# Patient Record
Sex: MALE | Race: WHITE | NOT HISPANIC OR LATINO | Employment: OTHER | URBAN - METROPOLITAN AREA
[De-identification: names, ages, dates, MRNs, and addresses within clinical notes are randomized per-mention and may not be internally consistent; named-entity substitution may affect disease eponyms.]

---

## 2017-01-04 ENCOUNTER — HOSPITAL ENCOUNTER (OUTPATIENT)
Dept: PHYSICAL THERAPY | Facility: CLINIC | Age: 52
Setting detail: THERAPIES SERIES
End: 2017-01-04
Attending: INTERNAL MEDICINE
Payer: COMMERCIAL

## 2017-01-04 DIAGNOSIS — R60.0 EDEMA OF BOTH LEGS: Primary | ICD-10-CM

## 2017-01-04 PROCEDURE — 97535 SELF CARE MNGMENT TRAINING: CPT | Mod: GP,ZF | Performed by: PHYSICAL THERAPIST

## 2017-01-04 PROCEDURE — 97161 PT EVAL LOW COMPLEX 20 MIN: CPT | Mod: GP,ZF | Performed by: PHYSICAL THERAPIST

## 2017-01-04 PROCEDURE — 97140 MANUAL THERAPY 1/> REGIONS: CPT | Mod: GP,ZF | Performed by: PHYSICAL THERAPIST

## 2017-01-04 PROCEDURE — 40000449 ZZHC STATISTIC PT VISIT, LYMPHEDEMA: Mod: ZF | Performed by: PHYSICAL THERAPIST

## 2017-01-04 PROCEDURE — 97110 THERAPEUTIC EXERCISES: CPT | Mod: GP,ZF | Performed by: PHYSICAL THERAPIST

## 2017-01-04 NOTE — PROGRESS NOTES
01/04/17 1500   Quick Adds   Quick Adds Certification   Rehab Discipline   Discipline PT   Type of Visit   Type of visit Initial Edema Evaluation   General Information   Start of care 01/04/17   Referring physician Dr. Juan Simms   Orders Evaluate and treat as indicated   Order date 11/30/16   Medical diagnosis S/p Liver Transplant, edema of both legs   Onset of illness / date of surgery 09/20/17  (Liver Transplant)   Edema onset 04/01/16   Affected body parts Trunk;LLE;RLE   Edema etiology Surgery  (Medical Edema from Transplant)   Edema etiology comments Pt completed course of edema therapy prior to transplant. Since transplant, patient did have some increased fluid that improved with Lasix but pt would like to have evaluation by Edema therapy.  Pt reports that swelling on flights of over than 4 hours.   Surgical / medical history reviewed Yes   Prior level of functional mobility Pt reports feeling that mobility can still be difficult   Prior treatment Complete decongestive therapy   Community support Family / friend caregiver   Patient role / employment history Disabled   Psychosocial concerns Impaired body image   Living environment Apartment / condo   Current assistive devices (None)   Fall Screening   Fall screen completed by PT   Per patient, fall 2 or more times in past year? No   Per patient, fall with injury in past year? No   Is patient a fall risk? No   System Outcome Measures   Lymphedema Life Impact Scale (score range 0-72). A higher score indicates greater impairment. 24   Functional Scales   Lower Extremity Functional Scale (score out of 80). A lower score indicates greater impairment). 33   Subjective Report   Patient report of symptoms My legs are sensitive and heavy. I still feel weak.   Patient / Family Goals   Patient / family goals statement I would just like to know what to do now to manage the swelling.   Pain   Patient currently in pain No   Cognitive Status   Orientation Orientation to  "person, place and time   Level of consciousness Alert   Follows commands and answers questions 100% of the time   Personal safety and judgement Intact   Memory Intact   Edema Exam / Assessment   Skin condition Pitting;Dryness   Skin condition comments Skin intact, no scratching marks, mild petichaie   Pitting 3+   Pitting location B pre-tibial  (No foot pitting)   Stemmer sign Negative   Girth Measurements   Girth Measurements Refer to separate girth measurement flowsheet   Volume LE   Right LE (mL) 7572   Left LE (mL) 7708   LE volume comparison LLE volume greater than RLE volume   % difference 2   Range of Motion   ROM comments Decreased knee and hip flexion   Strength   Strength Other   Strength comments \"I feel weak.\" Not formally tested. Pt is able to complete SLR and walk independently, he reports finishing with home PT about 2 weeks ago.   Posture   Posture Normal   Activities of Daily Living   Activities of Daily Living Independent   Bed Mobility   Bed mobility Independent   Transfers   Transfers Independent   Gait / Locomotion   Gait / Locomotion Independent  (I still get winded)   Sensory   Sensory perception comments Intact   Coordination   Coordination Gross motor coordination appropriate   Muscle Tone   Muscle tone No deficits were identified   Planned Edema Interventions   Planned edema interventions Manual lymph drainage;Fit for compression garment;Gradient compression bandaging;Exercises;Precautions to prevent infection / exacerbation;Education;Home management program development   Clinical Impression   Criteria for skilled therapeutic intervention met Yes   Therapy diagnosis Lymphedema   Influenced by the following impairments / conditions Stage 2   Functional limitations due to impairments / conditions stairs, squating, walking tolerance,fit of clothing, ability to perform leisure activity, sleep   Clinical Presentation Stable/Uncomplicated   Clinical Presentation Rationale Pt is post-transplant, " doing well but needs more time to recover   Clinical Decision Making (Complexity) Low complexity   Treatment frequency Other;1 time / week  (4 weeks)   Treatment duration 60 days   Patient / family and/or staff in agreement with plan of care Yes   Risks and benefits of therapy have been explained Yes   Clinical impression comments Pt reports to clinic post-liver transplant to review edema management strategies and progress home program   Education Assessment   Preferred learning style Listening;Reading;Demonstration;Pictures / video   Barriers to learning Physical   Goals   Edema Eval Goals 1;2   Goal 1   Goal identifier Home Program   Goal description Pt will be independent with his home program to manage LE swelling.   Target date 03/04/17   Goal 2   Goal identifier Volume   Goal description Pt will have decreased volume of 5% in each LE for improved mobility and fit of shoes.   Target date 03/04/17   Goal 3   Goal identifier LLIS   Goal description Pt will score decrease of 7 or more points reflecting decreased impact of LE swelling on quality of life.   Target date 03/04/17   Total Evaluation Time   Total evaluation time 15   Certification   Certification date from 01/04/17   Certification date to 03/04/17   Medical Diagnosis Lymphedema

## 2017-01-05 NOTE — PROGRESS NOTES
[facility]        OUTPATIENT PHYSICAL THERAPY EDEMA EVALUATION  PLAN OF TREATMENT FOR OUTPATIENT REHABILITATION  (COMPLETE FOR INITIAL CLAIMS ONLY)  Patient's Last Name, First Name, DANISLEO  DylonBlanco CENTENO                           Provider s Name:   [facility] Medical Record No.  [id number]     Start of Care Date:  01/04/17   Onset Date:  04/01/16   Type:  PT   Medical Diagnosis:  Lymphedema   Therapy Diagnosis:  Lymphedema Visits from SOC:  1                                     __________________________________________________________________________________   Plan of Treatment/Functional Goals:    Manual lymph drainage, Fit for compression garment, Gradient compression bandaging, Exercises, Precautions to prevent infection / exacerbation, Education, Home management program development        GOALS  1. Goal description: Pt will be independent with his home program to manage LE swelling.       Target date: 03/04/17  2. Goal description: Pt will have decreased volume of 5% in each LE for improved mobility and fit of shoes.       Target date: 03/04/17  3. Goal description: Pt will score decrease of 7 or more points reflecting decreased impact of LE swelling on quality of life.       Target date: 03/04/17  4.            5.            6.               7.             8.              Treatment frequency: Other, 1 time / week (4 weeks)   Treatment duration: 60 days    Aracely Macias, PT                                  I CERTIFY THE NEED FOR THESE SERVICES FURNISHED UNDER THIS PLAN OF TREATMENT AND WHILE UNDER MY CARE     (Physician signature in associated progress note indicates review and certification of the therapy plan)                  Certification date from: 01/04/17       Certification date to: 03/04/17           Referring physician: Dr. Juan Simms    Initial Assessment  See Epic Evaluation- Start of care: 01/04/17

## 2017-01-13 ENCOUNTER — TELEPHONE (OUTPATIENT)
Dept: TRANSPLANT | Facility: CLINIC | Age: 52
End: 2017-01-13

## 2017-01-13 ENCOUNTER — TELEPHONE (OUTPATIENT)
Dept: PHARMACY | Facility: CLINIC | Age: 52
End: 2017-01-13

## 2017-01-13 NOTE — TELEPHONE ENCOUNTER
Jerrica Almaraz, RN Signed Jerrica Almaraz, RN 12/14/2016  9:56 AM       Telephone Encounter      Expand All Collapse All    Spoke to Blanco.  Is feeling well, just returned from [place].  WBC low, due per protocol to wean off next week.  For now asked him to decrease cellcept to 500 mg po bid.

## 2017-01-13 NOTE — TELEPHONE ENCOUNTER
----- Message from Blanco Garza [facility] sent at 1/13/2017 10:34 AM CST -----  Oumou Simeon said is mycophenolate is now just 1 capsule a day.  Can you verify and send us a new RX?  Thanks  Ramakrishna

## 2017-01-17 ENCOUNTER — TELEPHONE (OUTPATIENT)
Dept: TRANSPLANT | Facility: CLINIC | Age: 52
End: 2017-01-17

## 2017-01-17 DIAGNOSIS — D84.9 IMMUNOSUPPRESSED STATUS (H): ICD-10-CM

## 2017-01-17 DIAGNOSIS — Z94.4 LIVER TRANSPLANTED (H): ICD-10-CM

## 2017-01-17 DIAGNOSIS — D70.9 NEUTROPENIA (H): Primary | ICD-10-CM

## 2017-01-17 NOTE — TELEPHONE ENCOUNTER
Received labs today showing wbc 1.8 on 1/10.  Pt was CMV neg, donor CMV pos.  On lower dose cellcept and valcyte.  Told Blanco that I'm hesitant to change meds based on labs that are a week old.  Blanco will come here tomorrow for all post labs and CMV DNA PCR.  Orders in system.

## 2017-01-19 ENCOUNTER — DOCUMENTATION ONLY (OUTPATIENT)
Dept: TRANSPLANT | Facility: CLINIC | Age: 52
End: 2017-01-19

## 2017-01-19 NOTE — PROGRESS NOTES
Per post liver transplant protocol, this patient should be having ETG level every 3 mos.  Please send order to his lab and also place in the [employer] system.

## 2017-01-23 ENCOUNTER — OFFICE VISIT (OUTPATIENT)
Dept: GASTROENTEROLOGY | Facility: CLINIC | Age: 52
End: 2017-01-23
Attending: INTERNAL MEDICINE
Payer: COMMERCIAL

## 2017-01-23 ENCOUNTER — HOSPITAL ENCOUNTER (OUTPATIENT)
Dept: PHYSICAL THERAPY | Facility: CLINIC | Age: 52
Setting detail: THERAPIES SERIES
End: 2017-01-23
Attending: INTERNAL MEDICINE
Payer: COMMERCIAL

## 2017-01-23 VITALS
HEART RATE: 56 BPM | WEIGHT: 258.7 LBS | HEIGHT: 72 IN | SYSTOLIC BLOOD PRESSURE: 152 MMHG | BODY MASS INDEX: 35.04 KG/M2 | DIASTOLIC BLOOD PRESSURE: 96 MMHG | TEMPERATURE: 98.8 F

## 2017-01-23 DIAGNOSIS — I10 BENIGN ESSENTIAL HYPERTENSION: Primary | ICD-10-CM

## 2017-01-23 DIAGNOSIS — Z94.4 LIVER REPLACED BY TRANSPLANT (H): Primary | ICD-10-CM

## 2017-01-23 PROCEDURE — 97140 MANUAL THERAPY 1/> REGIONS: CPT | Mod: GP,ZF | Performed by: PHYSICAL THERAPIST

## 2017-01-23 PROCEDURE — 40000449 ZZHC STATISTIC PT VISIT, LYMPHEDEMA: Mod: ZF | Performed by: PHYSICAL THERAPIST

## 2017-01-23 PROCEDURE — 99212 OFFICE O/P EST SF 10 MIN: CPT | Mod: ZF

## 2017-01-23 PROCEDURE — 97535 SELF CARE MNGMENT TRAINING: CPT | Mod: GP,ZF | Performed by: PHYSICAL THERAPIST

## 2017-01-23 RX ORDER — AMLODIPINE BESYLATE 5 MG/1
5 TABLET ORAL DAILY
Qty: 90 TABLET | Refills: 3 | Status: SHIPPED | OUTPATIENT
Start: 2017-01-23 | End: 2018-01-23

## 2017-01-23 ASSESSMENT — PAIN SCALES - GENERAL: PAINLEVEL: MILD PAIN (3)

## 2017-01-23 NOTE — NURSING NOTE
Chief Complaint   Patient presents with     RECHECK     3 month follow up       Initial /96 mmHg  Pulse 56  Temp(Src) 98.8  F (37.1  C) (Oral)  Ht 1.829 m (6')  Wt 117.346 kg (258 lb 11.2 oz)  BMI 35.08 kg/m2 Estimated body mass index is 35.08 kg/(m^2) as calculated from the following:    Height as of this encounter: 1.829 m (6').    Weight as of this encounter: 117.346 kg (258 lb 11.2 oz).  BP completed using cuff size: regular

## 2017-01-23 NOTE — PROGRESS NOTES
I had the pleasure of seeing Blanco Osborne for followup in the Liver Transplantation Clinic at the [facility] on 01/23/2017.  Mr. Osborne returns for followup now 4 months status post liver transplantation.        He is doing well at this visit.  He does still have some abdominal discomfort that is josé luis-incisional.  He denies any itching or skin rash.  He still has some fatigue.  He denies any increased abdominal girth or lower extremity edema.  He denies any fevers or chills, cough or shortness of breath.  He denies any nausea or vomiting, diarrhea or constipation.  His appetite has been good, and he is trying to keep his weight under control given that he has nonalcoholic fatty liver disease as the likely cause of his cirrhosis.  He is in the process of weaning off CellCept.      He does report that his blood pressure has been running consistently elevated at home.     Current Outpatient Prescriptions   Medication     amLODIPine (NORVASC) 5 MG tablet     PROGRAF 1 MG PO CAPSULE     aspirin 81 MG tablet     mycophenolate (CELLCEPT - GENERIC EQUIVALENT) 250 MG capsule     valGANciclovir (VALCYTE) 450 MG tablet     sulfamethoxazole-trimethoprim (BACTRIM,SEPTRA) 400-80 MG per tablet     order for DME     multivitamin, therapeutic with minerals (THERA-VIT-M) TABS     No current facility-administered medications for this visit.     B/P: 152/96, T: 98.8, P: 56, R: Data Unavailable    HEENT exam shows no scleral icterus and no temporal muscle wasting.  Chest is clear.  Abdominal exam shows no increase in girth.  No masses or tenderness to palpation are present.  His liver is 10 cm in span without left lobe enlargement.  No spleen tip is palpable.  His incision appears intact.  His extremity exam shows no edema.  Skin exam shows some easy bruisability, but otherwise it is within normal limits.     His most recent laboratory tests show his white count is 1.8, hemoglobin is 11.2.  Platelets are  102,000.  Sodium 141, potassium 4.5, BUN is 22, creatinine 1.2, AST is 38, ALT is 31, alkaline phosphatase is 111, albumin is 3.6 with a total protein of 6.1, total bilirubin is 0.7.        My impression is that Mr. Osborne is doing very well status post liver transplantation.  He is a bit tremulous and does complain of some neuropathic type pain, and if he is still having this over the next 3 months, we may consider taking him off tacrolimus and switching him to cyclosporine.  In the meantime, he will discontinue his CellCept in 1 week.  He will get blood work done a week after that.  He is otherwise up-to-date with regard to vaccines.        Because of his persistently elevated blood pressure, I will put him on Norvasc 5 mg per day and treat his hypertension.  Otherwise, the plan will be to see him back in the clinic in 3 months.      Thank you very much for allowing me to participate in the care this patient.  If you have any questions regarding my recommendations, please do not hesitate to contact me.       Juan Simms MD    Professor of Medicine  [employer]    Executive Medical Director, Solid Organ Transplant Program  [facility]

## 2017-01-23 NOTE — NURSING NOTE
Here for post liver transplant follow-up.   Reviewed recent labs and assisted with interpretation.  Is recording labs in post transplant lab book.  WBC low - will send order to local lab asking for CMV DNA PCR.    Current immunosuppression:    cellcept / prograf.  Weaning cellcept off.      Complaints:  Tremor.    Med changes: none    Lab frequency:  q 2 weeks due to cellcept wean    Follow-up:  Hepatology, derm and PCP annually.

## 2017-01-23 NOTE — Clinical Note
1/23/2017       RE: Blanco Osborne  [address] [place] [address] [place], [place] [id number]     Dear Colleague,    Thank you for referring your patient, Blanco Osborne, to the [facility] at [facility]. Please see a copy of my visit note below.    I had the pleasure of seeing Blanco Osborne for followup in the Liver Transplantation Clinic at the [facility] on 01/23/2017.  Mr. Osborne returns for followup now 4 months status post liver transplantation.        He is doing well at this visit.  He does still have some abdominal discomfort that is josé luis-incisional.  He denies any itching or skin rash.  He still has some fatigue.  He denies any increased abdominal girth or lower extremity edema.  He denies any fevers or chills, cough or shortness of breath.  He denies any nausea or vomiting, diarrhea or constipation.  His appetite has been good, and he is trying to keep his weight under control given that he has nonalcoholic fatty liver disease as the likely cause of his cirrhosis.  He is in the process of weaning off CellCept.      He does report that his blood pressure has been running consistently elevated at home.     Current Outpatient Prescriptions   Medication     amLODIPine (NORVASC) 5 MG tablet     PROGRAF 1 MG PO CAPSULE     aspirin 81 MG tablet     mycophenolate (CELLCEPT - GENERIC EQUIVALENT) 250 MG capsule     valGANciclovir (VALCYTE) 450 MG tablet     sulfamethoxazole-trimethoprim (BACTRIM,SEPTRA) 400-80 MG per tablet     order for DME     multivitamin, therapeutic with minerals (THERA-VIT-M) TABS     No current facility-administered medications for this visit.     B/P: 152/96, T: 98.8, P: 56, R: Data Unavailable    HEENT exam shows no scleral icterus and no temporal muscle wasting.  Chest is clear.  Abdominal exam shows no increase in girth.  No masses or tenderness to palpation are present.  His liver is 10 cm in span without left lobe  enlargement.  No spleen tip is palpable.  His incision appears intact.  His extremity exam shows no edema.  Skin exam shows some easy bruisability, but otherwise it is within normal limits.     His most recent laboratory tests show his white count is 1.8, hemoglobin is 11.2.  Platelets are 102,000.  Sodium 141, potassium 4.5, BUN is 22, creatinine 1.2, AST is 38, ALT is 31, alkaline phosphatase is 111, albumin is 3.6 with a total protein of 6.1, total bilirubin is 0.7.        My impression is that Mr. Osborne is doing very well status post liver transplantation.  He is a bit tremulous and does complain of some neuropathic type pain, and if he is still having this over the next 3 months, we may consider taking him off tacrolimus and switching him to cyclosporine.  In the meantime, he will discontinue his CellCept in 1 week.  He will get blood work done a week after that.  He is otherwise up-to-date with regard to vaccines.        Because of his persistently elevated blood pressure, I will put him on Norvasc 5 mg per day and treat his hypertension.  Otherwise, the plan will be to see him back in the clinic in 3 months.      Thank you very much for allowing me to participate in the care this patient.  If you have any questions regarding my recommendations, please do not hesitate to contact me.       Juan Simms MD    Professor of Medicine  [employer]    Executive Medical Director, Solid Organ Transplant Program  [facility]

## 2017-01-24 NOTE — NURSING NOTE
Here for post liver transplant follow-up.     Current immunosuppression:    Weaning cellcept.  Will stay on prograf, but has a tremor.  Will return in 3 mos at which time we will re-evalauate immunosuppression.    Complaints:  Tremor.  Not feeling as strong as he'd like though he is doing more than most patients at this time.  Encouraged him to be patient w/ progress, work on strengthening    Med changes: Dr. Simms started norvasc 5 mg qd..  Updated patient's med card.    Lab frequency:  q 2 wks for 1 mo post cellcept wean.    Follow-up:  Hepatology, derm and PCP annually.

## 2017-01-27 ENCOUNTER — HOSPITAL ENCOUNTER (OUTPATIENT)
Dept: PHYSICAL THERAPY | Facility: CLINIC | Age: 52
Setting detail: THERAPIES SERIES
End: 2017-01-27
Attending: INTERNAL MEDICINE
Payer: COMMERCIAL

## 2017-01-27 DIAGNOSIS — I89.0 LYMPHEDEMA OF BOTH LOWER EXTREMITIES: Primary | ICD-10-CM

## 2017-01-27 PROCEDURE — 40000449 ZZHC STATISTIC PT VISIT, LYMPHEDEMA: Mod: ZF | Performed by: PHYSICAL THERAPIST

## 2017-01-27 PROCEDURE — 97140 MANUAL THERAPY 1/> REGIONS: CPT | Mod: GP,ZF | Performed by: PHYSICAL THERAPIST

## 2017-01-27 PROCEDURE — 97535 SELF CARE MNGMENT TRAINING: CPT | Mod: GP,ZF | Performed by: PHYSICAL THERAPIST

## 2017-02-01 ENCOUNTER — HOSPITAL ENCOUNTER (OUTPATIENT)
Dept: PHYSICAL THERAPY | Facility: CLINIC | Age: 52
Setting detail: THERAPIES SERIES
End: 2017-02-01
Attending: INTERNAL MEDICINE
Payer: COMMERCIAL

## 2017-02-01 PROCEDURE — 40000449 ZZHC STATISTIC PT VISIT, LYMPHEDEMA: Mod: ZF | Performed by: PHYSICAL THERAPIST

## 2017-02-01 PROCEDURE — 97140 MANUAL THERAPY 1/> REGIONS: CPT | Mod: GP,ZF | Performed by: PHYSICAL THERAPIST

## 2017-02-01 PROCEDURE — 97535 SELF CARE MNGMENT TRAINING: CPT | Mod: GP,ZF | Performed by: PHYSICAL THERAPIST

## 2017-02-03 ENCOUNTER — HOSPITAL ENCOUNTER (OUTPATIENT)
Dept: PHYSICAL THERAPY | Facility: CLINIC | Age: 52
Setting detail: THERAPIES SERIES
End: 2017-02-03
Attending: INTERNAL MEDICINE
Payer: COMMERCIAL

## 2017-02-03 DIAGNOSIS — Z13.220 LIPID SCREENING: ICD-10-CM

## 2017-02-03 DIAGNOSIS — D84.9 IMMUNOSUPPRESSED STATUS (H): ICD-10-CM

## 2017-02-03 DIAGNOSIS — D70.9 NEUTROPENIA (H): ICD-10-CM

## 2017-02-03 DIAGNOSIS — Z94.4 LIVER TRANSPLANTED (H): ICD-10-CM

## 2017-02-03 DIAGNOSIS — Z94.4 LIVER REPLACED BY TRANSPLANT (H): ICD-10-CM

## 2017-02-03 LAB
ALBUMIN SERPL-MCNC: 3.8 G/DL (ref 3.4–5)
ALP SERPL-CCNC: 127 U/L (ref 40–150)
ALT SERPL W P-5'-P-CCNC: 40 U/L (ref 0–70)
ANION GAP SERPL CALCULATED.3IONS-SCNC: 7 MMOL/L (ref 3–14)
AST SERPL W P-5'-P-CCNC: 44 U/L (ref 0–45)
BILIRUB DIRECT SERPL-MCNC: 0.2 MG/DL (ref 0–0.2)
BILIRUB SERPL-MCNC: 0.5 MG/DL (ref 0.2–1.3)
BUN SERPL-MCNC: 21 MG/DL (ref 7–30)
CALCIUM SERPL-MCNC: 8.8 MG/DL (ref 8.5–10.1)
CHLORIDE SERPL-SCNC: 105 MMOL/L (ref 94–109)
CO2 SERPL-SCNC: 30 MMOL/L (ref 20–32)
CREAT SERPL-MCNC: 1.04 MG/DL (ref 0.66–1.25)
ERYTHROCYTE [DISTWIDTH] IN BLOOD BY AUTOMATED COUNT: 15 % (ref 10–15)
GFR SERPL CREATININE-BSD FRML MDRD: 75 ML/MIN/1.7M2
GLUCOSE SERPL-MCNC: 113 MG/DL (ref 70–99)
HCT VFR BLD AUTO: 37.5 % (ref 40–53)
HGB BLD-MCNC: 12.9 G/DL (ref 13.3–17.7)
INR PPP: 1.04 (ref 0.86–1.14)
MAGNESIUM SERPL-MCNC: 2.1 MG/DL (ref 1.6–2.3)
MCH RBC QN AUTO: 35.1 PG (ref 26.5–33)
MCHC RBC AUTO-ENTMCNC: 34.4 G/DL (ref 31.5–36.5)
MCV RBC AUTO: 102 FL (ref 78–100)
PHOSPHATE SERPL-MCNC: 4 MG/DL (ref 2.5–4.5)
PLATELET # BLD AUTO: 128 10E9/L (ref 150–450)
POTASSIUM SERPL-SCNC: 4.5 MMOL/L (ref 3.4–5.3)
PROT SERPL-MCNC: 7.1 G/DL (ref 6.8–8.8)
RBC # BLD AUTO: 3.67 10E12/L (ref 4.4–5.9)
SODIUM SERPL-SCNC: 142 MMOL/L (ref 133–144)
TACROLIMUS BLD-MCNC: 4.7 UG/L (ref 5–15)
TME LAST DOSE: ABNORMAL H
WBC # BLD AUTO: 5 10E9/L (ref 4–11)

## 2017-02-03 PROCEDURE — 80197 ASSAY OF TACROLIMUS: CPT | Performed by: INTERNAL MEDICINE

## 2017-02-03 PROCEDURE — 84100 ASSAY OF PHOSPHORUS: CPT | Performed by: INTERNAL MEDICINE

## 2017-02-03 PROCEDURE — 40000449 ZZHC STATISTIC PT VISIT, LYMPHEDEMA: Mod: ZF | Performed by: PHYSICAL THERAPIST

## 2017-02-03 PROCEDURE — 97535 SELF CARE MNGMENT TRAINING: CPT | Mod: GP,ZF | Performed by: PHYSICAL THERAPIST

## 2017-02-03 PROCEDURE — 83735 ASSAY OF MAGNESIUM: CPT | Performed by: INTERNAL MEDICINE

## 2017-02-03 PROCEDURE — 80076 HEPATIC FUNCTION PANEL: CPT | Performed by: INTERNAL MEDICINE

## 2017-02-03 PROCEDURE — 85027 COMPLETE CBC AUTOMATED: CPT | Performed by: INTERNAL MEDICINE

## 2017-02-03 PROCEDURE — 97140 MANUAL THERAPY 1/> REGIONS: CPT | Mod: GP,ZF | Performed by: PHYSICAL THERAPIST

## 2017-02-03 PROCEDURE — 80048 BASIC METABOLIC PNL TOTAL CA: CPT | Performed by: INTERNAL MEDICINE

## 2017-02-03 PROCEDURE — 85610 PROTHROMBIN TIME: CPT | Performed by: INTERNAL MEDICINE

## 2017-02-03 NOTE — NURSING NOTE
Chief Complaint   Patient presents with     Blood Draw     Labs collected peripherally by RN.     Labs collected via venipuncture.   Janette Baron RN

## 2017-02-04 LAB
CMV DNA SPEC NAA+PROBE-ACNC: NORMAL [IU]/ML
CMV DNA SPEC NAA+PROBE-LOG#: NORMAL {LOG_IU}/ML
SPECIMEN SOURCE: NORMAL

## 2017-02-08 DIAGNOSIS — Z94.4 LIVER TRANSPLANTED (H): ICD-10-CM

## 2017-02-08 DIAGNOSIS — D84.9 IMMUNOSUPPRESSION (H): ICD-10-CM

## 2017-02-08 NOTE — TELEPHONE ENCOUNTER
Tacrolimus level 4.7.    Please instruct patient to increase Prograf dose to 4 mg in the morning and 4 mg in the evening.

## 2017-02-16 RX ORDER — TACROLIMUS 1 MG/1
4 CAPSULE, GELATIN COATED ORAL EVERY 12 HOURS
Qty: 720 CAPSULE | Refills: 3 | Status: SHIPPED | OUTPATIENT
Start: 2017-02-16 | End: 2017-05-24

## 2017-02-23 DIAGNOSIS — D84.9 IMMUNOSUPPRESSION (H): ICD-10-CM

## 2017-02-23 DIAGNOSIS — Z94.4 LIVER TRANSPLANTED (H): ICD-10-CM

## 2017-02-23 RX ORDER — VALGANCICLOVIR 450 MG/1
450 TABLET, FILM COATED ORAL DAILY
Qty: 28 TABLET | Refills: 0 | Status: SHIPPED | OUTPATIENT
Start: 2017-02-23 | End: 2017-03-23

## 2017-02-23 NOTE — TELEPHONE ENCOUNTER
Drug Name: valganciclovir 450mg  Last Fill Date: 2/6/17  Quantity: 30    Cinthia Brewer   [facility]  [phone]

## 2017-03-03 ENCOUNTER — HOSPITAL ENCOUNTER (OUTPATIENT)
Dept: PHYSICAL THERAPY | Facility: CLINIC | Age: 52
Setting detail: THERAPIES SERIES
End: 2017-03-03
Attending: INTERNAL MEDICINE
Payer: COMMERCIAL

## 2017-03-03 PROCEDURE — 97535 SELF CARE MNGMENT TRAINING: CPT | Mod: GP,ZF | Performed by: PHYSICAL THERAPIST

## 2017-03-03 PROCEDURE — 97140 MANUAL THERAPY 1/> REGIONS: CPT | Mod: GP,ZF | Performed by: PHYSICAL THERAPIST

## 2017-03-03 PROCEDURE — 40000449 ZZHC STATISTIC PT VISIT, LYMPHEDEMA: Mod: ZF | Performed by: PHYSICAL THERAPIST

## 2017-03-03 NOTE — PROGRESS NOTES
Outpatient Physical Therapy Discharge Note     Patient: Blanco Osborne  : 1965    Beginning/End Dates of Reporting Period:  17 to 3/3/2017    Referring Provider: Dr. Juan Simms    Therapy Diagnosis: Lymphedema B LE     Client Self Report: I don't have compression on right now, Ofe (sig other who is an RN and has been helping with wrapping) doesn't think I need it anymore.    Objective Measurements:  Objective Measure: Volume R LE  Details: 7515.14  Objective Measure: Volume L LE  Details: 7495.37  Objective Measure: Pitting  Details: 1+                        Outcome Measures (most recent score):  Lymphedema Life Impact Scale (score range 0-72). A higher score indicates greater impairment.: 17 (25% impairment)    Goals:  Goal Identifier Home Program   Goal Description Pt will be independent with his home program to manage LE swelling.   Target Date 17   Date Met  17   Progress:     Goal Identifier Volume   Goal Description Pt will have decreased volume of 5% in each LE for improved mobility and fit of shoes.   Target Date 17   Date Met   Not Met   Progress: R LE reduction 2.1%, L LE reduction 4.2%     Goal Identifier LLIS   Goal Description Pt will score decrease of 7 or more points reflecting decreased impact of LE swelling on quality of life.   Target Date 17   Date Met  17   Progress: 17 from 24     Progress Toward Goals:   Progress this reporting period: Pt met 2/3 goals.  Volume goals not met but edema much improved and volume increase attributed to increased muscle due to increased activity. Pt has not been using day garments but does use night garments and feels that his swelling is well controlled.    Plan:  Discharge from therapy.    Discharge:    Reason for Discharge: Patient has met all goals.    Discharge Plan: Patient to continue home program.

## 2017-03-08 ENCOUNTER — TELEPHONE (OUTPATIENT)
Dept: TRANSPLANT | Facility: CLINIC | Age: 52
End: 2017-03-08

## 2017-03-08 NOTE — TELEPHONE ENCOUNTER
Blanco is now >6 mos post transplant.  Left him a reminder that if he hasn't already stopped, he can / should stop bactrim.

## 2017-03-22 ENCOUNTER — TELEPHONE (OUTPATIENT)
Dept: PHARMACY | Facility: CLINIC | Age: 52
End: 2017-03-22

## 2017-03-23 ENCOUNTER — TELEPHONE (OUTPATIENT)
Dept: TRANSPLANT | Facility: CLINIC | Age: 52
End: 2017-03-23

## 2017-03-23 ENCOUNTER — TELEPHONE (OUTPATIENT)
Dept: PHARMACY | Facility: CLINIC | Age: 52
End: 2017-03-23

## 2017-03-23 DIAGNOSIS — D84.9 IMMUNOSUPPRESSION (H): ICD-10-CM

## 2017-03-23 DIAGNOSIS — Z94.4 LIVER TRANSPLANTED (H): Primary | ICD-10-CM

## 2017-03-23 RX ORDER — VALGANCICLOVIR 450 MG/1
450 TABLET, FILM COATED ORAL DAILY
Qty: 30 TABLET | Refills: 0 | OUTPATIENT
Start: 2017-03-23

## 2017-03-23 NOTE — TELEPHONE ENCOUNTER
Call to Blanco to remind him to stop valcyte.  He will do.  Blanco told me he has had labs twice since the end of February.  I have not received them.  Asked him to call his lab and ask that results be sent to us.

## 2017-03-23 NOTE — TELEPHONE ENCOUNTER
Drug: Valganciclovir  Last Fill Date: 3/1/2017  Quantity: 28    [facility]  Phone: [phone]

## 2017-04-13 ENCOUNTER — TELEPHONE (OUTPATIENT)
Dept: GASTROENTEROLOGY | Facility: CLINIC | Age: 52
End: 2017-04-13

## 2017-04-13 NOTE — TELEPHONE ENCOUNTER
Left message for pt reminding them of upcoming appointment.  Instructed pt to bring updated medications list.  Instructed pt to arrive an hour and a half to two hours prior to appt time for labs.  Benedict Weber, CMA

## 2017-04-19 ENCOUNTER — TELEPHONE (OUTPATIENT)
Dept: TRANSPLANT | Facility: CLINIC | Age: 52
End: 2017-04-19

## 2017-04-25 ENCOUNTER — OFFICE VISIT (OUTPATIENT)
Dept: GASTROENTEROLOGY | Facility: CLINIC | Age: 52
End: 2017-04-25
Attending: INTERNAL MEDICINE
Payer: COMMERCIAL

## 2017-04-25 VITALS
SYSTOLIC BLOOD PRESSURE: 169 MMHG | HEIGHT: 72 IN | HEART RATE: 75 BPM | TEMPERATURE: 98.6 F | OXYGEN SATURATION: 97 % | BODY MASS INDEX: 37.19 KG/M2 | DIASTOLIC BLOOD PRESSURE: 108 MMHG | WEIGHT: 274.6 LBS | RESPIRATION RATE: 20 BRPM

## 2017-04-25 DIAGNOSIS — D84.9 IMMUNOSUPPRESSED STATUS (H): ICD-10-CM

## 2017-04-25 DIAGNOSIS — Z94.4 LIVER REPLACED BY TRANSPLANT (H): Primary | ICD-10-CM

## 2017-04-25 DIAGNOSIS — Z94.4 LIVER TRANSPLANTED (H): ICD-10-CM

## 2017-04-25 DIAGNOSIS — D70.9 NEUTROPENIA (H): ICD-10-CM

## 2017-04-25 LAB
ALBUMIN SERPL-MCNC: 3.4 G/DL (ref 3.4–5)
ALP SERPL-CCNC: 114 U/L (ref 40–150)
ALT SERPL W P-5'-P-CCNC: 75 U/L (ref 0–70)
ANION GAP SERPL CALCULATED.3IONS-SCNC: 6 MMOL/L (ref 3–14)
AST SERPL W P-5'-P-CCNC: 68 U/L (ref 0–45)
BASOPHILS # BLD AUTO: 0.1 10E9/L (ref 0–0.2)
BASOPHILS NFR BLD AUTO: 1.6 %
BILIRUB DIRECT SERPL-MCNC: 0.3 MG/DL (ref 0–0.2)
BILIRUB SERPL-MCNC: 1.1 MG/DL (ref 0.2–1.3)
BUN SERPL-MCNC: 21 MG/DL (ref 7–30)
CALCIUM SERPL-MCNC: 9 MG/DL (ref 8.5–10.1)
CHLORIDE SERPL-SCNC: 107 MMOL/L (ref 94–109)
CO2 SERPL-SCNC: 26 MMOL/L (ref 20–32)
CREAT SERPL-MCNC: 1.01 MG/DL (ref 0.66–1.25)
DIFFERENTIAL METHOD BLD: ABNORMAL
EOSINOPHIL # BLD AUTO: 0.2 10E9/L (ref 0–0.7)
EOSINOPHIL NFR BLD AUTO: 4.1 %
ERYTHROCYTE [DISTWIDTH] IN BLOOD BY AUTOMATED COUNT: 15.5 % (ref 10–15)
GFR SERPL CREATININE-BSD FRML MDRD: 77 ML/MIN/1.7M2
GLUCOSE SERPL-MCNC: 105 MG/DL (ref 70–99)
HCT VFR BLD AUTO: 35.8 % (ref 40–53)
HGB BLD-MCNC: 12.1 G/DL (ref 13.3–17.7)
IMM GRANULOCYTES # BLD: 0 10E9/L (ref 0–0.4)
IMM GRANULOCYTES NFR BLD: 0.5 %
LYMPHOCYTES # BLD AUTO: 1.2 10E9/L (ref 0.8–5.3)
LYMPHOCYTES NFR BLD AUTO: 32 %
MCH RBC QN AUTO: 35.4 PG (ref 26.5–33)
MCHC RBC AUTO-ENTMCNC: 33.8 G/DL (ref 31.5–36.5)
MCV RBC AUTO: 105 FL (ref 78–100)
MONOCYTES # BLD AUTO: 0.4 10E9/L (ref 0–1.3)
MONOCYTES NFR BLD AUTO: 10.1 %
NEUTROPHILS # BLD AUTO: 2 10E9/L (ref 1.6–8.3)
NEUTROPHILS NFR BLD AUTO: 51.7 %
NRBC # BLD AUTO: 0 10*3/UL
NRBC BLD AUTO-RTO: 0 /100
PLATELET # BLD AUTO: 99 10E9/L (ref 150–450)
POTASSIUM SERPL-SCNC: 4.3 MMOL/L (ref 3.4–5.3)
PROT SERPL-MCNC: 6.5 G/DL (ref 6.8–8.8)
RBC # BLD AUTO: 3.42 10E12/L (ref 4.4–5.9)
SODIUM SERPL-SCNC: 140 MMOL/L (ref 133–144)
TACROLIMUS BLD-MCNC: 5.9 UG/L (ref 5–15)
TME LAST DOSE: NORMAL H
WBC # BLD AUTO: 3.9 10E9/L (ref 4–11)

## 2017-04-25 PROCEDURE — 80048 BASIC METABOLIC PNL TOTAL CA: CPT | Performed by: TRANSPLANT SURGERY

## 2017-04-25 PROCEDURE — 85025 COMPLETE CBC W/AUTO DIFF WBC: CPT | Performed by: TRANSPLANT SURGERY

## 2017-04-25 PROCEDURE — 36415 COLL VENOUS BLD VENIPUNCTURE: CPT | Performed by: TRANSPLANT SURGERY

## 2017-04-25 PROCEDURE — 80197 ASSAY OF TACROLIMUS: CPT | Performed by: TRANSPLANT SURGERY

## 2017-04-25 PROCEDURE — 80076 HEPATIC FUNCTION PANEL: CPT | Performed by: TRANSPLANT SURGERY

## 2017-04-25 PROCEDURE — 99213 OFFICE O/P EST LOW 20 MIN: CPT | Mod: ZF

## 2017-04-25 RX ORDER — ACETAMINOPHEN 500 MG
1000 TABLET ORAL EVERY 6 HOURS PRN
Status: ON HOLD | COMMUNITY
End: 2022-12-15

## 2017-04-25 RX ORDER — VIT C/E/ZN/COPPR/LUTEIN/ZEAXAN 60 MG-6 MG
1 CAPSULE ORAL DAILY
Status: ON HOLD | COMMUNITY
End: 2022-11-12

## 2017-04-25 ASSESSMENT — PAIN SCALES - GENERAL: PAINLEVEL: MILD PAIN (2)

## 2017-04-25 NOTE — PROGRESS NOTES
I had the pleasure of seeing Blanco Osborne for followup in the Liver Transplantation Clinic at the [facility] on 04/25/2017.  Mr. 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      He is doing well at this time.  He denies any abdominal pain.  He does complain of some numbness around his incision.  He denies any itching, skin rash or fatigue.  He denies any increased abdominal girth or lower extremity edema.  He denies any fevers or chills, cough or shortness of breath.  He denies any nausea or vomiting, diarrhea or constipation.  His appetite has been good, and his weight has been stable.  He does need to lose some weight.  There have been no other new events since he was last seen.       Current Outpatient Prescriptions   Medication     acetaminophen (TYLENOL) 500 MG tablet     multivitamin  with lutein (OCUVITE WITH LTEIN) CAPS per capsule     PROGRAF 1 MG PO CAPSULE     amLODIPine (NORVASC) 5 MG tablet     aspirin 81 MG tablet     order for DME     multivitamin, therapeutic with minerals (THERA-VIT-M) TABS     Cholecalciferol (VITAMIN D3) 5000 UNITS TBDP     order for DME     No current facility-administered medications for this visit.      B/P: 169/108[provider notified, pt took BP meds few min ago[, T: 98.6, P: 75, R: 20    HEENT exam shows no scleral icterus and no temporal muscle wasting.  Chest is clear.  Abdominal exam shows no increase in girth.  No masses or tenderness to palpation are present.  His liver is 10 cm in span without left lobe enlargement.  No spleen tip is palpable, and extremity exam shows no edema.  Skin exam shows no stigmata of chronic liver disease.  Neurologic exam is nonfocal.       Recent Results (from the past 168 hour(s))   Hepatic panel    Collection Time: 04/25/17 10:34 AM   Result Value Ref Range    Bilirubin Direct 0.3 (H) 0.0 - 0.2 mg/dL    Bilirubin Total 1.1  0.2 - 1.3 mg/dL    Albumin 3.4 3.4 - 5.0 g/dL    Protein Total 6.5 (L) 6.8 - 8.8 g/dL    Alkaline Phosphatase 114 40 - 150 U/L    ALT 75 (H) 0 - 70 U/L    AST 68 (H) 0 - 45 U/L   CBC with platelets differential    Collection Time: 04/25/17 10:34 AM   Result Value Ref Range    WBC 3.9 (L) 4.0 - 11.0 10e9/L    RBC Count 3.42 (L) 4.4 - 5.9 10e12/L    Hemoglobin 12.1 (L) 13.3 - 17.7 g/dL    Hematocrit 35.8 (L) 40.0 - 53.0 %     (H) 78 - 100 fl    MCH 35.4 (H) 26.5 - 33.0 pg    MCHC 33.8 31.5 - 36.5 g/dL    RDW 15.5 (H) 10.0 - 15.0 %    Platelet Count 99 (L) 150 - 450 10e9/L    Diff Method Automated Method     % Neutrophils 51.7 %    % Lymphocytes 32.0 %    % Monocytes 10.1 %    % Eosinophils 4.1 %    % Basophils 1.6 %    % Immature Granulocytes 0.5 %    Nucleated RBCs 0 0 /100    Absolute Neutrophil 2.0 1.6 - 8.3 10e9/L    Absolute Lymphocytes 1.2 0.8 - 5.3 10e9/L    Absolute Monocytes 0.4 0.0 - 1.3 10e9/L    Absolute Eosinophils 0.2 0.0 - 0.7 10e9/L    Absolute Basophils 0.1 0.0 - 0.2 10e9/L    Abs Immature Granulocytes 0.0 0 - 0.4 10e9/L    Absolute Nucleated RBC 0.0    Basic metabolic panel    Collection Time: 04/25/17 10:34 AM   Result Value Ref Range    Sodium 140 133 - 144 mmol/L    Potassium 4.3 3.4 - 5.3 mmol/L    Chloride 107 94 - 109 mmol/L    Carbon Dioxide 26 20 - 32 mmol/L    Anion Gap 6 3 - 14 mmol/L    Glucose 105 (H) 70 - 99 mg/dL    Urea Nitrogen 21 7 - 30 mg/dL    Creatinine 1.01 0.66 - 1.25 mg/dL    GFR Estimate 77 >60 mL/min/1.7m2    GFR Estimate If Black >90   GFR Calc   >60 mL/min/1.7m2    Calcium 9.0 8.5 - 10.1 mg/dL      My impression is that Mr. Osborne is doing well now 7 months status post liver transplantation.  I have encouraged him to work on his exercise and diet.  He really does need to lose about 20 pounds in weight.  He did not take his antihypertensive this morning, but he does report that his blood pressure has been under good control at home.  He has been  checking his blood sugars intermittently and has not had any high blood sugars.  I, otherwise, will not be making any change to his medical regimen.  He is on a fairly minimal amount of medication at this point in time.      Thank you very much for allowing me to participate in the care of this patient.  If you have any questions regarding my recommendations, please do not hesitate to contact me.       Juan Simms MD      Professor of Medicine  [employer]      Executive Medical Director, Solid Organ Transplant Program  [facility]

## 2017-04-25 NOTE — NURSING NOTE
"Chief Complaint   Patient presents with     RECHECK     S/P Liver Transplant 9/20/2016       Initial BP (!) 169/108 (BP Location: Right arm, Patient Position: Chair, Cuff Size: Adult Large)  Pulse 75  Temp 98.6  F (37  C) (Oral)  Resp 20  Ht 1.829 m (6' 0.01\")  Wt 124.6 kg (274 lb 9.6 oz)  SpO2 97%  BMI 37.23 kg/m2 Estimated body mass index is 37.23 kg/(m^2) as calculated from the following:    Height as of this encounter: 1.829 m (6' 0.01\").    Weight as of this encounter: 124.6 kg (274 lb 9.6 oz).  Medication Reconciliation: complete    "

## 2017-04-25 NOTE — LETTER
4/25/2017      RE: Blanco Osborne  [address]  [facility] [address]       I had the pleasure of seeing Blanco Osborne for followup in the Liver Transplantation Clinic at the [facility] on 04/25/2017.  Mr. 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      He is doing well at this time.  He denies any abdominal pain.  He does complain of some numbness around his incision.  He denies any itching, skin rash or fatigue.  He denies any increased abdominal girth or lower extremity edema.  He denies any fevers or chills, cough or shortness of breath.  He denies any nausea or vomiting, diarrhea or constipation.  His appetite has been good, and his weight has been stable.  He does need to lose some weight.  There have been no other new events since he was last seen.       Current Outpatient Prescriptions   Medication     acetaminophen (TYLENOL) 500 MG tablet     multivitamin  with lutein (OCUVITE WITH LTEIN) CAPS per capsule     PROGRAF 1 MG PO CAPSULE     amLODIPine (NORVASC) 5 MG tablet     aspirin 81 MG tablet     order for DME     multivitamin, therapeutic with minerals (THERA-VIT-M) TABS     Cholecalciferol (VITAMIN D3) 5000 UNITS TBDP     order for DME     No current facility-administered medications for this visit.      B/P: 169/108[provider notified, pt took BP meds few min ago[, T: 98.6, P: 75, R: 20    HEENT exam shows no scleral icterus and no temporal muscle wasting.  Chest is clear.  Abdominal exam shows no increase in girth.  No masses or tenderness to palpation are present.  His liver is 10 cm in span without left lobe enlargement.  No spleen tip is palpable, and extremity exam shows no edema.  Skin exam shows no stigmata of chronic liver disease.  Neurologic exam is nonfocal.       Recent Results (from the past 168 hour(s))   Hepatic panel    Collection Time: 04/25/17 10:34  AM   Result Value Ref Range    Bilirubin Direct 0.3 (H) 0.0 - 0.2 mg/dL    Bilirubin Total 1.1 0.2 - 1.3 mg/dL    Albumin 3.4 3.4 - 5.0 g/dL    Protein Total 6.5 (L) 6.8 - 8.8 g/dL    Alkaline Phosphatase 114 40 - 150 U/L    ALT 75 (H) 0 - 70 U/L    AST 68 (H) 0 - 45 U/L   CBC with platelets differential    Collection Time: 04/25/17 10:34 AM   Result Value Ref Range    WBC 3.9 (L) 4.0 - 11.0 10e9/L    RBC Count 3.42 (L) 4.4 - 5.9 10e12/L    Hemoglobin 12.1 (L) 13.3 - 17.7 g/dL    Hematocrit 35.8 (L) 40.0 - 53.0 %     (H) 78 - 100 fl    MCH 35.4 (H) 26.5 - 33.0 pg    MCHC 33.8 31.5 - 36.5 g/dL    RDW 15.5 (H) 10.0 - 15.0 %    Platelet Count 99 (L) 150 - 450 10e9/L    Diff Method Automated Method     % Neutrophils 51.7 %    % Lymphocytes 32.0 %    % Monocytes 10.1 %    % Eosinophils 4.1 %    % Basophils 1.6 %    % Immature Granulocytes 0.5 %    Nucleated RBCs 0 0 /100    Absolute Neutrophil 2.0 1.6 - 8.3 10e9/L    Absolute Lymphocytes 1.2 0.8 - 5.3 10e9/L    Absolute Monocytes 0.4 0.0 - 1.3 10e9/L    Absolute Eosinophils 0.2 0.0 - 0.7 10e9/L    Absolute Basophils 0.1 0.0 - 0.2 10e9/L    Abs Immature Granulocytes 0.0 0 - 0.4 10e9/L    Absolute Nucleated RBC 0.0    Basic metabolic panel    Collection Time: 04/25/17 10:34 AM   Result Value Ref Range    Sodium 140 133 - 144 mmol/L    Potassium 4.3 3.4 - 5.3 mmol/L    Chloride 107 94 - 109 mmol/L    Carbon Dioxide 26 20 - 32 mmol/L    Anion Gap 6 3 - 14 mmol/L    Glucose 105 (H) 70 - 99 mg/dL    Urea Nitrogen 21 7 - 30 mg/dL    Creatinine 1.01 0.66 - 1.25 mg/dL    GFR Estimate 77 >60 mL/min/1.7m2    GFR Estimate If Black >90   GFR Calc   >60 mL/min/1.7m2    Calcium 9.0 8.5 - 10.1 mg/dL      My impression is that Mr. Osborne is doing well now 7 months status post liver transplantation.  I have encouraged him to work on his exercise and diet.  He really does need to lose about 20 pounds in weight.  He did not take his antihypertensive this morning,  but he does report that his blood pressure has been under good control at home.  He has been checking his blood sugars intermittently and has not had any high blood sugars.  I, otherwise, will not be making any change to his medical regimen.  He is on a fairly minimal amount of medication at this point in time.      Thank you very much for allowing me to participate in the care of this patient.  If you have any questions regarding my recommendations, please do not hesitate to contact me.       Juan Simms MD      Professor of Medicine  [employer]      Executive Medical Director, Solid Organ Transplant Program  [facility]

## 2017-04-25 NOTE — MR AVS SNAPSHOT
After Visit Summary   4/25/2017    Blanco Osborne    MRN: [id number]           Patient Information     Date Of Birth          1965        Visit Information        Provider Department      4/25/2017 11:15 AM Juan Simms MD [facility] Hepatology        Today's Diagnoses     Liver replaced by transplant (H)    -  1       Follow-ups after your visit        Follow-up notes from your care team     Return in about 3 months (around 7/25/2017).      Your next 10 appointments already scheduled     Jul 18, 2017 10:15 AM CDT   Lab with  LAB   [facility] ([facility])    [address]  [address] [place] [facility] [address] [phone]            Jul 18, 2017 11:15 AM CDT   (Arrive by 11:00 AM)   Return General Liver with Juan Simms MD   [facility] Hepatology ([facility])    [address]  [address] [place] [facility] [address] [phone]              Who to contact     If you have questions or need follow up information about today's clinic visit or your schedule please contact [facility] HEPATOLOGY directly at [phone].  Normal or non-critical lab and imaging results will be communicated to you by MyChart, letter or phone within 4 business days after the clinic has received the results. If you do not hear from us within 7 days, please contact the clinic through [employer] or phone. If you have a critical or abnormal lab result, we will notify you by phone as soon as possible.  Submit refill requests through [employer] or call your pharmacy and they will forward the refill request to us. Please allow 3 business days for your refill to be completed.          Additional Information About Your Visit        MyChart Information     [employer] gives you secure access to your electronic health record. If you see a primary care provider, you can also send messages to your care team and make appointments. If you have questions, please call your primary  "care clinic.  If you do not have a primary care provider, please call [phone] and they will assist you.        Care EveryWhere ID     This is your Care EveryWhere ID. This could be used by other organizations to access your [place] medical records  [id number]        Your Vitals Were     Pulse Temperature Respirations Height Pulse Oximetry BMI (Body Mass Index)    75 98.6  F (37  C) (Oral) 20 1.829 m (6' 0.01\") 97% 37.23 kg/m2       Blood Pressure from Last 3 Encounters:   04/25/17 (!) 169/108   01/23/17 (!) 152/96   11/21/16 (!) 165/95    Weight from Last 3 Encounters:   04/25/17 124.6 kg (274 lb 9.6 oz)   01/23/17 117.3 kg (258 lb 11.2 oz)   11/21/16 111.9 kg (246 lb 11.2 oz)              Today, you had the following     No orders found for display       Primary Care Provider Office Phone # Fax #    Ki Powers [phone]       [facility] [address] [address]        Thank you!     Thank you for choosing [facility] HEPATOLOG[facility]  for your care. Our goal is always to provide you with excellent care. Hearing back from our patients is one way we can continue to improve our services. Please take a few minutes to complete the written survey that you may receive in the mail after your visit with us. Thank you!             Your Updated Medication List - Protect others around you: Learn how to safely use, store and throw away your medicines at www.disposemymeds.org.          This list is accurate as of: 4/25/17 11:59 PM.  Always use your most recent med list.                   Brand Name Dispense Instructions for use    acetaminophen 500 MG tablet    TYLENOL     Take 1,000 mg by mouth every 6 hours as needed for mild pain       amLODIPine 5 MG tablet    NORVASC    90 tablet    Take 1 tablet (5 mg) by mouth daily       aspirin 81 MG tablet     30 tablet    Take 1 tablet (81 mg) by mouth daily       * multivitamin, therapeutic with minerals Tabs tablet      Take " 1 tablet by mouth daily       * multivitamin  with lutein Caps per capsule      Take 1 capsule by mouth daily       * order for DME     1 each    Equipment being ordered: Class 2 (30-40mmHg) compression knee high stockings, night time knee high compression alternative, bandaging supplies       * order for DME     1 each    Equipment being ordered: Compression knee high stockings for B LE lymphedema 20-30mmHg       tacrolimus capsule     720 capsule    Take 4 capsules (4 mg) by mouth every 12 hours       Vitamin D3 5000 UNITS Tbdp      Reported on 4/25/2017       * Notice:  This list has 4 medication(s) that are the same as other medications prescribed for you. Read the directions carefully, and ask your doctor or other care provider to review them with you.

## 2017-05-24 ENCOUNTER — TELEPHONE (OUTPATIENT)
Dept: TRANSPLANT | Facility: CLINIC | Age: 52
End: 2017-05-24

## 2017-05-24 DIAGNOSIS — D84.9 IMMUNOSUPPRESSION (H): ICD-10-CM

## 2017-05-24 DIAGNOSIS — Z94.4 LIVER TRANSPLANTED (H): ICD-10-CM

## 2017-05-24 RX ORDER — TACROLIMUS 5 MG/1
5 CAPSULE, GELATIN COATED ORAL EVERY 12 HOURS
Qty: 60 CAPSULE | Refills: 11 | Status: SHIPPED | OUTPATIENT
Start: 2017-05-24 | End: 2017-07-19 | Stop reason: DRUGHIGH

## 2017-05-24 NOTE — TELEPHONE ENCOUNTER
Called Blanco again.  He answered.  He was aware that his labs were abnormal last week but he didn't call me.  I told him that if he ever notices this please call me.  He was recently weaned off cellcept and his prograf level is low.  He will increase his prograf to 5 mg po bid and repeat hepatic panel tomorrow.

## 2017-05-24 NOTE — LETTER
OUTPATIENT OR TRANSITIONAL CARE  LABORATORY TEST ORDER  [facility] fax: [phone]    Patient Name: Blanco Osborne  Transplant Date: 9/20/2016   YOB: 1965  Issue Date: May 24, 2017   [facility] MR#: [id number]  Expiration Date:   May 24, 2018         Diagnoses: [x]      Liver Transplant (ICD-10 Z94.4)    [x]      Long term use of medications (ICD-10 Z79.899)     [x]      Lab results to be available on the same day drawn  [x]      Patient to be given their results so they can phone them to [facility] before 4:00 pm    Frequency: once on 5/25/2017         [x]      Hepatic panel (Albumin, Alk Phos, ALT, AST, LDH, Direct and Total Bili)      If you have any questions, please call [facility]- [phone] or [phone],                                          Fax- [phone].  .

## 2017-05-25 ENCOUNTER — TELEPHONE (OUTPATIENT)
Dept: TRANSPLANT | Facility: CLINIC | Age: 52
End: 2017-05-25

## 2017-05-25 DIAGNOSIS — R74.01 ELEVATED TRANSAMINASE LEVEL: Primary | ICD-10-CM

## 2017-05-25 DIAGNOSIS — Z94.4 LIVER TRANSPLANTED (H): ICD-10-CM

## 2017-05-25 NOTE — TELEPHONE ENCOUNTER
Haven't seen today's repeat labs.  Dr. Simsm wants Blanco to have a liver biopsy.  LM for Blanco, order placed.  Also told Blanco to hold asa.

## 2017-05-26 ENCOUNTER — TELEPHONE (OUTPATIENT)
Dept: TRANSPLANT | Facility: CLINIC | Age: 52
End: 2017-05-26

## 2017-05-26 NOTE — TELEPHONE ENCOUNTER
Received request from patient's Transplant Coordinator, Jerrica Alamraz, to see if Interventional Radiology could get the patient in sooner then 5/31/17. Interventional Radiology had a opening for a 0830 check in on 5/30. I called the patient and left him 2 messages regarding possibly moving his Liver Biopsy to 5/30/17. The patient did not return my call. I finally was able to get a hold of the patient at 1400. I explained that we were trying to get him scheduled for 5/30/17 instead of 5/31/17. The patient stated he was out for a walk and would look at his schedule and return my call & would give me an answer at that time. I asked for a return call by 1600 as IR schedulers leave at 1430. Pt never called me back. I left the patient a detailed VM letting him know he is still scheduled for his Liver Bx on 5/31/17.

## 2017-05-30 ENCOUNTER — TELEPHONE (OUTPATIENT)
Dept: INTERVENTIONAL RADIOLOGY/VASCULAR | Facility: CLINIC | Age: 52
End: 2017-05-30

## 2017-05-31 ENCOUNTER — HOSPITAL ENCOUNTER (OUTPATIENT)
Facility: CLINIC | Age: 52
Discharge: HOME | End: 2017-05-31
Attending: INTERNAL MEDICINE | Admitting: INTERNAL MEDICINE
Payer: COMMERCIAL

## 2017-05-31 ENCOUNTER — APPOINTMENT (OUTPATIENT)
Dept: INTERVENTIONAL RADIOLOGY/VASCULAR | Facility: CLINIC | Age: 52
End: 2017-05-31
Attending: INTERNAL MEDICINE
Payer: COMMERCIAL

## 2017-05-31 ENCOUNTER — APPOINTMENT (OUTPATIENT)
Dept: MEDSURG UNIT | Facility: CLINIC | Age: 52
End: 2017-05-31
Attending: INTERNAL MEDICINE
Payer: COMMERCIAL

## 2017-05-31 VITALS
WEIGHT: 270 LBS | HEIGHT: 72 IN | BODY MASS INDEX: 36.57 KG/M2 | RESPIRATION RATE: 16 BRPM | OXYGEN SATURATION: 98 % | DIASTOLIC BLOOD PRESSURE: 88 MMHG | HEART RATE: 80 BPM | SYSTOLIC BLOOD PRESSURE: 148 MMHG | TEMPERATURE: 97.9 F

## 2017-05-31 DIAGNOSIS — Z94.4 LIVER TRANSPLANTED (H): ICD-10-CM

## 2017-05-31 DIAGNOSIS — R74.01 ELEVATED TRANSAMINASE LEVEL: ICD-10-CM

## 2017-05-31 LAB
ALT SERPL W P-5'-P-CCNC: 61 U/L (ref 0–70)
AST SERPL W P-5'-P-CCNC: 45 U/L (ref 0–45)
BASOPHILS # BLD AUTO: 0 10E9/L (ref 0–0.2)
BASOPHILS NFR BLD AUTO: 0.9 %
DIFFERENTIAL METHOD BLD: ABNORMAL
EOSINOPHIL # BLD AUTO: 0.1 10E9/L (ref 0–0.7)
EOSINOPHIL NFR BLD AUTO: 1.8 %
ERYTHROCYTE [DISTWIDTH] IN BLOOD BY AUTOMATED COUNT: 14.9 % (ref 10–15)
HCT VFR BLD AUTO: 33.4 % (ref 40–53)
HGB BLD-MCNC: 11.1 G/DL (ref 13.3–17.7)
IMM GRANULOCYTES # BLD: 0 10E9/L (ref 0–0.4)
IMM GRANULOCYTES NFR BLD: 0.3 %
INR PPP: 1.2 (ref 0.86–1.14)
LYMPHOCYTES # BLD AUTO: 1.6 10E9/L (ref 0.8–5.3)
LYMPHOCYTES NFR BLD AUTO: 47.4 %
MCH RBC QN AUTO: 35 PG (ref 26.5–33)
MCHC RBC AUTO-ENTMCNC: 33.2 G/DL (ref 31.5–36.5)
MCV RBC AUTO: 105 FL (ref 78–100)
MONOCYTES # BLD AUTO: 0.4 10E9/L (ref 0–1.3)
MONOCYTES NFR BLD AUTO: 12.3 %
NEUTROPHILS # BLD AUTO: 1.3 10E9/L (ref 1.6–8.3)
NEUTROPHILS NFR BLD AUTO: 37.3 %
NRBC # BLD AUTO: 0 10*3/UL
NRBC BLD AUTO-RTO: 0 /100
PLATELET # BLD AUTO: 97 10E9/L (ref 150–450)
RBC # BLD AUTO: 3.17 10E12/L (ref 4.4–5.9)
WBC # BLD AUTO: 3.4 10E9/L (ref 4–11)

## 2017-05-31 PROCEDURE — 88313 SPECIAL STAINS GROUP 2: CPT | Performed by: PHYSICIAN ASSISTANT

## 2017-05-31 PROCEDURE — 99152 MOD SED SAME PHYS/QHP 5/>YRS: CPT

## 2017-05-31 PROCEDURE — 76942 ECHO GUIDE FOR BIOPSY: CPT

## 2017-05-31 PROCEDURE — 85025 COMPLETE CBC W/AUTO DIFF WBC: CPT | Performed by: RADIOLOGY

## 2017-05-31 PROCEDURE — 88342 IMHCHEM/IMCYTCHM 1ST ANTB: CPT | Performed by: PHYSICIAN ASSISTANT

## 2017-05-31 PROCEDURE — 84450 TRANSFERASE (AST) (SGOT): CPT | Performed by: INTERNAL MEDICINE

## 2017-05-31 PROCEDURE — 84460 ALANINE AMINO (ALT) (SGPT): CPT | Performed by: INTERNAL MEDICINE

## 2017-05-31 PROCEDURE — 88365 INSITU HYBRIDIZATION (FISH): CPT | Performed by: PHYSICIAN ASSISTANT

## 2017-05-31 PROCEDURE — 88341 IMHCHEM/IMCYTCHM EA ADD ANTB: CPT | Performed by: PHYSICIAN ASSISTANT

## 2017-05-31 PROCEDURE — 25000128 H RX IP 250 OP 636: Performed by: PHYSICIAN ASSISTANT

## 2017-05-31 PROCEDURE — 85610 PROTHROMBIN TIME: CPT | Performed by: RADIOLOGY

## 2017-05-31 PROCEDURE — 40000166 ZZH STATISTIC PP CARE STAGE 1

## 2017-05-31 PROCEDURE — 88307 TISSUE EXAM BY PATHOLOGIST: CPT | Performed by: PHYSICIAN ASSISTANT

## 2017-05-31 PROCEDURE — 25000125 ZZHC RX 250: Performed by: PHYSICIAN ASSISTANT

## 2017-05-31 RX ORDER — FLUMAZENIL 0.1 MG/ML
0.2 INJECTION, SOLUTION INTRAVENOUS
Status: DISCONTINUED | OUTPATIENT
Start: 2017-05-31 | End: 2017-05-31 | Stop reason: HOSPADM

## 2017-05-31 RX ORDER — SODIUM CHLORIDE 9 MG/ML
INJECTION, SOLUTION INTRAVENOUS CONTINUOUS
Status: DISCONTINUED | OUTPATIENT
Start: 2017-05-31 | End: 2017-05-31 | Stop reason: HOSPADM

## 2017-05-31 RX ORDER — LIDOCAINE 40 MG/G
CREAM TOPICAL
Status: DISCONTINUED | OUTPATIENT
Start: 2017-05-31 | End: 2017-05-31 | Stop reason: HOSPADM

## 2017-05-31 RX ORDER — NALOXONE HYDROCHLORIDE 0.4 MG/ML
.1-.4 INJECTION, SOLUTION INTRAMUSCULAR; INTRAVENOUS; SUBCUTANEOUS
Status: DISCONTINUED | OUTPATIENT
Start: 2017-05-31 | End: 2017-05-31 | Stop reason: HOSPADM

## 2017-05-31 RX ORDER — FENTANYL CITRATE 50 UG/ML
25-50 INJECTION, SOLUTION INTRAMUSCULAR; INTRAVENOUS EVERY 5 MIN PRN
Status: DISCONTINUED | OUTPATIENT
Start: 2017-05-31 | End: 2017-05-31 | Stop reason: HOSPADM

## 2017-05-31 RX ADMIN — MIDAZOLAM 1 MG: 1 INJECTION INTRAMUSCULAR; INTRAVENOUS at 09:57

## 2017-05-31 RX ADMIN — SODIUM CHLORIDE: 9 INJECTION, SOLUTION INTRAVENOUS at 09:18

## 2017-05-31 RX ADMIN — FENTANYL CITRATE 50 MCG: 50 INJECTION INTRAMUSCULAR; INTRAVENOUS at 09:57

## 2017-05-31 NOTE — PROGRESS NOTES
Interventional Radiology Intra-procedural Nursing Note    Patient Name: Blanco Osborne  Medical Record Number: [id number]  Today's Date: May 31, 2017    Start Time:0948  End of procedure time: 1020  Procedure: US GUIDED RANDOM TX LIVER BIOPSYReport given to: 2 A RN  Time pt departs: 1035                                                                                                      : NO    Other Notes: Escorted from 2A after ID et  verbally verified by pt. Has had recents  Elevation in LFT's in ttransplanted Liver. Here for random Biopsy to r/o rejection. Lab tube sent for routine LFT's per pt request et OK's by JEANCARLOS Morales. To called @ 1000 et all agreed w/ pt et procedure.    Elizabeth M. Agerbeck

## 2017-05-31 NOTE — PROGRESS NOTES
Interventional Radiology Pre-Procedure Sedation Assessment   Time of Assessment: 9:00 AM    Expected Level: Moderate Sedation    Indication: Sedation is required for the following type of Procedure: Biopsy    Sedation and procedural consent: Risks, benefits and alternatives were discussed with Patient    PO Intake: Appropriately NPO for procedure    ASA Class: Class 2 - MILD SYSTEMIC DISEASE, NO ACUTE PROBLEMS, NO FUNCTIONAL LIMITATIONS.    Mallampati: Grade 2:  Soft palate, base of uvula, tonsillar pillars, and portion of posterior pharyngeal wall visible    Lungs: Lungs Clear with good breath sounds bilaterally    Heart: Normal heart sounds and rate    History and physical reviewed and no updates needed. I have reviewed the lab findings, diagnostic data, medications, and the plan for sedation. I have determined this patient to be an appropriate candidate for the planned sedation and procedure and have reassessed the patient IMMEDIATELY PRIOR to sedation and procedure.    Debbie Tapia PA-C

## 2017-05-31 NOTE — IP AVS SNAPSHOT
[address] [address]                                       After Visit Summary   5/31/2017    Blanco Osborne    MRN: [id number]           After Visit Summary Signature Page     I have received my discharge instructions, and my questions have been answered. I have discussed any challenges I see with this plan with the nurse or doctor.    ..........................................................................................................................................  Patient/Patient Representative Signature      ..........................................................................................................................................  Patient Representative Print Name and Relationship to Patient    ..................................................               ................................................  Date                                            Time    ..........................................................................................................................................  Reviewed by Signature/Title    ...................................................              ..............................................  Date                                                            Time

## 2017-05-31 NOTE — PROCEDURES
Interventional Radiology Brief Post Procedure Note    Procedure: IR LIVER BIOPSY PERCUTANEOUS    Proceduralist: Jack Dillon PA-C    Assistant: Jack Dillon PA-C    Time Out: Prior to the start of the procedure and with procedural staff participation, I verbally confirmed the patient s identity using two indicators, relevant allergies, that the procedure was appropriate and matched the consent or emergent situation, and that the correct equipment/implants were available. Immediately prior to starting the procedure I conducted the Time Out with the procedural staff and re-confirmed the patient s name, procedure, and site/side. (The Joint Commission universal protocol was followed.)  Yes    Sedation: IR Nurse Monitored Care   Post Procedure Summary:  Prior to the start of the procedure and with procedural staff participation, I verbally confirmed the patient s identity using two indicators, relevant allergies, that the procedure was appropriate and matched the consent or emergent situation, and that the correct equipment/implants were available. Immediately prior to starting the procedure I conducted the Time Out with the procedural staff and re-confirmed the patient s name, procedure, and site/side. (The Joint Commission universal protocol was followed.)  Yes       Sedatives: Fentanyl and Midazolam (Versed)    Vital signs, airway and pulse oximetry were monitored and remained stable throughout the procedure and sedation was maintained until the procedure was complete.  The patient was monitored by staff until sedation discharge criteria were met.    Patient tolerance: Patient tolerated the procedure well with no immediate complications.    Time of sedation in minutes: 30 Minutes minutes from beginning to end of physician one to one monitoring.        Findings: Completed ultrasound guided random liver biopsy. A total of two passes yielded liver tissue cores collected for pathological evaluation.  No  immediate complications. Dx: Elevated transaminase level.  [place]. 30 1 50    Estimated Blood Loss: Minimal    SPECIMENS: Core needle biopsy specimens sent for pathological analysis    Complications: 1. None     Condition: Stable    Plan:     Comments: See dictated procedure note for full details.    Jack Dillon PA-C

## 2017-05-31 NOTE — PROGRESS NOTES
Patient tolerated recovery stage well. VSS, right mid abd site clean/dry/intact, no hematoma, and denies pain. Patient tolerated PO food and fluids. Teaching was done and discharge instructions were given. Patient ambulated, voided, and PIV was removed. Patient discharged from the hospital via wheel chair to home with his friend.

## 2017-05-31 NOTE — DISCHARGE INSTRUCTIONS
[facility]    Interventional Radiology  Patient Instructions Following Biopsy    AFTER YOU GO HOME  ? If you were given sedation DO NOT drive or operate machinery at home or at work for at least 24 hours  ? DO relax and take it easy for 48 hours, no strenuous activity for 24 hours  ? DO drink plenty of fluids  ? DO resume your regular diet, unless otherwise instructed by your Primary Physician  ? Keep the dressing dry and in place for 24 hours.   ? DO NOT SMOKE FOR AT LEAST 24 HOURS, if you have been given any medications that were to help you relax or sedate you during your procedure  ? DO NOT drink alcoholic beverages the day of your procedure  ? DO NOT do any strenuous exercise or lifting (> 10 lbs) for at least 7 days following your procedure  ? DO NOT take a bath or shower for at least 12 hours following your procedure  ? Remove dressing after shower the next day. Replace with Band aid for 2 days.  Never leave a wet dressing in place.  ? DO NOT make any important or legal decisions for 24 hours following your procedure  ? There should be minimum drainage from the biopsy site    CALL THE PHYSICIAN IF:  ? You start bleeding from the procedure site.  If you do start to bleed from that site, lie down flat and hold pressure on the site for a minimum of 10 minutes.  Your physician will tell you if you need to return to the hospital  ? You develop nausea or vomiting  ? You have excessive swelling, redness, or tenderness at the site  ? You have drainage that looks like it is infected.  ? You experience severe pain  ? You develop hives or a rash or unexplained itching  ? You develop shortness of breath  ? You develop a temperature of 101 degrees F or greater  ? You develop chest pain or cough up blood, lightheadedness or fainting        [facility] INTERVENTIONAL RADIOLOGY DEPARTMENT  Procedure Physician:   JEANCARLOS Mckinnon             Date of procedure: May 31, 2017  Telephone  Numbers: [phone] Monday-Friday 8:00 am to 4:30 pm  [phone] After 4:30 pm Monday-Friday, Weekends & Holidays.   Ask for the Interventional Radiologist on call.  Someone is on call 24 hrs/day  [facility] toll free number: [phone] Monday-Friday 8:00 am to 4:30 pm  [facility] Emergency Dept: [phone]

## 2017-05-31 NOTE — PROGRESS NOTES
Patient returned from IR post-liver biopsy.  Denies pain.  Puncture site right med abdomen dry & intact.  VSS.  Taking po fluids & food without difficulty.

## 2017-05-31 NOTE — PROGRESS NOTES
Patient prepped for liver biopsy.  Has chronic lower abdomen pain, described as aching, usually goes unnoticed with distractions of ADL, most intense after exercise.  Labs pending.  Consent signed.  H & P current.

## 2017-05-31 NOTE — IP AVS SNAPSHOT
MRN:[id number]                      After Visit Summary   5/31/2017    Blanco Osborne    MRN: [id number]           Visit Information        Department      5/31/2017  8:22 AM [address] [facility] [place]          Review of your medicines      UNREVIEWED medicines. Ask your doctor about these medicines        Dose / Directions    acetaminophen 500 MG tablet   Commonly known as:  TYLENOL        Dose:  1000 mg   Take 1,000 mg by mouth every 6 hours as needed for mild pain   Refills:  0       amLODIPine 5 MG tablet   Commonly known as:  NORVASC   Used for:  Benign essential hypertension        Dose:  5 mg   Take 1 tablet (5 mg) by mouth daily   Quantity:  90 tablet   Refills:  3       aspirin 81 MG tablet   Used for:  Immunosuppression (H), Liver transplanted (H)        Dose:  81 mg   Take 1 tablet (81 mg) by mouth daily   Quantity:  30 tablet   Refills:  11       * multivitamin, therapeutic with minerals Tabs tablet        Dose:  1 tablet   Take 1 tablet by mouth daily   Refills:  0       * multivitamin  with lutein Caps per capsule        Dose:  1 capsule   Take 1 capsule by mouth daily   Refills:  0       tacrolimus capsule   Used for:  Liver transplanted (H), Immunosuppression (H)        Dose:  5 mg   Take 1 capsule (5 mg) by mouth every 12 hours   Quantity:  60 capsule   Refills:  11       Vitamin D3 5000 UNITS Tbdp        Reported on 4/25/2017   Refills:  0       * Notice:  This list has 2 medication(s) that are the same as other medications prescribed for you. Read the directions carefully, and ask your doctor or other care provider to review them with you.      CONTINUE these medicines which have NOT CHANGED        Dose / Directions    * order for DME   Used for:  Lymphedema of both lower extremities        Equipment being ordered: Class 2 (30-40mmHg) compression knee high stockings, night time knee high compression alternative, bandaging supplies   Quantity:  1 each   Refills:  0       * order for  DME   Used for:  Lymphedema of both lower extremities        Equipment being ordered: Compression knee high stockings for B LE lymphedema 20-30mmHg   Quantity:  1 each   Refills:  0       * Notice:  This list has 2 medication(s) that are the same as other medications prescribed for you. Read the directions carefully, and ask your doctor or other care provider to review them with you.             Protect others around you: Learn how to safely use, store and throw away your medicines at www.disposemymeds.org.         Follow-ups after your visit        Your next 10 appointments already scheduled     Jul 18, 2017 10:15 AM CDT   Lab with  LAB   [facility] ([facility])    [address]  1st Floor  [facility] [id number] [phone]            Jul 18, 2017 11:15 AM CDT   (Arrive by 11:00 AM)   Return General Liver with Juan Simms MD   [facility] Hepatology ([facility])    [address]  [address] [facility] [id number] [phone]               Care Instructions        Further instructions from your care team       [facility]    Interventional Radiology  Patient Instructions Following Biopsy    AFTER YOU GO HOME  ? If you were given sedation DO NOT drive or operate machinery at home or at work for at least 24 hours  ? DO relax and take it easy for 48 hours, no strenuous activity for 24 hours  ? DO drink plenty of fluids  ? DO resume your regular diet, unless otherwise instructed by your Primary Physician  ? Keep the dressing dry and in place for 24 hours.   ? DO NOT SMOKE FOR AT LEAST 24 HOURS, if you have been given any medications that were to help you relax or sedate you during your procedure  ? DO NOT drink alcoholic beverages the day of your procedure  ? DO NOT do any strenuous exercise or lifting (> 10 lbs) for at least 7 days following your procedure  ? DO NOT take a bath or shower for at least 12 hours following  your procedure  ? Remove dressing after shower the next day. Replace with Band aid for 2 days.  Never leave a wet dressing in place.  ? DO NOT make any important or legal decisions for 24 hours following your procedure  ? There should be minimum drainage from the biopsy site    CALL THE PHYSICIAN IF:  ? You start bleeding from the procedure site.  If you do start to bleed from that site, lie down flat and hold pressure on the site for a minimum of 10 minutes.  Your physician will tell you if you need to return to the hospital  ? You develop nausea or vomiting  ? You have excessive swelling, redness, or tenderness at the site  ? You have drainage that looks like it is infected.  ? You experience severe pain  ? You develop hives or a rash or unexplained itching  ? You develop shortness of breath  ? You develop a temperature of 101 degrees F or greater  ? You develop chest pain or cough up blood, lightheadedness or fainting        [facility] INTERVENTIONAL RADIOLOGY DEPARTMENT  Procedure Physician:   JEANCARLOS Mckinnon             Date of procedure: May 31, 2017  Telephone Numbers: [phone] Monday-Friday 8:00 am to 4:30 pm  [phone] After 4:30 pm Monday-Friday, Weekends & Holidays.   Ask for the Interventional Radiologist on call.  Someone is on call 24 hrs/day  [facility] toll free number: [phone] Monday-Friday 8:00 am to 4:30 pm  [facility] Emergency Dept: [phone]           Additional Information About Your Visit        [employer] Information     [employer] gives you secure access to your electronic health record. If you see a primary care provider, you can also send messages to your care team and make appointments. If you have questions, please call your primary care clinic.  If you do not have a primary care provider, please call [phone] and they will assist you.        Care EveryWhere ID     This is your Care EveryWhere ID. This could be used by other organizations to access your [place] medical records  [id number]         Your Vitals Were     Blood Pressure Pulse Temperature Respirations Height Weight    125/73 (BP Location: Left arm) 80 97.9  F (36.6  C) (Oral) 16 1.829 m (6') 122.5 kg (270 lb)    Pulse Oximetry BMI (Body Mass Index)                99% 36.62 kg/m2           Primary Care Provider Office Phone # Fax #    Ki Powers [phone]      Thank you!     Thank you for choosing [facility] for your care. Our goal is always to provide you with excellent care. Hearing back from our patients is one way we can continue to improve our services. Please take a few minutes to complete the written survey that you may receive in the mail after you visit with us. Thank you!             Medication List: This is a list of all your medications and when to take them. Check marks below indicate your daily home schedule. Keep this list as a reference.      Medications           Morning Afternoon Evening Bedtime As Needed    acetaminophen 500 MG tablet   Commonly known as:  TYLENOL   Take 1,000 mg by mouth every 6 hours as needed for mild pain                                amLODIPine 5 MG tablet   Commonly known as:  NORVASC   Take 1 tablet (5 mg) by mouth daily                                aspirin 81 MG tablet   Take 1 tablet (81 mg) by mouth daily                                * multivitamin, therapeutic with minerals Tabs tablet   Take 1 tablet by mouth daily                                * multivitamin  with lutein Caps per capsule   Take 1 capsule by mouth daily                                * order for DME   Equipment being ordered: Class 2 (30-40mmHg) compression knee high stockings, night time knee high compression alternative, bandaging supplies                                * order for DME   Equipment being ordered: Compression knee high stockings for B LE lymphedema 20-30mmHg                                tacrolimus capsule   Take 1 capsule (5 mg) by mouth every 12 hours                                 Vitamin D3 5000 UNITS Tbdp   Reported on 4/25/2017                                * Notice:  This list has 4 medication(s) that are the same as other medications prescribed for you. Read the directions carefully, and ask your doctor or other care provider to review them with you.

## 2017-06-01 ENCOUNTER — TELEPHONE (OUTPATIENT)
Dept: TRANSPLANT | Facility: CLINIC | Age: 52
End: 2017-06-01

## 2017-06-01 NOTE — TELEPHONE ENCOUNTER
REviewed liver biopsy result w/ Blanco.  Talked about the importance of not gaining weight, eating a balanced diet, not high in carbs, eating vegetables, no alcohol.  WE just increased prograf a bit last week so will recheck labs next week, if OK, decrease lab frequency.    Biopsy report staff messaged to Dr. Simms for review / additional input.

## 2017-06-05 LAB — COPATH REPORT: NORMAL

## 2017-07-12 ENCOUNTER — TELEPHONE (OUTPATIENT)
Dept: GASTROENTEROLOGY | Facility: CLINIC | Age: 52
End: 2017-07-12

## 2017-07-18 ENCOUNTER — OFFICE VISIT (OUTPATIENT)
Dept: GASTROENTEROLOGY | Facility: CLINIC | Age: 52
End: 2017-07-18
Attending: INTERNAL MEDICINE
Payer: COMMERCIAL

## 2017-07-18 VITALS
OXYGEN SATURATION: 97 % | SYSTOLIC BLOOD PRESSURE: 171 MMHG | BODY MASS INDEX: 38.74 KG/M2 | TEMPERATURE: 98.4 F | HEART RATE: 71 BPM | DIASTOLIC BLOOD PRESSURE: 115 MMHG | HEIGHT: 72 IN | WEIGHT: 286 LBS

## 2017-07-18 DIAGNOSIS — Z13.220 LIPID SCREENING: ICD-10-CM

## 2017-07-18 DIAGNOSIS — K75.81 NONALCOHOLIC STEATOHEPATITIS (NASH): Primary | ICD-10-CM

## 2017-07-18 DIAGNOSIS — I10 BENIGN ESSENTIAL HYPERTENSION: ICD-10-CM

## 2017-07-18 DIAGNOSIS — Z94.4 LIVER REPLACED BY TRANSPLANT (H): ICD-10-CM

## 2017-07-18 LAB
ALBUMIN SERPL-MCNC: 3.7 G/DL (ref 3.4–5)
ALP SERPL-CCNC: 93 U/L (ref 40–150)
ALT SERPL W P-5'-P-CCNC: 40 U/L (ref 0–70)
ANION GAP SERPL CALCULATED.3IONS-SCNC: 7 MMOL/L (ref 3–14)
AST SERPL W P-5'-P-CCNC: 35 U/L (ref 0–45)
BILIRUB DIRECT SERPL-MCNC: 0.3 MG/DL (ref 0–0.2)
BILIRUB SERPL-MCNC: 1 MG/DL (ref 0.2–1.3)
BUN SERPL-MCNC: 26 MG/DL (ref 7–30)
CALCIUM SERPL-MCNC: 8.3 MG/DL (ref 8.5–10.1)
CHLORIDE SERPL-SCNC: 104 MMOL/L (ref 94–109)
CO2 SERPL-SCNC: 25 MMOL/L (ref 20–32)
CREAT SERPL-MCNC: 1.34 MG/DL (ref 0.66–1.25)
ERYTHROCYTE [DISTWIDTH] IN BLOOD BY AUTOMATED COUNT: 14 % (ref 10–15)
GFR SERPL CREATININE-BSD FRML MDRD: 56 ML/MIN/1.7M2
GLUCOSE SERPL-MCNC: 94 MG/DL (ref 70–99)
HCT VFR BLD AUTO: 36.7 % (ref 40–53)
HGB BLD-MCNC: 12.9 G/DL (ref 13.3–17.7)
INR PPP: 1.23 (ref 0.86–1.14)
MAGNESIUM SERPL-MCNC: 1.7 MG/DL (ref 1.6–2.3)
MCH RBC QN AUTO: 36 PG (ref 26.5–33)
MCHC RBC AUTO-ENTMCNC: 35.1 G/DL (ref 31.5–36.5)
MCV RBC AUTO: 103 FL (ref 78–100)
PHOSPHATE SERPL-MCNC: 3.3 MG/DL (ref 2.5–4.5)
PLATELET # BLD AUTO: 106 10E9/L (ref 150–450)
POTASSIUM SERPL-SCNC: 4.3 MMOL/L (ref 3.4–5.3)
PROT SERPL-MCNC: 7.1 G/DL (ref 6.8–8.8)
RBC # BLD AUTO: 3.58 10E12/L (ref 4.4–5.9)
SODIUM SERPL-SCNC: 136 MMOL/L (ref 133–144)
TACROLIMUS BLD-MCNC: 10.7 UG/L (ref 5–15)
TME LAST DOSE: NORMAL H
WBC # BLD AUTO: 4 10E9/L (ref 4–11)

## 2017-07-18 PROCEDURE — 80321 ALCOHOLS BIOMARKERS 1OR 2: CPT | Performed by: INTERNAL MEDICINE

## 2017-07-18 PROCEDURE — 85610 PROTHROMBIN TIME: CPT | Performed by: INTERNAL MEDICINE

## 2017-07-18 PROCEDURE — 80048 BASIC METABOLIC PNL TOTAL CA: CPT | Performed by: INTERNAL MEDICINE

## 2017-07-18 PROCEDURE — 80076 HEPATIC FUNCTION PANEL: CPT | Performed by: INTERNAL MEDICINE

## 2017-07-18 PROCEDURE — 36415 COLL VENOUS BLD VENIPUNCTURE: CPT | Performed by: INTERNAL MEDICINE

## 2017-07-18 PROCEDURE — 99212 OFFICE O/P EST SF 10 MIN: CPT | Mod: ZF

## 2017-07-18 PROCEDURE — 84100 ASSAY OF PHOSPHORUS: CPT | Performed by: INTERNAL MEDICINE

## 2017-07-18 PROCEDURE — 83735 ASSAY OF MAGNESIUM: CPT | Performed by: INTERNAL MEDICINE

## 2017-07-18 PROCEDURE — 80197 ASSAY OF TACROLIMUS: CPT | Performed by: INTERNAL MEDICINE

## 2017-07-18 PROCEDURE — 85027 COMPLETE CBC AUTOMATED: CPT | Performed by: INTERNAL MEDICINE

## 2017-07-18 RX ORDER — ATENOLOL 50 MG/1
50 TABLET ORAL DAILY
Qty: 180 TABLET | Refills: 3 | Status: SHIPPED | OUTPATIENT
Start: 2017-07-18 | End: 2018-03-05

## 2017-07-18 ASSESSMENT — PAIN SCALES - GENERAL: PAINLEVEL: MILD PAIN (3)

## 2017-07-18 NOTE — NURSING NOTE
Chief Complaint   Patient presents with     RECHECK     Post Liver TXP   Pt roomed, vitals, meds, and allergies reviewed with pt. Pt ready for provider.  Benedict Weber, CMA

## 2017-07-18 NOTE — MR AVS SNAPSHOT
After Visit Summary   7/18/2017    Blanco Osborne    MRN: [id number]           Patient Information     Date Of Birth          1965        Visit Information        Provider Department      7/18/2017 11:15 AM Juan Simms MD [facility] Hepatology        Today's Diagnoses     Nonalcoholic steatohepatitis (CARRILLO)    -  1    Benign essential hypertension           Follow-ups after your visit        Additional Services     WEIGHT MANAGEMENT/ [facility] LIFESTYLE PROGRAM REFERRAL       To schedule an appointment, please call the [facility] Sports Medicine Clinic at  [phone] or [facility] at [phone].                  Follow-up notes from your care team     Return in about 3 months (around 10/18/2017).      Your next 10 appointments already scheduled     Sep 19, 2017  8:30 AM CDT   Lab with  LAB    Health Lab ([facility])    [address] [place] [id number] [phone]            Sep 19, 2017  9:00 AM CDT   (Arrive by 8:45 AM)   New Weight Management Visit with Milton Hernandez MD   [facility] Sports Medicine ([facility])    [address] [place] [id number] [phone]            Sep 19, 2017  9:00 AM CDT   (Arrive by 8:45 AM)   New Weight Management Visit with Citlali West RD   [facility] Sports Medicine ([facility])    [address] [place] [address] [id number] [phone]            Oct 31, 2017 10:00 AM CDT   Lab with  LAB    [facility] Lab ([facility])    [address] [place] [id number] [phone]            Oct 31, 2017 11:00 AM CDT   (Arrive by 10:45 AM)   Return General Liver with Juan Simms MD   [facility] Hepatology ([facility])    [address] [place] [id number] [phone]              Who to contact     If you have questions or  need follow up information about today's clinic visit or your schedule please contact [facility] HEPATOLOGY directly at [phone].  Normal or non-critical lab and imaging results will be communicated to you by [employer], letter or phone within 4 business days after the clinic has received the results. If you do not hear from us within 7 days, please contact the clinic through [employer] or phone. If you have a critical or abnormal lab result, we will notify you by phone as soon as possible.  Submit refill requests through [employer] or call your pharmacy and they will forward the refill request to us. Please allow 3 business days for your refill to be completed.          Additional Information About Your Visit        [employer] Information     [employer] gives you secure access to your electronic health record. If you see a primary care provider, you can also send messages to your care team and make appointments. If you have questions, please call your primary care clinic.  If you do not have a primary care provider, please call [phone] and they will assist you.        Care EveryWhere ID     This is your Care EveryWhere ID. This could be used by other organizations to access your [place] medical records  [id number]        Your Vitals Were     Pulse Temperature Height Pulse Oximetry BMI (Body Mass Index)       71 98.4  F (36.9  C) (Oral) 1.829 m (6') 97% 38.79 kg/m2        Blood Pressure from Last 3 Encounters:   07/18/17 (!) 171/115   05/31/17 148/88   04/25/17 (!) 169/108    Weight from Last 3 Encounters:   07/18/17 129.7 kg (286 lb)   05/31/17 122.5 kg (270 lb)   04/25/17 124.6 kg (274 lb 9.6 oz)              We Performed the Following     WEIGHT MANAGEMENT/ P LIFESTYLE PROGRAM REFERRAL          Today's Medication Changes          These changes are accurate as of: 7/18/17 11:59 PM.  If you have any questions, ask your nurse or doctor.               Start taking these medicines.        Dose/Directions    atenolol 50  MG tablet   Commonly known as:  TENORMIN   Used for:  Benign essential hypertension   Started by:  Juan Simms MD        Dose:  50 mg   Take 1 tablet (50 mg) by mouth daily   Quantity:  180 tablet   Refills:  3            Where to get your medicines      These medications were sent to [facility] [place], [place] - [address]  [address], [facility] [address]    Hours:  TRANSPLANT PHONE NUMBER [phone] Phone:  [phone]     atenolol 50 MG tablet                Primary Care Provider Office Phone # Fax #    Ki Powers [phone]       [facility] [address] [facility] [address]  [facility] [address]        Equal Access to Services     BETH ADORNO : Hadii elizabeth ku hadasho Soomaali, waaxda luqadaha, qaybta kaalmada adeegyada, waxay idiin purnima cross. So [facility] [phone].    ATENCIÓN: Si habla español, tiene a new disposición servicios gratuitos de asistencia lingüística. [facility] [phone].    We comply with applicable federal civil rights laws and [place] laws. We do not discriminate on the basis of race, color, national origin, age, disability sex, sexual orientation or gender identity.            Thank you!     Thank you for choosing [facility] HEPATOLOGY  for your care. Our goal is always to provide you with excellent care. Hearing back from our patients is one way we can continue to improve our services. Please take a few minutes to complete the written survey that you may receive in the mail after your visit with us. Thank you!             Your Updated Medication List - Protect others around you: Learn how to safely use, store and throw away your medicines at www.disposemymeds.org.          This list is accurate as of: 7/18/17 11:59 PM.  Always use your most recent med list.                   Brand Name Dispense Instructions for use Diagnosis    acetaminophen 500 MG tablet    TYLENOL     Take 1,000 mg  by mouth every 6 hours as needed for mild pain        amLODIPine 5 MG tablet    NORVASC    90 tablet    Take 1 tablet (5 mg) by mouth daily    Benign essential hypertension       aspirin 81 MG tablet     30 tablet    Take 1 tablet (81 mg) by mouth daily    Immunosuppression (H), Liver transplanted (H)       atenolol 50 MG tablet    TENORMIN    180 tablet    Take 1 tablet (50 mg) by mouth daily    Benign essential hypertension       * multivitamin, therapeutic with minerals Tabs tablet      Take 1 tablet by mouth daily        * multivitamin  with lutein Caps per capsule      Take 1 capsule by mouth daily        * order for DME     1 each    Equipment being ordered: Class 2 (30-40mmHg) compression knee high stockings, night time knee high compression alternative, bandaging supplies    Lymphedema of both lower extremities       * order for DME     1 each    Equipment being ordered: Compression knee high stockings for B LE lymphedema 20-30mmHg    Lymphedema of both lower extremities       tacrolimus capsule     60 capsule    Take 1 capsule (5 mg) by mouth every 12 hours    Liver transplanted (H), Immunosuppression (H)       Vitamin D3 5000 UNITS Tbdp      Reported on 4/25/2017        * Notice:  This list has 4 medication(s) that are the same as other medications prescribed for you. Read the directions carefully, and ask your doctor or other care provider to review them with you.

## 2017-07-18 NOTE — LETTER
7/18/2017      RE: Blanco Osborne  [address]  [facility] [facility] [address]       I had the pleasure of seeing Blanco Osborne for followup in the Liver Transplantation Clinic at the [facility] on 07/18/2017.  Mr. 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       He is doing well at this time.  He denies any abdominal pain.  He does complain of some numbness around his incision.  He denies any itching, skin rash or fatigue.  He denies any increased abdominal girth or lower extremity edema.  He denies any fevers or chills, cough or shortness of breath.  He denies any nausea or vomiting, diarrhea or constipation.  His appetite has been good, and his weight has been stable.  He does need to lose some weight and is struggling to do so..      He did have an admission for elevated liver tests 2 months back. Liver biopsy did show some active steatohepatitis without any evidence of rejection.    Current Outpatient Prescriptions   Medication     atenolol (TENORMIN) 50 MG tablet     acetaminophen (TYLENOL) 500 MG tablet     multivitamin  with lutein (OCUVITE WITH LTEIN) CAPS per capsule     Cholecalciferol (VITAMIN D3) 5000 UNITS TBDP     order for DME     amLODIPine (NORVASC) 5 MG tablet     aspirin 81 MG tablet     order for DME     multivitamin, therapeutic with minerals (THERA-VIT-M) TABS     tacrolimus (PROGRAF - GENERIC EQUIVALENT) 1 MG capsule     No current facility-administered medications for this visit.      B/P: 171/115, T: 98.4, P: 71, R: Data Unavailable    HEENT exam shows no scleral icterus and no temporal muscle wasting.  Chest is clear.  Abdominal exam shows no increase in girth.  No masses or tenderness to palpation are present.  His liver is 10 cm in span without left lobe enlargement.   His incision is intact.  No spleen tip is palpable, and extremity exam shows no edema.  Skin  exam shows no stigmata of chronic liver disease.   eurologic exam is nonfocal.     His most recent laboratory studies show white count is 0.9 hemoglobin 12.9. Sodium is 136 potassium 4.3, BU and is 26 and creatinine is 1.4. His AST is 35, ALP 40 and alkaline phosphatase is 93. His albumin is 2.7 with a total protein of 7.1 his total bilirubin is 1.0. His colon this level is 10.7.    My impression is that Mr. Osborne is doing well now 10 months status post liver transplantation. As I mentioned, he does have some active steatohepatitis on his liver biopsy and he does need to lose some weight. Otherwise his liver and kidney function are excellent. My plan will be to see the patient back in the clinic again in 3 months.    Thank you very much for allowing me to participate in the care of this patient. If you have any questions regarding the recommendations, please do not hesitate to contact me.      Juan Simms MD      Professor of Medicine  [employer]      Executive Medical Director, Solid Organ Transplant Program  [facility]

## 2017-07-19 DIAGNOSIS — Z94.4 LIVER TRANSPLANTED (H): Primary | ICD-10-CM

## 2017-07-19 LAB — ETHYL GLUCURONIDE UR QL: NORMAL

## 2017-07-19 RX ORDER — TACROLIMUS 1 MG/1
4 CAPSULE ORAL EVERY 12 HOURS
Qty: 240 CAPSULE | Refills: 11 | Status: SHIPPED | OUTPATIENT
Start: 2017-07-19 | End: 2017-09-21

## 2017-07-19 NOTE — TELEPHONE ENCOUNTER
Spoke with pt to advise of dose change, tacrolimus decrease to 4 mg BID. Pt only has 5 mg capsules and was instructed to keep taking these until he receives the 1 mg capsules and can take the decreased dose. Pt verbalized understanding and had no questions at this time.

## 2017-07-19 NOTE — NURSING NOTE
Here for post liver transplant follow-up.   Reviewed recent labs and assisted with interpretation.  Weight continues to increase.  Blanco knows the importance of losing weight as primary disease was NAFLD.  Dr. Simms referred to weight loss clinic.  BP also elevated.  Dr. Simms added tenormin.  Suggested he stay in touch w/ PCP for good BP management.    Complaints:  Elevated BP    Current immunosuppression:    Prograf monotherapy    Med changes: Added tenormin.      Lab frequency:  q 2-3 mo    Follow-up:  Hepatology, derm and PCP annually. Reviewed need for annual follow-up here with hepatology and dermatology.

## 2017-07-19 NOTE — TELEPHONE ENCOUNTER
Tacrolimus level 10.7.    Please instruct patient to decrease tacrolimus dose to 4 mg in the morning and 4 mg in the evening.

## 2017-07-25 NOTE — PROGRESS NOTES
I had the pleasure of seeing Blanco Osborne for followup in the Liver Transplantation Clinic at the [facility] on 07/18/2017.  Mr. 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       He is doing well at this time.  He denies any abdominal pain.  He does complain of some numbness around his incision.  He denies any itching, skin rash or fatigue.  He denies any increased abdominal girth or lower extremity edema.  He denies any fevers or chills, cough or shortness of breath.  He denies any nausea or vomiting, diarrhea or constipation.  His appetite has been good, and his weight has been stable.  He does need to lose some weight and is struggling to do so..      He did have an admission for elevated liver tests 2 months back. Liver biopsy did show some active steatohepatitis without any evidence of rejection.    Current Outpatient Prescriptions   Medication     atenolol (TENORMIN) 50 MG tablet     acetaminophen (TYLENOL) 500 MG tablet     multivitamin  with lutein (OCUVITE WITH LTEIN) CAPS per capsule     Cholecalciferol (VITAMIN D3) 5000 UNITS TBDP     order for DME     amLODIPine (NORVASC) 5 MG tablet     aspirin 81 MG tablet     order for DME     multivitamin, therapeutic with minerals (THERA-VIT-M) TABS     tacrolimus (PROGRAF - GENERIC EQUIVALENT) 1 MG capsule     No current facility-administered medications for this visit.      B/P: 171/115, T: 98.4, P: 71, R: Data Unavailable    HEENT exam shows no scleral icterus and no temporal muscle wasting.  Chest is clear.  Abdominal exam shows no increase in girth.  No masses or tenderness to palpation are present.  His liver is 10 cm in span without left lobe enlargement.  His incision is intact.  No spleen tip is palpable, and extremity exam shows no edema.  Skin exam shows no stigmata of chronic liver disease.   eurologic exam is nonfocal.     His most  recent laboratory studies show white count is 0.9 hemoglobin 12.9. Sodium is 136 potassium 4.3, BU and is 26 and creatinine is 1.4. His AST is 35, ALP 40 and alkaline phosphatase is 93. His albumin is 2.7 with a total protein of 7.1 his total bilirubin is 1.0. His colon this level is 10.7.    My impression is that Mr. Osborne is doing well now 10 months status post liver transplantation. As I mentioned, he does have some active steatohepatitis on his liver biopsy and he does need to lose some weight. Otherwise his liver and kidney function are excellent. My plan will be to see the patient back in the clinic again in 3 months.    Thank you very much for allowing me to participate in the care of this patient. If you have any questions regarding the recommendations, please do not hesitate to contact me.      Juan Simms MD      Professor of Medicine  [facility] [employer]      Executive Medical Director, Solid Organ Transplant Program  [facility]

## 2017-09-19 ENCOUNTER — OFFICE VISIT (OUTPATIENT)
Dept: ORTHOPEDICS | Facility: CLINIC | Age: 52
End: 2017-09-19

## 2017-09-19 VITALS
WEIGHT: 294 LBS | DIASTOLIC BLOOD PRESSURE: 89 MMHG | BODY MASS INDEX: 39.82 KG/M2 | HEIGHT: 72 IN | SYSTOLIC BLOOD PRESSURE: 151 MMHG

## 2017-09-19 DIAGNOSIS — E66.9 OBESITY, UNSPECIFIED: ICD-10-CM

## 2017-09-19 DIAGNOSIS — Z94.4 LIVER REPLACED BY TRANSPLANT (H): ICD-10-CM

## 2017-09-19 DIAGNOSIS — E66.9 OBESITY, UNSPECIFIED: Primary | ICD-10-CM

## 2017-09-19 DIAGNOSIS — Z13.220 LIPID SCREENING: ICD-10-CM

## 2017-09-19 DIAGNOSIS — Z71.3 DIETARY COUNSELING: Primary | ICD-10-CM

## 2017-09-19 LAB
ALBUMIN SERPL-MCNC: 3.6 G/DL (ref 3.4–5)
ALP SERPL-CCNC: 108 U/L (ref 40–150)
ALT SERPL W P-5'-P-CCNC: 45 U/L (ref 0–70)
ANION GAP SERPL CALCULATED.3IONS-SCNC: 5 MMOL/L (ref 3–14)
AST SERPL W P-5'-P-CCNC: 40 U/L (ref 0–45)
BILIRUB DIRECT SERPL-MCNC: 0.3 MG/DL (ref 0–0.2)
BILIRUB SERPL-MCNC: 1.1 MG/DL (ref 0.2–1.3)
BUN SERPL-MCNC: 21 MG/DL (ref 7–30)
CALCIUM SERPL-MCNC: 9 MG/DL (ref 8.5–10.1)
CHLORIDE SERPL-SCNC: 103 MMOL/L (ref 94–109)
CO2 SERPL-SCNC: 29 MMOL/L (ref 20–32)
CREAT SERPL-MCNC: 1.54 MG/DL (ref 0.66–1.25)
ERYTHROCYTE [DISTWIDTH] IN BLOOD BY AUTOMATED COUNT: 13.9 % (ref 10–15)
GFR SERPL CREATININE-BSD FRML MDRD: 47 ML/MIN/1.7M2
GLUCOSE SERPL-MCNC: 105 MG/DL (ref 70–99)
HCT VFR BLD AUTO: 34.1 % (ref 40–53)
HGB BLD-MCNC: 11.8 G/DL (ref 13.3–17.7)
INR PPP: 1.15 (ref 0.86–1.14)
MAGNESIUM SERPL-MCNC: 1.9 MG/DL (ref 1.6–2.3)
MCH RBC QN AUTO: 35.2 PG (ref 26.5–33)
MCHC RBC AUTO-ENTMCNC: 34.6 G/DL (ref 31.5–36.5)
MCV RBC AUTO: 102 FL (ref 78–100)
PHOSPHATE SERPL-MCNC: 3.5 MG/DL (ref 2.5–4.5)
PLATELET # BLD AUTO: 97 10E9/L (ref 150–450)
POTASSIUM SERPL-SCNC: 5.1 MMOL/L (ref 3.4–5.3)
PROT SERPL-MCNC: 7.2 G/DL (ref 6.8–8.8)
RBC # BLD AUTO: 3.35 10E12/L (ref 4.4–5.9)
SODIUM SERPL-SCNC: 137 MMOL/L (ref 133–144)
TACROLIMUS BLD-MCNC: 10.1 UG/L (ref 5–15)
TME LAST DOSE: NORMAL H
WBC # BLD AUTO: 3.7 10E9/L (ref 4–11)

## 2017-09-19 NOTE — MR AVS SNAPSHOT
After Visit Summary   9/19/2017    Blanco Osborne    MRN: [id number]           Patient Information     Date Of Birth          1965        Visit Information        Provider Department      9/19/2017 9:00 AM Miltno Hernandez MD [facility] Sports Medicine        Care Instructions    DIET ASSESSMENT/PLAN:    1. Track food intake via moment diary (or momento, journey, youate).     PHYSICAL ACTIVITY ASSESSMENT/PLAN:    Start tracking your steps. Obtain baseline and then increase by 500 steps/day every week.     Start low weight amount lifting 3 times per week.     Go to the gym daily for elliptical and weight lifting          Follow-ups after your visit        Your next 10 appointments already scheduled     Oct 31, 2017 10:00 AM CDT   Lab with  LAB   [facility] Lab ([facility])    [address] [place] [address] [phone]            Oct 31, 2017 11:00 AM CDT   (Arrive by 10:45 AM)   Return General Liver with Juan Simms MD   [facility] Hepatology ([facility])    [address] [place] [address] [phone]              Who to contact     Please call your clinic at [phone] to:    Ask questions about your health    Make or cancel appointments    Discuss your medicines    Learn about your test results    Speak to your doctor   If you have compliments or concerns about an experience at your clinic, or if you wish to file a complaint, please contact [facility] Patient Relations at [phone] or email us at [email or web address] [email or web address]         Additional Information About Your Visit        MyChart Information     [facility] gives you secure access to your electronic health record. If you see a primary care provider, you can also send messages to your care team and make appointments. If you have questions, please call your primary care clinic.  If you do not have  "a primary care provider, please call [phone] and they will assist you.      [employer] is an electronic gateway that provides easy, online access to your medical records. With [employer], you can request a clinic appointment, read your test results, renew a prescription or communicate with your care team.     To access your existing account, please contact your [facility] or call [phone] for assistance.        Care EveryWhere ID     This is your Care EveryWhere ID. This could be used by other organizations to access your [facility] medical records  [id number]        Your Vitals Were     Height BMI (Body Mass Index)                1.816 m (5' 11.5\") 40.43 kg/m2           Blood Pressure from Last 3 Encounters:   09/19/17 151/89   07/18/17 (!) 171/115   05/31/17 148/88    Weight from Last 3 Encounters:   09/19/17 133.4 kg (294 lb)   07/18/17 129.7 kg (286 lb)   05/31/17 122.5 kg (270 lb)              Today, you had the following     No orders found for display       Primary Care Provider Office Phone # Fax #    Ki Powers [phone]       [facility] [address]  [place] [address]        Equal Access to Services     BETH ADORNO : Hadii elizabeth thorntono Sowilliali, waaxda luqadaha, qaybta kaalmada adeegyada, enrico villalobos . So [facility] [phone].    ATENCIÓN: Si habla español, tiene a new disposición servicios gratuitos de asistencia lingüística. Llame al [phone].    We comply with applicable federal civil rights laws and [place] laws. We do not discriminate on the basis of race, color, national origin, age, disability sex, sexual orientation or gender identity.            Thank you!     Thank you for choosing [facility]  for your care. Our goal is always to provide you with excellent care. Hearing back from our patients is one way we can continue to improve our services. Please take a few " minutes to complete the written survey that you may receive in the mail after your visit with us. Thank you!             Your Updated Medication List - Protect others around you: Learn how to safely use, store and throw away your medicines at www.disposemymeds.org.          This list is accurate as of: 9/19/17  9:33 AM.  Always use your most recent med list.                   Brand Name Dispense Instructions for use Diagnosis    acetaminophen 500 MG tablet    TYLENOL     Take 1,000 mg by mouth every 6 hours as needed for mild pain        amLODIPine 5 MG tablet    NORVASC    90 tablet    Take 1 tablet (5 mg) by mouth daily    Benign essential hypertension       aspirin 81 MG tablet     30 tablet    Take 1 tablet (81 mg) by mouth daily    Immunosuppression (H), Liver transplanted (H)       atenolol 50 MG tablet    TENORMIN    180 tablet    Take 1 tablet (50 mg) by mouth daily    Benign essential hypertension       * multivitamin, therapeutic with minerals Tabs tablet      Take 1 tablet by mouth daily        * multivitamin  with lutein Caps per capsule      Take 1 capsule by mouth daily        * order for DME     1 each    Equipment being ordered: Class 2 (30-40mmHg) compression knee high stockings, night time knee high compression alternative, bandaging supplies    Lymphedema of both lower extremities       * order for DME     1 each    Equipment being ordered: Compression knee high stockings for B LE lymphedema 20-30mmHg    Lymphedema of both lower extremities       tacrolimus 1 MG capsule    GENERIC EQUIVALENT    240 capsule    Take 4 capsules (4 mg) by mouth every 12 hours    Liver transplanted (H)       Vitamin D3 5000 UNITS Tbdp      Reported on 4/25/2017        * Notice:  This list has 4 medication(s) that are the same as other medications prescribed for you. Read the directions carefully, and ask your doctor or other care provider to review them with you.

## 2017-09-19 NOTE — PATIENT INSTRUCTIONS
DIET ASSESSMENT/PLAN:    1. Track food intake via moment diary (or momento, journey, youate).     PHYSICAL ACTIVITY ASSESSMENT/PLAN:    Start tracking your steps. Obtain baseline and then increase by 500 steps/day every week.     Start low weight amount lifting 3 times per week.     Go to the gym daily for elliptical and weight lifting

## 2017-09-19 NOTE — MR AVS SNAPSHOT
After Visit Summary   9/19/2017    Blanco Osborne    MRN: [id number]           Patient Information     Date Of Birth          1965        Visit Information        Provider Department      9/19/2017 9:00 AM Citlali West RD M [facility] Sports Medicine        Today's Diagnoses     Dietary counseling    -  1    Obesity, unspecified           Follow-ups after your visit        Your next 10 appointments already scheduled     Oct 03, 2017 10:30 AM CDT   (Arrive by 10:15 AM)   Return Weight Management Visit with DEANNA Arias [facility] Sports Medicine ([facility])    [address]  5th Floor  [facility] [address]   [phone]            Oct 17, 2017 11:30 AM CDT   (Arrive by 11:15 AM)   Return Weight Management Visit with DEANNA Arias [facility] Medicine ([facility])    [address] [place] [address]  5th [facility] [address]   [phone]            Oct 31, 2017 10:00 AM CDT   Lab with  LAB   [facility] ([facility])    [address] [place] [address]  1st Floor  [facility] [address]   [phone]            Oct 31, 2017 11:00 AM CDT   (Arrive by 10:45 AM)   Return General Liver with Juan Simms MD   [facility] Hepatology ([facility])    [address] [place] [address]  3rd Floor  [facility] [address]   [phone]            Nov 06, 2017  1:00 PM CST   Return Weight Management Visit with Citlali West RD   [facility] ([facility])    [place] [address] [facility] [address]   [phone]            Nov 20, 2017  1:00 PM CST   Return Weight Management Visit with Citlali West RD   [facility] ([facility])    [facility]  [address]  [facility] [address]   [phone]            Dec 04, 2017  1:00 PM CST   Return Weight Management Visit with Citlali West RD   [facility] ([facility])    [address]  [facility] [address]   [phone]              Who to contact     Please call your clinic at [phone] to:    Ask questions about your health    Make or cancel appointments    Discuss your medicines    Learn about your test results    Speak to your doctor   If you have compliments or concerns about an experience at your clinic, or if you wish to file a complaint, please contact [facility] Patient Relations at [phone] or email us at [email or web address]         Additional Information About Your Visit        [employer] Information     [employer] gives you secure access to your electronic health record. If you see a primary care provider, you can also send messages to your care team and make appointments. If you have questions, please call your primary care clinic.  If you do not have a primary care provider, please call [phone] and they will assist you.      [employer] is an electronic gateway that provides easy, online access to your medical records. With [employer], you can request a clinic appointment, read your test results, renew a prescription or communicate with your care team.     To access your existing account, please contact your [facility] or call [phone] for assistance.        Care EveryWhere ID     This is your Care EveryWhere ID. This could be used by other organizations to access your [facility] medical records  [id number]         Blood Pressure from Last 3 Encounters:   09/19/17 151/89   07/18/17 (!) 171/115   05/31/17 148/88    Weight from Last 3 Encounters:   09/19/17 294 lb (133.4 kg)   07/18/17 286 lb (129.7 kg)   05/31/17 270 lb (122.5 kg)              We Performed the Following     MNT INDIVIDUAL INITIAL EA 15 MIN        Primary Care Provider Office Phone # Fax #    Ki Powers [phone]       [place] [address] [address]  [place] [address] [address]        Equal Access to Services     BETH ADORNO : Hadii aad ku hadnicholasfelipe Wolfali, wacalebda luqadaha, qaybta anujmaenrico zamora. So [facility] [phone].    ATENCIÓN: Si habla español, tiene a new disposición servicios gratuitos de asistencia lingüística. Christianame al [phone].    We comply with applicable federal civil rights laws and [place] laws. We do not discriminate on the basis of race, color, national origin, age, disability sex, sexual orientation or gender identity.            Thank you!     Thank you for choosing [facility]  for your care. Our goal is always to provide you with excellent care. Hearing back from our patients is one way we can continue to improve our services. Please take a few minutes to complete the written survey that you may receive in the mail after your visit with us. Thank you!             Your Updated Medication List - Protect others around you: Learn how to safely use, store and throw away your medicines at www.disposemymeds.org.          This list is accurate as of: 9/19/17 12:35 PM.  Always use your most recent med list.                   Brand Name Dispense Instructions for use Diagnosis    acetaminophen 500 MG tablet    TYLENOL     Take 1,000 mg by mouth every 6 hours as needed for mild pain        amLODIPine 5 MG tablet    NORVASC    90 tablet    Take 1 tablet (5 mg) by mouth daily    Benign essential hypertension       aspirin 81 MG tablet     30 tablet    Take 1 tablet (81 mg) by mouth daily    Immunosuppression (H), Liver transplanted (H)       atenolol 50 MG tablet    TENORMIN    180 tablet    Take 1 tablet (50 mg) by mouth daily    Benign essential hypertension       * multivitamin, therapeutic with minerals Tabs tablet      Take 1 tablet by mouth daily        * multivitamin  with lutein Caps per capsule      Take 1 capsule by mouth daily        * order for DME     1 each    Equipment  being ordered: Class 2 (30-40mmHg) compression knee high stockings, night time knee high compression alternative, bandaging supplies    Lymphedema of both lower extremities       * order for DME     1 each    Equipment being ordered: Compression knee high stockings for B LE lymphedema 20-30mmHg    Lymphedema of both lower extremities       tacrolimus 1 MG capsule    GENERIC EQUIVALENT    240 capsule    Take 4 capsules (4 mg) by mouth every 12 hours    Liver transplanted (H)       Vitamin D3 5000 UNITS Tbdp      Reported on 4/25/2017        * Notice:  This list has 4 medication(s) that are the same as other medications prescribed for you. Read the directions carefully, and ask your doctor or other care provider to review them with you.

## 2017-09-19 NOTE — PROGRESS NOTES
INTRODUCTION: Mr. Blanco Osborne is a 52 year old y.o. male with obesity who is also s/p Liver Transplant due to liver failure from [facility].   He presents for his initial visit to the Lifestyle Medicine Program for Weight Management referred by Dr. Simms, Hepatology.      Mr. Osborne reports a history of obesity since College (went to [employer] and then grad school at [employer]).  .    He was at 320 lbs when his liver was failing. He then lost weight down to 240 lbs. After surgery, he was 217 lbs. Now, has gained about 75 lbs and is at todays' weight of 294 lbs.      After transplant, had trouble exercising. Now, he feels ready to start exercising.   Also, after transplant, he was encouraged to eat large amounts to build back strength. He was then advised to go back to a regular diet but Blanco admits that he's been unable to make the transition. .     Has two kids, 12 and 18 (does not see them much due to divorce). Would like to be able to be more active with them. 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 Has backed off from working as much, but has picked up some recently as he has felt better.    Patient Supplied Answers To Sports Med Lifestyle Weight Management Intake Questionnaire:     Sports Medicine Lifestyle Management Questionnaire Responses  Reasons for coming:  Lifestyle Mgmt Reason for Coming 9/19/2017   Were you referred here? Yes   If you were referred here who referred you? dr enid simms   Please describe your reasons for coming to this weight management program: liver transplant     Dietary history:  Lifestyle Mgmt Dietary History 9/19/2017   Stress makes me eat Mostly false   Feeling sad makes me eat Definitely false   Feeling lonely makes me eat Definitely false   Do you go on eating binges even though you are not hungry? No       Sleep Habits:  No flowsheet data found.    Physical Activity History:  Lifestyle Mgmt Physical Activity History 9/19/2017   MILD EXERCISE (MINIMAL  "EFFECT) Number of times per week 4   Do you have access to a gymnasium? No         Review of systems:   Pain in feet. Occasional low back. No chest pain. Some shortness of breath.      Past Medical History:  has a past medical history of Ascites; Cirrhosis of liver with ascites (H) (2/11/2016); H/O alcohol abuse; HTN (hypertension); NAFLD (nonalcoholic fatty liver disease) (2/11/2016); CHRISTIAN on CPAP; and SBP (spontaneous bacterial peritonitis) (H).    Speciality comments:   x 2. Has a \"girlfriend\", Ofe for past 11 years.   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  Works as a .   Since liver transplant, this is part time work.     Lives in [place].   Enjoys golf.    Lab Results  Results for orders placed or performed in visit on 09/19/17   CBC with platelets   Result Value Ref Range    WBC 3.7 (L) 4.0 - 11.0 10e9/L    RBC Count 3.35 (L) 4.4 - 5.9 10e12/L    Hemoglobin 11.8 (L) 13.3 - 17.7 g/dL    Hematocrit 34.1 (L) 40.0 - 53.0 %     (H) 78 - 100 fl    MCH 35.2 (H) 26.5 - 33.0 pg    MCHC 34.6 31.5 - 36.5 g/dL    RDW 13.9 10.0 - 15.0 %    Platelet Count 97 (L) 150 - 450 10e9/L   Basic metabolic panel   Result Value Ref Range    Sodium 137 133 - 144 mmol/L    Potassium 5.1 3.4 - 5.3 mmol/L    Chloride 103 94 - 109 mmol/L    Carbon Dioxide 29 20 - 32 mmol/L    Anion Gap 5 3 - 14 mmol/L    Glucose 105 (H) 70 - 99 mg/dL    Urea Nitrogen 21 7 - 30 mg/dL    Creatinine 1.54 (H) 0.66 - 1.25 mg/dL    GFR Estimate 47 (L) >60 mL/min/1.7m2    GFR Estimate If Black 57 (L) >60 mL/min/1.7m2    Calcium 9.0 8.5 - 10.1 mg/dL   Phosphorus   Result Value Ref Range    Phosphorus 3.5 2.5 - 4.5 mg/dL   Magnesium   Result Value Ref Range    Magnesium 1.9 1.6 - 2.3 mg/dL   Hepatic panel   Result Value Ref Range    Bilirubin Direct 0.3 (H) 0.0 - 0.2 mg/dL    Bilirubin Total 1.1 0.2 - 1.3 mg/dL    Albumin 3.6 3.4 - 5.0 g/dL    Protein Total 7.2 6.8 - 8.8 g/dL    Alkaline Phosphatase 108 40 - 150 U/L    ALT 45 0 - 70 " "U/L    AST 40 0 - 45 U/L   INR   Result Value Ref Range    INR 1.15 (H) 0.86 - 1.14         DIETARY History:     B: Shrimp, noodles, marinara sauce OR fruit juice sometimes  S:  L: [place] or hamburger when eating out yesterday: [place] sandwich from [employer] with water  S:  D: Chicken 6 oz, baked potato, asparagus, ice cream    Beverages: 1 diet pop a day, no alcohol since transplant    PHYSICAL activity history:   Enjoys walking. Walks \"every other day\" about 30 mins. Does NOT track steps.   Goes to gym \"seldom\". Once per week uses elliptical or stationary bike in [place].   Plays golf once/month.     PHYSICAL EXAM:    Lifestyle Weight Tracking 9/19/2017   Lifestyle /89   Lifestyle Ht 5' 11.5\"   Starting Wt 294 lb   Lifestyle Wt 294 lb   Lifestyle % Wt Change 0   Lifestyle Body Fat % 35   Lifestyle BMI 40.52   Lifestyle BMR 2457        Mr. Osborne is well and in no apparent distress. Able to rise from a seated position without difficulty and ambulate with a normal gait.     HEENT: Noteworthy for small oropharyngeal opening  CV: Regular rate and rhythm without murmer  Lungs: Clear to Ausculation  Abdomen: Large well healed surgical C-shaped scar over upper abdomen. Otherwise, soft and without tenderness. No HSM.   Musculoskeletal: No limitations noted in hips, knees or lower legs and feet    IMPRESSION: 52 year old y.o. with obesity who is motivated to lose weight in order to lose weight and avoid further liver or other internal problems.     Obesity, complicated by:    History of liver transplant     Initial goal is to achieve a 5% weight loss of 15 lbs (i.e. A body weight of 279 lbs) within the next 3-4 months.    We hope that Blanco Osborne can do this through sustainable lifestyle changes.    DIET ASSESSMENT/PLAN:    1. Track food intake via moment diary (or momento, journey, youate).     PHYSICAL ACTIVITY ASSESSMENT/PLAN:    Start tracking your steps. Obtain baseline and then increase by 500 " steps/day every week.     Start low weight amount lifting 3 times per week.     Go to the gym daily for elliptical and weight lifting    Miscellaneous Issues:     May need new CPAP. Please ask Dr. Powers for referral    Follow-up:    In 2 weeks with Citlali West, MS, RD, LD, CSSD    In 6 weeks with Dr. Hernandez    Over 50% of the 45 minute face-to-face clinic visit time was spent in counseling regarding strategies to achieve lifestyle changes in diet and physical activity as described above.   --Milton Hernandez MD

## 2017-09-19 NOTE — PROGRESS NOTES
"Referring provider: Mendez Osborne  is a 52 year old male presents today for new nutrition consultation.      Vitals:  There were no vitals taken for this visit.      DIETARY History:      B: Shrimp, noodles, marinara sauce OR fruit juice sometimes  S:  L: [place] or hamburger when eating out yesterday: [place] sandwich from [employer] with water  S:  D: Chicken 6 oz, baked potato, asparagus, ice cream     Beverages: 1 diet pop a day, no alcohol since transplant     PHYSICAL activity history:   Enjoys walking. Walks \"every other day\" about 30 mins. Does NOT track steps.   Goes to gym \"seldom\". Once per week uses elliptical or stationary bike in [place].   Plays golf once/month.      Time with Patient:  30 Minutes    Nutrition  DX:.   1. Dietary counseling    2. Obesity, unspecified        Impression:     Nutrition Goals:   1. Track food intake via moment diary (or momento, journey, youate).      Citlali West, MS, RD, CSSD, LD  [facility]     "

## 2017-09-19 NOTE — LETTER
9/19/2017      RE: Blanco Osborne  [address]  [facility] [address]       INTRODUCTION: Mr. Blanco Osborne is a 52 year old y.o. male with obesity who is also s/p Liver Transplant due to liver failure from CARRILLO.   He presents for his initial visit to the Lifestyle Medicine Program for Weight Management referred by Dr. Simms, Hepatology.      Mr. Osborne reports a history of obesity since College (went to [employer] and then grad school at [employer]).  .    He was at 320 lbs when his liver was failing. He then lost weight down to 240 lbs. After surgery, he was 217 lbs. Now, has gained about 75 lbs and is at todays' weight of 294 lbs.      After transplant, had trouble exercising. Now, he feels ready to start exercising.   Also, after transplant, he was encouraged to eat large amounts to build back strength. He was then advised to go back to a regular diet but Blanco admits that he's been unable to make the transition. .     Has two kids, 12 and 18 (does not see them much due to divorce). Would like to be able to be more active with them. 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 Has backed off from working as much, but has picked up some recently as he has felt better.    Patient Supplied Answers To Sports Med Lifestyle Weight Management Intake Questionnaire:     Sports Medicine Lifestyle Management Questionnaire Responses  Reasons for coming:  Lifestyle Mgmt Reason for Coming 9/19/2017   Were you referred here? Yes   If you were referred here who referred you? dr enid simms   Please describe your reasons for coming to this weight management program: liver transplant     Dietary history:  Lifestyle Mgmt Dietary History 9/19/2017   Stress makes me eat Mostly false   Feeling sad makes me eat Definitely false   Feeling lonely makes me eat Definitely false   Do you go on eating binges even though you are not hungry? No       Sleep Habits:  No flowsheet data found.    Physical  "Activity History:  Lifestyle Mgmt Physical Activity History 9/19/2017   MILD EXERCISE (MINIMAL EFFECT) Number of times per week 4   Do you have access to a gymnasium? No         Review of systems:   Pain in feet. Occasional low back. No chest pain. Some shortness of breath.      Past Medical History:  has a past medical history of Ascites; Cirrhosis of liver with ascites (H) (2/11/2016); H/O alcohol abuse; HTN (hypertension); NAFLD (nonalcoholic fatty liver disease) (2/11/2016); CHRISTIAN on CPAP; and SBP (spontaneous bacterial peritonitis) (H).    Speciality comments:   x 2. Has a \"girlfriend\", Ofe for past 11 years.   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  Works as a .   Since liver transplant, this is part time work.     Lives in [place].   Enjoys golf.    Lab Results  Results for orders placed or performed in visit on 09/19/17   CBC with platelets   Result Value Ref Range    WBC 3.7 (L) 4.0 - 11.0 10e9/L    RBC Count 3.35 (L) 4.4 - 5.9 10e12/L    Hemoglobin 11.8 (L) 13.3 - 17.7 g/dL    Hematocrit 34.1 (L) 40.0 - 53.0 %     (H) 78 - 100 fl    MCH 35.2 (H) 26.5 - 33.0 pg    MCHC 34.6 31.5 - 36.5 g/dL    RDW 13.9 10.0 - 15.0 %    Platelet Count 97 (L) 150 - 450 10e9/L   Basic metabolic panel   Result Value Ref Range    Sodium 137 133 - 144 mmol/L    Potassium 5.1 3.4 - 5.3 mmol/L    Chloride 103 94 - 109 mmol/L    Carbon Dioxide 29 20 - 32 mmol/L    Anion Gap 5 3 - 14 mmol/L    Glucose 105 (H) 70 - 99 mg/dL    Urea Nitrogen 21 7 - 30 mg/dL    Creatinine 1.54 (H) 0.66 - 1.25 mg/dL    GFR Estimate 47 (L) >60 mL/min/1.7m2    GFR Estimate If Black 57 (L) >60 mL/min/1.7m2    Calcium 9.0 8.5 - 10.1 mg/dL   Phosphorus   Result Value Ref Range    Phosphorus 3.5 2.5 - 4.5 mg/dL   Magnesium   Result Value Ref Range    Magnesium 1.9 1.6 - 2.3 mg/dL   Hepatic panel   Result Value Ref Range    Bilirubin Direct 0.3 (H) 0.0 - 0.2 mg/dL    Bilirubin Total 1.1 0.2 - 1.3 mg/dL    Albumin 3.6 3.4 - 5.0 " "g/dL    Protein Total 7.2 6.8 - 8.8 g/dL    Alkaline Phosphatase 108 40 - 150 U/L    ALT 45 0 - 70 U/L    AST 40 0 - 45 U/L   INR   Result Value Ref Range    INR 1.15 (H) 0.86 - 1.14         DIETARY History:     B: Shrimp, noodles, marinara sauce OR fruit juice sometimes  S:  L: [place] or hamburger when eating out yesterday: [place] sandwich from [employer] with water  S:  D: Chicken 6 oz, baked potato, asparagus, ice cream    Beverages: 1 diet pop a day, no alcohol since transplant    PHYSICAL activity history:   Enjoys walking. Walks \"every other day\" about 30 mins. Does NOT track steps.   Goes to gym \"seldom\". Once per week uses elliptical or stationary bike in condominium.   Plays golf once/month.     PHYSICAL EXAM:    Lifestyle Weight Tracking 9/19/2017   Lifestyle /89   Lifestyle Ht 5' 11.5\"   Starting Wt 294 lb   Lifestyle Wt 294 lb   Lifestyle % Wt Change 0   Lifestyle Body Fat % 35   Lifestyle BMI 40.52   Lifestyle BMR 2457        Mr. Osborne is well and in no apparent distress. Able to rise from a seated position without difficulty and ambulate with a normal gait.     HEENT: Noteworthy for small oropharyngeal opening  CV: Regular rate and rhythm without murmer  Lungs: Clear to Ausculation  Abdomen: Large well healed surgical C-shaped scar over upper abdomen. Otherwise, soft and without tenderness. No HSM.   Musculoskeletal: No limitations noted in hips, knees or lower legs and feet    IMPRESSION: 52 year old y.o. with obesity who is motivated to lose weight in order to lose weight and avoid further liver or other internal problems.     Obesity, complicated by:    History of liver transplant     Initial goal is to achieve a 5% weight loss of 15 lbs (i.e. A body weight of 279 lbs) within the next 3-4 months.    We hope that Blanco Osborne can do this through sustainable lifestyle changes.    DIET ASSESSMENT/PLAN:    1. Track food intake via moment diary (or momento, journey, youate).     PHYSICAL " ACTIVITY ASSESSMENT/PLAN:    Start tracking your steps. Obtain baseline and then increase by 500 steps/day every week.     Start low weight amount lifting 3 times per week.     Go to the gym daily for elliptical and weight lifting    Miscellaneous Issues:     May need new CPAP. Please ask Dr. Powers for referral    Follow-up:    In 2 weeks with Citlali West, MS, RD, LD, CSSD    In 6 weeks with Dr. Hernandez    Over 50% of the 45 minute face-to-face clinic visit time was spent in counseling regarding strategies to achieve lifestyle changes in diet and physical activity as described above.   --Milton Hernandez MD

## 2017-09-19 NOTE — LETTER
"  9/19/2017      RE: Blanoc Osborne  [address]  [facility] [address]       Referring provider: Mendez Osborne  is a 52 year old male presents today for new nutrition consultation.      Vitals:  There were no vitals taken for this visit.      DIETARY History:      B: Shrimp, noodles, marinara sauce OR fruit juice sometimes  S:  L: [place] or hamburger when eating out yesterday: [place] sandwich from [employer] with water  S:  D: Chicken 6 oz, baked potato, asparagus, ice cream     Beverages: 1 diet pop a day, no alcohol since transplant     PHYSICAL activity history:   Enjoys walking. Walks \"every other day\" about 30 mins. Does NOT track steps.   Goes to gym \"seldom\". Once per week uses elliptical or stationary bike in condominium.   Plays golf once/month.      Time with Patient:  30 Minutes    Nutrition  DX:.   No diagnosis found.    Impression:     Nutrition Goals:   1. Track food intake via moment diary (or momento, journey, youate).      Citlali West, MS, RD, CSSD, LD  [facility]         "

## 2017-09-20 DIAGNOSIS — Z94.4 LIVER TRANSPLANTED (H): ICD-10-CM

## 2017-09-20 NOTE — TELEPHONE ENCOUNTER
Tacrolimus level 10.1, creat 1.5.      Please ask patient to decrease tacrolimus dose to 3 mg in the morning and 3 mg in the evening.

## 2017-09-21 RX ORDER — TACROLIMUS 1 MG/1
3 CAPSULE ORAL EVERY 12 HOURS
Qty: 180 CAPSULE | Refills: 11 | Status: SHIPPED | OUTPATIENT
Start: 2017-09-21 | End: 2017-10-18

## 2017-09-21 NOTE — TELEPHONE ENCOUNTER
Spoke with pt and instructed him to decrease tacrolimus to 3 mg bid. Pt repeated dose change correctly, verbalized understanding, and has no questions at this time.

## 2017-10-04 ENCOUNTER — TELEPHONE (OUTPATIENT)
Dept: PHARMACY | Facility: CLINIC | Age: 52
End: 2017-10-04

## 2017-10-17 ENCOUNTER — OFFICE VISIT (OUTPATIENT)
Dept: ORTHOPEDICS | Facility: CLINIC | Age: 52
End: 2017-10-17

## 2017-10-17 VITALS — BODY MASS INDEX: 41.44 KG/M2 | WEIGHT: 301.3 LBS

## 2017-10-17 DIAGNOSIS — Z13.220 LIPID SCREENING: ICD-10-CM

## 2017-10-17 DIAGNOSIS — K76.0 NAFLD (NONALCOHOLIC FATTY LIVER DISEASE): ICD-10-CM

## 2017-10-17 DIAGNOSIS — Z94.4 LIVER REPLACED BY TRANSPLANT (H): ICD-10-CM

## 2017-10-17 DIAGNOSIS — Z71.3 DIETARY COUNSELING: Primary | ICD-10-CM

## 2017-10-17 DIAGNOSIS — E66.9 OBESITY: ICD-10-CM

## 2017-10-17 DIAGNOSIS — Z94.4 LIVER TRANSPLANTED (H): ICD-10-CM

## 2017-10-17 LAB
ALBUMIN SERPL-MCNC: 3.4 G/DL (ref 3.4–5)
ALP SERPL-CCNC: 103 U/L (ref 40–150)
ALT SERPL W P-5'-P-CCNC: 50 U/L (ref 0–70)
ANION GAP SERPL CALCULATED.3IONS-SCNC: 6 MMOL/L (ref 3–14)
AST SERPL W P-5'-P-CCNC: 47 U/L (ref 0–45)
BILIRUB DIRECT SERPL-MCNC: 0.2 MG/DL (ref 0–0.2)
BILIRUB SERPL-MCNC: 0.8 MG/DL (ref 0.2–1.3)
BUN SERPL-MCNC: 19 MG/DL (ref 7–30)
CALCIUM SERPL-MCNC: 8.3 MG/DL (ref 8.5–10.1)
CHLORIDE SERPL-SCNC: 106 MMOL/L (ref 94–109)
CO2 SERPL-SCNC: 25 MMOL/L (ref 20–32)
CREAT SERPL-MCNC: 1.26 MG/DL (ref 0.66–1.25)
ERYTHROCYTE [DISTWIDTH] IN BLOOD BY AUTOMATED COUNT: 13.8 % (ref 10–15)
GFR SERPL CREATININE-BSD FRML MDRD: 60 ML/MIN/1.7M2
GLUCOSE SERPL-MCNC: 108 MG/DL (ref 70–99)
HCT VFR BLD AUTO: 37 % (ref 40–53)
HGB BLD-MCNC: 12.6 G/DL (ref 13.3–17.7)
INR PPP: 1.26 (ref 0.86–1.14)
MAGNESIUM SERPL-MCNC: 1.5 MG/DL (ref 1.6–2.3)
MCH RBC QN AUTO: 35 PG (ref 26.5–33)
MCHC RBC AUTO-ENTMCNC: 34.1 G/DL (ref 31.5–36.5)
MCV RBC AUTO: 103 FL (ref 78–100)
PHOSPHATE SERPL-MCNC: 3.4 MG/DL (ref 2.5–4.5)
PLATELET # BLD AUTO: 116 10E9/L (ref 150–450)
POTASSIUM SERPL-SCNC: 4.4 MMOL/L (ref 3.4–5.3)
PROT SERPL-MCNC: 6.9 G/DL (ref 6.8–8.8)
RBC # BLD AUTO: 3.6 10E12/L (ref 4.4–5.9)
SODIUM SERPL-SCNC: 138 MMOL/L (ref 133–144)
TACROLIMUS BLD-MCNC: 9.2 UG/L (ref 5–15)
TME LAST DOSE: NORMAL H
WBC # BLD AUTO: 4.1 10E9/L (ref 4–11)

## 2017-10-17 NOTE — PROGRESS NOTES
"Referring provider: Mendez Osborne  is a 52 year old male presents today for follow-up nutrition consultation.      Vitals:  Wt (!) 301 lb 4.8 oz (136.7 kg)  BMI 41.44 kg/m2  Wt Readings from Last 4 Encounters:   10/17/17 (!) 301 lb 4.8 oz (136.7 kg)   09/19/17 294 lb (133.4 kg)   07/18/17 286 lb (129.7 kg)   05/31/17 270 lb (122.5 kg)       DIETARY History:      7:00: B: Bowl of cereal, milk  S:  12:00: L: Bowl of stir henry   S:  18:30: D: Shrimp pasta     Beverages: 1 diet pop a day, no alcohol since transplant     PHYSICAL activity history:   Enjoys walking. Walks \"every other day\" about 30 mins. Does NOT track steps.   Goes to gym \"seldom\". Once per week uses elliptical or stationary bike in condominium.   Plays golf once/month.      Time with Patient:  30 Minutes    Nutrition  DX:.   1. Dietary counseling    2. Obesity    3. NAFLD (nonalcoholic fatty liver disease)    4. Liver transplanted (H)        Impression:     Nutrition Goals:   1. Track food intake via food journal  2. Created breakfast ideas to try every day:  - 3 eggs, 1 sl whole wheat toast with butter/PB, 2 pieces of fruit, 1 oz. Cheese  - 1 sl toast or 1/2 eng muffin, breakfast sausage, chicken, 2 cups fruit  - 1 cup greek yogurt, 1/4 cup granola, 2 cups fruit    Citlali West, MS, RD, CSSD, LD  [facility]     "

## 2017-10-17 NOTE — LETTER
"  10/17/2017      RE: Blanco Osborne  [address]  [facility] [address]       Referring provider: Mendez Osborne  is a 52 year old male presents today for follow-up nutrition consultation.      Vitals:  Wt (!) 301 lb 4.8 oz (136.7 kg)  BMI 41.44 kg/m2  Wt Readings from Last 4 Encounters:   10/17/17 (!) 301 lb 4.8 oz (136.7 kg)   09/19/17 294 lb (133.4 kg)   07/18/17 286 lb (129.7 kg)   05/31/17 270 lb (122.5 kg)       DIETARY History:      7:00: B: Bowl of cereal, milk  S:  12:00: L: Bowl of stir henry   S:  18:30: D: Shrimp pasta     Beverages: 1 diet pop a day, no alcohol since transplant     PHYSICAL activity history:   Enjoys walking. Walks \"every other day\" about 30 mins. Does NOT track steps.   Goes to gym \"seldom\". Once per week uses elliptical or stationary bike in condominium.   Plays golf once/month.      Time with Patient:  30 Minutes    Nutrition  DX:.   1. Dietary counseling    2. Obesity    3. NAFLD (nonalcoholic fatty liver disease)    4. Liver transplanted (H)        Impression:     Nutrition Goals:   1. Track food intake via food journal  2. Created breakfast ideas to try every day:  - 3 eggs, 1 sl whole wheat toast with butter/PB, 2 pieces of fruit, 1 oz. Cheese  - 1 sl toast or 1/2 eng muffin, breakfast sausage, chicken, 2 cups fruit  - 1 cup greek yogurt, 1/4 cup granola, 2 cups fruit    Citlali West, MS, RD, CSSD, LD  [facility]       Citlali West, DEANNA    "

## 2017-10-17 NOTE — MR AVS SNAPSHOT
After Visit Summary   10/17/2017    Blanco Osborne    MRN: [id number]           Patient Information     Date Of Birth          1965        Visit Information        Provider Department      10/17/2017 11:30 AM Citlali West RD [facility]        Today's Diagnoses     Dietary counseling    -  1    Obesity        NAFLD (nonalcoholic fatty liver disease)        Liver transplanted (H)           Follow-ups after your visit        Your next 10 appointments already scheduled     Oct 31, 2017 10:00 AM CDT   Lab with  LAB   [facility] ([facility])    [address]  1st Floor  [facility] [address] [phone]            Oct 31, 2017 11:00 AM CDT   (Arrive by 10:45 AM)   Return General Liver with Juan Simms MD   [facility] Hepatology ([facility])    [address]  3rd Floor  [facility] [id number] [phone]            Nov 06, 2017  1:00 PM CST   Return Weight Management Visit with Citlali West RD   [facility] ([facility])    [address] [address]  [facility] [place] [facility] [address]   [phone]            Nov 20, 2017  1:00 PM CST   Return Weight Management Visit with Citlali West RD   [facility] ([facility])    [place] [address]  [facility] [address]   [phone]            Dec 04, 2017  1:00 PM CST   Return Weight Management Visit with Citlali West RD   [facility] ([facility])    [place] [address]  [facility] [address]   [phone]              Who to contact     Please call your clinic at [phone] to:    Ask questions about your health    Make or cancel appointments    Discuss your medicines    Learn about your test results    Speak to your doctor   If you have compliments or concerns about an experience at your clinic, or if you wish to file a complaint,  please contact [facility] Patient Relations at [phone] or email us at [email or web address]         Additional Information About Your Visit        trueEXhart Information     Bestowed gives you secure access to your electronic health record. If you see a primary care provider, you can also send messages to your care team and make appointments. If you have questions, please call your primary care clinic.  If you do not have a primary care provider, please call [phone] and they will assist you.      Bestowed is an electronic gateway that provides easy, online access to your medical records. With Bestowed, you can request a clinic appointment, read your test results, renew a prescription or communicate with your care team.     To access your existing account, please contact your [facility] or call [phone] for assistance.        Care EveryWhere ID     This is your Care EveryWhere ID. This could be used by other organizations to access your [place] medical records  [id number]        Your Vitals Were     BMI (Body Mass Index)                   41.44 kg/m2            Blood Pressure from Last 3 Encounters:   09/19/17 151/89   07/18/17 (!) 171/115   05/31/17 148/88    Weight from Last 3 Encounters:   10/17/17 (!) 301 lb 4.8 oz (136.7 kg)   09/19/17 294 lb (133.4 kg)   07/18/17 286 lb (129.7 kg)              We Performed the Following     MNT INDIVIDUAL F/U REASSESS, EA 15 MIN        Primary Care Provider Office Phone # Fax #    Ki Powers [phone]       [facility] [address] [address]        Equal Access to Services     BETH ADORNO : Hadii elizabeth olmos Soferderick, waaxda luqadaha, qaybta kaalmada enrico akbar. So [facility] [phone].    ATENCIÓN: Si habla español, tiene a new disposición servicios gratuitos de asistencia lingüística. Llame al  [phone].    We comply with applicable federal civil rights laws and [place] laws. We do not discriminate on the basis of race, color, national origin, age, disability, sex, sexual orientation, or gender identity.            Thank you!     Thank you for choosing [facility]  for your care. Our goal is always to provide you with excellent care. Hearing back from our patients is one way we can continue to improve our services. Please take a few minutes to complete the written survey that you may receive in the mail after your visit with us. Thank you!             Your Updated Medication List - Protect others around you: Learn how to safely use, store and throw away your medicines at www.disposemymeds.org.          This list is accurate as of: 10/17/17 12:06 PM.  Always use your most recent med list.                   Brand Name Dispense Instructions for use Diagnosis    acetaminophen 500 MG tablet    TYLENOL     Take 1,000 mg by mouth every 6 hours as needed for mild pain        amLODIPine 5 MG tablet    NORVASC    90 tablet    Take 1 tablet (5 mg) by mouth daily    Benign essential hypertension       aspirin 81 MG tablet     30 tablet    Take 1 tablet (81 mg) by mouth daily    Immunosuppression (H), Liver transplanted (H)       atenolol 50 MG tablet    TENORMIN    180 tablet    Take 1 tablet (50 mg) by mouth daily    Benign essential hypertension       * multivitamin, therapeutic with minerals Tabs tablet      Take 1 tablet by mouth daily        * multivitamin  with lutein Caps per capsule      Take 1 capsule by mouth daily        * order for DME     1 each    Equipment being ordered: Class 2 (30-40mmHg) compression knee high stockings, night time knee high compression alternative, bandaging supplies    Lymphedema of both lower extremities       * order for DME     1 each    Equipment being ordered: Compression knee high stockings for B LE lymphedema 20-30mmHg    Lymphedema of both lower  extremities       tacrolimus 1 MG capsule    GENERIC EQUIVALENT    180 capsule    Take 3 capsules (3 mg) by mouth every 12 hours    Liver transplanted (H)       Vitamin D3 5000 UNITS Tbdp      Reported on 4/25/2017        * Notice:  This list has 4 medication(s) that are the same as other medications prescribed for you. Read the directions carefully, and ask your doctor or other care provider to review them with you.

## 2017-10-18 DIAGNOSIS — Z94.4 LIVER TRANSPLANTED (H): ICD-10-CM

## 2017-10-18 RX ORDER — TACROLIMUS 1 MG/1
2 CAPSULE ORAL EVERY 12 HOURS
Qty: 120 CAPSULE | Refills: 11 | Status: SHIPPED | OUTPATIENT
Start: 2017-10-18 | End: 2018-03-05

## 2017-10-18 NOTE — TELEPHONE ENCOUNTER
Tacrolimus level 9.2.    Please instruct patient to decrease tacrolimus dose to 2 mg in the morning and 2 mg in the evening.

## 2017-10-30 DIAGNOSIS — Z94.4 LIVER REPLACED BY TRANSPLANT (H): Primary | ICD-10-CM

## 2017-10-31 ENCOUNTER — OFFICE VISIT (OUTPATIENT)
Dept: GASTROENTEROLOGY | Facility: CLINIC | Age: 52
End: 2017-10-31
Attending: INTERNAL MEDICINE
Payer: COMMERCIAL

## 2017-10-31 VITALS
SYSTOLIC BLOOD PRESSURE: 176 MMHG | WEIGHT: 295 LBS | HEART RATE: 72 BPM | TEMPERATURE: 98.5 F | DIASTOLIC BLOOD PRESSURE: 119 MMHG | HEIGHT: 72 IN | BODY MASS INDEX: 39.96 KG/M2

## 2017-10-31 DIAGNOSIS — Z94.4 LIVER REPLACED BY TRANSPLANT (H): Primary | ICD-10-CM

## 2017-10-31 DIAGNOSIS — Z94.4 LIVER REPLACED BY TRANSPLANT (H): ICD-10-CM

## 2017-10-31 LAB
ALBUMIN SERPL-MCNC: 3.8 G/DL (ref 3.4–5)
ALBUMIN UR-MCNC: NEGATIVE MG/DL
ALP SERPL-CCNC: 103 U/L (ref 40–150)
ALT SERPL W P-5'-P-CCNC: 50 U/L (ref 0–70)
ANION GAP SERPL CALCULATED.3IONS-SCNC: 7 MMOL/L (ref 3–14)
APPEARANCE UR: ABNORMAL
AST SERPL W P-5'-P-CCNC: 47 U/L (ref 0–45)
BILIRUB DIRECT SERPL-MCNC: 0.3 MG/DL (ref 0–0.2)
BILIRUB SERPL-MCNC: 1.2 MG/DL (ref 0.2–1.3)
BILIRUB UR QL STRIP: NEGATIVE
BUN SERPL-MCNC: 26 MG/DL (ref 7–30)
CALCIUM SERPL-MCNC: 8 MG/DL (ref 8.5–10.1)
CHLORIDE SERPL-SCNC: 102 MMOL/L (ref 94–109)
CHOLEST SERPL-MCNC: 134 MG/DL
CO2 SERPL-SCNC: 25 MMOL/L (ref 20–32)
COLOR UR AUTO: ABNORMAL
CREAT SERPL-MCNC: 1.18 MG/DL (ref 0.66–1.25)
CREAT UR-MCNC: 315 MG/DL
ERYTHROCYTE [DISTWIDTH] IN BLOOD BY AUTOMATED COUNT: 13.8 % (ref 10–15)
GFR SERPL CREATININE-BSD FRML MDRD: 64 ML/MIN/1.7M2
GLUCOSE SERPL-MCNC: 97 MG/DL (ref 70–99)
GLUCOSE UR STRIP-MCNC: NEGATIVE MG/DL
HCT VFR BLD AUTO: 36.1 % (ref 40–53)
HDLC SERPL-MCNC: 57 MG/DL
HGB BLD-MCNC: 12.2 G/DL (ref 13.3–17.7)
HGB UR QL STRIP: NEGATIVE
HYALINE CASTS #/AREA URNS LPF: 3 /LPF (ref 0–2)
KETONES UR STRIP-MCNC: 5 MG/DL
LDLC SERPL CALC-MCNC: 58 MG/DL
LEUKOCYTE ESTERASE UR QL STRIP: NEGATIVE
MCH RBC QN AUTO: 34.8 PG (ref 26.5–33)
MCHC RBC AUTO-ENTMCNC: 33.8 G/DL (ref 31.5–36.5)
MCV RBC AUTO: 103 FL (ref 78–100)
MUCOUS THREADS #/AREA URNS LPF: PRESENT /LPF
NITRATE UR QL: NEGATIVE
NONHDLC SERPL-MCNC: 77 MG/DL
PH UR STRIP: 5 PH (ref 5–7)
PLATELET # BLD AUTO: 94 10E9/L (ref 150–450)
POTASSIUM SERPL-SCNC: 3.9 MMOL/L (ref 3.4–5.3)
PROT SERPL-MCNC: 7.2 G/DL (ref 6.8–8.8)
PROT UR-MCNC: 0.27 G/L
PROT/CREAT 24H UR: 0.09 G/G CR (ref 0–0.2)
RBC # BLD AUTO: 3.51 10E12/L (ref 4.4–5.9)
RBC #/AREA URNS AUTO: <1 /HPF (ref 0–2)
SODIUM SERPL-SCNC: 133 MMOL/L (ref 133–144)
SOURCE: ABNORMAL
SP GR UR STRIP: 1.02 (ref 1–1.03)
SQUAMOUS #/AREA URNS AUTO: <1 /HPF (ref 0–1)
TACROLIMUS BLD-MCNC: 4.5 UG/L (ref 5–15)
TME LAST DOSE: 1900 H
TRIGL SERPL-MCNC: 92 MG/DL
UROBILINOGEN UR STRIP-MCNC: 2 MG/DL (ref 0–2)
WBC # BLD AUTO: 3.7 10E9/L (ref 4–11)
WBC #/AREA URNS AUTO: 1 /HPF (ref 0–2)

## 2017-10-31 PROCEDURE — 85027 COMPLETE CBC AUTOMATED: CPT | Performed by: INTERNAL MEDICINE

## 2017-10-31 PROCEDURE — 36415 COLL VENOUS BLD VENIPUNCTURE: CPT | Performed by: INTERNAL MEDICINE

## 2017-10-31 PROCEDURE — 90686 IIV4 VACC NO PRSV 0.5 ML IM: CPT | Mod: ZF | Performed by: INTERNAL MEDICINE

## 2017-10-31 PROCEDURE — 81001 URINALYSIS AUTO W/SCOPE: CPT | Performed by: INTERNAL MEDICINE

## 2017-10-31 PROCEDURE — 80197 ASSAY OF TACROLIMUS: CPT | Performed by: INTERNAL MEDICINE

## 2017-10-31 PROCEDURE — 80061 LIPID PANEL: CPT | Performed by: INTERNAL MEDICINE

## 2017-10-31 PROCEDURE — 80321 ALCOHOLS BIOMARKERS 1OR 2: CPT | Performed by: INTERNAL MEDICINE

## 2017-10-31 PROCEDURE — 25000128 H RX IP 250 OP 636: Mod: ZF | Performed by: INTERNAL MEDICINE

## 2017-10-31 PROCEDURE — 99213 OFFICE O/P EST LOW 20 MIN: CPT | Mod: 25,ZF

## 2017-10-31 PROCEDURE — 80076 HEPATIC FUNCTION PANEL: CPT | Performed by: INTERNAL MEDICINE

## 2017-10-31 PROCEDURE — G0008 ADMIN INFLUENZA VIRUS VAC: HCPCS | Mod: ZF

## 2017-10-31 PROCEDURE — 80048 BASIC METABOLIC PNL TOTAL CA: CPT | Performed by: INTERNAL MEDICINE

## 2017-10-31 PROCEDURE — 84156 ASSAY OF PROTEIN URINE: CPT | Performed by: INTERNAL MEDICINE

## 2017-10-31 RX ADMIN — INFLUENZA A VIRUS A/MICHIGAN/45/2015 X-275 (H1N1) ANTIGEN (FORMALDEHYDE INACTIVATED), INFLUENZA A VIRUS A/HONG KONG/4801/2014 X-263B (H3N2) ANTIGEN (FORMALDEHYDE INACTIVATED), INFLUENZA B VIRUS B/PHUKET/3073/2013 ANTIGEN (FORMALDEHYDE INACTIVATED), AND INFLUENZA B VIRUS B/BRISBANE/60/2008 ANTIGEN (FORMALDEHYDE INACTIVATED) 0.5 ML: 15; 15; 15; 15 INJECTION, SUSPENSION INTRAMUSCULAR at 11:51

## 2017-10-31 ASSESSMENT — PAIN SCALES - GENERAL: PAINLEVEL: MILD PAIN (3)

## 2017-10-31 NOTE — LETTER
10/31/2017       RE: Blanco Osborne  [address]  [facility] [address]     Dear Colleague,    Thank you for referring your patient, Blanco Osborne, to the [facility] HEPATOLOGY at [facility]. Please see a copy of my visit note below.    SUBJECTIVE:   Blanco Osborne is a 52 year old year old male here for followup. He is now 15 months s/p liver transplant for cirrhosis 2/2 CARRILLO. He still endorses mild incisional pain but states it is steadily improving and is tolerable. He denies any nausea, vomiting, constipation, diarrhea, change in stool color, change in urine. He denies SOB or CP. He is trying to lose weight - he is part of a program with the sports medicine department to work on exercise and diet.     He does endorse moderate bilateral foot pain that occurs when walking on the bottom of his foot. He describes it as a soreness/numbness. He has not tried anything for it and has not seen anyone for it. It has been happening for about 2-3 months.     Review of systems:  10 point ROS negative except as noted above.    Past Medical History:   Diagnosis Date     Ascites      Cirrhosis of liver with ascites (H) 2/11/2016     H/O alcohol abuse      HTN (hypertension)      NAFLD (nonalcoholic fatty liver disease) 2/11/2016     CHRISTIAN on CPAP      SBP (spontaneous bacterial peritonitis) (H)     [facility]       Current Outpatient Prescriptions on File Prior to Visit:  tacrolimus (GENERIC EQUIVALENT) 1 MG capsule Take 2 capsules (2 mg) by mouth every 12 hours   atenolol (TENORMIN) 50 MG tablet Take 1 tablet (50 mg) by mouth daily   multivitamin  with lutein (OCUVITE WITH LTEIN) CAPS per capsule Take 1 capsule by mouth daily   Cholecalciferol (VITAMIN D3) 5000 UNITS TBDP Reported on 4/25/2017   order for DME Equipment being ordered: Compression knee high stockings for B LE lymphedema 20-30mmHg   amLODIPine (NORVASC) 5 MG tablet Take 1 tablet (5 mg) by mouth daily   aspirin 81  MG tablet Take 1 tablet (81 mg) by mouth daily   order for DME Equipment being ordered: Class 2 (30-40mmHg) compression knee high stockings, night time knee high compression alternative, bandaging supplies   multivitamin, therapeutic with minerals (THERA-VIT-M) TABS Take 1 tablet by mouth daily   acetaminophen (TYLENOL) 500 MG tablet Take 1,000 mg by mouth every 6 hours as needed for mild pain     No current facility-administered medications on file prior to visit.      OBJECTIVE:  Physical exam:  BP (!) 176/119  Pulse 72  Temp 98.5  F (36.9  C) (Oral)  Ht 1.829 m (6')  Wt 133.8 kg (295 lb)  BMI 40.01 kg/m2    Gen: NAD, alert, pleasant, cooperative, non-toxic, no jaundice  HEENT: EOMI, no scleral icterus, tracking appropriately, TESSA  Resp: CTAB, no crackles or wheezes, no increased WOB  Cardiac: RRR, no S3/S4, no M/R/G appreciated  GI: soft, non-distended, obese, large well healing semi-circular incision present on upper half of abdomen with mild tenderness to light palpation.  Ext: WWP, spontaneous movement in all 4  Neuro: AOx3, CN 2-12 grossly intact, appropriate mentation    ASSESSMENT and PLAN:   Blanco was seen today for recheck.    Diagnoses and all orders for this visit:    Liver replaced by transplant (H)  -     influenza Vac Split High-Dose (FLUZONE) injection 0.5 mL; Inject 0.5 mLs into the muscle once         -    Hepatic labs every other month    #Hypertension  Pt states BP is 130/80s at home consistently. Likely a component of weight and exertion coming into clinic combined with white coat hypertension. Will continue to monitor at follow up appointments.     #Foot pain  Likely plantar fascitis. Discussed some stretches he can do, as well as instructed him to follow up with PCP if does not improve.     Return to clinic in 6 months    Patient seen and discussed with Dr. Simms who agrees with this plan.    Kacey Dorsey MD  Internal Medicine, PGY 1    The patient was seen and examined.  The above  assessment and plan was developed jointly with the resident.      Juan Simms MD      Professor of Medicine  [employer]      Executive Medical Director, Solid Organ Transplant Program  [facility]

## 2017-10-31 NOTE — NURSING NOTE
Chief Complaint   Patient presents with     RECHECK     follow up with liver transplant, tzimmer cma       Initial BP (!) 176/119  Pulse 72  Temp 98.5  F (36.9  C) (Oral)  Ht 1.829 m (6')  Wt 133.8 kg (295 lb)  BMI 40.01 kg/m2 Estimated body mass index is 40.01 kg/(m^2) as calculated from the following:    Height as of this encounter: 1.829 m (6').    Weight as of this encounter: 133.8 kg (295 lb).  Medication Reconciliation: complete

## 2017-10-31 NOTE — MR AVS SNAPSHOT
After Visit Summary   10/31/2017    Blanco Osborne    MRN: [id number]           Patient Information     Date Of Birth          1965        Visit Information        Provider Department      10/31/2017 11:00 AM Juan Simms MD M [facility] Hepatology        Today's Diagnoses     Liver replaced by transplant (H)    -  1       Follow-ups after your visit        Follow-up notes from your care team     Return in about 6 months (around 4/30/2018).      Your next 10 appointments already scheduled     Nov 06, 2017  1:00 PM CST   Return Weight Management Visit with Citlali West RD   [facility] ([facility])    [facility]  [address]  [facility] [address]   [phone]            Nov 20, 2017  1:00 PM CST   Return Weight Management Visit with Citlali West RD   [facility] ([facility])    [facility]  [address]  [facility] [address]   [phone]            Dec 04, 2017  1:00 PM CST   Return Weight Management Visit with Citlali West RD   [facility] ([facility])    [facility]  [address]  [facility] [address]   [phone]            May 02, 2018 11:00 AM CDT   Lab with  LAB   [facility] ([facility])    [address]  1st Floor  [place] [id number] [phone]            May 02, 2018 12:00 PM CDT   (Arrive by 11:45 AM)   Return General Liver with Juan Simms MD   [facility] Hepatology ([facility])    [address]  3rd Floor  [place] [id number] [phone]              Future tests that were ordered for you today     Open Standing Orders        Priority Remaining Interval Expires Ordered    CBC with platelets Routine 5/6 Every 2 Months 10/31/2018 10/30/2017    Basic metabolic panel Routine 5/6 Every 2 Months 10/31/2018 10/30/2017    Hepatic panel Routine 5/6 Every 2 Months  10/31/2018 10/30/2017    Tacrolimus level Routine 5/6 Every 2 Months 10/31/2018 10/30/2017            Who to contact     If you have questions or need follow up information about today's clinic visit or your schedule please contact [facility] HEPATOLOG[facility] directly at [phone].  Normal or non-critical lab and imaging results will be communicated to you by MyChart, letter or phone within 4 business days after the clinic has received the results. If you do not hear from us within 7 days, please contact the clinic through Ivaldihart or phone. If you have a critical or abnormal lab result, we will notify you by phone as soon as possible.  Submit refill requests through [email or web address] or call your pharmacy and they will forward the refill request to us. Please allow 3 business days for your refill to be completed.          Additional Information About Your Visit        MyChart Information     [email or web address] gives you secure access to your electronic health record. If you see a primary care provider, you can also send messages to your care team and make appointments. If you have questions, please call your primary care clinic.  If you do not have a primary care provider, please call [phone] and they will assist you.        Care EveryWhere ID     This is your Care EveryWhere ID. This could be used by other organizations to access your [place] medical records  [id number]        Your Vitals Were     Pulse Temperature Height BMI (Body Mass Index)          72 98.5  F (36.9  C) (Oral) 1.829 m (6') 40.01 kg/m2         Blood Pressure from Last 3 Encounters:   10/31/17 (!) 176/119   09/19/17 151/89   07/18/17 (!) 171/115    Weight from Last 3 Encounters:   10/31/17 133.8 kg (295 lb)   10/17/17 (!) 136.7 kg (301 lb 4.8 oz)   09/19/17 133.4 kg (294 lb)              Today, you had the following     No orders found for display       Primary Care Provider Office Phone # Fax #    Ki Powers [phone]       [facility]  CLINIC [address] [address]        Equal Access to Services     DIPTIWANDA TILA : Hadii elizabeth elliott hadnicholasfelipe Sowilliali, waaxda luqadaha, qaybta bravozaidamell akbar, enrico rollinsdestinyludmila cross. So [facility] [phone].    ATENCIÓN: Si habla español, tiene a new disposición servicios gratuitos de asistencia lingüística. Llame al [phone].    We comply with applicable federal civil rights laws and [place] laws. We do not discriminate on the basis of race, color, national origin, age, disability, sex, sexual orientation, or gender identity.            Thank you!     Thank you for choosing [facility]  for your care. Our goal is always to provide you with excellent care. Hearing back from our patients is one way we can continue to improve our services. Please take a few minutes to complete the written survey that you may receive in the mail after your visit with us. Thank you!             Your Updated Medication List - Protect others around you: Learn how to safely use, store and throw away your medicines at www.disposemymeds.org.          This list is accurate as of: 10/31/17 12:11 PM.  Always use your most recent med list.                   Brand Name Dispense Instructions for use Diagnosis    acetaminophen 500 MG tablet    TYLENOL     Take 1,000 mg by mouth every 6 hours as needed for mild pain        amLODIPine 5 MG tablet    NORVASC    90 tablet    Take 1 tablet (5 mg) by mouth daily    Benign essential hypertension       aspirin 81 MG tablet     30 tablet    Take 1 tablet (81 mg) by mouth daily    Immunosuppression (H), Liver transplanted (H)       atenolol 50 MG tablet    TENORMIN    180 tablet    Take 1 tablet (50 mg) by mouth daily    Benign essential hypertension       * multivitamin, therapeutic with minerals Tabs tablet      Take 1 tablet by mouth daily        * multivitamin  with lutein Caps per capsule      Take 1 capsule by mouth daily        * order for DME     1  each    Equipment being ordered: Class 2 (30-40mmHg) compression knee high stockings, night time knee high compression alternative, bandaging supplies    Lymphedema of both lower extremities       * order for DME     1 each    Equipment being ordered: Compression knee high stockings for B LE lymphedema 20-30mmHg    Lymphedema of both lower extremities       tacrolimus 1 MG capsule    GENERIC EQUIVALENT    120 capsule    Take 2 capsules (2 mg) by mouth every 12 hours    Liver transplanted (H)       Vitamin D3 5000 UNITS Tbdp      Reported on 4/25/2017        * Notice:  This list has 4 medication(s) that are the same as other medications prescribed for you. Read the directions carefully, and ask your doctor or other care provider to review them with you.

## 2017-10-31 NOTE — NURSING NOTE
Blanco Osborne      1.  Has the patient received the information for the influenza vaccine? YES    2.  Does the patient have any of the following contraindications?     Allergy to eggs? No     Allergic reaction to previous influenza vaccines? No     Any other problems to previous influenza vaccines? No     Paralyzed by Guillain-[place] syndrome? No     Currently pregnant? NO     Current moderate or severe illness? No     Allergy to contact lens solution? No    3.  The vaccine has been administered in the usual fashion and the patient was instructed to wait 20 minutes before leaving the building in the event of an allergic reaction: YES    Vaccination given by radha hutson.  Recorded by Ledy Rodriguez      .

## 2017-10-31 NOTE — PROGRESS NOTES
SUBJECTIVE:   Blanco Osborne is a 52 year old year old male here for followup. He is now 15 months s/p liver transplant for cirrhosis 2/2 CARRILLO. He still endorses mild incisional pain but states it is steadily improving and is tolerable. He denies any nausea, vomiting, constipation, diarrhea, change in stool color, change in urine. He denies SOB or CP. He is trying to lose weight - he is part of a program with the sports medicine department to work on exercise and diet.     He does endorse moderate bilateral foot pain that occurs when walking on the bottom of his foot. He describes it as a soreness/numbness. He has not tried anything for it and has not seen anyone for it. It has been happening for about 2-3 months.     Review of systems:  10 point ROS negative except as noted above.    Past Medical History:   Diagnosis Date     Ascites      Cirrhosis of liver with ascites (H) 2/11/2016     H/O alcohol abuse      HTN (hypertension)      NAFLD (nonalcoholic fatty liver disease) 2/11/2016     CHRISTIAN on CPAP      SBP (spontaneous bacterial peritonitis) (H)     [place]       Current Outpatient Prescriptions on File Prior to Visit:  tacrolimus (GENERIC EQUIVALENT) 1 MG capsule Take 2 capsules (2 mg) by mouth every 12 hours   atenolol (TENORMIN) 50 MG tablet Take 1 tablet (50 mg) by mouth daily   multivitamin  with lutein (OCUVITE WITH LTEIN) CAPS per capsule Take 1 capsule by mouth daily   Cholecalciferol (VITAMIN D3) 5000 UNITS TBDP Reported on 4/25/2017   order for DME Equipment being ordered: Compression knee high stockings for B LE lymphedema 20-30mmHg   amLODIPine (NORVASC) 5 MG tablet Take 1 tablet (5 mg) by mouth daily   aspirin 81 MG tablet Take 1 tablet (81 mg) by mouth daily   order for DME Equipment being ordered: Class 2 (30-40mmHg) compression knee high stockings, night time knee high compression alternative, bandaging supplies   multivitamin, therapeutic with minerals (THERA-VIT-M) TABS Take 1 tablet by mouth  daily   acetaminophen (TYLENOL) 500 MG tablet Take 1,000 mg by mouth every 6 hours as needed for mild pain     No current facility-administered medications on file prior to visit.      OBJECTIVE:  Physical exam:  BP (!) 176/119  Pulse 72  Temp 98.5  F (36.9  C) (Oral)  Ht 1.829 m (6')  Wt 133.8 kg (295 lb)  BMI 40.01 kg/m2    Gen: NAD, alert, pleasant, cooperative, non-toxic, no jaundice  HEENT: EOMI, no scleral icterus, tracking appropriately, TESSA  Resp: CTAB, no crackles or wheezes, no increased WOB  Cardiac: RRR, no S3/S4, no M/R/G appreciated  GI: soft, non-distended, obese, large well healing semi-circular incision present on upper half of abdomen with mild tenderness to light palpation.  Ext: WWP, spontaneous movement in all 4  Neuro: AOx3, CN 2-12 grossly intact, appropriate mentation    ASSESSMENT and PLAN:   Blanco was seen today for recheck.    Diagnoses and all orders for this visit:    Liver replaced by transplant (H)  -     influenza Vac Split High-Dose (FLUZONE) injection 0.5 mL; Inject 0.5 mLs into the muscle once         -    Hepatic labs every other month    #Hypertension  Pt states BP is 130/80s at home consistently. Likely a component of weight and exertion coming into clinic combined with white coat hypertension. Will continue to monitor at follow up appointments.     #Foot pain  Likely plantar fascitis. Discussed some stretches he can do, as well as instructed him to follow up with PCP if does not improve.     Return to clinic in 6 months    Patient seen and discussed with Dr. Simms who agrees with this plan.    Kacey Dorsey MD  Internal Medicine, PGY 1    The patient was seen and examined.  The above assessment and plan was developed jointly with the resident.      Juan Simms MD      Professor of Medicine  [employer]      Executive Medical Director, Solid Organ Transplant Program  [facility]

## 2017-11-10 LAB
ETHYL GLUCURONIDE UR QL: NORMAL
ETHYL GLUCURONIDE UR-MCNC: 684 NG/ML
ETHYL SULFATE UR-MCNC: NEGATIVE NG/ML

## 2017-11-21 DIAGNOSIS — K70.31 ALCOHOLIC CIRRHOSIS OF LIVER WITH ASCITES (H): Primary | ICD-10-CM

## 2017-11-21 RX ORDER — ALBUMIN (HUMAN) 12.5 G/50ML
12.5 SOLUTION INTRAVENOUS 4 TIMES DAILY PRN
Status: CANCELLED
Start: 2017-11-21

## 2017-11-30 ENCOUNTER — TELEPHONE (OUTPATIENT)
Dept: TRANSPLANT | Facility: CLINIC | Age: 52
End: 2017-11-30

## 2017-11-30 NOTE — TELEPHONE ENCOUNTER
[facility] Prior Authorization Team   Phone: [phone]  Fax: [phone]    PA Initiation    Medication: tacrolimus 1 MG capsule  Insurance Company: [employer] - Phone [phone] Fax [phone]  Pharm[facility]illing the Rx[facility] - [place], [place] - [address]  Filling Pharmacy Phone: [phone]  Filling Pharmacy Fax: [phone]  Start Date: 11/30/2017

## 2017-12-01 NOTE — TELEPHONE ENCOUNTER
[facility] Prior Authorization Team   Phone: [phone]  Fax: [phone]  Prior Authorization Approval    Authorization Effective Date: 11/29/2017  Authorization Expiration Date: 12/31/2099  Medication: tacrolimus 1 MG capsule  Approved Dose/Quantity: Take 2 capsules (2 mg) by mouth every 12 hours / #120  Reference #: CMM KEY#: [id number]   Insurance Company: [employer] - Phone [phone] Fax [phone]  Expected CoPay: $1.20     CoPay Card Available:      Foundation Assistance Needed:    Which Pharmacy is filling the prescription (Not needed for infusion/clinic administered): [facility] - [place], [place] - [facility] [address]  Pharmacy Notified: Yes  Patient Notified: Yes

## 2018-01-23 DIAGNOSIS — I10 BENIGN ESSENTIAL HYPERTENSION: ICD-10-CM

## 2018-01-24 RX ORDER — AMLODIPINE BESYLATE 5 MG/1
5 TABLET ORAL DAILY
Qty: 90 TABLET | Refills: 3 | Status: SHIPPED | OUTPATIENT
Start: 2018-01-24 | End: 2018-03-05

## 2018-03-05 ENCOUNTER — TELEPHONE (OUTPATIENT)
Dept: TRANSPLANT | Facility: CLINIC | Age: 53
End: 2018-03-05

## 2018-03-05 DIAGNOSIS — Z94.4 LIVER TRANSPLANTED (H): ICD-10-CM

## 2018-03-05 DIAGNOSIS — I10 BENIGN ESSENTIAL HYPERTENSION: ICD-10-CM

## 2018-03-05 RX ORDER — ATENOLOL 50 MG/1
50 TABLET ORAL DAILY
Qty: 90 TABLET | Refills: 3 | Status: SHIPPED | OUTPATIENT
Start: 2018-03-05 | End: 2018-11-30

## 2018-03-05 RX ORDER — AMLODIPINE BESYLATE 5 MG/1
5 TABLET ORAL DAILY
Qty: 90 TABLET | Refills: 3 | Status: SHIPPED | OUTPATIENT
Start: 2018-03-05 | End: 2018-11-30

## 2018-03-05 RX ORDER — TACROLIMUS 1 MG/1
2 CAPSULE ORAL EVERY 12 HOURS
Qty: 360 CAPSULE | Refills: 3 | Status: SHIPPED | OUTPATIENT
Start: 2018-03-05 | End: 2018-07-11

## 2018-03-05 NOTE — TELEPHONE ENCOUNTER
Pt now has to use [employer] and they require 90 supply of meds but can't transfer the scripts from  pharmacy.

## 2018-03-05 NOTE — TELEPHONE ENCOUNTER
Patient Call: Medication Refill  Instruct the patient to first contact their pharmacy. If they have called their pharmacy and require further assistance, route to LPN.  Pharmacy Name: [facility], Phone# [phone]  Pharmacy Location:  [address], [place]  Name of Medication: Amlodipine, Besylate 5mg  When will the patient be out of this medication? yes

## 2018-03-05 NOTE — TELEPHONE ENCOUNTER
Patient Call: Medication Refill  Instruct the patient to first contact their pharmacy. If they have called their pharmacy and require further assistance, route to LPN.  Pharmacy Name: [facility]- New Pharmacy and new Insurance  Pharmacy Location:   Name of Medication: Pt DM on VM- stated out of meds and has been trying to get these filled for days- Needs authorization- Call [facility] at [phone] -hey have file open-   When will the patient be out of this medication? Per VM is out of some meds- did not say which ones

## 2018-04-06 ENCOUNTER — DOCUMENTATION ONLY (OUTPATIENT)
Dept: TRANSPLANT | Facility: CLINIC | Age: 53
End: 2018-04-06

## 2018-07-10 ENCOUNTER — OFFICE VISIT (OUTPATIENT)
Dept: GASTROENTEROLOGY | Facility: CLINIC | Age: 53
End: 2018-07-10
Attending: INTERNAL MEDICINE
Payer: COMMERCIAL

## 2018-07-10 VITALS
TEMPERATURE: 98.9 F | HEART RATE: 60 BPM | OXYGEN SATURATION: 97 % | BODY MASS INDEX: 41.07 KG/M2 | HEIGHT: 72 IN | DIASTOLIC BLOOD PRESSURE: 93 MMHG | SYSTOLIC BLOOD PRESSURE: 152 MMHG | WEIGHT: 303.2 LBS

## 2018-07-10 DIAGNOSIS — E66.01 MORBID OBESITY (H): ICD-10-CM

## 2018-07-10 DIAGNOSIS — Z94.4 LIVER REPLACED BY TRANSPLANT (H): ICD-10-CM

## 2018-07-10 DIAGNOSIS — Z94.4 LIVER TRANSPLANTED (H): ICD-10-CM

## 2018-07-10 DIAGNOSIS — Z94.4 LIVER REPLACED BY TRANSPLANT (H): Primary | ICD-10-CM

## 2018-07-10 LAB
ALBUMIN SERPL-MCNC: 3.5 G/DL (ref 3.4–5)
ALP SERPL-CCNC: 110 U/L (ref 40–150)
ALT SERPL W P-5'-P-CCNC: 37 U/L (ref 0–70)
ANION GAP SERPL CALCULATED.3IONS-SCNC: 12 MMOL/L (ref 3–14)
AST SERPL W P-5'-P-CCNC: 40 U/L (ref 0–45)
BILIRUB DIRECT SERPL-MCNC: 0.3 MG/DL (ref 0–0.2)
BILIRUB SERPL-MCNC: 1.1 MG/DL (ref 0.2–1.3)
BUN SERPL-MCNC: 25 MG/DL (ref 7–30)
CALCIUM SERPL-MCNC: 8.7 MG/DL (ref 8.5–10.1)
CHLORIDE SERPL-SCNC: 104 MMOL/L (ref 94–109)
CO2 SERPL-SCNC: 23 MMOL/L (ref 20–32)
CREAT SERPL-MCNC: 1.32 MG/DL (ref 0.66–1.25)
ERYTHROCYTE [DISTWIDTH] IN BLOOD BY AUTOMATED COUNT: 13.2 % (ref 10–15)
GFR SERPL CREATININE-BSD FRML MDRD: 57 ML/MIN/1.7M2
GLUCOSE SERPL-MCNC: 89 MG/DL (ref 70–99)
HCT VFR BLD AUTO: 36.5 % (ref 40–53)
HGB BLD-MCNC: 12.7 G/DL (ref 13.3–17.7)
MCH RBC QN AUTO: 37 PG (ref 26.5–33)
MCHC RBC AUTO-ENTMCNC: 34.8 G/DL (ref 31.5–36.5)
MCV RBC AUTO: 106 FL (ref 78–100)
PLATELET # BLD AUTO: 115 10E9/L (ref 150–450)
POTASSIUM SERPL-SCNC: 4.2 MMOL/L (ref 3.4–5.3)
PROT SERPL-MCNC: 7 G/DL (ref 6.8–8.8)
RBC # BLD AUTO: 3.43 10E12/L (ref 4.4–5.9)
SODIUM SERPL-SCNC: 138 MMOL/L (ref 133–144)
TACROLIMUS BLD-MCNC: 8.5 UG/L (ref 5–15)
TME LAST DOSE: NORMAL H
WBC # BLD AUTO: 3.8 10E9/L (ref 4–11)

## 2018-07-10 PROCEDURE — G0009 ADMIN PNEUMOCOCCAL VACCINE: HCPCS | Mod: ZF

## 2018-07-10 PROCEDURE — 80048 BASIC METABOLIC PNL TOTAL CA: CPT | Performed by: INTERNAL MEDICINE

## 2018-07-10 PROCEDURE — 80321 ALCOHOLS BIOMARKERS 1OR 2: CPT | Performed by: INTERNAL MEDICINE

## 2018-07-10 PROCEDURE — 80076 HEPATIC FUNCTION PANEL: CPT | Performed by: INTERNAL MEDICINE

## 2018-07-10 PROCEDURE — 36415 COLL VENOUS BLD VENIPUNCTURE: CPT | Performed by: INTERNAL MEDICINE

## 2018-07-10 PROCEDURE — G0463 HOSPITAL OUTPT CLINIC VISIT: HCPCS | Mod: 25,ZF

## 2018-07-10 PROCEDURE — 85027 COMPLETE CBC AUTOMATED: CPT | Performed by: INTERNAL MEDICINE

## 2018-07-10 PROCEDURE — 80197 ASSAY OF TACROLIMUS: CPT | Performed by: INTERNAL MEDICINE

## 2018-07-10 PROCEDURE — 25000128 H RX IP 250 OP 636: Mod: ZF | Performed by: INTERNAL MEDICINE

## 2018-07-10 PROCEDURE — 90670 PCV13 VACCINE IM: CPT | Mod: ZF | Performed by: INTERNAL MEDICINE

## 2018-07-10 RX ADMIN — PNEUMOCOCCAL 13-VALENT CONJUGATE VACCINE 0.5 ML: 2.2; 2.2; 2.2; 2.2; 2.2; 4.4; 2.2; 2.2; 2.2; 2.2; 2.2; 2.2; 2.2 INJECTION, SUSPENSION INTRAMUSCULAR at 09:13

## 2018-07-10 ASSESSMENT — PAIN SCALES - GENERAL: PAINLEVEL: MILD PAIN (3)

## 2018-07-10 NOTE — MR AVS SNAPSHOT
After Visit Summary   7/10/2018    Blanco Osborne    MRN: [id number]           Patient Information     Date Of Birth          1965        Visit Information        Provider Department      7/10/2018 8:45 AM Juan Simms MD [facility] Hepatology        Today's Diagnoses     Liver replaced by transplant (H)    -  1    Morbid obesity (H)           Follow-ups after your visit        Additional Services     WEIGHT MANAGEMENT/ P LIFESTYLE PROGRAM REFERRAL       To schedule an appointment, please call [facility] at [phone].                  Follow-up notes from your care team     Return in about 6 months (around 1/10/2019).      Your next 10 appointments already scheduled     Jan 14, 2019  1:30 PM CST   Lab with  LAB   [facility] ([facility])    [address]  1st Floor  [facility] [address] [phone]            Jan 14, 2019  2:30 PM CST   (Arrive by 2:15 PM)   Return General Liver with Juan Simms MD   [facility] Hepatology ([facility])    [address]  [address]  [place] [address] [phone]              Who to contact     If you have questions or need follow up information about today's clinic visit or your schedule please contact [facility] HEPATOLOG[facility] directly at [phone].  Normal or non-critical lab and imaging results will be communicated to you by MyChart, letter or phone within 4 business days after the clinic has received the results. If you do not hear from us within 7 days, please contact the clinic through MyChart or phone. If you have a critical or abnormal lab result, we will notify you by phone as soon as possible.  Submit refill requests through GuardiCore or call your pharmacy and they will forward the refill request to us. Please allow 3 business days for your refill to be completed.          Additional Information About Your Visit        WurldtechharLeadPoint Information     Gu[employer]Core gives you secure  access to your electronic health record. If you see a primary care provider, you can also send messages to your care team and make appointments. If you have questions, please call your primary care clinic.  If you do not have a primary care provider, please call [phone] and they will assist you.        Care EveryWhere ID     This is your Care EveryWhere ID. This could be used by other organizations to access your [place] medical records  [id number]        Your Vitals Were     Pulse Temperature Height Pulse Oximetry BMI (Body Mass Index)       60 98.9  F (37.2  C) (Oral) 1.829 m (6') 97% 41.12 kg/m2        Blood Pressure from Last 3 Encounters:   07/10/18 (!) 152/93   10/31/17 (!) 176/119   09/19/17 151/89    Weight from Last 3 Encounters:   07/10/18 137.5 kg (303 lb 3.2 oz)   10/31/17 133.8 kg (295 lb)   10/17/17 (!) 136.7 kg (301 lb 4.8 oz)              We Performed the Following     WEIGHT MANAGEMENT/ [facility] LIFESTYLE PROGRAM REFERRAL          Today's Medication Changes          These changes are accurate as of 7/10/18 11:59 PM.  If you have any questions, ask your nurse or doctor.               These medicines have changed or have updated prescriptions.        Dose/Directions    tacrolimus 1 MG capsule   Commonly known as:  GENERIC EQUIVALENT   This may have changed:    - how much to take  - how to take this  - when to take this  - additional instructions   Used for:  Liver transplanted (H)   Changed by:  Jerrica Almaraz, RN        Take 1 mg in the AM, and 2 mg in the PM. 12 hours apart   Quantity:  270 capsule   Refills:  3            Where to get your medicines      These medications were sent to [facility] - [place], [place] - [address]  [address] [address]     Phone:  [phone]     tacrolimus 1 MG capsule                Primary Care Provider Office Phone # Fax #    Ki Pwoers [phone]       [facility] [address]  [facility] [address]        Equal Access to Services     [facility] Center[facility]YOTHI : Hadii elizabeth elliott amarilis Bloomali, waaxda luqadaha, qaybta kaalmada enrico akbar. So [facility] [phone].    ATENCIÓN: Si habla español, tiene a new disposición servicios gratuitos de asistencia lingüística. Chang al [phone].    We comply with applicable federal civil rights laws and [place] laws. We do not discriminate on the basis of race, color, national origin, age, disability, sex, sexual orientation, or gender identity.            Thank you!     Thank you for choosing [facility]PAT[facility]  for your care. Our goal is always to provide you with excellent care. Hearing back from our patients is one way we can continue to improve our services. Please take a few minutes to complete the written survey that you may receive in the mail after your visit with us. Thank you!             Your Updated Medication List - Protect others around you: Learn how to safely use, store and throw away your medicines at www.disposemymeds.org.          This list is accurate as of 7/10/18 11:59 PM.  Always use your most recent med list.                   Brand Name Dispense Instructions for use Diagnosis    acetaminophen 500 MG tablet    TYLENOL     Take 1,000 mg by mouth every 6 hours as needed for mild pain        amLODIPine 5 MG tablet    NORVASC    90 tablet    Take 1 tablet (5 mg) by mouth daily    Benign essential hypertension       aspirin 81 MG tablet     30 tablet    Take 1 tablet (81 mg) by mouth daily    Immunosuppression (H), Liver transplanted (H)       atenolol 50 MG tablet    TENORMIN    90 tablet    Take 1 tablet (50 mg) by mouth daily    Benign essential hypertension       * multivitamin, therapeutic with minerals Tabs tablet      Take 1 tablet by mouth daily        * multivitamin  with lutein Caps per capsule      Take 1 capsule by mouth daily        * order for DME     1 each    Equipment being ordered:  Class 2 (30-40mmHg) compression knee high stockings, night time knee high compression alternative, bandaging supplies    Lymphedema of both lower extremities       * order for DME     1 each    Equipment being ordered: Compression knee high stockings for B LE lymphedema 20-30mmHg    Lymphedema of both lower extremities       tacrolimus 1 MG capsule    GENERIC EQUIVALENT    270 capsule    Take 1 mg in the AM, and 2 mg in the PM. 12 hours apart    Liver transplanted (H)       Vitamin D3 5000 units Tbdp      Reported on 4/25/2017        * Notice:  This list has 4 medication(s) that are the same as other medications prescribed for you. Read the directions carefully, and ask your doctor or other care provider to review them with you.

## 2018-07-10 NOTE — NURSING NOTE
Chief Complaint   Patient presents with     RECHECK     liver tx      BP (!) 152/93  Pulse 60  Temp 98.9  F (37.2  C) (Oral)  Ht 1.829 m (6')  Wt 137.5 kg (303 lb 3.2 oz)  SpO2 97%  BMI 41.12 kg/m2  Yarelis Lawton, CMA

## 2018-07-10 NOTE — PROGRESS NOTES
HISTORY OF PRESENT ILLNESS:  I had the pleasure of seeing Blanco Osborne for followup in the Liver Transplant Clinic at the [facility] on 07/10/2018.  Mr. Osborne returns almost 2 years status post liver transplantation for alcoholic cirrhosis.      For the most part he is unchanged.  He still is struggling with his weight and has not lost any weight.  When I saw him last, he was going to enter a sports medicine program.  He did that partially in that he did have some pain in his feet which did improve but he never followed through on a diet and exercise program.      At present, he denies any abdominal pain, itching or skin rash and has mild to moderate fatigue.  He denies any increased abdominal girth or lower extremity edema.  He denies any fevers or chills, cough or shortness of breath.  He denies any nausea or vomiting, diarrhea or constipation.  His appetite has been good, and as I said his weight is unchanged and he really does need to lose at least 50 pounds.     Current Outpatient Prescriptions   Medication     amLODIPine (NORVASC) 5 MG tablet     aspirin 81 MG tablet     atenolol (TENORMIN) 50 MG tablet     Cholecalciferol (VITAMIN D3) 5000 UNITS TBDP     multivitamin  with lutein (OCUVITE WITH LTEIN) CAPS per capsule     tacrolimus (GENERIC EQUIVALENT) 1 MG capsule     acetaminophen (TYLENOL) 500 MG tablet     multivitamin, therapeutic with minerals (THERA-VIT-M) TABS     order for DME     order for DME     Current Facility-Administered Medications   Medication     pneumococcal (PREVNAR 13) injection 0.5 mL     B/P: 152/93, T: 98.9, P: 60, R: Data Unavailable    In general he does appear healthy, but overweight.  HEENT exam shows no scleral icterus or temporal muscle wasting.  His chest is clear.  His abdominal exam shows no increase in girth.  No masses or tenderness to palpation are present.  His liver is 10 cm in span without left lobe enlargement.  No spleen tip is  palpable, and extremity exam shows no edema.  Skin exam shows no stigmata of chronic liver disease.  Neurologic exam shows no asterixis.     Recent Results (from the past 168 hour(s))   CBC with platelets    Collection Time: 07/10/18  7:20 AM   Result Value Ref Range    WBC 3.8 (L) 4.0 - 11.0 10e9/L    RBC Count 3.43 (L) 4.4 - 5.9 10e12/L    Hemoglobin 12.7 (L) 13.3 - 17.7 g/dL    Hematocrit 36.5 (L) 40.0 - 53.0 %     (H) 78 - 100 fl    MCH 37.0 (H) 26.5 - 33.0 pg    MCHC 34.8 31.5 - 36.5 g/dL    RDW 13.2 10.0 - 15.0 %    Platelet Count 115 (L) 150 - 450 10e9/L   Basic metabolic panel    Collection Time: 07/10/18  7:20 AM   Result Value Ref Range    Sodium 138 133 - 144 mmol/L    Potassium 4.2 3.4 - 5.3 mmol/L    Chloride 104 94 - 109 mmol/L    Carbon Dioxide 23 20 - 32 mmol/L    Anion Gap 12 3 - 14 mmol/L    Glucose 89 70 - 99 mg/dL    Urea Nitrogen 25 7 - 30 mg/dL    Creatinine 1.32 (H) 0.66 - 1.25 mg/dL    GFR Estimate 57 (L) >60 mL/min/1.7m2    GFR Estimate If Black 68 >60 mL/min/1.7m2    Calcium 8.7 8.5 - 10.1 mg/dL   Hepatic panel    Collection Time: 07/10/18  7:20 AM   Result Value Ref Range    Bilirubin Direct 0.3 (H) 0.0 - 0.2 mg/dL    Bilirubin Total 1.1 0.2 - 1.3 mg/dL    Albumin 3.5 3.4 - 5.0 g/dL    Protein Total 7.0 6.8 - 8.8 g/dL    Alkaline Phosphatase 110 40 - 150 U/L    ALT 37 0 - 70 U/L    AST 40 0 - 45 U/L   Tacrolimus level    Collection Time: 07/10/18  7:20 AM   Result Value Ref Range    Tacrolimus Last Dose 1900 07/09/2018     Tacrolimus Level 8.5 5.0 - 15.0 ug/L   Ethyl Glucuronide Urine    Collection Time: 07/10/18  8:17 AM   Result Value Ref Range    Ethyl Glucuronide Urine NEGATIVE not reported      My impression is that Mr. Osborne is almost 2 years status post liver transplantation for alcoholic cirrhosis.  He did have a very borderline ethyl glucuronide test last visit.  He denies any alcohol use and the one from today is pending.  I am a little bit alarmed in that his MCV is  high.  It also could account for why he has had difficulty losing weight.  We will wait until that result returns.      He will get a [id number] vaccine and that will update his vaccination schedule and is otherwise up-to-date with regard to cancer screening.  I have encouraged him to get blood work done every 2 months.  I made a referral for him to the lifestyle management program at the [facility].      Thank you very much for allowing me to participate in the care of this patient.  If you have any questions regarding my recommendations, please do not hesitate to contact me.       Juan Simms MD      Professor of Medicine  [employer]      Executive Medical Director, Solid Organ Transplant Program  [facility]

## 2018-07-10 NOTE — TELEPHONE ENCOUNTER
Tacrolimus level 8.5.    Please instruct patient to decrease tacrolimus dose to 1 mg in the morning and 2 mg in the evening.

## 2018-07-10 NOTE — LETTER
7/10/2018      RE: Blanco Osborne  [address]  [facility] [address]       HISTORY OF PRESENT ILLNESS:  I had the pleasure of seeing Blanco Osborne for followup in the Liver Transplant Clinic at the [facility] on 07/10/2018.  Mr. Osborne returns almost 2 years status post liver transplantation for alcoholic cirrhosis.      For the most part he is unchanged.  He still is struggling with his weight and has not lost any weight.  When I saw him last, he was going to enter a sports medicine program.  He did that partially in that he did have some pain in his feet which did improve but he never followed through on a diet and exercise program.      At present, he denies any abdominal pain, itching or skin rash and has mild to moderate fatigue.  He denies any increased abdominal girth or lower extremity edema.  He denies any fevers or chills, cough or shortness of breath.  He denies any nausea or vomiting, diarrhea or constipation.  His appetite has been good, and as I said his weight is unchanged and he really does need to lose at least 50 pounds.     Current Outpatient Prescriptions   Medication     amLODIPine (NORVASC) 5 MG tablet     aspirin 81 MG tablet     atenolol (TENORMIN) 50 MG tablet     Cholecalciferol (VITAMIN D3) 5000 UNITS TBDP     multivitamin  with lutein (OCUVITE WITH LTEIN) CAPS per capsule     tacrolimus (GENERIC EQUIVALENT) 1 MG capsule     acetaminophen (TYLENOL) 500 MG tablet     multivitamin, therapeutic with minerals (THERA-VIT-M) TABS     order for DME     order for DME     Current Facility-Administered Medications   Medication     pneumococcal (PREVNAR 13) injection 0.5 mL     B/P: 152/93, T: 98.9, P: 60, R: Data Unavailable    In general he does appear healthy, but overweight.  HEENT exam shows no scleral icterus or temporal muscle wasting.  His chest is clear.  His abdominal exam shows no increase in girth.  No masses or tenderness to palpation are  present.  His liver is 10 cm in span without left lobe enlargement.  No spleen tip is palpable, and extremity exam shows no edema.  Skin exam shows no stigmata of chronic liver disease.  Neurologic exam shows no asterixis.     Recent Results (from the past 168 hour(s))   CBC with platelets    Collection Time: 07/10/18  7:20 AM   Result Value Ref Range    WBC 3.8 (L) 4.0 - 11.0 10e9/L    RBC Count 3.43 (L) 4.4 - 5.9 10e12/L    Hemoglobin 12.7 (L) 13.3 - 17.7 g/dL    Hematocrit 36.5 (L) 40.0 - 53.0 %     (H) 78 - 100 fl    MCH 37.0 (H) 26.5 - 33.0 pg    MCHC 34.8 31.5 - 36.5 g/dL    RDW 13.2 10.0 - 15.0 %    Platelet Count 115 (L) 150 - 450 10e9/L   Basic metabolic panel    Collection Time: 07/10/18  7:20 AM   Result Value Ref Range    Sodium 138 133 - 144 mmol/L    Potassium 4.2 3.4 - 5.3 mmol/L    Chloride 104 94 - 109 mmol/L    Carbon Dioxide 23 20 - 32 mmol/L    Anion Gap 12 3 - 14 mmol/L    Glucose 89 70 - 99 mg/dL    Urea Nitrogen 25 7 - 30 mg/dL    Creatinine 1.32 (H) 0.66 - 1.25 mg/dL    GFR Estimate 57 (L) >60 mL/min/1.7m2    GFR Estimate If Black 68 >60 mL/min/1.7m2    Calcium 8.7 8.5 - 10.1 mg/dL   Hepatic panel    Collection Time: 07/10/18  7:20 AM   Result Value Ref Range    Bilirubin Direct 0.3 (H) 0.0 - 0.2 mg/dL    Bilirubin Total 1.1 0.2 - 1.3 mg/dL    Albumin 3.5 3.4 - 5.0 g/dL    Protein Total 7.0 6.8 - 8.8 g/dL    Alkaline Phosphatase 110 40 - 150 U/L    ALT 37 0 - 70 U/L    AST 40 0 - 45 U/L   Tacrolimus level    Collection Time: 07/10/18  7:20 AM   Result Value Ref Range    Tacrolimus Last Dose 1900 07/09/2018     Tacrolimus Level 8.5 5.0 - 15.0 ug/L   Ethyl Glucuronide Urine    Collection Time: 07/10/18  8:17 AM   Result Value Ref Range    Ethyl Glucuronide Urine NEGATIVE not reported      My impression is that Mr. Osborne is almost 2 years status post liver transplantation for alcoholic cirrhosis.  He did have a very borderline ethyl glucuronide test last visit.  He denies any alcohol  use and the one from today is pending.  I am a little bit alarmed in that his MCV is high.  It also could account for why he has had difficulty losing weight.  We will wait until that result returns.      He will get a [id number] vaccine and that will update his vaccination schedule and is otherwise up-to-date with regard to cancer screening.  I have encouraged him to get blood work done every 2 months.  I made a referral for him to the lifestyle management program at the [facility].      Thank you very much for allowing me to participate in the care of this patient.  If you have any questions regarding my recommendations, please do not hesitate to contact me.       Juan Simms MD      Professor of Medicine  [employer]      Executive Medical Director, Solid Organ Transplant Program  [facility]    Juan Simms MD

## 2018-07-11 LAB — ETHYL GLUCURONIDE UR QL: NEGATIVE

## 2018-07-11 RX ORDER — TACROLIMUS 1 MG/1
CAPSULE ORAL
Qty: 270 CAPSULE | Refills: 3 | Status: SHIPPED | OUTPATIENT
Start: 2018-07-11 | End: 2019-05-09

## 2018-07-11 NOTE — TELEPHONE ENCOUNTER
Left message to change dose to 1 mg in the AM, and 2 mg in the PM. Advised him to call us back an verify this change

## 2018-07-12 ENCOUNTER — TELEPHONE (OUTPATIENT)
Dept: GASTROENTEROLOGY | Facility: CLINIC | Age: 53
End: 2018-07-12

## 2018-07-12 DIAGNOSIS — Z94.4 LIVER REPLACED BY TRANSPLANT (H): Primary | ICD-10-CM

## 2018-07-12 NOTE — TELEPHONE ENCOUNTER
[facility] Call Center    Phone Message    May a detailed message be left on voicemail: yes    Reason for Call: Order(s): Other:   Reason for requested: Per pt, requesting lab orders to be sent to [facility] in [place] because it's closer to his home.  Date needed: Next lab is due in 2 months (9/10/2018)  Provider name: Dr. Simms      Action Taken: Message routed to:  [facility] ([facility] Hep

## 2018-07-12 NOTE — LETTER
OUTPATIENT OR TRANSITIONAL CARE  LABORATORY TEST ORDER    Patient Name: Blanco Osborne  Transplant Date: 9/20/2016   YOB: 1965  Issue Date: 07/13/18   [facility] MR#: [id number]  Expiration Date: 07/13/19       Diagnoses: [x]      Liver Transplant (ICD-10 Z94.4)    [x]      Long term use of medications (ICD-10 Z79.899)     Please fax results to [phone]    Frequency: Every 2 to 3 months, and PRN        [x] CBC with Platelets   [x] Basic Metabolic Panel (Sodium, Potassium, Chloride, CO2, Creatinine, Urea Nitrogen, Glucose, Calcium)  [x] Hepatic panel (Albumin, Alk Phos, ALT, AST, Direct and Total Bili)  [x] Tacrolimus drug level - 12 hour trough, please document time of last dose     Other Frequency: annually ONCE around October 2018  [x]      Fasting lipid panel  [x] Random urine protein  [x] Urinalysis with reflex to micro     Other Frequency: Every 3 months, and PRN   [x] Ethyl Glucuronide Urine     If you have questions, please call [phone] or [phone].    .

## 2018-07-13 NOTE — TELEPHONE ENCOUNTER
Left message with Blanco at 9AM to call back and let us know what [facility] exactly he is going to. There is not a [facility] in [place]

## 2018-07-16 ENCOUNTER — TELEPHONE (OUTPATIENT)
Dept: TRANSPLANT | Facility: CLINIC | Age: 53
End: 2018-07-16

## 2018-07-16 NOTE — LETTER
OUTPATIENT OR TRANSITIONAL CARE  LABORATORY TEST ORDER    [facility]:  Fax# [phone]    Patient Name: Blanco Osborne  Transplant Date: 9/20/2016   YOB: 1965  Issue Date: 07/17/18   [facility] MR#: [id number]  Expiration Date: 07/17/19       Diagnoses: [x]      Liver Transplant (ICD-10 Z94.4)    [x]      Long term use of medications (ICD-10 Z79.899)     Please fax results to [phone]    Frequency: Every 2 to 3 months, and PRN        [x] CBC with Platelets   [x] Basic Metabolic Panel (Sodium, Potassium, Chloride, CO2, Creatinine, Urea Nitrogen, Glucose, Calcium)  [x] Hepatic panel (Albumin, Alk Phos, ALT, AST, Direct and Total Bili)  [x] Tacrolimus drug level - 12 hour trough, please document time of last dose     Other Frequency: annually ONCE in October 2018  [x]      Fasting lipid panel  [x] Random urine protein  [x] Urinalysis with reflex to micro    If you have questions, please call [phone] or [phone].    .

## 2018-07-16 NOTE — TELEPHONE ENCOUNTER
Patient Call: Transplant Lab/Orders          I faxed Blanco's lab order to [facility] [place] [address].  Fax# [phone]  Route to LPN  Post Transplant Days: 664  When patient is less than 60 days post-transplant, route high priority    Reason for Call: Missing labs/orders; which labs/orders? [facility] [place] (If patient is at a clinic without orders, Kvng then page LPN)  Callback needed? No

## 2018-08-20 ENCOUNTER — DOCUMENTATION ONLY (OUTPATIENT)
Dept: TRANSPLANT | Facility: CLINIC | Age: 53
End: 2018-08-20

## 2018-08-20 NOTE — PROGRESS NOTES
Annual chart review completed.    Pt is not doing labs at regular advised interval though he had them recently in July.  PLease send him a letter reminding him that he should be doing lab tests every 2-3 mos  Pt has been seen at Transplant Center within the past year.

## 2018-08-24 ENCOUNTER — TELEPHONE (OUTPATIENT)
Dept: TRANSPLANT | Facility: CLINIC | Age: 53
End: 2018-08-24

## 2018-08-24 NOTE — TELEPHONE ENCOUNTER
Pt calling and sending a dental okay to have a broken tooth filled.  The dental office requesting clearance to do the repair.  Completed the form, faxed it to the dental office.  Will send original to pt in the mail.

## 2018-09-18 ENCOUNTER — TELEPHONE (OUTPATIENT)
Dept: PHARMACY | Facility: CLINIC | Age: 53
End: 2018-09-18

## 2018-11-30 DIAGNOSIS — I10 BENIGN ESSENTIAL HYPERTENSION: ICD-10-CM

## 2018-12-03 RX ORDER — AMLODIPINE BESYLATE 5 MG/1
TABLET ORAL
Qty: 90 TABLET | Refills: 3 | Status: SHIPPED | OUTPATIENT
Start: 2018-12-03 | End: 2019-11-14

## 2018-12-03 RX ORDER — ATENOLOL 50 MG/1
TABLET ORAL
Qty: 90 TABLET | Refills: 3 | Status: SHIPPED | OUTPATIENT
Start: 2018-12-03 | End: 2019-11-14

## 2019-02-20 ENCOUNTER — OFFICE VISIT (OUTPATIENT)
Dept: GASTROENTEROLOGY | Facility: CLINIC | Age: 54
End: 2019-02-20
Attending: INTERNAL MEDICINE
Payer: COMMERCIAL

## 2019-02-20 VITALS
HEIGHT: 72 IN | TEMPERATURE: 97.8 F | DIASTOLIC BLOOD PRESSURE: 95 MMHG | OXYGEN SATURATION: 98 % | WEIGHT: 309.6 LBS | BODY MASS INDEX: 41.93 KG/M2 | SYSTOLIC BLOOD PRESSURE: 150 MMHG | HEART RATE: 60 BPM

## 2019-02-20 DIAGNOSIS — Z94.4 LIVER TRANSPLANTED (H): Primary | ICD-10-CM

## 2019-02-20 DIAGNOSIS — Z94.4 LIVER REPLACED BY TRANSPLANT (H): ICD-10-CM

## 2019-02-20 LAB
ALBUMIN SERPL-MCNC: 3.8 G/DL (ref 3.4–5)
ALP SERPL-CCNC: 118 U/L (ref 40–150)
ALT SERPL W P-5'-P-CCNC: 74 U/L (ref 0–70)
ANION GAP SERPL CALCULATED.3IONS-SCNC: 7 MMOL/L (ref 3–14)
AST SERPL W P-5'-P-CCNC: 89 U/L (ref 0–45)
BILIRUB DIRECT SERPL-MCNC: 0.4 MG/DL (ref 0–0.2)
BILIRUB SERPL-MCNC: 1.3 MG/DL (ref 0.2–1.3)
BUN SERPL-MCNC: 20 MG/DL (ref 7–30)
CALCIUM SERPL-MCNC: 8.4 MG/DL (ref 8.5–10.1)
CHLORIDE SERPL-SCNC: 106 MMOL/L (ref 94–109)
CO2 SERPL-SCNC: 24 MMOL/L (ref 20–32)
CREAT SERPL-MCNC: 1.08 MG/DL (ref 0.66–1.25)
ERYTHROCYTE [DISTWIDTH] IN BLOOD BY AUTOMATED COUNT: 13.6 % (ref 10–15)
GFR SERPL CREATININE-BSD FRML MDRD: 77 ML/MIN/{1.73_M2}
GLUCOSE SERPL-MCNC: 95 MG/DL (ref 70–99)
HCT VFR BLD AUTO: 41.3 % (ref 40–53)
HGB BLD-MCNC: 13.7 G/DL (ref 13.3–17.7)
MCH RBC QN AUTO: 37.1 PG (ref 26.5–33)
MCHC RBC AUTO-ENTMCNC: 33.2 G/DL (ref 31.5–36.5)
MCV RBC AUTO: 112 FL (ref 78–100)
PLATELET # BLD AUTO: 125 10E9/L (ref 150–450)
POTASSIUM SERPL-SCNC: 4.2 MMOL/L (ref 3.4–5.3)
PROT SERPL-MCNC: 7.6 G/DL (ref 6.8–8.8)
RBC # BLD AUTO: 3.69 10E12/L (ref 4.4–5.9)
SODIUM SERPL-SCNC: 137 MMOL/L (ref 133–144)
TACROLIMUS BLD-MCNC: 6.3 UG/L (ref 5–15)
TME LAST DOSE: NORMAL H
WBC # BLD AUTO: 3.9 10E9/L (ref 4–11)

## 2019-02-20 PROCEDURE — 36415 COLL VENOUS BLD VENIPUNCTURE: CPT | Performed by: INTERNAL MEDICINE

## 2019-02-20 PROCEDURE — 80197 ASSAY OF TACROLIMUS: CPT | Performed by: INTERNAL MEDICINE

## 2019-02-20 PROCEDURE — 80076 HEPATIC FUNCTION PANEL: CPT | Performed by: INTERNAL MEDICINE

## 2019-02-20 PROCEDURE — 85027 COMPLETE CBC AUTOMATED: CPT | Performed by: INTERNAL MEDICINE

## 2019-02-20 PROCEDURE — G0463 HOSPITAL OUTPT CLINIC VISIT: HCPCS | Mod: ZF

## 2019-02-20 PROCEDURE — 80048 BASIC METABOLIC PNL TOTAL CA: CPT | Performed by: INTERNAL MEDICINE

## 2019-02-20 ASSESSMENT — MIFFLIN-ST. JEOR: SCORE: 2282.34

## 2019-02-20 ASSESSMENT — PAIN SCALES - GENERAL: PAINLEVEL: NO PAIN (0)

## 2019-02-20 NOTE — NURSING NOTE
Chief Complaint   Patient presents with     RECHECK     liver tx     BP (!) 150/95   Pulse 60   Temp 97.8  F (36.6  C) (Oral)   Ht 1.829 m (6')   Wt 140.4 kg (309 lb 9.6 oz)   SpO2 98%   BMI 41.99 kg/m    Blaire Carlin MA

## 2019-02-20 NOTE — LETTER
2/20/2019     RE: Blanco Osborne  [address] [place] [id number]     Dear Colleague,    Thank you for referring your patient, Blanco Osborne, to the [facility]PAT[facility]Y at [facility]. Please see a copy of my visit note below.    [facility] Hepatology Note      Encounter Date: Feb 20, 2019    CC:  Chief Complaint   Patient presents with     RECHECK     liver tx     History of Present Illness:  Mr. Blanco Osborne is a 53 year old male who presents as a follow-up for liver transplant. At today's visit the patient started training with an exercising couch. He started that two weeks ago. During the holiday time he was not exercising and was not eating well. Patient continues to take his tacrolimus 3 mg a day. Patient was wondering if he should be taking Vitamin B and Vitamin D. He has started taking a multivitamin. Patient was wondering if he should go see a dermatologist. Patient is other wise feeling well and has no further areas of concern.     Past Medical History:   Patient Active Problem List   Diagnosis     Cirrhosis of liver with ascites (H)     NAFLD (nonalcoholic fatty liver disease)     Liver transplant candidate     Liver transplanted (H)     Trauma     Immunosuppression (H)     Ascites of liver     Postoperative ileus (H)     Acute kidney injury (H)     Decreased flow of hepatic artery during surgery     Past Medical History:   Diagnosis Date     Ascites      Cirrhosis of liver with ascites (H) 2/11/2016     H/O alcohol abuse      HTN (hypertension)      NAFLD (nonalcoholic fatty liver disease) 2/11/2016     CHRISTIAN on CPAP      SBP (spontaneous bacterial peritonitis) (H)     [facility]     Past Surgical History:   Procedure Laterality Date     APPENDECTOMY       BENCH LIVER N/A 9/20/2016    Procedure: BENCH LIVER;  Surgeon: Enoc Crews MD;  Location: [facility] OR     COLONOSCOPY  8/6/13    repeat in 2018     HERNIA REPAIR        TRANSPLANT LIVER RECIPIENT  DONOR N/A 2016    Procedure: TRANSPLANT LIVER RECIPIENT  DONOR;  Surgeon: Enco Crews MD;  Location: [facility] OR     Medications:  Current Outpatient Medications   Medication Sig Dispense Refill     acetaminophen (TYLENOL) 500 MG tablet Take 1,000 mg by mouth every 6 hours as needed for mild pain       amLODIPine (NORVASC) 5 MG tablet TAKE 1 TABLET EVERY DAY 90 tablet 3     aspirin 81 MG tablet Take 1 tablet (81 mg) by mouth daily 30 tablet 11     atenolol (TENORMIN) 50 MG tablet TAKE 1 TABLET EVERY DAY 90 tablet 3     Cholecalciferol (VITAMIN D3) 5000 UNITS TBDP Reported on 2017       multivitamin  with lutein (OCUVITE WITH LTEIN) CAPS per capsule Take 1 capsule by mouth daily       multivitamin, therapeutic with minerals (THERA-VIT-M) TABS Take 1 tablet by mouth daily       tacrolimus (GENERIC EQUIVALENT) 1 MG capsule Take 1 mg in the AM, and 2 mg in the PM. 12 hours apart 270 capsule 3     order for DME Equipment being ordered: Compression knee high stockings for B LE lymphedema 20-30mmHg (Patient not taking: Reported on 2019) 1 each 0     order for DME Equipment being ordered: Class 2 (30-40mmHg) compression knee high stockings, night time knee high compression alternative, bandaging supplies (Patient not taking: Reported on 2019) 1 each 0       No Known Allergies    BP (!) 150/95   Pulse 60   Temp 97.8  F (36.6  C) (Oral)   Ht 1.829 m (6')   Wt 140.4 kg (309 lb 9.6 oz)   SpO2 98%   BMI 41.99 kg/m       Physical Exam:    In general he appears healthy.  HEENT exam shows no scleral icterus or temporal muscle wasting.  Chest is clear.  Abdominal exam shows no increase in girth.  No masses or tenderness to palpation are present.  His liver is 10 cm in span without left lobe enlargement.  No spleen tip is palpable.  Extremity exam shows no edema.  Skin exam shows no suspicious lesions..  Neurologic exam is non-focal.     Recent Results (from  the past 168 hour(s))   Tacrolimus level    Collection Time: 02/20/19 11:57 AM   Result Value Ref Range    Tacrolimus Last Dose 800pm2/19/2019     Tacrolimus Level 6.3 5.0 - 15.0 ug/L   Hepatic panel    Collection Time: 02/20/19 11:57 AM   Result Value Ref Range    Bilirubin Direct 0.4 (H) 0.0 - 0.2 mg/dL    Bilirubin Total 1.3 0.2 - 1.3 mg/dL    Albumin 3.8 3.4 - 5.0 g/dL    Protein Total 7.6 6.8 - 8.8 g/dL    Alkaline Phosphatase 118 40 - 150 U/L    ALT 74 (H) 0 - 70 U/L    AST 89 (H) 0 - 45 U/L   Basic metabolic panel    Collection Time: 02/20/19 11:57 AM   Result Value Ref Range    Sodium 137 133 - 144 mmol/L    Potassium 4.2 3.4 - 5.3 mmol/L    Chloride 106 94 - 109 mmol/L    Carbon Dioxide 24 20 - 32 mmol/L    Anion Gap 7 3 - 14 mmol/L    Glucose 95 70 - 99 mg/dL    Urea Nitrogen 20 7 - 30 mg/dL    Creatinine 1.08 0.66 - 1.25 mg/dL    GFR Estimate 77 >60 mL/min/[1.73_m2]    GFR Estimate If Black 89 >60 mL/min/[1.73_m2]    Calcium 8.4 (L) 8.5 - 10.1 mg/dL   CBC with platelets    Collection Time: 02/20/19 11:57 AM   Result Value Ref Range    WBC 3.9 (L) 4.0 - 11.0 10e9/L    RBC Count 3.69 (L) 4.4 - 5.9 10e12/L    Hemoglobin 13.7 13.3 - 17.7 g/dL    Hematocrit 41.3 40.0 - 53.0 %     (H) 78 - 100 fl    MCH 37.1 (H) 26.5 - 33.0 pg    MCHC 33.2 31.5 - 36.5 g/dL    RDW 13.6 10.0 - 15.0 %    Platelet Count 125 (L) 150 - 450 10e9/L      Impression/Plan:  1. Status post liver transplantation for alcoholic Cirrhosis    Liver and kidney functions are good, but creatine slightly elevated since last visit.     2. Alcoholic Cirrhosis     Continue to abstain from alcohol      3. Obesity     Continue with , recommended patient to lose weight     4. Skin Care maintenance     Recommended patient to see dermatologist   Recommended use of a broad spectrum sunscreen of SPF 30 ore more on all sun exposed sites daily.  Apply 20 minutes prior to exposure and repeat application every two hours or after sweating or  swimming.  Avoid any intentional indoor or outdoor tanning.       5. Health Care Maintenance     Up to date on Vaccine     Up to date on cancer screening     No further intervention required. Patient to report changes.     Follow-up in 6 months.         Staff Involved:  Staff/Scribe    Scribe Disclosure:   IAlyson, am serving as a scribe to document services personally performed by Dr. Juan Simms, based on data collection and the provider's statements to me.        Juan Simms MD      Professor of Medicine  [employer]      Executive Medical Director, Solid Organ Transplant Program  [facility]

## 2019-02-20 NOTE — LETTER
2019      RE: Blanco Osborne  [address] [place] [id number]       [facility] Hepatology Note      Encounter Date: 2019    CC:  Chief Complaint   Patient presents with     RECHECK     liver tx     History of Present Illness:  Mr. Blanco Osborne is a 53 year old male who presents as a follow-up for liver transplant. At today's visit the patient started training with an exercising couch. He started that two weeks ago. During the holiday time he was not exercising and was not eating well. Patient continues to take his tacrolimus 3 mg a day. Patient was wondering if he should be taking Vitamin B and Vitamin D. He has started taking a multivitamin. Patient was wondering if he should go see a dermatologist. Patient is other wise feeling well and has no further areas of concern.     Past Medical History:   Patient Active Problem List   Diagnosis     Cirrhosis of liver with ascites (H)     NAFLD (nonalcoholic fatty liver disease)     Liver transplant candidate     Liver transplanted (H)     Trauma     Immunosuppression (H)     Ascites of liver     Postoperative ileus (H)     Acute kidney injury (H)     Decreased flow of hepatic artery during surgery     Past Medical History:   Diagnosis Date     Ascites      Cirrhosis of liver with ascites (H) 2016     H/O alcohol abuse      HTN (hypertension)      NAFLD (nonalcoholic fatty liver disease) 2016     CHRISTIAN on CPAP      SBP (spontaneous bacterial peritonitis) (H)     [facility]     Past Surgical History:   Procedure Laterality Date     APPENDECTOMY       BENCH LIVER N/A 2016    Procedure: BENCH LIVER;  Surgeon: Enoc Crews MD;  Location: [facility] OR     COLONOSCOPY  13    repeat in 2018     HERNIA REPAIR       TRANSPLANT LIVER RECIPIENT  DONOR N/A 2016    Procedure: TRANSPLANT LIVER RECIPIENT  DONOR;  Surgeon: Enoc Crews MD;  Location: [facility] OR     Medications:  Current  Outpatient Medications   Medication Sig Dispense Refill     acetaminophen (TYLENOL) 500 MG tablet Take 1,000 mg by mouth every 6 hours as needed for mild pain       amLODIPine (NORVASC) 5 MG tablet TAKE 1 TABLET EVERY DAY 90 tablet 3     aspirin 81 MG tablet Take 1 tablet (81 mg) by mouth daily 30 tablet 11     atenolol (TENORMIN) 50 MG tablet TAKE 1 TABLET EVERY DAY 90 tablet 3     Cholecalciferol (VITAMIN D3) 5000 UNITS TBDP Reported on 4/25/2017       multivitamin  with lutein (OCUVITE WITH LTEIN) CAPS per capsule Take 1 capsule by mouth daily       multivitamin, therapeutic with minerals (THERA-VIT-M) TABS Take 1 tablet by mouth daily       tacrolimus (GENERIC EQUIVALENT) 1 MG capsule Take 1 mg in the AM, and 2 mg in the PM. 12 hours apart 270 capsule 3     order for DME Equipment being ordered: Compression knee high stockings for B LE lymphedema 20-30mmHg (Patient not taking: Reported on 2/20/2019) 1 each 0     order for DME Equipment being ordered: Class 2 (30-40mmHg) compression knee high stockings, night time knee high compression alternative, bandaging supplies (Patient not taking: Reported on 2/20/2019) 1 each 0       No Known Allergies    BP (!) 150/95   Pulse 60   Temp 97.8  F (36.6  C) (Oral)   Ht 1.829 m (6')   Wt 140.4 kg (309 lb 9.6 oz)   SpO2 98%   BMI 41.99 kg/m       Physical Exam:    In general he appears healthy.  HEENT exam shows no scleral icterus or temporal muscle wasting.  Chest is clear.  Abdominal exam shows no increase in girth.  No masses or tenderness to palpation are present.  His liver is 10 cm in span without left lobe enlargement.  No spleen tip is palpable.  Extremity exam shows no edema.  Skin exam shows no suspicious lesions..  Neurologic exam is non-focal.     Recent Results (from the past 168 hour(s))   Tacrolimus level    Collection Time: 02/20/19 11:57 AM   Result Value Ref Range    Tacrolimus Last Dose 800pm2/19/2019     Tacrolimus Level 6.3 5.0 - 15.0 ug/L   Hepatic  panel    Collection Time: 02/20/19 11:57 AM   Result Value Ref Range    Bilirubin Direct 0.4 (H) 0.0 - 0.2 mg/dL    Bilirubin Total 1.3 0.2 - 1.3 mg/dL    Albumin 3.8 3.4 - 5.0 g/dL    Protein Total 7.6 6.8 - 8.8 g/dL    Alkaline Phosphatase 118 40 - 150 U/L    ALT 74 (H) 0 - 70 U/L    AST 89 (H) 0 - 45 U/L   Basic metabolic panel    Collection Time: 02/20/19 11:57 AM   Result Value Ref Range    Sodium 137 133 - 144 mmol/L    Potassium 4.2 3.4 - 5.3 mmol/L    Chloride 106 94 - 109 mmol/L    Carbon Dioxide 24 20 - 32 mmol/L    Anion Gap 7 3 - 14 mmol/L    Glucose 95 70 - 99 mg/dL    Urea Nitrogen 20 7 - 30 mg/dL    Creatinine 1.08 0.66 - 1.25 mg/dL    GFR Estimate 77 >60 mL/min/[1.73_m2]    GFR Estimate If Black 89 >60 mL/min/[1.73_m2]    Calcium 8.4 (L) 8.5 - 10.1 mg/dL   CBC with platelets    Collection Time: 02/20/19 11:57 AM   Result Value Ref Range    WBC 3.9 (L) 4.0 - 11.0 10e9/L    RBC Count 3.69 (L) 4.4 - 5.9 10e12/L    Hemoglobin 13.7 13.3 - 17.7 g/dL    Hematocrit 41.3 40.0 - 53.0 %     (H) 78 - 100 fl    MCH 37.1 (H) 26.5 - 33.0 pg    MCHC 33.2 31.5 - 36.5 g/dL    RDW 13.6 10.0 - 15.0 %    Platelet Count 125 (L) 150 - 450 10e9/L      Impression/Plan:  1. Status post liver transplantation for alcoholic Cirrhosis    Liver and kidney functions are good, but creatine slightly elevated since last visit.     2. Alcoholic Cirrhosis     Continue to abstain from alcohol      3. Obesity     Continue with , recommended patient to lose weight     4. Skin Care maintenance     Recommended patient to see dermatologist   Recommended use of a broad spectrum sunscreen of SPF 30 ore more on all sun exposed sites daily.  Apply 20 minutes prior to exposure and repeat application every two hours or after sweating or swimming.  Avoid any intentional indoor or outdoor tanning.       5. Health Care Maintenance     Up to date on Vaccine     Up to date on cancer screening     No further intervention required.  Patient to report changes.     Follow-up in 6 months.         Staff Involved:  Staff/Scribe    Scribe Disclosure:   I, Alyson Chapa, am serving as a scribe to document services personally performed by Dr. Juan Simms, based on data collection and the provider's statements to me.        Juan Simms MD      Professor of Medicine  [employer]      Executive Medical Director, Solid Organ Transplant Program  [facility]        Juan Simms MD

## 2019-02-20 NOTE — PROGRESS NOTES
[facility] Hepatology Note      Encounter Date: 2019    CC:  Chief Complaint   Patient presents with     RECHECK     liver tx     History of Present Illness:  Mr. Blanco Osborne is a 53 year old male who presents as a follow-up for liver transplant. At today's visit the patient started training with an exercising couch. He started that two weeks ago. During the holiday time he was not exercising and was not eating well. Patient continues to take his tacrolimus 3 mg a day. Patient was wondering if he should be taking Vitamin B and Vitamin D. He has started taking a multivitamin. Patient was wondering if he should go see a dermatologist. Patient is other wise feeling well and has no further areas of concern.     Past Medical History:   Patient Active Problem List   Diagnosis     Cirrhosis of liver with ascites (H)     NAFLD (nonalcoholic fatty liver disease)     Liver transplant candidate     Liver transplanted (H)     Trauma     Immunosuppression (H)     Ascites of liver     Postoperative ileus (H)     Acute kidney injury (H)     Decreased flow of hepatic artery during surgery     Past Medical History:   Diagnosis Date     Ascites      Cirrhosis of liver with ascites (H) 2016     H/O alcohol abuse      HTN (hypertension)      NAFLD (nonalcoholic fatty liver disease) 2016     CHRISTIAN on CPAP      SBP (spontaneous bacterial peritonitis) (H)     [facility]     Past Surgical History:   Procedure Laterality Date     APPENDECTOMY       BENCH LIVER N/A 2016    Procedure: BENCH LIVER;  Surgeon: Enoc Crews MD;  Location: [facility] OR     COLONOSCOPY  13    repeat in 2018     HERNIA REPAIR       TRANSPLANT LIVER RECIPIENT  DONOR N/A 2016    Procedure: TRANSPLANT LIVER RECIPIENT  DONOR;  Surgeon: Enoc Crews MD;  Location: [facility] OR     Medications:  Current Outpatient Medications   Medication Sig Dispense Refill     acetaminophen (TYLENOL) 500 MG tablet Take  1,000 mg by mouth every 6 hours as needed for mild pain       amLODIPine (NORVASC) 5 MG tablet TAKE 1 TABLET EVERY DAY 90 tablet 3     aspirin 81 MG tablet Take 1 tablet (81 mg) by mouth daily 30 tablet 11     atenolol (TENORMIN) 50 MG tablet TAKE 1 TABLET EVERY DAY 90 tablet 3     Cholecalciferol (VITAMIN D3) 5000 UNITS TBDP Reported on 4/25/2017       multivitamin  with lutein (OCUVITE WITH LTEIN) CAPS per capsule Take 1 capsule by mouth daily       multivitamin, therapeutic with minerals (THERA-VIT-M) TABS Take 1 tablet by mouth daily       tacrolimus (GENERIC EQUIVALENT) 1 MG capsule Take 1 mg in the AM, and 2 mg in the PM. 12 hours apart 270 capsule 3     order for DME Equipment being ordered: Compression knee high stockings for B LE lymphedema 20-30mmHg (Patient not taking: Reported on 2/20/2019) 1 each 0     order for DME Equipment being ordered: Class 2 (30-40mmHg) compression knee high stockings, night time knee high compression alternative, bandaging supplies (Patient not taking: Reported on 2/20/2019) 1 each 0       No Known Allergies    BP (!) 150/95   Pulse 60   Temp 97.8  F (36.6  C) (Oral)   Ht 1.829 m (6')   Wt 140.4 kg (309 lb 9.6 oz)   SpO2 98%   BMI 41.99 kg/m      Physical Exam:    In general he appears healthy.  HEENT exam shows no scleral icterus or temporal muscle wasting.  Chest is clear.  Abdominal exam shows no increase in girth.  No masses or tenderness to palpation are present.  His liver is 10 cm in span without left lobe enlargement.  No spleen tip is palpable.  Extremity exam shows no edema.  Skin exam shows no suspicious lesions..  Neurologic exam is non-focal.     Recent Results (from the past 168 hour(s))   Tacrolimus level    Collection Time: 02/20/19 11:57 AM   Result Value Ref Range    Tacrolimus Last Dose 800pm2/19/2019     Tacrolimus Level 6.3 5.0 - 15.0 ug/L   Hepatic panel    Collection Time: 02/20/19 11:57 AM   Result Value Ref Range    Bilirubin Direct 0.4 (H) 0.0 -  0.2 mg/dL    Bilirubin Total 1.3 0.2 - 1.3 mg/dL    Albumin 3.8 3.4 - 5.0 g/dL    Protein Total 7.6 6.8 - 8.8 g/dL    Alkaline Phosphatase 118 40 - 150 U/L    ALT 74 (H) 0 - 70 U/L    AST 89 (H) 0 - 45 U/L   Basic metabolic panel    Collection Time: 02/20/19 11:57 AM   Result Value Ref Range    Sodium 137 133 - 144 mmol/L    Potassium 4.2 3.4 - 5.3 mmol/L    Chloride 106 94 - 109 mmol/L    Carbon Dioxide 24 20 - 32 mmol/L    Anion Gap 7 3 - 14 mmol/L    Glucose 95 70 - 99 mg/dL    Urea Nitrogen 20 7 - 30 mg/dL    Creatinine 1.08 0.66 - 1.25 mg/dL    GFR Estimate 77 >60 mL/min/[1.73_m2]    GFR Estimate If Black 89 >60 mL/min/[1.73_m2]    Calcium 8.4 (L) 8.5 - 10.1 mg/dL   CBC with platelets    Collection Time: 02/20/19 11:57 AM   Result Value Ref Range    WBC 3.9 (L) 4.0 - 11.0 10e9/L    RBC Count 3.69 (L) 4.4 - 5.9 10e12/L    Hemoglobin 13.7 13.3 - 17.7 g/dL    Hematocrit 41.3 40.0 - 53.0 %     (H) 78 - 100 fl    MCH 37.1 (H) 26.5 - 33.0 pg    MCHC 33.2 31.5 - 36.5 g/dL    RDW 13.6 10.0 - 15.0 %    Platelet Count 125 (L) 150 - 450 10e9/L      Impression/Plan:  1. Status post liver transplantation for alcoholic Cirrhosis    Liver and kidney functions are good, but creatine slightly elevated since last visit.     2. Alcoholic Cirrhosis     Continue to abstain from alcohol      3. Obesity     Continue with , recommended patient to lose weight     4. Skin Care maintenance     Recommended patient to see dermatologist   Recommended use of a broad spectrum sunscreen of SPF 30 ore more on all sun exposed sites daily.  Apply 20 minutes prior to exposure and repeat application every two hours or after sweating or swimming.  Avoid any intentional indoor or outdoor tanning.       5. Health Care Maintenance     Up to date on Vaccine     Up to date on cancer screening     No further intervention required. Patient to report changes.     Follow-up in 6 months.         Staff Involved:  Staff/Scribe    Scribe  Disclosure:   I, Alyson Chapa, am serving as a scribe to document services personally performed by Dr. Juan Simms, based on data collection and the provider's statements to me.        Juan Simms MD      Professor of Medicine  [employer]      Executive Medical Director, Solid Organ Transplant Program  [facility]

## 2019-02-22 ENCOUNTER — TELEPHONE (OUTPATIENT)
Dept: GASTROENTEROLOGY | Facility: CLINIC | Age: 54
End: 2019-02-22

## 2019-02-22 NOTE — TELEPHONE ENCOUNTER
lft's up.  Likely related to increased weight.   would like Blanco to repeat hepatic panel in 1 month.  He is aware. Lab order faxed.

## 2019-02-22 NOTE — LETTER
OUTPATIENT OR TRANSITIONAL CARE  LABORATORY TEST ORDER    Patient Name: Blanco Osborne  Transplant Date: 9/20/2016   YOB: 1965  Issue Date: 02/22/19   [facility] MR#: [id number]  Expiration Date: 04/01/2019      Diagnoses: [x]      Liver Transplant (ICD-10 Z94.4)    [x]      Elevated liver tests (R 94.5)    Please fax results to [phone]    Frequency: once in Month of March 2019         [x] Hepatic panel (Albumin, Alk Phos, ALT, AST, Direct and Total Bili)          If you have questions, please call [phone] or [phone].    .

## 2019-03-11 ENCOUNTER — TELEPHONE (OUTPATIENT)
Dept: GASTROENTEROLOGY | Facility: CLINIC | Age: 54
End: 2019-03-11

## 2019-03-11 RX ORDER — FUROSEMIDE 40 MG
40 TABLET ORAL
COMMUNITY
Start: 2015-12-14 | End: 2019-10-03

## 2019-03-11 RX ORDER — SPIRONOLACTONE 50 MG/1
50 TABLET, FILM COATED ORAL
COMMUNITY
Start: 2016-03-04 | End: 2019-10-03

## 2019-03-11 RX ORDER — SPIRONOLACTONE 25 MG/1
25 TABLET ORAL
COMMUNITY
Start: 2016-05-31 | End: 2019-10-03

## 2019-03-11 RX ORDER — WARFARIN SODIUM 5 MG/1
TABLET ORAL
COMMUNITY
Start: 2016-10-27 | End: 2019-10-03

## 2019-03-11 RX ORDER — URSODIOL 300 MG/1
600 CAPSULE ORAL
COMMUNITY
Start: 2016-05-31 | End: 2022-11-12

## 2019-03-11 RX ORDER — TRAMADOL HYDROCHLORIDE 50 MG/1
50 TABLET ORAL
COMMUNITY
Start: 2016-03-16 | End: 2019-10-04

## 2019-03-11 RX ORDER — OMEPRAZOLE 40 MG/1
40 CAPSULE, DELAYED RELEASE ORAL
COMMUNITY
Start: 2016-03-07 | End: 2019-10-03

## 2019-03-11 RX ORDER — METOPROLOL SUCCINATE 50 MG/1
50 TABLET, EXTENDED RELEASE ORAL
COMMUNITY
Start: 2015-12-14 | End: 2019-10-03

## 2019-03-11 NOTE — TELEPHONE ENCOUNTER
Associated Diagnoses     Liver transplanted (H) [Z94.4]  - Primary         Referring MD: Juan Simms    with request pt contact Endoscopy Pre-assessment RN to review upcoming procedure [facility] Endoscopy  information.      Telephone call-back number provided.  Kailee Mariee, PETER    Additional Information regarding appointment:   _____________________________  Patient scheduled for:  Colonoscopy  Indication for procedure: Screening  Date/Arrival time: Monday March 18th@10:20 am  Procedure Provider:  Dr. Torres  Referring Provider. Juan Simms MD  Prep Type:   []Golytely eRx: (not sent)  []NPO /p [place], No solid food /p 2200 the night before  Facility location:    [][address], 1[address] Floor, [address]  [x][address] [facility], 5th floor   Anticoagulants or blood thinners: Yes, coumadin; will need INR lab draw before exam

## 2019-03-12 ENCOUNTER — TELEPHONE (OUTPATIENT)
Dept: GASTROENTEROLOGY | Facility: CLINIC | Age: 54
End: 2019-03-12

## 2019-03-12 NOTE — TELEPHONE ENCOUNTER
CSAR and my chart message to patient to call back at his convenience to reschedule colonoscopy at the clinic and surgery center. He was originally scheduled on Monday march 18thl with Dr. Torres.

## 2019-05-09 DIAGNOSIS — Z94.4 LIVER TRANSPLANTED (H): ICD-10-CM

## 2019-05-09 RX ORDER — TACROLIMUS 1 MG/1
CAPSULE ORAL
Qty: 270 CAPSULE | Refills: 3 | Status: SHIPPED | OUTPATIENT
Start: 2019-05-09 | End: 2020-06-03

## 2019-08-01 ENCOUNTER — TELEPHONE (OUTPATIENT)
Dept: TRANSPLANT | Facility: CLINIC | Age: 54
End: 2019-08-01

## 2019-08-01 DIAGNOSIS — Z13.220 LIPID SCREENING: ICD-10-CM

## 2019-08-01 DIAGNOSIS — Z94.4 LIVER REPLACED BY TRANSPLANT (H): ICD-10-CM

## 2019-08-01 NOTE — TELEPHONE ENCOUNTER
Called pt and informed him that the updated lab orders were faxed but mistakenly faxed a lab order for a ethyl glucuronide test that isn't necessary as pt did not develop cirrhosis from alcohol. Pt understood and will discuss with lab staff.

## 2019-08-01 NOTE — LETTER
OUTPATIENT OR TRANSITIONAL CARE  LABORATORY TEST ORDER    [facility] Suzie:  Fax# [phone]    Patient Name: Blanco Osborne  Transplant Date: 9/20/2016   YOB: 1965  Issue Date: 08/01/2019  [facility] MR#: [id number]  Expiration Date: 08/01/2020      Diagnoses: [x]      Liver Transplant (ICD-10 Z94.4)    [x]      Long term use of medications (ICD-10 Z79.899)     Please fax results to [phone]    Frequency: Every 2 to 3 months, and PRN        [x] CBC with Platelets   [x] Basic Metabolic Panel (Sodium, Potassium, Chloride, CO2, Creatinine, Urea Nitrogen, Glucose, Calcium)  [x] Hepatic panel (Albumin, Alk Phos, ALT, AST, Direct and Total Bili)  [x] Tacrolimus drug level - 12 hour trough, please document time of last dose     Other Frequency: annually ONCE in October 2019  [x]      Fasting lipid panel  [x] Random urine protein  [x] Urinalysis with reflex to micro    If you have questions, please call [phone] or [phone].    .

## 2019-08-01 NOTE — TELEPHONE ENCOUNTER
Blanco is going in for lab draw at noon 08/01/2019;  [facility] needs updated Lab order faxed # [phone]

## 2019-08-01 NOTE — LETTER
PHYSICIAN ORDERS            Due Now      DATE & TIME ISSUED: 2019 10:44 AM  PATIENT NAME: Blanco Osborne   : 1965     [facility] MR# [if applicable]: [id number]     DIAGNOSIS:  Liver transplant  ICD-10 CODE: Z94.4.     Ethyl glucuronide urine      Any questions please call: [phone]    Please fax results to [phone].    .

## 2019-08-06 ENCOUNTER — TELEPHONE (OUTPATIENT)
Dept: TRANSPLANT | Facility: CLINIC | Age: 54
End: 2019-08-06

## 2019-08-06 NOTE — TELEPHONE ENCOUNTER
Tacrolimus level 6.8.  Lab was drawn at 3p w/ no time noted of last dose.  Please call Blanco, remind him of the importance of 12 hour trough levels and ask him to repeat in a week or 2.

## 2019-08-21 ENCOUNTER — DOCUMENTATION ONLY (OUTPATIENT)
Dept: TRANSPLANT | Facility: CLINIC | Age: 54
End: 2019-08-21

## 2019-09-09 ENCOUNTER — DOCUMENTATION ONLY (OUTPATIENT)
Dept: CARE COORDINATION | Facility: CLINIC | Age: 54
End: 2019-09-09

## 2019-09-27 ENCOUNTER — TELEPHONE (OUTPATIENT)
Dept: GASTROENTEROLOGY | Facility: CLINIC | Age: 54
End: 2019-09-27

## 2019-09-27 DIAGNOSIS — Z79.01 LONG TERM CURRENT USE OF ANTICOAGULANT THERAPY: Primary | ICD-10-CM

## 2019-09-27 DIAGNOSIS — Z12.11 SPECIAL SCREENING FOR MALIGNANT NEOPLASMS, COLON: ICD-10-CM

## 2019-09-27 RX ORDER — BISACODYL 5 MG/1
TABLET, DELAYED RELEASE ORAL
Qty: 4 TABLET | Refills: 0 | Status: SHIPPED | OUTPATIENT
Start: 2019-10-02 | End: 2019-10-03

## 2019-09-27 NOTE — TELEPHONE ENCOUNTER
Patient Name: Blanco Osborne   : 1965  MRN: [id number]       : [x] N/A      Additional Information regarding appointment:      Patient scheduled for:   [x] Colonoscopy      Indication for procedure. [x] Screening   [x] Liver transplanted (H) [Z94.4]  - Primary     Sedation Type: [x] Conscious Sedation      Procedure Provider:  Dr. Go Chong      Referring Provider. Dr. Simms    Arrival time verified:  / Friday / 10/4/19    Facility location verified:       [x][address], 5th floor     Prep Type:   [x]Golytely & dulcolax eRx: [facility], [address] [place] [address]; [phone]      Anticoagulants or blood thinners:  Discontinued coumadin per patient.             Electronic implanted devices: [x] No      H&P / Pre op physical completed: [x] N/A,    _Patient uses CPAP--CHRISTIAN ______________________________________________      Instructions given:   [x] Reviewed       [x] Resent via [employer] - This includes: Golytely  instructions, Conscious Sedation policy /, procedure date/time/location/provider.           Pre procedure teaching completed: [x] Yes - Reviewed,       [x] No questions regarding Sedation as ordered, [x]      Transportation from procedure & responsible adult to be with patient following procedure for a minimum of 6 hrs (Conscious Sedation) 24 hrs (MAC): [x] Yes   - confirmed will have post-procedure companionship as required,        Kailee Mariee RN,  [facility] Endoscopy

## 2019-10-01 ENCOUNTER — TELEPHONE (OUTPATIENT)
Dept: GASTROENTEROLOGY | Facility: CLINIC | Age: 54
End: 2019-10-01

## 2019-10-01 ENCOUNTER — HEALTH MAINTENANCE LETTER (OUTPATIENT)
Age: 54
End: 2019-10-01

## 2019-10-01 NOTE — TELEPHONE ENCOUNTER
Instructions given:   [x] Resent via [employer]  - This includes Split-dose Golytely  instructions, Conscious Sedation policy, procedure date/time/location/provider.      Provided Rx information sent to his pharmacy 9/27/2019 by KEITH Mariee RN: [id number] - [place], [place] - [address] [facility] RASHIDA Meade RN  [facility]

## 2019-10-04 ENCOUNTER — ANESTHESIA EVENT (OUTPATIENT)
Dept: SURGERY | Facility: AMBULATORY SURGERY CENTER | Age: 54
End: 2019-10-04

## 2019-10-04 ENCOUNTER — ANESTHESIA (OUTPATIENT)
Dept: SURGERY | Facility: AMBULATORY SURGERY CENTER | Age: 54
End: 2019-10-04

## 2019-10-04 ENCOUNTER — HOSPITAL ENCOUNTER (OUTPATIENT)
Facility: AMBULATORY SURGERY CENTER | Age: 54
End: 2019-10-04
Attending: INTERNAL MEDICINE
Payer: COMMERCIAL

## 2019-10-04 VITALS
RESPIRATION RATE: 16 BRPM | BODY MASS INDEX: 39.28 KG/M2 | HEIGHT: 72 IN | OXYGEN SATURATION: 95 % | HEART RATE: 74 BPM | WEIGHT: 290 LBS | DIASTOLIC BLOOD PRESSURE: 76 MMHG | SYSTOLIC BLOOD PRESSURE: 123 MMHG

## 2019-10-04 VITALS — HEART RATE: 117 BPM

## 2019-10-04 LAB — COLONOSCOPY: NORMAL

## 2019-10-04 PROCEDURE — 88305 TISSUE EXAM BY PATHOLOGIST: CPT | Performed by: INTERNAL MEDICINE

## 2019-10-04 RX ORDER — NALOXONE HYDROCHLORIDE 0.4 MG/ML
.1-.4 INJECTION, SOLUTION INTRAMUSCULAR; INTRAVENOUS; SUBCUTANEOUS
Status: DISCONTINUED | OUTPATIENT
Start: 2019-10-04 | End: 2019-10-05 | Stop reason: HOSPADM

## 2019-10-04 RX ORDER — LIDOCAINE 40 MG/G
CREAM TOPICAL
Status: DISCONTINUED | OUTPATIENT
Start: 2019-10-04 | End: 2019-10-04 | Stop reason: HOSPADM

## 2019-10-04 RX ORDER — PROPOFOL 10 MG/ML
INJECTION, EMULSION INTRAVENOUS CONTINUOUS PRN
Status: DISCONTINUED | OUTPATIENT
Start: 2019-10-04 | End: 2019-10-04

## 2019-10-04 RX ORDER — PROPOFOL 10 MG/ML
INJECTION, EMULSION INTRAVENOUS PRN
Status: DISCONTINUED | OUTPATIENT
Start: 2019-10-04 | End: 2019-10-04

## 2019-10-04 RX ORDER — SIMETHICONE
LIQUID (ML) MISCELLANEOUS PRN
Status: DISCONTINUED | OUTPATIENT
Start: 2019-10-04 | End: 2019-10-04 | Stop reason: HOSPADM

## 2019-10-04 RX ORDER — ONDANSETRON 4 MG/1
4 TABLET, ORALLY DISINTEGRATING ORAL EVERY 6 HOURS PRN
Status: DISCONTINUED | OUTPATIENT
Start: 2019-10-04 | End: 2019-10-05 | Stop reason: HOSPADM

## 2019-10-04 RX ORDER — FLUMAZENIL 0.1 MG/ML
0.2 INJECTION, SOLUTION INTRAVENOUS
Status: DISCONTINUED | OUTPATIENT
Start: 2019-10-04 | End: 2019-10-05 | Stop reason: HOSPADM

## 2019-10-04 RX ORDER — ONDANSETRON 2 MG/ML
4 INJECTION INTRAMUSCULAR; INTRAVENOUS
Status: DISCONTINUED | OUTPATIENT
Start: 2019-10-04 | End: 2019-10-04 | Stop reason: HOSPADM

## 2019-10-04 RX ORDER — SODIUM CHLORIDE, SODIUM LACTATE, POTASSIUM CHLORIDE, CALCIUM CHLORIDE 600; 310; 30; 20 MG/100ML; MG/100ML; MG/100ML; MG/100ML
500 INJECTION, SOLUTION INTRAVENOUS CONTINUOUS
Status: DISCONTINUED | OUTPATIENT
Start: 2019-10-04 | End: 2019-10-04 | Stop reason: HOSPADM

## 2019-10-04 RX ORDER — LIDOCAINE HYDROCHLORIDE 20 MG/ML
INJECTION, SOLUTION INFILTRATION; PERINEURAL PRN
Status: DISCONTINUED | OUTPATIENT
Start: 2019-10-04 | End: 2019-10-04

## 2019-10-04 RX ORDER — ONDANSETRON 2 MG/ML
4 INJECTION INTRAMUSCULAR; INTRAVENOUS EVERY 6 HOURS PRN
Status: DISCONTINUED | OUTPATIENT
Start: 2019-10-04 | End: 2019-10-05 | Stop reason: HOSPADM

## 2019-10-04 RX ADMIN — LIDOCAINE HYDROCHLORIDE 100 MG: 20 INJECTION, SOLUTION INFILTRATION; PERINEURAL at 13:12

## 2019-10-04 RX ADMIN — PROPOFOL 50 MG: 10 INJECTION, EMULSION INTRAVENOUS at 13:14

## 2019-10-04 RX ADMIN — SODIUM CHLORIDE, SODIUM LACTATE, POTASSIUM CHLORIDE, CALCIUM CHLORIDE: 600; 310; 30; 20 INJECTION, SOLUTION INTRAVENOUS at 12:43

## 2019-10-04 RX ADMIN — PROPOFOL 200 MCG/KG/MIN: 10 INJECTION, EMULSION INTRAVENOUS at 13:12

## 2019-10-04 ASSESSMENT — MIFFLIN-ST. JEOR: SCORE: 2188.43

## 2019-10-04 NOTE — DISCHARGE INSTRUCTIONS
Discharge Instructions after Colonoscopy  or Sigmoidoscopy    Today you had a _x___ Colonoscopy ____ Sigmoidoscopy    Activity and Diet  You were given medicine for pain. You may be dizzy or sleepy.  For 24 hours:    Do not drive or use heavy equipment.    Do not make important decisions.    Do not drink any alcohol.  You may return to your normal diet and medicines.    Discomfort    Air was placed in your colon during the exam in order to see it. Walking helps to pass the air.    You may take Tylenol (acetaminophen) for pain unless your doctor has told you not to.  Do not take aspirin or ibuprofen (Advil, Motrin, or other anti-inflammatory  drugs) for _____ days.    Follow-up  ____ We took small tissue samples or polyps to study. Your doctor will call you with the results  within two weeks.    When to call:    Call right away if you have:    Unusual pain in belly or chest pain not relieved with passing air.    More than 1 to 2 Tablespoons of bleeding from your rectum.    Fever above 100.6  F (37.5  C).    If you have severe pain, bleeding, or shortness of breath, go to an emergency room.    If you have questions, call:  Monday to Friday, 7 a.m. to 4:30 p.m.  Endoscopy: [phone] (We may have to call you back)    After hours  Hospital: [phone] (Ask for the GI fellow on call)

## 2019-10-04 NOTE — ANESTHESIA CARE TRANSFER NOTE
Patient: Blanco Osborne    Procedure(s):  COLONOSCOPY, WITH POLYPECTOMY AND BIOPSY  Colonoscopy, Flexible, With Lesion Removal Using Snare  Esophagogastroduodenoscopy, With Tattooing  Colonoscopy, With Hemorrhage Control    Diagnosis: Liver transplanted (H) [Z94.4]  Diagnosis Additional Information: No value filed.    Anesthesia Type:   MAC     Note:  Airway :Room Air  Patient transferred to:Phase II  Comments: Uneventful transport to Phase II: VSS; IV patent; pt comfortable; Report given to RN; pt. Responds appropriately to commandsHandoff Report: Identifed the Patient, Identified the Reponsible Provider, Reviewed the pertinent medical history, Discussed the surgical course, Reviewed Intra-OP anesthesia mangement and issues during anesthesia, Set expectations for post-procedure period and Allowed opportunity for questions and acknowledgement of understanding      Vitals: (Last set prior to Anesthesia Care Transfer)    CRNA VITALS  10/4/2019 1327 - 10/4/2019 1357      10/4/2019             Pulse:  64    SpO2:  100 %                Electronically Signed By: STEFFANY Mckinney CRNA  October 4, 2019  1:57 PM

## 2019-10-04 NOTE — PRE-PROCEDURE
Blanco S Dylon  [id number]  male  54 year old      Reason for procedure/surgery: surveillance colonoscopy    Patient Active Problem List   Diagnosis     Cirrhosis of liver with ascites (H)     NAFLD (nonalcoholic fatty liver disease)     Liver transplant candidate     Liver transplanted (H)     Trauma     Immunosuppression (H)     Ascites of liver     Postoperative ileus (H)     Acute kidney injury (H)     Decreased flow of hepatic artery during surgery     Coagulopathy (H)     Hyperlipidemia     Hypertension     Leukocytosis     Pleural effusion     SBP (spontaneous bacterial peritonitis) (H)       Past Surgical History:    Past Surgical History:   Procedure Laterality Date     APPENDECTOMY       BENCH LIVER N/A 2016    Procedure: BENCH LIVER;  Surgeon: Enoc Crews MD;  Location: [facility] OR     COLONOSCOPY  13    repeat in 2018     HERNIA REPAIR       TRANSPLANT LIVER RECIPIENT  DONOR N/A 2016    Procedure: TRANSPLANT LIVER RECIPIENT  DONOR;  Surgeon: Enoc Crews MD;  Location: [facility] OR       Past Medical History:   Past Medical History:   Diagnosis Date     Ascites      Cirrhosis of liver with ascites (H) 2016     H/O alcohol abuse      HTN (hypertension)      NAFLD (nonalcoholic fatty liver disease) 2016     CHRISTIAN on CPAP      SBP (spontaneous bacterial peritonitis) (H)     [facility]       Social History:   Social History     Tobacco Use     Smoking status: Never Smoker     Smokeless tobacco: Never Used   Substance Use Topics     Alcohol use: Yes     Alcohol/week: 0.0 standard drinks     Comment: 1-2 per month       Family History: History reviewed. No pertinent family history.    Allergies: No Known Allergies    Active Medications:   Current Outpatient Medications   Medication Sig Dispense Refill     acetaminophen (TYLENOL) 500 MG tablet Take 1,000 mg by mouth every 6 hours as needed for mild pain       amLODIPine (NORVASC) 5 MG tablet TAKE 1 TABLET EVERY DAY 90  tablet 3     aspirin 81 MG tablet Take 1 tablet (81 mg) by mouth daily 30 tablet 11     atenolol (TENORMIN) 50 MG tablet TAKE 1 TABLET EVERY DAY 90 tablet 3     Cholecalciferol (VITAMIN D3) 5000 UNITS TBDP Reported on 4/25/2017       multivitamin  with lutein (OCUVITE WITH LTEIN) CAPS per capsule Take 1 capsule by mouth daily       tacrolimus (GENERIC EQUIVALENT) 1 MG capsule Take 1 mg in the AM, and 2 mg in the PM. 12 hours apart 270 capsule 3     order for DME Equipment being ordered: Compression knee high stockings for B LE lymphedema 20-30mmHg (Patient not taking: Reported on 2/20/2019) 1 each 0     order for DME Equipment being ordered: Class 2 (30-40mmHg) compression knee high stockings, night time knee high compression alternative, bandaging supplies (Patient not taking: Reported on 2/20/2019) 1 each 0     ursodiol (ACTIGALL) 300 MG capsule Take 600 mg by mouth         Systemic Review:   CONSTITUTIONAL: NEGATIVE for fever, chills, change in weight  ENT/MOUTH: NEGATIVE for ear, mouth and throat problems  RESP: NEGATIVE for significant cough or SOB  CV: NEGATIVE for chest pain, palpitations or peripheral edema    Physical Examination:   Vital Signs: /76   Pulse 74   Resp 16   Ht 1.829 m (6')   Wt 131.5 kg (290 lb)   SpO2 95%   BMI 39.33 kg/m    GENERAL: healthy, alert and no distress  NECK: no adenopathy, no asymmetry, masses, or scars  RESP: lungs clear to auscultation - no rales, rhonchi or wheezes  CV: regular rate and rhythm, normal S1 S2, no S3 or S4, no murmur, click or rub, no peripheral edema and peripheral pulses strong  ABDOMEN: soft, nontender, no hepatosplenomegaly, no masses and bowel sounds normal  MS: no gross musculoskeletal defects noted, no edema    Plan: Appropriate to proceed as scheduled.      Go Chong MD  10/4/2019    PCP:  Ki Powers

## 2019-10-04 NOTE — ANESTHESIA POSTPROCEDURE EVALUATION
Anesthesia POST Procedure Evaluation    Patient: Blanco Osborne   MRN:     [id number] Gender:   male   Age:    54 year old :      1965        Preoperative Diagnosis: Liver transplanted (H) [Z94.4]   Procedure(s):  COLONOSCOPY, WITH POLYPECTOMY AND BIOPSY  Colonoscopy, Flexible, With Lesion Removal Using Snare  Esophagogastroduodenoscopy, With Tattooing  Colonoscopy, With Hemorrhage Control   Postop Comments: No value filed.       Anesthesia Type:  Not documented  MAC    Reportable Event: NO     PAIN: Uncomplicated   Sign Out status: Comfortable, Well controlled pain     PONV: No PONV   Sign Out status:  No Nausea or Vomiting     Neuro/Psych: Uneventful perioperative course   Sign Out Status: Preoperative baseline; Age appropriate mentation     Airway/Resp.: Uneventful perioperative course   Sign Out Status: Non labored breathing, age appropriate RR; Resp. Status within EXPECTED Parameters     CV: Uneventful perioperative course   Sign Out status: Appropriate BP and perfusion indices; Appropriate HR/Rhythm     Disposition:   Sign Out in:  GI suite  Disposition:  Home  Recovery Course: Uneventful  Follow-Up: Not required           Last Anesthesia Record Vitals:  CRNA VITALS  10/4/2019 1327 - 10/4/2019 1427      10/4/2019             EKG:  Sinus rhythm          Last PACU Vitals:  Vitals Value Taken Time   BP     Temp     Pulse 117 10/4/2019  1:50 PM   Resp     SpO2     Temp src Available 10/4/2019  1:45 PM   NIBP 153/126 10/4/2019  1:50 PM   Pulse 64 10/4/2019  1:53 PM   SpO2 100 % 10/4/2019  1:53 PM   Resp     Temp     Ht Rate 64 10/4/2019  1:50 PM   Temp 2           Electronically Signed By: Guanakito Wilburn MD, 2019, 2:30 PM

## 2019-10-04 NOTE — ANESTHESIA PREPROCEDURE EVALUATION
Anesthesia Pre-Procedure Evaluation    Patient: Blanco Osborne   MRN:     [id number] Gender:   male   Age:    54 year old :      1965        Preoperative Diagnosis: Liver transplanted (H) [Z94.4]   Procedure(s):  COLONOSCOPY     Past Medical History:   Diagnosis Date     Ascites      Cirrhosis of liver with ascites (H) 2016     H/O alcohol abuse      HTN (hypertension)      NAFLD (nonalcoholic fatty liver disease) 2016     CHRISTIAN on CPAP      SBP (spontaneous bacterial peritonitis) (H)     [facility]      Past Surgical History:   Procedure Laterality Date     APPENDECTOMY       BENCH LIVER N/A 2016    Procedure: BENCH LIVER;  Surgeon: Enoc Crews MD;  Location: [facility]     COLONOSCOPY  13    repeat in 2018     HERNIA REPAIR       TRANSPLANT LIVER RECIPIENT  DONOR N/A 2016    Procedure: TRANSPLANT LIVER RECIPIENT  DONOR;  Surgeon: Enoc Crews MD;  Location: [facility]          Anesthesia Evaluation     . Pt has had prior anesthetic.     No history of anesthetic complications          ROS/MED HX    ENT/Pulmonary:     (+)sleep apnea, doesn't use CPAP , . .    Neurologic:  - neg neurologic ROS     Cardiovascular:     (+) Dyslipidemia, hypertension----. : . . . :. .       METS/Exercise Tolerance:     Hematologic:  - neg hematologic  ROS       Musculoskeletal:  - neg musculoskeletal ROS       GI/Hepatic:     (+) liver disease (NAFLD cirrhosis s/p liver transplant ),       Renal/Genitourinary:     (+) chronic renal disease, type: CRI,       Endo:     (+) Chronic steroid usage for Post Transplant Immunosuppression Obesity, .      Psychiatric:  - neg psychiatric ROS       Infectious Disease:  - neg infectious disease ROS       Malignancy:      - no malignancy   Other:    - neg other ROS                 JZ[facility] AN PHYSICAL EXAM    LABS:  CBC:   Lab Results   Component Value Date    WBC 3.9 (L) 2019    WBC 3.8 (L) 07/10/2018    HGB 13.7 2019    HGB 12.7 (L)  07/10/2018    HCT 41.3 02/20/2019    HCT 36.5 (L) 07/10/2018     (L) 02/20/2019     (L) 07/10/2018     BMP:   Lab Results   Component Value Date     02/20/2019     07/10/2018    POTASSIUM 4.2 02/20/2019    POTASSIUM 4.2 07/10/2018    CHLORIDE 106 02/20/2019    CHLORIDE 104 07/10/2018    CO2 24 02/20/2019    CO2 23 07/10/2018    BUN 20 02/20/2019    BUN 25 07/10/2018    CR 1.08 02/20/2019    CR 1.32 (H) 07/10/2018    GLC 95 02/20/2019    GLC 89 07/10/2018     COAGS:   Lab Results   Component Value Date    PTT 35 09/28/2016    INR 1.26 (H) 10/17/2017    FIBR 231 09/21/2016     POC:   Lab Results   Component Value Date     (H) 09/28/2016     OTHER:   Lab Results   Component Value Date    PH 7.45 09/21/2016    LACT 2.5 (H) 09/21/2016    A1C 4.8 09/22/2016    KELLY 8.4 (L) 02/20/2019    PHOS 3.4 10/17/2017    MAG 1.5 (L) 10/17/2017    ALBUMIN 3.8 02/20/2019    PROTTOTAL 7.6 02/20/2019    ALT 74 (H) 02/20/2019    AST 89 (H) 02/20/2019    ALKPHOS 118 02/20/2019    BILITOTAL 1.3 02/20/2019    LIPASE 63 (L) 09/22/2016    AMYLASE 72 09/22/2016    TSH 1.76 03/01/2016        Preop Vitals    BP Readings from Last 3 Encounters:   10/04/19 123/76   02/20/19 (!) 150/95   07/10/18 (!) 152/93    Pulse Readings from Last 3 Encounters:   10/04/19 74   02/20/19 60   07/10/18 60      Resp Readings from Last 3 Encounters:   10/04/19 16   05/31/17 16   04/25/17 20    SpO2 Readings from Last 3 Encounters:   10/04/19 95%   02/20/19 98%   07/10/18 97%      Temp Readings from Last 1 Encounters:   02/20/19 36.6  C (97.8  F) (Oral)    Ht Readings from Last 1 Encounters:   10/04/19 1.829 m (6')      Wt Readings from Last 1 Encounters:   10/04/19 131.5 kg (290 lb)    Estimated body mass index is 39.33 kg/m  as calculated from the following:    Height as of this encounter: 1.829 m (6').    Weight as of this encounter: 131.5 kg (290 lb).     LDA:  Peripheral IV 10/03/16 Right Lower forearm (Active)   Site Assessment  [facility] 10/3/2016  6:00 PM   Phlebitis Scale 0-->no symptoms 10/3/2016  6:00 PM   Infiltration Scale 0 10/3/2016  6:00 PM   Dressing Intervention New dressing  10/3/2016  2:00 PM   Number of days: 1096       Peripheral IV 10/04/19 Right Hand (Active)   Site Assessment [facility] 10/4/2019 12:54 PM   Line Status Saline locked 10/4/2019 12:54 PM   Phlebitis Scale 0-->no symptoms 10/4/2019 12:54 PM   Infiltration Scale 0 10/4/2019 12:54 PM   Extravasation? No 10/4/2019 12:54 PM   Dressing Intervention New dressing  10/4/2019 12:54 PM   Number of days: 0       Closed/Suction Drain 1 Right Abdomen 19 [place] (Active)   Site Description [facility] 9/28/2016  3:15 PM   Dressing Status Drainage - Copious 9/28/2016  3:15 PM   Dressing Change Due 09/28/16 9/28/2016  3:15 PM   Drainage Appearance Bloody/Bright Red 9/28/2016  3:15 PM   Status To bulb suction 9/28/2016  3:15 PM   Output (ml) 150 ml 9/28/2016  3:15 PM   Number of days: 1109        Assessment:   ASA SCORE: 3    H&P: History and physical reviewed and following examination; no interval change.   Smoking Status:  Non-Smoker/Unknown   NPO Status: NPO Appropriate     Plan:   Anes. Type:  MAC   Pre-Medication: None   Induction:  N/a   Airway: Native Airway   Access/Monitoring: PIV   Maintenance: Propofol Sedation     Postop Plan:   Postop Pain: None  Postop Sedation/Airway: Not planned  Disposition: Outpatient     PONV Management:   Adult Risk Factors:, Non-Smoker   Prevention:, Propofol     CONSENT: Direct conversation   Plan and risks discussed with: Patient   Blood Products: Consent Deferred (Minimal Blood Loss)                   Guanakito Wilburn MD

## 2019-10-07 ENCOUNTER — OFFICE VISIT (OUTPATIENT)
Dept: GASTROENTEROLOGY | Facility: CLINIC | Age: 54
End: 2019-10-07
Attending: INTERNAL MEDICINE
Payer: COMMERCIAL

## 2019-10-07 VITALS
SYSTOLIC BLOOD PRESSURE: 148 MMHG | DIASTOLIC BLOOD PRESSURE: 92 MMHG | TEMPERATURE: 98.7 F | HEART RATE: 70 BPM | BODY MASS INDEX: 41.12 KG/M2 | WEIGHT: 303.2 LBS | OXYGEN SATURATION: 97 %

## 2019-10-07 DIAGNOSIS — Z13.220 LIPID SCREENING: ICD-10-CM

## 2019-10-07 DIAGNOSIS — Z23 NEED FOR INFLUENZA VACCINATION: ICD-10-CM

## 2019-10-07 DIAGNOSIS — Z79.01 LONG TERM CURRENT USE OF ANTICOAGULANT THERAPY: ICD-10-CM

## 2019-10-07 DIAGNOSIS — Z94.4 LIVER REPLACED BY TRANSPLANT (H): Primary | ICD-10-CM

## 2019-10-07 DIAGNOSIS — Z94.4 LIVER REPLACED BY TRANSPLANT (H): ICD-10-CM

## 2019-10-07 LAB
ALBUMIN SERPL-MCNC: 3.8 G/DL (ref 3.4–5)
ALP SERPL-CCNC: 185 U/L (ref 40–150)
ALT SERPL W P-5'-P-CCNC: 52 U/L (ref 0–70)
ANION GAP SERPL CALCULATED.3IONS-SCNC: 6 MMOL/L (ref 3–14)
AST SERPL W P-5'-P-CCNC: 59 U/L (ref 0–45)
BILIRUB DIRECT SERPL-MCNC: 0.6 MG/DL (ref 0–0.2)
BILIRUB SERPL-MCNC: 1.3 MG/DL (ref 0.2–1.3)
BUN SERPL-MCNC: 18 MG/DL (ref 7–30)
CALCIUM SERPL-MCNC: 9.5 MG/DL (ref 8.5–10.1)
CHLORIDE SERPL-SCNC: 99 MMOL/L (ref 94–109)
CO2 SERPL-SCNC: 26 MMOL/L (ref 20–32)
CREAT SERPL-MCNC: 1.01 MG/DL (ref 0.66–1.25)
ERYTHROCYTE [DISTWIDTH] IN BLOOD BY AUTOMATED COUNT: 13.4 % (ref 10–15)
GFR SERPL CREATININE-BSD FRML MDRD: 83 ML/MIN/{1.73_M2}
GLUCOSE SERPL-MCNC: 102 MG/DL (ref 70–99)
HCT VFR BLD AUTO: 40.3 % (ref 40–53)
HGB BLD-MCNC: 13.7 G/DL (ref 13.3–17.7)
INR PPP: 1.2 (ref 0.86–1.14)
MCH RBC QN AUTO: 36.6 PG (ref 26.5–33)
MCHC RBC AUTO-ENTMCNC: 34 G/DL (ref 31.5–36.5)
MCV RBC AUTO: 108 FL (ref 78–100)
PLATELET # BLD AUTO: 194 10E9/L (ref 150–450)
POTASSIUM SERPL-SCNC: 4.3 MMOL/L (ref 3.4–5.3)
PROT SERPL-MCNC: 8 G/DL (ref 6.8–8.8)
RBC # BLD AUTO: 3.74 10E12/L (ref 4.4–5.9)
SODIUM SERPL-SCNC: 131 MMOL/L (ref 133–144)
WBC # BLD AUTO: 7.7 10E9/L (ref 4–11)

## 2019-10-07 PROCEDURE — 85610 PROTHROMBIN TIME: CPT | Performed by: INTERNAL MEDICINE

## 2019-10-07 PROCEDURE — G0008 ADMIN INFLUENZA VIRUS VAC: HCPCS | Mod: ZF

## 2019-10-07 PROCEDURE — 80197 ASSAY OF TACROLIMUS: CPT | Performed by: INTERNAL MEDICINE

## 2019-10-07 PROCEDURE — 90682 RIV4 VACC RECOMBINANT DNA IM: CPT | Mod: ZF | Performed by: INTERNAL MEDICINE

## 2019-10-07 PROCEDURE — 36415 COLL VENOUS BLD VENIPUNCTURE: CPT | Performed by: INTERNAL MEDICINE

## 2019-10-07 PROCEDURE — 25000128 H RX IP 250 OP 636: Mod: ZF | Performed by: INTERNAL MEDICINE

## 2019-10-07 PROCEDURE — 80076 HEPATIC FUNCTION PANEL: CPT | Performed by: INTERNAL MEDICINE

## 2019-10-07 PROCEDURE — 85027 COMPLETE CBC AUTOMATED: CPT | Performed by: INTERNAL MEDICINE

## 2019-10-07 PROCEDURE — G0463 HOSPITAL OUTPT CLINIC VISIT: HCPCS | Mod: 25,ZF

## 2019-10-07 PROCEDURE — 80048 BASIC METABOLIC PNL TOTAL CA: CPT | Performed by: INTERNAL MEDICINE

## 2019-10-07 RX ADMIN — INFLUENZA A VIRUS A/BRISBANE/02/2018 (H1N1) RECOMBINANT HEMAGGLUTININ ANTIGEN, INFLUENZA A VIRUS A/KANSAS/14/2017 (H3N2) RECOMBINANT HEMAGGLUTININ ANTIGEN, INFLUENZA B VIRUS B/PHUKET/3073/2013 RECOMBINANT HEMAGGLUTININ ANTIGEN, AND INFLUENZA B VIRUS B/MARYLAND/15/2016 RECOMBINANT HEMAGGLUTININ ANTIGEN 0.5 ML: 45; 45; 45; 45 INJECTION INTRAMUSCULAR at 15:20

## 2019-10-07 SDOH — HEALTH STABILITY: MENTAL HEALTH: HOW OFTEN DO YOU HAVE A DRINK CONTAINING ALCOHOL?: MONTHLY OR LESS

## 2019-10-07 ASSESSMENT — PAIN SCALES - GENERAL: PAINLEVEL: NO PAIN (0)

## 2019-10-07 NOTE — LETTER
10/7/2019      RE: Blanco Osborne  [address]  [facility] [address]       I had the pleasure of seeing Blanco Osborne for followup in the Liver Transplant Clinic at the [facility] on 10/07/2019.  Mr. 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      He is doing fairly well at this visit.  He denies any abdominal pain, itching or skin rash.  He has a moderate amount of fatigue.  He denies any increased abdominal girth or lower extremity edema.  He has not had any gastrointestinal bleeding.      He denies any fevers or chills.  He does have a cough that has been going on for about a week.  The cough is nonproductive.  There is no shortness of breath.  He denies any nausea or vomiting, diarrhea or constipation.  His appetite has been good, and he is trying to lose some weight.      He did have a colonoscopy on Friday that showed 2 polyps.  One was a 1.3 cm polyp taken from the cecum that was not recovered, and the other was a 3 mm polyp, and the path is still pending on that specimen.     Current Outpatient Medications   Medication     acetaminophen (TYLENOL) 500 MG tablet     amLODIPine (NORVASC) 5 MG tablet     aspirin 81 MG tablet     atenolol (TENORMIN) 50 MG tablet     Cholecalciferol (VITAMIN D3) 5000 UNITS TBDP     multivitamin  with lutein (OCUVITE WITH LTEIN) CAPS per capsule     tacrolimus (GENERIC EQUIVALENT) 1 MG capsule     order for DME     order for DME     ursodiol (ACTIGALL) 300 MG capsule     Current Facility-Administered Medications   Medication     influenza recomb quadrivalent PF (FLUBLOK) injection 0.5 mL     BP (!) 148/92 (BP Location: Left arm, Patient Position: Sitting, Cuff Size: Adult Large)   Pulse 70   Temp 98.7  F (37.1  C)   Wt 137.5 kg (303 lb 3.2 oz)   SpO2 97%   BMI 41.12 kg/m       PHYSICAL EXAMINATION:  In general, he looks well.   HEENT exam shows no scleral icterus or temporal muscle wasting.  His chest is clear.  His abdominal exam shows no increase in girth.  No masses or tenderness to palpation is present.  His liver is 10 cm in span without left lobe enlargement.  No spleen tip is palpable, and extremity exam shows no edema.  Skin exam shows no stigmata of chronic liver disease.  Neurologic exam shows no asterixis.     Recent Results (from the past 168 hour(s))   COLONOSCOPY    Collection Time: 10/04/19 12:40 PM   Result Value Ref Range    COLONOSCOPY       [facility]  [address] [place], [place] [address]., [place] [address] [phone]     Endoscopy Department  _______________________________________________________________________________  Patient Name: Blanco Osborne          Procedure Date: 10/4/2019 12:40 PM  MRN: [id number]                       Account Number: [id number]  YOB: 1965              Admit Type: Outpatient  Age: 54                                Gender: Male  Note Status: Finalized                Attending MD: Richard Sanderson for the Cause: Time out was completed. Total Sedation Time:   _______________________________________________________________________________     Procedure:           Colonoscopy  Indications:         High risk colon cancer surveillance: Personal history of                        colonic polyps  Providers:           Go Chong, Stephania White RN  Patient Profile:     This is a 54 year old male with liver transplant 2016                        who presents for  surveillance colonoscopy. Asymptomatic                        without melena or hematochezia. Last colonoscopy was                        2013 with one small tubular adenoma.  Referring MD:        Juan Simms MD  Medicines:           Monitored Anesthesia Care  Complications:       No immediate complications.  _______________________________________________________________________________  Procedure:            Pre-Anesthesia Assessment:                       - Prior to the procedure, a History and Physical was                        performed, and patient medications and allergies were                        reviewed. The patient is competent. The risks and                        benefits of the procedure and the sedation options and                        risks were discussed with the patient. All questions                        were answered and informed consent was obtained. Patient                        identification and proposed procedure were verified by                        t  physician in the pre-procedure area. Mental Status                        Examination: alert and oriented. Airway Examination:                        normal oropharyngeal airway and neck mobility.                        Respiratory Examination: clear to auscultation. CV                        Examination: normal. ASA Grade Assessment: III - A                        patient with severe systemic disease. After reviewing                        the risks and benefits, the patient was deemed in                        satisfactory condition to undergo the procedure. The                        anesthesia plan was to use moderate sedation / analgesia                        (conscious sedation). Immediately prior to                        administration of medications, the patient was                        re-assessed for adequacy to receive sedatives. The heart                        rate, respiratory rate, oxygen saturations, blood                        pressure, adequacy of pulmonary ve ntilation, and                        response to care were monitored throughout the                        procedure. The physical status of the patient was                        re-assessed after the procedure.                       After obtaining informed consent, the colonoscope was                        passed under direct vision. Throughout  the procedure,                        the patient's blood pressure, pulse, and oxygen                        saturations were monitored continuously. The Colonoscope                        was introduced through the anus and advanced to the                        terminal ileum. The colonoscopy was performed without                        difficulty. The patient tolerated the procedure well.                        The quality of the bowel preparation was good.                                                                                   Findings:       The perianal and digital rectal examinations were normal.       A 13 mm polyp was fou nd in the cecum. The polyp was pedunculated. The        polyp was removed with a saline injection-lift technique using a hot        snare. Resection was complete, but the polyp tissue was not retrieved as        the trap was not on. To prevent bleeding post-intervention, one        hemostatic clip was successfully placed. There was no bleeding during,        or at the end, of the procedure.       A 3 mm polyp was found in the transverse colon. The polyp was sessile.        The polyp was removed with a cold snare. Resection and retrieval were        complete.       Internal hemorrhoids were found during retroflexion. The hemorrhoids        were moderate.       Retroflexion in the right colon was performed.       The terminal ileum appeared normal.       The exam was otherwise without abnormality on direct and retroflexion        views.                                                                                   Impression:          One large (13mm) pedunculated cecal polyp was rese cted                        with hot snare after lifting and clipped at base but                        unfortunately was not retrieved as polyp trap was not                        on. One small transverse polyp was resected with cold                        snare.                       - One 13 mm  polyp in the cecum, removed using                        injection-lift and a hot snare. Complete resection.                        Polyp tissue not retrieved.                       - One 3 mm polyp in the transverse colon, removed with a                        cold snare. Resected and retrieved.                       - The examined portion of the ileum was normal.                       - The examination was otherwise normal on direct and                        retroflexion views.                       - Internal hemorrhoids.  Recommendation:      - Discharge patient to home (with escort).                       - Resume previous diet.                       - Await pathology results.                        - Repeat colonoscopy for surveillance based on pathology                        results.                                                                                     Electronically signed by: Go Chong MD  ____________________  Go Chong,   10/4/2019 2:05:22 PM  I was physically present for the entire viewing portion of the exam.  __________________________  Signature of teaching physician  Go Chong  Number of Addenda: 0    Note Initiated On: 10/4/2019 12:40 PM  Scope In:  Scope Out:     INR    Collection Time: 10/07/19  1:57 PM   Result Value Ref Range    INR 1.20 (H) 0.86 - 1.14   Hepatic panel    Collection Time: 10/07/19  1:57 PM   Result Value Ref Range    Bilirubin Direct 0.6 (H) 0.0 - 0.2 mg/dL    Bilirubin Total 1.3 0.2 - 1.3 mg/dL    Albumin 3.8 3.4 - 5.0 g/dL    Protein Total 8.0 6.8 - 8.8 g/dL    Alkaline Phosphatase 185 (H) 40 - 150 U/L    ALT 52 0 - 70 U/L    AST 59 (H) 0 - 45 U/L   Basic metabolic panel    Collection Time: 10/07/19  1:57 PM   Result Value Ref Range    Sodium 131 (L) 133 - 144 mmol/L    Potassium 4.3 3.4 - 5.3 mmol/L    Chloride 99 94 - 109 mmol/L    Carbon Dioxide 26 20 - 32 mmol/L    Anion Gap 6 3 - 14 mmol/L    Glucose 102 (H) 70 - 99 mg/dL    Urea Nitrogen  18 7 - 30 mg/dL    Creatinine 1.01 0.66 - 1.25 mg/dL    GFR Estimate 83 >60 mL/min/[1.73_m2]    GFR Estimate If Black >90 >60 mL/min/[1.73_m2]    Calcium 9.5 8.5 - 10.1 mg/dL   CBC with platelets    Collection Time: 10/07/19  1:57 PM   Result Value Ref Range    WBC 7.7 4.0 - 11.0 10e9/L    RBC Count 3.74 (L) 4.4 - 5.9 10e12/L    Hemoglobin 13.7 13.3 - 17.7 g/dL    Hematocrit 40.3 40.0 - 53.0 %     (H) 78 - 100 fl    MCH 36.6 (H) 26.5 - 33.0 pg    MCHC 34.0 31.5 - 36.5 g/dL    RDW 13.4 10.0 - 15.0 %    Platelet Count 194 150 - 450 10e9/L      IMPRESSION:  Mr. Osborne is more than 3 years status post liver transplantation.  His liver function is excellent, his kidney function is normal, and his platelet counts are as well.  I will not be making any change to his medical regimen.  I will see him back in the clinic again in 6 months.  He will get a flu shot today and then will be up to date on his vaccinations.  His pretransplant bone density study was completely normal, and he does not need a followup there.  He will get a repeat colonoscopy in 3 years.      Thank you very much for allowing me to participate in the care of this patient.  If you have any questions regarding my recommendations, please do not hesitate to contact me.       Juan Simms MD      Professor of Medicine  [employer]      Executive Medical Director, Solid Organ Transplant Program  [facility]     Juan Simms MD

## 2019-10-07 NOTE — NURSING NOTE
Chief Complaint   Patient presents with     RECHECK     6 mos follow up     BP (!) 148/92 (BP Location: Left arm, Patient Position: Sitting, Cuff Size: Adult Large)   Pulse 70   Temp 98.7  F (37.1  C)   Wt 137.5 kg (303 lb 3.2 oz)   SpO2 97%   BMI 41.12 kg/m        Marlon Weber, EMT

## 2019-10-07 NOTE — LETTER
10/7/2019       RE: Blanco Osborne  [address] [place] [address] [place] [id number]     Dear Colleague,    Thank you for referring your patient, Blanco Osborne, to the [facility] HEPATOLOGY at [facility]. Please see a copy of my visit note below.    I had the pleasure of seeing Blanco Osborne for followup in the Liver Transplant Clinic at the [facility] on 10/07/2019.  Mr. 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      He is doing fairly well at this visit.  He denies any abdominal pain, itching or skin rash.  He has a moderate amount of fatigue.  He denies any increased abdominal girth or lower extremity edema.  He has not had any gastrointestinal bleeding.      He denies any fevers or chills.  He does have a cough that has been going on for about a week.  The cough is nonproductive.  There is no shortness of breath.  He denies any nausea or vomiting, diarrhea or constipation.  His appetite has been good, and he is trying to lose some weight.      He did have a colonoscopy on Friday that showed 2 polyps.  One was a 1.3 cm polyp taken from the cecum that was not recovered, and the other was a 3 mm polyp, and the path is still pending on that specimen.     Current Outpatient Medications   Medication     acetaminophen (TYLENOL) 500 MG tablet     amLODIPine (NORVASC) 5 MG tablet     aspirin 81 MG tablet     atenolol (TENORMIN) 50 MG tablet     Cholecalciferol (VITAMIN D3) 5000 UNITS TBDP     multivitamin  with lutein (OCUVITE WITH LTEIN) CAPS per capsule     tacrolimus (GENERIC EQUIVALENT) 1 MG capsule     order for DME     order for DME     ursodiol (ACTIGALL) 300 MG capsule     Current Facility-Administered Medications   Medication     influenza recomb quadrivalent PF (FLUBLOK) injection 0.5 mL     BP (!) 148/92 (BP Location: Left arm, Patient  Position: Sitting, Cuff Size: Adult Large)   Pulse 70   Temp 98.7  F (37.1  C)   Wt 137.5 kg (303 lb 3.2 oz)   SpO2 97%   BMI 41.12 kg/m       PHYSICAL EXAMINATION:  In general, he looks well.  HEENT exam shows no scleral icterus or temporal muscle wasting.  His chest is clear.  His abdominal exam shows no increase in girth.  No masses or tenderness to palpation is present.  His liver is 10 cm in span without left lobe enlargement.  No spleen tip is palpable, and extremity exam shows no edema.  Skin exam shows no stigmata of chronic liver disease.  Neurologic exam shows no asterixis.     Recent Results (from the past 168 hour(s))   COLONOSCOPY    Collection Time: 10/04/19 12:40 PM   Result Value Ref Range    COLONOSCOPY       [facility]  [address] [place] [id number] [phone]     Endoscopy Department  _______________________________________________________________________________  Patient Name: Blanco Osborne          Procedure Date: 10/4/2019 12:40 PM  MRN: [id number]                       Account Number: [id number]  YOB: 1965              Admit Type: Outpatient  Age: 54                                Gender: Male  Note Status: Finalized                Attending MD: Go Chong ,   Pause for the Cause: Time out was completed. Total Sedation Time:   _______________________________________________________________________________     Procedure:           Colonoscopy  Indications:         High risk colon cancer surveillance: Personal history of                        colonic polyps  Providers:           Go Chong, Stephania White RN  Patient Profile:     This is a 54 year old male with liver transplant 2016                        who presents for  surveillance colonoscopy. Asymptomatic                        without melena or hematochezia. Last colonoscopy was                        2013 with one small tubular adenoma.  Referring MD:        Juan Simms,  MD  Medicines:           Monitored Anesthesia Care  Complications:       No immediate complications.  _______________________________________________________________________________  Procedure:           Pre-Anesthesia Assessment:                       - Prior to the procedure, a History and Physical was                        performed, and patient medications and allergies were                        reviewed. The patient is competent. The risks and                        benefits of the procedure and the sedation options and                        risks were discussed with the patient. All questions                        were answered and informed consent was obtained. Patient                        identification and proposed procedure were verified by                        t  physician in the pre-procedure area. Mental Status                        Examination: alert and oriented. Airway Examination:                        normal oropharyngeal airway and neck mobility.                        Respiratory Examination: clear to auscultation. CV                        Examination: normal. ASA Grade Assessment: III - A                        patient with severe systemic disease. After reviewing                        the risks and benefits, the patient was deemed in                        satisfactory condition to undergo the procedure. The                        anesthesia plan was to use moderate sedation / analgesia                        (conscious sedation). Immediately prior to                        administration of medications, the patient was                        re-assessed for adequacy to receive sedatives. The heart                        rate, respiratory rate, oxygen saturations, blood                        pressure, adequacy of pulmonary ve ntilation, and                        response to care were monitored throughout the                        procedure. The physical status of the patient  was                        re-assessed after the procedure.                       After obtaining informed consent, the colonoscope was                        passed under direct vision. Throughout the procedure,                        the patient's blood pressure, pulse, and oxygen                        saturations were monitored continuously. The Colonoscope                        was introduced through the anus and advanced to the                        terminal ileum. The colonoscopy was performed without                        difficulty. The patient tolerated the procedure well.                        The quality of the bowel preparation was good.                                                                                   Findings:       The perianal and digital rectal examinations were normal.       A 13 mm polyp was fou nd in the cecum. The polyp was pedunculated. The        polyp was removed with a saline injection-lift technique using a hot        snare. Resection was complete, but the polyp tissue was not retrieved as        the trap was not on. To prevent bleeding post-intervention, one        hemostatic clip was successfully placed. There was no bleeding during,        or at the end, of the procedure.       A 3 mm polyp was found in the transverse colon. The polyp was sessile.        The polyp was removed with a cold snare. Resection and retrieval were        complete.       Internal hemorrhoids were found during retroflexion. The hemorrhoids        were moderate.       Retroflexion in the right colon was performed.       The terminal ileum appeared normal.       The exam was otherwise without abnormality on direct and retroflexion        views.                                                                                   Impression:          One large (13mm) pedunculated cecal polyp was rese cted                        with hot snare after lifting and clipped at base but                         unfortunately was not retrieved as polyp trap was not                        on. One small transverse polyp was resected with cold                        snare.                       - One 13 mm polyp in the cecum, removed using                        injection-lift and a hot snare. Complete resection.                        Polyp tissue not retrieved.                       - One 3 mm polyp in the transverse colon, removed with a                        cold snare. Resected and retrieved.                       - The examined portion of the ileum was normal.                       - The examination was otherwise normal on direct and                        retroflexion views.                       - Internal hemorrhoids.  Recommendation:      - Discharge patient to home (with escort).                       - Resume previous diet.                       - Await pathology results.                        - Repeat colonoscopy for surveillance based on pathology                        results.                                                                                     Electronically signed by: Go Chong MD  ____________________  Go Chong,   10/4/2019 2:05:22 PM  I was physically present for the entire viewing portion of the exam.  __________________________  Signature of teaching physician  Go Chong  Number of Addenda: 0    Note Initiated On: 10/4/2019 12:40 PM  Scope In:  Scope Out:     INR    Collection Time: 10/07/19  1:57 PM   Result Value Ref Range    INR 1.20 (H) 0.86 - 1.14   Hepatic panel    Collection Time: 10/07/19  1:57 PM   Result Value Ref Range    Bilirubin Direct 0.6 (H) 0.0 - 0.2 mg/dL    Bilirubin Total 1.3 0.2 - 1.3 mg/dL    Albumin 3.8 3.4 - 5.0 g/dL    Protein Total 8.0 6.8 - 8.8 g/dL    Alkaline Phosphatase 185 (H) 40 - 150 U/L    ALT 52 0 - 70 U/L    AST 59 (H) 0 - 45 U/L   Basic metabolic panel    Collection Time: 10/07/19  1:57 PM   Result Value Ref Range    Sodium 131 (L)  133 - 144 mmol/L    Potassium 4.3 3.4 - 5.3 mmol/L    Chloride 99 94 - 109 mmol/L    Carbon Dioxide 26 20 - 32 mmol/L    Anion Gap 6 3 - 14 mmol/L    Glucose 102 (H) 70 - 99 mg/dL    Urea Nitrogen 18 7 - 30 mg/dL    Creatinine 1.01 0.66 - 1.25 mg/dL    GFR Estimate 83 >60 mL/min/[1.73_m2]    GFR Estimate If Black >90 >60 mL/min/[1.73_m2]    Calcium 9.5 8.5 - 10.1 mg/dL   CBC with platelets    Collection Time: 10/07/19  1:57 PM   Result Value Ref Range    WBC 7.7 4.0 - 11.0 10e9/L    RBC Count 3.74 (L) 4.4 - 5.9 10e12/L    Hemoglobin 13.7 13.3 - 17.7 g/dL    Hematocrit 40.3 40.0 - 53.0 %     (H) 78 - 100 fl    MCH 36.6 (H) 26.5 - 33.0 pg    MCHC 34.0 31.5 - 36.5 g/dL    RDW 13.4 10.0 - 15.0 %    Platelet Count 194 150 - 450 10e9/L      IMPRESSION:  Mr. Osborne is more than 3 years status post liver transplantation.  His liver function is excellent, his kidney function is normal, and his platelet counts are as well.  I will not be making any change to his medical regimen.  I will see him back in the clinic again in 6 months.  He will get a flu shot today and then will be up to date on his vaccinations.  His pretransplant bone density study was completely normal, and he does not need a followup there.  He will get a repeat colonoscopy in 3 years.      Thank you very much for allowing me to participate in the care of this patient.  If you have any questions regarding my recommendations, please do not hesitate to contact me.       Juan Simms MD      Professor of Medicine  [employer]      Executive Medical Director, Solid Organ Transplant Program  [facility]     Again, thank you for allowing me to participate in the care of your patient.      Sincerely,    Juan Simms MD

## 2019-10-08 LAB
COPATH REPORT: NORMAL
TACROLIMUS BLD-MCNC: 4.6 UG/L (ref 5–15)
TME LAST DOSE: ABNORMAL H

## 2019-11-14 DIAGNOSIS — I10 BENIGN ESSENTIAL HYPERTENSION: ICD-10-CM

## 2019-11-14 RX ORDER — ATENOLOL 50 MG/1
TABLET ORAL
Qty: 90 TABLET | Refills: 0 | Status: SHIPPED | OUTPATIENT
Start: 2019-11-14 | End: 2020-06-03

## 2019-11-14 RX ORDER — AMLODIPINE BESYLATE 5 MG/1
TABLET ORAL
Qty: 90 TABLET | Refills: 0 | Status: SHIPPED | OUTPATIENT
Start: 2019-11-14 | End: 2020-06-03

## 2019-12-13 NOTE — PROGRESS NOTES
I had the pleasure of seeing Blanco Osborne for followup in the Liver Transplant Clinic at the [facility] on 10/07/2019.  Mr. 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      He is doing fairly well at this visit.  He denies any abdominal pain, itching or skin rash.  He has a moderate amount of fatigue.  He denies any increased abdominal girth or lower extremity edema.  He has not had any gastrointestinal bleeding.      He denies any fevers or chills.  He does have a cough that has been going on for about a week.  The cough is nonproductive.  There is no shortness of breath.  He denies any nausea or vomiting, diarrhea or constipation.  His appetite has been good, and he is trying to lose some weight.      He did have a colonoscopy on Friday that showed 2 polyps.  One was a 1.3 cm polyp taken from the cecum that was not recovered, and the other was a 3 mm polyp, and the path is still pending on that specimen.     Current Outpatient Medications   Medication     acetaminophen (TYLENOL) 500 MG tablet     amLODIPine (NORVASC) 5 MG tablet     aspirin 81 MG tablet     atenolol (TENORMIN) 50 MG tablet     Cholecalciferol (VITAMIN D3) 5000 UNITS TBDP     multivitamin  with lutein (OCUVITE WITH LTEIN) CAPS per capsule     tacrolimus (GENERIC EQUIVALENT) 1 MG capsule     order for DME     order for DME     ursodiol (ACTIGALL) 300 MG capsule     Current Facility-Administered Medications   Medication     influenza recomb quadrivalent PF (FLUBLOK) injection 0.5 mL     BP (!) 148/92 (BP Location: Left arm, Patient Position: Sitting, Cuff Size: Adult Large)   Pulse 70   Temp 98.7  F (37.1  C)   Wt 137.5 kg (303 lb 3.2 oz)   SpO2 97%   BMI 41.12 kg/m      PHYSICAL EXAMINATION:  In general, he looks well.  HEENT exam shows no scleral icterus or temporal muscle wasting.  His chest is clear.  His  abdominal exam shows no increase in girth.  No masses or tenderness to palpation is present.  His liver is 10 cm in span without left lobe enlargement.  No spleen tip is palpable, and extremity exam shows no edema.  Skin exam shows no stigmata of chronic liver disease.  Neurologic exam shows no asterixis.     Recent Results (from the past 168 hour(s))   COLONOSCOPY    Collection Time: 10/04/19 12:40 PM   Result Value Ref Range    COLONOSCOPY       [facility]  [address] [place], [place] [address]., [place] [address] [phone]     Endoscopy Department  _______________________________________________________________________________  Patient Name: Blanco Osborne          Procedure Date: 10/4/2019 12:40 PM  MRN: [id number]                       Account Number: [id number]  YOB: 1965              Admit Type: Outpatient  Age: 54                                Gender: Male  Note Status: Finalized                Attending MD: Richard Sanderson for the Cause: Time out was completed. Total Sedation Time:   _______________________________________________________________________________     Procedure:           Colonoscopy  Indications:         High risk colon cancer surveillance: Personal history of                        colonic polyps  Providers:           Go Chong, Stephania White RN  Patient Profile:     This is a 54 year old male with liver transplant 2016                        who presents for  surveillance colonoscopy. Asymptomatic                        without melena or hematochezia. Last colonoscopy was                        2013 with one small tubular adenoma.  Referring MD:        Juan Simms MD  Medicines:           Monitored Anesthesia Care  Complications:       No immediate complications.  _______________________________________________________________________________  Procedure:           Pre-Anesthesia Assessment:                       - Prior to the procedure, a History  and Physical was                        performed, and patient medications and allergies were                        reviewed. The patient is competent. The risks and                        benefits of the procedure and the sedation options and                        risks were discussed with the patient. All questions                        were answered and informed consent was obtained. Patient                        identification and proposed procedure were verified by                        t he physician in the pre-procedure area. Mental Status                        Examination: alert and oriented. Airway Examination:                        normal oropharyngeal airway and neck mobility.                        Respiratory Examination: clear to auscultation. CV                        Examination: normal. ASA Grade Assessment: III - A                        patient with severe systemic disease. After reviewing                        the risks and benefits, the patient was deemed in                        satisfactory condition to undergo the procedure. The                        anesthesia plan was to use moderate sedation / analgesia                        (conscious sedation). Immediately prior to                        administration of medications, the patient was                        re-assessed for adequacy to receive sedatives. The heart                        rate, respiratory rate, oxygen saturations, blood                        pressure, adequacy of pulmonary ve ntilation, and                        response to care were monitored throughout the                        procedure. The physical status of the patient was                        re-assessed after the procedure.                       After obtaining informed consent, the colonoscope was                        passed under direct vision. Throughout the procedure,                        the patient's blood pressure, pulse, and oxygen                         saturations were monitored continuously. The Colonoscope                        was introduced through the anus and advanced to the                        terminal ileum. The colonoscopy was performed without                        difficulty. The patient tolerated the procedure well.                        The quality of the bowel preparation was good.                                                                                   Findings:       The perianal and digital rectal examinations were normal.       A 13 mm polyp was fou nd in the cecum. The polyp was pedunculated. The        polyp was removed with a saline injection-lift technique using a hot        snare. Resection was complete, but the polyp tissue was not retrieved as        the trap was not on. To prevent bleeding post-intervention, one        hemostatic clip was successfully placed. There was no bleeding during,        or at the end, of the procedure.       A 3 mm polyp was found in the transverse colon. The polyp was sessile.        The polyp was removed with a cold snare. Resection and retrieval were        complete.       Internal hemorrhoids were found during retroflexion. The hemorrhoids        were moderate.       Retroflexion in the right colon was performed.       The terminal ileum appeared normal.       The exam was otherwise without abnormality on direct and retroflexion        views.                                                                                   Impression:          One large (13mm) pedunculated cecal polyp was rese cted                        with hot snare after lifting and clipped at base but                        unfortunately was not retrieved as polyp trap was not                        on. One small transverse polyp was resected with cold                        snare.                       - One 13 mm polyp in the cecum, removed using                        injection-lift and a hot snare.  Complete resection.                        Polyp tissue not retrieved.                       - One 3 mm polyp in the transverse colon, removed with a                        cold snare. Resected and retrieved.                       - The examined portion of the ileum was normal.                       - The examination was otherwise normal on direct and                        retroflexion views.                       - Internal hemorrhoids.  Recommendation:      - Discharge patient to home (with escort).                       - Resume previous diet.                       - Await pathology results.                        - Repeat colonoscopy for surveillance based on pathology                        results.                                                                                     Electronically signed by: Go Chong MD  ____________________  Go Chong,   10/4/2019 2:05:22 PM  I was physically present for the entire viewing portion of the exam.  __________________________  Signature of teaching physician  Go Chong  Number of Addenda: 0    Note Initiated On: 10/4/2019 12:40 PM  Scope In:  Scope Out:     INR    Collection Time: 10/07/19  1:57 PM   Result Value Ref Range    INR 1.20 (H) 0.86 - 1.14   Hepatic panel    Collection Time: 10/07/19  1:57 PM   Result Value Ref Range    Bilirubin Direct 0.6 (H) 0.0 - 0.2 mg/dL    Bilirubin Total 1.3 0.2 - 1.3 mg/dL    Albumin 3.8 3.4 - 5.0 g/dL    Protein Total 8.0 6.8 - 8.8 g/dL    Alkaline Phosphatase 185 (H) 40 - 150 U/L    ALT 52 0 - 70 U/L    AST 59 (H) 0 - 45 U/L   Basic metabolic panel    Collection Time: 10/07/19  1:57 PM   Result Value Ref Range    Sodium 131 (L) 133 - 144 mmol/L    Potassium 4.3 3.4 - 5.3 mmol/L    Chloride 99 94 - 109 mmol/L    Carbon Dioxide 26 20 - 32 mmol/L    Anion Gap 6 3 - 14 mmol/L    Glucose 102 (H) 70 - 99 mg/dL    Urea Nitrogen 18 7 - 30 mg/dL    Creatinine 1.01 0.66 - 1.25 mg/dL    GFR Estimate 83 >60  mL/min/[1.73_m2]    GFR Estimate If Black >90 >60 mL/min/[1.73_m2]    Calcium 9.5 8.5 - 10.1 mg/dL   CBC with platelets    Collection Time: 10/07/19  1:57 PM   Result Value Ref Range    WBC 7.7 4.0 - 11.0 10e9/L    RBC Count 3.74 (L) 4.4 - 5.9 10e12/L    Hemoglobin 13.7 13.3 - 17.7 g/dL    Hematocrit 40.3 40.0 - 53.0 %     (H) 78 - 100 fl    MCH 36.6 (H) 26.5 - 33.0 pg    MCHC 34.0 31.5 - 36.5 g/dL    RDW 13.4 10.0 - 15.0 %    Platelet Count 194 150 - 450 10e9/L      IMPRESSION:  Mr. Osborne is more than 3 years status post liver transplantation.  His liver function is excellent, his kidney function is normal, and his platelet counts are as well.  I will not be making any change to his medical regimen.  I will see him back in the clinic again in 6 months.  He will get a flu shot today and then will be up to date on his vaccinations.  His pretransplant bone density study was completely normal, and he does not need a followup there.  He will get a repeat colonoscopy in 3 years.      Thank you very much for allowing me to participate in the care of this patient.  If you have any questions regarding my recommendations, please do not hesitate to contact me.       Juan Simms MD      Professor of Medicine  [employer]      Executive Medical Director, Solid Organ Transplant Program  [facility]    No symptoms

## 2019-12-15 ENCOUNTER — HEALTH MAINTENANCE LETTER (OUTPATIENT)
Age: 54
End: 2019-12-15

## 2020-04-20 ENCOUNTER — TELEPHONE (OUTPATIENT)
Dept: TRANSPLANT | Facility: CLINIC | Age: 55
End: 2020-04-20

## 2020-04-20 DIAGNOSIS — Z13.220 LIPID SCREENING: ICD-10-CM

## 2020-04-20 DIAGNOSIS — Z94.4 LIVER REPLACED BY TRANSPLANT (H): ICD-10-CM

## 2020-04-20 LAB
ALBUMIN SERPL-MCNC: 3.9 G/DL (ref 3.4–5)
ALP SERPL-CCNC: 189 U/L (ref 40–150)
ALT SERPL W P-5'-P-CCNC: 72 U/L (ref 0–70)
ANION GAP SERPL CALCULATED.3IONS-SCNC: 6 MMOL/L (ref 3–14)
AST SERPL W P-5'-P-CCNC: 107 U/L (ref 0–45)
BILIRUB DIRECT SERPL-MCNC: 0.4 MG/DL (ref 0–0.2)
BILIRUB SERPL-MCNC: 1 MG/DL (ref 0.2–1.3)
BUN SERPL-MCNC: 23 MG/DL (ref 7–30)
CALCIUM SERPL-MCNC: 9.2 MG/DL (ref 8.5–10.1)
CHLORIDE SERPL-SCNC: 106 MMOL/L (ref 94–109)
CHOLEST SERPL-MCNC: 161 MG/DL
CO2 SERPL-SCNC: 28 MMOL/L (ref 20–32)
CREAT SERPL-MCNC: 1.06 MG/DL (ref 0.66–1.25)
ERYTHROCYTE [DISTWIDTH] IN BLOOD BY AUTOMATED COUNT: 12.7 % (ref 10–15)
GFR SERPL CREATININE-BSD FRML MDRD: 78 ML/MIN/{1.73_M2}
GLUCOSE SERPL-MCNC: 93 MG/DL (ref 70–99)
HCT VFR BLD AUTO: 43.1 % (ref 40–53)
HDLC SERPL-MCNC: 42 MG/DL
HGB BLD-MCNC: 14.3 G/DL (ref 13.3–17.7)
LDLC SERPL CALC-MCNC: 81 MG/DL
MCH RBC QN AUTO: 36.2 PG (ref 26.5–33)
MCHC RBC AUTO-ENTMCNC: 33.2 G/DL (ref 31.5–36.5)
MCV RBC AUTO: 109 FL (ref 78–100)
NONHDLC SERPL-MCNC: 119 MG/DL
PLATELET # BLD AUTO: 108 10E9/L (ref 150–450)
POTASSIUM SERPL-SCNC: 3.9 MMOL/L (ref 3.4–5.3)
PROT SERPL-MCNC: 7.9 G/DL (ref 6.8–8.8)
RBC # BLD AUTO: 3.95 10E12/L (ref 4.4–5.9)
SODIUM SERPL-SCNC: 139 MMOL/L (ref 133–144)
TACROLIMUS BLD-MCNC: 5.5 UG/L (ref 5–15)
TME LAST DOSE: NORMAL H
TRIGL SERPL-MCNC: 192 MG/DL
WBC # BLD AUTO: 4 10E9/L (ref 4–11)

## 2020-04-20 PROCEDURE — 80197 ASSAY OF TACROLIMUS: CPT | Performed by: INTERNAL MEDICINE

## 2020-04-20 NOTE — TELEPHONE ENCOUNTER
"Call to Blanco to talk about his elevated lab tests.  He admits that he \"put on a few pounds\" over the winter, he has lost 10-15 pounds in the last 1-2 mos.  We talked about good food choices and the importance of exercise.  He also mentioned that \"on occasion\" he may miss a dose of prograf.  Reminded him of the importance of bid dosing.  He has a video visit w/ Dr. Simms tomorrow.  I forwarded Blanco's results to Dr. Simms.  "

## 2020-04-21 ENCOUNTER — VIRTUAL VISIT (OUTPATIENT)
Dept: GASTROENTEROLOGY | Facility: CLINIC | Age: 55
End: 2020-04-21
Attending: INTERNAL MEDICINE
Payer: COMMERCIAL

## 2020-04-21 DIAGNOSIS — Z94.4 LIVER REPLACED BY TRANSPLANT (H): Primary | ICD-10-CM

## 2020-04-21 ASSESSMENT — PAIN SCALES - GENERAL: PAINLEVEL: NO PAIN (0)

## 2020-04-21 NOTE — PROGRESS NOTES
"Blanco Osborne is a 54 year old male who is being evaluated via a billable video visit.      The patient has been notified of following:     \"This video visit will be conducted via a call between you and your physician/provider. We have found that certain health care needs can be provided without the need for an in-person physical exam.  This service lets us provide the care you need with a video conversation.  If a prescription is necessary we can send it directly to your pharmacy.  If lab work is needed we can place an order for that and you can then stop by our lab to have the test done at a later time.    Video visits are billed at different rates depending on your insurance coverage.  Please reach out to your insurance provider with any questions.    If during the course of the call the physician/provider feels a video visit is not appropriate, you will not be charged for this service.\"    Patient has given verbal consent for Video visit? Yes    How would you like to obtain your AVS? Vikki    Patient would like the video invitation sent by: Send to e-mail at: [email or web address]      Subjective     Blanco Osborne is a 54 year old male who presents to clinic today for the following health issues: S/P liver transplantation    Video Start Time: 11:45 AM    Mr. 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      He is doing fairly well at this visit.  He denies any abdominal pain, itching or skin rash.  He has a moderate amount of fatigue.  He denies any increased abdominal girth or lower extremity edema.  He has not had any gastrointestinal bleeding.      He denies any fevers or chills.  He does have a cough that has been going on for about a week.  The cough is nonproductive.  There is no shortness of breath.  He denies any nausea or vomiting, diarrhea or constipation.  His appetite has been good, and he " is trying to lose some weight; he is now down 12 #.    There has not been any new events since he was last seen.     Histories reviewed and updated as needed this visit by Provider    Current Outpatient Medications   Medication     acetaminophen (TYLENOL) 500 MG tablet     amLODIPine (NORVASC) 5 MG tablet     aspirin 81 MG tablet     atenolol (TENORMIN) 50 MG tablet     Cholecalciferol (VITAMIN D3) 5000 UNITS TBDP     multivitamin  with lutein (OCUVITE WITH LTEIN) CAPS per capsule     tacrolimus (GENERIC EQUIVALENT) 1 MG capsule     order for DME     order for DME     ursodiol (ACTIGALL) 300 MG capsule     No current facility-administered medications for this visit.      Objective    There were no vitals taken for this visit.  Estimated body mass index is 41.12 kg/m  as calculated from the following:    Height as of 10/4/19: 1.829 m (6').    Weight as of 10/7/19: 137.5 kg (303 lb 3.2 oz).    GENERAL: healthy, alert and no distress, EYES: Eyes grossly normal to inspection, conjunctivae and sclerae normal, RESP: no audible wheeze, cough, or visible cyanosis.  No visible retractions or increased work of breathing.  Able to speak fully in complete sentences., NEURO: Cranial nerves grossly intact, mentation intact and speech normal, PSYCH: mentation appears normal, affect normal/bright, judgement and insight intact, normal speech and appearance well-groomed    Recent Results (from the past 168 hour(s))   Lipid Profile    Collection Time: 04/20/20 11:57 AM   Result Value Ref Range    Cholesterol 161 <200 mg/dL    Triglycerides 192 (H) <150 mg/dL    HDL Cholesterol 42 >39 mg/dL    LDL Cholesterol Calculated 81 <100 mg/dL    Non HDL Cholesterol 119 <130 mg/dL   Tacrolimus level    Collection Time: 04/20/20 11:57 AM   Result Value Ref Range    Tacrolimus Last Dose 4/19/20 2000     Tacrolimus Level 5.5 5.0 - 15.0 ug/L   Hepatic panel    Collection Time: 04/20/20 11:57 AM   Result Value Ref Range    Bilirubin Direct 0.4 (H)  0.0 - 0.2 mg/dL    Bilirubin Total 1.0 0.2 - 1.3 mg/dL    Albumin 3.9 3.4 - 5.0 g/dL    Protein Total 7.9 6.8 - 8.8 g/dL    Alkaline Phosphatase 189 (H) 40 - 150 U/L    ALT 72 (H) 0 - 70 U/L     (H) 0 - 45 U/L   Basic metabolic panel    Collection Time: 04/20/20 11:57 AM   Result Value Ref Range    Sodium 139 133 - 144 mmol/L    Potassium 3.9 3.4 - 5.3 mmol/L    Chloride 106 94 - 109 mmol/L    Carbon Dioxide 28 20 - 32 mmol/L    Anion Gap 6 3 - 14 mmol/L    Glucose 93 70 - 99 mg/dL    Urea Nitrogen 23 7 - 30 mg/dL    Creatinine 1.06 0.66 - 1.25 mg/dL    GFR Estimate 78 >60 mL/min/[1.73_m2]    GFR Estimate If Black >90 >60 mL/min/[1.73_m2]    Calcium 9.2 8.5 - 10.1 mg/dL   CBC with platelets    Collection Time: 04/20/20 11:57 AM   Result Value Ref Range    WBC 4.0 4.0 - 11.0 10e9/L    RBC Count 3.95 (L) 4.4 - 5.9 10e12/L    Hemoglobin 14.3 13.3 - 17.7 g/dL    Hematocrit 43.1 40.0 - 53.0 %     (H) 78 - 100 fl    MCH 36.2 (H) 26.5 - 33.0 pg    MCHC 33.2 31.5 - 36.5 g/dL    RDW 12.7 10.0 - 15.0 %    Platelet Count 108 (L) 150 - 450 10e9/L      IMPRESSION:  Mr. Osborne is almost 4 years status post liver transplantation.  His liver function is good, his kidney function is normal, and his platelet count is as well.  I will not be making any change to his medical regimen.  I will see him back in the clinic again in 6 months.  He is up to date on his vaccinations and cancer screening      Thank you very much for allowing me to participate in the care of this patient.  If you have any questions regarding my recommendations, please do not hesitate to contact me.         Juan Simms MD      Professor of Medicine  [employer]      Executive Medical Director, Solid Organ Transplant Program  [facility]       Video-Visit Details    Type of service:  Video Visit    Video End Time 12:05 PM    Originating Location (pt. Location): Home    Distant Location  (provider location):  [facility] HEPATOLOGY     Mode of Communication:  Video Conference via [employer]

## 2020-06-02 DIAGNOSIS — Z94.4 LIVER TRANSPLANTED (H): ICD-10-CM

## 2020-06-02 DIAGNOSIS — I10 BENIGN ESSENTIAL HYPERTENSION: ICD-10-CM

## 2020-06-03 RX ORDER — AMLODIPINE BESYLATE 5 MG/1
TABLET ORAL
Qty: 90 TABLET | Refills: 0 | Status: SHIPPED | OUTPATIENT
Start: 2020-06-03 | End: 2020-07-31

## 2020-06-03 RX ORDER — TACROLIMUS 1 MG/1
CAPSULE ORAL
Qty: 270 CAPSULE | Refills: 3 | Status: ON HOLD | OUTPATIENT
Start: 2020-06-03 | End: 2022-12-15

## 2020-06-03 RX ORDER — ATENOLOL 50 MG/1
TABLET ORAL
Qty: 90 TABLET | Refills: 0 | Status: SHIPPED | OUTPATIENT
Start: 2020-06-03 | End: 2020-07-31

## 2020-07-30 DIAGNOSIS — I10 BENIGN ESSENTIAL HYPERTENSION: ICD-10-CM

## 2020-07-31 RX ORDER — ATENOLOL 50 MG/1
TABLET ORAL
Qty: 90 TABLET | Refills: 0 | Status: SHIPPED | OUTPATIENT
Start: 2020-07-31 | End: 2022-11-12

## 2020-07-31 RX ORDER — AMLODIPINE BESYLATE 5 MG/1
TABLET ORAL
Qty: 90 TABLET | Refills: 0 | Status: ON HOLD | OUTPATIENT
Start: 2020-07-31 | End: 2022-12-15

## 2020-08-03 ENCOUNTER — TELEPHONE (OUTPATIENT)
Dept: GASTROENTEROLOGY | Facility: CLINIC | Age: 55
End: 2020-08-03

## 2020-08-03 ENCOUNTER — TELEPHONE (OUTPATIENT)
Dept: TRANSPLANT | Facility: CLINIC | Age: 55
End: 2020-08-03

## 2020-08-03 NOTE — TELEPHONE ENCOUNTER
[facility] Call Center    Phone Message    May a detailed message be left on voicemail: yes     Reason for Call: Symptoms or Concerns     If patient has red-flag symptoms, warm transfer to triage line    Current symptom or concern: sore throat, SOB, dizzy, lightheaded, weak    Symptoms have been present for:  1 month(s) - progressively worse over the last month    Has patient previously been seen for this? Yes    By : [facility]    Date: Prior to his liver Tx    Are there any new or worsening symptoms? No - Pt is getting a Covid test 8/4.  He is hoping to be seen in clinic as he also feels it is possibly esophogeal varocies.  He is also wondering if Dr. Simms would want him to have a chest CT.  Please call him back to discuss      Action Taken: Message routed to:  [facility]    Travel Screening: Not Applicable

## 2020-08-03 NOTE — TELEPHONE ENCOUNTER
Patient Call: Voicemail  Date/Time: 8/3/20 / 3:03 pm  Reason for call: request a visit with Dr. Juan Simms and should get labs done. Having some symptoms that are not right. Please call him back.

## 2020-08-03 NOTE — TELEPHONE ENCOUNTER
Call to Blanco.  Blanco tells me that he has been getting short of breath, has had some acid reflux - his throat also feels scratchy.  He is weak and tired.  No fever. No achiness.I suggested he see his PCP and get labs.  He will do.

## 2020-08-05 ENCOUNTER — TELEPHONE (OUTPATIENT)
Dept: TRANSPLANT | Facility: CLINIC | Age: 55
End: 2020-08-05

## 2020-08-05 NOTE — TELEPHONE ENCOUNTER
Provider Call: Transplant Lab/Orders  Route to LPN  Post Transplant Days: 1415  When patient is less than 60 days post-transplant, route high priority  Reason for Call: Annual lab reorder  Liver patients reporting abnormal lab results: Route to RN and Page  Document lab facility information when provider is calling about annual lab orders. Delete facility wildcards when not needed.  Facility Name: [facility]  Facility Location: [facility] Fax Number: [phone]  Callback needed? No

## 2020-08-05 NOTE — LETTER
OUTPATIENT OR TRANSITIONAL CARE  LABORATORY TEST ORDER  [facility] fax [phone]    Patient Name: Blanco Osborne  Transplant Date: 9/20/2016   YOB: 1965  Issue Date: 08/05/20   [facility] MR#: [id number]  Expiration Date: 08/05/21      Diagnoses: [x]      Liver Transplant (ICD-10 Z94.4)    [x]      Long term use of medications (ICD-10 Z79.899)     Please fax results to [phone]    Frequency: q 2-3 mos and prn        [x] CBC with Platelets   [x] Basic Metabolic Panel (Sodium, Potassium, Chloride, CO2, Creatinine, Urea Nitrogen, Glucose, Calcium)  [x] Hepatic panel (Albumin, Alk Phos, ALT, AST, Direct and Total Bili)  [x] Tacrolimus drug level - 12 hour trough, please document time of last dose   [] Cyclosporine drug level - 12 hour trough, please document time of last dose  [] Rapamune drug level - 24 hour trough, please document time of last dose  [] Everolimus drug level - 12 hour trough, please document time of last dose    Other Frequency: annually fall of 2020  [x]      Fasting lipid panel  [x] Random urine protein  [x] Urinalysis with reflex to micro    If you have questions, please call [phone] or [phone].    .

## 2020-08-05 NOTE — TELEPHONE ENCOUNTER
Patient Call: Voicemail  Date/Time: 20 / 12:35 pm  Reason for call: Patient is at [facility] for blood draw, the standing order has  and needs new order.   [phone]

## 2020-09-16 DIAGNOSIS — Z94.4 LIVER REPLACED BY TRANSPLANT (H): ICD-10-CM

## 2020-09-16 DIAGNOSIS — Z13.220 LIPID SCREENING: ICD-10-CM

## 2020-10-07 ENCOUNTER — DOCUMENTATION ONLY (OUTPATIENT)
Dept: ONCOLOGY | Facility: CLINIC | Age: 55
End: 2020-10-07

## 2020-10-07 ENCOUNTER — TRANSCRIBE ORDERS (OUTPATIENT)
Dept: OTHER | Age: 55
End: 2020-10-07

## 2020-10-07 DIAGNOSIS — R91.8 LUNG MASS: Primary | ICD-10-CM

## 2020-10-07 DIAGNOSIS — J90 PLEURAL EFFUSION: ICD-10-CM

## 2020-10-07 NOTE — PROGRESS NOTES
Action 10.7.20 MJ   Action Taken Received message from Tamia to request all chest imaging from 5 years ago to present.    Requested the following  9.17.20 PET lung, [facility]  9.1.20 CT thorax, [place]  9.1.20 US thoracentesis, [place]  8.10.20 US thoracentesis, [place]  8.10.20 CXR, [place]  8.6.20 CXR, [place]  1.12.16 CXR, [facility]  11.9.15 CXR, [facility]     Action 10.9.20 MJ 7:37 AM   Action Taken Images pushed and available in PACS.  Still missing  8.10.20 CXR, [place]  8.6.20 CXR, [place]  1.12.16 CXR, [facility]  11.9.15 CXR, [facility]    Called [facility]- no answer, LVM.  Called [facility] and spoke with radiology- He will push images right now.   UPDATE: [facility] images in PACS now.      Action 10.9.20 MJ   Action Taken Pulled images from [facility]. All requested images in PACS

## 2020-10-08 NOTE — TELEPHONE ENCOUNTER
RECORDS STATUS - ALL OTHER DIAGNOSIS      RECORDS RECEIVED FROM: [facility]   Dx. pleural effusion, lung mass    DATE RECEIVED:    NOTES STATUS DETAILS   OFFICE NOTE from referring provider     OFFICE NOTE from medical oncologist     DISCHARGE SUMMARY from hospital     DISCHARGE REPORT from the ER     OPERATIVE REPORT     MEDICATION LIST EPIC    CLINICAL TRIAL TREATMENTS TO DATE     LABS     PATHOLOGY REPORTS Care Everywhere 2020 Pleural Fluid Right   (Case: [id number] ) @ [facility]     *Trackin     ANYTHING RELATED TO DIAGNOSIS     GENONOMIC TESTING     TYPE:     IMAGING (NEED IMAGES & REPORT)     CT SCANS Care everywhere - req 10/7  Images in PACS 10/8 [facility] imaging  9. CT thorax, [place]   MRI     MAMMO     ULTRASOUND Care everywhere - req 10/7  Images in PACS 10/8 [facility] imaging  8.10.20 US thoracentesis  20 US thoracentesis,   8.10.20 US thoracentesis,   PET Care everywhere - req 10/7  Images in PACS 10/8 [facility] imaging  2020     10/8/2020 2:18PM images from [facility] were requested on 10/7 by Malka; status: pending. - Amay   10/8/2020 2:41PM message received from Nurse about images from [facility]. Called [facility] chidi and left a message with their film library to push images, left my direct # for call back [phone]- amay     Records Requested  10/08/20 Amay   Facility  [facility]   [address] [place] [address]  Phone: [phone]  Fx. [phone]   Outcome Sent a fax for pathology slides to be mailed out:  2020 Pleural Fluid Right   (Case: [id number] )   *Trackin

## 2020-10-08 NOTE — TELEPHONE ENCOUNTER
ONCOLOGY INTAKE: Records Information      APPT INFORMATION: 10/12/20 - Saleem - Video  Referring provider:  FAHEEM Auguste  Referring provider s clinic:  [facility]  Reason for visit/diagnosis:  pleural effusion, lung mass  Has patient been notified of appointment date and time?: Yes    RECORDS INFORMATION:  Were the records received with the referral (via Rightfax)? No    Has patient been seen for any external appt for this diagnosis? Yes - [facility]    ADDITIONAL INFORMATION:  IB from Tamia/Odalis  I transcribed the order & scheduled via IB request from Tamia

## 2020-10-12 ENCOUNTER — PRE VISIT (OUTPATIENT)
Dept: PULMONOLOGY | Facility: CLINIC | Age: 55
End: 2020-10-12

## 2020-10-13 PROCEDURE — 88321 CONSLTJ&REPRT SLD PREP ELSWR: CPT | Performed by: PATHOLOGY

## 2020-10-13 PROCEDURE — 999N001032 HC STATISTIC REVIEW OUTSIDE SLIDES TC 88321: Performed by: INTERNAL MEDICINE

## 2020-10-14 LAB — COPATH REPORT: NORMAL

## 2020-10-15 NOTE — TELEPHONE ENCOUNTER
ONCOLOGY INTAKE: Records Information      APPT INFORMATION:  Referring provider:  Self  Referring provider s clinic:  Records at [facility]  Reason for visit/diagnosis:  Lung mass [R91.8]  Pleural effusion   Has patient been notified of appointment date and time?: Yes    RECORDS INFORMATION:  Were the records received with the referral (via [employer])? Yes    Has patient been seen for any external appt for this diagnosis? Yes    If yes, where? [facility]    Has patient had any imaging or procedures outside of [place] for this condition? Yes      If Yes, where? [facility]    ADDITIONAL INFORMATION:  None

## 2020-10-16 ENCOUNTER — DOCUMENTATION ONLY (OUTPATIENT)
Dept: TRANSPLANT | Facility: CLINIC | Age: 55
End: 2020-10-16

## 2020-10-21 ENCOUNTER — PRE VISIT (OUTPATIENT)
Dept: PULMONOLOGY | Facility: CLINIC | Age: 55
End: 2020-10-21

## 2020-11-11 DIAGNOSIS — I10 BENIGN ESSENTIAL HYPERTENSION: ICD-10-CM

## 2020-11-12 ENCOUNTER — TELEPHONE (OUTPATIENT)
Dept: TRANSPLANT | Facility: CLINIC | Age: 55
End: 2020-11-12

## 2020-11-12 RX ORDER — AMLODIPINE BESYLATE 5 MG/1
TABLET ORAL
Qty: 90 TABLET | Refills: 0 | OUTPATIENT
Start: 2020-11-12

## 2020-11-12 RX ORDER — ATENOLOL 50 MG/1
TABLET ORAL
Qty: 90 TABLET | Refills: 0 | OUTPATIENT
Start: 2020-11-12

## 2020-11-16 NOTE — TELEPHONE ENCOUNTER
Spoke to pt and asked that he get refills for his Bp meds through the PCP. Pt will call back if problems.

## 2021-01-15 ENCOUNTER — HEALTH MAINTENANCE LETTER (OUTPATIENT)
Age: 56
End: 2021-01-15

## 2021-09-04 ENCOUNTER — HEALTH MAINTENANCE LETTER (OUTPATIENT)
Age: 56
End: 2021-09-04

## 2021-10-21 DIAGNOSIS — Z94.4 LIVER REPLACED BY TRANSPLANT (H): ICD-10-CM

## 2021-10-21 DIAGNOSIS — Z13.220 LIPID SCREENING: ICD-10-CM

## 2021-10-22 ENCOUNTER — DOCUMENTATION ONLY (OUTPATIENT)
Dept: TRANSPLANT | Facility: CLINIC | Age: 56
End: 2021-10-22

## 2021-10-22 NOTE — PROGRESS NOTES
Annual chart review completed.      Pt is not up to date with post transplant follow-up.    Please send letter reminding to schedule an appointment to see a liver doctor (hepatologist) here every year and to have lab testing every 3 months.

## 2022-02-19 ENCOUNTER — HEALTH MAINTENANCE LETTER (OUTPATIENT)
Age: 57
End: 2022-02-19

## 2022-08-24 ENCOUNTER — DOCUMENTATION ONLY (OUTPATIENT)
Dept: TRANSPLANT | Facility: CLINIC | Age: 57
End: 2022-08-24

## 2022-08-24 ENCOUNTER — TELEPHONE (OUTPATIENT)
Dept: TRANSPLANT | Facility: CLINIC | Age: 57
End: 2022-08-24

## 2022-08-24 NOTE — TELEPHONE ENCOUNTER
Blanco is overdue for labs and follow-up.  Called him to check in.    No answer.  LM reminding him to have lab testing and make a follow up appt w/

## 2022-08-24 NOTE — PROGRESS NOTES
Annual chart review completed.      Blanco is not up to date with post transplant follow-up.  Overdue to labs and appt w/ Dr. Simms.     Please send letter reminding to schedule an appointment to see a liver doctor (hepatologist) here every year and to have lab testing every 3 months.

## 2022-10-16 ENCOUNTER — HEALTH MAINTENANCE LETTER (OUTPATIENT)
Age: 57
End: 2022-10-16

## 2022-10-26 DIAGNOSIS — Z13.220 LIPID SCREENING: ICD-10-CM

## 2022-10-26 DIAGNOSIS — Z94.4 LIVER REPLACED BY TRANSPLANT (H): Primary | ICD-10-CM

## 2022-11-12 ENCOUNTER — HOSPITAL ENCOUNTER (EMERGENCY)
Facility: CLINIC | Age: 57
Discharge: STILL ADMITTED OR ADMINISTRATIVE | End: 2022-11-12

## 2022-11-12 ENCOUNTER — APPOINTMENT (OUTPATIENT)
Dept: GENERAL RADIOLOGY | Facility: CLINIC | Age: 57
DRG: 004 | End: 2022-11-12
Attending: EMERGENCY MEDICINE
Payer: COMMERCIAL

## 2022-11-12 ENCOUNTER — APPOINTMENT (OUTPATIENT)
Dept: CT IMAGING | Facility: CLINIC | Age: 57
DRG: 004 | End: 2022-11-12
Attending: EMERGENCY MEDICINE
Payer: COMMERCIAL

## 2022-11-12 ENCOUNTER — HOSPITAL ENCOUNTER (INPATIENT)
Facility: CLINIC | Age: 57
LOS: 33 days | Discharge: NURSING, REHAB OR LONG-TERM CARE | DRG: 004 | End: 2022-12-15
Attending: EMERGENCY MEDICINE | Admitting: STUDENT IN AN ORGANIZED HEALTH CARE EDUCATION/TRAINING PROGRAM
Payer: COMMERCIAL

## 2022-11-12 ENCOUNTER — APPOINTMENT (OUTPATIENT)
Dept: GENERAL RADIOLOGY | Facility: CLINIC | Age: 57
DRG: 004 | End: 2022-11-12
Attending: STUDENT IN AN ORGANIZED HEALTH CARE EDUCATION/TRAINING PROGRAM
Payer: COMMERCIAL

## 2022-11-12 DIAGNOSIS — N17.9 ACUTE RENAL FAILURE, UNSPECIFIED ACUTE RENAL FAILURE TYPE (H): ICD-10-CM

## 2022-11-12 DIAGNOSIS — I63.10 CEREBROVASCULAR ACCIDENT (CVA) DUE TO EMBOLISM OF PRECEREBRAL ARTERY (H): Primary | ICD-10-CM

## 2022-11-12 DIAGNOSIS — R09.2 RESPIRATORY ARREST (H): ICD-10-CM

## 2022-11-12 DIAGNOSIS — E87.20 LACTIC ACIDOSIS: ICD-10-CM

## 2022-11-12 DIAGNOSIS — G72.81 CRITICAL ILLNESS MYOPATHY: ICD-10-CM

## 2022-11-12 DIAGNOSIS — E87.29 RESPIRATORY ACIDOSIS: ICD-10-CM

## 2022-11-12 LAB
ABO/RH(D): NORMAL
ALBUMIN SERPL-MCNC: 2.3 G/DL (ref 3.4–5)
ALBUMIN SERPL-MCNC: 2.4 G/DL (ref 3.4–5)
ALP SERPL-CCNC: 187 U/L (ref 40–150)
ALP SERPL-CCNC: 223 U/L (ref 40–150)
ALT SERPL W P-5'-P-CCNC: 27 U/L (ref 0–70)
ALT SERPL W P-5'-P-CCNC: 34 U/L (ref 0–70)
ANION GAP SERPL CALCULATED.3IONS-SCNC: 14 MMOL/L (ref 3–14)
ANION GAP SERPL CALCULATED.3IONS-SCNC: 17 MMOL/L (ref 3–14)
ANTIBODY SCREEN: NEGATIVE
APTT PPP: 41 SECONDS (ref 22–38)
APTT PPP: 53 SECONDS (ref 22–38)
AST SERPL W P-5'-P-CCNC: 61 U/L (ref 0–45)
AST SERPL W P-5'-P-CCNC: 86 U/L (ref 0–45)
BASE EXCESS BLDA CALC-SCNC: -10.1 MMOL/L (ref -9–1.8)
BASE EXCESS BLDA CALC-SCNC: -7.5 MMOL/L (ref -9–1.8)
BASE EXCESS BLDA CALC-SCNC: -9.3 MMOL/L (ref -9–1.8)
BASE EXCESS BLDA CALC-SCNC: -9.3 MMOL/L (ref -9–1.8)
BASE EXCESS BLDV CALC-SCNC: -10.1 MMOL/L (ref -7.7–1.9)
BASOPHILS # BLD MANUAL: 0 10E3/UL (ref 0–0.2)
BASOPHILS NFR BLD MANUAL: 0 %
BILIRUB SERPL-MCNC: 1.5 MG/DL (ref 0.2–1.3)
BILIRUB SERPL-MCNC: 1.9 MG/DL (ref 0.2–1.3)
BUN SERPL-MCNC: 81 MG/DL (ref 7–30)
BUN SERPL-MCNC: 82 MG/DL (ref 7–30)
CA-I BLD-MCNC: 4.4 MG/DL (ref 4.4–5.2)
CALCIUM SERPL-MCNC: 8.4 MG/DL (ref 8.5–10.1)
CALCIUM SERPL-MCNC: 9.2 MG/DL (ref 8.5–10.1)
CHLORIDE BLD-SCNC: 100 MMOL/L (ref 94–109)
CHLORIDE BLD-SCNC: 102 MMOL/L (ref 94–109)
CO2 SERPL-SCNC: 21 MMOL/L (ref 20–32)
CO2 SERPL-SCNC: 21 MMOL/L (ref 20–32)
CREAT SERPL-MCNC: 4.68 MG/DL (ref 0.66–1.25)
CREAT SERPL-MCNC: 5.06 MG/DL (ref 0.66–1.25)
EOSINOPHIL # BLD MANUAL: 0 10E3/UL (ref 0–0.7)
EOSINOPHIL NFR BLD MANUAL: 0 %
ERYTHROCYTE [DISTWIDTH] IN BLOOD BY AUTOMATED COUNT: 14.8 % (ref 10–15)
ERYTHROCYTE [DISTWIDTH] IN BLOOD BY AUTOMATED COUNT: 14.8 % (ref 10–15)
FIBRINOGEN PPP-MCNC: 644 MG/DL (ref 170–490)
FLUAV RNA SPEC QL NAA+PROBE: NEGATIVE
FLUBV RNA RESP QL NAA+PROBE: NEGATIVE
GFR SERPL CREATININE-BSD FRML MDRD: 12 ML/MIN/1.73M2
GFR SERPL CREATININE-BSD FRML MDRD: 14 ML/MIN/1.73M2
GLUCOSE BLD-MCNC: 100 MG/DL (ref 70–99)
GLUCOSE BLD-MCNC: 50 MG/DL (ref 70–99)
GLUCOSE BLDC GLUCOMTR-MCNC: 173 MG/DL (ref 70–99)
GLUCOSE BLDC GLUCOMTR-MCNC: 87 MG/DL (ref 70–99)
GLUCOSE BLDC GLUCOMTR-MCNC: 93 MG/DL (ref 70–99)
HCO3 BLD-SCNC: 20 MMOL/L (ref 21–28)
HCO3 BLD-SCNC: 21 MMOL/L (ref 21–28)
HCO3 BLDV-SCNC: 23 MMOL/L (ref 21–28)
HCT VFR BLD AUTO: 35.6 % (ref 40–53)
HCT VFR BLD AUTO: 40 % (ref 40–53)
HGB BLD-MCNC: 11.3 G/DL (ref 13.3–17.7)
HGB BLD-MCNC: 11.6 G/DL (ref 13.3–17.7)
INR PPP: 1.72 (ref 0.85–1.15)
INR PPP: 1.74 (ref 0.85–1.15)
INR PPP: 1.8 (ref 0.85–1.15)
LACTATE SERPL-SCNC: 10.2 MMOL/L (ref 0.7–2)
LACTATE SERPL-SCNC: 5.8 MMOL/L (ref 0.7–2)
LACTATE SERPL-SCNC: 6.9 MMOL/L (ref 0.7–2)
LACTATE SERPL-SCNC: 7 MMOL/L (ref 0.7–2)
LACTATE SERPL-SCNC: 7.9 MMOL/L (ref 0.7–2)
LYMPHOCYTES # BLD MANUAL: 2.1 10E3/UL (ref 0.8–5.3)
LYMPHOCYTES NFR BLD MANUAL: 12 %
MCH RBC QN AUTO: 32.8 PG (ref 26.5–33)
MCH RBC QN AUTO: 33.2 PG (ref 26.5–33)
MCHC RBC AUTO-ENTMCNC: 29 G/DL (ref 31.5–36.5)
MCHC RBC AUTO-ENTMCNC: 31.7 G/DL (ref 31.5–36.5)
MCV RBC AUTO: 105 FL (ref 78–100)
MCV RBC AUTO: 113 FL (ref 78–100)
METAMYELOCYTES # BLD MANUAL: 0.2 10E3/UL
METAMYELOCYTES NFR BLD MANUAL: 1 %
MONOCYTES # BLD MANUAL: 1.9 10E3/UL (ref 0–1.3)
MONOCYTES NFR BLD MANUAL: 11 %
MYELOCYTES # BLD MANUAL: 0.5 10E3/UL
MYELOCYTES NFR BLD MANUAL: 3 %
NEUTROPHILS # BLD MANUAL: 12.6 10E3/UL (ref 1.6–8.3)
NEUTROPHILS NFR BLD MANUAL: 73 %
NRBC # BLD AUTO: 0.9 10E3/UL
NRBC BLD MANUAL-RTO: 5 %
O2/TOTAL GAS SETTING VFR VENT: 100 %
O2/TOTAL GAS SETTING VFR VENT: 80 %
OXYHGB MFR BLD: 87 % (ref 92–100)
OXYHGB MFR BLD: 92 % (ref 92–100)
OXYHGB MFR BLD: 93 % (ref 92–100)
OXYHGB MFR BLD: 96 % (ref 92–100)
PCO2 BLD: 52 MM HG (ref 35–45)
PCO2 BLD: 58 MM HG (ref 35–45)
PCO2 BLD: 62 MM HG (ref 35–45)
PCO2 BLD: 63 MM HG (ref 35–45)
PCO2 BLDV: 102 MM HG (ref 40–50)
PH BLD: 7.11 [PH] (ref 7.35–7.45)
PH BLD: 7.13 [PH] (ref 7.35–7.45)
PH BLD: 7.14 [PH] (ref 7.35–7.45)
PH BLD: 7.21 [PH] (ref 7.35–7.45)
PH BLDV: 6.96 [PH] (ref 7.32–7.43)
PLAT MORPH BLD: ABNORMAL
PLATELET # BLD AUTO: 215 10E3/UL (ref 150–450)
PLATELET # BLD AUTO: 237 10E3/UL (ref 150–450)
PO2 BLD: 107 MM HG (ref 80–105)
PO2 BLD: 69 MM HG (ref 80–105)
PO2 BLD: 72 MM HG (ref 80–105)
PO2 BLD: 83 MM HG (ref 80–105)
PO2 BLDV: 54 MM HG (ref 25–47)
POTASSIUM BLD-SCNC: 3.6 MMOL/L (ref 3.4–5.3)
POTASSIUM BLD-SCNC: 3.7 MMOL/L (ref 3.4–5.3)
PROT SERPL-MCNC: 7 G/DL (ref 6.8–8.8)
PROT SERPL-MCNC: 7.4 G/DL (ref 6.8–8.8)
RBC # BLD AUTO: 3.4 10E6/UL (ref 4.4–5.9)
RBC # BLD AUTO: 3.54 10E6/UL (ref 4.4–5.9)
RBC MORPH BLD: ABNORMAL
RSV RNA SPEC NAA+PROBE: NEGATIVE
SARS-COV-2 RNA RESP QL NAA+PROBE: NEGATIVE
SMUDGE CELLS BLD QL SMEAR: PRESENT
SODIUM SERPL-SCNC: 137 MMOL/L (ref 133–144)
SODIUM SERPL-SCNC: 138 MMOL/L (ref 133–144)
SPECIMEN EXPIRATION DATE: NORMAL
TROPONIN I SERPL HS-MCNC: 10 NG/L
WBC # BLD AUTO: 17.2 10E3/UL (ref 4–11)
WBC # BLD AUTO: 18.7 10E3/UL (ref 4–11)

## 2022-11-12 PROCEDURE — 258N000001 HC RX 258: Performed by: EMERGENCY MEDICINE

## 2022-11-12 PROCEDURE — 99291 CRITICAL CARE FIRST HOUR: CPT | Performed by: STUDENT IN AN ORGANIZED HEALTH CARE EDUCATION/TRAINING PROGRAM

## 2022-11-12 PROCEDURE — 71250 CT THORAX DX C-: CPT

## 2022-11-12 PROCEDURE — 80197 ASSAY OF TACROLIMUS: CPT | Performed by: STUDENT IN AN ORGANIZED HEALTH CARE EDUCATION/TRAINING PROGRAM

## 2022-11-12 PROCEDURE — 85027 COMPLETE CBC AUTOMATED: CPT | Performed by: EMERGENCY MEDICINE

## 2022-11-12 PROCEDURE — 85384 FIBRINOGEN ACTIVITY: CPT | Performed by: STUDENT IN AN ORGANIZED HEALTH CARE EDUCATION/TRAINING PROGRAM

## 2022-11-12 PROCEDURE — 84155 ASSAY OF PROTEIN SERUM: CPT | Performed by: STUDENT IN AN ORGANIZED HEALTH CARE EDUCATION/TRAINING PROGRAM

## 2022-11-12 PROCEDURE — 272N000007 HC KIT ART LINE INSERTION

## 2022-11-12 PROCEDURE — 258N000003 HC RX IP 258 OP 636: Performed by: STUDENT IN AN ORGANIZED HEALTH CARE EDUCATION/TRAINING PROGRAM

## 2022-11-12 PROCEDURE — 250N000011 HC RX IP 250 OP 636: Performed by: STUDENT IN AN ORGANIZED HEALTH CARE EDUCATION/TRAINING PROGRAM

## 2022-11-12 PROCEDURE — 85027 COMPLETE CBC AUTOMATED: CPT | Performed by: STUDENT IN AN ORGANIZED HEALTH CARE EDUCATION/TRAINING PROGRAM

## 2022-11-12 PROCEDURE — 258N000003 HC RX IP 258 OP 636: Performed by: EMERGENCY MEDICINE

## 2022-11-12 PROCEDURE — 84450 TRANSFERASE (AST) (SGOT): CPT | Performed by: STUDENT IN AN ORGANIZED HEALTH CARE EDUCATION/TRAINING PROGRAM

## 2022-11-12 PROCEDURE — 999N000157 HC STATISTIC RCP TIME EA 10 MIN

## 2022-11-12 PROCEDURE — 250N000011 HC RX IP 250 OP 636: Performed by: EMERGENCY MEDICINE

## 2022-11-12 PROCEDURE — 200N000001 HC R&B ICU

## 2022-11-12 PROCEDURE — 85027 COMPLETE CBC AUTOMATED: CPT | Performed by: SURGERY

## 2022-11-12 PROCEDURE — 250N000009 HC RX 250: Performed by: EMERGENCY MEDICINE

## 2022-11-12 PROCEDURE — 94002 VENT MGMT INPAT INIT DAY: CPT

## 2022-11-12 PROCEDURE — 250N000009 HC RX 250: Performed by: STUDENT IN AN ORGANIZED HEALTH CARE EDUCATION/TRAINING PROGRAM

## 2022-11-12 PROCEDURE — 85730 THROMBOPLASTIN TIME PARTIAL: CPT | Performed by: STUDENT IN AN ORGANIZED HEALTH CARE EDUCATION/TRAINING PROGRAM

## 2022-11-12 PROCEDURE — 87149 DNA/RNA DIRECT PROBE: CPT | Performed by: EMERGENCY MEDICINE

## 2022-11-12 PROCEDURE — 80053 COMPREHEN METABOLIC PANEL: CPT | Performed by: EMERGENCY MEDICINE

## 2022-11-12 PROCEDURE — 85730 THROMBOPLASTIN TIME PARTIAL: CPT | Performed by: EMERGENCY MEDICINE

## 2022-11-12 PROCEDURE — C9803 HOPD COVID-19 SPEC COLLECT: HCPCS

## 2022-11-12 PROCEDURE — 250N000011 HC RX IP 250 OP 636

## 2022-11-12 PROCEDURE — 999N000065 XR CHEST PORT 1 VIEW

## 2022-11-12 PROCEDURE — 84145 PROCALCITONIN (PCT): CPT | Performed by: STUDENT IN AN ORGANIZED HEALTH CARE EDUCATION/TRAINING PROGRAM

## 2022-11-12 PROCEDURE — 82805 BLOOD GASES W/O2 SATURATION: CPT | Performed by: EMERGENCY MEDICINE

## 2022-11-12 PROCEDURE — 87637 SARSCOV2&INF A&B&RSV AMP PRB: CPT | Performed by: EMERGENCY MEDICINE

## 2022-11-12 PROCEDURE — 86850 RBC ANTIBODY SCREEN: CPT | Performed by: EMERGENCY MEDICINE

## 2022-11-12 PROCEDURE — 85007 BL SMEAR W/DIFF WBC COUNT: CPT | Performed by: EMERGENCY MEDICINE

## 2022-11-12 PROCEDURE — 96375 TX/PRO/DX INJ NEW DRUG ADDON: CPT | Mod: 59

## 2022-11-12 PROCEDURE — 99285 EMERGENCY DEPT VISIT HI MDM: CPT | Mod: 25

## 2022-11-12 PROCEDURE — 83605 ASSAY OF LACTIC ACID: CPT | Performed by: STUDENT IN AN ORGANIZED HEALTH CARE EDUCATION/TRAINING PROGRAM

## 2022-11-12 PROCEDURE — 82330 ASSAY OF CALCIUM: CPT | Performed by: STUDENT IN AN ORGANIZED HEALTH CARE EDUCATION/TRAINING PROGRAM

## 2022-11-12 PROCEDURE — 36415 COLL VENOUS BLD VENIPUNCTURE: CPT | Performed by: EMERGENCY MEDICINE

## 2022-11-12 PROCEDURE — 3E043XZ INTRODUCTION OF VASOPRESSOR INTO CENTRAL VEIN, PERCUTANEOUS APPROACH: ICD-10-PCS | Performed by: EMERGENCY MEDICINE

## 2022-11-12 PROCEDURE — 82803 BLOOD GASES ANY COMBINATION: CPT | Performed by: EMERGENCY MEDICINE

## 2022-11-12 PROCEDURE — 85610 PROTHROMBIN TIME: CPT | Performed by: EMERGENCY MEDICINE

## 2022-11-12 PROCEDURE — 36620 INSERTION CATHETER ARTERY: CPT

## 2022-11-12 PROCEDURE — 96367 TX/PROPH/DG ADDL SEQ IV INF: CPT | Mod: 59

## 2022-11-12 PROCEDURE — 250N000013 HC RX MED GY IP 250 OP 250 PS 637: Performed by: STUDENT IN AN ORGANIZED HEALTH CARE EDUCATION/TRAINING PROGRAM

## 2022-11-12 PROCEDURE — 87077 CULTURE AEROBIC IDENTIFY: CPT | Performed by: EMERGENCY MEDICINE

## 2022-11-12 PROCEDURE — 5A1955Z RESPIRATORY VENTILATION, GREATER THAN 96 CONSECUTIVE HOURS: ICD-10-PCS | Performed by: EMERGENCY MEDICINE

## 2022-11-12 PROCEDURE — 70450 CT HEAD/BRAIN W/O DYE: CPT

## 2022-11-12 PROCEDURE — 96365 THER/PROPH/DIAG IV INF INIT: CPT | Mod: 59

## 2022-11-12 PROCEDURE — 85610 PROTHROMBIN TIME: CPT | Performed by: STUDENT IN AN ORGANIZED HEALTH CARE EDUCATION/TRAINING PROGRAM

## 2022-11-12 PROCEDURE — 82805 BLOOD GASES W/O2 SATURATION: CPT | Performed by: STUDENT IN AN ORGANIZED HEALTH CARE EDUCATION/TRAINING PROGRAM

## 2022-11-12 PROCEDURE — 71045 X-RAY EXAM CHEST 1 VIEW: CPT

## 2022-11-12 PROCEDURE — 83605 ASSAY OF LACTIC ACID: CPT | Performed by: EMERGENCY MEDICINE

## 2022-11-12 PROCEDURE — 31500 INSERT EMERGENCY AIRWAY: CPT

## 2022-11-12 PROCEDURE — 86901 BLOOD TYPING SEROLOGIC RH(D): CPT | Performed by: EMERGENCY MEDICINE

## 2022-11-12 PROCEDURE — 36556 INSERT NON-TUNNEL CV CATH: CPT

## 2022-11-12 PROCEDURE — 84484 ASSAY OF TROPONIN QUANT: CPT | Performed by: EMERGENCY MEDICINE

## 2022-11-12 RX ORDER — PROPOFOL 10 MG/ML
5-75 INJECTION, EMULSION INTRAVENOUS CONTINUOUS
Status: DISCONTINUED | OUTPATIENT
Start: 2022-11-12 | End: 2022-11-12

## 2022-11-12 RX ORDER — VECURONIUM BROMIDE 1 MG/ML
0.05 INJECTION, POWDER, LYOPHILIZED, FOR SOLUTION INTRAVENOUS EVERY 30 MIN PRN
Status: DISCONTINUED | OUTPATIENT
Start: 2022-11-12 | End: 2022-11-28

## 2022-11-12 RX ORDER — FENTANYL CITRATE 50 UG/ML
INJECTION, SOLUTION INTRAMUSCULAR; INTRAVENOUS
Status: DISCONTINUED
Start: 2022-11-12 | End: 2022-11-12 | Stop reason: WASHOUT

## 2022-11-12 RX ORDER — ONDANSETRON 4 MG/1
4 TABLET, ORALLY DISINTEGRATING ORAL EVERY 6 HOURS PRN
Status: DISCONTINUED | OUTPATIENT
Start: 2022-11-12 | End: 2022-12-15 | Stop reason: HOSPADM

## 2022-11-12 RX ORDER — AMOXICILLIN 250 MG
1 CAPSULE ORAL 2 TIMES DAILY PRN
Status: DISCONTINUED | OUTPATIENT
Start: 2022-11-12 | End: 2022-11-18

## 2022-11-12 RX ORDER — DEXTROSE MONOHYDRATE 25 G/50ML
25-50 INJECTION, SOLUTION INTRAVENOUS
Status: DISCONTINUED | OUTPATIENT
Start: 2022-11-12 | End: 2022-11-16

## 2022-11-12 RX ORDER — FUROSEMIDE 20 MG
20 TABLET ORAL ONCE
Status: ON HOLD | COMMUNITY
End: 2022-12-15

## 2022-11-12 RX ORDER — CALCIUM CARBONATE 500 MG/1
1-2 TABLET, CHEWABLE ORAL 4 TIMES DAILY PRN
Status: ON HOLD | COMMUNITY
End: 2023-04-28

## 2022-11-12 RX ORDER — NALOXONE HYDROCHLORIDE 0.4 MG/ML
0.2 INJECTION, SOLUTION INTRAMUSCULAR; INTRAVENOUS; SUBCUTANEOUS
Status: DISCONTINUED | OUTPATIENT
Start: 2022-11-12 | End: 2022-12-15 | Stop reason: HOSPADM

## 2022-11-12 RX ORDER — ONDANSETRON 2 MG/ML
4 INJECTION INTRAMUSCULAR; INTRAVENOUS EVERY 6 HOURS PRN
Status: DISCONTINUED | OUTPATIENT
Start: 2022-11-12 | End: 2022-12-15 | Stop reason: HOSPADM

## 2022-11-12 RX ORDER — NALOXONE HYDROCHLORIDE 0.4 MG/ML
0.4 INJECTION, SOLUTION INTRAMUSCULAR; INTRAVENOUS; SUBCUTANEOUS
Status: DISCONTINUED | OUTPATIENT
Start: 2022-11-12 | End: 2022-12-15 | Stop reason: HOSPADM

## 2022-11-12 RX ORDER — PIPERACILLIN SODIUM, TAZOBACTAM SODIUM 4; .5 G/20ML; G/20ML
4.5 INJECTION, POWDER, LYOPHILIZED, FOR SOLUTION INTRAVENOUS ONCE
Status: COMPLETED | OUTPATIENT
Start: 2022-11-12 | End: 2022-11-12

## 2022-11-12 RX ORDER — ALBUTEROL SULFATE 90 UG/1
6 AEROSOL, METERED RESPIRATORY (INHALATION) EVERY 4 HOURS
Status: DISCONTINUED | OUTPATIENT
Start: 2022-11-12 | End: 2022-11-15

## 2022-11-12 RX ORDER — METHYLPREDNISOLONE SODIUM SUCCINATE 125 MG/2ML
60 INJECTION, POWDER, LYOPHILIZED, FOR SOLUTION INTRAMUSCULAR; INTRAVENOUS EVERY 6 HOURS
Status: DISCONTINUED | OUTPATIENT
Start: 2022-11-12 | End: 2022-11-17

## 2022-11-12 RX ORDER — FAMOTIDINE 20 MG/1
20 TABLET, FILM COATED ORAL 2 TIMES DAILY PRN
Status: ON HOLD | COMMUNITY
End: 2023-04-28

## 2022-11-12 RX ORDER — DEXTROSE MONOHYDRATE 25 G/50ML
50 INJECTION, SOLUTION INTRAVENOUS ONCE
Status: COMPLETED | OUTPATIENT
Start: 2022-11-12 | End: 2022-11-12

## 2022-11-12 RX ORDER — PIPERACILLIN SODIUM, TAZOBACTAM SODIUM 2; .25 G/10ML; G/10ML
2.25 INJECTION, POWDER, LYOPHILIZED, FOR SOLUTION INTRAVENOUS EVERY 6 HOURS
Status: DISCONTINUED | OUTPATIENT
Start: 2022-11-12 | End: 2022-11-13

## 2022-11-12 RX ORDER — FENTANYL CITRATE 50 UG/ML
25-50 INJECTION, SOLUTION INTRAMUSCULAR; INTRAVENOUS
Status: DISCONTINUED | OUTPATIENT
Start: 2022-11-12 | End: 2022-11-21

## 2022-11-12 RX ORDER — NICOTINE POLACRILEX 4 MG
15-30 LOZENGE BUCCAL
Status: DISCONTINUED | OUTPATIENT
Start: 2022-11-12 | End: 2022-11-16

## 2022-11-12 RX ORDER — METOPROLOL TARTRATE 1 MG/ML
5 INJECTION, SOLUTION INTRAVENOUS EVERY 4 HOURS PRN
Status: DISCONTINUED | OUTPATIENT
Start: 2022-11-12 | End: 2022-12-05

## 2022-11-12 RX ORDER — ATENOLOL 50 MG/1
50 TABLET ORAL DAILY
Status: ON HOLD | COMMUNITY
End: 2022-12-15

## 2022-11-12 RX ORDER — NOREPINEPHRINE BITARTRATE 0.02 MG/ML
.01-.6 INJECTION, SOLUTION INTRAVENOUS CONTINUOUS
Status: DISCONTINUED | OUTPATIENT
Start: 2022-11-12 | End: 2022-11-14

## 2022-11-12 RX ORDER — SODIUM CHLORIDE 9 MG/ML
INJECTION, SOLUTION INTRAVENOUS CONTINUOUS
Status: DISCONTINUED | OUTPATIENT
Start: 2022-11-12 | End: 2022-12-15 | Stop reason: HOSPADM

## 2022-11-12 RX ORDER — MEPERIDINE HYDROCHLORIDE 25 MG/ML
25-50 INJECTION INTRAMUSCULAR; INTRAVENOUS; SUBCUTANEOUS
Status: DISCONTINUED | OUTPATIENT
Start: 2022-11-12 | End: 2022-11-12

## 2022-11-12 RX ORDER — PROCHLORPERAZINE 25 MG
25 SUPPOSITORY, RECTAL RECTAL EVERY 12 HOURS PRN
Status: DISCONTINUED | OUTPATIENT
Start: 2022-11-12 | End: 2022-12-15 | Stop reason: HOSPADM

## 2022-11-12 RX ORDER — PROPOFOL 10 MG/ML
5-75 INJECTION, EMULSION INTRAVENOUS CONTINUOUS
Status: DISCONTINUED | OUTPATIENT
Start: 2022-11-12 | End: 2022-11-21

## 2022-11-12 RX ORDER — AMOXICILLIN 250 MG
2 CAPSULE ORAL 2 TIMES DAILY PRN
Status: DISCONTINUED | OUTPATIENT
Start: 2022-11-12 | End: 2022-11-18

## 2022-11-12 RX ORDER — NICOTINE POLACRILEX 4 MG/1
20 GUM, CHEWING ORAL DAILY
Status: ON HOLD | COMMUNITY
End: 2022-12-15

## 2022-11-12 RX ORDER — FENTANYL CITRATE 50 UG/ML
50 INJECTION, SOLUTION INTRAMUSCULAR; INTRAVENOUS EVERY 30 MIN PRN
Status: DISCONTINUED | OUTPATIENT
Start: 2022-11-12 | End: 2022-11-21

## 2022-11-12 RX ORDER — CHLORHEXIDINE GLUCONATE ORAL RINSE 1.2 MG/ML
15 SOLUTION DENTAL EVERY 12 HOURS
Status: DISCONTINUED | OUTPATIENT
Start: 2022-11-12 | End: 2022-11-21 | Stop reason: CLARIF

## 2022-11-12 RX ORDER — VECURONIUM BROMIDE 1 MG/ML
0.1 INJECTION, POWDER, LYOPHILIZED, FOR SOLUTION INTRAVENOUS
Status: DISCONTINUED | OUTPATIENT
Start: 2022-11-12 | End: 2022-11-28

## 2022-11-12 RX ORDER — PROCHLORPERAZINE MALEATE 10 MG
10 TABLET ORAL EVERY 6 HOURS PRN
Status: DISCONTINUED | OUTPATIENT
Start: 2022-11-12 | End: 2022-12-15 | Stop reason: HOSPADM

## 2022-11-12 RX ADMIN — NOREPINEPHRINE BITARTRATE 4 MG/250 ML (16 MCG/ML) IN 0.9 % NACL IV 0.45 MCG/KG/MIN: at 16:19

## 2022-11-12 RX ADMIN — MINERAL OIL, PETROLATUM: 425; 573 OINTMENT OPHTHALMIC at 16:02

## 2022-11-12 RX ADMIN — PIPERACILLIN AND TAZOBACTAM 4.5 G: 4; .5 INJECTION, POWDER, FOR SOLUTION INTRAVENOUS at 11:51

## 2022-11-12 RX ADMIN — VASOPRESSIN 2.4 UNITS/HR: 20 INJECTION INTRAVENOUS at 16:45

## 2022-11-12 RX ADMIN — SODIUM BICARBONATE 100 MEQ: 84 INJECTION INTRAVENOUS at 11:27

## 2022-11-12 RX ADMIN — PROPOFOL 50 MCG/KG/MIN: 10 INJECTION, EMULSION INTRAVENOUS at 17:26

## 2022-11-12 RX ADMIN — PIPERACILLIN AND TAZOBACTAM 2.25 G: 2; .25 INJECTION, POWDER, FOR SOLUTION INTRAVENOUS at 23:02

## 2022-11-12 RX ADMIN — PROPOFOL 50 MCG/KG/MIN: 10 INJECTION, EMULSION INTRAVENOUS at 23:03

## 2022-11-12 RX ADMIN — DEXTROSE MONOHYDRATE AND SODIUM CHLORIDE: 5; .45 INJECTION, SOLUTION INTRAVENOUS at 11:36

## 2022-11-12 RX ADMIN — PROPOFOL 15 MCG/KG/MIN: 10 INJECTION, EMULSION INTRAVENOUS at 13:04

## 2022-11-12 RX ADMIN — NOREPINEPHRINE BITARTRATE 4 MG/250 ML (16 MCG/ML) IN 0.9 % NACL IV 0.5 MCG/KG/MIN: at 13:51

## 2022-11-12 RX ADMIN — DEXTROSE MONOHYDRATE 50 ML: 25 INJECTION, SOLUTION INTRAVENOUS at 11:26

## 2022-11-12 RX ADMIN — PIPERACILLIN AND TAZOBACTAM 2.25 G: 2; .25 INJECTION, POWDER, FOR SOLUTION INTRAVENOUS at 16:45

## 2022-11-12 RX ADMIN — METHYLPREDNISOLONE SODIUM SUCCINATE 62.5 MG: 125 INJECTION, POWDER, FOR SOLUTION INTRAMUSCULAR; INTRAVENOUS at 21:32

## 2022-11-12 RX ADMIN — NOREPINEPHRINE BITARTRATE 4 MG/250 ML (16 MCG/ML) IN 0.9 % NACL IV 0.35 MCG/KG/MIN: at 18:00

## 2022-11-12 RX ADMIN — SODIUM CHLORIDE: 9 INJECTION, SOLUTION INTRAVENOUS at 16:10

## 2022-11-12 RX ADMIN — FENTANYL CITRATE 50 MCG: 50 INJECTION INTRAMUSCULAR; INTRAVENOUS at 15:26

## 2022-11-12 RX ADMIN — PROPOFOL 50 MCG/KG/MIN: 10 INJECTION, EMULSION INTRAVENOUS at 19:54

## 2022-11-12 RX ADMIN — NOREPINEPHRINE BITARTRATE 4 MG/250 ML (16 MCG/ML) IN 0.9 % NACL IV 0.05 MCG/KG/MIN: at 11:49

## 2022-11-12 RX ADMIN — MIDAZOLAM HYDROCHLORIDE 2 MG: 1 INJECTION, SOLUTION INTRAMUSCULAR; INTRAVENOUS at 11:44

## 2022-11-12 RX ADMIN — NOREPINEPHRINE BITARTRATE 4 MG/250 ML (16 MCG/ML) IN 0.9 % NACL IV 0.4 MCG/KG/MIN: at 14:54

## 2022-11-12 RX ADMIN — VANCOMYCIN HYDROCHLORIDE 2500 MG: 10 INJECTION, POWDER, LYOPHILIZED, FOR SOLUTION INTRAVENOUS at 12:52

## 2022-11-12 RX ADMIN — CHLORHEXIDINE GLUCONATE 0.12% ORAL RINSE 15 ML: 1.2 LIQUID ORAL at 21:32

## 2022-11-12 RX ADMIN — SODIUM CHLORIDE 1000 ML: 9 INJECTION, SOLUTION INTRAVENOUS at 11:30

## 2022-11-12 RX ADMIN — METHYLPREDNISOLONE SODIUM SUCCINATE 62.5 MG: 125 INJECTION, POWDER, FOR SOLUTION INTRAMUSCULAR; INTRAVENOUS at 16:20

## 2022-11-12 RX ADMIN — NOREPINEPHRINE BITARTRATE 4 MG/250 ML (16 MCG/ML) IN 0.9 % NACL IV 0.3 MCG/KG/MIN: at 20:15

## 2022-11-12 ASSESSMENT — ACTIVITIES OF DAILY LIVING (ADL)
ADLS_ACUITY_SCORE: 47
ADLS_ACUITY_SCORE: 43
ADLS_ACUITY_SCORE: 35
ADLS_ACUITY_SCORE: 35

## 2022-11-12 ASSESSMENT — ENCOUNTER SYMPTOMS
FATIGUE: 1
SPEECH DIFFICULTY: 1
SHORTNESS OF BREATH: 1
WEAKNESS: 1
CONFUSION: 1
COUGH: 1
FEVER: 1

## 2022-11-12 NOTE — PLAN OF CARE
Care provided from [phone]    Neuro: Patient sedated on propofol. RASS goal -5; patient not withdrawing from pain. PERRL.   CV: SR with PVC's. Blood pressures supported with levo and vaso.   Pulm: mechanical ventilator 100% and PEEP 14. Coarse lung sounds with thick pink secretions  GI/: Nixon with no urine output. MD aware. Yoli jose.   Additional: family updated on plan of care. Cooling goal 33 degrees. Reached at 1600

## 2022-11-12 NOTE — PROGRESS NOTES
[facility] ICU RESPIRATORY NOTE        Date of Admission: 11/12/2022    Date of Intubation (most recent): 11/12/2022    Reason for Mechanical Ventilation: Cardiac arrest and Respiratory Failure    Number of Days on Mechanical Ventilation: Initial Day.    Met Criteria for Spontaneous Breathing Trial: No.    Reason for No Spontaneous Breathing Trial: PEEP +14 cm H2O, 100% FiO2.       ABG Results:   Recent Labs   Lab 11/12/22  1656 11/12/22  1502 11/12/22  1233 11/12/22  1141   PH 7.13* 7.14* 7.11*  --    PCO2 62* 58* 63*  --    PO2 107* 83 69*  --    HCO3 20* 20* 20*  --    O2PER 100 100 100 100       Current Vent Settings: Vent Mode: CMV/AC  (Continuous Mandatory Ventilation/ Assist Control)  FiO2 (%): 100 %  Resp Rate (Set): 16 breaths/min  Tidal Volume (Set, mL): 400 mL  PEEP (cm H2O): 14 cmH2O  Resp: 16      Skin Assessment: Intact.    Plan: Continue to monitor patient on full ventilatory support, assess for weaning daily.     Moises Martinez, RRT on 11/12/2022 at 5:09 PM

## 2022-11-12 NOTE — PHARMACY-VANCOMYCIN DOSING SERVICE
"Pharmacy Vancomycin Initial Note  Date of Service 2022  Patient's  1965  57 year old, male    Indication: Sepsis    Current estimated CrCl = Estimated Creatinine Clearance: 19.6 mL/min (A) (based on SCr of 5.06 mg/dL (H)).    Creatinine for last 3 days  2022: 11:47 AM Creatinine 5.06 mg/dL    Recent Vancomycin Level(s) for last 3 days  No results found for requested labs within last 72 hours.      Vancomycin IV Administrations (past 72 hours)                   vancomycin (VANCOCIN) 2,500 mg in 0.9% NaCl 500 mL intermittent infusion (mg) 2,500 mg New Bag 22 1252                Nephrotoxins and other renal medications (From now, onward)    Start     Dose/Rate Route Frequency Ordered Stop    22 1730  piperacillin-tazobactam (ZOSYN) 2.25 g vial to attach to  ml bag        Note to Pharmacy: For [facility], [facility] and [facility]: For Zosyn-naive patients, use the \"Zosyn initial dose + extended infusion\" order panel.    2.25 g  over 30 Minutes Intravenous EVERY 6 HOURS 22 1444      22 1155  norepinephrine (LEVOPHED) 4 mg in  mL infusion PREMIX         0.01-0.6 mcg/kg/min × 101 kg  3.8-227.3 mL/hr  Intravenous CONTINUOUS 22 1153            Contrast Orders - past 72 hours (72h ago, onward)    None          Insight[employer] Prediction of Planned Initial Vancomycin Regimen  Predicted AUC ~ 24h post loading dose = 445.        Plan:  1. Received Vancomycin 2500 mg (~25 mg/kg) IV x 1 dose in ED.  Continued dosing based on intermittent levels.    2. Vancomycin monitoring method: AUC  3. Vancomycin therapeutic monitoring goal: 400-600 mg*h/L  4. Pharmacy will check vancomycin levels as appropriate with AM labs..    5. Serum creatinine levels will be ordered daily for the first week of therapy and at least twice weekly for subsequent weeks.      Shayla Cam, PharmD, BCPS  "

## 2022-11-12 NOTE — ED NOTES
Patient becomes hypoxic in the low 80s and high 70s.  This seems to be positional.  He does better when he is sat up and sitting towards the right side.  He is 90% on 100% Fio2.

## 2022-11-12 NOTE — ED NOTES
Patient transported to CT with 2 RNS, RT, ERT and all monitors.  Patient tolerated the scans well, his oxygen was 90%.  All other vitals were stable as well.  Patient developed some bleeding from his mouth after CT scan, patient was lightly suctioned after arriving to ICU. Patient transferred from ED cart to ICU.

## 2022-11-12 NOTE — ED PROVIDER NOTES
"  History   Chief Complaint:  Cardiac Arrest       HPI   Blanco Osborne is a 57 year old male with history of liver transplant and pleura effusion who presents from an apartment following cardiac arrest. Patient was short of breath and weak prompting a 911 call. Firefighters arrived around 1011 and were escorting him when he collapsed and had cardiac arrest. CPR was immediately started and EMS arrived 20 minutes later. He was found to be in PEA - they gave 2 amps of epinephrine and 1 amp of bicarb after which pulse returned. He was not shocked.  His oxygen levels were in the 50s while in transit and his CO levels were \"high.\"     Patient's wife and brother gave supplemental history. They report that his has had oxygen levels in the 80s on home pulse oximiter, with fatigue, fever, urinary urgency, congestion, dry cough progressing to wet cough for the past week. Today he had difficulty using a remote, difficulty using his phone, was confused, and had slurred speech. Wife called brother and they decide to call 911. Patient choose to walk down a hallway and was taken down an elevator. He had his cardiac arrest just as he was getting out of the elevator.     History is severely limited due to patient's intubation and medical emergency      Review of Systems   Constitutional: Positive for fatigue and fever.   HENT: Positive for congestion.    Respiratory: Positive for cough and shortness of breath.    Genitourinary: Positive for urgency.   Neurological: Positive for speech difficulty and weakness.   Psychiatric/Behavioral: Positive for confusion.   All other systems reviewed and are negative.  ROS limited secondary to intubation and medical emergency.      Allergies:  No known drug allergies     Medications:  Norvasc   Atenolol    Past Medical History:     Cirrhosis of liver with ascites (H)  NAFLD (nonalcoholic fatty liver disease)  Liver transplant candidate  Liver transplanted (H)  Trauma  Immunosuppression " (H)  Ascites of liver  Postoperative ileus (H)  Acute kidney injury (H)  Decreased flow of hepatic artery during surgery  Coagulopathy (H)  Hyperlipidemia  Hypertension  Leukocytosis  Pleural effusion  SBP (spontaneous bacterial peritonitis) (H)    Past Surgical History:    Appendectomy   Hernia repair     Family History:    Brother: diabetes     Social History:  The patient presents to the ED alone  PCP: Ki Powers A     Physical Exam     Patient Vitals for the past 24 hrs:   BP Temp Temp src Pulse Resp SpO2 Height Weight   11/12/22 1329 -- -- -- -- 26 (!) 88 % -- --   11/12/22 1328 -- -- -- -- (!) 33 (!) 88 % -- --   11/12/22 1327 -- -- -- -- 16 90 % -- --   11/12/22 1326 -- -- -- -- 15 (!) 89 % -- --   11/12/22 1325 -- -- -- -- 19 (!) 87 % -- --   11/12/22 1323 -- -- -- -- 21 (!) 88 % -- --   11/12/22 1322 -- -- -- -- 10 90 % -- --   11/12/22 1321 -- -- -- -- 11 (!) 88 % -- --   11/12/22 1317 -- (!) 93.6  F (34.2  C) Bladder -- -- -- -- --   11/12/22 1315 102/61 -- -- 72 27 (!) 89 % -- --   11/12/22 1306 -- -- -- 78 24 91 % -- --   11/12/22 1305 -- -- -- 75 26 90 % -- --   11/12/22 1304 -- -- -- 75 16 92 % -- --   11/12/22 1303 99/77 -- -- 75 16 (!) 80 % -- --   11/12/22 1302 -- -- -- 74 23 (!) 83 % -- --   11/12/22 1300 97/64 -- -- 76 23 (!) 84 % -- --   11/12/22 1259 -- -- -- 75 24 (!) 81 % -- --   11/12/22 1258 -- -- -- 75 18 (!) 82 % -- --   11/12/22 1257 100/68 -- -- 78 13 (!) 82 % -- --   11/12/22 1255 -- -- -- 76 9 (!) 85 % -- --   11/12/22 1254 (!) 82/62 -- -- 79 (!) 33 (!) 84 % -- --   11/12/22 1253 -- -- -- 78 15 (!) 84 % -- --   11/12/22 1252 -- -- -- 77 19 (!) 84 % -- --   11/12/22 1251 97/62 -- -- 78 16 (!) 86 % -- --   11/12/22 1249 -- -- -- 80 (!) 6 (!) 84 % -- --   11/12/22 1248 (!) 96/33 -- -- 78 (!) 6 (!) 85 % -- --   11/12/22 1247 -- -- -- 76 11 (!) 85 % -- --   11/12/22 1202 -- -- -- 83 (!) 0 (!) 87 % -- --   11/12/22 1201 -- (!) 93.9  F (34.4  C) -- 84 21 (!) 87 % -- --   11/12/22 1200  (!) 73/46 (!) 93.7  F (34.3  C) -- 84 -- -- -- --   11/12/22 1159 -- (!) 93.9  F (34.4  C) -- 86 19 (!) 87 % -- --   11/12/22 1158 (!) 73/44 (!) 93.9  F (34.4  C) -- 85 14 (!) 87 % -- --   11/12/22 1157 (!) 73/42 (!) 93.9  F (34.4  C) -- 86 11 (!) 87 % -- --   11/12/22 1156 -- (!) 93.9  F (34.4  C) -- 87 (!) 6 (!) 86 % -- --   11/12/22 1155 (!) 73/60 (!) 93.9  F (34.4  C) -- 87 12 (!) 86 % -- --   11/12/22 1152 -- (!) 93.9  F (34.4  C) -- 87 11 (!) 89 % -- --   11/12/22 1151 -- (!) 93.9  F (34.4  C) -- 88 8 (!) 84 % -- --   11/12/22 1150 -- (!) 93.9  F (34.4  C) -- 87 10 (!) 88 % -- --   11/12/22 1149 -- (!) 93.9  F (34.4  C) -- 88 (!) 0 90 % -- --   11/12/22 1137 -- -- -- 86 (!) 0 95 % 1.829 m (6') 101 kg (222 lb 10.6 oz)   11/12/22 1136 -- (!) 92.8  F (33.8  C) -- -- -- -- -- --   11/12/22 1135 98/58 (!) 92.3  F (33.5  C) -- 87 (!) 0 96 % -- --   11/12/22 1134 -- (!) 92.1  F (33.4  C) -- 87 9 96 % -- --   11/12/22 1133 -- (!) 91.8  F (33.2  C) -- 89 13 -- -- --   11/12/22 1132 -- (!) 91.4  F (33  C) -- 89 13 94 % -- --   11/12/22 1131 -- (!) 91  F (32.8  C) -- -- -- -- -- --   11/12/22 1130 -- (!) 90.9  F (32.7  C) -- 89 9 94 % -- --   11/12/22 1129 100/64 (!) 90.9  F (32.7  C) -- 89 9 93 % -- --   11/12/22 1128 -- (!) 91  F (32.8  C) -- 85 8 93 % -- --   11/12/22 1127 -- -- -- -- 15 94 % -- --   11/12/22 1126 -- -- -- 85 (!) 7 -- -- --   11/12/22 1124 110/70 -- -- 85 17 91 % -- --       Physical Exam   General: Intubated patient.  He does not respond to pain.  He has agonal respirations.    Eye:  Pupils are equal at approximately 3 mm and minimally responsive.    ENT:  No rhinorrhea.  Moist mucus membranes.    Cardiac:  Regular rate and rhythm.  No murmurs, gallops, or rubs.    Pulmonary: Diminished breath sounds throughout.  Agonal respirations, aided by bagging by respiratory therapy    Abdomen: The abdomen is markedly distended with probable fluid wave.  Scars in place secondary to prior liver  transplant.    Musculoskeletal: No evidence of trauma to the extremities or skull    Skin: The patient is cold and mottled    Neurologic:  Non-focal exam without asymmetric weakness or numbness.     Psychiatric:  Normal affect with appropriate interaction with examiner.     Emergency Department Course   Imaging:  XR Chest Port 1 View   Preliminary Result   IMPRESSION:    1.  Endotracheal tube tip appears to be approximately 5.1 cm above the og. It is somewhat difficult to evaluate due to the overlying cardiac leads, enteric sump, and enteric thermistor probe.   2.  Bibasilar opacities likely represent atelectasis, infiltrates and/or effusions.   3.  Cardiomegaly is suggested.   4.  Interval removal of external pacer patch projected over the left lower chest.   5.  No other changes.         CT Chest Abdomen Pelvis w/o Contrast   Pending   Head CT w/o contrast   Pending        XR Chest Port 1 View    (Results Pending)   Echocardiogram Limited    (Results Pending)     Report per radiology    Laboratory:  Labs Ordered and Resulted from Time of ED Arrival to Time of ED Departure   COMPREHENSIVE METABOLIC PANEL - Abnormal       Result Value    Sodium 138      Potassium 3.6      Chloride 100      Carbon Dioxide (CO2) 21      Anion Gap 17 (*)     Urea Nitrogen 81 (*)     Creatinine 5.06 (*)     Calcium 9.2      Glucose 50 (*)     Alkaline Phosphatase 223 (*)     AST 61 (*)     ALT 27      Protein Total 7.4      Albumin 2.4 (*)     Bilirubin Total 1.5 (*)     GFR Estimate 12 (*)    LACTIC ACID WHOLE BLOOD - Abnormal    Lactic Acid 10.2 (*)    PARTIAL THROMBOPLASTIN TIME - Abnormal    aPTT 53 (*)    INR - Abnormal    INR 1.72 (*)    BLOOD GAS VENOUS - Abnormal    pH Venous 6.96 (*)     pCO2 Venous 102 (*)     pO2 Venous 54 (*)     Bicarbonate Venous 23      Base Excess/Deficit (+/-) -10.1 (*)     FIO2 100     CBC WITH PLATELETS AND DIFFERENTIAL - Abnormal    WBC Count 17.2 (*)     RBC Count 3.54 (*)     Hemoglobin 11.6  (*)     Hematocrit 40.0       (*)     MCH 32.8      MCHC 29.0 (*)     RDW 14.8      Platelet Count 237     DIFFERENTIAL - Abnormal    % Neutrophils 73      % Lymphocytes 12      % Monocytes 11      % Eosinophils 0      % Basophils 0      % Metamyelocytes 1      % Myelocytes 3      NRBCs per 100 WBC 5 (*)     Absolute Neutrophils 12.6 (*)     Absolute Lymphocytes 2.1      Absolute Monocytes 1.9 (*)     Absolute Eosinophils 0.0      Absolute Basophils 0.0      Absolute Metamyelocytes 0.2 (*)     Absolute Myelocytes 0.5 (*)     Absolute NRBCs 0.9 (*)     RBC Morphology Confirmed RBC Indices      Platelet Assessment        Value: Automated Count Confirmed. Platelet morphology is normal.    Smudge Cells Present (*)    GLUCOSE BY METER - Abnormal    GLUCOSE BY METER POCT 173 (*)    LACTIC ACID WHOLE BLOOD - Abnormal    Lactic Acid 7.9 (*)    BLOOD GAS ARTERIAL WITH OXYHEMOGLOBIN - Abnormal    pH Arterial 7.11 (*)     pCO2 Arterial 63 (*)     pO2 Arterial 69 (*)     Bicarbonate Arterial 20 (*)     Oxyhemoglobin Arterial 87 (*)     Base Excess/Deficit (+/-) -10.1 (*)     FIO2 100     TROPONIN I - Normal    Troponin I High Sensitivity 10     INFLUENZA A/B & SARS-COV2 PCR MULTIPLEX - Normal    Influenza A PCR Negative      Influenza B PCR Negative      RSV PCR Negative      SARS CoV2 PCR Negative     BLOOD GAS ARTERIAL   LACTIC ACID WHOLE BLOOD   TYPE AND SCREEN, ADULT    ABO/RH(D) O POS      Antibody Screen Negative      SPECIMEN EXPIRATION DATE [id number]     BLOOD CULTURE   BLOOD CULTURE   ABO/RH TYPE AND SCREEN        [facility]    -Intubation    Date/Time: 11/12/2022 12:32 PM  Performed by: Trierweiler, Chad A, MD  Authorized by: Trierweiler, Chad A, MD     Emergent condition/consent implied      UNIVERSAL PROTOCOL   Site Marked: NA  Prior Images Obtained and Reviewed:  NA  Required items: Required blood products, implants, devices and special equipment available    Patient identity  confirmed:  Anonymous protocol, patient vented/unresponsive  NA - No sedation, light sedation, or local anesthesia  Confirmation Checklist:  Correct equipment/implants were available  Time out: Immediately prior to the procedure a time out was called    Universal Protocol: the Joint Commission Universal Protocol was followed    Preparation: Patient was prepped and draped in usual sterile fashion      PRE-PROCEDURE DETAILS     Patient status:  Unresponsive    Mallampati score:  II    Pretreatment medications:  None    Paralytics:  None      PROCEDURE DETAILS     Preoxygenation:  None    CPR in progress: no      Intubation method:  Oral    Oral intubation technique:  Fiber optic    Laryngoscope blade:  Mac 4    Tube size (mm):  8.0    Tube type:  Cuffed    Number of attempts:  1    Ventilation between attempts: no      Cricoid pressure: no      Tube visualized through cords: yes      PLACEMENT ASSESSMENT     ETT to lip:  25    ETT to teeth:  24    Tube secured with:  ETT richards    Breath sounds:  Equal    Placement verification: chest rise, condensation, CXR verification, ETCO2 detector and tube exhalation      CXR findings:  ETT in proper place    PROCEDURE    Patient Tolerance:  Patient tolerated the procedure well with [facility]    -Arterial Line    Date/Time: 11/12/2022 4:48 PM  Performed by: Trierweiler, Chad A, MD  Authorized by: Trierweiler, Chad A, MD     Emergent condition/consent implied      UNIVERSAL PROTOCOL   Site Marked: Yes  Prior Images Obtained and Reviewed:  NA  Required items: Required blood products, implants, devices and special equipment available    Patient identity confirmed:  Anonymous protocol, patient vented/unresponsive  NA - No sedation, light sedation, or local anesthesia  Confirmation Checklist:  Patient's identity using two indicators, relevant allergies, procedure was appropriate and matched the consent or emergent situation and  correct equipment/implants were available  Time out: Immediately prior to the procedure a time out was called    Universal Protocol: the Joint [facility] Universal Protocol was followed    Preparation: Patient was prepped and draped in usual sterile fashion      INDICATIONS:   Indications: hemodynamic monitoring and multiple ABGs      PRE-PROCEDURE DETAILS:   Skin preparation:  2% Chlorhexidine  Preparation: Patient was prepped and draped in sterile fashion    PROCEDURE DETAILS:    Location:  R femoral  Monty's test performed: no    Needle gauge:  20 G  Placement technique:  Seldinger and ultrasound guided  Number of attempts:  1  Transducer: waveform confirmed      POST PROCEDURE DETAILS:    Post-procedure:  Sutured  CMS:  Normal      PROCEDURE    Patient Tolerance:  Patient tolerated the procedure well with no immediate complicationsEss[facility]    -Central Line    Date/Time: 11/12/2022 4:48 PM  Performed by: Trierweiler, Chad A, MD  Authorized by: Trierweiler, Chad A, MD     Emergent condition/consent implied      UNIVERSAL PROTOCOL   Site Marked: NA  Prior Images Obtained and Reviewed:  NA  Required items: Required blood products, implants, devices and special equipment available    Patient identity confirmed:  Anonymous protocol, patient vented/unresponsive  NA - No sedation, light sedation, or local anesthesia  Confirmation Checklist:  Patient's identity using two indicators, relevant allergies, procedure was appropriate and matched the consent or emergent situation and correct equipment/implants were available  Time out: Immediately prior to the procedure a time out was called    Universal Protocol: the Joint Commission Universal Protocol was followed    Preparation: Patient was prepped and draped in usual sterile fashion    ESBL (mL):  60    PRE-PROCEDURE DETAILS:     Hand hygiene: Hand hygiene performed prior to insertion      Sterile barrier technique: All elements of maximal  sterile technique followed      Skin preparation:  ChloraPrep    Skin preparation agent: Skin preparation agent completely dried prior to procedure      PROCEDURE DETAILS:     Location:  R femoral    Patient position:  Trendelenburg    Procedural supplies:  Cordis and triple lumen    Catheter size:  9 Fr    Landmarks identified: yes      Ultrasound guidance: yes      Sterile ultrasound techniques: Sterile gel and sterile probe covers were used      Number of attempts:  2    Successful placement: yes      POST PROCEDURE DETAILS:      Post-procedure:  Dressing applied and line sutured    Assessment:  Blood return through all ports, free fluid flow and placement verified by x-ray    PROCEDURE    Patient Tolerance:  Patient tolerated the procedure well with no immediate complications          Emergency Department Course:  1112 Patient arrives placed on monitor  1116 Blood drawn, intubated   1119 NG tube placed  1125 9.8 lactic acid    Consults:  1137 I spoke with the patient's wife and brother  1300 I again spoke with patient's wife and brother  1315 I spoke with Dr. Hassan of ICU     Interventions:  Medications   vancomycin (VANCOCIN) 2,500 mg in 0.9% NaCl 500 mL intermittent infusion (2,500 mg Intravenous New Bag 11/12/22 1252)   norepinephrine (LEVOPHED) 4 mg in  mL infusion PREMIX (0.5 mcg/kg/min × 101 kg Intravenous New Bag 11/12/22 1351)   propofol (DIPRIVAN) infusion (20 mcg/kg/min × 101 kg Intravenous Rate/Dose Change 11/12/22 1352)   piperacillin-tazobactam (ZOSYN) 4.5 g vial to attach to  mL bag (0 g Intravenous Stopped 11/12/22 1219)   midazolam (VERSED) injection 2 mg (2 mg Intravenous Given 11/12/22 1144)   dextrose 50 % injection 50 mL (50 mLs Intravenous Given 11/12/22 1126)   sodium bicarbonate 8.4 % injection 100 mEq (100 mEq Intravenous Given 11/12/22 1127)   0.9% sodium chloride BOLUS (0 mLs Intravenous Stopped 11/12/22 1230)   dextrose 5% and 0.45% NaCl infusion (0 mLs Intravenous  "Stopped 11/12/22 1315)        Disposition:  The patient was admitted to the hospital under the care of Dr. Hassan.     Impression & Plan   CMS Diagnoses:   The patient has signs of Septic Shock  The patient has signs of septic shock as evidenced by:  1. Presence of Sepsis, AND  2. Lactic Acidosis with value greater than or equal to 4 and Persistent hypotension defined by the last 2 BP readings within the ONE HOUR following completion of the 30mL/kg bolus being low (SBP <90 or MAP <65)    Time septic shock diagnosis confirmed = 1145 AM  11/12/22   as this was the time when Lactate was resulted and the level was greater than or equal to 4    3 Hour Septic Shock Bundle Completion:  1. Initial Lactic Acid Result:   Recent Labs   Lab Test 11/12/22  1502 11/12/22  1438 11/12/22  1232   LACT 7.0* 6.9* 7.9*     2. Blood Cultures before Antibiotics: Yes  3. Broad Spectrum Antibiotics Administered:  yes       Anti-infectives (From admission through now)    Start     Dose/Rate Route Frequency Ordered Stop    11/12/22 1155  vancomycin (VANCOCIN) 2,500 mg in 0.9% NaCl 500 mL intermittent infusion         2,500 mg  over 120 Minutes Intravenous ONCE 11/12/22 1153 11/12/22 1452    11/12/22 1155  piperacillin-tazobactam (ZOSYN) 4.5 g vial to attach to  mL bag        Note to Pharmacy: For [facility], [facility] and [facility]: For Zosyn-naive patients, use the \"Zosyn initial dose + extended infusion\" order panel.    4.5 g  over 30 Minutes Intravenous ONCE 11/12/22 1153 11/12/22 1219          4. IF 30 mL/kg bolus criteria met based on:  -Lactate > 4  OR  -Initial Hypotension:  Definition:  2 low BP readings (SBP <90, MAP <65, or decrease > 40 from baseline due to infection) within 3 hrs of each other during the time period of 6 hrs before and 3 hrs  after time zero  THEN: Fluid volume administered in ED:  Obese patient (BMI >30); 30 mL/kg bolus based on IBW given (see amount below).    BMI Readings from Last 1 Encounters:   11/12/22 30.20 kg/m  "     30 mL/kg fluids based on weight: 3,030 mL  30 mL/kg fluids based on IBW (must be >= 60 inches tall): 2,330 mL    Septic Shock reassessment:  1. Repeat Lactic Acid Level: 7.9  2. Vasopressors started for Persistent Hypotension defined by the last 2 BP readings within the ONE HOUR following completion of the 30mL/kg bolus being low (SBP <90 or MAP <65).    I attest to having performed a repeat sepsis exam and assessment of perfusion at 1200 and the results demonstrate improved perfusion.      Medical Decision Making:  This critically ill 57-year-old man presents to us with a PEA arrest, likely secondary to a respiratory arrest.  The patient is 4 years status post liver transplant.  The wife stated that he developed what she thought was an upper respiratory infection approximately 1 week ago with fever, congestion, and a dry cough.  Approximately 3 days ago this cough became much more wet and the patient appeared more labored in his breathing.  She started to notice that he was sitting in a tripod position at times while sleeping prone because it was the only way that he can get enough air.  Yesterday, she noted that he seemed to be more confused than typical, having a hard time utilizing the remote control or texting on his phone.  She has been urging him to be seen by his doctor and then to come to the emergency department, but he has adamantly refused.  This morning, he was even more confused, staggering and clearly dyspneic.  She called his brother to come to their residence to see if he could convince the patient to come to the hospital.  With the patient being clearly altered and dyspneic, the decision was made to call EMS.  The patient was in agreement to come to the hospital.  However, he thought it was reasonable to walk on the [place] of their apartment to the elevator to meet the fire crew there.  When he got into the elevator, he was ashen and gray, markedly diaphoretic and tachypneic and collapsed with  "the fire crew in the elevator.    From what I can gather, the fire crew immediately initiated CPR.  He placed an Igel airway device and started CPR with the Titus device.  Shock was not advised.  Paramedics were called and arrived on scene approximately 10 minutes later.  Per their report, they found the patient to be in a PEA arrest.  An IO was established.  The patient was given an amp of bicarbonate and 2 A of epinephrine.  With this, he did have return of spontaneous circulation.  The paramedic crew elected to intubate the patient, stating that they use a fiberoptic scope and a confirmed that the tube passed through the cords.  However, per the report, as the patient was moved from the ground onto the [facility], there was some challenges getting him up due to his size with a Titus device becoming dislodged.  They did not comment as to where the airway was rechecked at that time.  Nonetheless, they proceeded with lights and sirens to our hospital.  They noted that his oxygenation was rather poor with a difficult waveform and \"very high CO2 levels.\"  When they arrived to the emergency department, the patient had a pulse, but had agonal respirations and appeared dusky and pale, cool to the touch, with poor perfusion.    I immediately assessed the patient's airway and respiratory therapy was also concerned that bagging did not seem appropriate.  I checked the endotracheal tube and found it to be in the esophagus.  This was immediately removed and the patient was quickly intubated as noted above with an 8 oh ET tube with good chest rise, appropriate CO2 detection, and confirmation made with chest x-ray.  The ET tube was a little shallow and therefore it was advanced 2 cm.  EKG was quickly obtained which showed a normal sinus rhythm without sign of ST or T wave changes.  Blood pressure was appropriate.  2 large-bore IVs were obtained and fluid resuscitation was started.  At this point, I received the results of his i-STAT " "testing, showing a pH of \"less than 7\" which was later found to be at 6.8.  The PCO2 level was also unreadable at \"greater than 150.\"  With this, the patient was given 2 A of bicarbonate.  I reviewed the patient's chest x-ray which showed significant amounts of fluid versus pleural effusions bilaterally.  There is also a protuberant abdomen on exam which leads me to believe that he likely has ascites as well.  Some of this may be from air insufflation from his esophageal intubation.  I directed pharmacy to immediately get a dose of Zosyn and vancomycin to cover for sepsis.  We continued with resuscitation and labs returned, showing normal electrolytes, but evidence of renal failure.  A catheter was placed with patient having no urine output.  Core temperature was checked and was found to be low and I directed my technician to find a cool guard catheter for central line placement along with arterial line placement.    At this point, I sat down with the patient's wife and brother and obtained the above history of the events leading up to this spell.  All of this points to a respiratory arrest, likely from a pulmonary source.  I went back to assess the patient.  We were having a difficult time providing adequate ventilation for the patient.  Despite being at 100% FiO2, a tidal volume level of 500, and PEEP of 10, oxygenation was in the high 80s to low 90s.  This worsened whenever the patient was laid flat, likely due to the fluid in the thoracic cavity along with his protuberant abdomen.  We found he oxygenated best when sitting upright.  We did have to lay him back to place the central line cool guard catheter and arterial line in the right groin as noted above.  However, once these were placed and the patient was sat up, his oxygenation improved.  He required 1 dose of Versed early in his course for sedation, but otherwise he did not show appropriate cognition to the discomfort from intubation or our " procedures.    The patient was now becoming more hypotensive.  I initiated pressor support with Levophed and this was titrated up consistently to a level of 0.5 to provide a mean arterial pressure greater than 60.  Recheck of his ABG and lactate show signs of improvement, though he continues to be critically ill.    Once cooling was initiated, I did speak with the research team that does not feel patient qualifies for our research study due to the fact that sepsis is a likely cause for his respiratory arrest.  I again spoke with family and notified them of the situation.  I expressed that my greatest concern is that he likely suffered some degree of anoxic injury due to his cardiac arrest.  However, he will be cooled and sedated and we will see how his mental status improves.  On my repeat neuro exams, he is showing signs of decorticate posturing.  His pupils are not fixed or dilated, and he does have a gag reflex.  He is breathing spontaneously with assistance from the ventilator.  Outside of this, he does not show other purposeful movements at this time.  We elected to give some propofol for comfort will be continued to cool him.  He did not require paralysis.    At this point, I spoke with the intensivist who agrees to accept care of the patient.  He requested a CT of the head/chest/abdomen/pelvis to better determine if there are other issues at play.  Otherwise, the patient has now been given fluids, antibiotics, pressors, and is appropriately aligned and labs.  I feel critical actions have been met in the emergency department and he is stable for transfer to the intensive care unit.    Critical Care Time: was 120 minutes for this patient excluding procedures    Diagnosis:    ICD-10-CM    1. Respiratory arrest (H)  R09.2       2. Acute renal failure, unspecified acute renal failure type (H)  N17.9       3. Lactic acidosis  E87.20       4. Respiratory acidosis  E87.29           Scribe Disclosure:  Spencer LANDON  Nehemiah, am serving as a scribe at 11:21 AM on 11/12/2022 to document services personally performed by Trierweiler, Chad A, MD based on my observations and the provider's statements to me.            Trierweiler, Chad A, MD  11/12/22 [id number]

## 2022-11-12 NOTE — ED NOTES
[facility] ICU RESPIRATORY NOTE        Date of Admission: 11/12/2022    Date of Intubation (most recent): 11/12/22    Reason for Mechanical Ventilation: Arrest    Number of Days on Mechanical Ventilation: 1    Met Criteria for Spontaneous Breathing Trial: No    Reason for No Spontaneous Breathing Trial: NA    Significant Events Today: pt collapsed in the elevator and didn't have a pulse.  CPR was started and done for about 20 minutes by EMS/FD. Pt arrived at [facility] with ETT in the stomach. Pt re intubated with 8.0 secured 25@teeth.    ABG Results: No lab results found in last 7 days.    Current Vent Settings: Vent Mode: CMV/AC  (Continuous Mandatory Ventilation/ Assist Control)  Resp Rate (Set): 24 breaths/min  Tidal Volume (Set, mL): 500 mL  PEEP (cm H2O): 8 cmH2O  Resp: (!) 0      Skin Assessment: done    Plan: maintain.    RT Phillip on 11/12/2022 at 11:42 AM

## 2022-11-12 NOTE — H&P
[facility]   Critical Care History and Physical  Blanco Osborne   MRN: [id number]  : 1965  Date of Admission:2022  Primary care provider: Ki Powers [phone]  ___________________________________      Chief Complaint   Indication for critical care admission: Cardiac Arrest  Assessment & Plan    Blanco Osborne is a 57 year old White male with a past medical history significant for cirrhosis, NAFLD s/p transplant  (2016) who presents with cardiac arrest and respiratory failure    Neurological/Psychological    **Sedation: propofol  **Pain/Agitation: fentanyl    Post-Arrest Anoxic Brain Injury  TME  Cardiac arrest 2022. Bystander CPR by fire department started immediately, ~ 20min CPR all PEA then ROSC; however, patient was intubated in stomach during transport (unclear how much time).  * CT Head (2022): GW diff satisfactory, no edema, no acute process  * Poor Neuro exam on admit: hypothermia protocol started   - NCC consulted   - TTM 33C   - MRI per NCC   - cooling protocol    Cardiovascular    Out of Hospital Cardiac Arrest  PEA arrest  Cardiogenic Shock  Witnessed with 20min CPR, PEA. 2x epi and ROSC achieved. Not responsive on admission. TTM initiated, goal 33. Patient felt unwell with cold symptoms prior to arrest, likely respiratory arrest.   * concerned patient intravascularly depleted but retaining fluid in lungs/abdomen 2/2 low oncotic pressure. Pending Echo, may give gentle albumin bolus if UOP still poor   - Echo   - 2L bolus in ER   - frequent fluid assessments   - NE + Vaso on admission   - trend UOP closely   - potential need for albumin bolus   - MP 60mg QID for ARDS / Sepsis indication    Pulmonary    Acute Hypoxic Respiratory Failure:   Pneumonia  B/L pleural effusions  ARDS  Patient feeling unwell prior to arrest (cold symptoms), productive cough. Effusions likely 2/2 arrest and time with esophageal intubation.   * COVID/Flu: negative  * CT Chest  (11/12): Large R, mod L pleural effusions   - may need bronch/thora if patient not rapidly improving   - prone if PF still < 150 in the AM   - MP 60mg QID for ARDS / Sepsis indication    Renal/Fluids/Electrolytes    LORETTA:   * Cr on admission 5.  * Still making urine on admission, follow closely, electrolytes appropriate for now   - trend Cr   - gentle fluid bolus as tolerated    Lactic Acidosis: >10 on admission,    - continue to trend   - most recent 6    Plan  - monitor function and electrolytes as needed with replacement per ICU protocols. - generally avoid nephrotoxic agents such as NSAID, IV contrast unless specifically required  - adjust medications as needed for renal clearance  - follow I/O's as appropriate.    **I/O last 24hrs: No intake or output data in the 24 hours ending 11/12/22 1505    Infectious Diseases    Sepsis / Pneumonia:   Cold symptoms with productive cough prior to admission. Flu/Covid negative.    - Vanc/Zosyn   - RVP pending   - procal pending    - Will consider bronchoscopy if not improving    Gastrointestinal/Genitourinary    NAFLD  S/p Liver Transplant 9/2016  On tacrolimus for immunosuppression. Unclear when last dose was.   - held on admission given LORETTA   - tacro level now   - restart when able    **Nutrition: NPO now, TF likely start in AM    Endocrine/Metabolic    Stress induced hyperglycemia.  Plan  - ICU insulin protocol, goal sugar <180    Hypoglycemia on admission:   - Replaced D50   - monitor closely      Hematology/Oncology    Leukocytosis: 2/2 sepsis, unclear source on admission and cardiac arrest   - trend    ------------------------------------------------------------------------------------------------------------------  Prophylaxis/Daily Checklist    -DVT: Mechanical    -VAP: HOB 30 degrees, chlorhexidine rinse    -Stress Ulcer: PPI    -Restraints: Nonviolent soft two point restraints required and necessary for patient safety and continued cares and good effect as patient  continues to pull at necessary lines, tubes despite education and distraction. Will readdress daily.     -IV Access: Central access required and necessary for continued patient cares     -Feeding - NPO    Code status: Full  Disposition: ICU  =========================================================  I have personally reviewed the daily labs, imaging studies, cultures and discussed the case with referring physician and consulting physicians.     This patient is critically ill and I have provided 70 minutes of critical care time (excluding procedures) on 11/12/2022    Kian Hassan MD  Pulmonary & Critical Care Instructor  Department of Medicine  Division of Pulmonary, Allergy, Critical Care and Sleep Medicine   [facility], [facility]  [phone] (Text Page)     Clinically Significant Risk Factors Present on Admission           # Hypercalcemia: corrected calcium is >10.1, will monitor as appropriate    # Hypoalbuminemia: Lowest albumin = 2.3 g/dL (Ref range: 3.5-5.2) at 11/12/2022  3:02 PM, will monitor as appropriate   # Coagulation Defect: INR = 1.74 (Ref range: 0.85 - 1.15) and/or PTT = 53 Seconds (Ref range: 22 - 38 Seconds), will monitor for bleeding   # Acute Respiratory Failure: Documented O2 saturation < 91%.  Continue supplemental oxygen as needed  # Circulatory Shock: currently requiring pressors for blood pressure support   # Obesity: Estimated body mass index is 30.2 kg/m  as calculated from the following:    Height as of this encounter: 1.829 m (6').    Weight as of this encounter: 101 kg (222 lb 10.6 oz).        Primary Care Physician   Ki Powers  History of Present Illness   Unable to obtain a history from the patient due to mental status, intubation and sedation and review of the EMR.    Blanco Osborne is a 57 year old White male with a past medical history significant for cirrhosis, NAFLD s/p transplant  (9/2016) who presents with cardiac arrest and respiratory  failure    liver transplant 4 yr ago  sick 1 week ago... with a cold (congestion / cough) + fever  productive cough 3 days ago.   11/11 more lethargic and confused  11/12 in AM, convinced to call 911  In elevated went down, CPR right away with Fire Department for 10-15  2 doses epi, no shock  Intubated in rig ... found to be in the stomach  Cooled with coolguard  No urine for 24 hour before admission per wife  Glucose 44  Levo 0.35, 100%fio2  Zosyn / Vanc given  Posturing, no other sedation  EKG NSR without ST elevation    Past Medical History    Past Medical History:   Diagnosis Date     Ascites      Cirrhosis of liver with ascites (H) 2/11/2016     H/O alcohol abuse      HTN (hypertension)      NAFLD (nonalcoholic fatty liver disease) 2/11/2016     CHRISTIAN on CPAP      SBP (spontaneous bacterial peritonitis) (H)     [facility]     Tubular adenoma 10/2019    Large cecal adenoma- due for surveillance colonoscopy in 3 years (10/2022)     Patient Active Problem List    Diagnosis Date Noted     Respiratory arrest (H) 11/12/2022     Priority: Medium     Lactic acidosis 11/12/2022     Priority: Medium     Respiratory acidosis 11/12/2022     Priority: Medium     Acute renal failure, unspecified acute renal failure type (H) 11/12/2022     Priority: Medium     Immunosuppression (H) 09/28/2016     Priority: Medium     Ascites of liver 09/28/2016     Priority: Medium     Postoperative ileus (H) 09/28/2016     Priority: Medium     Acute kidney injury (H) 09/28/2016     Priority: Medium     Decreased flow of hepatic artery during surgery 09/28/2016     Priority: Medium     Liver transplanted (H) 09/21/2016     Priority: Medium     Trauma 09/21/2016     Priority: Medium     Liver transplant candidate 09/20/2016     Priority: Medium     Cirrhosis of liver with ascites (H) 02/11/2016     Priority: Medium     NAFLD (nonalcoholic fatty liver disease) 02/11/2016     Priority: Medium     SBP (spontaneous bacterial peritonitis) (H) 11/11/2015      Priority: Medium     Coagulopathy (H) 2015     Priority: Medium     Leukocytosis 2015     Priority: Medium     Pleural effusion 2015     Priority: Medium     Hyperlipidemia 2015     Priority: Medium     Hypertension 2015     Priority: Medium      Past Surgical History   Reviewed below  Past Surgical History:   Procedure Laterality Date     APPENDECTOMY       BENCH LIVER N/A 2016    Procedure: BENCH LIVER;  Surgeon: Enoc Crews MD;  Location: [facility] OR     COLONOSCOPY  13    repeat in      COLONOSCOPY N/A 10/4/2019    Procedure: COLONOSCOPY, WITH POLYPECTOMY AND BIOPSY;  Surgeon: Go Chong MD;  Location: [facility] OR     HERNIA REPAIR       TRANSPLANT LIVER RECIPIENT  DONOR N/A 2016    Procedure: TRANSPLANT LIVER RECIPIENT  DONOR;  Surgeon: Enoc Crews MD;  Location: [facility] OR      Social History   Social History     Socioeconomic History     Marital status:      Spouse name: Not on file     Number of children: Not on file     Years of education: Not on file     Highest education level: Not on file   Occupational History     Not on file   Tobacco Use     Smoking status: Never     Smokeless tobacco: Never   Substance and Sexual Activity     Alcohol use: Not Currently     Alcohol/week: 0.0 standard drinks     Drug use: No     Sexual activity: Not on file   Other Topics Concern     Parent/sibling w/ CABG, MI or angioplasty before 65F 55M? Not Asked   Social History Narrative     Not on file     Social Determinants of Health     Financial Resource Strain: Not on file   Food Insecurity: Not on file   Transportation Needs: Not on file   Physical Activity: Not on file   Stress: Not on file   Social Connections: Not on file   Intimate Partner Violence: Not on file   Housing Stability: Not on file    reviewed  Family History    No family history on file.    Allergies   No Known Allergies   Prior to Admission Medications    Prior to  Admission Medications   Prescriptions Last Dose Informant Patient Reported? Taking?   Cholecalciferol (VITAMIN D3) 5000 UNITS TBDP   Yes No   Sig: Reported on 4/25/2017   acetaminophen (TYLENOL) 500 MG tablet   Yes No   Sig: Take 1,000 mg by mouth every 6 hours as needed for mild pain   amLODIPine (NORVASC) 5 MG tablet   No No   Sig: TAKE 1 TABLET EVERY DAY   multivitamin  with lutein (OCUVITE WITH LTEIN) CAPS per capsule   Yes No   Sig: Take 1 capsule by mouth daily   order for DME   No No   Sig: Equipment being ordered: Class 2 (30-40mmHg) compression knee high stockings, night time knee high compression alternative, bandaging supplies   Patient not taking: Reported on 2/20/2019   order for DME   No No   Sig: Equipment being ordered: Compression knee high stockings for B LE lymphedema 20-30mmHg   Patient not taking: Reported on 2/20/2019   tacrolimus (GENERIC EQUIVALENT) 1 MG capsule   No No   Sig: TAKE 1 CAPSULE EVERY MORNING  AND TAKE 2 CAPSULES EVERY EVENING  DOSED  12  HOURS APART      Facility-Administered Medications: None       Current Facility-Administered Medications   Medication     albuterol (PROVENTIL HFA/VENTOLIN HFA) inhaler     artificial tears ophthalmic ointment     chlorhexidine (PERIDEX) 0.12 % solution 15 mL     glucose gel 15-30 g    Or     dextrose 50 % injection 25-50 mL    Or     glucagon injection 1 mg     fentaNYL (PF) (SUBLIMAZE) injection 25-50 mcg     fentaNYL (PF) (SUBLIMAZE) injection 50 mcg     lactated ringers BOLUS 500 mL     medication instruction     Medication Instructions     methylPREDNISolone sodium succinate (solu-MEDROL) injection 62.5 mg     metoprolol (LOPRESSOR) injection 5 mg     naloxone (NARCAN) injection 0.2 mg    Or     naloxone (NARCAN) injection 0.4 mg    Or     naloxone (NARCAN) injection 0.2 mg    Or     naloxone (NARCAN) injection 0.4 mg     norepinephrine (LEVOPHED) 4 mg in  mL infusion PREMIX     ondansetron (ZOFRAN ODT) ODT tab 4 mg    Or      ondansetron (ZOFRAN) injection 4 mg     [START ON 11/13/2022] pantoprazole (PROTONIX) 2 mg/mL suspension 40 mg    Or     [START ON 11/13/2022] pantoprazole (PROTONIX) IV push injection 40 mg     piperacillin-tazobactam (ZOSYN) 2.25 g vial to attach to  ml bag     prochlorperazine (COMPAZINE) injection 10 mg    Or     prochlorperazine (COMPAZINE) tablet 10 mg    Or     prochlorperazine (COMPAZINE) suppository 25 mg     propofol (DIPRIVAN) infusion     senna-docusate (SENOKOT-S/PERICOLACE) 8.6-50 MG per tablet 1 tablet    Or     senna-docusate (SENOKOT-S/PERICOLACE) 8.6-50 MG per tablet 2 tablet     sodium chloride 0.9% infusion     vancomycin place richards - receiving intermittent dosing     vasopressin 0.2 units/mL in NS (PITRESSIN) standard conc infusion     vecuronium (NORCURON) injection 3.88 mg     vecuronium (NORCURON) injection 7.76 mg        Review of Systems   Review of systems is not obtainable due to patient factors - mental status, intubation and sedation    Please see HPI. All other systems were reviewed and are found to be negative and non-contributory.   Physical Exam   /68   Pulse 61   Temp (!) 91.4  F (33  C) (Bladder)   Resp 16   Ht 1.829 m (6')   Wt 101 kg (222 lb 10.6 oz)   SpO2 96%   BMI 30.20 kg/m    Wt Readings from Last 1 Encounters:   11/12/22 101 kg (222 lb 10.6 oz)    Body mass index is 30.2 kg/m .     Vent Mode: CMV/AC  (Continuous Mandatory Ventilation/ Assist Control)  FiO2 (%): 100 %  Resp Rate (Set): 16 breaths/min  Tidal Volume (Set, mL): 400 mL  PEEP (cm H2O): 14 cmH2O  Resp: 16      General: no acute distress , intubated and sedated  HEENT: NCAT, trachea midline, sclera anicteric  Neck/Thyroid: supple  Lungs: unlabored synchronous with vent, rhonchi anteriorly    Cardiovascular:  RRR S1S2 no gallop,  No rub, no murmur  Abdomen: protuberant nontender, hypoactive bowel sounds, no mass  /Rectal: deferred  MSK: normal muscle bulk and tone  Skin: no obvious  rashes  Extremities: edema, warm  Neurological: withdrawals to pain    No intake or output data in the 24 hours ending 11/12/22 1505    Data   Labs & Studies of Note: I personally reviewed the following studies:    Imaging:   Recent Results (from the past 24 hour(s))   XR Chest Port 1 View    Narrative    EXAM: XR CHEST PORT 1 VIEW  LOCATION: [facility]  DATE/TIME: 11/12/2022 11:22 AM    INDICATION: cardiac arrest, post intubation  COMPARISON: 09/01/2020      Impression    IMPRESSION: Endotracheal tube is about 7.5 cm above the og and could be advanced by 1 to 2 cm. NG tube coursing below the left hemidiaphragm. Pacer pads over the left chest. Moderate bilateral pleural effusions and bibasilar atelectasis. No   pneumothorax. The cardiac and mediastinal silhouettes are within normal limits.   Head CT w/o contrast    Narrative    EXAM: CT HEAD W/O CONTRAST  LOCATION: [facility]  DATE/TIME: 11/12/2022 2:27 PM    INDICATION: Cardiac arrest; altered mental state  COMPARISON: None.  TECHNIQUE: Routine CT Head without IV contrast. Multiplanar reformats. Dose reduction techniques were used.    FINDINGS:  INTRACRANIAL CONTENTS: No intracranial hemorrhage, extraaxial collection, or mass effect.  No CT evidence of acute infarct. Mild presumed chronic small vessel ischemic changes. Mild generalized volume loss. No hydrocephalus. Corpus callosum is normal.   Position of the cerebral tonsils is satisfactory. Sella shows no acute abnormality. Vascular calcification.     VISUALIZED ORBITS/SINUSES/MASTOIDS: No intraorbital abnormality. Mild to moderate mucosal thickening scattered about the paranasal sinuses. Patient is intubated nasally and orally. Trace fluid mastoid air cells bilaterally.    BONES/SOFT TISSUES: Overall mineralization is satisfactory. No fracture of the calvarium or skull base is noted. No swelling of the facial or scalp soft tissues.      Impression     IMPRESSION:  1.  No acute process intracranially.    2.  Patient is intubated nasally and orally.    3.  No intracranial mass, mass effect, or hemorrhage.    4.  Gray-white differentiation is satisfactory with no convincing evidence for cerebral edema.    5.  Partial opacification paranasal sinuses. This may be on an inflammatory basis.   CT Chest Abdomen Pelvis w/o Contrast    Narrative    EXAM: CT CHEST ABDOMEN PELVIS W/O CONTRAST  LOCATION: [facility]  DATE/TIME: 11/12/2022 2:27 PM    INDICATION: Cardiac arrest  COMPARISON: PET CT on 09/17/2020 and chest x-ray earlier today  TECHNIQUE: CT scan of the chest, abdomen, and pelvis was performed without IV contrast. Multiplanar reformats were obtained. Dose reduction techniques were used.   CONTRAST: None.    FINDINGS:   LUNGS AND PLEURA: Large right and moderate left pleural effusion and dense consolidation/atelectasis in the lung bases. The endotracheal tube is in the midtrachea. The central tracheobronchial tree is patent.    MEDIASTINUM/AXILLAE: Several small bilateral supraclavicular lymph nodes, indeterminate. No lymphadenopathy in the axillae or mediastinum. No thoracic aortic aneurysm. Mild generalized cardiomegaly. Small pericardial effusion.    CORONARY ARTERY CALCIFICATION: Moderate.    HEPATOBILIARY: Postoperative changes of disease donor liver transplant. Cholecystectomy. No definite focal masses in the hepatic allograft on noncontrast CT.    PANCREAS: No duct dilatation or peripancreatic changes. No definite masses on noncontrast CT.    SPLEEN: Stable mild splenomegaly.    ADRENAL GLANDS: Mild diffuse thickening of the adrenal glands.    KIDNEYS/BLADDER: Small nonobstructing bilateral renal calculi measuring 3-4 mm. No hydronephrosis or perinephric stranding bilaterally. No renal masses evident on noncontrast CT. Nixon catheter decompresses the urinary bladder.    BOWEL: NG tube is in the stomach. No small bowel or colonic  obstruction or inflammatory changes.    LYMPH NODES: No lymphadenopathy.    VASCULATURE: No abdominal aortic aneurysm.    PELVIC ORGANS: No pelvic masses.    OTHER: Moderate ascites. Mild anasarca. No free air or fluid collections. Right common femoral central venous catheter with tip in the mid IVC, at the level of the renal vein confluence.    MUSCULOSKELETAL: Old healed rib fracture deformities. No suspicious lesions in the bones.      Impression    IMPRESSION:  1.  Large right and moderate left pleural effusion and bibasilar dense atelectasis/consolidation.  2.  Moderate ascites.  3.  Stable appearance of the hepatic allograft on noncontrast CT.  4.  Stable mild splenomegaly.  5.  Mild generalized cardiac enlargement and small pericardial effusion.  6.  Numerous small supraclavicular lymph nodes are nonspecific and likely reactive.   XR Chest Port 1 View    Narrative    EXAM: XR CHEST PORTABLE 1 VIEW  LOCATION: [facility]  DATE/TIME: 11/12/2022, 3:15 PM    INDICATION: Endotracheal tube positioning.  COMPARISON: Chest x-rays from 11:23 AM on 11/12/2022.    FINDINGS: Lungs are hypoaerated. Bibasilar opacities are again noted and likely represent pleural fluid collections and atelectasis and/or effusions. Cardiac silhouette appears enlarged, even given technique. No obvious pneumothorax is identified.    There is limited evaluation of the tubes and lines in the mediastinum due to the multiple cardiac leads projected over the chest and due to the multiplicity of tubes projected over the mediastinum. Enteric sump is projected over the mediastinum extending   off the inferior aspect of the image. Probable thermistor probe in the esophagus is noted. Distal aspect may be at the gastroesophageal junction or in the upper aspect of the stomach. There is an endotracheal tube which appears to have been slightly   advanced since the prior study and now is approximately 5 cm above the og as  opposed to 7.5 cm above the og on the prior study. Again, the tip of this tube is difficult to evaluate due to the other tubes and lines projected over this region.      Impression    IMPRESSION:   1.  Endotracheal tube tip appears to be approximately 5.1 cm above the og. It is somewhat difficult to evaluate due to the overlying cardiac leads, enteric sump, and enteric thermistor probe.  2.  Bibasilar opacities likely represent atelectasis, infiltrates and/or effusions.  3.  Cardiomegaly is suggested.  4.  Interval removal of external pacer patch projected over the left lower chest.  5.  No other changes.       ROUTINE ICU LABS (Last four results)  ROUTINE ICU LABS (Last four results)  CMP  Recent Labs   Lab 11/12/22  1559 11/12/22  1502 11/12/22  1209 11/12/22  1147   NA  --  137  --  138   POTASSIUM  --  3.7  --  3.6   CHLORIDE  --  102  --  100   CO2  --  21  --  21   ANIONGAP  --  14  --  17*   GLC 87 100* 173* 50*   BUN  --  82*  --  81*   CR  --  4.68*  --  5.06*   GFRESTIMATED  --  14*  --  12*   KELLY  --  8.4*  --  9.2   PROTTOTAL  --  7.0  --  7.4   ALBUMIN  --  2.3*  --  2.4*   BILITOTAL  --  1.9*  --  1.5*   ALKPHOS  --  187*  --  223*   AST  --  86*  --  61*   ALT  --  34  --  27     CBC  Recent Labs   Lab 11/12/22  1502 11/12/22  1147   WBC 18.7* 17.2*   RBC 3.40* 3.54*   HGB 11.3* 11.6*   HCT 35.6* 40.0   * 113*   MCH 33.2* 32.8   MCHC 31.7 29.0*   RDW 14.8 14.8    237     INR  Recent Labs   Lab 11/12/22  1502 11/12/22  1144   INR 1.74* 1.72*     Arterial Blood Gas  Recent Labs   Lab 11/12/22  1502 11/12/22  1233 11/12/22  1141   PH 7.14* 7.11*  --    PCO2 58* 63*  --    PO2 83 69*  --    HCO3 20* 20*  --    O2PER 100 100 100       Inflammatory Markers  No lab results found.  Immune Globulin Studies  No lab results found.    Microbiology:  Culture Micro   Date Value Ref Range Status   09/28/2016 No VRE isolated  Final   09/23/2016 No growth  Final   09/20/2016   Final    Culture  negative for acid fast bacilli  Assayed at [facility] [address]     09/20/2016 No anaerobes isolated  Final   09/20/2016 No growth  Final   09/20/2016 Culture negative after 4 weeks  Final     Recent Labs   Lab Test 09/28/16  1600 09/23/16  1458 09/21/16  0034 09/20/16  1901   CULT No VRE isolated No growth  --  Culture negative for acid fast bacilli  Assayed at [facility] [address]    Culture negative after 4 weeks  No anaerobes isolated  No growth   SDES Rectal Catheterized Urine Nares Ascites  Ascites  Ascites  Ascites  Ascites  Ascites       Urine Studies:    Recent Labs   Lab Test 10/31/17  1038 09/24/16  1055 09/23/16  1458   LEUKEST Negative Moderate* Large*   NITRITE Negative Negative Negative   WBCU 1 13* 103*   RBCU <1 38* >182*

## 2022-11-12 NOTE — ED TRIAGE NOTES
Patient comes in via EMS from his apartment.  He has been complaining of feeling unwell and complaining of SOB per family.  911 was called at 10:11 this morning.  When the FD arrived the patient was awake and talking, they began getting him into the elevator to bring him down.  He collapsed in the elevator and didn't have a pulse.  CPR was started and done for about 20 minutes prior to EMS arrival.  When EMS arrived, an IO was placed and he received a total of 2mg of epinephrine and 1 amp of bicarb.  Patient was initially in PEA and got ROSC with no shocks.  Patient was ET intubated with a 7.5cm tube by EMS.  Patient arrived with a pulse, intermittent EKG, no obvious stemi. He was 35% with the ambu bag.

## 2022-11-13 ENCOUNTER — APPOINTMENT (OUTPATIENT)
Dept: GENERAL RADIOLOGY | Facility: CLINIC | Age: 57
DRG: 004 | End: 2022-11-13
Attending: STUDENT IN AN ORGANIZED HEALTH CARE EDUCATION/TRAINING PROGRAM
Payer: COMMERCIAL

## 2022-11-13 ENCOUNTER — APPOINTMENT (OUTPATIENT)
Dept: CARDIOLOGY | Facility: CLINIC | Age: 57
DRG: 004 | End: 2022-11-13
Attending: STUDENT IN AN ORGANIZED HEALTH CARE EDUCATION/TRAINING PROGRAM
Payer: COMMERCIAL

## 2022-11-13 ENCOUNTER — HOSPITAL ENCOUNTER (INPATIENT)
Dept: NEUROLOGY | Facility: CLINIC | Age: 57
Discharge: HOME | DRG: 004 | End: 2022-11-13
Attending: NURSE PRACTITIONER
Payer: COMMERCIAL

## 2022-11-13 LAB
ALBUMIN SERPL-MCNC: 2 G/DL (ref 3.4–5)
ALBUMIN SERPL-MCNC: 2 G/DL (ref 3.4–5)
ALP SERPL-CCNC: 127 U/L (ref 40–150)
ALT SERPL W P-5'-P-CCNC: 36 U/L (ref 0–70)
ANION GAP SERPL CALCULATED.3IONS-SCNC: 14 MMOL/L (ref 3–14)
ANION GAP SERPL CALCULATED.3IONS-SCNC: 17 MMOL/L (ref 3–14)
APTT PPP: 40 SECONDS (ref 22–38)
APTT PPP: 41 SECONDS (ref 22–38)
AST SERPL W P-5'-P-CCNC: 80 U/L (ref 0–45)
BASE EXCESS BLDA CALC-SCNC: -7.4 MMOL/L (ref -9–1.8)
BASOPHILS # BLD MANUAL: 0 10E3/UL (ref 0–0.2)
BASOPHILS NFR BLD MANUAL: 0 %
BILIRUB DIRECT SERPL-MCNC: 2.3 MG/DL (ref 0–0.2)
BILIRUB SERPL-MCNC: 2.8 MG/DL (ref 0.2–1.3)
BUN SERPL-MCNC: 82 MG/DL (ref 7–30)
BUN SERPL-MCNC: 83 MG/DL (ref 7–30)
CA-I BLD-MCNC: 4.4 MG/DL (ref 4.4–5.2)
CA-I BLD-MCNC: 4.6 MG/DL (ref 4.4–5.2)
CALCIUM SERPL-MCNC: 8.3 MG/DL (ref 8.5–10.1)
CALCIUM SERPL-MCNC: 8.6 MG/DL (ref 8.5–10.1)
CHLORIDE BLD-SCNC: 102 MMOL/L (ref 94–109)
CHLORIDE BLD-SCNC: 103 MMOL/L (ref 94–109)
CO2 SERPL-SCNC: 18 MMOL/L (ref 20–32)
CO2 SERPL-SCNC: 21 MMOL/L (ref 20–32)
CREAT SERPL-MCNC: 4.44 MG/DL (ref 0.66–1.25)
CREAT SERPL-MCNC: 4.74 MG/DL (ref 0.66–1.25)
ENTEROCOCCUS FAECALIS: NOT DETECTED
ENTEROCOCCUS FAECIUM: NOT DETECTED
EOSINOPHIL # BLD MANUAL: 0 10E3/UL (ref 0–0.7)
EOSINOPHIL NFR BLD MANUAL: 0 %
ERYTHROCYTE [DISTWIDTH] IN BLOOD BY AUTOMATED COUNT: 14.8 % (ref 10–15)
FIBRINOGEN PPP-MCNC: 673 MG/DL (ref 170–490)
FIBRINOGEN PPP-MCNC: 768 MG/DL (ref 170–490)
FRAGMENTS BLD QL SMEAR: SLIGHT
GFR SERPL CREATININE-BSD FRML MDRD: 13 ML/MIN/1.73M2
GFR SERPL CREATININE-BSD FRML MDRD: 15 ML/MIN/1.73M2
GLUCOSE BLD-MCNC: 138 MG/DL (ref 70–99)
GLUCOSE BLD-MCNC: 152 MG/DL (ref 70–99)
GLUCOSE BLDC GLUCOMTR-MCNC: 105 MG/DL (ref 70–99)
GLUCOSE BLDC GLUCOMTR-MCNC: 113 MG/DL (ref 70–99)
GLUCOSE BLDC GLUCOMTR-MCNC: 113 MG/DL (ref 70–99)
GLUCOSE BLDC GLUCOMTR-MCNC: 127 MG/DL (ref 70–99)
HCO3 BLD-SCNC: 20 MMOL/L (ref 21–28)
HCT VFR BLD AUTO: 36 % (ref 40–53)
HGB BLD-MCNC: 11.3 G/DL (ref 13.3–17.7)
INR PPP: 1.79 (ref 0.85–1.15)
INR PPP: 1.84 (ref 0.85–1.15)
LACTATE SERPL-SCNC: 2 MMOL/L (ref 0.7–2)
LISTERIA SPECIES (DETECTED/NOT DETECTED): NOT DETECTED
LVEF ECHO: NORMAL
LYMPHOCYTES # BLD MANUAL: 0.8 10E3/UL (ref 0.8–5.3)
LYMPHOCYTES NFR BLD MANUAL: 5 %
MAGNESIUM SERPL-MCNC: 1.8 MG/DL (ref 1.6–2.3)
MAGNESIUM SERPL-MCNC: 2 MG/DL (ref 1.6–2.3)
MCH RBC QN AUTO: 32.8 PG (ref 26.5–33)
MCHC RBC AUTO-ENTMCNC: 31.4 G/DL (ref 31.5–36.5)
MCV RBC AUTO: 105 FL (ref 78–100)
MONOCYTES # BLD MANUAL: 0.8 10E3/UL (ref 0–1.3)
MONOCYTES NFR BLD MANUAL: 5 %
NEUTROPHILS # BLD MANUAL: 14.7 10E3/UL (ref 1.6–8.3)
NEUTROPHILS NFR BLD MANUAL: 90 %
NRBC # BLD AUTO: 0.3 10E3/UL
NRBC BLD MANUAL-RTO: 2 %
O2/TOTAL GAS SETTING VFR VENT: 75 %
PCO2 BLD: 46 MM HG (ref 35–45)
PH BLD: 7.24 [PH] (ref 7.35–7.45)
PHOSPHATE SERPL-MCNC: 4.3 MG/DL (ref 2.5–4.5)
PHOSPHATE SERPL-MCNC: 4.3 MG/DL (ref 2.5–4.5)
PLAT MORPH BLD: ABNORMAL
PLAT MORPH BLD: ABNORMAL
PLATELET # BLD AUTO: 115 10E3/UL (ref 150–450)
PO2 BLD: 92 MM HG (ref 80–105)
POLYCHROMASIA BLD QL SMEAR: SLIGHT
POTASSIUM BLD-SCNC: 3.7 MMOL/L (ref 3.4–5.3)
POTASSIUM BLD-SCNC: 3.7 MMOL/L (ref 3.4–5.3)
PROCALCITONIN SERPL-MCNC: 29.06 NG/ML
PROT SERPL-MCNC: 6 G/DL (ref 6.8–8.8)
RBC # BLD AUTO: 3.44 10E6/UL (ref 4.4–5.9)
RBC MORPH BLD: ABNORMAL
RBC MORPH BLD: ABNORMAL
SMUDGE CELLS BLD QL SMEAR: PRESENT
SODIUM SERPL-SCNC: 137 MMOL/L (ref 133–144)
SODIUM SERPL-SCNC: 138 MMOL/L (ref 133–144)
STAPHYLOCOCCUS AUREUS: NOT DETECTED
STAPHYLOCOCCUS EPIDERMIDIS: NOT DETECTED
STAPHYLOCOCCUS LUGDUNENSIS: NOT DETECTED
STAPHYLOCOCCUS SPECIES: NOT DETECTED
STREPTOCOCCUS AGALACTIAE: NOT DETECTED
STREPTOCOCCUS ANGINOSUS GROUP: NOT DETECTED
STREPTOCOCCUS PNEUMONIAE: DETECTED
STREPTOCOCCUS PYOGENES: NOT DETECTED
TACROLIMUS BLD-MCNC: 3.2 UG/L (ref 5–15)
TACROLIMUS BLD-MCNC: 3.6 UG/L (ref 5–15)
TME LAST DOSE: ABNORMAL H
VANCOMYCIN SERPL-MCNC: 23 MG/L
WBC # BLD AUTO: 16.3 10E3/UL (ref 4–11)

## 2022-11-13 PROCEDURE — 82248 BILIRUBIN DIRECT: CPT | Performed by: INTERNAL MEDICINE

## 2022-11-13 PROCEDURE — 250N000011 HC RX IP 250 OP 636

## 2022-11-13 PROCEDURE — 250N000013 HC RX MED GY IP 250 OP 250 PS 637: Performed by: STUDENT IN AN ORGANIZED HEALTH CARE EDUCATION/TRAINING PROGRAM

## 2022-11-13 PROCEDURE — 250N000009 HC RX 250: Performed by: STUDENT IN AN ORGANIZED HEALTH CARE EDUCATION/TRAINING PROGRAM

## 2022-11-13 PROCEDURE — 258N000003 HC RX IP 258 OP 636: Performed by: INTERNAL MEDICINE

## 2022-11-13 PROCEDURE — 94003 VENT MGMT INPAT SUBQ DAY: CPT

## 2022-11-13 PROCEDURE — 99221 1ST HOSP IP/OBS SF/LOW 40: CPT | Performed by: INTERNAL MEDICINE

## 2022-11-13 PROCEDURE — 99291 CRITICAL CARE FIRST HOUR: CPT | Mod: 25 | Performed by: PSYCHIATRY & NEUROLOGY

## 2022-11-13 PROCEDURE — C9113 INJ PANTOPRAZOLE SODIUM, VIA: HCPCS | Performed by: STUDENT IN AN ORGANIZED HEALTH CARE EDUCATION/TRAINING PROGRAM

## 2022-11-13 PROCEDURE — 84100 ASSAY OF PHOSPHORUS: CPT | Performed by: STUDENT IN AN ORGANIZED HEALTH CARE EDUCATION/TRAINING PROGRAM

## 2022-11-13 PROCEDURE — 95720 EEG PHY/QHP EA INCR W/VEEG: CPT | Performed by: PSYCHIATRY & NEUROLOGY

## 2022-11-13 PROCEDURE — 93325 DOPPLER ECHO COLOR FLOW MAPG: CPT

## 2022-11-13 PROCEDURE — 80202 ASSAY OF VANCOMYCIN: CPT | Performed by: STUDENT IN AN ORGANIZED HEALTH CARE EDUCATION/TRAINING PROGRAM

## 2022-11-13 PROCEDURE — 258N000003 HC RX IP 258 OP 636: Performed by: STUDENT IN AN ORGANIZED HEALTH CARE EDUCATION/TRAINING PROGRAM

## 2022-11-13 PROCEDURE — 93321 DOPPLER ECHO F-UP/LMTD STD: CPT | Mod: 26 | Performed by: INTERNAL MEDICINE

## 2022-11-13 PROCEDURE — 87040 BLOOD CULTURE FOR BACTERIA: CPT | Performed by: STUDENT IN AN ORGANIZED HEALTH CARE EDUCATION/TRAINING PROGRAM

## 2022-11-13 PROCEDURE — 93321 DOPPLER ECHO F-UP/LMTD STD: CPT

## 2022-11-13 PROCEDURE — 99291 CRITICAL CARE FIRST HOUR: CPT | Mod: 25 | Performed by: STUDENT IN AN ORGANIZED HEALTH CARE EDUCATION/TRAINING PROGRAM

## 2022-11-13 PROCEDURE — 200N000001 HC R&B ICU

## 2022-11-13 PROCEDURE — 85610 PROTHROMBIN TIME: CPT | Performed by: STUDENT IN AN ORGANIZED HEALTH CARE EDUCATION/TRAINING PROGRAM

## 2022-11-13 PROCEDURE — 93308 TTE F-UP OR LMTD: CPT | Mod: 26 | Performed by: INTERNAL MEDICINE

## 2022-11-13 PROCEDURE — 82330 ASSAY OF CALCIUM: CPT | Performed by: STUDENT IN AN ORGANIZED HEALTH CARE EDUCATION/TRAINING PROGRAM

## 2022-11-13 PROCEDURE — 87077 CULTURE AEROBIC IDENTIFY: CPT | Performed by: STUDENT IN AN ORGANIZED HEALTH CARE EDUCATION/TRAINING PROGRAM

## 2022-11-13 PROCEDURE — 5A1D90Z PERFORMANCE OF URINARY FILTRATION, CONTINUOUS, GREATER THAN 18 HOURS PER DAY: ICD-10-PCS | Performed by: INTERNAL MEDICINE

## 2022-11-13 PROCEDURE — 83735 ASSAY OF MAGNESIUM: CPT | Performed by: STUDENT IN AN ORGANIZED HEALTH CARE EDUCATION/TRAINING PROGRAM

## 2022-11-13 PROCEDURE — 36415 COLL VENOUS BLD VENIPUNCTURE: CPT | Performed by: STUDENT IN AN ORGANIZED HEALTH CARE EDUCATION/TRAINING PROGRAM

## 2022-11-13 PROCEDURE — 87486 CHLMYD PNEUM DNA AMP PROBE: CPT | Performed by: STUDENT IN AN ORGANIZED HEALTH CARE EDUCATION/TRAINING PROGRAM

## 2022-11-13 PROCEDURE — 250N000011 HC RX IP 250 OP 636: Performed by: INTERNAL MEDICINE

## 2022-11-13 PROCEDURE — 80053 COMPREHEN METABOLIC PANEL: CPT | Performed by: STUDENT IN AN ORGANIZED HEALTH CARE EDUCATION/TRAINING PROGRAM

## 2022-11-13 PROCEDURE — 999N000157 HC STATISTIC RCP TIME EA 10 MIN

## 2022-11-13 PROCEDURE — 83605 ASSAY OF LACTIC ACID: CPT | Performed by: STUDENT IN AN ORGANIZED HEALTH CARE EDUCATION/TRAINING PROGRAM

## 2022-11-13 PROCEDURE — 93010 ELECTROCARDIOGRAM REPORT: CPT | Performed by: INTERNAL MEDICINE

## 2022-11-13 PROCEDURE — 250N000009 HC RX 250: Performed by: INTERNAL MEDICINE

## 2022-11-13 PROCEDURE — 250N000011 HC RX IP 250 OP 636: Performed by: STUDENT IN AN ORGANIZED HEALTH CARE EDUCATION/TRAINING PROGRAM

## 2022-11-13 PROCEDURE — 85384 FIBRINOGEN ACTIVITY: CPT | Performed by: STUDENT IN AN ORGANIZED HEALTH CARE EDUCATION/TRAINING PROGRAM

## 2022-11-13 PROCEDURE — 82803 BLOOD GASES ANY COMBINATION: CPT | Performed by: STUDENT IN AN ORGANIZED HEALTH CARE EDUCATION/TRAINING PROGRAM

## 2022-11-13 PROCEDURE — 93005 ELECTROCARDIOGRAM TRACING: CPT

## 2022-11-13 PROCEDURE — 93325 DOPPLER ECHO COLOR FLOW MAPG: CPT | Mod: 26 | Performed by: INTERNAL MEDICINE

## 2022-11-13 PROCEDURE — 87070 CULTURE OTHR SPECIMN AEROBIC: CPT | Performed by: STUDENT IN AN ORGANIZED HEALTH CARE EDUCATION/TRAINING PROGRAM

## 2022-11-13 PROCEDURE — 999N000065 XR CHEST PORT 1 VIEW

## 2022-11-13 PROCEDURE — 85027 COMPLETE CBC AUTOMATED: CPT | Performed by: STUDENT IN AN ORGANIZED HEALTH CARE EDUCATION/TRAINING PROGRAM

## 2022-11-13 PROCEDURE — 85730 THROMBOPLASTIN TIME PARTIAL: CPT | Performed by: STUDENT IN AN ORGANIZED HEALTH CARE EDUCATION/TRAINING PROGRAM

## 2022-11-13 PROCEDURE — 95714 VEEG EA 12-26 HR UNMNTR: CPT

## 2022-11-13 PROCEDURE — 36556 INSERT NON-TUNNEL CV CATH: CPT | Performed by: STUDENT IN AN ORGANIZED HEALTH CARE EDUCATION/TRAINING PROGRAM

## 2022-11-13 PROCEDURE — 85007 BL SMEAR W/DIFF WBC COUNT: CPT | Performed by: STUDENT IN AN ORGANIZED HEALTH CARE EDUCATION/TRAINING PROGRAM

## 2022-11-13 RX ORDER — CALCIUM CHLORIDE, MAGNESIUM CHLORIDE, DEXTROSE MONOHYDRATE, LACTIC ACID, SODIUM CHLORIDE, SODIUM BICARBONATE AND POTASSIUM CHLORIDE 5.15; 2.03; 22; 5.4; 6.46; 3.09; .157 G/L; G/L; G/L; G/L; G/L; G/L; G/L
INJECTION INTRAVENOUS CONTINUOUS
Status: DISCONTINUED | OUTPATIENT
Start: 2022-11-13 | End: 2022-11-23 | Stop reason: CLARIF

## 2022-11-13 RX ORDER — HEPARIN SODIUM 5000 [USP'U]/.5ML
5000 INJECTION, SOLUTION INTRAVENOUS; SUBCUTANEOUS EVERY 8 HOURS
Status: DISCONTINUED | OUTPATIENT
Start: 2022-11-13 | End: 2022-11-14 | Stop reason: ALTCHOICE

## 2022-11-13 RX ORDER — CALCIUM GLUCONATE 20 MG/ML
2 INJECTION, SOLUTION INTRAVENOUS EVERY 8 HOURS PRN
Status: DISCONTINUED | OUTPATIENT
Start: 2022-11-13 | End: 2022-11-23 | Stop reason: CLARIF

## 2022-11-13 RX ORDER — CALCIUM CHLORIDE, MAGNESIUM CHLORIDE, SODIUM CHLORIDE, SODIUM BICARBONATE, POTASSIUM CHLORIDE AND SODIUM PHOSPHATE DIBASIC DIHYDRATE 3.68; 3.05; 6.34; 3.09; .314; .187 G/L; G/L; G/L; G/L; G/L; G/L
INJECTION INTRAVENOUS CONTINUOUS
Status: DISCONTINUED | OUTPATIENT
Start: 2022-11-13 | End: 2022-11-21

## 2022-11-13 RX ORDER — VANCOMYCIN HYDROCHLORIDE 500 MG/10ML
500 INJECTION, POWDER, LYOPHILIZED, FOR SOLUTION INTRAVENOUS ONCE
Status: COMPLETED | OUTPATIENT
Start: 2022-11-13 | End: 2022-11-13

## 2022-11-13 RX ORDER — MAGNESIUM SULFATE HEPTAHYDRATE 40 MG/ML
2 INJECTION, SOLUTION INTRAVENOUS EVERY 8 HOURS PRN
Status: DISCONTINUED | OUTPATIENT
Start: 2022-11-13 | End: 2022-11-23 | Stop reason: CLARIF

## 2022-11-13 RX ORDER — POTASSIUM CHLORIDE 29.8 MG/ML
20 INJECTION INTRAVENOUS EVERY 8 HOURS PRN
Status: DISCONTINUED | OUTPATIENT
Start: 2022-11-13 | End: 2022-11-23 | Stop reason: CLARIF

## 2022-11-13 RX ORDER — PIPERACILLIN SODIUM, TAZOBACTAM SODIUM 4; .5 G/20ML; G/20ML
4.5 INJECTION, POWDER, LYOPHILIZED, FOR SOLUTION INTRAVENOUS EVERY 6 HOURS
Status: DISCONTINUED | OUTPATIENT
Start: 2022-11-13 | End: 2022-11-15

## 2022-11-13 RX ADMIN — SODIUM CHLORIDE 1000 ML: 9 INJECTION, SOLUTION INTRAVENOUS at 16:04

## 2022-11-13 RX ADMIN — CALCIUM CHLORIDE, MAGNESIUM CHLORIDE, DEXTROSE MONOHYDRATE, LACTIC ACID, SODIUM CHLORIDE, SODIUM BICARBONATE AND POTASSIUM CHLORIDE 5000 ML: 5.15; 2.03; 22; 5.4; 6.46; 3.09; .157 INJECTION INTRAVENOUS at 15:50

## 2022-11-13 RX ADMIN — CHLORHEXIDINE GLUCONATE 0.12% ORAL RINSE 15 ML: 1.2 LIQUID ORAL at 19:47

## 2022-11-13 RX ADMIN — PROPOFOL 50 MCG/KG/MIN: 10 INJECTION, EMULSION INTRAVENOUS at 12:10

## 2022-11-13 RX ADMIN — METHYLPREDNISOLONE SODIUM SUCCINATE 62.5 MG: 125 INJECTION, POWDER, FOR SOLUTION INTRAMUSCULAR; INTRAVENOUS at 05:20

## 2022-11-13 RX ADMIN — CALCIUM CHLORIDE, MAGNESIUM CHLORIDE, DEXTROSE MONOHYDRATE, LACTIC ACID, SODIUM CHLORIDE, SODIUM BICARBONATE AND POTASSIUM CHLORIDE: 5.15; 2.03; 22; 5.4; 6.46; 3.09; .157 INJECTION INTRAVENOUS at 15:54

## 2022-11-13 RX ADMIN — PROPOFOL 50 MCG/KG/MIN: 10 INJECTION, EMULSION INTRAVENOUS at 18:33

## 2022-11-13 RX ADMIN — CALCIUM CHLORIDE, MAGNESIUM CHLORIDE, SODIUM CHLORIDE, SODIUM BICARBONATE, POTASSIUM CHLORIDE AND SODIUM PHOSPHATE DIBASIC DIHYDRATE 5000 ML: 3.68; 3.05; 6.34; 3.09; .314; .187 INJECTION INTRAVENOUS at 15:48

## 2022-11-13 RX ADMIN — PROPOFOL 50 MCG/KG/MIN: 10 INJECTION, EMULSION INTRAVENOUS at 15:23

## 2022-11-13 RX ADMIN — HEPARIN SODIUM 5000 UNITS: 5000 INJECTION, SOLUTION INTRAVENOUS; SUBCUTANEOUS at 20:35

## 2022-11-13 RX ADMIN — PROPOFOL 50 MCG/KG/MIN: 10 INJECTION, EMULSION INTRAVENOUS at 02:54

## 2022-11-13 RX ADMIN — PROPOFOL 50 MCG/KG/MIN: 10 INJECTION, EMULSION INTRAVENOUS at 09:00

## 2022-11-13 RX ADMIN — PIPERACILLIN AND TAZOBACTAM 4.5 G: 4; .5 INJECTION, POWDER, FOR SOLUTION INTRAVENOUS at 22:53

## 2022-11-13 RX ADMIN — CHLORHEXIDINE GLUCONATE 0.12% ORAL RINSE 15 ML: 1.2 LIQUID ORAL at 09:52

## 2022-11-13 RX ADMIN — PIPERACILLIN AND TAZOBACTAM 2.25 G: 2; .25 INJECTION, POWDER, FOR SOLUTION INTRAVENOUS at 16:45

## 2022-11-13 RX ADMIN — PIPERACILLIN AND TAZOBACTAM 2.25 G: 2; .25 INJECTION, POWDER, FOR SOLUTION INTRAVENOUS at 10:33

## 2022-11-13 RX ADMIN — NOREPINEPHRINE BITARTRATE 4 MG/250 ML (16 MCG/ML) IN 0.9 % NACL IV 0.08 MCG/KG/MIN: at 06:18

## 2022-11-13 RX ADMIN — MAGNESIUM SULFATE HEPTAHYDRATE 2 G: 40 INJECTION, SOLUTION INTRAVENOUS at 17:55

## 2022-11-13 RX ADMIN — NOREPINEPHRINE BITARTRATE 4 MG/250 ML (16 MCG/ML) IN 0.9 % NACL IV 0.12 MCG/KG/MIN: at 11:49

## 2022-11-13 RX ADMIN — PROPOFOL 50 MCG/KG/MIN: 10 INJECTION, EMULSION INTRAVENOUS at 21:28

## 2022-11-13 RX ADMIN — PIPERACILLIN AND TAZOBACTAM 2.25 G: 2; .25 INJECTION, POWDER, FOR SOLUTION INTRAVENOUS at 05:20

## 2022-11-13 RX ADMIN — FENTANYL CITRATE 50 MCG: 50 INJECTION INTRAMUSCULAR; INTRAVENOUS at 14:57

## 2022-11-13 RX ADMIN — CALCIUM CHLORIDE, MAGNESIUM CHLORIDE, SODIUM CHLORIDE, SODIUM BICARBONATE, POTASSIUM CHLORIDE AND SODIUM PHOSPHATE DIBASIC DIHYDRATE 5000 ML: 3.68; 3.05; 6.34; 3.09; .314; .187 INJECTION INTRAVENOUS at 18:46

## 2022-11-13 RX ADMIN — VASOPRESSIN 2.4 UNITS/HR: 20 INJECTION INTRAVENOUS at 00:28

## 2022-11-13 RX ADMIN — METHYLPREDNISOLONE SODIUM SUCCINATE 62.5 MG: 125 INJECTION, POWDER, FOR SOLUTION INTRAMUSCULAR; INTRAVENOUS at 15:23

## 2022-11-13 RX ADMIN — CALCIUM CHLORIDE, MAGNESIUM CHLORIDE, SODIUM CHLORIDE, SODIUM BICARBONATE, POTASSIUM CHLORIDE AND SODIUM PHOSPHATE DIBASIC DIHYDRATE 5000 ML: 3.68; 3.05; 6.34; 3.09; .314; .187 INJECTION INTRAVENOUS at 20:44

## 2022-11-13 RX ADMIN — MINERAL OIL, PETROLATUM: 425; 573 OINTMENT OPHTHALMIC at 07:48

## 2022-11-13 RX ADMIN — MINERAL OIL, PETROLATUM: 425; 573 OINTMENT OPHTHALMIC at 15:35

## 2022-11-13 RX ADMIN — NOREPINEPHRINE BITARTRATE 4 MG/250 ML (16 MCG/ML) IN 0.9 % NACL IV 0.15 MCG/KG/MIN: at 18:10

## 2022-11-13 RX ADMIN — NOREPINEPHRINE BITARTRATE 4 MG/250 ML (16 MCG/ML) IN 0.9 % NACL IV 0.11 MCG/KG/MIN: at 00:15

## 2022-11-13 RX ADMIN — SODIUM CHLORIDE 1000 ML: 9 INJECTION, SOLUTION INTRAVENOUS at 16:05

## 2022-11-13 RX ADMIN — NOREPINEPHRINE BITARTRATE 4 MG/250 ML (16 MCG/ML) IN 0.9 % NACL IV 0.23 MCG/KG/MIN: at 21:29

## 2022-11-13 RX ADMIN — PANTOPRAZOLE SODIUM 40 MG: 40 INJECTION, POWDER, FOR SOLUTION INTRAVENOUS at 07:44

## 2022-11-13 RX ADMIN — MINERAL OIL, PETROLATUM: 425; 573 OINTMENT OPHTHALMIC at 00:15

## 2022-11-13 RX ADMIN — CALCIUM CHLORIDE, MAGNESIUM CHLORIDE, DEXTROSE MONOHYDRATE, LACTIC ACID, SODIUM CHLORIDE, SODIUM BICARBONATE AND POTASSIUM CHLORIDE 5000 ML: 5.15; 2.03; 22; 5.4; 6.46; 3.09; .157 INJECTION INTRAVENOUS at 21:20

## 2022-11-13 RX ADMIN — METHYLPREDNISOLONE SODIUM SUCCINATE 62.5 MG: 125 INJECTION, POWDER, FOR SOLUTION INTRAMUSCULAR; INTRAVENOUS at 09:52

## 2022-11-13 RX ADMIN — PROPOFOL 50 MCG/KG/MIN: 10 INJECTION, EMULSION INTRAVENOUS at 05:25

## 2022-11-13 RX ADMIN — METHYLPREDNISOLONE SODIUM SUCCINATE 62.5 MG: 125 INJECTION, POWDER, FOR SOLUTION INTRAMUSCULAR; INTRAVENOUS at 21:06

## 2022-11-13 RX ADMIN — VANCOMYCIN HYDROCHLORIDE 500 MG: 500 INJECTION, POWDER, LYOPHILIZED, FOR SOLUTION INTRAVENOUS at 12:34

## 2022-11-13 RX ADMIN — VASOPRESSIN 2.4 UNITS/HR: 20 INJECTION INTRAVENOUS at 17:13

## 2022-11-13 ASSESSMENT — ACTIVITIES OF DAILY LIVING (ADL)
ADLS_ACUITY_SCORE: 47

## 2022-11-13 NOTE — PROGRESS NOTES
Pending sale to [facility] ICU RESPIRATORY NOTE        Date of Admission: 11/12/2022    Date of Intubation (most recent): 11/12/2022.     Reason for Mechanical Ventilation: Cardiac arrest and Respiratory Failure.    Number of Days on Mechanical Ventilation: Day 2.     Met Criteria for Spontaneous Breathing Trial: No.    Reason for No Spontaneous Breathing Trial: PEEP +14 cm H2O and 65% FiO2.     Significant Events Today: Sputum sample sent to lab for analysis.     ABG Results:   Recent Labs   Lab 11/13/22  0827 11/12/22 2011 11/12/22  1656 11/12/22  1502   PH 7.24* 7.21* 7.13* 7.14*   PCO2 46* 52* 62* 58*   PO2 92 72* 107* 83   HCO3 20* 21 20* 20*   O2PER 75 80 100 100       Current Vent Settings: Vent Mode: CMV/AC  (Continuous Mandatory Ventilation/ Assist Control)  FiO2 (%): 65 %  Resp Rate (Set): 20 breaths/min  Tidal Volume (Set, mL): 400 mL  PEEP (cm H2O): 14 cmH2O  Resp: 18      Plan: Continue to monitor patient on full ventilatory support. Assess for weaning daily.    Moises Martinez, RRT on 11/13/2022 at 5:36 PM

## 2022-11-13 NOTE — PROCEDURES
[facility]    Dialysis line    Date/Time: 11/13/2022 3:41 PM  Performed by: Kian Hassan MD  Authorized by: Kian Hassan MD   Indications: vascular access (CRRT)      UNIVERSAL PROTOCOL   Site Marked: Yes  Prior Images Obtained and Reviewed:  Yes  Required items: Required blood products, implants, devices and special equipment available    Patient identity confirmed:  Arm band and hospital-assigned identification number  Patient was reevaluated immediately before administering moderate or deep sedation or anesthesia  Confirmation Checklist:  Patient's identity using two indicators  Time out: Immediately prior to the procedure a time out was called    Universal Protocol: the Joint Commission Universal Protocol was followed    Preparation: Patient was prepped and draped in usual sterile fashion    ESBL (mL):  5     ANESTHESIA    Anesthesia: Local infiltration  Local Anesthetic:  Lidocaine 1% with epinephrine  Anesthetic Total (mL):  5      SEDATION  Patient Sedated: Yes    Sedation:  Fentanyl  Vital signs: Vital signs monitored during sedation      Preparation: skin prepped with 2% chlorhexidine  Skin prep agent dried: skin prep agent completely dried prior to procedure  Sterile barriers: all five maximum sterile barriers used - cap, mask, sterile gown, sterile gloves, and large sterile sheet  Hand hygiene: hand hygiene performed prior to central venous catheter insertion  Patient position: flat  Catheter type: double lumen  Catheter size: 12 Fr  Pre-procedure: landmarks identified  Ultrasound guidance: yes  Sterile ultrasound techniques: sterile gel and sterile probe covers were used  Number of attempts: 1  Successful placement: yes  Post-procedure: line sutured and dressing applied  Assessment: blood return through all ports and free fluid flow      PROCEDURE    Length of time physician/provider present for 1:1 monitoring during sedation: 30

## 2022-11-13 NOTE — CONSULTS
"      [facility]    Stroke Consult Note    Reason for Consult:  neuroprognostication s/p cardiac arrest     Chief Complaint: Cardiac Arrest       HPI  Blanco Osborne is a 57 year old male with PMH of nonalcoholic fatty liver disease s/o transplant (9/2016) and cirrhosis. He presented to the ED 11/12/22 for evaluation of cardiac arrest. He experienced SOB and weakness and called EMS; when firefighters were escorting him he collapsed and was found to be in cardiac arrest-CPR was immediately initiated and when EMS arrived 20 minutes later he was found to be in PEA treated with epi and bicarb and pulse returned. Family also noted that for the past week he had fever, fatigue, cough, confusion. Hypothermia protocol was initiated and he was admitted.      On exam, pupils are 3mm, equals and sluggishly reaction to light. Gaze gonjugate. Mild cough reflex with suctioning.     Evaluation Summarized  CT head 11/12: no evidence of cerebral edema or loss of gray-white differentiation     Impression  1. S/p cardiac arrest, currently under cooling protocol. Plan to rewarm starting this PM    Recommendations  -discussed with vascular neurology attending, Dr. Rodrigues   -Q2 hour neurochecks  -cEEG monitoring  -STAT head CT for change in neurological excam    Patient Follow-up    - final recommendation pending work-up    Thank you for this consult. We will continue to follow.     Martha JETER, CNP  Vascular Neurology  To page me or covering stroke neurology team member, click here: AMCOM   Choose \"On Call\" tab at top, then search dropdown box for \"Neurology Adult\", select location, press Enter, then look for stroke/neuro ICU/telestroke.  _____________________________________________________    Clinically Significant Risk Factors Present on Admission           # Hypercalcemia: corrected calcium is >10.1, will monitor as appropriate    # Hypoalbuminemia: Lowest albumin = 2.3 g/dL (Ref range: 3.5-5.2) at " 2022  3:02 PM, will monitor as appropriate  # Coagulation Defect: INR = 1.84 (Ref range: 0.85 - 1.15) and/or PTT = 41 Seconds (Ref range: 22 - 38 Seconds), will monitor for bleeding   # Coma: based on GCS score of <8       Past Medical History   Past Medical History:   Diagnosis Date     Ascites      Cirrhosis of liver with ascites (H) 2016     H/O alcohol abuse      HTN (hypertension)      NAFLD (nonalcoholic fatty liver disease) 2016     CHRISTIAN on CPAP      SBP (spontaneous bacterial peritonitis) (H)     MNGI     Tubular adenoma 10/2019    Large cecal adenoma- due for surveillance colonoscopy in 3 years (10/2022)     Past Surgical History   Past Surgical History:   Procedure Laterality Date     APPENDECTOMY       BENCH LIVER N/A 2016    Procedure: BENCH LIVER;  Surgeon: Enoc Crews MD;  Location: [facility] OR     COLONOSCOPY  13    repeat in 2018     COLONOSCOPY N/A 10/4/2019    Procedure: COLONOSCOPY, WITH POLYPECTOMY AND BIOPSY;  Surgeon: Go Chong MD;  Location: [facility] OR     HERNIA REPAIR       TRANSPLANT LIVER RECIPIENT  DONOR N/A 2016    Procedure: TRANSPLANT LIVER RECIPIENT  DONOR;  Surgeon: Enoc Crews MD;  Location: [facility] OR     Medications   Home Meds  Prior to Admission medications    Medication Sig Start Date End Date Taking? Authorizing Provider   acetaminophen (TYLENOL) 500 MG tablet Take 1,000 mg by mouth every 6 hours as needed for mild pain   Yes Reported, Patient   calcium carbonate (TUMS) 500 MG chewable tablet Take 1-2 chew tab by mouth 4 times daily as needed for heartburn   Yes Unknown, Entered By History   Cholecalciferol (VITAMIN D3 GUMMIES PO) Take 1 chew tab by mouth daily   Yes Unknown, Entered By History   famotidine (PEPCID) 20 MG tablet Take 20 mg by mouth 2 times daily as needed (heartburn)   Yes Unknown, Entered By History   furosemide (LASIX) 20 MG tablet Take 20 mg by mouth once   Yes Unknown, Entered By History    Homeopathic Products (SLEEP CALM SLEEP RELIEF SL) Take by mouth nightly as needed Magnesium to help with sleep   Yes Unknown, Entered By History   Multiple Vitamins-Minerals (PRESERVISION AREDS PO) Take 1 capsule by mouth daily   Yes Unknown, Entered By History   omeprazole 20 MG tablet Take 20 mg by mouth daily   Yes Unknown, Entered By History   UNABLE TO FIND Take 2 chew tab by mouth daily [employer] Heart Chews (Grape Seed Extract 150mg + Beet Root Powder 500mg)   Yes Unknown, Entered By History   amLODIPine (NORVASC) 5 MG tablet TAKE 1 TABLET EVERY DAY 7/31/20   Juan Simms MD   atenolol (TENORMIN) 50 MG tablet Take 50 mg by mouth daily    Unknown, Entered By History   order for DME Equipment being ordered: Compression knee high stockings for B LE lymphedema 20-30mmHg  Patient not taking: Reported on 2/20/2019 1/27/17   Juan Simms MD   order for DME Equipment being ordered: Class 2 (30-40mmHg) compression knee high stockings, night time knee high compression alternative, bandaging supplies  Patient not taking: Reported on 2/20/2019 6/20/16   Juan Simms MD   tacrolimus (GENERIC EQUIVALENT) 1 MG capsule TAKE 1 CAPSULE EVERY MORNING  AND TAKE 2 CAPSULES EVERY EVENING  DOSED  12  HOURS APART 6/3/20   Juan Simms MD       Scheduled Meds    albuterol  6 puff Inhalation Q4H     artificial tears   Both Eyes Q8H     chlorhexidine  15 mL Mouth/Throat Q12H     methylPREDNISolone  62.5 mg Intravenous Q6H     pantoprazole  40 mg Per Feeding Tube QAM AC    Or     pantoprazole  40 mg Intravenous QAM AC     piperacillin-tazobactam  2.25 g Intravenous Q6H     vancomycin place richards - receiving intermittent dosing  1 each Intravenous See Admin Instructions       Infusion Meds    - MEDICATION INSTRUCTIONS -       - MEDICATION INSTRUCTIONS -       norepinephrine 0.08 mcg/kg/min (11/13/22 0721)     propofol 50 mcg/kg/min (11/13/22 0722)     sodium chloride 20 mL/hr at 11/13/22 0722     vasopressin 2.4 Units/hr  (11/13/22 0722)       PRN Meds  glucose **OR** dextrose **OR** glucagon, fentaNYL, fentaNYL, - MEDICATION INSTRUCTIONS -, - MEDICATION INSTRUCTIONS -, metoprolol, naloxone **OR** naloxone **OR** naloxone **OR** naloxone, ondansetron **OR** ondansetron, prochlorperazine **OR** prochlorperazine **OR** prochlorperazine, senna-docusate **OR** senna-docusate, vecuronium, vecuronium    Allergies   No Known Allergies  Family History   No family history on file.  Social History   Social History     Tobacco Use     Smoking status: Never     Smokeless tobacco: Never   Substance Use Topics     Alcohol use: Not Currently     Alcohol/week: 0.0 standard drinks     Drug use: No       Review of Systems   The 10 point Review of Systems is negative other than noted in the HPI or here.        PHYSICAL EXAMINATION   Temp:  [90.9  F (32.7  C)-93.9  F (34.4  C)] 91.4  F (33  C)  Pulse:  [45-89] 45  Resp:  [0-33] 20  BP: ()/(33-77) 109/68  MAP:  [60 mmHg-108 mmHg] 76 mmHg  Arterial Line BP: ()/(47-80) 108/55  FiO2 (%):  [80 %-100 %] 80 %  SpO2:  [80 %-100 %] 97 %    General Exam  General:  patient lying in bed without any acute distress    HEENT:  normocephalic/atraumatic  Pulmonary:  on mechanical ventilation    Neuro Exam  Mental Status: unresponsive on sedation  Cranial Nerves: gaze conjugate, pupils symmetrically 3mm, equally sluggish to light bilaterally, face appears grossly symmetric, weak cough with sedation  Motor: deferred  Reflexes: toes upgoing on right, neutral on left   Sensory: deferred  Coordination: deferred  Station/Gait: deferred    Imaging  I personally reviewed all imaging; relevant findings per HPI.    Labs Data   CBC  Recent Labs   Lab 11/12/22  2256 11/12/22  1502 11/12/22  1147   WBC 18.7* 18.7* 17.2*   RBC 3.43* 3.40* 3.54*   HGB 11.4* 11.3* 11.6*   HCT 36.1* 35.6* 40.0    215 237     Basic Metabolic Panel   Recent Labs   Lab 11/13/22  0827 11/13/22  0517 11/12/22  2256 11/12/22  1554  11/12/22  1502 11/12/22  1209 11/12/22  1147   NA  --   --   --   --  137  --  138   POTASSIUM  --   --   --   --  3.7  --  3.6   CHLORIDE  --   --   --   --  102  --  100   CO2  --   --   --   --  21  --  21   BUN  --   --   --   --  82*  --  81*   CR  --   --   --   --  4.68*  --  5.06*   * 113* 105*   < > 100*   < > 50*   KELLY  --   --   --   --  8.4*  --  9.2    < > = values in this interval not displayed.     Liver Panel  Recent Labs   Lab 11/12/22  1502 11/12/22  1147   PROTTOTAL 7.0 7.4   ALBUMIN 2.3* 2.4*   BILITOTAL 1.9* 1.5*   ALKPHOS 187* 223*   AST 86* 61*   ALT 34 27     INR    Recent Labs   Lab Test 11/13/22  0516 11/12/22  2256 11/12/22  1502   INR 1.84* 1.80* 1.74*      Lipid Profile    Recent Labs   Lab Test 04/20/20  1157 08/01/19  1500 01/08/19  0840 10/31/17  1037 03/01/16  0648   CHOL 161  --   --  134 199   HDL 42  --   --  57 72   LDL 81  --   --  58 111*   TRIG 192* 177* 135 92 82     A1C    Recent Labs   Lab Test 09/22/16  0741   A1C 4.8     Troponin    Recent Labs   Lab 11/12/22  1147   TROPONINIS 10          Stroke Consult Data Data   This was a non-emergent, non-telestroke consult.  I personally examined and evaluated the patient today. At the time of my evaluation and management the patient was in critical condition today due to cardiac arrest. I personally managed neurological assessment. Key decisions made today included neurological monitoring interval. I spent a total of 40 minutes providing critical care services, evaluating the patient, directing care and reviewing laboratory values and radiologic reports.

## 2022-11-13 NOTE — PROVIDER NOTIFICATION
MD Notification    Notified Person: MD    Notified Person Name: Dr. Shelton    Notification Date/Time: 0230 11.13.22    Notification Interaction: Call    Purpose of Notification: 11.12.22 12am culture: gram + cocci in pairs and chains: identified streptococcus pneumoniae    Orders Received: No new orders at this time.     Comments:

## 2022-11-13 NOTE — PROGRESS NOTES
Critical Care Progress Note  [facility]  Blanco Osborne MRN# [id number]   Age: 57 year old YOB: 1965     Date of Admission: 11/12/2022  Date of Service: 11/13/2022    Main Plans for Today   - Blood cultures +, repeat cultures  - continuing cooling till this afternoon... then rewarming  - CRRT  - HD cath placed    Summary  Assessment & Plan      Blanco Osborne is a 57 year old White male with a past medical history significant for cirrhosis, NAFLD s/p transplant  (9/2016) who presents with cardiac arrest and respiratory failure    Neurological/Psychological    **Sedation: propofol  **Pain/Agitation: fentanyl    Post-Arrest Anoxic Brain Injury  TME  Cardiac arrest 11/12/2022. Bystander CPR by fire department started immediately, ~ 20min CPR all PEA then ROSC; however, patient was intubated in stomach during transport (unclear how much time).  * CT Head (11/12/2022): GW diff satisfactory, no edema, no acute process  * Poor Neuro exam on admit: hypothermia protocol started   - NCC consulted   - TTM 33C   - MRI per NCC   - cooling protocol    Cardiovascular    Out of Hospital Cardiac Arrest  PEA arrest  Cardiogenic Shock  Witnessed with 20min CPR, PEA. 2x epi and ROSC achieved. Not responsive on admission. TTM initiated, goal 33. Patient felt unwell with cold symptoms prior to arrest, likely respiratory arrest.   * concerned patient intravascularly depleted but retaining fluid in lungs/abdomen 2/2 low oncotic pressure. Pending Echo, may give gentle albumin bolus if UOP still poor   - Echo   - 2L bolus in ER   - frequent fluid assessments   - NE + Vaso on admission   - trend UOP closely   - potential need for albumin bolus   - MP 60mg QID for ARDS / Sepsis indication    Pulmonary    Acute Hypoxic Respiratory Failure:   Pneumonia  B/L pleural effusions  ARDS  Patient feeling unwell prior to arrest (cold symptoms), productive cough. Effusions likely 2/2 arrest and time with esophageal intubation.    * COVID/Flu: negative  * CT Chest (11/12): Large R, mod L pleural effusions   - may need bronch/thora if patient not rapidly improving   - prone if PF still < 150 in the AM   - MP 60mg QID for ARDS / Sepsis indication    Renal/Fluids/Electrolytes    LORETTA:   * Cr on admission 5.  * Still making urine on admission, follow closely, electrolytes appropriate for now  * CRRT starting 11/13/2022   - trend Cr   - nephrology consult   - CRRT starting 11/13/2022    Lactic Acidosis: >10 on admission,    - continue to trend   - most recent 6    Plan  - monitor function and electrolytes as needed with replacement per ICU protocols. - generally avoid nephrotoxic agents such as NSAID, IV contrast unless specifically required  - adjust medications as needed for renal clearance  - follow I/O's as appropriate.    **I/O last 24hrs: No intake or output data in the 24 hours ending 11/12/22 1505    Infectious Diseases    Sepsis / Pneumonia  Strep Bacteremia  Cold symptoms with productive cough prior to admission. Flu/Covid negative.   * Blood Cx (11/12): + 2/2 bottles Strep Pneumonia   - Vanc/Zosyn   - RVP pending   - procal 25     Gastrointestinal/Genitourinary    NAFLD  S/p Liver Transplant 9/2016  On tacrolimus for immunosuppression. Unclear when last dose was.   - held on admission given LORETTA   - tacro level now   - restart when able    **Nutrition: NPO now, TF likely start in AM    Endocrine/Metabolic    Stress induced hyperglycemia.  Plan  - ICU insulin protocol, goal sugar <180    Hypoglycemia on admission:   - Replaced D50   - monitor closely      Hematology/Oncology    Leukocytosis: 2/2 sepsis, unclear source on admission and cardiac arrest   - trend    Clinically Significant Risk Factors Present on Admission           # Hypercalcemia: corrected calcium is >10.1, will monitor as appropriate    # Hypoalbuminemia: Lowest albumin = 2.3 g/dL (Ref range: 3.5-5.2) at 11/12/2022  3:02 PM, will monitor as appropriate   # Coagulation  Defect: INR = 1.84 (Ref range: 0.85 - 1.15) and/or PTT = 41 Seconds (Ref range: 22 - 38 Seconds), will monitor for bleeding   # Acute Respiratory Failure: Documented O2 saturation < 91%.  Continue supplemental oxygen as needed  # Circulatory Shock: currently requiring pressors for blood pressure support   # Obesity: Estimated body mass index is 31.78 kg/m  as calculated from the following:    Height as of this encounter: 1.829 m (6').    Weight as of this encounter: 106.3 kg (234 lb 5.6 oz).        ------------------------------------------------------------------------------------------------------------------  Prophylaxis/ICU Checklist    -DVT: Subcutaneous Heparin    -VAP: HOB 30 degrees, chlorhexidine rinse    -Stress Ulcer: PPI    -Restraints: Nonviolent soft two point restraints required and necessary for patient safety and continued cares and good effect as patient continues to pull at necessary lines, tubes despite education and distraction. Will readdress daily.     -IV Access: Central access required and necessary for continued patient cares     -Feeding - Tube feeds    Code status: Full  Disposition: ICU    I have personally reviewed the daily labs, imaging studies, cultures and discussed the case with referring physician and consulting physicians.     This patient is critically ill and I have provided 60 minutes of critical care time (excluding procedures) on 11/13/2022    =========================================================  Kian Hassan MD  Pulmonary & Critical Care Instructor  Department of Medicine  Division of Pulmonary, Allergy, Critical Care and Sleep Medicine   [facility], [facility]  [phone] (Text Page)   Interval History   Cooled overnight. Plan to rewarm in AM  Getting nephrology involved for LORETTA  Planning for CRRT today  EEG planned as well  Medications   Current Facility-Administered Medications   Medication Dose Route Frequency     albuterol  6 puff Inhalation Q4H      artificial tears   Both Eyes Q8H     chlorhexidine  15 mL Mouth/Throat Q12H     methylPREDNISolone  62.5 mg Intravenous Q6H     pantoprazole  40 mg Per Feeding Tube QAM AC    Or     pantoprazole  40 mg Intravenous QAM AC     piperacillin-tazobactam  2.25 g Intravenous Q6H     vancomycin place richards - receiving intermittent dosing  1 each Intravenous See Admin Instructions     Current Facility-Administered Medications   Medication Last Rate     - MEDICATION INSTRUCTIONS -       - MEDICATION INSTRUCTIONS -       norepinephrine 0.08 mcg/kg/min (11/13/22 0721)     propofol 50 mcg/kg/min (11/13/22 0722)     sodium chloride 20 mL/hr at 11/13/22 0722     vasopressin 2.4 Units/hr (11/13/22 0722)     Physical Exam   Vital Signs with Ranges  Temp:  [90.9  F (32.7  C)-93.9  F (34.4  C)] 91.4  F (33  C)  Pulse:  [45-89] 45  Resp:  [0-33] 20  BP: ()/(33-77) 109/68  MAP:  [60 mmHg-108 mmHg] 76 mmHg  Arterial Line BP: ()/(47-80) 108/55  FiO2 (%):  [80 %-100 %] 80 %  SpO2:  [80 %-100 %] 97 %    Wt Readings from Last 1 Encounters:   11/13/22 106.3 kg (234 lb 5.6 oz)    Body mass index is 31.78 kg/m .     Arterial Line BP: ()/(47-80) 108/55  MAP:  [60 mmHg-108 mmHg] 76 mmHg  BP - Mean:  [40-97] 79  Vent Mode: CMV/AC  (Continuous Mandatory Ventilation/ Assist Control)  FiO2 (%): 80 %  Resp Rate (Set): 20 breaths/min  Tidal Volume (Set, mL): 400 mL  PEEP (cm H2O): 14 cmH2O  Resp: 20  General: no acute distress , intubated and sedated  HEENT: NCAT, trachea midline, sclera anicteric  Neck/Thyroid: supple  Lungs: unlabored synchronous with vent, rhonchi anteriorly    Cardiovascular:  RRR S1S2 no gallop,  No rub, no murmur  Abdomen: protuberant nontender, hypoactive bowel sounds, no mass  /Rectal: deferred  MSK: normal muscle bulk and tone  Skin: no obvious rashes  Extremities: edema, warm  Neurological: withdrawals to pain    I/O last 3 completed shifts:  In: 2883.09 [I.V.:2883.09]  Out: 610 [Urine:60; Emesis/NG  output:550]  Data   Labs & Studies of Note: I personally reviewed the following studies:    Imaging: All new imaging reviewed by me  Recent Results (from the past 24 hour(s))   XR Chest Port 1 View    Narrative    EXAM: XR CHEST PORT 1 VIEW  LOCATION: [facility]  DATE/TIME: 11/12/2022 11:22 AM    INDICATION: cardiac arrest, post intubation  COMPARISON: 09/01/2020      Impression    IMPRESSION: Endotracheal tube is about 7.5 cm above the og and could be advanced by 1 to 2 cm. NG tube coursing below the left hemidiaphragm. Pacer pads over the left chest. Moderate bilateral pleural effusions and bibasilar atelectasis. No   pneumothorax. The cardiac and mediastinal silhouettes are within normal limits.   Head CT w/o contrast    Narrative    EXAM: CT HEAD W/O CONTRAST  LOCATION: [facility]  DATE/TIME: 11/12/2022 2:27 PM    INDICATION: Cardiac arrest; altered mental state  COMPARISON: None.  TECHNIQUE: Routine CT Head without IV contrast. Multiplanar reformats. Dose reduction techniques were used.    FINDINGS:  INTRACRANIAL CONTENTS: No intracranial hemorrhage, extraaxial collection, or mass effect.  No CT evidence of acute infarct. Mild presumed chronic small vessel ischemic changes. Mild generalized volume loss. No hydrocephalus. Corpus callosum is normal.   Position of the cerebral tonsils is satisfactory. Sella shows no acute abnormality. Vascular calcification.     VISUALIZED ORBITS/SINUSES/MASTOIDS: No intraorbital abnormality. Mild to moderate mucosal thickening scattered about the paranasal sinuses. Patient is intubated nasally and orally. Trace fluid mastoid air cells bilaterally.    BONES/SOFT TISSUES: Overall mineralization is satisfactory. No fracture of the calvarium or skull base is noted. No swelling of the facial or scalp soft tissues.      Impression    IMPRESSION:  1.  No acute process intracranially.    2.  Patient is intubated nasally and  orally.    3.  No intracranial mass, mass effect, or hemorrhage.    4.  Gray-white differentiation is satisfactory with no convincing evidence for cerebral edema.    5.  Partial opacification paranasal sinuses. This may be on an inflammatory basis.   CT Chest Abdomen Pelvis w/o Contrast    Narrative    EXAM: CT CHEST ABDOMEN PELVIS W/O CONTRAST  LOCATION: [facility]  DATE/TIME: 11/12/2022 2:27 PM    INDICATION: Cardiac arrest  COMPARISON: PET CT on 09/17/2020 and chest x-ray earlier today  TECHNIQUE: CT scan of the chest, abdomen, and pelvis was performed without IV contrast. Multiplanar reformats were obtained. Dose reduction techniques were used.   CONTRAST: None.    FINDINGS:   LUNGS AND PLEURA: Large right and moderate left pleural effusion and dense consolidation/atelectasis in the lung bases. The endotracheal tube is in the midtrachea. The central tracheobronchial tree is patent.    MEDIASTINUM/AXILLAE: Several small bilateral supraclavicular lymph nodes, indeterminate. No lymphadenopathy in the axillae or mediastinum. No thoracic aortic aneurysm. Mild generalized cardiomegaly. Small pericardial effusion.    CORONARY ARTERY CALCIFICATION: Moderate.    HEPATOBILIARY: Postoperative changes of disease donor liver transplant. Cholecystectomy. No definite focal masses in the hepatic allograft on noncontrast CT.    PANCREAS: No duct dilatation or peripancreatic changes. No definite masses on noncontrast CT.    SPLEEN: Stable mild splenomegaly.    ADRENAL GLANDS: Mild diffuse thickening of the adrenal glands.    KIDNEYS/BLADDER: Small nonobstructing bilateral renal calculi measuring 3-4 mm. No hydronephrosis or perinephric stranding bilaterally. No renal masses evident on noncontrast CT. Nixon catheter decompresses the urinary bladder.    BOWEL: NG tube is in the stomach. No small bowel or colonic obstruction or inflammatory changes.    LYMPH NODES: No lymphadenopathy.    VASCULATURE: No  abdominal aortic aneurysm.    PELVIC ORGANS: No pelvic masses.    OTHER: Moderate ascites. Mild anasarca. No free air or fluid collections. Right common femoral central venous catheter with tip in the mid IVC, at the level of the renal vein confluence.    MUSCULOSKELETAL: Old healed rib fracture deformities. No suspicious lesions in the bones.      Impression    IMPRESSION:  1.  Large right and moderate left pleural effusion and bibasilar dense atelectasis/consolidation.  2.  Moderate ascites.  3.  Stable appearance of the hepatic allograft on noncontrast CT.  4.  Stable mild splenomegaly.  5.  Mild generalized cardiac enlargement and small pericardial effusion.  6.  Numerous small supraclavicular lymph nodes are nonspecific and likely reactive.   XR Chest Port 1 View    Narrative    EXAM: XR CHEST PORTABLE 1 VIEW  LOCATION: [facility]  DATE/TIME: 11/12/2022, 3:15 PM    INDICATION: Endotracheal tube positioning.  COMPARISON: Chest x-rays from 11:23 AM on 11/12/2022.    FINDINGS: Lungs are hypoaerated. Bibasilar opacities are again noted and likely represent pleural fluid collections and atelectasis and/or effusions. Cardiac silhouette appears enlarged, even given technique. No obvious pneumothorax is identified.    There is limited evaluation of the tubes and lines in the mediastinum due to the multiple cardiac leads projected over the chest and due to the multiplicity of tubes projected over the mediastinum. Enteric sump is projected over the mediastinum extending   off the inferior aspect of the image. Probable thermistor probe in the esophagus is noted. Distal aspect may be at the gastroesophageal junction or in the upper aspect of the stomach. There is an endotracheal tube which appears to have been slightly   advanced since the prior study and now is approximately 5 cm above the og as opposed to 7.5 cm above the og on the prior study. Again, the tip of this tube is difficult to  evaluate due to the other tubes and lines projected over this region.      Impression    IMPRESSION:   1.  Endotracheal tube tip appears to be approximately 5.1 cm above the og. It is somewhat difficult to evaluate due to the overlying cardiac leads, enteric sump, and enteric thermistor probe.  2.  Bibasilar opacities likely represent atelectasis, infiltrates and/or effusions.  3.  Cardiomegaly is suggested.  4.  Interval removal of external pacer patch projected over the left lower chest.  5.  No other changes.       ROUTINE ICU LABS (Last four results)  ROUTINE ICU LABS (Last four results)  CMP  Recent Labs   Lab 11/12/22 2011 11/12/22  1559 11/12/22  1502 11/12/22  1209 11/12/22  1147   NA  --   --  137  --  138   POTASSIUM  --   --  3.7  --  3.6   CHLORIDE  --   --  102  --  100   CO2  --   --  21  --  21   ANIONGAP  --   --  14  --  17*   GLC 93 87 100* 173* 50*   BUN  --   --  82*  --  81*   CR  --   --  4.68*  --  5.06*   GFRESTIMATED  --   --  14*  --  12*   KELLY  --   --  8.4*  --  9.2   PROTTOTAL  --   --  7.0  --  7.4   ALBUMIN  --   --  2.3*  --  2.4*   BILITOTAL  --   --  1.9*  --  1.5*   ALKPHOS  --   --  187*  --  223*   AST  --   --  86*  --  61*   ALT  --   --  34  --  27     CBC  Recent Labs   Lab 11/12/22 2256 11/12/22  1502 11/12/22  1147   WBC 18.7* 18.7* 17.2*   RBC 3.43* 3.40* 3.54*   HGB 11.4* 11.3* 11.6*   HCT 36.1* 35.6* 40.0   * 105* 113*   MCH 33.2* 33.2* 32.8   MCHC 31.6 31.7 29.0*   RDW 14.8 14.8 14.8    215 237     INR  Recent Labs   Lab 11/13/22  0516 11/12/22 2256 11/12/22  1502 11/12/22  1144   INR 1.84* 1.80* 1.74* 1.72*     Arterial Blood Gas  Recent Labs   Lab 11/12/22 2011 11/12/22  1656 11/12/22  1502 11/12/22  1233   PH 7.21* 7.13* 7.14* 7.11*   PCO2 52* 62* 58* 63*   PO2 72* 107* 83 69*   HCO3 21 20* 20* 20*   O2PER 80 100 100 100       Inflammatory Markers  No lab results found.  Immune Globulin Studies  No lab results found.    Microbiology:  Culture  Micro   Date Value Ref Range Status   09/28/2016 No VRE isolated  Final   09/23/2016 No growth  Final   09/20/2016   Final    Culture negative for acid fast bacilli  Assayed at [facility] [address]     09/20/2016 No anaerobes isolated  Final   09/20/2016 No growth  Final   09/20/2016 Culture negative after 4 weeks  Final     Recent Labs   Lab Test 09/28/16  1600 09/23/16  1458 09/21/16  0034 09/20/16  1901   CULT No VRE isolated No growth  --  Culture negative for acid fast bacilli  Assayed at [facility] [address]    Culture negative after 4 weeks  No anaerobes isolated  No growth   SDES Rectal Catheterized Urine Nares Ascites  Ascites  Ascites  Ascites  Ascites  Ascites       Urine Studies:    Recent Labs   Lab Test 10/31/17  1038 09/24/16  1055 09/23/16  1458   LEUKEST Negative Moderate* Large*   NITRITE Negative Negative Negative   WBCU 1 13* 103*   RBCU <1 38* >182*

## 2022-11-13 NOTE — PROGRESS NOTES
Preliminary EEG report:    First 30 minutes of video EEG was reviewed.  There is a burst suppression pattern with bursts of generalized theta delta activity with occasional superimposed fast beta lasting 3 to 10 seconds with interburst intervals of generalized attenuation lasting 1 to 4 seconds.  No epileptiform discharges or electrographic seizures were recorded.  Findings are consistent with severe diffuse nonspecific encephalopathy.  Sedative drips could in part contribute to this finding.    Reba Laughlin MD

## 2022-11-13 NOTE — PROVIDER NOTIFICATION
MD Notification    Notified Person: MD    Notified Person Name: Dr. Beaulieu     Notification Date/Time: 2015 11.12.22    Notification Interaction: Face to face    Purpose of Notification: BG 93. Goal per order 120-150.    Orders Received: No new orders at this time. Continue to Monitor.     Comments:

## 2022-11-13 NOTE — CONSULTS
[facility]    RENAL CONSULTATION NOTE    REFERRING MD:  Dr. Hassan    REASON FOR CONSULTATION:  Oliguric LORETTA, liver tx pt, PEA arrest    DATE OF CONSULTATION: 11/13/22    SHORTHAND KEY FOR MY NOTES:  c = with, s = without, p = after, a = before, x = except, asx = asymptomatic, tx = transplant or treatment, sx = symptoms or symptomatic, cx = canceled or culture, rxn = reaction, yday = yesterday, nl = normal, abx = antibiotics, fxn = function, dx = diagnosis, dz = disease, m/h = melena/hematochezia, c/d/l/ha = cramping/dizziness/lightheadedness/headache, d/c = discharge or diarrhea/constipation, f/c/n/v = fevers/chills/nausea/vomiting, cp/sob = chest pain/shortness of breath, tbv = total body volume, rxn = reaction, tdc = tunneled dialysis catheter, pta = prior to admission, hd = hemodialysis, pd = peritoneal dialysis, hhd = home hemodialysis, edw = estimated dry wt    HPI: Blanco Osborne is a 57 year old male c ESLD 2 EtOH s/p liver tx (2016) who was admitted on 11/12/2022 c cardiac arrest and now has oliguric LORETTA.  Hx obtained from the chart and from brother.    Pt was feeling unwell and was hypoxic to the 80s on a home sat monitor for the past wk.  He was weak, tired, febrile and has urinary urgency during that time. Yday, he was confused and had slurred speech, so his wife and brother activated EMS.  When EMS got there, he eventually arrested while walking out of the apt.  CPR was started right away and he was found to be in PEA.  He rec'd epi and bicarb and his pulse returned.  He was noted to be hypoxic in the 50s.    In the ER, he was hypotensive, hypothermic and hypoxic.  His WBC was 17, lactate 10, cr 5, pH 6.96 and multiple other labs were abn.  He was intubated, had lines placed and was started on pressors, abx and given fluids.  He was cooled and sent to the ICU.    In the ICU, he remains quite ill.  He is oliguric and labs remain abn.  D/w pt's wife (via phone) and  brother/niece (at bedside) re his critical condition and need for CRRT.  Their questions were all answered and his wife gave consent for line placement and initiation of dialysis.      ROS:  Unable.    PMH:    Past Medical History:   Diagnosis Date     Ascites      Cirrhosis of liver with ascites (H) 2016     H/O alcohol abuse      HTN (hypertension)      NAFLD (nonalcoholic fatty liver disease) 2016     CHRISTIAN on CPAP      SBP (spontaneous bacterial peritonitis) (H)     MNGI     Tubular adenoma 10/2019    Large cecal adenoma- due for surveillance colonoscopy in 3 years (10/2022)     PSH:    Past Surgical History:   Procedure Laterality Date     APPENDECTOMY       BENCH LIVER N/A 2016    Procedure: BENCH LIVER;  Surgeon: Enoc Crews MD;  Location: [facility] OR     COLONOSCOPY  13    repeat in      COLONOSCOPY N/A 10/4/2019    Procedure: COLONOSCOPY, WITH POLYPECTOMY AND BIOPSY;  Surgeon: Go Chong MD;  Location: [facility] OR     HERNIA REPAIR       TRANSPLANT LIVER RECIPIENT  DONOR N/A 2016    Procedure: TRANSPLANT LIVER RECIPIENT  DONOR;  Surgeon: Enoc Crews MD;  Location: [facility] OR     MEDICATIONS:      albuterol  6 puff Inhalation Q4H     artificial tears   Both Eyes Q8H     chlorhexidine  15 mL Mouth/Throat Q12H     sodium chloride (PF) 0.9%  10 mL Intracatheter Once in dialysis/CRRT    Followed by     heparin  1.3-2.6 mL Intracatheter Once in dialysis/CRRT     sodium chloride (PF) 0.9%  10 mL Intracatheter Once in dialysis/CRRT    Followed by     heparin  1.3-2.6 mL Intracatheter Once in dialysis/CRRT     methylPREDNISolone  62.5 mg Intravenous Q6H     pantoprazole  40 mg Per Feeding Tube QAM AC    Or     pantoprazole  40 mg Intravenous QAM AC     piperacillin-tazobactam  2.25 g Intravenous Q6H     vancomycin  500 mg Intravenous Once     vancomycin place richards - receiving intermittent dosing  1 each Intravenous See Admin Instructions     ALLERGIES:     Allergies as of 11/12/2022     (No Known Allergies)     FH:    No family history on file.   R/NC    SH:    Social History     Socioeconomic History     Marital status:      Spouse name: Not on file     Number of children: Not on file     Years of education: Not on file     Highest education level: Not on file   Occupational History     Not on file   Tobacco Use     Smoking status: Never     Smokeless tobacco: Never   Substance and Sexual Activity     Alcohol use: Not Currently     Alcohol/week: 0.0 standard drinks     Drug use: No     Sexual activity: Not on file   Other Topics Concern     Parent/sibling w/ CABG, MI or angioplasty before 65F 55M? Not Asked   Social History Narrative     Not on file     Social Determinants of Health     Financial Resource Strain: Not on file   Food Insecurity: Not on file   Transportation Needs: Not on file   Physical Activity: Not on file   Stress: Not on file   Social Connections: Not on file   Intimate Partner Violence: Not on file   Housing Stability: Not on file     PHYSICAL EXAM:    /68   Pulse (!) 46   Temp (!) 91.6  F (33.1  C)   Resp (!) 40   Ht 1.829 m (6')   Wt 106.3 kg (234 lb 5.6 oz)   SpO2 96%   BMI 31.78 kg/m      GENERAL: intubated, sedated  HEENT:  normocephalic, no gross abnormalities; pupils equal, EOMI; no scleral icterus or conj edema; + NG  CV: reg, keon, nl S1/S2; tr ble edema  RESP: + coarse vent sounds  GI: abdomen o/s/nt/nd; + liver tx  :  + gomez, nl genitalia  MUSCULOSKELETAL: extremities nl - no gross deformities noted  SKIN: no suspicious lesions or rashes, dry to touch  NEURO:  sedated   PSYCH: unable  LYMPH: no palpable ant/post cervical and supraclavicular adenopathy  LINES:  + gomez, + R fem 3-lumen, art lines    LABS:      CBC RESULTS:     Recent Labs   Lab 11/13/22  0828 11/12/22  2256 11/12/22  1502 11/12/22  1147   WBC 16.3* 18.7* 18.7* 17.2*   RBC 3.44* 3.43* 3.40* 3.54*   HGB 11.3* 11.4* 11.3* 11.6*   HCT 36.0* 36.1*  35.6* 40.0   PLT  --  193 215 237     BMP RESULTS:  Recent Labs   Lab 11/13/22  0828 11/13/22  0827 11/13/22  0517 11/12/22  2256 11/12/22 2011 11/12/22  1559 11/12/22  1502 11/12/22  1209 11/12/22  1147     --   --   --   --   --  137  --  138   POTASSIUM 3.7  --   --   --   --   --  3.7  --  3.6   CHLORIDE 103  --   --   --   --   --  102  --  100   CO2 21  --   --   --   --   --  21  --  21   BUN 83*  --   --   --   --   --  82*  --  81*   CR 4.74*  --   --   --   --   --  4.68*  --  5.06*   * 113* 113* 105* 93 87 100*   < > 50*   KELLY 8.6  --   --   --   --   --  8.4*  --  9.2    < > = values in this interval not displayed.     INR  Recent Labs   Lab 11/13/22  0516 11/12/22  2256 11/12/22  1502 11/12/22  1144   INR 1.84* 1.80* 1.74* 1.72*      DIAGNOSTICS:  Personally reviewed CXR - large effusions; chest/abd CT - large effusions, kidneys look ok x stones; head CT - nothing acute    A/P:  Blanco Osborne is a 57 year old male c h/o liver tx who now has oliguric LORETTA 2 ATN s/p PEA arrest, resp failure, and sepsis syndrome.    1.  Oliguric LORETTA 2 ATN.  Pt's baseline cr is < 1 (based on labs in 2020) and he is now in need of HD.  He is on 75% FIO2 and is getting 2+L in s much output, so is at high risk for worsening resp status if we don't manage his fluid.  He is on 2 pressors and will not likely tolerate IHD well.  Chemistries are ok right now.  A.  Start CRRT c net zero UF.  B.  Follow labs, uo daily.  C.  Consents obtained for line placement and dialysis.    2.  Sepsis syndrome.  Pt's lactate is back to nl today p peaking ~10.  He is on broad abx.  Currently, he is on norepi + vaso and BP is fine, but as he warms up it may drop.  A.  Continue abx at proper dose for CRRT.  B.  Continue pressors and adjust prn.    3.  ESLD s/p liver tx.  Pt is on tac only for immunosuppression.  He hasn't f/u c the liver tx team in a long time it seems.  A.  Hold tac.  B.  Follow labs.    4.  Resp failure.  Pt is on  75% FIO2.  With the net positive fluid balance he is at risk for worsening.  A.  Vent mgmt per ICU.    5.  Bradycardia.  The pt is on atenolol as an outpt and he may have been taking this while he was getting worse.  As such, he prob hasn't cleared it well and we will need to give him some time for that to happen.  Cooling him prob is contributing, too.  A.  Monitor HR as he starts CRRT and is rewarmed.  B.  Hold atenolol.    6.  FEN.  Electrolytes are ok right now.  A.  Follow electrolytes.    Thank you for this consultation. We will follow c you.  Please call if any questions.     Attestation:   I have reviewed today's relevant vital signs, notes, medications, labs and imaging.    Thierry Reddy MD  [facility] - Nephrology  [phone]

## 2022-11-13 NOTE — PROGRESS NOTES
[facility] ICU RESPIRATORY NOTE        Date of Admission: 11/12/2022    Date of Intubation (most recent): 11/12/2022    Reason for Mechanical Ventilation: Respiratory failure.     Number of Days on Mechanical Ventilation: 1    Met Criteria for Spontaneous Breathing Trial: No    Reason for No Spontaneous Breathing Trial: Peep of +14.    Significant Events Today: None.    ABG Results:   Recent Labs   Lab 11/12/22 2011 11/12/22  1656 11/12/22  1502 11/12/22  1233   PH 7.21* 7.13* 7.14* 7.11*   PCO2 52* 62* 58* 63*   PO2 72* 107* 83 69*   HCO3 21 20* 20* 20*   O2PER 80 100 100 100       Current Vent Settings: Vent Mode: CMV/AC  (Continuous Mandatory Ventilation/ Assist Control)  FiO2 (%): 80 %  Resp Rate (Set): 20 breaths/min  Tidal Volume (Set, mL): 400 mL  PEEP (cm H2O): 14 cmH2O  Resp: 19      Skin Assessment: Clean and intact.     Plan: Continue full ventilatory support and wean as tolerated.     Arvin High, RT on 11/13/2022 at 4:30 AM

## 2022-11-13 NOTE — PLAN OF CARE
Shift Summary:  Target temp reached at 1600 @ 11/12/22.     Neuro: Withdraws from pain. Some spontaneous movement.  Cardiac: SR  Pulmonary: Course LS.   GI: Large round, distended belly.   : Nixon cath.   Skin: Scattered bruising, edema on BLE. Small abrasion on L. Wrist.   Lines: PIV: L. Hand, L. anterior arm, R. Upper arm. CVC TL R. Femoral. Art. Line R. Radial.   Drips: Norepinephrine, Propofol, Vasopressin.  Activity: Bedrest.  Restraints: BUE soft restraints.     Plan:Continue therapeutic hypothermia protocol.

## 2022-11-13 NOTE — PHARMACY-VANCOMYCIN DOSING SERVICE
"Pharmacy Vancomycin Note  Date of Service 2022  Patient's  1965   57 year old, male    Indication: Sepsis  Day of Therapy: 2  Current vancomycin regimen:  Intermittent dosing based on levels.   Current vancomycin monitoring method: AUC  Current vancomycin therapeutic monitoring goal: 400-600 mg*h/L    InsightRX Prediction of Current Vancomycin Regimen  Predicted AUC ~24h post-loading dose = 559 mg*h/L    Current estimated CrCl = Estimated Creatinine Clearance: 21.4 mL/min (A) (based on SCr of 4.74 mg/dL (H)).    Creatinine for last 3 days  2022: 11:47 AM Creatinine 5.06 mg/dL;  3:02 PM Creatinine 4.68 mg/dL  2022:  8:28 AM Creatinine 4.74 mg/dL    Recent Vancomycin Levels (past 3 days)  2022:  5:16 AM Vancomycin 23.0 mg/L    Vancomycin IV Administrations (past 72 hours)                   vancomycin (VANCOCIN) 2,500 mg in 0.9% NaCl 500 mL intermittent infusion (mg) 2,500 mg New Bag 22 1252                Nephrotoxins and other renal medications (From now, onward)    Start     Dose/Rate Route Frequency Ordered Stop    22 1300  vancomycin (VANCOCIN) 500 mg vial to attach to  mL bag         500 mg  over 1 Hours Intravenous ONCE 22 0949      22 1730  piperacillin-tazobactam (ZOSYN) 2.25 g vial to attach to  ml bag        Note to Pharmacy: For [facility], [facility] and [facility]: For Zosyn-naive patients, use the \"Zosyn initial dose + extended infusion\" order panel.    2.25 g  over 30 Minutes Intravenous EVERY 6 HOURS 22 1444      22 1530  vasopressin 0.2 units/mL in NS (PITRESSIN) standard conc infusion         2.4 Units/hr  12 mL/hr  Intravenous CONTINUOUS 22 1526      22 1457  vancomycin place richards - receiving intermittent dosing         1 each Intravenous SEE ADMIN INSTRUCTIONS 22 1457      22 1155  norepinephrine (LEVOPHED) 4 mg in  mL infusion PREMIX         0.01-0.6 mcg/kg/min × 101 kg  3.8-227.3 mL/hr  Intravenous " CONTINUOUS 11/12/22 1153               Contrast Orders - past 72 hours (72h ago, onward)    None          Interpretation of levels and current regimen:  Vancomycin level is reflective of -600    Has serum creatinine changed greater than 50% in last 72 hours: No    Urine output:  diminished urine output    Renal Function: Worsening    InsightRX Prediction of Planned New Vancomycin Regimen  Projected AUC 24h post-dose ~ 495 mg*h/L    Plan:  1. Give Vancomycin 500 mg IV x 1 dose (24h after last dose).    2. Vancomycin monitoring method: AUC  3. Vancomycin therapeutic monitoring goal: 400-600 mg*h/L  4. Pharmacy will check vancomycin levels as appropriate with AM labs. .  5. Serum creatinine levels will be ordered daily for the first week of therapy and at least twice weekly for subsequent weeks.    Shayla Cam, BitaD, BCPS

## 2022-11-14 LAB
ALBUMIN SERPL-MCNC: 2 G/DL (ref 3.4–5)
ALBUMIN SERPL-MCNC: 2.1 G/DL (ref 3.4–5)
ALBUMIN SERPL-MCNC: 2.2 G/DL (ref 3.4–5)
ALP SERPL-CCNC: 184 U/L (ref 40–150)
ANION GAP SERPL CALCULATED.3IONS-SCNC: 12 MMOL/L (ref 3–14)
ANION GAP SERPL CALCULATED.3IONS-SCNC: 14 MMOL/L (ref 3–14)
ANION GAP SERPL CALCULATED.3IONS-SCNC: 9 MMOL/L (ref 3–14)
APTT PPP: 37 SECONDS (ref 22–38)
APTT PPP: 39 SECONDS (ref 22–38)
BASE EXCESS BLDA CALC-SCNC: -7.5 MMOL/L (ref -9–1.8)
BUN SERPL-MCNC: 44 MG/DL (ref 7–30)
BUN SERPL-MCNC: 52 MG/DL (ref 7–30)
BUN SERPL-MCNC: 66 MG/DL (ref 7–30)
C PNEUM DNA SPEC QL NAA+PROBE: NOT DETECTED
CA-I BLD-MCNC: 4.5 MG/DL (ref 4.4–5.2)
CALCIUM SERPL-MCNC: 8 MG/DL (ref 8.5–10.1)
CALCIUM SERPL-MCNC: 8.3 MG/DL (ref 8.5–10.1)
CALCIUM SERPL-MCNC: 8.4 MG/DL (ref 8.5–10.1)
CHLORIDE BLD-SCNC: 102 MMOL/L (ref 94–109)
CHLORIDE BLD-SCNC: 104 MMOL/L (ref 94–109)
CHLORIDE BLD-SCNC: 104 MMOL/L (ref 94–109)
CO2 SERPL-SCNC: 20 MMOL/L (ref 20–32)
CO2 SERPL-SCNC: 20 MMOL/L (ref 20–32)
CO2 SERPL-SCNC: 22 MMOL/L (ref 20–32)
CREAT SERPL-MCNC: 2.35 MG/DL (ref 0.66–1.25)
CREAT SERPL-MCNC: 2.79 MG/DL (ref 0.66–1.25)
CREAT SERPL-MCNC: 3.5 MG/DL (ref 0.66–1.25)
ERYTHROCYTE [DISTWIDTH] IN BLOOD BY AUTOMATED COUNT: 14.9 % (ref 10–15)
ERYTHROCYTE [DISTWIDTH] IN BLOOD BY AUTOMATED COUNT: 15 % (ref 10–15)
ERYTHROCYTE [DISTWIDTH] IN BLOOD BY AUTOMATED COUNT: 15.1 % (ref 10–15)
ERYTHROCYTE [DISTWIDTH] IN BLOOD BY AUTOMATED COUNT: 15.1 % (ref 10–15)
FIBRINOGEN PPP-MCNC: 766 MG/DL (ref 170–490)
FIBRINOGEN PPP-MCNC: 827 MG/DL (ref 170–490)
FLUAV H1 2009 PAND RNA SPEC QL NAA+PROBE: NOT DETECTED
FLUAV H1 RNA SPEC QL NAA+PROBE: NOT DETECTED
FLUAV H3 RNA SPEC QL NAA+PROBE: NOT DETECTED
FLUAV RNA SPEC QL NAA+PROBE: NOT DETECTED
FLUBV RNA SPEC QL NAA+PROBE: NOT DETECTED
GFR SERPL CREATININE-BSD FRML MDRD: 19 ML/MIN/1.73M2
GFR SERPL CREATININE-BSD FRML MDRD: 25 ML/MIN/1.73M2
GFR SERPL CREATININE-BSD FRML MDRD: 31 ML/MIN/1.73M2
GLUCOSE BLD-MCNC: 113 MG/DL (ref 70–99)
GLUCOSE BLD-MCNC: 117 MG/DL (ref 70–99)
GLUCOSE BLD-MCNC: 136 MG/DL (ref 70–99)
GLUCOSE BLDC GLUCOMTR-MCNC: 105 MG/DL (ref 70–99)
GLUCOSE BLDC GLUCOMTR-MCNC: 94 MG/DL (ref 70–99)
HADV DNA SPEC QL NAA+PROBE: NOT DETECTED
HBV SURFACE AB SERPL IA-ACNC: 0 M[IU]/ML
HBV SURFACE AB SERPL IA-ACNC: NONREACTIVE M[IU]/ML
HBV SURFACE AG SERPL QL IA: NONREACTIVE
HCO3 BLD-SCNC: 21 MMOL/L (ref 21–28)
HCOV PNL SPEC NAA+PROBE: NOT DETECTED
HCT VFR BLD AUTO: 35.5 % (ref 40–53)
HCT VFR BLD AUTO: 35.8 % (ref 40–53)
HCT VFR BLD AUTO: 36.5 % (ref 40–53)
HCT VFR BLD AUTO: 37.3 % (ref 40–53)
HGB BLD-MCNC: 11.8 G/DL (ref 13.3–17.7)
HGB BLD-MCNC: 12 G/DL (ref 13.3–17.7)
HGB BLD-MCNC: 12.2 G/DL (ref 13.3–17.7)
HGB BLD-MCNC: 12.5 G/DL (ref 13.3–17.7)
HMPV RNA SPEC QL NAA+PROBE: NOT DETECTED
HPIV1 RNA SPEC QL NAA+PROBE: DETECTED
HPIV2 RNA SPEC QL NAA+PROBE: NOT DETECTED
HPIV3 RNA SPEC QL NAA+PROBE: NOT DETECTED
HPIV4 RNA SPEC QL NAA+PROBE: NOT DETECTED
INR PPP: 1.47 (ref 0.85–1.15)
INR PPP: 1.5 (ref 0.85–1.15)
M PNEUMO DNA SPEC QL NAA+PROBE: NOT DETECTED
MAGNESIUM SERPL-MCNC: 2.3 MG/DL (ref 1.6–2.3)
MAGNESIUM SERPL-MCNC: 2.4 MG/DL (ref 1.6–2.3)
MAGNESIUM SERPL-MCNC: 2.4 MG/DL (ref 1.6–2.3)
MCH RBC QN AUTO: 34.2 PG (ref 26.5–33)
MCH RBC QN AUTO: 34.5 PG (ref 26.5–33)
MCH RBC QN AUTO: 34.6 PG (ref 26.5–33)
MCH RBC QN AUTO: 35 PG (ref 26.5–33)
MCHC RBC AUTO-ENTMCNC: 32.9 G/DL (ref 31.5–36.5)
MCHC RBC AUTO-ENTMCNC: 33 G/DL (ref 31.5–36.5)
MCHC RBC AUTO-ENTMCNC: 33.5 G/DL (ref 31.5–36.5)
MCHC RBC AUTO-ENTMCNC: 34.4 G/DL (ref 31.5–36.5)
MCV RBC AUTO: 102 FL (ref 78–100)
MCV RBC AUTO: 103 FL (ref 78–100)
MCV RBC AUTO: 104 FL (ref 78–100)
MCV RBC AUTO: 105 FL (ref 78–100)
MRSA DNA SPEC QL NAA+PROBE: NEGATIVE
O2/TOTAL GAS SETTING VFR VENT: 80 %
OXYHGB MFR BLD: 92 % (ref 92–100)
PCO2 BLD: 53 MM HG (ref 35–45)
PH BLD: 7.2 [PH] (ref 7.35–7.45)
PHOSPHATE SERPL-MCNC: 3.6 MG/DL (ref 2.5–4.5)
PHOSPHATE SERPL-MCNC: 4.3 MG/DL (ref 2.5–4.5)
PHOSPHATE SERPL-MCNC: 4.6 MG/DL (ref 2.5–4.5)
PLAT MORPH BLD: ABNORMAL
PLATELET # BLD AUTO: 136 10E3/UL (ref 150–450)
PLATELET # BLD AUTO: 140 10E3/UL (ref 150–450)
PLATELET # BLD AUTO: 141 10E3/UL (ref 150–450)
PLATELET # BLD AUTO: 167 10E3/UL (ref 150–450)
PO2 BLD: 74 MM HG (ref 80–105)
POTASSIUM BLD-SCNC: 4.1 MMOL/L (ref 3.4–5.3)
POTASSIUM BLD-SCNC: 4.4 MMOL/L (ref 3.4–5.3)
POTASSIUM BLD-SCNC: 4.5 MMOL/L (ref 3.4–5.3)
RBC # BLD AUTO: 3.41 10E6/UL (ref 4.4–5.9)
RBC # BLD AUTO: 3.49 10E6/UL (ref 4.4–5.9)
RBC # BLD AUTO: 3.51 10E6/UL (ref 4.4–5.9)
RBC # BLD AUTO: 3.62 10E6/UL (ref 4.4–5.9)
RBC MORPH BLD: ABNORMAL
RSV RNA SPEC QL NAA+PROBE: NOT DETECTED
RSV RNA SPEC QL NAA+PROBE: NOT DETECTED
RV+EV RNA SPEC QL NAA+PROBE: NOT DETECTED
SA TARGET DNA: NEGATIVE
SMUDGE CELLS BLD QL SMEAR: PRESENT
SODIUM SERPL-SCNC: 135 MMOL/L (ref 133–144)
SODIUM SERPL-SCNC: 136 MMOL/L (ref 133–144)
SODIUM SERPL-SCNC: 136 MMOL/L (ref 133–144)
UFH PPP CHRO-ACNC: <0.1 IU/ML
VANCOMYCIN SERPL-MCNC: 18.1 MG/L
VANCOMYCIN SERPL-MCNC: 19.8 MG/L
WBC # BLD AUTO: 56 10E3/UL (ref 4–11)
WBC # BLD AUTO: 56.8 10E3/UL (ref 4–11)
WBC # BLD AUTO: 58.5 10E3/UL (ref 4–11)
WBC # BLD AUTO: 58.9 10E3/UL (ref 4–11)

## 2022-11-14 PROCEDURE — 36415 COLL VENOUS BLD VENIPUNCTURE: CPT | Performed by: STUDENT IN AN ORGANIZED HEALTH CARE EDUCATION/TRAINING PROGRAM

## 2022-11-14 PROCEDURE — 94003 VENT MGMT INPAT SUBQ DAY: CPT

## 2022-11-14 PROCEDURE — 87040 BLOOD CULTURE FOR BACTERIA: CPT | Performed by: STUDENT IN AN ORGANIZED HEALTH CARE EDUCATION/TRAINING PROGRAM

## 2022-11-14 PROCEDURE — 250N000011 HC RX IP 250 OP 636: Performed by: STUDENT IN AN ORGANIZED HEALTH CARE EDUCATION/TRAINING PROGRAM

## 2022-11-14 PROCEDURE — 87340 HEPATITIS B SURFACE AG IA: CPT | Performed by: INTERNAL MEDICINE

## 2022-11-14 PROCEDURE — 80069 RENAL FUNCTION PANEL: CPT | Performed by: STUDENT IN AN ORGANIZED HEALTH CARE EDUCATION/TRAINING PROGRAM

## 2022-11-14 PROCEDURE — 99291 CRITICAL CARE FIRST HOUR: CPT | Mod: 25 | Performed by: PSYCHIATRY & NEUROLOGY

## 2022-11-14 PROCEDURE — 99291 CRITICAL CARE FIRST HOUR: CPT | Performed by: STUDENT IN AN ORGANIZED HEALTH CARE EDUCATION/TRAINING PROGRAM

## 2022-11-14 PROCEDURE — 94644 CONT INHLJ TX 1ST HOUR: CPT

## 2022-11-14 PROCEDURE — 250N000011 HC RX IP 250 OP 636

## 2022-11-14 PROCEDURE — 84075 ASSAY ALKALINE PHOSPHATASE: CPT | Performed by: INTERNAL MEDICINE

## 2022-11-14 PROCEDURE — 99233 SBSQ HOSP IP/OBS HIGH 50: CPT | Performed by: STUDENT IN AN ORGANIZED HEALTH CARE EDUCATION/TRAINING PROGRAM

## 2022-11-14 PROCEDURE — 86706 HEP B SURFACE ANTIBODY: CPT | Performed by: INTERNAL MEDICINE

## 2022-11-14 PROCEDURE — 85027 COMPLETE CBC AUTOMATED: CPT | Performed by: STUDENT IN AN ORGANIZED HEALTH CARE EDUCATION/TRAINING PROGRAM

## 2022-11-14 PROCEDURE — 85384 FIBRINOGEN ACTIVITY: CPT | Performed by: ANESTHESIOLOGY

## 2022-11-14 PROCEDURE — 200N000001 HC R&B ICU

## 2022-11-14 PROCEDURE — 999N000157 HC STATISTIC RCP TIME EA 10 MIN

## 2022-11-14 PROCEDURE — 83735 ASSAY OF MAGNESIUM: CPT | Performed by: STUDENT IN AN ORGANIZED HEALTH CARE EDUCATION/TRAINING PROGRAM

## 2022-11-14 PROCEDURE — 85014 HEMATOCRIT: CPT | Performed by: STUDENT IN AN ORGANIZED HEALTH CARE EDUCATION/TRAINING PROGRAM

## 2022-11-14 PROCEDURE — 250N000009 HC RX 250: Performed by: INTERNAL MEDICINE

## 2022-11-14 PROCEDURE — 85730 THROMBOPLASTIN TIME PARTIAL: CPT | Performed by: ANESTHESIOLOGY

## 2022-11-14 PROCEDURE — 250N000013 HC RX MED GY IP 250 OP 250 PS 637: Performed by: STUDENT IN AN ORGANIZED HEALTH CARE EDUCATION/TRAINING PROGRAM

## 2022-11-14 PROCEDURE — 82330 ASSAY OF CALCIUM: CPT | Performed by: STUDENT IN AN ORGANIZED HEALTH CARE EDUCATION/TRAINING PROGRAM

## 2022-11-14 PROCEDURE — 94645 CONT INHLJ TX EACH ADDL HOUR: CPT

## 2022-11-14 PROCEDURE — 82805 BLOOD GASES W/O2 SATURATION: CPT | Performed by: SURGERY

## 2022-11-14 PROCEDURE — 85610 PROTHROMBIN TIME: CPT | Performed by: ANESTHESIOLOGY

## 2022-11-14 PROCEDURE — C9113 INJ PANTOPRAZOLE SODIUM, VIA: HCPCS | Performed by: STUDENT IN AN ORGANIZED HEALTH CARE EDUCATION/TRAINING PROGRAM

## 2022-11-14 PROCEDURE — 250N000009 HC RX 250: Performed by: STUDENT IN AN ORGANIZED HEALTH CARE EDUCATION/TRAINING PROGRAM

## 2022-11-14 PROCEDURE — 80202 ASSAY OF VANCOMYCIN: CPT | Performed by: STUDENT IN AN ORGANIZED HEALTH CARE EDUCATION/TRAINING PROGRAM

## 2022-11-14 PROCEDURE — 85520 HEPARIN ASSAY: CPT | Performed by: STUDENT IN AN ORGANIZED HEALTH CARE EDUCATION/TRAINING PROGRAM

## 2022-11-14 PROCEDURE — 258N000003 HC RX IP 258 OP 636: Performed by: STUDENT IN AN ORGANIZED HEALTH CARE EDUCATION/TRAINING PROGRAM

## 2022-11-14 PROCEDURE — 87641 MR-STAPH DNA AMP PROBE: CPT | Performed by: STUDENT IN AN ORGANIZED HEALTH CARE EDUCATION/TRAINING PROGRAM

## 2022-11-14 RX ORDER — NOREPINEPHRINE BITARTRATE 0.06 MG/ML
.01-.6 INJECTION, SOLUTION INTRAVENOUS CONTINUOUS
Status: DISCONTINUED | OUTPATIENT
Start: 2022-11-14 | End: 2022-11-20

## 2022-11-14 RX ORDER — HEPARIN SODIUM 10000 [USP'U]/100ML
500 INJECTION, SOLUTION INTRAVENOUS CONTINUOUS
Status: DISCONTINUED | OUTPATIENT
Start: 2022-11-14 | End: 2022-11-18

## 2022-11-14 RX ORDER — GUAIFENESIN 600 MG/1
15 TABLET, EXTENDED RELEASE ORAL DAILY
Status: DISCONTINUED | OUTPATIENT
Start: 2022-11-14 | End: 2022-12-15 | Stop reason: HOSPADM

## 2022-11-14 RX ORDER — DEXTROSE MONOHYDRATE 100 MG/ML
INJECTION, SOLUTION INTRAVENOUS CONTINUOUS PRN
Status: DISCONTINUED | OUTPATIENT
Start: 2022-11-14 | End: 2022-11-19 | Stop reason: ALTCHOICE

## 2022-11-14 RX ORDER — HEPARIN SODIUM 10000 [USP'U]/100ML
0-5000 INJECTION, SOLUTION INTRAVENOUS CONTINUOUS
Status: DISCONTINUED | OUTPATIENT
Start: 2022-11-14 | End: 2022-11-14 | Stop reason: DRUGHIGH

## 2022-11-14 RX ORDER — VANCOMYCIN HYDROCHLORIDE 500 MG/10ML
500 INJECTION, POWDER, LYOPHILIZED, FOR SOLUTION INTRAVENOUS ONCE
Status: COMPLETED | OUTPATIENT
Start: 2022-11-14 | End: 2022-11-14

## 2022-11-14 RX ADMIN — VASOPRESSIN 2.4 UNITS/HR: 20 INJECTION INTRAVENOUS at 20:40

## 2022-11-14 RX ADMIN — PANTOPRAZOLE SODIUM 40 MG: 40 INJECTION, POWDER, FOR SOLUTION INTRAVENOUS at 08:05

## 2022-11-14 RX ADMIN — CALCIUM CHLORIDE, MAGNESIUM CHLORIDE, DEXTROSE MONOHYDRATE, LACTIC ACID, SODIUM CHLORIDE, SODIUM BICARBONATE AND POTASSIUM CHLORIDE 5000 ML: 5.15; 2.03; 22; 5.4; 6.46; 3.09; .157 INJECTION INTRAVENOUS at 22:01

## 2022-11-14 RX ADMIN — CALCIUM CHLORIDE, MAGNESIUM CHLORIDE, SODIUM CHLORIDE, SODIUM BICARBONATE, POTASSIUM CHLORIDE AND SODIUM PHOSPHATE DIBASIC DIHYDRATE 5000 ML: 3.68; 3.05; 6.34; 3.09; .314; .187 INJECTION INTRAVENOUS at 16:04

## 2022-11-14 RX ADMIN — METHYLPREDNISOLONE SODIUM SUCCINATE 62.5 MG: 125 INJECTION, POWDER, FOR SOLUTION INTRAMUSCULAR; INTRAVENOUS at 15:56

## 2022-11-14 RX ADMIN — CALCIUM CHLORIDE, MAGNESIUM CHLORIDE, SODIUM CHLORIDE, SODIUM BICARBONATE, POTASSIUM CHLORIDE AND SODIUM PHOSPHATE DIBASIC DIHYDRATE 5000 ML: 3.68; 3.05; 6.34; 3.09; .314; .187 INJECTION INTRAVENOUS at 03:47

## 2022-11-14 RX ADMIN — CALCIUM CHLORIDE, MAGNESIUM CHLORIDE, DEXTROSE MONOHYDRATE, LACTIC ACID, SODIUM CHLORIDE, SODIUM BICARBONATE AND POTASSIUM CHLORIDE 5000 ML: 5.15; 2.03; 22; 5.4; 6.46; 3.09; .157 INJECTION INTRAVENOUS at 04:53

## 2022-11-14 RX ADMIN — CALCIUM CHLORIDE, MAGNESIUM CHLORIDE, SODIUM CHLORIDE, SODIUM BICARBONATE, POTASSIUM CHLORIDE AND SODIUM PHOSPHATE DIBASIC DIHYDRATE 5000 ML: 3.68; 3.05; 6.34; 3.09; .314; .187 INJECTION INTRAVENOUS at 18:24

## 2022-11-14 RX ADMIN — NOREPINEPHRINE BITARTRATE 4 MG/250 ML (16 MCG/ML) IN 0.9 % NACL IV 0.35 MCG/KG/MIN: at 08:30

## 2022-11-14 RX ADMIN — NOREPINEPHRINE BITARTRATE 4 MG/250 ML (16 MCG/ML) IN 0.9 % NACL IV 0.35 MCG/KG/MIN: at 04:53

## 2022-11-14 RX ADMIN — CALCIUM CHLORIDE, MAGNESIUM CHLORIDE, SODIUM CHLORIDE, SODIUM BICARBONATE, POTASSIUM CHLORIDE AND SODIUM PHOSPHATE DIBASIC DIHYDRATE 5000 ML: 3.68; 3.05; 6.34; 3.09; .314; .187 INJECTION INTRAVENOUS at 05:56

## 2022-11-14 RX ADMIN — PIPERACILLIN AND TAZOBACTAM 4.5 G: 4; .5 INJECTION, POWDER, FOR SOLUTION INTRAVENOUS at 10:04

## 2022-11-14 RX ADMIN — EPOPROSTENOL 20 NG/KG/MIN: 1.5 INJECTION, POWDER, LYOPHILIZED, FOR SOLUTION INTRAVENOUS at 21:19

## 2022-11-14 RX ADMIN — CALCIUM CHLORIDE, MAGNESIUM CHLORIDE, SODIUM CHLORIDE, SODIUM BICARBONATE, POTASSIUM CHLORIDE AND SODIUM PHOSPHATE DIBASIC DIHYDRATE 5000 ML: 3.68; 3.05; 6.34; 3.09; .314; .187 INJECTION INTRAVENOUS at 13:46

## 2022-11-14 RX ADMIN — MINERAL OIL, PETROLATUM: 425; 573 OINTMENT OPHTHALMIC at 00:11

## 2022-11-14 RX ADMIN — METHYLPREDNISOLONE SODIUM SUCCINATE 62.5 MG: 125 INJECTION, POWDER, FOR SOLUTION INTRAMUSCULAR; INTRAVENOUS at 21:37

## 2022-11-14 RX ADMIN — MINERAL OIL, PETROLATUM: 425; 573 OINTMENT OPHTHALMIC at 23:27

## 2022-11-14 RX ADMIN — CALCIUM CHLORIDE, MAGNESIUM CHLORIDE, DEXTROSE MONOHYDRATE, LACTIC ACID, SODIUM CHLORIDE, SODIUM BICARBONATE AND POTASSIUM CHLORIDE: 5.15; 2.03; 22; 5.4; 6.46; 3.09; .157 INJECTION INTRAVENOUS at 11:24

## 2022-11-14 RX ADMIN — CHLORHEXIDINE GLUCONATE 0.12% ORAL RINSE 15 ML: 1.2 LIQUID ORAL at 19:46

## 2022-11-14 RX ADMIN — PROPOFOL 50 MCG/KG/MIN: 10 INJECTION, EMULSION INTRAVENOUS at 16:30

## 2022-11-14 RX ADMIN — Medication 0.03 MCG/KG/MIN: at 09:32

## 2022-11-14 RX ADMIN — VASOPRESSIN 2.4 UNITS/HR: 20 INJECTION INTRAVENOUS at 02:12

## 2022-11-14 RX ADMIN — PROPOFOL 50 MCG/KG/MIN: 10 INJECTION, EMULSION INTRAVENOUS at 04:35

## 2022-11-14 RX ADMIN — HEPARIN SODIUM 5000 UNITS: 5000 INJECTION, SOLUTION INTRAVENOUS; SUBCUTANEOUS at 04:43

## 2022-11-14 RX ADMIN — PROPOFOL 50 MCG/KG/MIN: 10 INJECTION, EMULSION INTRAVENOUS at 13:30

## 2022-11-14 RX ADMIN — NOREPINEPHRINE BITARTRATE 4 MG/250 ML (16 MCG/ML) IN 0.9 % NACL IV 0.27 MCG/KG/MIN: at 02:53

## 2022-11-14 RX ADMIN — VANCOMYCIN HYDROCHLORIDE 500 MG: 500 INJECTION, POWDER, LYOPHILIZED, FOR SOLUTION INTRAVENOUS at 14:17

## 2022-11-14 RX ADMIN — HEPARIN SODIUM 500 UNITS/HR: 10000 INJECTION, SOLUTION INTRAVENOUS at 13:13

## 2022-11-14 RX ADMIN — PIPERACILLIN AND TAZOBACTAM 4.5 G: 4; .5 INJECTION, POWDER, FOR SOLUTION INTRAVENOUS at 15:56

## 2022-11-14 RX ADMIN — CHLORHEXIDINE GLUCONATE 0.12% ORAL RINSE 15 ML: 1.2 LIQUID ORAL at 08:04

## 2022-11-14 RX ADMIN — PROPOFOL 50 MCG/KG/MIN: 10 INJECTION, EMULSION INTRAVENOUS at 07:22

## 2022-11-14 RX ADMIN — CALCIUM CHLORIDE, MAGNESIUM CHLORIDE, SODIUM CHLORIDE, SODIUM BICARBONATE, POTASSIUM CHLORIDE AND SODIUM PHOSPHATE DIBASIC DIHYDRATE 5000 ML: 3.68; 3.05; 6.34; 3.09; .314; .187 INJECTION INTRAVENOUS at 20:40

## 2022-11-14 RX ADMIN — PROPOFOL 50 MCG/KG/MIN: 10 INJECTION, EMULSION INTRAVENOUS at 22:09

## 2022-11-14 RX ADMIN — PROPOFOL 50 MCG/KG/MIN: 10 INJECTION, EMULSION INTRAVENOUS at 01:14

## 2022-11-14 RX ADMIN — FENTANYL CITRATE 50 MCG: 50 INJECTION INTRAMUSCULAR; INTRAVENOUS at 03:43

## 2022-11-14 RX ADMIN — CALCIUM CHLORIDE, MAGNESIUM CHLORIDE, SODIUM CHLORIDE, SODIUM BICARBONATE, POTASSIUM CHLORIDE AND SODIUM PHOSPHATE DIBASIC DIHYDRATE 5000 ML: 3.68; 3.05; 6.34; 3.09; .314; .187 INJECTION INTRAVENOUS at 08:23

## 2022-11-14 RX ADMIN — MINERAL OIL, PETROLATUM: 425; 573 OINTMENT OPHTHALMIC at 08:05

## 2022-11-14 RX ADMIN — CALCIUM CHLORIDE, MAGNESIUM CHLORIDE, SODIUM CHLORIDE, SODIUM BICARBONATE, POTASSIUM CHLORIDE AND SODIUM PHOSPHATE DIBASIC DIHYDRATE 5000 ML: 3.68; 3.05; 6.34; 3.09; .314; .187 INJECTION INTRAVENOUS at 22:55

## 2022-11-14 RX ADMIN — CALCIUM CHLORIDE, MAGNESIUM CHLORIDE, DEXTROSE MONOHYDRATE, LACTIC ACID, SODIUM CHLORIDE, SODIUM BICARBONATE AND POTASSIUM CHLORIDE 5000 ML: 5.15; 2.03; 22; 5.4; 6.46; 3.09; .157 INJECTION INTRAVENOUS at 09:46

## 2022-11-14 RX ADMIN — PIPERACILLIN AND TAZOBACTAM 4.5 G: 4; .5 INJECTION, POWDER, FOR SOLUTION INTRAVENOUS at 21:37

## 2022-11-14 RX ADMIN — MINERAL OIL, PETROLATUM: 425; 573 OINTMENT OPHTHALMIC at 16:32

## 2022-11-14 RX ADMIN — CALCIUM CHLORIDE, MAGNESIUM CHLORIDE, DEXTROSE MONOHYDRATE, LACTIC ACID, SODIUM CHLORIDE, SODIUM BICARBONATE AND POTASSIUM CHLORIDE 5000 ML: 5.15; 2.03; 22; 5.4; 6.46; 3.09; .157 INJECTION INTRAVENOUS at 11:24

## 2022-11-14 RX ADMIN — EPOPROSTENOL 20 NG/KG/MIN: 1.5 INJECTION, POWDER, LYOPHILIZED, FOR SOLUTION INTRAVENOUS at 16:14

## 2022-11-14 RX ADMIN — CALCIUM CHLORIDE, MAGNESIUM CHLORIDE, SODIUM CHLORIDE, SODIUM BICARBONATE, POTASSIUM CHLORIDE AND SODIUM PHOSPHATE DIBASIC DIHYDRATE 5000 ML: 3.68; 3.05; 6.34; 3.09; .314; .187 INJECTION INTRAVENOUS at 01:30

## 2022-11-14 RX ADMIN — Medication 15 ML: at 14:03

## 2022-11-14 RX ADMIN — NOREPINEPHRINE BITARTRATE 4 MG/250 ML (16 MCG/ML) IN 0.9 % NACL IV 0.27 MCG/KG/MIN: at 00:11

## 2022-11-14 RX ADMIN — PROPOFOL 50 MCG/KG/MIN: 10 INJECTION, EMULSION INTRAVENOUS at 10:11

## 2022-11-14 RX ADMIN — METHYLPREDNISOLONE SODIUM SUCCINATE 62.5 MG: 125 INJECTION, POWDER, FOR SOLUTION INTRAMUSCULAR; INTRAVENOUS at 03:51

## 2022-11-14 RX ADMIN — NOREPINEPHRINE BITARTRATE 4 MG/250 ML (16 MCG/ML) IN 0.9 % NACL IV 0.35 MCG/KG/MIN: at 06:45

## 2022-11-14 RX ADMIN — CALCIUM CHLORIDE, MAGNESIUM CHLORIDE, DEXTROSE MONOHYDRATE, LACTIC ACID, SODIUM CHLORIDE, SODIUM BICARBONATE AND POTASSIUM CHLORIDE 5000 ML: 5.15; 2.03; 22; 5.4; 6.46; 3.09; .157 INJECTION INTRAVENOUS at 16:34

## 2022-11-14 RX ADMIN — PROPOFOL 50 MCG/KG/MIN: 10 INJECTION, EMULSION INTRAVENOUS at 18:49

## 2022-11-14 RX ADMIN — PIPERACILLIN AND TAZOBACTAM 4.5 G: 4; .5 INJECTION, POWDER, FOR SOLUTION INTRAVENOUS at 04:16

## 2022-11-14 RX ADMIN — VASOPRESSIN 2.4 UNITS/HR: 20 INJECTION INTRAVENOUS at 11:15

## 2022-11-14 RX ADMIN — METHYLPREDNISOLONE SODIUM SUCCINATE 62.5 MG: 125 INJECTION, POWDER, FOR SOLUTION INTRAMUSCULAR; INTRAVENOUS at 10:04

## 2022-11-14 RX ADMIN — Medication 0.33 MCG/KG/MIN: at 17:30

## 2022-11-14 RX ADMIN — CALCIUM CHLORIDE, MAGNESIUM CHLORIDE, SODIUM CHLORIDE, SODIUM BICARBONATE, POTASSIUM CHLORIDE AND SODIUM PHOSPHATE DIBASIC DIHYDRATE 5000 ML: 3.68; 3.05; 6.34; 3.09; .314; .187 INJECTION INTRAVENOUS at 11:24

## 2022-11-14 ASSESSMENT — ACTIVITIES OF DAILY LIVING (ADL)
ADLS_ACUITY_SCORE: 47
ADLS_ACUITY_SCORE: 45
ADLS_ACUITY_SCORE: 47

## 2022-11-14 NOTE — PROGRESS NOTES
Renal Medicine Progress Note            Assessment/Plan:     Blanco Osborne is a 57-year-old male with PMH CARRILLO s/p liver transplant (9/2016) and cirrhosis admitted 11/12/2022 with out of hospital PEA arrest and acute hypoxemic respiratory failure.  Course complicated by parainfluenza virus, streptococcal bacteremia, and LORETTA requiring CRRT.     Anuric LORETTA  Due to ATN in setting of PEA arrest and persistent shock.  Urine output negligible.  Remains at a net positive fluid balance despite CRRT due to issues with filter clotting and high pressor requirements.  -CRRT  -Volume goal net even  -Adding heparin drip at fixed rate to prevent filter clotting  -Every 8 hours CRRT labs  -Renally dose meds    PEA arrest  Septic shock  Cardiogenic shock  Currently on Levophed and vasopressin.  Rewarming started 11/13.    Lactic acidosis, resolved  Improved with CRRT.    ESLD due to CARRILLO s/p liver transplant   - hold tacrolimus  -Tac trough    Hypoxic respiratory failure  ARDS  Vent management per ICU.  High FiO2 requirements with net positive fluid balance despite CRRT.  Attempting net even on CRRT if able.    FEN  K4.5, Phos elevated 4.6, mag elevated 2.4 all despite CRRT.  Likely due to multiple interruptions in CRRT.  Would continue to trend labs, no changes for now.      Dwayne Laboy MD   [facility]  Office: [phone]          Interval History:     -Filter clotted overnight and then again this a.m., heparin added  -Significant increase in WBC, patient recultured  -No urine output  -High vent settings with an FiO2 of 75%            Medications and Allergies:       albuterol  6 puff Inhalation Q4H     artificial tears   Both Eyes Q8H     chlorhexidine  15 mL Mouth/Throat Q12H     heparin  1.3-2.6 mL Intracatheter Once in dialysis/CRRT     heparin  1.3-2.6 mL Intracatheter Once in dialysis/CRRT     heparin ANTICOAGULANT  5,000 Units Subcutaneous Q8H     heparin ANTICOAGULANT Loading dose  6,000 Units  Intravenous Once     methylPREDNISolone  62.5 mg Intravenous Q6H     pantoprazole  40 mg Per Feeding Tube QAM AC    Or     pantoprazole  40 mg Intravenous QAM AC     piperacillin-tazobactam  4.5 g Intravenous Q6H     vancomycin place richards - receiving intermittent dosing  1 each Intravenous See Admin Instructions      No Known Allergies         Physical Exam:   Vitals were reviewed  /58   Pulse 66   Temp 98.6  F (37  C)   Resp 24   Ht 1.829 m (6')   Wt 107 kg (235 lb 14.3 oz)   SpO2 96%   BMI 31.99 kg/m      Wt Readings from Last 3 Encounters:   11/14/22 107 kg (235 lb 14.3 oz)   10/07/19 137.5 kg (303 lb 3.2 oz)   10/04/19 131.5 kg (290 lb)       Intake/Output Summary (Last 24 hours) at 11/14/2022 1136  Last data filed at 11/14/2022 1100  Gross per 24 hour   Intake 4097.12 ml   Output 2345 ml   Net 1752.12 ml       GENERAL APPEARANCE: intubated, sedated   HEENT:  ETT in place, slight rust color in tube  RESP: coarse bilaterally   CV: RRR  ABDOMEN: soft, nontender, nondistended   EXTREMITIES/SKIN: no LE edema  NEURO:  sedated  LINES:  + gomez, right femoral dialysis catheter           Data:     BMP  Recent Labs   Lab 11/14/22  1020 11/14/22  0735 11/14/22  0201 11/13/22  1706 11/13/22  1701 11/13/22  0828     --  136  --  137 138   POTASSIUM 4.5  --  4.1  --  3.7 3.7   CHLORIDE 104  --  102  --  102 103   KELLY 8.0*  --  8.4*  --  8.3* 8.6   CO2 20  --  20  --  18* 21   BUN 52*  --  66*  --  82* 83*   CR 2.79*  --  3.50*  --  4.44* 4.74*   * 94 113* 127* 152* 138*     CBC  Recent Labs   Lab 11/14/22  1020 11/14/22  0657 11/13/22  0828 11/12/22  2256   WBC 58.9* 56.8* 16.3* 18.7*   HGB 12.2* 12.5* 11.3* 11.4*   HCT 35.5* 37.3* 36.0* 36.1*   * 103* 105* 105*   * 167 115* 193     Lab Results   Component Value Date    AST 80 (H) 11/13/2022    ALT 36 11/13/2022    ALKPHOS 184 (H) 11/14/2022    BILITOTAL 2.8 (H) 11/13/2022     Lab Results   Component Value Date    INR 1.50 (H)  11/14/2022       Attestation:  I have reviewed today's vital signs, notes, medications, labs and imaging.    Dwayne Laboy MD  [facility] Nephrology  Office: [phone]

## 2022-11-14 NOTE — PLAN OF CARE
Pt remains sedated propofol to attain RASS goal. Withdraws from pain in LE. PERRL. Remains on norepinephrine and vasopressin to maintain MAP > 65. Tele SR. Lung sounds coarse. FiO2 changed from 80% to 70% this shift. Order placed to start velitri. Anuric this shift. Tolerating CRRT goal of net 0. Filter changed this am due to clotting - heparin gtt started to help keep circuit patent. Trickle feet started this afternoon. Pt reached normothermia at 1100. Blood cxs redrawn d2 increase in WBCs. Vanco started. Wife and brother at bedside - updated on plan of care.

## 2022-11-14 NOTE — PLAN OF CARE
Physical Therapy/Occupational therapy: Orders received. Chart reviewed and discussed with care team.? Physical Therapy not indicated due to current medical status including soft blood pressures with movement, sedation and intubation. Per RN, not appropriate for several days. PT/OT orders completed. Please reorder once patient is able to make progress towards mobility and ADL goals.?Will complete orders.

## 2022-11-14 NOTE — PLAN OF CARE
Neuro: pt remains sedated. Sluggish pupils, slight contraction with painful stimuli  CV: sinus rhythm. Levo adjusted for map >65.   Resp: abg reported this am. Remains on peep 16. sats 95%  GI/: little to no UOP. NG to lis for bile looking output.  CRRT: set changed at 2300. Pt tolerating well. However when turned to pts left side, arterial bp decreased requiring increase in Levophed.

## 2022-11-14 NOTE — PLAN OF CARE
Care provided from [id number]    Neuro: patient remains sedated with RASS -4/-5. Withdraws from painful stimulus. Continuous EEG monitoring,.   CV: Sinus Bradycardia with occasional PVC's. Blood pressure supported with levo and vaso.   Pulm: coarse lungs sounds with scant brown secretions. Peep 16 FiO2 80  GI/: gomez with little to no urine output. NG placed to LIS with dark red output.  CRRT: pulling net 0. Patient tolerating well.   Additional: started rewarming today at 1330 by 0.5. Patient warming up slower than 0.5 an hour.

## 2022-11-14 NOTE — PROGRESS NOTES
[facility] ICU RESPIRATORY NOTE        Date of Admission: 11/12/2022    Date of Intubation (most recent): 11/12/2022    Reason for Mechanical Ventilation: Resp failure    Number of Days on Mechanical Ventilation: No    Met Criteria for Spontaneous Breathing Trial: No    Reason for No Spontaneous Breathing Trial: PEEP of 16    Significant Events Today: Veletri started; full-strength    ABG Results:   Recent Labs   Lab 11/14/22  0551 11/13/22  0827 11/12/22 2011 11/12/22  1656   PH 7.20* 7.24* 7.21* 7.13*   PCO2 53* 46* 52* 62*   PO2 74* 92 72* 107*   HCO3 21 20* 21 20*   O2PER 80 75 80 100       Current Vent Settings: Vent Mode: CMV/AC  (Continuous Mandatory Ventilation/ Assist Control)  FiO2 (%): 70 %  Resp Rate (Set): 24 breaths/min  Tidal Volume (Set, mL): 400 mL  PEEP (cm H2O): 16 cmH2O  Resp: 23      Skin Assessment: No issues    Plan: RT to follow and wean as tolerated    Guido Mclain, RT on 11/14/2022 at 5:51 PM

## 2022-11-14 NOTE — PROGRESS NOTES
[facility] ICU RESPIRATORY NOTE        Date of Admission: 11/12/2022    Date of Intubation (most recent): 11/12/2022    Reason for Mechanical Ventilation: Respiratory failure.    Number of Days on Mechanical Ventilation: 3    Met Criteria for Spontaneous Breathing Trial: No    Reason for No Spontaneous Breathing Trial: Peep of 16.    Significant Events Today: None.     ABG Results:   Recent Labs   Lab 11/13/22  0827 11/12/22 2011 11/12/22  1656 11/12/22  1502   PH 7.24* 7.21* 7.13* 7.14*   PCO2 46* 52* 62* 58*   PO2 92 72* 107* 83   HCO3 20* 21 20* 20*   O2PER 75 80 100 100       Current Vent Settings: Vent Mode: CMV/AC  (Continuous Mandatory Ventilation/ Assist Control)  FiO2 (%): 80 %  Resp Rate (Set): 20 breaths/min  Tidal Volume (Set, mL): 400 mL  PEEP (cm H2O): 16 cmH2O  Resp: 21      Skin Assessment: Clean and intact.     Plan: Continue full ventilatory support and wean as tolerated.     Arvin High, RT on 11/14/2022 at 3:42 AM

## 2022-11-14 NOTE — PROGRESS NOTES
ICU Progress Note    Date of Service: 11/14/22    A/P:  57M cirrhosis, CARRILLO s/p liver txp (9/2016) admitted to ICU 11/12/22 after out of hospital PEA arrest and acute hypoxemic respiratory failure. Course c/b LORETTA requiring CRRT. Found to have Parainfluenza virus and Streptococcal bacteremia.     I have personally reviewed the daily labs, imaging studies, cultures and discussed the case with referring physician and consulting physicians.     Neuro  # Sedation for mechanical ventilation  # Post-arrest anoxic brain injury  - propofol gtt  - fentanyl IVP prn   - EEG   - neurocritical care c/s     Pulmonary  # Acute hypoxemic respiratory failure  # ARDS  # Bilateral pleural effusions  - vent settings below  - P:F meets criteria for proning, however, pt very labile with any movement, will continue to monitor supine for clinical stability prior to considering proning  - methylprednisolone 62.5mg q6h  - albuterol q4h   - consider thoracentesis pending clinical course     Cardiac  # Out of hospital PEA arrest  # Cardiogenic shock  # Septic shock  # Therapeutic temperature management  - MAP > 65  - norepinephrine gtt  - vasopressin gtt    Renal  # LORETTA - in the setting of PEA arrest and shock state  - monitor UOP, Cr, I/O  - CRRT  - nephrology c/s      GI  # Protein calorie malnutrition   # CARRILLO s/p liver txp - 9/2016  - TF managed by RD   - holding tacrolimus, on admission level 3.6    Heme/Onc  # Leukocytosis - 2/2 sepsis, profound increase this AM  - monitor CBC    ID  # Streptococcal bacteremia  # Parainfluenza virus   - pip-tazo and vanc  - re-Cx with increase in WBC    Endo  # Stress induced hyperglycemia   - monitor BG    PPX  1. DVT: SQH    2. VAP: HOB 30 degrees, chlorhexidine rinse  3. Stress Ulcer: PPI  4. Restraints: Nonviolent soft two point restraints required and necessary for patient safety and continued cares and good effect as patient continues to pull at necessary lines, tubes despite education and  distraction. Will readdress daily.   5. Wound care - per unit routine   6. Feeding - TF  7. Family updated.    Interval Hx:  Pt rewarming ON, currently 37. Profound increase in leukocytosis.      Unable to obtain ROS 2/2 sedation/intubation.     /59 (BP Location: Left arm)   Pulse 69   Temp 97.9  F (36.6  C)   Resp 20   Ht 1.829 m (6')   Wt 107 kg (235 lb 14.3 oz)   SpO2 96%   BMI 31.99 kg/m    Gen: supine, NAD  Neuro: sedated, pupils equal  HEENT: anicteric  Card: RRR  Pulm: clear b/l  Abd: soft, non-distended  MSK: no edema, no acute joint abnormality  Skin: no obvious rash    Vent Mode: CMV/AC  (Continuous Mandatory Ventilation/ Assist Control)  FiO2 (%): 80 %  Resp Rate (Set): 20 breaths/min  Tidal Volume (Set, mL): 400 mL  PEEP (cm H2O): 16 cmH2O  Resp: 20        Intake/Output Summary (Last 24 hours) at 11/14/2022 0732  Last data filed at 11/14/2022 0700  Gross per 24 hour   Intake 3930.91 ml   Output 2260 ml   Net 1670.91 ml       Labs: reviewed    Imaging: reviewed    Billing: Patient is critically ill. Total critical care time today, excluding procedures, was 60 minutes.    Ki Watts MD  Pulmonary Disease and Critical Care Medicine   [facility]

## 2022-11-14 NOTE — PROGRESS NOTES
"      [facility]    Stroke Progress Note    Interval EventsRewarming protocol started at 1530 yesterday. Initial EEG read without evidence of seizures. Remains heavily sedated on propofol, hospital team preferring to not hold sedation for exam at this timed due to comfort/high vent setting. Exam unchanged.     HPI Summary  Blanco Osborne is a 57 year old male with PMH of nonalcoholic fatty liver disease s/p transplant (9/2016) and cirrhosis. He presented to the ED 11/12/22 for evaluation of cardiac arrest. He experienced SOB and weakness and called EMS; when firefighters were escorting him he collapsed and was found to be in cardiac arrest-CPR was immediately initiated and when EMS arrived 20 minutes later he was found to be in PEA treated with epi and bicarb and pulse returned. Family also noted that for the past week he had fever, fatigue, cough, confusion. Hypothermia protocol was initiated and he was admitted.      Evaluation Summarized  CT head 11/12: no evidence of cerebral edema or loss of gray-white differentiation   EEG: No epileptiform discharges or electrographic seizures were recorded; Findings are consistent with severe diffuse nonspecific encephalopathy     Impression  1. S/p cardiac arrest, currently under cooling protocol. Plan to rewarm starting this PM    Recommendations  -Q2 hour neurochecks  -STAT head CT for change in neurological excam    Patient Follow-up    - final recommendation pending work-up    We will continue to follow.     Martha JETER, CNP  Vascular Neurology  To page me or covering stroke neurology team member, click here: AMCOM   Choose \"On Call\" tab at top, then search dropdown box for \"Neurology Adult\", select location, press Enter, then look for stroke/neuro ICU/telestroke.  ______________________________________________________    Clinically Significant Risk Factors Present on Admission                 Medications   Scheduled Meds    albuterol  " 6 puff Inhalation Q4H     artificial tears   Both Eyes Q8H     chlorhexidine  15 mL Mouth/Throat Q12H     heparin  1.3-2.6 mL Intracatheter Once in dialysis/CRRT     heparin  1.3-2.6 mL Intracatheter Once in dialysis/CRRT     heparin ANTICOAGULANT  5,000 Units Subcutaneous Q8H     methylPREDNISolone  62.5 mg Intravenous Q6H     pantoprazole  40 mg Per Feeding Tube QAM AC    Or     pantoprazole  40 mg Intravenous QAM AC     piperacillin-tazobactam  4.5 g Intravenous Q6H     vancomycin place richards - receiving intermittent dosing  1 each Intravenous See Admin Instructions       Infusion Meds    CRRT replacement solution 5,000 mL (11/14/22 0453)     - MEDICATION INSTRUCTIONS -       - MEDICATION INSTRUCTIONS -       norepinephrine       CRRT replacement solution 200 mL/hr at 11/13/22 1554     CRRT replacement solution 5,000 mL (11/14/22 0556)     propofol 50 mcg/kg/min (11/14/22 0726)     sodium chloride 20 mL/hr at 11/14/22 0725     vasopressin 2.4 Units/hr (11/14/22 0212)       PRN Meds  sodium chloride 0.9%, calcium gluconate, calcium gluconate, glucose **OR** dextrose **OR** glucagon, fentaNYL, fentaNYL, magnesium sulfate, - MEDICATION INSTRUCTIONS -, - MEDICATION INSTRUCTIONS -, metoprolol, naloxone **OR** naloxone **OR** naloxone **OR** naloxone, ondansetron **OR** ondansetron, potassium chloride, prochlorperazine **OR** prochlorperazine **OR** prochlorperazine, senna-docusate **OR** senna-docusate, sodium phosphate, vecuronium, vecuronium       PHYSICAL EXAMINATION  Temp:  [91.4  F (33  C)-98.2  F (36.8  C)] 97.7  F (36.5  C)  Pulse:  [45-74] 71  Resp:  [17-41] 18  BP: (108-120)/(59-65) 117/65  Cuff Mean (mmHg):  [78-82] 79  MAP:  [47 mmHg-87 mmHg] 64 mmHg  Arterial Line BP: ()/(45-63) 90/49  FiO2 (%):  [65 %-100 %] 80 %  SpO2:  [87 %-100 %] 95 %      General Exam  General:  patient lying in bed without any acute distress    HEENT:  normocephalic/atraumatic  Pulmonary:  on mechanical ventilation    Neuro  Exam  Mental Status:  Unresponsive on sedation  Cranial Nerves: gaze conjugate, pupils 3mm bilaterally with sluggish reactivity to light, face appears grossly symmetric, weak cough with suction  Motor: no movement in BUE, presumed small reflexive movements in BLE with noxious stimuli  Sensory: see motor    Imaging  I personally reviewed all imaging; relevant findings per HPI.     Lab Results Data   CBC  Recent Labs   Lab 11/13/22  0828 11/12/22  2256 11/12/22  1502   WBC 16.3* 18.7* 18.7*   RBC 3.44* 3.43* 3.40*   HGB 11.3* 11.4* 11.3*   HCT 36.0* 36.1* 35.6*   * 193 215     Basic Metabolic Panel    Recent Labs   Lab 11/14/22  0735 11/14/22  0201 11/13/22  1706 11/13/22  1701 11/13/22  0828   NA  --  136  --  137 138   POTASSIUM  --  4.1  --  3.7 3.7   CHLORIDE  --  102  --  102 103   CO2  --  20  --  18* 21   BUN  --  66*  --  82* 83*   CR  --  3.50*  --  4.44* 4.74*   GLC 94 113* 127* 152* 138*   KELLY  --  8.4*  --  8.3* 8.6     Liver Panel  Recent Labs   Lab 11/14/22  0201 11/13/22  1701 11/13/22  0828 11/12/22  1502 11/12/22  1147   PROTTOTAL  --   --  6.0* 7.0 7.4   ALBUMIN 2.2* 2.0* 2.0* 2.3* 2.4*   BILITOTAL  --   --  2.8* 1.9* 1.5*   ALKPHOS 184*  --  127 187* 223*   AST  --   --  80* 86* 61*   ALT  --   --  36 34 27     INR    Recent Labs   Lab Test 11/14/22  0201 11/13/22  1701 11/13/22  0516   INR 1.47* 1.79* 1.84*      Lipid Profile    Recent Labs   Lab Test 04/20/20  1157 08/01/19  1500 01/08/19  0840 10/31/17  1037 03/01/16  0648   CHOL 161  --   --  134 199   HDL 42  --   --  57 72   LDL 81  --   --  58 111*   TRIG 192* 177* 135 92 82     A1C    Recent Labs   Lab Test 09/22/16  0741   A1C 4.8     Troponin    Recent Labs   Lab 11/12/22  1147   TROPONINIS 10          Data

## 2022-11-14 NOTE — PHARMACY-VANCOMYCIN DOSING SERVICE
"Pharmacy Vancomycin Note  Date of Service 2022  Patient's  1965   57 year old, male    Indication: Sepsis  Day of Therapy: 3  Current vancomycin regimen: Intermittent dosing based on levels   Current vancomycin monitoring method: CRRT trough level monitoring   Current vancomycin therapeutic monitoring goal: 15-20 mg/L    Current estimated CrCl = Estimated Creatinine Clearance: 36.5 mL/min (A) (based on SCr of 2.79 mg/dL (H)).    Creatinine for last 3 days  2022: 11:47 AM Creatinine 5.06 mg/dL;  3:02 PM Creatinine 4.68 mg/dL  2022:  8:28 AM Creatinine 4.74 mg/dL;  5:01 PM Creatinine 4.44 mg/dL  2022:  2:01 AM Creatinine 3.50 mg/dL; 10:20 AM Creatinine 2.79 mg/dL    Recent Vancomycin Levels (past 3 days)  2022:  5:16 AM Vancomycin 23.0 mg/L  2022:  5:51 AM Vancomycin 19.8 mg/L; 10:20 AM Vancomycin 18.1 mg/L    Vancomycin IV Administrations (past 72 hours)                   vancomycin (VANCOCIN) 500 mg vial to attach to  mL bag (mg) 500 mg New Bag 22 1234    vancomycin (VANCOCIN) 2,500 mg in 0.9% NaCl 500 mL intermittent infusion (mg) 2,500 mg New Bag 22 1252                Nephrotoxins and other renal medications (From now, onward)    Start     Dose/Rate Route Frequency Ordered Stop    22 1230  vancomycin (VANCOCIN) 500 mg vial to attach to  mL bag         500 mg  over 1 Hours Intravenous ONCE 22 1144      22 0900  norepinephrine (LEVOPHED) 16 mg in  mL infusion MAX CONC CENTRAL LINE         0.01-0.6 mcg/kg/min × 101 kg  0.9-56.8 mL/hr  Intravenous CONTINUOUS 22 0831      22 2200  piperacillin-tazobactam (ZOSYN) 4.5 g vial to attach to  mL bag        Note to Pharmacy: For [facility], [facility] and [facility]: For Zosyn-naive patients, use the \"Zosyn initial dose + extended infusion\" order panel.    4.5 g  over 30 Minutes Intravenous EVERY 6 HOURS 22 1744      22 1530  vasopressin 0.2 units/mL in NS (PITRESSIN) " standard conc infusion         2.4 Units/hr  12 mL/hr  Intravenous CONTINUOUS 11/12/22 1526      11/12/22 1457  vancomycin place richards - receiving intermittent dosing         1 each Intravenous SEE ADMIN INSTRUCTIONS 11/12/22 1457               Contrast Orders - past 72 hours (72h ago, onward)    None          Interpretation of levels and current regimen:  Vancomycin level is reflective of therapeutic level    Has serum creatinine changed greater than 50% in last 72 hours: No    Urine output:  diminished urine output    Renal Function: Stable      Plan:  1. Give 500 mg IV X once  2. Vancomycin monitoring method: Renal Replacement Therapy trough monitoring   3. Vancomycin therapeutic monitoring goal: 15-20 mg/L  4. Pharmacy will check vancomycin levels as appropriate with AM labs tomorrow. .  5. Serum creatinine levels will be ordered daily for the first week of therapy and at least twice weekly for subsequent weeks.    Columba Cornelius RPH

## 2022-11-14 NOTE — PROGRESS NOTES
Right wrist and Left wrist restraints initiated on patient on 11/14/2022 at 0:00 AM    Clinical Justification: Pulling lines, pulling tubes, and pulling equipment  Less Restrictive Alternative: Repositioning, 1:1 patient care, Reorientation  Attending Physician Notified: Yes, Attending Physician's Name: Dr Mary   Order received: Yes     Family Notification: Other   Criteria explained to Patient  Patient's Response: No evidence of learning  Restraint care Plan initiated: Yes    Hubert Liu RN

## 2022-11-14 NOTE — PROGRESS NOTES
[facility] EEG #  [id number]  DAY 1 completed.    Pt discontinued from monitoring at 8:58am

## 2022-11-14 NOTE — PROVIDER NOTIFICATION
Provider Notification    Notified Person Name:  Dr. Anguiano    Notification Date/Time:  11/13 1820    Notification Interaction:  Verbal    Purpose of Notification: Patient started to desat. fiO2 increased from  and O2 sat continued to stay 84-88.     Orders Received: Increase Peep 16

## 2022-11-14 NOTE — CONSULTS
CLINICAL NUTRITION SERVICES  -  ASSESSMENT NOTE    Recommendations Ordered by Registered Dietitian (RD):   Begin Promote @ 10 mL/hr and hold at this rate   FWF 60 mL q 4 hours for tube patency   Certavite 15 mL/day for micronutrient needs     Promote @ 10 mL/hr (240 mL/day) = 240 kcal, 15 g PRO (0.2 g/kg), 31 g CHO, 201 mL free water and no fiber daily   + Propofol at 30.3 mL/hr = 800 kcal  TOTAL = 1040 kcal (12 kcal/kg)    Future/Additional Recommendations:   Recommend eventual goal (pending propofol & hemodynamic stability) -   Promote @ goal of  80ml/hr  (1920ml/day)  will provide: 1920 kcals (22 kcal/kg), 121 g PRO (1.4 g/kg), 1610 ml free H20, 249 g CHO, and 0 g fiber daily.    If renal formula indicated, recommend -   Novasource Renal @ 40 ml/hr (960 ml) provides 1920 kcal, 87 g pro, 176 g CHO, 688 ml free water, and 0 g fiber daily.   + 2 packets [id number] = 160 kcal and 40 g PRO  TOTAL = 2080 kcal (24 kcal/kg) and 127 g PRO (1.5 g/kg)   Malnutrition:   % Weight Loss:  Weight loss does not meet criteria for malnutrition - intentional weight loss over past >1 year   % Intake:  Decreased intake does not meet criteria for malnutrition - intentional calorie deficit   Subcutaneous Fat Loss:  None observed  Muscle Loss:  None observed  Fluid Retention:  Mild     Malnutrition Diagnosis: Patient does not meet two of the above criteria necessary for diagnosing malnutrition       REASON FOR ASSESSMENT  Blanco Osborne is a 57 year old male seen by Registered Dietitian for Provider Order - Registered Dietitian to Assess and Order TF per Medical Nutrition Therapy Protocol      NUTRITION HISTORY  - Information obtained from chart review and patient's spouse and brother at bedside this morning.   - Noted patient was dealing with an upper respiratory infection for ~1 week prior to admission and not feeling well - suspect some decreased appetite from baseline during this time. Admitted after an out of hospital cardiac  "arrest.   - Family state that patient has been dieting for the past year or so in attempt to lose weight. He mostly eats protein bars and drinks protein shakes (Ensure Max). Will have some cantaloupe in the morning with his medications but not much. Him and his spouse will dine out a few times during the week and patient will eat some fish or prime rib otherwise sounds to have pretty minimal oral intake by choice.    - No known food allergies noted.       CURRENT NUTRITION ORDERS  Diet Order:     NPO day #2  Plan start trophic TF today - 16 Fr NGT in place previously to LIS       NUTRITION FOCUSED PHYSICAL ASSESSMENT FOR DIAGNOSING MALNUTRITION)  Yes           Observed:    No nutrition-related physical findings observed    Obtained from Chart/Interdisciplinary Team:  Hx of cirrohsis and NAFLD s/p liver transplant (2016)  Trace to mild edema     11/12: Intubated by EMS in the field   11/12: Cooled   11/12: CTA = NG tube is in the stomach   11/13: Began re-warming   11/13: CRRT initiated     ANTHROPOMETRICS  Height: 6' 0\"  Weight: 235 lbs 14.28 oz  Body mass index is 31.99 kg/m .  Weight Status:  Obesity Grade I BMI 30-34.9  IBW: 80.9 kg (178 lb) +/- 10%  % IBW: 132%  Weight History: Limited weight hx on file. Overall down 52# in the past >1 year (18% weight loss) - unclear if this was intentional or not. Admit weight on 11/12 was 101 kg (BMI 30.19).   Wt Readings from Last 10 Encounters:   11/14/22 107 kg (235 lb 14.3 oz)   10/07/19 137.5 kg (303 lb 3.2 oz)   10/04/19 131.5 kg (290 lb)   02/20/19 140.4 kg (309 lb 9.6 oz)   07/10/18 137.5 kg (303 lb 3.2 oz)   10/31/17 133.8 kg (295 lb)   10/17/17 (!) 136.7 kg (301 lb 4.8 oz)   09/19/17 133.4 kg (294 lb)   07/18/17 129.7 kg (286 lb)   05/31/17 122.5 kg (270 lb)     Per Care Everywhere --   130.2 kg (287 lb) 05/07/2021     LABS  Labs reviewed  K+ 4.5  Mg 2.4 (H), Phos 4.6 (H)  BUN 52 (H), Cr 2.79 (H) - LORETTA        MEDICATIONS  Medications reviewed  Solumedrol "   Norepi (0.27 mcg/kg/min)  Propofol at 30.3 mL/hr = 800 kcal   Vasopressin       ASSESSED NUTRITION NEEDS PER APPROVED PRACTICE GUIDELINES:    Dosing Weight 86 kg (adjusted - using admit wt of 101 kg and IBW 81 kg)  Estimated Energy Needs: 9177-2684 kcals (20-25 Kcal/Kg)  Justification: overweight and vented   Estimated Protein Needs: 100-130 grams protein (1.2-1.5 g pro/Kg)  Justification: hypercatabolism with critical illness and preservation of lean body mass  Estimated Fluid Needs: 0694-8748  mL (1 mL/Kcal)  Justification: maintenance or per provider pending fluid status       NUTRITION DIAGNOSIS:  Inadequate protein-energy intake related to NPO on mechanical ventilation as evidenced by currently meeting 47% energy needs and 0% protein needs with propofol, plan to begin trophic TF today       NUTRITION INTERVENTIONS  Recommendations / Nutrition Prescription  Begin Promote @ 10 mL/hr and hold at this rate   FWF 60 mL q 4 hours for tube patency   Certavite 15 mL/day for micronutrient needs     Promote @ 10 mL/hr (240 mL/day) = 240 kcal, 15 g PRO (0.2 g/kg), 31 g CHO, 201 mL free water and no fiber daily   + Propofol at 30.3 mL/hr = 800 kcal  TOTAL = 1040 kcal (12 kcal/kg)     Recommend eventual goal (pending propofol & hemodynamic stability) -   Promote @ goal of  80ml/hr  (1920ml/day)  will provide: 1920 kcals (22 kcal/kg), 121 g PRO (1.4 g/kg), 1610 ml free H20, 249 g CHO, and 0 g fiber daily.    If renal formula indicated, recommend -   Novasource Renal @ 40 ml/hr (960 ml) provides 1920 kcal, 87 g pro, 176 g CHO, 688 ml free water, and 0 g fiber daily.   + 2 packets [id number] = 160 kcal and 40 g PRO  TOTAL = 2080 kcal (24 kcal/kg) and 127 g PRO (1.5 g/kg)      Implementation  Nutrition education: Provided education on RD and role of care to patient's family at bedside. Discussed TF initiation and role in critical illness.   EN Composition, EN Schedule, Feeding Tube Flush, Multivitamin/Mineral: TF orders  written as above  Collaboration and Referral of Nutrition care: Patient discussed this morning during interdisciplinary rounds       Nutrition Goals  TF + Propofol will meet % nutrition needs in the next 3-4 days       MONITORING AND EVALUATION:  Progress towards goals will be monitored and evaluated per protocol and Practice Guidelines      Sirena Gomez RD, LD

## 2022-11-15 ENCOUNTER — APPOINTMENT (OUTPATIENT)
Dept: GENERAL RADIOLOGY | Facility: CLINIC | Age: 57
DRG: 004 | End: 2022-11-15
Attending: INTERNAL MEDICINE
Payer: COMMERCIAL

## 2022-11-15 LAB
ALBUMIN SERPL-MCNC: 2 G/DL (ref 3.4–5)
ALP SERPL-CCNC: 172 U/L (ref 40–150)
ALT SERPL W P-5'-P-CCNC: 33 U/L (ref 0–70)
ANION GAP SERPL CALCULATED.3IONS-SCNC: 11 MMOL/L (ref 3–14)
ANION GAP SERPL CALCULATED.3IONS-SCNC: 14 MMOL/L (ref 3–14)
ANION GAP SERPL CALCULATED.3IONS-SCNC: 14 MMOL/L (ref 3–14)
AST SERPL W P-5'-P-CCNC: 62 U/L (ref 0–45)
ATRIAL RATE - MUSE: 48 BPM
BASE EXCESS BLDA CALC-SCNC: -5.5 MMOL/L (ref -9–1.8)
BASE EXCESS BLDA CALC-SCNC: -6.1 MMOL/L (ref -9–1.8)
BASOPHILS # BLD AUTO: 0 10E3/UL (ref 0–0.2)
BASOPHILS # BLD MANUAL: 0 10E3/UL (ref 0–0.2)
BASOPHILS NFR BLD AUTO: 0 %
BASOPHILS NFR BLD MANUAL: 0 %
BILIRUB DIRECT SERPL-MCNC: 2.3 MG/DL (ref 0–0.2)
BILIRUB SERPL-MCNC: 3 MG/DL (ref 0.2–1.3)
BUN SERPL-MCNC: 32 MG/DL (ref 7–30)
BUN SERPL-MCNC: 35 MG/DL (ref 7–30)
BUN SERPL-MCNC: 39 MG/DL (ref 7–30)
CA-I BLD-MCNC: 4.5 MG/DL (ref 4.4–5.2)
CA-I BLD-MCNC: 4.6 MG/DL (ref 4.4–5.2)
CA-I BLD-MCNC: 4.8 MG/DL (ref 4.4–5.2)
CALCIUM SERPL-MCNC: 8.3 MG/DL (ref 8.5–10.1)
CALCIUM SERPL-MCNC: 8.3 MG/DL (ref 8.5–10.1)
CALCIUM SERPL-MCNC: 8.4 MG/DL (ref 8.5–10.1)
CHLORIDE BLD-SCNC: 102 MMOL/L (ref 94–109)
CHLORIDE BLD-SCNC: 102 MMOL/L (ref 94–109)
CHLORIDE BLD-SCNC: 103 MMOL/L (ref 94–109)
CO2 SERPL-SCNC: 19 MMOL/L (ref 20–32)
CO2 SERPL-SCNC: 19 MMOL/L (ref 20–32)
CO2 SERPL-SCNC: 20 MMOL/L (ref 20–32)
CREAT SERPL-MCNC: 1.42 MG/DL (ref 0.66–1.25)
CREAT SERPL-MCNC: 1.6 MG/DL (ref 0.66–1.25)
CREAT SERPL-MCNC: 1.9 MG/DL (ref 0.66–1.25)
DIASTOLIC BLOOD PRESSURE - MUSE: NORMAL MMHG
EOSINOPHIL # BLD AUTO: 0.1 10E3/UL (ref 0–0.7)
EOSINOPHIL # BLD MANUAL: 0 10E3/UL (ref 0–0.7)
EOSINOPHIL NFR BLD AUTO: 0 %
EOSINOPHIL NFR BLD MANUAL: 0 %
ERYTHROCYTE [DISTWIDTH] IN BLOOD BY AUTOMATED COUNT: 14.7 % (ref 10–15)
ERYTHROCYTE [DISTWIDTH] IN BLOOD BY AUTOMATED COUNT: 14.8 % (ref 10–15)
ERYTHROCYTE [DISTWIDTH] IN BLOOD BY AUTOMATED COUNT: 14.9 % (ref 10–15)
ERYTHROCYTE [DISTWIDTH] IN BLOOD BY AUTOMATED COUNT: 14.9 % (ref 10–15)
GFR SERPL CREATININE-BSD FRML MDRD: 40 ML/MIN/1.73M2
GFR SERPL CREATININE-BSD FRML MDRD: 50 ML/MIN/1.73M2
GFR SERPL CREATININE-BSD FRML MDRD: 57 ML/MIN/1.73M2
GLUCOSE BLD-MCNC: 120 MG/DL (ref 70–99)
GLUCOSE BLD-MCNC: 161 MG/DL (ref 70–99)
GLUCOSE BLD-MCNC: 177 MG/DL (ref 70–99)
GLUCOSE BLDC GLUCOMTR-MCNC: 136 MG/DL (ref 70–99)
GLUCOSE BLDC GLUCOMTR-MCNC: 149 MG/DL (ref 70–99)
GLUCOSE BLDC GLUCOMTR-MCNC: 153 MG/DL (ref 70–99)
HCO3 BLD-SCNC: 20 MMOL/L (ref 21–28)
HCO3 BLD-SCNC: 21 MMOL/L (ref 21–28)
HCT VFR BLD AUTO: 34.5 % (ref 40–53)
HCT VFR BLD AUTO: 34.7 % (ref 40–53)
HCT VFR BLD AUTO: 35.2 % (ref 40–53)
HCT VFR BLD AUTO: 35.2 % (ref 40–53)
HCT VFR BLD AUTO: 35.5 % (ref 40–53)
HCT VFR BLD AUTO: 36.1 % (ref 40–53)
HGB BLD-MCNC: 11.4 G/DL (ref 13.3–17.7)
HGB BLD-MCNC: 11.4 G/DL (ref 13.3–17.7)
HGB BLD-MCNC: 11.5 G/DL (ref 13.3–17.7)
HGB BLD-MCNC: 11.7 G/DL (ref 13.3–17.7)
HGB BLD-MCNC: 11.7 G/DL (ref 13.3–17.7)
HGB BLD-MCNC: 11.8 G/DL (ref 13.3–17.7)
IMM GRANULOCYTES # BLD: 0.8 10E3/UL
IMM GRANULOCYTES NFR BLD: 4 %
INTERPRETATION ECG - MUSE: NORMAL
LYMPHOCYTES # BLD AUTO: 1 10E3/UL (ref 0.8–5.3)
LYMPHOCYTES # BLD MANUAL: 1.7 10E3/UL (ref 0.8–5.3)
LYMPHOCYTES # BLD MANUAL: 1.8 10E3/UL (ref 0.8–5.3)
LYMPHOCYTES # BLD MANUAL: 2.3 10E3/UL (ref 0.8–5.3)
LYMPHOCYTES NFR BLD AUTO: 5 %
LYMPHOCYTES NFR BLD MANUAL: 3 %
LYMPHOCYTES NFR BLD MANUAL: 3 %
LYMPHOCYTES NFR BLD MANUAL: 4 %
MAGNESIUM SERPL-MCNC: 2.3 MG/DL (ref 1.6–2.3)
MAGNESIUM SERPL-MCNC: 2.4 MG/DL (ref 1.6–2.3)
MAGNESIUM SERPL-MCNC: 2.5 MG/DL (ref 1.6–2.3)
MCH RBC QN AUTO: 33.2 PG (ref 26.5–33)
MCH RBC QN AUTO: 33.8 PG (ref 26.5–33)
MCH RBC QN AUTO: 33.9 PG (ref 26.5–33)
MCH RBC QN AUTO: 34.4 PG (ref 26.5–33)
MCHC RBC AUTO-ENTMCNC: 31.6 G/DL (ref 31.5–36.5)
MCHC RBC AUTO-ENTMCNC: 32.9 G/DL (ref 31.5–36.5)
MCHC RBC AUTO-ENTMCNC: 33.2 G/DL (ref 31.5–36.5)
MCHC RBC AUTO-ENTMCNC: 33.3 G/DL (ref 31.5–36.5)
MCV RBC AUTO: 102 FL (ref 78–100)
MCV RBC AUTO: 103 FL (ref 78–100)
MCV RBC AUTO: 104 FL (ref 78–100)
MCV RBC AUTO: 105 FL (ref 78–100)
METAMYELOCYTES # BLD MANUAL: 1.1 10E3/UL
METAMYELOCYTES # BLD MANUAL: 1.8 10E3/UL
METAMYELOCYTES # BLD MANUAL: 2.9 10E3/UL
METAMYELOCYTES NFR BLD MANUAL: 2 %
METAMYELOCYTES NFR BLD MANUAL: 3 %
METAMYELOCYTES NFR BLD MANUAL: 5 %
MONOCYTES # BLD AUTO: 1.2 10E3/UL (ref 0–1.3)
MONOCYTES # BLD MANUAL: 0 10E3/UL (ref 0–1.3)
MONOCYTES # BLD MANUAL: 0.6 10E3/UL (ref 0–1.3)
MONOCYTES # BLD MANUAL: 1.2 10E3/UL (ref 0–1.3)
MONOCYTES NFR BLD AUTO: 6 %
MONOCYTES NFR BLD MANUAL: 0 %
MONOCYTES NFR BLD MANUAL: 1 %
MONOCYTES NFR BLD MANUAL: 2 %
MYELOCYTES # BLD MANUAL: 0.6 10E3/UL
MYELOCYTES # BLD MANUAL: 0.6 10E3/UL
MYELOCYTES # BLD MANUAL: 2.3 10E3/UL
MYELOCYTES NFR BLD MANUAL: 1 %
MYELOCYTES NFR BLD MANUAL: 1 %
MYELOCYTES NFR BLD MANUAL: 4 %
NEUTROPHILS # BLD AUTO: 15.7 10E3/UL (ref 1.6–8.3)
NEUTROPHILS # BLD MANUAL: 49.9 10E3/UL (ref 1.6–8.3)
NEUTROPHILS # BLD MANUAL: 52.5 10E3/UL (ref 1.6–8.3)
NEUTROPHILS # BLD MANUAL: 54.6 10E3/UL (ref 1.6–8.3)
NEUTROPHILS NFR BLD AUTO: 84 %
NEUTROPHILS NFR BLD MANUAL: 86 %
NEUTROPHILS NFR BLD MANUAL: 91 %
NEUTROPHILS NFR BLD MANUAL: 94 %
NRBC # BLD AUTO: 0.4 10E3/UL
NRBC # BLD AUTO: 0.6 10E3/UL
NRBC # BLD AUTO: 1.2 10E3/UL
NRBC # BLD AUTO: 1.7 10E3/UL
NRBC BLD AUTO-RTO: 2 /100
NRBC BLD MANUAL-RTO: 1 %
NRBC BLD MANUAL-RTO: 2 %
NRBC BLD MANUAL-RTO: 3 %
O2/TOTAL GAS SETTING VFR VENT: 60 %
O2/TOTAL GAS SETTING VFR VENT: 60 %
P AXIS - MUSE: 65 DEGREES
PCO2 BLD: 40 MM HG (ref 35–45)
PCO2 BLD: 44 MM HG (ref 35–45)
PH BLD: 7.29 [PH] (ref 7.35–7.45)
PH BLD: 7.3 [PH] (ref 7.35–7.45)
PHOSPHATE SERPL-MCNC: 3.8 MG/DL (ref 2.5–4.5)
PHOSPHATE SERPL-MCNC: 3.9 MG/DL (ref 2.5–4.5)
PHOSPHATE SERPL-MCNC: 4 MG/DL (ref 2.5–4.5)
PLAT MORPH BLD: ABNORMAL
PLATELET # BLD AUTO: 113 10E3/UL (ref 150–450)
PLATELET # BLD AUTO: 193 10E3/UL (ref 150–450)
PLATELET # BLD AUTO: 87 10E3/UL (ref 150–450)
PLATELET # BLD AUTO: 88 10E3/UL (ref 150–450)
PLATELET # BLD AUTO: 88 10E3/UL (ref 150–450)
PLATELET # BLD AUTO: 95 10E3/UL (ref 150–450)
PO2 BLD: 83 MM HG (ref 80–105)
PO2 BLD: 99 MM HG (ref 80–105)
POTASSIUM BLD-SCNC: 4.1 MMOL/L (ref 3.4–5.3)
POTASSIUM BLD-SCNC: 4.2 MMOL/L (ref 3.4–5.3)
POTASSIUM BLD-SCNC: 4.4 MMOL/L (ref 3.4–5.3)
PR INTERVAL - MUSE: 168 MS
PROT SERPL-MCNC: 5.8 G/DL (ref 6.8–8.8)
QRS DURATION - MUSE: 102 MS
QT - MUSE: 574 MS
QTC - MUSE: 512 MS
R AXIS - MUSE: 44 DEGREES
RBC # BLD AUTO: 3.36 10E6/UL (ref 4.4–5.9)
RBC # BLD AUTO: 3.4 10E6/UL (ref 4.4–5.9)
RBC # BLD AUTO: 3.43 10E6/UL (ref 4.4–5.9)
RBC # BLD AUTO: 3.43 10E6/UL (ref 4.4–5.9)
RBC MORPH BLD: ABNORMAL
RETICS # AUTO: 0.06 10E6/UL (ref 0.03–0.1)
RETICS/RBC NFR AUTO: 1.7 % (ref 0.5–2)
SODIUM SERPL-SCNC: 133 MMOL/L (ref 133–144)
SODIUM SERPL-SCNC: 135 MMOL/L (ref 133–144)
SODIUM SERPL-SCNC: 136 MMOL/L (ref 133–144)
SYSTOLIC BLOOD PRESSURE - MUSE: NORMAL MMHG
T AXIS - MUSE: 60 DEGREES
UFH PPP CHRO-ACNC: <0.1 IU/ML
VANCOMYCIN SERPL-MCNC: 14.7 MG/L
VENTRICULAR RATE- MUSE: 48 BPM
WBC # BLD AUTO: 18.7 10E3/UL (ref 4–11)
WBC # BLD AUTO: 55.8 10E3/UL (ref 4–11)
WBC # BLD AUTO: 58 10E3/UL (ref 4–11)
WBC # BLD AUTO: 58 10E3/UL (ref 4–11)
WBC # BLD AUTO: 59.5 10E3/UL (ref 4–11)
WBC # BLD AUTO: 60 10E3/UL (ref 4–11)

## 2022-11-15 PROCEDURE — 82248 BILIRUBIN DIRECT: CPT | Performed by: INTERNAL MEDICINE

## 2022-11-15 PROCEDURE — 99291 CRITICAL CARE FIRST HOUR: CPT | Mod: FS | Performed by: PSYCHIATRY & NEUROLOGY

## 2022-11-15 PROCEDURE — 999N000157 HC STATISTIC RCP TIME EA 10 MIN

## 2022-11-15 PROCEDURE — 258N000003 HC RX IP 258 OP 636: Performed by: STUDENT IN AN ORGANIZED HEALTH CARE EDUCATION/TRAINING PROGRAM

## 2022-11-15 PROCEDURE — 200N000001 HC R&B ICU

## 2022-11-15 PROCEDURE — 250N000009 HC RX 250: Performed by: INTERNAL MEDICINE

## 2022-11-15 PROCEDURE — 250N000013 HC RX MED GY IP 250 OP 250 PS 637: Performed by: STUDENT IN AN ORGANIZED HEALTH CARE EDUCATION/TRAINING PROGRAM

## 2022-11-15 PROCEDURE — 250N000011 HC RX IP 250 OP 636: Performed by: STUDENT IN AN ORGANIZED HEALTH CARE EDUCATION/TRAINING PROGRAM

## 2022-11-15 PROCEDURE — 85014 HEMATOCRIT: CPT | Performed by: STUDENT IN AN ORGANIZED HEALTH CARE EDUCATION/TRAINING PROGRAM

## 2022-11-15 PROCEDURE — 83735 ASSAY OF MAGNESIUM: CPT | Performed by: STUDENT IN AN ORGANIZED HEALTH CARE EDUCATION/TRAINING PROGRAM

## 2022-11-15 PROCEDURE — 82330 ASSAY OF CALCIUM: CPT | Performed by: STUDENT IN AN ORGANIZED HEALTH CARE EDUCATION/TRAINING PROGRAM

## 2022-11-15 PROCEDURE — 94003 VENT MGMT INPAT SUBQ DAY: CPT

## 2022-11-15 PROCEDURE — 99291 CRITICAL CARE FIRST HOUR: CPT | Performed by: STUDENT IN AN ORGANIZED HEALTH CARE EDUCATION/TRAINING PROGRAM

## 2022-11-15 PROCEDURE — 85027 COMPLETE CBC AUTOMATED: CPT | Performed by: STUDENT IN AN ORGANIZED HEALTH CARE EDUCATION/TRAINING PROGRAM

## 2022-11-15 PROCEDURE — 85045 AUTOMATED RETICULOCYTE COUNT: CPT | Performed by: STUDENT IN AN ORGANIZED HEALTH CARE EDUCATION/TRAINING PROGRAM

## 2022-11-15 PROCEDURE — 999N000009 HC STATISTIC AIRWAY CARE

## 2022-11-15 PROCEDURE — 87205 SMEAR GRAM STAIN: CPT | Performed by: STUDENT IN AN ORGANIZED HEALTH CARE EDUCATION/TRAINING PROGRAM

## 2022-11-15 PROCEDURE — 94640 AIRWAY INHALATION TREATMENT: CPT | Mod: 76

## 2022-11-15 PROCEDURE — 999N000065 XR ABDOMEN PORT 1 VIEW

## 2022-11-15 PROCEDURE — 80053 COMPREHEN METABOLIC PANEL: CPT | Performed by: STUDENT IN AN ORGANIZED HEALTH CARE EDUCATION/TRAINING PROGRAM

## 2022-11-15 PROCEDURE — 85007 BL SMEAR W/DIFF WBC COUNT: CPT | Performed by: STUDENT IN AN ORGANIZED HEALTH CARE EDUCATION/TRAINING PROGRAM

## 2022-11-15 PROCEDURE — 99223 1ST HOSP IP/OBS HIGH 75: CPT | Performed by: SPECIALIST

## 2022-11-15 PROCEDURE — 85520 HEPARIN ASSAY: CPT | Performed by: STUDENT IN AN ORGANIZED HEALTH CARE EDUCATION/TRAINING PROGRAM

## 2022-11-15 PROCEDURE — 84100 ASSAY OF PHOSPHORUS: CPT | Performed by: STUDENT IN AN ORGANIZED HEALTH CARE EDUCATION/TRAINING PROGRAM

## 2022-11-15 PROCEDURE — 82803 BLOOD GASES ANY COMBINATION: CPT | Performed by: STUDENT IN AN ORGANIZED HEALTH CARE EDUCATION/TRAINING PROGRAM

## 2022-11-15 PROCEDURE — 99292 CRITICAL CARE ADDL 30 MIN: CPT | Mod: FS | Performed by: PSYCHIATRY & NEUROLOGY

## 2022-11-15 PROCEDURE — 74018 RADEX ABDOMEN 1 VIEW: CPT

## 2022-11-15 PROCEDURE — 99233 SBSQ HOSP IP/OBS HIGH 50: CPT | Performed by: STUDENT IN AN ORGANIZED HEALTH CARE EDUCATION/TRAINING PROGRAM

## 2022-11-15 PROCEDURE — 250N000011 HC RX IP 250 OP 636

## 2022-11-15 PROCEDURE — 94645 CONT INHLJ TX EACH ADDL HOUR: CPT

## 2022-11-15 PROCEDURE — 80202 ASSAY OF VANCOMYCIN: CPT | Performed by: STUDENT IN AN ORGANIZED HEALTH CARE EDUCATION/TRAINING PROGRAM

## 2022-11-15 PROCEDURE — 250N000009 HC RX 250: Performed by: STUDENT IN AN ORGANIZED HEALTH CARE EDUCATION/TRAINING PROGRAM

## 2022-11-15 PROCEDURE — 82803 BLOOD GASES ANY COMBINATION: CPT | Performed by: INTERNAL MEDICINE

## 2022-11-15 RX ORDER — ALBUTEROL SULFATE 0.83 MG/ML
2.5 SOLUTION RESPIRATORY (INHALATION)
Status: DISCONTINUED | OUTPATIENT
Start: 2022-11-15 | End: 2022-11-21

## 2022-11-15 RX ORDER — ALBUTEROL SULFATE 0.83 MG/ML
2.5 SOLUTION RESPIRATORY (INHALATION)
Status: DISCONTINUED | OUTPATIENT
Start: 2022-11-15 | End: 2022-12-15 | Stop reason: HOSPADM

## 2022-11-15 RX ORDER — CEFTRIAXONE 2 G/1
2 INJECTION, POWDER, FOR SOLUTION INTRAMUSCULAR; INTRAVENOUS EVERY 12 HOURS
Status: DISCONTINUED | OUTPATIENT
Start: 2022-11-15 | End: 2022-11-18

## 2022-11-15 RX ORDER — VANCOMYCIN HYDROCHLORIDE 1 G/200ML
1000 INJECTION, SOLUTION INTRAVENOUS ONCE
Status: COMPLETED | OUTPATIENT
Start: 2022-11-15 | End: 2022-11-15

## 2022-11-15 RX ADMIN — PROPOFOL 60 MCG/KG/MIN: 10 INJECTION, EMULSION INTRAVENOUS at 22:39

## 2022-11-15 RX ADMIN — PROPOFOL 60 MCG/KG/MIN: 10 INJECTION, EMULSION INTRAVENOUS at 15:35

## 2022-11-15 RX ADMIN — CEFTRIAXONE SODIUM 2 G: 2 INJECTION, POWDER, FOR SOLUTION INTRAMUSCULAR; INTRAVENOUS at 15:41

## 2022-11-15 RX ADMIN — Medication 40 MG: at 06:48

## 2022-11-15 RX ADMIN — CALCIUM CHLORIDE, MAGNESIUM CHLORIDE, SODIUM CHLORIDE, SODIUM BICARBONATE, POTASSIUM CHLORIDE AND SODIUM PHOSPHATE DIBASIC DIHYDRATE 5000 ML: 3.68; 3.05; 6.34; 3.09; .314; .187 INJECTION INTRAVENOUS at 23:49

## 2022-11-15 RX ADMIN — PROPOFOL 50 MCG/KG/MIN: 10 INJECTION, EMULSION INTRAVENOUS at 04:51

## 2022-11-15 RX ADMIN — ALBUTEROL SULFATE 2.5 MG: 2.5 SOLUTION RESPIRATORY (INHALATION) at 12:24

## 2022-11-15 RX ADMIN — CALCIUM CHLORIDE, MAGNESIUM CHLORIDE, DEXTROSE MONOHYDRATE, LACTIC ACID, SODIUM CHLORIDE, SODIUM BICARBONATE AND POTASSIUM CHLORIDE 5000 ML: 5.15; 2.03; 22; 5.4; 6.46; 3.09; .157 INJECTION INTRAVENOUS at 08:23

## 2022-11-15 RX ADMIN — CALCIUM CHLORIDE, MAGNESIUM CHLORIDE, SODIUM CHLORIDE, SODIUM BICARBONATE, POTASSIUM CHLORIDE AND SODIUM PHOSPHATE DIBASIC DIHYDRATE 5000 ML: 3.68; 3.05; 6.34; 3.09; .314; .187 INJECTION INTRAVENOUS at 21:01

## 2022-11-15 RX ADMIN — VASOPRESSIN 2.4 UNITS/HR: 20 INJECTION INTRAVENOUS at 05:47

## 2022-11-15 RX ADMIN — METHYLPREDNISOLONE SODIUM SUCCINATE 62.5 MG: 125 INJECTION, POWDER, FOR SOLUTION INTRAMUSCULAR; INTRAVENOUS at 04:10

## 2022-11-15 RX ADMIN — CALCIUM CHLORIDE, MAGNESIUM CHLORIDE, SODIUM CHLORIDE, SODIUM BICARBONATE, POTASSIUM CHLORIDE AND SODIUM PHOSPHATE DIBASIC DIHYDRATE 5000 ML: 3.68; 3.05; 6.34; 3.09; .314; .187 INJECTION INTRAVENOUS at 05:40

## 2022-11-15 RX ADMIN — MINERAL OIL, PETROLATUM: 425; 573 OINTMENT OPHTHALMIC at 07:45

## 2022-11-15 RX ADMIN — Medication 15 ML: at 09:10

## 2022-11-15 RX ADMIN — EPOPROSTENOL 20 NG/KG/MIN: 1.5 INJECTION, POWDER, LYOPHILIZED, FOR SOLUTION INTRAVENOUS at 02:32

## 2022-11-15 RX ADMIN — EPOPROSTENOL 20 NG/KG/MIN: 1.5 INJECTION, POWDER, LYOPHILIZED, FOR SOLUTION INTRAVENOUS at 13:44

## 2022-11-15 RX ADMIN — CALCIUM CHLORIDE, MAGNESIUM CHLORIDE, SODIUM CHLORIDE, SODIUM BICARBONATE, POTASSIUM CHLORIDE AND SODIUM PHOSPHATE DIBASIC DIHYDRATE 5000 ML: 3.68; 3.05; 6.34; 3.09; .314; .187 INJECTION INTRAVENOUS at 18:48

## 2022-11-15 RX ADMIN — VASOPRESSIN 2.4 UNITS/HR: 20 INJECTION INTRAVENOUS at 14:43

## 2022-11-15 RX ADMIN — ALBUTEROL SULFATE 2.5 MG: 2.5 SOLUTION RESPIRATORY (INHALATION) at 19:59

## 2022-11-15 RX ADMIN — METHYLPREDNISOLONE SODIUM SUCCINATE 62.5 MG: 125 INJECTION, POWDER, FOR SOLUTION INTRAMUSCULAR; INTRAVENOUS at 21:43

## 2022-11-15 RX ADMIN — PROPOFOL 60 MCG/KG/MIN: 10 INJECTION, EMULSION INTRAVENOUS at 20:02

## 2022-11-15 RX ADMIN — CALCIUM CHLORIDE, MAGNESIUM CHLORIDE, SODIUM CHLORIDE, SODIUM BICARBONATE, POTASSIUM CHLORIDE AND SODIUM PHOSPHATE DIBASIC DIHYDRATE 5000 ML: 3.68; 3.05; 6.34; 3.09; .314; .187 INJECTION INTRAVENOUS at 09:55

## 2022-11-15 RX ADMIN — CALCIUM CHLORIDE, MAGNESIUM CHLORIDE, SODIUM CHLORIDE, SODIUM BICARBONATE, POTASSIUM CHLORIDE AND SODIUM PHOSPHATE DIBASIC DIHYDRATE 5000 ML: 3.68; 3.05; 6.34; 3.09; .314; .187 INJECTION INTRAVENOUS at 01:11

## 2022-11-15 RX ADMIN — PIPERACILLIN AND TAZOBACTAM 4.5 G: 4; .5 INJECTION, POWDER, FOR SOLUTION INTRAVENOUS at 09:53

## 2022-11-15 RX ADMIN — CALCIUM CHLORIDE, MAGNESIUM CHLORIDE, SODIUM CHLORIDE, SODIUM BICARBONATE, POTASSIUM CHLORIDE AND SODIUM PHOSPHATE DIBASIC DIHYDRATE 5000 ML: 3.68; 3.05; 6.34; 3.09; .314; .187 INJECTION INTRAVENOUS at 16:30

## 2022-11-15 RX ADMIN — EPOPROSTENOL 20 NG/KG/MIN: 1.5 INJECTION, POWDER, LYOPHILIZED, FOR SOLUTION INTRAVENOUS at 18:02

## 2022-11-15 RX ADMIN — METHYLPREDNISOLONE SODIUM SUCCINATE 62.5 MG: 125 INJECTION, POWDER, FOR SOLUTION INTRAMUSCULAR; INTRAVENOUS at 15:41

## 2022-11-15 RX ADMIN — METHYLPREDNISOLONE SODIUM SUCCINATE 62.5 MG: 125 INJECTION, POWDER, FOR SOLUTION INTRAMUSCULAR; INTRAVENOUS at 09:53

## 2022-11-15 RX ADMIN — CALCIUM CHLORIDE, MAGNESIUM CHLORIDE, DEXTROSE MONOHYDRATE, LACTIC ACID, SODIUM CHLORIDE, SODIUM BICARBONATE AND POTASSIUM CHLORIDE 5000 ML: 5.15; 2.03; 22; 5.4; 6.46; 3.09; .157 INJECTION INTRAVENOUS at 13:25

## 2022-11-15 RX ADMIN — CALCIUM CHLORIDE, MAGNESIUM CHLORIDE, DEXTROSE MONOHYDRATE, LACTIC ACID, SODIUM CHLORIDE, SODIUM BICARBONATE AND POTASSIUM CHLORIDE 5000 ML: 5.15; 2.03; 22; 5.4; 6.46; 3.09; .157 INJECTION INTRAVENOUS at 03:14

## 2022-11-15 RX ADMIN — PROPOFOL 60 MCG/KG/MIN: 10 INJECTION, EMULSION INTRAVENOUS at 17:19

## 2022-11-15 RX ADMIN — PIPERACILLIN AND TAZOBACTAM 4.5 G: 4; .5 INJECTION, POWDER, FOR SOLUTION INTRAVENOUS at 04:09

## 2022-11-15 RX ADMIN — CHLORHEXIDINE GLUCONATE 0.12% ORAL RINSE 15 ML: 1.2 LIQUID ORAL at 19:25

## 2022-11-15 RX ADMIN — PROPOFOL 50 MCG/KG/MIN: 10 INJECTION, EMULSION INTRAVENOUS at 01:22

## 2022-11-15 RX ADMIN — CHLORHEXIDINE GLUCONATE 0.12% ORAL RINSE 15 ML: 1.2 LIQUID ORAL at 07:30

## 2022-11-15 RX ADMIN — MINERAL OIL, PETROLATUM: 425; 573 OINTMENT OPHTHALMIC at 15:42

## 2022-11-15 RX ADMIN — EPOPROSTENOL 20 NG/KG/MIN: 1.5 INJECTION, POWDER, LYOPHILIZED, FOR SOLUTION INTRAVENOUS at 23:26

## 2022-11-15 RX ADMIN — CALCIUM CHLORIDE, MAGNESIUM CHLORIDE, SODIUM CHLORIDE, SODIUM BICARBONATE, POTASSIUM CHLORIDE AND SODIUM PHOSPHATE DIBASIC DIHYDRATE 5000 ML: 3.68; 3.05; 6.34; 3.09; .314; .187 INJECTION INTRAVENOUS at 12:16

## 2022-11-15 RX ADMIN — CALCIUM CHLORIDE, MAGNESIUM CHLORIDE, DEXTROSE MONOHYDRATE, LACTIC ACID, SODIUM CHLORIDE, SODIUM BICARBONATE AND POTASSIUM CHLORIDE 5000 ML: 5.15; 2.03; 22; 5.4; 6.46; 3.09; .157 INJECTION INTRAVENOUS at 18:47

## 2022-11-15 RX ADMIN — EPOPROSTENOL 20 NG/KG/MIN: 1.5 INJECTION, POWDER, LYOPHILIZED, FOR SOLUTION INTRAVENOUS at 07:34

## 2022-11-15 RX ADMIN — VANCOMYCIN HYDROCHLORIDE 1000 MG: 1 INJECTION, SOLUTION INTRAVENOUS at 08:38

## 2022-11-15 RX ADMIN — PROPOFOL 50 MCG/KG/MIN: 10 INJECTION, EMULSION INTRAVENOUS at 07:30

## 2022-11-15 RX ADMIN — CALCIUM CHLORIDE, MAGNESIUM CHLORIDE, SODIUM CHLORIDE, SODIUM BICARBONATE, POTASSIUM CHLORIDE AND SODIUM PHOSPHATE DIBASIC DIHYDRATE 5000 ML: 3.68; 3.05; 6.34; 3.09; .314; .187 INJECTION INTRAVENOUS at 03:27

## 2022-11-15 RX ADMIN — CALCIUM CHLORIDE, MAGNESIUM CHLORIDE, SODIUM CHLORIDE, SODIUM BICARBONATE, POTASSIUM CHLORIDE AND SODIUM PHOSPHATE DIBASIC DIHYDRATE 5000 ML: 3.68; 3.05; 6.34; 3.09; .314; .187 INJECTION INTRAVENOUS at 07:47

## 2022-11-15 RX ADMIN — PROPOFOL 50 MCG/KG/MIN: 10 INJECTION, EMULSION INTRAVENOUS at 10:41

## 2022-11-15 RX ADMIN — Medication 0.29 MCG/KG/MIN: at 02:13

## 2022-11-15 RX ADMIN — MINERAL OIL, PETROLATUM: 425; 573 OINTMENT OPHTHALMIC at 23:49

## 2022-11-15 RX ADMIN — Medication 0.24 MCG/KG/MIN: at 11:08

## 2022-11-15 RX ADMIN — CALCIUM CHLORIDE, MAGNESIUM CHLORIDE, DEXTROSE MONOHYDRATE, LACTIC ACID, SODIUM CHLORIDE, SODIUM BICARBONATE AND POTASSIUM CHLORIDE: 5.15; 2.03; 22; 5.4; 6.46; 3.09; .157 INJECTION INTRAVENOUS at 13:15

## 2022-11-15 ASSESSMENT — ACTIVITIES OF DAILY LIVING (ADL)
ADLS_ACUITY_SCORE: 51
ADLS_ACUITY_SCORE: 47
ADLS_ACUITY_SCORE: 51
ADLS_ACUITY_SCORE: 47

## 2022-11-15 NOTE — PROGRESS NOTES
ICU Progress Note    Date of Service: 11/15/22    A/P:  57M cirrhosis, CARRILLO s/p liver txp (9/2016) admitted to ICU 11/12/22 after out of hospital PEA arrest and acute hypoxemic respiratory failure. Course c/b LORETTA requiring CRRT. Found to have Parainfluenza virus and Streptococcal bacteremia.      I have personally reviewed the daily labs, imaging studies, cultures and discussed the case with referring physician and consulting physicians.      Neuro  # Sedation for mechanical ventilation  # Post-arrest anoxic brain injury  - propofol gtt  - fentanyl IVP prn   - plan to repeat NCHCT tomorrow pending clinical stability   - neurocritical care c/s      Pulmonary  # Acute hypoxemic respiratory failure  # ARDS  # Bilateral pleural effusions  - vent settings below  - prone today  - methylprednisolone 62.5mg q6h  - albuterol q4h   - consider thoracentesis pending clinical course     Cardiac  # Out of hospital PEA arrest  # Cardiogenic shock  # Septic shock  # Therapeutic temperature management - rewarmed 11/14  - MAP > 65  - norepinephrine gtt  - vasopressin gtt     Renal  # LORETTA - in the setting of PEA arrest and shock state  - monitor UOP, Cr, I/O  - CRRT  - nephrology c/s       GI  # Protein calorie malnutrition   # CARRILLO s/p liver txp - 9/2016  - TF managed by RD   - holding tacrolimus, on admission level 3.6     Heme/Onc  # Leukocytosis - 2/2 sepsis, profound increase 11/14, afebrile, however on CRRT  - monitor CBC  - add on differential to AM labs  - peripheral smear      ID  # Streptococcal bacteremia  # Parainfluenza virus   - pip-tazo and vanc  - monitor Cx data  - ID c/s      Endo  # Stress induced hyperglycemia   - monitor BG     PPX  1. DVT: SQH    2. VAP: HOB 30 degrees, chlorhexidine rinse  3. Stress Ulcer: PPI  4. Restraints: Nonviolent soft two point restraints required and necessary for patient safety and continued cares and good effect as patient continues to pull at necessary lines, tubes despite education  and distraction. Will readdress daily.   5. Wound care - per unit routine   6. Feeding - TF  7. Family updated.    Interval Hx:  NAEON. P:F 138. Pplat 28.    Unable to obtain ROS 2/2 sedation/intubation.     /62 (BP Location: Left arm)   Pulse 63   Temp 98.1  F (36.7  C)   Resp 17   Ht 1.829 m (6')   Wt 105.5 kg (232 lb 9.4 oz)   SpO2 97%   BMI 31.54 kg/m    Gen: supine, NAD  Neuro: sedated, pupils equal  HEENT: anicteric  Card: RRR  Pulm: clear b/l  Abd: soft, non-distended  MSK: no edema, no acute joint abnormality  Skin: no obvious rash    Vent Mode: CMV/AC  (Continuous Mandatory Ventilation/ Assist Control)  FiO2 (%): 60 %  Resp Rate (Set): 24 breaths/min  Tidal Volume (Set, mL): 400 mL  PEEP (cm H2O): 16 cmH2O  Resp: 17        Intake/Output Summary (Last 24 hours) at 11/15/2022 1020  Last data filed at 11/15/2022 1000  Gross per 24 hour   Intake 3541.48 ml   Output 2657 ml   Net 884.48 ml       Labs: reviewed    Imaging: reviewed    Billing: Patient is critically ill. Total critical care time today, excluding procedures, was 45 minutes.    Ki Watts MD  Pulmonary Disease and Critical Care Medicine   [facility]

## 2022-11-15 NOTE — PROGRESS NOTES
"[facility]    Neurocritical care progress Note    Interval EventsDifficulties in weaning propofol and maintaining RASS. Attempted to wean propofol down from 50 mcg/kg/min to 5. Neuro exam attempted but not responsive (see exam below).     HPI Summary  Blanco Osbonre is a 56 yo Male with pertinent past medical history of nonalcoholic fatty liver disease/cirrhosis s/p transplant 09/2016.    He presented to the emergency department 11/12/2022 following cardiac arrest after calling EMS himself due to shortness of breath and weakness.  He had been complaining to family of fever, fatigue, cough, confusion x1 week prior (tested positive on admission for parainfluenza).  On EMS arrival he was found in PEA s/p epi and bicarb achieved ROSC.  CTh unremarkable.  EEG with severe diffuse nonspecific encephalopathy. Hypothermia protocol initiated.  Began rewarming 11/13/2022.  Achieved normothermia 11/14/2022.     Evaluation Summarized  CT head 11/12: no evidence of cerebral edema or loss of gray-white differentiation   EEG: No epileptiform discharges or electrographic seizures were recorded; Findings are consistent with severe diffuse nonspecific encephalopathy      Impression  Cardiac arrest, s/p hypothermic protocol, rewarming to normothermia 11/14/2022, concern of possible underlying anoxic brain injury     Plan  -Q2 hour neurochecks  -attempt to wean sedation as able to medically tolerate  -plan to repeat CTH tomorrow morning for stability  -STAT head CT for change in neurological excam  - We will plan for MRI brain with and without contrast 72 hours post rewarming (11/17/2022) to assess for anoxic brain injury  -euthermia, eunatremia, euglycemia    Patient Follow-up    - final recommendation pending work-up    We will continue to follow.     Annabelle Solorzano PA-C  Vascular Neurology    To page me or covering stroke neurology team member, click here: AMCOM   Choose \"On Call\" tab at top, then " "search dropdown box for \"Neurology Adult\", select location, press Enter, then look for stroke/neuro ICU/telestroke.  ______________________________________________________    Clinically Significant Risk Factors Present on Admission     Medications   Scheduled Meds    albuterol  6 puff Inhalation Q4H     artificial tears   Both Eyes Q8H     chlorhexidine  15 mL Mouth/Throat Q12H     heparin  1.3-2.6 mL Intracatheter Once in dialysis/CRRT     heparin  1.3-2.6 mL Intracatheter Once in dialysis/CRRT     methylPREDNISolone  62.5 mg Intravenous Q6H     multivitamins w/minerals  15 mL Per Feeding Tube Daily     pantoprazole  40 mg Per Feeding Tube QAM AC    Or     pantoprazole  40 mg Intravenous QAM AC     piperacillin-tazobactam  4.5 g Intravenous Q6H     vancomycin place richards - receiving intermittent dosing  1 each Intravenous See Admin Instructions       Infusion Meds    dextrose       CRRT replacement solution 0 mL/hr (11/14/22 1030)     epoprostenol 20 ng/kg/min (11/15/22 0734)     heparin 500 Units/hr (11/15/22 0734)     - MEDICATION INSTRUCTIONS -       - MEDICATION INSTRUCTIONS -       norepinephrine 0.24 mcg/kg/min (11/15/22 0734)     CRRT replacement solution 200 mL/hr at 11/14/22 1124     CRRT replacement solution 0 mL/kg/hr (11/14/22 1030)     propofol 50 mcg/kg/min (11/15/22 0734)     sodium chloride 20 mL/hr at 11/14/22 0725     vasopressin 2.4 Units/hr (11/15/22 0734)       PRN Meds  sodium chloride 0.9%, calcium gluconate, calcium gluconate, dextrose, glucose **OR** dextrose **OR** glucagon, fentaNYL, fentaNYL, magnesium sulfate, - MEDICATION INSTRUCTIONS -, - MEDICATION INSTRUCTIONS -, metoprolol, naloxone **OR** naloxone **OR** naloxone **OR** naloxone, ondansetron **OR** ondansetron, potassium chloride, prochlorperazine **OR** prochlorperazine **OR** prochlorperazine, senna-docusate **OR** senna-docusate, sodium phosphate, vecuronium, vecuronium       PHYSICAL EXAMINATION  Temp:  [97  F (36.1  C)-98.8 "  F (37.1  C)] 98.1  F (36.7  C)  Pulse:  [61-76] 63  Resp:  [0-32] 17  BP: (102-131)/(50-71) 116/62  MAP:  [58 mmHg-80 mmHg] 71 mmHg  Arterial Line BP: ()/(42-62) 101/53  FiO2 (%):  [60 %-70 %] 60 %  SpO2:  [91 %-99 %] 97 %      General Exam  General:  Lying in bed, intubated, sedated  HEENT:  normocephalic/atraumatic, tubing in place  Pulmonary:  no respiratory distress on mechanical ventilation    Neuro Exam  Mental Status: not alert, no verbal response, no eye opening even to deep noxious   Cranial Nerves: PER sluggishly RL, no blinking to threat, does not grimace to noxious or follow any commands, face grossly symmetric with ET tube in place  Motor: no movement to b/l UE with noxious, some flexion to RLE to noxious, no movement to LLE with noxious  Reflexes:  toes down-going  Sensory:  See motor  Coordination:  Not able to assess  Station/Gait:  Not able to assess    Imaging  I personally reviewed all imaging; relevant findings per HPI.     Lab Results Data   CBC  Recent Labs   Lab 11/15/22  0137 11/14/22  1813 11/14/22  1141   WBC 59.5* 58.5* 56.0*   RBC 3.43* 3.51* 3.41*   HGB 11.8* 12.0* 11.8*   HCT 35.5* 36.5* 35.8*   * 140* 136*     Basic Metabolic Panel    Recent Labs   Lab 11/15/22  0833 11/15/22  0137 11/14/22  1813 11/14/22  1603 11/14/22  1020   NA  --  136 135  --  136   POTASSIUM  --  4.4 4.4  --  4.5   CHLORIDE  --  103 104  --  104   CO2  --  19* 22  --  20   BUN  --  39* 44*  --  52*   CR  --  1.90* 2.35*  --  2.79*   * 120* 117*   < > 136*   KELLY  --  8.3* 8.3*  --  8.0*    < > = values in this interval not displayed.     Liver Panel  Recent Labs   Lab 11/15/22  0503 11/15/22  0137 11/14/22  1813 11/14/22  1020 11/14/22  0201 11/13/22  1701 11/13/22  0828 11/12/22  1502   PROTTOTAL 5.8*  --   --   --   --   --  6.0* 7.0   ALBUMIN 2.0* 2.0* 2.0*   < > 2.2*   < > 2.0* 2.3*   BILITOTAL 3.0*  --   --   --   --   --  2.8* 1.9*   ALKPHOS 172*  --   --   --  184*  --  127 187*   AST  62*  --   --   --   --   --  80* 86*   ALT 33  --   --   --   --   --  36 34    < > = values in this interval not displayed.     INR    Recent Labs   Lab Test 11/14/22  1020 11/14/22  0201 11/13/22  1701   INR 1.50* 1.47* 1.79*      Lipid Profile    Recent Labs   Lab Test 04/20/20  1157 08/01/19  1500 01/08/19  0840 10/31/17  1037 03/01/16  0648   CHOL 161  --   --  134 199   HDL 42  --   --  57 72   LDL 81  --   --  58 111*   TRIG 192* 177* 135 92 82     A1C    Recent Labs   Lab Test 09/22/16  0741   A1C 4.8     Troponin    Recent Labs   Lab 11/12/22  1147   TROPONINIS 10          Data   I personally examined and evaluated the patient today. At the time of my evaluation and management the patient was in critical condition today due to cardiac arrest s/p hypothermia protocol and rewarming with concern of possible anoxic brain injury. I personally managed review of chart, medical record, meds, imaging, history, exam, and discussion with attending regarding plan and documentation. I spent a total of 70 minutes providing critical care services, evaluating the patient, directing care and reviewing laboratory values and radiologic reports.

## 2022-11-15 NOTE — PHARMACY-VANCOMYCIN DOSING SERVICE
Pharmacy Vancomycin Note  Date of Service November 15, 2022  Patient's  1965   57 year old, male    Indication: Sepsis  Day of Therapy: 4  Current vancomycin regimen:  Intermittent dosing based on levels   Current vancomycin monitoring method: CRRT trough level monitoring   Current vancomycin therapeutic monitoring goal: 15-20 mg/L    Current estimated CrCl = Estimated Creatinine Clearance: 53.2 mL/min (A) (based on SCr of 1.9 mg/dL (H)).    Creatinine for last 3 days  2022: 11:47 AM Creatinine 5.06 mg/dL;  3:02 PM Creatinine 4.68 mg/dL  2022:  8:28 AM Creatinine 4.74 mg/dL;  5:01 PM Creatinine 4.44 mg/dL  2022:  2:01 AM Creatinine 3.50 mg/dL; 10:20 AM Creatinine 2.79 mg/dL;  6:13 PM Creatinine 2.35 mg/dL  11/15/2022:  1:37 AM Creatinine 1.90 mg/dL    Recent Vancomycin Levels (past 3 days)  2022:  5:16 AM Vancomycin 23.0 mg/L  2022:  5:51 AM Vancomycin 19.8 mg/L; 10:20 AM Vancomycin 18.1 mg/L  11/15/2022:  5:03 AM Vancomycin 14.7 mg/L    Vancomycin IV Administrations (past 72 hours)                   vancomycin (VANCOCIN) 500 mg vial to attach to  mL bag (mg) 500 mg New Bag 22 1417    vancomycin (VANCOCIN) 500 mg vial to attach to  mL bag (mg) 500 mg New Bag 22 1234    vancomycin (VANCOCIN) 2,500 mg in 0.9% NaCl 500 mL intermittent infusion (mg) 2,500 mg New Bag 22 1252                Nephrotoxins and other renal medications (From now, onward)    Start     Dose/Rate Route Frequency Ordered Stop    11/15/22 0830  vancomycin (VANCOCIN) 1000 mg in dextrose 5% 200 mL PREMIX         1,000 mg  200 mL/hr over 1 Hours Intravenous ONCE 11/15/22 0825      22 0900  norepinephrine (LEVOPHED) 16 mg in  mL infusion MAX CONC CENTRAL LINE         0.01-0.6 mcg/kg/min × 101 kg  0.9-56.8 mL/hr  Intravenous CONTINUOUS 22 0831      22 2200  piperacillin-tazobactam (ZOSYN) 4.5 g vial to attach to  mL bag        Note to Pharmacy: For [facility]  "and [facility]: For Zosyn-naive patients, use the \"Zosyn initial dose + extended infusion\" order panel.    4.5 g  over 30 Minutes Intravenous EVERY 6 HOURS 11/13/22 1744      11/12/22 1530  vasopressin 0.2 units/mL in NS (PITRESSIN) standard conc infusion         2.4 Units/hr  12 mL/hr  Intravenous CONTINUOUS 11/12/22 1526      11/12/22 1457  vancomycin place richards - receiving intermittent dosing         1 each Intravenous SEE ADMIN INSTRUCTIONS 11/12/22 1457               Contrast Orders - past 72 hours (72h ago, onward)    None          Interpretation of levels and current regimen:  Vancomycin level is reflective of subtherapeutic level (level drawn ~15 hrs post dose)     Has serum creatinine changed greater than 50% in last 72 hours: No    Urine output:  diminished urine output    Renal Function: Improving      Plan:  1. Give 1000 mg IV X once   2. Vancomycin monitoring method: Renal Replacement Therapy trough monitoring   3. Vancomycin therapeutic monitoring goal: 15-20 mg/L  4. Pharmacy will check vancomycin levels as appropriate with AM labs tomorrow .  5. Serum creatinine levels will be ordered daily for the first week of therapy and at least twice weekly for subsequent weeks.    Columba Said, RPH  "

## 2022-11-15 NOTE — PROGRESS NOTES
[facility] ICU RESPIRATORY NOTE        Date of Admission: 11/12/2022    Date of Intubation (most recent):11/12/22    Reason for Mechanical Ventilation:Respiratory failure    Number of Days on Mechanical Ventilation:4    Met Criteria for Spontaneous Breathing Trial:No    Reason for No Spontaneous Breathing Trial:Pt is on PEEP 16, Full strength Veletri and pt is in the prone position.    Significant Events Today:Pt was proned at 1430. Pt tolerated the proning well.    ABG Results:   Recent Labs   Lab 11/15/22  0835 11/14/22  0551 11/13/22  0827 11/12/22 2011   PH 7.29* 7.20* 7.24* 7.21*   PCO2 44 53* 46* 52*   PO2 83 74* 92 72*   HCO3 21 21 20* 21   O2PER 60 80 75 80       Current Vent Settings: Vent Mode: CMV/AC  (Continuous Mandatory Ventilation/ Assist Control)  FiO2 (%): 60 %  Resp Rate (Set): 24 breaths/min  Tidal Volume (Set, mL): 400 mL  PEEP (cm H2O): 16 cmH2O  Resp: 20      Skin Assessment:ETAD was switched out this morning due to ETAD being too tight with no clearance. Cavilon was placed. Skin was intact at this time with ETAD clearance. Around 1420 ETAD was switched to ETAD slim due to pt going to be proned. A small amount of was looked to be like crusted secretions above lip was wiped off by bedside RN. It was then noted that there was a skin tear above the upper lip going up to under his nose. Cavilon and opti foam placed. Bedside RN and MD aware. Wound consult placed by MD.    Plan:Will cont full vent support for now with Q2H head turns.    Kelly Kim, RT on 11/15/2022 at 2:36 PM

## 2022-11-15 NOTE — PROGRESS NOTES
Renal Medicine Progress Note            Assessment/Plan:     Blanco Osborne is a 57-year-old male with PMH CARRILLO s/p liver transplant (9/2016) and cirrhosis admitted 11/12/2022 with out of hospital PEA arrest and acute hypoxemic respiratory failure.  Course complicated by parainfluenza virus, streptococcal bacteremia, and LORETTA requiring CRRT.     Anuric LORETTA  Due to ATN in setting of PEA arrest and persistent shock.  Urine output negligible. CRRT started for anuric ATN.   -CRRT  ----Volume goal net even, remains on pressors will keep net even for now   ---- BRENDA sandy   ---- clotting issues resolved with heparin  - heparin fixed rate  -Every 8 hours CRRT labs  -Renally dose meds    PEA arrest  Septic shock  Cardiogenic shock  Currently on Levophed and vasopressin.  Rewarmed 11/14. Concern for anoxic brain injury. Neuro crit assessing.     Lactic acidosis, resolved  Improved with CRRT.    ESLD due to CARRILLO s/p liver transplant   - hold tacrolimus  -Tac trough 3.2    Hypoxic respiratory failure  ARDS  Vent management per ICU.  High FiO2 requirements with net positive fluid balance despite CRRT.  Attempting net even on CRRT if able.    FEN  Electrolytes improved with CRRT. Appears euvolemic.       Dwayne Laboy MD   [facility]  Office: [phone]          Interval History:     - no filter clotting after start of heparin   - levo requirement slightly improved, remains on vaso   - stable vent settings   - no UOP, gomez removed   - plan for proning today          Medications and Allergies:       albuterol  2.5 mg Nebulization Q6H     artificial tears   Both Eyes Q8H     cefTRIAXone  2 g Intravenous Q12H     chlorhexidine  15 mL Mouth/Throat Q12H     heparin  1.3-2.6 mL Intracatheter Once in dialysis/CRRT     heparin  1.3-2.6 mL Intracatheter Once in dialysis/CRRT     methylPREDNISolone  62.5 mg Intravenous Q6H     multivitamins w/minerals  15 mL Per Feeding Tube Daily     pantoprazole  40 mg Per Feeding  Tube QAM AC    Or     pantoprazole  40 mg Intravenous QAM AC      No Known Allergies         Physical Exam:   Vitals were reviewed  /63 (BP Location: Left arm)   Pulse 61   Temp (!) 95.7  F (35.4  C)   Resp 24   Ht 1.829 m (6')   Wt 105.5 kg (232 lb 9.4 oz)   SpO2 97%   BMI 31.54 kg/m      Wt Readings from Last 3 Encounters:   11/15/22 105.5 kg (232 lb 9.4 oz)   10/07/19 137.5 kg (303 lb 3.2 oz)   10/04/19 131.5 kg (290 lb)       Intake/Output Summary (Last 24 hours) at 11/14/2022 1136  Last data filed at 11/14/2022 1100  Gross per 24 hour   Intake 4097.12 ml   Output 2345 ml   Net 1752.12 ml       GENERAL APPEARANCE: intubated, sedated   HEENT:  ETT in place, slight rust color in tube  RESP: coarse bilaterally   CV: RRR  ABDOMEN: soft, nontender, nondistended   EXTREMITIES/SKIN: no LE edema  NEURO:  sedated  LINES:  + gomez, right internal jugular dialysis catheter           Data:     BMP  Recent Labs   Lab 11/15/22  1130 11/15/22  1007 11/15/22  0833 11/15/22  0137 11/14/22  1813 11/14/22  1603 11/14/22  1020   NA  --  135  --  136 135  --  136   POTASSIUM  --  4.2  --  4.4 4.4  --  4.5   CHLORIDE  --  102  --  103 104  --  104   KELLY  --  8.3*  --  8.3* 8.3*  --  8.0*   CO2  --  19*  --  19* 22  --  20   BUN  --  35*  --  39* 44*  --  52*   CR  --  1.60*  --  1.90* 2.35*  --  2.79*   * 177* 136* 120* 117*   < > 136*    < > = values in this interval not displayed.     CBC  Recent Labs   Lab 11/15/22  1132 11/15/22  1007 11/15/22  0137 11/14/22  1813   WBC 60.0* 58.0*  58.0* 59.5* 58.5*   HGB 11.4* 11.7*  11.7* 11.8* 12.0*   HCT 34.7* 35.2*  35.2* 35.5* 36.5*   * 104*  104* 104* 104*   PLT 95* 88*  88* 113* 140*     Lab Results   Component Value Date    AST 62 (H) 11/15/2022    ALT 33 11/15/2022    ALKPHOS 172 (H) 11/15/2022    BILITOTAL 3.0 (H) 11/15/2022     Lab Results   Component Value Date    INR 1.50 (H) 11/14/2022       Attestation:  I have reviewed today's vital signs, notes,  medications, labs and imaging.    Dwayne Laboy MD  [facility] - Nephrology  Office: [phone]

## 2022-11-15 NOTE — PROGRESS NOTES
[facility] ICU RESPIRATORY NOTE        Date of Admission: 11/12/2022    Date of Intubation (most recent): 11/12/2022    Reason for Mechanical Ventilation: Respiratory failure.    Number of Days on Mechanical Ventilation: 4    Met Criteria for Spontaneous Breathing Trial: no.    Reason for No Spontaneous Breathing Trial: The patient is on high PEEP.    Significant Events Today: None.    ABG Results:   Recent Labs   Lab 11/14/22  0551 11/13/22  0827 11/12/22 2011 11/12/22  1656   PH 7.20* 7.24* 7.21* 7.13*   PCO2 53* 46* 52* 62*   PO2 74* 92 72* 107*   HCO3 21 20* 21 20*   O2PER 80 75 80 100       Current Vent Settings: Vent Mode: CMV/AC  (Continuous Mandatory Ventilation/ Assist Control)  FiO2 (%): 70 %  Resp Rate (Set): 24 breaths/min  Tidal Volume (Set, mL): 400 mL  PEEP (cm H2O): 16 cmH2O  Resp: 24      Skin Assessment: clean and intact.    Plan: RT will continue to monitor and assess for weaning trial.    Raymundo Carr, RT on 11/15/2022 at 5:21 AM

## 2022-11-15 NOTE — CONSULTS
Care Management Initial Consult    General Information  Assessment completed with: Spouse or significant other, Ofe  Type of CM/SW Visit: Initial Assessment    Primary Care Provider verified and updated as needed: No (per spouse-no current PCP)   Readmission within the last 30 days: no previous admission in last 30 days      Reason for Consult: discharge planning, other (see comments) (elevated risk of readmission)  Advance Care Planning:            Communication Assessment  Patient's communication style: spoken language (English or Bilingual)             Cognitive  Cognitive/Neuro/Behavioral: .WDL except  Level of Consciousness: sedated  Arousal Level: unresponsive  Orientation: other (see comments) (jayjay)  Mood/Behavior: other (see comments) (jayjay)  Best Language:  (jayjay)  Speech: JAYJAY (unable to assess)    Living Environment:   People in home: spouse  Ofe  Current living Arrangements: condominium      Able to return to prior arrangements:         Family/Social Support:  Care provided by: self  Provides care for: no one  Marital Status:   Wife, Sibling(s)  Ofe       Description of Support System: Supportive, Involved         Current Resources:   Patient receiving home care services: No     Community Resources: None  Equipment currently used at home: none  Supplies currently used at home: None    Employment/Financial:  Employment Status: employed full-time        Financial Concerns:             Lifestyle & Psychosocial Needs:  Social Determinants of Health     Tobacco Use: Not on file   Alcohol Use: Not on file   Financial Resource Strain: Not on file   Food Insecurity: Not on file   Transportation Needs: Not on file   Physical Activity: Not on file   Stress: Not on file   Social Connections: Not on file   Intimate Partner Violence: Not on file   Depression: Not at risk     PHQ-2 Score: 0   Housing Stability: Not on file       Functional Status:  Prior to admission patient needed assistance:               Mental Health Status:          Chemical Dependency Status:                Values/Beliefs:  Spiritual, Cultural Beliefs, Latter day Practices, Values that affect care:                 Additional Information:  Per consult for elevated risk of readmission and discharge planning-due to pt being vented, called pt's spouse Ofe.  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  Discussed that once pt is stable and appropriate for PT/OT, he will be assess to determine his discharge needs.  Discussed possible need for dialysis and how that would be arranged in the outpt setting prior to pt's discharged if needed.  Inpatient Care Coordinator will continue to follow for discharge needs.  PETER Giraldo RN, BSN, OCN   Inpatient Care Coordination [address]  Office: [phone]

## 2022-11-15 NOTE — PROGRESS NOTES
Remains intubated and sedated overnight, withdraws and at times grimaces to pain, PERRL, RASS -4, O2 sats above 90% overnnight, minimal secreations overnight.  Remains on CRRT, tolerating net zero, able to titrate down levophed slowly overnight to maintain MAP >65. No electrolyte replacements needed overnight, 2cc urine output overnight, no BM, on trickle feeds, tolerating.

## 2022-11-15 NOTE — CONSULTS
[facility]    Infectious Disease Consultation     Date of Admission:  11/12/2022  Date of Consult : 11/15/22    Assessment:  1.Parainfluenza pneumonia in the setting of immunocompromise (liver transplantation on immunosuppressive medications), with bacterial superinfection -pneumonia and sepsis related to streptococcus pneumonia with acute hypoxic respiratory failure requiring mechanical ventilation. It is unclear whether he had meningitis.   2. Out of hospital cardiac arrest with 20 min CPR for PEA, however patient had gastric intubation en route for an unknown amount of time  2. Marked leukocytosis is likely multifactorial.  Infection may be contributing. May have loculated infection given pleural effusions. However, suspect leukocytosis may be reactive related to organ ischemia   4. S/p liver transplantation for NAFLD related cirrhosis. On Tacrolimus  5. LORETTA improving  7. Thrombocytopenia related to sepsis    Recommendations:  1. Needs further assessment of neurological status/ anoxic brain injury  2. Discontinue Vancomycin, zosyn  3. Treat with Ceftriaxone CNS dosing 2 grams Q12H until CNS infection can be definitively excluded  4. May need to consider bilateral thoracentesis to assess for infection  5. If neurologically intact and additional work up is necessary for leukocytosis, will need to consider spinal fluid analysis /MRI brain to assess for meningitis/empyema   6. Close monitoring for organ ischemia /iscemic bowel injury etc. Last CT abdomen done on 11/12 appeared stable without evidence of bowel ischemia.    Follow clinicals status and labs, ID will continue to follow  Discussed with the critical care team.  Tori Hernández MD    Reason for Consult I was asked to evaluate this patient for evaluation of marked leukocytosis    Primary Care Physician   Ki Powers    Chief Complaint   Sepsis     History is obtained from medical records, patient is intubated and unable to provide a  histroy    History of Present Illness   Blanco Osborne is a 57 year old male with underlying cirrhosis related to NAFLD s/p liver transplantation 2016 , on immunosuppressive therapy with tacrolimus who has been hospitalized since  with out of hospital cardiac arrest, with prolonged resuscitation, esophageal intubation for an unknown amount of time and is now on the ventilator. He was found to have parainfluenza as well as bacteremia related to streptococcus pneumoniae. ID has been asked to evaluate patient due to worsening leukocytosis up to 85K today.    Imaging has shown moderate bilateral pleural effusions and ascites. CT head on admission was stable and repeat imaging is planned. Neurological status cannot be assessed at this time    Past Medical History   I have reviewed this patient's medical history and updated it with pertinent information if needed.   Past Medical History:   Diagnosis Date     Ascites      Cirrhosis of liver with ascites (H) 2016     H/O alcohol abuse      HTN (hypertension)      NAFLD (nonalcoholic fatty liver disease) 2016     CHRISTIAN on CPAP      SBP (spontaneous bacterial peritonitis) (H)     [facility]NGI     Tubular adenoma 10/2019    Large cecal adenoma- due for surveillance colonoscopy in 3 years (10/2022)       Past Surgical History   I have reviewed this patient's surgical history and updated it with pertinent information if needed.  Past Surgical History:   Procedure Laterality Date     APPENDECTOMY       BENCH LIVER N/A 2016    Procedure: BENCH LIVER;  Surgeon: Enoc Crwes MD;  Location: [facility] OR     COLONOSCOPY  13    repeat in 2018     COLONOSCOPY N/A 10/4/2019    Procedure: COLONOSCOPY, WITH POLYPECTOMY AND BIOPSY;  Surgeon: Go Chong MD;  Location: [facility] OR     HERNIA REPAIR       TRANSPLANT LIVER RECIPIENT  DONOR N/A 2016    Procedure: TRANSPLANT LIVER RECIPIENT  DONOR;  Surgeon: Enoc Crews MD;  Location: [facility] OR        Prior to Admission Medications   Prior to Admission Medications   Prescriptions Last Dose Informant Patient Reported? Taking?   Cholecalciferol (VITAMIN D3 GUMMIES PO)   Yes Yes   Sig: Take 1 chew tab by mouth daily   Homeopathic Products (SLEEP CALM SLEEP RELIEF SL) Past Week at hs  Yes Yes   Sig: Take by mouth nightly as needed Magnesium to help with sleep   Multiple Vitamins-Minerals (PRESERVISION AREDS PO)   Yes Yes   Sig: Take 1 capsule by mouth daily   UNABLE TO FIND   Yes Yes   Sig: Take 2 chew tab by mouth daily HumanN SuperBeets Heart Chews (Grape Seed Extract 150mg + Beet Root Powder 500mg)   acetaminophen (TYLENOL) 500 MG tablet Past Week  Yes Yes   Sig: Take 1,000 mg by mouth every 6 hours as needed for mild pain   amLODIPine (NORVASC) 5 MG tablet   No No   Sig: TAKE 1 TABLET EVERY DAY   atenolol (TENORMIN) 50 MG tablet   Yes No   Sig: Take 50 mg by mouth daily   calcium carbonate (TUMS) 500 MG chewable tablet Past Week at often  Yes Yes   Sig: Take 1-2 chew tab by mouth 4 times daily as needed for heartburn   famotidine (PEPCID) 20 MG tablet Past Week at often  Yes Yes   Sig: Take 20 mg by mouth 2 times daily as needed (heartburn)   furosemide (LASIX) 20 MG tablet ?Past Week at ? x1  Yes Yes   Sig: Take 20 mg by mouth once   omeprazole 20 MG tablet Past Week  Yes Yes   Sig: Take 20 mg by mouth daily   order for DME   No No   Sig: Equipment being ordered: Class 2 (30-40mmHg) compression knee high stockings, night time knee high compression alternative, bandaging supplies   Patient not taking: Reported on 2/20/2019   order for DME   No No   Sig: Equipment being ordered: Compression knee high stockings for B LE lymphedema 20-30mmHg   Patient not taking: Reported on 2/20/2019   tacrolimus (GENERIC EQUIVALENT) 1 MG capsule Unknown at Only able to verify did not have AM 11/12/22  No No   Sig: TAKE 1 CAPSULE EVERY MORNING  AND TAKE 2 CAPSULES EVERY EVENING  DOSED  12  HOURS APART       Facility-Administered Medications: None     Allergies   No Known Allergies    Immunization History   Immunization History   Administered Date(s) Administered     Influenza Quad, Recombinant, pf(RIV4) (Flublok) 10/07/2019     Influenza Vaccine IM > 6 months Valent IIV4 (Alfuria,Fluzone) 10/31/2017     Pneumo Conj 13-V (2010&after) 07/10/2018     Twinrix A/B 05/31/2016       Social History   I have reviewed this patient's social history and updated it with pertinent information if needed. Blanco Osborne  reports that he has never smoked. He has never used smokeless tobacco. He reports that he does not currently use alcohol. He reports that he does not use drugs.    Family History   I have reviewed this patient's family history and updated it with pertinent information if needed.   No family history on file.    Review of Systems   Cannot be assessed    Physical Exam   Temp: 98.2  F (36.8  C) Temp src: Esophageal BP: 115/63 Pulse: 64   Resp: 20 SpO2: 98 % O2 Device: Mechanical Ventilator    Vital Signs with Ranges  Temp:  [96.3  F (35.7  C)-98.8  F (37.1  C)] 98.2  F (36.8  C)  Pulse:  [61-76] 64  Resp:  [0-31] 20  BP: (108-131)/(60-71) 115/63  MAP:  [58 mmHg-80 mmHg] 66 mmHg  Arterial Line BP: ()/(42-61) 97/49  FiO2 (%):  [60 %-70 %] 60 %  SpO2:  [92 %-99 %] 98 %  232 lbs 9.36 oz  Body mass index is 31.54 kg/m .    GENERAL APPEARANCE: intubated  Patient is being proned by the ICU team, not examined  CV : S1 S2 on monitor  Skin no rash    Data   All laboratory data reviewed    Component      Latest Ref Rng & Units 11/15/2022   WBC      4.0 - 11.0 10e3/uL 58.0 (HH)   RBC Count      4.40 - 5.90 10e6/uL 3.40 (L)   Hemoglobin      13.3 - 17.7 g/dL 11.7 (L)   Hematocrit      40.0 - 53.0 % 35.2 (L)   MCV      78 - 100 fL 104 (H)   MCH      26.5 - 33.0 pg 34.4 (H)   MCHC      31.5 - 36.5 g/dL 33.2   RDW      10.0 - 15.0 % 14.8   Platelet Count      150 - 450 10e3/uL 88 (L)     Component      Latest Ref Rng & Units  11/15/2022   Sodium      133 - 144 mmol/L 135   Potassium      3.4 - 5.3 mmol/L 4.2   Chloride      94 - 109 mmol/L 102   Carbon Dioxide      20 - 32 mmol/L 19 (L)   Anion Gap      3 - 14 mmol/L 14   Urea Nitrogen      7 - 30 mg/dL 35 (H)   Creatinine      0.66 - 1.25 mg/dL 1.60 (H)   Calcium      8.5 - 10.1 mg/dL 8.3 (L)   Glucose      70 - 99 mg/dL 177 (H)   Albumin      3.4 - 5.0 g/dL 2.0 (L)   Phosphorus      2.5 - 4.5 mg/dL 4.0   GFR Estimate      >60 mL/min/1.73m2 50 (L)     Component      Latest Ref Rng & Units 11/15/2022   Bilirubin Total      0.2 - 1.3 mg/dL 3.0 (H)   Bilirubin Direct      0.0 - 0.2 mg/dL 2.3 (H)   Protein Total      6.8 - 8.8 g/dL 5.8 (L)   Albumin      3.4 - 5.0 g/dL 2.0 (L)   Alkaline Phosphatase      40 - 150 U/L 172 (H)   AST      0 - 45 U/L 62 (H)   ALT      0 - 70 U/L 33     Component      Latest Ref Rng & Units 11/12/2022 11/13/2022   Influenza A      Not Detected Negative Not Detected   Influenza B      Not Detected Negative Not Detected   Resp Syncytial Virus      Negative Negative    SARS CoV2 PCR      Negative Negative      Microbiology  11/12 blood cx  Peripheral Blood    0 Result Notes  Culture Positive on the 1st day of incubation Abnormal        Streptococcus pneumoniae Panic     2 of 2 bottles  Sent to [facility] for serotyping, report to follow.        Resulting Agency: [facility]     Susceptibility     Streptococcus pneumoniae     KARAN     Ceftriaxone (meningitis) 0.016 ug/mL Susceptible     Ceftriaxone (non-meningitis) 0.016 ug/mL Susceptible     Levofloxacin 1.5 ug/mL Susceptible     Meropenem 0.012 ug/mL Susceptible     Penicillin (meningitis) 0.016 ug/mL Susceptible     Penicillin (non-meningitis) 0.016 ug/mL Susceptible     Penicillin(oral) 0.016 ug/mL Susceptible     Vancomycin 0.50 ug/mL Susceptible                     Imaging  11/12 CT chest abdomen pelvis  EXAM: CT CHEST ABDOMEN PELVIS W/O CONTRAST  LOCATION: [facility]  HOSPITAL  DATE/TIME: 11/12/2022 2:27 PM     INDICATION: Cardiac arrest  COMPARISON: PET CT on 09/17/2020 and chest x-ray earlier today  TECHNIQUE: CT scan of the chest, abdomen, and pelvis was performed without IV contrast. Multiplanar reformats were obtained. Dose reduction techniques were used.   CONTRAST: None.     FINDINGS:   LUNGS AND PLEURA: Large right and moderate left pleural effusion and dense consolidation/atelectasis in the lung bases. The endotracheal tube is in the midtrachea. The central tracheobronchial tree is patent.     MEDIASTINUM/AXILLAE: Several small bilateral supraclavicular lymph nodes, indeterminate. No lymphadenopathy in the axillae or mediastinum. No thoracic aortic aneurysm. Mild generalized cardiomegaly. Small pericardial effusion.     CORONARY ARTERY CALCIFICATION: Moderate.     HEPATOBILIARY: Postoperative changes of disease donor liver transplant. Cholecystectomy. No definite focal masses in the hepatic allograft on noncontrast CT.     PANCREAS: No duct dilatation or peripancreatic changes. No definite masses on noncontrast CT.     SPLEEN: Stable mild splenomegaly.     ADRENAL GLANDS: Mild diffuse thickening of the adrenal glands.     KIDNEYS/BLADDER: Small nonobstructing bilateral renal calculi measuring 3-4 mm. No hydronephrosis or perinephric stranding bilaterally. No renal masses evident on noncontrast CT. Nixon catheter decompresses the urinary bladder.     BOWEL: NG tube is in the stomach. No small bowel or colonic obstruction or inflammatory changes.     LYMPH NODES: No lymphadenopathy.     VASCULATURE: No abdominal aortic aneurysm.     PELVIC ORGANS: No pelvic masses.     OTHER: Moderate ascites. Mild anasarca. No free air or fluid collections. Right common femoral central venous catheter with tip in the mid IVC, at the level of the renal vein confluence.     MUSCULOSKELETAL: Old healed rib fracture deformities. No suspicious lesions in the bones.                                                                       IMPRESSION:  1.  Large right and moderate left pleural effusion and bibasilar dense atelectasis/consolidation.  2.  Moderate ascites.  3.  Stable appearance of the hepatic allograft on noncontrast CT.  4.  Stable mild splenomegaly.  5.  Mild generalized cardiac enlargement and small pericardial effusion.  6.  Numerous small supraclavicular lymph nodes are nonspecific and likely reactive       11/12 CT head  EXAM: CT HEAD W/O CONTRAST  LOCATION: [facility]  DATE/TIME: 11/12/2022 2:27 PM     INDICATION: Cardiac arrest; altered mental state  COMPARISON: None.  TECHNIQUE: Routine CT Head without IV contrast. Multiplanar reformats. Dose reduction techniques were used.     FINDINGS:  INTRACRANIAL CONTENTS: No intracranial hemorrhage, extraaxial collection, or mass effect.  No CT evidence of acute infarct. Mild presumed chronic small vessel ischemic changes. Mild generalized volume loss. No hydrocephalus. Corpus callosum is normal.   Position of the cerebral tonsils is satisfactory. Sella shows no acute abnormality. Vascular calcification.      VISUALIZED ORBITS/SINUSES/MASTOIDS: No intraorbital abnormality. Mild to moderate mucosal thickening scattered about the paranasal sinuses. Patient is intubated nasally and orally. Trace fluid mastoid air cells bilaterally.     BONES/SOFT TISSUES: Overall mineralization is satisfactory. No fracture of the calvarium or skull base is noted. No swelling of the facial or scalp soft tissues.                                                                      IMPRESSION:  1.  No acute process intracranially.     2.  Patient is intubated nasally and orally.     3.  No intracranial mass, mass effect, or hemorrhage.     4.  Gray-white differentiation is satisfactory with no convincing evidence for cerebral edema.     5.  Partial opacification paranasal sinuses. This may be on an inflammatory basis.

## 2022-11-16 ENCOUNTER — APPOINTMENT (OUTPATIENT)
Dept: GENERAL RADIOLOGY | Facility: CLINIC | Age: 57
DRG: 004 | End: 2022-11-16
Attending: STUDENT IN AN ORGANIZED HEALTH CARE EDUCATION/TRAINING PROGRAM
Payer: COMMERCIAL

## 2022-11-16 ENCOUNTER — APPOINTMENT (OUTPATIENT)
Dept: CT IMAGING | Facility: CLINIC | Age: 57
DRG: 004 | End: 2022-11-16
Attending: PHYSICIAN ASSISTANT
Payer: COMMERCIAL

## 2022-11-16 LAB
ALBUMIN SERPL-MCNC: 1.9 G/DL (ref 3.4–5)
ALBUMIN SERPL-MCNC: 2 G/DL (ref 3.4–5)
ALP SERPL-CCNC: 188 U/L (ref 40–150)
ALT SERPL W P-5'-P-CCNC: 41 U/L (ref 0–70)
ANION GAP SERPL CALCULATED.3IONS-SCNC: 11 MMOL/L (ref 3–14)
ANION GAP SERPL CALCULATED.3IONS-SCNC: 14 MMOL/L (ref 3–14)
ANION GAP SERPL CALCULATED.3IONS-SCNC: 16 MMOL/L (ref 3–14)
ANION GAP SERPL CALCULATED.3IONS-SCNC: 16 MMOL/L (ref 3–14)
AST SERPL W P-5'-P-CCNC: 74 U/L (ref 0–45)
BACTERIA SPT CULT: ABNORMAL
BASE EXCESS BLDA CALC-SCNC: -4.8 MMOL/L (ref -9–1.8)
BASE EXCESS BLDA CALC-SCNC: -6.2 MMOL/L (ref -9–1.8)
BILIRUB DIRECT SERPL-MCNC: 1.9 MG/DL (ref 0–0.2)
BILIRUB SERPL-MCNC: 2.8 MG/DL (ref 0.2–1.3)
BUN SERPL-MCNC: 32 MG/DL (ref 7–30)
BUN SERPL-MCNC: 33 MG/DL (ref 7–30)
BUN SERPL-MCNC: 35 MG/DL (ref 7–30)
BUN SERPL-MCNC: 39 MG/DL (ref 7–30)
CA-I BLD-MCNC: 4.4 MG/DL (ref 4.4–5.2)
CA-I BLD-MCNC: 4.6 MG/DL (ref 4.4–5.2)
CA-I BLD-MCNC: 4.7 MG/DL (ref 4.4–5.2)
CALCIUM SERPL-MCNC: 8 MG/DL (ref 8.5–10.1)
CALCIUM SERPL-MCNC: 8.2 MG/DL (ref 8.5–10.1)
CALCIUM SERPL-MCNC: 8.3 MG/DL (ref 8.5–10.1)
CALCIUM SERPL-MCNC: 8.5 MG/DL (ref 8.5–10.1)
CHLORIDE BLD-SCNC: 101 MMOL/L (ref 94–109)
CHLORIDE BLD-SCNC: 102 MMOL/L (ref 94–109)
CHLORIDE BLD-SCNC: 103 MMOL/L (ref 94–109)
CHLORIDE BLD-SCNC: 104 MMOL/L (ref 94–109)
CO2 SERPL-SCNC: 17 MMOL/L (ref 20–32)
CO2 SERPL-SCNC: 20 MMOL/L (ref 20–32)
CREAT SERPL-MCNC: 1.21 MG/DL (ref 0.66–1.25)
CREAT SERPL-MCNC: 1.27 MG/DL (ref 0.66–1.25)
CREAT SERPL-MCNC: 1.28 MG/DL (ref 0.66–1.25)
CREAT SERPL-MCNC: 1.45 MG/DL (ref 0.66–1.25)
ERYTHROCYTE [DISTWIDTH] IN BLOOD BY AUTOMATED COUNT: 14.5 % (ref 10–15)
ERYTHROCYTE [DISTWIDTH] IN BLOOD BY AUTOMATED COUNT: 14.6 % (ref 10–15)
ERYTHROCYTE [DISTWIDTH] IN BLOOD BY AUTOMATED COUNT: 14.8 % (ref 10–15)
GFR SERPL CREATININE-BSD FRML MDRD: 56 ML/MIN/1.73M2
GFR SERPL CREATININE-BSD FRML MDRD: 65 ML/MIN/1.73M2
GFR SERPL CREATININE-BSD FRML MDRD: 65 ML/MIN/1.73M2
GFR SERPL CREATININE-BSD FRML MDRD: 69 ML/MIN/1.73M2
GLUCOSE BLD-MCNC: 165 MG/DL (ref 70–99)
GLUCOSE BLD-MCNC: 165 MG/DL (ref 70–99)
GLUCOSE BLD-MCNC: 213 MG/DL (ref 70–99)
GLUCOSE BLD-MCNC: 224 MG/DL (ref 70–99)
GLUCOSE BLDC GLUCOMTR-MCNC: 185 MG/DL (ref 70–99)
GLUCOSE BLDC GLUCOMTR-MCNC: 191 MG/DL (ref 70–99)
GLUCOSE BLDC GLUCOMTR-MCNC: 193 MG/DL (ref 70–99)
GLUCOSE BLDC GLUCOMTR-MCNC: 194 MG/DL (ref 70–99)
GRAM STAIN RESULT: ABNORMAL
GRAM STAIN RESULT: ABNORMAL
HCO3 BLD-SCNC: 20 MMOL/L (ref 21–28)
HCO3 BLD-SCNC: 20 MMOL/L (ref 21–28)
HCT VFR BLD AUTO: 29.4 % (ref 40–53)
HCT VFR BLD AUTO: 31.5 % (ref 40–53)
HCT VFR BLD AUTO: 33.6 % (ref 40–53)
HGB BLD-MCNC: 10.8 G/DL (ref 13.3–17.7)
HGB BLD-MCNC: 11.2 G/DL (ref 13.3–17.7)
HGB BLD-MCNC: 9.9 G/DL (ref 13.3–17.7)
MAGNESIUM SERPL-MCNC: 2.3 MG/DL (ref 1.6–2.3)
MAGNESIUM SERPL-MCNC: 2.4 MG/DL (ref 1.6–2.3)
MAGNESIUM SERPL-MCNC: 2.4 MG/DL (ref 1.6–2.3)
MCH RBC QN AUTO: 32.9 PG (ref 26.5–33)
MCH RBC QN AUTO: 33.7 PG (ref 26.5–33)
MCH RBC QN AUTO: 34.3 PG (ref 26.5–33)
MCHC RBC AUTO-ENTMCNC: 33.3 G/DL (ref 31.5–36.5)
MCHC RBC AUTO-ENTMCNC: 33.7 G/DL (ref 31.5–36.5)
MCHC RBC AUTO-ENTMCNC: 34.3 G/DL (ref 31.5–36.5)
MCV RBC AUTO: 100 FL (ref 78–100)
MCV RBC AUTO: 101 FL (ref 78–100)
MCV RBC AUTO: 98 FL (ref 78–100)
O2/TOTAL GAS SETTING VFR VENT: 50 %
O2/TOTAL GAS SETTING VFR VENT: 60 %
PATH REPORT.COMMENTS IMP SPEC: NORMAL
PATH REPORT.FINAL DX SPEC: NORMAL
PATH REPORT.MICROSCOPIC SPEC OTHER STN: NORMAL
PATH REPORT.MICROSCOPIC SPEC OTHER STN: NORMAL
PCO2 BLD: 37 MM HG (ref 35–45)
PCO2 BLD: 39 MM HG (ref 35–45)
PH BLD: 7.31 [PH] (ref 7.35–7.45)
PH BLD: 7.35 [PH] (ref 7.35–7.45)
PHOSPHATE SERPL-MCNC: 3.4 MG/DL (ref 2.5–4.5)
PHOSPHATE SERPL-MCNC: 3.7 MG/DL (ref 2.5–4.5)
PHOSPHATE SERPL-MCNC: 3.8 MG/DL (ref 2.5–4.5)
PLATELET # BLD AUTO: 63 10E3/UL (ref 150–450)
PLATELET # BLD AUTO: 77 10E3/UL (ref 150–450)
PLATELET # BLD AUTO: 89 10E3/UL (ref 150–450)
PO2 BLD: 105 MM HG (ref 80–105)
PO2 BLD: 72 MM HG (ref 80–105)
POTASSIUM BLD-SCNC: 3.8 MMOL/L (ref 3.4–5.3)
POTASSIUM BLD-SCNC: 3.9 MMOL/L (ref 3.4–5.3)
POTASSIUM BLD-SCNC: 4 MMOL/L (ref 3.4–5.3)
POTASSIUM BLD-SCNC: 4.3 MMOL/L (ref 3.4–5.3)
PROT SERPL-MCNC: 5.6 G/DL (ref 6.8–8.8)
RBC # BLD AUTO: 3.01 10E6/UL (ref 4.4–5.9)
RBC # BLD AUTO: 3.15 10E6/UL (ref 4.4–5.9)
RBC # BLD AUTO: 3.32 10E6/UL (ref 4.4–5.9)
SODIUM SERPL-SCNC: 134 MMOL/L (ref 133–144)
SODIUM SERPL-SCNC: 134 MMOL/L (ref 133–144)
SODIUM SERPL-SCNC: 135 MMOL/L (ref 133–144)
SODIUM SERPL-SCNC: 135 MMOL/L (ref 133–144)
UFH PPP CHRO-ACNC: <0.1 IU/ML
VANCOMYCIN SERPL-MCNC: 13.6 MG/L
WBC # BLD AUTO: 39.5 10E3/UL (ref 4–11)
WBC # BLD AUTO: 53.5 10E3/UL (ref 4–11)
WBC # BLD AUTO: 55.4 10E3/UL (ref 4–11)

## 2022-11-16 PROCEDURE — 99291 CRITICAL CARE FIRST HOUR: CPT | Mod: FS | Performed by: PSYCHIATRY & NEUROLOGY

## 2022-11-16 PROCEDURE — 84100 ASSAY OF PHOSPHORUS: CPT | Performed by: STUDENT IN AN ORGANIZED HEALTH CARE EDUCATION/TRAINING PROGRAM

## 2022-11-16 PROCEDURE — 85520 HEPARIN ASSAY: CPT | Performed by: STUDENT IN AN ORGANIZED HEALTH CARE EDUCATION/TRAINING PROGRAM

## 2022-11-16 PROCEDURE — 250N000009 HC RX 250: Performed by: STUDENT IN AN ORGANIZED HEALTH CARE EDUCATION/TRAINING PROGRAM

## 2022-11-16 PROCEDURE — 82803 BLOOD GASES ANY COMBINATION: CPT | Performed by: STUDENT IN AN ORGANIZED HEALTH CARE EDUCATION/TRAINING PROGRAM

## 2022-11-16 PROCEDURE — 99233 SBSQ HOSP IP/OBS HIGH 50: CPT | Performed by: SPECIALIST

## 2022-11-16 PROCEDURE — 82330 ASSAY OF CALCIUM: CPT | Performed by: STUDENT IN AN ORGANIZED HEALTH CARE EDUCATION/TRAINING PROGRAM

## 2022-11-16 PROCEDURE — 258N000003 HC RX IP 258 OP 636: Performed by: STUDENT IN AN ORGANIZED HEALTH CARE EDUCATION/TRAINING PROGRAM

## 2022-11-16 PROCEDURE — 94645 CONT INHLJ TX EACH ADDL HOUR: CPT

## 2022-11-16 PROCEDURE — 70450 CT HEAD/BRAIN W/O DYE: CPT

## 2022-11-16 PROCEDURE — 999N000009 HC STATISTIC AIRWAY CARE

## 2022-11-16 PROCEDURE — 87070 CULTURE OTHR SPECIMN AEROBIC: CPT | Performed by: STUDENT IN AN ORGANIZED HEALTH CARE EDUCATION/TRAINING PROGRAM

## 2022-11-16 PROCEDURE — 999N000157 HC STATISTIC RCP TIME EA 10 MIN

## 2022-11-16 PROCEDURE — 999N000065 XR CHEST PORT 1 VIEW

## 2022-11-16 PROCEDURE — 250N000013 HC RX MED GY IP 250 OP 250 PS 637: Performed by: STUDENT IN AN ORGANIZED HEALTH CARE EDUCATION/TRAINING PROGRAM

## 2022-11-16 PROCEDURE — 80202 ASSAY OF VANCOMYCIN: CPT | Performed by: STUDENT IN AN ORGANIZED HEALTH CARE EDUCATION/TRAINING PROGRAM

## 2022-11-16 PROCEDURE — 83735 ASSAY OF MAGNESIUM: CPT | Performed by: STUDENT IN AN ORGANIZED HEALTH CARE EDUCATION/TRAINING PROGRAM

## 2022-11-16 PROCEDURE — 250N000011 HC RX IP 250 OP 636: Performed by: STUDENT IN AN ORGANIZED HEALTH CARE EDUCATION/TRAINING PROGRAM

## 2022-11-16 PROCEDURE — 99292 CRITICAL CARE ADDL 30 MIN: CPT | Mod: FS | Performed by: PSYCHIATRY & NEUROLOGY

## 2022-11-16 PROCEDURE — 32551 INSERTION OF CHEST TUBE: CPT | Performed by: STUDENT IN AN ORGANIZED HEALTH CARE EDUCATION/TRAINING PROGRAM

## 2022-11-16 PROCEDURE — C9113 INJ PANTOPRAZOLE SODIUM, VIA: HCPCS | Performed by: STUDENT IN AN ORGANIZED HEALTH CARE EDUCATION/TRAINING PROGRAM

## 2022-11-16 PROCEDURE — 85060 BLOOD SMEAR INTERPRETATION: CPT | Performed by: PATHOLOGY

## 2022-11-16 PROCEDURE — 99291 CRITICAL CARE FIRST HOUR: CPT | Mod: 25 | Performed by: STUDENT IN AN ORGANIZED HEALTH CARE EDUCATION/TRAINING PROGRAM

## 2022-11-16 PROCEDURE — 200N000001 HC R&B ICU

## 2022-11-16 PROCEDURE — 82248 BILIRUBIN DIRECT: CPT | Performed by: INTERNAL MEDICINE

## 2022-11-16 PROCEDURE — 85027 COMPLETE CBC AUTOMATED: CPT | Performed by: STUDENT IN AN ORGANIZED HEALTH CARE EDUCATION/TRAINING PROGRAM

## 2022-11-16 PROCEDURE — 94640 AIRWAY INHALATION TREATMENT: CPT | Mod: 76

## 2022-11-16 PROCEDURE — 94003 VENT MGMT INPAT SUBQ DAY: CPT

## 2022-11-16 PROCEDURE — 250N000009 HC RX 250: Performed by: INTERNAL MEDICINE

## 2022-11-16 PROCEDURE — 71045 X-RAY EXAM CHEST 1 VIEW: CPT

## 2022-11-16 PROCEDURE — 99232 SBSQ HOSP IP/OBS MODERATE 35: CPT | Performed by: STUDENT IN AN ORGANIZED HEALTH CARE EDUCATION/TRAINING PROGRAM

## 2022-11-16 PROCEDURE — 94640 AIRWAY INHALATION TREATMENT: CPT

## 2022-11-16 PROCEDURE — G0463 HOSPITAL OUTPT CLINIC VISIT: HCPCS

## 2022-11-16 PROCEDURE — 250N000012 HC RX MED GY IP 250 OP 636 PS 637: Performed by: STUDENT IN AN ORGANIZED HEALTH CARE EDUCATION/TRAINING PROGRAM

## 2022-11-16 RX ORDER — DEXTROSE MONOHYDRATE 25 G/50ML
25-50 INJECTION, SOLUTION INTRAVENOUS
Status: DISCONTINUED | OUTPATIENT
Start: 2022-11-16 | End: 2022-11-19 | Stop reason: ALTCHOICE

## 2022-11-16 RX ORDER — NICOTINE POLACRILEX 4 MG
15-30 LOZENGE BUCCAL
Status: DISCONTINUED | OUTPATIENT
Start: 2022-11-16 | End: 2022-11-19 | Stop reason: ALTCHOICE

## 2022-11-16 RX ADMIN — INSULIN ASPART 2 UNITS: 100 INJECTION, SOLUTION INTRAVENOUS; SUBCUTANEOUS at 13:10

## 2022-11-16 RX ADMIN — ALBUTEROL SULFATE 2.5 MG: 2.5 SOLUTION RESPIRATORY (INHALATION) at 13:03

## 2022-11-16 RX ADMIN — PROPOFOL 60 MCG/KG/MIN: 10 INJECTION, EMULSION INTRAVENOUS at 06:26

## 2022-11-16 RX ADMIN — ALBUTEROL SULFATE 2.5 MG: 2.5 SOLUTION RESPIRATORY (INHALATION) at 00:02

## 2022-11-16 RX ADMIN — METHYLPREDNISOLONE SODIUM SUCCINATE 62.5 MG: 125 INJECTION, POWDER, FOR SOLUTION INTRAMUSCULAR; INTRAVENOUS at 22:32

## 2022-11-16 RX ADMIN — PANTOPRAZOLE SODIUM 40 MG: 40 INJECTION, POWDER, FOR SOLUTION INTRAVENOUS at 09:56

## 2022-11-16 RX ADMIN — MINERAL OIL, PETROLATUM: 425; 573 OINTMENT OPHTHALMIC at 16:36

## 2022-11-16 RX ADMIN — CALCIUM CHLORIDE, MAGNESIUM CHLORIDE, SODIUM CHLORIDE, SODIUM BICARBONATE, POTASSIUM CHLORIDE AND SODIUM PHOSPHATE DIBASIC DIHYDRATE 5000 ML: 3.68; 3.05; 6.34; 3.09; .314; .187 INJECTION INTRAVENOUS at 18:07

## 2022-11-16 RX ADMIN — CALCIUM CHLORIDE, MAGNESIUM CHLORIDE, SODIUM CHLORIDE, SODIUM BICARBONATE, POTASSIUM CHLORIDE AND SODIUM PHOSPHATE DIBASIC DIHYDRATE 5000 ML: 3.68; 3.05; 6.34; 3.09; .314; .187 INJECTION INTRAVENOUS at 20:26

## 2022-11-16 RX ADMIN — Medication 15 ML: at 10:35

## 2022-11-16 RX ADMIN — CALCIUM CHLORIDE, MAGNESIUM CHLORIDE, SODIUM CHLORIDE, SODIUM BICARBONATE, POTASSIUM CHLORIDE AND SODIUM PHOSPHATE DIBASIC DIHYDRATE 5000 ML: 3.68; 3.05; 6.34; 3.09; .314; .187 INJECTION INTRAVENOUS at 04:15

## 2022-11-16 RX ADMIN — VASOPRESSIN 2.4 UNITS/HR: 20 INJECTION INTRAVENOUS at 00:19

## 2022-11-16 RX ADMIN — HEPARIN SODIUM 500 UNITS/HR: 10000 INJECTION, SOLUTION INTRAVENOUS at 10:34

## 2022-11-16 RX ADMIN — EPOPROSTENOL 20 NG/KG/MIN: 1.5 INJECTION, POWDER, LYOPHILIZED, FOR SOLUTION INTRAVENOUS at 20:46

## 2022-11-16 RX ADMIN — CALCIUM CHLORIDE, MAGNESIUM CHLORIDE, SODIUM CHLORIDE, SODIUM BICARBONATE, POTASSIUM CHLORIDE AND SODIUM PHOSPHATE DIBASIC DIHYDRATE 5000 ML: 3.68; 3.05; 6.34; 3.09; .314; .187 INJECTION INTRAVENOUS at 13:33

## 2022-11-16 RX ADMIN — CALCIUM CHLORIDE, MAGNESIUM CHLORIDE, DEXTROSE MONOHYDRATE, LACTIC ACID, SODIUM CHLORIDE, SODIUM BICARBONATE AND POTASSIUM CHLORIDE 5000 ML: 5.15; 2.03; 22; 5.4; 6.46; 3.09; .157 INJECTION INTRAVENOUS at 15:36

## 2022-11-16 RX ADMIN — CHLORHEXIDINE GLUCONATE 0.12% ORAL RINSE 15 ML: 1.2 LIQUID ORAL at 10:34

## 2022-11-16 RX ADMIN — PROPOFOL 60 MCG/KG/MIN: 10 INJECTION, EMULSION INTRAVENOUS at 09:07

## 2022-11-16 RX ADMIN — ALBUTEROL SULFATE 2.5 MG: 2.5 SOLUTION RESPIRATORY (INHALATION) at 18:09

## 2022-11-16 RX ADMIN — METHYLPREDNISOLONE SODIUM SUCCINATE 62.5 MG: 125 INJECTION, POWDER, FOR SOLUTION INTRAMUSCULAR; INTRAVENOUS at 03:38

## 2022-11-16 RX ADMIN — CALCIUM CHLORIDE, MAGNESIUM CHLORIDE, SODIUM CHLORIDE, SODIUM BICARBONATE, POTASSIUM CHLORIDE AND SODIUM PHOSPHATE DIBASIC DIHYDRATE 5000 ML: 3.68; 3.05; 6.34; 3.09; .314; .187 INJECTION INTRAVENOUS at 06:29

## 2022-11-16 RX ADMIN — INSULIN ASPART 2 UNITS: 100 INJECTION, SOLUTION INTRAVENOUS; SUBCUTANEOUS at 21:41

## 2022-11-16 RX ADMIN — CALCIUM CHLORIDE, MAGNESIUM CHLORIDE, SODIUM CHLORIDE, SODIUM BICARBONATE, POTASSIUM CHLORIDE AND SODIUM PHOSPHATE DIBASIC DIHYDRATE 5000 ML: 3.68; 3.05; 6.34; 3.09; .314; .187 INJECTION INTRAVENOUS at 22:32

## 2022-11-16 RX ADMIN — ALBUTEROL SULFATE 2.5 MG: 2.5 SOLUTION RESPIRATORY (INHALATION) at 07:31

## 2022-11-16 RX ADMIN — EPOPROSTENOL 20 NG/KG/MIN: 1.5 INJECTION, POWDER, LYOPHILIZED, FOR SOLUTION INTRAVENOUS at 15:51

## 2022-11-16 RX ADMIN — PROPOFOL 60 MCG/KG/MIN: 10 INJECTION, EMULSION INTRAVENOUS at 03:38

## 2022-11-16 RX ADMIN — CALCIUM CHLORIDE, MAGNESIUM CHLORIDE, SODIUM CHLORIDE, SODIUM BICARBONATE, POTASSIUM CHLORIDE AND SODIUM PHOSPHATE DIBASIC DIHYDRATE 5000 ML: 3.68; 3.05; 6.34; 3.09; .314; .187 INJECTION INTRAVENOUS at 02:21

## 2022-11-16 RX ADMIN — CEFTRIAXONE SODIUM 2 G: 2 INJECTION, POWDER, FOR SOLUTION INTRAMUSCULAR; INTRAVENOUS at 17:52

## 2022-11-16 RX ADMIN — CEFTRIAXONE SODIUM 2 G: 2 INJECTION, POWDER, FOR SOLUTION INTRAMUSCULAR; INTRAVENOUS at 03:35

## 2022-11-16 RX ADMIN — CALCIUM CHLORIDE, MAGNESIUM CHLORIDE, DEXTROSE MONOHYDRATE, LACTIC ACID, SODIUM CHLORIDE, SODIUM BICARBONATE AND POTASSIUM CHLORIDE 5000 ML: 5.15; 2.03; 22; 5.4; 6.46; 3.09; .157 INJECTION INTRAVENOUS at 00:27

## 2022-11-16 RX ADMIN — VASOPRESSIN 2.4 UNITS/HR: 20 INJECTION INTRAVENOUS at 09:08

## 2022-11-16 RX ADMIN — CALCIUM CHLORIDE, MAGNESIUM CHLORIDE, SODIUM CHLORIDE, SODIUM BICARBONATE, POTASSIUM CHLORIDE AND SODIUM PHOSPHATE DIBASIC DIHYDRATE 5000 ML: 3.68; 3.05; 6.34; 3.09; .314; .187 INJECTION INTRAVENOUS at 15:51

## 2022-11-16 RX ADMIN — VASOPRESSIN 2.4 UNITS/HR: 20 INJECTION INTRAVENOUS at 18:39

## 2022-11-16 RX ADMIN — INSULIN ASPART 1 UNITS: 100 INJECTION, SOLUTION INTRAVENOUS; SUBCUTANEOUS at 16:35

## 2022-11-16 RX ADMIN — CALCIUM CHLORIDE, MAGNESIUM CHLORIDE, DEXTROSE MONOHYDRATE, LACTIC ACID, SODIUM CHLORIDE, SODIUM BICARBONATE AND POTASSIUM CHLORIDE 5000 ML: 5.15; 2.03; 22; 5.4; 6.46; 3.09; .157 INJECTION INTRAVENOUS at 20:29

## 2022-11-16 RX ADMIN — METHYLPREDNISOLONE SODIUM SUCCINATE 62.5 MG: 125 INJECTION, POWDER, FOR SOLUTION INTRAMUSCULAR; INTRAVENOUS at 17:52

## 2022-11-16 RX ADMIN — METHYLPREDNISOLONE SODIUM SUCCINATE 62.5 MG: 125 INJECTION, POWDER, FOR SOLUTION INTRAMUSCULAR; INTRAVENOUS at 10:52

## 2022-11-16 RX ADMIN — CHLORHEXIDINE GLUCONATE 0.12% ORAL RINSE 15 ML: 1.2 LIQUID ORAL at 19:46

## 2022-11-16 RX ADMIN — CALCIUM CHLORIDE, MAGNESIUM CHLORIDE, DEXTROSE MONOHYDRATE, LACTIC ACID, SODIUM CHLORIDE, SODIUM BICARBONATE AND POTASSIUM CHLORIDE 5000 ML: 5.15; 2.03; 22; 5.4; 6.46; 3.09; .157 INJECTION INTRAVENOUS at 05:35

## 2022-11-16 RX ADMIN — Medication 0.16 MCG/KG/MIN: at 01:23

## 2022-11-16 RX ADMIN — EPOPROSTENOL 20 NG/KG/MIN: 1.5 INJECTION, POWDER, LYOPHILIZED, FOR SOLUTION INTRAVENOUS at 10:03

## 2022-11-16 RX ADMIN — PROPOFOL 60 MCG/KG/MIN: 10 INJECTION, EMULSION INTRAVENOUS at 00:58

## 2022-11-16 RX ADMIN — EPOPROSTENOL 20 NG/KG/MIN: 1.5 INJECTION, POWDER, LYOPHILIZED, FOR SOLUTION INTRAVENOUS at 05:00

## 2022-11-16 RX ADMIN — Medication 0.1 MCG/KG/MIN: at 21:14

## 2022-11-16 RX ADMIN — Medication 0.17 MCG/KG/MIN: at 14:27

## 2022-11-16 ASSESSMENT — ACTIVITIES OF DAILY LIVING (ADL)
ADLS_ACUITY_SCORE: 47

## 2022-11-16 NOTE — PROGRESS NOTES
[facility] ICU RESPIRATORY NOTE        Date of Admission: 11/12/2022    Date of Intubation (most recent): 11/12/2022    Reason for Mechanical Ventilation: Respiratory failure    Number of Days on Mechanical Ventilation: 5    Met Criteria for Spontaneous Breathing Trial: No    Reason for No Spontaneous Breathing Trial: Proned, high PEEP    Significant Events Today: Patient continues to be proned overnight with Q2H head turns.     ABG Results:   Recent Labs   Lab 11/15/22  1602 11/15/22  0835 11/14/22  0551 11/13/22  0827   PH 7.30* 7.29* 7.20* 7.24*   PCO2 40 44 53* 46*   PO2 99 83 74* 92   HCO3 20* 21 21 20*   O2PER 60 60 80 75       Current Vent Settings: Vent Mode: CMV/AC  (Continuous Mandatory Ventilation/ Assist Control)  FiO2 (%): 60 %  Resp Rate (Set): 24 breaths/min  Tidal Volume (Set, mL): 400 mL  PEEP (cm H2O): 14 cmH2O  Resp: 24        Jayson Marie, RT on 11/16/2022 at 5:29 AM

## 2022-11-16 NOTE — PROGRESS NOTES
[facility] ICU RESPIRATORY NOTE        Date of Admission: 11/12/2022    Date of Intubation (most recent): 11/12/22    Reason for Mechanical Ventilation: Respiratory failure    Number of Days on Mechanical Ventilation: 4    Met Criteria for Spontaneous Breathing Trial: No    Reason for No Spontaneous Breathing Trial: Pt Prone and on High PEEP     Significant Events Today: None    ABG Results:   Recent Labs   Lab 11/15/22  1602 11/15/22  0835 11/14/22  0551 11/13/22  0827   PH 7.30* 7.29* 7.20* 7.24*   PCO2 40 44 53* 46*   PO2 99 83 74* 92   HCO3 20* 21 21 20*   O2PER 60 60 80 75       Current Vent Settings: Vent Mode: CMV/AC  (Continuous Mandatory Ventilation/ Assist Control)  FiO2 (%): 60 %  Resp Rate (Set): 24 breaths/min  Tidal Volume (Set, mL): 400 mL  PEEP (cm H2O): 14 cmH2O  Resp: 24      Skin Assessment:  It was reported that the pt had a skin tear above the upper lip towards  to under his nose. Opti foam in place. Bedside RN and MD aware.     Plan: We will continue with head turn Q2H and monitor the skin.    Lisa Craig, RT on 11/15/2022 at 9:33 PM

## 2022-11-16 NOTE — PROGRESS NOTES
[facility]    Neurocritical care progress Note    Interval EventsCTH this morning without acute pathology. No change to exam today. Continues on pressors and sedation. Requiring intermittent pronation for respiratory failure.    HPI Summary  Blanco Osborne is a 58 yo Male with pertinent past medical history of nonalcoholic fatty liver disease/cirrhosis s/p transplant 09/2016.    He presented to the emergency department 11/12/2022 following cardiac arrest after calling EMS himself due to shortness of breath and weakness.  He had been complaining to family of fever, fatigue, cough, confusion x1 week prior (tested positive on admission for parainfluenza and Streptococcal bacteremia. ).  On EMS arrival he was found in PEA s/p epi and bicarb achieved ROSC.  CTh unremarkable.  EEG with severe diffuse nonspecific encephalopathy. Hypothermia protocol initiated.  Began rewarming 11/13/2022.  Achieved normothermia 11/14/2022.     Difficulties in weaning propofol and maintaining RASS.    Evaluation Summarized  CTH: 11/16/22: Chronic SVID, negative for acute pathology  CT head 11/12: no evidence of cerebral edema or loss of gray-white differentiation   EEG: No epileptiform discharges or electrographic seizures were recorded; Findings are consistent with severe diffuse nonspecific encephalopathy      Impression  Cardiac arrest, s/p hypothermic protocol, rewarmed to normothermia 11/14/2022, concern of possible underlying anoxic brain injury    Acute hypoxemic respiratory failure s/p mechanical ventilation with parainfluenza and Streptococcal bacteremia     Plan  -Q2 hour neurochecks  -attempt to wean sedation as able to medically tolerate, continues on propofol at this time  -currently on zosyn and vancomycin  -Remains on pressors Levophed and vasopressin  -STAT head CT for change in neurological excam  - We will plan for MRI brain with and without contrast 72 hours post rewarming (11/17/2022) to  "assess for anoxic brain injury if medically stable  -euthermia (since 11/14/22), eunatremia (Na 134 this AM, stable), euglycemia (glucose 165 this AM, goal 120-180)    Patient Follow-up    - final recommendation pending work-up    We will continue to follow.     Annabelle Solorzano PA-C  Vascular Neurology    To page me or covering stroke neurology team member, click here: AMCOM   Choose \"On Call\" tab at top, then search dropdown box for \"Neurology Adult\", select location, press Enter, then look for stroke/neuro ICU/telestroke.  ______________________________________________________    Clinically Significant Risk Factors Present on Admission     Medications   Scheduled Meds    albuterol  2.5 mg Nebulization Q6H     artificial tears   Both Eyes Q8H     cefTRIAXone  2 g Intravenous Q12H     chlorhexidine  15 mL Mouth/Throat Q12H     heparin  1.3-2.6 mL Intracatheter Once in dialysis/CRRT     heparin  1.3-2.6 mL Intracatheter Once in dialysis/CRRT     methylPREDNISolone  62.5 mg Intravenous Q6H     multivitamins w/minerals  15 mL Per Feeding Tube Daily     pantoprazole  40 mg Per Feeding Tube QAM AC    Or     pantoprazole  40 mg Intravenous QAM AC       Infusion Meds    dextrose       CRRT replacement solution 0 mL/hr (11/14/22 1030)     epoprostenol 20 ng/kg/min (11/16/22 0746)     heparin 500 Units/hr (11/15/22 0734)     - MEDICATION INSTRUCTIONS -       - MEDICATION INSTRUCTIONS -       norepinephrine 0.14 mcg/kg/min (11/16/22 0635)     CRRT replacement solution 200 mL/hr at 11/15/22 1315     CRRT replacement solution 0 mL/kg/hr (11/14/22 1030)     propofol 60 mcg/kg/min (11/16/22 0626)     sodium chloride 20 mL/hr at 11/14/22 0725     vasopressin 2.4 Units/hr (11/16/22 0019)       PRN Meds  sodium chloride 0.9%, albuterol, calcium gluconate, calcium gluconate, dextrose, glucose **OR** dextrose **OR** glucagon, fentaNYL, fentaNYL, magnesium sulfate, - MEDICATION INSTRUCTIONS -, - MEDICATION INSTRUCTIONS -, metoprolol, " naloxone **OR** naloxone **OR** naloxone **OR** naloxone, ondansetron **OR** ondansetron, potassium chloride, prochlorperazine **OR** prochlorperazine **OR** prochlorperazine, senna-docusate **OR** senna-docusate, sodium phosphate, vecuronium, vecuronium       PHYSICAL EXAMINATION  Temp:  [95.7  F (35.4  C)-98.4  F (36.9  C)] 98.4  F (36.9  C)  Pulse:  [59-70] 69  Resp:  [0-25] 24  BP: (115-116)/(62-63) 115/63  MAP:  [62 mmHg-79 mmHg] 66 mmHg  Arterial Line BP: ()/(46-60) 91/52  FiO2 (%):  [60 %] 60 %  SpO2:  [94 %-100 %] 95 %      General Exam  General:  Lying in bed, intubated, sedated on 10 mcg/kg/min propofol  HEENT:  normocephalic/atraumatic, tubing in place  Pulmonary:  no respiratory distress on mechanical ventilation    Neuro Exam  Mental Status: not alert, no verbal response, no eye opening even to deep noxious   Cranial Nerves: PER sluggishly RL 2mm, no blinking to threat, does not grimace to noxious or follow any commands, face grossly symmetric with ET tube in place  Motor: no movement to b/l UE with noxious, some slight flexion to bilateral lower extremities with noxious  Reflexes:  toes down-going  Sensory:  See motor  Coordination:  Not able to assess  Station/Gait:  Not able to assess    Imaging  I personally reviewed all imaging; relevant findings per HPI.     Lab Results Data   CBC  Recent Labs   Lab 11/16/22  0147 11/15/22  1822 11/15/22  1132   WBC 53.5* 55.8* 60.0*   RBC 3.32* 3.40* 3.36*   HGB 11.2* 11.5* 11.4*   HCT 33.6* 34.5* 34.7*   PLT 89* 87* 95*     Basic Metabolic Panel    Recent Labs   Lab 11/16/22  0546 11/16/22  0147 11/15/22  1934    134 133   POTASSIUM 4.0 4.3 4.1   CHLORIDE 103 101 102   CO2 20 17* 20   BUN 32* 33* 32*   CR 1.21 1.28* 1.42*   * 165* 161*   KELLY 8.0* 8.3* 8.4*     Liver Panel  Recent Labs   Lab 11/16/22  0546 11/16/22  0147 11/15/22  1934 11/15/22  1007 11/15/22  0503 11/14/22  1020 11/14/22  0201 11/13/22  1701 11/13/22  0828   PROTTOTAL 5.6*  --    --   --  5.8*  --   --   --  6.0*   ALBUMIN 2.0* 2.0* 2.0*   < > 2.0*   < > 2.2*   < > 2.0*   BILITOTAL 2.8*  --   --   --  3.0*  --   --   --  2.8*   ALKPHOS 188*  --   --   --  172*  --  184*  --  127   AST 74*  --   --   --  62*  --   --   --  80*   ALT 41  --   --   --  33  --   --   --  36    < > = values in this interval not displayed.     INR    Recent Labs   Lab Test 11/14/22  1020 11/14/22  0201 11/13/22  1701   INR 1.50* 1.47* 1.79*      Lipid Profile    Recent Labs   Lab Test 04/20/20  1157 08/01/19  1500 01/08/19  0840 10/31/17  1037 03/01/16  0648   CHOL 161  --   --  134 199   HDL 42  --   --  57 72   LDL 81  --   --  58 111*   TRIG 192* 177* 135 92 82     A1C    Recent Labs   Lab Test 09/22/16  0741   A1C 4.8     Troponin    Recent Labs   Lab 11/12/22  1147   TROPONINIS 10          Data   I personally examined and evaluated the patient today. At the time of my evaluation and management the patient was in critical condition today due to cardiac arrest s/p hypothermia protocol and rewarming with concern of possible anoxic brain injury. I personally managed review of chart, medical record, meds, imaging, history, exam, and discussion with attending regarding plan and documentation. I spent a total of 45 minutes providing critical care services, evaluating the patient, directing care and reviewing laboratory values and radiologic reports.

## 2022-11-16 NOTE — PROGRESS NOTES
ICU Progress Note    Date of Service: 11/16/22    A/P:  57M cirrhosis, CARRILLO s/p liver txp (9/2016) admitted to ICU 11/12/22 after out of hospital PEA arrest and acute hypoxemic respiratory failure. Course c/b LORETTA requiring CRRT. Found to have Parainfluenza virus and Streptococcal bacteremia.      I have personally reviewed the daily labs, imaging studies, cultures and discussed the case with referring physician and consulting physicians.      Neuro  # Sedation for mechanical ventilation  # Post-arrest anoxic brain injury  - propofol gtt  - fentanyl IVP prn   - repeat NCHCT this AM   - neurocritical care c/s      Pulmonary  # Acute hypoxemic respiratory failure  # ARDS  # Bilateral pleural effusions  - vent settings below  - supine  - methylprednisolone 62.5mg q6h  - albuterol q4h   - consider thoracentesis pending clinical course - will repeat beside US today     Cardiac  # Out of hospital PEA arrest  # Cardiogenic shock  # Septic shock  # Therapeutic temperature management - rewarmed 11/14  - MAP > 65  - norepinephrine gtt  - vasopressin gtt     Renal  # LORETTA - in the setting of PEA arrest and shock state  - monitor UOP, Cr, I/O  - CRRT  - nephrology c/s       GI  # Protein calorie malnutrition   # CARRILLO s/p liver txp - 9/2016  - TF managed by RD   - holding tacrolimus, on admission level 3.6     Heme/Onc  # Leukocytosis - 2/2 sepsis and inflammatory response, profound increase 11/14, afebrile, however on CRRT  # Thrombocytopenia - in the setting of critical illness  - monitor CBC      ID  # Streptococcal bacteremia  # Parainfluenza virus   - CTX 2g q12h - CNS dosing given that we cannot r/o CNS infection  - monitor Cx data  - ID c/s      Endo  # Stress induced hyperglycemia   - monitor BG  - sliding scale insulin      PPX  1. DVT: SQH    2. VAP: HOB 30 degrees, chlorhexidine rinse  3. Stress Ulcer: PPI  4. Restraints: Nonviolent soft two point restraints required and necessary for patient safety and continued cares  and good effect as patient continues to pull at necessary lines, tubes despite education and distraction. Will readdress daily.   5. Wound care - per unit routine   6. Feeding - TF  7. Family updated.    Interval Hx:  NAEON. Pressor requirements improving.     Unable to obtain ROS 2/2 sedation/intubation.     /63 (BP Location: Left arm)   Pulse 69   Temp 98.4  F (36.9  C) (Esophageal)   Resp 24   Ht 1.829 m (6')   Wt 105.5 kg (232 lb 9.4 oz)   SpO2 95%   BMI 31.54 kg/m    Gen: supine, NAD  Neuro: sedated, pupils reactive, does not w/d to pain   HEENT: anicteric  Card: RRR  Pulm: coarse b/l  Abd: soft, non-distended  MSK: no edema, no acute joint abnormality  Skin: no obvious rash    Vent Mode: CMV/AC  (Continuous Mandatory Ventilation/ Assist Control)  FiO2 (%): 60 %  Resp Rate (Set): 24 breaths/min  Tidal Volume (Set, mL): 400 mL  PEEP (cm H2O): 14 cmH2O  Resp: 24        Intake/Output Summary (Last 24 hours) at 11/16/2022 0813  Last data filed at 11/16/2022 0600  Gross per 24 hour   Intake 3153.87 ml   Output 2695 ml   Net 458.87 ml       Labs: reviewed    Imaging: reviewed    Billing: Patient is critically ill. Total critical care time today, excluding procedures, was 45 minutes.    Ki Watts MD  Pulmonary Disease and Critical Care Medicine   [facility]

## 2022-11-16 NOTE — PROGRESS NOTES
[facility]    Infectious Disease Progress Note    Date of Service : 11/16/2022     Assessment:  1.Parainfluenza pneumonia in the setting of immunocompromise (liver transplantation on immunosuppressive medications), complicated by streptococcal pneumonia and sepsis with acute hypoxic respiratory failure requiring mechanical ventilation and LORETTA requiring CRRT. It is unclear whether he had meningitis.   2. Out of hospital cardiac arrest with 20 min CPR for PEA, however patient had gastric intubation en route for an unknown amount of time  3. New right pneumothorax  4. Marked leukocytosis is likely multifactorial.  Infection may be contributing. May have loculated infection given pleural effusions. However, suspect leukocytosis may be reactive related to organ ischemia   5. S/p liver transplantation for NAFLD related cirrhosis. On Tacrolimus  6. LORETTA on CRRT  7. Thrombocytopenia related to sepsis     Recommendations  1. Chest tube placement is planned  2. If sizeable pleural effusions, consider thoracentesis to assess for infection  3. Continue Ceftriaxone 2 grams Q12H  4. Remains on stress doses of steroids  5. Sedation is being weaned for further neurological assessment, CT head appeared stable     Tori Hernández MD    Interval History   Remains intubated, has developed a new right pneumothorax and is getting a chest tube placed shortly     Physical Exam   Temp: 99.7  F (37.6  C) Temp src: Esophageal BP: 115/63 Pulse: 76   Resp: 24 SpO2: 95 % O2 Device: Mechanical Ventilator    Vitals:    11/13/22 0600 11/14/22 0600 11/15/22 0400   Weight: 106.3 kg (234 lb 5.6 oz) 107 kg (235 lb 14.3 oz) 105.5 kg (232 lb 9.4 oz)     Vital Signs with Ranges  Temp:  [95.7  F (35.4  C)-99.7  F (37.6  C)] 99.7  F (37.6  C)  Pulse:  [59-76] 76  Resp:  [0-25] 24  BP: (115)/(63) 115/63  MAP:  [62 mmHg-79 mmHg] 66 mmHg  Arterial Line BP: ()/(47-60) 91/52  FiO2 (%):  [60 %-80 %] 80 %  SpO2:  [94 %-100 %] 95  %    Constitutional:  Sedated, intubated  Lungs: on vent  Cardiovascular: S1S2  Abdomen: soft  Skin: No rash  MS :  Edema and anasarca    Other:    Medications     dextrose       CRRT replacement solution 0 mL/hr (11/14/22 1030)     epoprostenol 20 ng/kg/min (11/16/22 1003)     heparin 500 Units/hr (11/16/22 1034)     - MEDICATION INSTRUCTIONS -       - MEDICATION INSTRUCTIONS -       norepinephrine 0.2 mcg/kg/min (11/16/22 0830)     CRRT replacement solution 200 mL/hr at 11/15/22 1315     CRRT replacement solution 0 mL/kg/hr (11/14/22 1030)     propofol Stopped (11/16/22 0910)     sodium chloride 20 mL/hr at 11/14/22 0725     vasopressin 2.4 Units/hr (11/16/22 0908)       albuterol  2.5 mg Nebulization Q6H     artificial tears   Both Eyes Q8H     cefTRIAXone  2 g Intravenous Q12H     chlorhexidine  15 mL Mouth/Throat Q12H     heparin  1.3-2.6 mL Intracatheter Once in dialysis/CRRT     heparin  1.3-2.6 mL Intracatheter Once in dialysis/CRRT     insulin aspart  1-6 Units Subcutaneous Q4H     methylPREDNISolone  62.5 mg Intravenous Q6H     multivitamins w/minerals  15 mL Per Feeding Tube Daily     pantoprazole  40 mg Per Feeding Tube QAM AC    Or     pantoprazole  40 mg Intravenous QAM AC       Data   All microbiology laboratory data reviewed.  Recent Labs   Lab Test 11/16/22  0952 11/16/22  0147 11/15/22  1822   WBC 55.4* 53.5* 55.8*   HGB 10.8* 11.2* 11.5*   HCT 31.5* 33.6* 34.5*    101* 102*   PLT 77* 89* 87*     Recent Labs   Lab Test 11/16/22  0952 11/16/22  0546 11/16/22 0147   CR 1.45* 1.21 1.28*       Microbiology  11/12 blood cx  Peripheral Blood    0 Result Notes  Culture Positive on the 1st day of incubation Abnormal         Streptococcus pneumoniae Panic     2 of 2 bottles  Sent to [place] [facility] for serotyping, report to follow.         Resulting Agency: [facility]      Susceptibility              Streptococcus pneumoniae       KARAN       Ceftriaxone (meningitis) 0.016 ug/mL Susceptible        Ceftriaxone (non-meningitis) 0.016 ug/mL Susceptible       Levofloxacin 1.5 ug/mL Susceptible       Meropenem 0.012 ug/mL Susceptible       Penicillin (meningitis) 0.016 ug/mL Susceptible       Penicillin (non-meningitis) 0.016 ug/mL Susceptible       Penicillin(oral) 0.016 ug/mL Susceptible       Vancomycin 0.50 ug/mL Susceptible                          Imaging  11/12 CT chest abdomen pelvis  EXAM: CT CHEST ABDOMEN PELVIS W/O CONTRAST  LOCATION: [facility]  DATE/TIME: 11/12/2022 2:27 PM     INDICATION: Cardiac arrest  COMPARISON: PET CT on 09/17/2020 and chest x-ray earlier today  TECHNIQUE: CT scan of the chest, abdomen, and pelvis was performed without IV contrast. Multiplanar reformats were obtained. Dose reduction techniques were used.   CONTRAST: None.     FINDINGS:   LUNGS AND PLEURA: Large right and moderate left pleural effusion and dense consolidation/atelectasis in the lung bases. The endotracheal tube is in the midtrachea. The central tracheobronchial tree is patent.     MEDIASTINUM/AXILLAE: Several small bilateral supraclavicular lymph nodes, indeterminate. No lymphadenopathy in the axillae or mediastinum. No thoracic aortic aneurysm. Mild generalized cardiomegaly. Small pericardial effusion.     CORONARY ARTERY CALCIFICATION: Moderate.     HEPATOBILIARY: Postoperative changes of disease donor liver transplant. Cholecystectomy. No definite focal masses in the hepatic allograft on noncontrast CT.     PANCREAS: No duct dilatation or peripancreatic changes. No definite masses on noncontrast CT.     SPLEEN: Stable mild splenomegaly.     ADRENAL GLANDS: Mild diffuse thickening of the adrenal glands.     KIDNEYS/BLADDER: Small nonobstructing bilateral renal calculi measuring 3-4 mm. No hydronephrosis or perinephric stranding bilaterally. No renal masses evident on noncontrast CT. Nixon catheter decompresses the urinary bladder.     BOWEL: NG tube is in the stomach. No small  bowel or colonic obstruction or inflammatory changes.     LYMPH NODES: No lymphadenopathy.     VASCULATURE: No abdominal aortic aneurysm.     PELVIC ORGANS: No pelvic masses.     OTHER: Moderate ascites. Mild anasarca. No free air or fluid collections. Right common femoral central venous catheter with tip in the mid IVC, at the level of the renal vein confluence.     MUSCULOSKELETAL: Old healed rib fracture deformities. No suspicious lesions in the bones.                                                                      IMPRESSION:  1.  Large right and moderate left pleural effusion and bibasilar dense atelectasis/consolidation.  2.  Moderate ascites.  3.  Stable appearance of the hepatic allograft on noncontrast CT.  4.  Stable mild splenomegaly.  5.  Mild generalized cardiac enlargement and small pericardial effusion.  6.  Numerous small supraclavicular lymph nodes are nonspecific and likely reactive        11/12 CT head  EXAM: CT HEAD W/O CONTRAST  LOCATION: [facility]  DATE/TIME: 11/12/2022 2:27 PM     INDICATION: Cardiac arrest; altered mental state  COMPARISON: None.  TECHNIQUE: Routine CT Head without IV contrast. Multiplanar reformats. Dose reduction techniques were used.     FINDINGS:  INTRACRANIAL CONTENTS: No intracranial hemorrhage, extraaxial collection, or mass effect.  No CT evidence of acute infarct. Mild presumed chronic small vessel ischemic changes. Mild generalized volume loss. No hydrocephalus. Corpus callosum is normal.   Position of the cerebral tonsils is satisfactory. Sella shows no acute abnormality. Vascular calcification.      VISUALIZED ORBITS/SINUSES/MASTOIDS: No intraorbital abnormality. Mild to moderate mucosal thickening scattered about the paranasal sinuses. Patient is intubated nasally and orally. Trace fluid mastoid air cells bilaterally.     BONES/SOFT TISSUES: Overall mineralization is satisfactory. No fracture of the calvarium or skull base is  noted. No swelling of the facial or scalp soft tissues.                                                                      IMPRESSION:  1.  No acute process intracranially.     2.  Patient is intubated nasally and orally.     3.  No intracranial mass, mass effect, or hemorrhage.     4.  Gray-white differentiation is satisfactory with no convincing evidence for cerebral edema.     5.  Partial opacification paranasal sinuses. This may be on an inflammatory basis

## 2022-11-16 NOTE — PROGRESS NOTES
Patient transported to and from CT this morning. RT present for entire transport. No issues during or after transport.    Guido Mclain, RT

## 2022-11-16 NOTE — PROCEDURES
[facility]    Procedure: Chest tube insertion    Date/Time: 11/16/2022 12:03 PM  Performed by: Ki Wtats MD  Authorized by: Ki Watts MD   Indications: pneumothorax      UNIVERSAL PROTOCOL   Site Marked: Yes  Prior Images Obtained and Reviewed:  Yes  Required items: Required blood products, implants, devices and special equipment available    Patient identity confirmed:  Arm band  Patient was reevaluated immediately before administering moderate or deep sedation or anesthesia  Confirmation Checklist:  Patient's identity using two indicators, relevant allergies, procedure was appropriate and matched the consent or emergent situation and correct equipment/implants were available  Time out: Immediately prior to the procedure a time out was called    Universal Protocol: the Joint Commission Universal Protocol was followed    Preparation: Patient was prepped and draped in usual sterile fashion    ESBL (mL):  2     ANESTHESIA    Anesthesia: See MAR for details      SEDATION  Patient Sedated: Yes    Sedation:  See MAR for details  Vital signs: Vital signs monitored during sedation      PROCEDURE DETAILS  Preparation: skin prepped with ChloraPrep  Placement location: right anterior  Tube size (Tristanian): 14.  Ultrasound guidance: no  Tension pneumothorax heard: no  Tube connected to: suction  Drainage characteristics: yellow  Dressing: 4x4 sterile gauze      PROCEDURE    Patient Tolerance:  Patient tolerated the procedure well with no immediate complications  Length of time physician/provider present for 1:1 monitoring during sedation: 10

## 2022-11-16 NOTE — CONSULTS
[facility]  WO Nurse Inpatient Assessment     Consulted for: Philtrum/Columella, bilateral buttock    Patient History (according to provider note(s):      57M cirrhosis, CARRILLO s/p liver txp (9/2016) admitted to ICU 11/12/22 after out of hospital PEA arrest and acute hypoxemic respiratory failure. Course c/b LORETTA requiring CRRT. Found to have Parainfluenza virus and Streptococcal bacteremia.     Areas Assessed:      Areas visualized during today's visit: Face and neck and Sacrum/coccyx    Pressure Injury Location: Philtrum/Columella    Last photo: 11/16/22  Philtrum/Columella    Right lateral nose    Wound type: Pressure Injury     Pressure Injury Stage: Deep Tissue Pressure Injury (DTPI), hospital acquired      This is a Medical Device Related Pressure Injury (MDRPI) due to ETT  Wound history/plan of care:  Wound noted before patient had been proned on 11/15 by respiratory. RT. RT had noted that ETAD was tight on patient and the NG was taped to the ETAD. RT had replaced the ETAD and placed a optifoam underneath as well as asked staff to secure NG tube with bridal. On WOC assessment of new ETAD q-tip width clearance under Etad and a optifoam dressing was under the ETAD on arrival.    Wound base: 100 % non-blanchable, maroon, purple and epidermis     Palpation of the wound bed: normal      Drainage: none     Description of drainage: none- there was bloody drainage but not from wound appeared to be from within nare     Measurements (length x width x depth, in cm) 1.7  x 2.5  x  0 cm. Also noted to have small nonblanhable epidermis on right nose that measures 0.7cm x 0.4cm x0Cm      Tunneling N/A     Undermining N/A  Periwound skin: Ecchymosis      Color: normal and consistent with surrounding tissue      Temperature: normal   Odor: none  Pain: no grimacing or signs of discomfort, none  Pain intervention prior to dressing change: patient tolerated well  Treatment goal: Heal  and  Protection  STATUS: initial assessment  Supplies ordered: supplies stored on unit and discussed with RN    Pressure Injury Location: Bilateral buttock    Last photo: 11/16/22    Wound type: Pressure Injury and Friction     Pressure Injury Stage: 1, hospital acquired      Wound history/plan of care:   Patient has tan nonblanchable tissue to bilateral buttock that appears more likely from friction of the two buttocks rubbing against one another at they mirror one another, this discoloration appears to be old and in the healing process. However there is nonblanchable reddened tissue laterally to the left buttock that appears new, possibly from linens under patient.    Wound description :  Left lateral buttock 7cm x 1cm x 0 cm Nonblanchable red epidermis.  Bilateral buttock mirrored 100 % non-blanchable and dark tan discolored epidermis that feels thin and dry 8.5cm x 2cm x 0 cm to right buttock and 9 cm x 1.5cm x 0 cm to left buttock     Palpation of the wound bed: normal      Drainage: none     Description of drainage: none     Measurements (length x width x depth, in cm): see above     Tunneling N/A     Undermining N/A  Periwound skin: Intact      Color: normal and consistent with surrounding tissue      Temperature: normal   Odor: none  Pain: absent and denies , none  Pain intervention prior to dressing change: patient tolerated well  Treatment goal: Heal  and Protection  STATUS: initial assessment  Supplies ordered: supplies stored on unit and discussed with RN      My PI Risk Assessment     Sensory Perception: 1 - Completely Limited     Moisture: 3 - Occasionally moist      Activity: 1 - Bedfast      Mobility: 1 - Completely immobile      Nutrition: 1 - Very poor     Friction/Shear: 1 - Problem     TOTAL: 8      Treatment Plan:     Columella/Philtrum/Nose wound(s): Every shift   1. Cleanse site with saline. Pat dry with gauze.  2. Ensure Etad has Q tip clearance and place cut strip of Mepilex 4x4 or Optifoam under  "ETAD to ensure offloading.    Bilateral buttock wounds: Every other day or PRN with soiling  1. Clean wound with saline or MicroKlenz Spray, pat dry  2. Wipe / \"clean\" the surrounding periwound tissue with skin prep (Cavilon No Sting Skin Prep #377877) and allow to dry. This will help protect periwound and help dressing adherence  3. Press a Mepilex Sacral the long way over bilateral buttock to not cover anus, making sure to conform nicely to skin curvatures.   4. Time and date dressing change  NOTE  Pressure Injury prevention (please order supplies if not in room)  1. Turn/reposition every 1-2 hrs  2.   Float heels off bed with use of pillows or if needed Heel Lift boots (Prevalon #[id number])  3.   If incontinent Cleanse with incontinent cleanser (Yara spray #[id number]) followed by skin barrier protectant (Critic Aid paste)  BID and after each incontinent episode  4.   Prevent sliding and shear by limiting HOB to 30 degrees or less unless contraindicated, use knee gatch first if not contraindicated  5.   Chair cushion pressure redistribution as needed (#[id number]): please limit sitting to an hour at a time  6.   Optimize nutrition   7.   PULSATE low air loss mattress     Orders: Reviewed    RECOMMEND PRIMARY TEAM ORDER: None, at this time  Education provided: importance of repositioning, plan of care, Moisture management, Hygiene and Off-loading pressure  Discussed plan of care with: Nurse  WOC nurse follow-up plan: 1-2x weekly  Notify WOC if wound(s) deteriorate.  Nursing to notify the Provider(s) and re-consult the WOC Nurse if new skin concern.    DATA:     Current support surface: Standard  Low air loss mattress with pulsation , ICU  Containment of urine/stool: Incontinent pad in bed  BMI: Body mass index is 31.54 kg/m .   Active diet order: Orders Placed This Encounter      NPO for Medical/Clinical Reasons Except for: Meds, Ice Chips     Output: I/O last 3 completed shifts:  In: 3403.53 [I.V.:2738.53; NG/GT:425]  Out: " 2942 [Other:2942]     Labs: Recent Labs   Lab 11/16/22  0546 11/16/22  0147 11/14/22  1141 11/14/22  1020   ALBUMIN 2.0* 2.0*   < > 2.1*   HGB  --  11.2*   < > 12.2*   INR  --   --   --  1.50*   WBC  --  53.5*   < > 58.9*    < > = values in this interval not displayed.     Pressure injury risk assessment:   Sensory Perception: 2-->very limited  Moisture: 3-->occasionally moist  Activity: 1-->bedfast  Mobility: 1-->completely immobile  Nutrition: 2-->probably inadequate  Friction and Shear: 2-->potential problem  Kareem Score: 11    Ashleigh Briggs [facility]   Dept. Pager: [phone]  Dept. Office Number: [phone]

## 2022-11-16 NOTE — PROGRESS NOTES
Renal Medicine Progress Note            Assessment/Plan:     Blanco Osborne is a 57-year-old male with PMH CARRILLO s/p liver transplant (9/2016) and cirrhosis admitted 11/12/2022 with out of hospital PEA arrest and acute hypoxemic respiratory failure.  Course complicated by parainfluenza virus, streptococcal bacteremia, and LORETTA requiring CRRT.     Anuric LORETTA  Due to ATN in setting of PEA arrest and persistent shock.  Urine output negligible. CRRT started for anuric ATN.   -CRRT  ----Volume goal net even, remains on pressors will keep net even for now   ---- BRENDA sandy   ---- clotting issues resolved with heparin  - heparin fixed rate  -Every 8 hours CRRT labs  -Renally dose meds    PEA arrest  Septic shock  Cardiogenic shock  Currently on Levophed and vasopressin.  Rewarmed 11/14. Concern for anoxic brain injury. Neuro crit assessing.     Lactic acidosis, resolved  Improved with CRRT.    ESLD due to CARRILLO s/p liver transplant   - hold tacrolimus  -Tac trough 3.2    Hypoxic respiratory failure  ARDS  Vent management per ICU.  High FiO2 requirements with net positive fluid balance despite CRRT.  Attempting net even on CRRT if able.    FEN  Electrolytes improved with CRRT. Swelling starting in legs but pressor requirements still high, trying for net even.       Dwayne Laboy MD   [facility]  Office: [phone]          Interval History:     - filter change last night   - CT scan this AM, filter required change again due to disconnection   - CT placed today   - continues to require 2 pressors   - stable vent settings   - no UOP          Medications and Allergies:       albuterol  2.5 mg Nebulization Q6H     artificial tears   Both Eyes Q8H     cefTRIAXone  2 g Intravenous Q12H     chlorhexidine  15 mL Mouth/Throat Q12H     heparin  1.3-2.6 mL Intracatheter Once in dialysis/CRRT     heparin  1.3-2.6 mL Intracatheter Once in dialysis/CRRT     insulin aspart  1-6 Units Subcutaneous Q4H      methylPREDNISolone  62.5 mg Intravenous Q6H     multivitamins w/minerals  15 mL Per Feeding Tube Daily     pantoprazole  40 mg Per Feeding Tube QAM AC    Or     pantoprazole  40 mg Intravenous QAM AC      No Known Allergies         Physical Exam:   Vitals were reviewed  /73   Pulse 65   Temp 96.8  F (36  C)   Resp 24   Ht 1.829 m (6')   Wt 105.5 kg (232 lb 9.4 oz)   SpO2 98%   BMI 31.54 kg/m      Wt Readings from Last 3 Encounters:   11/15/22 105.5 kg (232 lb 9.4 oz)   10/07/19 137.5 kg (303 lb 3.2 oz)   10/04/19 131.5 kg (290 lb)       Intake/Output Summary (Last 24 hours) at 11/14/2022 1136  Last data filed at 11/14/2022 1100  Gross per 24 hour   Intake 4097.12 ml   Output 2345 ml   Net 1752.12 ml       GENERAL APPEARANCE: intubated, sedated   HEENT:  ETT in place, slight rust color in tube  RESP: coarse bilaterally   CV: RRR  ABDOMEN: soft, nontender, nondistended   EXTREMITIES/SKIN: 1+ LE edema  NEURO:  sedated  LINES:  + gomez, right internal jugular dialysis catheter           Data:     BMP  Recent Labs   Lab 11/16/22  1256 11/16/22  0952 11/16/22  0949 11/16/22  0546 11/16/22  0147 11/15/22  1934   NA  --  135  --  134 134 133   POTASSIUM  --  3.9  --  4.0 4.3 4.1   CHLORIDE  --  102  --  103 101 102   KELLY  --  8.2*  --  8.0* 8.3* 8.4*   CO2  --  17*  --  20 17* 20   BUN  --  39*  --  32* 33* 32*   CR  --  1.45*  --  1.21 1.28* 1.42*   * 213* 193* 165* 165* 161*     CBC  Recent Labs   Lab 11/16/22  0952 11/16/22  0147 11/15/22  1822 11/15/22  1132   WBC 55.4* 53.5* 55.8* 60.0*   HGB 10.8* 11.2* 11.5* 11.4*   HCT 31.5* 33.6* 34.5* 34.7*    101* 102* 103*   PLT 77* 89* 87* 95*     Lab Results   Component Value Date    AST 74 (H) 11/16/2022    ALT 41 11/16/2022    ALKPHOS 188 (H) 11/16/2022    BILITOTAL 2.8 (H) 11/16/2022     Lab Results   Component Value Date    INR 1.50 (H) 11/14/2022       Attestation:  I have reviewed today's vital signs, notes, medications, labs and  imaging.    Dwayne Laboy MD  [facility] - Nephrology  Office: [phone]

## 2022-11-16 NOTE — PLAN OF CARE
Care provided from [id number]    Neuro: weaned sedation per neurocrit. Patient withdraws from pain slightly. PERRL  CV: SR with blood pressure supported with levo and vaso  Pulm: Remains intubated. FiO2 60% with PEEP 14. Little to no secretions  GI/: Nixon removed due to anuria and CRRT, Bladder scanned prior to prone. Hypoactive bowel sounds  CRRT: pulling net 0. Patient tolerating well.   Additional: wife updated via phone. Patient proned at 1500. Plan to get a CT tomorrow after supine

## 2022-11-16 NOTE — PROGRESS NOTES
Patient remains intubated and sedated, prone overnight, tolerating well, able to slowly titrate levophed down. Remains on CRRT, net zero, plan to take down this AM for CT scan. Filter changed around 10pm for unintended fluid gain, unable to clear, tolerated well. No replacements need for electrolytes. Head turned every two hours and arms and leg alternated up/out lines repositioned with turns.

## 2022-11-17 ENCOUNTER — APPOINTMENT (OUTPATIENT)
Dept: GENERAL RADIOLOGY | Facility: CLINIC | Age: 57
DRG: 004 | End: 2022-11-17
Attending: STUDENT IN AN ORGANIZED HEALTH CARE EDUCATION/TRAINING PROGRAM
Payer: COMMERCIAL

## 2022-11-17 ENCOUNTER — APPOINTMENT (OUTPATIENT)
Dept: MRI IMAGING | Facility: CLINIC | Age: 57
DRG: 004 | End: 2022-11-17
Attending: PHYSICIAN ASSISTANT
Payer: COMMERCIAL

## 2022-11-17 LAB
ALBUMIN SERPL-MCNC: 1.9 G/DL (ref 3.4–5)
ALBUMIN SERPL-MCNC: 1.9 G/DL (ref 3.4–5)
ALBUMIN SERPL-MCNC: 2 G/DL (ref 3.4–5)
ALBUMIN SERPL-MCNC: 2 G/DL (ref 3.4–5)
ALP SERPL-CCNC: 176 U/L (ref 40–150)
ALT SERPL W P-5'-P-CCNC: 46 U/L (ref 0–70)
ANION GAP SERPL CALCULATED.3IONS-SCNC: 12 MMOL/L (ref 3–14)
ANION GAP SERPL CALCULATED.3IONS-SCNC: 13 MMOL/L (ref 3–14)
ANION GAP SERPL CALCULATED.3IONS-SCNC: 14 MMOL/L (ref 3–14)
AST SERPL W P-5'-P-CCNC: 75 U/L (ref 0–45)
BACTERIA BLD CULT: NO GROWTH
BASE EXCESS BLDA CALC-SCNC: -2.3 MMOL/L (ref -9–1.8)
BASE EXCESS BLDA CALC-SCNC: -2.7 MMOL/L (ref -9–1.8)
BILIRUB DIRECT SERPL-MCNC: 1.7 MG/DL (ref 0–0.2)
BILIRUB SERPL-MCNC: 2.1 MG/DL (ref 0.2–1.3)
BUN SERPL-MCNC: 33 MG/DL (ref 7–30)
BUN SERPL-MCNC: 36 MG/DL (ref 7–30)
BUN SERPL-MCNC: 38 MG/DL (ref 7–30)
CA-I BLD-MCNC: 4.4 MG/DL (ref 4.4–5.2)
CA-I BLD-MCNC: 4.6 MG/DL (ref 4.4–5.2)
CA-I BLD-MCNC: 4.6 MG/DL (ref 4.4–5.2)
CALCIUM SERPL-MCNC: 8.4 MG/DL (ref 8.5–10.1)
CALCIUM SERPL-MCNC: 8.5 MG/DL (ref 8.5–10.1)
CALCIUM SERPL-MCNC: 8.5 MG/DL (ref 8.5–10.1)
CHLORIDE BLD-SCNC: 104 MMOL/L (ref 94–109)
CHLORIDE BLD-SCNC: 104 MMOL/L (ref 94–109)
CHLORIDE BLD-SCNC: 105 MMOL/L (ref 94–109)
CO2 SERPL-SCNC: 19 MMOL/L (ref 20–32)
CO2 SERPL-SCNC: 19 MMOL/L (ref 20–32)
CO2 SERPL-SCNC: 21 MMOL/L (ref 20–32)
CREAT SERPL-MCNC: 1.1 MG/DL (ref 0.66–1.25)
CREAT SERPL-MCNC: 1.12 MG/DL (ref 0.66–1.25)
CREAT SERPL-MCNC: 1.15 MG/DL (ref 0.66–1.25)
ERYTHROCYTE [DISTWIDTH] IN BLOOD BY AUTOMATED COUNT: 14.3 % (ref 10–15)
ERYTHROCYTE [DISTWIDTH] IN BLOOD BY AUTOMATED COUNT: 14.5 % (ref 10–15)
ERYTHROCYTE [DISTWIDTH] IN BLOOD BY AUTOMATED COUNT: 14.6 % (ref 10–15)
GFR SERPL CREATININE-BSD FRML MDRD: 74 ML/MIN/1.73M2
GFR SERPL CREATININE-BSD FRML MDRD: 76 ML/MIN/1.73M2
GFR SERPL CREATININE-BSD FRML MDRD: 78 ML/MIN/1.73M2
GLUCOSE BLD-MCNC: 178 MG/DL (ref 70–99)
GLUCOSE BLD-MCNC: 195 MG/DL (ref 70–99)
GLUCOSE BLD-MCNC: 212 MG/DL (ref 70–99)
GLUCOSE BLDC GLUCOMTR-MCNC: 162 MG/DL (ref 70–99)
GLUCOSE BLDC GLUCOMTR-MCNC: 168 MG/DL (ref 70–99)
GLUCOSE BLDC GLUCOMTR-MCNC: 173 MG/DL (ref 70–99)
GLUCOSE BLDC GLUCOMTR-MCNC: 176 MG/DL (ref 70–99)
GLUCOSE BLDC GLUCOMTR-MCNC: 178 MG/DL (ref 70–99)
GLUCOSE BLDC GLUCOMTR-MCNC: 187 MG/DL (ref 70–99)
HCO3 BLD-SCNC: 22 MMOL/L (ref 21–28)
HCO3 BLD-SCNC: 22 MMOL/L (ref 21–28)
HCT VFR BLD AUTO: 28.4 % (ref 40–53)
HCT VFR BLD AUTO: 29.3 % (ref 40–53)
HCT VFR BLD AUTO: 30 % (ref 40–53)
HGB BLD-MCNC: 10 G/DL (ref 13.3–17.7)
HGB BLD-MCNC: 10.1 G/DL (ref 13.3–17.7)
HGB BLD-MCNC: 9.5 G/DL (ref 13.3–17.7)
MAGNESIUM SERPL-MCNC: 2.3 MG/DL (ref 1.6–2.3)
MAGNESIUM SERPL-MCNC: 2.4 MG/DL (ref 1.6–2.3)
MAGNESIUM SERPL-MCNC: 2.5 MG/DL (ref 1.6–2.3)
MCH RBC QN AUTO: 32.9 PG (ref 26.5–33)
MCH RBC QN AUTO: 32.9 PG (ref 26.5–33)
MCH RBC QN AUTO: 33 PG (ref 26.5–33)
MCHC RBC AUTO-ENTMCNC: 33.3 G/DL (ref 31.5–36.5)
MCHC RBC AUTO-ENTMCNC: 33.5 G/DL (ref 31.5–36.5)
MCHC RBC AUTO-ENTMCNC: 34.5 G/DL (ref 31.5–36.5)
MCV RBC AUTO: 95 FL (ref 78–100)
MCV RBC AUTO: 99 FL (ref 78–100)
MCV RBC AUTO: 99 FL (ref 78–100)
O2/TOTAL GAS SETTING VFR VENT: 40 %
O2/TOTAL GAS SETTING VFR VENT: 50 %
PCO2 BLD: 34 MM HG (ref 35–45)
PCO2 BLD: 37 MM HG (ref 35–45)
PH BLD: 7.39 [PH] (ref 7.35–7.45)
PH BLD: 7.41 [PH] (ref 7.35–7.45)
PHOSPHATE SERPL-MCNC: 3.4 MG/DL (ref 2.5–4.5)
PHOSPHATE SERPL-MCNC: 3.4 MG/DL (ref 2.5–4.5)
PHOSPHATE SERPL-MCNC: 3.5 MG/DL (ref 2.5–4.5)
PLATELET # BLD AUTO: 54 10E3/UL (ref 150–450)
PLATELET # BLD AUTO: 58 10E3/UL (ref 150–450)
PLATELET # BLD AUTO: 63 10E3/UL (ref 150–450)
PO2 BLD: 70 MM HG (ref 80–105)
PO2 BLD: 85 MM HG (ref 80–105)
POTASSIUM BLD-SCNC: 3.8 MMOL/L (ref 3.4–5.3)
PROT SERPL-MCNC: 6 G/DL (ref 6.8–8.8)
RBC # BLD AUTO: 2.88 10E6/UL (ref 4.4–5.9)
RBC # BLD AUTO: 3.04 10E6/UL (ref 4.4–5.9)
RBC # BLD AUTO: 3.07 10E6/UL (ref 4.4–5.9)
SODIUM SERPL-SCNC: 136 MMOL/L (ref 133–144)
SODIUM SERPL-SCNC: 137 MMOL/L (ref 133–144)
SODIUM SERPL-SCNC: 138 MMOL/L (ref 133–144)
WBC # BLD AUTO: 32.5 10E3/UL (ref 4–11)
WBC # BLD AUTO: 35.9 10E3/UL (ref 4–11)
WBC # BLD AUTO: 37.6 10E3/UL (ref 4–11)

## 2022-11-17 PROCEDURE — 999N000157 HC STATISTIC RCP TIME EA 10 MIN

## 2022-11-17 PROCEDURE — 94640 AIRWAY INHALATION TREATMENT: CPT | Mod: 76

## 2022-11-17 PROCEDURE — 99291 CRITICAL CARE FIRST HOUR: CPT | Mod: FS | Performed by: PSYCHIATRY & NEUROLOGY

## 2022-11-17 PROCEDURE — 82248 BILIRUBIN DIRECT: CPT | Performed by: INTERNAL MEDICINE

## 2022-11-17 PROCEDURE — 250N000011 HC RX IP 250 OP 636: Performed by: STUDENT IN AN ORGANIZED HEALTH CARE EDUCATION/TRAINING PROGRAM

## 2022-11-17 PROCEDURE — 85027 COMPLETE CBC AUTOMATED: CPT | Performed by: STUDENT IN AN ORGANIZED HEALTH CARE EDUCATION/TRAINING PROGRAM

## 2022-11-17 PROCEDURE — 99291 CRITICAL CARE FIRST HOUR: CPT | Performed by: STUDENT IN AN ORGANIZED HEALTH CARE EDUCATION/TRAINING PROGRAM

## 2022-11-17 PROCEDURE — 82330 ASSAY OF CALCIUM: CPT | Performed by: STUDENT IN AN ORGANIZED HEALTH CARE EDUCATION/TRAINING PROGRAM

## 2022-11-17 PROCEDURE — 250N000009 HC RX 250: Performed by: INTERNAL MEDICINE

## 2022-11-17 PROCEDURE — 999N000009 HC STATISTIC AIRWAY CARE

## 2022-11-17 PROCEDURE — C9113 INJ PANTOPRAZOLE SODIUM, VIA: HCPCS | Performed by: STUDENT IN AN ORGANIZED HEALTH CARE EDUCATION/TRAINING PROGRAM

## 2022-11-17 PROCEDURE — 84100 ASSAY OF PHOSPHORUS: CPT | Performed by: STUDENT IN AN ORGANIZED HEALTH CARE EDUCATION/TRAINING PROGRAM

## 2022-11-17 PROCEDURE — A9585 GADOBUTROL INJECTION: HCPCS | Performed by: STUDENT IN AN ORGANIZED HEALTH CARE EDUCATION/TRAINING PROGRAM

## 2022-11-17 PROCEDURE — 85014 HEMATOCRIT: CPT | Performed by: STUDENT IN AN ORGANIZED HEALTH CARE EDUCATION/TRAINING PROGRAM

## 2022-11-17 PROCEDURE — 250N000009 HC RX 250: Performed by: STUDENT IN AN ORGANIZED HEALTH CARE EDUCATION/TRAINING PROGRAM

## 2022-11-17 PROCEDURE — 83735 ASSAY OF MAGNESIUM: CPT | Performed by: STUDENT IN AN ORGANIZED HEALTH CARE EDUCATION/TRAINING PROGRAM

## 2022-11-17 PROCEDURE — 82803 BLOOD GASES ANY COMBINATION: CPT | Performed by: INTERNAL MEDICINE

## 2022-11-17 PROCEDURE — 94640 AIRWAY INHALATION TREATMENT: CPT

## 2022-11-17 PROCEDURE — 250N000013 HC RX MED GY IP 250 OP 250 PS 637: Performed by: STUDENT IN AN ORGANIZED HEALTH CARE EDUCATION/TRAINING PROGRAM

## 2022-11-17 PROCEDURE — 99233 SBSQ HOSP IP/OBS HIGH 50: CPT | Performed by: SPECIALIST

## 2022-11-17 PROCEDURE — 82803 BLOOD GASES ANY COMBINATION: CPT | Performed by: STUDENT IN AN ORGANIZED HEALTH CARE EDUCATION/TRAINING PROGRAM

## 2022-11-17 PROCEDURE — 94003 VENT MGMT INPAT SUBQ DAY: CPT

## 2022-11-17 PROCEDURE — 255N000002 HC RX 255 OP 636: Performed by: STUDENT IN AN ORGANIZED HEALTH CARE EDUCATION/TRAINING PROGRAM

## 2022-11-17 PROCEDURE — 99232 SBSQ HOSP IP/OBS MODERATE 35: CPT | Performed by: STUDENT IN AN ORGANIZED HEALTH CARE EDUCATION/TRAINING PROGRAM

## 2022-11-17 PROCEDURE — 258N000003 HC RX IP 258 OP 636: Performed by: STUDENT IN AN ORGANIZED HEALTH CARE EDUCATION/TRAINING PROGRAM

## 2022-11-17 PROCEDURE — 200N000001 HC R&B ICU

## 2022-11-17 PROCEDURE — 71045 X-RAY EXAM CHEST 1 VIEW: CPT

## 2022-11-17 PROCEDURE — 94645 CONT INHLJ TX EACH ADDL HOUR: CPT

## 2022-11-17 PROCEDURE — 70553 MRI BRAIN STEM W/O & W/DYE: CPT

## 2022-11-17 RX ORDER — GADOBUTROL 604.72 MG/ML
10 INJECTION INTRAVENOUS ONCE
Status: COMPLETED | OUTPATIENT
Start: 2022-11-17 | End: 2022-11-17

## 2022-11-17 RX ORDER — METHYLPREDNISOLONE SODIUM SUCCINATE 125 MG/2ML
60 INJECTION, POWDER, LYOPHILIZED, FOR SOLUTION INTRAMUSCULAR; INTRAVENOUS EVERY 12 HOURS
Status: DISCONTINUED | OUTPATIENT
Start: 2022-11-17 | End: 2022-11-19

## 2022-11-17 RX ADMIN — CALCIUM CHLORIDE, MAGNESIUM CHLORIDE, SODIUM CHLORIDE, SODIUM BICARBONATE, POTASSIUM CHLORIDE AND SODIUM PHOSPHATE DIBASIC DIHYDRATE 5000 ML: 3.68; 3.05; 6.34; 3.09; .314; .187 INJECTION INTRAVENOUS at 19:39

## 2022-11-17 RX ADMIN — EPOPROSTENOL 20 NG/KG/MIN: 1.5 INJECTION, POWDER, LYOPHILIZED, FOR SOLUTION INTRAVENOUS at 08:04

## 2022-11-17 RX ADMIN — CALCIUM CHLORIDE, MAGNESIUM CHLORIDE, SODIUM CHLORIDE, SODIUM BICARBONATE, POTASSIUM CHLORIDE AND SODIUM PHOSPHATE DIBASIC DIHYDRATE 5000 ML: 3.68; 3.05; 6.34; 3.09; .314; .187 INJECTION INTRAVENOUS at 05:32

## 2022-11-17 RX ADMIN — CALCIUM CHLORIDE, MAGNESIUM CHLORIDE, DEXTROSE MONOHYDRATE, LACTIC ACID, SODIUM CHLORIDE, SODIUM BICARBONATE AND POTASSIUM CHLORIDE 5000 ML: 5.15; 2.03; 22; 5.4; 6.46; 3.09; .157 INJECTION INTRAVENOUS at 07:13

## 2022-11-17 RX ADMIN — GADOBUTROL 10 ML: 604.72 INJECTION INTRAVENOUS at 18:15

## 2022-11-17 RX ADMIN — PANTOPRAZOLE SODIUM 40 MG: 40 INJECTION, POWDER, FOR SOLUTION INTRAVENOUS at 08:08

## 2022-11-17 RX ADMIN — CALCIUM CHLORIDE, MAGNESIUM CHLORIDE, DEXTROSE MONOHYDRATE, LACTIC ACID, SODIUM CHLORIDE, SODIUM BICARBONATE AND POTASSIUM CHLORIDE 5000 ML: 5.15; 2.03; 22; 5.4; 6.46; 3.09; .157 INJECTION INTRAVENOUS at 19:41

## 2022-11-17 RX ADMIN — CEFTRIAXONE SODIUM 2 G: 2 INJECTION, POWDER, FOR SOLUTION INTRAMUSCULAR; INTRAVENOUS at 04:23

## 2022-11-17 RX ADMIN — METHYLPREDNISOLONE SODIUM SUCCINATE 62.5 MG: 125 INJECTION, POWDER, FOR SOLUTION INTRAMUSCULAR; INTRAVENOUS at 09:53

## 2022-11-17 RX ADMIN — INSULIN ASPART 1 UNITS: 100 INJECTION, SOLUTION INTRAVENOUS; SUBCUTANEOUS at 04:28

## 2022-11-17 RX ADMIN — CALCIUM CHLORIDE, MAGNESIUM CHLORIDE, SODIUM CHLORIDE, SODIUM BICARBONATE, POTASSIUM CHLORIDE AND SODIUM PHOSPHATE DIBASIC DIHYDRATE 5000 ML: 3.68; 3.05; 6.34; 3.09; .314; .187 INJECTION INTRAVENOUS at 09:53

## 2022-11-17 RX ADMIN — MINERAL OIL, PETROLATUM: 425; 573 OINTMENT OPHTHALMIC at 08:14

## 2022-11-17 RX ADMIN — METHYLPREDNISOLONE SODIUM SUCCINATE 62.5 MG: 125 INJECTION, POWDER, FOR SOLUTION INTRAMUSCULAR; INTRAVENOUS at 04:23

## 2022-11-17 RX ADMIN — MINERAL OIL, PETROLATUM: 425; 573 OINTMENT OPHTHALMIC at 00:16

## 2022-11-17 RX ADMIN — CALCIUM CHLORIDE, MAGNESIUM CHLORIDE, DEXTROSE MONOHYDRATE, LACTIC ACID, SODIUM CHLORIDE, SODIUM BICARBONATE AND POTASSIUM CHLORIDE: 5.15; 2.03; 22; 5.4; 6.46; 3.09; .157 INJECTION INTRAVENOUS at 11:59

## 2022-11-17 RX ADMIN — INSULIN ASPART 1 UNITS: 100 INJECTION, SOLUTION INTRAVENOUS; SUBCUTANEOUS at 00:16

## 2022-11-17 RX ADMIN — INSULIN ASPART 1 UNITS: 100 INJECTION, SOLUTION INTRAVENOUS; SUBCUTANEOUS at 20:45

## 2022-11-17 RX ADMIN — CEFTRIAXONE SODIUM 2 G: 2 INJECTION, POWDER, FOR SOLUTION INTRAMUSCULAR; INTRAVENOUS at 16:15

## 2022-11-17 RX ADMIN — CALCIUM CHLORIDE, MAGNESIUM CHLORIDE, DEXTROSE MONOHYDRATE, LACTIC ACID, SODIUM CHLORIDE, SODIUM BICARBONATE AND POTASSIUM CHLORIDE 5000 ML: 5.15; 2.03; 22; 5.4; 6.46; 3.09; .157 INJECTION INTRAVENOUS at 13:19

## 2022-11-17 RX ADMIN — CHLORHEXIDINE GLUCONATE 0.12% ORAL RINSE 15 ML: 1.2 LIQUID ORAL at 20:45

## 2022-11-17 RX ADMIN — CHLORHEXIDINE GLUCONATE 0.12% ORAL RINSE 15 ML: 1.2 LIQUID ORAL at 08:08

## 2022-11-17 RX ADMIN — METHYLPREDNISOLONE SODIUM SUCCINATE 62.5 MG: 125 INJECTION, POWDER, FOR SOLUTION INTRAMUSCULAR; INTRAVENOUS at 22:08

## 2022-11-17 RX ADMIN — SODIUM CHLORIDE: 9 INJECTION, SOLUTION INTRAVENOUS at 01:16

## 2022-11-17 RX ADMIN — CALCIUM CHLORIDE, MAGNESIUM CHLORIDE, SODIUM CHLORIDE, SODIUM BICARBONATE, POTASSIUM CHLORIDE AND SODIUM PHOSPHATE DIBASIC DIHYDRATE 5000 ML: 3.68; 3.05; 6.34; 3.09; .314; .187 INJECTION INTRAVENOUS at 01:00

## 2022-11-17 RX ADMIN — INSULIN ASPART 1 UNITS: 100 INJECTION, SOLUTION INTRAVENOUS; SUBCUTANEOUS at 12:14

## 2022-11-17 RX ADMIN — ALBUTEROL SULFATE 2.5 MG: 2.5 SOLUTION RESPIRATORY (INHALATION) at 14:35

## 2022-11-17 RX ADMIN — EPOPROSTENOL 20 NG/KG/MIN: 1.5 INJECTION, POWDER, LYOPHILIZED, FOR SOLUTION INTRAVENOUS at 02:18

## 2022-11-17 RX ADMIN — CALCIUM CHLORIDE, MAGNESIUM CHLORIDE, DEXTROSE MONOHYDRATE, LACTIC ACID, SODIUM CHLORIDE, SODIUM BICARBONATE AND POTASSIUM CHLORIDE: 5.15; 2.03; 22; 5.4; 6.46; 3.09; .157 INJECTION INTRAVENOUS at 19:41

## 2022-11-17 RX ADMIN — CALCIUM CHLORIDE, MAGNESIUM CHLORIDE, DEXTROSE MONOHYDRATE, LACTIC ACID, SODIUM CHLORIDE, SODIUM BICARBONATE AND POTASSIUM CHLORIDE 5000 ML: 5.15; 2.03; 22; 5.4; 6.46; 3.09; .157 INJECTION INTRAVENOUS at 02:01

## 2022-11-17 RX ADMIN — ALBUTEROL SULFATE 2.5 MG: 2.5 SOLUTION RESPIRATORY (INHALATION) at 07:14

## 2022-11-17 RX ADMIN — MINERAL OIL, PETROLATUM: 425; 573 OINTMENT OPHTHALMIC at 15:16

## 2022-11-17 RX ADMIN — CALCIUM CHLORIDE, MAGNESIUM CHLORIDE, SODIUM CHLORIDE, SODIUM BICARBONATE, POTASSIUM CHLORIDE AND SODIUM PHOSPHATE DIBASIC DIHYDRATE 5000 ML: 3.68; 3.05; 6.34; 3.09; .314; .187 INJECTION INTRAVENOUS at 16:40

## 2022-11-17 RX ADMIN — ALBUTEROL SULFATE 2.5 MG: 2.5 SOLUTION RESPIRATORY (INHALATION) at 19:37

## 2022-11-17 RX ADMIN — CALCIUM CHLORIDE, MAGNESIUM CHLORIDE, SODIUM CHLORIDE, SODIUM BICARBONATE, POTASSIUM CHLORIDE AND SODIUM PHOSPHATE DIBASIC DIHYDRATE 5000 ML: 3.68; 3.05; 6.34; 3.09; .314; .187 INJECTION INTRAVENOUS at 03:18

## 2022-11-17 RX ADMIN — CALCIUM CHLORIDE, MAGNESIUM CHLORIDE, SODIUM CHLORIDE, SODIUM BICARBONATE, POTASSIUM CHLORIDE AND SODIUM PHOSPHATE DIBASIC DIHYDRATE 5000 ML: 3.68; 3.05; 6.34; 3.09; .314; .187 INJECTION INTRAVENOUS at 22:21

## 2022-11-17 RX ADMIN — CALCIUM CHLORIDE, MAGNESIUM CHLORIDE, SODIUM CHLORIDE, SODIUM BICARBONATE, POTASSIUM CHLORIDE AND SODIUM PHOSPHATE DIBASIC DIHYDRATE 5000 ML: 3.68; 3.05; 6.34; 3.09; .314; .187 INJECTION INTRAVENOUS at 11:59

## 2022-11-17 RX ADMIN — Medication 15 ML: at 08:08

## 2022-11-17 RX ADMIN — INSULIN ASPART 1 UNITS: 100 INJECTION, SOLUTION INTRAVENOUS; SUBCUTANEOUS at 15:14

## 2022-11-17 RX ADMIN — INSULIN ASPART 1 UNITS: 100 INJECTION, SOLUTION INTRAVENOUS; SUBCUTANEOUS at 08:19

## 2022-11-17 ASSESSMENT — ACTIVITIES OF DAILY LIVING (ADL)
ADLS_ACUITY_SCORE: 51
ADLS_ACUITY_SCORE: 53
ADLS_ACUITY_SCORE: 53
ADLS_ACUITY_SCORE: 51
ADLS_ACUITY_SCORE: 53
ADLS_ACUITY_SCORE: 51
ADLS_ACUITY_SCORE: 51
ADLS_ACUITY_SCORE: 47
ADLS_ACUITY_SCORE: 51
ADLS_ACUITY_SCORE: 51

## 2022-11-17 NOTE — PROGRESS NOTES
[facility]    Neurocritical care progress Note    Interval EventsHad chest tube placed for R pneumothorax 11/16/22. He was able to be weaned off sedation and vasopressin. Still on Levophed. Planning for MRI for neuroprognostication today if medically stable.    HPI Summary  Blanco Osborne is a 56 yo Male with pertinent past medical history of nonalcoholic fatty liver disease/cirrhosis s/p transplant 09/2016.    He presented to the emergency department 11/12/2022 following cardiac arrest after calling EMS himself due to shortness of breath and weakness.  He had been complaining to family of fever, fatigue, cough, confusion x1 week prior (tested positive on admission for parainfluenza and Streptococcal bacteremia. ).  On EMS arrival he was found in PEA s/p epi and bicarb achieved ROSC.  CTh unremarkable.  EEG with severe diffuse nonspecific encephalopathy. Hypothermia protocol initiated.  Began rewarming 11/13/2022.  Achieved normothermia 11/14/2022.     CTH 11/16/22 for stability while on sedation limiting exam without acute pathology. Able to be weaned off propofol 11/16/22. Continued on pressors. He     Evaluation Summarized  CTH: 11/16/22: Chronic SVID, negative for acute pathology  CT head 11/12: no evidence of cerebral edema or loss of gray-white differentiation   EEG: No epileptiform discharges or electrographic seizures were recorded; Findings are consistent with severe diffuse nonspecific encephalopathy      Impression  Cardiac arrest, s/p hypothermic protocol, rewarmed to normothermia 11/14/2022, concern of possible underlying anoxic brain injury    Acute hypoxemic respiratory failure s/p mechanical ventilation with parainfluenza and Streptococcal bacteremia, negative meningeal signs    CARRILLO s/p liver transplant with possible shock liver and LORETTA due to cardiogenic and septic shock, delayed clearance of sedatives     Plan  -Q2 hour neurochecks  -propofol was stopped, expect  "some ongoing sedation still given liver and renal insufficiency  -currently on zosyn and vancomycin  -Remains on levophed, vasopressin stopped  -STAT head CT for change in neurological excam  -MRI brain with and without contrast 72 hours post rewarming (today) to assess for anoxic brain injury if medically stable  -euthermia (since 11/14/22), eunatremia (Na 137 this AM, stable), euglycemia (glucose 176, goal 120-180)    Patient Follow-up    - final recommendation pending work-up    We will continue to follow.     Annabelle Solorzano PA-C  Vascular Neurology    To page me or covering stroke neurology team member, click here: AMCOM   Choose \"On Call\" tab at top, then search dropdown box for \"Neurology Adult\", select location, press Enter, then look for stroke/neuro ICU/telestroke.  ______________________________________________________    Clinically Significant Risk Factors Present on Admission     Medications   Scheduled Meds    albuterol  2.5 mg Nebulization Q6H     artificial tears   Both Eyes Q8H     cefTRIAXone  2 g Intravenous Q12H     chlorhexidine  15 mL Mouth/Throat Q12H     insulin aspart  1-6 Units Subcutaneous Q4H     methylPREDNISolone  62.5 mg Intravenous Q12H     multivitamins w/minerals  15 mL Per Feeding Tube Daily     pantoprazole  40 mg Per Feeding Tube QAM AC    Or     pantoprazole  40 mg Intravenous QAM AC       Infusion Meds    dextrose       CRRT replacement solution 0 mL/hr (11/14/22 1030)     epoprostenol Stopped (11/17/22 1032)     heparin 500 Units/hr (11/17/22 0729)     - MEDICATION INSTRUCTIONS -       - MEDICATION INSTRUCTIONS -       norepinephrine 0.1 mcg/kg/min (11/17/22 1033)     CRRT replacement solution 200 mL/hr at 11/17/22 1159     CRRT replacement solution 0 mL/kg/hr (11/14/22 1030)     propofol Stopped (11/16/22 0910)     sodium chloride 20 mL/hr at 11/17/22 0116     vasopressin Stopped (11/17/22 0222)       PRN Meds  sodium chloride 0.9%, albuterol, calcium gluconate, calcium " gluconate, dextrose, glucose **OR** dextrose **OR** glucagon, fentaNYL, fentaNYL, magnesium sulfate, - MEDICATION INSTRUCTIONS -, - MEDICATION INSTRUCTIONS -, metoprolol, naloxone **OR** naloxone **OR** naloxone **OR** naloxone, ondansetron **OR** ondansetron, potassium chloride, prochlorperazine **OR** prochlorperazine **OR** prochlorperazine, senna-docusate **OR** senna-docusate, sodium phosphate, vecuronium, vecuronium       PHYSICAL EXAMINATION  Temp:  [96.1  F (35.6  C)-99  F (37.2  C)] 96.8  F (36  C)  Pulse:  [58-78] 68  Resp:  [8-35] 27  MAP:  [65 mmHg-100 mmHg] 90 mmHg  Arterial Line BP: ()/(50-78) 133/66  FiO2 (%):  [40 %-50 %] 50 %  SpO2:  [94 %-100 %] 99 %      General Exam  General:  Lying in bed, intubated, no longer on sedation  HEENT:  normocephalic/atraumatic, tubing in place  Pulmonary:  no respiratory distress on mechanical ventilation    Neuro Exam  Mental Status: not alert, no verbal response, eyes open to noxiou  Cranial Nerves: PER sluggishly RL 2mm, no blinking to threat, grimaces to noxious, does not follow any commands, face grossly symmetric with ET tube in place  Motor: withdrawals to noxious b/l upper and lower extremities appears symmetric, negative meningeal signs, no appreciated rigidity   Reflexes:  toes down-going  Sensory:  See motor  Coordination:  Not able to assess  Station/Gait:  Not able to assess    Imaging  I personally reviewed all imaging; relevant findings per HPI.     Lab Results Data   CBC  Recent Labs   Lab 11/17/22  1006 11/17/22  0157 11/16/22  1831   WBC 37.6* 35.9* 39.5*   RBC 3.07* 3.04* 3.01*   HGB 10.1* 10.0* 9.9*   HCT 29.3* 30.0* 29.4*   PLT 63* 58* 63*     Basic Metabolic Panel    Recent Labs   Lab 11/17/22  1208 11/17/22  1006 11/17/22  0819 11/17/22  0418 11/17/22  0157 11/16/22  2136 11/16/22  1831   NA  --  137  --   --  136  --  135   POTASSIUM  --  3.8  --   --  3.8  --  3.8   CHLORIDE  --  104  --   --  104  --  104   CO2  --  19*  --   --  19*   --  17*   BUN  --  33*  --   --  36*  --  35*   CR  --  1.10  --   --  1.12  --  1.27*   * 178* 168*   < > 195*   < > 224*   KELLY  --  8.5  --   --  8.5  --  8.5    < > = values in this interval not displayed.     Liver Panel  Recent Labs   Lab 11/17/22  1006 11/17/22  0416 11/17/22  0157 11/16/22  0952 11/16/22  0546 11/15/22  1007 11/15/22  0503   PROTTOTAL  --  6.0*  --   --  5.6*  --  5.8*   ALBUMIN 2.0* 1.9* 2.0*   < > 2.0*   < > 2.0*   BILITOTAL  --  2.1*  --   --  2.8*  --  3.0*   ALKPHOS  --  176*  --   --  188*  --  172*   AST  --  75*  --   --  74*  --  62*   ALT  --  46  --   --  41  --  33    < > = values in this interval not displayed.     INR    Recent Labs   Lab Test 11/14/22  1020 11/14/22  0201 11/13/22  1701   INR 1.50* 1.47* 1.79*      Lipid Profile    Recent Labs   Lab Test 04/20/20  1157 08/01/19  1500 01/08/19  0840 10/31/17  1037 03/01/16  0648   CHOL 161  --   --  134 199   HDL 42  --   --  57 72   LDL 81  --   --  58 111*   TRIG 192* 177* 135 92 82     A1C    Recent Labs   Lab Test 09/22/16  0741   A1C 4.8     Troponin    Recent Labs   Lab 11/12/22  1147   TROPONINIS 10          Data   I personally examined and evaluated the patient today. At the time of my evaluation and management the patient was in critical condition today due to cardiac arrest s/p hypothermia protocol and rewarming with concern of possible anoxic brain injury. I personally managed review of chart, medical record, meds, imaging, history, exam, and discussion with attending regarding plan and documentation. I spent a total of 35 minutes providing critical care services, evaluating the patient, directing care and reviewing laboratory values and radiologic reports.

## 2022-11-17 NOTE — PROGRESS NOTES
[facility] ICU RESPIRATORY NOTE           Date of Admission: 11/12/2022     Date of Intubation (most recent): 11/12/2022     Reason for Mechanical Ventilation: Respiratory failure     Number of Days on Mechanical Ventilation: 6     Met Criteria for Spontaneous Breathing Trial: No     Reason for No Spontaneous Breathing Trial: Per MD     Significant Events Today: None overnight     ABG Results:   Recent Labs   Lab 11/16/22  1256 11/16/22  0952 11/15/22  1602 11/15/22  0835   PH 7.35 7.31* 7.30* 7.29*   PCO2 37 39 40 44   PO2 105 72* 99 83   HCO3 20* 20* 20* 21   O2PER 50 60 60 60     Vent Mode: CMV/AC  (Continuous Mandatory Ventilation/ Assist Control)  FiO2 (%): 40 %  Resp Rate (Set): 24 breaths/min  Tidal Volume (Set, mL): 400 mL  PEEP (cm H2O): 12 cmH2O  Resp: 24    YSABEL Brantley, RRT

## 2022-11-17 NOTE — PLAN OF CARE
Neuro:Sedated off at 0900--remains off. Patient not opening eyes, 1 episode of spontaneous movement in RLE. Withdraws on all 4 extremities. PERRL.    Last NIHSS score: NA    Cardiovascular: Tele: SR with occasional PVC. Titrating pressor needs down as able while maintaining MAP. Decreased pressor needs from start of shift.      Pulmonary: Lungs clear this a.m., coarse this afternoon. Intermittent periods of inspiratory/expiratory wheeze. Chest tube inserted today. No crepitus noted in chest wall. Peep decreased and FiO2 decreased today. Supined at 0800 and remains supine.    : Anuric. On CRRT    GI: No BM. Hypoactive bowel. TF at trickle rate.      Skin: Wounds on nares/ upper lip area and buttock area. Foam dressings to sites.     Mobility: Bedrest.    Family: Brother Dylan Thakur at bedside and supportive/attentive to patient.

## 2022-11-17 NOTE — PROGRESS NOTES
Renal Medicine Progress Note            Assessment/Plan:     Blanco Osborne is a 57-year-old male with PMH CARRILLO s/p liver transplant (9/2016) and cirrhosis admitted 11/12/2022 with out of hospital PEA arrest and acute hypoxemic respiratory failure.  Course complicated by parainfluenza virus, streptococcal bacteremia, and LORETTA requiring CRRT.     Anuric LORETTA  Due to ATN in setting of PEA arrest and persistent shock.  Urine output negligible. CRRT started for anuric ATN.   -CRRT  ----Volume goal net negative 0-100cc/h   ---- BRENDA sandy   ---- clotting issues resolved with heparin  - heparin fixed rate  -Every 8 hours CRRT labs  -Renally dose meds    PEA arrest  Septic shock, strep pneumo bacteremia  Cardiogenic shock  Currently on Levophed and vasopressin.  Rewarmed 11/14. Concern for anoxic brain injury. Neuro crit assessing.     Lactic acidosis, resolved  Improved with CRRT.    ESLD due to CARRILLO s/p liver transplant   - hold tacrolimus  -Tac trough 3.2    Hypoxic respiratory failure  ARDS  Vent management per ICU.  High FiO2 requirements with net positive fluid balance despite CRRT.  Attempting net even on CRRT if able.    R Pneumothorax   Bilateral pleural effusions  CT placed 11/16 with large output.     FEN  Electrolytes improved with CRRT. Starting UF net negative.       Dwayne Laboy MD   [facility]  Office: [phone]          Interval History:     - bladder scan showing 500cc, but 5ml on straight cath.   - plan for MRI today   - has LE edema, vent setting improved from earlier in week   - wife and brother at bedside   - no issues with CRRT line or filter clotting          Medications and Allergies:       albuterol  2.5 mg Nebulization Q6H     artificial tears   Both Eyes Q8H     cefTRIAXone  2 g Intravenous Q12H     chlorhexidine  15 mL Mouth/Throat Q12H     insulin aspart  1-6 Units Subcutaneous Q4H     methylPREDNISolone  62.5 mg Intravenous Q12H     multivitamins w/minerals  15 mL Per  Feeding Tube Daily     pantoprazole  40 mg Per Feeding Tube QAM AC    Or     pantoprazole  40 mg Intravenous QAM AC      No Known Allergies         Physical Exam:   Vitals were reviewed  /73   Pulse 61   Temp (!) 96.4  F (35.8  C)   Resp 24   Ht 1.829 m (6')   Wt 102.8 kg (226 lb 10.1 oz)   SpO2 96%   BMI 30.74 kg/m      Wt Readings from Last 3 Encounters:   11/17/22 102.8 kg (226 lb 10.1 oz)   10/07/19 137.5 kg (303 lb 3.2 oz)   10/04/19 131.5 kg (290 lb)       Intake/Output Summary (Last 24 hours) at 11/14/2022 1136  Last data filed at 11/14/2022 1100  Gross per 24 hour   Intake 4097.12 ml   Output 2345 ml   Net 1752.12 ml       GENERAL APPEARANCE: intubated, sedated   HEENT:  ETT in place, slight rust color in tube  RESP: coarse bilaterally   CV: RRR  ABDOMEN: soft, nontender, nondistended   EXTREMITIES/SKIN: 1-2+ LE edema  NEURO:  sedated  LINES:  + gomez, right internal jugular dialysis catheter           Data:     BMP  Recent Labs   Lab 11/17/22  1208 11/17/22  1006 11/17/22  0819 11/17/22  0418 11/17/22  0157 11/16/22  2136 11/16/22  1831 11/16/22  1256 11/16/22  0952   NA  --  137  --   --  136  --  135  --  135   POTASSIUM  --  3.8  --   --  3.8  --  3.8  --  3.9   CHLORIDE  --  104  --   --  104  --  104  --  102   KELLY  --  8.5  --   --  8.5  --  8.5  --  8.2*   CO2  --  19*  --   --  19*  --  17*  --  17*   BUN  --  33*  --   --  36*  --  35*  --  39*   CR  --  1.10  --   --  1.12  --  1.27*  --  1.45*   * 178* 168* 162* 195*   < > 224*   < > 213*    < > = values in this interval not displayed.     CBC  Recent Labs   Lab 11/17/22  1006 11/17/22  0157 11/16/22  1831 11/16/22  0952   WBC 37.6* 35.9* 39.5* 55.4*   HGB 10.1* 10.0* 9.9* 10.8*   HCT 29.3* 30.0* 29.4* 31.5*   MCV 95 99 98 100   PLT 63* 58* 63* 77*     Lab Results   Component Value Date    AST 75 (H) 11/17/2022    ALT 46 11/17/2022    ALKPHOS 176 (H) 11/17/2022    BILITOTAL 2.1 (H) 11/17/2022     Lab Results   Component Value  Date    INR 1.50 (H) 11/14/2022       Attestation:  I have reviewed today's vital signs, notes, medications, labs and imaging.    Dwayne Laboy MD  [facility] - Nephrology  Office: [phone]

## 2022-11-17 NOTE — PROGRESS NOTES
ICU Progress Note    Date of Service: 11/17/22    A/P:  57M cirrhosis, CARRILLO s/p liver txp (9/2016) admitted to ICU 11/12/22 after out of hospital PEA arrest and acute hypoxemic respiratory failure. Course c/b LORETTA requiring CRRT. Found to have Parainfluenza virus and Streptococcal bacteremia.      I have personally reviewed the daily labs, imaging studies, cultures and discussed the case with referring physician and consulting physicians.      Neuro  # Sedation for mechanical ventilation  # Post-arrest anoxic brain injury  - monitor propofol gtt  - fentanyl IVP prn   - plan for MRI today  - neurocritical care c/s      Pulmonary  # Acute hypoxemic respiratory failure  # ARDS  # Bilateral pleural effusions  - vent settings below  - supine  - methylprednisolone 62.5mg taper to q12h  - albuterol q4h   - trial off inhaled epoprostenol (cannot be on this during MRI)  - repeat CXR      Cardiac  # Out of hospital PEA arrest  # Cardiogenic shock  # Septic shock  # Therapeutic temperature management - rewarmed 11/14  - MAP > 65  - norepinephrine gtt     Renal  # LORETTA - in the setting of PEA arrest and shock state  - monitor UOP, Cr, I/O  - CRRT  - nephrology c/s       GI  # Protein calorie malnutrition   # CARRILLO s/p liver txp - 9/2016  - TF managed by RD   - holding tacrolimus, on admission level 3.6     Heme/Onc  # Leukocytosis - 2/2 sepsis and inflammatory response, profound increase 11/14, afebrile, however on CRRT  # Thrombocytopenia - in the setting of critical illness  - monitor CBC      ID  # Streptococcal bacteremia  # Parainfluenza virus   - CTX 2g q12h - CNS dosing given that we cannot r/o CNS infection  - monitor Cx data      Endo  # Stress induced hyperglycemia   - monitor BG  - sliding scale insulin      PPX  1. DVT: SQH    2. VAP: HOB 30 degrees, chlorhexidine rinse  3. Stress Ulcer: PPI  4. Restraints: Nonviolent soft two point restraints required and necessary for patient safety and continued cares and good  effect as patient continues to pull at necessary lines, tubes despite education and distraction. Will readdress daily.   5. Wound care - per unit routine   6. Feeding - TF  7. Family updated at bedside.    Interval Hx:  Chest tube placement yesterday for R PTX, CXR improved post placement. Pressors weaning down. Off sedation since yesterday. P:F 175.     Unable to obtain ROS 2/2 sedation/intubation.     /73   Pulse 71   Temp 97  F (36.1  C)   Resp 24   Ht 1.829 m (6')   Wt 102.8 kg (226 lb 10.1 oz)   SpO2 97%   BMI 30.74 kg/m    Gen: supine, NAD  Neuro: sedated, pupils reactive  HEENT: anicteric  Card: RRR  Pulm: clear b/l, R anterior chest tube C/D/I, air-leak present   Abd: soft, non-distended  MSK: no edema, no acute joint abnormality  Skin: no obvious rash    Vent Mode: CMV/AC  (Continuous Mandatory Ventilation/ Assist Control)  FiO2 (%): 50 %  Resp Rate (Set): 24 breaths/min  Tidal Volume (Set, mL): 400 mL  PEEP (cm H2O): 12 cmH2O  Resp: 24        Intake/Output Summary (Last 24 hours) at 11/17/2022 1024  Last data filed at 11/17/2022 1000  Gross per 24 hour   Intake 1915.74 ml   Output 3275 ml   Net -1359.26 ml       Labs: reviewed    Imaging: reviewed    Billing: Patient is critically ill. Total critical care time today, excluding procedures, was 45 minutes.    Ki Watts MD  Pulmonary Disease and Critical Care Medicine   [facility]

## 2022-11-17 NOTE — PROGRESS NOTES
On CRRT for net zero goal. Chest tube output variable. At one point dumped 200 ml in one hour then back to 0-20/hour. Remains on levophed. Vasopressin intermittent but off since 0200. Neuro remains unresponsive. Appeared to have eyes open to voice at 0000 but no tracking and RN able to close eyes after administering eye lubricant, no responsive to visual threat. No w/d or contraction on RUE, w/d to pain on bilat LE and upper left. Flaccid body with no spontaneous movements. Coughing and grinding ETT. Increased FIO2 to 50% Due to low arterial oxygenation results.

## 2022-11-17 NOTE — PROGRESS NOTES
CLINICAL NUTRITION SERVICES - REASSESSMENT NOTE      Recommendations Ordered by Registered Dietitian (RD):   Advance TF to 20 mL/hr and continue increasing by 20 mL q 12 hours to goal rate as tolerated   Continue certavite for micronutrient needs 2/2 wound healing     Promote @ goal of  80ml/hr  (1920ml/day)  will provide: 1920 kcals (22 kcal/kg), 121 g PRO (1.4 g/kg), 1610 ml free H20, 249 g CHO, and 0 g fiber daily.   Malnutrition: (11/14)  % Weight Loss:  Weight loss does not meet criteria for malnutrition - intentional weight loss over past >1 year   % Intake:  Decreased intake does not meet criteria for malnutrition - intentional calorie deficit   Subcutaneous Fat Loss:  None observed  Muscle Loss:  None observed  Fluid Retention:  Mild      Malnutrition Diagnosis: Patient does not meet two of the above criteria necessary for diagnosing malnutrition       EVALUATION OF PROGRESS TOWARD GOALS   Diet:  NPO (vented)    Nutrition Support - Enteral:    Type of Feeding Tube: NGT   Enteral Frequency:  Continuous  Enteral Regimen: Promote @ 10 mL/hr   Total Enteral Provisions: 240 kcal, 15 g PRO (0.2 g/kg), 31 g CHO, 201 mL free water and no fiber daily   Free Water Flush: 60 mL q 4 hours     Intake/Tolerance:    TF has been at trophic rate x 3 days  OK for TF advancement today per MD in rounds     Labs: K+ 3.8, Mg 2.5 (H), Phos 3.4, T bili 2.1 (H), BUN 33 (H)  Recent Labs   Lab 11/17/22  1006 11/17/22  0819 11/17/22  0418 11/17/22  0157 11/17/22  0015 11/16/22  2136   * 168* 162* 195* 173* 191*     Meds: medium sliding scale insulin, solumedrol q 6 hours, certavite 15ml/day for micronutrients, norepi (0.1 mcg/kg/min), vasopressin off overnight, NaCl IVF at 20 mL/hr   No BM yet this admission (x 4 days)   Weight - trending up likely 2/2 fluid status   Date/Time Weight Weight Method   11/17/22 0500 102.8 kg (226 lb 10.1 oz) Bed scale   11/15/22 0400 105.5 kg (232 lb 9.4 oz) Bed scale   11/14/22 0600 107 kg (235 lb  14.3 oz) Bed scale   11/13/22 0600 106.3 kg (234 lb 5.6 oz) --   11/12/22 1137 101 kg (222 lb 10.6 oz) --       ASSESSED NUTRITION NEEDS:  Dosing Weight 86 kg (adjusted - using admit wt of 101 kg and IBW 81 kg)  Estimated Energy Needs: 8179-3411 kcals (20-25 Kcal/Kg)  Justification: overweight and vented   Estimated Protein Needs: 100-130 grams protein (1.2-1.5 g pro/Kg)  Justification: hypercatabolism with critical illness and preservation of lean body mass  Estimated Fluid Needs: 3903-9315  mL (1 mL/Kcal)  Justification: maintenance or per provider pending fluid status       NEW FINDINGS:   Remains on CRRT, anuric   Chest tube placed yesterday - 1570 mL out   Propofol off yesterday     11/16 WOCN =   Philtrum/Columella   Wound type: Pressure Injury   Pressure Injury Stage: Deep Tissue Pressure Injury (DTPI), hospital acquired   This is a Medical Device Related Pressure Injury (MDRPI) due to ETT     Bilateral buttock   Wound type: Pressure Injury and Friction   Pressure Injury Stage: 1, hospital acquired     Previous Goals (11/14):   TF + Propofol will meet % nutrition needs in the next 3-4 days   Evaluation: Not met    Previous Nutrition Diagnosis:   Inadequate protein-energy intake related to NPO on mechanical ventilation as evidenced by currently meeting 47% energy needs and 0% protein needs with propofol, plan to begin trophic TF today   Evaluation: No change - updated below       CURRENT NUTRITION DIAGNOSIS  Inadequate protein-energy intake related to NPO on mechanical ventilation, trophic TF x 3 days with hemodynamic instability as evidenced by currently meeting 14% energy needs and 15% protein needs with TF     INTERVENTIONS  Recommendations / Nutrition Prescription  Advance TF to 20 mL/hr and continue increasing by 20 mL q 12 hours to goal rate as tolerated   Continue certavite for micronutrient needs 2/2 wound healing     Promote @ goal of  80ml/hr  (1920ml/day)  will provide: 1920 kcals (22  kcal/kg), 121 g PRO (1.4 g/kg), 1610 ml free H20, 249 g CHO, and 0 g fiber daily.    Implementation  EN Composition - orders modified as above  Collaboration and Referral of Nutrition care - patient discussed this morning during interdisciplinary rounds     Goals  TF will meet % nutrition needs in the next 48 hours       MONITORING AND EVALUATION:  Progress towards goals will be monitored and evaluated per protocol and Practice Guidelines      Sirena Gomez RD, LD

## 2022-11-17 NOTE — PLAN OF CARE
Goal Outcome Evaluation:      Plan of Care Reviewed With: other (see comments) (chart review, interdisciplinary rounds)    Overall Patient Progress: no changeOverall Patient Progress: no change    Outcome Evaluation: TF running at 10 mL/hr past 3 days. Plan to advance TF towards goal today - orders modified.    Sirena Gomez RD, LD

## 2022-11-17 NOTE — PROGRESS NOTES
[facility]    Infectious Disease Progress Note    Date of Servic : 11/17/2022     Assessment:  1.Parainfluenza pneumonia in the setting of immunocompromise (liver transplantation on immunosuppressive medications), complicated by superinfection with streptococcal pneumoniae with sepsis and acute hypoxic respiratory failure requiring mechanical ventilation and LORETTA requiring CRRT. Difficult to assess for meningitis  2. Out of hospital cardiac arrest with 20 min CPR for PEA, however patient had gastric intubation en route for an unknown amount of time  3. Right pneumothorax s/p chest tube placement  4. Marked leukocytosis is likely multifactorial related to infection and organ ischemia.   5. S/p liver transplantation for NAFLD related cirrhosis. On Tacrolimus  6. LORETTA on CRRT  7. Thrombocytopenia related to sepsis     Recommendations  1. Continue Ceftriaxone 2 grams Q12H  2. Off sedation and neurological assessment is planned  3. MRI brain is planned  4. Supportive care    Tori Hernández MD    Interval History   Off sedation, remains intubated    Physical Exam   Temp: (!) 96.6  F (35.9  C) Temp src: Esophageal   Pulse: 75   Resp: 9 SpO2: 96 % O2 Device: Mechanical Ventilator    Vitals:    11/14/22 0600 11/15/22 0400 11/17/22 0500   Weight: 107 kg (235 lb 14.3 oz) 105.5 kg (232 lb 9.4 oz) 102.8 kg (226 lb 10.1 oz)     Vital Signs with Ranges  Temp:  [96.1  F (35.6  C)-99  F (37.2  C)] 96.6  F (35.9  C)  Pulse:  [58-78] 75  Resp:  [9-35] 9  MAP:  [65 mmHg-100 mmHg] 95 mmHg  Arterial Line BP: ()/(50-74) 135/73  FiO2 (%):  [40 %-50 %] 50 %  SpO2:  [94 %-100 %] 96 %    Constitutional:  Sedated, intubated  Lungs: R chest tube  Cardiovascular: S1S2  Skin: No rash  MS :  Edema and anasarca    Other:    Medications     dextrose       CRRT replacement solution 0 mL/hr (11/14/22 1030)     epoprostenol Stopped (11/17/22 1032)     heparin 500 Units/hr (11/17/22 0729)     - MEDICATION INSTRUCTIONS -       -  MEDICATION INSTRUCTIONS -       norepinephrine 0.08 mcg/kg/min (11/17/22 1245)     CRRT replacement solution 200 mL/hr at 11/17/22 1159     CRRT replacement solution 0 mL/kg/hr (11/14/22 1030)     propofol Stopped (11/16/22 0910)     sodium chloride 20 mL/hr at 11/17/22 0116     vasopressin Stopped (11/17/22 0222)       albuterol  2.5 mg Nebulization Q6H     artificial tears   Both Eyes Q8H     cefTRIAXone  2 g Intravenous Q12H     chlorhexidine  15 mL Mouth/Throat Q12H     insulin aspart  1-6 Units Subcutaneous Q4H     methylPREDNISolone  62.5 mg Intravenous Q12H     multivitamins w/minerals  15 mL Per Feeding Tube Daily     pantoprazole  40 mg Per Feeding Tube QAM AC    Or     pantoprazole  40 mg Intravenous QAM AC       Data   All microbiology laboratory data reviewed.  Recent Labs   Lab Test 11/17/22  1006 11/17/22  0157 11/16/22  1831   WBC 37.6* 35.9* 39.5*   HGB 10.1* 10.0* 9.9*   HCT 29.3* 30.0* 29.4*   MCV 95 99 98   PLT 63* 58* 63*     Recent Labs   Lab Test 11/17/22  1006 11/17/22  0157 11/16/22  1831   CR 1.10 1.12 1.27*       Microbiology  11/12 blood cx  Peripheral Blood    0 Result Notes  Culture Positive on the 1st day of incubation Abnormal         Streptococcus pneumoniae Panic     2 of 2 bottles  Sent to [facility] for serotyping, report to follow.         Resulting Agency: [facility]      Susceptibility                   Streptococcus pneumoniae       KARAN       Ceftriaxone (meningitis) 0.016 ug/mL Susceptible       Ceftriaxone (non-meningitis) 0.016 ug/mL Susceptible       Levofloxacin 1.5 ug/mL Susceptible       Meropenem 0.012 ug/mL Susceptible       Penicillin (meningitis) 0.016 ug/mL Susceptible       Penicillin (non-meningitis) 0.016 ug/mL Susceptible       Penicillin(oral) 0.016 ug/mL Susceptible       Vancomycin 0.50 ug/mL Susceptible                       Imaging  11/17 CXR  CHEST ONE VIEW  11/17/2022 12:25 PM      HISTORY: PTX yesterday, continued air-leak on chest  tube.     COMPARISON: November 16, 2022                                                                      IMPRESSION: Pleural catheter in place at the apex. Small amount of  pleural air/pneumothorax in the costophrenic angle, this may be an ex  vacuo pneumothorax given the decrease in fluid on the right compared  to previous. Increased hazy density on the left may be related to an  increase in pleural fluid and associated compressive atelectasis and  or infiltrate. Endotracheal tube tip 3.6 cm from the og. Remaining  tubes and lines unchanged.    11/12 CT chest abdomen pelvis  EXAM: CT CHEST ABDOMEN PELVIS W/O CONTRAST  LOCATION: [facility]  DATE/TIME: 11/12/2022 2:27 PM     INDICATION: Cardiac arrest  COMPARISON: PET CT on 09/17/2020 and chest x-ray earlier today  TECHNIQUE: CT scan of the chest, abdomen, and pelvis was performed without IV contrast. Multiplanar reformats were obtained. Dose reduction techniques were used.   CONTRAST: None.     FINDINGS:   LUNGS AND PLEURA: Large right and moderate left pleural effusion and dense consolidation/atelectasis in the lung bases. The endotracheal tube is in the midtrachea. The central tracheobronchial tree is patent.     MEDIASTINUM/AXILLAE: Several small bilateral supraclavicular lymph nodes, indeterminate. No lymphadenopathy in the axillae or mediastinum. No thoracic aortic aneurysm. Mild generalized cardiomegaly. Small pericardial effusion.     CORONARY ARTERY CALCIFICATION: Moderate.     HEPATOBILIARY: Postoperative changes of disease donor liver transplant. Cholecystectomy. No definite focal masses in the hepatic allograft on noncontrast CT.     PANCREAS: No duct dilatation or peripancreatic changes. No definite masses on noncontrast CT.     SPLEEN: Stable mild splenomegaly.     ADRENAL GLANDS: Mild diffuse thickening of the adrenal glands.     KIDNEYS/BLADDER: Small nonobstructing bilateral renal calculi measuring 3-4 mm. No  hydronephrosis or perinephric stranding bilaterally. No renal masses evident on noncontrast CT. Nixon catheter decompresses the urinary bladder.     BOWEL: NG tube is in the stomach. No small bowel or colonic obstruction or inflammatory changes.     LYMPH NODES: No lymphadenopathy.     VASCULATURE: No abdominal aortic aneurysm.     PELVIC ORGANS: No pelvic masses.     OTHER: Moderate ascites. Mild anasarca. No free air or fluid collections. Right common femoral central venous catheter with tip in the mid IVC, at the level of the renal vein confluence.     MUSCULOSKELETAL: Old healed rib fracture deformities. No suspicious lesions in the bones.                                                                      IMPRESSION:  1.  Large right and moderate left pleural effusion and bibasilar dense atelectasis/consolidation.  2.  Moderate ascites.  3.  Stable appearance of the hepatic allograft on noncontrast CT.  4.  Stable mild splenomegaly.  5.  Mild generalized cardiac enlargement and small pericardial effusion.  6.  Numerous small supraclavicular lymph nodes are nonspecific and likely reactive        11/12 CT head  EXAM: CT HEAD W/O CONTRAST  LOCATION: [facility]  DATE/TIME: 11/12/2022 2:27 PM     INDICATION: Cardiac arrest; altered mental state  COMPARISON: None.  TECHNIQUE: Routine CT Head without IV contrast. Multiplanar reformats. Dose reduction techniques were used.     FINDINGS:  INTRACRANIAL CONTENTS: No intracranial hemorrhage, extraaxial collection, or mass effect.  No CT evidence of acute infarct. Mild presumed chronic small vessel ischemic changes. Mild generalized volume loss. No hydrocephalus. Corpus callosum is normal.   Position of the cerebral tonsils is satisfactory. Sella shows no acute abnormality. Vascular calcification.      VISUALIZED ORBITS/SINUSES/MASTOIDS: No intraorbital abnormality. Mild to moderate mucosal thickening scattered about the paranasal sinuses. Patient  is intubated nasally and orally. Trace fluid mastoid air cells bilaterally.     BONES/SOFT TISSUES: Overall mineralization is satisfactory. No fracture of the calvarium or skull base is noted. No swelling of the facial or scalp soft tissues.                                                                      IMPRESSION:  1.  No acute process intracranially.     2.  Patient is intubated nasally and orally.     3.  No intracranial mass, mass effect, or hemorrhage.     4.  Gray-white differentiation is satisfactory with no convincing evidence for cerebral edema.     5.  Partial opacification paranasal sinuses. This may be on an inflammatory basis

## 2022-11-17 NOTE — PROGRESS NOTES
RT PROGRESS NOTE    CURRENT SETTINGS:   Vent Mode: CMV/AC  (Continuous Mandatory Ventilation/ Assist Control)  FiO2 (%): 50 %  Resp Rate (Set): 24 breaths/min  Tidal Volume (Set, mL): 400 mL  PEEP (cm H2O): 12 cmH2O  Resp: 25      PATIENT PARAMETERS:  PIP 25  Pplat:  22  Pmean:  17    Secretions:  Small, cloudy/yellow, thick  02 Sats:  97%  BS: Diminished    ETT SIZE 8 Secured at 27 cm at Lips    Respiratory Medications: Albuterol Q6H     AB22 @0416 pH 7.39; pCO2 37; pO2 70; HCO3 22, BE -2.3 on 40%    NOTE / SHIFT SUMMARY:   Pt is taking Albuterol neb treatment Q6H, BS in the morning was diminished on the Right side of the lungs and the Left side was coarse, after neb treatment it was unchanged. In the afternoon pt BS is diminished bilaterally. Pt secretion is small, yellow/cloudy, & thick. Pt has scab on nares from the outer and inner nares and has a Mepilex pad between lip and etad. Veletri was stopped per MD order. RT will continue to monitor pt.    Yaneth Norton, RT

## 2022-11-18 ENCOUNTER — APPOINTMENT (OUTPATIENT)
Dept: GENERAL RADIOLOGY | Facility: CLINIC | Age: 57
DRG: 004 | End: 2022-11-18
Attending: INTERNAL MEDICINE
Payer: COMMERCIAL

## 2022-11-18 LAB
ALBUMIN SERPL-MCNC: 1.9 G/DL (ref 3.4–5)
ALP SERPL-CCNC: 168 U/L (ref 40–150)
ALT SERPL W P-5'-P-CCNC: 35 U/L (ref 0–70)
ANION GAP SERPL CALCULATED.3IONS-SCNC: 6 MMOL/L (ref 3–14)
ANION GAP SERPL CALCULATED.3IONS-SCNC: 8 MMOL/L (ref 3–14)
ANION GAP SERPL CALCULATED.3IONS-SCNC: 8 MMOL/L (ref 3–14)
APTT PPP: 56 SECONDS (ref 22–38)
AST SERPL W P-5'-P-CCNC: 35 U/L (ref 0–45)
BACTERIA SPT CULT: ABNORMAL
BASE EXCESS BLDA CALC-SCNC: -1.6 MMOL/L (ref -9–1.8)
BILIRUB DIRECT SERPL-MCNC: 1.6 MG/DL (ref 0–0.2)
BILIRUB SERPL-MCNC: 2.1 MG/DL (ref 0.2–1.3)
BUN SERPL-MCNC: 30 MG/DL (ref 7–30)
BUN SERPL-MCNC: 31 MG/DL (ref 7–30)
BUN SERPL-MCNC: 34 MG/DL (ref 7–30)
CA-I BLD-MCNC: 4.5 MG/DL (ref 4.4–5.2)
CA-I BLD-MCNC: 4.6 MG/DL (ref 4.4–5.2)
CA-I BLD-MCNC: 4.6 MG/DL (ref 4.4–5.2)
CALCIUM SERPL-MCNC: 8.2 MG/DL (ref 8.5–10.1)
CALCIUM SERPL-MCNC: 8.4 MG/DL (ref 8.5–10.1)
CALCIUM SERPL-MCNC: 8.5 MG/DL (ref 8.5–10.1)
CHLORIDE BLD-SCNC: 104 MMOL/L (ref 94–109)
CHLORIDE BLD-SCNC: 104 MMOL/L (ref 94–109)
CHLORIDE BLD-SCNC: 105 MMOL/L (ref 94–109)
CO2 SERPL-SCNC: 23 MMOL/L (ref 20–32)
CO2 SERPL-SCNC: 23 MMOL/L (ref 20–32)
CO2 SERPL-SCNC: 25 MMOL/L (ref 20–32)
CREAT SERPL-MCNC: 0.92 MG/DL (ref 0.66–1.25)
CREAT SERPL-MCNC: 1 MG/DL (ref 0.66–1.25)
CREAT SERPL-MCNC: 1.09 MG/DL (ref 0.66–1.25)
ERYTHROCYTE [DISTWIDTH] IN BLOOD BY AUTOMATED COUNT: 14.4 % (ref 10–15)
ERYTHROCYTE [DISTWIDTH] IN BLOOD BY AUTOMATED COUNT: 14.4 % (ref 10–15)
ERYTHROCYTE [DISTWIDTH] IN BLOOD BY AUTOMATED COUNT: 14.5 % (ref 10–15)
GFR SERPL CREATININE-BSD FRML MDRD: 79 ML/MIN/1.73M2
GFR SERPL CREATININE-BSD FRML MDRD: 87 ML/MIN/1.73M2
GFR SERPL CREATININE-BSD FRML MDRD: >90 ML/MIN/1.73M2
GLUCOSE BLD-MCNC: 194 MG/DL (ref 70–99)
GLUCOSE BLD-MCNC: 229 MG/DL (ref 70–99)
GLUCOSE BLD-MCNC: 240 MG/DL (ref 70–99)
GLUCOSE BLDC GLUCOMTR-MCNC: 178 MG/DL (ref 70–99)
GLUCOSE BLDC GLUCOMTR-MCNC: 180 MG/DL (ref 70–99)
GLUCOSE BLDC GLUCOMTR-MCNC: 202 MG/DL (ref 70–99)
GLUCOSE BLDC GLUCOMTR-MCNC: 213 MG/DL (ref 70–99)
GLUCOSE BLDC GLUCOMTR-MCNC: 227 MG/DL (ref 70–99)
GLUCOSE BLDC GLUCOMTR-MCNC: 235 MG/DL (ref 70–99)
GRAM STAIN RESULT: ABNORMAL
GRAM STAIN RESULT: ABNORMAL
HCO3 BLD-SCNC: 23 MMOL/L (ref 21–28)
HCT VFR BLD AUTO: 28.4 % (ref 40–53)
HCT VFR BLD AUTO: 28.9 % (ref 40–53)
HCT VFR BLD AUTO: 29.3 % (ref 40–53)
HGB BLD-MCNC: 9.3 G/DL (ref 13.3–17.7)
HGB BLD-MCNC: 9.5 G/DL (ref 13.3–17.7)
HGB BLD-MCNC: 9.7 G/DL (ref 13.3–17.7)
MAGNESIUM SERPL-MCNC: 2.4 MG/DL (ref 1.6–2.3)
MCH RBC QN AUTO: 32.4 PG (ref 26.5–33)
MCH RBC QN AUTO: 32.5 PG (ref 26.5–33)
MCH RBC QN AUTO: 32.7 PG (ref 26.5–33)
MCHC RBC AUTO-ENTMCNC: 32.7 G/DL (ref 31.5–36.5)
MCHC RBC AUTO-ENTMCNC: 32.9 G/DL (ref 31.5–36.5)
MCHC RBC AUTO-ENTMCNC: 33.1 G/DL (ref 31.5–36.5)
MCV RBC AUTO: 99 FL (ref 78–100)
O2/TOTAL GAS SETTING VFR VENT: 50 %
PCO2 BLD: 38 MM HG (ref 35–45)
PH BLD: 7.39 [PH] (ref 7.35–7.45)
PHOSPHATE SERPL-MCNC: 2.6 MG/DL (ref 2.5–4.5)
PHOSPHATE SERPL-MCNC: 3.1 MG/DL (ref 2.5–4.5)
PHOSPHATE SERPL-MCNC: 3.4 MG/DL (ref 2.5–4.5)
PLATELET # BLD AUTO: 40 10E3/UL (ref 150–450)
PLATELET # BLD AUTO: 41 10E3/UL (ref 150–450)
PLATELET # BLD AUTO: 45 10E3/UL (ref 150–450)
PO2 BLD: 74 MM HG (ref 80–105)
POTASSIUM BLD-SCNC: 3.7 MMOL/L (ref 3.4–5.3)
POTASSIUM BLD-SCNC: 3.8 MMOL/L (ref 3.4–5.3)
POTASSIUM BLD-SCNC: 3.9 MMOL/L (ref 3.4–5.3)
PROT SERPL-MCNC: 5.9 G/DL (ref 6.8–8.8)
RBC # BLD AUTO: 2.87 10E6/UL (ref 4.4–5.9)
RBC # BLD AUTO: 2.92 10E6/UL (ref 4.4–5.9)
RBC # BLD AUTO: 2.97 10E6/UL (ref 4.4–5.9)
SODIUM SERPL-SCNC: 135 MMOL/L (ref 133–144)
SODIUM SERPL-SCNC: 135 MMOL/L (ref 133–144)
SODIUM SERPL-SCNC: 136 MMOL/L (ref 133–144)
WBC # BLD AUTO: 26.1 10E3/UL (ref 4–11)
WBC # BLD AUTO: 27.5 10E3/UL (ref 4–11)
WBC # BLD AUTO: 30.2 10E3/UL (ref 4–11)

## 2022-11-18 PROCEDURE — 82330 ASSAY OF CALCIUM: CPT | Performed by: STUDENT IN AN ORGANIZED HEALTH CARE EDUCATION/TRAINING PROGRAM

## 2022-11-18 PROCEDURE — 250N000009 HC RX 250: Performed by: STUDENT IN AN ORGANIZED HEALTH CARE EDUCATION/TRAINING PROGRAM

## 2022-11-18 PROCEDURE — 85730 THROMBOPLASTIN TIME PARTIAL: CPT | Performed by: STUDENT IN AN ORGANIZED HEALTH CARE EDUCATION/TRAINING PROGRAM

## 2022-11-18 PROCEDURE — 250N000013 HC RX MED GY IP 250 OP 250 PS 637: Performed by: STUDENT IN AN ORGANIZED HEALTH CARE EDUCATION/TRAINING PROGRAM

## 2022-11-18 PROCEDURE — 99232 SBSQ HOSP IP/OBS MODERATE 35: CPT | Performed by: STUDENT IN AN ORGANIZED HEALTH CARE EDUCATION/TRAINING PROGRAM

## 2022-11-18 PROCEDURE — 999N000009 HC STATISTIC AIRWAY CARE

## 2022-11-18 PROCEDURE — 99291 CRITICAL CARE FIRST HOUR: CPT | Performed by: STUDENT IN AN ORGANIZED HEALTH CARE EDUCATION/TRAINING PROGRAM

## 2022-11-18 PROCEDURE — 250N000009 HC RX 250: Performed by: INTERNAL MEDICINE

## 2022-11-18 PROCEDURE — 250N000011 HC RX IP 250 OP 636: Performed by: STUDENT IN AN ORGANIZED HEALTH CARE EDUCATION/TRAINING PROGRAM

## 2022-11-18 PROCEDURE — 86022 PLATELET ANTIBODIES: CPT | Performed by: STUDENT IN AN ORGANIZED HEALTH CARE EDUCATION/TRAINING PROGRAM

## 2022-11-18 PROCEDURE — 82803 BLOOD GASES ANY COMBINATION: CPT | Performed by: STUDENT IN AN ORGANIZED HEALTH CARE EDUCATION/TRAINING PROGRAM

## 2022-11-18 PROCEDURE — 71045 X-RAY EXAM CHEST 1 VIEW: CPT | Mod: 76

## 2022-11-18 PROCEDURE — 94640 AIRWAY INHALATION TREATMENT: CPT | Mod: 76

## 2022-11-18 PROCEDURE — 99291 CRITICAL CARE FIRST HOUR: CPT | Mod: FS | Performed by: PSYCHIATRY & NEUROLOGY

## 2022-11-18 PROCEDURE — 80053 COMPREHEN METABOLIC PANEL: CPT | Performed by: INTERNAL MEDICINE

## 2022-11-18 PROCEDURE — 94640 AIRWAY INHALATION TREATMENT: CPT

## 2022-11-18 PROCEDURE — 99233 SBSQ HOSP IP/OBS HIGH 50: CPT | Performed by: SPECIALIST

## 2022-11-18 PROCEDURE — 999N000157 HC STATISTIC RCP TIME EA 10 MIN

## 2022-11-18 PROCEDURE — 83735 ASSAY OF MAGNESIUM: CPT | Performed by: STUDENT IN AN ORGANIZED HEALTH CARE EDUCATION/TRAINING PROGRAM

## 2022-11-18 PROCEDURE — G0463 HOSPITAL OUTPT CLINIC VISIT: HCPCS

## 2022-11-18 PROCEDURE — 85027 COMPLETE CBC AUTOMATED: CPT | Performed by: INTERNAL MEDICINE

## 2022-11-18 PROCEDURE — 84100 ASSAY OF PHOSPHORUS: CPT | Performed by: STUDENT IN AN ORGANIZED HEALTH CARE EDUCATION/TRAINING PROGRAM

## 2022-11-18 PROCEDURE — 85027 COMPLETE CBC AUTOMATED: CPT | Performed by: STUDENT IN AN ORGANIZED HEALTH CARE EDUCATION/TRAINING PROGRAM

## 2022-11-18 PROCEDURE — 200N000001 HC R&B ICU

## 2022-11-18 PROCEDURE — 94003 VENT MGMT INPAT SUBQ DAY: CPT

## 2022-11-18 PROCEDURE — 82248 BILIRUBIN DIRECT: CPT | Performed by: INTERNAL MEDICINE

## 2022-11-18 PROCEDURE — 71045 X-RAY EXAM CHEST 1 VIEW: CPT

## 2022-11-18 RX ORDER — POLYETHYLENE GLYCOL 3350 17 G/17G
17 POWDER, FOR SOLUTION ORAL DAILY
Status: DISCONTINUED | OUTPATIENT
Start: 2022-11-18 | End: 2022-12-05

## 2022-11-18 RX ORDER — AMOXICILLIN 250 MG
1 CAPSULE ORAL 2 TIMES DAILY
Status: DISCONTINUED | OUTPATIENT
Start: 2022-11-18 | End: 2022-12-05

## 2022-11-18 RX ORDER — CARBOXYMETHYLCELLULOSE SODIUM 5 MG/ML
1 SOLUTION/ DROPS OPHTHALMIC 3 TIMES DAILY
Status: DISCONTINUED | OUTPATIENT
Start: 2022-11-18 | End: 2022-12-15 | Stop reason: HOSPADM

## 2022-11-18 RX ORDER — CEFTRIAXONE 2 G/1
2 INJECTION, POWDER, FOR SOLUTION INTRAMUSCULAR; INTRAVENOUS EVERY 24 HOURS
Status: DISCONTINUED | OUTPATIENT
Start: 2022-11-19 | End: 2022-11-25

## 2022-11-18 RX ORDER — AMOXICILLIN 250 MG
2 CAPSULE ORAL 2 TIMES DAILY
Status: DISCONTINUED | OUTPATIENT
Start: 2022-11-18 | End: 2022-12-05

## 2022-11-18 RX ADMIN — CALCIUM CHLORIDE, MAGNESIUM CHLORIDE, SODIUM CHLORIDE, SODIUM BICARBONATE, POTASSIUM CHLORIDE AND SODIUM PHOSPHATE DIBASIC DIHYDRATE 5000 ML: 3.68; 3.05; 6.34; 3.09; .314; .187 INJECTION INTRAVENOUS at 00:32

## 2022-11-18 RX ADMIN — CALCIUM CHLORIDE, MAGNESIUM CHLORIDE, DEXTROSE MONOHYDRATE, LACTIC ACID, SODIUM CHLORIDE, SODIUM BICARBONATE AND POTASSIUM CHLORIDE 5000 ML: 5.15; 2.03; 22; 5.4; 6.46; 3.09; .157 INJECTION INTRAVENOUS at 01:10

## 2022-11-18 RX ADMIN — Medication 1 DROP: at 16:05

## 2022-11-18 RX ADMIN — CALCIUM CHLORIDE, MAGNESIUM CHLORIDE, SODIUM CHLORIDE, SODIUM BICARBONATE, POTASSIUM CHLORIDE AND SODIUM PHOSPHATE DIBASIC DIHYDRATE 5000 ML: 3.68; 3.05; 6.34; 3.09; .314; .187 INJECTION INTRAVENOUS at 07:19

## 2022-11-18 RX ADMIN — CALCIUM CHLORIDE, MAGNESIUM CHLORIDE, DEXTROSE MONOHYDRATE, LACTIC ACID, SODIUM CHLORIDE, SODIUM BICARBONATE AND POTASSIUM CHLORIDE 5000 ML: 5.15; 2.03; 22; 5.4; 6.46; 3.09; .157 INJECTION INTRAVENOUS at 21:16

## 2022-11-18 RX ADMIN — CHLORHEXIDINE GLUCONATE 0.12% ORAL RINSE 15 ML: 1.2 LIQUID ORAL at 09:28

## 2022-11-18 RX ADMIN — INSULIN ASPART 1 UNITS: 100 INJECTION, SOLUTION INTRAVENOUS; SUBCUTANEOUS at 04:47

## 2022-11-18 RX ADMIN — INSULIN ASPART 2 UNITS: 100 INJECTION, SOLUTION INTRAVENOUS; SUBCUTANEOUS at 09:37

## 2022-11-18 RX ADMIN — CALCIUM CHLORIDE, MAGNESIUM CHLORIDE, SODIUM CHLORIDE, SODIUM BICARBONATE, POTASSIUM CHLORIDE AND SODIUM PHOSPHATE DIBASIC DIHYDRATE 5000 ML: 3.68; 3.05; 6.34; 3.09; .314; .187 INJECTION INTRAVENOUS at 09:30

## 2022-11-18 RX ADMIN — CALCIUM CHLORIDE, MAGNESIUM CHLORIDE, DEXTROSE MONOHYDRATE, LACTIC ACID, SODIUM CHLORIDE, SODIUM BICARBONATE AND POTASSIUM CHLORIDE: 5.15; 2.03; 22; 5.4; 6.46; 3.09; .157 INJECTION INTRAVENOUS at 23:07

## 2022-11-18 RX ADMIN — SENNOSIDES AND DOCUSATE SODIUM 2 TABLET: 50; 8.6 TABLET ORAL at 20:40

## 2022-11-18 RX ADMIN — CALCIUM CHLORIDE, MAGNESIUM CHLORIDE, DEXTROSE MONOHYDRATE, LACTIC ACID, SODIUM CHLORIDE, SODIUM BICARBONATE AND POTASSIUM CHLORIDE 5000 ML: 5.15; 2.03; 22; 5.4; 6.46; 3.09; .157 INJECTION INTRAVENOUS at 11:39

## 2022-11-18 RX ADMIN — CALCIUM CHLORIDE, MAGNESIUM CHLORIDE, SODIUM CHLORIDE, SODIUM BICARBONATE, POTASSIUM CHLORIDE AND SODIUM PHOSPHATE DIBASIC DIHYDRATE 5000 ML: 3.68; 3.05; 6.34; 3.09; .314; .187 INJECTION INTRAVENOUS at 02:26

## 2022-11-18 RX ADMIN — ALBUTEROL SULFATE 2.5 MG: 2.5 SOLUTION RESPIRATORY (INHALATION) at 07:13

## 2022-11-18 RX ADMIN — CALCIUM CHLORIDE, MAGNESIUM CHLORIDE, SODIUM CHLORIDE, SODIUM BICARBONATE, POTASSIUM CHLORIDE AND SODIUM PHOSPHATE DIBASIC DIHYDRATE 5000 ML: 3.68; 3.05; 6.34; 3.09; .314; .187 INJECTION INTRAVENOUS at 05:03

## 2022-11-18 RX ADMIN — Medication 1 DROP: at 10:13

## 2022-11-18 RX ADMIN — INSULIN ASPART 1 UNITS: 100 INJECTION, SOLUTION INTRAVENOUS; SUBCUTANEOUS at 00:36

## 2022-11-18 RX ADMIN — CALCIUM CHLORIDE, MAGNESIUM CHLORIDE, SODIUM CHLORIDE, SODIUM BICARBONATE, POTASSIUM CHLORIDE AND SODIUM PHOSPHATE DIBASIC DIHYDRATE 5000 ML: 3.68; 3.05; 6.34; 3.09; .314; .187 INJECTION INTRAVENOUS at 21:15

## 2022-11-18 RX ADMIN — CALCIUM CHLORIDE, MAGNESIUM CHLORIDE, SODIUM CHLORIDE, SODIUM BICARBONATE, POTASSIUM CHLORIDE AND SODIUM PHOSPHATE DIBASIC DIHYDRATE 5000 ML: 3.68; 3.05; 6.34; 3.09; .314; .187 INJECTION INTRAVENOUS at 18:53

## 2022-11-18 RX ADMIN — Medication 40 MG: at 09:49

## 2022-11-18 RX ADMIN — Medication 15 ML: at 09:49

## 2022-11-18 RX ADMIN — ALBUTEROL SULFATE 2.5 MG: 2.5 SOLUTION RESPIRATORY (INHALATION) at 02:14

## 2022-11-18 RX ADMIN — CALCIUM CHLORIDE, MAGNESIUM CHLORIDE, DEXTROSE MONOHYDRATE, LACTIC ACID, SODIUM CHLORIDE, SODIUM BICARBONATE AND POTASSIUM CHLORIDE 5000 ML: 5.15; 2.03; 22; 5.4; 6.46; 3.09; .157 INJECTION INTRAVENOUS at 06:24

## 2022-11-18 RX ADMIN — METHYLPREDNISOLONE SODIUM SUCCINATE 62.5 MG: 125 INJECTION, POWDER, FOR SOLUTION INTRAMUSCULAR; INTRAVENOUS at 22:12

## 2022-11-18 RX ADMIN — METHYLPREDNISOLONE SODIUM SUCCINATE 62.5 MG: 125 INJECTION, POWDER, FOR SOLUTION INTRAMUSCULAR; INTRAVENOUS at 09:50

## 2022-11-18 RX ADMIN — CEFTRIAXONE SODIUM 2 G: 2 INJECTION, POWDER, FOR SOLUTION INTRAMUSCULAR; INTRAVENOUS at 04:25

## 2022-11-18 RX ADMIN — CALCIUM CHLORIDE, MAGNESIUM CHLORIDE, DEXTROSE MONOHYDRATE, LACTIC ACID, SODIUM CHLORIDE, SODIUM BICARBONATE AND POTASSIUM CHLORIDE 5000 ML: 5.15; 2.03; 22; 5.4; 6.46; 3.09; .157 INJECTION INTRAVENOUS at 16:40

## 2022-11-18 RX ADMIN — CHLORHEXIDINE GLUCONATE 0.12% ORAL RINSE 15 ML: 1.2 LIQUID ORAL at 20:40

## 2022-11-18 RX ADMIN — SENNOSIDES AND DOCUSATE SODIUM 1 TABLET: 50; 8.6 TABLET ORAL at 09:50

## 2022-11-18 RX ADMIN — ARGATROBAN 0.5 MCG/KG/MIN: 50 INJECTION, SOLUTION INTRAVENOUS at 13:11

## 2022-11-18 RX ADMIN — Medication 1 DROP: at 22:57

## 2022-11-18 RX ADMIN — POLYETHYLENE GLYCOL 3350 17 G: 17 POWDER, FOR SOLUTION ORAL at 10:13

## 2022-11-18 RX ADMIN — ALBUTEROL SULFATE 2.5 MG: 2.5 SOLUTION RESPIRATORY (INHALATION) at 12:12

## 2022-11-18 RX ADMIN — CALCIUM CHLORIDE, MAGNESIUM CHLORIDE, SODIUM CHLORIDE, SODIUM BICARBONATE, POTASSIUM CHLORIDE AND SODIUM PHOSPHATE DIBASIC DIHYDRATE 5000 ML: 3.68; 3.05; 6.34; 3.09; .314; .187 INJECTION INTRAVENOUS at 16:35

## 2022-11-18 RX ADMIN — ALBUTEROL SULFATE 2.5 MG: 2.5 SOLUTION RESPIRATORY (INHALATION) at 19:34

## 2022-11-18 RX ADMIN — MINERAL OIL, PETROLATUM: 425; 573 OINTMENT OPHTHALMIC at 00:37

## 2022-11-18 RX ADMIN — CALCIUM CHLORIDE, MAGNESIUM CHLORIDE, SODIUM CHLORIDE, SODIUM BICARBONATE, POTASSIUM CHLORIDE AND SODIUM PHOSPHATE DIBASIC DIHYDRATE 5000 ML: 3.68; 3.05; 6.34; 3.09; .314; .187 INJECTION INTRAVENOUS at 13:55

## 2022-11-18 RX ADMIN — CALCIUM CHLORIDE, MAGNESIUM CHLORIDE, SODIUM CHLORIDE, SODIUM BICARBONATE, POTASSIUM CHLORIDE AND SODIUM PHOSPHATE DIBASIC DIHYDRATE 5000 ML: 3.68; 3.05; 6.34; 3.09; .314; .187 INJECTION INTRAVENOUS at 11:43

## 2022-11-18 ASSESSMENT — ACTIVITIES OF DAILY LIVING (ADL)
ADLS_ACUITY_SCORE: 53
ADLS_ACUITY_SCORE: 51
ADLS_ACUITY_SCORE: 53

## 2022-11-18 NOTE — PROGRESS NOTES
[facility] ICU RESPIRATORY NOTE           Date of Admission: 11/12/2022     Date of Intubation (most recent): 11/12/2022     Reason for Mechanical Ventilation: Respiratory failure     Number of Days on Mechanical Ventilation: 7     Met Criteria for Spontaneous Breathing Trial: No     Reason for No Spontaneous Breathing Trial: Per MD     Significant Events Today: None overnight     ABG Results:   Recent Labs   Lab 11/17/22 2040 11/17/22  0416 11/16/22  1256 11/16/22  0952   PH 7.41 7.39 7.35 7.31*   PCO2 34* 37 37 39   PO2 85 70* 105 72*   HCO3 22 22 20* 20*   O2PER 50 40 50 60     Vent Mode: CMV/AC  (Continuous Mandatory Ventilation/ Assist Control)  FiO2 (%): 40 %  Resp Rate (Set): 24 breaths/min  Tidal Volume (Set, mL): 400 mL  PEEP (cm H2O): 10 cmH2O  Resp: 23    YSABEL Brantley, RRT

## 2022-11-18 NOTE — PROGRESS NOTES
Intermittently moving head left to right, withdraws on all 4, appears to need painful stimuli for most response. Pupils equal. Spontaneously trying to lift RUE and LUE hand intermittently. Unclear as to purposeful. Does not follow. Tolerated CRRT fluid removal with small amount of levo. thick inline ett secretions requiring lavage. Spoke with MD regarding low platelets and orders to continue to monitor.

## 2022-11-18 NOTE — PROGRESS NOTES
2215-while RN was suctioning ETT, unable to suction d/t thick secretions, mucous plug.RT, RN Bagged and lavaged. o2 sats down to 88% briefly and returned. Placed back on vent.

## 2022-11-18 NOTE — PROGRESS NOTES
"[facility]    Neurocritical Care Progress Note    Interval EventsMRI done last evening for prognostication.  Maintained on levophed infusion still   Still intubated and on CRRT  Afebrile, glucose low 200s  Per RN a little more responsive    HPI Summary  57M who presented to the ED 11/12 after witnessed PEA arrest. He has a PMH of nonalcoholic fatty liver disease s/o transplant (9/2016) and cirrhosis.    Stroke Evaluation Summarized    MRI/Head CT MRI brain shows multifocal punctate areas of acute to subacute ischemia   Intracranial Vasculature None yet   Cervical Vasculature None yet     Echocardiogram EF 55-60%, mild TR mild AR   EKG/Telemetry Sinus keon, prolonged QT   Other Testing Not Applicable      LDL  No lab value available in past 30 days   A1C  No lab value available in past 90 days   Troponin No lab value available in past 48 hrs       Impression/Plan   1. Encephalopathy likely multifactorial but fortunately MRI brain shows no obvious anoxic injury at this time. Had 16 hrs of EEG from 11/13 to 11/14 showing no seizures but generalized burst suppression was seen. Could have lingering effects of previous heavy sedation with his ongoing renal and hepatic impairment  - Continue supportive care    2. Multifocal small ischemic strokes  - Suspect due to cardiac arrest  - If he recovers well, consider doing head/neck vessel imaging and LDL & A1c for secondary prevention, but will not order these now    3. Acute hypoxic respiratory failure  - Per pulmcrit team    Patient Follow-up    - final recommendation pending work-up    We will continue to follow.     Linda Martinez PA-C  Vascular Neurology    To page me or covering stroke neurology team member, click here: [facility]COM   Choose \"On Call\" tab at top, then search dropdown box for \"Neurology Adult\", select location, press Enter, then look for stroke/neuro " ICU/telestroke.  ______________________________________________________    Clinically Significant Risk Factors Present on Admission                  Medications   Scheduled Meds    albuterol  2.5 mg Nebulization Q6H     artificial tears   Both Eyes Q8H     cefTRIAXone  2 g Intravenous Q12H     chlorhexidine  15 mL Mouth/Throat Q12H     insulin aspart  1-6 Units Subcutaneous Q4H     methylPREDNISolone  62.5 mg Intravenous Q12H     multivitamins w/minerals  15 mL Per Feeding Tube Daily     pantoprazole  40 mg Per Feeding Tube QAM AC    Or     pantoprazole  40 mg Intravenous QAM AC       Infusion Meds    dextrose       CRRT replacement solution 0 mL/hr (11/14/22 1030)     epoprostenol Stopped (11/17/22 1032)     heparin 500 Units/hr (11/18/22 0735)     - MEDICATION INSTRUCTIONS -       - MEDICATION INSTRUCTIONS -       norepinephrine 0.04 mcg/kg/min (11/18/22 0735)     CRRT replacement solution 200 mL/hr at 11/17/22 1941     CRRT replacement solution 0 mL/kg/hr (11/14/22 1030)     propofol Stopped (11/16/22 0910)     sodium chloride 10 mL/hr at 11/18/22 0700     vasopressin Stopped (11/17/22 0222)       PRN Meds  sodium chloride 0.9%, albuterol, calcium gluconate, calcium gluconate, dextrose, glucose **OR** dextrose **OR** glucagon, fentaNYL, fentaNYL, magnesium sulfate, - MEDICATION INSTRUCTIONS -, - MEDICATION INSTRUCTIONS -, metoprolol, naloxone **OR** naloxone **OR** naloxone **OR** naloxone, ondansetron **OR** ondansetron, potassium chloride, prochlorperazine **OR** prochlorperazine **OR** prochlorperazine, senna-docusate **OR** senna-docusate, sodium phosphate, vecuronium, vecuronium       PHYSICAL EXAMINATION  Temp:  [96.1  F (35.6  C)-98.2  F (36.8  C)] 96.9  F (36.1  C)  Pulse:  [61-85] 77  Resp:  [0-35] 24  BP: (107-130)/(54-85) 130/85  MAP:  [66 mmHg-102 mmHg] 85 mmHg  Arterial Line BP: ()/(52-80) 120/66  FiO2 (%):  [40 %-50 %] 50 %  SpO2:  [94 %-100 %] 100 %      General Exam  General:  unrepsonsive     HEENT:  intubated  Cardio:  RRR  Pulmonary:  on mechanical ventilation      Neuro Exam  Mental Status: turns head to shout slightly and opens eyes up to shout and clap. Does not follow any commands reliably except one time seemed to close eyes to command but did not reproduce  Cranial Nerves:  PERRL, no blink to threat, face grossely symmetric + gag  Motor:  with stimulation makes fist with L hand, minimal withdraw of both hands to noxious, triple flexion to noxious with legs  Reflexes:  deferred  Sensory:  as above to noxious  Coordination:  unable to test  Station/Gait:  deferred      Imaging  I personally reviewed all imaging; relevant findings per HPI.     Lab Results Data   CBC  Recent Labs   Lab 11/18/22 0410 11/17/22 2031 11/17/22  1006   WBC 30.2* 32.5* 37.6*   RBC 2.92* 2.88* 3.07*   HGB 9.5* 9.5* 10.1*   HCT 28.9* 28.4* 29.3*   PLT 45* 54* 63*     Basic Metabolic Panel    Recent Labs   Lab 11/18/22 0410 11/18/22 0409 11/17/22 2358 11/17/22 2031 11/17/22  1208 11/17/22  1006     --   --  138  --  137   POTASSIUM 3.7  --   --  3.8  --  3.8   CHLORIDE 104  --   --  105  --  104   CO2 25  --   --  21  --  19*   BUN 34*  --   --  38*  --  33*   CR 1.09  --   --  1.15  --  1.10   * 178* 180* 212*   < > 178*   KELLY 8.4*  --   --  8.4*  --  8.5    < > = values in this interval not displayed.     Liver Panel  Recent Labs   Lab 11/18/22 0410 11/17/22 2031 11/17/22  1006 11/17/22 0416 11/16/22  0952 11/16/22  0546   PROTTOTAL 5.9*  --   --  6.0*  --  5.6*   ALBUMIN 1.9* 1.9* 2.0* 1.9*   < > 2.0*   BILITOTAL 2.1*  --   --  2.1*  --  2.8*   ALKPHOS 168*  --   --  176*  --  188*   AST 35  --   --  75*  --  74*   ALT 35  --   --  46  --  41    < > = values in this interval not displayed.     INR    Recent Labs   Lab Test 11/14/22  1020 11/14/22  0201 11/13/22  1701   INR 1.50* 1.47* 1.79*      Lipid Profile    Recent Labs   Lab Test 04/20/20  1157 08/01/19  1500 01/08/19  0840 10/31/17  1037  03/01/16  0648   CHOL 161  --   --  134 199   HDL 42  --   --  57 72   LDL 81  --   --  58 111*   TRIG 192* 177* 135 92 82     A1C    Recent Labs   Lab Test 09/22/16  0741   A1C 4.8     Troponin    Recent Labs   Lab 11/12/22  1147   TROPONINIS 10          Data   I have personally spent a total of 35 minutes providing care today, time spent in reviewing medical records and reviewing tests, examining the patient and obtaining history, coordination of care, and discussion with the patient and/or family regarding diagnostic results, prognosis, symptom management, risks and benefits of management options, and development of plan of care. Greater than 50% was spent in counseling and coordination of care.

## 2022-11-18 NOTE — PROGRESS NOTES
[facility]    Infectious Disease Progress Note    Date of Service : 11/18/2022       Assessment:  1.Parainfluenza pneumonia in the setting of immunocompromise (liver transplantation on immunosuppressive medications), complicated by superinfection with streptococcal pneumoniae with septic shock and acute hypoxic respiratory failure requiring mechanical ventilation and LORETTA requiring CRRT. Difficult to assess for meningitis  2. Out of hospital cardiac arrest with 20 min CPR for PEA, however patient had gastric intubation en route for an unknown amount of time  3. Right pneumothorax s/p chest tube placement  4. Marked leukocytosis is likely multifactorial related to infection and organ ischemia.   5. S/p liver transplantation for NAFLD related cirrhosis. On Tacrolimus  6. LORETTA on CRRT  7. Thrombocytopenia related to sepsis    Recommendations  1. Decrease dose of Ceftriaxone 2 grams to daily dosing. No meningeal enhancement on MRI to suggest meningitis. Additional history obtained from wife, patient had no complaints of headache, neck stiffness either   2. Off sedation and neurological assessment is planned  3. Supportive care   4. Chest tube management perICU team  5. DIC panel is being checked  Multiple specialties following  Discussed with Neurology      Tori Hernández MD    Interval History   Remains off sedation, responding some to family, leukocytosis is improving, remains afebrile. MRI brain done yesterday notes ischemic changes, no meningeal enhancement to suggest meningitis. Remains on CRRT. Progressive thrombocytopenia    Physical Exam   Temp: 96.9  F (36.1  C) Temp src: Temporal BP: 130/85 Pulse: 77   Resp: 24 SpO2: 100 % O2 Device: Mechanical Ventilator    Vitals:    11/15/22 0400 11/17/22 0500 11/18/22 0600   Weight: 105.5 kg (232 lb 9.4 oz) 102.8 kg (226 lb 10.1 oz) 103.7 kg (228 lb 9.9 oz)     Vital Signs with Ranges  Temp:  [96.4  F (35.8  C)-98.2  F (36.8  C)] 96.9  F (36.1   C)  Pulse:  [61-85] 77  Resp:  [0-33] 24  BP: (107-130)/(54-85) 130/85  MAP:  [66 mmHg-102 mmHg] 85 mmHg  Arterial Line BP: ()/(52-80) 120/66  FiO2 (%):  [40 %-50 %] 50 %  SpO2:  [94 %-100 %] 100 %    Constitutional:  intubated, feeding tube in place  Lungs: R chest tube, anterior auscultation clear  Cardiovascular: S1S2  Abdomen: soft  Skin: No rash  MS :  Edema and anasarca    Other:    Medications     argatroban       dextrose       CRRT replacement solution 0 mL/hr (11/14/22 1030)     epoprostenol Stopped (11/17/22 1032)     - MEDICATION INSTRUCTIONS -       - MEDICATION INSTRUCTIONS -       norepinephrine 0.02 mcg/kg/min (11/18/22 1005)     CRRT replacement solution 200 mL/hr at 11/17/22 1941     CRRT replacement solution 0 mL/kg/hr (11/14/22 1030)     propofol Stopped (11/16/22 0910)     sodium chloride 10 mL/hr at 11/18/22 0800     vasopressin Stopped (11/17/22 0222)       albuterol  2.5 mg Nebulization Q6H     artificial tears  1 drop Both Eyes TID     cefTRIAXone  2 g Intravenous Q12H     chlorhexidine  15 mL Mouth/Throat Q12H     insulin aspart  1-6 Units Subcutaneous Q4H     methylPREDNISolone  62.5 mg Intravenous Q12H     multivitamins w/minerals  15 mL Per Feeding Tube Daily     pantoprazole  40 mg Per Feeding Tube QAM AC    Or     pantoprazole  40 mg Intravenous QAM AC     polyethylene glycol  17 g Oral Daily     senna-docusate  1 tablet Oral BID    Or     senna-docusate  2 tablet Oral BID       Data   All microbiology laboratory data reviewed.  Recent Labs   Lab Test 11/18/22 0410 11/17/22 2031 11/17/22  1006   WBC 30.2* 32.5* 37.6*   HGB 9.5* 9.5* 10.1*   HCT 28.9* 28.4* 29.3*   MCV 99 99 95   PLT 45* 54* 63*     Recent Labs   Lab Test 11/18/22 0410 11/17/22 2031 11/17/22  1006   CR 1.09 1.15 1.10        Microbiology  11/12 blood cx  Peripheral Blood    0 Result Notes  Culture Positive on the 1st day of incubation Abnormal         Streptococcus pneumoniae Panic     2 of 2 bottles  Sent to  [facility] for serotyping, report to follow.         Resulting Agency: [facility]      Susceptibility                   Streptococcus pneumoniae       KARAN       Ceftriaxone (meningitis) 0.016 ug/mL Susceptible       Ceftriaxone (non-meningitis) 0.016 ug/mL Susceptible       Levofloxacin 1.5 ug/mL Susceptible       Meropenem 0.012 ug/mL Susceptible       Penicillin (meningitis) 0.016 ug/mL Susceptible       Penicillin (non-meningitis) 0.016 ug/mL Susceptible       Penicillin(oral) 0.016 ug/mL Susceptible       Vancomycin 0.50 ug/mL Susceptible                   Imaging  11/17 MRI brain  EXAM: MR BRAIN W/O and W CONTRAST  LOCATION: [facility]  DATE/TIME: 11/17/2022 6:58 PM     INDICATION: Evaluate for anoxic brain injury following cardiac arrest.  COMPARISON: Head CT 11/12/2022 and 11/16/2022.  CONTRAST: 8.5 mL Gadavist  TECHNIQUE: Routine multiplanar multisequence head MRI without and with intravenous contrast.     FINDINGS:  INTRACRANIAL CONTENTS: Multifocal areas of restricted diffusion are identified intracranially with cortical/subcortical involvement of the paramedian posterior right parietal level, deep white matter posterior right frontal corona radiata, anterior   subcortical white matter left frontal lobe, and left periventricular temporooccipital junction deep white matter, with restricted diffusion, corresponding low ADC map value and fairly concordant FLAIR hyperintense signal abnormality. No evidence for   hemorrhagic transformation. These areas of ischemic involvement are better defined than previous CT due to modality/diffusion-weighted imaging. Multifocal distribution suggests possible central thromboembolic etiology but is nonspecific. There is no mass   effect, sulcal effacement or ventricular effacement present. No additional restricted diffusion abnormality. Otherwise parenchymal signal is satisfactory for age. Mild to moderate generalized cerebral atrophy.  No hydrocephalus. Normal position of the   cerebellar tonsils. Postcontrast imaging shows no corresponding enhancement abnormality of the areas of restricted diffusion. No pathologic enhancement is present. No abnormal intraparenchymal leptomeningeal or dural enhancement. Dural venous sinus   enhancement is satisfactory. Meckel's cave and cavernous sinus enhancement is normal. No pathologic orbital enhancement.     SELLA: No abnormality accounting for technique.     OSSEOUS STRUCTURES/SOFT TISSUES: No evidence for marrow infiltrative process. No diploic space, skull base/clivus or upper cervical marrow signal abnormality is noted. Patient is nasally and orally intubated. The major intracranial vascular flow voids   are maintained.      ORBITS: No abnormality accounting for technique. No pathologic orbital enhancement.     SINUSES/MASTOIDS: Air-fluid levels in the maxillary sinuses with partial opacification of the ethmoid air cells and sphenoid sinus. Fluid opacification in the nasopharynx related to patient's intubated status presumed. Complete/near complete   opacification of the mastoid air cells bilaterally. No apparent mass in the posterior nasopharynx or skull base.                                                                       IMPRESSION:  1.  Multifocal punctate and confluent areas of nonhemorrhagic acute to subacute ischemic change with concordant FLAIR hyperintensity as above. No corresponding enhancement abnormality. No mass effect, sulcal effacement or midline shift. No ventricular   effacement     2.  Cortical/subcortical involvement is identified right parietal level with punctate involvement noted elsewhere deep white matter posterior right frontal level, left frontal subcortical white matter and left periventricular temporal level.     3.  No pathologic enhancement is noted intracranially     4.  Satisfactory ventricular caliber     5.  Opacification mastoid air cells bilaterally. Air-fluid  level paranasal sinuses. Patient is nasally and orally intubated on today's examination.              Vaccine status unknown

## 2022-11-18 NOTE — PROGRESS NOTES
ICU Progress Note    Date of Service: 11/18/22    A/P:  57M cirrhosis, CARRILLO s/p liver txp (9/2016) admitted to ICU 11/12/22 after out of hospital PEA arrest and acute hypoxemic respiratory failure. Course c/b LORETTA requiring CRRT. Found to have Parainfluenza virus and Streptococcal bacteremia.      I have personally reviewed the daily labs, imaging studies, cultures and discussed the case with referring physician and consulting physicians.      Neuro  # Sedation for mechanical ventilation  # Post-arrest anoxic brain injury  - monitor propofol gtt  - fentanyl IVP prn   - neurocritical care c/s      Pulmonary  # Acute hypoxemic respiratory failure  # ARDS  # Bilateral pleural effusions  # Right pneumothorax - chest tube placement 11/16, R basilar PTX still present, may represent ex vacuo pneumothorax now that pleural fluid has been drained  - vent settings below  - supine  - methylprednisolone 62.5mg q12h  - albuterol q4h   - continue chest tube -20 cmH2O     Cardiac  # Out of hospital PEA arrest  # Cardiogenic shock  # Septic shock  # Therapeutic temperature management - rewarmed 11/14  - MAP > 65  - norepinephrine gtt     Renal  # LORETTA - in the setting of PEA arrest and shock state  - monitor UOP, Cr, I/O  - CRRT  - nephrology c/s       GI  # Protein calorie malnutrition   # CARRILLO s/p liver txp - 9/2016  - TF managed by RD   - holding tacrolimus, on admission level 3.6     Heme/Onc  # Leukocytosis - 2/2 sepsis and inflammatory response, profound increase 11/14, afebrile, however on CRRT  # Thrombocytopenia - in the setting of critical illness and now c/f HIT, 4T score 6  - monitor CBC   - sent HIT labs  - stop heparin gtt  - start argatroban     ID  # Streptococcal bacteremia  # Parainfluenza virus   - CTX 2g q12h - CNS dosing given that we cannot r/o CNS infection  - monitor Cx data      Endo  # Stress induced hyperglycemia   - monitor BG  - sliding scale insulin      PPX  1. DVT: argatroban    2. VAP: HOB 30 degrees,  chlorhexidine rinse  3. Stress Ulcer: PPI  4. Restraints: Nonviolent soft two point restraints required and necessary for patient safety and continued cares and good effect as patient continues to pull at necessary lines, tubes despite education and distraction. Will readdress daily.   5. Wound care - per unit routine   6. Feeding - TF  7. Family updated at bedside.    Interval Hx:  Chest tube kinked ON, repositioned. Norepi continues to downtrend.     Unable to obtain ROS 2/2 sedation/intubation.     /85   Pulse 77   Temp 96.9  F (36.1  C) (Temporal)   Resp 24   Ht 1.829 m (6')   Wt 103.7 kg (228 lb 9.9 oz)   SpO2 100%   BMI 31.01 kg/m    Gen: supine, NAD  Neuro: sedated, pupils equal, w/d to pain in all 4 extremities  HEENT: anicteric  Card: RRR  Pulm: clear b/l, anterior chest tube C/D/I, air-leak present   Abd: soft, non-distended  MSK: no edema, no acute joint abnormality  Skin: no obvious rash    Vent Mode: CMV/AC  (Continuous Mandatory Ventilation/ Assist Control)  FiO2 (%): 50 %  Resp Rate (Set): 24 breaths/min  Tidal Volume (Set, mL): 400 mL  PEEP (cm H2O): 10 cmH2O  Resp: 24        Intake/Output Summary (Last 24 hours) at 11/18/2022 0804  Last data filed at 11/18/2022 0700  Gross per 24 hour   Intake 1734.04 ml   Output 2056 ml   Net -321.96 ml       Labs: reviewed    Imaging: reviewed    Billing: Patient is critically ill. Total critical care time today, excluding procedures, was 45 minutes.    Ki Watts MD  Pulmonary Disease and Critical Care Medicine   [facility]

## 2022-11-18 NOTE — PROVIDER NOTIFICATION
Notified MD of chest tube placement concerns while changing dressing at 0430. Chest tube appeared to be pulling upward and bulging at skin. Lungs clear and no crepitus auscultated/palpated. O2 sats WDL. Chest x ray ordered. Notified MD at 0615 of results. MD adjusted tube, scant serous fluid drained. Chest x ray ordered per dr. nassar to confirm placement.

## 2022-11-18 NOTE — PROGRESS NOTES
[facility] Nurse Inpatient Assessment     Consulted for: Philtrum/Columella, bilateral buttock    Patient History (according to provider note(s):      57M cirrhosis, CARRILLO s/p liver txp (9/2016) admitted to ICU 11/12/22 after out of hospital PEA arrest and acute hypoxemic respiratory failure. Course c/b LORETTA requiring CRRT. Found to have Parainfluenza virus and Streptococcal bacteremia.     Areas Assessed:      Areas visualized during today's visit: Face and neck and Sacrum/coccyx    Pressure Injury Location: Philtrum/Columella    Last photo: 11/18/22 11/16/22  Philtrum/Columella    Right lateral nose 11/16    Wound type: Pressure Injury     Pressure Injury Stage: Deep Tissue Pressure Injury (DTPI), hospital acquired      This is a Medical Device Related Pressure Injury (MDRPI) due to ETT  Wound history/plan of care:  Wound noted before patient had been proned on 11/15 by respiratory. RT. RT had noted that ETAD was tight on patient and the NG was taped to the ETAD. RT had replaced the ETAD and placed a optifoam underneath as well as asked staff to secure NG tube with bridal.    Wound base: 100 % dried adherent scabbing     Palpation of the wound bed: normal      Drainage: small     Description of drainage: serosanguinous      Measurements (length x width x depth, in cm) 2  x 2.5  x  0 cm.right nose 0.7cm x 0.4cm x 0 cm      Tunneling N/A     Undermining N/A  Periwound skin: Ecchymosis      Color: normal and consistent with surrounding tissue      Temperature: normal   Odor: none  Pain: no grimacing or signs of discomfort, none  Pain intervention prior to dressing change: patient tolerated well  Treatment goal: Heal  and Protection  STATUS: evolving  Supplies ordered: supplies stored on unit and discussed with RN    Pressure Injury Location: Bilateral buttock    Last photo:11/18/22 11/16/22    Wound type: Pressure Injury and Friction     Pressure Injury Stage: 1, hospital  acquired      Wound history/plan of care:   Patient has tan nonblanchable tissue to bilateral buttock that appears more likely from friction of the two buttocks rubbing against one another at they mirror one another, this discoloration appears to be old and in the healing process. However there is nonblanchable reddened tissue laterally to the left buttock that appears new, possibly from linens under patient.    Wound description :  Left lateral buttock 5cm x 1cm x 0 cm Nonblanchable red epidermis.  Bilateral buttock mirrored 100 % non-blanchable and dark tan discolored epidermis that feels thin and dry with some dermis exposed  8.5cm x 2cm x 0 cm to right buttock and 8.5 cm x 1.5cm x 0 cm to left buttock. Small scab over coccyx 0.3cm x 0.3cmx 0.1cm     Palpation of the wound bed: normal      Drainage: none     Description of drainage: none     Measurements (length x width x depth, in cm): see above     Tunneling N/A     Undermining N/A  Periwound skin: Intact      Color: normal and consistent with surrounding tissue      Temperature: normal   Odor: none  Pain: absent and denies , none  Pain intervention prior to dressing change: patient tolerated well  Treatment goal: Heal  and Protection  STATUS: stable  Supplies ordered: supplies stored on unit and discussed with RN      My PI Risk Assessment     Sensory Perception: 1 - Completely Limited     Moisture: 3 - Occasionally moist      Activity: 1 - Bedfast      Mobility: 1 - Completely immobile      Nutrition: 1 - Very poor     Friction/Shear: 1 - Problem     TOTAL: 8      Treatment Plan:     Columella/Philtrum/Nose wound(s): Every shift   1. Cleanse site with saline. Pat dry with gauze.  2. Apply vaseline to scabbing  3. Ensure Etad has Q tip clearance and place cut strip of Mepilex 4x4 or Optifoam under ETAD to ensure offloading.    Bilateral buttock wounds: Every other day or PRN with soiling  1. Clean wound with saline or MicroKlenz Spray, pat dry  2. Wipe /  "\"clean\" the surrounding periwound tissue with skin prep (Cavilon No Sting Skin Prep #543086) and allow to dry. This will help protect periwound and help dressing adherence  3. Press a Mepilex Sacral the long way over bilateral buttock to not cover anus, making sure to conform nicely to skin curvatures.   4. Time and date dressing change  NOTE  Pressure Injury prevention (please order supplies if not in room)  1. Turn/reposition every 1-2 hrs  2.   Float heels off bed with use of pillows or if needed Heel Lift boots (Prevalon #172409)  3.   If incontinent Cleanse with incontinent cleanser (Yara spray #[id number]) followed by skin barrier protectant (Critic Aid paste)  BID and after each incontinent episode  4.   Prevent sliding and shear by limiting HOB to 30 degrees or less unless contraindicated, use knee gatch first if not contraindicated  5.   Chair cushion pressure redistribution as needed (#[id number]): please limit sitting to an hour at a time  6.   Optimize nutrition   7.   PULSATE low air loss mattress     Orders: Reviewed and Updated    RECOMMEND PRIMARY TEAM ORDER: None, at this time  Education provided: importance of repositioning, plan of care, Moisture management, Hygiene and Off-loading pressure  Discussed plan of care with: Nurse  WOC nurse follow-up plan: 1-2x weekly  Notify WOC if wound(s) deteriorate.  Nursing to notify the Provider(s) and re-consult the WOC Nurse if new skin concern.    DATA:     Current support surface: Standard  Low air loss mattress with pulsation , ICU  Containment of urine/stool: Incontinent pad in bed  BMI: Body mass index is 31.01 kg/m .   Active diet order: Orders Placed This Encounter      NPO for Medical/Clinical Reasons Except for: Meds, Ice Chips     Output: I/O last 3 completed shifts:  In: 1811.64 [I.V.:870.64; Other:6; NG/GT:435]  Out: 2195 [Urine:5; Other:2190]     Labs:   Recent Labs   Lab 11/18/22  0410 11/14/22  1141 11/14/22  1020   ALBUMIN 1.9*   < > 2.1*   HGB 9.5*   " < > 12.2*   INR  --   --  1.50*   WBC 30.2*   < > 58.9*    < > = values in this interval not displayed.     Pressure injury risk assessment:   Sensory Perception: 1-->completely limited  Moisture: 3-->occasionally moist  Activity: 1-->bedfast  Mobility: 1-->completely immobile  Nutrition: 2-->probably inadequate  Friction and Shear: 2-->potential problem  Kareem Score: 10    Ashleigh Briggs [facility]   Dept. Pager: [phone]  Dept. Office Number: [phone]

## 2022-11-18 NOTE — PLAN OF CARE
Care provided from [id number]    Neuro: sedation off. Patient withdrawing from painful stimulus but not opening eyes.   CV: SR with PVC's. Blood pressure supported with low dose levo.   Pulm: remains intubated. Fi02 50%. Chest tube did not have any output. Coarse lung sounds  GI/: straight cath x1 today. Hypoactive bowel sounds.  CRRT: pulling net 0  Additional: MRI complete.

## 2022-11-18 NOTE — PROGRESS NOTES
Renal Medicine Progress Note            Assessment/Plan:     Blanco Osborne is a 57-year-old male with PMH CARRILLO s/p liver transplant (9/2016) and cirrhosis admitted 11/12/2022 with out of hospital PEA arrest and acute hypoxemic respiratory failure.  Course complicated by parainfluenza virus, streptococcal bacteremia, and LORETTA requiring CRRT.     Anuric LORETTA  Due to ATN in setting of PEA arrest and persistent shock.  Urine output negligible. CRRT started for anuric ATN.   -CRRT  ----Volume goal net negative 0-100cc/h   ---- BREDNA sandy   ---- clotting issues resolved with heparin but now with thrombocytopenia, HIT assay pending.  Switching to argatroban  -Argatroban  -Every 8 hours CRRT labs  -Renally dose meds    PEA arrest  Septic shock, strep pneumo bacteremia  Cardiogenic shock  Currently on Levophed and vasopressin.  Rewarmed 11/14. Concern for anoxic brain injury. Neuro crit assessing.     Lactic acidosis, resolved  Improved with CRRT.    ESLD due to CARRILLO s/p liver transplant   - hold tacrolimus  -Tac trough 3.2    Hypoxic respiratory failure  ARDS  Vent management per ICU.  High FiO2 requirements with net positive fluid balance despite CRRT.  Attempting net even on CRRT if able.    R Pneumothorax   Bilateral pleural effusions  CT placed 11/16 with large output.     FEN  Electrolytes improved with CRRT. Starting UF net negative.       Dwayne Laboy MD   [facility]  Office: [phone]          Interval History:     -Worsening thrombocytopenia, HIT assay pending, switching heparin to argatroban  -Tolerating CRRT well, stable pressor requirements.  Maintaining net 0  -No significant urine output  -Stable vent settings         Medications and Allergies:       albuterol  2.5 mg Nebulization Q6H     artificial tears  1 drop Both Eyes TID     [START ON 11/19/2022] cefTRIAXone  2 g Intravenous Q24H     chlorhexidine  15 mL Mouth/Throat Q12H     insulin aspart  1-12 Units Subcutaneous Q4H      methylPREDNISolone  62.5 mg Intravenous Q12H     multivitamins w/minerals  15 mL Per Feeding Tube Daily     pantoprazole  40 mg Per Feeding Tube QAM AC    Or     pantoprazole  40 mg Intravenous QAM AC     polyethylene glycol  17 g Oral Daily     senna-docusate  1 tablet Oral BID    Or     senna-docusate  2 tablet Oral BID      No Known Allergies         Physical Exam:   Vitals were reviewed  /85   Pulse 82   Temp 98.9  F (37.2  C) (Temporal)   Resp 24   Ht 1.829 m (6')   Wt 103.7 kg (228 lb 9.9 oz)   SpO2 99%   BMI 31.01 kg/m      Wt Readings from Last 3 Encounters:   11/18/22 103.7 kg (228 lb 9.9 oz)   10/07/19 137.5 kg (303 lb 3.2 oz)   10/04/19 131.5 kg (290 lb)       Intake/Output Summary (Last 24 hours) at 11/14/2022 1136  Last data filed at 11/14/2022 1100  Gross per 24 hour   Intake 4097.12 ml   Output 2345 ml   Net 1752.12 ml       GENERAL APPEARANCE: intubated, sedated   HEENT:  ETT in place  RESP: coarse bilaterally   CV: RRR  ABDOMEN: soft, nontender, nondistended   EXTREMITIES/SKIN: 1-2+ LE edema, improvement in upper extremity edema  NEURO:  sedated, eyes open but not tracking  LINES:  + gomez, right internal jugular dialysis catheter           Data:     BMP  Recent Labs   Lab 11/18/22  1225 11/18/22  0935 11/18/22  0410 11/18/22  0409 11/17/22  2358 11/17/22  2031 11/17/22  1208 11/17/22  1006     --  135  --   --  138  --  137   POTASSIUM 3.9  --  3.7  --   --  3.8  --  3.8   CHLORIDE 105  --  104  --   --  105  --  104   KELLY 8.2*  --  8.4*  --   --  8.4*  --  8.5   CO2 23  --  25  --   --  21  --  19*   BUN 31*  --  34*  --   --  38*  --  33*   CR 1.00  --  1.09  --   --  1.15  --  1.10   * 202* 194* 178*   < > 212*   < > 178*    < > = values in this interval not displayed.     CBC  Recent Labs   Lab 11/18/22  1225 11/18/22  0410 11/17/22 2031 11/17/22  1006   WBC 26.1* 30.2* 32.5* 37.6*   HGB 9.3* 9.5* 9.5* 10.1*   HCT 28.4* 28.9* 28.4* 29.3*   MCV 99 99 99 95   PLT 41*  45* 54* 63*     Lab Results   Component Value Date    AST 35 11/18/2022    ALT 35 11/18/2022    ALKPHOS 168 (H) 11/18/2022    BILITOTAL 2.1 (H) 11/18/2022     Lab Results   Component Value Date    INR 1.50 (H) 11/14/2022       Attestation:  I have reviewed today's vital signs, notes, medications, labs and imaging.    Dwayne Laboy MD  [facility]  Office: [phone]

## 2022-11-18 NOTE — PROGRESS NOTES
Spoke with pharmacist regarding timing of heparin change to argatroban. Per pharmacist, stop heparin now and start argatroban 3 hours after heparin stop. PTT to be sent 2 hours after start of agratrogan

## 2022-11-19 ENCOUNTER — APPOINTMENT (OUTPATIENT)
Dept: GENERAL RADIOLOGY | Facility: CLINIC | Age: 57
DRG: 004 | End: 2022-11-19
Attending: STUDENT IN AN ORGANIZED HEALTH CARE EDUCATION/TRAINING PROGRAM
Payer: COMMERCIAL

## 2022-11-19 ENCOUNTER — APPOINTMENT (OUTPATIENT)
Dept: CT IMAGING | Facility: CLINIC | Age: 57
DRG: 004 | End: 2022-11-19
Attending: ANESTHESIOLOGY
Payer: COMMERCIAL

## 2022-11-19 PROBLEM — Z51.81 ENCOUNTER FOR THERAPEUTIC DRUG MONITORING: Status: ACTIVE | Noted: 2022-11-19

## 2022-11-19 PROBLEM — B34.8 PARAINFLUENZA TYPE 1 INFECTION: Status: ACTIVE | Noted: 2022-11-19

## 2022-11-19 PROBLEM — B44.9 ASPERGILLUS PNEUMONIA (H): Status: ACTIVE | Noted: 2022-11-19

## 2022-11-19 PROBLEM — D84.9 ACQUIRED IMMUNOCOMPROMISED STATE (H): Status: ACTIVE | Noted: 2022-11-19

## 2022-11-19 PROBLEM — R65.20: Status: ACTIVE | Noted: 2022-11-19

## 2022-11-19 PROBLEM — J96.01: Status: ACTIVE | Noted: 2022-11-19

## 2022-11-19 PROBLEM — A40.3: Status: ACTIVE | Noted: 2022-11-19

## 2022-11-19 PROBLEM — B44.89: Status: ACTIVE | Noted: 2022-11-19

## 2022-11-19 LAB
ALBUMIN SERPL-MCNC: 1.8 G/DL (ref 3.4–5)
ALBUMIN SERPL-MCNC: 1.9 G/DL (ref 3.4–5)
ALBUMIN SERPL-MCNC: 2 G/DL (ref 3.4–5)
ALP SERPL-CCNC: 182 U/L (ref 40–150)
ALT SERPL W P-5'-P-CCNC: 32 U/L (ref 0–70)
ANION GAP SERPL CALCULATED.3IONS-SCNC: 10 MMOL/L (ref 3–14)
ANION GAP SERPL CALCULATED.3IONS-SCNC: 6 MMOL/L (ref 3–14)
ANION GAP SERPL CALCULATED.3IONS-SCNC: 7 MMOL/L (ref 3–14)
ANION GAP SERPL CALCULATED.3IONS-SCNC: 8 MMOL/L (ref 3–14)
APTT PPP: 84 SECONDS (ref 22–38)
AST SERPL W P-5'-P-CCNC: 30 U/L (ref 0–45)
BACTERIA BLD CULT: NO GROWTH
BASE EXCESS BLDA CALC-SCNC: 0.5 MMOL/L (ref -9–1.8)
BILIRUB DIRECT SERPL-MCNC: 1.1 MG/DL (ref 0–0.2)
BILIRUB SERPL-MCNC: 1.5 MG/DL (ref 0.2–1.3)
BUN SERPL-MCNC: 30 MG/DL (ref 7–30)
BUN SERPL-MCNC: 31 MG/DL (ref 7–30)
BUN SERPL-MCNC: 32 MG/DL (ref 7–30)
BUN SERPL-MCNC: 34 MG/DL (ref 7–30)
CA-I BLD-MCNC: 4.6 MG/DL (ref 4.4–5.2)
CA-I BLD-MCNC: 4.7 MG/DL (ref 4.4–5.2)
CA-I BLD-MCNC: 4.7 MG/DL (ref 4.4–5.2)
CALCIUM SERPL-MCNC: 7.7 MG/DL (ref 8.5–10.1)
CALCIUM SERPL-MCNC: 8 MG/DL (ref 8.5–10.1)
CALCIUM SERPL-MCNC: 8.1 MG/DL (ref 8.5–10.1)
CALCIUM SERPL-MCNC: 8.3 MG/DL (ref 8.5–10.1)
CHLORIDE BLD-SCNC: 105 MMOL/L (ref 94–109)
CHLORIDE BLD-SCNC: 105 MMOL/L (ref 94–109)
CHLORIDE BLD-SCNC: 106 MMOL/L (ref 94–109)
CHLORIDE BLD-SCNC: 106 MMOL/L (ref 94–109)
CO2 SERPL-SCNC: 22 MMOL/L (ref 20–32)
CO2 SERPL-SCNC: 23 MMOL/L (ref 20–32)
CO2 SERPL-SCNC: 23 MMOL/L (ref 20–32)
CO2 SERPL-SCNC: 24 MMOL/L (ref 20–32)
CREAT SERPL-MCNC: 0.84 MG/DL (ref 0.66–1.25)
CREAT SERPL-MCNC: 0.87 MG/DL (ref 0.66–1.25)
CREAT SERPL-MCNC: 0.9 MG/DL (ref 0.66–1.25)
CREAT SERPL-MCNC: 1 MG/DL (ref 0.66–1.25)
ERYTHROCYTE [DISTWIDTH] IN BLOOD BY AUTOMATED COUNT: 14.4 % (ref 10–15)
ERYTHROCYTE [DISTWIDTH] IN BLOOD BY AUTOMATED COUNT: 14.6 % (ref 10–15)
ERYTHROCYTE [DISTWIDTH] IN BLOOD BY AUTOMATED COUNT: 14.6 % (ref 10–15)
GFR SERPL CREATININE-BSD FRML MDRD: 87 ML/MIN/1.73M2
GFR SERPL CREATININE-BSD FRML MDRD: >90 ML/MIN/1.73M2
GLUCOSE BLD-MCNC: 164 MG/DL (ref 70–99)
GLUCOSE BLD-MCNC: 168 MG/DL (ref 70–99)
GLUCOSE BLD-MCNC: 203 MG/DL (ref 70–99)
GLUCOSE BLD-MCNC: 208 MG/DL (ref 70–99)
GLUCOSE BLDC GLUCOMTR-MCNC: 138 MG/DL (ref 70–99)
GLUCOSE BLDC GLUCOMTR-MCNC: 145 MG/DL (ref 70–99)
GLUCOSE BLDC GLUCOMTR-MCNC: 149 MG/DL (ref 70–99)
GLUCOSE BLDC GLUCOMTR-MCNC: 152 MG/DL (ref 70–99)
GLUCOSE BLDC GLUCOMTR-MCNC: 152 MG/DL (ref 70–99)
GLUCOSE BLDC GLUCOMTR-MCNC: 160 MG/DL (ref 70–99)
GLUCOSE BLDC GLUCOMTR-MCNC: 163 MG/DL (ref 70–99)
GLUCOSE BLDC GLUCOMTR-MCNC: 179 MG/DL (ref 70–99)
GLUCOSE BLDC GLUCOMTR-MCNC: 188 MG/DL (ref 70–99)
GLUCOSE BLDC GLUCOMTR-MCNC: 192 MG/DL (ref 70–99)
GLUCOSE BLDC GLUCOMTR-MCNC: 210 MG/DL (ref 70–99)
GLUCOSE BLDC GLUCOMTR-MCNC: 218 MG/DL (ref 70–99)
GLUCOSE BLDC GLUCOMTR-MCNC: 238 MG/DL (ref 70–99)
HBA1C MFR BLD: 4.7 % (ref 0–5.6)
HCO3 BLD-SCNC: 25 MMOL/L (ref 21–28)
HCT VFR BLD AUTO: 26.9 % (ref 40–53)
HCT VFR BLD AUTO: 29.6 % (ref 40–53)
HCT VFR BLD AUTO: 29.9 % (ref 40–53)
HGB BLD-MCNC: 8.8 G/DL (ref 13.3–17.7)
HGB BLD-MCNC: 9.8 G/DL (ref 13.3–17.7)
HGB BLD-MCNC: 9.9 G/DL (ref 13.3–17.7)
HOLD SPECIMEN HIT: NORMAL
MAGNESIUM SERPL-MCNC: 2.1 MG/DL (ref 1.6–2.3)
MAGNESIUM SERPL-MCNC: 2.2 MG/DL (ref 1.6–2.3)
MAGNESIUM SERPL-MCNC: 2.3 MG/DL (ref 1.6–2.3)
MAGNESIUM SERPL-MCNC: 2.3 MG/DL (ref 1.6–2.3)
MCH RBC QN AUTO: 32.8 PG (ref 26.5–33)
MCH RBC QN AUTO: 32.8 PG (ref 26.5–33)
MCH RBC QN AUTO: 33.2 PG (ref 26.5–33)
MCHC RBC AUTO-ENTMCNC: 32.7 G/DL (ref 31.5–36.5)
MCHC RBC AUTO-ENTMCNC: 33.1 G/DL (ref 31.5–36.5)
MCHC RBC AUTO-ENTMCNC: 33.1 G/DL (ref 31.5–36.5)
MCV RBC AUTO: 100 FL (ref 78–100)
MCV RBC AUTO: 100 FL (ref 78–100)
MCV RBC AUTO: 99 FL (ref 78–100)
O2/TOTAL GAS SETTING VFR VENT: 35 %
PCO2 BLD: 38 MM HG (ref 35–45)
PF4 HEPARIN CMPLX AB SER QL: NEGATIVE
PH BLD: 7.42 [PH] (ref 7.35–7.45)
PHOSPHATE SERPL-MCNC: 1.9 MG/DL (ref 2.5–4.5)
PHOSPHATE SERPL-MCNC: 2.1 MG/DL (ref 2.5–4.5)
PHOSPHATE SERPL-MCNC: 2.1 MG/DL (ref 2.5–4.5)
PHOSPHATE SERPL-MCNC: 2.2 MG/DL (ref 2.5–4.5)
PLATELET # BLD AUTO: 39 10E3/UL (ref 150–450)
PLATELET # BLD AUTO: 95 10E3/UL (ref 150–450)
PLATELET # BLD AUTO: 98 10E3/UL (ref 150–450)
PO2 BLD: 154 MM HG (ref 80–105)
POTASSIUM BLD-SCNC: 3.8 MMOL/L (ref 3.4–5.3)
POTASSIUM BLD-SCNC: 4.3 MMOL/L (ref 3.4–5.3)
PROT SERPL-MCNC: 5.2 G/DL (ref 6.8–8.8)
RBC # BLD AUTO: 2.68 10E6/UL (ref 4.4–5.9)
RBC # BLD AUTO: 2.98 10E6/UL (ref 4.4–5.9)
RBC # BLD AUTO: 2.99 10E6/UL (ref 4.4–5.9)
SARS-COV-2 RNA RESP QL NAA+PROBE: NEGATIVE
SODIUM SERPL-SCNC: 135 MMOL/L (ref 133–144)
SODIUM SERPL-SCNC: 136 MMOL/L (ref 133–144)
SODIUM SERPL-SCNC: 136 MMOL/L (ref 133–144)
SODIUM SERPL-SCNC: 138 MMOL/L (ref 133–144)
WBC # BLD AUTO: 23.2 10E3/UL (ref 4–11)
WBC # BLD AUTO: 51.8 10E3/UL (ref 4–11)
WBC # BLD AUTO: 53.1 10E3/UL (ref 4–11)

## 2022-11-19 PROCEDURE — 250N000011 HC RX IP 250 OP 636: Performed by: STUDENT IN AN ORGANIZED HEALTH CARE EDUCATION/TRAINING PROGRAM

## 2022-11-19 PROCEDURE — 250N000013 HC RX MED GY IP 250 OP 250 PS 637: Performed by: STUDENT IN AN ORGANIZED HEALTH CARE EDUCATION/TRAINING PROGRAM

## 2022-11-19 PROCEDURE — 94003 VENT MGMT INPAT SUBQ DAY: CPT

## 2022-11-19 PROCEDURE — 85014 HEMATOCRIT: CPT | Performed by: STUDENT IN AN ORGANIZED HEALTH CARE EDUCATION/TRAINING PROGRAM

## 2022-11-19 PROCEDURE — 258N000003 HC RX IP 258 OP 636: Performed by: INTERNAL MEDICINE

## 2022-11-19 PROCEDURE — 83036 HEMOGLOBIN GLYCOSYLATED A1C: CPT | Performed by: ANESTHESIOLOGY

## 2022-11-19 PROCEDURE — 99291 CRITICAL CARE FIRST HOUR: CPT | Mod: FS | Performed by: PSYCHIATRY & NEUROLOGY

## 2022-11-19 PROCEDURE — 83735 ASSAY OF MAGNESIUM: CPT | Performed by: STUDENT IN AN ORGANIZED HEALTH CARE EDUCATION/TRAINING PROGRAM

## 2022-11-19 PROCEDURE — 99291 CRITICAL CARE FIRST HOUR: CPT | Performed by: STUDENT IN AN ORGANIZED HEALTH CARE EDUCATION/TRAINING PROGRAM

## 2022-11-19 PROCEDURE — 82248 BILIRUBIN DIRECT: CPT | Performed by: INTERNAL MEDICINE

## 2022-11-19 PROCEDURE — 70450 CT HEAD/BRAIN W/O DYE: CPT

## 2022-11-19 PROCEDURE — 85730 THROMBOPLASTIN TIME PARTIAL: CPT | Performed by: STUDENT IN AN ORGANIZED HEALTH CARE EDUCATION/TRAINING PROGRAM

## 2022-11-19 PROCEDURE — U0003 INFECTIOUS AGENT DETECTION BY NUCLEIC ACID (DNA OR RNA); SEVERE ACUTE RESPIRATORY SYNDROME CORONAVIRUS 2 (SARS-COV-2) (CORONAVIRUS DISEASE [COVID-19]), AMPLIFIED PROBE TECHNIQUE, MAKING USE OF HIGH THROUGHPUT TECHNOLOGIES AS DESCRIBED BY CMS-2020-01-R: HCPCS | Performed by: STUDENT IN AN ORGANIZED HEALTH CARE EDUCATION/TRAINING PROGRAM

## 2022-11-19 PROCEDURE — 84100 ASSAY OF PHOSPHORUS: CPT | Performed by: STUDENT IN AN ORGANIZED HEALTH CARE EDUCATION/TRAINING PROGRAM

## 2022-11-19 PROCEDURE — 93005 ELECTROCARDIOGRAM TRACING: CPT

## 2022-11-19 PROCEDURE — 250N000009 HC RX 250: Performed by: INTERNAL MEDICINE

## 2022-11-19 PROCEDURE — 258N000003 HC RX IP 258 OP 636: Performed by: STUDENT IN AN ORGANIZED HEALTH CARE EDUCATION/TRAINING PROGRAM

## 2022-11-19 PROCEDURE — 258N000003 HC RX IP 258 OP 636: Performed by: ANESTHESIOLOGY

## 2022-11-19 PROCEDURE — 94640 AIRWAY INHALATION TREATMENT: CPT | Mod: 76

## 2022-11-19 PROCEDURE — 84450 TRANSFERASE (AST) (SGOT): CPT | Performed by: INTERNAL MEDICINE

## 2022-11-19 PROCEDURE — 250N000011 HC RX IP 250 OP 636: Performed by: SPECIALIST

## 2022-11-19 PROCEDURE — 93010 ELECTROCARDIOGRAM REPORT: CPT | Performed by: INTERNAL MEDICINE

## 2022-11-19 PROCEDURE — 250N000009 HC RX 250: Performed by: STUDENT IN AN ORGANIZED HEALTH CARE EDUCATION/TRAINING PROGRAM

## 2022-11-19 PROCEDURE — 999N000009 HC STATISTIC AIRWAY CARE

## 2022-11-19 PROCEDURE — C9113 INJ PANTOPRAZOLE SODIUM, VIA: HCPCS | Performed by: STUDENT IN AN ORGANIZED HEALTH CARE EDUCATION/TRAINING PROGRAM

## 2022-11-19 PROCEDURE — 71045 X-RAY EXAM CHEST 1 VIEW: CPT

## 2022-11-19 PROCEDURE — 82310 ASSAY OF CALCIUM: CPT | Performed by: STUDENT IN AN ORGANIZED HEALTH CARE EDUCATION/TRAINING PROGRAM

## 2022-11-19 PROCEDURE — 250N000012 HC RX MED GY IP 250 OP 636 PS 637: Performed by: ANESTHESIOLOGY

## 2022-11-19 PROCEDURE — 94640 AIRWAY INHALATION TREATMENT: CPT

## 2022-11-19 PROCEDURE — 85027 COMPLETE CBC AUTOMATED: CPT | Performed by: STUDENT IN AN ORGANIZED HEALTH CARE EDUCATION/TRAINING PROGRAM

## 2022-11-19 PROCEDURE — 99232 SBSQ HOSP IP/OBS MODERATE 35: CPT | Performed by: STUDENT IN AN ORGANIZED HEALTH CARE EDUCATION/TRAINING PROGRAM

## 2022-11-19 PROCEDURE — 200N000001 HC R&B ICU

## 2022-11-19 PROCEDURE — 82247 BILIRUBIN TOTAL: CPT | Performed by: INTERNAL MEDICINE

## 2022-11-19 PROCEDURE — 999N000157 HC STATISTIC RCP TIME EA 10 MIN

## 2022-11-19 PROCEDURE — 82330 ASSAY OF CALCIUM: CPT | Performed by: STUDENT IN AN ORGANIZED HEALTH CARE EDUCATION/TRAINING PROGRAM

## 2022-11-19 PROCEDURE — 82803 BLOOD GASES ANY COMBINATION: CPT | Performed by: STUDENT IN AN ORGANIZED HEALTH CARE EDUCATION/TRAINING PROGRAM

## 2022-11-19 RX ORDER — NICOTINE POLACRILEX 4 MG
15-30 LOZENGE BUCCAL
Status: DISCONTINUED | OUTPATIENT
Start: 2022-11-19 | End: 2022-11-21

## 2022-11-19 RX ORDER — METHYLPREDNISOLONE SODIUM SUCCINATE 125 MG/2ML
60 INJECTION, POWDER, LYOPHILIZED, FOR SOLUTION INTRAMUSCULAR; INTRAVENOUS EVERY 24 HOURS
Status: DISCONTINUED | OUTPATIENT
Start: 2022-11-20 | End: 2022-11-21

## 2022-11-19 RX ORDER — PHENYLEPHRINE HCL IN 0.9% NACL 50MG/250ML
.1-6 PLASTIC BAG, INJECTION (ML) INTRAVENOUS CONTINUOUS
Status: DISCONTINUED | OUTPATIENT
Start: 2022-11-19 | End: 2022-11-20

## 2022-11-19 RX ORDER — VORICONAZOLE 40 MG/ML
300 POWDER, FOR SUSPENSION ORAL EVERY 12 HOURS SCHEDULED
Status: DISCONTINUED | OUTPATIENT
Start: 2022-11-20 | End: 2022-11-20

## 2022-11-19 RX ORDER — DEXTROSE MONOHYDRATE 25 G/50ML
25-50 INJECTION, SOLUTION INTRAVENOUS
Status: DISCONTINUED | OUTPATIENT
Start: 2022-11-19 | End: 2022-11-21

## 2022-11-19 RX ORDER — SODIUM CHLORIDE 9 MG/ML
INJECTION, SOLUTION INTRAVENOUS CONTINUOUS
Status: DISCONTINUED | OUTPATIENT
Start: 2022-11-19 | End: 2022-12-04

## 2022-11-19 RX ORDER — OXYCODONE HYDROCHLORIDE 5 MG/1
5 TABLET ORAL EVERY 4 HOURS PRN
Status: DISCONTINUED | OUTPATIENT
Start: 2022-11-19 | End: 2022-11-28

## 2022-11-19 RX ORDER — DEXTROSE MONOHYDRATE 100 MG/ML
INJECTION, SOLUTION INTRAVENOUS CONTINUOUS PRN
Status: DISCONTINUED | OUTPATIENT
Start: 2022-11-19 | End: 2022-12-15 | Stop reason: HOSPADM

## 2022-11-19 RX ADMIN — CALCIUM CHLORIDE, MAGNESIUM CHLORIDE, SODIUM CHLORIDE, SODIUM BICARBONATE, POTASSIUM CHLORIDE AND SODIUM PHOSPHATE DIBASIC DIHYDRATE 5000 ML: 3.68; 3.05; 6.34; 3.09; .314; .187 INJECTION INTRAVENOUS at 14:00

## 2022-11-19 RX ADMIN — CALCIUM CHLORIDE, MAGNESIUM CHLORIDE, SODIUM CHLORIDE, SODIUM BICARBONATE, POTASSIUM CHLORIDE AND SODIUM PHOSPHATE DIBASIC DIHYDRATE 5000 ML: 3.68; 3.05; 6.34; 3.09; .314; .187 INJECTION INTRAVENOUS at 08:00

## 2022-11-19 RX ADMIN — CALCIUM CHLORIDE, MAGNESIUM CHLORIDE, DEXTROSE MONOHYDRATE, LACTIC ACID, SODIUM CHLORIDE, SODIUM BICARBONATE AND POTASSIUM CHLORIDE: 5.15; 2.03; 22; 5.4; 6.46; 3.09; .157 INJECTION INTRAVENOUS at 05:10

## 2022-11-19 RX ADMIN — SODIUM CHLORIDE 600 MG: 9 INJECTION, SOLUTION INTRAVENOUS at 12:14

## 2022-11-19 RX ADMIN — SENNOSIDES AND DOCUSATE SODIUM 2 TABLET: 50; 8.6 TABLET ORAL at 08:12

## 2022-11-19 RX ADMIN — ARGATROBAN 0.5 MCG/KG/MIN: 50 INJECTION, SOLUTION INTRAVENOUS at 05:05

## 2022-11-19 RX ADMIN — Medication 1 DROP: at 08:12

## 2022-11-19 RX ADMIN — FENTANYL CITRATE 50 MCG: 50 INJECTION, SOLUTION INTRAMUSCULAR; INTRAVENOUS at 03:51

## 2022-11-19 RX ADMIN — CEFTRIAXONE SODIUM 2 G: 2 INJECTION, POWDER, FOR SOLUTION INTRAMUSCULAR; INTRAVENOUS at 04:56

## 2022-11-19 RX ADMIN — POLYETHYLENE GLYCOL 3350 17 G: 17 POWDER, FOR SOLUTION ORAL at 08:12

## 2022-11-19 RX ADMIN — SODIUM CHLORIDE: 9 INJECTION, SOLUTION INTRAVENOUS at 10:50

## 2022-11-19 RX ADMIN — CALCIUM CHLORIDE, MAGNESIUM CHLORIDE, DEXTROSE MONOHYDRATE, LACTIC ACID, SODIUM CHLORIDE, SODIUM BICARBONATE AND POTASSIUM CHLORIDE 5000 ML: 5.15; 2.03; 22; 5.4; 6.46; 3.09; .157 INJECTION INTRAVENOUS at 15:50

## 2022-11-19 RX ADMIN — AMIODARONE HYDROCHLORIDE 1 MG/MIN: 50 INJECTION, SOLUTION INTRAVENOUS at 10:34

## 2022-11-19 RX ADMIN — CALCIUM CHLORIDE, MAGNESIUM CHLORIDE, SODIUM CHLORIDE, SODIUM BICARBONATE, POTASSIUM CHLORIDE AND SODIUM PHOSPHATE DIBASIC DIHYDRATE 5000 ML: 3.68; 3.05; 6.34; 3.09; .314; .187 INJECTION INTRAVENOUS at 16:58

## 2022-11-19 RX ADMIN — FENTANYL CITRATE 25 MCG: 50 INJECTION INTRAMUSCULAR; INTRAVENOUS at 10:08

## 2022-11-19 RX ADMIN — CALCIUM CHLORIDE, MAGNESIUM CHLORIDE, SODIUM CHLORIDE, SODIUM BICARBONATE, POTASSIUM CHLORIDE AND SODIUM PHOSPHATE DIBASIC DIHYDRATE 5000 ML: 3.68; 3.05; 6.34; 3.09; .314; .187 INJECTION INTRAVENOUS at 10:00

## 2022-11-19 RX ADMIN — CALCIUM CHLORIDE, MAGNESIUM CHLORIDE, DEXTROSE MONOHYDRATE, LACTIC ACID, SODIUM CHLORIDE, SODIUM BICARBONATE AND POTASSIUM CHLORIDE 5000 ML: 5.15; 2.03; 22; 5.4; 6.46; 3.09; .157 INJECTION INTRAVENOUS at 05:06

## 2022-11-19 RX ADMIN — Medication 15 ML: at 08:12

## 2022-11-19 RX ADMIN — AMIODARONE HYDROCHLORIDE 0.5 MG/MIN: 50 INJECTION, SOLUTION INTRAVENOUS at 16:51

## 2022-11-19 RX ADMIN — Medication 0.03 MCG/KG/MIN: at 09:29

## 2022-11-19 RX ADMIN — PANTOPRAZOLE SODIUM 40 MG: 40 INJECTION, POWDER, FOR SOLUTION INTRAVENOUS at 06:48

## 2022-11-19 RX ADMIN — SODIUM CHLORIDE 3 UNITS/HR: 9 INJECTION, SOLUTION INTRAVENOUS at 20:17

## 2022-11-19 RX ADMIN — SODIUM CHLORIDE 5.5 UNITS/HR: 9 INJECTION, SOLUTION INTRAVENOUS at 07:35

## 2022-11-19 RX ADMIN — CALCIUM CHLORIDE, MAGNESIUM CHLORIDE, SODIUM CHLORIDE, SODIUM BICARBONATE, POTASSIUM CHLORIDE AND SODIUM PHOSPHATE DIBASIC DIHYDRATE 5000 ML: 3.68; 3.05; 6.34; 3.09; .314; .187 INJECTION INTRAVENOUS at 21:47

## 2022-11-19 RX ADMIN — CALCIUM CHLORIDE, MAGNESIUM CHLORIDE, SODIUM CHLORIDE, SODIUM BICARBONATE, POTASSIUM CHLORIDE AND SODIUM PHOSPHATE DIBASIC DIHYDRATE 5000 ML: 3.68; 3.05; 6.34; 3.09; .314; .187 INJECTION INTRAVENOUS at 19:17

## 2022-11-19 RX ADMIN — FENTANYL CITRATE 50 MCG: 50 INJECTION INTRAMUSCULAR; INTRAVENOUS at 14:28

## 2022-11-19 RX ADMIN — CALCIUM CHLORIDE, MAGNESIUM CHLORIDE, DEXTROSE MONOHYDRATE, LACTIC ACID, SODIUM CHLORIDE, SODIUM BICARBONATE AND POTASSIUM CHLORIDE: 5.15; 2.03; 22; 5.4; 6.46; 3.09; .157 INJECTION INTRAVENOUS at 21:47

## 2022-11-19 RX ADMIN — ALBUTEROL SULFATE 2.5 MG: 2.5 SOLUTION RESPIRATORY (INHALATION) at 07:17

## 2022-11-19 RX ADMIN — Medication 1 DROP: at 22:29

## 2022-11-19 RX ADMIN — METHYLPREDNISOLONE SODIUM SUCCINATE 62.5 MG: 125 INJECTION, POWDER, FOR SOLUTION INTRAMUSCULAR; INTRAVENOUS at 10:27

## 2022-11-19 RX ADMIN — CALCIUM CHLORIDE, MAGNESIUM CHLORIDE, SODIUM CHLORIDE, SODIUM BICARBONATE, POTASSIUM CHLORIDE AND SODIUM PHOSPHATE DIBASIC DIHYDRATE: 3.68; 3.05; 6.34; 3.09; .314; .187 INJECTION INTRAVENOUS at 05:10

## 2022-11-19 RX ADMIN — SODIUM CHLORIDE 2.5 UNITS/HR: 9 INJECTION, SOLUTION INTRAVENOUS at 01:07

## 2022-11-19 RX ADMIN — CALCIUM CHLORIDE, MAGNESIUM CHLORIDE, SODIUM CHLORIDE, SODIUM BICARBONATE, POTASSIUM CHLORIDE AND SODIUM PHOSPHATE DIBASIC DIHYDRATE 5000 ML: 3.68; 3.05; 6.34; 3.09; .314; .187 INJECTION INTRAVENOUS at 02:28

## 2022-11-19 RX ADMIN — ALBUTEROL SULFATE 2.5 MG: 2.5 SOLUTION RESPIRATORY (INHALATION) at 02:18

## 2022-11-19 RX ADMIN — SODIUM CHLORIDE 4 UNITS/HR: 9 INJECTION, SOLUTION INTRAVENOUS at 12:47

## 2022-11-19 RX ADMIN — CHLORHEXIDINE GLUCONATE 0.12% ORAL RINSE 15 ML: 1.2 LIQUID ORAL at 20:17

## 2022-11-19 RX ADMIN — CALCIUM CHLORIDE, MAGNESIUM CHLORIDE, SODIUM CHLORIDE, SODIUM BICARBONATE, POTASSIUM CHLORIDE AND SODIUM PHOSPHATE DIBASIC DIHYDRATE 5000 ML: 3.68; 3.05; 6.34; 3.09; .314; .187 INJECTION INTRAVENOUS at 12:00

## 2022-11-19 RX ADMIN — Medication 1 DROP: at 15:41

## 2022-11-19 RX ADMIN — CHLORHEXIDINE GLUCONATE 0.12% ORAL RINSE 15 ML: 1.2 LIQUID ORAL at 07:35

## 2022-11-19 RX ADMIN — PHENYLEPHRINE HYDROCHLORIDE 0.5 MCG/KG/MIN: 10 INJECTION INTRAVENOUS at 18:10

## 2022-11-19 RX ADMIN — AMIODARONE HYDROCHLORIDE 150 MG: 1.5 INJECTION, SOLUTION INTRAVENOUS at 10:22

## 2022-11-19 RX ADMIN — SODIUM CHLORIDE: 9 INJECTION, SOLUTION INTRAVENOUS at 12:28

## 2022-11-19 RX ADMIN — ARGATROBAN 0.5 MCG/KG/MIN: 50 INJECTION, SOLUTION INTRAVENOUS at 20:17

## 2022-11-19 ASSESSMENT — ACTIVITIES OF DAILY LIVING (ADL)
ADLS_ACUITY_SCORE: 51

## 2022-11-19 NOTE — PROGRESS NOTES
Renal Medicine Progress Note            Assessment/Plan:     Blanco Osborne is a 57-year-old male with PMH CARRILLO s/p liver transplant (9/2016) and cirrhosis admitted 11/12/2022 with out of hospital PEA arrest and acute hypoxemic respiratory failure.  Course complicated by parainfluenza virus, streptococcal bacteremia, and LORETTA requiring CRRT.     Anuric LORETTA  Due to ATN in setting of PEA arrest and persistent shock.  Urine output negligible. CRRT started for anuric ATN.   -CRRT  ----Volume goal net negative 0-100cc/h   ---- BRENDA sandy   ---- clotting issues resolved with heparin but now with thrombocytopenia, HIT assay pending.  Switched to argatroban  -Argatroban  -Every 8 hours CRRT labs  -Renally dose meds    PEA arrest  Septic shock, strep pneumo bacteremia  Cardiogenic shock  Currently on Levophed and vasopressin.  Rewarmed 11/14. Concern for anoxic brain injury. Neuro crit assessing.     Lactic acidosis, resolved  Improved with CRRT.    ESLD due to CARRILLO s/p liver transplant   - hold tacrolimus  -Tac trough 3.2    Hypoxic respiratory failure  ARDS  Vent management per ICU.  High FiO2 requirements with net positive fluid balance despite CRRT.  Attempting net even on CRRT if able.    R Pneumothorax   Bilateral pleural effusions  Aspergillus PNA  CT placed 11/16 with large output.     FEN  Electrolytes improved with CRRT.       Dwayne Laboy MD   [facility]  Office: [phone]          Interval History:     - thrombocytopenia stable   - aspergillus growing in resp culture, starting voriconazole and weaning off of steroids   - CRRT going ok,had to back down on rate when patient had hypotensive episode requiring levophed bolus which resulted in conversion to afib w RVR  - amiodarone started  - no UOP         Medications and Allergies:       albuterol  2.5 mg Nebulization Q6H     artificial tears  1 drop Both Eyes TID     cefTRIAXone  2 g Intravenous Q24H     chlorhexidine  15 mL Mouth/Throat Q12H      [START ON 11/20/2022] methylPREDNISolone  62.5 mg Intravenous Q24H     multivitamins w/minerals  15 mL Per Feeding Tube Daily     pantoprazole  40 mg Per Feeding Tube QAM AC    Or     pantoprazole  40 mg Intravenous QAM AC     polyethylene glycol  17 g Oral Daily     senna-docusate  1 tablet Oral BID    Or     senna-docusate  2 tablet Oral BID     voriconazole  6 mg/kg Intravenous Q12H     [START ON 11/20/2022] voriconazole  300 mg Oral Q12H BIJU (08/20)      No Active Allergies         Physical Exam:   Vitals were reviewed  /81   Pulse (!) 147   Temp 97.6  F (36.4  C) (Axillary)   Resp 27   Ht 1.829 m (6')   Wt 101.3 kg (223 lb 5.2 oz)   SpO2 95%   BMI 30.29 kg/m      Wt Readings from Last 3 Encounters:   11/19/22 101.3 kg (223 lb 5.2 oz)   10/07/19 137.5 kg (303 lb 3.2 oz)   10/04/19 131.5 kg (290 lb)       Intake/Output Summary (Last 24 hours) at 11/14/2022 1136  Last data filed at 11/14/2022 1100  Gross per 24 hour   Intake 4097.12 ml   Output 2345 ml   Net 1752.12 ml       GENERAL APPEARANCE: intubated, sedated   HEENT:  ETT in place  RESP: coarse bilaterally   CV: RRR  ABDOMEN: soft, nontender, nondistended   EXTREMITIES/SKIN: 1-2+ LE edema, improvement in upper extremity edema  NEURO:  sedated, eyes open intermittently  LINES:   right internal jugular dialysis catheter           Data:     BMP  Recent Labs   Lab 11/19/22  1241 11/19/22  1238 11/19/22  1037 11/19/22  0851 11/19/22  0559 11/19/22  0554 11/19/22  0452 11/19/22  0447 11/18/22 2017 11/18/22 2014   NA  --  136  --   --   --  136  --  138  --  135   POTASSIUM  --  3.8  --   --   --  3.8  --  3.8  --  3.8   CHLORIDE  --  105  --   --   --  105  --  106  --  104   KELLY  --  8.3*  --   --   --  8.0*  --  7.7*  --  8.5   CO2  --  24  --   --   --  23  --  22  --  23   BUN  --  31*  --   --   --  32*  --  34*  --  30   CR  --  0.84  --   --   --  0.90  --  1.00  --  0.92   * 168* 210* 179*   < > 203*   < > 208*   < > 229*    < > =  values in this interval not displayed.     CBC  Recent Labs   Lab 11/19/22  1238 11/19/22  0447 11/18/22 2014 11/18/22  1225   WBC 53.1* 23.2* 27.5* 26.1*   HGB 9.8* 8.8* 9.7* 9.3*   HCT 29.6* 26.9* 29.3* 28.4*   MCV 99 100 99 99   PLT 95* 39* 40* 41*     Lab Results   Component Value Date    AST 30 11/19/2022    ALT 32 11/19/2022    ALKPHOS 182 (H) 11/19/2022    BILITOTAL 1.5 (H) 11/19/2022     Lab Results   Component Value Date    INR 1.50 (H) 11/14/2022       Attestation:  I have reviewed today's vital signs, notes, medications, labs and imaging.    Dwayne Laboy MD  [facility] - Nephrology  Office: [phone]

## 2022-11-19 NOTE — PLAN OF CARE
VC-AC 35% PEEP 10, R24  Argatroban, insulin (added this shift)    CRRT: filter changed x2 for CT transport and deaeration chamber clot  Tolerating max. ordered fluid removal rate  Unequal pupils noticed during bath at ~0330, no other neurologic changes observed, stat CT complete  Remains unable to follow commands  Bladder scan completed, 341 mL observed, fluid recorded may not be in bladder: straight catheterization yielded 5 mL urine  PLT consistently critically low  Ectopy infrequent  No BM    Problem: Risk for Delirium  Goal: Improved Attention and Thought Clarity  Outcome: Not Progressing     Problem: Plan of Care - These are the overarching goals to be used throughout the patient stay.    Goal: Optimal Comfort and Wellbeing  Outcome: Progressing  Intervention: Provide Person-Centered Care  Recent Flowsheet Documentation  Taken 11/19/2022 0400 by Jana Chu RN  Trust Relationship/Rapport:    care explained    reassurance provided  Taken 11/19/2022 0000 by Jana Chu RN  Trust Relationship/Rapport:    care explained    reassurance provided  Taken 11/18/2022 2000 by Jana Chu RN  Trust Relationship/Rapport:    care explained    questions answered    questions encouraged    thoughts/feelings acknowledged     Problem: Pneumonia  Goal: Fluid Balance  Outcome: Progressing  Goal: Effective Oxygenation and Ventilation  Outcome: Progressing  Intervention: Promote Airway Secretion Clearance  Recent Flowsheet Documentation  Taken 11/19/2022 0400 by Jana Chu RN  Cough And Deep Breathing: unable to perform  Taken 11/19/2022 0000 by Jana Chu RN  Cough And Deep Breathing: unable to perform  Taken 11/18/2022 2000 by Jana Chu RN  Cough And Deep Breathing: unable to perform  Intervention: Optimize Oxygenation and Ventilation  Recent Flowsheet Documentation  Taken 11/19/2022 0400 by Jana Chu RN  Head of Bed (HOB) Positioning: HOB at 30 degrees  Taken 11/19/2022 0200 by  Jana Chu RN  Head of Bed (HOB) Positioning: HOB at 30 degrees  Taken 11/19/2022 0000 by Jana Chu RN  Head of Bed (HOB) Positioning: HOB at 30 degrees  Taken 11/18/2022 2200 by Jana Chu RN  Head of Bed (HOB) Positioning: HOB at 30 degrees  Taken 11/18/2022 2000 by Jana Chu RN  Head of Bed (HOB) Positioning: HOB at 30 degrees     Problem: Plan of Care - These are the overarching goals to be used throughout the patient stay.    Goal: Absence of Hospital-Acquired Illness or Injury  Intervention: Identify and Manage Fall Risk  Recent Flowsheet Documentation  Taken 11/19/2022 0400 by Jana Chu RN  Safety Promotion/Fall Prevention:    bed alarm on    room organization consistent    safety round/check completed    supervised activity    patient and family education    lighting adjusted    increase visualization of patient    fall prevention program maintained    increased rounding and observation    clutter free environment maintained    activity supervised  Taken 11/19/2022 0000 by Jana Chu RN  Safety Promotion/Fall Prevention:    bed alarm on    room organization consistent    safety round/check completed    supervised activity    patient and family education    lighting adjusted    increase visualization of patient    fall prevention program maintained    increased rounding and observation    clutter free environment maintained    activity supervised  Taken 11/18/2022 2000 by Jana Chu RN  Safety Promotion/Fall Prevention:    bed alarm on    room organization consistent    safety round/check completed    supervised activity    patient and family education    lighting adjusted    increase visualization of patient    fall prevention program maintained    increased rounding and observation    clutter free environment maintained    activity supervised  Intervention: Prevent Skin Injury  Recent Flowsheet Documentation  Taken 11/19/2022 0400 by Jana Chu  RN  Body Position:    turned    upper extremity elevated    heels elevated  Taken 11/19/2022 0200 by Jaan Chu RN  Body Position:    turned    upper extremity elevated    heels elevated  Taken 11/19/2022 0000 by Jana Chu RN  Body Position:    turned    upper extremity elevated    heels elevated  Taken 11/18/2022 2200 by Jana Chu RN  Body Position:    turned    upper extremity elevated    heels elevated  Taken 11/18/2022 2000 by Jana Chu RN  Body Position:    turned    upper extremity elevated    heels elevated  Intervention: Prevent and Manage VTE (Venous Thromboembolism) Risk  Recent Flowsheet Documentation  Taken 11/19/2022 0400 by Jana Chu RN  VTE Prevention/Management: SCDs (sequential compression devices) on  Taken 11/19/2022 0000 by Jana Chu RN  VTE Prevention/Management: SCDs (sequential compression devices) on  Taken 11/18/2022 2000 by Jana Chu RN  VTE Prevention/Management: SCDs (sequential compression devices) on  Intervention: Prevent Infection  Recent Flowsheet Documentation  Taken 11/19/2022 0400 by Jana Chu RN  Infection Prevention:    equipment surfaces disinfected    hand hygiene promoted    personal protective equipment utilized    rest/sleep promoted    single patient room provided    visitors restricted/screened  Taken 11/19/2022 0000 by Jana Chu RN  Infection Prevention:    equipment surfaces disinfected    hand hygiene promoted    personal protective equipment utilized    rest/sleep promoted    single patient room provided    visitors restricted/screened  Taken 11/18/2022 2000 by Jana Chu RN  Infection Prevention:    equipment surfaces disinfected    hand hygiene promoted    personal protective equipment utilized    rest/sleep promoted    single patient room provided    visitors restricted/screened     Problem: Risk for Delirium  Goal: Optimal Coping  Intervention: Optimize Psychosocial Adjustment to  Delirium  Recent Flowsheet Documentation  Taken 11/18/2022 2000 by Jana Chu RN  Family/Support System Care: self-care encouraged  Goal: Improved Behavioral Control  Intervention: Minimize Safety Risk  Recent Flowsheet Documentation  Taken 11/19/2022 0400 by Jana Chu RN  Enhanced Safety Measures: bed alarm set  Trust Relationship/Rapport:    care explained    reassurance provided  Taken 11/19/2022 0000 by Jana Chu RN  Enhanced Safety Measures: bed alarm set  Trust Relationship/Rapport:    care explained    reassurance provided  Taken 11/18/2022 2000 by Jana Chu RN  Enhanced Safety Measures: bed alarm set  Trust Relationship/Rapport:    care explained    questions answered    questions encouraged    thoughts/feelings acknowledged     Problem: Restraint, Nonviolent  Goal: Absence of Harm or Injury  Intervention: Protect Dignity, Rights and Personal Wellbeing  Recent Flowsheet Documentation  Taken 11/19/2022 0400 by Jana Chu RN  Trust Relationship/Rapport:    care explained    reassurance provided  Taken 11/19/2022 0000 by Jana Chu RN  Trust Relationship/Rapport:    care explained    reassurance provided  Taken 11/18/2022 2000 by Jana Chu RN  Trust Relationship/Rapport:    care explained    questions answered    questions encouraged    thoughts/feelings acknowledged  Intervention: Protect Skin and Joint Integrity  Recent Flowsheet Documentation  Taken 11/19/2022 0400 by Jana Chu RN  Body Position:    turned    upper extremity elevated    heels elevated  Taken 11/19/2022 0200 by Jana Chu RN  Body Position:    turned    upper extremity elevated    heels elevated  Taken 11/19/2022 0000 by Jana Chu RN  Body Position:    turned    upper extremity elevated    heels elevated  Taken 11/18/2022 2200 by Jana Chu RN  Body Position:    turned    upper extremity elevated    heels elevated  Taken 11/18/2022 2000 by Jana Chu  RN  Body Position:    turned    upper extremity elevated    heels elevated     Problem: Pneumonia  Goal: Resolution of Infection Signs and Symptoms  Intervention: Prevent Infection Progression  Recent Flowsheet Documentation  Taken 11/19/2022 0400 by Jana Chu RN  Isolation Precautions:    contact precautions maintained    droplet precautions maintained  Taken 11/19/2022 0000 by Jana Chu RN  Isolation Precautions:    contact precautions maintained    droplet precautions maintained  Taken 11/18/2022 2000 by Jana Chu RN  Isolation Precautions:    contact precautions maintained    droplet precautions maintained   Goal Outcome Evaluation:

## 2022-11-19 NOTE — PROGRESS NOTES
ICU Progress Note    Date of Service: 11/19/22    A/P:  57M cirrhosis, CARRILLO s/p liver txp (9/2016) admitted to ICU 11/12/22 after out of hospital PEA arrest and acute hypoxemic respiratory failure. Course c/b LORETTA requiring CRRT. Found to have Parainfluenza virus and Streptococcal bacteremia.      I have personally reviewed the daily labs, imaging studies, cultures and discussed the case with referring physician and consulting physicians.      Neuro  # Sedation for mechanical ventilation  # Possible post-arrest anoxic brain injury  - holding sedation  - neurocritical care c/s      Pulmonary  # Acute hypoxemic respiratory failure  # ARDS  # Bilateral pleural effusions  # Right pneumothorax - chest tube placement 11/16, R basilar PTX still present, may represent ex vacuo pneumothorax now that pleural fluid has been drained  - vent settings below  - supine  - methylprednisolone 62.5mg wean to daily, hope to get off in the next day or two  - albuterol q4h   - repeat CXR  - continue chest tube -20 cmH2O     Cardiac  # Out of hospital PEA arrest  # Cardiogenic shock  # Septic shock  # Atrial fibrillation w/ RVR  # Therapeutic temperature management - rewarmed 11/14  - MAP > 65  - norepinephrine gtt  - amiodarone bolus and gtt      Renal  # LORETTA - in the setting of PEA arrest and shock state  - monitor UOP, Cr, I/O  - CRRT  - nephrology c/s       GI  # Protein calorie malnutrition   # CARRILLO s/p liver txp - 9/2016  - TF managed by RD   - holding tacrolimus, on admission level 3.6     Heme/Onc  # Leukocytosis - 2/2 sepsis and inflammatory response, profound increase 11/14, afebrile, however on CRRT  # Thrombocytopenia - in the setting of critical illness and now c/f HIT, 4T score 6  - monitor CBC   - f/u HIT labs  - argatroban gtt      ID  # Streptococcal bacteremia  # Parainfluenza virus   # Aspergillus terreus pneumonia - pt at risk for developing invasive Aspergillosis, attempting to wean steroids off as quickly as may be  safely done  - CTX 2g q12h - CNS dosing given that we cannot r/o CNS infection  - monitor Cx data   - discussed with ID, planning to start voriconazole     Endo  # Stress induced hyperglycemia   - monitor BG  - sliding scale insulin      PPX  1. DVT: argatroban    2. VAP: HOB 30 degrees, chlorhexidine rinse  3. Stress Ulcer: PPI  4. Restraints: Nonviolent soft two point restraints required and necessary for patient safety and continued cares and good effect as patient continues to pull at necessary lines, tubes despite education and distraction. Will readdress daily.   5. Wound care - per unit routine   6. Feeding - TF  7. Family updated at bedside.    Interval Hx:  Noted asymmetric pupils ON, NCHCT w/o acute pathology. This AM, Afib w/ RVR, started on amiodarone gtt.      Unable to obtain ROS 2/2 sedation/intubation.     BP (!) 120/98   Pulse (!) 138   Temp 97  F (36.1  C) (Temporal)   Resp 23   Ht 1.829 m (6')   Wt 101.3 kg (223 lb 5.2 oz)   SpO2 99%   BMI 30.29 kg/m    Gen: supine, NAD  Neuro: sedated, pupils equal, not following commands on my exam  HEENT: anicteric  Card: RRR  Pulm: clear b/l, chest tube C/D/I, intermittent air-leak present   Abd: soft, non-distended  MSK: no edema, no acute joint abnormality  Skin: no obvious rash    Vent Mode: CMV/AC  (Continuous Mandatory Ventilation/ Assist Control)  FiO2 (%): 35 %  Resp Rate (Set): 24 breaths/min  Tidal Volume (Set, mL): 400 mL  PEEP (cm H2O): 10 cmH2O  Resp: 23        Intake/Output Summary (Last 24 hours) at 11/19/2022 1110  Last data filed at 11/19/2022 1100  Gross per 24 hour   Intake 2506.1 ml   Output 2642 ml   Net -135.9 ml       Labs: reviewed    Imaging: reviewed    Billing: Patient is critically ill. Total critical care time today, excluding procedures, was 60 minutes.    Ki Watts MD  Pulmonary Disease and Critical Care Medicine   [facility]

## 2022-11-19 NOTE — PROGRESS NOTES
"[facility]    Neurocritical Care Progress Note    Interval EventsStat head CT done overnight for unequal pupils, unchanged from prior.  Off levophed since mid-day yesterday  Switched from heparin drip yesterday to argatroban  More alert    HPI Summary  57M who presented to the ED 11/12 after witnessed PEA arrest. He has a PMH of nonalcoholic fatty liver disease s/o transplant (9/2016) and cirrhosis.    Stroke Evaluation Summarized    MRI/Head CT MRI brain shows multifocal punctate areas of acute to subacute ischemia   Intracranial Vasculature None yet   Cervical Vasculature None yet     Echocardiogram EF 55-60%, mild TR mild AR   EKG/Telemetry Sinus keon, prolonged QT   Other Testing Not Applicable      LDL  No lab value available in past 30 days   A1C  11/19/2022: 4.7 %   Troponin No lab value available in past 48 hrs       Impression/Plan   1. Encephalopathy likely multifactorial but fortunately MRI brain shows no obvious anoxic injury at this time. Had 16 hrs of EEG from 11/13 to 11/14 showing no seizures but generalized burst suppression was seen. Could have lingering effects of previous heavy sedation with his ongoing renal and hepatic impairment  - Continue supportive care. Improving on 11/18 and 11/19    2. Multifocal small ischemic strokes  - Suspect cardioembolic due to cardiac arrest  - If he recovers well, consider doing head/neck vessel imaging and LDL & A1c for secondary prevention, but will not order these now    3. Acute hypoxic respiratory failure  - Per pulmcrit team    Patient Follow-up    - final recommendation pending work-up    We will continue to follow.     Linda Martinez PA-C  Vascular Neurology    To page me or covering stroke neurology team member, click here: [facility]COM   Choose \"On Call\" tab at top, then search dropdown box for \"Neurology Adult\", select location, press Enter, then look for stroke/neuro " ICU/telestroke.  ______________________________________________________    Clinically Significant Risk Factors Present on Admission                  Medications   Scheduled Meds    albuterol  2.5 mg Nebulization Q6H     amiodarone  150 mg Intravenous Once     artificial tears  1 drop Both Eyes TID     cefTRIAXone  2 g Intravenous Q24H     chlorhexidine  15 mL Mouth/Throat Q12H     methylPREDNISolone  62.5 mg Intravenous Q12H     multivitamins w/minerals  15 mL Per Feeding Tube Daily     pantoprazole  40 mg Per Feeding Tube QAM AC    Or     pantoprazole  40 mg Intravenous QAM AC     polyethylene glycol  17 g Oral Daily     senna-docusate  1 tablet Oral BID    Or     senna-docusate  2 tablet Oral BID       Infusion Meds    amiodarone       amiodarone       argatroban 0.5 mcg/kg/min (11/19/22 0505)     dextrose       CRRT replacement solution 0 mL/hr (11/14/22 1030)     epoprostenol Stopped (11/17/22 1032)     insulin regular 5.5 Units/hr (11/19/22 0853)     - MEDICATION INSTRUCTIONS -       - MEDICATION INSTRUCTIONS -       norepinephrine 0.1 mcg/kg/min (11/19/22 1005)     CRRT replacement solution 200 mL/hr at 11/19/22 0510     CRRT replacement solution 0 mL/kg/hr (11/14/22 1030)     propofol Stopped (11/16/22 0910)     sodium chloride 10 mL/hr at 11/18/22 0800     vasopressin Stopped (11/17/22 0222)       PRN Meds  sodium chloride 0.9%, albuterol, calcium gluconate, calcium gluconate, dextrose, glucose **OR** dextrose **OR** glucagon, fentaNYL, fentaNYL, magnesium sulfate, - MEDICATION INSTRUCTIONS -, - MEDICATION INSTRUCTIONS -, metoprolol, naloxone **OR** naloxone **OR** naloxone **OR** naloxone, ondansetron **OR** ondansetron, potassium chloride, prochlorperazine **OR** prochlorperazine **OR** prochlorperazine, sodium phosphate, vecuronium, vecuronium       PHYSICAL EXAMINATION  Temp:  [96.5  F (35.8  C)-97.9  F (36.6  C)] 97  F (36.1  C)  Pulse:  [] 138  Resp:  [14-25] 20  BP: (62-96)/(52-78) 62/52  MAP:   [58 mmHg-130 mmHg] 58 mmHg  Arterial Line BP: ()/() 76/48  FiO2 (%):  [35 %-40 %] 35 %  SpO2:  [95 %-100 %] 97 %      General Exam  General:  unrepsonsive    HEENT:  intubated  Cardio:  RRR  Pulmonary:  on mechanical ventilation      Neuro Exam  Mental Status: turns head to shout slightly, keeps eyes open. Closed eyes on command but followed no peripheral commands. Seems to track examiner slightly  Cranial Nerves:  PERRL, no blink to threat, face grossely symmetric + gag  Motor:  with stimulation makes fist with L hand, minimal withdraw of both hands to noxious, triple flexion to noxious with legs  Reflexes:  deferred  Sensory:  as above to noxious  Coordination:  unable to test  Station/Gait:  deferred      Imaging  I personally reviewed all imaging; relevant findings per HPI.     Lab Results Data   CBC  Recent Labs   Lab 11/19/22 0447 11/18/22 2014 11/18/22  1225   WBC 23.2* 27.5* 26.1*   RBC 2.68* 2.97* 2.87*   HGB 8.8* 9.7* 9.3*   HCT 26.9* 29.3* 28.4*   PLT 39* 40* 41*     Basic Metabolic Panel    Recent Labs   Lab 11/19/22  0851 11/19/22  0746 11/19/22  0559 11/19/22  0554 11/19/22 0452 11/19/22 0447 11/18/22 2017 11/18/22 2014   NA  --   --   --  136  --  138  --  135   POTASSIUM  --   --   --  3.8  --  3.8  --  3.8   CHLORIDE  --   --   --  105  --  106  --  104   CO2  --   --   --  23  --  22  --  23   BUN  --   --   --  32*  --  34*  --  30   CR  --   --   --  0.90  --  1.00  --  0.92   * 160* 192* 203*   < > 208*   < > 229*   KELLY  --   --   --  8.0*  --  7.7*  --  8.5    < > = values in this interval not displayed.     Liver Panel  Recent Labs   Lab 11/19/22 0447 11/18/22 2014 11/18/22 1225 11/18/22  0410 11/17/22  1006 11/17/22  0416   PROTTOTAL 5.2*  --   --  5.9*  --  6.0*   ALBUMIN 1.8* 1.9* 1.9* 1.9*   < > 1.9*   BILITOTAL 1.5*  --   --  2.1*  --  2.1*   ALKPHOS 182*  --   --  168*  --  176*   AST 30  --   --  35  --  75*   ALT 32  --   --  35  --  46    < > = values in  this interval not displayed.     INR    Recent Labs   Lab Test 11/14/22  1020 11/14/22  0201 11/13/22  1701   INR 1.50* 1.47* 1.79*      Lipid Profile    Recent Labs   Lab Test 04/20/20  1157 08/01/19  1500 01/08/19  0840 10/31/17  1037 03/01/16  0648   CHOL 161  --   --  134 199   HDL 42  --   --  57 72   LDL 81  --   --  58 111*   TRIG 192* 177* 135 92 82     A1C    Recent Labs   Lab Test 11/19/22  0212 09/22/16  0741   A1C 4.7 4.8     Troponin    Recent Labs   Lab 11/12/22  1147   TROPONINIS 10          Data   I have personally spent a total of 35 minutes providing care today, time spent in reviewing medical records and reviewing tests, examining the patient and obtaining history, coordination of care, and discussion with the patient and/or family regarding diagnostic results, prognosis, symptom management, risks and benefits of management options, and development of plan of care. Greater than 50% was spent in counseling and coordination of care.

## 2022-11-19 NOTE — PROGRESS NOTES
[facility] ICU RESPIRATORY NOTE        Date of Admission: 11/12/2022    Date of Intubation (most recent): 11/12/2022    Reason for Mechanical Ventilation: Respiratory failure.    Number of Days on Mechanical Ventilation: 8    Met Criteria for Spontaneous Breathing Trial: No    Reason for No Spontaneous Breathing Trial: Per MD.    Significant Events Today: Took patient to CT.     ABG Results:   Recent Labs   Lab 11/18/22  0411 11/17/22 2040 11/17/22  0416 11/16/22  1256   PH 7.39 7.41 7.39 7.35   PCO2 38 34* 37 37   PO2 74* 85 70* 105   HCO3 23 22 22 20*   O2PER 50 50 40 50       Current Vent Settings: Vent Mode: CMV/AC  (Continuous Mandatory Ventilation/ Assist Control)  FiO2 (%): 35 %  Resp Rate (Set): 24 breaths/min  Tidal Volume (Set, mL): 400 mL  PEEP (cm H2O): 10 cmH2O  Resp: 19      Skin Assessment: patient has pressure injury on upper lip, RN is aware mepelix is in place.     Plan: Continue full ventilatory support and wean as tolerated.     Arvin High, RT on 11/19/2022 at 4:48 AM

## 2022-11-19 NOTE — PLAN OF CARE
Neuro: Pt more alert, intermittently following simple commands. Localized movements of all 4 extremities. R>L pupil, but gaze tracking. Very weak.  Fever/Pain: Initially cold, T=96.0F, josseline hugger applied. Now afebrile. C/o pain, given PRN fentanyl x1 with relief.   CV: Initially SR w/ occasional PACs. Approx 0930, became very hypotensive after repositioning, levo started but pt then flipped into A-fib, SX=558-553k sustained. Started on amio gtt w/o improvement, now switching from levo->mayelin.   Pulm: Lung sounds coarse/wheezes/diminished in bases. , Remains on full ventilator support/high-flow/bi-pap @35%. , Scant/Small/Moderate/Copious thick/thin yellow/white/red-streaked respiratory secretions. , and Tolerated PST for 3 hrs at 35% and 8/5.   : Low/Adequate/Good/Excellent urine output.    GI: Tube feed running at goal.  No BM this shift/No BM since admission, bowel meds given.  Glu levels WDL/low/mid/high-@s , on insulin gtt @3 units/hr.    Skin: Pressure injury on coccyx/buttocks, WOC consult in place.   Plan: Frequent neuro assessment. Continue to wean sedation/vent as able. PRN pain management. Family at bedside updated on POC.         Goal Outcome Evaluation:      Plan of Care Reviewed With: patient, family    Overall Patient Progress: improvingOverall Patient Progress: improving

## 2022-11-19 NOTE — PROGRESS NOTES
Note Infectious Disease Consult Service Progress Note  Covering for Pool Lees & [facility]   Pt Name Blanco Osborne   Date 11/19/2022   MRN [id number]     Notes / labs / imaging test results and other data were reviewed    CHIEF COMPLAINT: REASON FOR VISIT    Post cardiac arrest    HPI    56 yo post liver transplant 2016 Nov 12 out of hospital arrest / prolonged resuscitation / gastric intubation for ? Duration  Treated for S pneumo in blood   Noted to have PI 1 in sputum  periph blood WBC up to 85 k      11/19/2022  INTERIM UPDATE    Aspergillus noted in resp secretion culture  Still on vent  Per RN, awake & able to respond to requests    Remainder of 14-point ROS was negative except as listed above    PFSH:  Personal / Family / Social Histories were reviewed and updated  nil new  Vital Signs: BP (!) 62/52   Pulse (!) 138   Temp 97  F (36.1  C) (Temporal)   Resp 20   Ht 1.829 m (6')   Wt 101.3 kg (223 lb 5.2 oz)   SpO2 97%   BMI 30.29 kg/m            EXAM    genl   In ICU  On vent     neuro   Awake  Does not follow commands for me, but did for RN     lungs   coarse     CV   RRR     skin   No nodules / rash seen by me     abd   (+) bowel sounds  Soft     psyche   Not able to assess       Data  Microbiology  Nov 15 sputum   A terreus isolated  Nov 13 resp        Parainflu 1 detected  Nov 12 blood      S pneumo isolated    Imaging      Nov 12 Nov 13 Nov 17      LABS  Lab Results   Component Value Date/Time    WBC 23.2 (H) 11/19/2022 04:47 AM    WBC 27.5 (H) 11/18/2022 08:14 PM    WBC 26.1 (H) 11/18/2022 12:25 PM    WBC 30.2 (H) 11/18/2022 04:10 AM    WBC 4.0 04/20/2020 11:57 AM    WBC 7.7 10/07/2019 01:57 PM    WBC 3.9 (L) 02/20/2019 11:57 AM    WBC 3.8 (L) 07/10/2018 07:20 AM    AST 30 11/19/2022 04:47 AM    AST 35 11/18/2022 04:10 AM    AST 75 (H) 11/17/2022 04:16 AM    AST 74 (H) 11/16/2022 05:46 AM     (H) 04/20/2020 11:57 AM    AST 59 (H) 10/07/2019 01:57 PM    AST 89 (H) 02/20/2019 11:57  AM    AST 40 07/10/2018 07:20 AM    ALT 32 11/19/2022 04:47 AM    ALT 35 11/18/2022 04:10 AM    ALT 46 11/17/2022 04:16 AM    ALT 41 11/16/2022 05:46 AM    ALT 72 (H) 04/20/2020 11:57 AM    ALT 52 10/07/2019 01:57 PM    ALT 74 (H) 02/20/2019 11:57 AM    ALT 37 07/10/2018 07:20 AM    ALKPHOS 182 (H) 11/19/2022 04:47 AM    ALKPHOS 168 (H) 11/18/2022 04:10 AM    ALKPHOS 176 (H) 11/17/2022 04:16 AM    ALKPHOS 188 (H) 11/16/2022 05:46 AM    ALKPHOS 189 (H) 04/20/2020 11:57 AM    ALKPHOS 185 (H) 10/07/2019 01:57 PM    ALKPHOS 118 02/20/2019 11:57 AM    ALKPHOS 110 07/10/2018 07:20 AM           ASSESSMENT & SUGGESTIONS  Patient Active Problem List   Diagnosis Code     Liver transplanted (H) Z94.4     Leukocytosis D72.829     SBP (spontaneous bacterial peritonitis) (H) K65.2     Respiratory arrest (H) R09.2     Acute renal failure  (H) N17.9     Parainfluenza type 1 infection B34.8     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     Acquired immunocompromised state (H) D84.9     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     Med (antibiotic) monitor Z51.81     Sepsis - S pneumoniae (H) A40.3, R65.20, J96.01      S pneumo sepsis - on ceftriaxone / no new indication new complication       If survives, rec PCV20 (Prevnar 20) vaccination    Parainfluenza 1 (PI 1) - supportive care / no specific antiviral therapy    Aspergillus - assume invasive pneumonia or at high risk for it       at high risk true disease / on systemic steroids            Even if no disease now, could develop invasive aspergillosis        QTc today 454 msec       AST / LUCIAN normal today       Potential drug interactions noted         >> rec voriconazole (check for drug interactions w pharmacy)       Hope to change to enteral soon (accumulation of product if iv)       Monitor LFTs, after 1-2 weeks check true trough level to verify absorption    TT 37 min  > 50 % CC         Maurice Osborne MD  Covering for Elissa [facility]  Infectious Disease service    CURRENT MED  REVIEWD  Current Facility-Administered Medications   Medication     0.9% sodium chloride BOLUS     albuterol (PROVENTIL) neb solution 2.5 mg     albuterol (PROVENTIL) neb solution 2.5 mg     amiodarone in NS adult drip std conc 1.8 mg/mL infusion     amiodarone in NS adult drip std conc 1.8 mg/mL infusion     argatroban (ACOVA) 1 mg/mL ANTICOAGULANT infusion     calcium gluconate 2 g in  mL intermittent infusion     calcium gluconate 4 g in sodium chloride 0.9 % 100 mL intermittent infusion     carboxymethylcellulose PF (REFRESH PLUS) 0.5 % ophthalmic solution 1 drop     cefTRIAXone (ROCEPHIN) 2 g vial to attach to  ml bag for ADULTS or NS 50 ml bag for PEDS     chlorhexidine (PERIDEX) 0.12 % solution 15 mL     dextrose 10% infusion     glucose gel 15-30 g    Or     dextrose 50 % injection 25-50 mL    Or     glucagon injection 1 mg     dialysate solution (PrismaSol BGK 2/3.5)     epoprostenol (VELETRI) 20 mcg/mL in sterile water inhalation solution     fentaNYL (PF) (SUBLIMAZE) injection 25-50 mcg     fentaNYL (PF) (SUBLIMAZE) injection 50 mcg     insulin 1 unit/mL in saline (NovoLIN, HumuLIN Regular) drip - ADULT IV Infusion     magnesium sulfate 2 g in water intermittent infusion     medication instruction     Medication Instructions     methylPREDNISolone sodium succinate (solu-MEDROL) injection 62.5 mg     metoprolol (LOPRESSOR) injection 5 mg     multivitamins w/minerals liquid 15 mL     naloxone (NARCAN) injection 0.2 mg    Or     naloxone (NARCAN) injection 0.4 mg    Or     naloxone (NARCAN) injection 0.2 mg    Or     naloxone (NARCAN) injection 0.4 mg     norepinephrine (LEVOPHED) 16 mg in  mL infusion MAX CONC CENTRAL LINE     ondansetron (ZOFRAN ODT) ODT tab 4 mg    Or     ondansetron (ZOFRAN) injection 4 mg     pantoprazole (PROTONIX) 2 mg/mL suspension 40 mg    Or     pantoprazole (PROTONIX) IV push injection 40 mg     polyethylene glycol (MIRALAX) Packet 17 g     Post-Filter  replacement solution (PRISMASOL BGK 2/3.5)     potassium chloride 20 mEq in 50 mL intermittent infusion     Pre-Filter replacement solution (Phoxillum BK4/2.5)     prochlorperazine (COMPAZINE) injection 10 mg    Or     prochlorperazine (COMPAZINE) tablet 10 mg    Or     prochlorperazine (COMPAZINE) suppository 25 mg     propofol (DIPRIVAN) infusion     senna-docusate (SENOKOT-S/PERICOLACE) 8.6-50 MG per tablet 1 tablet    Or     senna-docusate (SENOKOT-S/PERICOLACE) 8.6-50 MG per tablet 2 tablet     sodium chloride 0.9% infusion     sodium phosphate 15 mmol in D5W 250mL  intermittent infusion     vasopressin 0.2 units/mL in NS (PITRESSIN) standard conc infusion     vecuronium (NORCURON) injection 3.88 mg     vecuronium (NORCURON) injection 7.76 mg

## 2022-11-19 NOTE — PROGRESS NOTES
[facility] ICU RESPIRATORY NOTE        Date of Admission: 11/12/2022    Date of Intubation (most recent): 11/12/2022    Reason for Mechanical Ventilation: Respiratory failure    Number of Days on Mechanical Ventilation: 7    Met Criteria for Spontaneous Breathing Trial: No    Reason for No Spontaneous Breathing Trial: Per MD    Significant Events Today: none     ABG Results:   Recent Labs   Lab 11/18/22  0411 11/17/22 2040 11/17/22  0416 11/16/22  1256   PH 7.39 7.41 7.39 7.35   PCO2 38 34* 37 37   PO2 74* 85 70* 105   HCO3 23 22 22 20*   O2PER 50 50 40 50       Current Vent Settings: Vent Mode: CMV/AC  (Continuous Mandatory Ventilation/ Assist Control)  FiO2 (%): 40 %  Resp Rate (Set): 24 breaths/min  Tidal Volume (Set, mL): 400 mL  PEEP (cm H2O): 10 cmH2O  Resp: 24      Skin Assessment: Skin intact    Plan: Continue full vent support and wean as tolerated.     RT Jc on 11/18/2022 at 6:17 PM

## 2022-11-19 NOTE — PROGRESS NOTES
Pt required frequent titration of levo between 0929 and 1005 d/t labile BPs that are not charted on MAR. Dr Ling following.

## 2022-11-19 NOTE — PLAN OF CARE
Neuro: Patient spontaneously opening eyes. Small spontaneous movements in all extremities. Not following. PERRL.     Last NIHSS score: NA    Cardiovascular: Tele: SR with occasional PVC. Ectopy worse this morning and small run of afib--decreased fluid pull from CRRT and seems to have improved ectopy frequency and no more runs of afib. Levophed off and maintaining MAP without. Pulses palpable.    Pulmonary: Lungs intermittently coarse. Chest tube with minimal output. No crepitus noted in chest wall-intermittent air leak in chamber.Scant secretions through ET suction.    : Anuric. On CRRT    GI: No BM. Hypoactive bowel. TF increased at 1500 due to increase at 0300 to goal..      Skin: Wounds on nares/ upper lip area and buttock area. Foam dressings to sites.     Mobility: Bedrest.    Family: Brother Dylan wife Ofe at bedside and supportive/attentive to patient.     Heparin stopped today--HIT panel; pending. Argatroban started this afternoon.

## 2022-11-20 ENCOUNTER — APPOINTMENT (OUTPATIENT)
Dept: GENERAL RADIOLOGY | Facility: CLINIC | Age: 57
DRG: 004 | End: 2022-11-20
Attending: STUDENT IN AN ORGANIZED HEALTH CARE EDUCATION/TRAINING PROGRAM
Payer: COMMERCIAL

## 2022-11-20 PROBLEM — I63.9 EMBOLIC STROKE (H): Status: ACTIVE | Noted: 2022-11-20

## 2022-11-20 LAB
ALBUMIN SERPL-MCNC: 1.8 G/DL (ref 3.4–5)
ALBUMIN SERPL-MCNC: 1.9 G/DL (ref 3.4–5)
ALBUMIN SERPL-MCNC: 2.2 G/DL (ref 3.4–5)
ALP SERPL-CCNC: 217 U/L (ref 40–150)
ALT SERPL W P-5'-P-CCNC: 38 U/L (ref 0–70)
ANION GAP SERPL CALCULATED.3IONS-SCNC: 3 MMOL/L (ref 3–14)
ANION GAP SERPL CALCULATED.3IONS-SCNC: 6 MMOL/L (ref 3–14)
ANION GAP SERPL CALCULATED.3IONS-SCNC: 7 MMOL/L (ref 3–14)
APTT PPP: 86 SECONDS (ref 22–38)
AST SERPL W P-5'-P-CCNC: 33 U/L (ref 0–45)
BASE EXCESS BLDA CALC-SCNC: 0.6 MMOL/L (ref -9–1.8)
BILIRUB DIRECT SERPL-MCNC: 1.1 MG/DL (ref 0–0.2)
BILIRUB SERPL-MCNC: 1.4 MG/DL (ref 0.2–1.3)
BUN SERPL-MCNC: 25 MG/DL (ref 7–30)
BUN SERPL-MCNC: 28 MG/DL (ref 7–30)
BUN SERPL-MCNC: 29 MG/DL (ref 7–30)
CA-I BLD-MCNC: 4.6 MG/DL (ref 4.4–5.2)
CA-I BLD-MCNC: 4.7 MG/DL (ref 4.4–5.2)
CA-I BLD-MCNC: 4.9 MG/DL (ref 4.4–5.2)
CALCIUM SERPL-MCNC: 7.9 MG/DL (ref 8.5–10.1)
CALCIUM SERPL-MCNC: 8 MG/DL (ref 8.5–10.1)
CALCIUM SERPL-MCNC: 8.3 MG/DL (ref 8.5–10.1)
CHLORIDE BLD-SCNC: 105 MMOL/L (ref 94–109)
CHLORIDE BLD-SCNC: 106 MMOL/L (ref 94–109)
CHLORIDE BLD-SCNC: 106 MMOL/L (ref 94–109)
CHOLEST SERPL-MCNC: 121 MG/DL
CO2 SERPL-SCNC: 24 MMOL/L (ref 20–32)
CO2 SERPL-SCNC: 24 MMOL/L (ref 20–32)
CO2 SERPL-SCNC: 26 MMOL/L (ref 20–32)
CREAT SERPL-MCNC: 0.76 MG/DL (ref 0.66–1.25)
CREAT SERPL-MCNC: 0.78 MG/DL (ref 0.66–1.25)
CREAT SERPL-MCNC: 0.84 MG/DL (ref 0.66–1.25)
ERYTHROCYTE [DISTWIDTH] IN BLOOD BY AUTOMATED COUNT: 15.1 % (ref 10–15)
ERYTHROCYTE [DISTWIDTH] IN BLOOD BY AUTOMATED COUNT: 15.2 % (ref 10–15)
ERYTHROCYTE [DISTWIDTH] IN BLOOD BY AUTOMATED COUNT: 16.9 % (ref 10–15)
GFR SERPL CREATININE-BSD FRML MDRD: >90 ML/MIN/1.73M2
GLUCOSE BLD-MCNC: 158 MG/DL (ref 70–99)
GLUCOSE BLD-MCNC: 172 MG/DL (ref 70–99)
GLUCOSE BLD-MCNC: 188 MG/DL (ref 70–99)
GLUCOSE BLDC GLUCOMTR-MCNC: 114 MG/DL (ref 70–99)
GLUCOSE BLDC GLUCOMTR-MCNC: 122 MG/DL (ref 70–99)
GLUCOSE BLDC GLUCOMTR-MCNC: 128 MG/DL (ref 70–99)
GLUCOSE BLDC GLUCOMTR-MCNC: 136 MG/DL (ref 70–99)
GLUCOSE BLDC GLUCOMTR-MCNC: 156 MG/DL (ref 70–99)
GLUCOSE BLDC GLUCOMTR-MCNC: 158 MG/DL (ref 70–99)
GLUCOSE BLDC GLUCOMTR-MCNC: 161 MG/DL (ref 70–99)
GLUCOSE BLDC GLUCOMTR-MCNC: 165 MG/DL (ref 70–99)
GLUCOSE BLDC GLUCOMTR-MCNC: 175 MG/DL (ref 70–99)
GLUCOSE BLDC GLUCOMTR-MCNC: 181 MG/DL (ref 70–99)
GLUCOSE BLDC GLUCOMTR-MCNC: 184 MG/DL (ref 70–99)
HCO3 BLD-SCNC: 25 MMOL/L (ref 21–28)
HCT VFR BLD AUTO: 28.3 % (ref 40–53)
HCT VFR BLD AUTO: 28.5 % (ref 40–53)
HCT VFR BLD AUTO: 33.9 % (ref 40–53)
HDLC SERPL-MCNC: 22 MG/DL
HGB BLD-MCNC: 10.7 G/DL (ref 13.3–17.7)
HGB BLD-MCNC: 9.3 G/DL (ref 13.3–17.7)
HGB BLD-MCNC: 9.3 G/DL (ref 13.3–17.7)
LDLC SERPL CALC-MCNC: 80 MG/DL
MAGNESIUM SERPL-MCNC: 2.1 MG/DL (ref 1.6–2.3)
MAGNESIUM SERPL-MCNC: 2.2 MG/DL (ref 1.6–2.3)
MAGNESIUM SERPL-MCNC: 2.4 MG/DL (ref 1.6–2.3)
MCH RBC QN AUTO: 33 PG (ref 26.5–33)
MCH RBC QN AUTO: 33.3 PG (ref 26.5–33)
MCH RBC QN AUTO: 33.4 PG (ref 26.5–33)
MCHC RBC AUTO-ENTMCNC: 31.6 G/DL (ref 31.5–36.5)
MCHC RBC AUTO-ENTMCNC: 32.6 G/DL (ref 31.5–36.5)
MCHC RBC AUTO-ENTMCNC: 32.9 G/DL (ref 31.5–36.5)
MCV RBC AUTO: 101 FL (ref 78–100)
MCV RBC AUTO: 101 FL (ref 78–100)
MCV RBC AUTO: 106 FL (ref 78–100)
NONHDLC SERPL-MCNC: 99 MG/DL
O2/TOTAL GAS SETTING VFR VENT: 35 %
PCO2 BLD: 36 MM HG (ref 35–45)
PH BLD: 7.45 [PH] (ref 7.35–7.45)
PHOSPHATE SERPL-MCNC: 2 MG/DL (ref 2.5–4.5)
PHOSPHATE SERPL-MCNC: 2 MG/DL (ref 2.5–4.5)
PHOSPHATE SERPL-MCNC: 4.6 MG/DL (ref 2.5–4.5)
PLATELET # BLD AUTO: 117 10E3/UL (ref 150–450)
PLATELET # BLD AUTO: 65 10E3/UL (ref 150–450)
PLATELET # BLD AUTO: 77 10E3/UL (ref 150–450)
PO2 BLD: 71 MM HG (ref 80–105)
POTASSIUM BLD-SCNC: 4 MMOL/L (ref 3.4–5.3)
POTASSIUM BLD-SCNC: 4.2 MMOL/L (ref 3.4–5.3)
POTASSIUM BLD-SCNC: 4.7 MMOL/L (ref 3.4–5.3)
PROT SERPL-MCNC: 5.5 G/DL (ref 6.8–8.8)
RBC # BLD AUTO: 2.79 10E6/UL (ref 4.4–5.9)
RBC # BLD AUTO: 2.82 10E6/UL (ref 4.4–5.9)
RBC # BLD AUTO: 3.2 10E6/UL (ref 4.4–5.9)
SODIUM SERPL-SCNC: 134 MMOL/L (ref 133–144)
SODIUM SERPL-SCNC: 136 MMOL/L (ref 133–144)
SODIUM SERPL-SCNC: 137 MMOL/L (ref 133–144)
TRIGL SERPL-MCNC: 94 MG/DL
UFH PPP CHRO-ACNC: <0.1 IU/ML
WBC # BLD AUTO: 28.9 10E3/UL (ref 4–11)
WBC # BLD AUTO: 29.8 10E3/UL (ref 4–11)
WBC # BLD AUTO: 44.5 10E3/UL (ref 4–11)

## 2022-11-20 PROCEDURE — 85027 COMPLETE CBC AUTOMATED: CPT | Performed by: STUDENT IN AN ORGANIZED HEALTH CARE EDUCATION/TRAINING PROGRAM

## 2022-11-20 PROCEDURE — 83735 ASSAY OF MAGNESIUM: CPT | Performed by: STUDENT IN AN ORGANIZED HEALTH CARE EDUCATION/TRAINING PROGRAM

## 2022-11-20 PROCEDURE — 999N000157 HC STATISTIC RCP TIME EA 10 MIN

## 2022-11-20 PROCEDURE — 258N000003 HC RX IP 258 OP 636: Performed by: ANESTHESIOLOGY

## 2022-11-20 PROCEDURE — 258N000003 HC RX IP 258 OP 636: Performed by: STUDENT IN AN ORGANIZED HEALTH CARE EDUCATION/TRAINING PROGRAM

## 2022-11-20 PROCEDURE — 71045 X-RAY EXAM CHEST 1 VIEW: CPT

## 2022-11-20 PROCEDURE — 999N000253 HC STATISTIC WEANING TRIALS

## 2022-11-20 PROCEDURE — 82803 BLOOD GASES ANY COMBINATION: CPT | Performed by: STUDENT IN AN ORGANIZED HEALTH CARE EDUCATION/TRAINING PROGRAM

## 2022-11-20 PROCEDURE — 80061 LIPID PANEL: CPT | Performed by: PHYSICIAN ASSISTANT

## 2022-11-20 PROCEDURE — 999N000259 HC STATISTIC EXTUBATION

## 2022-11-20 PROCEDURE — 250N000011 HC RX IP 250 OP 636: Performed by: SPECIALIST

## 2022-11-20 PROCEDURE — 99233 SBSQ HOSP IP/OBS HIGH 50: CPT | Performed by: STUDENT IN AN ORGANIZED HEALTH CARE EDUCATION/TRAINING PROGRAM

## 2022-11-20 PROCEDURE — 84100 ASSAY OF PHOSPHORUS: CPT | Performed by: STUDENT IN AN ORGANIZED HEALTH CARE EDUCATION/TRAINING PROGRAM

## 2022-11-20 PROCEDURE — 250N000011 HC RX IP 250 OP 636: Performed by: STUDENT IN AN ORGANIZED HEALTH CARE EDUCATION/TRAINING PROGRAM

## 2022-11-20 PROCEDURE — 94640 AIRWAY INHALATION TREATMENT: CPT | Mod: 76

## 2022-11-20 PROCEDURE — 250N000013 HC RX MED GY IP 250 OP 250 PS 637: Performed by: STUDENT IN AN ORGANIZED HEALTH CARE EDUCATION/TRAINING PROGRAM

## 2022-11-20 PROCEDURE — 99291 CRITICAL CARE FIRST HOUR: CPT | Mod: FS | Performed by: PSYCHIATRY & NEUROLOGY

## 2022-11-20 PROCEDURE — 250N000012 HC RX MED GY IP 250 OP 636 PS 637: Performed by: ANESTHESIOLOGY

## 2022-11-20 PROCEDURE — 82330 ASSAY OF CALCIUM: CPT | Performed by: STUDENT IN AN ORGANIZED HEALTH CARE EDUCATION/TRAINING PROGRAM

## 2022-11-20 PROCEDURE — 250N000009 HC RX 250: Performed by: STUDENT IN AN ORGANIZED HEALTH CARE EDUCATION/TRAINING PROGRAM

## 2022-11-20 PROCEDURE — 99291 CRITICAL CARE FIRST HOUR: CPT | Performed by: STUDENT IN AN ORGANIZED HEALTH CARE EDUCATION/TRAINING PROGRAM

## 2022-11-20 PROCEDURE — 250N000009 HC RX 250: Performed by: INTERNAL MEDICINE

## 2022-11-20 PROCEDURE — 85730 THROMBOPLASTIN TIME PARTIAL: CPT | Performed by: STUDENT IN AN ORGANIZED HEALTH CARE EDUCATION/TRAINING PROGRAM

## 2022-11-20 PROCEDURE — C9113 INJ PANTOPRAZOLE SODIUM, VIA: HCPCS | Performed by: STUDENT IN AN ORGANIZED HEALTH CARE EDUCATION/TRAINING PROGRAM

## 2022-11-20 PROCEDURE — 85520 HEPARIN ASSAY: CPT | Performed by: STUDENT IN AN ORGANIZED HEALTH CARE EDUCATION/TRAINING PROGRAM

## 2022-11-20 PROCEDURE — 999N000009 HC STATISTIC AIRWAY CARE

## 2022-11-20 PROCEDURE — 94640 AIRWAY INHALATION TREATMENT: CPT

## 2022-11-20 PROCEDURE — 200N000001 HC R&B ICU

## 2022-11-20 PROCEDURE — 82248 BILIRUBIN DIRECT: CPT | Performed by: INTERNAL MEDICINE

## 2022-11-20 PROCEDURE — 94003 VENT MGMT INPAT SUBQ DAY: CPT

## 2022-11-20 PROCEDURE — 82247 BILIRUBIN TOTAL: CPT | Performed by: INTERNAL MEDICINE

## 2022-11-20 RX ORDER — HEPARIN SODIUM 10000 [USP'U]/100ML
500 INJECTION, SOLUTION INTRAVENOUS CONTINUOUS
Status: DISCONTINUED | OUTPATIENT
Start: 2022-11-20 | End: 2022-11-23 | Stop reason: CLARIF

## 2022-11-20 RX ADMIN — Medication 15 ML: at 08:49

## 2022-11-20 RX ADMIN — CHLORHEXIDINE GLUCONATE 0.12% ORAL RINSE 15 ML: 1.2 LIQUID ORAL at 07:55

## 2022-11-20 RX ADMIN — SODIUM CHLORIDE 5.5 UNITS/HR: 9 INJECTION, SOLUTION INTRAVENOUS at 10:16

## 2022-11-20 RX ADMIN — CALCIUM CHLORIDE, MAGNESIUM CHLORIDE, SODIUM CHLORIDE, SODIUM BICARBONATE, POTASSIUM CHLORIDE AND SODIUM PHOSPHATE DIBASIC DIHYDRATE 5000 ML: 3.68; 3.05; 6.34; 3.09; .314; .187 INJECTION INTRAVENOUS at 02:12

## 2022-11-20 RX ADMIN — Medication 1 DROP: at 21:53

## 2022-11-20 RX ADMIN — HEPARIN SODIUM 500 UNITS/HR: 10000 INJECTION, SOLUTION INTRAVENOUS at 10:47

## 2022-11-20 RX ADMIN — CALCIUM CHLORIDE, MAGNESIUM CHLORIDE, DEXTROSE MONOHYDRATE, LACTIC ACID, SODIUM CHLORIDE, SODIUM BICARBONATE AND POTASSIUM CHLORIDE 5000 ML: 5.15; 2.03; 22; 5.4; 6.46; 3.09; .157 INJECTION INTRAVENOUS at 04:45

## 2022-11-20 RX ADMIN — CALCIUM CHLORIDE, MAGNESIUM CHLORIDE, DEXTROSE MONOHYDRATE, LACTIC ACID, SODIUM CHLORIDE, SODIUM BICARBONATE AND POTASSIUM CHLORIDE 5000 ML: 5.15; 2.03; 22; 5.4; 6.46; 3.09; .157 INJECTION INTRAVENOUS at 15:44

## 2022-11-20 RX ADMIN — CALCIUM CHLORIDE, MAGNESIUM CHLORIDE, SODIUM CHLORIDE, SODIUM BICARBONATE, POTASSIUM CHLORIDE AND SODIUM PHOSPHATE DIBASIC DIHYDRATE 5000 ML: 3.68; 3.05; 6.34; 3.09; .314; .187 INJECTION INTRAVENOUS at 19:57

## 2022-11-20 RX ADMIN — METHYLPREDNISOLONE SODIUM SUCCINATE 62.5 MG: 125 INJECTION, POWDER, FOR SOLUTION INTRAMUSCULAR; INTRAVENOUS at 10:38

## 2022-11-20 RX ADMIN — CALCIUM CHLORIDE, MAGNESIUM CHLORIDE, DEXTROSE MONOHYDRATE, LACTIC ACID, SODIUM CHLORIDE, SODIUM BICARBONATE AND POTASSIUM CHLORIDE 5000 ML: 5.15; 2.03; 22; 5.4; 6.46; 3.09; .157 INJECTION INTRAVENOUS at 09:49

## 2022-11-20 RX ADMIN — SODIUM CHLORIDE 600 MG: 9 INJECTION, SOLUTION INTRAVENOUS at 00:22

## 2022-11-20 RX ADMIN — ALBUTEROL SULFATE 2.5 MG: 2.5 SOLUTION RESPIRATORY (INHALATION) at 13:41

## 2022-11-20 RX ADMIN — CALCIUM CHLORIDE, MAGNESIUM CHLORIDE, SODIUM CHLORIDE, SODIUM BICARBONATE, POTASSIUM CHLORIDE AND SODIUM PHOSPHATE DIBASIC DIHYDRATE 5000 ML: 3.68; 3.05; 6.34; 3.09; .314; .187 INJECTION INTRAVENOUS at 17:21

## 2022-11-20 RX ADMIN — ALBUTEROL SULFATE 2.5 MG: 2.5 SOLUTION RESPIRATORY (INHALATION) at 19:00

## 2022-11-20 RX ADMIN — CALCIUM CHLORIDE, MAGNESIUM CHLORIDE, SODIUM CHLORIDE, SODIUM BICARBONATE, POTASSIUM CHLORIDE AND SODIUM PHOSPHATE DIBASIC DIHYDRATE 5000 ML: 3.68; 3.05; 6.34; 3.09; .314; .187 INJECTION INTRAVENOUS at 06:42

## 2022-11-20 RX ADMIN — ALBUTEROL SULFATE 2.5 MG: 2.5 SOLUTION RESPIRATORY (INHALATION) at 01:28

## 2022-11-20 RX ADMIN — CHLORHEXIDINE GLUCONATE 0.12% ORAL RINSE 15 ML: 1.2 LIQUID ORAL at 19:55

## 2022-11-20 RX ADMIN — SODIUM CHLORIDE 3 UNITS/HR: 9 INJECTION, SOLUTION INTRAVENOUS at 19:56

## 2022-11-20 RX ADMIN — AMIODARONE HYDROCHLORIDE 0.5 MG/MIN: 50 INJECTION, SOLUTION INTRAVENOUS at 08:15

## 2022-11-20 RX ADMIN — CALCIUM CHLORIDE, MAGNESIUM CHLORIDE, SODIUM CHLORIDE, SODIUM BICARBONATE, POTASSIUM CHLORIDE AND SODIUM PHOSPHATE DIBASIC DIHYDRATE 5000 ML: 3.68; 3.05; 6.34; 3.09; .314; .187 INJECTION INTRAVENOUS at 12:43

## 2022-11-20 RX ADMIN — FENTANYL CITRATE 50 MCG: 50 INJECTION, SOLUTION INTRAMUSCULAR; INTRAVENOUS at 03:20

## 2022-11-20 RX ADMIN — Medication 1 DROP: at 16:03

## 2022-11-20 RX ADMIN — CALCIUM CHLORIDE, MAGNESIUM CHLORIDE, DEXTROSE MONOHYDRATE, LACTIC ACID, SODIUM CHLORIDE, SODIUM BICARBONATE AND POTASSIUM CHLORIDE 5000 ML: 5.15; 2.03; 22; 5.4; 6.46; 3.09; .157 INJECTION INTRAVENOUS at 07:37

## 2022-11-20 RX ADMIN — FENTANYL CITRATE 50 MCG: 50 INJECTION INTRAMUSCULAR; INTRAVENOUS at 22:28

## 2022-11-20 RX ADMIN — SODIUM CHLORIDE 400 MG: 9 INJECTION, SOLUTION INTRAVENOUS at 22:27

## 2022-11-20 RX ADMIN — CALCIUM CHLORIDE, MAGNESIUM CHLORIDE, DEXTROSE MONOHYDRATE, LACTIC ACID, SODIUM CHLORIDE, SODIUM BICARBONATE AND POTASSIUM CHLORIDE: 5.15; 2.03; 22; 5.4; 6.46; 3.09; .157 INJECTION INTRAVENOUS at 22:17

## 2022-11-20 RX ADMIN — Medication 1 DROP: at 08:49

## 2022-11-20 RX ADMIN — OXYCODONE HYDROCHLORIDE 5 MG: 5 TABLET ORAL at 02:12

## 2022-11-20 RX ADMIN — CALCIUM CHLORIDE, MAGNESIUM CHLORIDE, SODIUM CHLORIDE, SODIUM BICARBONATE, POTASSIUM CHLORIDE AND SODIUM PHOSPHATE DIBASIC DIHYDRATE 5000 ML: 3.68; 3.05; 6.34; 3.09; .314; .187 INJECTION INTRAVENOUS at 15:00

## 2022-11-20 RX ADMIN — CALCIUM CHLORIDE, MAGNESIUM CHLORIDE, SODIUM CHLORIDE, SODIUM BICARBONATE, POTASSIUM CHLORIDE AND SODIUM PHOSPHATE DIBASIC DIHYDRATE 5000 ML: 3.68; 3.05; 6.34; 3.09; .314; .187 INJECTION INTRAVENOUS at 22:17

## 2022-11-20 RX ADMIN — CALCIUM CHLORIDE, MAGNESIUM CHLORIDE, DEXTROSE MONOHYDRATE, LACTIC ACID, SODIUM CHLORIDE, SODIUM BICARBONATE AND POTASSIUM CHLORIDE: 5.15; 2.03; 22; 5.4; 6.46; 3.09; .157 INJECTION INTRAVENOUS at 06:35

## 2022-11-20 RX ADMIN — VORICONAZOLE 300 MG: 40 POWDER, FOR SUSPENSION ORAL at 11:58

## 2022-11-20 RX ADMIN — CALCIUM CHLORIDE, MAGNESIUM CHLORIDE, SODIUM CHLORIDE, SODIUM BICARBONATE, POTASSIUM CHLORIDE AND SODIUM PHOSPHATE DIBASIC DIHYDRATE 5000 ML: 3.68; 3.05; 6.34; 3.09; .314; .187 INJECTION INTRAVENOUS at 00:15

## 2022-11-20 RX ADMIN — CEFTRIAXONE SODIUM 2 G: 2 INJECTION, POWDER, FOR SOLUTION INTRAMUSCULAR; INTRAVENOUS at 04:42

## 2022-11-20 RX ADMIN — SODIUM CHLORIDE 5.5 UNITS/HR: 9 INJECTION, SOLUTION INTRAVENOUS at 03:22

## 2022-11-20 RX ADMIN — CALCIUM CHLORIDE, MAGNESIUM CHLORIDE, SODIUM CHLORIDE, SODIUM BICARBONATE, POTASSIUM CHLORIDE AND SODIUM PHOSPHATE DIBASIC DIHYDRATE 5000 ML: 3.68; 3.05; 6.34; 3.09; .314; .187 INJECTION INTRAVENOUS at 09:47

## 2022-11-20 RX ADMIN — FENTANYL CITRATE 50 MCG: 50 INJECTION, SOLUTION INTRAMUSCULAR; INTRAVENOUS at 04:42

## 2022-11-20 RX ADMIN — CALCIUM CHLORIDE, MAGNESIUM CHLORIDE, DEXTROSE MONOHYDRATE, LACTIC ACID, SODIUM CHLORIDE, SODIUM BICARBONATE AND POTASSIUM CHLORIDE: 5.15; 2.03; 22; 5.4; 6.46; 3.09; .157 INJECTION INTRAVENOUS at 09:51

## 2022-11-20 RX ADMIN — PANTOPRAZOLE SODIUM 40 MG: 40 INJECTION, POWDER, FOR SOLUTION INTRAVENOUS at 06:43

## 2022-11-20 RX ADMIN — ALBUTEROL SULFATE 2.5 MG: 2.5 SOLUTION RESPIRATORY (INHALATION) at 07:34

## 2022-11-20 RX ADMIN — FENTANYL CITRATE 50 MCG: 50 INJECTION INTRAMUSCULAR; INTRAVENOUS at 19:55

## 2022-11-20 ASSESSMENT — ACTIVITIES OF DAILY LIVING (ADL)
ADLS_ACUITY_SCORE: 51

## 2022-11-20 NOTE — PLAN OF CARE
"35% PEEP 5 R24  argatroban, insulin, amiodarone, (holding neosynephrine at time of note)  \"Promote\" TF at goal 80 mL/h    - Patient in A-fib at start of shift, reduced fluid removal rate to max. ordered net rate, hypotension resolved, titrated off neosynephrine, converted to NSR  - Tolerated CRRT for shift  - Using mayelin at lowest dose for BP drops r/t fentanyl prn for sleep  - One time output 240 mL to chest tube with multiple turns for linen change, no respiratory changes at that time  - 1 moderate tarry stool  - Patient follows commands, appears frustrated, sleeps only with PRN fentanyl, denies pain            Problem: Risk for Delirium  Goal: Improved Sleep  Outcome: Not Progressing     Problem: Risk for Delirium  Goal: Improved Attention and Thought Clarity  Outcome: Progressing     Problem: Pneumonia  Goal: Fluid Balance  Outcome: Progressing  Goal: Resolution of Infection Signs and Symptoms  Outcome: Progressing  Intervention: Prevent Infection Progression  Recent Flowsheet Documentation  Taken 11/20/2022 0400 by Jana Chu RN  Isolation Precautions:   contact precautions maintained   droplet precautions maintained  Taken 11/20/2022 0000 by Jana Chu RN  Isolation Precautions:   contact precautions maintained   droplet precautions maintained  Taken 11/19/2022 2000 by Jana Chu RN  Isolation Precautions:   contact precautions maintained   droplet precautions maintained  Goal: Effective Oxygenation and Ventilation  Outcome: Progressing  Intervention: Promote Airway Secretion Clearance  Recent Flowsheet Documentation  Taken 11/20/2022 0400 by Jana Chu RN  Cough And Deep Breathing: unable to perform  Taken 11/20/2022 0000 by Jana Chu RN  Cough And Deep Breathing: unable to perform  Taken 11/19/2022 2000 by Jana Chu RN  Cough And Deep Breathing: unable to perform  Intervention: Optimize Oxygenation and Ventilation  Recent Flowsheet Documentation  Taken 11/20/2022 " 0400 by Jana Chu RN  Head of Bed (HOB) Positioning: HOB at 30 degrees  Taken 11/20/2022 0200 by Jana Chu RN  Head of Bed (HOB) Positioning: HOB at 30 degrees  Taken 11/20/2022 0000 by Jana Chu RN  Head of Bed (HOB) Positioning: HOB at 30 degrees  Taken 11/19/2022 2200 by Jana Chu RN  Head of Bed (HOB) Positioning: HOB at 30 degrees  Taken 11/19/2022 2000 by Jana Chu RN  Head of Bed (HOB) Positioning: HOB at 30 degrees     Problem: CRRT (Continuous Renal Replacement Therapy)  Goal: Hemodynamic Stability  Outcome: Progressing  Goal: Body Temperature Maintained in Desired Range  Outcome: Progressing  Goal: Absence of Infection Signs and Symptoms  Outcome: Progressing  Intervention: Prevent or Manage Infection  Recent Flowsheet Documentation  Taken 11/20/2022 0400 by Jana Chu RN  Infection Prevention:   equipment surfaces disinfected   hand hygiene promoted   personal protective equipment utilized   rest/sleep promoted   single patient room provided   visitors restricted/screened  Isolation Precautions:   contact precautions maintained   droplet precautions maintained  Taken 11/20/2022 0000 by Jana Chu RN  Infection Prevention:   equipment surfaces disinfected   hand hygiene promoted   personal protective equipment utilized   rest/sleep promoted   single patient room provided   visitors restricted/screened  Isolation Precautions:   contact precautions maintained   droplet precautions maintained  Taken 11/19/2022 2000 by Jana Chu RN  Infection Prevention:   equipment surfaces disinfected   hand hygiene promoted   personal protective equipment utilized   rest/sleep promoted   single patient room provided   visitors restricted/screened  Isolation Precautions:   contact precautions maintained   droplet precautions maintained     Problem: Plan of Care - These are the overarching goals to be used throughout the patient stay.    Goal: Absence of  Hospital-Acquired Illness or Injury  Intervention: Identify and Manage Fall Risk  Recent Flowsheet Documentation  Taken 11/20/2022 0400 by Jana Chu RN  Safety Promotion/Fall Prevention:   bed alarm on   room organization consistent   safety round/check completed   supervised activity   patient and family education   lighting adjusted   increase visualization of patient   fall prevention program maintained   increased rounding and observation   clutter free environment maintained   activity supervised  Taken 11/20/2022 0000 by Jana Chu RN  Safety Promotion/Fall Prevention:   bed alarm on   room organization consistent   safety round/check completed   supervised activity   patient and family education   lighting adjusted   increase visualization of patient   fall prevention program maintained   increased rounding and observation   clutter free environment maintained   activity supervised  Taken 11/19/2022 2000 by Jana Chu RN  Safety Promotion/Fall Prevention:   bed alarm on   room organization consistent   safety round/check completed   supervised activity   patient and family education   lighting adjusted   increase visualization of patient   fall prevention program maintained   increased rounding and observation   clutter free environment maintained   activity supervised  Intervention: Prevent Skin Injury  Recent Flowsheet Documentation  Taken 11/20/2022 0400 by Jana Chu RN  Body Position:   turned   upper extremity elevated   heels elevated  Taken 11/20/2022 0200 by Jana Chu RN  Body Position:   turned   upper extremity elevated   heels elevated  Taken 11/20/2022 0000 by Jana Chu RN  Body Position:   turned   upper extremity elevated   heels elevated  Taken 11/19/2022 2200 by Jana Chu RN  Body Position:   turned   upper extremity elevated   heels elevated  Taken 11/19/2022 2000 by Jana Chu RN  Body Position:   turned   upper extremity  elevated   heels elevated  Intervention: Prevent and Manage VTE (Venous Thromboembolism) Risk  Recent Flowsheet Documentation  Taken 11/20/2022 0400 by Jana Chu RN  VTE Prevention/Management: SCDs (sequential compression devices) on  Taken 11/20/2022 0000 by Jana Chu RN  VTE Prevention/Management: SCDs (sequential compression devices) on  Taken 11/19/2022 2000 by Jana Chu RN  VTE Prevention/Management: SCDs (sequential compression devices) on  Intervention: Prevent Infection  Recent Flowsheet Documentation  Taken 11/20/2022 0400 by Jana Chu RN  Infection Prevention:   equipment surfaces disinfected   hand hygiene promoted   personal protective equipment utilized   rest/sleep promoted   single patient room provided   visitors restricted/screened  Taken 11/20/2022 0000 by Jana Chu RN  Infection Prevention:   equipment surfaces disinfected   hand hygiene promoted   personal protective equipment utilized   rest/sleep promoted   single patient room provided   visitors restricted/screened  Taken 11/19/2022 2000 by Jana Chu RN  Infection Prevention:   equipment surfaces disinfected   hand hygiene promoted   personal protective equipment utilized   rest/sleep promoted   single patient room provided   visitors restricted/screened  Goal: Optimal Comfort and Wellbeing  Intervention: Provide Person-Centered Care  Recent Flowsheet Documentation  Taken 11/20/2022 0400 by Jana Chu RN  Trust Relationship/Rapport:   care explained   reassurance provided   choices provided  Taken 11/20/2022 0000 by Jana Chu RN  Trust Relationship/Rapport:   care explained   reassurance provided   choices provided  Taken 11/19/2022 2000 by Jana Chu RN  Trust Relationship/Rapport:   care explained   reassurance provided   choices provided     Problem: Risk for Delirium  Goal: Improved Behavioral Control  Intervention: Minimize Safety Risk  Recent Flowsheet  Documentation  Taken 11/20/2022 0400 by Jana Chu RN  Enhanced Safety Measures: bed alarm set  Trust Relationship/Rapport:   care explained   reassurance provided   choices provided  Taken 11/20/2022 0000 by Jana Chu RN  Enhanced Safety Measures: bed alarm set  Trust Relationship/Rapport:   care explained   reassurance provided   choices provided  Taken 11/19/2022 2000 by Jana Chu RN  Enhanced Safety Measures: bed alarm set  Trust Relationship/Rapport:   care explained   reassurance provided   choices provided     Problem: Restraint, Nonviolent  Goal: Absence of Harm or Injury  Intervention: Protect Dignity, Rights and Personal Wellbeing  Recent Flowsheet Documentation  Taken 11/20/2022 0400 by Jana Chu RN  Trust Relationship/Rapport:   care explained   reassurance provided   choices provided  Taken 11/20/2022 0000 by Jana Chu RN  Trust Relationship/Rapport:   care explained   reassurance provided   choices provided  Taken 11/19/2022 2000 by Jana Chu RN  Trust Relationship/Rapport:   care explained   reassurance provided   choices provided  Intervention: Protect Skin and Joint Integrity  Recent Flowsheet Documentation  Taken 11/20/2022 0400 by Jana Chu RN  Body Position:   turned   upper extremity elevated   heels elevated  Taken 11/20/2022 0200 by Jana Chu RN  Body Position:   turned   upper extremity elevated   heels elevated  Taken 11/20/2022 0000 by Jana Chu RN  Body Position:   turned   upper extremity elevated   heels elevated  Taken 11/19/2022 2200 by Jana Chu RN  Body Position:   turned   upper extremity elevated   heels elevated  Taken 11/19/2022 2000 by Jana Chu RN  Body Position:   turned   upper extremity elevated   heels elevated    Goal Outcome Evaluation:

## 2022-11-20 NOTE — PROGRESS NOTES
"      [facility]    Neurocritical Care Progress Note    Interval EventsAfebrile. Family at bedside, nodding yes/no  No events overnight. Still on CRRT. Was in afib. On and off levophed.    HPI Summary  57M who presented to the ED 11/12 after witnessed PEA arrest. He has a PMH of nonalcoholic fatty liver disease s/o transplant (9/2016) and cirrhosis.    Stroke Evaluation Summarized    MRI/Head CT MRI brain shows multifocal punctate areas of acute to subacute ischemia   Intracranial Vasculature None yet   Cervical Vasculature None yet     Echocardiogram EF 55-60%, mild TR mild AR   EKG/Telemetry Sinus keon, prolonged QT   Other Testing Not Applicable      LDL  No lab value available in past 30 days   A1C  11/19/2022: 4.7 %   Troponin No lab value available in past 48 hrs       Impression/Plan   1. Encephalopathy likely multifactorial but fortunately MRI brain shows no obvious anoxic injury at this time. Had 16 hrs of EEG from 11/13 to 11/14 showing no seizures but generalized burst suppression was seen. Could have lingering effects of previous heavy sedation with his ongoing renal and hepatic impairment  - Continue supportive care. Improving daily    2. Multifocal small ischemic strokes  - Suspect cardioembolic due to cardiac arrest, although he's also been in afib this admission which could be source  - If he recovers well, consider doing head/neck vessel imaging for secondary stroke prevention, but will not order these now especially because he's still on CRRT so cannot have CTA    3. Acute hypoxic respiratory failure  - Per pulmcrit team    Patient Follow-up    - in 8-12 weeks with general neurology ([phone]) (ordered)    No further stroke evaluation is recommended, so we will sign off. Please contact us with any additional questions.    Linda Martinez PA-C  Vascular Neurology    To page me or covering stroke neurology team member, click here: AMCOM   Choose \"On Call\" tab at top, " "then search dropdown box for \"Neurology Adult\", select location, press Enter, then look for stroke/neuro ICU/telestroke.  ______________________________________________________    Clinically Significant Risk Factors Present on Admission                  Medications   Scheduled Meds    albuterol  2.5 mg Nebulization Q6H     artificial tears  1 drop Both Eyes TID     cefTRIAXone  2 g Intravenous Q24H     chlorhexidine  15 mL Mouth/Throat Q12H     methylPREDNISolone  62.5 mg Intravenous Q24H     multivitamins w/minerals  15 mL Per Feeding Tube Daily     pantoprazole  40 mg Per Feeding Tube QAM AC    Or     pantoprazole  40 mg Intravenous QAM AC     polyethylene glycol  17 g Oral Daily     senna-docusate  1 tablet Oral BID    Or     senna-docusate  2 tablet Oral BID     voriconazole  300 mg Oral Q12H BIJU (08/20)       Infusion Meds    amiodarone 0.5 mg/min (11/20/22 0815)     argatroban 0.5 mcg/kg/min (11/20/22 0757)     dextrose       CRRT replacement solution 5,000 mL (11/19/22 1550)     insulin regular 5.5 Units/hr (11/20/22 0747)     - MEDICATION INSTRUCTIONS -       - MEDICATION INSTRUCTIONS -       norepinephrine Stopped (11/19/22 1826)     phenylephrine Stopped (11/20/22 0504)     CRRT replacement solution 200 mL/hr at 11/20/22 0635     CRRT replacement solution 0 mL/kg/hr (11/14/22 1030)     propofol Stopped (11/16/22 0910)     sodium chloride 10 mL/hr at 11/19/22 1930     sodium chloride Stopped (11/19/22 1930)       PRN Meds  sodium chloride 0.9%, albuterol, calcium gluconate, calcium gluconate, dextrose, glucose **OR** dextrose **OR** glucagon, fentaNYL, fentaNYL, magnesium sulfate, - MEDICATION INSTRUCTIONS -, - MEDICATION INSTRUCTIONS -, metoprolol, naloxone **OR** naloxone **OR** naloxone **OR** naloxone, ondansetron **OR** ondansetron, oxyCODONE, potassium chloride, prochlorperazine **OR** prochlorperazine **OR** prochlorperazine, sodium phosphate, vecuronium, vecuronium       PHYSICAL EXAMINATION  Temp:  " [97  F (36.1  C)-98.6  F (37  C)] 97.4  F (36.3  C)  Pulse:  [] 71  Resp:  [8-33] 24  BP: ()/() 88/78  MAP:  [56 mmHg-164 mmHg] 70 mmHg  Arterial Line BP: ()/() 103/53  FiO2 (%):  [35 %] 35 %  SpO2:  [91 %-100 %] 98 %      General Exam  General:  patient lying in bed without any acute distress    HEENT:  intubated  Cardio:  RRR  Pulmonary:  on mechanical ventilation      Neuro Exam  Mental Status: keeps eyes open. Follows many central and peripheral commands and nods head appropriately  Cranial Nerves:  PERRL, face grossely symmetric + gag  Motor:  Not antigravity in any extremity but does follow commands in all 4 extremities though weakly  Reflexes:  deferred  Sensory:  noxious deferred  Coordination:  unable to test  Station/Gait:  deferred      Imaging  I personally reviewed all imaging; relevant findings per HPI.     Lab Results Data   CBC  Recent Labs   Lab 11/20/22 0326 11/19/22 2007 11/19/22  1238   WBC 29.8* 51.8* 53.1*   RBC 2.79* 2.98* 2.99*   HGB 9.3* 9.9* 9.8*   HCT 28.3* 29.9* 29.6*   PLT 65* 98* 95*     Basic Metabolic Panel    Recent Labs   Lab 11/20/22  0745 11/20/22  0510 11/20/22  0331 11/20/22  0326 11/19/22 2011 11/19/22 2007 11/19/22  1241 11/19/22  1238   NA  --   --   --  137  --  135  --  136   POTASSIUM  --   --   --  4.0  --  4.3  --  3.8   CHLORIDE  --   --   --  106  --  106  --  105   CO2  --   --   --  24  --  23  --  24   BUN  --   --   --  28  --  30  --  31*   CR  --   --   --  0.76  --  0.87  --  0.84   * 161* 156* 172*   < > 164*   < > 168*   KELLY  --   --   --  8.0*  --  8.1*  --  8.3*    < > = values in this interval not displayed.     Liver Panel  Recent Labs   Lab 11/20/22  0326 11/19/22 2007 11/19/22  1238 11/19/22  0447 11/18/22  1225 11/18/22  0410   PROTTOTAL 5.5*  --   --  5.2*  --  5.9*   ALBUMIN 1.8* 1.9* 2.0* 1.8*   < > 1.9*   BILITOTAL 1.4*  --   --  1.5*  --  2.1*   ALKPHOS 217*  --   --  182*  --  168*   AST 33  --   --  30   --  35   ALT 38  --   --  32  --  35    < > = values in this interval not displayed.     INR    Recent Labs   Lab Test 11/14/22  1020 11/14/22  0201 11/13/22  1701   INR 1.50* 1.47* 1.79*      Lipid Profile    Recent Labs   Lab Test 04/20/20  1157 08/01/19  1500 01/08/19  0840 10/31/17  1037 03/01/16  0648   CHOL 161  --   --  134 199   HDL 42  --   --  57 72   LDL 81  --   --  58 111*   TRIG 192* 177* 135 92 82     A1C    Recent Labs   Lab Test 11/19/22  0212 09/22/16  0741   A1C 4.7 4.8     Troponin    No results for input(s): CTROPT, TROPONINIS, TROPONINI, GHTROP in the last 168 hours.       Data   I have personally spent a total of 35 minutes providing care today, time spent in reviewing medical records and reviewing tests, examining the patient and obtaining history, coordination of care, and discussion with the patient and/or family regarding diagnostic results, prognosis, symptom management, risks and benefits of management options, and development of plan of care. Greater than 50% was spent in counseling and coordination of care.

## 2022-11-20 NOTE — PROGRESS NOTES
Note Infectious Disease Consult Service Progress Note  Covering for [facility]   Pt Name Blanco Osborne   Date 11/20/2022   MRN [id number]     Notes / labs / imaging test results and other data were reviewed    CHIEF COMPLAINT: REASON FOR VISIT    Post cardiac arrest    HPI    58 yo post liver transplant 2016 Nov 12 out of hospital arrest / prolonged resuscitation / gastric intubation for ? Duration  Treated for S pneumo in blood   Noted to have PI 1 in sputum  periph blood WBC up to 85 k      11/19/2022  INTERIM UPDATE    Extubated (just before I entered room )  Pt can move toes, but is not yet verbalizing for me  Appears free of pain    Remainder of 14-point ROS was negative except as listed above    PFSH:  Personal / Family / Social Histories were reviewed and updated  nil new  Vital Signs: BP (!) 88/78   Pulse 74   Temp 97.4  F (36.3  C) (Temporal)   Resp 18   Ht 1.829 m (6')   Wt 102.4 kg (225 lb 12 oz)   SpO2 92%   BMI 30.62 kg/m      EXAM    genl   In ICU  Extubated  Bandage upper lip under nose (some bleeding after extubation)     neuro   Awake  Moves all limbs  No speech for me     lungs   coarse     CV   RRR  No new sounds     skin   No nodules / rash seen by me     abd   (+) bowel sounds  Soft         Data  Microbiology  Nov 15 sputum   A terreus isolated  Nov 13 resp        Parainflu 1 detected  Nov 12 blood      S pneumo isolated    Imaging      Nov 12 Nov 13 Nov 17      LABS  Lab Results   Component Value Date/Time    WBC 28.9 (H) 11/20/2022 11:51 AM    WBC 29.8 (H) 11/20/2022 03:26 AM    WBC 51.8 (HH) 11/19/2022 08:07 PM    WBC 53.1 (HH) 11/19/2022 12:38 PM    WBC 4.0 04/20/2020 11:57 AM    WBC 7.7 10/07/2019 01:57 PM    WBC 3.9 (L) 02/20/2019 11:57 AM    WBC 3.8 (L) 07/10/2018 07:20 AM    AST 33 11/20/2022 03:26 AM    AST 30 11/19/2022 04:47 AM    AST 35 11/18/2022 04:10 AM    AST 75 (H) 11/17/2022 04:16 AM     (H) 04/20/2020 11:57 AM    AST 59 (H) 10/07/2019  01:57 PM    AST 89 (H) 02/20/2019 11:57 AM    AST 40 07/10/2018 07:20 AM    ALT 38 11/20/2022 03:26 AM    ALT 32 11/19/2022 04:47 AM    ALT 35 11/18/2022 04:10 AM    ALT 46 11/17/2022 04:16 AM    ALT 72 (H) 04/20/2020 11:57 AM    ALT 52 10/07/2019 01:57 PM    ALT 74 (H) 02/20/2019 11:57 AM    ALT 37 07/10/2018 07:20 AM    ALKPHOS 217 (H) 11/20/2022 03:26 AM    ALKPHOS 182 (H) 11/19/2022 04:47 AM    ALKPHOS 168 (H) 11/18/2022 04:10 AM    ALKPHOS 176 (H) 11/17/2022 04:16 AM    ALKPHOS 189 (H) 04/20/2020 11:57 AM    ALKPHOS 185 (H) 10/07/2019 01:57 PM    ALKPHOS 118 02/20/2019 11:57 AM    ALKPHOS 110 07/10/2018 07:20 AM           ASSESSMENT & SUGGESTIONS  Patient Active Problem List   Diagnosis Code     Liver transplanted (H) Z94.4     Leukocytosis D72.829     SBP (spontaneous bacterial peritonitis) (H) K65.2     Respiratory arrest (H) R09.2     Acute renal failure  (H) N17.9     Parainfluenza type 1 infection B34.8     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     Acquired immunocompromised state (H) D84.9     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     Med (antibiotic) monitor Z51.81     Sepsis - S pneumoniae (H) A40.3, R65.20, J96.01      S pneumo sepsis - on ceftriaxone / no new indication new complication       PCV20 (Prevnar 20) vaccination after discharge    Parainfluenza 1 (PI 1) - supportive care    Aspergillus - now on voriconazole    Overall improved   Cont as above             Maurice Osborne MD  Covering for [facility]  Infectious Disease service    CURRENT MED REVIEWD  Current Facility-Administered Medications   Medication     0.9% sodium chloride BOLUS     albuterol (PROVENTIL) neb solution 2.5 mg     albuterol (PROVENTIL) neb solution 2.5 mg     amiodarone in NS adult drip std conc 1.8 mg/mL infusion     calcium gluconate 2 g in  mL intermittent infusion     calcium gluconate 4 g in sodium chloride 0.9 % 100 mL intermittent infusion     carboxymethylcellulose PF (REFRESH PLUS) 0.5 % ophthalmic  solution 1 drop     cefTRIAXone (ROCEPHIN) 2 g vial to attach to  ml bag for ADULTS or NS 50 ml bag for PEDS     chlorhexidine (PERIDEX) 0.12 % solution 15 mL     dextrose 10% infusion     glucose gel 15-30 g    Or     dextrose 50 % injection 25-50 mL    Or     glucagon injection 1 mg     dialysate solution (PrismaSol BGK 2/3.5)     fentaNYL (PF) (SUBLIMAZE) injection 25-50 mcg     fentaNYL (PF) (SUBLIMAZE) injection 50 mcg     heparin infusion 25,000 units in 0.45% NaCl 250 mL ANTICOAGULANT     insulin 1 unit/mL in saline (NovoLIN, HumuLIN Regular) drip - ADULT IV Infusion     magnesium sulfate 2 g in water intermittent infusion     medication instruction     Medication Instructions     methylPREDNISolone sodium succinate (solu-MEDROL) injection 62.5 mg     metoprolol (LOPRESSOR) injection 5 mg     multivitamins w/minerals liquid 15 mL     naloxone (NARCAN) injection 0.2 mg    Or     naloxone (NARCAN) injection 0.4 mg    Or     naloxone (NARCAN) injection 0.2 mg    Or     naloxone (NARCAN) injection 0.4 mg     ondansetron (ZOFRAN ODT) ODT tab 4 mg    Or     ondansetron (ZOFRAN) injection 4 mg     oxyCODONE (ROXICODONE) tablet 5 mg     pantoprazole (PROTONIX) 2 mg/mL suspension 40 mg    Or     pantoprazole (PROTONIX) IV push injection 40 mg     polyethylene glycol (MIRALAX) Packet 17 g     Post-Filter replacement solution (PRISMASOL BGK 2/3.5)     potassium chloride 20 mEq in 50 mL intermittent infusion     Pre-Filter replacement solution (Phoxillum BK4/2.5)     prochlorperazine (COMPAZINE) injection 10 mg    Or     prochlorperazine (COMPAZINE) tablet 10 mg    Or     prochlorperazine (COMPAZINE) suppository 25 mg     propofol (DIPRIVAN) infusion     senna-docusate (SENOKOT-S/PERICOLACE) 8.6-50 MG per tablet 1 tablet    Or     senna-docusate (SENOKOT-S/PERICOLACE) 8.6-50 MG per tablet 2 tablet     sodium chloride 0.9% infusion     sodium chloride 0.9% infusion     sodium phosphate 15 mmol in D5W 250mL   intermittent infusion     vecuronium (NORCURON) injection 3.88 mg     vecuronium (NORCURON) injection 7.76 mg     voriconazole (VFEND) 400 mg in sodium chloride 0.9 % 100 mL intermittent infusion

## 2022-11-20 NOTE — PROGRESS NOTES
[facility] ICU RESPIRATORY NOTE        Date of Admission: 11/12/2022    Date of Intubation (most recent): 11/12/22    Reason for Mechanical Ventilation: Respiratory failure    Number of Days on Mechanical Ventilation: 8    Met Criteria for Spontaneous Breathing Trial: Yes     Pt PS 8/5 for 3 h and 20 minutes.     Significant Events Today: None    ABG Results:   Recent Labs   Lab 11/19/22  0554 11/18/22  0411 11/17/22 2040 11/17/22  0416   PH 7.42 7.39 7.41 7.39   PCO2 38 38 34* 37   PO2 154* 74* 85 70*   HCO3 25 23 22 22   O2PER 35 50 50 40       Current Vent Settings: Vent Mode: CMV/AC  (Continuous Mandatory Ventilation/ Assist Control)  FiO2 (%): 35 %  Resp Rate (Set): 24 breaths/min  Tidal Volume (Set, mL): 400 mL  PEEP (cm H2O): 5 cmH2O  Pressure Support (cm H2O): (S) 8 cmH2O  Resp: 25      Skin Assessment: Still have pressure on his upper lip.    Plan: We will continue to monitor on full support tonight and continue with PS tomorrow.    Lisa Craig, RT on 11/19/2022 at 9:15 PM

## 2022-11-20 NOTE — PROGRESS NOTES
ICU Progress Note    Date of Service: 11/20/22    A/P:  57M cirrhosis, CARRILLO s/p liver txp (9/2016) admitted to ICU 11/12/22 after out of hospital PEA arrest and acute hypoxemic respiratory failure. Course c/b LORETTA requiring CRRT. Found to have Parainfluenza virus, Streptococcal bacteremia, and Aspergillus terreus pneumonia.      I have personally reviewed the daily labs, imaging studies, cultures and discussed the case with referring physician and consulting physicians.      Neuro  # Possible post-arrest anoxic brain injury - now awake and following commands  - holding sedation  - neurocritical care c/s      Pulmonary  # Acute hypoxemic respiratory failure  # ARDS  # Bilateral pleural effusions  # Right pneumothorax - chest tube placement 11/16  - vent settings below  - methylprednisolone 62.5mg daily, plan to stop in the next 1-2 days  - albuterol q4h   - chest tube to water seal  - repeat CXR   - PST today     Cardiac  # Out of hospital PEA arrest  # Cardiogenic shock, resolved  # Septic shock, resolved  # Atrial fibrillation w/ RVR - converted to NSR  # Therapeutic temperature management - rewarmed 11/14  - MAP > 65  - amiodarone gtt      Renal  # LORETTA - in the setting of PEA arrest and shock state  - monitor UOP, Cr, I/O  - CRRT  - nephrology c/s       GI  # Protein calorie malnutrition   # CARRILLO s/p liver txp - 9/2016  - TF managed by RD   - holding tacrolimus, on admission level 3.6     Heme/Onc  # Leukocytosis - 2/2 sepsis and inflammatory response, profound increase 11/14, afebrile, however on CRRT  # Thrombocytopenia - in the setting of critical illness and now c/f HIT, 4T score 6. HIT panel negative.   - monitor CBC   - restart heparin gtt     ID  # Streptococcal bacteremia  # Parainfluenza virus   # Aspergillus terreus pneumonia - pt at risk for developing invasive Aspergillosis, attempting to wean steroids off as quickly as may be safely done  - CTX 2g q12h - CNS dosing given that we cannot r/o CNS  infection  - voriconazole   - ID c/s  - monitor Cx data      Endo  # Stress induced hyperglycemia   - monitor BG  - sliding scale insulin      PPX  1. DVT: heparin   2. VAP: HOB 30 degrees, chlorhexidine rinse  3. Stress Ulcer: PPI  4. Restraints: Nonviolent soft two point restraints required and necessary for patient safety and continued cares and good effect as patient continues to pull at necessary lines, tubes despite education and distraction. Will readdress daily.   5. Wound care - per unit routine   6. Feeding - TF  7. Family updated at bedside.    Interval Hx:  NAEON. Now in NSR. Vasopressors off.     Unable to obtain ROS 2/2 sedation/intubation.     BP (!) 88/78   Pulse 71   Temp 97.4  F (36.3  C) (Temporal)   Resp 24   Ht 1.829 m (6')   Wt 102.4 kg (225 lb 12 oz)   SpO2 98%   BMI 30.62 kg/m    Gen: supine, NAD  Neuro: pupils equal, following commands in all 4 extremities   HEENT: anicteric  Card: RRR  Pulm: clear b/l, chest tube C/D/I, no air-leak present   Abd: soft, non-distended  MSK: no edema, no acute joint abnormality  Skin: no obvious rash    Vent Mode: CMV/AC  (Continuous Mandatory Ventilation/ Assist Control)  FiO2 (%): 35 %  Resp Rate (Set): 24 breaths/min  Tidal Volume (Set, mL): 400 mL  PEEP (cm H2O): 5 cmH2O  Pressure Support (cm H2O): (S) 8 cmH2O  Resp: 24        Intake/Output Summary (Last 24 hours) at 11/20/2022 0806  Last data filed at 11/20/2022 0800  Gross per 24 hour   Intake 3853.25 ml   Output 4215 ml   Net -361.75 ml       Labs: reviewed    Imaging: reviewed    Billing: Patient is critically ill. Total critical care time today, excluding procedures, was 45 minutes.    Ki Watts MD  Pulmonary Disease and Critical Care Medicine   [facility]

## 2022-11-20 NOTE — PROGRESS NOTES
Renal Medicine Progress Note            Assessment/Plan:     Blanco Osborne is a 57-year-old male with PMH CARRILLO s/p liver transplant (9/2016) and cirrhosis admitted 11/12/2022 with out of hospital PEA arrest and acute hypoxemic respiratory failure.  Course complicated by parainfluenza virus, streptococcal bacteremia, and LORETTA requiring CRRT.     Anuric LORETTA on dialysis  Due to ATN in setting of PEA arrest and persistent shock.  Urine output negligible. CRRT started for anuric ATN.   -Patient extubated and off pressors, will continue CRRT through today to avoid fluid accumulation and reintubation.  Will reevaluate tomorrow, may potentially be able to stop  -CRRT  ----Volume goal net negative 0-100cc/h   ---- BRENDA sandy   ---- clotting issues resolved with heparin but now with thrombocytopenia, HIT assay negative.  Switch back from argatroban to heparin  -Every 8 hours CRRT labs  -Renally dose meds    PEA arrest  Septic shock, strep pneumo bacteremia  Cardiogenic shock  Currently on Levophed and vasopressin.  Rewarmed 11/14.  Initial concern for anoxic brain injury, neuro crit assessing.  Patient mentating well currently and following commands.    Lactic acidosis, resolved  Improved with CRRT.    ESLD due to CARRILLO s/p liver transplant   - hold tacrolimus  -Tac trough 3.2    Hypoxic respiratory failure  ARDS  Extubated 11/20.  On nasal cannula, satting well.  We will continue CRRT through today, reevaluate tomorrow.    R Pneumothorax   Bilateral pleural effusions  Aspergillus PNA  CT placed 11/16 with large output.     FEN  Electrolytes improved with CRRT.       Dwayne Laboy MD   [facility]  Office: [phone]          Interval History:     -CRRT machine malfunction this morning filter change despite getting changed already overnight  -Awake and following commands today  -Extubated on 3 L nasal cannula  -No other issues with CRRT  -No urine output  -Family updated at bedside         Medications and  Allergies:       albuterol  2.5 mg Nebulization Q6H     artificial tears  1 drop Both Eyes TID     cefTRIAXone  2 g Intravenous Q24H     chlorhexidine  15 mL Mouth/Throat Q12H     methylPREDNISolone  62.5 mg Intravenous Q24H     multivitamins w/minerals  15 mL Per Feeding Tube Daily     pantoprazole  40 mg Per Feeding Tube QAM AC    Or     pantoprazole  40 mg Intravenous QAM AC     polyethylene glycol  17 g Oral Daily     senna-docusate  1 tablet Oral BID    Or     senna-docusate  2 tablet Oral BID     voriconazole  4 mg/kg Intravenous BID      No Active Allergies         Physical Exam:   Vitals were reviewed  BP (!) 88/78   Pulse 73   Temp 97.3  F (36.3  C) (Axillary)   Resp 17   Ht 1.829 m (6')   Wt 102.4 kg (225 lb 12 oz)   SpO2 93%   BMI 30.62 kg/m      Wt Readings from Last 3 Encounters:   11/20/22 102.4 kg (225 lb 12 oz)   10/07/19 137.5 kg (303 lb 3.2 oz)   10/04/19 131.5 kg (290 lb)       Intake/Output Summary (Last 24 hours) at 11/14/2022 1136  Last data filed at 11/14/2022 1100  Gross per 24 hour   Intake 4097.12 ml   Output 2345 ml   Net 1752.12 ml       GENERAL APPEARANCE: Awake, no acute distress  HEENT: Blood around mouth, no longer has ETT in place  RESP: coarse bilaterally   CV: RRR  ABDOMEN: soft, nontender, nondistended   EXTREMITIES/SKIN: 1-2+ LE edema, improvement in upper extremity edema  NEURO: Awake, alert, following commands  LINES:   right internal jugular dialysis catheter           Data:     BMP  Recent Labs   Lab 11/20/22  1543 11/20/22  1347 11/20/22  1151 11/20/22  1150 11/20/22  0331 11/20/22  0326 11/19/22  2011 11/19/22  2007 11/19/22  1241 11/19/22  1238   NA  --   --  136  --   --  137  --  135  --  136   POTASSIUM  --   --  4.2  --   --  4.0  --  4.3  --  3.8   CHLORIDE  --   --  106  --   --  106  --  106  --  105   KELLY  --   --  7.9*  --   --  8.0*  --  8.1*  --  8.3*   CO2  --   --  24  --   --  24  --  23  --  24   BUN  --   --  29  --   --  28  --  30  --  31*   CR  --    --  0.84  --   --  0.76  --  0.87  --  0.84   * 158* 188* 165*   < > 172*   < > 164*   < > 168*    < > = values in this interval not displayed.     CBC  Recent Labs   Lab 11/20/22  1151 11/20/22  0326 11/19/22 2007 11/19/22  1238   WBC 28.9* 29.8* 51.8* 53.1*   HGB 9.3* 9.3* 9.9* 9.8*   HCT 28.5* 28.3* 29.9* 29.6*   * 101* 100 99   PLT 77* 65* 98* 95*     Lab Results   Component Value Date    AST 33 11/20/2022    ALT 38 11/20/2022    ALKPHOS 217 (H) 11/20/2022    BILITOTAL 1.4 (H) 11/20/2022     Lab Results   Component Value Date    INR 1.50 (H) 11/14/2022       Attestation:  I have reviewed today's vital signs, notes, medications, labs and imaging.    Dwayne Laboy MD  [facility] - Nephrology  Office: [phone]

## 2022-11-20 NOTE — PROGRESS NOTES
Extubation Note    Successful completion of SBT (Yes or No):Yes  Extubation time:12:30     Patient assessment:  Lung sounds: diminished/clear  Stridor Present (Yes or No): no  Patient tolerance: Pt tolerated extubation well.     Oxygen device:  Liter flow: 2L NC    SpO2:98%    Plan: Continue to monitor patient.     Temp: 97.4  F (36.3  C) Temp src: Temporal BP: (!) 88/78 Pulse: 73   Resp: 22 SpO2: 94 % O2 Device: Nasal cannula Oxygen Delivery: 3 LPM    Jerry Cerna RT on 11/20/2022 at 2:33 PM

## 2022-11-21 ENCOUNTER — APPOINTMENT (OUTPATIENT)
Dept: GENERAL RADIOLOGY | Facility: CLINIC | Age: 57
DRG: 004 | End: 2022-11-21
Attending: INTERNAL MEDICINE
Payer: COMMERCIAL

## 2022-11-21 ENCOUNTER — DOCUMENTATION ONLY (OUTPATIENT)
Dept: TRANSPLANT | Facility: CLINIC | Age: 57
End: 2022-11-21

## 2022-11-21 LAB
ALBUMIN SERPL-MCNC: 1.9 G/DL (ref 3.4–5)
ALBUMIN SERPL-MCNC: 1.9 G/DL (ref 3.4–5)
ALBUMIN SERPL-MCNC: 2.1 G/DL (ref 3.4–5)
ALP SERPL-CCNC: 209 U/L (ref 40–150)
ALT SERPL W P-5'-P-CCNC: 42 U/L (ref 0–70)
ANION GAP SERPL CALCULATED.3IONS-SCNC: 11 MMOL/L (ref 3–14)
ANION GAP SERPL CALCULATED.3IONS-SCNC: 3 MMOL/L (ref 3–14)
ANION GAP SERPL CALCULATED.3IONS-SCNC: 8 MMOL/L (ref 3–14)
AST SERPL W P-5'-P-CCNC: 36 U/L (ref 0–45)
BACTERIA PLR CULT: NO GROWTH
BASE EXCESS BLDA CALC-SCNC: -0.7 MMOL/L (ref -9–1.8)
BASE EXCESS BLDA CALC-SCNC: -5 MMOL/L (ref -9–1.8)
BASE EXCESS BLDA CALC-SCNC: 0.1 MMOL/L (ref -9–1.8)
BASE EXCESS BLDA CALC-SCNC: 0.5 MMOL/L (ref -9–1.8)
BILIRUB DIRECT SERPL-MCNC: 1.2 MG/DL (ref 0–0.2)
BILIRUB SERPL-MCNC: 1.4 MG/DL (ref 0.2–1.3)
BUN SERPL-MCNC: 23 MG/DL (ref 7–30)
BUN SERPL-MCNC: 24 MG/DL (ref 7–30)
BUN SERPL-MCNC: 27 MG/DL (ref 7–30)
CA-I BLD-MCNC: 4.6 MG/DL (ref 4.4–5.2)
CA-I BLD-MCNC: 4.6 MG/DL (ref 4.4–5.2)
CA-I BLD-MCNC: 4.7 MG/DL (ref 4.4–5.2)
CA-I BLD-MCNC: 4.9 MG/DL (ref 4.4–5.2)
CALCIUM SERPL-MCNC: 8.2 MG/DL (ref 8.5–10.1)
CALCIUM SERPL-MCNC: 8.2 MG/DL (ref 8.5–10.1)
CALCIUM SERPL-MCNC: 8.4 MG/DL (ref 8.5–10.1)
CHLORIDE BLD-SCNC: 106 MMOL/L (ref 94–109)
CHLORIDE BLD-SCNC: 107 MMOL/L (ref 94–109)
CHLORIDE BLD-SCNC: 107 MMOL/L (ref 94–109)
CO2 SERPL-SCNC: 21 MMOL/L (ref 20–32)
CO2 SERPL-SCNC: 21 MMOL/L (ref 20–32)
CO2 SERPL-SCNC: 27 MMOL/L (ref 20–32)
CREAT SERPL-MCNC: 0.76 MG/DL (ref 0.66–1.25)
CREAT SERPL-MCNC: 0.78 MG/DL (ref 0.66–1.25)
CREAT SERPL-MCNC: 0.81 MG/DL (ref 0.66–1.25)
ERYTHROCYTE [DISTWIDTH] IN BLOOD BY AUTOMATED COUNT: 17.5 % (ref 10–15)
ERYTHROCYTE [DISTWIDTH] IN BLOOD BY AUTOMATED COUNT: 17.5 % (ref 10–15)
ERYTHROCYTE [DISTWIDTH] IN BLOOD BY AUTOMATED COUNT: 18.2 % (ref 10–15)
GFR SERPL CREATININE-BSD FRML MDRD: >90 ML/MIN/1.73M2
GLUCOSE BLD-MCNC: 116 MG/DL (ref 70–99)
GLUCOSE BLD-MCNC: 120 MG/DL (ref 70–99)
GLUCOSE BLD-MCNC: 146 MG/DL (ref 70–99)
GLUCOSE BLDC GLUCOMTR-MCNC: 100 MG/DL (ref 70–99)
GLUCOSE BLDC GLUCOMTR-MCNC: 105 MG/DL (ref 70–99)
GLUCOSE BLDC GLUCOMTR-MCNC: 110 MG/DL (ref 70–99)
GLUCOSE BLDC GLUCOMTR-MCNC: 111 MG/DL (ref 70–99)
GLUCOSE BLDC GLUCOMTR-MCNC: 112 MG/DL (ref 70–99)
GLUCOSE BLDC GLUCOMTR-MCNC: 123 MG/DL (ref 70–99)
GLUCOSE BLDC GLUCOMTR-MCNC: 79 MG/DL (ref 70–99)
HCO3 BLD-SCNC: 23 MMOL/L (ref 21–28)
HCO3 BLD-SCNC: 24 MMOL/L (ref 21–28)
HCO3 BLD-SCNC: 25 MMOL/L (ref 21–28)
HCO3 BLD-SCNC: 27 MMOL/L (ref 21–28)
HCT VFR BLD AUTO: 31.3 % (ref 40–53)
HCT VFR BLD AUTO: 31.8 % (ref 40–53)
HCT VFR BLD AUTO: 32.4 % (ref 40–53)
HGB BLD-MCNC: 10 G/DL (ref 13.3–17.7)
MAGNESIUM SERPL-MCNC: 2.2 MG/DL (ref 1.6–2.3)
MAGNESIUM SERPL-MCNC: 2.2 MG/DL (ref 1.6–2.3)
MAGNESIUM SERPL-MCNC: 2.3 MG/DL (ref 1.6–2.3)
MCH RBC QN AUTO: 33.2 PG (ref 26.5–33)
MCH RBC QN AUTO: 33.6 PG (ref 26.5–33)
MCH RBC QN AUTO: 33.8 PG (ref 26.5–33)
MCHC RBC AUTO-ENTMCNC: 30.9 G/DL (ref 31.5–36.5)
MCHC RBC AUTO-ENTMCNC: 31.4 G/DL (ref 31.5–36.5)
MCHC RBC AUTO-ENTMCNC: 31.9 G/DL (ref 31.5–36.5)
MCV RBC AUTO: 106 FL (ref 78–100)
MCV RBC AUTO: 107 FL (ref 78–100)
MCV RBC AUTO: 108 FL (ref 78–100)
O2/TOTAL GAS SETTING VFR VENT: 40 %
O2/TOTAL GAS SETTING VFR VENT: 50 %
O2/TOTAL GAS SETTING VFR VENT: 50 %
O2/TOTAL GAS SETTING VFR VENT: 60 %
OXYHGB MFR BLD: 84 % (ref 92–100)
PCO2 BLD: 36 MM HG (ref 35–45)
PCO2 BLD: 96 MM HG (ref 35–45)
PH BLD: 7.06 [PH] (ref 7.35–7.45)
PH BLD: 7.43 [PH] (ref 7.35–7.45)
PH BLD: 7.43 [PH] (ref 7.35–7.45)
PH BLD: 7.44 [PH] (ref 7.35–7.45)
PHOSPHATE SERPL-MCNC: 3.6 MG/DL (ref 2.5–4.5)
PHOSPHATE SERPL-MCNC: 3.7 MG/DL (ref 2.5–4.5)
PHOSPHATE SERPL-MCNC: 4.8 MG/DL (ref 2.5–4.5)
PLATELET # BLD AUTO: 130 10E3/UL (ref 150–450)
PLATELET # BLD AUTO: 222 10E3/UL (ref 150–450)
PLATELET # BLD AUTO: 267 10E3/UL (ref 150–450)
PO2 BLD: 66 MM HG (ref 80–105)
PO2 BLD: 70 MM HG (ref 80–105)
PO2 BLD: 83 MM HG (ref 80–105)
PO2 BLD: 87 MM HG (ref 80–105)
POTASSIUM BLD-SCNC: 4.5 MMOL/L (ref 3.4–5.3)
POTASSIUM BLD-SCNC: 4.5 MMOL/L (ref 3.4–5.3)
POTASSIUM BLD-SCNC: 4.6 MMOL/L (ref 3.4–5.3)
PROT SERPL-MCNC: 6.2 G/DL (ref 6.8–8.8)
RBC # BLD AUTO: 2.96 10E6/UL (ref 4.4–5.9)
RBC # BLD AUTO: 2.98 10E6/UL (ref 4.4–5.9)
RBC # BLD AUTO: 3.01 10E6/UL (ref 4.4–5.9)
SODIUM SERPL-SCNC: 136 MMOL/L (ref 133–144)
SODIUM SERPL-SCNC: 137 MMOL/L (ref 133–144)
SODIUM SERPL-SCNC: 138 MMOL/L (ref 133–144)
UFH PPP CHRO-ACNC: <0.1 IU/ML
WBC # BLD AUTO: 41 10E3/UL (ref 4–11)
WBC # BLD AUTO: 48.6 10E3/UL (ref 4–11)
WBC # BLD AUTO: 52.4 10E3/UL (ref 4–11)

## 2022-11-21 PROCEDURE — 258N000003 HC RX IP 258 OP 636: Performed by: SURGERY

## 2022-11-21 PROCEDURE — 258N000003 HC RX IP 258 OP 636: Performed by: INTERNAL MEDICINE

## 2022-11-21 PROCEDURE — 82805 BLOOD GASES W/O2 SATURATION: CPT | Performed by: INTERNAL MEDICINE

## 2022-11-21 PROCEDURE — 82330 ASSAY OF CALCIUM: CPT | Performed by: STUDENT IN AN ORGANIZED HEALTH CARE EDUCATION/TRAINING PROGRAM

## 2022-11-21 PROCEDURE — 84460 ALANINE AMINO (ALT) (SGPT): CPT | Performed by: INTERNAL MEDICINE

## 2022-11-21 PROCEDURE — 250N000013 HC RX MED GY IP 250 OP 250 PS 637: Performed by: INTERNAL MEDICINE

## 2022-11-21 PROCEDURE — 5A1935Z RESPIRATORY VENTILATION, LESS THAN 24 CONSECUTIVE HOURS: ICD-10-PCS | Performed by: INTERNAL MEDICINE

## 2022-11-21 PROCEDURE — 99223 1ST HOSP IP/OBS HIGH 75: CPT | Mod: 25 | Performed by: INTERNAL MEDICINE

## 2022-11-21 PROCEDURE — 258N000003 HC RX IP 258 OP 636: Performed by: STUDENT IN AN ORGANIZED HEALTH CARE EDUCATION/TRAINING PROGRAM

## 2022-11-21 PROCEDURE — 250N000013 HC RX MED GY IP 250 OP 250 PS 637: Performed by: STUDENT IN AN ORGANIZED HEALTH CARE EDUCATION/TRAINING PROGRAM

## 2022-11-21 PROCEDURE — 250N000009 HC RX 250: Performed by: INTERNAL MEDICINE

## 2022-11-21 PROCEDURE — 250N000009 HC RX 250

## 2022-11-21 PROCEDURE — 999N000157 HC STATISTIC RCP TIME EA 10 MIN

## 2022-11-21 PROCEDURE — 84100 ASSAY OF PHOSPHORUS: CPT | Performed by: STUDENT IN AN ORGANIZED HEALTH CARE EDUCATION/TRAINING PROGRAM

## 2022-11-21 PROCEDURE — 999N000065 XR ABDOMEN PORT 1 VIEW

## 2022-11-21 PROCEDURE — 83735 ASSAY OF MAGNESIUM: CPT | Performed by: STUDENT IN AN ORGANIZED HEALTH CARE EDUCATION/TRAINING PROGRAM

## 2022-11-21 PROCEDURE — 99233 SBSQ HOSP IP/OBS HIGH 50: CPT | Performed by: SPECIALIST

## 2022-11-21 PROCEDURE — 250N000009 HC RX 250: Performed by: SURGERY

## 2022-11-21 PROCEDURE — 85520 HEPARIN ASSAY: CPT | Performed by: STUDENT IN AN ORGANIZED HEALTH CARE EDUCATION/TRAINING PROGRAM

## 2022-11-21 PROCEDURE — 82803 BLOOD GASES ANY COMBINATION: CPT | Performed by: INTERNAL MEDICINE

## 2022-11-21 PROCEDURE — 999N000065 XR CHEST PORT 1 VIEW

## 2022-11-21 PROCEDURE — 200N000001 HC R&B ICU

## 2022-11-21 PROCEDURE — 82330 ASSAY OF CALCIUM: CPT | Performed by: SURGERY

## 2022-11-21 PROCEDURE — 250N000011 HC RX IP 250 OP 636: Performed by: STUDENT IN AN ORGANIZED HEALTH CARE EDUCATION/TRAINING PROGRAM

## 2022-11-21 PROCEDURE — 99233 SBSQ HOSP IP/OBS HIGH 50: CPT | Performed by: INTERNAL MEDICINE

## 2022-11-21 PROCEDURE — 31500 INSERT EMERGENCY AIRWAY: CPT | Mod: GC | Performed by: INTERNAL MEDICINE

## 2022-11-21 PROCEDURE — 94640 AIRWAY INHALATION TREATMENT: CPT | Mod: 76

## 2022-11-21 PROCEDURE — 85027 COMPLETE CBC AUTOMATED: CPT | Performed by: STUDENT IN AN ORGANIZED HEALTH CARE EDUCATION/TRAINING PROGRAM

## 2022-11-21 PROCEDURE — 94002 VENT MGMT INPAT INIT DAY: CPT

## 2022-11-21 PROCEDURE — 94640 AIRWAY INHALATION TREATMENT: CPT

## 2022-11-21 PROCEDURE — 250N000011 HC RX IP 250 OP 636: Performed by: SURGERY

## 2022-11-21 PROCEDURE — 250N000011 HC RX IP 250 OP 636: Performed by: SPECIALIST

## 2022-11-21 PROCEDURE — C9113 INJ PANTOPRAZOLE SODIUM, VIA: HCPCS | Performed by: STUDENT IN AN ORGANIZED HEALTH CARE EDUCATION/TRAINING PROGRAM

## 2022-11-21 PROCEDURE — 250N000011 HC RX IP 250 OP 636: Performed by: INTERNAL MEDICINE

## 2022-11-21 PROCEDURE — 82248 BILIRUBIN DIRECT: CPT | Performed by: INTERNAL MEDICINE

## 2022-11-21 PROCEDURE — 71045 X-RAY EXAM CHEST 1 VIEW: CPT

## 2022-11-21 PROCEDURE — 94799 UNLISTED PULMONARY SVC/PX: CPT

## 2022-11-21 RX ORDER — IPRATROPIUM BROMIDE AND ALBUTEROL SULFATE 2.5; .5 MG/3ML; MG/3ML
3 SOLUTION RESPIRATORY (INHALATION)
Status: DISCONTINUED | OUTPATIENT
Start: 2022-11-21 | End: 2022-12-15 | Stop reason: HOSPADM

## 2022-11-21 RX ORDER — NOREPINEPHRINE BITARTRATE 0.02 MG/ML
.01-.6 INJECTION, SOLUTION INTRAVENOUS CONTINUOUS
Status: DISCONTINUED | OUTPATIENT
Start: 2022-11-21 | End: 2022-11-23

## 2022-11-21 RX ORDER — VORICONAZOLE 40 MG/ML
300 POWDER, FOR SUSPENSION ORAL EVERY 12 HOURS SCHEDULED
Status: DISCONTINUED | OUTPATIENT
Start: 2022-11-21 | End: 2022-11-24

## 2022-11-21 RX ORDER — CHLORHEXIDINE GLUCONATE ORAL RINSE 1.2 MG/ML
15 SOLUTION DENTAL EVERY 12 HOURS
Status: DISCONTINUED | OUTPATIENT
Start: 2022-11-21 | End: 2022-12-13

## 2022-11-21 RX ORDER — CALCIUM CHLORIDE, MAGNESIUM CHLORIDE, SODIUM CHLORIDE, SODIUM BICARBONATE, POTASSIUM CHLORIDE AND SODIUM PHOSPHATE DIBASIC DIHYDRATE 3.68; 3.05; 6.34; 3.09; .314; .187 G/L; G/L; G/L; G/L; G/L; G/L
INJECTION INTRAVENOUS CONTINUOUS
Status: DISCONTINUED | OUTPATIENT
Start: 2022-11-21 | End: 2022-11-22

## 2022-11-21 RX ORDER — NOREPINEPHRINE BITARTRATE 0.02 MG/ML
INJECTION, SOLUTION INTRAVENOUS
Status: COMPLETED
Start: 2022-11-21 | End: 2022-11-21

## 2022-11-21 RX ORDER — DEXTROSE MONOHYDRATE 25 G/50ML
25-50 INJECTION, SOLUTION INTRAVENOUS
Status: DISCONTINUED | OUTPATIENT
Start: 2022-11-21 | End: 2022-12-15 | Stop reason: HOSPADM

## 2022-11-21 RX ORDER — ACETYLCYSTEINE 200 MG/ML
2 SOLUTION ORAL; RESPIRATORY (INHALATION) 4 TIMES DAILY
Status: DISCONTINUED | OUTPATIENT
Start: 2022-11-21 | End: 2022-11-26

## 2022-11-21 RX ORDER — ETOMIDATE 2 MG/ML
20 INJECTION INTRAVENOUS ONCE
Status: COMPLETED | OUTPATIENT
Start: 2022-11-21 | End: 2022-11-21

## 2022-11-21 RX ORDER — PHENYLEPHRINE HCL IN 0.9% NACL 50MG/250ML
.1-6 PLASTIC BAG, INJECTION (ML) INTRAVENOUS CONTINUOUS
Status: DISCONTINUED | OUTPATIENT
Start: 2022-11-21 | End: 2022-11-23

## 2022-11-21 RX ORDER — NICOTINE POLACRILEX 4 MG
15-30 LOZENGE BUCCAL
Status: DISCONTINUED | OUTPATIENT
Start: 2022-11-21 | End: 2022-12-15 | Stop reason: HOSPADM

## 2022-11-21 RX ORDER — PROPOFOL 10 MG/ML
5-75 INJECTION, EMULSION INTRAVENOUS CONTINUOUS
Status: DISCONTINUED | OUTPATIENT
Start: 2022-11-21 | End: 2022-11-28

## 2022-11-21 RX ADMIN — CALCIUM CHLORIDE, MAGNESIUM CHLORIDE, DEXTROSE MONOHYDRATE, LACTIC ACID, SODIUM CHLORIDE, SODIUM BICARBONATE AND POTASSIUM CHLORIDE: 5.15; 2.03; 22; 5.4; 6.46; 3.09; .157 INJECTION INTRAVENOUS at 02:14

## 2022-11-21 RX ADMIN — PROPOFOL 5 MCG/KG/MIN: 10 INJECTION, EMULSION INTRAVENOUS at 09:00

## 2022-11-21 RX ADMIN — PHENYLEPHRINE HYDROCHLORIDE 1.4 MCG/KG/MIN: 10 INJECTION INTRAVENOUS at 18:44

## 2022-11-21 RX ADMIN — CALCIUM CHLORIDE, MAGNESIUM CHLORIDE, SODIUM CHLORIDE, SODIUM BICARBONATE, POTASSIUM CHLORIDE AND SODIUM PHOSPHATE DIBASIC DIHYDRATE 5000 ML: 3.68; 3.05; 6.34; 3.09; .314; .187 INJECTION INTRAVENOUS at 01:34

## 2022-11-21 RX ADMIN — PHENYLEPHRINE HYDROCHLORIDE 1.6 MCG/KG/MIN: 10 INJECTION INTRAVENOUS at 23:48

## 2022-11-21 RX ADMIN — PROPOFOL 20 MCG/KG/MIN: 10 INJECTION, EMULSION INTRAVENOUS at 18:46

## 2022-11-21 RX ADMIN — FENTANYL CITRATE 50 MCG: 50 INJECTION INTRAMUSCULAR; INTRAVENOUS at 01:09

## 2022-11-21 RX ADMIN — IPRATROPIUM BROMIDE AND ALBUTEROL SULFATE 3 ML: .5; 3 SOLUTION RESPIRATORY (INHALATION) at 13:45

## 2022-11-21 RX ADMIN — HYDROCORTISONE SODIUM SUCCINATE 20 MG: 100 INJECTION, POWDER, FOR SOLUTION INTRAMUSCULAR; INTRAVENOUS at 16:48

## 2022-11-21 RX ADMIN — CALCIUM CHLORIDE, MAGNESIUM CHLORIDE, DEXTROSE MONOHYDRATE, LACTIC ACID, SODIUM CHLORIDE, SODIUM BICARBONATE AND POTASSIUM CHLORIDE 5000 ML: 5.15; 2.03; 22; 5.4; 6.46; 3.09; .157 INJECTION INTRAVENOUS at 17:40

## 2022-11-21 RX ADMIN — ACETYLCYSTEINE 2 ML: 200 SOLUTION ORAL; RESPIRATORY (INHALATION) at 13:45

## 2022-11-21 RX ADMIN — CALCIUM CHLORIDE, MAGNESIUM CHLORIDE, SODIUM CHLORIDE, SODIUM BICARBONATE, POTASSIUM CHLORIDE AND SODIUM PHOSPHATE DIBASIC DIHYDRATE 5000 ML: 3.68; 3.05; 6.34; 3.09; .314; .187 INJECTION INTRAVENOUS at 02:18

## 2022-11-21 RX ADMIN — Medication 0.03 MCG/KG/MIN: at 08:38

## 2022-11-21 RX ADMIN — CALCIUM CHLORIDE, MAGNESIUM CHLORIDE, SODIUM CHLORIDE, SODIUM BICARBONATE, POTASSIUM CHLORIDE AND SODIUM PHOSPHATE DIBASIC DIHYDRATE 5000 ML: 3.68; 3.05; 6.34; 3.09; .314; .187 INJECTION INTRAVENOUS at 21:44

## 2022-11-21 RX ADMIN — Medication 25 MCG/HR: at 09:39

## 2022-11-21 RX ADMIN — ETOMIDATE 20 MG: 2 INJECTION, SOLUTION INTRAVENOUS at 08:30

## 2022-11-21 RX ADMIN — PHENYLEPHRINE HYDROCHLORIDE 0.5 MCG/KG/MIN: 10 INJECTION INTRAVENOUS at 12:28

## 2022-11-21 RX ADMIN — CALCIUM CHLORIDE, MAGNESIUM CHLORIDE, SODIUM CHLORIDE, SODIUM BICARBONATE, POTASSIUM CHLORIDE AND SODIUM PHOSPHATE DIBASIC DIHYDRATE 5000 ML: 3.68; 3.05; 6.34; 3.09; .314; .187 INJECTION INTRAVENOUS at 17:40

## 2022-11-21 RX ADMIN — VORICONAZOLE 300 MG: 40 POWDER, FOR SUSPENSION ORAL at 20:35

## 2022-11-21 RX ADMIN — AMIODARONE HYDROCHLORIDE 0.5 MG/MIN: 50 INJECTION, SOLUTION INTRAVENOUS at 11:36

## 2022-11-21 RX ADMIN — METHYLPREDNISOLONE SODIUM SUCCINATE 62.5 MG: 125 INJECTION, POWDER, FOR SOLUTION INTRAMUSCULAR; INTRAVENOUS at 10:09

## 2022-11-21 RX ADMIN — CHLORHEXIDINE GLUCONATE 0.12% ORAL RINSE 15 ML: 1.2 LIQUID ORAL at 20:35

## 2022-11-21 RX ADMIN — ACETYLCYSTEINE 2 ML: 200 SOLUTION ORAL; RESPIRATORY (INHALATION) at 20:05

## 2022-11-21 RX ADMIN — SODIUM CHLORIDE 400 MG: 9 INJECTION, SOLUTION INTRAVENOUS at 09:23

## 2022-11-21 RX ADMIN — PANTOPRAZOLE SODIUM 40 MG: 40 INJECTION, POWDER, FOR SOLUTION INTRAVENOUS at 06:51

## 2022-11-21 RX ADMIN — SODIUM CHLORIDE, POTASSIUM CHLORIDE, SODIUM LACTATE AND CALCIUM CHLORIDE 500 ML: 600; 310; 30; 20 INJECTION, SOLUTION INTRAVENOUS at 09:00

## 2022-11-21 RX ADMIN — HYDROCORTISONE SODIUM SUCCINATE 20 MG: 100 INJECTION, POWDER, FOR SOLUTION INTRAMUSCULAR; INTRAVENOUS at 22:19

## 2022-11-21 RX ADMIN — Medication 50 MCG: at 15:30

## 2022-11-21 RX ADMIN — CALCIUM CHLORIDE, MAGNESIUM CHLORIDE, DEXTROSE MONOHYDRATE, LACTIC ACID, SODIUM CHLORIDE, SODIUM BICARBONATE AND POTASSIUM CHLORIDE 5000 ML: 5.15; 2.03; 22; 5.4; 6.46; 3.09; .157 INJECTION INTRAVENOUS at 12:18

## 2022-11-21 RX ADMIN — CALCIUM CHLORIDE, MAGNESIUM CHLORIDE, DEXTROSE MONOHYDRATE, LACTIC ACID, SODIUM CHLORIDE, SODIUM BICARBONATE AND POTASSIUM CHLORIDE 5000 ML: 5.15; 2.03; 22; 5.4; 6.46; 3.09; .157 INJECTION INTRAVENOUS at 22:36

## 2022-11-21 RX ADMIN — CALCIUM CHLORIDE, MAGNESIUM CHLORIDE, SODIUM CHLORIDE, SODIUM BICARBONATE, POTASSIUM CHLORIDE AND SODIUM PHOSPHATE DIBASIC DIHYDRATE 5000 ML: 3.68; 3.05; 6.34; 3.09; .314; .187 INJECTION INTRAVENOUS at 10:55

## 2022-11-21 RX ADMIN — Medication 1 DROP: at 16:49

## 2022-11-21 RX ADMIN — Medication 1 DROP: at 09:27

## 2022-11-21 RX ADMIN — CALCIUM CHLORIDE, MAGNESIUM CHLORIDE, DEXTROSE MONOHYDRATE, LACTIC ACID, SODIUM CHLORIDE, SODIUM BICARBONATE AND POTASSIUM CHLORIDE: 5.15; 2.03; 22; 5.4; 6.46; 3.09; .157 INJECTION INTRAVENOUS at 07:21

## 2022-11-21 RX ADMIN — CALCIUM CHLORIDE, MAGNESIUM CHLORIDE, SODIUM CHLORIDE, SODIUM BICARBONATE, POTASSIUM CHLORIDE AND SODIUM PHOSPHATE DIBASIC DIHYDRATE 5000 ML: 3.68; 3.05; 6.34; 3.09; .314; .187 INJECTION INTRAVENOUS at 08:57

## 2022-11-21 RX ADMIN — CALCIUM CHLORIDE, MAGNESIUM CHLORIDE, SODIUM CHLORIDE, SODIUM BICARBONATE, POTASSIUM CHLORIDE AND SODIUM PHOSPHATE DIBASIC DIHYDRATE 5000 ML: 3.68; 3.05; 6.34; 3.09; .314; .187 INJECTION INTRAVENOUS at 04:33

## 2022-11-21 RX ADMIN — Medication 1 DROP: at 22:28

## 2022-11-21 RX ADMIN — Medication 15 ML: at 16:48

## 2022-11-21 RX ADMIN — NOREPINEPHRINE BITARTRATE 0.03 MCG/KG/MIN: 0.02 INJECTION, SOLUTION INTRAVENOUS at 08:38

## 2022-11-21 RX ADMIN — CALCIUM CHLORIDE, MAGNESIUM CHLORIDE, SODIUM CHLORIDE, SODIUM BICARBONATE, POTASSIUM CHLORIDE AND SODIUM PHOSPHATE DIBASIC DIHYDRATE 5000 ML: 3.68; 3.05; 6.34; 3.09; .314; .187 INJECTION INTRAVENOUS at 14:00

## 2022-11-21 RX ADMIN — IPRATROPIUM BROMIDE AND ALBUTEROL SULFATE 3 ML: .5; 3 SOLUTION RESPIRATORY (INHALATION) at 20:03

## 2022-11-21 RX ADMIN — CEFTRIAXONE SODIUM 2 G: 2 INJECTION, POWDER, FOR SOLUTION INTRAMUSCULAR; INTRAVENOUS at 04:02

## 2022-11-21 RX ADMIN — CALCIUM CHLORIDE, MAGNESIUM CHLORIDE, SODIUM CHLORIDE, SODIUM BICARBONATE, POTASSIUM CHLORIDE AND SODIUM PHOSPHATE DIBASIC DIHYDRATE 5000 ML: 3.68; 3.05; 6.34; 3.09; .314; .187 INJECTION INTRAVENOUS at 06:51

## 2022-11-21 RX ADMIN — NALOXONE HYDROCHLORIDE 0.4 MG: 0.4 INJECTION, SOLUTION INTRAMUSCULAR; INTRAVENOUS; SUBCUTANEOUS at 01:33

## 2022-11-21 RX ADMIN — ONDANSETRON 4 MG: 2 INJECTION INTRAMUSCULAR; INTRAVENOUS at 01:33

## 2022-11-21 RX ADMIN — CALCIUM CHLORIDE, MAGNESIUM CHLORIDE, DEXTROSE MONOHYDRATE, LACTIC ACID, SODIUM CHLORIDE, SODIUM BICARBONATE AND POTASSIUM CHLORIDE: 5.15; 2.03; 22; 5.4; 6.46; 3.09; .157 INJECTION INTRAVENOUS at 12:18

## 2022-11-21 RX ADMIN — ROCURONIUM BROMIDE 50 MG: 50 INJECTION, SOLUTION INTRAVENOUS at 08:30

## 2022-11-21 ASSESSMENT — ACTIVITIES OF DAILY LIVING (ADL)
ADLS_ACUITY_SCORE: 51

## 2022-11-21 NOTE — PROGRESS NOTES
Update to Dr. Simms.  Still in ICU, team is holding tacrolimus.  Did receive high dose steroid.  Blanco awake and following commands, pressors off.

## 2022-11-21 NOTE — PROGRESS NOTES
Message from Dr. Simms:    I spoke with the MD over there and recommended he just be on Prednisone 10 mg qday.

## 2022-11-21 NOTE — PROGRESS NOTES
CLINICAL NUTRITION SERVICES - REASSESSMENT NOTE      Recommendations Ordered by Registered Dietitian (RD): Resume TF as patient was previously receiving --> Promote at 80 mL/hr to provide:  1920 kcal (22 kcal/kg), 121 g protein (1.4 g/kg), 1610 mL H2O, 249 g CHO, 0 g fiber   Total with Propofol = 2081 kcal (24 kcal/kg)   Malnutrition: (11/14)  % Weight Loss:  Weight loss does not meet criteria for malnutrition - intentional weight loss over past >1 year   % Intake:  Decreased intake does not meet criteria for malnutrition - intentional calorie deficit   Subcutaneous Fat Loss:  None observed  Muscle Loss:  None observed  Fluid Retention:  Mild      Malnutrition Diagnosis: Patient does not meet two of the above criteria necessary for diagnosing malnutrition       EVALUATION OF PROGRESS TOWARD GOALS   Diet:  NPO on vent     Nutrition Support:  Patient's TF began to advance on 11/17, goal was achieved on 11/19, but TF was then turned off with extubation on 11/20.  TF remains off today.    Intake/Tolerance:    K/Mg/Phos normal  BGM  on Insulin drip   BM x 2 yesterday - Holding bowel meds   I/O 1569/2680, wt 102.2 kg - CRRT continues         ASSESSED NUTRITION NEEDS:  Dosing Weight 86 kg (adjusted - using admit wt of 101 kg and IBW 81 kg)  Estimated Energy Needs: 5602-7077 kcals (20-25 Kcal/Kg)  Justification: overweight and vented   Estimated Protein Needs: 100-130 grams protein (1.2-1.5 g pro/Kg)  Justification: hypercatabolism with critical illness and preservation of lean body mass  Estimated Fluid Needs: 7510-9225  mL (1 mL/Kcal)  Justification: maintenance or per provider pending fluid status       NEW FINDINGS:   11/20:  Extubated   11/21:  Re-intubated   11/21:  OG placed     Pressor x 2  CRRT continues, noted potential for switch to IHD tomorrow     Receiving Certavite daily per [facility]OC[facility] protocol     Previous Goals (11/17):   TF will meet % nutrition needs in the next 48 hours   Evaluation: Not  met    Previous Nutrition Diagnosis (11/17):   Inadequate protein-energy intake related to NPO on mechanical ventilation, trophic TF x 3 days with hemodynamic instability as evidenced by currently meeting 14% energy needs and 15% protein needs with TF  Evaluation: Declining      CURRENT NUTRITION DIAGNOSIS  Inadequate protein-energy intake related to NPO, now re-intubated with plans to resume TF today as evidenced by meeting 0% protein and 5% energy needs from Propofol     INTERVENTIONS  Recommendations / Nutrition Prescription  Resume TF as patient was previously receiving --> Promote at 80 mL/hr to provide:  1920 kcal (22 kcal/kg), 121 g protein (1.4 g/kg), 1610 mL H2O, 249 g CHO, 0 g fiber   Total with Propofol = 2081 kcal (24 kcal/kg)    Implementation  EN Composition, EN Schedule:  Orders written to resume TF at 20 mL/hr and increase every 4 hours by 20 mL to goal.   Collaboration and Referral of Nutrition care:  Patient discussed today during interdisciplinary bedside rounds.     Goals  TF + Propofol will meet % needs     MONITORING AND EVALUATION:  Progress towards goals will be monitored and evaluated per protocol and Practice Guidelines    Martha Sandoval RD, LD, CNSC   Clinical Dietitian - [facility]

## 2022-11-21 NOTE — PLAN OF CARE
5LNC  Heparin, amiodarone, insulin    More sleep tonight  Difficulty speaking  Tolerating CRRT  Minimal chest tube output  On/Off Gregorio Huakiner      Problem: CRRT (Continuous Renal Replacement Therapy)  Goal: Body Temperature Maintained in Desired Range  Outcome: Not Progressing     Problem: Risk for Delirium  Goal: Improved Attention and Thought Clarity  Outcome: Progressing  Goal: Improved Sleep  Outcome: Progressing     Problem: Pneumonia  Goal: Fluid Balance  Outcome: Progressing  Goal: Effective Oxygenation and Ventilation  Outcome: Progressing  Intervention: Promote Airway Secretion Clearance  Recent Flowsheet Documentation  Taken 11/21/2022 0400 by Jana Chu RN  Cough And Deep Breathing: refused  Taken 11/21/2022 0000 by Jana Chu RN  Cough And Deep Breathing: refused  Taken 11/20/2022 2000 by Jana Chu RN  Cough And Deep Breathing: refused  Intervention: Optimize Oxygenation and Ventilation  Recent Flowsheet Documentation  Taken 11/21/2022 0600 by Jana Chu RN  Head of Bed (HOB) Positioning: HOB at 30 degrees  Taken 11/21/2022 0400 by Jana Chu RN  Head of Bed (HOB) Positioning: HOB at 30 degrees  Taken 11/21/2022 0200 by Jana Chu RN  Head of Bed (HOB) Positioning: HOB at 30 degrees  Taken 11/21/2022 0000 by Jana Chu RN  Head of Bed (HOB) Positioning: HOB at 30 degrees  Taken 11/20/2022 2200 by Jana Chu RN  Head of Bed (HOB) Positioning: HOB at 30 degrees  Taken 11/20/2022 2000 by Jana Chu RN  Head of Bed (HOB) Positioning: HOB at 30 degrees     Problem: CRRT (Continuous Renal Replacement Therapy)  Goal: Safe, Effective Therapy Delivery  Outcome: Progressing  Goal: Hemodynamic Stability  Outcome: Progressing     Problem: Plan of Care - These are the overarching goals to be used throughout the patient stay.    Goal: Patient-Specific Goal (Individualized)  Description: You can add care plan individualizations to a care plan. Examples of  "Individualization might be:  \"Parent requests to be called daily at 9am for status\", \"I have a hard time hearing out of my right ear\", or \"Do not touch me to wake me up as it startles me\".  Recent Flowsheet Documentation  Taken 11/20/2022 2000 by Jana Chu RN  Anxieties, Fears or Concerns: Relayed that wife would like \"a spot saved for visiting tomorrow\"  Goal: Absence of Hospital-Acquired Illness or Injury  Intervention: Identify and Manage Fall Risk  Recent Flowsheet Documentation  Taken 11/21/2022 0400 by Jana Chu RN  Safety Promotion/Fall Prevention:   bed alarm on   room organization consistent   safety round/check completed   supervised activity   patient and family education   lighting adjusted   increase visualization of patient   fall prevention program maintained   increased rounding and observation   clutter free environment maintained   activity supervised  Taken 11/21/2022 0000 by aJna Chu RN  Safety Promotion/Fall Prevention:   bed alarm on   room organization consistent   safety round/check completed   supervised activity   patient and family education   lighting adjusted   increase visualization of patient   fall prevention program maintained   increased rounding and observation   clutter free environment maintained   activity supervised  Taken 11/20/2022 2000 by Jana Chu RN  Safety Promotion/Fall Prevention:   bed alarm on   room organization consistent   safety round/check completed   supervised activity   patient and family education   lighting adjusted   increase visualization of patient   fall prevention program maintained   increased rounding and observation   clutter free environment maintained   activity supervised  Intervention: Prevent Skin Injury  Recent Flowsheet Documentation  Taken 11/21/2022 0600 by Jana Chu RN  Body Position:   turned   upper extremity elevated   heels elevated  Taken 11/21/2022 0400 by Jana Chu RN  Body Position:   " turned   upper extremity elevated   heels elevated  Taken 11/21/2022 0200 by Jana Chu RN  Body Position:   turned   upper extremity elevated   heels elevated  Taken 11/21/2022 0000 by Jana Chu RN  Body Position:   turned   upper extremity elevated   heels elevated  Taken 11/20/2022 2200 by Jana Chu RN  Body Position:   turned   upper extremity elevated   heels elevated  Taken 11/20/2022 2000 by Jana Chu RN  Body Position:   turned   upper extremity elevated   heels elevated  Intervention: Prevent and Manage VTE (Venous Thromboembolism) Risk  Recent Flowsheet Documentation  Taken 11/21/2022 0400 by Jana Chu RN  VTE Prevention/Management: SCDs (sequential compression devices) on  Taken 11/21/2022 0000 by Jana Chu RN  VTE Prevention/Management: SCDs (sequential compression devices) on  Taken 11/20/2022 2000 by Jana Chu RN  VTE Prevention/Management: SCDs (sequential compression devices) on  Intervention: Prevent Infection  Recent Flowsheet Documentation  Taken 11/21/2022 0400 by Jana Chu RN  Infection Prevention:   equipment surfaces disinfected   hand hygiene promoted   personal protective equipment utilized   rest/sleep promoted   single patient room provided   visitors restricted/screened  Taken 11/21/2022 0000 by Jana Chu RN  Infection Prevention:   equipment surfaces disinfected   hand hygiene promoted   personal protective equipment utilized   rest/sleep promoted   single patient room provided   visitors restricted/screened  Taken 11/20/2022 2000 by Jana Chu RN  Infection Prevention:   equipment surfaces disinfected   hand hygiene promoted   personal protective equipment utilized   rest/sleep promoted   single patient room provided   visitors restricted/screened  Goal: Optimal Comfort and Wellbeing  Intervention: Provide Person-Centered Care  Recent Flowsheet Documentation  Taken 11/21/2022 0400 by Jana Chu  RN  Trust Relationship/Rapport:   care explained   reassurance provided   choices provided  Taken 11/21/2022 0000 by Jana Chu RN  Trust Relationship/Rapport:   care explained   reassurance provided   choices provided  Taken 11/20/2022 2000 by Jana Chu RN  Trust Relationship/Rapport:   care explained   reassurance provided   choices provided   questions answered   questions encouraged   empathic listening provided   thoughts/feelings acknowledged     Problem: Risk for Delirium  Goal: Optimal Coping  Intervention: Optimize Psychosocial Adjustment to Delirium  Recent Flowsheet Documentation  Taken 11/20/2022 2000 by Jana Chu RN  Family/Support System Care:   involvement promoted   presence promoted   self-care encouraged   support provided  Goal: Improved Behavioral Control  Intervention: Minimize Safety Risk  Recent Flowsheet Documentation  Taken 11/21/2022 0400 by Jana hCu RN  Enhanced Safety Measures: bed alarm set  Trust Relationship/Rapport:   care explained   reassurance provided   choices provided  Taken 11/21/2022 0000 by Jana Chu RN  Enhanced Safety Measures: bed alarm set  Trust Relationship/Rapport:   care explained   reassurance provided   choices provided  Taken 11/20/2022 2000 by Jana Chu RN  Enhanced Safety Measures: bed alarm set  Trust Relationship/Rapport:   care explained   reassurance provided   choices provided   questions answered   questions encouraged   empathic listening provided   thoughts/feelings acknowledged     Problem: Restraint, Nonviolent  Goal: Absence of Harm or Injury  Intervention: Protect Dignity, Rights and Personal Wellbeing  Recent Flowsheet Documentation  Taken 11/21/2022 0400 by Jana Chu RN  Trust Relationship/Rapport:   care explained   reassurance provided   choices provided  Taken 11/21/2022 0000 by Jana Chu RN  Trust Relationship/Rapport:   care explained   reassurance provided   choices  provided  Taken 11/20/2022 2000 by Jana Chu RN  Trust Relationship/Rapport:   care explained   reassurance provided   choices provided   questions answered   questions encouraged   empathic listening provided   thoughts/feelings acknowledged  Intervention: Protect Skin and Joint Integrity  Recent Flowsheet Documentation  Taken 11/21/2022 0600 by Jana Chu RN  Body Position:   turned   upper extremity elevated   heels elevated  Taken 11/21/2022 0400 by Jana Chu RN  Body Position:   turned   upper extremity elevated   heels elevated  Taken 11/21/2022 0200 by Jana Chu RN  Body Position:   turned   upper extremity elevated   heels elevated  Taken 11/21/2022 0000 by Jana Chu RN  Body Position:   turned   upper extremity elevated   heels elevated  Taken 11/20/2022 2200 by Jana Chu RN  Body Position:   turned   upper extremity elevated   heels elevated  Taken 11/20/2022 2000 by Jana Chu RN  Body Position:   turned   upper extremity elevated   heels elevated     Problem: Pneumonia  Goal: Resolution of Infection Signs and Symptoms  Intervention: Prevent Infection Progression  Recent Flowsheet Documentation  Taken 11/21/2022 0400 by Jana Chu RN  Isolation Precautions:   contact precautions maintained   droplet precautions maintained  Taken 11/21/2022 0000 by Jana Chu RN  Isolation Precautions:   contact precautions maintained   droplet precautions maintained  Taken 11/20/2022 2000 by Jana Chu RN  Isolation Precautions:   contact precautions maintained   droplet precautions maintained     Problem: CRRT (Continuous Renal Replacement Therapy)  Goal: Absence of Infection Signs and Symptoms  Intervention: Prevent or Manage Infection  Recent Flowsheet Documentation  Taken 11/21/2022 0400 by Jana Chu RN  Infection Prevention:   equipment surfaces disinfected   hand hygiene promoted   personal protective equipment utilized   rest/sleep  promoted   single patient room provided   visitors restricted/screened  Isolation Precautions:   contact precautions maintained   droplet precautions maintained  Taken 11/21/2022 0000 by Jana Chu RN  Infection Prevention:   equipment surfaces disinfected   hand hygiene promoted   personal protective equipment utilized   rest/sleep promoted   single patient room provided   visitors restricted/screened  Isolation Precautions:   contact precautions maintained   droplet precautions maintained  Taken 11/20/2022 2000 by Jana Chu RN  Infection Prevention:   equipment surfaces disinfected   hand hygiene promoted   personal protective equipment utilized   rest/sleep promoted   single patient room provided   visitors restricted/screened  Isolation Precautions:   contact precautions maintained   droplet precautions maintained   Goal Outcome Evaluation:

## 2022-11-21 NOTE — PROGRESS NOTES
Pt intubated by MD due to resp failure.  7.5 ETT secured at 24cm at the lip.  Positive ETCO2 and bilateral LS present.  Pt placed on vent.  Will continue to follow  Cheng Douglass, RT  11/21/2022

## 2022-11-21 NOTE — PROGRESS NOTES
Reviewed MRI brain from 11/17. Multifocal small strokes in non-strategic areas should not affect overall neuro prognosis and would not expect much contribution to encephalopathy.     Maurice Benjamin MD PGY5 Stroke Fellow

## 2022-11-21 NOTE — PROGRESS NOTES
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 Acute respiratory failure - he was extubated yesterday and reintubated today due to acute hypoxemia and likely mucous plugging in the setting of marginal pulmonary hygiene. Continue to titrate down vent support and monitor closely. He is currently on 50% and +8 PEEP.   2. Pneumonia - strep and parainfluenza; on Rocephin    ID- voriconazole for possible fungal infection per ID; aspergillus in sputum.   3. Shock - he remains on levophed at 0.07  4. Acute renal failure - currently continuing on CRRT  5. S/p initial arrest and prolonged resuscitation (11.9) and hypothermia resuscitation   6. History of Liver Transplant 2016 - off tacrolimus but on steroids; Discussed with Liver Tx service and appreciate input- maintain him on at least the equivalent of prednisone 10 mgms/day.   7. History of HTN  8. CHRISTIAN on BIPAP  9. Nutrition enteral   10. CNS- though awake and following commands, long term CNS prognosis is guarded to poor. MRI 11/17 and will discuss with neuro               Summary/Hospital Course:           Assessment and plan :     Blanco Osborne IS a 57 year old male admitted on 11/12/2022 for acute respiratory failure and shock.   I have personally reviewed the daily labs, imaging studies, cultures and discussed the case with referring physician and  consulting physicians.     My assessment and plan by system for this patient is as follows:    Neurology/Psychiatry:   1. S/P arrest and prognosis guarded; call out to neuro-CCM    Cardiovascular:   1.Hemodynamics - still in septic shock on levophed; titrate down as able     Pulmonary/Ventilator Management:   1. Acute resp fail and ARDS due to pneumonia; as above on antibiotics     GI and Nutrition :   1. Enteral     Renal/Fluids/Electrolytes:   1. He continues on CRRT and will remove fluid as able     Infectious Disease:   1. As above; on antibiotics; discussed with Dr. Hernández.     Endocrine:   1. Glucoses ok on coverage     Hematology/Oncology:   1. Monitor only      IV/Access:   1. Venous access -   2. Arterial access -   3.  Plan  - central access required and necessary      ICU Prophylaxis:   1. DVT: Hep Subq/ LMWH/mechanical  2. VAP: HOB 30 degrees, chlorhexidine rinse  3. Stress Ulcer: PPI/H2 blocker  4. Restraints: Nonviolent soft two point restraints required and necessary for patient safety and continued cares and good effect as patient continues to pull at necessary lines, tubes despite education and distraction. Will readdress daily.   5. IV Access - central access required and necessary for continued patient cares  6. Feeding - enteral nutrition   7. Family Update: discussed with brother and sister at bedside after re-intubation.   8. Disposition - ICU care         Key goals for next 24 hours:   1. Vent support   2.  3.               Interim History:              Key Medications:       albuterol  2.5 mg Nebulization Q6H     artificial tears  1 drop Both Eyes TID     cefTRIAXone  2 g Intravenous Q24H     methylPREDNISolone  62.5 mg Intravenous Q24H     multivitamins w/minerals  15 mL Per Feeding Tube Daily     pantoprazole  40 mg Per Feeding Tube QAM AC    Or     pantoprazole  40 mg Intravenous QAM AC     polyethylene glycol  17 g Oral Daily     senna-docusate  1 tablet Oral BID    Or     senna-docusate  2  tablet Oral BID     voriconazole  4 mg/kg Intravenous BID       amiodarone 0.5 mg/min (11/21/22 1136)     dextrose       CRRT replacement solution 5,000 mL (11/19/22 1550)     fentaNYL 25 mcg/hr (11/21/22 0939)     heparin 500 Units/hr (11/21/22 0725)     insulin regular 1.5 Units/hr (11/21/22 0725)     - MEDICATION INSTRUCTIONS -       - MEDICATION INSTRUCTIONS -       norepinephrine 0.07 mcg/kg/min (11/21/22 1025)     phenylephrine       CRRT replacement solution 200 mL/hr at 11/21/22 0721     CRRT replacement solution 0 mL/kg/hr (11/14/22 1030)     propofol 10 mcg/kg/min (11/21/22 0908)     sodium chloride Stopped (11/21/22 0726)     sodium chloride 20 mL/hr at 11/21/22 0726     vasopressin                 Physical Examination:   Temp:  [96.2  F (35.7  C)-97.5  F (36.4  C)] 97.3  F (36.3  C)  Pulse:  [] 117  Resp:  [8-28] 22  BP: ()/(63-97) 110/97  MAP:  [43 mmHg-266 mmHg] 70 mmHg  Arterial Line BP: ()/() 90/60  FiO2 (%):  [3 %] 3 %  SpO2:  [88 %-100 %] 99 %    Intake/Output Summary (Last 24 hours) at 11/21/2022 1201  Last data filed at 11/21/2022 1100  Gross per 24 hour   Intake 966.02 ml   Output 2216 ml   Net -1249.98 ml     Wt Readings from Last 4 Encounters:   11/21/22 102.2 kg (225 lb 5 oz)   10/07/19 137.5 kg (303 lb 3.2 oz)   10/04/19 131.5 kg (290 lb)   02/20/19 140.4 kg (309 lb 9.6 oz)     Arterial Line BP: ()/() 90/60  MAP:  [43 mmHg-266 mmHg] 70 mmHg  BP - Mean:  [] 104  Vent Mode: CMV/AC  (Continuous Mandatory Ventilation/ Assist Control)  FiO2 (%): 3 %  Resp Rate (Set): 22 breaths/min  Tidal Volume (Set, mL): 500 mL  PEEP (cm H2O): 8 cmH2O  Resp: 22    Recent Labs   Lab 11/21/22  0824 11/20/22  0746 11/19/22  0554 11/18/22  0411   PH 7.06* 7.45 7.42 7.39   PCO2 96* 36 38 38   PO2 66* 71* 154* 74*   HCO3 27 25 25 23   O2PER 40 35 35 50       GEN: no acute distress; now comfortable again on vent   HEENT: head ncat, sclera anicteric, OP patent, trachea midline    PULM: unlabored synchronous with vent, clear anteriorly; decreased breath sounds on left    CV/COR: RRR S1S2 no gallop,  No rub, no murmur  ABD: soft nontender,  EXT:  Mod edema   warm  NEURO: reportedly follows some commands when sedation light   SKIN: no obvious rash; no cyanosis or mottling   LINES: clean, dry intact         Data:   All data and imaging reviewed     ROUTINE ICU LABS (Last four results)  CMP  Recent Labs   Lab 11/21/22  1149 11/21/22  1054 11/21/22  0911 11/21/22  0607 11/21/22  0401 11/20/22  1949 11/20/22  1945 11/20/22  1347 11/20/22  1151 11/20/22  0331 11/20/22  0326 11/19/22  0452 11/19/22  0447 11/18/22  0935 11/18/22  0410   NA  --   --   --   --  137  --  134  --  136  --  137   < > 138   < > 135   POTASSIUM  --   --   --   --  4.6  --  4.7  --  4.2  --  4.0   < > 3.8   < > 3.7   CHLORIDE  --   --   --   --  107  --  105  --  106  --  106   < > 106   < > 104   CO2  --   --   --   --  27  --  26  --  24  --  24   < > 22   < > 25   ANIONGAP  --   --   --   --  3  --  3  --  6  --  7   < > 10   < > 6   * 100* 79 105* 116*   < > 158*   < > 188*   < > 172*   < > 208*   < > 194*   BUN  --   --   --   --  23  --  25  --  29  --  28   < > 34*   < > 34*   CR  --   --   --   --  0.78  --  0.78  --  0.84  --  0.76   < > 1.00   < > 1.09   GFRESTIMATED  --   --   --   --  >90  --  >90  --  >90  --  >90   < > 87   < > 79   KELLY  --   --   --   --  8.2*  --  8.3*  --  7.9*  --  8.0*   < > 7.7*   < > 8.4*   MAG  --   --   --   --  2.3  --  2.4*  --  2.1  --  2.2   < > 2.1   < > 2.4*   PHOS  --   --   --   --  4.8*  --  4.6*  --  2.0*  --  2.0*   < > 2.1*   < > 3.4   PROTTOTAL  --   --   --   --  6.2*  --   --   --   --   --  5.5*  --  5.2*  --  5.9*   ALBUMIN  --   --   --   --  2.1*  --  2.2*  --  1.9*  --  1.8*   < > 1.8*   < > 1.9*   BILITOTAL  --   --   --   --  1.4*  --   --   --   --   --  1.4*  --  1.5*  --  2.1*   ALKPHOS  --   --   --   --  209*  --   --   --   --   --  217*  --  182*   --  168*   AST  --   --   --   --  36  --   --   --   --   --  33  --  30  --  35   ALT  --   --   --   --  42  --   --   --   --   --  38  --  32  --  35    < > = values in this interval not displayed.     CBC  Recent Labs   Lab 11/21/22  0401 11/20/22  1945 11/20/22  1151 11/20/22  0326   WBC 41.0* 44.5* 28.9* 29.8*   RBC 2.96* 3.20* 2.82* 2.79*   HGB 10.0* 10.7* 9.3* 9.3*   HCT 31.3* 33.9* 28.5* 28.3*   * 106* 101* 101*   MCH 33.8* 33.4* 33.0 33.3*   MCHC 31.9 31.6 32.6 32.9   RDW 17.5* 16.9* 15.2* 15.1*   * 117* 77* 65*     INRNo lab results found in last 7 days.  Arterial Blood Gas  Recent Labs   Lab 11/21/22  0824 11/20/22  0746 11/19/22  0554 11/18/22  0411   PH 7.06* 7.45 7.42 7.39   PCO2 96* 36 38 38   PO2 66* 71* 154* 74*   HCO3 27 25 25 23   O2PER 40 35 35 50       All cultures:  No results for input(s): CULT in the last 168 hours.  Recent Results (from the past 24 hour(s))   XR Chest Port 1 View    Narrative    CHEST ONE VIEW  11/21/2022 9:06 AM     HISTORY: Desaturation      COMPARISON: November 20, 2022      Impression    IMPRESSION: Endotracheal tube tip approximately 5.1 cm from the  og. Pleural catheter in place on the right. Small apical  pneumothorax medially. Subcutaneous emphysema again evident. Increased  groundglass infiltrates in mid and upper left lung compared to  previous. Similar bibasilar infiltrates.    HANS WESTON MD         SYSTEM ID:  [id number]   XR Abdomen Port 1 View    Narrative    ABDOMEN ONE VIEW PORTABLE  11/21/2022 9:08 AM     HISTORY: OG placement    COMPARISON: November 13, 2022       Impression    IMPRESSION: Orogastric tube tip projects within the stomach bubble,  minimally within the stomach.    HANS WESTON MD         SYSTEM ID:  [id number]       MD Jessica    Billing: This patient is critically ill: Yes. Total critical care time today 45 min.

## 2022-11-21 NOTE — PLAN OF CARE
Pt. On ventilator and tolerated pressure support trial, extubated at 1235 and tolerated nasal cannula, coarse upper airway sounds and weak cough (pt. Unable to bring up a lot of secretions), following commands and trying to communicate with some words (voice too low with whisper after extubating to make out anything), heating blanket used for colder temp with pt. Responding up with temp afterward, answers yes or no questions well and follow commands, extremities moving equally, tolerating CRRT, circuit change done with blood returned to pt. After error regarding too much fluid removal from pt., circuit subsequently worked well and pt. Tolerating fluid reomoval goals, mayelin synephrine needed briefly with BP noted to be more generous after extubation, no urine output, bladder scanned for small volme of urine, tow loose dark brown stools, skin wounds to lip and buttocks remain unchanged, pt's brother and sister-in-law at bedside and comforted pt. And were supportive.

## 2022-11-21 NOTE — PROGRESS NOTES
[facility] ICU RESPIRATORY NOTE        Date of Admission: 11/12/2022    Date of Intubation (most recent):  11/21/22    Reason for Mechanical Ventilation: Resp failure    Number of Days on Mechanical Ventilation: 1    Met Criteria for Spontaneous Breathing Trial: No    Reason for No Spontaneous Breathing Trial: Per MD    Significant Events Today: Pt was intubated by MD.  See intubation note    ABG Results:   Recent Labs   Lab 11/21/22  1600 11/21/22  1152 11/21/22  0824 11/20/22  0746   PH 7.43 7.43 7.06* 7.45   PCO2 36 36 96* 36   PO2 70* 83 66* 71*   HCO3 24 23 27 25   O2PER 50 50 40 35       Current Vent Settings: Vent Mode: CMV/AC  (Continuous Mandatory Ventilation/ Assist Control)  FiO2 (%): (S) 60 %  Resp Rate (Set): 22 breaths/min  Tidal Volume (Set, mL): 500 mL  PEEP (cm H2O): 10 cmH2O  Resp: 22      Skin Assessment:  Pt has scabs on upper lip    Plan: Pt to remain on full vent support overnight    Cheng Douglass, RT on 11/21/2022 at 5:43 PM

## 2022-11-21 NOTE — PLAN OF CARE
"Shift Summary: 0700-1930- pt re-intubated at 0830.       Neuro: SOTO to command, PERRL. Prior to intub, nodded head \"yes\" appropriately when given options for place. This afternoon, pt appearing frustrated, not wanting pain meds or sedation but continuously coughing/desating. RASS -1/-2 for vent tolerance.  CV: Initially NSR, following intubation converted to a. Fib 90-110s. Converted back to SB/SR at 1300, levophed gtt switched to phenylephrine gtt. Now requiring 1.4 mcg/kg/min.   Resp: On arrival to shift, pt lethargic, with slightly increased WOB. Following turn onto L side, desated to 50s, shallow breaths, LS dimmer on L. MDs to bedside and pt re-intub. 50%/+8 from 4763-5614. At 1600, pt desated to 70s-80s without any other changes, pO2 70 on 50%. Received order for cxray, and to increase PEEP to 10. After PEEP increase, sats instantly improved. Thick moderate ETT secretions. R apical CT to waterseal with 160ml serous output  GI: Small BMx2. TF restarted at 1700 with plan to increase by 20ml q4hrs to 80(goal). 60q4hr [facility]WF  : anuric. CRRT achieving goal of 100-200ml/hr net removal since 1400. Following intubation, BP labile, levophed started and fluid removal rate decreased to 0, gradually increased back to 100ml/hr at 1100  Inf/Endo: temp 97.4, josseline hugger on. Insulin gtt stopped d/t bgl 79, sliding scale insulin ordered  Lines/Gtts: RIJ HD, R fem Leela and CVC, PIVx3. Infusing phenylepherine, Amiodarone, propofol, fentanyl  Family: pts spouse Ofe, sister Lucie, brother Dylan at bedside. Appropriate and supportive, updated by myself and MDs    Jyotsna Plasencia RN on 11/21/2022 at 6:40 PM                 "

## 2022-11-21 NOTE — PROGRESS NOTES
[facility]    Infectious Disease Progress Note    Date of Service : 11/21/2022     Assessment:  57 YM with hx of liver transplantation who is hopsitalzied with out of hospital PEA cardiac arrest with 20 min CPR and found to have pneumococcal pneumonia with ARDS and sepsis superimposed on  Parainfluenza pneumonia. He is in septic shock with multiorgan failure with acute hypoxic respiratory failure requiring mechanical ventilation, LORETTA requiring CRRT and is hypothermic. Course has been complicated by barotrauma with right pneumothorax requiring chest tube No clinical meningitis.    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 CT abdomen/pelvis 11/12 did not show intra abdominal pathology  -S/p liver transplantation for NAFLD related cirrhosis. On Tacrolimus  -LORETTA on CRRT  -Thrombocytopenia related to sepsis improving  -anemia     Recommendations  1. Continue Ceftriaxone 2 grams daily -plan treatment for 2 weeks for bacteremia/sepsis until 11/27  2.  Plan transition to oral Voriconazole 4mg/kg Q12H by tomorrow  3.  Follow clinical status and labs  May need to consider repeating CT chest abdomen pelvis if leukocytosis persists/worsens to exclude additional focus of infection  Discussed with the ICU team    Tori Hernández MD    Interval History   Events noted, was extubated over yesterday and was following commands, but re intubated this morning.  Remains on dialysis, hypothermic with respiratory failure, marked leukocytosis.   Sputum cx growing Aspergillus terreus of unclear significance    Physical Exam   Temp: 97.3  F (36.3  C) (josseline hugger applied) Temp src: Temporal BP: (!) 110/97 Pulse: 117   Resp: 22 SpO2: 99 % O2 Device: Mechanical Ventilator Oxygen Delivery: 3 LPM  Vitals:    11/19/22 0530 11/20/22 0600 11/21/22 0600   Weight: 101.3 kg (223 lb 5.2 oz) 102.4 kg (225 lb 12 oz) 102.2 kg (225 lb 5 oz)     Vital Signs with Ranges  Temp:  [96.2  F (35.7  C)-97.5  F (36.4  C)] 97.3  F (36.3  C)  Pulse:  [] 117  Resp:  [8-28] 22  BP: ()/(63-97) 110/97  MAP:  [43 mmHg-266 mmHg] 70 mmHg  Arterial Line BP: ()/() 90/60  FiO2 (%):  [3 %] 3 %  SpO2:  [88 %-100 %] 99 %    Constitutional: intubated  Lungs: anterior auscultation clear, right sided chest tube  Cardiovascular: S1S2  Abdomen: soft  Skin: No rash    Other:    Medications     amiodarone 0.5 mg/min (11/21/22 1136)     dextrose       CRRT replacement solution 5,000 mL (11/19/22 1550)     fentaNYL 25 mcg/hr (11/21/22 0939)     heparin 500 Units/hr (11/21/22 0725)     insulin regular 1.5 Units/hr (11/21/22 0725)     - MEDICATION INSTRUCTIONS -       - MEDICATION INSTRUCTIONS -       norepinephrine 0.07 mcg/kg/min (11/21/22 1025)     phenylephrine       CRRT replacement solution 200 mL/hr at 11/21/22 0721     CRRT replacement solution 0 mL/kg/hr (11/14/22 1030)     propofol 10 mcg/kg/min (11/21/22 0908)     sodium chloride Stopped (11/21/22 0726)     sodium chloride 20 mL/hr at 11/21/22 0726     vasopressin         albuterol  2.5 mg Nebulization Q6H     artificial tears  1 drop Both Eyes TID     cefTRIAXone  2 g Intravenous Q24H     methylPREDNISolone  62.5 mg Intravenous Q24H     multivitamins w/minerals  15 mL Per Feeding Tube Daily     pantoprazole  40 mg Per Feeding Tube QAM AC    Or     pantoprazole  40 mg Intravenous QAM AC     polyethylene glycol  17 g Oral Daily     senna-docusate  1 tablet Oral BID    Or     senna-docusate  2 tablet Oral BID     voriconazole   4 mg/kg Intravenous BID       Data   All microbiology laboratory data reviewed.  Recent Labs   Lab Test 11/21/22  0401 11/20/22 1945 11/20/22  1151   WBC 41.0* 44.5* 28.9*   HGB 10.0* 10.7* 9.3*   HCT 31.3* 33.9* 28.5*   * 106* 101*   * 117* 77*     Recent Labs   Lab Test 11/21/22  0401 11/20/22 1945 11/20/22  1151   CR 0.78 0.78 0.84     Microbiology  11/12 blood cx  Peripheral Blood    0 Result Notes  Culture Positive on the 1st day of incubation Abnormal         Streptococcus pneumoniae Panic     2 of 2 bottles  Sent to [place] [facility] for serotyping, report to follow.         Resulting Agency: [facility]      Susceptibility                   Streptococcus pneumoniae       KARAN       Ceftriaxone (meningitis) 0.016 ug/mL Susceptible       Ceftriaxone (non-meningitis) 0.016 ug/mL Susceptible       Levofloxacin 1.5 ug/mL Susceptible       Meropenem 0.012 ug/mL Susceptible       Penicillin (meningitis) 0.016 ug/mL Susceptible       Penicillin (non-meningitis) 0.016 ug/mL Susceptible       Penicillin(oral) 0.016 ug/mL Susceptible       Vancomycin 0.50 ug/mL Susceptible                       Imaging  11/17 MRI brain  EXAM: MR BRAIN W/O and W CONTRAST  LOCATION: [facility]  DATE/TIME: 11/17/2022 6:58 PM     INDICATION: Evaluate for anoxic brain injury following cardiac arrest.  COMPARISON: Head CT 11/12/2022 and 11/16/2022.  CONTRAST: 8.5 mL Gadavist  TECHNIQUE: Routine multiplanar multisequence head MRI without and with intravenous contrast.     FINDINGS:  INTRACRANIAL CONTENTS: Multifocal areas of restricted diffusion are identified intracranially with cortical/subcortical involvement of the paramedian posterior right parietal level, deep white matter posterior right frontal corona radiata, anterior   subcortical white matter left frontal lobe, and left periventricular temporooccipital junction deep white matter, with restricted diffusion, corresponding low ADC map  value and fairly concordant FLAIR hyperintense signal abnormality. No evidence for   hemorrhagic transformation. These areas of ischemic involvement are better defined than previous CT due to modality/diffusion-weighted imaging. Multifocal distribution suggests possible central thromboembolic etiology but is nonspecific. There is no mass   effect, sulcal effacement or ventricular effacement present. No additional restricted diffusion abnormality. Otherwise parenchymal signal is satisfactory for age. Mild to moderate generalized cerebral atrophy. No hydrocephalus. Normal position of the   cerebellar tonsils. Postcontrast imaging shows no corresponding enhancement abnormality of the areas of restricted diffusion. No pathologic enhancement is present. No abnormal intraparenchymal leptomeningeal or dural enhancement. Dural venous sinus   enhancement is satisfactory. Meckel's cave and cavernous sinus enhancement is normal. No pathologic orbital enhancement.     SELLA: No abnormality accounting for technique.     OSSEOUS STRUCTURES/SOFT TISSUES: No evidence for marrow infiltrative process. No diploic space, skull base/clivus or upper cervical marrow signal abnormality is noted. Patient is nasally and orally intubated. The major intracranial vascular flow voids   are maintained.      ORBITS: No abnormality accounting for technique. No pathologic orbital enhancement.     SINUSES/MASTOIDS: Air-fluid levels in the maxillary sinuses with partial opacification of the ethmoid air cells and sphenoid sinus. Fluid opacification in the nasopharynx related to patient's intubated status presumed. Complete/near complete   opacification of the mastoid air cells bilaterally. No apparent mass in the posterior nasopharynx or skull base.                                                                       IMPRESSION:  1.  Multifocal punctate and confluent areas of nonhemorrhagic acute to subacute ischemic change with concordant FLAIR  hyperintensity as above. No corresponding enhancement abnormality. No mass effect, sulcal effacement or midline shift. No ventricular   effacement     2.  Cortical/subcortical involvement is identified right parietal level with punctate involvement noted elsewhere deep white matter posterior right frontal level, left frontal subcortical white matter and left periventricular temporal level.     3.  No pathologic enhancement is noted intracranially     4.  Satisfactory ventricular caliber     5.  Opacification mastoid air cells bilaterally. Air-fluid level paranasal sinuses. Patient is nasally and orally intubated on today's examination.

## 2022-11-21 NOTE — PLAN OF CARE
Goal Outcome Evaluation:      Plan of Care Reviewed With:  (Interdisciplinary bedside rounds)    Overall Patient Progress: no changeOverall Patient Progress: no change    Outcome Evaluation: TF resumption orders written -- see RD note from today for details.    Martha Sandoval RD, LD, CNSC   Clinical Dietitian - [facility]

## 2022-11-21 NOTE — PROGRESS NOTES
Renal Medicine Progress Note            Assessment/Plan:     Blanco Osborne is a 57-year-old male with PMH CARRILLO s/p liver transplant (9/2016) and cirrhosis admitted 11/12/2022 with out of hospital PEA arrest and acute hypoxemic respiratory failure.  Course complicated by parainfluenza virus, streptococcal bacteremia, and LORETTA requiring CRRT.      # Severe anuric LORETTA:    -CRRT    # Septic shock with MODS: Improved.    -NE at 0.07 mcg    # Respiratory failure: Re-intubated this morning. CXR personally reviewed.   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      CRRT order:   Qb 200 ml/hr  Prefilter: Phox 4k at 2300 ml/hr  Dialyate: PrimaSol 2K at 1000 ml/hr  Post: PrimaSol 2K, 200 mg/hr  FF 26%  Dose 35 ml/kg/hr    Increase UF to neg 100-200 ml/hr as tolerate. Decrease Prefilter to 1000 ml/hr Likely can stop CRRT tomorrow and switched to IHD. Will evaluate in AM.     I discussed the plan with Dr. Gaines in person.         Interval History:     Afebrile.   Tachy. On small dose of NE.  Re-intubated this morning.   CRRT parameters reviewed with RN.           Medications and Allergies:       albuterol  2.5 mg Nebulization Q6H     artificial tears  1 drop Both Eyes TID     cefTRIAXone  2 g Intravenous Q24H     methylPREDNISolone  62.5 mg Intravenous Q24H     multivitamins w/minerals  15 mL Per Feeding Tube Daily     pantoprazole  40 mg Per Feeding Tube QAM AC    Or     pantoprazole  40 mg Intravenous QAM AC     polyethylene glycol  17 g Oral Daily     senna-docusate  1 tablet Oral BID    Or     senna-docusate  2 tablet Oral BID     voriconazole  4 mg/kg Intravenous BID      No Active Allergies         Physical Exam:   Vitals were reviewed   , Blood pressure (!) 110/97, pulse 117, temperature 97.3  F (36.3  C), resp. rate 22, height 1.829 m (6'), weight 102.2 kg (225 lb 5 oz), SpO2 100  %.    Wt Readings from Last 3 Encounters:   11/21/22 102.2 kg (225 lb 5 oz)   10/07/19 137.5 kg (303 lb 3.2 oz)   10/04/19 131.5 kg (290 lb)       Intake/Output Summary (Last 24 hours) at 11/21/2022 1218  Last data filed at 11/21/2022 1100  Gross per 24 hour   Intake 966.02 ml   Output 2216 ml   Net -1249.98 ml       GENERAL APPEARANCE: Critically ill  HEENT:  Intubated. Eyes open.  RESP: Somewhat coarse.  CV: RRR, nl S1/S2, tachy  ABDOMEN: distended, soft.   EXTREMITIES/SKIN: 2+  edema  NEURO: Open eyes.  RIJ temp cvc          Data:     CBC RESULTS:     Recent Labs   Lab 11/21/22  1150 11/21/22  0401 11/20/22  1945 11/20/22  1151 11/20/22  0326 11/19/22 2007   WBC 52.4* 41.0* 44.5* 28.9* 29.8* 51.8*   RBC 3.01* 2.96* 3.20* 2.82* 2.79* 2.98*   HGB 10.0* 10.0* 10.7* 9.3* 9.3* 9.9*   HCT 32.4* 31.3* 33.9* 28.5* 28.3* 29.9*    130* 117* 77* 65* 98*       Basic Metabolic Panel:  Recent Labs   Lab 11/21/22  1150 11/21/22  1149 11/21/22  1054 11/21/22  0911 11/21/22  0607 11/21/22  0401 11/20/22 1949 11/20/22  1945 11/20/22  1347 11/20/22  1151 11/20/22  0331 11/20/22  0326 11/19/22 2011 11/19/22 2007     --   --   --   --  137  --  134  --  136  --  137  --  135   POTASSIUM 4.5  --   --   --   --  4.6  --  4.7  --  4.2  --  4.0  --  4.3   CHLORIDE 106  --   --   --   --  107  --  105  --  106  --  106  --  106   CO2 21  --   --   --   --  27  --  26  --  24  --  24  --  23   BUN 24  --   --   --   --  23  --  25  --  29  --  28  --  30   CR 0.76  --   --   --   --  0.78  --  0.78  --  0.84  --  0.76  --  0.87   * 111* 100* 79 105* 116*   < > 158*   < > 188*   < > 172*   < > 164*   KELLY 8.4*  --   --   --   --  8.2*  --  8.3*  --  7.9*  --  8.0*  --  8.1*    < > = values in this interval not displayed.       INRNo lab results found in last 7 days.   Attestation:   I have reviewed today's relevant vital signs, notes, medications, labs and imaging.    Zenon Bradshaw MD  [facility] -  Nephrology  Office phone :[phone]  Pager: [phone]

## 2022-11-21 NOTE — PROCEDURES
[facility]  Procedure Note           Intubation:       Blanco Osborne  MRN# [id number]   November 21, 2022, 8:35 AM Indication: Respiratory failure  Inability to protect airway  Change in level of conciousness  Respiratory distress           Patient intubated at: November 21, 2022, 8:35 AM   Emergency situation.: Family informed after intubation completed   Informed consent: Emergency, informed consent   Cervical spine: Was not stabilized during the procedure   Sedative medication: Was administered during the procedure   Technique used: Fiberoptic visualization with GlideScope   Endotracheal tube size: 7.5 cm with cuff   Number of attempts: 1   Placement confirmed by: Auscultation of bilateral breath sounds  Visualization of bilateral chest wall rise  End-tidal CO2 monitor  Chest X-ray   ET tube repositioning: Was not performed   Tube secured at: 23 cm      This procedure was performed without difficulty and he tolerated the procedure well with no complications.      Recorded by MD Dr. Karen Minor was present for the entire duration of the procedure. Anesthesia on backup for procedure and available in the room.

## 2022-11-22 LAB
ALBUMIN SERPL-MCNC: 1.9 G/DL (ref 3.4–5)
ALBUMIN SERPL-MCNC: 2 G/DL (ref 3.4–5)
ALP SERPL-CCNC: 198 U/L (ref 40–150)
ALT SERPL W P-5'-P-CCNC: 40 U/L (ref 0–70)
ANION GAP SERPL CALCULATED.3IONS-SCNC: 5 MMOL/L (ref 3–14)
ANION GAP SERPL CALCULATED.3IONS-SCNC: 7 MMOL/L (ref 3–14)
AST SERPL W P-5'-P-CCNC: 40 U/L (ref 0–45)
ATRIAL RATE - MUSE: 159 BPM
BASE EXCESS BLDA CALC-SCNC: -0.1 MMOL/L (ref -9–1.8)
BASE EXCESS BLDA CALC-SCNC: 0 MMOL/L (ref -9–1.8)
BASE EXCESS BLDA CALC-SCNC: 0.4 MMOL/L (ref -9–1.8)
BASE EXCESS BLDA CALC-SCNC: 0.5 MMOL/L (ref -9–1.8)
BASE EXCESS BLDA CALC-SCNC: 1.1 MMOL/L (ref -9–1.8)
BASOPHILS # BLD MANUAL: 0 10E3/UL (ref 0–0.2)
BASOPHILS NFR BLD MANUAL: 0 %
BILIRUB DIRECT SERPL-MCNC: 1.2 MG/DL (ref 0–0.2)
BILIRUB SERPL-MCNC: 1.5 MG/DL (ref 0.2–1.3)
BUN SERPL-MCNC: 30 MG/DL (ref 7–30)
BUN SERPL-MCNC: 32 MG/DL (ref 7–30)
CA-I BLD-MCNC: 4.4 MG/DL (ref 4.4–5.2)
CA-I BLD-MCNC: 4.6 MG/DL (ref 4.4–5.2)
CALCIUM SERPL-MCNC: 7.9 MG/DL (ref 8.5–10.1)
CALCIUM SERPL-MCNC: 7.9 MG/DL (ref 8.5–10.1)
CHLORIDE BLD-SCNC: 107 MMOL/L (ref 94–109)
CHLORIDE BLD-SCNC: 107 MMOL/L (ref 94–109)
CO2 SERPL-SCNC: 21 MMOL/L (ref 20–32)
CO2 SERPL-SCNC: 25 MMOL/L (ref 20–32)
CREAT SERPL-MCNC: 0.94 MG/DL (ref 0.66–1.25)
CREAT SERPL-MCNC: 0.95 MG/DL (ref 0.66–1.25)
CRP SERPL-MCNC: 22.3 MG/L (ref 0–8)
DIASTOLIC BLOOD PRESSURE - MUSE: NORMAL MMHG
EOSINOPHIL # BLD MANUAL: 0 10E3/UL (ref 0–0.7)
EOSINOPHIL NFR BLD MANUAL: 0 %
ERYTHROCYTE [DISTWIDTH] IN BLOOD BY AUTOMATED COUNT: 18.3 % (ref 10–15)
ERYTHROCYTE [DISTWIDTH] IN BLOOD BY AUTOMATED COUNT: 18.5 % (ref 10–15)
ERYTHROCYTE [DISTWIDTH] IN BLOOD BY AUTOMATED COUNT: 18.6 % (ref 10–15)
GFR SERPL CREATININE-BSD FRML MDRD: >90 ML/MIN/1.73M2
GFR SERPL CREATININE-BSD FRML MDRD: >90 ML/MIN/1.73M2
GLUCOSE BLD-MCNC: 168 MG/DL (ref 70–99)
GLUCOSE BLD-MCNC: 185 MG/DL (ref 70–99)
GLUCOSE BLDC GLUCOMTR-MCNC: 131 MG/DL (ref 70–99)
GLUCOSE BLDC GLUCOMTR-MCNC: 141 MG/DL (ref 70–99)
GLUCOSE BLDC GLUCOMTR-MCNC: 153 MG/DL (ref 70–99)
GLUCOSE BLDC GLUCOMTR-MCNC: 168 MG/DL (ref 70–99)
GLUCOSE BLDC GLUCOMTR-MCNC: 189 MG/DL (ref 70–99)
GLUCOSE BLDC GLUCOMTR-MCNC: 199 MG/DL (ref 70–99)
HCO3 BLD-SCNC: 24 MMOL/L (ref 21–28)
HCT VFR BLD AUTO: 29.9 % (ref 40–53)
HCT VFR BLD AUTO: 31.6 % (ref 40–53)
HCT VFR BLD AUTO: 32.6 % (ref 40–53)
HGB BLD-MCNC: 10.2 G/DL (ref 13.3–17.7)
HGB BLD-MCNC: 10.5 G/DL (ref 13.3–17.7)
HGB BLD-MCNC: 9.6 G/DL (ref 13.3–17.7)
INTERPRETATION ECG - MUSE: NORMAL
LYMPHOCYTES # BLD MANUAL: 3.3 10E3/UL (ref 0.8–5.3)
LYMPHOCYTES NFR BLD MANUAL: 7 %
MAGNESIUM SERPL-MCNC: 2.3 MG/DL (ref 1.6–2.3)
MAGNESIUM SERPL-MCNC: 2.3 MG/DL (ref 1.6–2.3)
MCH RBC QN AUTO: 33.6 PG (ref 26.5–33)
MCH RBC QN AUTO: 33.8 PG (ref 26.5–33)
MCH RBC QN AUTO: 34.3 PG (ref 26.5–33)
MCHC RBC AUTO-ENTMCNC: 32.1 G/DL (ref 31.5–36.5)
MCHC RBC AUTO-ENTMCNC: 32.2 G/DL (ref 31.5–36.5)
MCHC RBC AUTO-ENTMCNC: 32.3 G/DL (ref 31.5–36.5)
MCV RBC AUTO: 104 FL (ref 78–100)
MCV RBC AUTO: 105 FL (ref 78–100)
MCV RBC AUTO: 107 FL (ref 78–100)
METAMYELOCYTES # BLD MANUAL: 0.9 10E3/UL
METAMYELOCYTES NFR BLD MANUAL: 2 %
MONOCYTES # BLD MANUAL: 3.8 10E3/UL (ref 0–1.3)
MONOCYTES NFR BLD MANUAL: 8 %
NEUTROPHILS # BLD MANUAL: 39 10E3/UL (ref 1.6–8.3)
NEUTROPHILS NFR BLD MANUAL: 83 %
NRBC # BLD AUTO: 1.9 10E3/UL
NRBC BLD MANUAL-RTO: 4 %
O2/TOTAL GAS SETTING VFR VENT: 40 %
O2/TOTAL GAS SETTING VFR VENT: 40 %
O2/TOTAL GAS SETTING VFR VENT: 55 %
OXYHGB MFR BLD: 99 % (ref 92–100)
P AXIS - MUSE: NORMAL DEGREES
PCO2 BLD: 32 MM HG (ref 35–45)
PCO2 BLD: 33 MM HG (ref 35–45)
PCO2 BLD: 33 MM HG (ref 35–45)
PCO2 BLD: 34 MM HG (ref 35–45)
PCO2 BLD: 36 MM HG (ref 35–45)
PH BLD: 7.43 [PH] (ref 7.35–7.45)
PH BLD: 7.46 [PH] (ref 7.35–7.45)
PH BLD: 7.46 [PH] (ref 7.35–7.45)
PH BLD: 7.47 [PH] (ref 7.35–7.45)
PH BLD: 7.49 [PH] (ref 7.35–7.45)
PHOSPHATE SERPL-MCNC: 3.8 MG/DL (ref 2.5–4.5)
PHOSPHATE SERPL-MCNC: 3.9 MG/DL (ref 2.5–4.5)
PLAT MORPH BLD: ABNORMAL
PLATELET # BLD AUTO: 190 10E3/UL (ref 150–450)
PLATELET # BLD AUTO: 191 10E3/UL (ref 150–450)
PLATELET # BLD AUTO: 229 10E3/UL (ref 150–450)
PO2 BLD: 104 MM HG (ref 80–105)
PO2 BLD: 136 MM HG (ref 80–105)
PO2 BLD: 140 MM HG (ref 80–105)
PO2 BLD: 151 MM HG (ref 80–105)
PO2 BLD: 90 MM HG (ref 80–105)
POTASSIUM BLD-SCNC: 4.4 MMOL/L (ref 3.4–5.3)
POTASSIUM BLD-SCNC: 4.4 MMOL/L (ref 3.4–5.3)
PR INTERVAL - MUSE: NORMAL MS
PROT SERPL-MCNC: 5.5 G/DL (ref 6.8–8.8)
QRS DURATION - MUSE: 82 MS
QT - MUSE: 300 MS
QTC - MUSE: 454 MS
R AXIS - MUSE: 63 DEGREES
RBC # BLD AUTO: 2.84 10E6/UL (ref 4.4–5.9)
RBC # BLD AUTO: 3.04 10E6/UL (ref 4.4–5.9)
RBC # BLD AUTO: 3.06 10E6/UL (ref 4.4–5.9)
RBC MORPH BLD: ABNORMAL
SODIUM SERPL-SCNC: 135 MMOL/L (ref 133–144)
SODIUM SERPL-SCNC: 137 MMOL/L (ref 133–144)
SYSTOLIC BLOOD PRESSURE - MUSE: NORMAL MMHG
T AXIS - MUSE: -85 DEGREES
UFH PPP CHRO-ACNC: <0.1 IU/ML
VENTRICULAR RATE- MUSE: 138 BPM
WBC # BLD AUTO: 37.9 10E3/UL (ref 4–11)
WBC # BLD AUTO: 42.8 10E3/UL (ref 4–11)
WBC # BLD AUTO: 47 10E3/UL (ref 4–11)

## 2022-11-22 PROCEDURE — 250N000009 HC RX 250: Performed by: INTERNAL MEDICINE

## 2022-11-22 PROCEDURE — 86140 C-REACTIVE PROTEIN: CPT | Performed by: SPECIALIST

## 2022-11-22 PROCEDURE — 258N000003 HC RX IP 258 OP 636: Performed by: INTERNAL MEDICINE

## 2022-11-22 PROCEDURE — 94668 MNPJ CHEST WALL SBSQ: CPT

## 2022-11-22 PROCEDURE — 99291 CRITICAL CARE FIRST HOUR: CPT | Performed by: INTERNAL MEDICINE

## 2022-11-22 PROCEDURE — 85027 COMPLETE CBC AUTOMATED: CPT | Performed by: STUDENT IN AN ORGANIZED HEALTH CARE EDUCATION/TRAINING PROGRAM

## 2022-11-22 PROCEDURE — 87529 HSV DNA AMP PROBE: CPT | Performed by: INTERNAL MEDICINE

## 2022-11-22 PROCEDURE — 94003 VENT MGMT INPAT SUBQ DAY: CPT

## 2022-11-22 PROCEDURE — 99233 SBSQ HOSP IP/OBS HIGH 50: CPT | Performed by: INTERNAL MEDICINE

## 2022-11-22 PROCEDURE — 999N000157 HC STATISTIC RCP TIME EA 10 MIN

## 2022-11-22 PROCEDURE — 82247 BILIRUBIN TOTAL: CPT | Performed by: INTERNAL MEDICINE

## 2022-11-22 PROCEDURE — 84460 ALANINE AMINO (ALT) (SGPT): CPT | Performed by: INTERNAL MEDICINE

## 2022-11-22 PROCEDURE — 250N000011 HC RX IP 250 OP 636: Performed by: INTERNAL MEDICINE

## 2022-11-22 PROCEDURE — 82330 ASSAY OF CALCIUM: CPT | Performed by: STUDENT IN AN ORGANIZED HEALTH CARE EDUCATION/TRAINING PROGRAM

## 2022-11-22 PROCEDURE — 85014 HEMATOCRIT: CPT | Performed by: STUDENT IN AN ORGANIZED HEALTH CARE EDUCATION/TRAINING PROGRAM

## 2022-11-22 PROCEDURE — 83735 ASSAY OF MAGNESIUM: CPT | Performed by: STUDENT IN AN ORGANIZED HEALTH CARE EDUCATION/TRAINING PROGRAM

## 2022-11-22 PROCEDURE — 250N000013 HC RX MED GY IP 250 OP 250 PS 637: Performed by: STUDENT IN AN ORGANIZED HEALTH CARE EDUCATION/TRAINING PROGRAM

## 2022-11-22 PROCEDURE — 94640 AIRWAY INHALATION TREATMENT: CPT | Mod: 76

## 2022-11-22 PROCEDURE — 82248 BILIRUBIN DIRECT: CPT | Performed by: INTERNAL MEDICINE

## 2022-11-22 PROCEDURE — 85007 BL SMEAR W/DIFF WBC COUNT: CPT | Performed by: SPECIALIST

## 2022-11-22 PROCEDURE — 200N000001 HC R&B ICU

## 2022-11-22 PROCEDURE — 250N000011 HC RX IP 250 OP 636: Performed by: SPECIALIST

## 2022-11-22 PROCEDURE — 94799 UNLISTED PULMONARY SVC/PX: CPT

## 2022-11-22 PROCEDURE — 82805 BLOOD GASES W/O2 SATURATION: CPT | Performed by: INTERNAL MEDICINE

## 2022-11-22 PROCEDURE — 85520 HEPARIN ASSAY: CPT | Performed by: STUDENT IN AN ORGANIZED HEALTH CARE EDUCATION/TRAINING PROGRAM

## 2022-11-22 PROCEDURE — 250N000011 HC RX IP 250 OP 636: Performed by: SURGERY

## 2022-11-22 PROCEDURE — 250N000013 HC RX MED GY IP 250 OP 250 PS 637: Performed by: INTERNAL MEDICINE

## 2022-11-22 PROCEDURE — 86696 HERPES SIMPLEX TYPE 2 TEST: CPT | Performed by: INTERNAL MEDICINE

## 2022-11-22 PROCEDURE — 84100 ASSAY OF PHOSPHORUS: CPT | Performed by: STUDENT IN AN ORGANIZED HEALTH CARE EDUCATION/TRAINING PROGRAM

## 2022-11-22 PROCEDURE — 85027 COMPLETE CBC AUTOMATED: CPT | Performed by: SPECIALIST

## 2022-11-22 PROCEDURE — 94667 MNPJ CHEST WALL 1ST: CPT

## 2022-11-22 PROCEDURE — 999N000009 HC STATISTIC AIRWAY CARE

## 2022-11-22 PROCEDURE — G0463 HOSPITAL OUTPT CLINIC VISIT: HCPCS

## 2022-11-22 PROCEDURE — 99233 SBSQ HOSP IP/OBS HIGH 50: CPT | Performed by: SPECIALIST

## 2022-11-22 PROCEDURE — 82803 BLOOD GASES ANY COMBINATION: CPT | Performed by: INTERNAL MEDICINE

## 2022-11-22 PROCEDURE — 94640 AIRWAY INHALATION TREATMENT: CPT

## 2022-11-22 PROCEDURE — 250N000011 HC RX IP 250 OP 636: Performed by: STUDENT IN AN ORGANIZED HEALTH CARE EDUCATION/TRAINING PROGRAM

## 2022-11-22 RX ORDER — HEPARIN SODIUM,PORCINE 10 UNIT/ML
5-20 VIAL (ML) INTRAVENOUS
Status: DISCONTINUED | OUTPATIENT
Start: 2022-11-22 | End: 2022-12-15 | Stop reason: HOSPADM

## 2022-11-22 RX ORDER — CALCIUM CHLORIDE, MAGNESIUM CHLORIDE, SODIUM CHLORIDE, SODIUM BICARBONATE, POTASSIUM CHLORIDE AND SODIUM PHOSPHATE DIBASIC DIHYDRATE 3.68; 3.05; 6.34; 3.09; .314; .187 G/L; G/L; G/L; G/L; G/L; G/L
INJECTION INTRAVENOUS CONTINUOUS
Status: DISCONTINUED | OUTPATIENT
Start: 2022-11-22 | End: 2022-11-23 | Stop reason: CLARIF

## 2022-11-22 RX ORDER — HEPARIN SODIUM,PORCINE 10 UNIT/ML
5-20 VIAL (ML) INTRAVENOUS EVERY 24 HOURS
Status: DISCONTINUED | OUTPATIENT
Start: 2022-11-22 | End: 2022-12-15 | Stop reason: HOSPADM

## 2022-11-22 RX ADMIN — CALCIUM CHLORIDE, MAGNESIUM CHLORIDE, SODIUM CHLORIDE, SODIUM BICARBONATE, POTASSIUM CHLORIDE AND SODIUM PHOSPHATE DIBASIC DIHYDRATE 5000 ML: 3.68; 3.05; 6.34; 3.09; .314; .187 INJECTION INTRAVENOUS at 06:07

## 2022-11-22 RX ADMIN — ACETYLCYSTEINE 2 ML: 200 SOLUTION ORAL; RESPIRATORY (INHALATION) at 11:36

## 2022-11-22 RX ADMIN — Medication 50 MCG: at 18:10

## 2022-11-22 RX ADMIN — Medication 1 DROP: at 08:05

## 2022-11-22 RX ADMIN — ACETYLCYSTEINE 2 ML: 200 SOLUTION ORAL; RESPIRATORY (INHALATION) at 18:57

## 2022-11-22 RX ADMIN — POLYETHYLENE GLYCOL 3350 17 G: 17 POWDER, FOR SOLUTION ORAL at 08:05

## 2022-11-22 RX ADMIN — PROPOFOL 20 MCG/KG/MIN: 10 INJECTION, EMULSION INTRAVENOUS at 14:19

## 2022-11-22 RX ADMIN — CALCIUM CHLORIDE, MAGNESIUM CHLORIDE, DEXTROSE MONOHYDRATE, LACTIC ACID, SODIUM CHLORIDE, SODIUM BICARBONATE AND POTASSIUM CHLORIDE: 5.15; 2.03; 22; 5.4; 6.46; 3.09; .157 INJECTION INTRAVENOUS at 08:26

## 2022-11-22 RX ADMIN — CHLORHEXIDINE GLUCONATE 0.12% ORAL RINSE 15 ML: 1.2 LIQUID ORAL at 07:44

## 2022-11-22 RX ADMIN — Medication 50 MCG: at 14:25

## 2022-11-22 RX ADMIN — Medication 50 MCG: at 09:52

## 2022-11-22 RX ADMIN — CEFTRIAXONE SODIUM 2 G: 2 INJECTION, POWDER, FOR SOLUTION INTRAMUSCULAR; INTRAVENOUS at 04:08

## 2022-11-22 RX ADMIN — CALCIUM CHLORIDE, MAGNESIUM CHLORIDE, SODIUM CHLORIDE, SODIUM BICARBONATE, POTASSIUM CHLORIDE AND SODIUM PHOSPHATE DIBASIC DIHYDRATE 5000 ML: 3.68; 3.05; 6.34; 3.09; .314; .187 INJECTION INTRAVENOUS at 14:40

## 2022-11-22 RX ADMIN — HYDROCORTISONE SODIUM SUCCINATE 50 MG: 100 INJECTION, POWDER, FOR SOLUTION INTRAMUSCULAR; INTRAVENOUS at 22:38

## 2022-11-22 RX ADMIN — INSULIN ASPART 2 UNITS: 100 INJECTION, SOLUTION INTRAVENOUS; SUBCUTANEOUS at 20:03

## 2022-11-22 RX ADMIN — HYDROCORTISONE SODIUM SUCCINATE 20 MG: 100 INJECTION, POWDER, FOR SOLUTION INTRAMUSCULAR; INTRAVENOUS at 04:09

## 2022-11-22 RX ADMIN — CHLORHEXIDINE GLUCONATE 0.12% ORAL RINSE 15 ML: 1.2 LIQUID ORAL at 20:03

## 2022-11-22 RX ADMIN — VORICONAZOLE 300 MG: 40 POWDER, FOR SUSPENSION ORAL at 20:03

## 2022-11-22 RX ADMIN — HEPARIN SODIUM 500 UNITS/HR: 10000 INJECTION, SOLUTION INTRAVENOUS at 05:09

## 2022-11-22 RX ADMIN — CALCIUM CHLORIDE, MAGNESIUM CHLORIDE, SODIUM CHLORIDE, SODIUM BICARBONATE, POTASSIUM CHLORIDE AND SODIUM PHOSPHATE DIBASIC DIHYDRATE 5000 ML: 3.68; 3.05; 6.34; 3.09; .314; .187 INJECTION INTRAVENOUS at 10:02

## 2022-11-22 RX ADMIN — Medication 50 MCG: at 16:05

## 2022-11-22 RX ADMIN — PROPOFOL 20 MCG/KG/MIN: 10 INJECTION, EMULSION INTRAVENOUS at 22:53

## 2022-11-22 RX ADMIN — ACETYLCYSTEINE 2 ML: 200 SOLUTION ORAL; RESPIRATORY (INHALATION) at 07:25

## 2022-11-22 RX ADMIN — Medication 15 ML: at 08:05

## 2022-11-22 RX ADMIN — CALCIUM CHLORIDE, MAGNESIUM CHLORIDE, SODIUM CHLORIDE, SODIUM BICARBONATE, POTASSIUM CHLORIDE AND SODIUM PHOSPHATE DIBASIC DIHYDRATE 5000 ML: 3.68; 3.05; 6.34; 3.09; .314; .187 INJECTION INTRAVENOUS at 01:41

## 2022-11-22 RX ADMIN — Medication 1 DROP: at 22:38

## 2022-11-22 RX ADMIN — ACETYLCYSTEINE 2 ML: 200 SOLUTION ORAL; RESPIRATORY (INHALATION) at 15:10

## 2022-11-22 RX ADMIN — Medication 1 DROP: at 15:41

## 2022-11-22 RX ADMIN — HYDROCORTISONE SODIUM SUCCINATE 20 MG: 100 INJECTION, POWDER, FOR SOLUTION INTRAMUSCULAR; INTRAVENOUS at 10:13

## 2022-11-22 RX ADMIN — SENNOSIDES AND DOCUSATE SODIUM 1 TABLET: 50; 8.6 TABLET ORAL at 08:05

## 2022-11-22 RX ADMIN — CALCIUM CHLORIDE, MAGNESIUM CHLORIDE, DEXTROSE MONOHYDRATE, LACTIC ACID, SODIUM CHLORIDE, SODIUM BICARBONATE AND POTASSIUM CHLORIDE 5000 ML: 5.15; 2.03; 22; 5.4; 6.46; 3.09; .157 INJECTION INTRAVENOUS at 05:38

## 2022-11-22 RX ADMIN — Medication 40 MG: at 07:43

## 2022-11-22 RX ADMIN — IPRATROPIUM BROMIDE AND ALBUTEROL SULFATE 3 ML: .5; 3 SOLUTION RESPIRATORY (INHALATION) at 11:36

## 2022-11-22 RX ADMIN — INSULIN ASPART 2 UNITS: 100 INJECTION, SOLUTION INTRAVENOUS; SUBCUTANEOUS at 12:10

## 2022-11-22 RX ADMIN — HYDROCORTISONE SODIUM SUCCINATE 20 MG: 100 INJECTION, POWDER, FOR SOLUTION INTRAMUSCULAR; INTRAVENOUS at 15:40

## 2022-11-22 RX ADMIN — PHENYLEPHRINE HYDROCHLORIDE 1.2 MCG/KG/MIN: 10 INJECTION INTRAVENOUS at 11:46

## 2022-11-22 RX ADMIN — SENNOSIDES AND DOCUSATE SODIUM 1 TABLET: 50; 8.6 TABLET ORAL at 20:03

## 2022-11-22 RX ADMIN — SODIUM CHLORIDE, PRESERVATIVE FREE 2.6 ML: 5 INJECTION INTRAVENOUS at 17:26

## 2022-11-22 RX ADMIN — INSULIN ASPART 3 UNITS: 100 INJECTION, SOLUTION INTRAVENOUS; SUBCUTANEOUS at 16:16

## 2022-11-22 RX ADMIN — PROPOFOL 25 MCG/KG/MIN: 10 INJECTION, EMULSION INTRAVENOUS at 00:27

## 2022-11-22 RX ADMIN — INSULIN ASPART 1 UNITS: 100 INJECTION, SOLUTION INTRAVENOUS; SUBCUTANEOUS at 07:43

## 2022-11-22 RX ADMIN — CALCIUM CHLORIDE, MAGNESIUM CHLORIDE, DEXTROSE MONOHYDRATE, LACTIC ACID, SODIUM CHLORIDE, SODIUM BICARBONATE AND POTASSIUM CHLORIDE 5000 ML: 5.15; 2.03; 22; 5.4; 6.46; 3.09; .157 INJECTION INTRAVENOUS at 10:34

## 2022-11-22 RX ADMIN — IPRATROPIUM BROMIDE AND ALBUTEROL SULFATE 3 ML: .5; 3 SOLUTION RESPIRATORY (INHALATION) at 07:25

## 2022-11-22 RX ADMIN — PHENYLEPHRINE HYDROCHLORIDE 1 MCG/KG/MIN: 10 INJECTION INTRAVENOUS at 20:17

## 2022-11-22 RX ADMIN — PHENYLEPHRINE HYDROCHLORIDE 1.2 MCG/KG/MIN: 10 INJECTION INTRAVENOUS at 05:49

## 2022-11-22 RX ADMIN — IPRATROPIUM BROMIDE AND ALBUTEROL SULFATE 3 ML: .5; 3 SOLUTION RESPIRATORY (INHALATION) at 18:57

## 2022-11-22 RX ADMIN — IPRATROPIUM BROMIDE AND ALBUTEROL SULFATE 3 ML: .5; 3 SOLUTION RESPIRATORY (INHALATION) at 15:10

## 2022-11-22 RX ADMIN — VORICONAZOLE 300 MG: 40 POWDER, FOR SUSPENSION ORAL at 08:05

## 2022-11-22 RX ADMIN — INSULIN ASPART 1 UNITS: 100 INJECTION, SOLUTION INTRAVENOUS; SUBCUTANEOUS at 04:28

## 2022-11-22 ASSESSMENT — ACTIVITIES OF DAILY LIVING (ADL)
ADLS_ACUITY_SCORE: 49
ADLS_ACUITY_SCORE: 51
ADLS_ACUITY_SCORE: 51
ADLS_ACUITY_SCORE: 49
ADLS_ACUITY_SCORE: 51
ADLS_ACUITY_SCORE: 55
ADLS_ACUITY_SCORE: 51
ADLS_ACUITY_SCORE: 51
ADLS_ACUITY_SCORE: 49

## 2022-11-22 NOTE — PLAN OF CARE
"Shift Summary: 4566-2063- no acute events    Neuro: SOTO to command, PERRL. When roused for assessments, shakes head \"no\" and appears frustrated at times.  CV: SB. MAP>65 on 1.2 mcg/kg/min. Amio gtt stopped at 1145.   Resp: 40%/+8, improving pO2. Dim LS. Thick ETT secretions  GI: TF advanced to 80(goal) at 1400. 30Q4hr FWF. BS+BM-  : anuric. Tolerating CRRT net removal 100-200/hr. Per neph, if filter clots/clogs, discontinue CRRT.   Inf/Endo: afebrile. sliding scale insulin. Lytes WNL.  Lines/Gtts: RIJ HD, R fem CVC and macie, PIVx3  Family: pts spouse, sister, and friend present at bedside.  Skin: pt seen by WOC today, injury on R nare worsening- order received to get nasal swab for herpes. Message left for WOC RN to clarify what type of herpes testing needed as there are multiple. (update: discussed with MD and received order for 2 samples) Wound on coccyx and upper lip improving.       Jyotsna Plasencia RN on 11/22/2022 at 2:20 PM               "

## 2022-11-22 NOTE — PROGRESS NOTES
[facility]    Infectious Disease Progress Note    Date of Service: 11/22/2022       Assessment:  57 YM with hx of liver transplantation who is hopsitalzied with out of hospital PEA cardiac arrest with 20 min CPR and found to have pneumococcal pneumonia with ARDS and sepsis superimposed on  Parainfluenza pneumonia. He is in septic shock with multiorgan failure with acute hypoxic respiratory failure requiring mechanical ventilation, LORETTA requiring CRRT and is hypothermic. Course has been complicated by barotrauma with right pneumothorax requiring chest tube No clinical meningitis.     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 CT abdomen/pelvis 11/12 did not show intra abdominal pathology  -S/p liver transplantation for NAFLD related cirrhosis. On Tacrolimus  -LORETTA on CRRT  -Thrombocytopenia related to sepsis improving  -anemia     Recommendations  1. Continue Ceftriaxone 2 grams daily -plan treatment for 2 weeks for bacteremia/sepsis until 11/27  2.  Oral Voriconazole Q12H . Check level in 5 days  3. Supportive care  Remains critically ill      Tori Hernández MD    Interval History   Remains sedated, intubated, family at bedside. Remains on dialysis no new acute events    Physical Exam   Temp: 98.8  F (37.1  C) Temp src: Axillary  Pulse: (!) 46   Resp: 21 SpO2: 97 % O2 Device: Mechanical Ventilator    Vitals:    11/20/22 0600 11/21/22 0600 11/22/22 0200   Weight: 102.4 kg (225 lb 12 oz) 102.2 kg (225 lb 5 oz) 99.6 kg (219  lb 9.3 oz)     Vital Signs with Ranges  Temp:  [97.3  F (36.3  C)-99  F (37.2  C)] 98.8  F (37.1  C)  Pulse:  [45-69] 46  Resp:  [16-29] 21  MAP:  [59 mmHg-104 mmHg] 64 mmHg  Arterial Line BP: ()/(47-97) 95/48  FiO2 (%):  [40 %-60 %] 40 %  SpO2:  [85 %-100 %] 97 %    Constitutional: intubated  Lungs: anterior auscultation clear, right sided chest tube  Cardiovascular: S1S2  Abdomen: soft  Skin: Herpes labialis    Other:    Medications     dextrose       CRRT replacement solution 5,000 mL (11/22/22 1034)     fentaNYL 25 mcg/hr (11/22/22 0741)     heparin 500 Units/hr (11/22/22 0741)     - MEDICATION INSTRUCTIONS -       - MEDICATION INSTRUCTIONS -       norepinephrine Stopped (11/21/22 1320)     phenylephrine 1.2 mcg/kg/min (11/22/22 1301)     CRRT replacement solution 200 mL/hr at 11/22/22 0826     CRRT replacement solution 1,000 mL/hr at 11/22/22 1146     propofol 20 mcg/kg/min (11/22/22 0742)     sodium chloride 10 mL/hr at 11/22/22 0742     sodium chloride 0 mL/hr at 11/22/22 0742     vasopressin         acetylcysteine  2 mL Nebulization 4x Daily     artificial tears  1 drop Both Eyes TID     cefTRIAXone  2 g Intravenous Q24H     chlorhexidine  15 mL Mouth/Throat Q12H     hydrocortisone sodium succinate PF  20 mg Intravenous Q6H     insulin aspart  1-12 Units Subcutaneous Q4H     ipratropium - albuterol 0.5 mg/2.5 mg/3 mL  3 mL Nebulization 4x daily     multivitamins w/minerals  15 mL Per Feeding Tube Daily     pantoprazole  40 mg Per Feeding Tube QAM AC    Or     pantoprazole  40 mg Intravenous QAM AC     polyethylene glycol  17 g Oral Daily     senna-docusate  1 tablet Oral BID    Or     senna-docusate  2 tablet Oral BID     voriconazole  300 mg Oral or Feeding Tube Q12H [facility] (08/20)       Data   All microbiology laboratory data reviewed.  Recent Labs   Lab Test 11/22/22  1200 11/22/22  0423 11/22/22  0025   WBC 37.9* 42.8* 47.0*   HGB 10.2* 9.6* 10.5*   HCT 31.6* 29.9* 32.6*   * 105* 107*     191 229     Recent Labs   Lab Test 11/22/22  1200 11/22/22  0423 11/21/22 2012   CR 0.95 0.94 0.81     Microbiology  11/15 ET tube  Endotracheal; Sputum    0 Result Notes  Culture 1+ Aspergillus terreus Abnormal             11/12 blood cx  Peripheral Blood    0 Result Notes  Culture Positive on the 1st day of incubation Abnormal         Streptococcus pneumoniae Panic     2 of 2 bottles  Sent to [facility] [place] [facility] for serotyping, report to follow.         Resulting Agency: [facility]      Susceptibility                   Streptococcus pneumoniae       KARAN       Ceftriaxone (meningitis) 0.016 ug/mL Susceptible       Ceftriaxone (non-meningitis) 0.016 ug/mL Susceptible       Levofloxacin 1.5 ug/mL Susceptible       Meropenem 0.012 ug/mL Susceptible       Penicillin (meningitis) 0.016 ug/mL Susceptible       Penicillin (non-meningitis) 0.016 ug/mL Susceptible       Penicillin(oral) 0.016 ug/mL Susceptible       Vancomycin 0.50 ug/mL Susceptible                       Imaging  11/17 MRI brain  EXAM: MR BRAIN W/O and W CONTRAST  LOCATION: [facility]  DATE/TIME: 11/17/2022 6:58 PM     INDICATION: Evaluate for anoxic brain injury following cardiac arrest.  COMPARISON: Head CT 11/12/2022 and 11/16/2022.  CONTRAST: 8.5 mL Gadavist  TECHNIQUE: Routine multiplanar multisequence head MRI without and with intravenous contrast.     FINDINGS:  INTRACRANIAL CONTENTS: Multifocal areas of restricted diffusion are identified intracranially with cortical/subcortical involvement of the paramedian posterior right parietal level, deep white matter posterior right frontal corona radiata, anterior   subcortical white matter left frontal lobe, and left periventricular temporooccipital junction deep white matter, with restricted diffusion, corresponding low ADC map value and fairly concordant FLAIR hyperintense signal abnormality. No evidence for   hemorrhagic transformation. These areas of ischemic involvement  are better defined than previous CT due to modality/diffusion-weighted imaging. Multifocal distribution suggests possible central thromboembolic etiology but is nonspecific. There is no mass   effect, sulcal effacement or ventricular effacement present. No additional restricted diffusion abnormality. Otherwise parenchymal signal is satisfactory for age. Mild to moderate generalized cerebral atrophy. No hydrocephalus. Normal position of the   cerebellar tonsils. Postcontrast imaging shows no corresponding enhancement abnormality of the areas of restricted diffusion. No pathologic enhancement is present. No abnormal intraparenchymal leptomeningeal or dural enhancement. Dural venous sinus   enhancement is satisfactory. Meckel's cave and cavernous sinus enhancement is normal. No pathologic orbital enhancement.     SELLA: No abnormality accounting for technique.     OSSEOUS STRUCTURES/SOFT TISSUES: No evidence for marrow infiltrative process. No diploic space, skull base/clivus or upper cervical marrow signal abnormality is noted. Patient is nasally and orally intubated. The major intracranial vascular flow voids   are maintained.      ORBITS: No abnormality accounting for technique. No pathologic orbital enhancement.     SINUSES/MASTOIDS: Air-fluid levels in the maxillary sinuses with partial opacification of the ethmoid air cells and sphenoid sinus. Fluid opacification in the nasopharynx related to patient's intubated status presumed. Complete/near complete   opacification of the mastoid air cells bilaterally. No apparent mass in the posterior nasopharynx or skull base.                                                                       IMPRESSION:  1.  Multifocal punctate and confluent areas of nonhemorrhagic acute to subacute ischemic change with concordant FLAIR hyperintensity as above. No corresponding enhancement abnormality. No mass effect, sulcal effacement or midline shift. No ventricular   effacement     2.   Cortical/subcortical involvement is identified right parietal level with punctate involvement noted elsewhere deep white matter posterior right frontal level, left frontal subcortical white matter and left periventricular temporal level.     3.  No pathologic enhancement is noted intracranially     4.  Satisfactory ventricular caliber     5.  Opacification mastoid air cells bilaterally. Air-fluid level paranasal sinuses. Patient is nasally and orally intubated on today's examination.

## 2022-11-22 NOTE — PLAN OF CARE
No noted neuro changes this shift.  Pt slow to respond but following commands and moving all extremities.  Pt SR-SR with Yrn infusion being titrated to maintain MAP greater than 65 (see MAR).  Pt tolerating current vent settings this shift.  Pt has coarse lung sounds with large amounts of ETT secretions.  TF infusing and increased per order (see flowsheet).  Pt tolerating CRRT.  No new noted skin breakdown this shift.

## 2022-11-22 NOTE — PROGRESS NOTES
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 Acute respiratory failure - he has been able to come down on vent settings and now at 40% and +6 PEEP; he continues to have pneumonia and likely requiring pulmonary hygiene. Continue to titrate down vent support and monitor closely.  2. Pneumonia - strep and parainfluenza; on Rocephin and voriconazole  3. ID- voriconazole for possible fungal infection per ID; aspergillus in sputum.   4. Shock - he remains on phenylephrine at 1.2   4. Acute renal failure - completing CRRT and will plan HD in next 1-2 days  5. S/p initial arrest and prolonged resuscitation (11.9) and hypothermia resuscitation   6. History of Liver Transplant 2016 - off tacrolimus but on steroids; Discussed with Liver Tx service and appreciate input- maintain him on at least the equivalent of prednisone 10 mgms/day.   7. History of HTN  8. CHRISTIAN on BIPAP  9. Nutrition enteral   10. CNS- though awake and following commands, long term CNS prognosis is guarded. MRI 11/17 and will discuss with neuro again.             Summary/Hospital Course:           Assessment and plan :     Blanco Osborne IS a 57 year old male admitted on 11/12/2022 for acute respiratory failure.   I have personally reviewed the daily labs, imaging studies, cultures and discussed the case with referring physician and  consulting physicians.     My assessment and plan by system for this patient is as follows:    Neurology/Psychiatry:   1. Sedated on vent; still unable to do full assessment after arrest and resuscitation     Cardiovascular:   1.Hemodynamics - improved but still in shock on Levophed    Pulmonary/Ventilator Management:   1. Improving slowly, on 40% and +6 PEEP    GI and Nutrition :   1. Enteral nutrition     Renal/Fluids/Electrolytes:   1. ATN -  Improving only slow.     Infectious Disease:   1. No changes today     Endocrine:   1. Glucoses ok     Hematology/Oncology:   1. Hb 10.2     IV/Access:   1. Venous access -   2. Arterial access -   3.  Plan  - central access required and necessary      ICU Prophylaxis:   1. DVT: Hep Subq/ LMWH/mechanical  2. VAP: HOB 30 degrees, chlorhexidine rinse  3. Stress Ulcer: PPI/H2 blocker  4. Restraints: Nonviolent soft two point restraints required and necessary for patient safety and continued cares and good effect as patient continues to pull at necessary lines, tubes despite education and distraction. Will readdress daily.   5. IV Access - central access required and necessary for continued patient cares  6. Feeding - enteral   7. Family Update: discussed with family by phone; missed them at bedside  8. Disposition - ICU care        Key goals for next 24 hours:   1. CRRT stopped today   2.  3.               Interim History:              Key Medications:       acetylcysteine  2 mL Nebulization 4x Daily     artificial tears  1 drop Both Eyes TID     cefTRIAXone  2 g Intravenous Q24H     chlorhexidine  15 mL Mouth/Throat Q12H     heparin lock flush  5-20 mL Intracatheter Q24H     hydrocortisone sodium succinate PF  20 mg Intravenous Q6H     insulin aspart  1-12 Units Subcutaneous Q4H     ipratropium - albuterol 0.5 mg/2.5 mg/3 mL  3 mL Nebulization 4x daily     multivitamins w/minerals  15 mL Per Feeding Tube Daily     pantoprazole  40 mg Per Feeding Tube QAM AC    Or      pantoprazole  40 mg Intravenous QAM AC     polyethylene glycol  17 g Oral Daily     senna-docusate  1 tablet Oral BID    Or     senna-docusate  2 tablet Oral BID     sodium chloride (PF)  10-40 mL Intracatheter Q8H     voriconazole  300 mg Oral or Feeding Tube Q12H [facility] (08/20)       dextrose       CRRT replacement solution 5,000 mL (11/22/22 1034)     fentaNYL 25 mcg/hr (11/22/22 0741)     heparin 500 Units/hr (11/22/22 0741)     - MEDICATION INSTRUCTIONS -       - MEDICATION INSTRUCTIONS -       norepinephrine Stopped (11/21/22 1320)     phenylephrine 1.2 mcg/kg/min (11/22/22 1301)     CRRT replacement solution 200 mL/hr at 11/22/22 0826     CRRT replacement solution 5,000 mL (11/22/22 1440)     propofol 20 mcg/kg/min (11/22/22 1419)     sodium chloride 10 mL/hr at 11/22/22 0742     sodium chloride 0 mL/hr at 11/22/22 0742     vasopressin                 Physical Examination:   Temp:  [96.8  F (36  C)-99  F (37.2  C)] 96.8  F (36  C)  Pulse:  [45-63] 48  Resp:  [17-29] 21  MAP:  [59 mmHg-104 mmHg] 73 mmHg  Arterial Line BP: ()/(47-97) 108/54  FiO2 (%):  [40 %-60 %] 40 %  SpO2:  [90 %-100 %] 95 %    Intake/Output Summary (Last 24 hours) at 11/22/2022 1733  Last data filed at 11/22/2022 1600  Gross per 24 hour   Intake 3130.34 ml   Output 5083 ml   Net -1952.66 ml     Wt Readings from Last 4 Encounters:   11/22/22 99.6 kg (219 lb 9.3 oz)   10/07/19 137.5 kg (303 lb 3.2 oz)   10/04/19 131.5 kg (290 lb)   02/20/19 140.4 kg (309 lb 9.6 oz)     Arterial Line BP: ()/(47-97) 108/54  MAP:  [59 mmHg-104 mmHg] 73 mmHg  Vent Mode: CMV/AC  (Continuous Mandatory Ventilation/ Assist Control)  FiO2 (%): 40 %  Resp Rate (Set): 22 breaths/min  Tidal Volume (Set, mL): 500 mL  PEEP (cm H2O): 6 cmH2O  Resp: 21    Recent Labs   Lab 11/22/22  1545 11/22/22  0845 11/22/22  0423 11/22/22  0025   PH 7.49* 7.46* 7.46* 7.43   PCO2 32* 33* 34* 36   PO2 136* 151* 140* 90   HCO3 24 24 24 24   O2PER 40 55 55 55       GEN: no acute  distress; comfortable on vent    HEENT: head ncat, sclera anicteric, OP patent, trachea midline   PULM: unlabored synchronous with vent, clear anteriorly    CV/COR: RRR S1S2 no gallop,  No rub, no murmur  ABD: soft nontender,   EXT:  Minimal edema   warm  NEURO: follows family's commands and able to answer their questions   SKIN: no obvious rash; no cyanosis or mottling   LINES: clean, dry intact         Data:   All data and imaging reviewed     ROUTINE ICU LABS (Last four results)  CMP  Recent Labs   Lab 11/22/22  1550 11/22/22  1204 11/22/22  1200 11/22/22  0734 11/22/22  0427 11/22/22  0423 11/21/22  2021 11/21/22 2012 11/21/22  1601 11/21/22  1150 11/21/22  0607 11/21/22  0401 11/20/22  0331 11/20/22  0326 11/19/22  0452 11/19/22  0447   NA  --   --  135  --   --  137  --  136  --  138  --  137   < > 137   < > 138   POTASSIUM  --   --  4.4  --   --  4.4  --  4.5  --  4.5  --  4.6   < > 4.0   < > 3.8   CHLORIDE  --   --  107  --   --  107  --  107  --  106  --  107   < > 106   < > 106   CO2  --   --  21  --   --  25  --  21  --  21  --  27   < > 24   < > 22   ANIONGAP  --   --  7  --   --  5  --  8  --  11  --  3   < > 7   < > 10   * 168* 185* 141*   < > 168*   < > 146*   < > 120*   < > 116*   < > 172*   < > 208*   BUN  --   --  32*  --   --  30  --  27  --  24  --  23   < > 28   < > 34*   CR  --   --  0.95  --   --  0.94  --  0.81  --  0.76  --  0.78   < > 0.76   < > 1.00   GFRESTIMATED  --   --  >90  --   --  >90  --  >90  --  >90  --  >90   < > >90   < > 87   KELLY  --   --  7.9*  --   --  7.9*  --  8.2*  --  8.4*  --  8.2*   < > 8.0*   < > 7.7*   MAG  --   --  2.3  --   --  2.3  --  2.2  --  2.2  --  2.3   < > 2.2   < > 2.1   PHOS  --   --  3.8  --   --  3.9  --  3.6  --  3.7  --  4.8*   < > 2.0*   < > 2.1*   PROTTOTAL  --   --   --   --   --  5.5*  --   --   --   --   --  6.2*  --  5.5*  --  5.2*   ALBUMIN  --   --  2.0*  --   --  1.9*  --  1.9*  --  1.9*  --  2.1*   < > 1.8*   < > 1.8*   BILITOTAL  --    --   --   --   --  1.5*  --   --   --   --   --  1.4*  --  1.4*  --  1.5*   ALKPHOS  --   --   --   --   --  198*  --   --   --   --   --  209*  --  217*  --  182*   AST  --   --   --   --   --  40  --   --   --   --   --  36  --  33  --  30   ALT  --   --   --   --   --  40  --   --   --   --   --  42  --  38  --  32    < > = values in this interval not displayed.     CBC  Recent Labs   Lab 11/22/22  1200 11/22/22  0423 11/22/22  0025 11/21/22 2012   WBC 37.9* 42.8* 47.0* 48.6*   RBC 3.04* 2.84* 3.06* 2.98*   HGB 10.2* 9.6* 10.5* 10.0*   HCT 31.6* 29.9* 32.6* 31.8*   * 105* 107* 107*   MCH 33.6* 33.8* 34.3* 33.6*   MCHC 32.3 32.1 32.2 31.4*   RDW 18.6* 18.3* 18.5* 18.2*    191 229 267     INRNo lab results found in last 7 days.  Arterial Blood Gas  Recent Labs   Lab 11/22/22  1545 11/22/22  0845 11/22/22  0423 11/22/22  0025   PH 7.49* 7.46* 7.46* 7.43   PCO2 32* 33* 34* 36   PO2 136* 151* 140* 90   HCO3 24 24 24 24   O2PER 40 55 55 55       All cultures:  No results for input(s): CULT in the last 168 hours.  No results found for this or any previous visit (from the past 24 hour(s)).    MD Jessica    Billing: This patient is critically ill: Yes. Total critical care time today 45 min.

## 2022-11-22 NOTE — PROGRESS NOTES
[facility] ICU RESPIRATORY NOTE        Date of Admission: 11/12/2022    Date of Intubation (most recent): 11/21/2022    Reason for Mechanical Ventilation: resp failure    Number of Days on Mechanical Ventilation: 1    Met Criteria for Spontaneous Breathing Trial: No    Reason for No Spontaneous Breathing Trial: Per MD    Significant Events Today: None    ABG Results:   Recent Labs   Lab 11/22/22  0025 11/21/22 2012 11/21/22  1600 11/21/22  1152   PH 7.43 7.44 7.43 7.43   PCO2 36 36 36 36   PO2 90 87 70* 83   HCO3 24 25 24 23   O2PER 55 60 50 50       Current Vent Settings: Vent Mode: CMV/AC  (Continuous Mandatory Ventilation/ Assist Control)  FiO2 (%): 55 %  Resp Rate (Set): 22 breaths/min  Tidal Volume (Set, mL): 500 mL  PEEP (cm H2O): 10 cmH2O  Resp: 21      Skin Assessment: Upper lip and nares with dried blood, pressure injury on upper lip. Mepilex placed between ETAD and lip.     Plan: Continue full vent support overnight.    Guido Mclain, RT on 11/22/2022 at 2:18 AM

## 2022-11-22 NOTE — PROGRESS NOTES
[facility] Nurse Inpatient Assessment     Consulted for: Philtrum/Columella, bilateral buttock, right nose, upper lip, upper chin    Worsening noted to philtrum/columella area as well as right nose with no identifiable source of pressure. Requesting herpes testing. New area to upper lip and upper right chin possible trauma from emergent intubation or if herpes positive could be related.  Patient History (according to provider note(s):      57M cirrhosis, CARRILLO s/p liver txp (9/2016) admitted to ICU 11/12/22 after out of hospital PEA arrest and acute hypoxemic respiratory failure. Course c/b LORETTA requiring CRRT. Found to have Parainfluenza virus and Streptococcal bacteremia.     Areas Assessed:      Areas visualized during today's visit: Face and neck and Sacrum/coccyx    Pressure Injury Location: Philtrum/Columella    Last photo: 11/22/22 11/16/22  Philtrum/Columella    Right lateral nose 11/16  Wound type: Pressure Injury     Pressure Injury Stage: Deep Tissue Pressure Injury (DTPI), hospital acquired      This is a Medical Device Related Pressure Injury (MDRPI) due to ETT  Wound history/plan of care:  Wound noted before patient had been proned on 11/15 by respiratory. RT. RT had noted that ETAD was tight on patient and the NG was taped to the ETAD. RT had replaced the ETAD and placed a optifoam underneath as well as asked staff to secure NG tube with bridal. Patient had been extubated shortly over the weekend needing an emergent intubation on 11/21. Bleeding from within nare as well, no longer with any feeding tube, difficult to assess due to amount of dried drainage within nares. 11/22 ETAD fully offloaded off area, continual pressure to site does not seem to be contributing factor to worsening of site. Requesting a herpes sample.    Wound base: dried adherent scabbing and dermis some with purple discoloration     Palpation of the wound bed: normal      Drainage: small      Description of drainage: serosanguinous      Measurements (length x width x depth, in cm) 1.8cm  x 3cm  x  <0.1 cm.     Tunneling N/A     Undermining N/A  Periwound skin: Ecchymosis      Color: normal and consistent with surrounding tissue      Temperature: normal   Odor: none  Pain: facial expression of distress,   Pain intervention prior to dressing change: slow and gentle cares   Treatment goal: Heal  and Protection  STATUS: evolving, requesting herpes testing  Supplies ordered: supplies stored on unit and discussed with RN     Wound location: Right nose    Last photo: 11/22/22 11/16/22    Wound due to: Unknown Etiology- possible herpetic lesion  Wound history/plan of care: Originally suspected to be from pressure from the ETAD or bridal especially with proning <24 hours. However, no further pressure has been exerted to the area and wound is worsening. Patient also on vasopressors and poor oxygenation.   Wound base: 50% dermis, 50 % non-blanchable and purple scabbing     Palpation of the wound bed: normal      Drainage: scant     Description of drainage: serosanguinous     Measurements (length x width x depth, in cm): 1.3 x 0.6  x  0.1 cm      Tunneling: N/A     Undermining: N/A  Periwound skin: Intact      Color: normal and consistent with surrounding tissue      Temperature: normal   Odor: none  Pain: facial expression of distress,   Pain interventions prior to dressing change: slow and gentle cares   Treatment goal: Heal  and Protection  STATUS: deteriorating  Supplies ordered: supplies stored on unit and discussed with RN    Pressure Injury Location: Bilateral buttock    Last photo:11/22/22 11/16/22    Wound type: Pressure Injury and Friction     Pressure Injury Stage: 1, hospital acquired      Wound history/plan of care:   Patient has tan nonblanchable tissue to bilateral buttock that appears more likely from friction of the two buttocks rubbing against one another at they mirror one another, this  discoloration appears to be old and in the healing process. However there is nonblanchable reddened tissue laterally to the left buttock that appears new, possibly from linens under patient.    Wound description :  Left lateral buttock 3cm x 1cm x 0 cm Nonblanchable red epidermis.  Bilateral buttock mirrored non-blanchable and dark tan discolored epidermis that feels thin and dry with some dermis exposed  7cm x 2cm x 0.1 cm to right buttock and 5 cm x 1cm x 0 cm to left buttock.      Palpation of the wound bed: normal      Drainage: none     Description of drainage: none     Measurements (length x width x depth, in cm): see above     Tunneling N/A     Undermining N/A  Periwound skin: Intact      Color: normal and consistent with surrounding tissue      Temperature: normal   Odor: none  Pain: absent and denies , none  Pain intervention prior to dressing change: patient tolerated well  Treatment goal: Heal  and Protection  STATUS: improving  Supplies ordered: supplies stored on unit and discussed with RN      My PI Risk Assessment     Sensory Perception: 1 - Completely Limited     Moisture: 3 - Occasionally moist      Activity: 1 - Bedfast      Mobility: 1 - Completely immobile      Nutrition: 1 - Very poor     Friction/Shear: 1 - Problem     TOTAL: 8    Wound location: mid upper lip    Last photo: 11/22/22    Wound due to: Suspect trauma, or could possibly be hereptic lesion, testing requested  Wound history/plan of care: Patient had emergent intubation on 11/22. No devices appear to be resting on injury to indicate pressure. Other wounds area suspicious for herpetic lesions.    Wound base: 100 % pink moist mucosal tissue     Palpation of the wound bed: normal      Drainage: none     Description of drainage: none     Measurements (length x width x depth, in cm): 0.4cm  x 0.7cm  x  0.1 cm      Tunneling: N/A     Undermining: N/A  Periwound skin: Intact      Color: normal and consistent with surrounding tissue       "Temperature: normal   Odor: none  Pain: no grimacing or signs of discomfort, none  Pain interventions prior to dressing change: slow and gentle cares   Treatment goal: Heal  and Protection  STATUS: initial assessment  Supplies ordered: supplies stored on unit and discussed with RN    Wound location: Right upper chin    Last photo: 11/22/22      Wound due to: Unknown Etiology  Wound history/plan of care: Wound does not appear to be from pressure, no devices on this area especially without causing pressure further on lip. Patient had emergent intubation and this could be from trauma due to the intubation. Requested testing for herpes.    Wound base: 100 % non-blanchable, purple and epidermis     Palpation of the wound bed: normal      Drainage: none     Description of drainage: none     Measurements (length x width x depth, in cm): 0.5  x 1  x  0 cm      Tunneling: N/A     Undermining: N/A  Periwound skin: Intact      Color: normal and consistent with surrounding tissue      Temperature: normal   Odor: none  Pain: no grimacing or signs of discomfort, none  Pain interventions prior to dressing change: patient tolerated well  Treatment goal: Heal , leave ONEIL  STATUS: initial assessment    Treatment Plan:     Columella/Philtrum/Nose/ Upper lip wound(s): Every shift   1. Cleanse site with saline. Pat dry with gauze.  2. Ensure Etad has Q tip clearance and place cut strip of Mepilex 4x4 or Optifoam under ETAD to ensure offloading.  OK to leave lower lip/upper chin wound open to air. Avoid vaseline at this time.    Bilateral buttock wounds: Every other day or PRN with soiling  1. Clean wound with saline or MicroKlenz Spray, pat dry  2. Wipe / \"clean\" the surrounding periwound tissue with skin prep (Cavilon No Sting Skin Prep #814400) and allow to dry. This will help protect periwound and help dressing adherence  3. Press a Mepilex Sacral the long way over bilateral buttock to not cover anus, making sure to conform nicely to " skin curvatures.   4. Time and date dressing change  NOTE  Pressure Injury prevention (please order supplies if not in room)  1. Turn/reposition every 1-2 hrs  2.   Float heels off bed with use of pillows or if needed Heel Lift boots (Prevalon #[id number])  3.   If incontinent Cleanse with incontinent cleanser (Yara spray #[id number]) followed by skin barrier protectant (Critic Aid paste)  BID and after each incontinent episode  4.   Prevent sliding and shear by limiting HOB to 30 degrees or less unless contraindicated, use knee gatch first if not contraindicated  5.   Chair cushion pressure redistribution as needed (#092290): please limit sitting to an hour at a time  6.   Optimize nutrition   7.   PULSATE low air loss mattress     Orders: Reviewed and Updated    RECOMMEND PRIMARY TEAM ORDER: None, at this time  Education provided: importance of repositioning, plan of care, Moisture management, Hygiene and Off-loading pressure  Discussed plan of care with: Nurse  WOC nurse follow-up plan: 1-2x weekly  Notify WOC if wound(s) deteriorate.  Nursing to notify the Provider(s) and re-consult the WOC Nurse if new skin concern.    DATA:     Current support surface: Standard  Low air loss mattress with pulsation , ICU  Containment of urine/stool: Incontinent pad in bed  BMI: Body mass index is 29.78 kg/m .   Active diet order: Orders Placed This Encounter      NPO for Medical/Clinical Reasons Except for: Meds, Ice Chips     Output: I/O last 3 completed shifts:  In: 3006.56 [I.V.:2516.56; NG/GT:230]  Out: 3859 [Emesis/NG output:80; Other:3609; Chest Tube:170]     Labs:   Recent Labs   Lab 11/22/22  0423 11/22/22  0025 11/19/22  0447 11/19/22  0212   ALBUMIN 1.9*  --    < >  --    HGB 9.6* 10.5*   < >  --    WBC 42.8* 47.0*   < >  --    A1C  --   --   --  4.7   CRP  --  22.3*  --   --     < > = values in this interval not displayed.     Pressure injury risk assessment:   Sensory Perception: 3-->slightly limited  Moisture:  3-->occasionally moist  Activity: 1-->bedfast  Mobility: 2-->very limited  Nutrition: 2-->probably inadequate  Friction and Shear: 2-->potential problem  Kareem Score: 13    Ashleigh CASTANEDA   Dept. Pager: [phone]  Dept. Office Number: [phone]

## 2022-11-22 NOTE — PROGRESS NOTES
Renal Medicine Progress Note            Assessment/Plan:     Blanco Osborne is a 57-year-old male with PMH CARRILLO s/p liver transplant (9/2016) and cirrhosis admitted 11/12/2022 with out of hospital PEA arrest and acute hypoxemic respiratory failure.  Course complicated by parainfluenza virus, streptococcal bacteremia, and LORETTA requiring CRRT.      # Severe anuric LORETTA:                -CRRT     # Septic shock with MODS:                -pressor    -Rocephin/voriconazole     # Respiratory failure: Re-intubated 11/21g. CXR personally reviewed.   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 Electrolytes are okay.      CRRT order:   Qb 200 ml/hr  Prefilter: Phox 4k at 1200-->1000 ml/hr  Dialyate: PrimaSol 2K at 1000 ml/hr  Post: PrimaSol 2K, 200 mg/hr  FF 20%-->17%  Dose 25 ml/kg/hr     Plan  # Decrease prefilter to 1000 ml/hr  # Continue to aim for net -200 ml/hr if tolerates  # No need to resume CRRT if filter clots again.   # Stop amiodarone due to bradycardia     I discussed the plan with Dr. Gaines in person. I discussed the case with his wife, sister and our ICU RN (Jyotsna) at the bedside.         Interval History:     Afebrile.   Remains on pressor.   CRRT clotted yesterday.   UF with net ~ 100-200 ml/hr.   Net negative ~ 850 ml last 24 hrs  Critically ill and intubated.   Bradycardia. On amiodarone gtt.           Medications and Allergies:       acetylcysteine  2 mL Nebulization 4x Daily     artificial tears  1 drop Both Eyes TID     cefTRIAXone  2 g Intravenous Q24H     chlorhexidine  15 mL Mouth/Throat Q12H     hydrocortisone sodium succinate PF  20 mg Intravenous Q6H     insulin aspart  1-12 Units Subcutaneous Q4H     ipratropium - albuterol 0.5 mg/2.5 mg/3 mL  3 mL Nebulization 4x daily     multivitamins w/minerals  15 mL Per Feeding Tube Daily     pantoprazole  40  mg Per Feeding Tube QAM AC    Or     pantoprazole  40 mg Intravenous QAM AC     polyethylene glycol  17 g Oral Daily     senna-docusate  1 tablet Oral BID    Or     senna-docusate  2 tablet Oral BID     voriconazole  300 mg Oral or Feeding Tube Q12H [facility] (08/20)      No Active Allergies         Physical Exam:   Vitals were reviewed   , Blood pressure (!) 110/97, pulse (!) 48, temperature 99  F (37.2  C), temperature source Axillary, resp. rate 22, height 1.829 m (6'), weight 99.6 kg (219 lb 9.3 oz), SpO2 97 %.    Wt Readings from Last 3 Encounters:   11/22/22 99.6 kg (219 lb 9.3 oz)   10/07/19 137.5 kg (303 lb 3.2 oz)   10/04/19 131.5 kg (290 lb)       Intake/Output Summary (Last 24 hours) at 11/22/2022 1118  Last data filed at 11/22/2022 1100  Gross per 24 hour   Intake 2825.5 ml   Output 4612 ml   Net -1786.5 ml     GENERAL APPEARANCE: Critically ill  HEENT:  Intubated.   RESP: Somewhat coarse.  CV: RRR, nl S1/S2, bradycardia.  ABDOMEN: distended, soft.   EXTREMITIES/SKIN: not much edema  NEURO: Sedated.  RIJ temp cvc          Data:     CBC RESULTS:     Recent Labs   Lab 11/22/22 0423 11/22/22  0025 11/21/22 2012 11/21/22  1150 11/21/22  0401 11/20/22  1945   WBC 42.8* 47.0* 48.6* 52.4* 41.0* 44.5*   RBC 2.84* 3.06* 2.98* 3.01* 2.96* 3.20*   HGB 9.6* 10.5* 10.0* 10.0* 10.0* 10.7*   HCT 29.9* 32.6* 31.8* 32.4* 31.3* 33.9*    229 267 222 130* 117*       Basic Metabolic Panel:  Recent Labs   Lab 11/22/22  0734 11/22/22 0427 11/22/22 0423 11/22/22  0030 11/21/22 2021 11/21/22 2012 11/21/22  1601 11/21/22  1150 11/21/22  0607 11/21/22  0401 11/20/22  1949 11/20/22  1945 11/20/22  1347 11/20/22  1151   NA  --   --  137  --   --  136  --  138  --  137  --  134  --  136   POTASSIUM  --   --  4.4  --   --  4.5  --  4.5  --  4.6  --  4.7  --  4.2   CHLORIDE  --   --  107  --   --  107  --  106  --  107  --  105  --  106   CO2  --   --  25  --   --  21  --  21  --  27  --  26  --  24   BUN  --   --  30  --   --   27  --  24  --  23  --  25  --  29   CR  --   --  0.94  --   --  0.81  --  0.76  --  0.78  --  0.78  --  0.84   * 153* 168* 131* 123* 146*   < > 120*   < > 116*   < > 158*   < > 188*   KELLY  --   --  7.9*  --   --  8.2*  --  8.4*  --  8.2*  --  8.3*  --  7.9*    < > = values in this interval not displayed.       INRNo lab results found in last 7 days.   Attestation:   I have reviewed today's relevant vital signs, notes, medications, labs and imaging.    Zenon Bradshaw MD  [facility] Nephrology  Office phone :[phone]  Pager: [phone]

## 2022-11-23 LAB
ALBUMIN SERPL-MCNC: 1.8 G/DL (ref 3.4–5)
ALLEN'S TEST: ABNORMAL
ALP SERPL-CCNC: 201 U/L (ref 40–150)
ALT SERPL W P-5'-P-CCNC: 31 U/L (ref 0–70)
ANION GAP SERPL CALCULATED.3IONS-SCNC: 7 MMOL/L (ref 3–14)
AST SERPL W P-5'-P-CCNC: 26 U/L (ref 0–45)
BASE EXCESS BLDA CALC-SCNC: -0.2 MMOL/L (ref -9–1.8)
BASE EXCESS BLDA CALC-SCNC: -2.7 MMOL/L (ref -9–1.8)
BASO STIPL BLD QL SMEAR: PRESENT
BILIRUB SERPL-MCNC: 1.2 MG/DL (ref 0.2–1.3)
BUN SERPL-MCNC: 53 MG/DL (ref 7–30)
CALCIUM SERPL-MCNC: 7.6 MG/DL (ref 8.5–10.1)
CHLORIDE BLD-SCNC: 106 MMOL/L (ref 94–109)
CO2 SERPL-SCNC: 23 MMOL/L (ref 20–32)
CREAT SERPL-MCNC: 1.51 MG/DL (ref 0.66–1.25)
ERYTHROCYTE [DISTWIDTH] IN BLOOD BY AUTOMATED COUNT: 18.5 % (ref 10–15)
GFR SERPL CREATININE-BSD FRML MDRD: 53 ML/MIN/1.73M2
GLUCOSE BLD-MCNC: 263 MG/DL (ref 70–99)
GLUCOSE BLDC GLUCOMTR-MCNC: 223 MG/DL (ref 70–99)
GLUCOSE BLDC GLUCOMTR-MCNC: 230 MG/DL (ref 70–99)
GLUCOSE BLDC GLUCOMTR-MCNC: 231 MG/DL (ref 70–99)
GLUCOSE BLDC GLUCOMTR-MCNC: 240 MG/DL (ref 70–99)
GLUCOSE BLDC GLUCOMTR-MCNC: 243 MG/DL (ref 70–99)
GLUCOSE BLDC GLUCOMTR-MCNC: 246 MG/DL (ref 70–99)
HCO3 BLD-SCNC: 23 MMOL/L (ref 21–28)
HCO3 BLD-SCNC: 23 MMOL/L (ref 21–28)
HCT VFR BLD AUTO: 27.4 % (ref 40–53)
HGB BLD-MCNC: 9 G/DL (ref 13.3–17.7)
HSV1 DNA SPEC QL NAA+PROBE: DETECTED
HSV2 DNA SPEC QL NAA+PROBE: NOT DETECTED
MCH RBC QN AUTO: 34.1 PG (ref 26.5–33)
MCHC RBC AUTO-ENTMCNC: 32.8 G/DL (ref 31.5–36.5)
MCV RBC AUTO: 104 FL (ref 78–100)
O2/TOTAL GAS SETTING VFR VENT: 30 %
O2/TOTAL GAS SETTING VFR VENT: 40 %
PCO2 BLD: 32 MM HG (ref 35–45)
PCO2 BLD: 43 MM HG (ref 35–45)
PH BLD: 7.34 [PH] (ref 7.35–7.45)
PH BLD: 7.47 [PH] (ref 7.35–7.45)
PLAT MORPH BLD: ABNORMAL
PLATELET # BLD AUTO: 89 10E3/UL (ref 150–450)
PO2 BLD: 100 MM HG (ref 80–105)
PO2 BLD: 99 MM HG (ref 80–105)
POTASSIUM BLD-SCNC: 4.8 MMOL/L (ref 3.4–5.3)
PROT SERPL-MCNC: 5.1 G/DL (ref 6.8–8.8)
RBC # BLD AUTO: 2.64 10E6/UL (ref 4.4–5.9)
RBC MORPH BLD: ABNORMAL
SODIUM SERPL-SCNC: 136 MMOL/L (ref 133–144)
WBC # BLD AUTO: 18.6 10E3/UL (ref 4–11)

## 2022-11-23 PROCEDURE — 250N000011 HC RX IP 250 OP 636: Performed by: SPECIALIST

## 2022-11-23 PROCEDURE — 250N000011 HC RX IP 250 OP 636: Performed by: INTERNAL MEDICINE

## 2022-11-23 PROCEDURE — 250N000013 HC RX MED GY IP 250 OP 250 PS 637: Performed by: STUDENT IN AN ORGANIZED HEALTH CARE EDUCATION/TRAINING PROGRAM

## 2022-11-23 PROCEDURE — 250N000009 HC RX 250: Performed by: INTERNAL MEDICINE

## 2022-11-23 PROCEDURE — 999N000009 HC STATISTIC AIRWAY CARE

## 2022-11-23 PROCEDURE — 258N000003 HC RX IP 258 OP 636: Performed by: INTERNAL MEDICINE

## 2022-11-23 PROCEDURE — 999N000157 HC STATISTIC RCP TIME EA 10 MIN

## 2022-11-23 PROCEDURE — 99232 SBSQ HOSP IP/OBS MODERATE 35: CPT | Performed by: INTERNAL MEDICINE

## 2022-11-23 PROCEDURE — 99233 SBSQ HOSP IP/OBS HIGH 50: CPT | Performed by: SPECIALIST

## 2022-11-23 PROCEDURE — 999N000253 HC STATISTIC WEANING TRIALS

## 2022-11-23 PROCEDURE — 94003 VENT MGMT INPAT SUBQ DAY: CPT

## 2022-11-23 PROCEDURE — 94668 MNPJ CHEST WALL SBSQ: CPT

## 2022-11-23 PROCEDURE — 200N000001 HC R&B ICU

## 2022-11-23 PROCEDURE — 94640 AIRWAY INHALATION TREATMENT: CPT

## 2022-11-23 PROCEDURE — 85027 COMPLETE CBC AUTOMATED: CPT | Performed by: INTERNAL MEDICINE

## 2022-11-23 PROCEDURE — 250N000013 HC RX MED GY IP 250 OP 250 PS 637: Performed by: SPECIALIST

## 2022-11-23 PROCEDURE — 82803 BLOOD GASES ANY COMBINATION: CPT | Performed by: INTERNAL MEDICINE

## 2022-11-23 PROCEDURE — 250N000013 HC RX MED GY IP 250 OP 250 PS 637: Performed by: INTERNAL MEDICINE

## 2022-11-23 PROCEDURE — 94640 AIRWAY INHALATION TREATMENT: CPT | Mod: 76

## 2022-11-23 PROCEDURE — 80053 COMPREHEN METABOLIC PANEL: CPT | Performed by: INTERNAL MEDICINE

## 2022-11-23 PROCEDURE — 250N000011 HC RX IP 250 OP 636: Performed by: SURGERY

## 2022-11-23 PROCEDURE — 250N000012 HC RX MED GY IP 250 OP 636 PS 637: Performed by: INTERNAL MEDICINE

## 2022-11-23 PROCEDURE — 99291 CRITICAL CARE FIRST HOUR: CPT | Performed by: INTERNAL MEDICINE

## 2022-11-23 RX ORDER — ACYCLOVIR 50 MG/G
OINTMENT TOPICAL EVERY 8 HOURS
Status: COMPLETED | OUTPATIENT
Start: 2022-11-23 | End: 2022-12-14

## 2022-11-23 RX ORDER — VALACYCLOVIR HYDROCHLORIDE 500 MG/1
500 TABLET, FILM COATED ORAL EVERY 24 HOURS
Status: COMPLETED | OUTPATIENT
Start: 2022-11-23 | End: 2022-11-24

## 2022-11-23 RX ORDER — HEPARIN SODIUM 5000 [USP'U]/.5ML
5000 INJECTION, SOLUTION INTRAVENOUS; SUBCUTANEOUS EVERY 8 HOURS
Status: DISCONTINUED | OUTPATIENT
Start: 2022-11-23 | End: 2022-12-02

## 2022-11-23 RX ORDER — NOREPINEPHRINE BITARTRATE 0.06 MG/ML
.01-.6 INJECTION, SOLUTION INTRAVENOUS CONTINUOUS
Status: DISCONTINUED | OUTPATIENT
Start: 2022-11-23 | End: 2022-12-05

## 2022-11-23 RX ADMIN — Medication 15 ML: at 08:01

## 2022-11-23 RX ADMIN — SENNOSIDES AND DOCUSATE SODIUM 1 TABLET: 50; 8.6 TABLET ORAL at 20:25

## 2022-11-23 RX ADMIN — INSULIN ASPART 5 UNITS: 100 INJECTION, SOLUTION INTRAVENOUS; SUBCUTANEOUS at 11:53

## 2022-11-23 RX ADMIN — ACETYLCYSTEINE 2 ML: 200 SOLUTION ORAL; RESPIRATORY (INHALATION) at 15:36

## 2022-11-23 RX ADMIN — Medication 50 MCG: at 06:15

## 2022-11-23 RX ADMIN — SENNOSIDES AND DOCUSATE SODIUM 1 TABLET: 50; 8.6 TABLET ORAL at 08:01

## 2022-11-23 RX ADMIN — VALACYCLOVIR HYDROCHLORIDE 500 MG: 500 TABLET, FILM COATED ORAL at 14:10

## 2022-11-23 RX ADMIN — HEPARIN SODIUM 5000 UNITS: 5000 INJECTION, SOLUTION INTRAVENOUS; SUBCUTANEOUS at 23:21

## 2022-11-23 RX ADMIN — Medication 1 DROP: at 23:21

## 2022-11-23 RX ADMIN — HYDROCORTISONE SODIUM SUCCINATE 50 MG: 100 INJECTION, POWDER, FOR SOLUTION INTRAMUSCULAR; INTRAVENOUS at 04:46

## 2022-11-23 RX ADMIN — INSULIN ASPART 5 UNITS: 100 INJECTION, SOLUTION INTRAVENOUS; SUBCUTANEOUS at 04:44

## 2022-11-23 RX ADMIN — Medication 1 DROP: at 08:06

## 2022-11-23 RX ADMIN — INSULIN ASPART 4 UNITS: 100 INJECTION, SOLUTION INTRAVENOUS; SUBCUTANEOUS at 20:34

## 2022-11-23 RX ADMIN — PHENYLEPHRINE HYDROCHLORIDE 0.75 MCG/KG/MIN: 10 INJECTION INTRAVENOUS at 16:00

## 2022-11-23 RX ADMIN — HEPARIN SODIUM 5000 UNITS: 5000 INJECTION, SOLUTION INTRAVENOUS; SUBCUTANEOUS at 14:10

## 2022-11-23 RX ADMIN — Medication 40 MG: at 08:01

## 2022-11-23 RX ADMIN — VORICONAZOLE 300 MG: 40 POWDER, FOR SUSPENSION ORAL at 20:26

## 2022-11-23 RX ADMIN — IPRATROPIUM BROMIDE AND ALBUTEROL SULFATE 3 ML: .5; 3 SOLUTION RESPIRATORY (INHALATION) at 07:08

## 2022-11-23 RX ADMIN — ACETYLCYSTEINE 2 ML: 200 SOLUTION ORAL; RESPIRATORY (INHALATION) at 19:18

## 2022-11-23 RX ADMIN — CHLORHEXIDINE GLUCONATE 0.12% ORAL RINSE 15 ML: 1.2 LIQUID ORAL at 07:43

## 2022-11-23 RX ADMIN — CEFTRIAXONE SODIUM 2 G: 2 INJECTION, POWDER, FOR SOLUTION INTRAMUSCULAR; INTRAVENOUS at 04:46

## 2022-11-23 RX ADMIN — HYDROCORTISONE SODIUM SUCCINATE 50 MG: 100 INJECTION, POWDER, FOR SOLUTION INTRAMUSCULAR; INTRAVENOUS at 16:05

## 2022-11-23 RX ADMIN — IPRATROPIUM BROMIDE AND ALBUTEROL SULFATE 3 ML: .5; 3 SOLUTION RESPIRATORY (INHALATION) at 15:36

## 2022-11-23 RX ADMIN — CHLORHEXIDINE GLUCONATE 0.12% ORAL RINSE 15 ML: 1.2 LIQUID ORAL at 20:25

## 2022-11-23 RX ADMIN — ACYCLOVIR: 50 OINTMENT TOPICAL at 14:14

## 2022-11-23 RX ADMIN — INSULIN ASPART 4 UNITS: 100 INJECTION, SOLUTION INTRAVENOUS; SUBCUTANEOUS at 00:17

## 2022-11-23 RX ADMIN — PROPOFOL 20 MCG/KG/MIN: 10 INJECTION, EMULSION INTRAVENOUS at 04:46

## 2022-11-23 RX ADMIN — HYDROCORTISONE SODIUM SUCCINATE 50 MG: 100 INJECTION, POWDER, FOR SOLUTION INTRAMUSCULAR; INTRAVENOUS at 10:06

## 2022-11-23 RX ADMIN — INSULIN ASPART 5 UNITS: 100 INJECTION, SOLUTION INTRAVENOUS; SUBCUTANEOUS at 08:05

## 2022-11-23 RX ADMIN — POLYETHYLENE GLYCOL 3350 17 G: 17 POWDER, FOR SOLUTION ORAL at 08:01

## 2022-11-23 RX ADMIN — HYDROCORTISONE SODIUM SUCCINATE 50 MG: 100 INJECTION, POWDER, FOR SOLUTION INTRAMUSCULAR; INTRAVENOUS at 23:20

## 2022-11-23 RX ADMIN — INSULIN GLARGINE 10 UNITS: 100 INJECTION, SOLUTION SUBCUTANEOUS at 11:53

## 2022-11-23 RX ADMIN — PHENYLEPHRINE HYDROCHLORIDE 0.75 MCG/KG/MIN: 10 INJECTION INTRAVENOUS at 06:12

## 2022-11-23 RX ADMIN — Medication 50 MCG: at 02:16

## 2022-11-23 RX ADMIN — Medication 50 MCG: at 00:26

## 2022-11-23 RX ADMIN — ACETYLCYSTEINE 2 ML: 200 SOLUTION ORAL; RESPIRATORY (INHALATION) at 11:11

## 2022-11-23 RX ADMIN — Medication 1 DROP: at 16:05

## 2022-11-23 RX ADMIN — INSULIN ASPART 4 UNITS: 100 INJECTION, SOLUTION INTRAVENOUS; SUBCUTANEOUS at 16:36

## 2022-11-23 RX ADMIN — ACYCLOVIR: 50 OINTMENT TOPICAL at 23:36

## 2022-11-23 RX ADMIN — IPRATROPIUM BROMIDE AND ALBUTEROL SULFATE 3 ML: .5; 3 SOLUTION RESPIRATORY (INHALATION) at 19:18

## 2022-11-23 RX ADMIN — IPRATROPIUM BROMIDE AND ALBUTEROL SULFATE 3 ML: .5; 3 SOLUTION RESPIRATORY (INHALATION) at 11:11

## 2022-11-23 RX ADMIN — ACETYLCYSTEINE 2 ML: 200 SOLUTION ORAL; RESPIRATORY (INHALATION) at 07:08

## 2022-11-23 RX ADMIN — VORICONAZOLE 300 MG: 40 POWDER, FOR SUSPENSION ORAL at 08:01

## 2022-11-23 ASSESSMENT — ACTIVITIES OF DAILY LIVING (ADL)
ADLS_ACUITY_SCORE: 51

## 2022-11-23 NOTE — PROGRESS NOTES
[facility] ICU RESPIRATORY NOTE        Date of Admission: 11/12/2022    Date of Intubation (most recent): 11/21/22    Reason for Mechanical Ventilation: Respiratory Failure    Number of Days on Mechanical Ventilation: 2    Met Criteria for Spontaneous Breathing Trial: No    Reason for No Spontaneous Breathing Trial: Per MD    Significant Events Today: None overnight    ABG Results:   Recent Labs   Lab 11/23/22  0442 11/22/22  1839 11/22/22  1545 11/22/22  0845   PH 7.47* 7.47* 7.49* 7.46*   PCO2 32* 33* 32* 33*   PO2 100 104 136* 151*   HCO3 23 24 24 24   O2PER 40 40 40 55       Current Vent Settings: Vent Mode: CMV/AC  (Continuous Mandatory Ventilation/ Assist Control)  FiO2 (%): 40 %  Resp Rate (Set): 22 breaths/min  Tidal Volume (Set, mL): 500 mL  PEEP (cm H2O): 6 cmH2O  Resp: 22      Skin Assessment: Intact    Plan: Continue full vent support with daily wean assessment    Chan Lowery, RRT on 11/23/2022 at 6:03 AM

## 2022-11-23 NOTE — PROGRESS NOTES
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 Acute respiratory failure - continued slow improvement and now at 30% and +5 PEEP; he has pneumonia and likely requires pulmonary hygiene. Continue to titrate down vent support and monitor closely.  2. Pneumonia - strep and parainfluenza; on Rocephin and voriconazole  3. ID- voriconazole for possible fungal infection per ID; aspergillus in sputum.   4. Shock - he remains on phenylephrine but down to about 0.5  4. Acute renal failure - completing CRRT and will plan HD soon.   5. S/p initial arrest and prolonged resuscitation (11.9) and hypothermia resuscitation   6. History of Liver Transplant 2016 - off tacrolimus but on steroids; Discussed with Liver Tx service and appreciate input- maintain him on at least the equivalent of prednisone 10 mgms/day. We will likely resume tacrolimus by about Monday.   7. History of HTN  8. CHRISTIAN on BIPAP  9. Nutrition enteral   10. CNS- though awake and following commands, long term CNS prognosis is guarded. MRI  11/17 looks \"worse than what we have seen from him thus far\"         Summary/Hospital Course:           Assessment and plan :     Blanco Osborne IS a 57 year old male admitted on 11/12/2022 for acute respiratory failure.   I have personally reviewed the daily labs, imaging studies, cultures and discussed the " case with referring physician and consulting physicians.     My assessment and plan by system for this patient is as follows:    Neurology/Psychiatry:   1. Comfortable on ventilator; moves extremities to command    Cardiovascular:   1.Hemodynamics - improving slowly     Pulmonary/Ventilator Management:   1. Slowly improving ventilator support     GI and Nutrition :   1. Enteral nutrition     Renal/Fluids/Electrolytes:   1. He continues to require dialysis     Infectious Disease:   1. As above, will complete course of antibiotics; discussed with Dr. Hernández today and appreciate her assistance     Endocrine:   1. Glucoses riding higher and lantus added    Hematology/Oncology:   1. Hg 9.0     IV/Access:   1. Venous access -   2. Arterial access -   3.  Plan  - central access required and necessary      ICU Prophylaxis:   1. DVT: Hep Subq/ LMWH/mechanical  2. VAP: HOB 30 degrees, chlorhexidine rinse  3. Stress Ulcer: PPI/H2 blocker  4. Restraints: Nonviolent soft two point restraints required and necessary for patient safety and continued cares and good effect as patient continues to pull at necessary lines, tubes despite education and distraction. Will readdress daily.   5. IV Access - central access required and necessary for continued patient cares  6. Feeding - enteral nutrition   7. Family Update: discussed with family at bedside today   8. Disposition - ICU care         Key goals for next 24 hours:   1. Stay the course   2.  3.               Interim History:              Key Medications:       acetylcysteine  2 mL Nebulization 4x Daily     acyclovir   Topical Q8H     artificial tears  1 drop Both Eyes TID     cefTRIAXone  2 g Intravenous Q24H     chlorhexidine  15 mL Mouth/Throat Q12H     heparin ANTICOAGULANT  5,000 Units Subcutaneous Q8H     heparin lock flush  5-20 mL Intracatheter Q24H     hydrocortisone sodium succinate PF  50 mg Intravenous Q6H     insulin aspart  1-12 Units Subcutaneous Q4H     insulin glargine   10 Units Subcutaneous QAM AC     ipratropium - albuterol 0.5 mg/2.5 mg/3 mL  3 mL Nebulization 4x daily     multivitamins w/minerals  15 mL Per Feeding Tube Daily     pantoprazole  40 mg Per Feeding Tube QAM AC    Or     pantoprazole  40 mg Intravenous QAM AC     polyethylene glycol  17 g Oral Daily     senna-docusate  1 tablet Oral BID    Or     senna-docusate  2 tablet Oral BID     sodium chloride (PF)  10-40 mL Intracatheter Q8H     valACYclovir  500 mg Oral Q24H     voriconazole  300 mg Oral or Feeding Tube Q12H [facility] (08/20)       dextrose       fentaNYL 25 mcg/hr (11/23/22 1206)     - MEDICATION INSTRUCTIONS -       - MEDICATION INSTRUCTIONS -       norepinephrine       phenylephrine       propofol Stopped (11/23/22 1414)     sodium chloride 10 mL/hr at 11/23/22 1209     sodium chloride 0 mL/hr at 11/22/22 0742     vasopressin                 Physical Examination:   Temp:  [96.8  F (36  C)-98.7  F (37.1  C)] 97.2  F (36.2  C)  Pulse:  [47-70] 56  Resp:  [11-25] 16  BP: ()/(60-67) 98/61  MAP:  [63 mmHg-89 mmHg] 63 mmHg  Arterial Line BP: ()/(47-67) 98/47  FiO2 (%):  [30 %-40 %] 30 %  SpO2:  [90 %-100 %] 95 %    Intake/Output Summary (Last 24 hours) at 11/23/2022 1507  Last data filed at 11/23/2022 1200  Gross per 24 hour   Intake 3081.52 ml   Output 65 ml   Net 3016.52 ml     Wt Readings from Last 4 Encounters:   11/23/22 97.2 kg (214 lb 4.6 oz)   10/07/19 137.5 kg (303 lb 3.2 oz)   10/04/19 131.5 kg (290 lb)   02/20/19 140.4 kg (309 lb 9.6 oz)     Arterial Line BP: ()/(47-67) 98/47  MAP:  [63 mmHg-89 mmHg] 63 mmHg  BP - Mean:  [72-80] 73  Vent Mode: CMV/AC  (Continuous Mandatory Ventilation/ Assist Control)  FiO2 (%): 30 %  Resp Rate (Set): (S) 16 breaths/min  Tidal Volume (Set, mL): 500 mL  PEEP (cm H2O): (S) 5 cmH2O  Resp: 16    Recent Labs   Lab 11/23/22  1107 11/23/22  0442 11/22/22  1839 11/22/22  1545   PH 7.34* 7.47* 7.47* 7.49*   PCO2 43 32* 33* 32*   PO2 99 100 104 136*   HCO3 23 23  24 24   O2PER 30 40 40 40       GEN: no acute distress; comfortable on vent    HEENT: head ncat, sclera anicteric, OP patent, trachea midline   PULM: unlabored synchronous with vent, clear anteriorly    CV/COR: RRR S1S2 no gallop,  No rub, no murmur  ABD: soft nontender,   EXT:  Minimal edema   warm  NEURO: as above; moves all extremities to command when light  SKIN: no obvious rash; no cyanosis or mottling   LINES: clean, dry intact         Data:   All data and imaging reviewed     ROUTINE ICU LABS (Last four results)  CMP  Recent Labs   Lab 11/23/22  1152 11/23/22  0804 11/23/22  0442 11/23/22  0441 11/22/22  1204 11/22/22  1200 11/22/22  0427 11/22/22  0423 11/21/22  2021 11/21/22 2012 11/21/22  1601 11/21/22  1150 11/21/22  0607 11/21/22  0401 11/20/22  0331 11/20/22  0326   NA  --   --   --  136  --  135  --  137  --  136  --  138  --  137   < > 137   POTASSIUM  --   --   --  4.8  --  4.4  --  4.4  --  4.5  --  4.5  --  4.6   < > 4.0   CHLORIDE  --   --   --  106  --  107  --  107  --  107  --  106  --  107   < > 106   CO2  --   --   --  23  --  21  --  25  --  21  --  21  --  27   < > 24   ANIONGAP  --   --   --  7  --  7  --  5  --  8  --  11  --  3   < > 7   * 246* 243* 263*   < > 185*   < > 168*   < > 146*   < > 120*   < > 116*   < > 172*   BUN  --   --   --  53*  --  32*  --  30  --  27  --  24  --  23   < > 28   CR  --   --   --  1.51*  --  0.95  --  0.94  --  0.81  --  0.76  --  0.78   < > 0.76   GFRESTIMATED  --   --   --  53*  --  >90  --  >90  --  >90  --  >90  --  >90   < > >90   KELLY  --   --   --  7.6*  --  7.9*  --  7.9*  --  8.2*  --  8.4*  --  8.2*   < > 8.0*   MAG  --   --   --   --   --  2.3  --  2.3  --  2.2  --  2.2  --  2.3   < > 2.2   PHOS  --   --   --   --   --  3.8  --  3.9  --  3.6  --  3.7  --  4.8*   < > 2.0*   PROTTOTAL  --   --   --  5.1*  --   --   --  5.5*  --   --   --   --   --  6.2*  --  5.5*   ALBUMIN  --   --   --  1.8*  --  2.0*  --  1.9*  --  1.9*  --  1.9*  --   2.1*   < > 1.8*   BILITOTAL  --   --   --  1.2  --   --   --  1.5*  --   --   --   --   --  1.4*  --  1.4*   ALKPHOS  --   --   --  201*  --   --   --  198*  --   --   --   --   --  209*  --  217*   AST  --   --   --  26  --   --   --  40  --   --   --   --   --  36  --  33   ALT  --   --   --  31  --   --   --  40  --   --   --   --   --  42  --  38    < > = values in this interval not displayed.     CBC  Recent Labs   Lab 11/23/22  0441 11/22/22  1200 11/22/22  0423 11/22/22  0025   WBC 18.6* 37.9* 42.8* 47.0*   RBC 2.64* 3.04* 2.84* 3.06*   HGB 9.0* 10.2* 9.6* 10.5*   HCT 27.4* 31.6* 29.9* 32.6*   * 104* 105* 107*   MCH 34.1* 33.6* 33.8* 34.3*   MCHC 32.8 32.3 32.1 32.2   RDW 18.5* 18.6* 18.3* 18.5*   PLT 89* 190 191 229     INRNo lab results found in last 7 days.  Arterial Blood Gas  Recent Labs   Lab 11/23/22  1107 11/23/22  0442 11/22/22  1839 11/22/22  1545   PH 7.34* 7.47* 7.47* 7.49*   PCO2 43 32* 33* 32*   PO2 99 100 104 136*   HCO3 23 23 24 24   O2PER 30 40 40 40       All cultures:  No results for input(s): CULT in the last 168 hours.  No results found for this or any previous visit (from the past 24 hour(s)).    MD Jessica    Billing: This patient is critically ill: Yes. Total critical care time today 35 min.

## 2022-11-23 NOTE — PROGRESS NOTES
Infection Prevention Note: patient tested positive for Herpes Simplex 1 from mouth lesion, this is a cold sore.  Please follow standard precautions and wear gloves when providing cares to the oral/mouth region.  Marilynn Castillo, Infection Prevention on 11/23/2022 at 10:22 AM

## 2022-11-23 NOTE — PROGRESS NOTES
CLINICAL NUTRITION SERVICES - REASSESSMENT NOTE      Recommendations Ordered by Registered Dietitian (RD): Increase TF slightly to compensate for VFEND administration (and TF hold)  Promote at 90 mL/hr x 20 hours = 1800 kcal (21 kcal/kg), 112 g protein (1.3 g/kg), 234 g CHO, 0 g fiber, 1510 mL H2O  Total with Propofol = 1882 kcal (22 kcal/kg)   Malnutrition: (11/14)  % Weight Loss:  Weight loss does not meet criteria for malnutrition - intentional weight loss over past >1 year   % Intake:  Decreased intake does not meet criteria for malnutrition - intentional calorie deficit   Subcutaneous Fat Loss:  None observed  Muscle Loss:  None observed  Fluid Retention:  Mild      Malnutrition Diagnosis: Patient does not meet two of the above criteria necessary for diagnosing malnutrition       EVALUATION OF PROGRESS TOWARD GOALS   Diet:  NPO on vent     Nutrition Support:  Patient's TF resumed on 11/21, advanced to goal on 11/22, and continues as follows ~    Nutrition Support Enteral:  Type of Feeding Tube: OG  Enteral Frequency:  Continuous  Enteral Regimen: Promote at 80 mL/hr x 20 hours (holding for VFEND 1 hour before and 1 hour after administration (getting BID))  Total Enteral Provisions: 1600 kcal (19 kcal/kg and 93% needs), 99 g protein (1.15 g/kg), 208 g CHO, 0 g fiber, 1342 mL H2O  Free Water Flush: 30 mL every 4 hours   Propofol at 3.1 mL/hr= 82 kcal   Total = 1682 kcal (20 kcal/kg and 98% needs)      Intake/Tolerance:    K/Mg/Phos normal  -263 with High sliding scale insulin, Lantus added today   Last BM on 11/21  I/O 3006/3859, wt 99.6 kg (down from admit) - likely fluids d/t CRRT       ASSESSED NUTRITION NEEDS:  Dosing Weight 86 kg (adjusted - using admit wt of 101 kg and IBW 81 kg)  Estimated Energy Needs: 6514-8494 kcals (20-25 Kcal/Kg)  Justification: overweight and vented   Estimated Protein Needs: 100-130 grams protein (1.2-1.5 g pro/Kg)  Justification: hypercatabolism with critical illness and  preservation of lean body mass      NEW FINDINGS:   11/21:  Re-intubated   11/21:  OG placed   11/22:  CRRT d/c'd     VFEND started on 11/21 - receiving BID     11/22:  WOCN= Pressure Injury Location: [facility]/[place]   Wound type: Pressure Injury   Pressure Injury Stage: Deep Tissue Pressure Injury (DTPI), hospital acquired   This is a Medical Device Related Pressure Injury (MDRPI) due to ETT       Wound location: Right nose   Wound due to: Unknown Etiology- possible herpetic lesion       Pressure Injury Location: Bilateral buttock   Wound type: Pressure Injury and Friction   Pressure Injury Stage: 1, hospital acquired       Wound location: mid upper lip   Wound due to: Suspect trauma, or could possibly be hereptic lesion, testing requested   STATUS: initial assessment       Wound location: Right upper chin       Last photo: 11/22/22   Wound due to: Unknown Etiology   STATUS: initial assessment    Patient receiving Certavite daily per Pressure Injury Protocol     Previous Goals (11/21):   TF + Propofol will meet % needs  Evaluation: Met    Previous Nutrition Diagnosis (11/21):   Inadequate protein-energy intake related to NPO, now re-intubated with plans to resume TF today as evidenced by meeting 0% protein and 5% energy needs from Propofol   Evaluation: Improving      CURRENT NUTRITION DIAGNOSIS  No nutrition diagnosis identified at this time    INTERVENTIONS  Recommendations / Nutrition Prescription  Increase TF slightly to compensate for VFEND administration (and TF hold)  Promote at 90 mL/hr x 20 hours = 1800 kcal (21 kcal/kg), 112 g protein (1.3 g/kg), 234 g CHO, 0 g fiber, 1510 mL H2O  Total with Propofol = 1882 kcal (22 kcal/kg)    Implementation  EN Composition:  Orders written to increase TF as above   Collaboration and Referral of Nutrition care:  Patient discussed today during interdisciplinary bedside rounds     Goals  TF + Propofol will meet % needs     MONITORING AND  EVALUATION:  Progress towards goals will be monitored and evaluated per protocol and Practice Guidelines    Martha Sandoval RD, LD, CNSC   Clinical Dietitian - [facility]

## 2022-11-23 NOTE — PROGRESS NOTES
[facility] ICU RESPIRATORY NOTE        Date of Admission: 11/12/2022    Date of Intubation (most recent): 11/21/2022.    Reason for Mechanical Ventilation: Respiratory Failure    Number of Days on Mechanical Ventilation: Day 3    Met Criteria for Spontaneous Breathing Trial: No    Reason for No Spontaneous Breathing Trial: Per MD    Significant Events Today: Decreased ventilatory rate to 16/min, decreased PEEP from 6 to 5 cm H2O.    ABG Results:   Recent Labs   Lab 11/23/22  1107 11/23/22  0442 11/22/22  1839 11/22/22  1545   PH 7.34* 7.47* 7.47* 7.49*   PCO2 43 32* 33* 32*   PO2 99 100 104 136*   HCO3 23 23 24 24   O2PER 30 40 40 40       Current Vent Settings: Vent Mode: (S) CPAP/PS  (Continuous positive airway pressure with Pressure Support)  FiO2 (%): 30 %  Resp Rate (Set): 16 breaths/min  Tidal Volume (Set, mL): 500 mL  PEEP (cm H2O): 5 cmH2O  Pressure Support (cm H2O): 5 cmH2O  Resp: 21      Plan: Continue to monitor patient on full ventilatory support overnight. Continue Volera treatments with Duoneb and mucomyst.     Moises Martinez, RRT on 11/23/2022 at 5:34 PM

## 2022-11-23 NOTE — PLAN OF CARE
Goal Outcome Evaluation: Care from 1900-0315    Neuro: Pt remains neurologically unchanged, SOTO, follows simple commands. Sedated with propofol.  CV/VS: SB 40-50's. Yrn titrated as able to maintain MAP >65. All other VSS.   Respiratory: CMV 40%/22/500/6+. Small amounts of thick secretions.   GI/: Anuric, no BM this shift.   Skin: Sores/ulcers to nares and upper lip remain, dressing to sacral wound CDI.   Pain: On fentanyl gtt, boluses from pump given as needed.  Labs: To be drawn in AM  Activity: Bedrest, repositioned Q1-2H.  Diet:  Tolerating TF at goal, continues to have high residuals 200-275mL.

## 2022-11-23 NOTE — PLAN OF CARE
Pt nods to questions and follows simple commands. Sleeping between cares. Nods yes to having pain when aroused. Fentanyl continuous and PRN doses given. CRRT discontinued at 1620 due to filter clotting. Bladder scan showed large amount however straight cath only produced 5ml. Cont to monitor bladder scans. Yrn weaned to 1mcg/kg/min. Vent setting changed to peep of 6 from 8 this shift. 40% FiO2. No calls from family this shift. Cont to monitor.

## 2022-11-23 NOTE — PROGRESS NOTES
Renal Medicine Progress Note            Assessment/Plan:     Blanco Osborne is a 57-year-old male with PMH CARRILLO s/p liver transplant (9/2016) and cirrhosis admitted 11/12/2022 with out of hospital PEA arrest and acute hypoxemic respiratory failure.  Course complicated by parainfluenza virus, streptococcal bacteremia, and LORETTA requiring CRRT.      # Severe anuric LORETTA:                -CRRT stopped 11/22     # Septic shock with MODS:                -Phenylephrine               -Rocephin/voriconazole     # Respiratory failure: Re-intubated 11/21g. CXR personally reviewed. 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 Electrolytes are okay.      Plan:  # No indication for RRT today. ICU does not think he needs fluid removal. Will evaluate daily for RRT. I discussed the plan with the ICU during round. I discussed the plan with his wife and sister at the bedside.          Interval History:     Afebrile.   FIO at 305  Phenylephrine at 0.75 mcg  Pt is awaiting and following commands  CRRT stopped ~ 1800 last night.             Medications and Allergies:       acetylcysteine  2 mL Nebulization 4x Daily     acyclovir   Topical Q8H     artificial tears  1 drop Both Eyes TID     cefTRIAXone  2 g Intravenous Q24H     chlorhexidine  15 mL Mouth/Throat Q12H     heparin lock flush  5-20 mL Intracatheter Q24H     hydrocortisone sodium succinate PF  50 mg Intravenous Q6H     insulin aspart  1-12 Units Subcutaneous Q4H     ipratropium - albuterol 0.5 mg/2.5 mg/3 mL  3 mL Nebulization 4x daily     multivitamins w/minerals  15 mL Per Feeding Tube Daily     pantoprazole  40 mg Per Feeding Tube QAM AC    Or     pantoprazole  40 mg Intravenous QAM AC     polyethylene glycol  17 g Oral Daily     senna-docusate  1 tablet Oral BID    Or     senna-docusate   2 tablet Oral BID     sodium chloride (PF)  10-40 mL Intracatheter Q8H     voriconazole  300 mg Oral or Feeding Tube Q12H [facility] (08/20)      No Active Allergies         Physical Exam:   Vitals were reviewed   , Blood pressure 101/60, pulse 68, temperature 97.9  F (36.6  C), temperature source Axillary, resp. rate 16, height 1.829 m (6'), weight 97.2 kg (214 lb 4.6 oz), SpO2 98 %.    Wt Readings from Last 3 Encounters:   11/23/22 97.2 kg (214 lb 4.6 oz)   10/07/19 137.5 kg (303 lb 3.2 oz)   10/04/19 131.5 kg (290 lb)       Intake/Output Summary (Last 24 hours) at 11/23/2022 1051  Last data filed at 11/23/2022 1000  Gross per 24 hour   Intake 3557.34 ml   Output 1598 ml   Net 1959.34 ml     GENERAL APPEARANCE: Critically ill  HEENT:  Intubated.   RESP: NO wheezes. Not coarse.  CV: RRR, nl S1/S2  ABDOMEN: distended, soft.   EXTREMITIES/SKIN: not much edema  NEURO: awake and following commands  RIJ temp cvc          Data:     CBC RESULTS:     Recent Labs   Lab 11/23/22  0441 11/22/22  1200 11/22/22  0423 11/22/22  0025 11/21/22 2012 11/21/22  1150   WBC 18.6* 37.9* 42.8* 47.0* 48.6* 52.4*   RBC 2.64* 3.04* 2.84* 3.06* 2.98* 3.01*   HGB 9.0* 10.2* 9.6* 10.5* 10.0* 10.0*   HCT 27.4* 31.6* 29.9* 32.6* 31.8* 32.4*   PLT 89* 190 191 229 267 222       Basic Metabolic Panel:  Recent Labs   Lab 11/23/22  0804 11/23/22  0442 11/23/22  0441 11/23/22  0016 11/22/22  2001 11/22/22  1550 11/22/22  1204 11/22/22  1200 11/22/22  0427 11/22/22  0423 11/21/22 2021 11/21/22 2012 11/21/22  1601 11/21/22  1150 11/21/22  0607 11/21/22  0401   NA  --   --  136  --   --   --   --  135  --  137  --  136  --  138  --  137   POTASSIUM  --   --  4.8  --   --   --   --  4.4  --  4.4  --  4.5  --  4.5  --  4.6   CHLORIDE  --   --  106  --   --   --   --  107  --  107  --  107  --  106  --  107   CO2  --   --  23  --   --   --   --  21  --  25  --  21  --  21  --  27   BUN  --   --  53*  --   --   --   --  32*  --  30  --  27  --  24  --  23   CR   --   --  1.51*  --   --   --   --  0.95  --  0.94  --  0.81  --  0.76  --  0.78   * 243* 263* 231* 189* 199*   < > 185*   < > 168*   < > 146*   < > 120*   < > 116*   KELLY  --   --  7.6*  --   --   --   --  7.9*  --  7.9*  --  8.2*  --  8.4*  --  8.2*    < > = values in this interval not displayed.       INRNo lab results found in last 7 days.   Attestation:   I have reviewed today's relevant vital signs, notes, medications, labs and imaging.    Zenon Bradshaw MD  [facility] - Nephrology  Office phone :[phone]  Pager: [phone]

## 2022-11-23 NOTE — PROGRESS NOTES
[facility]    Infectious Disease Progress Note    Date of Service : 11/23/2022     Assessment:  57 YM with hx of liver transplantation who is hopsitalzied with out of hospital PEA cardiac arrest with 20 min CPR and found to have pneumococcal pneumonia with ARDS and sepsis superimposed on  Parainfluenza pneumonia. He is in septic shock with multiorgan failure with acute hypoxic respiratory failure requiring mechanical ventilation, LORETTA requiring CRRT and is hypothermic. Course has been complicated by barotrauma with right pneumothorax requiring chest tube No clinical meningitis.     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 On Tacrolimus  -LORETTA on CRRT  -Thrombocytopenia related to sepsis improving  -anemia     Recommendations  1. Continue Ceftriaxone 2 grams daily -plan treatment for 2 weeks for bacteremia/sepsis until 11/27  2.  Oral Voriconazole Q12H . Check level on Friday  3.  Valtrex renal dose for herpes labialis  4. Could resume tacrolimus by next week as infection is better controlled  Supportive care  Recommendations were discussed with the critical care team    Tori Hernández MD    Interval History   Remains sedated and intubated    Physical Exam   Temp: 97.9  F (36.6  C) Temp src: Axillary BP: 101/60 Pulse: 56   Resp: 21 SpO2: 90 % O2 Device: Mechanical Ventilator    Vitals:    11/21/22 0600 11/22/22 0200 11/23/22 0200   Weight: 102.2 kg (225 lb 5 oz) 99.6 kg  (219 lb 9.3 oz) 97.2 kg (214 lb 4.6 oz)     Vital Signs with Ranges  Temp:  [96.8  F (36  C)-98.8  F (37.1  C)] 97.9  F (36.6  C)  Pulse:  [45-65] 56  Resp:  [17-29] 21  BP: (101)/(60) 101/60  MAP:  [62 mmHg-89 mmHg] 79 mmHg  Arterial Line BP: ()/(47-67) 117/62  FiO2 (%):  [40 %-50 %] 40 %  SpO2:  [90 %-100 %] 90 %    Constitutional: intubated  Lungs: anterior auscultation clear, right sided chest tube  Cardiovascular: S1S2  Abdomen: soft  Skin: Herpes labialis    Other:    Medications     dextrose       CRRT replacement solution 5,000 mL (11/22/22 1034)     fentaNYL 25 mcg/hr (11/23/22 0817)     heparin 500 Units/hr (11/23/22 0817)     - MEDICATION INSTRUCTIONS -       - MEDICATION INSTRUCTIONS -       norepinephrine Stopped (11/21/22 1320)     phenylephrine 0.75 mcg/kg/min (11/23/22 0817)     CRRT replacement solution 200 mL/hr at 11/22/22 0826     CRRT replacement solution 5,000 mL (11/22/22 1440)     propofol 20 mcg/kg/min (11/23/22 0817)     sodium chloride 10 mL/hr (11/22/22 2000)     sodium chloride 0 mL/hr at 11/22/22 0742     vasopressin         acetylcysteine  2 mL Nebulization 4x Daily     artificial tears  1 drop Both Eyes TID     cefTRIAXone  2 g Intravenous Q24H     chlorhexidine  15 mL Mouth/Throat Q12H     heparin lock flush  5-20 mL Intracatheter Q24H     hydrocortisone sodium succinate PF  50 mg Intravenous Q6H     insulin aspart  1-12 Units Subcutaneous Q4H     ipratropium - albuterol 0.5 mg/2.5 mg/3 mL  3 mL Nebulization 4x daily     multivitamins w/minerals  15 mL Per Feeding Tube Daily     pantoprazole  40 mg Per Feeding Tube QAM AC    Or     pantoprazole  40 mg Intravenous QAM AC     polyethylene glycol  17 g Oral Daily     senna-docusate  1 tablet Oral BID    Or     senna-docusate  2 tablet Oral BID     sodium chloride (PF)  10-40 mL Intracatheter Q8H     voriconazole  300 mg Oral or Feeding Tube Q12H [facility] (08/20)       Data   All microbiology laboratory data reviewed.  Recent Labs   Lab  Test 11/23/22  0441 11/22/22  1200 11/22/22  0423   WBC 18.6* 37.9* 42.8*   HGB 9.0* 10.2* 9.6*   HCT 27.4* 31.6* 29.9*   * 104* 105*   PLT 89* 190 191     Recent Labs   Lab Test 11/23/22  0441 11/22/22  1200 11/22/22  0423   CR 1.51* 0.95 0.94     Component      Latest Ref Rng & Units 11/22/2022   HSV Type 1 PCR      Not Detected Detected (A)   HSV Type 2 PCR      Not Detected Not Detected     Microbiology  11/15 ET tube  Endotracheal; Sputum    0 Result Notes  Culture 1+ Aspergillus terreus Abnormal                11/12 blood cx  Peripheral Blood    0 Result Notes  Culture Positive on the 1st day of incubation Abnormal         Streptococcus pneumoniae Panic     2 of 2 bottles  Sent to [facility] for serotyping, report to follow.         Resulting Agency: [facility]      Susceptibility                   Streptococcus pneumoniae       KARAN       Ceftriaxone (meningitis) 0.016 ug/mL Susceptible       Ceftriaxone (non-meningitis) 0.016 ug/mL Susceptible       Levofloxacin 1.5 ug/mL Susceptible       Meropenem 0.012 ug/mL Susceptible       Penicillin (meningitis) 0.016 ug/mL Susceptible       Penicillin (non-meningitis) 0.016 ug/mL Susceptible       Penicillin(oral) 0.016 ug/mL Susceptible       Vancomycin 0.50 ug/mL Susceptible                       Imaging  11/17 MRI brain  EXAM: MR BRAIN W/O and W CONTRAST  LOCATION: [facility]  DATE/TIME: 11/17/2022 6:58 PM     INDICATION: Evaluate for anoxic brain injury following cardiac arrest.  COMPARISON: Head CT 11/12/2022 and 11/16/2022.  CONTRAST: 8.5 mL Gadavist  TECHNIQUE: Routine multiplanar multisequence head MRI without and with intravenous contrast.     FINDINGS:  INTRACRANIAL CONTENTS: Multifocal areas of restricted diffusion are identified intracranially with cortical/subcortical involvement of the paramedian posterior right parietal level, deep white matter posterior right frontal corona radiata, anterior   subcortical  white matter left frontal lobe, and left periventricular temporooccipital junction deep white matter, with restricted diffusion, corresponding low ADC map value and fairly concordant FLAIR hyperintense signal abnormality. No evidence for   hemorrhagic transformation. These areas of ischemic involvement are better defined than previous CT due to modality/diffusion-weighted imaging. Multifocal distribution suggests possible central thromboembolic etiology but is nonspecific. There is no mass   effect, sulcal effacement or ventricular effacement present. No additional restricted diffusion abnormality. Otherwise parenchymal signal is satisfactory for age. Mild to moderate generalized cerebral atrophy. No hydrocephalus. Normal position of the   cerebellar tonsils. Postcontrast imaging shows no corresponding enhancement abnormality of the areas of restricted diffusion. No pathologic enhancement is present. No abnormal intraparenchymal leptomeningeal or dural enhancement. Dural venous sinus   enhancement is satisfactory. Meckel's cave and cavernous sinus enhancement is normal. No pathologic orbital enhancement.     SELLA: No abnormality accounting for technique.     OSSEOUS STRUCTURES/SOFT TISSUES: No evidence for marrow infiltrative process. No diploic space, skull base/clivus or upper cervical marrow signal abnormality is noted. Patient is nasally and orally intubated. The major intracranial vascular flow voids   are maintained.      ORBITS: No abnormality accounting for technique. No pathologic orbital enhancement.     SINUSES/MASTOIDS: Air-fluid levels in the maxillary sinuses with partial opacification of the ethmoid air cells and sphenoid sinus. Fluid opacification in the nasopharynx related to patient's intubated status presumed. Complete/near complete   opacification of the mastoid air cells bilaterally. No apparent mass in the posterior nasopharynx or skull base.                                                                        IMPRESSION:  1.  Multifocal punctate and confluent areas of nonhemorrhagic acute to subacute ischemic change with concordant FLAIR hyperintensity as above. No corresponding enhancement abnormality. No mass effect, sulcal effacement or midline shift. No ventricular   effacement     2.  Cortical/subcortical involvement is identified right parietal level with punctate involvement noted elsewhere deep white matter posterior right frontal level, left frontal subcortical white matter and left periventricular temporal level.     3.  No pathologic enhancement is noted intracranially     4.  Satisfactory ventricular caliber     5.  Opacification mastoid air cells bilaterally. Air-fluid level paranasal sinuses. Patient is nasally and orally intubated on today's examination.

## 2022-11-24 LAB
ALBUMIN SERPL-MCNC: 1.7 G/DL (ref 3.4–5)
ALBUMIN SERPL-MCNC: 1.9 G/DL (ref 3.4–5)
ANION GAP SERPL CALCULATED.3IONS-SCNC: 6 MMOL/L (ref 3–14)
ANION GAP SERPL CALCULATED.3IONS-SCNC: 6 MMOL/L (ref 3–14)
ANION GAP SERPL CALCULATED.3IONS-SCNC: 8 MMOL/L (ref 3–14)
BUN SERPL-MCNC: 74 MG/DL (ref 7–30)
BUN SERPL-MCNC: 76 MG/DL (ref 7–30)
BUN SERPL-MCNC: 83 MG/DL (ref 7–30)
CA-I BLD-MCNC: 4.8 MG/DL (ref 4.4–5.2)
CALCIUM SERPL-MCNC: 7.7 MG/DL (ref 8.5–10.1)
CALCIUM SERPL-MCNC: 8 MG/DL (ref 8.5–10.1)
CALCIUM SERPL-MCNC: 8.2 MG/DL (ref 8.5–10.1)
CHLORIDE BLD-SCNC: 105 MMOL/L (ref 94–109)
CO2 SERPL-SCNC: 24 MMOL/L (ref 20–32)
CO2 SERPL-SCNC: 24 MMOL/L (ref 20–32)
CO2 SERPL-SCNC: 25 MMOL/L (ref 20–32)
CREAT SERPL-MCNC: 2.3 MG/DL (ref 0.66–1.25)
CREAT SERPL-MCNC: 2.38 MG/DL (ref 0.66–1.25)
CREAT SERPL-MCNC: 2.64 MG/DL (ref 0.66–1.25)
ERYTHROCYTE [DISTWIDTH] IN BLOOD BY AUTOMATED COUNT: 18.5 % (ref 10–15)
GFR SERPL CREATININE-BSD FRML MDRD: 27 ML/MIN/1.73M2
GFR SERPL CREATININE-BSD FRML MDRD: 31 ML/MIN/1.73M2
GFR SERPL CREATININE-BSD FRML MDRD: 32 ML/MIN/1.73M2
GLUCOSE BLD-MCNC: 143 MG/DL (ref 70–99)
GLUCOSE BLD-MCNC: 159 MG/DL (ref 70–99)
GLUCOSE BLD-MCNC: 165 MG/DL (ref 70–99)
GLUCOSE BLDC GLUCOMTR-MCNC: 120 MG/DL (ref 70–99)
GLUCOSE BLDC GLUCOMTR-MCNC: 122 MG/DL (ref 70–99)
GLUCOSE BLDC GLUCOMTR-MCNC: 128 MG/DL (ref 70–99)
GLUCOSE BLDC GLUCOMTR-MCNC: 135 MG/DL (ref 70–99)
GLUCOSE BLDC GLUCOMTR-MCNC: 135 MG/DL (ref 70–99)
GLUCOSE BLDC GLUCOMTR-MCNC: 145 MG/DL (ref 70–99)
GLUCOSE BLDC GLUCOMTR-MCNC: 145 MG/DL (ref 70–99)
GLUCOSE BLDC GLUCOMTR-MCNC: 146 MG/DL (ref 70–99)
GLUCOSE BLDC GLUCOMTR-MCNC: 151 MG/DL (ref 70–99)
GLUCOSE BLDC GLUCOMTR-MCNC: 153 MG/DL (ref 70–99)
GLUCOSE BLDC GLUCOMTR-MCNC: 175 MG/DL (ref 70–99)
GLUCOSE BLDC GLUCOMTR-MCNC: 186 MG/DL (ref 70–99)
HCT VFR BLD AUTO: 27.8 % (ref 40–53)
HGB BLD-MCNC: 8.9 G/DL (ref 13.3–17.7)
MAGNESIUM SERPL-MCNC: 2.3 MG/DL (ref 1.6–2.3)
MCH RBC QN AUTO: 33.8 PG (ref 26.5–33)
MCHC RBC AUTO-ENTMCNC: 32 G/DL (ref 31.5–36.5)
MCV RBC AUTO: 106 FL (ref 78–100)
PHOSPHATE SERPL-MCNC: 5.9 MG/DL (ref 2.5–4.5)
PHOSPHATE SERPL-MCNC: 6.7 MG/DL (ref 2.5–4.5)
PLATELET # BLD AUTO: 84 10E3/UL (ref 150–450)
POTASSIUM BLD-SCNC: 5.2 MMOL/L (ref 3.4–5.3)
POTASSIUM BLD-SCNC: 5.7 MMOL/L (ref 3.4–5.3)
POTASSIUM BLD-SCNC: 5.8 MMOL/L (ref 3.4–5.3)
RBC # BLD AUTO: 2.63 10E6/UL (ref 4.4–5.9)
SODIUM SERPL-SCNC: 135 MMOL/L (ref 133–144)
SODIUM SERPL-SCNC: 135 MMOL/L (ref 133–144)
SODIUM SERPL-SCNC: 138 MMOL/L (ref 133–144)
WBC # BLD AUTO: 16.8 10E3/UL (ref 4–11)

## 2022-11-24 PROCEDURE — 999N000157 HC STATISTIC RCP TIME EA 10 MIN

## 2022-11-24 PROCEDURE — 999N000259 HC STATISTIC EXTUBATION

## 2022-11-24 PROCEDURE — 250N000011 HC RX IP 250 OP 636: Performed by: INTERNAL MEDICINE

## 2022-11-24 PROCEDURE — 250N000013 HC RX MED GY IP 250 OP 250 PS 637: Performed by: SPECIALIST

## 2022-11-24 PROCEDURE — 200N000001 HC R&B ICU

## 2022-11-24 PROCEDURE — 250N000009 HC RX 250: Performed by: STUDENT IN AN ORGANIZED HEALTH CARE EDUCATION/TRAINING PROGRAM

## 2022-11-24 PROCEDURE — 82330 ASSAY OF CALCIUM: CPT | Performed by: INTERNAL MEDICINE

## 2022-11-24 PROCEDURE — 94640 AIRWAY INHALATION TREATMENT: CPT | Mod: 76

## 2022-11-24 PROCEDURE — 80069 RENAL FUNCTION PANEL: CPT | Performed by: INTERNAL MEDICINE

## 2022-11-24 PROCEDURE — 94003 VENT MGMT INPAT SUBQ DAY: CPT

## 2022-11-24 PROCEDURE — 99291 CRITICAL CARE FIRST HOUR: CPT | Performed by: INTERNAL MEDICINE

## 2022-11-24 PROCEDURE — 999N000253 HC STATISTIC WEANING TRIALS

## 2022-11-24 PROCEDURE — 250N000009 HC RX 250: Performed by: INTERNAL MEDICINE

## 2022-11-24 PROCEDURE — 999N000009 HC STATISTIC AIRWAY CARE

## 2022-11-24 PROCEDURE — 94799 UNLISTED PULMONARY SVC/PX: CPT

## 2022-11-24 PROCEDURE — 250N000013 HC RX MED GY IP 250 OP 250 PS 637: Performed by: INTERNAL MEDICINE

## 2022-11-24 PROCEDURE — 99233 SBSQ HOSP IP/OBS HIGH 50: CPT | Performed by: INTERNAL MEDICINE

## 2022-11-24 PROCEDURE — 250N000011 HC RX IP 250 OP 636

## 2022-11-24 PROCEDURE — 258N000001 HC RX 258: Performed by: INTERNAL MEDICINE

## 2022-11-24 PROCEDURE — 250N000013 HC RX MED GY IP 250 OP 250 PS 637: Performed by: STUDENT IN AN ORGANIZED HEALTH CARE EDUCATION/TRAINING PROGRAM

## 2022-11-24 PROCEDURE — 94640 AIRWAY INHALATION TREATMENT: CPT

## 2022-11-24 PROCEDURE — 258N000003 HC RX IP 258 OP 636: Performed by: INTERNAL MEDICINE

## 2022-11-24 PROCEDURE — 85027 COMPLETE CBC AUTOMATED: CPT | Performed by: INTERNAL MEDICINE

## 2022-11-24 PROCEDURE — 258N000003 HC RX IP 258 OP 636

## 2022-11-24 PROCEDURE — 250N000011 HC RX IP 250 OP 636: Performed by: SPECIALIST

## 2022-11-24 PROCEDURE — 83735 ASSAY OF MAGNESIUM: CPT | Performed by: INTERNAL MEDICINE

## 2022-11-24 PROCEDURE — 250N000012 HC RX MED GY IP 250 OP 636 PS 637: Performed by: INTERNAL MEDICINE

## 2022-11-24 RX ORDER — CALCIUM GLUCONATE 20 MG/ML
1 INJECTION, SOLUTION INTRAVENOUS ONCE
Status: COMPLETED | OUTPATIENT
Start: 2022-11-24 | End: 2022-11-24

## 2022-11-24 RX ORDER — FUROSEMIDE 10 MG/ML
80 INJECTION INTRAMUSCULAR; INTRAVENOUS ONCE
Status: COMPLETED | OUTPATIENT
Start: 2022-11-24 | End: 2022-11-24

## 2022-11-24 RX ORDER — CALCIUM CHLORIDE, MAGNESIUM CHLORIDE, DEXTROSE MONOHYDRATE, LACTIC ACID, SODIUM CHLORIDE, SODIUM BICARBONATE AND POTASSIUM CHLORIDE 5.15; 2.03; 22; 5.4; 6.46; 3.09; .157 G/L; G/L; G/L; G/L; G/L; G/L; G/L
INJECTION INTRAVENOUS CONTINUOUS
Status: DISCONTINUED | OUTPATIENT
Start: 2022-11-24 | End: 2022-11-29

## 2022-11-24 RX ORDER — POTASSIUM CHLORIDE 29.8 MG/ML
20 INJECTION INTRAVENOUS EVERY 8 HOURS PRN
Status: DISCONTINUED | OUTPATIENT
Start: 2022-11-24 | End: 2022-11-29

## 2022-11-24 RX ORDER — DEXTROSE MONOHYDRATE 25 G/50ML
50 INJECTION, SOLUTION INTRAVENOUS ONCE
Status: COMPLETED | OUTPATIENT
Start: 2022-11-24 | End: 2022-11-24

## 2022-11-24 RX ORDER — CALCIUM GLUCONATE 20 MG/ML
2 INJECTION, SOLUTION INTRAVENOUS EVERY 8 HOURS PRN
Status: DISCONTINUED | OUTPATIENT
Start: 2022-11-24 | End: 2022-11-29

## 2022-11-24 RX ORDER — MAGNESIUM SULFATE HEPTAHYDRATE 40 MG/ML
2 INJECTION, SOLUTION INTRAVENOUS EVERY 8 HOURS PRN
Status: DISCONTINUED | OUTPATIENT
Start: 2022-11-24 | End: 2022-11-29

## 2022-11-24 RX ADMIN — CALCIUM GLUCONATE 1 G: 20 INJECTION, SOLUTION INTRAVENOUS at 10:33

## 2022-11-24 RX ADMIN — POLYETHYLENE GLYCOL 3350 17 G: 17 POWDER, FOR SOLUTION ORAL at 09:05

## 2022-11-24 RX ADMIN — FUROSEMIDE 80 MG: 10 INJECTION, SOLUTION INTRAMUSCULAR; INTRAVENOUS at 10:08

## 2022-11-24 RX ADMIN — IPRATROPIUM BROMIDE AND ALBUTEROL SULFATE 3 ML: .5; 3 SOLUTION RESPIRATORY (INHALATION) at 20:48

## 2022-11-24 RX ADMIN — Medication 40 MG: at 09:04

## 2022-11-24 RX ADMIN — IPRATROPIUM BROMIDE AND ALBUTEROL SULFATE 3 ML: .5; 3 SOLUTION RESPIRATORY (INHALATION) at 07:47

## 2022-11-24 RX ADMIN — HEPARIN SODIUM 5000 UNITS: 5000 INJECTION, SOLUTION INTRAVENOUS; SUBCUTANEOUS at 14:09

## 2022-11-24 RX ADMIN — IPRATROPIUM BROMIDE AND ALBUTEROL SULFATE 3 ML: .5; 3 SOLUTION RESPIRATORY (INHALATION) at 15:06

## 2022-11-24 RX ADMIN — INSULIN ASPART 1 UNITS: 100 INJECTION, SOLUTION INTRAVENOUS; SUBCUTANEOUS at 08:29

## 2022-11-24 RX ADMIN — CEFTRIAXONE SODIUM 2 G: 2 INJECTION, POWDER, FOR SOLUTION INTRAMUSCULAR; INTRAVENOUS at 04:13

## 2022-11-24 RX ADMIN — Medication 15 ML: at 09:04

## 2022-11-24 RX ADMIN — INSULIN GLARGINE 10 UNITS: 100 INJECTION, SOLUTION SUBCUTANEOUS at 08:29

## 2022-11-24 RX ADMIN — VORICONAZOLE 300 MG: 40 POWDER, FOR SUSPENSION ORAL at 09:27

## 2022-11-24 RX ADMIN — VALACYCLOVIR HYDROCHLORIDE 500 MG: 500 TABLET, FILM COATED ORAL at 13:55

## 2022-11-24 RX ADMIN — METOPROLOL TARTRATE 5 MG: 5 INJECTION INTRAVENOUS at 20:08

## 2022-11-24 RX ADMIN — CHLORHEXIDINE GLUCONATE 0.12% ORAL RINSE 15 ML: 1.2 LIQUID ORAL at 08:30

## 2022-11-24 RX ADMIN — ACETYLCYSTEINE 2 ML: 200 SOLUTION ORAL; RESPIRATORY (INHALATION) at 11:31

## 2022-11-24 RX ADMIN — INSULIN ASPART 1 UNITS: 100 INJECTION, SOLUTION INTRAVENOUS; SUBCUTANEOUS at 04:34

## 2022-11-24 RX ADMIN — ACYCLOVIR: 50 OINTMENT TOPICAL at 21:36

## 2022-11-24 RX ADMIN — HYDROCORTISONE SODIUM SUCCINATE 50 MG: 100 INJECTION, POWDER, FOR SOLUTION INTRAMUSCULAR; INTRAVENOUS at 18:31

## 2022-11-24 RX ADMIN — ACETYLCYSTEINE 2 ML: 200 SOLUTION ORAL; RESPIRATORY (INHALATION) at 15:06

## 2022-11-24 RX ADMIN — CALCIUM CHLORIDE, MAGNESIUM CHLORIDE, DEXTROSE MONOHYDRATE, LACTIC ACID, SODIUM CHLORIDE, SODIUM BICARBONATE AND POTASSIUM CHLORIDE 5000 ML: 5.15; 2.03; 22; 5.4; 6.46; 3.09; .157 INJECTION INTRAVENOUS at 21:28

## 2022-11-24 RX ADMIN — SODIUM CHLORIDE 5 UNITS: 9 INJECTION, SOLUTION INTRAVENOUS at 14:29

## 2022-11-24 RX ADMIN — DEXTROSE MONOHYDRATE 50 ML: 25 INJECTION, SOLUTION INTRAVENOUS at 14:29

## 2022-11-24 RX ADMIN — CALCIUM CHLORIDE, MAGNESIUM CHLORIDE, DEXTROSE MONOHYDRATE, LACTIC ACID, SODIUM CHLORIDE, SODIUM BICARBONATE AND POTASSIUM CHLORIDE: 5.15; 2.03; 22; 5.4; 6.46; 3.09; .157 INJECTION INTRAVENOUS at 16:10

## 2022-11-24 RX ADMIN — ACETYLCYSTEINE 2 ML: 200 SOLUTION ORAL; RESPIRATORY (INHALATION) at 20:48

## 2022-11-24 RX ADMIN — SODIUM ZIRCONIUM CYCLOSILICATE 10 G: 5 POWDER, FOR SUSPENSION ORAL at 11:46

## 2022-11-24 RX ADMIN — ACETYLCYSTEINE 2 ML: 200 SOLUTION ORAL; RESPIRATORY (INHALATION) at 07:47

## 2022-11-24 RX ADMIN — Medication 1 DROP: at 21:36

## 2022-11-24 RX ADMIN — HYDROCORTISONE SODIUM SUCCINATE 50 MG: 100 INJECTION, POWDER, FOR SOLUTION INTRAMUSCULAR; INTRAVENOUS at 10:09

## 2022-11-24 RX ADMIN — LIDOCAINE HYDROCHLORIDE ANHYDROUS 1 ML: 10 INJECTION, SOLUTION INFILTRATION at 13:08

## 2022-11-24 RX ADMIN — HEPARIN SODIUM 5000 UNITS: 5000 INJECTION, SOLUTION INTRAVENOUS; SUBCUTANEOUS at 05:59

## 2022-11-24 RX ADMIN — ACYCLOVIR: 50 OINTMENT TOPICAL at 06:01

## 2022-11-24 RX ADMIN — HEPARIN SODIUM 5000 UNITS: 5000 INJECTION, SOLUTION INTRAVENOUS; SUBCUTANEOUS at 21:36

## 2022-11-24 RX ADMIN — CALCIUM CHLORIDE, MAGNESIUM CHLORIDE, DEXTROSE MONOHYDRATE, LACTIC ACID, SODIUM CHLORIDE, SODIUM BICARBONATE AND POTASSIUM CHLORIDE 5000 ML: 5.15; 2.03; 22; 5.4; 6.46; 3.09; .157 INJECTION INTRAVENOUS at 16:10

## 2022-11-24 RX ADMIN — SODIUM CHLORIDE 300 MG: 9 INJECTION, SOLUTION INTRAVENOUS at 21:17

## 2022-11-24 RX ADMIN — INSULIN ASPART 1 UNITS: 100 INJECTION, SOLUTION INTRAVENOUS; SUBCUTANEOUS at 12:22

## 2022-11-24 RX ADMIN — INSULIN ASPART 1 UNITS: 100 INJECTION, SOLUTION INTRAVENOUS; SUBCUTANEOUS at 00:02

## 2022-11-24 RX ADMIN — HYDROCORTISONE SODIUM SUCCINATE 50 MG: 100 INJECTION, POWDER, FOR SOLUTION INTRAMUSCULAR; INTRAVENOUS at 04:13

## 2022-11-24 RX ADMIN — SENNOSIDES AND DOCUSATE SODIUM 1 TABLET: 50; 8.6 TABLET ORAL at 09:04

## 2022-11-24 RX ADMIN — IPRATROPIUM BROMIDE AND ALBUTEROL SULFATE 3 ML: .5; 3 SOLUTION RESPIRATORY (INHALATION) at 11:31

## 2022-11-24 RX ADMIN — ACYCLOVIR: 50 OINTMENT TOPICAL at 13:50

## 2022-11-24 RX ADMIN — CALCIUM CHLORIDE, MAGNESIUM CHLORIDE, DEXTROSE MONOHYDRATE, LACTIC ACID, SODIUM CHLORIDE, SODIUM BICARBONATE AND POTASSIUM CHLORIDE 5000 ML: 5.15; 2.03; 22; 5.4; 6.46; 3.09; .157 INJECTION INTRAVENOUS at 21:24

## 2022-11-24 ASSESSMENT — ACTIVITIES OF DAILY LIVING (ADL)
ADLS_ACUITY_SCORE: 55
ADLS_ACUITY_SCORE: 49
ADLS_ACUITY_SCORE: 49
ADLS_ACUITY_SCORE: 51
ADLS_ACUITY_SCORE: 49
ADLS_ACUITY_SCORE: 49
ADLS_ACUITY_SCORE: 51
ADLS_ACUITY_SCORE: 51

## 2022-11-24 NOTE — PROCEDURES
[facility]    Triple Lumen PICC Placement    Date/Time: 11/24/2022 1:36 PM  Performed by: Liliana Duran RN  Authorized by: Dominik Turner MD   Indications: vascular access      UNIVERSAL PROTOCOL   Site Marked: Yes  Prior Images Obtained and Reviewed:  Yes  Required items: Required blood products, implants, devices and special equipment available    Patient identity confirmed:  Verbally with patient, arm band, provided demographic data and hospital-assigned identification number  NA - No sedation, light sedation, or local anesthesia  Confirmation Checklist:  Patient's identity using two indicators, correct equipment/implants were available, relevant allergies and procedure was appropriate and matched the consent or emergent situation  Time out: Immediately prior to the procedure a time out was called    Universal Protocol: the Joint Commission Universal Protocol was followed    Preparation: Patient was prepped and draped in usual sterile fashion       ANESTHESIA    Local Anesthetic: Lidocaine 1% without epinephrine      SEDATION    Patient Sedated: No        Preparation: skin prepped with ChloraPrep  Skin prep agent: skin prep agent completely dried prior to procedure  Sterile barriers: maximum sterile barriers were used: cap, mask, sterile gown, sterile gloves, and large sterile sheet  Hand hygiene: hand hygiene performed prior to central venous catheter insertion  Type of line used: PICC  Catheter type: triple lumen  Lumen type: power PICC  Lumen Identification: White, Gray and Red  Catheter size: 5 Fr  Brand: Bard  Placement method: ultrasound  Number of attempts: 1  Difficulty threading catheter: no  Successful placement: yes  Orientation: right    Location: basilic vein  Tip Location: SVC  Arm circumference: adults 10 cm  Extremity circumference: 28  Visible catheter length: 2  Total catheter length: 40  Dressing and securement: dressing applied, occlusive dressing applied and  sterile dressing applied  Post procedure assessment: blood return through all ports  PROCEDURE   Patient Tolerance:  Patient tolerated the procedure well with no immediate complications   picc placed without difficulty/picc ok to use

## 2022-11-24 NOTE — PROGRESS NOTES
[facility] ICU RESPIRATORY NOTE        Date of Admission: 11/12/2022    Date of Intubation (most recent): 11/21/2022    Reason for Mechanical Ventilation: Respiratory failure    Number of Days on Mechanical Ventilation: 3    Met Criteria for Spontaneous Breathing Trial: Yes    Significant Events Today: None overnight    ABG Results:   Recent Labs   Lab 11/23/22  1107 11/23/22  0442 11/22/22  1839 11/22/22  1545   PH 7.34* 7.47* 7.47* 7.49*   PCO2 43 32* 33* 32*   PO2 99 100 104 136*   HCO3 23 23 24 24   O2PER 30 40 40 40       Current Vent Settings: Vent Mode: CMV/AC  (Continuous Mandatory Ventilation/ Assist Control)  FiO2 (%): 30 %  Resp Rate (Set): 16 breaths/min  Tidal Volume (Set, mL): 500 mL  PEEP (cm H2O): 5 cmH2O  Pressure Support (cm H2O): 5 cmH2O  Resp: 13      Skin Assessment: Intact    Plan: Continue full vent support with daily wean assessment    Chan Lowery, RRT on 11/24/2022 at 4:06 AM

## 2022-11-24 NOTE — PLAN OF CARE
Neuro: Follows commands. EFRAIN. Moves all extremities, weak. Remained off of sedation.   Respiratory: LS Diminished. Vent setting unchanged. Small amount of secretions.   Cardiac: NSR. On 0.5 of Yrn for MAP >65.   GI: Gastric residual of 1,000 mL, per MD to hold TF. No BM. BG Q4H.  : Anuric    Skin: PI on sacral,  mouth lesion (herpes)  Pain: Denies, does not want any pain meds (shook head no).   Activity: Repositioning Q2H.   IV infusions: Yrn 0.5 mcg/kg/min   Lines:  Lost arterial line, per MD okay to have without continue with cuff.

## 2022-11-24 NOTE — PROGRESS NOTES
ICU progress note    Assessment and plan: 57-year-old man with a history of liver transplant admitted with respiratory failure secondary to pneumonia complicated by cardiac arrest and prolonged resuscitation.    Neuro: Therapeutic sedation.  Wean as tolerated.    Cardiovascular: Mild hypotension, still requiring vasopressors especially in the setting of dialysis.  He will need to continue to have central access    He had a Coolguard in place for some time.  This is a risk for DVT and also limits mobility in the setting of possible extubation.  We will remove that and replaced with PICC today.    Pulmonary: Acute hypoxic respiratory failure secondary to pneumonia.  Status post 1 failed extubation.  Now on minimal settings with good strength.  Plan on pressure for trial today and extubation.    Pneumothorax: Chest tube in place    Nephrology: Dialysis dependent renal failure.  Following with nephrology.  They will do CRRT or HD as needed.  For hyperkalemia they will give a binder and we will see how he does.    Infectious disease: Complex picture with pneumococcus, parainfluenza, Aspergillus of unclear significance.  Currently on ceftriaxone and voriconazole.    GI: Liver transplant: Currently on steroids, plan on resuming tacrolimus as we continue to make progress.    Heme: Monitor thrombocytopenia.    Goals of care restorative.  Discussed with family at bedside.  His wife coming in later.  40 minutes critical care time thus far today.  This patient is critically ill at high risk of decompensation and death.    Subjective: Hyperkalemic overnight.  Awake and very interested in getting extubated.    On exam  BP 93/74   Pulse 67   Temp 97.7  F (36.5  C) (Axillary)   Resp 16   Ht 1.829 m (6')   Wt 99.1 kg (218 lb 7.6 oz)   SpO2 99%   BMI 29.63 kg/m    Awakens, interactive, moves extremities  Chest bilateral crackles  Neck supple  Abdomen soft  Regular rate and rhythm with palpable pulses    Labs  reviewed    Possibly spurious hyperkalemia, creatinine increasing, white count 16.8, hemoglobin 8.9, platelet 84.

## 2022-11-24 NOTE — PROGRESS NOTES
Renal Medicine Progress Note            Assessment/Plan:     Blanco Osborne is a 57-year-old male with PMH CARRILLO s/p liver transplant (9/2016) and cirrhosis admitted 11/12/2022 with out of hospital PEA arrest and acute hypoxemic respiratory failure.  Course complicated by parainfluenza virus, streptococcal bacteremia, and LORETTA requiring CRRT.      # Severe anuric LORETTA:                -CRRT stopped 11/22     # Septic shock with MODS:                -Phenylephrine               -Rocephin/voriconazole/Valtrex     # Respiratory failure: Re-intubated 11/21g. CXR personally reviewed. 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 Mild hyperkalemia.      Plan:  # Lokelma 10 grams now and may need to repeat doses.   # Lasix 80 mg IV x 1  # 1 grams of calcium gluconate  # Have nutritionist change to low K tube feed  # Recheck BMP at 1200- ordered  # Bladder scan intermittently  # Resume CRRT today if unable to control hyperkalemia.     I discussed the plan with Dr. Turner, ICU, his brother and sister in person.         Interval History:     Afebrile. VSS.   Intubated.   Awake and wo sedation.  FIO2 at 30%.   Net pos 800 ml wo counting insensible water loss.   Mild hyperkalemia, 5.7.          Medications and Allergies:       acetylcysteine  2 mL Nebulization 4x Daily     acyclovir   Topical Q8H     artificial tears  1 drop Both Eyes TID     cefTRIAXone  2 g Intravenous Q24H     chlorhexidine  15 mL Mouth/Throat Q12H     heparin ANTICOAGULANT  5,000 Units Subcutaneous Q8H     heparin lock flush  5-20 mL Intracatheter Q24H     hydrocortisone sodium succinate PF  50 mg Intravenous Q6H     insulin aspart  1-12 Units Subcutaneous Q4H     insulin glargine  10 Units Subcutaneous QAM AC     ipratropium - albuterol 0.5 mg/2.5 mg/3 mL  3  mL Nebulization 4x daily     multivitamins w/minerals  15 mL Per Feeding Tube Daily     pantoprazole  40 mg Per Feeding Tube QAM AC    Or     pantoprazole  40 mg Intravenous QAM AC     polyethylene glycol  17 g Oral Daily     senna-docusate  1 tablet Oral BID    Or     senna-docusate  2 tablet Oral BID     sodium chloride (PF)  10-40 mL Intracatheter Q8H     valACYclovir  500 mg Oral Q24H     voriconazole  300 mg Oral or Feeding Tube Q12H [facility] (08/20)      No Active Allergies         Physical Exam:   Vitals were reviewed   , Blood pressure 115/74, pulse 63, temperature 97.7  F (36.5  C), temperature source Axillary, resp. rate 15, height 1.829 m (6'), weight 99.1 kg (218 lb 7.6 oz), SpO2 96 %.    Wt Readings from Last 3 Encounters:   11/24/22 99.1 kg (218 lb 7.6 oz)   10/07/19 137.5 kg (303 lb 3.2 oz)   10/04/19 131.5 kg (290 lb)       Intake/Output Summary (Last 24 hours) at 11/24/2022 0918  Last data filed at 11/24/2022 0600  Gross per 24 hour   Intake 1514.38 ml   Output 1010 ml   Net 504.38 ml     GENERAL APPEARANCE: NAD. Stable.   HEENT:  Intubated.   RESP: No wheezes.  CV: RRR, nl S1/S2  ABDOMEN: distended, soft.   EXTREMITIES/SKIN: not much edema  NEURO: awake and following commands. No sedation.  RIJ temp cvc          Data:     CBC RESULTS:     Recent Labs   Lab 11/24/22  0425 11/23/22  0441 11/22/22  1200 11/22/22  0423 11/22/22  0025 11/21/22 2012   WBC 16.8* 18.6* 37.9* 42.8* 47.0* 48.6*   RBC 2.63* 2.64* 3.04* 2.84* 3.06* 2.98*   HGB 8.9* 9.0* 10.2* 9.6* 10.5* 10.0*   HCT 27.8* 27.4* 31.6* 29.9* 32.6* 31.8*   PLT 84* 89* 190 191 229 267       Basic Metabolic Panel:  Recent Labs   Lab 11/24/22  0828 11/24/22  0433 11/24/22  0425 11/24/22  0001 11/23/22  2033 11/23/22  1635 11/23/22  0442 11/23/22  0441 11/22/22  1204 11/22/22  1200 11/22/22  0427 11/22/22  0423 11/21/22  2021 11/21/22  2012 11/21/22  1601 11/21/22  1150   NA  --   --  135  --   --   --   --  136  --  135  --  137  --  136  --  138    POTASSIUM  --   --  5.7*  --   --   --   --  4.8  --  4.4  --  4.4  --  4.5  --  4.5   CHLORIDE  --   --  105  --   --   --   --  106  --  107  --  107  --  107  --  106   CO2  --   --  24  --   --   --   --  23  --  21  --  25  --  21  --  21   BUN  --   --  74*  --   --   --   --  53*  --  32*  --  30  --  27  --  24   CR  --   --  2.38*  --   --   --   --  1.51*  --  0.95  --  0.94  --  0.81  --  0.76   * 153* 165* 145* 230* 223*   < > 263*   < > 185*   < > 168*   < > 146*   < > 120*   KELLY  --   --  7.7*  --   --   --   --  7.6*  --  7.9*  --  7.9*  --  8.2*  --  8.4*    < > = values in this interval not displayed.       INRNo lab results found in last 7 days.   Attestation:   I have reviewed today's relevant vital signs, notes, medications, labs and imaging.    Zenon Bradshaw MD  [facility] - Nephrology  Office phone :[phone]  Pager: [phone]

## 2022-11-24 NOTE — PROVIDER NOTIFICATION
MD was notified about gastric residual this evening of 1,000 mL, platelet count of 89, and arterial line being half out.   Ordered to stop TF, okay to still give subcutaneous heparin, and to pull the macie (MD assessed the site).

## 2022-11-24 NOTE — PLAN OF CARE
Neuro: alert, JALYN orientation -  intubated, purposeful localized movement with all extremities, PERRL, opens eyes spontaneously, follows commands - weak, sedation weaned off - tolerating well    CV: tele SR, mayelin infusing to maintain MAP > 65    Resp: LS clear, pressure supported 5/5 at 30% for 2 hours - tolerated well    GI: BS active, last BM 11/21, TF increased to 90 ml/hr (goal)     : anuric      Skin: scab to nose/upper lip (herpes), generalized edema, mepilex on sacral pressure injury    Additional: denies pain, afebrile, wife, sister and brother updated at bedside

## 2022-11-24 NOTE — PROGRESS NOTES
Patient extubated to 5L oxymask at 1540 after successful completion of SBT.   Positive for cuff leak. No stridor. SBT 5/5 30% from 1200 today until extubation at 1540 today.     LS - Upper lobes clear, lower lobes slightly diminished w/ mild coarse crackles.   Patient able to clearly but roughly vocalize his name upon extubation.     RT to follow.    Guido Mclain, RT

## 2022-11-24 NOTE — PROGRESS NOTES
Clinical Nutrition Brief Note    Received call from RN -- pt moving from CRRT to HD, K/Phos levels remains high - request to switch to renal formula today. Current TF held d/t previously high residuals as well  Refer to last RD assessment dated 11/23 for more details     Labs reviewed: K 5.7 (H), Phos 5.9 (H), BUN 74 (H), Cr 2.38 (H)  Recent Labs   Lab 11/24/22  0828 11/24/22  0433 11/24/22  0425 11/24/22  0001 11/23/22 2033 11/23/22  1635   * 153* 165* 145* 230* 223*     Medications reviewed: lasix, HSSI + 10 units lantus, miralax, phenylephrine at 0.3 mc/kg/min, off Propofol yesterday      Plan:  Change to Novasource renal with goal rate of 55 mL/hr x 20 hrs to provide 2160 kcal (25 kcal/kg), 98 g protein (1.1 g/kg and 98% estimated needs), 197 g CHO, 0 g fiber, 774 mL H2O, 1020 mg K, and 884 gm Phos   FWF 30 mL q 4 hours  Certavite 15 mL/day  -- Continue to hold TF for VFEND dose 1 hour before and 1 hour after administration (getting BID)    Resume TF at 25 mL/hr and advance to goal rate after 6 hrs as tolerated  Will continue to follow patient    Deann Rainey, DEANNA, LD  Clinical Dietitian   Weekend/Holiday pager [phone]

## 2022-11-25 ENCOUNTER — APPOINTMENT (OUTPATIENT)
Dept: SPEECH THERAPY | Facility: CLINIC | Age: 57
DRG: 004 | End: 2022-11-25
Attending: INTERNAL MEDICINE
Payer: COMMERCIAL

## 2022-11-25 ENCOUNTER — APPOINTMENT (OUTPATIENT)
Dept: OCCUPATIONAL THERAPY | Facility: CLINIC | Age: 57
DRG: 004 | End: 2022-11-25
Attending: INTERNAL MEDICINE
Payer: COMMERCIAL

## 2022-11-25 ENCOUNTER — APPOINTMENT (OUTPATIENT)
Dept: PHYSICAL THERAPY | Facility: CLINIC | Age: 57
DRG: 004 | End: 2022-11-25
Attending: INTERNAL MEDICINE
Payer: COMMERCIAL

## 2022-11-25 ENCOUNTER — APPOINTMENT (OUTPATIENT)
Dept: GENERAL RADIOLOGY | Facility: CLINIC | Age: 57
DRG: 004 | End: 2022-11-25
Payer: COMMERCIAL

## 2022-11-25 LAB
ALBUMIN SERPL-MCNC: 1.9 G/DL (ref 3.4–5)
ALBUMIN SERPL-MCNC: 1.9 G/DL (ref 3.4–5)
ALBUMIN SERPL-MCNC: 2.1 G/DL (ref 3.4–5)
ALBUMIN UR-MCNC: 70 MG/DL
ALP SERPL-CCNC: 192 U/L (ref 40–150)
ALT SERPL W P-5'-P-CCNC: 34 U/L (ref 0–70)
ANION GAP SERPL CALCULATED.3IONS-SCNC: 10 MMOL/L (ref 3–14)
ANION GAP SERPL CALCULATED.3IONS-SCNC: 5 MMOL/L (ref 3–14)
ANION GAP SERPL CALCULATED.3IONS-SCNC: 8 MMOL/L (ref 3–14)
APPEARANCE UR: ABNORMAL
AST SERPL W P-5'-P-CCNC: 34 U/L (ref 0–45)
BASE EXCESS BLDV CALC-SCNC: -1.4 MMOL/L (ref -7.7–1.9)
BILIRUB DIRECT SERPL-MCNC: 1 MG/DL (ref 0–0.2)
BILIRUB SERPL-MCNC: 1.5 MG/DL (ref 0.2–1.3)
BILIRUB UR QL STRIP: NEGATIVE
BUN SERPL-MCNC: 48 MG/DL (ref 7–30)
BUN SERPL-MCNC: 58 MG/DL (ref 7–30)
BUN SERPL-MCNC: 58 MG/DL (ref 7–30)
CA-I BLD-MCNC: 4.6 MG/DL (ref 4.4–5.2)
CA-I BLD-MCNC: 4.9 MG/DL (ref 4.4–5.2)
CA-I BLD-MCNC: 4.9 MG/DL (ref 4.4–5.2)
CALCIUM SERPL-MCNC: 7.9 MG/DL (ref 8.5–10.1)
CALCIUM SERPL-MCNC: 8.4 MG/DL (ref 8.5–10.1)
CALCIUM SERPL-MCNC: 9.1 MG/DL (ref 8.5–10.1)
CHLORIDE BLD-SCNC: 104 MMOL/L (ref 94–109)
CHLORIDE BLD-SCNC: 104 MMOL/L (ref 94–109)
CHLORIDE BLD-SCNC: 106 MMOL/L (ref 94–109)
CO2 SERPL-SCNC: 23 MMOL/L (ref 20–32)
CO2 SERPL-SCNC: 24 MMOL/L (ref 20–32)
CO2 SERPL-SCNC: 26 MMOL/L (ref 20–32)
COLOR UR AUTO: YELLOW
CREAT SERPL-MCNC: 1.43 MG/DL (ref 0.66–1.25)
CREAT SERPL-MCNC: 1.79 MG/DL (ref 0.66–1.25)
CREAT SERPL-MCNC: 1.84 MG/DL (ref 0.66–1.25)
ERYTHROCYTE [DISTWIDTH] IN BLOOD BY AUTOMATED COUNT: 18.7 % (ref 10–15)
ERYTHROCYTE [DISTWIDTH] IN BLOOD BY AUTOMATED COUNT: 18.7 % (ref 10–15)
ERYTHROCYTE [DISTWIDTH] IN BLOOD BY AUTOMATED COUNT: 18.8 % (ref 10–15)
GFR SERPL CREATININE-BSD FRML MDRD: 42 ML/MIN/1.73M2
GFR SERPL CREATININE-BSD FRML MDRD: 43 ML/MIN/1.73M2
GFR SERPL CREATININE-BSD FRML MDRD: 57 ML/MIN/1.73M2
GLUCOSE BLD-MCNC: 140 MG/DL (ref 70–99)
GLUCOSE BLD-MCNC: 140 MG/DL (ref 70–99)
GLUCOSE BLD-MCNC: 151 MG/DL (ref 70–99)
GLUCOSE BLDC GLUCOMTR-MCNC: 111 MG/DL (ref 70–99)
GLUCOSE BLDC GLUCOMTR-MCNC: 126 MG/DL (ref 70–99)
GLUCOSE BLDC GLUCOMTR-MCNC: 129 MG/DL (ref 70–99)
GLUCOSE BLDC GLUCOMTR-MCNC: 130 MG/DL (ref 70–99)
GLUCOSE BLDC GLUCOMTR-MCNC: 151 MG/DL (ref 70–99)
GLUCOSE UR STRIP-MCNC: NEGATIVE MG/DL
HCO3 BLDV-SCNC: 25 MMOL/L (ref 21–28)
HCT VFR BLD AUTO: 30 % (ref 40–53)
HCT VFR BLD AUTO: 30.3 % (ref 40–53)
HCT VFR BLD AUTO: 34 % (ref 40–53)
HGB BLD-MCNC: 10.6 G/DL (ref 13.3–17.7)
HGB BLD-MCNC: 9.4 G/DL (ref 13.3–17.7)
HGB BLD-MCNC: 9.5 G/DL (ref 13.3–17.7)
HGB UR QL STRIP: ABNORMAL
HSV1 IGG SERPL QL IA: 34.4 INDEX
HSV1 IGG SERPL QL IA: ABNORMAL
HSV2 IGG SERPL QL IA: 1.2 INDEX
HSV2 IGG SERPL QL IA: ABNORMAL
KETONES UR STRIP-MCNC: NEGATIVE MG/DL
LEUKOCYTE ESTERASE UR QL STRIP: ABNORMAL
MAGNESIUM SERPL-MCNC: 2.1 MG/DL (ref 1.6–2.3)
MAGNESIUM SERPL-MCNC: 2.1 MG/DL (ref 1.6–2.3)
MAGNESIUM SERPL-MCNC: 2.2 MG/DL (ref 1.6–2.3)
MCH RBC QN AUTO: 33.5 PG (ref 26.5–33)
MCH RBC QN AUTO: 33.5 PG (ref 26.5–33)
MCH RBC QN AUTO: 33.8 PG (ref 26.5–33)
MCHC RBC AUTO-ENTMCNC: 31.2 G/DL (ref 31.5–36.5)
MCHC RBC AUTO-ENTMCNC: 31.3 G/DL (ref 31.5–36.5)
MCHC RBC AUTO-ENTMCNC: 31.4 G/DL (ref 31.5–36.5)
MCV RBC AUTO: 107 FL (ref 78–100)
MCV RBC AUTO: 108 FL (ref 78–100)
MCV RBC AUTO: 108 FL (ref 78–100)
NITRATE UR QL: NEGATIVE
O2/TOTAL GAS SETTING VFR VENT: 45 %
PCO2 BLDV: 50 MM HG (ref 40–50)
PH BLDV: 7.31 [PH] (ref 7.32–7.43)
PH UR STRIP: 5.5 [PH] (ref 5–7)
PHOSPHATE SERPL-MCNC: 4.9 MG/DL (ref 2.5–4.5)
PHOSPHATE SERPL-MCNC: 5.6 MG/DL (ref 2.5–4.5)
PHOSPHATE SERPL-MCNC: 5.9 MG/DL (ref 2.5–4.5)
PLATELET # BLD AUTO: 110 10E3/UL (ref 150–450)
PLATELET # BLD AUTO: 192 10E3/UL (ref 150–450)
PLATELET # BLD AUTO: 82 10E3/UL (ref 150–450)
PO2 BLDV: 32 MM HG (ref 25–47)
POTASSIUM BLD-SCNC: 4.7 MMOL/L (ref 3.4–5.3)
POTASSIUM BLD-SCNC: 4.8 MMOL/L (ref 3.4–5.3)
POTASSIUM BLD-SCNC: 4.8 MMOL/L (ref 3.4–5.3)
PROT SERPL-MCNC: 5.6 G/DL (ref 6.8–8.8)
RBC # BLD AUTO: 2.78 10E6/UL (ref 4.4–5.9)
RBC # BLD AUTO: 2.84 10E6/UL (ref 4.4–5.9)
RBC # BLD AUTO: 3.16 10E6/UL (ref 4.4–5.9)
SODIUM SERPL-SCNC: 135 MMOL/L (ref 133–144)
SODIUM SERPL-SCNC: 136 MMOL/L (ref 133–144)
SODIUM SERPL-SCNC: 139 MMOL/L (ref 133–144)
SP GR UR STRIP: 1.02 (ref 1–1.03)
UROBILINOGEN UR STRIP-MCNC: NORMAL MG/DL
WBC # BLD AUTO: 12.2 10E3/UL (ref 4–11)
WBC # BLD AUTO: 14.1 10E3/UL (ref 4–11)
WBC # BLD AUTO: 21.8 10E3/UL (ref 4–11)

## 2022-11-25 PROCEDURE — 200N000001 HC R&B ICU

## 2022-11-25 PROCEDURE — 71045 X-RAY EXAM CHEST 1 VIEW: CPT

## 2022-11-25 PROCEDURE — 250N000011 HC RX IP 250 OP 636: Performed by: INTERNAL MEDICINE

## 2022-11-25 PROCEDURE — 250N000009 HC RX 250: Performed by: STUDENT IN AN ORGANIZED HEALTH CARE EDUCATION/TRAINING PROGRAM

## 2022-11-25 PROCEDURE — 84100 ASSAY OF PHOSPHORUS: CPT | Performed by: INTERNAL MEDICINE

## 2022-11-25 PROCEDURE — 97162 PT EVAL MOD COMPLEX 30 MIN: CPT | Mod: GP

## 2022-11-25 PROCEDURE — 94640 AIRWAY INHALATION TREATMENT: CPT | Mod: 76

## 2022-11-25 PROCEDURE — 82248 BILIRUBIN DIRECT: CPT | Performed by: INTERNAL MEDICINE

## 2022-11-25 PROCEDURE — 92610 EVALUATE SWALLOWING FUNCTION: CPT | Mod: GN

## 2022-11-25 PROCEDURE — 99233 SBSQ HOSP IP/OBS HIGH 50: CPT | Performed by: SPECIALIST

## 2022-11-25 PROCEDURE — C9113 INJ PANTOPRAZOLE SODIUM, VIA: HCPCS | Performed by: STUDENT IN AN ORGANIZED HEALTH CARE EDUCATION/TRAINING PROGRAM

## 2022-11-25 PROCEDURE — 82330 ASSAY OF CALCIUM: CPT | Performed by: INTERNAL MEDICINE

## 2022-11-25 PROCEDURE — 82803 BLOOD GASES ANY COMBINATION: CPT | Performed by: INTERNAL MEDICINE

## 2022-11-25 PROCEDURE — 82247 BILIRUBIN TOTAL: CPT | Performed by: INTERNAL MEDICINE

## 2022-11-25 PROCEDURE — 97112 NEUROMUSCULAR REEDUCATION: CPT | Mod: GP

## 2022-11-25 PROCEDURE — 99233 SBSQ HOSP IP/OBS HIGH 50: CPT | Performed by: INTERNAL MEDICINE

## 2022-11-25 PROCEDURE — 250N000013 HC RX MED GY IP 250 OP 250 PS 637: Performed by: STUDENT IN AN ORGANIZED HEALTH CARE EDUCATION/TRAINING PROGRAM

## 2022-11-25 PROCEDURE — 250N000011 HC RX IP 250 OP 636: Performed by: SPECIALIST

## 2022-11-25 PROCEDURE — 250N000011 HC RX IP 250 OP 636

## 2022-11-25 PROCEDURE — 258N000003 HC RX IP 258 OP 636: Performed by: INTERNAL MEDICINE

## 2022-11-25 PROCEDURE — 94799 UNLISTED PULMONARY SVC/PX: CPT

## 2022-11-25 PROCEDURE — 999N000105 HC STATISTIC NO DOCUMENTATION TO SUPPORT CHARGE

## 2022-11-25 PROCEDURE — 250N000009 HC RX 250: Performed by: INTERNAL MEDICINE

## 2022-11-25 PROCEDURE — 99291 CRITICAL CARE FIRST HOUR: CPT | Performed by: INTERNAL MEDICINE

## 2022-11-25 PROCEDURE — 258N000003 HC RX IP 258 OP 636

## 2022-11-25 PROCEDURE — 250N000011 HC RX IP 250 OP 636: Performed by: STUDENT IN AN ORGANIZED HEALTH CARE EDUCATION/TRAINING PROGRAM

## 2022-11-25 PROCEDURE — 85027 COMPLETE CBC AUTOMATED: CPT | Performed by: INTERNAL MEDICINE

## 2022-11-25 PROCEDURE — 83735 ASSAY OF MAGNESIUM: CPT | Performed by: INTERNAL MEDICINE

## 2022-11-25 PROCEDURE — 97166 OT EVAL MOD COMPLEX 45 MIN: CPT | Mod: GO | Performed by: OCCUPATIONAL THERAPIST

## 2022-11-25 PROCEDURE — 97110 THERAPEUTIC EXERCISES: CPT | Mod: GP

## 2022-11-25 PROCEDURE — 84155 ASSAY OF PROTEIN SERUM: CPT | Performed by: INTERNAL MEDICINE

## 2022-11-25 PROCEDURE — 81003 URINALYSIS AUTO W/O SCOPE: CPT | Performed by: STUDENT IN AN ORGANIZED HEALTH CARE EDUCATION/TRAINING PROGRAM

## 2022-11-25 PROCEDURE — 97530 THERAPEUTIC ACTIVITIES: CPT | Mod: GO | Performed by: OCCUPATIONAL THERAPIST

## 2022-11-25 PROCEDURE — 999N000157 HC STATISTIC RCP TIME EA 10 MIN

## 2022-11-25 RX ORDER — DEXMEDETOMIDINE HYDROCHLORIDE 4 UG/ML
.1-1.2 INJECTION, SOLUTION INTRAVENOUS CONTINUOUS
Status: DISCONTINUED | OUTPATIENT
Start: 2022-11-25 | End: 2022-11-30

## 2022-11-25 RX ORDER — CEFTRIAXONE 2 G/1
2 INJECTION, POWDER, FOR SOLUTION INTRAMUSCULAR; INTRAVENOUS EVERY 24 HOURS
Status: COMPLETED | OUTPATIENT
Start: 2022-11-26 | End: 2022-11-27

## 2022-11-25 RX ORDER — HYDROMORPHONE HCL IN WATER/PF 6 MG/30 ML
0.4 PATIENT CONTROLLED ANALGESIA SYRINGE INTRAVENOUS
Status: DISCONTINUED | OUTPATIENT
Start: 2022-11-25 | End: 2022-11-28

## 2022-11-25 RX ORDER — DILTIAZEM HYDROCHLORIDE 30 MG/1
30 TABLET, FILM COATED ORAL EVERY 6 HOURS SCHEDULED
Status: DISCONTINUED | OUTPATIENT
Start: 2022-11-25 | End: 2022-11-26

## 2022-11-25 RX ADMIN — CALCIUM CHLORIDE, MAGNESIUM CHLORIDE, DEXTROSE MONOHYDRATE, LACTIC ACID, SODIUM CHLORIDE, SODIUM BICARBONATE AND POTASSIUM CHLORIDE: 5.15; 2.03; 22; 5.4; 6.46; 3.09; .157 INJECTION INTRAVENOUS at 09:39

## 2022-11-25 RX ADMIN — ACYCLOVIR: 50 OINTMENT TOPICAL at 22:37

## 2022-11-25 RX ADMIN — ACYCLOVIR: 50 OINTMENT TOPICAL at 06:19

## 2022-11-25 RX ADMIN — ACETYLCYSTEINE 2 ML: 200 SOLUTION ORAL; RESPIRATORY (INHALATION) at 08:36

## 2022-11-25 RX ADMIN — CALCIUM CHLORIDE, MAGNESIUM CHLORIDE, DEXTROSE MONOHYDRATE, LACTIC ACID, SODIUM CHLORIDE, SODIUM BICARBONATE AND POTASSIUM CHLORIDE 5000 ML: 5.15; 2.03; 22; 5.4; 6.46; 3.09; .157 INJECTION INTRAVENOUS at 02:18

## 2022-11-25 RX ADMIN — DILTIAZEM HYDROCHLORIDE 5 MG/HR: 5 INJECTION, SOLUTION INTRAVENOUS at 16:07

## 2022-11-25 RX ADMIN — HEPARIN SODIUM 5000 UNITS: 5000 INJECTION, SOLUTION INTRAVENOUS; SUBCUTANEOUS at 06:11

## 2022-11-25 RX ADMIN — Medication 1 DROP: at 22:32

## 2022-11-25 RX ADMIN — HEPARIN SODIUM 5000 UNITS: 5000 INJECTION, SOLUTION INTRAVENOUS; SUBCUTANEOUS at 22:32

## 2022-11-25 RX ADMIN — CALCIUM CHLORIDE, MAGNESIUM CHLORIDE, DEXTROSE MONOHYDRATE, LACTIC ACID, SODIUM CHLORIDE, SODIUM BICARBONATE AND POTASSIUM CHLORIDE 5000 ML: 5.15; 2.03; 22; 5.4; 6.46; 3.09; .157 INJECTION INTRAVENOUS at 21:54

## 2022-11-25 RX ADMIN — ACETYLCYSTEINE 2 ML: 200 SOLUTION ORAL; RESPIRATORY (INHALATION) at 20:26

## 2022-11-25 RX ADMIN — HEPARIN SODIUM 1300 UNITS: 1000 INJECTION, SOLUTION INTRAVENOUS; SUBCUTANEOUS at 08:45

## 2022-11-25 RX ADMIN — HEPARIN SODIUM 5000 UNITS: 5000 INJECTION, SOLUTION INTRAVENOUS; SUBCUTANEOUS at 16:30

## 2022-11-25 RX ADMIN — CALCIUM CHLORIDE, MAGNESIUM CHLORIDE, DEXTROSE MONOHYDRATE, LACTIC ACID, SODIUM CHLORIDE, SODIUM BICARBONATE AND POTASSIUM CHLORIDE 5000 ML: 5.15; 2.03; 22; 5.4; 6.46; 3.09; .157 INJECTION INTRAVENOUS at 15:59

## 2022-11-25 RX ADMIN — CALCIUM CHLORIDE, MAGNESIUM CHLORIDE, DEXTROSE MONOHYDRATE, LACTIC ACID, SODIUM CHLORIDE, SODIUM BICARBONATE AND POTASSIUM CHLORIDE 5000 ML: 5.15; 2.03; 22; 5.4; 6.46; 3.09; .157 INJECTION INTRAVENOUS at 09:39

## 2022-11-25 RX ADMIN — HYDROCORTISONE SODIUM SUCCINATE 50 MG: 100 INJECTION, POWDER, FOR SOLUTION INTRAMUSCULAR; INTRAVENOUS at 18:11

## 2022-11-25 RX ADMIN — IPRATROPIUM BROMIDE AND ALBUTEROL SULFATE 3 ML: .5; 3 SOLUTION RESPIRATORY (INHALATION) at 08:36

## 2022-11-25 RX ADMIN — ONDANSETRON 4 MG: 2 INJECTION INTRAMUSCULAR; INTRAVENOUS at 11:20

## 2022-11-25 RX ADMIN — ACETYLCYSTEINE 2 ML: 200 SOLUTION ORAL; RESPIRATORY (INHALATION) at 12:11

## 2022-11-25 RX ADMIN — CEFTRIAXONE SODIUM 2 G: 2 INJECTION, POWDER, FOR SOLUTION INTRAMUSCULAR; INTRAVENOUS at 06:11

## 2022-11-25 RX ADMIN — SODIUM CHLORIDE, PRESERVATIVE FREE 5 ML: 5 INJECTION INTRAVENOUS at 18:30

## 2022-11-25 RX ADMIN — CALCIUM CHLORIDE, MAGNESIUM CHLORIDE, DEXTROSE MONOHYDRATE, LACTIC ACID, SODIUM CHLORIDE, SODIUM BICARBONATE AND POTASSIUM CHLORIDE 5000 ML: 5.15; 2.03; 22; 5.4; 6.46; 3.09; .157 INJECTION INTRAVENOUS at 02:19

## 2022-11-25 RX ADMIN — Medication 1 DROP: at 08:05

## 2022-11-25 RX ADMIN — IPRATROPIUM BROMIDE AND ALBUTEROL SULFATE 3 ML: .5; 3 SOLUTION RESPIRATORY (INHALATION) at 15:50

## 2022-11-25 RX ADMIN — HYDROCORTISONE SODIUM SUCCINATE 50 MG: 100 INJECTION, POWDER, FOR SOLUTION INTRAMUSCULAR; INTRAVENOUS at 12:34

## 2022-11-25 RX ADMIN — HYDROMORPHONE HYDROCHLORIDE 0.4 MG: 0.2 INJECTION, SOLUTION INTRAMUSCULAR; INTRAVENOUS; SUBCUTANEOUS at 16:24

## 2022-11-25 RX ADMIN — IPRATROPIUM BROMIDE AND ALBUTEROL SULFATE 3 ML: .5; 3 SOLUTION RESPIRATORY (INHALATION) at 20:26

## 2022-11-25 RX ADMIN — PANTOPRAZOLE SODIUM 40 MG: 40 INJECTION, POWDER, FOR SOLUTION INTRAVENOUS at 08:01

## 2022-11-25 RX ADMIN — HYDROCORTISONE SODIUM SUCCINATE 50 MG: 100 INJECTION, POWDER, FOR SOLUTION INTRAMUSCULAR; INTRAVENOUS at 06:11

## 2022-11-25 RX ADMIN — CALCIUM CHLORIDE, MAGNESIUM CHLORIDE, DEXTROSE MONOHYDRATE, LACTIC ACID, SODIUM CHLORIDE, SODIUM BICARBONATE AND POTASSIUM CHLORIDE: 5.15; 2.03; 22; 5.4; 6.46; 3.09; .157 INJECTION INTRAVENOUS at 02:19

## 2022-11-25 RX ADMIN — Medication 5 ML: at 08:43

## 2022-11-25 RX ADMIN — ACETYLCYSTEINE 2 ML: 200 SOLUTION ORAL; RESPIRATORY (INHALATION) at 15:50

## 2022-11-25 RX ADMIN — HYDROCORTISONE SODIUM SUCCINATE 50 MG: 100 INJECTION, POWDER, FOR SOLUTION INTRAMUSCULAR; INTRAVENOUS at 00:32

## 2022-11-25 RX ADMIN — METOPROLOL TARTRATE 5 MG: 5 INJECTION INTRAVENOUS at 14:44

## 2022-11-25 RX ADMIN — ACYCLOVIR: 50 OINTMENT TOPICAL at 13:37

## 2022-11-25 RX ADMIN — CALCIUM CHLORIDE, MAGNESIUM CHLORIDE, DEXTROSE MONOHYDRATE, LACTIC ACID, SODIUM CHLORIDE, SODIUM BICARBONATE AND POTASSIUM CHLORIDE: 5.15; 2.03; 22; 5.4; 6.46; 3.09; .157 INJECTION INTRAVENOUS at 21:54

## 2022-11-25 RX ADMIN — Medication 1 DROP: at 16:26

## 2022-11-25 RX ADMIN — INSULIN ASPART 1 UNITS: 100 INJECTION, SOLUTION INTRAVENOUS; SUBCUTANEOUS at 04:45

## 2022-11-25 RX ADMIN — IPRATROPIUM BROMIDE AND ALBUTEROL SULFATE 3 ML: .5; 3 SOLUTION RESPIRATORY (INHALATION) at 12:11

## 2022-11-25 RX ADMIN — INSULIN ASPART 1 UNITS: 100 INJECTION, SOLUTION INTRAVENOUS; SUBCUTANEOUS at 19:53

## 2022-11-25 RX ADMIN — HEPARIN SODIUM 1300 UNITS: 1000 INJECTION, SOLUTION INTRAVENOUS; SUBCUTANEOUS at 08:44

## 2022-11-25 RX ADMIN — SODIUM CHLORIDE 300 MG: 9 INJECTION, SOLUTION INTRAVENOUS at 09:53

## 2022-11-25 ASSESSMENT — ACTIVITIES OF DAILY LIVING (ADL)
ADLS_ACUITY_SCORE: 49

## 2022-11-25 NOTE — PROGRESS NOTES
End of shift report:    Extubated at 1540. Patient on 3L oxymask now with SpO2 ~95%.  Has a very weak cough with moderate-large secretion production.  I orally deep-suctioned a moderate amount of cloudy thick secretions this evening.     RT to monitor. Further consideration of deep-suctioning recommended.    Guido Mclain, RT

## 2022-11-25 NOTE — PROGRESS NOTES
Neuro: Alert and oriented. Cam NEG. Hoarse voice and difficult to understand. SOTO--very weak. Denies pain.     Last NIHSS score: NA    Cardiovascular: Tele: SR no ectopy Still on phenylephrine for BP support, titrating dose down from this a.m.     Pulmonary: Lungs coarse this afternoon and evening. Extubated today to oxymask. Currently on 3 L Oxymask. Weak, congested cough. Unable to expectorate sputum.     : Anuric. CRRT restarted this afternoon for hyperkalemia.    GI: abdomen soft, non tender. 1 small smear or BM. OG removed with extubation, NPO. SPeech consult orders.     Skin: Wound care per plan of care.     Mobility: Up in chair position in bed--constant issues with access line on CRRT machine in while upright.     IV: right groin sheath removed. Picc placed today.     Brother sister and wife at bedside. Attentive to patient.

## 2022-11-25 NOTE — PROVIDER NOTIFICATION
Notified provider that pt went into what looked like A fib in the 150's. Pt was SR for previous shift. Gave PRN metoprolol, rate down came down <120. Pt asymptomatic throughout, BP supported with EUSEBIA. No orders given. Continuing to monitor.

## 2022-11-25 NOTE — PROGRESS NOTES
ICU progress note    Assessment and plan: 57-year-old man with a history of liver transplant admitted with respiratory failure secondary to pneumonia complicated by cardiac arrest and prolonged resuscitation.    Per nephrology we will continue CRRT overnight and stop in the morning.  He should be able to leave the ICU at that time.    Neuro: Clear today.  Doing well.    Cardiovascular: Hypotension resolved.  Phenylephrine titrated off.    Pulmonary: Acute hypoxic respiratory failure secondary to pneumonia.  Failed extubation a few days ago     Pneumothorax: Chest tube in place    Nephrology: Dialysis dependent renal failure.  Following with nephrology.  Because of hyperkalemia and lack of staff he was on CRRT but we will see how his labs look today.    Infectious disease: Complex picture with pneumococcus, parainfluenza, Aspergillus of unclear significance.  Currently on ceftriaxone and voriconazole.  ID is following.    GI: Liver transplant: Currently on steroids, given his improvement we could consider restarting tacrolimus.    Heme: Monitor thrombocytopenia.    30 minutes of critical care time thus far today.  This patient is critically ill at high risk of decompensation and death.    Subjective: Extubated yesterday and doing well.    On exam  /68   Pulse 75   Temp 98.1  F (36.7  C) (Oral)   Resp 12   Ht 1.829 m (6')   Wt 99.1 kg (218 lb 7.6 oz)   SpO2 98%   BMI 29.63 kg/m    Awakens, interactive, moves extremities  Chest bilateral crackles  Neck supple  Abdomen soft  Regular rate and rhythm with palpable pulses    Labs reviewed    Potassium down, hemoglobin stable, platelets stable.

## 2022-11-25 NOTE — PROGRESS NOTES
MD Bradshaw called. Ordered to pull net 100 ml/hr as long as pts BP tolerates it. Can go up to 200 ml/hr after 2 hours of tolerating 100 ml/h.

## 2022-11-25 NOTE — PLAN OF CARE
Care provided from [id number]    Neuro: lethargic but oriented. Moves all extremities weakly.   CV: SR with stable blood pressures for most of the shift. Good pulses. Around 1450 patient went into a-fin RVR. MD aware, metoprolol 5mg given. Diltiazem drip ordered from pharmacy.   Pulm: 10L nasal canula. Coarse lungs lungs sounds. Productive weak cough.   GI/: anuric. Active bowel sounds.   CRRT: pulling 100-200ml/hr. Changed the filter twice.   Additional: patient sitting up in the chair. Family at bedside.     Patient and family updated on plan of care.

## 2022-11-25 NOTE — PROGRESS NOTES
[facility]    Infectious Disease Progress Note    Date of Service: 11/25/2022       Assessment:  57 YM with hx of liver transplantation who is hopsitalzied with out of hospital PEA cardiac arrest with 20 min CPR and found to have pneumococcal pneumonia with ARDS and sepsis superimposed on  Parainfluenza pneumonia. He was in septic shock with multiorgan failure with acute hypoxic respiratory failure requiring mechanical ventilation, LORETTA requiring CRRT  Course was  complicated by barotrauma with right pneumothorax requiring chest tube . 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 On Tacrolimus  -LORETTA on CRRT  -Thrombocytopenia related to sepsis improving  -anemia     Recommendations  1. Ceftriaxone 2 grams daily -plan treatment for 2 weeks for bacteremia/sepsis until 11/27  2. Discontinue Voriconazole , do not suspect invasive fungal infection of the lung, isolation of Aspergillus terreus likely signifies colonization, CXr improved      Tori Hernández MD    Interval History   Extubated, sitting in a chair, answers questions appropriately, complains of some abdominal tenderness, no additional complaints     Physical Exam   Temp: 98.1  F (36.7  C) Temp src: Oral BP: 100/73 Pulse: 78   Resp: 17 SpO2: 97 % O2 Device: Oxymask Oxygen Delivery: 7 LPM  Vitals:    11/22/22 0200 11/23/22 0200 11/24/22 0400   Weight: 99.6 kg (219 lb 9.3 oz) 97.2 kg (214 lb 4.6 oz) 99.1 kg (218 lb 7.6 oz)     Vital Signs with Ranges  Temp:  [97.5  F (36.4   C)-98.1  F (36.7  C)] 98.1  F (36.7  C)  Pulse:  [] 78  Resp:  [9-29] 17  BP: ()/(56-95) 100/73  FiO2 (%):  [3 %-30 %] 3 %  SpO2:  [86 %-100 %] 97 %    Constitutional: Awake, alert, cooperative,   Lungs: Clear to auscultation bilaterally, no crackles or wheezing  Cardiovascular: S1S2  Abdomen: distended, soft, diffusely tender  Skin: no rash    Other:    Medications     dextrose       CRRT replacement solution       fentaNYL Stopped (11/23/22 1616)     - MEDICATION INSTRUCTIONS -       - MEDICATION INSTRUCTIONS -       - MEDICATION INSTRUCTIONS -       norepinephrine       phenylephrine Stopped (11/25/22 0302)     CRRT replacement solution 200 mL/hr at 11/25/22 0939     CRRT replacement solution       propofol Stopped (11/23/22 1414)     sodium chloride 10 mL/hr at 11/23/22 1209     sodium chloride 0 mL/hr at 11/22/22 0742     vasopressin         acetylcysteine  2 mL Nebulization 4x Daily     acyclovir   Topical Q8H     artificial tears  1 drop Both Eyes TID     cefTRIAXone  2 g Intravenous Q24H     chlorhexidine  15 mL Mouth/Throat Q12H     heparin ANTICOAGULANT  5,000 Units Subcutaneous Q8H     heparin lock flush  5-20 mL Intracatheter Q24H     hydrocortisone sodium succinate PF  50 mg Intravenous Q6H     insulin aspart  1-12 Units Subcutaneous Q4H     insulin glargine  10 Units Subcutaneous QAM AC     ipratropium - albuterol 0.5 mg/2.5 mg/3 mL  3 mL Nebulization 4x daily     multivitamins w/minerals  15 mL Per Feeding Tube Daily     pantoprazole  40 mg Per Feeding Tube QAM AC    Or     pantoprazole  40 mg Intravenous QAM AC     polyethylene glycol  17 g Oral Daily     senna-docusate  1 tablet Oral BID    Or     senna-docusate  2 tablet Oral BID     sodium chloride (PF)  10-40 mL Intracatheter Q8H     sodium chloride (PF)  10-40 mL Intracatheter Q8H     sodium chloride (PF) 0.9%  10 mL Intracatheter Once in dialysis/CRRT     sodium chloride (PF) 0.9%  10 mL Intracatheter Once in dialysis/CRRT      voriconazole  300 mg Intravenous Q12H       Data   All microbiology laboratory data reviewed.  Recent Labs   Lab Test 11/25/22  1150 11/25/22  0409 11/24/22  0425   WBC 14.1* 12.2* 16.8*   HGB 9.5* 9.4* 8.9*   HCT 30.3* 30.0* 27.8*   * 108* 106*   * 82* 84*     Recent Labs   Lab Test 11/25/22  1150 11/25/22  0409 11/24/22 2002   CR 1.79* 1.84* 2.30*          Imaging  11/21 CXR  EXAM: XR CHEST PORT 1 VIEW  LOCATION: [facility]  DATE/TIME: 11/21/2022 4:49 PM     INDICATION: Pneumonia.  COMPARISON: 11/21/2022 at 0850 hours.                                                                      IMPRESSION: Small caliber right chest tube is unchanged. Small right pleural effusion is unchanged. No visible pneumothorax. A moderate-sized left pleural effusion is similar. Bibasilar infiltrate, improved on the left when compared to previous.

## 2022-11-25 NOTE — PROGRESS NOTES
"Clinical Swallow Evaluation (CSE):       11/25/22 4790   Appointment Info   Signing Clinician's Name / Credentials (SLP) Debbie Ron MS CCC-SLP   General Information   Onset of Illness/Injury or Date of Surgery 11/12/22   Referring Physician Héctor Monge MD   Patient/Family Therapy Goal Statement (SLP) Pt did not state   Pertinent History of Current Problem   \"Blanco Osborne is a 57 year old White male with a past medical history significant for cirrhosis, NAFLD s/p transplant  (9/2016) who presents with cardiac arrest and respiratory failure.\" Current hospital problems include:  Parainfluenza type 1 infection; 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; Acquired immunocompromised state; Sepsis due to Streptococcus pneumoniae with acute hypoxic respiratory failure; Encounter for therapeutic drug monitoring; Aspergillus pneumonia; Respiratory arrest; Lactic acidosis; Acute renal failure, unspecified acute renal failure type; Embolic stroke. Intubated 11/12 - 11/20 and reintubated 11/21 - 11/24. SLP consulted post-extubation for swallow eval.     General Observations Pt alert and upright in chair, family present   Type of Evaluation   Type of Evaluation Swallow Evaluation   Oral Motor   Oral Musculature   (limited participation given nausea)   Vocal Quality/Secretion Management (Oral Motor)   Vocal Quality (Oral Motor) hoarse;hypophonic;breathy;harsh;wet/gurgly   Secretion Management (Oral Motor) difficulty swallowing secretions   Comment, Vocal Quality/Secretion Management (Oral Motor) requiring intermittent deep suction   General Swallowing Observations   Past History of Dysphagia None per EMR   Respiratory Support (General Swallowing Observations) oxygen mask  (7L)   Current Diet/Method of Nutritional Intake (General Swallowing Observations, NIS) NPO   Swallowing Evaluation Clinical swallow evaluation   Clinical Swallow Evaluation   Feeding Assistance dependent   Clinical Swallow Evaluation Textures Trialed thin " liquids   Clinical Swallow Eval: Thin Liquid Texture Trial   Mode of Presentation, Thin Liquids spoon   Volume of Liquid or Food Presented ice chip x1   Oral Phase of Swallow impaired mastication;delayed AP movement;effortful AP movement   Pharyngeal Phase of Swallow reduction in laryngeal movement;coughing/choking;wet vocal quality after swallow   Swallowing Recommendations   Diet Consistency Recommendations NPO;ice chips only  (x1-5 single ice chips)   Supervision Level for Intake 1:1 supervision needed   Medication Administration Recommendations, Swallowing (SLP) non-oral means   Instrumental Assessment Recommendations FEES (fiberoptic endoscopic evaluation of swallowing);VFSS (videofluoroscopic swallowing study)  (pending clinical improvements, secretion management)   General Therapy Interventions   Planned Therapy Interventions Dysphagia Treatment   Clinical Impression   Criteria for Skilled Therapeutic Interventions Met (SLP Eval) Yes, treatment indicated   SLP Diagnosis suspected pharyngeal dysphagia + high aspiration risk   Risks & Benefits of therapy have been explained evaluation/treatment results reviewed;care plan/treatment goals reviewed;risks/benefits reviewed;current/potential barriers reviewed;participants voiced agreement with care plan;participants included;patient   Clinical Impression Comments   Clinical swallow evaluation completed- limited given severity of deficits, high risk. Pt with severe dysphonia/hoarse vocal quality, significant inability to manage secretions. Ice chip x1 trialed with reduced oral manipulation, weak laryngeal elevation with cough/wet vocal quality (similar to prior to any PO as well though). Despite max encouragement/education, pt declined further trials. Education provided to pt/family re: current risk factors, NPO recs, SLP POC.     SLP Total Evaluation Time   Eval: oral/pharyngeal swallow function, clinical swallow Minutes (24189) 10   SLP Goals   Therapy Frequency  (SLP Eval) daily   SLP Predicted Duration/Target Date for Goal Attainment 12/02/22   SLP Goals Swallow   SLP: Safely tolerate diet without signs/symptoms of aspiration One P.O. texture   SLP Discharge Planning   SLP Plan PO readiness   SLP Discharge Recommendation [facility] patient will benefit from intensive, interdisciplinary therapy.  Anticipate will be able to tolerate 3 hours of therapy per day;Transitional Care Facility   SLP Rationale for DC Rec Pt significantly below baseline re: swallow function + communication function   SLP Brief overview of current status  NPO with exception of x1-5 ice chips via RN when alert/upright to facilitate meaningful swallows, secretion management. Do not anticipate PO readiness within the next 1-3 days, would consider alternative means of nutrition.   Total Session Time   Total Session Time (sum of timed and untimed services) 10

## 2022-11-25 NOTE — PROGRESS NOTES
[facility] Intensive Care Unit   Nursing Note                                                       Neuro:  PERRL.  Moves all extremities.  Follows commands.    Cardiovascular:  RRR.  Hemodynamically stable. A fib episode early in the shift, SR since.  Pulmonary:  Oxymask 3 L, coarse, crackles  GI/:  Anuric on CRRT, two smears.  Endocrine: Glucose well controlled.  Skin:  New skin tear, wound LDA charted and reported to day shift for WOC to see. Existing sacral pressure sore, bruising, mouth and right nare herpes sores.  Restraints:  Not in use or necessary.  AM labs noted  Family updated  Continue to monitor closely.    Recent Labs   Lab 11/23/22  1107 11/23/22  0442 11/22/22  1839 11/22/22  1545   PH 7.34* 7.47* 7.47* 7.49*   PCO2 43 32* 33* 32*   PO2 99 100 104 136*   HCO3 23 23 24 24   O2PER 30 40 40 40       No results found for: TROPI, TROPONIN, TROPR, TROPN    ROUTINE IP LABS (Last four results)  BMP  Recent Labs   Lab 11/25/22 0409 11/25/22  0038 11/24/22 2203 11/24/22 2017 11/24/22  2002 11/24/22  1428 11/24/22  1325 11/24/22  0433 11/24/22  0425     --   --   --  138  --  135  --  135   POTASSIUM 4.8  --   --   --  5.2  --  5.8*  --  5.7*   CHLORIDE 104  --   --   --  105  --  105  --  105   KELLY 8.4*  --   --   --  8.2*  --  8.0*  --  7.7*   CO2 26  --   --   --  25  --  24  --  24   BUN 58*  --   --   --  76*  --  83*  --  74*   CR 1.84*  --   --   --  2.30*  --  2.64*  --  2.38*   * 126* 128* 135* 143*   < > 159*   < > 165*    < > = values in this interval not displayed.     CBC  Recent Labs   Lab 11/25/22  0409 11/24/22  0425 11/23/22  0441 11/22/22  1200   WBC 12.2* 16.8* 18.6* 37.9*   RBC 2.78* 2.63* 2.64* 3.04*   HGB 9.4* 8.9* 9.0* 10.2*   HCT 30.0* 27.8* 27.4* 31.6*   * 106* 104* 104*   MCH 33.8* 33.8* 34.1* 33.6*   MCHC 31.3* 32.0 32.8 32.3   RDW 18.8* 18.5* 18.5* 18.6*   PLT 82* 84* 89* 190     INRNo lab results found in last 7 days.  LACTIC ACID  Lactic Acid  (mmol/L)   Date Value   11/13/2022 2.0   11/12/2022 5.8 (HH)   11/12/2022 7.0 (HH)   11/12/2022 6.9 (HH)   09/21/2016 2.5 (H)   09/21/2016 1.3   09/21/2016 1.6   09/21/2016 2.6 (H)     Blood Glucose  Glucose (mg/dL)   Date Value   09/28/2016 184 (H)   09/28/2016 143 (H)   09/27/2016 196 (H)   09/27/2016 174 (H)   09/27/2016 216 (H)   09/27/2016 220 (H)       Intake/Output Summary (Last 24 hours) at 11/25/2022 0737  Last data filed at 11/25/2022 0600  Gross per 24 hour   Intake 1026.17 ml   Output 906 ml   Net 120.17 ml       Renato Parra  [facility]  Intensive Care Unit

## 2022-11-25 NOTE — PROGRESS NOTES
Renal Medicine Progress Note            Assessment/Plan:   Blanco Osborne is a 57-year-old male with PMH CARRILLO s/p liver transplant (9/2016) and cirrhosis admitted 11/12/2022 with out of hospital PEA arrest and acute hypoxemic respiratory failure.  Course complicated by parainfluenza virus, streptococcal bacteremia, and LORETTA requiring CRRT.      # Severe anuric LORETTA:                -CRRT stopped 11/22 and resume on 11/24     # Septic shock with MODS:                -Rocephin/voriconazole/Valtrex     # Respiratory failure: Re-intubated 11/21g. CXR personally reviewed. 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 Mild hyperkalemia-resolved.      Plan:  # Resume CRRT. Goal net UF neg 100-200 ml/hr to help prevent him from re-intubated again. I discussed the plan with RN and ICU team in person.         Interval History:     CRRT had to be stopped for PT? And now there is air in the system.   RN is getting ready to resume CRRT again.   Patient is extubated.   Using 7 liters via facemask.   Brother is at the bedside.           Medications and Allergies:       acetylcysteine  2 mL Nebulization 4x Daily     acyclovir   Topical Q8H     artificial tears  1 drop Both Eyes TID     cefTRIAXone  2 g Intravenous Q24H     chlorhexidine  15 mL Mouth/Throat Q12H     heparin ANTICOAGULANT  5,000 Units Subcutaneous Q8H     heparin lock flush  5-20 mL Intracatheter Q24H     hydrocortisone sodium succinate PF  50 mg Intravenous Q6H     insulin aspart  1-12 Units Subcutaneous Q4H     insulin glargine  10 Units Subcutaneous QAM AC     ipratropium - albuterol 0.5 mg/2.5 mg/3 mL  3 mL Nebulization 4x daily     multivitamins w/minerals  15 mL Per Feeding Tube Daily     pantoprazole  40 mg Per Feeding Tube QAM AC    Or     pantoprazole  40 mg Intravenous QAM  AC     polyethylene glycol  17 g Oral Daily     senna-docusate  1 tablet Oral BID    Or     senna-docusate  2 tablet Oral BID     sodium chloride (PF)  10-40 mL Intracatheter Q8H     sodium chloride (PF)  10-40 mL Intracatheter Q8H     sodium chloride (PF) 0.9%  10 mL Intracatheter Once in dialysis/CRRT     sodium chloride (PF) 0.9%  10 mL Intracatheter Once in dialysis/CRRT     voriconazole  300 mg Intravenous Q12H      No Active Allergies         Physical Exam:   Vitals were reviewed   , Blood pressure 103/68, pulse 75, temperature 98.1  F (36.7  C), temperature source Oral, resp. rate 12, height 1.829 m (6'), weight 99.1 kg (218 lb 7.6 oz), SpO2 98 %.    Wt Readings from Last 3 Encounters:   11/24/22 99.1 kg (218 lb 7.6 oz)   10/07/19 137.5 kg (303 lb 3.2 oz)   10/04/19 131.5 kg (290 lb)       Intake/Output Summary (Last 24 hours) at 11/25/2022 1050  Last data filed at 11/25/2022 1000  Gross per 24 hour   Intake 783.57 ml   Output 1093 ml   Net -309.43 ml     GENERAL APPEARANCE: NAD. Looks tired.   HEENT:  Extubated. FM.   RESP: No wheezes.  CV: RRR, nl S1/S2  ABDOMEN: distended, soft.   EXTREMITIES/SKIN: not much edema at all.   NEURO: awake and following commands.  RIJ temp cvc          Data:     CBC RESULTS:     Recent Labs   Lab 11/25/22  0409 11/24/22  0425 11/23/22  0441 11/22/22  1200 11/22/22  0423 11/22/22  0025   WBC 12.2* 16.8* 18.6* 37.9* 42.8* 47.0*   RBC 2.78* 2.63* 2.64* 3.04* 2.84* 3.06*   HGB 9.4* 8.9* 9.0* 10.2* 9.6* 10.5*   HCT 30.0* 27.8* 27.4* 31.6* 29.9* 32.6*   PLT 82* 84* 89* 190 191 229       Basic Metabolic Panel:  Recent Labs   Lab 11/25/22  0812 11/25/22  0409 11/25/22  0038 11/24/22  2203 11/24/22  2017 11/24/22 2002 11/24/22  1428 11/24/22  1325 11/24/22  0433 11/24/22  0425 11/23/22  0442 11/23/22  0441 11/22/22  1204 11/22/22  1200   NA  --  135  --   --   --  138  --  135  --  135  --  136  --  135   POTASSIUM  --  4.8  --   --   --  5.2  --  5.8*  --  5.7*  --  4.8  --  4.4    CHLORIDE  --  104  --   --   --  105  --  105  --  105  --  106  --  107   CO2  --  26  --   --   --  25  --  24  --  24  --  23  --  21   BUN  --  58*  --   --   --  76*  --  83*  --  74*  --  53*  --  32*   CR  --  1.84*  --   --   --  2.30*  --  2.64*  --  2.38*  --  1.51*  --  0.95   * 140* 126* 128* 135* 143*   < > 159*   < > 165*   < > 263*   < > 185*   KELLY  --  8.4*  --   --   --  8.2*  --  8.0*  --  7.7*  --  7.6*  --  7.9*    < > = values in this interval not displayed.       INRNo lab results found in last 7 days.   Attestation:   I have reviewed today's relevant vital signs, notes, medications, labs and imaging.    Zenon Bradshaw MD  [facility] - Nephrology  Office phone :[phone]  Pager: [phone]

## 2022-11-25 NOTE — PROGRESS NOTES
11/25/22 5003   Appointment Info   Signing Clinician's Name / Credentials (PT) Brittany Dressler, PT   Living Environment   People in Home spouse   Current Living Arrangements house   Living Environment Comments From chart review, pt resides at home w/ spouse. Pt limited historian.   Self-Care   Usual Activity Tolerance moderate   Current Activity Tolerance poor   Equipment Currently Used at Home none   Activity/Exercise/Self-Care Comment Per chart review, independent w/ all ADLs/IADLs at baseline no AD, works from home.   General Information   Onset of Illness/Injury or Date of Surgery 11/12/22   Referring Physician Héctor Monge MD   Patient/Family Therapy Goals Statement (PT) To go home.   Pertinent History of Current Problem (include personal factors and/or comorbidities that impact the POC) PEA arrest and respiratory failure (now 6L NC), influenza, LORETTA on CRRT, septic shock with MODS, R pneumothorax, B pleural effusions, PNA, ESLD given CARRILLO s/p liver transplant.   Existing Precautions/Restrictions fall  (CRRT)   Cognition   Affect/Mental Status (Cognition) WFL   Follows Commands (Cognition) follows one-step commands;delayed response/completion   Pain Assessment   Patient Currently in Pain No   Posture    Posture Comments Impaired given deconditioning.   Range of Motion (ROM)   ROM Comment Impaired given deconditioning.   Strength (Manual Muscle Testing)   Strength Comments 6 LPM NC, grossly weakened with pt lift dependent, fair head control, poor trunk control, DF >= 3/5 B, hip abd/add 2/5 B. Weaker L grasp than R - pt able to pull up to sit up mildly once hands placed.   Transfers   Comment, (Transfers) Lindsey bed-chair with OT - appropriate.   Gait/Stairs (Locomotion)   Distance in Feet (Required for LE Total Joints) Non-amb.   Balance   Balance Comments Total assist unsupported sitting balance with fair head control. Supported sitting balance with L lean. Vitals stable with mild SOB with brief rest  break.   Sensory Examination   Sensory Perception Comments WFL   Coordination   Coordination Comments WFL   Muscle Tone   Muscle Tone Comments WFL   Clinical Impression   Criteria for Skilled Therapeutic Intervention Yes, treatment indicated   PT Diagnosis (PT) Impaired mobility impaired   Influenced by the following impairments Impaired strength, ROM, actiivty tolerance, balance.   Functional limitations due to impairments Impaired independent mobility & living   Clinical Presentation (PT Evaluation Complexity) Evolving/Changing   Clinical Presentation Rationale Fragile status   Clinical Decision Making (Complexity) moderate complexity   Planned Therapy Interventions (PT) balance training;bed mobility training;gait training;home exercise program;motor coordination training;neuromuscular re-education;patient/family education;postural re-education;stair training;strengthening;stretching;transfer training;progressive activity/exercise;risk factor education;home program guidelines;wheelchair management/propulsion training   Anticipated Equipment Needs at Discharge (PT) gait belt;walker, rolling;hospital bed;lift device;shower chair;commode chair   Risk & Benefits of therapy have been explained evaluation/treatment results reviewed;care plan/treatment goals reviewed;risks/benefits reviewed;participants included;patient;spouse/significant other;current/potential barriers reviewed;participants voiced agreement with care plan   PT Total Evaluation Time   PT Eval, Moderate Complexity Minutes (50812) 5   Physical Therapy Goals   PT Frequency 5x/week   PT Predicted Duration/Target Date for Goal Attainment 12/09/22   PT Goals Bed Mobility;Gait;Transfers;PT Goal 1   PT: Bed Mobility Minimal assist;Supine to/from sit;Rolling   PT: Transfers Moderate assist;Sit to/from stand;Bed to/from chair;Assistive device   PT: Gait Moderate assist;10 feet;Assistive device  (With O2 >= 88% on room air)   PT: Goal 1 Kelin unsupported sitting  balance   Interventions   Interventions Quick Adds Neuromuscular Re-ed;Therapeutic Procedure   Therapeutic Procedure/Exercise   Group Therapeutic Procedures Minutes (72557) 8   Symptoms Noted During/After Treatment fatigue   Treatment Detail/Skilled Intervention PT instructed pt and wife in APs, hip abd/add, SAQs, glut sets for HEP with good pt teach back with SBA for amplitude and technique cues, rapid onset neuromuscular fatigue.   Neuromuscular Re-education   Neuromuscular Re-Education Minutes (87565) 15   Symptoms Noted During/After Treatment fatigue   Treatment Detail/Skilled Intervention Pt agreeable to PT: Total assist lean forward to midline, Total assist unsupported midline with pt tendency to lean back and L with fair head control with postural control and weight-shifting cues, 3 bouts each lasting longer than the last wtih less posterior resistance, 1-2min bouts iwth mild SOB, vitals stable on 6L NC, intermiittent therapeutic rest breaks. Dependent repo in chair via sling, pillows for weak arms and to limit L lean. Finished with RN handoff.   PT Discharge Planning   PT Plan PT: strength, activity tolerance, balance progressions.   PT Discharge Recommendation (DC Rec) Transitional Care Facility   PT Rationale for DC Rec Pt below independent baseline, now Ax2 lift dependent - rehab warranted pending medical stability.   PT Brief overview of current status Total assist sitting balance with fair head control, LE MMT 2-3/5 MMT, R grasp better than L, 6L NC vitals stable, on CRRT during PT.   Total Session Time   Timed Code Treatment Minutes 15   Total Session Time (sum of timed and untimed services) 28

## 2022-11-26 ENCOUNTER — APPOINTMENT (OUTPATIENT)
Dept: GENERAL RADIOLOGY | Facility: CLINIC | Age: 57
DRG: 004 | End: 2022-11-26
Attending: INTERNAL MEDICINE
Payer: COMMERCIAL

## 2022-11-26 ENCOUNTER — ANESTHESIA EVENT (OUTPATIENT)
Dept: INTENSIVE CARE | Facility: CLINIC | Age: 57
DRG: 004 | End: 2022-11-26
Payer: COMMERCIAL

## 2022-11-26 ENCOUNTER — ANESTHESIA (OUTPATIENT)
Dept: INTENSIVE CARE | Facility: CLINIC | Age: 57
DRG: 004 | End: 2022-11-26
Payer: COMMERCIAL

## 2022-11-26 LAB
% LINING CELLS, BODY FLUID: 1 %
ALBUMIN SERPL-MCNC: 2.1 G/DL (ref 3.4–5)
ALBUMIN SERPL-MCNC: 2.3 G/DL (ref 3.4–5)
ALP SERPL-CCNC: 210 U/L (ref 40–150)
ALT SERPL W P-5'-P-CCNC: 37 U/L (ref 0–70)
ANION GAP SERPL CALCULATED.3IONS-SCNC: 6 MMOL/L (ref 3–14)
ANION GAP SERPL CALCULATED.3IONS-SCNC: 8 MMOL/L (ref 3–14)
APPEARANCE FLD: ABNORMAL
AST SERPL W P-5'-P-CCNC: 39 U/L (ref 0–45)
BASE EXCESS BLDV CALC-SCNC: -0.8 MMOL/L (ref -7.7–1.9)
BASE EXCESS BLDV CALC-SCNC: 0.1 MMOL/L (ref -7.7–1.9)
BILIRUB DIRECT SERPL-MCNC: 1.4 MG/DL (ref 0–0.2)
BILIRUB SERPL-MCNC: 1.6 MG/DL (ref 0.2–1.3)
BUN SERPL-MCNC: 37 MG/DL (ref 7–30)
BUN SERPL-MCNC: 40 MG/DL (ref 7–30)
CA-I BLD-MCNC: 4.5 MG/DL (ref 4.4–5.2)
CA-I BLD-MCNC: 5 MG/DL (ref 4.4–5.2)
CALCIUM SERPL-MCNC: 8.2 MG/DL (ref 8.5–10.1)
CALCIUM SERPL-MCNC: 8.7 MG/DL (ref 8.5–10.1)
CELL COUNT BODY FLUID SOURCE: ABNORMAL
CHLORIDE BLD-SCNC: 105 MMOL/L (ref 94–109)
CHLORIDE BLD-SCNC: 106 MMOL/L (ref 94–109)
CO2 SERPL-SCNC: 23 MMOL/L (ref 20–32)
CO2 SERPL-SCNC: 27 MMOL/L (ref 20–32)
COLOR FLD: ABNORMAL
CREAT SERPL-MCNC: 1.28 MG/DL (ref 0.66–1.25)
CREAT SERPL-MCNC: 1.29 MG/DL (ref 0.66–1.25)
ERYTHROCYTE [DISTWIDTH] IN BLOOD BY AUTOMATED COUNT: 18.5 % (ref 10–15)
ERYTHROCYTE [DISTWIDTH] IN BLOOD BY AUTOMATED COUNT: 18.8 % (ref 10–15)
GFR SERPL CREATININE-BSD FRML MDRD: 64 ML/MIN/1.73M2
GFR SERPL CREATININE-BSD FRML MDRD: 65 ML/MIN/1.73M2
GLUCOSE BLD-MCNC: 120 MG/DL (ref 70–99)
GLUCOSE BLD-MCNC: 123 MG/DL (ref 70–99)
GLUCOSE BLDC GLUCOMTR-MCNC: 102 MG/DL (ref 70–99)
GLUCOSE BLDC GLUCOMTR-MCNC: 105 MG/DL (ref 70–99)
GLUCOSE BLDC GLUCOMTR-MCNC: 106 MG/DL (ref 70–99)
GLUCOSE BLDC GLUCOMTR-MCNC: 88 MG/DL (ref 70–99)
GLUCOSE BLDC GLUCOMTR-MCNC: 93 MG/DL (ref 70–99)
GLUCOSE BODY FLUID SOURCE: NORMAL
GLUCOSE FLD-MCNC: 90 MG/DL
HCO3 BLDV-SCNC: 26 MMOL/L (ref 21–28)
HCO3 BLDV-SCNC: 27 MMOL/L (ref 21–28)
HCT VFR BLD AUTO: 31 % (ref 40–53)
HCT VFR BLD AUTO: 33 % (ref 40–53)
HGB BLD-MCNC: 10.5 G/DL (ref 13.3–17.7)
HGB BLD-MCNC: 9.5 G/DL (ref 13.3–17.7)
LD BODY BODY FLUID SOURCE: NORMAL
LDH FLD L TO P-CCNC: 729 U/L
LDH SERPL L TO P-CCNC: 200 U/L (ref 85–227)
LDH SERPL L TO P-CCNC: 204 U/L (ref 85–227)
LYMPHOCYTES NFR FLD MANUAL: 11 %
MAGNESIUM SERPL-MCNC: 1.9 MG/DL (ref 1.6–2.3)
MAGNESIUM SERPL-MCNC: 1.9 MG/DL (ref 1.6–2.3)
MCH RBC QN AUTO: 33.7 PG (ref 26.5–33)
MCH RBC QN AUTO: 33.8 PG (ref 26.5–33)
MCHC RBC AUTO-ENTMCNC: 30.6 G/DL (ref 31.5–36.5)
MCHC RBC AUTO-ENTMCNC: 31.8 G/DL (ref 31.5–36.5)
MCV RBC AUTO: 106 FL (ref 78–100)
MCV RBC AUTO: 110 FL (ref 78–100)
MONOS+MACROS NFR FLD MANUAL: 35 %
NEUTS BAND NFR FLD MANUAL: 53 %
O2/TOTAL GAS SETTING VFR VENT: 60 %
O2/TOTAL GAS SETTING VFR VENT: 70 %
OXYHGB MFR BLDV: 60 % (ref 70–75)
OXYHGB MFR BLDV: 67 % (ref 70–75)
PCO2 BLDV: 49 MM HG (ref 40–50)
PCO2 BLDV: 59 MM HG (ref 40–50)
PH BLDV: 7.26 [PH] (ref 7.32–7.43)
PH BLDV: 7.34 [PH] (ref 7.32–7.43)
PHOSPHATE SERPL-MCNC: 4.5 MG/DL (ref 2.5–4.5)
PHOSPHATE SERPL-MCNC: 4.5 MG/DL (ref 2.5–4.5)
PLATELET # BLD AUTO: 129 10E3/UL (ref 150–450)
PLATELET # BLD AUTO: 129 10E3/UL (ref 150–450)
PO2 BLDV: 37 MM HG (ref 25–47)
PO2 BLDV: 38 MM HG (ref 25–47)
POTASSIUM BLD-SCNC: 4.2 MMOL/L (ref 3.4–5.3)
POTASSIUM BLD-SCNC: 4.3 MMOL/L (ref 3.4–5.3)
PROT FLD-MCNC: 2.2 G/DL
PROT SERPL-MCNC: 5.7 G/DL (ref 6.8–8.8)
PROT SERPL-MCNC: 6 G/DL (ref 6.8–8.8)
PROTEIN BODY FLUID SOURCE: NORMAL
RBC # BLD AUTO: 2.82 10E6/UL (ref 4.4–5.9)
RBC # BLD AUTO: 3.11 10E6/UL (ref 4.4–5.9)
SODIUM SERPL-SCNC: 137 MMOL/L (ref 133–144)
SODIUM SERPL-SCNC: 138 MMOL/L (ref 133–144)
WBC # BLD AUTO: 17.7 10E3/UL (ref 4–11)
WBC # BLD AUTO: 20.3 10E3/UL (ref 4–11)
WBC # FLD AUTO: 1944 /UL

## 2022-11-26 PROCEDURE — 84157 ASSAY OF PROTEIN OTHER: CPT | Performed by: INTERNAL MEDICINE

## 2022-11-26 PROCEDURE — 83735 ASSAY OF MAGNESIUM: CPT | Performed by: INTERNAL MEDICINE

## 2022-11-26 PROCEDURE — 83615 LACTATE (LD) (LDH) ENZYME: CPT | Performed by: INTERNAL MEDICINE

## 2022-11-26 PROCEDURE — 250N000011 HC RX IP 250 OP 636: Performed by: INTERNAL MEDICINE

## 2022-11-26 PROCEDURE — 82248 BILIRUBIN DIRECT: CPT | Performed by: INTERNAL MEDICINE

## 2022-11-26 PROCEDURE — 258N000003 HC RX IP 258 OP 636: Performed by: INTERNAL MEDICINE

## 2022-11-26 PROCEDURE — 250N000009 HC RX 250: Performed by: INTERNAL MEDICINE

## 2022-11-26 PROCEDURE — 94668 MNPJ CHEST WALL SBSQ: CPT

## 2022-11-26 PROCEDURE — 87205 SMEAR GRAM STAIN: CPT | Performed by: INTERNAL MEDICINE

## 2022-11-26 PROCEDURE — 250N000011 HC RX IP 250 OP 636: Performed by: STUDENT IN AN ORGANIZED HEALTH CARE EDUCATION/TRAINING PROGRAM

## 2022-11-26 PROCEDURE — 84450 TRANSFERASE (AST) (SGOT): CPT | Performed by: INTERNAL MEDICINE

## 2022-11-26 PROCEDURE — 85014 HEMATOCRIT: CPT | Performed by: INTERNAL MEDICINE

## 2022-11-26 PROCEDURE — 87070 CULTURE OTHR SPECIMN AEROBIC: CPT | Performed by: INTERNAL MEDICINE

## 2022-11-26 PROCEDURE — 99232 SBSQ HOSP IP/OBS MODERATE 35: CPT | Performed by: INTERNAL MEDICINE

## 2022-11-26 PROCEDURE — 71045 X-RAY EXAM CHEST 1 VIEW: CPT | Mod: 77

## 2022-11-26 PROCEDURE — 99233 SBSQ HOSP IP/OBS HIGH 50: CPT | Performed by: SPECIALIST

## 2022-11-26 PROCEDURE — C9113 INJ PANTOPRAZOLE SODIUM, VIA: HCPCS | Performed by: STUDENT IN AN ORGANIZED HEALTH CARE EDUCATION/TRAINING PROGRAM

## 2022-11-26 PROCEDURE — 93010 ELECTROCARDIOGRAM REPORT: CPT | Performed by: INTERNAL MEDICINE

## 2022-11-26 PROCEDURE — 80053 COMPREHEN METABOLIC PANEL: CPT | Performed by: INTERNAL MEDICINE

## 2022-11-26 PROCEDURE — 82330 ASSAY OF CALCIUM: CPT | Performed by: INTERNAL MEDICINE

## 2022-11-26 PROCEDURE — 71045 X-RAY EXAM CHEST 1 VIEW: CPT

## 2022-11-26 PROCEDURE — 250N000011 HC RX IP 250 OP 636: Performed by: SURGERY

## 2022-11-26 PROCEDURE — 999N000157 HC STATISTIC RCP TIME EA 10 MIN

## 2022-11-26 PROCEDURE — 250N000013 HC RX MED GY IP 250 OP 250 PS 637: Performed by: STUDENT IN AN ORGANIZED HEALTH CARE EDUCATION/TRAINING PROGRAM

## 2022-11-26 PROCEDURE — 94640 AIRWAY INHALATION TREATMENT: CPT | Mod: 76

## 2022-11-26 PROCEDURE — 82945 GLUCOSE OTHER FLUID: CPT | Performed by: INTERNAL MEDICINE

## 2022-11-26 PROCEDURE — 370N000003 HC ANESTHESIA WARD SERVICE

## 2022-11-26 PROCEDURE — 94799 UNLISTED PULMONARY SVC/PX: CPT

## 2022-11-26 PROCEDURE — 200N000001 HC R&B ICU

## 2022-11-26 PROCEDURE — P9045 ALBUMIN (HUMAN), 5%, 250 ML: HCPCS | Performed by: INTERNAL MEDICINE

## 2022-11-26 PROCEDURE — 82247 BILIRUBIN TOTAL: CPT | Performed by: INTERNAL MEDICINE

## 2022-11-26 PROCEDURE — 84100 ASSAY OF PHOSPHORUS: CPT | Performed by: INTERNAL MEDICINE

## 2022-11-26 PROCEDURE — 999N000065 XR CHEST PORT 1 VIEW

## 2022-11-26 PROCEDURE — 94660 CPAP INITIATION&MGMT: CPT

## 2022-11-26 PROCEDURE — 0W9B3ZZ DRAINAGE OF LEFT PLEURAL CAVITY, PERCUTANEOUS APPROACH: ICD-10-PCS | Performed by: INTERNAL MEDICINE

## 2022-11-26 PROCEDURE — 250N000011 HC RX IP 250 OP 636: Performed by: SPECIALIST

## 2022-11-26 PROCEDURE — 93005 ELECTROCARDIOGRAM TRACING: CPT

## 2022-11-26 PROCEDURE — 99291 CRITICAL CARE FIRST HOUR: CPT | Performed by: INTERNAL MEDICINE

## 2022-11-26 PROCEDURE — 250N000011 HC RX IP 250 OP 636: Performed by: NURSE ANESTHETIST, CERTIFIED REGISTERED

## 2022-11-26 PROCEDURE — 89051 BODY FLUID CELL COUNT: CPT | Performed by: INTERNAL MEDICINE

## 2022-11-26 PROCEDURE — 82805 BLOOD GASES W/O2 SATURATION: CPT | Performed by: INTERNAL MEDICINE

## 2022-11-26 PROCEDURE — 84155 ASSAY OF PROTEIN SERUM: CPT | Performed by: INTERNAL MEDICINE

## 2022-11-26 PROCEDURE — 94002 VENT MGMT INPAT INIT DAY: CPT

## 2022-11-26 PROCEDURE — 32555 ASPIRATE PLEURA W/ IMAGING: CPT | Performed by: INTERNAL MEDICINE

## 2022-11-26 PROCEDURE — 250N000013 HC RX MED GY IP 250 OP 250 PS 637: Performed by: INTERNAL MEDICINE

## 2022-11-26 PROCEDURE — 84075 ASSAY ALKALINE PHOSPHATASE: CPT | Performed by: INTERNAL MEDICINE

## 2022-11-26 RX ORDER — PROPOFOL 10 MG/ML
INJECTION, EMULSION INTRAVENOUS PRN
Status: DISCONTINUED | OUTPATIENT
Start: 2022-11-26 | End: 2022-11-26

## 2022-11-26 RX ORDER — ACETYLCYSTEINE 200 MG/ML
2 SOLUTION ORAL; RESPIRATORY (INHALATION) 4 TIMES DAILY
Status: COMPLETED | OUTPATIENT
Start: 2022-11-26 | End: 2022-11-29

## 2022-11-26 RX ADMIN — PHENYLEPHRINE HYDROCHLORIDE 1 MCG/KG/MIN: 10 INJECTION INTRAVENOUS at 21:54

## 2022-11-26 RX ADMIN — CALCIUM CHLORIDE, MAGNESIUM CHLORIDE, DEXTROSE MONOHYDRATE, LACTIC ACID, SODIUM CHLORIDE, SODIUM BICARBONATE AND POTASSIUM CHLORIDE 5000 ML: 5.15; 2.03; 22; 5.4; 6.46; 3.09; .157 INJECTION INTRAVENOUS at 06:59

## 2022-11-26 RX ADMIN — IPRATROPIUM BROMIDE AND ALBUTEROL SULFATE 3 ML: .5; 3 SOLUTION RESPIRATORY (INHALATION) at 19:04

## 2022-11-26 RX ADMIN — CALCIUM CHLORIDE, MAGNESIUM CHLORIDE, DEXTROSE MONOHYDRATE, LACTIC ACID, SODIUM CHLORIDE, SODIUM BICARBONATE AND POTASSIUM CHLORIDE 5000 ML: 5.15; 2.03; 22; 5.4; 6.46; 3.09; .157 INJECTION INTRAVENOUS at 08:22

## 2022-11-26 RX ADMIN — HYDROCORTISONE SODIUM SUCCINATE 50 MG: 100 INJECTION, POWDER, FOR SOLUTION INTRAMUSCULAR; INTRAVENOUS at 18:26

## 2022-11-26 RX ADMIN — HEPARIN SODIUM 5000 UNITS: 5000 INJECTION, SOLUTION INTRAVENOUS; SUBCUTANEOUS at 15:17

## 2022-11-26 RX ADMIN — SODIUM CHLORIDE, PRESERVATIVE FREE 10 ML: 5 INJECTION INTRAVENOUS at 16:48

## 2022-11-26 RX ADMIN — PROPOFOL 100 MG: 10 INJECTION, EMULSION INTRAVENOUS at 12:36

## 2022-11-26 RX ADMIN — SENNOSIDES AND DOCUSATE SODIUM 2 TABLET: 50; 8.6 TABLET ORAL at 20:01

## 2022-11-26 RX ADMIN — Medication 10 ML: at 10:47

## 2022-11-26 RX ADMIN — IPRATROPIUM BROMIDE AND ALBUTEROL SULFATE 3 ML: .5; 3 SOLUTION RESPIRATORY (INHALATION) at 16:35

## 2022-11-26 RX ADMIN — ALBUMIN HUMAN 25 G: 0.05 INJECTION, SOLUTION INTRAVENOUS at 10:13

## 2022-11-26 RX ADMIN — HEPARIN SODIUM 5000 UNITS: 5000 INJECTION, SOLUTION INTRAVENOUS; SUBCUTANEOUS at 06:45

## 2022-11-26 RX ADMIN — CHLORHEXIDINE GLUCONATE 0.12% ORAL RINSE 15 ML: 1.2 LIQUID ORAL at 20:01

## 2022-11-26 RX ADMIN — Medication 1 DROP: at 21:54

## 2022-11-26 RX ADMIN — ACETYLCYSTEINE 2 ML: 200 SOLUTION ORAL; RESPIRATORY (INHALATION) at 16:35

## 2022-11-26 RX ADMIN — ACYCLOVIR: 50 OINTMENT TOPICAL at 06:48

## 2022-11-26 RX ADMIN — Medication 1 DROP: at 08:28

## 2022-11-26 RX ADMIN — CALCIUM CHLORIDE, MAGNESIUM CHLORIDE, DEXTROSE MONOHYDRATE, LACTIC ACID, SODIUM CHLORIDE, SODIUM BICARBONATE AND POTASSIUM CHLORIDE: 5.15; 2.03; 22; 5.4; 6.46; 3.09; .157 INJECTION INTRAVENOUS at 04:59

## 2022-11-26 RX ADMIN — DILTIAZEM HYDROCHLORIDE 5 MG/HR: 5 INJECTION, SOLUTION INTRAVENOUS at 06:45

## 2022-11-26 RX ADMIN — ACETYLCYSTEINE 2 ML: 200 SOLUTION ORAL; RESPIRATORY (INHALATION) at 08:03

## 2022-11-26 RX ADMIN — ACETYLCYSTEINE 2 ML: 200 SOLUTION ORAL; RESPIRATORY (INHALATION) at 19:04

## 2022-11-26 RX ADMIN — IPRATROPIUM BROMIDE AND ALBUTEROL SULFATE 3 ML: .5; 3 SOLUTION RESPIRATORY (INHALATION) at 13:28

## 2022-11-26 RX ADMIN — CALCIUM CHLORIDE, MAGNESIUM CHLORIDE, DEXTROSE MONOHYDRATE, LACTIC ACID, SODIUM CHLORIDE, SODIUM BICARBONATE AND POTASSIUM CHLORIDE 5000 ML: 5.15; 2.03; 22; 5.4; 6.46; 3.09; .157 INJECTION INTRAVENOUS at 04:59

## 2022-11-26 RX ADMIN — HYDROCORTISONE SODIUM SUCCINATE 50 MG: 100 INJECTION, POWDER, FOR SOLUTION INTRAMUSCULAR; INTRAVENOUS at 12:51

## 2022-11-26 RX ADMIN — ACYCLOVIR: 50 OINTMENT TOPICAL at 22:39

## 2022-11-26 RX ADMIN — HEPARIN SODIUM 5000 UNITS: 5000 INJECTION, SOLUTION INTRAVENOUS; SUBCUTANEOUS at 21:54

## 2022-11-26 RX ADMIN — PANTOPRAZOLE SODIUM 40 MG: 40 INJECTION, POWDER, FOR SOLUTION INTRAVENOUS at 06:44

## 2022-11-26 RX ADMIN — ACYCLOVIR: 50 OINTMENT TOPICAL at 15:17

## 2022-11-26 RX ADMIN — PROPOFOL 5 MCG/KG/MIN: 10 INJECTION, EMULSION INTRAVENOUS at 12:39

## 2022-11-26 RX ADMIN — CEFTRIAXONE SODIUM 2 G: 2 INJECTION, POWDER, FOR SOLUTION INTRAMUSCULAR; INTRAVENOUS at 04:19

## 2022-11-26 RX ADMIN — Medication 1 DROP: at 15:17

## 2022-11-26 RX ADMIN — HYDROCORTISONE SODIUM SUCCINATE 50 MG: 100 INJECTION, POWDER, FOR SOLUTION INTRAMUSCULAR; INTRAVENOUS at 00:12

## 2022-11-26 RX ADMIN — IPRATROPIUM BROMIDE AND ALBUTEROL SULFATE 3 ML: .5; 3 SOLUTION RESPIRATORY (INHALATION) at 08:03

## 2022-11-26 RX ADMIN — ACETYLCYSTEINE 2 ML: 200 SOLUTION ORAL; RESPIRATORY (INHALATION) at 11:00

## 2022-11-26 RX ADMIN — HYDROCORTISONE SODIUM SUCCINATE 50 MG: 100 INJECTION, POWDER, FOR SOLUTION INTRAMUSCULAR; INTRAVENOUS at 06:45

## 2022-11-26 RX ADMIN — HYDROMORPHONE HYDROCHLORIDE 0.4 MG: 0.2 INJECTION, SOLUTION INTRAMUSCULAR; INTRAVENOUS; SUBCUTANEOUS at 20:01

## 2022-11-26 ASSESSMENT — ACTIVITIES OF DAILY LIVING (ADL)
ADLS_ACUITY_SCORE: 49

## 2022-11-26 NOTE — PROGRESS NOTES
Renal Medicine Progress Note            Assessment/Plan:     Blanco Osborne is a 57-year-old male with PMH CARRILLO s/p liver transplant (9/2016) and cirrhosis admitted 11/12/2022 with out of hospital PEA arrest and acute hypoxemic respiratory failure.  Course complicated by parainfluenza virus, streptococcal bacteremia, and LORETTA requiring CRRT.      # Severe anuric LORETTA:                -CRRT stopped 11/22 and resume on 11/24     # Septic shock with MODS:         -phenylephrine     # Respiratory failure: Re-intubated 11/21g. CXR personally reviewed. 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 Hypotensive.      Plan:  # Can stop CRRT when he gets thoracentesis. No need to resume CRRT. Will evaluate daily for RRT.      I discussed the case with Dr. Turner and the ICU team in person.         Interval History:     Afebrile and not hypothermic.  On warming blanket?  BP is soft.   Phenylephrine increased to 0.9 mcg.   FIO at 37% via HF.  CXR with complete opacification of the left lung.  Net negative 1.9 liters since last 24 hrs.   CRRT running with no UF current due to hyptension.   Dilt stopped.             Medications and Allergies:       acetylcysteine  2 mL Nebulization 4x Daily     acyclovir   Topical Q8H     artificial tears  1 drop Both Eyes TID     cefTRIAXone  2 g Intravenous Q24H     chlorhexidine  15 mL Mouth/Throat Q12H     diltiazem  30 mg Oral or Feeding Tube Q6H [facility]     heparin ANTICOAGULANT  5,000 Units Subcutaneous Q8H     heparin lock flush  5-20 mL Intracatheter Q24H     hydrocortisone sodium succinate PF  50 mg Intravenous Q6H     insulin aspart  1-12 Units Subcutaneous Q4H     insulin glargine  10 Units Subcutaneous QAM AC     ipratropium - albuterol 0.5 mg/2.5 mg/3 mL  3 mL Nebulization 4x daily     lactated ringers   1,000 mL Intravenous Once     multivitamins w/minerals  15 mL Per Feeding Tube Daily     pantoprazole  40 mg Per Feeding Tube QAM AC    Or     pantoprazole  40 mg Intravenous QAM AC     polyethylene glycol  17 g Oral Daily     senna-docusate  1 tablet Oral BID    Or     senna-docusate  2 tablet Oral BID     sodium chloride (PF)  10-40 mL Intracatheter Q8H     sodium chloride (PF)  10-40 mL Intracatheter Q8H     sodium chloride (PF) 0.9%  10 mL Intracatheter Once in dialysis/CRRT     sodium chloride (PF) 0.9%  10 mL Intracatheter Once in dialysis/CRRT      No Active Allergies         Physical Exam:   Vitals were reviewed   , Blood pressure 117/73, pulse 68, temperature 97.1  F (36.2  C), temperature source Oral, resp. rate 14, height 1.829 m (6'), weight 99.1 kg (218 lb 7.6 oz), SpO2 94 %.    Wt Readings from Last 3 Encounters:   11/24/22 99.1 kg (218 lb 7.6 oz)   10/07/19 137.5 kg (303 lb 3.2 oz)   10/04/19 131.5 kg (290 lb)       Intake/Output Summary (Last 24 hours) at 11/26/2022 0933  Last data filed at 11/26/2022 0900  Gross per 24 hour   Intake 603.84 ml   Output 2281 ml   Net -1677.16 ml     GENERAL APPEARANCE: NAD. Looks tired.   HEENT:  Extubated. FM.   RESP: No wheezes. No BS to the left lung.   CV: RRR, nl S1/S2  ABDOMEN: distended, soft.   EXTREMITIES/SKIN: No edema.   NEURO: Sleeping  RIJ temp cvc          Data:     CBC RESULTS:     Recent Labs   Lab 11/26/22  0403 11/25/22  1947 11/25/22  1150 11/25/22  0409 11/24/22  0425 11/23/22  0441   WBC 20.3* 21.8* 14.1* 12.2* 16.8* 18.6*   RBC 3.11* 3.16* 2.84* 2.78* 2.63* 2.64*   HGB 10.5* 10.6* 9.5* 9.4* 8.9* 9.0*   HCT 33.0* 34.0* 30.3* 30.0* 27.8* 27.4*   * 192 110* 82* 84* 89*       Basic Metabolic Panel:  Recent Labs   Lab 11/26/22  0853 11/26/22  0411 11/26/22  0403 11/26/22  0016 11/25/22  1953 11/25/22  1947 11/25/22  1654 11/25/22  1150 11/25/22  0812 11/25/22  0409 11/24/22  2017 11/24/22  2002 11/24/22  1428 11/24/22  1325   NA  --   --  138   --   --  136  --  139  --  135  --  138  --  135   POTASSIUM  --   --  4.3  --   --  4.7  --  4.8  --  4.8  --  5.2  --  5.8*   CHLORIDE  --   --  105  --   --  104  --  106  --  104  --  105  --  105   CO2  --   --  27  --   --  24  --  23  --  26  --  25  --  24   BUN  --   --  40*  --   --  48*  --  58*  --  58*  --  76*  --  83*   CR  --   --  1.28*  --   --  1.43*  --  1.79*  --  1.84*  --  2.30*  --  2.64*   * 105* 120* 106* 151* 151*   < > 140*   < > 140*   < > 143*   < > 159*   KELLY  --   --  8.7  --   --  9.1  --  7.9*  --  8.4*  --  8.2*  --  8.0*    < > = values in this interval not displayed.       INRNo lab results found in last 7 days.   Attestation:   I have reviewed today's relevant vital signs, notes, medications, labs and imaging.    Zenon Bradshaw MD  [facility] - Nephrology  Office phone :[phone]  Pager: [phone]

## 2022-11-26 NOTE — PROGRESS NOTES
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 Acute respiratory failure -  He had been extubated 2 days ago did ok on NC oxygen for 1 day and over the past 24 hours has been slowly deteriorating. On Assessment today, despite thoracentesis, he was ventilating poorly and was reintubated. I spoke with wife via phone and siblings at bedside and feel trach/PEG would be appropriate at this time. His pleural studies from today are mixed but my impression is that pleural effusions are more likely transudative, from acute/chronic SIRS and renal failure.   2. Pneumonia - strep and parainfluenza; on Rocephin and will complete 14 day course of therapy   3. ID- voriconazole off now; complete 14 days of Rocephin for CAP  4. Shock - he remains on phenylephrine at 0.7 to 0.9; monitor expectantly.   4. Acute renal failure -  CRRT and HD based on what he is able to tolerate.    5. S/p initial arrest and prolonged resuscitation (11.9) and hypothermia resuscitation   6. History of Liver Transplant 2016 - off tacrolimus but on steroids; Discussed with Liver Tx service and appreciate input- maintain him on at least the equivalent of prednisone 10 mgms/day. We will likely resume tacrolimus soon.   7. History of HTN  8. CHRISTIAN on BIPAP  9. Nutrition - enteral resumed   10. CNS- though awake and following commands, long term  "CNS prognosis is guarded. MRI  11/17 looks \"worse than what we have seen from him thus far\"             Summary/Hospital Course:           Assessment and plan :     Blanco Osborne IS a 57 year old male admitted on 11/12/2022 for acute respiratory failure.   I have personally reviewed the daily labs, imaging studies, cultures and discussed the case with referring physician and consulting physicians.     My assessment and plan by system for this patient is as follows:    Neurology/Psychiatry:   1. When \"light\" he follows commands    Cardiovascular:   1.Hemodynamics - compensated but still requiring phenylephrine     Pulmonary/Ventilator Management:   1. After re-intubation, will titrate FIO2 and PEEP down as able. I suspect atelectasis and a large amounts of pleural effusion (likely transudates)                              GI and Nutrition :   1. Resume enteral     Renal/Fluids/Electrolytes:   1. He remains dialysis dependent     Infectious Disease:   1. As above, will complete 2 weeks of antibiotics treatment    Endocrine:   1. Ok on coverage at this time    Hematology/Oncology:   1. Hb 9.5     IV/Access:   1. Venous access -   2. Arterial access -   3.  Plan  - central access required and necessary      ICU Prophylaxis:   1. DVT: Hep Subq/ LMWH/mechanical  2. VAP: HOB 30 degrees, chlorhexidine rinse  3. Stress Ulcer: PPI/H2 blocker  4. Restraints: Nonviolent soft two point restraints required and necessary for patient safety and continued cares and good effect as patient continues to pull at necessary lines, tubes despite education and distraction. Will readdress daily.   5. IV Access - central access required and necessary for continued patient cares  6. Feeding - enteral   7. Family Update: discussed with siblings at bedside and wife via phone   8. Disposition - ICU care         Key goals for next 24 hours:   1. Intubated again today  2. Pulmonary hygiene measures   3.               Interim History:             "  Key Medications:       acetylcysteine  2 mL Nebulization 4x Daily     acyclovir   Topical Q8H     artificial tears  1 drop Both Eyes TID     cefTRIAXone  2 g Intravenous Q24H     chlorhexidine  15 mL Mouth/Throat Q12H     heparin ANTICOAGULANT  5,000 Units Subcutaneous Q8H     heparin lock flush  5-20 mL Intracatheter Q24H     hydrocortisone sodium succinate PF  50 mg Intravenous Q6H     insulin aspart  1-12 Units Subcutaneous Q4H     insulin glargine  10 Units Subcutaneous QAM AC     ipratropium - albuterol 0.5 mg/2.5 mg/3 mL  3 mL Nebulization 4x daily     multivitamins w/minerals  15 mL Per Feeding Tube Daily     pantoprazole  40 mg Per Feeding Tube QAM AC    Or     pantoprazole  40 mg Intravenous QAM AC     polyethylene glycol  17 g Oral Daily     senna-docusate  1 tablet Oral BID    Or     senna-docusate  2 tablet Oral BID     sodium chloride (PF)  10-40 mL Intracatheter Q8H     sodium chloride (PF)  10-40 mL Intracatheter Q8H     sodium chloride (PF) 0.9%  10 mL Intracatheter Once in dialysis/CRRT     sodium chloride (PF) 0.9%  10 mL Intracatheter Once in dialysis/CRRT       dexmedetomidine Stopped (11/25/22 1827)     dextrose       CRRT replacement solution       fentaNYL Stopped (11/23/22 1616)     - MEDICATION INSTRUCTIONS -       - MEDICATION INSTRUCTIONS -       - MEDICATION INSTRUCTIONS -       norepinephrine       phenylephrine 0.9 mcg/kg/min (11/26/22 0932)     CRRT replacement solution 200 mL/hr at 11/26/22 0749     CRRT replacement solution       propofol 5 mcg/kg/min (11/26/22 1239)     sodium chloride 10 mL/hr at 11/26/22 0757     sodium chloride 0 mL/hr at 11/22/22 0742     vasopressin                 Physical Examination:   Temp:  [96.2  F (35.7  C)-97.1  F (36.2  C)] 96.2  F (35.7  C)  Pulse:  [] 68  Resp:  [9-32] 14  BP: ()/() 117/73  FiO2 (%):  [40 %-70 %] 70 %  SpO2:  [73 %-100 %] 94 %    Intake/Output Summary (Last 24 hours) at 11/26/2022 1308  Last data filed at  11/26/2022 0900  Gross per 24 hour   Intake 463.84 ml   Output 1680 ml   Net -1216.16 ml     Wt Readings from Last 4 Encounters:   11/24/22 99.1 kg (218 lb 7.6 oz)   10/07/19 137.5 kg (303 lb 3.2 oz)   10/04/19 131.5 kg (290 lb)   02/20/19 140.4 kg (309 lb 9.6 oz)     BP - Mean:  [] 92  FiO2 (%): 70 % (12/6)  Resp: 14    Recent Labs   Lab 11/26/22  1121 11/25/22  1947 11/23/22  1107 11/23/22  0442 11/22/22  1839 11/22/22  1545   PH  --   --  7.34* 7.47* 7.47* 7.49*   PCO2  --   --  43 32* 33* 32*   PO2  --   --  99 100 104 136*   HCO3  --   --  23 23 24 24   O2PER 60 45 30 40 40 40       GEN: no acute distress; comfortable on ventilator    HEENT: head ncat, sclera anicteric, OP patent, trachea midline   PULM: unlabored synchronous with vent, clear anteriorly    CV/COR: RRR S1S2 no gallop,  No rub, no murmur  ABD: soft nontender, obese  EXT:  Mild to mod edema   warm  NEURO: moves extremities to command; very lethargic and suspect due to respiratory failure   SKIN: no obvious rash; no cyanosis or mottling   LINES: clean, dry intact         Data:   All data and imaging reviewed     ROUTINE ICU LABS (Last four results)  CMP  Recent Labs   Lab 11/26/22  1121 11/26/22  0853 11/26/22  0411 11/26/22  0403 11/25/22  1953 11/25/22  1947 11/25/22  1654 11/25/22  1150 11/25/22  0812 11/25/22  0409 11/23/22  0442 11/23/22  0441 11/22/22  0427 11/22/22  0423     --   --  138  --  136  --  139  --  135   < > 136   < > 137   POTASSIUM 4.2  --   --  4.3  --  4.7  --  4.8  --  4.8   < > 4.8   < > 4.4   CHLORIDE 106  --   --  105  --  104  --  106  --  104   < > 106   < > 107   CO2 23  --   --  27  --  24  --  23  --  26   < > 23   < > 25   ANIONGAP 8  --   --  6  --  8  --  10  --  5   < > 7   < > 5   * 102* 105* 120*   < > 151*   < > 140*   < > 140*   < > 263*   < > 168*   BUN 37*  --   --  40*  --  48*  --  58*  --  58*   < > 53*   < > 30   CR 1.29*  --   --  1.28*  --  1.43*  --  1.79*  --  1.84*   < > 1.51*    < > 0.94   GFRESTIMATED 64  --   --  65  --  57*  --  43*  --  42*   < > 53*   < > >90   KELLY 8.2*  --   --  8.7  --  9.1  --  7.9*  --  8.4*   < > 7.6*   < > 7.9*   MAG 1.9  --   --  1.9  --  2.1  --  2.1  --  2.2   < >  --    < > 2.3   PHOS 4.5  --   --  4.5  --  4.9*  --  5.9*  --  5.6*   < >  --    < > 3.9   PROTTOTAL 5.7*  --   --  6.0*  --   --   --   --   --  5.6*  --  5.1*  --  5.5*   ALBUMIN 2.3*  --   --  2.1*  --  2.1*  --  1.9*  --  1.9*   < > 1.8*   < > 1.9*   BILITOTAL  --   --   --  1.6*  --   --   --   --   --  1.5*  --  1.2  --  1.5*   ALKPHOS  --   --   --  210*  --   --   --   --   --  192*  --  201*  --  198*   AST  --   --   --  39  --   --   --   --   --  34  --  26  --  40   ALT  --   --   --  37  --   --   --   --   --  34  --  31  --  40    < > = values in this interval not displayed.     CBC  Recent Labs   Lab 11/26/22  1121 11/26/22  0403 11/25/22  1947 11/25/22  1150   WBC 17.7* 20.3* 21.8* 14.1*   RBC 2.82* 3.11* 3.16* 2.84*   HGB 9.5* 10.5* 10.6* 9.5*   HCT 31.0* 33.0* 34.0* 30.3*   * 106* 108* 107*   MCH 33.7* 33.8* 33.5* 33.5*   MCHC 30.6* 31.8 31.2* 31.4*   RDW 18.8* 18.5* 18.7* 18.7*   * 129* 192 110*     INRNo lab results found in last 7 days.  Arterial Blood Gas  Recent Labs   Lab 11/26/22  1121 11/25/22  1947 11/23/22  1107 11/23/22  0442 11/22/22  1839 11/22/22  1545   PH  --   --  7.34* 7.47* 7.47* 7.49*   PCO2  --   --  43 32* 33* 32*   PO2  --   --  99 100 104 136*   HCO3  --   --  23 23 24 24   O2PER 60 45 30 40 40 40       All cultures:  No results for input(s): CULT in the last 168 hours.  Recent Results (from the past 24 hour(s))   XR Chest Port 1 View    Narrative    EXAM: XR CHEST PORT 1 VIEW  LOCATION: [facility]  DATE/TIME: 11/25/2022 10:05 PM    INDICATION: Hypoxemia.  COMPARISON: 11/21/2022      Impression    IMPRESSION: There is complete opacification of the left lung presumed to represent a large left pleural effusion. There  is a new right PICC with distal tip at the SVC/atrial junction. Prior seen ETT and NG tubes have been removed. Chest otherwise stable.   There is a short segment of catheter with side ports projected over the upper/midportion of the right hemithorax. Mild right pleural effusion with underlying infiltrate/atelectasis in the right lung base.   XR Chest Port 1 View    Narrative    EXAM: XR CHEST PORT 1 VIEW  LOCATION: [facility]  DATE/TIME: 11/26/2022 11:02 AM    INDICATION: s p left thora. Post procedure.  COMPARISON: 11/25/2022      Impression    IMPRESSION: There is a reduction in size of a left pleural effusion consistent with interval thoracentesis. There remains a residual effusion and/or consolidation in the left lower lung. Improved aeration of the left upper lung. A small right pleural   effusion and mild right basilar opacities are unchanged. Right chest tube, unchanged in position. Right IJ line tip in the mid SVC. Right PICC line tip in the low SVC. Heart size is within normal limits for technique. Normal pulmonary vascularity.       MD Jessica    Billing: This patient is critically ill: Yes. Total critical care time today 60 min.

## 2022-11-26 NOTE — PROGRESS NOTES
RT called to bedside to place pt on BiPAP per MD order. Pt was placed on BiPAP 12/6 70% with SpO2 in the mid 90's. There is some dried bloody drainage around the pt's nares. Alarm volume set at 10 on BiPAP.  Will cont to monitor.  11/26/2022  Kelly Kim, RT

## 2022-11-26 NOTE — ANESTHESIA PROCEDURE NOTES
Airway         Procedure Start/Stop Times: 11/26/2022 12:38 PM  Staff -        CRNA: Ashleigh Khan APRN CRNA       Performed By: CRNA  Consent for Airway        Urgency: elective  Indications and Patient Condition       Indications for airway management: respiratory insufficiency       Induction type:intravenous       Mask difficulty assessment: 1 - vent by mask    Final Airway Details       Final airway type: endotracheal airway       Successful airway: ETT - single  Endotracheal Airway Details        ETT size (mm): 7.5       Cuffed: yes       Successful intubation technique: video laryngoscopy       VL Blade Size: Glidescope 4       Grade View of Cords: 1       Adjucts: stylet       Position: Right       Measured from: gums/teeth       Secured at (cm): 23       Bite block used: None    Post intubation assessment        Placement verified by: capnometry, equal breath sounds and chest rise        Number of attempts at approach: 1       Number of other approaches attempted: 0       Secured with: commercial tube richards       Ease of procedure: easy       Dentition: Intact and Unchanged    Medication(s) Administered   Medication Administration Time: 11/26/2022 12:38 PM

## 2022-11-26 NOTE — PROGRESS NOTES
Patient was intubated at around 1340 today, RT was bedside for the intubation. Intubated with 7.5 ETT cuffed. 23@Lips    Shortly after intubation, RT was called to bedside because patient was desatting significantly. SpO2 was ~74% and dropping when I arrived and was still dropping slowly. I suctioned the patient multiple times via in-line ETT suction and was able to pull copious amounts of thick secretions out, at which time the patients SpO2 had quickly begun to rise again. SpO2 reached % within minutes of suctioning.     RT will continue aggressive pulmonary hygiene  Via MM nebs and Volara treatments.     Guido Mclain, RT

## 2022-11-26 NOTE — PLAN OF CARE
[facility]  Yrn-synephrine, diltiazem    Events of shift:  CXR, suspect pleural effusion, US completed by provider  Atrial fibrillation w/ RVR, unresponsive to Cardizem, discussed with provider possibility of intravascular dehydration  Also discussed BP cuff pressure unreliability d/t afib  Converted to NSR 0600, PVCs and suspect ST elevation, EKG shows PVCs  Pt refused bath in favor of sleep, slept through night  Prismaflex filter change x1 at approx. 0530      Problem: Plan of Care - These are the overarching goals to be used throughout the patient stay.    Goal: Optimal Comfort and Wellbeing  Outcome: Progressing  Intervention: Provide Person-Centered Care  Recent Flowsheet Documentation  Taken 11/26/2022 0400 by Jana Chu RN  Trust Relationship/Rapport:    care explained    choices provided    reassurance provided  Taken 11/26/2022 0000 by Jana Chu, RN  Trust Relationship/Rapport:    care explained    choices provided    reassurance provided  Taken 11/25/2022 2000 by Jana Chu RN  Trust Relationship/Rapport:    care explained    choices provided    reassurance provided     Problem: Pneumonia  Goal: Fluid Balance  Outcome: Progressing     Problem: Plan of Care - These are the overarching goals to be used throughout the patient stay.    Goal: Absence of Hospital-Acquired Illness or Injury  Intervention: Identify and Manage Fall Risk  Recent Flowsheet Documentation  Taken 11/26/2022 0400 by Jana Chu, RN  Safety Promotion/Fall Prevention:    bed alarm on    fall prevention program maintained    clutter free environment maintained    increase visualization of patient    lighting adjusted    patient and family education    room organization consistent  Taken 11/26/2022 0000 by Jana Chu RN  Safety Promotion/Fall Prevention:    bed alarm on    fall prevention program maintained    clutter free environment maintained    increase visualization of patient    lighting adjusted     patient and family education    room organization consistent  Taken 11/25/2022 2000 by Jana Chu RN  Safety Promotion/Fall Prevention:    bed alarm on    fall prevention program maintained    clutter free environment maintained    increase visualization of patient    lighting adjusted    patient and family education    room organization consistent  Intervention: Prevent Skin Injury  Recent Flowsheet Documentation  Taken 11/26/2022 0600 by Jana Chu RN  Body Position:    turned    supine, head elevated    heels elevated    upper extremity elevated  Taken 11/26/2022 0400 by Jana Chu RN  Body Position:    turned    supine, head elevated    heels elevated    upper extremity elevated  Taken 11/26/2022 0200 by Jana Chu RN  Body Position:    turned    supine, head elevated    heels elevated    upper extremity elevated  Taken 11/26/2022 0000 by Jana Chu RN  Body Position:    turned    supine, head elevated    heels elevated    upper extremity elevated  Taken 11/25/2022 2200 by Jana Chu RN  Body Position:    turned    supine, head elevated    heels elevated    upper extremity elevated  Taken 11/25/2022 2000 by Jana Chu RN  Body Position:    turned    supine, head elevated    heels elevated    upper extremity elevated  Intervention: Prevent and Manage VTE (Venous Thromboembolism) Risk  Recent Flowsheet Documentation  Taken 11/26/2022 0400 by Jana Chu RN  VTE Prevention/Management: SCDs (sequential compression devices) on  Taken 11/26/2022 0000 by Jana Chu RN  VTE Prevention/Management: SCDs (sequential compression devices) on  Taken 11/25/2022 2000 by Jana Chu RN  VTE Prevention/Management: SCDs (sequential compression devices) on  Intervention: Prevent Infection  Recent Flowsheet Documentation  Taken 11/26/2022 0400 by Jana Chu RN  Infection Prevention:    environmental surveillance performed    equipment surfaces disinfected     hand hygiene promoted    personal protective equipment utilized    rest/sleep promoted    single patient room provided    visitors restricted/screened  Taken 11/26/2022 0000 by Jana Chu RN  Infection Prevention:    environmental surveillance performed    equipment surfaces disinfected    hand hygiene promoted    personal protective equipment utilized    rest/sleep promoted    single patient room provided    visitors restricted/screened  Taken 11/25/2022 2000 by Jana Chu RN  Infection Prevention:    environmental surveillance performed    equipment surfaces disinfected    hand hygiene promoted    personal protective equipment utilized    rest/sleep promoted    single patient room provided    visitors restricted/screened     Problem: Risk for Delirium  Goal: Improved Behavioral Control  Intervention: Minimize Safety Risk  Recent Flowsheet Documentation  Taken 11/26/2022 0400 by Jana Chu RN  Enhanced Safety Measures: bed alarm set  Trust Relationship/Rapport:    care explained    choices provided    reassurance provided  Taken 11/26/2022 0000 by Jana Chu RN  Enhanced Safety Measures: bed alarm set  Trust Relationship/Rapport:    care explained    choices provided    reassurance provided  Taken 11/25/2022 2000 by Jana Chu RN  Enhanced Safety Measures: bed alarm set  Trust Relationship/Rapport:    care explained    choices provided    reassurance provided     Problem: Restraint, Nonviolent  Goal: Absence of Harm or Injury  Intervention: Protect Dignity, Rights and Personal Wellbeing  Recent Flowsheet Documentation  Taken 11/26/2022 0400 by Jana Chu RN  Trust Relationship/Rapport:    care explained    choices provided    reassurance provided  Taken 11/26/2022 0000 by Jana Chu RN  Trust Relationship/Rapport:    care explained    choices provided    reassurance provided  Taken 11/25/2022 2000 by Jana Chu RN  Trust Relationship/Rapport:    care  explained    choices provided    reassurance provided  Intervention: Protect Skin and Joint Integrity  Recent Flowsheet Documentation  Taken 11/26/2022 0600 by Jana Chu RN  Body Position:    turned    supine, head elevated    heels elevated    upper extremity elevated  Taken 11/26/2022 0400 by Jana Chu RN  Body Position:    turned    supine, head elevated    heels elevated    upper extremity elevated  Taken 11/26/2022 0200 by Jana Chu RN  Body Position:    turned    supine, head elevated    heels elevated    upper extremity elevated  Taken 11/26/2022 0000 by Jana Chu RN  Body Position:    turned    supine, head elevated    heels elevated    upper extremity elevated  Taken 11/25/2022 2200 by Jana Chu RN  Body Position:    turned    supine, head elevated    heels elevated    upper extremity elevated  Taken 11/25/2022 2000 by Jana Chu RN  Body Position:    turned    supine, head elevated    heels elevated    upper extremity elevated     Problem: Pneumonia  Goal: Resolution of Infection Signs and Symptoms  Intervention: Prevent Infection Progression  Recent Flowsheet Documentation  Taken 11/26/2022 0400 by Jana Chu RN  Isolation Precautions:    droplet precautions maintained    contact precautions maintained  Taken 11/26/2022 0000 by Jana Chu RN  Isolation Precautions:    droplet precautions maintained    contact precautions maintained  Taken 11/25/2022 2000 by Jana Chu RN  Isolation Precautions:    droplet precautions maintained    contact precautions maintained  Goal: Effective Oxygenation and Ventilation  Intervention: Promote Airway Secretion Clearance  Recent Flowsheet Documentation  Taken 11/26/2022 0400 by Jana Chu RN  Cough And Deep Breathing: done with encouragement  Taken 11/26/2022 0000 by Jana Chu RN  Cough And Deep Breathing: done with encouragement  Taken 11/25/2022 2000 by Jana Chu RN  Cough And  Deep Breathing: done with encouragement  Intervention: Optimize Oxygenation and Ventilation  Recent Flowsheet Documentation  Taken 11/26/2022 0600 by Jana Chu RN  Head of Bed (HOB) Positioning: HOB at 30-45 degrees  Taken 11/26/2022 0400 by Jana Chu RN  Head of Bed (HOB) Positioning: HOB at 30-45 degrees  Taken 11/26/2022 0200 by Jana Chu RN  Head of Bed (HOB) Positioning: HOB at 30-45 degrees  Taken 11/26/2022 0000 by Jana Chu RN  Head of Bed (HOB) Positioning: HOB at 30-45 degrees  Taken 11/25/2022 2200 by Jana Chu RN  Head of Bed (HOB) Positioning: HOB at 30-45 degrees  Taken 11/25/2022 2000 by Jana Chu RN  Head of Bed (HOB) Positioning: HOB at 30-45 degrees     Problem: CRRT (Continuous Renal Replacement Therapy)  Goal: Absence of Infection Signs and Symptoms  Intervention: Prevent or Manage Infection  Recent Flowsheet Documentation  Taken 11/26/2022 0400 by Jana Chu RN  Infection Prevention:    environmental surveillance performed    equipment surfaces disinfected    hand hygiene promoted    personal protective equipment utilized    rest/sleep promoted    single patient room provided    visitors restricted/screened  Isolation Precautions:    droplet precautions maintained    contact precautions maintained  Taken 11/26/2022 0000 by Jana Chu RN  Infection Prevention:    environmental surveillance performed    equipment surfaces disinfected    hand hygiene promoted    personal protective equipment utilized    rest/sleep promoted    single patient room provided    visitors restricted/screened  Isolation Precautions:    droplet precautions maintained    contact precautions maintained  Taken 11/25/2022 2000 by Jana Chu RN  Infection Prevention:    environmental surveillance performed    equipment surfaces disinfected    hand hygiene promoted    personal protective equipment utilized    rest/sleep promoted    single patient room  provided    visitors restricted/screened  Isolation Precautions:    droplet precautions maintained    contact precautions maintained   Goal Outcome Evaluation:

## 2022-11-26 NOTE — PROCEDURES
[facility]    Cath, vein umbilical     Date/Time: 2022 11:06 AM  Performed by: Dominik Turner MD  Authorized by: Dominik Turner MD       UNIVERSAL PROTOCOL   Site Marked: Yes  Prior Images Obtained and Reviewed:  Yes  Required items: Required blood products, implants, devices and special equipment available    Patient identity confirmed:  Verbally with patient  Patient was reevaluated immediately before administering moderate or deep sedation or anesthesia  Confirmation Checklist:  Patient's identity using two indicators, relevant allergies, procedure was appropriate and matched the consent or emergent situation and correct equipment/implants were available  Time out: Immediately prior to the procedure a time out was called    Universal Protocol: the Joint Commission Universal Protocol was followed    Preparation: Patient was prepped and draped in usual sterile fashion      Procedure purpose: therapeutic  Indications: pleural effusion       ANESTHESIA    Local Anesthetic: Lidocaine 1% without epinephrine      SEDATION    Patient Sedated: No      PROCEDURE DETAILS   Preparation: skin prepped with ChloraPrep  Patient position: supine  Ultrasound guidance: yes  Location: left lateral  Intercostal space: 5th  Puncture method: over-the-needle catheter  Needle size: 18  Catheter size: 18 gauge  Number of attempts: 1  Drainage characteristics: serosanguinous      PROCEDURE  Describe Procedure: 1.5 lt removed. Done for plerual effusion  Patient Tolerance:  Patient tolerated the procedure well with no immediate complications  Length of time physician/provider present for 1:1 monitoring during sedation: 0

## 2022-11-26 NOTE — PROGRESS NOTES
[facility]    Infectious Disease Progress Note    Date of Service : 11/26/2022     Assessment:  57 YM with hx of liver transplantation who is hopsitalzied with out of hospital PEA cardiac arrest with 20 min CPR and found to have pneumococcal pneumonia with ARDS and sepsis superimposed on  Parainfluenza pneumonia. He was in septic shock with multiorgan failure with acute hypoxic respiratory failure requiring mechanical ventilation, LORETTA requiring CRRT  Course was  complicated by barotrauma with right pneumothorax requiring chest tube . 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 On Tacrolimus  -LORETTA on CRRT  -Thrombocytopenia related to sepsis improving  -anemia    Recommendations  1. Completing 2 week treatment course for pneumococcal pneumonia with bacteremia on 11/27.   2. Trach/ PEG planned  3. Can discontinue antibiotics after trach  Supportive care    Discussed with the critical care team  Tori Hernández MD    Interval History   Failed extubation. Re intubated. Had copious pooled secretions , also had thoracentesis . Remains afebrile with improving wBC    Physical Exam   Temp: 98.1  F (36.7  C) Temp src: Temporal BP: 93/51 Pulse: 59   Resp: 16 SpO2: 99 % O2 Device: BiPAP/CPAP Oxygen Delivery: 40 LPM  Vitals:    11/22/22 0200 11/23/22 0200 11/24/22 0400   Weight: 99.6 kg (219 lb 9.3 oz) 97.2 kg (214 lb 4.6 oz) 99.1 kg (218 lb 7.6 oz)     Vital Signs with  Ranges  Temp:  [96.2  F (35.7  C)-98.1  F (36.7  C)] 98.1  F (36.7  C)  Pulse:  [] 59  Resp:  [0-32] 16  BP: ()/() 93/51  FiO2 (%):  [40 %-70 %] 70 %  SpO2:  [73 %-100 %] 99 %    Constitutional: intubated  CVS : S1S2 on monitor  Skin : no rash, herpes labialis lesions crusting  Neuro : cannot assess    Other:    Medications     dexmedetomidine Stopped (11/25/22 1827)     dextrose       CRRT replacement solution       fentaNYL Stopped (11/23/22 1616)     - MEDICATION INSTRUCTIONS -       - MEDICATION INSTRUCTIONS -       - MEDICATION INSTRUCTIONS -       norepinephrine       phenylephrine 1 mcg/kg/min (11/26/22 1348)     CRRT replacement solution 200 mL/hr at 11/26/22 0749     CRRT replacement solution       propofol 5 mcg/kg/min (11/26/22 1404)     sodium chloride 10 mL/hr at 11/26/22 0757     sodium chloride 0 mL/hr at 11/22/22 0742     vasopressin         acetylcysteine  2 mL Nebulization 4x Daily     acyclovir   Topical Q8H     artificial tears  1 drop Both Eyes TID     cefTRIAXone  2 g Intravenous Q24H     chlorhexidine  15 mL Mouth/Throat Q12H     heparin ANTICOAGULANT  5,000 Units Subcutaneous Q8H     heparin lock flush  5-20 mL Intracatheter Q24H     hydrocortisone sodium succinate PF  50 mg Intravenous Q6H     insulin aspart  1-12 Units Subcutaneous Q4H     insulin glargine  10 Units Subcutaneous QAM AC     ipratropium - albuterol 0.5 mg/2.5 mg/3 mL  3 mL Nebulization 4x daily     multivitamins w/minerals  15 mL Per Feeding Tube Daily     pantoprazole  40 mg Per Feeding Tube QAM AC    Or     pantoprazole  40 mg Intravenous QAM AC     polyethylene glycol  17 g Oral Daily     senna-docusate  1 tablet Oral BID    Or     senna-docusate  2 tablet Oral BID     sodium chloride (PF)  10-40 mL Intracatheter Q8H     sodium chloride (PF)  10-40 mL Intracatheter Q8H     sodium chloride (PF) 0.9%  10 mL Intracatheter Once in dialysis/CRRT     sodium chloride (PF) 0.9%  10 mL Intracatheter Once in  dialysis/CRRT       Data   All microbiology laboratory data reviewed.  Recent Labs   Lab Test 11/26/22  1121 11/26/22  0403 11/25/22 1947   WBC 17.7* 20.3* 21.8*   HGB 9.5* 10.5* 10.6*   HCT 31.0* 33.0* 34.0*   * 106* 108*   * 129* 192     Recent Labs   Lab Test 11/26/22  1121 11/26/22  0403 11/25/22 1947   CR 1.29* 1.28* 1.43*

## 2022-11-27 ENCOUNTER — APPOINTMENT (OUTPATIENT)
Dept: GENERAL RADIOLOGY | Facility: CLINIC | Age: 57
DRG: 004 | End: 2022-11-27
Attending: INTERNAL MEDICINE
Payer: COMMERCIAL

## 2022-11-27 LAB
ALBUMIN SERPL-MCNC: 2 G/DL (ref 3.4–5)
ALP SERPL-CCNC: 177 U/L (ref 40–150)
ALT SERPL W P-5'-P-CCNC: 29 U/L (ref 0–70)
ANION GAP SERPL CALCULATED.3IONS-SCNC: 8 MMOL/L (ref 3–14)
AST SERPL W P-5'-P-CCNC: 33 U/L (ref 0–45)
BILIRUB DIRECT SERPL-MCNC: 0.8 MG/DL (ref 0–0.2)
BILIRUB SERPL-MCNC: 1.1 MG/DL (ref 0.2–1.3)
BUN SERPL-MCNC: 56 MG/DL (ref 7–30)
CA-I BLD-MCNC: 4.3 MG/DL (ref 4.4–5.2)
CALCIUM SERPL-MCNC: 7.7 MG/DL (ref 8.5–10.1)
CHLORIDE BLD-SCNC: 107 MMOL/L (ref 94–109)
CO2 SERPL-SCNC: 24 MMOL/L (ref 20–32)
CREAT SERPL-MCNC: 2.02 MG/DL (ref 0.66–1.25)
ERYTHROCYTE [DISTWIDTH] IN BLOOD BY AUTOMATED COUNT: 18.9 % (ref 10–15)
GFR SERPL CREATININE-BSD FRML MDRD: 38 ML/MIN/1.73M2
GLUCOSE BLD-MCNC: 175 MG/DL (ref 70–99)
GLUCOSE BLDC GLUCOMTR-MCNC: 110 MG/DL (ref 70–99)
GLUCOSE BLDC GLUCOMTR-MCNC: 197 MG/DL (ref 70–99)
GLUCOSE BLDC GLUCOMTR-MCNC: 197 MG/DL (ref 70–99)
GLUCOSE BLDC GLUCOMTR-MCNC: 240 MG/DL (ref 70–99)
GLUCOSE BLDC GLUCOMTR-MCNC: 248 MG/DL (ref 70–99)
HCT VFR BLD AUTO: 26.5 % (ref 40–53)
HGB BLD-MCNC: 8.3 G/DL (ref 13.3–17.7)
MAGNESIUM SERPL-MCNC: 2.1 MG/DL (ref 1.6–2.3)
MCH RBC QN AUTO: 33.5 PG (ref 26.5–33)
MCHC RBC AUTO-ENTMCNC: 31.3 G/DL (ref 31.5–36.5)
MCV RBC AUTO: 107 FL (ref 78–100)
PHOSPHATE SERPL-MCNC: 4.3 MG/DL (ref 2.5–4.5)
PLATELET # BLD AUTO: 99 10E3/UL (ref 150–450)
POTASSIUM BLD-SCNC: 4.1 MMOL/L (ref 3.4–5.3)
PROT SERPL-MCNC: 5 G/DL (ref 6.8–8.8)
RBC # BLD AUTO: 2.48 10E6/UL (ref 4.4–5.9)
SODIUM SERPL-SCNC: 139 MMOL/L (ref 133–144)
WBC # BLD AUTO: 15.2 10E3/UL (ref 4–11)

## 2022-11-27 PROCEDURE — 83735 ASSAY OF MAGNESIUM: CPT | Performed by: INTERNAL MEDICINE

## 2022-11-27 PROCEDURE — 82247 BILIRUBIN TOTAL: CPT | Performed by: INTERNAL MEDICINE

## 2022-11-27 PROCEDURE — 94799 UNLISTED PULMONARY SVC/PX: CPT

## 2022-11-27 PROCEDURE — 250N000013 HC RX MED GY IP 250 OP 250 PS 637: Performed by: STUDENT IN AN ORGANIZED HEALTH CARE EDUCATION/TRAINING PROGRAM

## 2022-11-27 PROCEDURE — 82248 BILIRUBIN DIRECT: CPT | Performed by: INTERNAL MEDICINE

## 2022-11-27 PROCEDURE — 250N000009 HC RX 250: Performed by: INTERNAL MEDICINE

## 2022-11-27 PROCEDURE — 200N000001 HC R&B ICU

## 2022-11-27 PROCEDURE — 80053 COMPREHEN METABOLIC PANEL: CPT | Performed by: INTERNAL MEDICINE

## 2022-11-27 PROCEDURE — 71045 X-RAY EXAM CHEST 1 VIEW: CPT

## 2022-11-27 PROCEDURE — 250N000013 HC RX MED GY IP 250 OP 250 PS 637: Performed by: INTERNAL MEDICINE

## 2022-11-27 PROCEDURE — 250N000011 HC RX IP 250 OP 636: Performed by: SURGERY

## 2022-11-27 PROCEDURE — 250N000011 HC RX IP 250 OP 636: Performed by: INTERNAL MEDICINE

## 2022-11-27 PROCEDURE — 250N000011 HC RX IP 250 OP 636: Performed by: SPECIALIST

## 2022-11-27 PROCEDURE — 99222 1ST HOSP IP/OBS MODERATE 55: CPT | Performed by: SURGERY

## 2022-11-27 PROCEDURE — 99291 CRITICAL CARE FIRST HOUR: CPT | Performed by: INTERNAL MEDICINE

## 2022-11-27 PROCEDURE — 94003 VENT MGMT INPAT SUBQ DAY: CPT

## 2022-11-27 PROCEDURE — 82330 ASSAY OF CALCIUM: CPT | Performed by: INTERNAL MEDICINE

## 2022-11-27 PROCEDURE — 94640 AIRWAY INHALATION TREATMENT: CPT | Mod: 76

## 2022-11-27 PROCEDURE — 999N000157 HC STATISTIC RCP TIME EA 10 MIN

## 2022-11-27 PROCEDURE — 99233 SBSQ HOSP IP/OBS HIGH 50: CPT | Performed by: INTERNAL MEDICINE

## 2022-11-27 PROCEDURE — 74018 RADEX ABDOMEN 1 VIEW: CPT

## 2022-11-27 PROCEDURE — 94640 AIRWAY INHALATION TREATMENT: CPT

## 2022-11-27 PROCEDURE — 85014 HEMATOCRIT: CPT | Performed by: INTERNAL MEDICINE

## 2022-11-27 PROCEDURE — 94660 CPAP INITIATION&MGMT: CPT

## 2022-11-27 RX ORDER — CEFTRIAXONE 2 G/1
2 INJECTION, POWDER, FOR SOLUTION INTRAMUSCULAR; INTRAVENOUS EVERY 24 HOURS
Status: DISCONTINUED | OUTPATIENT
Start: 2022-11-28 | End: 2022-11-28

## 2022-11-27 RX ADMIN — ACETYLCYSTEINE 2 ML: 200 SOLUTION ORAL; RESPIRATORY (INHALATION) at 11:49

## 2022-11-27 RX ADMIN — ACYCLOVIR: 50 OINTMENT TOPICAL at 13:59

## 2022-11-27 RX ADMIN — HEPARIN SODIUM 5000 UNITS: 5000 INJECTION, SOLUTION INTRAVENOUS; SUBCUTANEOUS at 05:35

## 2022-11-27 RX ADMIN — ACETYLCYSTEINE 2 ML: 200 SOLUTION ORAL; RESPIRATORY (INHALATION) at 07:15

## 2022-11-27 RX ADMIN — HYDROCORTISONE SODIUM SUCCINATE 50 MG: 100 INJECTION, POWDER, FOR SOLUTION INTRAMUSCULAR; INTRAVENOUS at 00:16

## 2022-11-27 RX ADMIN — Medication 1 DROP: at 21:15

## 2022-11-27 RX ADMIN — INSULIN ASPART 2 UNITS: 100 INJECTION, SOLUTION INTRAVENOUS; SUBCUTANEOUS at 05:36

## 2022-11-27 RX ADMIN — HYDROCORTISONE SODIUM SUCCINATE 50 MG: 100 INJECTION, POWDER, FOR SOLUTION INTRAMUSCULAR; INTRAVENOUS at 05:35

## 2022-11-27 RX ADMIN — Medication 40 MG: at 09:13

## 2022-11-27 RX ADMIN — INSULIN ASPART 5 UNITS: 100 INJECTION, SOLUTION INTRAVENOUS; SUBCUTANEOUS at 20:08

## 2022-11-27 RX ADMIN — HYDROCORTISONE SODIUM SUCCINATE 50 MG: 100 INJECTION, POWDER, FOR SOLUTION INTRAMUSCULAR; INTRAVENOUS at 12:22

## 2022-11-27 RX ADMIN — IPRATROPIUM BROMIDE AND ALBUTEROL SULFATE 3 ML: .5; 3 SOLUTION RESPIRATORY (INHALATION) at 07:14

## 2022-11-27 RX ADMIN — INSULIN ASPART 3 UNITS: 100 INJECTION, SOLUTION INTRAVENOUS; SUBCUTANEOUS at 09:22

## 2022-11-27 RX ADMIN — CEFTRIAXONE SODIUM 2 G: 2 INJECTION, POWDER, FOR SOLUTION INTRAMUSCULAR; INTRAVENOUS at 04:51

## 2022-11-27 RX ADMIN — INSULIN ASPART 3 UNITS: 100 INJECTION, SOLUTION INTRAVENOUS; SUBCUTANEOUS at 12:16

## 2022-11-27 RX ADMIN — IPRATROPIUM BROMIDE AND ALBUTEROL SULFATE 3 ML: .5; 3 SOLUTION RESPIRATORY (INHALATION) at 11:49

## 2022-11-27 RX ADMIN — HEPARIN SODIUM 5000 UNITS: 5000 INJECTION, SOLUTION INTRAVENOUS; SUBCUTANEOUS at 21:15

## 2022-11-27 RX ADMIN — Medication 1 DROP: at 09:13

## 2022-11-27 RX ADMIN — ACYCLOVIR: 50 OINTMENT TOPICAL at 21:15

## 2022-11-27 RX ADMIN — CHLORHEXIDINE GLUCONATE 0.12% ORAL RINSE 15 ML: 1.2 LIQUID ORAL at 21:14

## 2022-11-27 RX ADMIN — Medication 15 ML: at 09:13

## 2022-11-27 RX ADMIN — HEPARIN SODIUM 5000 UNITS: 5000 INJECTION, SOLUTION INTRAVENOUS; SUBCUTANEOUS at 13:57

## 2022-11-27 RX ADMIN — HYDROCORTISONE SODIUM SUCCINATE 50 MG: 100 INJECTION, POWDER, FOR SOLUTION INTRAMUSCULAR; INTRAVENOUS at 18:46

## 2022-11-27 RX ADMIN — IPRATROPIUM BROMIDE AND ALBUTEROL SULFATE 3 ML: .5; 3 SOLUTION RESPIRATORY (INHALATION) at 18:51

## 2022-11-27 RX ADMIN — ACETYLCYSTEINE 2 ML: 200 SOLUTION ORAL; RESPIRATORY (INHALATION) at 16:19

## 2022-11-27 RX ADMIN — ACYCLOVIR: 50 OINTMENT TOPICAL at 05:36

## 2022-11-27 RX ADMIN — CHLORHEXIDINE GLUCONATE 0.12% ORAL RINSE 15 ML: 1.2 LIQUID ORAL at 09:13

## 2022-11-27 RX ADMIN — IPRATROPIUM BROMIDE AND ALBUTEROL SULFATE 3 ML: .5; 3 SOLUTION RESPIRATORY (INHALATION) at 16:19

## 2022-11-27 RX ADMIN — PROPOFOL 5 MCG/KG/MIN: 10 INJECTION, EMULSION INTRAVENOUS at 04:52

## 2022-11-27 RX ADMIN — ACETYLCYSTEINE 2 ML: 200 SOLUTION ORAL; RESPIRATORY (INHALATION) at 18:51

## 2022-11-27 RX ADMIN — INSULIN GLARGINE 10 UNITS: 100 INJECTION, SOLUTION SUBCUTANEOUS at 09:22

## 2022-11-27 RX ADMIN — INSULIN ASPART 5 UNITS: 100 INJECTION, SOLUTION INTRAVENOUS; SUBCUTANEOUS at 15:56

## 2022-11-27 ASSESSMENT — ACTIVITIES OF DAILY LIVING (ADL)
ADLS_ACUITY_SCORE: 49
ADLS_ACUITY_SCORE: 47
ADLS_ACUITY_SCORE: 49
ADLS_ACUITY_SCORE: 47
ADLS_ACUITY_SCORE: 51

## 2022-11-27 NOTE — PROGRESS NOTES
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 Acute respiratory failure - he is down to 30% and +8 PEEP and fully awake and alert on vent, and comfortable. He has difficulty ventilating which is likely a combination of acute on chronic illness, renal failure and volume overload, ALI, and possibly occult pneumonia. I had a long talk with patient, wife, and sister at bedside about advantages of trach and option to wait; they have decided to go forward with trach.   2. Pneumonia - strep and parainfluenza; on Rocephin and will complete 15 day course of therapy tomorrow; will check procalcitonin in AM  3. ID- voriconazole off now; complete 15 days of Rocephin  4. Shock - he remains on phenylephrine but down to 0.5  4. Acute renal failure -  HD now as tolerated.    5. S/p initial arrest and prolonged resuscitation (11.9) and hypothermia resuscitation   6. History of Liver Transplant 2016 - off tacrolimus but on steroids; Discussed with Liver Tx service and appreciate input- maintain him on at least the equivalent of prednisone 10 mgms/day. We will likely resume tacrolimus soon.   7. History of HTN  8. CHRISTIAN on BIPAP  9. Nutrition - enteral resumed   10. CNS- though awake and following commands, long term CNS prognosis is guarded. MRI  11/17 looks \"worse than what we have seen from him thus far\"           " Summary/Hospital Course:           Assessment and plan :     Blanco Osborne IS a 57 year old male admitted on 11/12/2022 for acute respiratory failure.   I have personally reviewed the daily labs, imaging studies, cultures and discussed the case with referring physician and consulting physicians.     My assessment and plan by system for this patient is as follows:    Neurology/Psychiatry:   1. Awake and alert on vent; following commands and moving all extremities     Cardiovascular:   1.Hemodynamics - compensated    Pulmonary/Ventilator Management:   1. On 30%; +8 PEEP; trach this week; continue to titrate down support as able     GI and Nutrition :   1. Enteral nutrition     Renal/Fluids/Electrolytes:   1. On HD    Infectious Disease:   1. Will extent Rocephin until tomorrow and check procalcitonin tomorrow     Endocrine:   1. Glucoses ok on coverage     Hematology/Oncology:   1. Hb ok 8.3     IV/Access:   1. Venous access -   2. Arterial access -   3.  Plan  - central access required and necessary      ICU Prophylaxis:   1. DVT: Hep Subq/ LMWH/mechanical  2. VAP: HOB 30 degrees, chlorhexidine rinse  3. Stress Ulcer: PPI/H2 blocker  4. Restraints: Nonviolent soft two point restraints required and necessary for patient safety and continued cares and good effect as patient continues to pull at necessary lines, tubes despite education and distraction. Will readdress daily.   5. IV Access - central access required and necessary for continued patient cares  6. Feeding - enteral   7. Family Update: discussed with family at bedside   8. Disposition - ICU care         Key goals for next 24 hours:   1. Supportive care today  2. Resume therapies   3.               Interim History:              Key Medications:       acetylcysteine  2 mL Nebulization 4x Daily     acyclovir   Topical Q8H     artificial tears  1 drop Both Eyes TID     cefTRIAXone  2 g Intravenous Q24H     chlorhexidine  15 mL Mouth/Throat Q12H     heparin  ANTICOAGULANT  5,000 Units Subcutaneous Q8H     heparin lock flush  5-20 mL Intracatheter Q24H     hydrocortisone sodium succinate PF  50 mg Intravenous Q6H     insulin aspart  1-12 Units Subcutaneous Q4H     insulin glargine  10 Units Subcutaneous QAM AC     ipratropium - albuterol 0.5 mg/2.5 mg/3 mL  3 mL Nebulization 4x daily     multivitamins w/minerals  15 mL Per Feeding Tube Daily     pantoprazole  40 mg Per Feeding Tube QAM AC    Or     pantoprazole  40 mg Intravenous QAM AC     polyethylene glycol  17 g Oral Daily     senna-docusate  1 tablet Oral BID    Or     senna-docusate  2 tablet Oral BID     sodium chloride (PF)  10-40 mL Intracatheter Q8H     sodium chloride (PF)  10-40 mL Intracatheter Q8H     sodium chloride (PF) 0.9%  10 mL Intracatheter Once in dialysis/CRRT     sodium chloride (PF) 0.9%  10 mL Intracatheter Once in dialysis/CRRT       dexmedetomidine Stopped (11/25/22 1827)     dextrose       CRRT replacement solution       fentaNYL Stopped (11/23/22 1616)     - MEDICATION INSTRUCTIONS -       - MEDICATION INSTRUCTIONS -       - MEDICATION INSTRUCTIONS -       norepinephrine       phenylephrine 0.5 mcg/kg/min (11/27/22 1015)     CRRT replacement solution 200 mL/hr at 11/26/22 0749     CRRT replacement solution       propofol Stopped (11/27/22 0800)     sodium chloride 10 mL/hr at 11/27/22 0800     sodium chloride 0 mL/hr at 11/22/22 0742     vasopressin                 Physical Examination:   Temp:  [98  F (36.7  C)-100.5  F (38.1  C)] 98.2  F (36.8  C)  Pulse:  [] 84  Resp:  [0-26] 21  BP: ()/(44-78) 105/67  FiO2 (%):  [30 %-70 %] 30 %  SpO2:  [85 %-100 %] 98 %    Intake/Output Summary (Last 24 hours) at 11/27/2022 1159  Last data filed at 11/27/2022 1000  Gross per 24 hour   Intake 1557.8 ml   Output 80 ml   Net 1477.8 ml     Wt Readings from Last 4 Encounters:   11/27/22 91 kg (200 lb 9.9 oz)   10/07/19 137.5 kg (303 lb 3.2 oz)   10/04/19 131.5 kg (290 lb)   02/20/19 140.4 kg (309  lb 9.6 oz)     BP - Mean:  [51-95] 76  Vent Mode: CMV/AC  (Continuous Mandatory Ventilation/ Assist Control)  FiO2 (%): 30 %  Resp Rate (Set): 16 breaths/min  Tidal Volume (Set, mL): 450 mL  PEEP (cm H2O): 8 cmH2O  Resp: 21    Recent Labs   Lab 11/26/22  1343 11/26/22  1121 11/25/22  1947 11/23/22  1107 11/23/22  0442 11/22/22  1839 11/22/22  1545   PH  --   --   --  7.34* 7.47* 7.47* 7.49*   PCO2  --   --   --  43 32* 33* 32*   PO2  --   --   --  99 100 104 136*   HCO3  --   --   --  23 23 24 24   O2PER 70 60 45 30 40 40 40       GEN: no acute distress   HEENT: head ncat, sclera anicteric, OP patent, trachea midline   PULM: unlabored synchronous with vent, clear anteriorly    CV/COR: RRR S1S2 no gallop,  No rub, no murmur  ABD: soft nontender,   EXT:  Mod edema   warm  NEURO: grossly intact  SKIN: no obvious rash; no cyanosis or mottling   LINES: clean, dry intact         Data:   All data and imaging reviewed     ROUTINE ICU LABS (Last four results)  CMP  Recent Labs   Lab 11/27/22  0921 11/27/22  0447 11/27/22  0020 11/26/22 2026 11/26/22  1524 11/26/22  1121 11/26/22  0411 11/26/22  0403 11/25/22  1953 11/25/22  1947 11/25/22  0812 11/25/22  0409 11/23/22  0442 11/23/22  0441   NA  --  139  --   --   --  137  --  138  --  136   < > 135   < > 136   POTASSIUM  --  4.1  --   --   --  4.2  --  4.3  --  4.7   < > 4.8   < > 4.8   CHLORIDE  --  107  --   --   --  106  --  105  --  104   < > 104   < > 106   CO2  --  24  --   --   --  23  --  27  --  24   < > 26   < > 23   ANIONGAP  --  8  --   --   --  8  --  6  --  8   < > 5   < > 7   * 175* 110* 88   < > 123*   < > 120*   < > 151*   < > 140*   < > 263*   BUN  --  56*  --   --   --  37*  --  40*  --  48*   < > 58*   < > 53*   CR  --  2.02*  --   --   --  1.29*  --  1.28*  --  1.43*   < > 1.84*   < > 1.51*   GFRESTIMATED  --  38*  --   --   --  64  --  65  --  57*   < > 42*   < > 53*   KELLY  --  7.7*  --   --   --  8.2*  --  8.7  --  9.1   < > 8.4*   < > 7.6*    MAG  --  2.1  --   --   --  1.9  --  1.9  --  2.1   < > 2.2   < >  --    PHOS  --  4.3  --   --   --  4.5  --  4.5  --  4.9*   < > 5.6*   < >  --    PROTTOTAL  --  5.0*  --   --   --  5.7*  --  6.0*  --   --   --  5.6*  --  5.1*   ALBUMIN  --  2.0*  --   --   --  2.3*  --  2.1*  --  2.1*   < > 1.9*   < > 1.8*   BILITOTAL  --  1.1  --   --   --   --   --  1.6*  --   --   --  1.5*  --  1.2   ALKPHOS  --  177*  --   --   --   --   --  210*  --   --   --  192*  --  201*   AST  --  33  --   --   --   --   --  39  --   --   --  34  --  26   ALT  --  29  --   --   --   --   --  37  --   --   --  34  --  31    < > = values in this interval not displayed.     CBC  Recent Labs   Lab 11/27/22  0447 11/26/22 1121 11/26/22  0403 11/25/22 1947   WBC 15.2* 17.7* 20.3* 21.8*   RBC 2.48* 2.82* 3.11* 3.16*   HGB 8.3* 9.5* 10.5* 10.6*   HCT 26.5* 31.0* 33.0* 34.0*   * 110* 106* 108*   MCH 33.5* 33.7* 33.8* 33.5*   MCHC 31.3* 30.6* 31.8 31.2*   RDW 18.9* 18.8* 18.5* 18.7*   PLT 99* 129* 129* 192     INRNo lab results found in last 7 days.  Arterial Blood Gas  Recent Labs   Lab 11/26/22  1343 11/26/22  1121 11/25/22 1947 11/23/22  1107 11/23/22 0442 11/22/22  1839 11/22/22  1545   PH  --   --   --  7.34* 7.47* 7.47* 7.49*   PCO2  --   --   --  43 32* 33* 32*   PO2  --   --   --  99 100 104 136*   HCO3  --   --   --  23 23 24 24   O2PER 70 60 45 30 40 40 40       All cultures:  No results for input(s): CULT in the last 168 hours.  Recent Results (from the past 24 hour(s))   XR Chest Port 1 View    Narrative    EXAM: XR CHEST PORT 1 VIEW  LOCATION: [facility]  DATE/TIME: 11/26/2022 12:57 PM    INDICATION: Endotracheal tube placement.  COMPARISON: Chest radiograph performed earlier today.      Impression    IMPRESSION: An endotracheal tube has been placed, with tip 3 cm above the og. And enteric tube has been placed, with tip not seen, but below the diaphragm. No other significant interval change.  Bilateral pleural effusions, moderate on the left and   small on the right, with associated infiltrate and/or atelectasis at both lung bases. Right IJ central venous catheter, right PICC line, and single right chest tube are unchanged in position. No pneumothorax.   XR Chest Port 1 View    Narrative    EXAM: XR CHEST PORT 1 VIEW  LOCATION: [facility]  DATE/TIME: 11/26/2022 1:59 PM    INDICATION: follow up hypoxemia  COMPARISON: 11/26/2022, 1252 hours      Impression    IMPRESSION: Endotracheal tube terminates 3.8 cm above the og. Right neck central venous catheter in the mid SVC and right upper extremity PICC at the SVC-RA junction. Enteric tube below the diaphragm, tip beyond the field-of-view. Unchanged right   chest tube.    Low lung volumes with layering bilateral pleural effusions. Slightly improved aeration of the left lung compared to prior.   XR Chest Port 1 View    Narrative    EXAM: XR CHEST PORT 1 VIEW  LOCATION: [facility]  DATE/TIME: 11/27/2022 5:47 AM    INDICATION: follow up atelectasis  COMPARISON: 11/26/2022      Impression    IMPRESSION: Tubes and lines are in stable position. No significant change in bilateral pleural effusions and bibasilar consolidations.       MD Jessica    Billing: This patient is critically ill: Yes. Total critical care time today 35 min.

## 2022-11-27 NOTE — PROGRESS NOTES
[facility] ICU RESPIRATORY NOTE        Date of Admission: 11/12/2022    Date of Intubation (most recent): 11/26/22    Reason for Mechanical Ventilation: Respiratory Failure    Number of Days on Mechanical Ventilation: 2    Met Criteria for Spontaneous Breathing Trial: No    Reason for No Spontaneous Breathing Trial: Per MD    Significant Events Today: None    ABG Results:   Recent Labs   Lab 11/26/22  1343 11/26/22  1121 11/25/22  1947 11/23/22  1107 11/23/22  0442 11/22/22  1839 11/22/22  1545   PH  --   --   --  7.34* 7.47* 7.47* 7.49*   PCO2  --   --   --  43 32* 33* 32*   PO2  --   --   --  99 100 104 136*   HCO3  --   --   --  23 23 24 24   O2PER 70 60 45 30 40 40 40       Current Vent Settings: Vent Mode: CMV/AC  (Continuous Mandatory Ventilation/ Assist Control)  FiO2 (%): 30 %  Resp Rate (Set): 16 breaths/min  Tidal Volume (Set, mL): 450 mL  PEEP (cm H2O): 8 cmH2O  Resp: 19      Skin Assessment: Bloody lip/nares.  Optifoam in place    Plan: Continue full vent support; reassess daily for SBT.  Plan for trach soon.    Erica Garcia, RT on 11/27/2022 at 5:48 PM

## 2022-11-27 NOTE — PROGRESS NOTES
Pt lethargic and disoriented to time and place, needing bipap 70%, vbgs done. Per family discussion agreed to intubation, repeat gas improved after, pt opening eye more frequently with low dose sedation, following commands. CRRT stopped, blood returned to pt, tolerated well. Albumin given intra thora. 1,500 ml drained with thora. XRs done. Low dose mayelin and propofol. Dilt gtt stopped. Tube feed restarted at goal 55 ml per Dr Jones. No Bm today. PEG/Trach consult placed today. Plan for HD. MD updated family by bedside multiple times, all questions answered. Will cont to closely monitor.

## 2022-11-27 NOTE — PROGRESS NOTES
[facility] ICU RESPIRATORY NOTE        Date of Admission: 11/12/2022    Date of Intubation (most recent): 11/26/2022    Reason for Mechanical Ventilation: Respiratory failure.    Number of Days on Mechanical Ventilation: 2    Met Criteria for Spontaneous Breathing Trial: No    Reason for No Spontaneous Breathing Trial: Per MD.    Significant Events Today: None.    ABG Results:   Recent Labs   Lab 11/26/22  1343 11/26/22  1121 11/25/22  1947 11/23/22  1107 11/23/22  0442 11/22/22  1839 11/22/22  1545   PH  --   --   --  7.34* 7.47* 7.47* 7.49*   PCO2  --   --   --  43 32* 33* 32*   PO2  --   --   --  99 100 104 136*   HCO3  --   --   --  23 23 24 24   O2PER 70 60 45 30 40 40 40       Current Vent Settings: Vent Mode: CMV/AC  (Continuous Mandatory Ventilation/ Assist Control)  FiO2 (%): 30 %  Resp Rate (Set): 16 breaths/min  Tidal Volume (Set, mL): 450 mL  PEEP (cm H2O): 8 cmH2O  Resp: 19      Skin Assessment: bloody upper lip, inner nares and outer. Optifoam in place under the ETAD.    Plan: Continue full ventilatory support and wean as tolerated.     Arvin High, RT on 11/27/2022 at 5:20 AM

## 2022-11-27 NOTE — CONSULTS
[facility] General Surgery Consultation    Blanco Osborne MRN# [id number]   YOB: 1965 Age: 57 year old      Date of Admission:  2022  Date of Consult: 2022         Assessment and Plan:   Patient is a 57 year old male status post PEA arrest, now with acute respiratory failure    PLAN:  -Continue medical management per intensivist  -Will discuss case with acute care surgeon covering this week to determine the most appropriate route for gastrostomy tube placement (IR vs laparoscopic vs open vs PEG) considering patient's past surgical history of liver transplant and evidence of ascites visualized on abdominal CT scan.  -General surgery to follow         Requesting Physician:               Chief Complaint:     Chief Complaint   Patient presents with     Cardiac Arrest          History of Present Illness:   Blanco Osborne is a 57 year old male who was seen in consultation at the request of  who presented following cardiac arrest.  The patient is currently intubated and unable to provide any history of present illness.  Review of the patient's chart reveals that the patient was admitted on 2022 after suffering a witnessed PEA arrest at home.  He was subsequently cooled and then rewarmed on .  Chest tube was placed for development of right pneumothorax on .  He was then extubated on  but required reintubation on  due to excessive secretions.  The patient was again extubated on .  On , he underwent left-sided thoracentesis with removal of 1.5 L bloody fluid.  His respiratory status continued to deteriorate and he was again reintubated.  Discussions were held with the family and the decision was made to proceed with trach/PEG.  Patient's past surgical history is significant for  donor liver transplant in 2016.          Physical Exam:   Blood pressure 106/60, pulse 81, temperature 98.7  F (37.1  C), temperature  source Temporal, resp. rate 16, height 1.829 m (6'), weight 91 kg (200 lb 9.9 oz), SpO2 99 %.  200 lbs 9.9 oz  General: Vital signs reviewed  Eyes: Anicteric  HENT: Normocephalic, atraumatic, trachea midline   Respiratory: Vented  Cardiovascular: Regular rate and rhythm  GI: Abdomen soft, slightly distended, no evidence of guarding with palpation; well-healed chevron incision  Musculoskeletal: No gross deformities  Neurologic: Grossly nonfocal exam, nods head to questioning  Integumentary: Warm and dry         Past Medical History:     Past Medical History:   Diagnosis Date     Acquired immunocompromised state (H) 2022     Ascites      Aspergillus pneumonia (H) 2022     Cirrhosis of liver with ascites (H) 2016     H/O alcohol abuse      HTN (hypertension)      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     NAFLD (nonalcoholic fatty liver disease) 2016     CHRISTIAN on CPAP      Parainfluenza type 1 infection 2022     SBP (spontaneous bacterial peritonitis) (H)     [facility]     Sepsis due to Streptococcus pneumoniae with acute hypoxic respiratory failure (H) 2022     Tubular adenoma 10/2019    Large cecal adenoma- due for surveillance colonoscopy in 3 years (10/2022)            Past Surgical History:     Past Surgical History:   Procedure Laterality Date     APPENDECTOMY       BENCH LIVER N/A 2016    Procedure: BENCH LIVER;  Surgeon: Enoc Crews MD;  Location: [facility] OR     COLONOSCOPY  13    repeat in 2018     COLONOSCOPY N/A 10/4/2019    Procedure: COLONOSCOPY, WITH POLYPECTOMY AND BIOPSY;  Surgeon: Go Chong MD;  Location: [facility] OR     HERNIA REPAIR       TRANSPLANT LIVER RECIPIENT  DONOR N/A 2016    Procedure: TRANSPLANT LIVER RECIPIENT  DONOR;  Surgeon: Enoc Crews MD;  Location: [facility] OR            Current Medications:           acetylcysteine  2 mL Nebulization 4x Daily     acyclovir   Topical Q8H     artificial tears  1 drop  Both Eyes TID     chlorhexidine  15 mL Mouth/Throat Q12H     heparin ANTICOAGULANT  5,000 Units Subcutaneous Q8H     heparin lock flush  5-20 mL Intracatheter Q24H     hydrocortisone sodium succinate PF  50 mg Intravenous Q6H     insulin aspart  1-12 Units Subcutaneous Q4H     insulin glargine  10 Units Subcutaneous QAM AC     ipratropium - albuterol 0.5 mg/2.5 mg/3 mL  3 mL Nebulization 4x daily     multivitamins w/minerals  15 mL Per Feeding Tube Daily     pantoprazole  40 mg Per Feeding Tube QAM AC    Or     pantoprazole  40 mg Intravenous QAM AC     polyethylene glycol  17 g Oral Daily     senna-docusate  1 tablet Oral BID    Or     senna-docusate  2 tablet Oral BID     sodium chloride (PF)  10-40 mL Intracatheter Q8H     sodium chloride (PF)  10-40 mL Intracatheter Q8H     sodium chloride (PF) 0.9%  10 mL Intracatheter Once in dialysis/CRRT     sodium chloride (PF) 0.9%  10 mL Intracatheter Once in dialysis/CRRT       sodium chloride 0.9%, albuterol, calcium gluconate, calcium gluconate, dextrose, glucose **OR** dextrose **OR** glucagon, fentaNYL, heparin lock flush, HYDROmorphone, lidocaine (buffered or not buffered), magnesium sulfate, - MEDICATION INSTRUCTIONS -, - MEDICATION INSTRUCTIONS -, metoprolol, naloxone **OR** naloxone **OR** naloxone **OR** naloxone, - MEDICATION INSTRUCTIONS -, ondansetron **OR** ondansetron, oxyCODONE, potassium chloride, prochlorperazine **OR** prochlorperazine **OR** prochlorperazine, promethazine, sodium chloride (PF), sodium chloride (PF), sodium chloride (PF), sodium phosphate, vecuronium, vecuronium         Home Medications:     Prior to Admission medications    Medication Sig Last Dose Taking? Auth Provider Long Term End Date   acetaminophen (TYLENOL) 500 MG tablet Take 1,000 mg by mouth every 6 hours as needed for mild pain Past Week at PRN Yes Reported, Patient     calcium carbonate (TUMS) 500 MG chewable tablet Take 1-2 chew tab by mouth 4 times daily as needed for  heartburn Past Week at often Yes Unknown, Entered By History     Cholecalciferol (VITAMIN D3 GUMMIES PO) Take 1 chew tab by mouth daily  Yes Unknown, Entered By History     famotidine (PEPCID) 20 MG tablet Take 20 mg by mouth 2 times daily as needed (heartburn) Past Week at often Yes Unknown, Entered By History     furosemide (LASIX) 20 MG tablet Take 20 mg by mouth once ?Past Week at ? x1 Yes Unknown, Entered By History No    Homeopathic Products (SLEEP CALM SLEEP RELIEF SL) Take by mouth nightly as needed Magnesium to help with sleep Past Week at hs Yes Unknown, Entered By History Yes    Multiple Vitamins-Minerals (PRESERVISION AREDS PO) Take 1 capsule by mouth daily  Yes Unknown, Entered By History     omeprazole 20 MG tablet Take 20 mg by mouth daily Past Week Yes Unknown, Entered By History     UNABLE TO FIND Take 2 chew tab by mouth daily [employer] Heart Chews (Grape Seed Extract 150mg + Beet Root Powder 500mg)  Yes Unknown, Entered By History     amLODIPine (NORVASC) 5 MG tablet TAKE 1 TABLET EVERY DAY   Juan Simms MD Yes    atenolol (TENORMIN) 50 MG tablet Take 50 mg by mouth daily   Unknown, Entered By History No    order for DME Equipment being ordered: Compression knee high stockings for B LE lymphedema 20-30mmHg  Patient not taking: Reported on 2/20/2019   Juan Simms MD     order for DME Equipment being ordered: Class 2 (30-40mmHg) compression knee high stockings, night time knee high compression alternative, bandaging supplies  Patient not taking: Reported on 2/20/2019   Juan Simms MD     tacrolimus (GENERIC EQUIVALENT) 1 MG capsule TAKE 1 CAPSULE EVERY MORNING  AND TAKE 2 CAPSULES EVERY EVENING  DOSED  12  HOURS APART Unknown at Only able to verify did not have AM 11/12/22  Juan Simms MD No             Allergies:   No Active Allergies         Family History:   Unable to obtain due to intubation        Social History:   Blanco Osborne  reports that he has never smoked. He has never  used smokeless tobacco. He reports that he does not currently use alcohol. He reports that he does not use drugs.          Review of Systems:   Unable to obtain due to intubation         Labs/Imaging   All new lab and imaging data was reviewed.   Recent Labs   Lab 11/27/22 0447 11/26/22 1121 11/26/22 0403   WBC 15.2* 17.7* 20.3*   HGB 8.3* 9.5* 10.5*   HCT 26.5* 31.0* 33.0*   * 110* 106*   PLT 99* 129* 129*     Recent Labs   Lab 11/27/22 0447 11/27/22  0020 11/26/22 2026 11/26/22  1524 11/26/22  1121 11/26/22  0411 11/26/22  0403 11/25/22  0812 11/25/22  0409     --   --   --  137  --  138   < > 135   POTASSIUM 4.1  --   --   --  4.2  --  4.3   < > 4.8   CHLORIDE 107  --   --   --  106  --  105   < > 104   CO2 24  --   --   --  23  --  27   < > 26   ANIONGAP 8  --   --   --  8  --  6   < > 5   * 110* 88   < > 123*   < > 120*   < > 140*   BUN 56*  --   --   --  37*  --  40*   < > 58*   CR 2.02*  --   --   --  1.29*  --  1.28*   < > 1.84*   GFRESTIMATED 38*  --   --   --  64  --  65   < > 42*   KELLY 7.7*  --   --   --  8.2*  --  8.7   < > 8.4*   MAG 2.1  --   --   --  1.9  --  1.9   < > 2.2   PHOS 4.3  --   --   --  4.5  --  4.5   < > 5.6*   PROTTOTAL 5.0*  --   --   --  5.7*  --  6.0*  --  5.6*   ALBUMIN 2.0*  --   --   --  2.3*  --  2.1*   < > 1.9*   BILITOTAL 1.1  --   --   --   --   --  1.6*  --  1.5*   ALKPHOS 177*  --   --   --   --   --  210*  --  192*   AST 33  --   --   --   --   --  39  --  34   ALT 29  --   --   --   --   --  37  --  34    < > = values in this interval not displayed.     I have personally reviewed the imaging studies-   CT CHEST ABDOMEN PELVIS W/O CONTRAST  LOCATION: [facility]  DATE/TIME: 11/12/2022 2:27 PM     FINDINGS:   LUNGS AND PLEURA: Large right and moderate left pleural effusion and dense consolidation/atelectasis in the lung bases. The endotracheal tube is in the midtrachea. The central tracheobronchial tree is  patent.     MEDIASTINUM/AXILLAE: Several small bilateral supraclavicular lymph nodes, indeterminate. No lymphadenopathy in the axillae or mediastinum. No thoracic aortic aneurysm. Mild generalized cardiomegaly. Small pericardial effusion.     CORONARY ARTERY CALCIFICATION: Moderate.     HEPATOBILIARY: Postoperative changes of disease donor liver transplant. Cholecystectomy. No definite focal masses in the hepatic allograft on noncontrast CT.     PANCREAS: No duct dilatation or peripancreatic changes. No definite masses on noncontrast CT.     SPLEEN: Stable mild splenomegaly.     ADRENAL GLANDS: Mild diffuse thickening of the adrenal glands.     KIDNEYS/BLADDER: Small nonobstructing bilateral renal calculi measuring 3-4 mm. No hydronephrosis or perinephric stranding bilaterally. No renal masses evident on noncontrast CT. Nixon catheter decompresses the urinary bladder.     BOWEL: NG tube is in the stomach. No small bowel or colonic obstruction or inflammatory changes.     LYMPH NODES: No lymphadenopathy.     VASCULATURE: No abdominal aortic aneurysm.     PELVIC ORGANS: No pelvic masses.     OTHER: Moderate ascites. Mild anasarca. No free air or fluid collections. Right common femoral central venous catheter with tip in the mid IVC, at the level of the renal vein confluence.     MUSCULOSKELETAL: Old healed rib fracture deformities. No suspicious lesions in the bones.                                                                      IMPRESSION:  1.  Large right and moderate left pleural effusion and bibasilar dense atelectasis/consolidation.  2.  Moderate ascites.  3.  Stable appearance of the hepatic allograft on noncontrast CT.  4.  Stable mild splenomegaly.  5.  Mild generalized cardiac enlargement and small pericardial effusion.  6.  Numerous small supraclavicular lymph nodes are nonspecific and likely reactive.      Annabel Ledezma MD

## 2022-11-27 NOTE — PROGRESS NOTES
Pt intubated and sedated in ICU. Follows commands. SOTO- very weak. Dilaudid given x1 for pain- pt denies need for additional intervention despite nodding yes when asked if having pain. SR, mayelin titrated to maintain MAP >65. Ventilator settings remain 16/450/8/30%. Anuric. No BM. Continue to closely monitor.

## 2022-11-27 NOTE — PROGRESS NOTES
Renal Medicine Progress Note            Assessment/Plan:     Blanco Osborne is a 57-year-old male with PMH CARRILLO s/p liver transplant (9/2016) and cirrhosis admitted 11/12/2022 with out of hospital PEA arrest and acute hypoxemic respiratory failure.  Course complicated by parainfluenza virus, streptococcal bacteremia, and LORETTA requiring CRRT.      # Severe anuric LORETTA:                -CRRT stopped 11/22 and resume on 11/24 and stopped 11/26.      # Septic shock with MODS:                -phenylephrine 0.5 mcg                 # Respiratory failure: Re-intubated 11/21g. Re-intubated again for the third time. CXR personally reviewed.    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 On phenylephrine.      Plan:  # No indication for RRT today. Will evaluate daily for RRT.   # ICU team is planning PEG and possible trach (re-intubated a 3rd time shortly after extubation).      I discussed the case with Dr. Jones in person.           Interval History:     Afebrile.   On phenylephrine.   Re-intubated a third time.   Patient is awake and following verbal commands.           Medications and Allergies:       acetylcysteine  2 mL Nebulization 4x Daily     acyclovir   Topical Q8H     artificial tears  1 drop Both Eyes TID     chlorhexidine  15 mL Mouth/Throat Q12H     heparin ANTICOAGULANT  5,000 Units Subcutaneous Q8H     heparin lock flush  5-20 mL Intracatheter Q24H     hydrocortisone sodium succinate PF  50 mg Intravenous Q6H     insulin aspart  1-12 Units Subcutaneous Q4H     insulin glargine  10 Units Subcutaneous QAM AC     ipratropium - albuterol 0.5 mg/2.5 mg/3 mL  3 mL Nebulization 4x daily     multivitamins w/minerals  15 mL Per Feeding Tube Daily     pantoprazole  40 mg Per Feeding Tube QAM AC    Or     pantoprazole  40 mg Intravenous QAM AC      polyethylene glycol  17 g Oral Daily     senna-docusate  1 tablet Oral BID    Or     senna-docusate  2 tablet Oral BID     sodium chloride (PF)  10-40 mL Intracatheter Q8H     sodium chloride (PF)  10-40 mL Intracatheter Q8H     sodium chloride (PF) 0.9%  10 mL Intracatheter Once in dialysis/CRRT     sodium chloride (PF) 0.9%  10 mL Intracatheter Once in dialysis/CRRT      No Active Allergies         Physical Exam:   Vitals were reviewed   , Blood pressure 105/67, pulse 84, temperature 98.2  F (36.8  C), temperature source Temporal, resp. rate 21, height 1.829 m (6'), weight 91 kg (200 lb 9.9 oz), SpO2 98 %.    Wt Readings from Last 3 Encounters:   11/27/22 91 kg (200 lb 9.9 oz)   10/07/19 137.5 kg (303 lb 3.2 oz)   10/04/19 131.5 kg (290 lb)       Intake/Output Summary (Last 24 hours) at 11/27/2022 1035  Last data filed at 11/27/2022 1000  Gross per 24 hour   Intake 1747.8 ml   Output 70 ml   Net 1677.8 ml     GENERAL APPEARANCE: NAD.   HEENT:  Intubated. Awake.   RESP: No wheezes. Good airflow.   CV: RRR, nl S1/S2  ABDOMEN: distended, soft.   EXTREMITIES/SKIN: Some edema at the thighs and back but not bad,   NEURO: Awake and following verbal commands.   RIJ temp cvc          Data:     CBC RESULTS:     Recent Labs   Lab 11/27/22  0447 11/26/22  1121 11/26/22  0403 11/25/22  1947 11/25/22  1150 11/25/22  0409   WBC 15.2* 17.7* 20.3* 21.8* 14.1* 12.2*   RBC 2.48* 2.82* 3.11* 3.16* 2.84* 2.78*   HGB 8.3* 9.5* 10.5* 10.6* 9.5* 9.4*   HCT 26.5* 31.0* 33.0* 34.0* 30.3* 30.0*   PLT 99* 129* 129* 192 110* 82*       Basic Metabolic Panel:  Recent Labs   Lab 11/27/22  0921 11/27/22  0447 11/27/22  0020 11/26/22 2026 11/26/22  1524 11/26/22  1121 11/26/22  0411 11/26/22  0403 11/25/22  1953 11/25/22  1947 11/25/22  1654 11/25/22  1150 11/25/22  0812 11/25/22  0409   NA  --  139  --   --   --  137  --  138  --  136  --  139  --  135   POTASSIUM  --  4.1  --   --   --  4.2  --  4.3  --  4.7  --  4.8  --  4.8   CHLORIDE  --   107  --   --   --  106  --  105  --  104  --  106  --  104   CO2  --  24  --   --   --  23  --  27  --  24  --  23  --  26   BUN  --  56*  --   --   --  37*  --  40*  --  48*  --  58*  --  58*   CR  --  2.02*  --   --   --  1.29*  --  1.28*  --  1.43*  --  1.79*  --  1.84*   * 175* 110* 88 93 123*   < > 120*   < > 151*   < > 140*   < > 140*   KELLY  --  7.7*  --   --   --  8.2*  --  8.7  --  9.1  --  7.9*  --  8.4*    < > = values in this interval not displayed.       INRNo lab results found in last 7 days.   Attestation:   I have reviewed today's relevant vital signs, notes, medications, labs and imaging.    CXR:  IMPRESSION: Tubes and lines are in stable position. No significant change in bilateral pleural effusions and bibasilar consolidations.    Zenon Bradshaw MD  [facility] - Nephrology  Office phone :[phone]  Pager: [phone]

## 2022-11-27 NOTE — H&P (VIEW-ONLY)
[facility] General Surgery Consultation    Blanco Osborne MRN# [id number]   YOB: 1965 Age: 57 year old      Date of Admission:  2022  Date of Consult: 2022         Assessment and Plan:   Patient is a 57 year old male status post PEA arrest, now with acute respiratory failure    PLAN:  -Continue medical management per intensivist  -Will discuss case with acute care surgeon covering this week to determine the most appropriate route for gastrostomy tube placement (IR vs laparoscopic vs open vs PEG) considering patient's past surgical history of liver transplant and evidence of ascites visualized on abdominal CT scan.  -General surgery to follow         Requesting Physician:               Chief Complaint:     Chief Complaint   Patient presents with     Cardiac Arrest          History of Present Illness:   Blanco Osborne is a 57 year old male who was seen in consultation at the request of  who presented following cardiac arrest.  The patient is currently intubated and unable to provide any history of present illness.  Review of the patient's chart reveals that the patient was admitted on 2022 after suffering a witnessed PEA arrest at home.  He was subsequently cooled and then rewarmed on .  Chest tube was placed for development of right pneumothorax on .  He was then extubated on  but required reintubation on  due to excessive secretions.  The patient was again extubated on .  On , he underwent left-sided thoracentesis with removal of 1.5 L bloody fluid.  His respiratory status continued to deteriorate and he was again reintubated.  Discussions were held with the family and the decision was made to proceed with trach/PEG.  Patient's past surgical history is significant for  donor liver transplant in 2016.          Physical Exam:   Blood pressure 106/60, pulse 81, temperature 98.7  F (37.1  C), temperature  source Temporal, resp. rate 16, height 1.829 m (6'), weight 91 kg (200 lb 9.9 oz), SpO2 99 %.  200 lbs 9.9 oz  General: Vital signs reviewed  Eyes: Anicteric  HENT: Normocephalic, atraumatic, trachea midline   Respiratory: Vented  Cardiovascular: Regular rate and rhythm  GI: Abdomen soft, slightly distended, no evidence of guarding with palpation; well-healed chevron incision  Musculoskeletal: No gross deformities  Neurologic: Grossly nonfocal exam, nods head to questioning  Integumentary: Warm and dry         Past Medical History:     Past Medical History:   Diagnosis Date     Acquired immunocompromised state (H) 2022     Ascites      Aspergillus pneumonia (H) 2022     Cirrhosis of liver with ascites (H) 2016     H/O alcohol abuse      HTN (hypertension)      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     NAFLD (nonalcoholic fatty liver disease) 2016     CHRISTIAN on CPAP      Parainfluenza type 1 infection 2022     SBP (spontaneous bacterial peritonitis) (H)     [facility]     Sepsis due to Streptococcus pneumoniae with acute hypoxic respiratory failure (H) 2022     Tubular adenoma 10/2019    Large cecal adenoma- due for surveillance colonoscopy in 3 years (10/2022)            Past Surgical History:     Past Surgical History:   Procedure Laterality Date     APPENDECTOMY       BENCH LIVER N/A 2016    Procedure: BENCH LIVER;  Surgeon: Enoc Crews MD;  Location: [facility] OR     COLONOSCOPY  13    repeat in 2018     COLONOSCOPY N/A 10/4/2019    Procedure: COLONOSCOPY, WITH POLYPECTOMY AND BIOPSY;  Surgeon: Go Chong MD;  Location: [facility] OR     HERNIA REPAIR       TRANSPLANT LIVER RECIPIENT  DONOR N/A 2016    Procedure: TRANSPLANT LIVER RECIPIENT  DONOR;  Surgeon: Enoc Crews MD;  Location: [facility] OR            Current Medications:           acetylcysteine  2 mL Nebulization 4x Daily     acyclovir   Topical Q8H     artificial tears  1 drop  Both Eyes TID     chlorhexidine  15 mL Mouth/Throat Q12H     heparin ANTICOAGULANT  5,000 Units Subcutaneous Q8H     heparin lock flush  5-20 mL Intracatheter Q24H     hydrocortisone sodium succinate PF  50 mg Intravenous Q6H     insulin aspart  1-12 Units Subcutaneous Q4H     insulin glargine  10 Units Subcutaneous QAM AC     ipratropium - albuterol 0.5 mg/2.5 mg/3 mL  3 mL Nebulization 4x daily     multivitamins w/minerals  15 mL Per Feeding Tube Daily     pantoprazole  40 mg Per Feeding Tube QAM AC    Or     pantoprazole  40 mg Intravenous QAM AC     polyethylene glycol  17 g Oral Daily     senna-docusate  1 tablet Oral BID    Or     senna-docusate  2 tablet Oral BID     sodium chloride (PF)  10-40 mL Intracatheter Q8H     sodium chloride (PF)  10-40 mL Intracatheter Q8H     sodium chloride (PF) 0.9%  10 mL Intracatheter Once in dialysis/CRRT     sodium chloride (PF) 0.9%  10 mL Intracatheter Once in dialysis/CRRT       sodium chloride 0.9%, albuterol, calcium gluconate, calcium gluconate, dextrose, glucose **OR** dextrose **OR** glucagon, fentaNYL, heparin lock flush, HYDROmorphone, lidocaine (buffered or not buffered), magnesium sulfate, - MEDICATION INSTRUCTIONS -, - MEDICATION INSTRUCTIONS -, metoprolol, naloxone **OR** naloxone **OR** naloxone **OR** naloxone, - MEDICATION INSTRUCTIONS -, ondansetron **OR** ondansetron, oxyCODONE, potassium chloride, prochlorperazine **OR** prochlorperazine **OR** prochlorperazine, promethazine, sodium chloride (PF), sodium chloride (PF), sodium chloride (PF), sodium phosphate, vecuronium, vecuronium         Home Medications:     Prior to Admission medications    Medication Sig Last Dose Taking? Auth Provider Long Term End Date   acetaminophen (TYLENOL) 500 MG tablet Take 1,000 mg by mouth every 6 hours as needed for mild pain Past Week at PRN Yes Reported, Patient     calcium carbonate (TUMS) 500 MG chewable tablet Take 1-2 chew tab by mouth 4 times daily as needed for  heartburn Past Week at often Yes Unknown, Entered By History     Cholecalciferol (VITAMIN D3 GUMMIES PO) Take 1 chew tab by mouth daily  Yes Unknown, Entered By History     famotidine (PEPCID) 20 MG tablet Take 20 mg by mouth 2 times daily as needed (heartburn) Past Week at often Yes Unknown, Entered By History     furosemide (LASIX) 20 MG tablet Take 20 mg by mouth once ?Past Week at ? x1 Yes Unknown, Entered By History No    Homeopathic Products (SLEEP CALM SLEEP RELIEF SL) Take by mouth nightly as needed Magnesium to help with sleep Past Week at hs Yes Unknown, Entered By History Yes    Multiple Vitamins-Minerals (PRESERVISION AREDS PO) Take 1 capsule by mouth daily  Yes Unknown, Entered By History     omeprazole 20 MG tablet Take 20 mg by mouth daily Past Week Yes Unknown, Entered By History     UNABLE TO FIND Take 2 chew tab by mouth daily [employer] Heart Chews (Grape Seed Extract 150mg + Beet Root Powder 500mg)  Yes Unknown, Entered By History     amLODIPine (NORVASC) 5 MG tablet TAKE 1 TABLET EVERY DAY   Juan Simms MD Yes    atenolol (TENORMIN) 50 MG tablet Take 50 mg by mouth daily   Unknown, Entered By History No    order for DME Equipment being ordered: Compression knee high stockings for B LE lymphedema 20-30mmHg  Patient not taking: Reported on 2/20/2019   Juan Simms MD     order for DME Equipment being ordered: Class 2 (30-40mmHg) compression knee high stockings, night time knee high compression alternative, bandaging supplies  Patient not taking: Reported on 2/20/2019   Juan Simms MD     tacrolimus (GENERIC EQUIVALENT) 1 MG capsule TAKE 1 CAPSULE EVERY MORNING  AND TAKE 2 CAPSULES EVERY EVENING  DOSED  12  HOURS APART Unknown at Only able to verify did not have AM 11/12/22  Juan Simms MD No             Allergies:   No Active Allergies         Family History:   Unable to obtain due to intubation        Social History:   Blanco Osborne  reports that he has never smoked. He has never  used smokeless tobacco. He reports that he does not currently use alcohol. He reports that he does not use drugs.          Review of Systems:   Unable to obtain due to intubation         Labs/Imaging   All new lab and imaging data was reviewed.   Recent Labs   Lab 11/27/22 0447 11/26/22 1121 11/26/22 0403   WBC 15.2* 17.7* 20.3*   HGB 8.3* 9.5* 10.5*   HCT 26.5* 31.0* 33.0*   * 110* 106*   PLT 99* 129* 129*     Recent Labs   Lab 11/27/22 0447 11/27/22  0020 11/26/22 2026 11/26/22  1524 11/26/22  1121 11/26/22  0411 11/26/22  0403 11/25/22  0812 11/25/22  0409     --   --   --  137  --  138   < > 135   POTASSIUM 4.1  --   --   --  4.2  --  4.3   < > 4.8   CHLORIDE 107  --   --   --  106  --  105   < > 104   CO2 24  --   --   --  23  --  27   < > 26   ANIONGAP 8  --   --   --  8  --  6   < > 5   * 110* 88   < > 123*   < > 120*   < > 140*   BUN 56*  --   --   --  37*  --  40*   < > 58*   CR 2.02*  --   --   --  1.29*  --  1.28*   < > 1.84*   GFRESTIMATED 38*  --   --   --  64  --  65   < > 42*   KELLY 7.7*  --   --   --  8.2*  --  8.7   < > 8.4*   MAG 2.1  --   --   --  1.9  --  1.9   < > 2.2   PHOS 4.3  --   --   --  4.5  --  4.5   < > 5.6*   PROTTOTAL 5.0*  --   --   --  5.7*  --  6.0*  --  5.6*   ALBUMIN 2.0*  --   --   --  2.3*  --  2.1*   < > 1.9*   BILITOTAL 1.1  --   --   --   --   --  1.6*  --  1.5*   ALKPHOS 177*  --   --   --   --   --  210*  --  192*   AST 33  --   --   --   --   --  39  --  34   ALT 29  --   --   --   --   --  37  --  34    < > = values in this interval not displayed.     I have personally reviewed the imaging studies-   CT CHEST ABDOMEN PELVIS W/O CONTRAST  LOCATION: [facility]  DATE/TIME: 11/12/2022 2:27 PM     FINDINGS:   LUNGS AND PLEURA: Large right and moderate left pleural effusion and dense consolidation/atelectasis in the lung bases. The endotracheal tube is in the midtrachea. The central tracheobronchial tree is  patent.     MEDIASTINUM/AXILLAE: Several small bilateral supraclavicular lymph nodes, indeterminate. No lymphadenopathy in the axillae or mediastinum. No thoracic aortic aneurysm. Mild generalized cardiomegaly. Small pericardial effusion.     CORONARY ARTERY CALCIFICATION: Moderate.     HEPATOBILIARY: Postoperative changes of disease donor liver transplant. Cholecystectomy. No definite focal masses in the hepatic allograft on noncontrast CT.     PANCREAS: No duct dilatation or peripancreatic changes. No definite masses on noncontrast CT.     SPLEEN: Stable mild splenomegaly.     ADRENAL GLANDS: Mild diffuse thickening of the adrenal glands.     KIDNEYS/BLADDER: Small nonobstructing bilateral renal calculi measuring 3-4 mm. No hydronephrosis or perinephric stranding bilaterally. No renal masses evident on noncontrast CT. Nixon catheter decompresses the urinary bladder.     BOWEL: NG tube is in the stomach. No small bowel or colonic obstruction or inflammatory changes.     LYMPH NODES: No lymphadenopathy.     VASCULATURE: No abdominal aortic aneurysm.     PELVIC ORGANS: No pelvic masses.     OTHER: Moderate ascites. Mild anasarca. No free air or fluid collections. Right common femoral central venous catheter with tip in the mid IVC, at the level of the renal vein confluence.     MUSCULOSKELETAL: Old healed rib fracture deformities. No suspicious lesions in the bones.                                                                      IMPRESSION:  1.  Large right and moderate left pleural effusion and bibasilar dense atelectasis/consolidation.  2.  Moderate ascites.  3.  Stable appearance of the hepatic allograft on noncontrast CT.  4.  Stable mild splenomegaly.  5.  Mild generalized cardiac enlargement and small pericardial effusion.  6.  Numerous small supraclavicular lymph nodes are nonspecific and likely reactive.      Annabel Ledezma MD

## 2022-11-27 NOTE — PROGRESS NOTES
7a-4p    Neuro: Alert. Follow commands. Answer to simple questions with nodding, not always consistent.   Pulm: LS coarse/diminshed. On full vent support. Creamy ETT and oral secretions  CV: SR. On low dose mayelin. Orthostatic BP noticed with sitting position  : on HD. Anuric.   GI: Loose BMx2 this shift. On TF via OG.   Extremities: Purposeful movements. Up in chair with lift.   Skin: Scabs and bruises throughout. R chest CT patent with water seal.   Lines: RIJ HD catheter. PICC on RUE. PIVx1 SL on LUE  Family: updated at bedside throughout the shift  Plan: Trach and PEG next week.

## 2022-11-27 NOTE — PROGRESS NOTES
[facility] ICU RESPIRATORY NOTE        Date of Admission: 11/12/2022    Date of Intubation (most recent): 11/26/22    Reason for Mechanical Ventilation: Resp failure, AW protection    Number of Days on Mechanical Ventilation: 1    Met Criteria for Spontaneous Breathing Trial: No    Reason for No Spontaneous Breathing Trial: Per MD    Significant Events Today:    Re-intubated at 1240 for respiratory failure.     Rt called for sudden desaturation to low 70's shortly after intubation. Copious thick secretions suctioned via in-line suction cath. SpO2 returned to 100% almost immediately following suction. Most likely mucus-plugged.   Left-sided Thoracentesis performed today.      ABG Results:   Recent Labs   Lab 11/26/22  1343 11/26/22  1121 11/25/22  1947 11/23/22  1107 11/23/22  0442 11/22/22  1839 11/22/22  1545   PH  --   --   --  7.34* 7.47* 7.47* 7.49*   PCO2  --   --   --  43 32* 33* 32*   PO2  --   --   --  99 100 104 136*   HCO3  --   --   --  23 23 24 24   O2PER 70 60 45 30 40 40 40       Current Vent Settings: Vent Mode: CMV/AC  (Continuous Mandatory Ventilation/ Assist Control)  FiO2 (%): 30 %  Resp Rate (Set): 16 breaths/min  Tidal Volume (Set, mL): 450 mL  PEEP (cm H2O): 7 cmH2O  Resp: 15      Skin Assessment: Upper lip with dried blood. Outer nares with dried blood. Optifoam placed under ETAD    Plan: RT to continue aggressive pulmonary hygiene and full vent support, with daily wean assessment.     Guido Mclain, RT on 11/26/2022 at 6:16 PM

## 2022-11-28 LAB
ALBUMIN SERPL-MCNC: 2 G/DL (ref 3.4–5)
ALP SERPL-CCNC: 250 U/L (ref 40–150)
ALT SERPL W P-5'-P-CCNC: 30 U/L (ref 0–70)
ANION GAP SERPL CALCULATED.3IONS-SCNC: 12 MMOL/L (ref 3–14)
AST SERPL W P-5'-P-CCNC: 23 U/L (ref 0–45)
BILIRUB SERPL-MCNC: 1 MG/DL (ref 0.2–1.3)
BUN SERPL-MCNC: 83 MG/DL (ref 7–30)
CALCIUM SERPL-MCNC: 7.9 MG/DL (ref 8.5–10.1)
CHLORIDE BLD-SCNC: 105 MMOL/L (ref 94–109)
CO2 SERPL-SCNC: 23 MMOL/L (ref 20–32)
CREAT SERPL-MCNC: 2.86 MG/DL (ref 0.66–1.25)
ERYTHROCYTE [DISTWIDTH] IN BLOOD BY AUTOMATED COUNT: 19.2 % (ref 10–15)
GFR SERPL CREATININE-BSD FRML MDRD: 25 ML/MIN/1.73M2
GLUCOSE BLD-MCNC: 245 MG/DL (ref 70–99)
GLUCOSE BLDC GLUCOMTR-MCNC: 150 MG/DL (ref 70–99)
GLUCOSE BLDC GLUCOMTR-MCNC: 157 MG/DL (ref 70–99)
GLUCOSE BLDC GLUCOMTR-MCNC: 169 MG/DL (ref 70–99)
GLUCOSE BLDC GLUCOMTR-MCNC: 184 MG/DL (ref 70–99)
GLUCOSE BLDC GLUCOMTR-MCNC: 200 MG/DL (ref 70–99)
GLUCOSE BLDC GLUCOMTR-MCNC: 228 MG/DL (ref 70–99)
HCT VFR BLD AUTO: 26 % (ref 40–53)
HGB BLD-MCNC: 8.3 G/DL (ref 13.3–17.7)
MCH RBC QN AUTO: 33.6 PG (ref 26.5–33)
MCHC RBC AUTO-ENTMCNC: 31.9 G/DL (ref 31.5–36.5)
MCV RBC AUTO: 105 FL (ref 78–100)
PHOSPHATE SERPL-MCNC: 3.7 MG/DL (ref 2.5–4.5)
PLATELET # BLD AUTO: 76 10E3/UL (ref 150–450)
POTASSIUM BLD-SCNC: 4.3 MMOL/L (ref 3.4–5.3)
PROCALCITONIN SERPL-MCNC: 0.89 NG/ML
PROT SERPL-MCNC: 5.1 G/DL (ref 6.8–8.8)
RBC # BLD AUTO: 2.47 10E6/UL (ref 4.4–5.9)
SODIUM SERPL-SCNC: 140 MMOL/L (ref 133–144)
WBC # BLD AUTO: 13.9 10E3/UL (ref 4–11)

## 2022-11-28 PROCEDURE — 94640 AIRWAY INHALATION TREATMENT: CPT

## 2022-11-28 PROCEDURE — 84145 PROCALCITONIN (PCT): CPT | Performed by: INTERNAL MEDICINE

## 2022-11-28 PROCEDURE — 250N000013 HC RX MED GY IP 250 OP 250 PS 637: Performed by: STUDENT IN AN ORGANIZED HEALTH CARE EDUCATION/TRAINING PROGRAM

## 2022-11-28 PROCEDURE — 250N000011 HC RX IP 250 OP 636: Performed by: INTERNAL MEDICINE

## 2022-11-28 PROCEDURE — 94640 AIRWAY INHALATION TREATMENT: CPT | Mod: 76

## 2022-11-28 PROCEDURE — 99291 CRITICAL CARE FIRST HOUR: CPT | Performed by: INTERNAL MEDICINE

## 2022-11-28 PROCEDURE — 94003 VENT MGMT INPAT SUBQ DAY: CPT

## 2022-11-28 PROCEDURE — 94668 MNPJ CHEST WALL SBSQ: CPT

## 2022-11-28 PROCEDURE — 84100 ASSAY OF PHOSPHORUS: CPT | Performed by: STUDENT IN AN ORGANIZED HEALTH CARE EDUCATION/TRAINING PROGRAM

## 2022-11-28 PROCEDURE — 250N000013 HC RX MED GY IP 250 OP 250 PS 637: Performed by: INTERNAL MEDICINE

## 2022-11-28 PROCEDURE — 99233 SBSQ HOSP IP/OBS HIGH 50: CPT | Performed by: INTERNAL MEDICINE

## 2022-11-28 PROCEDURE — 999N000253 HC STATISTIC WEANING TRIALS

## 2022-11-28 PROCEDURE — 250N000009 HC RX 250: Performed by: INTERNAL MEDICINE

## 2022-11-28 PROCEDURE — 99232 SBSQ HOSP IP/OBS MODERATE 35: CPT | Performed by: INTERNAL MEDICINE

## 2022-11-28 PROCEDURE — 250N000013 HC RX MED GY IP 250 OP 250 PS 637: Performed by: SPECIALIST

## 2022-11-28 PROCEDURE — 258N000003 HC RX IP 258 OP 636: Performed by: INTERNAL MEDICINE

## 2022-11-28 PROCEDURE — 99292 CRITICAL CARE ADDL 30 MIN: CPT | Performed by: INTERNAL MEDICINE

## 2022-11-28 PROCEDURE — 85027 COMPLETE CBC AUTOMATED: CPT | Performed by: INTERNAL MEDICINE

## 2022-11-28 PROCEDURE — 200N000001 HC R&B ICU

## 2022-11-28 PROCEDURE — 999N000009 HC STATISTIC AIRWAY CARE

## 2022-11-28 PROCEDURE — 999N000157 HC STATISTIC RCP TIME EA 10 MIN

## 2022-11-28 PROCEDURE — 80053 COMPREHEN METABOLIC PANEL: CPT | Performed by: INTERNAL MEDICINE

## 2022-11-28 RX ADMIN — INSULIN ASPART 4 UNITS: 100 INJECTION, SOLUTION INTRAVENOUS; SUBCUTANEOUS at 00:11

## 2022-11-28 RX ADMIN — PHENYLEPHRINE HYDROCHLORIDE 0.2 MCG/KG/MIN: 10 INJECTION INTRAVENOUS at 04:49

## 2022-11-28 RX ADMIN — HEPARIN SODIUM 5000 UNITS: 5000 INJECTION, SOLUTION INTRAVENOUS; SUBCUTANEOUS at 21:36

## 2022-11-28 RX ADMIN — OXYCODONE HYDROCHLORIDE 5 MG: 5 TABLET ORAL at 05:06

## 2022-11-28 RX ADMIN — INSULIN ASPART 1 UNITS: 100 INJECTION, SOLUTION INTRAVENOUS; SUBCUTANEOUS at 19:48

## 2022-11-28 RX ADMIN — SODIUM CHLORIDE: 9 INJECTION, SOLUTION INTRAVENOUS at 04:50

## 2022-11-28 RX ADMIN — HYDROCORTISONE SODIUM SUCCINATE 50 MG: 100 INJECTION, POWDER, FOR SOLUTION INTRAMUSCULAR; INTRAVENOUS at 00:22

## 2022-11-28 RX ADMIN — Medication 1 DROP: at 21:36

## 2022-11-28 RX ADMIN — IPRATROPIUM BROMIDE AND ALBUTEROL SULFATE 3 ML: .5; 3 SOLUTION RESPIRATORY (INHALATION) at 18:49

## 2022-11-28 RX ADMIN — Medication 1 DROP: at 08:50

## 2022-11-28 RX ADMIN — CHLORHEXIDINE GLUCONATE 0.12% ORAL RINSE 15 ML: 1.2 LIQUID ORAL at 19:48

## 2022-11-28 RX ADMIN — CEFTRIAXONE SODIUM 2 G: 2 INJECTION, POWDER, FOR SOLUTION INTRAMUSCULAR; INTRAVENOUS at 04:54

## 2022-11-28 RX ADMIN — ACETYLCYSTEINE 2 ML: 200 SOLUTION ORAL; RESPIRATORY (INHALATION) at 18:49

## 2022-11-28 RX ADMIN — OXYCODONE HYDROCHLORIDE 5 MG: 5 TABLET ORAL at 00:25

## 2022-11-28 RX ADMIN — ACETYLCYSTEINE 2 ML: 200 SOLUTION ORAL; RESPIRATORY (INHALATION) at 11:11

## 2022-11-28 RX ADMIN — HEPARIN SODIUM 5000 UNITS: 5000 INJECTION, SOLUTION INTRAVENOUS; SUBCUTANEOUS at 13:50

## 2022-11-28 RX ADMIN — SODIUM CHLORIDE, PRESERVATIVE FREE 5 ML: 5 INJECTION INTRAVENOUS at 16:58

## 2022-11-28 RX ADMIN — ACYCLOVIR: 50 OINTMENT TOPICAL at 21:37

## 2022-11-28 RX ADMIN — INSULIN ASPART 1 UNITS: 100 INJECTION, SOLUTION INTRAVENOUS; SUBCUTANEOUS at 16:23

## 2022-11-28 RX ADMIN — Medication 40 MG: at 07:08

## 2022-11-28 RX ADMIN — INSULIN GLARGINE 10 UNITS: 100 INJECTION, SOLUTION SUBCUTANEOUS at 08:49

## 2022-11-28 RX ADMIN — ACETYLCYSTEINE 2 ML: 200 SOLUTION ORAL; RESPIRATORY (INHALATION) at 14:52

## 2022-11-28 RX ADMIN — ACYCLOVIR: 50 OINTMENT TOPICAL at 07:06

## 2022-11-28 RX ADMIN — CHLORHEXIDINE GLUCONATE 0.12% ORAL RINSE 15 ML: 1.2 LIQUID ORAL at 08:49

## 2022-11-28 RX ADMIN — ACYCLOVIR: 50 OINTMENT TOPICAL at 13:53

## 2022-11-28 RX ADMIN — INSULIN ASPART 2 UNITS: 100 INJECTION, SOLUTION INTRAVENOUS; SUBCUTANEOUS at 05:15

## 2022-11-28 RX ADMIN — Medication 15 ML: at 08:50

## 2022-11-28 RX ADMIN — HEPARIN SODIUM 5000 UNITS: 5000 INJECTION, SOLUTION INTRAVENOUS; SUBCUTANEOUS at 07:06

## 2022-11-28 RX ADMIN — IPRATROPIUM BROMIDE AND ALBUTEROL SULFATE 3 ML: .5; 3 SOLUTION RESPIRATORY (INHALATION) at 14:52

## 2022-11-28 RX ADMIN — IPRATROPIUM BROMIDE AND ALBUTEROL SULFATE 3 ML: .5; 3 SOLUTION RESPIRATORY (INHALATION) at 11:11

## 2022-11-28 RX ADMIN — INSULIN ASPART 3 UNITS: 100 INJECTION, SOLUTION INTRAVENOUS; SUBCUTANEOUS at 09:07

## 2022-11-28 RX ADMIN — ACETYLCYSTEINE 2 ML: 200 SOLUTION ORAL; RESPIRATORY (INHALATION) at 07:10

## 2022-11-28 RX ADMIN — HYDROCORTISONE SODIUM SUCCINATE 50 MG: 100 INJECTION, POWDER, FOR SOLUTION INTRAMUSCULAR; INTRAVENOUS at 18:10

## 2022-11-28 RX ADMIN — HYDROCORTISONE SODIUM SUCCINATE 50 MG: 100 INJECTION, POWDER, FOR SOLUTION INTRAMUSCULAR; INTRAVENOUS at 07:03

## 2022-11-28 RX ADMIN — HYDROCORTISONE SODIUM SUCCINATE 50 MG: 100 INJECTION, POWDER, FOR SOLUTION INTRAMUSCULAR; INTRAVENOUS at 12:24

## 2022-11-28 RX ADMIN — INSULIN ASPART 2 UNITS: 100 INJECTION, SOLUTION INTRAVENOUS; SUBCUTANEOUS at 12:28

## 2022-11-28 RX ADMIN — Medication 1 DROP: at 16:57

## 2022-11-28 RX ADMIN — IPRATROPIUM BROMIDE AND ALBUTEROL SULFATE 3 ML: .5; 3 SOLUTION RESPIRATORY (INHALATION) at 07:10

## 2022-11-28 ASSESSMENT — ACTIVITIES OF DAILY LIVING (ADL)
ADLS_ACUITY_SCORE: 47

## 2022-11-28 NOTE — PLAN OF CARE
Care plan note:      Recent Vitals:  Temp: 98  F (36.7  C) Temp src: Axillary BP: 103/63 Pulse: 95   Resp: 16 SpO2: 97 % O2 Device: Mechanical Ventilator      Orientation/Neuro: Alert, Pupils equal and reactive, Vented, difficult to assess orientation, left shoulder weaker than rt.  Pt doesn't focus to assess accomodation.   Pain: Pain is controlled with current analgesics.  Medication(s) being used: oxycodone   Tele: Sinus rhythm    IV medications: Central Line PICC on Rt UE, Labs drawn w/o complication and Neosynephrine, HD Port CDI   Mobility: Assist of 2 and Total w/ lift   Skin: Bruises: scattered and excoriated left midline chest. scabbed nose and lips   Respiratory: Vented, CMV mode and Tolerating well  GI: Incontinnet of stool  : Anuria     Diet: Tolerating diet:  Strict NPO - TF running at goal  Orders Placed This Encounter      NPO for Medical/Clinical Reasons Except for: Meds, Ice Chips      Safety/Concerns:  Fall Risk and Kareem Risk  Aggression Color: Green    Followed By: Intensivist, ID and nephro  Plan: Thoracic to consult and surgery following for possible trach/peg on Tuesday. Possible  First run of HD today. PT/OT following. Weaned mayelin down, unable to wean off.  Chest tube to water seal, minimal output.    Continue to monitor.      Hansa Hartman RN

## 2022-11-28 NOTE — PROGRESS NOTES
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  Likely secondary to acute respiratory failure and COPD exacerbation and parainfluenza and strep pneumo pneumonias. He is down to 30% and +8 PEEP but has failed extubation twice and plans are for a trach/PEG early this week. He also has acute renal failure and is dialysis dependent.Mental status has been improving and was able to participate in a conversation about trach yesterday but got two doses of Oxycodone and more somnolent today.        Assessment and plan :     Blanco Osborne IS a 57 year old male admitted on 11/12/2022 for acute respiratory failure.   I have personally reviewed the daily labs, imaging studies, cultures and discussed the case with referring physician and consulting physicians.     My assessment and plan by system for this patient is as follows:    Neurology/Psychiatry:   Possible anoxic encephalopathy.  Alhough awake and following commands, long term CNS prognosis is guarded. MRI  11/17 looks \"worse than what we have seen from him thus far\".  Much more somnolent today.  Received 10 mg of oxycodone overnight.  No specific reported pain  -Stop as needed opioids.  Asked nurse to report to physician if here she feels patient is " having pain and we will assess.    Cardiovascular:   1.  Status post PEA arrest 20 minutes of CPR.  Not a primary cardiac event.  Most likely secondary to respiratory failure. FRANKLIN was normal.  Was in shock which is now resolved.  Lipids and hemoglobin A1c were both within normal limits.  -     Pulmonary/Ventilator Management:   Acute respiratory failure and ARDS secondary to viral and bacterial pneumonia - he is down to 30% and +8 PEEP. He has difficulty ventilating which is likely a combination of acute on chronic illness, renal failure and volume overload, ALI, and generalized weakness causing inabiliyt to manage secretions. Long talk on 11/27 with Dr. Jones resulted in a decision to proceed with tracheostomy.  Also with pneumothorax status post chest tube.  No airleak seen today.  - Start pressure support for conditioning  - Tentative plan for trach although note some of the possible differing opinions  -Continue chest tube until patient has tracheostomy and can consistently and pressure support.    GI and Nutrition :   1.  Status post liver transplant.  On chronic tacrolimus.  Follows at the [facility] [place].  We have been touching base with them regularly.  2.  Tolerating enteral nutrition   -We will discuss resumption of tacrolimus with [facility] [place] doctors again later this week..  Currently on hydrocortisone    Renal/Fluids/Electrolytes:   1.  Acute renal failure now dialysis dependent.  No indication as of yet return of renal function.    Infectious Disease:   1.  Streptococcal pneumonia as well as parainfluenza as well as possible Aspergillus thought less likely by infectious disease consultant.  Also herpes labialis infection.  -Stopping antibiotics today per their recommendations.  Continuing acyclovir.    Endocrine:   1. Glucoses ok on coverage.  On stress dose steroids with hydrocortisone.  -Transition to tacrolimus after discussion with transplant.    Hematology/Oncology:   1. Hb ok 8.3     IV/Access:   1.  Venous access -   2. Arterial access -   3.  Plan  - central access required and necessary      ICU Prophylaxis:   1. DVT: Hep Subq/ LMWH/mechanical  2. VAP: HOB 30 degrees, chlorhexidine rinse  3. Stress Ulcer: PPI/H2 blocker  4. Restraints: Nonviolent soft two point restraints required and necessary for patient safety and continued cares and good effect as patient continues to pull at necessary lines, tubes despite education and distraction. Will readdress daily.   5. IV Access - central access required and necessary for continued patient cares  6. Feeding - enteral   7. Family Update: discussed with family at bedside. Brother agreeable to trach  8. Disposition - ICU care           Key goals for next 24 hours:   1. Supportive care today  2. Stop oxycodone  3. Tentative plan for tracheostomy           Key Medications:       acetylcysteine  2 mL Nebulization 4x Daily     acyclovir   Topical Q8H     artificial tears  1 drop Both Eyes TID     cefTRIAXone  2 g Intravenous Q24H     chlorhexidine  15 mL Mouth/Throat Q12H     heparin ANTICOAGULANT  5,000 Units Subcutaneous Q8H     heparin lock flush  5-20 mL Intracatheter Q24H     hydrocortisone sodium succinate PF  50 mg Intravenous Q6H     insulin aspart  1-12 Units Subcutaneous Q4H     insulin glargine  10 Units Subcutaneous QAM AC     ipratropium - albuterol 0.5 mg/2.5 mg/3 mL  3 mL Nebulization 4x daily     multivitamins w/minerals  15 mL Per Feeding Tube Daily     pantoprazole  40 mg Per Feeding Tube QAM AC    Or     pantoprazole  40 mg Intravenous QAM AC     polyethylene glycol  17 g Oral Daily     senna-docusate  1 tablet Oral BID    Or     senna-docusate  2 tablet Oral BID     sodium chloride (PF)  10-40 mL Intracatheter Q8H     sodium chloride (PF)  10-40 mL Intracatheter Q8H     sodium chloride (PF) 0.9%  10 mL Intracatheter Once in dialysis/CRRT     sodium chloride (PF) 0.9%  10 mL Intracatheter Once in dialysis/CRRT       dexmedetomidine Stopped (11/25/22  7887)     dextrose       CRRT replacement solution       fentaNYL Stopped (11/23/22 1616)     - MEDICATION INSTRUCTIONS -       - MEDICATION INSTRUCTIONS -       - MEDICATION INSTRUCTIONS -       norepinephrine       phenylephrine 0.2 mcg/kg/min (11/28/22 0449)     CRRT replacement solution 200 mL/hr at 11/26/22 0749     CRRT replacement solution       propofol Stopped (11/27/22 0800)     sodium chloride 10 mL/hr at 11/28/22 0450     sodium chloride 0 mL/hr at 11/22/22 0742     vasopressin                 Physical Examination:   Temp:  [96.8  F (36  C)-98.2  F (36.8  C)] 98  F (36.7  C)  Pulse:  [] 95  Resp:  [14-31] 16  BP: ()/(52-89) 103/63  FiO2 (%):  [30 %] 30 %  SpO2:  [93 %-100 %] 97 %       Intake/Output Summary (Last 24 hours) at 11/28/2022 1155  Last data filed at 11/28/2022 1000  Gross per 24 hour   Intake 1653.4 ml   Output 10 ml   Net 1643.4 ml   Net: +2.5 liters    Wt Readings from Last 4 Encounters:   11/28/22 93.2 kg (205 lb 7.5 oz)   10/07/19 137.5 kg (303 lb 3.2 oz)   10/04/19 131.5 kg (290 lb)   02/20/19 140.4 kg (309 lb 9.6 oz)     BP - Mean:  [61-96] 73  Vent Mode: CMV/AC  (Continuous Mandatory Ventilation/ Assist Control)  FiO2 (%): 30 %  Resp Rate (Set): 16 breaths/min  Tidal Volume (Set, mL): 450 mL  PEEP (cm H2O): 8 cmH2O  Resp: 16    Recent Labs   Lab 11/26/22  1343 11/26/22  1121 11/25/22  1947 11/23/22  1107 11/23/22  0442 11/22/22  1839 11/22/22  1545   PH  --   --   --  7.34* 7.47* 7.47* 7.49*   PCO2  --   --   --  43 32* 33* 32*   PO2  --   --   --  99 100 104 136*   HCO3  --   --   --  23 23 24 24   O2PER 70 60 45 30 40 40 40       GEN: no acute distress   HEENT: head ncat, sclera anicteric, OP patent, trachea midline, healing ulcers in nostrils and mouth   PULM: unlabored synchronous with vent, clear anteriorly, right sided chest tube bandages clean  CV/COR: RRR S1S2 no gallop,  No rub, no murmur  ABD: soft nontender,   EXT:  Mod edema, warm  NEURO: sedated, minimally  responsive  SKIN: no obvious rash; no cyanosis or mottling   LINES: clean, dry intact         Data:   All data and imaging reviewed     ROUTINE ICU LABS (Last four results)  CMP  Recent Labs   Lab 11/28/22  0508 11/28/22  0504 11/28/22  0011 11/27/22 2007 11/27/22  0921 11/27/22  0447 11/26/22  1524 11/26/22  1121 11/26/22  0411 11/26/22  0403 11/25/22  1953 11/25/22  1947 11/25/22  0812 11/25/22  0409   NA  --  140  --   --   --  139  --  137  --  138  --  136   < > 135   POTASSIUM  --  4.3  --   --   --  4.1  --  4.2  --  4.3  --  4.7   < > 4.8   CHLORIDE  --  105  --   --   --  107  --  106  --  105  --  104   < > 104   CO2  --  23  --   --   --  24  --  23  --  27  --  24   < > 26   ANIONGAP  --  12  --   --   --  8  --  8  --  6  --  8   < > 5   * 245* 228* 248*   < > 175*   < > 123*   < > 120*   < > 151*   < > 140*   BUN  --  83*  --   --   --  56*  --  37*  --  40*  --  48*   < > 58*   CR  --  2.86*  --   --   --  2.02*  --  1.29*  --  1.28*  --  1.43*   < > 1.84*   GFRESTIMATED  --  25*  --   --   --  38*  --  64  --  65  --  57*   < > 42*   KELLY  --  7.9*  --   --   --  7.7*  --  8.2*  --  8.7  --  9.1   < > 8.4*   MAG  --   --   --   --   --  2.1  --  1.9  --  1.9  --  2.1   < > 2.2   PHOS  --  3.7  --   --   --  4.3  --  4.5  --  4.5  --  4.9*   < > 5.6*   PROTTOTAL  --  5.1*  --   --   --  5.0*  --  5.7*  --  6.0*  --   --   --  5.6*   ALBUMIN  --  2.0*  --   --   --  2.0*  --  2.3*  --  2.1*  --  2.1*   < > 1.9*   BILITOTAL  --  1.0  --   --   --  1.1  --   --   --  1.6*  --   --   --  1.5*   ALKPHOS  --  250*  --   --   --  177*  --   --   --  210*  --   --   --  192*   AST  --  23  --   --   --  33  --   --   --  39  --   --   --  34   ALT  --  30  --   --   --  29  --   --   --  37  --   --   --  34    < > = values in this interval not displayed.     CBC  Recent Labs   Lab 11/28/22  0504 11/27/22  0447 11/26/22  1121 11/26/22  0403   WBC 13.9* 15.2* 17.7* 20.3*   RBC 2.47* 2.48* 2.82* 3.11*    HGB 8.3* 8.3* 9.5* 10.5*   HCT 26.0* 26.5* 31.0* 33.0*   * 107* 110* 106*   MCH 33.6* 33.5* 33.7* 33.8*   MCHC 31.9 31.3* 30.6* 31.8   RDW 19.2* 18.9* 18.8* 18.5*   PLT 76* 99* 129* 129*     INRNo lab results found in last 7 days.  Arterial Blood Gas  Recent Labs   Lab 11/26/22  1343 11/26/22  1121 11/25/22  1947 11/23/22  1107 11/23/22  0442 11/22/22  1839 11/22/22  1545   PH  --   --   --  7.34* 7.47* 7.47* 7.49*   PCO2  --   --   --  43 32* 33* 32*   PO2  --   --   --  99 100 104 136*   HCO3  --   --   --  23 23 24 24   O2PER 70 60 45 30 40 40 40       All cultures:  No results for input(s): CULT in the last 168 hours.  Recent Results (from the past 24 hour(s))   XR Chest Port 1 View    Narrative    EXAM: XR CHEST PORT 1 VIEW  LOCATION: [facility]  DATE/TIME: 11/26/2022 12:57 PM    INDICATION: Endotracheal tube placement.  COMPARISON: Chest radiograph performed earlier today.      Impression    IMPRESSION: An endotracheal tube has been placed, with tip 3 cm above the og. And enteric tube has been placed, with tip not seen, but below the diaphragm. No other significant interval change. Bilateral pleural effusions, moderate on the left and   small on the right, with associated infiltrate and/or atelectasis at both lung bases. Right IJ central venous catheter, right PICC line, and single right chest tube are unchanged in position. No pneumothorax.   XR Chest Port 1 View    Narrative    EXAM: XR CHEST PORT 1 VIEW  LOCATION: [facility]  DATE/TIME: 11/26/2022 1:59 PM    INDICATION: follow up hypoxemia  COMPARISON: 11/26/2022, 1252 hours      Impression    IMPRESSION: Endotracheal tube terminates 3.8 cm above the og. Right neck central venous catheter in the mid SVC and right upper extremity PICC at the SVC-RA junction. Enteric tube below the diaphragm, tip beyond the field-of-view. Unchanged right   chest tube.    Low lung volumes with layering  bilateral pleural effusions. Slightly improved aeration of the left lung compared to prior.   XR Chest Port 1 View    Narrative    EXAM: XR CHEST PORT 1 VIEW  LOCATION: [facility]  DATE/TIME: 11/27/2022 5:47 AM    INDICATION: follow up atelectasis  COMPARISON: 11/26/2022      Impression    IMPRESSION: Tubes and lines are in stable position. No significant change in bilateral pleural effusions and bibasilar consolidations.       Laura Fernandes MD    Billing: This patient is critically ill: Yes. Total critical care time today 85 min.

## 2022-11-28 NOTE — PROGRESS NOTES
[facility]    On 11/28 received referral from Laura BURNETT, Care Manager.  I reviewed the chart for potential admission to [facility] pending clinical readiness and bed availability.  Once the patient is off of sedation infusions and has trach/PEG and is able to demonstrate successful weaning trials and continues to tolerate weaning trials, we anticipate him to be clinically appropriate for [facility]. He will require prior authorization for LTACH admission from his [facility] [employer] insurance (I will do this).     Will continue to follow for appropriateness and bed availability.    Kalyani Villagomez, PT  Referral Specialist -- [facility]    [phone] (main admissions office)

## 2022-11-28 NOTE — PROGRESS NOTES
Renal Medicine Progress Note                                Blanco Osborne MRN# [id number]   Age: 57 year old YOB: 1965   Date of Admission: 11/12/2022 Hospital LOS: 16                  Assessment/Plan:     Blanco Osborne is a 57-year-old male with PMH CARRILLO s/p liver transplant (9/2016) and cirrhosis admitted 11/12/2022 with out of hospital PEA arrest and acute hypoxemic respiratory failure. Course complicated by parainfluenza virus, streptococcal bacteremia, and LORETTA requiring CRRT.      1.  Severe anuric LORETTA:                -CRRT stopped 11/22 and resume on 11/24 and stopped 11/26.      2.  Septic shock with MODS:                -no current pressors                  3.  Respiratory failure:    -multiple re intubations    -R Pneumothorax    -Bilateral pleural effusions   -Aspergillus PNA             4.  ESLD due to CARRILLO s/p liver transplant                - tacrolimus on hold               - manage per ICU     5.  Hypoalbuminemia        CRRT discontinued 11/26  No IHD since    Labs reviewed  30% FiO2  No pressors    No dialysis indicated today  Plan dialysis 11/29/22          Interval History:     Intubated  Narcotics received overnight  Lethargic    No urine output    Creatinine upward trend       ROS:     Intubated and sedated: ROS unable    Medications and Allergies:     Reviewed    Physical Exam:     Vitals were reviewed  Patient Vitals for the past 8 hrs:   BP Temp Temp src Pulse Resp SpO2 Weight   11/28/22 0900 92/59 97.2  F (36.2  C) Axillary 85 13 94 % --   11/28/22 0845 126/82 -- -- 100 24 96 % --   11/28/22 0830 95/58 -- -- 87 16 98 % --   11/28/22 0815 111/69 -- -- 98 18 98 % --   11/28/22 0800 136/82 -- -- 112 24 98 % --   11/28/22 0745 128/80 -- -- 105 23 98 % --   11/28/22 0730 124/74 -- -- 101 21 98 % --   11/28/22 0715 (!) 146/83 -- -- 110 21 98 % --   11/28/22 0700 96/58 -- -- 87 16 98 % --   11/28/22 0630 103/63 -- -- 95 16 97 % --   11/28/22 0615 125/77 -- -- 102 24 97 % --    11/28/22 0600 117/72 -- -- 102 23 97 % --   11/28/22 0545 117/70 -- -- 99 21 97 % --   11/28/22 0530 122/78 -- -- 104 26 96 % --   11/28/22 0515 122/76 -- -- 100 21 96 % --   11/28/22 0500 114/69 -- -- 90 20 97 % --   11/28/22 0445 106/65 -- -- 97 19 97 % --   11/28/22 0430 122/72 -- -- 101 21 97 % --   11/28/22 0415 123/76 -- -- 97 20 96 % --   11/28/22 0400 113/67 -- -- 95 19 97 % --   11/28/22 0345 112/63 -- -- 97 20 97 % --   11/28/22 0330 114/66 -- -- 95 18 97 % --   11/28/22 0315 110/72 -- -- 90 18 97 % --   11/28/22 0300 116/72 -- -- 92 22 98 % --   11/28/22 0245 111/84 -- -- 94 24 98 % --   11/28/22 0230 -- -- -- 81 16 99 % --   11/28/22 0215 98/56 -- -- 95 14 98 % --   11/28/22 0200 108/89 -- -- 96 19 97 % 93.2 kg (205 lb 7.5 oz)   11/28/22 0145 106/65 -- -- 85 16 97 % --     I/O last 3 completed shifts:  In: 1788.4 [I.V.:308.4; NG/GT:490]  Out: 20 [Chest Tube:20]    Vitals:    11/23/22 0200 11/24/22 0400 11/25/22 0600 11/27/22 0400   Weight: 97.2 kg (214 lb 4.6 oz) 99.1 kg (218 lb 7.6 oz) (P) 98.4 kg (216 lb 14.9 oz) 91 kg (200 lb 9.9 oz)    11/28/22 0200   Weight: 93.2 kg (205 lb 7.5 oz)         GENERAL:  intubated and sedated  HEENT: ETT  RESP:  clear anteriorly  CV: RRR, normal S1 S2  EXT: warm, no edema    Data:     Recent Labs   Lab 11/28/22  0907 11/28/22  0508 11/28/22  0504 11/28/22  0011 11/27/22  0921 11/27/22  0447 11/26/22  1524 11/26/22  1121 11/26/22  0411 11/26/22  0403   NA  --   --  140  --   --  139  --  137  --  138   POTASSIUM  --   --  4.3  --   --  4.1  --  4.2  --  4.3   CHLORIDE  --   --  105  --   --  107  --  106  --  105   CO2  --   --  23  --   --  24  --  23  --  27   ANIONGAP  --   --  12  --   --  8  --  8  --  6   * 169* 245* 228*   < > 175*   < > 123*   < > 120*   BUN  --   --  83*  --   --  56*  --  37*  --  40*   CR  --   --  2.86*  --   --  2.02*  --  1.29*  --  1.28*   GFRESTIMATED  --   --  25*  --   --  38*  --  64  --  65   KELLY  --   --  7.9*  --   --  7.7*   --  8.2*  --  8.7    < > = values in this interval not displayed.         Recent Labs   Lab Test 11/28/22  0504 11/27/22  0447 11/26/22  1121 11/26/22  0403 11/25/22  1947 11/25/22  1150 11/25/22  0409 11/24/22 2002 11/24/22  1325 11/24/22  0425   CR 2.86* 2.02* 1.29* 1.28* 1.43* 1.79* 1.84* 2.30* 2.64* 2.38*     Recent Labs   Lab 11/28/22  0504 11/27/22  0447 11/26/22  1121 11/26/22  0403   ALBUMIN 2.0* 2.0* 2.3* 2.1*     Recent Labs   Lab 11/28/22  0504 11/27/22  0447 11/26/22  1121 11/26/22  0403   PHOS 3.7 4.3 4.5 4.5   HGB 8.3* 8.3* 9.5* 10.5*         G Jose Reilly MD    [facility] - Nephrology  [phone]

## 2022-11-28 NOTE — PROGRESS NOTES
[facility] ICU RESPIRATORY NOTE        Date of Admission: 11/12/2022    Date of Intubation (most recent): 11/26/2022    Reason for Mechanical Ventilation: Respiratory Failure    Number of Days on Mechanical Ventilation: Day 3 since reintubation    Met Criteria for Spontaneous Breathing Trial: Yes.    Significant Events Today: Patient has been pressure supporting 10/8 30% since 0830 this morning. Patient placed on full support only for Volera treatments given in-line.     ABG Results:   Recent Labs   Lab 11/26/22  1343 11/26/22  1121 11/25/22  1947 11/23/22  1107 11/23/22  0442 11/22/22  1839 11/22/22  1545   PH  --   --   --  7.34* 7.47* 7.47* 7.49*   PCO2  --   --   --  43 32* 33* 32*   PO2  --   --   --  99 100 104 136*   HCO3  --   --   --  23 23 24 24   O2PER 70 60 45 30 40 40 40       Current Vent Settings: Vent Mode: CPAP/PS  (Continuous positive airway pressure with Pressure Support)  FiO2 (%): 30 %  Resp Rate (Set): 16 breaths/min  Tidal Volume (Set, mL): 450 mL  PEEP (cm H2O): 8 cmH2O  Pressure Support (cm H2O): 10 cmH2O  Resp: 15      Skin Assessment: Dried blood around nostrils and lips.    Plan: Continue to monitor patient on pressure support. Switch to full-support overnight.     Moises Martinez, RRT on 11/28/2022 at 5:37 PM

## 2022-11-28 NOTE — PROGRESS NOTES
[facility]    General Surgery  Chart Note    Per discussion with Dr. Read, patient is not a good surgical G tube candidate given extensive surgical history of liver transplant and current ascites. IR consult placed for feeding tube placement.      Alejandrina Zhu PA-C

## 2022-11-28 NOTE — PLAN OF CARE
SLP - Given continued intubation, will cancel current SLP orders.  If pt has trach placement, please re-consult SLP 48-72 hours post trach placement for SLP PMSV/speaking valve Eval and Tx as felt indicated.

## 2022-11-28 NOTE — PROGRESS NOTES
CLINICAL NUTRITION SERVICES - REASSESSMENT NOTE      Recommendations Ordered by Registered Dietitian (RD):   continue with TF and FWF as ordered (Novasource renal @ 55 mL/hr x20 hours, FWF of 30 mL q 4 hours)   Malnutrition:   % Weight Loss:  Weight loss does not meet criteria for malnutrition - intentional weight loss over past >1 year, per RD note 11/14  % Intake:  Decreased intake does not meet criteria for malnutrition - various interruptions to TF  Subcutaneous Fat Loss:  Orbital region mild depletion  Muscle Loss:  Temporal region mild depletion  Fluid Retention:  Trace to mild - generalized, bilateral wrist and hand, bilateral foot and ankle edema     Malnutrition Diagnosis: Patient does not meet two of the established criteria necessary for diagnosing malnutrition but is at risk       EVALUATION OF PROGRESS TOWARD GOALS   Diet: NPO    Nutrition Support: Enteral  Type of Feeding Tube: OG  Enteral Frequency:  Continuous  Enteral Regimen: Novasource Renal @ 55 mL/hr x20 hours (hold TF 1 hour prior/after BID for VFEND)  Total Enteral Provisions: 2160 kcal (25 kcal/kg), 98 g protein (1.1 g/kg and 98% estimated needs), 197 g CHO, 0 g fiber, 774 mL H2O, 1020 mg K, and 884 gm Phos   Free Water Flush: 30 mL q 4 hours     - 11/27: AXR = FT tip in stomach   - 11/26: TF re-started @ 55 mL/hr   - 11/24: OG removed d/t extubation   - 11/24: TF formula modified to Novasource renal (previously Promote @ 90 mL/hr x20 hours)  - 11/24: TF held d/t high gastric residual  - 11/23: TF reached new goal rate (90 mL/hr)    - per chart review, pt is tolerating TF well  - visited with pt's family this afternoon. Discussed continued nutrition POC. Will continue with TF as ordered. Family asked how much protein pt is receiving. RD discussed current TF formula and how much protein it is providing and explained higher protein needs r/t prior dialysis runs and preservation of muscle mass while in ICU.       ASSESSED NUTRITION  NEEDS:  Dosing Weight 86 kg (adjusted - using admit wt of 101 kg and IBW 81 kg)  Estimated Energy Needs: 5303-3060 kcals (20-25 Kcal/Kg)  Justification: overweight and vented   Estimated Protein Needs: 100-130 grams protein (1.2-1.5 g pro/Kg)  Justification: hypercatabolism with critical illness and preservation of lean body mass    NEW FINDINGS:   General:   - plan for dialysis run 11/29  - 11/28: first dialysis run today   - 11/26: CRRT discontinued   - 11/24: CRRT resumed  - 11/22: CRRT discontinued    Weight: variable wts over past week (.2 kg) --> suspect fluid-related shifts vs bedscale variability     Labs: BUN 83 (H), creatinine 2.86 (H), alk phos 250 (H), -248    Medications: insulin aspart (HSSI), insulin lantus (10 units), multivitamins w minerals liquid, protonix, miralax, senna-docusate, phenylephrine @ 1.5 mL/hr, IVF @ 10 mL/hr   - 11/27: propofol off  - currently off pressors    GI/Nutrition:  - 11/28, per surgery note: patient is not a good surgical G tube candidate given extensive surgical history of liver transplant and current ascites. IR consult placed  - 11/26: thoracentesis done --> 1.5 L removed for pleural effusion     Skin: Bruises- scattered and excoriated left midline chest. scabbed nose and lips     Resp.:  - currently intubated  - 11/28: continued discussion concerting possible trach placement with pt's family  - 11/27: re-intubated   - 11/24: exubated  - 11/21: intubated      Previous Goals:   TF + Propofol will meet % needs   Evaluation: Not met    Previous Nutrition Diagnosis:   No nutrition diagnosis identified at this time  Evaluation: No change      MALNUTRITION  % Weight Loss:  Weight loss does not meet criteria for malnutrition - intentional weight loss over past >1 year, per RD note 11/14  % Intake:  Decreased intake does not meet criteria for malnutrition - various interruptions to TF  Subcutaneous Fat Loss:  Orbital region mild depletion  Muscle Loss:   Temporal region mild depletion  Fluid Retention:  Trace to mild - generalized, bilateral wrist and hand, bilateral foot and ankle edema     Malnutrition Diagnosis: Patient does not meet two of the established criteria necessary for diagnosing malnutrition but is at risk      CURRENT NUTRITION DIAGNOSIS  No nutrition diagnosis identified at this time    INTERVENTIONS  Recommendations / Nutrition Prescription  continue with TF and FWF as ordered (Novasource renal @ 55 mL/hr x20 hours, FWF of 30 mL q 4 hours)      Implementation  EN Composition, EN Schedule and Feeding Tube Flush  Nutrition education    Goals  TF to meet % estimated needs over the next 3-7 days.      MONITORING AND EVALUATION:  Progress towards goals will be monitored and evaluated per protocol and Practice Guidelines      Cady Epstein RD, LD

## 2022-11-28 NOTE — CONSULTS
Patient is on IR schedule Tuesday 11/29/22 for a Gastrostomy tube placement, possible paracentesis at 8 am.     Order in to hold 11/29  6 am subcutaneous Heparin     -Labs WNL for procedure.    -Orders for NPO have been entered.   -Consent will be done prior to procedure.   -Phone consent was obtained from spouse Ofe after explaining the procedure, risks and benefits and consent is in IR.     [place] the IR [place] [phone] for procedural related questions.     Thanks, Nina [facility] CNP ([phone]) (phone [phone])

## 2022-11-28 NOTE — PROGRESS NOTES
Thoracic Surgery:    Intubated, sedated    Fi02: 30%  PEEP: 8    Heparin TID    Right chest tube in place    Neck: palpable, no previous incisions, midline. Skin excoriation left anterior chest near neck    Called and discussed risks and benefits of tracheostomy with Ofe, patient's wife. Also discussed anticipated post-op course. May need to hold heparin a few doses-- will d/w . She wishes to proceed. Not on OR schedule yet.  Telephone consent obtained and placed in front of patient chart.    Paola Hoang PA-C with Dr. Nilesh Jackson  [facility]  Cell [phone]    Addendum:  Called and spoke to brother Dylan, who also is authorized to give consent on Blanco's behalf. He voiced preference for further discussion with both Blanco if he can be weaned off narcotics in order to give his own opinion on trach, as well as another conversation with Intensivist who is on today. Dylan uncertain he is in favor of proceeding with trach. I discussed risks and benefits and described surgical procedure, Questions answered to the best of my ability. Dylan will plan to come to ICU soon.    Paola Hoang PA-C with Dr. Nilesh Jackson  [facility]  Cell [phone]

## 2022-11-28 NOTE — PLAN OF CARE
Pt remains vented, lethargic all day, not following commands but opening eyes to voice and moving bilat UEs this afternoon at times.  Right CT remains in place; no output this shift. Low-dose mayelin gtt. 2 small soft BMs.  TF @ goal.  Planning for feeding tube vs Gtube in IR @ 0800; NPO after MN.  Consent signed for trach; waiting on time for tomorrow. CPAP most of day, 30% 10/8.  Plan to go back on full vent settings over night. Family @ bedside, updated by RN & Dr. Fernandes.      *Pt had emesis w/coughing jag @ 1830; TF residual 250. Updated Dr. Laurent; wants to just turn off TF now; due to be NPO @ MN anyway.     Goal Outcome Evaluation:  No change

## 2022-11-29 ENCOUNTER — APPOINTMENT (OUTPATIENT)
Dept: INTERVENTIONAL RADIOLOGY/VASCULAR | Facility: CLINIC | Age: 57
DRG: 004 | End: 2022-11-29
Attending: NURSE PRACTITIONER
Payer: COMMERCIAL

## 2022-11-29 ENCOUNTER — APPOINTMENT (OUTPATIENT)
Dept: INTERVENTIONAL RADIOLOGY/VASCULAR | Facility: CLINIC | Age: 57
DRG: 004 | End: 2022-11-29
Attending: PHYSICIAN ASSISTANT
Payer: COMMERCIAL

## 2022-11-29 ENCOUNTER — ANESTHESIA EVENT (OUTPATIENT)
Dept: SURGERY | Facility: CLINIC | Age: 57
DRG: 004 | End: 2022-11-29
Payer: COMMERCIAL

## 2022-11-29 ENCOUNTER — ANESTHESIA (OUTPATIENT)
Dept: SURGERY | Facility: CLINIC | Age: 57
DRG: 004 | End: 2022-11-29
Payer: COMMERCIAL

## 2022-11-29 ENCOUNTER — APPOINTMENT (OUTPATIENT)
Dept: ULTRASOUND IMAGING | Facility: CLINIC | Age: 57
DRG: 004 | End: 2022-11-29
Attending: INTERNAL MEDICINE
Payer: COMMERCIAL

## 2022-11-29 PROBLEM — E72.20 HYPERAMMONEMIA (H): Status: ACTIVE | Noted: 2022-11-29

## 2022-11-29 PROBLEM — J93.9 PNEUMOTHORAX: Status: ACTIVE | Noted: 2022-11-29

## 2022-11-29 LAB
ALBUMIN SERPL-MCNC: 1.9 G/DL (ref 3.4–5)
ALP SERPL-CCNC: 189 U/L (ref 40–150)
ALT SERPL W P-5'-P-CCNC: 24 U/L (ref 0–70)
AMMONIA PLAS-SCNC: 123 UMOL/L (ref 10–50)
ANION GAP SERPL CALCULATED.3IONS-SCNC: 9 MMOL/L (ref 3–14)
AST SERPL W P-5'-P-CCNC: 19 U/L (ref 0–45)
ATRIAL RATE - MUSE: 65 BPM
BILIRUB DIRECT SERPL-MCNC: 0.7 MG/DL (ref 0–0.2)
BILIRUB SERPL-MCNC: 0.9 MG/DL (ref 0.2–1.3)
BUN SERPL-MCNC: 100 MG/DL (ref 7–30)
CALCIUM SERPL-MCNC: 7.9 MG/DL (ref 8.5–10.1)
CHLORIDE BLD-SCNC: 105 MMOL/L (ref 94–109)
CO2 SERPL-SCNC: 24 MMOL/L (ref 20–32)
CREAT SERPL-MCNC: 3.74 MG/DL (ref 0.66–1.25)
DIASTOLIC BLOOD PRESSURE - MUSE: NORMAL MMHG
ERYTHROCYTE [DISTWIDTH] IN BLOOD BY AUTOMATED COUNT: 19 % (ref 10–15)
GFR SERPL CREATININE-BSD FRML MDRD: 18 ML/MIN/1.73M2
GLUCOSE BLD-MCNC: 149 MG/DL (ref 70–99)
GLUCOSE BLDC GLUCOMTR-MCNC: 122 MG/DL (ref 70–99)
GLUCOSE BLDC GLUCOMTR-MCNC: 128 MG/DL (ref 70–99)
GLUCOSE BLDC GLUCOMTR-MCNC: 138 MG/DL (ref 70–99)
GLUCOSE BLDC GLUCOMTR-MCNC: 149 MG/DL (ref 70–99)
GLUCOSE BLDC GLUCOMTR-MCNC: 158 MG/DL (ref 70–99)
GLUCOSE BLDC GLUCOMTR-MCNC: 168 MG/DL (ref 70–99)
HCT VFR BLD AUTO: 22.3 % (ref 40–53)
HGB BLD-MCNC: 7.4 G/DL (ref 13.3–17.7)
INR PPP: 1.26 (ref 0.85–1.15)
INTERPRETATION ECG - MUSE: NORMAL
MAGNESIUM SERPL-MCNC: 2.3 MG/DL (ref 1.6–2.3)
MCH RBC QN AUTO: 33.8 PG (ref 26.5–33)
MCHC RBC AUTO-ENTMCNC: 33.2 G/DL (ref 31.5–36.5)
MCV RBC AUTO: 102 FL (ref 78–100)
P AXIS - MUSE: 10 DEGREES
PHOSPHATE SERPL-MCNC: 4.2 MG/DL (ref 2.5–4.5)
PLATELET # BLD AUTO: 57 10E3/UL (ref 150–450)
POTASSIUM BLD-SCNC: 4.4 MMOL/L (ref 3.4–5.3)
PR INTERVAL - MUSE: 108 MS
PROT SERPL-MCNC: 4.9 G/DL (ref 6.8–8.8)
QRS DURATION - MUSE: 94 MS
QT - MUSE: 408 MS
QTC - MUSE: 424 MS
R AXIS - MUSE: 31 DEGREES
RBC # BLD AUTO: 2.19 10E6/UL (ref 4.4–5.9)
SODIUM SERPL-SCNC: 138 MMOL/L (ref 133–144)
SYSTOLIC BLOOD PRESSURE - MUSE: NORMAL MMHG
T AXIS - MUSE: 71 DEGREES
TACROLIMUS BLD-MCNC: 1 UG/L (ref 5–15)
TME LAST DOSE: ABNORMAL H
TME LAST DOSE: ABNORMAL H
VENTRICULAR RATE- MUSE: 65 BPM
WBC # BLD AUTO: 8.9 10E3/UL (ref 4–11)

## 2022-11-29 PROCEDURE — 250N000013 HC RX MED GY IP 250 OP 250 PS 637: Performed by: INTERNAL MEDICINE

## 2022-11-29 PROCEDURE — 250N000013 HC RX MED GY IP 250 OP 250 PS 637: Performed by: PHYSICIAN ASSISTANT

## 2022-11-29 PROCEDURE — 94640 AIRWAY INHALATION TREATMENT: CPT | Mod: 76

## 2022-11-29 PROCEDURE — C9113 INJ PANTOPRAZOLE SODIUM, VIA: HCPCS | Performed by: STUDENT IN AN ORGANIZED HEALTH CARE EDUCATION/TRAINING PROGRAM

## 2022-11-29 PROCEDURE — 84100 ASSAY OF PHOSPHORUS: CPT | Performed by: STUDENT IN AN ORGANIZED HEALTH CARE EDUCATION/TRAINING PROGRAM

## 2022-11-29 PROCEDURE — 250N000011 HC RX IP 250 OP 636: Performed by: NURSE PRACTITIONER

## 2022-11-29 PROCEDURE — 49440 PLACE GASTROSTOMY TUBE PERC: CPT

## 2022-11-29 PROCEDURE — 250N000009 HC RX 250: Performed by: PHYSICIAN ASSISTANT

## 2022-11-29 PROCEDURE — 272N000001 HC OR GENERAL SUPPLY STERILE: Performed by: THORACIC SURGERY (CARDIOTHORACIC VASCULAR SURGERY)

## 2022-11-29 PROCEDURE — 80053 COMPREHEN METABOLIC PANEL: CPT | Performed by: STUDENT IN AN ORGANIZED HEALTH CARE EDUCATION/TRAINING PROGRAM

## 2022-11-29 PROCEDURE — 0DH63UZ INSERTION OF FEEDING DEVICE INTO STOMACH, PERCUTANEOUS APPROACH: ICD-10-PCS | Performed by: RADIOLOGY

## 2022-11-29 PROCEDURE — 272N000571 HC SHEATH CR8

## 2022-11-29 PROCEDURE — 94640 AIRWAY INHALATION TREATMENT: CPT

## 2022-11-29 PROCEDURE — 370N000017 HC ANESTHESIA TECHNICAL FEE, PER MIN: Performed by: THORACIC SURGERY (CARDIOTHORACIC VASCULAR SURGERY)

## 2022-11-29 PROCEDURE — 80197 ASSAY OF TACROLIMUS: CPT | Performed by: STUDENT IN AN ORGANIZED HEALTH CARE EDUCATION/TRAINING PROGRAM

## 2022-11-29 PROCEDURE — 250N000011 HC RX IP 250 OP 636: Performed by: INTERNAL MEDICINE

## 2022-11-29 PROCEDURE — 250N000009 HC RX 250: Performed by: NURSE PRACTITIONER

## 2022-11-29 PROCEDURE — 49083 ABD PARACENTESIS W/IMAGING: CPT

## 2022-11-29 PROCEDURE — 360N000075 HC SURGERY LEVEL 2, PER MIN: Performed by: THORACIC SURGERY (CARDIOTHORACIC VASCULAR SURGERY)

## 2022-11-29 PROCEDURE — 250N000011 HC RX IP 250 OP 636: Performed by: STUDENT IN AN ORGANIZED HEALTH CARE EDUCATION/TRAINING PROGRAM

## 2022-11-29 PROCEDURE — 83735 ASSAY OF MAGNESIUM: CPT | Performed by: STUDENT IN AN ORGANIZED HEALTH CARE EDUCATION/TRAINING PROGRAM

## 2022-11-29 PROCEDURE — 250N000009 HC RX 250: Performed by: INTERNAL MEDICINE

## 2022-11-29 PROCEDURE — 82248 BILIRUBIN DIRECT: CPT | Performed by: INTERNAL MEDICINE

## 2022-11-29 PROCEDURE — 250N000025 HC SEVOFLURANE, PER MIN: Performed by: THORACIC SURGERY (CARDIOTHORACIC VASCULAR SURGERY)

## 2022-11-29 PROCEDURE — 250N000011 HC RX IP 250 OP 636: Performed by: ANESTHESIOLOGY

## 2022-11-29 PROCEDURE — 250N000013 HC RX MED GY IP 250 OP 250 PS 637: Performed by: STUDENT IN AN ORGANIZED HEALTH CARE EDUCATION/TRAINING PROGRAM

## 2022-11-29 PROCEDURE — 90937 HEMODIALYSIS REPEATED EVAL: CPT

## 2022-11-29 PROCEDURE — 258N000003 HC RX IP 258 OP 636: Performed by: ANESTHESIOLOGY

## 2022-11-29 PROCEDURE — 250N000011 HC RX IP 250 OP 636: Performed by: PHYSICIAN ASSISTANT

## 2022-11-29 PROCEDURE — 99291 CRITICAL CARE FIRST HOUR: CPT | Performed by: INTERNAL MEDICINE

## 2022-11-29 PROCEDURE — 999N000157 HC STATISTIC RCP TIME EA 10 MIN

## 2022-11-29 PROCEDURE — 93975 VASCULAR STUDY: CPT

## 2022-11-29 PROCEDURE — G0463 HOSPITAL OUTPT CLINIC VISIT: HCPCS

## 2022-11-29 PROCEDURE — 85610 PROTHROMBIN TIME: CPT | Performed by: INTERNAL MEDICINE

## 2022-11-29 PROCEDURE — 250N000011 HC RX IP 250 OP 636: Performed by: RADIOLOGY

## 2022-11-29 PROCEDURE — 82140 ASSAY OF AMMONIA: CPT | Performed by: INTERNAL MEDICINE

## 2022-11-29 PROCEDURE — 85014 HEMATOCRIT: CPT | Performed by: STUDENT IN AN ORGANIZED HEALTH CARE EDUCATION/TRAINING PROGRAM

## 2022-11-29 PROCEDURE — 200N000001 HC R&B ICU

## 2022-11-29 PROCEDURE — 250N000009 HC RX 250: Performed by: ANESTHESIOLOGY

## 2022-11-29 PROCEDURE — 94668 MNPJ CHEST WALL SBSQ: CPT

## 2022-11-29 PROCEDURE — 94003 VENT MGMT INPAT SUBQ DAY: CPT

## 2022-11-29 PROCEDURE — 0B110F4 BYPASS TRACHEA TO CUTANEOUS WITH TRACHEOSTOMY DEVICE, OPEN APPROACH: ICD-10-PCS | Performed by: THORACIC SURGERY (CARDIOTHORACIC VASCULAR SURGERY)

## 2022-11-29 PROCEDURE — 99231 SBSQ HOSP IP/OBS SF/LOW 25: CPT | Performed by: INTERNAL MEDICINE

## 2022-11-29 RX ORDER — NALOXONE HYDROCHLORIDE 0.4 MG/ML
0.2 INJECTION, SOLUTION INTRAMUSCULAR; INTRAVENOUS; SUBCUTANEOUS
Status: DISCONTINUED | OUTPATIENT
Start: 2022-11-29 | End: 2022-11-29 | Stop reason: HOSPADM

## 2022-11-29 RX ORDER — FLUMAZENIL 0.1 MG/ML
0.2 INJECTION, SOLUTION INTRAVENOUS
Status: DISCONTINUED | OUTPATIENT
Start: 2022-11-29 | End: 2022-11-29 | Stop reason: HOSPADM

## 2022-11-29 RX ORDER — FENTANYL CITRATE 50 UG/ML
25-50 INJECTION, SOLUTION INTRAMUSCULAR; INTRAVENOUS EVERY 5 MIN PRN
Status: DISCONTINUED | OUTPATIENT
Start: 2022-11-29 | End: 2022-11-29 | Stop reason: HOSPADM

## 2022-11-29 RX ORDER — FENTANYL CITRATE 50 UG/ML
INJECTION, SOLUTION INTRAMUSCULAR; INTRAVENOUS PRN
Status: DISCONTINUED | OUTPATIENT
Start: 2022-11-29 | End: 2022-11-29

## 2022-11-29 RX ORDER — LACTULOSE 10 G/15ML
10 SOLUTION ORAL 3 TIMES DAILY
Status: DISCONTINUED | OUTPATIENT
Start: 2022-11-29 | End: 2022-11-29

## 2022-11-29 RX ORDER — NALOXONE HYDROCHLORIDE 0.4 MG/ML
0.4 INJECTION, SOLUTION INTRAMUSCULAR; INTRAVENOUS; SUBCUTANEOUS
Status: DISCONTINUED | OUTPATIENT
Start: 2022-11-29 | End: 2022-11-29 | Stop reason: HOSPADM

## 2022-11-29 RX ORDER — LACTULOSE 10 G/15ML
10 SOLUTION ORAL 3 TIMES DAILY
Status: DISCONTINUED | OUTPATIENT
Start: 2022-11-29 | End: 2022-12-05

## 2022-11-29 RX ORDER — SODIUM CHLORIDE, SODIUM LACTATE, POTASSIUM CHLORIDE, CALCIUM CHLORIDE 600; 310; 30; 20 MG/100ML; MG/100ML; MG/100ML; MG/100ML
INJECTION, SOLUTION INTRAVENOUS CONTINUOUS PRN
Status: DISCONTINUED | OUTPATIENT
Start: 2022-11-29 | End: 2022-11-29

## 2022-11-29 RX ADMIN — PANTOPRAZOLE SODIUM 40 MG: 40 INJECTION, POWDER, FOR SOLUTION INTRAVENOUS at 08:15

## 2022-11-29 RX ADMIN — ROCURONIUM BROMIDE 50 MG: 50 INJECTION, SOLUTION INTRAVENOUS at 16:06

## 2022-11-29 RX ADMIN — INSULIN GLARGINE 10 UNITS: 100 INJECTION, SOLUTION SUBCUTANEOUS at 11:57

## 2022-11-29 RX ADMIN — FENTANYL CITRATE 50 MCG: 50 INJECTION INTRAMUSCULAR; INTRAVENOUS at 09:11

## 2022-11-29 RX ADMIN — LACTULOSE 10 G: 20 SOLUTION ORAL at 17:05

## 2022-11-29 RX ADMIN — HYDROCORTISONE SODIUM SUCCINATE 50 MG: 100 INJECTION, POWDER, FOR SOLUTION INTRAMUSCULAR; INTRAVENOUS at 11:42

## 2022-11-29 RX ADMIN — Medication 1 DROP: at 21:49

## 2022-11-29 RX ADMIN — NALOXONE HYDROCHLORIDE 0.2 MG: 0.4 INJECTION, SOLUTION INTRAMUSCULAR; INTRAVENOUS; SUBCUTANEOUS at 11:43

## 2022-11-29 RX ADMIN — HYDROCORTISONE SODIUM SUCCINATE 50 MG: 100 INJECTION, POWDER, FOR SOLUTION INTRAMUSCULAR; INTRAVENOUS at 18:07

## 2022-11-29 RX ADMIN — HYDROCORTISONE SODIUM SUCCINATE 50 MG: 100 INJECTION, POWDER, FOR SOLUTION INTRAMUSCULAR; INTRAVENOUS at 06:34

## 2022-11-29 RX ADMIN — MIDAZOLAM 1 MG: 1 INJECTION INTRAMUSCULAR; INTRAVENOUS at 08:49

## 2022-11-29 RX ADMIN — IPRATROPIUM BROMIDE AND ALBUTEROL SULFATE 3 ML: .5; 3 SOLUTION RESPIRATORY (INHALATION) at 18:57

## 2022-11-29 RX ADMIN — CHLORHEXIDINE GLUCONATE 0.12% ORAL RINSE 15 ML: 1.2 LIQUID ORAL at 20:32

## 2022-11-29 RX ADMIN — ACYCLOVIR: 50 OINTMENT TOPICAL at 13:55

## 2022-11-29 RX ADMIN — SODIUM CHLORIDE, PRESERVATIVE FREE 10 ML: 5 INJECTION INTRAVENOUS at 17:35

## 2022-11-29 RX ADMIN — INSULIN ASPART 1 UNITS: 100 INJECTION, SOLUTION INTRAVENOUS; SUBCUTANEOUS at 07:38

## 2022-11-29 RX ADMIN — MIDAZOLAM 1 MG: 1 INJECTION INTRAMUSCULAR; INTRAVENOUS at 09:11

## 2022-11-29 RX ADMIN — Medication 1 DROP: at 17:05

## 2022-11-29 RX ADMIN — FENTANYL CITRATE 50 MCG: 50 INJECTION, SOLUTION INTRAMUSCULAR; INTRAVENOUS at 16:12

## 2022-11-29 RX ADMIN — SODIUM CHLORIDE, POTASSIUM CHLORIDE, SODIUM LACTATE AND CALCIUM CHLORIDE: 600; 310; 30; 20 INJECTION, SOLUTION INTRAVENOUS at 16:01

## 2022-11-29 RX ADMIN — ACYCLOVIR: 50 OINTMENT TOPICAL at 06:34

## 2022-11-29 RX ADMIN — INSULIN ASPART 1 UNITS: 100 INJECTION, SOLUTION INTRAVENOUS; SUBCUTANEOUS at 04:25

## 2022-11-29 RX ADMIN — GLUCAGON HYDROCHLORIDE 1 MG: KIT at 09:10

## 2022-11-29 RX ADMIN — IPRATROPIUM BROMIDE AND ALBUTEROL SULFATE 3 ML: .5; 3 SOLUTION RESPIRATORY (INHALATION) at 06:59

## 2022-11-29 RX ADMIN — LACTULOSE 10 G: 20 SOLUTION ORAL at 21:49

## 2022-11-29 RX ADMIN — INSULIN ASPART 2 UNITS: 100 INJECTION, SOLUTION INTRAVENOUS; SUBCUTANEOUS at 00:17

## 2022-11-29 RX ADMIN — ACETYLCYSTEINE 2 ML: 200 SOLUTION ORAL; RESPIRATORY (INHALATION) at 11:36

## 2022-11-29 RX ADMIN — ACETYLCYSTEINE 2 ML: 200 SOLUTION ORAL; RESPIRATORY (INHALATION) at 07:00

## 2022-11-29 RX ADMIN — HEPARIN SODIUM 5000 UNITS: 5000 INJECTION, SOLUTION INTRAVENOUS; SUBCUTANEOUS at 21:49

## 2022-11-29 RX ADMIN — Medication 1 DROP: at 08:15

## 2022-11-29 RX ADMIN — ACYCLOVIR: 50 OINTMENT TOPICAL at 21:50

## 2022-11-29 RX ADMIN — IPRATROPIUM BROMIDE AND ALBUTEROL SULFATE 3 ML: .5; 3 SOLUTION RESPIRATORY (INHALATION) at 11:36

## 2022-11-29 RX ADMIN — HYDROCORTISONE SODIUM SUCCINATE 50 MG: 100 INJECTION, POWDER, FOR SOLUTION INTRAMUSCULAR; INTRAVENOUS at 00:17

## 2022-11-29 RX ADMIN — FENTANYL CITRATE 50 MCG: 50 INJECTION INTRAMUSCULAR; INTRAVENOUS at 08:49

## 2022-11-29 RX ADMIN — HEPARIN SODIUM 5000 UNITS: 5000 INJECTION, SOLUTION INTRAVENOUS; SUBCUTANEOUS at 13:55

## 2022-11-29 RX ADMIN — CHLORHEXIDINE GLUCONATE 0.12% ORAL RINSE 15 ML: 1.2 LIQUID ORAL at 08:15

## 2022-11-29 RX ADMIN — LIDOCAINE HYDROCHLORIDE 13 ML: 10 INJECTION, SOLUTION INFILTRATION; PERINEURAL at 09:01

## 2022-11-29 RX ADMIN — FENTANYL CITRATE 50 MCG: 50 INJECTION, SOLUTION INTRAMUSCULAR; INTRAVENOUS at 16:01

## 2022-11-29 ASSESSMENT — ACTIVITIES OF DAILY LIVING (ADL)
ADLS_ACUITY_SCORE: 46
ADLS_ACUITY_SCORE: 47
ADLS_ACUITY_SCORE: 53
ADLS_ACUITY_SCORE: 53
ADLS_ACUITY_SCORE: 47
ADLS_ACUITY_SCORE: 46
ADLS_ACUITY_SCORE: 47

## 2022-11-29 ASSESSMENT — COPD QUESTIONNAIRES: COPD: 1

## 2022-11-29 ASSESSMENT — LIFESTYLE VARIABLES: TOBACCO_USE: 0

## 2022-11-29 NOTE — PROGRESS NOTES
[facility]    Infectious Disease Progress Note    Date of Service : 11/29/2022     Assessment:  57 YM with hx of liver transplantation who is hopsitalzied with out of hospital PEA cardiac arrest with 20 min CPR and found to have pneumococcal pneumonia with ARDS and sepsis superimposed on  Parainfluenza pneumonia. He was in septic shock with multiorgan failure with acute hypoxic respiratory failure requiring mechanical ventilation, LORETTA requiring CRRT  Course was  complicated by barotrauma with right pneumothorax requiring chest tube . 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 On Tacrolimus  -LORETTA on CRRT  -Thrombocytopenia related to sepsis improving  -anemia    Recommendations  1. Completed 2 week treatment course for pneumococcal pneumonia with bacteremia on 11/27. Stopped now.     ID will follow peripherally, please call back if any change in status, new micro or ques.       Discussed with the critical care team  Gurwinder Lanza MD    Interval History   Remains intubated . Remains afebrile with improving wBC    Physical Exam   Temp: 98.7  F (37.1  C) Temp src: Axillary BP: 114/74 Pulse: 70   Resp: 18 SpO2: 98 % O2 Device: Mechanical Ventilator    Vitals:    11/27/22 0400 11/28/22 0200 11/29/22 0000   Weight: 91 kg (200 lb 9.9 oz) 93.2 kg (205 lb 7.5 oz) 95.7 kg (210 lb 15.7 oz)     Vital Signs with Ranges  Temp:  [98.4  F (36.9   C)-98.7  F (37.1  C)] 98.7  F (37.1  C)  Pulse:  [63-98] 70  Resp:  [12-34] 18  BP: ()/(45-81) 114/74  FiO2 (%):  [30 %] 30 %  SpO2:  [95 %-100 %] 98 %      Other:    Medications     dexmedetomidine Stopped (11/25/22 1827)     dextrose       CRRT replacement solution       fentaNYL Stopped (11/23/22 1616)     - MEDICATION INSTRUCTIONS -       - MEDICATION INSTRUCTIONS -       - MEDICATION INSTRUCTIONS -       norepinephrine       phenylephrine Stopped (11/29/22 0500)     CRRT replacement solution 200 mL/hr at 11/26/22 0749     CRRT replacement solution       sodium chloride 10 mL/hr at 11/28/22 0450     sodium chloride 0 mL/hr at 11/22/22 0742     vasopressin         sodium chloride 0.9%  250 mL Intravenous Once in dialysis/CRRT     sodium chloride 0.9%  300 mL Hemodialysis Machine Once     acetylcysteine  2 mL Nebulization 4x Daily     acyclovir   Topical Q8H     artificial tears  1 drop Both Eyes TID     chlorhexidine  15 mL Mouth/Throat Q12H     heparin ANTICOAGULANT  5,000 Units Subcutaneous Q8H     heparin lock flush  5-20 mL Intracatheter Q24H     hydrocortisone sodium succinate PF  50 mg Intravenous Q6H     insulin aspart  1-12 Units Subcutaneous Q4H     insulin glargine  10 Units Subcutaneous QAM AC     ipratropium - albuterol 0.5 mg/2.5 mg/3 mL  3 mL Nebulization 4x daily     multivitamins w/minerals  15 mL Per Feeding Tube Daily     - MEDICATION INSTRUCTIONS -   Does not apply Once     pantoprazole  40 mg Per Feeding Tube QAM AC    Or     pantoprazole  40 mg Intravenous QAM AC     polyethylene glycol  17 g Oral Daily     senna-docusate  1 tablet Oral BID    Or     senna-docusate  2 tablet Oral BID     sodium chloride (PF)  10-40 mL Intracatheter Q8H     sodium chloride (PF)  10-40 mL Intracatheter Q8H     sodium chloride (PF) 0.9%  10 mL Intracatheter Once in dialysis/CRRT     sodium chloride (PF) 0.9%  10 mL Intracatheter Once in dialysis/CRRT       Data   All microbiology laboratory data  reviewed.  Recent Labs   Lab Test 11/29/22 0439 11/28/22  0504 11/27/22  0447   WBC 8.9 13.9* 15.2*   HGB 7.4* 8.3* 8.3*   HCT 22.3* 26.0* 26.5*   * 105* 107*   PLT 57* 76* 99*     Recent Labs   Lab Test 11/29/22 0439 11/28/22  0504 11/27/22 0447   CR 3.74* 2.86* 2.02*

## 2022-11-29 NOTE — ANESTHESIA PREPROCEDURE EVALUATION
Anesthesia Pre-Procedure Evaluation    Patient: Blanco Osborne   MRN: [id number] : 1965        Procedure : Procedure(s):  TRACHEOSTOMY          Past Medical History:   Diagnosis Date     Acquired immunocompromised state (H) 2022     Ascites      Aspergillus pneumonia (H) 2022     Cirrhosis of liver with ascites (H) 2016     H/O alcohol abuse      HTN (hypertension)      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     NAFLD (nonalcoholic fatty liver disease) 2016     CHRISTIAN on CPAP      Parainfluenza type 1 infection 2022     SBP (spontaneous bacterial peritonitis) (H)     [facility]     Sepsis due to Streptococcus pneumoniae with acute hypoxic respiratory failure (H) 2022     Tubular adenoma 10/2019    Large cecal adenoma- due for surveillance colonoscopy in 3 years (10/2022)      Past Surgical History:   Procedure Laterality Date     APPENDECTOMY       BENCH LIVER N/A 2016    Procedure: BENCH LIVER;  Surgeon: Enoc Crews MD;  Location: [facility] OR     COLONOSCOPY  13    repeat in 2018     COLONOSCOPY N/A 10/4/2019    Procedure: COLONOSCOPY, WITH POLYPECTOMY AND BIOPSY;  Surgeon: Go Chong MD;  Location: [facility] OR     HERNIA REPAIR       IR GASTROSTOMY TUBE PERCUTANEOUS PLCMNT  2022     IR PARACENTESIS  2022     TRANSPLANT LIVER RECIPIENT  DONOR N/A 2016    Procedure: TRANSPLANT LIVER RECIPIENT  DONOR;  Surgeon: Enoc Crews MD;  Location: [facility] OR      No Active Allergies   Social History     Tobacco Use     Smoking status: Never     Smokeless tobacco: Never   Substance Use Topics     Alcohol use: Not Currently     Alcohol/week: 0.0 standard drinks      Wt Readings from Last 1 Encounters:   22 95.7 kg (210 lb 15.7 oz)        Anesthesia Evaluation   Pt has had prior anesthetic. Type: General.    No history of anesthetic complications       ROS/MED HX  ENT/Pulmonary: Comment: Encephalopathy  Intubated and  G-tube placed    (+) sleep apnea, COPD,  (-) tobacco use and asthma   Neurologic: Comment: encephalopathy    (+) CVA,     Cardiovascular: Comment: PEA arrest 2 weeks ago  Cardiac/resp failure    (+) hypertension-----Previous cardiac testing   Echo: Date: 22 Results:  [facility]  Echocardiography Laboratory  [address] [place] [address]     Name: MARY JO CRAIN  MRN: [id number]  : 1965  Study Date: 2022 07:41 AM  Age: 57 yrs  Gender: Male  Patient Location: [facility]  Reason For Study: Cardiac Arrest  Ordering Physician: JOSE EDUARDO BRUNER  Referring Physician: Ki Powers  Performed By: Cory Burnett     BSA: 2.3 m2  Height: 72 in  Weight: 234 lb  HR: 47  BP: 106/55 mmHg  ______________________________________________________________________________  Procedure  Limited Portable Echo Adult.  ______________________________________________________________________________  Interpretation Summary     The visual ejection fraction is 55-60%.  There is mild (1+) tricuspid regurgitation.  There is mild (1+) aortic regurgitation.  large pleural effusion and ascietes noted  The rhythm was sinus bradycardia.  ______________________________________________________________________________  Left Ventricle  The left ventricle is normal in structure, function and size. There is normal  left ventricular wall thickness. The visual ejection fraction is 55-60%. Left  ventricular diastolic function is normal. No regional wall motion  abnormalities noted.     Right Ventricle  The right ventricle is normal in structure, function and size.     Atria  Normal left atrial size. Right atrial size is normal. There is no color  Doppler evidence of an atrial shunt.     Mitral Valve  There is mild mitral annular calcification. The mitral valve leaflets appear  thickened, but open well. There is trace to mild mitral regurgitation.     Tricuspid Valve  The tricuspid valve is normal in structure  and function. There is mild (1+)  tricuspid regurgitation. The right ventricular systolic pressure is  approximated at 28.3 mmHg plus the right atrial pressure.     Aortic Valve  There is moderate trileaflet aortic sclerosis. There is mild (1+) aortic  regurgitation.     Pulmonic Valve  The pulmonic valve is not well seen, but is grossly normal.     Pericardium  Trivial pericardial effusion. large pleural effusion and ascietes noted.     Rhythm  The rhythm was sinus bradycardia.     ______________________________________________________________________________  MMode/2D Measurements & Calculations  IVSd: 1.0 cm  LVIDd: 5.2 cm  LVIDs: 4.7 cm  LVPWd: 0.93 cm  FS: 10.4 %  LV mass(C)d: 190.4 grams  LV mass(C)dI: 83.6 grams/m2     LA dimension: 3.9 cm  asc Aorta Diam: 4.0 cm  RWT: 0.36     Doppler Measurements & Calculations  TR max brain: 266.0 cm/sec  TR max P.3 mmHg     ______________________________________________________________________________  Report approved by: Tereso Armstrong 2022 09:50 AM          Component 2 wk ago   LVEF  55-60%   Resulting Agency         Stress Test: Date: Results:    ECG Reviewed: Date: Results:    Cath: Date: Results:   (-) CAD   METS/Exercise Tolerance:     Hematologic:  - neg hematologic  ROS     Musculoskeletal:       GI/Hepatic: Comment: Liver transplant, elevated ammonia levels  ascites    (+) liver disease,  (-) GERD   Renal/Genitourinary:     (+) renal disease, type: ARF, Pt requires dialysis, Pt has history of transplant,     Endo:    (-) Type I DM and Type II DM   Psychiatric/Substance Use:       Infectious Disease: Comment: Influenza and aspergillus pneumonia    (+) Recent Fever,     Malignancy:       Other:            Physical Exam    Airway      Comment: ett in situ         Respiratory Devices and Support         Dental  no notable dental history         Cardiovascular   cardiovascular exam normal          Pulmonary   pulmonary exam normal                OUTSIDE  LABS:  CBC:   Lab Results   Component Value Date    WBC 8.9 11/29/2022    WBC 13.9 (H) 11/28/2022    HGB 7.4 (L) 11/29/2022    HGB 8.3 (L) 11/28/2022    HCT 22.3 (L) 11/29/2022    HCT 26.0 (L) 11/28/2022    PLT 57 (L) 11/29/2022    PLT 76 (L) 11/28/2022     BMP:   Lab Results   Component Value Date     11/29/2022     11/28/2022    POTASSIUM 4.4 11/29/2022    POTASSIUM 4.3 11/28/2022    CHLORIDE 105 11/29/2022    CHLORIDE 105 11/28/2022    CO2 24 11/29/2022    CO2 23 11/28/2022     (H) 11/29/2022    BUN 83 (H) 11/28/2022    CR 3.74 (H) 11/29/2022    CR 2.86 (H) 11/28/2022     (H) 11/29/2022     (H) 11/29/2022     COAGS:   Lab Results   Component Value Date    PTT 86 (H) 11/20/2022    INR 1.26 (H) 11/29/2022    FIBR 766 (H) 11/14/2022     POC:   Lab Results   Component Value Date     (H) 09/28/2016     HEPATIC:   Lab Results   Component Value Date    ALBUMIN 1.9 (L) 11/29/2022    PROTTOTAL 4.9 (L) 11/29/2022    ALT 24 11/29/2022    AST 19 11/29/2022    ALKPHOS 189 (H) 11/29/2022    BILITOTAL 0.9 11/29/2022    CHANDLER 123 (HH) 11/29/2022     OTHER:   Lab Results   Component Value Date    PH 7.34 (L) 11/23/2022    LACT 2.0 11/13/2022    A1C 4.7 11/19/2022    KELLY 7.9 (L) 11/29/2022    PHOS 4.2 11/29/2022    MAG 2.3 11/29/2022    LIPASE 63 (L) 09/22/2016    AMYLASE 72 09/22/2016    TSH 1.76 03/01/2016    CRP 22.3 (H) 11/22/2022       Anesthesia Plan    ASA Status:  4   NPO Status:  NPO Appropriate    Anesthesia Type: General.   Induction: Inhalation.           Consents    Anesthesia Plan(s) and associated risks, benefits, and realistic alternatives discussed. Questions answered and patient/representative(s) expressed understanding.    - Discussed:     - Discussed with:  Implied consent/emergency      - Extended Intubation/Ventilatory Support Discussed: Yes.      - Patient is DNR/DNI Status: No    Use of blood products discussed: No .     Postoperative Care            Comments:                 Geronimo Gage MD

## 2022-11-29 NOTE — PROGRESS NOTES
BRIEF NUTRITION NOTE:    Was asked to enter TF resumption orders when patient able to restart TF after procedures today.    NEW FINDINGS:  TF held yesterday (1830) and note patient had emesis x1 at 1800.   Stool:  x2 yesterday.  11/29:  Paracentesis = 3.3 Liters removed  11/29:   IR Procedure = Successful percutaneous 14 F G-tube  placement.     11/29:  WOCN   - Philtrum/Columella = evolving   - Right nose = evolving   - Bilateral buttock =  improving   - Mid upper lip = stable   - Right upper chin = stable    11/29:  Plan trach at 1530.    INTERVENTIONS:  Enteral Nutrition - Initiate TF Novasource Renal at 30 mL/hr; after 12 hrs increase to goal 55 mL/hr x20 hours (hold TF 1 hour prior/after BID for VFEND) = 2160 kcal (25 kcal/kg), 98 g protein (1.1 g/kg and 98% estimated needs), 197 g CHO, 0 g fiber, 774 mL H2O, 1020 mg K, and 884 gm Phos     Jigna Cruz, DEANNA, LD, CNSC

## 2022-11-29 NOTE — ANESTHESIA CARE TRANSFER NOTE
Patient: Blanco Osborne    Procedure: Procedure(s):  TRACHEOSTOMY       Diagnosis: Failure respiratory (H) [J96.90]  Diagnosis Additional Information: No value filed.    Anesthesia Type:   General     Note:    Oropharynx: ventilatory support (trach in place)  Level of Consciousness: iatrogenic sedation    Level of Supplemental Oxygen (L/min / FiO2): 12  Independent Airway: airway patency not satisfactory and stable  Dentition: dentition unchanged  Vital Signs Stable: post-procedure vital signs reviewed and stable  Report to RN Given: handoff report given  Patient transferred to: ICU    ICU Handoff: Call for PAUSE to initiate/utilize ICU HANDOFF, Identified Patient, Identified Responsible Provider, Reviewed the Pertinent Medical History, Discussed Surgical Course, Reviewed Intra-OP Anesthesia Management and Issues during Anesthesia, Set Expectations for Post Procedure Period and Allowed Opportunity for Questions and Acknowledgement of Understanding      Vitals:  Vitals Value Taken Time   /62 11/29/22 1645   Temp     Pulse 73 11/29/22 1645   Resp 15 11/29/22 1645   SpO2 96 % 11/29/22 1645   Vitals shown include unvalidated device data.    Electronically Signed By: STEFFANY Osorio CRNA  November 29, 2022  4:46 PM

## 2022-11-29 NOTE — PROGRESS NOTES
Pt remains vented, lethargic. Not following commands but opening eyes to voice and moving bilat UEs at times.  Right CT remains in place; no output this shift. Yrn gtt stopped this shift. 2 small soft BMs.  TF held for procedure @ 0800; Full vent settings over night.

## 2022-11-29 NOTE — PROGRESS NOTES
[facility] Nurse Inpatient Assessment     Consulted for: Philtrum/Columella, bilateral buttock, right nose, upper lip, upper chin, chest skin tear      Patient History (according to provider note(s):      57M cirrhosis, CARRILLO s/p liver txp (9/2016) admitted to ICU 11/12/22 after out of hospital PEA arrest and acute hypoxemic respiratory failure. Course c/b LORETTA requiring CRRT. Found to have Parainfluenza virus and Streptococcal bacteremia.     Areas Assessed:      Areas visualized during today's visit: Face and neck and Sacrum/coccyx    Pressure Injury Location: Philtrum/Columella    Last photo: 11/29/22 11/16/22  Philtrum/Columella    Right lateral nose 11/16  Wound type: Pressure Injury and Viral Lesions      Wound history/plan of care:  Wound initial suspected to be pressure as RT had found a tight ETAD and performed appropriate changes. 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     Tunneling N/A     Undermining N/A  Periwound skin: Ecchymosis      Color: normal and consistent with surrounding tissue      Temperature: normal   Odor: none  Pain: facial expression of distress,   Pain intervention prior to dressing change: slow and gentle cares   Treatment goal: Heal  and Protection  STATUS: evolving  Supplies ordered: supplies stored on unit and discussed with RN     Wound location: Right nose    Last photo: 11/29/22 11/16/22    Wound due to: Herpetic Lesion  Wound base: 100% dried drainage.      Palpation of the wound bed: normal      Drainage: scant     Description of drainage: serosanguinous     Measurements (length x width x depth, in cm): 1.5cm x 1cm x  UTD       Tunneling: N/A     Undermining:  N/A  Periwound skin: Intact      Color: normal and consistent with surrounding tissue      Temperature: normal   Odor: none  Pain: no grimacing or signs of discomfort,   Pain interventions prior to dressing change: slow and gentle cares   Treatment goal: Heal  and Protection  STATUS: evolving  Supplies ordered: supplies stored on unit and discussed with RN    Pressure Injury Location: Bilateral buttock    Last photo:11/29/22 11/16/22    Wound type: Pressure Injury and Friction     Pressure Injury Stage: 1, hospital acquired      Wound history/plan of care:  On admission, Patient has tan nonblanchable tissue to bilateral buttock that appears more likely from friction of the two buttocks rubbing against one another at they mirror one another, this discoloration appears to be old and in the healing process. However there is nonblanchable reddened tissue laterally to the left buttock that appears new, possibly from linens under patient.    Wound description : left buttock nonblanchable red epidermis      Palpation of the wound bed: normal      Drainage: none     Description of drainage: none     Measurements (length x width x depth, in cm):0.6x 0.7x 0cm     Tunneling N/A     Undermining N/A  Periwound skin: Intact      Color: pink and scant tan epidermis      Temperature: normal   Odor: none  Pain: absent and denies , none  Pain intervention prior to dressing change: patient tolerated well  Treatment goal: Heal  and Protection  STATUS: improving  Supplies ordered: supplies stored on unit and discussed with RN      My PI Risk Assessment     Sensory Perception: 1 - Completely Limited     Moisture: 3 - Occasionally moist      Activity: 1 - Bedfast      Mobility: 1 - Completely immobile      Nutrition: 1 - Very poor     Friction/Shear: 1 - Problem     TOTAL: 8    Wound location: mid upper lip    Last photo: 11/29/22 11/22/22    Wound due to: hereptic lesion,     Wound history/plan of care: Patient had emergent  intubation on 11/22. HSV 1 positive. No devices appear to be resting on injury to indicate pressure.     Wound base: 100 % red moist mucosal tissue     Palpation of the wound bed: normal      Drainage: moderate     Description of drainage: bloody     Measurements (length x width x depth, in cm): 0.4cm  x 0.7cm  x  0.1 cm      Tunneling: N/A     Undermining: N/A  Periwound skin: Intact      Color: normal and consistent with surrounding tissue      Temperature: normal   Odor: none  Pain: no grimacing or signs of discomfort, none  Pain interventions prior to dressing change: slow and gentle cares   Treatment goal: Heal  and Protection  STATUS: stable  Supplies ordered: supplies stored on unit and discussed with RN    Wound location: Right upper chin    Last photo: 11/29/22      Wound due to: herpetic lesion  Wound history/plan of care: Wound does not appear to be from pressure, no devices on this area especially without causing pressure further on lip.HSV1 positive   Wound base: 100 % non-blanchable, purple and epidermis     Palpation of the wound bed: normal      Drainage: none     Description of drainage: none     Measurements (length x width x depth, in cm): 0.5  x 1  x  0 cm      Tunneling: N/A     Undermining: N/A  Periwound skin: Intact      Color: normal and consistent with surrounding tissue      Temperature: normal   Odor: none  Pain: no grimacing or signs of discomfort, none  Pain interventions prior to dressing change: patient tolerated well  Treatment goal: Heal , leave ONEIL  STATUS: stable     Wound location: Upper check    Last photo: 11/29/22        Wound due to: Skin Tear  Wound history/plan of care: Unknown skin tear. Patient with surrounding ecchymosis. Low platelets and on heparin.  Wound base: 100 % dermis     Palpation of the wound bed: normal      Drainage: scant     Description of drainage: serosanguinous     Measurements (length x width x depth, in cm): 3  x 3  x  0.1 cm      Tunneling: N/A      Undermining: N/A  Periwound skin: Ecchymosis      Color: purple      Temperature: normal   Odor: none  Pain: no grimacing or signs of discomfort, none  Pain interventions prior to dressing change: patient tolerated well  Treatment goal: Heal  and Protection  STATUS: initial assessment  Supplies ordered: supplies stored on unit      Treatment Plan:     Chest Skin Tear: Q3D  1. Cleanse with wound cleanser. Pat dry with gauze.  2. Cover with oil emulsion gauze (adaptic).  3. Cover with Mepilex 4x4. Time and date.    Columella/Philtrum/Nose/ Upper lip/Lower lip wound(s): Every shift   1. Cleanse site with saline. Pat dry with gauze.  2. Ensure Etad has Q tip clearance and place cut strip of Mepilex 4x4 or Optifoam under ETAD to ensure offloading.  3. Apply acyclovir Q8H.   Avoid vaseline     Bilateral buttock wounds: BID and PRN with incontinence  1. Cleanse with Yara Cleanse and Protect Perineal Cleanser. Dry with gurpreet cloths  2. Apply nickel thick layer of critic-aid (#950075) to wound bed and thin layer over reddened areas   **No need to remove all paste after each incontinent episode, remove soiled paste and reapply as needed.  **If complete removal of paste is necessary use baby oil/mineral oil (Located in Pharmacy) and soft wash cloth.   Leave the brief off in bed to let the area dry as much as possible.   Use only one Covidien pad in between mattress and pt. No brief while in bed.    Pressure Injury prevention (please order supplies if not in room)  1. Turn/reposition every 1-2 hrs  2.   Float heels off bed with use of pillows or if needed Heel Lift boots (Prevalon #[id number])  3.   If incontinent Cleanse with incontinent cleanser (Yara spray #[id number]) followed by skin barrier protectant (Critic Aid paste)  BID and after each incontinent episode  4.   Prevent sliding and shear by limiting HOB to 30 degrees or less unless contraindicated, use knee gatch first if not contraindicated  5.   Chair cushion pressure  redistribution as needed (#[id number]): please limit sitting to an hour at a time  6.   Optimize nutrition   7.   PULSATE low air loss mattress     Orders: Reviewed and Updated    RECOMMEND PRIMARY TEAM ORDER: None, at this time  Education provided: importance of repositioning, plan of care, Moisture management, Hygiene and Off-loading pressure  Discussed plan of care with: Nurse  WO nurse follow-up plan: 1-2x weekly  Notify WOC if wound(s) deteriorate.  Nursing to notify the Provider(s) and re-consult the WOC Nurse if new skin concern.    DATA:     Current support surface: Standard  Low air loss mattress with pulsation , ICU  Containment of urine/stool: Incontinent pad in bed  BMI: Body mass index is 28.61 kg/m .   Active diet order: Orders Placed This Encounter      NPO for Medical/Clinical Reasons Except for: Other; Specify: NPO For oral intake and gastric feedings for 6 hours post insertion Gastrostomy G/GJ tube. May feed through Jejunal or Distal PORT immediately for GJ tubes ONLY.     Output: I/O last 3 completed shifts:  In: 999.63 [I.V.:249.63; NG/GT:90]  Out: 720 [Emesis/NG output:720]     Labs:   Recent Labs   Lab 11/29/22  0439 11/28/22  0504   ALBUMIN  --  2.0*   HGB 7.4* 8.3*   WBC 8.9 13.9*     Pressure injury risk assessment:   Sensory Perception: 3-->slightly limited  Moisture: 3-->occasionally moist  Activity: 1-->bedfast  Mobility: 2-->very limited  Nutrition: 3-->adequate  Friction and Shear: 1-->problem  Kareem Score: 13    Ashleigh Briggs [facility]   Dept. Pager: [phone]  Dept. Office Number: [phone]

## 2022-11-29 NOTE — PROGRESS NOTES
[facility] ICU RESPIRATORY NOTE        Date of Admission: 11/12/2022    Date of Intubation (most recent): 11/12/2022    Reason for Mechanical Ventilation: Respiratory failure.    Number of Days on Mechanical Ventilation: 4    Met Criteria for Spontaneous Breathing Trial: Yes -      Significant Events Today: None.    ABG Results:   Recent Labs   Lab 11/26/22  1343 11/26/22  1121 11/25/22  1947 11/23/22  1107 11/23/22  0442 11/22/22  1839 11/22/22  1545   PH  --   --   --  7.34* 7.47* 7.47* 7.49*   PCO2  --   --   --  43 32* 33* 32*   PO2  --   --   --  99 100 104 136*   HCO3  --   --   --  23 23 24 24   O2PER 70 60 45 30 40 40 40       Current Vent Settings: Vent Mode: CMV/AC  (Continuous Mandatory Ventilation/ Assist Control)  FiO2 (%): 30 %  Resp Rate (Set): 16 breaths/min  Tidal Volume (Set, mL): 450 mL  PEEP (cm H2O): 8 cmH2O  Pressure Support (cm H2O): 10 cmH2O  Resp: 12      Skin Assessment: patient has dried blood on lips and nostrils.    Plan: To be trached later today.     Arvin High, RT on 11/29/2022 at 4:52 AM

## 2022-11-29 NOTE — OP NOTE
DATE OF PROCEDURE: November 29, 2022      SURGEON:  Nilesh Jackson MD   FIRST ASSIST: Gunjan Gonzales PA-C      PREOPERATIVE DIAGNOSIS:  Respiratory failure.       POSTOPERATIVE DIAGNOSIS:  Respiratory failure.       PROCEDURE:  Tracheostomy with Shiley #8 cuffed nonfenestrated tube.       ANESTHESIA:  General.       INDICATIONS:  Patient has respiratory failure and will require prolonged mechanical ventilation.       DESCRIPTION OF PROCEDURE:  The patient was brought to the operating room on the ICU bed.  Under general anesthesia, the patient's neck was extended.  Neck and upper chest were prepared and draped in the usual fashion using ChloraPrep.  A transverse incision was made approximately 2 cm above the sternal notch.  Dissection was carried down to the midline of the strap muscle.  The inferior aspect of the isthmus of the thyroid was divided.  Hemostasis was excellent.  The second ring of the trachea was clearly identified, and some sutures of 2-0 Prolene were placed around the second ring laterally on each side.  A longitudinal tracheotomy was made and dilated.  The endotracheal tube was pulled just above the tracheotomy, and a Shiley #8 cuffed nonfenestrated tube was advanced without any difficulty.  The cuff was inflated.  Ventilation was excellent.  Hemostasis was again verified and was excellent.  Incision was closed with interrupted 3-0 nylon suture on the skin along the tracheostomy.  A dressing was applied, and the tracheostomy was secured around the patient's neck.       The patient was returned to ICU in satisfactory condition.           NILESH JACKSON MD

## 2022-11-29 NOTE — PROGRESS NOTES
Patient transported to  on [place] transport ventilator for paracentesis and G-tube placement around 0915. No issues to report, patient now back in adult ICU.    Moises Martinez, RRT on 11/29/2022 at 10:11 AM

## 2022-11-29 NOTE — PROCEDURES
[facility]    Procedure: G tube/ para    Date/Time: 11/29/2022 9:16 AM  Performed by: Jack Prather  Authorized by: Jack Prather       UNIVERSAL PROTOCOL   Site Marked: NA  Prior Images Obtained and Reviewed:  Yes  Required items: Required blood products, implants, devices and special equipment available    Patient identity confirmed:  Verbally with patient, arm band, provided demographic data and hospital-assigned identification number  Patient was reevaluated immediately before administering moderate or deep sedation or anesthesia  Confirmation Checklist:  Patient's identity using two indicators, relevant allergies, procedure was appropriate and matched the consent or emergent situation and correct equipment/implants were available  Time out: Immediately prior to the procedure a time out was called    Universal Protocol: the Joint Commission Universal Protocol was followed    Preparation: Patient was prepped and draped in usual sterile fashion       ANESTHESIA    Anesthesia: Local infiltration  Local Anesthetic:  Lidocaine 1% without epinephrine      SEDATION  Patient Sedated: Yes    Sedation Type:  Moderate (conscious) sedation  Vital signs: Vital signs monitored during sedation    See dictated procedure note for full details.  Findings: 16 F G tube  Para 3.3 liter    Specimens: none    Complications: None    Condition: Stable      PROCEDURE    Patient Tolerance:  Patient tolerated the procedure well with no immediate complications  Length of time physician/provider present for 1:1 monitoring during sedation: 30

## 2022-11-29 NOTE — PRE-PROCEDURE
GENERAL PRE-PROCEDURE:   Procedure:  G tube placement/para  Date/Time:  11/29/2022 8:16 AM    Written consent obtained?: Yes    Risks and benefits: Risks, benefits and alternatives were discussed    Consent given by:  Spouse  Patient states understanding of procedure being performed: Yes    Patient's understanding of procedure matches consent: Yes    Procedure consent matches procedure scheduled: Yes    Expected level of sedation:  Moderate  Appropriately NPO:  Yes  ASA Class:  2  Mallampati  :  N/A- Alternate secured airway  Lungs:  Lungs clear with good breath sounds bilaterally  Heart:  Normal heart sounds and rate  History & Physical reviewed:  History and physical reviewed and no updates needed  Statement of review:  I have reviewed the lab findings, diagnostic data, medications, and the plan for sedation

## 2022-11-29 NOTE — PROGRESS NOTES
[facility] ICU RESPIRATORY NOTE        Date of Admission: 11/12/2022    Date of Intubation (most recent): 11/26/2022.    Reason for Mechanical Ventilation: Respiratory Failure.    Number of Days on Mechanical Ventilation: Day 4 since reintubation.    Met Criteria for Spontaneous Breathing Trial: Yes.    Reason for No Spontaneous Breathing Trial: Full-support now until patient gets tracheostomy.    Significant Events Today: Transport to  for paracentesis and G-tube placement. Scheduled trach at 1530.    ABG Results:   Recent Labs   Lab 11/26/22  1343 11/26/22  1121 11/25/22  1947 11/23/22  1107 11/23/22  0442 11/22/22  1839 11/22/22  1545   PH  --   --   --  7.34* 7.47* 7.47* 7.49*   PCO2  --   --   --  43 32* 33* 32*   PO2  --   --   --  99 100 104 136*   HCO3  --   --   --  23 23 24 24   O2PER 70 60 45 30 40 40 40       Current Vent Settings: Vent Mode: CMV/AC  (Continuous Mandatory Ventilation/ Assist Control)  FiO2 (%): 30 %  Resp Rate (Set): 16 breaths/min  Tidal Volume (Set, mL): 450 mL  PEEP (cm H2O): 8 cmH2O  Pressure Support (cm H2O): 10 cmH2O  Resp: 18      Skin Assessment: Dried blood/scabs around nares and lips.    Plan: Continue with full support mechanical ventilation.     Moises Martinez, RRT on 11/29/2022 at 1:14 PM

## 2022-11-29 NOTE — IR NOTE
Interventional Radiology Intra-procedural Nursing Note    Patient Name: Blanco Osborne  Medical Record Number: [id number]  Today's Date: November 29, 2022    Procedure: G-tube placement and paracentesis with IV moderate sedation  Start time: 0851  End time: 0915  Report provided to: Marco GREEN  Patient depart time and location: 0920 to ICU Room 370    Note: Patient entered Interventional Radiology Suite number 1 via cart. Patient awake and vented. Assisted onto procedural table in supine position. Prepped and draped.  Dr. Prather in room. Time out and procedure started. Vital signs stable. Telemetry reading NSR.    Procedure well tolerated by patient without complications. Procedure end with debrief by Dr. Prather.  18 Fr g-tube placed in  LUQ, island dressing applied to left interventional procedure access site, dressing is c/d/i.     3.3L of yellow drainage out from paracentesis, gauze and tegaderm applied to insertion site, dressing is c/d/i.    Administered medication totals:  Lidocaine 1% 14 mL Intradermal  Versed 2 mg IVP  Fentanyl 100 mcg IVP    Last dose of sedation administered at 0911.

## 2022-11-29 NOTE — PROGRESS NOTES
Renal Medicine Progress Note                                Blanco Osborne MRN# [id number]   Age: 57 year old YOB: 1965   Date of Admission: 11/12/2022 Hospital LOS: 17                  Assessment/Plan:     Blanco Osborne is a 57-year-old male with PMH CARRILLO s/p liver transplant (9/2016) and cirrhosis admitted 11/12/2022 with out of hospital PEA arrest and acute hypoxemic respiratory failure. Course complicated by parainfluenza virus, streptococcal bacteremia, and LORETTA requiring CRRT.      1.  Severe anuric LORETTA:                -CRRT stopped 11/22 and resume on 11/24 and stopped 11/26.      2.  Septic shock with MODS:                -no current pressors                  3.  Respiratory failure:    -multiple re intubations    -R Pneumothorax    -Bilateral pleural effusions   -Aspergillus PNA             4.  ESLD due to CARRILLO s/p liver transplant                - tacrolimus on hold               - manage per ICU     5.  Hypoalbuminemia        CRRT discontinued 11/26    Dialysis today  Anticipate TTS schedule           Interval History:     Intubated  Narcotics received overnight  Lethargic post gastrostomy tube placement      No urine output    Creatinine upward trend continues       ROS:     Intubated and sedated: ROS unable    Medications and Allergies:     Reviewed    Physical Exam:     Vitals were reviewed  Patient Vitals for the past 8 hrs:   BP Temp Temp src Pulse Resp SpO2   11/29/22 1200 -- 97.4  F (36.3  C) -- -- -- --   11/29/22 0915 114/74 -- -- 70 18 98 %   11/29/22 0910 107/69 -- -- 75 18 97 %   11/29/22 0905 100/66 -- -- 74 18 98 %   11/29/22 0855 106/70 -- -- 77 16 95 %   11/29/22 0850 118/77 -- -- 80 18 97 %   11/29/22 0845 123/81 -- -- 81 17 98 %   11/29/22 0800 -- 97.3  F (36.3  C) Temporal -- -- --   11/29/22 0600 108/65 -- -- 68 16 99 %     I/O last 3 completed shifts:  In: 999.63 [I.V.:249.63; NG/GT:90]  Out: 720 [Emesis/NG output:720]    Vitals:    11/24/22 0400 11/25/22 0600  11/27/22 0400 11/28/22 0200   Weight: 99.1 kg (218 lb 7.6 oz) (P) 98.4 kg (216 lb 14.9 oz) 91 kg (200 lb 9.9 oz) 93.2 kg (205 lb 7.5 oz)    11/29/22 0000   Weight: 95.7 kg (210 lb 15.7 oz)         GENERAL:  intubated and sedated  HEENT: ETT  RESP:  clear anteriorly  CV: RRR, normal S1 S2  EXT: warm, no edema    Data:     Recent Labs   Lab 11/29/22  1153 11/29/22  0737 11/29/22  0439 11/29/22  0424 11/28/22  0508 11/28/22  0504 11/27/22  0921 11/27/22  0447 11/26/22  1524 11/26/22  1121   NA  --   --  138  --   --  140  --  139  --  137   POTASSIUM  --   --  4.4  --   --  4.3  --  4.1  --  4.2   CHLORIDE  --   --  105  --   --  105  --  107  --  106   CO2  --   --  24  --   --  23  --  24  --  23   ANIONGAP  --   --  9  --   --  12  --  8  --  8   * 158* 149* 149*   < > 245*   < > 175*   < > 123*   BUN  --   --  100*  --   --  83*  --  56*  --  37*   CR  --   --  3.74*  --   --  2.86*  --  2.02*  --  1.29*   GFRESTIMATED  --   --  18*  --   --  25*  --  38*  --  64   KELLY  --   --  7.9*  --   --  7.9*  --  7.7*  --  8.2*    < > = values in this interval not displayed.         Recent Labs   Lab Test 11/29/22  0439 11/28/22  0504 11/27/22  0447 11/26/22  1121 11/26/22  0403 11/25/22  1947 11/25/22  1150 11/25/22  0409 11/24/22 2002 11/24/22  1325   CR 3.74* 2.86* 2.02* 1.29* 1.28* 1.43* 1.79* 1.84* 2.30* 2.64*     Recent Labs   Lab 11/28/22  0504 11/27/22  0447 11/26/22  1121 11/26/22  0403   ALBUMIN 2.0* 2.0* 2.3* 2.1*     Recent Labs   Lab 11/29/22  0439 11/28/22  0504 11/27/22  0447 11/26/22  1121   PHOS 4.2 3.7 4.3 4.5   HGB 7.4* 8.3* 8.3* 9.5*         G Jose Reilly MD    [facility] - Nephrology  [phone]

## 2022-11-29 NOTE — ANESTHESIA POSTPROCEDURE EVALUATION
Patient: Blanco Osborne    Procedure: Procedure(s):  TRACHEOSTOMY       Anesthesia Type:  General    Note:     Postop Pain Control: Uneventful   PONV: No   Neuro/Psych: Uneventful            Sign Out: PLANNED postop sedation   Airway/Respiratory: Uneventful            Sign Out: AIRWAY IN SITU/Resp. Support               Airway in situ/Resp. Support: Tracheostomy                 Reason: Planned Pre-op   CV/Hemodynamics: Uneventful            Sign Out: Acceptable CV status   Other NRE: NONE   DID A NON-ROUTINE EVENT OCCUR?            Last vitals:  Vitals:    11/29/22 1530 11/29/22 1545 11/29/22 1646   BP: 108/64 100/62    Pulse: 79 79    Resp: 13 15    Temp:      SpO2: 100% 100% 97%       Electronically Signed By: Jack Lopez MD  November 29, 2022  5:17 PM

## 2022-11-30 ENCOUNTER — APPOINTMENT (OUTPATIENT)
Dept: ULTRASOUND IMAGING | Facility: CLINIC | Age: 57
DRG: 004 | End: 2022-11-30
Attending: INTERNAL MEDICINE
Payer: COMMERCIAL

## 2022-11-30 ENCOUNTER — APPOINTMENT (OUTPATIENT)
Dept: GENERAL RADIOLOGY | Facility: CLINIC | Age: 57
DRG: 004 | End: 2022-11-30
Attending: PHYSICIAN ASSISTANT
Payer: COMMERCIAL

## 2022-11-30 LAB
AMMONIA PLAS-SCNC: 76 UMOL/L (ref 10–50)
ANION GAP SERPL CALCULATED.3IONS-SCNC: 11 MMOL/L (ref 3–14)
BASE EXCESS BLDV CALC-SCNC: 2.4 MMOL/L (ref -7.7–1.9)
BUN SERPL-MCNC: 73 MG/DL (ref 7–30)
CALCIUM SERPL-MCNC: 8 MG/DL (ref 8.5–10.1)
CHLORIDE BLD-SCNC: 105 MMOL/L (ref 94–109)
CO2 SERPL-SCNC: 24 MMOL/L (ref 20–32)
CREAT SERPL-MCNC: 2.99 MG/DL (ref 0.66–1.25)
ERYTHROCYTE [DISTWIDTH] IN BLOOD BY AUTOMATED COUNT: 19.7 % (ref 10–15)
GFR SERPL CREATININE-BSD FRML MDRD: 23 ML/MIN/1.73M2
GLUCOSE BLD-MCNC: 143 MG/DL (ref 70–99)
GLUCOSE BLDC GLUCOMTR-MCNC: 121 MG/DL (ref 70–99)
GLUCOSE BLDC GLUCOMTR-MCNC: 131 MG/DL (ref 70–99)
GLUCOSE BLDC GLUCOMTR-MCNC: 157 MG/DL (ref 70–99)
GLUCOSE BLDC GLUCOMTR-MCNC: 161 MG/DL (ref 70–99)
GLUCOSE BLDC GLUCOMTR-MCNC: 181 MG/DL (ref 70–99)
GLUCOSE BLDC GLUCOMTR-MCNC: 192 MG/DL (ref 70–99)
HCO3 BLDV-SCNC: 26 MMOL/L (ref 21–28)
HCT VFR BLD AUTO: 24.1 % (ref 40–53)
HGB BLD-MCNC: 7.9 G/DL (ref 13.3–17.7)
MCH RBC QN AUTO: 33.9 PG (ref 26.5–33)
MCHC RBC AUTO-ENTMCNC: 32.8 G/DL (ref 31.5–36.5)
MCV RBC AUTO: 103 FL (ref 78–100)
O2/TOTAL GAS SETTING VFR VENT: 30 %
PCO2 BLDV: 36 MM HG (ref 40–50)
PH BLDV: 7.47 [PH] (ref 7.32–7.43)
PLATELET # BLD AUTO: 66 10E3/UL (ref 150–450)
PO2 BLDV: 38 MM HG (ref 25–47)
POTASSIUM BLD-SCNC: 4.3 MMOL/L (ref 3.4–5.3)
RBC # BLD AUTO: 2.33 10E6/UL (ref 4.4–5.9)
SODIUM SERPL-SCNC: 140 MMOL/L (ref 133–144)
TACROLIMUS BLD-MCNC: <1 UG/L (ref 5–15)
TME LAST DOSE: ABNORMAL H
TME LAST DOSE: ABNORMAL H
WBC # BLD AUTO: 10.7 10E3/UL (ref 4–11)

## 2022-11-30 PROCEDURE — 200N000001 HC R&B ICU

## 2022-11-30 PROCEDURE — 99291 CRITICAL CARE FIRST HOUR: CPT | Performed by: INTERNAL MEDICINE

## 2022-11-30 PROCEDURE — 250N000013 HC RX MED GY IP 250 OP 250 PS 637: Performed by: PHYSICIAN ASSISTANT

## 2022-11-30 PROCEDURE — 82140 ASSAY OF AMMONIA: CPT | Performed by: INTERNAL MEDICINE

## 2022-11-30 PROCEDURE — 94003 VENT MGMT INPAT SUBQ DAY: CPT

## 2022-11-30 PROCEDURE — 76705 ECHO EXAM OF ABDOMEN: CPT

## 2022-11-30 PROCEDURE — 250N000009 HC RX 250: Performed by: PHYSICIAN ASSISTANT

## 2022-11-30 PROCEDURE — 85027 COMPLETE CBC AUTOMATED: CPT | Performed by: INTERNAL MEDICINE

## 2022-11-30 PROCEDURE — 99232 SBSQ HOSP IP/OBS MODERATE 35: CPT | Performed by: INTERNAL MEDICINE

## 2022-11-30 PROCEDURE — 94640 AIRWAY INHALATION TREATMENT: CPT

## 2022-11-30 PROCEDURE — 94640 AIRWAY INHALATION TREATMENT: CPT | Mod: 76

## 2022-11-30 PROCEDURE — 82803 BLOOD GASES ANY COMBINATION: CPT | Performed by: INTERNAL MEDICINE

## 2022-11-30 PROCEDURE — 250N000012 HC RX MED GY IP 250 OP 636 PS 637: Performed by: INTERNAL MEDICINE

## 2022-11-30 PROCEDURE — 71045 X-RAY EXAM CHEST 1 VIEW: CPT

## 2022-11-30 PROCEDURE — 250N000011 HC RX IP 250 OP 636: Performed by: PHYSICIAN ASSISTANT

## 2022-11-30 PROCEDURE — 250N000009 HC RX 250: Performed by: INTERNAL MEDICINE

## 2022-11-30 PROCEDURE — 999N000253 HC STATISTIC WEANING TRIALS

## 2022-11-30 PROCEDURE — 999N000157 HC STATISTIC RCP TIME EA 10 MIN

## 2022-11-30 PROCEDURE — 82310 ASSAY OF CALCIUM: CPT | Performed by: INTERNAL MEDICINE

## 2022-11-30 PROCEDURE — 80197 ASSAY OF TACROLIMUS: CPT | Performed by: STUDENT IN AN ORGANIZED HEALTH CARE EDUCATION/TRAINING PROGRAM

## 2022-11-30 RX ADMIN — IPRATROPIUM BROMIDE AND ALBUTEROL SULFATE 3 ML: .5; 3 SOLUTION RESPIRATORY (INHALATION) at 11:12

## 2022-11-30 RX ADMIN — Medication 1 DROP: at 08:10

## 2022-11-30 RX ADMIN — HEPARIN SODIUM 5000 UNITS: 5000 INJECTION, SOLUTION INTRAVENOUS; SUBCUTANEOUS at 06:03

## 2022-11-30 RX ADMIN — INSULIN ASPART 3 UNITS: 100 INJECTION, SOLUTION INTRAVENOUS; SUBCUTANEOUS at 16:20

## 2022-11-30 RX ADMIN — INSULIN GLARGINE 10 UNITS: 100 INJECTION, SOLUTION SUBCUTANEOUS at 08:11

## 2022-11-30 RX ADMIN — Medication 1 DROP: at 16:22

## 2022-11-30 RX ADMIN — LACTULOSE 10 G: 20 SOLUTION ORAL at 16:15

## 2022-11-30 RX ADMIN — ACYCLOVIR: 50 OINTMENT TOPICAL at 14:34

## 2022-11-30 RX ADMIN — LACTULOSE 10 G: 20 SOLUTION ORAL at 21:22

## 2022-11-30 RX ADMIN — HYDROCORTISONE SODIUM SUCCINATE 50 MG: 100 INJECTION, POWDER, FOR SOLUTION INTRAMUSCULAR; INTRAVENOUS at 11:56

## 2022-11-30 RX ADMIN — IPRATROPIUM BROMIDE AND ALBUTEROL SULFATE 3 ML: .5; 3 SOLUTION RESPIRATORY (INHALATION) at 18:54

## 2022-11-30 RX ADMIN — CHLORHEXIDINE GLUCONATE 0.12% ORAL RINSE 15 ML: 1.2 LIQUID ORAL at 19:55

## 2022-11-30 RX ADMIN — INSULIN ASPART 1 UNITS: 100 INJECTION, SOLUTION INTRAVENOUS; SUBCUTANEOUS at 12:02

## 2022-11-30 RX ADMIN — Medication 1 DROP: at 21:23

## 2022-11-30 RX ADMIN — HYDROCORTISONE SODIUM SUCCINATE 50 MG: 100 INJECTION, POWDER, FOR SOLUTION INTRAMUSCULAR; INTRAVENOUS at 00:08

## 2022-11-30 RX ADMIN — HEPARIN SODIUM 5000 UNITS: 5000 INJECTION, SOLUTION INTRAVENOUS; SUBCUTANEOUS at 21:22

## 2022-11-30 RX ADMIN — INSULIN ASPART 2 UNITS: 100 INJECTION, SOLUTION INTRAVENOUS; SUBCUTANEOUS at 04:20

## 2022-11-30 RX ADMIN — ACYCLOVIR: 50 OINTMENT TOPICAL at 21:22

## 2022-11-30 RX ADMIN — HYDROCORTISONE SODIUM SUCCINATE 50 MG: 100 INJECTION, POWDER, FOR SOLUTION INTRAMUSCULAR; INTRAVENOUS at 06:02

## 2022-11-30 RX ADMIN — CHLORHEXIDINE GLUCONATE 0.12% ORAL RINSE 15 ML: 1.2 LIQUID ORAL at 08:09

## 2022-11-30 RX ADMIN — Medication 15 ML: at 08:10

## 2022-11-30 RX ADMIN — SENNOSIDES AND DOCUSATE SODIUM 1 TABLET: 50; 8.6 TABLET ORAL at 08:09

## 2022-11-30 RX ADMIN — HYDROCORTISONE SODIUM SUCCINATE 50 MG: 100 INJECTION, POWDER, FOR SOLUTION INTRAMUSCULAR; INTRAVENOUS at 18:29

## 2022-11-30 RX ADMIN — HEPARIN SODIUM 5000 UNITS: 5000 INJECTION, SOLUTION INTRAVENOUS; SUBCUTANEOUS at 14:16

## 2022-11-30 RX ADMIN — IPRATROPIUM BROMIDE AND ALBUTEROL SULFATE 3 ML: .5; 3 SOLUTION RESPIRATORY (INHALATION) at 15:24

## 2022-11-30 RX ADMIN — Medication 40 MG: at 08:18

## 2022-11-30 RX ADMIN — SODIUM CHLORIDE, PRESERVATIVE FREE 10 ML: 5 INJECTION INTRAVENOUS at 18:32

## 2022-11-30 RX ADMIN — TACROLIMUS 0.5 MG: 5 CAPSULE ORAL at 21:23

## 2022-11-30 RX ADMIN — LACTULOSE 10 G: 20 SOLUTION ORAL at 08:09

## 2022-11-30 RX ADMIN — POLYETHYLENE GLYCOL 3350 17 G: 17 POWDER, FOR SOLUTION ORAL at 08:09

## 2022-11-30 RX ADMIN — ACYCLOVIR: 50 OINTMENT TOPICAL at 06:06

## 2022-11-30 RX ADMIN — INSULIN ASPART 1 UNITS: 100 INJECTION, SOLUTION INTRAVENOUS; SUBCUTANEOUS at 19:43

## 2022-11-30 ASSESSMENT — ACTIVITIES OF DAILY LIVING (ADL)
ADLS_ACUITY_SCORE: 49
ADLS_ACUITY_SCORE: 49
ADLS_ACUITY_SCORE: 53
ADLS_ACUITY_SCORE: 46
ADLS_ACUITY_SCORE: 49

## 2022-11-30 NOTE — PROGRESS NOTES
Pt received trach today (Shiley 8 cuffed) and a few hours afterwards the cuff was blown and needed a replacement trach. Trach replaced with Shiley 6XLT. Placement of trach confirmed by bilateral breath sounds and CO2 colormetric detector. Pt remained stable throughout.     RT will continue to follow    Augusta Donald RT

## 2022-11-30 NOTE — PROGRESS NOTES
Renal Medicine Progress Note                                Blanco Osborne MRN# [id number]   Age: 57 year old YOB: 1965   Date of Admission: 11/12/2022 Hospital LOS: 18                  Assessment/Plan:     Blanco Osborne is a 57-year-old male with PMH CARRILLO s/p liver transplant (9/2016) and cirrhosis admitted 11/12/2022 with out of hospital PEA arrest and acute hypoxemic respiratory failure. Course complicated by parainfluenza virus, streptococcal bacteremia, and LORETTA requiring CRRT.      1.  Severe anuric LORETTA:             -CRRT stopped 11/22 and resume on 11/24 and stopped 11/26.    -IHD 11/29/30     2.  Septic shock with MODS:                -no current pressors                  3.  Respiratory failure:    -multiple re intubations    -R Pneumothorax    -Bilateral pleural effusions   -Aspergillus PNA             4.  ESLD due to CARRILLO s/p liver transplant                - tacrolimus on hold               - manage per ICU     5.  Hypoalbuminemia        CRRT discontinued 11/26  IHD 11/29/30      Plan dialysis 12/01/22        Interval History:     Intubated  Trach and PEG    No UO       ROS:     Intubated and sedated: ROS unable    Medications and Allergies:     Reviewed    Physical Exam:     Vitals were reviewed  Patient Vitals for the past 8 hrs:   BP Temp Temp src Pulse Resp SpO2 Weight   11/30/22 0830 -- 98.2  F (36.8  C) Skin -- -- -- --   11/30/22 0630 97/64 -- -- 83 20 99 % --   11/30/22 0615 101/72 -- -- 97 19 100 % --   11/30/22 0600 106/72 -- -- 84 14 100 % --   11/30/22 0545 102/68 -- -- 81 15 100 % --   11/30/22 0530 98/66 -- -- 84 13 99 % --   11/30/22 0515 101/67 -- -- 84 14 100 % --   11/30/22 0500 100/67 -- -- 89 16 99 % --   11/30/22 0445 (!) 87/58 -- -- 87 13 100 % --   11/30/22 0430 (!) 88/60 -- -- 88 13 99 % --   11/30/22 0415 95/60 -- -- 86 18 99 % --   11/30/22 0400 105/68 97.5  F (36.4  C) Temporal 81 13 100 % 88.5 kg (195 lb 1.7 oz)   11/30/22 0345 98/63 -- -- 82 16 100 % --    11/30/22 0330 94/67 -- -- 84 15 100 % --   11/30/22 0315 94/67 -- -- 84 16 100 % --   11/30/22 0300 100/66 -- -- 84 15 100 % --   11/30/22 0245 93/70 -- -- 85 15 100 % --   11/30/22 0230 100/67 -- -- 86 17 100 % --   11/30/22 0215 103/70 -- -- 88 16 100 % --   11/30/22 0200 97/62 -- -- 88 16 100 % --   11/30/22 0145 98/63 -- -- 89 17 100 % --     I/O last 3 completed shifts:  In: 540 [I.V.:240; NG/GT:60]  Out: 5040 [Emesis/NG output:200; Drains:3300; Other:1500; Chest Tube:40]    Vitals:    11/25/22 0600 11/27/22 0400 11/28/22 0200 11/29/22 0000   Weight: (P) 98.4 kg (216 lb 14.9 oz) 91 kg (200 lb 9.9 oz) 93.2 kg (205 lb 7.5 oz) 95.7 kg (210 lb 15.7 oz)    11/30/22 0400   Weight: 88.5 kg (195 lb 1.7 oz)       GENERAL:  intubated and sedated  HEENT: ETT  RESP:  clear anteriorly  CV: RRR, normal S1 S2  EXT: warm, no edema    Data:     Recent Labs   Lab 11/30/22  0807 11/30/22  0806 11/30/22  0418 11/30/22  0005 11/29/22  0737 11/29/22  0439 11/28/22  0508 11/28/22  0504 11/27/22  0921 11/27/22  0447     --   --   --   --  138  --  140  --  139   POTASSIUM 4.3  --   --   --   --  4.4  --  4.3  --  4.1   CHLORIDE 105  --   --   --   --  105  --  105  --  107   CO2 24  --   --   --   --  24  --  23  --  24   ANIONGAP 11  --   --   --   --  9  --  12  --  8   * 131* 181* 121*   < > 149*   < > 245*   < > 175*   BUN 73*  --   --   --   --  100*  --  83*  --  56*   CR 2.99*  --   --   --   --  3.74*  --  2.86*  --  2.02*   GFRESTIMATED 23*  --   --   --   --  18*  --  25*  --  38*   KELLY 8.0*  --   --   --   --  7.9*  --  7.9*  --  7.7*    < > = values in this interval not displayed.         Recent Labs   Lab Test 11/30/22  0807 11/29/22 0439 11/28/22  0504 11/27/22  0447 11/26/22  1121 11/26/22  0403 11/25/22  1947 11/25/22  1150 11/25/22  0409 11/24/22 2002   CR 2.99* 3.74* 2.86* 2.02* 1.29* 1.28* 1.43* 1.79* 1.84* 2.30*     Recent Labs   Lab 11/29/22  0439 11/28/22  0504 11/27/22  0447 11/26/22  1121    ALBUMIN 1.9* 2.0* 2.0* 2.3*     Recent Labs   Lab 11/30/22  0807 11/29/22  0439 11/28/22  0504 11/27/22  0447 11/26/22  1121   PHOS  --  4.2 3.7 4.3 4.5   HGB 7.9* 7.4* 8.3* 8.3* 9.5*         G Jose Reilly MD    [facility] - Nephrology  [phone]

## 2022-11-30 NOTE — PROGRESS NOTES
Potassium   Date Value Ref Range Status   11/29/2022 4.4 3.4 - 5.3 mmol/L Final   04/20/2020 3.9 3.4 - 5.3 mmol/L Final     Hemoglobin   Date Value Ref Range Status   11/29/2022 7.4 (L) 13.3 - 17.7 g/dL Final   04/20/2020 14.3 13.3 - 17.7 g/dL Final     Creatinine   Date Value Ref Range Status   11/29/2022 3.74 (H) 0.66 - 1.25 mg/dL Final   04/20/2020 1.06 0.66 - 1.25 mg/dL Final     Urea Nitrogen   Date Value Ref Range Status   11/29/2022 100 (H) 7 - 30 mg/dL Final   04/20/2020 23 7 - 30 mg/dL Final     Sodium   Date Value Ref Range Status   11/29/2022 138 133 - 144 mmol/L Final   04/20/2020 139 133 - 144 mmol/L Final     INR   Date Value Ref Range Status   11/29/2022 1.26 (H) 0.85 - 1.15 Final   10/07/2019 1.20 (H) 0.86 - 1.14 Final       DIALYSIS PROCEDURE NOTE  Hepatitis status of previous patient on machine log was checked and verified ok to use with this patients hepatitis status.  Patient dialyzed for 3 hrs. on a K3 bath with a net fluid removal of  1.5L.  A BFR of 400 ml/min was obtained via a RIJ.      The treatment plan was discussed with Dr. Bradshaw during the treatment.    Total heparin received during the treatment: 0 units.     Line flushed, clamped and capped with heparin 1:1000 1.3 mL (1300 units) per lumen    Meds  given: none   Complications: none; needs [facility] consent form signed per notes nephrologist already discussed with family and received consent; dressing could not be changed due to the issues with the tracheostomy today. Discussed with primary nurse, did not want dressing changed due to it lying under trach band, and trach was just switched out today.       Unable to educate; middle of the night and patient obtunded.    ICEBOAT? Timeout performed pre-treatment  I: Patient was identified using 2 identifiers  C:  Consent Signed Yes  E: Equipment preventative maintenance is current and dialysis delivery system OK to use  B: Hepatitis B Surface Antigen: Negative; Draw Date: 11/14/22       Hepatitis B Surface Antibody: Susceptible; Draw Date: 11/14/22  O: Dialysis orders present and complete prior to treatment  A: Vascular access verified and assessed prior to treatment  T: Treatment was performed at a clinically appropriate time  ?: Patient was allowed to ask questions and address concerns prior to treatment  See Adult Hemodialysis flowsheet in [facility] for further details and post assessment.  Machine water alarm in place and functioning. Transducer pods intact and checked every 15min.   Pt dialyzed in ICU.  Chlorine/Chloramine water system checked every 4 hours.  Outpatient Dialysis at D    Patient repositioned every 2 hours during the treatment.  Post treatment report given to YENNIFER Evans RN regarding 1.5L of fluid. RN monitoring Bps and made aware of CPOT score of 0.

## 2022-11-30 NOTE — PROGRESS NOTES
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  Likely secondary to acute respiratory failure and COPD exacerbation and parainfluenza and strep pneumo pneumonias. He is down to 30% and +8 PEEP but has failed extubation twice and plans are for a trach/PEG early this week. He also has acute renal failure and is dialysis dependent.Mental status has been improving and was able to participate in a conversation about trach on Sunday but mental status is deteriorated since.  Got 10 mg of oxycodone on Sunday and then received some fentanyl and midazolam yesterday.  During PEG tube they noticed ascitic fluid and drained 3 L.  No studies were performed.  Also had elevated ammonia.      Assessment and plan :     Blanco Osborne IS a 57 year old male admitted on 11/12/2022 for acute respiratory failure.   I have personally reviewed the daily labs, imaging studies, cultures and discussed the case with referring physician and consulting physicians.     My assessment and plan by system for this patient is as follows:    Neurology/Psychiatry:   Possible anoxic encephalopathy.  Alhough has been intermittently awake and following commands, long term CNS prognosis  "is guarded. MRI  11/17 looks \"worse than what we have seen from him thus far\".  More somnolent in the last couple of days possibly related to medication.  Also newly discovered high ammonia level.  -Hold all sedatives.  -Lactulose started last night  -If he is stooling tomorrow and still has depressed mental status we will get an MRI.    Cardiovascular:   1.  Status post PEA arrest 20 minutes of CPR.  Not a primary cardiac event.  Most likely secondary to respiratory failure. FRANKLIN was normal.  Was in shock which is now resolved.  Lipids and hemoglobin A1c were both within normal limits.      Pulmonary/Ventilator Management:   Acute respiratory failure and ARDS secondary to viral and bacterial pneumonia -improved.  Reintubated twice secondary to same as well as weakness encephalopathy and inability to manage secretions.  Long talk on 11/27 with Dr. Jones resulted in a decision to proceed with tracheostomy.  Also with pneumothorax status post chest tube.  No airleak seen today and chest tube with minimal output. CT on water seal for many days.  Tracheostomy was done on 1129.  Had to be urgently replaced because of malfunctioning cuff.  Exchanged for an XLT.  -Remove chest tube today  -Pressure support mode indefinitely    GI and Nutrition :   1.  Status post liver transplant.  On chronic tacrolimus.  Follows at the [facility].  We have been touching base with them regularly.  2.  Tolerating enteral nutrition   3.  Newly discovered ascites and subsequent hyperammonemia.  Does not look as though he has had imaging in at least a few years. I see no reports of ascites post transplant.  Could be secondary to overall volume overload from renal failure but I do not have a good explanation for the ammonia.  Bilirubin and platelets were normal prior to admission.  Ultrasound last night did not show any evidence of portal vein thrombosis.  Imaging was not comprehensive enough to fully examine liver.  -Right upper quadrant " ultrasound today  -Continue treatment for hepatic encephalopathy.    Renal/Fluids/Electrolytes:   1.  Acute renal failure now dialysis dependent.  No indication as of yet return of renal function.  Intermittent dialysis ongoing    Infectious Disease:   1.  Streptococcal pneumonia as well as parainfluenza as well as possible Aspergillus thought less likely by infectious disease consultant.  Also herpes labialis infection.  Completed antibiotics.  2.  Tacrolimus level still therapeutic  -Acyclovir for 21-day course      Endocrine:   1. Glucoses ok on coverage.  On stress dose steroids with hydrocortisone.  -Continue hydrocortisone for now.  Discussed resumption of tacrolimus with transplant service.    Hematology/Oncology:   1.  Anemia of critical illness and also secondary to kidney disease.  Thrombocytopenia unclear etiology but stable.  -Continue prophylactic heparin.  Evaluating for cirrhosis.  May consider further work-up pending these results.     IV/Access:   1. Venous access -   2. Arterial access -   3.  Plan  - central access required and necessary.  Consider removal tomorrow      ICU Prophylaxis:   1. DVT: Hep Subq/ LMWH/mechanical  2. VAP: HOB 30 degrees, chlorhexidine rinse  3. Stress Ulcer: PPI/H2 blocker  4. Restraints: Nonviolent soft two point restraints required and necessary for patient safety and continued cares and good effect as patient continues to pull at necessary lines, tubes despite education and distraction. Will readdress daily.   5. IV Access - central access required and necessary for continued patient cares  6. Feeding - enteral   7. Family Update: Bedside  8. Disposition - ICU care       Key goals for next 24 hours:   1.  Hold sedatives  2.  Lactulose and evaluate for cirrhosis  3.  Remove chest tube and pressure support indefinitely         [facility] Medications:       - MEDICATION INSTRUCTIONS for Dialysis Patients -   Does not apply See Admin Instructions     acyclovir   Topical Q8H      artificial tears  1 drop Both Eyes TID     chlorhexidine  15 mL Mouth/Throat Q12H     heparin ANTICOAGULANT  5,000 Units Subcutaneous Q8H     heparin lock flush  5-20 mL Intracatheter Q24H     hydrocortisone sodium succinate PF  50 mg Intravenous Q6H     insulin aspart  1-12 Units Subcutaneous Q4H     insulin glargine  10 Units Subcutaneous QAM AC     ipratropium - albuterol 0.5 mg/2.5 mg/3 mL  3 mL Nebulization 4x daily     lactulose  10 g Oral TID     multivitamins w/minerals  15 mL Per Feeding Tube Daily     pantoprazole  40 mg Per Feeding Tube QAM AC    Or     pantoprazole  40 mg Intravenous QAM AC     polyethylene glycol  17 g Oral Daily     senna-docusate  1 tablet Oral BID    Or     senna-docusate  2 tablet Oral BID     sodium chloride (PF)  10-40 mL Intracatheter Q8H     sodium chloride (PF)  10-40 mL Intracatheter Q8H       dexmedetomidine Stopped (11/25/22 1827)     dextrose       - MEDICATION INSTRUCTIONS -       - MEDICATION INSTRUCTIONS -       norepinephrine       phenylephrine Stopped (11/29/22 0500)     sodium chloride 10 mL/hr at 11/29/22 0800     sodium chloride 0 mL/hr at 11/22/22 0742     vasopressin                 Physical Examination:   Temp:  [96.9  F (36.1  C)-98.2  F (36.8  C)] 98.2  F (36.8  C)  Pulse:  [] 83  Resp:  [10-23] 20  BP: ()/(52-80) 97/64  FiO2 (%):  [30 %] 30 %  SpO2:  [91 %-100 %] 99 %           Intake/Output Summary (Last 24 hours) at 11/30/2022 0947  Last data filed at 11/30/2022 0600  Gross per 24 hour   Intake 520 ml   Output 1500 ml   Net -980 ml         Wt Readings from Last 4 Encounters:   11/30/22 88.5 kg (195 lb 1.7 oz)   10/07/19 137.5 kg (303 lb 3.2 oz)   10/04/19 131.5 kg (290 lb)   02/20/19 140.4 kg (309 lb 9.6 oz)     BP - Mean:  [] 73  Vent Mode: PS  (Pressure Support)  FiO2 (%): 30 %  Resp Rate (Set): 16 breaths/min  Tidal Volume (Set, mL): 450 mL  PEEP (cm H2O): 5 cmH2O  Pressure Support (cm H2O): 12 cmH2O  Resp: 20    Recent Labs   Lab  11/26/22  1343 11/26/22  1121 11/25/22  1947 11/23/22  1107   PH  --   --   --  7.34*   PCO2  --   --   --  43   PO2  --   --   --  99   HCO3  --   --   --  23   O2PER 70 60 45 30       GEN: Intubated, not interactive  HEENT: head ncat, sclera anicteric, OP patent, trachea midline, healing ulcers in nostrils and mouth   PULM: Right-sided chest tube bandages clean, dried blood on tracheostomy site but no active bleeding pulling good tidal volumes on pressure support with occasional irregular respiratory pattern  CV/COR: RRR S1S2 no gallop,  No rub, no murmur  ABD: soft nontender, nondistended, PEG tube site looks clean  EXT:  Mod edema, warm  NEURO: Spontaneously opening and closing mouth eyes also partially open.  Moves spontaneously.  Does not interact.  SKIN: no obvious rash; no cyanosis or mottling   LINES: clean, dry intact         Data:   All data and imaging reviewed     ROUTINE ICU LABS (Last four results)  CMP  Recent Labs   Lab 11/30/22  0807 11/30/22  0806 11/30/22  0418 11/30/22  0005 11/29/22  0737 11/29/22  0439 11/28/22  0508 11/28/22  0504 11/27/22  0921 11/27/22  0447 11/26/22  1524 11/26/22  1121 11/26/22  0411 11/26/22  0403     --   --   --   --  138  --  140  --  139  --  137  --  138   POTASSIUM 4.3  --   --   --   --  4.4  --  4.3  --  4.1  --  4.2  --  4.3   CHLORIDE 105  --   --   --   --  105  --  105  --  107  --  106  --  105   CO2 24  --   --   --   --  24  --  23  --  24  --  23  --  27   ANIONGAP 11  --   --   --   --  9  --  12  --  8  --  8  --  6   * 131* 181* 121*   < > 149*   < > 245*   < > 175*   < > 123*   < > 120*   BUN 73*  --   --   --   --  100*  --  83*  --  56*  --  37*  --  40*   CR 2.99*  --   --   --   --  3.74*  --  2.86*  --  2.02*  --  1.29*  --  1.28*   GFRESTIMATED 23*  --   --   --   --  18*  --  25*  --  38*  --  64  --  65   KELLY 8.0*  --   --   --   --  7.9*  --  7.9*  --  7.7*  --  8.2*  --  8.7   MAG  --   --   --   --   --  2.3  --   --   --  2.1   --  1.9  --  1.9   PHOS  --   --   --   --   --  4.2  --  3.7  --  4.3  --  4.5  --  4.5   PROTTOTAL  --   --   --   --   --  4.9*  --  5.1*  --  5.0*  --  5.7*  --  6.0*   ALBUMIN  --   --   --   --   --  1.9*  --  2.0*  --  2.0*  --  2.3*  --  2.1*   BILITOTAL  --   --   --   --   --  0.9  --  1.0  --  1.1  --   --   --  1.6*   ALKPHOS  --   --   --   --   --  189*  --  250*  --  177*  --   --   --  210*   AST  --   --   --   --   --  19  --  23  --  33  --   --   --  39   ALT  --   --   --   --   --  24  --  30  --  29  --   --   --  37    < > = values in this interval not displayed.     CBC  Recent Labs   Lab 11/30/22  0807 11/29/22  0439 11/28/22  0504 11/27/22  0447   WBC 10.7 8.9 13.9* 15.2*   RBC 2.33* 2.19* 2.47* 2.48*   HGB 7.9* 7.4* 8.3* 8.3*   HCT 24.1* 22.3* 26.0* 26.5*   * 102* 105* 107*   MCH 33.9* 33.8* 33.6* 33.5*   MCHC 32.8 33.2 31.9 31.3*   RDW 19.7* 19.0* 19.2* 18.9*   PLT 66* 57* 76* 99*     INR  Recent Labs   Lab 11/29/22  1402   INR 1.26*     Arterial Blood Gas  Recent Labs   Lab 11/26/22  1343 11/26/22  1121 11/25/22  1947 11/23/22  1107   PH  --   --   --  7.34*   PCO2  --   --   --  43   PO2  --   --   --  99   HCO3  --   --   --  23   O2PER 70 60 45 30       All cultures:  No results for input(s): CULT in the last 168 hours.      Laura Fernandes MD    Billing: This patient is critically ill: Yes. Total critical care time today 55 min.

## 2022-11-30 NOTE — PROGRESS NOTES
Infection Prevention Notes: Patient was diagnosed with Parainfluenza on 11/13/22. It is recommended that a patient stays in Droplet/Contact precautions for the duration of illness. This patient is a complex patient with other infections.  Infection Prevention will defer to the provider to assess if the parainfluenza virus is still causing illness or not.  Marilynn Castillo, Infection Prevention on 11/30/2022 at 12:01 PM

## 2022-11-30 NOTE — PROGRESS NOTES
[facility] ICU RESPIRATORY NOTE        Date of Admission: 11/12/2022    Date of Intubation (most recent): 11/26/2022    Reason for Mechanical Ventilation: Respiratory failure.    Number of Days on Mechanical Ventilation: 5    Met Criteria for Spontaneous Breathing Trial: No    Reason for No Spontaneous Breathing Trial: Per MD.    Significant Events Today: None.     ABG Results:   Recent Labs   Lab 11/26/22  1343 11/26/22  1121 11/25/22  1947 11/23/22  1107 11/23/22  0442   PH  --   --   --  7.34* 7.47*   PCO2  --   --   --  43 32*   PO2  --   --   --  99 100   HCO3  --   --   --  23 23   O2PER 70 60 45 30 40       Current Vent Settings: Vent Mode: CMV/AC  (Continuous Mandatory Ventilation/ Assist Control)  FiO2 (%): 30 %  Resp Rate (Set): 16 breaths/min  Tidal Volume (Set, mL): 450 mL  PEEP (cm H2O): 8 cmH2O  Resp: 16      RT Reggie on 11/30/2022 at 2:33 AM

## 2022-11-30 NOTE — PLAN OF CARE
0700 - 1900 RN Shift Summary    PEG placed this AM, trached in the PM. Trach exchanged late afternoon at bedside by MD due to cuff leak, from 8 to 6XLT. Tube feeds restarted after trach placement, however held again at 1900 for planned ultrasound this PM. Patient remains obtunded, withdraws to pain. Remains anuric, dialysis planned for later tonight. Paracentesis performed in IR, more than 3L out. Spouse updated over the phone.

## 2022-11-30 NOTE — PROGRESS NOTES
THORACIC SURGERY    Tracheostomy ok  No bleeding    KEYONNA RANDHAWA MD [facility] ONCOLOGY THORACIC SURGERY  CELL:  [phone]  OFFICE: [phone]

## 2022-11-30 NOTE — PROGRESS NOTES
Pt has been on PS 12/10/5 since 0730 and has been doing well.  Pt to remain on PS as long as tolerated per MD.  Cheng Douglass, RT  11/30/2022

## 2022-11-30 NOTE — PROGRESS NOTES
Interventional Radiology - Progress Note  Inpatient - [facility]  11/30/2022    IR Brief Note    S/P G-Tube placement and LVP yesterday without complication. HGB stable today. Site clean per chart. Tube should remain in place minimum 6 weeks. Thank you for allowing us to participate in this patient's care. IR will no longer follow. Please contact our service with any questions, concerns, or requests for further intervention.    Russell Horn PA-C  Interventional Radiology  [phone] (IR)  [phone] ([facility] Office)

## 2022-11-30 NOTE — PROGRESS NOTES
Physical Therapy Discharge Summary    Reason for therapy discharge:    Change in medical status.    Progress towards therapy goal(s). See goals on Care Plan in [facility] electronic health record for goal details.  Goals not met.  Barriers to achieving goals:   Pt decline in status.    Therapy recommendation(s):    Asked to complete rehab orders at this time in morning rounds as pt unresponsive post trach placement with team plan to reach out to transplant team.     Last PT rx:      11/25/22 0054   Appointment Info   Signing Clinician's Name / Credentials (PT) Brittany Dressler, PT   Living Environment   People in Home spouse   Current Living Arrangements house   Living Environment Comments From chart review, pt resides at home w/ spouse. Pt limited historian.   Self-Care   Usual Activity Tolerance moderate   Current Activity Tolerance poor   Equipment Currently Used at Home none   Activity/Exercise/Self-Care Comment Per chart review, independent w/ all ADLs/IADLs at baseline no AD, works from home.   General Information   Onset of Illness/Injury or Date of Surgery 11/12/22   Referring Physician Héctor Monge MD   Patient/Family Therapy Goals Statement (PT) To go home.   Pertinent History of Current Problem (include personal factors and/or comorbidities that impact the POC) PEA arrest and respiratory failure (now 6L NC), influenza, LORETTA on CRRT, septic shock with MODS, R pneumothorax, B pleural effusions, PNA, ESLD given CARRILLO s/p liver transplant.   Existing Precautions/Restrictions fall  (CRRT)   Cognition   Affect/Mental Status (Cognition) WFL   Follows Commands (Cognition) follows one-step commands;delayed response/completion   Pain Assessment   Patient Currently in Pain No   Posture    Posture Comments Impaired given deconditioning.   Range of Motion (ROM)   ROM Comment Impaired given deconditioning.   Strength (Manual Muscle Testing)   Strength Comments 6 LPM NC, grossly weakened with pt lift dependent, fair head  control, poor trunk control, DF >= 3/5 B, hip abd/add 2/5 B. Weaker L grasp than R - pt able to pull up to sit up mildly once hands placed.   Transfers   Comment, (Transfers) Lindsey bed-chair with OT - appropriate.   Gait/Stairs (Locomotion)   Distance in Feet (Required for LE Total Joints) Non-amb.   Balance   Balance Comments Total assist unsupported sitting balance with fair head control. Supported sitting balance with L lean. Vitals stable with mild SOB with brief rest break.   Sensory Examination   Sensory Perception Comments WFL   Coordination   Coordination Comments WFL   Muscle Tone   Muscle Tone Comments WFL   Clinical Impression   Criteria for Skilled Therapeutic Intervention Yes, treatment indicated   PT Diagnosis (PT) Impaired mobility impaired   Influenced by the following impairments Impaired strength, ROM, actiivty tolerance, balance.   Functional limitations due to impairments Impaired independent mobility & living   Clinical Presentation (PT Evaluation Complexity) Evolving/Changing   Clinical Presentation Rationale Fragile status   Clinical Decision Making (Complexity) moderate complexity   Planned Therapy Interventions (PT) balance training;bed mobility training;gait training;home exercise program;motor coordination training;neuromuscular re-education;patient/family education;postural re-education;stair training;strengthening;stretching;transfer training;progressive activity/exercise;risk factor education;home program guidelines;wheelchair management/propulsion training   Anticipated Equipment Needs at Discharge (PT) gait belt;walker, rolling;hospital bed;lift device;shower chair;commode chair   Risk & Benefits of therapy have been explained evaluation/treatment results reviewed;care plan/treatment goals reviewed;risks/benefits reviewed;participants included;patient;spouse/significant other;current/potential barriers reviewed;participants voiced agreement with care plan   PT Total Evaluation Time    PT Eval, Moderate Complexity Minutes (80981) 5   Physical Therapy Goals   PT Frequency 5x/week   PT Predicted Duration/Target Date for Goal Attainment 12/09/22   PT Goals Bed Mobility;Gait;Transfers;PT Goal 1   PT: Bed Mobility Minimal assist;Supine to/from sit;Rolling   PT: Transfers Moderate assist;Sit to/from stand;Bed to/from chair;Assistive device   PT: Gait Moderate assist;10 feet;Assistive device  (With O2 >= 88% on room air)   PT: Goal 1 Kelin unsupported sitting balance   Interventions   Interventions Quick Adds Neuromuscular Re-ed;Therapeutic Procedure   Therapeutic Procedure/Exercise   Group Therapeutic Procedures Minutes (22129) 8   Symptoms Noted During/After Treatment fatigue   Treatment Detail/Skilled Intervention PT instructed pt and wife in APs, hip abd/add, SAQs, glut sets for HEP with good pt teach back with SBA for amplitude and technique cues, rapid onset neuromuscular fatigue.   Neuromuscular Re-education   Neuromuscular Re-Education Minutes (74856) 15   Symptoms Noted During/After Treatment fatigue   Treatment Detail/Skilled Intervention Pt agreeable to PT: Total assist lean forward to midline, Total assist unsupported midline with pt tendency to lean back and L with fair head control with postural control and weight-shifting cues, 3 bouts each lasting longer than the last wtih less posterior resistance, 1-2min bouts iwth mild SOB, vitals stable on 6L NC, intermiittent therapeutic rest breaks. Dependent repo in chair via sling, pillows for weak arms and to limit L lean. Finished with RN handoff.   PT Discharge Planning   PT Plan PT: strength, activity tolerance, balance progressions.   PT Discharge Recommendation (DC Rec) Transitional Care Facility   PT Rationale for DC Rec Pt below independent baseline, now Ax2 lift dependent - rehab warranted pending medical stability.   PT Brief overview of current status Total assist sitting balance with fair head control, LE MMT 2-3/5 MMT, R grasp better  than L, 6L NC vitals stable, on CRRT during PT.   Total Session Time   Timed Code Treatment Minutes 15   Total Session Time (sum of timed and untimed services) 28

## 2022-11-30 NOTE — PLAN OF CARE
Occupational Therapy Discharge Summary    Reason for therapy discharge:    Change in medical status.    Progress towards therapy goal(s). See goals on Care Plan in [facility] electronic health record for goal details.  Goals not met.  Barriers to achieving goals:   limited tolerance for therapy.    Therapy recommendation(s):    Checked on patient >x3 times, pt not appropriate for therapy d/t medical status. Please reorder when medically ready and able to participate in therapy

## 2022-11-30 NOTE — PLAN OF CARE
Major Shift Events:  Mostly unresponsive t/o the night, only withdraws from pain on his L side only, none on R, pupils sluggish but reactive. Nares, upper lip lesions scabbed and putting ointment on, did discuss w/Dr. Turner about oral meds for them but will need to discuss w/ID and [facility] transplant team for other med options. Had dialysis on evenings and tolerated but BP soft but w/in parameters.  New trach has some bleeding around site w/mepilex in place under trach flange; on full vent and LS coarse. CT w/no output overnight.  Plan: Possible CT pulled today.   For vital signs and complete assessments, please see documentation flowsheets.

## 2022-12-01 ENCOUNTER — APPOINTMENT (OUTPATIENT)
Dept: INTERVENTIONAL RADIOLOGY/VASCULAR | Facility: CLINIC | Age: 57
DRG: 004 | End: 2022-12-01
Attending: RADIOLOGY
Payer: COMMERCIAL

## 2022-12-01 ENCOUNTER — APPOINTMENT (OUTPATIENT)
Dept: CT IMAGING | Facility: CLINIC | Age: 57
DRG: 004 | End: 2022-12-01
Attending: PHYSICIAN ASSISTANT
Payer: COMMERCIAL

## 2022-12-01 LAB
% LINING CELLS, BODY FLUID: 5 %
ALBUMIN BODY FLUID SOURCE: NORMAL
ALBUMIN FLD-MCNC: 0.2 G/DL
ANION GAP SERPL CALCULATED.3IONS-SCNC: 13 MMOL/L (ref 3–14)
APPEARANCE FLD: ABNORMAL
BACTERIA PLR CULT: NO GROWTH
BUN SERPL-MCNC: 103 MG/DL (ref 7–30)
CALCIUM SERPL-MCNC: 8.1 MG/DL (ref 8.5–10.1)
CELL COUNT BODY FLUID SOURCE: ABNORMAL
CHLORIDE BLD-SCNC: 104 MMOL/L (ref 94–109)
CO2 SERPL-SCNC: 22 MMOL/L (ref 20–32)
COLOR FLD: ABNORMAL
CREAT SERPL-MCNC: 4.1 MG/DL (ref 0.66–1.25)
ERYTHROCYTE [DISTWIDTH] IN BLOOD BY AUTOMATED COUNT: 19.6 % (ref 10–15)
GFR SERPL CREATININE-BSD FRML MDRD: 16 ML/MIN/1.73M2
GLUCOSE BLD-MCNC: 220 MG/DL (ref 70–99)
GLUCOSE BLDC GLUCOMTR-MCNC: 134 MG/DL (ref 70–99)
GLUCOSE BLDC GLUCOMTR-MCNC: 180 MG/DL (ref 70–99)
GLUCOSE BLDC GLUCOMTR-MCNC: 196 MG/DL (ref 70–99)
GLUCOSE BLDC GLUCOMTR-MCNC: 212 MG/DL (ref 70–99)
GLUCOSE BLDC GLUCOMTR-MCNC: 213 MG/DL (ref 70–99)
GLUCOSE BLDC GLUCOMTR-MCNC: 225 MG/DL (ref 70–99)
GRAM STAIN RESULT: NORMAL
HCT VFR BLD AUTO: 29.3 % (ref 40–53)
HGB BLD-MCNC: 9.2 G/DL (ref 13.3–17.7)
INR PPP: 1.17 (ref 0.85–1.15)
LYMPHOCYTES NFR FLD MANUAL: 30 %
MCH RBC QN AUTO: 33.7 PG (ref 26.5–33)
MCHC RBC AUTO-ENTMCNC: 31.4 G/DL (ref 31.5–36.5)
MCV RBC AUTO: 107 FL (ref 78–100)
MONOS+MACROS NFR FLD MANUAL: 8 %
NEUTS BAND NFR FLD MANUAL: 57 %
PLATELET # BLD AUTO: 117 10E3/UL (ref 150–450)
POTASSIUM BLD-SCNC: 3.7 MMOL/L (ref 3.4–5.3)
PROT FLD-MCNC: 0.6 G/DL
PROTEIN BODY FLUID SOURCE: NORMAL
RBC # BLD AUTO: 2.73 10E6/UL (ref 4.4–5.9)
SODIUM SERPL-SCNC: 139 MMOL/L (ref 133–144)
WBC # BLD AUTO: 19.7 10E3/UL (ref 4–11)
WBC # FLD AUTO: 325 /UL

## 2022-12-01 PROCEDURE — 999N000040 HC STATISTIC CONSULT NO CHARGE VASC ACCESS

## 2022-12-01 PROCEDURE — 84157 ASSAY OF PROTEIN OTHER: CPT | Performed by: INTERNAL MEDICINE

## 2022-12-01 PROCEDURE — 89051 BODY FLUID CELL COUNT: CPT | Performed by: INTERNAL MEDICINE

## 2022-12-01 PROCEDURE — 200N000001 HC R&B ICU

## 2022-12-01 PROCEDURE — 37200 TRANSCATHETER BIOPSY: CPT

## 2022-12-01 PROCEDURE — 250N000009 HC RX 250: Performed by: PHYSICIAN ASSISTANT

## 2022-12-01 PROCEDURE — 94640 AIRWAY INHALATION TREATMENT: CPT | Mod: 76

## 2022-12-01 PROCEDURE — 80051 ELECTROLYTE PANEL: CPT | Performed by: INTERNAL MEDICINE

## 2022-12-01 PROCEDURE — C1769 GUIDE WIRE: HCPCS

## 2022-12-01 PROCEDURE — 85610 PROTHROMBIN TIME: CPT | Performed by: INTERNAL MEDICINE

## 2022-12-01 PROCEDURE — 0FD03ZX EXTRACTION OF LIVER, PERCUTANEOUS APPROACH, DIAGNOSTIC: ICD-10-PCS | Performed by: RADIOLOGY

## 2022-12-01 PROCEDURE — 90937 HEMODIALYSIS REPEATED EVAL: CPT

## 2022-12-01 PROCEDURE — 88305 TISSUE EXAM BY PATHOLOGIST: CPT | Mod: TC | Performed by: INTERNAL MEDICINE

## 2022-12-01 PROCEDURE — 88313 SPECIAL STAINS GROUP 2: CPT | Mod: TC | Performed by: INTERNAL MEDICINE

## 2022-12-01 PROCEDURE — 87205 SMEAR GRAM STAIN: CPT | Performed by: INTERNAL MEDICINE

## 2022-12-01 PROCEDURE — 250N000013 HC RX MED GY IP 250 OP 250 PS 637: Performed by: PHYSICIAN ASSISTANT

## 2022-12-01 PROCEDURE — 272N000500 HC NEEDLE CR2

## 2022-12-01 PROCEDURE — 99291 CRITICAL CARE FIRST HOUR: CPT | Performed by: INTERNAL MEDICINE

## 2022-12-01 PROCEDURE — 85027 COMPLETE CBC AUTOMATED: CPT | Performed by: INTERNAL MEDICINE

## 2022-12-01 PROCEDURE — 75970 VASCULAR BIOPSY: CPT

## 2022-12-01 PROCEDURE — 999N000157 HC STATISTIC RCP TIME EA 10 MIN

## 2022-12-01 PROCEDURE — 82042 OTHER SOURCE ALBUMIN QUAN EA: CPT | Performed by: INTERNAL MEDICINE

## 2022-12-01 PROCEDURE — 250N000009 HC RX 250: Performed by: INTERNAL MEDICINE

## 2022-12-01 PROCEDURE — 94640 AIRWAY INHALATION TREATMENT: CPT

## 2022-12-01 PROCEDURE — 999N000185 HC STATISTIC TRANSPORT TIME EA 15 MIN

## 2022-12-01 PROCEDURE — 90935 HEMODIALYSIS ONE EVALUATION: CPT | Performed by: INTERNAL MEDICINE

## 2022-12-01 PROCEDURE — 99233 SBSQ HOSP IP/OBS HIGH 50: CPT | Performed by: PHYSICIAN ASSISTANT

## 2022-12-01 PROCEDURE — 255N000002 HC RX 255 OP 636: Performed by: RADIOLOGY

## 2022-12-01 PROCEDURE — 250N000011 HC RX IP 250 OP 636: Performed by: PHYSICIAN ASSISTANT

## 2022-12-01 PROCEDURE — 272N000566 HC SHEATH CR3

## 2022-12-01 PROCEDURE — 88112 CYTOPATH CELL ENHANCE TECH: CPT | Mod: TC | Performed by: INTERNAL MEDICINE

## 2022-12-01 PROCEDURE — 87070 CULTURE OTHR SPECIMN AEROBIC: CPT | Performed by: INTERNAL MEDICINE

## 2022-12-01 PROCEDURE — 36011 PLACE CATHETER IN VEIN: CPT

## 2022-12-01 PROCEDURE — 250N000011 HC RX IP 250 OP 636: Performed by: RADIOLOGY

## 2022-12-01 PROCEDURE — 250N000009 HC RX 250: Performed by: RADIOLOGY

## 2022-12-01 PROCEDURE — 70450 CT HEAD/BRAIN W/O DYE: CPT

## 2022-12-01 PROCEDURE — 0W9G3ZZ DRAINAGE OF PERITONEAL CAVITY, PERCUTANEOUS APPROACH: ICD-10-PCS | Performed by: RADIOLOGY

## 2022-12-01 PROCEDURE — 250N000012 HC RX MED GY IP 250 OP 636 PS 637: Performed by: INTERNAL MEDICINE

## 2022-12-01 PROCEDURE — 272N000196 HC ACCESSORY CR5

## 2022-12-01 PROCEDURE — 49083 ABD PARACENTESIS W/IMAGING: CPT

## 2022-12-01 PROCEDURE — 272N000116 HC CATH CR1

## 2022-12-01 PROCEDURE — 94003 VENT MGMT INPAT SUBQ DAY: CPT

## 2022-12-01 PROCEDURE — 87075 CULTR BACTERIA EXCEPT BLOOD: CPT | Performed by: INTERNAL MEDICINE

## 2022-12-01 PROCEDURE — 272N000496 HC NEEDLE CR15

## 2022-12-01 RX ORDER — NALOXONE HYDROCHLORIDE 0.4 MG/ML
0.4 INJECTION, SOLUTION INTRAMUSCULAR; INTRAVENOUS; SUBCUTANEOUS
Status: DISCONTINUED | OUTPATIENT
Start: 2022-12-01 | End: 2022-12-01

## 2022-12-01 RX ORDER — HEPARIN SODIUM 200 [USP'U]/100ML
3 INJECTION, SOLUTION INTRAVENOUS CONTINUOUS PRN
Status: DISCONTINUED | OUTPATIENT
Start: 2022-12-01 | End: 2022-12-01

## 2022-12-01 RX ORDER — NALOXONE HYDROCHLORIDE 0.4 MG/ML
0.2 INJECTION, SOLUTION INTRAMUSCULAR; INTRAVENOUS; SUBCUTANEOUS
Status: DISCONTINUED | OUTPATIENT
Start: 2022-12-01 | End: 2022-12-01

## 2022-12-01 RX ORDER — IOPAMIDOL 612 MG/ML
100 INJECTION, SOLUTION INTRAVASCULAR ONCE
Status: COMPLETED | OUTPATIENT
Start: 2022-12-01 | End: 2022-12-01

## 2022-12-01 RX ORDER — FENTANYL CITRATE 50 UG/ML
25-50 INJECTION, SOLUTION INTRAMUSCULAR; INTRAVENOUS EVERY 5 MIN PRN
Status: DISCONTINUED | OUTPATIENT
Start: 2022-12-01 | End: 2022-12-01

## 2022-12-01 RX ORDER — FLUMAZENIL 0.1 MG/ML
0.2 INJECTION, SOLUTION INTRAVENOUS
Status: ACTIVE | OUTPATIENT
Start: 2022-12-01 | End: 2022-12-02

## 2022-12-01 RX ADMIN — INSULIN ASPART 2 UNITS: 100 INJECTION, SOLUTION INTRAVENOUS; SUBCUTANEOUS at 00:02

## 2022-12-01 RX ADMIN — Medication 1 DROP: at 08:34

## 2022-12-01 RX ADMIN — Medication 40 MG: at 08:34

## 2022-12-01 RX ADMIN — HYDROCORTISONE SODIUM SUCCINATE 50 MG: 100 INJECTION, POWDER, FOR SOLUTION INTRAMUSCULAR; INTRAVENOUS at 18:06

## 2022-12-01 RX ADMIN — MIDAZOLAM 2 MG: 1 INJECTION INTRAMUSCULAR; INTRAVENOUS at 14:38

## 2022-12-01 RX ADMIN — HEPARIN SODIUM 3 UNITS: 200 INJECTION, SOLUTION INTRAVENOUS at 14:47

## 2022-12-01 RX ADMIN — ACYCLOVIR: 50 OINTMENT TOPICAL at 22:18

## 2022-12-01 RX ADMIN — CHLORHEXIDINE GLUCONATE 0.12% ORAL RINSE 15 ML: 1.2 LIQUID ORAL at 08:34

## 2022-12-01 RX ADMIN — TACROLIMUS 0.5 MG: 5 CAPSULE ORAL at 20:08

## 2022-12-01 RX ADMIN — HEPARIN SODIUM 5000 UNITS: 5000 INJECTION, SOLUTION INTRAVENOUS; SUBCUTANEOUS at 22:18

## 2022-12-01 RX ADMIN — Medication 1 DROP: at 22:18

## 2022-12-01 RX ADMIN — HEPARIN SODIUM 5000 UNITS: 5000 INJECTION, SOLUTION INTRAVENOUS; SUBCUTANEOUS at 06:17

## 2022-12-01 RX ADMIN — Medication 15 ML: at 08:34

## 2022-12-01 RX ADMIN — INSULIN ASPART 3 UNITS: 100 INJECTION, SOLUTION INTRAVENOUS; SUBCUTANEOUS at 03:52

## 2022-12-01 RX ADMIN — INSULIN ASPART 3 UNITS: 100 INJECTION, SOLUTION INTRAVENOUS; SUBCUTANEOUS at 20:13

## 2022-12-01 RX ADMIN — LACTULOSE 10 G: 20 SOLUTION ORAL at 22:17

## 2022-12-01 RX ADMIN — LACTULOSE 10 G: 20 SOLUTION ORAL at 15:55

## 2022-12-01 RX ADMIN — TACROLIMUS 0.5 MG: 5 CAPSULE ORAL at 08:34

## 2022-12-01 RX ADMIN — INSULIN ASPART 4 UNITS: 100 INJECTION, SOLUTION INTRAVENOUS; SUBCUTANEOUS at 12:33

## 2022-12-01 RX ADMIN — IPRATROPIUM BROMIDE AND ALBUTEROL SULFATE 3 ML: .5; 3 SOLUTION RESPIRATORY (INHALATION) at 19:16

## 2022-12-01 RX ADMIN — HYDROCORTISONE SODIUM SUCCINATE 50 MG: 100 INJECTION, POWDER, FOR SOLUTION INTRAMUSCULAR; INTRAVENOUS at 12:33

## 2022-12-01 RX ADMIN — Medication: at 16:18

## 2022-12-01 RX ADMIN — Medication 1 DROP: at 15:54

## 2022-12-01 RX ADMIN — CHLORHEXIDINE GLUCONATE 0.12% ORAL RINSE 15 ML: 1.2 LIQUID ORAL at 19:59

## 2022-12-01 RX ADMIN — HYDROCORTISONE SODIUM SUCCINATE 50 MG: 100 INJECTION, POWDER, FOR SOLUTION INTRAMUSCULAR; INTRAVENOUS at 06:17

## 2022-12-01 RX ADMIN — IPRATROPIUM BROMIDE AND ALBUTEROL SULFATE 3 ML: .5; 3 SOLUTION RESPIRATORY (INHALATION) at 07:20

## 2022-12-01 RX ADMIN — INSULIN ASPART 3 UNITS: 100 INJECTION, SOLUTION INTRAVENOUS; SUBCUTANEOUS at 08:35

## 2022-12-01 RX ADMIN — ACYCLOVIR: 50 OINTMENT TOPICAL at 15:55

## 2022-12-01 RX ADMIN — HYDROCORTISONE SODIUM SUCCINATE 50 MG: 100 INJECTION, POWDER, FOR SOLUTION INTRAMUSCULAR; INTRAVENOUS at 00:03

## 2022-12-01 RX ADMIN — METOPROLOL TARTRATE 5 MG: 5 INJECTION INTRAVENOUS at 10:46

## 2022-12-01 RX ADMIN — LIDOCAINE HYDROCHLORIDE 15 ML: 10 INJECTION, SOLUTION INFILTRATION; PERINEURAL at 15:17

## 2022-12-01 RX ADMIN — ACYCLOVIR: 50 OINTMENT TOPICAL at 06:18

## 2022-12-01 RX ADMIN — IPRATROPIUM BROMIDE AND ALBUTEROL SULFATE 3 ML: .5; 3 SOLUTION RESPIRATORY (INHALATION) at 11:45

## 2022-12-01 RX ADMIN — IOPAMIDOL 10 ML: 612 INJECTION, SOLUTION INTRAVENOUS at 15:14

## 2022-12-01 RX ADMIN — FENTANYL CITRATE 25 MCG: 50 INJECTION, SOLUTION INTRAMUSCULAR; INTRAVENOUS at 14:44

## 2022-12-01 RX ADMIN — INSULIN GLARGINE 10 UNITS: 100 INJECTION, SOLUTION SUBCUTANEOUS at 08:36

## 2022-12-01 ASSESSMENT — ACTIVITIES OF DAILY LIVING (ADL)
ADLS_ACUITY_SCORE: 49
ADLS_ACUITY_SCORE: 53
ADLS_ACUITY_SCORE: 49
ADLS_ACUITY_SCORE: 53

## 2022-12-01 NOTE — IR NOTE
Interventional Radiology Intra-procedural Nursing Note    Patient Name: Blanco Osborne  Medical Record Number: [id number]  Today's Date: December 1, 2022    Procedure: Transjugular liver biopsy, Paracentesis, moderate sedation  Start time: 1439  End time: 1312  Report provided to: Martha GREEN  Patient depart time and location: 1523 to ICU    Note: Patient entered Interventional Radiology Suite number 2 via cart. Patient awake, alert and oriented. Assisted onto procedural table in supine position. Prepped and draped.  Dr. Noel in room. Time out and procedure started. Vital signs stable. Telemetry reading sinus tachycardia.    Procedure well tolerated by patient without complications. Procedure end with debrief by Dr. Noel.  Manual pressure applied until hemostasis achieved. Gauze/tegaderm dressing applied to right neck  interventional procedure access site.    3.3L Drained during paracentesis.   Administered medication totals:  Lidocaine 1% 15 mL Intradermal  Versed 2 mg IVP  Fentanyl 25 mcg IVP    Last dose of sedation administered at 1444

## 2022-12-01 NOTE — PROGRESS NOTES
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  Likely secondary to acute respiratory failure and COPD exacerbation and parainfluenza and strep pneumo pneumonias. He is down to 30% and +8 PEEP but has failed extubation twice and plans are for a trach/PEG early this week. He also has acute renal failure and is dialysis dependent.Mental status has been improving and was able to participate in a conversation about trach on Sunday but mental status is deteriorated since.  Got 10 mg of oxycodone on Sunday and then received some fentanyl and midazolam yesterday.  During PEG tube they noticed ascitic fluid and drained 3 L.  No studies were performed.  Also had elevated ammonia.  Started on lactulose.  A little bit more alert but not interactive      Assessment and plan :     Blanco Osborne IS a 57 year old male admitted on 11/12/2022 for acute respiratory failure.   I have personally reviewed the daily labs, imaging studies, cultures and discussed the case with referring physician and consulting physicians.     My assessment and plan by system for this patient is as follows:    Neurology/Psychiatry:   Possible anoxic  "encephalopathy.  Alhough has been intermittently awake and following commands, long term CNS prognosis is guarded. MRI  11/17 looks \"worse than what we have seen from him thus far\".  Persistent somnolence after receiving sedation over the weekend and on Monday.  Have discovered hepatic encephalopathy.  Lactulose has been started as of last night but not a ton of stool output yet...  -Hold all sedatives.  -Continue lactulose.  -Will discuss them with neurology since they had seen him earlier in this hospitalization.    Cardiovascular:   1.  Status post PEA arrest 20 minutes of CPR.  Not a primary cardiac event.  Most likely secondary to respiratory failure. FRANKLIN was normal.  Was in shock which is now resolved.  Lipids and hemoglobin A1c were both within normal limits.      Pulmonary/Ventilator Management:   Acute respiratory failure and ARDS secondary to viral and bacterial pneumonia -improved.  Reintubated twice secondary to same as well as weakness encephalopathy and inability to manage secretions.  Long talk on 11/27 with Dr. Jones resulted in a decision to proceed with tracheostomy.  Also with pneumothorax status post chest tube.  No airleak seen today and chest tube with minimal output. CT on water seal for many days and removed on November 30..  Tracheostomy was done on 1129.  Had to be urgently replaced because of malfunctioning cuff.  Exchanged for an XLT.  -Pressure support mode indefinitely    GI and Nutrition :   1.  Status post liver transplant.  On chronic tacrolimus.  Follows at the [facility].  We have been touching base with them regularly.  2.  Tolerating enteral nutrition   3.  Cirrhosis post transplant.  Have discussed extensively with transplant service who noted that patient had some evidence of fibrosis in 2017, so this is likely progression of same..  Treatment for that would be strict alcohol abstinence and continued immunosuppression and working on metabolic syndrome.  Lost to follow-up for " 2 years.  They think cirrhosis is less likely rejection and more likely rated related to the aforementioned factors.  -Liver biopsy.  I have confirmed they are able to do a transjugular biopsy here at [facility] so we will do this when able.  -Paracentesis.  Studies have been ordered..    Renal/Fluids/Electrolytes:   1.  Acute renal failure now dialysis dependent.  No indication as of yet return of renal function.  Intermittent dialysis ongoing    Infectious Disease:   1.  Streptococcal pneumonia as well as parainfluenza as well as possible Aspergillus thought less likely by infectious disease consultant.  Also herpes labialis infection.  Completed antibiotics.  -Acyclovir for 21-day course  -Tacrolimus being resumed.    Endocrine:   1. Glucoses ok on coverage.  On hydrocortisone.  -Continue hydrocortisone for now.  Tacrolimus has been resumed and will wean hydrocortisone when this is therapeutic.    Hematology/Oncology:   1.  Anemia of critical illness and also secondary to kidney disease.  Thrombocytopenia unclear etiology but stable.  Possibly related to cirrhosis  -Continue DVT prophylaxis and defer further work-up at this time.     IV/Access:   1. Venous access -   3.  Plan  - central access required and necessary.  We will not remove today.      ICU Prophylaxis:   1. DVT: Hep Subq/ LMWH/mechanical  2. VAP: HOB 30 degrees, chlorhexidine rinse  3. Stress Ulcer: PPI/H2 blocker  4. Restraints: Nonviolent soft two point restraints required and necessary for patient safety and continued cares and good effect as patient continues to pull at necessary lines, tubes despite education and distraction. Will readdress daily.   5. IV Access - central access required and necessary for continued patient cares.  Will discuss daily with nursing  6. Feeding - enteral   7. Family Update: Bedside  8. Disposition - ICU care       Key goals for next 24 hours:   1.  Liver biopsy and paracentesis with studies.  3.  Continue off  sedation and continue on lactulose   4.  Pressure support trial  5.  Disposition planning.         Key Medications:       - MEDICATION INSTRUCTIONS for Dialysis Patients -   Does not apply See Admin Instructions     sodium chloride 0.9%  250 mL Intravenous Once in dialysis/CRRT     sodium chloride 0.9%  300 mL Hemodialysis Machine Once     acyclovir   Topical Q8H     artificial tears  1 drop Both Eyes TID     chlorhexidine  15 mL Mouth/Throat Q12H     heparin ANTICOAGULANT  5,000 Units Subcutaneous Q8H     heparin lock flush  5-20 mL Intracatheter Q24H     hydrocortisone sodium succinate PF  50 mg Intravenous Q6H     insulin aspart  1-12 Units Subcutaneous Q4H     insulin glargine  10 Units Subcutaneous QAM AC     ipratropium - albuterol 0.5 mg/2.5 mg/3 mL  3 mL Nebulization 4x daily     lactulose  10 g Oral TID     multivitamins w/minerals  15 mL Per Feeding Tube Daily     - MEDICATION INSTRUCTIONS -   Does not apply Once     pantoprazole  40 mg Per Feeding Tube QAM AC    Or     pantoprazole  40 mg Intravenous QAM AC     polyethylene glycol  17 g Oral Daily     senna-docusate  1 tablet Oral BID    Or     senna-docusate  2 tablet Oral BID     sodium chloride (PF)  10-40 mL Intracatheter Q8H     tacrolimus  0.5 mg Oral or Feeding Tube BID       dextrose       - MEDICATION INSTRUCTIONS -       - MEDICATION INSTRUCTIONS -       norepinephrine       phenylephrine Stopped (11/29/22 0500)     sodium chloride Stopped (12/01/22 1000)     sodium chloride 0 mL/hr at 11/22/22 0742     vasopressin                 Physical Examination:   Temp:  [97.8  F (36.6  C)-99.3  F (37.4  C)] 98.1  F (36.7  C)  Pulse:  [] 105  Resp:  [13-29] 20  BP: ()/() 114/75  FiO2 (%):  [30 %] 30 %  SpO2:  [95 %-100 %] 98 %           Intake/Output Summary (Last 24 hours) at 12/1/2022 1339  Last data filed at 12/1/2022 1200  Gross per 24 hour   Intake 1365 ml   Output 900 ml   Net 465 ml         Wt Readings from Last 4 Encounters:    12/01/22 90.7 kg (199 lb 15.3 oz)   10/07/19 137.5 kg (303 lb 3.2 oz)   10/04/19 131.5 kg (290 lb)   02/20/19 140.4 kg (309 lb 9.6 oz)     BP - Mean:  [] 84  Vent Mode: CPAP/PS  (Continuous positive airway pressure with Pressure Support)  FiO2 (%): 30 %  Resp Rate (Set): 16 breaths/min  Tidal Volume (Set, mL): 450 mL  PEEP (cm H2O): 5 cmH2O  Pressure Support (cm H2O): 5 cmH2O  Resp: 20    Recent Labs   Lab 11/30/22  0955 11/26/22  1343 11/26/22  1121 11/25/22  1947   O2PER 30 70 60 45       GEN: Intubated, eyes open.  Intermittent hiccuping or gagging  HEENT: head ncat, sclera anicteric, OP patent, trachea midline, healing ulcers in nostrils and mouth, no bloody drainage or purulence from trach tube site  PULM: Right-sided chest tube bandages clean, dried blood on tracheostomy site but no active bleeding pulling good tidal volumes on pressure support with occasional gagging/CV/COR: RRR S1S2 no gallop,  No rub, no murmur  ABD: soft nontender, nondistended, PEG tube site looks clean  EXT: Anasarca  NEURO: Eyes mostly open.  Moves spontaneously.  Does not interact.  SKIN: no obvious rash; no cyanosis or mottling   LINES: clean, dry intact dialysis catheter         Data:   All data and imaging reviewed     ROUTINE ICU LABS (Last four results)  CMP  Recent Labs   Lab 12/01/22  1231 12/01/22  0823 12/01/22  0350 12/01/22  0001 11/30/22  1201 11/30/22  0807 11/29/22  0737 11/29/22  0439 11/28/22  0508 11/28/22  0504 11/27/22  0921 11/27/22  0447 11/26/22  1524 11/26/22  1121 11/26/22  0411 11/26/22  0403   NA  --   --   --   --   --  140  --  138  --  140  --  139  --  137  --  138   POTASSIUM  --   --   --   --   --  4.3  --  4.4  --  4.3  --  4.1  --  4.2  --  4.3   CHLORIDE  --   --   --   --   --  105  --  105  --  105  --  107  --  106  --  105   CO2  --   --   --   --   --  24  --  24  --  23  --  24  --  23  --  27   ANIONGAP  --   --   --   --   --  11  --  9  --  12  --  8  --  8  --  6   * 196*  213* 180*   < > 143*   < > 149*   < > 245*   < > 175*   < > 123*   < > 120*   BUN  --   --   --   --   --  73*  --  100*  --  83*  --  56*  --  37*  --  40*   CR  --   --   --   --   --  2.99*  --  3.74*  --  2.86*  --  2.02*  --  1.29*  --  1.28*   GFRESTIMATED  --   --   --   --   --  23*  --  18*  --  25*  --  38*  --  64  --  65   KELLY  --   --   --   --   --  8.0*  --  7.9*  --  7.9*  --  7.7*  --  8.2*  --  8.7   MAG  --   --   --   --   --   --   --  2.3  --   --   --  2.1  --  1.9  --  1.9   PHOS  --   --   --   --   --   --   --  4.2  --  3.7  --  4.3  --  4.5  --  4.5   PROTTOTAL  --   --   --   --   --   --   --  4.9*  --  5.1*  --  5.0*  --  5.7*  --  6.0*   ALBUMIN  --   --   --   --   --   --   --  1.9*  --  2.0*  --  2.0*  --  2.3*  --  2.1*   BILITOTAL  --   --   --   --   --   --   --  0.9  --  1.0  --  1.1  --   --   --  1.6*   ALKPHOS  --   --   --   --   --   --   --  189*  --  250*  --  177*  --   --   --  210*   AST  --   --   --   --   --   --   --  19  --  23  --  33  --   --   --  39   ALT  --   --   --   --   --   --   --  24  --  30  --  29  --   --   --  37    < > = values in this interval not displayed.     CBC  Recent Labs   Lab 11/30/22  0807 11/29/22  0439 11/28/22  0504 11/27/22  0447   WBC 10.7 8.9 13.9* 15.2*   RBC 2.33* 2.19* 2.47* 2.48*   HGB 7.9* 7.4* 8.3* 8.3*   HCT 24.1* 22.3* 26.0* 26.5*   * 102* 105* 107*   MCH 33.9* 33.8* 33.6* 33.5*   MCHC 32.8 33.2 31.9 31.3*   RDW 19.7* 19.0* 19.2* 18.9*   PLT 66* 57* 76* 99*     INR  Recent Labs   Lab 11/29/22  1402   INR 1.26*     Arterial Blood Gas  Recent Labs   Lab 11/30/22  0955 11/26/22  1343 11/26/22  1121 11/25/22  1947   O2PER 30 70 60 45       All cultures:  No results for input(s): CULT in the last 168 hours.      Laura Fernandes MD    Billing: This patient is critically ill: Yes. Total critical care time today 55 min.

## 2022-12-01 NOTE — PROGRESS NOTES
[facility]    Stroke Progress Note    Interval Events  Asked to reevaluate patient for encephalopathy. Mental status had been good on the weekend and patient was alert enough to participate in procedure consent discussion. We last saw patient 11/21/22. At that time he was nodding yes/no appropriately.  Since then he has had trach and peg, and increasing ammonia yesterday and the day before as high as 123. He had some oxycodone on the 28th. Today he had versed and fentanyl for paracentesis and transjugular liver biopsy.    HPI Summary  57M who presented to the ED 11/12 after witnessed PEA arrest. He has a PMH of nonalcoholic fatty liver disease s/o transplant (9/2016) and cirrhosis.    Stroke Evaluation Summarized     MRI/Head CT MRI brain shows multifocal punctate areas of acute to subacute ischemia   Intracranial Vasculature None yet   Cervical Vasculature None yet      Echocardiogram EF 55-60%, mild TR mild AR   EKG/Telemetry Sinus keon, prolonged QT   Other Testing Not Applicable       LDL  No lab value available in past 30 days   A1C  11/19/2022: 4.7 %   Troponin No lab value available in past 48 hrs         Impression/Plan   1. Encephalopathy likely multifactorial and worsened with new hyperammonemia. Fortunately MRI brain showed no obvious anoxic injury on 11/17 and his mental status improved significantly in late Nov. Had 16 hrs of EEG from 11/13 to 11/14 showing no seizures but generalized burst suppression was seen.   - Suspected related to hyperammonemia. Will check head CT to eval for any edema related to hyperammonemia. He was also transitioned over from CRRT to HD. However, no new focal deficits on exam to suggest new stroke so do not feel MRI needed at this time.  - Continue supportive care, lactulose. WBC also increased today, ?  Contributing infection- per pulmcrit     2. Multifocal small ischemic strokes  - Suspect cardioembolic due to cardiac arrest, although he's  "also been in afib this admission which could be source  - Never had head/neck vessel imaging this admission due to CRRT earlier on.      3. Acute hypoxic respiratory failure s/p trach  - Per pulmcrit team        Patient Follow-up    - final recommendation pending work-up    We will continue to follow.     Linda Martinez PA-C  Vascular Neurology    To page me or covering stroke neurology team member, click here: AMCOM   Choose \"On Call\" tab at top, then search dropdown box for \"Neurology Adult\", select location, press Enter, then look for stroke/neuro ICU/telestroke.  ______________________________________________________    Clinically Significant Risk Factors Present on Admission                  Medications   Scheduled Meds    - MEDICATION INSTRUCTIONS for Dialysis Patients -   Does not apply See Admin Instructions     sodium chloride 0.9%  250 mL Intravenous Once in dialysis/CRRT     sodium chloride 0.9%  300 mL Hemodialysis Machine Once     acyclovir   Topical Q8H     artificial tears  1 drop Both Eyes TID     chlorhexidine  15 mL Mouth/Throat Q12H     heparin ANTICOAGULANT  5,000 Units Subcutaneous Q8H     heparin lock flush  5-20 mL Intracatheter Q24H     hydrocortisone sodium succinate PF  50 mg Intravenous Q6H     insulin aspart  1-12 Units Subcutaneous Q4H     insulin glargine  10 Units Subcutaneous QAM AC     ipratropium - albuterol 0.5 mg/2.5 mg/3 mL  3 mL Nebulization 4x daily     lactulose  10 g Oral TID     multivitamins w/minerals  15 mL Per Feeding Tube Daily     pantoprazole  40 mg Per Feeding Tube QAM AC    Or     pantoprazole  40 mg Intravenous QAM AC     polyethylene glycol  17 g Oral Daily     senna-docusate  1 tablet Oral BID    Or     senna-docusate  2 tablet Oral BID     sodium chloride (PF)  10-40 mL Intracatheter Q8H     tacrolimus  0.5 mg Oral or Feeding Tube BID       Infusion Meds    dextrose       - MEDICATION INSTRUCTIONS -       - MEDICATION INSTRUCTIONS -       norepinephrine       " phenylephrine Stopped (11/29/22 0500)     sodium chloride Stopped (12/01/22 1000)     sodium chloride 0 mL/hr at 11/22/22 0742     vasopressin         PRN Meds  sodium chloride 0.9%, albuterol, dextrose, glucose **OR** dextrose **OR** glucagon, flumazenil, heparin lock flush, - MEDICATION INSTRUCTIONS -, - MEDICATION INSTRUCTIONS -, metoprolol, naloxone **OR** naloxone **OR** naloxone **OR** naloxone, ondansetron **OR** ondansetron, prochlorperazine **OR** prochlorperazine **OR** prochlorperazine, promethazine, sodium chloride (PF), sodium chloride (PF)       PHYSICAL EXAMINATION  Temp:  [97.8  F (36.6  C)-99.3  F (37.4  C)] 98.1  F (36.7  C)  Pulse:  [] 105  Resp:  [13-29] 15  BP: ()/() 113/77  FiO2 (%):  [30 %] 30 %  SpO2:  [95 %-100 %] 98 %      Neurologic  Mental Status:  unresponsive to clap/shout. Keeps eyes closed.  Cranial Nerves:  PERRL, equal but somewhat sluggish. Does not keep eyes open in order to check blink to threat. Face grossely symmetric when grimacing, perhaps slightly weaker on the R  Motor: spontaneously moves head from side to side Minimal spontaneous L elbow flexion similar to prior exams. Trace withdraw in RUE. Trace withdraw vs triple flexion to pain in the feet.  Reflexes:  Toes equivocal  Sensory: as above in motor section to noxious. Good quick grimace to pain in all 4 extremities  Coordination:  unable to test  Station/Gait:  deferred        Imaging  I personally reviewed all imaging; relevant findings per HPI.     Lab Results Data   CBC  Recent Labs   Lab 12/01/22  1303 11/30/22  0807 11/29/22  0439   WBC 19.7* 10.7 8.9   RBC 2.73* 2.33* 2.19*   HGB 9.2* 7.9* 7.4*   HCT 29.3* 24.1* 22.3*   * 66* 57*     Basic Metabolic Panel    Recent Labs   Lab 12/01/22  1549 12/01/22  1303 12/01/22  1231 11/30/22  1201 11/30/22  0807 11/29/22  0737 11/29/22  0439   NA  --  139  --   --  140  --  138   POTASSIUM  --  3.7  --   --  4.3  --  4.4   CHLORIDE  --  104  --   --  105   --  105   CO2  --  22  --   --  24  --  24   BUN  --  103*  --   --  73*  --  100*   CR  --  4.10*  --   --  2.99*  --  3.74*   * 220* 225*   < > 143*   < > 149*   KELLY  --  8.1*  --   --  8.0*  --  7.9*    < > = values in this interval not displayed.     Liver Panel  Recent Labs   Lab 11/29/22  0439 11/28/22  0504 11/27/22  0447   PROTTOTAL 4.9* 5.1* 5.0*   ALBUMIN 1.9* 2.0* 2.0*   BILITOTAL 0.9 1.0 1.1   ALKPHOS 189* 250* 177*   AST 19 23 33   ALT 24 30 29     INR    Recent Labs   Lab Test 12/01/22  1303 11/29/22  1402 11/14/22  1020   INR 1.17* 1.26* 1.50*      Lipid Profile    Recent Labs   Lab Test 11/20/22  1151 04/20/20  1157 08/01/19  1500 01/08/19  0840 10/31/17  1037   CHOL 121 161  --   --  134   HDL 22* 42  --   --  57   LDL 80 81  --   --  58   TRIG 94 192* 177*   < > 92    < > = values in this interval not displayed.     A1C    Recent Labs   Lab Test 11/19/22  0212 09/22/16  0741   A1C 4.7 4.8     Troponin  No results for input(s): CTROPT, TROPONINIS, TROPONINI, GHTROP in the last 168 hours.       Data   I have personally spent a total of 35 minutes providing care today, time spent in reviewing medical records and reviewing tests, examining the patient and obtaining history, coordination of care, and discussion with the patient and/or family regarding diagnostic results, prognosis, symptom management, risks and benefits of management options, and development of plan of care.

## 2022-12-01 NOTE — PROGRESS NOTES
Potassium   Date Value Ref Range Status   11/30/2022 4.3 3.4 - 5.3 mmol/L Final   04/20/2020 3.9 3.4 - 5.3 mmol/L Final     Hemoglobin   Date Value Ref Range Status   11/30/2022 7.9 (L) 13.3 - 17.7 g/dL Final   04/20/2020 14.3 13.3 - 17.7 g/dL Final     Creatinine   Date Value Ref Range Status   11/30/2022 2.99 (H) 0.66 - 1.25 mg/dL Final   04/20/2020 1.06 0.66 - 1.25 mg/dL Final     Urea Nitrogen   Date Value Ref Range Status   11/30/2022 73 (H) 7 - 30 mg/dL Final   04/20/2020 23 7 - 30 mg/dL Final     Sodium   Date Value Ref Range Status   11/30/2022 140 133 - 144 mmol/L Final   04/20/2020 139 133 - 144 mmol/L Final     INR   Date Value Ref Range Status   11/29/2022 1.26 (H) 0.85 - 1.15 Final   10/07/2019 1.20 (H) 0.86 - 1.14 Final       DIALYSIS PROCEDURE NOTE  Hepatitis status of previous patient on machine log was checked and verified ok to use with this patients hepatitis status.  Patient dialyzed for 1 hrs, off early d/t procedure. on a K3 bath with a net fluid removal of  0.2L.  A BFR of 350 ml/min was obtained via a RIJ.      The treatment plan was discussed with Dr. Reilly during the treatment.    Total heparin received during the treatment: NONE units. Per MD.  Line flushed, clamped and capped with heparin 1.3ml per lumen    Meds  given: SEE MAR  Complications: Off dialysis 2 hours early d/t procedure. Md aware. 0.2L removed. CVC lumens wrapped in gauze but not labeled d/t urgency for procedure.    ICEBOAT? Timeout performed pre-treatment  I: Patient was identified using 2 identifiers  C:  Consent Signed Yes  E: Equipment preventative maintenance is current and dialysis delivery system OK to use  B: Hepatitis B Surface Antigen: see results review      Hepatitis B Surface Antibody: see results review  O: Dialysis orders present and complete prior to treatment  A: Vascular access verified and assessed prior to treatment  T: Treatment was performed at a clinically appropriate time  ?: Patient was allowed to  ask questions and address concerns prior to treatment  See Adult Hemodialysis flowsheet in [facility] for further details and post assessment.  Machine water alarm in place and functioning. Transducer pods intact and checked every 15min.   Chlorine/Chloramine water system checked every 4 hours.    Patient repositioned every 2 hours during the treatment.  Post treatment report given to Nurses:  Martha Grande, Yasmin Tabares RN regarding 0.2L of fluid removed, last BP of- SEE flowsheet

## 2022-12-01 NOTE — PLAN OF CARE
0700 - 1900 RN Shift Summary    Vitals stable; On pressure support on vent throughout the day, tolerated without complications. Tracheostomy not bleeding, dressing dry. Obtunded, withdraws to pain, moves head spontaneously side to side. Does not follow any commands or track. Family at bedside throughout the day, updated.

## 2022-12-01 NOTE — CONSULTS
INTERVENTIONAL RADIOLOGY - [place] INTERVENTIONAL CONSULT MODEL NOTE    Procedure Requested:   Transjugular liver biopsy.  Paracentesis.    Requesting Provider: Laura Tang MD    Brief History/Plan of Care:  Patient with hepatic failure and ascites.  Transjugular liver biopsy requested.  Platelets >100.  Plan for biopsy as well as paracentesis in IR later this afternoon.  I discussed with patient's wife, Ofe.    I have placed an appropriate order for the procedure as requested by the ordering provider.    Jose Alejandro Noel MD  Vascular and Interventional Radiology

## 2022-12-01 NOTE — PLAN OF CARE
Goal Outcome Evaluation:       Pt opens eyes spontaneously, withdraws from all extremities. Noted occasional non purposeful movement (flexion) from L arm and turns head from side to side. LS coarse, remained on 30% FIO2. PS 10/5 all night, RT decreased PS to 5/5 at 10 am. Pt will transition to trach dome during night shift. Trach oozing secretions and serous sanguinous  fluid. Seen WOC for chest and trach skin tear, Mepilex in place. ST 110s-120 mostly all shift. Received Metoprolol 5 mg IV for afib 130s at 10 am, converted to SR and HR decreased to the 110s. BS active, TF at goal. ABD distended and firm. BM is loose, flexiseal in place. Lactulose held  and dose decreased this AM. Anuric. BS elevated in the 200s this AM decreased to 136 at 1600. Went to IR for Liver Bx and paracentesis (3.4L removed)

## 2022-12-01 NOTE — PROGRESS NOTES
[facility] ICU RESPIRATORY NOTE           Date of Admission: 11/12/2022     Date of Intubation (most recent): 11/26/2022    Reason for Mechanical Ventilation: Respiratory failure      Number of Days on Mechanical Ventilation: 5     Met Criteria for Spontaneous Breathing Trial: Pt is on PS 10/5 through out the night      Significant event today :None      ABG Results:   Recent Labs   Lab 11/30/22  0955 11/26/22  1343 11/26/22  1121 11/25/22  1947   O2PER 30 70 60 45       Current Vent Settings:  Vent Mode: CPAP/PS  (Continuous positive airway pressure with Pressure Support)  FiO2 (%): 30 %  Resp Rate (Set): 16 breaths/min  Tidal Volume (Set, mL): 450 mL  PEEP (cm H2O): 5 cmH2O  Pressure Support (cm H2O): 10 cmH2O  Resp: 21       Skin Assessment :skin tear ,skin discoloration      Plan: Continue full ventilatory support and wean as tolerated.     Traci Kwong, RT

## 2022-12-01 NOTE — PROGRESS NOTES
CLINICAL NUTRITION SERVICES - REASSESSMENT NOTE      Malnutrition: (11/28)  % Weight Loss:  Weight loss does not meet criteria for malnutrition - intentional weight loss over past >1 year, per RD note 11/14  % Intake:  Decreased intake does not meet criteria for malnutrition - various interruptions to TF  Subcutaneous Fat Loss:  Orbital region mild depletion  Muscle Loss:  Temporal region mild depletion  Fluid Retention:  Trace to mild - generalized, bilateral wrist and hand, bilateral foot and ankle edema      Malnutrition Diagnosis: Patient does not meet two of the established criteria necessary for diagnosing malnutrition but is at risk       EVALUATION OF PROGRESS TOWARD GOALS   Diet:  NPO    Nutrition Support:  Patient was receiving goal TF until 11/28 when TF was held for an emesis.  TF was then resumed on 11/29, advanced to goal on 11/30, and continues as follows ~    Nutrition Support Enteral:  Type of Feeding Tube: 14 Yi G-tube   Enteral Frequency:  Continuous  Enteral Regimen: Novasource Renal at 55 mL/hr  Total Enteral Provisions: 2640 kcal (29 kcal/kg), 120 g protein (1.3 g/kg), 242 g CHO, 0 g fiber, 1319 mL H2O  Free Water Flush: 30 mL every 4 hours       Intake/Tolerance:    No labs today   -213 with High sliding scale insulin + Lantus 10 units in am   BM x 4 today and x 1 yest (holding bowel meds), on Lactulose   I/O incomplete (HD)      ASSESSED NUTRITION NEEDS:  Dosing Weight::  90.7 kg (12/1)  Estimated Energy Needs: 9979-5722 kcals (25-30 Kcal/Kg)  Justification: maintenance    Estimated Protein Needs: 110-135 grams protein (1.2-1.5 g pro/Kg)  Justification: hypercatabolism with critical illness and preservation of lean body mass    NEW FINDINGS:   VFEND was discontinued on 11/24  Weight down 16.3 kg from 11/14 -- likely partially fluids in patient on HD, also weights have fluctuated greatly over the last several days so may be scale issues     Previous Goals (11/28):   TF to meet  % estimated needs over the next 3-7 days  Evaluation: Met    Previous Nutrition Diagnosis (11/28):   No nutrition diagnosis identified at this time  Evaluation: No change      CURRENT NUTRITION DIAGNOSIS  No nutrition diagnosis identified at this time     INTERVENTIONS  Recommendations / Nutrition Prescription  Continue current TF regimen as above     Implementation  Collaboration and Referral of Nutrition care:  Patient discussed today during interdisciplinary bedside rounds     Goals  TF will continue to meet % needs     MONITORING AND EVALUATION:  Progress towards goals will be monitored and evaluated per protocol and Practice Guidelines    Martha Sandoval RD, LD, CNSC   Clinical Dietitian - [facility]

## 2022-12-01 NOTE — PROGRESS NOTES
[facility] ICU RESPIRATORY NOTE        Date of Admission: 11/12/2022    Date of Intubation (most recent): 11/26/2022    Reason for Mechanical Ventilation: Respiratory failure    Number of Days on Mechanical Ventilation: 6    Met Criteria for Spontaneous Breathing Trial: YES     Reason for No Spontaneous Breathing Trial: N/A    Significant Events Today: Placed on PS at 11:48 this morning, vitals and saturations remained stable, tidal volumes consistent.    ABG Results:   Recent Labs   Lab 11/30/22  0955 11/26/22  1343 11/26/22  1121 11/25/22  1947   O2PER 30 70 60 45       Current Vent Settings: Vent Mode: CPAP/PS  (Continuous positive airway pressure with Pressure Support)  FiO2 (%): 30 %  Resp Rate (Set): 16 breaths/min  Tidal Volume (Set, mL): 450 mL  PEEP (cm H2O): 5 cmH2O  Pressure Support (cm H2O): 5 cmH2O  Resp: 21      Skin Assessment: Skin tearing and discoloration present    Plan: Continue to support ventilatory and oxygenation needs, wean as tolerated.    Héctor Hinojosa on 12/1/2022 at 2:09 PM

## 2022-12-01 NOTE — PROGRESS NOTES
Renal Medicine Inpatient Dialysis Note                                Blanco Osborne MRN# [id number]   Age: 57 year old YOB: 1965   Date of Admission: 11/12/2022 Hospital LOS: 19          Assessment/Plan:     Blanco Osborne is a 57-year-old male with PMH CARRILLO s/p liver transplant (9/2016) and cirrhosis admitted 11/12/2022 with out of hospital PEA arrest and acute hypoxemic respiratory failure. Course complicated by parainfluenza virus, streptococcal bacteremia, and LORETTA requiring CRRT.      1.  Severe anuric LORETTA:               -CRRT stopped 11/22 and resume on 11/24 and stopped 11/26.               -IHD 11/29/30     2.  Septic shock with MODS:                -no current pressors                  3.  Respiratory failure:               -multiple re intubations               -R Pneumothorax               -Bilateral pleural effusions              -Aspergillus PNA             4.  ESLD due to CARRILLO s/p liver transplant                - tacrolimus on hold               - manage per ICU      5.  Hypoalbuminemia         Next scheduled run 12/03/22      Interval History:     Dialysis run parameters reviewed with dialysis RN at patient bedside.  Patient seen on run    3 hours  2 liter UF  3K    No heparin  Planned liver biopsy today    Trach  PEG      ROS     ROS unable based on patient condition       Dialysis Parameters:     Vitals were reviewed  Patient Vitals for the past 8 hrs:   BP Temp Temp src Pulse Resp SpO2   12/01/22 1315 (!) 145/90 -- -- 112 21 98 %   12/01/22 1307 (!) 146/94 -- -- 117 22 98 %   12/01/22 1230 114/75 -- -- 105 20 98 %   12/01/22 1200 (!) 150/107 98.1  F (36.7  C) Temporal 116 26 99 %   12/01/22 1130 (!) 132/102 -- -- 111 22 97 %   12/01/22 1100 (!) 139/90 -- -- 110 25 97 %   12/01/22 1050 127/81 -- -- (!) 126 26 97 %   12/01/22 1035 -- -- -- (!) 129 23 97 %   12/01/22 1030 119/88 -- -- 118 21 95 %   12/01/22 1000 (!) 143/92 -- -- (!) 122 21 97 %   12/01/22 0900 120/87 -- -- 93 29 99  %   12/01/22 0800 (!) 143/85 97.8  F (36.6  C) Temporal 104 22 98 %   12/01/22 0722 -- -- -- -- -- 98 %   12/01/22 0700 130/81 -- -- 104 16 98 %     I/O last 3 completed shifts:  In: 1205 [I.V.:240; NG/GT:180]  Out: 0     Vitals:    11/27/22 0400 11/28/22 0200 11/29/22 0000 11/30/22 0400   Weight: 91 kg (200 lb 9.9 oz) 93.2 kg (205 lb 7.5 oz) 95.7 kg (210 lb 15.7 oz) 88.5 kg (195 lb 1.7 oz)    12/01/22 0400   Weight: 90.7 kg (199 lb 15.3 oz)       Current Weight: 90.7  Dry Weight: < 90  Dialysis Temp: 36.5  C  Access Device: IJ  Access Site: right  Dialyzer: Revaclear  Dialysis Bath: 3  Sodium Profile: n  UF Goal: 2  Blood Flow Rate (mL/min): 400  Total Treatment Time (hrs): 3  Heparin: Low dose as required      EPO dose: n  Zemplar: n  IV Fe: n      Medications and Allergies:     Reviewed      Physical Exam:     Seen and examined during course of dialysis run    BP (!) 145/90   Pulse 112   Temp 98.1  F (36.7  C) (Temporal)   Resp 21   Ht 1.829 m (6')   Wt 90.7 kg (199 lb 15.3 oz)   SpO2 98%   BMI 27.12 kg/m      GENERAL: sedated   HEENT: NC/AT, PERRLA  NECK: trach  RESP: clear  CV: RRR, normal S1 S2  ABDOMEN: S/NT, BS present  MS: + edema  SKIN: catheter site clean without drainage  NEURO: moves all 4  PSYCH: unable    Data:       Recent Labs   Lab 12/01/22  1231 11/30/22  1201 11/30/22  0807   NA  --   --  140   POTASSIUM  --   --  4.3   CHLORIDE  --   --  105   CO2  --   --  24   ANIONGAP  --   --  11   *   < > 143*   BUN  --   --  73*   CR  --   --  2.99*   GFRESTIMATED  --   --  23*   KELLY  --   --  8.0*    < > = values in this interval not displayed.     Recent Labs   Lab Test 11/30/22  0807 11/29/22  0439 11/28/22  0504 11/27/22  0447 11/26/22  1121 11/26/22  0403 11/25/22  1947 11/25/22  1150 11/25/22  0409 11/24/22 2002   CR 2.99* 3.74* 2.86* 2.02* 1.29* 1.28* 1.43* 1.79* 1.84* 2.30*     Recent Labs   Lab 11/29/22  0439 11/28/22  0504 11/27/22  0447 11/26/22  1121   ALBUMIN 1.9* 2.0* 2.0* 2.3*      Recent Labs   Lab 12/01/22  1303 11/30/22  0807 11/29/22  0439 11/28/22  0504 11/27/22  0447 11/26/22  1121   PHOS  --   --  4.2 3.7 4.3 4.5   HGB 9.2* 7.9* 7.4* 8.3* 8.3* 9.5*         G Jose Reilly MD    [facility] - Nephrology  [phone]

## 2022-12-01 NOTE — PLAN OF CARE
Major Shift Events:  w/drawing from pain all 4 extremities, other neuros unchanged. Multiple soft, loose stools, dignishield placed. On pressure support t/o the night and tolerating w/sats maintaining in the mid to high 90's; LS coarse.  Plan: monitor O2 sats and resp rate on pressure support, will have dialysis today.   For vital signs and complete assessments, please see documentation flowsheets.

## 2022-12-01 NOTE — CONSULTS
Please see initial consult done 11/15 by Tamara Weber.     This consult placed by Dr. Fernandes to request ADITYA-RN folow-up with pt wife Ofe regarding LTACH. Wife not currently at bedside; ADITYA-RN left VM for wife Ofe and will call again this afternoon.     Myriam Spear RN, BSN, PHN, CMSRN  Care Coordination  [facility]

## 2022-12-01 NOTE — PROGRESS NOTES
"Care Management Follow Up    Length of Stay (days): 19  Expected Discharge Date: 2022  Concerns to be Addressed: discharge planning  Patient plan of care discussed at interdisciplinary rounds: Yes  Anticipated Discharge Disposition: LTACH  Anticipated Discharge Services: Transportation  Anticipated Discharge DME: TBD  Patient/family educated on Medicare website which has current facility and service quality ratings:    Education Provided on the Discharge Plan:    Patient/Family in Agreement with the Plan: Yes  Referrals Placed by CM/SW: Internal Clinic Care Coordination  Private pay costs discussed: Not applicable    Additional Information:  Received call from Laura in admission from [facility]. Notifying CM-RN that they have continued to follow pt's chart and have initiated the insurance authorization. CM-RN asked that they reach out to pt's wife Ofe as she had inquired about scheduling a visit; per Laura they have already been in contact with Ofe. During our conversation Laura was notified that insurance is requesting Peer to Peer.    \"Peer to Peer Info: call [phone] Option 5. MD needs to call by 10:30AM. MD needs to give them patient's full name and . MD needs to give the case reference number of [id number]. Insurance wants information regarding trach and vent weaning. They also want information regarding PEG.\"    Above Peer to Peer info was verbally communicated to Dr. Fernandes via phone; Dr. Fernandes confirms she will get this done today.     3:49 PM Received call back from pt wife Ofe who expressed some frustration that no one from CM team reached out to her to explain what LTACH is and what the process would be. She states no one discussed LTACH with her prior to referral being sent to [facility] (sent ). Ofe prefers [facility] location and would like referral sent. Ofe had multiple questions regarding insurance coverage of LTACH; ADITYA-RN directed Ofe to contact insurance provider to obtain pt's " individual coverage benefits.      left for Rashawn at [facility]; ADITYA-RN to please follow-up.     Myriam Spear, RN, BSN, PHN, CMSRN  Care Coordination  [facility]

## 2022-12-02 ENCOUNTER — HOSPITAL ENCOUNTER (INPATIENT)
Dept: NEUROLOGY | Facility: CLINIC | Age: 57
Discharge: HOME | DRG: 004 | End: 2022-12-02
Attending: NURSE PRACTITIONER
Payer: COMMERCIAL

## 2022-12-02 ENCOUNTER — APPOINTMENT (OUTPATIENT)
Dept: ULTRASOUND IMAGING | Facility: CLINIC | Age: 57
DRG: 004 | End: 2022-12-02
Attending: NURSE PRACTITIONER
Payer: COMMERCIAL

## 2022-12-02 LAB
ANION GAP SERPL CALCULATED.3IONS-SCNC: 12 MMOL/L (ref 3–14)
BUN SERPL-MCNC: 95 MG/DL (ref 7–30)
CALCIUM SERPL-MCNC: 8.2 MG/DL (ref 8.5–10.1)
CHLORIDE BLD-SCNC: 103 MMOL/L (ref 94–109)
CO2 SERPL-SCNC: 23 MMOL/L (ref 20–32)
CREAT SERPL-MCNC: 4.01 MG/DL (ref 0.66–1.25)
ERYTHROCYTE [DISTWIDTH] IN BLOOD BY AUTOMATED COUNT: 19.2 % (ref 10–15)
FOLATE SERPL-MCNC: 6.4 NG/ML (ref 4.6–34.8)
GFR SERPL CREATININE-BSD FRML MDRD: 16 ML/MIN/1.73M2
GLUCOSE BLD-MCNC: 219 MG/DL (ref 70–99)
GLUCOSE BLDC GLUCOMTR-MCNC: 198 MG/DL (ref 70–99)
GLUCOSE BLDC GLUCOMTR-MCNC: 205 MG/DL (ref 70–99)
GLUCOSE BLDC GLUCOMTR-MCNC: 222 MG/DL (ref 70–99)
GLUCOSE BLDC GLUCOMTR-MCNC: 230 MG/DL (ref 70–99)
GLUCOSE BLDC GLUCOMTR-MCNC: 236 MG/DL (ref 70–99)
GLUCOSE BLDC GLUCOMTR-MCNC: 302 MG/DL (ref 70–99)
HCT VFR BLD AUTO: 32.8 % (ref 40–53)
HGB BLD-MCNC: 10.4 G/DL (ref 13.3–17.7)
MCH RBC QN AUTO: 34 PG (ref 26.5–33)
MCHC RBC AUTO-ENTMCNC: 31.7 G/DL (ref 31.5–36.5)
MCV RBC AUTO: 107 FL (ref 78–100)
PLATELET # BLD AUTO: 113 10E3/UL (ref 150–450)
POTASSIUM BLD-SCNC: 4.1 MMOL/L (ref 3.4–5.3)
RBC # BLD AUTO: 3.06 10E6/UL (ref 4.4–5.9)
SODIUM SERPL-SCNC: 138 MMOL/L (ref 133–144)
TACROLIMUS BLD-MCNC: 1.3 UG/L (ref 5–15)
TACROLIMUS BLD-MCNC: 1.7 UG/L (ref 5–15)
TME LAST DOSE: ABNORMAL H
UFH PPP CHRO-ACNC: 0.23 IU/ML
VIT B12 SERPL-MCNC: 2652 PG/ML (ref 232–1245)
WBC # BLD AUTO: 22.2 10E3/UL (ref 4–11)

## 2022-12-02 PROCEDURE — 85014 HEMATOCRIT: CPT | Performed by: INTERNAL MEDICINE

## 2022-12-02 PROCEDURE — 80197 ASSAY OF TACROLIMUS: CPT | Performed by: INTERNAL MEDICINE

## 2022-12-02 PROCEDURE — 250N000013 HC RX MED GY IP 250 OP 250 PS 637: Performed by: PHYSICIAN ASSISTANT

## 2022-12-02 PROCEDURE — 250N000011 HC RX IP 250 OP 636: Performed by: PHYSICIAN ASSISTANT

## 2022-12-02 PROCEDURE — 250N000011 HC RX IP 250 OP 636: Performed by: INTERNAL MEDICINE

## 2022-12-02 PROCEDURE — 999N000052 EEG 61-119 MINUTES

## 2022-12-02 PROCEDURE — 82607 VITAMIN B-12: CPT | Performed by: INTERNAL MEDICINE

## 2022-12-02 PROCEDURE — 94640 AIRWAY INHALATION TREATMENT: CPT

## 2022-12-02 PROCEDURE — 250N000012 HC RX MED GY IP 250 OP 636 PS 637: Performed by: INTERNAL MEDICINE

## 2022-12-02 PROCEDURE — 200N000001 HC R&B ICU

## 2022-12-02 PROCEDURE — 999N000157 HC STATISTIC RCP TIME EA 10 MIN

## 2022-12-02 PROCEDURE — 82310 ASSAY OF CALCIUM: CPT | Performed by: INTERNAL MEDICINE

## 2022-12-02 PROCEDURE — 94640 AIRWAY INHALATION TREATMENT: CPT | Mod: 76

## 2022-12-02 PROCEDURE — 250N000009 HC RX 250: Performed by: INTERNAL MEDICINE

## 2022-12-02 PROCEDURE — 250N000009 HC RX 250: Performed by: PHYSICIAN ASSISTANT

## 2022-12-02 PROCEDURE — 95813 EEG EXTND MNTR 61-119 MIN: CPT | Mod: 26 | Performed by: PSYCHIATRY & NEUROLOGY

## 2022-12-02 PROCEDURE — 90935 HEMODIALYSIS ONE EVALUATION: CPT | Performed by: INTERNAL MEDICINE

## 2022-12-02 PROCEDURE — 99291 CRITICAL CARE FIRST HOUR: CPT | Performed by: INTERNAL MEDICINE

## 2022-12-02 PROCEDURE — 250N000009 HC RX 250: Performed by: NURSE PRACTITIONER

## 2022-12-02 PROCEDURE — 250N000012 HC RX MED GY IP 250 OP 636 PS 637: Performed by: NURSE PRACTITIONER

## 2022-12-02 PROCEDURE — 90937 HEMODIALYSIS REPEATED EVAL: CPT

## 2022-12-02 PROCEDURE — 99292 CRITICAL CARE ADDL 30 MIN: CPT | Performed by: INTERNAL MEDICINE

## 2022-12-02 PROCEDURE — 82746 ASSAY OF FOLIC ACID SERUM: CPT | Performed by: INTERNAL MEDICINE

## 2022-12-02 PROCEDURE — 99291 CRITICAL CARE FIRST HOUR: CPT | Mod: 25 | Performed by: PSYCHIATRY & NEUROLOGY

## 2022-12-02 PROCEDURE — 258N000003 HC RX IP 258 OP 636: Performed by: INTERNAL MEDICINE

## 2022-12-02 PROCEDURE — 93880 EXTRACRANIAL BILAT STUDY: CPT

## 2022-12-02 PROCEDURE — 80197 ASSAY OF TACROLIMUS: CPT | Performed by: STUDENT IN AN ORGANIZED HEALTH CARE EDUCATION/TRAINING PROGRAM

## 2022-12-02 PROCEDURE — 85520 HEPARIN ASSAY: CPT | Performed by: INTERNAL MEDICINE

## 2022-12-02 PROCEDURE — 94003 VENT MGMT INPAT SUBQ DAY: CPT

## 2022-12-02 RX ORDER — THIAMINE HYDROCHLORIDE 100 MG/ML
100 INJECTION, SOLUTION INTRAMUSCULAR; INTRAVENOUS DAILY
Status: DISCONTINUED | OUTPATIENT
Start: 2022-12-02 | End: 2022-12-04

## 2022-12-02 RX ORDER — HEPARIN SODIUM 10000 [USP'U]/100ML
0-5000 INJECTION, SOLUTION INTRAVENOUS CONTINUOUS
Status: DISCONTINUED | OUTPATIENT
Start: 2022-12-02 | End: 2022-12-05

## 2022-12-02 RX ORDER — FOLIC ACID 5 MG/ML
1 INJECTION, SOLUTION INTRAMUSCULAR; INTRAVENOUS; SUBCUTANEOUS DAILY
Status: DISCONTINUED | OUTPATIENT
Start: 2022-12-02 | End: 2022-12-15 | Stop reason: HOSPADM

## 2022-12-02 RX ADMIN — ACYCLOVIR: 50 OINTMENT TOPICAL at 22:04

## 2022-12-02 RX ADMIN — TACROLIMUS 1 MG: 5 CAPSULE ORAL at 21:56

## 2022-12-02 RX ADMIN — INSULIN ASPART 3 UNITS: 100 INJECTION, SOLUTION INTRAVENOUS; SUBCUTANEOUS at 08:52

## 2022-12-02 RX ADMIN — INSULIN ASPART 3 UNITS: 100 INJECTION, SOLUTION INTRAVENOUS; SUBCUTANEOUS at 13:03

## 2022-12-02 RX ADMIN — HYDROCORTISONE SODIUM SUCCINATE 50 MG: 100 INJECTION, POWDER, FOR SOLUTION INTRAMUSCULAR; INTRAVENOUS at 06:15

## 2022-12-02 RX ADMIN — CHLORHEXIDINE GLUCONATE 0.12% ORAL RINSE 15 ML: 1.2 LIQUID ORAL at 20:12

## 2022-12-02 RX ADMIN — ACYCLOVIR: 50 OINTMENT TOPICAL at 06:20

## 2022-12-02 RX ADMIN — TACROLIMUS 0.5 MG: 5 CAPSULE ORAL at 10:42

## 2022-12-02 RX ADMIN — HEPARIN SODIUM 1050 UNITS/HR: 10000 INJECTION, SOLUTION INTRAVENOUS at 14:25

## 2022-12-02 RX ADMIN — SODIUM CHLORIDE 250 ML: 9 INJECTION, SOLUTION INTRAVENOUS at 13:22

## 2022-12-02 RX ADMIN — Medication 1 DROP: at 21:56

## 2022-12-02 RX ADMIN — HEPARIN SODIUM 1200 UNITS/HR: 10000 INJECTION, SOLUTION INTRAVENOUS at 21:55

## 2022-12-02 RX ADMIN — CHLORHEXIDINE GLUCONATE 0.12% ORAL RINSE 15 ML: 1.2 LIQUID ORAL at 08:10

## 2022-12-02 RX ADMIN — THIAMINE HYDROCHLORIDE 100 MG: 100 INJECTION, SOLUTION INTRAMUSCULAR; INTRAVENOUS at 14:36

## 2022-12-02 RX ADMIN — ACYCLOVIR: 50 OINTMENT TOPICAL at 14:38

## 2022-12-02 RX ADMIN — Medication: at 13:22

## 2022-12-02 RX ADMIN — IPRATROPIUM BROMIDE AND ALBUTEROL SULFATE 3 ML: .5; 3 SOLUTION RESPIRATORY (INHALATION) at 18:57

## 2022-12-02 RX ADMIN — SODIUM CHLORIDE 300 ML: 9 INJECTION, SOLUTION INTRAVENOUS at 13:22

## 2022-12-02 RX ADMIN — HYDROCORTISONE SODIUM SUCCINATE 50 MG: 100 INJECTION, POWDER, FOR SOLUTION INTRAMUSCULAR; INTRAVENOUS at 00:43

## 2022-12-02 RX ADMIN — Medication 15 ML: at 08:10

## 2022-12-02 RX ADMIN — HYDROCORTISONE SODIUM SUCCINATE 50 MG: 100 INJECTION, POWDER, FOR SOLUTION INTRAMUSCULAR; INTRAVENOUS at 12:56

## 2022-12-02 RX ADMIN — Medication 1 DROP: at 16:53

## 2022-12-02 RX ADMIN — Medication 1 DROP: at 10:00

## 2022-12-02 RX ADMIN — INSULIN ASPART 4 UNITS: 100 INJECTION, SOLUTION INTRAVENOUS; SUBCUTANEOUS at 04:16

## 2022-12-02 RX ADMIN — FOLIC ACID 1 MG: 5 INJECTION, SOLUTION INTRAMUSCULAR; INTRAVENOUS; SUBCUTANEOUS at 16:44

## 2022-12-02 RX ADMIN — INSULIN ASPART 3 UNITS: 100 INJECTION, SOLUTION INTRAVENOUS; SUBCUTANEOUS at 16:47

## 2022-12-02 RX ADMIN — HEPARIN SODIUM 5000 UNITS: 5000 INJECTION, SOLUTION INTRAVENOUS; SUBCUTANEOUS at 06:20

## 2022-12-02 RX ADMIN — INSULIN ASPART 7 UNITS: 100 INJECTION, SOLUTION INTRAVENOUS; SUBCUTANEOUS at 00:43

## 2022-12-02 RX ADMIN — INSULIN GLARGINE 10 UNITS: 100 INJECTION, SOLUTION SUBCUTANEOUS at 08:51

## 2022-12-02 RX ADMIN — HYDROCORTISONE SODIUM SUCCINATE 50 MG: 100 INJECTION, POWDER, FOR SOLUTION INTRAMUSCULAR; INTRAVENOUS at 20:00

## 2022-12-02 RX ADMIN — LACTULOSE 10 G: 20 SOLUTION ORAL at 21:56

## 2022-12-02 RX ADMIN — IPRATROPIUM BROMIDE AND ALBUTEROL SULFATE 3 ML: .5; 3 SOLUTION RESPIRATORY (INHALATION) at 07:18

## 2022-12-02 RX ADMIN — Medication 40 MG: at 08:10

## 2022-12-02 RX ADMIN — IPRATROPIUM BROMIDE AND ALBUTEROL SULFATE 3 ML: .5; 3 SOLUTION RESPIRATORY (INHALATION) at 11:18

## 2022-12-02 RX ADMIN — INSULIN ASPART 4 UNITS: 100 INJECTION, SOLUTION INTRAVENOUS; SUBCUTANEOUS at 20:11

## 2022-12-02 RX ADMIN — LACTULOSE 10 G: 20 SOLUTION ORAL at 16:53

## 2022-12-02 RX ADMIN — LACTULOSE 10 G: 20 SOLUTION ORAL at 08:10

## 2022-12-02 RX ADMIN — METOPROLOL TARTRATE 5 MG: 5 INJECTION INTRAVENOUS at 20:12

## 2022-12-02 RX ADMIN — IPRATROPIUM BROMIDE AND ALBUTEROL SULFATE 3 ML: .5; 3 SOLUTION RESPIRATORY (INHALATION) at 15:41

## 2022-12-02 ASSESSMENT — ACTIVITIES OF DAILY LIVING (ADL)
ADLS_ACUITY_SCORE: 53
ADLS_ACUITY_SCORE: 53
ADLS_ACUITY_SCORE: 49
ADLS_ACUITY_SCORE: 53
ADLS_ACUITY_SCORE: 53
ADLS_ACUITY_SCORE: 49
ADLS_ACUITY_SCORE: 49
ADLS_ACUITY_SCORE: 53

## 2022-12-02 NOTE — PROGRESS NOTES
[facility]    Blanco's insurance denied authorization to admit to LTAC.   1) Peer to Peer that was requested was not completed.   2) Patient is still in acute phase of renal failure.   3) No documentation in chart of 3 failed weaning attempts.    We will continue to follow patient for LTAC admission and  re submit insurance authorization when patient is medically stable.     Hollis Reeder RN, BSN, HNB-BC  Utilization , [place] [address]    [email or web address]      Office: [phone]  Fax: [phone]    CONFIDENTIAL Protected under [place] Statute  145.61 et seq.

## 2022-12-02 NOTE — PROGRESS NOTES
Patient placed on heated trach dome 40L 40%, so far tolerating fine, SpO2: 98%    Moises Martinez, RRT on 12/2/2022 at 8:20 AM

## 2022-12-02 NOTE — PROGRESS NOTES
Potassium   Date Value Ref Range Status   12/02/2022 4.1 3.4 - 5.3 mmol/L Final   04/20/2020 3.9 3.4 - 5.3 mmol/L Final     Hemoglobin   Date Value Ref Range Status   12/01/2022 9.2 (L) 13.3 - 17.7 g/dL Final   04/20/2020 14.3 13.3 - 17.7 g/dL Final     Creatinine   Date Value Ref Range Status   12/02/2022 4.01 (H) 0.66 - 1.25 mg/dL Final   04/20/2020 1.06 0.66 - 1.25 mg/dL Final     Urea Nitrogen   Date Value Ref Range Status   12/02/2022 95 (H) 7 - 30 mg/dL Final   04/20/2020 23 7 - 30 mg/dL Final     Sodium   Date Value Ref Range Status   12/02/2022 138 133 - 144 mmol/L Final   04/20/2020 139 133 - 144 mmol/L Final     INR   Date Value Ref Range Status   12/01/2022 1.17 (H) 0.85 - 1.15 Final   10/07/2019 1.20 (H) 0.86 - 1.14 Final      Latest Reference Range & Units 11/14/22 11:41   Hep B Surface Agn Nonreactive  Nonreactive   Hepatitis B Surface Antibody Instrument Value <8.00 m[IU]/mL 0.00   Hepatitis B Surface Antibody  Nonreactive     DIALYSIS PROCEDURE NOTE  Hepatitis status of previous patient on machine log was checked and verified ok to use with this patients hepatitis status.  Patient dialyzed for 3 hrs. on a K3 bath with a net fluid removal of  0.8L.    A BFR of 350 ml/min was obtained via a right temporary CVC.      The treatment plan was discussed with Dr. Carrasquillo during the treatment.    Total heparin received during the treatment: 0 units- pt started on a heparin drip mid-tx per primary care team.   Line flushed, clamped and capped with heparin 1:1000 1.3 mL (1300 units) per lumen  Meds  given: none     Complications: pre-treatment HR was 110-120s SR; About 1 hour into treatment HR became increasingly irregular with short runs in the 160's. UF turned off temporarily and HR stabilized in 120's. UF resumed with reduced UF goal, however SBP dropped suddenly to 80s in the last hour of treatment. UF turned off for remainder of treatment with SBP >90. MD updated.        Person educated: patient, family.  Knowledge base minimal. Barriers to learning: pt trached/non-verbal, no barriers for family. Educated on procedure via verbal mode. patient/family verbalized understanding. Pt/family prefers verbal education style.     ICEBOAT? Timeout performed pre-treatment  I: Patient was identified using 2 identifiers  C:  Consent Signed Yes  E: Equipment preventative maintenance is current and dialysis delivery system OK to use  B: Hepatitis B Surface Antigen: see above      Hepatitis B Surface Antibody: see above  O: Dialysis orders present and complete prior to treatment  A: Vascular access verified and assessed prior to treatment  T: Treatment was performed at a clinically appropriate time  ?: Patient was allowed to ask questions and address concerns prior to treatment  See Adult Hemodialysis flowsheet in [facility] for further details and post assessment.  Machine water alarm in place and functioning. Transducer pods intact and checked every 15min.   Pt dialyzed at the bedside in room 370.  Chlorine/Chloramine water system checked every 4 hours.  Outpatient Dialysis at D.    Patient repositioned every 2 hours during the treatment.  Post treatment report given to KEITH Santana RN regarding 0.8L of fluid removed, last BP of 107/62.

## 2022-12-02 NOTE — PROGRESS NOTES
Renal Medicine       Dialysis run parameters reviewed with dialysis RN at patient bedside.  Seen during course of dialysis    3 hours  3K  2 liter UF  No access issues    Tachycardia  BP OK    Stable mid run      Recent Labs   Lab 12/02/22  1301 12/02/22  0851 12/02/22  0421   NA  --   --  138   POTASSIUM  --   --  4.1   CHLORIDE  --   --  103   CO2  --   --  23   ANIONGAP  --   --  12   *   < > 219*   BUN  --   --  95*   CR  --   --  4.01*   GFRESTIMATED  --   --  16*   KELLY  --   --  8.2*    < > = values in this interval not displayed.           MIKEL Reilly    [facility]  [phone]

## 2022-12-02 NOTE — PROGRESS NOTES
[facility] ICU RESPIRATORY NOTE           Date of Admission: 11/12/2022     Date of Intubation (most recent): 11/26/2022    Reason for Mechanical Ventilation: Respiratory failure      Number of Days on Mechanical Ventilation: 6     Met Criteria for Spontaneous Breathing Trial: Pt is on PS 5/5 30% through out the night     Significant event today :None     ABG Results:   Recent Labs   Lab 11/30/22  0955 11/26/22  1343 11/26/22  1121 11/25/22  1947   O2PER 30 70 60 45       Current Vent Settings:  Vent Mode: CPAP/PS  (Continuous positive airway pressure with Pressure Support)  FiO2 (%): 30 %  PEEP (cm H2O): 5 cmH2O  Pressure Support (cm H2O): 5 cmH2O  Resp: (!) 33     Skin Assessment :skin tear ,skin discoloration , drainage      Plan: Continue full ventilatory support and wean as tolerated.     Traci Kwong, RT

## 2022-12-02 NOTE — PROGRESS NOTES
[facility]    Stroke/Neurocritical Care Progress Note    Interval EventsRe-consulted for worsened encephalopathy. Over the weekend, pt was alert and somewhat conversant. He is now not verbalizing and minimally interactive. He did have hyperammonemia 11/29 which has since trended down. He did receive versed/fentanyl for a paracentesis and transjugular liver biopsy yesterday.   CT head performed yesterday is stable.     HPI Summary  Blanco Osborne is a 56 y/o male with a PMHx of CARRILLO s/p liver transplant 09/2016 and cirrhosis admitted on 11/12/22 with out of hospital PEA arrest and acute hypoxemic respiratory failure. His hospital course has been complicated by parainfluenza virus, streptococcal bacteremia, and LORETTA requiring CRRT/HD. He is s/p trach/PEG.     Stroke Evaluation Summarized    MRI/Head CT CT 12/1: no acute pathology or significant interval change    MRI 11/17: multifocal punctate acute/subacute infarcts    Intracranial Vasculature N/A: unable to get contrasted scan d/t LORETTA   Cervical Vasculature N/A: unable to get contrasted scan d/t LORETTA     Echocardiogram EF 55-60%, mild tricuspid and aortic regurg; large pleural effusion and ascites, sinus keon    EKG/Telemetry SR w/ bigeminy and short ND   Other Testing Not Applicable      LDL  11/20/2022: 80 mg/dL   A1C  11/19/2022: 4.7 %   Troponin No lab value available in past 48 hrs       Impression/Plan  #Encephalopathy, likely multifactorial, recently elevated ammonia, sedation for procedure on 12/1. CT head reassuring (stable, no acute pathology). Previous MRI without obvious anoxic injury (11/17). EEG 11/13-11/14 with generalized burst suppression, no seizures.   - continue supportive cares    #Small multifocal ischemic strokes suspected d/t cardiac arrest. Also has paroxysmal afib which may be source. Unable to perform vessel imaging d/t LORETTA.   - carotid US ordered in lieu of CTA/MRA    Patient Follow-up    - final  "recommendation pending work-up    We will continue to follow peripherally for results of CUS.     Laura Dumont NP  Vascular Neurology    To page me or covering stroke neurology team member, click here: [facility]   Choose \"On Call\" tab at top, then search dropdown box for \"Neurology Adult\", select location, press Enter, then look for stroke/neuro ICU/telestroke.  ______________________________________________________    Clinically Significant Risk Factors Present on Admission                  Medications   Scheduled Meds    - MEDICATION INSTRUCTIONS for Dialysis Patients -   Does not apply See Admin Instructions     sodium chloride 0.9%  250 mL Intravenous Once in dialysis/CRRT     sodium chloride 0.9%  300 mL Hemodialysis Machine Once     acyclovir   Topical Q8H     artificial tears  1 drop Both Eyes TID     chlorhexidine  15 mL Mouth/Throat Q12H     heparin ANTICOAGULANT  5,000 Units Subcutaneous Q8H     heparin lock flush  5-20 mL Intracatheter Q24H     hydrocortisone sodium succinate PF  50 mg Intravenous Q6H     insulin aspart  1-12 Units Subcutaneous Q4H     insulin glargine  10 Units Subcutaneous QAM AC     ipratropium - albuterol 0.5 mg/2.5 mg/3 mL  3 mL Nebulization 4x daily     lactulose  10 g Oral TID     multivitamins w/minerals  15 mL Per Feeding Tube Daily     pantoprazole  40 mg Per Feeding Tube QAM AC    Or     pantoprazole  40 mg Intravenous QAM AC     polyethylene glycol  17 g Oral Daily     senna-docusate  1 tablet Oral BID    Or     senna-docusate  2 tablet Oral BID     sodium chloride (PF)  10-40 mL Intracatheter Q8H     tacrolimus  0.5 mg Oral or Feeding Tube BID       Infusion Meds    dextrose       - MEDICATION INSTRUCTIONS -       - MEDICATION INSTRUCTIONS -       norepinephrine       phenylephrine Stopped (11/29/22 0500)     sodium chloride Stopped (12/01/22 1000)     sodium chloride 0 mL/hr at 11/22/22 0742     vasopressin         PRN Meds  sodium chloride 0.9%, albuterol, dextrose, glucose " **OR** dextrose **OR** glucagon, flumazenil, heparin lock flush, - MEDICATION INSTRUCTIONS -, - MEDICATION INSTRUCTIONS -, metoprolol, naloxone **OR** naloxone **OR** naloxone **OR** naloxone, ondansetron **OR** ondansetron, prochlorperazine **OR** prochlorperazine **OR** prochlorperazine, promethazine, sodium chloride (PF), sodium chloride (PF)       PHYSICAL EXAMINATION  Temp:  [97.5  F (36.4  C)-98.9  F (37.2  C)] 98.9  F (37.2  C)  Pulse:  [] 116  Resp:  [10-33] 22  BP: ()/() 119/77  FiO2 (%):  [30 %-45 %] 40 %  SpO2:  [95 %-100 %] 98 %      Neurologic  Mental Status:  alert, not following commands, no vocalization during exam, not tracking    Cranial Nerves: PERRL, EOMI with normal smooth pursuit, face appears symmetric  Motor:  no abnormal movements, no spontaneous/purposeful movement noted in any limb  Sensory:  Did not react to noxious stimuli in any limb  Coordination:  unable to test d/t pt's mental status   Station/Gait:  deferred    Imaging  I personally reviewed all imaging; relevant findings per HPI.     Lab Results Data   CBC  Recent Labs   Lab 12/01/22  1303 11/30/22  0807 11/29/22  0439   WBC 19.7* 10.7 8.9   RBC 2.73* 2.33* 2.19*   HGB 9.2* 7.9* 7.4*   HCT 29.3* 24.1* 22.3*   * 66* 57*     Basic Metabolic Panel    Recent Labs   Lab 12/02/22  0851 12/02/22  0421 12/02/22  0417 12/01/22  1549 12/01/22  1303 11/30/22  1201 11/30/22  0807   NA  --  138  --   --  139  --  140   POTASSIUM  --  4.1  --   --  3.7  --  4.3   CHLORIDE  --  103  --   --  104  --  105   CO2  --  23  --   --  22  --  24   BUN  --  95*  --   --  103*  --  73*   CR  --  4.01*  --   --  4.10*  --  2.99*   * 219* 222*   < > 220*   < > 143*   KELLY  --  8.2*  --   --  8.1*  --  8.0*    < > = values in this interval not displayed.     Liver Panel  Recent Labs   Lab 11/29/22  0439 11/28/22  0504 11/27/22  0447   PROTTOTAL 4.9* 5.1* 5.0*   ALBUMIN 1.9* 2.0* 2.0*   BILITOTAL 0.9 1.0 1.1   ALKPHOS 189* 250*  177*   AST 19 23 33   ALT 24 30 29     INR    Recent Labs   Lab Test 12/01/22  1303 11/29/22  1402 11/14/22  1020   INR 1.17* 1.26* 1.50*      Lipid Profile    Recent Labs   Lab Test 11/20/22  1151 04/20/20  1157 08/01/19  1500 01/08/19  0840 10/31/17  1037   CHOL 121 161  --   --  134   HDL 22* 42  --   --  57   LDL 80 81  --   --  58   TRIG 94 192* 177*   < > 92    < > = values in this interval not displayed.     A1C    Recent Labs   Lab Test 11/19/22  0212 09/22/16  0741   A1C 4.7 4.8     Troponin  No results for input(s): CTROPT, TROPONINIS, TROPONINI, GHTROP in the last 168 hours.       Data   I personally examined and evaluated the patient today. At the time of my evaluation and management the patient was in critical condition today due to recent cardiac arrest, LORETTA, encephalopathy. Key decisions made today included follow up imaging. I spent a total of 45 minutes providing critical care services, evaluating the patient, directing care and reviewing laboratory values and radiologic reports.

## 2022-12-02 NOTE — PROGRESS NOTES
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  Likely secondary to acute respiratory failure and COPD exacerbation and parainfluenza and strep pneumo pneumonias. He is down to 30% and +8 PEEP but has failed extubation twice and plans are for a trach/PEG early this week. He also has acute renal failure and is dialysis dependent.Mental status has been improving and was able to participate in a conversation about trach on Sunday but mental status is deteriorated since.  Got 10 mg of oxycodone on Sunday and then received some fentanyl and midazolam yesterday.  During PEG tube they noticed ascitic fluid and drained 3 L.  Further information suggests that patient has had progression of his liver disease.  Ammonia has been elevated.      Assessment and plan :     Blanco Osborne IS a 57 year old male admitted on 11/12/2022 for acute respiratory failure.   I have personally reviewed the daily labs, imaging studies, cultures and discussed the case with referring physician and consulting physicians.     My assessment and plan by system for this patient is as follows:    Neurology/Psychiatry:   Possible anoxic encephalopathy as  well as small embolic strokes..  Alhougordon has been intermittently awake and following commands, long term CNS prognosis is guarded. Persistent somnolence after receiving sedation over the weekend and on Monday.  Have discovered hepatic encephalopathy.  On lactulose.  He does appear more alert today than yesterday but is not interactive.  -Continue to hold all sedatives.  -Continue lactulose.  -Note neurology recommendations to initiate heparin and they are also getting a carotid ultrasound.  Platelets and medical condition make possible to do this so we will start today.    Cardiovascular:   1.  Status post PEA arrest 20 minutes of CPR.  Not a primary cardiac event.  Most likely secondary to respiratory failure. FRANKLIN was normal.  Was in shock which is now resolved.  Lipids and hemoglobin A1c were both within normal limits.      Pulmonary/Ventilator Management:   Acute respiratory failure and ARDS secondary to viral and bacterial pneumonia -improved.  Reintubated twice secondary to same as well as weakness encephalopathy and inability to manage secretions.  Long talk on 11/27 with Dr. Jones resulted in a decision to proceed with tracheostomy.  Also with pneumothorax status post chest tube.  No airleak seen today and chest tube with minimal output. CT on water seal for many days and removed on November 30..  Tracheostomy was done on 1129.  Had to be urgently replaced because of malfunctioning cuff.  Exchanged for an XLT.  -Pressure support mode indefinitely.  Trach dome and possible.    GI and Nutrition :   1.  Status post liver transplant with evidence of progression of chronic liver disease.  On chronic tacrolimus.  Follows at the [facility].  Have discussed extensively with transplant service who noted that patient had some evidence of fibrosis in 2017, so this is likely progression of same.  They say treatment for this process would have resolved working on metabolic's syndrome and complete abstinence from  alcohol.  Patient was lost to follow-up for 2 years.  Family does report patient has had some alcohol intake.  2.  Tolerating enteral nutrition   -Awaiting results of paracentesis and liver biopsy   -Tacrolimus per transplant service   -Enteral nutrition   -Lactulose   -Check thiamine and folate levels.    Renal/Fluids/Electrolytes:   1.  Acute renal failure now dialysis dependent.  No indication as of yet return of renal function.  Intermittent dialysis ongoing    Infectious Disease:   1.  Streptococcal pneumonia as well as parainfluenza as well as possible Aspergillus thought less likely by infectious disease consultant.  Also herpes labialis infection.  Completed antibiotics.  -Acyclovir for 21-day course  -Tacrolimus being resumed.    Endocrine:   1. Glucoses ok on coverage.  On hydrocortisone.  -Continue hydrocortisone for now.  Tacrolimus has been resumed and will wean hydrocortisone when this is therapeutic.  We will discuss with the transplant service.    Hematology/Oncology:   1.  Anemia of critical illness and also secondary to kidney disease; .  Thrombocytopenia unclear etiology; .  Possibly related to cirrhosis  -Continue DVT prophylaxis and defer further work-up at this time.  -Monitor.  Checking RBC folate and thiamine     IV/Access:   1. Venous access -   3.  Plan  - central access required and necessary.  We will not remove today.      ICU Prophylaxis:   1. DVT: Hep Subq/ LMWH/mechanical  2. VAP: HOB 30 degrees, chlorhexidine rinse  3. Stress Ulcer: PPI/H2 blocker  4. Restraints: Nonviolent soft two point restraints required and necessary for patient safety and continued cares and good effect as patient continues to pull at necessary lines, tubes despite education and distraction. Will readdress daily.   5. IV Access - central access required and necessary for continued patient cares.  Will discuss daily with nursing  6. Feeding - enteral   7. Family Update: Conference with siblings spouse and close  friends as well as parents and parent in law.  I discussed more guarded prognosis for patients who have chronic liver disease and to require long-term mechanical ventilation.  Family fairly united in their opinion that patient would want to continue with aggressive care in the short-term at least..  8. Disposition - ICU care.  Patient very well may be ready for LTAC midweek.      Key goals for next 24 hours:   1.Continue off sedation and continue on lactulose   2.  Pressure support trial and trach dome  3.  Start heparin and follow-up on all diagnostic studies.  4.  Disposition planning to resume next week pending patient's clinical condition.         Key Medications:       - MEDICATION INSTRUCTIONS for Dialysis Patients -   Does not apply See Admin Instructions     sodium chloride 0.9%  250 mL Intravenous Once in dialysis/CRRT     sodium chloride 0.9%  300 mL Hemodialysis Machine Once     sodium chloride 0.9%  250 mL Intravenous Once in dialysis/CRRT     sodium chloride 0.9%  300 mL Hemodialysis Machine Once     acyclovir   Topical Q8H     artificial tears  1 drop Both Eyes TID     chlorhexidine  15 mL Mouth/Throat Q12H     heparin ANTICOAGULANT  5,000 Units Subcutaneous Q8H     heparin ANTICOAGULANT Loading dose  60 Units/kg Intravenous Once     heparin lock flush  5-20 mL Intracatheter Q24H     hydrocortisone sodium succinate PF  50 mg Intravenous Q6H     insulin aspart  1-12 Units Subcutaneous Q4H     insulin glargine  10 Units Subcutaneous QAM AC     ipratropium - albuterol 0.5 mg/2.5 mg/3 mL  3 mL Nebulization 4x daily     lactulose  10 g Oral TID     multivitamins w/minerals  15 mL Per Feeding Tube Daily     - MEDICATION INSTRUCTIONS -   Does not apply Once     pantoprazole  40 mg Per Feeding Tube QAM AC    Or     pantoprazole  40 mg Intravenous QAM AC     polyethylene glycol  17 g Oral Daily     senna-docusate  1 tablet Oral BID    Or     senna-docusate  2 tablet Oral BID     sodium chloride (PF)  10-40 mL  Intracatheter Q8H     tacrolimus  0.5 mg Oral or Feeding Tube BID       dextrose       heparin       - MEDICATION INSTRUCTIONS -       - MEDICATION INSTRUCTIONS -       norepinephrine       phenylephrine Stopped (11/29/22 0500)     sodium chloride Stopped (12/01/22 1000)     sodium chloride 0 mL/hr at 11/22/22 0742     vasopressin                 Physical Examination:   Temp:  [97.3  F (36.3  C)-98.9  F (37.2  C)] 97.3  F (36.3  C)  Pulse:  [] 113  Resp:  [10-33] 19  BP: ()/() 115/75  FiO2 (%):  [30 %-45 %] 30 %  SpO2:  [95 %-100 %] 98 %           Intake/Output Summary (Last 24 hours) at 12/2/2022 1323  Last data filed at 12/2/2022 0500  Gross per 24 hour   Intake 1130 ml   Output 200 ml   Net 930 ml   Net:         Wt Readings from Last 4 Encounters:   12/02/22 86.1 kg (189 lb 13.1 oz)   10/07/19 137.5 kg (303 lb 3.2 oz)   10/04/19 131.5 kg (290 lb)   02/20/19 140.4 kg (309 lb 9.6 oz)     BP - Mean:  [] 86  Vent Mode: CPAP/PS  (Continuous positive airway pressure with Pressure Support)  FiO2 (%): 30 %  PEEP (cm H2O): 5 cmH2O  Pressure Support (cm H2O): 5 cmH2O  Resp: 19    Recent Labs   Lab 11/30/22  0955 11/26/22  1343 11/26/22  1121 11/25/22  1947   O2PER 30 70 60 45       GEN: Intubated, eyes open.  Intermittent hiccuping or gagging  HEENT: head ncat, sclera anicteric, OP patent, trachea midline, healing ulcers in nostrils and mouth have improved, slightly purulent sanguinous discharge from trach site but not large in volume.  PULM: Right-sided chest tube bandages clean, dried blood on tracheostomy site but no active bleeding pulling good tidal volumes on pressure support with occasional gagging  CV/COR: RRR S1S2 no gallop,  No rub, no murmur  ABD: soft nontender, nondistended, PEG tube site looks clean  EXT: Anasarca  NEURO: Eyes open.  Moving head spontaneously.  Seems like he might be tracking me today but is not interactive.  SKIN: no obvious rash; no cyanosis or mottling   LINES:  clean, dry intact dialysis catheter         Data:   All data and imaging reviewed     ROUTINE ICU LABS (Last four results)  CMP  Recent Labs   Lab 12/02/22  1301 12/02/22  0851 12/02/22  0421 12/02/22  0417 12/01/22  1549 12/01/22  1303 11/30/22  1201 11/30/22  0807 11/29/22  0737 11/29/22  0439 11/28/22  0508 11/28/22  0504 11/27/22  0921 11/27/22  0447 11/26/22  1524 11/26/22  1121 11/26/22  0411 11/26/22  0403   NA  --   --  138  --   --  139  --  140  --  138  --  140  --  139  --  137  --  138   POTASSIUM  --   --  4.1  --   --  3.7  --  4.3  --  4.4  --  4.3  --  4.1  --  4.2  --  4.3   CHLORIDE  --   --  103  --   --  104  --  105  --  105  --  105  --  107  --  106  --  105   CO2  --   --  23  --   --  22  --  24  --  24  --  23  --  24  --  23  --  27   ANIONGAP  --   --  12  --   --  13  --  11  --  9  --  12  --  8  --  8  --  6   * 198* 219* 222*   < > 220*   < > 143*   < > 149*   < > 245*   < > 175*   < > 123*   < > 120*   BUN  --   --  95*  --   --  103*  --  73*  --  100*  --  83*  --  56*  --  37*  --  40*   CR  --   --  4.01*  --   --  4.10*  --  2.99*  --  3.74*  --  2.86*  --  2.02*  --  1.29*  --  1.28*   GFRESTIMATED  --   --  16*  --   --  16*  --  23*  --  18*  --  25*  --  38*  --  64  --  65   KELLY  --   --  8.2*  --   --  8.1*  --  8.0*  --  7.9*  --  7.9*  --  7.7*  --  8.2*  --  8.7   MAG  --   --   --   --   --   --   --   --   --  2.3  --   --   --  2.1  --  1.9  --  1.9   PHOS  --   --   --   --   --   --   --   --   --  4.2  --  3.7  --  4.3  --  4.5  --  4.5   PROTTOTAL  --   --   --   --   --   --   --   --   --  4.9*  --  5.1*  --  5.0*  --  5.7*  --  6.0*   ALBUMIN  --   --   --   --   --   --   --   --   --  1.9*  --  2.0*  --  2.0*  --  2.3*  --  2.1*   BILITOTAL  --   --   --   --   --   --   --   --   --  0.9  --  1.0  --  1.1  --   --   --  1.6*   ALKPHOS  --   --   --   --   --   --   --   --   --  189*  --  250*  --  177*  --   --   --  210*   AST  --   --   --   --    --   --   --   --   --  19  --  23  --  33  --   --   --  39   ALT  --   --   --   --   --   --   --   --   --  24  --  30  --  29  --   --   --  37    < > = values in this interval not displayed.     CBC  Recent Labs   Lab 12/01/22  1303 11/30/22  0807 11/29/22  0439 11/28/22  0504   WBC 19.7* 10.7 8.9 13.9*   RBC 2.73* 2.33* 2.19* 2.47*   HGB 9.2* 7.9* 7.4* 8.3*   HCT 29.3* 24.1* 22.3* 26.0*   * 103* 102* 105*   MCH 33.7* 33.9* 33.8* 33.6*   MCHC 31.4* 32.8 33.2 31.9   RDW 19.6* 19.7* 19.0* 19.2*   * 66* 57* 76*     INR  Recent Labs   Lab 12/01/22  1303 11/29/22  1402   INR 1.17* 1.26*     Arterial Blood Gas  Recent Labs   Lab 11/30/22  0955 11/26/22  1343 11/26/22  1121 11/25/22  1947   O2PER 30 70 60 45       All cultures:  No results for input(s): CULT in the last 168 hours.      Laura Fernandes MD    Billing: This patient is critically ill: Yes. Total critical care time today 90 minutes including conference discussing patient's wishes for his care and excluding time for procedures.

## 2022-12-02 NOTE — PROGRESS NOTES
"SPIRITUAL HEALTH SERVICES Progress Note     SH  ICU     Visited with Blanco and family at the bedside per-length of stay protocol.    I introduced SH support to family and noted availability. They deny SH needs at this time. Family noted that they \"can't imagine how Blanco must be feeling right now.\"     SH remains available to support this pt/family while admitted to [facility]. Please consult or page if any immediate needs arise.     ------  Vitor Palacios M.Div.  Resident   Pager: [phone]   "

## 2022-12-02 NOTE — PROGRESS NOTES
Alert, does not follow commands or track. No neuro changes. lactulose given with loose BM's and rectal tube. Small loose BM in tube, slight leak around. Pamela cares and barrier ointment to perianal area. Cares per orders.

## 2022-12-02 NOTE — PROGRESS NOTES
Care Management Follow Up    Length of Stay (days): 20    Expected Discharge Date: 2022     Concerns to be Addressed:       Patient plan of care discussed at interdisciplinary rounds: Yes    Anticipated Discharge Disposition: LTACH     Anticipated Discharge Services: None  Anticipated Discharge DME: Other (see comment) (TBD)    Patient/family educated on Medicare website which has current facility and service quality ratings:    Education Provided on the Discharge Plan:    Patient/Family in Agreement with the Plan: yes    Referrals Placed by CM/SW: Post Acute Facilities  Private pay costs discussed: insurance costs TBD    Additional Information:  Following for discharge planning to LTACH.     CC called [facility] Liaison (Elodia covering for Rashawn).  Referral sent via [facility].  She will review.  Of note they are capped at Dialysis beds right now and do not anticipate any till at least next week.    Per system protocol if Pt is ready and has acceptable bed they would need to transfer there.  Also insurance authorization was submitted and if accepted is only good for 7 days so LTACH option could be lost if we wait for [facility] Bed per Elodia.     CC called Gracie at [facility].  She has not heard of status of [facility].  They do have available bed pending auth.   Gracie will reach out to [facility] to find status of auth.     CC talked to MD and she had not called [facility] yet as she was awaiting liver biopsy results and conversation with family.  Updated that [facility] on this authorization is only good till 10:30 today.  If not done today authorization would have to be submitted next week per Gracie.     [facility] Info: call [phone] Option 5. MD needs to call by 10:30AM. MD needs to give then patient's full name and . MD needs to give the case reference number of [id number].?     Will wait to update wife till after rounds.    Addendum 12:00  Briefly sat in family meeting with Dr. Fernandes, Sister Theresa, Friend (in  person) and via phone, Ofe (wife), Ofe's dad, Pt's brother Maxime, Pt's mom and Dad.  CC updated on information regarding [facility] ( [facility] authorization denied, await Pt to be medically stable) and that referral was sent to [facility] but they indicate they will not have beds.  Family voiced understanding that when Pt was medically ready we would have to go with facility that has availability and that it is needed that only one LTACH would obtain a new authorization. Given weekend and further discussions with MD Pt would not be ready till next week.     Family would still prefer [facility] but understand they may have to go to  [facility].  They would like CC next week to check if availability at [facility] has changed.   CC to follow for transfer to [facility]        Alethea Mclean RN

## 2022-12-02 NOTE — PROGRESS NOTES
Brief Transplant Hepatology note:    Reviewed tacrolimus trough level:     Latest Reference Range & Units 12/02/22 06:22   Tacrolimus Last Dose Date  See Comment   Tacrolimus Last Dose Time  See Comment   Tacrolimus by Tandem Mass Spectrometry 5.0 - 15.0 ug/L 1.3 (L)   (L): Data is abnormally low    He had been restarted on tacrolimus 0.5 mg BID on 11/30. He is not on interfering medications and his creatinine remains elevated at 4.01. Increased tacrolimus to 1 mg BID and ordered repeat tacrolimus level on Monday, 12/5.    Will continue to follow peripherally. Liver biopsy also pending.      Please call with questions and concerns.     STEFFANY Phillips, CNP  Hepatology [facility]  [phone]

## 2022-12-03 ENCOUNTER — APPOINTMENT (OUTPATIENT)
Dept: MRI IMAGING | Facility: CLINIC | Age: 57
DRG: 004 | End: 2022-12-03
Attending: NURSE PRACTITIONER
Payer: COMMERCIAL

## 2022-12-03 LAB
ALBUMIN SERPL-MCNC: 1.8 G/DL (ref 3.4–5)
ALP SERPL-CCNC: 240 U/L (ref 40–150)
ALT SERPL W P-5'-P-CCNC: 25 U/L (ref 0–70)
ANION GAP SERPL CALCULATED.3IONS-SCNC: 10 MMOL/L (ref 3–14)
AST SERPL W P-5'-P-CCNC: 13 U/L (ref 0–45)
BASOPHILS # BLD AUTO: 0 10E3/UL (ref 0–0.2)
BASOPHILS NFR BLD AUTO: 0 %
BILIRUB SERPL-MCNC: 1 MG/DL (ref 0.2–1.3)
BUN SERPL-MCNC: 75 MG/DL (ref 7–30)
CALCIUM SERPL-MCNC: 7.7 MG/DL (ref 8.5–10.1)
CHLORIDE BLD-SCNC: 102 MMOL/L (ref 94–109)
CO2 SERPL-SCNC: 25 MMOL/L (ref 20–32)
CREAT SERPL-MCNC: 3.14 MG/DL (ref 0.66–1.25)
EOSINOPHIL # BLD AUTO: 0 10E3/UL (ref 0–0.7)
EOSINOPHIL NFR BLD AUTO: 0 %
ERYTHROCYTE [DISTWIDTH] IN BLOOD BY AUTOMATED COUNT: 18.6 % (ref 10–15)
GFR SERPL CREATININE-BSD FRML MDRD: 22 ML/MIN/1.73M2
GLUCOSE BLD-MCNC: 210 MG/DL (ref 70–99)
GLUCOSE BLDC GLUCOMTR-MCNC: 187 MG/DL (ref 70–99)
GLUCOSE BLDC GLUCOMTR-MCNC: 213 MG/DL (ref 70–99)
GLUCOSE BLDC GLUCOMTR-MCNC: 217 MG/DL (ref 70–99)
GLUCOSE BLDC GLUCOMTR-MCNC: 225 MG/DL (ref 70–99)
GLUCOSE BLDC GLUCOMTR-MCNC: 231 MG/DL (ref 70–99)
GLUCOSE BLDC GLUCOMTR-MCNC: 243 MG/DL (ref 70–99)
HCT VFR BLD AUTO: 25.9 % (ref 40–53)
HGB BLD-MCNC: 8.1 G/DL (ref 13.3–17.7)
IMM GRANULOCYTES # BLD: 0.2 10E3/UL
IMM GRANULOCYTES NFR BLD: 1 %
LYMPHOCYTES # BLD AUTO: 0.6 10E3/UL (ref 0.8–5.3)
LYMPHOCYTES NFR BLD AUTO: 3 %
MCH RBC QN AUTO: 34 PG (ref 26.5–33)
MCHC RBC AUTO-ENTMCNC: 31.3 G/DL (ref 31.5–36.5)
MCV RBC AUTO: 109 FL (ref 78–100)
MONOCYTES # BLD AUTO: 0.8 10E3/UL (ref 0–1.3)
MONOCYTES NFR BLD AUTO: 4 %
NEUTROPHILS # BLD AUTO: 18.9 10E3/UL (ref 1.6–8.3)
NEUTROPHILS NFR BLD AUTO: 92 %
NRBC # BLD AUTO: 0 10E3/UL
NRBC BLD AUTO-RTO: 0 /100
PLATELET # BLD AUTO: 74 10E3/UL (ref 150–450)
POTASSIUM BLD-SCNC: 3.8 MMOL/L (ref 3.4–5.3)
PROT SERPL-MCNC: 5.1 G/DL (ref 6.8–8.8)
RBC # BLD AUTO: 2.38 10E6/UL (ref 4.4–5.9)
SODIUM SERPL-SCNC: 137 MMOL/L (ref 133–144)
UFH PPP CHRO-ACNC: 0.13 IU/ML
WBC # BLD AUTO: 20.5 10E3/UL (ref 4–11)

## 2022-12-03 PROCEDURE — 999N000253 HC STATISTIC WEANING TRIALS

## 2022-12-03 PROCEDURE — 999N000185 HC STATISTIC TRANSPORT TIME EA 15 MIN

## 2022-12-03 PROCEDURE — 72156 MRI NECK SPINE W/O & W/DYE: CPT

## 2022-12-03 PROCEDURE — 94640 AIRWAY INHALATION TREATMENT: CPT | Mod: 76

## 2022-12-03 PROCEDURE — 80053 COMPREHEN METABOLIC PANEL: CPT | Performed by: INTERNAL MEDICINE

## 2022-12-03 PROCEDURE — 258N000003 HC RX IP 258 OP 636: Performed by: INTERNAL MEDICINE

## 2022-12-03 PROCEDURE — 99291 CRITICAL CARE FIRST HOUR: CPT | Performed by: INTERNAL MEDICINE

## 2022-12-03 PROCEDURE — 99231 SBSQ HOSP IP/OBS SF/LOW 25: CPT | Performed by: INTERNAL MEDICINE

## 2022-12-03 PROCEDURE — 250N000013 HC RX MED GY IP 250 OP 250 PS 637: Performed by: PHYSICIAN ASSISTANT

## 2022-12-03 PROCEDURE — 94003 VENT MGMT INPAT SUBQ DAY: CPT

## 2022-12-03 PROCEDURE — 999N000157 HC STATISTIC RCP TIME EA 10 MIN

## 2022-12-03 PROCEDURE — 99291 CRITICAL CARE FIRST HOUR: CPT | Mod: GC | Performed by: PSYCHIATRY & NEUROLOGY

## 2022-12-03 PROCEDURE — 85520 HEPARIN ASSAY: CPT | Performed by: INTERNAL MEDICINE

## 2022-12-03 PROCEDURE — 85025 COMPLETE CBC W/AUTO DIFF WBC: CPT | Performed by: INTERNAL MEDICINE

## 2022-12-03 PROCEDURE — 250N000011 HC RX IP 250 OP 636: Performed by: PHYSICIAN ASSISTANT

## 2022-12-03 PROCEDURE — 250N000012 HC RX MED GY IP 250 OP 636 PS 637: Performed by: NURSE PRACTITIONER

## 2022-12-03 PROCEDURE — 250N000009 HC RX 250: Performed by: PHYSICIAN ASSISTANT

## 2022-12-03 PROCEDURE — A9585 GADOBUTROL INJECTION: HCPCS | Performed by: STUDENT IN AN ORGANIZED HEALTH CARE EDUCATION/TRAINING PROGRAM

## 2022-12-03 PROCEDURE — 250N000011 HC RX IP 250 OP 636: Performed by: INTERNAL MEDICINE

## 2022-12-03 PROCEDURE — 255N000002 HC RX 255 OP 636: Performed by: STUDENT IN AN ORGANIZED HEALTH CARE EDUCATION/TRAINING PROGRAM

## 2022-12-03 PROCEDURE — 200N000001 HC R&B ICU

## 2022-12-03 PROCEDURE — 250N000009 HC RX 250: Performed by: NURSE PRACTITIONER

## 2022-12-03 PROCEDURE — 94640 AIRWAY INHALATION TREATMENT: CPT

## 2022-12-03 RX ORDER — GADOBUTROL 604.72 MG/ML
7 INJECTION INTRAVENOUS ONCE
Status: COMPLETED | OUTPATIENT
Start: 2022-12-03 | End: 2022-12-03

## 2022-12-03 RX ADMIN — ACYCLOVIR: 50 OINTMENT TOPICAL at 05:54

## 2022-12-03 RX ADMIN — IPRATROPIUM BROMIDE AND ALBUTEROL SULFATE 3 ML: .5; 3 SOLUTION RESPIRATORY (INHALATION) at 07:34

## 2022-12-03 RX ADMIN — HYDROCORTISONE SODIUM SUCCINATE 50 MG: 100 INJECTION, POWDER, FOR SOLUTION INTRAMUSCULAR; INTRAVENOUS at 05:53

## 2022-12-03 RX ADMIN — ACYCLOVIR: 50 OINTMENT TOPICAL at 21:40

## 2022-12-03 RX ADMIN — FOLIC ACID 1 MG: 5 INJECTION, SOLUTION INTRAMUSCULAR; INTRAVENOUS; SUBCUTANEOUS at 09:51

## 2022-12-03 RX ADMIN — CHLORHEXIDINE GLUCONATE 0.12% ORAL RINSE 15 ML: 1.2 LIQUID ORAL at 20:12

## 2022-12-03 RX ADMIN — IPRATROPIUM BROMIDE AND ALBUTEROL SULFATE 3 ML: .5; 3 SOLUTION RESPIRATORY (INHALATION) at 19:45

## 2022-12-03 RX ADMIN — METOPROLOL TARTRATE 5 MG: 5 INJECTION INTRAVENOUS at 16:30

## 2022-12-03 RX ADMIN — Medication 0.03 MCG/KG/MIN: at 21:19

## 2022-12-03 RX ADMIN — HYDROCORTISONE SODIUM SUCCINATE 50 MG: 100 INJECTION, POWDER, FOR SOLUTION INTRAMUSCULAR; INTRAVENOUS at 11:59

## 2022-12-03 RX ADMIN — LACTULOSE 10 G: 20 SOLUTION ORAL at 21:36

## 2022-12-03 RX ADMIN — Medication 1 DROP: at 09:50

## 2022-12-03 RX ADMIN — IPRATROPIUM BROMIDE AND ALBUTEROL SULFATE 3 ML: .5; 3 SOLUTION RESPIRATORY (INHALATION) at 11:09

## 2022-12-03 RX ADMIN — Medication 15 ML: at 09:51

## 2022-12-03 RX ADMIN — THIAMINE HYDROCHLORIDE 100 MG: 100 INJECTION, SOLUTION INTRAMUSCULAR; INTRAVENOUS at 09:51

## 2022-12-03 RX ADMIN — Medication 40 MG: at 06:35

## 2022-12-03 RX ADMIN — HYDROCORTISONE SODIUM SUCCINATE 50 MG: 100 INJECTION, POWDER, FOR SOLUTION INTRAMUSCULAR; INTRAVENOUS at 20:11

## 2022-12-03 RX ADMIN — TACROLIMUS 1 MG: 5 CAPSULE ORAL at 20:12

## 2022-12-03 RX ADMIN — INSULIN ASPART 2 UNITS: 100 INJECTION, SOLUTION INTRAVENOUS; SUBCUTANEOUS at 04:13

## 2022-12-03 RX ADMIN — LACTULOSE 10 G: 20 SOLUTION ORAL at 09:51

## 2022-12-03 RX ADMIN — CHLORHEXIDINE GLUCONATE 0.12% ORAL RINSE 15 ML: 1.2 LIQUID ORAL at 08:00

## 2022-12-03 RX ADMIN — LACTULOSE 10 G: 20 SOLUTION ORAL at 16:30

## 2022-12-03 RX ADMIN — GADOBUTROL 7 ML: 604.72 INJECTION INTRAVENOUS at 19:09

## 2022-12-03 RX ADMIN — Medication 1 DROP: at 16:30

## 2022-12-03 RX ADMIN — INSULIN GLARGINE 10 UNITS: 100 INJECTION, SOLUTION SUBCUTANEOUS at 06:35

## 2022-12-03 RX ADMIN — INSULIN ASPART 5 UNITS: 100 INJECTION, SOLUTION INTRAVENOUS; SUBCUTANEOUS at 16:40

## 2022-12-03 RX ADMIN — TACROLIMUS 1 MG: 5 CAPSULE ORAL at 09:51

## 2022-12-03 RX ADMIN — INSULIN ASPART 3 UNITS: 100 INJECTION, SOLUTION INTRAVENOUS; SUBCUTANEOUS at 07:55

## 2022-12-03 RX ADMIN — HYDROCORTISONE SODIUM SUCCINATE 50 MG: 100 INJECTION, POWDER, FOR SOLUTION INTRAMUSCULAR; INTRAVENOUS at 00:33

## 2022-12-03 RX ADMIN — INSULIN ASPART 4 UNITS: 100 INJECTION, SOLUTION INTRAVENOUS; SUBCUTANEOUS at 19:55

## 2022-12-03 RX ADMIN — Medication 1 DROP: at 22:28

## 2022-12-03 RX ADMIN — ACYCLOVIR: 50 OINTMENT TOPICAL at 13:59

## 2022-12-03 RX ADMIN — INSULIN ASPART 4 UNITS: 100 INJECTION, SOLUTION INTRAVENOUS; SUBCUTANEOUS at 00:33

## 2022-12-03 RX ADMIN — HEPARIN SODIUM 1350 UNITS/HR: 10000 INJECTION, SOLUTION INTRAVENOUS at 05:07

## 2022-12-03 RX ADMIN — SODIUM CHLORIDE, POTASSIUM CHLORIDE, SODIUM LACTATE AND CALCIUM CHLORIDE 500 ML: 600; 310; 30; 20 INJECTION, SOLUTION INTRAVENOUS at 21:09

## 2022-12-03 RX ADMIN — IPRATROPIUM BROMIDE AND ALBUTEROL SULFATE 3 ML: .5; 3 SOLUTION RESPIRATORY (INHALATION) at 15:00

## 2022-12-03 RX ADMIN — INSULIN ASPART 4 UNITS: 100 INJECTION, SOLUTION INTRAVENOUS; SUBCUTANEOUS at 12:07

## 2022-12-03 ASSESSMENT — ACTIVITIES OF DAILY LIVING (ADL)
ADLS_ACUITY_SCORE: 49

## 2022-12-03 NOTE — PROGRESS NOTES
Renal Medicine Progress Note                                Blanco Osborne MRN# [id number]   Age: 57 year old YOB: 1965   Date of Admission: 11/12/2022 Hospital LOS: 21                  Assessment/Plan:     Blanco Osborne is a 57-year-old male with PMH CARRILLO s/p liver transplant (9/2016) and cirrhosis admitted 11/12/2022 with out of hospital PEA arrest and acute hypoxemic respiratory failure. Course complicated by parainfluenza virus, streptococcal bacteremia, and LORETTA requiring CRRT.      1.  Severe anuric LORETTA:             -CRRT stopped 11/22 and resume on 11/24 and stopped 11/26.    -IHD 11/29/30     2.  Septic shock with MODS:                -no current pressors                  3.  Respiratory failure:    -multiple re intubations    -R Pneumothorax    -Bilateral pleural effusions   -Aspergillus PNA             4.  ESLD due to CARRILLO s/p liver transplant                - tacrolimus on hold               - manage per ICU     5.  Hypoalbuminemia        Plan dialysis 12/04/22    Interval History:     Dialyzed yesterday   UF 0.8 liter     No UO   No sign of renal recovery      ROS:     Intubated and sedated: ROS unable    Medications and Allergies:     Reviewed    Physical Exam:     Vitals were reviewed  Patient Vitals for the past 8 hrs:   BP Temp Temp src Pulse Resp SpO2 Weight   12/03/22 0800 128/87 99.7  F (37.6  C) Axillary 113 16 97 % --   12/03/22 0734 -- -- -- -- -- 97 % --   12/03/22 0700 123/86 -- -- 115 23 97 % --   12/03/22 0600 91/63 -- -- 105 22 98 % 85.2 kg (187 lb 13.3 oz)   12/03/22 0500 107/43 -- -- 106 24 97 % --   12/03/22 0400 107/59 97.8  F (36.6  C) Axillary 106 10 94 % --   12/03/22 0315 -- -- -- -- -- 92 % --   12/03/22 0300 (!) 151/91 -- -- (!) 123 24 98 % --   12/03/22 0200 (!) 137/93 -- -- 116 19 93 % --     I/O last 3 completed shifts:  In: 1693.76 [I.V.:103.76; NG/GT:270]  Out: 1200 [Other:800; Stool:400]    Vitals:    11/29/22 0000 11/30/22 0400 12/01/22 0400 12/02/22  0600   Weight: 95.7 kg (210 lb 15.7 oz) 88.5 kg (195 lb 1.7 oz) 90.7 kg (199 lb 15.3 oz) 86.1 kg (189 lb 13.1 oz)    12/03/22 0600   Weight: 85.2 kg (187 lb 13.3 oz)       GENERAL:  intubated and sedated  HEENT: ETT  RESP:  clear anteriorly  CV: RRR, normal S1 S2  EXT: warm, no edema    Data:     Recent Labs   Lab 12/03/22  0751 12/03/22  0513 12/03/22  0409 12/03/22  0031 12/02/22  0851 12/02/22  0421 12/01/22  1549 12/01/22  1303 11/30/22  1201 11/30/22  0807   NA  --  137  --   --   --  138  --  139  --  140   POTASSIUM  --  3.8  --   --   --  4.1  --  3.7  --  4.3   CHLORIDE  --  102  --   --   --  103  --  104  --  105   CO2  --  25  --   --   --  23  --  22  --  24   ANIONGAP  --  10  --   --   --  12  --  13  --  11   * 210* 187* 225*   < > 219*   < > 220*   < > 143*   BUN  --  75*  --   --   --  95*  --  103*  --  73*   CR  --  3.14*  --   --   --  4.01*  --  4.10*  --  2.99*   GFRESTIMATED  --  22*  --   --   --  16*  --  16*  --  23*   KELLY  --  7.7*  --   --   --  8.2*  --  8.1*  --  8.0*    < > = values in this interval not displayed.         Recent Labs   Lab Test 12/03/22  0513 12/02/22  0421 12/01/22  1303 11/30/22  0807 11/29/22  0439 11/28/22  0504 11/27/22  0447 11/26/22  1121 11/26/22  0403 11/25/22  1947   CR 3.14* 4.01* 4.10* 2.99* 3.74* 2.86* 2.02* 1.29* 1.28* 1.43*     Recent Labs   Lab 12/03/22  0513 11/29/22  0439 11/28/22  0504 11/27/22  0447   ALBUMIN 1.8* 1.9* 2.0* 2.0*     Recent Labs   Lab 12/03/22  0513 12/02/22  1352 12/01/22  1303 11/30/22  0807 11/29/22  0439 11/28/22  0504 11/27/22  0447 11/26/22  1121   PHOS  --   --   --   --  4.2 3.7 4.3 4.5   HGB 8.1* 10.4* 9.2* 7.9* 7.4* 8.3* 8.3* 9.5*         G Jose Reilly MD    [facility] - Nephrology  [phone]

## 2022-12-03 NOTE — PROGRESS NOTES
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  Likely secondary to acute respiratory failure and COPD exacerbation and parainfluenza and strep pneumo pneumonias.  He had 2 failed extubation and subsequently received a tracheostomy.  He also has acute renal failure and is dialysis dependent.status has waxed and waned.  In the last few days has been much more altered.  Has received some sedating medications but we also have discovered hyperammonemia probably related to worsening of his underlying liver disease.  Family noting more weakness today      Assessment and plan :     Blanco Osborne IS a 57 year old male admitted on 11/12/2022 for acute respiratory failure.   I have personally reviewed the daily labs, imaging studies, cultures and discussed the case with referring physician and consulting physicians.     My assessment and plan by system for this patient is as follows:    Neurology/Psychiatry:   Encephalopathy.  Multifactorial.  No evidence of major stroke on MRI or CT.  Question of critical illness myopathy versus new spinal cord issue.  -Continue to hold all sedatives.  -Continue lactulose.  -Neurology has  recommended holding heparin today and getting a spine MRI.  Further management pending these results.  -Up in chair and physical therapy    Cardiovascular:   1.  Status post PEA arrest 20 minutes of CPR.  Not a primary cardiac event.  Most likely secondary to respiratory failure. FRANKLIN was normal.  Was in shock which is now resolved.  Lipids and hemoglobin A1c were both within normal limits.      Pulmonary/Ventilator Management:   Acute respiratory failure and ARDS secondary to viral and bacterial pneumonia -improved.  Reintubated twice secondary to same as well as weakness encephalopathy and inability to manage secretions.Also with pneumothorax status post chest tube has been removed.   Tracheostomy was done on 11/29.  Had to be urgently replaced because of malfunctioning cuff.  Exchanged for an XLT.  -Pressure support alternating with trach dome.  Planning for LTAC ongoing.    GI and Nutrition :   1.  Status post liver transplant with evidence of progression of chronic liver disease.  On chronic tacrolimus.  Follows at the [facility].  Have discussed extensively with transplant service who noted that patient had some fibrosis in 2017, so this is likely progression of same.  They say treatment for this process would have resolved working on metabolic syndrome and complete abstinence from alcohol.  Patient was lost to follow-up for 2 years.   2.  Tolerating enteral nutrition   -Awaiting results of liver biopsy to help with prognostication.  -Tacrolimus per transplant service   -Enteral nutrition   -Lactulose       Renal/Fluids/Electrolytes:   1.  Acute renal failure now dialysis dependent.  No indication as of yet return of renal function.  Intermittent dialysis ongoing    Infectious Disease:   1.  Streptococcal pneumonia as well as parainfluenza as well as possible Aspergillus thought less likely by infectious disease consultant.  Also herpes labialis infection.  Completed antibiotics.  -Acyclovir for 21-day  course  -Tacrolimus being dosed by transplant nephrology    Endocrine:   1. Glucoses ok on coverage.  On hydrocortisone.  -Continue hydrocortisone for now.  Tacrolimus not yet therapeutic.  Will taper when is at therapeutic level.    Hematology/Oncology:   1.  Anemia of critical illness and also secondary to kidney disease; .  Thrombocytopenia unclear etiology.  Neurologist had recommended anticoagulation for paroxysmal atrial fibrillation and the potential of embolic strokes.  -Resume therapeutic heparin if spine MRI not concerning.       IV/Access:   1. Venous access -   3.  Plan  -Keep in for now while we are adjusting heparin.  Remove once we get to steady state.      ICU Prophylaxis:   1. DVT: Hep Subq/ LMWH/mechanical  2. VAP: HOB 30 degrees, chlorhexidine rinse  3. Stress Ulcer: PPI/H2 blocker  4. Restraints: Nonviolent soft two point restraints required and necessary for patient safety and continued cares and good effect as patient continues to pull at necessary lines, tubes despite education and distraction. Will readdress daily.   5. IV Access - central access required and necessary for continued patient cares.  Will discuss daily with nursing  6. Feeding - enteral   7. Family Update: Conference with siblings spouse and close friends as well as parents and parent in law on 12/2.  I discussed more guarded prognosis for patients who have chronic liver disease and to require long-term mechanical ventilation.  Family fairly united in their opinion that patient would want to continue with aggressive care in the short-term at least.  Otherwise's bedside daily.  8. Disposition - ICU care.  Patient very well may be ready for LTAC midweek.      Key goals for next 24 hours:   1.Continue off sedation and continue on lactulose   2.  Pressure support trial and trach dome and up in chair if possible  3.  Resume heparin pending results of MRI.  4.  Disposition planning to resume next week pending patient's clinical  condition.         Key Medications:       - MEDICATION INSTRUCTIONS for Dialysis Patients -   Does not apply See Admin Instructions     acyclovir   Topical Q8H     artificial tears  1 drop Both Eyes TID     chlorhexidine  15 mL Mouth/Throat Q12H     folic acid  1 mg Intravenous Daily     heparin lock flush  5-20 mL Intracatheter Q24H     hydrocortisone sodium succinate PF  50 mg Intravenous Q6H     insulin aspart  1-12 Units Subcutaneous Q4H     insulin glargine  10 Units Subcutaneous QAM AC     ipratropium - albuterol 0.5 mg/2.5 mg/3 mL  3 mL Nebulization 4x daily     lactulose  10 g Oral TID     multivitamins w/minerals  15 mL Per Feeding Tube Daily     pantoprazole  40 mg Per Feeding Tube QAM AC    Or     pantoprazole  40 mg Intravenous QAM AC     polyethylene glycol  17 g Oral Daily     senna-docusate  1 tablet Oral BID    Or     senna-docusate  2 tablet Oral BID     sodium chloride (PF)  10-40 mL Intracatheter Q8H     tacrolimus  1 mg Oral or Feeding Tube BID     thiamine  100 mg Intravenous Daily       dextrose       heparin Stopped (12/03/22 0945)     - MEDICATION INSTRUCTIONS -       - MEDICATION INSTRUCTIONS -       norepinephrine       phenylephrine Stopped (11/29/22 0500)     sodium chloride Stopped (12/01/22 1000)     sodium chloride 0 mL/hr at 11/22/22 0742     vasopressin                 Physical Examination:   Temp:  [97.5  F (36.4  C)-99.7  F (37.6  C)] 99.2  F (37.3  C)  Pulse:  [105-128] 125  Resp:  [10-25] 25  BP: ()/() 147/105  FiO2 (%):  [30 %-45 %] 40 %  SpO2:  [89 %-99 %] 94 %           Intake/Output Summary (Last 24 hours) at 12/3/2022 1640  Last data filed at 12/3/2022 1400  Gross per 24 hour   Intake 1571.08 ml   Output 100 ml   Net 1471.08 ml         Wt Readings from Last 4 Encounters:   12/03/22 85.2 kg (187 lb 13.3 oz)   10/07/19 137.5 kg (303 lb 3.2 oz)   10/04/19 131.5 kg (290 lb)   02/20/19 140.4 kg (309 lb 9.6 oz)     BP - Mean:  [] 116  Vent Mode: Trach  collar  FiO2 (%): 40 %  Resp Rate (Set): 16 breaths/min  Tidal Volume (Set, mL): 450 mL  PEEP (cm H2O): 5 cmH2O  Pressure Support (cm H2O): 10 cmH2O  Resp: 25    Recent Labs   Lab 11/30/22  0955   O2PER 30       GEN: Intubated, eyes open.  No acute distress  HEENT: Pulls reactive, trach ostomy site looks clean, healing scabs from HSV that changed   PULM: Right-sided chest tube bandages clean, comfortable on pressure support and trach dome   CV/COR: RRR S1S2 no gallop,  No rub, no murmur  ABD: soft nontender, nondistended, PEG tube site looks clean  EXT: Anasarca  NEURO: Eyes open.  Moving head spontaneously.  Definitely more alert today grimaces to pain but does not move   SKIN: no obvious rash; no cyanosis or mottling   LINES: clean, dry intact dialysis catheter         Data:   All data and imaging reviewed     ROUTINE ICU LABS (Last four results)  CMP  Recent Labs   Lab 12/03/22  1205 12/03/22  0751 12/03/22  0513 12/03/22  0409 12/02/22  0851 12/02/22  0421 12/01/22  1549 12/01/22  1303 11/30/22  1201 11/30/22  0807 11/29/22  0737 11/29/22  0439 11/28/22  0508 11/28/22  0504 11/27/22  0921 11/27/22  0447   NA  --   --  137  --   --  138  --  139  --  140  --  138  --  140  --  139   POTASSIUM  --   --  3.8  --   --  4.1  --  3.7  --  4.3  --  4.4  --  4.3  --  4.1   CHLORIDE  --   --  102  --   --  103  --  104  --  105  --  105  --  105  --  107   CO2  --   --  25  --   --  23  --  22  --  24  --  24  --  23  --  24   ANIONGAP  --   --  10  --   --  12  --  13  --  11  --  9  --  12  --  8   * 213* 210* 187*   < > 219*   < > 220*   < > 143*   < > 149*   < > 245*   < > 175*   BUN  --   --  75*  --   --  95*  --  103*  --  73*  --  100*  --  83*  --  56*   CR  --   --  3.14*  --   --  4.01*  --  4.10*  --  2.99*  --  3.74*  --  2.86*  --  2.02*   GFRESTIMATED  --   --  22*  --   --  16*  --  16*  --  23*  --  18*  --  25*  --  38*   KELLY  --   --  7.7*  --   --  8.2*  --  8.1*  --  8.0*  --  7.9*  --  7.9*   --  7.7*   MAG  --   --   --   --   --   --   --   --   --   --   --  2.3  --   --   --  2.1   PHOS  --   --   --   --   --   --   --   --   --   --   --  4.2  --  3.7  --  4.3   PROTTOTAL  --   --  5.1*  --   --   --   --   --   --   --   --  4.9*  --  5.1*  --  5.0*   ALBUMIN  --   --  1.8*  --   --   --   --   --   --   --   --  1.9*  --  2.0*  --  2.0*   BILITOTAL  --   --  1.0  --   --   --   --   --   --   --   --  0.9  --  1.0  --  1.1   ALKPHOS  --   --  240*  --   --   --   --   --   --   --   --  189*  --  250*  --  177*   AST  --   --  13  --   --   --   --   --   --   --   --  19  --  23  --  33   ALT  --   --  25  --   --   --   --   --   --   --   --  24  --  30  --  29    < > = values in this interval not displayed.     CBC  Recent Labs   Lab 12/03/22  0513 12/02/22  1352 12/01/22  1303 11/30/22  0807   WBC 20.5* 22.2* 19.7* 10.7   RBC 2.38* 3.06* 2.73* 2.33*   HGB 8.1* 10.4* 9.2* 7.9*   HCT 25.9* 32.8* 29.3* 24.1*   * 107* 107* 103*   MCH 34.0* 34.0* 33.7* 33.9*   MCHC 31.3* 31.7 31.4* 32.8   RDW 18.6* 19.2* 19.6* 19.7*   PLT 74* 113* 117* 66*     INR  Recent Labs   Lab 12/01/22  1303 11/29/22  1402   INR 1.17* 1.26*     Arterial Blood Gas  Recent Labs   Lab 11/30/22  0955   O2PER 30       All cultures:  No results for input(s): CULT in the last 168 hours.      Laura Fernandes MD    Billing: This patient is critically ill: Yes. Total critical care time today 40 minutes excluding time for procedures.

## 2022-12-03 NOTE — PROGRESS NOTES
Neuro: Open eyes spontaneously. No meaningful eye contact. Not following/tracking. Pupils equal and reactive  Pulm: Coarse LS. Trach dome all day  CV: SD/ST. BP WNL  : HD today. 800ml out  GI: Rectal tube patent with loose BMs. On lactulose. On TF via NG.   Extremities: No movements with stimuli  Skin: Skin tears, scabs, and bruises on chest and BUE. Crusty on nose. Lesions on lips and tongue.   Lines: PICC on RUE. PIVx1 SL on LUE  Family: Updated at bedside  Plan: Continue to monitor and support.

## 2022-12-03 NOTE — PROGRESS NOTES
End of Shift Summary:  Assumed care of patient at 2330  Neuro- Opens eyes spontaneously, does not track of follow. PERRL     Resp- Pt placed back on previous vent settings for tachycardia, tachypenea, work of breathing appeared to be increasing, vent settings now 16/450/40%/5 , moderate inline secretions through trach- red tinged and thick    Cardiac- Pt tachycardic 110-120s, sinus dysrhythmia, afebrile MAP >65, afebrile     GI-  BGs slightly elevated given sliding scale insulin for coverage TF @ 50ml/hr with q4 30ml FWF, rectal tube in place    - aneuric     Integ/Msk- see wound/skin assessments     Lines/Gtts- 3 lumen PICC, Heparin @ 1350U    Labs- Anti XA- heparin increased by 150U to 1350 and given bolus per order, redraw ordered     WCTM

## 2022-12-03 NOTE — PLAN OF CARE
Neuro: Not tracking or responding the beginning of the shift. In the late morning, appeared to be tracking people across the room. Witness him moving his feet once. Grimaced to pain on all extremities for only one assessment. Does not withdraw to pain. In the evening, pt appeared to get tired and was no longer tracking people across the room. Decreased alertness.  Cardio: Metoprolol given for tachy. BP WDL  Resp: Trach dome all day. LS course  GI: Peg patent and intact  : Aneuric  Activity: Sat in chair this afternoon  Skin: Multiple skin tears on neck. Wounds cleaned and dressed. Around 1600 noticed his palms are flushed and red. This was reported to Dr. Fernandes. No interventions ordered.

## 2022-12-03 NOTE — PROGRESS NOTES
[facility]    Stroke/Neurocritical Care Progress Note    Interval Events- NAEON    HPI Summary  Blanco Osborne is a 56 y/o male with a PMHx of CARRILLO s/p liver transplant 09/2016 and cirrhosis admitted on 11/12/22 with out of hospital PEA arrest and acute hypoxemic respiratory failure. His hospital course has been complicated by parainfluenza virus, streptococcal bacteremia, and LORETTA requiring CRRT/HD. He is s/p trach/PEG.     Stroke Evaluation Summarized    MRI/Head CT CT 12/1: no acute pathology or significant interval change    MRI 11/17: multifocal punctate acute/subacute infarcts    Intracranial Vasculature N/A: unable to get contrasted scan d/t LORETTA   Cervical Vasculature N/A: unable to get contrasted scan d/t LORETTA     Echocardiogram EF 55-60%, mild tricuspid and aortic regurg; large pleural effusion and ascites, sinus keon    EKG/Telemetry SR w/ bigeminy and short AK   Other Testing Not Applicable      LDL  11/20/2022: 80 mg/dL   A1C  11/19/2022: 4.7 %   Troponin No lab value available in past 48 hrs       Impression/Plan  # Hepatic encephalopathy. Ongoing encephalopathy likely multifactorial, recently elevated ammonia. CT head reassuring (stable, no acute pathology). Previous MRI without obvious anoxic injury (11/17). EEG 11/13-11/14 with generalized burst suppression, no seizures. Small multifocal strokes would not explain degree of encephalopathy.   - Continue supportive cares  - Minimize sedation, delirium precautions (screens open during day, sitting in chair as much as possible during day, day/night cycle, etc)  - Given degree quadriparesis seen on exam, out of proportion to critical illness polyneuropathy/myopathy - MRI C spine w/wo (DWI sequences) ordered to r/o myelopathy - holding hep gtt until after MRI    # Small multifocal ischemic strokes likely 2/2 pAfib, vs suspected d/t cardiac arrest. Unable to perform vessel imaging d/t LORETTA.   - US carotid with < 50% stenosis  "bilat  - Can start ASA 81 mg vs therapeutic AC if Primary Team is okay with AC for secondary stroke prevention   - Ultimately would need long-term PO AC for secondary stroke prevention (pAfib) - defer timing to primary team    Patient Follow-up    - final recommendation pending work-up    We will continue to follow peripherally for results of MRI C spine.     Maurice Benjamin MD  Vascular Neurology Fellow  To page me or covering stroke neurology team member, click here: AMCOM   Choose \"On Call\" tab at top, then search dropdown box for \"Neurology Adult\", select location, press Enter, then look for stroke/neuro ICU/telestroke.    ______________________________________________________    Clinically Significant Risk Factors Present on Admission                  Medications   Scheduled Meds    - MEDICATION INSTRUCTIONS for Dialysis Patients -   Does not apply See Admin Instructions     acyclovir   Topical Q8H     artificial tears  1 drop Both Eyes TID     chlorhexidine  15 mL Mouth/Throat Q12H     folic acid  1 mg Intravenous Daily     heparin lock flush  5-20 mL Intracatheter Q24H     hydrocortisone sodium succinate PF  50 mg Intravenous Q6H     insulin aspart  1-12 Units Subcutaneous Q4H     insulin glargine  10 Units Subcutaneous QAM AC     ipratropium - albuterol 0.5 mg/2.5 mg/3 mL  3 mL Nebulization 4x daily     lactulose  10 g Oral TID     multivitamins w/minerals  15 mL Per Feeding Tube Daily     pantoprazole  40 mg Per Feeding Tube QAM AC    Or     pantoprazole  40 mg Intravenous QAM AC     polyethylene glycol  17 g Oral Daily     senna-docusate  1 tablet Oral BID    Or     senna-docusate  2 tablet Oral BID     sodium chloride (PF)  10-40 mL Intracatheter Q8H     tacrolimus  1 mg Oral or Feeding Tube BID     thiamine  100 mg Intravenous Daily       Infusion Meds    dextrose       heparin Stopped (12/03/22 0945)     - MEDICATION INSTRUCTIONS -       - MEDICATION INSTRUCTIONS -       norepinephrine       phenylephrine " Stopped (11/29/22 0500)     sodium chloride Stopped (12/01/22 1000)     sodium chloride 0 mL/hr at 11/22/22 0742     vasopressin         PRN Meds  albuterol, dextrose, glucose **OR** dextrose **OR** glucagon, heparin lock flush, - MEDICATION INSTRUCTIONS -, - MEDICATION INSTRUCTIONS -, metoprolol, naloxone **OR** naloxone **OR** naloxone **OR** naloxone, ondansetron **OR** ondansetron, prochlorperazine **OR** prochlorperazine **OR** prochlorperazine, promethazine, sodium chloride (PF), sodium chloride (PF)       PHYSICAL EXAMINATION  Temp:  [97.5  F (36.4  C)-99.7  F (37.6  C)] 99.3  F (37.4  C)  Pulse:  [105-128] 121  Resp:  [10-26] 21  BP: ()/() 138/101  FiO2 (%):  [30 %-45 %] 40 %  SpO2:  [89 %-100 %] 99 %      Neurologic  MS: E4, V2, M1. Visually responds to voice, looks for voice.  CN: PERRL, horizontal eye movements appear intact, symmetric facial features, does not BTT in any visual field  Motor: Does not move any limb to nox stim  Sensory: Does not sense pain in any extremity, centrally    Imaging  I personally reviewed all imaging; relevant findings per HPI.     Lab Results Data   CBC  Recent Labs   Lab 12/03/22  0513 12/02/22  1352 12/01/22  1303   WBC 20.5* 22.2* 19.7*   RBC 2.38* 3.06* 2.73*   HGB 8.1* 10.4* 9.2*   HCT 25.9* 32.8* 29.3*   PLT 74* 113* 117*     Basic Metabolic Panel    Recent Labs   Lab 12/03/22  1205 12/03/22  0751 12/03/22  0513 12/02/22  0851 12/02/22  0421 12/01/22  1549 12/01/22  1303   NA  --   --  137  --  138  --  139   POTASSIUM  --   --  3.8  --  4.1  --  3.7   CHLORIDE  --   --  102  --  103  --  104   CO2  --   --  25  --  23  --  22   BUN  --   --  75*  --  95*  --  103*   CR  --   --  3.14*  --  4.01*  --  4.10*   * 213* 210*   < > 219*   < > 220*   KELLY  --   --  7.7*  --  8.2*  --  8.1*    < > = values in this interval not displayed.     Liver Panel  Recent Labs   Lab 12/03/22  0513 11/29/22  0439 11/28/22  0504   PROTTOTAL 5.1* 4.9* 5.1*   ALBUMIN 1.8*  1.9* 2.0*   BILITOTAL 1.0 0.9 1.0   ALKPHOS 240* 189* 250*   AST 13 19 23   ALT 25 24 30     INR    Recent Labs   Lab Test 12/01/22  1303 11/29/22  1402 11/14/22  1020   INR 1.17* 1.26* 1.50*      Lipid Profile    Recent Labs   Lab Test 11/20/22  1151 04/20/20  1157 08/01/19  1500 01/08/19  0840 10/31/17  1037   CHOL 121 161  --   --  134   HDL 22* 42  --   --  57   LDL 80 81  --   --  58   TRIG 94 192* 177*   < > 92    < > = values in this interval not displayed.     A1C    Recent Labs   Lab Test 11/19/22  0212 09/22/16  0741   A1C 4.7 4.8     Troponin  No results for input(s): CTROPT, TROPONINIS, TROPONINI, GHTROP in the last 168 hours.       Data

## 2022-12-04 PROBLEM — G72.81 CRITICAL ILLNESS MYOPATHY: Status: ACTIVE | Noted: 2022-12-04

## 2022-12-04 LAB
ANION GAP SERPL CALCULATED.3IONS-SCNC: 14 MMOL/L (ref 3–14)
BUN SERPL-MCNC: 107 MG/DL (ref 7–30)
CALCIUM SERPL-MCNC: 7.9 MG/DL (ref 8.5–10.1)
CHLORIDE BLD-SCNC: 102 MMOL/L (ref 94–109)
CO2 SERPL-SCNC: 22 MMOL/L (ref 20–32)
CREAT SERPL-MCNC: 4.04 MG/DL (ref 0.66–1.25)
GFR SERPL CREATININE-BSD FRML MDRD: 16 ML/MIN/1.73M2
GLUCOSE BLD-MCNC: 234 MG/DL (ref 70–99)
GLUCOSE BLDC GLUCOMTR-MCNC: 172 MG/DL (ref 70–99)
GLUCOSE BLDC GLUCOMTR-MCNC: 176 MG/DL (ref 70–99)
GLUCOSE BLDC GLUCOMTR-MCNC: 188 MG/DL (ref 70–99)
GLUCOSE BLDC GLUCOMTR-MCNC: 207 MG/DL (ref 70–99)
GLUCOSE BLDC GLUCOMTR-MCNC: 226 MG/DL (ref 70–99)
GLUCOSE BLDC GLUCOMTR-MCNC: 246 MG/DL (ref 70–99)
POTASSIUM BLD-SCNC: 3.7 MMOL/L (ref 3.4–5.3)
SODIUM SERPL-SCNC: 138 MMOL/L (ref 133–144)
UFH PPP CHRO-ACNC: 0.1 IU/ML
UFH PPP CHRO-ACNC: 0.2 IU/ML
UFH PPP CHRO-ACNC: 0.27 IU/ML
UFH PPP CHRO-ACNC: 0.44 IU/ML
UFH PPP CHRO-ACNC: >1.1 IU/ML

## 2022-12-04 PROCEDURE — 94640 AIRWAY INHALATION TREATMENT: CPT | Mod: 76

## 2022-12-04 PROCEDURE — 250N000013 HC RX MED GY IP 250 OP 250 PS 637: Performed by: PHYSICIAN ASSISTANT

## 2022-12-04 PROCEDURE — 250N000009 HC RX 250: Performed by: NURSE PRACTITIONER

## 2022-12-04 PROCEDURE — 82374 ASSAY BLOOD CARBON DIOXIDE: CPT | Performed by: INTERNAL MEDICINE

## 2022-12-04 PROCEDURE — 85520 HEPARIN ASSAY: CPT | Performed by: INTERNAL MEDICINE

## 2022-12-04 PROCEDURE — 999N000157 HC STATISTIC RCP TIME EA 10 MIN

## 2022-12-04 PROCEDURE — 250N000011 HC RX IP 250 OP 636: Performed by: PHYSICIAN ASSISTANT

## 2022-12-04 PROCEDURE — 99231 SBSQ HOSP IP/OBS SF/LOW 25: CPT | Performed by: INTERNAL MEDICINE

## 2022-12-04 PROCEDURE — 250N000009 HC RX 250: Performed by: PHYSICIAN ASSISTANT

## 2022-12-04 PROCEDURE — 250N000012 HC RX MED GY IP 250 OP 636 PS 637: Performed by: NURSE PRACTITIONER

## 2022-12-04 PROCEDURE — 200N000001 HC R&B ICU

## 2022-12-04 PROCEDURE — 999N000009 HC STATISTIC AIRWAY CARE

## 2022-12-04 PROCEDURE — 634N000001 HC RX 634: Performed by: INTERNAL MEDICINE

## 2022-12-04 PROCEDURE — 250N000012 HC RX MED GY IP 250 OP 636 PS 637: Performed by: PHYSICIAN ASSISTANT

## 2022-12-04 PROCEDURE — 99291 CRITICAL CARE FIRST HOUR: CPT | Performed by: INTERNAL MEDICINE

## 2022-12-04 PROCEDURE — 90937 HEMODIALYSIS REPEATED EVAL: CPT

## 2022-12-04 PROCEDURE — 94003 VENT MGMT INPAT SUBQ DAY: CPT

## 2022-12-04 PROCEDURE — 250N000011 HC RX IP 250 OP 636: Performed by: INTERNAL MEDICINE

## 2022-12-04 PROCEDURE — 94640 AIRWAY INHALATION TREATMENT: CPT

## 2022-12-04 RX ADMIN — FOLIC ACID 1 MG: 5 INJECTION, SOLUTION INTRAMUSCULAR; INTRAVENOUS; SUBCUTANEOUS at 09:39

## 2022-12-04 RX ADMIN — HYDROCORTISONE SODIUM SUCCINATE 50 MG: 100 INJECTION, POWDER, FOR SOLUTION INTRAMUSCULAR; INTRAVENOUS at 00:00

## 2022-12-04 RX ADMIN — TACROLIMUS 1 MG: 5 CAPSULE ORAL at 09:40

## 2022-12-04 RX ADMIN — METOPROLOL TARTRATE 5 MG: 5 INJECTION INTRAVENOUS at 19:44

## 2022-12-04 RX ADMIN — INSULIN ASPART 2 UNITS: 100 INJECTION, SOLUTION INTRAVENOUS; SUBCUTANEOUS at 20:03

## 2022-12-04 RX ADMIN — Medication 15 ML: at 09:39

## 2022-12-04 RX ADMIN — LACTULOSE 10 G: 20 SOLUTION ORAL at 09:39

## 2022-12-04 RX ADMIN — HEPARIN SODIUM 1500 UNITS/HR: 10000 INJECTION, SOLUTION INTRAVENOUS at 20:35

## 2022-12-04 RX ADMIN — CHLORHEXIDINE GLUCONATE 0.12% ORAL RINSE 15 ML: 1.2 LIQUID ORAL at 09:40

## 2022-12-04 RX ADMIN — Medication 40 MG: at 09:39

## 2022-12-04 RX ADMIN — ACYCLOVIR: 50 OINTMENT TOPICAL at 21:27

## 2022-12-04 RX ADMIN — LACTULOSE 10 G: 20 SOLUTION ORAL at 16:16

## 2022-12-04 RX ADMIN — IPRATROPIUM BROMIDE AND ALBUTEROL SULFATE 3 ML: .5; 3 SOLUTION RESPIRATORY (INHALATION) at 07:22

## 2022-12-04 RX ADMIN — Medication 1 DROP: at 21:26

## 2022-12-04 RX ADMIN — HYDROCORTISONE SODIUM SUCCINATE 50 MG: 100 INJECTION, POWDER, FOR SOLUTION INTRAMUSCULAR; INTRAVENOUS at 13:45

## 2022-12-04 RX ADMIN — ACYCLOVIR: 50 OINTMENT TOPICAL at 06:04

## 2022-12-04 RX ADMIN — INSULIN ASPART 3 UNITS: 100 INJECTION, SOLUTION INTRAVENOUS; SUBCUTANEOUS at 00:01

## 2022-12-04 RX ADMIN — EPOETIN ALFA-EPBX 2000 UNITS: 2000 INJECTION, SOLUTION INTRAVENOUS; SUBCUTANEOUS at 10:40

## 2022-12-04 RX ADMIN — IPRATROPIUM BROMIDE AND ALBUTEROL SULFATE 3 ML: .5; 3 SOLUTION RESPIRATORY (INHALATION) at 15:41

## 2022-12-04 RX ADMIN — THIAMINE HYDROCHLORIDE 100 MG: 100 INJECTION, SOLUTION INTRAMUSCULAR; INTRAVENOUS at 09:37

## 2022-12-04 RX ADMIN — LACTULOSE 10 G: 20 SOLUTION ORAL at 21:27

## 2022-12-04 RX ADMIN — TACROLIMUS 1 MG: 5 CAPSULE ORAL at 20:36

## 2022-12-04 RX ADMIN — INSULIN ASPART 2 UNITS: 100 INJECTION, SOLUTION INTRAVENOUS; SUBCUTANEOUS at 03:55

## 2022-12-04 RX ADMIN — HYDROCORTISONE SODIUM SUCCINATE 50 MG: 100 INJECTION, POWDER, FOR SOLUTION INTRAMUSCULAR; INTRAVENOUS at 20:05

## 2022-12-04 RX ADMIN — HYDROCORTISONE SODIUM SUCCINATE 50 MG: 100 INJECTION, POWDER, FOR SOLUTION INTRAMUSCULAR; INTRAVENOUS at 06:04

## 2022-12-04 RX ADMIN — INSULIN ASPART 5 UNITS: 100 INJECTION, SOLUTION INTRAVENOUS; SUBCUTANEOUS at 16:23

## 2022-12-04 RX ADMIN — INSULIN ASPART 4 UNITS: 100 INJECTION, SOLUTION INTRAVENOUS; SUBCUTANEOUS at 09:53

## 2022-12-04 RX ADMIN — ACYCLOVIR: 50 OINTMENT TOPICAL at 13:48

## 2022-12-04 RX ADMIN — CHLORHEXIDINE GLUCONATE 0.12% ORAL RINSE 15 ML: 1.2 LIQUID ORAL at 19:44

## 2022-12-04 RX ADMIN — Medication 1 DROP: at 09:38

## 2022-12-04 RX ADMIN — INSULIN GLARGINE 10 UNITS: 100 INJECTION, SOLUTION SUBCUTANEOUS at 09:54

## 2022-12-04 RX ADMIN — IPRATROPIUM BROMIDE AND ALBUTEROL SULFATE 3 ML: .5; 3 SOLUTION RESPIRATORY (INHALATION) at 19:45

## 2022-12-04 RX ADMIN — IPRATROPIUM BROMIDE AND ALBUTEROL SULFATE 3 ML: .5; 3 SOLUTION RESPIRATORY (INHALATION) at 11:27

## 2022-12-04 RX ADMIN — INSULIN ASPART 2 UNITS: 100 INJECTION, SOLUTION INTRAVENOUS; SUBCUTANEOUS at 13:00

## 2022-12-04 RX ADMIN — HEPARIN SODIUM 1500 UNITS/HR: 10000 INJECTION, SOLUTION INTRAVENOUS at 05:04

## 2022-12-04 RX ADMIN — Medication 1 DROP: at 16:16

## 2022-12-04 ASSESSMENT — ACTIVITIES OF DAILY LIVING (ADL)
ADLS_ACUITY_SCORE: 49

## 2022-12-04 NOTE — PROGRESS NOTES
Potassium   Date Value Ref Range Status   12/03/2022 3.8 3.4 - 5.3 mmol/L Final   04/20/2020 3.9 3.4 - 5.3 mmol/L Final     Hemoglobin   Date Value Ref Range Status   12/03/2022 8.1 (L) 13.3 - 17.7 g/dL Final   04/20/2020 14.3 13.3 - 17.7 g/dL Final     Creatinine   Date Value Ref Range Status   12/03/2022 3.14 (H) 0.66 - 1.25 mg/dL Final   04/20/2020 1.06 0.66 - 1.25 mg/dL Final     Urea Nitrogen   Date Value Ref Range Status   12/03/2022 75 (H) 7 - 30 mg/dL Final   04/20/2020 23 7 - 30 mg/dL Final     Sodium   Date Value Ref Range Status   12/03/2022 137 133 - 144 mmol/L Final   04/20/2020 139 133 - 144 mmol/L Final     INR   Date Value Ref Range Status   12/01/2022 1.17 (H) 0.85 - 1.15 Final   10/07/2019 1.20 (H) 0.86 - 1.14 Final      Latest Reference Range & Units 11/14/22 11:41   Hep B Surface Agn Nonreactive  Nonreactive   Hepatitis B Surface Antibody Instrument Value <8.00 m[IU]/mL 0.00   Hepatitis B Surface Antibody  Nonreactive     DIALYSIS PROCEDURE NOTE  Hepatitis status of previous patient on machine log was checked and verified ok to use with this patients hepatitis status.  Patient dialyzed for 3.5 hrs. on a K3 bath with a net fluid removal of  1.5L.  A BFR of 400 ml/min was obtained via a CVC Tunelled.The treatment plan was discussed with Dr. Carrasquillo during the treatment.    Total heparin received during the treatment: 0 units.       Line flushed, clamped and capped with heparin 1:1000 1.3 mL (1300 units) per lumen    Meds  given: epogen 2000 units   Complications: UF paused for five minutes than restarted. No further complications.       Person educated: patient. Knowledge base . Barriers to learning: trached. Educated on procedure via verbal mode. Patient verbalized understanding. Pt prefers oral education style.     ICEBOAT? Timeout performed pre-treatment  I: Patient was identified using 2 identifiers  C:  Consent Signed Yes  E: Equipment preventative maintenance is current and dialysis delivery  system OK to use  B: Hepatitis B Surface Antigen: NONREACTIVE; Draw Date: 11/14/2022      Hepatitis B Surface Antibody: SUSCEPTIBLE; Draw Date: 11/14/2022  O: Dialysis orders present and complete prior to treatment  A: Vascular access verified and assessed prior to treatment  T: Treatment was performed at a clinically appropriate time  ?: Patient was allowed to ask questions and address concerns prior to treatment  See Adult Hemodialysis flowsheet in [facility] for further details and post assessment.  Machine water alarm in place and functioning. Transducer pods intact and checked every 15min.   Pt dialyzed at bedside.  Chlorine/Chloramine water system checked every 4 hours.        Patient repositioned every 2 hours during the treatment.  Post treatment report given to CODY Gonzalez RN regarding 1.5L of fluid removed, last BP of 102/72.

## 2022-12-04 NOTE — PROGRESS NOTES
Brief Vascular Neurology note:     I was notified of MRI C-spine results. Imaging personally reviewed and revealed mild degenerative disease and no evidence of cord signal changes or acute infarcts. Ok to restart heparin gtt.     Clive Zamora MD     Department of Neurology

## 2022-12-04 NOTE — PROGRESS NOTES
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  Likely secondary to acute respiratory failure and COPD exacerbation and parainfluenza and strep pneumo pneumonias.  He had 2 failed extubation and subsequently received a tracheostomy.  He also has acute renal failure and is dialysis dependent.status has waxed and waned.  In the last few days has been much more altered.  Has received some sedating medications but we also have discovered hyperammonemia probably related to worsening of his underlying liver disease.  Definitely more alert today than yesterday and is interactive.  Profoundly weak.  MRI did not show any cervical spine pathology.    Assessment and plan :     Blanco Osborne IS a 57 year old male admitted on 11/12/2022 for acute respiratory failure.   I have personally reviewed the daily labs, imaging studies, cultures and discussed the case with referring physician and consulting physicians.     My assessment and plan by system for this patient is as follows:    Neurology/Psychiatry:   Encephalopathy.  Multifactorial.  No evidence of major stroke on MRI or CT.  Question of critical  illness myopathy and his inability to move extremities now with being alert.  -Continue to hold all sedatives.  -Continue lactulose.  -Up in chair physical therapy    Cardiovascular:   1.  Status post PEA arrest 20 minutes of CPR.  Not a primary cardiac event.  Most likely secondary to respiratory failure. FRANKLIN was normal.  Was in shock which is now resolved.  Lipids and hemoglobin A1c were both within normal limits.      Pulmonary/Ventilator Management:   Acute respiratory failure and ARDS secondary to viral and bacterial pneumonia -improved.  Reintubated twice secondary to same as well as weakness encephalopathy and inability to manage secretions.Also with pneumothorax status post chest tube has been removed.   Tracheostomy was done on 11/29.  Had to be urgently replaced because of malfunctioning cuff.  Exchanged for an XLT.  -Pressure support alternating with trach dome.  Planning for LTAC ongoing.    GI and Nutrition :   1.  Status post liver transplant with evidence of progression of chronic liver disease.  On chronic tacrolimus.  Follows at the [facility].  Have discussed extensively with transplant service who noted that patient had some fibrosis in 2017, so this is likely progression of same.  They say treatment for this process would have resolved working on metabolic syndrome and complete abstinence from alcohol.  Patient was lost to follow-up for 2 years.   2.  Tolerating enteral nutrition   -Awaiting results of liver biopsy to help with prognostication.  -Tacrolimus per transplant service   -Enteral nutrition   -Lactulose       Renal/Fluids/Electrolytes:   1.  Acute renal failure now dialysis dependent.  No indication as of yet return of renal function.  Intermittent dialysis ongoing    Infectious Disease:   1.  Streptococcal pneumonia as well as parainfluenza as well as possible Aspergillus thought less likely by infectious disease consultant.  Also herpes labialis infection.  Completed  antibiotics.  -Acyclovir for 21-day course  -Tacrolimus being dosed by transplant nephrology    Endocrine:   1. Glucoses ok on coverage.  On hydrocortisone.  -Continue hydrocortisone for now.  Tacrolimus not yet therapeutic.  Will taper when is at therapeutic level.    Hematology/Oncology:   1.  Anemia of critical illness and also secondary to kidney disease; .  Thrombocytopenia unclear etiology.  Neurologist had recommended anticoagulation for paroxysmal atrial fibrillation and the potential of embolic strokes.  -Now on therapeutic heparin.       IV/Access:   1. Venous access -   3.  Plan  -Keep in for now while we are adjusting heparin.  Remove once we get to steady state.      ICU Prophylaxis:   1. DVT: Heparin infusion  2. VAP: HOB 30 degrees, chlorhexidine rinse  3. Stress Ulcer: PPI/H2 blocker  4. Restraints: Nonviolent soft two point restraints required and necessary for patient safety and continued cares and good effect as patient continues to pull at necessary lines, tubes despite education and distraction. Will readdress daily.   5. IV Access - central access required and necessary for continued patient cares.  Will discuss daily with nursing  6. Feeding - enteral   7. Family Update: Conference with siblings spouse and close friends as well as parents and parent in law on 12/2.  I discussed more guarded prognosis for patients who have chronic liver disease and to require long-term mechanical ventilation.  The moment consensus is to continue with aggressive care..  Otherwise bedside daily.  8. Disposition - ICU care.  Patient very well may be ready for LTAC midweek.      Key goals for next 24 hours:   1 .Continue off sedation and continue on lactulose   2.  Pressure support trial and trach dome and up in chair if possible  3.  Resume heparin   4.  Disposition planning to resume next week pending patient's clinical condition.         Key Medications:       - MEDICATION INSTRUCTIONS for Dialysis Patients -    Does not apply See Admin Instructions     sodium chloride 0.9%  250 mL Intravenous Once in dialysis/CRRT     sodium chloride 0.9%  300 mL Hemodialysis Machine Once     acyclovir   Topical Q8H     artificial tears  1 drop Both Eyes TID     chlorhexidine  15 mL Mouth/Throat Q12H     folic acid  1 mg Intravenous Daily     heparin lock flush  5-20 mL Intracatheter Q24H     hydrocortisone sodium succinate PF  50 mg Intravenous Q6H     insulin aspart  1-12 Units Subcutaneous Q4H     insulin glargine  10 Units Subcutaneous QAM AC     ipratropium - albuterol 0.5 mg/2.5 mg/3 mL  3 mL Nebulization 4x daily     lactulose  10 g Oral TID     multivitamins w/minerals  15 mL Per Feeding Tube Daily     - MEDICATION INSTRUCTIONS -   Does not apply Once     pantoprazole  40 mg Per Feeding Tube QAM AC    Or     pantoprazole  40 mg Intravenous QAM AC     polyethylene glycol  17 g Oral Daily     senna-docusate  1 tablet Oral BID    Or     senna-docusate  2 tablet Oral BID     sodium chloride (PF)  10-40 mL Intracatheter Q8H     tacrolimus  1 mg Oral or Feeding Tube BID       dextrose       heparin 1,500 Units/hr (12/04/22 0504)     - MEDICATION INSTRUCTIONS -       - MEDICATION INSTRUCTIONS -       norepinephrine 0.03 mcg/kg/min (12/04/22 0512)     sodium chloride 0 mL/hr at 11/22/22 0742               Physical Examination:   Temp:  [97.9  F (36.6  C)-99.6  F (37.6  C)] 97.9  F (36.6  C)  Pulse:  [] 113  Resp:  [14-57] 18  BP: ()/() 121/83  FiO2 (%):  [40 %] 40 %  SpO2:  [83 %-100 %] 98 %           Intake/Output Summary (Last 24 hours) at 12/4/2022 1153  Last data filed at 12/4/2022 0600  Gross per 24 hour   Intake 1524.43 ml   Output 650 ml   Net 874.43 ml         Wt Readings from Last 4 Encounters:   12/04/22 85.4 kg (188 lb 4.4 oz)   10/07/19 137.5 kg (303 lb 3.2 oz)   10/04/19 131.5 kg (290 lb)   02/20/19 140.4 kg (309 lb 9.6 oz)     BP - Mean:  [] 91  Vent Mode: CMV/AC  (Continuous Mandatory Ventilation/  Assist Control)  FiO2 (%): 40 %  Resp Rate (Set): 16 breaths/min  Tidal Volume (Set, mL): 450 mL  PEEP (cm H2O): 5 cmH2O  Pressure Support (cm H2O): 10 cmH2O  Resp: 18    Recent Labs   Lab 11/30/22  0955   O2PER 30       GEN: Tracheostomy eyes open.  No acute distress  HEENT: Pulls reactive, trach ostomy site looks clean, healing scabs from HSV that not changed   PULM: Bandages from previous s clean, comfortable on pressure support and trach dome   CV/COR: RRR S1S2 no gallop,  No rub, no murmur  ABD: soft nontender, nondistended, PEG tube site looks clean  EXT: Anasarca  NEURO: Eyes open, definitely interactive with me.  Unable to move extremities to command  SKIN: no obvious rash; no cyanosis or mottling   LINES: clean, dry intact dialysis catheter         Data:   All data and imaging reviewed     ROUTINE ICU LABS (Last four results)  CMP  Recent Labs   Lab 12/04/22  0952 12/04/22  0941 12/04/22  0353 12/03/22  2356 12/03/22  0751 12/03/22  0513 12/02/22  0851 12/02/22  0421 12/01/22  1549 12/01/22  1303 11/29/22  0737 11/29/22  0439 11/28/22  0508 11/28/22  0504   NA  --  138  --   --   --  137  --  138  --  139   < > 138  --  140   POTASSIUM  --  3.7  --   --   --  3.8  --  4.1  --  3.7   < > 4.4  --  4.3   CHLORIDE  --  102  --   --   --  102  --  103  --  104   < > 105  --  105   CO2  --  22  --   --   --  25  --  23  --  22   < > 24  --  23   ANIONGAP  --  14  --   --   --  10  --  12  --  13   < > 9  --  12   * 234* 172* 207*   < > 210*   < > 219*   < > 220*   < > 149*   < > 245*   BUN  --  107*  --   --   --  75*  --  95*  --  103*   < > 100*  --  83*   CR  --  4.04*  --   --   --  3.14*  --  4.01*  --  4.10*   < > 3.74*  --  2.86*   GFRESTIMATED  --  16*  --   --   --  22*  --  16*  --  16*   < > 18*  --  25*   KELLY  --  7.9*  --   --   --  7.7*  --  8.2*  --  8.1*   < > 7.9*  --  7.9*   MAG  --   --   --   --   --   --   --   --   --   --   --  2.3  --   --    PHOS  --   --   --   --   --   --   --    --   --   --   --  4.2  --  3.7   PROTTOTAL  --   --   --   --   --  5.1*  --   --   --   --   --  4.9*  --  5.1*   ALBUMIN  --   --   --   --   --  1.8*  --   --   --   --   --  1.9*  --  2.0*   BILITOTAL  --   --   --   --   --  1.0  --   --   --   --   --  0.9  --  1.0   ALKPHOS  --   --   --   --   --  240*  --   --   --   --   --  189*  --  250*   AST  --   --   --   --   --  13  --   --   --   --   --  19  --  23   ALT  --   --   --   --   --  25  --   --   --   --   --  24  --  30    < > = values in this interval not displayed.     CBC  Recent Labs   Lab 12/03/22  0513 12/02/22  1352 12/01/22  1303 11/30/22  0807   WBC 20.5* 22.2* 19.7* 10.7   RBC 2.38* 3.06* 2.73* 2.33*   HGB 8.1* 10.4* 9.2* 7.9*   HCT 25.9* 32.8* 29.3* 24.1*   * 107* 107* 103*   MCH 34.0* 34.0* 33.7* 33.9*   MCHC 31.3* 31.7 31.4* 32.8   RDW 18.6* 19.2* 19.6* 19.7*   PLT 74* 113* 117* 66*     INR  Recent Labs   Lab 12/01/22  1303 11/29/22  1402   INR 1.17* 1.26*     Arterial Blood Gas  Recent Labs   Lab 11/30/22  0955   O2PER 30       All cultures:  No results for input(s): CULT in the last 168 hours.      Laura Fernandes MD    Billing: This patient is critically ill: Yes. Total critical care time today 40 minutes excluding time for procedures.

## 2022-12-04 NOTE — PROVIDER NOTIFICATION
Pt hypotensive 60s/30s,  notified, 500ml LR bolus ordered and given, if pt not fluid responsive MAP>65 okay to restart levo

## 2022-12-04 NOTE — PROGRESS NOTES
Renal Medicine Progress Note                                Blanco Osborne MRN# [id number]   Age: 57 year old YOB: 1965   Date of Admission: 11/12/2022 Hospital LOS: 22                  Assessment/Plan:     Blanco Osborne is a 57-year-old male with PMH CARRILLO s/p liver transplant (9/2016) and cirrhosis admitted 11/12/2022 with out of hospital PEA arrest and acute hypoxemic respiratory failure. Course complicated by parainfluenza virus, streptococcal bacteremia, and LORETTA requiring CRRT.      1.  Severe anuric LORETTA:             -CRRT stopped 11/22 and resume on 11/24 and stopped 11/26.    -IHD 11/29/30     2.  Septic shock with MODS:                -no current pressors                  3.  Respiratory failure:    -multiple re intubations    -R Pneumothorax    -Bilateral pleural effusions   -Aspergillus PNA             4.  ESLD due to CARRILLO s/p liver transplant                - tacrolimus on hold               - manage per ICU     5.  Hypoalbuminemia        Dialysis today  Every other day schedule     Interval History:     Trach  PEG    Follows    No UO       ROS:     Intubated and sedated: ROS unable    Medications and Allergies:     Reviewed    Physical Exam:     Vitals were reviewed  Patient Vitals for the past 8 hrs:   BP Temp Temp src Pulse Resp SpO2 Weight   12/04/22 0722 -- -- -- -- -- 96 % --   12/04/22 0645 104/64 -- -- 102 23 98 % --   12/04/22 0630 123/82 -- -- 101 16 97 % --   12/04/22 0615 (!) 110/107 -- -- 110 (!) 41 100 % --   12/04/22 0600 110/73 -- -- 111 28 98 % --   12/04/22 0545 115/74 -- -- (!) 122 16 98 % --   12/04/22 0530 119/79 -- -- 112 26 98 % --   12/04/22 0515 128/80 -- -- 105 19 98 % --   12/04/22 0500 108/73 -- -- 105 17 98 % 85.4 kg (188 lb 4.4 oz)   12/04/22 0445 126/82 -- -- 110 21 (!) 88 % --   12/04/22 0430 124/82 -- -- 105 19 (!) 83 % --   12/04/22 0415 106/67 -- -- 99 18 96 % --   12/04/22 0400 114/69 98  F (36.7  C) Axillary 112 17 91 % --   12/04/22 0345 96/63 -- --  103 (!) 38 96 % --   12/04/22 0330 (!) 82/50 -- -- 100 (!) 34 96 % --   12/04/22 0320 118/73 -- -- 102 -- 95 % --   12/04/22 0315 -- -- -- 110 16 95 % --   12/04/22 0300 97/62 -- -- 106 27 95 % --   12/04/22 0245 107/71 -- -- 110 25 95 % --   12/04/22 0230 106/72 -- -- 108 26 95 % --   12/04/22 0215 122/82 -- -- 107 16 95 % --   12/04/22 0200 109/73 -- -- 105 27 93 % --   12/04/22 0145 120/71 -- -- 106 15 95 % --     I/O last 3 completed shifts:  In: 1774.43 [I.V.:304.43; NG/GT:150]  Out: 650 [Stool:650]    Vitals:    11/30/22 0400 12/01/22 0400 12/02/22 0600 12/03/22 0600   Weight: 88.5 kg (195 lb 1.7 oz) 90.7 kg (199 lb 15.3 oz) 86.1 kg (189 lb 13.1 oz) 85.2 kg (187 lb 13.3 oz)    12/04/22 0500   Weight: 85.4 kg (188 lb 4.4 oz)       GENERAL:  intubated and sedated  HEENT: ETT  RESP:  clear anteriorly  CV: RRR, normal S1 S2  EXT: warm, no edema    Data:     Recent Labs   Lab 12/04/22  0353 12/03/22  2356 12/03/22  1953 12/03/22  1639 12/03/22  0751 12/03/22  0513 12/02/22  0851 12/02/22  0421 12/01/22  1549 12/01/22  1303 11/30/22  1201 11/30/22  0807   NA  --   --   --   --   --  137  --  138  --  139  --  140   POTASSIUM  --   --   --   --   --  3.8  --  4.1  --  3.7  --  4.3   CHLORIDE  --   --   --   --   --  102  --  103  --  104  --  105   CO2  --   --   --   --   --  25  --  23  --  22  --  24   ANIONGAP  --   --   --   --   --  10  --  12  --  13  --  11   * 207* 217* 243*   < > 210*   < > 219*   < > 220*   < > 143*   BUN  --   --   --   --   --  75*  --  95*  --  103*  --  73*   CR  --   --   --   --   --  3.14*  --  4.01*  --  4.10*  --  2.99*   GFRESTIMATED  --   --   --   --   --  22*  --  16*  --  16*  --  23*   KELLY  --   --   --   --   --  7.7*  --  8.2*  --  8.1*  --  8.0*    < > = values in this interval not displayed.         Recent Labs   Lab Test 12/03/22  0513 12/02/22  0421 12/01/22  1303 11/30/22  0807 11/29/22  0439 11/28/22  0504 11/27/22  0447 11/26/22  1121 11/26/22  0403  11/25/22  1947   CR 3.14* 4.01* 4.10* 2.99* 3.74* 2.86* 2.02* 1.29* 1.28* 1.43*     Recent Labs   Lab 12/03/22  0513 11/29/22  0439 11/28/22  0504   ALBUMIN 1.8* 1.9* 2.0*     Recent Labs   Lab 12/03/22  0513 12/02/22  1352 12/01/22  1303 11/30/22  0807 11/29/22  0439 11/28/22  0504   PHOS  --   --   --   --  4.2 3.7   HGB 8.1* 10.4* 9.2* 7.9* 7.4* 8.3*         G Jose Reilly MD    [facility] - Nephrology  [phone]

## 2022-12-04 NOTE — PROGRESS NOTES
End of Shift Summary:    Neuro- Opens eyes spontaneously, tracks, nods to some questions, @0600 pt was able to wiggle toes when asked.  Grimaces to pain, withdraw in LLE. PERRL     Resp- Pt on CMV/AC over night 16/450/40%/5, some episodes of desaturations to 87% lavaged and suction, resolved shortly after. Copious inline secretions through trach- red tinged/yellow, very thick. Oozing around tach     Cardiac- Pt tachycardic 110s  sinus dysrhythmia, afebrile. Pt became hypotensive @ ~2030,  notified given 500 LR bolus, pt not fluid responsive, levo restarted, pt on 0.03 of levo for MAP>65     GI-  BGs elevated given sliding scale insulin for coverage, TF @ 50ml/hr with q4 30ml FWF, rectal tube in place     - aneuric      Integ/Msk- see wound/skin assessments      Lines/Gtts- 3 lumen PICC, Heparin restarted after MRI (per neurology), increased to 1500U after subtheraputic Ant-Xa     Labs- next Anti XA @ 0845      [facility]

## 2022-12-04 NOTE — PROGRESS NOTES
[facility] ICU RESPIRATORY NOTE        Date of Admission: 11/12/2022    Date of Intubation (most recent): 11/26/2022    Reason for Mechanical Ventilation: Respiratory failure    Number of Days on Mechanical Ventilation: 8    Met Criteria for Spontaneous Breathing Trial: YES - Trach dome for hours    Reason for No Spontaneous Breathing Trial: N/A  Significant Events Today: Trach dome trials    ABG Results:   Recent Labs   Lab 11/30/22  0955   O2PER 30       Current Vent Settings: Vent Mode: CMV/AC  (Continuous Mandatory Ventilation/ Assist Control)  FiO2 (%): 40 %  Resp Rate (Set): 16 breaths/min  Tidal Volume (Set, mL): 450 mL  PEEP (cm H2O): 5 cmH2O  Pressure Support (cm H2O): 10 cmH2O  Resp: 26      Skin Assessment: Good and intact    Plan: Continue trach dome try in the [facility]    RT Nikolai on 12/4/2022 at 12:36 AM

## 2022-12-04 NOTE — PROGRESS NOTES
"Brief NCC Note    MRI C-spine reviewed, no pathology. See yesterday's progress note for full details re: workup/impressions of encephalopathy. Suspect altered mental status + weakness right now are combination of toxic/metabolic/infectious pathology +/- critical illness myopathy.     No further NCC workup at this time, please call with questions/for further assistance. We will sign off.    Karuna Joshi MD  Vascular Neurology  To page me or covering stroke neurology team member, click here: AMCOM   Choose \"On Call\" tab at top, then search dropdown box for \"Neurology Adult\", select location, press Enter, then look for stroke/neuro ICU/telestroke.    "

## 2022-12-04 NOTE — PROGRESS NOTES
I ASSISTED TRANSPORTING PATIENT FROM  MRI TO ICU  WITH NO COMPLICATION ON [facility] CMV/16/450/40%/P5.

## 2022-12-05 ENCOUNTER — APPOINTMENT (OUTPATIENT)
Dept: GENERAL RADIOLOGY | Facility: CLINIC | Age: 57
DRG: 004 | End: 2022-12-05
Attending: INTERNAL MEDICINE
Payer: COMMERCIAL

## 2022-12-05 LAB
BASE EXCESS BLDV CALC-SCNC: 0 MMOL/L (ref -7.7–1.9)
ERYTHROCYTE [DISTWIDTH] IN BLOOD BY AUTOMATED COUNT: 17.8 % (ref 10–15)
GLUCOSE BLDC GLUCOMTR-MCNC: 166 MG/DL (ref 70–99)
GLUCOSE BLDC GLUCOMTR-MCNC: 170 MG/DL (ref 70–99)
GLUCOSE BLDC GLUCOMTR-MCNC: 178 MG/DL (ref 70–99)
GLUCOSE BLDC GLUCOMTR-MCNC: 184 MG/DL (ref 70–99)
GLUCOSE BLDC GLUCOMTR-MCNC: 212 MG/DL (ref 70–99)
GLUCOSE BLDC GLUCOMTR-MCNC: 224 MG/DL (ref 70–99)
GLUCOSE BLDC GLUCOMTR-MCNC: 282 MG/DL (ref 70–99)
HCO3 BLDV-SCNC: 27 MMOL/L (ref 21–28)
HCT VFR BLD AUTO: 25.9 % (ref 40–53)
HGB BLD-MCNC: 7.9 G/DL (ref 13.3–17.7)
MCH RBC QN AUTO: 34.2 PG (ref 26.5–33)
MCHC RBC AUTO-ENTMCNC: 30.5 G/DL (ref 31.5–36.5)
MCV RBC AUTO: 112 FL (ref 78–100)
O2/TOTAL GAS SETTING VFR VENT: 40 %
PATH REPORT.COMMENTS IMP SPEC: NORMAL
PATH REPORT.FINAL DX SPEC: NORMAL
PATH REPORT.GROSS SPEC: NORMAL
PATH REPORT.MICROSCOPIC SPEC OTHER STN: NORMAL
PATH REPORT.RELEVANT HX SPEC: NORMAL
PCO2 BLDV: 54 MM HG (ref 40–50)
PH BLDV: 7.3 [PH] (ref 7.32–7.43)
PHOSPHATE SERPL-MCNC: 3.7 MG/DL (ref 2.5–4.5)
PHOTO IMAGE: NORMAL
PLATELET # BLD AUTO: 83 10E3/UL (ref 150–450)
PO2 BLDV: 38 MM HG (ref 25–47)
RBC # BLD AUTO: 2.31 10E6/UL (ref 4.4–5.9)
TACROLIMUS BLD-MCNC: 3.3 UG/L (ref 5–15)
TME LAST DOSE: ABNORMAL H
TME LAST DOSE: ABNORMAL H
UFH PPP CHRO-ACNC: 0.27 IU/ML
UFH PPP CHRO-ACNC: 0.32 IU/ML
WBC # BLD AUTO: 11.9 10E3/UL (ref 4–11)

## 2022-12-05 PROCEDURE — 250N000009 HC RX 250: Performed by: INTERNAL MEDICINE

## 2022-12-05 PROCEDURE — 94640 AIRWAY INHALATION TREATMENT: CPT

## 2022-12-05 PROCEDURE — 85027 COMPLETE CBC AUTOMATED: CPT | Performed by: INTERNAL MEDICINE

## 2022-12-05 PROCEDURE — 84100 ASSAY OF PHOSPHORUS: CPT | Performed by: PHYSICIAN ASSISTANT

## 2022-12-05 PROCEDURE — 99233 SBSQ HOSP IP/OBS HIGH 50: CPT | Performed by: INTERNAL MEDICINE

## 2022-12-05 PROCEDURE — 94003 VENT MGMT INPAT SUBQ DAY: CPT

## 2022-12-05 PROCEDURE — 250N000013 HC RX MED GY IP 250 OP 250 PS 637: Performed by: PHYSICIAN ASSISTANT

## 2022-12-05 PROCEDURE — 250N000012 HC RX MED GY IP 250 OP 636 PS 637: Performed by: NURSE PRACTITIONER

## 2022-12-05 PROCEDURE — 94668 MNPJ CHEST WALL SBSQ: CPT

## 2022-12-05 PROCEDURE — 93005 ELECTROCARDIOGRAM TRACING: CPT

## 2022-12-05 PROCEDURE — 250N000011 HC RX IP 250 OP 636: Performed by: INTERNAL MEDICINE

## 2022-12-05 PROCEDURE — 250N000013 HC RX MED GY IP 250 OP 250 PS 637: Performed by: STUDENT IN AN ORGANIZED HEALTH CARE EDUCATION/TRAINING PROGRAM

## 2022-12-05 PROCEDURE — 258N000003 HC RX IP 258 OP 636: Performed by: SURGERY

## 2022-12-05 PROCEDURE — 85520 HEPARIN ASSAY: CPT | Performed by: INTERNAL MEDICINE

## 2022-12-05 PROCEDURE — 88307 TISSUE EXAM BY PATHOLOGIST: CPT | Mod: 26 | Performed by: PATHOLOGY

## 2022-12-05 PROCEDURE — 71045 X-RAY EXAM CHEST 1 VIEW: CPT

## 2022-12-05 PROCEDURE — 94640 AIRWAY INHALATION TREATMENT: CPT | Mod: 76

## 2022-12-05 PROCEDURE — 88313 SPECIAL STAINS GROUP 2: CPT | Mod: 26 | Performed by: PATHOLOGY

## 2022-12-05 PROCEDURE — 250N000009 HC RX 250

## 2022-12-05 PROCEDURE — 80197 ASSAY OF TACROLIMUS: CPT | Performed by: NURSE PRACTITIONER

## 2022-12-05 PROCEDURE — 99291 CRITICAL CARE FIRST HOUR: CPT | Performed by: INTERNAL MEDICINE

## 2022-12-05 PROCEDURE — 250N000009 HC RX 250: Performed by: PHYSICIAN ASSISTANT

## 2022-12-05 PROCEDURE — 82803 BLOOD GASES ANY COMBINATION: CPT | Performed by: INTERNAL MEDICINE

## 2022-12-05 PROCEDURE — 200N000001 HC R&B ICU

## 2022-12-05 PROCEDURE — 999N000157 HC STATISTIC RCP TIME EA 10 MIN

## 2022-12-05 PROCEDURE — 250N000011 HC RX IP 250 OP 636: Performed by: PHYSICIAN ASSISTANT

## 2022-12-05 PROCEDURE — 250N000012 HC RX MED GY IP 250 OP 636 PS 637: Performed by: PHYSICIAN ASSISTANT

## 2022-12-05 PROCEDURE — 93010 ELECTROCARDIOGRAM REPORT: CPT | Performed by: INTERNAL MEDICINE

## 2022-12-05 PROCEDURE — 250N000009 HC RX 250: Performed by: NURSE PRACTITIONER

## 2022-12-05 RX ORDER — LACTULOSE 10 G/15ML
10 SOLUTION ORAL 3 TIMES DAILY
Status: DISCONTINUED | OUTPATIENT
Start: 2022-12-05 | End: 2022-12-09

## 2022-12-05 RX ORDER — NOREPINEPHRINE BITARTRATE 0.02 MG/ML
.01-.6 INJECTION, SOLUTION INTRAVENOUS CONTINUOUS
Status: DISCONTINUED | OUTPATIENT
Start: 2022-12-06 | End: 2022-12-15

## 2022-12-05 RX ORDER — POLYETHYLENE GLYCOL 3350 17 G/17G
17 POWDER, FOR SOLUTION ORAL DAILY
Status: DISCONTINUED | OUTPATIENT
Start: 2022-12-06 | End: 2022-12-08

## 2022-12-05 RX ORDER — NOREPINEPHRINE BITARTRATE 0.02 MG/ML
INJECTION, SOLUTION INTRAVENOUS
Status: COMPLETED
Start: 2022-12-05 | End: 2022-12-05

## 2022-12-05 RX ORDER — ACETYLCYSTEINE 200 MG/ML
2 SOLUTION ORAL; RESPIRATORY (INHALATION) 4 TIMES DAILY
Status: DISCONTINUED | OUTPATIENT
Start: 2022-12-05 | End: 2022-12-11

## 2022-12-05 RX ORDER — AMOXICILLIN 250 MG
2 CAPSULE ORAL 2 TIMES DAILY
Status: DISCONTINUED | OUTPATIENT
Start: 2022-12-05 | End: 2022-12-08

## 2022-12-05 RX ORDER — AMOXICILLIN 250 MG
1 CAPSULE ORAL 2 TIMES DAILY
Status: DISCONTINUED | OUTPATIENT
Start: 2022-12-05 | End: 2022-12-08

## 2022-12-05 RX ADMIN — ACYCLOVIR: 50 OINTMENT TOPICAL at 21:31

## 2022-12-05 RX ADMIN — INSULIN ASPART 2 UNITS: 100 INJECTION, SOLUTION INTRAVENOUS; SUBCUTANEOUS at 07:40

## 2022-12-05 RX ADMIN — HYDROCORTISONE SODIUM SUCCINATE 50 MG: 100 INJECTION, POWDER, FOR SOLUTION INTRAMUSCULAR; INTRAVENOUS at 07:41

## 2022-12-05 RX ADMIN — ACYCLOVIR: 50 OINTMENT TOPICAL at 05:45

## 2022-12-05 RX ADMIN — IPRATROPIUM BROMIDE AND ALBUTEROL SULFATE 3 ML: .5; 3 SOLUTION RESPIRATORY (INHALATION) at 07:27

## 2022-12-05 RX ADMIN — LACTULOSE 10 G: 20 SOLUTION ORAL at 21:31

## 2022-12-05 RX ADMIN — Medication 0.03 MCG/KG/MIN: at 23:54

## 2022-12-05 RX ADMIN — INSULIN ASPART 6 UNITS: 100 INJECTION, SOLUTION INTRAVENOUS; SUBCUTANEOUS at 11:17

## 2022-12-05 RX ADMIN — HEPARIN SODIUM 1650 UNITS/HR: 10000 INJECTION, SOLUTION INTRAVENOUS at 08:20

## 2022-12-05 RX ADMIN — HYDROCORTISONE SODIUM SUCCINATE 25 MG: 100 INJECTION, POWDER, FOR SOLUTION INTRAMUSCULAR; INTRAVENOUS at 20:10

## 2022-12-05 RX ADMIN — INSULIN ASPART 4 UNITS: 100 INJECTION, SOLUTION INTRAVENOUS; SUBCUTANEOUS at 00:04

## 2022-12-05 RX ADMIN — IPRATROPIUM BROMIDE AND ALBUTEROL SULFATE 3 ML: .5; 3 SOLUTION RESPIRATORY (INHALATION) at 19:37

## 2022-12-05 RX ADMIN — TACROLIMUS 1 MG: 5 CAPSULE ORAL at 08:13

## 2022-12-05 RX ADMIN — TACROLIMUS 1 MG: 5 CAPSULE ORAL at 20:17

## 2022-12-05 RX ADMIN — HYDROCORTISONE SODIUM SUCCINATE 25 MG: 100 INJECTION, POWDER, FOR SOLUTION INTRAMUSCULAR; INTRAVENOUS at 14:01

## 2022-12-05 RX ADMIN — FOLIC ACID 1 MG: 5 INJECTION, SOLUTION INTRAMUSCULAR; INTRAVENOUS; SUBCUTANEOUS at 07:41

## 2022-12-05 RX ADMIN — INSULIN ASPART 2 UNITS: 100 INJECTION, SOLUTION INTRAVENOUS; SUBCUTANEOUS at 15:51

## 2022-12-05 RX ADMIN — Medication 15 ML: at 07:40

## 2022-12-05 RX ADMIN — INSULIN ASPART 3 UNITS: 100 INJECTION, SOLUTION INTRAVENOUS; SUBCUTANEOUS at 04:49

## 2022-12-05 RX ADMIN — CHLORHEXIDINE GLUCONATE 0.12% ORAL RINSE 15 ML: 1.2 LIQUID ORAL at 21:30

## 2022-12-05 RX ADMIN — Medication 40 MG: at 07:41

## 2022-12-05 RX ADMIN — INSULIN GLARGINE 10 UNITS: 100 INJECTION, SOLUTION SUBCUTANEOUS at 07:40

## 2022-12-05 RX ADMIN — LACTULOSE 10 G: 20 SOLUTION ORAL at 07:39

## 2022-12-05 RX ADMIN — ACYCLOVIR: 50 OINTMENT TOPICAL at 13:51

## 2022-12-05 RX ADMIN — LACTULOSE 10 G: 20 SOLUTION ORAL at 15:43

## 2022-12-05 RX ADMIN — CHLORHEXIDINE GLUCONATE 0.12% ORAL RINSE 15 ML: 1.2 LIQUID ORAL at 07:41

## 2022-12-05 RX ADMIN — SODIUM CHLORIDE 500 ML: 9 INJECTION, SOLUTION INTRAVENOUS at 02:32

## 2022-12-05 RX ADMIN — Medication 1 DROP: at 07:40

## 2022-12-05 RX ADMIN — Medication 0.03 MCG/KG/MIN: at 00:38

## 2022-12-05 RX ADMIN — IPRATROPIUM BROMIDE AND ALBUTEROL SULFATE 3 ML: .5; 3 SOLUTION RESPIRATORY (INHALATION) at 12:30

## 2022-12-05 RX ADMIN — INSULIN ASPART 2 UNITS: 100 INJECTION, SOLUTION INTRAVENOUS; SUBCUTANEOUS at 20:21

## 2022-12-05 RX ADMIN — Medication 1 DROP: at 21:31

## 2022-12-05 RX ADMIN — ACETYLCYSTEINE 2 ML: 200 SOLUTION ORAL; RESPIRATORY (INHALATION) at 19:37

## 2022-12-05 RX ADMIN — HYDROCORTISONE SODIUM SUCCINATE 50 MG: 100 INJECTION, POWDER, FOR SOLUTION INTRAMUSCULAR; INTRAVENOUS at 02:33

## 2022-12-05 RX ADMIN — Medication 1 DROP: at 15:46

## 2022-12-05 ASSESSMENT — ACTIVITIES OF DAILY LIVING (ADL)
ADLS_ACUITY_SCORE: 36
ADLS_ACUITY_SCORE: 49
ADLS_ACUITY_SCORE: 36
TOILETING_ISSUES: NO
WALKING_OR_CLIMBING_STAIRS_DIFFICULTY: NO
ADLS_ACUITY_SCORE: 36
FALL_HISTORY_WITHIN_LAST_SIX_MONTHS: NO
ADLS_ACUITY_SCORE: 36
WEAR_GLASSES_OR_BLIND: NO
DOING_ERRANDS_INDEPENDENTLY_DIFFICULTY: NO
ADLS_ACUITY_SCORE: 36
DIFFICULTY_EATING/SWALLOWING: OTHER (SEE COMMENTS)
CHANGE_IN_FUNCTIONAL_STATUS_SINCE_ONSET_OF_CURRENT_ILLNESS/INJURY: YES
ADLS_ACUITY_SCORE: 36
CONCENTRATING,_REMEMBERING_OR_MAKING_DECISIONS_DIFFICULTY: NO
ADLS_ACUITY_SCORE: 36
ADLS_ACUITY_SCORE: 49
DRESSING/BATHING_DIFFICULTY: NO

## 2022-12-05 NOTE — PROGRESS NOTES
CLINICAL NUTRITION SERVICES - REASSESSMENT NOTE      Malnutrition: (11/28)  % Weight Loss:  Weight loss does not meet criteria for malnutrition - intentional weight loss over past >1 year, per RD note 11/14  % Intake:  Decreased intake does not meet criteria for malnutrition - various interruptions to TF  Subcutaneous Fat Loss:  Orbital region mild depletion  Muscle Loss:  Temporal region mild depletion  Fluid Retention:  Trace to mild - generalized, bilateral wrist and hand, bilateral foot and ankle edema      Malnutrition Diagnosis: Patient does not meet two of the established criteria necessary for diagnosing malnutrition but is at risk       EVALUATION OF PROGRESS TOWARD GOALS   Diet:  NPO with Trach     Nutrition Support:  Patient continues on goal TF regimen as follows ~    Nutrition Support Enteral:  Type of Feeding Tube: 14 Georgian G-tube   Enteral Frequency:  Continuous  Enteral Regimen: Novasource Renal at 55 mL/hr  Total Enteral Provisions: 2640 kcal (29 kcal/kg), 120 g protein (1.3 g/kg), 242 g CHO, 0 g fiber, 1319 mL H2O  Free Water Flush: 30 mL every 4 hours     Intake/Tolerance:    Phos normal  -246 with High sliding scale insulin   I/O 2527/1705, wt 85.8 kg (down 4.9 kg from dosing wt)  Stool 200 mL (continues on Lactulose, holding bowel meds)      ASSESSED NUTRITION NEEDS:  Dosing Weight::  90.7 kg (12/1)  Estimated Energy Needs: 4806-4478 kcals (25-30 Kcal/Kg)  Justification: maintenance    Estimated Protein Needs: 110-135 grams protein (1.2-1.5 g pro/Kg)  Justification: hypercatabolism with critical illness and preservation of lean body mass      NEW FINDINGS:   Workup underway for LTACH placement     Norepi drip off     Receiving MVI, Folic Acid supplementation     Previous Goals (12/1):   TF will continue to meet % needs   Evaluation: Met    Previous Nutrition Diagnosis (12/1):   No nutrition diagnosis identified at this time   Evaluation: No change      CURRENT NUTRITION  DIAGNOSIS  No nutrition diagnosis identified at this time     INTERVENTIONS  Recommendations / Nutrition Prescription  Continue goal TF regimen as above     Implementation  Collaboration and Referral of Nutrition care:  Patient discussed today during interdisciplinary bedside rounds     Goals  TF regimen continues to meet % needs     MONITORING AND EVALUATION:  Progress towards goals will be monitored and evaluated per protocol and Practice Guidelines    Martha Sandoval RD, LD, CNSC   Clinical Dietitian - [facility]

## 2022-12-05 NOTE — PROGRESS NOTES
Renal Medicine Progress Note            Assessment/Plan:     Blanco Osborne is a 57-year-old male with PMH CARRILLO s/p liver transplant (9/2016) and cirrhosis admitted 11/12/2022 with out of hospital PEA arrest and acute hypoxemic respiratory failure. Course complicated by parainfluenza virus, streptococcal bacteremia, and LORETTA requiring CRRT.        1.  Severe anuric LORETTA:               -CRRT stopped 11/22 and resume on 11/24 and stopped 11/26.               -IHD started 11/29/30. Last session 12/4/22.      2.  Septic shock with MODS:                -no current pressors                  3.  Respiratory failure: current 30%  FIo2              -multiple re intubations               -R Pneumothorax               -Bilateral pleural effusions              -Aspergillus PNA             4.  ESLD due to CARRILLO s/p liver transplant                - tacrolimus 1mg BID               - manage per ICU      5.  Hypoalbuminemia         Plan:  1) Plan HD tomorrow,  patient today clearly shook his head no to me informing him of this plan. Need to clarify mentation and ability to make decisions. He appeared to understand dialysis is felt to be temporary and at this stage, life preserving.  2) if continuing dialysis, will need placement of tunneled catheter prior to potential LTACH placement    Torsten Montaño DO  [facility] consultants  Office: [phone]  Cell: [phone]        Interval History:      Meeting patient for first time, trached but awake, alert and able to shake head yes and no to questions. No major events overnight. S/p dialysis yesterday, ran 3.5 hrs on 3K bath with 1.5L ultrafiltration done.            Medications and Allergies:       - MEDICATION INSTRUCTIONS for Dialysis Patients -   Does not apply See Admin Instructions     acyclovir   Topical Q8H     artificial tears  1 drop Both Eyes TID     chlorhexidine  15 mL Mouth/Throat Q12H     folic acid  1 mg Intravenous Daily     heparin lock flush  5-20 mL Intracatheter  Q24H     hydrocortisone sodium succinate PF  50 mg Intravenous Q6H     insulin aspart  1-12 Units Subcutaneous Q4H     insulin glargine  10 Units Subcutaneous QAM AC     ipratropium - albuterol 0.5 mg/2.5 mg/3 mL  3 mL Nebulization 4x daily     lactulose  10 g Oral or Feeding Tube TID     multivitamins w/minerals  15 mL Per Feeding Tube Daily     pantoprazole  40 mg Per Feeding Tube QAM AC    Or     pantoprazole  40 mg Intravenous QAM AC     [START ON 12/6/2022] polyethylene glycol  17 g Oral or Feeding Tube Daily     senna-docusate  1 tablet Oral or Feeding Tube BID    Or     senna-docusate  2 tablet Oral or Feeding Tube BID     sodium chloride (PF)  10-40 mL Intracatheter Q8H     tacrolimus  1 mg Oral or Feeding Tube BID      No Known Allergies         Physical Exam:   Vitals were reviewed  /71   Pulse 105   Temp 98.6  F (37  C) (Axillary)   Resp (!) 32   Ht 1.829 m (6')   Wt 85.8 kg (189 lb 2.5 oz)   SpO2 94%   BMI 25.65 kg/m      Wt Readings from Last 3 Encounters:   12/05/22 85.8 kg (189 lb 2.5 oz)   10/07/19 137.5 kg (303 lb 3.2 oz)   10/04/19 131.5 kg (290 lb)       Intake/Output Summary (Last 24 hours) at 12/5/2022 1101  Last data filed at 12/5/2022 1000  Gross per 24 hour   Intake 2493.63 ml   Output 1755 ml   Net 738.63 ml       GENERAL APPEARANCE: frail, awake, nods head to questions  HEENT:  Trach rpesent  RESP: lungs coarse b/l  CV: RRR, nl S1/S2   ABDOMEN: o/s/nt/nd, bs present  EXTREMITIES/SKIN: no c/c/rashes/lesions; no edema             Data:     BMP  Recent Labs   Lab 12/05/22  0736 12/05/22  0440 12/05/22  0002 12/04/22  2001 12/04/22  0952 12/04/22  0941 12/03/22  0751 12/03/22  0513 12/02/22  0851 12/02/22  0421 12/01/22  1549 12/01/22  1303   NA  --   --   --   --   --  138  --  137  --  138  --  139   POTASSIUM  --   --   --   --   --  3.7  --  3.8  --  4.1  --  3.7   CHLORIDE  --   --   --   --   --  102  --  102  --  103  --  104   KELLY  --   --   --   --   --  7.9*  --  7.7*  --   8.2*  --  8.1*   CO2  --   --   --   --   --  22  --  25  --  23  --  22   BUN  --   --   --   --   --  107*  --  75*  --  95*  --  103*   CR  --   --   --   --   --  4.04*  --  3.14*  --  4.01*  --  4.10*   * 212* 224* 176*   < > 234*   < > 210*   < > 219*   < > 220*    < > = values in this interval not displayed.     CBC  Recent Labs   Lab 12/05/22  0440 12/03/22  0513 12/02/22  1352 12/01/22  1303   WBC 11.9* 20.5* 22.2* 19.7*   HGB 7.9* 8.1* 10.4* 9.2*   HCT 25.9* 25.9* 32.8* 29.3*   * 109* 107* 107*   PLT 83* 74* 113* 117*     Lab Results   Component Value Date    AST 13 12/03/2022    ALT 25 12/03/2022    ALKPHOS 240 (H) 12/03/2022    BILITOTAL 1.0 12/03/2022    CHANDLER 76 (H) 11/30/2022     Lab Results   Component Value Date    INR 1.17 (H) 12/01/2022       Attestation:  I have reviewed today's vital signs, notes, medications, labs and imaging.    DO [facility] - Nephrology  Office: [phone]  Cell: [phone]

## 2022-12-05 NOTE — PROGRESS NOTES
"Critical Care Progress Note      12/05/2022    Name: Blanco Osborne MRN#: [id number]   Age: 57 year old YOB: 1965     Hsptl Day# 23  ICU DAY #    MV DAY #             Problem List:   Principal Problem:    Respiratory acidosis  Active Problems:    Parainfluenza type 1 infection    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)    Acquired immunocompromised state (H)    Sepsis due to Streptococcus pneumoniae with acute hypoxic respiratory failure (H)    Encounter for therapeutic drug monitoring    Aspergillus pneumonia (H)    Cirrhosis of liver (H)    Other ascites    Respiratory arrest (H)    Lactic acidosis    Acute renal failure, unspecified acute renal failure type (H)    Embolic stroke (H)    Hyperammonemia (H)    Pneumothorax    Critical illness myopathy      1. Acute respiratory failure - he is down to 30% and +5 PEEP, trach now in place and tolerating off vent during the day. He has diminished breath sounds on left and will \"step up\" pulmonary hygiene measures and check CXR in AM. He may also have a component of   2. Pneumonia - strep and parainfluenza; He has completed antibiotics and is currently off them; monitor closely.   3. ID- voriconazole off now; completed 15 days of Rocephin  4. Shock - he is off vasopressors   4. Acute renal failure -  HD now    5. S/p initial arrest and prolonged resuscitation (11.9) and hypothermia resuscitation   6. History of Liver Transplant 2016 - tacrolimus at 1 mg bid and level 3.3; will check with liver Tx service regarding increasing dose. He remains on solucortef and dose decreased today from 50 q6h to 25 q6h. Liver Bx is pending and there is concern that he may have cirrhosis.   7. History of HTN  8. CHRISTIAN on BIPAP when not on vent   9. Nutrition - enteral resumed via PEG   10. CNS- awake and following commands, long term CNS prognosis is guarded.  11. Atrial fibrillation, likely paroxysmal and with concern of prior CVA's will continue with apixaban at 5 BID and " stop IV Heprain             Summary/Hospital Course:           Assessment and plan :     Blanco Osborne IS a 57 year old male admitted on 11/12/2022 for acute respiratory failure .   I have personally reviewed the daily labs, imaging studies, cultures and discussed the case with referring physician and consulting physicians.     My assessment and plan by system for this patient is as follows:    Neurology/Psychiatry:   1. Seems to be alert and following commands     Cardiovascular:   1.Hemodynamics - compensated off support.     Pulmonary/Ventilator Management:   1. As above on 30-40% FIO2 and +5 PEEP and slowly weaning from vent     GI and Nutrition :   1. Enteral nutrition     Renal/Fluids/Electrolytes:   1. He remains on HD at present;     Infectious Disease:   1. Off antibiotics    Endocrine:   1. Compensated; lantus increased today     Hematology/Oncology:   1. Unchanged      IV/Access:   1. Venous access -   2. Arterial access -   3.  Plan  - central access required and necessary      ICU Prophylaxis:   1. DVT: Hep Subq/ LMWH/mechanical  2. VAP: HOB 30 degrees, chlorhexidine rinse  3. Stress Ulcer: PPI/H2 blocker  4. Restraints: Nonviolent soft two point restraints required and necessary for patient safety and continued cares and good effect as patient continues to pull at necessary lines, tubes despite education and distraction. Will readdress daily.   5. IV Access - central access required and necessary for continued patient cares  6. Feeding - enteral   7. Family Update: discussed with wife and patient at bedside  8. Disposition -ICU care; LTAC when bed available.       Key goals for next 24 hours:   1. Continue current regimen   2. Pulmonary hygiene   3. Transition IV heparin to PO apixaban                             Interim History:              Key Medications:       - MEDICATION INSTRUCTIONS for Dialysis Patients -   Does not apply See Admin Instructions     acetylcysteine  2 mL Nebulization 4x Daily      acyclovir   Topical Q8H     artificial tears  1 drop Both Eyes TID     chlorhexidine  15 mL Mouth/Throat Q12H     folic acid  1 mg Intravenous Daily     heparin lock flush  5-20 mL Intracatheter Q24H     hydrocortisone sodium succinate PF  25 mg Intravenous Q6H     insulin aspart  1-12 Units Subcutaneous Q4H     insulin glargine  10 Units Subcutaneous QAM AC     ipratropium - albuterol 0.5 mg/2.5 mg/3 mL  3 mL Nebulization 4x daily     lactulose  10 g Oral or Feeding Tube TID     multivitamins w/minerals  15 mL Per Feeding Tube Daily     pantoprazole  40 mg Per Feeding Tube QAM AC    Or     pantoprazole  40 mg Intravenous QAM AC     [START ON 12/6/2022] polyethylene glycol  17 g Oral or Feeding Tube Daily     senna-docusate  1 tablet Oral or Feeding Tube BID    Or     senna-docusate  2 tablet Oral or Feeding Tube BID     sodium chloride (PF)  10-40 mL Intracatheter Q8H     tacrolimus  1 mg Oral or Feeding Tube BID       dextrose       heparin 1,650 Units/hr (12/05/22 1232)     - MEDICATION INSTRUCTIONS -       - MEDICATION INSTRUCTIONS -       norepinephrine Stopped (12/05/22 0648)     sodium chloride 0 mL/hr at 11/22/22 0742               Physical Examination:   Temp:  [97.5  F (36.4  C)-99.1  F (37.3  C)] 98.9  F (37.2  C)  Pulse:  [] 115  Resp:  [10-53] 28  BP: ()/(44-96) 147/92  FiO2 (%):  [30 %-40 %] (P) 30 %  SpO2:  [90 %-100 %] 94 %    Intake/Output Summary (Last 24 hours) at 12/5/2022 1334  Last data filed at 12/5/2022 1200  Gross per 24 hour   Intake 2486.63 ml   Output 1805 ml   Net 681.63 ml     Wt Readings from Last 4 Encounters:   12/05/22 85.8 kg (189 lb 2.5 oz)   10/07/19 137.5 kg (303 lb 3.2 oz)   10/04/19 131.5 kg (290 lb)   02/20/19 140.4 kg (309 lb 9.6 oz)     BP - Mean:  [] 111  Vent Mode: CMV/AC  (Continuous Mandatory Ventilation/ Assist Control)  FiO2 (%): 30 %  Resp Rate (Set): 16 breaths/min  Tidal Volume (Set, mL): 450 mL  PEEP (cm H2O): 5 cmH2O  Pressure Support (cm  H2O): 10 cmH2O  Resp: 28    Recent Labs   Lab 11/30/22  0955   O2PER 30       GEN: no acute distress   HEENT: head ncat, sclera anicteric, OP patent, trachea midline   PULM: unlabored synchronous with vent, clear anteriorly    CV/COR: RRR S1S2 no gallop,  No rub, no murmur  ABD: soft nontender, hypoactive bowel sounds, no mass  EXT:  Mild edema   warm  NEURO: grossly intact  SKIN: no obvious rash  LINES: clean, dry intact         Data:   All data and imaging reviewed     ROUTINE ICU LABS (Last four results)  CMP  Recent Labs   Lab 12/05/22  1116 12/05/22  0736 12/05/22  0440 12/05/22  0002 12/04/22  0952 12/04/22  0941 12/03/22  0751 12/03/22  0513 12/02/22  0851 12/02/22  0421 12/01/22  1549 12/01/22  1303 11/29/22  0737 11/29/22  0439   NA  --   --   --   --   --  138  --  137  --  138  --  139   < > 138   POTASSIUM  --   --   --   --   --  3.7  --  3.8  --  4.1  --  3.7   < > 4.4   CHLORIDE  --   --   --   --   --  102  --  102  --  103  --  104   < > 105   CO2  --   --   --   --   --  22  --  25  --  23  --  22   < > 24   ANIONGAP  --   --   --   --   --  14  --  10  --  12  --  13   < > 9   * 184* 212* 224*   < > 234*   < > 210*   < > 219*   < > 220*   < > 149*   BUN  --   --   --   --   --  107*  --  75*  --  95*  --  103*   < > 100*   CR  --   --   --   --   --  4.04*  --  3.14*  --  4.01*  --  4.10*   < > 3.74*   GFRESTIMATED  --   --   --   --   --  16*  --  22*  --  16*  --  16*   < > 18*   KELLY  --   --   --   --   --  7.9*  --  7.7*  --  8.2*  --  8.1*   < > 7.9*   MAG  --   --   --   --   --   --   --   --   --   --   --   --   --  2.3   PHOS  --   --  3.7  --   --   --   --   --   --   --   --   --   --  4.2   PROTTOTAL  --   --   --   --   --   --   --  5.1*  --   --   --   --   --  4.9*   ALBUMIN  --   --   --   --   --   --   --  1.8*  --   --   --   --   --  1.9*   BILITOTAL  --   --   --   --   --   --   --  1.0  --   --   --   --   --  0.9   ALKPHOS  --   --   --   --   --   --   --  240*   --   --   --   --   --  189*   AST  --   --   --   --   --   --   --  13  --   --   --   --   --  19   ALT  --   --   --   --   --   --   --  25  --   --   --   --   --  24    < > = values in this interval not displayed.     CBC  Recent Labs   Lab 12/05/22  0440 12/03/22  0513 12/02/22  1352 12/01/22  1303   WBC 11.9* 20.5* 22.2* 19.7*   RBC 2.31* 2.38* 3.06* 2.73*   HGB 7.9* 8.1* 10.4* 9.2*   HCT 25.9* 25.9* 32.8* 29.3*   * 109* 107* 107*   MCH 34.2* 34.0* 34.0* 33.7*   MCHC 30.5* 31.3* 31.7 31.4*   RDW 17.8* 18.6* 19.2* 19.6*   PLT 83* 74* 113* 117*     INR  Recent Labs   Lab 12/01/22  1303 11/29/22  1402   INR 1.17* 1.26*     Arterial Blood Gas  Recent Labs   Lab 11/30/22  0955   O2PER 30       All cultures:  No results for input(s): CULT in the last 168 hours.  No results found for this or any previous visit (from the past 24 hour(s)).    MD Jessica    Billing: This patient is critically ill: Yes. Total critical care time today 40 min.

## 2022-12-05 NOTE — CONSULTS
"SPIRITUAL HEALTH SERVICES Progress Note       ICU     Visited with Blanco per-[facility] ASAP consult for support.    Blanco was awake and sitting up in his bed when I arrived. Per-consult order, request for support came from family.    Blanco was able to communicate using head nodding to indicate \"Yes\"/\"No\" responses. I confirmed with Blanco that he was [employer] and he indicated that prayer was meaningful for him. I offered emotional support and a prayer for Blanco at the bedside. I also confirmed with him that I would continue to follow-up regularly while admitted to [facility].      remains available to support this pt/family while admitted. Please consult or page if any immediate needs arise.    ------  Vitor Palacios M.Div.  Resident   Pager: [phone]   "

## 2022-12-05 NOTE — PROGRESS NOTES
[facility] ICU RESPIRATORY NOTE        Date of Admission: 11/12/2022    Date of Intubation (most recent): 11/26/22    Reason for Mechanical Ventilation: Respiratory failure    Number of Days on Mechanical Ventilation: 9    Met Criteria for Spontaneous Breathing Trial: Yes     Significant Events Today: Trach dome all day, full support overnight    ABG Results:   Recent Labs   Lab 11/30/22  0955   O2PER 30       Current Vent Settings: Vent Mode: CMV/AC  (Continuous Mandatory Ventilation/ Assist Control)  FiO2 (%): 40 %  Resp Rate (Set): 16 breaths/min  Tidal Volume (Set, mL): 450 mL  PEEP (cm H2O): 5 cmH2O  Pressure Support (cm H2O): 10 cmH2O  Resp: 10      Skin Assessment: Intact. Bloody secretions around stoma. Black scabs on interior and exterior of nostrils    Plan: Continue trach dome as tolerated    Malka Colindres on 12/5/2022 at 4:37 AM

## 2022-12-05 NOTE — PLAN OF CARE
Neuro: able to nod head when asked yes or no questions. PERRL. Wiggles toes to command. Grimaces to pain.   CV: SR-ST. Became hypotensive 60/40's. Levo restarted. Dr. Torres notified. 500mL NS bolus ordered.   Resp: Trach dome 30L, 40%. On vent support throughout the night due to tachypnea. Copious, thick inline secretions.   GI/: RT in place. Anuric.

## 2022-12-05 NOTE — PLAN OF CARE
Goal Outcome Evaluation:     No changes. Alert. Moves weakly all extremities and follows commands. Nods no to pain question. Up to chair BID with lift and tolerating activity. ST with PVCs. BP stable. On Trach Dome 30% 30L. Trach site bloody. Dialysis cath removed. Heparin gtt stopped and will start oral meds tomorrow. Gtube has continuous feeding. Rectal tube in place for liquid stool. Anuric. Wife at bedside. Plan for chest physiotherapy today chest Xray tomorrow.

## 2022-12-06 ENCOUNTER — APPOINTMENT (OUTPATIENT)
Dept: INTERVENTIONAL RADIOLOGY/VASCULAR | Facility: CLINIC | Age: 57
DRG: 004 | End: 2022-12-06
Attending: INTERNAL MEDICINE
Payer: COMMERCIAL

## 2022-12-06 LAB
ALBUMIN SERPL-MCNC: 1.9 G/DL (ref 3.4–5)
ANION GAP SERPL CALCULATED.3IONS-SCNC: 13 MMOL/L (ref 3–14)
APTT PPP: 28 SECONDS (ref 22–38)
BACTERIA FLD CULT: NO GROWTH
BUN SERPL-MCNC: 95 MG/DL (ref 7–30)
CALCIUM SERPL-MCNC: 7.9 MG/DL (ref 8.5–10.1)
CHLORIDE BLD-SCNC: 98 MMOL/L (ref 94–109)
CO2 SERPL-SCNC: 25 MMOL/L (ref 20–32)
CREAT SERPL-MCNC: 3.8 MG/DL (ref 0.66–1.25)
ERYTHROCYTE [DISTWIDTH] IN BLOOD BY AUTOMATED COUNT: 17.6 % (ref 10–15)
ERYTHROCYTE [DISTWIDTH] IN BLOOD BY AUTOMATED COUNT: 17.8 % (ref 10–15)
ERYTHROCYTE [DISTWIDTH] IN BLOOD BY AUTOMATED COUNT: 17.9 % (ref 10–15)
GFR SERPL CREATININE-BSD FRML MDRD: 18 ML/MIN/1.73M2
GLUCOSE BLD-MCNC: 178 MG/DL (ref 70–99)
GLUCOSE BLDC GLUCOMTR-MCNC: 119 MG/DL (ref 70–99)
GLUCOSE BLDC GLUCOMTR-MCNC: 144 MG/DL (ref 70–99)
GLUCOSE BLDC GLUCOMTR-MCNC: 161 MG/DL (ref 70–99)
GLUCOSE BLDC GLUCOMTR-MCNC: 168 MG/DL (ref 70–99)
GLUCOSE BLDC GLUCOMTR-MCNC: 170 MG/DL (ref 70–99)
GLUCOSE BLDC GLUCOMTR-MCNC: 190 MG/DL (ref 70–99)
HCT VFR BLD AUTO: 23.2 % (ref 40–53)
HCT VFR BLD AUTO: 25.5 % (ref 40–53)
HCT VFR BLD AUTO: 27.2 % (ref 40–53)
HGB BLD-MCNC: 7.1 G/DL (ref 13.3–17.7)
HGB BLD-MCNC: 7.8 G/DL (ref 13.3–17.7)
HGB BLD-MCNC: 8.4 G/DL (ref 13.3–17.7)
INR PPP: 1.16 (ref 0.85–1.15)
MCH RBC QN AUTO: 34 PG (ref 26.5–33)
MCH RBC QN AUTO: 34.3 PG (ref 26.5–33)
MCH RBC QN AUTO: 34.5 PG (ref 26.5–33)
MCHC RBC AUTO-ENTMCNC: 30.6 G/DL (ref 31.5–36.5)
MCHC RBC AUTO-ENTMCNC: 30.6 G/DL (ref 31.5–36.5)
MCHC RBC AUTO-ENTMCNC: 30.9 G/DL (ref 31.5–36.5)
MCV RBC AUTO: 110 FL (ref 78–100)
MCV RBC AUTO: 112 FL (ref 78–100)
MCV RBC AUTO: 113 FL (ref 78–100)
PATH REPORT.COMMENTS IMP SPEC: NORMAL
PATH REPORT.COMMENTS IMP SPEC: NORMAL
PATH REPORT.FINAL DX SPEC: NORMAL
PATH REPORT.GROSS SPEC: NORMAL
PATH REPORT.RELEVANT HX SPEC: NORMAL
PHOSPHATE SERPL-MCNC: 4.3 MG/DL (ref 2.5–4.5)
PLAT MORPH BLD: ABNORMAL
PLATELET # BLD AUTO: 43 10E3/UL (ref 150–450)
PLATELET # BLD AUTO: 59 10E3/UL (ref 150–450)
PLATELET # BLD AUTO: 76 10E3/UL (ref 150–450)
POTASSIUM BLD-SCNC: 3.8 MMOL/L (ref 3.4–5.3)
RBC # BLD AUTO: 2.07 10E6/UL (ref 4.4–5.9)
RBC # BLD AUTO: 2.26 10E6/UL (ref 4.4–5.9)
RBC # BLD AUTO: 2.47 10E6/UL (ref 4.4–5.9)
RBC MORPH BLD: ABNORMAL
SODIUM SERPL-SCNC: 136 MMOL/L (ref 133–144)
TARGETS BLD QL SMEAR: SLIGHT
WBC # BLD AUTO: 3.3 10E3/UL (ref 4–11)
WBC # BLD AUTO: 4.9 10E3/UL (ref 4–11)
WBC # BLD AUTO: 8 10E3/UL (ref 4–11)

## 2022-12-06 PROCEDURE — 0JH63XZ INSERTION OF TUNNELED VASCULAR ACCESS DEVICE INTO CHEST SUBCUTANEOUS TISSUE AND FASCIA, PERCUTANEOUS APPROACH: ICD-10-PCS | Performed by: RADIOLOGY

## 2022-12-06 PROCEDURE — 94640 AIRWAY INHALATION TREATMENT: CPT | Mod: 76

## 2022-12-06 PROCEDURE — 88112 CYTOPATH CELL ENHANCE TECH: CPT | Mod: 26

## 2022-12-06 PROCEDURE — C1750 CATH, HEMODIALYSIS,LONG-TERM: HCPCS

## 2022-12-06 PROCEDURE — 999N000157 HC STATISTIC RCP TIME EA 10 MIN

## 2022-12-06 PROCEDURE — 90937 HEMODIALYSIS REPEATED EVAL: CPT

## 2022-12-06 PROCEDURE — 32555 ASPIRATE PLEURA W/ IMAGING: CPT

## 2022-12-06 PROCEDURE — 250N000013 HC RX MED GY IP 250 OP 250 PS 637: Performed by: INTERNAL MEDICINE

## 2022-12-06 PROCEDURE — C1769 GUIDE WIRE: HCPCS

## 2022-12-06 PROCEDURE — 36558 INSERT TUNNELED CV CATH: CPT

## 2022-12-06 PROCEDURE — 88305 TISSUE EXAM BY PATHOLOGIST: CPT | Mod: 26

## 2022-12-06 PROCEDURE — 250N000009 HC RX 250: Performed by: INTERNAL MEDICINE

## 2022-12-06 PROCEDURE — 250N000013 HC RX MED GY IP 250 OP 250 PS 637: Performed by: PHYSICIAN ASSISTANT

## 2022-12-06 PROCEDURE — 250N000011 HC RX IP 250 OP 636: Performed by: NURSE PRACTITIONER

## 2022-12-06 PROCEDURE — 0W9B3ZZ DRAINAGE OF LEFT PLEURAL CAVITY, PERCUTANEOUS APPROACH: ICD-10-PCS | Performed by: RADIOLOGY

## 2022-12-06 PROCEDURE — 94640 AIRWAY INHALATION TREATMENT: CPT

## 2022-12-06 PROCEDURE — 250N000011 HC RX IP 250 OP 636: Performed by: RADIOLOGY

## 2022-12-06 PROCEDURE — 80069 RENAL FUNCTION PANEL: CPT | Performed by: INTERNAL MEDICINE

## 2022-12-06 PROCEDURE — 250N000011 HC RX IP 250 OP 636: Performed by: INTERNAL MEDICINE

## 2022-12-06 PROCEDURE — 250N000009 HC RX 250: Performed by: NURSE PRACTITIONER

## 2022-12-06 PROCEDURE — 85610 PROTHROMBIN TIME: CPT | Performed by: INTERNAL MEDICINE

## 2022-12-06 PROCEDURE — 85027 COMPLETE CBC AUTOMATED: CPT | Performed by: INTERNAL MEDICINE

## 2022-12-06 PROCEDURE — 272N000196 HC ACCESSORY CR5

## 2022-12-06 PROCEDURE — 250N000009 HC RX 250: Performed by: PHYSICIAN ASSISTANT

## 2022-12-06 PROCEDURE — 85014 HEMATOCRIT: CPT | Performed by: INTERNAL MEDICINE

## 2022-12-06 PROCEDURE — 02HV33Z INSERTION OF INFUSION DEVICE INTO SUPERIOR VENA CAVA, PERCUTANEOUS APPROACH: ICD-10-PCS | Performed by: RADIOLOGY

## 2022-12-06 PROCEDURE — 272N000195 HC ACCESSORY CR4

## 2022-12-06 PROCEDURE — 999N000185 HC STATISTIC TRANSPORT TIME EA 15 MIN

## 2022-12-06 PROCEDURE — 200N000001 HC R&B ICU

## 2022-12-06 PROCEDURE — 99291 CRITICAL CARE FIRST HOUR: CPT | Performed by: INTERNAL MEDICINE

## 2022-12-06 PROCEDURE — 250N000013 HC RX MED GY IP 250 OP 250 PS 637: Performed by: STUDENT IN AN ORGANIZED HEALTH CARE EDUCATION/TRAINING PROGRAM

## 2022-12-06 PROCEDURE — G0463 HOSPITAL OUTPT CLINIC VISIT: HCPCS

## 2022-12-06 PROCEDURE — 99232 SBSQ HOSP IP/OBS MODERATE 35: CPT | Performed by: INTERNAL MEDICINE

## 2022-12-06 PROCEDURE — 272N000602 HC WOUND GLUE CR1

## 2022-12-06 PROCEDURE — 250N000012 HC RX MED GY IP 250 OP 636 PS 637: Performed by: NURSE PRACTITIONER

## 2022-12-06 PROCEDURE — 85730 THROMBOPLASTIN TIME PARTIAL: CPT | Performed by: INTERNAL MEDICINE

## 2022-12-06 PROCEDURE — 272N000500 HC NEEDLE CR2

## 2022-12-06 PROCEDURE — 94668 MNPJ CHEST WALL SBSQ: CPT

## 2022-12-06 PROCEDURE — 94003 VENT MGMT INPAT SUBQ DAY: CPT

## 2022-12-06 RX ORDER — NALOXONE HYDROCHLORIDE 0.4 MG/ML
0.4 INJECTION, SOLUTION INTRAMUSCULAR; INTRAVENOUS; SUBCUTANEOUS
Status: DISCONTINUED | OUTPATIENT
Start: 2022-12-06 | End: 2022-12-06 | Stop reason: HOSPADM

## 2022-12-06 RX ORDER — NALOXONE HYDROCHLORIDE 0.4 MG/ML
0.2 INJECTION, SOLUTION INTRAMUSCULAR; INTRAVENOUS; SUBCUTANEOUS
Status: DISCONTINUED | OUTPATIENT
Start: 2022-12-06 | End: 2022-12-06 | Stop reason: HOSPADM

## 2022-12-06 RX ORDER — OXYCODONE HYDROCHLORIDE 5 MG/1
5 TABLET ORAL EVERY 4 HOURS PRN
Status: DISCONTINUED | OUTPATIENT
Start: 2022-12-06 | End: 2022-12-15 | Stop reason: HOSPADM

## 2022-12-06 RX ORDER — FENTANYL CITRATE 50 UG/ML
25-50 INJECTION, SOLUTION INTRAMUSCULAR; INTRAVENOUS EVERY 5 MIN PRN
Status: DISCONTINUED | OUTPATIENT
Start: 2022-12-06 | End: 2022-12-06 | Stop reason: HOSPADM

## 2022-12-06 RX ORDER — FLUMAZENIL 0.1 MG/ML
0.2 INJECTION, SOLUTION INTRAVENOUS
Status: DISCONTINUED | OUTPATIENT
Start: 2022-12-06 | End: 2022-12-06 | Stop reason: HOSPADM

## 2022-12-06 RX ORDER — HEPARIN SODIUM 1000 [USP'U]/ML
5000 INJECTION, SOLUTION INTRAVENOUS; SUBCUTANEOUS ONCE
Status: COMPLETED | OUTPATIENT
Start: 2022-12-06 | End: 2022-12-06

## 2022-12-06 RX ORDER — CEFAZOLIN SODIUM 2 G/100ML
2 INJECTION, SOLUTION INTRAVENOUS
Status: COMPLETED | OUTPATIENT
Start: 2022-12-06 | End: 2022-12-06

## 2022-12-06 RX ADMIN — FOLIC ACID 1 MG: 5 INJECTION, SOLUTION INTRAMUSCULAR; INTRAVENOUS; SUBCUTANEOUS at 08:24

## 2022-12-06 RX ADMIN — HYDROCORTISONE SODIUM SUCCINATE 25 MG: 100 INJECTION, POWDER, FOR SOLUTION INTRAMUSCULAR; INTRAVENOUS at 08:24

## 2022-12-06 RX ADMIN — OXYCODONE HYDROCHLORIDE 5 MG: 5 TABLET ORAL at 19:47

## 2022-12-06 RX ADMIN — HYDROCORTISONE SODIUM SUCCINATE 25 MG: 100 INJECTION, POWDER, FOR SOLUTION INTRAMUSCULAR; INTRAVENOUS at 01:57

## 2022-12-06 RX ADMIN — ACYCLOVIR: 50 OINTMENT TOPICAL at 05:32

## 2022-12-06 RX ADMIN — ACETYLCYSTEINE 2 ML: 200 SOLUTION ORAL; RESPIRATORY (INHALATION) at 07:46

## 2022-12-06 RX ADMIN — HEPARIN SODIUM 5000 UNITS: 1000 INJECTION INTRAVENOUS; SUBCUTANEOUS at 11:13

## 2022-12-06 RX ADMIN — FENTANYL CITRATE 25 MCG: 50 INJECTION, SOLUTION INTRAMUSCULAR; INTRAVENOUS at 10:42

## 2022-12-06 RX ADMIN — ACETYLCYSTEINE 2 ML: 200 SOLUTION ORAL; RESPIRATORY (INHALATION) at 15:18

## 2022-12-06 RX ADMIN — APIXABAN 5 MG: 5 TABLET, FILM COATED ORAL at 21:14

## 2022-12-06 RX ADMIN — ACYCLOVIR: 50 OINTMENT TOPICAL at 21:14

## 2022-12-06 RX ADMIN — ACETYLCYSTEINE 2 ML: 200 SOLUTION ORAL; RESPIRATORY (INHALATION) at 12:06

## 2022-12-06 RX ADMIN — TACROLIMUS 1 MG: 5 CAPSULE ORAL at 08:24

## 2022-12-06 RX ADMIN — LACTULOSE 10 G: 20 SOLUTION ORAL at 21:14

## 2022-12-06 RX ADMIN — ACYCLOVIR: 50 OINTMENT TOPICAL at 13:34

## 2022-12-06 RX ADMIN — HYDROCORTISONE SODIUM SUCCINATE 25 MG: 100 INJECTION, POWDER, FOR SOLUTION INTRAMUSCULAR; INTRAVENOUS at 19:48

## 2022-12-06 RX ADMIN — CHLORHEXIDINE GLUCONATE 0.12% ORAL RINSE 15 ML: 1.2 LIQUID ORAL at 08:24

## 2022-12-06 RX ADMIN — MIDAZOLAM 0.5 MG: 1 INJECTION INTRAMUSCULAR; INTRAVENOUS at 10:42

## 2022-12-06 RX ADMIN — LACTULOSE 10 G: 20 SOLUTION ORAL at 15:02

## 2022-12-06 RX ADMIN — LACTULOSE 10 G: 20 SOLUTION ORAL at 08:24

## 2022-12-06 RX ADMIN — INSULIN ASPART 3 UNITS: 100 INJECTION, SOLUTION INTRAVENOUS; SUBCUTANEOUS at 20:05

## 2022-12-06 RX ADMIN — CEFAZOLIN SODIUM 2 G: 2 INJECTION, SOLUTION INTRAVENOUS at 10:44

## 2022-12-06 RX ADMIN — Medication 1 DROP: at 21:14

## 2022-12-06 RX ADMIN — Medication 1 DROP: at 08:24

## 2022-12-06 RX ADMIN — CHLORHEXIDINE GLUCONATE 0.12% ORAL RINSE 15 ML: 1.2 LIQUID ORAL at 19:48

## 2022-12-06 RX ADMIN — Medication 40 MG: at 08:24

## 2022-12-06 RX ADMIN — IPRATROPIUM BROMIDE AND ALBUTEROL SULFATE 3 ML: .5; 3 SOLUTION RESPIRATORY (INHALATION) at 15:18

## 2022-12-06 RX ADMIN — IPRATROPIUM BROMIDE AND ALBUTEROL SULFATE 3 ML: .5; 3 SOLUTION RESPIRATORY (INHALATION) at 12:06

## 2022-12-06 RX ADMIN — Medication 1 DROP: at 15:02

## 2022-12-06 RX ADMIN — INSULIN ASPART 1 UNITS: 100 INJECTION, SOLUTION INTRAVENOUS; SUBCUTANEOUS at 15:02

## 2022-12-06 RX ADMIN — INSULIN ASPART 2 UNITS: 100 INJECTION, SOLUTION INTRAVENOUS; SUBCUTANEOUS at 08:30

## 2022-12-06 RX ADMIN — HYDROCORTISONE SODIUM SUCCINATE 25 MG: 100 INJECTION, POWDER, FOR SOLUTION INTRAMUSCULAR; INTRAVENOUS at 13:32

## 2022-12-06 RX ADMIN — Medication 15 ML: at 08:24

## 2022-12-06 RX ADMIN — TACROLIMUS 1 MG: 5 CAPSULE ORAL at 21:14

## 2022-12-06 RX ADMIN — IPRATROPIUM BROMIDE AND ALBUTEROL SULFATE 3 ML: .5; 3 SOLUTION RESPIRATORY (INHALATION) at 07:46

## 2022-12-06 RX ADMIN — IPRATROPIUM BROMIDE AND ALBUTEROL SULFATE 3 ML: .5; 3 SOLUTION RESPIRATORY (INHALATION) at 20:07

## 2022-12-06 RX ADMIN — ACETYLCYSTEINE 2 ML: 200 SOLUTION ORAL; RESPIRATORY (INHALATION) at 20:07

## 2022-12-06 ASSESSMENT — ACTIVITIES OF DAILY LIVING (ADL)
ADLS_ACUITY_SCORE: 36
ADLS_ACUITY_SCORE: 40
ADLS_ACUITY_SCORE: 36
ADLS_ACUITY_SCORE: 40
ADLS_ACUITY_SCORE: 36

## 2022-12-06 NOTE — PROGRESS NOTES
[facility] Nurse Inpatient Assessment     Consulted for: Philtrum/Columella, bilateral buttock, right nose, upper lip, upper chin, chest skin tear      Patient History (according to provider note(s):      57M cirrhosis, CARRILLO s/p liver txp (9/2016) admitted to ICU 11/12/22 after out of hospital PEA arrest and acute hypoxemic respiratory failure. Course c/b LORETTA requiring CRRT. Found to have Parainfluenza virus and Streptococcal bacteremia.     Areas Assessed:      Areas visualized during today's visit: Face and neck and Sacrum/coccyx    Pressure Injury Location: Philtrum/Columella    Last photo: 12/6/22 11/16/22  Philtrum/Columella    Right lateral nose 11/16  Wound type: Pressure Injury and Viral Lesions      Wound history/plan of care:  Wound initial suspected to be pressure as RT had found a tight ETAD and performed appropriate interventions. 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     Tunneling N/A     Undermining N/A  Periwound skin: Intact      Color: normal and consistent with surrounding tissue      Temperature: normal   Odor: none  Pain: mild, tender per patient  Pain intervention prior to dressing change: slow and gentle cares   Treatment goal: Heal  and Protection  STATUS: stable  Supplies ordered: supplies stored on unit and discussed with RN     Wound location: Right nose    Last photo: 12/6/22 11/16/22    Wound due to: Herpetic Lesion  Wound base: 100% dried drainage.      Palpation of the wound bed: normal      Drainage: scant     Description of drainage: serosanguinous     Measurements (length x width x depth, in cm): 1.5cm x 1cm x  UTD       Tunneling: N/A     Undermining: N/A  Periwound skin: Intact       Color: normal and consistent with surrounding tissue      Temperature: normal   Odor: none  Pain: no grimacing or signs of discomfort,   Pain interventions prior to dressing change: slow and gentle cares   Treatment goal: Heal  and Protection  STATUS: stable  Supplies ordered: supplies stored on unit and discussed with RN    Pressure Injury Location: Bilateral buttock- healed 12/6    Last photo:12/6/22 11/16/22    Wound type: Pressure Injury and Friction     Pressure Injury Stage: 1, hospital acquired      Wound history/plan of care:  On admission, Patient has tan nonblanchable tissue to bilateral buttock that appears more likely from friction of the two buttocks rubbing against one another at they mirror one another, this discoloration appears to be old and in the healing process. However there is nonblanchable reddened tissue laterally to the left buttock that appears new, possibly from linens under patient.    Wound description : epidermis     Palpation of the wound bed: normal      Drainage: none     Description of drainage: none  STATUS: healed  Supplies ordered: supplies stored on unit and discussed with RN      Wound location: mid upper lip    Last photo: 12/6/22       Wound due to: hereptic lesion,     Wound history/plan of care: Patient had emergent intubation on 11/22. HSV 1 positive. No devices appear to be resting on injury to indicate pressure.     Wound base: 100 % pink dry tissue     Palpation of the wound bed: normal      Drainage: moderate     Description of drainage: bloody     Measurements (length x width x depth, in cm): 0.4cm  x 0.7cm  x  0 cm      Tunneling: N/A     Undermining: N/A  Periwound skin: Intact      Color: normal and consistent with surrounding tissue      Temperature: normal   Odor: none  Pain: no grimacing or signs of discomfort, none  Pain interventions prior to dressing change: slow and gentle cares   Treatment goal: Heal  and Protection  STATUS: stable  Supplies  ordered: supplies stored on unit and discussed with RN    Wound location: Right upper chin    Last photo: 12/6/22        Wound due to: herpetic lesion  Wound history/plan of care: Wound does not appear to be from pressure, no devices on this area especially without causing pressure further on lip.HSV1 positive   Wound base: 100 % dry drainage     Palpation of the wound bed: normal      Drainage: none     Description of drainage: none     Measurements (length x width x depth, in cm): 0.5  x 0.5  x  0 cm      Tunneling: N/A     Undermining: N/A  Periwound skin: Intact      Color: normal and consistent with surrounding tissue      Temperature: normal   Odor: none  Pain: no grimacing or signs of discomfort, none  Pain interventions prior to dressing change: patient tolerated well  Treatment goal: Heal , leave ONEIL  STATUS: stable     Wound location: Upper chest- healed 12/6    Last photo: 11/29/22        Wound due to: Skin Tear  Wound history/plan of care: Unknown skin tear. Patient with surrounding ecchymosis. Low platelets and on heparin.  Wound base: 100 % epidermis, ecchymotic     Palpation of the wound bed: normal      Drainage: none     Description of drainage: none     Tunneling: N/A     Undermining: N/A  Periwound skin: Ecchymosis      Color: purple      Temperature: normal   Odor: none  Pain: no grimacing or signs of discomfort, none  Pain interventions prior to dressing change: patient tolerated well  Treatment goal: Heal  and Protection  STATUS: healed  Supplies ordered: supplies stored on unit      Treatment Plan:     Columella/Philtrum/Nose/ Upper lip/Lower lip wound(s): Every shift   1. Cleanse site with saline. Pat dry with gauze.  2. Ensure Etad has Q tip clearance and place cut strip of Mepilex 4x4 or Optifoam under ETAD to ensure offloading.  3. Apply acyclovir Q8H.   Avoid vaseline       Pressure Injury prevention (please order supplies if not in room)  1. Turn/reposition every 1-2 hrs  2.   Float  heels off bed with use of pillows or if needed Heel Lift boots (Prevalon #021075)  3.   If incontinent Cleanse with incontinent cleanser (Yara spray #[id number]) followed by skin barrier protectant (Critic Aid paste)  BID and after each incontinent episode  4.   Prevent sliding and shear by limiting HOB to 30 degrees or less unless contraindicated, use knee gatch first if not contraindicated  5.   Chair cushion pressure redistribution as needed (#[id number]): please limit sitting to an hour at a time  6.   Optimize nutrition   7.   PULSATE low air loss mattress     Orders: Reviewed and Updated    RECOMMEND PRIMARY TEAM ORDER: None, at this time  Education provided: importance of repositioning, plan of care, Moisture management, Hygiene and Off-loading pressure  Discussed plan of care with: Nurse  WOC nurse follow-up plan: weekly  Notify WOC if wound(s) deteriorate.  Nursing to notify the Provider(s) and re-consult the WOC Nurse if new skin concern.    DATA:     Current support surface: Standard  Low air loss mattress with pulsation , ICU  Containment of urine/stool: Incontinent pad in bed and Internal fecal management  BMI: Body mass index is 25.95 kg/m .   Active diet order: Orders Placed This Encounter      NPO for Medical/Clinical Reasons Except for: Other; Specify: NPO For oral intake and gastric feedings for 6 hours post insertion Gastrostomy G/GJ tube. May feed through Jejunal or Distal PORT immediately for GJ tubes ONLY.     Output: I/O last 3 completed shifts:  In: 1279.8 [I.V.:249.8; NG/GT:150]  Out: 225 [Stool:225]     Labs:   Recent Labs   Lab 12/06/22  0513   ALBUMIN 1.9*   HGB 7.8*   INR 1.16*   WBC 8.0     Pressure injury risk assessment:   Sensory Perception: 3-->slightly limited  Moisture: 3-->occasionally moist  Activity: 2-->chairfast  Mobility: 1-->completely immobile  Nutrition: 3-->adequate  Friction and Shear: 1-->problem  Kareem Score: 13    Ashleigh GAMBLEOCN   Dept. Pager: [phone]  Dept.  Office Number: [phone]

## 2022-12-06 NOTE — PLAN OF CARE
Goal Outcome Evaluation:       Waiting for LTAC bed. Tunnel cath for dialysis placed and thoracentesis completed with 200ml out. Patient VSS. Off Levo gtt. Up to chair.

## 2022-12-06 NOTE — PROGRESS NOTES
Renal Medicine Progress Note            Assessment/Plan:     Blanco Osborne is a 57-year-old male with PMH CARRILLO s/p liver transplant (9/2016) and cirrhosis admitted 11/12/2022 with out of hospital PEA arrest and acute hypoxemic respiratory failure. Course complicated by parainfluenza virus, streptococcal bacteremia, and LORETTA requiring CRRT.         1.  Severe anuric LORETTA:               -CRRT stopped 11/22 and resume on 11/24 and stopped 11/26.               -IHD started 11/29/30. Last session 12/4/22.     - Ongoing HD confirmed by ICU team with family.    2.  Septic shock with MODS:                -no current pressors                  3.  Respiratory failure: current 30%  FIo2              -multiple re intubations               -R Pneumothorax               -Aspergillus PNA             4.  ESLD due to CARRILLO s/p liver transplant                - tacrolimus 1mg BID     5.  Hypoalbuminemia      6. Anemia     Plan:  1) IR tunneled catheter planned this am  2) HD today in PM.   3) No nephrology barriers to transfer to LTACH. Can continue 3x/week HD and monitoring for renal recovery.    Torsten Montaño DO  [facility] consultants  Office: [phone]  Cell: [phone]        Interval History:     Pt with removal of temp internal jugular cvc, having planned tunneled dialysis catheter placement and thoracentesis today.  Brief lower BP's, had period levophed. Awake, alert, appears despondent.          Medications and Allergies:       - MEDICATION INSTRUCTIONS for Dialysis Patients -   Does not apply See Admin Instructions     sodium chloride 0.9%  250 mL Intravenous Once in dialysis/CRRT     sodium chloride 0.9%  300 mL Hemodialysis Machine Once     acetylcysteine  2 mL Nebulization 4x Daily     acyclovir   Topical Q8H     apixaban ANTICOAGULANT  5 mg Oral BID     artificial tears  1 drop Both Eyes TID     ceFAZolin  2 g Intravenous Pre-Op/Pre-procedure x 1 dose     chlorhexidine  15 mL Mouth/Throat Q12H     folic acid  1 mg  Intravenous Daily     sodium chloride (PF) 0.9%  10 mL Intracatheter Once in dialysis/CRRT    Followed by     heparin  1.3-2.6 mL Intracatheter Once in dialysis/CRRT     sodium chloride (PF) 0.9%  10 mL Intracatheter Once in dialysis/CRRT    Followed by     heparin  1.3-2.6 mL Intracatheter Once in dialysis/CRRT     heparin lock flush  5-20 mL Intracatheter Q24H     hydrocortisone sodium succinate PF  25 mg Intravenous Q6H     insulin aspart  1-12 Units Subcutaneous Q4H     insulin glargine  20 Units Subcutaneous QAM AC     ipratropium - albuterol 0.5 mg/2.5 mg/3 mL  3 mL Nebulization 4x daily     lactulose  10 g Oral or Feeding Tube TID     multivitamins w/minerals  15 mL Per Feeding Tube Daily     - MEDICATION INSTRUCTIONS -   Does not apply Once     pantoprazole  40 mg Per Feeding Tube QAM AC    Or     pantoprazole  40 mg Intravenous QAM AC     polyethylene glycol  17 g Oral or Feeding Tube Daily     senna-docusate  1 tablet Oral or Feeding Tube BID    Or     senna-docusate  2 tablet Oral or Feeding Tube BID     sodium chloride (PF)  10-40 mL Intracatheter Q8H     tacrolimus  1 mg Oral or Feeding Tube BID      No Known Allergies         Physical Exam:   Vitals were reviewed  BP (!) 85/56   Pulse 94   Temp 97.8  F (36.6  C) (Oral)   Resp 17   Ht 1.829 m (6')   Wt 86.8 kg (191 lb 5.8 oz)   SpO2 100%   BMI 25.95 kg/m      Wt Readings from Last 3 Encounters:   12/06/22 86.8 kg (191 lb 5.8 oz)   10/07/19 137.5 kg (303 lb 3.2 oz)   10/04/19 131.5 kg (290 lb)       Intake/Output Summary (Last 24 hours) at 12/6/2022 0916  Last data filed at 12/6/2022 0600  Gross per 24 hour   Intake 1106.8 ml   Output 175 ml   Net 931.8 ml       GENERAL APPEARANCE: frail, awake, able to follow commands  HEENT:  Trach present  RESP: lungs coarse b/l  CV: RRR, nl S1/S2   ABDOMEN: o/s/nt/nd, bs present  EXTREMITIES/SKIN: no c/c/rashes/lesions; no edema           Data:     BMP  Recent Labs   Lab 12/06/22  0829 12/06/22  0520  12/06/22  0513 12/06/22  0302 12/04/22  0952 12/04/22  0941 12/03/22  0751 12/03/22  0513 12/02/22  0851 12/02/22  0421   NA  --   --  136  --   --  138  --  137  --  138   POTASSIUM  --   --  3.8  --   --  3.7  --  3.8  --  4.1   CHLORIDE  --   --  98  --   --  102  --  102  --  103   KELLY  --   --  7.9*  --   --  7.9*  --  7.7*  --  8.2*   CO2  --   --  25  --   --  22  --  25  --  23   BUN  --   --  95*  --   --  107*  --  75*  --  95*   CR  --   --  3.80*  --   --  4.04*  --  3.14*  --  4.01*   * 168* 178* 161*   < > 234*   < > 210*   < > 219*    < > = values in this interval not displayed.     CBC  Recent Labs   Lab 12/06/22  0513 12/05/22  0440 12/03/22  0513 12/02/22  1352   WBC 8.0 11.9* 20.5* 22.2*   HGB 7.8* 7.9* 8.1* 10.4*   HCT 25.5* 25.9* 25.9* 32.8*   * 112* 109* 107*   PLT 76* 83* 74* 113*     Lab Results   Component Value Date    AST 13 12/03/2022    ALT 25 12/03/2022    ALKPHOS 240 (H) 12/03/2022    BILITOTAL 1.0 12/03/2022    CHANDLER 76 (H) 11/30/2022     Lab Results   Component Value Date    INR 1.16 (H) 12/06/2022       Attestation:  I have reviewed today's vital signs, notes, medications, labs and imaging.    DO [facility] - Nephrology  Office: [phone]  Cell: [phone]

## 2022-12-06 NOTE — PROGRESS NOTES
Patient transported to IR for thoracentesis and Tunnel dialysis catheter placement. No issues to report, patient now back in adult ICU.     Moises Martinez, RRT on 12/6/2022 at 12:06 PM

## 2022-12-06 NOTE — PROGRESS NOTES
[facility] ICU RESPIRATORY NOTE        Date of Admission: 11/12/2022    Date of Intubation (most recent): 11/26/2022    Reason for Mechanical Ventilation: Respiratory Failure    Number of Days on Mechanical Ventilation: Day 11    Met Criteria for Spontaneous Breathing Trial: Patient on full support for today due to mucus plugging/atelectasis overnight.     Significant Events Today: Patient transported to IR for thoracentesis and tunnel dialysis catheter placement.     ABG Results:   Recent Labs   Lab 12/05/22  2251 11/30/22  0955   O2PER 40 30       Current Vent Settings: Vent Mode: CMV/AC  (Continuous Mandatory Ventilation/ Assist Control)  FiO2 (%): 30 %  Resp Rate (Set): 16 breaths/min  Tidal Volume (Set, mL): 450 mL  PEEP (cm H2O): 7 cmH2O  Pressure Support (cm H2O): 10 cmH2O  Resp: 22      Plan: Continue to monitor on full ventilatory support. Suction as needed and assess for weaning/placement on trach dome.     Moises Martinez, RRT on 12/6/2022 at 5:16 PM

## 2022-12-06 NOTE — PROGRESS NOTES
Patient with more labored breathing on trach domes and easily desats.   CXR obtained and show significant increase in left opacity, with fluid or atelectasis although no tracheal deviation to left.    will check bedside US.  If atelectasis may need bronch in AM   Will place back on CMV 16 x 500 PEEP 7 and obtain VBG.

## 2022-12-06 NOTE — PROGRESS NOTES
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 Acute respiratory failure - he is on 30% and +7 PEEP, trach in place and has been receiving pulmonary hygiene measures for 24 hours. Last nights CXR noted and he had a thoracentesis today. He has diminished but improved breath sounds on left today, and will check CXR in AM.   2. Pneumonia - strep and parainfluenza; He has completed antibiotics and remains off them; monitor closely.   3. ID- voriconazole off; completed 15 days of Rocephin  4. Shock - he is off vasopressors (though on briefly earlier today)  4. Acute renal failure - HD tunnel cath placed today; HD this afternoon.    5. S/p initial arrest and prolonged resuscitation (11.9) and hypothermia resuscitation   6. History of Liver Transplant 2016 - tacrolimus at 1 mg bid and level 3.3; will check with liver Tx service regarding increasing dose. He remains on solucortef and dose decreased today from 50 q6h to 25 q6h. Liver Bx completed and somewhat inconclusive; cirrhosis without obvious evidence of rejection.  we will check tacrolimus levels in AM.   7. History of HTN  8. CHRISTIAN on BIPAP when not on vent   9. Nutrition - enteral resumed via PEG   10. CNS- awake and following commands, long  term CNS prognosis is likely favorable.  11. Atrial fibrillation, likely paroxysmal and with concern of prior CVA's will continue with apixaban at 5 BID and stop IV Heparin    Will reassess in AM and consider if he is ready for LTAC.               Summary/Hospital Course:           Assessment and plan :     Blanco Osborne IS a 57 year old male admitted on 11/12/2022 for acute respiratory failure.   I have personally reviewed the daily labs, imaging studies, cultures and discussed the case with referring physician and consulting physicians.     My assessment and plan by system for this patient is as follows:    Neurology/Psychiatry:   1. Alert and following commands on vent; moves 4 extremities albeit weak.     Cardiovascular:   1.Hemodynamics - well compensated at present     Pulmonary/Ventilator Management:   1. 30% and +7 PEEP and still requiring aggressive pulmonary hygiene     GI and Nutrition :   1. Enteral nutrition     Renal/Fluids/Electrolytes:   1. Still requiring aggressive pulmonary hygiene     Infectious Disease:   1. Off antibiotics at present     Endocrine:   1. Lantus increased today.     Hematology/Oncology:   1. Hb 7.1 today      IV/Access:   1. Venous access -   2. Arterial access -   3.  Plan  - central access required and necessary      ICU Prophylaxis:   1. DVT: Hep Subq/ LMWH/mechanical  2. VAP: HOB 30 degrees, chlorhexidine rinse  3. Stress Ulcer: PPI/H2 blocker  4. Restraints: Nonviolent soft two point restraints required and necessary for patient safety and continued cares and good effect as patient continues to pull at necessary lines, tubes despite education and distraction. Will readdress daily.   5. IV Access - central access required and necessary for continued patient cares  6. Feeding - enteral nutrition   7. Family Update: discussed with wife at bedside   8. Disposition - ICU care         Key goals for next 24 hours:   1. Tunnel cath placed today   2. Thoracentesis today    3.               Interim History:              Key Medications:       - MEDICATION INSTRUCTIONS for Dialysis Patients -   Does not apply See Admin Instructions     sodium chloride 0.9%  250 mL Intravenous Once in dialysis/CRRT     sodium chloride 0.9%  300 mL Hemodialysis Machine Once     acetylcysteine  2 mL Nebulization 4x Daily     acyclovir   Topical Q8H     apixaban ANTICOAGULANT  5 mg Oral or Feeding Tube BID     artificial tears  1 drop Both Eyes TID     chlorhexidine  15 mL Mouth/Throat Q12H     folic acid  1 mg Intravenous Daily     sodium chloride (PF) 0.9%  10 mL Intracatheter Once in dialysis/CRRT    Followed by     heparin  1.3-2.6 mL Intracatheter Once in dialysis/CRRT     sodium chloride (PF) 0.9%  10 mL Intracatheter Once in dialysis/CRRT    Followed by     heparin  1.3-2.6 mL Intracatheter Once in dialysis/CRRT     heparin lock flush  5-20 mL Intracatheter Q24H     hydrocortisone sodium succinate PF  25 mg Intravenous Q6H     insulin aspart  1-12 Units Subcutaneous Q4H     insulin glargine  20 Units Subcutaneous QAM AC     ipratropium - albuterol 0.5 mg/2.5 mg/3 mL  3 mL Nebulization 4x daily     lactulose  10 g Oral or Feeding Tube TID     multivitamins w/minerals  15 mL Per Feeding Tube Daily     - MEDICATION INSTRUCTIONS -   Does not apply Once     pantoprazole  40 mg Per Feeding Tube QAM AC    Or     pantoprazole  40 mg Intravenous QAM AC     polyethylene glycol  17 g Oral or Feeding Tube Daily     senna-docusate  1 tablet Oral or Feeding Tube BID    Or     senna-docusate  2 tablet Oral or Feeding Tube BID     sodium chloride (PF)  10-40 mL Intracatheter Q8H     tacrolimus  1 mg Oral or Feeding Tube BID       dextrose       - MEDICATION INSTRUCTIONS -       - MEDICATION INSTRUCTIONS -       norepinephrine Stopped (12/06/22 1000)     sodium chloride 0 mL/hr at 11/22/22 0742               Physical Examination:   Temp:  [97.4  F (36.3  C)-98.6  F (37  C)] 97.4  F (36.3  C)  Pulse:  []  90  Resp:  [0-71] 10  BP: ()/(53-97) 101/71  FiO2 (%):  [30 %-40 %] 30 %  SpO2:  [91 %-100 %] 95 %    Intake/Output Summary (Last 24 hours) at 12/6/2022 1622  Last data filed at 12/6/2022 1600  Gross per 24 hour   Intake 836.3 ml   Output 125 ml   Net 711.3 ml     Wt Readings from Last 4 Encounters:   12/06/22 86.8 kg (191 lb 5.8 oz)   10/07/19 137.5 kg (303 lb 3.2 oz)   10/04/19 131.5 kg (290 lb)   02/20/19 140.4 kg (309 lb 9.6 oz)     BP - Mean:  [] 77  Vent Mode: CMV/AC  (Continuous Mandatory Ventilation/ Assist Control)  FiO2 (%): 30 %  Resp Rate (Set): 16 breaths/min  Tidal Volume (Set, mL): 450 mL  PEEP (cm H2O): 7 cmH2O  Pressure Support (cm H2O): 10 cmH2O  Resp: 10    Recent Labs   Lab 12/05/22  2251 11/30/22  0955   O2PER 40 30       GEN: no acute distress; comfortable on vent    HEENT: head ncat, sclera anicteric, OP patent, trachea midline   PULM: unlabored synchronous with vent, decreased breath sounds on left but improved   CV/COR: RRR S1S2 no gallop,  No rub, no murmur  ABD: soft nontender,   EXT:  Mild edema   warm  NEURO: grossly intact, but very weak   SKIN: no obvious rash; no cyanosis or mottling   LINES: clean, dry intact         Data:   All data and imaging reviewed     ROUTINE ICU LABS (Last four results)  CMP  Recent Labs   Lab 12/06/22  1501 12/06/22  1146 12/06/22  0829 12/06/22  0520 12/06/22  0513 12/05/22  0736 12/05/22  0440 12/04/22  0952 12/04/22  0941 12/03/22  0751 12/03/22  0513 12/02/22  0851 12/02/22  0421   NA  --   --   --   --  136  --   --   --  138  --  137  --  138   POTASSIUM  --   --   --   --  3.8  --   --   --  3.7  --  3.8  --  4.1   CHLORIDE  --   --   --   --  98  --   --   --  102  --  102  --  103   CO2  --   --   --   --  25  --   --   --  22  --  25  --  23   ANIONGAP  --   --   --   --  13  --   --   --  14  --  10  --  12   * 119* 170* 168* 178*   < > 212*   < > 234*   < > 210*   < > 219*   BUN  --   --   --   --  95*  --   --   --  107*  --   75*  --  95*   CR  --   --   --   --  3.80*  --   --   --  4.04*  --  3.14*  --  4.01*   GFRESTIMATED  --   --   --   --  18*  --   --   --  16*  --  22*  --  16*   KELLY  --   --   --   --  7.9*  --   --   --  7.9*  --  7.7*  --  8.2*   PHOS  --   --   --   --  4.3  --  3.7  --   --   --   --   --   --    PROTTOTAL  --   --   --   --   --   --   --   --   --   --  5.1*  --   --    ALBUMIN  --   --   --   --  1.9*  --   --   --   --   --  1.8*  --   --    BILITOTAL  --   --   --   --   --   --   --   --   --   --  1.0  --   --    ALKPHOS  --   --   --   --   --   --   --   --   --   --  240*  --   --    AST  --   --   --   --   --   --   --   --   --   --  13  --   --    ALT  --   --   --   --   --   --   --   --   --   --  25  --   --     < > = values in this interval not displayed.     CBC  Recent Labs   Lab 12/06/22 0513 12/05/22  0440 12/03/22 0513 12/02/22  1352   WBC 8.0 11.9* 20.5* 22.2*   RBC 2.26* 2.31* 2.38* 3.06*   HGB 7.8* 7.9* 8.1* 10.4*   HCT 25.5* 25.9* 25.9* 32.8*   * 112* 109* 107*   MCH 34.5* 34.2* 34.0* 34.0*   MCHC 30.6* 30.5* 31.3* 31.7   RDW 17.6* 17.8* 18.6* 19.2*   PLT 76* 83* 74* 113*     INR  Recent Labs   Lab 12/06/22 0513 12/01/22  1303   INR 1.16* 1.17*     Arterial Blood Gas  Recent Labs   Lab 12/05/22  2251 11/30/22  0955   O2PER 40 30       All cultures:  No results for input(s): CULT in the last 168 hours.  Recent Results (from the past 24 hour(s))   XR Chest Port 1 View    Narrative    EXAM: XR CHEST PORT 1 VIEW  LOCATION: [facility]  DATE/TIME: 12/5/2022 9:04 PM    INDICATION: atelectasis and desaturation  COMPARISON: 11/30/2022      Impression    IMPRESSION: There is significant opacification of the left hemithorax with aerated lung only at the apex. These findings suggest a large left pleural effusion associated atelectasis. No significant mediastinal shift. Right costophrenic angle blunting   suggests small right pleural effusion. Mid and upper  right lung are clear. Prior chest tube is been removed as has a IJ central catheter. Right-sided PIC catheter and tracheostomy tube remain in place.   IR Thoracentesis    Narrative    [facility]  DATE: 12/6/2022    PROCEDURE: IMAGING GUIDED LEFT THORACENTESIS    INTERVENTIONAL RADIOLOGIST: Cee Dos Santos MD    INDICATION: Left pleural effusion.    CONSENT: The risks, benefits and alternatives of an imaging guided  thoracentesis were discussed with the patient  in detail. All  questions were answered. Informed consent was given to proceed with  the procedure.    MODERATE SEDATION: None.    COMPLICATIONS: No immediate complications.    PROCEDURE:    A limited ultrasound was performed for localization purposes.    Using sterile technique 10 mL of Xylocaine was infused into the local  soft tissues. Under direct ultrasound guidance a 5 Grenadian catheter was  inserted into the pleural effusion.    A total of 200 mL of serosanguineous pleural fluid was removed and  sent to the lab if diagnostic analysis was requested.    FINDINGS:  The initial ultrasound shows a small pleural effusion.    Images obtained during catheter placement show the access needle with  tip in the pleural fluid.      Impression    IMPRESSION:    Successful ultrasound-guided thoracentesis, as discussed above.      CEE DOS SANTOS MD         SYSTEM ID:  [id number]   IR CVC Tunnel Placement > 5 Yrs of Age    Narrative    [facility]    DATE: 12/6/2022 11:21 AM    PROCEDURE: TUNNELED CENTRAL VENOUS CATHETER PLACEMENT    INTERVENTIONAL RADIOLOGIST: Cee Dos Santos MD    INDICATION: 57-year-old male with renal failure in need of dialysis    MODERATE SEDATION: Versed 0.5 mg and fentanyl 25 mcg were administered  intravenously with continuous vital signs monitoring by the radiology  nurse under my direct supervision. Patient was alert and oriented post  procedure. Total face to face moderate sedation time: 13 minutes.    ANTIBIOTICS: Ancef 2  gIV    ADDITIONAL MEDICATIONS: None.    FLUOROSCOPIC TIME: 0.2 minutes.    RADIATION DOSE: Air Kerma: 1 mGy    CONTRAST: None    COMPLICATIONS: No immediate complications.    STERILE BARRIER TECHNIQUE: Maximum sterile barrier technique was used.  Cutaneous antisepsis was performed at the operative site with  application of 2% chlorhexidine and large sterile drape. Prior to the  procedure, the  and assistant performed hand hygiene and wore  hat, mask, sterile gown, and sterile gloves during the entire  procedure.    PROCEDURE/TECHNIQUE: The procedure including the risks, benefits, and  alternatives to the procedure itself were discussed with the patient.  After all questions were answered informed consent obtained. The  patient was then brought to the Interventional Radiology suite, placed  in the supine position, and a timeout was performed. A limited  preliminary ultrasound demonstrated a patent right internal jugular  vein. The right side of the patient's neck and upper anterior chest  were then sterilely prepped and draped. After giving local anesthesia,  a 21 gauge needle was used to puncture the right internal jugular vein  from a low lateral approach under ultrasound guidance with an  ultrasound image stored. A 0.018 inch wire was then advanced through  the needle into the central veins under fluoroscopic guidance. The  needle was exchanged over the wire for a 5 Indonesian coaxial transitional  dilator. The wire tip was then situated at the cavoatrial junction as  confirmed fluoroscopically, and a length measurement was made. The  wire and inner 3 Indonesian dilator were then exchanged through the 5  Indonesian outer dilator for a 0.035 inch guidewire, which was advanced  under fluoroscopic guidance into the IVC. A site on the upper anterior  right chest was then anesthetized using lidocaine, as was a  subcutaneous track connecting this to the venipuncture site. An #11  blade was then used to make a small incision  on the upper anterior  right chest. The proximal end of a 19 cm tip-to-cuff length double  lumen hemodialysis catheter was then tunnelled from the incision to  the venipuncture site. The dilator in the vein was exchanged over the  0.035 inch wire for a 14 Mozambican dilator followed by a 16 Mozambican  peel-away sheath. The wire was then removed under controlled  respiration, the catheter was advanced through the peel-away sheath,  and the peel-away sheath was removed. Final fluoroscopic image  demonstrated the catheter tip to be positioned at the cavoatrial  junction. The catheter was accessed and noted to aspirate and flush  well. The venipuncture incision was then closed with a skin glue.  The  external hub of the catheter was secured with 2-0 nylon stitches. The  catheter was flushed with 1000 unit/ml heparinized saline. Sterile  dressings were applied. The patient appeared to tolerate the procedure  well.    The central venous catheter insertion checklist was reviewed prior to  placement and followed throughout the procedure.    FINDINGS:   1. ULTRASOUND: Patent and compressible right internal jugular vein.  Images recorded without and with compression.  2. Final fluoroscopic image demonstrates a right internal jugular  tunneled central venous catheter with tip at the cavoatrial junction.      Impression    IMPRESSION:    1.  Successful right tunneled central venous catheter placement.    CEE DOS SANTOS MD         SYSTEM ID:  [id number]       MD Jessica    Billing: This patient is critically ill: Yes. Total critical care time today 45 min.

## 2022-12-06 NOTE — PROGRESS NOTES
[facility] ICU RESPIRATORY NOTE        Date of Admission: 11/12/2022    Date of Intubation (most recent): 11/26/2022    Reason for Mechanical Ventilation: Respiratory failure.    Number of Days on Mechanical Ventilation: 10    Met Criteria for Spontaneous Breathing Trial: Yes -      Significant Events Today: Patient was on trach dome at the start of the shift. Patient had thick secretions, respiratory distress was noted. Switched patient back on the CPAP/PS briefly, patient was put back on CMV per MD request due to atelectasis after chest xray. Lavaged patient for large amount of secretions multiple time during shift. CPT via volera was done, neb treatment given as ordered.     ABG Results:   Recent Labs   Lab 12/05/22  2251 11/30/22  0955   O2PER 40 30       Current Vent Settings: Vent Mode: CMV/AC  (Continuous Mandatory Ventilation/ Assist Control)  FiO2 (%): 30 %  Resp Rate (Set): 16 breaths/min  Tidal Volume (Set, mL): 450 mL  PEEP (cm H2O): 7 cmH2O  Pressure Support (cm H2O): 10 cmH2O  Resp: 22      Skin Assessment: Red around the stoma.     Plan: Continue vent support and assess weaning in the morning. Continue aggressive pulmonary toilet.     Arvin High, RT on 12/6/2022 at 5:13 AM

## 2022-12-06 NOTE — PROGRESS NOTES
Potassium   Date Value Ref Range Status   12/06/2022 3.8 3.4 - 5.3 mmol/L Final   04/20/2020 3.9 3.4 - 5.3 mmol/L Final     Hemoglobin   Date Value Ref Range Status   12/06/2022 7.1 (L) 13.3 - 17.7 g/dL Final   04/20/2020 14.3 13.3 - 17.7 g/dL Final     Creatinine   Date Value Ref Range Status   12/06/2022 3.80 (H) 0.66 - 1.25 mg/dL Final   04/20/2020 1.06 0.66 - 1.25 mg/dL Final     Urea Nitrogen   Date Value Ref Range Status   12/06/2022 95 (H) 7 - 30 mg/dL Final   04/20/2020 23 7 - 30 mg/dL Final     Sodium   Date Value Ref Range Status   12/06/2022 136 133 - 144 mmol/L Final   04/20/2020 139 133 - 144 mmol/L Final     INR   Date Value Ref Range Status   12/06/2022 1.16 (H) 0.85 - 1.15 Final   10/07/2019 1.20 (H) 0.86 - 1.14 Final       DIALYSIS PROCEDURE NOTE  Hepatitis status of previous patient on machine log was checked and verified ok to use with this patients hepatitis status.  Patient dialyzed for 3.5 hrs. on a K4 bath with a net fluid removal of  2L.  A BFR of 350-400 ml/min was obtained via a RIJ.  Placement verified per physician Luis Workman's note signed at 1138.  The treatment plan was discussed with Dr. Montaño during the treatment.    Total heparin received during the treatment: 0 units.      Line flushed, clamped and capped with heparin 1:1000 1.3 mL (1300 units) per lumen    Meds  given: none   Complications: none; freq arterial alarms, reduced BFR to 350    Person educated: patient's significant other. Knowledge base none. Barriers to learning: none. Educated on procedure via verbal mode. patient/family verbalized understanding. Pt prefers verbal education style.     ICEBOAT? Timeout performed pre-treatment  I: Patient was identified using 2 identifiers  C:  Consent Signed Yes  E: Equipment preventative maintenance is current and dialysis delivery system OK to use  B: Hepatitis B Surface Antigen: Negative; Draw Date:11/14/22      Hepatitis B Surface Antibody: Susceptible; Draw Date: 11/14/22  O:  Dialysis orders present and complete prior to treatment  A: Vascular access verified and assessed prior to treatment  T: Treatment was performed at a clinically appropriate time  ?: Patient was allowed to ask questions and address concerns prior to treatment  See Adult Hemodialysis flowsheet in EPIC for further details and post assessment.  Machine water alarm in place and functioning. Transducer pods intact and checked every 15min.   Pt dialyzed at bedside in ICU.  Chlorine/Chloramine water system checked every 4 hours.  Outpatient Dialysis at D.    Patient repositioned every 2 hours during the treatment.  Post treatment report given to DANIS Rodriguez RN regarding 2L of fluid removed, last BP of 89/61, and patient pain rating of 0/10.

## 2022-12-06 NOTE — CONSULTS
Patient is on IR schedule today Tuesday 12/6/22 for a Tunneled dialysis catheter placement and possible left thoracentesis.    -Labs WNL for procedure.    -Orders for NPO and antibiotics have been entered.   -Consent will be done prior to procedure.     Please contact the IR department at [phone] for procedural related questions.     Discussed with ICU RN today.    Thanks, Nina [facility] Interventional Radiology CNP ([phone]) (phone [phone])

## 2022-12-06 NOTE — PLAN OF CARE
Shift summary [id number]:  Started on low dose Norepinephrine infusion due to persistent hypotension.  Copious secretions with aggressive pulmonary hygiene performed. Most recent CXR results discussed with Intensivist. Left side diminished breath sounds, tachypnea and desaturations  improved when on CMV/AC ventilator support overnight.        Problem: Plan of Care - These are the overarching goals to be used throughout the patient stay.    Goal: Readiness for Transition of Care  Outcome: Not Progressing  Flowsheets (Taken 12/6/2022 0640)  Anticipated Changes Related to Illness: inability to care for self  Transportation Anticipated: health plan transportation  Concerns to be Addressed: cognitive/perceptual  Barriers to Discharge: medical clearance not fully obtained.  Intervention: Mutually Develop Transition Plan  Recent Flowsheet Documentation  Taken 12/6/2022 0640 by Aracely Rider RN  Anticipated Changes Related to Illness: inability to care for self  Transportation Anticipated: health plan transportation  Concerns to be Addressed: cognitive/perceptual

## 2022-12-06 NOTE — CONSULTS
Consult completed. [facility] Interventional Radiology CNP ([phone]) (phone [phone])

## 2022-12-06 NOTE — PROGRESS NOTES
Phone consent was obtained from wife Ofe after explaining the procedure, risks and benefits and consent is in IR.     Thanks, Nina [facility] Interventional Radiology CNP ([phone]) (phone [phone])

## 2022-12-06 NOTE — PROVIDER NOTIFICATION
"Notified Intensivist at 2110 PM regarding radiology result, change in condition and changes in vital signs.  Desaturation with turn to Right side.  Placed on PS 10 and removed from trach dome. Suctioned moderate amount.   Shallow respirations noted on assessment.     Spoke with: Dr. Phillips    Orders were obtained.    Comments:  STAT CXR results: \"There is significant opacification of the left hemithorax with aerated lung only at the apex. These findings suggest a large left pleural effusion associated atelectasis. No significant mediastinal shift.\" . New vent orders obtained. VBG planned.      Addendum: Notified Intensivist at 2345 re: hypotension.  Orders obtained for initiation of Norepinephrine.  Copious thick secretions from trach. RT/RN repeated suction with external catheter needed.      "

## 2022-12-06 NOTE — PROGRESS NOTES
"Care Management Follow Up    Length of Stay (days): 24  Expected Discharge Date: 12/07/2022  Concerns to be Addressed: discharge planning, other (see comments) (medical procedures)     Patient plan of care discussed at interdisciplinary rounds: Yes  Anticipated Discharge Disposition: LTACH  Anticipated Discharge Services: None  Anticipated Discharge DME: Other (see comment) (TBD)  Patient/family educated on Medicare website which has current facility and service quality ratings: yes  Education Provided on the Discharge Plan:    Patient/Family in Agreement with the Plan: yes  Referrals Placed by CM/SW: Post Acute Facilities  Private pay costs discussed: Not applicable    Additional Information:  Spoke with CHRISTOPHER Sanchez liaison for [facility] at [facility]'s [phone].  Reviewed provider notes.  Per Dr. Tang 12/4 \"Patient very well may be ready for LTAC midweek.\"  Per Dr. Jones 12/5 \"LTAC when bed available.\"  Per IR 12/6 pt having \"Tunneled dialysis catheter placement and possible left thoracentesis.\"  Anticipate pt will not discharge 12/6 due to procedures.     Anticipate MD Perf-st-Vncw review will need to be completed once pt is medically ready for discharge.       Laura Jamil RN, BSN, PHN  [facility]  Inpatient Care Management - FLOAT  ICU CM RN Mobile: [phone] daily 7:30-4:00      "

## 2022-12-06 NOTE — IR NOTE
Patient Name: Blanco Osborne  Medical Record Number: [id number]  Today's Date: 12/6/2022    Procedure: CVC tunneled Placement  Proceduralist: Dr. Workman  Pathology present: no    Procedure Start: 1102  Procedure end: 1115  Sedation medications administered: Last Dose: 1042, Fentanyl 25 mcg, Versed 0.5 mg, Lidocaine 13 ml's.    Report given to: PRAVEEN Cha RN  : no    Other Notes: Pt will require 1 bedrest post procedure. Pt arrived to IR room 2 from 370. Consent reviewed. Pt denies any questions or concerns regarding procedure. Pt positioned supine and monitored per protocol. Pt tolerated procedure without any noted complications.

## 2022-12-07 ENCOUNTER — APPOINTMENT (OUTPATIENT)
Dept: GENERAL RADIOLOGY | Facility: CLINIC | Age: 57
DRG: 004 | End: 2022-12-07
Attending: INTERNAL MEDICINE
Payer: COMMERCIAL

## 2022-12-07 ENCOUNTER — APPOINTMENT (OUTPATIENT)
Dept: OCCUPATIONAL THERAPY | Facility: CLINIC | Age: 57
DRG: 004 | End: 2022-12-07
Attending: INTERNAL MEDICINE
Payer: COMMERCIAL

## 2022-12-07 LAB
ALBUMIN SERPL-MCNC: 1.9 G/DL (ref 3.4–5)
ANION GAP SERPL CALCULATED.3IONS-SCNC: 10 MMOL/L (ref 3–14)
ATRIAL RATE - MUSE: 114 BPM
BUN SERPL-MCNC: 57 MG/DL (ref 7–30)
CALCIUM SERPL-MCNC: 8.2 MG/DL (ref 8.5–10.1)
CHLORIDE BLD-SCNC: 101 MMOL/L (ref 94–109)
CO2 SERPL-SCNC: 27 MMOL/L (ref 20–32)
CREAT SERPL-MCNC: 2.51 MG/DL (ref 0.66–1.25)
DIASTOLIC BLOOD PRESSURE - MUSE: NORMAL MMHG
ERYTHROCYTE [DISTWIDTH] IN BLOOD BY AUTOMATED COUNT: 18.6 % (ref 10–15)
GFR SERPL CREATININE-BSD FRML MDRD: 29 ML/MIN/1.73M2
GLUCOSE BLD-MCNC: 190 MG/DL (ref 70–99)
GLUCOSE BLDC GLUCOMTR-MCNC: 158 MG/DL (ref 70–99)
GLUCOSE BLDC GLUCOMTR-MCNC: 183 MG/DL (ref 70–99)
GLUCOSE BLDC GLUCOMTR-MCNC: 192 MG/DL (ref 70–99)
GLUCOSE BLDC GLUCOMTR-MCNC: 213 MG/DL (ref 70–99)
GLUCOSE BLDC GLUCOMTR-MCNC: 214 MG/DL (ref 70–99)
GLUCOSE BLDC GLUCOMTR-MCNC: 218 MG/DL (ref 70–99)
GLUCOSE BLDC GLUCOMTR-MCNC: 221 MG/DL (ref 70–99)
HCT VFR BLD AUTO: 26 % (ref 40–53)
HGB BLD-MCNC: 8 G/DL (ref 13.3–17.7)
INTERPRETATION ECG - MUSE: NORMAL
MCH RBC QN AUTO: 34.6 PG (ref 26.5–33)
MCHC RBC AUTO-ENTMCNC: 30.8 G/DL (ref 31.5–36.5)
MCV RBC AUTO: 113 FL (ref 78–100)
P AXIS - MUSE: 48 DEGREES
PHOSPHATE SERPL-MCNC: 2.2 MG/DL (ref 2.5–4.5)
PLATELET # BLD AUTO: 53 10E3/UL (ref 150–450)
POTASSIUM BLD-SCNC: 3.6 MMOL/L (ref 3.4–5.3)
PR INTERVAL - MUSE: 148 MS
QRS DURATION - MUSE: 80 MS
QT - MUSE: 294 MS
QTC - MUSE: 405 MS
R AXIS - MUSE: 3 DEGREES
RBC # BLD AUTO: 2.31 10E6/UL (ref 4.4–5.9)
SODIUM SERPL-SCNC: 138 MMOL/L (ref 133–144)
SYSTOLIC BLOOD PRESSURE - MUSE: NORMAL MMHG
T AXIS - MUSE: 37 DEGREES
TACROLIMUS BLD-MCNC: 3.1 UG/L (ref 5–15)
TME LAST DOSE: ABNORMAL H
TME LAST DOSE: ABNORMAL H
VENTRICULAR RATE- MUSE: 114 BPM
WBC # BLD AUTO: 5 10E3/UL (ref 4–11)

## 2022-12-07 PROCEDURE — 250N000011 HC RX IP 250 OP 636: Performed by: INTERNAL MEDICINE

## 2022-12-07 PROCEDURE — 250N000013 HC RX MED GY IP 250 OP 250 PS 637: Performed by: INTERNAL MEDICINE

## 2022-12-07 PROCEDURE — 97530 THERAPEUTIC ACTIVITIES: CPT | Mod: GO

## 2022-12-07 PROCEDURE — 97167 OT EVAL HIGH COMPLEX 60 MIN: CPT | Mod: GO

## 2022-12-07 PROCEDURE — 80069 RENAL FUNCTION PANEL: CPT | Performed by: INTERNAL MEDICINE

## 2022-12-07 PROCEDURE — 999N000157 HC STATISTIC RCP TIME EA 10 MIN

## 2022-12-07 PROCEDURE — 94640 AIRWAY INHALATION TREATMENT: CPT

## 2022-12-07 PROCEDURE — 71045 X-RAY EXAM CHEST 1 VIEW: CPT

## 2022-12-07 PROCEDURE — 94640 AIRWAY INHALATION TREATMENT: CPT | Mod: 76

## 2022-12-07 PROCEDURE — 250N000013 HC RX MED GY IP 250 OP 250 PS 637: Performed by: STUDENT IN AN ORGANIZED HEALTH CARE EDUCATION/TRAINING PROGRAM

## 2022-12-07 PROCEDURE — 250N000013 HC RX MED GY IP 250 OP 250 PS 637: Performed by: PHYSICIAN ASSISTANT

## 2022-12-07 PROCEDURE — 250N000009 HC RX 250: Performed by: INTERNAL MEDICINE

## 2022-12-07 PROCEDURE — 250N000009 HC RX 250: Performed by: NURSE PRACTITIONER

## 2022-12-07 PROCEDURE — 99232 SBSQ HOSP IP/OBS MODERATE 35: CPT | Performed by: INTERNAL MEDICINE

## 2022-12-07 PROCEDURE — 85027 COMPLETE CBC AUTOMATED: CPT | Performed by: INTERNAL MEDICINE

## 2022-12-07 PROCEDURE — 80197 ASSAY OF TACROLIMUS: CPT | Performed by: STUDENT IN AN ORGANIZED HEALTH CARE EDUCATION/TRAINING PROGRAM

## 2022-12-07 PROCEDURE — 94003 VENT MGMT INPAT SUBQ DAY: CPT

## 2022-12-07 PROCEDURE — 99291 CRITICAL CARE FIRST HOUR: CPT | Performed by: INTERNAL MEDICINE

## 2022-12-07 PROCEDURE — 250N000012 HC RX MED GY IP 250 OP 636 PS 637: Performed by: NURSE PRACTITIONER

## 2022-12-07 PROCEDURE — 250N000009 HC RX 250: Performed by: PHYSICIAN ASSISTANT

## 2022-12-07 PROCEDURE — 94799 UNLISTED PULMONARY SVC/PX: CPT

## 2022-12-07 PROCEDURE — 200N000001 HC R&B ICU

## 2022-12-07 RX ADMIN — LACTULOSE 10 G: 20 SOLUTION ORAL at 22:18

## 2022-12-07 RX ADMIN — IPRATROPIUM BROMIDE AND ALBUTEROL SULFATE 3 ML: .5; 3 SOLUTION RESPIRATORY (INHALATION) at 08:14

## 2022-12-07 RX ADMIN — ACETYLCYSTEINE 2 ML: 200 SOLUTION ORAL; RESPIRATORY (INHALATION) at 12:13

## 2022-12-07 RX ADMIN — HYDROCORTISONE SODIUM SUCCINATE 25 MG: 100 INJECTION, POWDER, FOR SOLUTION INTRAMUSCULAR; INTRAVENOUS at 07:44

## 2022-12-07 RX ADMIN — OXYCODONE HYDROCHLORIDE 5 MG: 5 TABLET ORAL at 00:38

## 2022-12-07 RX ADMIN — ACYCLOVIR: 50 OINTMENT TOPICAL at 06:20

## 2022-12-07 RX ADMIN — INSULIN ASPART 3 UNITS: 100 INJECTION, SOLUTION INTRAVENOUS; SUBCUTANEOUS at 04:55

## 2022-12-07 RX ADMIN — ACETYLCYSTEINE 2 ML: 200 SOLUTION ORAL; RESPIRATORY (INHALATION) at 19:37

## 2022-12-07 RX ADMIN — IPRATROPIUM BROMIDE AND ALBUTEROL SULFATE 3 ML: .5; 3 SOLUTION RESPIRATORY (INHALATION) at 12:13

## 2022-12-07 RX ADMIN — CHLORHEXIDINE GLUCONATE 0.12% ORAL RINSE 15 ML: 1.2 LIQUID ORAL at 07:44

## 2022-12-07 RX ADMIN — SENNOSIDES AND DOCUSATE SODIUM 1 TABLET: 50; 8.6 TABLET ORAL at 08:06

## 2022-12-07 RX ADMIN — HYDROCORTISONE SODIUM SUCCINATE 25 MG: 100 INJECTION, POWDER, FOR SOLUTION INTRAMUSCULAR; INTRAVENOUS at 20:32

## 2022-12-07 RX ADMIN — FOLIC ACID 1 MG: 5 INJECTION, SOLUTION INTRAMUSCULAR; INTRAVENOUS; SUBCUTANEOUS at 08:07

## 2022-12-07 RX ADMIN — INSULIN ASPART 2 UNITS: 100 INJECTION, SOLUTION INTRAVENOUS; SUBCUTANEOUS at 23:50

## 2022-12-07 RX ADMIN — APIXABAN 5 MG: 5 TABLET, FILM COATED ORAL at 08:07

## 2022-12-07 RX ADMIN — ACETYLCYSTEINE 2 ML: 200 SOLUTION ORAL; RESPIRATORY (INHALATION) at 08:14

## 2022-12-07 RX ADMIN — LACTULOSE 10 G: 20 SOLUTION ORAL at 08:07

## 2022-12-07 RX ADMIN — ACYCLOVIR: 50 OINTMENT TOPICAL at 22:19

## 2022-12-07 RX ADMIN — ACYCLOVIR: 50 OINTMENT TOPICAL at 14:45

## 2022-12-07 RX ADMIN — ACETYLCYSTEINE 2 ML: 200 SOLUTION ORAL; RESPIRATORY (INHALATION) at 15:19

## 2022-12-07 RX ADMIN — APIXABAN 5 MG: 5 TABLET, FILM COATED ORAL at 20:33

## 2022-12-07 RX ADMIN — INSULIN ASPART 3 UNITS: 100 INJECTION, SOLUTION INTRAVENOUS; SUBCUTANEOUS at 07:47

## 2022-12-07 RX ADMIN — TACROLIMUS 1 MG: 5 CAPSULE ORAL at 20:33

## 2022-12-07 RX ADMIN — CHLORHEXIDINE GLUCONATE 0.12% ORAL RINSE 15 ML: 1.2 LIQUID ORAL at 20:33

## 2022-12-07 RX ADMIN — INSULIN ASPART 3 UNITS: 100 INJECTION, SOLUTION INTRAVENOUS; SUBCUTANEOUS at 12:00

## 2022-12-07 RX ADMIN — Medication 1 DROP: at 22:18

## 2022-12-07 RX ADMIN — POLYETHYLENE GLYCOL 3350 17 G: 17 POWDER, FOR SOLUTION ORAL at 08:07

## 2022-12-07 RX ADMIN — HYDROCORTISONE SODIUM SUCCINATE 25 MG: 100 INJECTION, POWDER, FOR SOLUTION INTRAMUSCULAR; INTRAVENOUS at 14:34

## 2022-12-07 RX ADMIN — INSULIN ASPART 1 UNITS: 100 INJECTION, SOLUTION INTRAVENOUS; SUBCUTANEOUS at 16:42

## 2022-12-07 RX ADMIN — Medication 15 ML: at 08:07

## 2022-12-07 RX ADMIN — INSULIN ASPART 4 UNITS: 100 INJECTION, SOLUTION INTRAVENOUS; SUBCUTANEOUS at 20:45

## 2022-12-07 RX ADMIN — TACROLIMUS 1 MG: 5 CAPSULE ORAL at 08:07

## 2022-12-07 RX ADMIN — INSULIN ASPART 3 UNITS: 100 INJECTION, SOLUTION INTRAVENOUS; SUBCUTANEOUS at 00:27

## 2022-12-07 RX ADMIN — Medication 40 MG: at 07:44

## 2022-12-07 RX ADMIN — IPRATROPIUM BROMIDE AND ALBUTEROL SULFATE 3 ML: .5; 3 SOLUTION RESPIRATORY (INHALATION) at 15:19

## 2022-12-07 RX ADMIN — HYDROCORTISONE SODIUM SUCCINATE 25 MG: 100 INJECTION, POWDER, FOR SOLUTION INTRAMUSCULAR; INTRAVENOUS at 02:18

## 2022-12-07 RX ADMIN — OXYCODONE HYDROCHLORIDE 5 MG: 5 TABLET ORAL at 23:49

## 2022-12-07 RX ADMIN — IPRATROPIUM BROMIDE AND ALBUTEROL SULFATE 3 ML: .5; 3 SOLUTION RESPIRATORY (INHALATION) at 19:37

## 2022-12-07 RX ADMIN — LACTULOSE 10 G: 20 SOLUTION ORAL at 16:50

## 2022-12-07 ASSESSMENT — ACTIVITIES OF DAILY LIVING (ADL)
ADLS_ACUITY_SCORE: 42
ADLS_ACUITY_SCORE: 40
ADLS_ACUITY_SCORE: 42
ADLS_ACUITY_SCORE: 40
ADLS_ACUITY_SCORE: 36
ADLS_ACUITY_SCORE: 38
ADLS_ACUITY_SCORE: 36
ADLS_ACUITY_SCORE: 40
ADLS_ACUITY_SCORE: 42
ADLS_ACUITY_SCORE: 42
ADLS_ACUITY_SCORE: 36
ADLS_ACUITY_SCORE: 38

## 2022-12-07 NOTE — PROGRESS NOTES
[facility] ICU RESPIRATORY NOTE        Date of Admission: 11/12/2022    Date of Intubation (most recent): 11/26/2022    Reason for Mechanical Ventilation: Respiratory failure    Number of Days on Mechanical Ventilation: 12    Significant Events Today: Patient on full support overnight. No other issues.    ABG Results:   Recent Labs   Lab 12/05/22 2251 11/30/22  0955   O2PER 40 30       Current Vent Settings: Vent Mode: CMV/AC  (Continuous Mandatory Ventilation/ Assist Control)  FiO2 (%): 30 %  Resp Rate (Set): 16 breaths/min  Tidal Volume (Set, mL): 450 mL  PEEP (cm H2O): 7 cmH2O  Resp: 17        Jayson Marie, RT on 12/7/2022 at 5:32 AM

## 2022-12-07 NOTE — PROGRESS NOTES
Care Management Follow Up    Length of Stay (days): 25  Expected Discharge Date: 12/08/2022  Concerns to be Addressed: discharge planning - need insurance auth & peer to peer then ride arranged   Patient plan of care discussed at interdisciplinary rounds: Yes  Anticipated Discharge Disposition: LTACH  Anticipated Discharge Services: Transportation - stretcher/ventilator   Anticipated Discharge DME: Other (see comment) (TBD)  Patient/family educated on Medicare website which has current facility and service quality ratings: yes  Education Provided on the Discharge Plan:    Patient/Family in Agreement with the Plan: yes  Referrals Placed by CM/SW: Post Acute Facilities  Private pay costs discussed: Not applicable    Additional Information:  Spoke with CHRISTOPHER Auguste liaison for [place] [facility] [phone].  She will review to see if pt appropriate to come today, from LTACH standpoint.     Anticipate MD [id number] review will need to be completed once pt is medically ready for discharge. Dr. Jones aware as of 12/6.    ADDENDUM:   LTACH liaison has reviewed notes; they do need to re-submit the insurance authorization.  Liaison requests documentation of current weaning trial from [facility] (last documented on 12/5);  then they can submit for insurance auth, followed by [id number].  ADITYA-RN notifying bedside ICU RN.      Laura Jamil RN, BSN, PHN  [facility]  Inpatient Care Management - FLOAT  ICU CM RN Mobile: [phone] daily 7:30-4:00

## 2022-12-07 NOTE — PROGRESS NOTES
12/07/22 1528   Appointment Info   Signing Clinician's Name / Credentials (OT) Alejandrina Horton   Living Environment   People in Home spouse   Current Living Arrangements house   Living Environment Comments From chart review, pt resides at home w/ spouse. Unable to obtain full social hx as difficult to understanding pt d/t soft voice and vent/trach   Self-Care   Current Activity Tolerance poor   Activity/Exercise/Self-Care Comment Per chart review, independent w/ all ADLs/IADLs at baseline, works from home.   General Information   Onset of Illness/Injury or Date of Surgery 11/12/22   Referring Physician Rainer Jones MD   Patient/Family Therapy Goal Statement (OT) unable to obtain   Additional Occupational Profile Info/Pertinent History of Current Problem Blanco Osborne is a 57-year-old male with PMH CARRILLO s/p liver transplant (9/2016) and cirrhosis admitted 11/12/2022 with out of hospital PEA arrest and acute hypoxemic respiratory failure. Course complicated by parainfluenza virus, streptococcal bacteremia, and LORETTA requiring CRRT.   Existing Precautions/Restrictions fall;oxygen therapy device and L/min   Cognitive Status Examination   Orientation Status person   Follows Commands follows one-step commands;25-49% accuracy;initiation impaired;increased processing time needed   Cognitive Status Comments Unable to vocalize d/t trach. shakes head yes/no. Appears accurate expect when asked if he can move a certain body part, he will answer yes but then not initiate miovement.   Visual Perception   Visual Impairment/Limitations unable/difficult to assess   Range of Motion Comprehensive   General Range of Motion upper extremity range of motion deficits identified   Comment, General Range of Motion Able to shrug shlds, 1/5 shld, 1/5 elbow flex/ext, able to  but weak   Strength Comprehensive (MMT)   General Manual Muscle Testing (MMT) Assessment upper extremity strength deficits identified   Comment,  General Manual Muscle Testing (MMT) Assessment B UE severely deconditioned   Coordination   Upper Extremity Coordination Left UE impaired;Right UE impaired   Functional Limitations Decreased speed;Fine motor ADL performance impaired;Impaired ability to perform bilateral tasks;Object transport impaired;Reach to targets impaired  (d/t weakness)   Bed Mobility   Bed Mobility rolling left;rolling right   Rolling Left [place] (Bed Mobility) dependent (less than 25% patient effort);2 person assist   Transfers   Transfers other (see comments)  (NT but nursing just got pt back to bed with lift)   Clinical Impression   Criteria for Skilled Therapeutic Interventions Met (OT) Yes, treatment indicated   OT Diagnosis decreased functional mobility and self care independence   OT Problem List-Impairments impacting ADL problems related to;activity tolerance impaired;cognition;mobility;coordination;range of motion (ROM);strength;postural control;motor control   Assessment of Occupational Performance 5 or more Performance Deficits   Identified Performance Deficits dressing, bathing, toileting, IADLs, functional mobility   Planned Therapy Interventions (OT) ADL retraining;balance training;strengthening;transfer training;progressive activity/exercise;neuromuscular re-education;ROM;stretching;fine motor coordination training   Clinical Decision Making Complexity (OT) high complexity   Risk & Benefits of therapy have been explained evaluation/treatment results reviewed;care plan/treatment goals reviewed   OT Total Evaluation Time   OT Eval, High Complexity Minutes (71595) 10   OT Goals   Therapy Frequency (OT) 5 times/wk   OT Predicted Duration/Target Date for Goal Attainment 12/14/22   OT: Hygiene/Grooming moderate assist   OT: Lower Body Dressing Maximum assist   OT: Transfer Maximum assist   OT: Goal 1 Pt will participate in 10-12 mins of UE ex to increase ROM, strength, coordination in prep for ADLs   Therapeutic Activities    Therapeutic Activity Minutes (22291) 15   Symptoms noted during/after treatment fatigue   Treatment Detail/Skilled Intervention Supine in bed, agrees to OT with head nod. Alert throughout session. Able to mouth wife's name when asked. Does not attempt to mouth any other words. Pt nods head yes whn asked to complete a movement but then not able to move (motor planning, strength ,cognition?) Able to actively lift R hand approx 6 inches off pillow. Able to shrug shlds. AAROM in fingers, wrist, elbow flex/ext, IR/ER. Attempted hip flex/knee flex with leg to assess kick, pt unable to straighten when supported.   OT Discharge Planning   OT Plan Co-treat with PT? to assess sitting balance with sligh placement. AAROM B UE/LE, assess core stability   OT Discharge Recommendation (DC Rec) LTACH-pending meets medical criteria;Transitional Care Facility   OT Rationale for DC Rec Pt presents significantly below baseline for ADL/IADL performance. Independent and works at baseline. Limited by prolonged ICU/hospital stay. D/c recs from medical team is to LTAC to maximize independence, strength and safety.   Total Session Time   Timed Code Treatment Minutes 15   Total Session Time (sum of timed and untimed services) 25

## 2022-12-07 NOTE — PLAN OF CARE
Neuro: Alert, JALYN orientation 2/2 trach. Answers Y/N questions. Flat affect, calm. Cooperative. SOTO. PERRLA.  CV: Soft BP's at times. BPs more stable after HD. Tele SR/ST. 1-2+ edema generally.   Respiratory: LS coarse. Full vent support. Secretions oozing around trach.  GI/: Anuric, on HD. Rectal tube patent. Minimal stooling.  Skin: Scattered bruising. Perineal redness, barrier applied.  Activity: BR, Lift.  Diet: TF at goal of 55ml/hr.   Drips: None.   Other: Tunneled cath placed 12/6. HD run 12/6. 2L off.  Plan: Discharge to LTACH Pending.

## 2022-12-07 NOTE — PROGRESS NOTES
Pt has been on HFTD 30L 40% t/o the day.  Nebs and Volera done as ordered.  Will place pt back on CMV overnight to rest.  Will continue to follow  Cheng Douglass, RT  12/7/2022

## 2022-12-07 NOTE — PROGRESS NOTES
Renal Medicine Progress Note            Assessment/Plan:     Blanco Osborne is a 57-year-old male with PMH CARRILLO s/p liver transplant (9/2016) and cirrhosis admitted 11/12/2022 with out of hospital PEA arrest and acute hypoxemic respiratory failure. Course complicated by parainfluenza virus, streptococcal bacteremia, and LORETTA requiring CRRT.         1.  Severe anuric LORETTA:               -CRRT stopped 11/22 and resume on 11/24 and stopped 11/26.               -IHD started 11/29/30. This week ran Sunday 12/4 and yesterday.                 2.  s/p Septic shock with MODS:                -no current pressors                  3.  Respiratory failure: current 30%  FIo2              -multiple re intubations               -R Pneumothorax               -Aspergillus PNA             4.  ESLD due to CARRILLO s/p liver transplant                - tacrolimus 1mg BID     5.  Hypoalbuminemia      6. Anemia     Plan:  1) Plan next dialysis tomorrow 12/8, continue ultrafiltration as able. No urgency for additional run today  2) No renal barriers to discharge to LTACH    Torsten Montaño DO  [facility] consultants  Office: [phone]  Cell: [phone]        Interval History:      Pt s/p HD yesterday following IR tunneled catheter replacement, 2kg volume removed. Had thoraccentesis with 200cc removed. Weight on declining trend, lowest in past week today. Spouse at bedside. Hemodynamically stable.            Medications and Allergies:       - MEDICATION INSTRUCTIONS for Dialysis Patients -   Does not apply See Admin Instructions     acetylcysteine  2 mL Nebulization 4x Daily     acyclovir   Topical Q8H     apixaban ANTICOAGULANT  5 mg Oral or Feeding Tube BID     artificial tears  1 drop Both Eyes TID     chlorhexidine  15 mL Mouth/Throat Q12H     folic acid  1 mg Intravenous Daily     heparin lock flush  5-20 mL Intracatheter Q24H     hydrocortisone sodium succinate PF  25 mg Intravenous Q6H     insulin aspart  1-12 Units Subcutaneous Q4H      insulin glargine  24 Units Subcutaneous QAM AC     ipratropium - albuterol 0.5 mg/2.5 mg/3 mL  3 mL Nebulization 4x daily     lactulose  10 g Oral or Feeding Tube TID     multivitamins w/minerals  15 mL Per Feeding Tube Daily     pantoprazole  40 mg Per Feeding Tube QAM AC    Or     pantoprazole  40 mg Intravenous QAM AC     polyethylene glycol  17 g Oral or Feeding Tube Daily     senna-docusate  1 tablet Oral or Feeding Tube BID    Or     senna-docusate  2 tablet Oral or Feeding Tube BID     sodium chloride (PF)  10-40 mL Intracatheter Q8H     tacrolimus  1 mg Oral or Feeding Tube BID      No Known Allergies         Physical Exam:   Vitals were reviewed  /73   Pulse 101   Temp 98  F (36.7  C) (Oral)   Resp 25   Ht 1.829 m (6')   Wt 83.3 kg (183 lb 10.3 oz)   SpO2 99%   BMI 24.91 kg/m      Wt Readings from Last 3 Encounters:   12/07/22 83.3 kg (183 lb 10.3 oz)   10/07/19 137.5 kg (303 lb 3.2 oz)   10/04/19 131.5 kg (290 lb)       Intake/Output Summary (Last 24 hours) at 12/7/2022 1029  Last data filed at 12/7/2022 1000  Gross per 24 hour   Intake 880 ml   Output 2000 ml   Net -1120 ml       GENERAL APPEARANCE: frail, alert and awake, able to follow commands  HEENT:  Trach present  RESP: lungs coarse b/l  CV: RRR, nl S1/S2   ABDOMEN: o/s/nt/nd, bs present  EXTREMITIES/SKIN: no c/c/rashes/lesions; no edema             Data:     BMP  Recent Labs   Lab 12/07/22  0746 12/07/22  0454 12/07/22  0448 12/07/22  0024 12/06/22  0520 12/06/22  0513 12/04/22  0952 12/04/22  0941 12/03/22  0751 12/03/22  0513   NA  --   --  138  --   --  136  --  138  --  137   POTASSIUM  --   --  3.6  --   --  3.8  --  3.7  --  3.8   CHLORIDE  --   --  101  --   --  98  --  102  --  102   KELLY  --   --  8.2*  --   --  7.9*  --  7.9*  --  7.7*   CO2  --   --  27  --   --  25  --  22  --  25   BUN  --   --  57*  --   --  95*  --  107*  --  75*   CR  --   --  2.51*  --   --  3.80*  --  4.04*  --  3.14*   * 192* 190* 214*   <  > 178*   < > 234*   < > 210*    < > = values in this interval not displayed.     CBC  Recent Labs   Lab 12/07/22  0448 12/06/22  1825 12/06/22  1448 12/06/22  0513   WBC 5.0 4.9 3.3* 8.0   HGB 8.0* 8.4* 7.1* 7.8*   HCT 26.0* 27.2* 23.2* 25.5*   * 110* 112* 113*   PLT 53* 59* 43* 76*     Lab Results   Component Value Date    AST 13 12/03/2022    ALT 25 12/03/2022    ALKPHOS 240 (H) 12/03/2022    BILITOTAL 1.0 12/03/2022    CHANDLER 76 (H) 11/30/2022     Lab Results   Component Value Date    INR 1.16 (H) 12/06/2022       Attestation:  I have reviewed today's vital signs, notes, medications, labs and imaging.    DO [facility] - Nephrology  Office: [phone]  Cell: [phone]

## 2022-12-08 ENCOUNTER — APPOINTMENT (OUTPATIENT)
Dept: PHYSICAL THERAPY | Facility: CLINIC | Age: 57
DRG: 004 | End: 2022-12-08
Attending: INTERNAL MEDICINE
Payer: COMMERCIAL

## 2022-12-08 LAB
ALBUMIN SERPL-MCNC: 1.9 G/DL (ref 3.4–5)
ANION GAP SERPL CALCULATED.3IONS-SCNC: 8 MMOL/L (ref 3–14)
BACTERIA FLD CULT: NORMAL
BUN SERPL-MCNC: 84 MG/DL (ref 7–30)
CALCIUM SERPL-MCNC: 7.8 MG/DL (ref 8.5–10.1)
CHLORIDE BLD-SCNC: 100 MMOL/L (ref 94–109)
CO2 SERPL-SCNC: 29 MMOL/L (ref 20–32)
CREAT SERPL-MCNC: 3.51 MG/DL (ref 0.66–1.25)
ERYTHROCYTE [DISTWIDTH] IN BLOOD BY AUTOMATED COUNT: 18.7 % (ref 10–15)
ERYTHROCYTE [DISTWIDTH] IN BLOOD BY AUTOMATED COUNT: 18.7 % (ref 10–15)
GFR SERPL CREATININE-BSD FRML MDRD: 19 ML/MIN/1.73M2
GLUCOSE BLD-MCNC: 183 MG/DL (ref 70–99)
GLUCOSE BLDC GLUCOMTR-MCNC: 152 MG/DL (ref 70–99)
GLUCOSE BLDC GLUCOMTR-MCNC: 172 MG/DL (ref 70–99)
GLUCOSE BLDC GLUCOMTR-MCNC: 202 MG/DL (ref 70–99)
GLUCOSE BLDC GLUCOMTR-MCNC: 214 MG/DL (ref 70–99)
GLUCOSE BLDC GLUCOMTR-MCNC: 218 MG/DL (ref 70–99)
HCT VFR BLD AUTO: 26.1 % (ref 40–53)
HCT VFR BLD AUTO: 26.2 % (ref 40–53)
HGB BLD-MCNC: 7.8 G/DL (ref 13.3–17.7)
HGB BLD-MCNC: 7.9 G/DL (ref 13.3–17.7)
MCH RBC QN AUTO: 34.2 PG (ref 26.5–33)
MCH RBC QN AUTO: 34.5 PG (ref 26.5–33)
MCHC RBC AUTO-ENTMCNC: 29.8 G/DL (ref 31.5–36.5)
MCHC RBC AUTO-ENTMCNC: 30.3 G/DL (ref 31.5–36.5)
MCV RBC AUTO: 114 FL (ref 78–100)
MCV RBC AUTO: 115 FL (ref 78–100)
PHOSPHATE SERPL-MCNC: 3.3 MG/DL (ref 2.5–4.5)
PLATELET # BLD AUTO: 49 10E3/UL (ref 150–450)
PLATELET # BLD AUTO: 60 10E3/UL (ref 150–450)
POTASSIUM BLD-SCNC: 3.9 MMOL/L (ref 3.4–5.3)
RBC # BLD AUTO: 2.28 10E6/UL (ref 4.4–5.9)
RBC # BLD AUTO: 2.29 10E6/UL (ref 4.4–5.9)
SODIUM SERPL-SCNC: 137 MMOL/L (ref 133–144)
TACROLIMUS BLD-MCNC: 4.5 UG/L (ref 5–15)
TME LAST DOSE: ABNORMAL H
TME LAST DOSE: ABNORMAL H
WBC # BLD AUTO: 4.6 10E3/UL (ref 4–11)
WBC # BLD AUTO: 6.1 10E3/UL (ref 4–11)

## 2022-12-08 PROCEDURE — 250N000013 HC RX MED GY IP 250 OP 250 PS 637: Performed by: PHYSICIAN ASSISTANT

## 2022-12-08 PROCEDURE — 80069 RENAL FUNCTION PANEL: CPT | Performed by: INTERNAL MEDICINE

## 2022-12-08 PROCEDURE — 90935 HEMODIALYSIS ONE EVALUATION: CPT | Performed by: INTERNAL MEDICINE

## 2022-12-08 PROCEDURE — 250N000011 HC RX IP 250 OP 636: Performed by: INTERNAL MEDICINE

## 2022-12-08 PROCEDURE — 250N000009 HC RX 250: Performed by: INTERNAL MEDICINE

## 2022-12-08 PROCEDURE — 90937 HEMODIALYSIS REPEATED EVAL: CPT

## 2022-12-08 PROCEDURE — 97112 NEUROMUSCULAR REEDUCATION: CPT | Mod: GP

## 2022-12-08 PROCEDURE — 94640 AIRWAY INHALATION TREATMENT: CPT

## 2022-12-08 PROCEDURE — 200N000001 HC R&B ICU

## 2022-12-08 PROCEDURE — 94003 VENT MGMT INPAT SUBQ DAY: CPT

## 2022-12-08 PROCEDURE — 999N000040 HC STATISTIC CONSULT NO CHARGE VASC ACCESS

## 2022-12-08 PROCEDURE — 97164 PT RE-EVAL EST PLAN CARE: CPT | Mod: GP

## 2022-12-08 PROCEDURE — 999N000157 HC STATISTIC RCP TIME EA 10 MIN

## 2022-12-08 PROCEDURE — 97110 THERAPEUTIC EXERCISES: CPT | Mod: GP

## 2022-12-08 PROCEDURE — 97530 THERAPEUTIC ACTIVITIES: CPT | Mod: GP

## 2022-12-08 PROCEDURE — 94640 AIRWAY INHALATION TREATMENT: CPT | Mod: 76

## 2022-12-08 PROCEDURE — 250N000013 HC RX MED GY IP 250 OP 250 PS 637: Performed by: STUDENT IN AN ORGANIZED HEALTH CARE EDUCATION/TRAINING PROGRAM

## 2022-12-08 PROCEDURE — 999N000190 HC STATISTIC VAT ROUNDS

## 2022-12-08 PROCEDURE — 250N000009 HC RX 250: Performed by: NURSE PRACTITIONER

## 2022-12-08 PROCEDURE — 85027 COMPLETE CBC AUTOMATED: CPT | Performed by: INTERNAL MEDICINE

## 2022-12-08 PROCEDURE — 250N000012 HC RX MED GY IP 250 OP 636 PS 637: Performed by: NURSE PRACTITIONER

## 2022-12-08 PROCEDURE — 250N000009 HC RX 250: Performed by: PHYSICIAN ASSISTANT

## 2022-12-08 PROCEDURE — 94668 MNPJ CHEST WALL SBSQ: CPT

## 2022-12-08 PROCEDURE — 99291 CRITICAL CARE FIRST HOUR: CPT | Performed by: INTERNAL MEDICINE

## 2022-12-08 PROCEDURE — 80197 ASSAY OF TACROLIMUS: CPT | Performed by: INTERNAL MEDICINE

## 2022-12-08 PROCEDURE — 94799 UNLISTED PULMONARY SVC/PX: CPT

## 2022-12-08 RX ORDER — POLYETHYLENE GLYCOL 3350 17 G/17G
17 POWDER, FOR SOLUTION ORAL DAILY PRN
Status: DISCONTINUED | OUTPATIENT
Start: 2022-12-08 | End: 2022-12-15 | Stop reason: HOSPADM

## 2022-12-08 RX ADMIN — LACTULOSE 10 G: 20 SOLUTION ORAL at 16:34

## 2022-12-08 RX ADMIN — CHLORHEXIDINE GLUCONATE 0.12% ORAL RINSE 15 ML: 1.2 LIQUID ORAL at 10:03

## 2022-12-08 RX ADMIN — Medication 1 DROP: at 10:03

## 2022-12-08 RX ADMIN — APIXABAN 5 MG: 5 TABLET, FILM COATED ORAL at 21:00

## 2022-12-08 RX ADMIN — INSULIN ASPART 1 UNITS: 100 INJECTION, SOLUTION INTRAVENOUS; SUBCUTANEOUS at 08:15

## 2022-12-08 RX ADMIN — ACETYLCYSTEINE 2 ML: 200 SOLUTION ORAL; RESPIRATORY (INHALATION) at 11:50

## 2022-12-08 RX ADMIN — HYDROCORTISONE SODIUM SUCCINATE 25 MG: 100 INJECTION, POWDER, FOR SOLUTION INTRAMUSCULAR; INTRAVENOUS at 13:24

## 2022-12-08 RX ADMIN — ACYCLOVIR: 50 OINTMENT TOPICAL at 21:00

## 2022-12-08 RX ADMIN — Medication 0.03 MCG/KG/MIN: at 08:11

## 2022-12-08 RX ADMIN — Medication 15 ML: at 10:03

## 2022-12-08 RX ADMIN — ACYCLOVIR: 50 OINTMENT TOPICAL at 13:27

## 2022-12-08 RX ADMIN — Medication 40 MG: at 10:03

## 2022-12-08 RX ADMIN — FOLIC ACID 1 MG: 5 INJECTION, SOLUTION INTRAMUSCULAR; INTRAVENOUS; SUBCUTANEOUS at 10:07

## 2022-12-08 RX ADMIN — APIXABAN 5 MG: 5 TABLET, FILM COATED ORAL at 10:03

## 2022-12-08 RX ADMIN — IPRATROPIUM BROMIDE AND ALBUTEROL SULFATE 3 ML: .5; 3 SOLUTION RESPIRATORY (INHALATION) at 11:51

## 2022-12-08 RX ADMIN — IPRATROPIUM BROMIDE AND ALBUTEROL SULFATE 3 ML: .5; 3 SOLUTION RESPIRATORY (INHALATION) at 15:49

## 2022-12-08 RX ADMIN — TACROLIMUS 1 MG: 5 CAPSULE ORAL at 21:20

## 2022-12-08 RX ADMIN — HYDROCORTISONE SODIUM SUCCINATE 25 MG: 100 INJECTION, POWDER, FOR SOLUTION INTRAMUSCULAR; INTRAVENOUS at 11:33

## 2022-12-08 RX ADMIN — INSULIN ASPART 4 UNITS: 100 INJECTION, SOLUTION INTRAVENOUS; SUBCUTANEOUS at 11:40

## 2022-12-08 RX ADMIN — IPRATROPIUM BROMIDE AND ALBUTEROL SULFATE 3 ML: .5; 3 SOLUTION RESPIRATORY (INHALATION) at 07:16

## 2022-12-08 RX ADMIN — CHLORHEXIDINE GLUCONATE 0.12% ORAL RINSE 15 ML: 1.2 LIQUID ORAL at 21:00

## 2022-12-08 RX ADMIN — IPRATROPIUM BROMIDE AND ALBUTEROL SULFATE 3 ML: .5; 3 SOLUTION RESPIRATORY (INHALATION) at 19:00

## 2022-12-08 RX ADMIN — Medication 0.03 MCG/KG/MIN: at 13:24

## 2022-12-08 RX ADMIN — LACTULOSE 10 G: 20 SOLUTION ORAL at 21:00

## 2022-12-08 RX ADMIN — INSULIN ASPART 3 UNITS: 100 INJECTION, SOLUTION INTRAVENOUS; SUBCUTANEOUS at 16:39

## 2022-12-08 RX ADMIN — LACTULOSE 10 G: 20 SOLUTION ORAL at 10:02

## 2022-12-08 RX ADMIN — ACETYLCYSTEINE 2 ML: 200 SOLUTION ORAL; RESPIRATORY (INHALATION) at 07:16

## 2022-12-08 RX ADMIN — INSULIN ASPART 3 UNITS: 100 INJECTION, SOLUTION INTRAVENOUS; SUBCUTANEOUS at 19:54

## 2022-12-08 RX ADMIN — ACETYLCYSTEINE 2 ML: 200 SOLUTION ORAL; RESPIRATORY (INHALATION) at 19:00

## 2022-12-08 RX ADMIN — ACYCLOVIR: 50 OINTMENT TOPICAL at 07:53

## 2022-12-08 RX ADMIN — HYDROCORTISONE SODIUM SUCCINATE 25 MG: 100 INJECTION, POWDER, FOR SOLUTION INTRAMUSCULAR; INTRAVENOUS at 02:10

## 2022-12-08 RX ADMIN — INSULIN ASPART 2 UNITS: 100 INJECTION, SOLUTION INTRAVENOUS; SUBCUTANEOUS at 04:48

## 2022-12-08 RX ADMIN — HYDROCORTISONE SODIUM SUCCINATE 25 MG: 100 INJECTION, POWDER, FOR SOLUTION INTRAMUSCULAR; INTRAVENOUS at 21:00

## 2022-12-08 RX ADMIN — Medication 1 DROP: at 16:34

## 2022-12-08 RX ADMIN — ACETYLCYSTEINE 2 ML: 200 SOLUTION ORAL; RESPIRATORY (INHALATION) at 15:49

## 2022-12-08 ASSESSMENT — ACTIVITIES OF DAILY LIVING (ADL)
ADLS_ACUITY_SCORE: 38

## 2022-12-08 NOTE — PROGRESS NOTES
Renal Medicine Progress Note            Assessment/Plan:     Blanco Osborne is a 57-year-old male with PMH CARRILLO s/p liver transplant (9/2016) and cirrhosis admitted 11/12/2022 with out of hospital PEA arrest and acute hypoxemic respiratory failure. Course complicated by parainfluenza virus, streptococcal bacteremia, and LORETTA requiring CRRT.         1.  Severe anuric LORETTA:               -CRRT stopped 11/22 and resume on 11/24 and stopped 11/26.               -IHD started 11/29/30. This week ran Sunday 12/4, 12/6 and today.                 2.  s/p Septic shock with MODS:                -no current pressors                  3.  Respiratory failure: current 30%  FIo2              -multiple re intubations               -R Pneumothorax               -Aspergillus PNA             4.  ESLD due to CARRILLO s/p liver transplant                - tacrolimus 1mg BID     5.  Hypoalbuminemia      6. Anemia       Plan/Recs:  1) HD today, doing every other day HD  2) pressors started this am to allow UF, goal 2kg. Can weak off as able  3) dispo to LTACH, continue HD there and monitor for recovery    Torsten Montaño DO  [facility] consultants  Office: [phone]  Cell: [phone]        Interval History:     Pt seen on dialysis, hypotensive at start of run, levophed started to allow some ultrafiltration.           Medications and Allergies:       - MEDICATION INSTRUCTIONS for Dialysis Patients -   Does not apply See Admin Instructions     sodium chloride 0.9%  250 mL Intravenous Once in dialysis/CRRT     sodium chloride 0.9%  300 mL Hemodialysis Machine Once     acetylcysteine  2 mL Nebulization 4x Daily     acyclovir   Topical Q8H     apixaban ANTICOAGULANT  5 mg Oral or Feeding Tube BID     artificial tears  1 drop Both Eyes TID     chlorhexidine  15 mL Mouth/Throat Q12H     folic acid  1 mg Intravenous Daily     sodium chloride (PF) 0.9%  10 mL Intracatheter Once in dialysis/CRRT    Followed by     heparin  1.3-2.6 mL Intracatheter  Once in dialysis/CRRT     sodium chloride (PF) 0.9%  10 mL Intracatheter Once in dialysis/CRRT    Followed by     heparin  1.3-2.6 mL Intracatheter Once in dialysis/CRRT     heparin lock flush  5-20 mL Intracatheter Q24H     hydrocortisone sodium succinate PF  25 mg Intravenous Q6H     insulin aspart  1-12 Units Subcutaneous Q4H     insulin glargine  24 Units Subcutaneous QAM AC     ipratropium - albuterol 0.5 mg/2.5 mg/3 mL  3 mL Nebulization 4x daily     lactulose  10 g Oral or Feeding Tube TID     multivitamins w/minerals  15 mL Per Feeding Tube Daily     - MEDICATION INSTRUCTIONS -   Does not apply Once     pantoprazole  40 mg Per Feeding Tube QAM AC    Or     pantoprazole  40 mg Intravenous QAM AC     polyethylene glycol  17 g Oral or Feeding Tube Daily     senna-docusate  1 tablet Oral or Feeding Tube BID    Or     senna-docusate  2 tablet Oral or Feeding Tube BID     sodium chloride (PF)  10-40 mL Intracatheter Q8H     tacrolimus  1 mg Oral or Feeding Tube BID      No Known Allergies         Physical Exam:   Vitals were reviewed  /86   Pulse 81   Temp 98.3  F (36.8  C) (Oral)   Resp 17   Ht 1.829 m (6')   Wt 84.1 kg (185 lb 6.5 oz)   SpO2 99%   BMI 25.15 kg/m      Wt Readings from Last 3 Encounters:   12/08/22 84.1 kg (185 lb 6.5 oz)   10/07/19 137.5 kg (303 lb 3.2 oz)   10/04/19 131.5 kg (290 lb)       Intake/Output Summary (Last 24 hours) at 12/8/2022 0917  Last data filed at 12/8/2022 0600  Gross per 24 hour   Intake 1310 ml   Output 175 ml   Net 1135 ml       GENERAL APPEARANCE: awake, alert.   HEENT:  Trach present  RESP: lungs clear ant/lat  CV: tachy, regular  ABDOMEN: o/s/nt/nd, bs present  EXTREMITIES/SKIN: no c/c/rashes/lesions; no dependnet edema  LINES:  Right tunneled dialysis catheter present           Data:     BMP  Recent Labs   Lab 12/08/22  0814 12/08/22  0446 12/08/22  0443 12/07/22  2348 12/07/22  0454 12/07/22  0448 12/06/22  0520 12/06/22  0513 12/04/22  0952 12/04/22  0941    NA  --  137  --   --   --  138  --  136  --  138   POTASSIUM  --  3.9  --   --   --  3.6  --  3.8  --  3.7   CHLORIDE  --  100  --   --   --  101  --  98  --  102   KELLY  --  7.8*  --   --   --  8.2*  --  7.9*  --  7.9*   CO2  --  29  --   --   --  27  --  25  --  22   BUN  --  84*  --   --   --  57*  --  95*  --  107*   CR  --  3.51*  --   --   --  2.51*  --  3.80*  --  4.04*   * 183* 172* 183*   < > 190*   < > 178*   < > 234*    < > = values in this interval not displayed.     CBC  Recent Labs   Lab 12/08/22  0446 12/07/22  0448 12/06/22  1825 12/06/22  1448   WBC 4.6 5.0 4.9 3.3*   HGB 7.8* 8.0* 8.4* 7.1*   HCT 26.2* 26.0* 27.2* 23.2*   * 113* 110* 112*   PLT 49* 53* 59* 43*     Lab Results   Component Value Date    AST 13 12/03/2022    ALT 25 12/03/2022    ALKPHOS 240 (H) 12/03/2022    BILITOTAL 1.0 12/03/2022    CHANDLER 76 (H) 11/30/2022     Lab Results   Component Value Date    INR 1.16 (H) 12/06/2022       Attestation:  I have reviewed today's vital signs, notes, medications, labs and imaging.    DO [facility] - Nephrology  Office: [phone]  Cell: [phone]

## 2022-12-08 NOTE — PROGRESS NOTES
12/08/22 1331   Appointment Info   Signing Clinician's Name / Credentials (PT) Brittany Dressler, DPT   Living Environment   Living Environment Comments See initial eval.   General Information   Onset of Illness/Injury or Date of Surgery 12/06/22   Referring Physician Rainer Jones MD   Existing Precautions/Restrictions fall;oxygen therapy device and L/min   General Observations Vent: AC, PEEP 5, FiO2 30%, RR 22-27 during PT. BPs stable throughout. HR up to ~110.   Cognition   Affect/Mental Status (Cognition) flat/blunted affect;sad/depressed affect   Follows Commands (Cognition) follows one-step commands   Pain Assessment   Patient Currently in Pain No   Posture    Posture Comments Impaired stability & controlled mobility given severe deconditioning.   Range of Motion (ROM)   ROM Comment WFL, DF > neutral B with knees extended.   Strength (Manual Muscle Testing)   Strength Comments Fasciculations at quads with SAQs, possibly with hip abd/add, total asisst, and DF (pt doing < 25%).   Upper Extremity (Manual Muscle Testing)   Comment, MMT: Upper Extremity Pt able to wrist ext to wave, can shake & nod head, fair head control in sititng.   Bed Mobility   Comment, (Bed Mobility) Total Ax2 for roll with sling, pt helping with head control. See rx.   Transfers   Comment, (Transfers) Lindsey & combilizer indicated.   Gait/Stairs (Locomotion)   Distance in Feet Non-amb.   Balance   Balance Comments Total Ax2 static sitting balance with fair head control and stable BP.   Clinical Impression   Criteria for Skilled Therapeutic Intervention Yes, treatment indicated   PT Diagnosis (PT) Impaired mobility   Influenced by the following impairments Impaired strength, balance, activity tolerance   Functional limitations due to impairments Impaired independent mobility & living   Clinical Presentation (PT Evaluation Complexity) Evolving/Changing   Clinical Presentation Rationale Medical picture   Clinical Decision Making  (Complexity) high complexity   Planned Therapy Interventions (PT) balance training;bed mobility training;gait training;home exercise program;neuromuscular re-education;patient/family education;postural re-education;strengthening;stair training;transfer training;progressive activity/exercise;risk factor education;home program guidelines   Anticipated Equipment Needs at Discharge (PT) hospital bed;lift device;wheelchair;wheelchair cushion   Risk & Benefits of therapy have been explained evaluation/treatment results reviewed;care plan/treatment goals reviewed;risks/benefits reviewed;current/potential barriers reviewed;participants voiced agreement with care plan;participants included;patient   PT Total Evaluation Time   PT Eval, Re-eval Minutes (15046) 10   Physical Therapy Goals   PT Frequency 5x/week   PT Predicted Duration/Target Date for Goal Attainment 12/16/22   PT Goals Bed Mobility;Transfers;Gait;PT Goal 1   PT: Bed Mobility Moderate assist;Supine to/from sit;Rolling   PT: Transfers Maximum assist;Sit to/from stand;Assistive device   PT: Gait Maximum assist;5 feet;Assistive device   PT: Goal 1 ModA unsupported sitting balance   Therapeutic Procedure/Exercise   Ther. Procedure: strength, endurance, ROM, flexibillity Minutes (55106) 10   Symptoms Noted During/After Treatment fatigue   Treatment Detail/Skilled Intervention PT: AA cued SAQs, APs, hip abd/add 1 set 10-15reps each B, pt fatigued from mobility, wanting to be done.   Therapeutic Activity   Therapeutic Activities: dynamic activities to improve functional performance Minutes (98986) 20   Symptoms Noted During/After Treatment Fatigue   Treatment Detail/Skilled Intervention Pt agreeable to PT, communicates well with head shakes and eye expressions: rolling B total assist with only head control helping noted, ibeth totalAx2 to EOB for sitting balance - BPs stable in bed and sitting though activity tolerance poor, pt not feeling well when asked and gave the  "\"big head shake cue\" to lie down. Total Ax2 scoot & repo, alarm armed. TEchniuqe cues throughout.   Neuromuscular Re-education   Neuromuscular Re-Education Minutes (36406) 8   Symptoms Noted During/After Treatment fatigue   Treatment Detail/Skilled Intervention PT: EOB sitting balance with sling still in place. Total Ax2 - aide behind and PT at front. Vitals stable. Cues for weight-shift to maintain midline with help.   PT Discharge Planning   PT Plan PT: strengthening, sitting balance, combilizer, etc.   PT Discharge Recommendation (DC Rec) LTACH, pending meets medical criteria;Transitional Care Facility   PT Rationale for DC Rec Pt below independent baseline, now total Ax2 with lift - rehab warranted to maximize function and decrease caregiver burden.   PT Brief overview of current status Total asisst bed mobility, ibeth. Fair head control, fasciculations in LEs and small amplitude in UEs.   Total Session Time   Timed Code Treatment Minutes 38   Total Session Time (sum of timed and untimed services) 48     "

## 2022-12-08 NOTE — PLAN OF CARE
Neuro: A&O. Follows commands. SOTO, 1-2/4. PERRLA.  CV: Normotensive. HR90's-100's.   Respiratory: LS coarse. Moderate tan sputum. Trached/ Trach dome all night.  GI/: Anuric. On HD. Rectal tube patent. Minimal output.  Skin: Scattered bruising. Crusted lesions on mouth, nose.   Activity: BR lift.  Diet: NPO/ TF at goal.   Drips: None.  Other:   Plan: Awaiting resubmission of Prior Auth.

## 2022-12-08 NOTE — PROGRESS NOTES
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 Acute respiratory failure - he remains on 30% and +5 PEEP, trach in place and has been receiving pulmonary hygiene measures for 24-48 hours. His CXR has cleared and on exam his left lung is clear today.  We will continue to pulmonary hygiene measures are crucial to keeping his lungs clear  2. Pneumonia - strep and parainfluenza; He has completed antibiotics and remains off them; monitor closely.   3. ID- voriconazole off; completed 15 days of Rocephin  4. Shock - he is off vasopressors   5. Acute renal failure - HD was yesterday.    6. S/p initial arrest and prolonged resuscitation (11.9) and hypothermia resuscitation   7. History of Liver Transplant 2016 - tacrolimus at 1 mg bid and on solucortef. Will have Tx service reassess rejection meds.   8. History of HTN  9. CHRISTIAN on BIPAP when not on vent   10. Nutrition - enteral resumed via PEG   11. CNS- awake and following commands, long term CNS prognosis is likely favorable.  12. Atrial fibrillation, likely paroxysmal and with concern of prior CVA's will continue with apixaban at 5 BID and stop IV Heparin    Overall, he remains critical and is ready for LTAC.         Summary/Hospital Course:            Assessment and plan :     Blanco Osborne IS a 57 year old male admitted on 11/12/2022 for acute respiratory failure.   I have personally reviewed the daily labs, imaging studies, cultures and discussed the case with referring physician and consulting physicians.     My assessment and plan by system for this patient is as follows:    Neurology/Psychiatry:   1. Awake and alert and following commands; moves 4 extremities weakly     Cardiovascular:   1.Hemodynamics - well compensated     Pulmonary/Ventilator Management:   1. He is on 30%, +5 PEEP and tolerating trach dome at times    GI and Nutrition :   1.enteral nutrition     Renal/Fluids/Electrolytes:   1. Patient is on hemodialysis     Infectious Disease:   1. Off antibiotics     Endocrine:   1. Glucoses ok; no changes today     Hematology/Oncology:   1. Hb 8.0     IV/Access:   1. Venous access -   2. Arterial access -   3.  Plan  - central access required and necessary      ICU Prophylaxis:   1. DVT: Hep Subq/ LMWH/mechanical  2. VAP: HOB 30 degrees, chlorhexidine rinse  3. Stress Ulcer: PPI/H2 blocker  4. Restraints: Nonviolent soft two point restraints required and necessary for patient safety and continued cares and good effect as patient continues to pull at necessary lines, tubes despite education and distraction. Will readdress daily.   5. IV Access - central access required and necessary for continued patient cares  6. Feeding - enteral nutrition   7. Family Update: deferred today  8. Disposition - ICU care        Key goals for next 24 hours:   1. Continue pulmonary hygiene   2.  3.               Interim History:              Key Medications:       - MEDICATION INSTRUCTIONS for Dialysis Patients -   Does not apply See Admin Instructions     acetylcysteine  2 mL Nebulization 4x Daily     acyclovir   Topical Q8H     apixaban ANTICOAGULANT  5 mg Oral or Feeding Tube BID     artificial tears  1 drop Both Eyes TID     chlorhexidine  15 mL Mouth/Throat  Q12H     folic acid  1 mg Intravenous Daily     heparin lock flush  5-20 mL Intracatheter Q24H     hydrocortisone sodium succinate PF  25 mg Intravenous Q6H     insulin aspart  1-12 Units Subcutaneous Q4H     insulin glargine  24 Units Subcutaneous QAM AC     ipratropium - albuterol 0.5 mg/2.5 mg/3 mL  3 mL Nebulization 4x daily     lactulose  10 g Oral or Feeding Tube TID     multivitamins w/minerals  15 mL Per Feeding Tube Daily     pantoprazole  40 mg Per Feeding Tube QAM AC    Or     pantoprazole  40 mg Intravenous QAM AC     polyethylene glycol  17 g Oral or Feeding Tube Daily     senna-docusate  1 tablet Oral or Feeding Tube BID    Or     senna-docusate  2 tablet Oral or Feeding Tube BID     sodium chloride (PF)  10-40 mL Intracatheter Q8H     tacrolimus  1 mg Oral or Feeding Tube BID       dextrose       - MEDICATION INSTRUCTIONS -       - MEDICATION INSTRUCTIONS -       norepinephrine Stopped (12/06/22 1000)     sodium chloride 0 mL/hr at 11/22/22 0742               Physical Examination:   Temp:  [97.3  F (36.3  C)-98.2  F (36.8  C)] 98.2  F (36.8  C)  Pulse:  [] 102  Resp:  [12-33] 23  BP: ()/(54-97) 104/71  FiO2 (%):  [30 %-40 %] 40 %  SpO2:  [90 %-100 %] 99 %    Intake/Output Summary (Last 24 hours) at 12/7/2022 1910  Last data filed at 12/7/2022 1600  Gross per 24 hour   Intake 940 ml   Output 2100 ml   Net -1160 ml     Wt Readings from Last 4 Encounters:   12/07/22 83.3 kg (183 lb 10.3 oz)   10/07/19 137.5 kg (303 lb 3.2 oz)   10/04/19 131.5 kg (290 lb)   02/20/19 140.4 kg (309 lb 9.6 oz)     BP - Mean:  [] 78  Vent Mode: CMV/AC  (Continuous Mandatory Ventilation/ Assist Control)  FiO2 (%): 40 %  Resp Rate (Set): 16 breaths/min  Tidal Volume (Set, mL): 450 mL  PEEP (cm H2O): 7 cmH2O  Resp: 23    Recent Labs   Lab 12/05/22  2251   O2PER 40       GEN: no acute distress   HEENT: head ncat, sclera anicteric, OP patent, trachea midline   PULM: unlabored synchronous with vent, clear  anteriorly    CV/COR: RRR S1S2 no gallop,  No rub, no murmur  ABD: soft nontender,   EXT:  Mild edema   warm  NEURO: grossly intact, but very weak  SKIN: no obvious rash; no cyanosis or mottling   LINES: clean, dry intact         Data:   All data and imaging reviewed     ROUTINE ICU LABS (Last four results)  CMP  Recent Labs   Lab 12/07/22  1638 12/07/22  1200 12/07/22  0746 12/07/22  0454 12/07/22  0448 12/06/22  0520 12/06/22  0513 12/05/22  0736 12/05/22  0440 12/04/22  0952 12/04/22  0941 12/03/22  0751 12/03/22  0513   NA  --   --   --   --  138  --  136  --   --   --  138  --  137   POTASSIUM  --   --   --   --  3.6  --  3.8  --   --   --  3.7  --  3.8   CHLORIDE  --   --   --   --  101  --  98  --   --   --  102  --  102   CO2  --   --   --   --  27  --  25  --   --   --  22  --  25   ANIONGAP  --   --   --   --  10  --  13  --   --   --  14  --  10   * 213* 221* 192* 190*   < > 178*   < > 212*   < > 234*   < > 210*   BUN  --   --   --   --  57*  --  95*  --   --   --  107*  --  75*   CR  --   --   --   --  2.51*  --  3.80*  --   --   --  4.04*  --  3.14*   GFRESTIMATED  --   --   --   --  29*  --  18*  --   --   --  16*  --  22*   KELLY  --   --   --   --  8.2*  --  7.9*  --   --   --  7.9*  --  7.7*   PHOS  --   --   --   --  2.2*  --  4.3  --  3.7  --   --   --   --    PROTTOTAL  --   --   --   --   --   --   --   --   --   --   --   --  5.1*   ALBUMIN  --   --   --   --  1.9*  --  1.9*  --   --   --   --   --  1.8*   BILITOTAL  --   --   --   --   --   --   --   --   --   --   --   --  1.0   ALKPHOS  --   --   --   --   --   --   --   --   --   --   --   --  240*   AST  --   --   --   --   --   --   --   --   --   --   --   --  13   ALT  --   --   --   --   --   --   --   --   --   --   --   --  25    < > = values in this interval not displayed.     CBC  Recent Labs   Lab 12/07/22  0448 12/06/22  1825 12/06/22  1448 12/06/22  0513   WBC 5.0 4.9 3.3* 8.0   RBC 2.31* 2.47* 2.07* 2.26*   HGB 8.0* 8.4*  7.1* 7.8*   HCT 26.0* 27.2* 23.2* 25.5*   * 110* 112* 113*   MCH 34.6* 34.0* 34.3* 34.5*   MCHC 30.8* 30.9* 30.6* 30.6*   RDW 18.6* 17.8* 17.9* 17.6*   PLT 53* 59* 43* 76*     INR  Recent Labs   Lab 12/06/22  0513 12/01/22  1303   INR 1.16* 1.17*     Arterial Blood Gas  Recent Labs   Lab 12/05/22  2251   O2PER 40       All cultures:  No results for input(s): CULT in the last 168 hours.  Recent Results (from the past 24 hour(s))   XR Chest Port 1 View    Narrative    EXAM: XR CHEST PORTABLE 1 VIEW  LOCATION: [facility]  DATE/TIME: 12/07/2022, 4:52 AM    INDICATION: Follow-up atelectasis.  COMPARISON: 12/05/2022.      Impression    IMPRESSION: Right-sided dialysis catheter with tip over the right atrium. Right-sided PICC line with tip over the SVC. Significant interval decrease of the left pleural effusion since the prior study. There remains at least a small left pleural effusion.   Left basilar infiltrate and/or atelectasis also improved from previous. Hazy increased opacity over the right lower hemithorax probably due to a combination of right pleural effusion and increasing atelectasis at the right lung base since the prior   study. Heart size upper limits of normal. Pulmonary vascularity within normal limits. No significant bony abnormalities.         MD Jessica    Billing: This patient is critically ill: YES Total critical care time today 35 min.

## 2022-12-08 NOTE — PROGRESS NOTES
Per pt, stayed on HFTD 30L 40% the entire night with no respiratory distress or any issue. Vent standby in the room and RT continues to monitor the pt.     Yo Alfaro RT.

## 2022-12-08 NOTE — PROGRESS NOTES
[facility] at [facility] ([address])    I was able to speak to patient's wife, Ofe, on the phone and update her on the status of the denied peer to peer with insurance and the now pending appeal for LTACH prior authorization.   An appeal to insurance has been submitted and is currently pending.  We discussed [facility] and expectations and I was able to answer all of her questions.  We will continue to follow for clinical appropriateness and will update care management when we wear the outcome of the insurance appeal.        Kalyani Villagomez, PT  LTACH Referral Specialist    [facility]    [address] [place] [id number]  Office: [phone]  Fax: [phone]     CONFIDENTIAL Protected under [place] Statute  145.61 et seq

## 2022-12-08 NOTE — PROGRESS NOTES
RT PROGRESS NOTE  CURRENT SETTINGS:   Vent Mode: CMV/AC  (Continuous Mandatory Ventilation/ Assist Control)  FiO2 (%): 30 %  Resp Rate (Set): 16 breaths/min  Tidal Volume (Set, mL): 450 mL  PEEP (cm H2O): 5 cmH2O  Resp: 18      PATIENT PARAMETERS:  PIP 27  Pplat:  12  Pmean:  9    Secretions:  Thick, moderate, creamy/cloudy  02 Sats:  97%  BS: Diminished, coarse    Trach size 6XLT    Respiratory Medications: DuoNeb & Mucomyst    VB22 pH 7.30; pCO2 54; pO2 38; HCO3 27, BE 0 on 40%    NOTE / SHIFT SUMMARY:   Pt was on trach dome this morning and was lethargic. Put pt back to vent of CMV/AC mode of tidal volume 450mL, RR16, PEEP5, and FiO2 30%. Gave DuoNeb & Mucimyst treatment x2 with Volera therapy  BS post treatment increased aerosoal & coarse, patient had significant improvement, patient tolerated well. Did trach change, a lot of secretions around site. Secretion is thick moderate, creamy/cloudy. RT will continue to monitor patient.     Yaenth Norton, RT

## 2022-12-08 NOTE — PROGRESS NOTES
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 Acute respiratory failure - he remains on 30% and +5 PEEP, trach in place and receiving pulmonary hygiene measures. We will continue pulmonary hygiene measures, crucial to keeping his lungs clear. He is tolerating only about 12 hours of trach dome per day before getting tired; plan to lengthen this slowly.   2. Pneumonia - strep and parainfluenza; He has completed antibiotics and remains off them; monitor closely.   3. ID- voriconazole off; completed 15 days of Rocephin  4. Shock - off vasopressors, except occasionally when on HD with high fluid removal goals  5. Acute renal failure - HD  6. S/p initial arrest and prolonged resuscitation (11.9) and hypothermia resuscitation   7. History of Liver Transplant 2016 - tacrolimus at 1 mg bid and on solucortef. Will have Tx service reassess rejection meds.   8. History of HTN  9. CHRISTIAN on BIPAP when not on vent   10. Nutrition - enteral resumed via PEG   11. CNS- awake and following commands, long term CNS prognosis is likely favorable.  12. Atrial fibrillation, likely paroxysmal and with concern of prior CVA's will continue with apixaban at 5 BID   13. Platelets 49k; follow only, and obvious explanation  is not yet clear          Summary/Hospital Course:           Assessment and plan :     Blanco Osborne IS a 57 year old male admitted on 11/12/2022 for acute respiratory failure.   I have personally reviewed the daily labs, imaging studies, cultures and discussed the case with referring physician and consulting physicians.     My assessment and plan by system for this patient is as follows:    Neurology/Psychiatry:   1. Alert and following commands; weak    Cardiovascular:   1.Hemodynamics - well compensated; occ levophed hwene    Pulmonary/Ventilator Management:   1. 30%, +5 PEEP; aggressive pulmonary hygiene     GI and Nutrition :   1. Enteral nutrition     Renal/Fluids/Electrolytes:   1. He remains on HD    Infectious Disease:   1. Off antibiotics at present     Endocrine:   1. Glucoses - no changes todaya    Hematology/Oncology:   1. Hb 7.8; follow platelets daily      IV/Access:   1. Venous access -   2. Arterial access -   3.  Plan  - central access required and necessary      ICU Prophylaxis:   1. DVT: Hep Subq/ LMWH/mechanical  2. VAP: HOB 30 degrees, chlorhexidine rinse  3. Stress Ulcer: PPI/H2 blocker  4. Restraints: Nonviolent soft two point restraints required and necessary for patient safety and continued cares and good effect as patient continues to pull at necessary lines, tubes despite education and distraction. Will readdress daily.   5. IV Access - central access required and necessary for continued patient cares  6. Feeding - enteral   7. Family Update: deferred   8. Disposition - ICU care         Key goals for next 24 hours:   1.  2.  3.               Interim History:              Key Medications:       - MEDICATION INSTRUCTIONS for Dialysis Patients -   Does not apply See Admin Instructions     sodium chloride 0.9%  250 mL Intravenous Once in dialysis/CRRT     sodium chloride 0.9%  300 mL Hemodialysis Machine Once     acetylcysteine  2 mL Nebulization 4x Daily     acyclovir   Topical Q8H      apixaban ANTICOAGULANT  5 mg Oral or Feeding Tube BID     artificial tears  1 drop Both Eyes TID     chlorhexidine  15 mL Mouth/Throat Q12H     folic acid  1 mg Intravenous Daily     sodium chloride (PF) 0.9%  10 mL Intracatheter Once in dialysis/CRRT    Followed by     heparin  1.3-2.6 mL Intracatheter Once in dialysis/CRRT     sodium chloride (PF) 0.9%  10 mL Intracatheter Once in dialysis/CRRT    Followed by     heparin  1.3-2.6 mL Intracatheter Once in dialysis/CRRT     heparin lock flush  5-20 mL Intracatheter Q24H     hydrocortisone sodium succinate PF  25 mg Intravenous Q6H     insulin aspart  1-12 Units Subcutaneous Q4H     insulin glargine  24 Units Subcutaneous QAM AC     ipratropium - albuterol 0.5 mg/2.5 mg/3 mL  3 mL Nebulization 4x daily     lactulose  10 g Oral or Feeding Tube TID     multivitamins w/minerals  15 mL Per Feeding Tube Daily     - MEDICATION INSTRUCTIONS -   Does not apply Once     pantoprazole  40 mg Per Feeding Tube QAM AC    Or     pantoprazole  40 mg Intravenous QAM AC     sodium chloride (PF)  10-40 mL Intracatheter Q8H     tacrolimus  1 mg Oral or Feeding Tube BID       dextrose       - MEDICATION INSTRUCTIONS -       - MEDICATION INSTRUCTIONS -       norepinephrine Stopped (12/08/22 1156)     sodium chloride 0 mL/hr at 11/22/22 0742               Physical Examination:   Temp:  [97.1  F (36.2  C)-98.3  F (36.8  C)] 97.7  F (36.5  C)  Pulse:  [] 91  Resp:  [12-52] 18  BP: ()/(45-97) 100/61  FiO2 (%):  [30 %-40 %] 30 %  SpO2:  [90 %-100 %] 97 %    Intake/Output Summary (Last 24 hours) at 12/8/2022 1317  Last data filed at 12/8/2022 1100  Gross per 24 hour   Intake 1060 ml   Output 2175 ml   Net -1115 ml     Wt Readings from Last 4 Encounters:   12/08/22 84.1 kg (185 lb 6.5 oz)   10/07/19 137.5 kg (303 lb 3.2 oz)   10/04/19 131.5 kg (290 lb)   02/20/19 140.4 kg (309 lb 9.6 oz)     BP - Mean:  [] 72  Vent Mode: CMV/AC  (Continuous Mandatory Ventilation/ Assist  Control)  FiO2 (%): 30 %  Resp Rate (Set): 16 breaths/min  Tidal Volume (Set, mL): 450 mL  PEEP (cm H2O): 5 cmH2O  Resp: 18    Recent Labs   Lab 12/05/22  2251   O2PER 40       GEN: no acute distress   HEENT: head ncat, sclera anicteric, OP patent, trachea midline   PULM: unlabored synchronous with vent, clear anteriorly    CV/COR: RRR S1S2 no gallop,  No rub, no murmur  ABD: soft nontender, hypoactive bowel sounds, no mass  EXT:  Edema   warm  NEURO: grossly intact  SKIN: no obvious rash  LINES: clean, dry intact         Data:   All data and imaging reviewed     ROUTINE ICU LABS (Last four results)  CMP  Recent Labs   Lab 12/08/22  1139 12/08/22  0814 12/08/22  0446 12/08/22  0443 12/07/22  0454 12/07/22  0448 12/06/22  0520 12/06/22  0513 12/05/22  0736 12/05/22  0440 12/04/22  0952 12/04/22  0941 12/03/22  0751 12/03/22  0513   NA  --   --  137  --   --  138  --  136  --   --   --  138  --  137   POTASSIUM  --   --  3.9  --   --  3.6  --  3.8  --   --   --  3.7  --  3.8   CHLORIDE  --   --  100  --   --  101  --  98  --   --   --  102  --  102   CO2  --   --  29  --   --  27  --  25  --   --   --  22  --  25   ANIONGAP  --   --  8  --   --  10  --  13  --   --   --  14  --  10   * 152* 183* 172*   < > 190*   < > 178*   < > 212*   < > 234*   < > 210*   BUN  --   --  84*  --   --  57*  --  95*  --   --   --  107*  --  75*   CR  --   --  3.51*  --   --  2.51*  --  3.80*  --   --   --  4.04*  --  3.14*   GFRESTIMATED  --   --  19*  --   --  29*  --  18*  --   --   --  16*  --  22*   KELLY  --   --  7.8*  --   --  8.2*  --  7.9*  --   --   --  7.9*  --  7.7*   PHOS  --   --  3.3  --   --  2.2*  --  4.3  --  3.7  --   --   --   --    PROTTOTAL  --   --   --   --   --   --   --   --   --   --   --   --   --  5.1*   ALBUMIN  --   --  1.9*  --   --  1.9*  --  1.9*  --   --   --   --   --  1.8*   BILITOTAL  --   --   --   --   --   --   --   --   --   --   --   --   --  1.0   ALKPHOS  --   --   --   --   --   --   --    --   --   --   --   --   --  240*   AST  --   --   --   --   --   --   --   --   --   --   --   --   --  13   ALT  --   --   --   --   --   --   --   --   --   --   --   --   --  25    < > = values in this interval not displayed.     CBC  Recent Labs   Lab 12/08/22  0446 12/07/22  0448 12/06/22  1825 12/06/22  1448   WBC 4.6 5.0 4.9 3.3*   RBC 2.28* 2.31* 2.47* 2.07*   HGB 7.8* 8.0* 8.4* 7.1*   HCT 26.2* 26.0* 27.2* 23.2*   * 113* 110* 112*   MCH 34.2* 34.6* 34.0* 34.3*   MCHC 29.8* 30.8* 30.9* 30.6*   RDW 18.7* 18.6* 17.8* 17.9*   PLT 49* 53* 59* 43*     INR  Recent Labs   Lab 12/06/22  0513   INR 1.16*     Arterial Blood Gas  Recent Labs   Lab 12/05/22  2251   O2PER 40       All cultures:  No results for input(s): CULT in the last 168 hours.  No results found for this or any previous visit (from the past 24 hour(s)).adi Lopez MD    Billing: This patient is critically ill:Yes Total critical care time today  45e min.e

## 2022-12-08 NOTE — PROGRESS NOTES
DIALYSIS PROCEDURE NOTE  Hepatitis status of previous patient on machine log was checked and verified ok to use with this patients hepatitis status.  Patient dialyzed for 3 hrs. on a K3 bath with a net fluid removal of  2L.  A BFR of 400 ml/min was obtained via a RIJ. The treatment plan was discussed with Dr. Montaño during the treatment.    Total heparin received during the treatment: 0 units.      Line flushed, clamped and capped with heparin 1:1000 1.3 mL (1300 units) per lumen     Meds  given: none   Complications: Pt was hypotensive prior to HD (SBP in the 70's), 500 ml NS was given at HD initiation and ICU team restarted Levo.  BP stabilized and UF of 2L was achieved.     Person educated: patient's significant other. Knowledge base none. Barriers to learning: none. Educated on procedure via verbal mode. patient/family verbalized understanding. Pt prefers verbal education style.      ICEBOAT? Timeout performed pre-treatment  I: Patient was identified using 2 identifiers  C:  Consent Signed Yes  E: Equipment preventative maintenance is current and dialysis delivery system OK to use  B: Hepatitis B Surface Antigen: Negative; Draw Date:11/14/22      Hepatitis B Surface Antibody: Susceptible; Draw Date: 11/14/22  O: Dialysis orders present and complete prior to treatment  A: Vascular access verified and assessed prior to treatment  T: Treatment was performed at a clinically appropriate time  ?: Patient was allowed to ask questions and address concerns prior to treatment  See Adult Hemodialysis flowsheet in [facility] for further details and post assessment.  Machine water alarm in place and functioning. Transducer pods intact and checked every 15min.   Pt dialyzed at bedside in ICU.  Chlorine/Chloramine water system checked every 4 hours.  Outpatient Dialysis at [facility].     Patient repositioned every 2 hours during the treatment.  Post treatment report given to MILLY Joyner RN regarding 2L of fluid removed, last BP of  101/72.

## 2022-12-08 NOTE — PROGRESS NOTES
VSS on vent this AM, placed on Trach Dome early and tolerated well for remainder of shift; currently 40%/30L. Able to answer yes/no questions, denies pain and attempts to mouth communication. Remains weak, required Q2H turns in bed. Used lift up to chair this afternoon with good tolerance. PICC SL, meds as ordered. TF via PEG at goal rate, good tolerance, meds as ordered. Rectal tube in place with minimal leak. BG with sliding scale insulin as ordered. Anuric, HD cath in place. Re-evaluated by OT this shift. Wife at bedside, supportive and involved in cares. Tele SR/ST. Bed alarms in place, not attempting to exit bed this shift. Continue to monitor.

## 2022-12-08 NOTE — PROGRESS NOTES
Care Management Follow Up    Length of Stay (days): 26    Expected Discharge Date: 2022     Concerns to be Addressed: discharge planning     Patient plan of care discussed at interdisciplinary rounds: Yes    Anticipated Discharge Disposition:  (LTACH)     Anticipated Discharge Services: Transportation Services (stretcher)  Anticipated Discharge DME: Oxygen (ventilator)    Patient/family educated on Medicare website which has current facility and service quality ratings: yes  Education Provided on the Discharge Plan:    Patient/Family in Agreement with the Plan: yes    Referrals Placed by CM/SW: Post Acute Facilities  Private pay costs discussed: insurance costs TBD    Additional Information:  Following for discharge to LTACH [place]. Talked with Kalyani and she is still waiting on authorization from [facility] but she will follow up today.   Per MD Pt is not ready for transfer but will be ready .     Addendum 1330: Kalyani updated and stated she will follow up with [facility].  Stated to arrange ride for between 2-4pm on .   CC called wife Ofe to update of transfer/transportation.  Voices understanding.  She has some questions regarding LTACH.  CC will have Kalyani call Wife to discus.     CC called  "[employer] Transport and set up stretcher ride w/ vent (off/on vent/ trach dome)+ IV.  Ride for 2pm pending 911 calls  PCS on chart    1450:  Kalyani called and stated [facility] needs MD to do Peer to peer  MD to call [phone] option 5.  Must provide Pt's name/ and [facility] ID [id number]  Dr. Turner updated and he will call Peer to Peer    Addendum 1600:  Per [facility]alyani at [facility]. [facility] reasoning is that since he is tolerating >2.5 hrs. of trach dome he should be able to wean in short time frame and not need 25 days at an LTAC to meet this goal.    CC to follow for discharge to [facility] pending authorization.       Alethea Mclean RN

## 2022-12-09 ENCOUNTER — APPOINTMENT (OUTPATIENT)
Dept: PHYSICAL THERAPY | Facility: CLINIC | Age: 57
DRG: 004 | End: 2022-12-09
Attending: INTERNAL MEDICINE
Payer: COMMERCIAL

## 2022-12-09 ENCOUNTER — APPOINTMENT (OUTPATIENT)
Dept: OCCUPATIONAL THERAPY | Facility: CLINIC | Age: 57
DRG: 004 | End: 2022-12-09
Attending: INTERNAL MEDICINE
Payer: COMMERCIAL

## 2022-12-09 LAB
ALBUMIN SERPL-MCNC: 2 G/DL (ref 3.4–5)
ALP SERPL-CCNC: 301 U/L (ref 40–150)
ALT SERPL W P-5'-P-CCNC: 20 U/L (ref 0–70)
ANION GAP SERPL CALCULATED.3IONS-SCNC: 9 MMOL/L (ref 3–14)
AST SERPL W P-5'-P-CCNC: 20 U/L (ref 0–45)
BILIRUB SERPL-MCNC: 0.9 MG/DL (ref 0.2–1.3)
BUN SERPL-MCNC: 70 MG/DL (ref 7–30)
CALCIUM SERPL-MCNC: 8.4 MG/DL (ref 8.5–10.1)
CHLORIDE BLD-SCNC: 99 MMOL/L (ref 94–109)
CO2 SERPL-SCNC: 28 MMOL/L (ref 20–32)
CREAT SERPL-MCNC: 2.64 MG/DL (ref 0.66–1.25)
ERYTHROCYTE [DISTWIDTH] IN BLOOD BY AUTOMATED COUNT: 19 % (ref 10–15)
GFR SERPL CREATININE-BSD FRML MDRD: 27 ML/MIN/1.73M2
GLUCOSE BLD-MCNC: 203 MG/DL (ref 70–99)
GLUCOSE BLDC GLUCOMTR-MCNC: 155 MG/DL (ref 70–99)
GLUCOSE BLDC GLUCOMTR-MCNC: 192 MG/DL (ref 70–99)
GLUCOSE BLDC GLUCOMTR-MCNC: 193 MG/DL (ref 70–99)
GLUCOSE BLDC GLUCOMTR-MCNC: 203 MG/DL (ref 70–99)
GLUCOSE BLDC GLUCOMTR-MCNC: 208 MG/DL (ref 70–99)
GLUCOSE BLDC GLUCOMTR-MCNC: 238 MG/DL (ref 70–99)
GLUCOSE BLDC GLUCOMTR-MCNC: 255 MG/DL (ref 70–99)
HCT VFR BLD AUTO: 30 % (ref 40–53)
HGB BLD-MCNC: 9 G/DL (ref 13.3–17.7)
MCH RBC QN AUTO: 34.5 PG (ref 26.5–33)
MCHC RBC AUTO-ENTMCNC: 30 G/DL (ref 31.5–36.5)
MCV RBC AUTO: 115 FL (ref 78–100)
PLATELET # BLD AUTO: 76 10E3/UL (ref 150–450)
POTASSIUM BLD-SCNC: 3.6 MMOL/L (ref 3.4–5.3)
PROT SERPL-MCNC: 5.7 G/DL (ref 6.8–8.8)
RBC # BLD AUTO: 2.61 10E6/UL (ref 4.4–5.9)
SODIUM SERPL-SCNC: 136 MMOL/L (ref 133–144)
WBC # BLD AUTO: 7.5 10E3/UL (ref 4–11)

## 2022-12-09 PROCEDURE — 250N000009 HC RX 250: Performed by: INTERNAL MEDICINE

## 2022-12-09 PROCEDURE — 250N000013 HC RX MED GY IP 250 OP 250 PS 637: Performed by: PHYSICIAN ASSISTANT

## 2022-12-09 PROCEDURE — 999N000157 HC STATISTIC RCP TIME EA 10 MIN

## 2022-12-09 PROCEDURE — 200N000001 HC R&B ICU

## 2022-12-09 PROCEDURE — 250N000009 HC RX 250: Performed by: PHYSICIAN ASSISTANT

## 2022-12-09 PROCEDURE — 250N000011 HC RX IP 250 OP 636: Performed by: INTERNAL MEDICINE

## 2022-12-09 PROCEDURE — 250N000013 HC RX MED GY IP 250 OP 250 PS 637: Performed by: STUDENT IN AN ORGANIZED HEALTH CARE EDUCATION/TRAINING PROGRAM

## 2022-12-09 PROCEDURE — 94003 VENT MGMT INPAT SUBQ DAY: CPT

## 2022-12-09 PROCEDURE — 99232 SBSQ HOSP IP/OBS MODERATE 35: CPT | Performed by: INTERNAL MEDICINE

## 2022-12-09 PROCEDURE — 250N000009 HC RX 250: Performed by: NURSE PRACTITIONER

## 2022-12-09 PROCEDURE — 250N000012 HC RX MED GY IP 250 OP 636 PS 637: Performed by: NURSE PRACTITIONER

## 2022-12-09 PROCEDURE — 85027 COMPLETE CBC AUTOMATED: CPT | Performed by: INTERNAL MEDICINE

## 2022-12-09 PROCEDURE — 97530 THERAPEUTIC ACTIVITIES: CPT | Mod: GO | Performed by: OCCUPATIONAL THERAPIST

## 2022-12-09 PROCEDURE — 97530 THERAPEUTIC ACTIVITIES: CPT | Mod: GP | Performed by: PHYSICAL THERAPIST

## 2022-12-09 PROCEDURE — 97110 THERAPEUTIC EXERCISES: CPT | Mod: GP | Performed by: PHYSICAL THERAPIST

## 2022-12-09 PROCEDURE — 94640 AIRWAY INHALATION TREATMENT: CPT

## 2022-12-09 PROCEDURE — 94640 AIRWAY INHALATION TREATMENT: CPT | Mod: 76

## 2022-12-09 PROCEDURE — 94799 UNLISTED PULMONARY SVC/PX: CPT

## 2022-12-09 PROCEDURE — 80053 COMPREHEN METABOLIC PANEL: CPT | Performed by: INTERNAL MEDICINE

## 2022-12-09 PROCEDURE — 99291 CRITICAL CARE FIRST HOUR: CPT | Performed by: INTERNAL MEDICINE

## 2022-12-09 RX ORDER — ACETAMINOPHEN 160 MG
TABLET,DISINTEGRATING ORAL 2 TIMES DAILY PRN
Status: DISCONTINUED | OUTPATIENT
Start: 2022-12-09 | End: 2022-12-15 | Stop reason: HOSPADM

## 2022-12-09 RX ORDER — LACTULOSE 10 G/15ML
10 SOLUTION ORAL 2 TIMES DAILY
Status: DISCONTINUED | OUTPATIENT
Start: 2022-12-10 | End: 2022-12-10

## 2022-12-09 RX ADMIN — Medication 1 DROP: at 08:09

## 2022-12-09 RX ADMIN — ACETYLCYSTEINE 2 ML: 200 SOLUTION ORAL; RESPIRATORY (INHALATION) at 19:54

## 2022-12-09 RX ADMIN — INSULIN ASPART 3 UNITS: 100 INJECTION, SOLUTION INTRAVENOUS; SUBCUTANEOUS at 04:21

## 2022-12-09 RX ADMIN — CHLORHEXIDINE GLUCONATE 0.12% ORAL RINSE 15 ML: 1.2 LIQUID ORAL at 07:38

## 2022-12-09 RX ADMIN — APIXABAN 5 MG: 5 TABLET, FILM COATED ORAL at 20:35

## 2022-12-09 RX ADMIN — Medication 15 ML: at 08:09

## 2022-12-09 RX ADMIN — HYDROCORTISONE SODIUM SUCCINATE 25 MG: 100 INJECTION, POWDER, FOR SOLUTION INTRAMUSCULAR; INTRAVENOUS at 14:13

## 2022-12-09 RX ADMIN — FOLIC ACID 1 MG: 5 INJECTION, SOLUTION INTRAMUSCULAR; INTRAVENOUS; SUBCUTANEOUS at 08:24

## 2022-12-09 RX ADMIN — ACYCLOVIR: 50 OINTMENT TOPICAL at 14:13

## 2022-12-09 RX ADMIN — TACROLIMUS 1 MG: 5 CAPSULE ORAL at 21:49

## 2022-12-09 RX ADMIN — TACROLIMUS 1 MG: 5 CAPSULE ORAL at 08:24

## 2022-12-09 RX ADMIN — HYDROCORTISONE SODIUM SUCCINATE 25 MG: 100 INJECTION, POWDER, FOR SOLUTION INTRAMUSCULAR; INTRAVENOUS at 20:35

## 2022-12-09 RX ADMIN — INSULIN ASPART 5 UNITS: 100 INJECTION, SOLUTION INTRAVENOUS; SUBCUTANEOUS at 00:17

## 2022-12-09 RX ADMIN — IPRATROPIUM BROMIDE AND ALBUTEROL SULFATE 3 ML: .5; 3 SOLUTION RESPIRATORY (INHALATION) at 15:56

## 2022-12-09 RX ADMIN — ACYCLOVIR: 50 OINTMENT TOPICAL at 21:49

## 2022-12-09 RX ADMIN — ACETYLCYSTEINE 2 ML: 200 SOLUTION ORAL; RESPIRATORY (INHALATION) at 15:56

## 2022-12-09 RX ADMIN — INSULIN ASPART 3 UNITS: 100 INJECTION, SOLUTION INTRAVENOUS; SUBCUTANEOUS at 16:55

## 2022-12-09 RX ADMIN — Medication 40 MG: at 07:38

## 2022-12-09 RX ADMIN — APIXABAN 5 MG: 5 TABLET, FILM COATED ORAL at 08:09

## 2022-12-09 RX ADMIN — IPRATROPIUM BROMIDE AND ALBUTEROL SULFATE 3 ML: .5; 3 SOLUTION RESPIRATORY (INHALATION) at 08:05

## 2022-12-09 RX ADMIN — HYDROCORTISONE SODIUM SUCCINATE 25 MG: 100 INJECTION, POWDER, FOR SOLUTION INTRAMUSCULAR; INTRAVENOUS at 02:09

## 2022-12-09 RX ADMIN — HYDROGEN PEROXIDE: 30 LIQUID TOPICAL at 08:36

## 2022-12-09 RX ADMIN — INSULIN ASPART 3 UNITS: 100 INJECTION, SOLUTION INTRAVENOUS; SUBCUTANEOUS at 20:40

## 2022-12-09 RX ADMIN — CHLORHEXIDINE GLUCONATE 0.12% ORAL RINSE 15 ML: 1.2 LIQUID ORAL at 20:35

## 2022-12-09 RX ADMIN — IPRATROPIUM BROMIDE AND ALBUTEROL SULFATE 3 ML: .5; 3 SOLUTION RESPIRATORY (INHALATION) at 11:42

## 2022-12-09 RX ADMIN — INSULIN ASPART 2 UNITS: 100 INJECTION, SOLUTION INTRAVENOUS; SUBCUTANEOUS at 07:46

## 2022-12-09 RX ADMIN — ACETYLCYSTEINE 2 ML: 200 SOLUTION ORAL; RESPIRATORY (INHALATION) at 08:05

## 2022-12-09 RX ADMIN — HYDROCORTISONE SODIUM SUCCINATE 25 MG: 100 INJECTION, POWDER, FOR SOLUTION INTRAMUSCULAR; INTRAVENOUS at 07:38

## 2022-12-09 RX ADMIN — INSULIN ASPART 3 UNITS: 100 INJECTION, SOLUTION INTRAVENOUS; SUBCUTANEOUS at 11:26

## 2022-12-09 RX ADMIN — ACYCLOVIR: 50 OINTMENT TOPICAL at 06:54

## 2022-12-09 RX ADMIN — ACETYLCYSTEINE 2 ML: 200 SOLUTION ORAL; RESPIRATORY (INHALATION) at 11:43

## 2022-12-09 RX ADMIN — INSULIN ASPART 4 UNITS: 100 INJECTION, SOLUTION INTRAVENOUS; SUBCUTANEOUS at 23:34

## 2022-12-09 RX ADMIN — IPRATROPIUM BROMIDE AND ALBUTEROL SULFATE 3 ML: .5; 3 SOLUTION RESPIRATORY (INHALATION) at 19:54

## 2022-12-09 RX ADMIN — LACTULOSE 10 G: 20 SOLUTION ORAL at 08:10

## 2022-12-09 ASSESSMENT — ACTIVITIES OF DAILY LIVING (ADL)
ADLS_ACUITY_SCORE: 38
ADLS_ACUITY_SCORE: 38
ADLS_ACUITY_SCORE: 42
ADLS_ACUITY_SCORE: 38
ADLS_ACUITY_SCORE: 42
ADLS_ACUITY_SCORE: 40
ADLS_ACUITY_SCORE: 38
ADLS_ACUITY_SCORE: 40
ADLS_ACUITY_SCORE: 38
ADLS_ACUITY_SCORE: 42
ADLS_ACUITY_SCORE: 42
ADLS_ACUITY_SCORE: 38

## 2022-12-09 NOTE — PLAN OF CARE
Neuro: A&O. Follows commands. SOTO, 1-2/4. PERRLA.  CV: Normotensive. HR90's-100's.   Respiratory: LS coarse. Moderate tan sputum. Trached. CMV most of night. Now PS 8/5.  GI/: Anuric. On HD. Rectal tube patent. Minimal output.  Skin: Scattered bruising. Crusted lesions on mouth, nose.   Activity: BR, lift. Up to chair for 1 hr.  Diet: NPO/ TF at goal.   Drips: Levo currently off  Other:   Plan: Awaiting result of insurance appeal.

## 2022-12-09 NOTE — PROGRESS NOTES
Renal Medicine Progress Note            Assessment/Plan:     Blanco Osborne is a 57-year-old male with PMH CARRILLO s/p liver transplant (9/2016) and cirrhosis admitted 11/12/2022 with out of hospital PEA arrest and acute hypoxemic respiratory failure. Course complicated by parainfluenza virus, streptococcal bacteremia, and LORETTA requiring CRRT.         1.  Severe anuric LORETTA:               -CRRT stopped 11/22 and resume on 11/24 and stopped 11/26.               -IHD started 11/29/30. This week ran Sunday 12/4, 12/6 and today.                 2.  s/p Septic shock with MODS:                -no current pressors                  3.  Respiratory failure: current 30%  FIo2              -multiple re intubations               -R Pneumothorax               -Aspergillus PNA             4.  ESLD due to CARRILLO s/p liver transplant                - tacrolimus 1mg BID, dosing per GI     5.  Hypoalbuminemia      6. Anemia        Plan/Recs:  1) HD tomorrow, again continue attemting UF to remove inputs and further lower challenge weight/volume.   2) dispo to LTACH, continue HD there and monitor for recovery. No signs of urine output.        Torsten Montaño DO  [facility]  Office: [phone]  Cell: [phone]        Interval History:    s/p HD yesterday, has initial lower range BP's, levophed started to improve hemodynacmics, able to 2kg volume removed. Slowly improved bp's, now back off levophed.     24 hr I/O: 1.8L/2.1L.     PT sitting in chair, watching TV in no distress. Able to communicate, doses think his work of breathing improved with dialysis yesterday.             Medications and Allergies:       - MEDICATION INSTRUCTIONS for Dialysis Patients -   Does not apply See Admin Instructions     sodium chloride 0.9%  250 mL Intravenous Once in dialysis/CRRT     sodium chloride 0.9%  300 mL Hemodialysis Machine Once     acetylcysteine  2 mL Nebulization 4x Daily     acyclovir   Topical Q8H     apixaban ANTICOAGULANT  5 mg  Oral or Feeding Tube BID     artificial tears  1 drop Both Eyes TID     chlorhexidine  15 mL Mouth/Throat Q12H     folic acid  1 mg Intravenous Daily     sodium chloride (PF) 0.9%  10 mL Intracatheter Once in dialysis/CRRT    Followed by     heparin  1.3-2.6 mL Intracatheter Once in dialysis/CRRT     sodium chloride (PF) 0.9%  10 mL Intracatheter Once in dialysis/CRRT    Followed by     heparin  1.3-2.6 mL Intracatheter Once in dialysis/CRRT     heparin lock flush  5-20 mL Intracatheter Q24H     hydrocortisone sodium succinate PF  25 mg Intravenous Q6H     insulin aspart  1-12 Units Subcutaneous Q4H     insulin glargine  24 Units Subcutaneous QAM AC     ipratropium - albuterol 0.5 mg/2.5 mg/3 mL  3 mL Nebulization 4x daily     lactulose  10 g Oral or Feeding Tube TID     multivitamins w/minerals  15 mL Per Feeding Tube Daily     pantoprazole  40 mg Per Feeding Tube QAM AC    Or     pantoprazole  40 mg Intravenous QAM AC     sodium chloride (PF)  10-40 mL Intracatheter Q8H     tacrolimus  1 mg Oral or Feeding Tube BID      No Known Allergies         Physical Exam:   Vitals were reviewed  /68   Pulse 101   Temp 98.1  F (36.7  C) (Oral)   Resp 10   Ht 1.829 m (6')   Wt 85 kg (187 lb 6.3 oz)   SpO2 100%   BMI 25.41 kg/m      Wt Readings from Last 3 Encounters:   12/09/22 85 kg (187 lb 6.3 oz)   10/07/19 137.5 kg (303 lb 3.2 oz)   10/04/19 131.5 kg (290 lb)       Intake/Output Summary (Last 24 hours) at 12/9/2022 0939  Last data filed at 12/9/2022 0800  Gross per 24 hour   Intake 1523.13 ml   Output 2175 ml   Net -651.87 ml       GENERAL APPEARANCE: awake, alert.   HEENT:  Trach present  RESP: lungs clear ant/lat  CV: tachy, regular  ABDOMEN: o/s/nt/nd, bs present  EXTREMITIES/SKIN: no c/c/rashes/lesions; trace lower leg edema  LINES:  Right tunneled dialysis catheter present           Data:     BMP  Recent Labs   Lab 12/09/22  0745 12/09/22  0423 12/09/22  0420 12/09/22  0015 12/08/22  0814 12/08/22  0446  12/07/22  0454 12/07/22  0448 12/06/22  0520 12/06/22  0513   NA  --  136  --   --   --  137  --  138  --  136   POTASSIUM  --  3.6  --   --   --  3.9  --  3.6  --  3.8   CHLORIDE  --  99  --   --   --  100  --  101  --  98   KELLY  --  8.4*  --   --   --  7.8*  --  8.2*  --  7.9*   CO2  --  28  --   --   --  29  --  27  --  25   BUN  --  70*  --   --   --  84*  --  57*  --  95*   CR  --  2.64*  --   --   --  3.51*  --  2.51*  --  3.80*   * 203* 192* 255*   < > 183*   < > 190*   < > 178*    < > = values in this interval not displayed.     CBC  Recent Labs   Lab 12/09/22  0423 12/08/22  2157 12/08/22  0446 12/07/22 0448   WBC 7.5 6.1 4.6 5.0   HGB 9.0* 7.9* 7.8* 8.0*   HCT 30.0* 26.1* 26.2* 26.0*   * 114* 115* 113*   PLT 76* 60* 49* 53*     Lab Results   Component Value Date    AST 20 12/09/2022    ALT 20 12/09/2022    ALKPHOS 301 (H) 12/09/2022    BILITOTAL 0.9 12/09/2022    CHANDLER 76 (H) 11/30/2022     Lab Results   Component Value Date    INR 1.16 (H) 12/06/2022       Attestation:  I have reviewed today's vital signs, notes, medications, labs and imaging.    DO [facility] - Nephrology  Office: [phone]  Cell: [phone]

## 2022-12-09 NOTE — PROGRESS NOTES
Pt refusing pulse oximetry. Pt educated on importance of oxygen saturation monitoring in the setting of being on a ventilator and having a tracheostomy. Pt emoted understanding as pt is unable to speak d/t trach, continued to decline pulse oximetry.    Addendum 0345: Pt now agreeable to wear pulse ox.

## 2022-12-09 NOTE — PROGRESS NOTES
CLINICAL NUTRITION SERVICES - REASSESSMENT NOTE      Recommendations Ordered by Registered Dietitian (RD): Increase TF to Novasource Renal at 60 mL/hr= 2880 kcal (35 kcal/kg), 131 g protein (1.6 g/kg), 264 g CHO, 0 g fiber, 1032 mL H2O   Malnutrition: % Weight Loss:  > 5% in 1 month (severe malnutrition)  % Intake:  No decreased intake noted  Subcutaneous Fat Loss:  Orbital region severe depletion, Upper arm region severe depletion and Thoracic region severe depletion  Muscle Loss:  Temporal region severe depletion, Clavicle bone region severe depletion, Patellar region severe depletion, Anterior thigh region severe depletion and Posterior calf region severe depletion  Fluid Retention:  None noted    Malnutrition Diagnosis: Severe malnutrition  In Context of:  Acute illness or injury       EVALUATION OF PROGRESS TOWARD GOALS   Diet:  NPO with Trach     Nutrition Support:  Patient continues on goal TF regimen as follows ~    Nutrition Support Enteral:  Type of Feeding Tube: 14 Wallisian G-tube   Enteral Frequency:  Continuous  Enteral Regimen: Novasource Renal at 55 mL/hr  Total Enteral Provisions: 2640 kcal (32 kcal/kg), 120 g protein (1.4 g/kg), 242 g CHO, 0 g fiber, 1319 mL H2O   Free Water Flush: 30 mL every 4 hours     Intake/Tolerance:    K normal  BUN 70 (H), Cr 2.64 (H) - LORETTA  -255 with High sliding scale insulin and Lantus 24 units in am   Stool 175 mL  I/O 1503/2175, wt 85 kg (up slightly from dosing wt but down 16 kg (16%) from admit)      ASSESSED NUTRITION NEEDS:  Dosing Weight:  83.3 kg (12/7)  Energy Needs: 6945-1866 kcals (30-35 Kcal/Kg): repletion      Protein Needs: 125-165 grams protein (1.5-2 g pro/Kg): hypercatabolism with critical illness and repletion        NEW FINDINGS:   12/6: Thoracentesis - 200 mL   12/6: WOCN - Right lateral nose 11/16 - Wound type: Pressure Injury and Viral Lesions (herpetic). Other herpetic lesions on chip and lip. PI on buttock 'healed' ---> Continues on Certavite  daily per Pressure Injury Protocol     Norepi off 12/6    Potential for LTACH today     Previous Goals (12/5):   TF regimen continues to meet % needs   Evaluation: Met, however may benefit from slight increase in TF rate d/t significant weight loss     Previous Nutrition Diagnosis (12/5):   No nutrition diagnosis identified at this time   Evaluation: Changed       MALNUTRITION  % Weight Loss:  > 5% in 1 month (severe malnutrition)  % Intake:  No decreased intake noted  Subcutaneous Fat Loss:  Orbital region severe depletion, Upper arm region severe depletion and Thoracic region severe depletion  Muscle Loss:  Temporal region severe depletion, Clavicle bone region severe depletion, Patellar region severe depletion, Anterior thigh region severe depletion and Posterior calf region severe depletion  Fluid Retention:  None noted    Malnutrition Diagnosis: Severe malnutrition  In Context of:  Acute illness or injury    CURRENT NUTRITION DIAGNOSIS  Malnutrition related to acute illness process as evidenced by muscle, fat loss, with coding for severe malnutrition     INTERVENTIONS  Recommendations / Nutrition Prescription  Increase TF to [facility] at 60 mL/hr= 2880 kcal (35 kcal/kg), 131 g protein (1.6 g/kg), 264 g CHO, 0 g fiber, 1032 mL H2O    Implementation  EN Composition:  TF increase written as above   Collaboration and Referral of Nutrition care:  Patient discussed today during interdisciplinary bedside rounds     Goals  TF will meet % needs   No further weight loss beyond 83 kg     MONITORING AND EVALUATION:  Progress towards goals will be monitored and evaluated per protocol and Practice Guidelines    Martha Sandoval RD, LD, CNSC   Clinical Dietitian - [facility]

## 2022-12-09 NOTE — PROGRESS NOTES
[facility] ICU RESPIRATORY NOTE           Date of Admission: 11/12/2022     Date of Intubation (most recent): 11/26/2022    Reason for Mechanical Ventilation: Respiratory  failure      Number of Days on Mechanical Ventilation: 13     Met Criteria for Spontaneous Breathing Trial: Yes on PS 8/5 since 0418      Significant event today :None     ABG Results:   Recent Labs   Lab 12/05/22  2251   O2PER 40     Current Vent Settings:  Vent Mode: (S) CPAP/PS  (Continuous positive airway pressure with Pressure Support)  FiO2 (%): 30 %  Resp Rate (Set): 16 breaths/min  Tidal Volume (Set, mL): 30 mL  PEEP (cm H2O): 5 cmH2O  Pressure Support (cm H2O): 8 cmH2O  Resp: 15       Skin Assessment : oozing secretion, drainage and skin discoloration      Plan: Continue full ventilatory support and wean as tolerated.     Traci Kwong, RT

## 2022-12-09 NOTE — PROGRESS NOTES
Major Events this shift 2662-2167  HD run this AM. Lethargic this PM.   Hypotensive during dialysis, then throughout the afternoon. Norepinephrine infusion on/off throughout day. See MAR.   Up to chair this PM only tolerated for 1 hr. Also sat at E[facility] with therapy - see their note.   Full vent support throughout day, no trach dome.     Summary:   Lethargic after dialysis. Denies pain. SOTO, very weak and deconditioned.   Tele SR/ST. Intermittently hypotensive, see MAR.   40% PEEP 7. Moderate tan secretions.   Tolerating TF. Liquid stool, flexiseal in place. Anuric.   Wife at bedside and attentive to patient.     Plan to continue to progress towards goal of leaving to LTACH.

## 2022-12-09 NOTE — PROGRESS NOTES
Care Management Follow Up    Length of Stay (days): 27    Expected Discharge Date: 12/09/2022     Concerns to be Addressed: discharge planning     Patient plan of care discussed at interdisciplinary rounds: Yes    Anticipated Discharge Disposition:  (LTACH)     Anticipated Discharge Services: Transportation Services (stretcher)  Anticipated Discharge DME: Oxygen (ventilator)    Patient/family educated on Medicare website which has current facility and service quality ratings: yes  Education Provided on the Discharge Plan:    Patient/Family in Agreement with the Plan: yes    Referrals Placed by CM/SW: Post Acute Facilities  Private pay costs discussed: insurance costs TBD    Additional Information:  Received call from Aleah [facility]  ([phone]) who is following for discharge planning.  Explained we are waiting on authorization. She stated she does not have anything to do with authorization, appeals or denials. She did not have other solutions for discharge plan (other than LTACH)    CC has been in touch with [facility] admissions coordinator who is following for admission.  She is working with [facility] and hopeful to get authorization on appeal.  At this point we will keep 2pm Stretcher ride.     Addendum 1300:  Update from Nguyen that she is still waiting for authorization, she is still hopeful it could be obtained today.  Stretcher Ride w/ [facility] moved to 4:45pm.  Bedside Nurse updated.     Addendum 1500:  Authorization not yet obtained.  Nguyen feels it would be best to cancel discharge today.  Unable to do weekend admission without authorization.  health ride canceled.  Bedside RN, Pt, and wife updated.  Plan Monday transport.  She suggests [address] Ride. CC on weekend/Monday will need to arrange stretcher transport w/ vent.           Alethea Mclean, RN

## 2022-12-09 NOTE — PLAN OF CARE
Neuro: A&O. Follows commands. PERRLA. Generalized weakness.  CV: Normotensive. SR.  Respiratory: LS coarse. Moderate tan sputum. Trached, on trach dome.  GI/: Anuric. On HD. Rectal tube patent. Minimal output.  Skin: Scattered bruising. Crusted lesions on mouth, nose. Skin tear to chest and LUE. Blanchable redness to coccyx.  Activity: BR, lift. Up to chair x3hrs  Diet: NPO/ TF at goal.   Drips: na  Other:   Plan: Tentative transfer to [place] at 1645 pending  insurance appeal.

## 2022-12-10 ENCOUNTER — APPOINTMENT (OUTPATIENT)
Dept: GENERAL RADIOLOGY | Facility: CLINIC | Age: 57
DRG: 004 | End: 2022-12-10
Attending: STUDENT IN AN ORGANIZED HEALTH CARE EDUCATION/TRAINING PROGRAM
Payer: COMMERCIAL

## 2022-12-10 ENCOUNTER — APPOINTMENT (OUTPATIENT)
Dept: CT IMAGING | Facility: CLINIC | Age: 57
DRG: 004 | End: 2022-12-10
Attending: STUDENT IN AN ORGANIZED HEALTH CARE EDUCATION/TRAINING PROGRAM
Payer: COMMERCIAL

## 2022-12-10 ENCOUNTER — APPOINTMENT (OUTPATIENT)
Dept: PHYSICAL THERAPY | Facility: CLINIC | Age: 57
DRG: 004 | End: 2022-12-10
Attending: INTERNAL MEDICINE
Payer: COMMERCIAL

## 2022-12-10 LAB
ANION GAP SERPL CALCULATED.3IONS-SCNC: 11 MMOL/L (ref 3–14)
ANION GAP SERPL CALCULATED.3IONS-SCNC: 7 MMOL/L (ref 3–14)
BASE EXCESS BLDV CALC-SCNC: 3.2 MMOL/L (ref -7.7–1.9)
BUN SERPL-MCNC: 110 MG/DL (ref 7–30)
BUN SERPL-MCNC: 64 MG/DL (ref 7–30)
CALCIUM SERPL-MCNC: 7.3 MG/DL (ref 8.5–10.1)
CALCIUM SERPL-MCNC: 8.1 MG/DL (ref 8.5–10.1)
CHLORIDE BLD-SCNC: 103 MMOL/L (ref 94–109)
CHLORIDE BLD-SCNC: 98 MMOL/L (ref 94–109)
CO2 SERPL-SCNC: 26 MMOL/L (ref 20–32)
CO2 SERPL-SCNC: 28 MMOL/L (ref 20–32)
CREAT SERPL-MCNC: 2.24 MG/DL (ref 0.66–1.25)
CREAT SERPL-MCNC: 3.6 MG/DL (ref 0.66–1.25)
ERYTHROCYTE [DISTWIDTH] IN BLOOD BY AUTOMATED COUNT: 18.3 % (ref 10–15)
GFR SERPL CREATININE-BSD FRML MDRD: 19 ML/MIN/1.73M2
GFR SERPL CREATININE-BSD FRML MDRD: 33 ML/MIN/1.73M2
GLUCOSE BLD-MCNC: 166 MG/DL (ref 70–99)
GLUCOSE BLD-MCNC: 256 MG/DL (ref 70–99)
GLUCOSE BLDC GLUCOMTR-MCNC: 137 MG/DL (ref 70–99)
GLUCOSE BLDC GLUCOMTR-MCNC: 153 MG/DL (ref 70–99)
GLUCOSE BLDC GLUCOMTR-MCNC: 154 MG/DL (ref 70–99)
GLUCOSE BLDC GLUCOMTR-MCNC: 164 MG/DL (ref 70–99)
GLUCOSE BLDC GLUCOMTR-MCNC: 229 MG/DL (ref 70–99)
HCO3 BLDV-SCNC: 28 MMOL/L (ref 21–28)
HCT VFR BLD AUTO: 27.8 % (ref 40–53)
HGB BLD-MCNC: 8.7 G/DL (ref 13.3–17.7)
MCH RBC QN AUTO: 34.1 PG (ref 26.5–33)
MCHC RBC AUTO-ENTMCNC: 31.3 G/DL (ref 31.5–36.5)
MCV RBC AUTO: 109 FL (ref 78–100)
O2/TOTAL GAS SETTING VFR VENT: 30 %
PCO2 BLDV: 44 MM HG (ref 40–50)
PH BLDV: 7.41 [PH] (ref 7.32–7.43)
PLATELET # BLD AUTO: 70 10E3/UL (ref 150–450)
PO2 BLDV: 43 MM HG (ref 25–47)
POTASSIUM BLD-SCNC: 3.5 MMOL/L (ref 3.4–5.3)
POTASSIUM BLD-SCNC: 4.2 MMOL/L (ref 3.4–5.3)
RBC # BLD AUTO: 2.55 10E6/UL (ref 4.4–5.9)
SODIUM SERPL-SCNC: 135 MMOL/L (ref 133–144)
SODIUM SERPL-SCNC: 138 MMOL/L (ref 133–144)
TACROLIMUS BLD-MCNC: 4.3 UG/L (ref 5–15)
TME LAST DOSE: ABNORMAL H
TME LAST DOSE: ABNORMAL H
WBC # BLD AUTO: 6.4 10E3/UL (ref 4–11)

## 2022-12-10 PROCEDURE — 250N000013 HC RX MED GY IP 250 OP 250 PS 637: Performed by: PHYSICIAN ASSISTANT

## 2022-12-10 PROCEDURE — 250N000011 HC RX IP 250 OP 636: Performed by: INTERNAL MEDICINE

## 2022-12-10 PROCEDURE — 94003 VENT MGMT INPAT SUBQ DAY: CPT

## 2022-12-10 PROCEDURE — 99233 SBSQ HOSP IP/OBS HIGH 50: CPT | Performed by: STUDENT IN AN ORGANIZED HEALTH CARE EDUCATION/TRAINING PROGRAM

## 2022-12-10 PROCEDURE — 258N000003 HC RX IP 258 OP 636: Performed by: INTERNAL MEDICINE

## 2022-12-10 PROCEDURE — 200N000001 HC R&B ICU

## 2022-12-10 PROCEDURE — 90937 HEMODIALYSIS REPEATED EVAL: CPT

## 2022-12-10 PROCEDURE — 82803 BLOOD GASES ANY COMBINATION: CPT | Performed by: INTERNAL MEDICINE

## 2022-12-10 PROCEDURE — 80197 ASSAY OF TACROLIMUS: CPT | Performed by: INTERNAL MEDICINE

## 2022-12-10 PROCEDURE — 86706 HEP B SURFACE ANTIBODY: CPT | Performed by: INTERNAL MEDICINE

## 2022-12-10 PROCEDURE — 71250 CT THORAX DX C-: CPT

## 2022-12-10 PROCEDURE — 999N000065 XR CHEST PORT 1 VIEW

## 2022-12-10 PROCEDURE — 85027 COMPLETE CBC AUTOMATED: CPT | Performed by: INTERNAL MEDICINE

## 2022-12-10 PROCEDURE — 250N000013 HC RX MED GY IP 250 OP 250 PS 637: Performed by: STUDENT IN AN ORGANIZED HEALTH CARE EDUCATION/TRAINING PROGRAM

## 2022-12-10 PROCEDURE — 999N000157 HC STATISTIC RCP TIME EA 10 MIN

## 2022-12-10 PROCEDURE — 94640 AIRWAY INHALATION TREATMENT: CPT | Mod: 76

## 2022-12-10 PROCEDURE — 97530 THERAPEUTIC ACTIVITIES: CPT | Mod: GP | Performed by: PHYSICAL THERAPIST

## 2022-12-10 PROCEDURE — 82310 ASSAY OF CALCIUM: CPT | Performed by: INTERNAL MEDICINE

## 2022-12-10 PROCEDURE — 80048 BASIC METABOLIC PNL TOTAL CA: CPT | Performed by: INTERNAL MEDICINE

## 2022-12-10 PROCEDURE — 250N000012 HC RX MED GY IP 250 OP 636 PS 637: Performed by: NURSE PRACTITIONER

## 2022-12-10 PROCEDURE — 250N000009 HC RX 250: Performed by: INTERNAL MEDICINE

## 2022-12-10 PROCEDURE — 250N000013 HC RX MED GY IP 250 OP 250 PS 637: Performed by: INTERNAL MEDICINE

## 2022-12-10 PROCEDURE — 87340 HEPATITIS B SURFACE AG IA: CPT | Performed by: INTERNAL MEDICINE

## 2022-12-10 PROCEDURE — 250N000009 HC RX 250: Performed by: NURSE PRACTITIONER

## 2022-12-10 PROCEDURE — 99231 SBSQ HOSP IP/OBS SF/LOW 25: CPT | Performed by: INTERNAL MEDICINE

## 2022-12-10 PROCEDURE — 94640 AIRWAY INHALATION TREATMENT: CPT

## 2022-12-10 PROCEDURE — 250N000011 HC RX IP 250 OP 636: Performed by: STUDENT IN AN ORGANIZED HEALTH CARE EDUCATION/TRAINING PROGRAM

## 2022-12-10 PROCEDURE — 99207 PR NO BILLABLE SERVICE THIS VISIT: CPT | Performed by: INTERNAL MEDICINE

## 2022-12-10 PROCEDURE — 71045 X-RAY EXAM CHEST 1 VIEW: CPT

## 2022-12-10 PROCEDURE — 71045 X-RAY EXAM CHEST 1 VIEW: CPT | Mod: 76

## 2022-12-10 PROCEDURE — 250N000009 HC RX 250: Performed by: PHYSICIAN ASSISTANT

## 2022-12-10 RX ORDER — DESMOPRESSIN ACETATE 4 UG/ML
30 INJECTION, SOLUTION INTRAVENOUS; SUBCUTANEOUS ONCE
Status: COMPLETED | OUTPATIENT
Start: 2022-12-10 | End: 2022-12-10

## 2022-12-10 RX ORDER — FENTANYL CITRATE 50 UG/ML
50 INJECTION, SOLUTION INTRAMUSCULAR; INTRAVENOUS ONCE
Status: COMPLETED | OUTPATIENT
Start: 2022-12-10 | End: 2022-12-10

## 2022-12-10 RX ORDER — LACTULOSE 10 G/15ML
20 SOLUTION ORAL 3 TIMES DAILY
Status: DISCONTINUED | OUTPATIENT
Start: 2022-12-11 | End: 2022-12-15 | Stop reason: HOSPADM

## 2022-12-10 RX ORDER — MIDODRINE HYDROCHLORIDE 5 MG/1
5 TABLET ORAL
Status: DISCONTINUED | OUTPATIENT
Start: 2022-12-10 | End: 2022-12-13

## 2022-12-10 RX ADMIN — INSULIN ASPART 1 UNITS: 100 INJECTION, SOLUTION INTRAVENOUS; SUBCUTANEOUS at 20:25

## 2022-12-10 RX ADMIN — Medication 1 DROP: at 08:10

## 2022-12-10 RX ADMIN — APIXABAN 5 MG: 5 TABLET, FILM COATED ORAL at 08:10

## 2022-12-10 RX ADMIN — SODIUM CHLORIDE 300 ML: 9 INJECTION, SOLUTION INTRAVENOUS at 12:30

## 2022-12-10 RX ADMIN — ACETYLCYSTEINE 2 ML: 200 SOLUTION ORAL; RESPIRATORY (INHALATION) at 08:41

## 2022-12-10 RX ADMIN — Medication 0.05 MCG/KG/MIN: at 03:10

## 2022-12-10 RX ADMIN — SODIUM CHLORIDE 250 ML: 9 INJECTION, SOLUTION INTRAVENOUS at 14:16

## 2022-12-10 RX ADMIN — FENTANYL CITRATE 50 MCG: 50 INJECTION, SOLUTION INTRAMUSCULAR; INTRAVENOUS at 17:09

## 2022-12-10 RX ADMIN — ACYCLOVIR: 50 OINTMENT TOPICAL at 05:53

## 2022-12-10 RX ADMIN — IPRATROPIUM BROMIDE AND ALBUTEROL SULFATE 3 ML: .5; 3 SOLUTION RESPIRATORY (INHALATION) at 08:41

## 2022-12-10 RX ADMIN — Medication 15 ML: at 08:16

## 2022-12-10 RX ADMIN — TACROLIMUS 1 MG: 5 CAPSULE ORAL at 10:13

## 2022-12-10 RX ADMIN — INSULIN ASPART 1 UNITS: 100 INJECTION, SOLUTION INTRAVENOUS; SUBCUTANEOUS at 04:27

## 2022-12-10 RX ADMIN — Medication 40 MG: at 08:16

## 2022-12-10 RX ADMIN — TACROLIMUS 1 MG: 5 CAPSULE ORAL at 20:35

## 2022-12-10 RX ADMIN — ACETYLCYSTEINE 2 ML: 200 SOLUTION ORAL; RESPIRATORY (INHALATION) at 19:21

## 2022-12-10 RX ADMIN — HYDROCORTISONE SODIUM SUCCINATE 25 MG: 100 INJECTION, POWDER, FOR SOLUTION INTRAMUSCULAR; INTRAVENOUS at 08:11

## 2022-12-10 RX ADMIN — Medication 0.08 MCG/KG/MIN: at 20:00

## 2022-12-10 RX ADMIN — ACETYLCYSTEINE 2 ML: 200 SOLUTION ORAL; RESPIRATORY (INHALATION) at 16:05

## 2022-12-10 RX ADMIN — HEPARIN SODIUM 1900 UNITS: 1000 INJECTION INTRAVENOUS; SUBCUTANEOUS at 14:17

## 2022-12-10 RX ADMIN — DESMOPRESSIN ACETATE 30 MCG: 4 INJECTION, SOLUTION INTRAVENOUS; SUBCUTANEOUS at 16:11

## 2022-12-10 RX ADMIN — LACTULOSE 10 G: 10 SOLUTION ORAL at 08:16

## 2022-12-10 RX ADMIN — MIDODRINE HYDROCHLORIDE 5 MG: 5 TABLET ORAL at 18:14

## 2022-12-10 RX ADMIN — CHLORHEXIDINE GLUCONATE 0.12% ORAL RINSE 15 ML: 1.2 LIQUID ORAL at 20:25

## 2022-12-10 RX ADMIN — ACYCLOVIR: 50 OINTMENT TOPICAL at 22:39

## 2022-12-10 RX ADMIN — ACETYLCYSTEINE 2 ML: 200 SOLUTION ORAL; RESPIRATORY (INHALATION) at 12:36

## 2022-12-10 RX ADMIN — IPRATROPIUM BROMIDE AND ALBUTEROL SULFATE 3 ML: .5; 3 SOLUTION RESPIRATORY (INHALATION) at 19:21

## 2022-12-10 RX ADMIN — IPRATROPIUM BROMIDE AND ALBUTEROL SULFATE 3 ML: .5; 3 SOLUTION RESPIRATORY (INHALATION) at 16:05

## 2022-12-10 RX ADMIN — INSULIN ASPART 1 UNITS: 100 INJECTION, SOLUTION INTRAVENOUS; SUBCUTANEOUS at 08:11

## 2022-12-10 RX ADMIN — ACYCLOVIR: 50 OINTMENT TOPICAL at 13:34

## 2022-12-10 RX ADMIN — HYDROCORTISONE SODIUM SUCCINATE 25 MG: 100 INJECTION, POWDER, FOR SOLUTION INTRAMUSCULAR; INTRAVENOUS at 13:33

## 2022-12-10 RX ADMIN — FOLIC ACID 1 MG: 5 INJECTION, SOLUTION INTRAMUSCULAR; INTRAVENOUS; SUBCUTANEOUS at 08:11

## 2022-12-10 RX ADMIN — HYDROCORTISONE SODIUM SUCCINATE 25 MG: 100 INJECTION, POWDER, FOR SOLUTION INTRAMUSCULAR; INTRAVENOUS at 20:25

## 2022-12-10 RX ADMIN — INSULIN ASPART 4 UNITS: 100 INJECTION, SOLUTION INTRAVENOUS; SUBCUTANEOUS at 12:17

## 2022-12-10 RX ADMIN — FENTANYL CITRATE 50 MCG: 50 INJECTION, SOLUTION INTRAMUSCULAR; INTRAVENOUS at 16:28

## 2022-12-10 RX ADMIN — Medication 1 DROP: at 15:12

## 2022-12-10 RX ADMIN — INSULIN GLARGINE 30 UNITS: 100 INJECTION, SOLUTION SUBCUTANEOUS at 08:11

## 2022-12-10 RX ADMIN — MIDODRINE HYDROCHLORIDE 5 MG: 5 TABLET ORAL at 15:12

## 2022-12-10 RX ADMIN — LACTULOSE 10 G: 10 SOLUTION ORAL at 20:24

## 2022-12-10 RX ADMIN — CHLORHEXIDINE GLUCONATE 0.12% ORAL RINSE 15 ML: 1.2 LIQUID ORAL at 08:10

## 2022-12-10 RX ADMIN — HEPARIN SODIUM 1900 UNITS: 1000 INJECTION INTRAVENOUS; SUBCUTANEOUS at 14:18

## 2022-12-10 RX ADMIN — IPRATROPIUM BROMIDE AND ALBUTEROL SULFATE 3 ML: .5; 3 SOLUTION RESPIRATORY (INHALATION) at 12:35

## 2022-12-10 RX ADMIN — Medication 1 DROP: at 22:39

## 2022-12-10 RX ADMIN — HYDROCORTISONE SODIUM SUCCINATE 25 MG: 100 INJECTION, POWDER, FOR SOLUTION INTRAMUSCULAR; INTRAVENOUS at 02:18

## 2022-12-10 ASSESSMENT — ACTIVITIES OF DAILY LIVING (ADL)
ADLS_ACUITY_SCORE: 42
ADLS_ACUITY_SCORE: 42
ADLS_ACUITY_SCORE: 40
ADLS_ACUITY_SCORE: 40
ADLS_ACUITY_SCORE: 42
ADLS_ACUITY_SCORE: 42
ADLS_ACUITY_SCORE: 40
ADLS_ACUITY_SCORE: 42
ADLS_ACUITY_SCORE: 40
ADLS_ACUITY_SCORE: 42
ADLS_ACUITY_SCORE: 40
ADLS_ACUITY_SCORE: 42

## 2022-12-10 NOTE — PROGRESS NOTES
Potassium   Date Value Ref Range Status   12/10/2022 4.2 3.4 - 5.3 mmol/L Final   04/20/2020 3.9 3.4 - 5.3 mmol/L Final     Hemoglobin   Date Value Ref Range Status   12/10/2022 8.7 (L) 13.3 - 17.7 g/dL Final   04/20/2020 14.3 13.3 - 17.7 g/dL Final     Creatinine   Date Value Ref Range Status   12/10/2022 3.60 (H) 0.66 - 1.25 mg/dL Final   04/20/2020 1.06 0.66 - 1.25 mg/dL Final     Urea Nitrogen   Date Value Ref Range Status   12/10/2022 110 (H) 7 - 30 mg/dL Final   04/20/2020 23 7 - 30 mg/dL Final     Sodium   Date Value Ref Range Status   12/10/2022 135 133 - 144 mmol/L Final   04/20/2020 139 133 - 144 mmol/L Final     INR   Date Value Ref Range Status   12/06/2022 1.16 (H) 0.85 - 1.15 Final   10/07/2019 1.20 (H) 0.86 - 1.14 Final       DIALYSIS PROCEDURE NOTE  Hepatitis status of previous patient on machine log was checked and verified ok to use with this patients hepatitis status.  Patient dialyzed for 2.5hrs. (primary physician required stop 30 minutes early due STAT chest tube insertion). on a K3 bath with a net fluid removal of  2L. A BFR of 400ml/min was obtained via a Right internal jugular CVC catheter. The treatment plan was discussed with Dr. Montaño during the treatment.    Total heparin received during the treatment: 0 units.      Line flushed, clamped and capped with heparin 1:1000 1.9mL (1900 units) per lumen    Meds given: None prescribed  Complications: BFR reduced from prescribed 400mL/min to 350 to achieve AP in range, alarming at 400 long-term through treatment after 1.5 hours. Only AP alarming after lumen repositioned and checked a couple times. SBP hovering high 80s to low 90s long-term through dialysis, UF turned off, norepinephrine IV dose adjusted with request by ICU RN from .03mcg to .05mcg/min to support BP for 30 minutes then up to .07mcg/min. Bolus 200cc NS given after SBP dipped to 89 after these 30 minutes. SBP recovered after 1 hour of this incidence. Patient denied symptoms however  increase fatigue noted. Final UF of 0.95Litres with primary physician request to cut dialysis short by 30 minutes for STAT chest tube insertion regarding collapsed lung noted in chest xray.    Person educated: patient. Knowledge base unable to appropriately assess as patient mute with trach in-situ. Patient able to nod consistently he is agreeable to treatment and nod in agreement with details on today's treatment. Barriers to learning: Muted with trach but seems orientated with consistent answers . Educated on procedure and fluid management via oral mode. Patient verbalized understanding. Pt prefers verbal education style.     ICEBOAT? Timeout performed pre-treatment  I: Patient was identified using 2 identifiers  C:  Consent Signed given by patient nodding to receiving dialysis twice. Wife will sign when in.  E: Equipment preventative maintenance is current and dialysis delivery system OK to use  B: Hepatitis B Surface Antigen: Non-reactive; Draw Date: 11/14/22- redrawn labs today      Hepatitis B Surface Antibody: Susceptible; Draw Date: 11/14/22- redrawn labs today  O: Dialysis orders present and complete prior to treatment  A: Vascular access verified and assessed prior to treatment  T: Treatment was performed at a clinically appropriate time  ?: Patient was allowed to ask questions and address concerns prior to treatment  See Adult Hemodialysis flowsheet in [employer] for further details and post assessment.  Machine water alarm in place and functioning. Transducer pods intact and checked every 15min.   Pt treated in ICU bedroom.  Chlorine/Chloramine water system checked every 4 hours.  Outpatient Dialysis TBD    Patient repositioned every 2 hours during the treatment.  Post treatment report given to PETER Rueda regarding 0.95L of fluid removed, last BP of 105/69, and patient pain symptoms informed to RN as they occurred (see flowsheet for details).

## 2022-12-10 NOTE — PROGRESS NOTES
Renal Medicine Progress Note            Assessment/Plan:     Blanco Osborne is a 57-year-old male with PMH CARRILLO s/p liver transplant (9/2016) and cirrhosis admitted 11/12/2022 with out of hospital PEA arrest and acute hypoxemic respiratory failure. Course complicated by parainfluenza virus, streptococcal bacteremia, and LORETTA requiring CRRT.         1.  Severe anuric LORETTA:               -CRRT stopped 11/22 and resume on 11/24 and stopped 11/26.               -IHD started 11/29/30. This week ran Sunday 12/4, 12/6 and today.                 2.  s/p Septic shock with MODS:                -no current pressors                  3.  Respiratory failure: current 30%  FIo2              -multiple re intubations               -R Pneumothorax               -Aspergillus PNA             4.  ESLD due to CARRILLO s/p liver transplant                - tacrolimus 1mg BID, dosing per GI     5.  Hypoalbuminemia      6. Anemia        Plan/Recs:  1) HD today, again continue attemting UF to remove inputs and further lower challenge weight/volume.     Will run again 12/12 and get on MWF schedule   2) dispo to LTACH, continue HD there and monitor for recovery. No signs of urine output.      Torsten Montaño DO  [facility]  Office: [phone]  Cell: [phone]        Interval History:      Pt remains anuric. Spouse at bedside. No major events noted.            Medications and Allergies:       - MEDICATION INSTRUCTIONS for Dialysis Patients -   Does not apply See Admin Instructions     sodium chloride 0.9%  250 mL Intravenous Once in dialysis/CRRT     sodium chloride 0.9%  300 mL Hemodialysis Machine Once     acetylcysteine  2 mL Nebulization 4x Daily     acyclovir   Topical Q8H     apixaban ANTICOAGULANT  5 mg Oral or Feeding Tube BID     artificial tears  1 drop Both Eyes TID     chlorhexidine  15 mL Mouth/Throat Q12H     folic acid  1 mg Intravenous Daily     sodium chloride (PF) 0.9%  10 mL Intracatheter Once in dialysis/CRRT     Followed by     heparin  1.3-2.6 mL Intracatheter Once in dialysis/CRRT     sodium chloride (PF) 0.9%  10 mL Intracatheter Once in dialysis/CRRT    Followed by     heparin  1.3-2.6 mL Intracatheter Once in dialysis/CRRT     heparin lock flush  5-20 mL Intracatheter Q24H     hydrocortisone sodium succinate PF  25 mg Intravenous Q6H     insulin aspart  1-12 Units Subcutaneous Q4H     insulin glargine  30 Units Subcutaneous QAM AC     ipratropium - albuterol 0.5 mg/2.5 mg/3 mL  3 mL Nebulization 4x daily     lactulose  10 g Oral or Feeding Tube BID     multivitamins w/minerals  15 mL Per Feeding Tube Daily     pantoprazole  40 mg Per Feeding Tube QAM AC    Or     pantoprazole  40 mg Intravenous QAM AC     sodium chloride (PF)  10-40 mL Intracatheter Q8H     tacrolimus  1 mg Oral or Feeding Tube BID      No Known Allergies         Physical Exam:   Vitals were reviewed  /70   Pulse (!) 121   Temp 99.2  F (37.3  C) (Oral)   Resp 24   Ht 1.829 m (6')   Wt 84.5 kg (186 lb 4.6 oz)   SpO2 97%   BMI 25.27 kg/m      Wt Readings from Last 3 Encounters:   12/10/22 84.5 kg (186 lb 4.6 oz)   10/07/19 137.5 kg (303 lb 3.2 oz)   10/04/19 131.5 kg (290 lb)       Intake/Output Summary (Last 24 hours) at 12/10/2022 1048  Last data filed at 12/10/2022 0800  Gross per 24 hour   Intake 1568.46 ml   Output 0 ml   Net 1568.46 ml     GENERAL APPEARANCE: awake, alert.   HEENT:  Trach present  RESP: lungs clear ant/lat  CV: tachy, regular  ABDOMEN: slight distension, non-tender  EXTREMITIES/SKIN: no c/c/rashes/lesions; trace lower leg edema  LINES:  Right tunneled dialysis catheter present              Data:     BMP  Recent Labs   Lab 12/10/22  0757 12/10/22  0426 12/09/22  2334 12/09/22  2039 12/09/22  0745 12/09/22  0423 12/08/22  0814 12/08/22  0446 12/07/22  0454 12/07/22  0448 12/06/22  0520 12/06/22  0513   NA  --   --   --   --   --  136  --  137  --  138  --  136   POTASSIUM  --   --   --   --   --  3.6  --  3.9  --  3.6   --  3.8   CHLORIDE  --   --   --   --   --  99  --  100  --  101  --  98   KELLY  --   --   --   --   --  8.4*  --  7.8*  --  8.2*  --  7.9*   CO2  --   --   --   --   --  28  --  29  --  27  --  25   BUN  --   --   --   --   --  70*  --  84*  --  57*  --  95*   CR  --   --   --   --   --  2.64*  --  3.51*  --  2.51*  --  3.80*   * 164* 238* 203*   < > 203*   < > 183*   < > 190*   < > 178*    < > = values in this interval not displayed.     CBC  Recent Labs   Lab 12/09/22  0423 12/08/22  2157 12/08/22  0446 12/07/22  0448   WBC 7.5 6.1 4.6 5.0   HGB 9.0* 7.9* 7.8* 8.0*   HCT 30.0* 26.1* 26.2* 26.0*   * 114* 115* 113*   PLT 76* 60* 49* 53*     Lab Results   Component Value Date    AST 20 12/09/2022    ALT 20 12/09/2022    ALKPHOS 301 (H) 12/09/2022    BILITOTAL 0.9 12/09/2022    CHANDLER 76 (H) 11/30/2022     Lab Results   Component Value Date    INR 1.16 (H) 12/06/2022       Attestation:  I have reviewed today's vital signs, notes, medications, labs and imaging.    DO [facility] - Nephrology  Office: [phone]  Cell: [phone]

## 2022-12-10 NOTE — PROGRESS NOTES
[facility] [place] Intensive Care Unit  Intensivist Note  December 10, 2022      Blanco Osborne MRN# [id number]   Age: 57 year old YOB: 1965     Date of Admission: 11/12/2022         Last 24 hours:   Subjective: This morning complaining of right sided chest pain. No fever. Still on 0.03mcg of levophed.          Review of Systems:   All systems reviewed were negative with the exception of the ones listed above.          Objective data   Vitals: B/P: 104/65, T: 97.7, P: 113, R: 25  General: appears uncomfortable  HEENT: normocephalic, vesicles on his upper lip  Lungs: Decreased breath sounds diffusely  CVS: Regular rate and rhythm.   Abdomen: soft, non tender. Present BS  Extremities/musculoskeletal: no peripheral edema  Neurology: intubated and sedated  Skin: no rash  Psychiatry: Mood and affect are appropriate     Ventilation  Vent Mode: CMV/AC  (Continuous Mandatory Ventilation/ Assist Control)  FiO2 (%): 30 %  Resp Rate (Set): 16 breaths/min  Tidal Volume (Set, mL): 450 mL  PEEP (cm H2O): 5 cmH2O  Pressure Support (cm H2O): 8 cmH2O  Resp: 25    Recent Labs   Lab 12/05/22  2251   O2PER 40        Labs  Recent Labs   Lab 12/10/22  1017 12/09/22  0423 12/08/22  2157 12/08/22  0446   WBC 6.4 7.5 6.1 4.6   HGB 8.7* 9.0* 7.9* 7.8*   PLT 70* 76* 60* 49*     Recent Labs   Lab 12/10/22  1216 12/10/22  1017 12/10/22  0757 12/09/22  0745 12/09/22  0423 12/08/22  0814 12/08/22  0446   NA  --  135  --   --  136  --  137   POTASSIUM  --  4.2  --   --  3.6  --  3.9   CHLORIDE  --  98  --   --  99  --  100   CO2  --  26  --   --  28  --  29   BUN  --  110*  --   --  70*  --  84*   CR  --  3.60*  --   --  2.64*  --  3.51*   * 256* 154*   < > 203*   < > 183*    < > = values in this interval not displayed.     Recent Labs   Lab 12/09/22  0423   AST 20   ALT 20   BILITOTAL 0.9     Recent Labs   Lab 12/06/22  0513   INR 1.16*        Imaging reviewed by myself.          Medications:     Current  Facility-Administered Medications   Medication     - MEDICATION INSTRUCTIONS for Dialysis Patients -     0.9% sodium chloride BOLUS     0.9% sodium chloride BOLUS     0.9% sodium chloride BOLUS     0.9% sodium chloride BOLUS     0.9% sodium chloride BOLUS     0.9% sodium chloride BOLUS     acetylcysteine (MUCOMYST) 20 % nebulizer solution 2 mL     acyclovir (ZOVIRAX) 5 % ointment     albuterol (PROVENTIL) neb solution 2.5 mg     apixaban ANTICOAGULANT (ELIQUIS) tablet 5 mg     carboxymethylcellulose PF (REFRESH PLUS) 0.5 % ophthalmic solution 1 drop     chlorhexidine (PERIDEX) 0.12 % solution 15 mL     dextrose 10% infusion     glucose gel 15-30 g    Or     dextrose 50 % injection 25-50 mL    Or     glucagon injection 1 mg     folic acid injection 1 mg     sodium chloride 0.9% DIALYSIS Cath LOCK - RED Lumen    Followed by     heparin 1000 unit/mL DIALYSIS Cath LOCK - RED Lumen     sodium chloride 0.9% DIALYSIS Cath LOCK - RED Lumen    Followed by     heparin 1000 unit/mL DIALYSIS Cath LOCK - RED Lumen     sodium chloride 0.9% DIALYSIS Cath LOCK - BLUE Lumen    Followed by     heparin 1000 unit/mL DIALYSIS Cath LOCK -BLUE Lumen     sodium chloride 0.9% DIALYSIS Cath LOCK - BLUE Lumen    Followed by     heparin 1000 unit/mL DIALYSIS Cath LOCK -BLUE Lumen     heparin lock flush 10 UNIT/ML injection 5-20 mL     heparin lock flush 10 UNIT/ML injection 5-20 mL     hydrocortisone sodium succinate PF (solu-CORTEF) injection 25 mg     hydrogen peroxide 3 % solution     insulin aspart (NovoLOG) injection (RAPID ACTING)     insulin glargine (LANTUS PEN) injection 30 Units     ipratropium - albuterol 0.5 mg/2.5 mg/3 mL (DUONEB) neb solution 3 mL     lactulose (CHRONULAC) solution 10 g     medication instruction     Medication Instructions     midodrine (PROAMATINE) tablet 5 mg     multivitamins w/minerals liquid 15 mL     naloxone (NARCAN) injection 0.2 mg    Or     naloxone (NARCAN) injection 0.4 mg    Or     naloxone (NARCAN)  injection 0.2 mg    Or     naloxone (NARCAN) injection 0.4 mg     No heparin via hemodialysis machine     norepinephrine (LEVOPHED) 4 mg in  mL infusion PREMIX     ondansetron (ZOFRAN ODT) ODT tab 4 mg    Or     ondansetron (ZOFRAN) injection 4 mg     oxyCODONE (ROXICODONE) tablet 5 mg     pantoprazole (PROTONIX) 2 mg/mL suspension 40 mg    Or     pantoprazole (PROTONIX) IV push injection 40 mg     polyethylene glycol (MIRALAX) Packet 17 g     prochlorperazine (COMPAZINE) injection 10 mg    Or     prochlorperazine (COMPAZINE) tablet 10 mg    Or     prochlorperazine (COMPAZINE) suppository 25 mg     promethazine (PHENERGAN) syrup 25 mg     sodium chloride (PF) 0.9% PF flush 10-20 mL     sodium chloride (PF) 0.9% PF flush 10-20 mL     sodium chloride (PF) 0.9% PF flush 10-40 mL     sodium chloride 0.9% infusion     tacrolimus (GENERIC EQUIVALENT) suspension 1 mg           Assessment and plan     Blanco Osborne is a 57 year old male admitted on 11/12/2022 for respiratory failure. Plan for LTACH on Monday if able to.     Neurology and Psychiatry:   -alert and oriented.     Pulmonary:   -on 30% fio2. Was only able to trach dome for 1 hour today.   -normally tolerates trach dome for 12 hours  -repeat chest x-ray today    Cardiovascular system:   -low dose vasopressor. Start midodrine.   -eliquis for afib    Renal/Electrolytes:  -LORETTA requiring CRRT. HD today.     ID:  -strep and parainfleunza. Completed antibiotics.   -acyclovir ointment for hsv    GI/:  -Hx of liver transplant in 2016. Cont tacrolimus and solucortef    Endocrine:   -glucose goal 140-180    Heme/Onc:  -no acute issues    ICU check list:   1. Lines/tubes: PICC and cvc tunneled.   2. Glucose: 140-180  3. Skin: vesicles on his upper lip  4. Nixon: none  5. Nutrition: per G tube  6. DVT proph: eliquis  7. GI proph: PPI  8. Bowel movements: will talk with nurse.   9. Activity: PT.OT    Adiel Liu  MICU staff  Pager [phone]

## 2022-12-10 NOTE — PLAN OF CARE
Pt remains on vent overnight. Thick secretions from trach. One loose stool overnight. PEG sutures removed per order. Trach care completed overnight.

## 2022-12-10 NOTE — PROGRESS NOTES
[facility] ICU RESPIRATORY NOTE        Date of Admission: 11/12/2022    Date of Intubation (most recent): 11/26/22    Reason for Mechanical Ventilation: Respiratory failure     Number of Days on Mechanical Ventilation: 14    Met Criteria for Spontaneous Breathing Trial: Yes    Significant Events Today: HFTD 40% 30 LPM for one hour    ABG Results:   Recent Labs   Lab 12/05/22  2251   O2PER 40       Current Vent Settings: Vent Mode: CMV/AC  (Continuous Mandatory Ventilation/ Assist Control)  FiO2 (%): 30 %  Resp Rate (Set): 16 breaths/min  Tidal Volume (Set, mL): 450 mL  PEEP (cm H2O): 5 cmH2O  Pressure Support (cm H2O): 8 cmH2O  Resp: 18      Skin Assessment: Skin intact    Plan: Continue full vent support and wean as tolerated     RT Jc on 12/10/2022 at 4:32 PM

## 2022-12-10 NOTE — PROGRESS NOTES
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 Acute respiratory failure - he remains on 30% and +5 PEEP, trach in place and receiving pulmonary hygiene measures. He is tolerating about 12 hours of trach dome per day and will lengthen this as tolerated.    2. Pneumonia - strep and parainfluenza; He has completed antibiotics;  monitor closely.   3. ID- voriconazole off; completed 15 days of Rocephin  4. Shock - off vasopressors  5. Acute renal failure - HD  6. S/p initial arrest and prolonged resuscitation (11.9) and hypothermia resuscitation   7. History of Liver Transplant 2016 - tacrolimus at 1 mg bid and on solucortef. Will have Tx service reassess rejection meds. Will check tacrolimus level tomorrow.  8. History of HTN  9. CHRISTIAN on BIPAP when not on vent   10. Nutrition - enteral resumed via PEG   11. CNS- awake and following commands, long term CNS prognosis is likely favorable.  12. Atrial fibrillation, likely paroxysmal and with concern of prior CVA's will continue with apixaban at 5 BID   13. Platelets 76k and rising; follow only, and obvious explanation is not yet clear          Summary/Hospital Course:           Assessment and plan :     Blanco Osborne IS a 57 year old male  admitted on 11/12/2022 for acute respiratory failure.   I have personally reviewed the daily labs, imaging studies, cultures and discussed the case with referring physician and consulting physicians.     My assessment and plan by system for this patient is as follows:    Neurology/Psychiatry:   1. Awake; follows commands; weak throiughout    Cardiovascular:   1.Hemodynamics - compensated     Pulmonary/Ventilator Management:   1. Weaning slowly from vent     GI and Nutrition :   1. Enteral nutrition     Renal/Fluids/Electrolytes:   1.He remains on HD    Infectious Disease:   1. Off antibiotics     Endocrine:   1. Lantus increased     Hematology/Oncology:   1.  Hb 9     IV/Access:   1. Venous access -   2. Arterial access -   3.  Plan  - central access required and necessary      ICU Prophylaxis:   1. DVT: Hep Subq/ LMWH/mechanical  2. VAP: HOB 30 degrees, chlorhexidine rinse  3. Stress Ulcer: PPI/H2 blocker  4. Restraints: Nonviolent soft two point restraints required and necessary for patient safety and continued cares and good effect as patient continues to pull at necessary lines, tubes despite education and distraction. Will readdress daily.   5. IV Access - central access required and necessary for continued patient cares  6. Feeding - enteral nutrition  7. Family Update:  Discussed with wife at bedside   8. Disposition - ICU care         Key goals for next 24 hours:   1. Vent weaning   2.  3.               Interim History:              Key Medications:       - MEDICATION INSTRUCTIONS for Dialysis Patients -   Does not apply See Admin Instructions     sodium chloride 0.9%  250 mL Intravenous Once in dialysis/CRRT     sodium chloride 0.9%  300 mL Hemodialysis Machine Once     acetylcysteine  2 mL Nebulization 4x Daily     acyclovir   Topical Q8H     apixaban ANTICOAGULANT  5 mg Oral or Feeding Tube BID     artificial tears  1 drop Both Eyes TID     chlorhexidine  15 mL Mouth/Throat Q12H     folic acid  1 mg  Intravenous Daily     sodium chloride (PF) 0.9%  10 mL Intracatheter Once in dialysis/CRRT    Followed by     heparin  1.3-2.6 mL Intracatheter Once in dialysis/CRRT     sodium chloride (PF) 0.9%  10 mL Intracatheter Once in dialysis/CRRT    Followed by     heparin  1.3-2.6 mL Intracatheter Once in dialysis/CRRT     heparin lock flush  5-20 mL Intracatheter Q24H     hydrocortisone sodium succinate PF  25 mg Intravenous Q6H     insulin aspart  1-12 Units Subcutaneous Q4H     insulin glargine  24 Units Subcutaneous QAM AC     ipratropium - albuterol 0.5 mg/2.5 mg/3 mL  3 mL Nebulization 4x daily     [START ON 12/10/2022] lactulose  10 g Oral or Feeding Tube BID     multivitamins w/minerals  15 mL Per Feeding Tube Daily     pantoprazole  40 mg Per Feeding Tube QAM AC    Or     pantoprazole  40 mg Intravenous QAM AC     sodium chloride (PF)  10-40 mL Intracatheter Q8H     tacrolimus  1 mg Oral or Feeding Tube BID       dextrose       - MEDICATION INSTRUCTIONS -       - MEDICATION INSTRUCTIONS -       norepinephrine Stopped (12/09/22 0545)     sodium chloride 0 mL/hr at 11/22/22 0742               Physical Examination:   Temp:  [97.8  F (36.6  C)-98.1  F (36.7  C)] 98.1  F (36.7  C)  Pulse:  [] 99  Resp:  [10-43] 24  BP: ()/(52-97) 90/52  FiO2 (%):  [30 %-43 %] 43 %  SpO2:  [97 %-100 %] 100 %    Intake/Output Summary (Last 24 hours) at 12/9/2022 2122  Last data filed at 12/9/2022 1800  Gross per 24 hour   Intake 1258.35 ml   Output 50 ml   Net 1208.35 ml     Wt Readings from Last 4 Encounters:   12/09/22 85 kg (187 lb 6.3 oz)   10/07/19 137.5 kg (303 lb 3.2 oz)   10/04/19 131.5 kg (290 lb)   02/20/19 140.4 kg (309 lb 9.6 oz)     BP - Mean:  [] 62  Vent Mode: CMV/AC  (Continuous Mandatory Ventilation/ Assist Control)  FiO2 (%): 43 %  Resp Rate (Set): 16 breaths/min  Tidal Volume (Set, mL): 30 mL  PEEP (cm H2O): 5 cmH2O  Pressure Support (cm H2O): 8 cmH2O  Resp: 24    Recent Labs   Lab 12/05/22  5650    O2PER 40       GEN: no acute distress   HEENT: head ncat, sclera anicteric, OP patent, trachea midline   PULM: unlabored synchronous with vent, clear anteriorly    CV/COR: RRR S1S2 no gallop,  No rub, no murmur  ABD: soft nontender,   EXT:  Mod edema   warm  NEURO: grossly intact but weak  SKIN: no obvious rash; no cyanosis or mottling   LINES: clean, dry intact         Data:   All data and imaging reviewed     ROUTINE ICU LABS (Last four results)  CMP  Recent Labs   Lab 12/09/22  2039 12/09/22  1653 12/09/22  1124 12/09/22  0745 12/09/22  0423 12/08/22  0814 12/08/22  0446 12/07/22  0454 12/07/22  0448 12/06/22  0520 12/06/22  0513 12/05/22  0736 12/05/22  0440 12/03/22  0751 12/03/22  0513   NA  --   --   --   --  136  --  137  --  138  --  136  --   --    < > 137   POTASSIUM  --   --   --   --  3.6  --  3.9  --  3.6  --  3.8  --   --    < > 3.8   CHLORIDE  --   --   --   --  99  --  100  --  101  --  98  --   --    < > 102   CO2  --   --   --   --  28  --  29  --  27  --  25  --   --    < > 25   ANIONGAP  --   --   --   --  9  --  8  --  10  --  13  --   --    < > 10   * 193* 208* 155* 203*   < > 183*   < > 190*   < > 178*   < > 212*   < > 210*   BUN  --   --   --   --  70*  --  84*  --  57*  --  95*  --   --    < > 75*   CR  --   --   --   --  2.64*  --  3.51*  --  2.51*  --  3.80*  --   --    < > 3.14*   GFRESTIMATED  --   --   --   --  27*  --  19*  --  29*  --  18*  --   --    < > 22*   KELLY  --   --   --   --  8.4*  --  7.8*  --  8.2*  --  7.9*  --   --    < > 7.7*   PHOS  --   --   --   --   --   --  3.3  --  2.2*  --  4.3  --  3.7  --   --    PROTTOTAL  --   --   --   --  5.7*  --   --   --   --   --   --   --   --   --  5.1*   ALBUMIN  --   --   --   --  2.0*  --  1.9*  --  1.9*  --  1.9*  --   --   --  1.8*   BILITOTAL  --   --   --   --  0.9  --   --   --   --   --   --   --   --   --  1.0   ALKPHOS  --   --   --   --  301*  --   --   --   --   --   --   --   --   --  240*   AST  --   --   --    --  20  --   --   --   --   --   --   --   --   --  13   ALT  --   --   --   --  20  --   --   --   --   --   --   --   --   --  25    < > = values in this interval not displayed.     CBC  Recent Labs   Lab 12/09/22  0423 12/08/22  2157 12/08/22  0446 12/07/22  0448   WBC 7.5 6.1 4.6 5.0   RBC 2.61* 2.29* 2.28* 2.31*   HGB 9.0* 7.9* 7.8* 8.0*   HCT 30.0* 26.1* 26.2* 26.0*   * 114* 115* 113*   MCH 34.5* 34.5* 34.2* 34.6*   MCHC 30.0* 30.3* 29.8* 30.8*   RDW 19.0* 18.7* 18.7* 18.6*   PLT 76* 60* 49* 53*     INR  Recent Labs   Lab 12/06/22  0513   INR 1.16*     Arterial Blood Gas  Recent Labs   Lab 12/05/22  2251   O2PER 40       All cultures:  No results for input(s): CULT in the last 168 hours.  No results found for this or any previous visit (from the past 24 hour(s)).    MD Jessica    Billing: This patient is critically ill: yes Total critical care time today 35 min.

## 2022-12-10 NOTE — PROGRESS NOTES
Patient completed 12 hours on HHF via trach adapter on 40%, 30. Breathing tx of Duoneb 3 ml and Mucomyst 2 ml given via [place]. Trach care completed including changing inner cannula. Patient placed back on vent while maintaining 8 ml's of air into cuff. Patient remains on ACVC 450, R18, 30%, 5+. Suctioned moderate, thick, yellow, tan secretions. Will continue to monitor.

## 2022-12-10 NOTE — PLAN OF CARE
Pt transferred to [facility].  Please refer to flowsheets and MAR documentation for 5540-2261 progress

## 2022-12-11 ENCOUNTER — APPOINTMENT (OUTPATIENT)
Dept: GENERAL RADIOLOGY | Facility: CLINIC | Age: 57
DRG: 004 | End: 2022-12-11
Attending: INTERNAL MEDICINE
Payer: COMMERCIAL

## 2022-12-11 LAB
AMMONIA PLAS-SCNC: 130 UMOL/L (ref 10–50)
GLUCOSE BLDC GLUCOMTR-MCNC: 108 MG/DL (ref 70–99)
GLUCOSE BLDC GLUCOMTR-MCNC: 173 MG/DL (ref 70–99)
GLUCOSE BLDC GLUCOMTR-MCNC: 185 MG/DL (ref 70–99)
GLUCOSE BLDC GLUCOMTR-MCNC: 238 MG/DL (ref 70–99)
GLUCOSE BLDC GLUCOMTR-MCNC: 240 MG/DL (ref 70–99)
GLUCOSE BLDC GLUCOMTR-MCNC: 73 MG/DL (ref 70–99)
GLUCOSE BLDC GLUCOMTR-MCNC: 80 MG/DL (ref 70–99)
HOLD SPECIMEN: NORMAL

## 2022-12-11 PROCEDURE — 250N000013 HC RX MED GY IP 250 OP 250 PS 637: Performed by: PHYSICIAN ASSISTANT

## 2022-12-11 PROCEDURE — 94640 AIRWAY INHALATION TREATMENT: CPT

## 2022-12-11 PROCEDURE — 99233 SBSQ HOSP IP/OBS HIGH 50: CPT | Performed by: INTERNAL MEDICINE

## 2022-12-11 PROCEDURE — 250N000009 HC RX 250: Performed by: NURSE PRACTITIONER

## 2022-12-11 PROCEDURE — 250N000013 HC RX MED GY IP 250 OP 250 PS 637: Performed by: INTERNAL MEDICINE

## 2022-12-11 PROCEDURE — 250N000011 HC RX IP 250 OP 636: Performed by: INTERNAL MEDICINE

## 2022-12-11 PROCEDURE — 999N000157 HC STATISTIC RCP TIME EA 10 MIN

## 2022-12-11 PROCEDURE — 99233 SBSQ HOSP IP/OBS HIGH 50: CPT | Performed by: STUDENT IN AN ORGANIZED HEALTH CARE EDUCATION/TRAINING PROGRAM

## 2022-12-11 PROCEDURE — 71045 X-RAY EXAM CHEST 1 VIEW: CPT

## 2022-12-11 PROCEDURE — 94640 AIRWAY INHALATION TREATMENT: CPT | Mod: 76

## 2022-12-11 PROCEDURE — 250N000013 HC RX MED GY IP 250 OP 250 PS 637: Performed by: STUDENT IN AN ORGANIZED HEALTH CARE EDUCATION/TRAINING PROGRAM

## 2022-12-11 PROCEDURE — 250N000009 HC RX 250: Performed by: INTERNAL MEDICINE

## 2022-12-11 PROCEDURE — 71045 X-RAY EXAM CHEST 1 VIEW: CPT | Mod: 76

## 2022-12-11 PROCEDURE — 82140 ASSAY OF AMMONIA: CPT | Performed by: INTERNAL MEDICINE

## 2022-12-11 PROCEDURE — 200N000001 HC R&B ICU

## 2022-12-11 PROCEDURE — 250N000011 HC RX IP 250 OP 636: Performed by: PHYSICIAN ASSISTANT

## 2022-12-11 PROCEDURE — 94003 VENT MGMT INPAT SUBQ DAY: CPT

## 2022-12-11 PROCEDURE — 94668 MNPJ CHEST WALL SBSQ: CPT

## 2022-12-11 PROCEDURE — 250N000012 HC RX MED GY IP 250 OP 636 PS 637: Performed by: NURSE PRACTITIONER

## 2022-12-11 PROCEDURE — 250N000009 HC RX 250: Performed by: PHYSICIAN ASSISTANT

## 2022-12-11 PROCEDURE — 999N000009 HC STATISTIC AIRWAY CARE

## 2022-12-11 PROCEDURE — 250N000012 HC RX MED GY IP 250 OP 636 PS 637: Performed by: INTERNAL MEDICINE

## 2022-12-11 RX ORDER — ACETYLCYSTEINE 200 MG/ML
2 SOLUTION ORAL; RESPIRATORY (INHALATION) 2 TIMES DAILY
Status: DISCONTINUED | OUTPATIENT
Start: 2022-12-11 | End: 2022-12-15 | Stop reason: HOSPADM

## 2022-12-11 RX ADMIN — ACETYLCYSTEINE 2 ML: 200 SOLUTION ORAL; RESPIRATORY (INHALATION) at 07:45

## 2022-12-11 RX ADMIN — INSULIN GLARGINE 30 UNITS: 100 INJECTION, SOLUTION SUBCUTANEOUS at 06:30

## 2022-12-11 RX ADMIN — CHLORHEXIDINE GLUCONATE 0.12% ORAL RINSE 15 ML: 1.2 LIQUID ORAL at 20:36

## 2022-12-11 RX ADMIN — Medication 0.08 MCG/KG/MIN: at 04:41

## 2022-12-11 RX ADMIN — FOLIC ACID 1 MG: 5 INJECTION, SOLUTION INTRAMUSCULAR; INTRAVENOUS; SUBCUTANEOUS at 09:33

## 2022-12-11 RX ADMIN — SODIUM CHLORIDE, PRESERVATIVE FREE 5 ML: 5 INJECTION INTRAVENOUS at 17:21

## 2022-12-11 RX ADMIN — ACYCLOVIR: 50 OINTMENT TOPICAL at 22:22

## 2022-12-11 RX ADMIN — IPRATROPIUM BROMIDE AND ALBUTEROL SULFATE 3 ML: .5; 3 SOLUTION RESPIRATORY (INHALATION) at 19:38

## 2022-12-11 RX ADMIN — IPRATROPIUM BROMIDE AND ALBUTEROL SULFATE 3 ML: .5; 3 SOLUTION RESPIRATORY (INHALATION) at 07:45

## 2022-12-11 RX ADMIN — MIDODRINE HYDROCHLORIDE 5 MG: 5 TABLET ORAL at 17:04

## 2022-12-11 RX ADMIN — RIFAXIMIN 550 MG: 550 TABLET ORAL at 20:36

## 2022-12-11 RX ADMIN — HYDROCORTISONE SODIUM SUCCINATE 25 MG: 100 INJECTION, POWDER, FOR SOLUTION INTRAMUSCULAR; INTRAVENOUS at 02:34

## 2022-12-11 RX ADMIN — Medication 40 MG: at 06:30

## 2022-12-11 RX ADMIN — Medication 1 DROP: at 17:04

## 2022-12-11 RX ADMIN — LACTULOSE 20 G: 20 SOLUTION ORAL at 22:21

## 2022-12-11 RX ADMIN — Medication 15 ML: at 08:34

## 2022-12-11 RX ADMIN — HYDROCORTISONE SODIUM SUCCINATE 25 MG: 100 INJECTION, POWDER, FOR SOLUTION INTRAMUSCULAR; INTRAVENOUS at 20:36

## 2022-12-11 RX ADMIN — TACROLIMUS 1 MG: 5 CAPSULE ORAL at 09:34

## 2022-12-11 RX ADMIN — IPRATROPIUM BROMIDE AND ALBUTEROL SULFATE 3 ML: .5; 3 SOLUTION RESPIRATORY (INHALATION) at 11:52

## 2022-12-11 RX ADMIN — ACYCLOVIR: 50 OINTMENT TOPICAL at 14:43

## 2022-12-11 RX ADMIN — ACYCLOVIR: 50 OINTMENT TOPICAL at 06:30

## 2022-12-11 RX ADMIN — MIDODRINE HYDROCHLORIDE 5 MG: 5 TABLET ORAL at 12:30

## 2022-12-11 RX ADMIN — MIDODRINE HYDROCHLORIDE 5 MG: 5 TABLET ORAL at 08:35

## 2022-12-11 RX ADMIN — Medication 1 DROP: at 22:21

## 2022-12-11 RX ADMIN — IPRATROPIUM BROMIDE AND ALBUTEROL SULFATE 3 ML: .5; 3 SOLUTION RESPIRATORY (INHALATION) at 16:04

## 2022-12-11 RX ADMIN — TACROLIMUS 1 MG: 5 CAPSULE ORAL at 20:36

## 2022-12-11 RX ADMIN — HYDROCORTISONE SODIUM SUCCINATE 25 MG: 100 INJECTION, POWDER, FOR SOLUTION INTRAMUSCULAR; INTRAVENOUS at 14:43

## 2022-12-11 RX ADMIN — ACETYLCYSTEINE 2 ML: 200 SOLUTION ORAL; RESPIRATORY (INHALATION) at 19:38

## 2022-12-11 RX ADMIN — HYDROGEN PEROXIDE: 30 LIQUID TOPICAL at 11:19

## 2022-12-11 RX ADMIN — INSULIN ASPART 4 UNITS: 100 INJECTION, SOLUTION INTRAVENOUS; SUBCUTANEOUS at 04:11

## 2022-12-11 RX ADMIN — CHLORHEXIDINE GLUCONATE 0.12% ORAL RINSE 15 ML: 1.2 LIQUID ORAL at 08:34

## 2022-12-11 RX ADMIN — HYDROCORTISONE SODIUM SUCCINATE 25 MG: 100 INJECTION, POWDER, FOR SOLUTION INTRAMUSCULAR; INTRAVENOUS at 08:34

## 2022-12-11 RX ADMIN — INSULIN ASPART 2 UNITS: 100 INJECTION, SOLUTION INTRAVENOUS; SUBCUTANEOUS at 00:21

## 2022-12-11 RX ADMIN — LACTULOSE 20 G: 20 SOLUTION ORAL at 08:34

## 2022-12-11 RX ADMIN — RIFAXIMIN 550 MG: 550 TABLET ORAL at 12:30

## 2022-12-11 RX ADMIN — Medication 1 DROP: at 08:34

## 2022-12-11 RX ADMIN — LACTULOSE 20 G: 20 SOLUTION ORAL at 17:04

## 2022-12-11 ASSESSMENT — ACTIVITIES OF DAILY LIVING (ADL)
ADLS_ACUITY_SCORE: 40

## 2022-12-11 NOTE — PROGRESS NOTES
Renal Medicine Progress Note            Assessment/Plan:     Blanco Osborne is a 57-year-old male with PMH CARRILLO s/p liver transplant (9/2016) and cirrhosis admitted 11/12/2022 with out of hospital PEA arrest and acute hypoxemic respiratory failure. Course complicated by parainfluenza virus, streptococcal bacteremia, and LORETTA requiring CRRT.         1.  Severe anuric LORETTA:               -CRRT stopped 11/22 and resume on 11/24 and stopped 11/26.               -IHD started 11/29/30. This week ran Sunday 12/4, and Tues/thurs/sat.                2.  s/p Septic shock with MODS:                -intermittant levophed for hypotension               - on 5mg midodrine TID,  Has room to increase     3.  Respiratory failure: current 30%  FIo2              -multiple re intubations               -R Pneumothorax               -Aspergillus PNA             4.  ESLD due to CARRILLO s/p liver transplant                - tacrolimus 1mg BID, dosing per GI     5.  Hypoalbuminemia      6. Anemia        Plan/Recs:  1) HD can be done this week on routine 3x/week basis. Will order for tomorrow as LTACH's typically run on a MWF schedule but if needed for dialysis staffing or other, can wait until Tuesday and run TTS this week. Unclear timing of dischare.   2) dispo to LTACH, continue HD there and monitor for recovery. No signs of urine output.      Torsten Montaño DO  [facility] consultants  Office: [phone]  Cell: [phone]        Interval History:     Pt s/p dialysis yesterday, run cut short (ran 2.5 hrs) with managaement of pneumothorax. Needed increase in levophed and had some hypotension. Stable otherwise overnight, no chest tube placement needed. On 100% fio2 for the pneumo. Improved hemodyanamics this morning, off pressors and BP's 130's.           Medications and Allergies:       - MEDICATION INSTRUCTIONS for Dialysis Patients -   Does not apply See Admin Instructions     acetylcysteine  2 mL Nebulization 4x Daily     acyclovir    Topical Q8H     [Held by provider] apixaban ANTICOAGULANT  5 mg Oral or Feeding Tube BID     artificial tears  1 drop Both Eyes TID     chlorhexidine  15 mL Mouth/Throat Q12H     folic acid  1 mg Intravenous Daily     heparin lock flush  5-20 mL Intracatheter Q24H     hydrocortisone sodium succinate PF  25 mg Intravenous Q6H     insulin aspart  1-12 Units Subcutaneous Q4H     insulin glargine  30 Units Subcutaneous QAM AC     ipratropium - albuterol 0.5 mg/2.5 mg/3 mL  3 mL Nebulization 4x daily     lactulose  20 g Oral or Feeding Tube TID     midodrine  5 mg Oral or Feeding Tube TID w/meals     multivitamins w/minerals  15 mL Per Feeding Tube Daily     pantoprazole  40 mg Per Feeding Tube QAM AC    Or     pantoprazole  40 mg Intravenous QAM AC     rifaximin  550 mg Per Feeding Tube BID     sodium chloride (PF)  10-40 mL Intracatheter Q8H     tacrolimus  1 mg Oral or Feeding Tube BID      No Known Allergies         Physical Exam:   Vitals were reviewed  /85   Pulse 99   Temp 98.3  F (36.8  C) (Axillary)   Resp 16   Ht 1.829 m (6')   Wt 86.3 kg (190 lb 4.1 oz)   SpO2 100%   BMI 25.80 kg/m      Wt Readings from Last 3 Encounters:   12/11/22 86.3 kg (190 lb 4.1 oz)   10/07/19 137.5 kg (303 lb 3.2 oz)   10/04/19 131.5 kg (290 lb)       Intake/Output Summary (Last 24 hours) at 12/11/2022 1031  Last data filed at 12/11/2022 0800  Gross per 24 hour   Intake 1625.96 ml   Output 0.95 ml   Net 1625.01 ml       GENERAL APPEARANCE: awake, alert.   HEENT:  Trach present  RESP:  Few coarse sounds  CV: tachy, regular  ABDOMEN: slight distension, non-tender  EXTREMITIES/SKIN: no c/c/rashes/lesions; no noted dependent edema  LINES:  Right tunneled dialysis catheter present               Data:     BMP  Recent Labs   Lab 12/11/22  0849 12/11/22  0628 12/11/22  0410 12/11/22  0014 12/10/22  2024 12/10/22  2022 12/10/22  1216 12/10/22  1017 12/09/22  0745 12/09/22  0423 12/08/22  0814 12/08/22  0446   NA  --   --   --   --    --  138  --  135  --  136  --  137   POTASSIUM  --   --   --   --   --  3.5  --  4.2  --  3.6  --  3.9   CHLORIDE  --   --   --   --   --  103  --  98  --  99  --  100   KELLY  --   --   --   --   --  7.3*  --  8.1*  --  8.4*  --  7.8*   CO2  --   --   --   --   --  28  --  26  --  28  --  29   BUN  --   --   --   --   --  64*  --  110*  --  70*  --  84*   CR  --   --   --   --   --  2.24*  --  3.60*  --  2.64*  --  3.51*   * 240* 238* 173*   < > 166*   < > 256*   < > 203*   < > 183*    < > = values in this interval not displayed.     CBC  Recent Labs   Lab 12/10/22  1017 12/09/22  0423 12/08/22  2157 12/08/22  0446   WBC 6.4 7.5 6.1 4.6   HGB 8.7* 9.0* 7.9* 7.8*   HCT 27.8* 30.0* 26.1* 26.2*   * 115* 114* 115*   PLT 70* 76* 60* 49*     Lab Results   Component Value Date    AST 20 12/09/2022    ALT 20 12/09/2022    ALKPHOS 301 (H) 12/09/2022    BILITOTAL 0.9 12/09/2022    CHANDLER 130 (HH) 12/11/2022     Lab Results   Component Value Date    INR 1.16 (H) 12/06/2022       Attestation:  I have reviewed today's vital signs, notes, medications, labs and imaging.    DO [facility] - Nephrology  Office: [phone]  Cell: [phone]

## 2022-12-11 NOTE — PROGRESS NOTES
[facility] Intensive Care Unit  Intensivist Note  December 10, 2022      Blanco Osborne MRN# [id number]   Age: 57 year old YOB: 1965     Date of Admission: 11/12/2022         Last 24 hours:   Subjective: up to 0.08mcg of levophed overnight. On 100% fio2 to help reduce size of pneumothorax. Continues to be confused intermittently.          Review of Systems:   All systems reviewed were negative with the exception of the ones listed above.          Objective data   Vitals: B/P: 104/65, T: 97.7, P: 113, R: 25  General: appears uncomfortable  HEENT: normocephalic, vesicles on his upper lip  Lungs: Decreased breath sounds diffusely  CVS: Regular rate and rhythm.   Abdomen: soft, non tender. Present BS  Extremities/musculoskeletal: no peripheral edema  Neurology: intubated and sedated  Skin: no rash  Psychiatry: Mood and affect are appropriate     Ventilation  Vent Mode: CMV/AC  (Continuous Mandatory Ventilation/ Assist Control)  FiO2 (%): 100 %  Resp Rate (Set): 16 breaths/min  Tidal Volume (Set, mL): 450 mL  PEEP (cm H2O): 5 cmH2O  Resp: 16    Recent Labs   Lab 12/10/22  2022 12/05/22  2251   O2PER 30 40        Labs  Recent Labs   Lab 12/10/22  1017 12/09/22  0423 12/08/22  2157 12/08/22  0446   WBC 6.4 7.5 6.1 4.6   HGB 8.7* 9.0* 7.9* 7.8*   PLT 70* 76* 60* 49*     Recent Labs   Lab 12/11/22  0849 12/11/22  0628 12/11/22  0410 12/10/22  2024 12/10/22  2022 12/10/22  1216 12/10/22  1017 12/09/22  0745 12/09/22  0423   NA  --   --   --   --  138  --  135  --  136   POTASSIUM  --   --   --   --  3.5  --  4.2  --  3.6   CHLORIDE  --   --   --   --  103  --  98  --  99   CO2  --   --   --   --  28  --  26  --  28   BUN  --   --   --   --  64*  --  110*  --  70*   CR  --   --   --   --  2.24*  --  3.60*  --  2.64*   * 240* 238*   < > 166*   < > 256*   < > 203*    < > = values in this interval not displayed.     Recent Labs   Lab 12/09/22  0423   AST 20   ALT 20   BILITOTAL 0.9     Recent Labs    Lab 12/06/22  0513   INR 1.16*        Imaging reviewed by myself.          Medications:     Current Facility-Administered Medications   Medication     - MEDICATION INSTRUCTIONS for Dialysis Patients -     acetylcysteine (MUCOMYST) 20 % nebulizer solution 2 mL     acyclovir (ZOVIRAX) 5 % ointment     albuterol (PROVENTIL) neb solution 2.5 mg     [Held by provider] apixaban ANTICOAGULANT (ELIQUIS) tablet 5 mg     carboxymethylcellulose PF (REFRESH PLUS) 0.5 % ophthalmic solution 1 drop     chlorhexidine (PERIDEX) 0.12 % solution 15 mL     dextrose 10% infusion     glucose gel 15-30 g    Or     dextrose 50 % injection 25-50 mL    Or     glucagon injection 1 mg     folic acid injection 1 mg     heparin lock flush 10 UNIT/ML injection 5-20 mL     heparin lock flush 10 UNIT/ML injection 5-20 mL     hydrocortisone sodium succinate PF (solu-CORTEF) injection 25 mg     hydrogen peroxide 3 % solution     insulin aspart (NovoLOG) injection (RAPID ACTING)     insulin glargine (LANTUS PEN) injection 30 Units     ipratropium - albuterol 0.5 mg/2.5 mg/3 mL (DUONEB) neb solution 3 mL     lactulose (CHRONULAC) solution 20 g     medication instruction     Medication Instructions     midodrine (PROAMATINE) tablet 5 mg     multivitamins w/minerals liquid 15 mL     naloxone (NARCAN) injection 0.2 mg    Or     naloxone (NARCAN) injection 0.4 mg    Or     naloxone (NARCAN) injection 0.2 mg    Or     naloxone (NARCAN) injection 0.4 mg     norepinephrine (LEVOPHED) 4 mg in  mL infusion PREMIX     ondansetron (ZOFRAN ODT) ODT tab 4 mg    Or     ondansetron (ZOFRAN) injection 4 mg     oxyCODONE (ROXICODONE) tablet 5 mg     pantoprazole (PROTONIX) 2 mg/mL suspension 40 mg    Or     pantoprazole (PROTONIX) IV push injection 40 mg     polyethylene glycol (MIRALAX) Packet 17 g     prochlorperazine (COMPAZINE) injection 10 mg    Or     prochlorperazine (COMPAZINE) tablet 10 mg    Or     prochlorperazine (COMPAZINE) suppository 25 mg      promethazine (PHENERGAN) syrup 25 mg     sodium chloride (PF) 0.9% PF flush 10-20 mL     sodium chloride (PF) 0.9% PF flush 10-20 mL     sodium chloride (PF) 0.9% PF flush 10-40 mL     sodium chloride 0.9% infusion     tacrolimus (GENERIC EQUIVALENT) suspension 1 mg           Assessment and plan     Blanco Osborne is a 57 year old male admitted on 11/12/2022 for respiratory failure. Chest xr and CT revealed a right basilar pneumothorax. Stable from a hemodynamic standpoint.     Neurology and Psychiatry:   -alert, can be confused at times. Likely due to hepatic encephalopathy.   -cont lactulose, add rifaximin.     Pulmonary:   -on 100% fio2 to help reduce size of pneumothorax. Unable to drain at bedside. Seems loculated.   -IR consulted for pigtail placement.   -apixaban on hold  -if decompensates, will place emergent surgical chest tube in the ICU.     Cardiovascular system:   -low dose vasopressor. Start midodrine.   -eliquis for afib on hold.     Renal/Electrolytes:  -LORETTA requiring CRRT. Very volume overloaded. Bilateral pleural effusions.   -nephrology following.     ID:  -strep and parainfleunza. Completed antibiotics.   -acyclovir ointment for cutaneous hsv    GI/:  -Hx of liver transplant in 2016. Cont tacrolimus and solucortef    Endocrine:   -glucose goal 140-180    Heme/Onc:  -thrombocytopenia due to cirrhosis.     ICU check list:   1. Lines/tubes: PICC and cvc tunneled.   2. Glucose: 140-180  3. Skin: vesicles on his upper lip  4. Nixon: none  5. Nutrition: per G tube  6. DVT proph: eliquis on hold, mechanical proph.   7. GI proph: PPI  8. Bowel movements: titrate to 3-4 per day/   9. Activity: PT.MORIS Liu  MICU staff  Pager [phone]

## 2022-12-11 NOTE — PROGRESS NOTES
Care Management Follow Up    Length of Stay (days): 29    Expected Discharge Date: 12/12/2022     Concerns to be Addressed: discharge planning     Patient plan of care discussed at interdisciplinary rounds: Yes    Anticipated Discharge Disposition:  (LTACH)     Anticipated Discharge Services: Transportation Services (stretcher)  Anticipated Discharge DME: Oxygen (ventilator)    Patient/family educated on Medicare website which has current facility and service quality ratings: yes  Education Provided on the Discharge Plan:    Patient/Family in Agreement with the Plan: yes    Referrals Placed by CM/SW: Post Acute Facilities  Private pay costs discussed: Not applicable    Additional Information:  Discussed with bedside RN today. Case management will reassess on Monday to coordinate transportation. Discussed with Jon at [facility] Transportation, no ride coordinated at this time.       Elodia Mack RN   [facility]   Phone [phone]

## 2022-12-11 NOTE — PROGRESS NOTES
Critical care updates    Patient seen and examined, requiring increasing NE gtt dose to 0.08, but seemed to have been related to dialysis and fluid removal, which has been stopped.   CT chest images reviewed, large bilateral pleural effusions more on the left, moderate sized right mulitloculated pneumothorax.     Vent settings 16/450/5/30, dynamics ok with peak pressure 18, no asynchrony.     Assessment and plan:  1. Right sided pneumothorax of unclear etiology, might've been related to vent asynchrony but current pressures are not suggestive of this mechanism   2. Bilateral pleural effusions  3. Acute renal failure on dialysis  4. Hypotension on NE gtt    Plan:  - Increase FiO2 on vent to 100% to attempt pneumothorax absorption or at least minimize progression  - Titrate NE gtt for MAP > 65  - Holding eliquis   - If hypotension progresses, new tachycardia or hypoxia, will place emergent chest tube and give Kcentra stat   - Discussed with RN       Critical care time not including procedures was 25 minutes

## 2022-12-11 NOTE — PROGRESS NOTES
[facility] ICU RESPIRATORY NOTE        Date of Admission: 11/12/2022    Date of Intubation (most recent): 11/26/2022    Reason for Mechanical Ventilation: Respiratory Failure    Number of Days on Mechanical Ventilation: 15    Met Criteria for Spontaneous Breathing Trial: Yes     Reason for No Spontaneous Breathing Trial: Completed 1 hour during day shift     Significant Events Today: N/A    ABG Results:   Recent Labs   Lab 12/10/22  2022 12/05/22  2251   O2PER 30 40       Current Vent Settings: Vent Mode: CMV/AC  (Continuous Mandatory Ventilation/ Assist Control)  FiO2 (%): 100 %  Resp Rate (Set): 16 breaths/min  Tidal Volume (Set, mL): 450 mL  PEEP (cm H2O): 5 cmH2O  Resp: 16      Skin Assessment: Intact    Plan: Complete wean as tolerated    RT Abril on 12/11/2022 at 5:53 AM

## 2022-12-11 NOTE — PROGRESS NOTES
Shift [id number]    Summary    Pt remains on vent support, FiO2 100% (for pneumothorax per Dr. Sidhu's order), , PEEP 5 and Resp 16. Levo remains @ 0.08 for MAP greater than 65. Pt very lethargic @ 2000, not following commands, not able to keep eyes open, spoke with Dr Sidhu about drawing an ammonia level, as last ammonia was on 11/30, lab ordered for AM and lactulose dose increased from 10mg BID to 20mg TID at that time, please see order. Ammonia drawn and resulted with a critical value of 130 @ 0502. Spoke with Dr Sidhu, plan to titrate for 3 BM's per shift with the new increase dose of lactulose and no new orders at this time. 0000 assessment RN noted bowel sounds being heard in pt's lungs, asked fellow RN to listen as well and confirmed.  Spoke with Dr Sidhu, STAT chest x-ray ordered, pneumothorax and pleural effusions remain the same. Bowel sounds still heard in lungs @ 0400 assessment. Pt had 4 BM's during shift. HD line remains capped and heparin locked by HD nurse. No new skin issues noted during shift.      Halina Moore, RN

## 2022-12-11 NOTE — PLAN OF CARE
SLP - RN was consulted this am.  Given pt's pneumothorax and full vent support needs, plan to cancel SLP PMSV evaluation today.  Will follow.

## 2022-12-11 NOTE — PLAN OF CARE
Neuro: Lethargic, inconsistently following command, confused at times. PERRLA. Generalized weakness.  CV: SO=618c. MAP>65 maintained with levo.  Respiratory: LS coarse. Moderate tan sputum. Trached, only tolerated trach dome 2 hrs before becoming tachypneic/incr WOB. New pneumothorax, MD aware.  GI/: Anuric. On HD. 1 soft stool this shift.  Skin: Scattered bruising. Crusted lesions on mouth, nose. Skin tear to chest and LUE. Blanchable redness to coccyx.  Activity: BR, lift.   Diet: TF at goal.   Plan: Tentative transfer to [facility] pending insurance appeal.

## 2022-12-12 ENCOUNTER — APPOINTMENT (OUTPATIENT)
Dept: INTERVENTIONAL RADIOLOGY/VASCULAR | Facility: CLINIC | Age: 57
DRG: 004 | End: 2022-12-12
Attending: INTERNAL MEDICINE
Payer: COMMERCIAL

## 2022-12-12 LAB
ALBUMIN SERPL-MCNC: 1.6 G/DL (ref 3.4–5)
ALP SERPL-CCNC: 241 U/L (ref 40–150)
ALT SERPL W P-5'-P-CCNC: 15 U/L (ref 0–70)
AMMONIA PLAS-SCNC: 57 UMOL/L (ref 10–50)
ANION GAP SERPL CALCULATED.3IONS-SCNC: 10 MMOL/L (ref 3–14)
AST SERPL W P-5'-P-CCNC: 21 U/L (ref 0–45)
BASOPHILS # BLD AUTO: 0 10E3/UL (ref 0–0.2)
BASOPHILS NFR BLD AUTO: 0 %
BILIRUB SERPL-MCNC: 0.8 MG/DL (ref 0.2–1.3)
BUN SERPL-MCNC: 104 MG/DL (ref 7–30)
CALCIUM SERPL-MCNC: 8 MG/DL (ref 8.5–10.1)
CHLORIDE BLD-SCNC: 101 MMOL/L (ref 94–109)
CO2 SERPL-SCNC: 27 MMOL/L (ref 20–32)
CREAT SERPL-MCNC: 3.48 MG/DL (ref 0.66–1.25)
EOSINOPHIL # BLD AUTO: 0 10E3/UL (ref 0–0.7)
EOSINOPHIL NFR BLD AUTO: 0 %
ERYTHROCYTE [DISTWIDTH] IN BLOOD BY AUTOMATED COUNT: 17.1 % (ref 10–15)
GFR SERPL CREATININE-BSD FRML MDRD: 20 ML/MIN/1.73M2
GLUCOSE BLD-MCNC: 188 MG/DL (ref 70–99)
GLUCOSE BLDC GLUCOMTR-MCNC: 152 MG/DL (ref 70–99)
GLUCOSE BLDC GLUCOMTR-MCNC: 153 MG/DL (ref 70–99)
GLUCOSE BLDC GLUCOMTR-MCNC: 166 MG/DL (ref 70–99)
GLUCOSE BLDC GLUCOMTR-MCNC: 173 MG/DL (ref 70–99)
GLUCOSE BLDC GLUCOMTR-MCNC: 176 MG/DL (ref 70–99)
GLUCOSE BLDC GLUCOMTR-MCNC: 203 MG/DL (ref 70–99)
HBV SURFACE AB SERPL IA-ACNC: 0 M[IU]/ML
HBV SURFACE AB SERPL IA-ACNC: NONREACTIVE M[IU]/ML
HBV SURFACE AG SERPL QL IA: NONREACTIVE
HCT VFR BLD AUTO: 23.1 % (ref 40–53)
HGB BLD-MCNC: 6.9 G/DL (ref 13.3–17.7)
HGB BLD-MCNC: 7.2 G/DL (ref 13.3–17.7)
IMM GRANULOCYTES # BLD: 0 10E3/UL
IMM GRANULOCYTES NFR BLD: 0 %
LYMPHOCYTES # BLD AUTO: 0.4 10E3/UL (ref 0.8–5.3)
LYMPHOCYTES NFR BLD AUTO: 11 %
MAGNESIUM SERPL-MCNC: 2.1 MG/DL (ref 1.6–2.3)
MCH RBC QN AUTO: 33.7 PG (ref 26.5–33)
MCHC RBC AUTO-ENTMCNC: 29.9 G/DL (ref 31.5–36.5)
MCV RBC AUTO: 113 FL (ref 78–100)
MONOCYTES # BLD AUTO: 0.2 10E3/UL (ref 0–1.3)
MONOCYTES NFR BLD AUTO: 7 %
NEUTROPHILS # BLD AUTO: 2.6 10E3/UL (ref 1.6–8.3)
NEUTROPHILS NFR BLD AUTO: 82 %
NRBC # BLD AUTO: 0 10E3/UL
NRBC BLD AUTO-RTO: 0 /100
PHOSPHATE SERPL-MCNC: 5 MG/DL (ref 2.5–4.5)
PLATELET # BLD AUTO: 49 10E3/UL (ref 150–450)
POTASSIUM BLD-SCNC: 4.1 MMOL/L (ref 3.4–5.3)
PROT SERPL-MCNC: 5 G/DL (ref 6.8–8.8)
RBC # BLD AUTO: 2.05 10E6/UL (ref 4.4–5.9)
SODIUM SERPL-SCNC: 138 MMOL/L (ref 133–144)
WBC # BLD AUTO: 3.2 10E3/UL (ref 4–11)

## 2022-12-12 PROCEDURE — 250N000011 HC RX IP 250 OP 636: Performed by: INTERNAL MEDICINE

## 2022-12-12 PROCEDURE — 250N000009 HC RX 250: Performed by: NURSE PRACTITIONER

## 2022-12-12 PROCEDURE — 272N000500 HC NEEDLE CR2

## 2022-12-12 PROCEDURE — 200N000001 HC R&B ICU

## 2022-12-12 PROCEDURE — 250N000012 HC RX MED GY IP 250 OP 636 PS 637: Performed by: NURSE PRACTITIONER

## 2022-12-12 PROCEDURE — 250N000013 HC RX MED GY IP 250 OP 250 PS 637: Performed by: PHYSICIAN ASSISTANT

## 2022-12-12 PROCEDURE — C1769 GUIDE WIRE: HCPCS

## 2022-12-12 PROCEDURE — 99232 SBSQ HOSP IP/OBS MODERATE 35: CPT | Performed by: INTERNAL MEDICINE

## 2022-12-12 PROCEDURE — 32557 INSERT CATH PLEURA W/ IMAGE: CPT

## 2022-12-12 PROCEDURE — 85025 COMPLETE CBC W/AUTO DIFF WBC: CPT | Performed by: SURGERY

## 2022-12-12 PROCEDURE — 250N000009 HC RX 250: Performed by: PHYSICIAN ASSISTANT

## 2022-12-12 PROCEDURE — 272N000196 HC ACCESSORY CR5

## 2022-12-12 PROCEDURE — 80053 COMPREHEN METABOLIC PANEL: CPT | Performed by: SURGERY

## 2022-12-12 PROCEDURE — 99291 CRITICAL CARE FIRST HOUR: CPT | Performed by: INTERNAL MEDICINE

## 2022-12-12 PROCEDURE — 250N000013 HC RX MED GY IP 250 OP 250 PS 637: Performed by: STUDENT IN AN ORGANIZED HEALTH CARE EDUCATION/TRAINING PROGRAM

## 2022-12-12 PROCEDURE — 83735 ASSAY OF MAGNESIUM: CPT | Performed by: SURGERY

## 2022-12-12 PROCEDURE — 94003 VENT MGMT INPAT SUBQ DAY: CPT

## 2022-12-12 PROCEDURE — 999N000157 HC STATISTIC RCP TIME EA 10 MIN

## 2022-12-12 PROCEDURE — 94640 AIRWAY INHALATION TREATMENT: CPT | Mod: 76

## 2022-12-12 PROCEDURE — 999N000185 HC STATISTIC TRANSPORT TIME EA 15 MIN

## 2022-12-12 PROCEDURE — P9047 ALBUMIN (HUMAN), 25%, 50ML: HCPCS | Performed by: INTERNAL MEDICINE

## 2022-12-12 PROCEDURE — 84100 ASSAY OF PHOSPHORUS: CPT | Performed by: PHYSICIAN ASSISTANT

## 2022-12-12 PROCEDURE — 94640 AIRWAY INHALATION TREATMENT: CPT

## 2022-12-12 PROCEDURE — 85018 HEMOGLOBIN: CPT | Performed by: SURGERY

## 2022-12-12 PROCEDURE — 0W9930Z DRAINAGE OF RIGHT PLEURAL CAVITY WITH DRAINAGE DEVICE, PERCUTANEOUS APPROACH: ICD-10-PCS | Performed by: RADIOLOGY

## 2022-12-12 PROCEDURE — 90937 HEMODIALYSIS REPEATED EVAL: CPT

## 2022-12-12 PROCEDURE — 258N000003 HC RX IP 258 OP 636: Performed by: INTERNAL MEDICINE

## 2022-12-12 PROCEDURE — C1729 CATH, DRAINAGE: HCPCS

## 2022-12-12 PROCEDURE — 82140 ASSAY OF AMMONIA: CPT | Performed by: SURGERY

## 2022-12-12 PROCEDURE — 250N000011 HC RX IP 250 OP 636: Performed by: PHYSICIAN ASSISTANT

## 2022-12-12 PROCEDURE — 999N000009 HC STATISTIC AIRWAY CARE

## 2022-12-12 PROCEDURE — 250N000009 HC RX 250: Performed by: INTERNAL MEDICINE

## 2022-12-12 RX ORDER — NALOXONE HYDROCHLORIDE 0.4 MG/ML
0.2 INJECTION, SOLUTION INTRAMUSCULAR; INTRAVENOUS; SUBCUTANEOUS
Status: DISCONTINUED | OUTPATIENT
Start: 2022-12-12 | End: 2022-12-12 | Stop reason: HOSPADM

## 2022-12-12 RX ORDER — ALBUMIN (HUMAN) 12.5 G/50ML
12.5 SOLUTION INTRAVENOUS ONCE
Status: COMPLETED | OUTPATIENT
Start: 2022-12-12 | End: 2022-12-12

## 2022-12-12 RX ORDER — NALOXONE HYDROCHLORIDE 0.4 MG/ML
0.4 INJECTION, SOLUTION INTRAMUSCULAR; INTRAVENOUS; SUBCUTANEOUS
Status: DISCONTINUED | OUTPATIENT
Start: 2022-12-12 | End: 2022-12-12 | Stop reason: HOSPADM

## 2022-12-12 RX ORDER — FENTANYL CITRATE 50 UG/ML
25-50 INJECTION, SOLUTION INTRAMUSCULAR; INTRAVENOUS EVERY 5 MIN PRN
Status: DISCONTINUED | OUTPATIENT
Start: 2022-12-12 | End: 2022-12-12 | Stop reason: HOSPADM

## 2022-12-12 RX ADMIN — ACYCLOVIR: 50 OINTMENT TOPICAL at 14:33

## 2022-12-12 RX ADMIN — ACETYLCYSTEINE 2 ML: 200 SOLUTION ORAL; RESPIRATORY (INHALATION) at 07:24

## 2022-12-12 RX ADMIN — Medication: at 19:02

## 2022-12-12 RX ADMIN — INSULIN ASPART 2 UNITS: 100 INJECTION, SOLUTION INTRAVENOUS; SUBCUTANEOUS at 04:34

## 2022-12-12 RX ADMIN — Medication 1 DROP: at 10:02

## 2022-12-12 RX ADMIN — HYDROCORTISONE SODIUM SUCCINATE 25 MG: 100 INJECTION, POWDER, FOR SOLUTION INTRAMUSCULAR; INTRAVENOUS at 14:27

## 2022-12-12 RX ADMIN — IPRATROPIUM BROMIDE AND ALBUTEROL SULFATE 3 ML: .5; 3 SOLUTION RESPIRATORY (INHALATION) at 07:24

## 2022-12-12 RX ADMIN — IPRATROPIUM BROMIDE AND ALBUTEROL SULFATE 3 ML: .5; 3 SOLUTION RESPIRATORY (INHALATION) at 16:14

## 2022-12-12 RX ADMIN — CHLORHEXIDINE GLUCONATE 0.12% ORAL RINSE 15 ML: 1.2 LIQUID ORAL at 07:51

## 2022-12-12 RX ADMIN — RIFAXIMIN 550 MG: 550 TABLET ORAL at 20:58

## 2022-12-12 RX ADMIN — INSULIN ASPART 1 UNITS: 100 INJECTION, SOLUTION INTRAVENOUS; SUBCUTANEOUS at 00:20

## 2022-12-12 RX ADMIN — MIDODRINE HYDROCHLORIDE 5 MG: 5 TABLET ORAL at 10:01

## 2022-12-12 RX ADMIN — INSULIN ASPART 3 UNITS: 100 INJECTION, SOLUTION INTRAVENOUS; SUBCUTANEOUS at 11:53

## 2022-12-12 RX ADMIN — ACYCLOVIR: 50 OINTMENT TOPICAL at 06:35

## 2022-12-12 RX ADMIN — TACROLIMUS 1 MG: 5 CAPSULE ORAL at 20:58

## 2022-12-12 RX ADMIN — SODIUM CHLORIDE 200 ML: 9 INJECTION, SOLUTION INTRAVENOUS at 16:30

## 2022-12-12 RX ADMIN — CHLORHEXIDINE GLUCONATE 0.12% ORAL RINSE 15 ML: 1.2 LIQUID ORAL at 20:58

## 2022-12-12 RX ADMIN — INSULIN ASPART 1 UNITS: 100 INJECTION, SOLUTION INTRAVENOUS; SUBCUTANEOUS at 16:02

## 2022-12-12 RX ADMIN — TACROLIMUS 1 MG: 5 CAPSULE ORAL at 10:01

## 2022-12-12 RX ADMIN — HEPARIN SODIUM 1900 UNITS: 1000 INJECTION INTRAVENOUS; SUBCUTANEOUS at 19:01

## 2022-12-12 RX ADMIN — HYDROCORTISONE SODIUM SUCCINATE 25 MG: 100 INJECTION, POWDER, FOR SOLUTION INTRAMUSCULAR; INTRAVENOUS at 07:51

## 2022-12-12 RX ADMIN — INSULIN ASPART 2 UNITS: 100 INJECTION, SOLUTION INTRAVENOUS; SUBCUTANEOUS at 07:57

## 2022-12-12 RX ADMIN — ACETYLCYSTEINE 2 ML: 200 SOLUTION ORAL; RESPIRATORY (INHALATION) at 20:02

## 2022-12-12 RX ADMIN — MIDODRINE HYDROCHLORIDE 5 MG: 5 TABLET ORAL at 18:05

## 2022-12-12 RX ADMIN — Medication 15 ML: at 10:01

## 2022-12-12 RX ADMIN — ACYCLOVIR: 50 OINTMENT TOPICAL at 21:03

## 2022-12-12 RX ADMIN — HYDROCORTISONE SODIUM SUCCINATE 25 MG: 100 INJECTION, POWDER, FOR SOLUTION INTRAMUSCULAR; INTRAVENOUS at 20:58

## 2022-12-12 RX ADMIN — FOLIC ACID 1 MG: 5 INJECTION, SOLUTION INTRAMUSCULAR; INTRAVENOUS; SUBCUTANEOUS at 10:01

## 2022-12-12 RX ADMIN — IPRATROPIUM BROMIDE AND ALBUTEROL SULFATE 3 ML: .5; 3 SOLUTION RESPIRATORY (INHALATION) at 11:30

## 2022-12-12 RX ADMIN — MIDODRINE HYDROCHLORIDE 5 MG: 5 TABLET ORAL at 11:49

## 2022-12-12 RX ADMIN — Medication 40 MG: at 06:35

## 2022-12-12 RX ADMIN — ONDANSETRON 4 MG: 2 INJECTION INTRAMUSCULAR; INTRAVENOUS at 15:57

## 2022-12-12 RX ADMIN — RIFAXIMIN 550 MG: 550 TABLET ORAL at 10:01

## 2022-12-12 RX ADMIN — ALBUMIN HUMAN 12.5 G: 0.25 SOLUTION INTRAVENOUS at 16:52

## 2022-12-12 RX ADMIN — IPRATROPIUM BROMIDE AND ALBUTEROL SULFATE 3 ML: .5; 3 SOLUTION RESPIRATORY (INHALATION) at 20:02

## 2022-12-12 RX ADMIN — Medication 1 DROP: at 15:57

## 2022-12-12 RX ADMIN — INSULIN GLARGINE 30 UNITS: 100 INJECTION, SOLUTION SUBCUTANEOUS at 07:58

## 2022-12-12 RX ADMIN — HEPARIN SODIUM 1900 UNITS: 1000 INJECTION INTRAVENOUS; SUBCUTANEOUS at 19:02

## 2022-12-12 RX ADMIN — LIDOCAINE HYDROCHLORIDE 10 ML: 10 INJECTION, SOLUTION INFILTRATION; PERINEURAL at 11:02

## 2022-12-12 RX ADMIN — INSULIN ASPART 2 UNITS: 100 INJECTION, SOLUTION INTRAVENOUS; SUBCUTANEOUS at 21:00

## 2022-12-12 RX ADMIN — HYDROCORTISONE SODIUM SUCCINATE 25 MG: 100 INJECTION, POWDER, FOR SOLUTION INTRAMUSCULAR; INTRAVENOUS at 02:51

## 2022-12-12 ASSESSMENT — ACTIVITIES OF DAILY LIVING (ADL)
ADLS_ACUITY_SCORE: 40

## 2022-12-12 NOTE — PROGRESS NOTES
Shift [id number]    Summary    Neuro: Pt alert in room. Follows commands, nods to yes or no questions, able to mouth words.     Cards: SR to low ST. BP maintained MAP goal greater than 65.    Resp: Pt remains on vent. Minimal secretions to moderate clear/thin secretions. LS clear, with good cough with suction.    GI/: Tube feeds at goal, no residuals during shift. Pt had 4 BMs during shift. Ammonia level rechecked this AM, 57. Pt is anuric, plan for HD today.    Skin: No new skin issues noted. Please see LDAs in chart.    Other: Hgb 6.9 this AM, called provider recheck ordered, results of 7.2. Before results could be resulted provider ordered to transfuse pt. Page sent out to the provider to clarify transfuse order. Plan for today IR for right chest tube.    Halina Moore RN

## 2022-12-12 NOTE — PROGRESS NOTES
Renal Medicine Progress Note            Assessment/Plan:     Blanco Osborne is a 57-year-old male with PMH CARRILLO s/p liver transplant (9/2016) and cirrhosis admitted 11/12/2022 with out of hospital PEA arrest and acute hypoxemic respiratory failure. Course complicated by parainfluenza virus, streptococcal bacteremia, and LORETTA requiring CRRT.         1.  Severe anuric LORETTA:               -CRRT stopped 11/22 and resume on 11/24 and stopped 11/26.               -IHD started 11/29/30.                2.  s/p Septic shock with MODS:                -intermittant levophed for hypotension               - on 5mg midodrine TID,  Has room to increase     3.  Respiratory failure:  now trach'ed              -R Pneumothorax  - s/p CHest tube               -Aspergillus PNA              4.  ESLD due to CARRILLO s/p liver transplant                - tacrolimus 1mg BID, dosing per GI     5.  Hypoalbuminemia      6. Anemia        Plan/Recs:  1) Continue MWF dialysis . HD today     2) dispo to LTACH, continue HD there and monitor for recovery. No signs of renal recovery       Maria Fink MD  [facility] - Nephrology   [phone]          Interval History:     Seen/examined.  No acute issues overnight.  Noted plan for chest tube placement today.  Remains off pressors.  Responsive.  Reports mild abdominal discomfort.  Having loose stool.  Abdomen slightly distended  Afebrile.  Trach +  On tube feed          Medications and Allergies:       - MEDICATION INSTRUCTIONS for Dialysis Patients -   Does not apply See Admin Instructions     acetylcysteine  2 mL Nebulization BID     acyclovir   Topical Q8H     [Held by provider] apixaban ANTICOAGULANT  5 mg Oral or Feeding Tube BID     artificial tears  1 drop Both Eyes TID     chlorhexidine  15 mL Mouth/Throat Q12H     folic acid  1 mg Intravenous Daily     heparin lock flush  5-20 mL Intracatheter Q24H     hydrocortisone sodium succinate PF  25 mg Intravenous Q6H     insulin aspart  1-12  Units Subcutaneous Q4H     insulin glargine  30 Units Subcutaneous QAM AC     ipratropium - albuterol 0.5 mg/2.5 mg/3 mL  3 mL Nebulization 4x daily     lactulose  20 g Oral or Feeding Tube TID     midodrine  5 mg Oral or Feeding Tube TID w/meals     multivitamins w/minerals  15 mL Per Feeding Tube Daily     pantoprazole  40 mg Per Feeding Tube QAM AC    Or     pantoprazole  40 mg Intravenous QAM AC     rifaximin  550 mg Per Feeding Tube BID     sodium chloride (PF)  10-40 mL Intracatheter Q8H     tacrolimus  1 mg Oral or Feeding Tube BID      No Known Allergies         Physical Exam:   Vitals were reviewed  /77   Pulse 107   Temp 98.9  F (37.2  C) (Oral)   Resp 16   Ht 1.829 m (6')   Wt 85.2 kg (187 lb 13.3 oz)   SpO2 99%   BMI 25.47 kg/m      Wt Readings from Last 3 Encounters:   12/12/22 85.2 kg (187 lb 13.3 oz)   10/07/19 137.5 kg (303 lb 3.2 oz)   10/04/19 131.5 kg (290 lb)       Intake/Output Summary (Last 24 hours) at 12/11/2022 1031  Last data filed at 12/11/2022 0800  Gross per 24 hour   Intake 1625.96 ml   Output 0.95 ml   Net 1625.01 ml       GENERAL APPEARANCE: awake, alert.   HEENT:  Trach present  RESP:  Few coarse sounds  CV: tachy, regular  ABDOMEN mild  distension, non-tender  EXTREMITIES/SKIN: no c/c/rashes/lesions; no noted dependent edema  LINES:  Right tunneled dialysis catheter present               Data:     BMP  Recent Labs   Lab 12/12/22  1153 12/12/22  0757 12/12/22  0431 12/10/22  2024 12/10/22  2022 12/10/22  1216 12/10/22  1017 12/09/22  0745 12/09/22  0423   NA  --   --  138  --  138  --  135  --  136   POTASSIUM  --   --  4.1  --  3.5  --  4.2  --  3.6   CHLORIDE  --   --  101  --  103  --  98  --  99   KELLY  --   --  8.0*  --  7.3*  --  8.1*  --  8.4*   CO2  --   --  27  --  28  --  26  --  28   BUN  --   --  104*  --  64*  --  110*  --  70*   CR  --   --  3.48*  --  2.24*  --  3.60*  --  2.64*   * 166* 188*  176*   < > 166*   < > 256*   < > 203*    < > = values in  this interval not displayed.     CBC  Recent Labs   Lab 12/12/22  0526 12/12/22  0431 12/10/22  1017 12/09/22  0423 12/08/22  2157   WBC  --  3.2* 6.4 7.5 6.1   HGB 7.2* 6.9* 8.7* 9.0* 7.9*   HCT  --  23.1* 27.8* 30.0* 26.1*   MCV  --  113* 109* 115* 114*   PLT  --  49* 70* 76* 60*     Lab Results   Component Value Date    AST 21 12/12/2022    ALT 15 12/12/2022    ALKPHOS 241 (H) 12/12/2022    BILITOTAL 0.8 12/12/2022    CHANDLER 57 (H) 12/12/2022     Lab Results   Component Value Date    INR 1.16 (H) 12/06/2022       Attestation:  I have reviewed today's vital signs, notes, medications, labs and imaging.    Maria Fink MD  [facility] - Nephrology

## 2022-12-12 NOTE — PROGRESS NOTES
"Care Management Follow Up    Length of Stay (days): 30    Expected Discharge Date: 12/12/2022     Concerns to be Addressed: discharge planning     Patient plan of care discussed at interdisciplinary rounds: Yes    Anticipated Discharge Disposition:  (LTACH)     Anticipated Discharge Services: Transportation Services (stretcher)  Anticipated Discharge DME: Oxygen (ventilator)    Patient/family educated on Medicare website which has current facility and service quality ratings: yes  Education Provided on the Discharge Plan:    Patient/Family in Agreement with the Plan: yes    Referrals Placed by CM/SW: Post Acute Facilities  Private pay costs discussed: Not applicable    Additional Information:  Spoke with LTACH liaison Kalyani [phone].  She would like updated expected LTACH discharge date; due to chest tube placement today she assumes no longer planning on today, but would like to know if the plan is now for tomorrow 12/13 and if so get a ride set up. Per liaison, \"We can do a chest tube, we like 24 hrs of stability with it in but otherwise shouldn't be an issue!\"     CM-RN reached out to bedside RN, unclear. Will also reach out to provider.    Laura Jamil RN, BSN, PHN  [facility]  Inpatient Care Management - FLOAT  ICU CM RN Mobile: [phone] daily 7:30-4:00      "

## 2022-12-12 NOTE — PLAN OF CARE
Shift Summary  Assumed cares from [id number].    Neuro: Alert, follows commands, nods appropriately, PERRL.   Cardiac: Levo not needed today. Albumin given during HD due to softer pressures. Scheduled Nimodipine also given.   Pulmonary: LS coarse, small to mod amounts of trach secretions. R chest tube placed in IR for pneumo. Scant output this shift.   GI: Multiple loose stools this shift. Per Dr. Jones, ok to hold lactulose if >/= 3 loose stools a day.   : Anuric.   Integumentary: See flow-sheets  Restraints: Not needed  Activity: Bedrest today    Plan of care has been explained to patient: Yes    Jerrica Figueroa RN

## 2022-12-12 NOTE — PROGRESS NOTES
Potassium   Date Value Ref Range Status   12/12/2022 4.1 3.4 - 5.3 mmol/L Final   04/20/2020 3.9 3.4 - 5.3 mmol/L Final     Hemoglobin   Date Value Ref Range Status   12/12/2022 7.2 (L) 13.3 - 17.7 g/dL Final   04/20/2020 14.3 13.3 - 17.7 g/dL Final     Creatinine   Date Value Ref Range Status   12/12/2022 3.48 (H) 0.66 - 1.25 mg/dL Final   04/20/2020 1.06 0.66 - 1.25 mg/dL Final     Urea Nitrogen   Date Value Ref Range Status   12/12/2022 104 (H) 7 - 30 mg/dL Final   04/20/2020 23 7 - 30 mg/dL Final     Sodium   Date Value Ref Range Status   12/12/2022 138 133 - 144 mmol/L Final   04/20/2020 139 133 - 144 mmol/L Final     INR   Date Value Ref Range Status   12/06/2022 1.16 (H) 0.85 - 1.15 Final   10/07/2019 1.20 (H) 0.86 - 1.14 Final       DIALYSIS PROCEDURE NOTE  Hepatitis status of previous patient on machine log was checked and verified ok to use with this patients hepatitis status.  Patient dialyzed for 3 hrs. on a K3 bath with a net fluid removal of  0.4L.  A BFR of 400 ml/min was obtained via R tunneled CVC.      The treatment plan was discussed with Dr. Fink during the treatment.    Total heparin received during the treatment: 0 units.   Line flushed, clamped and capped with heparin 1:1000 1.9 mL (1900 units) per lumen    Meds  given: Albumin   Complications: Hypotension      Person educated: Patient. Knowledge base Limited. Barriers to learning: Unclear on pts ability to retain information,currently with  trach, therefore responses are nods of head yes/no. Educated on Procedure via verbal mode. Patient verbalized understanding.   ICEBOAT? Timeout performed pre-treatment  I: Patient was identified using 2 identifiers  C:  Consent Signed Yes  E: Equipment preventative maintenance is current and dialysis delivery system OK to use  B:    Latest Reference Range & Units 12/10/22 20:22   Hep B Surface Agn Nonreactive  Nonreactive   Hepatitis B Surface Antibody Instrument Value <8.00 m[IU]/mL 0.00   Hepatitis B  Surface Antibody  Nonreactive   O: Dialysis orders present and complete prior to treatment  A: Vascular access verified and assessed prior to treatment  T: Treatment was performed at a clinically appropriate time  ?: Patient was allowed to ask questions and address concerns prior to treatment  See Adult Hemodialysis flowsheet in EPIC for further details and post assessment.  Machine water alarm in place and functioning. Transducer pods intact and checked every 15min.   Pt assisted with repositioning throughout dialysis treatment.  Chlorine/Chloramine water system checked every 4 hours.      Post treatment report given to PETER Young regarding 0.4L of fluid removed, last BP 97/65.    Karina Nixon RN

## 2022-12-12 NOTE — PROGRESS NOTES
[facility] ICU RESPIRATORY NOTE           Date of Admission: 11/12/2022     Date of Intubation (most recent): 11/26/2022     Reason for Mechanical Ventilation: Respiratory Failure     Number of Days on Mechanical Ventilation: 16     Met Criteria for Spontaneous Breathing Trial: Yes      Reason for No Spontaneous Breathing Trial:          Significant Events Today: N/A     ABG Results:   Recent Labs   Lab 12/10/22  2022 12/05/22  2251   O2PER 30 40     Vent Mode: CMV/AC  (Continuous Mandatory Ventilation/ Assist Control)  FiO2 (%): 100 %  Resp Rate (Set): 16 breaths/min  Tidal Volume (Set, mL): 450 mL  PEEP (cm H2O): 5 cmH2O  Resp: 16    YSABEL Brantley, RRT

## 2022-12-12 NOTE — PLAN OF CARE
Pt alert and follows commands.  Pt picks at tubing occasionally, nothing pulled out.  Pt mouths words, wiggles toes and weak hand grasp bilat.  Pt denies shortness of breath.  Pt sinus tachycardic 100s-110s.  Pt levo off at 1530, BPs 110s/70s.  Pt NPO for chest tube which will be placed tomorrow.  TF restarted at 1600.  No residual.  Wife at bedside and interacting with pt.  Pt did not wean secondary to pneumo.  Pt with 2 medium watery stools and 2 small amts.  Wife updated on plan of care.

## 2022-12-12 NOTE — IR NOTE
Interventional Radiology Intra-procedural Nursing Note    Patient Name: Blanco Osborne  Medical Record Number: [id number]  Today's Date: December 12, 2022    Start Time: 1058  End of procedure time: 1107  Procedure: Chest tube placement non-tunneled  Report given to: ICU RN  Time pt departs:  1115    Other Notes: Pt into IR suite 2 via cart. Pt awake and alert. To table in supine position. Monitoring equipment applied. VSS. Tele SR. Dr. Cornell in room. Time out and procedure started. Pt tolerated procedure well. Debrief with Dr. Cornell. Chest tube placed and pressure held until hemostasis achieved. Dressing CDI. No complications. Pt transferred back to ICU.    Medications:    Lidocaine 1% 10 ml    Osmar Garcia RN

## 2022-12-12 NOTE — CONSULTS
"    Patient is on IR schedule today Monday 12/12/22 for a Right chest tube placement ~ mid to late morning for a  \"loculated right basilar pneumothorax. Eliquis was stopped yesterday evening. Patient is on low dose pressors. Trached, on minimal vent settings\"    -Labs WNL for procedure.    -No NPO required.   -Phone consent was obtained from wife Ofe after explaining the procedure, risks and benefits and consent is in IR.     Please contact the IR department at [phone] for procedural related questions.     Thanks, Nina [facility] Interventional Radiology CNP ([phone]) (phone [phone])         "

## 2022-12-13 ENCOUNTER — APPOINTMENT (OUTPATIENT)
Dept: GENERAL RADIOLOGY | Facility: CLINIC | Age: 57
DRG: 004 | End: 2022-12-13
Attending: INTERNAL MEDICINE
Payer: COMMERCIAL

## 2022-12-13 ENCOUNTER — APPOINTMENT (OUTPATIENT)
Dept: PHYSICAL THERAPY | Facility: CLINIC | Age: 57
DRG: 004 | End: 2022-12-13
Attending: STUDENT IN AN ORGANIZED HEALTH CARE EDUCATION/TRAINING PROGRAM
Payer: COMMERCIAL

## 2022-12-13 ENCOUNTER — APPOINTMENT (OUTPATIENT)
Dept: GENERAL RADIOLOGY | Facility: CLINIC | Age: 57
DRG: 004 | End: 2022-12-13
Attending: STUDENT IN AN ORGANIZED HEALTH CARE EDUCATION/TRAINING PROGRAM
Payer: COMMERCIAL

## 2022-12-13 ENCOUNTER — APPOINTMENT (OUTPATIENT)
Dept: SPEECH THERAPY | Facility: CLINIC | Age: 57
DRG: 004 | End: 2022-12-13
Attending: INTERNAL MEDICINE
Payer: COMMERCIAL

## 2022-12-13 LAB
ABO/RH(D): NORMAL
ALBUMIN SERPL-MCNC: 1.7 G/DL (ref 3.4–5)
ANION GAP SERPL CALCULATED.3IONS-SCNC: 6 MMOL/L (ref 3–14)
ANTIBODY SCREEN: NEGATIVE
BLD PROD TYP BPU: NORMAL
BLOOD COMPONENT TYPE: NORMAL
BUN SERPL-MCNC: 72 MG/DL (ref 7–30)
CALCIUM SERPL-MCNC: 7.4 MG/DL (ref 8.5–10.1)
CHLORIDE BLD-SCNC: 101 MMOL/L (ref 94–109)
CO2 SERPL-SCNC: 29 MMOL/L (ref 20–32)
CODING SYSTEM: NORMAL
CREAT SERPL-MCNC: 2.55 MG/DL (ref 0.66–1.25)
CROSSMATCH: NORMAL
ERYTHROCYTE [DISTWIDTH] IN BLOOD BY AUTOMATED COUNT: 17 % (ref 10–15)
FERRITIN SERPL-MCNC: 212 NG/ML (ref 26–388)
GFR SERPL CREATININE-BSD FRML MDRD: 28 ML/MIN/1.73M2
GLUCOSE BLD-MCNC: 222 MG/DL (ref 70–99)
GLUCOSE BLDC GLUCOMTR-MCNC: 149 MG/DL (ref 70–99)
GLUCOSE BLDC GLUCOMTR-MCNC: 149 MG/DL (ref 70–99)
GLUCOSE BLDC GLUCOMTR-MCNC: 184 MG/DL (ref 70–99)
GLUCOSE BLDC GLUCOMTR-MCNC: 197 MG/DL (ref 70–99)
GLUCOSE BLDC GLUCOMTR-MCNC: 207 MG/DL (ref 70–99)
GLUCOSE BLDC GLUCOMTR-MCNC: 210 MG/DL (ref 70–99)
HCT VFR BLD AUTO: 22 % (ref 40–53)
HGB BLD-MCNC: 6.5 G/DL (ref 13.3–17.7)
IRON SATN MFR SERPL: 27 % (ref 15–46)
IRON SERPL-MCNC: 37 UG/DL (ref 35–180)
ISSUE DATE AND TIME: NORMAL
MCH RBC QN AUTO: 33.3 PG (ref 26.5–33)
MCHC RBC AUTO-ENTMCNC: 29.5 G/DL (ref 31.5–36.5)
MCV RBC AUTO: 113 FL (ref 78–100)
PHOSPHATE SERPL-MCNC: 4.1 MG/DL (ref 2.5–4.5)
PLATELET # BLD AUTO: 63 10E3/UL (ref 150–450)
POTASSIUM BLD-SCNC: 3.8 MMOL/L (ref 3.4–5.3)
RBC # BLD AUTO: 1.95 10E6/UL (ref 4.4–5.9)
RETICS # AUTO: 0.12 10E6/UL (ref 0.03–0.1)
RETICS/RBC NFR AUTO: 6.5 % (ref 0.5–2)
SODIUM SERPL-SCNC: 136 MMOL/L (ref 133–144)
SPECIMEN EXPIRATION DATE: NORMAL
TIBC SERPL-MCNC: 138 UG/DL (ref 240–430)
TRANSFERRIN SERPL-MCNC: 112 MG/DL (ref 200–360)
UNIT ABO/RH: NORMAL
UNIT NUMBER: NORMAL
UNIT STATUS: NORMAL
UNIT TYPE ISBT: 5100
WBC # BLD AUTO: 4.6 10E3/UL (ref 4–11)

## 2022-12-13 PROCEDURE — 94640 AIRWAY INHALATION TREATMENT: CPT

## 2022-12-13 PROCEDURE — 250N000013 HC RX MED GY IP 250 OP 250 PS 637: Performed by: PHYSICIAN ASSISTANT

## 2022-12-13 PROCEDURE — 94660 CPAP INITIATION&MGMT: CPT

## 2022-12-13 PROCEDURE — 250N000013 HC RX MED GY IP 250 OP 250 PS 637: Performed by: STUDENT IN AN ORGANIZED HEALTH CARE EDUCATION/TRAINING PROGRAM

## 2022-12-13 PROCEDURE — 94003 VENT MGMT INPAT SUBQ DAY: CPT

## 2022-12-13 PROCEDURE — 99291 CRITICAL CARE FIRST HOUR: CPT | Performed by: INTERNAL MEDICINE

## 2022-12-13 PROCEDURE — 83550 IRON BINDING TEST: CPT | Performed by: INTERNAL MEDICINE

## 2022-12-13 PROCEDURE — 250N000009 HC RX 250: Performed by: INTERNAL MEDICINE

## 2022-12-13 PROCEDURE — C9113 INJ PANTOPRAZOLE SODIUM, VIA: HCPCS | Performed by: PHYSICIAN ASSISTANT

## 2022-12-13 PROCEDURE — 84466 ASSAY OF TRANSFERRIN: CPT | Performed by: INTERNAL MEDICINE

## 2022-12-13 PROCEDURE — 94640 AIRWAY INHALATION TREATMENT: CPT | Mod: 76

## 2022-12-13 PROCEDURE — 97530 THERAPEUTIC ACTIVITIES: CPT | Mod: GP | Performed by: PHYSICAL THERAPIST

## 2022-12-13 PROCEDURE — 999N000157 HC STATISTIC RCP TIME EA 10 MIN

## 2022-12-13 PROCEDURE — 250N000013 HC RX MED GY IP 250 OP 250 PS 637: Performed by: INTERNAL MEDICINE

## 2022-12-13 PROCEDURE — 71045 X-RAY EXAM CHEST 1 VIEW: CPT

## 2022-12-13 PROCEDURE — 250N000011 HC RX IP 250 OP 636: Performed by: INTERNAL MEDICINE

## 2022-12-13 PROCEDURE — 99232 SBSQ HOSP IP/OBS MODERATE 35: CPT | Performed by: INTERNAL MEDICINE

## 2022-12-13 PROCEDURE — 86923 COMPATIBILITY TEST ELECTRIC: CPT | Performed by: STUDENT IN AN ORGANIZED HEALTH CARE EDUCATION/TRAINING PROGRAM

## 2022-12-13 PROCEDURE — 250N000011 HC RX IP 250 OP 636: Performed by: PHYSICIAN ASSISTANT

## 2022-12-13 PROCEDURE — 250N000009 HC RX 250: Performed by: PHYSICIAN ASSISTANT

## 2022-12-13 PROCEDURE — 200N000001 HC R&B ICU

## 2022-12-13 PROCEDURE — 250N000009 HC RX 250: Performed by: NURSE PRACTITIONER

## 2022-12-13 PROCEDURE — G0463 HOSPITAL OUTPT CLINIC VISIT: HCPCS

## 2022-12-13 PROCEDURE — 250N000012 HC RX MED GY IP 250 OP 636 PS 637: Performed by: NURSE PRACTITIONER

## 2022-12-13 PROCEDURE — 80069 RENAL FUNCTION PANEL: CPT | Performed by: INTERNAL MEDICINE

## 2022-12-13 PROCEDURE — 82728 ASSAY OF FERRITIN: CPT | Performed by: INTERNAL MEDICINE

## 2022-12-13 PROCEDURE — 86901 BLOOD TYPING SEROLOGIC RH(D): CPT | Performed by: STUDENT IN AN ORGANIZED HEALTH CARE EDUCATION/TRAINING PROGRAM

## 2022-12-13 PROCEDURE — P9016 RBC LEUKOCYTES REDUCED: HCPCS | Performed by: STUDENT IN AN ORGANIZED HEALTH CARE EDUCATION/TRAINING PROGRAM

## 2022-12-13 PROCEDURE — 92609 USE OF SPEECH DEVICE SERVICE: CPT | Mod: GN | Performed by: SPEECH-LANGUAGE PATHOLOGIST

## 2022-12-13 PROCEDURE — 86850 RBC ANTIBODY SCREEN: CPT | Performed by: STUDENT IN AN ORGANIZED HEALTH CARE EDUCATION/TRAINING PROGRAM

## 2022-12-13 PROCEDURE — 92597 ORAL SPEECH DEVICE EVAL: CPT | Mod: GN | Performed by: SPEECH-LANGUAGE PATHOLOGIST

## 2022-12-13 PROCEDURE — 94799 UNLISTED PULMONARY SVC/PX: CPT

## 2022-12-13 PROCEDURE — 85045 AUTOMATED RETICULOCYTE COUNT: CPT | Performed by: INTERNAL MEDICINE

## 2022-12-13 PROCEDURE — 85018 HEMOGLOBIN: CPT | Performed by: INTERNAL MEDICINE

## 2022-12-13 PROCEDURE — 74018 RADEX ABDOMEN 1 VIEW: CPT

## 2022-12-13 RX ORDER — MIDODRINE HYDROCHLORIDE 5 MG/1
10 TABLET ORAL
Status: DISCONTINUED | OUTPATIENT
Start: 2022-12-13 | End: 2022-12-15 | Stop reason: HOSPADM

## 2022-12-13 RX ADMIN — PANTOPRAZOLE SODIUM 40 MG: 40 INJECTION, POWDER, FOR SOLUTION INTRAVENOUS at 07:44

## 2022-12-13 RX ADMIN — HYDROCORTISONE SODIUM SUCCINATE 25 MG: 100 INJECTION, POWDER, FOR SOLUTION INTRAMUSCULAR; INTRAVENOUS at 07:44

## 2022-12-13 RX ADMIN — IPRATROPIUM BROMIDE AND ALBUTEROL SULFATE 3 ML: .5; 3 SOLUTION RESPIRATORY (INHALATION) at 20:13

## 2022-12-13 RX ADMIN — IPRATROPIUM BROMIDE AND ALBUTEROL SULFATE 3 ML: .5; 3 SOLUTION RESPIRATORY (INHALATION) at 11:21

## 2022-12-13 RX ADMIN — LACTULOSE 20 G: 20 SOLUTION ORAL at 16:52

## 2022-12-13 RX ADMIN — Medication 0.03 MCG/KG/MIN: at 00:32

## 2022-12-13 RX ADMIN — IPRATROPIUM BROMIDE AND ALBUTEROL SULFATE 3 ML: .5; 3 SOLUTION RESPIRATORY (INHALATION) at 15:02

## 2022-12-13 RX ADMIN — CHLORHEXIDINE GLUCONATE 0.12% ORAL RINSE 15 ML: 1.2 LIQUID ORAL at 07:44

## 2022-12-13 RX ADMIN — APIXABAN 5 MG: 5 TABLET, FILM COATED ORAL at 20:18

## 2022-12-13 RX ADMIN — LACTULOSE 20 G: 20 SOLUTION ORAL at 21:32

## 2022-12-13 RX ADMIN — INSULIN ASPART 1 UNITS: 100 INJECTION, SOLUTION INTRAVENOUS; SUBCUTANEOUS at 07:50

## 2022-12-13 RX ADMIN — ACETYLCYSTEINE 2 ML: 200 SOLUTION ORAL; RESPIRATORY (INHALATION) at 07:58

## 2022-12-13 RX ADMIN — ACETYLCYSTEINE 2 ML: 200 SOLUTION ORAL; RESPIRATORY (INHALATION) at 20:13

## 2022-12-13 RX ADMIN — ACYCLOVIR: 50 OINTMENT TOPICAL at 14:25

## 2022-12-13 RX ADMIN — HYDROCORTISONE SODIUM SUCCINATE 25 MG: 100 INJECTION, POWDER, FOR SOLUTION INTRAMUSCULAR; INTRAVENOUS at 20:26

## 2022-12-13 RX ADMIN — Medication 1 DROP: at 09:54

## 2022-12-13 RX ADMIN — TACROLIMUS 1 MG: 5 CAPSULE ORAL at 09:54

## 2022-12-13 RX ADMIN — HYDROGEN PEROXIDE: 30 LIQUID TOPICAL at 20:29

## 2022-12-13 RX ADMIN — Medication 1 DROP: at 16:52

## 2022-12-13 RX ADMIN — MIDODRINE HYDROCHLORIDE 10 MG: 5 TABLET ORAL at 12:30

## 2022-12-13 RX ADMIN — INSULIN ASPART 3 UNITS: 100 INJECTION, SOLUTION INTRAVENOUS; SUBCUTANEOUS at 04:29

## 2022-12-13 RX ADMIN — IPRATROPIUM BROMIDE AND ALBUTEROL SULFATE 3 ML: .5; 3 SOLUTION RESPIRATORY (INHALATION) at 07:58

## 2022-12-13 RX ADMIN — MIDODRINE HYDROCHLORIDE 10 MG: 5 TABLET ORAL at 09:54

## 2022-12-13 RX ADMIN — FOLIC ACID 1 MG: 5 INJECTION, SOLUTION INTRAMUSCULAR; INTRAVENOUS; SUBCUTANEOUS at 10:05

## 2022-12-13 RX ADMIN — TACROLIMUS 1 MG: 5 CAPSULE ORAL at 20:18

## 2022-12-13 RX ADMIN — OXYCODONE HYDROCHLORIDE 5 MG: 5 TABLET ORAL at 00:00

## 2022-12-13 RX ADMIN — INSULIN ASPART 3 UNITS: 100 INJECTION, SOLUTION INTRAVENOUS; SUBCUTANEOUS at 20:31

## 2022-12-13 RX ADMIN — INSULIN GLARGINE 30 UNITS: 100 INJECTION, SOLUTION SUBCUTANEOUS at 07:50

## 2022-12-13 RX ADMIN — INSULIN ASPART 2 UNITS: 100 INJECTION, SOLUTION INTRAVENOUS; SUBCUTANEOUS at 00:04

## 2022-12-13 RX ADMIN — Medication 1 DROP: at 21:32

## 2022-12-13 RX ADMIN — RIFAXIMIN 550 MG: 550 TABLET ORAL at 20:18

## 2022-12-13 RX ADMIN — INSULIN ASPART 1 UNITS: 100 INJECTION, SOLUTION INTRAVENOUS; SUBCUTANEOUS at 16:56

## 2022-12-13 RX ADMIN — Medication 15 ML: at 09:54

## 2022-12-13 RX ADMIN — INSULIN ASPART 3 UNITS: 100 INJECTION, SOLUTION INTRAVENOUS; SUBCUTANEOUS at 12:35

## 2022-12-13 RX ADMIN — HYDROCORTISONE SODIUM SUCCINATE 25 MG: 100 INJECTION, POWDER, FOR SOLUTION INTRAMUSCULAR; INTRAVENOUS at 14:25

## 2022-12-13 RX ADMIN — RIFAXIMIN 550 MG: 550 TABLET ORAL at 09:54

## 2022-12-13 RX ADMIN — HYDROCORTISONE SODIUM SUCCINATE 25 MG: 100 INJECTION, POWDER, FOR SOLUTION INTRAMUSCULAR; INTRAVENOUS at 01:54

## 2022-12-13 RX ADMIN — OXYCODONE HYDROCHLORIDE 5 MG: 5 TABLET ORAL at 20:18

## 2022-12-13 ASSESSMENT — ACTIVITIES OF DAILY LIVING (ADL)
ADLS_ACUITY_SCORE: 40

## 2022-12-13 NOTE — PROGRESS NOTES
[facility] ICU RESPIRATORY NOTE        Date of Admission: 11/12/2022    Date of Intubation (most recent): 11/26/2022    Reason for Mechanical Ventilation: Respiratory failure    Number of Days on Mechanical Ventilation: 17    Met Criteria for Spontaneous Breathing Trial: Yes    Reason for No Spontaneous Breathing Trial: None overnight per MD    Significant Events Today: None.    ABG Results:   Recent Labs   Lab 12/10/22  2022   O2PER 30       Current Vent Settings: Vent Mode: CMV/AC  (Continuous Mandatory Ventilation/ Assist Control)  FiO2 (%): 40 %  Resp Rate (Set): 16 breaths/min  Tidal Volume (Set, mL): 450 mL  PEEP (cm H2O): 5 cmH2O  Resp: 16        Jayson Marie, RT on 12/13/2022 at 6:16 AM

## 2022-12-13 NOTE — PROGRESS NOTES
Statement Selected [facility] Nurse Inpatient Assessment     Consulted for: Philtrum/Columella, bilateral buttock, right nose, upper lip, upper chin, chest skin tear      Patient History (according to provider note(s):      57M cirrhosis, CARRILLO s/p liver txp (9/2016) admitted to ICU 11/12/22 after out of hospital PEA arrest and acute hypoxemic respiratory failure. Course c/b LORETTA requiring CRRT. Found to have Parainfluenza virus and Streptococcal bacteremia.     Areas Assessed:      Areas visualized during today's visit: Face and neck and Sacrum/coccyx    Pressure Injury Location: Philtrum/Columella    Last photo: 12/13/22 11/16/22  Philtrum/Columella    Right lateral nose 11/16  Wound type: Pressure Injury and Viral Lesions      Wound history/plan of care:  Wound initial suspected to be pressure as RT had found a tight ETAD and performed appropriate interventions. 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     Tunneling N/A     Undermining N/A  Periwound skin: Intact      Color: normal and consistent with surrounding tissue      Temperature: normal   Odor: none  Pain: mild, tender per patient  Pain intervention prior to dressing change: slow and gentle cares   Treatment goal: Heal  and Protection  STATUS: improving  Supplies ordered: discussed with RN     Wound location: Right nose    Last photo: 12/13/22 11/16/22    Wound due to: Herpetic Lesion  Wound base: 100% dried drainage.      Palpation of the wound bed: normal      Drainage: scant     Description of drainage: serosanguinous     Measurements (length x width x depth, in cm): 0.2cm x 0.1cm x  UTD       Tunneling: N/A     Undermining: N/A  Periwound skin: Intact      Color: pink      Temperature:  normal   Odor: none  Pain: no grimacing or signs of discomfort,   Pain interventions prior to dressing change: slow and gentle cares   Treatment goal: Heal  and Protection  STATUS: healing, almost fully healed, small scab remains  Supplies ordered: discussed with RN      Wound location: mid upper lip- healed 12/13    Last photo: 12/13/22     Wound due to: hereptic lesion,     Wound history/plan of care: Patient had emergent intubation on 11/22. HSV 1 positive. No devices appear to be resting on injury to indicate pressure.     Wound base: 100 % blanchable pink      Palpation of the wound bed: normal      Drainage: none     Description of drainage: none     Measurements (length x width x depth, in cm): healed      Tunneling: N/A     Undermining: N/A  Periwound skin: Intact      Color: normal and consistent with surrounding tissue      Temperature: normal   Odor: none  Pain: no grimacing or signs of discomfort, none  Pain interventions prior to dressing change: slow and gentle cares   Treatment goal: Heal  and Protection  STATUS: healed      Wound location: Right upper chin- healed 12/13    Last photo: 12/13/22      Wound due to: herpetic lesion  Wound history/plan of care: Wound does not appear to be from pressure, no devices on this area especially without causing pressure further on lip.HSV1 positive   Wound base: 100 % epidermis- helaed     Palpation of the wound bed: normal      Drainage: none     Description of drainage: none     Tunneling: N/A     Undermining: N/A  Periwound skin: Intact      Color: normal and consistent with surrounding tissue      Temperature: normal   Odor: none  Pain: no grimacing or signs of discomfort, none  Pain interventions prior to dressing change: patient tolerated well  STATUS: healed         Treatment Plan:     Columella/Philtrum/Nosewound(s): Every shift   1. Cleanse site with saline. Pat dry with gauze.  2. Ensure Etad has Q tip clearance and place cut strip of Mepilex 4x4 or  Optifoam under ETAD to ensure offloading.  3. Apply acyclovir Q8H.   Avoid vaseline       Pressure Injury prevention (please order supplies if not in room)  1. Turn/reposition every 1-2 hrs  2.   Float heels off bed with use of pillows or if needed Heel Lift boots (Prevalon #[id number])  3.   If incontinent Cleanse with incontinent cleanser (Yara spray #[id number]) followed by skin barrier protectant (Critic Aid paste)  BID and after each incontinent episode  4.   Prevent sliding and shear by limiting HOB to 30 degrees or less unless contraindicated, use knee gatch first if not contraindicated  5.   Chair cushion pressure redistribution as needed (#420302): please limit sitting to an hour at a time  6.   Optimize nutrition   7.   PULSATE low air loss mattress     Orders: Reviewed and Updated    RECOMMEND PRIMARY TEAM ORDER: None, at this time  Education provided: importance of repositioning, plan of care, Moisture management, Hygiene and Off-loading pressure  Discussed plan of care with: Nurse  WOC nurse follow-up plan: weekly  Notify WOC if wound(s) deteriorate.  Nursing to notify the Provider(s) and re-consult the WOC Nurse if new skin concern.    DATA:     Current support surface: Standard  Low air loss mattress with pulsation , ICU  Containment of urine/stool: Incontinent pad in bed  BMI: Body mass index is 26.04 kg/m .   Active diet order: Orders Placed This Encounter      NPO for Medical/Clinical Reasons Except for: Meds     Output: I/O last 3 completed shifts:  In: 1780.7 [I.V.:110.7; NG/GT:180]  Out: 400 [Other:400]     Labs:   Recent Labs   Lab 12/13/22  0427   ALBUMIN 1.7*   HGB 6.5*   WBC 4.6     Pressure injury risk assessment:   Sensory Perception: 3-->slightly limited  Moisture: 2-->very moist  Activity: 2-->chairfast  Mobility: 2-->very limited  Nutrition: 3-->adequate  Friction and Shear: 1-->problem  Kareem Score: 13    Ashleigh CASTANEDA   Dept. Pager: [phone]  Dept. Office Number:  [phone]

## 2022-12-13 NOTE — PROGRESS NOTES
SLP PMSV Communication Evaluation  12/13/22 3174   Appointment Info   Signing Clinician's Name / Credentials (SLP) Jie Marcus MA [facility]3] SLP   General Information   Onset of Illness/Injury or Date of Surgery 12/12/22   Referring Physician Dr. Jones   Patient/Family Therapy Goal Statement (SLP) Did not state   Pertinent History of Current Problem Dx: cardiac arrest; Intubated 12/12-11/20, reintubated 11/21 - 11/23; 11/26 re-intubated   General Observations Pt was awake, but slow to respond.  When PMSV was on, mod-max repeated cues needed for pt to respond.   Type of Evaluation   Type of Evaluation Artificial Airway (Speaking Valve)   Tracheostomy Assessment (Speaking Valve)   Cuff, Tracheostomy Tube cuffed, inflated   Date of Tracheostomy 11/29/22   Tube Size, Tracheostomy 8  (to a 6 XLT now per RT)   Participation Ability (Speaking Valve) awake/alert  (pt did not always attempt to answer questions or follow commands)   Type, Tracheostomy Tube Shiley   Respiratory Status (Speaking Valve)   Oxygen Supply Trach Dome   Oral/Tracheal Secretions (Speaking Valve)   Oral Secretions (Speaking Valve Assessment) moderate secretions   Tracheal Secretions (Speaking Valve Assessment) moderate secretions  (Initially)   Speaking Valve Trials (Speaking Valve)   Outcome of Trial (Speaking Valve) tolerance is good;cuff reinflated   Voice Production (Speaking Valve Trial) fair strength/quality   Breath Support (Speaking Valve Trial) impaired coordination of speech with breath support   Set-up/Cuff Deflation (Speaking Valve Trial) cuff deflated, total   Recommendations (Speaking Valve Trials) continue speaking valve trials with SLP present   Cough Production (Speaking Valve Trial) fair   Secretions/Suction, Cuff Deflation (Speaking Valve) increased secretions;oral suctioning post cuff deflation;tracheal suctioning prior to trial;tracheal suctioning post cuff deflation   Secretions During Valve Use (Speaking Valve Trial) secretions  managed well during valve use  (after initial suctioning provided)   Airflow/Phonation   (Unable to finger occlude due to inline suctioning, slight sound produced with leak speech)   Speaking Valve placed on tracheostomy tube   Cuff Inflated at Onset of Evaluation Yes   Orders received to deflate cuff for PMSV trial Yes   Oxygen saturation before cuff deflation 98 %   Oxygen saturation after cuff deflation 98 %   Oxygen saturation with PMSV placement 96 %   Total amount of time with PMSV placement: 10   Respiratory rate before cuff deflation 23 Per Minute   Respiratory rate after cuff deflation 23 Per Minute   Respiratory Rate with PMSV placement 25 Per Minute   Clinical Impression   Criteria for Skilled Therapeutic Interventions Met (SLP Eval) Yes, treatment indicated   SLP 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  Pt tolerated PMSV placement on trach dome for 10-15 minutes.  Decreased verbal initiation, confusion +/- aphasia, and weak voicing slightly above a whisper impacted expression.  VSS during trial.  Plan to continue PMSV Tx daily as able to maximize verbal communication for daily needs.  Recommend PMSV use only with SLP supervision at this time.  Will evaluate swallow function as indicated during pt's course of Tx.   SLP Total Evaluation Time   Eval: use and/or fitting of voice prosthetic device to supp speech (not aug. comm) Minutes (96032) 15   Interventions   Interventions Quick Adds Speech, Language, Voice & Communication   Speech, Language, Voice Communication&/or Auditory Processing   Treatment of Speech, Language, Voice Communication&/or Auditory Processing  "Minutes (76781) 15   Symptoms Noted During/After Treatment None   Treatment Detail/Skilled Intervention PMSV: Pt was slow to respond and did not respond to some questions and cues/commands at times.  Mod-max cues given for pt to produce verbal language/speech.   Pt stated \"Reymundo\" for 'Surinder\", didn't name his address, but said [place], stated chair when asked if he wanted to be in the bed vs chair, pt stated his head hurt, thirsty, said first name and last with encouragement.  Gurgly wet weak voicing observed initially, then pt brought up some mucus to the oral cavity.  Voicing was weak with only slight sound produced above a whisper.  Educated pt and RN regarding PMSV use and parameters.  Pt will need continued education due to decreased recall.   Total Session Time   Total Session Time (sum of timed and untimed services) 30     "

## 2022-12-13 NOTE — PROGRESS NOTES
Infection Prevention Note:  Patient had Herpes Symplex sores (cold sores) on lip, nose and face.  Once the open sores have fully scabbed over and are no longer oozing, the patient is no long shedding the virus.  Based on the [facility] nurse images and speaking with the nurse, these sores look to be healing well and have no oozing.  It is acceptable to come out of contact precautions.  Marilynn Castillo, Infection Prevention on 12/13/2022 at 12:01 PM

## 2022-12-13 NOTE — PROGRESS NOTES
[facility] ICU RESPIRATORY NOTE        Date of Admission: 11/12/2022    Date of Intubation (most recent): 11/26/2022.     Reason for Mechanical Ventilation: Respiratory Failure.     Number of Days on Mechanical Ventilation: Day 17.     Met Criteria for Spontaneous Breathing Trial: Yes.     Significant Events Today: Patient placed on trach dome 30LPM 40% at 1100. PMVT with speech at 1430 for 10 minutes.     ABG Results:   Recent Labs   Lab 12/10/22  2022   O2PER 30       Current Vent Settings: Vent Mode: CMV/AC  (Continuous Mandatory Ventilation/ Assist Control)  FiO2 (%): 40 %  Resp Rate (Set): 16 breaths/min  Tidal Volume (Set, mL): 450 mL  PEEP (cm H2O): 5 cmH2O  Resp: 22      Skin Assessment: Bruises on neck.     Plan: Patient to stay on trach dome for 14 hours and be placed back on full-support at midnight.     Moises Martinez, RRT on 12/13/2022 at 5:10 PM

## 2022-12-13 NOTE — PROGRESS NOTES
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 Acute respiratory failure - he remains on 30% and +5 PEEP, trach in place and receiving pulmonary hygiene measures. He tolerates about 12 hours of trach dome per day and will lengthen this as tolerated.  He had a chest tube placed today for right sided pneumothorax  2. Pneumonia - strep and parainfluenza; He has completed antibiotics;  monitor closely.   3. ID- voriconazole off; completed 15 days of Rocephin  4. Shock - off vasopressors  5. Acute renal failure - HD  6. S/p initial arrest and prolonged resuscitation (11.9) and hypothermia resuscitation   7. History of Liver Transplant 2016 - tacrolimus at 1 mg bid and on solucortef. Will have Tx service reassess rejection meds. Will check tacrolimus level tomorrow.  8. History of HTN  9. CHRISTIAN on BIPAP when not on vent   10. Nutrition - enteral resumed via PEG   11. CNS- awake and following commands, long term CNS prognosis is likely favorable.  12. Atrial fibrillation, likely paroxysmal and with concern of prior CVA's will continue with apixaban at 5 BID   13. Platelets 76k and rising; follow only, and obvious explanation is not yet clear            Summary/Hospital Course:           Assessment  and plan :     Balnco Osborne IS a 57 year old male admitted on 11/12/2022 for acute respiratory failure   I have personally reviewed the daily labs, imaging studies, cultures and discussed the case with referring physician and consulting physicians.     My assessment and plan by system for this patient is as follows:    Neurology/Psychiatry:   1. He remains very weak and gets therapy     Cardiovascular:   1.Hemodynamics - well compensated     Pulmonary/Ventilator Management:   1. On 40%, +5 PEEP    GI and Nutrition :   1. Enteral nutrition     Renal/Fluids/Electrolytes:   1. He remains on HD    Infectious Disease:   1. Off antibiotics     Endocrine:   1. No changes today     Hematology/Oncology:   1. Hb 7.2     IV/Access:   1. Venous access -   2. Arterial access -   3.  Plan  - central access required and necessary      ICU Prophylaxis:   1. DVT: Hep Subq/ LMWH/mechanical  2. VAP: HOB 30 degrees, chlorhexidine rinse  3. Stress Ulcer: PPI/H2 blocker  4. Restraints: Nonviolent soft two point restraints required and necessary for patient safety and continued cares and good effect as patient continues to pull at necessary lines, tubes despite education and distraction. Will readdress daily.   5. IV Access - central access required and necessary for continued patient cares  6. Feeding - enteral nutrition   7. Family Update: deferred today  8. Disposition - ICU care         Key goals for next 24 hours:   1. Chest tube placement   2.  3.               Interim History:              Key Medications:       - MEDICATION INSTRUCTIONS for Dialysis Patients -   Does not apply See Admin Instructions     acetylcysteine  2 mL Nebulization BID     acyclovir   Topical Q8H     [Held by provider] apixaban ANTICOAGULANT  5 mg Oral or Feeding Tube BID     artificial tears  1 drop Both Eyes TID     chlorhexidine  15 mL Mouth/Throat Q12H     folic acid  1 mg Intravenous Daily     heparin lock flush  5-20 mL Intracatheter Q24H      hydrocortisone sodium succinate PF  25 mg Intravenous Q6H     insulin aspart  1-12 Units Subcutaneous Q4H     insulin glargine  30 Units Subcutaneous QAM AC     ipratropium - albuterol 0.5 mg/2.5 mg/3 mL  3 mL Nebulization 4x daily     lactulose  20 g Oral or Feeding Tube TID     midodrine  5 mg Oral or Feeding Tube TID w/meals     multivitamins w/minerals  15 mL Per Feeding Tube Daily     pantoprazole  40 mg Per Feeding Tube QAM AC    Or     pantoprazole  40 mg Intravenous QAM AC     rifaximin  550 mg Per Feeding Tube BID     sodium chloride (PF)  10-40 mL Intracatheter Q8H     tacrolimus  1 mg Oral or Feeding Tube BID       dextrose       - MEDICATION INSTRUCTIONS -       - MEDICATION INSTRUCTIONS -       norepinephrine 0.03 mcg/kg/min (12/12/22 2002)     sodium chloride 10 mL/hr at 12/09/22 2000               Physical Examination:   Temp:  [97.2  F (36.2  C)-99  F (37.2  C)] 97.2  F (36.2  C)  Pulse:  [] 92  Resp:  [14-33] 16  BP: ()/(50-87) 77/50  FiO2 (%):  [40 %-100 %] 40 %  SpO2:  [93 %-100 %] 99 %    Intake/Output Summary (Last 24 hours) at 12/12/2022 2045  Last data filed at 12/12/2022 1848  Gross per 24 hour   Intake 1520 ml   Output 400 ml   Net 1120 ml     Wt Readings from Last 4 Encounters:   12/12/22 85.2 kg (187 lb 13.3 oz)   10/07/19 137.5 kg (303 lb 3.2 oz)   10/04/19 131.5 kg (290 lb)   02/20/19 140.4 kg (309 lb 9.6 oz)     BP - Mean:  [] 60  Vent Mode: CMV/AC  (Continuous Mandatory Ventilation/ Assist Control)  FiO2 (%): 40 %  Resp Rate (Set): 16 breaths/min  Tidal Volume (Set, mL): 450 mL  PEEP (cm H2O): 5 cmH2O  Resp: 16    Recent Labs   Lab 12/10/22  2022 12/05/22  2251   O2PER 30 40       GEN: no acute distress   HEENT: head ncat, sclera anicteric, OP patent, trachea midline   PULM: unlabored synchronous with vent, clear anteriorly    CV/COR: RRR S1S2 no gallop,  No rub, no murmur  ABD: soft nontender,   EXT:  Minimal edema   warm  NEURO: weak throughout   SKIN: no obvious  rash; no cyanosis or mottling   LINES: clean, dry intact         Data:   All data and imaging reviewed     ROUTINE ICU LABS (Last four results)  CMP  Recent Labs   Lab 12/12/22  1600 12/12/22  1153 12/12/22  0757 12/12/22  0431 12/10/22  2024 12/10/22  2022 12/10/22  1216 12/10/22  1017 12/09/22  0745 12/09/22  0423 12/08/22  0814 12/08/22  0446 12/07/22  0454 12/07/22  0448 12/06/22  0520 12/06/22  0513   NA  --   --   --  138  --  138  --  135  --  136  --  137  --  138  --  136   POTASSIUM  --   --   --  4.1  --  3.5  --  4.2  --  3.6  --  3.9  --  3.6  --  3.8   CHLORIDE  --   --   --  101  --  103  --  98  --  99  --  100  --  101  --  98   CO2  --   --   --  27  --  28  --  26  --  28  --  29  --  27  --  25   ANIONGAP  --   --   --  10  --  7  --  11  --  9  --  8  --  10  --  13   * 203* 166* 188*  176*   < > 166*   < > 256*   < > 203*   < > 183*   < > 190*   < > 178*   BUN  --   --   --  104*  --  64*  --  110*  --  70*  --  84*  --  57*  --  95*   CR  --   --   --  3.48*  --  2.24*  --  3.60*  --  2.64*  --  3.51*  --  2.51*  --  3.80*   GFRESTIMATED  --   --   --  20*  --  33*  --  19*  --  27*  --  19*  --  29*  --  18*   KELLY  --   --   --  8.0*  --  7.3*  --  8.1*  --  8.4*  --  7.8*  --  8.2*  --  7.9*   MAG  --   --   --  2.1  --   --   --   --   --   --   --   --   --   --   --   --    PHOS  --   --   --  5.0*  --   --   --   --   --   --   --  3.3  --  2.2*  --  4.3   PROTTOTAL  --   --   --  5.0*  --   --   --   --   --  5.7*  --   --   --   --   --   --    ALBUMIN  --   --   --  1.6*  --   --   --   --   --  2.0*  --  1.9*  --  1.9*  --  1.9*   BILITOTAL  --   --   --  0.8  --   --   --   --   --  0.9  --   --   --   --   --   --    ALKPHOS  --   --   --  241*  --   --   --   --   --  301*  --   --   --   --   --   --    AST  --   --   --  21  --   --   --   --   --  20  --   --   --   --   --   --    ALT  --   --   --  15  --   --   --   --   --  20  --   --   --   --   --   --     < > =  values in this interval not displayed.     CBC  Recent Labs   Lab 12/12/22  0526 12/12/22  0431 12/10/22  1017 12/09/22  0423 12/08/22  2157   WBC  --  3.2* 6.4 7.5 6.1   RBC  --  2.05* 2.55* 2.61* 2.29*   HGB 7.2* 6.9* 8.7* 9.0* 7.9*   HCT  --  23.1* 27.8* 30.0* 26.1*   MCV  --  113* 109* 115* 114*   MCH  --  33.7* 34.1* 34.5* 34.5*   MCHC  --  29.9* 31.3* 30.0* 30.3*   RDW  --  17.1* 18.3* 19.0* 18.7*   PLT  --  49* 70* 76* 60*     INR  Recent Labs   Lab 12/06/22  0513   INR 1.16*     Arterial Blood Gas  Recent Labs   Lab 12/10/22  2022 12/05/22  2251   O2PER 30 40       All cultures:  No results for input(s): CULT in the last 168 hours.  Recent Results (from the past 24 hour(s))   IR Chest Tube Place Non Tunneled Right    Narrative    DATE: 12/12/2022    PROCEDURE:  1. ULTRASOUND AND FLUOROSCOPICALLY GUIDED CHEST TUBE PLACEMENT    INTERVENTIONAL RADIOLOGIST: Cherry Cornell MD    INDICATION: Right hydropneumothorax, plan for image guided chest tube  placement    CONSENT: The risks, benefits and alternatives of the procedure were  discussed with the patient or representative in detail. All questions  were answered. Informed consent was given to proceed with the  procedure.    MODERATE SEDATION: None.    FLUOROSCOPIC TIME: 1.9 minutes.  RADIATION DOSE: Air Kerma: 6 mGy.    COMPLICATIONS: No immediate complications.    UNIVERSAL PROTOCOL: The operative site was marked and any prior  imaging was reviewed. Required items including blood products,  implants, devices and special equipment was made available. Patient  identity was confirmed either verbally, with demographic information,  hospital assigned identification or other identification markers. A  timeout was performed immediately prior to the procedure.    STERILE BARRIER TECHNIQUE: Maximum sterile barrier technique was used.  Cutaneous antisepsis was performed at the operative site with  application of 2% chlorhexidine and large sterile drape. Prior to  the  procedure, the  and assistant performed hand hygiene and wore  hat, mask, sterile gown, and sterile gloves during the entire  procedure.    PROCEDURE:  Utilizing fluoroscopic imaging, a  image was obtained. The skin  was anesthetized using 1% lidocaine. A Indigo Identityware needle/catheter  combination was advanced under real-time ultrasound and fluoroscopic  guidance into the right pleural space. A permanent image was stored to  the patient's medical record. A sterile probe cover and sterile gel  were used. The needle was removed. Through the needle, a 0.035 inch  guidewire was coiled within the pleural space. The catheter was  removed. The tract was serially dilated. Over the wire, a 10 Fr  pigtail drainage catheter was placed. The pigtail locking mechanism  was engaged. The catheter was then attached to a Pleur-Evac device.  The catheter was secured to the skin utilizing a suture. A clean and  sterile dressing was applied. The patient tolerated the procedure  well.     FINDINGS:   imaging demonstrated a right hydropneumothorax. Postplacement  fluoroscopic imaging demonstrates good positioning of the pigtail  drainage catheter.       Impression    IMPRESSION:    1.  Uncomplicated image guided placement of 10 Mong[place]ian right pleural  drainage catheter. Drainage catheter attached to Pleur-Evac.     SUZIE FLORENCE MD         SYSTEM ID:  [id number]       MD Jessica    Billing: This patient is critically ill: Yes. Total critical care time today 35 min.

## 2022-12-13 NOTE — PROGRESS NOTES
CLINICAL NUTRITION SERVICES - REASSESSMENT NOTE      Malnutrition:   % Weight Loss:  > 5% in 1 month (severe malnutrition)  % Intake:  No decreased intake noted  Subcutaneous Fat Loss:  Orbital region severe depletion, Upper arm region severe depletion and Thoracic region severe depletion  Muscle Loss:  Temporal region severe depletion, Clavicle bone region severe depletion, Patellar region severe depletion, Anterior thigh region severe depletion and Posterior calf region severe depletion  Fluid Retention:  mild (12/13)     Malnutrition Diagnosis: Severe malnutrition  In Context of:  Acute illness or injury       EVALUATION OF PROGRESS TOWARD GOALS   Diet: NPO, trached     Nutrition Support: TF increased on 12/9 as outlined below   Nutrition Support Enteral:  Type of Feeding Tube: 14 Icelandic G-tube   Enteral Frequency:  Continuous  Enteral Regimen: Novasource Renal at 60 mL/hr  Total Enteral Provisions: 2880 kcal (35 kcal/kg), 131 g protein (1.6 g/kg), 264 g CHO, 0 g fiber, 1032 mL H2O   Free Water Flush: 30 mL every 4 hours   Liquids MVI/M daily     Intake/Tolerance:    Labs reviewed: Na 136, K 3.8, Mg 2.1, Phos 4.1 -- WNL, Ammonia 57 (H), BUN 72 (H), Cr 2.55 (H)  Recent Labs   Lab 12/13/22  1235 12/13/22  0748 12/13/22  0427 12/13/22  0004 12/12/22  2100   * 149* 222*  210* 184* 173*     Medications reviewed: HSSI + 30 lantus, lactulose (held), Norepi stopped today  Stooling:  - 12/9: 50 mL (RT removed)  - 12/10: BM x1  - 12/11: BM x4  - 12/12: BM x2  I/O 1691/400  Wt: 192 lbs .33 oz  Vitals:    12/09/22 0215 12/10/22 0200 12/11/22 0400 12/12/22 0200   Weight: 85 kg (187 lb 6.3 oz) 84.5 kg (186 lb 4.6 oz) 86.3 kg (190 lb 4.1 oz) 85.2 kg (187 lb 13.3 oz)    12/13/22 0400   Weight: 87.1 kg (192 lb 0.3 oz)       ASSESSED NUTRITION NEEDS:  Dosing Weight:  83.3 kg (12/7)  Energy Needs: 8142-3921 kcals (30-35 Kcal/Kg): repletion      Protein Needs: 125-165 grams protein (1.5-2 g pro/Kg): hypercatabolism with  critical illness and repletion        NEW FINDINGS:   Chart reviewed and discussed in rounds   [place] vidal - PI on philtrum/columella - healing; herpetic lesion R nose - healing; herpetic lesions on lip and chin - healed   No CRRT, plan HD tomorrow   Trach dome started   Chest tube placed 12/12    Previous Goals:   TF will meet % needs   Evaluation: Met  No further weight loss beyond 83 kg  Evaluation: Met    Previous Nutrition Diagnosis:   Malnutrition related to acute illness process as evidenced by muscle, fat loss, with coding for severe malnutrition   Evaluation: No change      CURRENT NUTRITION DIAGNOSIS  Malnutrition related to acute illness process as evidenced by muscle, fat loss, with coding for severe malnutrition     INTERVENTIONS  Recommendations / Nutrition Prescription  Continue TF as ordered     Implementation  Collaboration and Referral of Nutrition care: patient was discussed during ICU rounds     Goals  TF will meet % needs   No further weight loss beyond 83 kg    MONITORING AND EVALUATION:  Progress towards goals will be monitored and evaluated per protocol and Practice Guidelines      Deann Rainey RD, LD  Clinical Dietitian

## 2022-12-13 NOTE — PROGRESS NOTES
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 Acute respiratory failure - he remains on 30% and +5 PEEP, trach in place, receiving pulmonary hygiene measures, and now tolerateing about 12-14 hours of trach dome per day; will lengthen as tolerated.  He had a chest tube in place for right sided pneumothorax  2. Pneumonia - strep and parainfluenza; completed antibiotics;  monitor closely.   3. ID- voriconazole off; completed 15 days of Rocephin  4. Shock - off vasopressors  5. Acute renal failure - HD  6. S/p initial arrest and prolonged resuscitation (11.9) and hypothermia resuscitation   7. History of Liver Transplant 2016 - tacrolimus at 1 mg bid and on solucortef. Will have Tx service reassess rejection meds. Will check tacrolimus level.  8. History of HTN  9. CHRISTIAN on BIPAP when not on vent   10. Nutrition - enteral resumed via PEG   11. CNS- awake and following commands, long term CNS prognosis is likely favorable.  12. Atrial fibrillation, likely paroxysmal and with concern of prior CVA's will continue with apixaban at 5 BID   13. Platelets 63k and WBC 4.6; follow only           Summary/Hospital Course:           Assessment and plan :     Blanco Osborne IS a 57 year old male admitted  Spoke to Paulo Singh - son  On [phone]  Updated on plan on 11/12/2022 for acute respiratory failure.   I have personally reviewed the daily labs, imaging studies, cultures and discussed the case with referring physician and consulting physicians.     My assessment and plan by system for this patient is as follows:    Neurology/Psychiatry:   1. Awake and following commands     Cardiovascular:   1.Hemodynamics - well compensated;     Pulmonary/Ventilator Management:   1. On 30%, +5; trach dome trials     GI and Nutrition :   1. Enteral nutrition     Renal/Fluids/Electrolytes:   1. Intermittent HD    Infectious Disease:   1. Off antibiotics     Endocrine:   1. Glucoses ok     Hematology/Oncology:   1. Hb about 7 and receiving RBC's every few days.      IV/Access:   1. Venous access -   2. Arterial access -   3.  Plan  - central access required and necessary      ICU Prophylaxis:   1. DVT: Hep Subq/ LMWH/mechanical  2. VAP: HOB 30 degrees, chlorhexidine rinse  3. Stress Ulcer: PPI/H2 blocker  4. Restraints: Nonviolent soft two point restraints required and necessary for patient safety and continued cares and good effect as patient continues to pull at necessary lines, tubes despite education and distraction. Will readdress daily.   5. IV Access - central access required and necessary for continued patient cares  6. Feeding - enteral   7. Family Update: discussed with wife at bedside   8. Disposition - ICU care        Key goals for next 24 hours:   1. No significant changes   2.  3.               Interim History:              Key Medications:       - MEDICATION INSTRUCTIONS for Dialysis Patients -   Does not apply See Admin Instructions     acetylcysteine  2 mL Nebulization BID     acyclovir   Topical Q8H     apixaban ANTICOAGULANT  5 mg Oral or Feeding Tube BID     artificial tears  1 drop Both Eyes TID     chlorhexidine  15 mL Mouth/Throat Q12H     [START ON 12/14/2022] epoetin mirta-epbx  10,000 Units Intravenous Once in dialysis/CRRT     folic acid  1 mg Intravenous Daily      heparin lock flush  5-20 mL Intracatheter Q24H     hydrocortisone sodium succinate PF  25 mg Intravenous Q6H     insulin aspart  1-12 Units Subcutaneous Q4H     insulin glargine  30 Units Subcutaneous QAM AC     ipratropium - albuterol 0.5 mg/2.5 mg/3 mL  3 mL Nebulization 4x daily     lactulose  20 g Oral or Feeding Tube TID     midodrine  10 mg Oral or Feeding Tube TID w/meals     multivitamins w/minerals  15 mL Per Feeding Tube Daily     pantoprazole  40 mg Per Feeding Tube QAM AC    Or     pantoprazole  40 mg Intravenous QAM AC     rifaximin  550 mg Per Feeding Tube BID     sodium chloride (PF)  10-40 mL Intracatheter Q8H     tacrolimus  1 mg Oral or Feeding Tube BID       dextrose       - MEDICATION INSTRUCTIONS -       - MEDICATION INSTRUCTIONS -       norepinephrine Stopped (12/13/22 1129)     sodium chloride 10 mL/hr at 12/09/22 2000               Physical Examination:   Temp:  [97.2  F (36.2  C)-99  F (37.2  C)] 98.8  F (37.1  C)  Pulse:  [] 98  Resp:  [13-33] 22  BP: ()/(50-88) 129/86  FiO2 (%):  [40 %] 40 %  SpO2:  [94 %-100 %] 99 %    Intake/Output Summary (Last 24 hours) at 12/13/2022 1752  Last data filed at 12/13/2022 1215  Gross per 24 hour   Intake 1771.64 ml   Output 400 ml   Net 1371.64 ml     Wt Readings from Last 4 Encounters:   12/13/22 87.1 kg (192 lb 0.3 oz)   10/07/19 137.5 kg (303 lb 3.2 oz)   10/04/19 131.5 kg (290 lb)   02/20/19 140.4 kg (309 lb 9.6 oz)     BP - Mean:  [] 99  Vent Mode: CMV/AC  (Continuous Mandatory Ventilation/ Assist Control)  FiO2 (%): 40 %  Resp Rate (Set): 16 breaths/min  Tidal Volume (Set, mL): 450 mL  PEEP (cm H2O): 5 cmH2O  Resp: 22    Recent Labs   Lab 12/10/22  2022   O2PER 30       GEN: no acute distress   HEENT: head ncat, sclera anicteric, OP patent, trachea midline   PULM: unlabored synchronous with vent, clear anteriorly    CV/COR: RRR S1S2 no gallop,  No rub, no murmur  ABD: soft nontender, hypoactive bowel sounds, no mass  EXT:   Edema   warm  NEURO: grossly intact  SKIN: no obvious rash  LINES: clean, dry intact         Data:   All data and imaging reviewed     ROUTINE ICU LABS (Last four results)  CMP  Recent Labs   Lab 12/13/22  1656 12/13/22  1235 12/13/22  0748 12/13/22  0427 12/12/22  0757 12/12/22  0431 12/10/22  2024 12/10/22  2022 12/10/22  1216 12/10/22  1017 12/09/22  0745 12/09/22  0423 12/08/22  0814 12/08/22  0446 12/07/22  0454 12/07/22 0448   NA  --   --   --  136  --  138  --  138  --  135  --  136  --  137  --  138   POTASSIUM  --   --   --  3.8  --  4.1  --  3.5  --  4.2  --  3.6  --  3.9  --  3.6   CHLORIDE  --   --   --  101  --  101  --  103  --  98  --  99  --  100  --  101   CO2  --   --   --  29  --  27  --  28  --  26  --  28  --  29  --  27   ANIONGAP  --   --   --  6  --  10  --  7  --  11  --  9  --  8  --  10   * 207* 149* 222*  210*   < > 188*  176*   < > 166*   < > 256*   < > 203*   < > 183*   < > 190*   BUN  --   --   --  72*  --  104*  --  64*  --  110*  --  70*  --  84*  --  57*   CR  --   --   --  2.55*  --  3.48*  --  2.24*  --  3.60*  --  2.64*  --  3.51*  --  2.51*   GFRESTIMATED  --   --   --  28*  --  20*  --  33*  --  19*  --  27*  --  19*  --  29*   KELLY  --   --   --  7.4*  --  8.0*  --  7.3*  --  8.1*  --  8.4*  --  7.8*  --  8.2*   MAG  --   --   --   --   --  2.1  --   --   --   --   --   --   --   --   --   --    PHOS  --   --   --  4.1  --  5.0*  --   --   --   --   --   --   --  3.3  --  2.2*   PROTTOTAL  --   --   --   --   --  5.0*  --   --   --   --   --  5.7*  --   --   --   --    ALBUMIN  --   --   --  1.7*  --  1.6*  --   --   --   --   --  2.0*  --  1.9*  --  1.9*   BILITOTAL  --   --   --   --   --  0.8  --   --   --   --   --  0.9  --   --   --   --    ALKPHOS  --   --   --   --   --  241*  --   --   --   --   --  301*  --   --   --   --    AST  --   --   --   --   --  21  --   --   --   --   --  20  --   --   --   --    ALT  --   --   --   --   --  15  --   --   --   --    --  20  --   --   --   --     < > = values in this interval not displayed.     CBC  Recent Labs   Lab 12/13/22  0427 12/12/22  0526 12/12/22  0431 12/10/22  1017 12/09/22  0423   WBC 4.6  --  3.2* 6.4 7.5   RBC 1.95*  --  2.05* 2.55* 2.61*   HGB 6.5* 7.2* 6.9* 8.7* 9.0*   HCT 22.0*  --  23.1* 27.8* 30.0*   *  --  113* 109* 115*   MCH 33.3*  --  33.7* 34.1* 34.5*   MCHC 29.5*  --  29.9* 31.3* 30.0*   RDW 17.0*  --  17.1* 18.3* 19.0*   PLT 63*  --  49* 70* 76*     INRNo lab results found in last 7 days.  Arterial Blood Gas  Recent Labs   Lab 12/10/22  2022   O2PER 30       All cultures:  No results for input(s): CULT in the last 168 hours.  Recent Results (from the past 24 hour(s))   XR Abdomen Port 1 View    Narrative    EXAM: ABDOMEN 2 VIEWS  LOCATION: [facility]  DATE/TIME: 12/13/2022 5:54 AM    INDICATION: Distended abdomen.    COMPARISON: None.    FINDINGS: Gas is scattered within nondilated small and large bowel. No significant stool is visualized in the colon. No visualized bowel wall thickening or pneumatosis. A percutaneous gastrostomy tube is present with distal balloon tip projecting over   the gastric body. Surgical clips in the upper right hemiabdomen may relate to prior cholecystectomy. Blunting of the right costophrenic angle that could relate to a very small right pleural effusion.      Impression    IMPRESSION: No convincing evidence of bowel obstruction or constipation.              XR Chest Port 1 View    Narrative    EXAM: CHEST SINGLE VIEW PORTABLE  LOCATION: [facility]  DATE/TIME: 12/13/2022 5:57 AM    INDICATION: Assess pneumothorax.  COMPARISON: 12/11/2022 at 0620 hours.      Impression    IMPRESSION:   1. Interval placement of a right pleural drain with distal tip projecting over the lateral aspect of the lower right hemithorax.  2. Interval complete resolution of what was a small right basilar pneumothorax since the recent comparison  study.  3. Tiny right pleural effusion and small left pleural effusion again noted.  4. No other significant interval change.  5. An endotracheal tube, right upper extremity peripherally inserted central catheter and a right internal jugular dual-lumen central venous catheter are again noted.                  MD Jessica    Billing: This patient is critically ill: Yes. Total critical care time today 35 min.

## 2022-12-13 NOTE — PROGRESS NOTES
Pending sale to [facility] ICU RESPIRATORY NOTE        Date of Admission: 11/12/2022    Date of Intubation (most recent):11/26/22    Reason for Mechanical Ventilation:Respiratory failure    Number of Days on Mechanical Ventilation:16    Met Criteria for Spontaneous Breathing Trial:Yes    Reason for No Spontaneous Breathing Trial:PS not done this afternoon after chest tube placement due to pt receiving dialysis.    Significant Events Today:BID Volera treatment not done this morning due to pt having untreated pneumothorax.  Pt was transported to IR for chest tube placement. Pt tolerated transport well.     ABG Results:   Recent Labs   Lab 12/10/22  2022 12/05/22  2251   O2PER 30 40       Current Vent Settings: Vent Mode: CMV/AC  (Continuous Mandatory Ventilation/ Assist Control)  FiO2 (%): 40 %  Resp Rate (Set): 16 breaths/min  Tidal Volume (Set, mL): 450 mL  PEEP (cm H2O): 5 cmH2O  Resp: 19      Skin Assessment:Trach site intact with some oozing secretions and bruising around the neck.    Plan:Will cont full vent support overnight and will assess for a trach dome trial in the morning.    Kelly Kim RT on 12/12/2022 at 6:20 PM

## 2022-12-13 NOTE — PROGRESS NOTES
Shift [id number]      Pt lethargic but arousable to voice, follows commands, no complaints of shortness of breath during shift. Pt complained of pain once during shift and received 5 mg Oxy per order, with effect. Chest tube had no output during shift, x-ray this AM please see results. Pt placed on levo due to MAPs less than 65. Levo @ 0.04. No new skin issues noted during shift.    Halina Moore RN

## 2022-12-13 NOTE — PROGRESS NOTES
Renal Medicine Progress Note            Assessment/Plan:     Blanco Osborne is a 57-year-old male with PMH CARRILLO s/p liver transplant (9/2016) and cirrhosis admitted 11/12/2022 with out of hospital PEA arrest and acute hypoxemic respiratory failure. Course complicated by parainfluenza virus, streptococcal bacteremia, and LORETTA requiring CRRT.         1.  Severe anuric LORETTA:               -CRRT stopped 11/22 and resume on 11/24 and stopped 11/26.               -IHD started 11/29/30.                2.  s/p Septic shock with MODS:                -intermittant levophed for hypotension               - on 5mg midodrine TID,  Has room to increase      3.  Respiratory failure:  now trach'ed              -R Pneumothorax  - s/p CHest tube               -Aspergillus PNA              4.  ESLD due to CARRILLO s/p liver transplant                - tacrolimus 1mg BID, dosing per GI     5.  Hypoalbuminemia      6. Anemia:   6.5 today. Starting epo with dialysis.         Plan/Recs:  1) Continue MWF dialysis .    No indications for dialysis today (IHD or CRRT)    HD planned tomorrow. Does not appear very volume up at all.  2) I increased midodrine to 10mg TID, wean levophed as able  3) dispo to LTACH, continue HD there and monitor for recovery. No signs of renal recovery to date       Torsten Montaño DO  [facility]  Office: [phone]  Cell: [phone]        Interval History:      s/p chest tube placement yesterday. Ran yesterday 3 hrs on dialysis, 0.4kg volume removed.          Medications and Allergies:       - MEDICATION INSTRUCTIONS for Dialysis Patients -   Does not apply See Admin Instructions     acetylcysteine  2 mL Nebulization BID     acyclovir   Topical Q8H     [Held by provider] apixaban ANTICOAGULANT  5 mg Oral or Feeding Tube BID     artificial tears  1 drop Both Eyes TID     chlorhexidine  15 mL Mouth/Throat Q12H     folic acid  1 mg Intravenous Daily     heparin lock flush  5-20 mL Intracatheter Q24H      hydrocortisone sodium succinate PF  25 mg Intravenous Q6H     insulin aspart  1-12 Units Subcutaneous Q4H     insulin glargine  30 Units Subcutaneous QAM AC     ipratropium - albuterol 0.5 mg/2.5 mg/3 mL  3 mL Nebulization 4x daily     lactulose  20 g Oral or Feeding Tube TID     midodrine  10 mg Oral or Feeding Tube TID w/meals     multivitamins w/minerals  15 mL Per Feeding Tube Daily     pantoprazole  40 mg Per Feeding Tube QAM AC    Or     pantoprazole  40 mg Intravenous QAM AC     rifaximin  550 mg Per Feeding Tube BID     sodium chloride (PF)  10-40 mL Intracatheter Q8H     tacrolimus  1 mg Oral or Feeding Tube BID      No Known Allergies         Physical Exam:   Vitals were reviewed  /88   Pulse 97   Temp 97.7  F (36.5  C) (Oral)   Resp 27   Ht 1.829 m (6')   Wt 87.1 kg (192 lb 0.3 oz)   SpO2 100%   BMI 26.04 kg/m      Wt Readings from Last 3 Encounters:   12/13/22 87.1 kg (192 lb 0.3 oz)   10/07/19 137.5 kg (303 lb 3.2 oz)   10/04/19 131.5 kg (290 lb)       Intake/Output Summary (Last 24 hours) at 12/13/2022 0857  Last data filed at 12/13/2022 0600  Gross per 24 hour   Intake 1630.7 ml   Output 400 ml   Net 1230.7 ml       GENERAL APPEARANCE: awake, alert.   HEENT:  Trach present  RESP:  Few coarse sounds  CV: tachy, regular  ABDOMEN mild  distension, non-tender  EXTREMITIES/SKIN: no c/c/rashes/lesions; no noted dependent edema  LINES:  Right tunneled dialysis catheter present                 Data:     BMP  Recent Labs   Lab 12/13/22  0748 12/13/22  0427 12/13/22  0004 12/12/22  0757 12/12/22  0431 12/10/22  2024 12/10/22  2022 12/10/22  1216 12/10/22  1017   NA  --  136  --   --  138  --  138  --  135   POTASSIUM  --  3.8  --   --  4.1  --  3.5  --  4.2   CHLORIDE  --  101  --   --  101  --  103  --  98   KELLY  --  7.4*  --   --  8.0*  --  7.3*  --  8.1*   CO2  --  29  --   --  27  --  28  --  26   BUN  --  72*  --   --  104*  --  64*  --  110*   CR  --  2.55*  --   --  3.48*  --  2.24*  --   3.60*   * 222*  210* 184*   < > 188*  176*   < > 166*   < > 256*    < > = values in this interval not displayed.     CBC  Recent Labs   Lab 12/13/22  0427 12/12/22  0526 12/12/22  0431 12/10/22  1017 12/09/22  0423   WBC 4.6  --  3.2* 6.4 7.5   HGB 6.5* 7.2* 6.9* 8.7* 9.0*   HCT 22.0*  --  23.1* 27.8* 30.0*   *  --  113* 109* 115*   PLT 63*  --  49* 70* 76*     Lab Results   Component Value Date    AST 21 12/12/2022    ALT 15 12/12/2022    ALKPHOS 241 (H) 12/12/2022    BILITOTAL 0.8 12/12/2022    CHANDLER 57 (H) 12/12/2022     Lab Results   Component Value Date    INR 1.16 (H) 12/06/2022       Attestation:  I have reviewed today's vital signs, notes, medications, labs and imaging.    DO [facility] - Nephrology  Office: [phone]  Cell: [phone]

## 2022-12-14 ENCOUNTER — APPOINTMENT (OUTPATIENT)
Dept: SPEECH THERAPY | Facility: CLINIC | Age: 57
DRG: 004 | End: 2022-12-14
Attending: STUDENT IN AN ORGANIZED HEALTH CARE EDUCATION/TRAINING PROGRAM
Payer: COMMERCIAL

## 2022-12-14 ENCOUNTER — APPOINTMENT (OUTPATIENT)
Dept: OCCUPATIONAL THERAPY | Facility: CLINIC | Age: 57
DRG: 004 | End: 2022-12-14
Attending: STUDENT IN AN ORGANIZED HEALTH CARE EDUCATION/TRAINING PROGRAM
Payer: COMMERCIAL

## 2022-12-14 LAB
ANION GAP SERPL CALCULATED.3IONS-SCNC: 9 MMOL/L (ref 3–14)
BUN SERPL-MCNC: 96 MG/DL (ref 7–30)
CALCIUM SERPL-MCNC: 7.9 MG/DL (ref 8.5–10.1)
CHLORIDE BLD-SCNC: 99 MMOL/L (ref 94–109)
CO2 SERPL-SCNC: 28 MMOL/L (ref 20–32)
CREAT SERPL-MCNC: 3.36 MG/DL (ref 0.66–1.25)
ERYTHROCYTE [DISTWIDTH] IN BLOOD BY AUTOMATED COUNT: 18.6 % (ref 10–15)
FOLATE SERPL-MCNC: >40 NG/ML (ref 4.6–34.8)
GFR SERPL CREATININE-BSD FRML MDRD: 20 ML/MIN/1.73M2
GLUCOSE BLD-MCNC: 196 MG/DL (ref 70–99)
GLUCOSE BLDC GLUCOMTR-MCNC: 153 MG/DL (ref 70–99)
GLUCOSE BLDC GLUCOMTR-MCNC: 156 MG/DL (ref 70–99)
GLUCOSE BLDC GLUCOMTR-MCNC: 156 MG/DL (ref 70–99)
GLUCOSE BLDC GLUCOMTR-MCNC: 158 MG/DL (ref 70–99)
GLUCOSE BLDC GLUCOMTR-MCNC: 184 MG/DL (ref 70–99)
GLUCOSE BLDC GLUCOMTR-MCNC: 193 MG/DL (ref 70–99)
HCT VFR BLD AUTO: 23.1 % (ref 40–53)
HGB BLD-MCNC: 7 G/DL (ref 13.3–17.7)
MCH RBC QN AUTO: 33.2 PG (ref 26.5–33)
MCHC RBC AUTO-ENTMCNC: 30.3 G/DL (ref 31.5–36.5)
MCV RBC AUTO: 110 FL (ref 78–100)
PLATELET # BLD AUTO: 58 10E3/UL (ref 150–450)
POTASSIUM BLD-SCNC: 4.1 MMOL/L (ref 3.4–5.3)
RBC # BLD AUTO: 2.11 10E6/UL (ref 4.4–5.9)
SODIUM SERPL-SCNC: 136 MMOL/L (ref 133–144)
WBC # BLD AUTO: 2.8 10E3/UL (ref 4–11)

## 2022-12-14 PROCEDURE — 250N000011 HC RX IP 250 OP 636: Performed by: PHYSICIAN ASSISTANT

## 2022-12-14 PROCEDURE — 250N000013 HC RX MED GY IP 250 OP 250 PS 637: Performed by: STUDENT IN AN ORGANIZED HEALTH CARE EDUCATION/TRAINING PROGRAM

## 2022-12-14 PROCEDURE — 250N000013 HC RX MED GY IP 250 OP 250 PS 637: Performed by: PHYSICIAN ASSISTANT

## 2022-12-14 PROCEDURE — 94640 AIRWAY INHALATION TREATMENT: CPT

## 2022-12-14 PROCEDURE — 250N000011 HC RX IP 250 OP 636: Performed by: INTERNAL MEDICINE

## 2022-12-14 PROCEDURE — 250N000009 HC RX 250: Performed by: PHYSICIAN ASSISTANT

## 2022-12-14 PROCEDURE — 250N000013 HC RX MED GY IP 250 OP 250 PS 637: Performed by: INTERNAL MEDICINE

## 2022-12-14 PROCEDURE — 250N000012 HC RX MED GY IP 250 OP 636 PS 637: Performed by: NURSE PRACTITIONER

## 2022-12-14 PROCEDURE — 200N000001 HC R&B ICU

## 2022-12-14 PROCEDURE — 999N000157 HC STATISTIC RCP TIME EA 10 MIN

## 2022-12-14 PROCEDURE — 99291 CRITICAL CARE FIRST HOUR: CPT | Performed by: INTERNAL MEDICINE

## 2022-12-14 PROCEDURE — 250N000012 HC RX MED GY IP 250 OP 636 PS 637: Performed by: INTERNAL MEDICINE

## 2022-12-14 PROCEDURE — 92507 TX SP LANG VOICE COMM INDIV: CPT | Mod: GN

## 2022-12-14 PROCEDURE — 94640 AIRWAY INHALATION TREATMENT: CPT | Mod: 76

## 2022-12-14 PROCEDURE — 250N000009 HC RX 250: Performed by: INTERNAL MEDICINE

## 2022-12-14 PROCEDURE — 250N000009 HC RX 250: Performed by: NURSE PRACTITIONER

## 2022-12-14 PROCEDURE — 94799 UNLISTED PULMONARY SVC/PX: CPT

## 2022-12-14 PROCEDURE — 94003 VENT MGMT INPAT SUBQ DAY: CPT

## 2022-12-14 PROCEDURE — 258N000003 HC RX IP 258 OP 636: Performed by: INTERNAL MEDICINE

## 2022-12-14 PROCEDURE — 97110 THERAPEUTIC EXERCISES: CPT | Mod: GO

## 2022-12-14 PROCEDURE — 999N000009 HC STATISTIC AIRWAY CARE

## 2022-12-14 PROCEDURE — 90935 HEMODIALYSIS ONE EVALUATION: CPT | Performed by: INTERNAL MEDICINE

## 2022-12-14 PROCEDURE — 90937 HEMODIALYSIS REPEATED EVAL: CPT

## 2022-12-14 PROCEDURE — 85027 COMPLETE CBC AUTOMATED: CPT | Performed by: INTERNAL MEDICINE

## 2022-12-14 PROCEDURE — 80048 BASIC METABOLIC PNL TOTAL CA: CPT | Performed by: INTERNAL MEDICINE

## 2022-12-14 PROCEDURE — 82746 ASSAY OF FOLIC ACID SERUM: CPT | Performed by: INTERNAL MEDICINE

## 2022-12-14 RX ADMIN — RIFAXIMIN 550 MG: 550 TABLET ORAL at 08:38

## 2022-12-14 RX ADMIN — INSULIN ASPART 1 UNITS: 100 INJECTION, SOLUTION INTRAVENOUS; SUBCUTANEOUS at 19:51

## 2022-12-14 RX ADMIN — INSULIN ASPART 1 UNITS: 100 INJECTION, SOLUTION INTRAVENOUS; SUBCUTANEOUS at 00:25

## 2022-12-14 RX ADMIN — OXYCODONE HYDROCHLORIDE 5 MG: 5 TABLET ORAL at 13:11

## 2022-12-14 RX ADMIN — APIXABAN 5 MG: 5 TABLET, FILM COATED ORAL at 21:40

## 2022-12-14 RX ADMIN — Medication 15 ML: at 08:39

## 2022-12-14 RX ADMIN — SODIUM CHLORIDE 200 ML: 9 INJECTION, SOLUTION INTRAVENOUS at 09:10

## 2022-12-14 RX ADMIN — OXYCODONE HYDROCHLORIDE 5 MG: 5 TABLET ORAL at 08:41

## 2022-12-14 RX ADMIN — IPRATROPIUM BROMIDE AND ALBUTEROL SULFATE 3 ML: .5; 3 SOLUTION RESPIRATORY (INHALATION) at 08:08

## 2022-12-14 RX ADMIN — ONDANSETRON 4 MG: 2 INJECTION INTRAMUSCULAR; INTRAVENOUS at 08:41

## 2022-12-14 RX ADMIN — INSULIN ASPART 1 UNITS: 100 INJECTION, SOLUTION INTRAVENOUS; SUBCUTANEOUS at 13:18

## 2022-12-14 RX ADMIN — IPRATROPIUM BROMIDE AND ALBUTEROL SULFATE 3 ML: .5; 3 SOLUTION RESPIRATORY (INHALATION) at 11:28

## 2022-12-14 RX ADMIN — ACETYLCYSTEINE 2 ML: 200 SOLUTION ORAL; RESPIRATORY (INHALATION) at 08:08

## 2022-12-14 RX ADMIN — Medication 1 DROP: at 08:38

## 2022-12-14 RX ADMIN — INSULIN GLARGINE 30 UNITS: 100 INJECTION, SOLUTION SUBCUTANEOUS at 09:47

## 2022-12-14 RX ADMIN — Medication: at 09:40

## 2022-12-14 RX ADMIN — HYDROCORTISONE SODIUM SUCCINATE 25 MG: 100 INJECTION, POWDER, FOR SOLUTION INTRAMUSCULAR; INTRAVENOUS at 01:25

## 2022-12-14 RX ADMIN — MIDODRINE HYDROCHLORIDE 10 MG: 5 TABLET ORAL at 13:11

## 2022-12-14 RX ADMIN — MIDODRINE HYDROCHLORIDE 10 MG: 5 TABLET ORAL at 08:38

## 2022-12-14 RX ADMIN — HEPARIN SODIUM 1900 UNITS: 1000 INJECTION INTRAVENOUS; SUBCUTANEOUS at 12:06

## 2022-12-14 RX ADMIN — RIFAXIMIN 550 MG: 550 TABLET ORAL at 21:39

## 2022-12-14 RX ADMIN — TACROLIMUS 1 MG: 5 CAPSULE ORAL at 21:39

## 2022-12-14 RX ADMIN — ACETYLCYSTEINE 2 ML: 200 SOLUTION ORAL; RESPIRATORY (INHALATION) at 20:06

## 2022-12-14 RX ADMIN — Medication 40 MG: at 08:39

## 2022-12-14 RX ADMIN — INSULIN ASPART 2 UNITS: 100 INJECTION, SOLUTION INTRAVENOUS; SUBCUTANEOUS at 04:03

## 2022-12-14 RX ADMIN — IPRATROPIUM BROMIDE AND ALBUTEROL SULFATE 3 ML: .5; 3 SOLUTION RESPIRATORY (INHALATION) at 20:06

## 2022-12-14 RX ADMIN — IPRATROPIUM BROMIDE AND ALBUTEROL SULFATE 3 ML: .5; 3 SOLUTION RESPIRATORY (INHALATION) at 15:31

## 2022-12-14 RX ADMIN — HYDROCORTISONE SODIUM SUCCINATE 25 MG: 100 INJECTION, POWDER, FOR SOLUTION INTRAMUSCULAR; INTRAVENOUS at 19:50

## 2022-12-14 RX ADMIN — APIXABAN 5 MG: 5 TABLET, FILM COATED ORAL at 08:38

## 2022-12-14 RX ADMIN — INSULIN ASPART 3 UNITS: 100 INJECTION, SOLUTION INTRAVENOUS; SUBCUTANEOUS at 09:47

## 2022-12-14 RX ADMIN — HYDROCORTISONE SODIUM SUCCINATE 25 MG: 100 INJECTION, POWDER, FOR SOLUTION INTRAMUSCULAR; INTRAVENOUS at 13:11

## 2022-12-14 RX ADMIN — Medication 1 DROP: at 21:39

## 2022-12-14 RX ADMIN — INSULIN ASPART 1 UNITS: 100 INJECTION, SOLUTION INTRAVENOUS; SUBCUTANEOUS at 15:42

## 2022-12-14 RX ADMIN — TACROLIMUS 1 MG: 5 CAPSULE ORAL at 08:38

## 2022-12-14 RX ADMIN — Medication 1 DROP: at 15:41

## 2022-12-14 RX ADMIN — OXYCODONE HYDROCHLORIDE 5 MG: 5 TABLET ORAL at 01:25

## 2022-12-14 RX ADMIN — HYDROCORTISONE SODIUM SUCCINATE 25 MG: 100 INJECTION, POWDER, FOR SOLUTION INTRAMUSCULAR; INTRAVENOUS at 08:38

## 2022-12-14 RX ADMIN — FOLIC ACID 1 MG: 5 INJECTION, SOLUTION INTRAMUSCULAR; INTRAVENOUS; SUBCUTANEOUS at 08:38

## 2022-12-14 RX ADMIN — MIDODRINE HYDROCHLORIDE 10 MG: 5 TABLET ORAL at 18:17

## 2022-12-14 ASSESSMENT — ACTIVITIES OF DAILY LIVING (ADL)
ADLS_ACUITY_SCORE: 36
ADLS_ACUITY_SCORE: 36
ADLS_ACUITY_SCORE: 34
ADLS_ACUITY_SCORE: 34
ADLS_ACUITY_SCORE: 38
ADLS_ACUITY_SCORE: 36
ADLS_ACUITY_SCORE: 40
ADLS_ACUITY_SCORE: 34
ADLS_ACUITY_SCORE: 36
ADLS_ACUITY_SCORE: 34

## 2022-12-14 NOTE — PROGRESS NOTES
"  [facility] ICU RESPIRATORY NOTE        Date of Admission: 11/12/2022    Date of Intubation (most recent): 11/26/2022    Reason for Mechanical Ventilation: Resp failure    Number of Days on Mechanical Ventilation: 17    Met Criteria for Spontaneous Breathing Trial: Yes    Significant Events Today: Patient wore trach dome 30L 40% all day until ~2300 at which time he asked to be put back on the vent for exhaustion.     He was also found to have the vent circuit disconnected from his trach. When asked why, he said the tubing was \"uncomfortable\". Patient was informed of how important it is to keep the ventilator tubing connected to his trach, he nodded when asked if understood.     ABG Results:   Recent Labs   Lab 12/10/22  2022   O2PER 30       Current Vent Settings: Vent Mode: CMV/AC  (Continuous Mandatory Ventilation/ Assist Control)  FiO2 (%): 40 %  Resp Rate (Set): 16 breaths/min  Tidal Volume (Set, mL): 450 mL  PEEP (cm H2O): 5 cmH2O  Resp: 16      Skin Assessment: Neck bruised. Stoma site oozing secretions. Trach cares performed at start of shift.     Plan: Patient to remain on full support until otherwise decided to return to TD.     Guido Mclain, RT on 12/14/2022 at 5:12 AM    "

## 2022-12-14 NOTE — PROGRESS NOTES
Potassium   Date Value Ref Range Status   12/14/2022 4.1 3.4 - 5.3 mmol/L Final   04/20/2020 3.9 3.4 - 5.3 mmol/L Final     Hemoglobin   Date Value Ref Range Status   12/14/2022 7.0 (L) 13.3 - 17.7 g/dL Final   04/20/2020 14.3 13.3 - 17.7 g/dL Final     Creatinine   Date Value Ref Range Status   12/14/2022 3.36 (H) 0.66 - 1.25 mg/dL Final   04/20/2020 1.06 0.66 - 1.25 mg/dL Final     Urea Nitrogen   Date Value Ref Range Status   12/14/2022 96 (H) 7 - 30 mg/dL Final   04/20/2020 23 7 - 30 mg/dL Final     Sodium   Date Value Ref Range Status   12/14/2022 136 133 - 144 mmol/L Final   04/20/2020 139 133 - 144 mmol/L Final     INR   Date Value Ref Range Status   12/06/2022 1.16 (H) 0.85 - 1.15 Final   10/07/2019 1.20 (H) 0.86 - 1.14 Final       DIALYSIS PROCEDURE NOTE  Hepatitis status of previous patient on machine log was checked and verified ok to use with this patients hepatitis status.  Patient dialyzed for 3 hrs. on a K3 bath with a net fluid removal of  0.8L.  A BFR of 400 ml/min was obtained via a R tunneled CVC.      The treatment plan was discussed with Dr. Fink during the treatment.    Total heparin received during the treatment: 0 units.   Line flushed, clamped and capped with heparin 1:1000 1.9 mL (1900 units) per lumen    Meds  given: none   Complications: hypotension, UF goal reduced by 0.2L      Person educated: Patient.  Knowledge base limited.. Barriers to learning: Trach, unable to speak. Educated on procedure via verbal mode. Patient acknowledged understanding by nodding yes/no to questions.   ICEBOAT? Timeout performed pre-treatment  I: Patient was identified using 2 identifiers  C:  Consent Signed Yes  E: Equipment preventative maintenance is current and dialysis delivery system OK to use  B:   Latest Reference Range & Units 12/10/22 20:22   Hep B Surface Agn Nonreactive  Nonreactive   Hepatitis B Surface Antibody Instrument Value <8.00 m[IU]/mL 0.00   Hepatitis B Surface Antibody  Nonreactive   O:  Dialysis orders present and complete prior to treatment  A: Vascular access verified and assessed prior to treatment  T: Treatment was performed at a clinically appropriate time  ?: Patient was allowed to ask questions and address concerns prior to treatment  See Adult Hemodialysis flowsheet in EPIC for further details and post assessment.  Machine water alarm in place and functioning. Transducer pods intact and checked every 15min.   Pt assisted with repositioning throughout dialysis treatment.  Chlorine/Chloramine water system checked every 4 hours.        Post treatment report given to PETER Dorman regarding 0.8L of fluid removed, last BP 94/62      Karina Nixon RN

## 2022-12-14 NOTE — PROGRESS NOTES
Care Management Follow Up    Length of Stay (days): 32    Expected Discharge Date: 12/16/2022     Concerns to be Addressed: discharge planning     Patient plan of care discussed at interdisciplinary rounds: Yes    Anticipated Discharge Disposition:  (LTACH)     Anticipated Discharge Services: Transportation Services (stretcher)  Anticipated Discharge DME: Oxygen (ventilator)    Patient/family educated on Medicare website which has current facility and service quality ratings: yes  Education Provided on the Discharge Plan:    Patient/Family in Agreement with the Plan: yes    Referrals Placed by CM/SW: Post Acute Facilities  Private pay costs discussed: Not applicable    Additional Information:  Patient anticipated to be ready to transfer to Health system on Thurs 12/15.  [facility] has received insurance auth and would be able to accept him.  [facility] ALS stretcher transport set up for 1300 on 12/15.  Contacted ICU and requested HUC update RN and physician.  Talked to patient's wife Ofe and updated her with plan.    Caitlin Lockhart RN, BSN, PHN  Inpatient Care Coordination  [facility]  Phone: [phone]

## 2022-12-14 NOTE — PLAN OF CARE
Occupational Therapy Discharge Summary    Reason for therapy discharge:    Discharged to long term care facility.    Progress towards therapy goal(s). See goals on Care Plan in [facility] electronic health record for goal details.  Goals not met.  Barriers to achieving goals:   limited tolerance for therapy and discharge from facility.    Therapy recommendation(s):    Continued therapy is recommended.  Rationale/Recommendations:  pt discharging to LTACH tomorrow and will continue with therapies to advance strength, ADLs .

## 2022-12-14 NOTE — PLAN OF CARE
Neuro: Alert and oriented. Able to make needs known. Moves all extremities equally but overall very deconditioned.   CV: Sinus rhythm/sinus tachycardia. No issues with blood pressure this shift.   Respiratory: On trach dome until 2300, then switched back to full vent support overnight per patient request.   GI/: No urine output. 2 large loose bowel movements. On lactulose.   Skin: Scattered bruising.   Activity: Bedrest, up to chair during the day.   Diet: NPO with tube feeding at goal rate.  Drips: None.  Other: Calm and cooperative, no changes overnight.   Plan: To LTACH eventually.

## 2022-12-14 NOTE — PROGRESS NOTES
Pt on HFTD 40% 35L since 1300, Volera and nebs given as ordered. Trach cares done. RT will continue to follow.     Augusta Donald,  4:52 PM 12/14/22

## 2022-12-14 NOTE — PLAN OF CARE
Shift Summary  Assumed cares from [id number].    Neuro: Alert, follows commands appropriately.   Cardiac: SR to ST in low 100's. Titrated levo off. Pt has been meeting MAP goal post 1uPRBCs.   Pulmonary: R CT w/scant output. LS coarse. Pt has been trach doming since approximately 1145.   GI: TF at goal.   : Anuric.   Integumentary: See flow-sheet.   Restraints: Not needed  Activity: Pt up to chair with lift today.     Plan of care has been explained to patient: Yes    Jerrica Figueroa RN

## 2022-12-14 NOTE — PROGRESS NOTES
Renal Medicine Progress Note            Assessment/Plan:     Blanco Osborne is a 57-year-old male with PMH CARRILLO s/p liver transplant (9/2016) and cirrhosis admitted 11/12/2022 with out of hospital PEA arrest and acute hypoxemic respiratory failure. Course complicated by parainfluenza virus, streptococcal bacteremia, and LORETTA requiring CRRT.         1.  Severe anuric LORETTA:               -CRRT stopped 11/22 and resume on 11/24 and stopped 11/26.               -IHD started 11/29/30.                2.  s/p Septic shock with MODS:                -intermittant levophed for hypotension               - Midodrine increased to 10 mg TID . BP better    3.  Respiratory failure:  now trach'ed              -R Pneumothorax  - s/p CHest tube               -Aspergillus PNA              4.  ESLD due to CARRILLO s/p liver transplant                - tacrolimus 1mg BID, dosing per GI     5.  Hypoalbuminemia      6. Anemia- s/p PRBC on 12/13. Hgb 7 today         Plan/Recs:  1) Continue MWF dialysis . HD today . 1 L UF     2) dispo to LTACH, continue HD there and monitor for recovery. No signs of renal recovery       Maria Fink MD  [facility] - Nephrology   [phone]          Interval History:     Seen/examine on dialysis . HD running okay . Target UF of 1 L   No acute issues overnight.  BP more stable on higher dose of midodrine.   Trach odalyse tried intermittently   Remains off pressors.  Afebrile.  Trach +  On tube feed          Medications and Allergies:       - MEDICATION INSTRUCTIONS for Dialysis Patients -   Does not apply See Admin Instructions     sodium chloride 0.9%  250 mL Intravenous Once in dialysis/CRRT     sodium chloride 0.9%  300 mL Hemodialysis Machine Once     acetylcysteine  2 mL Nebulization BID     apixaban ANTICOAGULANT  5 mg Oral or Feeding Tube BID     artificial tears  1 drop Both Eyes TID     epoetin mirta-epbx  10,000 Units Intravenous Once in dialysis/CRRT     folic acid  1 mg Intravenous Daily      sodium chloride (PF) 0.9%  10 mL Intracatheter Once in dialysis/CRRT    Followed by     heparin  1.3-2.6 mL Intracatheter Once in dialysis/CRRT     sodium chloride (PF) 0.9%  10 mL Intracatheter Once in dialysis/CRRT    Followed by     heparin  1.3-2.6 mL Intracatheter Once in dialysis/CRRT     heparin lock flush  5-20 mL Intracatheter Q24H     hydrocortisone sodium succinate PF  25 mg Intravenous Q6H     insulin aspart  1-12 Units Subcutaneous Q4H     insulin glargine  30 Units Subcutaneous QAM AC     ipratropium - albuterol 0.5 mg/2.5 mg/3 mL  3 mL Nebulization 4x daily     lactulose  20 g Oral or Feeding Tube TID     midodrine  10 mg Oral or Feeding Tube TID w/meals     multivitamins w/minerals  15 mL Per Feeding Tube Daily     - MEDICATION INSTRUCTIONS -   Does not apply Once     pantoprazole  40 mg Per Feeding Tube QAM AC    Or     pantoprazole  40 mg Intravenous QAM AC     rifaximin  550 mg Per Feeding Tube BID     sodium chloride (PF)  10-40 mL Intracatheter Q8H     tacrolimus  1 mg Oral or Feeding Tube BID      No Known Allergies         Physical Exam:   Vitals were reviewed  /76   Pulse 87   Temp 98.9  F (37.2  C) (Oral)   Resp 22   Ht 1.829 m (6')   Wt 89.2 kg (196 lb 10.4 oz)   SpO2 100%   BMI 26.67 kg/m      Wt Readings from Last 3 Encounters:   12/14/22 89.2 kg (196 lb 10.4 oz)   10/07/19 137.5 kg (303 lb 3.2 oz)   10/04/19 131.5 kg (290 lb)     Seen on HD .       GENERAL APPEARANCE: awake, alert.   HEENT:  Trach present  RESP:  Few coarse sounds  CV: tachy, regular  ABDOMEN mild  distension, non-tender  EXTREMITIES/SKIN: no c/c/rashes/lesions; no noted dependent edema  LINES:  Right tunneled dialysis catheter present               Data:     BMP  Recent Labs   Lab 12/14/22  0412 12/14/22  0402 12/14/22  0024 12/13/22 2016 12/13/22  0748 12/13/22  0427 12/12/22  0757 12/12/22  0431 12/10/22  2024 12/10/22  2022     --   --   --   --  136  --  138  --  138   POTASSIUM 4.1  --   --   --    --  3.8  --  4.1  --  3.5   CHLORIDE 99  --   --   --   --  101  --  101  --  103   KELLY 7.9*  --   --   --   --  7.4*  --  8.0*  --  7.3*   CO2 28  --   --   --   --  29  --  27  --  28   BUN 96*  --   --   --   --  72*  --  104*  --  64*   CR 3.36*  --   --   --   --  2.55*  --  3.48*  --  2.24*   * 184* 158* 197*   < > 222*  210*   < > 188*  176*   < > 166*    < > = values in this interval not displayed.     CBC  Recent Labs   Lab 12/14/22  0412 12/13/22  0427 12/12/22  0526 12/12/22  0431 12/10/22  1017   WBC 2.8* 4.6  --  3.2* 6.4   HGB 7.0* 6.5* 7.2* 6.9* 8.7*   HCT 23.1* 22.0*  --  23.1* 27.8*   * 113*  --  113* 109*   PLT 58* 63*  --  49* 70*     Lab Results   Component Value Date    AST 21 12/12/2022    ALT 15 12/12/2022    ALKPHOS 241 (H) 12/12/2022    BILITOTAL 0.8 12/12/2022    CHANDLER 57 (H) 12/12/2022     Lab Results   Component Value Date    INR 1.16 (H) 12/06/2022       Attestation:  I have reviewed today's vital signs, notes, medications, labs and imaging.    Maria Fink MD  [facility] - Nephrology

## 2022-12-14 NOTE — PLAN OF CARE
End of Shift Nursing Summary    Vitals:  stable. Afebrile. C/O abd pain, PRN oxycodone given with relief.   Neuro:  unchanged  CV:  SR/ST  Pulm: trached, tolerating trach dome. Large amount thick secretions from trach this AM  GI/: anuric, no c/o needing to urinate this shift. Small BM. HD run this AM, 800ml off.   Skin: no new issues  Family: wife at bedside, updated on POC. Questions answered  POC: Therapy to see this afternoon. Ride set up for 1300 tomorrow to [facility]     *See EPIC flowsheet for full nursing assessment findings

## 2022-12-15 ENCOUNTER — HOSPITAL ENCOUNTER (INPATIENT)
Facility: CLINIC | Age: 57
LOS: 1 days | Discharge: ANOTHER HOSPITAL | DRG: 208 | End: 2022-12-16
Attending: HOSPITALIST | Admitting: HOSPITALIST
Payer: COMMERCIAL

## 2022-12-15 VITALS
WEIGHT: 191.8 LBS | BODY MASS INDEX: 25.98 KG/M2 | DIASTOLIC BLOOD PRESSURE: 82 MMHG | RESPIRATION RATE: 31 BRPM | HEIGHT: 72 IN | SYSTOLIC BLOOD PRESSURE: 117 MMHG | OXYGEN SATURATION: 98 % | TEMPERATURE: 98.8 F | HEART RATE: 105 BPM

## 2022-12-15 PROBLEM — J96.20: Status: ACTIVE | Noted: 2022-12-15

## 2022-12-15 LAB
ALBUMIN SERPL-MCNC: 1.8 G/DL (ref 3.4–5)
ANION GAP SERPL CALCULATED.3IONS-SCNC: 6 MMOL/L (ref 3–14)
BUN SERPL-MCNC: 72 MG/DL (ref 7–30)
CALCIUM SERPL-MCNC: 8 MG/DL (ref 8.5–10.1)
CHLORIDE BLD-SCNC: 99 MMOL/L (ref 94–109)
CO2 SERPL-SCNC: 30 MMOL/L (ref 20–32)
CREAT SERPL-MCNC: 2.39 MG/DL (ref 0.66–1.25)
ERYTHROCYTE [DISTWIDTH] IN BLOOD BY AUTOMATED COUNT: 18.3 % (ref 10–15)
GFR SERPL CREATININE-BSD FRML MDRD: 31 ML/MIN/1.73M2
GLUCOSE BLD-MCNC: 136 MG/DL (ref 70–99)
GLUCOSE BLDC GLUCOMTR-MCNC: 104 MG/DL (ref 70–99)
GLUCOSE BLDC GLUCOMTR-MCNC: 111 MG/DL (ref 70–99)
GLUCOSE BLDC GLUCOMTR-MCNC: 138 MG/DL (ref 70–99)
GLUCOSE BLDC GLUCOMTR-MCNC: 146 MG/DL (ref 70–99)
GLUCOSE BLDC GLUCOMTR-MCNC: 180 MG/DL (ref 70–99)
GLUCOSE BLDC GLUCOMTR-MCNC: 190 MG/DL (ref 70–99)
GLUCOSE BLDC GLUCOMTR-MCNC: 78 MG/DL (ref 70–99)
HCT VFR BLD AUTO: 24.7 % (ref 40–53)
HGB BLD-MCNC: 7.3 G/DL (ref 13.3–17.7)
MCH RBC QN AUTO: 32.3 PG (ref 26.5–33)
MCHC RBC AUTO-ENTMCNC: 29.6 G/DL (ref 31.5–36.5)
MCV RBC AUTO: 109 FL (ref 78–100)
PHOSPHATE SERPL-MCNC: 4 MG/DL (ref 2.5–4.5)
PLATELET # BLD AUTO: 78 10E3/UL (ref 150–450)
POTASSIUM BLD-SCNC: 4 MMOL/L (ref 3.4–5.3)
RBC # BLD AUTO: 2.26 10E6/UL (ref 4.4–5.9)
SODIUM SERPL-SCNC: 135 MMOL/L (ref 133–144)
WBC # BLD AUTO: 3.7 10E3/UL (ref 4–11)

## 2022-12-15 PROCEDURE — 250N000009 HC RX 250: Performed by: INTERNAL MEDICINE

## 2022-12-15 PROCEDURE — 250N000013 HC RX MED GY IP 250 OP 250 PS 637: Performed by: SURGERY

## 2022-12-15 PROCEDURE — 94640 AIRWAY INHALATION TREATMENT: CPT | Mod: 76

## 2022-12-15 PROCEDURE — 250N000013 HC RX MED GY IP 250 OP 250 PS 637: Performed by: HOSPITALIST

## 2022-12-15 PROCEDURE — 250N000009 HC RX 250: Performed by: PHYSICIAN ASSISTANT

## 2022-12-15 PROCEDURE — 250N000009 HC RX 250: Performed by: HOSPITALIST

## 2022-12-15 PROCEDURE — 94002 VENT MGMT INPAT INIT DAY: CPT

## 2022-12-15 PROCEDURE — 99239 HOSP IP/OBS DSCHRG MGMT >30: CPT | Performed by: INTERNAL MEDICINE

## 2022-12-15 PROCEDURE — 999N000157 HC STATISTIC RCP TIME EA 10 MIN

## 2022-12-15 PROCEDURE — 250N000009 HC RX 250: Performed by: NURSE PRACTITIONER

## 2022-12-15 PROCEDURE — 999N000009 HC STATISTIC AIRWAY CARE

## 2022-12-15 PROCEDURE — 250N000011 HC RX IP 250 OP 636: Performed by: HOSPITALIST

## 2022-12-15 PROCEDURE — 250N000011 HC RX IP 250 OP 636: Performed by: INTERNAL MEDICINE

## 2022-12-15 PROCEDURE — 272N000063 HC CIRCUIT HUMID FACE/TRACH MSK

## 2022-12-15 PROCEDURE — 85027 COMPLETE CBC AUTOMATED: CPT | Performed by: INTERNAL MEDICINE

## 2022-12-15 PROCEDURE — 250N000013 HC RX MED GY IP 250 OP 250 PS 637: Performed by: STUDENT IN AN ORGANIZED HEALTH CARE EDUCATION/TRAINING PROGRAM

## 2022-12-15 PROCEDURE — 5A1935Z RESPIRATORY VENTILATION, LESS THAN 24 CONSECUTIVE HOURS: ICD-10-PCS | Performed by: NURSE PRACTITIONER

## 2022-12-15 PROCEDURE — 87081 CULTURE SCREEN ONLY: CPT | Performed by: HOSPITALIST

## 2022-12-15 PROCEDURE — 99223 1ST HOSP IP/OBS HIGH 75: CPT | Performed by: HOSPITALIST

## 2022-12-15 PROCEDURE — 250N000013 HC RX MED GY IP 250 OP 250 PS 637: Performed by: PHYSICIAN ASSISTANT

## 2022-12-15 PROCEDURE — 250N000013 HC RX MED GY IP 250 OP 250 PS 637: Performed by: INTERNAL MEDICINE

## 2022-12-15 PROCEDURE — 99232 SBSQ HOSP IP/OBS MODERATE 35: CPT | Performed by: INTERNAL MEDICINE

## 2022-12-15 PROCEDURE — 120N000017 HC R&B RESPIRATORY CARE

## 2022-12-15 PROCEDURE — 94640 AIRWAY INHALATION TREATMENT: CPT

## 2022-12-15 PROCEDURE — 94003 VENT MGMT INPAT SUBQ DAY: CPT

## 2022-12-15 PROCEDURE — 250N000012 HC RX MED GY IP 250 OP 636 PS 637: Performed by: NURSE PRACTITIONER

## 2022-12-15 PROCEDURE — 272N000735 HC KIT, CIRCUIT WITH NEB, VOLERA

## 2022-12-15 PROCEDURE — 80069 RENAL FUNCTION PANEL: CPT | Performed by: INTERNAL MEDICINE

## 2022-12-15 PROCEDURE — 94799 UNLISTED PULMONARY SVC/PX: CPT

## 2022-12-15 PROCEDURE — 94002 VENT MGMT INPAT INIT DAY: CPT | Performed by: NURSE PRACTITIONER

## 2022-12-15 PROCEDURE — 250N000012 HC RX MED GY IP 250 OP 636 PS 637: Performed by: HOSPITALIST

## 2022-12-15 RX ORDER — CALCIUM CARBONATE 500 MG/1
500-1000 TABLET, CHEWABLE ORAL 4 TIMES DAILY PRN
Status: DISCONTINUED | OUTPATIENT
Start: 2022-12-15 | End: 2022-12-15

## 2022-12-15 RX ORDER — OXYCODONE HYDROCHLORIDE 5 MG/1
5 TABLET ORAL EVERY 4 HOURS PRN
Qty: 60 TABLET | Refills: 0 | Status: ON HOLD
Start: 2022-12-15 | End: 2023-04-28

## 2022-12-15 RX ORDER — NYSTATIN 100000/ML
500000 SUSPENSION, ORAL (FINAL DOSE FORM) ORAL 4 TIMES DAILY
Status: DISCONTINUED | OUTPATIENT
Start: 2022-12-15 | End: 2022-12-15 | Stop reason: HOSPADM

## 2022-12-15 RX ORDER — ACETAMINOPHEN 325 MG/1
650 TABLET ORAL EVERY 4 HOURS PRN
Status: DISCONTINUED | OUTPATIENT
Start: 2022-12-15 | End: 2022-12-16 | Stop reason: HOSPADM

## 2022-12-15 RX ORDER — ACETYLCYSTEINE 200 MG/ML
2 SOLUTION ORAL; RESPIRATORY (INHALATION)
Status: DISCONTINUED | OUTPATIENT
Start: 2022-12-15 | End: 2022-12-16 | Stop reason: HOSPADM

## 2022-12-15 RX ORDER — ALBUTEROL SULFATE 0.83 MG/ML
2.5 SOLUTION RESPIRATORY (INHALATION)
Qty: 90 ML | Refills: 1 | Status: ON HOLD
Start: 2022-12-15 | End: 2023-04-28

## 2022-12-15 RX ORDER — LACTULOSE 10 G/15ML
20 SOLUTION ORAL 3 TIMES DAILY
Status: DISCONTINUED | OUTPATIENT
Start: 2022-12-15 | End: 2022-12-16 | Stop reason: HOSPADM

## 2022-12-15 RX ORDER — FAMOTIDINE 20 MG/1
20 TABLET, FILM COATED ORAL 2 TIMES DAILY PRN
Status: DISCONTINUED | OUTPATIENT
Start: 2022-12-15 | End: 2022-12-15

## 2022-12-15 RX ORDER — MIDODRINE HYDROCHLORIDE 5 MG/1
10 TABLET ORAL
Status: DISCONTINUED | OUTPATIENT
Start: 2022-12-15 | End: 2022-12-16 | Stop reason: HOSPADM

## 2022-12-15 RX ORDER — NALOXONE HYDROCHLORIDE 0.4 MG/ML
0.4 INJECTION, SOLUTION INTRAMUSCULAR; INTRAVENOUS; SUBCUTANEOUS
Status: DISCONTINUED | OUTPATIENT
Start: 2022-12-15 | End: 2022-12-16 | Stop reason: HOSPADM

## 2022-12-15 RX ORDER — ACETYLCYSTEINE 200 MG/ML
2 SOLUTION ORAL; RESPIRATORY (INHALATION) 2 TIMES DAILY
Status: DISCONTINUED | OUTPATIENT
Start: 2022-12-15 | End: 2022-12-15

## 2022-12-15 RX ORDER — GUAIFENESIN 600 MG/1
15 TABLET, EXTENDED RELEASE ORAL DAILY
Qty: 100 ML | Refills: 1 | Status: ON HOLD
Start: 2022-12-16 | End: 2023-04-28

## 2022-12-15 RX ORDER — CARBOXYMETHYLCELLULOSE SODIUM 5 MG/ML
1 SOLUTION/ DROPS OPHTHALMIC 3 TIMES DAILY
Status: DISCONTINUED | OUTPATIENT
Start: 2022-12-15 | End: 2022-12-16 | Stop reason: HOSPADM

## 2022-12-15 RX ORDER — LACTULOSE 10 G/15ML
20 SOLUTION ORAL 3 TIMES DAILY
Qty: 100 ML | Refills: 1 | Status: ON HOLD
Start: 2022-12-15 | End: 2023-04-28

## 2022-12-15 RX ORDER — NALOXONE HYDROCHLORIDE 0.4 MG/ML
0.2 INJECTION, SOLUTION INTRAMUSCULAR; INTRAVENOUS; SUBCUTANEOUS
Status: DISCONTINUED | OUTPATIENT
Start: 2022-12-15 | End: 2022-12-16 | Stop reason: HOSPADM

## 2022-12-15 RX ORDER — IPRATROPIUM BROMIDE AND ALBUTEROL SULFATE 2.5; .5 MG/3ML; MG/3ML
3 SOLUTION RESPIRATORY (INHALATION) 4 TIMES DAILY
Qty: 90 ML | Refills: 1 | Status: ON HOLD
Start: 2022-12-15 | End: 2023-04-28

## 2022-12-15 RX ORDER — IPRATROPIUM BROMIDE AND ALBUTEROL SULFATE 2.5; .5 MG/3ML; MG/3ML
3 SOLUTION RESPIRATORY (INHALATION)
Status: DISCONTINUED | OUTPATIENT
Start: 2022-12-15 | End: 2022-12-16 | Stop reason: HOSPADM

## 2022-12-15 RX ORDER — DOCUSATE SODIUM 100 MG/1
100 CAPSULE, LIQUID FILLED ORAL 2 TIMES DAILY PRN
Status: DISCONTINUED | OUTPATIENT
Start: 2022-12-15 | End: 2022-12-16 | Stop reason: HOSPADM

## 2022-12-15 RX ORDER — ACETYLCYSTEINE 200 MG/ML
2 SOLUTION ORAL; RESPIRATORY (INHALATION) 2 TIMES DAILY
Qty: 100 ML | Refills: 1 | Status: ON HOLD
Start: 2022-12-15 | End: 2023-04-28

## 2022-12-15 RX ORDER — NICOTINE POLACRILEX 4 MG
15-30 LOZENGE BUCCAL
Status: DISCONTINUED | OUTPATIENT
Start: 2022-12-15 | End: 2022-12-15

## 2022-12-15 RX ORDER — MIDODRINE HYDROCHLORIDE 10 MG/1
10 TABLET ORAL
Qty: 100 TABLET | Refills: 1 | Status: ON HOLD
Start: 2022-12-15 | End: 2023-04-28

## 2022-12-15 RX ORDER — FOLIC ACID 5 MG/ML
1 INJECTION, SOLUTION INTRAMUSCULAR; INTRAVENOUS; SUBCUTANEOUS DAILY
Status: DISCONTINUED | OUTPATIENT
Start: 2022-12-16 | End: 2022-12-16 | Stop reason: HOSPADM

## 2022-12-15 RX ORDER — NYSTATIN 100000/ML
500000 SUSPENSION, ORAL (FINAL DOSE FORM) ORAL 4 TIMES DAILY
Qty: 100 ML | Refills: 1 | Status: ON HOLD | OUTPATIENT
Start: 2022-12-15 | End: 2023-04-28

## 2022-12-15 RX ORDER — ALBUTEROL SULFATE 0.83 MG/ML
2.5 SOLUTION RESPIRATORY (INHALATION)
Status: DISCONTINUED | OUTPATIENT
Start: 2022-12-15 | End: 2022-12-16 | Stop reason: HOSPADM

## 2022-12-15 RX ORDER — DEXTROSE MONOHYDRATE 25 G/50ML
25-50 INJECTION, SOLUTION INTRAVENOUS
Status: DISCONTINUED | OUTPATIENT
Start: 2022-12-15 | End: 2022-12-16 | Stop reason: HOSPADM

## 2022-12-15 RX ORDER — ONDANSETRON 4 MG/1
4 TABLET, ORALLY DISINTEGRATING ORAL EVERY 6 HOURS PRN
Status: DISCONTINUED | OUTPATIENT
Start: 2022-12-15 | End: 2022-12-16 | Stop reason: HOSPADM

## 2022-12-15 RX ORDER — BISACODYL 10 MG
10 SUPPOSITORY, RECTAL RECTAL DAILY PRN
Status: DISCONTINUED | OUTPATIENT
Start: 2022-12-15 | End: 2022-12-16 | Stop reason: HOSPADM

## 2022-12-15 RX ORDER — OXYCODONE HYDROCHLORIDE 5 MG/1
5 TABLET ORAL EVERY 4 HOURS PRN
Status: DISCONTINUED | OUTPATIENT
Start: 2022-12-15 | End: 2022-12-16 | Stop reason: HOSPADM

## 2022-12-15 RX ORDER — ONDANSETRON 4 MG/1
4 TABLET, ORALLY DISINTEGRATING ORAL EVERY 6 HOURS PRN
Qty: 30 TABLET | Refills: 1 | Status: ON HOLD
Start: 2022-12-15 | End: 2023-04-28

## 2022-12-15 RX ORDER — FOLIC ACID 5 MG/ML
1 INJECTION, SOLUTION INTRAMUSCULAR; INTRAVENOUS; SUBCUTANEOUS DAILY
Qty: 50 ML | Refills: 1 | Status: ON HOLD
Start: 2022-12-16 | End: 2023-04-28

## 2022-12-15 RX ORDER — CARBOXYMETHYLCELLULOSE SODIUM 5 MG/ML
1 SOLUTION/ DROPS OPHTHALMIC 3 TIMES DAILY
Qty: 50 EACH | Refills: 1 | Status: ON HOLD
Start: 2022-12-15 | End: 2023-04-28

## 2022-12-15 RX ORDER — NICOTINE POLACRILEX 4 MG
15-30 LOZENGE BUCCAL
Qty: 100 ML | Refills: 1 | Status: ON HOLD | OUTPATIENT
Start: 2022-12-15 | End: 2023-04-28

## 2022-12-15 RX ORDER — NYSTATIN 100000/ML
500000 SUSPENSION, ORAL (FINAL DOSE FORM) ORAL 4 TIMES DAILY
Status: DISCONTINUED | OUTPATIENT
Start: 2022-12-15 | End: 2022-12-16 | Stop reason: HOSPADM

## 2022-12-15 RX ORDER — ACETAMINOPHEN 650 MG/20.3ML
650 LIQUID ORAL EVERY 4 HOURS PRN
Status: DISCONTINUED | OUTPATIENT
Start: 2022-12-15 | End: 2022-12-16 | Stop reason: HOSPADM

## 2022-12-15 RX ORDER — NICOTINE POLACRILEX 4 MG
15-30 LOZENGE BUCCAL
Status: DISCONTINUED | OUTPATIENT
Start: 2022-12-15 | End: 2022-12-16 | Stop reason: HOSPADM

## 2022-12-15 RX ORDER — MINERAL OIL/HYDROPHIL PETROLAT
OINTMENT (GRAM) TOPICAL 2 TIMES DAILY PRN
Status: DISCONTINUED | OUTPATIENT
Start: 2022-12-15 | End: 2022-12-16 | Stop reason: HOSPADM

## 2022-12-15 RX ORDER — GUAIFENESIN 600 MG/1
15 TABLET, EXTENDED RELEASE ORAL DAILY
Status: DISCONTINUED | OUTPATIENT
Start: 2022-12-16 | End: 2022-12-16 | Stop reason: HOSPADM

## 2022-12-15 RX ADMIN — MIDODRINE HYDROCHLORIDE 10 MG: 5 TABLET ORAL at 18:11

## 2022-12-15 RX ADMIN — MIDODRINE HYDROCHLORIDE 10 MG: 5 TABLET ORAL at 11:05

## 2022-12-15 RX ADMIN — LACTULOSE 20 G: 20 SOLUTION ORAL at 08:32

## 2022-12-15 RX ADMIN — IPRATROPIUM BROMIDE AND ALBUTEROL SULFATE 3 ML: .5; 3 SOLUTION RESPIRATORY (INHALATION) at 12:05

## 2022-12-15 RX ADMIN — NYSTATIN 500000 UNITS: 100000 SUSPENSION ORAL at 21:01

## 2022-12-15 RX ADMIN — TACROLIMUS 1 MG: 5 CAPSULE ORAL at 08:17

## 2022-12-15 RX ADMIN — ALBUTEROL SULFATE 2.5 MG: 2.5 SOLUTION RESPIRATORY (INHALATION) at 16:53

## 2022-12-15 RX ADMIN — HYDROGEN PEROXIDE: 30 LIQUID TOPICAL at 04:24

## 2022-12-15 RX ADMIN — APIXABAN 5 MG: 5 TABLET, FILM COATED ORAL at 08:32

## 2022-12-15 RX ADMIN — ACETYLCYSTEINE 2 ML: 200 SOLUTION ORAL; RESPIRATORY (INHALATION) at 20:37

## 2022-12-15 RX ADMIN — RIFAXIMIN 550 MG: 550 TABLET ORAL at 08:32

## 2022-12-15 RX ADMIN — FOLIC ACID 1 MG: 5 INJECTION, SOLUTION INTRAMUSCULAR; INTRAVENOUS; SUBCUTANEOUS at 08:14

## 2022-12-15 RX ADMIN — IPRATROPIUM BROMIDE AND ALBUTEROL SULFATE 3 ML: .5; 3 SOLUTION RESPIRATORY (INHALATION) at 07:57

## 2022-12-15 RX ADMIN — ACETYLCYSTEINE 2 ML: 200 SOLUTION ORAL; RESPIRATORY (INHALATION) at 07:58

## 2022-12-15 RX ADMIN — Medication 40 MG: at 08:16

## 2022-12-15 RX ADMIN — IPRATROPIUM BROMIDE AND ALBUTEROL SULFATE 3 ML: 2.5; .5 SOLUTION RESPIRATORY (INHALATION) at 20:37

## 2022-12-15 RX ADMIN — NYSTATIN 500000 UNITS: 100000 SUSPENSION ORAL at 18:11

## 2022-12-15 RX ADMIN — NYSTATIN 500000 UNITS: 100000 SUSPENSION ORAL at 12:51

## 2022-12-15 RX ADMIN — HYDROCORTISONE SODIUM SUCCINATE 25 MG: 100 INJECTION, POWDER, FOR SOLUTION INTRAMUSCULAR; INTRAVENOUS at 02:25

## 2022-12-15 RX ADMIN — INSULIN ASPART 1 UNITS: 100 INJECTION, SOLUTION INTRAVENOUS; SUBCUTANEOUS at 08:11

## 2022-12-15 RX ADMIN — HYDROCORTISONE SODIUM SUCCINATE 25 MG: 100 INJECTION, POWDER, FOR SOLUTION INTRAMUSCULAR; INTRAVENOUS at 18:12

## 2022-12-15 RX ADMIN — MIDODRINE HYDROCHLORIDE 10 MG: 5 TABLET ORAL at 08:32

## 2022-12-15 RX ADMIN — NYSTATIN 500000 UNITS: 100000 SUSPENSION ORAL at 11:05

## 2022-12-15 RX ADMIN — ACETYLCYSTEINE 2 ML: 200 SOLUTION ORAL; RESPIRATORY (INHALATION) at 16:53

## 2022-12-15 RX ADMIN — LACTULOSE 20 G: 20 SOLUTION ORAL at 19:48

## 2022-12-15 RX ADMIN — TACROLIMUS 1 MG: 5 CAPSULE ORAL at 20:04

## 2022-12-15 RX ADMIN — Medication 1 DROP: at 08:45

## 2022-12-15 RX ADMIN — INSULIN ASPART 2 UNITS: 100 INJECTION, SOLUTION INTRAVENOUS; SUBCUTANEOUS at 00:58

## 2022-12-15 RX ADMIN — HYDROCORTISONE SODIUM SUCCINATE 25 MG: 100 INJECTION, POWDER, FOR SOLUTION INTRAMUSCULAR; INTRAVENOUS at 08:19

## 2022-12-15 RX ADMIN — OXYCODONE HYDROCHLORIDE 5 MG: 5 TABLET ORAL at 21:01

## 2022-12-15 RX ADMIN — Medication 15 ML: at 08:32

## 2022-12-15 RX ADMIN — CARBOXYMETHYLCELLULOSE SODIUM 1 DROP: 0.5 SOLUTION/ DROPS OPHTHALMIC at 19:47

## 2022-12-15 RX ADMIN — RIFAXIMIN 550 MG: 550 TABLET ORAL at 21:01

## 2022-12-15 RX ADMIN — INSULIN GLARGINE 30 UNITS: 100 INJECTION, SOLUTION SUBCUTANEOUS at 08:11

## 2022-12-15 RX ADMIN — INSULIN ASPART 3 UNITS: 100 INJECTION, SOLUTION INTRAVENOUS; SUBCUTANEOUS at 12:32

## 2022-12-15 RX ADMIN — APIXABAN 5 MG: 2.5 TABLET, FILM COATED ORAL at 21:01

## 2022-12-15 ASSESSMENT — ACTIVITIES OF DAILY LIVING (ADL)
CHANGE_IN_FUNCTIONAL_STATUS_SINCE_ONSET_OF_CURRENT_ILLNESS/INJURY: YES
CONCENTRATING,_REMEMBERING_OR_MAKING_DECISIONS_DIFFICULTY: NO
TOILETING_ISSUES: NO
ADLS_ACUITY_SCORE: 38
ADLS_ACUITY_SCORE: 34
DRESSING/BATHING_DIFFICULTY: NO
COMMUNICATION: OTHER (SEE COMMENTS)
ADLS_ACUITY_SCORE: 40
DOING_ERRANDS_INDEPENDENTLY_DIFFICULTY: NO
DIFFICULTY_EATING/SWALLOWING: YES
ADLS_ACUITY_SCORE: 35
WEAR_GLASSES_OR_BLIND: NO
ADLS_ACUITY_SCORE: 36
ADLS_ACUITY_SCORE: 34
ADLS_ACUITY_SCORE: 36
ADLS_ACUITY_SCORE: 36
ADLS_ACUITY_SCORE: 34
ADLS_ACUITY_SCORE: 34
ADLS_ACUITY_SCORE: 40
FALL_HISTORY_WITHIN_LAST_SIX_MONTHS: NO
ADLS_ACUITY_SCORE: 35
EATING/SWALLOWING: OTHER (SEE COMMENTS)
DIFFICULTY_COMMUNICATING: YES
WALKING_OR_CLIMBING_STAIRS_DIFFICULTY: NO

## 2022-12-15 NOTE — PLAN OF CARE
Major Shift Events:  No acute events overnight. On trach dome @ 35 % FiO2 and 30 L; thick secretions w/suctioning, tolerating and LS coarse t/o.  No c/o pain. R chest tube w/no drainage overnight. Anuric 2nd to HD and medium BM overnight.  Plan: Has ride set for LTACH at 1300.  For vital signs and complete assessments, please see documentation flowsheets.

## 2022-12-15 NOTE — PROGRESS NOTES
Renal Medicine Progress Note            Assessment/Plan:     Blanco Osborne is a 57-year-old male with PMH CARRILLO s/p liver transplant (9/2016) and cirrhosis admitted 11/12/2022 with out of hospital PEA arrest and acute hypoxemic respiratory failure. Course complicated by parainfluenza virus, streptococcal bacteremia, and LORETTA requiring CRRT.         1.  Severe anuric LORETTA:               -CRRT stopped 11/22 and resume on 11/24 and stopped 11/26.               -IHD started 11/29/22                2.  s/p Septic shock with MODS:                -Resolved               - Midodrine increased to 10 mg TID . BP better    3.  Respiratory failure:  now trach'ed              -R Pneumothorax  - s/p CHest tube               -Aspergillus PNA              4.  ESLD due to CARRILLO s/p liver transplant                - tacrolimus 1mg BID, dosing per GI     5.  Hypoalbuminemia      6. Anemia- s/p PRBC on 12/13. Hgb 7.3 today         Plan/Recs:  1) Continue MWF dialysis . HD tomorrow, 1-2 liters ultrafiltration as tolerated.  EPO 6000 units    2) dispo to LTACH, continue HD there and monitor for recovery. No signs of renal recovery       Maria Fink MD  [facility] - Nephrology   [phone]          Interval History:       No acute issues overnight.  BP more stable on higher dose of midodrine.   Remains off pressors.  Afebrile.  Trach +  On tube feed  Noted plans for discharge to LTAC today          Medications and Allergies:       - MEDICATION INSTRUCTIONS for Dialysis Patients -   Does not apply See Admin Instructions     acetylcysteine  2 mL Nebulization BID     apixaban ANTICOAGULANT  5 mg Oral or Feeding Tube BID     artificial tears  1 drop Both Eyes TID     epoetin mirta-epbx  10,000 Units Intravenous Once in dialysis/CRRT     folic acid  1 mg Intravenous Daily     heparin lock flush  5-20 mL Intracatheter Q24H     hydrocortisone sodium succinate PF  25 mg Intravenous Q6H     insulin aspart  1-12 Units Subcutaneous Q4H      insulin glargine  30 Units Subcutaneous QAM AC     ipratropium - albuterol 0.5 mg/2.5 mg/3 mL  3 mL Nebulization 4x daily     lactulose  20 g Oral or Feeding Tube TID     midodrine  10 mg Oral or Feeding Tube TID w/meals     multivitamins w/minerals  15 mL Per Feeding Tube Daily     nystatin  500,000 Units Swish & Swallow 4x Daily     pantoprazole  40 mg Per Feeding Tube QAM AC    Or     pantoprazole  40 mg Intravenous QAM AC     rifaximin  550 mg Per Feeding Tube BID     sodium chloride (PF)  10-40 mL Intracatheter Q8H     tacrolimus  1 mg Oral or Feeding Tube BID      No Known Allergies         Physical Exam:   Vitals were reviewed  /82 (Cuff Size: Adult Regular)   Pulse 105   Temp 98.8  F (37.1  C) (Axillary)   Resp (!) 31   Ht 1.829 m (6')   Wt 87 kg (191 lb 12.8 oz)   SpO2 98%   BMI 26.01 kg/m      Wt Readings from Last 3 Encounters:   12/15/22 87 kg (191 lb 12.8 oz)   10/07/19 137.5 kg (303 lb 3.2 oz)   10/04/19 131.5 kg (290 lb)       GENERAL APPEARANCE: awake, alert.   HEENT:  Trach present  RESP:  Few coarse sounds  CV: tachy, regular  ABDOMEN mild  distension, non-tender  EXTREMITIES/SKIN: no c/c/rashes/lesions; no noted dependent edema  LINES:  Right tunneled dialysis catheter present               Data:     BMP  Recent Labs   Lab 12/15/22  1230 12/15/22  0809 12/15/22  0414 12/15/22  0409 12/14/22  0944 12/14/22  0412 12/13/22  0748 12/13/22  0427 12/12/22  0757 12/12/22  0431   NA  --   --  135  --   --  136  --  136  --  138   POTASSIUM  --   --  4.0  --   --  4.1  --  3.8  --  4.1   CHLORIDE  --   --  99  --   --  99  --  101  --  101   KELLY  --   --  8.0*  --   --  7.9*  --  7.4*  --  8.0*   CO2  --   --  30  --   --  28  --  29  --  27   BUN  --   --  72*  --   --  96*  --  72*  --  104*   CR  --   --  2.39*  --   --  3.36*  --  2.55*  --  3.48*   * 146* 136* 138*   < > 196*   < > 222*  210*   < > 188*  176*    < > = values in this interval not displayed.     CBC  Recent  Labs   Lab 12/15/22  0414 12/14/22  0412 12/13/22  0427 12/12/22  0526 12/12/22  0431   WBC 3.7* 2.8* 4.6  --  3.2*   HGB 7.3* 7.0* 6.5* 7.2* 6.9*   HCT 24.7* 23.1* 22.0*  --  23.1*   * 110* 113*  --  113*   PLT 78* 58* 63*  --  49*     Lab Results   Component Value Date    AST 21 12/12/2022    ALT 15 12/12/2022    ALKPHOS 241 (H) 12/12/2022    BILITOTAL 0.8 12/12/2022    CHANDLER 57 (H) 12/12/2022     Lab Results   Component Value Date    INR 1.16 (H) 12/06/2022       Attestation:  I have reviewed today's vital signs, notes, medications, labs and imaging.    Maria Fink MD  [facility] - Nephrology

## 2022-12-15 NOTE — PLAN OF CARE
Pt. Stable on trach dome this 4H shift, good perfusion tolerating tube feeding, one BM, up to chair, pt's brother and sister-in-law in to visit and were supportive, plan to transfer to [place] tomorrow.

## 2022-12-15 NOTE — PLAN OF CARE
Problem: Mechanical Ventilation Invasive  Goal: Effective Communication  Outcome: Progressing  Goal: Optimal Device Function  Outcome: Progressing  Intervention: Optimize Device Care and Function  Recent Flowsheet Documentation  Taken 12/15/2022 1653 by oRcio Giang, RT  Airway Safety Measures: all equipment/monitors on and audible  Taken 12/15/2022 1445 by Rocio Giang, RT  Airway Safety Measures: all equipment/monitors on and audible  Goal: Mechanical Ventilation Liberation  Outcome: Progressing  Goal: Absence of Device-Related Skin and Tissue Injury  Outcome: Progressing  Goal: Absence of Ventilator-Induced Lung Injury  Outcome: Progressing   Goal Outcome Evaluation:

## 2022-12-15 NOTE — PROGRESS NOTES
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 Acute respiratory failure - he remains on 30% and +5 PEEP, trach in place, receiving pulmonary hygiene measures, and now tolerateing about 14 + hours of trach dome per day; will lengthen as tolerated.  He had a chest tube in place for right sided pneumothorax  2. Pneumonia - strep and parainfluenza; completed antibiotics;  monitor closely.   3. ID- voriconazole off; completed 15 days of Rocephin  4. Shock - off vasopressors  5. Acute renal failure - HD  6. S/p initial arrest and prolonged resuscitation (11.9) and hypothermia resuscitation   7. History of Liver Transplant 2016 - tacrolimus at 1 mg bid and on solucortef. Will have Tx service reassess rejection meds. Will check tacrolimus level.  8. History of HTN  9. CHRISTIAN on BIPAP when not on vent   10. Nutrition - enteral resumed via PEG   11. CNS- awake and following commands, long term CNS prognosis is likely favorable.  12. Atrial fibrillation, likely paroxysmal and with concern of prior CVA's will continue with apixaban at 5 BID   13. Platelets 63k and WBC 4.6; follow only         Summary/Hospital Course:           Assessment and plan :     Blanco Osborne IS a 57 year old male admitted on  11/12/2022 for acute respiratory failure.   I have personally reviewed the daily labs, imaging studies, cultures and discussed the case with referring physician and consulting physicians.     My assessment and plan by system for this patient is as follows:    Neurology/Psychiatry:   1. Alert on vent and moving all extremities to command    Cardiovascular:   1.Hemodynamics - compensated off support    Pulmonary/Ventilator Management:   1. On 30%, +5 PEEP when on vent and on trach dome most of the day    GI and Nutrition :   1. Enteral nutrition     Renal/Fluids/Electrolytes:   1. He remains on HD    Infectious Disease:   1. Off antibiotics     Endocrine:   1. Glucoses ok; no change in regimen    Hematology/Oncology:   1. Hb runs about 7 and will transfuse as needed.      IV/Access:   1. Venous access -   2. Arterial access -   3.  Plan  - central access required and necessary      ICU Prophylaxis:   1. DVT: Hep Subq/ LMWH/mechanical  2. VAP: HOB 30 degrees, chlorhexidine rinse  3. Stress Ulcer: PPI/H2 blocker  4. Restraints: Nonviolent soft two point restraints required and necessary for patient safety and continued cares and good effect as patient continues to pull at necessary lines, tubes despite education and distraction. Will readdress daily.   5. IV Access - central access required and necessary for continued patient cares  6. Feeding - enteral nutrition   7. Family Update: deferred   8. Disposition - ICU care         Key goals for next 24 hours:   1. Continue weaning from vent   2.  3.               Interim History:              Key Medications:       - MEDICATION INSTRUCTIONS for Dialysis Patients -   Does not apply See Admin Instructions     acetylcysteine  2 mL Nebulization BID     apixaban ANTICOAGULANT  5 mg Oral or Feeding Tube BID     artificial tears  1 drop Both Eyes TID     epoetin mirta-epbx  10,000 Units Intravenous Once in dialysis/CRRT     folic acid  1 mg Intravenous Daily     heparin lock flush   5-20 mL Intracatheter Q24H     hydrocortisone sodium succinate PF  25 mg Intravenous Q6H     insulin aspart  1-12 Units Subcutaneous Q4H     insulin glargine  30 Units Subcutaneous QAM AC     ipratropium - albuterol 0.5 mg/2.5 mg/3 mL  3 mL Nebulization 4x daily     lactulose  20 g Oral or Feeding Tube TID     midodrine  10 mg Oral or Feeding Tube TID w/meals     multivitamins w/minerals  15 mL Per Feeding Tube Daily     pantoprazole  40 mg Per Feeding Tube QAM AC    Or     pantoprazole  40 mg Intravenous QAM AC     rifaximin  550 mg Per Feeding Tube BID     sodium chloride (PF)  10-40 mL Intracatheter Q8H     tacrolimus  1 mg Oral or Feeding Tube BID       dextrose       - MEDICATION INSTRUCTIONS -       - MEDICATION INSTRUCTIONS -       norepinephrine Stopped (12/13/22 1129)     sodium chloride 10 mL/hr at 12/09/22 2000               Physical Examination:   Temp:  [97.7  F (36.5  C)-98.9  F (37.2  C)] 98.8  F (37.1  C)  Pulse:  [] 105  Resp:  [10-40] 19  BP: ()/(51-93) 122/79  FiO2 (%):  [35 %-40 %] 40 %  SpO2:  [92 %-100 %] 99 %    Intake/Output Summary (Last 24 hours) at 12/14/2022 1904  Last data filed at 12/14/2022 1600  Gross per 24 hour   Intake 2030 ml   Output 805 ml   Net 1225 ml     Wt Readings from Last 4 Encounters:   12/14/22 89.2 kg (196 lb 10.4 oz)   10/07/19 137.5 kg (303 lb 3.2 oz)   10/04/19 131.5 kg (290 lb)   02/20/19 140.4 kg (309 lb 9.6 oz)     BP - Mean:  [] 92  Vent Mode: CMV/AC  (Continuous Mandatory Ventilation/ Assist Control)  FiO2 (%): 40 %  Resp Rate (Set): 16 breaths/min  Tidal Volume (Set, mL): 450 mL  PEEP (cm H2O): 5 cmH2O  Resp: 19    Recent Labs   Lab 12/10/22  2022   O2PER 30       GEN: no acute distress   HEENT: head ncat, sclera anicteric, OP patent, trachea midline   PULM: unlabored synchronous with vent, clear anteriorly    CV/COR: RRR S1S2 no gallop,  No rub, no murmur  ABD: soft nontender, hypoactive bowel sounds, no mass  EXT:  Edema   warm  NEURO:  grossly intact  SKIN: no obvious rash  LINES: clean, dry intact         Data:   All data and imaging reviewed     ROUTINE ICU LABS (Last four results)  CMP  Recent Labs   Lab 12/14/22  1536 12/14/22  1318 12/14/22  0944 12/14/22  0412 12/13/22  0748 12/13/22  0427 12/12/22  0757 12/12/22  0431 12/10/22  2024 12/10/22  2022 12/09/22  0745 12/09/22  0423 12/08/22  0814 12/08/22  0446   NA  --   --   --  136  --  136  --  138  --  138   < > 136  --  137   POTASSIUM  --   --   --  4.1  --  3.8  --  4.1  --  3.5   < > 3.6  --  3.9   CHLORIDE  --   --   --  99  --  101  --  101  --  103   < > 99  --  100   CO2  --   --   --  28  --  29  --  27  --  28   < > 28  --  29   ANIONGAP  --   --   --  9  --  6  --  10  --  7   < > 9  --  8   * 156* 193* 196*   < > 222*  210*   < > 188*  176*   < > 166*   < > 203*   < > 183*   BUN  --   --   --  96*  --  72*  --  104*  --  64*   < > 70*  --  84*   CR  --   --   --  3.36*  --  2.55*  --  3.48*  --  2.24*   < > 2.64*  --  3.51*   GFRESTIMATED  --   --   --  20*  --  28*  --  20*  --  33*   < > 27*  --  19*   KELLY  --   --   --  7.9*  --  7.4*  --  8.0*  --  7.3*   < > 8.4*  --  7.8*   MAG  --   --   --   --   --   --   --  2.1  --   --   --   --   --   --    PHOS  --   --   --   --   --  4.1  --  5.0*  --   --   --   --   --  3.3   PROTTOTAL  --   --   --   --   --   --   --  5.0*  --   --   --  5.7*  --   --    ALBUMIN  --   --   --   --   --  1.7*  --  1.6*  --   --   --  2.0*  --  1.9*   BILITOTAL  --   --   --   --   --   --   --  0.8  --   --   --  0.9  --   --    ALKPHOS  --   --   --   --   --   --   --  241*  --   --   --  301*  --   --    AST  --   --   --   --   --   --   --  21  --   --   --  20  --   --    ALT  --   --   --   --   --   --   --  15  --   --   --  20  --   --     < > = values in this interval not displayed.     CBC  Recent Labs   Lab 12/14/22  0412 12/13/22  0427 12/12/22  0526 12/12/22  0431 12/10/22  1017   WBC 2.8* 4.6  --  3.2* 6.4   RBC 2.11*  1.95*  --  2.05* 2.55*   HGB 7.0* 6.5* 7.2* 6.9* 8.7*   HCT 23.1* 22.0*  --  23.1* 27.8*   * 113*  --  113* 109*   MCH 33.2* 33.3*  --  33.7* 34.1*   MCHC 30.3* 29.5*  --  29.9* 31.3*   RDW 18.6* 17.0*  --  17.1* 18.3*   PLT 58* 63*  --  49* 70*     INRNo lab results found in last 7 days.  Arterial Blood Gas  Recent Labs   Lab 12/10/22  2022   O2PER 30       All cultures:  No results for input(s): CULT in the last 168 hours.  No results found for this or any previous visit (from the past 24 hour(s)).    MD Jessica    Billing: This patient is critically ill: Yes. Total critical care time today 35 min.

## 2022-12-15 NOTE — PHARMACY-ADMISSION MEDICATION HISTORY
Pharmacy Note: LTACH Admission Drug Regimen Review    There was not an initial admission medication history was completed at [facility] by pharmacy but the PTA list was updated at [facility]. Could not discern who updated it. Here is a copy of that list below. The list below IS NOT the current medication list that is in the PTA tab.    Prior to Admission Medications   Prescriptions Last Dose Informant Patient Reported? Taking?   Cholecalciferol (VITAMIN D3 GUMMIES PO)     Yes Yes   Sig: Take 1 chew tab by mouth daily   Homeopathic Products (SLEEP CALM SLEEP RELIEF SL) Past Week at hs   Yes Yes   Sig: Take by mouth nightly as needed Magnesium to help with sleep   Multiple Vitamins-Minerals (PRESERVISION AREDS PO)     Yes Yes   Sig: Take 1 capsule by mouth daily   UNABLE TO FIND     Yes Yes   Sig: Take 2 chew tab by mouth daily Human[employer] SuperBeets Heart Chews (Grape Seed Extract 150mg + Beet Root Powder 500mg)   acetaminophen (TYLENOL) 500 MG tablet Past Week   Yes Yes   Sig: Take 1,000 mg by mouth every 6 hours as needed for mild pain   amLODIPine (NORVASC) 5 MG tablet     No No   Sig: TAKE 1 TABLET EVERY DAY   atenolol (TENORMIN) 50 MG tablet     Yes No   Sig: Take 50 mg by mouth daily   calcium carbonate (TUMS) 500 MG chewable tablet Past Week at often   Yes Yes   Sig: Take 1-2 chew tab by mouth 4 times daily as needed for heartburn   famotidine (PEPCID) 20 MG tablet Past Week at often   Yes Yes   Sig: Take 20 mg by mouth 2 times daily as needed (heartburn)   furosemide (LASIX) 20 MG tablet ?Past Week at ? x1   Yes Yes   Sig: Take 20 mg by mouth once   omeprazole 20 MG tablet Past Week   Yes Yes   Sig: Take 20 mg by mouth daily   order for DME     No No   Sig: Equipment being ordered: Class 2 (30-40mmHg) compression knee high stockings, night time knee high compression alternative, bandaging supplies   Patient not taking: Reported on 2/20/2019   order for DME     No No   Sig: Equipment being  ordered: Compression knee high stockings for B LE lymphedema 20-30mmHg   Patient not taking: Reported on 2/20/2019   tacrolimus (GENERIC EQUIVALENT) 1 MG capsule Unknown at Only able to verify did not have AM 11/12/22   No No   Sig: TAKE 1 CAPSULE EVERY MORNING  AND TAKE 2 CAPSULES EVERY EVENING  DOSED  12  HOURS APART        Medication orders were signed & held for discharge from transferring facility? No - The Prior to Admission (PTA) medication list has been updated to reflect medications on discharge from [facility]. This is the current PTA list below.    PTA Med List   Medication Sig Last Dose     acetylcysteine (MUCOMYST) 20 % neb solution Take 2 mLs by nebulization 2 times daily      albuterol (PROVENTIL) (2.5 MG/3ML) 0.083% neb solution Take 1 vial (2.5 mg) by nebulization every 2 hours as needed for shortness of breath      apixaban ANTICOAGULANT (ELIQUIS) 5 MG tablet 1 tablet (5 mg) by Oral or Feeding Tube route 2 times daily      calcium carbonate (TUMS) 500 MG chewable tablet Take 1-2 chew tab by mouth 4 times daily as needed for heartburn      carboxymethylcellulose PF (REFRESH PLUS) 0.5 % ophthalmic solution Place 1 drop into both eyes 3 times daily      epoetin mirta-epbx (RETACRIT) 34731 UNIT/ML injection Inject 1 mL (10,000 Units) Subcutaneous once for 1 dose      famotidine (PEPCID) 20 MG tablet Take 20 mg by mouth 2 times daily as needed (heartburn)      [START ON 12/16/2022] folic acid 5 MG/ML injection Inject 0.2 mLs (1 mg) into the vein daily      glucose 40 % (400 mg/mL) gel Take 15-30 g by mouth every 15 minutes as needed for low blood sugar      hydrocortisone sodium succinate PF (SOLU-CORTEF) 100 MG injection Inject 0.5 mLs (25 mg) into the vein every 6 hours      [START ON 12/16/2022] insulin glargine (LANTUS PEN) 100 UNIT/ML pen Inject 30 Units Subcutaneous every morning (before breakfast)      ipratropium - albuterol 0.5 mg/2.5 mg/3 mL (DUONEB) 0.5-2.5 (3) MG/3ML  neb solution Take 1 vial (3 mLs) by nebulization 4 times daily      lactulose (CHRONULAC) 10 GM/15ML solution 30 mLs (20 g) by Oral or Feeding Tube route 3 times daily      midodrine (PROAMATINE) 10 MG tablet 1 tablet (10 mg) by Oral or Feeding Tube route 3 times daily (with meals)      [START ON 12/16/2022] Multiple Vitamins-Minerals (MULTIVITAMINS W/MINERALS) liquid 15 mLs by Per Feeding Tube route daily      nystatin (MYCOSTATIN) 024351 UNIT/ML suspension Swish and swallow 5 mLs (500,000 Units) in mouth 4 times daily      ondansetron (ZOFRAN ODT) 4 MG ODT tab Take 1 tablet (4 mg) by mouth every 6 hours as needed for nausea or vomiting      oxyCODONE (ROXICODONE) 5 MG tablet 1 tablet (5 mg) by Per Feeding Tube route every 4 hours as needed for moderate pain (4-6)      [START ON 12/16/2022] pantoprazole (PROTONIX) 2 mg/mL SUSP suspension 20 mLs (40 mg) by Per Feeding Tube route every morning (before breakfast)      rifaximin (XIFAXAN) 550 MG TABS tablet 1 tablet (550 mg) by Per Feeding Tube route 2 times daily      tacrolimus (GENERIC EQUIVALENT) 1 mg/mL suspension 1 mL (1 mg) by Oral or Feeding Tube route 2 times daily        Admission drug regimen review has been completed. The following medication issues were identified.    1. Will need pharmacy immunosuppression consult to manage the patient's tacrolimus dosing  2. Will need stop date on Nystatin  3. Renal note on 12/15 mentions 6000 units of EPO to be given with MWF dialysis. Will need order for EPO.    The following PTA medications were not continued during hospitalization at [facility]: Amlodipine, atenolol, and furosemide. Please assess whether a future restart would be appropriate.     Thank you for the opportunity to participate in the care of this patient.    Billy Peña RPH  12/15/2022 3:35 PM

## 2022-12-15 NOTE — DISCHARGE SUMMARY
Discharge summary  Date of admission: November 12, 2022  Date of discharge: December 15, 2022  Discharge to [facility]    Medications: see orders    List of diagnoses:  S/P cardiac arrest with bystander CPR prior to admission and reason for admission  Acute respiratory failure, s/p pneumonia, ARDS, possible underlying COPD and now chronic vent dependent but weaning slowly; vent 30%, +5 PEEP 450 mls, rate 16  Right sided chest tube due to recurrent/persistent pneumothorax - chest tube in place  S/p pneumonia due to strep pneumonia and parainfluenza  Recurrent atelectasis- on QID pulmonary hygiene measures with mucomyst, duonebs, and chest physiotherapy and he requires this ongoing.   Possible yeast pneumonia from Asprgillus terreus in sputum and s/p 1 week course of therapy with voriconazole   Septic shock, resolved  Acute renal failure this admission, still hemodialysis dependent   HTN  CHRISTIAN on chronic CPAP therapy  Enteral nutrition - on novasource renal at 60 mls/hr  CNS- awake and following  Commands and seemingly well compensated cognitive function  Atrial fibrillation, paroxysmal on apixaban  Anticoagulation with apixaban   Liver transplant (CARRILLO) 2016, on chronic suppressive immunotherapy; chronic degree of cirrhosis;  History of alcohol abuse   Critical illness myopathy   History of embolic stroke   Protein calorie malnutrition, severe   Mild leukopenia (WBC 3-4k) and thrombocytopenia (70-80k) due to chronic illness and immunosuppressive meds  Anemia, due to chronic illness and dialysis, Hb typically 7-8 on epogen  Picc line placed December 6, 2022    Brief summary of hospitalization:  He was admitted with acute respiratory failure due to strep pneumonia and parainfluenza; he was considered an immunosuppressed host given liver transplant and immunosuppressive meds.     He had septic shock which slowly resolved over time.     He had acute respiratory failure which slowly improved over time. Complicating  factors is that he failed extubation twice. He had a right sided chest tube which was removed but then replaced when pneumothorax recurred.     He developed acute renal failure due to this illness and is dialysis dependent as of this note.     At time of transfer He has a trach and peg and is on 30% oxygen and +5 PEEP. He is tolerating trach dome trials for 12 hours daily off vent; he is tolerating enteral nutrition. Although it is difficult to assess fine cognition, he is fully awake and alert and following commands. He is very weak throughout all extremities due to CIP    He is tolerating enteral nutrition    He is on tacrolimus and solucortef for his liver transplant and University physicians from this service will check on him routinely.    He is discharged to [facility] for further care.     This discharge has taken more than 30 minutes    germain fitch  December 15, 2022

## 2022-12-15 NOTE — PROGRESS NOTES
RT PROGRESS NOTE     DATA:     CURRENT SETTINGS:             TRACH TYPE / SIZE:  #6 Shiley XLT Proximal (placed 11/29)             MODE:   AC 16, 450, +5             FIO2:   35%     ACTION:             THERAPIES:   Duoneb/Mucomyst/Volara QID             SUCTION:                           FREQUENCY:   x1                        AMOUNT:   Moderate                        CONSISTENCY:   Thick                        COLOR:   Pale Yellow             SPONTANEOUS COUGH EFFORT/STRENGTH OF EFFORT (not elicited by suctioning): Not Witnessed                           WEANING PHASE:   No Wean                        WEAN MODE:    NA                        WEAN TIME:   NA                        END WEAN REASON:   NA     RESPONSE:             BS:   Coarse/Diminished             VITAL SIGNS:   Sating %, -104, RR 23-24             EMOTIONAL NEEDS / CONCERNS:  NA                RISK FOR SELF DECANNULATION:  No       NOTE / PLAN:   Pt is a new admit this afternoon.  Start Phase 2 tomorrow TM with cuff up day and AC at night.  BDT ordered for tomorrow and PMV with SLP only.  RT will continue to monitor.

## 2022-12-15 NOTE — CONSULTS
Consultation - Pulmonary Medicine  Blanco Osborne,  1965, MRN [id number]    Acute on chronic respiratory failure after trauma (H) [J96.20]   Code status:  Full Code       Extended Emergency Contact Information  Primary Emergency Contact: Dylan Osborne   [facility]  Home Phone: [phone]  Mobile Phone: [phone]  Relation: Brother  Secondary Emergency Contact: Ofe Osborne  Home Phone: [phone]  Mobile Phone: [phone]  Relation: Spouse       Impressions:   57-year-old man with a history of liver transplant admitted  with respiratory failure secondary to pneumonia complicated by cardiac arrest and prolonged resuscitation s/p trach and peg.       Acute hypoxic/hypercapnic respiratory failure.  Multifactorial related to pneumonia, ARDS, possible underlying COPD, PTX, pleural effusions.  Failed extubation x2 s/p trach.  Looks like he was on trach dome continuously since yesterday afternoon, then placed back on vent prior to transfer.      Recurrent right sided pneumothorax: Chest tube in place w/ small amt bloody output; on water seal     Tracheostomy: 8 Shiley placed .  Changed to 6 Shiley prox XLT      B/l pleural effusions s/p L thora (1.5L), (200mL removed)     Dialysis dependent renal failure.  Following with nephrology.     Infectious disease: Complex picture with strep, parainfluenza, Aspergillus of unclear significance.  Completed 15 days of Rocephin and 1 week course of Voriconazole     Liver transplant (CARRILLO) 2016: tacro and steroids.  Mgmt per tx service and [facility]  Ascites s/p paracentesis : cytology neg malignancy      CHRISTIAN on BIPAP when not on vent      Dysphagia s/p PEG with enteral feeds      Atrial fibrillation, likely paroxysmal and with concern of prior CVA's on apixaban      Pancytopenia due to chronic illness and immunosuppressive meds     Severely deconditioned      Recommendations and Plans:   - Hold weans today and will let rest on vent tonight,  start trach dome weans tomorrow  - Duonebs + mucomyst + metanebs QID  - BDT tomorrow and consider PMV trials with SLP  - Hold on trach change for now until have a better sense on how deconditioned he is   - Keep chest tube to water seal and check CxR in the morning.  If becomes sob or has sudden hemodynamic instability(hypotension, tachycardia), place tube to -20 cm H2O wall suction            Chief Complaint Acute on chronic respiratory failure after trauma (H)       HPI   I have been asked by Dr. Barcenas to see Blanco Osborne in consultation for trach and ventilator management.    Blanco Osborne is a 57 year old year old male admitted [facility]/15/2022 for ongoing treatment of respiratory failure.    The referring facility is [facility].  Records from that facility are available to assist in historical details.  Further history is provided by wife.    admitted with cardiac arrest and acute respiratory failure due to strep pneumonia and parainfluenza; he was considered an immunosuppressed host given liver transplant and immunosuppressive meds.      He had septic shock which slowly resolved over time.      He had acute respiratory failure which slowly improved over time. Complicating factors is that he failed extubation twice. He had a right sided chest tube which was removed but then replaced when pneumothorax recurred.  B/l pleural effusions s/p repeat thora, pneumonia treated with antibiotics and antifungals.       He developed acute renal failure due to this illness and is dialysis dependent as of this note.      He is tolerating enteral nutrition     He is on tacrolimus and solucortef for his liver transplant and University physicians from this service will check on him routinely.     Discharged to [facility] 12/154 for further care.     Subjective  Transferred on vent.  Stable on 35% FiO2 with SpO2 100%.  I turned him down to 30%.  No distress.  He's awake and following  commands.   Good air movement bilaterally.    Chest tube intact with small amount of bloody drainage, currently to water seal, no leak observed    Bubbling pale yellow secretions from trach stoma.    Pt denies sob, pain, n/v, fever, chills  Wife at bedside, all questions answered        Medical History  [unfilled]  [unfilled] Social History  Reviewed, and he  reports that he has never smoked. He has never used smokeless tobacco. He reports that he does not currently use alcohol. He reports that he does not use drugs.    Smoking history:   History   Smoking Status     Never   Smokeless Tobacco     Never    Family History  Reviewed, and family history is not on file.   Allergies  No Known Allergies           Current Medications:     acetylcysteine  2 mL Nebulization BID     apixaban ANTICOAGULANT  5 mg Oral or Feeding Tube BID     carboxymethylcellulose PF  1 drop Both Eyes TID     [START ON 12/16/2022] folic acid  1 mg Intravenous Daily     hydrocortisone sodium succinate PF  25 mg Intravenous Q6H     [START ON 12/16/2022] insulin glargine  30 Units Subcutaneous QAM AC     ipratropium - albuterol 0.5 mg/2.5 mg/3 mL  3 mL Nebulization 4x Daily     lactulose  20 g Oral or Feeding Tube TID     midodrine  10 mg Oral or Feeding Tube TID w/meals     [START ON 12/16/2022] multivitamins w/minerals  15 mL Per Feeding Tube Daily     nystatin  500,000 Units Swish & Swallow 4x Daily     [START ON 12/16/2022] pantoprazole  40 mg Per Feeding Tube QAM AC     rifaximin  550 mg Per Feeding Tube BID     tacrolimus  1 mg Oral or Feeding Tube BID          Review of Systems:  A 10-system review was obtained and is negative with the exception of the symptoms noted above. Physical Exam:  Temp:  [97.5  F (36.4  C)-98.8  F (37.1  C)] 98.2  F (36.8  C)  Pulse:  [] 90  Resp:  [14-77] 22  BP: ()/(56-93) 118/76  FiO2 (%):  [35 %-40 %] 35 %  SpO2:  [90 %-100 %] 100 %  [unfilled]  Ventilator settings: Vent Mode: CMV/AC   (Continuous Mandatory Ventilation/ Assist Control)  FiO2 (%): 35 %  Resp Rate (Set): 16 breaths/min  Tidal Volume (Set, mL): 450 mL  PEEP (cm H2O): 5 cmH2O  Resp: 22      EXAM:  Physical Exam  Gen: no distress on full vent, mild anasarca  HEENT: NT, trach midline/intact  CV: RRR, no m/g/r  Resp: CTAB; non-labored, right sided chest tube site covered with dressing c/d/i  Abd: soft, nontender, BS+, large   Skin: no visible rashes or lesions  Ext: mild edema  Neuro: alert, follows commands, mouths words       Pertinent Labs:  Lab Results: personally reviewed.   Recent Labs   Lab 12/15/22  0414   WBC 3.7*   HGB 7.3*   HCT 24.7*   PLT 78*     Recent Labs   Lab 12/15/22  0414 12/14/22  0412 12/13/22  0427 12/12/22  0431 12/10/22  1017 12/09/22  0423    136 136 138   < > 136   CO2 30 28 29 27   < > 28   BUN 72* 96* 72* 104*   < > 70*   ALKPHOS  --   --   --  241*  --  301*   ALT  --   --   --  15  --  20   AST  --   --   --  21  --  20    < > = values in this interval not displayed.     Venous Blood Gas  Recent Labs   Lab 12/10/22  2022   PHV 7.41   PCO2V 44   PO2V 43   HCO3V 28   MITA 3.2*   O2PER 30        Pertinent Radiology:  Radiology results: images and reports personally viewed; radiology read below   CHEST SINGLE VIEW PORTABLE  LOCATION: [facility]  DATE/TIME: 12/13/2022 5:57 AM     INDICATION: Assess pneumothorax.  COMPARISON: 12/11/2022 at 0620 hours.                                                                      IMPRESSION:   1. Interval placement of a right pleural drain with distal tip projecting over the lateral aspect of the lower right hemithorax.  2. Interval complete resolution of what was a small right basilar pneumothorax since the recent comparison study.  3. Tiny right pleural effusion and small left pleural effusion again noted.  4. No other significant interval change.  5. An endotracheal tube, right upper extremity peripherally inserted central catheter and a  right internal jugular dual-lumen central venous catheter are again noted.   -  CT CHEST W/O CONTRAST  LOCATION: [facility]  DATE/TIME: 12/10/2022 7:45 PM     INDICATION: right basilar pneumothorax  COMPARISON: Chest x-rays from today. CT from 2/1/2020  TECHNIQUE: CT chest without IV contrast. Multiplanar reformats were obtained. Dose reduction techniques were used.  CONTRAST: None.     FINDINGS:   LUNGS AND PLEURA: Moderate right hydropneumothorax predominantly at right lung base. Difficult to compare directly but likely the size of the right hydropneumothorax has not changed significantly compared to most recent chest x-ray. Atelectasis of right   lower lobe.  There is also moderate left pleural effusion and atelectasis at left lung base.     MEDIASTINUM/AXILLAE: Tracheostomy tube and tunneled dialysis catheter remain in good position. Heart size normal. No adenopathy..     CORONARY ARTERY CALCIFICATION: Moderate.     UPPER ABDOMEN: Postoperative changes from hepatic transplant.     MUSCULOSKELETAL: Old right rib fractures.                                                                      IMPRESSION:   1.  Moderate right hydropneumothorax. Atelectasis right lower lobe.  2.  Moderate left pleural effusion with atelectasis left lower lobe.     Other pertinent data:  Limited Echocardiogram 11/13/2022  Interpretation Summary     The visual ejection fraction is 55-60%.  There is mild (1+) tricuspid regurgitation.  There is mild (1+) aortic regurgitation.  large pleural effusion and ascietes noted  The rhythm was sinus bradycardia.     Tracheostomy tube data:  Date of initial placement: 11/29  Current tube - type: Shiley , size: 6 prox xlt       Extensive record review is performed.  Key information about patient is reviewed in detail.  The respiratory plan of care is discussed with RT.  This patient will be rounded on regularly while requiring mechanical ventilator support.  Please contact  us with questions or concerns.    Total time spent on pt examination and coordination of care was 75min   Henry Fenton CNP  Pulmonary Medicine  [facility]  Pager [phone]

## 2022-12-15 NOTE — PROGRESS NOTES
Care Management Follow Up    Length of Stay (days): 33    Expected Discharge Date: 12/15/2022     Concerns to be Addressed: discharge planning     Patient plan of care discussed at interdisciplinary rounds: Yes    Anticipated Discharge Disposition:  (LTACH)     Anticipated Discharge Services: Transportation Services (stretcher)  Anticipated Discharge DME: Oxygen (ventilator)    Patient/family educated on Medicare website which has current facility and service quality ratings: yes  Education Provided on the Discharge Plan:    Patient/Family in Agreement with the Plan: yes    Referrals Placed by CM/SW: Post Acute Facilities  Private pay costs discussed: TBD    Additional Information:  Following for discharge planning. Plan for LTACH at 1300 today.   CC talked with CHRISTOPHER Sanchez Liaison. She will follow for orders in Epic    MD Name/Number needed for MD-MD report.  Reported to Laura (via teams) to call ICU number and ask for Dr. Jones after 11:30    ICU nurse should call [phone] for nurse to nurse report.    PCS filled out and on chart    For wife:  Address is [address]  4[address] floor CC will update after rounds.     CC to continue to follow.       Alethea Mclean RN

## 2022-12-15 NOTE — PROGRESS NOTES
"Physical Therapy Discharge Summary    Reason for therapy discharge:    Discharged to [facility]    Progress towards therapy goal(s). See goals on Care Plan in [facility] electronic health record for goal details.  Goals partially met.  Barriers to achieving goals:   discharge from facility.    Therapy recommendation(s):    Continued therapy is recommended.  Rationale/Recommendations:  ongoing PT. Last PT: \"MaxAx2 bed mobility, ibeth for transfers, LE move against gravity, UE some activation\".      "

## 2022-12-15 NOTE — H&P
LTACH    History and Physical - Hospitalist Service       Date of Admission:  12/15/2022    Assessment & Plan       Acute on chronic respiratory failure - he remains on 30% and +5 PEEP, trach in place  History of COPD?  Plan:  -Now on trach dome, PEEP 5 FiO2 30%  -Will consult Pulmonology for vent weaning  -RT for trach care  -Good pulmonary hygiene  -Bronchodilators    Pneumothorax.  -Right sided pneumothorax, chest tube in place. Plan to continue  -Monitor    Hx of pneumonia.  Stable  History of septic shock.  Resolved, off pressors. Stable  -Noted to have strep and parainfluenza pneumonia.  Completed course of antibiotics  -ID consulted from an outside hospital.  Was treated with voriconazole and also completed 15 days of ceftriaxone.  -Stable at this time.  Continue to monitor    Acute renal failure  -Secondary to septic shock  -S/p initial arrest and prolonged resuscitation (11/9/22) and hypothermia resuscitation   -On intermittent Hemodialysis    History of Liver Transplant 2016  Immunosuppression   - Tacrolimus at 1 mg bid  -  Solucortef.  -  Rifaximin    Paroxysmal a-fib. Stable  -Rate controlled at this time  -Not on rate control medication. On Eliquis for anticoagulation    CHRISTIAN  -Currently on the vent. On BiPAP previously    Dysphagia  -On tube feedings  -PEG tube in place  -Will consult nutritionist/RD  -Nova-source renal @60ml/hr      Diet: Adult Formula Drip Feeding: Continuous Novasource Renal; Gastrostomy; Goal Rate: 60; mL/hr    DVT Prophylaxis: DOAC  Nixon Catheter: Not present  Central Lines: Cardiac Monitoring: ACTIVE order. Indication: History of paroxysmal A. fib  Code Status: Full Code      Clinically Significant Risk Factors Present on Admission         # Hyponatremia: Lowest Na = 135 mmol/L in last 2 days, will monitor as appropriate      # Hypoalbuminemia: Lowest albumin = 1.8 g/dL at 12/15/2022  4:14 AM, will monitor as appropriate  # Drug Induced Coagulation Defect: home medication list  includes an anticoagulant medication  # Thrombocytopenia: Lowest platelets = 58 in last 2 days, will monitor for bleeding             Disposition Plan      Expected Discharge Date: 12/21/2022                The patient's care was discussed with the Bedside Nurse and Patient.    Yahir Barcenas MD  Hospitalist Service  LTACH  Securely message with the Vocera Web Console (learn more here)  Text page via [facility] Paging/Directory     ______________________________________________________________________    Chief Complaint   Acute on chronic respiratory failure    History is obtained from the patient and electronic health record    History of Present Illness   Blanco Osborne is a 57 year old male who is known to have paroxysmal A. fib, suspected COPD and hypertension.  He was admitted to an outside acute care hospital 11/22 for acute respiratory failure secondary to pneumonia.  His stay was complicated with cardiac arrest with prolonged resuscitation.  He was intubated and put on mechanical ventilation.  Failed extubation x2 .  Noted to be in ARDS now status post tracheostomy.  Noted to be in septic shock with multiorgan failure.  He requires intermittent hemodialysis for acute renal failure.  He stay at outside hospital was complicated with recurrent right-sided pneumothorax.  He now has chest tube in place on waterseal.  He had strep pneumonia, parainfluenza and Aspergillus.  Infectious disease consulted and completed 15-day course of ceftriaxone and 1 week course of voriconazole.  Noted to have ascites status post paracentesis 12/1.  Cytology negative for malignancy.  He is now on tube feedings for dysphagia and is physically deconditioned.  He has been transferred to LTAC for vent weaning and further cares.    Review of Systems    All other systems are reviewed and found to be negative except as stated above in the history of presenting illness    Past Medical History    I have reviewed this patient's medical history  and updated it with pertinent information if needed.   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  Past Surgical History:   Procedure Laterality Date     APPENDECTOMY       BENCH LIVER N/A 2016    Procedure: BENCH LIVER;  Surgeon: Enoc Crews MD;  Location: [facility] OR     COLONOSCOPY  13    repeat in 2018     COLONOSCOPY N/A 10/4/2019    Procedure: COLONOSCOPY, WITH POLYPECTOMY AND BIOPSY;  Surgeon: Go Chong MD;  Location: [facility] OR     HERNIA REPAIR       IR CHEST TUBE PLACEMENT NON-TUNNELED RIGHT  2022     IR CVC TUNNEL PLACEMENT > 5 YRS OF AGE  2022     IR GASTROSTOMY TUBE PERCUTANEOUS PLCMNT  2022     IR PARACENTESIS  2022     IR PARACENTESIS  2022     IR THORACENTESIS  2022     IR TRANSCATHETER BIOPSY  2022     TRACHEOSTOMY N/A 2022    Procedure: TRACHEOSTOMY;  Surgeon: Nilesh Jackson MD;  Location:  OR     TRANSPLANT LIVER RECIPIENT  DONOR N/A 2016    Procedure: TRANSPLANT LIVER RECIPIENT  DONOR;  Surgeon: Enoc Crews MD;  Location: [facility] OR       Social History   I have reviewed this patient's social history and updated it with pertinent information if  needed.  Social History     Tobacco Use     Smoking status: Never     Smokeless tobacco: Never   Substance Use Topics     Alcohol use: Not Currently     Alcohol/week: 0.0 standard drinks     Drug use: No       Family History     No significant family history, including no history of: Diabetes    Prior to Admission Medications   Prior to Admission Medications   Prescriptions Last Dose Informant Patient Reported? Taking?   Multiple Vitamins-Minerals (MULTIVITAMINS W/MINERALS) liquid   No Yes   Sig: 15 mLs by Per Feeding Tube route daily   acetylcysteine (MUCOMYST) 20 % neb solution   No Yes   Sig: Take 2 mLs by nebulization 2 times daily   albuterol (PROVENTIL) (2.5 MG/3ML) 0.083% neb solution   No Yes   Sig: Take 1 vial (2.5 mg) by nebulization every 2 hours as needed for shortness of breath   apixaban ANTICOAGULANT (ELIQUIS) 5 MG tablet   No Yes   Si tablet (5 mg) by Oral or Feeding Tube route 2 times daily   calcium carbonate (TUMS) 500 MG chewable tablet   Yes Yes   Sig: Take 1-2 chew tab by mouth 4 times daily as needed for heartburn   carboxymethylcellulose PF (REFRESH PLUS) 0.5 % ophthalmic solution   No Yes   Sig: Place 1 drop into both eyes 3 times daily   epoetin mirta-epbx (RETACRIT) 56411 UNIT/ML injection   No Yes   Sig: Inject 1 mL (10,000 Units) Subcutaneous once for 1 dose   famotidine (PEPCID) 20 MG tablet   Yes Yes   Sig: Take 20 mg by mouth 2 times daily as needed (heartburn)   folic acid 5 MG/ML injection   No Yes   Sig: Inject 0.2 mLs (1 mg) into the vein daily   glucose 40 % (400 mg/mL) gel   No Yes   Sig: Take 15-30 g by mouth every 15 minutes as needed for low blood sugar   hydrocortisone sodium succinate PF (SOLU-CORTEF) 100 MG injection   No Yes   Sig: Inject 0.5 mLs (25 mg) into the vein every 6 hours   insulin glargine (LANTUS PEN) 100 UNIT/ML pen   No Yes   Sig: Inject 30 Units Subcutaneous every morning (before breakfast)   ipratropium - albuterol 0.5 mg/2.5 mg/3 mL (DUONEB) 0.5-2.5  (3) MG/3ML neb solution   No Yes   Sig: Take 1 vial (3 mLs) by nebulization 4 times daily   lactulose (CHRONULAC) 10 GM/15ML solution   No Yes   Si mLs (20 g) by Oral or Feeding Tube route 3 times daily   midodrine (PROAMATINE) 10 MG tablet   No Yes   Si tablet (10 mg) by Oral or Feeding Tube route 3 times daily (with meals)   nystatin (MYCOSTATIN) 210598 UNIT/ML suspension   No Yes   Sig: Swish and swallow 5 mLs (500,000 Units) in mouth 4 times daily   ondansetron (ZOFRAN ODT) 4 MG ODT tab   No Yes   Sig: Take 1 tablet (4 mg) by mouth every 6 hours as needed for nausea or vomiting   order for DME   No No   Sig: Equipment being ordered: Class 2 (30-40mmHg) compression knee high stockings, night time knee high compression alternative, bandaging supplies   Patient not taking: Reported on 2019   order for DME   No No   Sig: Equipment being ordered: Compression knee high stockings for B LE lymphedema 20-30mmHg   Patient not taking: Reported on 2019   oxyCODONE (ROXICODONE) 5 MG tablet   No Yes   Si tablet (5 mg) by Per Feeding Tube route every 4 hours as needed for moderate pain (4-6)   pantoprazole (PROTONIX) 2 mg/mL SUSP suspension   No Yes   Si mLs (40 mg) by Per Feeding Tube route every morning (before breakfast)   rifaximin (XIFAXAN) 550 MG TABS tablet   No Yes   Si tablet (550 mg) by Per Feeding Tube route 2 times daily   tacrolimus (GENERIC EQUIVALENT) 1 mg/mL suspension   No Yes   Si mL (1 mg) by Oral or Feeding Tube route 2 times daily      Facility-Administered Medications: None     Allergies   No Known Allergies    Physical Exam   Vital Signs: Temp: 98.2  F (36.8  C) Temp src: Oral BP: 118/76 Pulse: 90   Resp: 22 SpO2: 100 % O2 Device: Mechanical Ventilator    Weight: 196 lbs 3.2 oz  General: Is a well grown well-developed adult male resting in bed comfortably no distress  HEENT: Head is normocephalic atraumatic eyes pupils appear equal round and reactive to light  conjunctiva is moist and pink sclera anicteric.  Neck: Viable trach noted  Lungs: On mechanical ventilation, good air entry is noted, no obvious wheezing or crackles noted.  Right sided chest tube noted  Heart: He has a good S1 and S2 no obvious murmurs, no JVD peripheral pulses are palpable  Abdomen: Soft nontender nondistended bowel sounds are normoactive no obvious organomegaly, PEG tube noted  Musculoskeletal: He has good muscle tone, nails and digits appear acyanotic, no obvious lower extremity edema noted bilaterally I did not examine his range of motion  Skin: No obvious rashes noted he is warm to touch skin turgor appear normal.      Data   Data reviewed today: I reviewed all medications, new labs and imaging results over the last 24 hours. I personally reviewed    Recent Results (from the past 24 hour(s))   XR Chest Port 1 View   Result Value    Radiologist flags New left pneumothorax (AA)    Narrative    EXAM: XR CHEST PORT 1 VIEW  LOCATION: [facility]  DATE/TIME: 12/16/2022 8:49 AM    INDICATION: assess PTX  COMPARISON: Portable chest radiography 12/13/2022      Impression    IMPRESSION:     Similar size of moderate left pleural effusion. There is a new left pneumothorax visible in the lateral left apex and associated angular opacity related to the left lung towards the hilum consistent with related atelectasis.    Pleural drain in the right lateral chest is similar in position. No visible right pneumothorax. Opacity in the mid and lower right chest relates to layering pleural fluid and related atelectasis.    Right PICC extends to the middle third of the SVC and tunneled right jugular dialysis catheter terminates in the right atrium. Tracheostomy resides within the tracheal air column.    Left ventricular heart border is increasingly obscured by pleural fluid and adjacent lung opacity. Right atrial heart border is normal.      [Critical Result: New left pneumothorax]    Finding  was identified on 12/16/2022 8:50 AM.     Patchy, the charge nurse at [facility] was contacted by me on 12/16/2022 8:59 AM and verbalized understanding of the critical result.

## 2022-12-15 NOTE — PROGRESS NOTES
[facility]    Blanco Osborne has been accepted for admission to [facility] today.    Ride: 1300    RN to RN report: Please call Unit [address] at [phone] for nurse to nurse report before the patient discharges.     Accepting MD: Dr. Eboni Canada. Dr. Canada has been provided the contact information for Mr. Osborne's sending physician (Dr. Jones) and will reach out to Dr. Jones for provider to provider report before the patient discharges.     Documentation needed prior to discharge: Discharge summary and discharge/readmission orders.     NO Transfer packet needed.           Laura Coughlin, PT, DPT  [facility] Referral Specialist    [facility]  [address] [place] [id number]  Office: [phone] Fax: [phone]     CONFIDENTIAL Protected under [place] Statute  145.61 et seq

## 2022-12-15 NOTE — PROGRESS NOTES
Patient remained on HF trach dome all night, 30L 35%. SpO2 mid-90's.   No issues.    RT to follow    Guido Mclain, RT, 4:35 AM 12/15/22

## 2022-12-15 NOTE — PROGRESS NOTES
Pt transferred from [facility] @ 1435, admitted to 4110. Post liver transplant complications. . Pt alert and oriented X 4. Denies pain or discomfort at this time. Sepsis protocol fired up MD aware, protocol ordered, receiving RN notified. Pt oriented to room and surroundings, wrist band applied. Pt appeared comfortable, will continue monitoring.

## 2022-12-15 NOTE — PROGRESS NOTES
Discharged to: [facility] at 1345  Belongings: Phone with wife, confirmed this with brother.  Patient care belongings, and specific medications sent with patient.    AVS (After Visit Summary) discussed with: Dr. Jones, care coordinator, and pharmacist.

## 2022-12-15 NOTE — PROGRESS NOTES
Pt is a new admit at arrived at 2:45pm on a vent.  Pt was placed on our vent AC 16, 450, +5, 35%.  RT will continue to monitor.

## 2022-12-16 ENCOUNTER — HOSPITAL ENCOUNTER (EMERGENCY)
Facility: CLINIC | Age: 57
Discharge: OTHER INSTITUTION | End: 2022-12-16
Attending: STUDENT IN AN ORGANIZED HEALTH CARE EDUCATION/TRAINING PROGRAM | Admitting: STUDENT IN AN ORGANIZED HEALTH CARE EDUCATION/TRAINING PROGRAM
Payer: COMMERCIAL

## 2022-12-16 ENCOUNTER — APPOINTMENT (OUTPATIENT)
Dept: SPEECH THERAPY | Facility: CLINIC | Age: 57
DRG: 208 | End: 2022-12-16
Attending: HOSPITALIST
Payer: COMMERCIAL

## 2022-12-16 ENCOUNTER — APPOINTMENT (OUTPATIENT)
Dept: RADIOLOGY | Facility: CLINIC | Age: 57
End: 2022-12-16
Attending: PHYSICIAN ASSISTANT
Payer: COMMERCIAL

## 2022-12-16 ENCOUNTER — APPOINTMENT (OUTPATIENT)
Dept: GENERAL RADIOLOGY | Facility: CLINIC | Age: 57
DRG: 207 | End: 2022-12-16
Attending: INTERNAL MEDICINE
Payer: COMMERCIAL

## 2022-12-16 ENCOUNTER — ANCILLARY PROCEDURE (OUTPATIENT)
Dept: GENERAL RADIOLOGY | Facility: CLINIC | Age: 57
DRG: 208 | End: 2022-12-16
Attending: NURSE PRACTITIONER
Payer: COMMERCIAL

## 2022-12-16 ENCOUNTER — HOSPITAL ENCOUNTER (INPATIENT)
Facility: CLINIC | Age: 57
LOS: 13 days | Discharge: NURSING, REHAB OR LONG-TERM CARE | DRG: 207 | End: 2022-12-29
Attending: SURGERY | Admitting: INTERNAL MEDICINE
Payer: COMMERCIAL

## 2022-12-16 VITALS
SYSTOLIC BLOOD PRESSURE: 117 MMHG | BODY MASS INDEX: 29.97 KG/M2 | RESPIRATION RATE: 16 BRPM | TEMPERATURE: 98.2 F | WEIGHT: 221 LBS | OXYGEN SATURATION: 97 % | HEART RATE: 97 BPM | DIASTOLIC BLOOD PRESSURE: 75 MMHG

## 2022-12-16 VITALS
OXYGEN SATURATION: 100 % | BODY MASS INDEX: 29.97 KG/M2 | TEMPERATURE: 97.4 F | RESPIRATION RATE: 16 BRPM | SYSTOLIC BLOOD PRESSURE: 101 MMHG | DIASTOLIC BLOOD PRESSURE: 63 MMHG | HEART RATE: 81 BPM | WEIGHT: 221 LBS

## 2022-12-16 DIAGNOSIS — B10.81 HUMAN HERPESVIRUS 6 VIREMIA: ICD-10-CM

## 2022-12-16 DIAGNOSIS — I63.413 CEREBROVASCULAR ACCIDENT (CVA) DUE TO BILATERAL EMBOLISM OF MIDDLE CEREBRAL ARTERIES (H): ICD-10-CM

## 2022-12-16 DIAGNOSIS — R56.9 SEIZURES (H): Primary | ICD-10-CM

## 2022-12-16 DIAGNOSIS — J94.2 HEMOPNEUMOTHORAX ON LEFT: Primary | ICD-10-CM

## 2022-12-16 DIAGNOSIS — I95.9 HYPOTENSION, UNSPECIFIED HYPOTENSION TYPE: ICD-10-CM

## 2022-12-16 DIAGNOSIS — D62 ANEMIA DUE TO BLOOD LOSS, ACUTE: ICD-10-CM

## 2022-12-16 DIAGNOSIS — I46.9 CARDIAC ARREST (H): ICD-10-CM

## 2022-12-16 PROBLEM — J93.9 PNEUMOTHORAX ON LEFT: Status: ACTIVE | Noted: 2022-12-16

## 2022-12-16 LAB
ABO/RH(D): NORMAL
ALBUMIN SERPL BCG-MCNC: 2.2 G/DL (ref 3.5–5.2)
ALBUMIN SERPL-MCNC: 1.7 G/DL (ref 3.4–5)
ALP SERPL-CCNC: 251 U/L (ref 40–150)
ALP SERPL-CCNC: 253 U/L (ref 40–129)
ALT SERPL W P-5'-P-CCNC: 16 U/L (ref 10–50)
ALT SERPL W P-5'-P-CCNC: 20 U/L (ref 0–70)
AMMONIA PLAS-SCNC: 113 UMOL/L (ref 10–50)
ANION GAP SERPL CALCULATED.3IONS-SCNC: 10 MMOL/L (ref 3–14)
ANION GAP SERPL CALCULATED.3IONS-SCNC: 12 MMOL/L (ref 5–18)
ANION GAP SERPL CALCULATED.3IONS-SCNC: 13 MMOL/L (ref 7–15)
ANTIBODY SCREEN: NEGATIVE
APTT PPP: 32 SECONDS (ref 22–38)
AST SERPL W P-5'-P-CCNC: 21 U/L (ref 0–45)
AST SERPL W P-5'-P-CCNC: 22 U/L (ref 10–50)
ATRIAL RATE - MUSE: 84 BPM
BACTERIA BLD CULT: ABNORMAL
BACTERIA BLD CULT: ABNORMAL
BASE EXCESS BLDV CALC-SCNC: 5.2 MMOL/L (ref -8.1–1.9)
BASOPHILS # BLD AUTO: 0 10E3/UL (ref 0–0.2)
BASOPHILS NFR BLD AUTO: 0 %
BILIRUB SERPL-MCNC: 0.6 MG/DL
BILIRUB SERPL-MCNC: 0.9 MG/DL (ref 0.2–1.3)
BLD PROD TYP BPU: NORMAL
BLOOD COMPONENT TYPE: NORMAL
BUN SERPL-MCNC: 108 MG/DL (ref 8–22)
BUN SERPL-MCNC: 114 MG/DL (ref 7–30)
BUN SERPL-MCNC: 96.6 MG/DL (ref 6–20)
CA-I BLD-MCNC: 1.15 MMOL/L (ref 1.11–1.3)
CALCIUM SERPL-MCNC: 8 MG/DL (ref 8.5–10.1)
CALCIUM SERPL-MCNC: 8.1 MG/DL (ref 8.6–10)
CALCIUM SERPL-MCNC: 8.3 MG/DL (ref 8.5–10.5)
CHLORIDE BLD-SCNC: 98 MMOL/L (ref 98–107)
CHLORIDE BLD-SCNC: 99 MMOL/L (ref 94–109)
CHLORIDE SERPL-SCNC: 98 MMOL/L (ref 98–107)
CO2 SERPL-SCNC: 26 MMOL/L (ref 20–32)
CO2 SERPL-SCNC: 26 MMOL/L (ref 22–31)
CODING SYSTEM: NORMAL
CREAT SERPL-MCNC: 3.1 MG/DL (ref 0.67–1.17)
CREAT SERPL-MCNC: 3.35 MG/DL (ref 0.66–1.25)
CREAT SERPL-MCNC: 3.59 MG/DL (ref 0.7–1.3)
CROSSMATCH: NORMAL
DEPRECATED HCO3 PLAS-SCNC: 26 MMOL/L (ref 22–29)
DIASTOLIC BLOOD PRESSURE - MUSE: 54 MMHG
EOSINOPHIL # BLD AUTO: 0 10E3/UL (ref 0–0.7)
EOSINOPHIL NFR BLD AUTO: 0 %
ERYTHROCYTE [DISTWIDTH] IN BLOOD BY AUTOMATED COUNT: 18.4 % (ref 10–15)
ERYTHROCYTE [DISTWIDTH] IN BLOOD BY AUTOMATED COUNT: 18.4 % (ref 10–15)
ERYTHROCYTE [DISTWIDTH] IN BLOOD BY AUTOMATED COUNT: 18.9 % (ref 10–15)
GFR SERPL CREATININE-BSD FRML MDRD: 19 ML/MIN/1.73M2
GFR SERPL CREATININE-BSD FRML MDRD: 20 ML/MIN/1.73M2
GFR SERPL CREATININE-BSD FRML MDRD: 22 ML/MIN/1.73M2
GLUCOSE BLD-MCNC: 104 MG/DL (ref 70–99)
GLUCOSE BLD-MCNC: 149 MG/DL (ref 70–125)
GLUCOSE BLD-MCNC: 179 MG/DL (ref 70–125)
GLUCOSE BLDC GLUCOMTR-MCNC: 122 MG/DL (ref 70–99)
GLUCOSE BLDC GLUCOMTR-MCNC: 135 MG/DL (ref 70–99)
GLUCOSE BLDC GLUCOMTR-MCNC: 152 MG/DL (ref 70–99)
GLUCOSE BLDC GLUCOMTR-MCNC: 177 MG/DL (ref 70–99)
GLUCOSE BLDC GLUCOMTR-MCNC: 183 MG/DL (ref 70–99)
GLUCOSE SERPL-MCNC: 167 MG/DL (ref 70–99)
HCO3 BLDV-SCNC: 28 MMOL/L (ref 24–30)
HCT VFR BLD AUTO: 22.3 % (ref 40–53)
HCT VFR BLD AUTO: 23.1 % (ref 40–53)
HCT VFR BLD AUTO: 23.3 % (ref 40–53)
HCT VFR BLD AUTO: 25.7 % (ref 40–53)
HEMOCCULT STL QL: POSITIVE
HGB BLD-MCNC: 6.6 G/DL (ref 13.3–17.7)
HGB BLD-MCNC: 7 G/DL (ref 13.3–17.7)
HGB BLD-MCNC: 7 G/DL (ref 13.3–17.7)
HGB BLD-MCNC: 7.4 G/DL (ref 13.3–17.7)
HGB BLD-MCNC: 7.9 G/DL (ref 13.3–17.7)
HGB BLD-MCNC: 7.9 G/DL (ref 13.3–17.7)
HIV 1+2 AB+HIV1P24 AG SERPLBLD IA.RAPID: NON REACTIVE
HIV 1+2 AB+HIV1P24 AG SERPLBLD IA.RAPID: NON REACTIVE
HIV INTERPRETATION: NORMAL
IMM GRANULOCYTES # BLD: 0 10E3/UL
IMM GRANULOCYTES NFR BLD: 1 %
INR PPP: 1.14 (ref 0.85–1.15)
INR PPP: 1.58 (ref 0.85–1.15)
INTERPRETATION ECG - MUSE: NORMAL
ISSUE DATE AND TIME: NORMAL
ISSUE DATE AND TIME: NORMAL
LACTATE BLD-SCNC: 1.1 MMOL/L (ref 0.7–2)
LACTATE SERPL-SCNC: 1 MMOL/L (ref 0.7–2)
LACTATE SERPL-SCNC: 1.6 MMOL/L (ref 0.7–2)
LYMPHOCYTES # BLD AUTO: 0.3 10E3/UL (ref 0.8–5.3)
LYMPHOCYTES NFR BLD AUTO: 7 %
MAGNESIUM SERPL-MCNC: 2.2 MG/DL (ref 1.6–2.3)
MCH RBC QN AUTO: 32.1 PG (ref 26.5–33)
MCH RBC QN AUTO: 32.2 PG (ref 26.5–33)
MCH RBC QN AUTO: 32.5 PG (ref 26.5–33)
MCHC RBC AUTO-ENTMCNC: 29.6 G/DL (ref 31.5–36.5)
MCHC RBC AUTO-ENTMCNC: 30 G/DL (ref 31.5–36.5)
MCHC RBC AUTO-ENTMCNC: 30.7 G/DL (ref 31.5–36.5)
MCV RBC AUTO: 106 FL (ref 78–100)
MCV RBC AUTO: 107 FL (ref 78–100)
MCV RBC AUTO: 109 FL (ref 78–100)
MONOCYTES # BLD AUTO: 0.3 10E3/UL (ref 0–1.3)
MONOCYTES NFR BLD AUTO: 6 %
NEUTROPHILS # BLD AUTO: 3.9 10E3/UL (ref 1.6–8.3)
NEUTROPHILS NFR BLD AUTO: 86 %
NRBC # BLD AUTO: 0 10E3/UL
NRBC BLD AUTO-RTO: 0 /100
P AXIS - MUSE: 76 DEGREES
PCO2 BLDV: 50 MM HG (ref 35–50)
PH BLDV: 7.39 [PH] (ref 7.35–7.45)
PHOSPHATE SERPL-MCNC: 4.4 MG/DL (ref 2.5–4.5)
PLATELET # BLD AUTO: 111 10E3/UL (ref 150–450)
PLATELET # BLD AUTO: 83 10E3/UL (ref 150–450)
PLATELET # BLD AUTO: 94 10E3/UL (ref 150–450)
PO2 BLDV: 31 MM HG (ref 25–47)
POTASSIUM BLD-SCNC: 4 MMOL/L (ref 3.4–5.3)
POTASSIUM BLD-SCNC: 4 MMOL/L (ref 3.5–5)
POTASSIUM BLD-SCNC: 4.2 MMOL/L (ref 3.5–5)
POTASSIUM SERPL-SCNC: 4.3 MMOL/L (ref 3.4–5.3)
PR INTERVAL - MUSE: 146 MS
PROT SERPL-MCNC: 4.8 G/DL (ref 6.4–8.3)
PROT SERPL-MCNC: 5.1 G/DL (ref 6.8–8.8)
QRS DURATION - MUSE: 90 MS
QT - MUSE: 382 MS
QTC - MUSE: 451 MS
R AXIS - MUSE: 62 DEGREES
RADIOLOGIST FLAGS: ABNORMAL
RBC # BLD AUTO: 2.05 10E6/UL (ref 4.4–5.9)
RBC # BLD AUTO: 2.18 10E6/UL (ref 4.4–5.9)
RBC # BLD AUTO: 2.43 10E6/UL (ref 4.4–5.9)
SATV LHE POCT: 55 % (ref 70–75)
SODIUM BLD-SCNC: 135 MMOL/L (ref 136–145)
SODIUM SERPL-SCNC: 135 MMOL/L (ref 133–144)
SODIUM SERPL-SCNC: 136 MMOL/L (ref 136–145)
SODIUM SERPL-SCNC: 137 MMOL/L (ref 136–145)
SPECIMEN EXPIRATION DATE: NORMAL
SYSTOLIC BLOOD PRESSURE - MUSE: 98 MMHG
T AXIS - MUSE: 61 DEGREES
UNIT ABO/RH: NORMAL
UNIT NUMBER: NORMAL
UNIT STATUS: NORMAL
UNIT TYPE ISBT: 5100
VENTRICULAR RATE- MUSE: 84 BPM
WBC # BLD AUTO: 3.4 10E3/UL (ref 4–11)
WBC # BLD AUTO: 3.9 10E3/UL (ref 4–11)
WBC # BLD AUTO: 4.5 10E3/UL (ref 4–11)

## 2022-12-16 PROCEDURE — 85610 PROTHROMBIN TIME: CPT | Performed by: INTERNAL MEDICINE

## 2022-12-16 PROCEDURE — 87077 CULTURE AEROBIC IDENTIFY: CPT | Performed by: INTERNAL MEDICINE

## 2022-12-16 PROCEDURE — 258N000003 HC RX IP 258 OP 636: Performed by: STUDENT IN AN ORGANIZED HEALTH CARE EDUCATION/TRAINING PROGRAM

## 2022-12-16 PROCEDURE — 85018 HEMOGLOBIN: CPT | Performed by: INTERNAL MEDICINE

## 2022-12-16 PROCEDURE — 99291 CRITICAL CARE FIRST HOUR: CPT

## 2022-12-16 PROCEDURE — 96374 THER/PROPH/DIAG INJ IV PUSH: CPT

## 2022-12-16 PROCEDURE — 999N000104 HIV RAPID ANTIBODY SCREEN: Performed by: INTERNAL MEDICINE

## 2022-12-16 PROCEDURE — 94640 AIRWAY INHALATION TREATMENT: CPT

## 2022-12-16 PROCEDURE — 36415 COLL VENOUS BLD VENIPUNCTURE: CPT | Performed by: STUDENT IN AN ORGANIZED HEALTH CARE EDUCATION/TRAINING PROGRAM

## 2022-12-16 PROCEDURE — 94640 AIRWAY INHALATION TREATMENT: CPT | Mod: 76

## 2022-12-16 PROCEDURE — 85027 COMPLETE CBC AUTOMATED: CPT | Performed by: HOSPITALIST

## 2022-12-16 PROCEDURE — C9113 INJ PANTOPRAZOLE SODIUM, VIA: HCPCS | Performed by: INTERNAL MEDICINE

## 2022-12-16 PROCEDURE — 94003 VENT MGMT INPAT SUBQ DAY: CPT

## 2022-12-16 PROCEDURE — 999N000253 HC STATISTIC WEANING TRIALS

## 2022-12-16 PROCEDURE — 250N000009 HC RX 250: Performed by: INTERNAL MEDICINE

## 2022-12-16 PROCEDURE — 87205 SMEAR GRAM STAIN: CPT | Performed by: INTERNAL MEDICINE

## 2022-12-16 PROCEDURE — 84155 ASSAY OF PROTEIN SERUM: CPT | Performed by: INTERNAL MEDICINE

## 2022-12-16 PROCEDURE — 86850 RBC ANTIBODY SCREEN: CPT | Performed by: HOSPITALIST

## 2022-12-16 PROCEDURE — 250N000009 HC RX 250: Performed by: HOSPITALIST

## 2022-12-16 PROCEDURE — 999N000065 XR CHEST PORT 1 VIEW

## 2022-12-16 PROCEDURE — 99292 CRITICAL CARE ADDL 30 MIN: CPT

## 2022-12-16 PROCEDURE — 999N000157 HC STATISTIC RCP TIME EA 10 MIN

## 2022-12-16 PROCEDURE — 96375 TX/PRO/DX INJ NEW DRUG ADDON: CPT

## 2022-12-16 PROCEDURE — 85610 PROTHROMBIN TIME: CPT | Performed by: PHYSICIAN ASSISTANT

## 2022-12-16 PROCEDURE — 250N000015 HC RX FACTOR IP MED 636 OP 636: Performed by: STUDENT IN AN ORGANIZED HEALTH CARE EDUCATION/TRAINING PROGRAM

## 2022-12-16 PROCEDURE — 250N000012 HC RX MED GY IP 250 OP 636 PS 637: Performed by: HOSPITALIST

## 2022-12-16 PROCEDURE — 250N000012 HC RX MED GY IP 250 OP 636 PS 637: Performed by: INTERNAL MEDICINE

## 2022-12-16 PROCEDURE — 84100 ASSAY OF PHOSPHORUS: CPT | Performed by: INTERNAL MEDICINE

## 2022-12-16 PROCEDURE — 250N000009 HC RX 250: Performed by: NURSE PRACTITIONER

## 2022-12-16 PROCEDURE — 86923 COMPATIBILITY TEST ELECTRIC: CPT | Performed by: INTERNAL MEDICINE

## 2022-12-16 PROCEDURE — 250N000013 HC RX MED GY IP 250 OP 250 PS 637: Performed by: HOSPITALIST

## 2022-12-16 PROCEDURE — 99223 1ST HOSP IP/OBS HIGH 75: CPT | Performed by: INTERNAL MEDICINE

## 2022-12-16 PROCEDURE — 92523 SPEECH SOUND LANG COMPREHEN: CPT | Mod: GN | Performed by: SPEECH-LANGUAGE PATHOLOGIST

## 2022-12-16 PROCEDURE — 999N000254 HC STATISTIC VENTILATOR TRANSFER

## 2022-12-16 PROCEDURE — 71045 X-RAY EXAM CHEST 1 VIEW: CPT

## 2022-12-16 PROCEDURE — 82272 OCCULT BLD FECES 1-3 TESTS: CPT | Performed by: STUDENT IN AN ORGANIZED HEALTH CARE EDUCATION/TRAINING PROGRAM

## 2022-12-16 PROCEDURE — 80053 COMPREHEN METABOLIC PANEL: CPT | Performed by: HOSPITALIST

## 2022-12-16 PROCEDURE — 84132 ASSAY OF SERUM POTASSIUM: CPT | Performed by: INTERNAL MEDICINE

## 2022-12-16 PROCEDURE — 94002 VENT MGMT INPAT INIT DAY: CPT

## 2022-12-16 PROCEDURE — 85027 COMPLETE CBC AUTOMATED: CPT | Performed by: STUDENT IN AN ORGANIZED HEALTH CARE EDUCATION/TRAINING PROGRAM

## 2022-12-16 PROCEDURE — 84132 ASSAY OF SERUM POTASSIUM: CPT

## 2022-12-16 PROCEDURE — G0463 HOSPITAL OUTPT CLINIC VISIT: HCPCS

## 2022-12-16 PROCEDURE — 85730 THROMBOPLASTIN TIME PARTIAL: CPT | Performed by: PHYSICIAN ASSISTANT

## 2022-12-16 PROCEDURE — 36430 TRANSFUSION BLD/BLD COMPNT: CPT | Mod: 59

## 2022-12-16 PROCEDURE — 83735 ASSAY OF MAGNESIUM: CPT | Performed by: INTERNAL MEDICINE

## 2022-12-16 PROCEDURE — 94799 UNLISTED PULMONARY SVC/PX: CPT

## 2022-12-16 PROCEDURE — 94003 VENT MGMT INPAT SUBQ DAY: CPT | Performed by: NURSE PRACTITIONER

## 2022-12-16 PROCEDURE — 99239 HOSP IP/OBS DSCHRG MGMT >30: CPT | Performed by: HOSPITALIST

## 2022-12-16 PROCEDURE — 250N000011 HC RX IP 250 OP 636: Performed by: STUDENT IN AN ORGANIZED HEALTH CARE EDUCATION/TRAINING PROGRAM

## 2022-12-16 PROCEDURE — P9016 RBC LEUKOCYTES REDUCED: HCPCS | Performed by: INTERNAL MEDICINE

## 2022-12-16 PROCEDURE — 86923 COMPATIBILITY TEST ELECTRIC: CPT | Performed by: PHYSICIAN ASSISTANT

## 2022-12-16 PROCEDURE — 93005 ELECTROCARDIOGRAM TRACING: CPT | Mod: XU | Performed by: STUDENT IN AN ORGANIZED HEALTH CARE EDUCATION/TRAINING PROGRAM

## 2022-12-16 PROCEDURE — 86901 BLOOD TYPING SEROLOGIC RH(D): CPT | Performed by: HOSPITALIST

## 2022-12-16 PROCEDURE — 32551 INSERTION OF CHEST TUBE: CPT

## 2022-12-16 PROCEDURE — 82330 ASSAY OF CALCIUM: CPT

## 2022-12-16 PROCEDURE — 87040 BLOOD CULTURE FOR BACTERIA: CPT | Performed by: STUDENT IN AN ORGANIZED HEALTH CARE EDUCATION/TRAINING PROGRAM

## 2022-12-16 PROCEDURE — 999N000009 HC STATISTIC AIRWAY CARE

## 2022-12-16 PROCEDURE — P9016 RBC LEUKOCYTES REDUCED: HCPCS | Performed by: PHYSICIAN ASSISTANT

## 2022-12-16 PROCEDURE — 83605 ASSAY OF LACTIC ACID: CPT | Performed by: STUDENT IN AN ORGANIZED HEALTH CARE EDUCATION/TRAINING PROGRAM

## 2022-12-16 PROCEDURE — 82803 BLOOD GASES ANY COMBINATION: CPT

## 2022-12-16 PROCEDURE — 99221 1ST HOSP IP/OBS SF/LOW 40: CPT | Performed by: NURSE PRACTITIONER

## 2022-12-16 PROCEDURE — 5A1955Z RESPIRATORY VENTILATION, GREATER THAN 96 CONSECUTIVE HOURS: ICD-10-PCS | Performed by: INTERNAL MEDICINE

## 2022-12-16 PROCEDURE — 250N000013 HC RX MED GY IP 250 OP 250 PS 637: Performed by: INTERNAL MEDICINE

## 2022-12-16 PROCEDURE — 82962 GLUCOSE BLOOD TEST: CPT

## 2022-12-16 PROCEDURE — 250N000011 HC RX IP 250 OP 636: Performed by: HOSPITALIST

## 2022-12-16 PROCEDURE — 83605 ASSAY OF LACTIC ACID: CPT | Performed by: INTERNAL MEDICINE

## 2022-12-16 PROCEDURE — 84450 TRANSFERASE (AST) (SGOT): CPT | Performed by: INTERNAL MEDICINE

## 2022-12-16 PROCEDURE — 82140 ASSAY OF AMMONIA: CPT | Performed by: INTERNAL MEDICINE

## 2022-12-16 PROCEDURE — 84132 ASSAY OF SERUM POTASSIUM: CPT | Performed by: STUDENT IN AN ORGANIZED HEALTH CARE EDUCATION/TRAINING PROGRAM

## 2022-12-16 PROCEDURE — 86923 COMPATIBILITY TEST ELECTRIC: CPT | Performed by: HOSPITALIST

## 2022-12-16 PROCEDURE — 200N000001 HC R&B ICU

## 2022-12-16 PROCEDURE — 250N000011 HC RX IP 250 OP 636: Performed by: INTERNAL MEDICINE

## 2022-12-16 RX ORDER — MINERAL OIL/HYDROPHIL PETROLAT
OINTMENT (GRAM) TOPICAL 2 TIMES DAILY PRN
Status: CANCELLED | OUTPATIENT
Start: 2022-12-16

## 2022-12-16 RX ORDER — IPRATROPIUM BROMIDE AND ALBUTEROL SULFATE 2.5; .5 MG/3ML; MG/3ML
3 SOLUTION RESPIRATORY (INHALATION)
Status: DISCONTINUED | OUTPATIENT
Start: 2022-12-16 | End: 2022-12-27

## 2022-12-16 RX ORDER — GUAIFENESIN 600 MG/1
15 TABLET, EXTENDED RELEASE ORAL DAILY
Status: DISCONTINUED | OUTPATIENT
Start: 2022-12-17 | End: 2022-12-29 | Stop reason: HOSPADM

## 2022-12-16 RX ORDER — GUAIFENESIN 600 MG/1
15 TABLET, EXTENDED RELEASE ORAL DAILY
Status: CANCELLED | OUTPATIENT
Start: 2022-12-17

## 2022-12-16 RX ORDER — NICOTINE POLACRILEX 4 MG
15-30 LOZENGE BUCCAL
Status: CANCELLED | OUTPATIENT
Start: 2022-12-16

## 2022-12-16 RX ORDER — IPRATROPIUM BROMIDE AND ALBUTEROL SULFATE 2.5; .5 MG/3ML; MG/3ML
3 SOLUTION RESPIRATORY (INHALATION)
Status: CANCELLED | OUTPATIENT
Start: 2022-12-16

## 2022-12-16 RX ORDER — ALBUTEROL SULFATE 0.83 MG/ML
2.5 SOLUTION RESPIRATORY (INHALATION)
Status: CANCELLED | OUTPATIENT
Start: 2022-12-16

## 2022-12-16 RX ORDER — NALOXONE HYDROCHLORIDE 0.4 MG/ML
0.4 INJECTION, SOLUTION INTRAMUSCULAR; INTRAVENOUS; SUBCUTANEOUS
Status: DISCONTINUED | OUTPATIENT
Start: 2022-12-16 | End: 2022-12-29 | Stop reason: HOSPADM

## 2022-12-16 RX ORDER — NALOXONE HYDROCHLORIDE 0.4 MG/ML
0.2 INJECTION, SOLUTION INTRAMUSCULAR; INTRAVENOUS; SUBCUTANEOUS
Status: DISCONTINUED | OUTPATIENT
Start: 2022-12-16 | End: 2022-12-29 | Stop reason: HOSPADM

## 2022-12-16 RX ORDER — ONDANSETRON 4 MG/1
4 TABLET, ORALLY DISINTEGRATING ORAL EVERY 6 HOURS PRN
Status: DISCONTINUED | OUTPATIENT
Start: 2022-12-16 | End: 2022-12-29 | Stop reason: HOSPADM

## 2022-12-16 RX ORDER — CARBOXYMETHYLCELLULOSE SODIUM 5 MG/ML
1 SOLUTION/ DROPS OPHTHALMIC 3 TIMES DAILY
Status: DISCONTINUED | OUTPATIENT
Start: 2022-12-16 | End: 2022-12-29 | Stop reason: HOSPADM

## 2022-12-16 RX ORDER — FOLIC ACID 5 MG/ML
1 INJECTION, SOLUTION INTRAMUSCULAR; INTRAVENOUS; SUBCUTANEOUS DAILY
Status: CANCELLED | OUTPATIENT
Start: 2022-12-17

## 2022-12-16 RX ORDER — ALBUMIN (HUMAN) 12.5 G/50ML
50 SOLUTION INTRAVENOUS
Status: DISCONTINUED | OUTPATIENT
Start: 2022-12-16 | End: 2022-12-16 | Stop reason: HOSPADM

## 2022-12-16 RX ORDER — BISACODYL 10 MG
10 SUPPOSITORY, RECTAL RECTAL DAILY PRN
Status: CANCELLED | OUTPATIENT
Start: 2022-12-16

## 2022-12-16 RX ORDER — FOLIC ACID 5 MG/ML
1 INJECTION, SOLUTION INTRAMUSCULAR; INTRAVENOUS; SUBCUTANEOUS DAILY
Status: DISCONTINUED | OUTPATIENT
Start: 2022-12-17 | End: 2022-12-25

## 2022-12-16 RX ORDER — PIPERACILLIN SODIUM, TAZOBACTAM SODIUM 3; .375 G/15ML; G/15ML
3.38 INJECTION, POWDER, LYOPHILIZED, FOR SOLUTION INTRAVENOUS ONCE
Status: COMPLETED | OUTPATIENT
Start: 2022-12-16 | End: 2022-12-16

## 2022-12-16 RX ORDER — NYSTATIN 100000/ML
500000 SUSPENSION, ORAL (FINAL DOSE FORM) ORAL 4 TIMES DAILY
Status: DISCONTINUED | OUTPATIENT
Start: 2022-12-16 | End: 2022-12-29 | Stop reason: HOSPADM

## 2022-12-16 RX ORDER — ACETAMINOPHEN 650 MG/20.3ML
650 LIQUID ORAL EVERY 4 HOURS PRN
Status: CANCELLED | OUTPATIENT
Start: 2022-12-16

## 2022-12-16 RX ORDER — NICOTINE POLACRILEX 4 MG
15-30 LOZENGE BUCCAL
Status: DISCONTINUED | OUTPATIENT
Start: 2022-12-16 | End: 2022-12-29 | Stop reason: HOSPADM

## 2022-12-16 RX ORDER — DEXTROSE MONOHYDRATE 25 G/50ML
25-50 INJECTION, SOLUTION INTRAVENOUS
Status: CANCELLED | OUTPATIENT
Start: 2022-12-16

## 2022-12-16 RX ORDER — ACETAMINOPHEN 325 MG/1
650 TABLET ORAL EVERY 4 HOURS PRN
Status: CANCELLED | OUTPATIENT
Start: 2022-12-16

## 2022-12-16 RX ORDER — LACTULOSE 10 G/15ML
20 SOLUTION ORAL 3 TIMES DAILY
Status: DISCONTINUED | OUTPATIENT
Start: 2022-12-16 | End: 2022-12-29 | Stop reason: HOSPADM

## 2022-12-16 RX ORDER — LACTULOSE 10 G/15ML
20 SOLUTION ORAL 3 TIMES DAILY
Status: CANCELLED | OUTPATIENT
Start: 2022-12-16

## 2022-12-16 RX ORDER — NALOXONE HYDROCHLORIDE 0.4 MG/ML
0.4 INJECTION, SOLUTION INTRAMUSCULAR; INTRAVENOUS; SUBCUTANEOUS
Status: CANCELLED | OUTPATIENT
Start: 2022-12-16

## 2022-12-16 RX ORDER — ONDANSETRON 4 MG/1
4 TABLET, ORALLY DISINTEGRATING ORAL EVERY 6 HOURS PRN
Status: CANCELLED | OUTPATIENT
Start: 2022-12-16

## 2022-12-16 RX ORDER — ALBUTEROL SULFATE 0.83 MG/ML
2.5 SOLUTION RESPIRATORY (INHALATION)
Status: DISCONTINUED | OUTPATIENT
Start: 2022-12-16 | End: 2022-12-29 | Stop reason: HOSPADM

## 2022-12-16 RX ORDER — NICOTINE POLACRILEX 4 MG
15-30 LOZENGE BUCCAL
Status: DISCONTINUED | OUTPATIENT
Start: 2022-12-16 | End: 2022-12-16

## 2022-12-16 RX ORDER — DEXTROSE MONOHYDRATE 25 G/50ML
25-50 INJECTION, SOLUTION INTRAVENOUS
Status: DISCONTINUED | OUTPATIENT
Start: 2022-12-16 | End: 2022-12-29 | Stop reason: HOSPADM

## 2022-12-16 RX ORDER — OXYCODONE HYDROCHLORIDE 5 MG/1
5 TABLET ORAL EVERY 4 HOURS PRN
Status: DISCONTINUED | OUTPATIENT
Start: 2022-12-16 | End: 2022-12-20

## 2022-12-16 RX ORDER — ACETYLCYSTEINE 200 MG/ML
2 SOLUTION ORAL; RESPIRATORY (INHALATION)
Status: CANCELLED | OUTPATIENT
Start: 2022-12-16

## 2022-12-16 RX ORDER — CHLORHEXIDINE GLUCONATE ORAL RINSE 1.2 MG/ML
15 SOLUTION DENTAL EVERY 12 HOURS
Status: DISCONTINUED | OUTPATIENT
Start: 2022-12-16 | End: 2022-12-29 | Stop reason: HOSPADM

## 2022-12-16 RX ORDER — NYSTATIN 100000/ML
500000 SUSPENSION, ORAL (FINAL DOSE FORM) ORAL 4 TIMES DAILY
Status: CANCELLED | OUTPATIENT
Start: 2022-12-16

## 2022-12-16 RX ORDER — DOCUSATE SODIUM 100 MG/1
100 CAPSULE, LIQUID FILLED ORAL 2 TIMES DAILY PRN
Status: CANCELLED | OUTPATIENT
Start: 2022-12-16

## 2022-12-16 RX ORDER — MIDODRINE HYDROCHLORIDE 5 MG/1
10 TABLET ORAL
Status: CANCELLED | OUTPATIENT
Start: 2022-12-16

## 2022-12-16 RX ORDER — DEXTROSE MONOHYDRATE 25 G/50ML
25-50 INJECTION, SOLUTION INTRAVENOUS
Status: DISCONTINUED | OUTPATIENT
Start: 2022-12-16 | End: 2022-12-16

## 2022-12-16 RX ORDER — ALBUMIN (HUMAN) 12.5 G/50ML
50 SOLUTION INTRAVENOUS
Status: CANCELLED | OUTPATIENT
Start: 2022-12-16

## 2022-12-16 RX ORDER — OXYCODONE HYDROCHLORIDE 5 MG/1
5 TABLET ORAL EVERY 4 HOURS PRN
Status: CANCELLED | OUTPATIENT
Start: 2022-12-16

## 2022-12-16 RX ORDER — CARBOXYMETHYLCELLULOSE SODIUM 5 MG/ML
1 SOLUTION/ DROPS OPHTHALMIC 3 TIMES DAILY
Status: CANCELLED | OUTPATIENT
Start: 2022-12-16

## 2022-12-16 RX ORDER — MIDODRINE HYDROCHLORIDE 5 MG/1
10 TABLET ORAL
Status: DISCONTINUED | OUTPATIENT
Start: 2022-12-17 | End: 2022-12-29 | Stop reason: HOSPADM

## 2022-12-16 RX ORDER — NALOXONE HYDROCHLORIDE 0.4 MG/ML
0.2 INJECTION, SOLUTION INTRAMUSCULAR; INTRAVENOUS; SUBCUTANEOUS
Status: CANCELLED | OUTPATIENT
Start: 2022-12-16

## 2022-12-16 RX ADMIN — ACETYLCYSTEINE 2 ML: 200 SOLUTION ORAL; RESPIRATORY (INHALATION) at 11:00

## 2022-12-16 RX ADMIN — FOLIC ACID 1 MG: 5 INJECTION, SOLUTION INTRAMUSCULAR; INTRAVENOUS; SUBCUTANEOUS at 08:19

## 2022-12-16 RX ADMIN — ACETYLCYSTEINE 2 ML: 200 SOLUTION ORAL; RESPIRATORY (INHALATION) at 07:17

## 2022-12-16 RX ADMIN — LACTULOSE 20 G: 20 SOLUTION ORAL at 23:42

## 2022-12-16 RX ADMIN — PROTHROMBIN, COAGULATION FACTOR VII HUMAN, COAGULATION FACTOR IX HUMAN, COAGULATION FACTOR X HUMAN, PROTEIN C, PROTEIN S HUMAN, AND WATER 5000 UNITS: KIT at 18:25

## 2022-12-16 RX ADMIN — PIPERACILLIN AND TAZOBACTAM 3.38 G: 3; .375 INJECTION, POWDER, FOR SOLUTION INTRAVENOUS at 16:37

## 2022-12-16 RX ADMIN — INSULIN GLARGINE 30 UNITS: 100 INJECTION, SOLUTION SUBCUTANEOUS at 06:08

## 2022-12-16 RX ADMIN — MULTIVIT AND MINERALS-FERROUS GLUCONATE 9 MG IRON/15 ML ORAL LIQUID 15 ML: at 08:12

## 2022-12-16 RX ADMIN — NYSTATIN 500000 UNITS: 100000 SUSPENSION ORAL at 23:42

## 2022-12-16 RX ADMIN — IPRATROPIUM BROMIDE AND ALBUTEROL SULFATE 3 ML: 2.5; .5 SOLUTION RESPIRATORY (INHALATION) at 07:17

## 2022-12-16 RX ADMIN — IPRATROPIUM BROMIDE AND ALBUTEROL SULFATE 3 ML: 2.5; .5 SOLUTION RESPIRATORY (INHALATION) at 11:00

## 2022-12-16 RX ADMIN — INSULIN ASPART 1 UNITS: 100 INJECTION, SOLUTION INTRAVENOUS; SUBCUTANEOUS at 06:08

## 2022-12-16 RX ADMIN — HYDROCORTISONE SODIUM SUCCINATE 25 MG: 100 INJECTION, POWDER, FOR SOLUTION INTRAMUSCULAR; INTRAVENOUS at 00:13

## 2022-12-16 RX ADMIN — SODIUM CHLORIDE 1000 ML: 9 INJECTION, SOLUTION INTRAVENOUS at 16:35

## 2022-12-16 RX ADMIN — VANCOMYCIN HYDROCHLORIDE 1750 MG: 5 INJECTION, POWDER, LYOPHILIZED, FOR SOLUTION INTRAVENOUS at 17:53

## 2022-12-16 RX ADMIN — CARBOXYMETHYLCELLULOSE SODIUM 1 DROP: 0.5 SOLUTION/ DROPS OPHTHALMIC at 08:12

## 2022-12-16 RX ADMIN — MIDODRINE HYDROCHLORIDE 10 MG: 5 TABLET ORAL at 11:36

## 2022-12-16 RX ADMIN — TACROLIMUS 1 MG: 5 CAPSULE ORAL at 08:19

## 2022-12-16 RX ADMIN — HYDROCORTISONE SODIUM SUCCINATE 25 MG: 100 INJECTION, POWDER, FOR SOLUTION INTRAMUSCULAR; INTRAVENOUS at 23:42

## 2022-12-16 RX ADMIN — INSULIN ASPART 2 UNITS: 100 INJECTION, SOLUTION INTRAVENOUS; SUBCUTANEOUS at 11:43

## 2022-12-16 RX ADMIN — NYSTATIN 500000 UNITS: 100000 SUSPENSION ORAL at 08:12

## 2022-12-16 RX ADMIN — ACETAMINOPHEN 650 MG: 650 SOLUTION ORAL at 09:34

## 2022-12-16 RX ADMIN — HYDROCORTISONE SODIUM SUCCINATE 25 MG: 100 INJECTION, POWDER, FOR SOLUTION INTRAMUSCULAR; INTRAVENOUS at 06:08

## 2022-12-16 RX ADMIN — PANTOPRAZOLE SODIUM 40 MG: 40 INJECTION, POWDER, FOR SOLUTION INTRAVENOUS at 23:42

## 2022-12-16 RX ADMIN — RIFAXIMIN 550 MG: 550 TABLET ORAL at 08:12

## 2022-12-16 RX ADMIN — TACROLIMUS 1 MG: 5 CAPSULE ORAL at 23:44

## 2022-12-16 RX ADMIN — Medication 1 DROP: at 23:41

## 2022-12-16 RX ADMIN — HYDROCORTISONE SODIUM SUCCINATE 25 MG: 100 INJECTION, POWDER, FOR SOLUTION INTRAMUSCULAR; INTRAVENOUS at 11:32

## 2022-12-16 RX ADMIN — CHLORHEXIDINE GLUCONATE 0.12% ORAL RINSE 15 ML: 1.2 LIQUID ORAL at 23:42

## 2022-12-16 RX ADMIN — LACTULOSE 20 G: 20 SOLUTION ORAL at 08:37

## 2022-12-16 RX ADMIN — MIDODRINE HYDROCHLORIDE 10 MG: 5 TABLET ORAL at 08:11

## 2022-12-16 RX ADMIN — RIFAXIMIN 550 MG: 550 TABLET ORAL at 23:43

## 2022-12-16 RX ADMIN — WHITE PETROLATUM: 1.75 OINTMENT TOPICAL at 08:32

## 2022-12-16 RX ADMIN — Medication 40 MG: at 06:07

## 2022-12-16 ASSESSMENT — ACTIVITIES OF DAILY LIVING (ADL)
ADLS_ACUITY_SCORE: 42
ADLS_ACUITY_SCORE: 40
ADLS_ACUITY_SCORE: 42
ADLS_ACUITY_SCORE: 35
ADLS_ACUITY_SCORE: 35
ADLS_ACUITY_SCORE: 42
ADLS_ACUITY_SCORE: 35
ADLS_ACUITY_SCORE: 40
ADLS_ACUITY_SCORE: 42
ADLS_ACUITY_SCORE: 40
ADLS_ACUITY_SCORE: 40
ADLS_ACUITY_SCORE: 45

## 2022-12-16 ASSESSMENT — ENCOUNTER SYMPTOMS
ABDOMINAL PAIN: 0
BLOOD IN STOOL: 1
BRUISES/BLEEDS EASILY: 1
SHORTNESS OF BREATH: 1

## 2022-12-16 NOTE — PROGRESS NOTES
Thru the day, discussed with pulm HARRY at [place], ED HARRY at [facility]. 800 ml of blood from chest tube placed at [facility]. Working on return to [facility] ICU. Discussed with charge nurse and SOC.    GB

## 2022-12-16 NOTE — PLAN OF CARE
Problem: Plan of Care - These are the overarching goals to be used throughout the patient stay.    Goal: Optimal Comfort and Wellbeing  12/15/2022 2015 by Katie Franco RN  Outcome: Progressing  12/15/2022 2012 by Katie Franco RN  Outcome: Progressing  Intervention: Provide Person-Centered Care  Recent Flowsheet Documentation  Taken 12/15/2022 1800 by Katie Franco, RN  Trust Relationship/Rapport: care explained     Problem: Mechanical Ventilation Invasive  Goal: Effective Communication  Intervention: Ensure Effective Communication  Recent Flowsheet Documentation  Taken 12/15/2022 1800 by Katie Franco RN  Family/Support System Care: support provided  Trust Relationship/Rapport: care explained   Goal Outcome Evaluation:       Pt denied any complaint of pain. Pt repositioned every 2 hours and as needed for comfort.  Pt did have tenderness around nasal and upper lip area and crusting noted from healing fever blister/ cold sores.  Family here with pt at beginning of shift. Will continue to monitor.

## 2022-12-16 NOTE — ED PROVIDER NOTES
Emergency Department Midlevel Supervisory Note     I personally saw the patient and performed a substantive portion of the visit including all aspects of the medical decision making.    ED Course:  4:04 PM  Rocio Lester PA-C staffed patient with me. I agree with their assessment and plan of management, and I will see the patient.  4:06 PM I met with the patient to introduce myself, gather additional history, perform my initial exam, and discuss the plan.   4:46 PM I placed chest tube.    Brief HPI:     Blanco Osborne is a 57 year old male who presents for evaluation of respiratory distress. Patient presents from [facility] where he had decreased oxygen saturations and found to have new left pneumonthorax. Sent to ED for chest tube placement, and plan for return to [place]. Recently admitted to [facility] 11/12-12/15 after cardiac arrest, and treated for acute respiratory failure due to strep pneumonia and parainfluenza, as well as septic shock. He has a trach with vent. Also has a right sided chest tube due to recurrent/persistent pneumothorax.     I, Clari Allen, am serving as a scribe to document services personally performed by Ki Quijano MD, based on my observations and the provider's statements to me.   I, Ki Quijano MD attest that Clari Allen was acting in a scribe capacity, has observed my performance of the services and has documented them in accordance with my direction.    Brief Physical Exam: /55   Pulse 84   Temp 98.1  F (36.7  C) (Oral)   Resp 21   Wt 100.2 kg (221 lb)   SpO2 99%   BMI 29.97 kg/m    Constitutional:  Alert, in no acute distress, though diffusely weak and chronically ill-appearing.  EYES: Conjunctivae clear  HENT:  Atraumatic, normocephalic  Respiratory:   Trach in place, on ventilator.  Pigtail chest tube in right chest attached to Pleur-evac.  Diminished breath sounds on the left.  Cardiovascular:  Regular rate and rhythm, good  peripheral perfusion  GI: Soft, nondistended, nontender  Musculoskeletal:  No edema. No cyanosis. Range of motion major extremities intact.    Integument: Warm, Dry  Neurologic:  Alert, nods yes or no to questions, follows commands on extremities  Psych: Normal mood     MDM:  57-year-old male with extremely complicated past medical history including liver transplant and recent cardiac arrest that eventually landed him at [facility] with a tracheostomy, also has a right chest tube for a pneumothorax.  Patient was transferred out of [place] today for a chest x-ray that showed a left hemopneumothorax.  Patient ended up at [facility] ED for further management and stabilization patient initially fairly hypotensive with pressures in the 80s-90s, though not tachycardic, heart rate in the 80s throughout.  Patient known to have a hemoglobin of 7, was reportedly previously transfused earlier today, though given his persistent hypotension and known hemothorax and given his extreme medical complexity, decision was made to transfuse another 2 units of PRBCs while work-up pending.  Patient does have some blood dried blood noted suggestive of recent bleeding, also has some purplish stools concerning for blood but did ultimately return positive for occult blood.  Chest tube placed in the left chest, immediately had 250 mL of serge blood before it was clamped.  Subsequently returned another 600 mL of blood soon as he was hooked to the Pleur-evac at which point was clamped again, likely representing massive hemothorax.  Patient discussions with staff at [facility], sounds like patient was sent to this ED primarily for the management of the hemopneumothorax.  Now that that has been stabilized, patient will be readmitted to the ICU at [facility] where he was previously admitted.  Sounds like they are familiar with patient, will be transferred via EMS.         Critical Care     Performed by: Dr Ki Quijano  Authorized by:  Dr Ki Quijano  Total critical care time: 60 minutes  Critical care was necessary to treat or prevent imminent or life-threatening deterioration of the following conditions:  Hypotension, massive hemothorax, acute blood loss anemia requiring blood transfusion  Critical care was time spent personally by me on the following activities: development of treatment plan with patient or surrogate, discussions with consultants, examination of patient, evaluation of patient's response to treatment, obtaining history from patient or surrogate, ordering and performing treatments and interventions, ordering and review of laboratory studies, ordering and review of radiographic studies, re-evaluation of patient's condition and monitoring for potential decompensation.  Critical care time was exclusive of separately billable procedures and treating other patients.      1. Hemopneumothorax on left    2. Hypotension, unspecified hypotension type    3. Anemia due to blood loss, acute        Labs and Imaging:  Results for orders placed or performed during the hospital encounter of 12/16/22   XR Chest Port 1 View    Impression    IMPRESSION: There is a new small bore left-sided chest tube present which projects in good position. Improved aeration of left lung with decrease left pleural effusion. However, there remains modest left pneumothorax measuring approximately 1.5 cm in   thickness along left lateral chest in 0.6 cm at left apex, previously the left pneumothorax measured up to 4 cm.    Right-sided chest tube remains in place, no change in the small right pleural effusion. No right pneumothorax. Right lung grossly clear.    Tracheostomy tube and right IJ tunnel dialysis catheter remain in good position. Heart size normal.   Result Value Ref Range    INR 1.58 (H) 0.85 - 1.15   Result Value Ref Range    aPTT 32 22 - 38 Seconds   Basic metabolic panel   Result Value Ref Range    Sodium 136 136 - 145 mmol/L    Potassium 4.2 3.5 -  5.0 mmol/L    Chloride 98 98 - 107 mmol/L    Carbon Dioxide (CO2) 26 22 - 31 mmol/L    Anion Gap 12 5 - 18 mmol/L    Urea Nitrogen 108 (H) 8 - 22 mg/dL    Creatinine 3.59 (H) 0.70 - 1.30 mg/dL    Calcium 8.3 (L) 8.5 - 10.5 mg/dL    Glucose 179 (H) 70 - 125 mg/dL    GFR Estimate 19 (L) >60 mL/min/1.73m2   Lactic acid whole blood   Result Value Ref Range    Lactic Acid 1.6 0.7 - 2.0 mmol/L   CBC with platelets and differential   Result Value Ref Range    WBC Count 4.5 4.0 - 11.0 10e3/uL    RBC Count 2.18 (L) 4.40 - 5.90 10e6/uL    Hemoglobin 7.0 (L) 13.3 - 17.7 g/dL    Hematocrit 23.3 (L) 40.0 - 53.0 %     (H) 78 - 100 fL    MCH 32.1 26.5 - 33.0 pg    MCHC 30.0 (L) 31.5 - 36.5 g/dL    RDW 18.4 (H) 10.0 - 15.0 %    Platelet Count 111 (L) 150 - 450 10e3/uL    % Neutrophils 86 %    % Lymphocytes 7 %    % Monocytes 6 %    % Eosinophils 0 %    % Basophils 0 %    % Immature Granulocytes 1 %    NRBCs per 100 WBC 0 <1 /100    Absolute Neutrophils 3.9 1.6 - 8.3 10e3/uL    Absolute Lymphocytes 0.3 (L) 0.8 - 5.3 10e3/uL    Absolute Monocytes 0.3 0.0 - 1.3 10e3/uL    Absolute Eosinophils 0.0 0.0 - 0.7 10e3/uL    Absolute Basophils 0.0 0.0 - 0.2 10e3/uL    Absolute Immature Granulocytes 0.0 <=0.4 10e3/uL    Absolute NRBCs 0.0 10e3/uL   Occult blood stool   Result Value Ref Range    Occult Blood Positive (A) Negative   Prepare red blood cells (unit)   Result Value Ref Range    Blood Component Type Red Blood Cells     Product Code E2218L66     Unit Status Ready for issue     Unit Number [id number]     CROSSMATCH Compatible     CODING SYSTEM [id number]    Prepare red blood cells (unit)   Result Value Ref Range    Blood Component Type Red Blood Cells     Product Code Z1137Z89     Unit Status Ready for issue     Unit Number [id number]     CROSSMATCH Compatible     CODING SYSTEM [id number]      I have reviewed the relevant laboratory and radiology studies    Procedures:    PROCEDURE: Tube Thoracostomy   TYPE: 9F Cook cath    INDICATIONS: It is medically necessary to place a chest tube for hemo-pneumothorax   PROCEDURE PROVIDER: Dr Ki Quijano   CONSENT: Risks, benefits and alternatives were discussed with and Verbal consent was obtained from Patient.   TIME OUT: Universal protocol was followed. TIME OUT conducted just prior to starting procedure confirmed patient identity, site/side, procedure, patient position, and availability of correct equipment. Yes   SITE: Left side, midaxillary line   MEDICATIONS 10 mLs of 1% Lidocaine with epinephrine    N/A, no sedation meds were given   NOTE: Patient was placed in a semirecumbent position with the head of the bed at 30 degrees.  The left prepped with chlorhexidine. Patient was medicated as above. Incision was made laterally in the midaxilary line.  Blunt dissection up and over the rib was preformed until access was obtained to the pleural cavity.  A 9F cook catheter' Bulgarian chest tube was placed and connected to Pleurovac on continuous suction. Tube was sutured in place with 2-0 silk, and all connections banded.    There was 250 mL of serge blood aspirated, then hooked to suction and returned another 600 mL of additional serge blood mL, bloody appearing fluid.     Post procedure X-ray showing partial reexpansion of the lung.     COMPLICATIONS: Patient tolerated procedure well, without complication     EKG: Sinus rhythm, rate 84.  .  QRS 90.  QTc 451.  No STEMI.    Ki Quijano MD  [facility] EMERGENCY ROOM  [address] [place] [address] [id number] [phone]     Ki Quijano MD  12/17/22 0022

## 2022-12-16 NOTE — ED NOTES
Bed: WWED-01  Expected date:   Expected time:   Means of arrival:   Comments:  Holding for room 8 after CT

## 2022-12-16 NOTE — PHARMACY-VANCOMYCIN DOSING SERVICE
"Pharmacy Vancomycin Initial Note  Date of Service 2022  Patient's  1965  57 year old, male    Indication: Sepsis    Current estimated CrCl = Estimated Creatinine Clearance: 31.8 mL/min (A) (based on SCr of 3.1 mg/dL (H)).    Creatinine for last 3 days  2022:  4:12 AM Creatinine 3.36 mg/dL  12/15/2022:  4:14 AM Creatinine 2.39 mg/dL  2022:  6:26 AM Creatinine 3.10 mg/dL    Recent Vancomycin Level(s) for last 3 days  No results found for requested labs within last 72 hours.      Vancomycin IV Administrations (past 72 hours)      No vancomycin orders with administrations in past 72 hours.                Nephrotoxins and other renal medications (From now, onward)    Start     Dose/Rate Route Frequency Ordered Stop    22 1630  piperacillin-tazobactam (ZOSYN) 3.375 g vial to attach to  mL bag        Note to Pharmacy: For [facility], [facility] and [facility]: For Zosyn-naive patients, use the \"Zosyn initial dose + extended infusion\" order panel.    3.375 g  over 30 Minutes Intravenous ONCE 22 1614      22 1630  vancomycin (VANCOCIN) 1,750 mg in 0.9% NaCl 500 mL intermittent infusion         1,750 mg  over 2 Hours Intravenous ONCE 22 1625            Contrast Orders - past 72 hours (72h ago, onward)    None              Plan:  1. Give vancomycin  1750 mg IV once in the Emergency Department.  2. Please re-consult pharmacy if vancomycin is to continue.     Shanna Garcia [facility]  "

## 2022-12-16 NOTE — PROGRESS NOTES
Pt came into ER on his own vent. Placed on WW LTV vent with home settings. #6 XLT Shihi. Extra shiley in ambu bag on ventilator

## 2022-12-16 NOTE — PROGRESS NOTES
Possible new pneumo thorax to left side as noted by radiologist. Right side looked ok. This writer messaged the MD. The nurse reported some de-sating to 87. but now improved. Currently on trach mask.  Radha Wright RN

## 2022-12-16 NOTE — PLAN OF CARE
Problem: Plan of Care - These are the overarching goals to be used throughout the patient stay.    Goal: Optimal Comfort and Wellbeing  12/15/2022 2026 by Katie Franco, RN  Outcome: Progressing  12/15/2022 2015 by Katie Franco, RN  Outcome: Progressing  12/15/2022 2012 by Katie Franco, RN  Outcome: Progressing  Intervention: Provide Person-Centered Care  Recent Flowsheet Documentation  Taken 12/15/2022 1800 by Katie Franco RN  Trust Relationship/Rapport: care explained   Goal Outcome Evaluation:       Pt denied any complaint of pain.  Repositioned every 2 hours and as needed for comfort.  Pt has healing fever blister/cold sore that is tender on nasal area/upper lip.  Wife here with pt. Pt and wife oriented to unit. Will continue to monitor.

## 2022-12-16 NOTE — PROGRESS NOTES
[facility]  WO Nurse Inpatient Assessment     Consulted for: G-tube and full body skin assessment completed      Patient History (according to provider note(s):      57M cirrhosis, CARRILLO s/p liver txp (9/2016) admitted to ICU 11/12/22 after out of hospital PEA arrest and acute hypoxemic respiratory failure. Course c/b LORETTA requiring CRRT. Found to have Parainfluenza virus and Streptococcal bacteremia.     Areas Assessed:    Full body skin inspection completed. Significant bruising noted over body including close to trach site. Small skin tear on RUE covered by Mepilex.  G-tube: Stage 2 CAPI - present on admission to [place] (patient transferred from [facility] and per WO team there, they were not consulted on this site) - from 8 - 9 o'clock.   Photo taken 12/16/22  0.3 cm x 0.5 cm x 0.2 cm  Wound bed with fibrinous tissue noted to sloughing at this time.  No foul odor noted. Periwound skin intact with light pink coloration noted.    Below items followed while patient was at [facility]. Will continue to follow here as appropriate.  Pressure Injury Location: Philtrum/Columella    Last photo: 12/13/22          Wound history/plan of care:  Wound initial suspected to be pressure as RT had found a tight ETAD and performed appropriate interventions. 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    Wound base: dried adherent scabbing     Palpation of the wound bed: normal      Drainage: none     Description of drainage: none      Measurements (length x width x depth, in cm) decreased size in appearance; much less dried drainage noted     Tunneling N/A     Undermining N/A  Periwound skin: Intact      Color: normal and consistent with surrounding tissue      Temperature: normal   Odor: none  Pain: mild, tender per patient  Pain intervention prior to dressing change: slow and gentle cares   Treatment goal: Heal  and Protection  STATUS: improving  Supplies ordered: discussed with  RN charge and family members    Wound location: Right nose - Healed 12/16/22    Wound location: mid upper lip- healed 12/13    Wound location: Right upper chin- healed 12/13    Roof of mouth: Healed 12/16    Treatment Plan:     Columella/Philtrum/Nosewound(s): Every shift   1. Cleanse site with saline. Pat dry with gauze.  2. Apply acyclovir Q8H.   Avoid vaseline     G-tube - daily  Cleanse wound with saline, gently dry.  Place 1/2 Mepilex over wound.     Orders: Reviewed and Updated    RECOMMEND PRIMARY TEAM ORDER: None, at this time  Education provided: importance of repositioning, plan of care, Moisture management, Hygiene and Off-loading pressure  Discussed plan of care with: Family and Nurse (charge)  WOC nurse follow-up plan: weekly  Notify WOC if wound(s) deteriorate.  Nursing to notify the Provider(s) and re-consult the WOC Nurse if new skin concern.    DATA:     Current support surface: Standard  Foam mattress  Containment of urine/stool: Incontinent pad in bed  BMI: Body mass index is 29.97 kg/m .   Active diet order: None     Output: I/O last 3 completed shifts:  In: 907 [I.V.:30; NG/GT:877]  Out: -      Labs:   Recent Labs   Lab 12/16/22  1129 12/16/22  0932 12/16/22  0626   ALBUMIN  --   --  2.2*   HGB 7.0*   < > 6.6*   WBC  --   --  3.4*    < > = values in this interval not displayed.     Pressure injury risk assessment:   Sensory Perception: 2-->very limited  Moisture: 3-->occasionally moist  Activity: 1-->bedfast  Mobility: 2-->very limited  Nutrition: 3-->adequate  Friction and Shear: 2-->potential problem  Kareem Score: 13    Aby Horvath RN CWOCN

## 2022-12-16 NOTE — CARE PLAN
Pt discharged via EMS to [facility] ED. Report given to EMS. See new orders. Wife updated.

## 2022-12-16 NOTE — PLAN OF CARE
Problem: Mechanical Ventilation Invasive  Goal: Mechanical Ventilation Liberation  Outcome: Not Progressing  Goal: Absence of Ventilator-Induced Lung Injury  Outcome: Not Progressing     Problem: Mechanical Ventilation Invasive  Goal: Effective Communication  Outcome: Progressing  Goal: Optimal Device Function  Outcome: Progressing  Intervention: Optimize Device Care and Function  Recent Flowsheet Documentation  Taken 12/16/2022 1100 by Rocio Giang RT  Airway Safety Measures: all equipment/monitors on and audible  Taken 12/16/2022 0918 by Rocio Giang RT  Airway Safety Measures: all equipment/monitors on and audible  Taken 12/16/2022 0737 by Rocio Giang, RT  Airway Safety Measures: all equipment/monitors on and audible  Taken 12/16/2022 0717 by Rocio Giang RT  Airway Safety Measures: all equipment/monitors on and audible  Goal: Absence of Device-Related Skin and Tissue Injury  Outcome: Progressing   Goal Outcome Evaluation:

## 2022-12-16 NOTE — PROGRESS NOTES
Patient slept well throughout duration of noc shift, denied pain or discomfort, vss, chest tube on and functioning to water seal, no leaking noted or observed, blood glucose level managed, tele yielding NSR, no c/o SOB, alert x 4, no change in LOC, no prn's adm or requested at this time, x-large loose bm this morning, hemoglobin 6.7 this morning, on-call physician informed by charge nurse, will continue to monitor.

## 2022-12-16 NOTE — ED TRIAGE NOTES
The patient presents to the ED via EMS from [facility]. The patient has been a patient there after complications from parainfluenza, Aspergillus pnuemonia and respiratory failure. He has a trach and is on a vent. Currently has a chest tube in place on the right for a pneumothorax. Bloody appearing fluid noted in the drainage chamber. The patient reportedly also has a history of liver transplant, MI approximately 1 month ago and is on dialysis. He was supposed to have a run today but was unable to due to the issues that brought him to the ED today. The patient reportedly was trialing a trach dome around 0830 this morning but was having decreased oxygen saturation. He had some imaging studies done and was found to have a pneumothorax on the left as reported by EMS. The patient is on blood thinners. He denies pain.

## 2022-12-16 NOTE — PROGRESS NOTES
Addendum 1320: spoke with IR multiple times and unable to accommodate patient in a timely manner due to his vent needs so will plan to send to  ED for urgent chest tube placement.  Wife Ofe updated.     Pulmonary Progress Note    Admit Date: 12/15/2022  CODE: Full Code    Assessment/Plan:   57-year-old man with a history of liver transplant admitted 11/12 with respiratory failure secondary to pneumonia complicated by cardiac arrest and prolonged resuscitation s/p trach and peg.       Acute hypoxic/hypercapnic respiratory failure.  Multifactorial related to pneumonia, ARDS, possible underlying COPD, PTX, pleural effusions.  Failed extubation x2 s/p trach.      Recurrent right sided pneumothorax: Chest tube in place w/ small amt bloody output; on water seal      Tracheostomy: 8 Shiley placed 11/29.  Changed to 6 Shiley prox XLT 12/8 per note review     B/l pleural effusions s/p L thora 11/26(1.5L), 12/6(200mL removed)     Dialysis dependent renal failure.  Following with nephrology.     Infectious disease: Complex picture with strep, parainfluenza, Aspergillus of unclear significance.  Completed 15 days of Rocephin and 1 week course of Voriconazole     Liver transplant ([facility]) 2016: tacro and steroids.  Mgmt per tx service and [facility]  Ascites s/p paracentesis 12/1: cytology neg malignancy      CHRISTIAN on BIPAP when not on vent      Dysphagia s/p PEG with enteral feeds      Atrial fibrillation, likely paroxysmal and with concern of prior CVA's on apixaban      Pancytopenia due to chronic illness and immunosuppressive meds     Severely deconditioned     New left pneumothorax seen on routine imaging today: noted desaturation and more dyspnea this morning.  Last BP check stable, not tachycardic     Acute on chronic anemia: Hgb low this morning, possible GIB with blood in stool     Plan:  - needs chest tube placement for new left PTX.  Hospitalist also concerned about new GIB and may need to be seen by GI.  Repeat H&H and if  stable should be ok to send patient to [facility] for a pigtail cath and avoid a transfer back to [facility].    - Stable on current vent settings, transport on vent.  Titrate FiO2 to goal SpO2 ~100% for now  - Stop metanebs given untreated PTX and I would be hesitant to restart these until his PTXs are resolved and chest tubes are out given recurrent issues       Henry Fenton, CNP  Pulmonary Medicine  [facility]  Pager [phone]    Subjective/Interim Events:   - appears to be in no acute distress but is SOB on full vent, this is a clear change from when I saw him last evening     Clinical status discussed today with respiratory therapist    Medications:       - MEDICATION INSTRUCTIONS -         acetylcysteine  2 mL Nebulization 4x daily     [Held by provider] apixaban ANTICOAGULANT  5 mg Oral or Feeding Tube BID     carboxymethylcellulose PF  1 drop Both Eyes TID     folic acid  1 mg Intravenous Daily     hydrocortisone sodium succinate PF  25 mg Intravenous Q6H     insulin aspart  1-12 Units Subcutaneous Q6H     insulin glargine  30 Units Subcutaneous QAM AC     ipratropium - albuterol 0.5 mg/2.5 mg/3 mL  3 mL Nebulization 4x daily     lactulose  20 g Oral or Feeding Tube TID     midodrine  10 mg Oral or Feeding Tube TID w/meals     multivitamins w/minerals  15 mL Per Feeding Tube Daily     nystatin  500,000 Units Swish & Swallow 4x Daily     pantoprazole  40 mg Per Feeding Tube QAM AC     rifaximin  550 mg Per Feeding Tube BID     sodium chloride (PF)  10-40 mL Intracatheter Q8H     tacrolimus  1 mg Oral or Feeding Tube BID IS         Exam/Data:   Vitals  BP 91/61 (BP Location: Left arm, Patient Position: Semi-Salgado's)   Pulse 92   Temp 97.7  F (36.5  C) (Oral)   Resp 21   Wt 100.2 kg (221 lb)   SpO2 95%   BMI 29.97 kg/m    BP - Mean:  [64-91] 91  I/O last 3 completed shifts:  In: 907 [I.V.:30; NG/GT:877]  Out: -   Weight change:     Vent Mode: CMV/AC  (Continuous Mandatory Ventilation/ Assist  Control)  FiO2 (%): 30 %  Resp Rate (Set): 16 breaths/min  Tidal Volume (Set, mL): 450 mL  PEEP (cm H2O): 5 cmH2O  Resp: 21      EXAM:  Gen: NAD on vent  HEENT: NT, trach midline/intact  CV: RRR, no m/g/r  Resp: coarse bilaterally with decreased to absent movement on left side  Abd: large, nontender  Skin: no visible rashes or lesions  Ext: mild edema  Neuro: opens eyes to voice, follows simple commands     ROS:  A 10-system review was obtained and is negative with the exception of the symptoms noted above.    Labs:  Arterial Blood Gases   No lab results found in last 7 days.  Complete Blood Count   Recent Labs   Lab 12/16/22  0932 12/16/22  0626 12/15/22  0414 12/14/22  0412 12/13/22  0427   WBC  --  3.4* 3.7* 2.8* 4.6   HGB 7.4* 6.6* 7.3* 7.0* 6.5*   PLT  --  83* 78* 58* 63*     Basic Metabolic Panel  Recent Labs   Lab 12/16/22  0932 12/16/22  0626 12/16/22  0547 12/16/22  0019 12/15/22  0809 12/15/22  0414 12/14/22  0944 12/14/22 0412 12/13/22  0748 12/13/22 0427   * 137  --   --   --  135  --  136  --  136   POTASSIUM 4.0 4.3  --   --   --  4.0  --  4.1  --  3.8   CHLORIDE  --  98  --   --   --  99  --  99  --  101   CO2  --  26  --   --   --  30  --  28  --  29   BUN  --  96.6*  --   --   --  72*  --  96*  --  72*   CR  --  3.10*  --   --   --  2.39*  --  3.36*  --  2.55*   * 167* 152* 135*   < > 136*   < > 196*   < > 222*  210*    < > = values in this interval not displayed.     Liver Function Tests  Recent Labs   Lab 12/16/22  0626 12/15/22  0414 12/13/22 0427 12/12/22  0431   AST 22  --   --  21   ALT 16  --   --  15   ALKPHOS 253*  --   --  241*   BILITOTAL 0.6  --   --  0.8   ALBUMIN 2.2* 1.8* 1.7* 1.6*     RADIOLOGY: Personally reviewed; radiology read below   XR Chest Port 1 View    Result Date: 12/16/2022  EXAM: XR CHEST PORT 1 VIEW LOCATION: [facility] DATE/TIME: 12/16/2022 8:49 AM INDICATION: assess PTX COMPARISON: Portable chest radiography 12/13/2022      IMPRESSION: Similar size of moderate left pleural effusion. There is a new left pneumothorax visible in the lateral left apex and associated angular opacity related to the left lung towards the hilum consistent with related atelectasis. Pleural drain in the right lateral chest is similar in position. No visible right pneumothorax. Opacity in the mid and lower right chest relates to layering pleural fluid and related atelectasis. Right PICC extends to the middle third of the SVC and tunneled right jugular dialysis catheter terminates in the right atrium. Tracheostomy resides within the tracheal air column. Left ventricular heart border is increasingly obscured by pleural fluid and adjacent lung opacity. Right atrial heart border is normal. [Critical Result: New left pneumothorax] Finding was identified on 12/16/2022 8:50 AM. Patchy, the charge nurse at [facility] was contacted by me on 12/16/2022 8:59 AM and verbalized understanding of the critical result.      '

## 2022-12-16 NOTE — PROGRESS NOTES
"Speech pathology: Speech/language evaluation     12/16/22 7821   Appointment Info   Signing Clinician's Name / Credentials (SLP) Matti Santos MA, CCC-SLP   General Information   Onset of Illness/Injury or Date of Surgery 11/22/22   Referring Physician Narinder   Pertinent History of Current Problem Per H&P: \"Blanco Osborne is a 57 year old male who is known to have paroxysmal A. fib, suspected COPD and hypertension.  He was admitted to an outside acute care hospital 11/22 for acute respiratory failure secondary to pneumonia.  His stay was complicated with cardiac arrest with prolonged resuscitation.  He was intubated and put on mechanical ventilation.  Failed extubation x2 .  Noted to be in ARDS now status post tracheostomy.  Noted to be in septic shock with multiorgan failure.  He requires intermittent hemodialysis for acute renal failure. He stay at outside hospital was complicated with recurrent right-sided pneumothorax.  He now has chest tube in place on waterseal.  He had strep pneumonia, parainfluenza and Aspergillus.  Infectious disease consulted and completed 15-day course of ceftriaxone and 1 week course of voriconazole.  Noted to have ascites status post paracentesis 12/1.  Cytology negative for malignancy.  He is now on tube feedings for dysphagia and is physically deconditioned.\"   General Observations Awake, somewhat slow to respond.   Type of Evaluation   Type of Evaluation Speech, Language, Cognition   Tracheostomy Assessment (Speaking Valve)   Type, Tracheostomy Tube Shiley   Tube Size, Tracheostomy 6 XLT   Cuff, Tracheostomy Tube cuffed, inflated   Date of Tracheostomy 11/29/22   Respiratory Status (Speaking Valve)   Oxygen Supply Ventilator   Ventilator Mode AC   Ventilator PEEP 5   Ventilator RR 16   Ventilator Vt 450   Ventilator FIO2 30   Oral Motor   Oral Musculature generally intact;unable to assess due to poor participation/comprehension  (Did not consistently follow oral motor " directions)   Structural Abnormalities none present   Mucosal Quality adequate   Dentition (Oral Motor)   Dentition (Oral Motor) natural dentition   Facial Symmetry (Oral Motor)   Facial Symmetry (Oral Motor) WNL   Lip Function (Oral Motor)   Lip Range of Motion (Oral Motor) WNL   Comment, Lip Function (Oral Motor) Did not consistently follow oral motor directions to eval to eval strength   Tongue Function (Oral Motor)   Tongue ROM (Oral Motor) WNL   Comment, Tongue Function (Oral Motor) Did not consistently follow oral motor directions to eval to eval strength   Cough/Swallow/Gag Reflex (Oral Motor)   Volitional Swallow (Oral Motor) unable/difficult to assess  (Did not follow instruction)   Vocal Quality/Secretion Management (Oral Motor)   Comment, Vocal Quality/Secretion Management (Oral Motor) Aphonic due to trach   General Swallowing Observations   Current Diet/Method of Nutritional Intake (General Swallowing Observations, NIS) NPO   Motor Speech   Speech Intelligibility (Motor Speech) word level   Word Level, Speech Intelligibility (Motor Speech) impaired   Auditory Comprehension   Follows Commands (Auditory Comprehension) 1-step command   Yes/No Questions (Auditory Comprehension) biographical/personal questions   Biographical/Personal Questions (Auditory Comprehension) intact   1 Step, Follows Commands (Auditory Comprehension) impaired;50-74% accuracy   Verbal Expression   Confrontational Naming (Verbal Expression) objects   Objects, Confrontational Naming (Verbal Expression) impaired  (40% accurate/intelligible)   Cognition   Affect/Mental Status (Cognition) confused   General Therapy Interventions   Planned Therapy Interventions Communication;Language   Language Verbal expression;Auditory comprehension   Communication Improve speech intelligibility;Electrolarynx instruction   Clinical Impression   Criteria for Skilled Therapeutic Interventions Met (SLP Eval) Yes, treatment indicated   SLP Diagnosis 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  Suspect impaired cognitive linguistic skills   Risks & Benefits of therapy have been explained evaluation/treatment results reviewed;care plan/treatment goals reviewed;participants voiced agreement with care plan;participants included;patient   Clinical Impression Comments Abbreviated evaluation as patient due to transport to out of building appointment.  Presented with aphonia due to trach.  Cognitive linguistic deficits including decreased attention, problem-solving, and insight appeared to compromise communication in simple conversation.  Did not consistently follow directions or answer questions and did not appear to be correctly mouthing information.   SLP Total Evaluation Time   Eval: Sound production with lang comprehension and expression Minutes ([id number]) 33

## 2022-12-16 NOTE — PROGRESS NOTES
Pt resting quietly in bed when writer came onto shift. Alert, able to nod yes and no. Unable to fully assess orientation status due to be trached. Pt is very weak, especially the left side. Soft touch call light requested from engineering by [facility]. On ventilator, appears to be tolerating it well. When asked if had pain, nodded yes, unable to state where. Pt trying to talk but this writer unable to read his lips. Pt unable to gesture where the pain is. Stated pain was 5/10. PRN oxycodone given at 2101, upon reassessment, pt resting quietly with eyes closed. Chest tube to water seal, noted 6 ml of bloody drainage in container. Container marked at the 6 ml line and dated 12/15. Clamp in plastic bag taped on wall near doorway. Anuric. No BM this shift. Full skin assessment not completed, however pt does have dried black crusty blisters under nose, note left to WOC by previous shift in regards to this. Continues on telemetry. Pt on tube feeding, tube feeding formula running at wrong rate when writer started shift, tube feeding changed to correct rate and blood sugar checked - 111. Pt had high gastric residual - 235 ml. No flush orders for tube feeding - pt has yet to have nutrition consult. G-tube flushed with medications. At this writing, pt resting quietly in bed with eyes closed, appears comfortable.

## 2022-12-16 NOTE — PROGRESS NOTES
This Writer spoke with RUDY inquiring about patient bleeding, RUDY informed this Writer that patient had blood in stool, this Writer paged primary, who returned call to unit with new orders given to day nurse, will continue to monitor.

## 2022-12-16 NOTE — ED TRIAGE NOTES
The patient presents to the ED via EMS from [facility]. The patient has been a patient there after complications from parainfluenza, Aspergillus pnuemonia and respiratory failure. He has a trach and is on a vent. Currently has a chest tube in place on the right for a pneumothorax. Bloody appearing fluid noted in the drainage chamber. The patient reportedly also has a history of liver transplant, MI approximately 1 month ago and is on dialysis. He was supposed to have a run today but was unable to due to the issues that brought him to the ED today. The patient reportedly was trialing a trach dome around 0830 this morning but was having decreased oxygen saturation. He had some imaging studies done and was found to have a pneumothorax on the left as reported by EMS. The patient is on blood thinners. He denies pain.

## 2022-12-16 NOTE — PROGRESS NOTES
RT PROGRESS NOTE     DATA:     CURRENT SETTINGS:             TRACH TYPE / SIZE:  #6 Shiley XLT (placed 11/29)             MODE:   AC 16, 450, +5             FIO2:   40%     ACTION:             THERAPIES:   Duoneb/Mucomyst QID             SUCTION:                           FREQUENCY:   x2                        AMOUNT:   Moderate to Large                        CONSISTENCY:   Thick                        COLOR:   Pale Yellow             SPONTANEOUS COUGH EFFORT/STRENGTH OF EFFORT (not elicited by suctioning): Moderate Spontaneous Cough                           WEANING PHASE:   Wean Hold                        WEAN MODE:    TM (cuff up)                        WEAN TIME:   1hr and 41 minutes                        END WEAN REASON:   Accessory Muscle Use     RESPONSE:             BS:   Coarse/Diminished             VITAL SIGNS:   Sating %, HR , RR 20-29             EMOTIONAL NEEDS / CONCERNS:  NA                RISK FOR SELF DECANNULATION:  No       NOTE / PLAN:   New left PTX, pt being sent to [facility] for chest tube.  RT will continue to monitor.

## 2022-12-16 NOTE — CONSULTS
CLINICAL NUTRITION SERVICES - ASSESSMENT NOTE     Nutrition Prescription    RECOMMENDATIONS FOR MDs/PROVIDERS TO ORDER:  Adjust flushes PRN    Malnutrition :  % Intake: Decreased intake does not meet criteria on enteral starting 11/14/22 advanced then decreased then advanced again to goal 11/22/22  % Weight Loss: > 5% in 1 month (severe)  Subcutaneous Fat Loss: Facial region:  moderate  Muscle Loss: Temporal:  moderate and Thoracic region (clavicle, acromium bone, deltoid, trapezius, pectoral):  moderate  Fluid Accumulation/Edema: Mild per Renal  Malnutrition Diagnosis: Severe malnutrition        Malnutrition Diagnosis: Severe malnutrition  In Context of:  Acute illness or injury on Chronic illness or disease      Recommendations already ordered by Registered Dietitian (RD):  Ordered water flushes 30 ml q 4 h  Continue Current Nutrition Rx    Future/Additional Recommendations:       REASON FOR ASSESSMENT  Blanco Osborne is a/an 57 year old male assessed by the dietitian for Provider Order - Registered Dietitian to Assess and Order TF per Medical Nutrition Therapy Protocol  History of liver transplant([facility] 2016), CHRISTIAN,hospitalized 11/12/22 w/ respiratory failure d/t  Pneumonia,s/p cardiac arrest w/ prolonged resuscitation s/p trach and PEG, ARF on HD  Admitted for ongoing medical management    NUTRITION HISTORY      Food allergies/intolerances: NKFA     Cultural needs or preferences none noted    Per previous hospital admission notes, pt was dieting the past year PTA in effort to lose wt, was incorporating protein bars and shakes(ensure Max)  Factors affecting nutrition intake include:swallowing difficulties   Novasource Renal at 60 mL/hr continuous,Free Water Flush: 30 mL every 4 hours     CURRENT NUTRITION ORDERS  Diet: no diet order ( likely NPO)    Enteral: via GT placed 11/29/22:  CONTINUOUS, Starting on Thu 12/15/22 at 1545, Until Specified  Adult Formula: Novasource Renal  Route: Gastrostomy  Goal Rate:  60  Goal Units: mL/hr  Medication - Feeding Tube Flush Frequency: At least 15-30 mL water before and after medication administration and with tube clogging  No flushes ordered  = 2880 kcal , 131 g protein , 264 g CHO, 0 g fiber, 1032 mL H2O        LABS: reviewed including:  Potassium (mmol/L)   Date Value   12/16/2022 4.3   12/15/2022 4.0   12/14/2022 4.1   12/13/2022 3.8   04/20/2020 3.9   10/07/2019 4.3   02/20/2019 4.2     Potassium POCT (mmol/L)   Date Value   12/16/2022 4.0     Phosphorus (mg/dL)   Date Value   12/15/2022 4.0   12/13/2022 4.1   12/12/2022 5.0 (H)   12/08/2022 3.3   12/07/2022 2.2 (L)   10/17/2017 3.4   09/19/2017 3.5   07/18/2017 3.3   02/03/2017 4.0   12/26/2016 4.2    Blood Glucose  Glucose (mg/dL)   Date Value   12/16/2022 167 (H)   12/15/2022 136 (H)   12/14/2022 196 (H)   12/13/2022 222 (H)   12/12/2022 188 (H)   12/10/2022 166 (H)   04/20/2020 93   10/07/2019 102 (H)   02/20/2019 95   07/10/2018 89   10/31/2017 97     GLUCOSE BY METER POCT (mg/dL)   Date Value   12/16/2022 152 (H)   12/16/2022 135 (H)   12/15/2022 111 (H)   12/15/2022 78   12/15/2022 104 (H)     Glucose Whole Blood POCT (mg/dL)   Date Value   12/16/2022 149 (H)     Hemoglobin A1C (%)   Date Value   11/19/2022 4.7   09/22/2016 4.8    Inflammatory Markers  CRP Inflammation (mg/L)   Date Value   11/22/2022 22.3 (H)     WBC (10e9/L)   Date Value   04/20/2020 4.0   10/07/2019 7.7   02/20/2019 3.9 (L)     WBC Count (10e3/uL)   Date Value   12/16/2022 3.4 (L)   12/15/2022 3.7 (L)   12/14/2022 2.8 (L)     Albumin (g/dL)   Date Value   12/16/2022 2.2 (L)   12/15/2022 1.8 (L)   12/13/2022 1.7 (L)   12/12/2022 1.6 (L)   04/20/2020 3.9   10/07/2019 3.8   02/20/2019 3.8     Magnesium (mg/dL)   Date Value   12/12/2022 2.1   11/29/2022 2.3   11/27/2022 2.1   10/17/2017 1.5 (L)   09/19/2017 1.9   07/18/2017 1.7     Sodium (mmol/L)   Date Value   12/16/2022 137   12/15/2022 135   12/14/2022 136   04/20/2020 139   10/07/2019 131 (L)    02/20/2019 137     Sodium POCT (mmol/L)   Date Value   12/16/2022 135 (L)    Renal  Urea Nitrogen (mg/dL)   Date Value   12/16/2022 96.6 (H)   12/15/2022 72 (H)   12/14/2022 96 (H)   12/13/2022 72 (H)   04/20/2020 23   10/07/2019 18   02/20/2019 20     Creatinine (mg/dL)   Date Value   12/16/2022 3.10 (H)   12/15/2022 2.39 (H)   12/14/2022 3.36 (H)   04/20/2020 1.06   10/07/2019 1.01   02/20/2019 1.08     Additional  Triglycerides (mg/dL)   Date Value   11/20/2022 94   04/20/2020 192 (H)   10/31/2017 92   03/01/2016 82     Triglycerides (External) (mg/dL)   Date Value   08/01/2019 177 (H)   01/08/2019 135     Ketones Urine (mg/dL)   Date Value   11/25/2022 Negative   10/31/2017 5 (A)       MEDICATIONS    Medications reviewed    acetylcysteine  2 mL Nebulization 4x daily     [Held by provider] apixaban ANTICOAGULANT  5 mg Oral or Feeding Tube BID     carboxymethylcellulose PF  1 drop Both Eyes TID     epoetin mirta-epbx  10,000 Units Subcutaneous Once per day on Mon Wed Fri     folic acid  1 mg Intravenous Daily     hydrocortisone sodium succinate PF  25 mg Intravenous Q6H     insulin aspart  1-12 Units Subcutaneous Q6H     insulin glargine  30 Units Subcutaneous QAM AC     ipratropium - albuterol 0.5 mg/2.5 mg/3 mL  3 mL Nebulization 4x daily     lactulose  20 g Oral or Feeding Tube TID     midodrine  10 mg Oral or Feeding Tube TID w/meals     multivitamins w/minerals  15 mL Per Feeding Tube Daily     nystatin  500,000 Units Swish & Swallow 4x Daily     pantoprazole  40 mg Per Feeding Tube QAM AC     rifaximin  550 mg Per Feeding Tube BID     sodium chloride (PF)  10-40 mL Intracatheter Q8H     tacrolimus  1 mg Oral or Feeding Tube BID IS          - MEDICATION INSTRUCTIONS -                ANTHROPOMETRICS  Height: 6'  Weight: 100 kg   Body mass index is 29.97 kg/m .  Weight Status:  Overweight BMI 25-29.9  124 % IBW  Weight History:   Wt Readings from Last 10 Encounters:   12/16/22 100.2 kg (221 lb)   12/15/22 87  kg (191 lb 12.8 oz)   10/07/19 137.5 kg (303 lb 3.2 oz)   10/04/19 131.5 kg (290 lb)   02/20/19 140.4 kg (309 lb 9.6 oz)   07/10/18 137.5 kg (303 lb 3.2 oz)   10/31/17 133.8 kg (295 lb)   10/17/17 (!) 136.7 kg (301 lb 4.8 oz)   09/19/17 133.4 kg (294 lb)   07/18/17 129.7 kg (286 lb)       11/14/22 107 kg (235 lb 14.3 oz) previous admission wt     Per Care Everywhere --   130.2 kg (287 lb) 05/07/2021     6 % weight loss in 1 month, as noted above,  clinically significant  Unable to assess further weight changes in the last year d/t limited data    Dosing Weight: 85 kg  adjusted   ASSESSED NUTRITION NEEDS  Estimated Energy Needs: 2300+ kcals/day ( 30 + kcals/kg)  Justification: repletion  Estimated Protein Needs: 130+ grams protein/day ( 1.5 + grams of pro/kg)  Justification: Repletion/ Dialysis  Estimated Fluid Needs:  (U.O + 1000 ml) or   Justification: Per provider pending fluid status    PHYSICAL FINDINGS  See malnutrition section above.  Wound care 12/13/22 - PI on philtrum/columella - healing; herpetic lesion R nose - healing; herpetic lesions on lip and chin - healed    PTA 11/29/22:  Paracentesis = 3.3 Liters removed  Kareem score :Nutrition 3 Total Score 13    GI Status/Output:  Last BM:12/15/22  per nursing D hypo BS   Remains on lactulose, RT removed PTA 12/9/22  I/O last 3 completed shifts:  In: 907 [I.V.:30; NG/GT:877]  Out: -     NUTRITION DIAGNOSIS  Malnutrition related to illness in the setting of inflammation as evidenced by s/s significant weight loss since hospitalization,labs    Impaired nutrient utilization  AEB BG    INTERVENTIONS  Implementation     Enteral Nutrition - Modify -   Enteral at goal provides: the same stated above w/  1032 ml free water from formula + 180 ml from flushes = 1212 ml/d    Goals  Maintain Weight   BG   Achieve/maintain fluid and electrolyte balance  Tolerate enteral feeding  Normalize GI function as able  Advance to oral intake as able  Wound  healing    Monitoring/Evaluation  Progress toward goals will be monitored and evaluated per protocol.

## 2022-12-16 NOTE — PLAN OF CARE
Problem: Mechanical Ventilation Invasive  Goal: Effective Communication  Outcome: Progressing  Goal: Absence of Ventilator-Induced Lung Injury  Outcome: Progressing         RT PROGRESS NOTE     DATA:     CURRENT SETTINGS: AC 16/450/+5             TRACH TYPE / SIZE: #6 Shihi XLT pro placed on 11/29/22             MODE:  AC             FIO2:  30%     ACTION:             THERAPIES:   DUO NEB QID/MUCOMYST QID/Volara QID             SUCTION:                           FREQUENCY:   X3                        AMOUNT:   Small to moderate                        CONSISTENCY:  Thick/thin                        COLOR:   Pale/yellow             SPONTANEOUS COUGH EFFORT/STRENGTH OF EFFORT (not elicited by suctioning): Fair                              WEANING PHASE:  # 2                        WEAN MODE:  TM with cuff up days and vent at night                        WEAN TIME:                           END WEAN REASON:       RESPONSE:             BS:  Coarse             VITAL SIGNS:   SAT 96-98%, HR , RR 16-25             EMOTIONAL NEEDS / CONCERNS: N/A               RISK FOR SELF DECANNULATION:  N/A                        RISK DUE TO:                          INTERVENTION/S IN PLACE IS/ARE: N/A     NOTE / PLAN:   Pt was admit today around 14:46 pm and is on full vent support, ashley well. New order phase #2  Days with cuff up and full vent support at night and keep sat >/=90-92%

## 2022-12-16 NOTE — ED PROVIDER NOTES
EMERGENCY DEPARTMENT ENCOUNTER      NAME: Blanco Osborne  AGE: 57 year old male  YOB: 1965  MRN: [id number]  EVALUATION DATE & TIME: 12/16/2022  3:04 PM    PCP: Ki Powers    ED PROVIDER: Rocio Lester PA-C      Chief Complaint   Patient presents with     Respiratory Distress         FINAL IMPRESSION:  1. Hemopneumothorax on left    2. Hypotension, unspecified hypotension type    3. Anemia due to blood loss, acute          MEDICAL DECISION MAKING:    Pertinent Labs & Imaging studies reviewed. (See chart for details)  57 year old male with a pertinent history of cardiac arrest, acute respiratory failure trach and vent dependent, PTX with right sided chest tube, aspergillus pneumonia, acute renal failure on dialysis, s/p liver transplant 2016 presents to the Emergency Department from [place] for chest tube placement. Patient sent to [place] [facility] yesterday after long stay at [facility] for stated issues above. This morning more short of breath and hypoxic, found to have an additional PTX on the left. Apparently care team at [place] had spoke to IR with plans to come here for chest tube placement and return back to [place].     Shortly after arrival to ED patient became hypotensive. Noted to have blood from nose, right sided chest tube, and fecal occult positive stool. Hemoglobin 7.0. 2 units of RBCs ordered and kcentra ordered for reversal of eliquis. It is unclear when he last got eliquis. IR does not have team here at  to place chest tube and hesitant to do so with ongoing bleeding. Spoke with tele intensivist. Recommends pigtail catheter placement. Dr. Quijano placed catheter with return of 850 ml of blood. CXR showing improvement with small amount of residual PTX remaining. Blood pressure did improve though patient felt to be too sick to transfer back to [place] and needs ICU. We were able to get him an ICU bed back at [facility] where he was previously hospitalized. Patient felt to be  stable for transport at the time EMS arrived.     Critical Care  Total critical care time: 120 minutes  Critical care time was exclusive of separately billable procedures and treating other patients.  Critical care was necessary to treat or prevent imminent or life-threatening deterioration of the following conditions: hemopneumothorax, respiratory failure, acute blood loss anemia  Critical care was time spent personally by me on the following activities: development of treatment plan with patient or surrogate, discussions with consultants, examination of patient, evaluation of patient's response to treatment, obtaining history from patient or surrogate, ordering and performing treatments and interventions, ordering and review of laboratory studies, ordering and review of radiographic studies and re-evaluation of patient's condition, this excludes any separately billable procedures.      ED COURSE:  3:05 PM I met with the patient, obtained history, performed an initial exam, and discussed options and plan for diagnostics and treatment here in the ED.  3:50 PM Spoke with Dr. Blanco Benjamin with IR. Unable to do IR chest tube placement at [facility] as he has no team here.  4:01 PM Staffed with Dr. Quijano  4:20 PM Spoke with resident at [facility] Dr. Barcenas.   4:30 PM Spoke with pulmonology NP Henry at [facility]  4:40 PM Spoke with tele-hospitalist Dr. Hernandez who was taking care of patient at [facility].  5:20 PM Spoke with Dr. Hernandez again. Plan to go back to [facility]. Not appropriate to go back to [facility].    At the conclusion of the encounter I discussed the results of all of the tests and the disposition. The questions were answered. The patient acknowledged understanding and was agreeable with the care plan.     MEDICATIONS GIVEN IN THE EMERGENCY:  Medications   vancomycin (VANCOCIN) 1,750 mg in 0.9% NaCl 500 mL intermittent infusion (1,750 mg Intravenous Given 12/16/22 6026)   0.9% sodium chloride BOLUS (1,000  mLs Intravenous New Bag 12/16/22 1635)   piperacillin-tazobactam (ZOSYN) 3.375 g vial to attach to  mL bag (3.375 g Intravenous Given 12/16/22 1637)   prothrombin 4 factor complex concentrate (KCENTRA) infusion 5,000 Units (5,000 Units Intravenous Given 12/16/22 1825)       NEW PRESCRIPTIONS STARTED AT TODAY'S ER VISIT  New Prescriptions    No medications on file            =================================================================    HPI:    Patient information was obtained from: Patient, EMR, hospitalist at [place]    Use of Interpretor: N/A      Blanco Osborne is a 57 year old male with a pertinent history of cardiac arrest, acute respiratory failure trach and vent dependent, PTX with right sided chest tube, aspergillus pneumonia, acute renal failure on dialysis, s/p liver transplant 2016 who presents to this ED via EMS from [place] for evaluation of pneumothorax.    Patient was transferred here from [place] due to development of a pneumothorax on the left after he noted to be more short of breath and desaturated this morning. He was sent to [place] [facility] yesterday after long hospitalization at [facility]. Hgb 6.6 this morning, eliquis was held. Concern for GI bleed as they also noted bloody stools this morning. Admitting team at [place] had apparently spoken with IR with plans for IR to place chest tube. Patient denies any pain.       REVIEW OF SYSTEMS:  Review of Systems   Respiratory: Positive for shortness of breath.    Gastrointestinal: Positive for blood in stool. Negative for abdominal pain.   Allergic/Immunologic: Positive for immunocompromised state.   Hematological: Bruises/bleeds easily (eliquis).   All other systems reviewed and are negative.      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     NAFLD (nonalcoholic fatty liver disease) 2016     CHRISTIAN on CPAP      Parainfluenza type 1 infection 2022     SBP (spontaneous bacterial peritonitis) (H)     [facility]     Sepsis due to Streptococcus pneumoniae with acute hypoxic respiratory failure (H) 2022     Tubular adenoma 10/2019    Large cecal adenoma- due for surveillance colonoscopy in 3 years (10/2022)       PAST SURGICAL HISTORY:  Past Surgical History:   Procedure Laterality Date     APPENDECTOMY       BENCH LIVER N/A 2016    Procedure: BENCH LIVER;  Surgeon: Enoc Crews MD;  Location: [facility] OR     COLONOSCOPY  13    repeat in 2018     COLONOSCOPY N/A 10/4/2019    Procedure: COLONOSCOPY, WITH POLYPECTOMY AND BIOPSY;  Surgeon: Go Chong MD;  Location: [facility] OR     HERNIA REPAIR       IR CHEST TUBE PLACEMENT NON-TUNNELED RIGHT  2022     IR CVC TUNNEL PLACEMENT > 5 YRS OF AGE  2022     IR GASTROSTOMY TUBE PERCUTANEOUS PLCMNT  2022     IR PARACENTESIS  2022     IR PARACENTESIS  2022     IR THORACENTESIS  2022     IR TRANSCATHETER BIOPSY  2022     TRACHEOSTOMY N/A 2022    Procedure: TRACHEOSTOMY;  Surgeon: Nilesh Jackson MD;  Location:  OR     TRANSPLANT LIVER RECIPIENT  DONOR N/A 2016    Procedure: TRANSPLANT LIVER RECIPIENT  DONOR;  Surgeon: Enoc Crews MD;  Location: [facility] OR           CURRENT MEDICATIONS:      Current Facility-Administered Medications:      vancomycin (VANCOCIN) 1,750 mg in 0.9% NaCl 500 mL intermittent infusion, 1,750 mg, Intravenous, Once, Ki Quijano MD, 1,750 mg at 22 2281    Current Outpatient Medications:      acetylcysteine (MUCOMYST) 20 % neb solution, Take 2 mLs by nebulization 2 times daily, Disp: 100 mL, Rfl: 1     albuterol (PROVENTIL) (2.5 MG/3ML) 0.083% neb solution, Take 1 vial (2.5 mg) by nebulization every 2  hours as needed for shortness of breath, Disp: 90 mL, Rfl: 1     apixaban ANTICOAGULANT (ELIQUIS) 5 MG tablet, 1 tablet (5 mg) by Oral or Feeding Tube route 2 times daily, Disp: 60 tablet, Rfl: 1     calcium carbonate (TUMS) 500 MG chewable tablet, Take 1-2 chew tab by mouth 4 times daily as needed for heartburn, Disp: , Rfl:      carboxymethylcellulose PF (REFRESH PLUS) 0.5 % ophthalmic solution, Place 1 drop into both eyes 3 times daily, Disp: 50 each, Rfl: 1     famotidine (PEPCID) 20 MG tablet, Take 20 mg by mouth 2 times daily as needed (heartburn), Disp: , Rfl:      folic acid 5 MG/ML injection, Inject 0.2 mLs (1 mg) into the vein daily, Disp: 50 mL, Rfl: 1     glucose 40 % (400 mg/mL) gel, Take 15-30 g by mouth every 15 minutes as needed for low blood sugar, Disp: 100 mL, Rfl: 1     hydrocortisone sodium succinate PF (SOLU-CORTEF) 100 MG injection, Inject 0.5 mLs (25 mg) into the vein every 6 hours, Disp: 100 mL, Rfl: 1     insulin glargine (LANTUS PEN) 100 UNIT/ML pen, Inject 30 Units Subcutaneous every morning (before breakfast), Disp: 15 mL, Rfl: 1     ipratropium - albuterol 0.5 mg/2.5 mg/3 mL (DUONEB) 0.5-2.5 (3) MG/3ML neb solution, Take 1 vial (3 mLs) by nebulization 4 times daily, Disp: 90 mL, Rfl: 1     lactulose (CHRONULAC) 10 GM/15ML solution, 30 mLs (20 g) by Oral or Feeding Tube route 3 times daily, Disp: 100 mL, Rfl: 1     midodrine (PROAMATINE) 10 MG tablet, 1 tablet (10 mg) by Oral or Feeding Tube route 3 times daily (with meals), Disp: 100 tablet, Rfl: 1     Multiple Vitamins-Minerals (MULTIVITAMINS W/MINERALS) liquid, 15 mLs by Per Feeding Tube route daily, Disp: 100 mL, Rfl: 1     nystatin (MYCOSTATIN) 620262 UNIT/ML suspension, Swish and swallow 5 mLs (500,000 Units) in mouth 4 times daily, Disp: 100 mL, Rfl: 1     ondansetron (ZOFRAN ODT) 4 MG ODT tab, Take 1 tablet (4 mg) by mouth every 6 hours as needed for nausea or vomiting, Disp: 30 tablet, Rfl: 1     order for DME, Equipment being  ordered: Compression knee high stockings for B LE lymphedema 20-30mmHg (Patient not taking: Reported on 2/20/2019), Disp: 1 each, Rfl: 0     order for DME, Equipment being ordered: Class 2 (30-40mmHg) compression knee high stockings, night time knee high compression alternative, bandaging supplies (Patient not taking: Reported on 2/20/2019), Disp: 1 each, Rfl: 0     oxyCODONE (ROXICODONE) 5 MG tablet, 1 tablet (5 mg) by Per Feeding Tube route every 4 hours as needed for moderate pain (4-6), Disp: 60 tablet, Rfl: 0     pantoprazole (PROTONIX) 2 mg/mL SUSP suspension, 20 mLs (40 mg) by Per Feeding Tube route every morning (before breakfast), Disp: 100 mL, Rfl: 1     rifaximin (XIFAXAN) 550 MG TABS tablet, 1 tablet (550 mg) by Per Feeding Tube route 2 times daily, Disp: 60 tablet, Rfl: 1     tacrolimus (GENERIC EQUIVALENT) 1 mg/mL suspension, 1 mL (1 mg) by Oral or Feeding Tube route 2 times daily, Disp: 100 mL, Rfl: 4      ALLERGIES:  No Known Allergies    FAMILY HISTORY:  No family history on file.    SOCIAL HISTORY:   Social History     Socioeconomic History     Marital status:    Tobacco Use     Smoking status: Never     Smokeless tobacco: Never   Substance and Sexual Activity     Alcohol use: Not Currently     Alcohol/week: 0.0 standard drinks     Drug use: No       VITALS:  Patient Vitals for the past 24 hrs:   BP Temp Temp src Pulse Resp SpO2 Weight   12/16/22 1909 101/63 97.4  F (36.3  C) -- 81 16 -- --   12/16/22 1846 95/59 -- -- 79 19 100 % --   12/16/22 1825 92/56 -- -- 76 18 100 % --   12/16/22 1823 -- -- -- 76 -- 100 % --   12/16/22 1810 (!) 87/53 -- -- 78 19 100 % --   12/16/22 1800 104/55 -- -- 84 21 99 % --   12/16/22 1745 98/54 -- -- 89 20 98 % --   12/16/22 1700 (!) 199/88 -- -- 85 -- 100 % --   12/16/22 1655 (!) 184/85 -- -- 84 -- 100 % --   12/16/22 1530 (!) 87/55 -- -- 88 -- 98 % --   12/16/22 1526 117/75 98.1  F (36.7  C) Oral 90 16 98 % 100.2 kg (221 lb)       PHYSICAL  EXAM  Constitutional: Well developed, chronically ill appearing  HENT: Normocephalic, Atraumatic, Bilateral external ears normal, mucous membranes dry, Dried blood from nose.  Neck: Supple, No stridor. Tracheostomy in place with large area of surrounding old appearing ecchymosis.   Eyes: PERRL, EOMI, Conjunctiva normal, No discharge.   Respiratory: Diminished breath sounds bilaterally, No wheezing, No cough. Chest tube on the right with 15 ml of bloody output.  Cardiovascular: Normal heart rate, Regular rhythm, No murmurs, No rubs, No gallops.  GI: Soft, No tenderness, No masses, No flank tenderness. No rebound or guarding. Maroon colored stool from rectum - fecal occult positive.  Musculoskeletal: No edema. No cyanosis, No clubbing.  Integument: Warm, Dry, No erythema, No rash. No petechiae. Several bruises on extremities in different stages of healing.  Neurologic: Alert & oriented x 3, Normal motor function, Normal sensory function, No focal deficits noted.  Psychiatric: Affect normal, Judgment normal, Mood normal. Cooperative.    LAB:  All pertinent labs reviewed and interpreted.  Recent Results (from the past 24 hour(s))   Glucose by meter    Collection Time: 12/15/22  8:52 PM   Result Value Ref Range    GLUCOSE BY METER POCT 111 (H) 70 - 99 mg/dL   Glucose by meter    Collection Time: 12/16/22 12:19 AM   Result Value Ref Range    GLUCOSE BY METER POCT 135 (H) 70 - 99 mg/dL   Glucose by meter    Collection Time: 12/16/22  5:47 AM   Result Value Ref Range    GLUCOSE BY METER POCT 152 (H) 70 - 99 mg/dL   Comprehensive metabolic panel    Collection Time: 12/16/22  6:26 AM   Result Value Ref Range    Sodium 137 136 - 145 mmol/L    Potassium 4.3 3.4 - 5.3 mmol/L    Chloride 98 98 - 107 mmol/L    Carbon Dioxide (CO2) 26 22 - 29 mmol/L    Anion Gap 13 7 - 15 mmol/L    Urea Nitrogen 96.6 (H) 6.0 - 20.0 mg/dL    Creatinine 3.10 (H) 0.67 - 1.17 mg/dL    Calcium 8.1 (L) 8.6 - 10.0 mg/dL    Glucose 167 (H) 70 - 99 mg/dL     Alkaline Phosphatase 253 (H) 40 - 129 U/L    AST 22 10 - 50 U/L    ALT 16 10 - 50 U/L    Protein Total 4.8 (L) 6.4 - 8.3 g/dL    Albumin 2.2 (L) 3.5 - 5.2 g/dL    Bilirubin Total 0.6 <=1.2 mg/dL    GFR Estimate 22 (L) >60 mL/min/1.73m2   CBC with platelets    Collection Time: 12/16/22  6:26 AM   Result Value Ref Range    WBC Count 3.4 (L) 4.0 - 11.0 10e3/uL    RBC Count 2.05 (L) 4.40 - 5.90 10e6/uL    Hemoglobin 6.6 (LL) 13.3 - 17.7 g/dL    Hematocrit 22.3 (L) 40.0 - 53.0 %     (H) 78 - 100 fL    MCH 32.2 26.5 - 33.0 pg    MCHC 29.6 (L) 31.5 - 36.5 g/dL    RDW 18.4 (H) 10.0 - 15.0 %    Platelet Count 83 (L) 150 - 450 10e3/uL   Prepare red blood cells (unit)    Collection Time: 12/16/22  7:30 AM   Result Value Ref Range    Blood Component Type Red Blood Cells     Product Code P5569S81     Unit Status Ready for issue     Unit Number [id number]     CROSSMATCH Compatible     CODING SYSTEM [id number]    Prepare red blood cells (unit)    Collection Time: 12/16/22  7:30 AM   Result Value Ref Range    Blood Component Type Red Blood Cells     Product Code V7704I09     Unit Status Ready for issue     Unit Number [id number]     CROSSMATCH Compatible     CODING SYSTEM [id number]    Adult Type and Screen    Collection Time: 12/16/22  8:29 AM   Result Value Ref Range    ABO/RH(D) O POS     Antibody Screen Negative Negative    SPECIMEN EXPIRATION DATE [id number]    XR Chest Port 1 View    Collection Time: 12/16/22  8:49 AM   Result Value Ref Range    Radiologist flags New left pneumothorax (AA)    Venous Panel POCT    Collection Time: 12/16/22  9:32 AM   Result Value Ref Range    pH Venous POCT 7.39 7.35 - 7.45    pCO2 Venous POCT 50 35 - 50 mm Hg    pO2 Venous POCT 31 25 - 47 mm Hg    Bicarbonate Venous POCT 28 24 - 30 mmol/L    Base Excess/Deficit (+/-) POCT 5.2 (H) -8.1 - 1.9 mmol/L    Sodium POCT 135 (L) 136 - 145 mmol/L    Hemoglobin POCT 7.4 (L) 13.3 - 17.7 g/dL    Glucose Whole Blood POCT 149 (H) 70 - 125 mg/dL     Lactic Acid POCT 1.1 0.7 - 2.0 mmol/L    Calcium, Ionized Whole Blood POCT 1.15 1.11 - 1.30 mmol/L    Potassium POCT 4.0 3.5 - 5.0 mmol/L    O2 Sat, Venous POCT 55.0 (L) 70.0 - 75.0 %   Hemoglobin and hematocrit    Collection Time: 12/16/22 11:29 AM   Result Value Ref Range    Hemoglobin 7.0 (L) 13.3 - 17.7 g/dL    Hematocrit 23.1 (L) 40.0 - 53.0 %   Glucose by meter    Collection Time: 12/16/22 11:39 AM   Result Value Ref Range    GLUCOSE BY METER POCT 177 (H) 70 - 99 mg/dL   Glucose by meter    Collection Time: 12/16/22 12:10 PM   Result Value Ref Range    GLUCOSE BY METER POCT 183 (H) 70 - 99 mg/dL   Prepare red blood cells (unit)    Collection Time: 12/16/22  4:15 PM   Result Value Ref Range    Blood Component Type Red Blood Cells     Product Code N1706E02     Unit Status Transfused     Unit Number [id number]     CROSSMATCH Compatible     CODING SYSTEM [id number]     ISSUE DATE AND TIME [id number]     UNIT ABO/RH O+     UNIT TYPE ISBT 5100    Prepare red blood cells (unit)    Collection Time: 12/16/22  4:15 PM   Result Value Ref Range    Blood Component Type Red Blood Cells     Product Code N2064U78     Unit Status Ready for issue     Unit Number [id number]     CROSSMATCH Compatible     CODING SYSTEM [id number]    INR    Collection Time: 12/16/22  4:20 PM   Result Value Ref Range    INR 1.58 (H) 0.85 - 1.15   PTT    Collection Time: 12/16/22  4:20 PM   Result Value Ref Range    aPTT 32 22 - 38 Seconds   Basic metabolic panel    Collection Time: 12/16/22  4:20 PM   Result Value Ref Range    Sodium 136 136 - 145 mmol/L    Potassium 4.2 3.5 - 5.0 mmol/L    Chloride 98 98 - 107 mmol/L    Carbon Dioxide (CO2) 26 22 - 31 mmol/L    Anion Gap 12 5 - 18 mmol/L    Urea Nitrogen 108 (H) 8 - 22 mg/dL    Creatinine 3.59 (H) 0.70 - 1.30 mg/dL    Calcium 8.3 (L) 8.5 - 10.5 mg/dL    Glucose 179 (H) 70 - 125 mg/dL    GFR Estimate 19 (L) >60 mL/min/1.73m2   Lactic acid whole blood    Collection Time: 12/16/22  4:20 PM    Result Value Ref Range    Lactic Acid 1.6 0.7 - 2.0 mmol/L   CBC with platelets and differential    Collection Time: 12/16/22  4:20 PM   Result Value Ref Range    WBC Count 4.5 4.0 - 11.0 10e3/uL    RBC Count 2.18 (L) 4.40 - 5.90 10e6/uL    Hemoglobin 7.0 (L) 13.3 - 17.7 g/dL    Hematocrit 23.3 (L) 40.0 - 53.0 %     (H) 78 - 100 fL    MCH 32.1 26.5 - 33.0 pg    MCHC 30.0 (L) 31.5 - 36.5 g/dL    RDW 18.4 (H) 10.0 - 15.0 %    Platelet Count 111 (L) 150 - 450 10e3/uL    % Neutrophils 86 %    % Lymphocytes 7 %    % Monocytes 6 %    % Eosinophils 0 %    % Basophils 0 %    % Immature Granulocytes 1 %    NRBCs per 100 WBC 0 <1 /100    Absolute Neutrophils 3.9 1.6 - 8.3 10e3/uL    Absolute Lymphocytes 0.3 (L) 0.8 - 5.3 10e3/uL    Absolute Monocytes 0.3 0.0 - 1.3 10e3/uL    Absolute Eosinophils 0.0 0.0 - 0.7 10e3/uL    Absolute Basophils 0.0 0.0 - 0.2 10e3/uL    Absolute Immature Granulocytes 0.0 <=0.4 10e3/uL    Absolute NRBCs 0.0 10e3/uL   Occult blood stool    Collection Time: 12/16/22  5:43 PM   Result Value Ref Range    Occult Blood Positive (A) Negative         RADIOLOGY:  Reviewed all pertinent imaging. Please see official radiology report.  XR Chest Port 1 View   Final Result   IMPRESSION: There is a new small bore left-sided chest tube present which projects in good position. Improved aeration of left lung with decrease left pleural effusion. However, there remains modest left pneumothorax measuring approximately 1.5 cm in    thickness along left lateral chest in 0.6 cm at left apex, previously the left pneumothorax measured up to 4 cm.      Right-sided chest tube remains in place, no change in the small right pleural effusion. No right pneumothorax. Right lung grossly clear.      Tracheostomy tube and right IJ tunnel dialysis catheter remain in good position. Heart size normal.          PROCEDURES:   Chest tube placement - see Dr. Quijano's note for procedure note.      Rocio Lester PA-C  Emergency  [facility]  12/16/2022     Rocio Lestre PA-C  12/16/22 1945

## 2022-12-16 NOTE — PLAN OF CARE
Problem: Plan of Care - These are the overarching goals to be used throughout the patient stay.    Goal: Plan of Care Review  Description: The Plan of Care Review/Shift note should be completed every shift.  The Outcome Evaluation is a brief statement about your assessment that the patient is improving, declining, or no change.  This information will be displayed automatically on your shift note.  Outcome: Progressing  Flowsheets (Taken 12/16/2022 1108)  Plan of Care Reviewed With:    patient    spouse     Problem: Anemia  Goal: Anemia Symptom Improvement  Outcome: Progressing  Intervention: Monitor and Manage Anemia  Recent Flowsheet Documentation  Taken 12/16/2022 0935 by Shilpa Myles RN  Safety Promotion/Fall Prevention:    bed alarm on    room door open    room near nurse's station   Goal Outcome Evaluation:      Plan of Care Reviewed With: patient, spouse      Hgb 12/16: hgb 6.6   6.6 Low Panic   7.3 Low   7.0 Low   6.5 Low Panic   7.2 Low   6.9 Low Panic     Chest Xray  results to day 12/16:   Similar size of moderate left pleural effusion. There is a new left pneumothorax visible in the lateral left apex and associated angular opacity related to the left lung towards the hilum consistent with related atelectasis.    Alert and oriented, very deconditioned, on TM and currently on full vent support.   For HD today-see HD orders.  Hgb 6.6 , type and screen done,  for transfusion , consent in the chart.. New Left Pneumothrax, will need a chest tube-plan to  transfer to higher level of care I, going to [facility]. Md discussed plan with wife and patient.  Shilpa Myles RN

## 2022-12-16 NOTE — PROGRESS NOTES
Located within [facility]    Medicine Progress Note - Hospitalist Service    Date of Admission:  12/15/2022    Assessment & Plan   Brief summary:  Blanco Osborne is a 57 year old male who is known to have paroxysmal A. fib, suspected COPD and hypertension.  He was admitted to an outside acute care hospital 11/22 for acute respiratory failure secondary to pneumonia.  His stay was complicated with cardiac arrest with prolonged resuscitation.  He was intubated and put on mechanical ventilation.  Failed extubation x2 .  Noted to be in ARDS now status post tracheostomy.  Noted to be in septic shock with multiorgan failure.  He requires intermittent hemodialysis for acute renal failure.  He stay at outside hospital was complicated with recurrent right-sided pneumothorax.  He now has chest tube in place on waterseal.  He had strep pneumonia, parainfluenza and Aspergillus.  Infectious disease consulted and completed 15-day course of ceftriaxone and 1 week course of voriconazole.  Noted to have ascites status post paracentesis 12/1.  Cytology negative for malignancy.  He is now on tube feedings for dysphagia and is physically deconditioned.  He has been transferred to LTAC for vent weaning and further cares.  LTAC course:  12/15.  Patient admitted into LTAC  12/16.  Overnight patient noted to have bloody stool.  Hb 6.  Repeat Hb 7.  Type screen and transfuse packed red blood cells.  Eliquis on hold.  Chest x-ray 12/16 reveals, new left pneumothorax.  Upon discussion with pulmonology team patient may need to be transferred to higher level of care for chest tube placement for new pneumothorax.  He also needs evaluation by GI considering bloody stools.  Discussed this plan with the patient and wife they are both agreeable.    Acute blood loss anemia  -Type screen and transfuse packed red blood cells.  -Eliquis on hold    GI bleed, most likely lower GI bleed  -Acute  -We will transfer patient for GI evaluation.  -Protonix.  -Awaiting blood  transfusion and monitor H&H    Acute on chronic respiratory failure - he remains on 30% and +5 PEEP, trach in place  History of COPD?  Plan:  -Now on trach dome, PEEP 5 FiO2 30%  -Will consult Pulmonology for vent weaning  -RT for trach care  -Good pulmonary hygiene  -Bronchodilators     Pneumothorax.  New onset left-sided pneumothorax  -Right sided pneumothorax, chest tube in place.   -May need transfer for chest tube placement for left-sided pneumothorax  -Monitor     Hx of pneumonia.  Stable  History of septic shock.  Resolved, off pressors. Stable  -Noted to have strep and parainfluenza pneumonia.  Completed course of antibiotics  -ID consulted from an outside hospital.  Was treated with voriconazole and also completed 15 days of ceftriaxone.  -Stable at this time.  Continue to monitor     Acute renal failure  -Secondary to septic shock  -S/p initial arrest and prolonged resuscitation (11/9/22) and hypothermia resuscitation   -On intermittent Hemodialysis     History of Liver Transplant 2016  Immunosuppression   - Tacrolimus at 1 mg bid  -  Solucortef.  -  Rifaximin     Paroxysmal a-fib. Stable  -Rate controlled at this time  -Not on rate control medication. On Eliquis for anticoagulation     CHRISTIAN  -Currently on the vent. On BiPAP previously     Dysphagia  -On tube feedings  -PEG tube in place  -Will consult nutritionist/RD  -Nova-source renal @60ml/hr    Diet: Adult Formula Drip Feeding: Continuous Novasource Renal; Gastrostomy; Goal Rate: 60; mL/hr    DVT Prophylaxis: Pneumatic Compression Devices  Nixon Catheter: Not present  Central Lines: PRESENT  PICC 11/24/22 Triple Lumen Right Brachial vein medial Access-Site Assessment: WDL  CVC Double Lumen Right External jugular Tunneled-Site Assessment: L  Cardiac Monitoring: ACTIVE order. Indication: History of paroxysmal A. fib  Code Status: Full Code      Disposition Plan     Expected Discharge Date: 12/21/2022    Discharge Delays: Complex Discharge             The patient's care was discussed with the Bedside Nurse, Care Coordinator/, Patient and Patient's Family.    Yahir Barcenas MD  Hospitalist Service  [facility]  Securely message with the Vocera Web Console (learn more here)  Text page via i[employer] Paging/Directory     Clinically Significant Risk Factors Present on Admission         # Hyponatremia: Lowest Na = 135 mmol/L in last 2 days, will monitor as appropriate  # Hypocalcemia: Lowest iCa = 1.15 mg/dL in last 2 days, will monitor and replace as appropriate     # Hypoalbuminemia: Lowest albumin = 1.8 g/dL at 12/15/2022  4:14 AM, will monitor as appropriate  # Drug Induced Coagulation Defect: home medication list includes an anticoagulant medication  # Thrombocytopenia: Lowest platelets = 78 in last 2 days, will monitor for bleeding           ______________________________________________________________________    Interval History   RN noted 1 large bloody stool.  Hb 6.  Repeat Hb 7.  Chest x-ray now reveals new left-sided pneumothorax.  Patient notes he is just about the same compared to time of admission.  Reports no new other complaints.    Review of system: All other systems are reviewed and found to be negative except as stated above in the interval history.    Data reviewed today: I reviewed all medications, new labs and imaging results over the last 24 hours. I personally reviewed    Physical Exam   Vital Signs: Temp: 97.7  F (36.5  C) Temp src: Oral BP: 109/67 Pulse: 91   Resp: 21 SpO2: 100 % O2 Device: Mechanical Ventilator Oxygen Delivery: 30 LPM  Weight: 221 lbs 0 oz  General: Patient is lying in bed comfortably in no distress.  He is awake alert oriented to person mood and affect appear normal  HEENT: Head is normocephalic atraumatic eyes pupils appear equal round and reactive to light conjunctiva is moist and pink sclera anicteric.  Neck: Viable trach noted  Lungs: He is on mechanical ventilation good air entry is noted no wheezing or crackles  noted, right-sided chest tube noted  Heart: He has a good S1 and S2 no obvious murmurs peripheral pulses are palpable, no JVD  Abdomen: Soft nontender, nondistended PEG tube noted no obvious organomegaly noted bowel sounds noted  Musculoskeletal: He has good muscle tone, nails and digits appear acyanotic, no obvious lower extremity edema noted  Skin: On inspection no obvious rashes noted is warm to touch skin turgor appear normal.  Please refer to nursing/wound care note for complete skin exam      Data   Recent Labs   Lab 12/16/22  1210 12/16/22  1139 12/16/22  1129 12/16/22  0932 12/16/22  0626 12/15/22  0809 12/15/22  0414 12/14/22  0944 12/14/22  0412 12/12/22  0526 12/12/22  0431   WBC  --   --   --   --  3.4*  --  3.7*  --  2.8*   < > 3.2*   HGB  --   --  7.0* 7.4* 6.6*  --  7.3*  --  7.0*   < > 6.9*   MCV  --   --   --   --  109*  --  109*  --  110*   < > 113*   PLT  --   --   --   --  83*  --  78*  --  58*   < > 49*   NA  --   --   --  135* 137  --  135  --  136   < > 138   POTASSIUM  --   --   --  4.0 4.3  --  4.0  --  4.1   < > 4.1   CHLORIDE  --   --   --   --  98  --  99  --  99   < > 101   CO2  --   --   --   --  26  --  30  --  28   < > 27   BUN  --   --   --   --  96.6*  --  72*  --  96*   < > 104*   CR  --   --   --   --  3.10*  --  2.39*  --  3.36*   < > 3.48*   ANIONGAP  --   --   --   --  13  --  6  --  9   < > 10   KELLY  --   --   --   --  8.1*  --  8.0*  --  7.9*   < > 8.0*   * 177*  --  149* 167*   < > 136*   < > 196*   < > 188*  176*   ALBUMIN  --   --   --   --  2.2*  --  1.8*  --   --    < > 1.6*   PROTTOTAL  --   --   --   --  4.8*  --   --   --   --   --  5.0*   BILITOTAL  --   --   --   --  0.6  --   --   --   --   --  0.8   ALKPHOS  --   --   --   --  253*  --   --   --   --   --  241*   ALT  --   --   --   --  16  --   --   --   --   --  15   AST  --   --   --   --  22  --   --   --   --   --  21    < > = values in this interval not displayed.     Recent Results (from the past 24  hour(s))   XR Chest Port 1 View   Result Value    Radiologist flags New left pneumothorax (AA)    Narrative    EXAM: XR CHEST PORT 1 VIEW  LOCATION: [facility]  DATE/TIME: 12/16/2022 8:49 AM    INDICATION: assess PTX  COMPARISON: Portable chest radiography 12/13/2022      Impression    IMPRESSION:     Similar size of moderate left pleural effusion. There is a new left pneumothorax visible in the lateral left apex and associated angular opacity related to the left lung towards the hilum consistent with related atelectasis.    Pleural drain in the right lateral chest is similar in position. No visible right pneumothorax. Opacity in the mid and lower right chest relates to layering pleural fluid and related atelectasis.    Right PICC extends to the middle third of the SVC and tunneled right jugular dialysis catheter terminates in the right atrium. Tracheostomy resides within the tracheal air column.    Left ventricular heart border is increasingly obscured by pleural fluid and adjacent lung opacity. Right atrial heart border is normal.      [Critical Result: New left pneumothorax]    Finding was identified on 12/16/2022 8:50 AM.     Patchy, the charge nurse at [facility] was contacted by me on 12/16/2022 8:59 AM and verbalized understanding of the critical result.         Medications     - MEDICATION INSTRUCTIONS -         sodium chloride 0.9%  250 mL Intravenous Once in dialysis/CRRT     sodium chloride 0.9%  300 mL Hemodialysis Machine Once     acetylcysteine  2 mL Nebulization 4x daily     [Held by provider] apixaban ANTICOAGULANT  5 mg Oral or Feeding Tube BID     carboxymethylcellulose PF  1 drop Both Eyes TID     epoetin mirta-epbx  10,000 Units Subcutaneous Once per day on Mon Wed Fri     folic acid  1 mg Intravenous Daily     sodium chloride (PF) 0.9%  10 mL Intracatheter Once in dialysis/CRRT    Followed by     heparin  1.3-2.6 mL Intracatheter Once in dialysis/CRRT     sodium  chloride (PF) 0.9%  10 mL Intracatheter Once in dialysis/CRRT    Followed by     heparin  1.3-2.6 mL Intracatheter Once in dialysis/CRRT     hydrocortisone sodium succinate PF  25 mg Intravenous Q6H     insulin aspart  1-12 Units Subcutaneous Q6H     insulin glargine  30 Units Subcutaneous QAM AC     ipratropium - albuterol 0.5 mg/2.5 mg/3 mL  3 mL Nebulization 4x daily     lactulose  20 g Oral or Feeding Tube TID     midodrine  10 mg Oral or Feeding Tube TID w/meals     multivitamins w/minerals  15 mL Per Feeding Tube Daily     - MEDICATION INSTRUCTIONS -   Does not apply Once     nystatin  500,000 Units Swish & Swallow 4x Daily     pantoprazole  40 mg Per Feeding Tube QAM AC     rifaximin  550 mg Per Feeding Tube BID     sodium chloride (PF)  10-40 mL Intracatheter Q8H     tacrolimus  1 mg Oral or Feeding Tube BID IS

## 2022-12-16 NOTE — CONSULTS
"        RENAL CONSULT NOTE    REQUESTING PHYSICIAN: Dr. Barcenas    REASON FOR CONSULT: LORETTA on intermittent dialysis    ASSESSMENT/PLAN:  LORETTA requiring hemodialysis  Initiated on CRRT 11/22.  Switched to IHD 11/29.  Currently dialyzing on MWF schedule.  Cr running mid 2's to 3's  Anuric  Monitor for evidence of renal recovery.  Dialysis MWF      Hypotension  BP stable on Midodrine 10mg TID    Anemia  hgb 7's-->6.6 this am.  Reports of \"brick red\" stool overnight.  Apixiban now on hold and 2 unit PRBCs planned.  Had been receiving Epo 10K at [facility]->continue here      Hypoxic/Hypercarpnic respiratory failure:  S/p trach 11/29  Pulmonary following    Right sided pneumothorax  Chest tube  Pulmonary following.    New left pneumothorax  Planning chest tube placement at [facility] later today  Pulmonary following.    Liver transplant for CARRILLO:  Immunosuppression: tacrolimus 1mg BID, hydrocortisone  Mgmt per transplant team.  Also noted to be on lactulose and rifampin.    Afib  A/C with apixaban    Malnutrition:  S/p PEG  On renal tube feed.    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  Follow weekly.      HPI: Blanco Osborne is a 57 year old male with PMH CARRILLO s/p liver transplant 2016.  Admitted to [facility] 11/12/22 with out of hospital PEA arrest. Course complicated by respiratory failure and LORETTA requiring dialysis.  He was started on CRRT 11/22, changed to IHD 11/29.  S/p trach/PEG  Transferred to [facility] 12/15 for ongoing care.   Wife at bedside.   Blanco c/o headache and abd pain.  Wife reports he has had this in the past with the urge to urinate.   No SOB.  No dizziness.  Generally tolerating dialysis treatments.  New pneumonthorax found on imaging and plan is for chest tube placement at [facility] today.  Updated with plan for dialysis today, but may go for chest tube first since there is no pressing dialysis need.     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     NAFLD (nonalcoholic fatty liver disease) 2/11/2016     CHRISTIAN on CPAP      Parainfluenza type 1 infection 11/19/2022     SBP (spontaneous bacterial peritonitis) (H)     [facility]     Sepsis due to Streptococcus pneumoniae with acute hypoxic respiratory failure (H) 11/19/2022     Tubular adenoma 10/2019    Large cecal adenoma- due for surveillance colonoscopy in 3 years (10/2022)     Social History     Socioeconomic History     Marital status:      Spouse name: Not on file     Number of children: Not on file     Years of education: Not on file     Highest education level: Not on file   Occupational History     Not on file   Tobacco Use     Smoking status: Never     Smokeless tobacco: Never   Substance and Sexual Activity     Alcohol use: Not Currently     Alcohol/week: 0.0 standard drinks     Drug use: No     Sexual activity: Not on file   Other Topics Concern     Parent/sibling w/ CABG, MI or angioplasty before 65F 55M? Not Asked   Social History Narrative     Not on file     Social Determinants of Health     Financial Resource Strain: Not on file   Food Insecurity: Not on file   Transportation Needs: Not on file   Physical Activity: Not on file   Stress: Not on file   Social Connections: Not on file   Intimate Partner Violence: Not on file   Housing Stability: Not on file          ALLERGIES/SENSITIVITIES:  No Known Allergies      PHYSICAL EXAM:  Physical Exam   Temp: 97.8  F (36.6  C) Temp src: Oral BP: 101/63 Pulse: 91   Resp: 18 SpO2: 97 % O2 Device: Mechanical Ventilator Oxygen Delivery: 30 LPM  Vitals:    12/15/22 1500 12/16/22 0551   Weight: 89 kg (196 lb 3.2 oz) 100.2 kg (221 lb)     Vital  Signs with Ranges  Temp:  [97.6  F (36.4  C)-98.8  F (37.1  C)] 97.8  F (36.6  C)  Pulse:  [] 91  Resp:  [16-31] 18  BP: ()/(56-93) 101/63  FiO2 (%):  [30 %-40 %] 30 %  SpO2:  [87 %-100 %] 97 %  I/O last 3 completed shifts:  In: 907 [I.V.:30; NG/GT:877]  Out: -     Temp (24hrs), Av  F (36.7  C), Min:97.6  F (36.4  C), Max:98.8  F (37.1  C)      Patient Vitals for the past 72 hrs:   Weight   22 0551 100.2 kg (221 lb)   12/15/22 1500 89 kg (196 lb 3.2 oz)       General: Alert, NAD, chronically ill appearing  HEENT:  Pupils equal, round  NECK:   no JVD noted  RESPIRATORY:  Lungs clear ant, normal effort, trach  CARDIOVASCULAR:  RRR,  Trace lower leg edema, pedal pulses palpable  ABDOMEN:  soft, +distended. BS present  GENITOURINARY:  No gomez   INTEGUMENTARY: Warm, dry, petechial appearing rash left arm.  NEUROLOGIC: grossly intact  Psych: calm  ACCESS: Right CVC    Laboratory:     Recent Labs   Lab 22  0932 22  0626 12/15/22  0414 22  0412 22  0427 22  0526 22  0431 12/10/22  1017   WBC  --  3.4* 3.7* 2.8* 4.6  --  3.2* 6.4   RBC  --  2.05* 2.26* 2.11* 1.95*  --  2.05* 2.55*   HGB 7.4* 6.6* 7.3* 7.0* 6.5* 7.2* 6.9* 8.7*   HCT  --  22.3* 24.7* 23.1* 22.0*  --  23.1* 27.8*   PLT  --  83* 78* 58* 63*  --  49* 70*       Basic Metabolic Panel:  Recent Labs   Lab 22  0932 22  0626 22  0547 22  0019 12/15/22  2052 12/15/22  1748 12/15/22  0809 12/15/22  0414 22  0944 22  0412 22  0748 22  0427 22  0757 22  0431 12/10/22  2024 12/10/22  2022   * 137  --   --   --   --   --  135  --  136  --  136  --  138  --  138   POTASSIUM 4.0 4.3  --   --   --   --   --  4.0  --  4.1  --  3.8  --  4.1  --  3.5   CHLORIDE  --  98  --   --   --   --   --  99  --  99  --  101  --  101  --  103   CO2  --  26  --   --   --   --   --  30  --  28  --  29  --  27  --  28   BUN  --  96.6*  --   --   --   --   --  72*  --  96*   --  72*  --  104*  --  64*   CR  --  3.10*  --   --   --   --   --  2.39*  --  3.36*  --  2.55*  --  3.48*  --  2.24*   * 167* 152* 135* 111* 78   < > 136*   < > 196*   < > 222*  210*   < > 188*  176*   < > 166*   KELLY  --  8.1*  --   --   --   --   --  8.0*  --  7.9*  --  7.4*  --  8.0*  --  7.3*    < > = values in this interval not displayed.       INRNo lab results found in last 7 days.    Recent Labs   Lab Test 12/16/22  0932 12/16/22  0626 12/15/22  0414   POTASSIUM 4.0 4.3 4.0   CHLORIDE  --  98 99   BUN  --  96.6* 72*      Recent Labs   Lab Test 12/16/22  0626 12/15/22  0414 12/13/22  0427 12/12/22  0431 11/25/22  1947 11/25/22  1859 07/10/18  0720 10/31/17  1038   ALBUMIN 2.2* 1.8*   < > 1.6*   < >  --    < >  --    BILITOTAL 0.6  --   --  0.8   < >  --    < >  --    ALT 16  --   --  15   < >  --    < >  --    AST 22  --   --  21   < >  --    < >  --    PROTEIN  --   --   --   --   --  70*  --  Negative    < > = values in this interval not displayed.       Personally reviewed today's laboratory studies      Thank you for involving us in the care of this patient. We will continue to follow along with you.      Linda Mcmhaon NP  [facility]  [phone]

## 2022-12-17 ENCOUNTER — APPOINTMENT (OUTPATIENT)
Dept: CT IMAGING | Facility: CLINIC | Age: 57
DRG: 207 | End: 2022-12-17
Attending: INTERNAL MEDICINE
Payer: COMMERCIAL

## 2022-12-17 LAB
ALBUMIN BODY FLUID SOURCE: NORMAL
ALBUMIN FLD-MCNC: 0.8 G/DL
BACTERIA SPEC CULT: NORMAL
BASE EXCESS BLDV CALC-SCNC: -2.8 MMOL/L (ref -7.7–1.9)
GLUCOSE BLDC GLUCOMTR-MCNC: 102 MG/DL (ref 70–99)
GLUCOSE BLDC GLUCOMTR-MCNC: 113 MG/DL (ref 70–99)
GLUCOSE BLDC GLUCOMTR-MCNC: 119 MG/DL (ref 70–99)
GLUCOSE BLDC GLUCOMTR-MCNC: 120 MG/DL (ref 70–99)
GLUCOSE BLDC GLUCOMTR-MCNC: 80 MG/DL (ref 70–99)
GLUCOSE BLDC GLUCOMTR-MCNC: 97 MG/DL (ref 70–99)
HBV SURFACE AG SERPL QL IA: NONREACTIVE
HCO3 BLDV-SCNC: 24 MMOL/L (ref 21–28)
HGB BLD-MCNC: 8.1 G/DL (ref 13.3–17.7)
HGB BLD-MCNC: 8.8 G/DL (ref 13.3–17.7)
HGB BLD-MCNC: 9.3 G/DL (ref 13.3–17.7)
HIV 1+2 AB+HIV1 P24 AG SERPL QL IA: NONREACTIVE
LD BODY BODY FLUID SOURCE: NORMAL
LDH FLD L TO P-CCNC: 273 U/L
O2/TOTAL GAS SETTING VFR VENT: 30 %
OXYHGB MFR BLDV: 82 % (ref 70–75)
PCO2 BLDV: 50 MM HG (ref 40–50)
PH BLDV: 7.29 [PH] (ref 7.32–7.43)
PO2 BLDV: 56 MM HG (ref 25–47)
PROT FLD-MCNC: 2.2 G/DL
PROTEIN BODY FLUID SOURCE: NORMAL

## 2022-12-17 PROCEDURE — 250N000012 HC RX MED GY IP 250 OP 636 PS 637: Performed by: INTERNAL MEDICINE

## 2022-12-17 PROCEDURE — 258N000003 HC RX IP 258 OP 636: Performed by: INTERNAL MEDICINE

## 2022-12-17 PROCEDURE — 94640 AIRWAY INHALATION TREATMENT: CPT | Mod: 76

## 2022-12-17 PROCEDURE — 999N000157 HC STATISTIC RCP TIME EA 10 MIN

## 2022-12-17 PROCEDURE — 99291 CRITICAL CARE FIRST HOUR: CPT | Performed by: INTERNAL MEDICINE

## 2022-12-17 PROCEDURE — C9113 INJ PANTOPRAZOLE SODIUM, VIA: HCPCS | Performed by: INTERNAL MEDICINE

## 2022-12-17 PROCEDURE — 999N000040 HC STATISTIC CONSULT NO CHARGE VASC ACCESS

## 2022-12-17 PROCEDURE — 250N000013 HC RX MED GY IP 250 OP 250 PS 637: Performed by: INTERNAL MEDICINE

## 2022-12-17 PROCEDURE — 85018 HEMOGLOBIN: CPT | Performed by: INTERNAL MEDICINE

## 2022-12-17 PROCEDURE — 200N000001 HC R&B ICU

## 2022-12-17 PROCEDURE — 99232 SBSQ HOSP IP/OBS MODERATE 35: CPT | Performed by: INTERNAL MEDICINE

## 2022-12-17 PROCEDURE — 999N000190 HC STATISTIC VAT ROUNDS

## 2022-12-17 PROCEDURE — 82805 BLOOD GASES W/O2 SATURATION: CPT | Performed by: INTERNAL MEDICINE

## 2022-12-17 PROCEDURE — 94640 AIRWAY INHALATION TREATMENT: CPT

## 2022-12-17 PROCEDURE — 5A1D70Z PERFORMANCE OF URINARY FILTRATION, INTERMITTENT, LESS THAN 6 HOURS PER DAY: ICD-10-PCS | Performed by: INTERNAL MEDICINE

## 2022-12-17 PROCEDURE — 94003 VENT MGMT INPAT SUBQ DAY: CPT

## 2022-12-17 PROCEDURE — 250N000009 HC RX 250: Performed by: INTERNAL MEDICINE

## 2022-12-17 PROCEDURE — 634N000001 HC RX 634: Performed by: INTERNAL MEDICINE

## 2022-12-17 PROCEDURE — 87070 CULTURE OTHR SPECIMN AEROBIC: CPT | Performed by: INTERNAL MEDICINE

## 2022-12-17 PROCEDURE — 90937 HEMODIALYSIS REPEATED EVAL: CPT

## 2022-12-17 PROCEDURE — 71250 CT THORAX DX C-: CPT

## 2022-12-17 PROCEDURE — 94002 VENT MGMT INPAT INIT DAY: CPT

## 2022-12-17 PROCEDURE — 83615 LACTATE (LD) (LDH) ENZYME: CPT | Performed by: INTERNAL MEDICINE

## 2022-12-17 PROCEDURE — 250N000011 HC RX IP 250 OP 636: Performed by: INTERNAL MEDICINE

## 2022-12-17 PROCEDURE — 82042 OTHER SOURCE ALBUMIN QUAN EA: CPT | Performed by: INTERNAL MEDICINE

## 2022-12-17 PROCEDURE — 84157 ASSAY OF PROTEIN OTHER: CPT | Performed by: INTERNAL MEDICINE

## 2022-12-17 PROCEDURE — 87106 FUNGI IDENTIFICATION YEAST: CPT | Performed by: INTERNAL MEDICINE

## 2022-12-17 RX ORDER — DEXTROSE MONOHYDRATE 50 MG/ML
INJECTION, SOLUTION INTRAVENOUS CONTINUOUS
Status: DISCONTINUED | OUTPATIENT
Start: 2022-12-17 | End: 2022-12-20

## 2022-12-17 RX ADMIN — IPRATROPIUM BROMIDE AND ALBUTEROL SULFATE 3 ML: .5; 3 SOLUTION RESPIRATORY (INHALATION) at 19:19

## 2022-12-17 RX ADMIN — MIDODRINE HYDROCHLORIDE 10 MG: 5 TABLET ORAL at 12:23

## 2022-12-17 RX ADMIN — Medication 1 DROP: at 22:38

## 2022-12-17 RX ADMIN — IPRATROPIUM BROMIDE AND ALBUTEROL SULFATE 3 ML: .5; 3 SOLUTION RESPIRATORY (INHALATION) at 15:09

## 2022-12-17 RX ADMIN — SODIUM CHLORIDE 8 MG/HR: 9 INJECTION, SOLUTION INTRAVENOUS at 20:36

## 2022-12-17 RX ADMIN — FOLIC ACID 1 MG: 5 INJECTION, SOLUTION INTRAMUSCULAR; INTRAVENOUS; SUBCUTANEOUS at 09:47

## 2022-12-17 RX ADMIN — RIFAXIMIN 550 MG: 550 TABLET ORAL at 10:12

## 2022-12-17 RX ADMIN — CHLORHEXIDINE GLUCONATE 0.12% ORAL RINSE 15 ML: 1.2 LIQUID ORAL at 20:37

## 2022-12-17 RX ADMIN — EPOETIN ALFA-EPBX 6000 UNITS: 3000 INJECTION, SOLUTION INTRAVENOUS; SUBCUTANEOUS at 18:19

## 2022-12-17 RX ADMIN — TACROLIMUS 1 MG: 5 CAPSULE ORAL at 09:48

## 2022-12-17 RX ADMIN — SODIUM CHLORIDE 300 ML: 9 INJECTION, SOLUTION INTRAVENOUS at 16:05

## 2022-12-17 RX ADMIN — DEXTROSE MONOHYDRATE: 50 INJECTION, SOLUTION INTRAVENOUS at 13:18

## 2022-12-17 RX ADMIN — SODIUM CHLORIDE 250 ML: 9 INJECTION, SOLUTION INTRAVENOUS at 16:05

## 2022-12-17 RX ADMIN — OXYCODONE HYDROCHLORIDE 5 MG: 5 TABLET ORAL at 12:10

## 2022-12-17 RX ADMIN — DEXTROSE MONOHYDRATE: 50 INJECTION, SOLUTION INTRAVENOUS at 03:30

## 2022-12-17 RX ADMIN — NYSTATIN 500000 UNITS: 100000 SUSPENSION ORAL at 12:24

## 2022-12-17 RX ADMIN — HYDROCORTISONE SODIUM SUCCINATE 25 MG: 100 INJECTION, POWDER, FOR SOLUTION INTRAMUSCULAR; INTRAVENOUS at 18:43

## 2022-12-17 RX ADMIN — IPRATROPIUM BROMIDE AND ALBUTEROL SULFATE 3 ML: .5; 3 SOLUTION RESPIRATORY (INHALATION) at 11:51

## 2022-12-17 RX ADMIN — MULTIVITAMIN 15 ML: LIQUID ORAL at 08:08

## 2022-12-17 RX ADMIN — HYDROCORTISONE SODIUM SUCCINATE 25 MG: 100 INJECTION, POWDER, FOR SOLUTION INTRAMUSCULAR; INTRAVENOUS at 09:48

## 2022-12-17 RX ADMIN — NYSTATIN 500000 UNITS: 100000 SUSPENSION ORAL at 22:38

## 2022-12-17 RX ADMIN — SODIUM CHLORIDE 8 MG/HR: 9 INJECTION, SOLUTION INTRAVENOUS at 09:50

## 2022-12-17 RX ADMIN — OXYCODONE HYDROCHLORIDE 5 MG: 5 TABLET ORAL at 16:48

## 2022-12-17 RX ADMIN — NYSTATIN 500000 UNITS: 100000 SUSPENSION ORAL at 08:08

## 2022-12-17 RX ADMIN — MIDODRINE HYDROCHLORIDE 10 MG: 5 TABLET ORAL at 17:43

## 2022-12-17 RX ADMIN — TACROLIMUS 1 MG: 5 CAPSULE ORAL at 20:54

## 2022-12-17 RX ADMIN — RIFAXIMIN 550 MG: 550 TABLET ORAL at 20:37

## 2022-12-17 RX ADMIN — SODIUM CHLORIDE 8 MG/HR: 9 INJECTION, SOLUTION INTRAVENOUS at 00:37

## 2022-12-17 RX ADMIN — IPRATROPIUM BROMIDE AND ALBUTEROL SULFATE 3 ML: .5; 3 SOLUTION RESPIRATORY (INHALATION) at 00:09

## 2022-12-17 RX ADMIN — IPRATROPIUM BROMIDE AND ALBUTEROL SULFATE 3 ML: .5; 3 SOLUTION RESPIRATORY (INHALATION) at 07:33

## 2022-12-17 RX ADMIN — HYDROCORTISONE SODIUM SUCCINATE 25 MG: 100 INJECTION, POWDER, FOR SOLUTION INTRAMUSCULAR; INTRAVENOUS at 06:32

## 2022-12-17 RX ADMIN — Medication 1 DROP: at 17:43

## 2022-12-17 RX ADMIN — MIDODRINE HYDROCHLORIDE 10 MG: 5 TABLET ORAL at 08:08

## 2022-12-17 RX ADMIN — LACTULOSE 20 G: 20 SOLUTION ORAL at 08:08

## 2022-12-17 RX ADMIN — Medication 1 DROP: at 08:08

## 2022-12-17 RX ADMIN — CHLORHEXIDINE GLUCONATE 0.12% ORAL RINSE 15 ML: 1.2 LIQUID ORAL at 08:07

## 2022-12-17 RX ADMIN — NYSTATIN 500000 UNITS: 100000 SUSPENSION ORAL at 18:43

## 2022-12-17 ASSESSMENT — ACTIVITIES OF DAILY LIVING (ADL)
ADLS_ACUITY_SCORE: 45
ADLS_ACUITY_SCORE: 47
ADLS_ACUITY_SCORE: 45
ADLS_ACUITY_SCORE: 43
ADLS_ACUITY_SCORE: 45
ADLS_ACUITY_SCORE: 47
ADLS_ACUITY_SCORE: 47

## 2022-12-17 NOTE — H&P
[facility] Intensive Care Unit  History and Physical  December 16, 2022      Blanco Osborne MRN# [id number]   Age: 57 year old YOB: 1965     Date of Admission: 12/16/2022    Primary care provider: Ki Powers            Assessment and Plan:     Blanco Osborne is a 57 year old male admitted on 12/16/2022 for No chief complaint on file.  . My assessment and plan for this patient is as follows:    SUMMARY: Blanco Osborne is a 57 year old male with paroxysmal A. fib, Liver transplant (2016), CHRISTIAN on BiPAP, h/o CVA and hypertension. He is s/p Trach/PEG following acute resp failure and PEA due to Strep/para influenza pna. He had septic shock with MSOF and is currently on iHD. He had Rt sided Chest tube placed for hydroptx on 12/12/22. He was transferred to [place] on 12/14/22 for CIP. Overnight noted to have bloody stool and today in AM had left Ptx which required left sided chest tube leading to ~900ml of blood return too.   He was given 2uPRBC's and transferred to [facility].    Neurology and Psychiatry: Sedation and analgesia achieved by;  1. H/o CVA:  2. CIP:    Plan:    Pulmonary: Lung protective ventilation strategy with a tidal volume of 6-8mL/kg of ideal body weight, head of the bed elevated at 30 degrees, and oral care.  1. H/o Strep/Parainfluenza pna leading to Acute resp failure: Failed extubation x2. S/p Trach on 11/29/22.   2. H/o CHRISTIAN was on BiPAP, currently Trach'ed.  3. New Left Ptx: Etiology unclear. Pigtail placed on 12/16/2022   4. Left Hemothorax: Etiology unclear. The fluid is more blood tinged now.    Plan:  - Continue full vent support overnight.  - He was doing TD for 12hrs, which can be contd in AM.  - HOld off mucomyst nebs and metanebs for today.  - Follow Hemoglobin Q6hrs.  - Place both chest tubes to -20cm H2O for tonight.  - Check Left pleural fluid labs.      Cardiovascular system:  1. H/o HTN  2. Afib: On Eliquis.    Plan:  - Hold  Eliquis.      Renal/Electrolytes:  1. LORETTA: was on CRRT but now on iHD. He was on MWF schedule. Missed it today. NOt acute need noted. Still Anuric.    Plan:  - Consult Nephrology.    ID:  1. H/o Strep/Parainfluenza infection: Completed treatment course  2. H/o Aspergillus: Was on Vori for <15days.  3. 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    Plan:  - No acute issues.  - Check Left/Rt pleural fluid cultures.    GI/:  1. GI bleed: Had a bloody stool overnight - last night.  Etiology ? Gastric ulcers vs. AVM's in colon. There is a question of cirrhosis raising the possibility of variceal bleed.  2. Ascites: S/p Tap on 12/1 - no evidence of infection/Malignancy  3. LIver tx: Cont Tac goal level of 5 -7 and solucortef.     Plan:  - Consult GI. No emergent need for Scope  - Eliquis is being held.  - cont lactulose and rifaximin.  - cont midodrine      Musculoskeletal/Rheumatology:  1. Weakness due to CIP    Plan:    Endocrine:  1. DM:    Plan:  - Was on lantus 30u daily. Will hold or cut down by 50%.    Nutrition:   - Hold TF due to possible GI bleed.    Heme/Onc:  1. Anemia:  2. Thrombocytopenia:    Plan:  - Will monitor.    ICU prophylaxis:      DVT; SCDs        VAP; As above     Restrain needed: No     Stress ulcer: IV PPI     Central line: Needed today for IV access/Meds.    Code Status: Full    Prognosis: Critical    Family update: Done at bedside by me.    Billing: total time spend providing critical care was 60min, excluding the procedures.    Jarrell Terry MD            Treatment goals for next 24 hours:   1. NPO overnight.  2. GI consult in AM.  3. 1u PRBC to be completed.  4. Follow hemoglobin Q4hrs.  5.         History of Present Illness:     Blanco Osborne is a 57 year old male with paroxysmal A. fib, Liver transplant (2016) on Tacrolimus and solucortef, suspected COPD, CHRISTIAN on BiPAP, h/o CVA and hypertension.  He was admitted on 11/22/22 for acute respiratory failure secondary to pneumonia  (Strep and Para influenza). He had out of hospital cardiac arrest with prolonged resuscitation along with Cooling. He developed ARDS, septic shock with multiorgan failure, initially CRRT but now on iHD for LORETTA and Rt sided Ptx requiring Chest tube. Failed extubation x2.  Status post tracheostomy (12/8) and has Shiley prox XLT and PEG.  He was tolerating 14hrs of TD prior to discharge. He had Rt sided Chest tube placed for hydroptx on 12/12/22. He was transferred to [place] on 12/14/22 for CIP. Noted to have ascites status post paracentesis 12/1.  Cytology negative for malignancy.      Today in AM had CXR which showed new left Ptx. There was also concern for new GI bleed (Brick red stool overnight) and Hgb was down to 6, repeat was 7. Apixaban held and is being given 2u PRBC's. Pt was on Alb/mucomyst nebs and meta nebs too.  Post Left chest tube placement - pt had about 950ml of bloody fluid vs. Blood through chest tube.           Mechanical Ventilation:     Day #   Vent Mode: CMV/AC  (Continuous Mandatory Ventilation/ Assist Control)  FiO2 (%): 40 %  Resp Rate (Set): 16 breaths/min  Tidal Volume (Set, mL): 450 mL  PEEP (cm H2O): 5 cmH2O  Resp: 16    Peak airway pressure:   Plateau airway pressure:   Auto-PEEP:            Lines:     PICC:    (placed on  11/224/22)   Central venous line:  (placed on  )   Periph IV:  Vasc cath: (placed on )  Nixon cath: (placed on )   ETT #  NGT: (placed on)   Feeding tube/Dobhoff: (placed on )   PEG: (placed on 11/29/22)   Tracheostomy: (placed on 11/29/22 )   Left Chest tube: (placed on 12/16/22)  Right chest tube: Placed on 12/12/22           Feeding:     Enteral Feeding:  Has PEG, will hold tube feeds for now.  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     NAFLD (nonalcoholic fatty liver disease) 2016     CHRISTIAN on CPAP      Parainfluenza type 1 infection 2022     SBP (spontaneous bacterial peritonitis) (H)     [facility]     Sepsis due to Streptococcus pneumoniae with acute hypoxic respiratory failure (H) 2022     Tubular adenoma 10/2019    Large cecal adenoma- due for surveillance colonoscopy in 3 years (10/2022)             Past Surgical History:     Past Surgical History:   Procedure Laterality Date     APPENDECTOMY       BENCH LIVER N/A 2016    Procedure: BENCH LIVER;  Surgeon: Enoc rCews MD;  Location: [facility] OR     COLONOSCOPY  13    repeat in 2018     COLONOSCOPY N/A 10/4/2019    Procedure: COLONOSCOPY, WITH POLYPECTOMY AND BIOPSY;  Surgeon: Go Chong MD;  Location: [facility] OR     HERNIA REPAIR       IR CHEST TUBE PLACEMENT NON-TUNNELED RIGHT  2022     IR CVC TUNNEL PLACEMENT > 5 YRS OF AGE  2022     IR GASTROSTOMY TUBE PERCUTANEOUS PLCMNT  2022     IR PARACENTESIS  2022     IR PARACENTESIS  2022     IR THORACENTESIS  2022     IR TRANSCATHETER BIOPSY  2022     TRACHEOSTOMY N/A 2022    Procedure: TRACHEOSTOMY;  Surgeon: Nilesh Jackson MD;  Location:  OR     TRANSPLANT LIVER RECIPIENT  DONOR N/A 2016    Procedure: TRANSPLANT LIVER RECIPIENT  DONOR;  Surgeon: Enoc Crews MD;  Location: [facility] OR             Social History:     Social History     Socioeconomic History     Marital status:      Spouse name: Not on file     Number of children: Not on file     Years of education: Not on file     Highest education level: Not on file   Occupational History     Not on file   Tobacco Use     Smoking status:  Never     Smokeless tobacco: Never   Substance and Sexual Activity     Alcohol use: Not Currently     Alcohol/week: 0.0 standard drinks     Drug use: No     Sexual activity: Not on file   Other Topics Concern     Parent/sibling w/ CABG, MI or angioplasty before 65F 55M? Not Asked   Social History Narrative     Not on file     Social Determinants of Health     Financial Resource Strain: Not on file   Food Insecurity: Not on file   Transportation Needs: Not on file   Physical Activity: Not on file   Stress: Not on file   Social Connections: Not on file   Intimate Partner Violence: Not on file   Housing Stability: Not on file         Smoking:   Social History     Tobacco Use     Smoking status: Never     Smokeless tobacco: Never   Substance Use Topics     Alcohol use: Not Currently     Alcohol/week: 0.0 standard drinks     Drug use: No              Family History:   No family history on file.             Medications:     No current facility-administered medications for this encounter.               Review of Systems:     General: no fever, chills, weakness  HEENT: No loss of hearing, vertigo, ear ringing, double/blurry vision, sore throat, epistaxis  Pulmonary: No dyspnea, wheezing, cough, sputum, hemoptysis, chest pain  Sleep: No EDS, snoring, insomnia, cataplexy, restless legs, REM behavior   CVS: No chest discomfort, palpitations  GI: No diarrhea, constipation, dysphagia, early satiety, bleeding. Abd soreness/bloating and cramps.  Renal: No pain on urination, hematuria, freq micturation  Musculoskeletal: No muscle ache, joint pain, swelling  Skin: No rash, itching, icterus  Neurology: No tingling, weakness, numbness  Heme: Has easy bruisibility or bleeding  Allergy: runny nose, sneezing, urticeria  Psychiatry: no change in mood and affect         Physical Examination:   General: Alert, oriented, not in distress. S/p Trach  Vitals: /72   Pulse 76   Resp 16   Wt 88 kg (194 lb 0.1 oz)   SpO2 100%   BMI 26.31  kg/m     SpO2: 100 %    HEENT: neck supple, symmetrical, no lymph node enlargement, thyromegaly, bruit, JVD, pupils are isocoric and equally responsive to the light. Mallampati score, Grade III  Trach +. Healing lip lesions.  Lungs: both hemithoraces are symmetrical, normal to palpation, no dullness to percussion, auscultation of lungs revealed bilateral coarse crackles. Rubén chest tubes in place  CVS: Normal S1 and S2, no additional heart sounds, murmur, rub, normal peripheral pulses  Abdomen: Bowel sounds present, soft, Diffusely mildly tender, mildly distended, no organomegaly, ascitis, mass  Extremities/musculoskeletal: no peripheral edema, deformity, cyanosis, clubbing  Neurology: alert, orientedx3, no motor/sensorial deficits, cranial nerves grossly normal  Skin: no rash  Psychiatry: Mood and affect are appropriate       Exam of Line sites: No erythema or discharge.          Labs:     CBC RESULTS:     Recent Labs   Lab 12/16/22  1620 12/16/22  1129 12/16/22  0932 12/16/22  0626 12/15/22  0414 12/14/22  0412 12/13/22  0427 12/12/22  0526 12/12/22  0431   WBC 4.5  --   --  3.4* 3.7* 2.8* 4.6  --  3.2*   RBC 2.18*  --   --  2.05* 2.26* 2.11* 1.95*  --  2.05*   HGB 7.0* 7.0* 7.4* 6.6* 7.3* 7.0* 6.5*   < > 6.9*   HCT 23.3* 23.1*  --  22.3* 24.7* 23.1* 22.0*  --  23.1*   *  --   --  83* 78* 58* 63*  --  49*    < > = values in this interval not displayed.       Basic Metabolic Panel:  Recent Labs   Lab 12/16/22  1620 12/16/22  1210 12/16/22  1139 12/16/22  0932 12/16/22  0626 12/16/22  0547 12/15/22  0809 12/15/22  0414 12/14/22  0944 12/14/22  0412 12/13/22  0748 12/13/22  0427 12/12/22  0757 12/12/22  0431     --   --  135* 137  --   --  135  --  136  --  136  --  138   POTASSIUM 4.2  --   --  4.0 4.3  --   --  4.0  --  4.1  --  3.8  --  4.1   CHLORIDE 98  --   --   --  98  --   --  99  --  99  --  101  --  101   CO2 26  --   --   --  26  --   --  30  --  28  --  29  --  27   *  --   --   --   96.6*  --   --  72*  --  96*  --  72*  --  104*   CR 3.59*  --   --   --  3.10*  --   --  2.39*  --  3.36*  --  2.55*  --  3.48*   * 183* 177* 149* 167* 152*   < > 136*   < > 196*   < > 222*  210*   < > 188*  176*   KELLY 8.3*  --   --   --  8.1*  --   --  8.0*  --  7.9*  --  7.4*  --  8.0*    < > = values in this interval not displayed.       INR  Recent Labs   Lab 12/16/22  1620   INR 1.58*            Jarrell Terry MD

## 2022-12-17 NOTE — PLAN OF CARE
2035: Pt arrived via transport team to [facility] ICU from [facility] ED after RN report received. Pt alert with 1st of 2 units PRBC's tranfusing, and 980ml serosanguinous output to new left chest tube, R chest tube previously in place with 20ml serosanguinous output; no additional output from the right.  Neuro: Pt able to mouth words; making appropriate requests. Appears A&Ox4. PERRL. Minimal independent ROM.   Pain: tender tracheostomy site; cleansed and foam applied. Abdomen discomfort to palpation/assessment, as well as bilateral chest tube insertion sites.   CV: NSR; VSS. Afebrile. 2 units PRBC's transfused.   Pulm: tolerating trach/vent PSV 8/5 30% FiO2. Lungs coarse to auscultation.   GI: G-tube clamped, Rx only, otherwise NPO overnight. Incontinent stool; brown with red blood, mixed upon admission. MD notified.   : HD patient who missed dialysis 12/16; no voids overnight  MS: multiple skin wounds/lesions. See flowsheet    LDA's: 6.0 Shiley cuffed  Gastric tube  L pleural chest tube  R pleural chest tube  R HD catheter  R PICC triple lumen  Gtts: Protonix and D5@ 50ml/hr for glucose maintenance.     DATE:  12/16/2022   TIME OF RECEIPT FROM LAB:  2250  LAB TEST:  ammonia  LAB VALUE:  113  RESULTS GIVEN WITH READ-BACK TO (PROVIDER):  Jarrell Terry MD by ynes GREEN, [facility]  TIME LAB VALUE REPORTED TO PROVIDER:   2250   *Pt's spouse Ofe updated and consented to blood products and all labs needed, including HIV, HBV and HBC with MD Terry and primary RN. Patient alert and agrees to Ofe assisting with consents needed.    Goal Outcome Evaluation: progressing  Elsa Hernandez RN

## 2022-12-17 NOTE — PROGRESS NOTES
Nursing note  Addendum done see below.  Right PICC tip in the mid SVC.   Addended by Vlad Maldonado MD on 12/17/2022  8:43 AM

## 2022-12-17 NOTE — PROGRESS NOTES
Nursing note  Pt readmitted with PICC, dressing was changed, initial VC 2.5 cm per documentation, and now it was 6 cm. Looking at the CXR that was done 12/16 it seems like the tip of the catheter is in the mid SVC. Pt primary nurse notified, and [facility] radiologist was also called to see if  Addendum can be done on the CXR that was obtained on 12/16/22. Will continue to follow up.Sandi Carias RN,VAT.

## 2022-12-17 NOTE — PROGRESS NOTES
Renal Medicine Progress Note            Assessment/Plan:     Blanco Osborne is a 57-year-old male with PMH CARRILLO s/p liver transplant (9/2016) and cirrhosis admitted 11/12/2022 with out of hospital PEA arrest and acute hypoxemic respiratory failure. Course complicated by parainfluenza virus, streptococcal bacteremia, and LORETTA requiring CRRT.     Discharged on 12/15.  Now readmitted with bloody stool and left-sided hemothorax -status post chest tube placement on left side        1.  Severe anuric LORETTA:               -CRRT stopped 11/22 and resume on 11/24 and stopped 11/26.               -IHD started 11/29/22                2.  s/p Septic shock with MODS:                -Resolved               - Midodrine 10 mg TID .     3.  Respiratory failure:  now trach'ed. bilateral chest tube for pneumo/hemothorax                   -Aspergillus PNA              4.  ESLD due to CARRILLO s/p liver transplant                - tacrolimus 1mg BID, dosing per GI     5.  Hypoalbuminemia      6. Anemia- s/p PRBC on 12/17 .  Blood loss anemia.  Hemoglobin 8.1 now.        Plan/Recs:  1) Continue MWF dialysis .  Dialysis today to make up for missed dialysis yesterday.  3 hours.  1 L ultrafiltration..  EPO 6000 units  Next dialysis on Monday.    Maria Fink MD  [facility]phrology   [phone]          Interval History:     Readmitted overnight for concern for bloody stool and left-sided hemothorax.  Underwent chest tube placement.  Has had brown stools since presentation.  Received 2 units of blood transfusion.  Reconsulted to provide routine dialysis while he is here.  Did not receive dialysis yesterday as planned.    Currently on vent.  Laying flat.  Afebrile.  Blood pressure slightly soft.  On D5 water pending reinitiation of tube feeds.         Medications and Allergies:       - MEDICATION INSTRUCTIONS for Dialysis Patients -   Does not apply See Admin Instructions     carboxymethylcellulose PF  1 drop Both Eyes  TID     chlorhexidine  15 mL Mouth/Throat Q12H     folic acid  1 mg Intravenous Daily     hydrocortisone sodium succinate PF  25 mg Intravenous Q6H     insulin aspart  1-6 Units Subcutaneous Q4H     ipratropium - albuterol 0.5 mg/2.5 mg/3 mL  3 mL Nebulization 4x daily     [Held by provider] lactulose  20 g Oral or Feeding Tube TID     midodrine  10 mg Oral or Feeding Tube TID w/meals     multivitamins w/minerals  15 mL Per Feeding Tube Daily     nystatin  500,000 Units Swish & Swallow 4x Daily     rifaximin  550 mg Per Feeding Tube BID     tacrolimus  1 mg Oral or Feeding Tube BID      No Known Allergies         Physical Exam:   Vitals were reviewed  BP 96/73   Pulse 66   Temp 97.5  F (36.4  C) (Oral)   Resp 15   Wt 88 kg (194 lb 0.1 oz)   SpO2 100%   BMI 26.31 kg/m      Wt Readings from Last 3 Encounters:   12/16/22 88 kg (194 lb 0.1 oz)   12/16/22 100.2 kg (221 lb)   12/16/22 100.2 kg (221 lb)       GENERAL APPEARANCE: awake, alert.  On vent  HEENT:  Trach present  RESP:  Few coarse sounds.  Chest tubes bilaterally  CV: tachy, regular  ABDOMEN mild  distension, non-tender  EXTREMITIES/SKIN: no c/c/rashes/lesions; no noted dependent edema  LINES:  Right tunneled dialysis catheter present               Data:     BMP  Recent Labs   Lab 12/17/22  1014 12/17/22  0518 12/17/22  0253 12/16/22  2239 12/16/22  2027 12/16/22  1620 12/16/22  1139 12/16/22  0932 12/16/22  0626 12/15/22  0809 12/15/22  0414   NA  --   --   --  135  --  136  --  135* 137  --  135   POTASSIUM  --   --   --  4.0  --  4.2  --  4.0 4.3  --  4.0   CHLORIDE  --   --   --  99  --  98  --   --  98  --  99   KELLY  --   --   --  8.0*  --  8.3*  --   --  8.1*  --  8.0*   CO2  --   --   --  26  --  26  --   --  26  --  30   BUN  --   --   --  114*  --  108*  --   --  96.6*  --  72*   CR  --   --   --  3.35*  --  3.59*  --   --  3.10*  --  2.39*   * 97 80 104*   < > 179*   < > 149* 167*   < > 136*    < > = values in this interval not displayed.      CBC  Recent Labs   Lab 12/17/22  1136 12/17/22  0519 12/16/22  2239 12/16/22  1620 12/16/22  1129 12/16/22  0932 12/16/22  0626 12/15/22  0414   WBC  --   --  3.9* 4.5  --   --  3.4* 3.7*   HGB 8.8* 8.1* 7.9*  7.9* 7.0* 7.0*   < > 6.6* 7.3*   HCT  --   --  25.7* 23.3* 23.1*  --  22.3* 24.7*   MCV  --   --  106* 107*  --   --  109* 109*   PLT  --   --  94* 111*  --   --  83* 78*    < > = values in this interval not displayed.     Lab Results   Component Value Date    AST 21 12/16/2022    ALT 20 12/16/2022    ALKPHOS 251 (H) 12/16/2022    BILITOTAL 0.9 12/16/2022    CHANDLER 113 (HH) 12/16/2022     Lab Results   Component Value Date    INR 1.14 12/16/2022       Attestation:  I have reviewed today's vital signs, notes, medications, labs and imaging.    Maria Fink MD  [facility] Nephrology

## 2022-12-17 NOTE — PROGRESS NOTES
Ammonia is elevated at 113. Mentation is stable.   - Will continue lactulose 30ml TID and Rifaximin BID for now.    Jarrell Terry MD.  12/16/2022  11:56 PM

## 2022-12-17 NOTE — ED NOTES
Patient update given to Patient's spouse Ofe, patient gave this writer permission to share information with her.

## 2022-12-17 NOTE — PROGRESS NOTES
[facility] Intensive Care Unit  History and Physical  December 17, 2022      Blanco Osborne MRN# [id number]   Age: 57 year old YOB: 1965     Date of Admission: 12/16/2022    Primary care provider: Ki Powers            Assessment and Plan:     Blanco Osborne is a 57 year old male admitted on 12/16/2022 for No chief complaint on file.  . My assessment and plan for this patient is as follows:    SUMMARY: Blanco Osborne is a 57 year old male with paroxysmal A. fib, Liver transplant (2016), CHRITSIAN on BiPAP, h/o CVA and hypertension. He is s/p Trach/PEG following acute resp failure and PEA due to Strep/para influenza pna. He had septic shock with MSOF and is currently on iHD. He had Rt sided Chest tube placed for hydroptx on 12/12/22. He was transferred to [place] on 12/14/22 for CIP. Was noted to have bloody stool on 12/15 and 12/16 and had left Ptx which required left sided chest tube leading to ~900ml of bloody output.   He was given 2uPRBC's and transferred to [facility].    Neurology and Psychiatry: Sedation and analgesia achieved by;  1. H/o Embolic CVA: Elliquis is currently held and will start heparin drip once GI bleed subsides  2. CIP: Continue PT/OT     Pulmonary: Lung protective ventilation strategy with a tidal volume of 6-8mL/kg of ideal body weight, head of the bed elevated at 30 degrees, and oral care.  1. H/o Strep/Parainfluenza pna leading to Acute resp failure: Failed extubation x2. S/p Trach on 11/29/22. Will try sequential pressure support trials today. Check VBG while on PSTs.   2. H/o CHRISTIAN was on BiPAP, currently Trach'ed.  3. New Left Ptx with acute on hypoxic respiratory failure and ? hemithorax: Etiology unclear. Pigtail placed on 12/16/2022 and significant decrease in volume of left pneumothorax, now measuring approximately 2 mm at the apex on recent CXR. Will check CT Chest today. Output is less bloody.   4. Hx of right hydropneumothorax: No output nor air leak. Will  consider removal after clamping if CT chest is reassuring.      Cardiovascular system:  1. H/o HTN  2. Afib: On Eliquis but on hold due to ? GI bl;eed    Renal/Electrolytes:  1. LORETTA: was on CRRT but now on iHD. He was on MWF schedule. Missed it today. No acute need noted. Still Anuric.    Plan:  - Consult Nephrology for iHD needs    ID:  1. H/o Strep/Parainfluenza infection: Completed treatment course  2. H/o Aspergillus: Was on Vori for <15days.  3. 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    Plan:  - No acute issues.  -  Pleural fluid cultures are negative so far    GI/:  1. GI bleed: Had a bloody stool overnight on 12/15. GI team suspicious of ? iatrogenic rectal ulcer  2. Ascites: S/p Tap on 12/1 - no evidence of infection/Malignancy  3. LIver tx: Cont Tac goal level of 5 -7 and solucortef. Ammonia> 100 but having loose stools, alert and no need to continue to check values     Plan:  - Appreciate GI rcommedations, no dire need for scope now  - Eliquis is being held and will plan to start heparin drip prior to transition to Elliquis after GI bleed subsides and pleural fluid is less bloody.  - cont rifaximin and hold lactulose due to very frequent loose stools  - cont midodrine    Endocrine:  1. DM:    Plan:  - Was on lantus 30u daily. Will hold or cut down by 50%.  - SSI    Nutrition:   - Hold TF due to possible GI bleed. Will evaluate and recommence when bleed subsides    Heme/Onc:  1.  Chronic Anemia: Likely due to chronic illness and GI bleed  2.  ChronicThrombocytopenia:    Plan:  - Will monitor counts closely and transfuse accordingly    ICU prophylaxis:      DVT; SCDs        VAP; As above     Restrain needed: No     Stress ulcer: IV PPI     Central line: Needed today for IV access/Meds.    Code Status: Full    Prognosis: Critical    Family update: Done at bedside by me.    Billing: total time spend providing critical care was 40 min, excluding the procedures.    Randy Newell MD  Pulmonary,  Critical Care and Sleep Medicine  [facility]  Pager: [phone]             Treatment goals for next 24 hours:   1. Continue to monitor blood counts  2. Chest tube management and CT chest today  3. Restarted graduated sequential PSTs         History of Present Illness:     Blanco Osborne is a 57 year old male with paroxysmal A. fib, Liver transplant (2016) on Tacrolimus and solucortef, suspected COPD, CHRISTIAN on BiPAP, h/o CVA and hypertension.  He was admitted on 11/22/22 for acute respiratory failure secondary to pneumonia (Strep and Para influenza). He had out of hospital cardiac arrest with prolonged resuscitation along with Cooling. He developed ARDS, septic shock with multiorgan failure, initially CRRT but now on iHD for LORETTA and Rt sided Ptx requiring Chest tube. Failed extubation x2.  Status post tracheostomy (12/8) and has Shiley prox XLT and PEG.  He was tolerating 14hrs of TD prior to discharge. He had Rt sided Chest tube placed for hydroptx on 12/12/22. He was transferred to [place] on 12/14/22 for CIP. Noted to have ascites status post paracentesis 12/1.  Cytology negative for malignancy.      Today in AM had CXR which showed new left Ptx. There was also concern for new GI bleed (Brick red stool overnight) and Hgb was down to 6, repeat was 7. Apixaban held and is being given 2u PRBC's. Pt was on Alb/mucomyst nebs and meta nebs too.  Post Left chest tube placement - pt had about 950ml of bloody fluid vs. Blood through chest tube.           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     NAFLD (nonalcoholic fatty liver disease) 2016     CHRISTIAN on CPAP      Parainfluenza type 1 infection 2022     SBP (spontaneous bacterial peritonitis) (H)     [facility]     Sepsis due to Streptococcus pneumoniae with acute hypoxic respiratory failure (H) 2022     Tubular adenoma 10/2019    Large cecal adenoma- due for surveillance colonoscopy in 3 years (10/2022)             Past Surgical History:     Past Surgical History:   Procedure Laterality Date     APPENDECTOMY       BENCH LIVER N/A 2016    Procedure: BENCH LIVER;  Surgeon: Enoc Crews MD;  Location: [facility] OR     COLONOSCOPY  13    repeat in 2018     COLONOSCOPY N/A 10/4/2019    Procedure: COLONOSCOPY, WITH POLYPECTOMY AND BIOPSY;  Surgeon: Go Chong MD;  Location: [facility] OR     HERNIA REPAIR       IR CHEST TUBE PLACEMENT NON-TUNNELED RIGHT  2022     IR CVC TUNNEL PLACEMENT > 5 YRS OF AGE  2022     IR GASTROSTOMY TUBE PERCUTANEOUS PLCMNT  2022     IR PARACENTESIS  2022     IR PARACENTESIS  2022     IR THORACENTESIS  2022     IR TRANSCATHETER BIOPSY  2022     TRACHEOSTOMY N/A 2022    Procedure: TRACHEOSTOMY;  Surgeon: Nilesh Jackson MD;  Location:  OR     TRANSPLANT LIVER RECIPIENT  DONOR N/A 2016    Procedure: TRANSPLANT LIVER RECIPIENT  DONOR;  Surgeon: Enoc Crews MD;  Location: [facility] OR             Social History:     Social History     Socioeconomic History     Marital status:      Spouse name: Not on file     Number of children: Not on file     Years of education: Not on file     Highest education level: Not on file   Occupational History     Not on file   Tobacco Use     Smoking status: Never     Smokeless tobacco: Never   Substance and  Sexual Activity     Alcohol use: Not Currently     Alcohol/week: 0.0 standard drinks     Drug use: No     Sexual activity: Not on file   Other Topics Concern     Parent/sibling w/ CABG, MI or angioplasty before 65F 55M? Not Asked   Social History Narrative     Not on file     Social Determinants of Health     Financial Resource Strain: Not on file   Food Insecurity: Not on file   Transportation Needs: Not on file   Physical Activity: Not on file   Stress: Not on file   Social Connections: Not on file   Intimate Partner Violence: Not on file   Housing Stability: Not on file         Smoking:   Social History     Tobacco Use     Smoking status: Never     Smokeless tobacco: Never   Substance Use Topics     Alcohol use: Not Currently     Alcohol/week: 0.0 standard drinks     Drug use: No              Family History:   No family history on file.             Medications:     Current Facility-Administered Medications   Medication     - MEDICATION INSTRUCTIONS for Dialysis Patients -     albuterol (PROVENTIL) neb solution 2.5 mg     carboxymethylcellulose PF (REFRESH PLUS) 0.5 % ophthalmic solution 1 drop     chlorhexidine (PERIDEX) 0.12 % solution 15 mL     dextrose 5% infusion     glucose gel 15-30 g    Or     dextrose 50 % injection 25-50 mL    Or     glucagon injection 1 mg     folic acid injection 1 mg     hydrocortisone sodium succinate PF (solu-CORTEF) injection 25 mg     insulin aspart (NovoLOG) injection (RAPID ACTING)     ipratropium - albuterol 0.5 mg/2.5 mg/3 mL (DUONEB) neb solution 3 mL     [Held by provider] lactulose (CHRONULAC) solution 20 g     midodrine (PROAMATINE) tablet 10 mg     multivitamins w/minerals liquid 15 mL     naloxone (NARCAN) injection 0.2 mg    Or     naloxone (NARCAN) injection 0.4 mg    Or     naloxone (NARCAN) injection 0.2 mg    Or     naloxone (NARCAN) injection 0.4 mg     nystatin (MYCOSTATIN) suspension 500,000 Units     ondansetron (ZOFRAN ODT) ODT tab 4 mg     oxyCODONE  (ROXICODONE) tablet 5 mg     pantoprazole (PROTONIX) 80 mg in sodium chloride 0.9 % 100 mL infusion     rifaximin (XIFAXAN) tablet 550 mg     tacrolimus (GENERIC EQUIVALENT) suspension 1 mg               Review of Systems:   Unable to get due to trach status         Physical Examination:   General: Alert, oriented, not in distress. S/p Trach  Vitals: /67   Pulse 69   Temp 97.5  F (36.4  C) (Oral)   Resp 13   Wt 88 kg (194 lb 0.1 oz)   SpO2 100%   BMI 26.31 kg/m     SpO2: 100 %    HEENT: neck supple, symmetrical, no lymph node enlargement, thyromegaly, bruit, JVD, pupils are isocoric and equally responsive to the light. Mallampati score, Grade III  Trach +. Healing lip lesions.  Lungs: both hemithoraces are symmetrical, normal to palpation, no dullness to percussion, auscultation of lungs revealed bilateral coarse crackles. Rubén chest tubes in place  CVS: Normal S1 and S2, no additional heart sounds, murmur, rub, normal peripheral pulses  Abdomen: Bowel sounds present, soft, Diffusely mildly tender, mildly distended, no organomegaly, ascitis, mass  Extremities/musculoskeletal: no peripheral edema, deformity, cyanosis, clubbing  Neurology: alert, orientedx3, no motor/sensorial deficits, cranial nerves grossly normal  Skin: no rash  Psychiatry: Mood and affect are appropriate       Exam of Line sites: No erythema or discharge.          Labs:     CBC RESULTS:     Recent Labs   Lab 12/17/22  0519 12/16/22  2239 12/16/22  1620 12/16/22  1129 12/16/22  0932 12/16/22  0626 12/15/22  0414 12/14/22  0412 12/13/22  0427   WBC  --  3.9* 4.5  --   --  3.4* 3.7* 2.8* 4.6   RBC  --  2.43* 2.18*  --   --  2.05* 2.26* 2.11* 1.95*   HGB 8.1* 7.9*  7.9* 7.0* 7.0* 7.4* 6.6* 7.3* 7.0* 6.5*   HCT  --  25.7* 23.3* 23.1*  --  22.3* 24.7* 23.1* 22.0*   PLT  --  94* 111*  --   --  83* 78* 58* 63*       Basic Metabolic Panel:  Recent Labs   Lab 12/17/22  1014 12/17/22  0518 12/17/22  0253 12/16/22  2239 12/16/22 2027  12/16/22  1620 12/16/22  1139 12/16/22  0932 12/16/22  0626 12/15/22  0809 12/15/22  0414 12/14/22  0944 12/14/22 0412 12/13/22  0748 12/13/22  0427   NA  --   --   --  135  --  136  --  135* 137  --  135  --  136  --  136   POTASSIUM  --   --   --  4.0  --  4.2  --  4.0 4.3  --  4.0  --  4.1  --  3.8   CHLORIDE  --   --   --  99  --  98  --   --  98  --  99  --  99  --  101   CO2  --   --   --  26  --  26  --   --  26  --  30  --  28  --  29   BUN  --   --   --  114*  --  108*  --   --  96.6*  --  72*  --  96*  --  72*   CR  --   --   --  3.35*  --  3.59*  --   --  3.10*  --  2.39*  --  3.36*  --  2.55*   * 97 80 104* 122* 179*   < > 149* 167*   < > 136*   < > 196*   < > 222*  210*   KELLY  --   --   --  8.0*  --  8.3*  --   --  8.1*  --  8.0*  --  7.9*  --  7.4*    < > = values in this interval not displayed.       INR  Recent Labs   Lab 12/16/22 2239 12/16/22  1620   INR 1.14 1.58*          Randy Newell MD  Pulmonary, Critical Care and Sleep Medicine  [facility]  Pager: [phone]

## 2022-12-17 NOTE — PROGRESS NOTES
Potassium   Date Value Ref Range Status   12/16/2022 4.0 3.4 - 5.3 mmol/L Final   04/20/2020 3.9 3.4 - 5.3 mmol/L Final     Hemoglobin   Date Value Ref Range Status   12/17/2022 8.8 (L) 13.3 - 17.7 g/dL Final   04/20/2020 14.3 13.3 - 17.7 g/dL Final     Creatinine   Date Value Ref Range Status   12/16/2022 3.35 (H) 0.66 - 1.25 mg/dL Final   04/20/2020 1.06 0.66 - 1.25 mg/dL Final     Urea Nitrogen   Date Value Ref Range Status   12/16/2022 114 (H) 7 - 30 mg/dL Final   04/20/2020 23 7 - 30 mg/dL Final     Sodium   Date Value Ref Range Status   12/16/2022 135 133 - 144 mmol/L Final   04/20/2020 139 133 - 144 mmol/L Final     INR   Date Value Ref Range Status   12/16/2022 1.14 0.85 - 1.15 Final   10/07/2019 1.20 (H) 0.86 - 1.14 Final       DIALYSIS PROCEDURE NOTE  Hepatitis status of previous patient on machine log was checked and verified ok to use with this patients hepatitis status.  Patient dialyzed for 3 hrs. on a K3 bath with a net fluid removal of  0.5L.    A BFR of 350 ml/min was obtained via a right CVC.      The treatment plan was discussed with Dr. Fink during the treatment.    Total heparin received during the treatment: 0 units.   Line flushed, clamped and capped with heparin 1:1000 1.9 mL (1900 units) per lumen    Meds  given: epogen   Complications: SBP dropped in to the 80's about USP through treatment; UF paused, primary RN gave scheduled midodrine and SBP rebounded to >100 for remainder of treatment. UF resumed at 0.5L goal.      Person educated: patient. Knowledge base minimal. Barriers to learning: pt is non-verbal/trached. Educated on procedure via verbal mode. patient/family nods in understanding. Pt prefers verbal education style.     ICEBOAT? Timeout performed pre-treatment  I: Patient was identified using 2 identifiers  C:  Consent Signed Yes  E: Equipment preventative maintenance is current and dialysis delivery system OK to use  B:    Latest Reference Range & Units 12/10/22 20:22   Hep B  Surface Agn Nonreactive  Nonreactive   Hepatitis B Surface Antibody Instrument Value <8.00 m[IU]/mL 0.00   Hepatitis B Surface Antibody  Nonreactive     O: Dialysis orders present and complete prior to treatment  A: Vascular access verified and assessed prior to treatment  T: Treatment was performed at a clinically appropriate time  ?: Patient was allowed to ask questions and address concerns prior to treatment  See Adult Hemodialysis flowsheet in EPIC for further details and post assessment.  Machine water alarm in place and functioning. Transducer pods intact and checked every 15min.   Pt dialyzed at the bedside in ICU room 370..  Chlorine/Chloramine water system checked every 4 hours.  Outpatient Dialysis at [facility]. MWF schedule in the hospital.    Patient repositioned every 2 hours during the treatment.  Post treatment report given to ANIL Pickard RN regarding 0.5L of fluid removed, last BP of 115/73, and patient pain rating of 0/10.

## 2022-12-17 NOTE — PROGRESS NOTES
[facility] ICU RESPIRATORY NOTE        Date of Admission: 12/16/2022    Date of Intubation (most recent): Chronic Trach    Reason for Mechanical Ventilation: Resp Failure    Met Criteria for Spontaneous Breathing Trial: Yes - PS 8/5 for most of day; vt's decreasing, RR increasing with no changed after increase in PS.  Pt placed on CMV at 1200 to rest.    Significant Events Today: None    ABG Results:   Recent Labs   Lab 12/17/22  1136 12/10/22  2022   O2PER 30 30       Current Vent Settings: Vent Mode: CMV/AC  (Continuous Mandatory Ventilation/ Assist Control)  FiO2 (%): 30 %  Resp Rate (Set): 16 breaths/min  Tidal Volume (Set, mL): 450 mL  PEEP (cm H2O): 5 cmH2O  Pressure Support (cm H2O): 15 cmH2O  Resp: 16 (Simultaneous filing. User may not have seen previous data.)      Skin Assessment: Blanchable redness around trach, trach site is oozing secretions.    Plan: PS trials as tolerated.    Erica Garcia RT on 12/17/2022 at 4:19 PM

## 2022-12-17 NOTE — CONSULTS
[facility] - Digestive Health Consultation     Blanco Osbonre  [address]  [place] [address]  57 year old male     Admission Date/Time: 12/16/2022  Primary Care Provider: Ki Powers  Referring / Attending Physician:  Harrison     We were asked to see the patient in consultation by Dr. Terry for evaluation of GI bleed.    ASSESSMENT:    57M s/p liver tx 2016, s/p trach/PEG following respiratory failure and PEA due to strep/parainfluenza pna complicated by MSOF requiring HD, Rt chest tube hydroptx, d/cd to [place] 12/14 after prolonged hospitalization, but overnight developed bloody stool and Lt sided hemothorax, s/p chest tube.      Since admit, has apparently passed 1-2 brwn stools    RECOMMENDATIONS:  Agree with holding eliquis for now.   Hematochezia could be related to iatrogenic rectal ulcer, ie rectal tube with his prolonged hospitalization, less likely UGIB.  No active bleeding presently, but would have low threshold to proceed with EGD/flex sig if there is clinically significant bleeding.    Thank you for involving us in this patient's care.  Please call me with any questions.    Total time spent in chart review, direct medical discussion, examination, and documentation was 40 minutes    Rashawn Chu DO   [facility] - Dig[facility]  Cell [phone]    ________________________________________________________________________        CC: bleeding     HPI:  Blanco Osborne is a 57 year old male who we are asked to see for hematochezia, developing 12/16 while at [place] x1.  Is vent dep with trach/PEG from recent hospitalized 11/12-12/15 with respiratory failure and PEA due to strep/parainfluenza pna, possible aspergillus, complicated by MSOF requiring HD, Hgb 7-8 on epogen.     Presently, the patient denies abd pain.  Passed at least 1 bwn stool last night per nursing.  Reviewed recent events, plan with ICU MD.    Repeat liver bx on previous admit 12/1/22 demonstrated evidence of cirrhosis  with regenerating nodules, however the sample was autolyzed from delayed fixation, rejection not clearly ruled out.  Tacro level being followed by transplant team at [facility].      ROS: A comprehensive ten point review of systems was negative aside from those in mentioned in the HPI.      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     Priority: High     Acquired immunocompromised state (H) 11/19/2022     Priority: High     Sepsis due to Streptococcus pneumoniae with acute hypoxic respiratory failure (H) 11/19/2022     Priority: High     Encounter for therapeutic drug monitoring 11/19/2022     Priority: High     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     Priority: High     Pneumothorax on left 12/16/2022     Priority: Medium     Acute on chronic respiratory failure after trauma (H) 12/15/2022     Priority: Medium     Critical illness myopathy 12/04/2022     Priority: Medium     Hyperammonemia (H) 11/29/2022     Priority: Medium     Pneumothorax 11/29/2022     Priority: Medium     Embolic stroke (H) 11/20/2022     Priority: Medium     Respiratory arrest (H) 11/12/2022     Priority: Medium     Lactic acidosis 11/12/2022     Priority: Medium     Respiratory acidosis 11/12/2022     Priority: Medium     Acute renal failure, unspecified acute renal failure type (H) 11/12/2022     Priority: Medium     Immunosuppression (H) 09/28/2016     Priority: Medium     Other ascites 09/28/2016     Priority: Medium     Postoperative ileus (H) 09/28/2016     Priority: Medium     Acute kidney injury (H) 09/28/2016     Priority: Medium     Decreased flow of hepatic artery during surgery 09/28/2016     Priority: Medium     Liver transplanted (H) 09/21/2016     Priority: Medium     Trauma 09/21/2016     Priority: Medium     Liver transplant candidate 09/20/2016     Priority: Medium     Cirrhosis  of liver (H) 02/11/2016     Priority: Medium     NAFLD (nonalcoholic fatty liver disease) 02/11/2016     Priority: Medium     SBP (spontaneous bacterial peritonitis) (H) 11/11/2015     Priority: Medium     Coagulopathy (H) 11/09/2015     Priority: Medium     Leukocytosis 11/09/2015     Priority: Medium     Pleural effusion 11/09/2015     Priority: Medium     Hyperlipidemia 05/13/2015     Priority: Medium     Hypertension 05/13/2015     Priority: Medium     SOCIAL HISTORY:  Social History     Tobacco Use     Smoking status: Never     Smokeless tobacco: Never   Substance Use Topics     Alcohol use: Not Currently     Alcohol/week: 0.0 standard drinks     Drug use: No     FAMILY HISTORY:  No family history on file.  ALLERGIES: No Known Allergies  MEDICATIONS:   Current Facility-Administered Medications   Medication     - MEDICATION INSTRUCTIONS for Dialysis Patients -     albuterol (PROVENTIL) neb solution 2.5 mg     carboxymethylcellulose PF (REFRESH PLUS) 0.5 % ophthalmic solution 1 drop     chlorhexidine (PERIDEX) 0.12 % solution 15 mL     dextrose 5% infusion     glucose gel 15-30 g    Or     dextrose 50 % injection 25-50 mL    Or     glucagon injection 1 mg     folic acid injection 1 mg     hydrocortisone sodium succinate PF (solu-CORTEF) injection 25 mg     insulin aspart (NovoLOG) injection (RAPID ACTING)     ipratropium - albuterol 0.5 mg/2.5 mg/3 mL (DUONEB) neb solution 3 mL     lactulose (CHRONULAC) solution 20 g     midodrine (PROAMATINE) tablet 10 mg     multivitamins w/minerals liquid 15 mL     naloxone (NARCAN) injection 0.2 mg    Or     naloxone (NARCAN) injection 0.4 mg    Or     naloxone (NARCAN) injection 0.2 mg    Or     naloxone (NARCAN) injection 0.4 mg     nystatin (MYCOSTATIN) suspension 500,000 Units     ondansetron (ZOFRAN ODT) ODT tab 4 mg     oxyCODONE (ROXICODONE) tablet 5 mg     pantoprazole (PROTONIX) 80 mg in sodium chloride 0.9 % 100 mL infusion     rifaximin (XIFAXAN) tablet 550 mg      tacrolimus (GENERIC EQUIVALENT) suspension 1 mg     PHYSICAL EXAM:   BP 95/64   Pulse 69   Temp 97.5  F (36.4  C) (Oral)   Resp 15   Wt 88 kg (194 lb 0.1 oz)   SpO2 100%   BMI 26.31 kg/m     GEN: Communicative, in NAD.  LEONEL: AT, anicteric, OP without erythema, exudate, or ulcers.    NECK: Supple.    LYMPH: No LAD noted.  HRT: Reg  ABD: ND, +BS, appears uncomfortable with palpation of the LUQ, no rebound  MSKL: sarcopenia     ADDITIONAL DATA:   I reviewed the patient's new clinical lab test results.   Recent Labs   Lab Test 12/17/22  0519 12/16/22 2239 12/16/22  1620 12/16/22  0932 12/16/22  0626 12/06/22  1448 12/06/22  0513   WBC  --  3.9* 4.5  --  3.4*   < > 8.0   HGB 8.1* 7.9*  7.9* 7.0*   < > 6.6*   < > 7.8*   MCV  --  106* 107*  --  109*   < > 113*   PLT  --  94* 111*  --  83*   < > 76*   INR  --  1.14 1.58*  --   --   --  1.16*    < > = values in this interval not displayed.     Recent Labs   Lab Test 12/16/22 2239 12/16/22 1620 12/16/22  0932 12/16/22  0626   POTASSIUM 4.0 4.2 4.0 4.3   CHLORIDE 99 98  --  98   CO2 26 26  --  26   * 108*  --  96.6*   ANIONGAP 10 12  --  13     Recent Labs   Lab Test 12/16/22 2239 12/16/22  0626 12/15/22  0414 12/13/22  0427 12/12/22  0431 11/25/22  1947 11/25/22  1859 07/10/18  0720 10/31/17  1038 09/25/16  0611 09/24/16  1055 09/23/16  0720 09/22/16  0741 09/21/16  0034 09/20/16  1259   ALBUMIN 1.7* 2.2* 1.8*   < > 1.6*   < >  --    < >  --    < >  --    < > 2.8*   < > 3.8   BILITOTAL 0.9 0.6  --   --  0.8   < >  --    < >  --    < >  --    < > 2.3*   < > 6.8*   ALT 20 16  --   --  15   < >  --    < >  --    < >  --    < > 726*   < > 26   AST 21 22  --   --  21   < >  --    < >  --    < >  --    < > 570*   < > 32   PROTEIN  --   --   --   --   --   --  70*  --  Negative  --  Negative   < >  --   --   --    LIPASE  --   --   --   --   --   --   --   --   --   --   --   --  63*  --   --    AMYLASE  --   --   --   --   --   --   --   --   --   --   --   --  72   --  86    < > = values in this interval not displayed.        I reviewed the patient's new imaging results.

## 2022-12-18 LAB
AMMONIA PLAS-SCNC: 38 UMOL/L (ref 10–50)
GLUCOSE BLDC GLUCOMTR-MCNC: 110 MG/DL (ref 70–99)
GLUCOSE BLDC GLUCOMTR-MCNC: 110 MG/DL (ref 70–99)
GLUCOSE BLDC GLUCOMTR-MCNC: 115 MG/DL (ref 70–99)
GLUCOSE BLDC GLUCOMTR-MCNC: 122 MG/DL (ref 70–99)
GLUCOSE BLDC GLUCOMTR-MCNC: 124 MG/DL (ref 70–99)
GLUCOSE BLDC GLUCOMTR-MCNC: 146 MG/DL (ref 70–99)
HCV RNA SERPL NAA+PROBE-ACNC: NOT DETECTED IU/ML
HGB BLD-MCNC: 7.2 G/DL (ref 13.3–17.7)
HGB BLD-MCNC: 7.4 G/DL (ref 13.3–17.7)
HGB BLD-MCNC: 7.6 G/DL (ref 13.3–17.7)
HGB BLD-MCNC: 7.8 G/DL (ref 13.3–17.7)
HGB BLD-MCNC: 7.9 G/DL (ref 13.3–17.7)

## 2022-12-18 PROCEDURE — 94640 AIRWAY INHALATION TREATMENT: CPT | Mod: 76

## 2022-12-18 PROCEDURE — 999N000157 HC STATISTIC RCP TIME EA 10 MIN

## 2022-12-18 PROCEDURE — 250N000012 HC RX MED GY IP 250 OP 636 PS 637: Performed by: INTERNAL MEDICINE

## 2022-12-18 PROCEDURE — 250N000009 HC RX 250: Performed by: INTERNAL MEDICINE

## 2022-12-18 PROCEDURE — 85018 HEMOGLOBIN: CPT | Performed by: SURGERY

## 2022-12-18 PROCEDURE — 94640 AIRWAY INHALATION TREATMENT: CPT

## 2022-12-18 PROCEDURE — 258N000003 HC RX IP 258 OP 636: Performed by: INTERNAL MEDICINE

## 2022-12-18 PROCEDURE — 250N000011 HC RX IP 250 OP 636: Performed by: INTERNAL MEDICINE

## 2022-12-18 PROCEDURE — C9113 INJ PANTOPRAZOLE SODIUM, VIA: HCPCS | Performed by: INTERNAL MEDICINE

## 2022-12-18 PROCEDURE — 99291 CRITICAL CARE FIRST HOUR: CPT | Performed by: INTERNAL MEDICINE

## 2022-12-18 PROCEDURE — 200N000001 HC R&B ICU

## 2022-12-18 PROCEDURE — 250N000013 HC RX MED GY IP 250 OP 250 PS 637: Performed by: INTERNAL MEDICINE

## 2022-12-18 PROCEDURE — 250N000011 HC RX IP 250 OP 636: Performed by: SURGERY

## 2022-12-18 PROCEDURE — 85018 HEMOGLOBIN: CPT | Performed by: INTERNAL MEDICINE

## 2022-12-18 PROCEDURE — 82140 ASSAY OF AMMONIA: CPT | Performed by: INTERNAL MEDICINE

## 2022-12-18 PROCEDURE — P9047 ALBUMIN (HUMAN), 25%, 50ML: HCPCS | Performed by: SURGERY

## 2022-12-18 PROCEDURE — 99232 SBSQ HOSP IP/OBS MODERATE 35: CPT | Performed by: INTERNAL MEDICINE

## 2022-12-18 PROCEDURE — 94003 VENT MGMT INPAT SUBQ DAY: CPT

## 2022-12-18 RX ORDER — ALBUMIN (HUMAN) 12.5 G/50ML
SOLUTION INTRAVENOUS
Status: DISPENSED
Start: 2022-12-18 | End: 2022-12-18

## 2022-12-18 RX ORDER — ALBUMIN (HUMAN) 12.5 G/50ML
50 SOLUTION INTRAVENOUS ONCE
Status: COMPLETED | OUTPATIENT
Start: 2022-12-18 | End: 2022-12-18

## 2022-12-18 RX ORDER — HYDROMORPHONE HCL IN WATER/PF 6 MG/30 ML
0.2 PATIENT CONTROLLED ANALGESIA SYRINGE INTRAVENOUS ONCE
Status: COMPLETED | OUTPATIENT
Start: 2022-12-18 | End: 2022-12-18

## 2022-12-18 RX ADMIN — NYSTATIN 500000 UNITS: 100000 SUSPENSION ORAL at 12:13

## 2022-12-18 RX ADMIN — DEXTROSE MONOHYDRATE: 50 INJECTION, SOLUTION INTRAVENOUS at 05:21

## 2022-12-18 RX ADMIN — TACROLIMUS 1 MG: 5 CAPSULE ORAL at 20:47

## 2022-12-18 RX ADMIN — TACROLIMUS 1 MG: 5 CAPSULE ORAL at 10:25

## 2022-12-18 RX ADMIN — ALBUMIN HUMAN 50 G: 0.25 SOLUTION INTRAVENOUS at 01:24

## 2022-12-18 RX ADMIN — HYDROCORTISONE SODIUM SUCCINATE 25 MG: 100 INJECTION, POWDER, FOR SOLUTION INTRAMUSCULAR; INTRAVENOUS at 09:50

## 2022-12-18 RX ADMIN — OXYCODONE HYDROCHLORIDE 5 MG: 5 TABLET ORAL at 16:18

## 2022-12-18 RX ADMIN — IPRATROPIUM BROMIDE AND ALBUTEROL SULFATE 3 ML: .5; 3 SOLUTION RESPIRATORY (INHALATION) at 19:21

## 2022-12-18 RX ADMIN — DEXTROSE MONOHYDRATE: 50 INJECTION, SOLUTION INTRAVENOUS at 15:27

## 2022-12-18 RX ADMIN — MIDODRINE HYDROCHLORIDE 10 MG: 5 TABLET ORAL at 09:50

## 2022-12-18 RX ADMIN — MULTIVITAMIN 15 ML: LIQUID ORAL at 09:51

## 2022-12-18 RX ADMIN — OXYCODONE HYDROCHLORIDE 5 MG: 5 TABLET ORAL at 20:14

## 2022-12-18 RX ADMIN — HYDROCORTISONE SODIUM SUCCINATE 25 MG: 100 INJECTION, POWDER, FOR SOLUTION INTRAMUSCULAR; INTRAVENOUS at 12:12

## 2022-12-18 RX ADMIN — DEXTROSE MONOHYDRATE: 50 INJECTION, SOLUTION INTRAVENOUS at 00:03

## 2022-12-18 RX ADMIN — HYDROCORTISONE SODIUM SUCCINATE 25 MG: 100 INJECTION, POWDER, FOR SOLUTION INTRAMUSCULAR; INTRAVENOUS at 00:31

## 2022-12-18 RX ADMIN — HYDROMORPHONE HYDROCHLORIDE 0.2 MG: 0.2 INJECTION, SOLUTION INTRAMUSCULAR; INTRAVENOUS; SUBCUTANEOUS at 19:33

## 2022-12-18 RX ADMIN — Medication 1 DROP: at 16:18

## 2022-12-18 RX ADMIN — CHLORHEXIDINE GLUCONATE 0.12% ORAL RINSE 15 ML: 1.2 LIQUID ORAL at 09:50

## 2022-12-18 RX ADMIN — SODIUM CHLORIDE 8 MG/HR: 9 INJECTION, SOLUTION INTRAVENOUS at 17:38

## 2022-12-18 RX ADMIN — HYDROCORTISONE SODIUM SUCCINATE 25 MG: 100 INJECTION, POWDER, FOR SOLUTION INTRAMUSCULAR; INTRAVENOUS at 18:10

## 2022-12-18 RX ADMIN — Medication 1 DROP: at 09:51

## 2022-12-18 RX ADMIN — RIFAXIMIN 550 MG: 550 TABLET ORAL at 09:51

## 2022-12-18 RX ADMIN — IPRATROPIUM BROMIDE AND ALBUTEROL SULFATE 3 ML: .5; 3 SOLUTION RESPIRATORY (INHALATION) at 08:33

## 2022-12-18 RX ADMIN — OXYCODONE HYDROCHLORIDE 5 MG: 5 TABLET ORAL at 05:12

## 2022-12-18 RX ADMIN — NYSTATIN 500000 UNITS: 100000 SUSPENSION ORAL at 09:52

## 2022-12-18 RX ADMIN — FOLIC ACID 1 MG: 5 INJECTION, SOLUTION INTRAMUSCULAR; INTRAVENOUS; SUBCUTANEOUS at 10:25

## 2022-12-18 RX ADMIN — NYSTATIN 500000 UNITS: 100000 SUSPENSION ORAL at 21:06

## 2022-12-18 RX ADMIN — OXYCODONE HYDROCHLORIDE 5 MG: 5 TABLET ORAL at 09:51

## 2022-12-18 RX ADMIN — IPRATROPIUM BROMIDE AND ALBUTEROL SULFATE 3 ML: .5; 3 SOLUTION RESPIRATORY (INHALATION) at 11:00

## 2022-12-18 RX ADMIN — MIDODRINE HYDROCHLORIDE 10 MG: 5 TABLET ORAL at 18:10

## 2022-12-18 RX ADMIN — RIFAXIMIN 550 MG: 550 TABLET ORAL at 20:15

## 2022-12-18 RX ADMIN — MIDODRINE HYDROCHLORIDE 10 MG: 5 TABLET ORAL at 12:13

## 2022-12-18 RX ADMIN — INSULIN ASPART 1 UNITS: 100 INJECTION, SOLUTION INTRAVENOUS; SUBCUTANEOUS at 20:46

## 2022-12-18 RX ADMIN — IPRATROPIUM BROMIDE AND ALBUTEROL SULFATE 3 ML: .5; 3 SOLUTION RESPIRATORY (INHALATION) at 15:22

## 2022-12-18 RX ADMIN — SODIUM CHLORIDE 8 MG/HR: 9 INJECTION, SOLUTION INTRAVENOUS at 05:13

## 2022-12-18 RX ADMIN — NYSTATIN 500000 UNITS: 100000 SUSPENSION ORAL at 18:10

## 2022-12-18 RX ADMIN — CHLORHEXIDINE GLUCONATE 0.12% ORAL RINSE 15 ML: 1.2 LIQUID ORAL at 20:26

## 2022-12-18 ASSESSMENT — ACTIVITIES OF DAILY LIVING (ADL)
ADLS_ACUITY_SCORE: 49
ADLS_ACUITY_SCORE: 45
ADLS_ACUITY_SCORE: 51
ADLS_ACUITY_SCORE: 45
ADLS_ACUITY_SCORE: 51
ADLS_ACUITY_SCORE: 45
ADLS_ACUITY_SCORE: 45
ADLS_ACUITY_SCORE: 51

## 2022-12-18 NOTE — PROGRESS NOTES
Renal Medicine Progress Note            Assessment/Plan:     Blanco Osborne is a 57-year-old male with PMH CARRILLO s/p liver transplant (9/2016) and cirrhosis admitted 11/12/2022 with out of hospital PEA arrest and acute hypoxemic respiratory failure. Course complicated by parainfluenza virus, streptococcal bacteremia, and LORETTA requiring CRRT.     Discharged on 12/15.  Now readmitted with bloody stool and left-sided hemothorax -status post chest tube placement on left side        1.  Severe anuric LORETTA:               -CRRT stopped 11/26.               -IHD started 11/29/22                2.  s/p Septic shock with MODS:                -Resolved               - Midodrine 10 mg TID .     3.  Respiratory failure:  now trach'ed. bilateral chest tube for pneumo/hemothorax                   -Aspergillus PNA              4.  ESLD due to CARRILLO s/p liver transplant                - tacrolimus 1mg BID, dosing per GI     5.  Hypoalbuminemia      6. Anemia- s/p PRBC on 12/17 .  Blood loss anemia.  Hemoglobin 7.9 now.        Plan/Recs:  1) Continue MWF dialysis .  Dialysed yesterday   to make up for missed dialysis .    Next - tomorrow. 3 hours.  1-1.5  L ultrafiltration ( getting about 1.2 L in D5W) ..  EPO 10K units      Maria Fink MD  [facility] - Nephrology   [phone]          Interval History:     Dialyzed yesterday, 0.5 L UF. No issues.   Hgb down to 7.2  Vitals stable  Afebrile.   Trach'ed and vent'ed    ROS -could not be obtained.  Patient is on vent.  Afebrile.  Bilateral chest tube draining okay.         Medications and Allergies:       - MEDICATION INSTRUCTIONS for Dialysis Patients -   Does not apply See Admin Instructions     carboxymethylcellulose PF  1 drop Both Eyes TID     chlorhexidine  15 mL Mouth/Throat Q12H     folic acid  1 mg Intravenous Daily     hydrocortisone sodium succinate PF  25 mg Intravenous Q6H     insulin aspart  1-6 Units Subcutaneous Q4H     ipratropium - albuterol 0.5 mg/2.5 mg/3  mL  3 mL Nebulization 4x daily     [Held by provider] lactulose  20 g Oral or Feeding Tube TID     midodrine  10 mg Oral or Feeding Tube TID w/meals     multivitamins w/minerals  15 mL Per Feeding Tube Daily     nystatin  500,000 Units Swish & Swallow 4x Daily     rifaximin  550 mg Per Feeding Tube BID     tacrolimus  1 mg Oral or Feeding Tube BID      No Known Allergies         Physical Exam:   Vitals were reviewed  /65   Pulse 68   Temp 97.6  F (36.4  C) (Oral)   Resp 12   Wt 88 kg (194 lb 0.1 oz)   SpO2 100%   BMI 26.31 kg/m      Wt Readings from Last 3 Encounters:   12/16/22 88 kg (194 lb 0.1 oz)   12/16/22 100.2 kg (221 lb)   12/16/22 100.2 kg (221 lb)       GENERAL APPEARANCE: awake, alert.  On vent  HEENT:  Trach present  RESP:  Few coarse sounds.  Chest tubes bilaterally  CV: tachy, regular  ABDOMEN mild  distension, non-tender  EXTREMITIES/SKIN: no c/c/rashes/lesions; no noted dependent edema  LINES:  Right tunneled dialysis catheter present               Data:     BMP  Recent Labs   Lab 12/18/22  1014 12/18/22  0605 12/18/22  0013 12/17/22  2105 12/17/22  0253 12/16/22  2239 12/16/22  2027 12/16/22  1620 12/16/22  1139 12/16/22  0932 12/16/22  0626 12/15/22  0809 12/15/22  0414   NA  --   --   --   --   --  135  --  136  --  135* 137  --  135   POTASSIUM  --   --   --   --   --  4.0  --  4.2  --  4.0 4.3  --  4.0   CHLORIDE  --   --   --   --   --  99  --  98  --   --  98  --  99   KELLY  --   --   --   --   --  8.0*  --  8.3*  --   --  8.1*  --  8.0*   CO2  --   --   --   --   --  26  --  26  --   --  26  --  30   BUN  --   --   --   --   --  114*  --  108*  --   --  96.6*  --  72*   CR  --   --   --   --   --  3.35*  --  3.59*  --   --  3.10*  --  2.39*   * 115* 110* 102*   < > 104*   < > 179*   < > 149* 167*   < > 136*    < > = values in this interval not displayed.     CBC  Recent Labs   Lab 12/18/22  0603 12/18/22  0215 12/18/22  0013 12/17/22  1844 12/17/22  0519 12/16/22  2234  12/16/22  1620 12/16/22  1129 12/16/22  0932 12/16/22  0626 12/15/22  0414   WBC  --   --   --   --   --  3.9* 4.5  --   --  3.4* 3.7*   HGB 7.2* 7.6* 7.8* 9.3*   < > 7.9*  7.9* 7.0* 7.0*   < > 6.6* 7.3*   HCT  --   --   --   --   --  25.7* 23.3* 23.1*  --  22.3* 24.7*   MCV  --   --   --   --   --  106* 107*  --   --  109* 109*   PLT  --   --   --   --   --  94* 111*  --   --  83* 78*    < > = values in this interval not displayed.     Lab Results   Component Value Date    AST 21 12/16/2022    ALT 20 12/16/2022    ALKPHOS 251 (H) 12/16/2022    BILITOTAL 0.9 12/16/2022    CHANDLER 38 12/18/2022     Lab Results   Component Value Date    INR 1.14 12/16/2022       Attestation:  I have reviewed today's vital signs, notes, medications, labs and imaging.    Maria Fink MD  [facility]ph[facility]

## 2022-12-18 NOTE — DISCHARGE SUMMARY
[facility]  Hospitalist Discharge Summary      Date of Admission:  12/15/2022  Date of Discharge:  12/16/2022  2:15 PM  Discharging Provider: Yahir Barcenas MD  Discharge Service: Hospitalist Service    Discharge Diagnoses   New left pneumothorax  Right pneumothorax status post chest tube placement  Acute on chronic respiratory failure  Acute GI bleed  History of pneumonia  Acute renal failure requiring intermittent hemodialysis  History of liver transplant  Immunosuppression state  Paroxysmal A. fib  Obstructive sleep apnea  Dysphagia on tube feeding    Unresulted Labs Ordered in the Past 30 Days of this Admission     Date and Time Order Name Status Description    12/16/2022  7:30 AM Prepare red blood cells (unit) Preliminary     12/16/2022  7:30 AM Prepare red blood cells (unit) Preliminary           Discharge Disposition   Transferred to intensive care  Condition at discharge: Stable    Hospital Course   Blanco Osborne is a 57 year old male who is known to have paroxysmal A. fib, suspected COPD and hypertension.  He was admitted to an outside acute care hospital 11/22 for acute respiratory failure secondary to pneumonia.  His stay was complicated with cardiac arrest with prolonged resuscitation.  He was intubated and put on mechanical ventilation.  Failed extubation x2 .  Noted to be in ARDS now status post tracheostomy.  Noted to be in septic shock with multiorgan failure.  He requires intermittent hemodialysis for acute renal failure.  He stay at outside hospital was complicated with recurrent right-sided pneumothorax.  He now has chest tube in place on waterseal.  He had strep pneumonia, parainfluenza and Aspergillus.  Infectious disease consulted and completed 15-day course of ceftriaxone and 1 week course of voriconazole.  Noted to have ascites status post paracentesis 12/1.  Cytology negative for malignancy.  He is now on tube feedings for dysphagia and is physically deconditioned.  He has been transferred to  [facility] for vent weaning and further cares  12/16.  Patient noted to have bright red blood per rectum.  Hemoglobin 6.6.  Eliquis and all other anticoagulation held.  Chest x-ray reveals new left-sided pneumothorax.  Plan to transfer patient to an higher level of care acute hospital.  Called transfer line and talk to [facility] apparently they do not have a bed.  Advised to wait.  Discussed with pulmonology team.  They decided to send patient to [facility] ED.      Consultations This Hospital Stay   THERAPEUTIC RECREATION EVAL & TREAT  NUTRITION SERVICES ADULT IP CONSULT  NUTRITION SERVICES ADULT IP CONSULT  PULMONARY IP CONSULT  NEPHROLOGY IP CONSULT  OCCUPATIONAL THERAPY ADULT IP CONSULT  PHYSICAL THERAPY ADULT IP CONSULT  RESPIRATORY CARE IP CONSULT  SPEECH LANGUAGE PATH ADULT IP CONSULT  NUTRITION SERVICES ADULT IP CONSULT  WOUND OSTOMY CONTINENCE NURSE  IP CONSULT  INTERVENTIONAL RADIOLOGY ADULT/PEDS IP CONSULT  INTERVENTIONAL RADIOLOGY ADULT/PEDS IP CONSULT  PHARMACY IP CONSULT  NUTRITION SERVICES ADULT IP CONSULT  NUTRITION SERVICES ADULT IP CONSULT  NEPHROLOGY IP CONSULT  NUTRITION SERVICES ADULT IP CONSULT  WOUND OSTOMY CONTINENCE NURSE  IP CONSULT  INTERVENTIONAL RADIOLOGY ADULT/PEDS IP CONSULT    Code Status   Full Code    Time Spent on this Encounter   I, Yahri Barcenas MD, personally saw the patient today and spent greater than 30 minutes discharging this patient.       Yahir Barcenas MD  [facility] [address] [place] [address]  Phone: [phone]  Fax: [phone]  ______________________________________________________________________    Physical Exam   Vitals reviewed in epic.  General: Patient is lying in bed comfortably in no distress.  He is awake alert oriented to person mood and affect appear normal  Lungs: On mechanical ventilation good air entry noted right-sided chest pain noted  Heart: He has a good S1 and S2 no obvious murmurs peripheral pulses  are palpable and symmetrical  Musculoskeletal: He has good muscle tone noted and digits appear acyanotic no obvious lower extremity edema noted  Skin: On inspection no obvious rashes noted is warm to touch skin turgor appear normal.  Please refer to nursing/wound care note for complete skin exam.       Primary Care Physician   Ki Powers    Discharge Orders   No discharge procedures on file.    Significant Results and Procedures     Results for orders placed or performed during the hospital encounter of 12/15/22   XR Chest Port 1 View     Value    Radiologist flags New left pneumothorax (AA)    Narrative    EXAM: XR CHEST PORT 1 VIEW  LOCATION: [facility]  DATE/TIME: 12/16/2022 8:49 AM    INDICATION: assess PTX  COMPARISON: Portable chest radiography 12/13/2022      Impression    IMPRESSION:     Similar size of moderate left pleural effusion. There is a new left pneumothorax visible in the lateral left apex and associated angular opacity related to the left lung towards the hilum consistent with related atelectasis.    Pleural drain in the right lateral chest is similar in position. No visible right pneumothorax. Opacity in the mid and lower right chest relates to layering pleural fluid and related atelectasis.    Right PICC extends to the middle third of the SVC and tunneled right jugular dialysis catheter terminates in the right atrium. Tracheostomy resides within the tracheal air column.    Left ventricular heart border is increasingly obscured by pleural fluid and adjacent lung opacity. Right atrial heart border is normal.      [Critical Result: New left pneumothorax]    Finding was identified on 12/16/2022 8:50 AM.     Patchy, the charge nurse at [facility] was contacted by me on 12/16/2022 8:59 AM and verbalized understanding of the critical result.             Discharge Medications   Discharge Medication List as of 12/16/2022  1:54 PM      CONTINUE these medications which  have NOT CHANGED    Details   acetylcysteine (MUCOMYST) 20 % neb solution Take 2 mLs by nebulization 2 times daily, Disp-100 mL, R-1, No Print Out      albuterol (PROVENTIL) (2.5 MG/3ML) 0.083% neb solution Take 1 vial (2.5 mg) by nebulization every 2 hours as needed for shortness of breath, Disp-90 mL, R-1, No Print Out      apixaban ANTICOAGULANT (ELIQUIS) 5 MG tablet 1 tablet (5 mg) by Oral or Feeding Tube route 2 times daily, Disp-60 tablet, R-1, No Print Out      calcium carbonate (TUMS) 500 MG chewable tablet Take 1-2 chew tab by mouth 4 times daily as needed for heartburn, Historical      carboxymethylcellulose PF (REFRESH PLUS) 0.5 % ophthalmic solution Place 1 drop into both eyes 3 times daily, Disp-50 each, R-1, No Print Out      famotidine (PEPCID) 20 MG tablet Take 20 mg by mouth 2 times daily as needed (heartburn), Historical      folic acid 5 MG/ML injection Inject 0.2 mLs (1 mg) into the vein daily, Disp-50 mL, R-1, No Print Out      glucose 40 % (400 mg/mL) gel Take 15-30 g by mouth every 15 minutes as needed for low blood sugarDisp-100 mL, [id number]      hydrocortisone sodium succinate PF (SOLU-CORTEF) 100 MG injection Inject 0.5 mLs (25 mg) into the vein every 6 hours, Disp-100 mL, R-1, No Print Out      insulin glargine (LANTUS PEN) 100 UNIT/ML pen Inject 30 Units Subcutaneous every morning (before breakfast), Disp-15 mL, R-1, No Print OutIf Lantus is not covered by insurance, may substitute Basaglar or Semglee or other insulin glargine product per insurance preference at same dose and frequency.         ipratropium - albuterol 0.5 mg/2.5 mg/3 mL (DUONEB) 0.5-2.5 (3) MG/3ML neb solution Take 1 vial (3 mLs) by nebulization 4 times daily, Disp-90 mL, R-1, No Print Out      lactulose (CHRONULAC) 10 GM/15ML solution 30 mLs (20 g) by Oral or Feeding Tube route 3 times daily, Disp-100 mL, R-1, No Print Out      midodrine (PROAMATINE) 10 MG tablet 1 tablet (10 mg) by Oral or Feeding Tube route 3  times daily (with meals), Disp-100 tablet, R-1, No Print Out      Multiple Vitamins-Minerals (MULTIVITAMINS W/MINERALS) liquid 15 mLs by Per Feeding Tube route daily, Disp-100 mL, R-1, No Print Out      nystatin (MYCOSTATIN) 368351 UNIT/ML suspension Swish and swallow 5 mLs (500,000 Units) in mouth 4 times dailyDisp-100 mL, [id number]      ondansetron (ZOFRAN ODT) 4 MG ODT tab Take 1 tablet (4 mg) by mouth every 6 hours as needed for nausea or vomiting, Disp-30 tablet, R-1, No Print Out      oxyCODONE (ROXICODONE) 5 MG tablet 1 tablet (5 mg) by Per Feeding Tube route every 4 hours as needed for moderate pain (4-6), Disp-60 tablet, R-0, No Print Out      pantoprazole (PROTONIX) 2 mg/mL SUSP suspension 20 mLs (40 mg) by Per Feeding Tube route every morning (before breakfast), Disp-100 mL, R-1, No Print Out      rifaximin (XIFAXAN) 550 MG TABS tablet 1 tablet (550 mg) by Per Feeding Tube route 2 times daily, Disp-60 tablet, R-1, No Print Out      tacrolimus (GENERIC EQUIVALENT) 1 mg/mL suspension 1 mL (1 mg) by Oral or Feeding Tube route 2 times daily, Disp-100 mL, R-4, E-Prescribe      !! order for DME Equipment being ordered: Compression knee high stockings for B LE lymphedema [id number] each, R-0, Local Print      !! order for DME Equipment being ordered: Class 2 (30-40mmHg) compression knee high stockings, night time knee high compression alternative, bandaging suppliesDisp-1 each, R-0, Local Print       !! - Potential duplicate medications found. Please discuss with provider.      STOP taking these medications       epoetin mirta-epbx (RETACRIT) 95281 UNIT/ML injection Comments:   Reason for Stopping:             Allergies   No Known Allergies

## 2022-12-18 NOTE — PROGRESS NOTES
[facility] Intensive Care Unit  History and Physical  December 18, 2022      Blanco Osborne MRN# [id number]   Age: 57 year old YOB: 1965     Date of Admission: 12/16/2022    Primary care provider: Ki Powers            Assessment and Plan:     Blanco Osborne is a 57 year old male admitted on 12/16/2022 for No chief complaint on file.  . My assessment and plan for this patient is as follows:    SUMMARY: Blanco Osborne is a 57 year old male with paroxysmal A. fib, Liver transplant (2016), CHRISTIAN on BiPAP, h/o CVA and hypertension. He is s/p Trach/PEG following acute resp failure and PEA due to Strep/para influenza pna. He had septic shock with MSOF and is currently on iHD. He had Rt sided Chest tube placed for hydroptx on 12/12/22. He was transferred to [place] on 12/14/22 for CIP. Was noted to have bloody stool on 12/15 and 12/16 and had left Ptx which required left sided chest tube leading to ~900ml of bloody output.   He was given 2uPRBC's and transferred to [facility].    Neurology and Psychiatry: Sedation and analgesia achieved by;  1. H/o Embolic CVA: Elliquis is currently held and will start heparin drip once GI bleed subsides  2. CIP: Continue PT/OT     Pulmonary: Lung protective ventilation strategy with a tidal volume of 6-8mL/kg of ideal body weight, head of the bed elevated at 30 degrees, and oral care.  1. H/o Strep/Parainfluenza pna leading to Acute resp failure: Failed extubation x2. S/p Trach on 11/29/22. Will try sequential pressure support trials today, still requiring significant amount of pressure support. Will try PST during the day and full vent support at night.  2. H/o CHRISTIAN was on BiPAP, currently Trach'ed.  3. New Left Ptx with acute on hypoxic respiratory failure and ? hemithorax: Etiology unclear. Pigtail placed on 12/16/2022 and significant decrease in volume of left pneumothorax  4. Hx of right hydropneumothorax: No output nor air leak. Concern for right  hemothorax and will consult CT surgery for consideration of additional chest tube.      Cardiovascular system:  1. H/o HTN  2. Afib: On Eliquis but on hold due to ? GI bl;eed    Renal/Electrolytes:  1. LORETTA: was on CRRT but now on iHD. He was on MWF schedule, had HD session yesterday. No acute need noted. Still Anuric.    Plan:  - Nephrology for iHD needs    ID:  1. H/o Strep/Parainfluenza infection: Completed treatment course  2. H/o Aspergillus: Was on Vori for <15days.  3. 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    Plan:  - No acute issues.  -  Pleural fluid cultures are negative so far    GI/:  1. GI bleed: Had a bloody stool overnight on 12/17. GI team suspicious of ? iatrogenic rectal ulcer  2. Ascites: S/p Tap on 12/1 - no evidence of infection/Malignancy  3. LIver tx: Cont Tac goal level of 5 -7 and solucortef. Ammonia> 100 but having loose stools, alert and no need to continue to check values     Plan:  - Appreciate GI rcommedations, no dire need for scope now  - Eliquis is being held and will plan to start heparin drip prior to transition to Elliquis after GI bleed subsides and pleural fluid is less bloody.  - cont rifaximin and hold lactulose due to very frequent loose stools  - cont midodrine    Endocrine:  1. DM:    Plan:  - Was on lantus 30u daily. Will hold or cut down by 50%.  - SSI    Nutrition:   - Hold TF due to possible GI bleed. Will evaluate and recommence when bleed subsides    Heme/Onc:  1.  Chronic Anemia: Likely due to chronic illness and GI bleed  2.  ChronicThrombocytopenia:    Plan:  - Will monitor counts closely and transfuse accordingly    ICU prophylaxis:      DVT; SCDs        VAP; As above     Restrain needed: No     Stress ulcer: IV PPI     Central line: Needed today for IV access/Meds.    Code Status: Full    Prognosis: Critical    Family update: Done at bedside by me.    Billing: total time spend providing critical care was 40 min, excluding the procedures.    Randy  MD Reece  Pulmonary, Critical Care and Sleep Medicine  [facility]  Pager: [phone]             Treatment goals for next 24 hours:   1. Continue to monitor blood counts  2. Chest tube management and Thoracic surgery consult  3. Restarted graduated sequential PSTs         History of Present Illness:     Blanco Osborne is a 57 year old male with paroxysmal A. fib, Liver transplant (2016) on Tacrolimus and solucortef, suspected COPD, CHRISTIAN on BiPAP, h/o CVA and hypertension.  He was admitted on 11/22/22 for acute respiratory failure secondary to pneumonia (Strep and Para influenza). He had out of hospital cardiac arrest with prolonged resuscitation along with Cooling. He developed ARDS, septic shock with multiorgan failure, initially CRRT but now on iHD for LORETTA and Rt sided Ptx requiring Chest tube. Failed extubation x2.  Status post tracheostomy (12/8) and has Shiley prox XLT and PEG.  He was tolerating 14hrs of TD prior to discharge. He had Rt sided Chest tube placed for hydroptx on 12/12/22. He was transferred to [place] on 12/14/22 for CIP. Noted to have ascites status post paracentesis 12/1.  Cytology negative for malignancy.      Today in AM had CXR which showed new left Ptx. There was also concern for new GI bleed (Brick red stool overnight) and Hgb was down to 6, repeat was 7. Apixaban held and is being given 2u PRBC's. Pt was on Alb/mucomyst nebs and meta nebs too.  Post Left chest tube placement - pt had about 950ml of bloody fluid vs. Blood through chest tube.           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     NAFLD (nonalcoholic fatty liver disease) 2016     CHRISTIAN on CPAP      Parainfluenza type 1 infection 2022     SBP (spontaneous bacterial peritonitis) (H)     [facility]     Sepsis due to Streptococcus pneumoniae with acute hypoxic respiratory failure (H) 2022     Tubular adenoma 10/2019    Large cecal adenoma- due for surveillance colonoscopy in 3 years (10/2022)             Past Surgical History:     Past Surgical History:   Procedure Laterality Date     APPENDECTOMY       BENCH LIVER N/A 2016    Procedure: BENCH LIVER;  Surgeon: Enoc Crews MD;  Location: [facility] OR     COLONOSCOPY  13    repeat in 2018     COLONOSCOPY N/A 10/4/2019    Procedure: COLONOSCOPY, WITH POLYPECTOMY AND BIOPSY;  Surgeon: Go Chong MD;  Location: [facility] OR     HERNIA REPAIR       IR CHEST TUBE PLACEMENT NON-TUNNELED RIGHT  2022     IR CVC TUNNEL PLACEMENT > 5 YRS OF AGE  2022     IR GASTROSTOMY TUBE PERCUTANEOUS PLCMNT  2022     IR PARACENTESIS  2022     IR PARACENTESIS  2022     IR THORACENTESIS  2022     IR TRANSCATHETER BIOPSY  2022     TRACHEOSTOMY N/A 2022    Procedure: TRACHEOSTOMY;  Surgeon: Nilesh Jackson MD;  Location:  OR     TRANSPLANT LIVER RECIPIENT  DONOR N/A 2016    Procedure: TRANSPLANT LIVER RECIPIENT  DONOR;  Surgeon: Enoc Crews MD;  Location: [facility] OR             Social History:     Social History     Socioeconomic History     Marital status:      Spouse name: Not on file     Number of children: Not on file     Years of education: Not on file     Highest education level: Not on file   Occupational History     Not on file   Tobacco Use     Smoking status: Never     Smokeless tobacco:  Never   Substance and Sexual Activity     Alcohol use: Not Currently     Alcohol/week: 0.0 standard drinks     Drug use: No     Sexual activity: Not on file   Other Topics Concern     Parent/sibling w/ CABG, MI or angioplasty before 65F 55M? Not Asked   Social History Narrative     Not on file     Social Determinants of Health     Financial Resource Strain: Not on file   Food Insecurity: Not on file   Transportation Needs: Not on file   Physical Activity: Not on file   Stress: Not on file   Social Connections: Not on file   Intimate Partner Violence: Not on file   Housing Stability: Not on file         Smoking:   Social History     Tobacco Use     Smoking status: Never     Smokeless tobacco: Never   Substance Use Topics     Alcohol use: Not Currently     Alcohol/week: 0.0 standard drinks     Drug use: No              Family History:   No family history on file.             Medications:     Current Facility-Administered Medications   Medication     - MEDICATION INSTRUCTIONS for Dialysis Patients -     albuterol (PROVENTIL) neb solution 2.5 mg     carboxymethylcellulose PF (REFRESH PLUS) 0.5 % ophthalmic solution 1 drop     chlorhexidine (PERIDEX) 0.12 % solution 15 mL     dextrose 5% infusion     glucose gel 15-30 g    Or     dextrose 50 % injection 25-50 mL    Or     glucagon injection 1 mg     folic acid injection 1 mg     hydrocortisone sodium succinate PF (solu-CORTEF) injection 25 mg     insulin aspart (NovoLOG) injection (RAPID ACTING)     ipratropium - albuterol 0.5 mg/2.5 mg/3 mL (DUONEB) neb solution 3 mL     [Held by provider] lactulose (CHRONULAC) solution 20 g     midodrine (PROAMATINE) tablet 10 mg     multivitamins w/minerals liquid 15 mL     naloxone (NARCAN) injection 0.2 mg    Or     naloxone (NARCAN) injection 0.4 mg    Or     naloxone (NARCAN) injection 0.2 mg    Or     naloxone (NARCAN) injection 0.4 mg     nystatin (MYCOSTATIN) suspension 500,000 Units     ondansetron (ZOFRAN ODT) ODT tab 4 mg      oxyCODONE (ROXICODONE) tablet 5 mg     pantoprazole (PROTONIX) 80 mg in sodium chloride 0.9 % 100 mL infusion     rifaximin (XIFAXAN) tablet 550 mg     tacrolimus (GENERIC EQUIVALENT) suspension 1 mg               Review of Systems:   Unable to get due to trach status         Physical Examination:   General: Alert, oriented, not in distress. S/p Trach  Vitals: /71   Pulse 68   Temp 97.6  F (36.4  C) (Oral)   Resp 16   Wt 88 kg (194 lb 0.1 oz)   SpO2 100%   BMI 26.31 kg/m     SpO2: 100 %    HEENT: neck supple, symmetrical, no lymph node enlargement, thyromegaly, bruit, JVD, pupils are isocoric and equally responsive to the light. Mallampati score, Grade III  Trach +. Healing lip lesions.  Lungs: both hemithoraces are symmetrical, normal to palpation, no dullness to percussion, auscultation of lungs revealed bilateral coarse crackles. Rubén chest tubes in place  CVS: Normal S1 and S2, no additional heart sounds, murmur, rub, normal peripheral pulses  Abdomen: Bowel sounds present, soft, Diffusely mildly tender, mildly distended, no organomegaly, ascitis, mass  Extremities/musculoskeletal: no peripheral edema, deformity, cyanosis, clubbing  Neurology: alert, orientedx3, no motor/sensorial deficits, cranial nerves grossly normal  Skin: no rash  Psychiatry: Mood and affect are appropriate       Exam of Line sites: No erythema or discharge.          Labs:     CBC RESULTS:     Recent Labs   Lab 12/18/22  0603 12/18/22  0215 12/18/22  0013 12/17/22  1844 12/17/22  1136 12/17/22  0519 12/16/22  2239 12/16/22  1620 12/16/22  1129 12/16/22  0932 12/16/22  0626 12/15/22  0414 12/14/22  0412 12/13/22  0427   WBC  --   --   --   --   --   --  3.9* 4.5  --   --  3.4* 3.7* 2.8* 4.6   RBC  --   --   --   --   --   --  2.43* 2.18*  --   --  2.05* 2.26* 2.11* 1.95*   HGB 7.2* 7.6* 7.8* 9.3* 8.8* 8.1* 7.9*  7.9* 7.0* 7.0*   < > 6.6* 7.3* 7.0* 6.5*   HCT  --   --   --   --   --   --  25.7* 23.3* 23.1*  --  22.3* 24.7*  23.1* 22.0*   PLT  --   --   --   --   --   --  94* 111*  --   --  83* 78* 58* 63*    < > = values in this interval not displayed.       Basic Metabolic Panel:  Recent Labs   Lab 12/18/22  1014 12/18/22  0605 12/18/22  0013 12/17/22  2105 12/17/22  1644 12/17/22  1404 12/17/22  0253 12/16/22  2239 12/16/22 2027 12/16/22  1620 12/16/22  1139 12/16/22  0932 12/16/22  0626 12/15/22  0809 12/15/22  0414 12/14/22  0944 12/14/22  0412 12/13/22  0748 12/13/22  0427   NA  --   --   --   --   --   --   --  135  --  136  --  135* 137  --  135  --  136  --  136   POTASSIUM  --   --   --   --   --   --   --  4.0  --  4.2  --  4.0 4.3  --  4.0  --  4.1  --  3.8   CHLORIDE  --   --   --   --   --   --   --  99  --  98  --   --  98  --  99  --  99  --  101   CO2  --   --   --   --   --   --   --  26  --  26  --   --  26  --  30  --  28  --  29   BUN  --   --   --   --   --   --   --  114*  --  108*  --   --  96.6*  --  72*  --  96*  --  72*   CR  --   --   --   --   --   --   --  3.35*  --  3.59*  --   --  3.10*  --  2.39*  --  3.36*  --  2.55*   * 115* 110* 102* 113* 120*   < > 104*   < > 179*   < > 149* 167*   < > 136*   < > 196*   < > 222*  210*   KELLY  --   --   --   --   --   --   --  8.0*  --  8.3*  --   --  8.1*  --  8.0*  --  7.9*  --  7.4*    < > = values in this interval not displayed.       INR  Recent Labs   Lab 12/16/22  2239 12/16/22  1620   INR 1.14 1.58*          Randy Newell MD  Pulmonary, Critical Care and Sleep Medicine  [facility]  Pager: [phone]

## 2022-12-18 NOTE — PROGRESS NOTES
[facility] Progress Note     IMPRESSION:  57M s/p liver tx 2016, s/p trach/PEG following respiratory failure and PEA due to strep/parainfluenza pna complicated by MSOF requiring HD, Rt chest tube hydroptx, d/cd to [place]  after prolonged hospitalization, but overnight developed bloody stool and Lt sided hemothorax, s/p chest tube.       First 24 hrs this admit had 1-2 brwn stools, no stools overnight/today.    RECOMMENDATIONS:  Okay to restart Eliquis  Follow stool output  Tacro level/dosing per [facility] tx team  No plan for endoscopic eval presently    Wife at the bedside today - reviewed active issues.     Total time spent in chart review, direct medical discussion, examination, and documentation was 30 minutes    Rashawn Chu DO   [facility]  Cell [phone]    ________________________________________________________________________      SUBJECTIVE:  Feeling better today.  No abd pain     OBJECTIVE:  /74   Pulse 66   Temp 97.7  F (36.5  C) (Oral)   Resp (!) 42   Wt 88 kg (194 lb 0.1 oz)   SpO2 100%   BMI 26.31 kg/m    Temp (24hrs), Av.5  F (36.4  C), Min:97.3  F (36.3  C), Max:97.7  F (36.5  C)    Patient Vitals for the past 72 hrs:   Weight   22 2035 88 kg (194 lb 0.1 oz)       Intake/Output Summary (Last 24 hours) at 2022 1350  Last data filed at 2022 1200  Gross per 24 hour   Intake 1690 ml   Output 550 ml   Net 1140 ml        PHYSICAL EXAM  GEN: Alert, oriented x3, communicative and in NAD.    LYMPH: No LAD noted.  HRT: RRR  LUNGS: CTA  ABD: ND, +BS, no guarding or pain to palpation, no rebound, no HSM.  SKIN: No rash, jaundice or spider angiomata      Additional Data:  I have reviewed the patient's new clinical lab results:     Recent Labs   Lab Test 22  1226 22  0603 22  0215 22  0519 22  2239 22  1620 22  0932 22  0626 22  1448 22  0513   WBC  --   --   --   --  3.9* 4.5  --  3.4*    < > 8.0   HGB 7.9* 7.2* 7.6*   < > 7.9*  7.9* 7.0*   < > 6.6*   < > 7.8*   MCV  --   --   --   --  106* 107*  --  109*   < > 113*   PLT  --   --   --   --  94* 111*  --  83*   < > 76*   INR  --   --   --   --  1.14 1.58*  --   --   --  1.16*    < > = values in this interval not displayed.     Recent Labs   Lab Test 12/16/22 2239 12/16/22  1620 12/16/22  0932 12/16/22  0626   POTASSIUM 4.0 4.2 4.0 4.3   CHLORIDE 99 98  --  98   CO2 26 26  --  26   * 108*  --  96.6*   ANIONGAP 10 12  --  13     Recent Labs   Lab Test 12/16/22 2239 12/16/22  0626 12/15/22  0414 12/13/22  0427 12/12/22  0431 11/25/22  1947 11/25/22  1859 07/10/18  0720 10/31/17  1038 09/25/16  0611 09/24/16  1055 09/23/16  0720 09/22/16  0741 09/21/16  0034 09/20/16  1259   ALBUMIN 1.7* 2.2* 1.8*   < > 1.6*   < >  --    < >  --    < >  --    < > 2.8*   < > 3.8   BILITOTAL 0.9 0.6  --   --  0.8   < >  --    < >  --    < >  --    < > 2.3*   < > 6.8*   ALT 20 16  --   --  15   < >  --    < >  --    < >  --    < > 726*   < > 26   AST 21 22  --   --  21   < >  --    < >  --    < >  --    < > 570*   < > 32   PROTEIN  --   --   --   --   --   --  70*  --  Negative  --  Negative   < >  --   --   --    LIPASE  --   --   --   --   --   --   --   --   --   --   --   --  63*  --   --    AMYLASE  --   --   --   --   --   --   --   --   --   --   --   --  72  --  86    < > = values in this interval not displayed.

## 2022-12-18 NOTE — PLAN OF CARE
Neuro: Pt able to mouth words; making appropriate requests. Appears A&Ox4. PERRL. Minimal independent ROM.   Pain: tender tracheostomy site; cleansed and foam applied; inner canula changed. Abdomenal discomfort to palpation/assessment, as well as bilateral chest tube insertion sites; MD assessed.  CV: NSR; VSS. Afebrile.  Pulm: tolerating trach/vent A/C 30% FiO2. Lungs coarse, clears to suction.   GI: G-tube clamped, Rx only, otherwise NPO overnight. No BM overnight  : HD per nephrology. No voids overnight  MS: multiple skin wounds/lesions. See flowsheet     LDA's: 6.0 Shiley cuffed  Gastric tube  L pleural chest tube  R pleural chest tube  R HD catheter  R PICC triple lumen  Gtts: Protonix and D5@ 50ml/hr for glucose maintenance.     HGB trending down overnight, MD notified. BP 86/63, albumin 50g given with therapeutic result.      Goal Outcome Evaluation: progressing x increasingly more anemic.  Elsa Hernandez RN

## 2022-12-19 ENCOUNTER — APPOINTMENT (OUTPATIENT)
Dept: CT IMAGING | Facility: CLINIC | Age: 57
DRG: 207 | End: 2022-12-19
Attending: INTERNAL MEDICINE
Payer: COMMERCIAL

## 2022-12-19 LAB
ALBUMIN SERPL-MCNC: 1.9 G/DL (ref 3.4–5)
ALBUMIN SERPL-MCNC: 2.1 G/DL (ref 3.4–5)
ANION GAP SERPL CALCULATED.3IONS-SCNC: 11 MMOL/L (ref 3–14)
BUN SERPL-MCNC: 75 MG/DL (ref 7–30)
CALCIUM SERPL-MCNC: 7.8 MG/DL (ref 8.5–10.1)
CHLORIDE BLD-SCNC: 94 MMOL/L (ref 94–109)
CO2 SERPL-SCNC: 26 MMOL/L (ref 20–32)
CREAT SERPL-MCNC: 3.06 MG/DL (ref 0.66–1.25)
ERYTHROCYTE [DISTWIDTH] IN BLOOD BY AUTOMATED COUNT: 17.8 % (ref 10–15)
GFR SERPL CREATININE-BSD FRML MDRD: 23 ML/MIN/1.73M2
GLUCOSE BLD-MCNC: 133 MG/DL (ref 70–99)
GLUCOSE BLDC GLUCOMTR-MCNC: 120 MG/DL (ref 70–99)
GLUCOSE BLDC GLUCOMTR-MCNC: 124 MG/DL (ref 70–99)
GLUCOSE BLDC GLUCOMTR-MCNC: 126 MG/DL (ref 70–99)
GLUCOSE BLDC GLUCOMTR-MCNC: 134 MG/DL (ref 70–99)
GLUCOSE BLDC GLUCOMTR-MCNC: 137 MG/DL (ref 70–99)
GLUCOSE BLDC GLUCOMTR-MCNC: 138 MG/DL (ref 70–99)
HCT VFR BLD AUTO: 26 % (ref 40–53)
HGB BLD-MCNC: 8.2 G/DL (ref 13.3–17.7)
HGB BLD-MCNC: 8.2 G/DL (ref 13.3–17.7)
MCH RBC QN AUTO: 32.2 PG (ref 26.5–33)
MCHC RBC AUTO-ENTMCNC: 31.5 G/DL (ref 31.5–36.5)
MCV RBC AUTO: 102 FL (ref 78–100)
PHOSPHATE SERPL-MCNC: 5.7 MG/DL (ref 2.5–4.5)
PLATELET # BLD AUTO: 84 10E3/UL (ref 150–450)
POTASSIUM BLD-SCNC: 4.4 MMOL/L (ref 3.4–5.3)
RBC # BLD AUTO: 2.55 10E6/UL (ref 4.4–5.9)
SODIUM SERPL-SCNC: 131 MMOL/L (ref 133–144)
TACROLIMUS BLD-MCNC: 5 UG/L (ref 5–15)
TME LAST DOSE: NORMAL H
TME LAST DOSE: NORMAL H
WBC # BLD AUTO: 4.1 10E3/UL (ref 4–11)

## 2022-12-19 PROCEDURE — 85018 HEMOGLOBIN: CPT | Performed by: INTERNAL MEDICINE

## 2022-12-19 PROCEDURE — 94640 AIRWAY INHALATION TREATMENT: CPT

## 2022-12-19 PROCEDURE — 634N000001 HC RX 634: Performed by: SURGERY

## 2022-12-19 PROCEDURE — 250N000012 HC RX MED GY IP 250 OP 636 PS 637: Performed by: INTERNAL MEDICINE

## 2022-12-19 PROCEDURE — 0WP9X0Z REMOVAL OF DRAINAGE DEVICE FROM RIGHT PLEURAL CAVITY, EXTERNAL APPROACH: ICD-10-PCS | Performed by: NURSE PRACTITIONER

## 2022-12-19 PROCEDURE — 99291 CRITICAL CARE FIRST HOUR: CPT | Performed by: INTERNAL MEDICINE

## 2022-12-19 PROCEDURE — 94003 VENT MGMT INPAT SUBQ DAY: CPT

## 2022-12-19 PROCEDURE — 250N000011 HC RX IP 250 OP 636: Performed by: INTERNAL MEDICINE

## 2022-12-19 PROCEDURE — 250N000009 HC RX 250: Performed by: INTERNAL MEDICINE

## 2022-12-19 PROCEDURE — 250N000013 HC RX MED GY IP 250 OP 250 PS 637: Performed by: INTERNAL MEDICINE

## 2022-12-19 PROCEDURE — 94640 AIRWAY INHALATION TREATMENT: CPT | Mod: 76

## 2022-12-19 PROCEDURE — 80197 ASSAY OF TACROLIMUS: CPT | Performed by: HOSPITALIST

## 2022-12-19 PROCEDURE — 258N000003 HC RX IP 258 OP 636: Performed by: INTERNAL MEDICINE

## 2022-12-19 PROCEDURE — 250N000009 HC RX 250: Performed by: HOSPITALIST

## 2022-12-19 PROCEDURE — 80069 RENAL FUNCTION PANEL: CPT | Performed by: HOSPITALIST

## 2022-12-19 PROCEDURE — 999N000185 HC STATISTIC TRANSPORT TIME EA 15 MIN

## 2022-12-19 PROCEDURE — 99232 SBSQ HOSP IP/OBS MODERATE 35: CPT | Performed by: INTERNAL MEDICINE

## 2022-12-19 PROCEDURE — C9113 INJ PANTOPRAZOLE SODIUM, VIA: HCPCS | Performed by: INTERNAL MEDICINE

## 2022-12-19 PROCEDURE — 90937 HEMODIALYSIS REPEATED EVAL: CPT

## 2022-12-19 PROCEDURE — G0463 HOSPITAL OUTPT CLINIC VISIT: HCPCS

## 2022-12-19 PROCEDURE — 250N000013 HC RX MED GY IP 250 OP 250 PS 637: Performed by: HOSPITALIST

## 2022-12-19 PROCEDURE — 200N000001 HC R&B ICU

## 2022-12-19 PROCEDURE — 999N000253 HC STATISTIC WEANING TRIALS

## 2022-12-19 PROCEDURE — 74176 CT ABD & PELVIS W/O CONTRAST: CPT

## 2022-12-19 PROCEDURE — 999N000157 HC STATISTIC RCP TIME EA 10 MIN

## 2022-12-19 PROCEDURE — 250N000013 HC RX MED GY IP 250 OP 250 PS 637: Performed by: PHYSICIAN ASSISTANT

## 2022-12-19 RX ORDER — FOLIC ACID 5 MG/ML
1 INJECTION, SOLUTION INTRAMUSCULAR; INTRAVENOUS; SUBCUTANEOUS DAILY
Status: DISCONTINUED | OUTPATIENT
Start: 2022-12-19 | End: 2022-12-19

## 2022-12-19 RX ORDER — NALOXONE HYDROCHLORIDE 0.4 MG/ML
0.4 INJECTION, SOLUTION INTRAMUSCULAR; INTRAVENOUS; SUBCUTANEOUS
Status: DISCONTINUED | OUTPATIENT
Start: 2022-12-19 | End: 2022-12-19

## 2022-12-19 RX ORDER — IPRATROPIUM BROMIDE AND ALBUTEROL SULFATE 2.5; .5 MG/3ML; MG/3ML
3 SOLUTION RESPIRATORY (INHALATION)
Status: DISCONTINUED | OUTPATIENT
Start: 2022-12-19 | End: 2022-12-19

## 2022-12-19 RX ORDER — NYSTATIN 100000/ML
500000 SUSPENSION, ORAL (FINAL DOSE FORM) ORAL 4 TIMES DAILY
Status: DISCONTINUED | OUTPATIENT
Start: 2022-12-19 | End: 2022-12-19

## 2022-12-19 RX ORDER — OXYCODONE HYDROCHLORIDE 5 MG/1
5 TABLET ORAL EVERY 4 HOURS PRN
Status: DISCONTINUED | OUTPATIENT
Start: 2022-12-19 | End: 2022-12-19

## 2022-12-19 RX ORDER — CARBOXYMETHYLCELLULOSE SODIUM 5 MG/ML
1 SOLUTION/ DROPS OPHTHALMIC 3 TIMES DAILY
Status: DISCONTINUED | OUTPATIENT
Start: 2022-12-19 | End: 2022-12-19

## 2022-12-19 RX ORDER — NALOXONE HYDROCHLORIDE 0.4 MG/ML
0.2 INJECTION, SOLUTION INTRAMUSCULAR; INTRAVENOUS; SUBCUTANEOUS
Status: DISCONTINUED | OUTPATIENT
Start: 2022-12-19 | End: 2022-12-19

## 2022-12-19 RX ORDER — GUAIFENESIN 600 MG/1
15 TABLET, EXTENDED RELEASE ORAL DAILY
Status: DISCONTINUED | OUTPATIENT
Start: 2022-12-19 | End: 2022-12-19

## 2022-12-19 RX ORDER — ACETYLCYSTEINE 200 MG/ML
2 SOLUTION ORAL; RESPIRATORY (INHALATION)
Status: DISCONTINUED | OUTPATIENT
Start: 2022-12-19 | End: 2022-12-20

## 2022-12-19 RX ORDER — ONDANSETRON 4 MG/1
4 TABLET, ORALLY DISINTEGRATING ORAL EVERY 6 HOURS PRN
Status: DISCONTINUED | OUTPATIENT
Start: 2022-12-19 | End: 2022-12-19

## 2022-12-19 RX ORDER — LACTULOSE 10 G/15ML
20 SOLUTION ORAL 3 TIMES DAILY
Status: DISCONTINUED | OUTPATIENT
Start: 2022-12-19 | End: 2022-12-19

## 2022-12-19 RX ORDER — ALBUTEROL SULFATE 0.83 MG/ML
2.5 SOLUTION RESPIRATORY (INHALATION)
Status: DISCONTINUED | OUTPATIENT
Start: 2022-12-19 | End: 2022-12-19

## 2022-12-19 RX ORDER — MIDODRINE HYDROCHLORIDE 5 MG/1
10 TABLET ORAL
Status: DISCONTINUED | OUTPATIENT
Start: 2022-12-19 | End: 2022-12-19

## 2022-12-19 RX ORDER — ALBUMIN (HUMAN) 12.5 G/50ML
50 SOLUTION INTRAVENOUS
Status: DISCONTINUED | OUTPATIENT
Start: 2022-12-19 | End: 2022-12-19

## 2022-12-19 RX ADMIN — IPRATROPIUM BROMIDE AND ALBUTEROL SULFATE 3 ML: .5; 3 SOLUTION RESPIRATORY (INHALATION) at 19:36

## 2022-12-19 RX ADMIN — ACETYLCYSTEINE 2 ML: 200 SOLUTION ORAL; RESPIRATORY (INHALATION) at 07:42

## 2022-12-19 RX ADMIN — Medication 1 DROP: at 00:02

## 2022-12-19 RX ADMIN — ACETYLCYSTEINE 2 ML: 200 SOLUTION ORAL; RESPIRATORY (INHALATION) at 11:11

## 2022-12-19 RX ADMIN — MIDODRINE HYDROCHLORIDE 10 MG: 5 TABLET ORAL at 09:40

## 2022-12-19 RX ADMIN — SODIUM CHLORIDE 200 ML: 9 INJECTION, SOLUTION INTRAVENOUS at 15:23

## 2022-12-19 RX ADMIN — Medication 1 DROP: at 09:41

## 2022-12-19 RX ADMIN — LACTULOSE 20 G: 20 SOLUTION ORAL at 18:10

## 2022-12-19 RX ADMIN — MIDODRINE HYDROCHLORIDE 10 MG: 5 TABLET ORAL at 18:10

## 2022-12-19 RX ADMIN — OXYCODONE HYDROCHLORIDE 5 MG: 5 TABLET ORAL at 22:47

## 2022-12-19 RX ADMIN — MULTIVITAMIN 15 ML: LIQUID ORAL at 09:40

## 2022-12-19 RX ADMIN — IPRATROPIUM BROMIDE AND ALBUTEROL SULFATE 3 ML: .5; 3 SOLUTION RESPIRATORY (INHALATION) at 11:11

## 2022-12-19 RX ADMIN — ACETYLCYSTEINE 2 ML: 200 SOLUTION ORAL; RESPIRATORY (INHALATION) at 19:36

## 2022-12-19 RX ADMIN — DEXTROSE MONOHYDRATE: 50 INJECTION, SOLUTION INTRAVENOUS at 21:17

## 2022-12-19 RX ADMIN — TACROLIMUS 1 MG: 5 CAPSULE ORAL at 09:41

## 2022-12-19 RX ADMIN — OXYCODONE HYDROCHLORIDE 5 MG: 5 TABLET ORAL at 10:07

## 2022-12-19 RX ADMIN — FOLIC ACID 1 MG: 5 INJECTION, SOLUTION INTRAMUSCULAR; INTRAVENOUS; SUBCUTANEOUS at 09:40

## 2022-12-19 RX ADMIN — CHLORHEXIDINE GLUCONATE 0.12% ORAL RINSE 15 ML: 1.2 LIQUID ORAL at 20:52

## 2022-12-19 RX ADMIN — HYDROCORTISONE SODIUM SUCCINATE 25 MG: 100 INJECTION, POWDER, FOR SOLUTION INTRAMUSCULAR; INTRAVENOUS at 20:52

## 2022-12-19 RX ADMIN — IPRATROPIUM BROMIDE AND ALBUTEROL SULFATE 3 ML: .5; 3 SOLUTION RESPIRATORY (INHALATION) at 07:42

## 2022-12-19 RX ADMIN — RIFAXIMIN 550 MG: 550 TABLET ORAL at 09:41

## 2022-12-19 RX ADMIN — ONDANSETRON 4 MG: 4 TABLET, ORALLY DISINTEGRATING ORAL at 20:43

## 2022-12-19 RX ADMIN — NYSTATIN 500000 UNITS: 100000 SUSPENSION ORAL at 09:40

## 2022-12-19 RX ADMIN — OXYCODONE HYDROCHLORIDE 5 MG: 5 TABLET ORAL at 04:01

## 2022-12-19 RX ADMIN — HYDROCORTISONE SODIUM SUCCINATE 25 MG: 100 INJECTION, POWDER, FOR SOLUTION INTRAMUSCULAR; INTRAVENOUS at 13:27

## 2022-12-19 RX ADMIN — IPRATROPIUM BROMIDE AND ALBUTEROL SULFATE 3 ML: .5; 3 SOLUTION RESPIRATORY (INHALATION) at 15:02

## 2022-12-19 RX ADMIN — DEXTROSE MONOHYDRATE: 50 INJECTION, SOLUTION INTRAVENOUS at 11:06

## 2022-12-19 RX ADMIN — RIFAXIMIN 550 MG: 550 TABLET ORAL at 20:52

## 2022-12-19 RX ADMIN — EPOETIN ALFA-EPBX 10000 UNITS: 10000 INJECTION, SOLUTION INTRAVENOUS; SUBCUTANEOUS at 17:01

## 2022-12-19 RX ADMIN — LACTULOSE 20 G: 20 SOLUTION ORAL at 22:31

## 2022-12-19 RX ADMIN — ACETYLCYSTEINE 2 ML: 200 SOLUTION ORAL; RESPIRATORY (INHALATION) at 15:02

## 2022-12-19 RX ADMIN — OXYCODONE HYDROCHLORIDE 5 MG: 5 TABLET ORAL at 00:08

## 2022-12-19 RX ADMIN — NYSTATIN 500000 UNITS: 100000 SUSPENSION ORAL at 13:27

## 2022-12-19 RX ADMIN — Medication: at 15:23

## 2022-12-19 RX ADMIN — OXYCODONE HYDROCHLORIDE 5 MG: 5 TABLET ORAL at 14:09

## 2022-12-19 RX ADMIN — HYDROCORTISONE SODIUM SUCCINATE 25 MG: 100 INJECTION, POWDER, FOR SOLUTION INTRAMUSCULAR; INTRAVENOUS at 09:35

## 2022-12-19 RX ADMIN — DEXTROSE MONOHYDRATE: 50 INJECTION, SOLUTION INTRAVENOUS at 00:47

## 2022-12-19 RX ADMIN — TACROLIMUS 1 MG: 5 CAPSULE ORAL at 22:31

## 2022-12-19 RX ADMIN — NYSTATIN 500000 UNITS: 100000 SUSPENSION ORAL at 18:09

## 2022-12-19 RX ADMIN — OXYCODONE HYDROCHLORIDE 5 MG: 5 TABLET ORAL at 18:16

## 2022-12-19 RX ADMIN — MIDODRINE HYDROCHLORIDE 10 MG: 5 TABLET ORAL at 13:27

## 2022-12-19 RX ADMIN — HYDROCORTISONE SODIUM SUCCINATE 25 MG: 100 INJECTION, POWDER, FOR SOLUTION INTRAMUSCULAR; INTRAVENOUS at 00:08

## 2022-12-19 RX ADMIN — Medication 1 DROP: at 18:10

## 2022-12-19 RX ADMIN — SODIUM CHLORIDE 8 MG/HR: 9 INJECTION, SOLUTION INTRAVENOUS at 14:22

## 2022-12-19 RX ADMIN — Medication 1 DROP: at 22:31

## 2022-12-19 RX ADMIN — Medication 40 MG: at 09:35

## 2022-12-19 RX ADMIN — CHLORHEXIDINE GLUCONATE 0.12% ORAL RINSE 15 ML: 1.2 LIQUID ORAL at 09:35

## 2022-12-19 RX ADMIN — SODIUM CHLORIDE 8 MG/HR: 9 INJECTION, SOLUTION INTRAVENOUS at 03:40

## 2022-12-19 RX ADMIN — NYSTATIN 500000 UNITS: 100000 SUSPENSION ORAL at 22:31

## 2022-12-19 ASSESSMENT — ACTIVITIES OF DAILY LIVING (ADL)
ADLS_ACUITY_SCORE: 51

## 2022-12-19 NOTE — PROGRESS NOTES
SPIRITUAL HEALTH SERVICES  SPIRITUAL ASSESSMENT Progress Note  [facility] ICU     REFERRAL SOURCE: Unit  referral for communion    Per patient's request, brought communion and read scripture and prayers.     PLAN: [facility] remains available for support.    Linda Garcia  Associate   Pager [phone]  [facility] Phone [phone]  [facility] Pager [phone]    [facility] available 24/7 for emergent requests/referrals, either by having the on-call  paged or by entering an ASAP/STAT consult in Epic (this will also page the on-call ).

## 2022-12-19 NOTE — PLAN OF CARE
Goal Outcome Evaluation:      Plan of Care Reviewed With: patient, spouse    Overall Patient Progress: improvingOverall Patient Progress: improving    Pt here with GI bleed and new left sided pneumothorax. A&O x4. Neuros generalized weakness scoring 1/5 on all extremities with RUE slightly stronger. VSS. Tele SR. NPO. Takes pills crushed through G-tube. Holding tube feed today to rest GI. Intubated: FiO2-30%, RR-16, TV-450, and PEEP 5. Patient did pressure support for about 6 hours today before becoming fatigued. Trached with Shiley 6.0. Up with A2 + lift. Oxycodone given for upper abdominal pain x2. Pt scoring green on the Aggression Stop Light Tool. Plan to monitor Hgb and for signs of GI bleed. Discharge back to [place] when patient is medically ready.

## 2022-12-19 NOTE — PROGRESS NOTES
Potassium   Date Value Ref Range Status   12/19/2022 4.4 3.4 - 5.3 mmol/L Final   04/20/2020 3.9 3.4 - 5.3 mmol/L Final     Hemoglobin   Date Value Ref Range Status   12/19/2022 8.2 (L) 13.3 - 17.7 g/dL Final   04/20/2020 14.3 13.3 - 17.7 g/dL Final     Creatinine   Date Value Ref Range Status   12/19/2022 3.06 (H) 0.66 - 1.25 mg/dL Final   04/20/2020 1.06 0.66 - 1.25 mg/dL Final     Urea Nitrogen   Date Value Ref Range Status   12/19/2022 75 (H) 7 - 30 mg/dL Final   04/20/2020 23 7 - 30 mg/dL Final     Sodium   Date Value Ref Range Status   12/19/2022 131 (L) 133 - 144 mmol/L Final   04/20/2020 139 133 - 144 mmol/L Final     INR   Date Value Ref Range Status   12/16/2022 1.14 0.85 - 1.15 Final   10/07/2019 1.20 (H) 0.86 - 1.14 Final       DIALYSIS PROCEDURE NOTE  Hepatitis status of previous patient on machine log was checked and verified ok to use with this patients hepatitis status.  Patient dialyzed for 3 hrs. on a K3 bath with a net fluid removal of  1.3L.  A BFR of 400 ml/min was obtained via a R tunneled CVC.      The treatment plan was discussed with Dr. Fitzpatrick during the treatment.    Total heparin received during the treatment: 0 units.   Line flushed, clamped and capped with heparin 1:1000 1.9 mL (1900 units) per lumen    Meds  given: EPO   Complications: None      Person educated: Patient. Knowledge base building. Barriers to learning: none. Educated on procedure via verbal mode. Patient verbalized understanding.   ICEBOAT? Timeout performed pre-treatment  I: Patient was identified using 2 identifiers  C:  Consent Signed Yes  E: Equipment preventative maintenance is current and dialysis delivery system OK to use  B:   Latest Reference Range & Units 12/10/22 20:22   Hep B Surface Agn Nonreactive  Nonreactive   Hepatitis B Surface Antibody Instrument Value <8.00 m[IU]/mL 0.00   Hepatitis B Surface Antibody  Nonreactive   O: Dialysis orders present and complete prior to treatment  A: Vascular access  verified and assessed prior to treatment  T: Treatment was performed at a clinically appropriate time  ?: Patient was allowed to ask questions and address concerns prior to treatment    See Adult Hemodialysis flowsheet in EPIC for further details and post assessment.  Machine water alarm in place and functioning. Transducer pods intact and checked every 15min.   Pt assisted with repositioning throughout dialysis treatment.  Chlorine/Chloramine water system checked every 4 hours.      Post treatment report given to PETER Díaz regarding 1.3L of fluid removed, last BP 94/64.      Karina Nixon RN

## 2022-12-19 NOTE — CONSULTS
[facility]  WO Nurse Inpatient Assessment     Consulted for: Peg tube, trach, nose    Patient History (according to provider note(s):      Blanco Osborne is a 57 year old male with paroxysmal A. fib, Liver transplant (2016), CHRISTIAN on BiPAP, h/o CVA and hypertension. He is s/p Trach/PEG following acute resp failure and PEA due to Strep/para influenza pna. He had septic shock with MSOF and is currently on iHD. He had Rt sided Chest tube placed for hydroptx on 12/12/22. He was transferred to [place] on 12/14/22 for CIP. Was noted to have bloody stool on 12/15 and 12/16 and had left Ptx which required left sided chest tube leading to ~900ml of bloody output.   He was given 2uPRBC's and transferred to [facility].    Areas Assessed:      Areas visualized during today's visit: Peg tube, trach, nose    Wound location: Trach Site    Last photo: 12/19/22    Wound due to: Moisture Associated Skin Damage (MASD) and Surgical Wound  Wound history/plan of care: Appears to have some splitting at the suture site as well as copious amounts of mucous output  Wound base: 100 % pink with scant yellow fibrinous tissue moist tissue     Palpation of the wound bed: normal      Drainage: unable to determin due to large amounts of mucous from trach site     Description of drainage: UTD     Measurements (length x width x depth, in cm): 0.7  x 1  x  0.2 cm      Tunneling: N/A     Undermining: N/A  Periwound skin: Ecchymosis      Color: purple      Temperature: normal   Odor: none  Pain: related to moving trach only, none  Pain interventions prior to dressing change: patient tolerated well  Treatment goal: Heal   STATUS: initial assessment  Supplies ordered: supplies stored on unit     Wound location: PEG Site    Last photo: 12/19/22      Wound due to: Moisture Associated Skin Damage (MASD)  Wound history/plan of care: Patient with newer PEG tube, wound first identified at [place] with [facility] assessment. 3 scabbed areas align  where this writer had previously seen sutures in place on previous [facility] ICU admission. Site around tube seems less likely to be pressure as there is nothing on the bumper to touch here. Suspect poor healing of surgical site and erosion from PEG site drainage.   Wound base: Pink yellow moist tissue  From 8-3 o clock approximately 0.7cm with a depth of 0.1cm from ostomy. 3 superficial scabs apprioximately 0.2cm x 0.2cm each     Palpation of the wound bed: normal      Drainage: small     Description of drainage: serosanguinous     Tunneling: N/A     Undermining: N/A  Periwound skin: Intact      Color: pink      Temperature: normal   Odor: none  Pain: absent and denies , none  Pain interventions prior to dressing change: N/A  Treatment goal: Heal  and Decrease moisture  STATUS: initial assessment  Supplies ordered: supplies stored on unit, discussed with RN and discussed with patient     Wound location: Philtrum/Columella    Last photo: 12/19/22      Wound due to: Viral Lesions  Wound history/plan of care: 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  Wound base: 100 % superficial scab     Palpation of the wound bed: normal      Drainage: none     Description of drainage: none     Measurements (length x width x depth, in cm): 1.5  x 2.5  x  0 cm      Tunneling: N/A     Undermining: N/A  Periwound skin: Intact      Color: normal and consistent with surrounding tissue      Temperature: normal   Odor: none  Pain: mild, tender  Pain interventions prior to dressing change: patient tolerated well  Treatment goal: Heal   STATUS: initial assessment  Supplies ordered: supplies stored on unit      Treatment Plan:     Columella/Philtrum/Nosewound(s): Every shift   1. Cleanse site with saline. Pat dry with gauze.  2. Apply acyclovir Q8H.   Avoid vaseline     G Tube site: Daily  1. Cleanse with saline, pat dry.  2. Apply Critic-Aid barrier cream over wound.  3. Cover with Mepilex 4x4 cut like split gauze around tube.  4. Ensure that tubing is  stablized with securement device.    Trach site: Q8H and PRN with drainage.  1. Cleanse with saline pat dry.  2. Cover with Aquacel Ag ([id number]) cut like split gauze.      Orders: Written    RECOMMEND PRIMARY TEAM ORDER: None, at this time  Education provided: importance of repositioning, plan of care, wound progress, Moisture management and Off-loading pressure  Discussed plan of care with: Patient, Family and Nurse  WOC nurse follow-up plan: 1-2x weekly  Notify WOC if wound(s) deteriorate.  Nursing to notify the Provider(s) and re-consult the WOC Nurse if new skin concern.    DATA:     Current support surface: Standard  Low air loss (MIQUEL pump, Isolibrium, Pulsate, skin guard, etc)  Containment of urine/stool: Indwelling catheter  BMI: Body mass index is 26.31 kg/m .   Active diet order: Orders Placed This Encounter      NPO for Medical/Clinical Reasons Except for: Ice Chips, Meds     Output: I/O last 3 completed shifts:  In: 2310 [I.V.:1920; NG/GT:390]  Out: 58 [Chest Tube:58]     Labs: Recent Labs   Lab 12/19/22  0624 12/17/22  0519 12/16/22  2239   ALBUMIN 1.9*  --  1.7*   HGB 8.2*   < > 7.9*  7.9*   INR  --   --  1.14   WBC 4.1  --  3.9*    < > = values in this interval not displayed.     Pressure injury risk assessment:   Sensory Perception: 3-->slightly limited  Moisture: 4-->rarely moist  Activity: 1-->bedfast  Mobility: 2-->very limited  Nutrition: 2-->probably inadequate  Friction and Shear: 2-->potential problem  Kareem Score: 14    [facility]   Dept. Pager: [phone]  Dept. Office Number: [phone]

## 2022-12-19 NOTE — PROGRESS NOTES
[facility] ICU RESPIRATORY NOTE        Date of Admission: 12/16/2022    Date of Intubation (most recent): Chronic Trach    Reason for Mechanical Ventilation: Resp Failure    Met Criteria for Spontaneous Breathing Trial: Yes - PS 17/5 for 3.5 hours. %      Significant Events Today: PS trial done; place on CMV on nocs for rest    ABG Results:   Recent Labs   Lab 12/17/22  1136   O2PER 30       Current Vent Settings: Vent Mode: CMV/AC  (Continuous Mandatory Ventilation/ Assist Control)  FiO2 (%): 30 %  Resp Rate (Set): 16 breaths/min  Tidal Volume (Set, mL): 450 mL  PEEP (cm H2O): 5 cmH2O  Pressure Support (cm H2O): 17 cmH2O  Resp: 16      Skin Assessment: Redness/injury around trach site. Foam dressing in place    Plan: Continue daily weaning as tolerates.    Erica Garcia RT on 12/18/2022 at 6:09 PM

## 2022-12-19 NOTE — PROGRESS NOTES
Request Gunjan ARSHAD CTS to remove Right chest tube placed on 12/12/22 as outputs have been minimal.    After explaining the procedure and answering questions (family in the room) the chest tube was removed intact with some pain. Gauze and tape dressing placed over the site and should be changed in 24 hours and then daily until the site has healed.     Thanks, Nina [facility] Interventional Radiology CNP ([phone]) (phone [phone])

## 2022-12-19 NOTE — CONSULTS
IR consult request for a Left chest tube placement in new apical pneumothorax. Reviewed with Radiologist Dr Means today who feels the pneumo is too superior to safely reach and also very small. Unable to place a chest tube.     Please contact the IR department at [phone] for procedural related questions.     Discussed above with PETER Watson today.    Thanks, Nina [facility] Interventional Radiology CNP ([phone]) (phone [phone])

## 2022-12-19 NOTE — PLAN OF CARE
Goal Outcome Evaluation:      Plan of Care Reviewed With:  (Interdisciplinary bedside rounds)          Outcome Evaluation: TF recommendations in chart once able to resume.  See RD notes dated today for details.    Martha Sandoval RD, LD, CNSC   Clinical Dietitian - [facility]

## 2022-12-19 NOTE — PLAN OF CARE
Speech Language Therapy Discharge Summary    Reason for therapy discharge:    Discharged to acute care    Progress towards therapy goal(s). See goals on Care Plan in [facility] electronic health record for goal details.  Goals not met.  Barriers to achieving goals:   discharge from facility.    Therapy recommendation(s):    Recommend re-consult SLP when pt is medically stable and appropriate for speaking valve trials.

## 2022-12-19 NOTE — PROGRESS NOTES
[facility] ICU RESPIRATORY NOTE        Date of Admission: 12/16/2022    Date of Intubation (most recent): Chronic trach    Reason for Mechanical Ventilation: Respiratory failure    Met Criteria for Spontaneous Breathing Trial: Yes    Reason for No Spontaneous Breathing Trial: None overnight    Significant Events Today: None overnight.     ABG Results:   Recent Labs   Lab 12/17/22  1136   O2PER 30       Current Vent Settings: Vent Mode: CMV/AC  (Continuous Mandatory Ventilation/ Assist Control)  FiO2 (%): 25 %  Resp Rate (Set): 16 breaths/min  Tidal Volume (Set, mL): 450 mL  PEEP (cm H2O): 5 cmH2O  Pressure Support (cm H2O): 17 cmH2O  Resp: 15      Skin Assessment: Redness around trach, trach site is oozing secretions.      Jayson Marie, RT on 12/19/2022 at 5:11 AM

## 2022-12-19 NOTE — PROGRESS NOTES
Shift summary 1900 - 0700    Patient's night was mostly unremarkable. VSS. A/o. Afebrile. C/o rated as kenney as 10/10 even tho pt appeared to be comfortable. PRN oxy given  x3 and a one time 0.2 mg dose of dilaudid given with little effect per pt. L and R CT hooked up to wall suction with little to no output. No GI bleed noted overnight.

## 2022-12-19 NOTE — PROGRESS NOTES
GASTROENTEROLOGY PROGRESS NOTE     INTERVAL HISTORY   Chest Tube removed today  No melena or hematochezia  Endorsing diffuse abdominal pain this morning  Pt visiting with wife at bedside     OBJECTIVE:  General Appearance:  Critically ill male pt, resting in bed   /69   Pulse 64   Temp 97.5  F (36.4  C) (Oral)   Resp 16   Wt 88 kg (194 lb 0.1 oz)   SpO2 100%   BMI 26.31 kg/m    Temp (24hrs), Av.8  F (36.6  C), Min:97.5  F (36.4  C), Max:98  F (36.7  C)    Patient Vitals for the past 72 hrs:   Weight   22 88 kg (194 lb 0.1 oz)       Intake/Output Summary (Last 24 hours) at 2022 1028  Last data filed at 2022 1000  Gross per 24 hour   Intake 1670 ml   Output 8 ml   Net 1662 ml        PHYSICAL EXAM     Constitutional: alert and no distress   Cardiovascular: Regular rate, rhythm   Respiratory: O2 via trach, non-labored, some secretions noted around trach  Abdomen: Distended, tender throughout, + bowel sounds            Additional Comments:  ROS, FH, SH: See initial GI consult for details.     I have reviewed the patient's new clinical lab results:     Recent Labs   Lab Test 22  0624 22  0039 22  1816 22  0519 22  1620 22  1448 22  0513   WBC 4.1  --   --   --  3.9* 4.5   < > 8.0   HGB 8.2* 8.2* 7.4*   < > 7.9*  7.9* 7.0*   < > 7.8*   *  --   --   --  106* 107*   < > 113*   PLT 84*  --   --   --  94* 111*   < > 76*   INR  --   --   --   --  1.14 1.58*  --  1.16*    < > = values in this interval not displayed.     Recent Labs   Lab Test 22  0624 22  1620   POTASSIUM 4.4 4.0 4.2   CHLORIDE 94 99 98   CO2 26 26 26   BUN 75* 114* 108*   ANIONGAP 11 10 12     Recent Labs   Lab Test 22  0624 22  2239 22  0626 22  0427 22  0431 22  1947 22  1859 07/10/18  0720 10/31/17  1038 16  0611 16  1055 16  0720 16  0741 16  0034  09/20/16  1259   ALBUMIN 1.9* 1.7* 2.2*   < > 1.6*   < >  --    < >  --    < >  --    < > 2.8*   < > 3.8   BILITOTAL  --  0.9 0.6  --  0.8   < >  --    < >  --    < >  --    < > 2.3*   < > 6.8*   ALT  --  20 16  --  15   < >  --    < >  --    < >  --    < > 726*   < > 26   AST  --  21 22  --  21   < >  --    < >  --    < >  --    < > 570*   < > 32   PROTEIN  --   --   --   --   --   --  70*  --  Negative  --  Negative   < >  --   --   --    LIPASE  --   --   --   --   --   --   --   --   --   --   --   --  63*  --   --    AMYLASE  --   --   --   --   --   --   --   --   --   --   --   --  72  --  86    < > = values in this interval not displayed.        Assessment & Plan     56 y/o male s/p liver transplant at [facility] 2016, s/p trach/ PEG following respiratory failure and PEA due to strep/parainfluenza pneumonia complicated by MSOF requiring HD, initially discharge to [place] 12/14/22 after prolonged hospitalization but was then noted to have bloody stool and right sided hemothorax and was transferred to [facility].     He has had no further bloody bowel movements since admission and hemoglobin level is stable. Subsequently, no further endoscopic evaluation noted at this time. However, he is endorsing abdominal pain this morning throughout the abdomen- unclear what the source of this is. Given concern for ascites, would recommend US guided paracentesis to assess for SBP. However, as pt prefers not to proceed with paracentesis if not needed, it is reasonable to proceed with CT abdomen and Pelvis without contrast and hold off if no significant ascites noted. However, if this shows significant ascites I would recommend proceeding with paracentesis.     Recommendations:  - Agree with CT Abdomen pelvis to assess abdominal pain   - US guided paracentesis to check for SBP if significant ascites on CT scan   -Monitor stool, hgb, transfuse PRN   -Immunosuppression per [facility] transplant team   -No plan for further endoscopic  eval    Addendum  - Recommend restarting lactulose as pt was on this prior to admit- titrate to 3-4 soft bowel movements daily    Discussed with Dr. Mccrary and Dr. Valenzuela    Time spent: 35 minutes, greater than 50% of the visit was spent in counseling/coordination of care.     Kelly Hernandez PA-C  [facility] ([facility])  Office: [phone]

## 2022-12-19 NOTE — PROGRESS NOTES
THORACIC SURGERY    Chart review and patient seen. Thoracic surgery consulted to assist with management of left and right hemopneumothorax and chest tubes.     First admission 11/12 - 12/15. Discharged to [place]. Now readmitted 12/16 due to hematochezia and now left sided hydropneumothorax. On Eliquis for afib, holding now.    Right chest tube first placed on 11/16 in ICU, removed and then replaced by IR on 12/12. Over the past few days, there has been no output. No air leak. Dressing intact, no kinking in tubing. Will plan to have IR removed right chest tube.    Left chest tube placed in ED on 12/16 initially with about 900 ml bloody output. Reviewed CT chest from 12/17. Persistent small left pneumothorax. No air leak noted from [facility]. Will plan to clamp left chest tube at midnight tonight, CXR in AM, unclamp after CXR and return to suction.    We will follow.    Gunjan Gonzales PA-C with Dr. Nilesh Jackson  [facility]  Office: [phone]  Cell: [phone]

## 2022-12-19 NOTE — CONSULTS
CLINICAL NUTRITION SERVICES  -  ASSESSMENT NOTE      Future/Additional Recommendations: Once able to resume TF, recommend previous goal of Novasource Renal at 60 mL/hr= 2880 kcal, 131 g protein, 264 g CHO, 0 g fiber, 1032 mL H2O  Total with D5 IVF = 3084 kcal (35 kcal/kg)   Malnutrition: % Weight Loss:  > 5% in 1 month (severe malnutrition)  % Intake:  </= 50% for >/= 5 days (severe malnutrition)(will meet 12/20)  Subcutaneous Fat Loss:  Orbital region severe depletion, Upper arm region severe depletion and Thoracic region severe depletion  Muscle Loss:  Temporal region severe depletion, Clavicle bone region severe depletion, Patellar region severe depletion, Anterior thigh region severe depletion and Posterior calf region severe depletion  Fluid Retention:  Mild as above     Malnutrition Diagnosis: Severe malnutrition  In Context of:  Acute illness or injury        REASON FOR ASSESSMENT  Blanco Osborne is a 57 year old male seen by Registered Dietitian for Provider Order - Nutrition Assessment and Provider Order - Registered Dietitian to Assess and Order TF per Medical Nutrition Therapy Protocol      NUTRITION HISTORY  - Information obtained from Epic - patient is well known to our services d/t a recent 1 month hospital stay (discharged 12/15 and re-admitted 12/16).    - Initially on evaluation (11/14), history was obtained from wife and brother as follows ~  - Noted patient was dealing with an upper respiratory infection for ~1 week prior to admission and not feeling well - suspect some decreased appetite from baseline during this time. Admitted after an out of hospital cardiac arrest.   - Family state that patient has been dieting for the past year or so in attempt to lose weight. He mostly eats protein bars and drinks protein shakes (Ensure Max). Will have some cantaloupe in the morning with his medications but not much. Him and his spouse will dine out a few times during the week and patient will eat some  "fish or prime rib otherwise sounds to have pretty minimal oral intake by choice.    - No known food allergies noted.     - During hospitalization, patient was reliant on TF and eventually had 14-Filipino G-tube placed.    - TF regimen on discharge was as follows ~  Novasource Renal at 60 mL/hr= 2880 kcal, 131 g protein, 264 g CHO, 0 g fiber, 1032 mL H2O  Flushes 30 mL every 4 hours.   - Appears that patient was also receiving this above TF regimen at [place].      CURRENT NUTRITION ORDERS  Diet Order:     NPO   TF has been off d/t possible GIB    Current Intake/Tolerance:  N/A      NUTRITION FOCUSED PHYSICAL ASSESSMENT FOR DIAGNOSING MALNUTRITION)  Yes         Observed:    Muscle wasting (refer to documentation in Malnutrition section) and Subcutaneous fat loss (refer to documentation in Malnutrition section)    Obtained from Chart/Interdisciplinary Team:  Edema trace (1+)    ANTHROPOMETRICS  Height: 6'0\"  Weight: 88 kg (194#)(12/16)  Body mass index is 26.31 kg/m   Weight Status:  Overweight BMI 25-29.9  IBW: 80.9 kg   % IBW: 109%  Weight History:   Wt Readings from Last 10 Encounters:   12/16/22 88 kg (194 lb 0.1 oz)   12/16/22 100.2 kg (221 lb)   12/16/22 100.2 kg (221 lb)   12/15/22 87 kg (191 lb 12.8 oz)   10/07/19 137.5 kg (303 lb 3.2 oz)   10/04/19 131.5 kg (290 lb)   02/20/19 140.4 kg (309 lb 9.6 oz)   07/10/18 137.5 kg (303 lb 3.2 oz)   10/31/17 133.8 kg (295 lb)   10/17/17 (!) 136.7 kg (301 lb 4.8 oz)     Patient was 236# on last hospital admit (11/14/22) --> down 42# or 18% over the last month     LABS  Na 131 (L)  BUN 75 (H), Cr 3.06 (H), Phos 5.7 (H) - HD reliant   NH3 38 <-- 113 (12/16)    MEDICATIONS  D5 at 50 mL/hr= 60 g CHO, 204 kcal   MVI, Folic Acid   Lactulose for elevated NH3      ASSESSED NUTRITION NEEDS PER APPROVED PRACTICE GUIDELINES:    Dosing Weight 88 kg   Estimated Energy Needs: 3228-1316 kcals (30-35 Kcal/Kg)  Justification: repletion   Estimated Protein Needs: > 130 grams protein (> 1.5 " g/kg g pro/Kg)  Justification: hypercatabolism with acute illness and dialysis, repletion   Estimated Fluid Needs: 3849-5353 mL (1 mL/Kcal)  Justification: maintenance    MALNUTRITION:  % Weight Loss:  > 5% in 1 month (severe malnutrition)  % Intake:  </= 50% for >/= 5 days (severe malnutrition)(will meet 12/20)  Subcutaneous Fat Loss:  Orbital region severe depletion, Upper arm region severe depletion and Thoracic region severe depletion  Muscle Loss:  Temporal region severe depletion, Clavicle bone region severe depletion, Patellar region severe depletion, Anterior thigh region severe depletion and Posterior calf region severe depletion  Fluid Retention:  Mild as above     Malnutrition Diagnosis: Severe malnutrition  In Context of:  Acute illness or injury    NUTRITION DIAGNOSIS:  Inadequate protein-energy intake related to NPO. TF on hold as evidenced by meeting 0% protein and 6% energy needs from D5 IVF       NUTRITION INTERVENTIONS  Recommendations / Nutrition Prescription  Once able to resume TF, recommend previous goal of Novasource Renal at 60 mL/hr= 2880 kcal, 131 g protein, 264 g CHO, 0 g fiber, 1032 mL H2O  Total with D5 IVF = 3084 kcal (35 kcal/kg)    Implementation  Nutrition education: Not appropriate at this time due to patient condition  Collaboration and Referral of Nutrition care:  Patient discussed today during interdisciplinary bedside rounds.  Plan to hold on TF resume at this time (until approved by gurwinder BILLY).    Nutrition Goals  TF will resume within the next 24 hours     MONITORING AND EVALUATION:  Progress towards goals will be monitored and evaluated per protocol and Practice Guidelines    Martha Sandoval RD, LD, CNSC   Clinical Dietitian - [facility]

## 2022-12-19 NOTE — PROGRESS NOTES
[facility] ICU RESPIRATORY NOTE        Date of Admission: 12/16/2022    Date of Intubation (most recent):  Chronic trach    Reason for Mechanical Ventilation: Resp failure    Number of Days on Mechanical Ventilation: 4    Met Criteria for Spontaneous Breathing Trial: Yes PS 10/5 for 8 hrs    Significant Events Today: None    ABG Results:   Recent Labs   Lab 12/17/22  1136   O2PER 30       Current Vent Settings: Vent Mode: CMV/AC  (Continuous Mandatory Ventilation/ Assist Control)  FiO2 (%): 25 %  Resp Rate (Set): 16 breaths/min  Tidal Volume (Set, mL): 450 mL  PEEP (cm H2O): 5 cmH2O  Pressure Support (cm H2O): 10 cmH2O  Resp: 20      Skin Assessment: Intact    Plan: Pt to remain on full vent support overnight    Cheng Douglass RT on 12/19/2022 at 5:56 PM

## 2022-12-19 NOTE — PROGRESS NOTES
[facility]    Nephrology Progress Note    Assessment & Plan     <ANURIC LORETTA POST PEA ARREST     ~57 year old man with CARRILLO post OLT cirrosis. Admitted 11/12/2022 with OOH PEA arrest, respiratory failure. Course marked by paraflu pna, strep bacteremia.     ~Discharged 12/15. Readmitted 12/18 with hematochezia and left sided hemothorax. Post left chest tube.     ~Was on CRRT which was stopped on 11/26 and IHD started 11/29.     - On MWF schedule, 3 hours, 1-1.5 L UF, EPO 10,000 units.     <HEADACHE    ~Ongoing problems with headache. Ofe wondered if it is related to dialysis. It does not seem to be temporally associated with the treatments.    ~Unlikely dialysis related headaches, but pattern will be followed.      <ANEMIA OF RENAL DISEASE    ~On retacrit 10,000k QHD.     <SEPTIC SHOCK WITH MODS    ~Midodrine TID for hypotension.       <RESPIRATORY FAILURE, TRACHEOSTOMY, BILATERAL CHEST TUBES. HEMOPNEMOTHORAX.    ~Hisory of aspergillus pneumonia. On voriconazole in past.         <CARRILLO/ESLD, S/P OLT    ~On tac 1mg BID. Dosing per GI.             Interval History     Up in chair, alert, able to communicate. Is somewhat tired and ready for a nap.     Has been complaining of head pain which seems a bit hard to qualify. Ofe wonders if they may be related to dialysis.     No issues with dialysis.       Temp: 97.5  F (36.4  C) Temp src: Oral BP: 126/74 Pulse: 64   Resp: 16 SpO2: 100 % O2 Device: Mechanical Ventilator      Vitals:    12/16/22 2035   Weight: 88 kg (194 lb 0.1 oz)       Vital Signs with Ranges    Temp:  [97.5  F (36.4  C)-98  F (36.7  C)] 97.5  F (36.4  C)  Pulse:  [59-85] 64  Resp:  [15-46] 16  BP: ()/() 126/74  FiO2 (%):  [25 %-30 %] 25 %  SpO2:  [94 %-100 %] 100 %    I/O last 3 completed shifts:  In: 2310 [I.V.:1920; NG/GT:390]  Out: 58 [Chest Tube:58]    Physical Exam    BP Readings from Last 5 Encounters:   12/19/22 126/74   12/16/22 101/63   12/16/22 117/75    12/15/22 117/82   10/07/19 (!) 148/92        Wt Readings from Last 10 Encounters:   12/16/22 88 kg (194 lb 0.1 oz)   12/16/22 100.2 kg (221 lb)   12/16/22 100.2 kg (221 lb)   12/15/22 87 kg (191 lb 12.8 oz)   10/07/19 137.5 kg (303 lb 3.2 oz)   10/04/19 131.5 kg (290 lb)   02/20/19 140.4 kg (309 lb 9.6 oz)   07/10/18 137.5 kg (303 lb 3.2 oz)   10/31/17 133.8 kg (295 lb)   10/17/17 (!) 136.7 kg (301 lb 4.8 oz)       GENERAL:      [] Alert, in no apparent distress.     Trach in place    [] Intubated, sedated  [] Speech fluent, attentive.  Forming words with lips.   HEENT:    []  Normocephalic. No gross abnormalities.    [x]The mouth moist.    [] Icteric  NECK:     The jugular venous pressure is [] Normal  [] Elevated [x] Not visible    CV:      [x] RRR [] IRR S1 S2 No murmur or rub detected.    RESP:     [x]  Decreased breath sounds bilaterally with no audible crackle.   []  Breathing normal, unlabored.    GI:    [x]  Abdomen soft/non-tender/non-distended.   []  No masses, organomegaly    MUSCULOSKELETAL:     []  Extremities normal - no gross deformities noted.     Edema: [x] None []  1+ [] 2+ []  3+ [] 4+ edema. [] Anasarca    ACCESS:     SKIN:     []  No new lesions or rashes, dry to touch.   [x]  Pale   []  Jaundiced    NEURO:      [x]  No overt deficit.     PSYCH:   Appears fatigued  [] mood good, affect appropriate.   []  Unable to assess    Medications       D5W 50 mL/hr at 12/19/22 1106     pantoprazole (PROTONIX) infusion ADULT/PEDS GREATER than or EQUAL to 45 kg 8 mg/hr (12/19/22 0400)         - MEDICATION INSTRUCTIONS for Dialysis Patients -   Does not apply See Admin Instructions     acetylcysteine  2 mL Nebulization 4x daily     carboxymethylcellulose PF  1 drop Both Eyes TID     chlorhexidine  15 mL Mouth/Throat Q12H     epoetin mirta-epbx  10,000 Units Subcutaneous Once per day on Mon Wed Fri     folic acid  1 mg Intravenous Daily     hydrocortisone sodium succinate PF  25 mg Intravenous Q6H     insulin  aspart  1-6 Units Subcutaneous Q4H     [Auto Hold] insulin glargine  30 Units Subcutaneous QAM AC     ipratropium - albuterol 0.5 mg/2.5 mg/3 mL  3 mL Nebulization 4x daily     [Held by provider] lactulose  20 g Oral or Feeding Tube TID     midodrine  10 mg Oral or Feeding Tube TID w/meals     multivitamins w/minerals  15 mL Per Feeding Tube Daily     nystatin  500,000 Units Swish & Swallow 4x Daily     rifaximin  550 mg Per Feeding Tube BID     sodium chloride (PF)  10-40 mL Intracatheter Q8H     tacrolimus  1 mg Oral or Feeding Tube BID       Data     UA RESULTS:  Recent Labs   Lab Test 11/25/22  1859 10/31/17  1038   COLOR Yellow [place]   APPEARANCE Slightly Cloudy* Slightly Cloudy   URINEGLC Negative Negative   URINEBILI Negative Negative   URINEKETONE Negative 5*   SG 1.017 1.021   UBLD Moderate* Negative   URINEPH 5.5 5.0   PROTEIN 70* Negative   NITRITE Negative Negative   LEUKEST Small* Negative   RBCU  --  <1   WBCU  --  1      BMP  Recent Labs   Lab 12/19/22  0940 12/19/22  0624 12/19/22  0412 12/19/22  0021 12/17/22  0253 12/16/22  2239 12/16/22  2027 12/16/22  1620 12/16/22  1139 12/16/22  0932 12/16/22  0626   NA  --  131*  --   --   --  135  --  136  --  135* 137   POTASSIUM  --  4.4  --   --   --  4.0  --  4.2  --  4.0 4.3   CHLORIDE  --  94  --   --   --  99  --  98  --   --  98   KELLY  --  7.8*  --   --   --  8.0*  --  8.3*  --   --  8.1*   CO2  --  26  --   --   --  26  --  26  --   --  26   BUN  --  75*  --   --   --  114*  --  108*  --   --  96.6*   CR  --  3.06*  --   --   --  3.35*  --  3.59*  --   --  3.10*   * 133* 124* 138*   < > 104*   < > 179*   < > 149* 167*    < > = values in this interval not displayed.     [email or web address](phos:4)  CBC)  Recent Labs   Lab 12/19/22  0624 12/19/22  0039 12/18/22  1816 12/18/22  1226 12/17/22  0519 12/16/22  2239 12/16/22  1620 12/16/22  1129 12/16/22  0932 12/16/22  0626   WBC 4.1  --   --   --   --  3.9* 4.5  --   --  3.4*   HGB 8.2* 8.2* 7.4* 7.9*    < > 7.9*  7.9* 7.0* 7.0*   < > 6.6*   HCT 26.0*  --   --   --   --  25.7* 23.3* 23.1*  --  22.3*   *  --   --   --   --  106* 107*  --   --  109*   PLT 84*  --   --   --   --  94* 111*  --   --  83*    < > = values in this interval not displayed.     Lab Results   Component Value Date    ALBUMIN 1.9 12/19/2022    ALBUMIN 1.7 12/16/2022    ALBUMIN 2.2 12/16/2022    ALBUMIN 1.8 12/15/2022    ALBUMIN 3.9 04/20/2020    ALBUMIN 3.8 10/07/2019    ALBUMIN 3.8 02/20/2019        Recent Labs   Lab 12/19/22  0624 12/15/22  0414 12/14/22  0456 12/14/22  0412 12/13/22  0427   HGB 8.2*   < >  --    < > 6.5*   HCT 26.0*   < >  --    < > 22.0*   *   < >  --    < > 113*   IRON  --   --   --   --  37   IRONSAT  --   --   --   --  27   RETICABSCT  --   --   --   --  0.124*   RETP  --   --   --   --  6.5*   FEB  --   --   --   --  138*   HOSSEIN  --   --   --   --  212   FOLIC  --   --  >40.0*  --   --     < > = values in this interval not displayed.       Recent Labs   Lab 12/18/22  0013 12/16/22 2239   AST  --  21   ALT  --  20   ALKPHOS  --  251*   BILITOTAL  --  0.9   CHANDLER 38 113*     Recent Labs   Lab 12/16/22 2239   INR 1.14     25 OH Vit D2   Date Value Ref Range Status   09/24/2016 <5 ug/L Final     25 OH Vit D3   Date Value Ref Range Status   09/24/2016 8 ug/L Final     25 OH Vit D total   Date Value Ref Range Status   09/24/2016 (L) 20 - 75 ug/L Final    <13  Season, race, dietary intake, and treatment affect the concentration of   25-hydroxy-Vitamin D. Values may decrease during winter months and increase   during summer months. Values 20-29 ug/L may indicate Vitamin D insufficiency   and values <20 ug/L may indicate Vitamin D deficiency.   This test was developed and its performance characteristics determined by the   [facility],  [facility]. It has   not been cleared or approved by the FDA. The laboratory is regulated under CLIA   as qualified to perform  high-complexity testing. This test is used for clinical   purposes. It should not be regarded as investigational or for research.       No results for input(s): PTHI in the last 168 hours.    Attestation:   I have reviewed today's relevant vital signs, notes, medications, labs and imaging.

## 2022-12-20 ENCOUNTER — APPOINTMENT (OUTPATIENT)
Dept: CT IMAGING | Facility: CLINIC | Age: 57
DRG: 207 | End: 2022-12-20
Attending: INTERNAL MEDICINE
Payer: COMMERCIAL

## 2022-12-20 ENCOUNTER — APPOINTMENT (OUTPATIENT)
Dept: MRI IMAGING | Facility: CLINIC | Age: 57
DRG: 207 | End: 2022-12-20
Attending: SURGERY
Payer: COMMERCIAL

## 2022-12-20 ENCOUNTER — APPOINTMENT (OUTPATIENT)
Dept: GENERAL RADIOLOGY | Facility: CLINIC | Age: 57
DRG: 207 | End: 2022-12-20
Attending: THORACIC SURGERY (CARDIOTHORACIC VASCULAR SURGERY)
Payer: COMMERCIAL

## 2022-12-20 ENCOUNTER — APPOINTMENT (OUTPATIENT)
Dept: CARDIOLOGY | Facility: CLINIC | Age: 57
DRG: 207 | End: 2022-12-20
Attending: INTERNAL MEDICINE
Payer: COMMERCIAL

## 2022-12-20 ENCOUNTER — APPOINTMENT (OUTPATIENT)
Dept: GENERAL RADIOLOGY | Facility: CLINIC | Age: 57
DRG: 207 | End: 2022-12-20
Attending: INTERNAL MEDICINE
Payer: COMMERCIAL

## 2022-12-20 ENCOUNTER — HOSPITAL ENCOUNTER (INPATIENT)
Dept: NEUROLOGY | Facility: CLINIC | Age: 57
Discharge: HOME | DRG: 207 | End: 2022-12-20
Attending: SURGERY | Admitting: INTERNAL MEDICINE
Payer: COMMERCIAL

## 2022-12-20 LAB
ALBUMIN SERPL-MCNC: 2 G/DL (ref 3.4–5)
ALLEN'S TEST: YES
ALP SERPL-CCNC: 147 U/L (ref 40–150)
ALT SERPL W P-5'-P-CCNC: 19 U/L (ref 0–70)
AMMONIA PLAS-SCNC: 32 UMOL/L (ref 10–50)
ANION GAP SERPL CALCULATED.3IONS-SCNC: 7 MMOL/L (ref 3–14)
AST SERPL W P-5'-P-CCNC: 20 U/L (ref 0–45)
BACTERIA SPT CULT: ABNORMAL
BACTERIA SPT CULT: ABNORMAL
BASE EXCESS BLDA CALC-SCNC: 2.1 MMOL/L (ref -9–1.8)
BASOPHILS # BLD AUTO: 0 10E3/UL (ref 0–0.2)
BASOPHILS NFR BLD AUTO: 0 %
BILIRUB SERPL-MCNC: 1.8 MG/DL (ref 0.2–1.3)
BUN SERPL-MCNC: 50 MG/DL (ref 7–30)
CALCIUM SERPL-MCNC: 7.6 MG/DL (ref 8.5–10.1)
CHLORIDE BLD-SCNC: 96 MMOL/L (ref 94–109)
CO2 SERPL-SCNC: 28 MMOL/L (ref 20–32)
CREAT SERPL-MCNC: 2.22 MG/DL (ref 0.66–1.25)
EOSINOPHIL # BLD AUTO: 0 10E3/UL (ref 0–0.7)
EOSINOPHIL NFR BLD AUTO: 0 %
ERYTHROCYTE [DISTWIDTH] IN BLOOD BY AUTOMATED COUNT: 17.9 % (ref 10–15)
GFR SERPL CREATININE-BSD FRML MDRD: 34 ML/MIN/1.73M2
GLUCOSE BLD-MCNC: 129 MG/DL (ref 70–99)
GLUCOSE BLDC GLUCOMTR-MCNC: 102 MG/DL (ref 70–99)
GLUCOSE BLDC GLUCOMTR-MCNC: 114 MG/DL (ref 70–99)
GLUCOSE BLDC GLUCOMTR-MCNC: 115 MG/DL (ref 70–99)
GLUCOSE BLDC GLUCOMTR-MCNC: 121 MG/DL (ref 70–99)
GLUCOSE BLDC GLUCOMTR-MCNC: 132 MG/DL (ref 70–99)
GRAM STAIN RESULT: ABNORMAL
HCO3 BLD-SCNC: 27 MMOL/L (ref 21–28)
HCT VFR BLD AUTO: 26.5 % (ref 40–53)
HGB BLD-MCNC: 8.2 G/DL (ref 13.3–17.7)
IMM GRANULOCYTES # BLD: 0 10E3/UL
IMM GRANULOCYTES NFR BLD: 1 %
LVEF ECHO: NORMAL
LYMPHOCYTES # BLD AUTO: 0.4 10E3/UL (ref 0.8–5.3)
LYMPHOCYTES NFR BLD AUTO: 8 %
MCH RBC QN AUTO: 31.5 PG (ref 26.5–33)
MCHC RBC AUTO-ENTMCNC: 30.9 G/DL (ref 31.5–36.5)
MCV RBC AUTO: 102 FL (ref 78–100)
MONOCYTES # BLD AUTO: 0.3 10E3/UL (ref 0–1.3)
MONOCYTES NFR BLD AUTO: 6 %
NEUTROPHILS # BLD AUTO: 3.6 10E3/UL (ref 1.6–8.3)
NEUTROPHILS NFR BLD AUTO: 85 %
NRBC # BLD AUTO: 0 10E3/UL
NRBC BLD AUTO-RTO: 0 /100
O2/TOTAL GAS SETTING VFR VENT: 30 %
PCO2 BLD: 40 MM HG (ref 35–45)
PH BLD: 7.43 [PH] (ref 7.35–7.45)
PLATELET # BLD AUTO: 84 10E3/UL (ref 150–450)
PO2 BLD: 118 MM HG (ref 80–105)
POTASSIUM BLD-SCNC: 3.8 MMOL/L (ref 3.4–5.3)
PROT SERPL-MCNC: 5 G/DL (ref 6.8–8.8)
RBC # BLD AUTO: 2.6 10E6/UL (ref 4.4–5.9)
SODIUM SERPL-SCNC: 131 MMOL/L (ref 133–144)
WBC # BLD AUTO: 4.3 10E3/UL (ref 4–11)

## 2022-12-20 PROCEDURE — 250N000012 HC RX MED GY IP 250 OP 636 PS 637: Performed by: INTERNAL MEDICINE

## 2022-12-20 PROCEDURE — 80053 COMPREHEN METABOLIC PANEL: CPT | Performed by: INTERNAL MEDICINE

## 2022-12-20 PROCEDURE — 999N000009 HC STATISTIC AIRWAY CARE

## 2022-12-20 PROCEDURE — 70553 MRI BRAIN STEM W/O & W/DYE: CPT

## 2022-12-20 PROCEDURE — 93325 DOPPLER ECHO COLOR FLOW MAPG: CPT | Mod: 26 | Performed by: INTERNAL MEDICINE

## 2022-12-20 PROCEDURE — 94640 AIRWAY INHALATION TREATMENT: CPT | Mod: 76

## 2022-12-20 PROCEDURE — 82140 ASSAY OF AMMONIA: CPT | Performed by: INTERNAL MEDICINE

## 2022-12-20 PROCEDURE — 250N000011 HC RX IP 250 OP 636

## 2022-12-20 PROCEDURE — P9047 ALBUMIN (HUMAN), 25%, 50ML: HCPCS | Performed by: PHYSICIAN ASSISTANT

## 2022-12-20 PROCEDURE — 99291 CRITICAL CARE FIRST HOUR: CPT | Mod: GC | Performed by: PSYCHIATRY & NEUROLOGY

## 2022-12-20 PROCEDURE — 250N000011 HC RX IP 250 OP 636: Performed by: INTERNAL MEDICINE

## 2022-12-20 PROCEDURE — 250N000013 HC RX MED GY IP 250 OP 250 PS 637: Performed by: INTERNAL MEDICINE

## 2022-12-20 PROCEDURE — 94640 AIRWAY INHALATION TREATMENT: CPT

## 2022-12-20 PROCEDURE — 71275 CT ANGIOGRAPHY CHEST: CPT

## 2022-12-20 PROCEDURE — 255N000002 HC RX 255 OP 636: Performed by: SURGERY

## 2022-12-20 PROCEDURE — 250N000011 HC RX IP 250 OP 636: Performed by: PHYSICIAN ASSISTANT

## 2022-12-20 PROCEDURE — 258N000003 HC RX IP 258 OP 636: Performed by: INTERNAL MEDICINE

## 2022-12-20 PROCEDURE — 36620 INSERTION CATHETER ARTERY: CPT | Mod: GC | Performed by: INTERNAL MEDICINE

## 2022-12-20 PROCEDURE — 31622 DX BRONCHOSCOPE/WASH: CPT | Performed by: INTERNAL MEDICINE

## 2022-12-20 PROCEDURE — 250N000009 HC RX 250: Performed by: SURGERY

## 2022-12-20 PROCEDURE — 99291 CRITICAL CARE FIRST HOUR: CPT | Mod: 25 | Performed by: INTERNAL MEDICINE

## 2022-12-20 PROCEDURE — C9113 INJ PANTOPRAZOLE SODIUM, VIA: HCPCS | Performed by: INTERNAL MEDICINE

## 2022-12-20 PROCEDURE — 93308 TTE F-UP OR LMTD: CPT | Mod: 26 | Performed by: INTERNAL MEDICINE

## 2022-12-20 PROCEDURE — 82803 BLOOD GASES ANY COMBINATION: CPT | Performed by: INTERNAL MEDICINE

## 2022-12-20 PROCEDURE — 250N000009 HC RX 250: Performed by: INTERNAL MEDICINE

## 2022-12-20 PROCEDURE — 250N000011 HC RX IP 250 OP 636: Performed by: SURGERY

## 2022-12-20 PROCEDURE — 71045 X-RAY EXAM CHEST 1 VIEW: CPT

## 2022-12-20 PROCEDURE — 94667 MNPJ CHEST WALL 1ST: CPT

## 2022-12-20 PROCEDURE — 93321 DOPPLER ECHO F-UP/LMTD STD: CPT

## 2022-12-20 PROCEDURE — 999N000157 HC STATISTIC RCP TIME EA 10 MIN

## 2022-12-20 PROCEDURE — 93321 DOPPLER ECHO F-UP/LMTD STD: CPT | Mod: 26 | Performed by: INTERNAL MEDICINE

## 2022-12-20 PROCEDURE — 94668 MNPJ CHEST WALL SBSQ: CPT

## 2022-12-20 PROCEDURE — 99292 CRITICAL CARE ADDL 30 MIN: CPT | Mod: 25 | Performed by: INTERNAL MEDICINE

## 2022-12-20 PROCEDURE — 200N000001 HC R&B ICU

## 2022-12-20 PROCEDURE — 250N000009 HC RX 250: Performed by: HOSPITALIST

## 2022-12-20 PROCEDURE — A9585 GADOBUTROL INJECTION: HCPCS | Performed by: SURGERY

## 2022-12-20 PROCEDURE — 99233 SBSQ HOSP IP/OBS HIGH 50: CPT | Performed by: STUDENT IN AN ORGANIZED HEALTH CARE EDUCATION/TRAINING PROGRAM

## 2022-12-20 PROCEDURE — 70450 CT HEAD/BRAIN W/O DYE: CPT

## 2022-12-20 PROCEDURE — 95720 EEG PHY/QHP EA INCR W/VEEG: CPT | Performed by: PSYCHIATRY & NEUROLOGY

## 2022-12-20 PROCEDURE — 93325 DOPPLER ECHO COLOR FLOW MAPG: CPT

## 2022-12-20 PROCEDURE — 71045 X-RAY EXAM CHEST 1 VIEW: CPT | Mod: 77

## 2022-12-20 PROCEDURE — 94003 VENT MGMT INPAT SUBQ DAY: CPT

## 2022-12-20 PROCEDURE — 85025 COMPLETE CBC W/AUTO DIFF WBC: CPT | Performed by: INTERNAL MEDICINE

## 2022-12-20 PROCEDURE — 31645 BRNCHSC W/THER ASPIR 1ST: CPT | Mod: GC | Performed by: INTERNAL MEDICINE

## 2022-12-20 PROCEDURE — 95714 VEEG EA 12-26 HR UNMNTR: CPT

## 2022-12-20 PROCEDURE — 31622 DX BRONCHOSCOPE/WASH: CPT | Mod: GC | Performed by: INTERNAL MEDICINE

## 2022-12-20 PROCEDURE — 0BJ08ZZ INSPECTION OF TRACHEOBRONCHIAL TREE, VIA NATURAL OR ARTIFICIAL OPENING ENDOSCOPIC: ICD-10-PCS | Performed by: INTERNAL MEDICINE

## 2022-12-20 RX ORDER — ACYCLOVIR 50 MG/G
OINTMENT TOPICAL EVERY 8 HOURS
Status: DISCONTINUED | OUTPATIENT
Start: 2022-12-20 | End: 2022-12-21

## 2022-12-20 RX ORDER — LEVETIRACETAM 10 MG/ML
1000 INJECTION INTRAVASCULAR EVERY 24 HOURS
Status: DISCONTINUED | OUTPATIENT
Start: 2022-12-21 | End: 2022-12-25

## 2022-12-20 RX ORDER — IOPAMIDOL 755 MG/ML
74 INJECTION, SOLUTION INTRAVASCULAR ONCE
Status: COMPLETED | OUTPATIENT
Start: 2022-12-20 | End: 2022-12-20

## 2022-12-20 RX ORDER — NOREPINEPHRINE BITARTRATE 0.02 MG/ML
.01-.6 INJECTION, SOLUTION INTRAVENOUS CONTINUOUS
Status: DISCONTINUED | OUTPATIENT
Start: 2022-12-20 | End: 2022-12-23

## 2022-12-20 RX ORDER — LORAZEPAM 2 MG/ML
2 INJECTION INTRAMUSCULAR ONCE
Status: COMPLETED | OUTPATIENT
Start: 2022-12-20 | End: 2022-12-20

## 2022-12-20 RX ORDER — PROPOFOL 10 MG/ML
5-75 INJECTION, EMULSION INTRAVENOUS CONTINUOUS
Status: DISCONTINUED | OUTPATIENT
Start: 2022-12-20 | End: 2022-12-23

## 2022-12-20 RX ORDER — ALBUMIN (HUMAN) 12.5 G/50ML
12.5 SOLUTION INTRAVENOUS ONCE
Status: COMPLETED | OUTPATIENT
Start: 2022-12-20 | End: 2022-12-20

## 2022-12-20 RX ORDER — LORAZEPAM 2 MG/ML
2 INJECTION INTRAMUSCULAR EVERY 5 MIN PRN
Status: COMPLETED | OUTPATIENT
Start: 2022-12-20 | End: 2022-12-21

## 2022-12-20 RX ORDER — GADOBUTROL 604.72 MG/ML
9 INJECTION INTRAVENOUS ONCE
Status: COMPLETED | OUTPATIENT
Start: 2022-12-20 | End: 2022-12-20

## 2022-12-20 RX ORDER — LORAZEPAM 2 MG/ML
INJECTION INTRAMUSCULAR
Status: COMPLETED
Start: 2022-12-20 | End: 2022-12-20

## 2022-12-20 RX ADMIN — SODIUM CHLORIDE, POTASSIUM CHLORIDE, SODIUM LACTATE AND CALCIUM CHLORIDE 500 ML: 600; 310; 30; 20 INJECTION, SOLUTION INTRAVENOUS at 06:38

## 2022-12-20 RX ADMIN — SODIUM CHLORIDE 8 MG/HR: 9 INJECTION, SOLUTION INTRAVENOUS at 01:36

## 2022-12-20 RX ADMIN — NYSTATIN 500000 UNITS: 100000 SUSPENSION ORAL at 15:50

## 2022-12-20 RX ADMIN — NYSTATIN 500000 UNITS: 100000 SUSPENSION ORAL at 08:57

## 2022-12-20 RX ADMIN — MIDODRINE HYDROCHLORIDE 10 MG: 5 TABLET ORAL at 08:57

## 2022-12-20 RX ADMIN — NYSTATIN 500000 UNITS: 100000 SUSPENSION ORAL at 22:27

## 2022-12-20 RX ADMIN — MIDODRINE HYDROCHLORIDE 10 MG: 5 TABLET ORAL at 18:13

## 2022-12-20 RX ADMIN — CHLORHEXIDINE GLUCONATE 0.12% ORAL RINSE 15 ML: 1.2 LIQUID ORAL at 20:16

## 2022-12-20 RX ADMIN — IPRATROPIUM BROMIDE AND ALBUTEROL SULFATE 3 ML: .5; 3 SOLUTION RESPIRATORY (INHALATION) at 07:26

## 2022-12-20 RX ADMIN — OXYCODONE HYDROCHLORIDE 5 MG: 5 TABLET ORAL at 10:34

## 2022-12-20 RX ADMIN — ACETYLCYSTEINE 2 ML: 200 SOLUTION ORAL; RESPIRATORY (INHALATION) at 07:26

## 2022-12-20 RX ADMIN — ACYCLOVIR: 50 OINTMENT TOPICAL at 22:26

## 2022-12-20 RX ADMIN — ACETYLCYSTEINE 2 ML: 200 SOLUTION ORAL; RESPIRATORY (INHALATION) at 12:47

## 2022-12-20 RX ADMIN — TACROLIMUS 1 MG: 5 CAPSULE ORAL at 20:43

## 2022-12-20 RX ADMIN — SODIUM CHLORIDE 8 MG/HR: 9 INJECTION, SOLUTION INTRAVENOUS at 13:29

## 2022-12-20 RX ADMIN — MIDODRINE HYDROCHLORIDE 10 MG: 5 TABLET ORAL at 12:28

## 2022-12-20 RX ADMIN — SODIUM CHLORIDE 97 ML: 900 INJECTION INTRAVENOUS at 08:14

## 2022-12-20 RX ADMIN — RIFAXIMIN 550 MG: 550 TABLET ORAL at 08:57

## 2022-12-20 RX ADMIN — IOPAMIDOL 74 ML: 755 INJECTION, SOLUTION INTRAVENOUS at 08:14

## 2022-12-20 RX ADMIN — Medication 1 DROP: at 08:57

## 2022-12-20 RX ADMIN — LEVETIRACETAM 2000 MG: 100 INJECTION, SOLUTION INTRAVENOUS at 06:41

## 2022-12-20 RX ADMIN — LORAZEPAM 2 MG: 2 INJECTION INTRAMUSCULAR at 05:55

## 2022-12-20 RX ADMIN — NYSTATIN 500000 UNITS: 100000 SUSPENSION ORAL at 18:13

## 2022-12-20 RX ADMIN — HYDROCORTISONE SODIUM SUCCINATE 25 MG: 100 INJECTION, POWDER, FOR SOLUTION INTRAMUSCULAR; INTRAVENOUS at 08:57

## 2022-12-20 RX ADMIN — GADOBUTROL 9 ML: 604.72 INJECTION INTRAVENOUS at 14:58

## 2022-12-20 RX ADMIN — HUMAN ALBUMIN MICROSPHERES AND PERFLUTREN 9 ML: 10; .22 INJECTION, SOLUTION INTRAVENOUS at 11:57

## 2022-12-20 RX ADMIN — OXYCODONE HYDROCHLORIDE 2.5 MG: 5 TABLET ORAL at 20:43

## 2022-12-20 RX ADMIN — PROPOFOL 5 MCG/KG/MIN: 10 INJECTION, EMULSION INTRAVENOUS at 14:02

## 2022-12-20 RX ADMIN — HYDROCORTISONE SODIUM SUCCINATE 25 MG: 100 INJECTION, POWDER, FOR SOLUTION INTRAMUSCULAR; INTRAVENOUS at 15:52

## 2022-12-20 RX ADMIN — TACROLIMUS 1 MG: 5 CAPSULE ORAL at 09:13

## 2022-12-20 RX ADMIN — IPRATROPIUM BROMIDE AND ALBUTEROL SULFATE 3 ML: .5; 3 SOLUTION RESPIRATORY (INHALATION) at 19:54

## 2022-12-20 RX ADMIN — RIFAXIMIN 550 MG: 550 TABLET ORAL at 20:15

## 2022-12-20 RX ADMIN — HYDROCORTISONE SODIUM SUCCINATE 25 MG: 100 INJECTION, POWDER, FOR SOLUTION INTRAMUSCULAR; INTRAVENOUS at 02:12

## 2022-12-20 RX ADMIN — ALBUMIN HUMAN 12.5 G: 0.25 SOLUTION INTRAVENOUS at 09:14

## 2022-12-20 RX ADMIN — CHLORHEXIDINE GLUCONATE 0.12% ORAL RINSE 15 ML: 1.2 LIQUID ORAL at 08:57

## 2022-12-20 RX ADMIN — Medication 1 DROP: at 15:54

## 2022-12-20 RX ADMIN — Medication 1 DROP: at 22:27

## 2022-12-20 RX ADMIN — IPRATROPIUM BROMIDE AND ALBUTEROL SULFATE 3 ML: .5; 3 SOLUTION RESPIRATORY (INHALATION) at 12:47

## 2022-12-20 RX ADMIN — FOLIC ACID 1 MG: 5 INJECTION, SOLUTION INTRAMUSCULAR; INTRAVENOUS; SUBCUTANEOUS at 09:13

## 2022-12-20 RX ADMIN — LORAZEPAM 2 MG: 2 INJECTION INTRAMUSCULAR; INTRAVENOUS at 05:55

## 2022-12-20 RX ADMIN — HYDROCORTISONE SODIUM SUCCINATE 25 MG: 100 INJECTION, POWDER, FOR SOLUTION INTRAMUSCULAR; INTRAVENOUS at 20:16

## 2022-12-20 RX ADMIN — MULTIVITAMIN 15 ML: LIQUID ORAL at 08:57

## 2022-12-20 RX ADMIN — OXYCODONE HYDROCHLORIDE 5 MG: 5 TABLET ORAL at 03:08

## 2022-12-20 ASSESSMENT — ACTIVITIES OF DAILY LIVING (ADL)
ADLS_ACUITY_SCORE: 51
ADLS_ACUITY_SCORE: 55
ADLS_ACUITY_SCORE: 51

## 2022-12-20 NOTE — PROGRESS NOTES
Respiratory care note:    RT called at approximately 0545 for patient oxygen desaturation and vent alarming for Low Tidal Volume. Nursing staff had already began bagging the pt when RT arrived. There was significant resistance during bagging attempts. Patient was quickly tracheally suctioned via open-suction catheter after bagging attempts. Large mucus plug was pulled from trach after open-suctioning. Following this, patient opened eyes and regained spontaneous respiratory efforts. Patient was suctioned 2-3 more times before being placed back on mechanical ventilator, with little to no return of secretions.     Patient titrated down to original vent settings before RT left the room.     CMV r16, vt450, peep5, FiO2 30%. SpO2 was at a consistent % at FiO2 of 30% before RT left room.    Guido Mclain, RT  12/20/22  7:02 AM

## 2022-12-20 NOTE — CONSULTS
[facility]    Stroke Telephone Note    I was called by Best Hernandez on 12/20/22 regarding patient Blanco Osborne. The patient is a 57 year old male with known medical history of liver failure s/p transplant in 2016 on Tacrolimus, A trial fibrillation, CHRISTIAN on BiPAP. Transferred to [facility] on 12/14 for hemothorax and acute anemia requiring RBC transfusion. He takes home doses of Eliquis as secondary prevention for ischemic stroke in the setting of A fin which was being held after finding sof hemothorax. He is currently being managed for acute hypoxic respiratory failure of unclear etiology. Developed PEA arrest earlier in the day. He wa sthen witnessed to have experienced GTC seizure activity now that lasted for 5 minutes and responded to ativan.    Imaging Findings   Still pending    Impression  First time episode of GTC seizures, in the setting of recent PEA arrest      Episode described as GTC seizure, lasting for  5 minutes, responding to ativan and then returning to baseline. Cerebil is being placed now and Keppra being ordered. Has evidence of electrolyte derangements of hyponatremia and hypocalcemia which need to be corrected. keppra needs to be renally adjuste din the setting of renal failure.    Recommendations   -Please STAT load with Keppra 20 mg per Kg then do maintenance dose of 750 mg BID, please adjust dose in the setting of renal failure, or can potentially switch to Lacosamide after obtaining EKG  -Please correct electrolytes hyponatremia and hypocalcemia  -Obtain head CT STAT  -Order EEG long term monitoring ordered  -Obtain brain MRI WWOUT contrast  -Day team stroke team will follow up shortly      My recommendations are based on the information provided over the phone by Blanco Osborne's in-person providers. They are not intended to replace the clinical judgment of his in-person providers. I was not requested to personally see or examine the patient at this  "time.    The Stroke Staff is Dr. Shetty.    Makeda Patel MD  Vascular Neurology Fellow  To page me or covering stroke neurology team member, click here: AMCOM   Choose \"On Call\" tab at top, then search dropdown box for \"Neurology Adult\", select location, press Enter, then look for stroke/neuro ICU/telestroke.      "

## 2022-12-20 NOTE — PROCEDURES
[facility]    Procedure: Flexible Bronchoscopy    Date/Time: 12/20/2022 2:19 PM  Performed by: Renato Madison MD  Authorized by: Renato Madison MD       UNIVERSAL PROTOCOL   Site Marked: NA  Prior Images Obtained and Reviewed:  Yes  Required items: Required blood products, implants, devices and special equipment available    Patient identity confirmed:  Arm band  Patient was reevaluated immediately before administering moderate or deep sedation or anesthesia  Confirmation Checklist:  Correct equipment/implants were available and procedure was appropriate and matched the consent or emergent situation  Time out: Immediately prior to the procedure a time out was called    Universal Protocol: the Joint Commission Universal Protocol was followed       ANESTHESIA    Local Anesthetic: Topical anesthetic      SEDATION  Patient Sedated: Yes    Sedation:  Propofol  Vital signs: Vital signs monitored during sedation      PROCEDURE  Describe Procedure: Bilateral tracheobronchial trees were inspected closely to the level of the subsegmental bronchi. Secretions were found to be minimal/none. Small mucus at tip of ETT. The procedure was completed and the patient tolerated the procedure well and without complications. Dr. Valenzuela was available for assistance throughout the entire procedure.  Patient Tolerance:  Patient tolerated the procedure well with no immediate complications  Length of time physician/provider present for 1:1 monitoring during sedation: 0    Renato Madison MD  Surgical Critical Care Fellow

## 2022-12-20 NOTE — PROGRESS NOTES
THORACIC SURGERY PROGRESS NOTE    Patient had resp failure and PEA arrest this morning, followed by grand mal seizure. Underwent CT chest PE study, negative for PE. Now on vent.    Right chest tube removed yesterday without complication by IR. Left chest tube clamped from 0200 to 0600 today.     Reviewed CT scan from this morning compared to 12/17. Right pleural effusion stable. Do not recommend further drainage at this time. Left pneumothorax essentially resolved.     Recommend repeat clamp trial of left chest tube from 0000 tonight to 0600 tomorrow morning, then repeat pCXR and unclamp chest tube.    Gunjan Gonzales PA-C with Dr. Nilesh Jackson  [facility]  Office: [phone]  Cell: [phone]

## 2022-12-20 NOTE — PROGRESS NOTES
Renal Medicine Progress Note            Assessment/Plan:     Assessment: Blanco Osborne is a 56 yo male with PMH CARRILLO cirrhosis s/p liver tx (2016), afib, HTN, CHRISTIAN, CVA and recent prolonged admission after PEA arrest s/p trach/PEG re-admitted 12/16/2022 with hematochezia and L hemothorax s/p CT.     Anuric LORETTA requiring dialysis    Due to PEA arrest. CRRT discontinued 11/26, IHD started 11/29, remains on MWF schedule.   - HD tomorrow, no acute needs today    Anemia of renal disease  Hematochezia, resolved    - epo 10K with dialysis   - GI consulted for poss GIB    Chronic hypotension  Possibly due to cirrhosis   - midodrine 10 mg TID     Acute on chronic hypoxic respiratory failure   S/p tracheostomy 11/29/22  Hemopneumothorax s/p L CT   H/o aspergillus PNA and multiple bacterial PNAs during last admission. Recent hemopneumothorax s/p L CT necessitating re-admission. Mucus plugging event 12/20 resulting in brief PEA arrest and severe hypoxia.     CARRILLO cirrhosis s/p liver tx  Immunosuppression per GI.          Interval History:     - early this AM, had hypoxic event requiring bagging and leading to brief PEA arrest. Thought to be due to mucous plugging. This event was followed by a grand-mal seizure. EEG pending   - soft Bps despite midodrine   - no UOP   - HD yesterday uneventful with 1.3L UF   - HD planned for tomorrow   - patient wouldn't wake up or follow commands during my visit          Medications and Allergies:       - MEDICATION INSTRUCTIONS for Dialysis Patients -   Does not apply See Admin Instructions     acetylcysteine  2 mL Nebulization 4x daily     carboxymethylcellulose PF  1 drop Both Eyes TID     chlorhexidine  15 mL Mouth/Throat Q12H     epoetin mirta-epbx  10,000 Units Subcutaneous Once per day on Mon Wed Fri     folic acid  1 mg Intravenous Daily     hydrocortisone sodium succinate PF  25 mg Intravenous Q6H     insulin aspart  1-6 Units Subcutaneous Q4H     [Auto Hold] insulin glargine  30  Units Subcutaneous QAM AC     ipratropium - albuterol 0.5 mg/2.5 mg/3 mL  3 mL Nebulization 4x daily     lactulose  20 g Oral or Feeding Tube TID     [START ON 12/21/2022] levETIRAcetam  1,000 mg Intravenous Q24H     midodrine  10 mg Oral or Feeding Tube TID w/meals     multivitamins w/minerals  15 mL Per Feeding Tube Daily     nystatin  500,000 Units Swish & Swallow 4x Daily     rifaximin  550 mg Per Feeding Tube BID     sodium chloride (PF)  10-40 mL Intracatheter Q8H     tacrolimus  1 mg Oral or Feeding Tube BID      No Known Allergies         Physical Exam:   Vitals were reviewed  BP 93/62   Pulse 64   Temp 97.5  F (36.4  C) (Axillary)   Resp 11   Wt 93.8 kg (206 lb 12.7 oz)   SpO2 100%   BMI 28.05 kg/m      Wt Readings from Last 3 Encounters:   12/20/22 93.8 kg (206 lb 12.7 oz)   12/16/22 100.2 kg (221 lb)   12/16/22 100.2 kg (221 lb)       Intake/Output Summary (Last 24 hours) at 12/20/2022 1146  Last data filed at 12/20/2022 1000  Gross per 24 hour   Intake 2360 ml   Output 1306 ml   Net 1054 ml       GENERAL APPEARANCE: trached, not responding to voice  HEENT:  Dry MM   RESP: poor air movement bilaterally   CV: RRR  ABDOMEN: soft, non-tender, non-distended  EXTREMITIES/SKIN: no LE edema  NEURO:  Would not awaken to voice. Grimaced in discomfort.  LINES: L CT, RIJ TDC clean and intact            Data:     BMP  Recent Labs   Lab 12/20/22  0757 12/20/22  0432 12/20/22  0426 12/20/22  0053 12/19/22  0940 12/19/22  0624 12/17/22  0253 12/16/22  2239 12/16/22 2027 12/16/22  1620   NA  --   --  131*  --   --  131*  --  135  --  136   POTASSIUM  --   --  3.8  --   --  4.4  --  4.0  --  4.2   CHLORIDE  --   --  96  --   --  94  --  99  --  98   KELLY  --   --  7.6*  --   --  7.8*  --  8.0*  --  8.3*   CO2  --   --  28  --   --  26  --  26  --  26   BUN  --   --  50*  --   --  75*  --  114*  --  108*   CR  --   --  2.22*  --   --  3.06*  --  3.35*  --  3.59*   * 115* 129* 132*   < > 133*   < > 104*   < >  179*    < > = values in this interval not displayed.     CBC  Recent Labs   Lab 12/20/22  0426 12/19/22  0624 12/19/22  0039 12/18/22  1816 12/17/22  0519 12/16/22  2239 12/16/22  1620   WBC 4.3 4.1  --   --   --  3.9* 4.5   HGB 8.2* 8.2* 8.2* 7.4*   < > 7.9*  7.9* 7.0*   HCT 26.5* 26.0*  --   --   --  25.7* 23.3*   * 102*  --   --   --  106* 107*   PLT 84* 84*  --   --   --  94* 111*    < > = values in this interval not displayed.     Lab Results   Component Value Date    AST 20 12/20/2022    ALT 19 12/20/2022    ALKPHOS 147 12/20/2022    BILITOTAL 1.8 (H) 12/20/2022    CHANDLER 38 12/18/2022     Lab Results   Component Value Date    INR 1.14 12/16/2022       Attestation:  I have reviewed today's vital signs, notes, medications, labs and imaging.    Dwayne Laboy MD  [facility] - Nephrology  Office: [phone]

## 2022-12-20 NOTE — PROGRESS NOTES
Intensivist note  Patient had an episode where he became difficult to ventilate, hypoxemic, bradycardic and brief PEA arrest. He had a brief episode of CPR (no meds given) and quickly returned with rhythm and adequate oxygenation along with aggressive bagging and pulmonary hygiene. Follow up CXR afterwards was unchanged though with slight increased haziness on right.     His AM labs (sent immediately before event) were reviewed and appeared not significantly different from yesterday.     Lungs were clear, abdomen soft and non-tender and possibly distended; extremities were warm with mild edema; skin showed no cyanosis or mottling     About 20 minutes after event he had a grand mal seizure witnessed by myself and staff. He received ativan 2 mgms IV with resolution of seizing. Keppra 2 grams IV ordered and head CT (non-contrast) was ordered. Case discussed with neuro-CCM who will order EEG and will see today.    His BP's are now on low side, with 500 mls crystalloid infusing and levophed on backup if needed.     I suspect respiratory event due to mucous plugging which he has had before and now seems resolved; he has pulmonary hygiene measures ordered and sputum gram stain pending; this seems to be improving with aggressive respiratory care. I am concerned seizure may be a hypoxic event and/or metabolic disturbance.     40 minutes of critical care time spent with him today.     Discussed with wife via phone to update her on above    germain fitch  December 20, 2022

## 2022-12-20 NOTE — CONSULTS
[facility]    Stroke Consult Note    Reason for Consult:  New onset GTC in setting of critical illness    Chief Complaint: No chief complaint on file.       HPI  Blanco Osborne is a 56 YO M w/vascular RFs: afib with AC currently held in setting of hemothorax with chest tubes, CHRISTIAN on BiPAP and PMHx of liver failure 2/2 EtOH use s/p hepatic transplant on tacrolimus, s/p trach/PEG following recent hospitalization at OSH for AHRF 2/2 strep PNA c/b PEA and septic shock with multiorgan failure/injury.    He was readmitted to OSH for acute GIB and L hemothorax requiring chest tube at OSH. After blood transfusion he was transferred to [facility] on 12/16.     On 12/19 had brief PEA arrest (difficult to ventilate, hypoxic, bradycardic), felt 2/2 to primary pulmonary (mucous plugging). Following PEA (about 20 min) he had witnessed GTC lasting about 5 min and resolving with 2mg IV lorazepam. Keppra 2 gm load subsequently given.     Stroke Evaluation Summarized    MRI/Head CT NCCT  No acute intracranial abnormality.   Intracranial Vasculature NA   Cervical Vasculature NA     Echocardiogram Pending   EKG/Telemetry Sinus rhythm   Nonspecific T wave abnormality    Other Testing Not Applicable      LDL  No lab value available in past 30 days   A1C  11/19/2022: 4.7 %   Troponin No lab value available in past 48 hrs       Impression/Recommendations  # New GTC in setting of critical illness with multiorgan failure/dysfunction/injury, PEA 2/2 acute pulmonary event. Pt is critically ill with renal failure requiring dialysis, pulmonary failure requiring MV, and likely exacerbated by new PEA event on 12/20 leading to cortical irritability. On exam he follows commands, moving all extremities although moves LUE much less vs RUE.   - cEEG  - Cont keppra 1 gm daily (renally dosed, dialysis dosed) - appreciate Pharmacy help  - MRI brain w/wo to r/o small stroke as etiology (focal LUE weakness) or other vasculopathy  "(PRES - tacrolimus is RF)  - Stroke Neurology will cont to follow    Patient Follow-up    - final recommendation pending work-up    Thank you for this consult. We will continue to follow.     The Stroke/Neurocritical Care Staff is Dr. Shetty.    Maurice Benjamin MD  Vascular Neurology Fellow  To page me or covering stroke neurology team member, click here: AMCOM   Choose \"On Call\" tab at top, then search dropdown box for \"Neurology Adult\", select location, press Enter, then look for stroke/neuro ICU/telestroke.  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     Cirrhosis of liver with ascites (H) 2/11/2016     H/O alcohol abuse      HTN (hypertension)      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     NAFLD (nonalcoholic fatty liver disease) 2/11/2016     CHRISTIAN on CPAP      Parainfluenza type 1 infection 11/19/2022     SBP (spontaneous bacterial peritonitis) (H)     [facility]     Sepsis due to Streptococcus pneumoniae with acute hypoxic respiratory failure (H) 11/19/2022     Tubular adenoma 10/2019    Large cecal adenoma- due for surveillance colonoscopy in 3 years (10/2022)     Past Surgical History   Past Surgical History:   Procedure Laterality Date     APPENDECTOMY       BENCH LIVER N/A 9/20/2016    Procedure: BENCH LIVER;  Surgeon: Enoc Crews MD;  Location: [facility] OR     COLONOSCOPY  8/6/13    repeat in 2018     COLONOSCOPY N/A " 10/4/2019    Procedure: COLONOSCOPY, WITH POLYPECTOMY AND BIOPSY;  Surgeon: Go Chong MD;  Location: [facility] OR     HERNIA REPAIR       IR CHEST TUBE PLACEMENT NON-TUNNELED RIGHT  2022     IR CVC TUNNEL PLACEMENT > 5 YRS OF AGE  2022     IR GASTROSTOMY TUBE PERCUTANEOUS PLCMNT  2022     IR PARACENTESIS  2022     IR PARACENTESIS  2022     IR THORACENTESIS  2022     IR TRANSCATHETER BIOPSY  2022     TRACHEOSTOMY N/A 2022    Procedure: TRACHEOSTOMY;  Surgeon: Nilesh Jackson MD;  Location:  OR     TRANSPLANT LIVER RECIPIENT  DONOR N/A 2016    Procedure: TRANSPLANT LIVER RECIPIENT  DONOR;  Surgeon: Enoc Crews MD;  Location: [facility] OR     Medications   Home Meds  Prior to Admission medications    Medication Sig Start Date End Date Taking? Authorizing Provider   acetylcysteine (MUCOMYST) 20 % neb solution Take 2 mLs by nebulization 2 times daily 12/15/22  Yes Rainer Jones MD   albuterol (PROVENTIL) (2.5 MG/3ML) 0.083% neb solution Take 1 vial (2.5 mg) by nebulization every 2 hours as needed for shortness of breath 12/15/22  Yes Rainer Jones MD   apixaban ANTICOAGULANT (ELIQUIS) 5 MG tablet 1 tablet (5 mg) by Oral or Feeding Tube route 2 times daily 12/15/22  Yes Rainer Jones MD   calcium carbonate (TUMS) 500 MG chewable tablet Take 1-2 chew tab by mouth 4 times daily as needed for heartburn   Yes Unknown, Entered By History   carboxymethylcellulose PF (REFRESH PLUS) 0.5 % ophthalmic solution Place 1 drop into both eyes 3 times daily 12/15/22  Yes Rainer Jones MD   famotidine (PEPCID) 20 MG tablet Take 20 mg by mouth 2 times daily as needed (heartburn)   Yes Unknown, Entered By History   folic acid 5 MG/ML injection Inject 0.2 mLs (1 mg) into the vein daily 22  Yes Rainer Jones MD   glucose 40 % (400 mg/mL) gel Take 15-30 g by mouth every 15 minutes as needed  for low blood sugar 12/15/22  Yes Rainer Jones MD   hydrocortisone sodium succinate PF (SOLU-CORTEF) 100 MG injection Inject 0.5 mLs (25 mg) into the vein every 6 hours 12/15/22  Yes Rainer Jones MD   insulin glargine (LANTUS PEN) 100 UNIT/ML pen Inject 30 Units Subcutaneous every morning (before breakfast) 12/16/22  Yes Rainer Jones MD   ipratropium - albuterol 0.5 mg/2.5 mg/3 mL (DUONEB) 0.5-2.5 (3) MG/3ML neb solution Take 1 vial (3 mLs) by nebulization 4 times daily 12/15/22  Yes Rainer Jones MD   lactulose (CHRONULAC) 10 GM/15ML solution 30 mLs (20 g) by Oral or Feeding Tube route 3 times daily 12/15/22  Yes Rainer Jones MD   midodrine (PROAMATINE) 10 MG tablet 1 tablet (10 mg) by Oral or Feeding Tube route 3 times daily (with meals) 12/15/22  Yes Rainer Jones MD   Multiple Vitamins-Minerals (MULTIVITAMINS W/MINERALS) liquid 15 mLs by Per Feeding Tube route daily 12/16/22  Yes Rainer Jones MD   nystatin (MYCOSTATIN) 922698 UNIT/ML suspension Swish and swallow 5 mLs (500,000 Units) in mouth 4 times daily 12/15/22  Yes Rainer Jones MD   ondansetron (ZOFRAN ODT) 4 MG ODT tab Take 1 tablet (4 mg) by mouth every 6 hours as needed for nausea or vomiting 12/15/22  Yes Rainer Jones MD   oxyCODONE (ROXICODONE) 5 MG tablet 1 tablet (5 mg) by Per Feeding Tube route every 4 hours as needed for moderate pain (4-6) 12/15/22  Yes Rainer Jones MD   pantoprazole (PROTONIX) 2 mg/mL SUSP suspension 20 mLs (40 mg) by Per Feeding Tube route every morning (before breakfast) 12/16/22  Yes Rainer Jones MD   rifaximin (XIFAXAN) 550 MG TABS tablet 1 tablet (550 mg) by Per Feeding Tube route 2 times daily 12/15/22  Yes Rainer Jones MD   tacrolimus (GENERIC EQUIVALENT) 1 mg/mL suspension 1 mL (1 mg) by Oral or Feeding Tube route 2 times daily 12/15/22  Yes Rainer Jones  MD Aguilar   order for DME Equipment being ordered: Compression knee high stockings for B LE lymphedema 20-30mmHg  Patient not taking: Reported on 2/20/2019 1/27/17   Juan Simms MD   order for DME Equipment being ordered: Class 2 (30-40mmHg) compression knee high stockings, night time knee high compression alternative, bandaging supplies  Patient not taking: Reported on 2/20/2019 6/20/16   Juan Simms MD       Scheduled Meds    - MEDICATION INSTRUCTIONS for Dialysis Patients -   Does not apply See Admin Instructions     acetylcysteine  2 mL Nebulization 4x daily     carboxymethylcellulose PF  1 drop Both Eyes TID     chlorhexidine  15 mL Mouth/Throat Q12H     epoetin mirta-epbx  10,000 Units Subcutaneous Once per day on Mon Wed Fri     folic acid  1 mg Intravenous Daily     hydrocortisone sodium succinate PF  25 mg Intravenous Q6H     insulin aspart  1-6 Units Subcutaneous Q4H     [Auto Hold] insulin glargine  30 Units Subcutaneous QAM AC     ipratropium - albuterol 0.5 mg/2.5 mg/3 mL  3 mL Nebulization 4x daily     lactulose  20 g Oral or Feeding Tube TID     [START ON 12/21/2022] levETIRAcetam  1,000 mg Intravenous Q24H     midodrine  10 mg Oral or Feeding Tube TID w/meals     multivitamins w/minerals  15 mL Per Feeding Tube Daily     nystatin  500,000 Units Swish & Swallow 4x Daily     rifaximin  550 mg Per Feeding Tube BID     sodium chloride (PF)  10-40 mL Intracatheter Q8H     tacrolimus  1 mg Oral or Feeding Tube BID       Infusion Meds    D5W 50 mL/hr at 12/19/22 2117     norepinephrine Stopped (12/20/22 1029)     pantoprazole (PROTONIX) infusion ADULT/PEDS GREATER than or EQUAL to 45 kg 8 mg/hr (12/20/22 0136)     propofol         PRN Meds  albuterol, glucose **OR** dextrose **OR** glucagon, LORazepam, naloxone **OR** naloxone **OR** naloxone **OR** naloxone, ondansetron, oxyCODONE    Allergies   No Known Allergies  Family History   No family history on file.  Social History   Social History      Tobacco Use     Smoking status: Never     Smokeless tobacco: Never   Substance Use Topics     Alcohol use: Not Currently     Alcohol/week: 0.0 standard drinks     Drug use: No       Review of Systems   The 10 point Review of Systems is negative other than noted in the HPI or here.        PHYSICAL EXAMINATION   Temp:  [96.9  F (36.1  C)-97.7  F (36.5  C)] 97.5  F (36.4  C)  Pulse:  [64-97] 64  Resp:  [0-36] 11  BP: ()/(54-93) 93/62  FiO2 (%):  [25 %-30 %] 30 %  SpO2:  [95 %-100 %] 100 %    Neuro Exam  MS: E2, trached, M6, follows some commands (opens mouth, squeezes hands), RUE with purposeful movements (reaches over to scratch head)  CN: Pupils 3 mm and reactive bilat, does not BTT, grimace to pain is symmetric  Motor: Spont moves RUE purposefully, much less LUE movement and does not withdraw to nox stim in LUE, decreased BLE movements to nox stim (mainly moves toes)  Sensory: Senses pain in all 4 limbs    Dysphagia Screen  Per Nursing      Imaging  I personally reviewed all imaging; relevant findings per HPI.    Labs Data   CBC  Recent Labs   Lab 12/20/22  0426 12/19/22  0624 12/19/22  0039 12/17/22  0519 12/16/22 2239   WBC 4.3 4.1  --   --  3.9*   RBC 2.60* 2.55*  --   --  2.43*   HGB 8.2* 8.2* 8.2*   < > 7.9*  7.9*   HCT 26.5* 26.0*  --   --  25.7*   PLT 84* 84*  --   --  94*    < > = values in this interval not displayed.     Basic Metabolic Panel   Recent Labs   Lab 12/20/22  0757 12/20/22  0432 12/20/22  0426 12/19/22  0940 12/19/22  0624 12/17/22  0253 12/16/22 2239   NA  --   --  131*  --  131*  --  135   POTASSIUM  --   --  3.8  --  4.4  --  4.0   CHLORIDE  --   --  96  --  94  --  99   CO2  --   --  28  --  26  --  26   BUN  --   --  50*  --  75*  --  114*   CR  --   --  2.22*  --  3.06*  --  3.35*   * 115* 129*   < > 133*   < > 104*   KELLY  --   --  7.6*  --  7.8*  --  8.0*    < > = values in this interval not displayed.     Liver Panel  Recent Labs   Lab 12/20/22  0426 12/19/22  0624  12/16/22 2239 12/16/22  0626   PROTTOTAL 5.0*  --  5.1* 4.8*   ALBUMIN 2.0* 2.1*  1.9* 1.7* 2.2*   BILITOTAL 1.8*  --  0.9 0.6   ALKPHOS 147  --  251* 253*   AST 20  --  21 22   ALT 19  --  20 16     INR    Recent Labs   Lab Test 12/16/22 2239 12/16/22  1620 12/06/22  0513   INR 1.14 1.58* 1.16*      Lipid Profile    Recent Labs   Lab Test 11/20/22  1151 04/20/20  1157 08/01/19  1500 01/08/19  0840 10/31/17  1037   CHOL 121 161  --   --  134   HDL 22* 42  --   --  57   LDL 80 81  --   --  58   TRIG 94 192* 177*   < > 92    < > = values in this interval not displayed.     A1C    Recent Labs   Lab Test 11/19/22  0212 09/22/16  0741   A1C 4.7 4.8     Troponin  No results for input(s): CTROPT, TROPONINIS, TROPONINI, GHTROP in the last 168 hours.

## 2022-12-20 NOTE — PROGRESS NOTES
[facility] Intensive Care Unit  History and Physical  December 20 , 2022      Blanco Osborne MRN# [id number]   Age: 57 year old YOB: 1965     Date of Admission: 12/16/2022     Problem List:  1. Acute respiratory failure/PEA arrest X 2   A) presumed mucus plug instigating above 12/20/22    B)  L PTX   C)  R PTX-resolved  2. S/P Liver tx 2016  3. Acute renal failure      Overnight events:  Patient with desaturation/?PEA arrest ~ 0500 12/20/22. Received ~ 2 min CPR with ROSC-no meds given.         Treatment goals for next 24 hours:   1. CT head/CT PE chest-done  2. EEG  3. TTECHO  4. Bronchoscopy for evaluation of mucus plugs/secretions  5. Possible paracentesis later today        Alena Valenzuela MD  #[id number]  Critical Care Total Time 45 minutes-exclusive of procedures.             Assessment and Plan:     57 year old male with paroxysmal A. fib, Liver transplant (2016), CHRISTIAN on BiPAP, h/o CVA and hypertension. Patient  s/p Trach/PEG following acu had Rt sided Chest tube placed for hydroptx on 12/12/22 and  transferred to [place] on 12/14/22 for CIP.   Pt  noted to have bloody stool on 12/15 and 12/16 and had left Ptx which required left sided chest tube leading to ~900ml of bloody output. Pa  He was given 2uPRBC's and transferred to [facility].    Neurology and Psychiatry:  1. PEA arrest/? Mucus plug- not awake-CT head without acute process  2. H/o Embolic CVA: Elliquis is held secondary to presumed GI bleed  Plan:  1. Neuro ICU evaluating  2. EEG  3. Likely MRI brain      Pulmonary:  PEA/respiratory arrest earlier today-per report mucus plug  S/P Trach (11/29) on vent-failed extubation X 2  - Still requiring significant amount of PS   2. H/o CHRISTIAN was on BiPAP  3. New Left Ptx with acute on hypoxic respiratory failure and ? hemithorax: Etiology unclear. Pigtail placed on 12/16/2022 and significant decrease in volume of left pneumothorax  4. Hx of right hydropneumothorax: No output nor air leak. -  -  Removed 12/19/22 per CT surgery  Plan:  1. Continue on full vent support  2. CT chest with PE protocol  3. Continue L chest tube to suction      Cardiovascular system:  As above-s/p PEA arrest earlier today  - CPR  ~ 2 min no medications administered  - Remains hypotensive-given albumin  1. H/o HTN  2. Afib: On Eliquis but on hold due prior thought of GI bleed  Plan:  1. ECHO  2. Place arterial line  3. Further 25% albumin  4. Vasopressors as needed-maintain MAP >70 mm Hg- hold midodrine     Renal/Electrolytes:  1. LORETTA: was on CRRT but now on iHD.   - MWF schedule- had HD session 12/19/22- tolerated UF  Plan:  1. May need CRRT if     ID:  1. H/o Strep/Parainfluenza infection: Completed treatment course  2. H/o Aspergillus: Was on Vori for <15days.  3. 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  Plan:  1. Maintain off treatment antimicrobials for now  2. Repeat blood cultures      GI/:  1. GI bleed: No clear evidence ongoing Apparently had bloody stool overnight on 12/17. GI team suspicious of ? iatrogenic rectal ulcer- no   2. Ascites: S/p Tap on 12/1 - no evidence of infection/Malignancy  - Of note-patient with markedly enlarged spleen prior to transplant that has not regressed post liver tx in 2016.  3. LIver tx: Cont Tac goal level of 5 -7 and solucortef.   CT abdomen/pelvis 12/19/22-mod-large ascites/enlarged spleen as above. No other obvious issues.  Now with diarrhea-lactulose restarted  Plan:  1. Paracentesis anticipated later today.  2. Recheck NH3    Endocrine:  1. DM:  Plan:  - Was on lantus 30u daily. Will hold or cut down by 50%.  - SSI    Nutrition:  Resume TF. Will evaluate and recommence when bleed subsides    Heme/Onc:  1.  Pancytopenia;  Lymphopenic on diff  - Chronic splenomegaly-present prior to liver tx but has not regressed. Prior CT chest with evidence of subclavicular LN  ? Indolent blood dyscrasia  Plan:  1. Peripheral smear  2. Review recent scans for adenopathy.    ICU prophylaxis:       DVT; SCDs        VAP; As above     Restrain needed: No     Stress ulcer: IV PPI     Central line: Needed today for IV access/Meds.    Code Status: Full    Prognosis: Critical    Family update: Done at bedside by with patient's wife in attendance.                 Mechanical Ventilation:     Vent Mode: CMV/AC  (Continuous Mandatory Ventilation/ Assist Control)  FiO2 (%): 30 %  Resp Rate (Set): 16 breaths/min  Tidal Volume (Set, mL): 450 mL  PEEP (cm H2O): 5 cmH2O  Pressure Support (cm H2O): 10 cmH2O  Resp: 11           Medications:     Current Facility-Administered Medications   Medication     - MEDICATION INSTRUCTIONS for Dialysis Patients -     acetylcysteine (MUCOMYST) 20 % nebulizer solution 2 mL     albuterol (PROVENTIL) neb solution 2.5 mg     epoetin mirta-epbx (RETACRIT) injection 10,000 Units     folic acid injection 1 mg     hydrocortisone sodium succinate PF (solu-CORTEF) injection 25 mg     insulin aspart (NovoLOG) injection (RAPID ACTING)     [Auto Hold] insulin glargine (LANTUS PEN) injection 30 Units     ipratropium - albuterol 0.5 mg/2.5 mg/3 mL (DUONEB) neb solution 3 mL     lactulose (CHRONULAC) solution 20 g     levETIRAcetam (KEPPRA) 750 mg in sodium chloride 0.9 % 100 mL intermittent infusion     LORazepam (ATIVAN) injection 2 mg     midodrine (PROAMATINE) tablet 10 mg     multivitamins w/minerals liquid 15 mL     norepinephrine (LEVOPHED) 4 mg in  mL infusion PREMIX     nystatin (MYCOSTATIN) suspension 500,000 Units     ondansetron (ZOFRAN ODT) ODT tab 4 mg     oxyCODONE (ROXICODONE) tablet 5 mg     pantoprazole (PROTONIX) 80 mg in sodium chloride 0.9 % 100 mL infusion     propofol (DIPRIVAN) infusion     rifaximin (XIFAXAN) tablet 550 mg     sodium chloride (PF) 0.9% PF flush 10-40 mL     tacrolimus (GENERIC EQUIVALENT) suspension 1 mg            General: Alert, oriented, not in distress. S/p Trach  Vitals: BP 93/62   Pulse 64   Temp 97.5  F (36.4  C) (Axillary)   Resp 11   Wt 93.8  kg (206 lb 12.7 oz)   SpO2 100%   BMI 28.05 kg/m     SpO2: 100 %    HEENT: neck supple, symmetrical, no lymph node enlargement, thyromegaly, bruit, JVD, pupils are isocoric and equally responsive to the light. Mallampati score, Grade III  Trach +. Healing lip lesions.  Lungs: both hemithoraces are symmetrical, normal to palpation, no dullness to percussion, auscultation of lungs revealed bilateral coarse crackles. Rubén chest tubes in place  CVS: Normal S1 and S2, no additional heart sounds, murmur, rub, normal peripheral pulses  Abdomen: Bowel sounds present, soft, Diffusely mildly tender, mildly distended, no organomegaly, ascitis, mass  Extremities/musculoskeletal: no peripheral edema, deformity, cyanosis, clubbing  Neurology: alert, orientedx3, no motor/sensorial deficits, cranial nerves grossly normal  Skin: no rash  Psychiatry: Mood and affect are appropriate       Exam of Line sites: No erythema or discharge.          Labs:     CBC RESULTS:      Recent Labs   LABS 12/20/22 12/19/22  0624 12/16/22  0626 12/15/22  0414 12/14/22  0412   WBCLab 4.3 4.1 3.4* 3.7* 2.8*   RBC  2.55* 2.05* 2.26* 2.11*   HGB 8.2 8.2* 6.6* 7.3* 7.0*   HCT 26.5 26.0* 22.3* 24.7* 23.1*   PLT 84 84* 83* 78* 58*     < > = values in this interval not displayed.       Basic Metabolic Panel:   Recent Labs   LABS 12/20/22 12/19/22  0624 12/16/22  2239 12/15/22  0414   Sodium 131 131* 135 135   Potassium 3.8 4.4 4.0 4.0   Chloride 96 94 99 99   CO2 28 26 26 30   BUN 50 75* 114* 72*   CR 2.22 3.06* 3.35* 2.39*   GLC  133* 104* 136*   KELLY 7.6 7.8* 8.0* 8.0*     < > = values in this interval not displayed.       INR  Recent Labs   Lab 12/16/22 2239 12/16/22  1620   INR 1.14 1.58*

## 2022-12-20 NOTE — OR NURSING
Bronchoscopy complete at the bedside. Consent obtained and sedation by bedside RN and no specimens taken.

## 2022-12-20 NOTE — PROGRESS NOTES
Patient follows commands. Wife at bedside off and on today. Dialysis this afternoon, plan for US guided paracentesis tomorrow. No BM today, got lactulose this evening. Patient states he voids occasionally, no urine the 8hrs I cared for him. Peg tube clamped. Tele SR. He is on protonix gtt. Continue to monitor.

## 2022-12-20 NOTE — PROCEDURES
[facility]    Arterial line placement    Date/Time: 12/20/2022 12:48 PM  Performed by: Renato Madison MD  Authorized by: Renato Madison MD       UNIVERSAL PROTOCOL   Site Marked: NA  Prior Images Obtained and Reviewed:  NA  Required items: Required blood products, implants, devices and special equipment available    Patient identity confirmed:  Arm band  Patient was reevaluated immediately before administering moderate or deep sedation or anesthesia  Confirmation Checklist:  Correct equipment/implants were available  Universal Protocol: the Joint Commission Universal Protocol was followed    Indication: hemodynamic monitoring  Location: right femoral      PROCEDURE DETAILS    Needle Gauge:  20  Seldinger technique: Seldinger technique used    Number of Attempts:  1  Post-procedure:  Line sutured and dressing applied      PROCEDURE  Describe Procedure: Attempted both radials with successful pulsatility but unable to thread wire.  Patient Tolerance:  Patient tolerated the procedure well with no immediate complications  Length of time physician/provider present for 1:1 monitoring during sedation: 0      Renato Mdaison MD  Surgical Critical Care Fellow

## 2022-12-20 NOTE — PROGRESS NOTES
Bronchoscopy done at bedside. No samples taken. Pt tolerated well.     RT will continue to follow.    Vangie Enciso RT assistant

## 2022-12-20 NOTE — PROGRESS NOTES
[facility] ICU RESPIRATORY NOTE        Date of Admission: 12/16/2022    Date of Intubation (most recent): Chronic Trach    Reason for Mechanical Ventilation: Resp Failure    Number of Days on Mechanical Ventilation: 5    Met Criteria for Spontaneous Breathing Trial: Not overnight, but pt to PS during day (10/5 30%)    Reason for No Spontaneous Breathing Trial: No PS overnight per MD    Significant Events Today: None    ABG Results:   Recent Labs   Lab 12/17/22  1136   O2PER 30       Current Vent Settings: Vent Mode: CMV/AC  (Continuous Mandatory Ventilation/ Assist Control)  FiO2 (%): 25 %  Resp Rate (Set): 16 breaths/min  Tidal Volume (Set, mL): 450 mL  PEEP (cm H2O): 5 cmH2O  Pressure Support (cm H2O): 10 cmH2O  Resp: 12      Skin Assessment: Peritracheal skin remains nonblanchably red, oozing secretions. No other issues.     Plan: Pt to remain on full vent support over night. Pt to undergo PS during day    Guido Mclain RT on 12/20/2022 at 4:21 AM

## 2022-12-20 NOTE — PROGRESS NOTES
[facility] Intensive Care Unit  History and Physical  December 19, 2022      Blanco Osborne MRN# [id number]   Age: 57 year old YOB: 1965     Date of Admission: 12/16/2022    Primary care provider: Ki Powers     Problem List:  1. Acute respiratory failure/PEA arrest   A) . L PTX   B)   R PTX-resolved  2. GIB  3. S/P Liver tx 2016  4. Acute renal faillure               Treatment goals for next 24 hours:   1. CT abdomen Pelvis  2. Paracentesis post above  3. Likely restart lactulose  4. Anticipate HD today      Alena Valenzuela MD  #[id number]  Critical Care Total Time 35 minutes-exclusive of procedures.             Assessment and Plan:     :  57 year old male with paroxysmal A. fib, Liver transplant (2016), CHRISTIAN on BiPAP, h/o CVA and hypertension. Patient  s/p Trach/PEG following acu had Rt sided Chest tube placed for hydroptx on 12/12/22 and  transferred to [place] on 12/14/22 for CIP.   Pt  noted to have bloody stool on 12/15 and 12/16 and had left Ptx which required left sided chest tube leading to ~900ml of bloody output. Pa  He was given 2uPRBC's and transferred to [facility].    Neurology and Psychiatry: Sedation and analgesia achieved by;  1. H/o Embolic CVA: Elliquis is currently held and will start heparin drip once GI bleed subsides  2. CIP: Continue PT/OT     Pulmonary: Lung protective ventilation strategy with a tidal volume of 6-8mL/kg of ideal body weight, head of the bed elevated at 30 degrees, and oral care.  1. H/o Strep/Parainfluenza pna leading to Acute resp failure: Failed extubation x2. S/p Trach on 11/29/22. Tolerated PS 12/18/22 trials  still requiring significant amount of pressure support. Will try PST during the day and full vent support at night.  2. H/o CHRISTIAN was on BiPAP, currently Trach'ed.  3. New Left Ptx with acute on hypoxic respiratory failure and ? hemithorax: Etiology unclear. Pigtail placed on 12/16/2022 and significant decrease in volume of left pneumothorax  4. Hx  of right hydropneumothorax: No output nor air leak. L CT placed per CT surgery.      Cardiovascular system:  1. H/o HTN  2. Afib: On Eliquis but on hold due to ? GI bl;eed    Renal/Electrolytes:  1. LORETTA: was on CRRT but now on iHD. He was on MWF schedule, had HD session yesterday. No acute need noted. Still Anuric.    Plan:  - Nephrology for iHD needs    ID:  1. H/o Strep/Parainfluenza infection: Completed treatment course  2. H/o Aspergillus: Was on Vori for <15days.  3. 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    Plan:  - No acute issues.  -  Pleural fluid cultures are negative so far    GI/:  1. GI bleed: Had a bloody stool overnight on 12/17. GI team suspicious of ? iatrogenic rectal ulcer  2. Ascites: S/p Tap on 12/1 - no evidence of infection/Malignancy  3. LIver tx: Cont Tac goal level of 5 -7 and solucortef. Ammonia> 100 but having loose stools, alert and no need to continue to check values     Plan:  - Appreciate GI rcommedations, no dire need for scope now  - Eliquis is being held and will plan to start heparin drip prior to transition to Elliquis after GI bleed subsides and pleural fluid is less bloody.  - cont rifaximin and hold lactulose due to very frequent loose stools  - cont midodrine    Endocrine:  1. DM:    Plan:  - Was on lantus 30u daily. Will hold or cut down by 50%.  - SSI    Nutrition:   - Hold TF due to possible GI bleed. Will evaluate and recommence when bleed subsides    Heme/Onc:  1.  Chronic Anemia: Likely due to chronic illness and GI bleed  2.  ChronicThrombocytopenia:    Plan:  - Will monitor counts closely and transfuse accordingly    ICU prophylaxis:      DVT; SCDs        VAP; As above     Restrain needed: No     Stress ulcer: IV PPI     Central line: Needed today for IV access/Meds.    Code Status: Full    Prognosis: Critical    Family update: Done at bedside by with patient's wife in attendance.                 Mechanical Ventilation:     Vent Mode: CMV/AC  (Continuous  Mandatory Ventilation/ Assist Control)  FiO2 (%): 25 %  Resp Rate (Set): 16 breaths/min  Tidal Volume (Set, mL): 450 mL  PEEP (cm H2O): 5 cmH2O  Pressure Support (cm H2O): 10 cmH2O  Resp: 16           Medications:     Current Facility-Administered Medications   Medication     - MEDICATION INSTRUCTIONS for Dialysis Patients -     acetylcysteine (MUCOMYST) 20 % nebulizer solution 2 mL     albuterol (PROVENTIL) neb solution 2.5 mg     carboxymethylcellulose PF (REFRESH PLUS) 0.5 % ophthalmic solution 1 drop     chlorhexidine (PERIDEX) 0.12 % solution 15 mL     dextrose 5% infusion     glucose gel 15-30 g    Or     dextrose 50 % injection 25-50 mL    Or     glucagon injection 1 mg     epoetin mirta-epbx (RETACRIT) injection 10,000 Units     folic acid injection 1 mg     hydrocortisone sodium succinate PF (solu-CORTEF) injection 25 mg     insulin aspart (NovoLOG) injection (RAPID ACTING)     [Auto Hold] insulin glargine (LANTUS PEN) injection 30 Units     ipratropium - albuterol 0.5 mg/2.5 mg/3 mL (DUONEB) neb solution 3 mL     lactulose (CHRONULAC) solution 20 g     midodrine (PROAMATINE) tablet 10 mg     multivitamins w/minerals liquid 15 mL     naloxone (NARCAN) injection 0.2 mg    Or     naloxone (NARCAN) injection 0.4 mg    Or     naloxone (NARCAN) injection 0.2 mg    Or     naloxone (NARCAN) injection 0.4 mg     nystatin (MYCOSTATIN) suspension 500,000 Units     ondansetron (ZOFRAN ODT) ODT tab 4 mg     oxyCODONE (ROXICODONE) tablet 5 mg     pantoprazole (PROTONIX) 80 mg in sodium chloride 0.9 % 100 mL infusion     rifaximin (XIFAXAN) tablet 550 mg     sodium chloride (PF) 0.9% PF flush 10-40 mL     tacrolimus (GENERIC EQUIVALENT) suspension 1 mg            General: Alert, oriented, not in distress. S/p Trach  Vitals: BP 94/64   Pulse 69   Temp 96.9  F (36.1  C) (Oral)   Resp 16   Wt 88 kg (194 lb 0.1 oz)   SpO2 100%   BMI 26.31 kg/m     SpO2: 100 %    HEENT: neck supple, symmetrical, no lymph node  enlargement, thyromegaly, bruit, JVD, pupils are isocoric and equally responsive to the light. Mallampati score, Grade III  Trach +. Healing lip lesions.  Lungs: both hemithoraces are symmetrical, normal to palpation, no dullness to percussion, auscultation of lungs revealed bilateral coarse crackles. Rubén chest tubes in place  CVS: Normal S1 and S2, no additional heart sounds, murmur, rub, normal peripheral pulses  Abdomen: Bowel sounds present, soft, Diffusely mildly tender, mildly distended, no organomegaly, ascitis, mass  Extremities/musculoskeletal: no peripheral edema, deformity, cyanosis, clubbing  Neurology: alert, orientedx3, no motor/sensorial deficits, cranial nerves grossly normal  Skin: no rash  Psychiatry: Mood and affect are appropriate       Exam of Line sites: No erythema or discharge.          Labs:     CBC RESULTS:     Recent Labs   LABS 12/19/22  0624 12/16/22  0626 12/15/22  0414 12/14/22  0412   WBCLab 4.1 3.4* 3.7* 2.8*   RBC 2.55* 2.05* 2.26* 2.11*   HGB 8.2* 6.6* 7.3* 7.0*   HCT 26.0* 22.3* 24.7* 23.1*   PLT 84* 83* 78* 58*    < > = values in this interval not displayed.       Basic Metabolic Panel:  Recent Labs   LABS 12/19/22  0624 12/16/22  2239 12/15/22  0414 12/14/22  0412   NALab 131* 135 135 136   POTASSIUM 4.4 4.0 4.0 4.1   CHLORIDE 94 99 99 99   CO2 26 26 30 28   BUN 75* 114* 72* 96*   CR 3.06* 3.35* 2.39* 3.36*   * 104* 136* 196*   KELLY 7.8* 8.0* 8.0* 7.9*    < > = values in this interval not displayed.       INR  Recent Labs   Lab 12/16/22 2239 12/16/22  1620   INR 1.14 1.58*

## 2022-12-20 NOTE — PROGRESS NOTES
[facility]  [facility]  Gastroenterology Progress note    Interval History:      Overnight events noted.  Pt with brief PEA arrest and grand mal seizure.  Trach on ventilator.      Vital Signs:      BP (!) 83/57   Pulse 70   Temp 97.5  F (36.4  C) (Oral)   Resp (!) 0   Wt 93.8 kg (206 lb 12.7 oz)   SpO2 100%   BMI 28.05 kg/m    Temp (24hrs), Av.5  F (36.4  C), Min:96.9  F (36.1  C), Max:97.7  F (36.5  C)    Patient Vitals for the past 72 hrs:   Weight   22 0400 93.8 kg (206 lb 12.7 oz)       Intake/Output Summary (Last 24 hours) at 2022 0758  Last data filed at 2022 0644  Gross per 24 hour   Intake 2260 ml   Output 1306 ml   Net 954 ml         Constitutional: NAD, comfortable  Cardiovascular: RRR, normal S1, S2   Respiratory: CTAB  Abdomen: soft, + mild diffuse tenderness, distended    Additional Comments:  ROS, FH, SH: See initial GI consult for details.    Laboratory Data:  Recent Labs   Lab Test 22  0624 22  0039 22  0519 22  2239 22  1620 22  1448 22  0513   WBC 4.3 4.1  --   --  3.9* 4.5   < > 8.0   HGB 8.2* 8.2* 8.2*   < > 7.9*  7.9* 7.0*   < > 7.8*   * 102*  --   --  106* 107*   < > 113*   PLT 84* 84*  --   --  94* 111*   < > 76*   INR  --   --   --   --  1.14 1.58*  --  1.16*    < > = values in this interval not displayed.     Recent Labs   Lab Test 22  0624 229   * 131* 135   POTASSIUM 3.8 4.4 4.0   CHLORIDE 96 94 99   CO2 28 26 26   BUN 50* 75* 114*   CR 2.22* 3.06* 3.35*   ANIONGAP 7 11 10   KELLY 7.6* 7.8* 8.0*     Recent Labs   Lab Test 22  0426 22  0624 22  2239 22  0626 22  0513 22  0439 22  0504 22  0447 22  1121 22  0403 22  1947 22  1859 07/10/18  0720 10/31/17  1038 16  0611 16  1055 16  0720 16  0741 16  0034 16  1259   ALBUMIN  2.0* 2.1*  1.9* 1.7* 2.2*   < > 1.9*   < > 2.0*   < > 2.1*   < >  --    < >  --    < >  --    < > 2.8*   < > 3.8   BILITOTAL 1.8*  --  0.9 0.6   < > 0.9   < > 1.1  --  1.6*  --   --    < >  --    < >  --    < > 2.3*   < > 6.8*   DBIL  --   --   --   --   --  0.7*  --  0.8*  --  1.4*  --   --    < >  --    < >  --   --  1.3*   < >  --    ALT 19  --  20 16   < > 24   < > 29  --  37  --   --    < >  --    < >  --    < > 726*   < > 26   AST 20  --  21 22   < > 19   < > 33  --  39  --   --    < >  --    < >  --    < > 570*   < > 32   ALKPHOS 147  --  251* 253*   < > 189*   < > 177*  --  210*  --   --    < >  --    < >  --    < > 51   < > 130   PROTEIN  --   --   --   --   --   --   --   --   --   --   --  70*  --  Negative  --  Negative   < >  --   --   --    LIPASE  --   --   --   --   --   --   --   --   --   --   --   --   --   --   --   --   --  63*  --   --    AMYLASE  --   --   --   --   --   --   --   --   --   --   --   --   --   --   --   --   --  72  --  86    < > = values in this interval not displayed.         Assessment:  56 yo male s/p liver transplant for CARRILLO cirrhosis at [facility] in 2016, s/p trach/PEG placement following respiratory failure and PEA due to strep/parainfluenza pneumonia complicated by multi system organ failure requiring HD, initially discharged 12/14 and then noted to have bloody stool and right sided hemothorax.  He was transferred to [facility] on 12/16.  The patient had a bloody stool overnight on 12/17 but has not had any further bloody bowel movements since.  Suspected possible iatrogenic rectal ulcer. Hemoglobin stable.  It was determined that no further endoscopic evaluation was needed at this time.    Patient reporting abdominal pain yesterday am.  CT A/P without contrast 12/19 showed a moderate to large amount of ascites, increased from previous.  No bowel obstruction or significant stool burden seen.  Improved pleural effusions and associated compressive atelectasis and/or infiltrate.   Paracentesis recommended yesterday but not completed due to patient receiving hemodialysis.  Patient on lactulose and rifaximin.  Lactulose held due to frequent, loose stools.  Patient had an episode overnight with brief PEA arrest.  A brief episode of CPR was given with rapid return to rhythm and adequate oxygenation.  CXR showed stable bilateral pleural effusions.  No pneumothorax.    He then developed a grand mal seizure.  He received Ativan and Keppra.  BP 83/57.    Plan:  -  Plan for paracentesis today if tolerates.  Fluid will be sent to rule out SBP.  Albumin infusion ordered if >5 liters removed.  -  Continue to monitor stool for signs of bleeding.    -  Monitor H/H; transfuse prn.  -  Immunosuppression per [facility] transplant team.  -  Xifaxan and Lactulose.  Titrate lactulose to 3 loose BM per day.  -  No plan for further endoscopic evaluation at this time.    Total Time Spent:  20 minutes    DONNIE Haynes  [facility]  Office:  [phone] call if needed after 5PM  Cell:  [phone], not available after 5PM at this number

## 2022-12-20 NOTE — PROGRESS NOTES
[facility] Intensive Care Unit  History and Physical  December 18, 2022      Blanco Osborne MRN# [id number]   Age: 57 year old YOB: 1965     Date of Admission: 12/16/2022    Primary care provider: Ki Powers     Active Problem List  1. Respiratory failure  2. Septic shock-resolved  3. L PTX-chest tube in place                 Treatment goals for next 24 hours:   1. Continue to monitor blood counts  2. Chest tube management and Thoracic surgery consult  3. Restarted graduated sequential PSTs                 Assessment and Plan:    57 year old male  admitted on 12/16/2022 for   SUMMARY: Blanco Osborne is a 57 year old male with paroxysmal A. fib, Liver transplant (2016), CHRISTIAN on BiPAP, h/o CVA and hypertension. He is s/p Trach/PEG following acute resp failure and PEA due to Strep/para influenza pna. He had septic shock with MSOF and is currently on iHD. He had Rt sided Chest tube placed for hydroptx on 12/12/22. He was transferred to [place] on 12/14/22 for CIP. Was noted to have bloody stool on 12/15 and 12/16 and had left Ptx which required left sided chest tube leading to ~900ml of bloody output.   He was given 2uPRBC's and transferred to [facility].    Neurology and Psychiatry: Sedation and analgesia achieved by;  1. H/o Embolic CVA: Elliquis is currently held and will start heparin drip once GI bleed subsides  2. CIP: Continue PT/OT     Pulmonary: Lung protective ventilation strategy with a tidal volume of 6-8mL/kg of ideal body weight, head of the bed elevated at 30 degrees, and oral care.  1. H/o Strep/Parainfluenza pna leading to Acute resp failure: Failed extubation x2. S/p Trach on 11/29/22. Will try sequential pressure support trials today, still requiring significant amount of pressure support. Will try PST during the day and full vent support at night.  2. H/o CHRISTIAN was on BiPAP, currently Trach'ed.  3. New Left Ptx with acute on hypoxic respiratory failure and ? hemithorax:  Etiology unclear. Pigtail placed on 12/16/2022 and significant decrease in volume of left pneumothorax  4. Hx of right hydropneumothorax: No output nor air leak. Concern for right hemothorax and will consult CT surgery for consideration of additional chest tube.      Cardiovascular system:  1. H/o HTN  2. Afib: On Eliquis but on hold due to ? GI bl;eed    Renal/Electrolytes:  1. LORETTA: was on CRRT but now on iHD. He was on MWF schedule, had HD session yesterday. No acute need noted. Still Anuric.    Plan:  - Nephrology for iHD needs    ID:  1. H/o Strep/Parainfluenza infection: Completed treatment course  2. H/o Aspergillus: Was on Vori for <15days.  3. 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    Plan:  - No acute issues.  -  Pleural fluid cultures are negative so far    GI/:  1. GI bleed: Had a bloody stool overnight on 12/17. GI team suspicious of ? iatrogenic rectal ulcer  2. Ascites: S/p Tap on 12/1 - no evidence of infection/Malignancy  3. LIver tx: Cont Tac goal level of 5 -7 and solucortef. Ammonia> 100 but having loose stools, alert and no need to continue to check values     Plan:  - Appreciate GI rcommedations, no dire need for scope now  - Eliquis is being held and will plan to start heparin drip prior to transition to Elliquis after GI bleed subsides and pleural fluid is less bloody.  - cont rifaximin and hold lactulose due to very frequent loose stools  - cont midodrine    Endocrine:  1. DM:    Plan:  - Was on lantus 30u daily. Will hold or cut down by 50%.  - SSI    Nutrition:   - Hold TF due to possible GI bleed. Will evaluate and recommence when bleed subsides    Heme/Onc:  1.  Chronic Anemia: Likely due to chronic illness and GI bleed  2.  ChronicThrombocytopenia:    Plan:  - Will monitor counts closely and transfuse accordingly    ICU prophylaxis:      DVT; SCDs        VAP; As above     Restrain needed: No     Stress ulcer: IV PPI     Central line: Needed today for IV access/Meds.    Code  Status: Full    Prognosis: Critical    Family update: Done at bedside by me.    Billing: total time spend providing critical care was 40 min, excluding the procedures.                           Social History:           Smoking:   Social History     Tobacco Use     Smoking status: Never     Smokeless tobacco: Never   Substance Use Topics     Alcohol use: Not Currently     Alcohol/week: 0.0 standard drinks     Drug use: No              Medications:     Current Facility-Administered Medications   Medication     - MEDICATION INSTRUCTIONS for Dialysis Patients -     acetylcysteine (MUCOMYST) 20 % nebulizer solution 2 mL     albuterol (PROVENTIL) neb solution 2.5 mg     carboxymethylcellulose PF (REFRESH PLUS) 0.5 % ophthalmic solution 1 drop     chlorhexidine (PERIDEX) 0.12 % solution 15 mL     dextrose 5% infusion     glucose gel 15-30 g    Or     dextrose 50 % injection 25-50 mL    Or     glucagon injection 1 mg     epoetin mirta-epbx (RETACRIT) injection 10,000 Units     folic acid injection 1 mg     hydrocortisone sodium succinate PF (solu-CORTEF) injection 25 mg     insulin aspart (NovoLOG) injection (RAPID ACTING)     [Auto Hold] insulin glargine (LANTUS PEN) injection 30 Units     ipratropium - albuterol 0.5 mg/2.5 mg/3 mL (DUONEB) neb solution 3 mL     lactulose (CHRONULAC) solution 20 g     midodrine (PROAMATINE) tablet 10 mg     multivitamins w/minerals liquid 15 mL     naloxone (NARCAN) injection 0.2 mg    Or     naloxone (NARCAN) injection 0.4 mg    Or     naloxone (NARCAN) injection 0.2 mg    Or     naloxone (NARCAN) injection 0.4 mg     nystatin (MYCOSTATIN) suspension 500,000 Units     ondansetron (ZOFRAN ODT) ODT tab 4 mg     oxyCODONE (ROXICODONE) tablet 5 mg     pantoprazole (PROTONIX) 80 mg in sodium chloride 0.9 % 100 mL infusion     rifaximin (XIFAXAN) tablet 550 mg     sodium chloride (PF) 0.9% PF flush 10-40 mL     tacrolimus (GENERIC EQUIVALENT) suspension 1 mg                   Physical  Examination:   General: Alert, oriented, not in distress. S/p Trach  Vitals: BP 94/64   Pulse 69   Temp 96.9  F (36.1  C) (Oral)   Resp 16   Wt 88 kg (194 lb 0.1 oz)   SpO2 100%   BMI 26.31 kg/m     SpO2: 100 %    HEENT: neck supple, symmetrical, no lymph node enlargement, thyromegaly, bruit, JVD, pupils are isocoric and equally responsive to the light. Mallampati score, Grade III  Trach +. Healing lip lesions.  Lungs: both hemithoraces are symmetrical, normal to palpation, no dullness to percussion, auscultation of lungs revealed bilateral coarse crackles. Rubén chest tubes in place  CVS: Normal S1 and S2, no additional heart sounds, murmur, rub, normal peripheral pulses  Abdomen: Bowel sounds present, soft, Diffusely mildly tender, mildly distended, no organomegaly, ascitis, mass  Extremities/musculoskeletal: no peripheral edema, deformity, cyanosis, clubbing  Neurology: alert, orientedx3, no motor/sensorial deficits, cranial nerves grossly normal  Skin: no rash  Psychiatry: Mood and affect are appropriate       Exam of Line sites: No erythema or discharge.          Labs:     CBC RESULTS:     Recent Labs   Lab 12/19/22  0624 12/16/22  2239 12/16/22  1620 12/16/22  0626 12/15/22  0414 12/14/22  0412   WBC 4.1 3.9* 4.5 3.4* 3.7* 2.8*   RBC 2.55* 2.43* 2.18* 2.05* 2.26* 2.11*   HGB 8.2* 7.9*  7.9* 7.0* 6.6* 7.3* 7.0*   HCT 26.0* 25.7* 23.3* 22.3* 24.7* 23.1*   PLT 84* 94* 111* 83* 78* 58*     Basic Metabolic Panel:  Recent Labs   Lab 12/19/22  0624 12/16/22  0626 12/15/22  0414 12/14/22  0412   * 137 135 136   POTASSIUM 4.4 4.3 4.0 4.1   CHLORIDE 94 98 99 99   CO2 26 26 30 28   BUN 75* 96.6* 72* 96*   CR 3.06* 3.10* 2.39* 3.36*   * 167* 136* 196*   KELLY 7.8* 8.1* 8.0* 7.9*     INR  Recent Labs   Lab 12/16/22  2239 12/16/22  1620   INR 1.14 1.58*        CT Abdomen/Pelvis 12/19/22

## 2022-12-20 NOTE — PLAN OF CARE
Goal Outcome Evaluation:  Overall Patient Progress: declining  Prior to significant event(PEA arrest and seizure around 0535 see Dr. Jones's note)  pt was following commands. Left sided weakness(Hx CVA). Lt chest tube patent with very minimal output.  Coarse lung sounds. Trach on Ventilator. Anuric; on HD. Loose stools x2(on Lactulose). Plan include paracentesis; see other new orders.

## 2022-12-20 NOTE — PROGRESS NOTES
[facility] ICU RESPIRATORY NOTE        Date of Admission: 12/16/2022    Date of Intubation (most recent): Chronic Trach.    Reason for Mechanical Ventilation: Respiratory Failure.    Number of Days on Mechanical Ventilation: Day 5.     Met Criteria for Spontaneous Breathing Trial: No.    Reason for No Spontaneous Breathing Trial: Per MD.     Significant Events Today: Transport to CT, MRI, ABG obtained, CPT restarted.     ABG Results:   Recent Labs   Lab 12/20/22  0719 12/17/22  1136   PH 7.43  --    PCO2 40  --    PO2 118*  --    HCO3 27  --    O2PER 30 30       Current Vent Settings: Vent Mode: CMV/AC  (Continuous Mandatory Ventilation/ Assist Control)  FiO2 (%): 30 %  Resp Rate (Set): 16 breaths/min  Tidal Volume (Set, mL): 430 mL  PEEP (cm H2O): 5 cmH2O  Pressure Support (cm H2O): 10 cmH2O  Resp: 14      Plan: Continue to monitor patient on full ventilatory settings overnight. Assess for weaning daily. Respiratory to continue to monitor.     Moises Martinez, RRT on 12/20/2022 at 5:40 PM

## 2022-12-21 ENCOUNTER — APPOINTMENT (OUTPATIENT)
Dept: GENERAL RADIOLOGY | Facility: CLINIC | Age: 57
DRG: 207 | End: 2022-12-21
Attending: THORACIC SURGERY (CARDIOTHORACIC VASCULAR SURGERY)
Payer: COMMERCIAL

## 2022-12-21 ENCOUNTER — HOSPITAL ENCOUNTER (INPATIENT)
Dept: NEUROLOGY | Facility: CLINIC | Age: 57
Discharge: HOME | DRG: 207 | End: 2022-12-21
Attending: SURGERY | Admitting: INTERNAL MEDICINE
Payer: COMMERCIAL

## 2022-12-21 LAB
ALBUMIN SERPL-MCNC: 1.9 G/DL (ref 3.4–5)
ALBUMIN SERPL-MCNC: 2 G/DL (ref 3.4–5)
ALP SERPL-CCNC: 133 U/L (ref 40–150)
ALT SERPL W P-5'-P-CCNC: 17 U/L (ref 0–70)
ANION GAP SERPL CALCULATED.3IONS-SCNC: 11 MMOL/L (ref 3–14)
APPEARANCE CSF: CLEAR
APTT PPP: 39 SECONDS (ref 22–38)
AST SERPL W P-5'-P-CCNC: 20 U/L (ref 0–45)
BACTERIA BLD CULT: NO GROWTH
BILIRUB DIRECT SERPL-MCNC: 0.6 MG/DL (ref 0–0.2)
BILIRUB SERPL-MCNC: 1.1 MG/DL (ref 0.2–1.3)
BUN SERPL-MCNC: 55 MG/DL (ref 7–30)
CALCIUM SERPL-MCNC: 7.5 MG/DL (ref 8.5–10.1)
CHLORIDE BLD-SCNC: 95 MMOL/L (ref 94–109)
CHOLEST SERPL-MCNC: 89 MG/DL
CO2 SERPL-SCNC: 24 MMOL/L (ref 20–32)
COLOR CSF: COLORLESS
CREAT SERPL-MCNC: 2.66 MG/DL (ref 0.66–1.25)
ERYTHROCYTE [DISTWIDTH] IN BLOOD BY AUTOMATED COUNT: 17.7 % (ref 10–15)
ERYTHROCYTE [DISTWIDTH] IN BLOOD BY AUTOMATED COUNT: 17.8 % (ref 10–15)
GFR SERPL CREATININE-BSD FRML MDRD: 27 ML/MIN/1.73M2
GLUCOSE BLD-MCNC: 120 MG/DL (ref 70–99)
GLUCOSE BLDC GLUCOMTR-MCNC: 102 MG/DL (ref 70–99)
GLUCOSE BLDC GLUCOMTR-MCNC: 103 MG/DL (ref 70–99)
GLUCOSE BLDC GLUCOMTR-MCNC: 107 MG/DL (ref 70–99)
GLUCOSE BLDC GLUCOMTR-MCNC: 111 MG/DL (ref 70–99)
GLUCOSE BLDC GLUCOMTR-MCNC: 115 MG/DL (ref 70–99)
GLUCOSE BLDC GLUCOMTR-MCNC: 123 MG/DL (ref 70–99)
GLUCOSE BLDC GLUCOMTR-MCNC: 130 MG/DL (ref 70–99)
GLUCOSE CSF-MCNC: 69 MG/DL (ref 40–70)
HCT VFR BLD AUTO: 26.2 % (ref 40–53)
HCT VFR BLD AUTO: 26.6 % (ref 40–53)
HDLC SERPL-MCNC: 27 MG/DL
HGB BLD-MCNC: 8 G/DL (ref 13.3–17.7)
HGB BLD-MCNC: 8.1 G/DL (ref 13.3–17.7)
INR PPP: 1.36 (ref 0.85–1.15)
LDLC SERPL CALC-MCNC: 41 MG/DL
MCH RBC QN AUTO: 30.8 PG (ref 26.5–33)
MCH RBC QN AUTO: 31 PG (ref 26.5–33)
MCHC RBC AUTO-ENTMCNC: 30.5 G/DL (ref 31.5–36.5)
MCHC RBC AUTO-ENTMCNC: 30.5 G/DL (ref 31.5–36.5)
MCV RBC AUTO: 101 FL (ref 78–100)
MCV RBC AUTO: 102 FL (ref 78–100)
NONHDLC SERPL-MCNC: 62 MG/DL
PHOSPHATE SERPL-MCNC: 5.8 MG/DL (ref 2.5–4.5)
PLATELET # BLD AUTO: 75 10E3/UL (ref 150–450)
PLATELET # BLD AUTO: 82 10E3/UL (ref 150–450)
POTASSIUM BLD-SCNC: 3.8 MMOL/L (ref 3.4–5.3)
PROT CSF-MCNC: 71 MG/DL (ref 15–60)
PROT SERPL-MCNC: 4.7 G/DL (ref 6.8–8.8)
RBC # BLD AUTO: 2.58 10E6/UL (ref 4.4–5.9)
RBC # BLD AUTO: 2.63 10E6/UL (ref 4.4–5.9)
RBC # CSF MANUAL: 58 /UL (ref 0–2)
SODIUM SERPL-SCNC: 130 MMOL/L (ref 133–144)
TRIGL SERPL-MCNC: 106 MG/DL
TUBE # CSF: 4
WBC # BLD AUTO: 3.4 10E3/UL (ref 4–11)
WBC # BLD AUTO: 4.4 10E3/UL (ref 4–11)
WBC # CSF MANUAL: 3 /UL (ref 0–5)

## 2022-12-21 PROCEDURE — 250N000011 HC RX IP 250 OP 636: Performed by: INTERNAL MEDICINE

## 2022-12-21 PROCEDURE — 87102 FUNGUS ISOLATION CULTURE: CPT | Performed by: INTERNAL MEDICINE

## 2022-12-21 PROCEDURE — 82945 GLUCOSE OTHER FLUID: CPT | Performed by: INTERNAL MEDICINE

## 2022-12-21 PROCEDURE — 258N000003 HC RX IP 258 OP 636: Performed by: INTERNAL MEDICINE

## 2022-12-21 PROCEDURE — C9113 INJ PANTOPRAZOLE SODIUM, VIA: HCPCS | Performed by: PHYSICIAN ASSISTANT

## 2022-12-21 PROCEDURE — 94640 AIRWAY INHALATION TREATMENT: CPT | Mod: 76

## 2022-12-21 PROCEDURE — 87015 SPECIMEN INFECT AGNT CONCNTJ: CPT | Performed by: INTERNAL MEDICINE

## 2022-12-21 PROCEDURE — 82248 BILIRUBIN DIRECT: CPT | Performed by: PHYSICIAN ASSISTANT

## 2022-12-21 PROCEDURE — 250N000011 HC RX IP 250 OP 636: Performed by: STUDENT IN AN ORGANIZED HEALTH CARE EDUCATION/TRAINING PROGRAM

## 2022-12-21 PROCEDURE — 009U3ZX DRAINAGE OF SPINAL CANAL, PERCUTANEOUS APPROACH, DIAGNOSTIC: ICD-10-PCS | Performed by: PSYCHIATRY & NEUROLOGY

## 2022-12-21 PROCEDURE — 250N000013 HC RX MED GY IP 250 OP 250 PS 637: Performed by: PHYSICIAN ASSISTANT

## 2022-12-21 PROCEDURE — 95714 VEEG EA 12-26 HR UNMNTR: CPT

## 2022-12-21 PROCEDURE — 80197 ASSAY OF TACROLIMUS: CPT | Performed by: SURGERY

## 2022-12-21 PROCEDURE — 250N000012 HC RX MED GY IP 250 OP 636 PS 637: Performed by: INTERNAL MEDICINE

## 2022-12-21 PROCEDURE — C9254 INJECTION, LACOSAMIDE: HCPCS | Performed by: PSYCHIATRY & NEUROLOGY

## 2022-12-21 PROCEDURE — 634N000001 HC RX 634: Performed by: SURGERY

## 2022-12-21 PROCEDURE — 87205 SMEAR GRAM STAIN: CPT | Performed by: INTERNAL MEDICINE

## 2022-12-21 PROCEDURE — 88108 CYTOPATH CONCENTRATE TECH: CPT | Mod: TC | Performed by: INTERNAL MEDICINE

## 2022-12-21 PROCEDURE — 84157 ASSAY OF PROTEIN OTHER: CPT | Performed by: INTERNAL MEDICINE

## 2022-12-21 PROCEDURE — 85027 COMPLETE CBC AUTOMATED: CPT | Performed by: INTERNAL MEDICINE

## 2022-12-21 PROCEDURE — 85730 THROMBOPLASTIN TIME PARTIAL: CPT | Performed by: PSYCHIATRY & NEUROLOGY

## 2022-12-21 PROCEDURE — 80061 LIPID PANEL: CPT | Performed by: PSYCHIATRY & NEUROLOGY

## 2022-12-21 PROCEDURE — 250N000013 HC RX MED GY IP 250 OP 250 PS 637: Performed by: INTERNAL MEDICINE

## 2022-12-21 PROCEDURE — 87483 CNS DNA AMP PROBE TYPE 12-25: CPT | Performed by: INTERNAL MEDICINE

## 2022-12-21 PROCEDURE — 94640 AIRWAY INHALATION TREATMENT: CPT

## 2022-12-21 PROCEDURE — 94003 VENT MGMT INPAT SUBQ DAY: CPT

## 2022-12-21 PROCEDURE — 84999 UNLISTED CHEMISTRY PROCEDURE: CPT | Performed by: INTERNAL MEDICINE

## 2022-12-21 PROCEDURE — 258N000003 HC RX IP 258 OP 636: Performed by: PSYCHIATRY & NEUROLOGY

## 2022-12-21 PROCEDURE — 258N000003 HC RX IP 258 OP 636: Performed by: STUDENT IN AN ORGANIZED HEALTH CARE EDUCATION/TRAINING PROGRAM

## 2022-12-21 PROCEDURE — 87533 HHV-6 DNA QUANT: CPT | Performed by: INTERNAL MEDICINE

## 2022-12-21 PROCEDURE — 85610 PROTHROMBIN TIME: CPT | Performed by: INTERNAL MEDICINE

## 2022-12-21 PROCEDURE — 999N000157 HC STATISTIC RCP TIME EA 10 MIN

## 2022-12-21 PROCEDURE — 84100 ASSAY OF PHOSPHORUS: CPT | Performed by: INTERNAL MEDICINE

## 2022-12-21 PROCEDURE — C9113 INJ PANTOPRAZOLE SODIUM, VIA: HCPCS | Performed by: INTERNAL MEDICINE

## 2022-12-21 PROCEDURE — 85014 HEMATOCRIT: CPT | Performed by: INTERNAL MEDICINE

## 2022-12-21 PROCEDURE — 89050 BODY FLUID CELL COUNT: CPT | Performed by: INTERNAL MEDICINE

## 2022-12-21 PROCEDURE — 999N000009 HC STATISTIC AIRWAY CARE

## 2022-12-21 PROCEDURE — 250N000011 HC RX IP 250 OP 636: Performed by: PHYSICIAN ASSISTANT

## 2022-12-21 PROCEDURE — 250N000011 HC RX IP 250 OP 636: Performed by: SURGERY

## 2022-12-21 PROCEDURE — 86592 SYPHILIS TEST NON-TREP QUAL: CPT | Performed by: INTERNAL MEDICINE

## 2022-12-21 PROCEDURE — 87529 HSV DNA AMP PROBE: CPT | Performed by: INTERNAL MEDICINE

## 2022-12-21 PROCEDURE — 250N000009 HC RX 250: Performed by: INTERNAL MEDICINE

## 2022-12-21 PROCEDURE — 250N000011 HC RX IP 250 OP 636: Performed by: PSYCHIATRY & NEUROLOGY

## 2022-12-21 PROCEDURE — P9045 ALBUMIN (HUMAN), 5%, 250 ML: HCPCS | Performed by: STUDENT IN AN ORGANIZED HEALTH CARE EDUCATION/TRAINING PROGRAM

## 2022-12-21 PROCEDURE — 82784 ASSAY IGA/IGD/IGG/IGM EACH: CPT | Performed by: PSYCHIATRY & NEUROLOGY

## 2022-12-21 PROCEDURE — 90937 HEMODIALYSIS REPEATED EVAL: CPT

## 2022-12-21 PROCEDURE — 95720 EEG PHY/QHP EA INCR W/VEEG: CPT | Performed by: PSYCHIATRY & NEUROLOGY

## 2022-12-21 PROCEDURE — 200N000001 HC R&B ICU

## 2022-12-21 PROCEDURE — 83916 OLIGOCLONAL BANDS: CPT | Performed by: PSYCHIATRY & NEUROLOGY

## 2022-12-21 PROCEDURE — 71045 X-RAY EXAM CHEST 1 VIEW: CPT

## 2022-12-21 PROCEDURE — 99291 CRITICAL CARE FIRST HOUR: CPT | Mod: GC | Performed by: PSYCHIATRY & NEUROLOGY

## 2022-12-21 PROCEDURE — 99233 SBSQ HOSP IP/OBS HIGH 50: CPT | Performed by: STUDENT IN AN ORGANIZED HEALTH CARE EDUCATION/TRAINING PROGRAM

## 2022-12-21 PROCEDURE — 99291 CRITICAL CARE FIRST HOUR: CPT | Performed by: INTERNAL MEDICINE

## 2022-12-21 RX ORDER — B COMPLEX C NO.10/FOLIC ACID 900MCG/5ML
5 LIQUID (ML) ORAL DAILY
Status: DISCONTINUED | OUTPATIENT
Start: 2022-12-21 | End: 2022-12-29 | Stop reason: HOSPADM

## 2022-12-21 RX ORDER — DEXTROSE MONOHYDRATE 100 MG/ML
INJECTION, SOLUTION INTRAVENOUS CONTINUOUS PRN
Status: DISCONTINUED | OUTPATIENT
Start: 2022-12-21 | End: 2022-12-29 | Stop reason: HOSPADM

## 2022-12-21 RX ORDER — ACYCLOVIR 200 MG/5ML
200 SUSPENSION ORAL EVERY 12 HOURS
Status: DISCONTINUED | OUTPATIENT
Start: 2022-12-21 | End: 2022-12-21

## 2022-12-21 RX ADMIN — LORAZEPAM 2 MG: 2 INJECTION INTRAMUSCULAR; INTRAVENOUS at 11:31

## 2022-12-21 RX ADMIN — TACROLIMUS 1 MG: 5 CAPSULE ORAL at 22:06

## 2022-12-21 RX ADMIN — MIDODRINE HYDROCHLORIDE 10 MG: 5 TABLET ORAL at 11:38

## 2022-12-21 RX ADMIN — RIFAXIMIN 550 MG: 550 TABLET ORAL at 09:02

## 2022-12-21 RX ADMIN — RIFAXIMIN 550 MG: 550 TABLET ORAL at 20:04

## 2022-12-21 RX ADMIN — MULTIVITAMIN 15 ML: LIQUID ORAL at 09:02

## 2022-12-21 RX ADMIN — SODIUM CHLORIDE 200 MG: 9 INJECTION, SOLUTION INTRAVENOUS at 13:07

## 2022-12-21 RX ADMIN — ACYCLOVIR SODIUM 700 MG: 50 INJECTION, SOLUTION INTRAVENOUS at 14:21

## 2022-12-21 RX ADMIN — TACROLIMUS 1 MG: 5 CAPSULE ORAL at 09:02

## 2022-12-21 RX ADMIN — HYDROCORTISONE SODIUM SUCCINATE 25 MG: 100 INJECTION, POWDER, FOR SOLUTION INTRAMUSCULAR; INTRAVENOUS at 01:38

## 2022-12-21 RX ADMIN — Medication 5 ML: at 14:39

## 2022-12-21 RX ADMIN — LORAZEPAM 2 MG: 2 INJECTION INTRAMUSCULAR; INTRAVENOUS at 11:24

## 2022-12-21 RX ADMIN — HYDROCORTISONE SODIUM SUCCINATE 25 MG: 100 INJECTION, POWDER, FOR SOLUTION INTRAMUSCULAR; INTRAVENOUS at 09:01

## 2022-12-21 RX ADMIN — Medication 1 DROP: at 22:07

## 2022-12-21 RX ADMIN — SODIUM CHLORIDE 8 MG/HR: 9 INJECTION, SOLUTION INTRAVENOUS at 01:34

## 2022-12-21 RX ADMIN — ACYCLOVIR: 50 OINTMENT TOPICAL at 04:32

## 2022-12-21 RX ADMIN — FOLIC ACID 1 MG: 5 INJECTION, SOLUTION INTRAMUSCULAR; INTRAVENOUS; SUBCUTANEOUS at 09:43

## 2022-12-21 RX ADMIN — LEVETIRACETAM 1000 MG: 10 INJECTION INTRAVENOUS at 11:34

## 2022-12-21 RX ADMIN — LORAZEPAM 2 MG: 2 INJECTION INTRAMUSCULAR; INTRAVENOUS at 09:57

## 2022-12-21 RX ADMIN — IPRATROPIUM BROMIDE AND ALBUTEROL SULFATE 3 ML: .5; 3 SOLUTION RESPIRATORY (INHALATION) at 11:40

## 2022-12-21 RX ADMIN — PANTOPRAZOLE SODIUM 40 MG: 40 INJECTION, POWDER, FOR SOLUTION INTRAVENOUS at 11:38

## 2022-12-21 RX ADMIN — MIDODRINE HYDROCHLORIDE 10 MG: 5 TABLET ORAL at 17:39

## 2022-12-21 RX ADMIN — NYSTATIN 500000 UNITS: 100000 SUSPENSION ORAL at 17:39

## 2022-12-21 RX ADMIN — HYDROCORTISONE SODIUM SUCCINATE 25 MG: 100 INJECTION, POWDER, FOR SOLUTION INTRAMUSCULAR; INTRAVENOUS at 20:03

## 2022-12-21 RX ADMIN — CHLORHEXIDINE GLUCONATE 0.12% ORAL RINSE 15 ML: 1.2 LIQUID ORAL at 09:02

## 2022-12-21 RX ADMIN — LACTULOSE 20 G: 20 SOLUTION ORAL at 22:07

## 2022-12-21 RX ADMIN — EPOETIN ALFA-EPBX 10000 UNITS: 10000 INJECTION, SOLUTION INTRAVENOUS; SUBCUTANEOUS at 09:30

## 2022-12-21 RX ADMIN — SODIUM CHLORIDE 300 ML: 9 INJECTION, SOLUTION INTRAVENOUS at 09:40

## 2022-12-21 RX ADMIN — NYSTATIN 500000 UNITS: 100000 SUSPENSION ORAL at 09:02

## 2022-12-21 RX ADMIN — ALBUMIN HUMAN 250 ML: 0.05 INJECTION, SOLUTION INTRAVENOUS at 09:36

## 2022-12-21 RX ADMIN — NYSTATIN 500000 UNITS: 100000 SUSPENSION ORAL at 22:07

## 2022-12-21 RX ADMIN — IPRATROPIUM BROMIDE AND ALBUTEROL SULFATE 3 ML: .5; 3 SOLUTION RESPIRATORY (INHALATION) at 19:21

## 2022-12-21 RX ADMIN — SODIUM CHLORIDE 100 MG: 9 INJECTION, SOLUTION INTRAVENOUS at 20:44

## 2022-12-21 RX ADMIN — IPRATROPIUM BROMIDE AND ALBUTEROL SULFATE 3 ML: .5; 3 SOLUTION RESPIRATORY (INHALATION) at 16:19

## 2022-12-21 RX ADMIN — HYDROCORTISONE SODIUM SUCCINATE 25 MG: 100 INJECTION, POWDER, FOR SOLUTION INTRAMUSCULAR; INTRAVENOUS at 14:22

## 2022-12-21 RX ADMIN — LACTULOSE 20 G: 20 SOLUTION ORAL at 17:39

## 2022-12-21 RX ADMIN — Medication 1 DROP: at 17:39

## 2022-12-21 RX ADMIN — NYSTATIN 500000 UNITS: 100000 SUSPENSION ORAL at 14:22

## 2022-12-21 RX ADMIN — Medication 1 DROP: at 09:02

## 2022-12-21 RX ADMIN — IPRATROPIUM BROMIDE AND ALBUTEROL SULFATE 3 ML: .5; 3 SOLUTION RESPIRATORY (INHALATION) at 07:51

## 2022-12-21 RX ADMIN — MIDODRINE HYDROCHLORIDE 10 MG: 5 TABLET ORAL at 09:02

## 2022-12-21 RX ADMIN — CHLORHEXIDINE GLUCONATE 0.12% ORAL RINSE 15 ML: 1.2 LIQUID ORAL at 20:03

## 2022-12-21 ASSESSMENT — ACTIVITIES OF DAILY LIVING (ADL)
ADLS_ACUITY_SCORE: 51
ADLS_ACUITY_SCORE: 51
ADLS_ACUITY_SCORE: 47
ADLS_ACUITY_SCORE: 51
ADLS_ACUITY_SCORE: 51
ADLS_ACUITY_SCORE: 47
ADLS_ACUITY_SCORE: 51
ADLS_ACUITY_SCORE: 51

## 2022-12-21 NOTE — PROGRESS NOTES
[facility] Intensive Care Unit  History and Physical  December 21, 2022      Blanco Osborne MRN# [id number]   Age: 57 year old YOB: 1965     Date of Admission: 12/16/2022     Problem List:  1. Acute respiratory failure/PEA arrest X 2   A) presumed mucus plug instigating above 12/20/22    B)  L PTX   C)  R PTX-resolved  2. Seizures-focal  3. S/P Liver tx 2016  4. Acute renal failure-On HD        Overnight events:  Seizure this AM while on HD. Some transient hemodynamic lability.         Treatment goals for next 24 hours:   1. LP today per Neuro ICU  2. EEG -ongoing  3. Empiric IV acyclovir  4. Possible paracentesis later today        Alena Valenzuela MD  #[id number]  Critical Care Total Time 45 minutes-exclusive of procedures.             Assessment and Plan:     57 year old male with paroxysmal A. fib, Liver transplant (2016), CHRISTIAN on BiPAP, h/o CVA and hypertension. Patient  s/p Trach/PEG following acu had Rt sided Chest tube placed for hydroptx on 12/12/22 and  transferred to [place] on 12/14/22 for CIP.   Pt  noted to have bloody stool on 12/15 and 12/16 and had left Ptx which required left sided chest tube leading to ~900ml of bloody output. Patient with PEA arrest 12/20/22 and now with seizure while on HD.    Neurology and Psychiatry:  Seizure: Acute mental status change with focal motor movements of mouth and EEG correlate X 2  - Neuro ICU following-reloaded with Keppra after resolution of above with IV Ativan  - MRI head from 12/20/22 -No PRES or leptomeningeal enhancement  - Prior oral Herpes (still on topical) and now concern for HSV encephalitis  1. PEA arrest/? Mucus plug- not awake-CT head without acute process  2. H/o Embolic CVA: Elliquis is held secondary to presumed GI bleed  Plan:  1. LP per NICU  2. EEG-onoing  3. Keppra load  4. Empiric IV Acyclovir       Pulmonary:  PEA/respiratory arrest earlier today-per report mucus plug  - Bronchoscopy 12/20/22- Unrevealing for mucus plugs or  ANY secretions  S/P Trach (11/29) on vent-failed extubation X 2  - remains on full vent support  2. H/o CHRISTIAN was on BiPAP  3. New Left Ptx with acute on hypoxic respiratory failure and ? hemithorax: Etiology unclear. Pigtail placed on 12/16/2022 and significant decrease in volume of left pneumothorax  4. Hx of right hydropneumothorax: No output nor air leak. -  - Removed 12/19/22 per CT surgery  Plan:  1. Discontinue mucomyst  2. Discontinue Volera  3. Continue L chest tube to suction  4. Continue full vent support      Cardiovascular system:  As above-s/p PEA arrest earlier today  - CPR  ~ 2 min no medications administered  - TTECHO 12/20/22: EF 70-75%, RV normal size/fx , negative bubble, no valvular disease, unable to assess RVSP  1. H/o HTN  2. Afib: On Eliquis but on hold due prior thought of GI bleed  Plan:  1. ECHO  4. Vasopressors as needed-maintain MAP >70 mm Hg- hold midodrine     Renal/Electrolytes:  1. LORETTA: was on CRRT but now on iHD.   - MWF schedule- had HD session 12/19/22- tolerated UF  Plan:  1. May need CRRT if     ID:  1. H/o Strep/Parainfluenza infection: Completed treatment course  2. H/o Aspergillus: Was on Vori for <15days.  3. 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  - Sputum from 12/20/22-staph epi and candida parapsilosis complex  - Blood cx NGTD  Plan:  1. Ceftriaxone IV  2. Empiric Acyclovir      GI/:  1. GI bleed: No clear evidence ongoing Apparently had bloody stool overnight on 12/17. GI team suspicious of ? iatrogenic rectal ulcer- no   2. Ascites: S/p Tap on 12/1 - no evidence of infection/Malignancy  - Of note-patient with markedly enlarged spleen prior to transplant that has not regressed post liver tx in 2016.  3. LIver tx: Cont Tac goal level of 5 -7 and solucortef.   CT abdomen/pelvis 12/19/22-mod-large ascites/enlarged spleen as above. No other obvious issues.  Now with diarrhea-lactulose restarted  NH3  wnl  Plan:  1. Paracentesis anticipated later today/tomorrow      Endocrine:  1. DM:  Plan:  - Was on lantus 30u daily. Will hold or cut down by 50%.  - SSI    Nutrition:  Resume TF. Will evaluate and recommence when bleed subsides    Heme/Onc:  1.  Pancytopenia;  Lymphopenic on diff  - Chronic splenomegaly-present prior to liver tx but has not regressed. Prior CT chest with evidence of subclavicular LN  ? Indolent blood dyscrasia  Plan:  1. Peripheral smear  2. Review recent scans for adenopathy.    ICU prophylaxis:      DVT; SCDs        VAP; As above     Restrain needed: No     Stress ulcer: IV PPI     Central line: Needed today for IV access/Meds.    Code Status: Full    Prognosis: Critical    Family update: Done at bedside by with patient's wife in attendance.                 Mechanical Ventilation:     Vent Mode: CMV/AC  (Continuous Mandatory Ventilation/ Assist Control)  FiO2 (%): 30 %  Resp Rate (Set): 16 breaths/min  Tidal Volume (Set, mL): 450 mL  PEEP (cm H2O): 5 cmH2O  Resp: 21           Medications:     Current Facility-Administered Medications   Medication     - MEDICATION INSTRUCTIONS for Dialysis Patients -     acetylcysteine (MUCOMYST) 20 % nebulizer solution 2 mL     albuterol (PROVENTIL) neb solution 2.5 mg     epoetin mirta-epbx (RETACRIT) injection 10,000 Units     folic acid injection 1 mg     hydrocortisone sodium succinate PF (solu-CORTEF) injection 25 mg     insulin aspart (NovoLOG) injection (RAPID ACTING)     [Auto Hold] insulin glargine (LANTUS PEN) injection 30 Units     ipratropium - albuterol 0.5 mg/2.5 mg/3 mL (DUONEB) neb solution 3 mL     lactulose (CHRONULAC) solution 20 g     levETIRAcetam (KEPPRA) 750 mg in sodium chloride 0.9 % 100 mL intermittent infusion     LORazepam (ATIVAN) injection 2 mg     midodrine (PROAMATINE) tablet 10 mg     multivitamins w/minerals liquid 15 mL     norepinephrine (LEVOPHED) 4 mg in  mL infusion PREMIX     nystatin (MYCOSTATIN) suspension 500,000  Units     ondansetron (ZOFRAN ODT) ODT tab 4 mg     oxyCODONE (ROXICODONE) tablet 5 mg     pantoprazole (PROTONIX) 80 mg in sodium chloride 0.9 % 100 mL infusion     propofol (DIPRIVAN) infusion     rifaximin (XIFAXAN) tablet 550 mg     sodium chloride (PF) 0.9% PF flush 10-40 mL     tacrolimus (GENERIC EQUIVALENT) suspension 1 mg            General: Alert, oriented, not in distress. S/p Trach  Vitals: /71   Pulse 77   Temp 96.8  F (36  C) (Axillary)   Resp 21   Wt 93.8 kg (206 lb 12.7 oz)   SpO2 100%   BMI 28.05 kg/m     SpO2: 100 %    HEENT: neck supple, symmetrical, no lymph node enlargement, thyromegaly, bruit, JVD, pupils are isocoric and equally responsive to the light. Mallampati score, Grade III  Trach +. Healing lip lesions.  Lungs: both hemithoraces are symmetrical, normal to palpation, no dullness to percussion, auscultation of lungs revealed bilateral coarse crackles. Rubén chest tubes in place  CVS: Normal S1 and S2, no additional heart sounds, murmur, rub, normal peripheral pulses  Abdomen: Bowel sounds present, soft, Diffusely mildly tender, mildly distended, no organomegaly, ascitis, mass  Extremities/musculoskeletal: no peripheral edema, deformity, cyanosis, clubbing  Neurology: alert, orientedx3, no motor/sensorial deficits, cranial nerves grossly normal  Skin: no rash  Psychiatry: Mood and affect are appropriate       Exam of Line sites: No erythema or discharge.          Labs:     CBC RESULTS:       Recent Labs   LABS 12/21/22 12/20/22 12/19/22  0624 12/16/22  0626   WBCLab 3.4 4.3 4.1 3.4*   RBC   2.55* 2.05*   HGB 8.0 8.2 8.2* 6.6*   HCT 26.2 26.5 26.0* 22.3*   PLT 75 84 84* 83*      < > = values in this interval not displayed.       Basic Metabolic Panel:    Recent Labs   LABS 12/21/22 12/20/22 12/19/22  0624 12/16/22  2239   Sodium 130 131 131* 135   Potassium 3.8 3.8 4.4 4.0   Chloride 95 96 94 99   CO2 24 28 26 26   BUN 55 50 75* 114*   CR 2.66 2.22 3.06* 3.35*     133*  104*   KELLY 7.5 7.6 7.8* 8.0*      < > = values in this interval not displayed.       INR  Recent Labs   Lab 12/21/22  1315 12/16/22  2239 12/16/22  1620   INR 1.36* 1.14 1.58*

## 2022-12-21 NOTE — PROGRESS NOTES
Renal Medicine Progress Note            Assessment/Plan:     Assessment: Blanco Osborne is a 56 yo male with PMH CARRILLO cirrhosis s/p liver tx (2016), afib, HTN, CHRISTIAN, CVA and recent prolonged admission after PEA arrest s/p trach/PEG re-admitted 12/16/2022 with hematochezia and L hemothorax s/p CT.     Anuric LORETTA requiring dialysis    Due to PEA arrest. CRRT discontinued 11/26, IHD started 11/29, remains on MWF schedule.   - HD today, 2h only. No UF due to hypotension and seizure.   - will assess for dialysis needs tomorrow, if remains hypotensive may need pressors vs CRRT     Anemia of renal disease  Hematochezia, resolved    - epo 10K with dialysis   - GI consulted for poss GIB    Chronic hypotension  Possibly due to cirrhosis   - midodrine 10 mg TID     Acute on chronic hypoxic respiratory failure   S/p tracheostomy 11/29/22  Hemopneumothorax s/p L CT   H/o aspergillus PNA and multiple bacterial PNAs during last admission. Recent hemopneumothorax s/p L CT necessitating re-admission. Mucus plugging event 12/20 resulting in brief PEA arrest and severe hypoxia.     Seizures  Initial seizure after PEA arrest 12/20. Another seizure 12/21 during dialysis run after episode of mild hypotension (no hypoxia noted). Neurology following.     CARRILLO cirrhosis s/p liver tx  Immunosuppression per GI.          Interval History:     - no acute events overnight   - bronch yesterday with only small mucous over ETT, otherwise normal   - patient had another seizure this AM, right at start of dialysis during mild hypotension per report. BFR lowered and UF discontinued. Fluid bolus given x2. BP remained soft. After 2 hours of dialysis, we stopped to avoid further hypotensive events. No significant fluid or electrolyte abnormalities necessitating urgent dialysis today. Will re-evaluate needs tomorrow           Medications and Allergies:       - MEDICATION INSTRUCTIONS for Dialysis Patients -   Does not apply See Admin Instructions      sodium chloride (PF) 0.9%  10 mL Intracatheter During Dialysis/CRRT (from stock)     sodium chloride (PF) 0.9%  10 mL Intracatheter During Dialysis/CRRT (from stock)     acyclovir   Topical Q8H     carboxymethylcellulose PF  1 drop Both Eyes TID     chlorhexidine  15 mL Mouth/Throat Q12H     epoetin mirta-epbx  10,000 Units Subcutaneous Once per day on Mon Wed Fri     folic acid  1 mg Intravenous Daily     hydrocortisone sodium succinate PF  25 mg Intravenous Q6H     insulin aspart  1-6 Units Subcutaneous Q4H     [Auto Hold] insulin glargine  30 Units Subcutaneous QAM AC     ipratropium - albuterol 0.5 mg/2.5 mg/3 mL  3 mL Nebulization 4x daily     lactulose  20 g Oral or Feeding Tube TID     levETIRAcetam  1,000 mg Intravenous Q24H     midodrine  10 mg Oral or Feeding Tube TID w/meals     multivitamins w/minerals  15 mL Per Feeding Tube Daily     nystatin  500,000 Units Swish & Swallow 4x Daily     pantoprazole  40 mg Intravenous BID 09 12     rifaximin  550 mg Per Feeding Tube BID     tacrolimus  1 mg Oral or Feeding Tube BID      No Known Allergies         Physical Exam:   Vitals were reviewed  /71   Pulse 77   Temp 96.8  F (36  C) (Axillary)   Resp 21   Wt 93.8 kg (206 lb 12.7 oz)   SpO2 100%   BMI 28.05 kg/m      Wt Readings from Last 3 Encounters:   12/20/22 93.8 kg (206 lb 12.7 oz)   12/16/22 100.2 kg (221 lb)   12/16/22 100.2 kg (221 lb)       Intake/Output Summary (Last 24 hours) at 12/20/2022 1146  Last data filed at 12/20/2022 1000  Gross per 24 hour   Intake 2360 ml   Output 1306 ml   Net 1054 ml       GENERAL APPEARANCE: trached, not responding to voice  HEENT:  MMM  RESP: clear bilaterally   CV: RRR  ABDOMEN: soft, non-tender, non-distended  EXTREMITIES/SKIN: no LE edema  NEURO:  Would not awaken to voice. Grimaced in discomfort.  LINES: L CT, RIJ TDC clean and intact            Data:     BMP  Recent Labs   Lab 12/21/22  1150 12/21/22  0859 12/21/22  0406 12/21/22  0405 12/20/22  0432  12/20/22  0426 12/19/22  0940 12/19/22  0624 12/17/22  0253 12/16/22 2239   NA  --   --   --  130*  --  131*  --  131*  --  135   POTASSIUM  --   --   --  3.8  --  3.8  --  4.4  --  4.0   CHLORIDE  --   --   --  95  --  96  --  94  --  99   KELLY  --   --   --  7.5*  --  7.6*  --  7.8*  --  8.0*   CO2  --   --   --  24  --  28  --  26  --  26   BUN  --   --   --  55*  --  50*  --  75*  --  114*   CR  --   --   --  2.66*  --  2.22*  --  3.06*  --  3.35*   * 107* 103* 120*   < > 129*   < > 133*   < > 104*    < > = values in this interval not displayed.     CBC  Recent Labs   Lab 12/21/22  0405 12/20/22  0426 12/19/22  0624 12/19/22  0039 12/17/22  0519 12/16/22 2239   WBC 4.4 4.3 4.1  --   --  3.9*   HGB 8.1* 8.2* 8.2* 8.2*   < > 7.9*  7.9*   HCT 26.6* 26.5* 26.0*  --   --  25.7*   * 102* 102*  --   --  106*   PLT 82* 84* 84*  --   --  94*    < > = values in this interval not displayed.     Lab Results   Component Value Date    AST 20 12/21/2022    ALT 17 12/21/2022    ALKPHOS 133 12/21/2022    BILITOTAL 1.1 12/21/2022    CHANDLER 32 12/20/2022     Lab Results   Component Value Date    INR 1.14 12/16/2022       Attestation:  I have reviewed today's vital signs, notes, medications, labs and imaging.    Dwayne Laboy MD  [facility] - Nephrology  Office: [phone]

## 2022-12-21 NOTE — PROGRESS NOTES
[facility] ICU RESPIRATORY NOTE        Date of Admission: 12/16/2022  Date of Intubation (most recent): Chronic Trach.    Reason for Mechanical Ventilation: resp failue    Number of Days on Mechanical Ventilation: 6    Met Criteria for Spontaneous Breathing Trial:  NO    Reason for No Spontaneous Breathing Trial: Per MD    Significant Events Today: None    ABG Results:   Recent Labs   Lab 12/20/22  0719 12/17/22  1136   PH 7.43  --    PCO2 40  --    PO2 118*  --    HCO3 27  --    O2PER 30 30       Current Vent Settings: Vent Mode: CMV/AC  (Continuous Mandatory Ventilation/ Assist Control)  FiO2 (%): 30 %  Resp Rate (Set): 16 breaths/min  Tidal Volume (Set, mL): 430 mL  PEEP (cm H2O): 5 cmH2O  Resp: 27      Skin Assessment: intact    Plan: Follow vent management protocol    RT Nikolai on 12/21/2022 at 6:14 AM

## 2022-12-21 NOTE — PLAN OF CARE
Goal Outcome Evaluation:      Plan of Care Reviewed With:  (Interdisciplinary bedside rounds)    Overall Patient Progress: no changeOverall Patient Progress: no change    Outcome Evaluation: Plan for TF resumption today.  See RD notes for details.    Martha Sandoval RD, LD, CNSC   Clinical Dietitian - [facility]

## 2022-12-21 NOTE — PROGRESS NOTES
[facility] ICU RESPIRATORY NOTE        Date of Admission: 12/16/2022    Date of Intubation (most recent): Chronic Trach.    Reason for Mechanical Ventilation: Respiratory Failure.    Number of Days on Mechanical Ventilation: Day 6.    Met Criteria for Spontaneous Breathing Trial: No.    Reason for No Spontaneous Breathing Trial: Per MD.    Significant Events Today: Volera treatments discontinued, lumbar puncture performed at bedside.     ABG Results:   Recent Labs   Lab 12/20/22  0719 12/17/22  1136   PH 7.43  --    PCO2 40  --    PO2 118*  --    HCO3 27  --    O2PER 30 30       Current Vent Settings: Vent Mode: CMV/AC  (Continuous Mandatory Ventilation/ Assist Control)  FiO2 (%): 30 %  Resp Rate (Set): 16 breaths/min  Tidal Volume (Set, mL): 450 mL  PEEP (cm H2O): 5 cmH2O  Resp: 21      Plan: Continue to monitor patient on full ventilatory settings overnight.     Moises Martinez, RRT on 12/21/2022 at 5:21 PM

## 2022-12-21 NOTE — CONSULTS
CLINICAL NUTRITION SERVICES - REASSESSMENT NOTE      Recommendations Ordered by Registered Dietitian (RD): Plan for preliminary goal TF for the first 5-7 days --> Novasource Renal at 40 mL/hr = 1920 kcal (22 kcal/kg), 87 g protein (1 g/kg), 176 g CHO, 0 g fiber, 688 mL H2O  This will meet ~2/3 needs    Future/Additional Recommendations: Recommend eventual goal of Novasource Renal at 60 mL/hr= 2880 kcal, 131 g protein, 264 g CHO, 0 g fiber, 1032 mL H2O    Malnutrition: 12/19  % Weight Loss:  > 5% in 1 month (severe malnutrition)  % Intake:  </= 50% for >/= 5 days (severe malnutrition)(will meet 12/20)  Subcutaneous Fat Loss:  Orbital region severe depletion, Upper arm region severe depletion and Thoracic region severe depletion  Muscle Loss:  Temporal region severe depletion, Clavicle bone region severe depletion, Patellar region severe depletion, Anterior thigh region severe depletion and Posterior calf region severe depletion  Fluid Retention:  Mild as above      Malnutrition Diagnosis: Severe malnutrition  In Context of:  Acute illness or injury       EVALUATION OF PROGRESS TOWARD GOALS   Diet:  NPO with Trach     Nutrition Support:  Plan to resume TF today   Patient is now day #6 NPO       ASSESSED NUTRITION NEEDS:  Dosing Weight 88 kg   Estimated Energy Needs: 3089-7092 kcals (30-35 Kcal/Kg)  Justification: repletion   Estimated Protein Needs: > 130 grams protein (> 1.5 g/kg g pro/Kg)  Justification: hypercatabolism with acute illness and dialysis, repletion   Estimated Fluid Needs: 7823-0581 mL (1 mL/Kcal)  Justification: maintenance      NEW FINDINGS:   12/19:  WOCN =   Wound location: Trach Site   Wound due to: Moisture Associated Skin Damage (MASD) and Surgical Wound   STATUS: initial assessment       Wound location: PEG Site   Wound due to: Moisture Associated Skin Damage (MASD)   STATUS: initial assessment       Wound location: Philtrum/Columella   Wound due to: Viral Lesions   STATUS: initial  assessment    12/20:  Brief PEA arrest d/t mucous plug, grand mal seizure   12/20:  MRI of brain = 1. Scattered tiny recent ischemic infarcts in the cerebral hemispheres   bilaterally consistent with embolic infarcts from a central embolic   source.   2. Diffuse cerebral volume loss and cerebral white matter changes   consistent with chronic small vessel ischemic disease.   12/20:  Bronch = Normal   12/21:  Paracentesis =     Patient was previously receiving D5 IVF but this was discontinued on 12/20    Norepi off, Vaso to start   Na 130 (L)  K normal, Phos 5.8 (H) - HD  BGM < 180  I/O 590/115, no weight today   Large BM x 4 yesterday (on Lactulose) - rectal tube placed but now no further stooling so plan to discontinue the rectal tube     Previous Goals (12/19):   TF will resume within the next 24 hours  Evaluation: Not met    Previous Nutrition Diagnosis (12/19):   Inadequate protein-energy intake related to NPO. TF on hold as evidenced by meeting 0% protein and 6% energy needs from D5 IVF   Evaluation: No change      CURRENT NUTRITION DIAGNOSIS  Inadequate protein-energy intake related to NPO with plans to resume TF today as evidenced by meeting 0% needs     INTERVENTIONS  Recommendations / Nutrition Prescription  Plan for preliminary goal TF for the first 5-7 days --> Novasource Renal at 40 mL/hr = 1920 kcal (22 kcal/kg), 87 g protein (1 g/kg), 176 g CHO, 0 g fiber, 688 mL H2O  This will meet ~2/3 needs     Recommend eventual goal of Novasource Renal at 60 mL/hr= 2880 kcal, 131 g protein, 264 g CHO, 0 g fiber, 1032 mL H2O     Implementation  EN Composition, EN Schedule, Multivitamin/Mineral:  TF orders written to begin at 10 mL/hr and increase every 8 hours by 15 mL to goal of 40 mL/hr.  Nephronex as above.  Collaboration and Referral of Nutrition care:  Patient discussed today during interdisciplinary bedside rounds.     Goals  Patient will meet % needs within the next 5-7 days     MONITORING AND  EVALUATION:  Progress towards goals will be monitored and evaluated per protocol and Practice Guidelines    Martha Sandoval RD, LD, CNSC   Clinical Dietitian - [facility]

## 2022-12-21 NOTE — PLAN OF CARE
Goal Outcome Evaluation:  Plan of Care Reviewed With: patient  Overall Patient Progress: improvingOverall Patient Progress: improving  Pt. Alert, disoriented to time and situation. Following commands. EEG  monitoring in progress .Trach in place with full support vent mode. Continues on Protonix drip. Abdominal pain managed with PRN Oxycodone x1: effective. Chest tube with minimal output. Rectal tube patent with small output.  Plan: Dialysis and paracentesis today.

## 2022-12-21 NOTE — PLAN OF CARE
Goal Outcome Evaluation:      Plan of Care Reviewed With: patient, spouse, sibling  Pt attached to EEG post seizure following cardiac arrest last shift.  Postponed paracentesis until Wednesday.  Bedside bronchoscopy completed, MRI completed.  Flexiseal placed for continuous liquid stools, held lactulose per order.  Arterial line placed in right groin for recent low blood pressure monitoring.  Blood pressure not requiring levophed at this time.  Spouse and brother at bedside, updated.

## 2022-12-21 NOTE — PROCEDURES
[facility]    Lumbar puncture    Date/Time: 12/21/2022 4:28 PM  Performed by: Maurice Benjamin MD  Authorized by: Maurice Benjamin MD   Indications: evaluation for infection and evaluation for altered mental status  Preparation: Patient was prepped and draped in the usual sterile fashion.      UNIVERSAL PROTOCOL   Site Marked: No  Prior Images Obtained and Reviewed:  No  Required items: Required blood products, implants, devices and special equipment available    Patient identity confirmed:  Verbally with patient  NA - No sedation, light sedation, or local anesthesia  Confirmation Checklist:  Patient's identity using two indicators  Time out: Immediately prior to the procedure a time out was called    Universal Protocol: the Joint Commission Universal Protocol was followed    Preparation: Patient was prepped and draped in usual sterile fashion       ANESTHESIA    Anesthesia: Local infiltration  Local Anesthetic:  Topical anesthetic      SEDATION    Patient Sedated: No      PROCEDURE DETAILS  Lumbar space: L4-L5 interspace  Patient's position: right lateral decubitus  Needle gauge: 20  Needle type: spinal needle - Quincke tip  Needle length: 3.5 in  Number of attempts: 2  Opening pressure: 19 cm H2O  Fluid appearance: clear  Tubes of fluid: 4  Total volume: 28 ml  Post-procedure: site cleaned      PROCEDURE    Patient Tolerance:  Patient tolerated the procedure well with no immediate complications  Length of time physician/provider present for 1:1 monitoring during sedation: 0   Well tolerated, 1cc blood lossComments: Well tolerated, 1cc blood loss

## 2022-12-21 NOTE — PROGRESS NOTES
"[facility]  [facility]  Gastroenterology Progress note    Interval History:  Pt with severe pain overnight- oxycodone resumed at lower dose  Continues on ventilator via trach   Rectal tube placed yesterday for liquid stools, no melena or hematochezia         Vital Signs:      /71   Pulse 65   Temp 97.5  F (36.4  C) (Oral)   Resp 16   Wt 93.8 kg (206 lb 12.7 oz)   SpO2 100%   BMI 28.05 kg/m    Temp (24hrs), Av.5  F (36.4  C), Min:96.9  F (36.1  C), Max:97.7  F (36.5  C)    Patient Vitals for the past 72 hrs:   Weight   22 0400 93.8 kg (206 lb 12.7 oz)       Intake/Output Summary (Last 24 hours) at 2022 0758  Last data filed at 2022 0644  Gross per 24 hour   Intake 2260 ml   Output 1306 ml   Net 954 ml         Constitutional: NAD, comfortable  Cardiovascular: RRR, normal S1, S2   Respiratory: ventilated via trach, synchronous with ventilator   Abdomen: Semi-firm, distended, PEG in place, + bowel sounds, + tenderness in lower abdomen and LUQNeuro: Alert, nods \"yes\" and \"no\" to questions    Additional Comments:  ROS, FH, SH: See initial GI consult for details.    Laboratory Data:  Recent Labs   Lab Test 22  0405 22  0426 22  0624 22  0519 22  2239 22  1620 22  1448 22  0513   WBC 4.4 4.3 4.1  --  3.9* 4.5   < > 8.0   HGB 8.1* 8.2* 8.2*   < > 7.9*  7.9* 7.0*   < > 7.8*   * 102* 102*  --  106* 107*   < > 113*   PLT 82* 84* 84*  --  94* 111*   < > 76*   INR  --   --   --   --  1.14 1.58*  --  1.16*    < > = values in this interval not displayed.     Recent Labs   Lab Test 22  0405 22  0426 22  0624   * 131* 131*   POTASSIUM 3.8 3.8 4.4   CHLORIDE 95 96 94   CO2 24 28 26   BUN 55* 50* 75*   CR 2.66* 2.22* 3.06*   ANIONGAP 11 7 11   KELLY 7.5* 7.6* 7.8*     Recent Labs   Lab Test 22  0405 22  0426 22  0624 22  2239 22  0626 22  0513 22  0439 " 11/28/22  0504 11/27/22  0447 11/26/22  1121 11/26/22  0403 11/25/22  1947 11/25/22  1859 07/10/18  0720 10/31/17  1038 09/25/16  0611 09/24/16  1055 09/23/16  0720 09/22/16  0741 09/21/16  0034 09/20/16  1259   ALBUMIN 1.9* 2.0* 2.1*  1.9* 1.7* 2.2*   < > 1.9*   < > 2.0*   < > 2.1*   < >  --    < >  --    < >  --    < > 2.8*   < > 3.8   BILITOTAL  --  1.8*  --  0.9 0.6   < > 0.9   < > 1.1  --  1.6*  --   --    < >  --    < >  --    < > 2.3*   < > 6.8*   DBIL  --   --   --   --   --   --  0.7*  --  0.8*  --  1.4*  --   --    < >  --    < >  --   --  1.3*   < >  --    ALT  --  19  --  20 16   < > 24   < > 29  --  37  --   --    < >  --    < >  --    < > 726*   < > 26   AST  --  20  --  21 22   < > 19   < > 33  --  39  --   --    < >  --    < >  --    < > 570*   < > 32   ALKPHOS  --  147  --  251* 253*   < > 189*   < > 177*  --  210*  --   --    < >  --    < >  --    < > 51   < > 130   PROTEIN  --   --   --   --   --   --   --   --   --   --   --   --  70*  --  Negative  --  Negative   < >  --   --   --    LIPASE  --   --   --   --   --   --   --   --   --   --   --   --   --   --   --   --   --   --  63*  --   --    AMYLASE  --   --   --   --   --   --   --   --   --   --   --   --   --   --   --   --   --   --  72  --  86    < > = values in this interval not displayed.         Assessment:  56 yo male s/p liver transplant for CARRILLO cirrhosis at [facility] in 2016, s/p trach/PEG placement following respiratory failure and PEA due to strep/parainfluenza pneumonia complicated by multi system organ failure requiring HD, initially discharged 12/14 and then noted to have bloody stool and right sided hemothorax.  He was transferred to [facility] on 12/16.  The patient had a bloody stool overnight on 12/17 but has not had any further bloody bowel movements since.  Suspected possible iatrogenic rectal ulcer. Hemoglobin stable.  It was determined that no further endoscopic evaluation was needed at this time.      Patient reporting  abdominal pain.  CT A/P without contrast 12/19 showed a moderate to large amount of ascites, increased from previous.  No bowel obstruction or significant stool burden seen.  Improved pleural effusions and associated compressive atelectasis and/or infiltrate.  Paracentesis recommended 12/19 but not completed due to patient receiving hemodialysis, again deferred 12/20, plans for this to be completed today as patient continues to endorse lower abdominal pain.   Patient on lactulose and rifaximin.  Lactulose currently held due to frequent, loose stools and rectal tube replaced 12/20.   Patient had an episode 12/20 with brief PEA arrest.  A brief episode of CPR was given with rapid return to rhythm and adequate oxygenation.  CXR showed stable bilateral pleural effusions.  No pneumothorax.  He then developed a grand mal seizure.  He received Ativan and Keppra and was placed on EEG monitoring. Per neurology, patient has had several small strokes and will require anticoagulation.     Plan:  -  Plan for paracentesis today if tolerates.  Fluid will be sent to rule out SBP. Supplemental 25% Albumin 6-8g/L removed if > 5L removed.   - Recommend resuming TF  - Stop Protonix gtt, transition to IV Protonix BID   -  Continue to monitor stool for signs of bleeding.    -  Monitor H/H; transfuse prn.  -  Immunosuppression per [facility] transplant team.  -  Xifaxan and Lactulose.  Titrate lactulose to 3 loose BM per day.  -  No plan for further endoscopic evaluation at this time.  - Anticoagulation ok from GI standpoint as no current evidence of GI bleeding    Discussed with Dr. Mccrary, GI Staff     Total Time Spent:  20 minutes    Kelly Hernandez PA-C  [facility]  Office:  [phone] call if needed after 5PM  Cell:  [phone], not available after 5PM at this number

## 2022-12-21 NOTE — PROGRESS NOTES
[facility]    Stroke Progress Note    Interval Events- NAEON  - In AM following dialysis had seizures as described below    HPI Summary  Blanco Osborne is a 56 YO M w/vascular RFs: afib with AC currently held in setting of hemothorax with chest tubes, CHRISTIAN on BiPAP and PMHx of liver failure 2/2 EtOH use s/p hepatic transplant on tacrolimus, s/p trach/PEG following recent hospitalization at OSH for AHRF 2/2 strep PNA c/b PEA and septic shock with multiorgan failure/injury.     He was readmitted to OSH for acute GIB and L hemothorax requiring chest tube at OSH. After blood transfusion he was transferred to [facility] on 12/16.      On 12/19 had brief PEA arrest (difficult to ventilate, hypoxic, bradycardic), felt 2/2 to primary pulmonary (mucous plugging). Following PEA (about 20 min) he had witnessed GTC lasting about 5 min and resolving with 2mg IV lorazepam. Keppra 2 gm load subsequently given.     12/21: Acute MS change with focal motor movements in mouth, electrographic correlate of x2 GTCs. Seizures happened following dialysis - possible that dialysis elimination of keppra contributed to seizures.     Stroke Evaluation Summarized    MRI/Head CT 1. Scattered tiny recent ischemic infarcts in the cerebral hemispheres  bilaterally consistent with embolic infarcts from a central embolic  source.  2. Diffuse cerebral volume loss and cerebral white matter changes  consistent with chronic small vessel ischemic disease.      Intracranial Vasculature NA   Cervical Vasculature NA     Echocardiogram Normal left ventricular wall thickness.  Left ventricular systolic function is normal.  The visual ejection fraction is estimated at 70-75%.  Left ventricular diastolic function is normal.  No regional wall motion abnormalities noted.  The right ventricle is normal in size and function.  A contrast injection (Bubble Study) was performed that was negative for flow  across the interatrial septum.  No  significant valve disease.  Right ventricular systolic pressure could not be approximated due to  inadequate tricuspid regurgitation.  The inferior vena cava is normal.   EKG/Telemetry Sinus rhythm   Other Testing Not Applicable      LDL  No lab value available in past 30 days   A1C  11/19/2022: 4.7 %   Troponin No lab value available in past 48 hrs       Impression/Plan  # New GTC in setting of critical illness with multiorgan failure/dysfunction/injury, PEA 2/2 acute pulmonary event.  # Multiple electro-clinical generalized seizures (focal mouth movements with clear change in mental status) on 12/21. These new breakthrough seizures were post dialysis. Pt is critically ill with renal failure requiring dialysis, pulmonary failure requiring MV, and likely exacerbated by new PEA event on 12/20 leading to cortical irritability. On exam he was much improved today prior to seizures - he follows commands, moving all extremities although moves LUE much less vs RUE.   - cEEG - will keep on given new seizures this morning   - Cont keppra 1 gm daily (renally dosed, dialysis dosed) - appreciate Pharmacy help  - 12/21: Initiated  mg load with 100 mg BID dosing for new bedtime seizures post dialysis   - Given recent herpatic oral lesions and concerns for CNS infectious trigger in immunocompromised patient, plan for diagnostic LP and agree with empiric IV acyclovir     # Multifocal small acute ischemic strokes. MR with small strokes in L temporal, R parietal, and L parietal lobes - suspicious for embolic etiology. Suspect 2/2 to cardioembolism from known afib, critically ill state. AC has been held for acute blood loss anemia, hemothorax. TTE reviewed above. A1c 4.7 on 11/19. LDL pending. Small strokes would not be expected to be primary factor in ongoing encephalopathy. Imaging also does not explain focal LUE weakness.   - MRA H/N non-urgently/when more stable to complete workup  - Resume PTA AC when GI gives him  "clearance and stable from hemothorax perspective for secondary stroke prevention (known afib previously on AC)  - If acute blood loss anemia is ongoing concern, can consider bridging with ASA 81 mg until he is stable enough for AC     Patient Follow-up    - final recommendation pending work-up    We will continue to follow.     The Stroke Staff is Dr. Shetty.    Maurice Benjamin MD  Vascular Neurology Fellow  To page me or covering stroke neurology team member, click here: AMCOM   Choose \"On Call\" tab at top, then search dropdown box for \"Neurology Adult\", select location, press Enter, then look for stroke/neuro ICU/telestroke.    ______________________________________________________    Clinically Significant Risk Factors         # Hyponatremia: Lowest Na = 130 mmol/L in last 2 days, will monitor as appropriate      # Hypoalbuminemia: Lowest albumin = 1.7 g/dL at 12/16/2022 10:39 PM, will monitor as appropriate   # Thrombocytopenia: Lowest platelets = 82 in last 2 days, will monitor for bleeding          # Severe Malnutrition: based on nutrition assessment          Medications   Scheduled Meds    - MEDICATION INSTRUCTIONS for Dialysis Patients -   Does not apply See Admin Instructions     sodium chloride (PF) 0.9%  10 mL Intracatheter During Dialysis/CRRT (from stock)     sodium chloride (PF) 0.9%  10 mL Intracatheter During Dialysis/CRRT (from stock)     acyclovir  200 mg Oral or Feeding Tube Q12H     carboxymethylcellulose PF  1 drop Both Eyes TID     chlorhexidine  15 mL Mouth/Throat Q12H     epoetin mirta-epbx  10,000 Units Subcutaneous Once per day on Mon Wed Fri     folic acid  1 mg Intravenous Daily     hydrocortisone sodium succinate PF  25 mg Intravenous Q6H     insulin aspart  1-6 Units Subcutaneous Q4H     [Auto Hold] insulin glargine  30 Units Subcutaneous QAM AC     ipratropium - albuterol 0.5 mg/2.5 mg/3 mL  3 mL Nebulization 4x daily     lacosamide (VIMPAT) intermittent infusion  100 mg Intravenous BID "     lacosamide (VIMPAT) intermittent infusion  200 mg Intravenous Once     lactulose  20 g Oral or Feeding Tube TID     levETIRAcetam  1,000 mg Intravenous Q24H     midodrine  10 mg Oral or Feeding Tube TID w/meals     multivitamins w/minerals  15 mL Per Feeding Tube Daily     nystatin  500,000 Units Swish & Swallow 4x Daily     pantoprazole  40 mg Intravenous BID 09 12     rifaximin  550 mg Per Feeding Tube BID     tacrolimus  1 mg Oral or Feeding Tube BID       Infusion Meds    norepinephrine Stopped (12/21/22 1037)     propofol Stopped (12/20/22 1719)     vasopressin Stopped (12/21/22 1147)       PRN Meds  sodium chloride 0.9%, albuterol, glucose **OR** dextrose **OR** glucagon, naloxone **OR** naloxone **OR** naloxone **OR** naloxone, ondansetron, oxyCODONE       PHYSICAL EXAMINATION  Temp:  [96.4  F (35.8  C)-97.5  F (36.4  C)] 96.8  F (36  C)  Pulse:  [] 77  Resp:  [8-53] 21  BP: ()/(51-71) 120/71  MAP:  [58 mmHg-120 mmHg] 84 mmHg  Arterial Line BP: ()/(44-91) 119/61  FiO2 (%):  [30 %-35 %] 30 %  SpO2:  [94 %-100 %] 100 %      Neuro Exam  MS: E2, trached, M6, follows most commands (opens mouth, squeezes hands)  CN: Pupils 3 mm and reactive bilat, does not BTT, grimace to pain is symmetric  Motor: Is not antigravity in any limb, but LUE appears weaker vs RUE, and BLE weaker than BUE  Sensory: Senses pain in all 4 limbs    Stroke Scales    NIHSS  1a. Level of Consciousness 0-->Alert, keenly responsive   1b. LOC Questions 2-->Answers neither question correctly   1c. LOC Commands 0-->Performs both tasks correctly   2.   Best Gaze 0-->Normal   3.   Visual 0-->No visual loss   4.   Facial Palsy 0-->Normal symmetrical movements   5a. Motor Arm, Left 3-->No effort against gravity, limb falls   5b. Motor Arm, Right 3-->No effort against gravity, limb falls   6a. Motor Leg, Left 3-->No effort against gravity, leg falls to bed immediately   6b. Motor Leg, right 3-->No effort against gravity, leg falls  to bed immediately   7.   Limb Ataxia 0-->Absent   8.   Sensory 0-->Normal, no sensory loss   9.   Best Language 1-->Mild-to-moderate aphasia, some obvious loss of fluency or facility of comprehension, without significant limitation on ideas expressed or form of expression. Reduction of speech and/or comprehension, however, makes conversation. . . (see row details)   10. Dysarthria (UN) Intubated or other physical barrier   11. Extinction and Inattention  0-->No abnormality   Total 15 (12/21/22 1301)       Modified Trousdale Score (Pre-morbid)  5 - Severe disability.  Requires constant nursing care and attention, bedridden.    Imaging  I personally reviewed all imaging; relevant findings per HPI.     Lab Results Data   CBC  Recent Labs   Lab 12/21/22 0405 12/20/22 0426 12/19/22  0624   WBC 4.4 4.3 4.1   RBC 2.63* 2.60* 2.55*   HGB 8.1* 8.2* 8.2*   HCT 26.6* 26.5* 26.0*   PLT 82* 84* 84*     Basic Metabolic Panel    Recent Labs   Lab 12/21/22  1150 12/21/22  0859 12/21/22  0406 12/21/22  0405 12/20/22  0432 12/20/22 0426 12/19/22  0940 12/19/22  0624   NA  --   --   --  130*  --  131*  --  131*   POTASSIUM  --   --   --  3.8  --  3.8  --  4.4   CHLORIDE  --   --   --  95  --  96  --  94   CO2  --   --   --  24  --  28  --  26   BUN  --   --   --  55*  --  50*  --  75*   CR  --   --   --  2.66*  --  2.22*  --  3.06*   * 107* 103* 120*   < > 129*   < > 133*   KELLY  --   --   --  7.5*  --  7.6*  --  7.8*    < > = values in this interval not displayed.     Liver Panel  Recent Labs   Lab 12/21/22  0405 12/20/22  0426 12/19/22  0624 12/16/22 2239   PROTTOTAL 4.7* 5.0*  --  5.1*   ALBUMIN 2.0*  1.9* 2.0* 2.1*  1.9* 1.7*   BILITOTAL 1.1 1.8*  --  0.9   ALKPHOS 133 147  --  251*   AST 20 20  --  21   ALT 17 19  --  20     INR    Recent Labs   Lab Test 12/16/22  2239 12/16/22  1620 12/06/22  0513   INR 1.14 1.58* 1.16*      Lipid Profile    Recent Labs   Lab Test 11/20/22  1151 04/20/20  1157 08/01/19  1500  01/08/19  0840 10/31/17  1037   CHOL 121 161  --   --  134   HDL 22* 42  --   --  57   LDL 80 81  --   --  58   TRIG 94 192* 177*   < > 92    < > = values in this interval not displayed.     A1C    Recent Labs   Lab Test 11/19/22  0212 09/22/16  0741   A1C 4.7 4.8     Troponin  No results for input(s): CTROPT, TROPONINIS, TROPONINI, GHTROP in the last 168 hours.       Data

## 2022-12-21 NOTE — PROGRESS NOTES
Brief Intensivist note  Patient with severe pain  Oxycodone resumed at lower dose (2.5 q 4 prn)  Watch neuro diligently    germain fitch  December 20, 2022

## 2022-12-21 NOTE — PROGRESS NOTES
Potassium   Date Value Ref Range Status   12/21/2022 3.8 3.4 - 5.3 mmol/L Final   04/20/2020 3.9 3.4 - 5.3 mmol/L Final     Hemoglobin   Date Value Ref Range Status   12/21/2022 8.1 (L) 13.3 - 17.7 g/dL Final   04/20/2020 14.3 13.3 - 17.7 g/dL Final     Creatinine   Date Value Ref Range Status   12/21/2022 2.66 (H) 0.66 - 1.25 mg/dL Final   04/20/2020 1.06 0.66 - 1.25 mg/dL Final     Urea Nitrogen   Date Value Ref Range Status   12/21/2022 55 (H) 7 - 30 mg/dL Final   04/20/2020 23 7 - 30 mg/dL Final     Sodium   Date Value Ref Range Status   12/21/2022 130 (L) 133 - 144 mmol/L Final   04/20/2020 139 133 - 144 mmol/L Final     INR   Date Value Ref Range Status   12/16/2022 1.14 0.85 - 1.15 Final   10/07/2019 1.20 (H) 0.86 - 1.14 Final      Latest Reference Range & Units Most Recent   Hep B Surface Agn Nonreactive  Nonreactive  12/16/22 22:39   Hepatitis B Surface Antibody Instrument Value <8.00 m[IU]/mL 0.00  12/10/22 20:22   Hepatitis B Surface Antibody  Nonreactive  12/10/22 20:22     DIALYSIS PROCEDURE NOTE  Hepatitis status of previous patient on machine log was checked and verified ok to use with this patients hepatitis status.  Patient dialyzed for 2 hrs. on a K3 bath with a net fluid removal of  0L.  A BFR of 350-400 ml/min was obtained via a CVC Tunelled. The treatment plan was discussed with Dr. Laboy during the treatment.    Total heparin received during the treatment: 0 units.     Line flushed, clamped and capped with heparin 1:1000 1.9 mL (1900 units) per lumen    Meds  Given: retacrit 10,000 units, albumin 5@ 250mL   Complications:     Decrease in blood pressures despite 150mL NS administered with UF paused. Increase in HR and BP post seizure activity prompted 150mL NS administration. Total 300mL NS administration with total of 300mL gain of fluid with 0 net L removed. Reported to MD, verbal order to discontinue treatment after two hours of treatment.     Person educated: person. Knowledge base  minimal. Barriers to learning: pt trached, lethargic. Educated on procedure via verbal mode. Patient verbalized understanding. Pt prefers oral education style.     ICEBOAT? Timeout performed pre-treatment  I: Patient was identified using 2 identifiers  C:  Consent Signed Yes  E: Equipment preventative maintenance is current and dialysis delivery system OK to use  B: Hepatitis B Surface Antigen: NONREACTIVE; Draw Date: 12/16/2022      Hepatitis B Surface Antibody: SUSCEPTIBLE; Draw Date: 12/16/2022E  O: Dialysis orders present and complete prior to treatment  A: Vascular access verified and assessed prior to treatment  T: Treatment was performed at a clinically appropriate time  ?: Patient was allowed to ask questions and address concerns prior to treatment  See Adult Hemodialysis flowsheet in [facility] for further details and post assessment.  Machine water alarm in place and functioning. Transducer pods intact and checked every 15min.   Pt dialyzed at ICU - 370  .  Chlorine/Chloramine water system checked every 4 hours.  Outpatient Dialysis at 1    Patient repositioned every 2 hours during the treatment.  Post treatment report given to DANIS Jane RN regarding 0L of fluid removed

## 2022-12-22 ENCOUNTER — APPOINTMENT (OUTPATIENT)
Dept: GENERAL RADIOLOGY | Facility: CLINIC | Age: 57
DRG: 207 | End: 2022-12-22
Attending: THORACIC SURGERY (CARDIOTHORACIC VASCULAR SURGERY)
Payer: COMMERCIAL

## 2022-12-22 ENCOUNTER — HOSPITAL ENCOUNTER (INPATIENT)
Dept: NEUROLOGY | Facility: CLINIC | Age: 57
Discharge: HOME | DRG: 207 | End: 2022-12-22
Attending: SURGERY | Admitting: INTERNAL MEDICINE
Payer: COMMERCIAL

## 2022-12-22 ENCOUNTER — APPOINTMENT (OUTPATIENT)
Dept: ULTRASOUND IMAGING | Facility: CLINIC | Age: 57
DRG: 207 | End: 2022-12-22
Attending: SURGERY
Payer: COMMERCIAL

## 2022-12-22 LAB
% LINING CELLS, BODY FLUID: 8 %
ABSOLUTE NEUTROPHILS, BODY FLUID: NORMAL
ALBUMIN BODY FLUID SOURCE: NORMAL
ALBUMIN FLD-MCNC: 0.3 G/DL
ALBUMIN SERPL-MCNC: 2.1 G/DL (ref 3.4–5)
ALBUMIN SERPL-MCNC: 2.1 G/DL (ref 3.4–5)
ALP SERPL-CCNC: 163 U/L (ref 40–150)
ALT SERPL W P-5'-P-CCNC: 17 U/L (ref 0–70)
AMYLASE BODY FLUID SOURCE: NORMAL
AMYLASE FLD-CCNC: 18 U/L
ANION GAP SERPL CALCULATED.3IONS-SCNC: 8 MMOL/L (ref 3–14)
APPEARANCE FLD: ABNORMAL
AST SERPL W P-5'-P-CCNC: 16 U/L (ref 0–45)
BASOPHILS # BLD AUTO: 0 10E3/UL (ref 0–0.2)
BASOPHILS NFR BLD AUTO: 0 %
BILIRUB SERPL-MCNC: 0.8 MG/DL (ref 0.2–1.3)
BUN SERPL-MCNC: 44 MG/DL (ref 7–30)
C GATTII+NEOFOR DNA CSF QL NAA+NON-PROBE: NEGATIVE
CALCIUM SERPL-MCNC: 7.6 MG/DL (ref 8.5–10.1)
CELL COUNT BODY FLUID SOURCE: ABNORMAL
CHLORIDE BLD-SCNC: 99 MMOL/L (ref 94–109)
CMV DNA CSF QL NAA+NON-PROBE: NEGATIVE
CO2 SERPL-SCNC: 26 MMOL/L (ref 20–32)
COLOR FLD: YELLOW
CREAT SERPL-MCNC: 2.45 MG/DL (ref 0.66–1.25)
E COLI K1 AG CSF QL: NEGATIVE
EOSINOPHIL # BLD AUTO: 0 10E3/UL (ref 0–0.7)
EOSINOPHIL NFR BLD AUTO: 0 %
ERYTHROCYTE [DISTWIDTH] IN BLOOD BY AUTOMATED COUNT: 18.2 % (ref 10–15)
EV RNA SPEC QL NAA+PROBE: NEGATIVE
GFR SERPL CREATININE-BSD FRML MDRD: 30 ML/MIN/1.73M2
GLUCOSE BLD-MCNC: 193 MG/DL (ref 70–99)
GLUCOSE BLDC GLUCOMTR-MCNC: 120 MG/DL (ref 70–99)
GLUCOSE BLDC GLUCOMTR-MCNC: 133 MG/DL (ref 70–99)
GLUCOSE BLDC GLUCOMTR-MCNC: 158 MG/DL (ref 70–99)
GLUCOSE BLDC GLUCOMTR-MCNC: 160 MG/DL (ref 70–99)
GLUCOSE BLDC GLUCOMTR-MCNC: 192 MG/DL (ref 70–99)
GLUCOSE BODY FLUID SOURCE: NORMAL
GLUCOSE FLD-MCNC: 160 MG/DL
GP B STREP DNA CSF QL NAA+NON-PROBE: NEGATIVE
GRAM STAIN RESULT: NORMAL
GRAM STAIN RESULT: NORMAL
HAEM INFLU DNA CSF QL NAA+NON-PROBE: NEGATIVE
HCT VFR BLD AUTO: 26.5 % (ref 40–53)
HGB BLD-MCNC: 8.5 G/DL (ref 13.3–17.7)
HHV6 DNA CSF QL NAA+NON-PROBE: POSITIVE
HSV1 DNA CSF QL NAA+NON-PROBE: NEGATIVE
HSV1 DNA CSF QL NAA+PROBE: NOT DETECTED
HSV2 DNA CSF QL NAA+NON-PROBE: NEGATIVE
HSV2 DNA CSF QL NAA+PROBE: NOT DETECTED
IMM GRANULOCYTES # BLD: 0 10E3/UL
IMM GRANULOCYTES NFR BLD: 1 %
L MONOCYTOG DNA CSF QL NAA+NON-PROBE: NEGATIVE
LD BODY BODY FLUID SOURCE: NORMAL
LDH FLD L TO P-CCNC: 56 U/L
LYMPHOCYTES # BLD AUTO: 0.5 10E3/UL (ref 0.8–5.3)
LYMPHOCYTES NFR BLD AUTO: 9 %
LYMPHOCYTES NFR FLD MANUAL: 18 %
Lab: NORMAL
MCH RBC QN AUTO: 31.8 PG (ref 26.5–33)
MCHC RBC AUTO-ENTMCNC: 32.1 G/DL (ref 31.5–36.5)
MCV RBC AUTO: 99 FL (ref 78–100)
MONOCYTES # BLD AUTO: 0.3 10E3/UL (ref 0–1.3)
MONOCYTES NFR BLD AUTO: 5 %
MONOS+MACROS NFR FLD MANUAL: 33 %
N MEN DNA CSF QL NAA+NON-PROBE: NEGATIVE
NEUTROPHILS # BLD AUTO: 4.7 10E3/UL (ref 1.6–8.3)
NEUTROPHILS NFR BLD AUTO: 85 %
NEUTS BAND NFR FLD MANUAL: 41 %
NRBC # BLD AUTO: 0 10E3/UL
NRBC BLD AUTO-RTO: 0 /100
PARECHOVIRUS A RNA CSF QL NAA+NON-PROBE: NEGATIVE
PATH REPORT.COMMENTS IMP SPEC: NORMAL
PATH REPORT.FINAL DX SPEC: NORMAL
PATH REPORT.GROSS SPEC: NORMAL
PATH REPORT.MICROSCOPIC SPEC OTHER STN: NORMAL
PATH REPORT.RELEVANT HX SPEC: NORMAL
PERFORMING LABORATORY: NORMAL
PHOSPHATE SERPL-MCNC: 4.4 MG/DL (ref 2.5–4.5)
PLATELET # BLD AUTO: 72 10E3/UL (ref 150–450)
POTASSIUM BLD-SCNC: 3.3 MMOL/L (ref 3.4–5.3)
PROT FLD-MCNC: 0.9 G/DL
PROT SERPL-MCNC: 4.9 G/DL (ref 6.8–8.8)
PROTEIN BODY FLUID SOURCE: NORMAL
RBC # BLD AUTO: 2.67 10E6/UL (ref 4.4–5.9)
S PNEUM DNA CSF QL NAA+NON-PROBE: NEGATIVE
SODIUM SERPL-SCNC: 133 MMOL/L (ref 133–144)
SPECIMEN STATUS: NORMAL
TACROLIMUS BLD-MCNC: 4.8 UG/L (ref 5–15)
TEST NAME: NORMAL
TME LAST DOSE: ABNORMAL H
TME LAST DOSE: ABNORMAL H
VZV DNA CSF QL NAA+NON-PROBE: NEGATIVE
WBC # BLD AUTO: 5.5 10E3/UL (ref 4–11)
WBC # FLD AUTO: 94 /UL

## 2022-12-22 PROCEDURE — 258N000003 HC RX IP 258 OP 636: Performed by: SURGERY

## 2022-12-22 PROCEDURE — 94640 AIRWAY INHALATION TREATMENT: CPT | Mod: 76

## 2022-12-22 PROCEDURE — 87070 CULTURE OTHR SPECIMN AEROBIC: CPT | Performed by: INTERNAL MEDICINE

## 2022-12-22 PROCEDURE — 87116 MYCOBACTERIA CULTURE: CPT | Performed by: INTERNAL MEDICINE

## 2022-12-22 PROCEDURE — 82150 ASSAY OF AMYLASE: CPT | Performed by: INTERNAL MEDICINE

## 2022-12-22 PROCEDURE — 95720 EEG PHY/QHP EA INCR W/VEEG: CPT | Performed by: PSYCHIATRY & NEUROLOGY

## 2022-12-22 PROCEDURE — 87077 CULTURE AEROBIC IDENTIFY: CPT | Performed by: INTERNAL MEDICINE

## 2022-12-22 PROCEDURE — 200N000001 HC R&B ICU

## 2022-12-22 PROCEDURE — 83615 LACTATE (LD) (LDH) ENZYME: CPT | Performed by: INTERNAL MEDICINE

## 2022-12-22 PROCEDURE — 82945 GLUCOSE OTHER FLUID: CPT | Performed by: INTERNAL MEDICINE

## 2022-12-22 PROCEDURE — 250N000009 HC RX 250: Performed by: RADIOLOGY

## 2022-12-22 PROCEDURE — 87205 SMEAR GRAM STAIN: CPT | Performed by: INTERNAL MEDICINE

## 2022-12-22 PROCEDURE — 84100 ASSAY OF PHOSPHORUS: CPT | Performed by: STUDENT IN AN ORGANIZED HEALTH CARE EDUCATION/TRAINING PROGRAM

## 2022-12-22 PROCEDURE — 71045 X-RAY EXAM CHEST 1 VIEW: CPT

## 2022-12-22 PROCEDURE — 94003 VENT MGMT INPAT SUBQ DAY: CPT

## 2022-12-22 PROCEDURE — G0463 HOSPITAL OUTPT CLINIC VISIT: HCPCS

## 2022-12-22 PROCEDURE — 87206 SMEAR FLUORESCENT/ACID STAI: CPT | Performed by: INTERNAL MEDICINE

## 2022-12-22 PROCEDURE — C9113 INJ PANTOPRAZOLE SODIUM, VIA: HCPCS | Performed by: PHYSICIAN ASSISTANT

## 2022-12-22 PROCEDURE — 82042 OTHER SOURCE ALBUMIN QUAN EA: CPT | Performed by: INTERNAL MEDICINE

## 2022-12-22 PROCEDURE — 250N000011 HC RX IP 250 OP 636: Performed by: PHYSICIAN ASSISTANT

## 2022-12-22 PROCEDURE — 99291 CRITICAL CARE FIRST HOUR: CPT | Performed by: INTERNAL MEDICINE

## 2022-12-22 PROCEDURE — 95714 VEEG EA 12-26 HR UNMNTR: CPT

## 2022-12-22 PROCEDURE — 99291 CRITICAL CARE FIRST HOUR: CPT | Mod: GC | Performed by: PSYCHIATRY & NEUROLOGY

## 2022-12-22 PROCEDURE — 250N000011 HC RX IP 250 OP 636: Performed by: INTERNAL MEDICINE

## 2022-12-22 PROCEDURE — 84157 ASSAY OF PROTEIN OTHER: CPT | Performed by: INTERNAL MEDICINE

## 2022-12-22 PROCEDURE — 88305 TISSUE EXAM BY PATHOLOGIST: CPT | Mod: TC | Performed by: INTERNAL MEDICINE

## 2022-12-22 PROCEDURE — 250N000013 HC RX MED GY IP 250 OP 250 PS 637: Performed by: SURGERY

## 2022-12-22 PROCEDURE — 87533 HHV-6 DNA QUANT: CPT | Performed by: INTERNAL MEDICINE

## 2022-12-22 PROCEDURE — 250N000009 HC RX 250: Performed by: PHYSICIAN ASSISTANT

## 2022-12-22 PROCEDURE — 80053 COMPREHEN METABOLIC PANEL: CPT | Performed by: INTERNAL MEDICINE

## 2022-12-22 PROCEDURE — 999N000157 HC STATISTIC RCP TIME EA 10 MIN

## 2022-12-22 PROCEDURE — 99232 SBSQ HOSP IP/OBS MODERATE 35: CPT | Performed by: STUDENT IN AN ORGANIZED HEALTH CARE EDUCATION/TRAINING PROGRAM

## 2022-12-22 PROCEDURE — 250N000011 HC RX IP 250 OP 636: Performed by: SURGERY

## 2022-12-22 PROCEDURE — 88112 CYTOPATH CELL ENHANCE TECH: CPT | Mod: TC | Performed by: INTERNAL MEDICINE

## 2022-12-22 PROCEDURE — 258N000003 HC RX IP 258 OP 636: Performed by: PSYCHIATRY & NEUROLOGY

## 2022-12-22 PROCEDURE — 88108 CYTOPATH CONCENTRATE TECH: CPT | Mod: 26 | Performed by: PATHOLOGY

## 2022-12-22 PROCEDURE — C9254 INJECTION, LACOSAMIDE: HCPCS | Performed by: PSYCHIATRY & NEUROLOGY

## 2022-12-22 PROCEDURE — 250N000012 HC RX MED GY IP 250 OP 636 PS 637: Performed by: INTERNAL MEDICINE

## 2022-12-22 PROCEDURE — 250N000013 HC RX MED GY IP 250 OP 250 PS 637: Performed by: PHYSICIAN ASSISTANT

## 2022-12-22 PROCEDURE — 85048 AUTOMATED LEUKOCYTE COUNT: CPT | Performed by: INTERNAL MEDICINE

## 2022-12-22 PROCEDURE — 250N000009 HC RX 250: Performed by: INTERNAL MEDICINE

## 2022-12-22 PROCEDURE — 272N000706 US PARACENTESIS PORTABLE

## 2022-12-22 PROCEDURE — 250N000013 HC RX MED GY IP 250 OP 250 PS 637: Performed by: INTERNAL MEDICINE

## 2022-12-22 PROCEDURE — 89051 BODY FLUID CELL COUNT: CPT | Performed by: INTERNAL MEDICINE

## 2022-12-22 PROCEDURE — 250N000011 HC RX IP 250 OP 636: Performed by: PSYCHIATRY & NEUROLOGY

## 2022-12-22 PROCEDURE — 94640 AIRWAY INHALATION TREATMENT: CPT

## 2022-12-22 RX ORDER — GINSENG 100 MG
CAPSULE ORAL ONCE
Status: COMPLETED | OUTPATIENT
Start: 2022-12-22 | End: 2022-12-22

## 2022-12-22 RX ORDER — LIDOCAINE HYDROCHLORIDE 10 MG/ML
10 INJECTION, SOLUTION EPIDURAL; INFILTRATION; INTRACAUDAL; PERINEURAL ONCE
Status: COMPLETED | OUTPATIENT
Start: 2022-12-22 | End: 2022-12-22

## 2022-12-22 RX ADMIN — LACTULOSE 20 G: 20 SOLUTION ORAL at 08:38

## 2022-12-22 RX ADMIN — Medication 1 DROP: at 16:14

## 2022-12-22 RX ADMIN — CHLORHEXIDINE GLUCONATE 0.12% ORAL RINSE 15 ML: 1.2 LIQUID ORAL at 08:37

## 2022-12-22 RX ADMIN — IPRATROPIUM BROMIDE AND ALBUTEROL SULFATE 3 ML: .5; 3 SOLUTION RESPIRATORY (INHALATION) at 15:09

## 2022-12-22 RX ADMIN — BACITRACIN: 500 OINTMENT TOPICAL at 13:57

## 2022-12-22 RX ADMIN — HYDROCORTISONE SODIUM SUCCINATE 25 MG: 100 INJECTION, POWDER, FOR SOLUTION INTRAMUSCULAR; INTRAVENOUS at 08:37

## 2022-12-22 RX ADMIN — HYDROCORTISONE SODIUM SUCCINATE 25 MG: 100 INJECTION, POWDER, FOR SOLUTION INTRAMUSCULAR; INTRAVENOUS at 13:57

## 2022-12-22 RX ADMIN — NYSTATIN 500000 UNITS: 100000 SUSPENSION ORAL at 17:28

## 2022-12-22 RX ADMIN — IPRATROPIUM BROMIDE AND ALBUTEROL SULFATE 3 ML: .5; 3 SOLUTION RESPIRATORY (INHALATION) at 11:08

## 2022-12-22 RX ADMIN — HYDROCORTISONE SODIUM SUCCINATE 25 MG: 100 INJECTION, POWDER, FOR SOLUTION INTRAMUSCULAR; INTRAVENOUS at 20:42

## 2022-12-22 RX ADMIN — INSULIN ASPART 2 UNITS: 100 INJECTION, SOLUTION INTRAVENOUS; SUBCUTANEOUS at 20:39

## 2022-12-22 RX ADMIN — MIDODRINE HYDROCHLORIDE 10 MG: 5 TABLET ORAL at 13:54

## 2022-12-22 RX ADMIN — NYSTATIN 500000 UNITS: 100000 SUSPENSION ORAL at 23:01

## 2022-12-22 RX ADMIN — LACTULOSE 20 G: 20 SOLUTION ORAL at 16:14

## 2022-12-22 RX ADMIN — PANTOPRAZOLE SODIUM 40 MG: 40 INJECTION, POWDER, FOR SOLUTION INTRAVENOUS at 08:38

## 2022-12-22 RX ADMIN — INSULIN ASPART 1 UNITS: 100 INJECTION, SOLUTION INTRAVENOUS; SUBCUTANEOUS at 16:13

## 2022-12-22 RX ADMIN — RIFAXIMIN 550 MG: 550 TABLET ORAL at 20:41

## 2022-12-22 RX ADMIN — SODIUM CHLORIDE 100 MG: 9 INJECTION, SOLUTION INTRAVENOUS at 08:43

## 2022-12-22 RX ADMIN — NYSTATIN 500000 UNITS: 100000 SUSPENSION ORAL at 13:55

## 2022-12-22 RX ADMIN — LACTULOSE 20 G: 20 SOLUTION ORAL at 22:56

## 2022-12-22 RX ADMIN — FOLIC ACID 1 MG: 5 INJECTION, SOLUTION INTRAMUSCULAR; INTRAVENOUS; SUBCUTANEOUS at 08:41

## 2022-12-22 RX ADMIN — NYSTATIN 500000 UNITS: 100000 SUSPENSION ORAL at 08:38

## 2022-12-22 RX ADMIN — TACROLIMUS 1 MG: 5 CAPSULE ORAL at 20:42

## 2022-12-22 RX ADMIN — PANTOPRAZOLE SODIUM 40 MG: 40 INJECTION, POWDER, FOR SOLUTION INTRAVENOUS at 13:54

## 2022-12-22 RX ADMIN — TACROLIMUS 1 MG: 5 CAPSULE ORAL at 08:46

## 2022-12-22 RX ADMIN — RIFAXIMIN 550 MG: 550 TABLET ORAL at 08:38

## 2022-12-22 RX ADMIN — INSULIN ASPART 1 UNITS: 100 INJECTION, SOLUTION INTRAVENOUS; SUBCUTANEOUS at 13:56

## 2022-12-22 RX ADMIN — MIDODRINE HYDROCHLORIDE 10 MG: 5 TABLET ORAL at 08:38

## 2022-12-22 RX ADMIN — Medication 5 ML: at 08:46

## 2022-12-22 RX ADMIN — IPRATROPIUM BROMIDE AND ALBUTEROL SULFATE 3 ML: .5; 3 SOLUTION RESPIRATORY (INHALATION) at 07:34

## 2022-12-22 RX ADMIN — MIDODRINE HYDROCHLORIDE 10 MG: 5 TABLET ORAL at 17:28

## 2022-12-22 RX ADMIN — INSULIN ASPART 1 UNITS: 100 INJECTION, SOLUTION INTRAVENOUS; SUBCUTANEOUS at 09:19

## 2022-12-22 RX ADMIN — Medication 1 DROP: at 08:38

## 2022-12-22 RX ADMIN — LEVETIRACETAM 1000 MG: 10 INJECTION INTRAVENOUS at 13:47

## 2022-12-22 RX ADMIN — MULTIVITAMIN 15 ML: LIQUID ORAL at 08:38

## 2022-12-22 RX ADMIN — LIDOCAINE HYDROCHLORIDE 10 ML: 10 INJECTION, SOLUTION EPIDURAL; INFILTRATION; INTRACAUDAL; PERINEURAL at 13:04

## 2022-12-22 RX ADMIN — SODIUM CHLORIDE 100 MG: 9 INJECTION, SOLUTION INTRAVENOUS at 21:27

## 2022-12-22 RX ADMIN — HYDROCORTISONE SODIUM SUCCINATE 25 MG: 100 INJECTION, POWDER, FOR SOLUTION INTRAMUSCULAR; INTRAVENOUS at 02:10

## 2022-12-22 RX ADMIN — GANCICLOVIR SODIUM 120 MG: 500 INJECTION, POWDER, LYOPHILIZED, FOR SOLUTION INTRAVENOUS at 10:04

## 2022-12-22 RX ADMIN — CHLORHEXIDINE GLUCONATE 0.12% ORAL RINSE 15 ML: 1.2 LIQUID ORAL at 20:40

## 2022-12-22 RX ADMIN — IPRATROPIUM BROMIDE AND ALBUTEROL SULFATE 3 ML: .5; 3 SOLUTION RESPIRATORY (INHALATION) at 19:20

## 2022-12-22 RX ADMIN — Medication 1 DROP: at 22:55

## 2022-12-22 RX ADMIN — OXYCODONE HYDROCHLORIDE 2.5 MG: 5 TABLET ORAL at 16:28

## 2022-12-22 ASSESSMENT — ACTIVITIES OF DAILY LIVING (ADL)
ADLS_ACUITY_SCORE: 47
ADLS_ACUITY_SCORE: 51
ADLS_ACUITY_SCORE: 47
ADLS_ACUITY_SCORE: 51

## 2022-12-22 NOTE — PROGRESS NOTES
[facility]  WO Nurse Inpatient Assessment     Consulted for: Peg tube, trach, nose    Patient History (according to provider note(s):      Blanco Osborne is a 57 year old male with paroxysmal A. fib, Liver transplant (2016), CHRISTIAN on BiPAP, h/o CVA and hypertension. He is s/p Trach/PEG following acute resp failure and PEA due to Strep/para influenza pna. He had septic shock with MSOF and is currently on iHD. He had Rt sided Chest tube placed for hydroptx on 12/12/22. He was transferred to [facility] on 12/14/22 for CIP. Was noted to have bloody stool on 12/15 and 12/16 and had left Ptx which required left sided chest tube leading to ~900ml of bloody output.   He was given 2uPRBC's and [facility].    Areas Assessed:      Areas visualized during today's visit: Peg tube, trach, nose    Wound location: Trach Site    Last photo: 12/22/22        Wound due to: Moisture Associated Skin Damage (MASD) and Surgical Wound  Wound history/plan of care: Appears to have some splitting at the suture site as well as copious amounts of mucous output  Wound base: 100 % moist pink tissue that is open part of trach on left side     Palpation of the wound bed: normal      Drainage: none     Description of drainage: none and UTD     Measurements (length x width x depth, in cm): Extends approximately 1cm from trach at 3 o clock enlarging site.     Tunneling: N/A     Undermining: N/A  Periwound skin: Ecchymosis      Color: purple      Temperature: normal   Odor: none  Pain: related to moving trach only, none  Pain interventions prior to dressing change: patient tolerated well  Treatment goal: Heal   STATUS: improving  Supplies ordered: supplies stored on unit     Wound location: PEG Site    Last photo: 12/22/22          Wound due to: Moisture Associated Skin Damage (MASD)  Wound history/plan of care: Patient with newer PEG tube, wound first identified at [facility] with [facility] assessment. 3 scabbed areas align  where this writer had previously seen sutures in place on previous [facility] ICU admission. Site around tube seems less likely to be pressure as there is nothing on the bumper to touch here. Suspect poor healing of surgical site and erosion from PEG site drainage, ascites likely contributing   Wound base: Pink yellow moist tissue  From 6-8  o clock approximately 0.5cm with a depth of 0.2cm from ostomy. One of the suture scabs removed to reveal a 0.2cm x 0.2cm x 0.2cm pale pink moist tissue. Other 2 scab sites have resolved.     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 Cleanse with saline, pat dry.  2. Apply Critic-Aid barrier cream over wound.  3. Cover with Mepilex 4x4 cut like split gauze around tube.  4. Ensure that tubing is stablized with securement device.    Trach site: Q8H and PRN with drainage.  1. Cleanse with saline pat dry.  2. Cover with Aquacel [employer] ([id number]) cut  like split gauze.      Orders: Reviewed and Updated    RECOMMEND PRIMARY TEAM ORDER: None, at this time  Education provided: importance of repositioning, plan of care, wound progress, Moisture management and Off-loading pressure  Discussed plan of care with: Patient, Family and Nurse  WOC nurse follow-up plan: weekly  Notify WOC if wound(s) deteriorate.  Nursing to notify the Provider(s) and re-consult the WOC Nurse if new skin concern.    DATA:     Current support surface: Standard  Low air loss (MIQUEL pump, Isolibrium, Pulsate, skin guard, etc)  Containment of urine/stool: Incontinent pad in bed  BMI: Body mass index is 28.64 kg/m .   Active diet order: Orders Placed This Encounter      NPO for Medical/Clinical Reasons Except for: Ice Chips, Meds     Output: I/O last 3 completed shifts:  In: 665 [NG/GT:435]  Out: 14 [Chest Tube:14]     Labs:   Recent Labs   Lab 12/21/22  1315 12/21/22  0405   ALBUMIN  --  2.0*  1.9*   HGB 8.0* 8.1*   INR 1.36*  --    WBC 3.4* 4.4     Pressure injury risk assessment:   Sensory Perception: 2-->very limited  Moisture: 3-->occasionally moist  Activity: 1-->bedfast  Mobility: 2-->very limited  Nutrition: 2-->probably inadequate  Friction and Shear: 2-->potential problem  Kareem Score: 12    Ashleigh CASTANEDA   Dept. Pager: [phone]  Dept. Office Number: [phone]

## 2022-12-22 NOTE — PROGRESS NOTES
[facility] ICU RESPIRATORY NOTE        Date of Admission: 12/16/2022    Date of Intubation (most recent): Chronic trach    Reason for Mechanical Ventilation: Respiratory failure    Number of Days on Mechanical Ventilation: 7    Met Criteria for Spontaneous Breathing Trial: No    Reason for No Spontaneous Breathing Trial: Per MD    Significant Events Today: None overnight.    ABG Results:   Recent Labs   Lab 12/20/22  0719 12/17/22  1136   PH 7.43  --    PCO2 40  --    PO2 118*  --    HCO3 27  --    O2PER 30 30       Current Vent Settings: Vent Mode: CMV/AC  (Continuous Mandatory Ventilation/ Assist Control)  FiO2 (%): 30 %  Resp Rate (Set): 16 breaths/min  Tidal Volume (Set, mL): 450 mL  PEEP (cm H2O): 5 cmH2O  Resp: 17        Jayson Marie, RT on 12/22/2022 at 6:15 AM

## 2022-12-22 NOTE — PROGRESS NOTES
"[facility]  [facility]  Gastroenterology Progress note    Interval History:  Rectal tube in place for multiple watery stools   2 seizures yesterday with tachycardia, hypertension during dialysis- CSF positive for Human Herpes 6- started on Ganciclovir  Denies abdominal pain        Vital Signs:      /71   Pulse 75   Temp 97.3  F (36.3  C) (Oral)   Resp 17   Wt 95.8 kg (211 lb 3.2 oz)   SpO2 100%   BMI 28.64 kg/m    Temp (24hrs), Av.5  F (36.4  C), Min:96.9  F (36.1  C), Max:97.7  F (36.5  C)    Patient Vitals for the past 72 hrs:   Weight   22 0500 95.8 kg (211 lb 3.2 oz)   22 0400 93.8 kg (206 lb 12.7 oz)       Intake/Output Summary (Last 24 hours) at 2022 0758  Last data filed at 2022 0644  Gross per 24 hour   Intake 2260 ml   Output 1306 ml   Net 954 ml         Constitutional: NAD, comfortable  Cardiovascular: RRR, normal S1, S2   Respiratory: ventilated via trach, synchronous with ventilator   Abdomen: Semi-firm, distended, PEG in place, + bowel sounds, non-tender  Neuro: Alert, nods \"yes\" and \"no\" to questions    Additional Comments:  ROS, FH, SH: See initial GI consult for details.    Laboratory Data:  Recent Labs   Lab Test 22  1315 22  0405 22  0426 22  0519 22  2239 22  1620   WBC 3.4* 4.4 4.3   < > 3.9* 4.5   HGB 8.0* 8.1* 8.2*   < > 7.9*  7.9* 7.0*   * 101* 102*   < > 106* 107*   PLT 75* 82* 84*   < > 94* 111*   INR 1.36*  --   --   --  1.14 1.58*    < > = values in this interval not displayed.     Recent Labs   Lab Test 22  0405 22  0426 22  0624   * 131* 131*   POTASSIUM 3.8 3.8 4.4   CHLORIDE 95 96 94   CO2 24 28 26   BUN 55* 50* 75*   CR 2.66* 2.22* 3.06*   ANIONGAP 11 7 11   KELLY 7.5* 7.6* 7.8*     Recent Labs   Lab Test 22  0405 22  0426 22  0624 22  2239 22  0513 22  0439 22  0504 22  0447 22  1947 " 11/25/22  1859 07/10/18  0720 10/31/17  1038 09/25/16  0611 09/24/16  1055 09/23/16  0720 09/22/16  0741 09/21/16  0034 09/20/16  1259   ALBUMIN 2.0*  1.9* 2.0* 2.1*  1.9* 1.7*   < > 1.9*   < > 2.0*   < >  --    < >  --    < >  --    < > 2.8*   < > 3.8   BILITOTAL 1.1 1.8*  --  0.9   < > 0.9   < > 1.1   < >  --    < >  --    < >  --    < > 2.3*   < > 6.8*   DBIL 0.6*  --   --   --   --  0.7*  --  0.8*   < >  --    < >  --    < >  --   --  1.3*   < >  --    ALT 17 19  --  20   < > 24   < > 29   < >  --    < >  --    < >  --    < > 726*   < > 26   AST 20 20  --  21   < > 19   < > 33   < >  --    < >  --    < >  --    < > 570*   < > 32   ALKPHOS 133 147  --  251*   < > 189*   < > 177*   < >  --    < >  --    < >  --    < > 51   < > 130   PROTEIN  --   --   --   --   --   --   --   --   --  70*  --  Negative  --  Negative   < >  --   --   --    LIPASE  --   --   --   --   --   --   --   --   --   --   --   --   --   --   --  63*  --   --    AMYLASE  --   --   --   --   --   --   --   --   --   --   --   --   --   --   --  72  --  86    < > = values in this interval not displayed.         Assessment:  58 yo male s/p liver transplant for CARRILLO cirrhosis at [facility] in 2016, s/p trach/PEG placement following respiratory failure and PEA due to strep/parainfluenza pneumonia complicated by multi system organ failure requiring HD, initially discharged 12/14 and then noted to have bloody stool and right sided hemothorax.  He was transferred to [facility] on 12/16.  The patient had a bloody stool overnight on 12/17 but has not had any further bloody bowel movements since.  Suspected possible iatrogenic rectal ulcer. Hemoglobin has remained stable with no further evidence of GI bleeding- subsequently no indication for endoscopic evaluation.   Patient reported  abdominal pain 12/19.  CT A/P without contrast 12/19 showed a moderate to large amount of ascites, increased from previous.  No bowel obstruction or significant stool burden seen.   Improved pleural effusions and associated compressive atelectasis and/or infiltrate.  Paracentesis recommended 12/19 but not completed due to patient receiving hemodialysis, again deferred 12/20 and 12/21  plans for this to be completed today. However, pt no longer endorsing abdominal pain and non-tender on examination.   Patient on lactulose and rifaximin.  Has been having loose stools since lactulose resumed 12/19 with rectal tube replaced. Pt has a PEG in place, TF resumed 12/21.   Patient had an episode 12/20 with brief PEA arrest.  A brief episode of CPR was given with rapid return to rhythm and adequate oxygenation.  CXR showed stable bilateral pleural effusions.  No pneumothorax.  He then developed a grand mal seizure.  He received Ativan and Keppra and was placed on EEG monitoring. MRI revealed multifocal small acute ischemic strokes, but not thought to explain his focal LUE weakness with MRA recommended when pt more stable.  On 12/21 he again had 2 seizures during HD, subsequently underwent LP with CSF positive for HHV 6 an pt was started on Ganciclovir.         Plan:  -  Plan for paracentesis today if tolerates.  Fluid will be sent to rule out SBP. Supplemental 25% Albumin 6-8g/L removed if > 5L removed.   - Continue TF  - Continue Protonix IV BID   -  Continue to monitor stool for signs of bleeding.    -  Monitor H/H; transfuse prn.  -  Immunosuppression per [facility] transplant team.  -  Xifaxan and Lactulose.  Titrate lactulose to 3 loose BM per day.  -  No plan for further endoscopic evaluation at this time.  - Anticoagulation ok from GI standpoint as no current evidence of GI bleeding- defer to ICU team regarding this     Discussed with Dr. Mccrary, GI Staff     Total Time Spent:  20 minutes    Kelly Hernandez PA-C  [facility]  Office:  [phone] call if needed after 5PM  Cell:  [phone], not available after 5PM at this number

## 2022-12-22 NOTE — PROGRESS NOTES
[facility] Intensive Care Unit  History and Physical  December 21, 2022      Blanco Osborne MRN# [id number]   Age: 57 year old YOB: 1965     Date of Admission: 12/16/2022     Problem List:  1. Acute respiratory failure/PEA arrest X 2   A) presumed mucus plug instigating above 12/20/22    B)  L PTX   C)  R PTX-resolved  2. Seizures-focal  3. S/P Liver tx 2016  4. Acute renal failure-On HD        Overnight events:  Seizure this AM while on HD. Some transient hemodynamic lability.         Treatment goals for next 24 hours:   1. Send Serum and CSF for quantitative HHV6  2. EEG -ongoing  3. Ganciclovir IV- renal/IHD dosing  4. Acyclovir discontinued  5. Paracentesis per IR today  6. Limited PS/work trial post 5.        Alena Valenzuela MD  #[id number]  Critical Care Total Time 35 minutes-exclusive of procedures.             Assessment and Plan:     57 year old male with paroxysmal A. fib, Liver transplant (2016), CHRISTIAN on BiPAP, h/o CVA and hypertension. Patient  s/p Trach/PEG following acu had Rt sided Chest tube placed for hydroptx on 12/12/22 and  transferred to [facility] on 12/14/22 for CIP.   Pt  noted to have bloody stool on 12/15 and 12/16 and had left Ptx which required left sided chest tube leading to ~900ml of bloody output. Patient with PEA arrest 12/20/22 and now with seizure while on HD.    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 PEA arrest/? Mucus plug- not awake-CT head without acute process  2. H/o Embolic CVA: Elliquis is held secondary to presumed GI bleed  Plan:  1. Acyclovir changed to Ganciclovir  2. EEG-onoing  3. Keppra loaded  4. Neuro ICU  following      Pulmonary:  PEA/respiratory arrest earlier today-per report mucus plug  - Bronchoscopy 12/20/22- Unrevealing for mucus plugs or ANY secretions  - Small bloody plug removed today per RT with suctioning  S/P Trach (11/29) on vent-failed extubation X 2  - remains on full vent support  2. H/o CHRISTIAN was on BiPAP  3. New Left Ptx with acute on hypoxic respiratory failure and ? hemithorax: Etiology unclear. Pigtail placed on 12/16/2022 and significant decrease in volume of left pneumothorax  4. Hx of right hydropneumothorax: No output nor air leak. -  - Removed 12/19/22 per CT surgery  Plan:  1. Limited PS/work trials today post paracentesis  2. Continue L chest tube to suction        Cardiovascular system:  As above-s/p PEA arrest earlier today  - CPR  ~ 2 min no medications administered  - TTECHO 12/20/22: EF 70-75%, RV normal size/fx , negative bubble, no valvular disease, unable to assess RVSP  1. H/o HTN  2. Afib: On Eliquis but on hold due prior thought of GI bleed  Plan:  1. ECHO  4. Vasopressors as needed-maintain MAP >70 mm Hg- hold midodrine     Renal/Electrolytes:  1. LORETTA: was on CRRT but now on iHD.   - MWF schedule- had HD session 12/19/22- tolerated UF  Plan:  1. May need CRRT if     ID:  LP 12/21/22: HHV6 qualitative +ve   - HSV 1 + 2 negatvie, cryptococcus negative  1. H/o Strep/Parainfluenza infection: Completed treatment course  2. H/o Aspergillus: Was on Vori for <15days.  3. 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  - Per discussion with Transplant ID [id number] encephalitis rare in solid organ transplant  - Will obtain quantitative evaluation in CSF and serum  - Sputum from 12/20/22-staph epi and candida parapsilosis complex  - Blood cx NGTD  Plan:  1. Ganciclovir IV-dosing for IHD  2. Discontinue Acyclovir  3. Follow cxs      GI/:  1. GI bleed: No clear evidence ongoing Apparently had bloody stool overnight on 12/17. GI team  suspicious of ? iatrogenic rectal ulcer- no   2. Ascites: S/p Tap on 12/1 - no evidence of infection/Malignancy  - Of note-patient with markedly enlarged spleen prior to transplant that has not regressed post liver tx in 2016.  3. LIver tx: Cont Tac goal level of 5 -7 and solucortef.   CT abdomen/pelvis 12/19/22-mod-large ascites/enlarged spleen as above. No other obvious issues.  Now with diarrhea-lactulose restarted  NH3 wnl  Plan:  1. Paracentesis anticipated later today/tomorrow      Endocrine:  1. DM:  Plan:  - Was on lantus 30u daily. Will hold or cut down by 50%.  - SSI    Nutrition:  Resume TF. Will evaluate and recommence when bleed subsides    Heme/Onc:  1.  Pancytopenia;  Lymphopenic on diff  - Chronic splenomegaly-present prior to liver tx but has not regressed. Prior CT chest with evidence of subclavicular LN  ? Indolent blood dyscrasia  Plan:  1. Peripheral smear  2. Review recent scans for adenopathy.    ICU prophylaxis:      DVT; SCDs        VAP; As above     Restrain needed: No     Stress ulcer: IV PPI     Central line: Needed today for IV access/Meds.    Code Status: Full    Prognosis: Critical    Family update: Done at bedside by with patient's wife in attendance.                 Mechanical Ventilation:     Vent Mode: CMV/AC  (Continuous Mandatory Ventilation/ Assist Control)  FiO2 (%): 30 %  Resp Rate (Set): 16 breaths/min  Tidal Volume (Set, mL): 450 mL  PEEP (cm H2O): 5 cmH2O  Resp: 12           Medications:     Current Facility-Administered Medications   Medication     - MEDICATION INSTRUCTIONS for Dialysis Patients -     acetylcysteine (MUCOMYST) 20 % nebulizer solution 2 mL     albuterol (PROVENTIL) neb solution 2.5 mg     epoetin mirta-epbx (RETACRIT) injection 10,000 Units     folic acid injection 1 mg     hydrocortisone sodium succinate PF (solu-CORTEF) injection 25 mg     insulin aspart (NovoLOG) injection (RAPID ACTING)     [Auto Hold] insulin glargine (LANTUS PEN) injection 30 Units      ipratropium - albuterol 0.5 mg/2.5 mg/3 mL (DUONEB) neb solution 3 mL     lactulose (CHRONULAC) solution 20 g     levETIRAcetam (KEPPRA) 750 mg in sodium chloride 0.9 % 100 mL intermittent infusion     LORazepam (ATIVAN) injection 2 mg     midodrine (PROAMATINE) tablet 10 mg     multivitamins w/minerals liquid 15 mL     norepinephrine (LEVOPHED) 4 mg in  mL infusion PREMIX     nystatin (MYCOSTATIN) suspension 500,000 Units     ondansetron (ZOFRAN ODT) ODT tab 4 mg     oxyCODONE (ROXICODONE) tablet 5 mg     pantoprazole (PROTONIX) 80 mg in sodium chloride 0.9 % 100 mL infusion     propofol (DIPRIVAN) infusion     rifaximin (XIFAXAN) tablet 550 mg     sodium chloride (PF) 0.9% PF flush 10-40 mL     tacrolimus (GENERIC EQUIVALENT) suspension 1 mg            General: Alert, oriented, not in distress. S/p Trach  Vitals: /71   Pulse 78   Temp 97.3  F (36.3  C) (Oral)   Resp 12   Wt 95.8 kg (211 lb 3.2 oz)   SpO2 98%   BMI 28.64 kg/m     SpO2: 100 %    HEENT: neck supple, symmetrical, no lymph node enlargement, thyromegaly, bruit, JVD, pupils are isocoric and equally responsive to the light. Mallampati score, Grade III  Trach +. Healing lip lesions.  Lungs: both hemithoraces are symmetrical, normal to palpation, no dullness to percussion, auscultation of lungs revealed bilateral coarse crackles. Rubén chest tubes in place  CVS: Normal S1 and S2, no additional heart sounds, murmur, rub, normal peripheral pulses  Abdomen: Bowel sounds present, soft, Diffusely mildly tender, mildly distended, no organomegaly, ascitis, mass  Extremities/musculoskeletal: no peripheral edema, deformity, cyanosis, clubbing  Neurology: alert, orientedx3, no motor/sensorial deficits, cranial nerves grossly normal  Skin: no rash  Psychiatry: Mood and affect are appropriate       Exam of Line sites: No erythema or discharge.          Labs:     CBC RESULTS:        Recent Labs   LABS 12/22/22 12/21/22 12/20/22 12/19/22  0624   WBC 5.5  3.4 4.3 4.1   RBC    2.55*   HGB 8.5 8.0 8.2 8.2*   HCT 26.5 26.2 26.5 26.0*   PLT 72 75 84 84*             Basic Metabolic Panel:     Recent Labs   LABS 12/22/22 12/21/22 12/20/22 12/19/22  0624   Sodium 133 130 131 131*   Potassium 3.3 3.8 3.8 4.4   Chloride 99 95 96 94   CO2  24 28 26   BUN  55 50 75*   CR  2.66 2.22 3.06*    123  133*   KELLY  7.5 7.6 7.8*     INR  Recent Labs   Lab 12/21/22  1315 12/16/22  2239 12/16/22  1620   INR 1.36* 1.14 1.58*

## 2022-12-22 NOTE — PROGRESS NOTES
Renal Medicine Progress Note            Assessment/Plan:     Assessment: Blanco Osborne is a 56 yo male with PMH CARRILLO cirrhosis s/p liver tx (2016), afib, HTN, CHRISTIAN, CVA and recent prolonged admission after PEA arrest s/p trach/PEG re-admitted 12/16/2022 with hematochezia and L hemothorax s/p CT.     Anuric LORETTA requiring dialysis    Due to PEA arrest. CRRT discontinued 11/26, IHD started 11/29, remains on MWF schedule.   -No HD needed today, plan for HD tomorrow    Anemia of renal disease  Hematochezia, resolved    - epo 10K with dialysis   - GI consulted for poss GIB, hemoglobin stable no scope necessary.    Chronic hypotension  Possibly due to cirrhosis   - midodrine 10 mg TID     Acute on chronic hypoxic respiratory failure   S/p tracheostomy 11/29/22  Hemopneumothorax s/p L CT   H/o aspergillus PNA and multiple bacterial PNAs during last admission. Recent hemopneumothorax s/p L CT necessitating re-admission. Mucus plugging event 12/20 resulting in brief PEA arrest and severe hypoxia.       HHV 6 meningoencephalitis  Seizures  LUE focal weakness  Initial seizure after PEA arrest 12/20. Another seizure 12/21 during dialysis run after episode of mild hypotension (no hypoxia noted). Neurology following, LP showing HHV-6.  Initially acyclovir started switch to ganciclovir IV.  -Renally dose meds    CARRILLO cirrhosis s/p liver tx  Immunosuppression per GI.          Interval History:     -LP done yesterday, positive for HHV-6, acyclovir started.  Switch to ganciclovir today  -Vent settings stable, not overtly fluid overloaded, K okay.  No need for additional dialysis today.  -More awake today, following commands, answers simple yes/no questions          Medications and Allergies:       - MEDICATION INSTRUCTIONS for Dialysis Patients -   Does not apply See Admin Instructions     B and C vitamin Complex with folic acid  5 mL Per Feeding Tube Daily     carboxymethylcellulose PF  1 drop Both Eyes TID     chlorhexidine  15  mL Mouth/Throat Q12H     epoetin mirta-epbx  10,000 Units Subcutaneous Once per day on Mon Wed Fri     folic acid  1 mg Intravenous Daily     ganciclovir (CYTOVENE) intermittent infusion  1.25 mg/kg Intravenous Q Mon Wed Fri AM     hydrocortisone sodium succinate PF  25 mg Intravenous Q6H     insulin aspart  1-6 Units Subcutaneous Q4H     [Auto Hold] insulin glargine  30 Units Subcutaneous QAM AC     ipratropium - albuterol 0.5 mg/2.5 mg/3 mL  3 mL Nebulization 4x daily     lacosamide (VIMPAT) intermittent infusion  100 mg Intravenous BID     lactulose  20 g Oral or Feeding Tube TID     levETIRAcetam  1,000 mg Intravenous Q24H     midodrine  10 mg Oral or Feeding Tube TID w/meals     multivitamins w/minerals  15 mL Per Feeding Tube Daily     nystatin  500,000 Units Swish & Swallow 4x Daily     pantoprazole  40 mg Intravenous BID 09 12     rifaximin  550 mg Per Feeding Tube BID     tacrolimus  1 mg Oral or Feeding Tube BID      No Known Allergies         Physical Exam:   Vitals were reviewed  /71   Pulse 82   Temp 98.2  F (36.8  C) (Axillary)   Resp 27   Wt 95.8 kg (211 lb 3.2 oz)   SpO2 100%   BMI 28.64 kg/m      Wt Readings from Last 3 Encounters:   12/22/22 95.8 kg (211 lb 3.2 oz)   12/16/22 100.2 kg (221 lb)   12/16/22 100.2 kg (221 lb)       Intake/Output Summary (Last 24 hours) at 12/20/2022 1146  Last data filed at 12/20/2022 1000  Gross per 24 hour   Intake 2360 ml   Output 1306 ml   Net 1054 ml       GENERAL APPEARANCE: trached  HEENT:  MMM  RESP: clear bilaterally   CV: RRR  ABDOMEN: Distended but soft and nontender.  Very active bowel sounds  EXTREMITIES/SKIN: no LE edema  NEURO: Awakens to voice, answers yes/no questions with nodding, follows simple commands  LINES: L CT, RIJ TDC clean and intact            Data:     BMP  Recent Labs   Lab 12/22/22  1108 12/22/22  0918 12/22/22  0406 12/22/22  0008 12/21/22  0406 12/21/22  0405 12/20/22  0432 12/20/22  0426 12/19/22  0940 12/19/22  0624   NA  133  --   --   --   --  130*  --  131*  --  131*   POTASSIUM 3.3*  --   --   --   --  3.8  --  3.8  --  4.4   CHLORIDE 99  --   --   --   --  95  --  96  --  94   KELLY 7.6*  --   --   --   --  7.5*  --  7.6*  --  7.8*   CO2 26  --   --   --   --  24  --  28  --  26   BUN 44*  --   --   --   --  55*  --  50*  --  75*   CR 2.45*  --   --   --   --  2.66*  --  2.22*  --  3.06*   * 158* 133* 120*   < > 120*   < > 129*   < > 133*    < > = values in this interval not displayed.     CBC  Recent Labs   Lab 12/22/22  1108 12/21/22  1315 12/21/22  0405 12/20/22  0426   WBC 5.5 3.4* 4.4 4.3   HGB 8.5* 8.0* 8.1* 8.2*   HCT 26.5* 26.2* 26.6* 26.5*   MCV 99 102* 101* 102*   PLT 72* 75* 82* 84*     Lab Results   Component Value Date    AST 16 12/22/2022    ALT 17 12/22/2022    ALKPHOS 163 (H) 12/22/2022    BILITOTAL 0.8 12/22/2022    CHANDLER 32 12/20/2022     Lab Results   Component Value Date    INR 1.36 (H) 12/21/2022       Attestation:  I have reviewed today's vital signs, notes, medications, labs and imaging.    Dwayne Laboy MD  [facility] - Nephrology  Office: [phone]

## 2022-12-22 NOTE — PROGRESS NOTES
THORACIC SURGERY PROGRESS NOTE    Very minimal output from left chest tube. CXR from today demonstrates resolution of left pneumothorax. Small bilateral pleural effusions.     Left chest tube removed without complication. Occlusive dressing applied.    Will discontinue daily CXR.     Thoracic Surgery will sign off, please call with questions/concerns.    Gunjan Gonzales PA-C with Dr. Nilesh Jackson  [facility]  Office: [phone]

## 2022-12-22 NOTE — PROGRESS NOTES
Stroke Progress Note    Interval Events- NAEON  - HHV6 detected in CSF ME panel    HPI Summary  Blanco Osborne is a 56 YO M w/vascular RFs: afib with AC currently held in setting of hemothorax with chest tubes, CHRISTIAN on BiPAP and PMHx of liver failure 2/2 EtOH use s/p hepatic transplant on tacrolimus, s/p trach/PEG following recent hospitalization at OSH for AHRF 2/2 strep PNA c/b PEA and septic shock with multiorgan failure/injury.     He was readmitted to OSH for acute GIB and L hemothorax requiring chest tube at OSH. After blood transfusion he was transferred to [facility] on 12/16.      On 12/19 had brief PEA arrest (difficult to ventilate, hypoxic, bradycardic), felt 2/2 to primary pulmonary (mucous plugging). Following PEA (about 20 min) he had witnessed GTC lasting about 5 min and resolving with 2mg IV lorazepam. Keppra 2 gm load subsequently given.     12/21: Acute MS change with focal motor movements in mouth, electrographic correlate of x2 GTCs. Seizures happened following dialysis - possible that dialysis elimination of keppra contributed to seizures.     12/22: LP +HHV6    Stroke Evaluation Summarized    MRI/Head CT 1. Scattered tiny recent ischemic infarcts in the cerebral hemispheres  bilaterally consistent with embolic infarcts from a central embolic  source.  2. Diffuse cerebral volume loss and cerebral white matter changes  consistent with chronic small vessel ischemic disease.      Intracranial Vasculature NA   Cervical Vasculature NA     Echocardiogram Normal left ventricular wall thickness.  Left ventricular systolic function is normal.  The visual ejection fraction is estimated at 70-75%.  Left ventricular diastolic function is normal.  No regional wall motion abnormalities noted.  The right ventricle is normal in size and function.  A contrast injection (Bubble Study) was performed that was negative for flow  across the interatrial septum.  No significant  valve disease.  Right ventricular systolic pressure could not be approximated due to  inadequate tricuspid regurgitation.  The inferior vena cava is normal.   EKG/Telemetry Sinus rhythm   Other Testing Not Applicable      LDL  12/21/2022: 41 mg/dL   A1C  11/19/2022: 4.7 %   Troponin No lab value available in past 48 hrs       Impression/Plan  # HHV6 meningoencephalitis c/b encephalopathy, seizures.  # Multiple electro-clinical generalized seizures (focal mouth movements with clear change in mental status) on 12/21. These new breakthrough seizures were post dialysis - may be related keppra removal vs electrolyte shifts. LP with 58/3/71/69. ME panel was +HHV6, which in immunocompromised pt, especially one with encephalopathy/new onset seizures, likely represents etiology for symptoms. CSF WBC may be spuriously suppressed 2/2 immunosuppression from anti-rejection medications and symptomatic HHV6 reactivation in critically ill, immunocompromised pt is very possible. On exam, pt much more alert today, following commands, moving all extremities although ongoing focal LUE paresis. BLE > BUE as well.   - cEEG - now about 24 hours from last set of seizures  - Cont keppra 1 gm daily (renally dosed, dialysis dosed) - appreciate Pharmacy help  - 12/21: Initiated  mg load with 100 mg BID dosing for new break through seizures post dialysis   - Recommend ID consult, agree with treating HHV6 with specific targeted antiviral therapies for above reasons     - Will need Pharmacy help to renally dose     # Multifocal small acute ischemic strokes. MR with small strokes in L temporal, R parietal, and L parietal lobes - suspicious for embolic etiology. Suspect 2/2 to cardioembolism from known afib, critically ill state. AC has been held for acute blood loss anemia, hemothorax. TTE reviewed above. A1c 4.7 on 11/19. LDL pending. Small strokes would not be expected to be primary factor in ongoing encephalopathy. Imaging also does not  "explain focal LUE weakness.   - MRA H/N non-urgently/when more stable to complete workup  - Resume PTA AC when GI gives him clearance and stable from hemothorax perspective for secondary stroke prevention (known afib previously on AC)  - If acute blood loss anemia is ongoing concern, can consider bridging with ASA 81 mg until he is stable enough for AC     Patient Follow-up    - final recommendation pending work-up    We will continue to follow.     The Stroke Staff is Dr. Shetty.    Maurice Benjamin MD  Vascular Neurology Fellow  To page me or covering stroke neurology team member, click here: [email or web address]   Choose \"On Call\" tab at top, then search dropdown box for \"Neurology Adult\", select location, press Enter, then look for stroke/neuro ICU/telestroke.    ______________________________________________________    Clinically Significant Risk Factors         # Hyponatremia: Lowest Na = 130 mmol/L in last 2 days, will monitor as appropriate      # Hypoalbuminemia: Lowest albumin = 1.7 g/dL at 12/16/2022 10:39 PM, will monitor as appropriate   # Thrombocytopenia: Lowest platelets = 75 in last 2 days, will monitor for bleeding          # Severe Malnutrition: based on nutrition assessment          Medications   Scheduled Meds    - MEDICATION INSTRUCTIONS for Dialysis Patients -   Does not apply See Admin Instructions     B and C vitamin Complex with folic acid  5 mL Per Feeding Tube Daily     carboxymethylcellulose PF  1 drop Both Eyes TID     chlorhexidine  15 mL Mouth/Throat Q12H     epoetin mirta-epbx  10,000 Units Subcutaneous Once per day on Mon Wed Fri     folic acid  1 mg Intravenous Daily     ganciclovir (CYTOVENE) intermittent infusion  1.25 mg/kg Intravenous Q Mon Wed Fri AM     hydrocortisone sodium succinate PF  25 mg Intravenous Q6H     insulin aspart  1-6 Units Subcutaneous Q4H     [Auto Hold] insulin glargine  30 Units Subcutaneous QAM AC     ipratropium - albuterol 0.5 mg/2.5 mg/3 mL  3 mL Nebulization 4x daily     " lacosamide (VIMPAT) intermittent infusion  100 mg Intravenous BID     lactulose  20 g Oral or Feeding Tube TID     levETIRAcetam  1,000 mg Intravenous Q24H     midodrine  10 mg Oral or Feeding Tube TID w/meals     multivitamins w/minerals  15 mL Per Feeding Tube Daily     nystatin  500,000 Units Swish & Swallow 4x Daily     pantoprazole  40 mg Intravenous BID 09 12     rifaximin  550 mg Per Feeding Tube BID     tacrolimus  1 mg Oral or Feeding Tube BID       Infusion Meds    dextrose       norepinephrine Stopped (12/21/22 1037)     propofol Stopped (12/20/22 1719)     vasopressin Stopped (12/21/22 1147)       PRN Meds  albuterol, dextrose, glucose **OR** dextrose **OR** glucagon, naloxone **OR** naloxone **OR** naloxone **OR** naloxone, ondansetron, oxyCODONE       PHYSICAL EXAMINATION  Temp:  [96.8  F (36  C)-97.5  F (36.4  C)] 97.3  F (36.3  C)  Pulse:  [] 78  Resp:  [0-84] 12  MAP:  [56 mmHg-120 mmHg] 68 mmHg  Arterial Line BP: ()/(44-91) 103/49  FiO2 (%):  [30 %] 30 %  SpO2:  [96 %-100 %] 98 %      Neuro Exam  MS: E2, trached, M6, follows most commands (opens mouth, squeezes hands)  CN: Pupils 3 mm and reactive bilat, does appear to BTT but also does appear to have some vision deficits with poor visual tracking, inability to count fingers, grimace to pain is symmetric  Motor: Is not antigravity in any limb, but LUE appears weaker vs RUE, and BLE weaker than BUE  Sensory: Senses pain in all 4 limbs    Stroke Scales    NIHSS  1a. Level of Consciousness 0-->Alert, keenly responsive   1b. LOC Questions 2-->Answers neither question correctly   1c. LOC Commands 0-->Performs both tasks correctly   2.   Best Gaze 0-->Normal   3.   Visual 0-->No visual loss   4.   Facial Palsy 0-->Normal symmetrical movements   5a. Motor Arm, Left 3-->No effort against gravity, limb falls   5b. Motor Arm, Right 3-->No effort against gravity, limb falls   6a. Motor Leg, Left 3-->No effort against gravity, leg falls to bed  immediately   6b. Motor Leg, right 3-->No effort against gravity, leg falls to bed immediately   7.   Limb Ataxia 0-->Absent   8.   Sensory 0-->Normal, no sensory loss   9.   Best Language 1-->Mild-to-moderate aphasia, some obvious loss of fluency or facility of comprehension, without significant limitation on ideas expressed or form of expression. Reduction of speech and/or comprehension, however, makes conversation. . . (see row details)   10. Dysarthria (UN) Intubated or other physical barrier   11. Extinction and Inattention  0-->No abnormality   Total 15 (12/21/22 1301)       Modified Princeton Score (Pre-morbid)  5 - Severe disability.  Requires constant nursing care and attention, bedridden.    Imaging  I personally reviewed all imaging; relevant findings per HPI.     Lab Results Data   CBC  Recent Labs   Lab 12/21/22  1315 12/21/22  0405 12/20/22  0426   WBC 3.4* 4.4 4.3   RBC 2.58* 2.63* 2.60*   HGB 8.0* 8.1* 8.2*   HCT 26.2* 26.6* 26.5*   PLT 75* 82* 84*     Basic Metabolic Panel    Recent Labs   Lab 12/22/22  0406 12/22/22  0008 12/21/22 2017 12/21/22  0406 12/21/22  0405 12/20/22  0432 12/20/22  0426 12/19/22  0940 12/19/22  0624   NA  --   --   --   --  130*  --  131*  --  131*   POTASSIUM  --   --   --   --  3.8  --  3.8  --  4.4   CHLORIDE  --   --   --   --  95  --  96  --  94   CO2  --   --   --   --  24  --  28  --  26   BUN  --   --   --   --  55*  --  50*  --  75*   CR  --   --   --   --  2.66*  --  2.22*  --  3.06*   * 120* 111*   < > 120*   < > 129*   < > 133*   KELLY  --   --   --   --  7.5*  --  7.6*  --  7.8*    < > = values in this interval not displayed.     Liver Panel  Recent Labs   Lab 12/21/22  0405 12/20/22  0426 12/19/22  0624 12/16/22 2239   PROTTOTAL 4.7* 5.0*  --  5.1*   ALBUMIN 2.0*  1.9* 2.0* 2.1*  1.9* 1.7*   BILITOTAL 1.1 1.8*  --  0.9   ALKPHOS 133 147  --  251*   AST 20 20  --  21   ALT 17 19  --  20     INR    Recent Labs   Lab Test 12/21/22  1315 12/16/22  8351  12/16/22  1620   INR 1.36* 1.14 1.58*      Lipid Profile    Recent Labs   Lab Test 12/21/22  1315 11/20/22  1151 04/20/20  1157   CHOL 89 121 161   HDL 27* 22* 42   LDL 41 80 81   TRIG 106 94 192*     A1C    Recent Labs   Lab Test 11/19/22  0212 09/22/16  0741   A1C 4.7 4.8     Troponin  No results for input(s): CTROPT, TROPONINIS, TROPONINI, GHTROP in the last 168 hours.       Data

## 2022-12-22 NOTE — PROGRESS NOTES
Resp: CTS here this afternoon and dced left CT. Site intact with occlusive dressing. LS clear and dim. Trach care per plan of care. No weaning today. Vent: 30%/16/450/5. sats 100%.Scant scrt oral/ett  CV: SR. BP WNL. Right groin macie intact. CMS +2. Midodrine TID. Afebrile.  GI: TF at goal +BS. Fecal collection device remains in place with moderate returns. Abdo tapped for 4lts. Spec sent to lab.   : anuric. Plan for HD 12/23/22.  Neuro: flat affect. Able to communicate needs. Moves right upper with intention and moderate strength. Left upper has gross motor movement with wk strength ( baseline). Left and right lower ext with gross motor movement and wk strength. Will ask MD to reorder PT/OT as able. EFRAIN. continuous EEG monitoring.   Social: Pt's wife and brother at bedside this midmorning. Attentive and supportive of pt. Updated of all current findings and ongoing goals of care.   csccrn

## 2022-12-22 NOTE — PROGRESS NOTES
Crosscover note:    Notified that CSF is positive for HHV6. Patient started on acyclovir today, will change to ganciclovir renally dosed.      Milton Richard M.D.  Pulmonary & Critical Care  Pager: Click Here to page

## 2022-12-22 NOTE — PLAN OF CARE
Pt alert. Moving extremities. Following, attempts to mouth words, nods head. EEG monitoring in progress. Vent on trach. Tolerating Tube feedings. Left chest tube patent with minimal serous drainage. Clamped Chest tube as ordered; unclamped after chest x-ray. CSF +ve for Human Herpes  6,  MD notified. Changed Acyclovir to Ganciclovir: pharmacy stated that med not available at this facility but will try to find in am; MD notified. Had Multiple watery stools; rectal tube placed. Possible paracentesis today.

## 2022-12-22 NOTE — PLAN OF CARE
[id number]    Pt alert, follows commands most of the time when arousable. PERRLA, Left sided weakness. Ax2 lift. SR (was sinus tachycardic with seizures). Hypertensive with seizures as well. Trach. No change to vent settings. PEG tube in place. Tube feeding started at 10ml/hr. 30mL q 4hr flushes. Rectal tubed removed due to low output over 24 hours. Lactulose restarted this afternoon.     Protonix gtt discontinued - changed to BID protonix IV Push. Dialyzed today - net 300mL gain. Pt had 2 seizures today - given ativan for both occurrences. Pt became tachycardic/hypertensive during seizure activity - returned to baseline after.  Paracentesis delayed to tomorrow. Pt had LP performed at bedside - specimens sent and pending. Started on vimpat and acyclovir.

## 2022-12-23 ENCOUNTER — HOSPITAL ENCOUNTER (OUTPATIENT)
Dept: NEUROLOGY | Facility: CLINIC | Age: 57
Discharge: HOME | DRG: 207 | End: 2022-12-23
Attending: SURGERY | Admitting: INTERNAL MEDICINE
Payer: COMMERCIAL

## 2022-12-23 PROBLEM — N18.5 CHRONIC KIDNEY DISEASE, STAGE V (H): Status: ACTIVE | Noted: 2022-12-23

## 2022-12-23 PROBLEM — B10.81 HUMAN HERPESVIRUS 6 VIREMIA: Status: ACTIVE | Noted: 2022-12-23

## 2022-12-23 PROBLEM — I46.9 CARDIAC ARREST (H): Status: ACTIVE | Noted: 2022-12-23

## 2022-12-23 PROBLEM — R56.9 SEIZURES (H): Status: ACTIVE | Noted: 2022-12-23

## 2022-12-23 LAB
ALBUMIN SERPL-MCNC: 1.9 G/DL (ref 3.4–5)
ALP SERPL-CCNC: 198 U/L (ref 40–150)
ALT SERPL W P-5'-P-CCNC: 17 U/L (ref 0–70)
ANION GAP SERPL CALCULATED.3IONS-SCNC: 8 MMOL/L (ref 3–14)
AST SERPL W P-5'-P-CCNC: 14 U/L (ref 0–45)
BASOPHILS # BLD AUTO: 0 10E3/UL (ref 0–0.2)
BASOPHILS NFR BLD AUTO: 0 %
BILIRUB SERPL-MCNC: 0.6 MG/DL (ref 0.2–1.3)
BUN SERPL-MCNC: 48 MG/DL (ref 7–30)
CALCIUM SERPL-MCNC: 7.4 MG/DL (ref 8.5–10.1)
CHLORIDE BLD-SCNC: 101 MMOL/L (ref 94–109)
CO2 SERPL-SCNC: 26 MMOL/L (ref 20–32)
CREAT SERPL-MCNC: 2.83 MG/DL (ref 0.66–1.25)
EOSINOPHIL # BLD AUTO: 0 10E3/UL (ref 0–0.7)
EOSINOPHIL NFR BLD AUTO: 0 %
ERYTHROCYTE [DISTWIDTH] IN BLOOD BY AUTOMATED COUNT: 17.9 % (ref 10–15)
GFR SERPL CREATININE-BSD FRML MDRD: 25 ML/MIN/1.73M2
GLUCOSE BLD-MCNC: 205 MG/DL (ref 70–99)
GLUCOSE BLDC GLUCOMTR-MCNC: 151 MG/DL (ref 70–99)
GLUCOSE BLDC GLUCOMTR-MCNC: 159 MG/DL (ref 70–99)
GLUCOSE BLDC GLUCOMTR-MCNC: 189 MG/DL (ref 70–99)
GLUCOSE BLDC GLUCOMTR-MCNC: 190 MG/DL (ref 70–99)
GLUCOSE BLDC GLUCOMTR-MCNC: 193 MG/DL (ref 70–99)
GLUCOSE BLDC GLUCOMTR-MCNC: 197 MG/DL (ref 70–99)
HCT VFR BLD AUTO: 26.6 % (ref 40–53)
HGB BLD-MCNC: 8.2 G/DL (ref 13.3–17.7)
IMM GRANULOCYTES # BLD: 0 10E3/UL
IMM GRANULOCYTES NFR BLD: 1 %
LYMPHOCYTES # BLD AUTO: 0.7 10E3/UL (ref 0.8–5.3)
LYMPHOCYTES NFR BLD AUTO: 12 %
MAGNESIUM SERPL-MCNC: 1.8 MG/DL (ref 1.6–2.3)
MCH RBC QN AUTO: 31.9 PG (ref 26.5–33)
MCHC RBC AUTO-ENTMCNC: 30.8 G/DL (ref 31.5–36.5)
MCV RBC AUTO: 104 FL (ref 78–100)
MONOCYTES # BLD AUTO: 0.4 10E3/UL (ref 0–1.3)
MONOCYTES NFR BLD AUTO: 8 %
NEUTROPHILS # BLD AUTO: 4.2 10E3/UL (ref 1.6–8.3)
NEUTROPHILS NFR BLD AUTO: 79 %
NRBC # BLD AUTO: 0 10E3/UL
NRBC BLD AUTO-RTO: 0 /100
PHOSPHATE SERPL-MCNC: 4.3 MG/DL (ref 2.5–4.5)
PLATELET # BLD AUTO: 76 10E3/UL (ref 150–450)
POTASSIUM BLD-SCNC: 3.3 MMOL/L (ref 3.4–5.3)
POTASSIUM BLD-SCNC: 3.6 MMOL/L (ref 3.4–5.3)
PROT SERPL-MCNC: 4.6 G/DL (ref 6.8–8.8)
RBC # BLD AUTO: 2.57 10E6/UL (ref 4.4–5.9)
SODIUM SERPL-SCNC: 135 MMOL/L (ref 133–144)
VDRL CSF QL: NON REACTIVE
WBC # BLD AUTO: 5.3 10E3/UL (ref 4–11)

## 2022-12-23 PROCEDURE — 99291 CRITICAL CARE FIRST HOUR: CPT | Mod: GC | Performed by: PSYCHIATRY & NEUROLOGY

## 2022-12-23 PROCEDURE — 94640 AIRWAY INHALATION TREATMENT: CPT

## 2022-12-23 PROCEDURE — 250N000011 HC RX IP 250 OP 636: Performed by: INTERNAL MEDICINE

## 2022-12-23 PROCEDURE — 250N000011 HC RX IP 250 OP 636: Performed by: PSYCHIATRY & NEUROLOGY

## 2022-12-23 PROCEDURE — 95718 EEG PHYS/QHP 2-12 HR W/VEEG: CPT | Performed by: PSYCHIATRY & NEUROLOGY

## 2022-12-23 PROCEDURE — C9113 INJ PANTOPRAZOLE SODIUM, VIA: HCPCS | Performed by: PHYSICIAN ASSISTANT

## 2022-12-23 PROCEDURE — C9254 INJECTION, LACOSAMIDE: HCPCS | Performed by: PSYCHIATRY & NEUROLOGY

## 2022-12-23 PROCEDURE — 634N000001 HC RX 634: Performed by: SURGERY

## 2022-12-23 PROCEDURE — 200N000001 HC R&B ICU

## 2022-12-23 PROCEDURE — 80053 COMPREHEN METABOLIC PANEL: CPT | Performed by: INTERNAL MEDICINE

## 2022-12-23 PROCEDURE — 634N000001 HC RX 634: Performed by: STUDENT IN AN ORGANIZED HEALTH CARE EDUCATION/TRAINING PROGRAM

## 2022-12-23 PROCEDURE — 95711 VEEG 2-12 HR UNMONITORED: CPT

## 2022-12-23 PROCEDURE — 250N000013 HC RX MED GY IP 250 OP 250 PS 637: Performed by: PHYSICIAN ASSISTANT

## 2022-12-23 PROCEDURE — 258N000003 HC RX IP 258 OP 636: Performed by: PSYCHIATRY & NEUROLOGY

## 2022-12-23 PROCEDURE — 250N000013 HC RX MED GY IP 250 OP 250 PS 637: Performed by: SURGERY

## 2022-12-23 PROCEDURE — 94003 VENT MGMT INPAT SUBQ DAY: CPT

## 2022-12-23 PROCEDURE — 250N000013 HC RX MED GY IP 250 OP 250 PS 637: Performed by: INTERNAL MEDICINE

## 2022-12-23 PROCEDURE — 250N000009 HC RX 250: Performed by: INTERNAL MEDICINE

## 2022-12-23 PROCEDURE — 999N000157 HC STATISTIC RCP TIME EA 10 MIN

## 2022-12-23 PROCEDURE — 94640 AIRWAY INHALATION TREATMENT: CPT | Mod: 76

## 2022-12-23 PROCEDURE — 250N000011 HC RX IP 250 OP 636: Performed by: PHYSICIAN ASSISTANT

## 2022-12-23 PROCEDURE — 90935 HEMODIALYSIS ONE EVALUATION: CPT | Performed by: STUDENT IN AN ORGANIZED HEALTH CARE EDUCATION/TRAINING PROGRAM

## 2022-12-23 PROCEDURE — 99291 CRITICAL CARE FIRST HOUR: CPT | Performed by: INTERNAL MEDICINE

## 2022-12-23 PROCEDURE — 85025 COMPLETE CBC W/AUTO DIFF WBC: CPT | Performed by: INTERNAL MEDICINE

## 2022-12-23 PROCEDURE — 84132 ASSAY OF SERUM POTASSIUM: CPT | Performed by: INTERNAL MEDICINE

## 2022-12-23 PROCEDURE — 250N000011 HC RX IP 250 OP 636: Performed by: STUDENT IN AN ORGANIZED HEALTH CARE EDUCATION/TRAINING PROGRAM

## 2022-12-23 PROCEDURE — P9045 ALBUMIN (HUMAN), 5%, 250 ML: HCPCS | Performed by: STUDENT IN AN ORGANIZED HEALTH CARE EDUCATION/TRAINING PROGRAM

## 2022-12-23 PROCEDURE — 84100 ASSAY OF PHOSPHORUS: CPT | Performed by: STUDENT IN AN ORGANIZED HEALTH CARE EDUCATION/TRAINING PROGRAM

## 2022-12-23 PROCEDURE — 83735 ASSAY OF MAGNESIUM: CPT | Performed by: INTERNAL MEDICINE

## 2022-12-23 PROCEDURE — 250N000011 HC RX IP 250 OP 636: Performed by: SURGERY

## 2022-12-23 PROCEDURE — 250N000012 HC RX MED GY IP 250 OP 636 PS 637: Performed by: INTERNAL MEDICINE

## 2022-12-23 PROCEDURE — 90937 HEMODIALYSIS REPEATED EVAL: CPT

## 2022-12-23 PROCEDURE — 99207 EEG VIDEO 2-12 HRS UNMONITORED: CPT | Performed by: PSYCHIATRY & NEUROLOGY

## 2022-12-23 PROCEDURE — 258N000003 HC RX IP 258 OP 636: Performed by: SURGERY

## 2022-12-23 RX ORDER — POTASSIUM CHLORIDE 20MEQ/15ML
40 LIQUID (ML) ORAL ONCE
Status: COMPLETED | OUTPATIENT
Start: 2022-12-23 | End: 2022-12-23

## 2022-12-23 RX ORDER — HEPARIN SODIUM 5000 [USP'U]/.5ML
5000 INJECTION, SOLUTION INTRAVENOUS; SUBCUTANEOUS EVERY 8 HOURS
Status: DISCONTINUED | OUTPATIENT
Start: 2022-12-23 | End: 2022-12-24

## 2022-12-23 RX ADMIN — MIDODRINE HYDROCHLORIDE 10 MG: 5 TABLET ORAL at 08:45

## 2022-12-23 RX ADMIN — EPOETIN ALFA-EPBX 4000 UNITS: 4000 INJECTION, SOLUTION INTRAVENOUS; SUBCUTANEOUS at 12:00

## 2022-12-23 RX ADMIN — FOLIC ACID 1 MG: 5 INJECTION, SOLUTION INTRAMUSCULAR; INTRAVENOUS; SUBCUTANEOUS at 08:45

## 2022-12-23 RX ADMIN — INSULIN ASPART 2 UNITS: 100 INJECTION, SOLUTION INTRAVENOUS; SUBCUTANEOUS at 09:01

## 2022-12-23 RX ADMIN — OXYCODONE HYDROCHLORIDE 2.5 MG: 5 TABLET ORAL at 08:45

## 2022-12-23 RX ADMIN — NYSTATIN 500000 UNITS: 100000 SUSPENSION ORAL at 13:15

## 2022-12-23 RX ADMIN — TACROLIMUS 1 MG: 5 CAPSULE ORAL at 21:09

## 2022-12-23 RX ADMIN — LACTULOSE 20 G: 20 SOLUTION ORAL at 15:14

## 2022-12-23 RX ADMIN — NYSTATIN 500000 UNITS: 100000 SUSPENSION ORAL at 17:48

## 2022-12-23 RX ADMIN — INSULIN ASPART 2 UNITS: 100 INJECTION, SOLUTION INTRAVENOUS; SUBCUTANEOUS at 04:02

## 2022-12-23 RX ADMIN — Medication 1 DROP: at 08:45

## 2022-12-23 RX ADMIN — HYDROCORTISONE SODIUM SUCCINATE 25 MG: 100 INJECTION, POWDER, FOR SOLUTION INTRAMUSCULAR; INTRAVENOUS at 13:15

## 2022-12-23 RX ADMIN — PANTOPRAZOLE SODIUM 40 MG: 40 INJECTION, POWDER, FOR SOLUTION INTRAVENOUS at 08:45

## 2022-12-23 RX ADMIN — NYSTATIN 500000 UNITS: 100000 SUSPENSION ORAL at 08:45

## 2022-12-23 RX ADMIN — INSULIN ASPART 1 UNITS: 100 INJECTION, SOLUTION INTRAVENOUS; SUBCUTANEOUS at 21:07

## 2022-12-23 RX ADMIN — HYDROCORTISONE SODIUM SUCCINATE 25 MG: 100 INJECTION, POWDER, FOR SOLUTION INTRAMUSCULAR; INTRAVENOUS at 08:45

## 2022-12-23 RX ADMIN — HYDROCORTISONE SODIUM SUCCINATE 25 MG: 100 INJECTION, POWDER, FOR SOLUTION INTRAMUSCULAR; INTRAVENOUS at 21:06

## 2022-12-23 RX ADMIN — IPRATROPIUM BROMIDE AND ALBUTEROL SULFATE 3 ML: .5; 3 SOLUTION RESPIRATORY (INHALATION) at 11:17

## 2022-12-23 RX ADMIN — SODIUM CHLORIDE 100 MG: 9 INJECTION, SOLUTION INTRAVENOUS at 09:13

## 2022-12-23 RX ADMIN — NYSTATIN 500000 UNITS: 100000 SUSPENSION ORAL at 21:06

## 2022-12-23 RX ADMIN — GANCICLOVIR SODIUM 120 MG: 500 INJECTION, POWDER, LYOPHILIZED, FOR SOLUTION INTRAVENOUS at 14:21

## 2022-12-23 RX ADMIN — LEVETIRACETAM 1000 MG: 10 INJECTION INTRAVENOUS at 12:06

## 2022-12-23 RX ADMIN — PANTOPRAZOLE SODIUM 40 MG: 40 INJECTION, POWDER, FOR SOLUTION INTRAVENOUS at 12:12

## 2022-12-23 RX ADMIN — HYDROCORTISONE SODIUM SUCCINATE 25 MG: 100 INJECTION, POWDER, FOR SOLUTION INTRAMUSCULAR; INTRAVENOUS at 03:25

## 2022-12-23 RX ADMIN — EPOETIN ALFA-EPBX 10000 UNITS: 10000 INJECTION, SOLUTION INTRAVENOUS; SUBCUTANEOUS at 12:00

## 2022-12-23 RX ADMIN — LACTULOSE 20 G: 20 SOLUTION ORAL at 08:44

## 2022-12-23 RX ADMIN — HEPARIN SODIUM 5000 UNITS: 5000 INJECTION, SOLUTION INTRAVENOUS; SUBCUTANEOUS at 21:06

## 2022-12-23 RX ADMIN — RIFAXIMIN 550 MG: 550 TABLET ORAL at 21:06

## 2022-12-23 RX ADMIN — LACTULOSE 20 G: 20 SOLUTION ORAL at 21:06

## 2022-12-23 RX ADMIN — MULTIVITAMIN 15 ML: LIQUID ORAL at 08:45

## 2022-12-23 RX ADMIN — IPRATROPIUM BROMIDE AND ALBUTEROL SULFATE 3 ML: .5; 3 SOLUTION RESPIRATORY (INHALATION) at 19:39

## 2022-12-23 RX ADMIN — INSULIN ASPART 1 UNITS: 100 INJECTION, SOLUTION INTRAVENOUS; SUBCUTANEOUS at 15:14

## 2022-12-23 RX ADMIN — TACROLIMUS 1 MG: 5 CAPSULE ORAL at 08:45

## 2022-12-23 RX ADMIN — POTASSIUM CHLORIDE 40 MEQ: 20 SOLUTION ORAL at 08:45

## 2022-12-23 RX ADMIN — Medication 1 DROP: at 21:06

## 2022-12-23 RX ADMIN — ALBUMIN HUMAN 250 ML: 0.05 INJECTION, SOLUTION INTRAVENOUS at 11:56

## 2022-12-23 RX ADMIN — SODIUM CHLORIDE 100 MG: 9 INJECTION, SOLUTION INTRAVENOUS at 21:06

## 2022-12-23 RX ADMIN — CHLORHEXIDINE GLUCONATE 0.12% ORAL RINSE 15 ML: 1.2 LIQUID ORAL at 08:44

## 2022-12-23 RX ADMIN — INSULIN ASPART 2 UNITS: 100 INJECTION, SOLUTION INTRAVENOUS; SUBCUTANEOUS at 01:00

## 2022-12-23 RX ADMIN — RIFAXIMIN 550 MG: 550 TABLET ORAL at 08:45

## 2022-12-23 RX ADMIN — IPRATROPIUM BROMIDE AND ALBUTEROL SULFATE 3 ML: .5; 3 SOLUTION RESPIRATORY (INHALATION) at 14:28

## 2022-12-23 RX ADMIN — MIDODRINE HYDROCHLORIDE 10 MG: 5 TABLET ORAL at 12:06

## 2022-12-23 RX ADMIN — CHLORHEXIDINE GLUCONATE 0.12% ORAL RINSE 15 ML: 1.2 LIQUID ORAL at 21:06

## 2022-12-23 RX ADMIN — Medication 1 DROP: at 15:14

## 2022-12-23 RX ADMIN — INSULIN ASPART 1 UNITS: 100 INJECTION, SOLUTION INTRAVENOUS; SUBCUTANEOUS at 12:06

## 2022-12-23 RX ADMIN — Medication 5 ML: at 08:45

## 2022-12-23 RX ADMIN — IPRATROPIUM BROMIDE AND ALBUTEROL SULFATE 3 ML: .5; 3 SOLUTION RESPIRATORY (INHALATION) at 08:33

## 2022-12-23 ASSESSMENT — ACTIVITIES OF DAILY LIVING (ADL)
ADLS_ACUITY_SCORE: 51
ADLS_ACUITY_SCORE: 47
ADLS_ACUITY_SCORE: 47
ADLS_ACUITY_SCORE: 51
ADLS_ACUITY_SCORE: 51
ADLS_ACUITY_SCORE: 47
ADLS_ACUITY_SCORE: 47
ADLS_ACUITY_SCORE: 51
ADLS_ACUITY_SCORE: 51
ADLS_ACUITY_SCORE: 55
ADLS_ACUITY_SCORE: 47
ADLS_ACUITY_SCORE: 47

## 2022-12-23 NOTE — PROGRESS NOTES
Renal Medicine Progress Note            Assessment/Plan:     Assessment: Blanco Osborne is a 58 yo male with PMH CARRILLO cirrhosis s/p liver tx (2016), afib, HTN, CHRISTIAN, CVA and recent prolonged admission after PEA arrest s/p trach/PEG re-admitted 12/16/2022 with hematochezia and L hemothorax s/p CT.     Anuric LORETTA requiring dialysis    Due to PEA arrest. CRRT discontinued 11/26, IHD started 11/29, remains on MWF schedule.   -HD today, continue MWF schedule  - no dialysis planned for over weekend, please page if acute needs arise.    Anemia of renal disease  Hematochezia, resolved    - epo 10K with dialysis   - GI consulted for poss GIB, hemoglobin stable no scope necessary.    Chronic hypotension  Possibly due to cirrhosis   - midodrine 10 mg TID     Acute on chronic hypoxic respiratory failure   S/p tracheostomy 11/29/22  Hemopneumothorax s/p L CT   H/o aspergillus PNA and multiple bacterial PNAs during last admission. Recent hemopneumothorax s/p L CT necessitating re-admission. Mucus plugging event 12/20 resulting in brief PEA arrest and severe hypoxia.       HHV 6 meningoencephalitis  Seizures  LUE focal weakness  Initial seizure after PEA arrest 12/20. Another seizure 12/21 during dialysis run after episode of mild hypotension (no hypoxia noted). Neurology following, LP showing HHV-6.  Initially acyclovir started switch to ganciclovir IV.  -Renally dose meds    CARRILLO cirrhosis s/p liver tx  Immunosuppression per GI.          Interval History:     - L CT removed due to low output   - patient reports some shortness of breath today  - much more awake and alert   - seen at the beginning of dialysis, increased UF goal due to SOB   - pararcentesis yesterday with 4 L removed           Medications and Allergies:       - MEDICATION INSTRUCTIONS for Dialysis Patients -   Does not apply See Admin Instructions     sodium chloride 0.9%  300 mL Hemodialysis Machine Once     sodium chloride (PF) 0.9%  10 mL Intracatheter During  Dialysis/CRRT (from stock)     sodium chloride (PF) 0.9%  10 mL Intracatheter During Dialysis/CRRT (from stock)     B and C vitamin Complex with folic acid  5 mL Per Feeding Tube Daily     carboxymethylcellulose PF  1 drop Both Eyes TID     chlorhexidine  15 mL Mouth/Throat Q12H     epoetin mirta-epbx  10,000 Units Subcutaneous Once per day on Mon Wed Fri     folic acid  1 mg Intravenous Daily     ganciclovir (CYTOVENE) intermittent infusion  1.25 mg/kg Intravenous Q Mon Wed Fri AM     hydrocortisone sodium succinate PF  25 mg Intravenous Q6H     insulin aspart  1-6 Units Subcutaneous Q4H     [Auto Hold] insulin glargine  30 Units Subcutaneous QAM AC     ipratropium - albuterol 0.5 mg/2.5 mg/3 mL  3 mL Nebulization 4x daily     lacosamide (VIMPAT) intermittent infusion  100 mg Intravenous BID     lactulose  20 g Oral or Feeding Tube TID     levETIRAcetam  1,000 mg Intravenous Q24H     midodrine  10 mg Oral or Feeding Tube TID w/meals     multivitamins w/minerals  15 mL Per Feeding Tube Daily     - MEDICATION INSTRUCTIONS -   Does not apply Once     nystatin  500,000 Units Swish & Swallow 4x Daily     pantoprazole  40 mg Intravenous BID 09 12     rifaximin  550 mg Per Feeding Tube BID     tacrolimus  1 mg Oral or Feeding Tube BID      No Known Allergies         Physical Exam:   Vitals were reviewed  BP (!) 148/74 (BP Location: Left arm)   Pulse 106   Temp 98.6  F (37  C) (Oral)   Resp 16   Wt 96.8 kg (213 lb 6.5 oz)   SpO2 100%   BMI 28.94 kg/m      Wt Readings from Last 3 Encounters:   12/23/22 96.8 kg (213 lb 6.5 oz)   12/16/22 100.2 kg (221 lb)   12/16/22 100.2 kg (221 lb)       Intake/Output Summary (Last 24 hours) at 12/20/2022 1146  Last data filed at 12/20/2022 1000  Gross per 24 hour   Intake 2360 ml   Output 1306 ml   Net 1054 ml       GENERAL APPEARANCE: trached  HEENT:  MMM  RESP: intermittent crackles  CV: RRR  ABDOMEN: Distended but soft and nontender.    EXTREMITIES/SKIN: trace to 1+ LE  edema  NEURO: Alert, answers yes/no questions with nodding, follows commands  LINES: L CT, RIJ TDC clean and intact            Data:     BMP  Recent Labs   Lab 12/23/22  1212 12/23/22  1204 12/23/22  0901 12/23/22  0401 12/23/22  0400 12/22/22  1612 12/22/22  1108 12/21/22  0406 12/21/22  0405 12/20/22  0432 12/20/22  0426   NA  --   --   --  135  --   --  133  --  130*  --  131*   POTASSIUM 3.6  --   --  3.3*  --   --  3.3*  --  3.8  --  3.8   CHLORIDE  --   --   --  101  --   --  99  --  95  --  96   KELLY  --   --   --  7.4*  --   --  7.6*  --  7.5*  --  7.6*   CO2  --   --   --  26  --   --  26  --  24  --  28   BUN  --   --   --  48*  --   --  44*  --  55*  --  50*   CR  --   --   --  2.83*  --   --  2.45*  --  2.66*  --  2.22*   GLC  --  151* 197* 205* 190*   < > 193*   < > 120*   < > 129*    < > = values in this interval not displayed.     CBC  Recent Labs   Lab 12/23/22  0401 12/22/22  1108 12/21/22  1315 12/21/22  0405   WBC 5.3 5.5 3.4* 4.4   HGB 8.2* 8.5* 8.0* 8.1*   HCT 26.6* 26.5* 26.2* 26.6*   * 99 102* 101*   PLT 76* 72* 75* 82*     Lab Results   Component Value Date    AST 14 12/23/2022    ALT 17 12/23/2022    ALKPHOS 198 (H) 12/23/2022    BILITOTAL 0.6 12/23/2022    CHANDLER 32 12/20/2022     Lab Results   Component Value Date    INR 1.36 (H) 12/21/2022       Attestation:  I have reviewed today's vital signs, notes, medications, labs and imaging.    Dwayne Laboy MD  [facility] Nephrology  Office: [phone]

## 2022-12-23 NOTE — PROGRESS NOTES
[facility]    Stroke Progress Note    Interval Events- NAEON    HPI Summary  Blanco sOborne is a 58 YO M w/vascular RFs: afib with AC currently held in setting of hemothorax with chest tubes, CHRISTIAN on BiPAP and PMHx of liver failure 2/2 EtOH use s/p hepatic transplant on tacrolimus, s/p trach/PEG following recent hospitalization at OSH for AHRF 2/2 strep PNA c/b PEA and septic shock with multiorgan failure/injury.     He was readmitted to OSH for acute GIB and L hemothorax requiring chest tube at OSH. After blood transfusion he was transferred to [facility] on 12/16.      On 12/19 had brief PEA arrest (difficult to ventilate, hypoxic, bradycardic), felt 2/2 to primary pulmonary (mucous plugging). Following PEA (about 20 min) he had witnessed GTC lasting about 5 min and resolving with 2mg IV lorazepam. Keppra 2 gm load subsequently given.     12/21: Acute MS change with focal motor movements in mouth, electrographic correlate of x2 GTCs. Seizures happened following dialysis - possible that dialysis elimination of keppra contributed to seizures.     12/22: LP +HHV6    Stroke Evaluation Summarized    MRI/Head CT 1. Scattered tiny recent ischemic infarcts in the cerebral hemispheres  bilaterally consistent with embolic infarcts from a central embolic  source.  2. Diffuse cerebral volume loss and cerebral white matter changes  consistent with chronic small vessel ischemic disease.      Intracranial Vasculature NA   Cervical Vasculature NA     Echocardiogram Normal left ventricular wall thickness.  Left ventricular systolic function is normal.  The visual ejection fraction is estimated at 70-75%.  Left ventricular diastolic function is normal.  No regional wall motion abnormalities noted.  The right ventricle is normal in size and function.  A contrast injection (Bubble Study) was performed that was negative for flow  across the interatrial septum.  No significant valve disease.  Right ventricular  systolic pressure could not be approximated due to  inadequate tricuspid regurgitation.  The inferior vena cava is normal.   EKG/Telemetry Sinus rhythm   Other Testing Not Applicable      LDL  12/21/2022: 41 mg/dL   A1C  11/19/2022: 4.7 %   Troponin No lab value available in past 48 hrs       Impression/Plan  # HHV6 meningoencephalitis c/b encephalopathy, seizures.  # Multiple electro-clinical generalized seizures (focal mouth movements with clear change in mental status) on 12/21. These new breakthrough seizures were post dialysis - may be related keppra removal vs electrolyte shifts. LP with 58/3/71/69. ME panel was +HHV6, which in immunocompromised pt, especially one with encephalopathy/new onset seizures, likely represents etiology for symptoms. CSF WBC may be spuriously suppressed 2/2 immunosuppression from anti-rejection medications and symptomatic HHV6 reactivation in critically ill, immunocompromised pt is very possible. On exam, pt much more alert today, following commands, moving all extremities although ongoing focal LUE paresis. BLE > BUE as well.   - cEEG stopped 12/23 - 48 hours seizure free  - Cont keppra 1 gm daily (renally dosed, dialysis dosed) - appreciate Pharmacy help  - 12/21: Initiated  mg load with 100 mg BID dosing for new break through seizures post dialysis   - Recommend ID consult, agree with treating HHV6 with specific targeted antiviral therapies for above reasons     - Will need Pharmacy help to renally dose     # Multifocal small acute ischemic strokes. MR with small strokes in L temporal, R parietal, and L parietal lobes - suspicious for embolic etiology. Suspect 2/2 to cardioembolism from known afib, critically ill state. AC has been held for acute blood loss anemia, hemothorax. TTE reviewed above. A1c 4.7 on 11/19. LDL pending. Small strokes would not be expected to be primary factor in ongoing encephalopathy. Imaging also does not explain focal LUE weakness.   - MRA H/N  "non-urgently/when more stable to complete workup  - Resume PTA AC when GI gives him clearance and stable from hemothorax perspective for secondary stroke prevention (known afib previously on AC)  - If acute blood loss anemia is ongoing concern, can consider bridging with ASA 81 mg until he is stable enough for AC     Patient Follow-up    - final recommendation pending work-up    We will continue to follow.     The Stroke Staff is Dr. Shetty.    Maurice Benjamin MD  Vascular Neurology Fellow  To page me or covering stroke neurology team member, click here: AMCOM   Choose \"On Call\" tab at top, then search dropdown box for \"Neurology Adult\", select location, press Enter, then look for stroke/neuro ICU/telestroke.    ______________________________________________________    Clinically Significant Risk Factors        # Hypokalemia: Lowest K = 3.3 mmol/L in last 2 days, will replace as needed  # Hyponatremia: Lowest Na = 133 mmol/L in last 2 days, will monitor as appropriate      # Hypoalbuminemia: Lowest albumin = 1.7 g/dL at 12/16/2022 10:39 PM, will monitor as appropriate   # Thrombocytopenia: Lowest platelets = 72 in last 2 days, will monitor for bleeding          # Severe Malnutrition: based on nutrition assessment          Medications   Scheduled Meds    - MEDICATION INSTRUCTIONS for Dialysis Patients -   Does not apply See Admin Instructions     sodium chloride 0.9%  300 mL Hemodialysis Machine Once     sodium chloride (PF) 0.9%  10 mL Intracatheter During Dialysis/CRRT (from stock)     sodium chloride (PF) 0.9%  10 mL Intracatheter During Dialysis/CRRT (from stock)     B and C vitamin Complex with folic acid  5 mL Per Feeding Tube Daily     carboxymethylcellulose PF  1 drop Both Eyes TID     chlorhexidine  15 mL Mouth/Throat Q12H     epoetin mirta-epbx  10,000 Units Subcutaneous Once per day on Mon Wed Fri     folic acid  1 mg Intravenous Daily     ganciclovir (CYTOVENE) intermittent infusion  1.25 mg/kg Intravenous Q " Mon Wed Fri AM     hydrocortisone sodium succinate PF  25 mg Intravenous Q6H     insulin aspart  1-6 Units Subcutaneous Q4H     [Auto Hold] insulin glargine  30 Units Subcutaneous QAM AC     ipratropium - albuterol 0.5 mg/2.5 mg/3 mL  3 mL Nebulization 4x daily     lacosamide (VIMPAT) intermittent infusion  100 mg Intravenous BID     lactulose  20 g Oral or Feeding Tube TID     levETIRAcetam  1,000 mg Intravenous Q24H     midodrine  10 mg Oral or Feeding Tube TID w/meals     multivitamins w/minerals  15 mL Per Feeding Tube Daily     - MEDICATION INSTRUCTIONS -   Does not apply Once     nystatin  500,000 Units Swish & Swallow 4x Daily     pantoprazole  40 mg Intravenous BID 09 12     rifaximin  550 mg Per Feeding Tube BID     tacrolimus  1 mg Oral or Feeding Tube BID       Infusion Meds    dextrose       norepinephrine Stopped (12/21/22 1037)     propofol Stopped (12/20/22 1719)     vasopressin Stopped (12/21/22 1147)       PRN Meds  sodium chloride 0.9%, albuterol, dextrose, glucose **OR** dextrose **OR** glucagon, naloxone **OR** naloxone **OR** naloxone **OR** naloxone, ondansetron, oxyCODONE       PHYSICAL EXAMINATION  Temp:  [97.4  F (36.3  C)-98  F (36.7  C)] 97.7  F (36.5  C)  Pulse:  [69-98] 85  Resp:  [14-37] 15  BP: (148)/(74) 148/74  MAP:  [77 mmHg-107 mmHg] 89 mmHg  Arterial Line BP: (113-153)/(54-79) 144/76  FiO2 (%):  [25 %-30 %] 25 %  SpO2:  [96 %-100 %] 100 %      Neuro Exam  MS: E2, trached, M6, follows most commands (opens mouth, squeezes hands)  CN: Pupils 3 mm and reactive bilat, improved visual tracking today, inability to count fingers, grimace to pain is symmetric  Motor: Is not antigravity in any limb, but LUE appears weaker vs RUE, and BLE weaker than BUE  Sensory: Senses pain in all 4 limbs    Stroke Scales    NIHSS  1a. Level of Consciousness 0-->Alert, keenly responsive   1b. LOC Questions 2-->Answers neither question correctly   1c. LOC Commands 0-->Performs both tasks correctly   2.    Best Gaze 0-->Normal   3.   Visual 0-->No visual loss   4.   Facial Palsy 0-->Normal symmetrical movements   5a. Motor Arm, Left 3-->No effort against gravity, limb falls   5b. Motor Arm, Right 3-->No effort against gravity, limb falls   6a. Motor Leg, Left 3-->No effort against gravity, leg falls to bed immediately   6b. Motor Leg, right 3-->No effort against gravity, leg falls to bed immediately   7.   Limb Ataxia 0-->Absent   8.   Sensory 0-->Normal, no sensory loss   9.   Best Language 1-->Mild-to-moderate aphasia, some obvious loss of fluency or facility of comprehension, without significant limitation on ideas expressed or form of expression. Reduction of speech and/or comprehension, however, makes conversation. . . (see row details)   10. Dysarthria (UN) Intubated or other physical barrier   11. Extinction and Inattention  0-->No abnormality   Total 15 (12/21/22 1301)       Modified Wilmot Score (Pre-morbid)  5 - Severe disability.  Requires constant nursing care and attention, bedridden.    Imaging  I personally reviewed all imaging; relevant findings per HPI.     Lab Results Data   CBC  Recent Labs   Lab 12/23/22  0401 12/22/22  1108 12/21/22  1315   WBC 5.3 5.5 3.4*   RBC 2.57* 2.67* 2.58*   HGB 8.2* 8.5* 8.0*   HCT 26.6* 26.5* 26.2*   PLT 76* 72* 75*     Basic Metabolic Panel    Recent Labs   Lab 12/23/22  1204 12/23/22  0901 12/23/22  0401 12/22/22  1612 12/22/22  1108 12/21/22  0406 12/21/22  0405   NA  --   --  135  --  133  --  130*   POTASSIUM  --   --  3.3*  --  3.3*  --  3.8   CHLORIDE  --   --  101  --  99  --  95   CO2  --   --  26  --  26  --  24   BUN  --   --  48*  --  44*  --  55*   CR  --   --  2.83*  --  2.45*  --  2.66*   * 197* 205*   < > 193*   < > 120*   KELLY  --   --  7.4*  --  7.6*  --  7.5*    < > = values in this interval not displayed.     Liver Panel  Recent Labs   Lab 12/23/22  0401 12/22/22  1652 12/22/22  1108 12/21/22  0405   PROTTOTAL 4.6*  --  4.9* 4.7*   ALBUMIN 1.9*  2.1* 2.1* 2.0*  1.9*   BILITOTAL 0.6  --  0.8 1.1   ALKPHOS 198*  --  163* 133   AST 14  --  16 20   ALT 17  --  17 17     INR    Recent Labs   Lab Test 12/21/22  1315 12/16/22  2239 12/16/22  1620   INR 1.36* 1.14 1.58*      Lipid Profile    Recent Labs   Lab Test 12/21/22  1315 11/20/22  1151 04/20/20  1157   CHOL 89 121 161   HDL 27* 22* 42   LDL 41 80 81   TRIG 106 94 192*     A1C    Recent Labs   Lab Test 11/19/22  0212 09/22/16  0741   A1C 4.7 4.8     Troponin  No results for input(s): CTROPT, TROPONINIS, TROPONINI, GHTROP in the last 168 hours.       Data

## 2022-12-23 NOTE — PROGRESS NOTES
Comprehensive Daily ICU Note        Blanco Osborne MRN# [id number]   Age: 57 year old YOB: 1965     Date of Admission: 12/16/2022    Primary care provider: Ki Powers     CODE STATUS: FULL      Problem List:         Principal Problem:    Pneumothorax on left  Active Problems:    Cirrhosis of liver (H)    Liver transplanted (H)    Acute kidney injury (H)    Embolic stroke (H)    Encephalopathy    Chronic kidney disease, stage V (H)    Cardiac arrest (H)    Seizures (H)    Human herpesvirus 6 viremia             Treatment goals for next 24 hours:   Supportive care    Laura Fernandes MD        Subjective/ Last 24 hours:   Events: None. Alert today.           Mechanical Ventilation/Vitalsigns/IsandOs:       Temp:  [97.5  F (36.4  C)-98.6  F (37  C)] 98  F (36.7  C)  Pulse:  [] 98  Resp:  [7-37] 7  BP: (148)/(74) 148/74  MAP:  [77 mmHg-113 mmHg] 97 mmHg  Arterial Line BP: (113-160)/(54-85) 147/70  FiO2 (%):  [25 %-30 %] 25 %  SpO2:  [96 %-100 %] 100 %      Vent Mode: CMV/AC  (Continuous Mandatory Ventilation/ Assist Control)  FiO2 (%): 25 %  Resp Rate (Set): 16 breaths/min  Tidal Volume (Set, mL): 450 mL  PEEP (cm H2O): 5 cmH2O  Resp: (!) 7        Intake/Output Summary (Last 24 hours) at 12/23/2022 1655  Last data filed at 12/23/2022 1600  Gross per 24 hour   Intake 1260 ml   Output 4550 ml   Net -3290 ml     Net IO Since Admission: 2,725.83 mL [12/23/22 1655]             Physical Examination:     General: Stated age, chronically ill  HEENT: Healing tracheostomy site  Lungs: Lungs are clear  CVS: Regular rate rhythm  Abdomen: Clean PEG site protuberant but not tense nontender  Extremities/musculoskeletal: Cachectic  Neurology: Patient very awake and alert  Skin: Normal  Psychiatry: Calm  Exam of Line sites: Clean           Feeding/Glucose:     Orders Placed This Encounter      NPO for Medical/Clinical Reasons Except for: Ice Chips, Meds        Recent Labs   Lab 12/23/22  1511 12/23/22  1204  12/23/22  0901 12/23/22  0401 12/23/22  0400 12/23/22  0059   * 151* 197* 205* 190* 193*                Medications:       - MEDICATION INSTRUCTIONS for Dialysis Patients -   Does not apply See Admin Instructions     sodium chloride 0.9%  300 mL Hemodialysis Machine Once     sodium chloride (PF) 0.9%  10 mL Intracatheter During Dialysis/CRRT (from stock)     sodium chloride (PF) 0.9%  10 mL Intracatheter During Dialysis/CRRT (from stock)     B and C vitamin Complex with folic acid  5 mL Per Feeding Tube Daily     carboxymethylcellulose PF  1 drop Both Eyes TID     chlorhexidine  15 mL Mouth/Throat Q12H     epoetin mirta-epbx  10,000 Units Subcutaneous Once per day on Mon Wed Fri     folic acid  1 mg Intravenous Daily     ganciclovir (CYTOVENE) intermittent infusion  1.25 mg/kg Intravenous Q Mon Wed Fri AM     hydrocortisone sodium succinate PF  25 mg Intravenous Q6H     insulin aspart  1-6 Units Subcutaneous Q4H     [Auto Hold] insulin glargine  30 Units Subcutaneous QAM AC     ipratropium - albuterol 0.5 mg/2.5 mg/3 mL  3 mL Nebulization 4x daily     lacosamide (VIMPAT) intermittent infusion  100 mg Intravenous BID     lactulose  20 g Oral or Feeding Tube TID     levETIRAcetam  1,000 mg Intravenous Q24H     midodrine  10 mg Oral or Feeding Tube TID w/meals     multivitamins w/minerals  15 mL Per Feeding Tube Daily     nystatin  500,000 Units Swish & Swallow 4x Daily     pantoprazole  40 mg Intravenous BID 09 12     rifaximin  550 mg Per Feeding Tube BID     tacrolimus  1 mg Oral or Feeding Tube BID          dextrose       norepinephrine Stopped (12/21/22 1037)     propofol Stopped (12/20/22 1719)     vasopressin Stopped (12/21/22 1147)              Labs:         ROUTINE ICU LABS (Last four results)  CMP  Recent Labs   Lab 12/23/22  1511 12/23/22  1212 12/23/22  1204 12/23/22  0901 12/23/22  0401 12/22/22  2038 12/22/22  1652 12/22/22  1612 12/22/22  1108 12/21/22  0406 12/21/22  0405 12/20/22  0432  12/20/22  0426 12/19/22  0940 12/19/22  0624 12/17/22  0253 12/16/22  2239   NA  --   --   --   --  135  --   --   --  133  --  130*  --  131*  --  131*  --  135   POTASSIUM  --  3.6  --   --  3.3*  --   --   --  3.3*  --  3.8  --  3.8  --  4.4  --  4.0   CHLORIDE  --   --   --   --  101  --   --   --  99  --  95  --  96  --  94  --  99   CO2  --   --   --   --  26  --   --   --  26  --  24  --  28  --  26  --  26   ANIONGAP  --   --   --   --  8  --   --   --  8  --  11  --  7  --  11  --  10   *  --  151* 197* 205*   < >  --    < > 193*   < > 120*   < > 129*   < > 133*   < > 104*   BUN  --   --   --   --  48*  --   --   --  44*  --  55*  --  50*  --  75*  --  114*   CR  --   --   --   --  2.83*  --   --   --  2.45*  --  2.66*  --  2.22*  --  3.06*  --  3.35*   GFRESTIMATED  --   --   --   --  25*  --   --   --  30*  --  27*  --  34*  --  23*  --  20*   KELLY  --   --   --   --  7.4*  --   --   --  7.6*  --  7.5*  --  7.6*  --  7.8*  --  8.0*   MAG  --   --   --   --  1.8  --   --   --   --   --   --   --   --   --   --   --  2.2   PHOS  --   --   --   --  4.3  --   --   --  4.4  --  5.8*  --   --   --  5.7*  --  4.4   PROTTOTAL  --   --   --   --  4.6*  --   --   --  4.9*  --  4.7*  --  5.0*  --   --   --  5.1*   ALBUMIN  --   --   --   --  1.9*  --  2.1*  --  2.1*  --  2.0*  1.9*  --  2.0*  --  2.1*  1.9*  --  1.7*   BILITOTAL  --   --   --   --  0.6  --   --   --  0.8  --  1.1  --  1.8*  --   --   --  0.9   ALKPHOS  --   --   --   --  198*  --   --   --  163*  --  133  --  147  --   --   --  251*   AST  --   --   --   --  14  --   --   --  16  --  20  --  20  --   --   --  21   ALT  --   --   --   --  17  --   --   --  17  --  17  --  19  --   --   --  20    < > = values in this interval not displayed.     CBC  Recent Labs   Lab 12/23/22  0401 12/22/22  1108 12/21/22  1315 12/21/22  0405   WBC 5.3 5.5 3.4* 4.4   RBC 2.57* 2.67* 2.58* 2.63*   HGB 8.2* 8.5* 8.0* 8.1*   HCT 26.6* 26.5* 26.2* 26.6*   *  99 102* 101*   MCH 31.9 31.8 31.0 30.8   MCHC 30.8* 32.1 30.5* 30.5*   RDW 17.9* 18.2* 17.8* 17.7*   PLT 76* 72* 75* 82*     INR  Recent Labs   Lab 12/21/22  1315 12/16/22  2239   INR 1.36* 1.14     Arterial Blood Gas  Recent Labs   Lab 12/20/22  0719 12/17/22  1136   PH 7.43  --    PCO2 40  --    PO2 118*  --    HCO3 27  --    O2PER 30 30           Cultures:      Recent Labs   Lab 12/22/22  1318 12/21/22  1526 12/17/22  0400 12/16/22  2312   CULTURE No growth, less than 1 day No growth after 1 day  No growth after 1 day Culture in progress  Isolated in broth only Yeast* 2+ Staphylococcus epidermidis*  2+ Candida parapsilosis complex*             Imaging/Other results:     No results found for this or any previous visit (from the past 24 hour(s)).                    Assessment and Plan:     57 year old male with paroxysmal A. fib, Liver transplant (2016), CHRISTIAN on BiPAP, h/o CVA and hypertension. Patient  s/p Trach/PEG following acu had Rt sided Chest tube placed for hydroptx on 12/12/22 and  transferred to [place] on 12/14/22 for CIP.  Pt  noted to have bloody stool on 12/15 and 12/16 and had left Ptx which required left sided chest tube leading to ~900ml of bloody output. Patient with PEA arrest 12/20/22 and now with seizure while on HD.  Appears to be now getting  to his most recent baseline.    Neurology and Psychiatry:  Seizure: Acute mental status change with focal motor movements of mouth and EEG correlate X 2.  Also baseline multifactorial encephalopathy secondary to his ongoing critical illness and prior cardiac arrest..  - Neuro ICU following-reloaded with Keppra after resolution of above with IV Ativan  - MRI head from 12/20/22 -No PRES or leptomeningeal enhancement  - Prior oral Herpes (still on topical) and now concern for HSV encephalitis. LP 12/22/22- Only +ve for HHV6  1. PEA arrest/? Mucus plug- not awake-CT head without acute process  2. H/o Embolic CVA: Elliquis is held secondary to  presumed GI bleed  Plan:  1. Acyclovir changed to Ganciclovir  2. EEG-onoing  3. Keppra loaded  4. Neuro ICU following  5.  Awaiting send out for HHV-6      Pulmonary:  PEA/respiratory arrest prior to admission. Bronchoscopy 12/20/22- Unrevealing for mucus plugs or ANY secretions. S/P Trach (11/29) on vent-failed extubation X 2. New Left Ptx with acute on hypoxic respiratory failure and ? hemithorax: Etiology unclear. Pigtail placed on 12/16/2022 and significant decrease in volume of left pneumothorax. Hx of right hydropneumothorax: No output nor air leak. Removed 12/19/22 per CT surgery  Plan:  1.  Transition back to pressure support when possible  2. Continue L chest tube to suction      Cardiovascular system:  PEA arrest again this admission.  Has had a prior in the context of respiratory arrest at the beginning of his previous admission.  No structural cardiac disease but does have A. Fib.  Anticoagulation been on hold because of concern for possible GI bleeding.  Plan:  1 resume anticoagulation tomorrow if hemoglobin stable.  E     Renal/Electrolytes:  1. LORETTA: was on CRRT but now on iHD.  No return of renal function.  - MWF schedule- had HD session 12/19/22- tolerated UF       ID:  LP 12/21/22: HHV6 qualitative +ve   - HSV 1 + 2 negatvie, cryptococcus negative  1. H/o Strep/Parainfluenza infection: Completed treatment course  2. 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  ID thought it was was less likely to be clinically significant.  3. 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 Ganciclovir IV-dosing for IHD  2. Discontinue Acyclovir  3. Follow cxs      GI/:  1. Possible GI bleed: No clear evidence ongoing Apparently had bloody stool overnight on 12/17. GI team suspicious of ? iatrogenic rectal ulcer- no   2.  Post transplant cirrhosis.   Discussed with hepatologist at [place] who felt that most likely reason for the cirrhosis was metabolic syndrome or alcohol intake.  There was no evidence of rejection.  Plan:  1.  Continue medical management as for patients with cirrhosis.  2.  Monitor for GI bleeding    Endocrine:  1. DM.  Also now on steroids while he is in the hospital.  Plan:  -ICU insulin protocol.      Nutrition:  Resume TF.    Heme/Onc:  1.  Pancytopenia; possibly related to cirrhosis. Chronic splenomegaly-present prior to liver tx but has not regressed. Prior CT chest with evidence of subclavicular LN  Indolent blood dyscrasia  2.  Should be on anticoagulation to prevent embolic strokes.  This had been held.  Plan:  1.  Monitor.  Resume anticoagulation.  Note peripheral smear has been ordered we will look for results of this.    ICU prophylaxis:      DVT; subcutaneous heparin     VAP; As above     Restraints needed: No     Stress ulcer: IV PPI     Central line: Needed today for IV access/Meds.    Code Status: Full    Prognosis: Extremely guarded  Family update: Not by me today  CC Time: 35 minutes    Laura Fernandes MD

## 2022-12-23 NOTE — PROGRESS NOTES
[facility] ICU RESPIRATORY NOTE        Date of Admission: 12/16/2022    Date of Intubation (most recent): Chronic trach    Reason for Mechanical Ventilation: Resp Failure    Number of Days on Mechanical Ventilation: 7    Met Criteria for Spontaneous Breathing Trial: No    Reason for No Spontaneous Breathing Trial: Per MD    Significant Events Today: none    ABG Results:   Recent Labs   Lab 12/20/22  0719 12/17/22  1136   PH 7.43  --    PCO2 40  --    PO2 118*  --    HCO3 27  --    O2PER 30 30       Current Vent Settings: Vent Mode: CMV/AC  (Continuous Mandatory Ventilation/ Assist Control)  FiO2 (%): 30 %  Resp Rate (Set): 16 breaths/min  Tidal Volume (Set, mL): 450 mL  PEEP (cm H2O): 5 cmH2O  Resp: 14      Skin Assessment: red blanchable site around trach    Plan: Continue full vent support; reassess daily for SBT.    Erica Garcia, RT on 12/22/2022 at 6:05 PM

## 2022-12-23 NOTE — PROGRESS NOTES
GASTROENTEROLOGY PROGRESS NOTE    CC:     SUBJECTIVE:  No abd pain today. Thinks paracentesis yesterday helped pain    OBJECTIVE:  General Appearance:  Awake alert NAD  /71   Pulse 74   Temp 97.7  F (36.5  C) (Oral)   Resp 16   Wt 96.8 kg (213 lb 6.5 oz)   SpO2 100%   BMI 28.94 kg/m    Temp (24hrs), Av.8  F (36.6  C), Min:97.4  F (36.3  C), Max:98.2  F (36.8  C)    Patient Vitals for the past 72 hrs:   Weight   22 0600 96.8 kg (213 lb 6.5 oz)   22 0500 95.8 kg (211 lb 3.2 oz)       Intake/Output Summary (Last 24 hours) at 2022 0820  Last data filed at 2022 0600  Gross per 24 hour   Intake 1060 ml   Output 450 ml   Net 610 ml       PHYSICAL EXAM    Abd: S protuberant, not tense ND +bs        Additional Comments:  ROS, FH, SH: See initial GI consult for details.    I have reviewed the patient's new clinical lab results:    Recent Labs   Lab Test 22  0401 22  1108 22  1315 22  0519 22  2239 22  1620   WBC 5.3 5.5 3.4*   < > 3.9* 4.5   HGB 8.2* 8.5* 8.0*   < > 7.9*  7.9* 7.0*   * 99 102*   < > 106* 107*   PLT 76* 72* 75*   < > 94* 111*   INR  --   --  1.36*  --  1.14 1.58*    < > = values in this interval not displayed.     Recent Labs   Lab Test 22  0401 22  1108 22  0405   POTASSIUM 3.3* 3.3* 3.8   CHLORIDE 101 99 95   CO2 26 26 24   BUN 48* 44* 55*   ANIONGAP 8 8 11     Recent Labs   Lab Test 22  0401 22  1652 22  1108 22  0405 22  1947 22  1859 07/10/18  0720 10/31/17  1038 16  0611 16  1055 16  0720 16  0741 16  0034 16  1259   ALBUMIN 1.9* 2.1* 2.1* 2.0*  1.9*   < >  --    < >  --    < >  --    < > 2.8*   < > 3.8   BILITOTAL 0.6  --  0.8 1.1   < >  --    < >  --    < >  --    < > 2.3*   < > 6.8*   ALT 17  --  17 17   < >  --    < >  --    < >  --    < > 726*   < > 26   AST 14  --  16 20   < >  --    < >  --    < >  --    < > 570*   < > 32    PROTEIN  --   --   --   --   --  70*  --  Negative  --  Negative   < >  --   --   --    LIPASE  --   --   --   --   --   --   --   --   --   --   --  63*  --   --    AMYLASE  --   --   --   --   --   --   --   --   --   --   --  72  --  86    < > = values in this interval not displayed.         Principal Problem:   57 yom with hx of OLT with recurrent cirrhosis with portal HTN. Complicated ICU course with HHV6 infection, ira chest tubes, seizure, PEA arrest, Oliguria LORETTA,  Anemia. GI issues stable  -Hepatic encephalopathy: continue current dose of lactulose, appears to be stooling adequately  -Anemia: brown heme pos stool. No sign of active bleeding. Continue PPI. No need for endoscopic procedure at this time.   -Post OLT: immunosuppression per [facility]  -Ascites: PRN paracentesis, no diuretics  -Continue Midodrine  -Aggressive nutritional support    Will sign off. Call with questions.         Halina Mccrary MD  [facility]  Pager: [phone]  Office: [phone]

## 2022-12-23 NOTE — PROGRESS NOTES
[facility] ICU RESPIRATORY NOTE        Date of Admission: 12/16/2022    Date of Intubation (most recent): Chronic Trach    Reason for Mechanical Ventilation: Resp Failure    Number of Days on Mechanical Ventilation: 8    Met Criteria for Spontaneous Breathing Trial: No    Reason for No Spontaneous Breathing Trial: Per MD    Significant Events Today: None overnight    ABG Results:   Recent Labs   Lab 12/20/22  0719 12/17/22  1136   PH 7.43  --    PCO2 40  --    PO2 118*  --    HCO3 27  --    O2PER 30 30       Current Vent Settings: Vent Mode: CMV/AC  (Continuous Mandatory Ventilation/ Assist Control)  FiO2 (%): 25 %  Resp Rate (Set): 16 breaths/min  Tidal Volume (Set, mL): 450 mL  PEEP (cm H2O): 5 cmH2O  Resp: 18      Skin Assessment: Peristomal site area with red blanchable coloration    Plan: Continue full vent support and reassess daily for SBT readiness    Guido Mclain, RT on 12/23/2022 at 5:27 AM

## 2022-12-23 NOTE — PROGRESS NOTES
Potassium   Date Value Ref Range Status   12/23/2022 3.6 3.4 - 5.3 mmol/L Final   04/20/2020 3.9 3.4 - 5.3 mmol/L Final     Hemoglobin   Date Value Ref Range Status   12/23/2022 8.2 (L) 13.3 - 17.7 g/dL Final   04/20/2020 14.3 13.3 - 17.7 g/dL Final     Creatinine   Date Value Ref Range Status   12/23/2022 2.83 (H) 0.66 - 1.25 mg/dL Final   04/20/2020 1.06 0.66 - 1.25 mg/dL Final     Urea Nitrogen   Date Value Ref Range Status   12/23/2022 48 (H) 7 - 30 mg/dL Final   04/20/2020 23 7 - 30 mg/dL Final     Sodium   Date Value Ref Range Status   12/23/2022 135 133 - 144 mmol/L Final   04/20/2020 139 133 - 144 mmol/L Final     INR   Date Value Ref Range Status   12/21/2022 1.36 (H) 0.85 - 1.15 Final   10/07/2019 1.20 (H) 0.86 - 1.14 Final      Latest Reference Range & Units 12/10/22 20:22   Hep B Surface Agn Nonreactive  Nonreactive   Hepatitis B Surface Antibody Instrument Value <8.00 m[IU]/mL 0.00   Hepatitis B Surface Antibody  Nonreactive     DIALYSIS PROCEDURE NOTE  Hepatitis status of previous patient on machine log was checked and verified ok to use with this patients hepatitis status.  Patient dialyzed for 3 hrs. on a K4 bath with a net fluid removal of  2L.  A BFR of 400 ml/min was obtained via a CVC  The treatment plan was discussed with   during the treatment.    Total heparin received during the treatment: 0 units.   .   Line flushed, clamped and capped with heparin 1:1000 1.9 mL (1900 units) per lumen    Meds  given: 250 mL albumin, 40098 units epogen  Complications: none    Person educated: verbal. Knowledge base minimal. Barriers to learning: trach. Educated on procedural via verbal mode.   ICEBOAT? Timeout performed pre-treatment  I: Patient was identified using 2 identifiers  C:  Consent Signed Yes  E: Equipment preventative maintenance is current and dialysis delivery system OK to use    O: Dialysis orders present and complete prior to treatment  A: Vascular access verified and assessed prior to  treatment  T: Treatment was performed at a clinically appropriate time  ?: Patient was allowed to ask questions and address concerns prior to treatment  See Adult Hemodialysis flowsheet in EPIC for further details and post assessment.  Machine water alarm in place and functioning. Transducer pods intact and checked every 15min.   Pt dialyzed in bed: ICU  Chlorine/Chloramine water system checked every 4 hours.      Patient repositioned every 2 hours during the treatment.  Post treatment report given to JACKIE Louis RN regarding 2L of fluid removed, last BP of 157/77, and patient pain rating of 0/10.

## 2022-12-23 NOTE — PLAN OF CARE
Goal Outcome Evaluation:     Full vent support. Tolerated dialysis. Up to chair with lift. SR.

## 2022-12-23 NOTE — PLAN OF CARE
Goal Outcome Evaluation:        Neuro: A&O. Follows commands, slowly at times. Flat affect. SOTO. RUE>LUE.   CV: SR. Afebrile. BP WDL.   Resp: Coarse lung sounds closer to AM. , R16, 30%, P5. Scant secretions.  GI: Rectal tube in place - skin reddened but intact. Put out ~300mL stool. Abdomen distended.  : Anuric. HD planned today.   Skin: See flowsheets. Dressings changed per plan of care.

## 2022-12-24 ENCOUNTER — APPOINTMENT (OUTPATIENT)
Dept: GENERAL RADIOLOGY | Facility: CLINIC | Age: 57
DRG: 207 | End: 2022-12-24
Attending: INTERNAL MEDICINE
Payer: COMMERCIAL

## 2022-12-24 LAB
ALB CSF/SERPL: 23.7 RATIO
ALBUMIN CSF-MCNC: 45 MG/DL
ALBUMIN SERPL-MCNC: 1901 MG/DL
ALBUMIN SERPL-MCNC: 2.5 G/DL (ref 3.4–5)
ANION GAP SERPL CALCULATED.3IONS-SCNC: 10 MMOL/L (ref 3–14)
BUN SERPL-MCNC: 37 MG/DL (ref 7–30)
CALCIUM SERPL-MCNC: 8.7 MG/DL (ref 8.5–10.1)
CHLORIDE BLD-SCNC: 102 MMOL/L (ref 94–109)
CO2 SERPL-SCNC: 25 MMOL/L (ref 20–32)
CREAT SERPL-MCNC: 2.25 MG/DL (ref 0.66–1.25)
ERYTHROCYTE [DISTWIDTH] IN BLOOD BY AUTOMATED COUNT: 18 % (ref 10–15)
GFR SERPL CREATININE-BSD FRML MDRD: 33 ML/MIN/1.73M2
GLUCOSE BLD-MCNC: 228 MG/DL (ref 70–99)
GLUCOSE BLDC GLUCOMTR-MCNC: 134 MG/DL (ref 70–99)
GLUCOSE BLDC GLUCOMTR-MCNC: 163 MG/DL (ref 70–99)
GLUCOSE BLDC GLUCOMTR-MCNC: 167 MG/DL (ref 70–99)
GLUCOSE BLDC GLUCOMTR-MCNC: 209 MG/DL (ref 70–99)
GLUCOSE BLDC GLUCOMTR-MCNC: 221 MG/DL (ref 70–99)
GLUCOSE BLDC GLUCOMTR-MCNC: 223 MG/DL (ref 70–99)
HCT VFR BLD AUTO: 28.4 % (ref 40–53)
HGB BLD-MCNC: 8.4 G/DL (ref 13.3–17.7)
IGG CSF-MCNC: 10 MG/DL
IGG SERPL-MCNC: 672 MG/DL
IGG SYNTH RATE SER+CSF CALC-MRATE: 13 MG/D
IGG/ALB CLEAR SER+CSF-RTO: 0.63 RATIO
IGG/ALB CSF: 0.22 RATIO
MAGNESIUM SERPL-MCNC: 1.9 MG/DL (ref 1.6–2.3)
MCH RBC QN AUTO: 31.3 PG (ref 26.5–33)
MCHC RBC AUTO-ENTMCNC: 29.6 G/DL (ref 31.5–36.5)
MCV RBC AUTO: 106 FL (ref 78–100)
OLIGOCLONAL BANDS CSF ELPH-IMP: ABNORMAL
OLIGOCLONAL BANDS CSF ELPH-IMP: NEGATIVE
OLIGOCLONAL BANDS CSF IEF: ABNORMAL BANDS
PHOSPHATE SERPL-MCNC: 2.1 MG/DL (ref 2.5–4.5)
PLATELET # BLD AUTO: 58 10E3/UL (ref 150–450)
POTASSIUM BLD-SCNC: 3.6 MMOL/L (ref 3.4–5.3)
RBC # BLD AUTO: 2.68 10E6/UL (ref 4.4–5.9)
SODIUM SERPL-SCNC: 137 MMOL/L (ref 133–144)
UFH PPP CHRO-ACNC: 0.33 IU/ML
WBC # BLD AUTO: 5.8 10E3/UL (ref 4–11)

## 2022-12-24 PROCEDURE — 83735 ASSAY OF MAGNESIUM: CPT | Performed by: SURGERY

## 2022-12-24 PROCEDURE — 250N000011 HC RX IP 250 OP 636: Performed by: INTERNAL MEDICINE

## 2022-12-24 PROCEDURE — C9113 INJ PANTOPRAZOLE SODIUM, VIA: HCPCS | Performed by: PHYSICIAN ASSISTANT

## 2022-12-24 PROCEDURE — 250N000013 HC RX MED GY IP 250 OP 250 PS 637: Performed by: INTERNAL MEDICINE

## 2022-12-24 PROCEDURE — 85041 AUTOMATED RBC COUNT: CPT | Performed by: INTERNAL MEDICINE

## 2022-12-24 PROCEDURE — 94640 AIRWAY INHALATION TREATMENT: CPT | Mod: 76

## 2022-12-24 PROCEDURE — 250N000011 HC RX IP 250 OP 636: Performed by: SURGERY

## 2022-12-24 PROCEDURE — 250N000012 HC RX MED GY IP 250 OP 636 PS 637: Performed by: INTERNAL MEDICINE

## 2022-12-24 PROCEDURE — 94640 AIRWAY INHALATION TREATMENT: CPT

## 2022-12-24 PROCEDURE — 250N000013 HC RX MED GY IP 250 OP 250 PS 637: Performed by: SURGERY

## 2022-12-24 PROCEDURE — 999N000040 HC STATISTIC CONSULT NO CHARGE VASC ACCESS

## 2022-12-24 PROCEDURE — 94003 VENT MGMT INPAT SUBQ DAY: CPT

## 2022-12-24 PROCEDURE — C9254 INJECTION, LACOSAMIDE: HCPCS | Performed by: PSYCHIATRY & NEUROLOGY

## 2022-12-24 PROCEDURE — 200N000001 HC R&B ICU

## 2022-12-24 PROCEDURE — 250N000011 HC RX IP 250 OP 636: Performed by: PHYSICIAN ASSISTANT

## 2022-12-24 PROCEDURE — 250N000013 HC RX MED GY IP 250 OP 250 PS 637: Performed by: PHYSICIAN ASSISTANT

## 2022-12-24 PROCEDURE — 85520 HEPARIN ASSAY: CPT | Performed by: INTERNAL MEDICINE

## 2022-12-24 PROCEDURE — 999N000009 HC STATISTIC AIRWAY CARE

## 2022-12-24 PROCEDURE — 250N000009 HC RX 250: Performed by: INTERNAL MEDICINE

## 2022-12-24 PROCEDURE — 999N000157 HC STATISTIC RCP TIME EA 10 MIN

## 2022-12-24 PROCEDURE — 71045 X-RAY EXAM CHEST 1 VIEW: CPT

## 2022-12-24 PROCEDURE — 258N000003 HC RX IP 258 OP 636: Performed by: PSYCHIATRY & NEUROLOGY

## 2022-12-24 PROCEDURE — 250N000011 HC RX IP 250 OP 636: Performed by: PSYCHIATRY & NEUROLOGY

## 2022-12-24 PROCEDURE — 82040 ASSAY OF SERUM ALBUMIN: CPT | Performed by: STUDENT IN AN ORGANIZED HEALTH CARE EDUCATION/TRAINING PROGRAM

## 2022-12-24 PROCEDURE — 99291 CRITICAL CARE FIRST HOUR: CPT | Performed by: INTERNAL MEDICINE

## 2022-12-24 RX ORDER — HEPARIN SODIUM 10000 [USP'U]/100ML
0-5000 INJECTION, SOLUTION INTRAVENOUS CONTINUOUS
Status: DISCONTINUED | OUTPATIENT
Start: 2022-12-24 | End: 2022-12-25

## 2022-12-24 RX ORDER — QUETIAPINE FUMARATE 50 MG/1
50 TABLET, FILM COATED ORAL AT BEDTIME
Status: DISCONTINUED | OUTPATIENT
Start: 2022-12-24 | End: 2022-12-29 | Stop reason: HOSPADM

## 2022-12-24 RX ORDER — LANOLIN ALCOHOL/MO/W.PET/CERES
6 CREAM (GRAM) TOPICAL
Status: DISCONTINUED | OUTPATIENT
Start: 2022-12-24 | End: 2022-12-29 | Stop reason: HOSPADM

## 2022-12-24 RX ADMIN — Medication 1 DROP: at 21:37

## 2022-12-24 RX ADMIN — RIFAXIMIN 550 MG: 550 TABLET ORAL at 21:37

## 2022-12-24 RX ADMIN — IPRATROPIUM BROMIDE AND ALBUTEROL SULFATE 3 ML: .5; 3 SOLUTION RESPIRATORY (INHALATION) at 11:27

## 2022-12-24 RX ADMIN — PANTOPRAZOLE SODIUM 40 MG: 40 INJECTION, POWDER, FOR SOLUTION INTRAVENOUS at 11:30

## 2022-12-24 RX ADMIN — MELATONIN TAB 3 MG 6 MG: 3 TAB at 02:36

## 2022-12-24 RX ADMIN — HYDROCORTISONE SODIUM SUCCINATE 25 MG: 100 INJECTION, POWDER, FOR SOLUTION INTRAMUSCULAR; INTRAVENOUS at 02:36

## 2022-12-24 RX ADMIN — LACTULOSE 20 G: 20 SOLUTION ORAL at 21:37

## 2022-12-24 RX ADMIN — LEVETIRACETAM 1000 MG: 10 INJECTION INTRAVENOUS at 11:30

## 2022-12-24 RX ADMIN — HEPARIN SODIUM 5000 UNITS: 5000 INJECTION, SOLUTION INTRAVENOUS; SUBCUTANEOUS at 07:21

## 2022-12-24 RX ADMIN — INSULIN ASPART 2 UNITS: 100 INJECTION, SOLUTION INTRAVENOUS; SUBCUTANEOUS at 07:35

## 2022-12-24 RX ADMIN — HYDROCORTISONE SODIUM SUCCINATE 25 MG: 100 INJECTION, POWDER, FOR SOLUTION INTRAMUSCULAR; INTRAVENOUS at 07:21

## 2022-12-24 RX ADMIN — LACTULOSE 20 G: 20 SOLUTION ORAL at 07:20

## 2022-12-24 RX ADMIN — INSULIN ASPART 2 UNITS: 100 INJECTION, SOLUTION INTRAVENOUS; SUBCUTANEOUS at 01:05

## 2022-12-24 RX ADMIN — Medication 5 ML: at 07:21

## 2022-12-24 RX ADMIN — PANTOPRAZOLE SODIUM 40 MG: 40 INJECTION, POWDER, FOR SOLUTION INTRAVENOUS at 07:20

## 2022-12-24 RX ADMIN — POTASSIUM & SODIUM PHOSPHATES POWDER PACK 280-160-250 MG 1 PACKET: 280-160-250 PACK at 11:30

## 2022-12-24 RX ADMIN — IPRATROPIUM BROMIDE AND ALBUTEROL SULFATE 3 ML: .5; 3 SOLUTION RESPIRATORY (INHALATION) at 19:37

## 2022-12-24 RX ADMIN — LACTULOSE 20 G: 20 SOLUTION ORAL at 15:08

## 2022-12-24 RX ADMIN — FOLIC ACID 1 MG: 5 INJECTION, SOLUTION INTRAMUSCULAR; INTRAVENOUS; SUBCUTANEOUS at 11:30

## 2022-12-24 RX ADMIN — RIFAXIMIN 550 MG: 550 TABLET ORAL at 07:20

## 2022-12-24 RX ADMIN — NYSTATIN 500000 UNITS: 100000 SUSPENSION ORAL at 17:13

## 2022-12-24 RX ADMIN — NYSTATIN 500000 UNITS: 100000 SUSPENSION ORAL at 07:19

## 2022-12-24 RX ADMIN — INSULIN ASPART 2 UNITS: 100 INJECTION, SOLUTION INTRAVENOUS; SUBCUTANEOUS at 06:02

## 2022-12-24 RX ADMIN — MIDODRINE HYDROCHLORIDE 10 MG: 5 TABLET ORAL at 11:34

## 2022-12-24 RX ADMIN — SODIUM CHLORIDE 100 MG: 9 INJECTION, SOLUTION INTRAVENOUS at 08:47

## 2022-12-24 RX ADMIN — INSULIN ASPART 1 UNITS: 100 INJECTION, SOLUTION INTRAVENOUS; SUBCUTANEOUS at 11:36

## 2022-12-24 RX ADMIN — CHLORHEXIDINE GLUCONATE 0.12% ORAL RINSE 15 ML: 1.2 LIQUID ORAL at 07:19

## 2022-12-24 RX ADMIN — CHLORHEXIDINE GLUCONATE 0.12% ORAL RINSE 15 ML: 1.2 LIQUID ORAL at 19:38

## 2022-12-24 RX ADMIN — QUETIAPINE FUMARATE 50 MG: 50 TABLET ORAL at 21:37

## 2022-12-24 RX ADMIN — Medication 1 DROP: at 17:13

## 2022-12-24 RX ADMIN — OXYCODONE HYDROCHLORIDE 2.5 MG: 5 TABLET ORAL at 17:24

## 2022-12-24 RX ADMIN — NYSTATIN 500000 UNITS: 100000 SUSPENSION ORAL at 21:37

## 2022-12-24 RX ADMIN — HYDROCORTISONE SODIUM SUCCINATE 25 MG: 100 INJECTION, POWDER, FOR SOLUTION INTRAMUSCULAR; INTRAVENOUS at 19:38

## 2022-12-24 RX ADMIN — HEPARIN SODIUM 1150 UNITS/HR: 10000 INJECTION, SOLUTION INTRAVENOUS at 13:04

## 2022-12-24 RX ADMIN — INSULIN ASPART 1 UNITS: 100 INJECTION, SOLUTION INTRAVENOUS; SUBCUTANEOUS at 15:08

## 2022-12-24 RX ADMIN — HYDROCORTISONE SODIUM SUCCINATE 25 MG: 100 INJECTION, POWDER, FOR SOLUTION INTRAMUSCULAR; INTRAVENOUS at 14:52

## 2022-12-24 RX ADMIN — SODIUM CHLORIDE 100 MG: 9 INJECTION, SOLUTION INTRAVENOUS at 21:36

## 2022-12-24 RX ADMIN — POTASSIUM & SODIUM PHOSPHATES POWDER PACK 280-160-250 MG 1 PACKET: 280-160-250 PACK at 13:23

## 2022-12-24 RX ADMIN — MULTIVITAMIN 15 ML: LIQUID ORAL at 07:20

## 2022-12-24 RX ADMIN — POTASSIUM & SODIUM PHOSPHATES POWDER PACK 280-160-250 MG 1 PACKET: 280-160-250 PACK at 17:13

## 2022-12-24 RX ADMIN — OXYCODONE HYDROCHLORIDE 2.5 MG: 5 TABLET ORAL at 07:21

## 2022-12-24 RX ADMIN — NYSTATIN 500000 UNITS: 100000 SUSPENSION ORAL at 12:21

## 2022-12-24 RX ADMIN — Medication 1 DROP: at 07:20

## 2022-12-24 RX ADMIN — MIDODRINE HYDROCHLORIDE 10 MG: 5 TABLET ORAL at 17:13

## 2022-12-24 RX ADMIN — TACROLIMUS 1 MG: 5 CAPSULE ORAL at 07:21

## 2022-12-24 RX ADMIN — TACROLIMUS 1 MG: 5 CAPSULE ORAL at 21:37

## 2022-12-24 RX ADMIN — IPRATROPIUM BROMIDE AND ALBUTEROL SULFATE 3 ML: .5; 3 SOLUTION RESPIRATORY (INHALATION) at 15:32

## 2022-12-24 RX ADMIN — IPRATROPIUM BROMIDE AND ALBUTEROL SULFATE 3 ML: .5; 3 SOLUTION RESPIRATORY (INHALATION) at 07:26

## 2022-12-24 ASSESSMENT — ACTIVITIES OF DAILY LIVING (ADL)
ADLS_ACUITY_SCORE: 47

## 2022-12-24 NOTE — PROGRESS NOTES
[facility] ICU RESPIRATORY NOTE        Date of Admission: 12/16/2022    Date of Intubation (most recent): Chronic trach    Reason for Mechanical Ventilation: Respiratory failure    Number of Days on Mechanical Ventilation: 9    Met Criteria for Spontaneous Breathing Trial: No    Reason for No Spontaneous Breathing Trial: Per MD    Significant Events Today: None    ABG Results:   Recent Labs   Lab 12/20/22  0719 12/17/22  1136   PH 7.43  --    PCO2 40  --    PO2 118*  --    HCO3 27  --    O2PER 30 30       Current Vent Settings: Vent Mode: CMV/AC  (Continuous Mandatory Ventilation/ Assist Control)  FiO2 (%): 25 %  Resp Rate (Set): 16 breaths/min  Tidal Volume (Set, mL): 450 mL  PEEP (cm H2O): 5 cmH2O  Resp: 15      Jayson Marie, RT on 12/24/2022 at 6:19 AM

## 2022-12-24 NOTE — PROGRESS NOTES
[facility] ICU RESPIRATORY NOTE        Date of Admission: 12/16/2022    Date of Intubation (most recent):  Chronic trach    Reason for Mechanical Ventilation: Resp failure    Number of Days on Mechanical Ventilation: 9    Met Criteria for Spontaneous Breathing Trial: No    Reason for No Spontaneous Breathing Trial: Per MD    Significant Events Today: None    ABG Results:   Recent Labs   Lab 12/20/22  0719   PH 7.43   PCO2 40   PO2 118*   HCO3 27   O2PER 30       Current Vent Settings: Vent Mode: CMV/AC  (Continuous Mandatory Ventilation/ Assist Control)  FiO2 (%): 25 %  Resp Rate (Set): 16 breaths/min  Tidal Volume (Set, mL): 450 mL  PEEP (cm H2O): 5 cmH2O  Resp: 10      Skin Assessment: Intact    Plan: Pt to remain on full vent support overnight    Cheng Douglass, RT

## 2022-12-24 NOTE — PROGRESS NOTES
"Pt has not slept during entire shift. Spoke with provider and received a melatonin order, however that was not effective. Pt alert. Stated \"I want to go home\" several times during shift. Very difficult to understand and communication is frustrating for him. Trach and on ventilator. Sinus tachycardia. Generalized weakness. Fecal management system. Anuric. No acute events. See Flowsheet for further information.      "

## 2022-12-24 NOTE — PROGRESS NOTES
Comprehensive Daily ICU Note        Blanco Osborne MRN# [id number]   Age: 57 year old YOB: 1965     Date of Admission: 12/16/2022    Primary care provider: Ki Powers     CODE STATUS: FULL      Problem List:         Principal Problem:    Pneumothorax on left  Active Problems:    Cirrhosis of liver (H)    Liver transplanted (H)    Acute kidney injury (H)    Embolic stroke (H)    Encephalopathy    Chronic kidney disease, stage V (H)    Cardiac arrest (H)    Seizures (H)    Human herpesvirus 6 viremia             Treatment goals for next 24 hours:   Supportive care    Laura Fernandes MD        Subjective/ Last 24 hours:   Events: None.  Fully alert but a little bit less interactive today         Mechanical Ventilation/Vitalsigns/IsandOs:       Temp:  [97.5  F (36.4  C)-98.7  F (37.1  C)] 97.5  F (36.4  C)  Pulse:  [] 80  Resp:  [0-38] 0  BP: ()/(51-76) 82/51  MAP:  [97 mmHg-141 mmHg] 141 mmHg  Arterial Line BP: (135-192)/() 192/107  FiO2 (%):  [25 %] 25 %  SpO2:  [88 %-100 %] 94 %      Vent Mode: CMV/AC  (Continuous Mandatory Ventilation/ Assist Control)  FiO2 (%): 25 %  Resp Rate (Set): 16 breaths/min  Tidal Volume (Set, mL): 450 mL  PEEP (cm H2O): 5 cmH2O  Resp: (!) 0        Intake/Output Summary (Last 24 hours) at 12/24/2022 1208  Last data filed at 12/24/2022 1200  Gross per 24 hour   Intake 1140 ml   Output 3300 ml   Net -2160 ml   Net: +2.4 liters         Physical Examination:     General: Stated age, chronically ill  HEENT: Healing tracheostomy site  Lungs: Lungs are clear anteriorly, chest tube bandage on left side is clean dry  CVS: Regular rate rhythm  Abdomen: Clean PEG site protuberant but not tense nontender  Extremities/musculoskeletal: Cachectic  Neurology: Patient awake nods yes and no to questions.  Skin: Normal  Psychiatry: Calm  Exam of Line sites: Clean           Feeding/Glucose:     Orders Placed This Encounter      NPO for Medical/Clinical Reasons Except  for: Ice Chips, Meds        Recent Labs   Lab 12/24/22  1136 12/24/22  0735 12/24/22  0551 12/24/22  0523 12/24/22  0055 12/23/22 2039   * 221* 228* 209* 223* 189*                Medications:       - MEDICATION INSTRUCTIONS for Dialysis Patients -   Does not apply See Admin Instructions     sodium chloride 0.9%  300 mL Hemodialysis Machine Once     sodium chloride (PF) 0.9%  10 mL Intracatheter During Dialysis/CRRT (from stock)     sodium chloride (PF) 0.9%  10 mL Intracatheter During Dialysis/CRRT (from stock)     B and C vitamin Complex with folic acid  5 mL Per Feeding Tube Daily     carboxymethylcellulose PF  1 drop Both Eyes TID     chlorhexidine  15 mL Mouth/Throat Q12H     epoetin mirta-epbx  10,000 Units Subcutaneous Once per day on Mon Wed Fri     folic acid  1 mg Intravenous Daily     ganciclovir (CYTOVENE) intermittent infusion  1.25 mg/kg Intravenous Q Mon Wed Fri AM     heparin ANTICOAGULANT  5,000 Units Subcutaneous Q8H     hydrocortisone sodium succinate PF  25 mg Intravenous Q6H     insulin aspart  1-6 Units Subcutaneous Q4H     [Auto Hold] insulin glargine  30 Units Subcutaneous QAM AC     ipratropium - albuterol 0.5 mg/2.5 mg/3 mL  3 mL Nebulization 4x daily     lacosamide (VIMPAT) intermittent infusion  100 mg Intravenous BID     lactulose  20 g Oral or Feeding Tube TID     levETIRAcetam  1,000 mg Intravenous Q24H     midodrine  10 mg Oral or Feeding Tube TID w/meals     multivitamins w/minerals  15 mL Per Feeding Tube Daily     nystatin  500,000 Units Swish & Swallow 4x Daily     pantoprazole  40 mg Intravenous BID 09 12     potassium & sodium phosphates  1 packet Oral or Feeding Tube Q4H     QUEtiapine  50 mg Oral or Feeding Tube At Bedtime     rifaximin  550 mg Per Feeding Tube BID     tacrolimus  1 mg Oral or Feeding Tube BID          dextrose                Labs:         ROUTINE ICU LABS (Last four results)  CMP  Recent Labs   Lab 12/24/22  1136 12/24/22  0735 12/24/22  0551  12/24/22  0523 12/23/22  1511 12/23/22  1212 12/23/22  0901 12/23/22  0401 12/22/22  2038 12/22/22  1652 12/22/22  1612 12/22/22  1108 12/21/22  0406 12/21/22  0405 12/20/22  0432 12/20/22  0426   NA  --   --  137  --   --   --   --  135  --   --   --  133  --  130*  --  131*   POTASSIUM  --   --  3.6  --   --  3.6  --  3.3*  --   --   --  3.3*  --  3.8  --  3.8   CHLORIDE  --   --  102  --   --   --   --  101  --   --   --  99  --  95  --  96   CO2  --   --  25  --   --   --   --  26  --   --   --  26  --  24  --  28   ANIONGAP  --   --  10  --   --   --   --  8  --   --   --  8  --  11  --  7   * 221* 228* 209*   < >  --    < > 205*   < >  --    < > 193*   < > 120*   < > 129*   BUN  --   --  37*  --   --   --   --  48*  --   --   --  44*  --  55*  --  50*   CR  --   --  2.25*  --   --   --   --  2.83*  --   --   --  2.45*  --  2.66*  --  2.22*   GFRESTIMATED  --   --  33*  --   --   --   --  25*  --   --   --  30*  --  27*  --  34*   KELLY  --   --  8.7  --   --   --   --  7.4*  --   --   --  7.6*  --  7.5*  --  7.6*   MAG  --   --  1.9  --   --   --   --  1.8  --   --   --   --   --   --   --   --    PHOS  --   --  2.1*  --   --   --   --  4.3  --   --   --  4.4  --  5.8*  --   --    PROTTOTAL  --   --   --   --   --   --   --  4.6*  --   --   --  4.9*  --  4.7*  --  5.0*   ALBUMIN  --   --  2.5*  --   --   --   --  1.9*  --  2.1*  --  2.1*  --  2.0*  1.9*  --  2.0*   BILITOTAL  --   --   --   --   --   --   --  0.6  --   --   --  0.8  --  1.1  --  1.8*   ALKPHOS  --   --   --   --   --   --   --  198*  --   --   --  163*  --  133  --  147   AST  --   --   --   --   --   --   --  14  --   --   --  16  --  20  --  20   ALT  --   --   --   --   --   --   --  17  --   --   --  17  --  17  --  19    < > = values in this interval not displayed.     CBC  Recent Labs   Lab 12/23/22  0401 12/22/22  1108 12/21/22  1315 12/21/22  0405   WBC 5.3 5.5 3.4* 4.4   RBC 2.57* 2.67* 2.58* 2.63*   HGB 8.2* 8.5* 8.0* 8.1*    HCT 26.6* 26.5* 26.2* 26.6*   * 99 102* 101*   MCH 31.9 31.8 31.0 30.8   MCHC 30.8* 32.1 30.5* 30.5*   RDW 17.9* 18.2* 17.8* 17.7*   PLT 76* 72* 75* 82*     INR  Recent Labs   Lab 12/21/22  1315   INR 1.36*     Arterial Blood Gas  Recent Labs   Lab 12/20/22  0719   PH 7.43   PCO2 40   PO2 118*   HCO3 27   O2PER 30           Cultures:      Recent Labs   Lab 12/22/22  1318 12/22/22  1315 12/21/22  1526   CULTURE No growth, less than 1 day No anaerobic organisms isolated after 1 day No growth after 2 days  No growth after 2 days             Imaging/Other results:     No results found for this or any previous visit (from the past 24 hour(s)).                    Assessment and Plan:     57 year old male with paroxysmal A. fib, Liver transplant (2016), CHRISTIAN on BiPAP, h/o CVA and hypertension. Patient  s/p Trach/PEG following acu had Rt sided Chest tube placed for hydroptx on 12/12/22 and  transferred to [place] on 12/14/22 for CIP.  Pt  noted to have bloody stool on 12/15 and 12/16 and had left Ptx which required left sided chest tube leading to ~900ml of bloody output. Patient with PEA arrest 12/20/22 and now with seizure while on HD.  Appears to be now getting  to his most recent baseline.    Neurology and Psychiatry:  1. Seizure: Acute mental status change with focal motor movements of mouth and EEG correlate.  This may be related to PEA arrest on admission or it may be related to encephalitis.    2.  Baseline multifactorial encephalopathy secondary to his ongoing critical illness and prior cardiac arrests and prior strokes and cirrhosis with hepatic encephalopathy.  - Neuro ICU following-reloaded with Keppra after resolution of above with IV Ativan  - MRI head from 12/20/22 -No PRES or leptomeningeal enhancement  -  LP 12/22/22- Only +ve for HHV6  3. H/o Embolic CVA: Elliquis is held secondary to presumed GI bleed  Plan:  1. Acyclovir changed to Ganciclovir  2. EEG is been discontinued as he has  been seizure-free for 2 days  3. Keppra  4.  Awaiting send out for HHV-6      Pulmonary:  PEA/respiratory arrest prior to admission. Bronchoscopy 12/20/22- Unrevealing for mucus plugs or ANY secretions. S/P Trach (11/29) on vent-failed extubation X 2. New Left Ptx with acute on hypoxic respiratory failure and ? hemithorax: Etiology unclear. Pigtail placed on 12/16/2022 and significant decrease in volume of left pneumothorax.  Chest tube was removed on 1219 hx of right hydropneumothorax: Chest tube removed on prior admission   Plan:  1.  Transition back to pressure support and trach dome and possible      Cardiovascular system:  PEA arrest again prior to this admission. has had a prior in the context of respiratory arrest at the beginning of his previous admission.  No structural cardiac disease but does have A. Fib.  Anticoagulation been on hold because of concern for possible GI bleeding. This has sotpped  Plan:  1 resume anticoagulation today. Will do heparin first.     Renal/Electrolytes:  1. LORETTA: was on CRRT but now on iHD.  No return of renal function.  - MWF schedule- had HD session 12/19/22- tolerated UF       ID:  LP 12/21/22: HHV6 qualitative +ve   - HSV 1 + 2 negatvie, cryptococcus negative  1. H/o Strep/Parainfluenza infection: Completed treatment course  2. 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  ID thought it was was less likely to be clinically significant.  3. 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 Ganciclovir IV-dosing for IHD  2.  Continue ganciclovir while awaiting send out for HHV6.    3. Follow cxs  4.  Continue steroids instead of tacrolimus for now      GI/:  1. Possible GI bleed: No clear evidence ongoing Apparently had bloody stool overnight on 12/17.  This is resolved itself and GI not concern for active bleeding.     2.  Post transplant cirrhosis.  Discussed with hepatologist at [facility] who felt that most likely reason for the cirrhosis was metabolic syndrome or alcohol intake.  There was no evidence of rejection.  Paracentesis done on 1222 with no positive results.    Plan:  1.  Continue medical management as for patients with cirrhosis.  2.  Monitor for GI bleeding  3.  Intermittent paracentesis is needed.    Endocrine:  1. DM.  Also now on steroids while he is in the hospital to replace his tacrolimus.  Plan:  -ICU insulin protocol.      Nutrition:  Resume TF.    Heme/Onc:  1.  Pancytopenia; possibly related to cirrhosis. Chronic splenomegaly-present prior to liver tx but has not regressed. Prior CT chest with evidence of subclavicular LN  Indolent blood dyscrasia  2.  Should be on anticoagulation to prevent embolic strokes.  This had been held.  Plan:  1.  Resume anticoagulation    ICU prophylaxis:      DVT; resuming therapeutic heparin     VAP; As above     Restraints needed: No     Stress ulcer: IV PPI     Central line: Needed today for IV access/Meds.    Code Status: Full    Prognosis: Extremely guarded  Family update: wife on phone      CC time: 45 minutes excluding time for procedures.    Laura Fernandes MD

## 2022-12-24 NOTE — PLAN OF CARE
Goal Outcome Evaluation:     Remains on full vent support. Up to chair with lift. SR/. MAYCOL.

## 2022-12-24 NOTE — PROGRESS NOTES
[facility] ICU RESPIRATORY NOTE        Date of Admission: 12/16/2022    Date of Intubation (most recent): Chronic trach    Reason for Mechanical Ventilation: respiratory failure    Number of Days on Mechanical Ventilation: 8    Met Criteria for Spontaneous Breathing Trial: No    Reason for No Spontaneous Breathing Trial: Per MD    Significant Events Today: NONE    ABG Results:   Recent Labs   Lab 12/20/22  0719 12/17/22  1136   PH 7.43  --    PCO2 40  --    PO2 118*  --    HCO3 27  --    O2PER 30 30       Current Vent Settings: Vent Mode: CMV/AC  (Continuous Mandatory Ventilation/ Assist Control)  FiO2 (%): 25 %  Resp Rate (Set): 16 breaths/min  Tidal Volume (Set, mL): 450 mL  PEEP (cm H2O): 5 cmH2O  Resp: (!) 38      Skin Assessment: Peristoma area reddened    Plan: Continue full ventilatory support     LIANNA JEFF, RT on 12/23/2022 at 6:33 PM

## 2022-12-25 LAB
GLUCOSE BLDC GLUCOMTR-MCNC: 109 MG/DL (ref 70–99)
GLUCOSE BLDC GLUCOMTR-MCNC: 169 MG/DL (ref 70–99)
GLUCOSE BLDC GLUCOMTR-MCNC: 174 MG/DL (ref 70–99)
GLUCOSE BLDC GLUCOMTR-MCNC: 181 MG/DL (ref 70–99)
GLUCOSE BLDC GLUCOMTR-MCNC: 197 MG/DL (ref 70–99)
GLUCOSE BLDC GLUCOMTR-MCNC: 203 MG/DL (ref 70–99)
PHOSPHATE SERPL-MCNC: 2.9 MG/DL (ref 2.5–4.5)
POTASSIUM BLD-SCNC: 3.5 MMOL/L (ref 3.4–5.3)
PROCALCITONIN SERPL-MCNC: 2.28 NG/ML
UFH PPP CHRO-ACNC: 0.25 IU/ML

## 2022-12-25 PROCEDURE — 84100 ASSAY OF PHOSPHORUS: CPT | Performed by: SURGERY

## 2022-12-25 PROCEDURE — 250N000013 HC RX MED GY IP 250 OP 250 PS 637: Performed by: SURGERY

## 2022-12-25 PROCEDURE — 250N000011 HC RX IP 250 OP 636: Performed by: INTERNAL MEDICINE

## 2022-12-25 PROCEDURE — 250N000013 HC RX MED GY IP 250 OP 250 PS 637: Performed by: INTERNAL MEDICINE

## 2022-12-25 PROCEDURE — 250N000013 HC RX MED GY IP 250 OP 250 PS 637: Performed by: PHYSICIAN ASSISTANT

## 2022-12-25 PROCEDURE — 250N000011 HC RX IP 250 OP 636: Performed by: PHYSICIAN ASSISTANT

## 2022-12-25 PROCEDURE — 84145 PROCALCITONIN (PCT): CPT | Performed by: INTERNAL MEDICINE

## 2022-12-25 PROCEDURE — 200N000001 HC R&B ICU

## 2022-12-25 PROCEDURE — 250N000012 HC RX MED GY IP 250 OP 636 PS 637: Performed by: INTERNAL MEDICINE

## 2022-12-25 PROCEDURE — 85520 HEPARIN ASSAY: CPT | Performed by: SURGERY

## 2022-12-25 PROCEDURE — 94640 AIRWAY INHALATION TREATMENT: CPT

## 2022-12-25 PROCEDURE — 999N000253 HC STATISTIC WEANING TRIALS

## 2022-12-25 PROCEDURE — 250N000011 HC RX IP 250 OP 636: Performed by: SURGERY

## 2022-12-25 PROCEDURE — 250N000009 HC RX 250: Performed by: INTERNAL MEDICINE

## 2022-12-25 PROCEDURE — 999N000009 HC STATISTIC AIRWAY CARE

## 2022-12-25 PROCEDURE — C9113 INJ PANTOPRAZOLE SODIUM, VIA: HCPCS | Performed by: PHYSICIAN ASSISTANT

## 2022-12-25 PROCEDURE — 94640 AIRWAY INHALATION TREATMENT: CPT | Mod: 76

## 2022-12-25 PROCEDURE — 258N000003 HC RX IP 258 OP 636: Performed by: PSYCHIATRY & NEUROLOGY

## 2022-12-25 PROCEDURE — 250N000011 HC RX IP 250 OP 636: Performed by: PSYCHIATRY & NEUROLOGY

## 2022-12-25 PROCEDURE — C9254 INJECTION, LACOSAMIDE: HCPCS | Performed by: PSYCHIATRY & NEUROLOGY

## 2022-12-25 PROCEDURE — 94003 VENT MGMT INPAT SUBQ DAY: CPT

## 2022-12-25 PROCEDURE — 84132 ASSAY OF SERUM POTASSIUM: CPT | Performed by: SURGERY

## 2022-12-25 PROCEDURE — 99233 SBSQ HOSP IP/OBS HIGH 50: CPT | Performed by: INTERNAL MEDICINE

## 2022-12-25 PROCEDURE — 999N000157 HC STATISTIC RCP TIME EA 10 MIN

## 2022-12-25 RX ORDER — LACOSAMIDE 10 MG/ML
100 SOLUTION ORAL 2 TIMES DAILY
Status: DISCONTINUED | OUTPATIENT
Start: 2022-12-25 | End: 2022-12-29 | Stop reason: HOSPADM

## 2022-12-25 RX ORDER — LEVETIRACETAM 100 MG/ML
1000 SOLUTION ORAL EVERY 24 HOURS
Status: DISCONTINUED | OUTPATIENT
Start: 2022-12-26 | End: 2022-12-29 | Stop reason: HOSPADM

## 2022-12-25 RX ORDER — FOLIC ACID 1 MG/1
1 TABLET ORAL DAILY
Status: DISCONTINUED | OUTPATIENT
Start: 2022-12-26 | End: 2022-12-29 | Stop reason: HOSPADM

## 2022-12-25 RX ORDER — LEVOFLOXACIN 5 MG/ML
500 INJECTION, SOLUTION INTRAVENOUS
Status: DISCONTINUED | OUTPATIENT
Start: 2022-12-25 | End: 2022-12-26

## 2022-12-25 RX ADMIN — OXYCODONE HYDROCHLORIDE 2.5 MG: 5 TABLET ORAL at 00:09

## 2022-12-25 RX ADMIN — NYSTATIN 500000 UNITS: 100000 SUSPENSION ORAL at 17:04

## 2022-12-25 RX ADMIN — LEVOFLOXACIN 500 MG: 5 INJECTION, SOLUTION INTRAVENOUS at 13:46

## 2022-12-25 RX ADMIN — NYSTATIN 500000 UNITS: 100000 SUSPENSION ORAL at 13:47

## 2022-12-25 RX ADMIN — APIXABAN 5 MG: 5 TABLET, FILM COATED ORAL at 12:21

## 2022-12-25 RX ADMIN — OXYCODONE HYDROCHLORIDE 2.5 MG: 5 TABLET ORAL at 21:12

## 2022-12-25 RX ADMIN — TACROLIMUS 1 MG: 5 CAPSULE ORAL at 07:27

## 2022-12-25 RX ADMIN — IPRATROPIUM BROMIDE AND ALBUTEROL SULFATE 3 ML: .5; 3 SOLUTION RESPIRATORY (INHALATION) at 11:13

## 2022-12-25 RX ADMIN — FOLIC ACID 1 MG: 5 INJECTION, SOLUTION INTRAMUSCULAR; INTRAVENOUS; SUBCUTANEOUS at 07:26

## 2022-12-25 RX ADMIN — RIFAXIMIN 550 MG: 550 TABLET ORAL at 07:25

## 2022-12-25 RX ADMIN — APIXABAN 5 MG: 5 TABLET, FILM COATED ORAL at 20:48

## 2022-12-25 RX ADMIN — QUETIAPINE FUMARATE 50 MG: 50 TABLET ORAL at 21:12

## 2022-12-25 RX ADMIN — IPRATROPIUM BROMIDE AND ALBUTEROL SULFATE 3 ML: .5; 3 SOLUTION RESPIRATORY (INHALATION) at 07:29

## 2022-12-25 RX ADMIN — OXYCODONE HYDROCHLORIDE 2.5 MG: 5 TABLET ORAL at 07:24

## 2022-12-25 RX ADMIN — HEPARIN SODIUM 1150 UNITS/HR: 10000 INJECTION, SOLUTION INTRAVENOUS at 05:22

## 2022-12-25 RX ADMIN — Medication 1 DROP: at 07:26

## 2022-12-25 RX ADMIN — TACROLIMUS 1 MG: 5 CAPSULE ORAL at 20:48

## 2022-12-25 RX ADMIN — MIDODRINE HYDROCHLORIDE 10 MG: 5 TABLET ORAL at 07:25

## 2022-12-25 RX ADMIN — PANTOPRAZOLE SODIUM 40 MG: 40 INJECTION, POWDER, FOR SOLUTION INTRAVENOUS at 07:25

## 2022-12-25 RX ADMIN — HYDROCORTISONE SODIUM SUCCINATE 25 MG: 100 INJECTION, POWDER, FOR SOLUTION INTRAMUSCULAR; INTRAVENOUS at 01:56

## 2022-12-25 RX ADMIN — LACOSAMIDE 100 MG: 10 SOLUTION ORAL at 20:48

## 2022-12-25 RX ADMIN — HYDROCORTISONE SODIUM SUCCINATE 25 MG: 100 INJECTION, POWDER, FOR SOLUTION INTRAMUSCULAR; INTRAVENOUS at 19:41

## 2022-12-25 RX ADMIN — NYSTATIN 500000 UNITS: 100000 SUSPENSION ORAL at 07:24

## 2022-12-25 RX ADMIN — MIDODRINE HYDROCHLORIDE 10 MG: 5 TABLET ORAL at 17:04

## 2022-12-25 RX ADMIN — Medication 1 DROP: at 21:12

## 2022-12-25 RX ADMIN — INSULIN ASPART 1 UNITS: 100 INJECTION, SOLUTION INTRAVENOUS; SUBCUTANEOUS at 07:21

## 2022-12-25 RX ADMIN — INSULIN ASPART 1 UNITS: 100 INJECTION, SOLUTION INTRAVENOUS; SUBCUTANEOUS at 11:22

## 2022-12-25 RX ADMIN — IPRATROPIUM BROMIDE AND ALBUTEROL SULFATE 3 ML: .5; 3 SOLUTION RESPIRATORY (INHALATION) at 14:39

## 2022-12-25 RX ADMIN — RIFAXIMIN 550 MG: 550 TABLET ORAL at 20:48

## 2022-12-25 RX ADMIN — Medication 1 DROP: at 15:38

## 2022-12-25 RX ADMIN — HYDROCORTISONE SODIUM SUCCINATE 25 MG: 100 INJECTION, POWDER, FOR SOLUTION INTRAMUSCULAR; INTRAVENOUS at 07:25

## 2022-12-25 RX ADMIN — Medication 5 ML: at 07:26

## 2022-12-25 RX ADMIN — LACTULOSE 20 G: 20 SOLUTION ORAL at 21:12

## 2022-12-25 RX ADMIN — CHLORHEXIDINE GLUCONATE 0.12% ORAL RINSE 15 ML: 1.2 LIQUID ORAL at 07:23

## 2022-12-25 RX ADMIN — INSULIN ASPART 2 UNITS: 100 INJECTION, SOLUTION INTRAVENOUS; SUBCUTANEOUS at 00:16

## 2022-12-25 RX ADMIN — SODIUM CHLORIDE 100 MG: 9 INJECTION, SOLUTION INTRAVENOUS at 09:00

## 2022-12-25 RX ADMIN — LACTULOSE 20 G: 20 SOLUTION ORAL at 15:38

## 2022-12-25 RX ADMIN — MULTIVITAMIN 15 ML: LIQUID ORAL at 07:25

## 2022-12-25 RX ADMIN — NYSTATIN 500000 UNITS: 100000 SUSPENSION ORAL at 21:12

## 2022-12-25 RX ADMIN — LEVETIRACETAM 1000 MG: 10 INJECTION INTRAVENOUS at 11:40

## 2022-12-25 RX ADMIN — IPRATROPIUM BROMIDE AND ALBUTEROL SULFATE 3 ML: .5; 3 SOLUTION RESPIRATORY (INHALATION) at 19:15

## 2022-12-25 RX ADMIN — INSULIN ASPART 1 UNITS: 100 INJECTION, SOLUTION INTRAVENOUS; SUBCUTANEOUS at 15:41

## 2022-12-25 RX ADMIN — INSULIN ASPART 2 UNITS: 100 INJECTION, SOLUTION INTRAVENOUS; SUBCUTANEOUS at 04:43

## 2022-12-25 RX ADMIN — LACTULOSE 20 G: 20 SOLUTION ORAL at 07:24

## 2022-12-25 RX ADMIN — MIDODRINE HYDROCHLORIDE 10 MG: 5 TABLET ORAL at 11:40

## 2022-12-25 RX ADMIN — CHLORHEXIDINE GLUCONATE 0.12% ORAL RINSE 15 ML: 1.2 LIQUID ORAL at 19:41

## 2022-12-25 RX ADMIN — Medication 40 MG: at 20:48

## 2022-12-25 ASSESSMENT — ACTIVITIES OF DAILY LIVING (ADL)
ADLS_ACUITY_SCORE: 47

## 2022-12-25 NOTE — PROVIDER NOTIFICATION
Dr. Phillips notified of gram positive cocci in paracentesis abdominal fluid.    Update: it's GP bacilli --Jesse Phillips

## 2022-12-25 NOTE — PROGRESS NOTES
[facility] Neuro issues addressed:    - CNS infection as potential etiology for encephalopathy, new onset seizures  - New onset seizures  - New strokes    Please see previous consult note for more details.    Stroke Neurology signing off, please page/call with any further questions, concerns.     Maurice Benjamin MD PGY5 Stroke Neurology

## 2022-12-25 NOTE — PROGRESS NOTES
[facility] ICU RESPIRATORY NOTE        Date of Admission: 12/16/2022    Date of Intubation (most recent):  Chronic trach    Reason for Mechanical Ventilation: Resp failure    Number of Days on Mechanical Ventilation: 10    Met Criteria for Spontaneous Breathing Trial: Yes - PS 10/5 for 4 hrs and then PS 5/5 for 4 hrs    Significant Events Today: None    ABG Results:   Recent Labs   Lab 12/20/22  0719   PH 7.43   PCO2 40   PO2 118*   HCO3 27   O2PER 30       Current Vent Settings: Vent Mode: CMV/AC  (Continuous Mandatory Ventilation/ Assist Control)  FiO2 (%): 25 %  Resp Rate (Set): 16 breaths/min  Tidal Volume (Set, mL): 450 mL  PEEP (cm H2O): 5 cmH2O  Pressure Support (cm H2O): 10 cmH2O  Resp: 13      Skin Assessment: Intact    Plan: Pt to remain on full vent support overnight    Cheng Douglass, RT

## 2022-12-25 NOTE — PLAN OF CARE
Goal Outcome Evaluation:         P/S for a few hours. Up to chair. SR/ST. BP stable. Family at bedside and updated.

## 2022-12-25 NOTE — PROGRESS NOTES
Comprehensive Daily ICU Note        Blanco Osborne MRN# [id number]   Age: 57 year old YOB: 1965     Date of Admission: 12/16/2022    Primary care provider: Ki Powers     CODE STATUS: FULL      Problem List:         Principal Problem:    Pneumothorax on left  Active Problems:    Cirrhosis of liver (H)    Liver transplanted (H)    Acute kidney injury (H)    Embolic stroke (H)    Encephalopathy    Chronic kidney disease, stage V (H)    Cardiac arrest (H)    Seizures (H)    Human herpesvirus 6 viremia             Treatment goals for next 24 hours:   Change to Eliuquis  Stop iv heparin  Change vimpat and keppra to enteral  PS trials         Subjective/ Last 24 hours:   Events: None. Doing better on PS         Mechanical Ventilation/Vitalsigns/IsandOs:       Temp:  [97.3  F (36.3  C)-99.3  F (37.4  C)] 98  F (36.7  C)  Pulse:  [] 93  Resp:  [8-26] 8  BP: ()/() 101/73  FiO2 (%):  [25 %] 25 %  SpO2:  [92 %-100 %] 93 %      Vent Mode: PS  (Pressure Support)  FiO2 (%): 25 %  Resp Rate (Set): 16 breaths/min  Tidal Volume (Set, mL): 450 mL  PEEP (cm H2O): 5 cmH2O  Pressure Support (cm H2O): 5 cmH2O  Resp: (!) 8      Last H Don Friday withy 4000 off          Physical Examination:     General: sitting in chair  Looking out the window  Very debilitated, follows commands and  can weakly squeeze hands and wiggle toes but can't lift feet off the floor   HEENT: Healing tracheostomy site  Lungs: Lungs are clear anteriorly  CVS: Regular rate rhythm  Abdomen: Clean PEG site protuberant but not tense,  nontender  Extremities/musculoskeletal: Cachectic, extensive ecchymoses on skin especially upper chest   Neurology: Patient awake nods yes and no to questions.           Feeding/Glucose:     Orders Placed This Encounter      NPO for Medical/Clinical Reasons Except for: Ice Chips, Meds        Recent Labs   Lab 12/25/22  1118 12/25/22  0719 12/25/22  0415 12/25/22  0001 12/24/22  1944  12/24/22  1506   * 181* 203* 197* 134* 167*                Medications:       - MEDICATION INSTRUCTIONS for Dialysis Patients -   Does not apply See Admin Instructions     sodium chloride 0.9%  300 mL Hemodialysis Machine Once     sodium chloride (PF) 0.9%  10 mL Intracatheter During Dialysis/CRRT (from stock)     sodium chloride (PF) 0.9%  10 mL Intracatheter During Dialysis/CRRT (from stock)     apixaban ANTICOAGULANT  5 mg Oral or Feeding Tube BID     B and C vitamin Complex with folic acid  5 mL Per Feeding Tube Daily     carboxymethylcellulose PF  1 drop Both Eyes TID     chlorhexidine  15 mL Mouth/Throat Q12H     epoetin mirta-epbx  10,000 Units Subcutaneous Once per day on Mon Wed Fri     [START ON 12/26/2022] folic acid  1 mg Oral or Feeding Tube Daily     ganciclovir (CYTOVENE) intermittent infusion  1.25 mg/kg Intravenous Q Mon Wed Fri AM     hydrocortisone sodium succinate PF  25 mg Intravenous Q6H     insulin aspart  1-6 Units Subcutaneous Q4H     [Auto Hold] insulin glargine  30 Units Subcutaneous QAM AC     ipratropium - albuterol 0.5 mg/2.5 mg/3 mL  3 mL Nebulization 4x daily     lacosamide (VIMPAT) intermittent infusion  100 mg Intravenous BID     lactulose  20 g Oral or Feeding Tube TID     levETIRAcetam  1,000 mg Intravenous Q24H     midodrine  10 mg Oral or Feeding Tube TID w/meals     multivitamins w/minerals  15 mL Per Feeding Tube Daily     nystatin  500,000 Units Swish & Swallow 4x Daily     pantoprazole  40 mg Per Feeding Tube Q12H     QUEtiapine  50 mg Oral or Feeding Tube At Bedtime     rifaximin  550 mg Per Feeding Tube BID     tacrolimus  1 mg Oral or Feeding Tube BID          dextrose                Labs:         ROUTINE ICU LABS (Last four results)  CMP  Recent Labs   Lab 12/25/22  1118 12/25/22  0719 12/25/22  0525 12/25/22  0415 12/25/22  0001 12/24/22  0735 12/24/22  0551 12/23/22  1511 12/23/22  1212 12/23/22  0901 12/23/22  0401 12/22/22  2038 12/22/22  1652 12/22/22  1612  12/22/22  1108 12/21/22  0406 12/21/22  0405 12/20/22  6462 12/20/22  3486   NA  --   --   --   --   --   --  137  --   --   --  135  --   --   --  133  --  130*  --  131*   POTASSIUM  --   --  3.5  --   --   --  3.6  --  3.6  --  3.3*  --   --   --  3.3*  --  3.8  --  3.8   CHLORIDE  --   --   --   --   --   --  102  --   --   --  101  --   --   --  99  --  95  --  96   CO2  --   --   --   --   --   --  25  --   --   --  26  --   --   --  26  --  24  --  28   ANIONGAP  --   --   --   --   --   --  10  --   --   --  8  --   --   --  8  --  11  --  7   * 181*  --  203* 197*   < > 228*   < >  --    < > 205*   < >  --    < > 193*   < > 120*   < > 129*   BUN  --   --   --   --   --   --  37*  --   --   --  48*  --   --   --  44*  --  55*  --  50*   CR  --   --   --   --   --   --  2.25*  --   --   --  2.83*  --   --   --  2.45*  --  2.66*  --  2.22*   GFRESTIMATED  --   --   --   --   --   --  33*  --   --   --  25*  --   --   --  30*  --  27*  --  34*   KELLY  --   --   --   --   --   --  8.7  --   --   --  7.4*  --   --   --  7.6*  --  7.5*  --  7.6*   MAG  --   --   --   --   --   --  1.9  --   --   --  1.8  --   --   --   --   --   --   --   --    PHOS  --   --  2.9  --   --   --  2.1*  --   --   --  4.3  --   --   --  4.4  --  5.8*  --   --    PROTTOTAL  --   --   --   --   --   --   --   --   --   --  4.6*  --   --   --  4.9*  --  4.7*  --  5.0*   ALBUMIN  --   --   --   --   --   --  2.5*  --   --   --  1.9*  --  2.1*  --  2.1*  --  2.0*  1.9*  --  2.0*   BILITOTAL  --   --   --   --   --   --   --   --   --   --  0.6  --   --   --  0.8  --  1.1  --  1.8*   ALKPHOS  --   --   --   --   --   --   --   --   --   --  198*  --   --   --  163*  --  133  --  147   AST  --   --   --   --   --   --   --   --   --   --  14  --   --   --  16  --  20  --  20   ALT  --   --   --   --   --   --   --   --   --   --  17  --   --   --  17  --  17  --  19    < > = values in this interval not displayed.     CBC  Recent Labs    Lab 12/24/22  1808 12/23/22  0401 12/22/22  1108 12/21/22  1315   WBC 5.8 5.3 5.5 3.4*   RBC 2.68* 2.57* 2.67* 2.58*   HGB 8.4* 8.2* 8.5* 8.0*   HCT 28.4* 26.6* 26.5* 26.2*   * 104* 99 102*   MCH 31.3 31.9 31.8 31.0   MCHC 29.6* 30.8* 32.1 30.5*   RDW 18.0* 17.9* 18.2* 17.8*   PLT 58* 76* 72* 75*     INR  Recent Labs   Lab 12/21/22  1315   INR 1.36*     Arterial Blood Gas  Recent Labs   Lab 12/20/22  0719   PH 7.43   PCO2 40   PO2 118*   HCO3 27   O2PER 30           Cultures:      Recent Labs   Lab 12/22/22  1318 12/22/22  1315 12/21/22  1526   CULTURE No growth after 2 days Culture in progress  Isolated in broth only Gram positive bacilli* No growth after 3 days  No growth after 3 days             Imaging/Other results:     Recent Results (from the past 24 hour(s))   XR Chest Port 1 View    Narrative    EXAM: XR CHEST PORT 1 VIEW  LOCATION: [facility]  DATE/TIME: 12/24/2022 1:37 PM    INDICATION: respiratory faliure  COMPARISON: 12/21/2022      Impression    IMPRESSION: Tracheostomy, right IJ tunneled dialysis catheter and right arm PICC unchanged. Left basilar small bore chest tube has been removed.      Stable pulmonary edema, small bilateral pleural effusions, right greater than left and mild bibasilar atelectasis. No pneumothorax. Normal heart size.                       Assessment and Plan:     57 year old male with paroxysmal A. fib, Liver transplant (2016), CHRISTIAN on BiPAP, h/o CVA and hypertension. Patient  s/p Trach/PEG following acu had Rt sided Chest tube placed for hydroptx on 12/12/22 and  transferred to [place] on 12/14/22 for CIP.  Pt  noted to have bloody stool on 12/15 and 12/16 and had left Ptx which required left sided chest tube leading to ~900ml of bloody output. Patient with PEA arrest 12/20/22 and now with seizure while on HD.    No bleeding seen     Neurology and Psychiatry:  1. Seizure seconday to strokes and HHV 6 --on vimpat and keppra iv   2.  Baseline  multifactorial encephalopathy secondary to his ongoing critical illness and prior cardiac arrests and prior strokes and cirrhosis with hepatic encephalopathy.  - MRI head from 12/20/22 -No PRES or leptomeningeal enhancement but scattered embolic tiny infarcts   -  LP 12/22/22- Only +ve for HHV6  3. H/o Embolic CVA: Elliquis is held secondary to presumed GI bleed  Plan:  1. Acyclovir changed to Ganciclovir  3. Keppra and vimpat change to enteral dosing       Pulmonary:  PEA/respiratory arrest prior to admission. Bronchoscopy 12/20/22-  S/P Trach (11/29) after failed extubation X 2. New Left Ptx with acute on hypoxic respiratory failure and ? hemithorax: Etiology unclear. Pigtail placed on 12/16/2022 and significant decrease in volume of left pneumothorax.  Chest tube was removed on 1219 hx of right hydropneumothorax:    Plan:  1.  Transition back to pressure support daily--doing ok today and then advance to [facility]      Cardiovascular system:  PEA arrest again prior to this admission. has had a prior in the context of respiratory arrest at the beginning of his previous admission.  No structural cardiac disease but does have A. Fib.  Anticoagulation been on hold because of concern for possible GI bleeding. This has stopped and tolerating iv heparin although always high risk for bleeding seconday to thrombocytopenia   Plan:  1 change to Eliquis today, stop heparin .   Wean off stress dose hydrocortisone     Renal/Electrolytes:  1. LORETTA: was on CRRT but now on iHD.  No return of renal function.  - MWF schedule- had HD session 12/23  tolerated 4000 UF  HD  Tomorrow        ID:  LP 12/21/22: HHV6 qualitative +ve   - HSV 1 + 2 negatvie, cryptococcus negative  1. H/o Strep/Parainfluenza infection: Completed treatment course  2. 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   3. 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 Ganciclovir IV-dosing for IHD    Just notified of GP bacilli in broth only in paracentesis fluid from 3 days ago. The cell count of that fluid was very low.  94.   Will give renal dosing levofloxacin until speciation        GI/:  1. Possible GI bleed: No clear evidence ongoing Apparently had bloody stool overnight on 12/17.  This is resolved itself and GI not concern for active bleeding.    2.  Post transplant cirrhosis.  hepatologist at [place] felt that most likely reason for the cirrhosis was metabolic syndrome or alcohol intake.  There was no evidence of rejection.  Paracentesis done on 1222 with no positive results.    Plan:  3.  Intermittent paracentesis is needed.  4.  Getting 1 mg  tacrolimus BID     Endocrine:  1. DM.    Plan:  -ICU insulin protocol.      Nutrition:  At goal tube feeds via perc G tube    Heme/Onc:  1.  Pancytopenia; possibly related to cirrhosis. Chronic splenomegaly-present prior to liver tx but has not regressed.     Plan:  1.  Resume eliquis     ICU prophylaxis:      DVT; resuming eliquis      Restraints needed: No     Stress ulcer: IV PPI     Central line: Needed today for IV access/Meds.    Code Status: Full    Family update:    PLAN  PS trials  Start levofloxacin  Change from heparin to eliquis  Decrease HC to 25 BID

## 2022-12-25 NOTE — PROGRESS NOTES
Brief Nephrology Note     Chart reviewed. Remains ventilated but improving on PS trials. Vitals and labs reviewed. Dialysis orders for tomorrow placed.     - dialysis tomorrow, goal UF 2.5-3L   - epo 10K    Dwayne Laboy MD

## 2022-12-25 NOTE — PLAN OF CARE
[id number]    Alert and follows commands, more alert in morning and says he is feeling better after rest. SR. Trach in place, full vent support. Rectal tube in for loose stool/lactulose. Anuric, dialysis pt. Skin issues documented in flowsheets. PICC x3, dialysis cath. Heparin running. Continue to monitor.

## 2022-12-25 NOTE — PROGRESS NOTES
[facility] Progress Note       ICU     Visited with Blanco Osborne and family at the bedside per-follow up plan of care.    Visit was brief as family noted that they were trying to spend some time with Blanco on [place].     Family expressed their appreciation for the  visit and stated that they have multiple [employer] pastors in their family (including Blanco's father). They also noted appreciation for  providing communion for the pt and I affirmed that we would continue following him while admitted. I ended the visit with a prayer for Blanco and his family.     remains available to support this pt. Please consult or page if any immediate needs arise.     ------  Vitor Palacios M.Div.  Resident   Pager: [phone]

## 2022-12-26 ENCOUNTER — APPOINTMENT (OUTPATIENT)
Dept: OCCUPATIONAL THERAPY | Facility: CLINIC | Age: 57
DRG: 207 | End: 2022-12-26
Attending: INTERNAL MEDICINE
Payer: COMMERCIAL

## 2022-12-26 LAB
ALBUMIN SERPL-MCNC: 1.9 G/DL (ref 3.4–5)
ANION GAP SERPL CALCULATED.3IONS-SCNC: 9 MMOL/L (ref 3–14)
BACTERIA CSF CULT: NO GROWTH
BUN SERPL-MCNC: 62 MG/DL (ref 7–30)
CALCIUM SERPL-MCNC: 8.2 MG/DL (ref 8.5–10.1)
CHLORIDE BLD-SCNC: 101 MMOL/L (ref 94–109)
CO2 SERPL-SCNC: 26 MMOL/L (ref 20–32)
CREAT SERPL-MCNC: 3.08 MG/DL (ref 0.66–1.25)
ERYTHROCYTE [DISTWIDTH] IN BLOOD BY AUTOMATED COUNT: 18.1 % (ref 10–15)
FERRITIN SERPL-MCNC: 252 NG/ML (ref 26–388)
GFR SERPL CREATININE-BSD FRML MDRD: 23 ML/MIN/1.73M2
GLUCOSE BLD-MCNC: 231 MG/DL (ref 70–99)
GLUCOSE BLDC GLUCOMTR-MCNC: 134 MG/DL (ref 70–99)
GLUCOSE BLDC GLUCOMTR-MCNC: 155 MG/DL (ref 70–99)
GLUCOSE BLDC GLUCOMTR-MCNC: 178 MG/DL (ref 70–99)
GLUCOSE BLDC GLUCOMTR-MCNC: 201 MG/DL (ref 70–99)
GLUCOSE BLDC GLUCOMTR-MCNC: 224 MG/DL (ref 70–99)
GLUCOSE BLDC GLUCOMTR-MCNC: 226 MG/DL (ref 70–99)
GRAM STAIN RESULT: NORMAL
HCT VFR BLD AUTO: 26.1 % (ref 40–53)
HGB BLD-MCNC: 8.1 G/DL (ref 13.3–17.7)
HHV-6 DNA COPIES/ML, INSTRUMENT: ABNORMAL COPIES/ML
HHV6 DNA SPEC NAA+PROBE-LOG#: 6.1 {LOG_COPIES}/ML
IRON SATN MFR SERPL: 25 % (ref 15–46)
IRON SERPL-MCNC: 28 UG/DL (ref 35–180)
MAGNESIUM SERPL-MCNC: 2 MG/DL (ref 1.6–2.3)
MCH RBC QN AUTO: 31.9 PG (ref 26.5–33)
MCHC RBC AUTO-ENTMCNC: 31 G/DL (ref 31.5–36.5)
MCV RBC AUTO: 103 FL (ref 78–100)
MISCELLANEOUS TEST 1 (ARUP): ABNORMAL
PATH REPORT.COMMENTS IMP SPEC: NORMAL
PATH REPORT.FINAL DX SPEC: NORMAL
PATH REPORT.GROSS SPEC: NORMAL
PATH REPORT.MICROSCOPIC SPEC OTHER STN: NORMAL
PATH REPORT.RELEVANT HX SPEC: NORMAL
PHOSPHATE SERPL-MCNC: 3.1 MG/DL (ref 2.5–4.5)
PLATELET # BLD AUTO: 55 10E3/UL (ref 150–450)
POTASSIUM BLD-SCNC: 3.7 MMOL/L (ref 3.4–5.3)
RBC # BLD AUTO: 2.54 10E6/UL (ref 4.4–5.9)
SCANNED LAB RESULT: NORMAL
SODIUM SERPL-SCNC: 136 MMOL/L (ref 133–144)
TACROLIMUS BLD-MCNC: 4.6 UG/L (ref 5–15)
TIBC SERPL-MCNC: 114 UG/DL (ref 240–430)
TME LAST DOSE: ABNORMAL H
TME LAST DOSE: ABNORMAL H
WBC # BLD AUTO: 4.8 10E3/UL (ref 4–11)

## 2022-12-26 PROCEDURE — 258N000003 HC RX IP 258 OP 636: Performed by: SPECIALIST

## 2022-12-26 PROCEDURE — 999N000157 HC STATISTIC RCP TIME EA 10 MIN

## 2022-12-26 PROCEDURE — 90937 HEMODIALYSIS REPEATED EVAL: CPT

## 2022-12-26 PROCEDURE — 250N000013 HC RX MED GY IP 250 OP 250 PS 637: Performed by: PHYSICIAN ASSISTANT

## 2022-12-26 PROCEDURE — 999N000009 HC STATISTIC AIRWAY CARE

## 2022-12-26 PROCEDURE — 80197 ASSAY OF TACROLIMUS: CPT | Performed by: HOSPITALIST

## 2022-12-26 PROCEDURE — 250N000013 HC RX MED GY IP 250 OP 250 PS 637: Performed by: INTERNAL MEDICINE

## 2022-12-26 PROCEDURE — 250N000013 HC RX MED GY IP 250 OP 250 PS 637: Performed by: SURGERY

## 2022-12-26 PROCEDURE — 97530 THERAPEUTIC ACTIVITIES: CPT | Mod: GO | Performed by: OCCUPATIONAL THERAPIST

## 2022-12-26 PROCEDURE — 94003 VENT MGMT INPAT SUBQ DAY: CPT

## 2022-12-26 PROCEDURE — 88305 TISSUE EXAM BY PATHOLOGIST: CPT | Mod: 26 | Performed by: PATHOLOGY

## 2022-12-26 PROCEDURE — 94640 AIRWAY INHALATION TREATMENT: CPT | Mod: 76

## 2022-12-26 PROCEDURE — 250N000011 HC RX IP 250 OP 636: Performed by: SPECIALIST

## 2022-12-26 PROCEDURE — 87533 HHV-6 DNA QUANT: CPT | Performed by: SPECIALIST

## 2022-12-26 PROCEDURE — 80069 RENAL FUNCTION PANEL: CPT | Performed by: STUDENT IN AN ORGANIZED HEALTH CARE EDUCATION/TRAINING PROGRAM

## 2022-12-26 PROCEDURE — 258N000003 HC RX IP 258 OP 636: Performed by: STUDENT IN AN ORGANIZED HEALTH CARE EDUCATION/TRAINING PROGRAM

## 2022-12-26 PROCEDURE — 83735 ASSAY OF MAGNESIUM: CPT | Performed by: INTERNAL MEDICINE

## 2022-12-26 PROCEDURE — 99233 SBSQ HOSP IP/OBS HIGH 50: CPT | Performed by: INTERNAL MEDICINE

## 2022-12-26 PROCEDURE — 88112 CYTOPATH CELL ENHANCE TECH: CPT | Mod: 26 | Performed by: PATHOLOGY

## 2022-12-26 PROCEDURE — 250N000012 HC RX MED GY IP 250 OP 636 PS 637: Performed by: INTERNAL MEDICINE

## 2022-12-26 PROCEDURE — 85027 COMPLETE CBC AUTOMATED: CPT | Performed by: INTERNAL MEDICINE

## 2022-12-26 PROCEDURE — 99223 1ST HOSP IP/OBS HIGH 75: CPT | Performed by: SPECIALIST

## 2022-12-26 PROCEDURE — 99232 SBSQ HOSP IP/OBS MODERATE 35: CPT | Performed by: STUDENT IN AN ORGANIZED HEALTH CARE EDUCATION/TRAINING PROGRAM

## 2022-12-26 PROCEDURE — 97167 OT EVAL HIGH COMPLEX 60 MIN: CPT | Mod: GO | Performed by: OCCUPATIONAL THERAPIST

## 2022-12-26 PROCEDURE — 82728 ASSAY OF FERRITIN: CPT | Performed by: STUDENT IN AN ORGANIZED HEALTH CARE EDUCATION/TRAINING PROGRAM

## 2022-12-26 PROCEDURE — 94640 AIRWAY INHALATION TREATMENT: CPT

## 2022-12-26 PROCEDURE — 36415 COLL VENOUS BLD VENIPUNCTURE: CPT | Performed by: SPECIALIST

## 2022-12-26 PROCEDURE — 634N000001 HC RX 634: Performed by: SURGERY

## 2022-12-26 PROCEDURE — 250N000009 HC RX 250: Performed by: INTERNAL MEDICINE

## 2022-12-26 PROCEDURE — 87040 BLOOD CULTURE FOR BACTERIA: CPT | Performed by: SPECIALIST

## 2022-12-26 PROCEDURE — 200N000001 HC R&B ICU

## 2022-12-26 PROCEDURE — 83550 IRON BINDING TEST: CPT | Performed by: STUDENT IN AN ORGANIZED HEALTH CARE EDUCATION/TRAINING PROGRAM

## 2022-12-26 RX ORDER — PREDNISONE 10 MG/1
10 TABLET ORAL DAILY
Status: DISCONTINUED | OUTPATIENT
Start: 2022-12-26 | End: 2022-12-27

## 2022-12-26 RX ORDER — CEFTRIAXONE 2 G/1
2 INJECTION, POWDER, FOR SOLUTION INTRAMUSCULAR; INTRAVENOUS EVERY 24 HOURS
Status: DISCONTINUED | OUTPATIENT
Start: 2022-12-26 | End: 2022-12-27

## 2022-12-26 RX ADMIN — SODIUM CHLORIDE 250 ML: 9 INJECTION, SOLUTION INTRAVENOUS at 13:39

## 2022-12-26 RX ADMIN — APIXABAN 5 MG: 5 TABLET, FILM COATED ORAL at 20:26

## 2022-12-26 RX ADMIN — MIDODRINE HYDROCHLORIDE 10 MG: 5 TABLET ORAL at 09:15

## 2022-12-26 RX ADMIN — INSULIN ASPART 1 UNITS: 100 INJECTION, SOLUTION INTRAVENOUS; SUBCUTANEOUS at 09:39

## 2022-12-26 RX ADMIN — RIFAXIMIN 550 MG: 550 TABLET ORAL at 20:26

## 2022-12-26 RX ADMIN — CHLORHEXIDINE GLUCONATE 0.12% ORAL RINSE 15 ML: 1.2 LIQUID ORAL at 09:15

## 2022-12-26 RX ADMIN — Medication 1 DROP: at 21:10

## 2022-12-26 RX ADMIN — Medication 1 DROP: at 09:19

## 2022-12-26 RX ADMIN — MULTIVITAMIN 15 ML: LIQUID ORAL at 09:12

## 2022-12-26 RX ADMIN — LACOSAMIDE 100 MG: 10 SOLUTION ORAL at 20:59

## 2022-12-26 RX ADMIN — NYSTATIN 500000 UNITS: 100000 SUSPENSION ORAL at 21:11

## 2022-12-26 RX ADMIN — MELATONIN TAB 3 MG 6 MG: 3 TAB at 20:26

## 2022-12-26 RX ADMIN — OXYCODONE HYDROCHLORIDE 2.5 MG: 5 TABLET ORAL at 20:26

## 2022-12-26 RX ADMIN — IPRATROPIUM BROMIDE AND ALBUTEROL SULFATE 3 ML: .5; 3 SOLUTION RESPIRATORY (INHALATION) at 10:19

## 2022-12-26 RX ADMIN — Medication 40 MG: at 20:26

## 2022-12-26 RX ADMIN — EPOETIN ALFA-EPBX 10000 UNITS: 10000 INJECTION, SOLUTION INTRAVENOUS; SUBCUTANEOUS at 09:27

## 2022-12-26 RX ADMIN — LACOSAMIDE 100 MG: 10 SOLUTION ORAL at 09:11

## 2022-12-26 RX ADMIN — LEVETIRACETAM 1000 MG: 100 SOLUTION ORAL at 12:30

## 2022-12-26 RX ADMIN — MIDODRINE HYDROCHLORIDE 10 MG: 5 TABLET ORAL at 12:30

## 2022-12-26 RX ADMIN — TACROLIMUS 1 MG: 5 CAPSULE ORAL at 20:26

## 2022-12-26 RX ADMIN — INSULIN GLARGINE 20 UNITS: 100 INJECTION, SOLUTION SUBCUTANEOUS at 09:40

## 2022-12-26 RX ADMIN — Medication 1 DROP: at 16:07

## 2022-12-26 RX ADMIN — RIFAXIMIN 550 MG: 550 TABLET ORAL at 09:15

## 2022-12-26 RX ADMIN — INSULIN ASPART 1 UNITS: 100 INJECTION, SOLUTION INTRAVENOUS; SUBCUTANEOUS at 12:06

## 2022-12-26 RX ADMIN — LACTULOSE 20 G: 20 SOLUTION ORAL at 21:10

## 2022-12-26 RX ADMIN — CHLORHEXIDINE GLUCONATE 0.12% ORAL RINSE 15 ML: 1.2 LIQUID ORAL at 20:26

## 2022-12-26 RX ADMIN — MIDODRINE HYDROCHLORIDE 10 MG: 5 TABLET ORAL at 18:13

## 2022-12-26 RX ADMIN — NYSTATIN 500000 UNITS: 100000 SUSPENSION ORAL at 09:15

## 2022-12-26 RX ADMIN — MICAFUNGIN SODIUM 100 MG: 50 INJECTION, POWDER, LYOPHILIZED, FOR SOLUTION INTRAVENOUS at 17:46

## 2022-12-26 RX ADMIN — LACTULOSE 20 G: 20 SOLUTION ORAL at 16:08

## 2022-12-26 RX ADMIN — NYSTATIN 500000 UNITS: 100000 SUSPENSION ORAL at 12:30

## 2022-12-26 RX ADMIN — NYSTATIN 500000 UNITS: 100000 SUSPENSION ORAL at 18:13

## 2022-12-26 RX ADMIN — CEFTRIAXONE SODIUM 2 G: 2 INJECTION, POWDER, FOR SOLUTION INTRAMUSCULAR; INTRAVENOUS at 14:22

## 2022-12-26 RX ADMIN — SODIUM CHLORIDE 300 ML: 9 INJECTION, SOLUTION INTRAVENOUS at 13:00

## 2022-12-26 RX ADMIN — Medication 5 ML: at 09:12

## 2022-12-26 RX ADMIN — INSULIN ASPART 2 UNITS: 100 INJECTION, SOLUTION INTRAVENOUS; SUBCUTANEOUS at 00:46

## 2022-12-26 RX ADMIN — Medication 40 MG: at 09:12

## 2022-12-26 RX ADMIN — QUETIAPINE FUMARATE 50 MG: 50 TABLET ORAL at 21:11

## 2022-12-26 RX ADMIN — IPRATROPIUM BROMIDE AND ALBUTEROL SULFATE 3 ML: .5; 3 SOLUTION RESPIRATORY (INHALATION) at 15:15

## 2022-12-26 RX ADMIN — INSULIN ASPART 2 UNITS: 100 INJECTION, SOLUTION INTRAVENOUS; SUBCUTANEOUS at 04:43

## 2022-12-26 RX ADMIN — INSULIN ASPART 2 UNITS: 100 INJECTION, SOLUTION INTRAVENOUS; SUBCUTANEOUS at 20:31

## 2022-12-26 RX ADMIN — IPRATROPIUM BROMIDE AND ALBUTEROL SULFATE 3 ML: .5; 3 SOLUTION RESPIRATORY (INHALATION) at 20:06

## 2022-12-26 RX ADMIN — TACROLIMUS 1 MG: 5 CAPSULE ORAL at 09:12

## 2022-12-26 RX ADMIN — PREDNISONE 10 MG: 10 TABLET ORAL at 09:19

## 2022-12-26 RX ADMIN — IPRATROPIUM BROMIDE AND ALBUTEROL SULFATE 3 ML: .5; 3 SOLUTION RESPIRATORY (INHALATION) at 07:14

## 2022-12-26 RX ADMIN — APIXABAN 5 MG: 5 TABLET, FILM COATED ORAL at 09:15

## 2022-12-26 RX ADMIN — FOLIC ACID 1 MG: 1 TABLET ORAL at 09:15

## 2022-12-26 RX ADMIN — LACTULOSE 20 G: 20 SOLUTION ORAL at 09:15

## 2022-12-26 ASSESSMENT — ACTIVITIES OF DAILY LIVING (ADL)
ADLS_ACUITY_SCORE: 47
ADLS_ACUITY_SCORE: 46
ADLS_ACUITY_SCORE: 47
ADLS_ACUITY_SCORE: 47
ADLS_ACUITY_SCORE: 51
ADLS_ACUITY_SCORE: 47

## 2022-12-26 NOTE — PLAN OF CARE
Neuro: Pt is a & O x4. CAM negative. Follows commands. Hard to understand when lip reading. Does not complain of pain.   CV: ST. VSS.   Pulm: CMV  25%/16/455 for pat of shift then P/S 5/5 for 1 hour and then switched to Trach Dome 40%/40 L. Coarse LS   GI/: Tolerating TF. Hyperactive BS. Dialysis this afternoon.   Skin: see wound LDA.  Plan: Wife and brother updated throughout shift, dialysis today. Care coordinator working on authorization for placement.

## 2022-12-26 NOTE — PROGRESS NOTES
[facility] ICU RESPIRATORY NOTE           Date of Admission: 12/16/2022     Date of Intubation (most recent):  Chronic trach     Reason for Mechanical Ventilation: Resp failure     Number of Days on Mechanical Ventilation: 11     Met Criteria for Spontaneous Breathing Trial: Yes     Significant Events Today: None overnight     ABG Results:   Recent Labs   Lab 12/20/22  0719   PH 7.43   PCO2 40   PO2 118*   HCO3 27   O2PER 30     Vent Mode: CMV/AC  (Continuous Mandatory Ventilation/ Assist Control)  FiO2 (%): 25 %  Resp Rate (Set): 16 breaths/min  Tidal Volume (Set, mL): 450 mL  PEEP (cm H2O): 5 cmH2O  Pressure Support (cm H2O): 10 cmH2O  Resp: 20    YSABEL Brantley, RRT

## 2022-12-26 NOTE — CONSULTS
Infectious Disease Consultation     Date of Admission:  12/16/2022  Date of Consult : 12/26/22    Assessment:  57 YM with hx of liver transplantation 2016, recent prolonged hopsitalzied from 11/12-12/15 for PEA cardiac arrest pneumococcal/parainfluenza pneumonia with ARDS, septic shock with multiorgan failure, now on HD, s/p trach/PEG , right pneumothorax requiring chest tube . He was discharged to [facility] with a chest tube and has now been hospitalized with PEA arrest and seizures as well as GI bleed , new left pneumothorax requiring chest tube . 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 Grew after 5 days in broth cx only, may be a contaminant. However given complicated course, will treat.  -SBP related to lactobacillus sp, no evidence of bowel perforation.  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 Patient is not encephalopathic  -New onset seizure with evidence of CNS embolic infarcts of unclear source.Has had multiple cardiac arrests, seizures may be related to that, don't think HHV6 is cause of seizures  -Hx of liver transplantation, on tacrolimus  -Recent prolonged hospitalization for pneumococcal sepsis , pneumonia, ARDS, septic shock, multiorgan failure  -s/p Trach/PEGa  -PEA cardiac arrest last month and again prior to admission  -HD dependent    Recommendations:  1. Check blood cxs X 2 sets  2. HHV6 from CSF sample does not require treatment . CSF cell count is stable and patient does not have clinical encephalitis. Does not have documented viremia, check quantitative serum HHV6  3. Discontinue Levaquin. Treat lactobacillus peritonitis with Ceftriaxone, which can be transitioned to oral Augmentin at discharge  4. micafungin for candida isolation in left pleural cultures.  Adding on sensitivities, generally candida parapsilosis is fluconazole sensitive    ID will continue to follow  Discussed with the ICU team    Tori Hernández MD    Reason for Consult    I was asked to evaluate this patient for multiple infectious complications    Primary Care Physician   Ki Powers    Chief Complaint   Candida empyema and bacterial peritonitis    History is obtained from the patient and medical records    History of Present Illness   Blanco Osborne is a 57 year old male with hx of liver transplantation 2016 with recent prolonged hopsitalzied from 11/12-12/15 for PEA cardiac arrest pneumococcal/parainfluenza pneumonia with ARDS, septic shock with multiorgan failure, now on HD, s/p trach/PEG and course was complicated by right pneumothorax requiring chest tube . He was discharged to [facility] with a chest tube and has now been hospitalized with PEA arrest and seizures as well as GI bleed , new left pneumothorax requiring chest tube drainage    Blood cxs are negative, left pleural fluid cxs are growing candida parapsilosis and paracentesis fluid is growing lactobacillus. CSF cell count is not consistent with aseptic meningitis normal cell count. Meningitis panel is positive for HHV6.      Patient is on Ganciclovir and levaquin. He is alert this morning, denies abdominal pain or headache, no further seizures, is undergoing HD    Past Medical History   I have reviewed this patient's medical history and updated it with pertinent information if needed.   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  Past Surgical History:   Procedure Laterality Date     APPENDECTOMY       BENCH LIVER N/A 2016    Procedure: BENCH LIVER;  Surgeon: Enoc Crews MD;  Location: [facility] OR     BRONCHOSCOPY FLEXIBLE AND RIGID N/A 2022    Procedure: BRONCHOSCOPY;  Surgeon: Alena Valenzuela MD;  Location:  GI     COLONOSCOPY  13    repeat in 2018     COLONOSCOPY N/A 10/4/2019    Procedure: COLONOSCOPY, WITH POLYPECTOMY AND BIOPSY;  Surgeon: Go Chong MD;  Location: [facility] OR     HERNIA REPAIR       IR CHEST TUBE PLACEMENT NON-TUNNELED RIGHT  2022     IR CVC TUNNEL PLACEMENT > 5 YRS OF AGE  2022     IR GASTROSTOMY TUBE PERCUTANEOUS PLCMNT  2022     IR PARACENTESIS  2022     IR PARACENTESIS  2022     IR THORACENTESIS  2022     IR TRANSCATHETER BIOPSY  2022     TRACHEOSTOMY N/A 2022    Procedure: TRACHEOSTOMY;  Surgeon: Nilesh Jackson MD;  Location:  OR     TRANSPLANT LIVER RECIPIENT  DONOR N/A 2016    Procedure: TRANSPLANT LIVER RECIPIENT  DONOR;  Surgeon: Enoc Crews MD;  Location: [facility] OR       Prior to Admission Medications   Prior to Admission Medications   Prescriptions Last Dose Informant Patient Reported? Taking?   Multiple Vitamins-Minerals (MULTIVITAMINS W/MINERALS) liquid 2022 at am  No Yes   Sig: 15 mLs by Per Feeding Tube route daily   acetylcysteine (MUCOMYST) 20 % neb solution 2022 at 1100  No Yes   Sig: Take 2 mLs by nebulization 2 times daily   albuterol (PROVENTIL) (2.5 MG/3ML) 0.083% neb solution Unknown  No Yes   Sig: Take 1 vial (2.5 mg) by  nebulization every 2 hours as needed for shortness of breath   apixaban ANTICOAGULANT (ELIQUIS) 5 MG tablet 12/15/2022 at 2101  No Yes   Si tablet (5 mg) by Oral or Feeding Tube route 2 times daily   calcium carbonate (TUMS) 500 MG chewable tablet Unknown  Yes Yes   Sig: Take 1-2 chew tab by mouth 4 times daily as needed for heartburn   carboxymethylcellulose PF (REFRESH PLUS) 0.5 % ophthalmic solution 2022 at am  No Yes   Sig: Place 1 drop into both eyes 3 times daily   epoetin mirta-epbx (RETACRIT) 06664 UNIT/ML injection   No No   Sig: Inject 1 mL (10,000 Units) Subcutaneous once for 1 dose   famotidine (PEPCID) 20 MG tablet Unknown  Yes Yes   Sig: Take 20 mg by mouth 2 times daily as needed (heartburn)   folic acid 5 MG/ML injection 2022 at am  No Yes   Sig: Inject 0.2 mLs (1 mg) into the vein daily   glucose 40 % (400 mg/mL) gel Unknown  No Yes   Sig: Take 15-30 g by mouth every 15 minutes as needed for low blood sugar   hydrocortisone sodium succinate PF (SOLU-CORTEF) 100 MG injection 2022 at 1132  No Yes   Sig: Inject 0.5 mLs (25 mg) into the vein every 6 hours   insulin glargine (LANTUS PEN) 100 UNIT/ML pen 2022 at am  No Yes   Sig: Inject 30 Units Subcutaneous every morning (before breakfast)   ipratropium - albuterol 0.5 mg/2.5 mg/3 mL (DUONEB) 0.5-2.5 (3) MG/3ML neb solution 2022 at 1100  No Yes   Sig: Take 1 vial (3 mLs) by nebulization 4 times daily   lactulose (CHRONULAC) 10 GM/15ML solution 2022 at am  No Yes   Si mLs (20 g) by Oral or Feeding Tube route 3 times daily   midodrine (PROAMATINE) 10 MG tablet 2022 at 1136  No Yes   Si tablet (10 mg) by Oral or Feeding Tube route 3 times daily (with meals)   nystatin (MYCOSTATIN) 181603 UNIT/ML suspension 2022 at am  No Yes   Sig: Swish and swallow 5 mLs (500,000 Units) in mouth 4 times daily   ondansetron (ZOFRAN ODT) 4 MG ODT tab Unknown  No Yes   Sig: Take 1 tablet (4 mg) by mouth every 6 hours  as needed for nausea or vomiting   order for DME   No No   Sig: Equipment being ordered: Class 2 (30-40mmHg) compression knee high stockings, night time knee high compression alternative, bandaging supplies   Patient not taking: Reported on 2019   order for DME   No No   Sig: Equipment being ordered: Compression knee high stockings for B LE lymphedema 20-30mmHg   Patient not taking: Reported on 2019   oxyCODONE (ROXICODONE) 5 MG tablet Unknown  No Yes   Si tablet (5 mg) by Per Feeding Tube route every 4 hours as needed for moderate pain (4-6)   pantoprazole (PROTONIX) 2 mg/mL SUSP suspension 2022 at am  No Yes   Si mLs (40 mg) by Per Feeding Tube route every morning (before breakfast)   rifaximin (XIFAXAN) 550 MG TABS tablet 2022 at am  No Yes   Si tablet (550 mg) by Per Feeding Tube route 2 times daily   tacrolimus (GENERIC EQUIVALENT) 1 mg/mL suspension 2022 at am  No Yes   Si mL (1 mg) by Oral or Feeding Tube route 2 times daily      Facility-Administered Medications: None     Allergies   No Known Allergies    Immunization History   Immunization History   Administered Date(s) Administered     COVID-19 Vaccine 18+ (Moderna) 2021, 2021     Influenza Vaccine 50-64 or 18-64 w/egg allergy (Flublok) 10/07/2019     Influenza Vaccine >6 months (Alfuria,Fluzone) 10/31/2017     Pneumo Conj 13-V (2010&after) 07/10/2018     Twinrix A/B 2016       Social History   I have reviewed this patient's social history and updated it with pertinent information if needed. Blanco Osborne  reports that he has never smoked. He has never used smokeless tobacco. He reports that he does not currently use alcohol. He reports that he does not use drugs.    Family History   I have reviewed this patient's family history and updated it with pertinent information if needed.   No family history on file.    Review of Systems   The 10 point Review of Systems is difficult to obtain due to  patient factors    Physical Exam   Temp: 98.5  F (36.9  C) Temp src: Oral BP: (!) 145/98 Pulse: (!) 130   Resp: 16 SpO2: 97 % O2 Device: Mechanical Ventilator    Vital Signs with Ranges  Temp:  [98.1  F (36.7  C)-99.6  F (37.6  C)] 98.5  F (36.9  C)  Pulse:  [] 130  Resp:  [0-26] 16  BP: ()/(51-98) 145/98  FiO2 (%):  [25 %] 25 %  SpO2:  [97 %-100 %] 97 %  212 lbs 1.32 oz  Body mass index is 28.76 kg/m .    GENERAL APPEARANCE:  Awake, trach in place  EYES: Eyes grossly normal to inspection  NECK: no adenopathy  RESP: lungs clear   CV: S1S2  ABDOMEN: large ascites  MSKIN: no  rash      Data   All laboratory data reviewed    Component      Latest Ref Rng & Units 12/26/2022   WBC      4.0 - 11.0 10e3/uL 4.8   RBC Count      4.40 - 5.90 10e6/uL 2.54 (L)   Hemoglobin      13.3 - 17.7 g/dL 8.1 (L)   Hematocrit      40.0 - 53.0 % 26.1 (L)   MCV      78 - 100 fL 103 (H)   MCH      26.5 - 33.0 pg 31.9   MCHC      31.5 - 36.5 g/dL 31.0 (L)   RDW      10.0 - 15.0 % 18.1 (H)   Platelet Count      150 - 450 10e3/uL 55 (L)     Component      Latest Ref Rng & Units 12/26/2022   Sodium      133 - 144 mmol/L 136   Potassium      3.4 - 5.3 mmol/L 3.7   Chloride      94 - 109 mmol/L 101   Carbon Dioxide      20 - 32 mmol/L 26   Anion Gap      3 - 14 mmol/L 9   Urea Nitrogen      7 - 30 mg/dL 62 (H)   Creatinine      0.66 - 1.25 mg/dL 3.08 (H)   Calcium      8.5 - 10.1 mg/dL 8.2 (L)   Glucose      70 - 99 mg/dL 231 (H)   Albumin      3.4 - 5.0 g/dL 1.9 (L)   Phosphorus      2.5 - 4.5 mg/dL 3.1   GFR Estimate      >60 mL/min/1.73m2 23 (L)     Component      Latest Ref Rng & Units 12/22/2022   Color Fluid      Colorless, Yellow Yellow   Appearance Fluid      Clear Cloudy (A)   Total Nucleated Cells      /uL 94   Cell Count Fluid Source       Abdomen   % Neutrophils Fluid      % 41   % Lymphocytes Fluid      % 18   % Mono/Macro Fluid      % 33   % Lining Cells      % 8   Albumin Fluid Source       Abdomen   Albumin Fluid       g/dL 0.3   Protein Fluid Source       Abdomen   Protein Total Fluid      g/dL 0.9   LD Fluid Source       Abdomen   Lactate Dehydrogenase Fluid      U/L 56     Component      Latest Ref Rng & Units 12/21/2022   Escherichia coli K1      Negative Negative   Haemophilus influenzae      Negative Negative   Listeria monocytogenes      Negative Negative   Neisseria meningitidis      Negative Negative   Streptococcus agalactiae (GBS)      Negative Negative   Streptococcus pneumoniae      Negative Negative   Cytomegalovirus      Negative Negative   Enterovirus by PCR      Negative Negative   Herpes Simplex Virus 1      Negative Negative   Herpes Simplex Virus 2      Negative Negative   Human Herpes Virus 6      Negative Positive (A)   Human Parechovirus      Negative Negative   Varicella Zoster Virus      Negative Negative   Cryptococcus neoformans/gattii      Negative Negative   Herpes Simplex Type 1 CSF      Not Detected Not Detected   Herpes Simplex Type 2 CSF      Not Detected Not Detected   T.Pallidum (VDRL) CSF Reflex      Non Reactive Non Reactive     Component      Latest Ref Rng & Units 12/21/2022   Tube Number       4   Color CSF      Colorless Colorless   Appearance CSF      Clear Clear   Total Nucleated Cells      0 - 5 /uL 3   RBC CSF      0 - 2 /uL 58 (H)   Glucose CSF      40 - 70 mg/dL 69   Protein Total CSF      15 - 60 mg/dL 71 (H)     Microbiology  12/17 L pleural cavity  Pleural Cavity, Left; Pleural fluid    0 Result Notes  Culture Isolated in broth only Candida parapsilosis complex         12/22 abdominal ascites  Abdomen; Ascites Fluid    0 Result Notes  Culture Culture in progress       Isolated in broth only Lactobacillus species Abnormal         12/16 blood cx negative    Imaging  12/19 CT abdomen pelvis  CT ABDOMEN/PELVIS WITHOUT CONTRAST December 19, 2022 12:49 PM     CLINICAL HISTORY: Abdominal pain. Lower abdominal/pelvic pain in  patient status post liver transplant. Urinary issue versus  obstipation  versus ascites.      TECHNIQUE: CT scan of the abdomen and pelvis was performed without IV  contrast. Multiplanar reformats were obtained. Dose reduction  techniques were used.  CONTRAST: None.     COMPARISON: November 12, 2022.     FINDINGS:   LOWER CHEST: Pleural catheter in place on the left. Minimal left and  small right effusions with associated compressive atelectasis and/or  infiltrate at the lung bases. Hyperdense nodular densities partially  visualized on the right.     HEPATOBILIARY: No significant mass or bile duct dilatation.  Cholecystectomy. Contour of the liver is similar.     PANCREAS: No significant mass, duct dilatation, or inflammatory  change.     SPLEEN: Splenomegaly at 18.2 cm.     ADRENAL GLANDS: No significant nodules.     KIDNEYS/BLADDER: 4 mm stone in the inferior left kidney. Stone in the  upper right kidney measures 5 mm. No ureteral stones or  hydronephrosis.     BOWEL: No obstruction or inflammatory change.     VASCULATURE: No abdominal aortic aneurysm.     PELVIC ORGANS: No pelvic masses.     OTHER: Moderate to large amount of ascites.     MUSCULOSKELETAL: No frankly destructive bony lesions.                                                                      IMPRESSION:   1.  Moderate to large amount of ascites. Ascites increased from  previous.  2.  No bowel obstruction or significant stool burden demonstrated.  3.  Improved pleural effusions and associated compressive atelectasis  and/or infiltrate since comparison.        12/17 CT chest WO contrast  EXAM: CT CHEST W/O CONTRAST  LOCATION: [facility]  DATE/TIME: 12/17/2022 1:32 PM     INDICATION: Bilateral chest tube in place. hx of ? left hemothorax  COMPARISON: Chest radiograph 12/16/2022 CT 12/10/2022  TECHNIQUE: CT chest without IV contrast. Multiplanar reformats were obtained. Dose reduction techniques were used.  CONTRAST: None.     FINDINGS:   LUNGS AND PLEURA: Stable position of right  chest tube with hyperdense tract into the right upper lobe with more focal hyperdense opacity measuring up to 6.0 cm (series 4 image 130), favor intraparenchymal hematoma. Persistent moderate right pleural   effusion with possible small volume internal blood products. Left chest tube is also in unchanged position small residual hydropneumothorax, pneumothorax component measures 6 mm at the apex (coronal image 71). Left pleural fluid appears slightly   loculated at the lung base, small volume internal blood products are possible. Stable position of tracheostomy tube, tip at the level of the mid thoracic trachea.     MEDIASTINUM/AXILLAE: No lymphadenopathy. No thoracic aortic aneurysm. Right internal jugular approach central venous catheter tip in the right atrium.     CORONARY ARTERY CALCIFICATION: Moderate.     UPPER ABDOMEN: Prior liver transplant. Persistent splenomegaly. Small volume ascites. Percutaneous gastrostomy tube partially visualized.     MUSCULOSKELETAL: Multiple subacute-appearing bilateral rib fractures, unchanged in hindsight since prior exam.                                                                         IMPRESSION:   1.  Stable position of right chest tube with likely associated intraparenchymal hematoma and moderate hemothorax.  2.  Stable position of left chest tube with small residual pneumothorax component. Associated pleural fluid appears slightly loculated, small volume hemothorax is possible.  3.  Unchanged subacute-appearing bilateral rib fractures.     12/20 MRI brain  MRI OF THE BRAIN WITHOUT AND WITH CONTRAST 12/20/2022 3:15 PM      COMPARISON: Head CT 12/20/2022.     HISTORY:  New onset seizure, focal deficits on exam (left upper  extremity paresis).      TECHNIQUE: Axial diffusion-weighted with ADC map, axial T2-weighted  with fat saturation, axial T1-weighted, axial turboFLAIR and coronal  T1-weighted images of the brain were acquired without intravenous  contrast.  Following  intravenous administration of gadolinium (9 mL  Gadavist), axial T1-weighted images of the brain were acquired.      FINDINGS: There are tiny recent ischemic infarcts in the left frontal  lobe, left temporal lobe and posterior right frontal lobe consistent  with embolic infarcts from a central embolic source.     There is moderate diffuse cerebral volume loss. There are minimal  patchy periventricular areas of abnormal T2 signal hyperintensity in  the cerebral white matter bilaterally that are consistent with sequela  of chronic small vessel ischemic disease. A small area of chronic  encephalomalacia at the high posterior right parietal lobe is again  noted possibly due to old traumatic or ischemic injury.     The ventricles and basal cisterns are within normal limits in  configuration given the degree of cerebral volume loss.  There is no  midline shift.  There are no extra-axial fluid collections.  There is  no evidence for acute intracranial hemorrhage.  There is no abnormal  contrast enhancement in the brain or its coverings.     There is no sinusitis or mastoiditis.                                                                      IMPRESSION:   1. Scattered tiny recent ischemic infarcts in the cerebral hemispheres  bilaterally consistent with embolic infarcts from a central embolic  source.  2. Diffuse cerebral volume loss and cerebral white matter changes  consistent with chronic small vessel ischemic disease.

## 2022-12-26 NOTE — PROGRESS NOTES
Comprehensive Daily ICU Note        Blanco Osborne MRN# [id number]   Age: 57 year old YOB: 1965     Date of Admission: 12/16/2022    Primary care provider: Ki Powers     CODE STATUS: FULL      Problem List:         Principal Problem:    Pneumothorax on left  Active Problems:    Cirrhosis of liver (H)    Liver transplanted (H)    Acute kidney injury (H)    Embolic stroke (H)    Encephalopathy    Chronic kidney disease, stage V (H)    Cardiac arrest (H)    Seizures (H)    Human herpesvirus 6 viremia             Treatment goals for next 24 hours:   Trach dome trials  Decrease AM lantus to 20   HD today   Stop HC.  change to prednisone 10   ID consult for HHV 6 therapy         Subjective/ Last 24 hours:   Did 4 hrs on PS 5          Mechanical Ventilation/Vitalsigns/IsandOs:       Temp:  [98  F (36.7  C)-99.6  F (37.6  C)] 99.4  F (37.4  C)  Pulse:  [] 86  Resp:  [0-22] 17  BP: ()/(51-86) 105/70  FiO2 (%):  [25 %] 25 %  SpO2:  [92 %-100 %] 100 %      Vent Mode: CMV/AC  (Continuous Mandatory Ventilation/ Assist Control)  FiO2 (%): 25 %  Resp Rate (Set): 16 breaths/min  Tidal Volume (Set, mL): 450 mL  PEEP (cm H2O): 5 cmH2O  Pressure Support (cm H2O): 10 cmH2O  Resp: 17             Physical Examination:     General: lying in bed , awake and tried to talk   Very debilitated, follows commands and  can weakly squeeze hands and wiggle toes but can't lift feet off the bed   HEENT: Healing tracheostomy site  Lungs: clear anteriorly  CVS: Regular rate rhythm  Abdomen: Clean PEG site,  protuberant but not tense,  nontender  Extremities/musculoskeletal: Cachectic, extensive ecchymoses on skin especially upper chest and arms   Neurology: Patient awake nods yes and no to questions.           Feeding/Glucose:     Orders Placed This Encounter      NPO for Medical/Clinical Reasons Except for: Ice Chips, Meds        Recent Labs   Lab 12/26/22  0443 12/26/22  0435 12/26/22  0044 12/25/22  1944  12/25/22  1541 12/25/22  1118   * 231* 201* 109* 174* 169*                Medications:       - MEDICATION INSTRUCTIONS for Dialysis Patients -   Does not apply See Admin Instructions     sodium chloride 0.9%  300 mL Hemodialysis Machine Once     sodium chloride (PF) 0.9%  10 mL Intracatheter During Dialysis/CRRT (from stock)     sodium chloride (PF) 0.9%  10 mL Intracatheter During Dialysis/CRRT (from stock)     apixaban ANTICOAGULANT  5 mg Oral or Feeding Tube BID     B and C vitamin Complex with folic acid  5 mL Per Feeding Tube Daily     carboxymethylcellulose PF  1 drop Both Eyes TID     chlorhexidine  15 mL Mouth/Throat Q12H     epoetin mirta-epbx  10,000 Units Subcutaneous Once per day on Mon Wed Fri     folic acid  1 mg Oral or Feeding Tube Daily     ganciclovir (CYTOVENE) intermittent infusion  1.25 mg/kg Intravenous Q Mon Wed Fri AM     insulin aspart  1-6 Units Subcutaneous Q4H     insulin glargine  20 Units Subcutaneous Daily     ipratropium - albuterol 0.5 mg/2.5 mg/3 mL  3 mL Nebulization 4x daily     lacosamide  100 mg Oral or Feeding Tube BID     lactulose  20 g Oral or Feeding Tube TID     levETIRAcetam  1,000 mg Oral or Feeding Tube Q24H     levofloxacin  500 mg Intravenous Q48H     midodrine  10 mg Oral or Feeding Tube TID w/meals     multivitamins w/minerals  15 mL Per Feeding Tube Daily     nystatin  500,000 Units Swish & Swallow 4x Daily     pantoprazole  40 mg Per Feeding Tube Q12H     predniSONE  10 mg Per NG tube Daily     QUEtiapine  50 mg Oral or Feeding Tube At Bedtime     rifaximin  550 mg Per Feeding Tube BID     tacrolimus  1 mg Oral or Feeding Tube BID          dextrose                Labs:         ROUTINE ICU LABS (Last four results)  CMP  Recent Labs   Lab 12/26/22  0443 12/26/22  0435 12/26/22  0044 12/25/22  1944 12/25/22  0719 12/25/22  0525 12/24/22  0735 12/24/22  0551 12/23/22  1511 12/23/22  1212 12/23/22  0901 12/23/22  0401 12/22/22  2038 12/22/22  1652 12/22/22  1612  12/22/22  1108 12/21/22  0406 12/21/22  0405 12/20/22  5872 12/20/22  4296   NA  --  136  --   --   --   --   --  137  --   --   --  135  --   --   --  133  --  130*  --  131*   POTASSIUM  --  3.7  --   --   --  3.5  --  3.6  --  3.6  --  3.3*  --   --   --  3.3*  --  3.8  --  3.8   CHLORIDE  --  101  --   --   --   --   --  102  --   --   --  101  --   --   --  99  --  95  --  96   CO2  --  26  --   --   --   --   --  25  --   --   --  26  --   --   --  26  --  24  --  28   ANIONGAP  --  9  --   --   --   --   --  10  --   --   --  8  --   --   --  8  --  11  --  7   * 231* 201* 109*   < >  --    < > 228*   < >  --    < > 205*   < >  --    < > 193*   < > 120*   < > 129*   BUN  --  62*  --   --   --   --   --  37*  --   --   --  48*  --   --   --  44*  --  55*  --  50*   CR  --  3.08*  --   --   --   --   --  2.25*  --   --   --  2.83*  --   --   --  2.45*  --  2.66*  --  2.22*   GFRESTIMATED  --  23*  --   --   --   --   --  33*  --   --   --  25*  --   --   --  30*  --  27*  --  34*   KELLY  --  8.2*  --   --   --   --   --  8.7  --   --   --  7.4*  --   --   --  7.6*  --  7.5*  --  7.6*   MAG  --  2.0  --   --   --   --   --  1.9  --   --   --  1.8  --   --   --   --   --   --   --   --    PHOS  --  3.1  --   --   --  2.9  --  2.1*  --   --   --  4.3  --   --   --  4.4  --  5.8*   < >  --    PROTTOTAL  --   --   --   --   --   --   --   --   --   --   --  4.6*  --   --   --  4.9*  --  4.7*  --  5.0*   ALBUMIN  --  1.9*  --   --   --   --   --  2.5*  --   --   --  1.9*  --  2.1*  --  2.1*  --  2.0*  1.9*  --  2.0*   BILITOTAL  --   --   --   --   --   --   --   --   --   --   --  0.6  --   --   --  0.8  --  1.1  --  1.8*   ALKPHOS  --   --   --   --   --   --   --   --   --   --   --  198*  --   --   --  163*  --  133  --  147   AST  --   --   --   --   --   --   --   --   --   --   --  14  --   --   --  16  --  20  --  20   ALT  --   --   --   --   --   --   --   --   --   --   --  17  --   --   --  17  --   17  --  19    < > = values in this interval not displayed.     CBC  Recent Labs   Lab 12/26/22  0436 12/24/22  1808 12/23/22  0401 12/22/22  1108   WBC 4.8 5.8 5.3 5.5   RBC 2.54* 2.68* 2.57* 2.67*   HGB 8.1* 8.4* 8.2* 8.5*   HCT 26.1* 28.4* 26.6* 26.5*   * 106* 104* 99   MCH 31.9 31.3 31.9 31.8   MCHC 31.0* 29.6* 30.8* 32.1   RDW 18.1* 18.0* 17.9* 18.2*   PLT 55* 58* 76* 72*     INR  Recent Labs   Lab 12/21/22  1315   INR 1.36*     Arterial Blood Gas  Recent Labs   Lab 12/20/22  0719   PH 7.43   PCO2 40   PO2 118*   HCO3 27   O2PER 30           Cultures:      Recent Labs   Lab 12/22/22  1318 12/22/22  1315 12/21/22  1526   CULTURE No growth after 3 days Culture in progress  Isolated in broth only Gram positive bacilli* No Growth  No growth after 4 days             Imaging/Other results:     No results found for this or any previous visit (from the past 24 hour(s)).                    Assessment and Plan:     57 year old male with paroxysmal A. fib, Liver transplant (2016), CHRISTIAN on BiPAP, h/o CVA and hypertension. Patient  s/p Trach/PEG following acu had Rt sided Chest tube placed for hydroptx on 12/12/22 and  transferred to [facility] on 12/14/22 for CIP.  Pt  noted to have bloody stool on 12/15 and 12/16 and had left Ptx which required left sided chest tube leading to ~900ml of bloody output. Patient with PEA arrest 12/20/22 and then seizure while on HD.      Overall improved and getting ready to go back to [facility]     Neurology and Psychiatry:  1. Seizure seconday to strokes and HHV 6 --on vimpat and keppra changed to enteral Sunday   2.  Baseline multifactorial encephalopathy secondary to his ongoing critical illness and prior cardiac arrests and prior strokes and cirrhosis with hepatic encephalopathy.--but seems fairly clear in last few days  - MRI head from 12/20/22 -No PRES or leptomeningeal enhancement but scattered embolic tiny infarcts   -  LP 12/22/22- Only +ve for HHV6  3. H/o Embolic CVA: Elliquis  restarted   Plan:  1.  Ganciclovir  3. Keppra and vimpat change to enteral dosing       Pulmonary:  PEA/respiratory arrest prior to admission. Bronchoscopy 12/20/22-  S/P Trach (11/29) after failed extubation X 2. New Left Ptx with acute on hypoxic respiratory failure and ? hemithorax: Pigtail placed on 12/16/2022 and significant decrease in volume of left pneumothorax.  Chest tube was removed on 1219   hx of right hydropneumothorax:    Plan:  1. Advance to [facility]      Cardiovascular system:  PEA arrest again prior to this admission. has had a prior in the context of respiratory arrest at the beginning of his previous admission.  No structural cardiac disease but does have A. Fib.  Anticoagulation been restarted although high risk for bleeding seconday to thrombocytopenia   Plan:  Continue Eliquis   Stop stress dose hydrocortisone     Renal/Electrolytes:  1. LORETTA: was on CRRT but now on iHD.  No return of renal function.  - MWF schedule- had HD session 12/23  tolerated 4000 UF  HD  Today        ID:  LP 12/21/22: HHV6 qualitative +ve     1. H/o Strep/Parainfluenza infection: Completed treatment course  2. 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   3. H/o Lip HSV: was treated with acyclovir recently-now with concern for HSV encephalitis given seizure but HSV from CSF is negative.   HHV6 positive at log 6 copies which seems quite elevated  Plan:  1. Ganciclovir IV-dosing for IHD started 12/22    Just notified of GP bacilli in broth only in paracentesis fluid from 4 days ago. The cell count of that fluid was very low.  94.   Day 2  renal dosing levofloxacin until speciation  ID consult to determine antiviral selection and duration for HHV6 encephalitis         GI/:    Post transplant cirrhosis.  hepatologist at [facility] felt that most likely reason for the cirrhosis was metabolic syndrome or alcohol intake.  There was no evidence of rejection.  Paracentesis done on 1222 with no positive results.     Plan:  3.  Intermittent paracentesis is needed.  4.  Getting 1 mg  tacrolimus BID   Change Hydrocortisone to prednisone 10 mg daily     Endocrine:  1. DM.  Glucoses running high as AM lantus held recently   Plan:  Change to 20 units lantus q AM plus sliding scale insulin       Nutrition:  At goal tube feeds via percutaneous  G tube tube    Heme/Onc:  1.  Pancytopenia; possibly related to cirrhosis--WBC improved . Chronic splenomegaly-present prior to liver tx but has not regressed.     Plan:  1.  Continue eliquis     ICU prophylaxis:      DVT; eliquis      Restraints needed: No     Stress ulcer: IV PPI     Central line: Needed today for IV access/Meds.    Code Status: Full    Family update:    PLAN  Trach dome trials  Continue levofloxacin  Stop HC   Prednisone 10  Lantus 20

## 2022-12-26 NOTE — CONSULTS
Care Management Initial Consult    General Information  Assessment completed with: Patient, Family, wife and brother  Type of CM/SW Visit: Offer D/C Planning    Primary Care Provider verified and updated as needed: Yes   Readmission within the last 30 days:        Reason for Consult: discharge planning  Advance Care Planning:            Communication Assessment  Patient's communication style:               Cognitive  Cognitive/Neuro/Behavioral: .WDL except  Level of Consciousness: alert  Arousal Level: opens eyes spontaneously  Orientation: other (see comments) (jayjay)  Mood/Behavior: flat affect  Best Language:  (jayjay)  Speech: trached    Living Environment:   People in home: spouse     Current living Arrangements:        Able to return to prior arrangements:         Family/Social Support:  Care provided by: self  Provides care for:    Marital Status:              Description of Support System:           Current Resources:   Patient receiving home care services:       Community Resources:    Equipment currently used at home: none  Supplies currently used at home:      Employment/Financial:  Employment Status:          Financial Concerns:             Lifestyle & Psychosocial Needs:  Social Determinants of Health     Tobacco Use: Low Risk      Smoking Tobacco Use: Never     Smokeless Tobacco Use: Never     Passive Exposure: Not on file   Alcohol Use: Not on file   Financial Resource Strain: Not on file   Food Insecurity: Not on file   Transportation Needs: Not on file   Physical Activity: Not on file   Stress: Not on file   Social Connections: Not on file   Intimate Partner Violence: Not on file   Depression: Not at risk     PHQ-2 Score: 0   Housing Stability: Not on file       Functional Status:  Prior to admission patient needed assistance:              Mental Health Status:          Chemical Dependency Status:                Values/Beliefs:  Spiritual, Cultural Beliefs, Denominational Practices, Values that affect  care:                 Additional Information:  Met with patient, wife, and brother at bedside to discuss role in discharge planning.  Pt recently at [facility] and transferred to [facility].  Prior to hospital stay Pt had been indep at baseline, no services in the home.   Per rounds Pt is on trach dome and will be stable to transfer back to [facility].     Family would like to see if [facility] has available beds as it is closer to their home and they could visit more often.  CC discussed concerns regarding [facility] authorization, and bed availability but did agree to check with [facility]  CC called [facility] and left message for Liaison to check on bed availability, message left.  Referral sent via DOD.     CC called [facility] and spoke to Liasion Kalyani who will review for return but understands that family wants to look at [facility] authorization will be needed prior to return.    CC to follow for return to [facility] (either [facility] or [facility])    Alethea Mclean RN

## 2022-12-26 NOTE — PROGRESS NOTES
Potassium   Date Value Ref Range Status   12/26/2022 3.7 3.4 - 5.3 mmol/L Final   04/20/2020 3.9 3.4 - 5.3 mmol/L Final     Hemoglobin   Date Value Ref Range Status   12/26/2022 8.1 (L) 13.3 - 17.7 g/dL Final   04/20/2020 14.3 13.3 - 17.7 g/dL Final     Creatinine   Date Value Ref Range Status   12/26/2022 3.08 (H) 0.66 - 1.25 mg/dL Final   04/20/2020 1.06 0.66 - 1.25 mg/dL Final     Urea Nitrogen   Date Value Ref Range Status   12/26/2022 62 (H) 7 - 30 mg/dL Final   04/20/2020 23 7 - 30 mg/dL Final     Sodium   Date Value Ref Range Status   12/26/2022 136 133 - 144 mmol/L Final   04/20/2020 139 133 - 144 mmol/L Final     INR   Date Value Ref Range Status   12/21/2022 1.36 (H) 0.85 - 1.15 Final   10/07/2019 1.20 (H) 0.86 - 1.14 Final       DIALYSIS PROCEDURE NOTE  Hepatitis status of previous patient on machine log was checked and verified ok to use with this patients hepatitis status.  Patient dialyzed for 3 hrs. on a K3 bath with a net fluid removal of  2L; reduced UF goal per neph MD.  A BFR of 350 ml/min was obtained via RCVC.  The treatment plan was discussed with Dr. Laboy,   during the treatment.    Total heparin received during the treatment: 0 units.   Line flushed, clamped and capped with heparin 1:1000 1.9 mL (1900 units) per lumen    Meds  given: None ordered  Complications: Lines reversed. During tx HD machine had low & High flow errors. Attempted multiple interventions with no success. Blood returned. Md notified. Patient re-started on new dialysis machine. See flowsheet for details.     ICEBOAT? Timeout performed pre-treatment  I: Patient was identified using 2 identifiers  C:  Consent Signed Yes  E: Equipment preventative maintenance is current and dialysis delivery system OK to use  B: Hepatitis B Surface Antigen: N; Draw Date: 12/10/22      Hepatitis B Surface Antibody: S; Draw Date: 12/10/22  O: Dialysis orders present and complete prior to treatment  A: Vascular access verified and  assessed prior to treatment  T: Treatment was performed at a clinically appropriate time  ?: Patient was allowed to ask questions and address concerns prior to treatment  See Adult Hemodialysis flowsheet in EPIC for further details and post assessment.  Machine water alarm in place and functioning. Transducer pods intact and checked every 15min.   Chlorine/Chloramine water system checked every 4 hours.    Patient repositioned every 2 hours during the treatment.  Post treatment report given to Gena FUENTES RN regarding 2L of fluid removed, last BP of 113/77, and patient pain rating of 4/10.

## 2022-12-26 NOTE — PROGRESS NOTES
Patient on trach dome 40LPM 40% since 1230, tolerating fine, SpO2: 100%, will continue to wean.     Moises Martinez, RRT on 12/26/2022 at 2:03 PM

## 2022-12-26 NOTE — PLAN OF CARE
Neuro:  Alert and follows commands, more alert in morning and says he is feeling better after rest.  CV: SR, generalized weakness,    Pulmonary: LS coarse, Trach in place, full vent support. Think white inline seretion. Very difficult to understand and communication is frustrating for him.  GI/: TF at goal, tolerate well. Rectal tube in for loose stool/lactulose. Anuric, dialysis pt HD M-W-Fr.  Lines/Gtt:  PICC x 3, R subclavian dialysis cath.TKO.

## 2022-12-26 NOTE — PROGRESS NOTES
Renal Medicine Progress Note            Assessment/Plan:     Assessment: Blanco Osborne is a 58 yo male with PMH CARRILLO cirrhosis s/p liver tx (2016), afib, HTN, CHRISTIAN, CVA and recent prolonged admission after PEA arrest s/p trach/PEG re-admitted 12/16/2022 with hematochezia and L hemothorax s/p CT.     Anuric LORETTA requiring dialysis    Due to PEA arrest. CRRT discontinued 11/26, IHD started 11/29, remains on MWF schedule. No signs of renal recovery yet, patient feels sensation to urinate. Will bladder scan today, though chance that ascites will show false positive. Helpful though to know if he is starting to make urine to assess for renal recovery.  -HD today, continue MWF schedule  - Bladder scan today    Anemia of renal disease  Hematochezia, resolved    Last transfusion 12/17. Will check iron studies to see if IV iron should be given during dialysis.   - iron studies pending  - epo 10K with dialysis   - GI consulted for poss GIB, hemoglobin stable no scope necessary.    Chronic hypotension  Possibly due to cirrhosis   - midodrine 10 mg TID     Acute on chronic hypoxic respiratory failure   S/p tracheostomy 11/29/22  Hemopneumothorax s/p L CT   H/o aspergillus PNA and multiple bacterial PNAs during last admission. Recent hemopneumothorax s/p L CT necessitating re-admission. Mucus plugging event 12/20 resulting in brief PEA arrest and severe hypoxia.       HHV 6 meningoencephalitis  Seizures  LUE focal weakness  Initial seizure after PEA arrest 12/20. Another seizure 12/21 during dialysis run after episode of mild hypotension (no hypoxia noted). Neurology following, LP showing HHV-6.  Initially acyclovir started switch to ganciclovir IV.  -Renally dose meds    CARRILLO cirrhosis s/p liver tx  Immunosuppression per GI.          Interval History:     - no acute events overnight  - pt on pressure support currently, will then trial trach dome   - he was awake and alert in chair today with wife and brother at bedside   - he  does feel slight shortness of breath   - he was sitting upright and using a lot of strength   - BP good, but slightly tachycardic today, unclear if due to PS and getting fatigued in chair   - feels he has to urinate but is unable to. Asked RN to bladder scan him. May be inaccurate given ascites, but he is willing to straight cath to see if he's producing urine           Medications and Allergies:       - MEDICATION INSTRUCTIONS for Dialysis Patients -   Does not apply See Admin Instructions     sodium chloride 0.9%  250 mL Intravenous Once in dialysis/CRRT     sodium chloride 0.9%  300 mL Hemodialysis Machine Once     sodium chloride 0.9%  300 mL Hemodialysis Machine Once     sodium chloride (PF) 0.9%  10 mL Intracatheter During Dialysis/CRRT (from stock)     sodium chloride (PF) 0.9%  10 mL Intracatheter During Dialysis/CRRT (from stock)     sodium chloride (PF) 0.9%  10 mL Intracatheter During Dialysis/CRRT (from stock)     sodium chloride (PF) 0.9%  10 mL Intracatheter During Dialysis/CRRT (from stock)     apixaban ANTICOAGULANT  5 mg Oral or Feeding Tube BID     B and C vitamin Complex with folic acid  5 mL Per Feeding Tube Daily     carboxymethylcellulose PF  1 drop Both Eyes TID     chlorhexidine  15 mL Mouth/Throat Q12H     epoetin mirta-epbx  10,000 Units Intravenous Once in dialysis/CRRT     epoetin mirta-epbx  10,000 Units Subcutaneous Once per day on Mon Wed Fri     folic acid  1 mg Oral or Feeding Tube Daily     ganciclovir (CYTOVENE) intermittent infusion  1.25 mg/kg Intravenous Q Mon Wed Fri AM     insulin aspart  1-6 Units Subcutaneous Q4H     insulin glargine  20 Units Subcutaneous Daily     ipratropium - albuterol 0.5 mg/2.5 mg/3 mL  3 mL Nebulization 4x daily     lacosamide  100 mg Oral or Feeding Tube BID     lactulose  20 g Oral or Feeding Tube TID     levETIRAcetam  1,000 mg Oral or Feeding Tube Q24H     levofloxacin  500 mg Intravenous Q48H     midodrine  10 mg Oral or Feeding Tube TID w/meals      multivitamins w/minerals  15 mL Per Feeding Tube Daily     - MEDICATION INSTRUCTIONS -   Does not apply Once     nystatin  500,000 Units Swish & Swallow 4x Daily     pantoprazole  40 mg Per Feeding Tube Q12H     predniSONE  10 mg Per NG tube Daily     QUEtiapine  50 mg Oral or Feeding Tube At Bedtime     rifaximin  550 mg Per Feeding Tube BID     tacrolimus  1 mg Oral or Feeding Tube BID      No Known Allergies         Physical Exam:   Vitals were reviewed  BP (!) 145/98   Pulse (!) 130   Temp 98.1  F (36.7  C) (Oral)   Resp 16   Wt 96.2 kg (212 lb 1.3 oz)   SpO2 97%   BMI 28.76 kg/m      Wt Readings from Last 3 Encounters:   12/26/22 96.2 kg (212 lb 1.3 oz)   12/16/22 100.2 kg (221 lb)   12/16/22 100.2 kg (221 lb)       Intake/Output Summary (Last 24 hours) at 12/20/2022 1146  Last data filed at 12/20/2022 1000  Gross per 24 hour   Intake 2360 ml   Output 1306 ml   Net 1054 ml       GENERAL APPEARANCE: trached, sitting in chair, fatigued appearing  HEENT:  Dry MM  RESP: intermittent crackles  CV: RRR  ABDOMEN: Distended but soft and nontender.    EXTREMITIES/SKIN: 2+ pitting LE edema  NEURO: Alert, oriented, mouthing communication  LINES: L CT, RIJ TDC clean and intact            Data:     BMP  Recent Labs   Lab 12/26/22  0939 12/26/22  0443 12/26/22  0435 12/26/22  0044 12/25/22  0719 12/25/22  0525 12/24/22  0735 12/24/22  0551 12/23/22  1511 12/23/22  1212 12/23/22  0901 12/23/22  0401 12/22/22  1612 12/22/22  1108   NA  --   --  136  --   --   --   --  137  --   --   --  135  --  133   POTASSIUM  --   --  3.7  --   --  3.5  --  3.6  --  3.6  --  3.3*  --  3.3*   CHLORIDE  --   --  101  --   --   --   --  102  --   --   --  101  --  99   KELLY  --   --  8.2*  --   --   --   --  8.7  --   --   --  7.4*  --  7.6*   CO2  --   --  26  --   --   --   --  25  --   --   --  26  --  26   BUN  --   --  62*  --   --   --   --  37*  --   --   --  48*  --  44*   CR  --   --  3.08*  --   --   --   --  2.25*  --   --    --  2.83*  --  2.45*   * 224* 231* 201*   < >  --    < > 228*   < >  --    < > 205*   < > 193*    < > = values in this interval not displayed.     CBC  Recent Labs   Lab 12/26/22  0436 12/24/22  1808 12/23/22  0401 12/22/22  1108   WBC 4.8 5.8 5.3 5.5   HGB 8.1* 8.4* 8.2* 8.5*   HCT 26.1* 28.4* 26.6* 26.5*   * 106* 104* 99   PLT 55* 58* 76* 72*     Lab Results   Component Value Date    AST 14 12/23/2022    ALT 17 12/23/2022    ALKPHOS 198 (H) 12/23/2022    BILITOTAL 0.6 12/23/2022    CHANDLER 32 12/20/2022     Lab Results   Component Value Date    INR 1.36 (H) 12/21/2022       Attestation:  I have reviewed today's vital signs, notes, medications, labs and imaging.    Dwayne Laboy MD  [facility] - Nephrology  Office: [phone]

## 2022-12-26 NOTE — PROGRESS NOTES
12/26/22 0842   Appointment Info   Signing Clinician's Name / Credentials (OT) Laurie Host, OTR/L   Living Environment   People in Home spouse   Current Living Arrangements house   Transportation Anticipated health plan transportation   Living Environment Comments Pt readmit from LTACH. From home w/ spouse at baseline.   Self-Care   Usual Activity Tolerance moderate   Current Activity Tolerance poor   Equipment Currently Used at Home none   Fall history within last six months no   Activity/Exercise/Self-Care Comment Pt readmit from LTACH. Independent w/ all ADLs/IADLs at baseline.   General Information   Onset of Illness/Injury or Date of Surgery 12/16/22   Referring Physician Kb Ling MD   Patient/Family Therapy Goal Statement (OT) None stated   Additional Occupational Profile Info/Pertinent History of Current Problem Pneumothorax on left,   Blanco Osborne is a 57 year old male with paroxysmal A. fib, Liver transplant (2016), CHRISTIAN on BiPAP, h/o CVA and hypertension. He is s/p Trach/PEG following acute resp failure and PEA due to Strep/para influenza pna. He had septic shock with MSOF and is currently on iHD. He had Rt sided Chest tube placed for hydroptx on 12/12/22. He was transferred to [place] on 12/14/22 for CIP. Overnight noted to have bloody stool and today in AM had left Ptx which required left sided chest tube   Existing Precautions/Restrictions fall;oxygen therapy device and L/min;NPO   Cognitive Status Examination   Orientation Status person   Sensory   Sensory Quick Adds sensation intact   Pain Assessment   Patient Currently in Pain No   Range of Motion Comprehensive   General Range of Motion upper extremity range of motion deficits identified   General Upper Extremity Assessment (Range of Motion)   Upper Extremity: Range of Motion shoulder, left: UE ROM;shoulder, right: UE ROM;scapula, left: UE ROM;scapula, right: UE ROM;elbow/forearm, left: UE ROM;elbow/forearm, right: UE ROM;wrist, left:  UE ROM;wrist, right: UE ROM   Strength Comprehensive (MMT)   Comment, General Manual Muscle Testing (MMT) Assessment BUE severely deconditioned. Minimal strength. LUE weaker than RUE   Coordination   Upper Extremity Coordination Left UE impaired;Right UE impaired   Functional Limitations Decreased speed;Fine motor ADL performance impaired;Impaired ability to perform bilateral tasks;Object transport impaired;Reach to targets impaired   Bed Mobility   Bed Mobility supine-sit   Rolling Left Clarion (Bed Mobility) dependent (less than 25% patient effort)   Transfer Skill: Bed to Chair/Chair to Bed   Bed-Chair Clar[place] (Transfers) dependent (less than 25% patient effort)   Lower Body Dressing Assessment/Training   Clarion Level (Lower Body Dressing) dependent (less than 25% patient effort)   Toileting   Clarion Level (Toileting) dependent (less than 25% patient effort)   Clinical Impression   Criteria for Skilled Therapeutic Interventions Met (OT) Yes, treatment indicated   OT Diagnosis Decreased ADL independence   OT Problem List-Impairments impacting ADL problems related to;activity tolerance impaired;mobility;balance;coordination;range of motion (ROM);strength;postural control   Assessment of Occupational Performance 5 or more Performance Deficits   Identified Performance Deficits dressing, toileting, bathing, IADLs, functional mobility   Planned Therapy Interventions (OT) ADL retraining;balance training;strengthening;ROM;transfer training;progressive activity/exercise;neuromuscular re-education;fine motor coordination training   Clinical Decision Making Complexity (OT) high complexity   Anticipated Equipment Needs Upon Discharge (OT)   (TBD)   Risk & Benefits of therapy have been explained patient   OT Total Evaluation Time   OT Eval, High Complexity Minutes (04315) 8   OT Goals   Therapy Frequency (OT) 3 times/wk   OT: Hygiene/Grooming moderate assist;using adaptive equipment   OT: Transfer  Maximum assist;with assistive device   OT: Goal 1 Pt will participate in 10-12 mins of UE ex to increase ROM, strength, coordination in prep for ADLs   Therapeutic Activities   Therapeutic Activity Minutes (20290) 25   Symptoms noted during/after treatment fatigue;increased pain  (in trach area--RN addressed)   Treatment Detail/Skilled Intervention Eval completed, tx indicated. Increased time for room set up, managing lines/tubes, and monitoring vital signs for safety. RN present to assist w/ trach management. Pt needing VCs for technique and safety throughout entirety of session. Increased time as well for pt to communicate needs d/t trach support. Pt rolled to right and left sides to position sling w/ mod A x2 d/t weakess and deconditioned state. Once in chair via ibeth sling, pt completed x5 reps of shoulder shrugs, protraction/retraction w/ mod A d/t weakness. Pt demo's gross grasp x5 but sigificantly deconditioned. Positioned in chair w/ pillow for joint protection for UEs and for comfort. BP and O2 stable w/ activity via trach.  at rest, 118 up to chair. Left w/ all needs, under ICU supervision at end of session.   OT Discharge Planning   OT Plan simple G/H w/ build up handles? UE ROM ex, bilateral hand coordination/strengthening ex   OT Discharge Recommendation (DC Rec) LTACH-pending meets medical criteria   OT Rationale for DC Rec Pt significantly below baseline fro ADL performance. Limited by O2 needs, significant deconditioning, weakness from prolonged LTACH, ICU stay. Recommend further therapies at LTACH to increase independence, strength, activity ashley   OT Brief overview of current status dep for ADLs, lift to chair   Total Session Time   Timed Code Treatment Minutes 25   Total Session Time (sum of timed and untimed services) 33

## 2022-12-27 ENCOUNTER — APPOINTMENT (OUTPATIENT)
Dept: GENERAL RADIOLOGY | Facility: CLINIC | Age: 57
DRG: 207 | End: 2022-12-27
Attending: INTERNAL MEDICINE
Payer: COMMERCIAL

## 2022-12-27 PROBLEM — I95.9 HYPOTENSION, UNSPECIFIED HYPOTENSION TYPE: Status: ACTIVE | Noted: 2022-12-27

## 2022-12-27 LAB
ALBUMIN SERPL-MCNC: 2.1 G/DL (ref 3.4–5)
ANION GAP SERPL CALCULATED.3IONS-SCNC: 8 MMOL/L (ref 3–14)
BUN SERPL-MCNC: 52 MG/DL (ref 7–30)
CALCIUM SERPL-MCNC: 8.1 MG/DL (ref 8.5–10.1)
CHLORIDE BLD-SCNC: 102 MMOL/L (ref 94–109)
CO2 SERPL-SCNC: 29 MMOL/L (ref 20–32)
CREAT SERPL-MCNC: 2.61 MG/DL (ref 0.66–1.25)
ERYTHROCYTE [DISTWIDTH] IN BLOOD BY AUTOMATED COUNT: 18.7 % (ref 10–15)
GFR SERPL CREATININE-BSD FRML MDRD: 28 ML/MIN/1.73M2
GLUCOSE BLD-MCNC: 127 MG/DL (ref 70–99)
GLUCOSE BLDC GLUCOMTR-MCNC: 126 MG/DL (ref 70–99)
GLUCOSE BLDC GLUCOMTR-MCNC: 130 MG/DL (ref 70–99)
GLUCOSE BLDC GLUCOMTR-MCNC: 141 MG/DL (ref 70–99)
GLUCOSE BLDC GLUCOMTR-MCNC: 176 MG/DL (ref 70–99)
GLUCOSE BLDC GLUCOMTR-MCNC: 189 MG/DL (ref 70–99)
GLUCOSE BLDC GLUCOMTR-MCNC: 193 MG/DL (ref 70–99)
HCT VFR BLD AUTO: 29.4 % (ref 40–53)
HGB BLD-MCNC: 8.8 G/DL (ref 13.3–17.7)
MCH RBC QN AUTO: 31.9 PG (ref 26.5–33)
MCHC RBC AUTO-ENTMCNC: 29.9 G/DL (ref 31.5–36.5)
MCV RBC AUTO: 107 FL (ref 78–100)
PHOSPHATE SERPL-MCNC: 2.4 MG/DL (ref 2.5–4.5)
PLATELET # BLD AUTO: 63 10E3/UL (ref 150–450)
POTASSIUM BLD-SCNC: 3.1 MMOL/L (ref 3.4–5.3)
POTASSIUM BLD-SCNC: 3.8 MMOL/L (ref 3.4–5.3)
RBC # BLD AUTO: 2.76 10E6/UL (ref 4.4–5.9)
SODIUM SERPL-SCNC: 139 MMOL/L (ref 133–144)
WBC # BLD AUTO: 7.5 10E3/UL (ref 4–11)

## 2022-12-27 PROCEDURE — 250N000013 HC RX MED GY IP 250 OP 250 PS 637: Performed by: PHYSICIAN ASSISTANT

## 2022-12-27 PROCEDURE — 84132 ASSAY OF SERUM POTASSIUM: CPT | Performed by: INTERNAL MEDICINE

## 2022-12-27 PROCEDURE — 80069 RENAL FUNCTION PANEL: CPT | Performed by: STUDENT IN AN ORGANIZED HEALTH CARE EDUCATION/TRAINING PROGRAM

## 2022-12-27 PROCEDURE — 250N000011 HC RX IP 250 OP 636: Performed by: INTERNAL MEDICINE

## 2022-12-27 PROCEDURE — 250N000013 HC RX MED GY IP 250 OP 250 PS 637: Performed by: INTERNAL MEDICINE

## 2022-12-27 PROCEDURE — 258N000003 HC RX IP 258 OP 636: Performed by: SPECIALIST

## 2022-12-27 PROCEDURE — 250N000009 HC RX 250: Performed by: INTERNAL MEDICINE

## 2022-12-27 PROCEDURE — 250N000013 HC RX MED GY IP 250 OP 250 PS 637: Performed by: SURGERY

## 2022-12-27 PROCEDURE — 94003 VENT MGMT INPAT SUBQ DAY: CPT

## 2022-12-27 PROCEDURE — 999N000157 HC STATISTIC RCP TIME EA 10 MIN

## 2022-12-27 PROCEDURE — 94640 AIRWAY INHALATION TREATMENT: CPT | Mod: 76

## 2022-12-27 PROCEDURE — 250N000012 HC RX MED GY IP 250 OP 636 PS 637: Performed by: INTERNAL MEDICINE

## 2022-12-27 PROCEDURE — 200N000001 HC R&B ICU

## 2022-12-27 PROCEDURE — 999N000009 HC STATISTIC AIRWAY CARE

## 2022-12-27 PROCEDURE — 99291 CRITICAL CARE FIRST HOUR: CPT | Performed by: INTERNAL MEDICINE

## 2022-12-27 PROCEDURE — 99233 SBSQ HOSP IP/OBS HIGH 50: CPT | Performed by: INTERNAL MEDICINE

## 2022-12-27 PROCEDURE — 71045 X-RAY EXAM CHEST 1 VIEW: CPT

## 2022-12-27 PROCEDURE — 250N000011 HC RX IP 250 OP 636: Performed by: SPECIALIST

## 2022-12-27 PROCEDURE — 85027 COMPLETE CBC AUTOMATED: CPT | Performed by: INTERNAL MEDICINE

## 2022-12-27 PROCEDURE — 94640 AIRWAY INHALATION TREATMENT: CPT

## 2022-12-27 PROCEDURE — 258N000001 HC RX 258: Performed by: INTERNAL MEDICINE

## 2022-12-27 RX ORDER — SODIUM CHLORIDE FOR INHALATION 3 %
4 VIAL, NEBULIZER (ML) INHALATION EVERY 6 HOURS
Status: DISCONTINUED | OUTPATIENT
Start: 2022-12-27 | End: 2022-12-27 | Stop reason: DRUGHIGH

## 2022-12-27 RX ORDER — IPRATROPIUM BROMIDE AND ALBUTEROL SULFATE 2.5; .5 MG/3ML; MG/3ML
3 SOLUTION RESPIRATORY (INHALATION)
Status: DISCONTINUED | OUTPATIENT
Start: 2022-12-27 | End: 2022-12-29 | Stop reason: HOSPADM

## 2022-12-27 RX ORDER — AMPICILLIN AND SULBACTAM 2; 1 G/1; G/1
3 INJECTION, POWDER, FOR SOLUTION INTRAMUSCULAR; INTRAVENOUS EVERY 6 HOURS
Status: DISCONTINUED | OUTPATIENT
Start: 2022-12-27 | End: 2022-12-28

## 2022-12-27 RX ORDER — AMPICILLIN AND SULBACTAM 2; 1 G/1; G/1
3 INJECTION, POWDER, FOR SOLUTION INTRAMUSCULAR; INTRAVENOUS EVERY 6 HOURS
Status: CANCELLED | OUTPATIENT
Start: 2022-12-27

## 2022-12-27 RX ORDER — B COMPLEX C NO.10/FOLIC ACID 900MCG/5ML
5 LIQUID (ML) ORAL DAILY
Status: CANCELLED | OUTPATIENT
Start: 2022-12-28

## 2022-12-27 RX ORDER — POTASSIUM CHLORIDE 1.5 G/1.58G
40 POWDER, FOR SOLUTION ORAL ONCE
Status: COMPLETED | OUTPATIENT
Start: 2022-12-27 | End: 2022-12-27

## 2022-12-27 RX ORDER — CHLORHEXIDINE GLUCONATE ORAL RINSE 1.2 MG/ML
15 SOLUTION DENTAL EVERY 12 HOURS
Status: CANCELLED | OUTPATIENT
Start: 2022-12-27

## 2022-12-27 RX ORDER — PREDNISONE 2.5 MG/1
2.5 TABLET ORAL DAILY
Status: DISCONTINUED | OUTPATIENT
Start: 2022-12-30 | End: 2022-12-29 | Stop reason: HOSPADM

## 2022-12-27 RX ORDER — SODIUM CHLORIDE FOR INHALATION 7 %
2 VIAL, NEBULIZER (ML) INHALATION EVERY 6 HOURS
Status: DISCONTINUED | OUTPATIENT
Start: 2022-12-27 | End: 2022-12-27

## 2022-12-27 RX ORDER — DEXTROSE MONOHYDRATE 100 MG/ML
INJECTION, SOLUTION INTRAVENOUS CONTINUOUS PRN
Status: CANCELLED | OUTPATIENT
Start: 2022-12-27

## 2022-12-27 RX ORDER — LACOSAMIDE 10 MG/ML
100 SOLUTION ORAL 2 TIMES DAILY
Status: CANCELLED | OUTPATIENT
Start: 2022-12-27

## 2022-12-27 RX ORDER — SODIUM CHLORIDE FOR INHALATION 7 %
2 VIAL, NEBULIZER (ML) INHALATION EVERY 6 HOURS
Status: COMPLETED | OUTPATIENT
Start: 2022-12-27 | End: 2022-12-28

## 2022-12-27 RX ORDER — DEXTROSE MONOHYDRATE 25 G/50ML
25-50 INJECTION, SOLUTION INTRAVENOUS
Status: CANCELLED | OUTPATIENT
Start: 2022-12-27

## 2022-12-27 RX ORDER — NICOTINE POLACRILEX 4 MG
15-30 LOZENGE BUCCAL
Status: CANCELLED | OUTPATIENT
Start: 2022-12-27

## 2022-12-27 RX ORDER — MIDODRINE HYDROCHLORIDE 5 MG/1
10 TABLET ORAL
Status: CANCELLED | OUTPATIENT
Start: 2022-12-28

## 2022-12-27 RX ORDER — NYSTATIN 100000/ML
500000 SUSPENSION, ORAL (FINAL DOSE FORM) ORAL 4 TIMES DAILY
Status: CANCELLED | OUTPATIENT
Start: 2022-12-27

## 2022-12-27 RX ORDER — IPRATROPIUM BROMIDE AND ALBUTEROL SULFATE 2.5; .5 MG/3ML; MG/3ML
3 SOLUTION RESPIRATORY (INHALATION)
Status: CANCELLED | OUTPATIENT
Start: 2022-12-27

## 2022-12-27 RX ORDER — LACTULOSE 10 G/15ML
20 SOLUTION ORAL 3 TIMES DAILY
Status: CANCELLED | OUTPATIENT
Start: 2022-12-27

## 2022-12-27 RX ORDER — LEVETIRACETAM 100 MG/ML
1000 SOLUTION ORAL EVERY 24 HOURS
Status: CANCELLED | OUTPATIENT
Start: 2022-12-28

## 2022-12-27 RX ORDER — CARBOXYMETHYLCELLULOSE SODIUM 5 MG/ML
1 SOLUTION/ DROPS OPHTHALMIC 3 TIMES DAILY
Status: CANCELLED | OUTPATIENT
Start: 2022-12-27

## 2022-12-27 RX ORDER — PREDNISONE 5 MG/1
5 TABLET ORAL DAILY
Status: CANCELLED | OUTPATIENT
Start: 2022-12-28 | End: 2022-12-30

## 2022-12-27 RX ORDER — LANOLIN ALCOHOL/MO/W.PET/CERES
6 CREAM (GRAM) TOPICAL
Status: CANCELLED | OUTPATIENT
Start: 2022-12-27

## 2022-12-27 RX ORDER — PREDNISONE 2.5 MG/1
2.5 TABLET ORAL DAILY
Status: CANCELLED | OUTPATIENT
Start: 2022-12-30 | End: 2023-01-01

## 2022-12-27 RX ORDER — GUAIFENESIN 600 MG/1
15 TABLET, EXTENDED RELEASE ORAL DAILY
Status: CANCELLED | OUTPATIENT
Start: 2022-12-28

## 2022-12-27 RX ORDER — ONDANSETRON 4 MG/1
4 TABLET, ORALLY DISINTEGRATING ORAL EVERY 6 HOURS PRN
Status: CANCELLED | OUTPATIENT
Start: 2022-12-27

## 2022-12-27 RX ORDER — PREDNISONE 5 MG/1
5 TABLET ORAL DAILY
Status: DISCONTINUED | OUTPATIENT
Start: 2022-12-28 | End: 2022-12-29

## 2022-12-27 RX ORDER — FOLIC ACID 1 MG/1
1 TABLET ORAL DAILY
Status: CANCELLED | OUTPATIENT
Start: 2022-12-28

## 2022-12-27 RX ORDER — QUETIAPINE FUMARATE 50 MG/1
50 TABLET, FILM COATED ORAL AT BEDTIME
Status: CANCELLED | OUTPATIENT
Start: 2022-12-27

## 2022-12-27 RX ADMIN — MICAFUNGIN SODIUM 100 MG: 50 INJECTION, POWDER, LYOPHILIZED, FOR SOLUTION INTRAVENOUS at 14:56

## 2022-12-27 RX ADMIN — RIFAXIMIN 550 MG: 550 TABLET ORAL at 08:37

## 2022-12-27 RX ADMIN — INSULIN ASPART 1 UNITS: 100 INJECTION, SOLUTION INTRAVENOUS; SUBCUTANEOUS at 08:47

## 2022-12-27 RX ADMIN — CHLORHEXIDINE GLUCONATE 0.12% ORAL RINSE 15 ML: 1.2 LIQUID ORAL at 20:35

## 2022-12-27 RX ADMIN — TACROLIMUS 1 MG: 5 CAPSULE ORAL at 21:23

## 2022-12-27 RX ADMIN — FOLIC ACID 1 MG: 1 TABLET ORAL at 08:37

## 2022-12-27 RX ADMIN — LACOSAMIDE 100 MG: 10 SOLUTION ORAL at 08:51

## 2022-12-27 RX ADMIN — Medication 1 DROP: at 16:53

## 2022-12-27 RX ADMIN — NYSTATIN 500000 UNITS: 100000 SUSPENSION ORAL at 08:55

## 2022-12-27 RX ADMIN — MIDODRINE HYDROCHLORIDE 10 MG: 5 TABLET ORAL at 18:38

## 2022-12-27 RX ADMIN — DEXTROSE MONOHYDRATE 1000 ML: 100 INJECTION, SOLUTION INTRAVENOUS at 23:57

## 2022-12-27 RX ADMIN — OXYCODONE HYDROCHLORIDE 2.5 MG: 5 TABLET ORAL at 15:25

## 2022-12-27 RX ADMIN — LEVETIRACETAM 1000 MG: 100 SOLUTION ORAL at 13:20

## 2022-12-27 RX ADMIN — RIFAXIMIN 550 MG: 550 TABLET ORAL at 20:36

## 2022-12-27 RX ADMIN — APIXABAN 5 MG: 5 TABLET, FILM COATED ORAL at 08:37

## 2022-12-27 RX ADMIN — AMPICILLIN SODIUM AND SULBACTAM SODIUM 3 G: 2; 1 INJECTION, POWDER, FOR SOLUTION INTRAMUSCULAR; INTRAVENOUS at 14:55

## 2022-12-27 RX ADMIN — POTASSIUM & SODIUM PHOSPHATES POWDER PACK 280-160-250 MG 1 PACKET: 280-160-250 PACK at 11:35

## 2022-12-27 RX ADMIN — NYSTATIN 500000 UNITS: 100000 SUSPENSION ORAL at 18:38

## 2022-12-27 RX ADMIN — IPRATROPIUM BROMIDE AND ALBUTEROL SULFATE 3 ML: .5; 3 SOLUTION RESPIRATORY (INHALATION) at 19:06

## 2022-12-27 RX ADMIN — LACTULOSE 20 G: 20 SOLUTION ORAL at 08:42

## 2022-12-27 RX ADMIN — QUETIAPINE FUMARATE 50 MG: 50 TABLET ORAL at 21:23

## 2022-12-27 RX ADMIN — TACROLIMUS 1 MG: 5 CAPSULE ORAL at 08:38

## 2022-12-27 RX ADMIN — MULTIVITAMIN 15 ML: LIQUID ORAL at 08:38

## 2022-12-27 RX ADMIN — Medication 5 ML: at 08:39

## 2022-12-27 RX ADMIN — MELATONIN TAB 3 MG 6 MG: 3 TAB at 20:35

## 2022-12-27 RX ADMIN — CHLORHEXIDINE GLUCONATE 0.12% ORAL RINSE 15 ML: 1.2 LIQUID ORAL at 08:42

## 2022-12-27 RX ADMIN — AMPICILLIN SODIUM AND SULBACTAM SODIUM 3 G: 2; 1 INJECTION, POWDER, FOR SOLUTION INTRAMUSCULAR; INTRAVENOUS at 20:35

## 2022-12-27 RX ADMIN — PREDNISONE 10 MG: 10 TABLET ORAL at 08:37

## 2022-12-27 RX ADMIN — POTASSIUM & SODIUM PHOSPHATES POWDER PACK 280-160-250 MG 1 PACKET: 280-160-250 PACK at 06:23

## 2022-12-27 RX ADMIN — MIDODRINE HYDROCHLORIDE 10 MG: 5 TABLET ORAL at 08:37

## 2022-12-27 RX ADMIN — NYSTATIN 500000 UNITS: 100000 SUSPENSION ORAL at 21:23

## 2022-12-27 RX ADMIN — Medication 40 MG: at 08:38

## 2022-12-27 RX ADMIN — POTASSIUM CHLORIDE 40 MEQ: 1.5 POWDER, FOR SOLUTION ORAL at 06:23

## 2022-12-27 RX ADMIN — IPRATROPIUM BROMIDE AND ALBUTEROL SULFATE 3 ML: .5; 3 SOLUTION RESPIRATORY (INHALATION) at 14:56

## 2022-12-27 RX ADMIN — Medication 40 MG: at 20:36

## 2022-12-27 RX ADMIN — IPRATROPIUM BROMIDE AND ALBUTEROL SULFATE 3 ML: .5; 3 SOLUTION RESPIRATORY (INHALATION) at 08:01

## 2022-12-27 RX ADMIN — POTASSIUM & SODIUM PHOSPHATES POWDER PACK 280-160-250 MG 1 PACKET: 280-160-250 PACK at 14:56

## 2022-12-27 RX ADMIN — INSULIN ASPART 1 UNITS: 100 INJECTION, SOLUTION INTRAVENOUS; SUBCUTANEOUS at 16:55

## 2022-12-27 RX ADMIN — INSULIN ASPART 1 UNITS: 100 INJECTION, SOLUTION INTRAVENOUS; SUBCUTANEOUS at 00:44

## 2022-12-27 RX ADMIN — MIDODRINE HYDROCHLORIDE 10 MG: 5 TABLET ORAL at 11:35

## 2022-12-27 RX ADMIN — LACOSAMIDE 100 MG: 10 SOLUTION ORAL at 21:23

## 2022-12-27 RX ADMIN — NYSTATIN 500000 UNITS: 100000 SUSPENSION ORAL at 12:17

## 2022-12-27 RX ADMIN — Medication 1 DROP: at 21:23

## 2022-12-27 RX ADMIN — APIXABAN 5 MG: 5 TABLET, FILM COATED ORAL at 20:36

## 2022-12-27 RX ADMIN — IPRATROPIUM BROMIDE AND ALBUTEROL SULFATE 3 ML: .5; 3 SOLUTION RESPIRATORY (INHALATION) at 11:41

## 2022-12-27 RX ADMIN — INSULIN GLARGINE 20 UNITS: 100 INJECTION, SOLUTION SUBCUTANEOUS at 08:48

## 2022-12-27 RX ADMIN — INSULIN ASPART 2 UNITS: 100 INJECTION, SOLUTION INTRAVENOUS; SUBCUTANEOUS at 12:10

## 2022-12-27 RX ADMIN — Medication 1 DROP: at 08:37

## 2022-12-27 ASSESSMENT — ACTIVITIES OF DAILY LIVING (ADL)
ADLS_ACUITY_SCORE: 46
ADLS_ACUITY_SCORE: 51
ADLS_ACUITY_SCORE: 46
ADLS_ACUITY_SCORE: 51
ADLS_ACUITY_SCORE: 46
ADLS_ACUITY_SCORE: 51
ADLS_ACUITY_SCORE: 46
ADLS_ACUITY_SCORE: 46
ADLS_ACUITY_SCORE: 51

## 2022-12-27 NOTE — PROGRESS NOTES
CLINICAL NUTRITION SERVICES - REASSESSMENT NOTE      Recommendations Ordered by Registered Dietitian (RD):   Increase TF Novasource Renal now to 50 mL/hr and after 12 hrs increase to goal 60 mL/hr = 2880 kcal (33 kcal/kg), 131 g protein (1.5 gm/kg), 264 g CHO, 0 g fiber, 1032 mL H2O     Malnutrition: 12/19  % Weight Loss:  > 5% in 1 month (severe malnutrition)  % Intake:  </= 50% for >/= 5 days (severe malnutrition)(will meet 12/20)  Subcutaneous Fat Loss:  Orbital region severe depletion, Upper arm region severe depletion and Thoracic region severe depletion  Muscle Loss:  Temporal region severe depletion, Clavicle bone region severe depletion, Patellar region severe depletion, Anterior thigh region severe depletion and Posterior calf region severe depletion  Fluid Retention:  Mild as above      Malnutrition Diagnosis: Severe malnutrition  In Context of:  Acute illness or injury       EVALUATION OF PROGRESS TOWARD GOALS   Diet:  NPO with Trach     Nutrition Support: TF was started 12/21 at 10 mL/hr and increased by 15 mL every 8 hrs to preliminary goal (achieved 12/22 at 1000) as below:    Nutrition Support Enteral:  Type of Feeding Tube: PEG  Enteral Frequency:  Continuous  Enteral Regimen: Novasource Renal at 40 mL/hr  Total Enteral Provisions: 1920 kcal (22 kcal/kg), 87 g protein (1 g/kg), 176 g CHO, 0 g fiber, 688 mL H2O  Free Water Flush: 30 mL every 4 hrs  Nephronex daily via FT    Intake/Tolerance:    Stool Pattern (Pt continues to receive Lactulose):  12/21:  100 mL  12/22:  x5 (350 mL)  12/23:  200 mL   12/24:  1950 mL  12/25:  350 mL  12/26:  650 mL  12/27:  10 mL thus far  K 3.1 (L), Phos 2.4 (L), BUN 52 (H), Cr 2.6 (H) - Pt with LORETTA, on IHD  BGM: 155, 134, 226, 141, 126, 176 - Medium Resistant sliding scale insulin + Lantus 20 Units/day.  I/O: 1330/2300. Wt: 94.8 kg (up 6.8 kg since admit). Pt with 1+ trace generalized and 2+ mild BLE edema.    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    Discharge planning to [place] underway (awaiting authorization).    Previous Goals (12/21):   Patient will meet % needs within the next 5-7 days   Evaluation: Met    Previous Nutrition Diagnosis (12/21):   Inadequate protein-energy intake related to NPO with plans to resume TF today as evidenced by meeting 0% needs   Evaluation: Improving    CURRENT NUTRITION DIAGNOSIS  Inadequate enteral nutrition infusion related to low TF rate as evidenced by TF meeting approximately 2/3 estimated needs and ready to advance to final needs.    INTERVENTIONS  Recommendations / Nutrition Prescription  Increase TF Novasource Renal now to 50 mL/hr and after 12 hrs increase to goal 60 mL/hr = 2880 kcal (33 kcal/kg), 131 g protein (1.5 gm/kg), 264 g CHO, 0 g fiber, 1032 mL H2O      Implementation  Collaboration and Referral of Nutrition care - Pt was discussed during ICU interdisciplinary rounds this morning.  EN Schedule - Ordered TF rate increase in Epic as above.    Goals  TF goal Novasource Renal at 60 mL/hr will meet % final needs.    MONITORING AND EVALUATION:  Progress towards goals will be monitored and evaluated per protocol and Practice Guidelines    Jigna Cruz, RD, LD, CNSC

## 2022-12-27 NOTE — PROGRESS NOTES
Assumed care for 4 hours. Pt resting comfortably at this time. Alert. Follows commands. Trach and struggles to make needs known. VSS. No acute events. See Flowsheet for further information.

## 2022-12-27 NOTE — PROGRESS NOTES
Care Management Follow Up    Length of Stay (days): 11    Expected Discharge Date:       Concerns to be Addressed:       Patient plan of care discussed at interdisciplinary rounds: Yes    Anticipated Discharge Disposition: Outside Hospital     Anticipated Discharge Services:    Anticipated Discharge DME:      Patient/family educated on Medicare website which has current facility and service quality ratings:    Education Provided on the Discharge Plan:    Patient/Family in Agreement with the Plan: yes    Referrals Placed by CM/SW: Post Acute Facilities  Private pay costs discussed: insurance costs TBD    Additional Information:  Following for discharge to [facility]    CC got return call from Ana Maria Morocho.  They have no dialysis ready beds.  They could not accept patient.     Wife updated and okay with return to [facility].    CC spoke with Teressa Auguste and she will pursue authorization from [facility].  Will update CC if peer-peer is needed.     CC to follow for transfer back to [facility].  Will need ride arranged.     Addendum 13:30: Update from Laura at [facility] that authorization was received.  Pt can come tomorrow anytime after noon.   Wife/Bedside RN/MD updated    [employer] Ride set up for 1400 (closest available to desired time) with anisa dome/stretcher.  PCS on chart  Addendum 1600: Called [employer] Transport and changed to [employer], settings given.     [facility] notified of time.     CC on 12/28 can assist with any other transfer needs.       Alethea Mclean, RN

## 2022-12-27 NOTE — PROGRESS NOTES
Assessment and Plan:   LORETTA: post-cardiac arrest. Has been on  CRRT, now on IHD with MWF schedule. Had HD yest: 3h, 3K , 2L UF, R CVC with 350 BFR.    HD in am. Orders placed for tomorrow.             Interval History:   Cirrhosis: CARRILLO, S/P liver transplant.   Afib  CHRISTIAN  HTf  Anemia: has had transfusions.   S/P trach, PEG. Adm 12/16/22 with lower GI bleed and L hemothorax.                  Review of Systems:   Trached.           Medications:       - MEDICATION INSTRUCTIONS for Dialysis Patients -   Does not apply See Admin Instructions     ampicillin-sulbactam  3 g Intravenous Q6H     apixaban ANTICOAGULANT  5 mg Oral or Feeding Tube BID     B and C vitamin Complex with folic acid  5 mL Per Feeding Tube Daily     carboxymethylcellulose PF  1 drop Both Eyes TID     chlorhexidine  15 mL Mouth/Throat Q12H     epoetin mirta-epbx  10,000 Units Subcutaneous Once per day on Mon Wed Fri     folic acid  1 mg Oral or Feeding Tube Daily     insulin aspart  1-6 Units Subcutaneous Q4H     insulin glargine  20 Units Subcutaneous Daily     ipratropium - albuterol 0.5 mg/2.5 mg/3 mL  3 mL Nebulization 4x daily     lacosamide  100 mg Oral or Feeding Tube BID     lactulose  20 g Oral or Feeding Tube TID     levETIRAcetam  1,000 mg Oral or Feeding Tube Q24H     micafungin  100 mg Intravenous Q24H     midodrine  10 mg Oral or Feeding Tube TID w/meals     multivitamins w/minerals  15 mL Per Feeding Tube Daily     nystatin  500,000 Units Swish & Swallow 4x Daily     pantoprazole  40 mg Per Feeding Tube Q12H     potassium & sodium phosphates  1 packet Oral or Feeding Tube Q4H     predniSONE  10 mg Per NG tube Daily     QUEtiapine  50 mg Oral or Feeding Tube At Bedtime     rifaximin  550 mg Per Feeding Tube BID     tacrolimus  1 mg Oral or Feeding Tube BID       dextrose       Current active medications and PTA medications reviewed, see medication list for details.            Physical Exam:   Vitals were reviewed  Patient Vitals  for the past 24 hrs:   BP Temp Temp src Pulse Resp SpO2 Weight   12/27/22 1200 119/84 98.2  F (36.8  C) Oral 109 24 98 % --   12/27/22 1141 -- -- -- 110 21 97 % --   12/27/22 1100 116/78 -- -- 101 18 100 % --   12/27/22 1000 95/66 -- -- 100 17 100 % --   12/27/22 0900 128/89 -- -- 114 21 100 % --   12/27/22 0801 109/78 -- -- 101 18 98 % --   12/27/22 0800 109/78 99.8  F (37.7  C) Oral 101 18 98 % --   12/27/22 0700 (!) 142/88 -- -- 115 28 98 % --   12/27/22 0600 97/63 98.8  F (37.1  C) -- 105 19 99 % --   12/27/22 0500 102/70 -- -- 102 19 99 % --   12/27/22 0446 -- -- -- 102 -- 98 % --   12/27/22 0400 117/79 -- -- 109 18 99 % --   12/27/22 0300 114/81 -- -- 110 23 100 % --   12/27/22 0200 95/65 -- -- 97 17 100 % --   12/27/22 0100 100/69 -- -- 103 18 98 % --   12/27/22 0000 112/76 98.8  F (37.1  C) Oral 114 23 98 % 94.8 kg (208 lb 15.9 oz)   12/26/22 2339 -- -- -- 105 -- 99 % --   12/26/22 2300 95/61 -- -- 97 14 99 % --   12/26/22 2200 103/66 -- -- 101 15 99 % --   12/26/22 2100 114/71 -- -- 104 17 100 % --   12/26/22 2000 127/78 98.7  F (37.1  C) Oral 107 17 99 % --   12/26/22 1900 118/77 -- -- 111 24 99 % --   12/26/22 1800 105/76 -- -- 112 24 99 % --   12/26/22 1725 113/77 98.7  F (37.1  C) Axillary 111 -- 100 % --   12/26/22 1700 122/85 -- -- 120 22 100 % --   12/26/22 1645 125/86 -- -- 120 23 100 % --   12/26/22 1630 114/82 -- -- (!) 122 24 (!) 80 % --   12/26/22 1615 113/80 -- -- 112 20 99 % --   12/26/22 1600 118/85 98.7  F (37.1  C) Axillary 112 21 94 % --   12/26/22 1545 107/75 -- -- 109 22 100 % --   12/26/22 1542 107/75 98.5  F (36.9  C) Axillary 108 18 99 % --   12/26/22 1520 123/80 -- -- 109 20 100 % --   12/26/22 1500 117/79 -- -- 107 18 100 % --   12/26/22 1445 106/74 -- -- 105 17 100 % --   12/26/22 1430 123/85 -- -- 112 22 100 % --       Temp:  [98.2  F (36.8  C)-99.8  F (37.7  C)] 98.2  F (36.8  C)  Pulse:  [] 109  Resp:  [14-28] 24  BP: ()/(61-89) 119/84  FiO2 (%):  [25 %-40 %] 30  %  SpO2:  [80 %-100 %] 98 %    Temperatures:  Current - Temp: 98.2  F (36.8  C); Max - Temp  Av.8  F (37.1  C)  Min: 98.2  F (36.8  C)  Max: 99.8  F (37.7  C)  Respiration range: Resp  Av  Min: 14  Max: 28  Pulse range: Pulse  Av.9  Min: 97  Max: 122  Blood pressure range: Systolic (24hrs), Av , Min:95 , Max:142   ; Diastolic (24hrs), Av, Min:61, Max:89    Pulse oximetry range: SpO2  Av.5 %  Min: 80 %  Max: 100 %    I/O last 3 completed shifts:  In: 1330 [I.V.:100; NG/GT:590]  Out: 2300 [Other:2000; Stool:300]      Intake/Output Summary (Last 24 hours) at 2022 1424  Last data filed at 2022 1200  Gross per 24 hour   Intake 930 ml   Output 2160 ml   Net -1230 ml       Alert, trached, on trach dome  R CVC with no tenderness or redness  LE 1+ edema, good DP pulses  Lungs with coarse ant BS  Cor RRR nl S1 S2 no M       Wt Readings from Last 4 Encounters:   22 94.8 kg (208 lb 15.9 oz)   22 100.2 kg (221 lb)   22 100.2 kg (221 lb)   12/15/22 87 kg (191 lb 12.8 oz)          Data:          Lab Results   Component Value Date     2022     2022     2022     2020     10/07/2019     2019    Lab Results   Component Value Date    CHLORIDE 102 2022    CHLORIDE 101 2022    CHLORIDE 102 2022    CHLORIDE 105 2020    CHLORIDE 106 2020    CHLORIDE 99 10/07/2019    CHLORIDE 108 2019    CHLORIDE 106 2019    Lab Results   Component Value Date    BUN 52 2022    BUN 62 2022    BUN 37 2022    BUN 23 2020    BUN 18 10/07/2019    BUN 20 2019      Lab Results   Component Value Date    POTASSIUM 3.8 2022    POTASSIUM 3.1 2022    POTASSIUM 3.7 2022    POTASSIUM 3.9 2020    POTASSIUM 4.3 10/07/2019    POTASSIUM 4.2 2019    Lab Results   Component Value Date    CO2 29 2022    CO2 26 2022    CO2 25 2022    CO2  28 04/20/2020    CO2 26 10/07/2019    CO2 24 02/20/2019    Lab Results   Component Value Date    CR 2.61 12/27/2022    CR 3.08 12/26/2022    CR 2.25 12/24/2022    CR 1.06 04/20/2020    CR 1.01 10/07/2019    CR 1.08 02/20/2019        Recent Labs   Lab Test 12/27/22  0420 12/26/22  0436 12/24/22  1808   WBC 7.5 4.8 5.8   HGB 8.8* 8.1* 8.4*   HCT 29.4* 26.1* 28.4*   * 103* 106*   PLT 63* 55* 58*     Recent Labs   Lab Test 12/23/22  0401 12/22/22  1108 12/21/22  0405 12/20/22  1227 12/20/22  0426 12/18/22  0013 12/16/22  2239   AST 14 16 20  --    < >  --  21   ALT 17 17 17  --    < >  --  20   ALKPHOS 198* 163* 133  --    < >  --  251*   BILITOTAL 0.6 0.8 1.1  --    < >  --  0.9   CHANDLER  --   --   --  32  --  38 113*    < > = values in this interval not displayed.       Recent Labs   Lab Test 12/26/22  0435 12/24/22  0551 12/23/22  0401   MAG 2.0 1.9 1.8     Recent Labs   Lab Test 12/27/22  0420 12/26/22  0435 12/25/22  0525   PHOS 2.4* 3.1 2.9     Recent Labs   Lab Test 12/27/22  0420 12/26/22  0435 12/24/22  0551   KELLY 8.1* 8.2* 8.7       Lab Results   Component Value Date    KELLY 8.1 (L) 12/27/2022     Lab Results   Component Value Date    WBC 7.5 12/27/2022    HGB 8.8 (L) 12/27/2022    HCT 29.4 (L) 12/27/2022     (H) 12/27/2022    PLT 63 (L) 12/27/2022     Lab Results   Component Value Date     12/27/2022    POTASSIUM 3.8 12/27/2022    CHLORIDE 102 12/27/2022    CO2 29 12/27/2022     (H) 12/27/2022     Lab Results   Component Value Date    BUN 52 (H) 12/27/2022    CR 2.61 (H) 12/27/2022     Lab Results   Component Value Date    MAG 2.0 12/26/2022     Lab Results   Component Value Date    PHOS 2.4 (L) 12/27/2022       Creatinine   Date Value Ref Range Status   12/27/2022 2.61 (H) 0.66 - 1.25 mg/dL Final   12/26/2022 3.08 (H) 0.66 - 1.25 mg/dL Final   12/24/2022 2.25 (H) 0.66 - 1.25 mg/dL Final   12/23/2022 2.83 (H) 0.66 - 1.25 mg/dL Final   12/22/2022 2.45 (H) 0.66 - 1.25 mg/dL Final    12/21/2022 2.66 (H) 0.66 - 1.25 mg/dL Final   04/20/2020 1.06 0.66 - 1.25 mg/dL Final   10/07/2019 1.01 0.66 - 1.25 mg/dL Final   02/20/2019 1.08 0.66 - 1.25 mg/dL Final   07/10/2018 1.32 (H) 0.66 - 1.25 mg/dL Final   10/31/2017 1.18 0.66 - 1.25 mg/dL Final   10/17/2017 1.26 (H) 0.66 - 1.25 mg/dL Final       Attestation:  I have reviewed today's vital signs, notes, medications, labs and imaging.     Elvis Mariano MD

## 2022-12-27 NOTE — PLAN OF CARE
Goal Outcome Evaluation:      Plan of Care Reviewed With: other (see comments) Patient was discussed during ICU interdisciplinary rounds this morning.    Overall Patient Progress: no change    Outcome Evaluation: TF Novasource Renal currently meeting preliminary needs during the 1st week and now ready to progress to meeting final needs. Will increase TF now to 50 mL/hr and after 12 hrs increase to final goal 60 mL/hr.    Jigna Cruz, RD, LD, CNSC

## 2022-12-27 NOTE — PROGRESS NOTES
Pt his on the vent  PS 5 / 5 and 25 %. We will keep the Pt on the vent overnight and put him back on HTD in the morning.  Rt will continue to follow.  Lisa Craig, RT

## 2022-12-27 NOTE — PROGRESS NOTES
Updated care coordinator that patient is now on full vent support. Care coordinator adjust transport for tomorrow.

## 2022-12-27 NOTE — PROGRESS NOTES
Comprehensive Daily ICU Note        Blanco Osborne MRN# [id number]   Age: 57 year old YOB: 1965     Date of Admission: 12/16/2022    Primary care provider: Ki Powers     CODE STATUS: FULL      Problem List:         Principal Problem:    Pneumothorax on left  Active Problems:    Cirrhosis of liver (H)    Liver transplanted (H)    Acute kidney injury (H)    Embolic stroke (H)    Encephalopathy    Chronic kidney disease, stage V (H)    Cardiac arrest (H)    Seizures (H)    Human herpesvirus 6 viremia             Treatment goals for next 24 hours:   Alternate trach dome and pressure support.  Taper steroids        Subjective/ Last 24 hours:   Did well on trach dome for a few hours but then had acute desaturation associated with a lot of secretions.  improved after being placed back on ventilator and getting suction.         Mechanical Ventilation/Vitalsigns/IsandOs:       Temp:  [98.2  F (36.8  C)-99.8  F (37.7  C)] 98.2  F (36.8  C)  Pulse:  [] 112  Resp:  [14-41] 20  BP: ()/(61-96) 139/96  FiO2 (%):  [25 %-40 %] 35 %  SpO2:  [93 %-100 %] 98 %      Vent Mode: CPAP/PS  (Continuous positive airway pressure with Pressure Support)  FiO2 (%): 35 %  Resp Rate (Set): 16 breaths/min  Tidal Volume (Set, mL): 450 mL  PEEP (cm H2O): 5 cmH2O  Pressure Support (cm H2O): 5 cmH2O  Resp: 20      Intake/Output Summary (Last 24 hours) at 12/27/2022 1635  Last data filed at 12/27/2022 1500  Gross per 24 hour   Intake 1310 ml   Output 2460 ml   Net -1150 ml              Physical Examination:     General: lying in bed , awake. Follows commands and can weakly squeeze hands.  HEENT: Healing tracheostomy site  Lungs: clear anteriorly  CVS: Regular rate rhythm  Abdomen: Clean PEG site, very protuberant  Extremities/musculoskeletal: Cachectic, extensive ecchymoses on skin especially upper chest and arms   Neurology: Nonfocal very weak           Feeding/Glucose:     Orders Placed This Encounter      NPO for  Medical/Clinical Reasons Except for: Ice Chips, Meds        Recent Labs   Lab 12/27/22  1210 12/27/22  0845 12/27/22  0429 12/27/22  0420 12/27/22  0043 12/26/22  2030   * 176* 126* 127* 141* 226*                Medications:       - MEDICATION INSTRUCTIONS for Dialysis Patients -   Does not apply See Admin Instructions     ampicillin-sulbactam  3 g Intravenous Q6H     apixaban ANTICOAGULANT  5 mg Oral or Feeding Tube BID     B and C vitamin Complex with folic acid  5 mL Per Feeding Tube Daily     carboxymethylcellulose PF  1 drop Both Eyes TID     chlorhexidine  15 mL Mouth/Throat Q12H     epoetin mirta-epbx  10,000 Units Subcutaneous Once per day on Mon Wed Fri     folic acid  1 mg Oral or Feeding Tube Daily     insulin aspart  1-6 Units Subcutaneous Q4H     insulin glargine  20 Units Subcutaneous Daily     ipratropium - albuterol 0.5 mg/2.5 mg/3 mL  3 mL Nebulization Q6H     lacosamide  100 mg Oral or Feeding Tube BID     lactulose  20 g Oral or Feeding Tube TID     levETIRAcetam  1,000 mg Oral or Feeding Tube Q24H     micafungin  100 mg Intravenous Q24H     midodrine  10 mg Oral or Feeding Tube TID w/meals     multivitamins w/minerals  15 mL Per Feeding Tube Daily     nystatin  500,000 Units Swish & Swallow 4x Daily     pantoprazole  40 mg Per Feeding Tube Q12H     predniSONE  10 mg Per NG tube Daily     QUEtiapine  50 mg Oral or Feeding Tube At Bedtime     rifaximin  550 mg Per Feeding Tube BID     sodium chloride inhalant  2 mL Nebulization Q6H     tacrolimus  1 mg Oral or Feeding Tube BID          dextrose                Labs:         ROUTINE ICU LABS (Last four results)  CMP  Recent Labs   Lab 12/27/22  1210 12/27/22  1120 12/27/22  0845 12/27/22  0429 12/27/22  0420 12/26/22  0443 12/26/22  0435 12/25/22  0719 12/25/22  0525 12/24/22  0735 12/24/22  0551 12/23/22  0901 12/23/22  0401 12/22/22  1612 12/22/22  1108 12/21/22  0406 12/21/22  0405   NA  --   --   --   --  139  --  136  --   --   --  137  --   135  --  133  --  130*   POTASSIUM  --  3.8  --   --  3.1*  --  3.7  --  3.5  --  3.6   < > 3.3*  --  3.3*  --  3.8   CHLORIDE  --   --   --   --  102  --  101  --   --   --  102  --  101  --  99  --  95   CO2  --   --   --   --  29  --  26  --   --   --  25  --  26  --  26  --  24   ANIONGAP  --   --   --   --  8  --  9  --   --   --  10  --  8  --  8  --  11   *  --  176* 126* 127*   < > 231*   < >  --    < > 228*   < > 205*   < > 193*   < > 120*   BUN  --   --   --   --  52*  --  62*  --   --   --  37*  --  48*  --  44*  --  55*   CR  --   --   --   --  2.61*  --  3.08*  --   --   --  2.25*  --  2.83*  --  2.45*  --  2.66*   GFRESTIMATED  --   --   --   --  28*  --  23*  --   --   --  33*  --  25*  --  30*  --  27*   KELLY  --   --   --   --  8.1*  --  8.2*  --   --   --  8.7  --  7.4*  --  7.6*  --  7.5*   MAG  --   --   --   --   --   --  2.0  --   --   --  1.9  --  1.8  --   --   --   --    PHOS  --   --   --   --  2.4*  --  3.1  --  2.9  --  2.1*  --  4.3  --  4.4  --  5.8*   PROTTOTAL  --   --   --   --   --   --   --   --   --   --   --   --  4.6*  --  4.9*  --  4.7*   ALBUMIN  --   --   --   --  2.1*  --  1.9*  --   --   --  2.5*  --  1.9*   < > 2.1*  --  2.0*  1.9*   BILITOTAL  --   --   --   --   --   --   --   --   --   --   --   --  0.6  --  0.8  --  1.1   ALKPHOS  --   --   --   --   --   --   --   --   --   --   --   --  198*  --  163*  --  133   AST  --   --   --   --   --   --   --   --   --   --   --   --  14  --  16  --  20   ALT  --   --   --   --   --   --   --   --   --   --   --   --  17  --  17  --  17    < > = values in this interval not displayed.     CBC  Recent Labs   Lab 12/27/22  0420 12/26/22  0436 12/24/22  1808 12/23/22  0401   WBC 7.5 4.8 5.8 5.3   RBC 2.76* 2.54* 2.68* 2.57*   HGB 8.8* 8.1* 8.4* 8.2*   HCT 29.4* 26.1* 28.4* 26.6*   * 103* 106* 104*   MCH 31.9 31.9 31.3 31.9   MCHC 29.9* 31.0* 29.6* 30.8*   RDW 18.7* 18.1* 18.0* 17.9*   PLT 63* 55* 58* 76*      INR  Recent Labs   Lab 12/21/22  1315   INR 1.36*     Arterial Blood Gas  No lab results found in last 7 days.        Cultures:      Recent Labs   Lab 12/26/22  1415 12/26/22  1410 12/22/22  1318 12/22/22  1315 12/21/22  1526   CULTURE No growth after 12 hours No growth after 12 hours Culture in progress  Isolated in broth only Lactobacillus species* Culture in progress  Isolated in broth only Lactobacillus species* No growth after 5 days  No Growth             Imaging/Other results:     Recent Results (from the past 24 hour(s))   XR Chest Port 1 View    Narrative    XR CHEST PORT 1 VIEW   12/27/2022 3:48 PM     HISTORY: Shortness of breath, mucous plug?    COMPARISON: Chest radiograph 12/24/2022      Impression    IMPRESSION: Stable cardiomediastinal silhouette with unchanged  position of right upper extremity PICC and dialysis catheter.  Tracheostomy tube tip overlies the midthoracic trachea. Persistent  bilateral pleural effusions with associated bibasilar atelectasis. No  new airspace consolidation or lobar atelectasis. No pneumothorax.  Bones are unchanged. Percutaneous gastrostomy tube partially  visualized.    ABEBA HER MD         SYSTEM ID:  [id number]                       Assessment and Plan:     57 year old male with paroxysmal A. fib, Liver transplant (2016), CHRISTIAN on BiPAP, h/o CVA and hypertension. Patient  s/p Trach/PEG following acu had Rt sided Chest tube placed for hydroptx on 12/12/22 and  transferred to [facility] on 12/14/22 for CIP.  Pt  noted to have bloody stool on 12/15 and 12/16 and had left Ptx which required left sided chest tube leading to ~900ml of bloody output. Patient with PEA arrest 12/20/22 and then seizure while on HD.  Overall stable and getting close to being ready for transfer back to LT[facility].    Neurology and Psychiatry:  1. Seizure seconday to strokes and HHV 6 --on vimpat and keppra changed to enteral Sunday   2.  Baseline multifactorial encephalopathy secondary to his  ongoing critical illness and prior cardiac arrests and prior strokes and cirrhosis with hepatic encephalopathy.--but seems fairly clear in last few days  - MRI head from 12/20/22 -No PRES or leptomeningeal enhancement but scattered embolic tiny infarcts   -  LP 12/22/22- Only +ve for HHV6  3. H/o Embolic CVA: Elliquis restarted   Plan:  1.  Ganciclovir for the moment  3. Keppra and vimpat change to enteral dosing       Pulmonary:  PEA/respiratory arrest prior to admission. Bronchoscopy 12/20/22-  S/P Trach (11/29) after failed extubation X 2. New Left Ptx with acute on hypoxic respiratory failure and ? hemithorax: Pigtail placed on 12/16/2022 and significant decrease in volume of left pneumothorax.  Chest tube was removed on 1219.  Placed on trach dome this morning which went okay for quite a while but had episode of significant desaturation presumed secondary to mucous plugging.  X-ray did not show anything that would be amenable to bronchoscopy.  Plan:  1.  We will alternate trach dome and pressure support going forward.  We will try two MetaNebs and will schedule saline nebs for 24 hours      Cardiovascular system:  PEA arrest again prior to this admission. has had a prior in the context of respiratory arrest at the beginning of his previous admission.  No structural cardiac disease but does have A. Fib.  Anticoagulation been restarted with stable labs although high risk for bleeding seconday to thrombocytopenia.  Also has developed baseline hypotension and is on midodrine 10 mg 3 times daily.  Plan:  Continue Eliquis.  Continue current midodrine dose      Renal/Electrolytes:  1. LORETTA: was on CRRT but now on iHD.  No return of renal function.  - [facility] schedule     ID:  1. 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 4. 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   2. H/o Strep/Parainfluenza infection: Completed treatment course  3. 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   4. 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   5.  Gram-positive cocci growing in the broth 4 days after paracentesis.  Cell count was very low.  6.  Candida in left pleural fluid cultures.    Plan:  1.  Continuing ganciclovir until ID determines whether this is necessary.  2.  They have changed levofloxacin to ceftriaxone.  3.  Sensitivities being sent for the Candida per ID and may be able to narrow therapy    GI/:  Post transplant cirrhosis.  Main manifestations of this are hepatic encephalopathy and ascites.  Hepatologist at [facility] felt that most likely reason for the cirrhosis was metabolic syndrome or alcohol intake.  There was no evidence of rejection on biopsy.  Paracentesis done on 1222 with no positive results.    Plan:  1.  Intermittent paracentesis as needed.  2.  Getting 1 mg  tacrolimus BID and tapering steroids  3. Lactulose and Rifaximin      Endocrine:  1. DM.  Titrating insulin. He is needing some supplemental coverage.  2. On steroids which we are weaning  Plan:  Continue      Nutrition:  At goal tube feeds via percutaneous  G tube tube    Heme/Onc:  1.  Pancytopenia; possibly related to cirrhosis--WBC improved . Chronic splenomegaly-present prior to liver tx but has not regressed.   2. Needs anticoagulation for AFib.     Plan:  1.  Continue eliquis     ICU prophylaxis:      DVT; eliquis      Restraints needed: No     Stress ulcer: IV PPI     Central line: Needed today for IV access/Meds.    Code Status: Full    CC time: 35 minutes excluding time for procedures.

## 2022-12-27 NOTE — PLAN OF CARE
Neuro: No change, follows commands. Nods yes/no or mouths words to make needs known. L side remains weaker.  Respiratory: PS 5/5 overnight, small amount of secretions. Trach.   Cardiac: Edema in LE, unchanged. Sinus tachy. BP stable  GI: TF infusing. No change. Replaced K and Phos, K recheck at 1100, phos recheck 0600 12/28.  : Minimum output from rectal tube. Small amounts of leaking around.   Skin: Blanchable redness on coccyx. PI near trach and peg. Scabbing on nose.   Pain: Denies  Activity: Repositions Q2H  IV infusions: SL

## 2022-12-27 NOTE — PROGRESS NOTES
Physical Therapy: Orders received. Chart reviewed and discussed with care team.? Physical Therapy not indicated due to OT already following for mobility.? Defer discharge recommendations to OT, per chart, plan to discharge back to [facility] when medically appropriate. Will defer PT to next level of care.? Will complete orders.

## 2022-12-27 NOTE — PLAN OF CARE
Neuro:  Alert and follows commands.  CV: SR.   Pulmonary: LS coarse, Trach in place, tolerate trache dome well. Think white inline seretion. Very difficult to understand and communication is frustrating for him.  GI/: TF at goal, tolerate well. Rectal tube in for loose stool/lactulose. Anuric, dialysis pt HD M-W-Fr.  12/26 HD 2 L out, pt tolerate well.  Lines/Gtt:  PICC x 3, R subclavian dialysis cath.TKO.  Family: at bedside.    Time of care [id number]

## 2022-12-27 NOTE — PROVIDER NOTIFICATION
Pt complaining of Trach pulling and pain around the trach. The trach cannula is pulling slightly to the left and looks slightly kinked. Also had RT & charge RN look at site.     Notified Dr. Jackson. Pt is on 30% & 30L o2 sats are 100% and breath sounds are equal. Dr. Jackson was not concerned at this time.

## 2022-12-27 NOTE — PROVIDER NOTIFICATION
[facility] ICU RESPIRATORY NOTE        Date of Admission: 12/16/2022     Date of Intubation (most recent):  Chronic trach     Reason for Mechanical Ventilation: Resp failure     Number of Days on Mechanical Ventilation: 12     Met Criteria for Spontaneous Breathing Trial: Yes     Significant Events Today: None overnight    ABG Results:   Recent Labs   Lab 12/20/22  0719   PH 7.43   PCO2 40   PO2 118*   HCO3 27   O2PER 30       Current Vent Settings: Vent Mode: CPAP/PS  (Continuous positive airway pressure with Pressure Support)  FiO2 (%): 25 %  Resp Rate (Set): 16 breaths/min  Tidal Volume (Set, mL): 450 mL  PEEP (cm H2O): 5 cmH2O  Pressure Support (cm H2O): 5 cmH2O  Resp: 19      Now on SBT 5/5 25%    Plan: TD in the AM     JUANITA ORDAZ RT on 12/27/2022 at 5:28 AM

## 2022-12-27 NOTE — PROVIDER NOTIFICATION
Pt stating he is SOB and its getting harder to breath on trach dome RT at bedside. 30%/30L. Volume increased to 40 and suctioned patient multiple times due to thick tenacious secretions. Pt then dropped his sats down to as low as 38% with deep suctioning on trach dome. Pt was put back on the vent full support and Dr. Fernandes was notified. Pt has equal breath sounds.     Chest xray ordered, nebs order and pt on full vent support for now. Will continue to monitor.

## 2022-12-28 ENCOUNTER — APPOINTMENT (OUTPATIENT)
Dept: SPEECH THERAPY | Facility: CLINIC | Age: 57
DRG: 207 | End: 2022-12-28
Attending: INTERNAL MEDICINE
Payer: COMMERCIAL

## 2022-12-28 LAB
ALBUMIN SERPL-MCNC: 1.7 G/DL (ref 3.4–5)
ANION GAP SERPL CALCULATED.3IONS-SCNC: 7 MMOL/L (ref 3–14)
BACTERIA FLD CULT: ABNORMAL
BUN SERPL-MCNC: 62 MG/DL (ref 7–30)
CALCIUM SERPL-MCNC: 7.7 MG/DL (ref 8.5–10.1)
CHLORIDE BLD-SCNC: 103 MMOL/L (ref 94–109)
CO2 SERPL-SCNC: 29 MMOL/L (ref 20–32)
CREAT SERPL-MCNC: 3.15 MG/DL (ref 0.66–1.25)
GFR SERPL CREATININE-BSD FRML MDRD: 22 ML/MIN/1.73M2
GLUCOSE BLD-MCNC: 117 MG/DL (ref 70–99)
GLUCOSE BLDC GLUCOMTR-MCNC: 117 MG/DL (ref 70–99)
GLUCOSE BLDC GLUCOMTR-MCNC: 151 MG/DL (ref 70–99)
GLUCOSE BLDC GLUCOMTR-MCNC: 155 MG/DL (ref 70–99)
GLUCOSE BLDC GLUCOMTR-MCNC: 159 MG/DL (ref 70–99)
GLUCOSE BLDC GLUCOMTR-MCNC: 187 MG/DL (ref 70–99)
GLUCOSE BLDC GLUCOMTR-MCNC: 79 MG/DL (ref 70–99)
GLUCOSE BLDC GLUCOMTR-MCNC: 98 MG/DL (ref 70–99)
HHV-6 DNA COPIES/ML, INSTRUMENT: ABNORMAL COPIES/ML
HHV6 DNA SPEC NAA+PROBE-LOG#: 6.3 {LOG_COPIES}/ML
PHOSPHATE SERPL-MCNC: 2.8 MG/DL (ref 2.5–4.5)
PHOSPHATE SERPL-MCNC: 2.8 MG/DL (ref 2.5–4.5)
POTASSIUM BLD-SCNC: 3.7 MMOL/L (ref 3.4–5.3)
POTASSIUM BLD-SCNC: 3.7 MMOL/L (ref 3.4–5.3)
SODIUM SERPL-SCNC: 139 MMOL/L (ref 133–144)

## 2022-12-28 PROCEDURE — 94640 AIRWAY INHALATION TREATMENT: CPT

## 2022-12-28 PROCEDURE — 94640 AIRWAY INHALATION TREATMENT: CPT | Mod: 76

## 2022-12-28 PROCEDURE — 250N000013 HC RX MED GY IP 250 OP 250 PS 637: Performed by: INTERNAL MEDICINE

## 2022-12-28 PROCEDURE — 250N000011 HC RX IP 250 OP 636: Performed by: INTERNAL MEDICINE

## 2022-12-28 PROCEDURE — 94668 MNPJ CHEST WALL SBSQ: CPT

## 2022-12-28 PROCEDURE — 99291 CRITICAL CARE FIRST HOUR: CPT | Performed by: INTERNAL MEDICINE

## 2022-12-28 PROCEDURE — 82040 ASSAY OF SERUM ALBUMIN: CPT | Performed by: STUDENT IN AN ORGANIZED HEALTH CARE EDUCATION/TRAINING PROGRAM

## 2022-12-28 PROCEDURE — 250N000012 HC RX MED GY IP 250 OP 636 PS 637: Performed by: INTERNAL MEDICINE

## 2022-12-28 PROCEDURE — 99292 CRITICAL CARE ADDL 30 MIN: CPT | Performed by: INTERNAL MEDICINE

## 2022-12-28 PROCEDURE — 999N000157 HC STATISTIC RCP TIME EA 10 MIN

## 2022-12-28 PROCEDURE — 258N000003 HC RX IP 258 OP 636: Performed by: SPECIALIST

## 2022-12-28 PROCEDURE — 92597 ORAL SPEECH DEVICE EVAL: CPT | Mod: GN | Performed by: SPEECH-LANGUAGE PATHOLOGIST

## 2022-12-28 PROCEDURE — 94003 VENT MGMT INPAT SUBQ DAY: CPT

## 2022-12-28 PROCEDURE — 250N000009 HC RX 250: Performed by: INTERNAL MEDICINE

## 2022-12-28 PROCEDURE — 634N000001 HC RX 634: Performed by: INTERNAL MEDICINE

## 2022-12-28 PROCEDURE — 250N000013 HC RX MED GY IP 250 OP 250 PS 637: Performed by: SURGERY

## 2022-12-28 PROCEDURE — 258N000003 HC RX IP 258 OP 636: Performed by: INTERNAL MEDICINE

## 2022-12-28 PROCEDURE — 90937 HEMODIALYSIS REPEATED EVAL: CPT

## 2022-12-28 PROCEDURE — 200N000001 HC R&B ICU

## 2022-12-28 PROCEDURE — 92609 USE OF SPEECH DEVICE SERVICE: CPT | Mod: GN | Performed by: SPEECH-LANGUAGE PATHOLOGIST

## 2022-12-28 PROCEDURE — 999N000009 HC STATISTIC AIRWAY CARE

## 2022-12-28 PROCEDURE — 84132 ASSAY OF SERUM POTASSIUM: CPT | Performed by: SURGERY

## 2022-12-28 PROCEDURE — 99233 SBSQ HOSP IP/OBS HIGH 50: CPT | Performed by: INTERNAL MEDICINE

## 2022-12-28 PROCEDURE — 90935 HEMODIALYSIS ONE EVALUATION: CPT | Performed by: INTERNAL MEDICINE

## 2022-12-28 PROCEDURE — 84100 ASSAY OF PHOSPHORUS: CPT | Performed by: SURGERY

## 2022-12-28 PROCEDURE — 250N000011 HC RX IP 250 OP 636: Performed by: SPECIALIST

## 2022-12-28 RX ORDER — AMPICILLIN AND SULBACTAM 2; 1 G/1; G/1
3 INJECTION, POWDER, FOR SOLUTION INTRAMUSCULAR; INTRAVENOUS EVERY 24 HOURS
Status: CANCELLED | OUTPATIENT
Start: 2022-12-29

## 2022-12-28 RX ORDER — IPRATROPIUM BROMIDE AND ALBUTEROL SULFATE 2.5; .5 MG/3ML; MG/3ML
3 SOLUTION RESPIRATORY (INHALATION)
Status: CANCELLED | OUTPATIENT
Start: 2022-12-28

## 2022-12-28 RX ORDER — ALBUMIN (HUMAN) 12.5 G/50ML
50 SOLUTION INTRAVENOUS
Status: DISCONTINUED | OUTPATIENT
Start: 2022-12-28 | End: 2022-12-28

## 2022-12-28 RX ORDER — AMPICILLIN AND SULBACTAM 2; 1 G/1; G/1
3 INJECTION, POWDER, FOR SOLUTION INTRAMUSCULAR; INTRAVENOUS EVERY 24 HOURS
Status: DISCONTINUED | OUTPATIENT
Start: 2022-12-29 | End: 2022-12-29 | Stop reason: HOSPADM

## 2022-12-28 RX ORDER — DOXERCALCIFEROL 4 UG/2ML
4 INJECTION INTRAVENOUS
Status: COMPLETED | OUTPATIENT
Start: 2022-12-28 | End: 2022-12-28

## 2022-12-28 RX ORDER — AMPICILLIN AND SULBACTAM 2; 1 G/1; G/1
3 INJECTION, POWDER, FOR SOLUTION INTRAMUSCULAR; INTRAVENOUS EVERY 24 HOURS
Status: CANCELLED | OUTPATIENT
Start: 2022-12-29 | End: 2023-01-12

## 2022-12-28 RX ORDER — ALBUTEROL SULFATE 0.83 MG/ML
2.5 SOLUTION RESPIRATORY (INHALATION)
Status: CANCELLED | OUTPATIENT
Start: 2022-12-28

## 2022-12-28 RX ORDER — AMPICILLIN AND SULBACTAM 2; 1 G/1; G/1
3 INJECTION, POWDER, FOR SOLUTION INTRAMUSCULAR; INTRAVENOUS EVERY 24 HOURS
Status: DISCONTINUED | OUTPATIENT
Start: 2022-12-29 | End: 2022-12-28

## 2022-12-28 RX ADMIN — DOXERCALCIFEROL 4 MCG: 4 INJECTION, SOLUTION INTRAVENOUS at 11:50

## 2022-12-28 RX ADMIN — LEVETIRACETAM 1000 MG: 100 SOLUTION ORAL at 13:19

## 2022-12-28 RX ADMIN — TACROLIMUS 1 MG: 5 CAPSULE ORAL at 21:08

## 2022-12-28 RX ADMIN — CHLORHEXIDINE GLUCONATE 0.12% ORAL RINSE 15 ML: 1.2 LIQUID ORAL at 20:47

## 2022-12-28 RX ADMIN — MIDODRINE HYDROCHLORIDE 10 MG: 5 TABLET ORAL at 18:40

## 2022-12-28 RX ADMIN — PREDNISONE 5 MG: 5 TABLET ORAL at 08:58

## 2022-12-28 RX ADMIN — TACROLIMUS 1 MG: 5 CAPSULE ORAL at 09:10

## 2022-12-28 RX ADMIN — APIXABAN 5 MG: 5 TABLET, FILM COATED ORAL at 08:58

## 2022-12-28 RX ADMIN — INSULIN ASPART 1 UNITS: 100 INJECTION, SOLUTION INTRAVENOUS; SUBCUTANEOUS at 13:09

## 2022-12-28 RX ADMIN — INSULIN ASPART 1 UNITS: 100 INJECTION, SOLUTION INTRAVENOUS; SUBCUTANEOUS at 16:58

## 2022-12-28 RX ADMIN — SODIUM CHLORIDE 200 ML: 9 INJECTION, SOLUTION INTRAVENOUS at 09:51

## 2022-12-28 RX ADMIN — MICAFUNGIN SODIUM 100 MG: 50 INJECTION, POWDER, LYOPHILIZED, FOR SOLUTION INTRAVENOUS at 14:47

## 2022-12-28 RX ADMIN — IPRATROPIUM BROMIDE AND ALBUTEROL SULFATE 3 ML: .5; 3 SOLUTION RESPIRATORY (INHALATION) at 06:55

## 2022-12-28 RX ADMIN — AMPICILLIN SODIUM AND SULBACTAM SODIUM 3 G: 2; 1 INJECTION, POWDER, FOR SOLUTION INTRAMUSCULAR; INTRAVENOUS at 02:05

## 2022-12-28 RX ADMIN — Medication 5 ML: at 09:01

## 2022-12-28 RX ADMIN — IPRATROPIUM BROMIDE AND ALBUTEROL SULFATE 3 ML: .5; 3 SOLUTION RESPIRATORY (INHALATION) at 12:04

## 2022-12-28 RX ADMIN — Medication 40 MG: at 20:49

## 2022-12-28 RX ADMIN — IPRATROPIUM BROMIDE AND ALBUTEROL SULFATE 3 ML: .5; 3 SOLUTION RESPIRATORY (INHALATION) at 19:35

## 2022-12-28 RX ADMIN — Medication 1 DROP: at 08:58

## 2022-12-28 RX ADMIN — INSULIN ASPART 1 UNITS: 100 INJECTION, SOLUTION INTRAVENOUS; SUBCUTANEOUS at 21:09

## 2022-12-28 RX ADMIN — MELATONIN TAB 3 MG 6 MG: 3 TAB at 20:48

## 2022-12-28 RX ADMIN — NYSTATIN 500000 UNITS: 100000 SUSPENSION ORAL at 13:03

## 2022-12-28 RX ADMIN — INSULIN GLARGINE 20 UNITS: 100 INJECTION, SOLUTION SUBCUTANEOUS at 09:09

## 2022-12-28 RX ADMIN — Medication 40 MG: at 08:58

## 2022-12-28 RX ADMIN — APIXABAN 5 MG: 5 TABLET, FILM COATED ORAL at 20:47

## 2022-12-28 RX ADMIN — Medication: at 09:52

## 2022-12-28 RX ADMIN — MIDODRINE HYDROCHLORIDE 10 MG: 5 TABLET ORAL at 08:58

## 2022-12-28 RX ADMIN — OXYCODONE HYDROCHLORIDE 2.5 MG: 5 TABLET ORAL at 13:03

## 2022-12-28 RX ADMIN — HEPARIN SODIUM 1900 UNITS: 1000 INJECTION INTRAVENOUS; SUBCUTANEOUS at 12:12

## 2022-12-28 RX ADMIN — RIFAXIMIN 550 MG: 550 TABLET ORAL at 20:47

## 2022-12-28 RX ADMIN — OXYCODONE HYDROCHLORIDE 2.5 MG: 5 TABLET ORAL at 20:47

## 2022-12-28 RX ADMIN — Medication 1 DROP: at 16:58

## 2022-12-28 RX ADMIN — MIDODRINE HYDROCHLORIDE 10 MG: 5 TABLET ORAL at 13:03

## 2022-12-28 RX ADMIN — NYSTATIN 500000 UNITS: 100000 SUSPENSION ORAL at 21:08

## 2022-12-28 RX ADMIN — NYSTATIN 500000 UNITS: 100000 SUSPENSION ORAL at 08:58

## 2022-12-28 RX ADMIN — CHLORHEXIDINE GLUCONATE 0.12% ORAL RINSE 15 ML: 1.2 LIQUID ORAL at 09:01

## 2022-12-28 RX ADMIN — RIFAXIMIN 550 MG: 550 TABLET ORAL at 08:58

## 2022-12-28 RX ADMIN — EPOETIN ALFA-EPBX 4000 UNITS: 4000 INJECTION, SOLUTION INTRAVENOUS; SUBCUTANEOUS at 11:16

## 2022-12-28 RX ADMIN — Medication 1 DROP: at 21:08

## 2022-12-28 RX ADMIN — FOLIC ACID 1 MG: 1 TABLET ORAL at 08:58

## 2022-12-28 RX ADMIN — IRON SUCROSE 100 MG: 20 INJECTION, SOLUTION INTRAVENOUS at 10:19

## 2022-12-28 RX ADMIN — NYSTATIN 500000 UNITS: 100000 SUSPENSION ORAL at 18:40

## 2022-12-28 RX ADMIN — LACOSAMIDE 100 MG: 10 SOLUTION ORAL at 08:58

## 2022-12-28 RX ADMIN — MULTIVITAMIN 15 ML: LIQUID ORAL at 08:58

## 2022-12-28 RX ADMIN — INSULIN ASPART 1 UNITS: 100 INJECTION, SOLUTION INTRAVENOUS; SUBCUTANEOUS at 09:09

## 2022-12-28 RX ADMIN — QUETIAPINE FUMARATE 50 MG: 50 TABLET ORAL at 21:08

## 2022-12-28 RX ADMIN — LACOSAMIDE 100 MG: 10 SOLUTION ORAL at 21:08

## 2022-12-28 ASSESSMENT — ACTIVITIES OF DAILY LIVING (ADL)
ADLS_ACUITY_SCORE: 51
ADLS_ACUITY_SCORE: 47
ADLS_ACUITY_SCORE: 51
ADLS_ACUITY_SCORE: 51
ADLS_ACUITY_SCORE: 47
ADLS_ACUITY_SCORE: 47
ADLS_ACUITY_SCORE: 51

## 2022-12-28 NOTE — PROGRESS NOTES
[facility] ICU RESPIRATORY NOTE           Date of Admission: 12/16/2022     Date of Intubation (most recent):  Chronic trach     Reason for Mechanical Ventilation: Resp failure     Number of Days on Mechanical Ventilation: 13     Met Criteria for Spontaneous Breathing Trial: Yes     Significant Events Today: None overnight     ABG Results: No lab results found in last 7 days.    Vent Mode: CMV/AC  (Continuous Mandatory Ventilation/ Assist Control)  FiO2 (%): 30 %  Resp Rate (Set): 16 breaths/min  Tidal Volume (Set, mL): 450 mL  PEEP (cm H2O): 5 cmH2O  Pressure Support (cm H2O): 5 cmH2O  Resp: 14    YSABEL Brantley, RRT

## 2022-12-28 NOTE — PROGRESS NOTES
Care Management Follow Up    Length of Stay (days): 12    Expected Discharge Date: 12/28/2022       Additional Information:  [facility] requests later ride be scheduled.  Stretcher transport rescheduled for 1500.  Notified ICU RN.    Addendum @ 1333:  [place] unable to admit patient today due to staffing issue and request ride be rescheduled for tomorrow.  Ride rescheduled for 12/29 at 1200.  Notified Jerrica in ICU and patient's wife Ofe.    Caitlin Lockhart RN, BSN, PHN  Inpatient Care Coordination  [facility]  Phone: [phone]

## 2022-12-28 NOTE — PROGRESS NOTES
"Brief NCC Note    Asked by Dr Fernandes to review chart prior to discharge. Pt was seen by NCC service last week for a number of neurologic issues:    1) Encephalopathy, likely multifactorial (multiorgan dysfunction, septic shock, seizures, embolic strokes, PEA arrest)  2) Multifocal embolic strokes, likely due to afib with intermittently held anticoagulation  3) Seizures, was on keppra, now also on vimpat; breakthrough seizures thought to be due to low keppra levels post-dialysis, 48h EEG seizure-free  4) HHV6+ CSF, per ID no recommendation to treat    We recommend that pt continue on keppra & vimpat for the time being. We will place referral to [facility] General Neurology outpatient clinic for hospital follow-up and potential future titration off AED(s). Continue apixaban for secondary stroke prevention indefinitely.    Karuna Joshi MD  Vascular Neurology  To page me or covering stroke neurology team member, click here: AMCOM   Choose \"On Call\" tab at top, then search dropdown box for \"Neurology Adult\", select location, press Enter, then look for stroke/neuro ICU/telestroke.      "

## 2022-12-28 NOTE — PROGRESS NOTES
Assessment and Plan:   LORETTA: post-arrest. Running MWF. To be discharged to [place] today.   HD for 3h, 2 L UF, R CVC with 400 BFR, 3K 33 HCO3 140 Na. No heparin, he is on apixaban. Hectorol and EPO on the run. Also gettng IV venofer.             Interval History:   Cirrhosis: CARRILLO, S/P liver transplant. On tacrolimus, prednisone,   Afib  CHRISTIAN  HT  Anemia: has had transfusions.   S/P trach, PEG. Adm 12/16/22 with lower GI bleed and L hemothorax.                   Review of Systems:   Trached. Denies sx on dialysis.           Medications:       - MEDICATION INSTRUCTIONS for Dialysis Patients -   Does not apply See Admin Instructions     [START ON 12/29/2022] ampicillin-sulbactam  3 g Intravenous Q24H     apixaban ANTICOAGULANT  5 mg Oral or Feeding Tube BID     B and C vitamin Complex with folic acid  5 mL Per Feeding Tube Daily     carboxymethylcellulose PF  1 drop Both Eyes TID     chlorhexidine  15 mL Mouth/Throat Q12H     doxercalciferol  4 mcg Intravenous Once in dialysis/CRRT     folic acid  1 mg Oral or Feeding Tube Daily     sodium chloride (PF) 0.9%  10 mL Intracatheter Once in dialysis/CRRT    Followed by     heparin  1.3-2.6 mL Intracatheter Once in dialysis/CRRT     sodium chloride (PF) 0.9%  10 mL Intracatheter Once in dialysis/CRRT    Followed by     heparin  1.3-2.6 mL Intracatheter Once in dialysis/CRRT     insulin aspart  1-6 Units Subcutaneous Q4H     insulin glargine  20 Units Subcutaneous Daily     ipratropium - albuterol 0.5 mg/2.5 mg/3 mL  3 mL Nebulization Q6H     lacosamide  100 mg Oral or Feeding Tube BID     lactulose  20 g Oral or Feeding Tube TID     levETIRAcetam  1,000 mg Oral or Feeding Tube Q24H     micafungin  100 mg Intravenous Q24H     midodrine  10 mg Oral or Feeding Tube TID w/meals     multivitamins w/minerals  15 mL Per Feeding Tube Daily     nystatin  500,000 Units Swish & Swallow 4x Daily     pantoprazole  40 mg Per Feeding Tube Q12H     [START ON 12/30/2022] predniSONE   2.5 mg Oral Daily     predniSONE  5 mg Per NG tube Daily     QUEtiapine  50 mg Oral or Feeding Tube At Bedtime     rifaximin  550 mg Per Feeding Tube BID     sodium chloride (PF)  9 mL Intracatheter During Dialysis/CRRT (from stock)     sodium chloride (PF)  9 mL Intracatheter During Dialysis/CRRT (from stock)     sodium chloride inhalant  2 mL Nebulization Q6H     tacrolimus  1 mg Oral or Feeding Tube BID       dextrose Stopped (12/28/22 0100)     Current active medications and PTA medications reviewed, see medication list for details.            Physical Exam:   Vitals were reviewed  Patient Vitals for the past 24 hrs:   BP Temp Temp src Pulse Resp SpO2 Weight   12/28/22 1130 104/76 -- -- 96 14 94 % --   12/28/22 1120 -- -- -- -- -- 100 % --   12/28/22 1115 106/75 -- -- 94 19 100 % --   12/28/22 1100 106/74 -- -- 96 14 98 % --   12/28/22 1045 100/71 -- -- 102 19 98 % --   12/28/22 1040 -- -- -- 112 23 97 % --   12/28/22 1030 (!) 136/90 -- -- 105 24 99 % --   12/28/22 1015 (!) 143/90 -- -- 100 19 100 % --   12/28/22 1000 127/80 -- -- 96 20 100 % --   12/28/22 0945 118/75 -- -- 98 15 100 % --   12/28/22 0930 108/86 -- -- 98 20 99 % --   12/28/22 0918 106/72 -- -- 96 16 99 % --   12/28/22 0917 109/75 -- -- 97 16 99 % --   12/28/22 0915 109/75 -- -- 99 17 97 % --   12/28/22 0910 109/75 -- -- 99 (!) 46 99 % --   12/28/22 0900 117/73 98.1  F (36.7  C) Oral 99 14 99 % --   12/28/22 0800 104/70 -- -- 98 19 100 % --   12/28/22 0700 118/82 -- -- 97 21 99 % --   12/28/22 0655 -- -- -- -- -- 100 % --   12/28/22 0600 106/72 -- -- 95 16 100 % --   12/28/22 0500 107/67 -- -- 94 16 100 % --   12/28/22 0400 114/74 98  F (36.7  C) Axillary 96 17 100 % --   12/28/22 0300 (!) 88/55 -- -- 88 12 100 % --   12/28/22 0209 -- -- -- -- -- -- 96 kg (211 lb 10.3 oz)   12/28/22 0200 101/65 -- -- 93 15 100 % --   12/28/22 0100 97/64 -- -- 86 14 100 % --   12/28/22 0000 97/67 97.9  F (36.6  C) Oral 93 15 100 % --   12/27/22 2305 (!) 84/58 --  -- 91 10 100 % --   22 2300 (!) 81/54 -- -- 92 14 100 % --   22 2200 108/74 -- -- 90 14 100 % --   22 2100 121/84 -- -- 88 10 99 % --   22 2000 120/84 98.1  F (36.7  C) Axillary 93 12 100 % --   22 1900 113/77 -- -- 99 13 100 % --   22 1800 100/67 -- -- 93 14 100 % --   22 1700 109/75 -- -- 101 12 100 % --   22 1609 92/64 -- -- 97 (!) 8 100 % --   22 1600 (!) 89/65 97.8  F (36.6  C) Axillary 98 10 100 % --   22 1525 -- -- -- 111 21 100 % --   22 1510 -- -- -- 112 20 98 % --   22 1500 (!) 139/96 -- -- 113 (!) 41 94 % --   22 1400 (!) 137/90 -- -- 111 23 93 % --   22 1300 121/80 -- -- 111 26 98 % --   22 1200 119/84 98.2  F (36.8  C) Oral 109 24 98 % --   22 1141 -- -- -- 110 21 97 % --       Temp:  [97.8  F (36.6  C)-98.2  F (36.8  C)] 98.1  F (36.7  C)  Pulse:  [] 96  Resp:  [8-46] 14  BP: ()/(54-96) 104/76  FiO2 (%):  [30 %-40 %] 30 %  SpO2:  [93 %-100 %] 94 %    Temperatures:  Current - Temp: 98.1  F (36.7  C); Max - Temp  Av  F (36.7  C)  Min: 97.8  F (36.6  C)  Max: 98.2  F (36.8  C)  Respiration range: Resp  Av.9  Min: 8  Max: 46  Pulse range: Pulse  Av.7  Min: 86  Max: 113  Blood pressure range: Systolic (24hrs), Av , Min:81 , Max:143   ; Diastolic (24hrs), Av, Min:54, Max:96    Pulse oximetry range: SpO2  Av.9 %  Min: 93 %  Max: 100 %    I/O last 3 completed shifts:  In: 1800 [I.V.:360; NG/GT:420]  Out: 810 [Stool:810]      Intake/Output Summary (Last 24 hours) at 2022 1135  Last data filed at 2022 1000  Gross per 24 hour   Intake 1840 ml   Output 810 ml   Net 1030 ml     Alert and responsive  R CVC with no redness or tenderness  Trached, mech vent  LE 1+ edema  Lungs with coarse BS, no wheezing or resp distress  Cor RRR nl S1 S2 no M no rub         Wt Readings from Last 4 Encounters:   22 96 kg (211 lb 10.3 oz)   22 100.2 kg (221 lb)   22 100.2  kg (221 lb)   12/15/22 87 kg (191 lb 12.8 oz)          Data:          Lab Results   Component Value Date     12/28/2022     12/27/2022     12/26/2022     04/20/2020     10/07/2019     02/20/2019    Lab Results   Component Value Date    CHLORIDE 103 12/28/2022    CHLORIDE 102 12/27/2022    CHLORIDE 101 12/26/2022    CHLORIDE 105 08/05/2020    CHLORIDE 106 04/20/2020    CHLORIDE 99 10/07/2019    CHLORIDE 108 08/01/2019    CHLORIDE 106 02/20/2019    Lab Results   Component Value Date    BUN 62 12/28/2022    BUN 52 12/27/2022    BUN 62 12/26/2022    BUN 23 04/20/2020    BUN 18 10/07/2019    BUN 20 02/20/2019      Lab Results   Component Value Date    POTASSIUM 3.7 12/28/2022    POTASSIUM 3.7 12/28/2022    POTASSIUM 3.8 12/27/2022    POTASSIUM 3.9 04/20/2020    POTASSIUM 4.3 10/07/2019    POTASSIUM 4.2 02/20/2019    Lab Results   Component Value Date    CO2 29 12/28/2022    CO2 29 12/27/2022    CO2 26 12/26/2022    CO2 28 04/20/2020    CO2 26 10/07/2019    CO2 24 02/20/2019    Lab Results   Component Value Date    CR 3.15 12/28/2022    CR 2.61 12/27/2022    CR 3.08 12/26/2022    CR 1.06 04/20/2020    CR 1.01 10/07/2019    CR 1.08 02/20/2019        Recent Labs   Lab Test 12/27/22  0420 12/26/22  0436 12/24/22  1808   WBC 7.5 4.8 5.8   HGB 8.8* 8.1* 8.4*   HCT 29.4* 26.1* 28.4*   * 103* 106*   PLT 63* 55* 58*     Recent Labs   Lab Test 12/23/22  0401 12/22/22  1108 12/21/22  0405 12/20/22  1227 12/20/22  0426 12/18/22  0013 12/16/22  2239   AST 14 16 20  --    < >  --  21   ALT 17 17 17  --    < >  --  20   ALKPHOS 198* 163* 133  --    < >  --  251*   BILITOTAL 0.6 0.8 1.1  --    < >  --  0.9   CHANDLER  --   --   --  32  --  38 113*    < > = values in this interval not displayed.       Recent Labs   Lab Test 12/26/22  0435 12/24/22  0551 12/23/22  0401   MAG 2.0 1.9 1.8     Recent Labs   Lab Test 12/28/22  0428 12/27/22  0420 12/26/22  0435   PHOS 2.8  2.8 2.4* 3.1     Recent Labs    Lab Test 12/28/22  0428 12/27/22  0420 12/26/22  0435   KELLY 7.7* 8.1* 8.2*       Lab Results   Component Value Date    KELLY 7.7 (L) 12/28/2022     Lab Results   Component Value Date    WBC 7.5 12/27/2022    HGB 8.8 (L) 12/27/2022    HCT 29.4 (L) 12/27/2022     (H) 12/27/2022    PLT 63 (L) 12/27/2022     Lab Results   Component Value Date     12/28/2022    POTASSIUM 3.7 12/28/2022    POTASSIUM 3.7 12/28/2022    CHLORIDE 103 12/28/2022    CO2 29 12/28/2022     (H) 12/28/2022     Lab Results   Component Value Date    BUN 62 (H) 12/28/2022    CR 3.15 (H) 12/28/2022     Lab Results   Component Value Date    MAG 2.0 12/26/2022     Lab Results   Component Value Date    PHOS 2.8 12/28/2022    PHOS 2.8 12/28/2022       Creatinine   Date Value Ref Range Status   12/28/2022 3.15 (H) 0.66 - 1.25 mg/dL Final   12/27/2022 2.61 (H) 0.66 - 1.25 mg/dL Final   12/26/2022 3.08 (H) 0.66 - 1.25 mg/dL Final   12/24/2022 2.25 (H) 0.66 - 1.25 mg/dL Final   12/23/2022 2.83 (H) 0.66 - 1.25 mg/dL Final   12/22/2022 2.45 (H) 0.66 - 1.25 mg/dL Final   04/20/2020 1.06 0.66 - 1.25 mg/dL Final   10/07/2019 1.01 0.66 - 1.25 mg/dL Final   02/20/2019 1.08 0.66 - 1.25 mg/dL Final   07/10/2018 1.32 (H) 0.66 - 1.25 mg/dL Final   10/31/2017 1.18 0.66 - 1.25 mg/dL Final   10/17/2017 1.26 (H) 0.66 - 1.25 mg/dL Final       Attestation:  I have reviewed today's vital signs, notes, medications, labs and imaging.  Seen during dialysis.      Elvis Mariano MD          a

## 2022-12-28 NOTE — PROGRESS NOTES
[facility]    Infectious Disease Progress Note    Date of Service (when I saw the patient): 12/28/2022     Assessment & Plan   Blanco Osborne is a 57 year old male who was admitted on 12/16/2022.   Assessment:  57 YM with hx of liver transplantation 2016, recent prolonged hopsitalzied from 11/12-12/15 for PEA cardiac arrest pneumococcal/parainfluenza pneumonia with ARDS, septic shock with multiorgan failure, now on HD, s/p trach/PEG , right pneumothorax requiring chest tube . He was discharged to [facility] with a chest tube and has now been hospitalized again with pneumothorax,  with PEA arrest and seizure complicating the course as well as GI bleed . 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 Grew after 5 days in broth cx only, may be a contaminant. However given complicated course, will treat.  -SBP related to lactobacillus sp, no evidence of bowel perforation.  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 Patient is not encephalopathic, the cell count does not suggest encephalitis.   -New onset seizure with evidence of CNS embolic infarcts of unclear source.Has had multiple cardiac arrests, seizures may be related to that, don't think HHV6 is cause of seizures  -Hx of liver transplantation, on tacrolimus  -Recent prolonged hospitalization for pneumococcal sepsis , pneumonia, ARDS, septic shock, multiorgan failure  -s/p Trach/PEG  -PEA cardiac arrest last month and again prior to admission  -HD dependent     Recommendations:  1. Blood cultures no growth   2. HHV6 from CSF sample noted . CSF cell count is stable and patient does not have clinical encephalitis. HHV6 DNA quant in the serum came back high, confirming viremia, but given no fever, no rash, no bone marrow suppression,  no encephalitis based  on CSF count, presentation, alternative reason for seizure which occurred at the time of PEA arrest dont think this require treatment but will recommend reaching out to transplant ID team at [facility] to discuss need for therapy. Patient was given ganciclovir by ICU team.   3. Treat lactobacillus peritonitis with unasyn,  which can be transitioned to oral Augmentin at discharge 4 weeks course.   4. Micafungin for candida isolation in left pleural cultures. Added on sensitivities, generally candida parapsilosis is fluconazole sensitive, I called lab again will take another 3 days for this. Treatment course usually 4 weeks or more based on imaging      ID will continue to follow  Discussed with the ICU team    Gurwinder Lanza MD    Interval History   Tolerating antibiotics ok   No new rashes or issues with antibiotics   Labs reviewed   No changes to past medical, social or family history   Awake, responding to ques with gesturing   A 10 point ROS was done and is negative other than noted in the interval history above     Physical Exam   Temp: 98.1  F (36.7  C) Temp src: Oral BP: 106/74 Pulse: 96   Resp: 14 SpO2: 98 % O2 Device: Trach dome Oxygen Delivery: 45 LPM  Vitals:    12/26/22 0401 12/27/22 0000 12/28/22 0209   Weight: 96.2 kg (212 lb 1.3 oz) 94.8 kg (208 lb 15.9 oz) 96 kg (211 lb 10.3 oz)     Vital Signs with Ranges  Temp:  [97.8  F (36.6  C)-98.2  F (36.8  C)] 98.1  F (36.7  C)  Pulse:  [] 96  Resp:  [8-46] 14  BP: ()/(54-96) 106/74  FiO2 (%):  [30 %-40 %] 30 %  SpO2:  [93 %-100 %] 98 %    Constitutional: Awake  Lungs: Clear to auscultation bilaterally, no crackles or wheezing  Cardiovascular: Regular rate and rhythm, normal S1 and S2, and no murmur noted  Abdomen: quite distended  Skin: multiple ecchymosis, bruises   Other:    Medications     dextrose Stopped (12/28/22 0100)       - MEDICATION INSTRUCTIONS for Dialysis Patients -   Does not apply See Admin Instructions     [START ON 12/29/2022]  ampicillin-sulbactam  3 g Intravenous Q24H     apixaban ANTICOAGULANT  5 mg Oral or Feeding Tube BID     B and C vitamin Complex with folic acid  5 mL Per Feeding Tube Daily     carboxymethylcellulose PF  1 drop Both Eyes TID     chlorhexidine  15 mL Mouth/Throat Q12H     doxercalciferol  4 mcg Intravenous Once in dialysis/CRRT     epoetin mirta-epbx  4,000 Units Intravenous Once in dialysis/CRRT     folic acid  1 mg Oral or Feeding Tube Daily     sodium chloride (PF) 0.9%  10 mL Intracatheter Once in dialysis/CRRT    Followed by     heparin  1.3-2.6 mL Intracatheter Once in dialysis/CRRT     sodium chloride (PF) 0.9%  10 mL Intracatheter Once in dialysis/CRRT    Followed by     heparin  1.3-2.6 mL Intracatheter Once in dialysis/CRRT     insulin aspart  1-6 Units Subcutaneous Q4H     insulin glargine  20 Units Subcutaneous Daily     ipratropium - albuterol 0.5 mg/2.5 mg/3 mL  3 mL Nebulization Q6H     lacosamide  100 mg Oral or Feeding Tube BID     lactulose  20 g Oral or Feeding Tube TID     levETIRAcetam  1,000 mg Oral or Feeding Tube Q24H     micafungin  100 mg Intravenous Q24H     midodrine  10 mg Oral or Feeding Tube TID w/meals     multivitamins w/minerals  15 mL Per Feeding Tube Daily     nystatin  500,000 Units Swish & Swallow 4x Daily     pantoprazole  40 mg Per Feeding Tube Q12H     [START ON 12/30/2022] predniSONE  2.5 mg Oral Daily     predniSONE  5 mg Per NG tube Daily     QUEtiapine  50 mg Oral or Feeding Tube At Bedtime     rifaximin  550 mg Per Feeding Tube BID     sodium chloride (PF)  9 mL Intracatheter During Dialysis/CRRT (from stock)     sodium chloride (PF)  9 mL Intracatheter During Dialysis/CRRT (from stock)     sodium chloride inhalant  2 mL Nebulization Q6H     tacrolimus  1 mg Oral or Feeding Tube BID       Data   All microbiology laboratory data reviewed.  Recent Labs   Lab Test 12/27/22  0420 12/26/22  0436 12/24/22  1808   WBC 7.5 4.8 5.8   HGB 8.8* 8.1* 8.4*   HCT 29.4* 26.1* 28.4*    * 103* 106*   PLT 63* 55* 58*     Recent Labs   Lab Test 12/28/22  0428 12/27/22  0420 12/26/22  0435   CR 3.15* 2.61* 3.08*     No lab results found.  Recent Labs   Lab Test 09/28/16  1600 09/23/16  1458 09/20/16  1901   CULT No VRE isolated No growth Culture negative for acid fast bacilli  Assayed at [facility] [address]    Culture negative after 4 weeks  No anaerobes isolated  No growth       All cultures:  Recent Labs   Lab 12/26/22  1415 12/26/22  1410 12/22/22  1318 12/22/22  1315 12/21/22  1526   CULTURE No growth after 1 day No growth after 1 day Isolated in broth only Lactobacillus species* Culture in progress  Isolated in broth only Lactobacillus species* No growth after 6 days  No Growth      Blood culture:  Results for orders placed or performed during the hospital encounter of 12/16/22   Blood Culture Peripheral Blood    Specimen: Peripheral Blood   Result Value Ref Range    Culture No growth after 1 day    Blood Culture Arm, Right    Specimen: Arm, Right; Blood   Result Value Ref Range    Culture No growth after 1 day    Results for orders placed or performed during the hospital encounter of 12/16/22   Blood Culture Peripheral Blood    Specimen: Peripheral Blood   Result Value Ref Range    Culture No Growth    Results for orders placed or performed during the hospital encounter of 11/12/22   Blood Culture Line, venous    Specimen: Line, venous; Blood   Result Value Ref Range    Culture No Growth    Blood Culture Hand, Right    Specimen: Hand, Right; Blood   Result Value Ref Range    Culture No Growth    Blood Culture Hand, Right    Specimen: Hand, Right; Blood   Result Value Ref Range    Culture No Growth    Blood Culture Hand, Left    Specimen: Hand, Left; Blood   Result Value Ref Range    Culture No Growth    Blood Culture Peripheral Blood    Specimen: Peripheral Blood   Result Value Ref Range    Culture No Growth    Blood Culture Peripheral Blood    Specimen:  Peripheral Blood   Result Value Ref Range    Culture Positive on the 1st day of incubation (A)     Culture Streptococcus pneumoniae (AA)        Susceptibility    Streptococcus pneumoniae - KARAN     Levofloxacin 1.5 Susceptible ug/mL     Meropenem 0.012 Susceptible ug/mL     Vancomycin 0.50 Susceptible ug/mL     Penicillin (meningitis) 0.016 Susceptible ug/mL     Penicillin (non-meningitis) 0.016 Susceptible ug/mL     Penicillin(oral) 0.016 Susceptible ug/mL     Ceftriaxone (non-meningitis) 0.016 Susceptible ug/mL     Ceftriaxone (meningitis) 0.016 Susceptible ug/mL      Urine culture:  Results for orders placed or performed during the hospital encounter of 09/20/16   Urine Culture Aerobic Bacterial   Result Value Ref Range    Specimen Description Catheterized Urine     Culture Micro No growth     Micro Report Status FINAL 09/25/2016

## 2022-12-28 NOTE — PROGRESS NOTES
[facility]    Discharge Summary  [facility] Intensive Care    Date of Admission:  12/16/2022  Date of Discharge:  12/28/2022  Discharging Provider: Laura Fernandes MD    Discharge Diagnoses   Principal Problem:    Pneumothorax on left  Active Problems:    Cirrhosis of liver (H)    Liver transplanted (H)    Acute kidney injury (H)    Embolic stroke (H)    Encephalopathy    Chronic kidney disease, stage V (H)    PEA (Pulseless electrical activity) (H)    Seizures (H)    Human herpesvirus 6 viremia    Hypotension, unspecified hypotension type      History of Present Illness   Blanco Osborne is an 57 year old male who presented with hemopneumothorax as a transfer from [facility].     Hospital Course   .     57 year old male with paroxysmal A. fib, Liver transplant (2016), CHRISTIAN on BiPAP, h/o CVA and hypertension. Patient  s/p Trach/PEG following acu had Rt sided Chest tube placed for hydroptx on 12/12/22 and  transferred to [place] on 12/14/22 for CIP.  Pt  noted to have bloody stool on 12/15 and 12/16 and had left Ptx which required left sided chest tube leading to ~900ml of bloody output. Patient with PEA arrest 12/20/22 and then seizure while on HD.  Overall stable and getting close to being ready for transfer back to LTAC.  The following problems were addressed during his hospitalization listed by organ system.      Neurology and Psychiatry:  1. Seizure after hypoxic event.  This is in the context of baseline multifactorial encephalopathy secondary to his ongoing critical illness and prior cardiac arrests and prior strokes and cirrhosis with hepatic encephalopathy.MRI head from 12/20/22 -No PRES or leptomeningeal enhancement but scattered.  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 Unlikely to be a pathogen and Gancyclovir was stopped.   Plan:  1.  Keppra and vimpat indefinitely.  Neurology follow-up.  2.  Anticoagulation indefinitely for secondary stroke prevention.      Pulmonary:  Acute now chronic  respiratory failure requiring tracheostomy..  Initial event was parainfluenza and strep pneumo pneumonia.  Had failed extubation x2 and tracheostomy performed last hospitalization.  Had additional complications of bilateral pneumothoraces.  (Most recently was a hydropneumothorax where fluid grew Candida..  Chest tube was placed and removed during this hospitalization.  Had decent tolerance for pressure support and trach dome but after few hours and trach dome 1227 had a pretty significant episode of desaturation.   Plan:  1.  We will alternate trach dome and pressure support going forward.    2.  At least 4 weeks of antimicrobial for Candida detailed in ID section    Cardiovascular system:  PEA arrest prior to his previous admission and 1 episode of PEA associated with desaturation on 1220.  No structural cardiac disease but does have A. Fib which neurologist thought might have been contributory to his embolic strokes.  Anticoagulation been restarted with stable labs although high risk for bleeding seconday to thrombocytopenia.  Also has developed baseline hypotension and is on midodrine 10 mg 3 times daily.  Plan:  Continue Eliquis.  Continue current midodrine dose    Renal/Electrolytes:  1. LORETTA: Now on iHD.  No return of renal function.  - MWF schedule     ID:  1. 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 4.  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  Have had some conversations within our group and with transplant ID and general infectious disease.  General consensus is that ganciclovir probably could be discontinued  2. H/o Strep/Parainfluenza infection last hospitalization. Completed treatment course  3. 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  4.  Lactobacillus growing in the broth 4 days after paracentesis.  Cell count was very low.  5.  Candida in left pleural fluid cultures.  Sensitivities not resulted.  6.   immunosuppressed on tacrolimus.  Discussed with transplant service at the [facility] who felt that given the absence  of rejection on his most recent biopsy and ongoing issues with infection continuing with 1 twice daily tacrolimus is the best current option.  They have seen his subtherapeutic trough levels.    Plan:  1.  Confirm with transplant ID whether or not we can stop ganciclovir.  2.  Unasyn for 2 weeks total.  Could consider changing this to Augmentin.  3.  At least 4 weeks of antimicrobial for the Candida.  If sensitive to fluconazole could de-escalate from micafungin.  4.  Continue tacrolimus as above and we are tapering steroids    GI/:  Post transplant cirrhosis.  Main manifestations of this are hepatic encephalopathy and ascites.  Hepatologist at [facility] felt that most likely reason for the cirrhosis was metabolic syndrome or alcohol intake.  There was no evidence of rejection on biopsy and there was some evidence of regeneration..  Paracentesis done on 12/22 with lack of bacillus in the broth indicating SBP low cell count was very low  plan:  1.  Intermittent paracentesis as needed.  2.  Getting 1 mg  tacrolimus BID and tapering steroids   3. Lactulose and Rifaximin as above  4.  Treat SBP for 2 weeks.  5.  He has an appointment in April with Dr. Simms if he is out of the hospital.      Endocrine:  1. DM.  Titrating insulin. He is needing some supplemental coverage.  2. On steroids which we are weaning  Plan:  Continue      Nutrition:  At goal tube feeds via percutaneous  G tube tube      Heme/Onc:  1.  Pancytopenia; possibly related to cirrhosis--WBC improved . Chronic splenomegaly-present prior to liver tx but has not regressed.   2. Needs anticoagulation for AFib.   Plan:  1.  Continue eliquis     ICU prophylaxis:      DVT; eliquis      Restraints needed: No     Stress ulcer: IV PPI     Central line: Needed today for IV access/Meds.    Code Status: Full    CC time: 80 minutes excluding time for procedures      Laura Fernandes MD    Significant Results and Procedures   EEG  Bronchoscopy  Arterial  line  CXR  Paracentesis  CTs  MRIs      Pending Results   These results will be followed up by LT[facility]    Unresulted Labs Ordered in the Past 30 Days of this Admission     Date and Time Order Name Status Description    12/26/2022  2:05 PM Human Herpes Type 6 (HHV-6), Quantitative by PCR In process     12/26/2022  2:03 PM Blood Culture Peripheral Blood Preliminary     12/26/2022  2:03 PM Blood Culture Arm, Right Preliminary     12/22/2022 12:43 PM Acid-Fast Bacilli Culture and Stain In process     12/22/2022 12:43 PM Anaerobic bacterial culture Preliminary     12/21/2022  1:08 PM Serum Collection (Accompanies: [id number] or [id number] CSF Testing) In process     12/21/2022  1:08 PM Oligoclonal banding In process     12/21/2022 12:40 PM Fungus Culture, non-blood Preliminary     12/16/2022  9:45 PM Pleural fluid Aerobic Bacterial Culture Routine Preliminary     12/16/2022  4:15 PM Prepare red blood cells (unit) Preliminary     12/16/2022  7:30 AM Prepare red blood cells (unit) Preliminary     12/16/2022  7:30 AM Prepare red blood cells (unit) Preliminary           Code Status   Full Code    Primary Care Physician   Ki Powers        Time Spent on this Encounter   80 minutes CC time      Discharge Disposition   Transferred to [facility]  Condition at discharge: Stable    Consultations This Hospital Stay   NEPHROLOGY IP CONSULT  GASTROENTEROLOGY IP CONSULT  VASCULAR ACCESS ADULT IP CONSULT  WOUND OSTOMY CONTINENCE NURSE  IP CONSULT  THORACIC SURGERY IP CONSULT  INTERVENTIONAL RADIOLOGY ADULT/PEDS IP CONSULT  NUTRITION SERVICES ADULT IP CONSULT  NEPHROLOGY IP CONSULT  NUTRITION SERVICES ADULT IP CONSULT  WOUND OSTOMY CONTINENCE NURSE  IP CONSULT  NEUROLOGY CRITICAL CARE ADULT IP CONSULT  NUTRITION SERVICES ADULT IP CONSULT  PHARMACY IP CONSULT  PHYSICAL THERAPY ADULT IP CONSULT  OCCUPATIONAL THERAPY ADULT IP CONSULT  PHARMACY IP CONSULT  PHARMACY IP CONSULT  CARE MANAGEMENT / SOCIAL WORK IP CONSULT  INFECTIOUS DISEASES IP  CONSULT  SPEECH LANGUAGE PATH ADULT IP CONSULT  PHARMACY IP CONSULT  SPEECH LANGUAGE PATH ADULT IP CONSULT  WOUND OSTOMY CONTINENCE NURSE  IP CONSULT  WOUND OSTOMY CONTINENCE NURSE  IP CONSULT    Discharge Orders     Discharge Medications   Current Discharge Medication List      CONTINUE these medications which have NOT CHANGED    Details   acetylcysteine (MUCOMYST) 20 % neb solution Take 2 mLs by nebulization 2 times daily  Qty: 100 mL, Refills: 1    Associated Diagnoses: Critical illness myopathy      albuterol (PROVENTIL) (2.5 MG/3ML) 0.083% neb solution Take 1 vial (2.5 mg) by nebulization every 2 hours as needed for shortness of breath  Qty: 90 mL, Refills: 1    Associated Diagnoses: Critical illness myopathy      apixaban ANTICOAGULANT (ELIQUIS) 5 MG tablet 1 tablet (5 mg) by Oral or Feeding Tube route 2 times daily  Qty: 60 tablet, Refills: 1    Associated Diagnoses: Critical illness myopathy      calcium carbonate (TUMS) 500 MG chewable tablet Take 1-2 chew tab by mouth 4 times daily as needed for heartburn      carboxymethylcellulose PF (REFRESH PLUS) 0.5 % ophthalmic solution Place 1 drop into both eyes 3 times daily  Qty: 50 each, Refills: 1    Associated Diagnoses: Critical illness myopathy      famotidine (PEPCID) 20 MG tablet Take 20 mg by mouth 2 times daily as needed (heartburn)      folic acid 5 MG/ML injection Inject 0.2 mLs (1 mg) into the vein daily  Qty: 50 mL, Refills: 1    Associated Diagnoses: Critical illness myopathy      glucose 40 % (400 mg/mL) gel Take 15-30 g by mouth every 15 minutes as needed for low blood sugar  Qty: 100 mL, Refills: 1    Associated Diagnoses: Critical illness myopathy      hydrocortisone sodium succinate PF (SOLU-CORTEF) 100 MG injection Inject 0.5 mLs (25 mg) into the vein every 6 hours  Qty: 100 mL, Refills: 1    Associated Diagnoses: Critical illness myopathy      insulin glargine (LANTUS PEN) 100 UNIT/ML pen Inject 30 Units Subcutaneous every morning (before  breakfast)  Qty: 15 mL, Refills: 1    Comments: If Lantus is not covered by insurance, may substitute Basaglar or Semglee or other insulin glargine product per insurance preference at same dose and frequency.    Associated Diagnoses: Critical illness myopathy      ipratropium - albuterol 0.5 mg/2.5 mg/3 mL (DUONEB) 0.5-2.5 (3) MG/3ML neb solution Take 1 vial (3 mLs) by nebulization 4 times daily  Qty: 90 mL, Refills: 1    Associated Diagnoses: Critical illness myopathy      lactulose (CHRONULAC) 10 GM/15ML solution 30 mLs (20 g) by Oral or Feeding Tube route 3 times daily  Qty: 100 mL, Refills: 1    Associated Diagnoses: Critical illness myopathy      midodrine (PROAMATINE) 10 MG tablet 1 tablet (10 mg) by Oral or Feeding Tube route 3 times daily (with meals)  Qty: 100 tablet, Refills: 1    Associated Diagnoses: Critical illness myopathy      Multiple Vitamins-Minerals (MULTIVITAMINS W/MINERALS) liquid 15 mLs by Per Feeding Tube route daily  Qty: 100 mL, Refills: 1    Associated Diagnoses: Critical illness myopathy      nystatin (MYCOSTATIN) 974263 UNIT/ML suspension Swish and swallow 5 mLs (500,000 Units) in mouth 4 times daily  Qty: 100 mL, Refills: 1    Associated Diagnoses: Critical illness myopathy      ondansetron (ZOFRAN ODT) 4 MG ODT tab Take 1 tablet (4 mg) by mouth every 6 hours as needed for nausea or vomiting  Qty: 30 tablet, Refills: 1    Associated Diagnoses: Critical illness myopathy      oxyCODONE (ROXICODONE) 5 MG tablet 1 tablet (5 mg) by Per Feeding Tube route every 4 hours as needed for moderate pain (4-6)  Qty: 60 tablet, Refills: 0    Associated Diagnoses: Critical illness myopathy      pantoprazole (PROTONIX) 2 mg/mL SUSP suspension 20 mLs (40 mg) by Per Feeding Tube route every morning (before breakfast)  Qty: 100 mL, Refills: 1    Associated Diagnoses: Critical illness myopathy      rifaximin (XIFAXAN) 550 MG TABS tablet 1 tablet (550 mg) by Per Feeding Tube route 2 times daily  Qty: 60  tablet, Refills: 1    Associated Diagnoses: Critical illness myopathy      tacrolimus (GENERIC EQUIVALENT) 1 mg/mL suspension 1 mL (1 mg) by Oral or Feeding Tube route 2 times daily  Qty: 100 mL, Refills: 4    Associated Diagnoses: Critical illness myopathy      !! order for DME Equipment being ordered: Compression knee high stockings for B LE lymphedema 20-30mmHg  Qty: 1 each, Refills: 0    Associated Diagnoses: Lymphedema of both lower extremities      !! order for DME Equipment being ordered: Class 2 (30-40mmHg) compression knee high stockings, night time knee high compression alternative, bandaging supplies  Qty: 1 each, Refills: 0    Associated Diagnoses: Lymphedema of both lower extremities       !! - Potential duplicate medications found. Please discuss with provider.      STOP taking these medications       epoetin mirta-epbx (RETACRIT) 17117 UNIT/ML injection Comments:   Reason for Stopping:             Allergies   No Known Allergies  Data

## 2022-12-28 NOTE — PLAN OF CARE
SLP - PMSV Communication Evaluation - Patient was seen for a Passy [place]el[place] Speaking Valve (PMSV) re-evaluation this am.  Pt tolerated PMSV placement while on pressure support for 5 minutes with stable respiratory status and voicing achieved.  Good secretion management was noted during the trial.  Recommend continued PMSV trials with SLP and RT supervision only at this time.  If pt tolerates PMSV placement for 30+ minutes with SLP x 2, pt may be appropriate to initiate a SLP swallow evaluation.  Recommend continued SLP treatment after discharge to [facility]. (full report to follow)

## 2022-12-28 NOTE — PLAN OF CARE
Pt is alert but forgetful at times. Complains of pain , prn oxy given. ST, BP stable. Trach dome most of day, full vent support this afternoon(see previous note). Brother updated at bedside. Coarse LS thick creamy/yellow secretions. ABD distended, tolerating tf. Loose stools w. Rectal tube in place. Up to chair today. Wife updated at bedside, plan to go to [place] tomorrow at 2pm.

## 2022-12-28 NOTE — PROGRESS NOTES
Care Management Discharge Note    Discharge Date:         Discharge Disposition: [facility]    Discharge Services:      Discharge DME:      Discharge Transportation: health plan transportation    Private pay costs discussed: Not applicable    PAS Confirmation Code:    Patient/family educated on Medicare website which has current facility and service quality ratings:      Education Provided on the Discharge Plan:    Persons Notified of Discharge Plans:   Patient/Family in Agreement with the Plan: yes    Handoff Referral Completed: No    Additional Information:  Confirmed [facility] stretcher transportation at 1400 today.  PCS completed and faxed.    Caitlin Lockhart RN, BSN, PHN  Inpatient Care Coordination  [facility]  Phone: [phone]

## 2022-12-28 NOTE — PROGRESS NOTES
[facility]  ([address])    Mr. Osborne has been accepted to return and admit to [facility] today.       Ride: 2:00pm    RN to RN report: Please call [address] at [phone] for nurse to nurse report.before the pt discharges.     Accepting MD: Dr. Fritz. Dr. Fernandes will reach out to Dr. Fritz for a provider to provider report before the pt discharges.    Documentation needed prior to discharge: A visible discharge summary and discharge/readmission orders             Kalyani Villagomez, PT  [facility] Referral Specialist    [facility]      [address] [place] [id number]  Admissions Office: [phone]  Fax: [phone]     CONFIDENTIAL Protected under [place] Statute  145.61 et seq

## 2022-12-28 NOTE — PROGRESS NOTES
Potassium   Date Value Ref Range Status   12/28/2022 3.7 3.4 - 5.3 mmol/L Final   12/28/2022 3.7 3.4 - 5.3 mmol/L Final   04/20/2020 3.9 3.4 - 5.3 mmol/L Final     Hemoglobin   Date Value Ref Range Status   12/27/2022 8.8 (L) 13.3 - 17.7 g/dL Final   04/20/2020 14.3 13.3 - 17.7 g/dL Final     Creatinine   Date Value Ref Range Status   12/28/2022 3.15 (H) 0.66 - 1.25 mg/dL Final   04/20/2020 1.06 0.66 - 1.25 mg/dL Final     Urea Nitrogen   Date Value Ref Range Status   12/28/2022 62 (H) 7 - 30 mg/dL Final   04/20/2020 23 7 - 30 mg/dL Final     Sodium   Date Value Ref Range Status   12/28/2022 139 133 - 144 mmol/L Final   04/20/2020 139 133 - 144 mmol/L Final     INR   Date Value Ref Range Status   12/21/2022 1.36 (H) 0.85 - 1.15 Final   10/07/2019 1.20 (H) 0.86 - 1.14 Final       DIALYSIS PROCEDURE NOTE  Hepatitis status of previous patient on machine log was checked and verified ok to use with this patients hepatitis status.  Patient dialyzed for 3 hrs. on a K3 bath with a net fluid removal of  2L.  A BFR of 400 ml/min was obtained via a R tunneled CVC.      The treatment plan was discussed with Dr. Mariano during the treatment.    Total heparin received during the treatment: 0 units.   Line flushed, clamped and new caps placed with heparin 1:1000 1.9 mL (1900 units) per lumen    Meds  given: EPO, Hectoral, Venofer   Complications: None      Person educated: Patient. Knowledge base moderate. Barriers to learning: none. Educated on procedure via verbal mode. Patient verbalized understanding.   ICEBOAT? Timeout performed pre-treatment  I: Patient was identified using 2 identifiers  C:  Consent Signed Yes  E: Equipment preventative maintenance is current and dialysis delivery system OK to use  B:   Latest Reference Range & Units 12/10/22 20:22   Hep B Surface Agn Nonreactive  Nonreactive   Hepatitis B Surface Antibody Instrument Value <8.00 m[IU]/mL 0.00   Hepatitis B Surface Antibody  Nonreactive   O: Dialysis orders  present and complete prior to treatment  A: Vascular access verified and assessed prior to treatment  T: Treatment was performed at a clinically appropriate time  ?: Patient was allowed to ask questions and address concerns prior to treatment  See Adult Hemodialysis flowsheet in EPIC for further details and post assessment.  Machine water alarm in place and functioning. Transducer pods intact and checked every 15min.   Pt assisted with repositioning throughout dialysis treatment.  Chlorine/Chloramine water system checked every 4 hours.    Post treatment report given to PETER Young regarding 2L of fluid removed, last BP    Karina Nixon RN

## 2022-12-28 NOTE — PROGRESS NOTES
Pt remains on full vent support this shift and tolerating well. Tubefeed on hold previous shift with instructions to restart at low rate and turn to goal as tolerated. Pt had no residual or nausea or vomiting and active bowel sounds. Abdomen very rounded but pt has history of this with ascites. Blood sugar lower at 79, D10 ran for one hour and tubefeed to goal. Blood sugar recheck normal and D10 stopped. Pt appears to be resting well this shift with no signs of distress.

## 2022-12-28 NOTE — PROGRESS NOTES
SLP PMSV Communication Evaluation  12/28/22 8067   Appointment Info   Signing Clinician's Name / Credentials (SLP) Jie Marcus MA CCC SLP   General Information   Onset of Illness/Injury or Date of Surgery 12/16/22   Referring Physician Dr. Fernandes   Patient/Family Therapy Goal Statement (SLP) To talk, eat and drink   Pertinent History of Current Problem Per notes: 57 year old male with paroxysmal A. fib, Liver transplant (2016), CHRISTIAN on BiPAP, h/o CVA and hypertension. Patient  s/p Trach/PEG following acu had Rt sided Chest tube placed for hydroptx on 12/12/22 and  transferred to [place] on 12/14/22 for CIP.  Pt  noted to have bloody stool on 12/15 and 12/16 and had left Ptx which required left sided chest tube leading to ~900ml of bloody output. Patient with PEA arrest 12/20/22 and then seizure while on HD.  Overall stable and getting close to being ready for transfer back to [facility].   General Observations Pt was alert and cooperative.  Pt was on trach dome and had just been switched back to the ventilator prior to the session. PMSV re-evaluation was completed with cuff deflation and pressure support setting.  RT was present to deflate the cuff and suction pt before and after cuff deflation.  Pt reports some leak speech recently during the day.  Pt has not been using a PMSV since readmission due to medical and ventilator needs.     Type of Evaluation   Type of Evaluation Artificial Airway (Speaking Valve)   Tracheostomy Assessment (Speaking Valve)   Type, Tracheostomy Tube Ivon #6 XLT   Cuff, Tracheostomy Tube cuffed, inflated   Date of Tracheostomy 11/29/22   Respiratory Status (Speaking Valve)   Oxygen Supply CPAP   Oral/Tracheal Secretions (Speaking Valve)   Oral Secretions (Speaking Valve Assessment) minimal secretions   Tracheal Secretions (Speaking Valve Assessment)   (Mild-moderate amount after cuff deflation, no secretion issues after trach suctioning performed)   Speaking Valve Trials (Speaking  Valve)   Cuff Inflated at Onset of Evaluation Yes   Orders received to deflate cuff for PMSV trial Yes   Oxygen saturation before cuff deflation 95 %   Respiratory rate before cuff deflation 25 Per Minute   Set-up/Cuff Deflation (Speaking Valve Trial) cuff deflated, total  (slight voicing produced after cuff deflation)   Oxygen saturation after cuff deflation 95 %   Respiratory rate after cuff deflation 25 Per Minute   Secretions/Suction, Cuff Deflation (Speaking Valve) tracheal suctioning prior to trial;tracheal suctioning post cuff deflation   Speaking Valve placed with inline adapter   Oxygen saturation with PMSV placement 95 %   Respiratory Rate with PMSV placement 25 Per Minute   Breath Support (Speaking Valve Trial) coordinates speech with breath support   Voice Production (Speaking Valve Trial) voicing achieved  (Mild-moderate decreased intensity)   Cough Production (Speaking Valve Trial) good   Secretions During Valve Use (Speaking Valve Trial) secretions managed well during valve use   Outcome of Trial (Speaking Valve) tolerance is good;cuff reinflated;trials discontinued   Total amount of time with PMSV placement: 5   Recommendations (Speaking Valve Trials) speaking valve use recommended  (with SLP and RT supervision only at this time)   Leak Speech leak speech attempted/achieved   Clinical Impression   Criteria for Skilled Therapeutic Interventions Met (SLP Eval) Yes, treatment indicated   SLP Diagnosis Aphonia due to cuffed trach tube   Risks & Benefits of therapy have been explained evaluation/treatment results reviewed;care plan/treatment goals reviewed;risks/benefits reviewed;current/potential barriers reviewed;participants voiced agreement with care plan;participants included;patient;spouse/significant other   Clinical Impression Comments Pt presents with aphonia due to cuffed trach tube.  Patient was seen for a [place] Speaking Valve (PMSV) re-evaluation this am.  Pt tolerated PMSV placement  while on pressure support for 5 minutes with stable respiratory status and voicing achieved.  Good secretion management was noted during the trial.  Recommend continued PMSV trials with SLP and RT supervision only at this time.  If pt tolerates PMSV placement for 30+ minutes with SLP x 2, pt may be appropriate to initiate a SLP swallow evaluation.  [facility]   SLP Total Evaluation Time   Eval: oral/pharyngeal swallow function, clinical swallow Minutes (08741) 15   Speech, Language, Voice Communication&/or Auditory Processing   Treatment of Speech, Language, Voice Communication&/or Auditory Processing Minutes (21958) 10   Symptoms Noted During/After Treatment None   Treatment Detail/Skilled Intervention PMSV: Educated pt, family, RT regarding PMSV use, cuff deflation, plan for SLP and RT supervision only at this time, need for PMSV use for swallow evaluations.  Pt and family verbalized understanding. Pt was able to produce intelligible speech with min-mod cues for repetition at the phrase to sentence level in Tx.   SLP Discharge Planning   SLP Plan PMSV trials, speech strategies   SLP Discharge Recommendation Transitional Care Facility   SLP Rationale for DC Rec Communication and swallow function are below baseline, [facility] with [facility]LP and RT supervision at this time   SLP Brief overview of current status  PMSV tolerated well for brief trial during re-evaluation today. Rec: PMSV with SLP and RT supervision only at this time,   Total Session Time   Total Session Time (sum of timed and untimed services) 25

## 2022-12-29 ENCOUNTER — HOSPITAL ENCOUNTER (INPATIENT)
Facility: CLINIC | Age: 57
LOS: 23 days | Discharge: ANOTHER HOSPITAL | DRG: 207 | End: 2023-01-21
Attending: HOSPITALIST | Admitting: HOSPITALIST
Payer: COMMERCIAL

## 2022-12-29 ENCOUNTER — APPOINTMENT (OUTPATIENT)
Dept: SPEECH THERAPY | Facility: CLINIC | Age: 57
DRG: 207 | End: 2022-12-29
Attending: SURGERY
Payer: COMMERCIAL

## 2022-12-29 PROBLEM — Z99.2: Status: ACTIVE | Noted: 2022-12-29

## 2022-12-29 PROBLEM — Z86.74 HISTORY OF SUDDEN CARDIAC ARREST: Status: ACTIVE | Noted: 2022-12-29

## 2022-12-29 PROBLEM — J96.21 ACUTE ON CHRONIC RESPIRATORY FAILURE WITH HYPOXIA (H): Status: ACTIVE | Noted: 2022-12-29

## 2022-12-29 LAB
ALBUMIN SERPL-MCNC: 1.8 G/DL (ref 3.4–5)
ANION GAP SERPL CALCULATED.3IONS-SCNC: 3 MMOL/L (ref 3–14)
BACTERIA FLD CULT: ABNORMAL
BUN SERPL-MCNC: 46 MG/DL (ref 7–30)
CALCIUM SERPL-MCNC: 7.8 MG/DL (ref 8.5–10.1)
CHLORIDE BLD-SCNC: 103 MMOL/L (ref 94–109)
CO2 SERPL-SCNC: 31 MMOL/L (ref 20–32)
CREAT SERPL-MCNC: 2.4 MG/DL (ref 0.66–1.25)
GFR SERPL CREATININE-BSD FRML MDRD: 31 ML/MIN/1.73M2
GLUCOSE BLD-MCNC: 160 MG/DL (ref 70–99)
GLUCOSE BLDC GLUCOMTR-MCNC: 126 MG/DL (ref 70–99)
GLUCOSE BLDC GLUCOMTR-MCNC: 132 MG/DL (ref 70–99)
GLUCOSE BLDC GLUCOMTR-MCNC: 159 MG/DL (ref 70–99)
GLUCOSE BLDC GLUCOMTR-MCNC: 166 MG/DL (ref 70–99)
GLUCOSE BLDC GLUCOMTR-MCNC: 166 MG/DL (ref 70–99)
GLUCOSE BLDC GLUCOMTR-MCNC: 172 MG/DL (ref 70–99)
GLUCOSE BLDC GLUCOMTR-MCNC: 174 MG/DL (ref 70–99)
PHOSPHATE SERPL-MCNC: 2.4 MG/DL (ref 2.5–4.5)
POTASSIUM BLD-SCNC: 3.4 MMOL/L (ref 3.4–5.3)
SODIUM SERPL-SCNC: 137 MMOL/L (ref 133–144)

## 2022-12-29 PROCEDURE — 999N000253 HC STATISTIC WEANING TRIALS

## 2022-12-29 PROCEDURE — 250N000012 HC RX MED GY IP 250 OP 636 PS 637: Performed by: INTERNAL MEDICINE

## 2022-12-29 PROCEDURE — 999N000157 HC STATISTIC RCP TIME EA 10 MIN

## 2022-12-29 PROCEDURE — 94640 AIRWAY INHALATION TREATMENT: CPT | Mod: 76

## 2022-12-29 PROCEDURE — 250N000013 HC RX MED GY IP 250 OP 250 PS 637: Performed by: PHYSICIAN ASSISTANT

## 2022-12-29 PROCEDURE — 5A1955Z RESPIRATORY VENTILATION, GREATER THAN 96 CONSECUTIVE HOURS: ICD-10-PCS | Performed by: NURSE PRACTITIONER

## 2022-12-29 PROCEDURE — 92609 USE OF SPEECH DEVICE SERVICE: CPT | Mod: GN | Performed by: SPEECH-LANGUAGE PATHOLOGIST

## 2022-12-29 PROCEDURE — 94640 AIRWAY INHALATION TREATMENT: CPT

## 2022-12-29 PROCEDURE — 120N000017 HC R&B RESPIRATORY CARE

## 2022-12-29 PROCEDURE — 250N000013 HC RX MED GY IP 250 OP 250 PS 637: Performed by: SURGERY

## 2022-12-29 PROCEDURE — 99223 1ST HOSP IP/OBS HIGH 75: CPT | Performed by: HOSPITALIST

## 2022-12-29 PROCEDURE — 99292 CRITICAL CARE ADDL 30 MIN: CPT | Performed by: INTERNAL MEDICINE

## 2022-12-29 PROCEDURE — 250N000013 HC RX MED GY IP 250 OP 250 PS 637: Performed by: INTERNAL MEDICINE

## 2022-12-29 PROCEDURE — 250N000011 HC RX IP 250 OP 636: Performed by: HOSPITALIST

## 2022-12-29 PROCEDURE — 80069 RENAL FUNCTION PANEL: CPT | Performed by: STUDENT IN AN ORGANIZED HEALTH CARE EDUCATION/TRAINING PROGRAM

## 2022-12-29 PROCEDURE — 250N000009 HC RX 250: Performed by: NURSE PRACTITIONER

## 2022-12-29 PROCEDURE — 250N000009 HC RX 250: Performed by: INTERNAL MEDICINE

## 2022-12-29 PROCEDURE — 999N000009 HC STATISTIC AIRWAY CARE

## 2022-12-29 PROCEDURE — 94002 VENT MGMT INPAT INIT DAY: CPT | Mod: ZZRT

## 2022-12-29 PROCEDURE — 94003 VENT MGMT INPAT SUBQ DAY: CPT

## 2022-12-29 PROCEDURE — 250N000009 HC RX 250: Performed by: HOSPITALIST

## 2022-12-29 PROCEDURE — 258N000003 HC RX IP 258 OP 636: Performed by: HOSPITALIST

## 2022-12-29 PROCEDURE — 99232 SBSQ HOSP IP/OBS MODERATE 35: CPT | Performed by: INTERNAL MEDICINE

## 2022-12-29 PROCEDURE — 94002 VENT MGMT INPAT INIT DAY: CPT | Performed by: NURSE PRACTITIONER

## 2022-12-29 PROCEDURE — 250N000011 HC RX IP 250 OP 636: Performed by: INTERNAL MEDICINE

## 2022-12-29 PROCEDURE — 99291 CRITICAL CARE FIRST HOUR: CPT | Performed by: INTERNAL MEDICINE

## 2022-12-29 PROCEDURE — 250N000013 HC RX MED GY IP 250 OP 250 PS 637: Performed by: HOSPITALIST

## 2022-12-29 PROCEDURE — 250N000012 HC RX MED GY IP 250 OP 636 PS 637: Performed by: HOSPITALIST

## 2022-12-29 RX ORDER — ACETAMINOPHEN 650 MG/1
650 SUPPOSITORY RECTAL EVERY 8 HOURS
Status: DISCONTINUED | OUTPATIENT
Start: 2022-12-29 | End: 2022-12-29 | Stop reason: HOSPADM

## 2022-12-29 RX ORDER — ACETAMINOPHEN 325 MG/1
975 TABLET ORAL EVERY 8 HOURS
Status: DISCONTINUED | OUTPATIENT
Start: 2022-12-29 | End: 2022-12-29 | Stop reason: HOSPADM

## 2022-12-29 RX ORDER — ALBUTEROL SULFATE 0.83 MG/ML
2.5 SOLUTION RESPIRATORY (INHALATION)
Status: CANCELLED | OUTPATIENT
Start: 2022-12-29

## 2022-12-29 RX ORDER — ACETAMINOPHEN 650 MG/1
650 SUPPOSITORY RECTAL EVERY 8 HOURS
Status: CANCELLED | OUTPATIENT
Start: 2022-12-29

## 2022-12-29 RX ORDER — ACETYLCYSTEINE 200 MG/ML
2 SOLUTION ORAL; RESPIRATORY (INHALATION) 2 TIMES DAILY
Status: DISCONTINUED | OUTPATIENT
Start: 2022-12-29 | End: 2023-01-03

## 2022-12-29 RX ORDER — ACETAMINOPHEN 325 MG/1
975 TABLET ORAL EVERY 8 HOURS SCHEDULED
Status: DISCONTINUED | OUTPATIENT
Start: 2022-12-29 | End: 2023-01-21 | Stop reason: HOSPADM

## 2022-12-29 RX ORDER — LEVETIRACETAM 100 MG/ML
1000 SOLUTION ORAL EVERY 24 HOURS
Status: DISCONTINUED | OUTPATIENT
Start: 2022-12-30 | End: 2023-01-21 | Stop reason: HOSPADM

## 2022-12-29 RX ORDER — NICOTINE POLACRILEX 4 MG
15-30 LOZENGE BUCCAL
Status: DISCONTINUED | OUTPATIENT
Start: 2022-12-29 | End: 2023-01-21 | Stop reason: HOSPADM

## 2022-12-29 RX ORDER — NYSTATIN 100000/ML
500000 SUSPENSION, ORAL (FINAL DOSE FORM) ORAL 4 TIMES DAILY
Status: DISCONTINUED | OUTPATIENT
Start: 2022-12-29 | End: 2023-01-21 | Stop reason: HOSPADM

## 2022-12-29 RX ORDER — ACETAMINOPHEN 650 MG/20.3ML
650 LIQUID ORAL EVERY 4 HOURS PRN
Status: DISCONTINUED | OUTPATIENT
Start: 2022-12-29 | End: 2023-01-21 | Stop reason: HOSPADM

## 2022-12-29 RX ORDER — LACTULOSE 10 G/15ML
20 SOLUTION ORAL 3 TIMES DAILY
Status: CANCELLED | OUTPATIENT
Start: 2022-12-29

## 2022-12-29 RX ORDER — FOLIC ACID 5 MG/ML
1 INJECTION, SOLUTION INTRAMUSCULAR; INTRAVENOUS; SUBCUTANEOUS DAILY
Status: CANCELLED | OUTPATIENT
Start: 2022-12-29

## 2022-12-29 RX ORDER — BISACODYL 10 MG
10 SUPPOSITORY, RECTAL RECTAL DAILY PRN
Status: DISCONTINUED | OUTPATIENT
Start: 2022-12-29 | End: 2023-01-21 | Stop reason: HOSPADM

## 2022-12-29 RX ORDER — NICOTINE POLACRILEX 4 MG
15-30 LOZENGE BUCCAL
Status: CANCELLED | OUTPATIENT
Start: 2022-12-29

## 2022-12-29 RX ORDER — ACETAMINOPHEN 325 MG/1
975 TABLET ORAL EVERY 8 HOURS
Status: CANCELLED | OUTPATIENT
Start: 2022-12-29

## 2022-12-29 RX ORDER — MIDODRINE HYDROCHLORIDE 5 MG/1
10 TABLET ORAL
Status: DISCONTINUED | OUTPATIENT
Start: 2022-12-29 | End: 2023-01-04

## 2022-12-29 RX ORDER — LACOSAMIDE 10 MG/ML
100 SOLUTION ORAL 2 TIMES DAILY
Status: DISCONTINUED | OUTPATIENT
Start: 2022-12-29 | End: 2023-01-21 | Stop reason: HOSPADM

## 2022-12-29 RX ORDER — DEXTROSE MONOHYDRATE 100 MG/ML
INJECTION, SOLUTION INTRAVENOUS CONTINUOUS PRN
Status: DISCONTINUED | OUTPATIENT
Start: 2022-12-29 | End: 2023-01-21 | Stop reason: HOSPADM

## 2022-12-29 RX ORDER — NICOTINE POLACRILEX 4 MG
15-30 LOZENGE BUCCAL
Status: DISCONTINUED | OUTPATIENT
Start: 2022-12-29 | End: 2023-01-01

## 2022-12-29 RX ORDER — ACETYLCYSTEINE 200 MG/ML
2 SOLUTION ORAL; RESPIRATORY (INHALATION) 2 TIMES DAILY
Status: CANCELLED | OUTPATIENT
Start: 2022-12-29

## 2022-12-29 RX ORDER — IPRATROPIUM BROMIDE AND ALBUTEROL SULFATE 2.5; .5 MG/3ML; MG/3ML
3 SOLUTION RESPIRATORY (INHALATION)
Status: DISCONTINUED | OUTPATIENT
Start: 2022-12-29 | End: 2022-12-29

## 2022-12-29 RX ORDER — DEXTROSE MONOHYDRATE 25 G/50ML
25-50 INJECTION, SOLUTION INTRAVENOUS
Status: DISCONTINUED | OUTPATIENT
Start: 2022-12-29 | End: 2023-01-01

## 2022-12-29 RX ORDER — ALBUTEROL SULFATE 0.83 MG/ML
2.5 SOLUTION RESPIRATORY (INHALATION)
Status: DISCONTINUED | OUTPATIENT
Start: 2022-12-29 | End: 2023-01-21 | Stop reason: HOSPADM

## 2022-12-29 RX ORDER — DEXTROSE MONOHYDRATE 25 G/50ML
25-50 INJECTION, SOLUTION INTRAVENOUS
Status: DISCONTINUED | OUTPATIENT
Start: 2022-12-29 | End: 2023-01-21 | Stop reason: HOSPADM

## 2022-12-29 RX ORDER — FLUORIDE TOOTHPASTE
10 TOOTHPASTE DENTAL 4 TIMES DAILY PRN
Status: DISCONTINUED | OUTPATIENT
Start: 2022-12-29 | End: 2023-01-21 | Stop reason: HOSPADM

## 2022-12-29 RX ORDER — ONDANSETRON 4 MG/1
4 TABLET, ORALLY DISINTEGRATING ORAL EVERY 6 HOURS PRN
Status: DISCONTINUED | OUTPATIENT
Start: 2022-12-29 | End: 2023-01-21 | Stop reason: HOSPADM

## 2022-12-29 RX ORDER — IPRATROPIUM BROMIDE AND ALBUTEROL SULFATE 2.5; .5 MG/3ML; MG/3ML
3 SOLUTION RESPIRATORY (INHALATION)
Status: DISCONTINUED | OUTPATIENT
Start: 2022-12-29 | End: 2023-01-03

## 2022-12-29 RX ORDER — PREDNISONE 5 MG/1
5 TABLET ORAL DAILY
Status: DISCONTINUED | OUTPATIENT
Start: 2022-12-29 | End: 2022-12-29

## 2022-12-29 RX ORDER — CARBOXYMETHYLCELLULOSE SODIUM 5 MG/ML
1 SOLUTION/ DROPS OPHTHALMIC 3 TIMES DAILY
Status: DISCONTINUED | OUTPATIENT
Start: 2022-12-29 | End: 2023-01-21 | Stop reason: HOSPADM

## 2022-12-29 RX ORDER — B COMPLEX C NO.10/FOLIC ACID 900MCG/5ML
5 LIQUID (ML) ORAL DAILY
Status: DISCONTINUED | OUTPATIENT
Start: 2022-12-30 | End: 2023-01-09 | Stop reason: RX

## 2022-12-29 RX ORDER — QUETIAPINE FUMARATE 50 MG/1
50 TABLET, FILM COATED ORAL AT BEDTIME
Status: DISCONTINUED | OUTPATIENT
Start: 2022-12-29 | End: 2023-01-05

## 2022-12-29 RX ORDER — ACETAMINOPHEN 325 MG/1
650 TABLET ORAL EVERY 4 HOURS PRN
Status: DISCONTINUED | OUTPATIENT
Start: 2022-12-29 | End: 2023-01-21 | Stop reason: HOSPADM

## 2022-12-29 RX ORDER — AMPICILLIN AND SULBACTAM 2; 1 G/1; G/1
3 INJECTION, POWDER, FOR SOLUTION INTRAMUSCULAR; INTRAVENOUS EVERY 24 HOURS
Status: DISCONTINUED | OUTPATIENT
Start: 2022-12-30 | End: 2022-12-30

## 2022-12-29 RX ORDER — CARBOXYMETHYLCELLULOSE SODIUM 5 MG/ML
1 SOLUTION/ DROPS OPHTHALMIC 3 TIMES DAILY
Status: CANCELLED | OUTPATIENT
Start: 2022-12-29

## 2022-12-29 RX ORDER — FAMOTIDINE 20 MG/1
20 TABLET, FILM COATED ORAL 2 TIMES DAILY PRN
Status: CANCELLED | OUTPATIENT
Start: 2022-12-29

## 2022-12-29 RX ORDER — DOCUSATE SODIUM 100 MG/1
100 CAPSULE, LIQUID FILLED ORAL 2 TIMES DAILY PRN
Status: DISCONTINUED | OUTPATIENT
Start: 2022-12-29 | End: 2023-01-21 | Stop reason: HOSPADM

## 2022-12-29 RX ORDER — CHLORHEXIDINE GLUCONATE ORAL RINSE 1.2 MG/ML
15 SOLUTION DENTAL 2 TIMES DAILY
Status: DISCONTINUED | OUTPATIENT
Start: 2022-12-29 | End: 2023-01-21 | Stop reason: HOSPADM

## 2022-12-29 RX ORDER — GUAIFENESIN 600 MG/1
15 TABLET, EXTENDED RELEASE ORAL DAILY
Status: DISCONTINUED | OUTPATIENT
Start: 2022-12-29 | End: 2022-12-29

## 2022-12-29 RX ORDER — LACTULOSE 10 G/15ML
20 SOLUTION ORAL 3 TIMES DAILY
Status: DISCONTINUED | OUTPATIENT
Start: 2022-12-29 | End: 2023-01-02

## 2022-12-29 RX ORDER — IPRATROPIUM BROMIDE AND ALBUTEROL SULFATE 2.5; .5 MG/3ML; MG/3ML
3 SOLUTION RESPIRATORY (INHALATION) 4 TIMES DAILY
Status: CANCELLED | OUTPATIENT
Start: 2022-12-29

## 2022-12-29 RX ORDER — FOLIC ACID 1 MG/1
1 TABLET ORAL DAILY
Status: DISCONTINUED | OUTPATIENT
Start: 2022-12-30 | End: 2023-01-21 | Stop reason: HOSPADM

## 2022-12-29 RX ORDER — LANOLIN ALCOHOL/MO/W.PET/CERES
6 CREAM (GRAM) TOPICAL
Status: DISCONTINUED | OUTPATIENT
Start: 2022-12-29 | End: 2023-01-02

## 2022-12-29 RX ORDER — CALCIUM CARBONATE 500 MG/1
500-1000 TABLET, CHEWABLE ORAL 4 TIMES DAILY PRN
Status: CANCELLED | OUTPATIENT
Start: 2022-12-29

## 2022-12-29 RX ORDER — MINERAL OIL/HYDROPHIL PETROLAT
OINTMENT (GRAM) TOPICAL 2 TIMES DAILY PRN
Status: DISCONTINUED | OUTPATIENT
Start: 2022-12-29 | End: 2023-01-21 | Stop reason: HOSPADM

## 2022-12-29 RX ORDER — PREDNISONE 5 MG/ML
2.5 SOLUTION ORAL DAILY
Status: COMPLETED | OUTPATIENT
Start: 2022-12-30 | End: 2022-12-31

## 2022-12-29 RX ADMIN — LACTULOSE 20 G: 20 SOLUTION ORAL at 16:45

## 2022-12-29 RX ADMIN — IPRATROPIUM BROMIDE AND ALBUTEROL SULFATE 3 ML: .5; 3 SOLUTION RESPIRATORY (INHALATION) at 00:00

## 2022-12-29 RX ADMIN — CHLORHEXIDINE GLUCONATE 0.12% ORAL RINSE 15 ML: 1.2 LIQUID ORAL at 21:13

## 2022-12-29 RX ADMIN — INSULIN ASPART 1 UNITS: 100 INJECTION, SOLUTION INTRAVENOUS; SUBCUTANEOUS at 17:02

## 2022-12-29 RX ADMIN — AMPICILLIN SODIUM AND SULBACTAM SODIUM 3 G: 2; 1 INJECTION, POWDER, FOR SOLUTION INTRAMUSCULAR; INTRAVENOUS at 06:23

## 2022-12-29 RX ADMIN — INSULIN ASPART 1 UNITS: 100 INJECTION, SOLUTION INTRAVENOUS; SUBCUTANEOUS at 12:01

## 2022-12-29 RX ADMIN — Medication 1 DROP: at 21:10

## 2022-12-29 RX ADMIN — ACETAMINOPHEN 975 MG: 325 TABLET, FILM COATED ORAL at 21:54

## 2022-12-29 RX ADMIN — Medication 40 MG: at 21:12

## 2022-12-29 RX ADMIN — LACTULOSE 20 G: 20 SOLUTION ORAL at 21:10

## 2022-12-29 RX ADMIN — LACOSAMIDE 100 MG: 10 SOLUTION ORAL at 21:10

## 2022-12-29 RX ADMIN — MIDODRINE HYDROCHLORIDE 10 MG: 5 TABLET ORAL at 12:03

## 2022-12-29 RX ADMIN — NYSTATIN 500000 UNITS: 100000 SUSPENSION ORAL at 18:12

## 2022-12-29 RX ADMIN — QUETIAPINE FUMARATE 50 MG: 50 TABLET ORAL at 21:12

## 2022-12-29 RX ADMIN — APIXABAN 5 MG: 2.5 TABLET, FILM COATED ORAL at 21:09

## 2022-12-29 RX ADMIN — MICAFUNGIN SODIUM 100 MG: 100 INJECTION, POWDER, LYOPHILIZED, FOR SOLUTION INTRAVENOUS at 18:24

## 2022-12-29 RX ADMIN — INSULIN GLARGINE 20 UNITS: 100 INJECTION, SOLUTION SUBCUTANEOUS at 09:58

## 2022-12-29 RX ADMIN — TACROLIMUS 1 MG: 5 CAPSULE ORAL at 09:32

## 2022-12-29 RX ADMIN — RIFAXIMIN 550 MG: 550 TABLET ORAL at 21:13

## 2022-12-29 RX ADMIN — INSULIN ASPART 1 UNITS: 100 INJECTION, SOLUTION INTRAVENOUS; SUBCUTANEOUS at 20:07

## 2022-12-29 RX ADMIN — RIFAXIMIN 550 MG: 550 TABLET ORAL at 09:32

## 2022-12-29 RX ADMIN — Medication 5 ML: at 09:33

## 2022-12-29 RX ADMIN — APIXABAN 5 MG: 5 TABLET, FILM COATED ORAL at 09:32

## 2022-12-29 RX ADMIN — MIDODRINE HYDROCHLORIDE 10 MG: 5 TABLET ORAL at 18:11

## 2022-12-29 RX ADMIN — IPRATROPIUM BROMIDE AND ALBUTEROL SULFATE 3 ML: 2.5; .5 SOLUTION RESPIRATORY (INHALATION) at 19:04

## 2022-12-29 RX ADMIN — CHLORHEXIDINE GLUCONATE 0.12% ORAL RINSE 15 ML: 1.2 LIQUID ORAL at 07:48

## 2022-12-29 RX ADMIN — TACROLIMUS 1 MG: 5 CAPSULE ORAL at 18:13

## 2022-12-29 RX ADMIN — ACETAMINOPHEN 975 MG: 325 TABLET, FILM COATED ORAL at 16:43

## 2022-12-29 RX ADMIN — LEVETIRACETAM 1000 MG: 100 SOLUTION ORAL at 12:07

## 2022-12-29 RX ADMIN — NYSTATIN 500000 UNITS: 100000 SUSPENSION ORAL at 21:11

## 2022-12-29 RX ADMIN — INSULIN ASPART 1 UNITS: 100 INJECTION, SOLUTION INTRAVENOUS; SUBCUTANEOUS at 08:02

## 2022-12-29 RX ADMIN — NYSTATIN 500000 UNITS: 100000 SUSPENSION ORAL at 09:32

## 2022-12-29 RX ADMIN — Medication 1 DROP: at 09:32

## 2022-12-29 RX ADMIN — Medication 1 DROP: at 16:45

## 2022-12-29 RX ADMIN — OXYCODONE HYDROCHLORIDE 2.5 MG: 5 TABLET ORAL at 10:54

## 2022-12-29 RX ADMIN — FOLIC ACID 1 MG: 1 TABLET ORAL at 09:32

## 2022-12-29 RX ADMIN — NYSTATIN 500000 UNITS: 100000 SUSPENSION ORAL at 12:07

## 2022-12-29 RX ADMIN — IPRATROPIUM BROMIDE AND ALBUTEROL SULFATE 3 ML: .5; 3 SOLUTION RESPIRATORY (INHALATION) at 08:16

## 2022-12-29 RX ADMIN — Medication 40 MG: at 09:32

## 2022-12-29 RX ADMIN — PREDNISONE 5 MG: 5 TABLET ORAL at 09:32

## 2022-12-29 RX ADMIN — ACETYLCYSTEINE 2 ML: 200 SOLUTION ORAL; RESPIRATORY (INHALATION) at 19:03

## 2022-12-29 RX ADMIN — MIDODRINE HYDROCHLORIDE 10 MG: 5 TABLET ORAL at 09:32

## 2022-12-29 RX ADMIN — LACTULOSE 20 G: 20 SOLUTION ORAL at 09:32

## 2022-12-29 RX ADMIN — IPRATROPIUM BROMIDE AND ALBUTEROL SULFATE 3 ML: .5; 3 SOLUTION RESPIRATORY (INHALATION) at 12:36

## 2022-12-29 RX ADMIN — MULTIVITAMIN 15 ML: LIQUID ORAL at 09:32

## 2022-12-29 RX ADMIN — LACOSAMIDE 100 MG: 10 SOLUTION ORAL at 09:34

## 2022-12-29 ASSESSMENT — ACTIVITIES OF DAILY LIVING (ADL)
ADLS_ACUITY_SCORE: 47
ADLS_ACUITY_SCORE: 62
EATING: 2-->COMPLETELY DEPENDENT
EATING/SWALLOWING: SWALLOWING SOLID FOOD;SWALLOWING LIQUIDS
FALL_HISTORY_WITHIN_LAST_SIX_MONTHS: NO
DIFFICULTY_EATING/SWALLOWING: YES
SWALLOWING: 2-->DIFFICULTY SWALLOWING LIQUIDS/FOODS
DRESS: 2-->COMPLETELY DEPENDENT (NOT DEVELOPMENTALLY APPROPRIATE)
ADLS_ACUITY_SCORE: 47
ADLS_ACUITY_SCORE: 37
WALKING_OR_CLIMBING_STAIRS: AMBULATION DIFFICULTY, REQUIRES EQUIPMENT;AMBULATION DIFFICULTY, ASSISTANCE 1 PERSON
CONCENTRATING,_REMEMBERING_OR_MAKING_DECISIONS_DIFFICULTY: NO
DOING_ERRANDS_INDEPENDENTLY_DIFFICULTY: YES
TOILETING_ISSUES: NO
FALL_HISTORY_WITHIN_LAST_SIX_MONTHS: NO
DRESSING/BATHING_DIFFICULTY: YES
WALKING_OR_CLIMBING_STAIRS_DIFFICULTY: YES
CONCENTRATING,_REMEMBERING_OR_MAKING_DECISIONS_DIFFICULTY: NO
SWALLOWING: 2-->DIFFICULTY SWALLOWING LIQUIDS/FOODS
EATING: 2-->COMPLETELY DEPENDENT (NOT DEVELOPMENTALLY APPROPRIATE)
DOING_ERRANDS_INDEPENDENTLY_DIFFICULTY: YES
WEAR_GLASSES_OR_BLIND: NO
DRESSING/BATHING: BATHING DIFFICULTY, DEPENDENT;DRESSING DIFFICULTY, DEPENDENT
ADLS_ACUITY_SCORE: 48
DRESS: 2-->COMPLETELY DEPENDENT
TRANSFERRING: 2-->COMPLETELY DEPENDENT (NOT DEVELOPMENTALLY APPROPRIATE)
WALKING_OR_CLIMBING_STAIRS: AMBULATION DIFFICULTY, DEPENDENT;STAIR CLIMBING DIFFICULTY, DEPENDENT;TRANSFERRING DIFFICULTY, DEPENDENT
BATHING: 2-->COMPLETELY DEPENDENT (NOT DEVELOPMENTALLY APPROPRIATE)
DRESS: 2-->COMPLETELY DEPENDENT (NOT DEVELOPMENTALLY APPROPRIATE)
ADLS_ACUITY_SCORE: 62
DIFFICULTY_EATING/SWALLOWING: YES
EQUIPMENT_CURRENTLY_USED_AT_HOME: OTHER (SEE COMMENTS)
TRANSFERRING: 2-->COMPLETELY DEPENDENT
CHANGE_IN_FUNCTIONAL_STATUS_SINCE_ONSET_OF_CURRENT_ILLNESS/INJURY: YES
TOILETING_ISSUES: NO
DRESSING/BATHING: BATHING DIFFICULTY, DEPENDENT
DRESSING/BATHING_DIFFICULTY: YES
ADLS_ACUITY_SCORE: 50
ADLS_ACUITY_SCORE: 62
CHANGE_IN_FUNCTIONAL_STATUS_SINCE_ONSET_OF_CURRENT_ILLNESS/INJURY: YES
ADLS_ACUITY_SCORE: 47
EATING: 2-->COMPLETELY DEPENDENT
DRESS: 2-->COMPLETELY DEPENDENT
ADLS_ACUITY_SCORE: 50
SWALLOWING: 2-->DIFFICULTY SWALLOWING LIQUIDS/FOODS
SWALLOWING: 2-->DIFFICULTY SWALLOWING LIQUIDS/FOODS
TRANSFERRING: 2-->COMPLETELY DEPENDENT
BATHING: 2-->COMPLETELY DEPENDENT (NOT DEVELOPMENTALLY APPROPRIATE)
WEAR_GLASSES_OR_BLIND: NO
WALKING_OR_CLIMBING_STAIRS_DIFFICULTY: YES
EATING: 2-->COMPLETELY DEPENDENT (NOT DEVELOPMENTALLY APPROPRIATE)
ADLS_ACUITY_SCORE: 48
TRANSFERRING: 2-->COMPLETELY DEPENDENT (NOT DEVELOPMENTALLY APPROPRIATE)
ADLS_ACUITY_SCORE: 47

## 2022-12-29 NOTE — PROGRESS NOTES
Neuro: Alert, moves all extremities  CV: SR, normotensive  Respiratory: Trached, vent Fi02 30%, Rate 12, , Peep 5  GI/: Incontinent x 2, FCS, anuric  Skin: Petechiae, scabs, and bruising scattered. Wound care (see flow sheets and orders)  Activity: Assist x 2 with lift  Diet: TF 60ml/hr q4 free water flush 30ml  Drips: Saline locked   Other:   Plan: discharge to [facility] 12/29/22

## 2022-12-29 NOTE — PROGRESS NOTES
[facility] ICU RESPIRATORY NOTE           Date of Admission: 12/16/2022     Date of Intubation (most recent): chronic trach     Reason for Mechanical Ventilation: Respiratory failure      Number of Days on Mechanical Ventilation: 13     Met Criteria for Spontaneous Breathing Trial: Pt is on PS 10/5 through out the night      Significant event today :none      ABG Results: No lab results found in last 7 days.    Current Vent Settings:  Vent Mode: CPAP/PS  (Continuous positive airway pressure with Pressure Support)  FiO2 (%): 30 %  Resp Rate (Set): 12 breaths/min  Tidal Volume (Set, mL): 450 mL  PEEP (cm H2O): 5 cmH2O  Pressure Support (cm H2O): 10 cmH2O  Resp: 14        Skin Assessment : drainage , skin discoloration      Plan: Continue full ventilatory support and wean as tolerated.     Traci Kwong, RT

## 2022-12-29 NOTE — PROGRESS NOTES
[facility]    Blanco Osborne has been accepted for admission to [facility] today.    Ride: Noon (12pm)    RN to RN report: Please call Unit [address] at [phone] for nurse to nurse report before the patient discharges.     Accepting MD: Provider to provider report was completed yesterday; Dr. Fritz does not need another call today regarding this patient. Please contact Dr. Fritz via pager [phone] if questions should arise prior to this patient's discharge.      Documentation needed prior to discharge: Discharge summary and discharge/readmission orders.     NO Transfer packet needed.           Laura Coughlin, PT, DPT  [facility] Referral Specialist    [facility]  [address] [place] [address]  Office: [phone] Fax: [phone]     CONFIDENTIAL Protected under [place] Statute  145.61 et seq

## 2022-12-29 NOTE — PROGRESS NOTES
Care Management Follow Up    Length of Stay (days): 13    Expected Discharge Date: 12/29/2022     Concerns to be Addressed: discharge planning     Patient plan of care discussed at interdisciplinary rounds: Yes    Anticipated Discharge Disposition: LTACH     Anticipated Discharge Services:    Anticipated Discharge DME:      Patient/family educated on Medicare website which has current facility and service quality ratings:    Education Provided on the Discharge Plan:    Patient/Family in Agreement with the Plan: yes    Referrals Placed by CM/SW: Transportation  Private pay costs discussed: Not applicable    Additional Information:  Following for discharge back to [facility].  Pt is ready to go.  Talked with [facility]ia[facility] and they can accept today.   She will put nurse-nurse number in a note.  LTACH will call unit station to to MD-MD report.    [facility] ride set for Noon.     Care Management Discharge Note    Discharge Date: 12/29/2022       Discharge Disposition: LTACH    Discharge Services:      Discharge DME:      Discharge Transportation: health plan transportation    Private pay costs discussed: Not applicable    PAS Confirmation Code:    Patient/family educated on Medicare website which has current facility and service quality ratings:      Education Provided on the Discharge Plan:    Persons Notified of Discharge Plans: Pt/wife  Patient/Family in Agreement with the Plan: yes    Handoff Referral Completed: No    Additional Information:  [facility]    Wife updated.   Nurse/HUC aware and have paperwork for transfer.            Alethea Mclean RN

## 2022-12-29 NOTE — CONSULTS
Consultation - Pulmonary Medicine  Blanco Osborne,  1965, MRN [id number]    Acute Resp Failure   Code status:  Prior       Extended Emergency Contact Information  Primary Emergency Contact: Dylan Osborne   [facility]  Home Phone: [phone]  Mobile Phone: [phone]  Relation: Brother  Secondary Emergency Contact: Ofe Osborne  Home Phone: [phone]  Mobile Phone: [phone]  Relation: Spouse       Impressions:   57yoM with liver transplant(), recent prolonged hospitalization -12/15 for PEA cardiac arrest, Strep/Parainfluenza pneumonia with ARDS, MODS, right PTX requiring chest tube since removed.  Now on HD, s/p trach/PEG.  Discharged to LTAC and sent out the next day with new left PTX & GIB.  Recent hospitalization c/b GIB, PEA arrest and seizure on .  Left CT removed on .  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  Transferred back to LTAC .    Discussion of pertinent problems:  1. Acute hypoxic/hypercapnic respiratory failure: Initial event was parainfluenza and strep pneumo pneumonia.  Failed extubation x2 and tracheostomy performed last hospitalization.  Additional complications of bilateral pneumothoraces.  Most recently was a hydropneumothorax where fluid grew Candida.  B/l chest tubes are now out.    2. Tracheostomy in place: 6 Shiley xlt placed   3. Dysphagia s/p PEG tube  4.  Candida in left pleural fluid cultures: 4 weeks of antimicrobial currently on Micafungin.  If sensitive to fluconazole could de-escalate  5. LORETTA on MWF HD  6. 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 CHRISTIAN  8. lactobacillus peritonitis on unasyn.  Per ID ok to transition to oral Augmentin at discharge for a 4 week course.  ICU note states 2 weeks total?  9. Recent seizure after  hypoxic event on 12/20: Keppra and vimpat indefinitely   10. Multifocal acute ischemic strokes on MRI(12/20).  AC was on hold d/t GIB and left hemothorax, now resumed  11. Hx b/l pneumothoraces: right CT first placed 11/16, removed, then replaced by IR on 12/12, removed again 12/19.  Left chest tube placed 12/16, removed on 12/19  12. Hx mucus plugging of bronchi.  Bronch on 12/20 with minimal secretions      Recommendations and Plans:   1. Phase 1 weans.  PS day with AC night   2. Micafungin for candida isolation in left pleural cultures.  Follow sensitivities to see if can de-escalate to fluconazole.  Likely 4 week course based on imaging.  Susceptibility testing pending  3. Unasyn course per primary who can consider de-escalation to Augmentin for SBP - duration per primary and ID  4. Bronchial hygiene: Duonebs QID, mucomyst nebs BID.  Sounds like he's been off metanebs recently as I don't see any mention of this in his notes, will continue to hold given complicated hx b/l pneumothoraces   5. BDT to assess current aspiration risk and likely start PMV trials with SLP  6. Sounds like he was doing some trach dome at [facility].  If we can progress to this, will look at downsizing trach soon to 7 Bivona.              Chief Complaint Hypoxic respiratory failure      HPI   I have been asked by Dr. Fritz to see Blancoalberto Osborne in consultation for trach and ventilator management.    Blanco Osborne is a 57 year old year old [facility] for ongoing treatment of respiratory failure.    The referring facility is [facility].  Records from that facility reviewed to assist in historical details.  No family members present during my visit    57yoM with liver transplant(2016), recent prolonged hospitalization 11/12-12/15 for PEA cardiac arrest, Strep/Parainfluenza pneumonia with ARDS, MODS, now on HD, s/p trach/PEG, right PTX requiring chest tube since removed.  Discharged  to LTAC 12/15 and sent out the next day with new left PTX requiring chest tube with  ~900ml of bloody output initially(tube since removed), and concern for GIB.  Recent hospitalization c/b GIB, PEA arrest with subsequent seizure on 12/20, new multifocal strokes on MRI, hepatic encephalopathy on lactulose, ascites s/p paracentesis candida in pleural fluid cx, ascites cx growing lactobacillus and CSF panel positive for HHV6.  On antimicrobials outlined by ID(see their note for details).      12/27: trial of trach dome  12/28: tolerated PMV trial with SLP   Last night was on PS 10/5 thru the night     Today he present on full vent settings.  Appears quite comfortable on the vent, he's awake, mouthing words, following commands appropriately.  No acute complaints.  Denies dyspnea, pain, n/v, fever, chills.  His abdomen is quite large, he notes no acute change with this.         Medical History  [unfilled]  [facility] Social History  Reviewed, and he  reports that he has never smoked. He has never used smokeless tobacco. He reports that he does not currently use alcohol. He reports that he does not use drugs.    Smoking history:   History   Smoking Status     Never   Smokeless Tobacco     Never    Family History  Reviewed, and family history is not on file.   Allergies  No Known Allergies           Current Medications:          Review of Systems:  A 10-system review was obtained and is negative with the exception of the symptoms noted above. Physical Exam:  Temp:  [96.6  F (35.9  C)-99.5  F (37.5  C)] 99.5  F (37.5  C)  Pulse:  [] 115  Resp:  [10-27] 27  BP: ()/(62-94) 139/94  FiO2 (%):  [30 %] 30 %  SpO2:  [89 %-100 %] 97 %  [unfilled]  Ventilator settings: Vent Mode: CMV/AC  (Continuous Mandatory Ventilation/ Assist Control)  FiO2 (%): 30 %  Resp Rate (Set): 12 breaths/min  Tidal Volume (Set, mL): 450 mL  PEEP (cm H2O): 5 cmH2O  Pressure Support (cm H2O): 10 cmH2O  Resp: 27      EXAM:  Physical  Exam  Gen: no distress on full vent   HEENT: NT, trach midline/intact, peristomal skin very ecchymotic and fragile  CV: RRR  Resp: CTAB; non-labored   Abd: soft, mildly tender, large, BS hypoacitve, rectal tube in place  Skin: no visible rashes or lesions, skin very fragile with scattered ecchymosis   Ext: left pedal edema, abdominal edema   Neuro: PERRL, nonfocal exam       Pertinent Labs:  Lab Results: personally reviewed.   Recent Labs   Lab 12/27/22 0420   WBC 7.5   HGB 8.8*   HCT 29.4*   PLT 63*     Recent Labs   Lab 12/29/22  0440 12/28/22  0428 12/27/22  0420 12/24/22  0551 12/23/22  0401    139 139   < > 135   CO2 31 29 29   < > 26   BUN 46* 62* 52*   < > 48*   ALKPHOS  --   --   --   --  198*   ALT  --   --   --   --  17   AST  --   --   --   --  14    < > = values in this interval not displayed.        Pertinent Radiology:  Radiology results: images and reports personally viewed; radiology read below     XR CHEST PORT 1 VIEW   12/27/2022 3:48 PM      HISTORY: Shortness of breath, mucous plug?     COMPARISON: Chest radiograph 12/24/2022                                                                      IMPRESSION: Stable cardiomediastinal silhouette with unchanged  position of right upper extremity PICC and dialysis catheter.  Tracheostomy tube tip overlies the midthoracic trachea. Persistent  bilateral pleural effusions with associated bibasilar atelectasis. No  new airspace consolidation or lobar atelectasis. No pneumothorax.  Bones are unchanged. Percutaneous gastrostomy tube partially  Visualized.  --------------    CT CHEST PULMONARY EMBOLISM W CONTRAST 12/20/2022      FINDINGS:  ANGIOGRAM CHEST: No evidence of pulmonary embolism. Intimal calcified  plaque in the thoracic aorta. No evidence of dissection.     LUNGS AND PLEURA: Moderate to large right pleural and moderate left  pleural effusions are again noted with the left effusion slightly  increased in size from 12/17/2022. A right-sided  pleural drain has  been removed with a small amount of residual loculated air at the site  of the previous drain. Again seen is high density material within the  right pleural space consistent with a hemothorax. No active bleeding  is identified. A tiny amount of loculated air in the left pleural  space has improved. There are persistent opacities and atelectasis  with partial collapse of the lower lobes. This is slightly increased  on the left. Persistent opacity which is somewhat bandlike in the  right upper lobe suggestive of a hematoma. Benign calcified granulomas  in both lungs. No new infiltrates. Endotracheal tube in good position.                                                                   IMPRESSION:  1.  No evidence of acute pulmonary embolism.  2.  Since 12/17/2022 a moderate left pleural effusion has mildly  increased in size. A small amount of loculated air in the left pleural  space has decreased. Mild increase in associated compressive  atelectasis in the left lower lobe which is predominantly collapsed.  3.  Interval removal of a right pleural drain with a small amount of  residual air at its prior location. A moderate to large right  hemothorax is unchanged. No active bleeding is visualized. Unchanged  compressive atelectasis and partial collapse of the right lower lobe.  4.  Unchanged presumed parenchymal hematoma within the right upper  lobe. No active bleeding.  5.  Partially visualized cirrhotic liver and splenomegaly.  --------------------  CT ABDOMEN/PELVIS WITHOUT CONTRAST December 19, 2022                                                                      IMPRESSION:   1.  Moderate to large amount of ascites. Ascites increased from  previous.  2.  No bowel obstruction or significant stool burden demonstrated.  3.  Improved pleural effusions and associated compressive atelectasis  and/or infiltrate since comparison.   Other pertinent data:  Interpretation Summary     Normal left  ventricular wall thickness.  Left ventricular systolic function is normal.  The visual ejection fraction is estimated at 70-75%.  Left ventricular diastolic function is normal.  No regional wall motion abnormalities noted.  The right ventricle is normal in size and function.  A contrast injection (Bubble Study) was performed that was negative for flow  across the interatrial septum.  No significant valve disease.  Right ventricular systolic pressure could not be approximated due to  inadequate tricuspid regurgitation.  The inferior vena cava is normal.     No significant changes compared to previous study dated 11/13/2022.     Tracheostomy tube data:  Date of initial placement: 11/29  Current tube - type: Shiley xlt , size: 6       Extensive record review is performed.  Key information about patient is reviewed in detail.  The respiratory plan of care is discussed with RT.  This patient will be rounded on regularly while requiring mechanical ventilator support.  Please contact us with questions or concerns.    Total time spent on pt examination, coordination of care, review of records, labs & imaging was 70min   Henry Fenton CNP  Pulmonary Medicine  [facility]  Pager [phone]

## 2022-12-29 NOTE — PROGRESS NOTES
Pt is a new admit this afternoon.  Placed on our vent AC 12, 450, +5, 30%.  RT will continue to monitor.

## 2022-12-29 NOTE — PHARMACY-ADMISSION MEDICATION HISTORY
"Pharmacy Note: LTACH Admission Drug Regimen Review    Initial admission medication history was not completed at [facility]. PTA med list was updated 12/15/22 with active medication orders at [facility] on 12/15/22 (on transfer from [facility] to [facility]). Please see Pharmacy - Admission Medication History note from 12/15/2022.    Medication orders were signed & held for discharge from transferring facility? Yes         Admission drug regimen review has been completed. The following medication issues were identified.    1. Will need pharmacy consult to manage tacrolimus with transplant team.    Finishing up 14 days course of Unasyn for SBP - last day 1/12/22      The following PTA medications were not continued during hospitalization at [facility]:  -- PTA med list wasn't updated at [facility] - per pharmacy notes at [facility] \"PTA meds - attempted to talk to wife re: meds, she notes he's on amlodipine, lisinopril, tacrolimus, and some supplements. She says his meds are mailed to their house and gets meds thru Dr. Simms[facility] transplant. No records for last 2 years but she thinks the dose we have \"sounds right\". I attempted to call the mail order pharmacy [facility] that last prescribed meds in chart, but they're not open until Monday. Follow up if able -ckg / I called wife when home to look at Rx bottles - he had bottles for amlodipine, atenolol, tacrolimus but all from 2020/2021 but she thinks that he is compliant with meds (unsure if he uses old Rx bottles); wife confirmed did not have meds AM 11/12 but unsure when last had. [facility]  11/14 Called [facility] and they have not filled anything for him since August 2020. Appears non compliant based on all information provided. cjj\"    Thank you for the opportunity to participate in the care of this patient.    Mariah Iglesias, [facility],BCCCP, BCCP    12/29/2022 3:14 PM       "

## 2022-12-29 NOTE — PROGRESS NOTES
RT PROGRESS NOTE     DATA:     CURRENT SETTINGS:             TRACH TYPE / SIZE:  #6 Shiley XLT Proximal (placed 11/29)             MODE:   PS 12/5             FIO2:   30%     ACTION:             THERAPIES:   Duoneb QID, Mucomyst BID             SUCTION:                           FREQUENCY:   No Suction Needed                        AMOUNT:   NA                        CONSISTENCY:   NA                        COLOR:   NA             SPONTANEOUS COUGH EFFORT/STRENGTH OF EFFORT (not elicited by suctioning): Not Witnessed                           WEANING PHASE:   Phase 1                        WEAN MODE:    PS 12/5                        WEAN TIME:   15:36pm                        END WEAN REASON:   Still Weaning     RESPONSE:             BS:   Clear/Diminished             VITAL SIGNS:   Sating 97-98%, -115, RR 24-27             EMOTIONAL NEEDS / CONCERNS:  NA                RISK FOR SELF DECANNULATION:  No     NOTE / PLAN:   Phase 1 during the day and full vent at night.  Full vent settings are AC 12, 450, +5, 30%.  RT will continue to monitor.

## 2022-12-29 NOTE — PLAN OF CARE
Problem: Mechanical Ventilation Invasive  Goal: Effective Communication  Outcome: Progressing  Goal: Optimal Device Function  Outcome: Progressing  Intervention: Optimize Device Care and Function  Recent Flowsheet Documentation  Taken 12/29/2022 1536 by Rocio Giang, RT  Airway Safety Measures: all equipment/monitors on and audible  Taken 12/29/2022 1345 by Rocio Giang, RT  Airway Safety Measures: all equipment/monitors on and audible  Goal: Mechanical Ventilation Liberation  Outcome: Progressing  Goal: Absence of Device-Related Skin and Tissue Injury  Outcome: Progressing  Goal: Absence of Ventilator-Induced Lung Injury  Outcome: Progressing   Goal Outcome Evaluation:

## 2022-12-29 NOTE — DISCHARGE SUMMARY
"Pharmacy Note: LTACH Admission Drug Regimen Review    Initial admission medication history was not completed at [facility]. PTA med list was updated 12/15/22 with active medication orders at [facility] on 12/15/22 (on transfer from [facility] to [facility]). Please see Pharmacy - Admission Medication History note from 12/15/2022.    Medication orders were signed & held for discharge from transferring facility? Yes         Admission drug regimen review has been completed. The following medication issues were identified.    1. Will need pharmacy consult to manage tacrolimus with transplant team.    Finishing up 14 days course of Unasyn for SBP - last day 1/12/22      The following PTA medications were not continued during hospitalization at [facility]:  -- PTA med list wasn't updated at [facility] - per pharmacy notes at [facility] \"PTA meds - attempted to talk to wife re: meds, she notes he's on amlodipine, lisinopril, tacrolimus, and some supplements. She says his meds are mailed to their house and gets meds thru Dr. Simms--[facility] transplant. No records for last 2 years but she thinks the dose we have \"sounds right\". I attempted to call the mail order pharmacy [facility] that last prescribed meds in chart, but they're not open until Monday. Follow up if able -ckg / I called wife when home to look at Rx bottles - he had bottles for amlodipine, atenolol, tacrolimus but all from 2020/2021 but she thinks that he is compliant with meds (unsure if he uses old Rx bottles); wife confirmed did not have meds AM 11/12 but unsure when last had. [facility]  11/14 Called [facility] and they have not filled anything for him since August 2020. Appears non compliant based on all information provided. cjj\"    Thank you for the opportunity to participate in the care of this patient.    Mariah Iglesias, [facility],BCCCP, BCCP    12/29/2022 3:14 PM       "

## 2022-12-29 NOTE — PROGRESS NOTES
[facility] ICU RESPIRATORY NOTE        Date of Admission: 12/16/2022    Date of Intubation (most recent): 12/16/22    Reason for Mechanical Ventilation: chronic trachea    Number of Days on Mechanical Ventilation: respiatory failure    Met Criteria for Spontaneous Breathing Trial: Yes     Pt has been on Ps of 5/10    Significant Events Today: None    ABG Results: No lab results found in last 7 days.    Current Vent Settings: Vent Mode: CPAP/PS  (Continuous positive airway pressure with Pressure Support)  FiO2 (%): 30 %  Resp Rate (Set): 12 breaths/min  Tidal Volume (Set, mL): 450 mL  PEEP (cm H2O): 5 cmH2O  Pressure Support (cm H2O): 10 cmH2O  Resp: 17      Skin Assessment: intact    Plan: Keep weaning as ashley.    RT Brigitte on 12/28/2022 at 10:23 PM

## 2022-12-29 NOTE — H&P
LTACH    History and Physical - Hospitalist Service       Date of Admission:  12/29/2022    Assessment & Plan      Blanco Osborne is a 57 year old male admitted to LTAC on 12/29/2022.     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  Initial event was parainfluenza and strep pneumo pneumonia.  Had failed extubation x2 and tracheostomy performed last hospitalization.  Had additional complications of bilateral pneumothoraces.  (Most recently was a hydropneumothorax where fluid grew Candida..  Chest tube was placed and removed during this hospitalization.  Had decent tolerance for pressure support and trach dome but after few hours and trach dome 1227 had a pretty significant episode of desaturation.   Plan:  1.  We will alternate trach dome and pressure support and try to wean.  Pulmonary is consulted.   2.  At least 4 weeks of antimicrobial for Candida detailed in ID section     Cardiovascular system:  PEA arrest prior to his previous admission and 1 episode of PEA associated with desaturation on 1220.  No structural cardiac disease but does have A. Fib which neurologist thought might have been contributory to his embolic strokes.  Anticoagulation been restarted with stable labs although high risk for  bleeding seconday to thrombocytopenia.  Also has developed baseline hypotension and is on midodrine 10 mg 3 times daily.  Plan:  Continue Eliquis.  Continue current midodrine dose      CNS:  1. Seizure after hypoxic event.  This is in the context of baseline multifactorial encephalopathy secondary to his ongoing critical illness and prior cardiac arrests and prior strokes and cirrhosis with hepatic encephalopathy. MRI of head of 12/20/22 reveals no PRES or leptomeningeal enhancement but scattered.  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   Plan:  1.  Keppra and vimpat indefinitely.  Neurology follow-up.  2.  Anticoagulation indefinitely for secondary stroke prevention.  3. Tylenol TID for headache.        Renal/Electrolytes:  1. LORETTA, ?CRI: Now on iHD.  No return of renal function.  - [facility] schedule      ID:  1. 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 4.  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  Have had some conversations within our group and with transplant ID and general infectious disease.  General consensus is that ganciclovir probably could be discontinued  2. H/o Strep/Parainfluenza infection last hospitalization. Completed treatment course  3. 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  4.  Lactobacillus growing in the broth 4 days after paracentesis.  Cell count was very low.  5.  Candida in left pleural fluid cultures.  Sensitivities not resulted.  6.  Immunosuppressed on tacrolimus.  Discussed with transplant service at the [facility] who felt that given the absence of rejection on his most recent biopsy and ongoing issues with infection continuing with 1 twice daily tacrolimus is the best current option.  They have seen his subtherapeutic trough levels.  Plan:  1.  Confirm with transplant ID whether or not we can stop ganciclovir.  2.  Unasyn for 2 weeks total.  Change to Augmentin through 1/12/2023.  3.  At least 4 weeks of antimicrobial for the Candida.  If sensitive  to fluconazole could de-escalate from micafungin.  4.  Continue tacrolimus as above and we are tapering steroids     GI/:  Post transplant cirrhosis.  Main manifestations of this are hepatic encephalopathy and ascites.  Hepatologist at [facility] felt that most likely reason for the cirrhosis was metabolic syndrome or alcohol intake.  There was no evidence of rejection on biopsy and there was some evidence of regeneration. Paracentesis done on 12/22/2022 with lack of bacillus in the broth indicating SBP low cell count was very low  Plan:  1.  Intermittent paracentesis as needed.  2.  Getting 1 mg  tacrolimus BID and tapering steroids   3. Lactulose and Rifaximin as above  4.  Treat SBP for 2 weeks.  5.  He has an appointment in April with Dr. Simms if he is out of the hospital.     Endocrine:  1. DM.  Titrating insulin. He needs supplemental coverage.  2. He is on steroids which we are weaning  Plan:  Continue with current regimen.      Nutrition:  At goal tube feeds via PEG tube     Heme/Onc:  1. Pancytopenia; possibly due cirrhosis. WBC count has improved . Chronic splenomegaly was present prior to liver transplant but has not regressed.   2. Needs anticoagulation for AFib.   Plan:  1.  Continue Eliquis        ICU prophylaxis:      DVT PPX: Eliquis      Restraints needed: No     Stress ulcer: IV PPI     Central line: Needed for IV access/Meds.     Cardiac Monitoring: None  Code Status: Full Code          Disposition Plan:   Expected Discharge Date: 01/14/2023    Discharge Delays: Complex Discharge    Discharge Comments: Vent. Trach. Phase 1. PEG.  Rectal Tube.           The patient's care was discussed with the Bedside Nurse, Patient and Patient's Family.     Diet: Adult Formula Drip Feeding: Continuous Novasource Renal; Gastrostomy; Goal Rate: 60; mL/hr  Adult Formula Drip Feeding: Continuous Novasource Renal; Gastrostomy; Goal Rate: 60; mL/hr; Increase TF now to 50 mL/hr; after 12 hrs increase to goal 60 mL/hr     DVT Prophylaxis: DOAC  Nixon Catheter: Not present PICC 11/24/22 Triple Lumen Right Brachial vein medial Access-Site Assessment: WDL CVC Double Lumen Right External jugular Tunneled-Site Assessment: WDL  Central Lines: PRESENT     Cardiac Monitoring: None  Code Status: Full Code      Clinically Significant Risk Factors Present on Admission              # Hypoalbuminemia: Lowest albumin = 1.8 g/dL at 12/29/2022  4:40 AM, will monitor as appropriate  # Drug Induced Coagulation Defect: home medication list includes an anticoagulant medication                 Disposition Plan         The patient's care was discussed with the Bedside Nurse, Patient and Patient's Family.    Edgar Fritz MD  Hospitalist Service  LTACH  Securely message with the Vocera Web Console (learn more here)  Text page via [facility] Paging/Directory     _____________________________________________________________________    Chief Complaint   Generalized weakness and ventilator dependency with CRF on hemodialysis needing rehab and continuation of antibiotic treatment for pneumonia and peritonitis.    History is obtained from the electronic medical record.    History of Present Illness   Blanco Osborne is an 57 year old male who is transferred from [facility] for rehab and continuation of IV antibiotic treatment.  Patient has multiple medical problems including history of liver transplant 2016, PAF on chronic anticoagulation, history of DM2, CVA, HTN, CHRISTIAN on BiPAP, and history of alcohol abuse in the past causing liver damage ending with liver transplant.  About 6 weeks ago he had respiratory arrest and was diagnosed with infection with parainfluenza and strep pneumoniae and acute on chronic renal insufficiency ending with CRF needing 3/week hemodialysis.  During this period he was diagnosed with cirrhosis of transplanted liver and hyperammonia.  Currently he is on Lactulose and Rifaximin.        On 12/14/22, due to CIP, he was transferred  to [facility] for the first time with Trach, PEG and Rt chest tube.    On 12/16/2022 patient was transferred back to [facility] due to respiratory insufficiency secondary to hydropneumothorax and drainage of 900 cc bloody pleural effusion, bloody stool and transfusion of 2 units PRBC.       In [facility] on 12/20/2022 he had another episode of PEA arrest followed by CPR x2 minutes and possibly had seizure activity. 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  He has been on ventilator and has improved to trach dome tolerating up to 6 hours so far. He had chest tube which was removed.    He had SBP with growth of lactobacillus and is currently on Unasyn which will be switched to Augmentin orally through 1/12/2023.    Pleural effusion grew Candida and is on micafungin.  He is also on  due to cirrhosis and hyperammonia.  He is anemic and is on Retacrit.   He is on lacosamide and Keppra due to history of seizure.  There is question of him drinking again causing him cirrhosis of the transplanted liver.     Review of Systems    The 10 point Review of Systems is negative other than noted in the HPI or here.  Currently patient denies any headache or nausea or vomiting or abdominal pain or diarrhea.  No dysuria or hematuria.  No chest pain or SOB.  No fever or chills.  No cough or congestion.    Past Medical History    I have reviewed this patient's medical history and updated it with pertinent information if needed.   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  Past Surgical History:   Procedure Laterality Date     APPENDECTOMY       BENCH LIVER N/A 2016    Procedure: BENCH LIVER;  Surgeon: Enoc Crews MD;  Location: [facility] OR     BRONCHOSCOPY FLEXIBLE AND RIGID N/A 2022    Procedure: BRONCHOSCOPY;  Surgeon: Alena Valenzuela MD;  Location:  GI     COLONOSCOPY  13    repeat in 2018     COLONOSCOPY N/A 10/4/2019    Procedure: COLONOSCOPY, WITH POLYPECTOMY AND BIOPSY;  Surgeon: Go Chong MD;  Location: [facility] OR     HERNIA REPAIR       IR CHEST TUBE PLACEMENT NON-TUNNELED RIGHT  2022     IR CVC TUNNEL PLACEMENT > 5 YRS OF AGE  2022     IR GASTROSTOMY TUBE PERCUTANEOUS PLCMNT  2022     IR PARACENTESIS  2022     IR PARACENTESIS  2022     IR THORACENTESIS  2022     IR TRANSCATHETER BIOPSY  2022     TRACHEOSTOMY N/A 2022    Procedure: TRACHEOSTOMY;  Surgeon: Nilesh Jackson MD;  Location:  OR     TRANSPLANT LIVER RECIPIENT  DONOR N/A 2016    Procedure: TRANSPLANT LIVER RECIPIENT  DONOR;  Surgeon: Enoc Crews MD;  Location:  OR       Social History   I have reviewed this patient's social history and updated it with pertinent information if needed.  Social History     Tobacco Use     Smoking status: Never     Smokeless tobacco: Never   Substance Use Topics     Alcohol use: Not Currently     Alcohol/week: 0.0 standard drinks     Drug use: No       Family History     No significant family history.    Prior to Admission Medications   Prior to Admission  Medications   Prescriptions Last Dose Informant Patient Reported? Taking?   Multiple Vitamins-Minerals (MULTIVITAMINS W/MINERALS) liquid   No No   Sig: 15 mLs by Per Feeding Tube route daily   acetylcysteine (MUCOMYST) 20 % neb solution   No No   Sig: Take 2 mLs by nebulization 2 times daily   albuterol (PROVENTIL) (2.5 MG/3ML) 0.083% neb solution   No No   Sig: Take 1 vial (2.5 mg) by nebulization every 2 hours as needed for shortness of breath   apixaban ANTICOAGULANT (ELIQUIS) 5 MG tablet   No No   Si tablet (5 mg) by Oral or Feeding Tube route 2 times daily   calcium carbonate (TUMS) 500 MG chewable tablet   Yes No   Sig: Take 1-2 chew tab by mouth 4 times daily as needed for heartburn   carboxymethylcellulose PF (REFRESH PLUS) 0.5 % ophthalmic solution   No No   Sig: Place 1 drop into both eyes 3 times daily   famotidine (PEPCID) 20 MG tablet   Yes No   Sig: Take 20 mg by mouth 2 times daily as needed (heartburn)   folic acid 5 MG/ML injection   No No   Sig: Inject 0.2 mLs (1 mg) into the vein daily   glucose 40 % (400 mg/mL) gel   No No   Sig: Take 15-30 g by mouth every 15 minutes as needed for low blood sugar   hydrocortisone sodium succinate PF (SOLU-CORTEF) 100 MG injection   No No   Sig: Inject 0.5 mLs (25 mg) into the vein every 6 hours   insulin glargine (LANTUS PEN) 100 UNIT/ML pen   No No   Sig: Inject 30 Units Subcutaneous every morning (before breakfast)   ipratropium - albuterol 0.5 mg/2.5 mg/3 mL (DUONEB) 0.5-2.5 (3) MG/3ML neb solution   No No   Sig: Take 1 vial (3 mLs) by nebulization 4 times daily   lactulose (CHRONULAC) 10 GM/15ML solution   No No   Si mLs (20 g) by Oral or Feeding Tube route 3 times daily   midodrine (PROAMATINE) 10 MG tablet   No No   Si tablet (10 mg) by Oral or Feeding Tube route 3 times daily (with meals)   nystatin (MYCOSTATIN) 828108 UNIT/ML suspension   No No   Sig: Swish and swallow 5 mLs (500,000 Units) in mouth 4 times daily   ondansetron (ZOFRAN ODT) 4  MG ODT tab   No No   Sig: Take 1 tablet (4 mg) by mouth every 6 hours as needed for nausea or vomiting   order for DME   No No   Sig: Equipment being ordered: Class 2 (30-40mmHg) compression knee high stockings, night time knee high compression alternative, bandaging supplies   Patient not taking: Reported on 2019   order for DME   No No   Sig: Equipment being ordered: Compression knee high stockings for B LE lymphedema 20-30mmHg   Patient not taking: Reported on 2019   oxyCODONE (ROXICODONE) 5 MG tablet   No No   Si tablet (5 mg) by Per Feeding Tube route every 4 hours as needed for moderate pain (4-6)   pantoprazole (PROTONIX) 2 mg/mL SUSP suspension   No No   Si mLs (40 mg) by Per Feeding Tube route every morning (before breakfast)   rifaximin (XIFAXAN) 550 MG TABS tablet   No No   Si tablet (550 mg) by Per Feeding Tube route 2 times daily   tacrolimus (GENERIC EQUIVALENT) 1 mg/mL suspension   No No   Si mL (1 mg) by Oral or Feeding Tube route 2 times daily      Facility-Administered Medications: None     Allergies   No Known Allergies    Physical Exam   Vital Signs: Temp: 99.1  F (37.3  C) Temp src: Oral BP: (!) 140/94 Pulse: 100   Resp: 24 SpO2: 98 % O2 Device: Mechanical Ventilator    Weight: 0 lbs 0 oz  Patient looks weak and pale.  Sitting comfortably in his bed.  Wife is at bedside.  He is not exactly distress.  He has tracheostomy place.  HEENT: Pale.  Decreased oral mucosal moisture.  No jaundice.  Neck: Supple.  Tracheostomy in place.  On trach dome  Chest is clear to auscultation  Heart with regular rate and rhythm, no murmur  Abdomen: PEG tube in place, with moderate ascites, nontender.  No organomegaly is noted.  Extremities without edema clubbing or cyanosis  Neurological exam: Patient is alert and oriented.  There is decreased left  power compared to the right on gross exam.  No pronator drift.  Otherwise no focal neurological deficit noted.    Data   Data reviewed  today: I reviewed all medications, new labs and imaging results over the last 24 hours. I personally reviewed no images or EKG's today.    Recent Labs   Lab 12/29/22  1641 12/29/22  1159 12/29/22  0755 12/29/22  0549 12/29/22  0440 12/28/22  0430 12/28/22  0428 12/27/22  1210 12/27/22  1120 12/27/22  0429 12/27/22  0420 12/26/22  0443 12/26/22  0436 12/24/22  1944 12/24/22  1808 12/23/22  0901 12/23/22  0401   WBC  --   --   --   --   --   --   --   --   --   --  7.5  --  4.8  --  5.8  --  5.3   HGB  --   --   --   --   --   --   --   --   --   --  8.8*  --  8.1*  --  8.4*  --  8.2*   MCV  --   --   --   --   --   --   --   --   --   --  107*  --  103*  --  106*  --  104*   PLT  --   --   --   --   --   --   --   --   --   --  63*  --  55*  --  58*  --  76*   NA  --   --   --   --  137  --  139  --   --   --  139  --   --    < >  --    < > 135   POTASSIUM  --   --   --   --  3.4  --  3.7  3.7  --  3.8  --  3.1*  --   --    < >  --    < > 3.3*   CHLORIDE  --   --   --   --  103  --  103  --   --   --  102  --   --    < >  --    < > 101   CO2  --   --   --   --  31  --  29  --   --   --  29  --   --    < >  --    < > 26   BUN  --   --   --   --  46*  --  62*  --   --   --  52*  --   --    < >  --    < > 48*   CR  --   --   --   --  2.40*  --  3.15*  --   --   --  2.61*  --   --    < >  --    < > 2.83*   ANIONGAP  --   --   --   --  3  --  7  --   --   --  8  --   --    < >  --    < > 8   KELLY  --   --   --   --  7.8*  --  7.7*  --   --   --  8.1*  --   --    < >  --    < > 7.4*   * 174* 172*   < > 160*   < > 117*   < >  --    < > 127*   < >  --    < >  --    < > 205*   ALBUMIN  --   --   --   --  1.8*  --  1.7*  --   --   --  2.1*  --   --    < >  --    < > 1.9*   PROTTOTAL  --   --   --   --   --   --   --   --   --   --   --   --   --   --   --   --  4.6*   BILITOTAL  --   --   --   --   --   --   --   --   --   --   --   --   --   --   --   --  0.6   ALKPHOS  --   --   --   --   --   --   --   --   --   --    --   --   --   --   --   --  198*   ALT  --   --   --   --   --   --   --   --   --   --   --   --   --   --   --   --  17   AST  --   --   --   --   --   --   --   --   --   --   --   --   --   --   --   --  14    < > = values in this interval not displayed.

## 2022-12-29 NOTE — DISCHARGE SUMMARY
[facility]    Discharge Summary  [facility]ensive [facility]    Date of Admission:  12/16/2022  Date of Discharge:  12/29/2022  Discharging Provider: Laura Fernandes MD    Discharge Diagnoses   Principal Problem:    Pneumothorax on left  Active Problems:    Cirrhosis of liver (H)    Liver transplanted (H)    Acute kidney injury (H)    Embolic stroke (H)    Encephalopathy    Chronic kidney disease, stage V (H)    PEA (Pulseless electrical activity) (H)    Seizures (H)    Human herpesvirus 6 viremia    Hypotension, unspecified hypotension type      History of Present Illness   Blanco Osborne is an 57 year old male who presented with hemopneumothorax as a transfer from [facility].     Hospital Course   .     57 year old male with paroxysmal A. fib, Liver transplant (2016), CHRISTIAN on BiPAP, h/o CVA and hypertension. Patient  s/p Trach/PEG following acu had Rt sided Chest tube placed for hydroptx on 12/12/22 and  transferred to [place] on 12/14/22 for CIP.  Pt  noted to have bloody stool on 12/15 and 12/16 and had left Ptx which required left sided chest tube leading to ~900ml of bloody output. Patient with PEA arrest 12/20/22 and then seizure while on HD.  Overall stable and getting close to being ready for transfer back to [facility].  The following problems were addressed during his hospitalization listed by organ system.      Neurology and Psychiatry:  1. Seizure after hypoxic event.  This is in the context of baseline multifactorial encephalopathy secondary to his ongoing critical illness and prior cardiac arrests and prior strokes and cirrhosis with hepatic encephalopathy.MRI head from 12/20/22 -No PRES or leptomeningeal enhancement but scattered.  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 Unlikely to be a pathogen and Gancyclovir was stopped. Addendum: Today, 12/29, wife tells me patient has been having daily headaches during this hospitalization. Patient confirms this. Historically he has not wanted to take  pain medication.   Plan:  1.  Keppra and vimpat indefinitely.  Neurology follow-up.  2.  Anticoagulation indefinitely for secondary stroke prevention.  3. Have ordered Tylenol TID. Asked wife/patient to assess whether this is helpful to determine whether this should be a regularly scheduled medication.      Pulmonary:  Acute now chronic respiratory failure requiring tracheostomy..  Initial event was parainfluenza and strep pneumo pneumonia.  Had failed extubation x2 and tracheostomy performed last hospitalization.  Had additional complications of bilateral pneumothoraces.  (Most recently was a hydropneumothorax where fluid grew Candida..  Chest tube was placed and removed during this hospitalization.  Had decent tolerance for pressure support and trach dome but after few hours and trach dome 1227 had a pretty significant episode of desaturation.   Plan:  1.  We will alternate trach dome and pressure support going forward.    2.  At least 4 weeks of antimicrobial for Candida detailed in ID section    Cardiovascular system:  PEA arrest prior to his previous admission and 1 episode of PEA associated with desaturation on 1220.  No structural cardiac disease but does have A. Fib which neurologist thought might have been contributory to his embolic strokes.  Anticoagulation been restarted with stable labs although high risk for bleeding seconday to thrombocytopenia.  Also has developed baseline hypotension and is on midodrine 10 mg 3 times daily.  Plan:  Continue Eliquis.  Continue current midodrine dose    Renal/Electrolytes:  1. LORETTA: Now on iHD.  No return of renal function.  - [facility] schedule     ID:  1. 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 4.  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  Have had some conversations within our group and with transplant ID and general infectious disease.  General consensus is that ganciclovir probably could be discontinued  2. H/o Strep/Parainfluenza infection last hospitalization. Completed treatment course  3.  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  4.  Lactobacillus growing in the broth 4 days after paracentesis.  Cell count was very low.  5.  Candida in left pleural fluid cultures.  Sensitivities not resulted.  6.   immunosuppressed on tacrolimus.  Discussed with transplant service at the [place] who felt that given the absence of rejection on his most recent biopsy and ongoing issues with infection continuing with 1 twice daily tacrolimus is the best current option.  They have seen his subtherapeutic trough levels.    Plan:  1.  Confirm with transplant ID whether or not we can stop ganciclovir.  2.  Unasyn for 2 weeks total.  Could consider changing this to Augmentin.  3.  At least 4 weeks of antimicrobial for the Candida.  If sensitive to fluconazole could de-escalate from micafungin.  4.  Continue tacrolimus as above and we are tapering steroids    GI/:  Post transplant cirrhosis.  Main manifestations of this are hepatic encephalopathy and ascites.  Hepatologist at [place] felt that most likely reason for the cirrhosis was metabolic syndrome or alcohol intake.  There was no evidence of rejection on biopsy and there was some evidence of regeneration..  Paracentesis done on 12/22 with lack of bacillus in the broth indicating SBP low cell count was very low  plan:  1.  Intermittent paracentesis as needed.  2.  Getting 1 mg  tacrolimus BID and tapering steroids   3. Lactulose and Rifaximin as above  4.  Treat SBP for 2 weeks.  5.  He has an appointment in April with Dr. Simms if he is out of the hospital.      Endocrine:  1. DM.  Titrating insulin. He is needing some supplemental coverage.  2. On steroids which we are weaning  Plan:  Continue      Nutrition:  At goal tube feeds via percutaneous  G tube tube      Heme/Onc:  1.  Pancytopenia; possibly related to cirrhosis--WBC improved . Chronic splenomegaly-present prior to liver tx but has not regressed.   2. Needs anticoagulation for AFib.   Plan:  1.   Continue eliquis     ICU prophylaxis:      DVT; eliquis      Restraints needed: No     Stress ulcer: IV PPI     Central line: Needed today for IV access/Meds.    Code Status: Full    CC time: 80 minutes excluding time for procedures      Laura Fernandes MD    Significant Results and Procedures   EEG  Bronchoscopy  Arterial line  CXR  Paracentesis  CTs  MRIs      Pending Results   These results will be followed up by [facility]    Unresulted Labs Ordered in the Past 30 Days of this Admission     Date and Time Order Name Status Description    12/26/2022  2:03 PM Blood Culture Peripheral Blood Preliminary     12/26/2022  2:03 PM Blood Culture Arm, Right Preliminary     12/22/2022 12:43 PM Acid-Fast Bacilli Culture and Stain In process     12/21/2022  1:08 PM Serum Collection (Accompanies: [id number] or [id number] CSF Testing) In process     12/21/2022  1:08 PM Oligoclonal banding In process     12/21/2022 12:40 PM Fungus Culture, non-blood Preliminary     12/16/2022  9:45 PM Pleural fluid Aerobic Bacterial Culture Routine Preliminary     12/16/2022  4:15 PM Prepare red blood cells (unit) Preliminary     12/16/2022  7:30 AM Prepare red blood cells (unit) Preliminary     12/16/2022  7:30 AM Prepare red blood cells (unit) Preliminary           Code Status   Full Code    Primary Care Physician   Ki Powers        Time Spent on this Encounter   80 minutes CC time      Discharge Disposition   Transferred to [facility]  Condition at discharge: Stable    Consultations This Hospital Stay   NEPHROLOGY IP CONSULT  GASTROENTEROLOGY IP CONSULT  VASCULAR ACCESS ADULT IP CONSULT  WOUND OSTOMY CONTINENCE NURSE  IP CONSULT  THORACIC SURGERY IP CONSULT  INTERVENTIONAL RADIOLOGY ADULT/PEDS IP CONSULT  NUTRITION SERVICES ADULT IP CONSULT  NEPHROLOGY IP CONSULT  NUTRITION SERVICES ADULT IP CONSULT  WOUND OSTOMY CONTINENCE NURSE  IP CONSULT  NEUROLOGY CRITICAL CARE ADULT IP CONSULT  NUTRITION SERVICES ADULT IP CONSULT  PHARMACY IP  CONSULT  PHYSICAL THERAPY ADULT IP CONSULT  OCCUPATIONAL THERAPY ADULT IP CONSULT  PHARMACY IP CONSULT  PHARMACY IP CONSULT  CARE MANAGEMENT / SOCIAL WORK IP CONSULT  INFECTIOUS DISEASES IP CONSULT  SPEECH LANGUAGE PATH ADULT IP CONSULT  SPEECH LANGUAGE PATH ADULT IP CONSULT  PHARMACY IP CONSULT  SPEECH LANGUAGE PATH ADULT IP CONSULT  WOUND OSTOMY CONTINENCE NURSE  IP CONSULT  WOUND OSTOMY CONTINENCE NURSE  IP CONSULT  SPEECH LANGUAGE PATH ADULT IP CONSULT    Discharge Orders     Discharge Medications   Current Discharge Medication List      CONTINUE these medications which have NOT CHANGED    Details   acetylcysteine (MUCOMYST) 20 % neb solution Take 2 mLs by nebulization 2 times daily  Qty: 100 mL, Refills: 1    Associated Diagnoses: Critical illness myopathy      albuterol (PROVENTIL) (2.5 MG/3ML) 0.083% neb solution Take 1 vial (2.5 mg) by nebulization every 2 hours as needed for shortness of breath  Qty: 90 mL, Refills: 1    Associated Diagnoses: Critical illness myopathy      apixaban ANTICOAGULANT (ELIQUIS) 5 MG tablet 1 tablet (5 mg) by Oral or Feeding Tube route 2 times daily  Qty: 60 tablet, Refills: 1    Associated Diagnoses: Critical illness myopathy      calcium carbonate (TUMS) 500 MG chewable tablet Take 1-2 chew tab by mouth 4 times daily as needed for heartburn      carboxymethylcellulose PF (REFRESH PLUS) 0.5 % ophthalmic solution Place 1 drop into both eyes 3 times daily  Qty: 50 each, Refills: 1    Associated Diagnoses: Critical illness myopathy      famotidine (PEPCID) 20 MG tablet Take 20 mg by mouth 2 times daily as needed (heartburn)      folic acid 5 MG/ML injection Inject 0.2 mLs (1 mg) into the vein daily  Qty: 50 mL, Refills: 1    Associated Diagnoses: Critical illness myopathy      glucose 40 % (400 mg/mL) gel Take 15-30 g by mouth every 15 minutes as needed for low blood sugar  Qty: 100 mL, Refills: 1    Associated Diagnoses: Critical illness myopathy      hydrocortisone sodium  succinate PF (SOLU-CORTEF) 100 MG injection Inject 0.5 mLs (25 mg) into the vein every 6 hours  Qty: 100 mL, Refills: 1    Associated Diagnoses: Critical illness myopathy      insulin glargine (LANTUS PEN) 100 UNIT/ML pen Inject 30 Units Subcutaneous every morning (before breakfast)  Qty: 15 mL, Refills: 1    Comments: If Lantus is not covered by insurance, may substitute Basaglar or Semglee or other insulin glargine product per insurance preference at same dose and frequency.    Associated Diagnoses: Critical illness myopathy      ipratropium - albuterol 0.5 mg/2.5 mg/3 mL (DUONEB) 0.5-2.5 (3) MG/3ML neb solution Take 1 vial (3 mLs) by nebulization 4 times daily  Qty: 90 mL, Refills: 1    Associated Diagnoses: Critical illness myopathy      lactulose (CHRONULAC) 10 GM/15ML solution 30 mLs (20 g) by Oral or Feeding Tube route 3 times daily  Qty: 100 mL, Refills: 1    Associated Diagnoses: Critical illness myopathy      midodrine (PROAMATINE) 10 MG tablet 1 tablet (10 mg) by Oral or Feeding Tube route 3 times daily (with meals)  Qty: 100 tablet, Refills: 1    Associated Diagnoses: Critical illness myopathy      Multiple Vitamins-Minerals (MULTIVITAMINS W/MINERALS) liquid 15 mLs by Per Feeding Tube route daily  Qty: 100 mL, Refills: 1    Associated Diagnoses: Critical illness myopathy      nystatin (MYCOSTATIN) 176279 UNIT/ML suspension Swish and swallow 5 mLs (500,000 Units) in mouth 4 times daily  Qty: 100 mL, Refills: 1    Associated Diagnoses: Critical illness myopathy      ondansetron (ZOFRAN ODT) 4 MG ODT tab Take 1 tablet (4 mg) by mouth every 6 hours as needed for nausea or vomiting  Qty: 30 tablet, Refills: 1    Associated Diagnoses: Critical illness myopathy      oxyCODONE (ROXICODONE) 5 MG tablet 1 tablet (5 mg) by Per Feeding Tube route every 4 hours as needed for moderate pain (4-6)  Qty: 60 tablet, Refills: 0    Associated Diagnoses: Critical illness myopathy      pantoprazole (PROTONIX) 2 mg/mL SUSP  suspension 20 mLs (40 mg) by Per Feeding Tube route every morning (before breakfast)  Qty: 100 mL, Refills: 1    Associated Diagnoses: Critical illness myopathy      rifaximin (XIFAXAN) 550 MG TABS tablet 1 tablet (550 mg) by Per Feeding Tube route 2 times daily  Qty: 60 tablet, Refills: 1    Associated Diagnoses: Critical illness myopathy      tacrolimus (GENERIC EQUIVALENT) 1 mg/mL suspension 1 mL (1 mg) by Oral or Feeding Tube route 2 times daily  Qty: 100 mL, Refills: 4    Associated Diagnoses: Critical illness myopathy      !! order for DME Equipment being ordered: Compression knee high stockings for B LE lymphedema 20-30mmHg  Qty: 1 each, Refills: 0    Associated Diagnoses: Lymphedema of both lower extremities      !! order for DME Equipment being ordered: Class 2 (30-40mmHg) compression knee high stockings, night time knee high compression alternative, bandaging supplies  Qty: 1 each, Refills: 0    Associated Diagnoses: Lymphedema of both lower extremities       !! - Potential duplicate medications found. Please discuss with provider.      STOP taking these medications       epoetin mirta-epbx (RETACRIT) 25092 UNIT/ML injection Comments:   Reason for Stopping:             Allergies   No Known Allergies  Data

## 2022-12-29 NOTE — PLAN OF CARE
Shift Summary  Assumed cares from [id number].    Neuro: Alert, appears some confusion to time/situation. Follows commands, PERRL. LUE seems slightly weaker than RUE. BLE appears equal in strength.   Cardiac: NSR. Soft to normotension. Pt gets midodrine.   Pulmonary: LS coarse, on PS this shift. Pt has large amounts of thick, yellow secretions.   GI: Rectal tube with approximately 100mL liquid stools. PEG with TF running at goal.   : Anuric. Pt received HD today; 2L fluid pulled off.   Integumentary: Lots of scattered bruising/petechiae and scabs.  Restraints: Not needed  Activity: Bedrest    Plan of care has been explained to patient: Yes    Jerrica Figueroa RN

## 2022-12-29 NOTE — PROGRESS NOTES
Assessment and Plan:   LORETTA: on dialysis. Ran yesterday without problems: 3h, 3K 2 L UF, R CVC with 400 BFR, no heparin.   He is discharging today. Will cont MWF dialysis at [facility].             Interval History:   Cirrhosis: CARRILLO, S/P liver transplant. On tacrolimus, prednisone,   Afib  CHRISTIAN  HT  Anemia: has had transfusions.   S/P trach, PEG. Adm 12/16/22 with lower GI bleed and L hemothorax.                         Review of Systems:   No problems on dialysis. C/O headache.          Medications:       - MEDICATION INSTRUCTIONS for Dialysis Patients -   Does not apply See Admin Instructions     ampicillin-sulbactam  3 g Intravenous Q24H     apixaban ANTICOAGULANT  5 mg Oral or Feeding Tube BID     B and C vitamin Complex with folic acid  5 mL Per Feeding Tube Daily     carboxymethylcellulose PF  1 drop Both Eyes TID     chlorhexidine  15 mL Mouth/Throat Q12H     folic acid  1 mg Oral or Feeding Tube Daily     insulin aspart  1-6 Units Subcutaneous Q4H     insulin glargine  20 Units Subcutaneous Daily     ipratropium - albuterol 0.5 mg/2.5 mg/3 mL  3 mL Nebulization Q6H     lacosamide  100 mg Oral or Feeding Tube BID     lactulose  20 g Oral or Feeding Tube TID     levETIRAcetam  1,000 mg Oral or Feeding Tube Q24H     micafungin  100 mg Intravenous Q24H     midodrine  10 mg Oral or Feeding Tube TID w/meals     multivitamins w/minerals  15 mL Per Feeding Tube Daily     nystatin  500,000 Units Swish & Swallow 4x Daily     pantoprazole  40 mg Per Feeding Tube Q12H     [START ON 12/30/2022] predniSONE  2.5 mg Oral Daily     QUEtiapine  50 mg Oral or Feeding Tube At Bedtime     rifaximin  550 mg Per Feeding Tube BID     tacrolimus  1 mg Oral or Feeding Tube BID       dextrose Stopped (12/28/22 0100)     Current active medications and PTA medications reviewed, see medication list for details.            Physical Exam:   Vitals were reviewed  Patient Vitals for the past 24 hrs:   BP Temp Temp src Pulse Resp SpO2    22 1117 -- -- -- -- -- 100 %   22 1100 (!) 139/90 -- -- 106 24 95 %   22 1000 127/78 -- -- 96 16 97 %   22 0900 129/71 -- -- 102 20 97 %   22 0800 130/81 (!) 96.6  F (35.9  C) Axillary 87 19 97 %   22 0700 121/74 -- -- 90 16 95 %   22 0333 -- -- -- -- -- 98 %   22 0300 125/79 -- -- 88 14 99 %   22 0200 113/73 -- -- 87 10 96 %   22 0100 105/62 -- -- 89 14 93 %   22 0010 103/64 -- -- 91 16 95 %   22 0000 -- 98.4  F (36.9  C) Oral 90 18 97 %   22 2356 -- -- -- -- -- 98 %   22 2300 98/66 -- -- 90 16 98 %   22 2200 122/85 -- -- 87 16 97 %   22 2100 122/78 -- -- 92 15 96 %   22 2000 122/77 98.2  F (36.8  C) Oral 96 17 100 %   22 1900 123/83 -- -- 96 21 100 %   22 1800 115/77 -- -- 91 15 100 %   22 1704 -- -- -- 93 18 100 %   22 1700 110/78 -- -- 93 18 (!) 89 %   22 1600 114/82 -- -- 93 16 100 %   22 1400 107/75 -- -- 96 10 95 %   22 1230 100/69 97.6  F (36.4  C) Oral 100 14 91 %   22 1225 105/75 -- -- 103 21 96 %   22 1215 104/69 -- -- 99 14 94 %   22 1200 106/76 -- -- 96 15 93 %   22 1145 97/67 -- -- 95 10 96 %       Temp:  [96.6  F (35.9  C)-98.4  F (36.9  C)] 96.6  F (35.9  C)  Pulse:  [] 106  Resp:  [10-24] 24  BP: ()/(62-90) 139/90  FiO2 (%):  [30 %] 30 %  SpO2:  [89 %-100 %] 100 %    Temperatures:  Current - Temp: (!) 96.6  F (35.9  C); Max - Temp  Av.7  F (36.5  C)  Min: 96.6  F (35.9  C)  Max: 98.4  F (36.9  C)  Respiration range: Resp  Av.1  Min: 10  Max: 24  Pulse range: Pulse  Av.8  Min: 87  Max: 106  Blood pressure range: Systolic (24hrs), Av , Min:97 , Max:139   ; Diastolic (24hrs), Av, Min:62, Max:90    Pulse oximetry range: SpO2  Av.5 %  Min: 89 %  Max: 100 %    I/O last 3 completed shifts:  In: 2750 [I.V.:200; NG/GT:150]  Out: 2900 [Other:2000; Stool:900]      Intake/Output Summary (Last 24 hours)  at 12/29/2022 1133  Last data filed at 12/29/2022 0600  Gross per 24 hour   Intake 2480 ml   Output 2900 ml   Net -420 ml       Alert and responsive  Sitting up in chair  Trached, R CVC in place       Wt Readings from Last 4 Encounters:   12/28/22 96 kg (211 lb 10.3 oz)   12/16/22 100.2 kg (221 lb)   12/16/22 100.2 kg (221 lb)   12/15/22 87 kg (191 lb 12.8 oz)          Data:          Lab Results   Component Value Date     12/29/2022     12/28/2022     12/27/2022     04/20/2020     10/07/2019     02/20/2019    Lab Results   Component Value Date    CHLORIDE 103 12/29/2022    CHLORIDE 103 12/28/2022    CHLORIDE 102 12/27/2022    CHLORIDE 105 08/05/2020    CHLORIDE 106 04/20/2020    CHLORIDE 99 10/07/2019    CHLORIDE 108 08/01/2019    CHLORIDE 106 02/20/2019    Lab Results   Component Value Date    BUN 46 12/29/2022    BUN 62 12/28/2022    BUN 52 12/27/2022    BUN 23 04/20/2020    BUN 18 10/07/2019    BUN 20 02/20/2019      Lab Results   Component Value Date    POTASSIUM 3.4 12/29/2022    POTASSIUM 3.7 12/28/2022    POTASSIUM 3.7 12/28/2022    POTASSIUM 3.9 04/20/2020    POTASSIUM 4.3 10/07/2019    POTASSIUM 4.2 02/20/2019    Lab Results   Component Value Date    CO2 31 12/29/2022    CO2 29 12/28/2022    CO2 29 12/27/2022    CO2 28 04/20/2020    CO2 26 10/07/2019    CO2 24 02/20/2019    Lab Results   Component Value Date    CR 2.40 12/29/2022    CR 3.15 12/28/2022    CR 2.61 12/27/2022    CR 1.06 04/20/2020    CR 1.01 10/07/2019    CR 1.08 02/20/2019        Recent Labs   Lab Test 12/27/22  0420 12/26/22  0436 12/24/22  1808   WBC 7.5 4.8 5.8   HGB 8.8* 8.1* 8.4*   HCT 29.4* 26.1* 28.4*   * 103* 106*   PLT 63* 55* 58*     Recent Labs   Lab Test 12/23/22  0401 12/22/22  1108 12/21/22  0405 12/20/22  1227 12/20/22  0426 12/18/22  0013 12/16/22  2239   AST 14 16 20  --    < >  --  21   ALT 17 17 17  --    < >  --  20   ALKPHOS 198* 163* 133  --    < >  --  251*   BILITOTAL 0.6 0.8  1.1  --    < >  --  0.9   CHANDLER  --   --   --  32  --  38 113*    < > = values in this interval not displayed.       Recent Labs   Lab Test 12/26/22  0435 12/24/22  0551 12/23/22  0401   MAG 2.0 1.9 1.8     Recent Labs   Lab Test 12/29/22  0440 12/28/22 0428 12/27/22 0420   PHOS 2.4* 2.8  2.8 2.4*     Recent Labs   Lab Test 12/29/22 0440 12/28/22 0428 12/27/22 0420   KELLY 7.8* 7.7* 8.1*       Lab Results   Component Value Date    KELLY 7.8 (L) 12/29/2022     Lab Results   Component Value Date    WBC 7.5 12/27/2022    HGB 8.8 (L) 12/27/2022    HCT 29.4 (L) 12/27/2022     (H) 12/27/2022    PLT 63 (L) 12/27/2022     Lab Results   Component Value Date     12/29/2022    POTASSIUM 3.4 12/29/2022    CHLORIDE 103 12/29/2022    CO2 31 12/29/2022     (H) 12/29/2022     Lab Results   Component Value Date    BUN 46 (H) 12/29/2022    CR 2.40 (H) 12/29/2022     Lab Results   Component Value Date    MAG 2.0 12/26/2022     Lab Results   Component Value Date    PHOS 2.4 (L) 12/29/2022       Creatinine   Date Value Ref Range Status   12/29/2022 2.40 (H) 0.66 - 1.25 mg/dL Final   12/28/2022 3.15 (H) 0.66 - 1.25 mg/dL Final   12/27/2022 2.61 (H) 0.66 - 1.25 mg/dL Final   12/26/2022 3.08 (H) 0.66 - 1.25 mg/dL Final   12/24/2022 2.25 (H) 0.66 - 1.25 mg/dL Final   12/23/2022 2.83 (H) 0.66 - 1.25 mg/dL Final   04/20/2020 1.06 0.66 - 1.25 mg/dL Final   10/07/2019 1.01 0.66 - 1.25 mg/dL Final   02/20/2019 1.08 0.66 - 1.25 mg/dL Final   07/10/2018 1.32 (H) 0.66 - 1.25 mg/dL Final   10/31/2017 1.18 0.66 - 1.25 mg/dL Final   10/17/2017 1.26 (H) 0.66 - 1.25 mg/dL Final       Attestation:  I have reviewed today's vital signs, notes, medications, labs and imaging.     Elvis Mariano MD

## 2022-12-29 NOTE — DISCHARGE SUMMARY
[facility]    Discharge Summary  [facility] Intensive Care    Date of Admission:  12/16/2022  Date of Discharge:  12/29/2022  Discharging Provider: Laura Fernandes MD    Discharge Diagnoses   Principal Problem:    Acute on chronic respiratory failure with hypoxia (H)  Active Problems:    Acquired immunocompromised state (H)    Cirrhosis of liver (H)    NAFLD (nonalcoholic fatty liver disease)    Liver transplanted (H)    Immunosuppression (H)    Acute kidney injury (H)    Spontaneous bacterial peritonitis (H)    Critical illness myopathy    History of sudden cardiac arrest, PEA    Dependent on hemodialysis (H)      History of Present Illness   Blanco Osborne is an 57 year old male who presented with hemopneumothorax as a transfer from [facility].     Hospital Course   .     57 year old male with paroxysmal A. fib, Liver transplant (2016), CHRISTIAN on BiPAP, h/o CVA and hypertension. Patient  s/p Trach/PEG following acu had Rt sided Chest tube placed for hydroptx on 12/12/22 and  transferred to [place] on 12/14/22 for CIP.  Pt  noted to have bloody stool on 12/15 and 12/16 and had left Ptx which required left sided chest tube leading to ~900ml of bloody output. Patient with PEA arrest 12/20/22 and then seizure while on HD.  Overall stable and getting close to being ready for transfer back to LTAC.  The following problems were addressed during his hospitalization listed by organ system.      Neurology and Psychiatry:  1. Seizure after hypoxic event.  This is in the context of baseline multifactorial encephalopathy secondary to his ongoing critical illness and prior cardiac arrests and prior strokes and cirrhosis with hepatic encephalopathy.MRI head from 12/20/22 -No PRES or leptomeningeal enhancement but scattered.  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 Unlikely to be a pathogen and Gancyclovir was stopped. Addendum: Today, 12/29, wife tells me patient has been having daily headaches during this  hospitalization. Patient confirms this. Historically he has not wanted to take pain medication.   Plan:  1.  Keppra and vimpat indefinitely.  Neurology follow-up.  2.  Anticoagulation indefinitely for secondary stroke prevention.  3. Have ordered Tylenol TID. Asked wife/patient to assess whether this is helpful to determine whether this should be a regularly scheduled medication.      Pulmonary:  Acute now chronic respiratory failure requiring tracheostomy..  Initial event was parainfluenza and strep pneumo pneumonia.  Had failed extubation x2 and tracheostomy performed last hospitalization.  Had additional complications of bilateral pneumothoraces.  (Most recently was a hydropneumothorax where fluid grew Candida..  Chest tube was placed and removed during this hospitalization.  Had decent tolerance for pressure support and trach dome but after few hours and trach dome 1227 had a pretty significant episode of desaturation.   Plan:  1.  We will alternate trach dome and pressure support going forward.    2.  At least 4 weeks of antimicrobial for Candida detailed in ID section    Cardiovascular system:  PEA arrest prior to his previous admission and 1 episode of PEA associated with desaturation on 1220.  No structural cardiac disease but does have A. Fib which neurologist thought might have been contributory to his embolic strokes.  Anticoagulation been restarted with stable labs although high risk for bleeding seconday to thrombocytopenia.  Also has developed baseline hypotension and is on midodrine 10 mg 3 times daily.  Plan:  Continue Eliquis.  Continue current midodrine dose    Renal/Electrolytes:  1. LORETTA: Now on iHD.  No return of renal function.  - MWF schedule     ID:  1. 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 4.  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  Have had some conversations within our group and with transplant ID and general infectious disease.  General consensus is that ganciclovir probably could be discontinued  2. H/o  Strep/Parainfluenza infection last hospitalization. Completed treatment course  3. 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  4.  Lactobacillus growing in the broth 4 days after paracentesis.  Cell count was very low.  5.  Candida in left pleural fluid cultures.  Sensitivities not resulted.  6.   immunosuppressed on tacrolimus.  Discussed with transplant service at the [facility] who felt that given the absence of rejection on his most recent biopsy and ongoing issues with infection continuing with 1 twice daily tacrolimus is the best current option.  They have seen his subtherapeutic trough levels.    Plan:  1.  Confirm with transplant ID whether or not we can stop ganciclovir.  2.  Unasyn for 2 weeks total.  Could consider changing this to Augmentin.  3.  At least 4 weeks of antimicrobial for the Candida.  If sensitive to fluconazole could de-escalate from micafungin.  4.  Continue tacrolimus as above and we are tapering steroids    GI/:  Post transplant cirrhosis.  Main manifestations of this are hepatic encephalopathy and ascites.  Hepatologist at [facility] felt that most likely reason for the cirrhosis was metabolic syndrome or alcohol intake.  There was no evidence of rejection on biopsy and there was some evidence of regeneration..  Paracentesis done on 12/22 with lack of bacillus in the broth indicating SBP low cell count was very low  plan:  1.  Intermittent paracentesis as needed.  2.  Getting 1 mg  tacrolimus BID and tapering steroids   3. Lactulose and Rifaximin as above  4.  Treat SBP for 2 weeks.  5.  He has an appointment in April with Dr. Simms if he is out of the hospital.      Endocrine:  1. DM.  Titrating insulin. He is needing some supplemental coverage.  2. On steroids which we are weaning  Plan:  Continue      Nutrition:  At goal tube feeds via percutaneous  G tube tube      Heme/Onc:  1.  Pancytopenia; possibly related to cirrhosis--WBC improved . Chronic splenomegaly-present prior  to liver tx but has not regressed.   2. Needs anticoagulation for AFib.   Plan:  1.  Continue eliquis     ICU prophylaxis:      DVT; eliquis      Restraints needed: No     Stress ulcer: IV PPI     Central line: Needed today for IV access/Meds.    Code Status: Full    CC time: 80 minutes excluding time for procedures      Laura Fernandes MD    Significant Results and Procedures   EEG  Bronchoscopy  Arterial line  CXR  Paracentesis  CTs  MRIs      Pending Results   These results will be followed up by [facility]    Unresulted Labs Ordered in the Past 30 Days of this Admission     Date and Time Order Name Status Description    12/26/2022  2:03 PM Blood Culture Peripheral Blood Preliminary     12/26/2022  2:03 PM Blood Culture Arm, Right Preliminary     12/22/2022 12:43 PM Acid-Fast Bacilli Culture and Stain In process     12/21/2022  1:08 PM Serum Collection (Accompanies: [id number] or [id number] CSF Testing) In process     12/21/2022  1:08 PM Oligoclonal banding In process     12/21/2022 12:40 PM Fungus Culture, non-blood Preliminary     12/16/2022  9:45 PM Pleural fluid Aerobic Bacterial Culture Routine Preliminary     12/16/2022  4:15 PM Prepare red blood cells (unit) Preliminary     12/16/2022  7:30 AM Prepare red blood cells (unit) Preliminary     12/16/2022  7:30 AM Prepare red blood cells (unit) Preliminary           Code Status   Full Code    Primary Care Physician   Ki Powers        Time Spent on this Encounter   80 minutes CC time      Discharge Disposition   Transferred to [facility]  Condition at discharge: Stable    Consultations This Hospital Stay   PULMONARY IP CONSULT    Discharge Orders     Discharge Medications   Current Discharge Medication List      CONTINUE these medications which have NOT CHANGED    Details   acetylcysteine (MUCOMYST) 20 % neb solution Take 2 mLs by nebulization 2 times daily  Qty: 100 mL, Refills: 1    Associated Diagnoses: Critical illness myopathy      albuterol (PROVENTIL) (2.5  MG/3ML) 0.083% neb solution Take 1 vial (2.5 mg) by nebulization every 2 hours as needed for shortness of breath  Qty: 90 mL, Refills: 1    Associated Diagnoses: Critical illness myopathy      apixaban ANTICOAGULANT (ELIQUIS) 5 MG tablet 1 tablet (5 mg) by Oral or Feeding Tube route 2 times daily  Qty: 60 tablet, Refills: 1    Associated Diagnoses: Critical illness myopathy      calcium carbonate (TUMS) 500 MG chewable tablet Take 1-2 chew tab by mouth 4 times daily as needed for heartburn      carboxymethylcellulose PF (REFRESH PLUS) 0.5 % ophthalmic solution Place 1 drop into both eyes 3 times daily  Qty: 50 each, Refills: 1    Associated Diagnoses: Critical illness myopathy      famotidine (PEPCID) 20 MG tablet Take 20 mg by mouth 2 times daily as needed (heartburn)      folic acid 5 MG/ML injection Inject 0.2 mLs (1 mg) into the vein daily  Qty: 50 mL, Refills: 1    Associated Diagnoses: Critical illness myopathy      glucose 40 % (400 mg/mL) gel Take 15-30 g by mouth every 15 minutes as needed for low blood sugar  Qty: 100 mL, Refills: 1    Associated Diagnoses: Critical illness myopathy      hydrocortisone sodium succinate PF (SOLU-CORTEF) 100 MG injection Inject 0.5 mLs (25 mg) into the vein every 6 hours  Qty: 100 mL, Refills: 1    Associated Diagnoses: Critical illness myopathy      insulin glargine (LANTUS PEN) 100 UNIT/ML pen Inject 30 Units Subcutaneous every morning (before breakfast)  Qty: 15 mL, Refills: 1    Comments: If Lantus is not covered by insurance, may substitute Basaglar or Semglee or other insulin glargine product per insurance preference at same dose and frequency.    Associated Diagnoses: Critical illness myopathy      ipratropium - albuterol 0.5 mg/2.5 mg/3 mL (DUONEB) 0.5-2.5 (3) MG/3ML neb solution Take 1 vial (3 mLs) by nebulization 4 times daily  Qty: 90 mL, Refills: 1    Associated Diagnoses: Critical illness myopathy      lactulose (CHRONULAC) 10 GM/15ML solution 30 mLs (20 g) by  Oral or Feeding Tube route 3 times daily  Qty: 100 mL, Refills: 1    Associated Diagnoses: Critical illness myopathy      midodrine (PROAMATINE) 10 MG tablet 1 tablet (10 mg) by Oral or Feeding Tube route 3 times daily (with meals)  Qty: 100 tablet, Refills: 1    Associated Diagnoses: Critical illness myopathy      Multiple Vitamins-Minerals (MULTIVITAMINS W/MINERALS) liquid 15 mLs by Per Feeding Tube route daily  Qty: 100 mL, Refills: 1    Associated Diagnoses: Critical illness myopathy      nystatin (MYCOSTATIN) 332872 UNIT/ML suspension Swish and swallow 5 mLs (500,000 Units) in mouth 4 times daily  Qty: 100 mL, Refills: 1    Associated Diagnoses: Critical illness myopathy      ondansetron (ZOFRAN ODT) 4 MG ODT tab Take 1 tablet (4 mg) by mouth every 6 hours as needed for nausea or vomiting  Qty: 30 tablet, Refills: 1    Associated Diagnoses: Critical illness myopathy      !! order for DME Equipment being ordered: Compression knee high stockings for B LE lymphedema 20-30mmHg  Qty: 1 each, Refills: 0    Associated Diagnoses: Lymphedema of both lower extremities      !! order for DME Equipment being ordered: Class 2 (30-40mmHg) compression knee high stockings, night time knee high compression alternative, bandaging supplies  Qty: 1 each, Refills: 0    Associated Diagnoses: Lymphedema of both lower extremities      oxyCODONE (ROXICODONE) 5 MG tablet 1 tablet (5 mg) by Per Feeding Tube route every 4 hours as needed for moderate pain (4-6)  Qty: 60 tablet, Refills: 0    Associated Diagnoses: Critical illness myopathy      pantoprazole (PROTONIX) 2 mg/mL SUSP suspension 20 mLs (40 mg) by Per Feeding Tube route every morning (before breakfast)  Qty: 100 mL, Refills: 1    Associated Diagnoses: Critical illness myopathy      rifaximin (XIFAXAN) 550 MG TABS tablet 1 tablet (550 mg) by Per Feeding Tube route 2 times daily  Qty: 60 tablet, Refills: 1    Associated Diagnoses: Critical illness myopathy      tacrolimus (GENERIC  EQUIVALENT) 1 mg/mL suspension 1 mL (1 mg) by Oral or Feeding Tube route 2 times daily  Qty: 100 mL, Refills: 4    Associated Diagnoses: Critical illness myopathy       !! - Potential duplicate medications found. Please discuss with provider.        Allergies   No Known Allergies  Data

## 2022-12-29 NOTE — PLAN OF CARE
Goal Outcome Evaluation:      Plan of Care Reviewed With: patient, spouse    Overall Patient Progress: improvingOverall Patient Progress: improving     Pt well today. On pasey tay valve with speech and RT for 25 min this morning then tolerated PS 10/5 until tx to LTACH. Mediated for pain with PRN oxycodone and scheduled tylenol. Pt picked up by EMS and tx out at 1300.

## 2022-12-30 ENCOUNTER — APPOINTMENT (OUTPATIENT)
Dept: PHYSICAL THERAPY | Facility: CLINIC | Age: 57
DRG: 207 | End: 2022-12-30
Attending: HOSPITALIST
Payer: COMMERCIAL

## 2022-12-30 ENCOUNTER — APPOINTMENT (OUTPATIENT)
Dept: SPEECH THERAPY | Facility: CLINIC | Age: 57
DRG: 207 | End: 2022-12-30
Attending: HOSPITALIST
Payer: COMMERCIAL

## 2022-12-30 ENCOUNTER — APPOINTMENT (OUTPATIENT)
Dept: OCCUPATIONAL THERAPY | Facility: CLINIC | Age: 57
DRG: 207 | End: 2022-12-30
Attending: HOSPITALIST
Payer: COMMERCIAL

## 2022-12-30 VITALS
HEART RATE: 85 BPM | DIASTOLIC BLOOD PRESSURE: 94 MMHG | SYSTOLIC BLOOD PRESSURE: 139 MMHG | RESPIRATION RATE: 21 BRPM | TEMPERATURE: 99.5 F | BODY MASS INDEX: 28.7 KG/M2 | WEIGHT: 211.64 LBS | OXYGEN SATURATION: 100 %

## 2022-12-30 PROBLEM — J96.20 RESPIRATORY FAILURE, ACUTE-ON-CHRONIC (H): Status: ACTIVE | Noted: 2022-12-30

## 2022-12-30 LAB
ALBUMIN SERPL BCG-MCNC: 2.2 G/DL (ref 3.5–5.2)
ANION GAP SERPL CALCULATED.3IONS-SCNC: 10 MMOL/L (ref 7–15)
BACTERIA PLR CULT: ABNORMAL
BUN SERPL-MCNC: 61 MG/DL (ref 6–20)
CALCIUM SERPL-MCNC: 8.1 MG/DL (ref 8.6–10)
CHLORIDE SERPL-SCNC: 102 MMOL/L (ref 98–107)
CREAT SERPL-MCNC: 3.02 MG/DL (ref 0.67–1.17)
DEPRECATED HCO3 PLAS-SCNC: 30 MMOL/L (ref 22–29)
FERRITIN SERPL-MCNC: 426 NG/ML (ref 31–409)
GFR SERPL CREATININE-BSD FRML MDRD: 23 ML/MIN/1.73M2
GLUCOSE BLDC GLUCOMTR-MCNC: 130 MG/DL (ref 70–99)
GLUCOSE BLDC GLUCOMTR-MCNC: 132 MG/DL (ref 70–99)
GLUCOSE BLDC GLUCOMTR-MCNC: 134 MG/DL (ref 70–99)
GLUCOSE BLDC GLUCOMTR-MCNC: 146 MG/DL (ref 70–99)
GLUCOSE BLDC GLUCOMTR-MCNC: 153 MG/DL (ref 70–99)
GLUCOSE BLDC GLUCOMTR-MCNC: 160 MG/DL (ref 70–99)
GLUCOSE SERPL-MCNC: 134 MG/DL (ref 70–99)
IRON BINDING CAPACITY (ROCHE): 104 UG/DL (ref 240–430)
IRON SATN MFR SERPL: 22 % (ref 15–46)
IRON SERPL-MCNC: 23 UG/DL (ref 61–157)
PHOSPHATE SERPL-MCNC: 3.1 MG/DL (ref 2.5–4.5)
POTASSIUM SERPL-SCNC: 3.4 MMOL/L (ref 3.4–5.3)
POTASSIUM SERPL-SCNC: 4.1 MMOL/L (ref 3.4–5.3)
SODIUM SERPL-SCNC: 142 MMOL/L (ref 136–145)

## 2022-12-30 PROCEDURE — 250N000009 HC RX 250: Performed by: NURSE PRACTITIONER

## 2022-12-30 PROCEDURE — 634N000001 HC RX 634: Performed by: PHYSICIAN ASSISTANT

## 2022-12-30 PROCEDURE — 92610 EVALUATE SWALLOWING FUNCTION: CPT | Mod: GN | Performed by: SPEECH-LANGUAGE PATHOLOGIST

## 2022-12-30 PROCEDURE — 250N000013 HC RX MED GY IP 250 OP 250 PS 637: Performed by: PHYSICIAN ASSISTANT

## 2022-12-30 PROCEDURE — 97530 THERAPEUTIC ACTIVITIES: CPT | Mod: GO | Performed by: OCCUPATIONAL THERAPIST

## 2022-12-30 PROCEDURE — 84132 ASSAY OF SERUM POTASSIUM: CPT | Performed by: HOSPITALIST

## 2022-12-30 PROCEDURE — 999N000157 HC STATISTIC RCP TIME EA 10 MIN

## 2022-12-30 PROCEDURE — 99222 1ST HOSP IP/OBS MODERATE 55: CPT | Performed by: PHYSICIAN ASSISTANT

## 2022-12-30 PROCEDURE — 97535 SELF CARE MNGMENT TRAINING: CPT | Mod: GO | Performed by: OCCUPATIONAL THERAPIST

## 2022-12-30 PROCEDURE — 82728 ASSAY OF FERRITIN: CPT | Performed by: PHYSICIAN ASSISTANT

## 2022-12-30 PROCEDURE — 94003 VENT MGMT INPAT SUBQ DAY: CPT

## 2022-12-30 PROCEDURE — 92597 ORAL SPEECH DEVICE EVAL: CPT | Mod: GN | Performed by: SPEECH-LANGUAGE PATHOLOGIST

## 2022-12-30 PROCEDURE — 250N000011 HC RX IP 250 OP 636: Performed by: HOSPITALIST

## 2022-12-30 PROCEDURE — 90935 HEMODIALYSIS ONE EVALUATION: CPT

## 2022-12-30 PROCEDURE — 99233 SBSQ HOSP IP/OBS HIGH 50: CPT | Performed by: HOSPITALIST

## 2022-12-30 PROCEDURE — 94640 AIRWAY INHALATION TREATMENT: CPT

## 2022-12-30 PROCEDURE — 80069 RENAL FUNCTION PANEL: CPT | Performed by: HOSPITALIST

## 2022-12-30 PROCEDURE — 83550 IRON BINDING TEST: CPT | Performed by: PHYSICIAN ASSISTANT

## 2022-12-30 PROCEDURE — 97110 THERAPEUTIC EXERCISES: CPT | Mod: GP | Performed by: PHYSICAL THERAPIST

## 2022-12-30 PROCEDURE — 97112 NEUROMUSCULAR REEDUCATION: CPT | Mod: GP | Performed by: PHYSICAL THERAPIST

## 2022-12-30 PROCEDURE — 120N000017 HC R&B RESPIRATORY CARE

## 2022-12-30 PROCEDURE — 250N000013 HC RX MED GY IP 250 OP 250 PS 637: Performed by: HOSPITALIST

## 2022-12-30 PROCEDURE — 999N000009 HC STATISTIC AIRWAY CARE

## 2022-12-30 PROCEDURE — 94640 AIRWAY INHALATION TREATMENT: CPT | Mod: 76

## 2022-12-30 PROCEDURE — 250N000009 HC RX 250: Performed by: HOSPITALIST

## 2022-12-30 PROCEDURE — 258N000003 HC RX IP 258 OP 636: Performed by: HOSPITALIST

## 2022-12-30 PROCEDURE — 97530 THERAPEUTIC ACTIVITIES: CPT | Mod: GP | Performed by: PHYSICAL THERAPIST

## 2022-12-30 PROCEDURE — 92523 SPEECH SOUND LANG COMPREHEN: CPT | Mod: GN | Performed by: SPEECH-LANGUAGE PATHOLOGIST

## 2022-12-30 PROCEDURE — G0463 HOSPITAL OUTPT CLINIC VISIT: HCPCS

## 2022-12-30 PROCEDURE — 250N000012 HC RX MED GY IP 250 OP 636 PS 637: Performed by: HOSPITALIST

## 2022-12-30 PROCEDURE — 5A1D70Z PERFORMANCE OF URINARY FILTRATION, INTERMITTENT, LESS THAN 6 HOURS PER DAY: ICD-10-PCS | Performed by: PHYSICIAN ASSISTANT

## 2022-12-30 PROCEDURE — 258N000003 HC RX IP 258 OP 636: Performed by: PHYSICIAN ASSISTANT

## 2022-12-30 PROCEDURE — 97166 OT EVAL MOD COMPLEX 45 MIN: CPT | Mod: GO | Performed by: OCCUPATIONAL THERAPIST

## 2022-12-30 PROCEDURE — 999N000253 HC STATISTIC WEANING TRIALS

## 2022-12-30 PROCEDURE — 97162 PT EVAL MOD COMPLEX 30 MIN: CPT | Mod: GP | Performed by: PHYSICAL THERAPIST

## 2022-12-30 PROCEDURE — 94003 VENT MGMT INPAT SUBQ DAY: CPT | Performed by: NURSE PRACTITIONER

## 2022-12-30 PROCEDURE — 250N000011 HC RX IP 250 OP 636: Performed by: PHYSICIAN ASSISTANT

## 2022-12-30 RX ORDER — LIDOCAINE HYDROCHLORIDE 20 MG/ML
JELLY TOPICAL EVERY 4 HOURS PRN
Status: DISCONTINUED | OUTPATIENT
Start: 2022-12-30 | End: 2023-01-21 | Stop reason: HOSPADM

## 2022-12-30 RX ORDER — POTASSIUM CHLORIDE 1.5 G/1.58G
40 POWDER, FOR SOLUTION ORAL ONCE
Status: COMPLETED | OUTPATIENT
Start: 2022-12-30 | End: 2022-12-30

## 2022-12-30 RX ORDER — LIDOCAINE HYDROCHLORIDE 20 MG/ML
JELLY TOPICAL
Status: COMPLETED | OUTPATIENT
Start: 2022-12-30 | End: 2022-12-30

## 2022-12-30 RX ORDER — AMOXICILLIN AND CLAVULANATE POTASSIUM 400; 57 MG/5ML; MG/5ML
500 POWDER, FOR SUSPENSION ORAL EVERY EVENING
Status: DISCONTINUED | OUTPATIENT
Start: 2022-12-30 | End: 2023-01-09 | Stop reason: CLARIF

## 2022-12-30 RX ORDER — MINERAL OIL/HYDROPHIL PETROLAT
OINTMENT (GRAM) TOPICAL DAILY
Status: DISCONTINUED | OUTPATIENT
Start: 2022-12-30 | End: 2023-01-21 | Stop reason: HOSPADM

## 2022-12-30 RX ORDER — AMOXICILLIN AND CLAVULANATE POTASSIUM 400; 57 MG/5ML; MG/5ML
500 POWDER, FOR SUSPENSION ORAL
Status: DISCONTINUED | OUTPATIENT
Start: 2023-01-02 | End: 2023-01-10

## 2022-12-30 RX ORDER — TORSEMIDE 100 MG/1
100 TABLET ORAL DAILY
Status: DISCONTINUED | OUTPATIENT
Start: 2022-12-30 | End: 2023-01-02

## 2022-12-30 RX ADMIN — HEPARIN SODIUM 1900 UNITS: 1000 INJECTION INTRAVENOUS; SUBCUTANEOUS at 14:31

## 2022-12-30 RX ADMIN — Medication 5 ML: at 09:19

## 2022-12-30 RX ADMIN — AMOXICILLIN AND CLAVULANATE POTASSIUM 500 MG: 400; 57 POWDER, FOR SUSPENSION ORAL at 21:22

## 2022-12-30 RX ADMIN — IPRATROPIUM BROMIDE AND ALBUTEROL SULFATE 3 ML: 2.5; .5 SOLUTION RESPIRATORY (INHALATION) at 07:02

## 2022-12-30 RX ADMIN — ACETYLCYSTEINE 2 ML: 200 SOLUTION ORAL; RESPIRATORY (INHALATION) at 20:35

## 2022-12-30 RX ADMIN — ACETYLCYSTEINE 2 ML: 200 SOLUTION ORAL; RESPIRATORY (INHALATION) at 07:02

## 2022-12-30 RX ADMIN — SODIUM CHLORIDE 250 ML: 9 INJECTION, SOLUTION INTRAVENOUS at 14:29

## 2022-12-30 RX ADMIN — IPRATROPIUM BROMIDE AND ALBUTEROL SULFATE 3 ML: 2.5; .5 SOLUTION RESPIRATORY (INHALATION) at 15:19

## 2022-12-30 RX ADMIN — INSULIN ASPART 1 UNITS: 100 INJECTION, SOLUTION INTRAVENOUS; SUBCUTANEOUS at 00:21

## 2022-12-30 RX ADMIN — CHLORHEXIDINE GLUCONATE 0.12% ORAL RINSE 15 ML: 1.2 LIQUID ORAL at 09:19

## 2022-12-30 RX ADMIN — CHLORHEXIDINE GLUCONATE 0.12% ORAL RINSE 15 ML: 1.2 LIQUID ORAL at 20:52

## 2022-12-30 RX ADMIN — LACOSAMIDE 100 MG: 10 SOLUTION ORAL at 09:19

## 2022-12-30 RX ADMIN — INSULIN ASPART 1 UNITS: 100 INJECTION, SOLUTION INTRAVENOUS; SUBCUTANEOUS at 12:09

## 2022-12-30 RX ADMIN — Medication 40 MG: at 21:17

## 2022-12-30 RX ADMIN — Medication 1 DROP: at 09:20

## 2022-12-30 RX ADMIN — APIXABAN 5 MG: 2.5 TABLET, FILM COATED ORAL at 20:52

## 2022-12-30 RX ADMIN — MICAFUNGIN SODIUM 100 MG: 100 INJECTION, POWDER, LYOPHILIZED, FOR SOLUTION INTRAVENOUS at 17:35

## 2022-12-30 RX ADMIN — NYSTATIN 500000 UNITS: 100000 SUSPENSION ORAL at 12:08

## 2022-12-30 RX ADMIN — LACTULOSE 20 G: 20 SOLUTION ORAL at 16:53

## 2022-12-30 RX ADMIN — ACETAMINOPHEN 975 MG: 325 TABLET, FILM COATED ORAL at 16:54

## 2022-12-30 RX ADMIN — TACROLIMUS 1 MG: 5 CAPSULE ORAL at 09:19

## 2022-12-30 RX ADMIN — Medication 40 MG: at 09:19

## 2022-12-30 RX ADMIN — WHITE PETROLATUM: 1.75 OINTMENT TOPICAL at 17:02

## 2022-12-30 RX ADMIN — SODIUM CHLORIDE 300 ML: 9 INJECTION, SOLUTION INTRAVENOUS at 13:40

## 2022-12-30 RX ADMIN — LACTULOSE 20 G: 20 SOLUTION ORAL at 09:20

## 2022-12-30 RX ADMIN — IPRATROPIUM BROMIDE AND ALBUTEROL SULFATE 3 ML: 2.5; .5 SOLUTION RESPIRATORY (INHALATION) at 20:35

## 2022-12-30 RX ADMIN — NYSTATIN 500000 UNITS: 100000 SUSPENSION ORAL at 20:50

## 2022-12-30 RX ADMIN — MIDODRINE HYDROCHLORIDE 10 MG: 5 TABLET ORAL at 09:20

## 2022-12-30 RX ADMIN — NYSTATIN 500000 UNITS: 100000 SUSPENSION ORAL at 16:55

## 2022-12-30 RX ADMIN — LIDOCAINE HYDROCHLORIDE: 20 JELLY TOPICAL at 22:50

## 2022-12-30 RX ADMIN — EPOETIN ALFA-EPBX 10000 UNITS: 10000 INJECTION, SOLUTION INTRAVENOUS; SUBCUTANEOUS at 17:15

## 2022-12-30 RX ADMIN — RIFAXIMIN 550 MG: 550 TABLET ORAL at 20:52

## 2022-12-30 RX ADMIN — Medication 1 DROP: at 16:53

## 2022-12-30 RX ADMIN — APIXABAN 5 MG: 2.5 TABLET, FILM COATED ORAL at 09:20

## 2022-12-30 RX ADMIN — Medication 1 DROP: at 20:52

## 2022-12-30 RX ADMIN — IPRATROPIUM BROMIDE AND ALBUTEROL SULFATE 3 ML: 2.5; .5 SOLUTION RESPIRATORY (INHALATION) at 11:05

## 2022-12-30 RX ADMIN — INSULIN ASPART 1 UNITS: 100 INJECTION, SOLUTION INTRAVENOUS; SUBCUTANEOUS at 03:39

## 2022-12-30 RX ADMIN — ACETAMINOPHEN 975 MG: 325 TABLET, FILM COATED ORAL at 05:36

## 2022-12-30 RX ADMIN — PREDNISONE 2.5 MG: 5 SOLUTION ORAL at 09:19

## 2022-12-30 RX ADMIN — POTASSIUM CHLORIDE 40 MEQ: 1.5 POWDER, FOR SOLUTION ORAL at 09:49

## 2022-12-30 RX ADMIN — TACROLIMUS 1 MG: 5 CAPSULE ORAL at 17:15

## 2022-12-30 RX ADMIN — FOLIC ACID 1 MG: 1 TABLET ORAL at 09:20

## 2022-12-30 RX ADMIN — ACETAMINOPHEN 975 MG: 325 TABLET, FILM COATED ORAL at 21:17

## 2022-12-30 RX ADMIN — LACTULOSE 20 G: 20 SOLUTION ORAL at 20:49

## 2022-12-30 RX ADMIN — NYSTATIN 500000 UNITS: 100000 SUSPENSION ORAL at 09:19

## 2022-12-30 RX ADMIN — MIDODRINE HYDROCHLORIDE 10 MG: 5 TABLET ORAL at 16:55

## 2022-12-30 RX ADMIN — LACOSAMIDE 100 MG: 10 SOLUTION ORAL at 20:51

## 2022-12-30 RX ADMIN — INSULIN GLARGINE 20 UNITS: 100 INJECTION, SOLUTION SUBCUTANEOUS at 09:23

## 2022-12-30 RX ADMIN — QUETIAPINE FUMARATE 50 MG: 50 TABLET ORAL at 20:52

## 2022-12-30 RX ADMIN — RIFAXIMIN 550 MG: 550 TABLET ORAL at 09:21

## 2022-12-30 RX ADMIN — Medication: at 14:32

## 2022-12-30 RX ADMIN — LEVETIRACETAM 1000 MG: 100 SOLUTION ORAL at 20:50

## 2022-12-30 RX ADMIN — AMPICILLIN SODIUM AND SULBACTAM SODIUM 3 G: 2; 1 INJECTION, POWDER, FOR SOLUTION INTRAMUSCULAR; INTRAVENOUS at 05:36

## 2022-12-30 RX ADMIN — TORSEMIDE 100 MG: 100 TABLET ORAL at 16:55

## 2022-12-30 RX ADMIN — MIDODRINE HYDROCHLORIDE 10 MG: 5 TABLET ORAL at 12:09

## 2022-12-30 ASSESSMENT — ACTIVITIES OF DAILY LIVING (ADL)
ADLS_ACUITY_SCORE: 60
ADLS_ACUITY_SCORE: 62
ADLS_ACUITY_SCORE: 60
ADLS_ACUITY_SCORE: 60
ADLS_ACUITY_SCORE: 62
ADLS_ACUITY_SCORE: 60
ADLS_ACUITY_SCORE: 60
ADLS_ACUITY_SCORE: 62
ADLS_ACUITY_SCORE: 50
ADLS_ACUITY_SCORE: 62

## 2022-12-30 NOTE — CONSULTS
[facility]  WOC Nurse Inpatient Assessment     Consulted for: trach site, peg tube site    Patient History (according to provider note(s):      Per ICU H+P at previous facility:  57 year old male with paroxysmal A. fib, Liver transplant (2016), CHRISTIAN on BiPAP, h/o CVA and hypertension. Patient  s/p Trach/PEG following acu had Rt sided Chest tube placed for hydroptx on 12/12/22 and  transferred to [place] on 12/14/22 for CIP.  Pt  noted to have bloody stool on 12/15 and 12/16 and had left Ptx which required left sided chest tube leading to ~900ml of bloody output. Patient with PEA arrest 12/20/22 and then seizure while on HD.  Overall stable and getting close to being ready for transfer back to LTAC.  The following problems were addressed during his hospitalization listed by organ system.     Areas Assessed:      Areas visualized during today's visit: Complete head to toe     Skin Injury Location: presumed concern at PEG tube site  Last photo: NA  Mepilex under bumper and then tegaderm covering Mepilex and bumper  WOC nurse did not remove as wanted to review chart first to be sure there was not a concern with tube placement/securement  Per chart, orders from previous WO nurse were to use a Mepilex under bumper, no order for Tegaderm, and use stabilization, which is not in place  Will update orders to include Mepilex and securement device    Trach site with ecchymosis and scabs, no concerns, continue Routine cares     Patient has copious amount of ecchymosis on upper body, including shoulders and chest and back, left arm/elbow, purpura noted on abdomen    Interdry to moist groin folds    Dry skin feet and hands - ordered Aquaphor    Attention to Internal Fecal Management System   6 o'clock perianal minor denudement - start Viscopaste  Bypassing on assessment - nurse udated   Internal Fecal Management System Instructions: Record output and inspect skin at anal opening every shift. **Discontinue  Internal Fecal Management System  if less than 200cc of output in a 24 hour period.**    Treatment Plan:     See above    Orders: Written    RECOMMEND PRIMARY TEAM ORDER: None, at this time  Education provided: plan of care  Discussed plan of care with: Patient, Family and Nurse  WOC nurse follow-up plan: weekly  Notify WOC if wound(s) deteriorate.  Nursing to notify the Provider(s) and re-consult the WOC Nurse if new skin concern.    DATA:     Current support surface: Standard  Low air loss (MIQUEL pump, Isolibrium, Pulsate, skin guard, etc)  Containment of urine/stool: Internal fecal management  BMI: Body mass index is 27.87 kg/m .   Active diet order: None     Output: I/O last 3 completed shifts:  In: 600 [I.V.:130; NG/GT:470]  Out: 1050 [Stool:1050]     Labs: Recent Labs   Lab 12/30/22  0651 12/28/22  0428 12/27/22  0420   ALBUMIN 2.2*   < > 2.1*   HGB  --   --  8.8*   WBC  --   --  7.5    < > = values in this interval not displayed.     Pressure injury risk assessment:   Sensory Perception: 4-->no impairment  Moisture: 3-->occasionally moist  Activity: 1-->bedfast  Mobility: 2-->very limited  Nutrition: 3-->adequate  Friction and Shear: 2-->potential problem  Kareem Score: 15    Ashleigh Pereira, ANANDN, RN, PHN, HNB-BC, CWOCN

## 2022-12-30 NOTE — PLAN OF CARE
Problem: Mechanical Ventilation Invasive  Goal: Effective Communication  Outcome: Progressing  Goal: Optimal Device Function  Outcome: Progressing  Goal: Mechanical Ventilation Liberation  Outcome: Progressing  Goal: Absence of Device-Related Skin and Tissue Injury  Outcome: Progressing  Goal: Absence of Ventilator-Induced Lung Injury  Outcome: Progressing   RT PROGRESS NOTE     DATA:     CURRENT SETTINGS:             TRACH TYPE / SIZE: # shiley XLT proximal 11/29             MODE:   AC 12/450 +5              FIO2:   30%     ACTION:             THERAPIES:   MUCO BID and DUONEB QID             SUCTION:                           FREQUENCY:   2                        AMOUNT:   small                         CONSISTENCY:   thick                         COLOR:   tan             SPONTANEOUS COUGH EFFORT/STRENGTH OF EFFORT (not elicited by suctioning):                               WEANING PHASE:   1                        WEAN MODE:    ps 12/5                        WEAN TIME:  wean 9hrs yesterday( placed back on vent after midnight)                        END WEAN REASON:   full vent at night     RESPONSE:             BS:   cl             VITAL SIGNS:   Blood pressure 126/73, pulse 91, temperature 97.5  F (36.4  C), temperature source Oral, resp. rate 21, weight 93.2 kg (205 lb 8 oz), SpO2 99 %.               EMOTIONAL NEEDS / CONCERNS:                  RISK FOR SELF DECANNULATION:                          RISK DUE TO:                          INTERVENTION/S IN PLACE IS/ARE:         NOTE / PLAN:  new admit of yesterday, pt tolerated  pressure support  well yesterday. Cont to monitor.

## 2022-12-30 NOTE — PROGRESS NOTES
12/30/22 1005   Appointment Info   Signing Clinician's Name / Credentials (OT) Yandel Fine, OTRL/CBIS   Living Environment   People in Home spouse   Current Living Arrangements house   Home Accessibility no concerns   Transportation Anticipated health plan transportation;family or friend will provide   Living Environment Comments No adaptive equip in the home. Stairs in the home tomanage.   Self-Care   Usual Activity Tolerance excellent   Current Activity Tolerance poor   Regular Exercise Yes   Activity/Exercise Type   (variety of exerc in the home)   Exercise Amount/Frequency daily   Activity/Exercise/Self-Care Comment Pt worked full time from home prior hospitalization. Was IND with all mobility without AD. 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   Instrumental Activities of Daily Living (IADL)   IADL Comments Per pt, sahred responsibility at home. Was IN Dw/ home functional moblity/ADLs and IADLs. Per pt, worked from home.   General Information   Onset of Illness/Injury or Date of Surgery 12/16/22   Referring Physician Edgar Fritz MD   Patient/Family Therapy Goal Statement (OT) get stronger   Existing Precautions/Restrictions fall   Left Upper Extremity (Weight-bearing Status) full weight-bearing (FWB)   Right Upper Extremity (Weight-bearing Status) full weight-bearing (FWB)   Left Lower Extremity (Weight-bearing Status) full weight-bearing (FWB)   Right Lower Extremity (Weight-bearing Status) full weight-bearing (FWB)   General Observations and Info Pr presetning w/ decreased activity tolerance and general weakness for daily tasks. Pt is motivated and eger to partic. Noting poor orientation/recall however following directions well.   Cognitive Status Examination   Orientation Status person   Follows Commands follows one-step commands;delayed response/completion;over 90% accuracy   Memory Deficit short-term memory   Visual Perception   Visual Impairment/Limitations corrective lenses for  reading   Sensory   Sensory Quick Adds sensation intact   Pain Assessment   Patient Currently in Pain No   Range of Motion Comprehensive   Comment, General Range of Motion PROM WFL BUE   Strength Comprehensive (MMT)   Comment, General Manual Muscle Testing (MMT) Assessment BUE MMT grossly 2-/5, RUE greater than LUE   Muscle Tone Assessment   Muscle Tone Quick Adds No deficits were identified   Bed Mobility   Bed Mobility rolling left;supine-sit;sit-supine   Rolling Left [place] (Bed Mobility) moderate assist (50% patient effort)   Supine-Sit [place] (Bed Mobility) maximum assist (25% patient effort);moderate assist (50% patient effort)   Sit-Supine [place] (Bed Mobility) moderate assist (50% patient effort)   Balance   Balance Comments static sitting balance - min A/CGA prabhjot in midline   Lower Body Dressing Assessment/Training   [place] Level (Lower Body Dressing) dependent (less than 25% patient effort)   Clinical Impression   Criteria for Skilled Therapeutic Interventions Met (OT) Yes, treatment indicated   OT Diagnosis decreased ADL and bed moblity IND   OT Problem List-Impairments impacting ADL problems related to;activity tolerance impaired;balance;cognition;mobility;strength   Assessment of Occupational Performance 3-5 Performance Deficits   Identified Performance Deficits dressing, toileting, bathing, IADLs, functional mobility   Planned Therapy Interventions (OT) ADL retraining;balance training;bed mobility training;cognition;strengthening;transfer training;progressive activity/exercise   Clinical Decision Making Complexity (OT) moderate complexity   Risk & Benefits of therapy have been explained evaluation/treatment results reviewed;care plan/treatment goals reviewed;patient   Clinical Impression Comments Pt presenting below baseline for ADL and functional moblity. Pt limited by general weakness, fatigue and resp status. Pt motivated and engaged in partic. Approp for skilled OT to  address these areas.   OT Total Evaluation Time   OT Eval, Moderate Complexity Minutes (77406) 10   OT Goals   Therapy Frequency (OT) 5 times/wk   OT Predicted Duration/Target Date for Goal Attainment 02/10/23   OT Goals Hygiene/Grooming;Upper Body Dressing;Lower Body Dressing;Upper Body Bathing;Bed Mobility;Toilet Transfer/Toileting;Cognition;OT Goal 1   OT: Hygiene/Grooming minimal assist;from wheelchair   OT: Upper Body Dressing Minimal assist;from wheelchair   OT: Lower Body Dressing Moderate assist   OT: Upper Body Bathing Supervision/stand-by assist   OT: Bed Mobility Supervision/stand-by assist   OT: Toilet Transfer/Toileting Moderate assist   OT: Cognitive Patient/caregiver will verbalize understanding of cognitive assessment results/recommendations as needed for safe discharge planning   OT: Goal 1 Pt will participate in 10-12 mins of UE ex to increase ROM, strength, coordination in prep for ADLs   Interventions   Interventions Quick Adds Self-Care/Home Management   Self-Care/Home Management   Self-Care/Home Mgmt/ADL, Compensatory, Meal Prep Minutes (55532) 11   Symptoms Noted During/After Treatment (Meal Preparation/Planning Training) fatigue   Treatment Detail/Skilled Intervention Facilitated grooming tasks to increase activity tolerance and IND. Pt able to grasp and maintain grasp on grooming itmes. TH did provide support at eoblw to maintain reach to face level. Mod fatigue with activities. Supine<>sit mod A w/ HOB elelvated. Tolerated sittign EOB w/min A-CGA. Tolerated sitting ~2-3 minutes w/ mild dizziness which did esolve over time.   [place] Level (Grooming Training) maximum assist (25% patient effort)   Assistance (Grooming Training) 1 person assist   Therapeutic Activities   Therapeutic Activity Minutes (61132) 9   Symptoms noted during/after treatment fatigue   Treatment Detail/Skilled Intervention OT: Pt partic w/ BUE AAROM x3 exerc/x6 reps each. Rest breaks between each set. Fair  toleration of exerc. Mod fatigue with exerc.   OT Discharge Planning   OT Plan 12/30 vent weaning  Tx: sitting EOB, ADL w/progerssion to EOB completion, activity tolerance uilding, BUE strengtheing, grasp/release exerc, ACE, orientation review   OT Discharge Recommendation (DC Rec) Transitional Care Facility   OT Rationale for DC Rec Pt significantly below baseline for ADL performance. Limited by O2 needs, significant deconditioning, weakness from prolonged LTACH, ICU stay. Has supportive family and motivated to improve skills.   OT Brief overview of current status OT:orders for eval /tx- completed and tx indicated. Pt presenting below baseline for ADL and functional moblity. Pt limited by general weakness, fatigue and resp status. Pt motivated and engaged in partic. Approp for skilled OT to address these areas.   Total Session Time   Timed Code Treatment Minutes 20   Total Session Time (sum of timed and untimed services) 30   Post Acute Settings Only   What unit is patient on? LTACH   Hearing, Vision, and Speech: Expression    Expression of Ideas and Wants Some difficulty   Hearing, Vision, and Speech: Understanding   Understanding Verbal/Non-Verbal Content Usually understands   Eating   Reason if not Attempted Medical concerns   Oral Hygiene   Patient Performance Partial/moderate assist   Wash Upper Body   Patient Performance Substantial/maximal assist   Toileting Hygiene   Patient Performance Dependent   Toilet Transfer   Patient Performance Dependent

## 2022-12-30 NOTE — PROGRESS NOTES
Speech Language Therapy Discharge Summary    Reason for therapy discharge:    Discharged to LTACH.    Progress towards therapy goal(s). See goals on Care Plan in [facility] electronic health record for goal details.  Goals partially met.  Barriers to achieving goals:   discharge from facility.    Therapy recommendation(s):    Continued therapy is recommended.  Rationale/Recommendations: At the time of discharge, PMSV tolerated well for 23-25 minute trial while pt was on pressure support.  Whisper to hoarse voicing was achieved.  Rec: PMSV with SLP and RT supervision only at this time.    Patient remained NPO at discharge.  Recommend communication and dysphagia intervention, as indicated.

## 2022-12-30 NOTE — PROGRESS NOTES
[facility]    Medicine Progress Note - Hospitalist Service    Date of Admission:  12/29/2022    History of Present Illness  Blanco Osborne is an 57 year old male who is transferred from [facility] for rehab and continuation of IV antibiotic treatment.  Patient has multiple medical problems including history of liver transplant 2016, PAF on chronic anticoagulation, history of DM2, CVA, HTN, CHRISTIAN on BiPAP, and history of alcohol abuse in the past causing liver damage ending with liver transplant.  About 6 weeks ago he had respiratory arrest and was diagnosed with infection with parainfluenza and strep pneumoniae and acute on chronic renal insufficiency ending with CRF needing 3/week hemodialysis.  During this period he was diagnosed with cirrhosis of transplanted liver and hyperammonia.  Currently he is on Lactulose and Rifaximin.        On 12/14/22, due to CIP, he was transferred to [place] [facility] for the first time with Trach, PEG and Rt chest tube.    On 12/16/2022 patient was transferred back to [facility] due to respiratory insufficiency secondary to hydropneumothorax and drainage of 900 cc bloody pleural effusion, bloody stool and transfusion of 2 units PRBC.      On 12/20/2022 he had another episode of PEA arrest followed by CPR x2 minutes and possibly had seizure activity in [facility]. 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  He has been on ventilator and has improved to trach dome tolerating up to 6 hours so far. He had chest tube which was removed.    He had SBP with growth of lactobacillus and is currently on Unasyn which will be switched to Augmentin orally through 1/12/2023.    Pleural effusion grew Candida and is on micafungin.  He is also on  due to cirrhosis and hyperammonia.  He is anemic and is on Retacrit.   He is on lacosamide and Keppra due to history of seizure.  There is  question of him drinking again causing him cirrhosis of the transplanted liver.      Assessment & Plan   Principal Problem:    Acute on chronic respiratory failure with hypoxia (H)  Active Problems:    Acquired immunocompromised state (H)    Cirrhosis of liver (H)    NAFLD (nonalcoholic fatty liver disease)    Liver transplanted (H)    Immunosuppression (H)    Acute kidney injury (H)    Spontaneous bacterial peritonitis (H)    Critical illness myopathy    History of sudden cardiac arrest, PEA    Dependent on hemodialysis (H)    Pulmonary:  Acute now chronic respiratory failure requiring tracheostomy..  Initial event was parainfluenza and strep pneumo pneumonia.  Had failed extubation x2 and tracheostomy performed last hospitalization.  Had additional complications of bilateral pneumothoraces.  (Most recently was a hydropneumothorax where fluid grew Candida..  Chest tube was placed and removed during this hospitalization.  Had decent tolerance for pressure support and trach dome but after few hours and trach dome 1227 had a pretty significant episode of desaturation.   Plan:  1.  We will alternate trach dome and pressure support and try to wean.  Pulmonary is consulted.   2.  At least 4 weeks of antimicrobial for Candida detailed in ID section    Cardiovascular system:  PEA arrest prior to his previous admission and 1 episode of PEA associated with desaturation on 1220.  No structural cardiac disease but does have A. Fib which neurologist thought might have been contributory to his embolic strokes.  Anticoagulation been restarted with stable labs although high risk for bleeding seconday to thrombocytopenia.  Also has developed baseline hypotension and is on midodrine 10 mg 3 times daily.  Plan:  Continue Eliquis.  Continue current midodrine dose     CNS:  1. Seizure after hypoxic event.  This is in the context of baseline multifactorial encephalopathy secondary to his ongoing critical illness and prior cardiac  arrests and prior strokes and cirrhosis with hepatic encephalopathy. MRI of head of 12/20/22 reveals no PRES or leptomeningeal enhancement but scattered.  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   Plan:  1.  Keppra and vimpat indefinitely.  Neurology follow-up.  2.  Anticoagulation indefinitely for secondary stroke prevention.  3. Tylenol TID for headache.        Renal/Electrolytes:  1. LORETTA, ?CRI: Now on iHD.  No return of renal function.  - MW schedule      ID:  1. 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 4.  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  Have had some conversations within our group and with transplant ID and general infectious disease.  General consensus is that ganciclovir probably could be discontinued  2. H/o Strep/Parainfluenza infection last hospitalization. Completed treatment course  3. 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  4.  Lactobacillus growing in the broth 4 days after paracentesis.  Cell count was very low.  5.  Candida in left pleural fluid cultures.  Sensitivities not resulted.  6.  Immunosuppressed on tacrolimus.  Discussed with transplant service at the [facility] who felt that given the absence of rejection on his most recent biopsy and ongoing issues with infection continuing with 1 twice daily tacrolimus is the best current option.  They have seen his subtherapeutic trough levels.  Plan:  1.  Confirm with transplant ID whether or not we can stop ganciclovir.  2.  Unasyn for 2 weeks total.  Change to Augmentin through 1/12/2023.  3.  At least 4 weeks of antimicrobial for the Candida.  If sensitive to fluconazole could de-escalate from micafungin.  4.  Continue tacrolimus as above and we are tapering steroids     GI/:  Post transplant cirrhosis.  Main manifestations of this are hepatic encephalopathy and ascites.  Hepatologist at [facility] felt that most likely reason for the cirrhosis was metabolic syndrome or alcohol intake.  There was  no evidence of rejection on biopsy and there was some evidence of regeneration. Paracentesis done on 12/22/2022 with lack of bacillus in the broth indicating SBP low cell count was very low  Plan:  1.  Intermittent paracentesis as needed.  2.  Getting 1 mg  tacrolimus BID and tapering steroids   3. Lactulose and Rifaximin as above  4.  Treat SBP for 2 weeks.  5.  He has an appointment in April with Dr. Simms if he is out of the hospital.     Endocrine:  1. DM.  Titrating insulin. He needs supplemental coverage.  2. He is on steroids which we are weaning  Plan:  Continue with current regimen.      Nutrition:  At goal tube feeds via PEG tube     Heme/Onc:  1. Pancytopenia; possibly due cirrhosis. WBC count has improved . Chronic splenomegaly was present prior to liver transplant but has not regressed.   2. Needs anticoagulation for AFib.   Plan:  1.  Continue Eliquis      ICU prophylaxis:      DVT PPX: Eliquis      Restraints needed: No     Stress ulcer: IV PPI     Central line: Needed for IV access/Meds.     Code Status: Full     Diet: Adult Formula Drip Feeding: Continuous Novasource Renal; Gastrostomy; Goal Rate: 60; mL/hr  Adult Formula Drip Feeding: Continuous Novasource Renal; Gastrostomy; Goal Rate: 60; mL/hr; Increase TF now to 50 mL/hr; after 12 hrs increase to goal 60 mL/hr    DVT Prophylaxis: DOAC  Nixon Catheter: Not present  Central Lines: PRESENT  PICC 11/24/22 Triple Lumen Right Brachial vein medial Access-Site Assessment: WDL  CVC Double Lumen Right External jugular Tunneled-Site Assessment: WDL  Cardiac Monitoring: None  Code Status: Full Code      Disposition Plan      Expected Discharge Date: 01/01/2023    Discharge Delays: Complex Discharge    Discharge Comments: Vent. Trach. Phase 1. PEG.  Rectal Tube.        The patient's care was discussed with the Bedside Nurse, Patient and Patient's Family.    Edgar Fritz MD  Hospitalist Service  LTACH  Securely message with the Vocera Web Console (learn  more here)  Text page via [facility] Paging/Directory         Clinically Significant Risk Factors Present on Admission              # Hypoalbuminemia: Lowest albumin = 1.8 g/dL at 12/29/2022  4:40 AM, will monitor as appropriate  # Drug Induced Coagulation Defect: home medication list includes an anticoagulant medication                 ______________________________________________________________________    Interval History   Patient is sitting in his bed and is in no distress.  He has tracheostomy and is on vent.  He is comfortable.  He did not verbalize any concerns or discomfort.  Patient's wife is at bedside and helps with the communication with the patient.  Patient's wife does not have any concerns at this time.  Patient denies any chest pain or shortness of breath or abdominal pain or nausea or vomiting or current headache.    Data reviewed today: I reviewed all medications, new labs and imaging results over the last 24 hours. I personally reviewed no images or EKG's today.    Physical Exam   Vital Signs: Temp: 98.1  F (36.7  C) Temp src: Oral BP: 129/71 Pulse: 93   Resp: 17 SpO2: 100 % O2 Device: Mechanical Ventilator    Weight: 205 lbs 8 oz  Patient is sitting in bed.  Is in no acute distress.  Looks pale and chronically ill.  He communicates and is pleasant and cooperative.  He is on ventilator through tracheostomy  HEENT: Decreased oral mucosal moisture.  Pale mucosa, no icterus.  Neck: Tracheostomy in place.  Right external jugular CVC.  Heart: Regular rate and rhythm, no murmur  Abdomen: Moderately enlarged due to ascites.  Nontender.  No mass or organomegaly noted.  Patient has rectal tube.  PEG tube in place.  : Deferred  Extremities: No edema.  PICC line in right brachial artery.  Neurological exam: Patient is alert and oriented.  Has normal behavior.  Gross cranial nerve neurological exam did not show any major abnormality.  Left  is weaker than the right otherwise no focal neurological deficit  noted in limited neurological exam. Gait and station were not examined.    Data   Recent Labs   Lab 12/30/22  0817 12/30/22  0651 12/30/22  0330 12/29/22  0549 12/29/22  0440 12/28/22  0430 12/28/22  0428 12/27/22  0429 12/27/22  0420 12/26/22  0443 12/26/22  0436 12/24/22  1944 12/24/22  1808   WBC  --   --   --   --   --   --   --   --  7.5  --  4.8  --  5.8   HGB  --   --   --   --   --   --   --   --  8.8*  --  8.1*  --  8.4*   MCV  --   --   --   --   --   --   --   --  107*  --  103*  --  106*   PLT  --   --   --   --   --   --   --   --  63*  --  55*  --  58*   NA  --  142  --   --  137  --  139  --  139  --   --    < >  --    POTASSIUM  --  3.4  --   --  3.4  --  3.7  3.7   < > 3.1*  --   --    < >  --    CHLORIDE  --  102  --   --  103  --  103  --  102  --   --    < >  --    CO2  --  30*  --   --  31  --  29  --  29  --   --    < >  --    BUN  --  61.0*  --   --  46*  --  62*  --  52*  --   --    < >  --    CR  --  3.02*  --   --  2.40*  --  3.15*  --  2.61*  --   --    < >  --    ANIONGAP  --  10  --   --  3  --  7  --  8  --   --    < >  --    KELLY  --  8.1*  --   --  7.8*  --  7.7*  --  8.1*  --   --    < >  --    * 134* 146*   < > 160*   < > 117*   < > 127*   < >  --    < >  --    ALBUMIN  --  2.2*  --   --  1.8*  --  1.7*  --  2.1*  --   --    < >  --     < > = values in this interval not displayed.     Medications     dextrose       - MEDICATION INSTRUCTIONS -         acetaminophen  975 mg Per Feeding Tube Q8H BIJU    Or     acetaminophen  650 mg Rectal Q8H BIJU     acetylcysteine  2 mL Nebulization BID     ampicillin-sulbactam  3 g Intravenous Q24H     apixaban ANTICOAGULANT  5 mg Oral or Feeding Tube BID     B and C vitamin Complex with folic acid  5 mL Per Feeding Tube Daily     carboxymethylcellulose PF  1 drop Both Eyes TID     chlorhexidine  15 mL Mouth/Throat BID     folic acid  1 mg Oral or Feeding Tube Daily     insulin aspart  1-6 Units Subcutaneous Q4H [facility]     insulin glargine  20  Units Subcutaneous Daily     ipratropium - albuterol 0.5 mg/2.5 mg/3 mL  3 mL Nebulization 4x daily     lacosamide  100 mg Oral or Feeding Tube BID     lactulose  20 g Oral or Feeding Tube TID     levETIRAcetam  1,000 mg Oral or Feeding Tube Q24H     micafungin  100 mg Intravenous Q24H     midodrine  10 mg Oral or Feeding Tube TID w/meals     nystatin  500,000 Units Swish & Swallow 4x Daily     pantoprazole  40 mg Per Feeding Tube BID     predniSONE  2.5 mg Oral Daily     QUEtiapine  50 mg Oral or Feeding Tube At Bedtime     rifaximin  550 mg Per Feeding Tube BID     tacrolimus  1 mg Oral or Feeding Tube BID

## 2022-12-30 NOTE — PROGRESS NOTES
Social Work Note:  Patient chart reviewed.  Patient discussed in morning rounds.  Barriers to discharge:   * Trach. Vent. Phase 1  * Rectal tube.     Patient a return/readmit to [facility] on 12/29/22.  Patient her with Trach, vent, PEG, rectal tube, dialysis.    At baseline, patient was living at home with spouse-Ofe.  Patient was independent at baseline.        Kb Liu, [facility] [place]  [phone]

## 2022-12-30 NOTE — PROGRESS NOTES
Pulmonary Progress Note    Admit Date: 12/29/2022  CODE: Full Code    Assessment/Plan:   57yoM with liver transplant(2016), recent prolonged hospitalization 11/12-12/15 for PEA cardiac arrest, Strep/Parainfluenza pneumonia with ARDS, MODS, right PTX requiring chest tube since removed.  Now HD dependent, s/p trach/PEG.  Discharged to LTAC, sent out the next day with new left PTX & GIB.  Recent hospitalization c/b GIB(resolved), PEA arrest and seizure on 12/20.  Left CT removed on 12/16.  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  Transferred back to LTAC 12/29.     Discussion of pertinent problems:  Acute hypoxic/hypercapnic respiratory failure s/p trach: multifactorial related to pneumonia(treated), ?aspergillus pna s/p 1 week of voriconazole, ARDS(resolved), b/l pneumothoraces(chest tubes removed), b/l pleural effusions & atelectasis(stable), pleural fluid cx growing Candida.  Alternating between PST and trach dome during the day.     Tracheostomy in place: 8 Shiley placed 11/29.  Changed to 6 Shiley prox xlt 12/8 unclear why     Dysphagia s/p PEG tube    Candida in left pleural fluid cultures: 4 weeks of antimicrobial currently on Micafungin.  If sensitive to fluconazole could de-escalate    LORETTA on MWF HD: followed by Nephrology     S/p Liver transplant with post transplant cirrhosis & ascites s/p paracentesis 12/22: on [place] and prednisone    CHRISTIAN on home BiPAP     lactobacillus peritonitis on unasyn.  Per ID ok to transition to oral Augmentin at discharge for a 4 week course.  ICU note states 2 weeks total?    Recent seizure after hypoxic event on 12/20 with subsequent seizure 12/21 during dialysis: Keppra and vimpat indefinitely     Multifocal acute ischemic strokes on MRI(12/20).  AC was on hold d/t GIB and left hemothorax, now resumed    Hx b/l pneumothoraces: right CT first  placed 11/16, removed, then replaced by IR on 12/12, removed again 12/19.  Left chest tube placed 12/16, removed on 12/19    Hx mucus plugging of bronchi.  Bronch on 12/20 with minimal secretions.  Currently off metanebs, no secretion issues currently      Plan:   - Phase 2 weans: TM/cuff up 3hr max BID.  Otherwise PST during the day with AC night.    - BDT today and may attempt PMV - BDT with delayed aspiration 45min later.  Will keep cuff up and do PMV trials with SLP only for now  - Likely downsize trach next week to 7 Bivona - if need to change emergently over the weekend for any reason, place a 7 Bivona    - Bronchial hygiene with Duonebs QID, Mucomyst, BID, Metanebs PRN  - Fluid removal with dialysis - to keep on dry side given b/l pleural effusions on imaging   - Micafungin for candida isolation in left pleural cultures.  De-escalate to fluconazole if able; Susceptibility testing still pending.  Likely 4 week course based on imaging  - Unasyn vs Augmentin for SBP - per primary    Henry Fenton, SHAWN  Pulmonary Medicine  [facility]  Pager [phone]    Subjective/Interim Events:   - on PS 8/5 in no distress.  Denies sob, pain, n/v, fever, chills  - Wife at bedside, all questions answered     Tracheal secretions:  Overnight - x2, sm, thick  Yesterday - none    Cough strength: not witnessed    Current phase of ventilator weaning pathway:  Phase 2    Ventilator weaning results   12/29: PS 12/5 for 9hr    Clinical status discussed today with respiratory therapist, SLP, patient, wife     Medications:       dextrose       - MEDICATION INSTRUCTIONS -         sodium chloride 0.9%  250 mL Intravenous Once in dialysis/CRRT     sodium chloride 0.9%  300 mL Hemodialysis Machine Once     acetaminophen  975 mg Per Feeding Tube Q8H BIJU    Or     acetaminophen  650 mg Rectal Q8H BIJU     acetylcysteine  2 mL Nebulization BID     ampicillin-sulbactam  3 g Intravenous Q24H     apixaban ANTICOAGULANT  5 mg Oral or Feeding  Tube BID     B and C vitamin Complex with folic acid  5 mL Per Feeding Tube Daily     carboxymethylcellulose PF  1 drop Both Eyes TID     chlorhexidine  15 mL Mouth/Throat BID     epoetin mirta-epbx  10,000 Units Subcutaneous Once per day on Mon Wed Fri     folic acid  1 mg Oral or Feeding Tube Daily     sodium chloride (PF) 0.9%  10 mL Intracatheter Once in dialysis/CRRT    Followed by     heparin  1.3-2.6 mL Intracatheter Once in dialysis/CRRT     sodium chloride (PF) 0.9%  10 mL Intracatheter Once in dialysis/CRRT    Followed by     heparin  1.3-2.6 mL Intracatheter Once in dialysis/CRRT     insulin aspart  1-6 Units Subcutaneous Q4H BIJU     insulin glargine  20 Units Subcutaneous Daily     ipratropium - albuterol 0.5 mg/2.5 mg/3 mL  3 mL Nebulization 4x daily     lacosamide  100 mg Oral or Feeding Tube BID     lactulose  20 g Oral or Feeding Tube TID     levETIRAcetam  1,000 mg Oral or Feeding Tube Q24H     micafungin  100 mg Intravenous Q24H     midodrine  10 mg Oral or Feeding Tube TID w/meals     - MEDICATION INSTRUCTIONS -   Does not apply Once     nystatin  500,000 Units Swish & Swallow 4x Daily     pantoprazole  40 mg Per Feeding Tube BID     predniSONE  2.5 mg Oral Daily     QUEtiapine  50 mg Oral or Feeding Tube At Bedtime     rifaximin  550 mg Per Feeding Tube BID     sodium chloride (PF)  9 mL Intracatheter During Dialysis/CRRT (from stock)     sodium chloride (PF)  9 mL Intracatheter During Dialysis/CRRT (from stock)     tacrolimus  1 mg Oral or Feeding Tube BID     torsemide  100 mg Per Feeding Tube Daily         Exam/Data:   Vitals  /71 (BP Location: Left arm)   Pulse 99   Temp 98.1  F (36.7  C) (Oral)   Resp 24   Wt 93.2 kg (205 lb 8 oz)   SpO2 100%   BMI 27.87 kg/m       I/O last 3 completed shifts:  In: 600 [I.V.:130; NG/GT:470]  Out: 1050 [Stool:1050]  Weight change:     Vent Mode: CPAP/PS  (Continuous positive airway pressure with Pressure Support)  FiO2 (%): 40 %  Resp Rate (Set):  12 breaths/min  Tidal Volume (Set, mL): 450 mL  PEEP (cm H2O): 5 cmH2O  Pressure Support (cm H2O): 8 cmH2O  Resp: 24      EXAM:  Gen: no distress in bed on PS 8/5  HEENT: NT, trach midline/intact, peristomal ecchymosis  CV: RRR  Resp: CTAB; non-labored   Abd: large, soft, mildly tender, BS hypoactive, rectal tube   Skin: no visible rashes or lesions, scattered ecchymosis, fragile   Ext: mod right pedal edema, weak  Neuro: alert, follows commands, mouths words     ROS:  A 10-system review was obtained and is negative with the exception of the symptoms noted above.    Labs:  Complete Blood Count   Recent Labs   Lab 12/27/22  0420 12/26/22  0436 12/24/22  1808   WBC 7.5 4.8 5.8   HGB 8.8* 8.1* 8.4*   PLT 63* 55* 58*     Basic Metabolic Panel  Recent Labs   Lab 12/30/22  1153 12/30/22  0817 12/30/22  0651 12/30/22  0330 12/29/22  0549 12/29/22  0440 12/28/22  0430 12/28/22  0428 12/27/22  1210 12/27/22  1120 12/27/22  0429 12/27/22  0420   NA  --   --  142  --   --  137  --  139  --   --   --  139   POTASSIUM  --   --  3.4  --   --  3.4  --  3.7  3.7  --  3.8  --  3.1*   CHLORIDE  --   --  102  --   --  103  --  103  --   --   --  102   CO2  --   --  30*  --   --  31  --  29  --   --   --  29   BUN  --   --  61.0*  --   --  46*  --  62*  --   --   --  52*   CR  --   --  3.02*  --   --  2.40*  --  3.15*  --   --   --  2.61*   * 134* 134* 146*   < > 160*   < > 117*   < >  --    < > 127*    < > = values in this interval not displayed.     Liver Function Tests  Recent Labs   Lab 12/30/22  0651 12/29/22  0440 12/28/22  0428 12/27/22  0420   ALBUMIN 2.2* 1.8* 1.7* 2.1*       RADIOLOGY: Personally reviewed; radiology read below   XR CHEST 12/27/2022     HISTORY: Shortness of breath, mucous plug?     COMPARISON: Chest radiograph 12/24/2022                                                                      IMPRESSION: Stable cardiomediastinal silhouette with unchanged  position of right upper extremity PICC and  dialysis catheter.  Tracheostomy tube tip overlies the midthoracic trachea. Persistent  bilateral pleural effusions with associated bibasilar atelectasis. No  new airspace consolidation or lobar atelectasis. No pneumothorax.  Bones are unchanged. Percutaneous gastrostomy tube partially  visualized.

## 2022-12-30 NOTE — PLAN OF CARE
Problem: Malnutrition  Goal: Improved Nutritional Intake  Intervention: Optimize Nutrition Delivery  Flowsheets (Taken 12/30/2022 1441)  Nutrition Support Management: weight trending reviewed     Problem: Enteral Nutrition  Goal: Feeding Tolerance  Intervention: Prevent and Manage Feeding Intolerance  Flowsheets (Taken 12/30/2022 1441)  Nutrition Support Management: weight trending reviewed   Goal Outcome Evaluation:  Tolerating TF at goal rate, noted severe weight loss and muscle wasting since initial hospitalization. Will continue current regimen.

## 2022-12-30 NOTE — PROGRESS NOTES
RT PROGRESS NOTE     DATA:     CURRENT SETTINGS:             TRACH TYPE / SIZE:  #6 Shiley XLT Proximal (placed 11/29)             MODE:   AC 12, 450, +5             FIO2:   26%     ACTION:             THERAPIES:   Duoneb QID, Mucomyst BID             SUCTION:                           FREQUENCY:   x6                        AMOUNT:   Small to Moderate                        CONSISTENCY:   Thick/Thin                        COLOR:   Pale Yellow/Blue             SPONTANEOUS COUGH EFFORT/STRENGTH OF EFFORT (not elicited by suctioning): Weak to Moderate Spontaneous Cough                           WEANING PHASE:   Phase 2                        WEAN MODE:    PS 8/5, 40%/40L TM/PMV, PS 8/5, PS 10/5                        WEAN TIME:   2 hours and 16 minutes, 45 minutes, 3 hours and 53 minutes, 1hr and 23 minutes                        END WEAN REASON:   SOB, Ended For Dialysis, PS Before TM     RESPONSE:             BS:   Faint Coarse/Diminished             VITAL SIGNS:   Sating 100%, HR 83-96, RR 14-24             EMOTIONAL NEEDS / CONCERNS:  NA                RISK FOR SELF DECANNULATION:  No       NOTE / PLAN:   TM/cuff up 3hrs max BID, otherwise PS during the day with AC at night.  PMV with SLP only for now.  If need to change trach emergently for any reason over the weekend place a #7 Bivona.  BDT done today with no immediate but patient had moderate blue suctioned after 45 minutes.  RT will continue to monitor.

## 2022-12-30 NOTE — CONSULTS
RENAL CONSULT NOTE    REQUESTING PHYSICIAN: Hospitalist     REASON FOR CONSULT: LORETTA on HD     ASSESSMENT/PLAN:  Anuric LORETTA requiring dialysis: After PEA arrest, was on CRRT which was discontinued 11/26/2022 and now started on intermittent hemodialysis ongoing since 11/29/2022.  He is maintained on a MWF schedule.  No signs of renal recovery yet.  Start diuretic challenge and bladder scans.  Dialysis orders in for today, plan for 1-2 kg UF goal.  Midodrine to support blood pressure and UF. MWF HD at [facility]    Anemia of chronic renal failure:   Hematochezia   Left hemothorax.    Erythropoietin 10,000 units 3 times weekly on dialysis days.  Update iron studies and replace if indicated.    Chronic hypotension: Likely due to chronic liver disease.  Midodrine 10 mg 3 times daily.  Make as needed available with HD.    Hypokalemia: Needing intermittent replacement.  He is higher K bath with HD.    Acute on chronic hypoxic respiratory failure: S/p tracheostomy 11/29/2022.  Complicated by left hemopneumothorax s/p left chest tube.  History of aspergillus PNA and multiple bacterial PNA. Mgmt per pulm     CARRILLO cirrhosis s/p liver transplant: Immunosuppression per GI.  Tacrolimus and prednisone.  Paracentesis as indicated per GI.    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  Initial seizure after PEA arrest, then another seizure again 12/21/2022 during dialysis session.  Initially on acyclovir and then switched over to ganciclovir, since been discontinued.  On Keppra.  Management per neurology and ID    HPI:   Blanco is a 57-year-old male with a past medical history of Carrillo cirrhosis s/p liver transplant 2016, atrial fibrillation, hypertension, CHRISTIAN, CVA, with recent prolonged admission after PEA arrest s/p trach and PEG.  Then he was readmitted over at [facility] on 12/16/2022 with hematochezia and left hemothorax s/p left chest tube.  He is transferred over to the LTAC for ongoing cares.  And  uric LORETTA requiring renal replacement therapy due to PEA arrest.  Briefly on CRRT discontinued 11/26/2022.  Started on intermittent hemodialysis 11/29/2022, he remains on a Monday, Wednesday, Friday dialysis schedule.  There is no evidence of renal recovery yet.    Patient was seen bedside  Working with physical therapy  Able to answer questions by nodding his head or shaking his head  Mentions that he is fairly nauseous  No vomiting  Does mention some abdominal discomfort  Does not think he has made any urine but he is unsure  Says he gets a little bit lightheaded and dizzy occasionally during dialysis treatments  Occasional cramps on dialysis  Mentions that he is mildly short of breath  Discussed dialysis plans    REVIEW OF SYSTEMS:  Complete 12 point review of systems was negative other than those noted in the HPI      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     Cirrhosis of liver with ascites (H) 2/11/2016     H/O alcohol abuse      HTN (hypertension)      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  acetylcysteine (MUCOMYST) 20 % neb solution, Take 2 mLs by nebulization 2 times daily  albuterol (PROVENTIL) (2.5 MG/3ML) 0.083% neb solution, Take 1 vial (2.5 mg) by nebulization every 2 hours as needed for shortness of breath  apixaban ANTICOAGULANT (ELIQUIS) 5 MG tablet, 1 tablet (5 mg) by Oral or Feeding Tube route 2 times daily  calcium carbonate (TUMS)  500 MG chewable tablet, Take 1-2 chew tab by mouth 4 times daily as needed for heartburn  carboxymethylcellulose PF (REFRESH PLUS) 0.5 % ophthalmic solution, Place 1 drop into both eyes 3 times daily  famotidine (PEPCID) 20 MG tablet, Take 20 mg by mouth 2 times daily as needed (heartburn)  folic acid 5 MG/ML injection, Inject 0.2 mLs (1 mg) into the vein daily  glucose 40 % (400 mg/mL) gel, Take 15-30 g by mouth every 15 minutes as needed for low blood sugar  hydrocortisone sodium succinate PF (SOLU-CORTEF) 100 MG injection, Inject 0.5 mLs (25 mg) into the vein every 6 hours  insulin glargine (LANTUS PEN) 100 UNIT/ML pen, Inject 30 Units Subcutaneous every morning (before breakfast)  ipratropium - albuterol 0.5 mg/2.5 mg/3 mL (DUONEB) 0.5-2.5 (3) MG/3ML neb solution, Take 1 vial (3 mLs) by nebulization 4 times daily  lactulose (CHRONULAC) 10 GM/15ML solution, 30 mLs (20 g) by Oral or Feeding Tube route 3 times daily  midodrine (PROAMATINE) 10 MG tablet, 1 tablet (10 mg) by Oral or Feeding Tube route 3 times daily (with meals)  Multiple Vitamins-Minerals (MULTIVITAMINS W/MINERALS) liquid, 15 mLs by Per Feeding Tube route daily  nystatin (MYCOSTATIN) 239507 UNIT/ML suspension, Swish and swallow 5 mLs (500,000 Units) in mouth 4 times daily  ondansetron (ZOFRAN ODT) 4 MG ODT tab, Take 1 tablet (4 mg) by mouth every 6 hours as needed for nausea or vomiting  order for DME, Equipment being ordered: Compression knee high stockings for B LE lymphedema 20-30mmHg (Patient not taking: Reported on 2/20/2019)  order for DME, Equipment being ordered: Class 2 (30-40mmHg) compression knee high stockings, night time knee high compression alternative, bandaging supplies (Patient not taking: Reported on 2/20/2019)  oxyCODONE (ROXICODONE) 5 MG tablet, 1 tablet (5 mg) by Per Feeding Tube route every 4 hours as needed for moderate pain (4-6)  pantoprazole (PROTONIX) 2 mg/mL SUSP suspension, 20 mLs (40 mg) by Per Feeding Tube route every  morning (before breakfast)  rifaximin (XIFAXAN) 550 MG TABS tablet, 1 tablet (550 mg) by Per Feeding Tube route 2 times daily  tacrolimus (GENERIC EQUIVALENT) 1 mg/mL suspension, 1 mL (1 mg) by Oral or Feeding Tube route 2 times daily        No current outpatient medications on file.      ALLERGIES/SENSITIVITIES:  No Known Allergies  Social History     Tobacco Use     Smoking status: Never     Smokeless tobacco: Never   Substance Use Topics     Alcohol use: Not Currently     Alcohol/week: 0.0 standard drinks     Drug use: No             PHYSICAL EXAM:  Physical Exam   Temp: 98.1  F (36.7  C) Temp src: Oral BP: 129/71 Pulse: 93   Resp: 17 SpO2: 100 % O2 Device: Mechanical Ventilator    Vitals:    12/30/22 0259   Weight: 93.2 kg (205 lb 8 oz)     Vital Signs with Ranges  Temp:  [97.5  F (36.4  C)-99.5  F (37.5  C)] 98.1  F (36.7  C)  Pulse:  [] 93  Resp:  [14-27] 17  BP: (111-140)/(62-94) 129/71  FiO2 (%):  [30 %] 30 %  SpO2:  [90 %-100 %] 100 %  I/O last 3 completed shifts:  In: 600 [I.V.:130; NG/GT:470]  Out: 1050 [Stool:1050]      Patient Vitals for the past 72 hrs:   Weight   12/30/22 0259 93.2 kg (205 lb 8 oz)       General: Alert, NAD, answers questions with nodding   HENT: NT, trach  Eyes: No scleral icterus  Cardiovascular: RRR, no rub, gallop, or murmur. No peripheral edema.  Respiratory: CTAB, non-labored  Gastrointestinal: Soft, mildly distended with ascites.  Mild abdominal edema.  Rectal tube in place.  Musculoskeletal: Grossly normal   Integumentary: Warm, dry, no rash  Neurologic: Non focal   Psychiatric: Cooperative  : No Nixon  Access: Right tunneled CVC.  Clear dressing covering.  No surrounding erythema or exudates.  Laboratory:     Recent Labs   Lab 12/27/22  0420 12/26/22  0436 12/24/22  1808   WBC 7.5 4.8 5.8   RBC 2.76* 2.54* 2.68*   HGB 8.8* 8.1* 8.4*   HCT 29.4* 26.1* 28.4*   PLT 63* 55* 58*       Basic Metabolic Panel:  Recent Labs   Lab 12/30/22  0817 12/30/22  0651 12/30/22  0336  12/30/22  0007 12/29/22  1949 12/29/22  1641 12/29/22  0549 12/29/22  0440 12/28/22  0430 12/28/22  0428 12/27/22  1210 12/27/22  1120 12/27/22  0429 12/27/22  0420 12/26/22  0443 12/26/22  0435 12/24/22  0735 12/24/22  0551   NA  --  142  --   --   --   --   --  137  --  139  --   --   --  139  --  136  --  137   POTASSIUM  --  3.4  --   --   --   --   --  3.4  --  3.7  3.7  --  3.8  --  3.1*  --  3.7   < > 3.6   CHLORIDE  --  102  --   --   --   --   --  103  --  103  --   --   --  102  --  101  --  102   CO2  --  30*  --   --   --   --   --  31  --  29  --   --   --  29  --  26  --  25   BUN  --  61.0*  --   --   --   --   --  46*  --  62*  --   --   --  52*  --  62*  --  37*   CR  --  3.02*  --   --   --   --   --  2.40*  --  3.15*  --   --   --  2.61*  --  3.08*  --  2.25*   * 134* 146* 160* 159* 166*   < > 160*   < > 117*   < >  --    < > 127*   < > 231*   < > 228*   KELLY  --  8.1*  --   --   --   --   --  7.8*  --  7.7*  --   --   --  8.1*  --  8.2*  --  8.7    < > = values in this interval not displayed.       INRNo lab results found in last 7 days.    Recent Labs   Lab Test 12/30/22 0651 12/29/22 0440   POTASSIUM 3.4 3.4   CHLORIDE 102 103   BUN 61.0* 46*      Recent Labs   Lab Test 12/30/22 0651 12/29/22  0440 12/24/22  0551 12/23/22  0401 12/22/22  1652 12/22/22  1108 11/25/22  1947 11/25/22  1859 07/10/18  0720 10/31/17  1038   ALBUMIN 2.2* 1.8*   < > 1.9*   < > 2.1*   < >  --    < >  --    BILITOTAL  --   --   --  0.6  --  0.8   < >  --    < >  --    ALT  --   --   --  17  --  17   < >  --    < >  --    AST  --   --   --  14  --  16   < >  --    < >  --    PROTEIN  --   --   --   --   --   --   --  70*  --  Negative    < > = values in this interval not displayed.       Personally reviewed today's laboratory studies    This note was dictated using voice recognition       Thank you for involving us in the care of this patient. We will continue to follow along with you.      Marin Lockwood,  WALDO  [facility]  [phone]

## 2022-12-30 NOTE — PLAN OF CARE
Problem: Pain Acute  Goal: Optimal Pain Control and Function  Outcome: Progressing   Goal Outcome Evaluation:             Pt has been on vent all night, denied pain/ discomforts, v/signs stable. Continues to have loose BM, [employer]  output was 550ml, bag changed this morning.

## 2022-12-30 NOTE — PLAN OF CARE
Problem: Mechanical Ventilation Invasive  Goal: Effective Communication  Outcome: Progressing  Goal: Optimal Device Function  Outcome: Progressing  Intervention: Optimize Device Care and Function  Recent Flowsheet Documentation  Taken 12/30/2022 1519 by Rocio Giang RT  Airway Safety Measures: all equipment/monitors on and audible  Taken 12/30/2022 1303 by Rocio Giang RT  Airway Safety Measures: all equipment/monitors on and audible  Taken 12/30/2022 1218 by Rocio Giang RT  Airway Safety Measures: all equipment/monitors on and audible  Taken 12/30/2022 1105 by Rocio Giang RT  Airway Safety Measures: all equipment/monitors on and audible  Taken 12/30/2022 0825 by Rocio Giang RT  Airway Safety Measures: all equipment/monitors on and audible  Taken 12/30/2022 0702 by Rocio Giang RT  Airway Safety Measures: all equipment/monitors on and audible  Goal: Mechanical Ventilation Liberation  Outcome: Progressing  Goal: Absence of Device-Related Skin and Tissue Injury  Outcome: Progressing  Goal: Absence of Ventilator-Induced Lung Injury  Outcome: Progressing   Goal Outcome Evaluation:

## 2022-12-30 NOTE — PLAN OF CARE
Occupational Therapy Discharge Summary    Reason for therapy discharge:    Discharged to LTACH    Progress towards therapy goal(s). See goals on Care Plan in [facility] electronic health record for goal details.  Goals not met.  Barriers to achieving goals:   discharge from facility.    Therapy recommendation(s):    Continued therapy is recommended.  Rationale/Recommendations:  Pt significantly below baseline fro ADL performance. Limited by O2 needs, significant deconditioning, weakness from prolonged LTACH, ICU stay. Recommend further therapies at LTACH to increase independence, strength, activity ashley.

## 2022-12-30 NOTE — PROGRESS NOTES
Hemodialysis Progress Note:    Assessment: Pt A&O, denied SOB, N/V or chest pain.Generalized edema noted. LS diminished throughout.     Pre Access: Right CVC dressing remains CDI. No issues with flushing or aspirating from both lumens. Heparin dwell removed, lumens flushed with NS prior to tx initiation. No s/s of infection noted. Dressing changed last on 12/30/22.    UF Goal: 1400mL    BVP: 63.4L    Net Fluid Removed: 1100mL    Dialyzer: [id number] with streaked rinse post. No heparin used this tx.     Run Summary: Uneventful tx, pt tolerated UF goal adequately. UF goal adjusted as able to support BP's and crit line data. Ran against K4. Lines reversed during tx d/t arterial collapse noted. Post tx report provided PETER Wilson.     Interventions: Vital signs monitored [id number] and crit-line data used throughout.    Post Access: Both lumens flushed with NS, heparin locked and end caps changed. Lumens labeled and wrapped in gauze.     Plan: Per Renal MD

## 2022-12-30 NOTE — PROGRESS NOTES
"Speech pathology: Speech-language evaluation with speaking valve evaluation and blue dye test   12/30/22 1230   Appointment Info   Signing Clinician's Name / Credentials (SLP) Matti Santos MA, [facility]-SLP   Rehab Comments (SLP) RT reports plan to transition patient from vent to trach mask prior to session.   General Information   Referring Physician Catrina   Patient/Family Therapy Goal Statement (SLP) To talk, eat and drink   Pertinent History of Current Problem Per pulmonary note, dated yesterday: \"57yoM with liver transplant(2016), recent prolonged hospitalization 11/12-12/15 for PEA cardiac arrest, Strep/Parainfluenza pneumonia with ARDS, MODS, right PTX requiring chest tube since removed.  Now on HD, s/p trach/PEG.  Discharged to LTAC and sent out the next day with new left PTX & GIB.  Recent hospitalization c/b GIB, PEA arrest and seizure on 12/20.  Left CT removed on 12/16.  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  Transferred back to LTAC 12/29.\"   General Observations Patient's wife present.  Patient alert and cooperative   Type of Evaluation   Type of Evaluation Artificial Airway (Speaking Valve);Swallow Evaluation;Speech, Language, Cognition   Tracheostomy Assessment (Speaking Valve)   Type, Tracheostomy Tube Shiley   Tube Size, Tracheostomy 6 XLT   Cuff, Tracheostomy Tube cuffed, inflated   Date of Tracheostomy 11/29/22   Respiratory Status (Speaking Valve)   Oxygen Supply trach mask   Oral/Tracheal Secretions (Speaking Valve)   Oral Secretions (Speaking Valve Assessment) minimal secretions   Tracheal Secretions (Speaking Valve Assessment) minimal secretions   Speaking Valve Trials (Speaking Valve)   Cuff Inflated at Onset of Evaluation Yes   Orders received to deflate cuff for PMSV trial Yes   Oxygen saturation before cuff deflation 98 %   Set-up/Cuff Deflation " (Speaking Valve Trial) cuff deflated, total;tolerance, adequate airflow   Oxygen saturation after cuff deflation 98 %   Secretions/Suction, Cuff Deflation (Speaking Valve) oral suctioning post cuff deflation;tracheal suctioning prior to trial;tracheal suctioning post cuff deflation   Leak Speech leak speech attempted/achieved   Airflow/Phonation Air flow around trach adequate with finger occlusion   Speaking Valve placed on tracheostomy tube   Oxygen saturation with PMSV placement 100 %   Respiratory Rate with PMSV placement 25 Per Minute   Breath Support (Speaking Valve Trial) coordinates speech with breath support;exhales through mouth   Voice Production (Speaking Valve Trial) voicing achieved;fair strength/quality   Secretions During Valve Use (Speaking Valve Trial) secretions stable during valve use   Outcome of Trial (Speaking Valve) tolerance is good   Total amount of time with PMSV placement: 20   Recommendations (Speaking Valve Trials) speaking valve use recommended   Total amount of time with leak speech 1 minute   Oral Motor   Oral Musculature generally intact   Structural Abnormalities none present   Mucosal Quality adequate   Dentition (Oral Motor)   Dentition (Oral Motor) natural dentition   Facial Symmetry (Oral Motor)   Facial Symmetry (Oral Motor) WNL   Lip Function (Oral Motor)   Lip Range of Motion (Oral Motor) WNL   Tongue Function (Oral Motor)   Tongue ROM (Oral Motor) WNL   Tongue Strength (Oral Motor) WNL   Tongue Coordination/Speed (Oral Motor) WNL   Cough/Swallow/Gag Reflex (Oral Motor)   Volitional Swallow (Oral Motor) weak   Vocal Quality/Secretion Management (Oral Motor)   Vocal Quality (Oral Motor) hoarse;hypophonic;breathy   General Swallowing Observations   Current Diet/Method of Nutritional Intake (General Swallowing Observations, NIS) NPO   Respiratory Support (General Swallowing Observations) trach collar   Past History of Dysphagia None known   Comment, Secretions/Suctioning Blue  Dye Test: The reason for the current test was to evaluate swallow/secretion management. The patient had minimal oral secretions and the patient had minimal  tracheal secretions. The dye was given with the cuff up. A spontaneous swallow was elicited. Hyolaryngeal movement appeared reduced. There was no blue dye noted around trach site. Upon initial tracheal suctioning with cuff down there was no blue dye observed, confirmed by RT present. Hand off to RT, Rocio, was completed in room. Follow-up suctioning for delayed aspiration to be completed by RT. Please see RT notes for details.   Swallowing Recommendations   Diet Consistency Recommendations NPO   Medication Administration Recommendations, Swallowing (SLP) Via feeding tube   Motor Speech   Speech Intelligibility (Motor Speech) phrase/sentence level   Resonance (Motor Speech) WFL   Speech Fluency (Motor Speech) WFL   Rate/Prosody (Motor Speech) WFL   Articulation (Motor Speech) WFL   Phrase/Sentence Level, Speech Intelligibility (Motor Speech) intact   Auditory Comprehension   1 Step, Follows Commands (Auditory Comprehension) intact   Yes/No Questions (Auditory Comprehension) biographical/personal questions   Biographical/Personal Questions (Auditory Comprehension) intact   Comment, Assessment (Auditory Comprehension) Some difficulty following moderately complex conversation, answering questions.  Appeared to be related to cognitive deficits than auditory comprehension or processing   Verbal Expression   Confrontational Naming (Verbal Expression) objects   Objects, Confrontational Naming (Verbal Expression) intact   Comment, Assesment (Verbal Expression) Appeared to be more related to cognition than verbal expression   Conversational Speech (Verbal Expression) connected speech   Connected Speech, Conversational (Verbal Expression) minimal impairment   Reading Comprehension   Oral Reading Ability (Reading Comprehension) WFL;paragraph level   Comprehension Level  (Reading Comprehension) paragraph level tasks   Paragraph Level, Oral Reading Ability (Reading Comprehension) intact   Paragraph Level, Comprehension Level (Reading Comprehension) intact   Cognition   Affect/Mental Status (Cognition) confused   Orientation Status (Cognition) oriented to;person;other (see comments)  (Incompletely to place, situation, and time.)   General Therapy Interventions   Planned Therapy Interventions Cognitive Treatment;Communication;Voice;Dysphagia Treatment   Cognitive treatment External memory strategy training   Voice Voice quality/pitch or volume tasks   Communication Speaking valve instruction   Clinical Impression   Criteria for Skilled Therapeutic Interventions Met (SLP Eval) Yes, treatment indicated   SLP Diagnosis Dysphonia, cognitive linguistic impairment, dysphagia   Risks & Benefits of therapy have been explained evaluation/treatment results reviewed;care plan/treatment goals reviewed;participants voiced agreement with care plan;participants included;patient   Clinical Impression Comments Patient presents with dysphonia due to inadequate airflow secondary to excessive trach size versus inadequate vocal function.  In the absence of back pressure with removal of speaking valve, may favor vocal fold inadequacy.  Patient also demonstrates at least moderate confusion, disorientation and decreased problem-solving.  This impacts performance on auditory comprehension and verbal expression tasks as well.   SLP Total Evaluation Time   Eval: oral/pharyngeal swallow function, clinical swallow Minutes (55086) 10   Eval: Sound production with lang comprehension and expression Minutes (09325) 15   Eval: use and/or fitting of voice prosthetic device to supp speech (not aug. comm) Minutes (16606) 20   SLP Goals   Therapy Frequency (SLP Eval) 5 times/wk   SLP Predicted Duration/Target Date for Goal Attainment 02/10/23   SLP Goals Speaking Valve;Swallow;SLP Goal 1                   SLP Discharge  Planning   SLP Plan Speaking valve trial, simple cognitive linguistic tasks, clinical swallow evaluation once tolerating speaking valve on trach mask consistently   SLP Discharge Recommendation Transitional Care Facility   SLP Rationale for DC Rec Communication and swallow function are below baseline

## 2022-12-30 NOTE — PLAN OF CARE
Problem: Mechanical Ventilation Invasive  Goal: Optimal Device Function  Intervention: Optimize Device Care and Function  Recent Flowsheet Documentation  Taken 12/30/2022 1100 by Katie Franco RN  Airway Safety Measures: all equipment/monitors on and audible  Oral Care: swabbed with antiseptic solution     Problem: Plan of Care - These are the overarching goals to be used throughout the patient stay.    Goal: Absence of Hospital-Acquired Illness or Injury  Outcome: Progressing  Intervention: Identify and Manage Fall Risk  Recent Flowsheet Documentation  Taken 12/30/2022 1100 by Katie Franco RN  Safety Promotion/Fall Prevention: bed alarm on  Intervention: Prevent Infection  Recent Flowsheet Documentation  Taken 12/30/2022 1100 by Katie Franco RN  Infection Prevention: hand hygiene promoted   Goal Outcome Evaluation:       Pt repositioned every 2 hours and as needed for comfort.  Pt denied any complaint of pain  at this time.  Pt having dialysis this afternoon.  Will continue to monitor.

## 2022-12-30 NOTE — PLAN OF CARE
Goal Outcome Evaluation:        Patient alert and oriented. Calm and cooperative with cares. On mechanical vent. Continuous TF Nova source renal 50 ml /hr at this time. GT site covered with Mepilex and transparent tape. Unable to assess at this time. Generalized ecchymosis and bruises noted. Left side abdominal fold excoriations clean and dry and Open to air. Right outer nose wound is clean and no drainage. Left elbow wound no drainage and no sign or symptom of infection. Covered with Mepilex. BG was 166 and 159. Sliding scale coverage given.Rectal tube intact. Watery brown BM. Dialysis pt.  PICC line and CVC intact and clean. Scheduled tylenol given for arm pain and abdominal pain 7-9 /10. Vitals stable.Wife was here at the beginning of the shift.Will continue monitoring.  Problem: Mechanical Ventilation Invasive  Goal: Effective Communication  Intervention: Ensure Effective Communication  Recent Flowsheet Documentation  Taken 12/29/2022 2200 by Katty Navarro RN  Family/Support System Care: involvement promoted  Trust Relationship/Rapport:   care explained   choices provided   questions answered  Goal: Optimal Device Function  Intervention: Optimize Device Care and Function  Recent Flowsheet Documentation  Taken 12/29/2022 2200 by Katty Navarro RN  Airway Safety Measures: all equipment/monitors on and audible  Oral Care: swabbed with antiseptic solution  Goal: Mechanical Ventilation Liberation  Intervention: Promote Extubation and Mechanical Ventilation Liberation  Recent Flowsheet Documentation  Taken 12/29/2022 2200 by Katty Naavrro RN  Medication Review/Management: medications reviewed  Goal: Absence of Ventilator-Induced Lung Injury  Intervention: Prevent Ventilator-Associated Pneumonia  Recent Flowsheet Documentation  Taken 12/29/2022 2200 by Katty Navarro RN  Oral Care: swabbed with antiseptic solution     Problem: Plan of Care - These are the overarching goals to be used throughout the  patient stay.    Goal: Absence of Hospital-Acquired Illness or Injury  Intervention: Identify and Manage Fall Risk  Recent Flowsheet Documentation  Taken 12/29/2022 2200 by Katty Navarro RN  Safety Promotion/Fall Prevention:   bed alarm on   activity supervised   fall prevention program maintained   lighting adjusted   room door open   room near nurse's station  Intervention: Prevent Infection  Recent Flowsheet Documentation  Taken 12/29/2022 2200 by Katty Navarro, RN  Infection Prevention:   environmental surveillance performed   equipment surfaces disinfected   rest/sleep promoted   hand hygiene promoted   single patient room provided  Goal: Optimal Comfort and Wellbeing  Intervention: Provide Person-Centered Care  Recent Flowsheet Documentation  Taken 12/29/2022 2200 by Katty Navarro RN  Trust Relationship/Rapport:   care explained   choices provided   questions answered

## 2022-12-30 NOTE — CONSULTS
CLINICAL NUTRITION SERVICES  -  ASSESSMENT NOTE       Recommendations Ordered by Registered Dietitian (RD):   Continue previous TF regimen as follows:  Enteral Frequency:  Continuous  Enteral Regimen: Novasource Renal at 60 mL/hr  Total Enteral Provisions: 2880 kcal (33 kcal/kg), 131 g protein (1.5 g/kg), 264 g CHO, 0 g fiber, 1032 mL H2O  Free Water Flush: 30 mL every 4 hrs  Nephronex daily via FT   Future/Additional Recommendations:   Weight, labs, tolerance   Malnutrition:   % Weight Loss:  > 7.5% in 3 months (severe malnutrition)  % Intake:  </= 50% for >/= 5 days (severe malnutrition)  Subcutaneous Fat Loss:  Orbital region severe depletion and Upper arm region moderate-severe depletion  Muscle Loss:  Temporal region severe depletion, Clavicle bone region severe depletion, Acromion bone region severe depletion, Dorsal hand region severe depletion, Anterior thigh region moderate depletion and Posterior calf region severe depletion  Fluid Retention:  None noted    Malnutrition Diagnosis: Severe malnutrition  In Context of:  Acute illness or injury     REASON FOR ASSESSMENT  Blanco Osborne is a 57 year old male seen by Registered Dietitian for Provider Order - Registered Dietitian to Assess and Order TF per Medical Nutrition Therapy Protocol    PMH:  History of liver transplant([facility] 2016), [facility],hospitalized 11/12/22 w/ respiratory failure d/t  Pneumonia,s/p cardiac arrest w/ prolonged resuscitation s/p trach and PEG, ARF on HD    NUTRITION HISTORY    PEG    Novasource Renal at 60 mL/hr at [facility]    Food allergies/intolerances: [facility]     Cultural needs or preferences none noted    Per previous hospital admission notes, pt was dieting the past year PTA in effort to lose wt, was incorporating protein bars and shakes (Ensure Max), overall minimal oral intakes by choice    Factors affecting nutrition intake include:swallowing difficulties     Novasource Renal at 60 mL/hr continuous,Free Water Flush: 30 mL every 4  hours     CURRENT NUTRITION ORDERS  Diet Order:     None    Current Intake/Tolerance:  TF at goal rate providin kcal (33 kcal/kg), 131 g protein (1.5 gm/kg), 264 g CHO, 0 g fiber, 1032 mL H2O      TF off d/t possible GI bleed on , resumed  and advanced to goal rate 40 mL/hr, goal rate 60 mL/hr met on . Overall, 9 days meeting <50% estimated needs    NUTRITION FOCUSED PHYSICAL ASSESSMENT FOR DIAGNOSING MALNUTRITION)  Yes         Observed:    Muscle wasting (refer to documentation in Malnutrition section) and Subcutaneous fat loss (refer to documentation in Malnutrition section)   Skin tenting    Obtained from Chart/Interdisciplinary Team:  Rectal tube  :  Paracentesis = 4 Liters removed   Patient has copious amount of ecchymosis on upper body, including shoulders and chest and back, left arm/elbow, purpura noted on abdomen    ANTHROPOMETRICS  Height: 6'  Weight: 205 lbs 8 oz  Body mass index is 27.87 kg/m .  Weight Status:  Overweight BMI 25-29.9  Weight History:   Patient was 236# on hospital admit (22) --> down 31# or 13% in 2 months - severe  Wt Readings from Last 10 Encounters:   22 93.2 kg (205 lb 8 oz)   22 96 kg (211 lb 10.3 oz)   22 100.2 kg (221 lb)   22 100.2 kg (221 lb)   12/15/22 87 kg (191 lb 12.8 oz)   10/07/19 137.5 kg (303 lb 3.2 oz)   10/04/19 131.5 kg (290 lb)   19 140.4 kg (309 lb 9.6 oz)   07/10/18 137.5 kg (303 lb 3.2 oz)   10/31/17 133.8 kg (295 lb)     LABS  Labs reviewed  Labs:  Electrolytes  Potassium (mmol/L)   Date Value   2022 3.4   2022 3.4   2022 3.7   2022 3.7   2020 3.9   10/07/2019 4.3   2019 4.2     Phosphorus (mg/dL)   Date Value   2022 3.1   2022 2.4 (L)   2022 2.8   2022 2.8   2022 2.4 (L)   10/17/2017 3.4   2017 3.5   2017 3.3   2017 4.0   2016 4.2    Blood Glucose  Glucose (mg/dL)   Date Value   2022 134 (H)   2022  160 (H)   12/28/2022 117 (H)   12/27/2022 127 (H)   12/26/2022 231 (H)   12/24/2022 228 (H)   04/20/2020 93   10/07/2019 102 (H)   02/20/2019 95   07/10/2018 89   10/31/2017 97     GLUCOSE BY METER POCT (mg/dL)   Date Value   12/30/2022 134 (H)   12/30/2022 146 (H)   12/30/2022 160 (H)   12/29/2022 159 (H)   12/29/2022 166 (H)     Hemoglobin A1C (%)   Date Value   11/19/2022 4.7   09/22/2016 4.8    Inflammatory Markers  CRP Inflammation (mg/L)   Date Value   11/22/2022 22.3 (H)     WBC (10e9/L)   Date Value   04/20/2020 4.0   10/07/2019 7.7   02/20/2019 3.9 (L)     WBC Count (10e3/uL)   Date Value   12/27/2022 7.5   12/26/2022 4.8   12/24/2022 5.8     Albumin (g/dL)   Date Value   12/30/2022 2.2 (L)   12/29/2022 1.8 (L)   12/28/2022 1.7 (L)   12/27/2022 2.1 (L)   04/20/2020 3.9   10/07/2019 3.8   02/20/2019 3.8      Magnesium (mg/dL)   Date Value   12/26/2022 2.0   12/24/2022 1.9   12/23/2022 1.8   10/17/2017 1.5 (L)   09/19/2017 1.9   07/18/2017 1.7     Sodium (mmol/L)   Date Value   12/30/2022 142   12/29/2022 137   12/28/2022 139   04/20/2020 139   10/07/2019 131 (L)   02/20/2019 137    Renal  Urea Nitrogen (mg/dL)   Date Value   12/30/2022 61.0 (H)   12/29/2022 46 (H)   12/28/2022 62 (H)   12/27/2022 52 (H)   04/20/2020 23   10/07/2019 18   02/20/2019 20     Creatinine (mg/dL)   Date Value   12/30/2022 3.02 (H)   12/29/2022 2.40 (H)   12/28/2022 3.15 (H)   04/20/2020 1.06   10/07/2019 1.01   02/20/2019 1.08     Additional  Triglycerides (mg/dL)   Date Value   12/21/2022 106   11/20/2022 94   04/20/2020 192 (H)   10/31/2017 92   03/01/2016 82     Triglycerides (External) (mg/dL)   Date Value   08/01/2019 177 (H)   01/08/2019 135     Ketones Urine (mg/dL)   Date Value   11/25/2022 Negative   10/31/2017 5 (A)        MEDICATIONS  Medications reviewed    sodium chloride 0.9%  250 mL Intravenous Once in dialysis/CRRT     sodium chloride 0.9%  300 mL Hemodialysis Machine Once     acetaminophen  975 mg Per Feeding Tube  Q8H BIJU    Or     acetaminophen  650 mg Rectal Q8H BIJU     acetylcysteine  2 mL Nebulization BID     ampicillin-sulbactam  3 g Intravenous Q24H     apixaban ANTICOAGULANT  5 mg Oral or Feeding Tube BID     B and C vitamin Complex with folic acid  5 mL Per Feeding Tube Daily     carboxymethylcellulose PF  1 drop Both Eyes TID     chlorhexidine  15 mL Mouth/Throat BID     epoetin mirta-epbx  10,000 Units Subcutaneous Once per day on Mon Wed Fri     folic acid  1 mg Oral or Feeding Tube Daily     sodium chloride (PF) 0.9%  10 mL Intracatheter Once in dialysis/CRRT    Followed by     heparin  1.3-2.6 mL Intracatheter Once in dialysis/CRRT     sodium chloride (PF) 0.9%  10 mL Intracatheter Once in dialysis/CRRT    Followed by     heparin  1.3-2.6 mL Intracatheter Once in dialysis/CRRT     insulin aspart  1-6 Units Subcutaneous Q4H BIJU     insulin glargine  20 Units Subcutaneous Daily     ipratropium - albuterol 0.5 mg/2.5 mg/3 mL  3 mL Nebulization 4x daily     lacosamide  100 mg Oral or Feeding Tube BID     lactulose  20 g Oral or Feeding Tube TID     levETIRAcetam  1,000 mg Oral or Feeding Tube Q24H     micafungin  100 mg Intravenous Q24H     midodrine  10 mg Oral or Feeding Tube TID w/meals     - MEDICATION INSTRUCTIONS -   Does not apply Once     nystatin  500,000 Units Swish & Swallow 4x Daily     pantoprazole  40 mg Per Feeding Tube BID     predniSONE  2.5 mg Oral Daily     QUEtiapine  50 mg Oral or Feeding Tube At Bedtime     rifaximin  550 mg Per Feeding Tube BID     sodium chloride (PF)  9 mL Intracatheter During Dialysis/CRRT (from stock)     sodium chloride (PF)  9 mL Intracatheter During Dialysis/CRRT (from stock)     tacrolimus  1 mg Oral or Feeding Tube BID        dextrose       - MEDICATION INSTRUCTIONS -        sodium chloride 0.9%, acetaminophen **OR** acetaminophen, albuterol, alteplase, alteplase, artificial saliva, bisacodyl, dextrose, glucose **OR** dextrose **OR** glucagon, glucose **OR** dextrose  **OR** glucagon, docusate sodium **OR** docusate, melatonin, miconazole, mineral oil-hydrophilic petrolatum, ondansetron, - MEDICATION INSTRUCTIONS -, sodium chloride (PF), sodium chloride (PF)     ASSESSED NUTRITION NEEDS PER APPROVED PRACTICE GUIDELINES:  Dosing Weight 93.2 kg (current weight)  Estimated Energy Needs: 9805-2531 kcals (30-35 Kcal/Kg)  Justification: repletion  Estimated Protein Needs: 112-140 grams protein (1.2-1.5 g pro/Kg)  Justification: hypercatabolism with critical illness, dialysis and preservation of lean body mass  Estimated Fluid Needs: per provider pending fluid status    NUTRITION DIAGNOSIS:  Inadequate enteral nutrition infusion related to decompensated clinical status as evidenced by meeting <50% protein and calorie needs x9 days.    Malnutrition related to acute illness as evidenced by severe muscle wasting, subcutaneous fat wasting, meeting <50% needs >7 days, 13% weight loss in 2 months.    NUTRITION INTERVENTIONS  Recommendations / Nutrition Prescription  See top of note.    Implementation  Nutrition education: Introduced self and role of RD, explained plan to continue current TF regimen  EN Composition, EN Schedule and Feeding Tube Flush    Nutrition Goals  TF to meet % nutrition needs  Maintain dry weight  BG WNL    MONITORING AND EVALUATION:  Progress towards goals will be monitored and evaluated per protocol and Practice Guidelines      Dinorah Rae RDN, LD  Clinical Dietitian

## 2022-12-31 ENCOUNTER — APPOINTMENT (OUTPATIENT)
Dept: OCCUPATIONAL THERAPY | Facility: CLINIC | Age: 57
DRG: 207 | End: 2022-12-31
Attending: HOSPITALIST
Payer: COMMERCIAL

## 2022-12-31 ENCOUNTER — APPOINTMENT (OUTPATIENT)
Dept: SPEECH THERAPY | Facility: CLINIC | Age: 57
DRG: 207 | End: 2022-12-31
Attending: HOSPITALIST
Payer: COMMERCIAL

## 2022-12-31 ENCOUNTER — APPOINTMENT (OUTPATIENT)
Dept: PHYSICAL THERAPY | Facility: CLINIC | Age: 57
DRG: 207 | End: 2022-12-31
Attending: HOSPITALIST
Payer: COMMERCIAL

## 2022-12-31 LAB
ALBUMIN SERPL BCG-MCNC: 2.2 G/DL (ref 3.5–5.2)
ANION GAP SERPL CALCULATED.3IONS-SCNC: 9 MMOL/L (ref 7–15)
BACTERIA BLD CULT: NO GROWTH
BACTERIA BLD CULT: NO GROWTH
BUN SERPL-MCNC: 44.1 MG/DL (ref 6–20)
CALCIUM SERPL-MCNC: 8 MG/DL (ref 8.6–10)
CHLORIDE SERPL-SCNC: 99 MMOL/L (ref 98–107)
CREAT SERPL-MCNC: 2.23 MG/DL (ref 0.67–1.17)
DEPRECATED HCO3 PLAS-SCNC: 29 MMOL/L (ref 22–29)
GFR SERPL CREATININE-BSD FRML MDRD: 33 ML/MIN/1.73M2
GLUCOSE BLDC GLUCOMTR-MCNC: 125 MG/DL (ref 70–99)
GLUCOSE BLDC GLUCOMTR-MCNC: 137 MG/DL (ref 70–99)
GLUCOSE BLDC GLUCOMTR-MCNC: 144 MG/DL (ref 70–99)
GLUCOSE BLDC GLUCOMTR-MCNC: 146 MG/DL (ref 70–99)
GLUCOSE BLDC GLUCOMTR-MCNC: 171 MG/DL (ref 70–99)
GLUCOSE BLDC GLUCOMTR-MCNC: 172 MG/DL (ref 70–99)
GLUCOSE SERPL-MCNC: 150 MG/DL (ref 70–99)
PHOSPHATE SERPL-MCNC: 2.1 MG/DL (ref 2.5–4.5)
POTASSIUM SERPL-SCNC: 3.7 MMOL/L (ref 3.4–5.3)
SODIUM SERPL-SCNC: 137 MMOL/L (ref 136–145)

## 2022-12-31 PROCEDURE — 250N000012 HC RX MED GY IP 250 OP 636 PS 637: Performed by: HOSPITALIST

## 2022-12-31 PROCEDURE — 97110 THERAPEUTIC EXERCISES: CPT | Mod: GO | Performed by: OCCUPATIONAL THERAPIST

## 2022-12-31 PROCEDURE — 92507 TX SP LANG VOICE COMM INDIV: CPT | Mod: GN

## 2022-12-31 PROCEDURE — 97535 SELF CARE MNGMENT TRAINING: CPT | Mod: GO | Performed by: OCCUPATIONAL THERAPIST

## 2022-12-31 PROCEDURE — 250N000009 HC RX 250: Performed by: HOSPITALIST

## 2022-12-31 PROCEDURE — 999N000009 HC STATISTIC AIRWAY CARE

## 2022-12-31 PROCEDURE — 250N000011 HC RX IP 250 OP 636: Performed by: HOSPITALIST

## 2022-12-31 PROCEDURE — 250N000013 HC RX MED GY IP 250 OP 250 PS 637: Performed by: HOSPITALIST

## 2022-12-31 PROCEDURE — 258N000003 HC RX IP 258 OP 636: Performed by: HOSPITALIST

## 2022-12-31 PROCEDURE — 250N000009 HC RX 250: Performed by: NURSE PRACTITIONER

## 2022-12-31 PROCEDURE — 94640 AIRWAY INHALATION TREATMENT: CPT | Mod: 76

## 2022-12-31 PROCEDURE — 97110 THERAPEUTIC EXERCISES: CPT | Mod: GP

## 2022-12-31 PROCEDURE — 120N000017 HC R&B RESPIRATORY CARE

## 2022-12-31 PROCEDURE — 250N000013 HC RX MED GY IP 250 OP 250 PS 637: Performed by: PHYSICIAN ASSISTANT

## 2022-12-31 PROCEDURE — 94003 VENT MGMT INPAT SUBQ DAY: CPT

## 2022-12-31 PROCEDURE — 94640 AIRWAY INHALATION TREATMENT: CPT

## 2022-12-31 PROCEDURE — 999N000253 HC STATISTIC WEANING TRIALS

## 2022-12-31 PROCEDURE — 99232 SBSQ HOSP IP/OBS MODERATE 35: CPT | Performed by: HOSPITALIST

## 2022-12-31 PROCEDURE — 82040 ASSAY OF SERUM ALBUMIN: CPT | Performed by: HOSPITALIST

## 2022-12-31 PROCEDURE — 999N000157 HC STATISTIC RCP TIME EA 10 MIN

## 2022-12-31 RX ORDER — QUETIAPINE FUMARATE 25 MG/1
25 TABLET, FILM COATED ORAL ONCE
Status: COMPLETED | OUTPATIENT
Start: 2022-12-31 | End: 2022-12-31

## 2022-12-31 RX ADMIN — IPRATROPIUM BROMIDE AND ALBUTEROL SULFATE 3 ML: 2.5; .5 SOLUTION RESPIRATORY (INHALATION) at 12:39

## 2022-12-31 RX ADMIN — PREDNISONE 2.5 MG: 5 SOLUTION ORAL at 08:13

## 2022-12-31 RX ADMIN — POTASSIUM & SODIUM PHOSPHATES POWDER PACK 280-160-250 MG 1 PACKET: 280-160-250 PACK at 17:15

## 2022-12-31 RX ADMIN — INSULIN ASPART 1 UNITS: 100 INJECTION, SOLUTION INTRAVENOUS; SUBCUTANEOUS at 12:04

## 2022-12-31 RX ADMIN — MIDODRINE HYDROCHLORIDE 10 MG: 5 TABLET ORAL at 08:11

## 2022-12-31 RX ADMIN — APIXABAN 5 MG: 2.5 TABLET, FILM COATED ORAL at 21:23

## 2022-12-31 RX ADMIN — Medication 1 DROP: at 14:14

## 2022-12-31 RX ADMIN — POTASSIUM & SODIUM PHOSPHATES POWDER PACK 280-160-250 MG 1 PACKET: 280-160-250 PACK at 11:54

## 2022-12-31 RX ADMIN — ACETAMINOPHEN 975 MG: 325 TABLET, FILM COATED ORAL at 21:23

## 2022-12-31 RX ADMIN — LEVETIRACETAM 1000 MG: 100 SOLUTION ORAL at 21:21

## 2022-12-31 RX ADMIN — LACTULOSE 20 G: 20 SOLUTION ORAL at 08:11

## 2022-12-31 RX ADMIN — NYSTATIN 500000 UNITS: 100000 SUSPENSION ORAL at 21:21

## 2022-12-31 RX ADMIN — QUETIAPINE FUMARATE 50 MG: 50 TABLET ORAL at 21:26

## 2022-12-31 RX ADMIN — CHLORHEXIDINE GLUCONATE 0.12% ORAL RINSE 15 ML: 1.2 LIQUID ORAL at 08:11

## 2022-12-31 RX ADMIN — TORSEMIDE 100 MG: 100 TABLET ORAL at 08:11

## 2022-12-31 RX ADMIN — MICAFUNGIN SODIUM 100 MG: 100 INJECTION, POWDER, LYOPHILIZED, FOR SOLUTION INTRAVENOUS at 17:46

## 2022-12-31 RX ADMIN — Medication 40 MG: at 21:21

## 2022-12-31 RX ADMIN — ACETAMINOPHEN 650 MG: 650 SOLUTION ORAL at 17:13

## 2022-12-31 RX ADMIN — IPRATROPIUM BROMIDE AND ALBUTEROL SULFATE 3 ML: 2.5; .5 SOLUTION RESPIRATORY (INHALATION) at 15:44

## 2022-12-31 RX ADMIN — INSULIN ASPART 1 UNITS: 100 INJECTION, SOLUTION INTRAVENOUS; SUBCUTANEOUS at 00:34

## 2022-12-31 RX ADMIN — CHLORHEXIDINE GLUCONATE 0.12% ORAL RINSE 15 ML: 1.2 LIQUID ORAL at 21:21

## 2022-12-31 RX ADMIN — ACETYLCYSTEINE 2 ML: 200 SOLUTION ORAL; RESPIRATORY (INHALATION) at 07:57

## 2022-12-31 RX ADMIN — LACOSAMIDE 100 MG: 10 SOLUTION ORAL at 08:11

## 2022-12-31 RX ADMIN — WHITE PETROLATUM: 1.75 OINTMENT TOPICAL at 08:13

## 2022-12-31 RX ADMIN — LACTULOSE 20 G: 20 SOLUTION ORAL at 14:14

## 2022-12-31 RX ADMIN — POTASSIUM & SODIUM PHOSPHATES POWDER PACK 280-160-250 MG 1 PACKET: 280-160-250 PACK at 14:14

## 2022-12-31 RX ADMIN — ACETYLCYSTEINE 2 ML: 200 SOLUTION ORAL; RESPIRATORY (INHALATION) at 20:01

## 2022-12-31 RX ADMIN — NYSTATIN 500000 UNITS: 100000 SUSPENSION ORAL at 17:14

## 2022-12-31 RX ADMIN — IPRATROPIUM BROMIDE AND ALBUTEROL SULFATE 3 ML: 2.5; .5 SOLUTION RESPIRATORY (INHALATION) at 07:56

## 2022-12-31 RX ADMIN — Medication 40 MG: at 08:10

## 2022-12-31 RX ADMIN — RIFAXIMIN 550 MG: 550 TABLET ORAL at 08:15

## 2022-12-31 RX ADMIN — INSULIN ASPART 1 UNITS: 100 INJECTION, SOLUTION INTRAVENOUS; SUBCUTANEOUS at 04:21

## 2022-12-31 RX ADMIN — TACROLIMUS 1 MG: 5 CAPSULE ORAL at 08:11

## 2022-12-31 RX ADMIN — QUETIAPINE FUMARATE 25 MG: 25 TABLET ORAL at 23:11

## 2022-12-31 RX ADMIN — NYSTATIN 500000 UNITS: 100000 SUSPENSION ORAL at 14:15

## 2022-12-31 RX ADMIN — ACETAMINOPHEN 650 MG: 650 SOLUTION ORAL at 08:10

## 2022-12-31 RX ADMIN — FOLIC ACID 1 MG: 1 TABLET ORAL at 08:12

## 2022-12-31 RX ADMIN — INSULIN ASPART 1 UNITS: 100 INJECTION, SOLUTION INTRAVENOUS; SUBCUTANEOUS at 08:27

## 2022-12-31 RX ADMIN — MIDODRINE HYDROCHLORIDE 10 MG: 5 TABLET ORAL at 17:14

## 2022-12-31 RX ADMIN — MIDODRINE HYDROCHLORIDE 10 MG: 5 TABLET ORAL at 12:01

## 2022-12-31 RX ADMIN — NYSTATIN 500000 UNITS: 100000 SUSPENSION ORAL at 08:11

## 2022-12-31 RX ADMIN — Medication 1 DROP: at 08:12

## 2022-12-31 RX ADMIN — IPRATROPIUM BROMIDE AND ALBUTEROL SULFATE 3 ML: 2.5; .5 SOLUTION RESPIRATORY (INHALATION) at 20:01

## 2022-12-31 RX ADMIN — INSULIN GLARGINE 20 UNITS: 100 INJECTION, SOLUTION SUBCUTANEOUS at 08:15

## 2022-12-31 RX ADMIN — ACETAMINOPHEN 650 MG: 650 SOLUTION ORAL at 21:21

## 2022-12-31 RX ADMIN — Medication 6 MG: at 02:32

## 2022-12-31 RX ADMIN — TACROLIMUS 1 MG: 5 CAPSULE ORAL at 17:30

## 2022-12-31 RX ADMIN — LIDOCAINE HYDROCHLORIDE: 20 JELLY TOPICAL at 00:30

## 2022-12-31 RX ADMIN — ACETAMINOPHEN 975 MG: 325 TABLET, FILM COATED ORAL at 06:16

## 2022-12-31 RX ADMIN — LACOSAMIDE 100 MG: 10 SOLUTION ORAL at 21:23

## 2022-12-31 RX ADMIN — Medication 1 DROP: at 21:24

## 2022-12-31 RX ADMIN — Medication 5 ML: at 08:12

## 2022-12-31 RX ADMIN — LACTULOSE 20 G: 20 SOLUTION ORAL at 21:25

## 2022-12-31 RX ADMIN — APIXABAN 5 MG: 2.5 TABLET, FILM COATED ORAL at 08:12

## 2022-12-31 RX ADMIN — AMOXICILLIN AND CLAVULANATE POTASSIUM 500 MG: 400; 57 POWDER, FOR SUSPENSION ORAL at 21:43

## 2022-12-31 RX ADMIN — RIFAXIMIN 550 MG: 550 TABLET ORAL at 21:23

## 2022-12-31 ASSESSMENT — ACTIVITIES OF DAILY LIVING (ADL)
ADLS_ACUITY_SCORE: 60
ADLS_ACUITY_SCORE: 62
ADLS_ACUITY_SCORE: 60
ADLS_ACUITY_SCORE: 60
ADLS_ACUITY_SCORE: 62
ADLS_ACUITY_SCORE: 60
ADLS_ACUITY_SCORE: 62
ADLS_ACUITY_SCORE: 62
ADLS_ACUITY_SCORE: 60
ADLS_ACUITY_SCORE: 60

## 2022-12-31 NOTE — PLAN OF CARE
"  Problem: Plan of Care - These are the overarching goals to be used throughout the patient stay.    Goal: Optimal Comfort and Wellbeing  Outcome: Progressing  Intervention: Provide Person-Centered Care    Problem: Pain Acute  Goal: Optimal Pain Control and Function  Outcome: Progressing    Pt.has trach and on mechanical ventilator this shift. Pt. Unable to speak but mouths words, nods head \"yes\" or \"no\" questions, and points. Pt. C/o 7/10 generalized pain. Pt received scheduled tylenol with effectiveness. Pt. Abdomen distended and firm. Pt. Has rectal tube in place. BM is loose and watery. Pt. Continues on potassium and phosphorus protocols with recheck this AM. Pt. Has ecchymosis throughout body more prominent upper right chest and bilateral arms. Pt. Awake unable to get to sleep. Pt. Received PRN melatonin 6mg at 0232 with effectiveness as pt noted to be sleeping. Blood sugar 172 and 171 this shift.                       "

## 2022-12-31 NOTE — PLAN OF CARE
Problem: Mechanical Ventilation Invasive  Goal: Effective Communication  Outcome: Adequate for Care Transition   Goal Outcome Evaluation:  RT PROGRESS NOTE     DATA:     CURRENT SETTINGS:             TRACH TYPE / SIZE:  6 Shiely             MODE:   AC 12, 405, peep 5             FIO2:   26       ACTION:             THERAPIES:   Mucomyst/ Duoneb             SUCTION:                           FREQUENCY:  4x                        AMOUNT:   Copious                         CONSISTENCY:   thick                        COLOR:   tan             SPONTANEOUS COUGH EFFORT/STRENGTH OF EFFORT (not elicited by suctioning): good                              WEANING PHASE:   2                        WEAN MODE:    PS                        WEAN TIME:   days shift                        END WEAN REASON:   SOB     RESPONSE:             BS:   Course             VITAL SIGNS:   Vital signs:  Temp: 97.6  F (36.4  C) Temp src: Oral BP: 103/68 Pulse: 100   Resp: 19 SpO2: 100 % O2 Device: Mechanical Ventilator Oxygen Delivery: 40 LPM   Weight: 93.3 kg (205 lb 11.2 oz)  Estimated body mass index is 27.9 kg/m  as calculated from the following:    Height as of 11/12/22: 1.829 m (6').    Weight as of this encounter: 93.3 kg (205 lb 11.2 oz).                  EMOTIONAL NEEDS / CONCERNS:  WOB               RISK FOR SELF DECANNULATION:  yes                        RISK DUE TO:  anxiety                        INTERVENTION/S IN PLACE IS/ARE:  monitor       NOTE / PLAN:   Patient has been calm and resting throughout the night with no distress. Increased secretions noted.Wean in the AM as tolerate and monitor.

## 2022-12-31 NOTE — PLAN OF CARE
Problem: Mechanical Ventilation Invasive  Goal: Optimal Device Function  Outcome: Progressing  Goal: Mechanical Ventilation Liberation  Outcome: Progressing  Goal: Absence of Device-Related Skin and Tissue Injury  Outcome: Progressing  Goal: Absence of Ventilator-Induced Lung Injury  Outcome: Progressing   RT PROGRESS NOTE     DATA:     CURRENT SETTINGS: AC 12, 450, +5, 26% @Night             TRACH TYPE / SIZE: # 6 SHILEY  XLT  PROXIMAL Placed on 11/29/2022             MODE: AC             FIO2:  26%     ACTION:             THERAPIES: Duoneb/Mucomyst QID              SUCTION:                           FREQUENCY: X4                        AMOUNT: Small to Moderate                         CONSISTENCY: thick                        COLOR: Pale yellow /light blue              SPONTANEOUS COUGH EFFORT/STRENGTH OF EFFORT (not elicited by suctioning):Strong                              WEANING PHASE: 1 and 2                         WEAN MODE: 40%@40 L/MIN TM/cuff up and CPAP 5/PS 8                        WEAN TIME:3 hrs on TM X2,  3 hrs on PS 8 and Since 18:35                        END WEAN REASON: Ongoing     RESPONSE:             BS:  Coarse             VITAL SIGNS:Sat 100%, HR ,RR 21-25             EMOTIONAL NEEDS / CONCERNS:  no              RISK FOR SELF DECANNULATION:                          RISK DUE TO:                          INTERVENTION/S IN PLACE IS/ARE:

## 2022-12-31 NOTE — PROGRESS NOTES
"Pt. Was bladder scanned per MD order. Writer bladder scanned for 966cc. Pt. Has abdominal ascites. Charge Nurse also scanned patient for 788cc. Pt. Attempted to void with urinal several times with use of urinal which was unsuccessful. Writer attempted to straight catheterize patient and during the process, pt pushed writer's hand away and held penis stating \"No\" Writer educated patient on importance to get straight catheterize. Pt. Continued to refuse. Charge nurse attempted to talk with patient and patient continued to refuse. Pt. Allowed SWAT RN to catheterize patient. MD called for order for   lidocaine and patient was straight catheterized with results of only 40cc of concentrated urine.   "

## 2022-12-31 NOTE — PROGRESS NOTES
[facility]    Medicine Progress Note - Hospitalist Service    Date of Admission:  12/29/2022    History of Present Illness  Blanco Osborne is an 57 year old male who is transferred from [facility] for rehab and continuation of IV antibiotic treatment.  Patient has multiple medical problems including history of liver transplant 2016, PAF on chronic anticoagulation, history of DM2, CVA, HTN, CHRISTIAN on BiPAP, and history of alcohol abuse in the past causing liver damage ending with liver transplant.  About 6 weeks ago he had respiratory arrest and was diagnosed with infection with parainfluenza and strep pneumoniae and acute on chronic renal insufficiency ending with CRF needing 3/week hemodialysis.  During this period he was diagnosed with cirrhosis of transplanted liver and hyperammonia.  Currently he is on Lactulose and Rifaximin.        On 12/14/22, due to CIP, he was transferred to [facility] for the first time with Trach, PEG and Rt chest tube.    On 12/16/2022 patient was transferred back to [facility] due to respiratory insufficiency secondary to hydropneumothorax and drainage of 900 cc bloody pleural effusion, bloody stool and transfusion of 2 units PRBC.      On 12/20/2022 he had another episode of PEA arrest followed by CPR x2 minutes and possibly had seizure activity in [facility]. 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  He has been on ventilator and has improved to trach dome tolerating up to 6 hours so far. He had chest tube which was removed.    He had SBP with growth of lactobacillus and is currently on Unasyn which will be switched to Augmentin orally through 1/12/2023.    Pleural effusion grew Candida and is on micafungin.  He is also on  due to cirrhosis and hyperammonia.  He is anemic and is on Retacrit.   He is on lacosamide and Keppra due to history of seizure.  There is  question of him drinking again causing him cirrhosis of the transplanted liver.      Assessment & Plan   Principal Problem:    Acute on chronic respiratory failure with hypoxia (H)  Active Problems:    Acquired immunocompromised state (H)    Cirrhosis of liver (H)    NAFLD (nonalcoholic fatty liver disease)    Liver transplanted (H)    Immunosuppression (H)    Acute kidney injury (H)    Spontaneous bacterial peritonitis (H)    Critical illness myopathy    History of sudden cardiac arrest, PEA    Dependent on hemodialysis (H)    Pulmonary:  Acute now chronic respiratory failure requiring tracheostomy..  Initial event was parainfluenza and strep pneumo pneumonia.  Had failed extubation x2 and tracheostomy performed last hospitalization.  Had additional complications of bilateral pneumothoraces.  (Most recently was a hydropneumothorax where fluid grew Candida..  Chest tube was placed and removed during this hospitalization.  Had decent tolerance for pressure support and trach dome but after few hours and trach dome 1227 had a pretty significant episode of desaturation.   Plan:  1.  We will alternate trach dome and pressure support and try to wean.  Pulmonary is consulted.   2.  At least 4 weeks of antimicrobial for Candida detailed in ID section    Cardiovascular system:  PEA arrest prior to his previous admission and 1 episode of PEA associated with desaturation on 1220.  No structural cardiac disease but does have A. Fib which neurologist thought might have been contributory to his embolic strokes.  Anticoagulation been restarted with stable labs although high risk for bleeding seconday to thrombocytopenia.  Also has developed baseline hypotension and is on midodrine 10 mg 3 times daily.  Plan:  Continue Eliquis.  Continue current midodrine dose     CNS:  1. Seizure after hypoxic event.  This is in the context of baseline multifactorial encephalopathy secondary to his ongoing critical illness and prior cardiac  arrests and prior strokes and cirrhosis with hepatic encephalopathy. MRI of head of 12/20/22 reveals no PRES or leptomeningeal enhancement but scattered.  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   Plan:  1.  Keppra and vimpat indefinitely.  Neurology follow-up.  2.  Anticoagulation indefinitely for secondary stroke prevention.  3. Tylenol TID for headache.        Renal/Electrolytes:  1. LORETTA, ?CRI: Now on iHD.  No return of renal function.  - MW schedule      ID:  1. 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 4.  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  Have had some conversations within our group and with transplant ID and general infectious disease.  General consensus is that ganciclovir probably could be discontinued  2. H/o Strep/Parainfluenza infection last hospitalization. Completed treatment course  3. 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  4.  Lactobacillus growing in the broth 4 days after paracentesis.  Cell count was very low.  5.  Candida in left pleural fluid cultures.  Sensitivities not resulted.  6.  Immunosuppressed on tacrolimus.  Discussed with transplant service at the [facility] who felt that given the absence of rejection on his most recent biopsy and ongoing issues with infection continuing with 1 twice daily tacrolimus is the best current option.  They have seen his subtherapeutic trough levels.  Plan:  1.  Confirm with transplant ID whether or not we can stop ganciclovir.  2.  Unasyn for 2 weeks total.  Change to Augmentin through 1/12/2023.  3.  At least 4 weeks of antimicrobial for the Candida.  If sensitive to fluconazole could de-escalate from micafungin.  4.  Continue tacrolimus as above and we are tapering steroids     GI/:  Post transplant cirrhosis.  Main manifestations of this are hepatic encephalopathy and ascites.  Hepatologist at [facility] felt that most likely reason for the cirrhosis was metabolic syndrome or alcohol intake.  There was  no evidence of rejection on biopsy and there was some evidence of regeneration. Paracentesis done on 12/22/2022 with lack of bacillus in the broth indicating SBP low cell count was very low  Plan:  1.  Intermittent paracentesis as needed.  2.  Getting 1 mg  tacrolimus BID and tapering steroids   3. Lactulose and Rifaximin as above  4.  Treat SBP for 2 weeks.  5.  He has an appointment in April with Dr. Simms if he is out of the hospital.     Endocrine:  1. DM.  Titrating insulin. He needs supplemental coverage.  2. He is on steroids which we are weaning  Plan:  Continue with current regimen.      Nutrition:  At goal tube feeds via PEG tube     Heme/Onc:  1. Pancytopenia; possibly due cirrhosis. WBC count has improved . Chronic splenomegaly was present prior to liver transplant but has not regressed.   2. Needs anticoagulation for AFib.   Plan:  1.  Continue Eliquis      ICU prophylaxis:      DVT PPX: Eliquis      Restraints needed: No     Stress ulcer: IV PPI     Central line: Needed for IV access/Meds.     Code Status: Full     Diet: Adult Formula Drip Feeding: Continuous Novasource Renal; Gastrostomy; Goal Rate: 60; mL/hr    DVT Prophylaxis: DOAC  Nixon Catheter: Not present  Central Lines: PRESENT  PICC 11/24/22 Triple Lumen Right Brachial vein medial Access-Site Assessment: WDL  CVC Double Lumen Right External jugular Tunneled-Site Assessment: WDL  Cardiac Monitoring: None  Code Status: Full Code      Disposition Plan     Expected Discharge Date: 01/01/2023    Discharge Delays: Complex Discharge    Discharge Comments: Vent. Trach. Phase 1. PEG.  Rectal Tube.        The patient's care was discussed with the Bedside Nurse, Patient and Patient's Family.    Edgar Fritz MD  Hospitalist Service  [facility]  Securely message with the Vocera Web Console (learn more here)  Text page via [employer] Paging/Directory         Clinically Significant Risk Factors              # Hypoalbuminemia: Lowest albumin = 1.8 g/dL at  12/29/2022  4:40 AM, will monitor as appropriate             # Severe Malnutrition: based on nutrition assessment, PRESENT ON ADMISSION       ______________________________________________________________________    Interval History   Patient is sitting in his bed and is in no distress.  He has tracheostomy and is on vent.  He is comfortable.  He did not verbalize any concerns or discomfort.  Patient denies any chest pain or shortness of breath or abdominal pain or nausea or vomiting or current headache. Is aneuric and had 40 ml of urine while bladder scanner reads >900 ml (due to ascites). Bladder scan order was changed to daily and PRN.     Data reviewed today: I reviewed all medications, new labs and imaging results over the last 24 hours. I personally reviewed no images or EKG's today.    Physical Exam   Vital Signs: Temp: 98.1  F (36.7  C) Temp src: Oral BP: 135/88 Pulse: 96   Resp: 24 SpO2: 100 % O2 Device: Trach dome Oxygen Delivery: 40 LPM  Weight: 205 lbs 11.2 oz  Patient is sitting in bed.  Is in no acute distress.  Looks pale and chronically ill.  He communicates and is pleasant and cooperative.  He is on ventilator through tracheostomy  HEENT: Nl oral mucosal moisture.  Pale mucosa, no icterus.  Neck: Tracheostomy in place.  Right external jugular CVC.  Heart: Regular rate and rhythm, no murmur  Abdomen: Moderately enlarged due to ascites.  Nontender.  No mass or organomegaly noted.  Patient has rectal tube.  PEG tube in place.  : Deferred  Extremities: No edema.  PICC line in right brachial artery.  Neurological exam: Patient is alert and oriented.  Has normal behavior.  Gross cranial nerve neurological exam did not show any major abnormality.  Left  is weaker than the right otherwise no focal neurological deficit noted in limited neurological exam. Gait and station were not examined.    Data   Recent Labs   Lab 12/31/22  1734 12/31/22  1154 12/31/22  0826 12/31/22  0610 12/30/22  1628  12/30/22  1454 12/30/22  0817 12/30/22  0651 12/29/22  0549 12/29/22  0440 12/27/22  0429 12/27/22  0420 12/26/22  0443 12/26/22  0436 12/24/22  1944 12/24/22  1808   WBC  --   --   --   --   --   --   --   --   --   --   --  7.5  --  4.8  --  5.8   HGB  --   --   --   --   --   --   --   --   --   --   --  8.8*  --  8.1*  --  8.4*   MCV  --   --   --   --   --   --   --   --   --   --   --  107*  --  103*  --  106*   PLT  --   --   --   --   --   --   --   --   --   --   --  63*  --  55*  --  58*   NA  --   --   --  137  --   --   --  142  --  137   < > 139  --   --    < >  --    POTASSIUM  --   --   --  3.7  --  4.1  --  3.4  --  3.4   < > 3.1*  --   --    < >  --    CHLORIDE  --   --   --  99  --   --   --  102  --  103   < > 102  --   --    < >  --    CO2  --   --   --  29  --   --   --  30*  --  31   < > 29  --   --    < >  --    BUN  --   --   --  44.1*  --   --   --  61.0*  --  46*   < > 52*  --   --    < >  --    CR  --   --   --  2.23*  --   --   --  3.02*  --  2.40*   < > 2.61*  --   --    < >  --    ANIONGAP  --   --   --  9  --   --   --  10  --  3   < > 8  --   --    < >  --    KELLY  --   --   --  8.0*  --   --   --  8.1*  --  7.8*   < > 8.1*  --   --    < >  --    * 146* 144* 150*   < >  --    < > 134*   < > 160*   < > 127*   < >  --    < >  --    ALBUMIN  --   --   --  2.2*  --   --   --  2.2*  --  1.8*   < > 2.1*  --   --    < >  --     < > = values in this interval not displayed.     Medications     dextrose       - MEDICATION INSTRUCTIONS -         acetaminophen  975 mg Per Feeding Tube Q8H BIJU    Or     acetaminophen  650 mg Rectal Q8H BIJU     acetylcysteine  2 mL Nebulization BID     amoxicillin-clavulanate  500 mg Per Feeding Tube QPM     [START ON 1/2/2023] amoxicillin-clavulanate  500 mg Per Feeding Tube Once per day on Mon Wed Fri     apixaban ANTICOAGULANT  5 mg Oral or Feeding Tube BID     B and C vitamin Complex with folic acid  5 mL Per Feeding Tube Daily     carboxymethylcellulose  PF  1 drop Both Eyes TID     chlorhexidine  15 mL Mouth/Throat BID     epoetin mirta-epbx  10,000 Units Subcutaneous Once per day on Mon Wed Fri     folic acid  1 mg Oral or Feeding Tube Daily     insulin aspart  1-6 Units Subcutaneous Q4H BIJU     insulin glargine  20 Units Subcutaneous Daily     ipratropium - albuterol 0.5 mg/2.5 mg/3 mL  3 mL Nebulization 4x daily     lacosamide  100 mg Oral or Feeding Tube BID     lactulose  20 g Oral or Feeding Tube TID     levETIRAcetam  1,000 mg Oral or Feeding Tube Q24H     micafungin  100 mg Intravenous Q24H     midodrine  10 mg Oral or Feeding Tube TID w/meals     mineral oil-hydrophilic petrolatum   Topical Daily     nystatin  500,000 Units Swish & Swallow 4x Daily     pantoprazole  40 mg Per Feeding Tube BID     QUEtiapine  50 mg Oral or Feeding Tube At Bedtime     rifaximin  550 mg Per Feeding Tube BID     sodium chloride (PF)  9 mL Intracatheter During Dialysis/CRRT (from stock)     sodium chloride (PF)  9 mL Intracatheter During Dialysis/CRRT (from stock)     tacrolimus  1 mg Oral or Feeding Tube BID     torsemide  100 mg Per Feeding Tube Daily

## 2023-01-01 LAB
ALBUMIN SERPL BCG-MCNC: 2.4 G/DL (ref 3.5–5.2)
ANION GAP SERPL CALCULATED.3IONS-SCNC: 11 MMOL/L (ref 7–15)
BUN SERPL-MCNC: 61.1 MG/DL (ref 6–20)
CALCIUM SERPL-MCNC: 8.6 MG/DL (ref 8.6–10)
CHLORIDE SERPL-SCNC: 101 MMOL/L (ref 98–107)
CREAT SERPL-MCNC: 2.72 MG/DL (ref 0.67–1.17)
DEPRECATED HCO3 PLAS-SCNC: 30 MMOL/L (ref 22–29)
GFR SERPL CREATININE-BSD FRML MDRD: 26 ML/MIN/1.73M2
GLUCOSE BLDC GLUCOMTR-MCNC: 137 MG/DL (ref 70–99)
GLUCOSE BLDC GLUCOMTR-MCNC: 139 MG/DL (ref 70–99)
GLUCOSE BLDC GLUCOMTR-MCNC: 140 MG/DL (ref 70–99)
GLUCOSE BLDC GLUCOMTR-MCNC: 147 MG/DL (ref 70–99)
GLUCOSE BLDC GLUCOMTR-MCNC: 150 MG/DL (ref 70–99)
GLUCOSE BLDC GLUCOMTR-MCNC: 151 MG/DL (ref 70–99)
GLUCOSE BLDC GLUCOMTR-MCNC: 152 MG/DL (ref 70–99)
GLUCOSE BLDC GLUCOMTR-MCNC: 153 MG/DL (ref 70–99)
GLUCOSE SERPL-MCNC: 162 MG/DL (ref 70–99)
PHOSPHATE SERPL-MCNC: 3.3 MG/DL (ref 2.5–4.5)
POTASSIUM SERPL-SCNC: 3.7 MMOL/L (ref 3.4–5.3)
SODIUM SERPL-SCNC: 142 MMOL/L (ref 136–145)

## 2023-01-01 PROCEDURE — 250N000009 HC RX 250: Performed by: HOSPITALIST

## 2023-01-01 PROCEDURE — 999N000253 HC STATISTIC WEANING TRIALS

## 2023-01-01 PROCEDURE — 250N000013 HC RX MED GY IP 250 OP 250 PS 637: Performed by: HOSPITALIST

## 2023-01-01 PROCEDURE — 94640 AIRWAY INHALATION TREATMENT: CPT

## 2023-01-01 PROCEDURE — 250N000011 HC RX IP 250 OP 636: Performed by: HOSPITALIST

## 2023-01-01 PROCEDURE — 94640 AIRWAY INHALATION TREATMENT: CPT | Mod: 76

## 2023-01-01 PROCEDURE — 80069 RENAL FUNCTION PANEL: CPT | Performed by: HOSPITALIST

## 2023-01-01 PROCEDURE — 999N000009 HC STATISTIC AIRWAY CARE

## 2023-01-01 PROCEDURE — 258N000003 HC RX IP 258 OP 636: Performed by: HOSPITALIST

## 2023-01-01 PROCEDURE — 250N000012 HC RX MED GY IP 250 OP 636 PS 637: Performed by: HOSPITALIST

## 2023-01-01 PROCEDURE — 999N000157 HC STATISTIC RCP TIME EA 10 MIN

## 2023-01-01 PROCEDURE — 99232 SBSQ HOSP IP/OBS MODERATE 35: CPT | Performed by: HOSPITALIST

## 2023-01-01 PROCEDURE — 250N000013 HC RX MED GY IP 250 OP 250 PS 637: Performed by: PHYSICIAN ASSISTANT

## 2023-01-01 PROCEDURE — 250N000009 HC RX 250: Performed by: NURSE PRACTITIONER

## 2023-01-01 PROCEDURE — 120N000017 HC R&B RESPIRATORY CARE

## 2023-01-01 RX ADMIN — MIDODRINE HYDROCHLORIDE 10 MG: 5 TABLET ORAL at 16:01

## 2023-01-01 RX ADMIN — ACETYLCYSTEINE 2 ML: 200 SOLUTION ORAL; RESPIRATORY (INHALATION) at 08:53

## 2023-01-01 RX ADMIN — IPRATROPIUM BROMIDE AND ALBUTEROL SULFATE 3 ML: 2.5; .5 SOLUTION RESPIRATORY (INHALATION) at 11:55

## 2023-01-01 RX ADMIN — ACETAMINOPHEN 975 MG: 325 TABLET, FILM COATED ORAL at 14:09

## 2023-01-01 RX ADMIN — CHLORHEXIDINE GLUCONATE 0.12% ORAL RINSE 15 ML: 1.2 LIQUID ORAL at 09:02

## 2023-01-01 RX ADMIN — LACOSAMIDE 100 MG: 10 SOLUTION ORAL at 09:07

## 2023-01-01 RX ADMIN — IPRATROPIUM BROMIDE AND ALBUTEROL SULFATE 3 ML: 2.5; .5 SOLUTION RESPIRATORY (INHALATION) at 23:24

## 2023-01-01 RX ADMIN — ACETAMINOPHEN 650 MG: 650 SOLUTION ORAL at 23:49

## 2023-01-01 RX ADMIN — ACETAMINOPHEN 975 MG: 325 TABLET, FILM COATED ORAL at 06:15

## 2023-01-01 RX ADMIN — INSULIN ASPART 1 UNITS: 100 INJECTION, SOLUTION INTRAVENOUS; SUBCUTANEOUS at 00:34

## 2023-01-01 RX ADMIN — MICAFUNGIN SODIUM 100 MG: 100 INJECTION, POWDER, LYOPHILIZED, FOR SOLUTION INTRAVENOUS at 18:39

## 2023-01-01 RX ADMIN — ACETYLCYSTEINE 2 ML: 200 SOLUTION ORAL; RESPIRATORY (INHALATION) at 23:25

## 2023-01-01 RX ADMIN — RIFAXIMIN 550 MG: 550 TABLET ORAL at 09:08

## 2023-01-01 RX ADMIN — RIFAXIMIN 550 MG: 550 TABLET ORAL at 21:20

## 2023-01-01 RX ADMIN — ACETAMINOPHEN 975 MG: 325 TABLET, FILM COATED ORAL at 21:20

## 2023-01-01 RX ADMIN — IPRATROPIUM BROMIDE AND ALBUTEROL SULFATE 3 ML: 2.5; .5 SOLUTION RESPIRATORY (INHALATION) at 08:53

## 2023-01-01 RX ADMIN — TACROLIMUS 1 MG: 5 CAPSULE ORAL at 18:37

## 2023-01-01 RX ADMIN — INSULIN GLARGINE 20 UNITS: 100 INJECTION, SOLUTION SUBCUTANEOUS at 09:05

## 2023-01-01 RX ADMIN — NYSTATIN 500000 UNITS: 100000 SUSPENSION ORAL at 09:02

## 2023-01-01 RX ADMIN — AMOXICILLIN AND CLAVULANATE POTASSIUM 500 MG: 400; 57 POWDER, FOR SUSPENSION ORAL at 21:21

## 2023-01-01 RX ADMIN — FOLIC ACID 1 MG: 1 TABLET ORAL at 09:08

## 2023-01-01 RX ADMIN — Medication 1 DROP: at 14:09

## 2023-01-01 RX ADMIN — Medication 40 MG: at 21:20

## 2023-01-01 RX ADMIN — MIDODRINE HYDROCHLORIDE 10 MG: 5 TABLET ORAL at 09:08

## 2023-01-01 RX ADMIN — INSULIN ASPART 1 UNITS: 100 INJECTION, SOLUTION INTRAVENOUS; SUBCUTANEOUS at 04:03

## 2023-01-01 RX ADMIN — Medication 1 DROP: at 21:21

## 2023-01-01 RX ADMIN — LACTULOSE 20 G: 20 SOLUTION ORAL at 14:09

## 2023-01-01 RX ADMIN — APIXABAN 5 MG: 2.5 TABLET, FILM COATED ORAL at 21:21

## 2023-01-01 RX ADMIN — LEVETIRACETAM 1000 MG: 100 SOLUTION ORAL at 21:20

## 2023-01-01 RX ADMIN — TACROLIMUS 1 MG: 5 CAPSULE ORAL at 09:07

## 2023-01-01 RX ADMIN — WHITE PETROLATUM: 1.75 OINTMENT TOPICAL at 09:09

## 2023-01-01 RX ADMIN — NYSTATIN 500000 UNITS: 100000 SUSPENSION ORAL at 16:01

## 2023-01-01 RX ADMIN — LACTULOSE 20 G: 20 SOLUTION ORAL at 09:02

## 2023-01-01 RX ADMIN — Medication 12.5 MG: at 00:34

## 2023-01-01 RX ADMIN — Medication 1 DROP: at 09:07

## 2023-01-01 RX ADMIN — NYSTATIN 500000 UNITS: 100000 SUSPENSION ORAL at 21:20

## 2023-01-01 RX ADMIN — INSULIN ASPART 1 UNITS: 100 INJECTION, SOLUTION INTRAVENOUS; SUBCUTANEOUS at 11:46

## 2023-01-01 RX ADMIN — LACOSAMIDE 100 MG: 10 SOLUTION ORAL at 21:21

## 2023-01-01 RX ADMIN — NYSTATIN 500000 UNITS: 100000 SUSPENSION ORAL at 12:03

## 2023-01-01 RX ADMIN — QUETIAPINE FUMARATE 50 MG: 50 TABLET ORAL at 21:21

## 2023-01-01 RX ADMIN — TORSEMIDE 100 MG: 100 TABLET ORAL at 09:08

## 2023-01-01 RX ADMIN — CHLORHEXIDINE GLUCONATE 0.12% ORAL RINSE 15 ML: 1.2 LIQUID ORAL at 21:21

## 2023-01-01 RX ADMIN — INSULIN ASPART 1 UNITS: 100 INJECTION, SOLUTION INTRAVENOUS; SUBCUTANEOUS at 20:22

## 2023-01-01 RX ADMIN — APIXABAN 5 MG: 2.5 TABLET, FILM COATED ORAL at 09:08

## 2023-01-01 RX ADMIN — Medication 40 MG: at 09:01

## 2023-01-01 RX ADMIN — Medication 5 ML: at 09:07

## 2023-01-01 RX ADMIN — MIDODRINE HYDROCHLORIDE 10 MG: 5 TABLET ORAL at 11:46

## 2023-01-01 RX ADMIN — INSULIN ASPART 1 UNITS: 100 INJECTION, SOLUTION INTRAVENOUS; SUBCUTANEOUS at 23:50

## 2023-01-01 RX ADMIN — Medication 6 MG: at 00:34

## 2023-01-01 RX ADMIN — IPRATROPIUM BROMIDE AND ALBUTEROL SULFATE 3 ML: 2.5; .5 SOLUTION RESPIRATORY (INHALATION) at 15:29

## 2023-01-01 ASSESSMENT — ACTIVITIES OF DAILY LIVING (ADL)
ADLS_ACUITY_SCORE: 56
ADLS_ACUITY_SCORE: 56
ADLS_ACUITY_SCORE: 60
ADLS_ACUITY_SCORE: 60
ADLS_ACUITY_SCORE: 56
ADLS_ACUITY_SCORE: 60
ADLS_ACUITY_SCORE: 56

## 2023-01-01 NOTE — PLAN OF CARE
Problem: Pain Acute  Goal: Optimal Pain Control and Function  Outcome: Progressing  Intervention: Prevent or Manage Pain  Recent Flowsheet Documentation  Taken 1/1/2023 0155 by Chelsi Ann RN  Medication Review/Management: medications reviewed     Problem: Restraint, Nonviolent  Goal: Absence of Harm or Injury  Outcome: Progressing  Intervention: Protect Dignity, Rights and Personal Wellbeing  Recent Flowsheet Documentation  Taken 1/1/2023 0155 by Chelsi Ann RN  Trust Relationship/Rapport:    care explained    choices provided   Goal Outcome Evaluation:    Patient appeared to be restless and agitated about restraint mitts at beginning of shift, vss, PRN adm for restlessness and agitation with effectiveness, use of restrains explained continuously during noc shift, bypassing stool x 2, rectal tube readjusted with no further bypassing at this time, will continue to monitor and assess for use of restraints.

## 2023-01-01 NOTE — SIGNIFICANT EVENT
Significant Event Note    Time of event: 10:56 PM December 31, 2022    Description of event:  Agitation, pulling at trach and vent    Plan:  -Mitts bilaterally   -Seroquel 25mg x 1  -12.5mg prn for agitation     Discussed with: bedside nurse    Stephanie Schmidt MD

## 2023-01-01 NOTE — PROGRESS NOTES
Pt VSS. Alert and oriented x3.   Complains of pain in anus/perineum.  Denies nausea, dizziness, shortness of breath and lightheadedness. On trach dome.  Bedrest and chair    Adequate intake and output.  On tube feeding Last BM: today in rectal tube, anus still red/pink open .  Incontinent of bowel and bladder. .  PRNS: none. Uses call light appropriately.  Mood calm, handling hospitalization well.   Continue to monitor.      Nanda Bob RN, BSN, CMSRN, JAQUAN-BC

## 2023-01-01 NOTE — PROGRESS NOTES
Pt VSS. Alert and oriented x3 but confused as night wore on.   Complains of pain in anus/perineum.  Denies nausea, dizziness, shortness of breath and lightheadedness. On trach dome but vent for NOC  Bedrest   High residual 220 mls.  Adequate intake and output.  On tube feeding Last BM: today .  Incontinent of bowel and bladder. .  PRNS: none. Uses call light appropriately.  Mood calm, handling hospitalization well.   Continue to monitor.     Nanda Bob RN, BSN, CMSRN, JAQUAN-BC

## 2023-01-01 NOTE — PLAN OF CARE
Problem: Mechanical Ventilation Invasive  Goal: Optimal Device Function  Outcome: Progressing  Goal: Mechanical Ventilation Liberation  Outcome: Progressing  Goal: Absence of Device-Related Skin and Tissue Injury  Outcome: Progressing  Goal: Absence of Ventilator-Induced Lung Injury  Outcome: Progressing   RT PROGRESS NOTE     DATA:     CURRENT SETTINGS: AC 12, 450, +5, 26% @Night             TRACH TYPE / SIZE: # 6 SHILEY  XLT  PROXIMAL Placed on 11/29/2022             MODE: AC             FIO2:  26%     ACTION:             THERAPIES: Duoneb/Mucomyst QID              SUCTION:                           FREQUENCY: X4                        AMOUNT: Small                         CONSISTENCY: thick                        COLOR: Pale yellow              SPONTANEOUS COUGH EFFORT/STRENGTH OF EFFORT (not elicited by suctioning):Strong                              WEANING PHASE: 1 and 2                         WEAN MODE: 40%@40 L/MIN TM/cuff up and CPAP 5/PS 8                        WEAN TIME:3 hrs on TM X2,  3 hrs   45 MIN on PS 8/5 and Since 18:35                          END WEAN REASON: Ongoing     RESPONSE:             BS:  Coarse             VITAL SIGNS:Sat 100%,HR ,RR 26-29             EMOTIONAL NEEDS / CONCERNS:  no              RISK FOR SELF DECANNULATION:                          RISK DUE TO:                          INTERVENTION/S IN PLACE IS/ARE:

## 2023-01-01 NOTE — PROGRESS NOTES
[facility]    Medicine Progress Note - Hospitalist Service    Date of Admission:  12/29/2022    History of Present Illness  Blanco Osborne is an 57 year old male who is transferred from [facility] for rehab and continuation of IV antibiotic treatment.  Patient has multiple medical problems including history of liver transplant 2016, PAF on chronic anticoagulation, history of DM2, CVA, HTN, CHRISTIAN on BiPAP, and history of alcohol abuse in the past causing liver damage ending with liver transplant.  About 6 weeks ago he had respiratory arrest and was diagnosed with infection with parainfluenza and strep pneumoniae and acute on chronic renal insufficiency ending with CRF needing 3/week hemodialysis.  During this period he was diagnosed with cirrhosis of transplanted liver and hyperammonia.  Currently he is on Lactulose and Rifaximin.        On 12/14/22, due to CIP, he was transferred to [facility] for the first time with Trach, PEG and Rt chest tube.    On 12/16/2022 patient was transferred back to [facility] due to respiratory insufficiency secondary to hydropneumothorax and drainage of 900 cc bloody pleural effusion, bloody stool and transfusion of 2 units PRBC.      On 12/20/2022 he had another episode of PEA arrest followed by CPR x2 minutes and possibly had seizure activity in [facility]. 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  He has been on ventilator and has improved to trach dome tolerating up to 6 hours so far. He had chest tube which was removed.    He had SBP with growth of lactobacillus and is currently on Unasyn which will be switched to Augmentin orally through 1/12/2023.    Pleural effusion grew Candida and is on micafungin.  He is also on  due to cirrhosis and hyperammonia.  He is anemic and is on Retacrit.   He is on lacosamide and Keppra due to history of seizure.  There is  question of him drinking again causing him cirrhosis of the transplanted liver.      Assessment & Plan   Principal Problem:    Acute on chronic respiratory failure with hypoxia (H)  Active Problems:    Acquired immunocompromised state (H)    Cirrhosis of liver (H)    NAFLD (nonalcoholic fatty liver disease)    Liver transplanted (H)    Immunosuppression (H)    Acute kidney injury (H)    Spontaneous bacterial peritonitis (H)    Critical illness myopathy    History of sudden cardiac arrest, PEA    Dependent on hemodialysis (H)    Pulmonary:  Acute now chronic respiratory failure requiring tracheostomy..  Initial event was parainfluenza and strep pneumo pneumonia.  Had failed extubation x2 and tracheostomy performed last hospitalization.  Had additional complications of bilateral pneumothoraces.  (Most recently was a hydropneumothorax where fluid grew Candida..  Chest tube was placed and removed during this hospitalization.  Had decent tolerance for pressure support and trach dome but after few hours and trach dome 1227 had a pretty significant episode of desaturation.   Plan:  1.  We will alternate trach dome and pressure support and try to wean.  Pulmonary is consulted.   2.  At least 4 weeks of antimicrobial for Candida detailed in ID section    Cardiovascular system:  PEA arrest prior to his previous admission and 1 episode of PEA associated with desaturation on 1220.  No structural cardiac disease but does have A. Fib which neurologist thought might have been contributory to his embolic strokes.  Anticoagulation been restarted with stable labs although high risk for bleeding seconday to thrombocytopenia.  Also has developed baseline hypotension and is on midodrine 10 mg 3 times daily.  Plan:  Continue Eliquis.  Continue current midodrine dose     CNS:  1. Seizure after hypoxic event.  This is in the context of baseline multifactorial encephalopathy secondary to his ongoing critical illness and prior cardiac  arrests and prior strokes and cirrhosis with hepatic encephalopathy. MRI of head of 12/20/22 reveals no PRES or leptomeningeal enhancement but scattered.  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   Plan:  1.  Keppra and vimpat indefinitely.  Neurology follow-up.  2.  Anticoagulation indefinitely for secondary stroke prevention.  3. Tylenol TID for headache.        Renal/Electrolytes:  1. LORETTA, ?CRI: Now on iHD.  No return of renal function.  - MW schedule      ID:  1. 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 4.  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  Have had some conversations within our group and with transplant ID and general infectious disease.  General consensus is that ganciclovir probably could be discontinued  2. H/o Strep/Parainfluenza infection last hospitalization. Completed treatment course  3. 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  4.  Lactobacillus growing in the broth 4 days after paracentesis.  Cell count was very low.  5.  Candida in left pleural fluid cultures.  Sensitivities not resulted.  6.  Immunosuppressed on tacrolimus.  Discussed with transplant service at the [facility] who felt that given the absence of rejection on his most recent biopsy and ongoing issues with infection continuing with 1 twice daily tacrolimus is the best current option.  They have seen his subtherapeutic trough levels.  Plan:  1.  Confirm with transplant ID whether or not we can stop ganciclovir.  2.  Unasyn for 2 weeks total.  Change to Augmentin through 1/12/2023.  3.  At least 4 weeks of antimicrobial for the Candida.  If sensitive to fluconazole could de-escalate from micafungin.  4.  Continue tacrolimus as above and we are tapering steroids     GI/:  Post transplant cirrhosis.  Main manifestations of this are hepatic encephalopathy and ascites.  Hepatologist at [facility] felt that most likely reason for the cirrhosis was metabolic syndrome or alcohol intake.  There was  no evidence of rejection on biopsy and there was some evidence of regeneration. Paracentesis done on 12/22/2022 with lack of bacillus in the broth indicating SBP low cell count was very low  Plan:  1.  Intermittent paracentesis as needed.  2.  Getting 1 mg  tacrolimus BID and tapering steroids   3. Lactulose and Rifaximin as above  4.  Treat SBP for 2 weeks.  5.  He has an appointment in April with Dr. Simms if he is out of the hospital.     Endocrine:  1. DM.  Titrating insulin. He needs supplemental coverage.  2. He is on steroids which we are weaning  Plan:  Continue with current regimen.      Nutrition:  At goal tube feeds via PEG tube     Heme/Onc:  1. Pancytopenia; possibly due cirrhosis. WBC count has improved . Chronic splenomegaly was present prior to liver transplant but has not regressed.   2. Needs anticoagulation for AFib.   Plan:  1.  Continue Eliquis      ICU prophylaxis:      DVT PPX: Eliquis      Restraints needed: No     Stress ulcer: IV PPI     Central line: Needed for IV access/Meds.     Code Status: Full     Diet: Adult Formula Drip Feeding: Continuous Novasource Renal; Gastrostomy; Goal Rate: 60; mL/hr    DVT Prophylaxis: DOAC  Nixon Catheter: Not present  Central Lines: PRESENT      Cardiac Monitoring: None  Code Status: Full Code      Disposition Plan     Expected Discharge Date: 01/01/2023    Discharge Delays: Complex Discharge    Discharge Comments: Vent. Trach. Phase 1. PEG.  Rectal Tube.        The patient's care was discussed with the Bedside Nurse, Patient and Patient's Family.    Edgar Fritz MD  Hospitalist Service  [facility]  Securely message with the Vocera Web Console (learn more here)  Text page via [employer] Paging/Directory         Clinically Significant Risk Factors              # Hypoalbuminemia: Lowest albumin = 1.8 g/dL at 12/29/2022  4:40 AM, will monitor as appropriate             # Severe Malnutrition: based on nutrition assessment, PRESENT ON ADMISSION        ______________________________________________________________________    Interval History   Patient is sitting in his bed and is in no distress.  He has tracheostomy and is on vent.  He is comfortable.  He did not verbalize any concerns or discomfort.  Patient denies any chest pain or shortness of breath or abdominal pain or nausea or vomiting or current headache.  Patient is on track to home with tube feeding and rectal tube in place.  He is on hemodialysis 3 days a week.  Vital signs are blood glucose are stable.    Data reviewed today: I reviewed all medications, new labs and imaging results over the last 24 hours. I personally reviewed no images or EKG's today.    Physical Exam   Vital Signs: Temp: 98.4  F (36.9  C) Temp src: Oral BP: 115/77 Pulse: 107   Resp: 22 SpO2: 99 % O2 Device: Mechanical Ventilator Oxygen Delivery: 40 LPM  Weight: 209 lbs 6.4 oz  Patient is sitting in bed.  Is in no acute distress.  Looks pale and chronically ill.  He communicates and is pleasant and cooperative.  He is on ventilator through tracheostomy  HEENT: Nl oral mucosal moisture.  Pale mucosa, no icterus.  Neck: Tracheostomy in place.  Right external jugular CVC.  Heart: Regular rate and rhythm, no murmur  Abdomen: Moderately enlarged due to ascites.  Nontender.  No mass or organomegaly noted.  Patient has rectal tube.  PEG tube in place.  : Deferred  Extremities: No edema.  PICC line in right brachial artery.  Neurological exam: Patient is alert and oriented.  Has normal behavior.  Gross cranial nerve neurological exam did not show any major abnormality.  Left  is weaker than the right otherwise no focal neurological deficit noted in limited neurological exam. Gait and station were not examined.    Data   Recent Labs   Lab 01/01/23  0353 01/01/23  0024 12/31/22  2133 12/31/22  0826 12/31/22  0610 12/30/22  1628 12/30/22  1454 12/30/22  0817 12/30/22  0651 12/29/22  0549 12/29/22  0440 12/27/22  0429 12/27/22  0420  12/26/22  0443 12/26/22  0436   WBC  --   --   --   --   --   --   --   --   --   --   --   --  7.5  --  4.8   HGB  --   --   --   --   --   --   --   --   --   --   --   --  8.8*  --  8.1*   MCV  --   --   --   --   --   --   --   --   --   --   --   --  107*  --  103*   PLT  --   --   --   --   --   --   --   --   --   --   --   --  63*  --  55*   NA  --   --   --   --  137  --   --   --  142  --  137   < > 139  --   --    POTASSIUM  --   --   --   --  3.7  --  4.1  --  3.4  --  3.4   < > 3.1*  --   --    CHLORIDE  --   --   --   --  99  --   --   --  102  --  103   < > 102  --   --    CO2  --   --   --   --  29  --   --   --  30*  --  31   < > 29  --   --    BUN  --   --   --   --  44.1*  --   --   --  61.0*  --  46*   < > 52*  --   --    CR  --   --   --   --  2.23*  --   --   --  3.02*  --  2.40*   < > 2.61*  --   --    ANIONGAP  --   --   --   --  9  --   --   --  10  --  3   < > 8  --   --    KELLY  --   --   --   --  8.0*  --   --   --  8.1*  --  7.8*   < > 8.1*  --   --    * 151* 125*   < > 150*   < >  --    < > 134*   < > 160*   < > 127*   < >  --    ALBUMIN  --   --   --   --  2.2*  --   --   --  2.2*  --  1.8*   < > 2.1*  --   --     < > = values in this interval not displayed.     Medications     dextrose       - MEDICATION INSTRUCTIONS -         acetaminophen  975 mg Per Feeding Tube Q8H [facility]    Or     acetaminophen  650 mg Rectal Q8H [facility]     acetylcysteine  2 mL Nebulization BID     amoxicillin-clavulanate  500 mg Per Feeding Tube QPM     [START ON 1/2/2023] amoxicillin-clavulanate  500 mg Per Feeding Tube Once per day on Mon Wed Fri     apixaban ANTICOAGULANT  5 mg Oral or Feeding Tube BID     B and C vitamin Complex with folic acid  5 mL Per Feeding Tube Daily     carboxymethylcellulose PF  1 drop Both Eyes TID     chlorhexidine  15 mL Mouth/Throat BID     epoetin mirta-epbx  10,000 Units Subcutaneous Once per day on Mon Wed Fri     folic acid  1 mg Oral or Feeding Tube Daily     insulin aspart   1-6 Units Subcutaneous Q4H [facility]     insulin glargine  20 Units Subcutaneous Daily     ipratropium - albuterol 0.5 mg/2.5 mg/3 mL  3 mL Nebulization 4x daily     lacosamide  100 mg Oral or Feeding Tube BID     lactulose  20 g Oral or Feeding Tube TID     levETIRAcetam  1,000 mg Oral or Feeding Tube Q24H     micafungin  100 mg Intravenous Q24H     midodrine  10 mg Oral or Feeding Tube TID w/meals     mineral oil-hydrophilic petrolatum   Topical Daily     nystatin  500,000 Units Swish & Swallow 4x Daily     pantoprazole  40 mg Per Feeding Tube BID     QUEtiapine  50 mg Oral or Feeding Tube At Bedtime     rifaximin  550 mg Per Feeding Tube BID     sodium chloride (PF)  9 mL Intracatheter During Dialysis/CRRT (from stock)     sodium chloride (PF)  9 mL Intracatheter During Dialysis/CRRT (from stock)     tacrolimus  1 mg Oral or Feeding Tube BID     torsemide  100 mg Per Feeding Tube Daily

## 2023-01-01 NOTE — PROGRESS NOTES
Patient was up in the chair during his SBT in no distress. Course breath sounds and some thick drainage around trach noted. Small amount of thick, tan, secretions suctioned with no decompensation. Patient is alert and orientated following commands. Placed back in full Vent support after aprox.3 hours of SBT for sleep. Patient tolerated wean well. Throughout the night, PT started to pull on vent circuit disconnecting from vent. Mitts were ordered for safety. Will continue to monitor.

## 2023-01-02 ENCOUNTER — APPOINTMENT (OUTPATIENT)
Dept: OCCUPATIONAL THERAPY | Facility: CLINIC | Age: 58
DRG: 207 | End: 2023-01-02
Attending: HOSPITALIST
Payer: COMMERCIAL

## 2023-01-02 ENCOUNTER — APPOINTMENT (OUTPATIENT)
Dept: PHYSICAL THERAPY | Facility: CLINIC | Age: 58
DRG: 207 | End: 2023-01-02
Attending: HOSPITALIST
Payer: COMMERCIAL

## 2023-01-02 ENCOUNTER — APPOINTMENT (OUTPATIENT)
Dept: SPEECH THERAPY | Facility: CLINIC | Age: 58
DRG: 207 | End: 2023-01-02
Attending: HOSPITALIST
Payer: COMMERCIAL

## 2023-01-02 LAB
ALBUMIN SERPL BCG-MCNC: 2.2 G/DL (ref 3.5–5.2)
ANION GAP SERPL CALCULATED.3IONS-SCNC: 10 MMOL/L (ref 7–15)
BUN SERPL-MCNC: 74.2 MG/DL (ref 6–20)
CALCIUM SERPL-MCNC: 8.5 MG/DL (ref 8.6–10)
CHLORIDE SERPL-SCNC: 95 MMOL/L (ref 98–107)
CREAT SERPL-MCNC: 3.06 MG/DL (ref 0.67–1.17)
DEPRECATED HCO3 PLAS-SCNC: 29 MMOL/L (ref 22–29)
GFR SERPL CREATININE-BSD FRML MDRD: 23 ML/MIN/1.73M2
GLUCOSE BLDC GLUCOMTR-MCNC: 121 MG/DL (ref 70–99)
GLUCOSE BLDC GLUCOMTR-MCNC: 127 MG/DL (ref 70–99)
GLUCOSE BLDC GLUCOMTR-MCNC: 130 MG/DL (ref 70–99)
GLUCOSE BLDC GLUCOMTR-MCNC: 136 MG/DL (ref 70–99)
GLUCOSE BLDC GLUCOMTR-MCNC: 142 MG/DL (ref 70–99)
GLUCOSE SERPL-MCNC: 154 MG/DL (ref 70–99)
MAGNESIUM SERPL-MCNC: 1.8 MG/DL (ref 1.7–2.3)
PHOSPHATE SERPL-MCNC: 3.2 MG/DL (ref 2.5–4.5)
POTASSIUM SERPL-SCNC: 3.7 MMOL/L (ref 3.4–5.3)
SODIUM SERPL-SCNC: 134 MMOL/L (ref 136–145)
TACROLIMUS BLD-MCNC: 3.2 UG/L (ref 5–15)
TME LAST DOSE: ABNORMAL H
TME LAST DOSE: ABNORMAL H

## 2023-01-02 PROCEDURE — 94003 VENT MGMT INPAT SUBQ DAY: CPT | Performed by: NURSE PRACTITIONER

## 2023-01-02 PROCEDURE — 999N000009 HC STATISTIC AIRWAY CARE

## 2023-01-02 PROCEDURE — 250N000013 HC RX MED GY IP 250 OP 250 PS 637: Performed by: HOSPITALIST

## 2023-01-02 PROCEDURE — 250N000009 HC RX 250: Performed by: NURSE PRACTITIONER

## 2023-01-02 PROCEDURE — 97530 THERAPEUTIC ACTIVITIES: CPT | Mod: GP | Performed by: PHYSICAL THERAPIST

## 2023-01-02 PROCEDURE — 250N000011 HC RX IP 250 OP 636: Performed by: HOSPITALIST

## 2023-01-02 PROCEDURE — 97110 THERAPEUTIC EXERCISES: CPT | Mod: GP | Performed by: PHYSICAL THERAPIST

## 2023-01-02 PROCEDURE — 634N000001 HC RX 634: Performed by: PHYSICIAN ASSISTANT

## 2023-01-02 PROCEDURE — 80197 ASSAY OF TACROLIMUS: CPT | Performed by: HOSPITALIST

## 2023-01-02 PROCEDURE — 120N000017 HC R&B RESPIRATORY CARE

## 2023-01-02 PROCEDURE — 258N000003 HC RX IP 258 OP 636: Performed by: HOSPITALIST

## 2023-01-02 PROCEDURE — 258N000003 HC RX IP 258 OP 636: Performed by: NURSE PRACTITIONER

## 2023-01-02 PROCEDURE — 97110 THERAPEUTIC EXERCISES: CPT | Mod: GO | Performed by: OCCUPATIONAL THERAPIST

## 2023-01-02 PROCEDURE — 99232 SBSQ HOSP IP/OBS MODERATE 35: CPT | Performed by: HOSPITALIST

## 2023-01-02 PROCEDURE — 250N000011 HC RX IP 250 OP 636: Performed by: NURSE PRACTITIONER

## 2023-01-02 PROCEDURE — 999N000157 HC STATISTIC RCP TIME EA 10 MIN

## 2023-01-02 PROCEDURE — 90935 HEMODIALYSIS ONE EVALUATION: CPT

## 2023-01-02 PROCEDURE — 250N000012 HC RX MED GY IP 250 OP 636 PS 637: Performed by: HOSPITALIST

## 2023-01-02 PROCEDURE — 92507 TX SP LANG VOICE COMM INDIV: CPT | Mod: GN | Performed by: SPEECH-LANGUAGE PATHOLOGIST

## 2023-01-02 PROCEDURE — 250N000013 HC RX MED GY IP 250 OP 250 PS 637: Performed by: NURSE PRACTITIONER

## 2023-01-02 PROCEDURE — 250N000009 HC RX 250: Performed by: HOSPITALIST

## 2023-01-02 PROCEDURE — 94640 AIRWAY INHALATION TREATMENT: CPT | Mod: 76

## 2023-01-02 PROCEDURE — 83735 ASSAY OF MAGNESIUM: CPT | Performed by: HOSPITALIST

## 2023-01-02 PROCEDURE — 82947 ASSAY GLUCOSE BLOOD QUANT: CPT | Performed by: HOSPITALIST

## 2023-01-02 PROCEDURE — 94003 VENT MGMT INPAT SUBQ DAY: CPT

## 2023-01-02 PROCEDURE — 99232 SBSQ HOSP IP/OBS MODERATE 35: CPT | Performed by: NURSE PRACTITIONER

## 2023-01-02 PROCEDURE — 94640 AIRWAY INHALATION TREATMENT: CPT

## 2023-01-02 PROCEDURE — 999N000253 HC STATISTIC WEANING TRIALS

## 2023-01-02 PROCEDURE — 250N000013 HC RX MED GY IP 250 OP 250 PS 637: Performed by: PHYSICIAN ASSISTANT

## 2023-01-02 RX ORDER — HEPARIN SODIUM 1000 [USP'U]/ML
1900 INJECTION, SOLUTION INTRAVENOUS; SUBCUTANEOUS
Status: DISCONTINUED | OUTPATIENT
Start: 2023-01-02 | End: 2023-01-21 | Stop reason: HOSPADM

## 2023-01-02 RX ORDER — LACTULOSE 10 G/15ML
20 SOLUTION ORAL 2 TIMES DAILY
Status: DISCONTINUED | OUTPATIENT
Start: 2023-01-02 | End: 2023-01-21 | Stop reason: HOSPADM

## 2023-01-02 RX ADMIN — APIXABAN 5 MG: 2.5 TABLET, FILM COATED ORAL at 08:58

## 2023-01-02 RX ADMIN — HEPARIN SODIUM 1900 UNITS: 1000 INJECTION INTRAVENOUS; SUBCUTANEOUS at 20:07

## 2023-01-02 RX ADMIN — Medication 12.5 MG: at 04:14

## 2023-01-02 RX ADMIN — WHITE PETROLATUM: 1.75 OINTMENT TOPICAL at 09:00

## 2023-01-02 RX ADMIN — CHLORHEXIDINE GLUCONATE 0.12% ORAL RINSE 15 ML: 1.2 LIQUID ORAL at 21:51

## 2023-01-02 RX ADMIN — NYSTATIN 500000 UNITS: 100000 SUSPENSION ORAL at 16:15

## 2023-01-02 RX ADMIN — ACETYLCYSTEINE 2 ML: 200 SOLUTION ORAL; RESPIRATORY (INHALATION) at 08:23

## 2023-01-02 RX ADMIN — EPOETIN ALFA-EPBX 10000 UNITS: 10000 INJECTION, SOLUTION INTRAVENOUS; SUBCUTANEOUS at 21:43

## 2023-01-02 RX ADMIN — IPRATROPIUM BROMIDE AND ALBUTEROL SULFATE 3 ML: 2.5; .5 SOLUTION RESPIRATORY (INHALATION) at 20:23

## 2023-01-02 RX ADMIN — Medication 5 ML: at 08:58

## 2023-01-02 RX ADMIN — Medication 40 MG: at 21:58

## 2023-01-02 RX ADMIN — MICAFUNGIN SODIUM 100 MG: 100 INJECTION, POWDER, LYOPHILIZED, FOR SOLUTION INTRAVENOUS at 19:28

## 2023-01-02 RX ADMIN — NYSTATIN 500000 UNITS: 100000 SUSPENSION ORAL at 12:15

## 2023-01-02 RX ADMIN — ACETAMINOPHEN 975 MG: 325 TABLET, FILM COATED ORAL at 06:17

## 2023-01-02 RX ADMIN — AMOXICILLIN AND CLAVULANATE POTASSIUM 500 MG: 400; 57 POWDER, FOR SUSPENSION ORAL at 21:50

## 2023-01-02 RX ADMIN — IPRATROPIUM BROMIDE AND ALBUTEROL SULFATE 3 ML: 2.5; .5 SOLUTION RESPIRATORY (INHALATION) at 16:43

## 2023-01-02 RX ADMIN — Medication 10 ML: at 08:56

## 2023-01-02 RX ADMIN — QUETIAPINE FUMARATE 50 MG: 50 TABLET ORAL at 21:54

## 2023-01-02 RX ADMIN — ACETAMINOPHEN 975 MG: 325 TABLET, FILM COATED ORAL at 13:52

## 2023-01-02 RX ADMIN — Medication 1 DROP: at 08:58

## 2023-01-02 RX ADMIN — Medication 40 MG: at 10:19

## 2023-01-02 RX ADMIN — TORSEMIDE 100 MG: 100 TABLET ORAL at 08:58

## 2023-01-02 RX ADMIN — TACROLIMUS 1 MG: 5 CAPSULE ORAL at 17:31

## 2023-01-02 RX ADMIN — LACTULOSE 20 G: 20 SOLUTION ORAL at 08:58

## 2023-01-02 RX ADMIN — CHLORHEXIDINE GLUCONATE 0.12% ORAL RINSE 15 ML: 1.2 LIQUID ORAL at 08:58

## 2023-01-02 RX ADMIN — APIXABAN 5 MG: 2.5 TABLET, FILM COATED ORAL at 21:50

## 2023-01-02 RX ADMIN — RIFAXIMIN 550 MG: 550 TABLET ORAL at 21:58

## 2023-01-02 RX ADMIN — TACROLIMUS 1 MG: 5 CAPSULE ORAL at 08:59

## 2023-01-02 RX ADMIN — INSULIN GLARGINE 20 UNITS: 100 INJECTION, SOLUTION SUBCUTANEOUS at 09:51

## 2023-01-02 RX ADMIN — MIDODRINE HYDROCHLORIDE 10 MG: 5 TABLET ORAL at 12:15

## 2023-01-02 RX ADMIN — RIFAXIMIN 550 MG: 550 TABLET ORAL at 10:19

## 2023-01-02 RX ADMIN — Medication 6 MG: at 21:53

## 2023-01-02 RX ADMIN — LEVETIRACETAM 1000 MG: 100 SOLUTION ORAL at 21:53

## 2023-01-02 RX ADMIN — FOLIC ACID 1 MG: 1 TABLET ORAL at 08:58

## 2023-01-02 RX ADMIN — LACTULOSE 20 G: 20 SOLUTION ORAL at 13:52

## 2023-01-02 RX ADMIN — NYSTATIN 500000 UNITS: 100000 SUSPENSION ORAL at 08:58

## 2023-01-02 RX ADMIN — IPRATROPIUM BROMIDE AND ALBUTEROL SULFATE 3 ML: 2.5; .5 SOLUTION RESPIRATORY (INHALATION) at 08:23

## 2023-01-02 RX ADMIN — Medication 1 DROP: at 21:51

## 2023-01-02 RX ADMIN — MICONAZOLE NITRATE ANTIFUNGAL POWDER: 2 POWDER TOPICAL at 09:00

## 2023-01-02 RX ADMIN — Medication 1 DROP: at 13:52

## 2023-01-02 RX ADMIN — INSULIN ASPART 1 UNITS: 100 INJECTION, SOLUTION INTRAVENOUS; SUBCUTANEOUS at 11:54

## 2023-01-02 RX ADMIN — LACOSAMIDE 100 MG: 10 SOLUTION ORAL at 21:52

## 2023-01-02 RX ADMIN — NYSTATIN 500000 UNITS: 100000 SUSPENSION ORAL at 21:53

## 2023-01-02 RX ADMIN — ACETAMINOPHEN 975 MG: 325 TABLET, FILM COATED ORAL at 21:48

## 2023-01-02 RX ADMIN — AMOXICILLIN AND CLAVULANATE POTASSIUM 500 MG: 400; 57 POWDER, FOR SUSPENSION ORAL at 10:07

## 2023-01-02 RX ADMIN — LACOSAMIDE 100 MG: 10 SOLUTION ORAL at 08:58

## 2023-01-02 RX ADMIN — IPRATROPIUM BROMIDE AND ALBUTEROL SULFATE 3 ML: 2.5; .5 SOLUTION RESPIRATORY (INHALATION) at 12:17

## 2023-01-02 RX ADMIN — SODIUM CHLORIDE 300 ML: 9 INJECTION, SOLUTION INTRAVENOUS at 16:13

## 2023-01-02 RX ADMIN — ACETYLCYSTEINE 2 ML: 200 SOLUTION ORAL; RESPIRATORY (INHALATION) at 20:23

## 2023-01-02 ASSESSMENT — ACTIVITIES OF DAILY LIVING (ADL)
ADLS_ACUITY_SCORE: 62
ADLS_ACUITY_SCORE: 56
ADLS_ACUITY_SCORE: 56
ADLS_ACUITY_SCORE: 62
ADLS_ACUITY_SCORE: 56
ADLS_ACUITY_SCORE: 56
ADLS_ACUITY_SCORE: 60
ADLS_ACUITY_SCORE: 62
ADLS_ACUITY_SCORE: 62

## 2023-01-02 NOTE — PLAN OF CARE
Problem: Plan of Care - These are the overarching goals to be used throughout the patient stay.    Goal: Optimal Comfort and Wellbeing  Outcome: Progressing  Intervention: Monitor Pain and Promote Comfort  Recent Flowsheet Documentation  Taken 1/1/2023 2349 by Shirley Larkin, RN  Pain Management Interventions:    medication (see MAR)    distraction    emotional support    repositioned  Taken 1/1/2023 2120 by Shirley Larkin, RN  Pain Management Interventions:    medication (see MAR)    distraction    emotional support    repositioned     Pt resting quietly in bed when writer arrived to shift. Alert, able to make needs known by mouthing words. Difficult to fully assess orientation due to trach, but pt seems oriented. Did pull at ventilator x1, reoriented to not pull at tubing successfully. Pt later anxious and given PRN Seroquel by other nurse at 0414. C/o pain at start of shift to left side, given scheduled Tylenol. Pt later c/o abdominal pain 7/10, PRN Tylenol given at 2349, pt denies pain upon reassessment. On ventilator, suctioned by this writer x1 for scant amount of secretions. Tube feeding running and flushed per orders. Abdomen distended and firm, per notes has ascites. Old tube feeding dressing removed, site bleeding, was cleansed and new dressing of [employer] and split Mepilex added. Noted bleeding wound underneath left abdominal fold that was covered with Mepilex, could not find an order for this Mepilex. WOC updated via v/m, site cleansed and new Mepilex applied. Interdry applied to groin folds. Rectal tube patent, did bypass small amt x1, marked at 850 mL line. HS lactulose held due to high volume in rectal tube bag. Viscopaste applied to anal opening. Pt anuric, on dialysis. Pt has order for bladder scans, per progress note pt was straight cathed the other day for scant amount of concentrated urine. Left note to provider requesting clarification of bladder scan order due to this. Pt not  bladder scanned this shift. Left heel blanchable red and boggy, pt has Prevalon boots in room but only wore for two hours. Heels floated rest of the time. Blood sugars 140, 152 and 130.     Pt on phosphorus and potassium protocols. Both labs will be rechecked today.

## 2023-01-02 NOTE — PLAN OF CARE
Problem: Restraint, Nonviolent  Goal: Absence of Harm or Injury  Outcome: Met  Intervention: Protect Dignity, Rights and Personal Wellbeing  Recent Flowsheet Documentation  Taken 1/2/2023 1029 by Shilpa Myles RN  Trust Relationship/Rapport:   care explained   choices provided   emotional support provided   empathic listening provided   questions answered   questions encouraged   reassurance provided   thoughts/feelings acknowledged  Intervention: Protect Skin and Joint Integrity  Recent Flowsheet Documentation  Taken 1/2/2023 1206 by Shilpa Myles RN  Body Position:   right   turned     Problem: Chronic Kidney Disease  Goal: Optimal Coping with Chronic Illness  Outcome: Progressing  Intervention: Support Psychosocial Response  Recent Flowsheet Documentation  Taken 1/2/2023 1029 by Shilpa Myles RN  Family/Support System Care: presence promoted   Goal Outcome Evaluation:       Alert and oriented, chronic abdominal pain, ascites. Vent support, phase 2 weaning, suctioned x2 , red/pinkish secretions. Skin is fragile , muliple bruises, mepilex changed. NO restraints. Anuric-no need to continue Bladder scanning. Hemodialysis this afternoon-3rd run. Up  n the chair x 2, good endurance sitting up. Family at bedside -updated.  Shilpa Myles RN

## 2023-01-02 NOTE — PLAN OF CARE
Problem: Mechanical Ventilation Invasive  Goal: Optimal Device Function  Outcome: Progressing  Goal: Mechanical Ventilation Liberation  Outcome: Progressing  Goal: Absence of Device-Related Skin and Tissue Injury  Outcome: Progressing  Goal: Absence of Ventilator-Induced Lung Injury  Outcome: Progressing   RT PROGRESS NOTE     DATA:     CURRENT SETTINGS: AC 12, 450, +5, 24% @Night             TRACH TYPE / SIZE: # 6 SHILEY  XLT  PROXIMAL Placed on 11/29/2022             MODE: AC             FIO2:  24%     ACTION:             THERAPIES: Duoneb QID , Mucomyst neb BID             SUCTION:                           FREQUENCY: X5                        AMOUNT: Small to large                        CONSISTENCY: thick                        COLOR: white/pale yellow / Pink tinged              SPONTANEOUS COUGH EFFORT/STRENGTH OF EFFORT (not elicited by suctioning):Strong                              WEANING PHASE: 1 and 2                         WEAN MODE:30%@30 L/MIN TM/cuff days as ashley. and Vent @ Night                        WEAN TIME: 08:20                        END WEAN REASON: Ongoing     RESPONSE:             BS:  Coarse             VITAL SIGNS:Sat %,HR 92-98 ,RR 20-22             EMOTIONAL NEEDS / CONCERNS:  no              RISK FOR SELF DECANNULATION:                          RISK DUE TO:                          INTERVENTION/S IN PLACE IS/ARE:     NOTE / PLAN: Cont . Current plan of care/ Patient is weaning on 30%@30 TM-: TM/cuff up during day as tolerated with AC night.  PMV with SLP

## 2023-01-02 NOTE — PROGRESS NOTES
Pulmonary Progress Note    Admit Date: 12/29/2022  CODE: Full Code    Assessment/Plan:   57yoM with liver transplant(2016), recent prolonged hospitalization 11/12-12/15 for PEA cardiac arrest, Strep/Parainfluenza pneumonia with ARDS, MODS, right PTX requiring chest tube since removed.  Now HD dependent, s/p trach/PEG.  Discharged to LTAC, sent out the next day with new left PTX & GIB.  Recent hospitalization c/b GIB(resolved), PEA arrest and seizure on 12/20.  Left CT removed on 12/16.  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  Transferred back to LTAC 12/29.     Discussion of pertinent problems:  Acute hypoxic/hypercapnic respiratory failure s/p trach: multifactorial related to pneumonia(treated), ?aspergillus pna s/p 1 week of voriconazole, ARDS(resolved), b/l pneumothoraces(chest tubes removed), b/l pleural effusions & atelectasis(stable), pleural fluid cx growing Candida.  Alternating between PST and trach dome during the day and doing well with this over the weekend     Tracheostomy in place: 8 Shiley placed 11/29.  Changed to 6 Shiley prox xlt 12/8 unclear why     Dysphagia s/p PEG tube.  BDT 12/30 with no immediate but moderate delayed aspiration 45min later    Candida in left pleural fluid cultures: 4 weeks of antimicrobial currently on Micafungin.  Sensitive to fluconazole     LORETTA on MWF HD: followed by Nephrology     S/p Liver transplant with post transplant cirrhosis & ascites s/p paracentesis 12/22: on Tacro and prednisone    CHRISTIAN on home BiPAP     lactobacillus peritonitis on Unasyn transitioned to PO Augmentin.  Duration per ID    Recent seizure after hypoxic event on 12/20 with subsequent seizure 12/21 during dialysis: Keppra and vimpat indefinitely     Multifocal acute ischemic strokes on MRI(12/20).  AC was on hold d/t GIB and left hemothorax, now resumed    Hx b/l  pneumothoraces: right CT first placed 11/16, removed, then replaced by IR on 12/12, removed again 12/19.  Left chest tube placed 12/16, removed on 12/19    Hx mucus plugging of bronchi.  Bronch on 12/20 with minimal secretions.  Currently off metanebs, no secretion issues currently      Plan:   - Phase 2 weans: TM/cuff up during the day with AC night.    - PMV trials with SLP  - Will consider trach downsize to 7 Bivona this week   - Bronchial hygiene with Duonebs QID, Mucomyst, BID, Metanebs PRN  - Fluid removal with dialysis - to keep on dry side given b/l pleural effusions on imaging   - Micafungin for candida isolation in left pleural cultures sensitive to fluconazole - page out to ID on guidance with dosing     Henry Fenton CNP  Pulmonary Medicine  [facility]  Pager [phone]    Subjective/Interim Events:   - on trach dome in no distress.  Denies sob, pain, n/v, fever, chills.  Has generalized pain that is not new and baseline dyspnea     Tracheal secretions:  Overnight - x3, sm/mod, thick  Yesterday - x4, sm, thick     Cough strength: not witnessed    Current phase of ventilator weaning pathway:  Phase 2    Ventilator weaning results   12/29: PS 12/5 for 9hr  12/30-present: TM 3hr x2, otherwise PS 8/5 day, AC night     Clinical status discussed today with respiratory therapist, patient    Medications:       dextrose       - MEDICATION INSTRUCTIONS -         acetaminophen  975 mg Per Feeding Tube Q8H BIJU    Or     acetaminophen  650 mg Rectal Q8H BIJU     acetylcysteine  2 mL Nebulization BID     amoxicillin-clavulanate  500 mg Per Feeding Tube QPM     amoxicillin-clavulanate  500 mg Per Feeding Tube Once per day on Mon Wed Fri     apixaban ANTICOAGULANT  5 mg Oral or Feeding Tube BID     B and C vitamin Complex with folic acid  5 mL Per Feeding Tube Daily     carboxymethylcellulose PF  1 drop Both Eyes TID     chlorhexidine  15 mL Mouth/Throat BID     epoetin mirta-epbx  10,000 Units Subcutaneous Once  per day on Mon Wed Fri     folic acid  1 mg Oral or Feeding Tube Daily     insulin aspart  1-6 Units Subcutaneous Q4H BIJU     insulin glargine  20 Units Subcutaneous Daily     ipratropium - albuterol 0.5 mg/2.5 mg/3 mL  3 mL Nebulization 4x daily     lacosamide  100 mg Oral or Feeding Tube BID     lactulose  20 g Oral or Feeding Tube TID     levETIRAcetam  1,000 mg Oral or Feeding Tube Q24H     micafungin  100 mg Intravenous Q24H     midodrine  10 mg Oral or Feeding Tube TID w/meals     mineral oil-hydrophilic petrolatum   Topical Daily     nystatin  500,000 Units Swish & Swallow 4x Daily     pantoprazole  40 mg Per Feeding Tube BID     QUEtiapine  50 mg Oral or Feeding Tube At Bedtime     rifaximin  550 mg Per Feeding Tube BID     sodium chloride (PF)  9 mL Intracatheter During Dialysis/CRRT (from stock)     sodium chloride (PF)  9 mL Intracatheter During Dialysis/CRRT (from stock)     tacrolimus  1 mg Oral or Feeding Tube BID         Exam/Data:   Vitals  BP 99/66 (BP Location: Left arm)   Pulse 98   Temp 98.1  F (36.7  C) (Oral)   Resp 18   Wt 95 kg (209 lb 8 oz)   SpO2 100%   BMI 28.41 kg/m       I/O last 3 completed shifts:  In: 2263 [I.V.:244; NG/GT:2019]  Out: 850 [Stool:850]  Weight change: 0.045 kg (1.6 oz)    Vent Mode: Trach collar  FiO2 (%): 30 %  Resp Rate (Set): 12 breaths/min  Tidal Volume (Set, mL): 450 mL  PEEP (cm H2O): 5 cmH2O  Pressure Support (cm H2O): 8 cmH2O  Resp: 18      EXAM:  Gen: no distress in chair on trach dome   HEENT: NT, trach midline/intact, peristomal ecchymosis, cuff up   CV: RRR  Resp: CTAB; non-labored   Abd: large, soft, mildly tender, BS hypoactive  Skin: no visible rashes or lesions, scattered ecchymosis, fragile   Ext: mod right pedal edema, weak  Neuro: alert, follows commands, mouths words     ROS:  A 10-system review was obtained and is negative with the exception of the symptoms noted above.    Labs:  Complete Blood Count   Recent Labs   Lab 12/27/22  0420   WBC  7.5   HGB 8.8*   PLT 63*     Basic Metabolic Panel  Recent Labs   Lab 01/02/23  1143 01/02/23  0813 01/02/23  0527 01/02/23  0418 01/01/23  0759 01/01/23  0626 12/31/22  0826 12/31/22  0610 12/30/22  1628 12/30/22  1454 12/30/22  0817 12/30/22  0651   NA  --   --  134*  --   --  142  --  137  --   --   --  142   POTASSIUM  --   --  3.7  --   --  3.7  --  3.7  --  4.1  --  3.4   CHLORIDE  --   --  95*  --   --  101  --  99  --   --   --  102   CO2  --   --  29  --   --  30*  --  29  --   --   --  30*   BUN  --   --  74.2*  --   --  61.1*  --  44.1*  --   --   --  61.0*   CR  --   --  3.06*  --   --  2.72*  --  2.23*  --   --   --  3.02*   * 136* 154* 130*   < > 162*   < > 150*   < >  --    < > 134*    < > = values in this interval not displayed.     Liver Function Tests  Recent Labs   Lab 01/02/23  0527 01/01/23  0626 12/31/22  0610 12/30/22  0651   ALBUMIN 2.2* 2.4* 2.2* 2.2*       RADIOLOGY: Personally reviewed; radiology read below   XR CHEST 12/27/2022     HISTORY: Shortness of breath, mucous plug?     COMPARISON: Chest radiograph 12/24/2022                                                                      IMPRESSION: Stable cardiomediastinal silhouette with unchanged  position of right upper extremity PICC and dialysis catheter.  Tracheostomy tube tip overlies the midthoracic trachea. Persistent  bilateral pleural effusions with associated bibasilar atelectasis. No  new airspace consolidation or lobar atelectasis. No pneumothorax.  Bones are unchanged. Percutaneous gastrostomy tube partially  visualized.

## 2023-01-02 NOTE — PROGRESS NOTES
[facility]    Medicine Progress Note - Hospitalist Service    Date of Admission:  12/29/2022    History of Present Illness  Blanco Osborne is an 57 year old male who is transferred from [facility] for rehab and continuation of IV antibiotic treatment.  Patient has multiple medical problems including history of liver transplant 2016, PAF on chronic anticoagulation, history of DM2, CVA, HTN, CHRISTIAN on BiPAP, and history of alcohol abuse in the past causing liver damage ending with liver transplant.  About 6 weeks ago he had respiratory arrest and was diagnosed with infection with parainfluenza and strep pneumoniae and acute on chronic renal insufficiency ending with CRF needing 3/week hemodialysis.  During this period he was diagnosed with cirrhosis of transplanted liver and hyperammonia.  Currently he is on Lactulose and Rifaximin.        On 12/14/22, due to CIP, he was transferred to [facility] for the first time with Trach, PEG and Rt chest tube.    On 12/16/2022 patient was transferred back to [facility] due to respiratory insufficiency secondary to hydropneumothorax and drainage of 900 cc bloody pleural effusion, bloody stool and transfusion of 2 units PRBC.      On 12/20/2022 he had another episode of PEA arrest followed by CPR x2 minutes and possibly had seizure activity in [facility]. 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  He has been on ventilator and has improved to trach dome tolerating up to 6 hours so far. He had chest tube which was removed.    He had SBP with growth of lactobacillus and is currently on Unasyn which will be switched to Augmentin orally through 1/12/2023.    Pleural effusion grew Candida and is on micafungin.  He is also on  due to cirrhosis and hyperammonia.  He is anemic and is on Retacrit.   He is on lacosamide and Keppra due to history of seizure.  There is  question of him drinking again causing him cirrhosis of the transplanted liver.      Assessment & Plan   Principal Problem:    Acute on chronic respiratory failure with hypoxia (H)  Active Problems:    Acquired immunocompromised state (H)    Cirrhosis of liver (H)    NAFLD (nonalcoholic fatty liver disease)    Liver transplanted (H)    Immunosuppression (H)    Acute kidney injury (H)    Spontaneous bacterial peritonitis (H)    Critical illness myopathy    History of sudden cardiac arrest, PEA    Dependent on hemodialysis (H)    Pulmonary:  Acute now chronic respiratory failure requiring tracheostomy..  Initial event was parainfluenza and strep pneumo pneumonia.  Had failed extubation x2 and tracheostomy performed last hospitalization.  Had additional complications of bilateral pneumothoraces.  (Most recently was a hydropneumothorax where fluid grew Candida..  Chest tube was placed and removed during this hospitalization.  Had decent tolerance for pressure support and trach dome but after few hours and trach dome 1227 had a pretty significant episode of desaturation.   Plan:  1.  We will alternate trach dome and pressure support and try to wean.  Pulmonary is consulted.   2.  At least 4 weeks of antimicrobial for Candida detailed in ID section    Cardiovascular system:  PEA arrest prior to his previous admission and 1 episode of PEA associated with desaturation on 1220.  No structural cardiac disease but does have A. Fib which neurologist thought might have been contributory to his embolic strokes.  Anticoagulation been restarted with stable labs although high risk for bleeding seconday to thrombocytopenia.  Also has developed baseline hypotension and is on midodrine 10 mg 3 times daily.  Plan:  Continue Eliquis.  Continue current midodrine dose     CNS:  1. Seizure after hypoxic event.  This is in the context of baseline multifactorial encephalopathy secondary to his ongoing critical illness and prior cardiac  arrests and prior strokes and cirrhosis with hepatic encephalopathy. MRI of head of 12/20/22 reveals no PRES or leptomeningeal enhancement but scattered.  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   Plan:  1.  Keppra and vimpat indefinitely.  Neurology follow-up.  2.  Anticoagulation indefinitely for secondary stroke prevention.  3. Tylenol TID for headache.        Renal/Electrolytes:  1. LORETTA, ?CRI: Now on iHD.  No return of renal function.  - MW schedule      ID:  1. 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 4.  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  Have had some conversations within our group and with transplant ID and general infectious disease.  General consensus is that ganciclovir probably could be discontinued  2. H/o Strep/Parainfluenza infection last hospitalization. Completed treatment course  3. 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  4.  Lactobacillus growing in the broth 4 days after paracentesis.  Cell count was very low.  5.  Candida in left pleural fluid cultures.  Sensitivities not resulted.  6.  Immunosuppressed on tacrolimus.  Discussed with transplant service at the [facility] who felt that given the absence of rejection on his most recent biopsy and ongoing issues with infection continuing with 1 twice daily tacrolimus is the best current option.  They have seen his subtherapeutic trough levels.  Plan:  1.  Confirm with transplant ID whether or not we can stop ganciclovir.  2.  Unasyn for 2 weeks total.  Change to Augmentin through 1/12/2023.  3.  At least 4 weeks of antimicrobial for the Candida.  If sensitive to fluconazole could de-escalate from micafungin.  4.  Continue tacrolimus as above and we are tapering steroids     GI/:  Post transplant cirrhosis.  Main manifestations of this are hepatic encephalopathy and ascites.  Hepatologist at [facility] felt that most likely reason for the cirrhosis was metabolic syndrome or alcohol intake.  There was  no evidence of rejection on biopsy and there was some evidence of regeneration. Paracentesis done on 12/22/2022 with lack of bacillus in the broth indicating SBP low cell count was very low  Plan:  1.  Intermittent paracentesis as needed.  2.  Getting 1 mg  tacrolimus BID and tapering steroids   3. Lactulose and Rifaximin as above  4.  Treat SBP for 2 weeks.  5.  He has an appointment in April with Dr. Simms if he is out of the hospital.     Endocrine:  1. DM.  Titrating insulin. He needs supplemental coverage.  2. He is on steroids which we are weaning  Plan:  Continue with current regimen.      Nutrition:  At goal tube feeds via PEG tube     Heme/Onc:  1. Pancytopenia; possibly due cirrhosis. WBC count has improved . Chronic splenomegaly was present prior to liver transplant but has not regressed.   2. Needs anticoagulation for AFib.   Plan:  1.  Continue Eliquis      ICU prophylaxis:      DVT PPX: Eliquis      Restraints needed: No     Stress ulcer: IV PPI     Central line: Needed for IV access/Meds.     Code Status: Full     Diet: Adult Formula Drip Feeding: Continuous Novasource Renal; Gastrostomy; Goal Rate: 60; mL/hr    DVT Prophylaxis: DOAC  Nixon Catheter: Not present  Central Lines: PRESENT      Cardiac Monitoring: None  Code Status: Full Code      Disposition Plan         The patient's care was discussed with the Bedside Nurse, Patient and Patient's Family.    Edgar Fritz MD  Hospitalist Service  [facility]  Securely message with the Vocera Web Console (learn more here)  Text page via [employer] Paging/Directory         Clinically Significant Risk Factors              # Hypoalbuminemia: Lowest albumin = 1.8 g/dL at 12/29/2022  4:40 AM, will monitor as appropriate             # Severe Malnutrition: based on nutrition assessment, PRESENT ON ADMISSION       ______________________________________________________________________    Interval History   Patient is sitting in recliner and is in no distress.  Family at  bedside.  He has tracheostomy and is on vent.  He is comfortable.  He did not verbalize any concerns or discomfort.  Patient denies any chest pain or shortness of breath or abdominal pain or nausea or vomiting or current headache.  He has watery diarrhea due to lactulose.  Lactulose dosage was decreased to twice daily.  Patient is on trach dome with tube feeding and rectal tube in place.  He is on hemodialysis 3 days a week.  Vital signs are blood glucose are stable.    Data reviewed today: I reviewed all medications, new labs and imaging results over the last 24 hours. I personally reviewed no images or EKG's today.    Physical Exam   Vital Signs: Temp: 98.1  F (36.7  C) Temp src: Oral BP: (!) 143/89 Pulse: 98   Resp: 18 SpO2: 99 % O2 Device: Trach dome Oxygen Delivery: 30 LPM  Weight: 209 lbs 8 oz  Patient is sitting in bed.  Is in no acute distress.  Looks pale and chronically ill.  He communicates and is pleasant and cooperative.  He is on ventilator through tracheostomy  HEENT: Nl oral mucosal moisture.  Pale mucosa, no icterus.  Neck: Tracheostomy in place.  Right external jugular CVC.  Heart: Regular rate and rhythm, no murmur  Abdomen: Moderately enlarged due to ascites.  Nontender.  No mass or organomegaly noted.  Patient has rectal tube.  PEG tube in place.  : Deferred  Extremities: No edema.  PICC line in right brachial artery.  Neurological exam: Patient is alert and oriented.  Has normal behavior.  Gross cranial nerve neurological exam did not show any major abnormality.  Left  is weaker than the right otherwise no focal neurological deficit noted in limited neurological exam. Gait and station were not examined.    Data   Recent Labs   Lab 01/02/23  0813 01/02/23  0527 01/02/23  0418 01/01/23  0759 01/01/23  0626 12/31/22  0826 12/31/22  0610 12/27/22  0429 12/27/22  0420   WBC  --   --   --   --   --   --   --   --  7.5   HGB  --   --   --   --   --   --   --   --  8.8*   MCV  --   --   --   --    --   --   --   --  107*   PLT  --   --   --   --   --   --   --   --  63*   NA  --  134*  --   --  142  --  137   < > 139   POTASSIUM  --  3.7  --   --  3.7  --  3.7   < > 3.1*   CHLORIDE  --  95*  --   --  101  --  99   < > 102   CO2  --  29  --   --  30*  --  29   < > 29   BUN  --  74.2*  --   --  61.1*  --  44.1*   < > 52*   CR  --  3.06*  --   --  2.72*  --  2.23*   < > 2.61*   ANIONGAP  --  10  --   --  11  --  9   < > 8   KELLY  --  8.5*  --   --  8.6  --  8.0*   < > 8.1*   * 154* 130*   < > 162*   < > 150*   < > 127*   ALBUMIN  --  2.2*  --   --  2.4*  --  2.2*   < > 2.1*    < > = values in this interval not displayed.     Medications     dextrose       - MEDICATION INSTRUCTIONS -         acetaminophen  975 mg Per Feeding Tube Q8H BIJU    Or     acetaminophen  650 mg Rectal Q8H BIJU     acetylcysteine  2 mL Nebulization BID     amoxicillin-clavulanate  500 mg Per Feeding Tube QPM     amoxicillin-clavulanate  500 mg Per Feeding Tube Once per day on Mon Wed Fri     apixaban ANTICOAGULANT  5 mg Oral or Feeding Tube BID     B and C vitamin Complex with folic acid  5 mL Per Feeding Tube Daily     carboxymethylcellulose PF  1 drop Both Eyes TID     chlorhexidine  15 mL Mouth/Throat BID     epoetin mirta-epbx  10,000 Units Subcutaneous Once per day on Mon Wed Fri     folic acid  1 mg Oral or Feeding Tube Daily     insulin aspart  1-6 Units Subcutaneous Q4H BIJU     insulin glargine  20 Units Subcutaneous Daily     ipratropium - albuterol 0.5 mg/2.5 mg/3 mL  3 mL Nebulization 4x daily     lacosamide  100 mg Oral or Feeding Tube BID     lactulose  20 g Oral or Feeding Tube TID     levETIRAcetam  1,000 mg Oral or Feeding Tube Q24H     micafungin  100 mg Intravenous Q24H     midodrine  10 mg Oral or Feeding Tube TID w/meals     mineral oil-hydrophilic petrolatum   Topical Daily     nystatin  500,000 Units Swish & Swallow 4x Daily     pantoprazole  40 mg Per Feeding Tube BID     QUEtiapine  50 mg Oral or Feeding Tube At  Bedtime     rifaximin  550 mg Per Feeding Tube BID     sodium chloride (PF)  9 mL Intracatheter During Dialysis/CRRT (from stock)     sodium chloride (PF)  9 mL Intracatheter During Dialysis/CRRT (from stock)     tacrolimus  1 mg Oral or Feeding Tube BID

## 2023-01-02 NOTE — PLAN OF CARE
Problem: Mechanical Ventilation Invasive  Goal: Optimal Device Function  Outcome: Progressing  Goal: Mechanical Ventilation Liberation  Outcome: Progressing  Goal: Absence of Device-Related Skin and Tissue Injury  Outcome: Progressing  Goal: Absence of Ventilator-Induced Lung Injury  Outcome: Progressing   RT PROGRESS NOTE     DATA:     CURRENT SETTINGS: AC 12, 450, +5, 26% @Night             TRACH TYPE / SIZE: # 6 SHILEY  XLT  PROXIMAL Placed on 11/29/2022             MODE: AC             FIO2:  21%     ACTION:             THERAPIES: Duoneb/Mucomyst QID              SUCTION:                           FREQUENCY: 3                        AMOUNT: Small to mod                        CONSISTENCY: thick                        COLOR: tan, yellow              SPONTANEOUS COUGH EFFORT/STRENGTH OF EFFORT (not elicited by suctioning):not observed                               WEANING PHASE: 1 and 2                         WEAN MODE: PS 8/5                         WEAN TIME:1 hr 40min                        END WEAN REASON: for the night      RESPONSE: Pt tolerated PS well, no distress noted at the end of the wean. Continue current POC

## 2023-01-02 NOTE — PROGRESS NOTES
RENAL PROGRESS NOTE    ASSESSMENT & PLAN:   Anuric LORETTA requiring dialysis: After PEA arrest, was on CRRT which was discontinued 11/26/2022 and now started on intermittent hemodialysis ongoing since 11/29/2022.  He is maintained on a MWF schedule.  No signs of renal recovery yet.    -Start diuretic challenge and bladder scans->remains anuric.  Stop torsemide and bladder scans.  -Target UF 1.5-2.5kg  -Midodrine to support blood pressure and UF.   -MWF HD at [facility]     Anemia of chronic renal failure:   Hematochezia   Left hemothorax.    Erythropoietin 10,000 units 3 times weekly on dialysis days.    12/30 Isat 22, ferritin 426.    Weekly hgb.  Check Wednesday.       Chronic hypotension: Likely due to chronic liver disease.  Midodrine 10 mg 3 times daily with parameters to hold for SBP>110     Hypokalemia: Needing intermittent replacement.  He is higher K bath with HD.     Acute on chronic hypoxic respiratory failure: S/p tracheostomy 11/29/2022.  Complicated by left hemopneumothorax s/p left chest tube.  History of aspergillus PNA and multiple bacterial PNA. On micafungin.  Mgmt per pulm      CARRILLO cirrhosis s/p liver transplant: Immunosuppression per GI.  Tacrolimus and prednisone.  Paracentesis as indicated per GI.     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  Initial seizure after PEA arrest, then another seizure again 12/21/2022 during dialysis session.  Initially on acyclovir and then switched over to ganciclovir, since been discontinued.  On Keppra and Vimpat.  Management per neurology and ID    SUBJECTIVE:  Sitting up in chair.  Family in room.  Blanco is feeling worn out today.  Has been up in the chair for about an hour.  Plan is to keep on vent today, tolerating thus far.   +ascites, but not bothersome/uncomfortable (4L tapped on 12/22)  +nausea.  +dizziness.      OBJECTIVE:  Physical Exam   Temp: 98.1  F (36.7  C) Temp src: Oral BP: (!) 143/89 Pulse: 92   Resp: 18 SpO2: 100 %  O2 Device: Trach dome Oxygen Delivery: 30 LPM  Vitals:    12/31/22 0450 01/01/23 0556 01/02/23 0521   Weight: 93.3 kg (205 lb 11.2 oz) 95 kg (209 lb 6.4 oz) 95 kg (209 lb 8 oz)     Vital Signs with Ranges  Temp:  [97.6  F (36.4  C)-98.1  F (36.7  C)] 98.1  F (36.7  C)  Pulse:  [] 92  Resp:  [18-24] 18  BP: (130-143)/(78-89) 143/89  FiO2 (%):  [24 %-40 %] 30 %  SpO2:  [91 %-100 %] 100 %  I/O last 3 completed shifts:  In: 2263 [I.V.:244; NG/GT:2019]  Out: 850 [Stool:850]        Patient Vitals for the past 72 hrs:   Weight   01/02/23 0521 95 kg (209 lb 8 oz)   01/01/23 0556 95 kg (209 lb 6.4 oz)   12/31/22 0450 93.3 kg (205 lb 11.2 oz)   12/30/22 1716 92.1 kg (203 lb 0.7 oz)       PHYSICAL EXAM:  General: Alert, NAD, answers questions with nodding/mouthing words  HENT: NT, trach  Eyes: No scleral icterus  Cardiovascular: RRR, No peripheral edema. Pedal pulses palpable bilaterally.  Respiratory: mildly coarse ant, trach dome  Gastrointestinal:  distended with ascites.   Integumentary: Warm, dry  Neurologic: Non focal   Psychiatric: Cooperative  : No Nixon  Access: Right tunneled CVC.      LABORATORY STUDIES:     Recent Labs   Lab 12/27/22  0420   WBC 7.5   RBC 2.76*   HGB 8.8*   HCT 29.4*   PLT 63*       Basic Metabolic Panel:  Recent Labs   Lab 01/02/23  0813 01/02/23  0527 01/02/23  0418 01/01/23  2348 01/01/23 2011 01/01/23  1606 01/01/23  0759 01/01/23  0626 12/31/22  0826 12/31/22  0610 12/30/22  1628 12/30/22  1454 12/30/22  0817 12/30/22  0651 12/29/22  0549 12/29/22  0440 12/28/22  0430 12/28/22  0428   NA  --  134*  --   --   --   --   --  142  --  137  --   --   --  142  --  137  --  139   POTASSIUM  --  3.7  --   --   --   --   --  3.7  --  3.7  --  4.1  --  3.4  --  3.4  --  3.7  3.7   CHLORIDE  --  95*  --   --   --   --   --  101  --  99  --   --   --  102  --  103  --  103   CO2  --  29  --   --   --   --   --  30*  --  29  --   --   --  30*  --  31  --  29   BUN  --  74.2*  --   --   --   --    --  61.1*  --  44.1*  --   --   --  61.0*  --  46*  --  62*   CR  --  3.06*  --   --   --   --   --  2.72*  --  2.23*  --   --   --  3.02*  --  2.40*  --  3.15*   * 154* 130* 152* 140* 137*   < > 162*   < > 150*   < >  --    < > 134*   < > 160*   < > 117*   KELLY  --  8.5*  --   --   --   --   --  8.6  --  8.0*  --   --   --  8.1*  --  7.8*  --  7.7*    < > = values in this interval not displayed.       INRNo lab results found in last 7 days.     Recent Labs   Lab Test 12/27/22  0420 12/26/22  0436 12/22/22  1108 12/21/22  1315 12/17/22  0519 12/16/22  2239   INR  --   --   --  1.36*  --  1.14   WBC 7.5 4.8   < > 3.4*   < > 3.9*   HGB 8.8* 8.1*   < > 8.0*   < > 7.9*  7.9*   PLT 63* 55*   < > 75*   < > 94*    < > = values in this interval not displayed.       Personally reviewed current labs       Linda Mcmahon NP  [facility]  [phone]

## 2023-01-02 NOTE — PROGRESS NOTES
01/02/23 1500   Name of Certified Therapeutic Rec Specialist   Name of Certified Therapeutic Rec Specialist DONTAE Morales   Appointment Type   Type of Therapeutic Rec Session Therapeutic Rec Assessment   General Information   Patient Profile Review See Profile for full history and prior level of function   Daily Contact with Relatives or Friends Visit   Community Involvement   Spiritual Practice Yazidism   Outings Dinner   Hobbies/Interests   Cards Other (see comments)  (Rook)   Games Other (comments)  (Dominoes)   Word Puzzles Word Search;Crossword;Sudoko   Music   Music Preferences Spiritual   Listens to Recorded Music Yes   Brought Own Equipment No   Media   Computer Has own at home   TV / Movies TV   Reading Books;Has own reading materials   Reading Preferences Other (see comments)  (Bible)   Sports / Physical Activities   Outdoor Activities Fishing;Boating  (camping, Snowmobiling, ATV)   Sports/Exercise Golf;Walks   Sports Fan Basketball;Football;Golf   Impression   Open to Socializing with Others Unable (please describe in comments);Other (comments)  (has trach)   Barriers to Leisure Endurance;Mobility;Sitting tolerance   Patient, family and / or staff in agreement with Plan of Care Yes   Treatment Plan   Equipment and Supplies While on Unit Radio   Assessment Assessment completed, pt's wife also present to assist with leisure questions. Pt often looked to wife to answer questions for him. Pt requested and was provided with radio and [employer] game. Will provide materials as requested.

## 2023-01-02 NOTE — PHARMACY-IMMUNOSUPPRESSION MONITORING
Tacrolimus level = 3.2, drawn 1/2/23@ 0527  Last dose given 1/1 @ 1837.   This is a 11-hour level.  Current dose: 1mg bid   Tacrolimus  goal: 3-5 per transplant team.  New or change in medications with potentially significant interactions with  Tacrolimus: None  Recommendations from transplant team, coordinatorJerrica: no change, continue weekly levels on Mondays  Orders placed  : N/A  Note written : Yes      Mariah Iglesias RPh,BCCCP, BCCP

## 2023-01-03 ENCOUNTER — APPOINTMENT (OUTPATIENT)
Dept: OCCUPATIONAL THERAPY | Facility: CLINIC | Age: 58
DRG: 207 | End: 2023-01-03
Attending: HOSPITALIST
Payer: COMMERCIAL

## 2023-01-03 ENCOUNTER — APPOINTMENT (OUTPATIENT)
Dept: PHYSICAL THERAPY | Facility: CLINIC | Age: 58
DRG: 207 | End: 2023-01-03
Attending: HOSPITALIST
Payer: COMMERCIAL

## 2023-01-03 LAB
GLUCOSE BLDC GLUCOMTR-MCNC: 143 MG/DL (ref 70–99)
GLUCOSE BLDC GLUCOMTR-MCNC: 178 MG/DL (ref 70–99)
PHOSPHATE SERPL-MCNC: 2.2 MG/DL (ref 2.5–4.5)

## 2023-01-03 PROCEDURE — 84100 ASSAY OF PHOSPHORUS: CPT | Performed by: HOSPITALIST

## 2023-01-03 PROCEDURE — 250N000009 HC RX 250: Performed by: HOSPITALIST

## 2023-01-03 PROCEDURE — 250N000013 HC RX MED GY IP 250 OP 250 PS 637: Performed by: HOSPITALIST

## 2023-01-03 PROCEDURE — 250N000011 HC RX IP 250 OP 636: Performed by: NURSE PRACTITIONER

## 2023-01-03 PROCEDURE — 120N000017 HC R&B RESPIRATORY CARE

## 2023-01-03 PROCEDURE — 94640 AIRWAY INHALATION TREATMENT: CPT

## 2023-01-03 PROCEDURE — 250N000013 HC RX MED GY IP 250 OP 250 PS 637: Performed by: NURSE PRACTITIONER

## 2023-01-03 PROCEDURE — 250N000012 HC RX MED GY IP 250 OP 636 PS 637: Performed by: HOSPITALIST

## 2023-01-03 PROCEDURE — 999N000009 HC STATISTIC AIRWAY CARE

## 2023-01-03 PROCEDURE — 258N000003 HC RX IP 258 OP 636: Performed by: HOSPITALIST

## 2023-01-03 PROCEDURE — 94640 AIRWAY INHALATION TREATMENT: CPT | Mod: 76

## 2023-01-03 PROCEDURE — 250N000009 HC RX 250: Performed by: NURSE PRACTITIONER

## 2023-01-03 PROCEDURE — 94003 VENT MGMT INPAT SUBQ DAY: CPT

## 2023-01-03 PROCEDURE — 97110 THERAPEUTIC EXERCISES: CPT | Mod: GO | Performed by: OCCUPATIONAL THERAPIST

## 2023-01-03 PROCEDURE — 999N000253 HC STATISTIC WEANING TRIALS

## 2023-01-03 PROCEDURE — 99232 SBSQ HOSP IP/OBS MODERATE 35: CPT | Performed by: HOSPITALIST

## 2023-01-03 PROCEDURE — 94003 VENT MGMT INPAT SUBQ DAY: CPT | Performed by: NURSE PRACTITIONER

## 2023-01-03 PROCEDURE — 999N000157 HC STATISTIC RCP TIME EA 10 MIN

## 2023-01-03 PROCEDURE — 97110 THERAPEUTIC EXERCISES: CPT | Mod: GP | Performed by: PHYSICAL THERAPIST

## 2023-01-03 PROCEDURE — 97530 THERAPEUTIC ACTIVITIES: CPT | Mod: GP | Performed by: PHYSICAL THERAPIST

## 2023-01-03 PROCEDURE — 258N000003 HC RX IP 258 OP 636: Performed by: NURSE PRACTITIONER

## 2023-01-03 RX ORDER — ZOLPIDEM TARTRATE 5 MG/1
5 TABLET ORAL
Status: DISCONTINUED | OUTPATIENT
Start: 2023-01-03 | End: 2023-01-21 | Stop reason: HOSPADM

## 2023-01-03 RX ORDER — FLUCONAZOLE 200 MG/1
200 TABLET ORAL AT BEDTIME
Status: DISCONTINUED | OUTPATIENT
Start: 2023-01-03 | End: 2023-01-17

## 2023-01-03 RX ORDER — ACETYLCYSTEINE 200 MG/ML
2 SOLUTION ORAL; RESPIRATORY (INHALATION)
Status: DISCONTINUED | OUTPATIENT
Start: 2023-01-03 | End: 2023-01-18

## 2023-01-03 RX ORDER — ALBUTEROL SULFATE 0.83 MG/ML
2.5 SOLUTION RESPIRATORY (INHALATION)
Status: DISCONTINUED | OUTPATIENT
Start: 2023-01-03 | End: 2023-01-21 | Stop reason: HOSPADM

## 2023-01-03 RX ADMIN — Medication 12.5 MG: at 03:37

## 2023-01-03 RX ADMIN — TACROLIMUS 1 MG: 5 CAPSULE ORAL at 17:34

## 2023-01-03 RX ADMIN — APIXABAN 5 MG: 2.5 TABLET, FILM COATED ORAL at 08:18

## 2023-01-03 RX ADMIN — NYSTATIN 500000 UNITS: 100000 SUSPENSION ORAL at 22:14

## 2023-01-03 RX ADMIN — FLUCONAZOLE 200 MG: 200 TABLET ORAL at 22:06

## 2023-01-03 RX ADMIN — SODIUM PHOSPHATE, MONOBASIC, MONOHYDRATE 15 MMOL: 276; 142 INJECTION, SOLUTION INTRAVENOUS at 21:51

## 2023-01-03 RX ADMIN — RIFAXIMIN 550 MG: 550 TABLET ORAL at 22:14

## 2023-01-03 RX ADMIN — ACETYLCYSTEINE 2 ML: 200 SOLUTION ORAL; RESPIRATORY (INHALATION) at 08:52

## 2023-01-03 RX ADMIN — ACETAMINOPHEN 975 MG: 325 TABLET, FILM COATED ORAL at 06:23

## 2023-01-03 RX ADMIN — MIDODRINE HYDROCHLORIDE 10 MG: 5 TABLET ORAL at 08:18

## 2023-01-03 RX ADMIN — IPRATROPIUM BROMIDE AND ALBUTEROL SULFATE 3 ML: 2.5; .5 SOLUTION RESPIRATORY (INHALATION) at 08:52

## 2023-01-03 RX ADMIN — Medication 6 MG: at 22:07

## 2023-01-03 RX ADMIN — RIFAXIMIN 550 MG: 550 TABLET ORAL at 08:27

## 2023-01-03 RX ADMIN — Medication 40 MG: at 22:08

## 2023-01-03 RX ADMIN — ALBUTEROL SULFATE 2.5 MG: 2.5 SOLUTION RESPIRATORY (INHALATION) at 20:21

## 2023-01-03 RX ADMIN — Medication 5 ML: at 08:17

## 2023-01-03 RX ADMIN — Medication 1 DROP: at 08:18

## 2023-01-03 RX ADMIN — NYSTATIN 500000 UNITS: 100000 SUSPENSION ORAL at 17:34

## 2023-01-03 RX ADMIN — CHLORHEXIDINE GLUCONATE 0.12% ORAL RINSE 15 ML: 1.2 LIQUID ORAL at 22:08

## 2023-01-03 RX ADMIN — Medication 1 DROP: at 22:06

## 2023-01-03 RX ADMIN — NYSTATIN 500000 UNITS: 100000 SUSPENSION ORAL at 08:17

## 2023-01-03 RX ADMIN — MICAFUNGIN SODIUM 100 MG: 100 INJECTION, POWDER, LYOPHILIZED, FOR SOLUTION INTRAVENOUS at 17:34

## 2023-01-03 RX ADMIN — ALBUTEROL SULFATE 2.5 MG: 2.5 SOLUTION RESPIRATORY (INHALATION) at 16:10

## 2023-01-03 RX ADMIN — LACOSAMIDE 100 MG: 10 SOLUTION ORAL at 22:07

## 2023-01-03 RX ADMIN — ACETYLCYSTEINE 2 ML: 200 SOLUTION ORAL; RESPIRATORY (INHALATION) at 16:10

## 2023-01-03 RX ADMIN — FOLIC ACID 1 MG: 1 TABLET ORAL at 08:18

## 2023-01-03 RX ADMIN — NYSTATIN 500000 UNITS: 100000 SUSPENSION ORAL at 13:08

## 2023-01-03 RX ADMIN — CHLORHEXIDINE GLUCONATE 0.12% ORAL RINSE 15 ML: 1.2 LIQUID ORAL at 08:18

## 2023-01-03 RX ADMIN — LACTULOSE 20 G: 20 SOLUTION ORAL at 22:08

## 2023-01-03 RX ADMIN — QUETIAPINE FUMARATE 50 MG: 50 TABLET ORAL at 22:06

## 2023-01-03 RX ADMIN — ACETAMINOPHEN 975 MG: 325 TABLET, FILM COATED ORAL at 22:05

## 2023-01-03 RX ADMIN — WHITE PETROLATUM: 1.75 OINTMENT TOPICAL at 08:20

## 2023-01-03 RX ADMIN — LACTULOSE 20 G: 20 SOLUTION ORAL at 08:18

## 2023-01-03 RX ADMIN — Medication 1 DROP: at 13:13

## 2023-01-03 RX ADMIN — ACETYLCYSTEINE 2 ML: 200 SOLUTION ORAL; RESPIRATORY (INHALATION) at 20:20

## 2023-01-03 RX ADMIN — ACETAMINOPHEN 650 MG: 650 SOLUTION ORAL at 03:37

## 2023-01-03 RX ADMIN — TACROLIMUS 1 MG: 5 CAPSULE ORAL at 08:17

## 2023-01-03 RX ADMIN — LACOSAMIDE 100 MG: 10 SOLUTION ORAL at 08:17

## 2023-01-03 RX ADMIN — LEVETIRACETAM 1000 MG: 100 SOLUTION ORAL at 22:07

## 2023-01-03 RX ADMIN — ACETAMINOPHEN 975 MG: 325 TABLET, FILM COATED ORAL at 13:11

## 2023-01-03 RX ADMIN — APIXABAN 5 MG: 2.5 TABLET, FILM COATED ORAL at 22:06

## 2023-01-03 RX ADMIN — AMOXICILLIN AND CLAVULANATE POTASSIUM 500 MG: 400; 57 POWDER, FOR SUSPENSION ORAL at 22:07

## 2023-01-03 RX ADMIN — IPRATROPIUM BROMIDE AND ALBUTEROL SULFATE 3 ML: 2.5; .5 SOLUTION RESPIRATORY (INHALATION) at 12:20

## 2023-01-03 RX ADMIN — INSULIN GLARGINE 20 UNITS: 100 INJECTION, SOLUTION SUBCUTANEOUS at 08:19

## 2023-01-03 RX ADMIN — Medication 40 MG: at 08:18

## 2023-01-03 ASSESSMENT — ACTIVITIES OF DAILY LIVING (ADL)
ADLS_ACUITY_SCORE: 62

## 2023-01-03 NOTE — PROGRESS NOTES
Social Work Note:  Patient chart reviewed.  Patient discussed in morning rounds.  Barriers to discharge:   * Trach. Vent. Phase 1  * Rectal tube.   * Suction x6     Patient a return/readmit to [facility] on 12/29/22.  Patient her with Trach, vent, Nebs:Duoneb,Mucomyst QID,  PEG, rectal tube, dialysis.    At baseline, patient was living at home with spouse-Ofe.  Patient was independent at baseline.          Kb Liu, [facility] [place]  [phone]

## 2023-01-03 NOTE — PROGRESS NOTES
HD Progress Note    Assessment: Pt alert, responds to questions, denied c/o.  Lungs clear, no edema present.    Vascular Access: RIJ CVC patent, aspirated and flushed well. No s/s of infection. -400 with good pressures. Ports locked with Heparin post Tx.    Dialyzer/Rinse: 160NRE / clear    HD time: 3hrs    HD fluid removal goal: 1.5kg    Treatment: Pt stable during run, no complications. Crit line used and able to remove 1.5 kg.     Fluid Removed Total: 1500 ml              Net:  1100 ml    Interventions: VS monitoring q 15 min    Plan:Per Renal Team

## 2023-01-03 NOTE — PLAN OF CARE
Problem: Artificial Airway  Goal: Effective Communication  Outcome: Progressing  Goal: Optimal Device Function  Outcome: Progressing  Intervention: Optimize Device Care and Function  Recent Flowsheet Documentation  Taken 1/3/2023 0404 by Lyudmila Galvin RT  Airway Safety Measures: all equipment/monitors on and audible  Goal: Absence of Device-Related Skin or Tissue Injury  Outcome: Progressing     Problem: Mechanical Ventilation Invasive  Goal: Effective Communication  Outcome: Progressing  Goal: Optimal Device Function  Outcome: Progressing  Intervention: Optimize Device Care and Function  Recent Flowsheet Documentation  Taken 1/3/2023 0404 by Lyudmila Galvin RT  Airway Safety Measures: all equipment/monitors on and audible  Goal: Mechanical Ventilation Liberation  Outcome: Progressing  Goal: Optimal Nutrition Delivery  Outcome: Progressing  Goal: Absence of Device-Related Skin and Tissue Injury  Outcome: Progressing  Goal: Absence of Ventilator-Induced Lung Injury  Outcome: Progressing     RT PROGRESS NOTE     DATA:     CURRENT SETTINGS: 12, 450, +5             TRACH TYPE / SIZE: #6 XLT Shiley proximal, placed on 11/29.             MODE: AC              FIO2: 24%     ACTION:             THERAPIES: Duoneb,Mucomyst QID              SUCTION:  by RT & RN                         FREQUENCY: X6                        AMOUNT: small to large                        CONSISTENCY: thick,some blood clots                         COLOR: bloody, pink, blood tinged              SPONTANEOUS COUGH EFFORT/STRENGTH OF EFFORT (not elicited by suctioning): good congestive productive                            WEANING PHASE: 2                        WEAN MODE:  30%/30L TM/cuff up                         WEAN TIME: 13:30                        END WEAN REASON: desaturation to 85% during nurse care while laying flat.      RESPONSE:             BS: coarse             VITAL SIGNS: /70 (BP Location: Left arm)   Pulse 104   Temp 98   F (36.7  C) (Oral)   Resp 22   Wt 95 kg (209 lb 8 oz)   SpO2 100%   BMI 28.41 kg/m                EMOTIONAL NEEDS / CONCERNS:                  RISK FOR SELF DECANNULATION:                          RISK DUE TO:                          INTERVENTION/S IN PLACE IS/ARE:         NOTE / PLAN: Patient on vent. Suctioned bloody/pink /some blood clots secretion from the trach. Consider to hold blood thinner if possible. Neb txs given as order. Will cont to monitor.

## 2023-01-03 NOTE — PLAN OF CARE
"Pt was awake all night, reports \" just not tired.\" Prn Seroquel and tylenol given for restlessness and a headache. Pt was suctioned often for thick/thin secretions with some blood at start of shift in secretions then cleared up with each suction. Able to voice his needs. Remains confused at times, did not witness any pulling at tubes through the night.  Some confusion noted \" asked what day it was 4 x s. Reminders were given on how to use his call light. BS are changed to daily. And sliding scale daily now. Tolerated repositions every two hours. Rectal tube bi-passed 1x at start of shift. With careful repositioned off tube, no more bypassing noted.       Problem: Mechanical Ventilation Invasive  Goal: Mechanical Ventilation Liberation  Intervention: Promote Extubation and Mechanical Ventilation Liberation  Recent Flowsheet Documentation  Taken 1/3/2023 0057 by Sarah Keith RN  Medication Review/Management: medications reviewed  Goal: Absence of Ventilator-Induced Lung Injury  Intervention: Prevent Ventilator-Associated Pneumonia  Recent Flowsheet Documentation  Taken 1/3/2023 0106 by Sarah Keith RN  Head of Bed (HOB) Positioning: HOB at 45 degrees     Problem: Plan of Care - These are the overarching goals to be used throughout the patient stay.    Goal: Absence of Hospital-Acquired Illness or Injury  Intervention: Identify and Manage Fall Risk  Recent Flowsheet Documentation  Taken 1/3/2023 0057 by Sarah Keith RN  Safety Promotion/Fall Prevention: assistive device/personal items within reach  Intervention: Prevent Skin Injury  Recent Flowsheet Documentation  Taken 1/3/2023 0106 by Sarah Keith RN  Body Position:   right   turned  Intervention: Prevent Infection  Recent Flowsheet Documentation  Taken 1/3/2023 0057 by Sarah Keith RN  Infection Prevention: equipment surfaces disinfected  Goal: Optimal Comfort and Wellbeing  Intervention: Monitor Pain and Promote Comfort  Recent " Flowsheet Documentation  Taken 1/3/2023 0617 by Sarah Keith, RN  Pain Management Interventions: medication (see MAR)     Problem: Malnutrition  Goal: Improved Nutritional Intake  Outcome: Progressing     Problem: Enteral Nutrition  Goal: Absence of Aspiration Signs and Symptoms  Outcome: Progressing  Intervention: Minimize Aspiration Risk  Recent Flowsheet Documentation  Taken 1/3/2023 0106 by Sarah Keith, RN  Head of Bed (HOB) Positioning: HOB at 45 degrees     Problem: Chronic Kidney Disease  Goal: Optimal Coping with Chronic Illness  Outcome: Progressing  Goal: Acceptable Pain Control  Intervention: Prevent or Manage Pain  Recent Flowsheet Documentation  Taken 1/3/2023 0617 by Sarah Keith, RN  Pain Management Interventions: medication (see MAR)  Goal: Minimize Renal Failure Effects  Intervention: Monitor and Support Renal Function  Recent Flowsheet Documentation  Taken 1/3/2023 0057 by Sarah Keith, RN  Medication Review/Management: medications reviewed  Intervention: Protect and Monitor Dialysis Access Site  Recent Flowsheet Documentation  Taken 1/3/2023 0057 by Sarah Keith RN  Safety Precautions: emergency equipment at bedside   Goal Outcome Evaluation:

## 2023-01-03 NOTE — PLAN OF CARE
Problem: Plan of Care - These are the overarching goals to be used throughout the patient stay.    Goal: Plan of Care Review  Outcome: Progressing     Problem: Plan of Care - These are the overarching goals to be used throughout the patient stay.    Goal: Optimal Comfort and Wellbeing  Outcome: Not Progressing  Intervention: Monitor Pain and Promote Comfort  Recent Flowsheet Documentation  Taken 1/2/2023 2148 by Kailee Metz, RN  Pain Management Interventions:   medication (see MAR)   emotional support   quiet environment facilitated   repositioned   rest   therapeutic presence  Taken 1/2/2023 1541 by Kailee Metz, RN  Pain Management Interventions: (stated that he felt much better after being transferred into bed)   care clustered   emotional support   repositioned   therapeutic presence     Problem: Chronic Kidney Disease  Goal: Optimal Coping with Chronic Illness  Outcome: Not Progressing   Goal Outcome Evaluation:       Blanco appeared uncomfortable tonight. He could not tolerate having the head of his bed down for cares and turning from side to side for skin inspection and cleansing, as his rectal tube bypassed. His sats dropped into the low 80's when aide was in the room with him with the HOB flat for cares. This RN responded to alarm, raised HOB, offered suction. Blanco refused suction, although the RT did persuade him to allow it. The RT put him back on the vent, which allowed him to deal with the HOB being flattened much better. He was also hyperoxygenated for flattening of the bed for cares. Blanco did fall asleep after cares completed and meds all given.     Blanco admits to depression, though he denies any thoughts of trying to hurt himself.     Blanco has multiple bruises and senile purpura all over, particularly on his chest, back, arms, and abdomen.

## 2023-01-03 NOTE — PROGRESS NOTES
Pulmonary Progress Note    Admit Date: 12/29/2022  CODE: Full Code    Assessment/Plan:   57yoM with liver transplant(2016), recent prolonged hospitalization 11/12-12/15 for PEA cardiac arrest, Strep/Parainfluenza pneumonia with ARDS, MODS, right PTX requiring chest tube since removed.  Now HD dependent, s/p trach/PEG.  Discharged to LTAC, sent out the next day with new left PTX & GIB.  Recent hospitalization c/b GIB(resolved), PEA arrest and seizure on 12/20.  Left CT removed on 12/16.  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  Transferred back to LTAC 12/29.     Discussion of pertinent problems:  Acute hypoxic/hypercapnic respiratory failure s/p trach: multifactorial related to pneumonia(treated), ?aspergillus pna s/p 1 week of voriconazole, ARDS(resolved), b/l pneumothoraces(chest tubes removed), b/l pleural effusions & atelectasis(stable), pleural fluid cx growing Candida.  Progressing slowly with trach dome trials and AC at night.  fiO2 ~30% when on trach dome and 24% on the vent     Tracheostomy in place: 8 Shiley placed 11/29.  Changed to 6 Shiley prox xlt 12/8 unclear why     Dysphagia s/p PEG tube.  BDT 12/30 with no immediate but moderate delayed aspiration 45min later    Candida in left pleural fluid cultures: 4 weeks of antimicrobial currently on Micafungin.  Sensitive to fluconazole, page out to ID 1/2 for guidance on dosing, no callback.  400mg dose after dialysis lower doses on 200 non dialysis days.      LORETTA on MWF HD: followed by Nephrology     S/p Liver transplant with post transplant cirrhosis & ascites s/p paracentesis 12/22: on Tacro and prednisone    CHRISTIAN on home BiPAP     lactobacillus peritonitis on Unasyn transitioned to PO Augmentin.  Duration per ID    Recent seizure after hypoxic event on 12/20 with subsequent seizure 12/21 during dialysis: Keppra and vimpat  indefinitely     Multifocal acute ischemic strokes on MRI(12/20).  AC was on hold d/t GIB and left hemothorax, now resumed    Hx b/l pneumothoraces: right CT first placed 11/16, removed, then replaced by IR on 12/12, removed again 12/19.  Left chest tube placed 12/16, ?hemithorax, CT removed on 12/19    Hx mucus plugging of bronchi.  Bronch on 12/20 with minimal secretions.  Currently off metanebs, no secretion issues currently     Plan:   - Phase 2 weans: TM/cuff up during the day with AC night.    - PMV trials with SLP  - Will consider trach downsize to 7 Bivona this week - recheck platelets  - If continues to have bloody secretions with need to consider holding AC  - Bronchial hygiene with Duonebs QID, Mucomyst BID, Metanebs PRN(switch duonebs to albuterol to avoid anticholinergics and increase mucomsyt to QID)  - Fluid removal with dialysis - to keep on dry side given b/l pleural effusions on imaging - repeat CxR tomorrow   - Micafungin for candida isolation in left pleural cultures sensitive to fluconazole - Discussed with Dr. Hernández from ID and pharmacy.  Based on our guidelines, will likely dose 200mg daily given renal insufficiency on dialysis.  Pharm to confirm     Henry Fenton CNP  Pulmonary Medicine  [facility]  Pager [phone]    Subjective/Interim Events:   - hypoxia and orthopnea overnight on trach dome, improved when placed back on vent  - denies acute dyspnea, pain, n/v, fever, chills      Tracheal secretions:  Overnight - x6, sm/lg, thick with some blood clots   Yesterday - x5, sm/lg, thick     Cough strength: not witnessed    Current phase of ventilator weaning pathway:  Phase 2    Ventilator weaning results   12/29: PS 12/5 for 9hr  12/30-1/1: TM 3hr x2, otherwise PS 8/5 day, AC night   1/2: TM/cuff up for 13.5hr.  AC night     Clinical status discussed today with respiratory therapist, patient    Medications:       dextrose       - MEDICATION INSTRUCTIONS -         sodium chloride 0.9%  300  mL Hemodialysis Machine During Dialysis/CRRT (from stock)     acetaminophen  975 mg Per Feeding Tube Q8H BIJU    Or     acetaminophen  650 mg Rectal Q8H BIJU     acetylcysteine  2 mL Nebulization BID     amoxicillin-clavulanate  500 mg Per Feeding Tube QPM     amoxicillin-clavulanate  500 mg Per Feeding Tube Once per day on Mon Wed Fri     apixaban ANTICOAGULANT  5 mg Oral or Feeding Tube BID     B and C vitamin Complex with folic acid  5 mL Per Feeding Tube Daily     carboxymethylcellulose PF  1 drop Both Eyes TID     chlorhexidine  15 mL Mouth/Throat BID     epoetin mirta-epbx  10,000 Units Subcutaneous Once per day on Mon Wed Fri     folic acid  1 mg Oral or Feeding Tube Daily     heparin Lock (1000 units/mL High concentration)  1,900 Units Intracatheter During Dialysis/CRRT (from stock)     heparin Lock (1000 units/mL High concentration)  1,900 Units Intracatheter During Dialysis/CRRT (from stock)     insulin aspart  1-6 Units Subcutaneous Daily     insulin glargine  20 Units Subcutaneous Daily     ipratropium - albuterol 0.5 mg/2.5 mg/3 mL  3 mL Nebulization 4x daily     lacosamide  100 mg Oral or Feeding Tube BID     lactulose  20 g Oral or Feeding Tube BID     levETIRAcetam  1,000 mg Oral or Feeding Tube Q24H     Melatonin  6 mg Oral or NG Tube At Bedtime     micafungin  100 mg Intravenous Q24H     midodrine  10 mg Oral or Feeding Tube TID w/meals     mineral oil-hydrophilic petrolatum   Topical Daily     nystatin  500,000 Units Swish & Swallow 4x Daily     pantoprazole  40 mg Per Feeding Tube BID     QUEtiapine  50 mg Oral or Feeding Tube At Bedtime     rifaximin  550 mg Per Feeding Tube BID     sodium chloride (PF)  10-30 mL Intracatheter Q8H     sodium chloride (PF)  9 mL Intracatheter During Dialysis/CRRT (from stock)     sodium chloride (PF)  9 mL Intracatheter During Dialysis/CRRT (from stock)     tacrolimus  1 mg Oral or Feeding Tube BID         Exam/Data:   Vitals  BP (!) 153/88   Pulse (P) 104   Temp  97.8  F (36.6  C) (Oral)   Resp 21   Wt 94.8 kg (209 lb)   SpO2 (P) 100%   BMI 28.35 kg/m    BP - Mean:  [] 102  I/O last 3 completed shifts:  In: 2231.2 [I.V.:123.2; Other:400; NG/GT:1232]  Out: 1150 [Other:1100; Stool:50]  Weight change: -0.227 kg (-8 oz)    Vent Mode: Trach collar  FiO2 (%): 35 %  Resp Rate (Set): 12 breaths/min  Tidal Volume (Set, mL): 450 mL  PEEP (cm H2O): 5 cmH2O  Resp: 21      EXAM:  Gen: no distress in chair on trach dome   HEENT: NT, trach midline/intact, significant peristomal ecchymosis, cuff up   CV: RRR  Resp: CTAB; non-labored   Abd: large, soft, mildly tender, BS hypoactive  Skin: no visible rashes or lesions, scattered ecchymosis, fragile   Ext: mod right pedal edema, weak  Neuro: alert, follows commands, mouths words     ROS:  A 10-system review was obtained and is negative with the exception of the symptoms noted above.    Labs:  Basic Metabolic Panel  Recent Labs   Lab 01/03/23  0934 01/03/23  0642 01/02/23  1942 01/02/23  1606 01/02/23  0813 01/02/23  0527 01/01/23  0759 01/01/23  0626 12/31/22  0826 12/31/22  0610 12/30/22  1628 12/30/22  1454 12/30/22  0817 12/30/22  0651   NA  --   --   --   --   --  134*  --  142  --  137  --   --   --  142   POTASSIUM  --   --   --   --   --  3.7  --  3.7  --  3.7  --  4.1  --  3.4   CHLORIDE  --   --   --   --   --  95*  --  101  --  99  --   --   --  102   CO2  --   --   --   --   --  29  --  30*  --  29  --   --   --  30*   BUN  --   --   --   --   --  74.2*  --  61.1*  --  44.1*  --   --   --  61.0*   CR  --   --   --   --   --  3.06*  --  2.72*  --  2.23*  --   --   --  3.02*   * 178* 121* 127*   < > 154*   < > 162*   < > 150*   < >  --    < > 134*    < > = values in this interval not displayed.     Liver Function Tests  Recent Labs   Lab 01/02/23  0527 01/01/23  0626 12/31/22  0610 12/30/22  0651   ALBUMIN 2.2* 2.4* 2.2* 2.2*     CBC RESULTS: Recent Labs   Lab Test 12/27/22  0420   WBC 7.5   RBC 2.76*   HGB 8.8*   HCT  29.4*   *   MCH 31.9   MCHC 29.9*   RDW 18.7*   PLT 63*       RADIOLOGY: Personally reviewed; radiology read below   XR CHEST 12/27/2022     HISTORY: Shortness of breath, mucous plug?     COMPARISON: Chest radiograph 12/24/2022                                                                      IMPRESSION: Stable cardiomediastinal silhouette with unchanged  position of right upper extremity PICC and dialysis catheter.  Tracheostomy tube tip overlies the midthoracic trachea. Persistent  bilateral pleural effusions with associated bibasilar atelectasis. No  new airspace consolidation or lobar atelectasis. No pneumothorax.  Bones are unchanged. Percutaneous gastrostomy tube partially  visualized.

## 2023-01-03 NOTE — PROGRESS NOTES
[facility]    Medicine Progress Note - Hospitalist Service    Date of Admission:  12/29/2022    History of Present Illness  Blanco Osborne is an 57 year old male who is transferred from [facility] for rehab and continuation of IV antibiotic treatment.  Patient has multiple medical problems including history of liver transplant 2016, PAF on chronic anticoagulation, history of DM2, CVA, HTN, CHRISTIAN on BiPAP, and history of alcohol abuse in the past causing liver damage ending with liver transplant.  About 6 weeks ago he had respiratory arrest and was diagnosed with infection with parainfluenza and strep pneumoniae and acute on chronic renal insufficiency ending with CRF needing 3/week hemodialysis.  During this period he was diagnosed with cirrhosis of transplanted liver and hyperammonia.  Currently he is on Lactulose and Rifaximin.        On 12/14/22, due to CIP, he was transferred to [facility] for the first time with Trach, PEG and Rt chest tube.    On 12/16/2022 patient was transferred back to [facility] due to respiratory insufficiency secondary to hydropneumothorax and drainage of 900 cc bloody pleural effusion, bloody stool and transfusion of 2 units PRBC.      On 12/20/2022 he had another episode of PEA arrest followed by CPR x2 minutes and possibly had seizure activity in [facility]. 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  He has been on ventilator and has improved to trach dome tolerating up to 6 hours so far. He had chest tube which was removed.    He had SBP with growth of lactobacillus and is currently on Unasyn which will be switched to Augmentin orally through 1/12/2023.    Pleural effusion grew Candida and is on micafungin.  He is also on  due to cirrhosis and hyperammonia.  He is anemic and is on Retacrit.   He is on lacosamide and Keppra due to history of seizure.  There is  question of him drinking again causing him cirrhosis of the transplanted liver.      Assessment & Plan   Principal Problem:    Acute on chronic respiratory failure with hypoxia (H)  Active Problems:    Acquired immunocompromised state (H)    Cirrhosis of liver (H)    NAFLD (nonalcoholic fatty liver disease)    Liver transplanted (H)    Immunosuppression (H)    Acute kidney injury (H)    Spontaneous bacterial peritonitis (H)    Critical illness myopathy    History of sudden cardiac arrest, PEA    Dependent on hemodialysis (H)    Pulmonary:  Acute now chronic respiratory failure requiring tracheostomy..  Initial event was parainfluenza and strep pneumo pneumonia.  Had failed extubation x2 and tracheostomy performed last hospitalization.  Had additional complications of bilateral pneumothoraces.  (Most recently was a hydropneumothorax where fluid grew Candida..  Chest tube was placed and removed during this hospitalization.  Had decent tolerance for pressure support and trach dome but after few hours and trach dome 1227 had a pretty significant episode of desaturation.   Plan:  1.  We will alternate trach dome and pressure support and try to wean.  Pulmonary is consulted.   2.  At least 4 weeks of antimicrobial for Candida detailed in ID section    Cardiovascular system:  PEA arrest prior to his previous admission and 1 episode of PEA associated with desaturation on 1220.  No structural cardiac disease but does have A. Fib which neurologist thought might have been contributory to his embolic strokes.  Anticoagulation been restarted with stable labs although high risk for bleeding seconday to thrombocytopenia.  Also has developed baseline hypotension and is on midodrine 10 mg 3 times daily.  Plan:  Continue Eliquis.  Continue current midodrine dose     CNS:  1. Seizure after hypoxic event.  This is in the context of baseline multifactorial encephalopathy secondary to his ongoing critical illness and prior cardiac  arrests and prior strokes and cirrhosis with hepatic encephalopathy. MRI of head of 12/20/22 reveals no PRES or leptomeningeal enhancement but scattered.  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   Plan:  1.  Keppra and vimpat indefinitely.  Neurology follow-up.  2.  Anticoagulation indefinitely for secondary stroke prevention.  3. Tylenol TID for headache.        Renal/Electrolytes:  1. LORETTA, ?CRI: Now on iHD.  No return of renal function.  - MW schedule      ID:  1. 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 4.  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  Have had some conversations within our group and with transplant ID and general infectious disease.  General consensus is that ganciclovir probably could be discontinued  2. H/o Strep/Parainfluenza infection last hospitalization. Completed treatment course  3. 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  4.  Lactobacillus growing in the broth 4 days after paracentesis.  Cell count was very low.  5.  Candida in left pleural fluid cultures.  Sensitivities not resulted.  6.  Immunosuppressed on tacrolimus.  Discussed with transplant service at the [facility] who felt that given the absence of rejection on his most recent biopsy and ongoing issues with infection continuing with 1 twice daily tacrolimus is the best current option.  They have seen his subtherapeutic trough levels.  Plan:  1.  Confirm with transplant ID whether or not we can stop ganciclovir.  2.  Unasyn for 2 weeks total.  Change to Augmentin through 1/12/2023.  3.  At least 4 weeks of antimicrobial for the Candida.  If sensitive to fluconazole could de-escalate from micafungin.  4.  Continue tacrolimus as above and we are tapering steroids     GI/:  Post transplant cirrhosis.  Main manifestations of this are hepatic encephalopathy and ascites.  Hepatologist at [facility] felt that most likely reason for the cirrhosis was metabolic syndrome or alcohol intake.  There was  no evidence of rejection on biopsy and there was some evidence of regeneration. Paracentesis done on 12/22/2022 with lack of bacillus in the broth indicating SBP low cell count was very low  Plan:  1.  Intermittent paracentesis as needed.  2.  Getting 1 mg  tacrolimus BID and tapering steroids   3. Lactulose and Rifaximin as above  4.  Treat SBP for 2 weeks.  5.  He has an appointment in April with Dr. Simms if he is out of the hospital.     Endocrine:  1. DM.  Titrating insulin. He needs supplemental coverage.  2. He is on steroids which we are weaning  Plan:  Continue with current regimen.      Nutrition:  At goal tube feeds via PEG tube     Heme/Onc:  1. Pancytopenia; possibly due cirrhosis. WBC count has improved . Chronic splenomegaly was present prior to liver transplant but has not regressed.   2. Needs anticoagulation for AFib.   Plan:  1.  Continue Eliquis      ICU prophylaxis:      DVT PPX: Eliquis      Restraints needed: No     Stress ulcer: IV PPI     Central line: Needed for IV access/Meds.     Code Status: Full     Diet: Adult Formula Drip Feeding: Continuous Novasource Renal; Gastrostomy; Goal Rate: 60; mL/hr    DVT Prophylaxis: DOAC  Nixon Catheter: Not present  Central Lines: PRESENT      Cardiac Monitoring: None  Code Status: Full Code      Disposition Plan      Expected Discharge Date: 01/09/2023    Discharge Delays: Complex Discharge    Discharge Comments: Vent. Trach. Phase 1. PEG.  Rectal Tube.      The patient's care was discussed with the Bedside Nurse, Patient and Patient's Family.    Edgar Fritz MD  Hospitalist Service  [facility]  Securely message with the Vocera Web Console (learn more here)  Text page via dotSyntax Paging/Directory         Clinically Significant Risk Factors              # Hypoalbuminemia: Lowest albumin = 1.8 g/dL at 12/29/2022  4:40 AM, will monitor as appropriate             # Severe Malnutrition: based on nutrition assessment         ______________________________________________________________________    Interval History   Patient is sitting in recliner and is in no distress. He has tracheostomy and is on vent.  He is comfortable.  He did not verbalize any concerns or discomfort.  He has some degree of confusion and disorientation. Patient denies any chest pain or shortness of breath or abdominal pain or nausea or vomiting or current headache.  He has watery diarrhea due to lactulose.  Lactulose dosage was decreased to twice daily.  Patient is on trach dome with tube feeding and rectal tube in place.  He is on hemodialysis 3 days a week.  Vital signs are blood glucose are stable.  He is on phase 2 of vent weaning.  His platelet count is down to 63K.  He is on Eliquis and bleeds with trach suction.  Due to history of embolic CVA and PAF we cannot keep him off of Eliquis unless develops significant bleeding/blood loss.    Data reviewed today: I reviewed all medications, new labs and imaging results over the last 24 hours. I personally reviewed no images or EKG's today.    Physical Exam   Vital Signs: Temp: 97.8  F (36.6  C) Temp src: Oral BP: 108/71 Pulse: 97   Resp: 21 SpO2: 97 % O2 Device: (S) Trach dome Oxygen Delivery: 35 LPM  Weight: 209 lbs 0 oz  Patient is sitting in bed.  Is in no acute distress.  Looks pale and chronically ill.  He communicates and is pleasant and cooperative.  He is on ventilator through tracheostomy  HEENT: Nl oral mucosal moisture.  Pale mucosa, no icterus.  Neck: Tracheostomy in place.  Right external jugular CVC.  Heart: Regular rate and rhythm, no murmur  Abdomen: Moderately enlarged due to ascites.  Nontender.  No mass or organomegaly noted.  Patient has rectal tube.  PEG tube in place.  : Deferred  Extremities: No edema.  PICC line in right brachial artery.  Neurological exam: Patient is alert and oriented x2.  Has normal behavior.  Gross cranial nerve neurological exam did not show any major abnormality.   Left  is weaker than the right otherwise no focal neurological deficit noted in limited neurological exam. Gait and station were not examined.    Data   Recent Labs   Lab 01/03/23  0934 01/03/23  0642 01/02/23  1942 01/02/23  0813 01/02/23  0527 01/01/23  0759 01/01/23  0626 12/31/22  0826 12/31/22  0610   NA  --   --   --   --  134*  --  142  --  137   POTASSIUM  --   --   --   --  3.7  --  3.7  --  3.7   CHLORIDE  --   --   --   --  95*  --  101  --  99   CO2  --   --   --   --  29  --  30*  --  29   BUN  --   --   --   --  74.2*  --  61.1*  --  44.1*   CR  --   --   --   --  3.06*  --  2.72*  --  2.23*   ANIONGAP  --   --   --   --  10  --  11  --  9   KELLY  --   --   --   --  8.5*  --  8.6  --  8.0*   * 178* 121*   < > 154*   < > 162*   < > 150*   ALBUMIN  --   --   --   --  2.2*  --  2.4*  --  2.2*    < > = values in this interval not displayed.     Medications     dextrose       - MEDICATION INSTRUCTIONS -         sodium chloride 0.9%  300 mL Hemodialysis Machine During Dialysis/CRRT (from stock)     acetaminophen  975 mg Per Feeding Tube Q8H BIJU    Or     acetaminophen  650 mg Rectal Q8H BIJU     acetylcysteine  2 mL Nebulization BID     amoxicillin-clavulanate  500 mg Per Feeding Tube QPM     amoxicillin-clavulanate  500 mg Per Feeding Tube Once per day on Mon Wed Fri     apixaban ANTICOAGULANT  5 mg Oral or Feeding Tube BID     B and C vitamin Complex with folic acid  5 mL Per Feeding Tube Daily     carboxymethylcellulose PF  1 drop Both Eyes TID     chlorhexidine  15 mL Mouth/Throat BID     epoetin mirta-epbx  10,000 Units Subcutaneous Once per day on Mon Wed Fri     folic acid  1 mg Oral or Feeding Tube Daily     heparin Lock (1000 units/mL High concentration)  1,900 Units Intracatheter During Dialysis/CRRT (from stock)     heparin Lock (1000 units/mL High concentration)  1,900 Units Intracatheter During Dialysis/CRRT (from stock)     insulin aspart  1-6 Units Subcutaneous Daily     insulin  glargine  20 Units Subcutaneous Daily     ipratropium - albuterol 0.5 mg/2.5 mg/3 mL  3 mL Nebulization 4x daily     lacosamide  100 mg Oral or Feeding Tube BID     lactulose  20 g Oral or Feeding Tube BID     levETIRAcetam  1,000 mg Oral or Feeding Tube Q24H     Melatonin  6 mg Oral or NG Tube At Bedtime     micafungin  100 mg Intravenous Q24H     midodrine  10 mg Oral or Feeding Tube TID w/meals     mineral oil-hydrophilic petrolatum   Topical Daily     nystatin  500,000 Units Swish & Swallow 4x Daily     pantoprazole  40 mg Per Feeding Tube BID     QUEtiapine  50 mg Oral or Feeding Tube At Bedtime     rifaximin  550 mg Per Feeding Tube BID     sodium chloride (PF)  10-30 mL Intracatheter Q8H     sodium chloride (PF)  9 mL Intracatheter During Dialysis/CRRT (from stock)     sodium chloride (PF)  9 mL Intracatheter During Dialysis/CRRT (from stock)     tacrolimus  1 mg Oral or Feeding Tube BID

## 2023-01-04 ENCOUNTER — APPOINTMENT (OUTPATIENT)
Dept: SPEECH THERAPY | Facility: CLINIC | Age: 58
DRG: 207 | End: 2023-01-04
Attending: HOSPITALIST
Payer: COMMERCIAL

## 2023-01-04 ENCOUNTER — ANCILLARY PROCEDURE (OUTPATIENT)
Dept: GENERAL RADIOLOGY | Facility: CLINIC | Age: 58
DRG: 207 | End: 2023-01-04
Attending: NURSE PRACTITIONER
Payer: COMMERCIAL

## 2023-01-04 ENCOUNTER — APPOINTMENT (OUTPATIENT)
Dept: OCCUPATIONAL THERAPY | Facility: CLINIC | Age: 58
DRG: 207 | End: 2023-01-04
Attending: HOSPITALIST
Payer: COMMERCIAL

## 2023-01-04 LAB
ABO/RH(D): NORMAL
ANTIBODY SCREEN: NEGATIVE
BLD PROD TYP BPU: NORMAL
BLOOD COMPONENT TYPE: NORMAL
CODING SYSTEM: NORMAL
CROSSMATCH: NORMAL
GLUCOSE BLDC GLUCOMTR-MCNC: 129 MG/DL (ref 70–99)
GLUCOSE BLDC GLUCOMTR-MCNC: 166 MG/DL (ref 70–99)
HGB BLD-MCNC: 6.3 G/DL (ref 13.3–17.7)
ISSUE DATE AND TIME: NORMAL
PHOSPHATE SERPL-MCNC: 3.6 MG/DL (ref 2.5–4.5)
PLATELET # BLD AUTO: 58 10E3/UL (ref 150–450)
SPECIMEN EXPIRATION DATE: NORMAL
UNIT ABO/RH: NORMAL
UNIT NUMBER: NORMAL
UNIT STATUS: NORMAL
UNIT TYPE ISBT: 5100

## 2023-01-04 PROCEDURE — 94640 AIRWAY INHALATION TREATMENT: CPT | Mod: 76

## 2023-01-04 PROCEDURE — 85018 HEMOGLOBIN: CPT | Performed by: NURSE PRACTITIONER

## 2023-01-04 PROCEDURE — 84100 ASSAY OF PHOSPHORUS: CPT | Performed by: HOSPITALIST

## 2023-01-04 PROCEDURE — 999N000253 HC STATISTIC WEANING TRIALS

## 2023-01-04 PROCEDURE — 258N000003 HC RX IP 258 OP 636: Performed by: NURSE PRACTITIONER

## 2023-01-04 PROCEDURE — 250N000012 HC RX MED GY IP 250 OP 636 PS 637: Performed by: HOSPITALIST

## 2023-01-04 PROCEDURE — 999N000009 HC STATISTIC AIRWAY CARE

## 2023-01-04 PROCEDURE — 86901 BLOOD TYPING SEROLOGIC RH(D): CPT | Performed by: STUDENT IN AN ORGANIZED HEALTH CARE EDUCATION/TRAINING PROGRAM

## 2023-01-04 PROCEDURE — 86850 RBC ANTIBODY SCREEN: CPT | Performed by: STUDENT IN AN ORGANIZED HEALTH CARE EDUCATION/TRAINING PROGRAM

## 2023-01-04 PROCEDURE — 272N000735 HC KIT, CIRCUIT WITH NEB, VOLERA

## 2023-01-04 PROCEDURE — 250N000013 HC RX MED GY IP 250 OP 250 PS 637: Performed by: NURSE PRACTITIONER

## 2023-01-04 PROCEDURE — P9016 RBC LEUKOCYTES REDUCED: HCPCS | Performed by: STUDENT IN AN ORGANIZED HEALTH CARE EDUCATION/TRAINING PROGRAM

## 2023-01-04 PROCEDURE — 250N000013 HC RX MED GY IP 250 OP 250 PS 637: Performed by: HOSPITALIST

## 2023-01-04 PROCEDURE — 250N000009 HC RX 250: Performed by: HOSPITALIST

## 2023-01-04 PROCEDURE — 250N000009 HC RX 250: Performed by: NURSE PRACTITIONER

## 2023-01-04 PROCEDURE — 94003 VENT MGMT INPAT SUBQ DAY: CPT

## 2023-01-04 PROCEDURE — 272N000270 HC CIRCUIT, METANEB

## 2023-01-04 PROCEDURE — 71045 X-RAY EXAM CHEST 1 VIEW: CPT

## 2023-01-04 PROCEDURE — 92507 TX SP LANG VOICE COMM INDIV: CPT | Mod: GN

## 2023-01-04 PROCEDURE — 86923 COMPATIBILITY TEST ELECTRIC: CPT | Performed by: STUDENT IN AN ORGANIZED HEALTH CARE EDUCATION/TRAINING PROGRAM

## 2023-01-04 PROCEDURE — 634N000001 HC RX 634: Performed by: PHYSICIAN ASSISTANT

## 2023-01-04 PROCEDURE — 999N000123 HC STATISTIC OXYGEN O2DAILY TECH TIME

## 2023-01-04 PROCEDURE — 97110 THERAPEUTIC EXERCISES: CPT | Mod: GO | Performed by: OCCUPATIONAL THERAPIST

## 2023-01-04 PROCEDURE — 250N000011 HC RX IP 250 OP 636: Performed by: NURSE PRACTITIONER

## 2023-01-04 PROCEDURE — 99232 SBSQ HOSP IP/OBS MODERATE 35: CPT | Performed by: NURSE PRACTITIONER

## 2023-01-04 PROCEDURE — 90935 HEMODIALYSIS ONE EVALUATION: CPT

## 2023-01-04 PROCEDURE — 85049 AUTOMATED PLATELET COUNT: CPT | Performed by: NURSE PRACTITIONER

## 2023-01-04 PROCEDURE — 258N000003 HC RX IP 258 OP 636: Performed by: PHYSICIAN ASSISTANT

## 2023-01-04 PROCEDURE — 99233 SBSQ HOSP IP/OBS HIGH 50: CPT | Performed by: HOSPITALIST

## 2023-01-04 PROCEDURE — 94640 AIRWAY INHALATION TREATMENT: CPT

## 2023-01-04 PROCEDURE — 120N000017 HC R&B RESPIRATORY CARE

## 2023-01-04 PROCEDURE — 999N000157 HC STATISTIC RCP TIME EA 10 MIN

## 2023-01-04 PROCEDURE — 86923 COMPATIBILITY TEST ELECTRIC: CPT | Performed by: HOSPITALIST

## 2023-01-04 PROCEDURE — 94799 UNLISTED PULMONARY SVC/PX: CPT

## 2023-01-04 PROCEDURE — 94003 VENT MGMT INPAT SUBQ DAY: CPT | Performed by: NURSE PRACTITIONER

## 2023-01-04 RX ORDER — MIDODRINE HYDROCHLORIDE 5 MG/1
5 TABLET ORAL
Status: DISCONTINUED | OUTPATIENT
Start: 2023-01-04 | End: 2023-01-21 | Stop reason: HOSPADM

## 2023-01-04 RX ORDER — MIDODRINE HYDROCHLORIDE 5 MG/1
5 TABLET ORAL
Status: DISCONTINUED | OUTPATIENT
Start: 2023-01-04 | End: 2023-01-06

## 2023-01-04 RX ORDER — MIDODRINE HYDROCHLORIDE 5 MG/1
5 TABLET ORAL
Status: DISCONTINUED | OUTPATIENT
Start: 2023-01-04 | End: 2023-01-04

## 2023-01-04 RX ADMIN — Medication 40 MG: at 21:41

## 2023-01-04 RX ADMIN — CHLORHEXIDINE GLUCONATE 0.12% ORAL RINSE 15 ML: 1.2 LIQUID ORAL at 21:41

## 2023-01-04 RX ADMIN — APIXABAN 5 MG: 2.5 TABLET, FILM COATED ORAL at 08:55

## 2023-01-04 RX ADMIN — HEPARIN SODIUM 1900 UNITS: 1000 INJECTION INTRAVENOUS; SUBCUTANEOUS at 19:51

## 2023-01-04 RX ADMIN — EPOETIN ALFA-EPBX 10000 UNITS: 10000 INJECTION, SOLUTION INTRAVENOUS; SUBCUTANEOUS at 18:23

## 2023-01-04 RX ADMIN — Medication 5 ML: at 08:56

## 2023-01-04 RX ADMIN — TACROLIMUS 1 MG: 5 CAPSULE ORAL at 08:56

## 2023-01-04 RX ADMIN — RIFAXIMIN 550 MG: 550 TABLET ORAL at 21:42

## 2023-01-04 RX ADMIN — INSULIN GLARGINE 20 UNITS: 100 INJECTION, SOLUTION SUBCUTANEOUS at 08:56

## 2023-01-04 RX ADMIN — RIFAXIMIN 550 MG: 550 TABLET ORAL at 08:55

## 2023-01-04 RX ADMIN — ALBUTEROL SULFATE 2.5 MG: 2.5 SOLUTION RESPIRATORY (INHALATION) at 15:00

## 2023-01-04 RX ADMIN — NYSTATIN 500000 UNITS: 100000 SUSPENSION ORAL at 08:56

## 2023-01-04 RX ADMIN — Medication 40 MG: at 09:01

## 2023-01-04 RX ADMIN — ALBUTEROL SULFATE 2.5 MG: 2.5 SOLUTION RESPIRATORY (INHALATION) at 11:08

## 2023-01-04 RX ADMIN — AMOXICILLIN AND CLAVULANATE POTASSIUM 500 MG: 400; 57 POWDER, FOR SUSPENSION ORAL at 09:01

## 2023-01-04 RX ADMIN — TACROLIMUS 1 MG: 5 CAPSULE ORAL at 17:31

## 2023-01-04 RX ADMIN — SODIUM CHLORIDE 500 ML: 9 INJECTION, SOLUTION INTRAVENOUS at 17:20

## 2023-01-04 RX ADMIN — ACETAMINOPHEN 975 MG: 325 TABLET, FILM COATED ORAL at 13:18

## 2023-01-04 RX ADMIN — AMOXICILLIN AND CLAVULANATE POTASSIUM 500 MG: 400; 57 POWDER, FOR SUSPENSION ORAL at 21:43

## 2023-01-04 RX ADMIN — FOLIC ACID 1 MG: 1 TABLET ORAL at 08:56

## 2023-01-04 RX ADMIN — ACETAMINOPHEN 975 MG: 325 TABLET, FILM COATED ORAL at 21:42

## 2023-01-04 RX ADMIN — CHLORHEXIDINE GLUCONATE 0.12% ORAL RINSE 15 ML: 1.2 LIQUID ORAL at 08:56

## 2023-01-04 RX ADMIN — ACETYLCYSTEINE 2 ML: 200 SOLUTION ORAL; RESPIRATORY (INHALATION) at 15:01

## 2023-01-04 RX ADMIN — MIDODRINE HYDROCHLORIDE 5 MG: 5 TABLET ORAL at 17:31

## 2023-01-04 RX ADMIN — ALBUTEROL SULFATE 2.5 MG: 2.5 SOLUTION RESPIRATORY (INHALATION) at 20:40

## 2023-01-04 RX ADMIN — ACETYLCYSTEINE 2 ML: 200 SOLUTION ORAL; RESPIRATORY (INHALATION) at 07:40

## 2023-01-04 RX ADMIN — ACETAMINOPHEN 650 MG: 325 TABLET, FILM COATED ORAL at 18:23

## 2023-01-04 RX ADMIN — FLUCONAZOLE 200 MG: 200 TABLET ORAL at 21:42

## 2023-01-04 RX ADMIN — Medication 1 DROP: at 21:42

## 2023-01-04 RX ADMIN — ACETYLCYSTEINE 2 ML: 200 SOLUTION ORAL; RESPIRATORY (INHALATION) at 20:40

## 2023-01-04 RX ADMIN — Medication 6 MG: at 21:43

## 2023-01-04 RX ADMIN — LACTULOSE 20 G: 20 SOLUTION ORAL at 08:56

## 2023-01-04 RX ADMIN — ALBUTEROL SULFATE 2.5 MG: 2.5 SOLUTION RESPIRATORY (INHALATION) at 07:40

## 2023-01-04 RX ADMIN — WHITE PETROLATUM: 1.75 OINTMENT TOPICAL at 08:59

## 2023-01-04 RX ADMIN — LACOSAMIDE 100 MG: 10 SOLUTION ORAL at 08:56

## 2023-01-04 RX ADMIN — Medication 1 DROP: at 08:55

## 2023-01-04 RX ADMIN — NYSTATIN 500000 UNITS: 100000 SUSPENSION ORAL at 13:18

## 2023-01-04 RX ADMIN — NYSTATIN 500000 UNITS: 100000 SUSPENSION ORAL at 17:31

## 2023-01-04 RX ADMIN — LACOSAMIDE 100 MG: 10 SOLUTION ORAL at 21:44

## 2023-01-04 RX ADMIN — LEVETIRACETAM 1000 MG: 100 SOLUTION ORAL at 21:41

## 2023-01-04 RX ADMIN — SODIUM CHLORIDE 100 ML: 9 INJECTION, SOLUTION INTRAVENOUS at 18:45

## 2023-01-04 RX ADMIN — LACTULOSE 20 G: 20 SOLUTION ORAL at 21:42

## 2023-01-04 RX ADMIN — ACETYLCYSTEINE 2 ML: 200 SOLUTION ORAL; RESPIRATORY (INHALATION) at 11:08

## 2023-01-04 RX ADMIN — Medication 1 DROP: at 13:20

## 2023-01-04 RX ADMIN — NYSTATIN 500000 UNITS: 100000 SUSPENSION ORAL at 21:41

## 2023-01-04 RX ADMIN — QUETIAPINE FUMARATE 50 MG: 50 TABLET ORAL at 21:42

## 2023-01-04 RX ADMIN — ACETAMINOPHEN 975 MG: 325 TABLET, FILM COATED ORAL at 06:49

## 2023-01-04 ASSESSMENT — ACTIVITIES OF DAILY LIVING (ADL)
ADLS_ACUITY_SCORE: 58
ADLS_ACUITY_SCORE: 62
ADLS_ACUITY_SCORE: 58
ADLS_ACUITY_SCORE: 62
ADLS_ACUITY_SCORE: 58
ADLS_ACUITY_SCORE: 62
ADLS_ACUITY_SCORE: 58

## 2023-01-04 NOTE — PLAN OF CARE
Problem: Mechanical Ventilation Invasive  Goal: Effective Communication  Outcome: Progressing  Goal: Optimal Device Function  Outcome: Progressing  Intervention: Optimize Device Care and Function  Recent Flowsheet Documentation  Taken 1/3/2023 1610 by Maria Antonia Horvath RT  Airway Safety Measures: all equipment/monitors on and audible  Taken 1/3/2023 1221 by Maria Antonia Horvath RT  Airway Safety Measures: all equipment/monitors on and audible  Goal: Mechanical Ventilation Liberation  Outcome: Progressing  Goal: Optimal Nutrition Delivery  Outcome: Progressing  Goal: Absence of Device-Related Skin and Tissue Injury  Outcome: Progressing  Goal: Absence of Ventilator-Induced Lung Injury  Outcome: Progressing   RT PROGRESS NOTE   [id number]  DATA:     CURRENT SETTINGS:             TRACH TYPE / SIZE: #6 Shiley XLT Proximal, placed on 11/29/22               MODE:   AC 12 450 +5 24%             FIO2:        ACTION:             THERAPIES: Albuterol/ Mucomyst QID                SUCTION: X8 for small to mod yellow to pink to brown plugs. Sx pressure adjusted.                          FREQUENCY:                           AMOUNT:                           CONSISTENCY:                           COLOR:                SPONTANEOUS COUGH EFFORT/STRENGTH OF EFFORT (not elicited by suctioning):                               WEANING PHASE: 2                          WEAN MODE: TM 35% 35L started at 1005 a.m., titrated to 30% 30L  No PMV trial today  Due to intermittent bloody secr and some bllood at trach site.                        WEAN TIME:                           END WEAN REASON:        RESPONSE:             BS:                VITAL SIGNS:                EMOTIONAL NEEDS / CONCERNS:                  RISK FOR SELF DECANNULATION:                          RISK DUE TO:                          INTERVENTION/S IN PLACE IS/ARE:         NOTE / PLAN:   Goal Outcome Evaluation:     Cont to monitor and treat

## 2023-01-04 NOTE — PLAN OF CARE
Problem: Pain Acute  Goal: Optimal Pain Control and Function  Outcome: Progressing  Intervention: Prevent or Manage Pain  Recent Flowsheet Documentation  Taken 1/4/2023 0500 by Jesse Sheriff Jr RN  Medication Review/Management: medications reviewed     Problem: Enteral Nutrition  Goal: Absence of Aspiration Signs and Symptoms  Outcome: Progressing  Intervention: Minimize Aspiration Risk  Recent Flowsheet Documentation  Taken 1/3/2023 2244 by Jesse Sheriff Jr RN  Head of Bed (HOB) Positioning: HOB at 30 degrees     Problem: Mechanical Ventilation Invasive  Goal: Effective Communication  Outcome: Progressing  Intervention: Ensure Effective Communication  Recent Flowsheet Documentation  Taken 1/4/2023 0500 by Jesse Sheriff Jr RN  Trust Relationship/Rapport: care explained    Patient is alert and oriented, able to inform staffs his needs and wants, able to verbalized his pain and location, slept comfortably and tolerates his feeding well.

## 2023-01-04 NOTE — PLAN OF CARE
Plan of Care Reviewed With: patient    Overall Patient Progress: no changeOverall Patient Progress: no change       Problem: Mechanical Ventilation Invasive  Goal: Optimal Device Function  Intervention: Optimize Device Care and Function  Recent Flowsheet Documentation  Taken 1/4/2023 0830 by Radha Sanchez, RN  Airway Safety Measures: all equipment/monitors on and audible       Problem: Enteral Nutrition  Goal: Absence of Aspiration Signs and Symptoms  Outcome: Progressing     Patient is alert and oriented. C/o abdominal pain 5/10 and Tylenol administered with some effect.  Current on trach dome. Up w/c x1 this shift. Rectal tube intact with some leaking noted. Tube feeding continuous per orders. Hbg 6.3 and 1 Unit RBC transfused this evening started 1522  and completed 1730 .  Family visited this shift. Frequent suctioning this shift. Vitals stable. Patient is on dialysis run at this time.

## 2023-01-04 NOTE — PROGRESS NOTES
Pulmonary Progress Note    Admit Date: 12/29/2022  CODE: Full Code    Assessment/Plan:   57yoM with liver transplant(2016), recent prolonged hospitalization 11/12-12/15 for PEA cardiac arrest, Strep/Parainfluenza pneumonia with ARDS, MODS, right PTX requiring chest tube since removed.  Now HD dependent, s/p trach/PEG.  Discharged to LTAC, sent out the next day with new left PTX & GIB.  Recent hospitalization c/b GIB(resolved), PEA arrest and seizure on 12/20.  Left CT removed on 12/16.  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  Transferred back to LTAC 12/29.     Discussion of pertinent problems:  Acute hypoxic/hypercapnic respiratory failure s/p trach: multifactorial related to pneumonia(treated), ?aspergillus pna s/p 1 week of voriconazole, ARDS(resolved), b/l pneumothoraces(chest tubes removed), b/l pleural effusions & atelectasis(stable), pleural fluid cx growing Candida.  Progressing slowly with trach dome trials and AC at night.  CxR today reviewed and no significant changes from previous, no recurrent PTX    Tracheostomy in place: 8 Shiley placed 11/29.  Changed to 6 Shiley prox xlt 12/8, unclear why     Dysphagia s/p PEG tube.  BDT 12/30 with no immediate but moderate delayed aspiration 45min later    Candida in left pleural fluid cultures: 4 weeks of antimicrobial currently on Micafungin.  Sensitive to fluconazole, page out to ID 1/2 for guidance on dosing, no callback.  400mg dose after dialysis lower doses on 200 non dialysis days.      LORETTA on MWF HD: followed by Nephrology     S/p Liver transplant with post transplant cirrhosis & ascites s/p paracentesis 12/22: on [place] and prednisone    CHRISTIAN on home BiPAP     lactobacillus peritonitis on Unasyn transitioned to PO Augmentin.  Duration per ID    Recent seizure after hypoxic event on 12/20 with subsequent seizure 12/21 during  dialysis: Keppra and vimpat indefinitely     Multifocal acute ischemic strokes on MRI(12/20).  AC was on hold d/t GIB and left hemothorax, now resumed    Hx b/l pneumothoraces: right CT first placed 11/16, removed, then replaced by IR on 12/12, removed again 12/19.  Left chest tube placed 12/16, ?hemithorax, CT removed on 12/19    Hx mucus plugging of bronchi.  Bronch on 12/20 with minimal secretions.  Hypoxia induced mucus plug suctioned this morning     Plan:   - Phase 2 weans: TM/cuff up during the day with AC night.    - PMV trials with SLP  - Will consider trach downsize to 7 Bivona this week - hold today given mucus plug and low Hgb.    - If continues to have bloody secretions with need to consider holding AC - stable today   - Bronchial hygiene with Albuterol + mucomyst nebs QID.  Add back metanebs today given no PTX on CxR and mucus plugs being suctioned   - Fluid removal with dialysis - to keep on dry side  - Fluconazole for candida isolation in left pleural cultures - total ~4week course based on imaging     Henry Fenton CNP  Pulmonary Medicine  [facility]  Pager [phone]    Subjective/Interim Events:   - hypoxia and orthopnea overnight on trach dome, improved when placed back on vent  - denies acute dyspnea, pain, n/v, fever, chills      Tracheal secretions:  Overnight - x6, sm/lg, thick with some blood clots   Yesterday - x5, sm/lg, thick     Cough strength: not witnessed    Current phase of ventilator weaning pathway:  Phase 2    Ventilator weaning results   12/29: PS 12/5 for 9hr  12/30-1/1: TM 3hr x2, otherwise PS 8/5 day, AC night   1/2: TM/cuff up for 13.5hr.  AC night   1/3: 35L/35%TM for 13hr    Clinical status discussed today with respiratory therapist, patient, wife, sister     Medications:       dextrose       - MEDICATION INSTRUCTIONS -         sodium chloride 0.9%  300 mL Hemodialysis Machine During Dialysis/CRRT (from stock)     acetaminophen  975 mg Per Feeding Tube Q8H BIJU    Or      acetaminophen  650 mg Rectal Q8H BIJU     acetylcysteine  2 mL Nebulization 4x daily     albuterol  2.5 mg Nebulization 4x daily     amoxicillin-clavulanate  500 mg Per Feeding Tube QPM     amoxicillin-clavulanate  500 mg Per Feeding Tube Once per day on Mon Wed Fri     apixaban ANTICOAGULANT  5 mg Oral or Feeding Tube BID     B and C vitamin Complex with folic acid  5 mL Per Feeding Tube Daily     carboxymethylcellulose PF  1 drop Both Eyes TID     chlorhexidine  15 mL Mouth/Throat BID     epoetin mirta-epbx  10,000 Units Subcutaneous Once per day on Mon Wed Fri     fluconazole  200 mg Per Feeding Tube At Bedtime     folic acid  1 mg Oral or Feeding Tube Daily     heparin Lock (1000 units/mL High concentration)  1,900 Units Intracatheter During Dialysis/CRRT (from stock)     heparin Lock (1000 units/mL High concentration)  1,900 Units Intracatheter During Dialysis/CRRT (from stock)     insulin aspart  1-6 Units Subcutaneous Daily     insulin glargine  20 Units Subcutaneous Daily     lacosamide  100 mg Oral or Feeding Tube BID     lactulose  20 g Oral or Feeding Tube BID     levETIRAcetam  1,000 mg Oral or Feeding Tube Q24H     Melatonin  6 mg Oral or NG Tube At Bedtime     midodrine  5 mg Oral or Feeding Tube TID w/meals     midodrine  5 mg Oral During Dialysis/CRRT (from stock)     mineral oil-hydrophilic petrolatum   Topical Daily     nystatin  500,000 Units Swish & Swallow 4x Daily     pantoprazole  40 mg Per Feeding Tube BID     QUEtiapine  50 mg Oral or Feeding Tube At Bedtime     rifaximin  550 mg Per Feeding Tube BID     sodium chloride (PF)  10-30 mL Intracatheter Q8H     sodium chloride (PF)  9 mL Intracatheter During Dialysis/CRRT (from stock)     sodium chloride (PF)  9 mL Intracatheter During Dialysis/CRRT (from stock)     tacrolimus  1 mg Oral or Feeding Tube BID         Exam/Data:   Vitals  /84 (BP Location: Left arm, Patient Position: Semi-Salgado's, Cuff Size: Adult Regular)   Pulse 102    Temp 97.6  F (36.4  C) (Oral)   Resp 22   Wt 93.8 kg (206 lb 12.8 oz)   SpO2 100%   BMI 28.05 kg/m       I/O last 3 completed shifts:  In: 1671 [I.V.:250; NG/GT:1421]  Out: -   Weight change: -0.998 kg (-2 lb 3.2 oz)    Vent Mode: Trach collar  FiO2 (%): 35 %  Resp Rate (Set): 12 breaths/min  Tidal Volume (Set, mL): 450 mL  PEEP (cm H2O): 5 cmH2O  Resp: 22      EXAM:  Gen: no distress in chair on trach dome   HEENT: NT, trach midline/intact, significant peristomal ecchymosis, cuff up   CV: RRR  Resp: CTAB; non-labored   Abd: large, soft, mildly tender, BS hypoactive  Skin: no visible rashes or lesions, scattered ecchymosis, fragile   Ext: mod right pedal edema, weak  Neuro: alert, follows commands, mouths words     ROS:  A 10-system review was obtained and is negative with the exception of the symptoms noted above.    Labs:  Basic Metabolic Panel  Recent Labs   Lab 01/04/23  0733 01/04/23  0629 01/03/23  0934 01/03/23  0642 01/02/23  0813 01/02/23  0527 01/01/23  0759 01/01/23  0626 12/31/22  0826 12/31/22  0610 12/30/22  1628 12/30/22  1454 12/30/22  0817 12/30/22  0651   NA  --   --   --   --   --  134*  --  142  --  137  --   --   --  142   POTASSIUM  --   --   --   --   --  3.7  --  3.7  --  3.7  --  4.1  --  3.4   CHLORIDE  --   --   --   --   --  95*  --  101  --  99  --   --   --  102   CO2  --   --   --   --   --  29  --  30*  --  29  --   --   --  30*   BUN  --   --   --   --   --  74.2*  --  61.1*  --  44.1*  --   --   --  61.0*   CR  --   --   --   --   --  3.06*  --  2.72*  --  2.23*  --   --   --  3.02*   * 129* 143* 178*   < > 154*   < > 162*   < > 150*   < >  --    < > 134*    < > = values in this interval not displayed.     Liver Function Tests  Recent Labs   Lab 01/02/23  0527 01/01/23  0626 12/31/22  0610 12/30/22  0651   ALBUMIN 2.2* 2.4* 2.2* 2.2*     CBC RESULTS: Recent Labs   Lab Test 01/04/23  0623 12/27/22  0420   WBC  --  7.5   RBC  --  2.76*   HGB 6.3* 8.8*   HCT  --  29.4*   MCV   --  107*   MCH  --  31.9   MCHC  --  29.9*   RDW  --  18.7*   PLT 58* 63*         RADIOLOGY: Personally reviewed; radiology read below   XR CHEST PORT 1 VIEW  LOCATION: [facility]  DATE/TIME: 1/4/2023 9:21 AM     INDICATION: hypoxic resp failure; intermittent bloody trach secretions; orthopena; hx b l pleural effusions and b l PTX  COMPARISON: Portable AP view the chest 12/27/2022; CT pulmonary angiogram 12/20/2022                                                                      IMPRESSION:      Right PICC terminates in the middle third of the SVC. Tunneled right jugular approach dialysis catheter terminates in the right atrium. Tracheostomy resides within the tracheal air column.     Persistent shallow lung inflation. Territories of bibasilar atelectasis are similar. Small right pleural effusion visible in the right lateral sulcus and layering into the interlobar fissures is similar. No enlarging pleural effusion. No pneumothorax.     Unchanged heart and mediastinal contours. Unchanged oblique interface just lateral to the dialysis catheter/SVC and corresponds to rounded atelectasis on prior CT.  --------------  XR CHEST 12/27/2022     HISTORY: Shortness of breath, mucous plug?     COMPARISON: Chest radiograph 12/24/2022                                                                      IMPRESSION: Stable cardiomediastinal silhouette with unchanged  position of right upper extremity PICC and dialysis catheter.  Tracheostomy tube tip overlies the midthoracic trachea. Persistent  bilateral pleural effusions with associated bibasilar atelectasis. No  new airspace consolidation or lobar atelectasis. No pneumothorax.  Bones are unchanged. Percutaneous gastrostomy tube partially  visualized.

## 2023-01-04 NOTE — PLAN OF CARE
Problem: Mechanical Ventilation Invasive  Goal: Optimal Device Function  Outcome: Progressing  Goal: Mechanical Ventilation Liberation  Outcome: Progressing  Goal: Absence of Device-Related Skin and Tissue Injury  Outcome: Progressing  Goal: Absence of Ventilator-Induced Lung Injury  Outcome: Progressing   RT PROGRESS NOTE     DATA:     CURRENT SETTINGS: AC 12, 450, +5             TRACH TYPE / SIZE:  6 Shiley  XLT  PROX 11/29/2022             MODE: AC             FIO2:  24%     ACTION:             THERAPIES: Duo/Muco/[place] QID              SUCTION:                           FREQUENCY: X8                        AMOUNT: Small/moderate/copious                           CONSISTENCY: thick                        COLOR: Pale yellow/clear/red-streaked/pink/red              SPONTANEOUS COUGH EFFORT/STRENGTH OF EFFORT (not elicited by suctioning):Strong                              WEANING PHASE:  2                         WEAN MODE: 35%/35L TM                        WEAN TIME:  Since 0948                        END WEAN REASON: Still weaning     RESPONSE:             BS:  Coarse             VITAL SIGNS:Sat %, HR , RR 20-24             EMOTIONAL NEEDS / CONCERNS:  no              RISK FOR SELF DECANNULATION:                          RISK DUE TO:                          INTERVENTION/S IN PLACE IS/ARE:      NOTE/PLAN: Cont with plan and monitor closely. Pt had a mucus plus at the start of the shift. He was Ambu bag and RRT was called. Pt is currently stable and weaning on the TM.

## 2023-01-04 NOTE — PROGRESS NOTES
RENAL PROGRESS NOTE    ASSESSMENT & PLAN:   Anuric LORETTA requiring dialysis: After PEA arrest, was on CRRT which was discontinued 11/26/2022 and now started on intermittent hemodialysis ongoing since 11/29/2022.  He is maintained on a MWF schedule.  No signs of renal recovery yet.    -Attempted diuretic challenge and bladder scans->remains anuric and torsemide was discontinued 1/2  -Aim to keep net negative.  Target UF 2-3kg as BP allows  -Midodrine to support blood pressure and UF.   -MWF HD at [facility]     Anemia of chronic renal failure:   Hematochezia   Left hemothorax.    Erythropoietin 10,000 units 3 times weekly on dialysis days->continue  Hgb down to 6.3->1 unit PRBCs planned.     Chronic hypotension: Likely due to chronic liver disease, improving.  wean midodrine to 5mg TID with parameters to hold for SBP>110.      Hypokalemia: Needing intermittent replacement.  He is higher K bath with HD.     Acute on chronic hypoxic respiratory failure: S/p tracheostomy 11/29/2022.  Complicated by left hemopneumothorax s/p left chest tube.  History of aspergillus PNA and multiple bacterial PNA. On micafungin.  Mgmt per pulm      CARRILLO cirrhosis s/p liver transplant: Immunosuppression per GI.  Tacrolimus and prednisone.  Paracentesis as indicated per GI.     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  Initial seizure after PEA arrest, then another seizure again 12/21/2022 during dialysis session.  Initially on acyclovir and then switched over to ganciclovir, since been discontinued.  On Keppra and Vimpat.  Management per neurology and ID    SUBJECTIVE: Episode of SOB and mucus plugging early this morning.  Breathing ok now.  No complaints at this time.  About to work with OT.  Reviewed plan for dialysis with UF today.  Also spoke with dialysis RN about UF today.    OBJECTIVE:  Physical Exam   Temp: 97.6  F (36.4  C) Temp src: Oral BP: 125/84 Pulse: 98   Resp: 20 SpO2: 98 % O2 Device: Mechanical  Ventilator Oxygen Delivery: 35 LPM  Vitals:    01/02/23 0521 01/03/23 0420 01/04/23 0649   Weight: 95 kg (209 lb 8 oz) 94.8 kg (209 lb) 93.8 kg (206 lb 12.8 oz)     Vital Signs with Ranges  Temp:  [97.6  F (36.4  C)-98.4  F (36.9  C)] 97.6  F (36.4  C)  Pulse:  [] 98  Resp:  [20-24] 20  BP: ()/(69-88) 125/84  FiO2 (%):  [24 %-35 %] 24 %  SpO2:  [97 %-100 %] 98 %  I/O last 3 completed shifts:  In: 1671 [I.V.:250; NG/GT:1421]  Out: -         Patient Vitals for the past 72 hrs:   Weight   01/04/23 0649 93.8 kg (206 lb 12.8 oz)   01/03/23 0420 94.8 kg (209 lb)   01/02/23 0521 95 kg (209 lb 8 oz)       PHYSICAL EXAM:  General: Alert, NAD, answers questions with nodding/mouthing words  HENT: NT, trach  Cardiovascular: RRR, No peripheral edema. Pedal pulses palpable bilaterally.  Respiratory: mildly coarse ant (R>L), trach dome  Gastrointestinal:  distended with ascites (Increasing).   Integumentary: Warm, dry.  Petechiae and scattered bruising.   Neurologic: Non focal   Psychiatric: Cooperative  : No Nixon  Access: Right tunneled CVC.        LABORATORY STUDIES:     Recent Labs   Lab 01/04/23  0623   HGB 6.3*   PLT 58*       Basic Metabolic Panel:  Recent Labs   Lab 01/04/23  0733 01/04/23  0629 01/03/23  0934 01/03/23  0642 01/02/23  1942 01/02/23  1606 01/02/23  0813 01/02/23  0527 01/01/23  0759 01/01/23  0626 12/31/22  0826 12/31/22  0610 12/30/22  1628 12/30/22  1454 12/30/22  0817 12/30/22  0651 12/29/22  0549 12/29/22  0440   NA  --   --   --   --   --   --   --  134*  --  142  --  137  --   --   --  142  --  137   POTASSIUM  --   --   --   --   --   --   --  3.7  --  3.7  --  3.7  --  4.1  --  3.4  --  3.4   CHLORIDE  --   --   --   --   --   --   --  95*  --  101  --  99  --   --   --  102  --  103   CO2  --   --   --   --   --   --   --  29  --  30*  --  29  --   --   --  30*  --  31   BUN  --   --   --   --   --   --   --  74.2*  --  61.1*  --  44.1*  --   --   --  61.0*  --  46*   CR  --   --   --    --   --   --   --  3.06*  --  2.72*  --  2.23*  --   --   --  3.02*  --  2.40*   * 129* 143* 178* 121* 127*   < > 154*   < > 162*   < > 150*   < >  --    < > 134*   < > 160*   KELLY  --   --   --   --   --   --   --  8.5*  --  8.6  --  8.0*  --   --   --  8.1*  --  7.8*    < > = values in this interval not displayed.       INRNo lab results found in last 7 days.     Recent Labs   Lab Test 01/04/23  0623 12/27/22  0420 12/26/22  0436 12/22/22  1108 12/21/22  1315 12/17/22  0519 12/16/22  2239   INR  --   --   --   --  1.36*  --  1.14   WBC  --  7.5 4.8   < > 3.4*   < > 3.9*   HGB 6.3* 8.8* 8.1*   < > 8.0*   < > 7.9*  7.9*   PLT 58* 63* 55*   < > 75*   < > 94*    < > = values in this interval not displayed.       Personally reviewed current labs       Linda Mcmahon NP  [facility]  [phone]

## 2023-01-04 NOTE — PLAN OF CARE
Problem: Artificial Airway  Goal: Effective Communication  Outcome: Progressing  Goal: Optimal Device Function  Outcome: Progressing  Intervention: Optimize Device Care and Function  Recent Flowsheet Documentation  Taken 1/3/2023 2308 by Lyudmila Galvin RT  Airway Safety Measures: all equipment/monitors on and audible  Taken 1/3/2023 2023 by Lyudmila Galvin RT  Airway Safety Measures: all equipment/monitors on and audible  Goal: Absence of Device-Related Skin or Tissue Injury  Outcome: Progressing     Problem: Mechanical Ventilation Invasive  Goal: Effective Communication  Outcome: Progressing  Goal: Optimal Device Function  Outcome: Progressing  Intervention: Optimize Device Care and Function  Recent Flowsheet Documentation  Taken 1/3/2023 2308 by Lyudmila Galvin RT  Airway Safety Measures: all equipment/monitors on and audible  Taken 1/3/2023 2023 by Lyudmila Galvin RT  Airway Safety Measures: all equipment/monitors on and audible  Goal: Mechanical Ventilation Liberation  Outcome: Progressing  Goal: Optimal Nutrition Delivery  Outcome: Progressing  Goal: Absence of Device-Related Skin and Tissue Injury  Outcome: Progressing  Goal: Absence of Ventilator-Induced Lung Injury  Outcome: Progressing      RT PROGRESS NOTE     DATA:     CURRENT SETTINGS: 12, 450, +5             TRACH TYPE / SIZE: #6 XLT Shiley proximal, placed on 11/29.             MODE: AC              FIO2: 24%     ACTION:             THERAPIES: Duoneb,Mucomyst QID              SUCTION:                           FREQUENCY: X2                        AMOUNT: small                         CONSISTENCY: thick                        COLOR: brown             SPONTANEOUS COUGH EFFORT/STRENGTH OF EFFORT (not elicited by suctioning): good productive                            WEANING PHASE: 2                        WEAN MODE:  30%/30L TM/cuff up                         WEAN TIME: 13 hours                        END WEAN REASON: for night     RESPONSE:              BS: coarse             VITAL SIGNS: BP 99/69 (BP Location: Left arm, Patient Position: Semi-Salgado's, Cuff Size: Adult Regular)   Pulse 99   Temp 98.4  F (36.9  C) (Oral)   Resp 23   Wt 94.8 kg (209 lb)   SpO2 100%   BMI 28.35 kg/m                    EMOTIONAL NEEDS / CONCERNS:                  RISK FOR SELF DECANNULATION:                          RISK DUE TO:                          INTERVENTION/S IN PLACE IS/ARE:         NOTE / PLAN: Patient wean on TM very well, back on vent for night.  Neb txs given as order. Will cont to monitor.

## 2023-01-04 NOTE — SIGNIFICANT EVENT
Significant Event Note    Time of event: 0729    Description of event:  Rapid Response called for pt and I presented to the room.    Nursing reported the following:  He was found to have SpO2 of 65%  He was mentating well at the time but was having significant SOB.  RT intervened and suctioned the pt. They were able to suction a significant amount of mucus.  After suctioning, the pt's SpO2 returned to 98%.    On exam the pt appears comfortable. He is alert and responsive, answering questions appropriately. Lungs CTAB on ascultation, RRR, nl S1S2.     RN also noted that Hgb was 6.3 this AM. No clear source of bleeding identified on gross bedside exam.    Plan:  - RT to administer nebs  - suctioning frequently through day  - 1u pRBC ordered for low hgb  - will monitor for further SpO2 drops    Discussed with: charge nurse, RT    Beni Howard MD

## 2023-01-04 NOTE — SIGNIFICANT EVENT
Rapid Response called around 0715 for O2 desaturation due to mucus plugging. O2 was down to 65%. Ambubag used and tracheal suction and lavage done. Patient is alert and oriented x 4. Vitals was stable at this time. B. Staff responded appropriately.     /84 (BP Location: Left arm, Patient Position: Semi-Salgado's, Cuff Size: Adult Regular)   Pulse 98   Temp 97.6  F (36.4  C) (Oral)   Resp 20   Wt 93.8 kg (206 lb 12.8 oz)   SpO2 98%   BMI 28.05 kg/m

## 2023-01-04 NOTE — PLAN OF CARE
Problem: Chronic Kidney Disease  Goal: Fluid Balance  Outcome: Not Progressing   Goal Outcome Evaluation:  Patient has minimal to moderate amount of bleeding at the wound at the groin, tip of small finger of left and right hand. Up in the chair for almost 3 hours. Alert but with some confusion. Small amount of bypassing from the rectal tube. Stool moderate amount.

## 2023-01-05 ENCOUNTER — APPOINTMENT (OUTPATIENT)
Dept: SPEECH THERAPY | Facility: CLINIC | Age: 58
DRG: 207 | End: 2023-01-05
Attending: HOSPITALIST
Payer: COMMERCIAL

## 2023-01-05 ENCOUNTER — APPOINTMENT (OUTPATIENT)
Dept: OCCUPATIONAL THERAPY | Facility: CLINIC | Age: 58
DRG: 207 | End: 2023-01-05
Attending: HOSPITALIST
Payer: COMMERCIAL

## 2023-01-05 ENCOUNTER — APPOINTMENT (OUTPATIENT)
Dept: PHYSICAL THERAPY | Facility: CLINIC | Age: 58
DRG: 207 | End: 2023-01-05
Attending: HOSPITALIST
Payer: COMMERCIAL

## 2023-01-05 LAB
BASE EXCESS BLDV CALC-SCNC: 8 MMOL/L (ref -8.1–1.9)
CA-I BLD-MCNC: 1.19 MMOL/L (ref 1.11–1.3)
GLUCOSE BLD-MCNC: 121 MG/DL (ref 70–125)
GLUCOSE BLDC GLUCOMTR-MCNC: 138 MG/DL (ref 70–99)
HCO3 BLDV-SCNC: 31 MMOL/L (ref 24–30)
HGB BLD-MCNC: 7.3 G/DL (ref 13.3–17.7)
HGB BLD-MCNC: 7.6 G/DL (ref 13.3–17.7)
LACTATE BLD-SCNC: 1.1 MMOL/L (ref 0.7–2)
PCO2 BLDV: 49 MM HG (ref 35–50)
PH BLDV: 7.43 [PH] (ref 7.35–7.45)
PHOSPHATE SERPL-MCNC: 2.5 MG/DL (ref 2.5–4.5)
PO2 BLDV: 39 MM HG (ref 25–47)
POTASSIUM BLD-SCNC: 3.5 MMOL/L (ref 3.5–5)
SATV LHE POCT: 76 % (ref 70–75)
SODIUM BLD-SCNC: 136 MMOL/L (ref 136–145)

## 2023-01-05 PROCEDURE — 250N000013 HC RX MED GY IP 250 OP 250 PS 637: Performed by: HOSPITALIST

## 2023-01-05 PROCEDURE — 94799 UNLISTED PULMONARY SVC/PX: CPT

## 2023-01-05 PROCEDURE — 92507 TX SP LANG VOICE COMM INDIV: CPT | Mod: GN | Performed by: SPEECH-LANGUAGE PATHOLOGIST

## 2023-01-05 PROCEDURE — 85018 HEMOGLOBIN: CPT | Performed by: HOSPITALIST

## 2023-01-05 PROCEDURE — 94640 AIRWAY INHALATION TREATMENT: CPT

## 2023-01-05 PROCEDURE — 94003 VENT MGMT INPAT SUBQ DAY: CPT | Performed by: NURSE PRACTITIONER

## 2023-01-05 PROCEDURE — 999N000009 HC STATISTIC AIRWAY CARE

## 2023-01-05 PROCEDURE — 97530 THERAPEUTIC ACTIVITIES: CPT | Mod: GP | Performed by: PHYSICAL THERAPIST

## 2023-01-05 PROCEDURE — 99233 SBSQ HOSP IP/OBS HIGH 50: CPT | Performed by: HOSPITALIST

## 2023-01-05 PROCEDURE — 94640 AIRWAY INHALATION TREATMENT: CPT | Mod: 76

## 2023-01-05 PROCEDURE — 120N000017 HC R&B RESPIRATORY CARE

## 2023-01-05 PROCEDURE — G0463 HOSPITAL OUTPT CLINIC VISIT: HCPCS

## 2023-01-05 PROCEDURE — 250N000009 HC RX 250: Performed by: NURSE PRACTITIONER

## 2023-01-05 PROCEDURE — 250N000009 HC RX 250: Performed by: HOSPITALIST

## 2023-01-05 PROCEDURE — 250N000012 HC RX MED GY IP 250 OP 636 PS 637: Performed by: HOSPITALIST

## 2023-01-05 PROCEDURE — 250N000013 HC RX MED GY IP 250 OP 250 PS 637: Performed by: NURSE PRACTITIONER

## 2023-01-05 PROCEDURE — 82330 ASSAY OF CALCIUM: CPT

## 2023-01-05 PROCEDURE — 97535 SELF CARE MNGMENT TRAINING: CPT | Mod: GO | Performed by: OCCUPATIONAL THERAPIST

## 2023-01-05 PROCEDURE — 94003 VENT MGMT INPAT SUBQ DAY: CPT

## 2023-01-05 PROCEDURE — 999N000253 HC STATISTIC WEANING TRIALS

## 2023-01-05 PROCEDURE — 97110 THERAPEUTIC EXERCISES: CPT | Mod: GP | Performed by: PHYSICAL THERAPIST

## 2023-01-05 PROCEDURE — 84100 ASSAY OF PHOSPHORUS: CPT | Performed by: HOSPITALIST

## 2023-01-05 PROCEDURE — 999N000157 HC STATISTIC RCP TIME EA 10 MIN

## 2023-01-05 RX ADMIN — LACOSAMIDE 100 MG: 10 SOLUTION ORAL at 08:14

## 2023-01-05 RX ADMIN — Medication 5 ML: at 08:14

## 2023-01-05 RX ADMIN — Medication 12.5 MG: at 04:51

## 2023-01-05 RX ADMIN — ACETYLCYSTEINE 2 ML: 200 SOLUTION ORAL; RESPIRATORY (INHALATION) at 12:42

## 2023-01-05 RX ADMIN — FLUCONAZOLE 200 MG: 200 TABLET ORAL at 21:35

## 2023-01-05 RX ADMIN — ALBUTEROL SULFATE 2.5 MG: 2.5 SOLUTION RESPIRATORY (INHALATION) at 16:14

## 2023-01-05 RX ADMIN — Medication 1 DROP: at 13:21

## 2023-01-05 RX ADMIN — MIDODRINE HYDROCHLORIDE 5 MG: 5 TABLET ORAL at 17:15

## 2023-01-05 RX ADMIN — INSULIN GLARGINE 20 UNITS: 100 INJECTION, SOLUTION SUBCUTANEOUS at 08:16

## 2023-01-05 RX ADMIN — TACROLIMUS 1 MG: 5 CAPSULE ORAL at 08:14

## 2023-01-05 RX ADMIN — QUETIAPINE 75 MG: 25 TABLET ORAL at 21:35

## 2023-01-05 RX ADMIN — LACTULOSE 20 G: 20 SOLUTION ORAL at 08:14

## 2023-01-05 RX ADMIN — ALBUTEROL SULFATE 2.5 MG: 2.5 SOLUTION RESPIRATORY (INHALATION) at 20:11

## 2023-01-05 RX ADMIN — ACETAMINOPHEN 975 MG: 325 TABLET, FILM COATED ORAL at 13:21

## 2023-01-05 RX ADMIN — FOLIC ACID 1 MG: 1 TABLET ORAL at 08:14

## 2023-01-05 RX ADMIN — NYSTATIN 500000 UNITS: 100000 SUSPENSION ORAL at 13:20

## 2023-01-05 RX ADMIN — Medication 1 DROP: at 21:36

## 2023-01-05 RX ADMIN — TACROLIMUS 1 MG: 5 CAPSULE ORAL at 17:15

## 2023-01-05 RX ADMIN — ACETAMINOPHEN 650 MG: 325 TABLET, FILM COATED ORAL at 18:08

## 2023-01-05 RX ADMIN — NYSTATIN 500000 UNITS: 100000 SUSPENSION ORAL at 08:14

## 2023-01-05 RX ADMIN — CHLORHEXIDINE GLUCONATE 0.12% ORAL RINSE 15 ML: 1.2 LIQUID ORAL at 08:14

## 2023-01-05 RX ADMIN — NYSTATIN 500000 UNITS: 100000 SUSPENSION ORAL at 21:33

## 2023-01-05 RX ADMIN — ACETAMINOPHEN 975 MG: 325 TABLET, FILM COATED ORAL at 21:34

## 2023-01-05 RX ADMIN — RIFAXIMIN 550 MG: 550 TABLET ORAL at 21:36

## 2023-01-05 RX ADMIN — Medication 6 MG: at 21:36

## 2023-01-05 RX ADMIN — ALBUTEROL SULFATE 2.5 MG: 2.5 SOLUTION RESPIRATORY (INHALATION) at 08:27

## 2023-01-05 RX ADMIN — Medication 1 DROP: at 08:13

## 2023-01-05 RX ADMIN — ACETAMINOPHEN 975 MG: 325 TABLET, FILM COATED ORAL at 06:32

## 2023-01-05 RX ADMIN — Medication 40 MG: at 21:34

## 2023-01-05 RX ADMIN — LACTULOSE 20 G: 20 SOLUTION ORAL at 21:33

## 2023-01-05 RX ADMIN — RIFAXIMIN 550 MG: 550 TABLET ORAL at 08:13

## 2023-01-05 RX ADMIN — Medication 40 MG: at 08:17

## 2023-01-05 RX ADMIN — Medication 12.5 MG: at 18:08

## 2023-01-05 RX ADMIN — AMOXICILLIN AND CLAVULANATE POTASSIUM 500 MG: 400; 57 POWDER, FOR SUSPENSION ORAL at 21:36

## 2023-01-05 RX ADMIN — LEVETIRACETAM 1000 MG: 100 SOLUTION ORAL at 21:33

## 2023-01-05 RX ADMIN — ACETYLCYSTEINE 2 ML: 200 SOLUTION ORAL; RESPIRATORY (INHALATION) at 20:10

## 2023-01-05 RX ADMIN — ACETYLCYSTEINE 2 ML: 200 SOLUTION ORAL; RESPIRATORY (INHALATION) at 08:27

## 2023-01-05 RX ADMIN — ALBUTEROL SULFATE 2.5 MG: 2.5 SOLUTION RESPIRATORY (INHALATION) at 12:42

## 2023-01-05 RX ADMIN — NYSTATIN 500000 UNITS: 100000 SUSPENSION ORAL at 17:15

## 2023-01-05 RX ADMIN — ZOLPIDEM TARTRATE 5 MG: 5 TABLET ORAL at 01:37

## 2023-01-05 RX ADMIN — WHITE PETROLATUM: 1.75 OINTMENT TOPICAL at 08:16

## 2023-01-05 RX ADMIN — CHLORHEXIDINE GLUCONATE 0.12% ORAL RINSE 15 ML: 1.2 LIQUID ORAL at 21:33

## 2023-01-05 RX ADMIN — ACETYLCYSTEINE 2 ML: 200 SOLUTION ORAL; RESPIRATORY (INHALATION) at 16:15

## 2023-01-05 RX ADMIN — LACOSAMIDE 100 MG: 10 SOLUTION ORAL at 21:36

## 2023-01-05 ASSESSMENT — ACTIVITIES OF DAILY LIVING (ADL)
ADLS_ACUITY_SCORE: 62
ADLS_ACUITY_SCORE: 62
ADLS_ACUITY_SCORE: 58
ADLS_ACUITY_SCORE: 62
ADLS_ACUITY_SCORE: 58
ADLS_ACUITY_SCORE: 62

## 2023-01-05 NOTE — PLAN OF CARE
Problem: Pain Acute  Goal: Optimal Pain Control and Function  Outcome: Progressing  Intervention: Prevent or Manage Pain  Recent Flowsheet Documentation  Taken 1/5/2023 0100 by Jesse Sheriff Jr, RN  Medication Review/Management: medications reviewed     Problem: Enteral Nutrition  Goal: Absence of Aspiration Signs and Symptoms  Outcome: Progressing  Intervention: Minimize Aspiration Risk  Recent Flowsheet Documentation  Taken 1/5/2023 0100 by Jesse Sheriff Jr, RN  Head of Bed (HOB) Positioning: HOB at 30-45 degrees     Problem: Mechanical Ventilation Invasive  Goal: Effective Communication  Outcome: Progressing  Intervention: Ensure Effective Communication  Recent Flowsheet Documentation  Taken 1/5/2023 0100 by Jesse Sheriff Jr, RN  Trust Relationship/Rapport: care explained    Patient is alert with some confusion noted but can be reoriented. PICC line patent, flushing done. Patient has been tolerating his continuous tube feeding well. Rectal tube in place with some stool bypassing noted.  Patient is on telemetry monitoring with a reading of cardiac dysrhythmias. PRN Zolpidem tab 5mg given at around 0137hrs due to patient verbalized that he couldn't sleep. At around 0451hrs, Seroquel PRN was given due to some agitation has been noted. Patient has been noticed to keep pulling off his vent to make an alarm sound so that he can be attended by staffs. Staffs has been reorienting the patient that he can use the call light whenever he needs something. Left a note to the provider regarding patient`s behavior.

## 2023-01-05 NOTE — PROGRESS NOTES
"SPIRITUAL HEALTH SERVICES ([facility]S)  SPIRITUAL ASSESSMENT Progress Note  [facility]. Unit LTACH    REFERRAL SOURCE: SELF     [place] that Mr Osborne is a Buddhist and appreciates receiving  Communion but \"no wafers'. His wife showed me how to administer it to him using one of the green mouth swabs. His Father-in-law, Liu Roberts, is a Buddhist .  His brother and sister were also in the room visiting. This was a brief visit.      PLAN: I let the family know that Spiritual Care is available for both the family and patient as needed.      Keya Solomon, Ph.D., [facility]      [facility] available 24/7 for emergency requests/referrals, either by having the on-call  paged or by entering an ASAP/STAT consult in Epic (this will also page the on-call ).  "

## 2023-01-05 NOTE — PROGRESS NOTES
Social Work Note:  Patient chart reviewed.  Patient discussed in morning rounds.  Barriers to discharge:   * Trach. Vent.  * Tele monitor  * Rectal tube.     Patient here with Vent/Trach/Nebs, tele monitor, rectal tube, feeding tube , and new dialysis.    Writer met with patient and spouse yesterday, answered questions.  Scheduled Care conference for Tuesday 01/10/23 @ 13:00.  Patient's exact discharge date, time, disposition TBD  SW will continue to follow for psychosocial and emotional support of patient and family. SW to facilitate discharge to TCU when pt no longer requires LTACH level of care.      Kb Liu, [facility]ULIE  [place] LTJAYLYN/[place]  [phone]

## 2023-01-05 NOTE — PROGRESS NOTES
[facility]    Medicine Progress Note - Hospitalist Service    Date of Admission:  12/29/2022    History of Present Illness  Blanco Osborne is an 57 year old male who is transferred from [facility] for rehab and continuation of IV antibiotic treatment.  Patient has multiple medical problems including history of liver transplant 2016, PAF on chronic anticoagulation, history of DM2, CVA, HTN, CHRISTIAN on BiPAP, and history of alcohol abuse in the past causing liver damage ending with liver transplant.  About 6 weeks ago he had respiratory arrest and was diagnosed with infection with parainfluenza and strep pneumoniae and acute on chronic renal insufficiency ending with CRF needing 3/week hemodialysis.  During this period he was diagnosed with cirrhosis of transplanted liver and hyperammonia.  Currently he is on Lactulose and Rifaximin.        On 12/14/22, due to CIP, he was transferred to [facility] for the first time with Trach, PEG and Rt chest tube.    On 12/16/2022 patient was transferred back to [facility] due to respiratory insufficiency secondary to hydropneumothorax and drainage of 900 cc bloody pleural effusion, bloody stool and transfusion of 2 units PRBC.      On 12/20/2022 he had another episode of PEA arrest followed by CPR x2 minutes and possibly had seizure activity in [facility]. 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  He has been on ventilator and has improved to trach dome tolerating up to 6 hours so far. He had chest tube which was removed.    He had SBP with growth of lactobacillus and is currently on Unasyn which will be switched to Augmentin orally through 1/12/2023.    Pleural effusion grew Candida and is on micafungin.  He is also on  due to cirrhosis and hyperammonia.  He is anemic and is on Retacrit.   He is on lacosamide and Keppra due to history of seizure.  There is  question of him drinking again causing him cirrhosis of the transplanted liver.      Assessment & Plan   Principal Problem:    Acute on chronic respiratory failure with hypoxia (H)  Active Problems:    Acquired immunocompromised state (H)    Cirrhosis of liver (H)    NAFLD (nonalcoholic fatty liver disease)    Liver transplanted (H)    Immunosuppression (H)    Acute kidney injury (H)    Spontaneous bacterial peritonitis (H)    Critical illness myopathy    History of sudden cardiac arrest, PEA    Dependent on hemodialysis (H)    Pulmonary:  Acute now chronic respiratory failure requiring tracheostomy..  Initial event was parainfluenza and strep pneumo pneumonia.  Had failed extubation x2 and tracheostomy performed last hospitalization.  Had additional complications of bilateral pneumothoraces.  (Most recently was a hydropneumothorax where fluid grew Candida..  Chest tube was placed and removed during this hospitalization.  Had decent tolerance for pressure support and trach dome but after few hours and trach dome 1227 had a pretty significant episode of desaturation.   Plan:  1.  We will alternate trach dome and pressure support and try to wean.  Pulmonary is consulted.   2.  At least 4 weeks of antimicrobial for Candida detailed in ID section    Cardiovascular system:  PEA arrest prior to his previous admission and 1 episode of PEA associated with desaturation on 1220.  No structural cardiac disease but does have A. Fib which neurologist thought might have been contributory to his embolic strokes.  Anticoagulation been restarted with stable labs although high risk for bleeding seconday to thrombocytopenia.  Also has developed baseline hypotension and is on midodrine 10 mg 3 times daily.  Plan:  Continue Eliquis.  Continue current midodrine dose     CNS:  1. Seizure after hypoxic event.  This is in the context of baseline multifactorial encephalopathy secondary to his ongoing critical illness and prior cardiac  arrests and prior strokes and cirrhosis with hepatic encephalopathy. MRI of head of 12/20/22 reveals no PRES or leptomeningeal enhancement but scattered.  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   Plan:  1.  Keppra and vimpat indefinitely.  Neurology follow-up.  2.  Anticoagulation indefinitely for secondary stroke prevention.  3. Tylenol TID for headache.        Renal/Electrolytes:  1. LORETTA, ?CRI: Now on iHD.  No return of renal function.  - MW schedule      ID:  1. 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 4.  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  Have had some conversations within our group and with transplant ID and general infectious disease.  General consensus is that ganciclovir probably could be discontinued  2. H/o Strep/Parainfluenza infection last hospitalization. Completed treatment course  3. 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  4.  Lactobacillus growing in the broth 4 days after paracentesis.  Cell count was very low.  5.  Candida in left pleural fluid cultures.  Sensitivities not resulted.  6.  Immunosuppressed on tacrolimus.  Discussed with transplant service at the [facility] who felt that given the absence of rejection on his most recent biopsy and ongoing issues with infection continuing with 1 twice daily tacrolimus is the best current option.  They have seen his subtherapeutic trough levels.  Plan:  1.  Confirm with transplant ID whether or not we can stop ganciclovir.  2.  Unasyn for 2 weeks total.  Change to Augmentin through 1/12/2023.  3.  At least 4 weeks of antimicrobial for the Candida.  If sensitive to fluconazole could de-escalate from micafungin.  4.  Continue tacrolimus as above and we are tapering steroids     GI/:  Post transplant cirrhosis.  Main manifestations of this are hepatic encephalopathy and ascites.  Hepatologist at [facility] felt that most likely reason for the cirrhosis was metabolic syndrome or alcohol intake.  There was  no evidence of rejection on biopsy and there was some evidence of regeneration. Paracentesis done on 12/22/2022 with lack of bacillus in the broth indicating SBP low cell count was very low  Plan:  1.  Intermittent paracentesis as needed.  2.  Getting 1 mg  tacrolimus BID and tapering steroids   3. Lactulose and Rifaximin as above  4.  Treat SBP for 2 weeks.  5.  He has an appointment in April with Dr. Simms if he is out of the hospital.     Endocrine:  1. DM.  Titrating insulin. He needs supplemental coverage.  2. He is on steroids which we are weaning  Plan:  Continue with current regimen.      Nutrition:  At goal tube feeds via PEG tube     Heme/Onc:  1. Pancytopenia; possibly due cirrhosis. WBC count has improved . Chronic splenomegaly was present prior to liver transplant but has not regressed.   2. Needs anticoagulation for AFib.   Plan:  1.  Continue Eliquis      ICU prophylaxis:      DVT PPX: Eliquis      Restraints needed: No     Stress ulcer: IV PPI     Central line: Needed for IV access/Meds.     Code Status: Full     Diet: Adult Formula Drip Feeding: Continuous Novasource Renal; Gastrostomy; Goal Rate: 60; mL/hr    DVT Prophylaxis: DOAC  Nixon Catheter: Not present  Central Lines: PRESENT      Cardiac Monitoring: ACTIVE order. Indication: Tachyarrhythmias, acute (48 hours)  Code Status: Full Code      Disposition Plan     Expected Discharge Date: 01/09/2023    Discharge Delays: Complex Discharge    Discharge Comments: Vent. Trach. Phase 1. PEG.  Rectal Tube.      The patient's care was discussed with the Bedside Nurse, Patient and Patient's Family.    Edgar Fritz MD  Hospitalist Service  [facility]  Securely message with the Vocera Web Console (learn more here)  Text page via [employer] Paging/Directory         Clinically Significant Risk Factors              # Hypoalbuminemia: Lowest albumin = 1.8 g/dL at 12/29/2022  4:40 AM, will monitor as appropriate   # Thrombocytopenia: Lowest platelets = 58 in last 2  days, will monitor for bleeding           # Severe Malnutrition: based on nutrition assessment        ______________________________________________________________________    Interval History   Patient is sitting in bed and is in no distress. He has tracheostomy and is on vent.  He is comfortable.  He did not verbalize any concerns or discomfort.  He has some degree of confusion and disorientation. Patient denies any chest pain or shortness of breath or abdominal pain or nausea or vomiting or current headache. He is on hemodialysis 3 days a week.  Vital signs are blood glucose are stable.  He is on phase 2 of vent weaning.  His platelet count is down to 58K.   Due to history of embolic CVA and PAF he is on Eliquis.  However he has drop in his hemoglobin and received 1 unit of PRBC today.  He is in sinus rhythm.  I placed him on telemetry and watching his rhythm.  I hold his Eliquis for now as long as he is in sinus rhythm.  He develops bleeding with bronchial suction which hopefully results with holding Eliquis.   This morning he had 1 episode of hypoxemia of 68% SPO2 which was most likely due to mucous plug and resolved after suction of thick secretions.  Currently is a stable.  Pulmonology following.    Data reviewed today: I reviewed all medications, new labs and imaging results over the last 24 hours. I personally reviewed no images or EKG's today.    Physical Exam   Vital Signs: Temp: 97.7  F (36.5  C) Temp src: Oral BP: 108/64 Pulse: 101   Resp: 22 SpO2: 100 % O2 Device: Trach dome Oxygen Delivery: 35 LPM  Weight: 208 lbs 12.8 oz  Patient is sitting in bed.  Is in no acute distress.  Looks pale and chronically ill.  He communicates and is pleasant and cooperative.  He is on ventilator through tracheostomy  HEENT: Nl oral mucosal moisture.  Pale mucosa, no icterus.  Neck: Tracheostomy in place.  Right external jugular CVC.  Heart: Regular rate and rhythm, no murmur  Abdomen: Moderately enlarged due to ascites.   Nontender.  No mass or organomegaly noted.  Patient has rectal tube.  PEG tube in place.  : Deferred  Extremities: No edema.  PICC line in right brachial artery.  Neurological exam: Patient is alert and oriented x2.  Has normal behavior.  Gross cranial nerve neurological exam did not show any major abnormality.  Left  is weaker than the right otherwise no focal neurological deficit noted in limited neurological exam. Gait and station were not examined.    Data   Recent Labs   Lab 01/04/23  0733 01/04/23  0629 01/04/23  0623 01/03/23  0934 01/02/23  0813 01/02/23  0527 01/01/23  0759 01/01/23  0626 12/31/22  0826 12/31/22  0610   HGB  --   --  6.3*  --   --   --   --   --   --   --    PLT  --   --  58*  --   --   --   --   --   --   --    NA  --   --   --   --   --  134*  --  142  --  137   POTASSIUM  --   --   --   --   --  3.7  --  3.7  --  3.7   CHLORIDE  --   --   --   --   --  95*  --  101  --  99   CO2  --   --   --   --   --  29  --  30*  --  29   BUN  --   --   --   --   --  74.2*  --  61.1*  --  44.1*   CR  --   --   --   --   --  3.06*  --  2.72*  --  2.23*   ANIONGAP  --   --   --   --   --  10  --  11  --  9   KELLY  --   --   --   --   --  8.5*  --  8.6  --  8.0*   * 129*  --  143*   < > 154*   < > 162*   < > 150*   ALBUMIN  --   --   --   --   --  2.2*  --  2.4*  --  2.2*    < > = values in this interval not displayed.     Medications     dextrose       - MEDICATION INSTRUCTIONS -         sodium chloride 0.9%  300 mL Hemodialysis Machine During Dialysis/CRRT (from stock)     acetaminophen  975 mg Per Feeding Tube Q8H BIJU    Or     acetaminophen  650 mg Rectal Q8H BIJU     acetylcysteine  2 mL Nebulization 4x daily     albuterol  2.5 mg Nebulization 4x daily     amoxicillin-clavulanate  500 mg Per Feeding Tube QPM     amoxicillin-clavulanate  500 mg Per Feeding Tube Once per day on Mon Wed Fri     [Held by provider] apixaban ANTICOAGULANT  5 mg Oral or Feeding Tube BID     B and C vitamin  Complex with folic acid  5 mL Per Feeding Tube Daily     carboxymethylcellulose PF  1 drop Both Eyes TID     chlorhexidine  15 mL Mouth/Throat BID     epoetin mirta-epbx  10,000 Units Subcutaneous Once per day on Mon Wed Fri     fluconazole  200 mg Per Feeding Tube At Bedtime     folic acid  1 mg Oral or Feeding Tube Daily     heparin Lock (1000 units/mL High concentration)  1,900 Units Intracatheter During Dialysis/CRRT (from stock)     heparin Lock (1000 units/mL High concentration)  1,900 Units Intracatheter During Dialysis/CRRT (from stock)     insulin aspart  1-6 Units Subcutaneous Daily     insulin glargine  20 Units Subcutaneous Daily     lacosamide  100 mg Oral or Feeding Tube BID     lactulose  20 g Oral or Feeding Tube BID     levETIRAcetam  1,000 mg Oral or Feeding Tube Q24H     Melatonin  6 mg Oral or NG Tube At Bedtime     midodrine  5 mg Oral or Feeding Tube TID w/meals     midodrine  5 mg Oral During Dialysis/CRRT (from stock)     mineral oil-hydrophilic petrolatum   Topical Daily     nystatin  500,000 Units Swish & Swallow 4x Daily     pantoprazole  40 mg Per Feeding Tube BID     QUEtiapine  50 mg Oral or Feeding Tube At Bedtime     rifaximin  550 mg Per Feeding Tube BID     sodium chloride (PF)  10-30 mL Intracatheter Q8H     sodium chloride (PF)  9 mL Intracatheter During Dialysis/CRRT (from stock)     sodium chloride (PF)  9 mL Intracatheter During Dialysis/CRRT (from stock)     tacrolimus  1 mg Oral or Feeding Tube BID

## 2023-01-05 NOTE — PROGRESS NOTES
[facility]    Medicine Progress Note - Hospitalist Service    Date of Admission:  12/29/2022    History of Present Illness  Blanco Osborne is an 57 year old male who is transferred from [facility] for rehab and continuation of IV antibiotic treatment.  Patient has multiple medical problems including history of liver transplant 2016, PAF on chronic anticoagulation, history of DM2, CVA, HTN, CHRISTIAN on BiPAP, and history of alcohol abuse in the past causing liver damage ending with liver transplant.  About 6 weeks ago he had respiratory arrest and was diagnosed with infection with parainfluenza and strep pneumoniae and acute on chronic renal insufficiency ending with CRF needing 3/week hemodialysis.  During this period he was diagnosed with cirrhosis of transplanted liver and hyperammonia.  Currently he is on Lactulose and Rifaximin.        On 12/14/22, due to CIP, he was transferred to [facility] for the first time with Trach, PEG and Rt chest tube.    On 12/16/2022 patient was transferred back to [facility] due to respiratory insufficiency secondary to hydropneumothorax and drainage of 900 cc bloody pleural effusion, bloody stool and transfusion of 2 units PRBC.      On 12/20/2022 he had another episode of PEA arrest followed by CPR x2 minutes and possibly had seizure activity in [facility]. 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  He has been on ventilator and has improved to trach dome tolerating up to 6 hours so far. He had chest tube which was removed.    He had SBP with growth of lactobacillus and is currently on Unasyn which will be switched to Augmentin orally through 1/12/2023.    Pleural effusion grew Candida and is on micafungin.  He is also on  due to cirrhosis and hyperammonia.  He is anemic and is on Retacrit.   He is on lacosamide and Keppra due to history of seizure.  There is  question of him drinking again causing him cirrhosis of the transplanted liver.    Assessment & Plan   Principal Problem:    Acute on chronic respiratory failure with hypoxia (H)  Active Problems:    Acquired immunocompromised state (H)    Cirrhosis of liver (H)    NAFLD (nonalcoholic fatty liver disease)    Liver transplanted (H)    Immunosuppression (H)    Acute kidney injury (H)    Spontaneous bacterial peritonitis (H)    Critical illness myopathy    History of sudden cardiac arrest, PEA    Dependent on hemodialysis (H)    Pulmonary:  Acute now chronic respiratory failure requiring tracheostomy..  Initial event was parainfluenza and strep pneumo pneumonia.  Had failed extubation x2 and tracheostomy performed last hospitalization.  Had additional complications of bilateral pneumothoraces.  (Most recently was a hydropneumothorax where fluid grew Candida..  Chest tube was placed and removed during this hospitalization.  Had decent tolerance for pressure support and trach dome but after few hours and trach dome 1227 had a pretty significant episode of desaturation.   Plan:  1.  We will alternate trach dome and pressure support and try to wean.  Pulmonary is consulted.   2.  At least 4 weeks of antimicrobial for Candida detailed in ID section    Cardiovascular system:  PEA arrest prior to his previous admission and 1 episode of PEA associated with desaturation on 1220.  No structural cardiac disease but does have A. Fib which neurologist thought might have been contributory to his embolic strokes.  Anticoagulation been restarted with stable labs although high risk for bleeding seconday to thrombocytopenia.  Also has developed baseline hypotension and is on midodrine 10 mg 3 times daily.  Plan:  Continue Eliquis.  Continue current midodrine dose     CNS:  1. Seizure after hypoxic event.  This is in the context of baseline multifactorial encephalopathy secondary to his ongoing critical illness and prior cardiac arrests  and prior strokes and cirrhosis with hepatic encephalopathy. MRI of head of 12/20/22 reveals no PRES or leptomeningeal enhancement but scattered.  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   Plan:  1.  Keppra and vimpat indefinitely.  Neurology follow-up.  2.  Anticoagulation indefinitely for secondary stroke prevention.  3. Tylenol TID for headache.        Renal/Electrolytes:  1. LORETTA, ?CRI: Now on iHD.  No return of renal function.  - [facility] schedule      ID:  1. 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 4.  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  Have had some conversations within our group and with transplant ID and general infectious disease.  General consensus is that ganciclovir probably could be discontinued  2. H/o Strep/Parainfluenza infection last hospitalization. Completed treatment course  3. 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  4.  Lactobacillus growing in the broth 4 days after paracentesis.  Cell count was very low.  5.  Candida in left pleural fluid cultures.  Sensitivities not resulted.  6.  Immunosuppressed on tacrolimus.  Discussed with transplant service at the [facility] who felt that given the absence of rejection on his most recent biopsy and ongoing issues with infection continuing with 1 twice daily tacrolimus is the best current option.  They have seen his subtherapeutic trough levels.  Plan:  1.  Confirm with transplant ID whether or not we can stop ganciclovir.  2.  Unasyn for 2 weeks total.  Change to Augmentin through 1/12/2023.  3.  At least 4 weeks of antimicrobial for the Candida.  If sensitive to fluconazole could de-escalate from micafungin.  4.  Continue tacrolimus as above and we are tapering steroids     GI/:  Post transplant cirrhosis.  Main manifestations of this are hepatic encephalopathy and ascites.  Hepatologist at [facility] felt that most likely reason for the cirrhosis was metabolic syndrome or alcohol intake.  There was no  evidence of rejection on biopsy and there was some evidence of regeneration. Paracentesis done on 12/22/2022 with lack of bacillus in the broth indicating SBP low cell count was very low  Plan:  1.  Intermittent paracentesis as needed.  2.  Getting 1 mg  tacrolimus BID and tapering steroids   3. Lactulose and Rifaximin as above  4.  Treat SBP for 2 weeks.  5.  He has an appointment in April with Dr. Simms if he is out of the hospital.     Endocrine:  1. DM.  Titrating insulin. He needs supplemental coverage.  2. He is on steroids which we are weaning  Plan:  Continue with current regimen.      Nutrition:  At goal tube feeds via PEG tube     Heme/Onc:  1. Pancytopenia; possibly due cirrhosis. WBC count has improved . Chronic splenomegaly was present prior to liver transplant but has not regressed.   2. Needs anticoagulation for AFib.   Plan:  1.  Continue Eliquis      ICU prophylaxis:      DVT PPX: Eliquis      Restraints needed: No     Stress ulcer: IV PPI     Central line: Needed for IV access/Meds.     Code Status: Full     Diet: Adult Formula Drip Feeding: Continuous Novasource Renal; Gastrostomy; Goal Rate: 60; mL/hr    DVT Prophylaxis: DOAC  Nixon Catheter: Not present  Central Lines: PRESENT      Cardiac Monitoring: ACTIVE order. Indication: Tachyarrhythmias, acute (48 hours)  Code Status: Full Code      Disposition Plan      Expected Discharge Date: 01/12/2023    Discharge Delays: Complex Discharge    Discharge Comments: Vent. Trach. Phase 1. PEG.  Rectal Tube.      The patient's care was discussed with the Bedside Nurse, Patient and Patient's Family.    Edgar Fritz MD  Hospitalist Service  [facility]  Securely message with the Vocera Web Console (learn more here)  Text page via [employer] Paging/Directory         Clinically Significant Risk Factors              # Hypoalbuminemia: Lowest albumin = 1.8 g/dL at 12/29/2022  4:40 AM, will monitor as appropriate   # Thrombocytopenia: Lowest platelets = 58 in last 2  days, will monitor for bleeding           # Severe Malnutrition: based on nutrition assessment      _____________________________________________________________________    Interval History   Patient is sitting in bed and is in no distress. He has tracheostomy and is on vent.  He is comfortable.  He did not verbalize any concerns or discomfort.  He has unchanged degree of confusion and disorientation as usual. Patient denies any chest pain or shortness of breath or abdominal pain or nausea or vomiting or current headache. He is on hemodialysis 3 days a week.  Vital signs are blood glucose are stable.  He is on phase 2 of vent weaning.  His latest platelet count is down to 58K.  He had 1 unit of PRBC transfusion yesterday.  Today's hemoglobin is 7.6 g/dL.  We will recheck tomorrow.  Due to history of embolic CVA and PAF he is on Eliquis. He is in sinus rhythm.  I placed him on telemetry and watch his rhythm.  I hold his Eliquis for now as long as he is in sinus rhythm.  He develops bleeding with bronchial suction which hopefully resolves with holding Eliquis.   He has some behavioral issues especially at night as per nursing staff.  Tonight will receive increased dosage of Seroquel at 75 mg.  Currently is a stable.  Pulmonology following.    Data reviewed today: I reviewed all medications, new labs and imaging results over the last 24 hours. I personally reviewed no images or EKG's today.    Physical Exam   Vital Signs: Temp: 98.1  F (36.7  C) Temp src: Oral BP: 101/66 Pulse: 107   Resp: 22 SpO2: 100 % O2 Device: Trach dome Oxygen Delivery: 30 LPM  Weight: 208 lbs 12.8 oz  Patient is sitting in bed.  Is in no acute distress.  Looks pale and chronically ill.  He communicates and is pleasant and cooperative.  He is on ventilator through tracheostomy  HEENT: Nl oral mucosal moisture.  Pale mucosa, no icterus.  Neck: Tracheostomy in place.  Right external jugular CVC.  Heart: Regular rate and rhythm, no murmur  Abdomen:  Moderately enlarged due to ascites.  Nontender.  No mass or organomegaly noted.  Patient has rectal tube.  PEG tube in place.  : Deferred  Extremities: No edema.  PICC line in right brachial artery.  Neurological exam: Patient is alert and oriented x2.  Has normal behavior.  Gross cranial nerve neurological exam did not show any major abnormality.  Left  is weaker than the right otherwise no focal neurological deficit noted in limited neurological exam. Gait and station were not examined.    Data   Recent Labs   Lab 01/05/23  1442 01/05/23  0802 01/04/23  0733 01/04/23  0629 01/04/23  0623 01/02/23  0813 01/02/23  0527 01/01/23  0759 01/01/23  0626 12/31/22  0826 12/31/22  0610   HGB 7.6*  --   --   --  6.3*  --   --   --   --   --   --    PLT  --   --   --   --  58*  --   --   --   --   --   --    NA  --   --   --   --   --   --  134*  --  142  --  137   POTASSIUM  --   --   --   --   --   --  3.7  --  3.7  --  3.7   CHLORIDE  --   --   --   --   --   --  95*  --  101  --  99   CO2  --   --   --   --   --   --  29  --  30*  --  29   BUN  --   --   --   --   --   --  74.2*  --  61.1*  --  44.1*   CR  --   --   --   --   --   --  3.06*  --  2.72*  --  2.23*   ANIONGAP  --   --   --   --   --   --  10  --  11  --  9   KELLY  --   --   --   --   --   --  8.5*  --  8.6  --  8.0*   GLC  --  138* 166* 129*  --    < > 154*   < > 162*   < > 150*   ALBUMIN  --   --   --   --   --   --  2.2*  --  2.4*  --  2.2*    < > = values in this interval not displayed.     Medications     dextrose       - MEDICATION INSTRUCTIONS -         sodium chloride 0.9%  300 mL Hemodialysis Machine During Dialysis/CRRT (from stock)     acetaminophen  975 mg Per Feeding Tube Q8H BIJU    Or     acetaminophen  650 mg Rectal Q8H BIJU     acetylcysteine  2 mL Nebulization 4x daily     albuterol  2.5 mg Nebulization 4x daily     amoxicillin-clavulanate  500 mg Per Feeding Tube QPM     amoxicillin-clavulanate  500 mg Per Feeding Tube Once per day on  Mon Wed Fri     [Held by provider] apixaban ANTICOAGULANT  5 mg Oral or Feeding Tube BID     B and C vitamin Complex with folic acid  5 mL Per Feeding Tube Daily     carboxymethylcellulose PF  1 drop Both Eyes TID     chlorhexidine  15 mL Mouth/Throat BID     epoetin mirta-epbx  10,000 Units Subcutaneous Once per day on Mon Wed Fri     fluconazole  200 mg Per Feeding Tube At Bedtime     folic acid  1 mg Oral or Feeding Tube Daily     heparin Lock (1000 units/mL High concentration)  1,900 Units Intracatheter During Dialysis/CRRT (from stock)     heparin Lock (1000 units/mL High concentration)  1,900 Units Intracatheter During Dialysis/CRRT (from stock)     insulin aspart  1-6 Units Subcutaneous Daily     insulin glargine  20 Units Subcutaneous Daily     lacosamide  100 mg Oral or Feeding Tube BID     lactulose  20 g Oral or Feeding Tube BID     levETIRAcetam  1,000 mg Oral or Feeding Tube Q24H     Melatonin  6 mg Oral or NG Tube At Bedtime     midodrine  5 mg Oral or Feeding Tube TID w/meals     midodrine  5 mg Oral During Dialysis/CRRT (from stock)     mineral oil-hydrophilic petrolatum   Topical Daily     nystatin  500,000 Units Swish & Swallow 4x Daily     pantoprazole  40 mg Per Feeding Tube BID     QUEtiapine  75 mg Oral or Feeding Tube At Bedtime     rifaximin  550 mg Per Feeding Tube BID     sodium chloride (PF)  10-30 mL Intracatheter Q8H     tacrolimus  1 mg Oral or Feeding Tube BID

## 2023-01-05 NOTE — PLAN OF CARE
Goal Outcome Evaluation:      Plan of Care Reviewed With: patient    Overall Patient Progress: no changeOverall Patient Progress: no change       Problem: Pain Acute  Goal: Optimal Pain Control and Function  Outcome: Progressing       Problem: Artificial Airway  Goal: Effective Communication  Intervention: Ensure Effective Communication  Recent Flowsheet Documentation  Taken 1/5/2023 1700 by Radha Sanchez, RN  Trust Relationship/Rapport: care explained  Taken 1/5/2023 0815 by Radha Sanchez RN  Trust Relationship/Rapport: care explained     Patient is alert and oriented. Current on trach dome. C/o abdominal pain 5/10 and prn given with effect.  Continues with telemetry per orders. Blood sugar check. Tube feeding continuous. Up in w/c x2 this shift. Rectal tube intact and with some leaking noted. Vitals stable. Patient appears to be restless this evening and prn seroquel administered with effectiveness.  Family visited this shift. Vitals stable.

## 2023-01-05 NOTE — PROGRESS NOTES
Pulmonary Progress Note    Admit Date: 12/29/2022  CODE: Full Code    Assessment/Plan:   57yoM with liver transplant(2016), recent prolonged hospitalization 11/12-12/15 for PEA cardiac arrest, Strep/Parainfluenza pneumonia with ARDS, MODS, right PTX requiring chest tube since removed.  Now HD dependent, s/p trach/PEG.  Discharged to LTAC, sent out the next day with new left PTX & GIB.  Recent hospitalization c/b GIB(resolved), PEA arrest and seizure on 12/20.  Left CT removed on 12/16.  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  Transferred back to LTAC 12/29.     Discussion of pertinent problems:  Acute hypoxic/hypercapnic respiratory failure s/p trach: multifactorial related to pneumonia(treated), ?aspergillus pna s/p 1 week of voriconazole, ARDS(resolved), b/l pneumothoraces(chest tubes removed), b/l pleural effusions & atelectasis(stable), mucus plugging, & pleural fluid cx growing Candida.  Progressing slowly with trach dome trials and AC at night as he is severely deconditioned.  CxR 1/4 with  no significant changes from previous, no recurrent PTX.      Tracheostomy in place: 8 Shiley placed 11/29.  Changed to 6 Shiley prox xlt 12/8, unclear why     Dysphagia s/p PEG tube.  BDT 12/30 with no immediate but moderate delayed aspiration 45min later    Candida in left pleural fluid cultures: 4 weeks of antimicrobial started on Micafungin switched to fluconazole 1/3 based on cx sensitivities.  Plan for ~4 week total course    LORETTA on MWF HD: followed by Nephrology     S/p Liver transplant with post transplant cirrhosis & ascites s/p paracentesis 12/22: on [place] and prednisone    CHRISTIAN on home BiPAP     lactobacillus peritonitis on Unasyn transitioned to PO Augmentin.  Duration per ID    Seizure after hypoxic event on 12/20 with subsequent seizure 12/21 during dialysis: Keppra and vimpat  indefinitely     Multifocal acute ischemic strokes on MRI(12/20).  AC was on hold d/t GIB and left hemothorax, now resumed    Hx b/l pneumothoraces: right CT first placed 11/16, removed, then replaced by IR on 12/12, removed again 12/19.  Left chest tube placed 12/16, ?hemithorax, CT removed on 12/19    Hx mucus plugging of bronchi.  Bronch on 12/20 with minimal secretions.  Hypoxia induced mucus plug suctioned this morning     Anemia d/t chronic illness, renal failure, cirrhosis: s/p 1 unit PRBCs 1/4 for Hgb 6.3.  Intermittent bloody tracheal secretions.  Apixaban held 1/4 by primary     Plan:   - Phase 2 weans: TM/cuff up during the day with AC night.  Extend TM slowly into the night   - VBG at end of wean today   - PMV trials with SLP  - Will consider trach downsize to 7 Bivona this week, but holding currently given recent mucus plug and low Hgb s/p blood transfusion yesterday.  Repeat Hgb pending   - AC currently on hold - monitor closely   - Bronchial hygiene with Albuterol + mucomyst nebs + metanebs QID.    - Fluid removal with dialysis - to keep on dry side  - Cont Fluconazole for candida isolation in left pleural cultures    Henry Fenton, SHAWN  Pulmonary Medicine  [facility]  Pager [phone]    Subjective/Interim Events:   - hypoxia and orthopnea overnight on trach dome, improved when placed back on vent  - denies acute dyspnea, pain, n/v, fever, chills      Tracheal secretions:  Overnight - x3, mod, thick, blood tinged  Yesterday - x8, sm/lg, thick, pink tinged    Cough strength: moderate    Current phase of ventilator weaning pathway:  Phase 2    Ventilator weaning results   12/29: PS 12/5 for 9hr  12/30-1/1: TM 3hr x2, otherwise PS 8/5 day, AC night   1/2: TM/cuff up for 13.5hr.  AC night   1/3: 35L/35%TM for 13hr  1/4: 35L/35%TM for 11hr    Clinical status discussed today with respiratory therapist, patient, hospitalist    Medications:       dextrose       - MEDICATION INSTRUCTIONS -          sodium chloride 0.9%  300 mL Hemodialysis Machine During Dialysis/CRRT (from stock)     acetaminophen  975 mg Per Feeding Tube Q8H BIJU    Or     acetaminophen  650 mg Rectal Q8H BIJU     acetylcysteine  2 mL Nebulization 4x daily     albuterol  2.5 mg Nebulization 4x daily     amoxicillin-clavulanate  500 mg Per Feeding Tube QPM     amoxicillin-clavulanate  500 mg Per Feeding Tube Once per day on Mon Wed Fri     [Held by provider] apixaban ANTICOAGULANT  5 mg Oral or Feeding Tube BID     B and C vitamin Complex with folic acid  5 mL Per Feeding Tube Daily     carboxymethylcellulose PF  1 drop Both Eyes TID     chlorhexidine  15 mL Mouth/Throat BID     epoetin mirta-epbx  10,000 Units Subcutaneous Once per day on Mon Wed Fri     fluconazole  200 mg Per Feeding Tube At Bedtime     folic acid  1 mg Oral or Feeding Tube Daily     heparin Lock (1000 units/mL High concentration)  1,900 Units Intracatheter During Dialysis/CRRT (from stock)     heparin Lock (1000 units/mL High concentration)  1,900 Units Intracatheter During Dialysis/CRRT (from stock)     insulin aspart  1-6 Units Subcutaneous Daily     insulin glargine  20 Units Subcutaneous Daily     lacosamide  100 mg Oral or Feeding Tube BID     lactulose  20 g Oral or Feeding Tube BID     levETIRAcetam  1,000 mg Oral or Feeding Tube Q24H     Melatonin  6 mg Oral or NG Tube At Bedtime     midodrine  5 mg Oral or Feeding Tube TID w/meals     midodrine  5 mg Oral During Dialysis/CRRT (from stock)     mineral oil-hydrophilic petrolatum   Topical Daily     nystatin  500,000 Units Swish & Swallow 4x Daily     pantoprazole  40 mg Per Feeding Tube BID     QUEtiapine  75 mg Oral or Feeding Tube At Bedtime     rifaximin  550 mg Per Feeding Tube BID     sodium chloride (PF)  10-30 mL Intracatheter Q8H     tacrolimus  1 mg Oral or Feeding Tube BID         Exam/Data:   Vitals  /75 (BP Location: Left arm)   Pulse 99   Temp 97.8  F (36.6  C) (Oral)   Resp 22   Wt 94.7 kg (208  lb 12.8 oz)   SpO2 100%   BMI 28.32 kg/m    BP - Mean:  [] 91  I/O last 3 completed shifts:  In: 1851 [NG/GT:1480]  Out: 1900 [Other:1900]  Weight change: 0.907 kg (2 lb)    Vent Mode: Trach collar  FiO2 (%): 26 %  Resp Rate (Set): 12 breaths/min  Tidal Volume (Set, mL): 450 mL  PEEP (cm H2O): 5 cmH2O  Resp: 22      EXAM:  Gen: no distress in chair on trach dome but appears fatigued  HEENT: NT, trach midline/intact, significant peristomal ecchymosis, cuff up   CV: RRR  Resp: CTAB; non-labored   Abd: large, soft, nontender, BS hypoactive  Skin: no visible rashes or lesions, scattered ecchymosis, fragile   Ext: mild pedal edema, weak  Neuro: alert, follows commands, mouths words     ROS:  A 10-system review was obtained and is negative with the exception of the symptoms noted above.    Labs:  Basic Metabolic Panel  Recent Labs   Lab 01/05/23  0802 01/04/23  0733 01/04/23  0629 01/03/23  0934 01/02/23  0813 01/02/23  0527 01/01/23  0759 01/01/23  0626 12/31/22  0826 12/31/22  0610 12/30/22  1628 12/30/22  1454 12/30/22  0817 12/30/22  0651   NA  --   --   --   --   --  134*  --  142  --  137  --   --   --  142   POTASSIUM  --   --   --   --   --  3.7  --  3.7  --  3.7  --  4.1  --  3.4   CHLORIDE  --   --   --   --   --  95*  --  101  --  99  --   --   --  102   CO2  --   --   --   --   --  29  --  30*  --  29  --   --   --  30*   BUN  --   --   --   --   --  74.2*  --  61.1*  --  44.1*  --   --   --  61.0*   CR  --   --   --   --   --  3.06*  --  2.72*  --  2.23*  --   --   --  3.02*   * 166* 129* 143*   < > 154*   < > 162*   < > 150*   < >  --    < > 134*    < > = values in this interval not displayed.     Liver Function Tests  Recent Labs   Lab 01/02/23  0527 01/01/23  0626 12/31/22  0610 12/30/22  0651   ALBUMIN 2.2* 2.4* 2.2* 2.2*     CBC RESULTS: Recent Labs   Lab Test 01/04/23  0623 12/27/22  0420   WBC  --  7.5   RBC  --  2.76*   HGB 6.3* 8.8*   HCT  --  29.4*   MCV  --  107*   MCH  --  31.9    MCHC  --  29.9*   RDW  --  18.7*   PLT 58* 63*         RADIOLOGY: Personally reviewed; radiology read below   XR CHEST PORT 1 VIEW  LOCATION: [facility]  DATE/TIME: 1/4/2023 9:21 AM     INDICATION: hypoxic resp failure; intermittent bloody trach secretions; orthopena; hx b l pleural effusions and b l PTX  COMPARISON: Portable AP view the chest 12/27/2022; CT pulmonary angiogram 12/20/2022                                                                      IMPRESSION:      Right PICC terminates in the middle third of the SVC. Tunneled right jugular approach dialysis catheter terminates in the right atrium. Tracheostomy resides within the tracheal air column.     Persistent shallow lung inflation. Territories of bibasilar atelectasis are similar. Small right pleural effusion visible in the right lateral sulcus and layering into the interlobar fissures is similar. No enlarging pleural effusion. No pneumothorax.     Unchanged heart and mediastinal contours. Unchanged oblique interface just lateral to the dialysis catheter/SVC and corresponds to rounded atelectasis on prior CT.  --------------  XR CHEST 12/27/2022     HISTORY: Shortness of breath, mucous plug?     COMPARISON: Chest radiograph 12/24/2022                                                                      IMPRESSION: Stable cardiomediastinal silhouette with unchanged  position of right upper extremity PICC and dialysis catheter.  Tracheostomy tube tip overlies the midthoracic trachea. Persistent  bilateral pleural effusions with associated bibasilar atelectasis. No  new airspace consolidation or lobar atelectasis. No pneumothorax.  Bones are unchanged. Percutaneous gastrostomy tube partially  visualized.

## 2023-01-05 NOTE — PLAN OF CARE
Problem: Mechanical Ventilation Invasive  Goal: Effective Communication  Outcome: Progressing  Goal: Optimal Device Function  Outcome: Progressing  Goal: Mechanical Ventilation Liberation  Outcome: Progressing     RT PROGRESS NOTE     DATA:     CURRENT SETTINGS: Vent Mode: CMV/AC  (Continuous Mandatory Ventilation/ Assist Control)  FiO2 (%): 24 %  Resp Rate (Set): 12 breaths/min  Tidal Volume (Set, mL): 450 mL  PEEP (cm H2O): 5 cmH2O  Resp: 26                 TRACH TYPE / SIZE: # shiley XLT proximal 11/29             MODE:                FIO2:   30%     ACTION:             THERAPIES:   MUCO BID and DUONEB QID             SUCTION:                           FREQUENCY:  X 3                        AMOUNT:  moderate with lavaged X 1                        CONSISTENCY:   thick                         COLOR:  blood tinged             SPONTANEOUS COUGH EFFORT/STRENGTH OF EFFORT (not elicited by suctioning):                               WEANING PHASE:   2                        WEAN MODE:   TM 35%/35L                         WEAN TIME:  11 hours and 11 minutes. Pt. tols well.                         END WEAN REASON:   full vent at night     RESPONSE:             BS:   cl             VITAL SIGNS:   Blood pressure 123/73, pulse 113, temperature 99.1  F (37.3  C), temperature source Oral, resp. rate 26, weight 94.7 kg (208 lb 12.8 oz), SpO2 96 %.               EMOTIONAL NEEDS / CONCERNS:                  RISK FOR SELF DECANNULATION:                          RISK DUE TO:                          INTERVENTION/S IN PLACE IS/ARE:         NOTE / PLAN:  Pt. Pulled vent off vent tube about 2-3 times. May restrained if that continue issues.   01/04/23 1200  Intrapulmonary Percussive Therapy  4 TIMES DAILY RT      Order Class: Hospital Performed   Question: Therapies: Answer: Airway Clearance alternating CHFO & CPAP    01/04/23 0954   01/02/23 1330  Ventilator weaning phase 2  ONE TIME     Complete     Comments: TM/cuff up during day  as tolerated with AC night.  PMV with SLP   Order Class: Hospital Performed

## 2023-01-05 NOTE — PROCEDURES
Hemodialysis Treatment Note    Pre treatment report from Radha Sanchez RN.  Consent verified, dated [id number].      Assessment:  Patient is alert and oriented, communicates by mouthing words and writing.  Patient  receiving transfusion of PRBC 1 unit prior to dialysis treatment initiation.  Apical pulse rhythm regular, rate in low 100s.  Patient has trach dome, lung sounds coarse. Patient's skin warm, bruised and petechiae, generalized and extremity edema and firm, distended abdomen.      Pre weight: 94.7 KG (bed scale)  Post weight:  Patient declined/refused    Run length:  3 hours    Dialyzer/rinse:  Optiflux 160, rinse:  mildly streaked    Total fluid removed (ml):  2500 mls, with prime, rinse back and NS flush, net fluid vmrxyei=9708 mls    Blood Volume Processed:  55L    Pre Treatment Vascular Access:  R tunnel CVC, site care and dressing change performed, no s/s of infection.  Redressed site with Biopatch and Tegaderm dressing.  Limbs prepped per protocol, both aspirate and flush well, treatment performed with lines in normal configuration, XDR=947-803 mls/min, some arterial collapsing/positional arterial limb.      Treatment Summary:  Patient completed PRBC transfusion during dialysis treatment, tolerated well.  Patient treated on K3 bath per protocol order, last resulted potassium=3.7.  No heparin administered as part of dialysis treatment with exception of CVC dwell post treatment.  Patient tachycardic pre, during and post dialysis.  BP stable.  Post treatment report provided to Marcellus Flores RN.  For further details, please see Epic dialysis flow sheet and MAR.      Interventions:  -BP, pulse, respirations and oxygen saturations monitored, documented every 15  Minutes and PRN;  -Critline used for fluid monitoring/management, steep slope throughout treatment;  -Goal adjustment per Critline and hemodynamics;  -Staff nurse administered midodrine 5 mg early in dialysis treatment;  -NS flush in attempts to  improve arterial CVC functioning.      Post Treatment Vascular Access:  Dressing and Biopatch remain CDI.  NS flush to limbs, heparin 1000 units/ml dwell,   1900 units/limb of CVC.  Limbs capped, clamped, labeled and wrapped with 4x4 gauze.    Plan:  Per renal hemodialysis scheduled MWF    Shana Dowling, RN  [facility]

## 2023-01-05 NOTE — PLAN OF CARE
Problem: Mechanical Ventilation Invasive  Goal: Optimal Device Function  Outcome: Progressing  Goal: Mechanical Ventilation Liberation  Outcome: Progressing  Goal: Absence of Device-Related Skin and Tissue Injury  Outcome: Progressing  Goal: Absence of Ventilator-Induced Lung Injury  Outcome: Progressing   RT PROGRESS NOTE     DATA:     CURRENT SETTINGS: AC 12, 450, +5, 24% @Night             TRACH TYPE / SIZE: # 6 SHILEY  XLT  PROXIMAL Placed on 11/29/2022             MODE: AC             FIO2:  24%     ACTION:             THERAPIES: Albuterol /Mucomyst neb QID and MetaNeb TX QID             SUCTION:                           FREQUENCY: X6                        AMOUNT:Moderate to large                         CONSISTENCY: thick                        COLOR: white/bloody secretions              SPONTANEOUS COUGH EFFORT/STRENGTH OF EFFORT (not elicited by suctioning):Strong                              WEANING PHASE:  2                         WEAN MODE:26%@30 L/MIN TM/cuff AC @Night                        WEAN TIME: Since  09:00                         END WEAN REASON: Ongoing      RESPONSE:             BS: Clear Decreased              VITAL SIGNS:Sat 100%,HR  ,RR 22-24             EMOTIONAL NEEDS / CONCERNS:  no              RISK FOR SELF DECANNULATION:                          RISK DUE TO:                          INTERVENTION/S IN PLACE IS/ARE:     NOTE / PLAN: Cont . Current plan of care/TM/cuff up during day as tolerated with AC night.  PMV with SLP.  Extend TM into the night slowly, goal today ~14hr- VBG  at end of TM before placing back on vent

## 2023-01-05 NOTE — PROGRESS NOTES
[facility]  WO Nurse Inpatient Assessment     Consulted for: trach site, peg tube site    Patient History (according to provider note(s):      Per ICU H+P at previous facility:  57 year old male with paroxysmal A. fib, Liver transplant (2016), CHRISTIAN on BiPAP, h/o CVA and hypertension. Patient  s/p Trach/PEG following acu had Rt sided Chest tube placed for hydroptx on 12/12/22 and  transferred to [place] on 12/14/22 for CIP.  Pt  noted to have bloody stool on 12/15 and 12/16 and had left Ptx which required left sided chest tube leading to ~900ml of bloody output. Patient with PEA arrest 12/20/22 and then seizure while on HD.  Overall stable and getting close to being ready for transfer back to LTAC.  The following problems were addressed during his hospitalization listed by organ system.     Areas Assessed:      Areas visualized during today's visit: Abdomen and Under/around medical device(s): trach, PEG    Skin Injury Location: presumed concern at PEG tube site  Last photo: NA  Minor scabbing noted arounf tube but otherwise intact    Trach site no concerns, continue Routine cares     Patient continues to have copious amount of ecchymosis on upper body, including shoulders and chest and back, left arm/elbow, purpura noted on abdomen    Interdry to moist groin folds    Dry skin feet and hands - ordered Aquaphor    Attention to Internal Fecal Management System   6 o'clock perianal minor denudement  Continue Viscopaste  Bypassing on assessment - nurse udated   Internal Fecal Management System Instructions: Record output and inspect skin at anal opening every shift. **Discontinue Internal Fecal Management System  if less than 200cc of output in a 24 hour period.**    Treatment Plan:     See above    Orders: Reviewed    RECOMMEND PRIMARY TEAM ORDER: None, at this time  Education provided: plan of care  Discussed plan of care with: Patient, Family and Nurse  WO nurse follow-up plan: weekly  Notify  WOC if wound(s) deteriorate.  Nursing to notify the Provider(s) and re-consult the WOC Nurse if new skin concern.    DATA:     Current support surface: Standard  Low air loss (MIQUEL pump, Isolibrium, Pulsate, skin guard, etc)  Containment of urine/stool: Internal fecal management  BMI: Body mass index is 28.32 kg/m .   Active diet order: None     Output: I/O last 3 completed shifts:  In: 1851 [NG/GT:1480]  Out: 1900 [Other:1900]     Labs:   Recent Labs   Lab 01/04/23  0623 01/02/23  0527   ALBUMIN  --  2.2*   HGB 6.3*  --      Pressure injury risk assessment:   Sensory Perception: 3-->slightly limited  Moisture: 3-->occasionally moist  Activity: 2-->chairfast  Mobility: 2-->very limited  Nutrition: 3-->adequate  Friction and Shear: 2-->potential problem  Kareem Score: 15    Ashleigh Pereira, ANANDN, RN, PHN, HNB-BC, CWOCN

## 2023-01-06 ENCOUNTER — APPOINTMENT (OUTPATIENT)
Dept: PHYSICAL THERAPY | Facility: CLINIC | Age: 58
DRG: 207 | End: 2023-01-06
Attending: HOSPITALIST
Payer: COMMERCIAL

## 2023-01-06 ENCOUNTER — APPOINTMENT (OUTPATIENT)
Dept: SPEECH THERAPY | Facility: CLINIC | Age: 58
DRG: 207 | End: 2023-01-06
Attending: HOSPITALIST
Payer: COMMERCIAL

## 2023-01-06 ENCOUNTER — APPOINTMENT (OUTPATIENT)
Dept: CT IMAGING | Facility: HOSPITAL | Age: 58
End: 2023-01-06
Attending: NURSE PRACTITIONER
Payer: COMMERCIAL

## 2023-01-06 LAB
ABO/RH(D): NORMAL
ANTIBODY SCREEN: NEGATIVE
BLD PROD TYP BPU: NORMAL
BLOOD COMPONENT TYPE: NORMAL
CODING SYSTEM: NORMAL
CROSSMATCH: NORMAL
GLUCOSE BLDC GLUCOMTR-MCNC: 120 MG/DL (ref 70–99)
HGB BLD-MCNC: 6.8 G/DL (ref 13.3–17.7)
ISSUE DATE AND TIME: NORMAL
PHOSPHATE SERPL-MCNC: 2.9 MG/DL (ref 2.5–4.5)
SPECIMEN EXPIRATION DATE: NORMAL
UNIT ABO/RH: NORMAL
UNIT NUMBER: NORMAL
UNIT STATUS: NORMAL
UNIT TYPE ISBT: 5100

## 2023-01-06 PROCEDURE — 94640 AIRWAY INHALATION TREATMENT: CPT | Mod: 76

## 2023-01-06 PROCEDURE — 120N000017 HC R&B RESPIRATORY CARE

## 2023-01-06 PROCEDURE — 258N000003 HC RX IP 258 OP 636: Performed by: NURSE PRACTITIONER

## 2023-01-06 PROCEDURE — 86923 COMPATIBILITY TEST ELECTRIC: CPT | Performed by: HOSPITALIST

## 2023-01-06 PROCEDURE — 90935 HEMODIALYSIS ONE EVALUATION: CPT

## 2023-01-06 PROCEDURE — 250N000009 HC RX 250: Performed by: NURSE PRACTITIONER

## 2023-01-06 PROCEDURE — 70498 CT ANGIOGRAPHY NECK: CPT

## 2023-01-06 PROCEDURE — 85018 HEMOGLOBIN: CPT | Performed by: HOSPITALIST

## 2023-01-06 PROCEDURE — 999N000157 HC STATISTIC RCP TIME EA 10 MIN

## 2023-01-06 PROCEDURE — 92507 TX SP LANG VOICE COMM INDIV: CPT | Mod: GN | Performed by: SPEECH-LANGUAGE PATHOLOGIST

## 2023-01-06 PROCEDURE — 250N000009 HC RX 250: Performed by: HOSPITALIST

## 2023-01-06 PROCEDURE — 94003 VENT MGMT INPAT SUBQ DAY: CPT | Performed by: NURSE PRACTITIONER

## 2023-01-06 PROCEDURE — 250N000013 HC RX MED GY IP 250 OP 250 PS 637: Performed by: HOSPITALIST

## 2023-01-06 PROCEDURE — 99233 SBSQ HOSP IP/OBS HIGH 50: CPT | Performed by: HOSPITALIST

## 2023-01-06 PROCEDURE — 250N000012 HC RX MED GY IP 250 OP 636 PS 637: Performed by: HOSPITALIST

## 2023-01-06 PROCEDURE — P9016 RBC LEUKOCYTES REDUCED: HCPCS | Performed by: HOSPITALIST

## 2023-01-06 PROCEDURE — 250N000013 HC RX MED GY IP 250 OP 250 PS 637: Performed by: NURSE PRACTITIONER

## 2023-01-06 PROCEDURE — 250N000011 HC RX IP 250 OP 636: Performed by: NURSE PRACTITIONER

## 2023-01-06 PROCEDURE — 250N000011 HC RX IP 250 OP 636: Performed by: HOSPITALIST

## 2023-01-06 PROCEDURE — 74174 CTA ABD&PLVS W/CONTRAST: CPT

## 2023-01-06 PROCEDURE — 84100 ASSAY OF PHOSPHORUS: CPT | Performed by: HOSPITALIST

## 2023-01-06 PROCEDURE — 999N000253 HC STATISTIC WEANING TRIALS

## 2023-01-06 PROCEDURE — 90935 HEMODIALYSIS ONE EVALUATION: CPT | Performed by: INTERNAL MEDICINE

## 2023-01-06 PROCEDURE — 94640 AIRWAY INHALATION TREATMENT: CPT

## 2023-01-06 PROCEDURE — 97110 THERAPEUTIC EXERCISES: CPT | Mod: GP

## 2023-01-06 PROCEDURE — 634N000001 HC RX 634: Performed by: PHYSICIAN ASSISTANT

## 2023-01-06 PROCEDURE — 94003 VENT MGMT INPAT SUBQ DAY: CPT

## 2023-01-06 PROCEDURE — 999N000009 HC STATISTIC AIRWAY CARE

## 2023-01-06 PROCEDURE — 86901 BLOOD TYPING SEROLOGIC RH(D): CPT | Performed by: HOSPITALIST

## 2023-01-06 RX ORDER — IOPAMIDOL 755 MG/ML
100 INJECTION, SOLUTION INTRAVASCULAR ONCE
Status: COMPLETED | OUTPATIENT
Start: 2023-01-07 | End: 2023-01-06

## 2023-01-06 RX ORDER — MIDODRINE HYDROCHLORIDE 5 MG/1
10 TABLET ORAL
Status: DISCONTINUED | OUTPATIENT
Start: 2023-01-06 | End: 2023-01-21 | Stop reason: HOSPADM

## 2023-01-06 RX ADMIN — WHITE PETROLATUM: 1.75 OINTMENT TOPICAL at 09:22

## 2023-01-06 RX ADMIN — LACTULOSE 20 G: 20 SOLUTION ORAL at 09:18

## 2023-01-06 RX ADMIN — LEVETIRACETAM 1000 MG: 100 SOLUTION ORAL at 21:31

## 2023-01-06 RX ADMIN — Medication 40 MG: at 21:46

## 2023-01-06 RX ADMIN — ALBUTEROL SULFATE 2.5 MG: 2.5 SOLUTION RESPIRATORY (INHALATION) at 16:58

## 2023-01-06 RX ADMIN — LACOSAMIDE 100 MG: 10 SOLUTION ORAL at 21:31

## 2023-01-06 RX ADMIN — QUETIAPINE 75 MG: 25 TABLET ORAL at 21:34

## 2023-01-06 RX ADMIN — ACETYLCYSTEINE 2 ML: 200 SOLUTION ORAL; RESPIRATORY (INHALATION) at 19:10

## 2023-01-06 RX ADMIN — NYSTATIN 500000 UNITS: 100000 SUSPENSION ORAL at 17:53

## 2023-01-06 RX ADMIN — HEPARIN SODIUM 1900 UNITS: 1000 INJECTION INTRAVENOUS; SUBCUTANEOUS at 10:43

## 2023-01-06 RX ADMIN — MIDODRINE HYDROCHLORIDE 5 MG: 5 TABLET ORAL at 12:20

## 2023-01-06 RX ADMIN — Medication 1 DROP: at 21:34

## 2023-01-06 RX ADMIN — ACETYLCYSTEINE 2 ML: 200 SOLUTION ORAL; RESPIRATORY (INHALATION) at 16:58

## 2023-01-06 RX ADMIN — Medication 40 MG: at 09:18

## 2023-01-06 RX ADMIN — ACETAMINOPHEN 650 MG: 650 SOLUTION ORAL at 12:19

## 2023-01-06 RX ADMIN — Medication 1 DROP: at 09:22

## 2023-01-06 RX ADMIN — LACOSAMIDE 100 MG: 10 SOLUTION ORAL at 12:19

## 2023-01-06 RX ADMIN — ACETAMINOPHEN 975 MG: 325 TABLET, FILM COATED ORAL at 21:30

## 2023-01-06 RX ADMIN — FLUCONAZOLE 200 MG: 200 TABLET ORAL at 22:17

## 2023-01-06 RX ADMIN — ALBUTEROL SULFATE 2.5 MG: 2.5 SOLUTION RESPIRATORY (INHALATION) at 08:41

## 2023-01-06 RX ADMIN — Medication 6 MG: at 21:34

## 2023-01-06 RX ADMIN — RIFAXIMIN 550 MG: 550 TABLET ORAL at 22:18

## 2023-01-06 RX ADMIN — TACROLIMUS 1 MG: 5 CAPSULE ORAL at 17:58

## 2023-01-06 RX ADMIN — ACETYLCYSTEINE 2 ML: 200 SOLUTION ORAL; RESPIRATORY (INHALATION) at 12:17

## 2023-01-06 RX ADMIN — MIDODRINE HYDROCHLORIDE 5 MG: 5 TABLET ORAL at 17:53

## 2023-01-06 RX ADMIN — AMOXICILLIN AND CLAVULANATE POTASSIUM 500 MG: 400; 57 POWDER, FOR SUSPENSION ORAL at 12:30

## 2023-01-06 RX ADMIN — RIFAXIMIN 550 MG: 550 TABLET ORAL at 12:20

## 2023-01-06 RX ADMIN — NYSTATIN 500000 UNITS: 100000 SUSPENSION ORAL at 09:17

## 2023-01-06 RX ADMIN — LACTULOSE 20 G: 20 SOLUTION ORAL at 21:36

## 2023-01-06 RX ADMIN — INSULIN GLARGINE 20 UNITS: 100 INJECTION, SOLUTION SUBCUTANEOUS at 09:22

## 2023-01-06 RX ADMIN — Medication 1 DROP: at 14:59

## 2023-01-06 RX ADMIN — IOPAMIDOL 100 ML: 755 INJECTION, SOLUTION INTRAVENOUS at 23:40

## 2023-01-06 RX ADMIN — FOLIC ACID 1 MG: 1 TABLET ORAL at 09:17

## 2023-01-06 RX ADMIN — TACROLIMUS 1 MG: 5 CAPSULE ORAL at 09:17

## 2023-01-06 RX ADMIN — CHLORHEXIDINE GLUCONATE 0.12% ORAL RINSE 15 ML: 1.2 LIQUID ORAL at 09:17

## 2023-01-06 RX ADMIN — ACETAMINOPHEN 975 MG: 325 TABLET, FILM COATED ORAL at 14:58

## 2023-01-06 RX ADMIN — ALBUTEROL SULFATE 2.5 MG: 2.5 SOLUTION RESPIRATORY (INHALATION) at 12:17

## 2023-01-06 RX ADMIN — AMOXICILLIN AND CLAVULANATE POTASSIUM 500 MG: 400; 57 POWDER, FOR SUSPENSION ORAL at 21:46

## 2023-01-06 RX ADMIN — ACETAMINOPHEN 975 MG: 325 TABLET, FILM COATED ORAL at 06:34

## 2023-01-06 RX ADMIN — CHLORHEXIDINE GLUCONATE 0.12% ORAL RINSE 15 ML: 1.2 LIQUID ORAL at 21:32

## 2023-01-06 RX ADMIN — ACETYLCYSTEINE 2 ML: 200 SOLUTION ORAL; RESPIRATORY (INHALATION) at 08:41

## 2023-01-06 RX ADMIN — NYSTATIN 500000 UNITS: 100000 SUSPENSION ORAL at 21:31

## 2023-01-06 RX ADMIN — EPOETIN ALFA-EPBX 10000 UNITS: 10000 INJECTION, SOLUTION INTRAVENOUS; SUBCUTANEOUS at 17:54

## 2023-01-06 RX ADMIN — ALBUTEROL SULFATE 2.5 MG: 2.5 SOLUTION RESPIRATORY (INHALATION) at 19:09

## 2023-01-06 RX ADMIN — HEPARIN SODIUM 1900 UNITS: 1000 INJECTION INTRAVENOUS; SUBCUTANEOUS at 10:44

## 2023-01-06 RX ADMIN — Medication 5 ML: at 09:17

## 2023-01-06 RX ADMIN — SODIUM CHLORIDE 300 ML: 9 INJECTION, SOLUTION INTRAVENOUS at 07:53

## 2023-01-06 RX ADMIN — MIDODRINE HYDROCHLORIDE 5 MG: 5 TABLET ORAL at 07:47

## 2023-01-06 ASSESSMENT — ACTIVITIES OF DAILY LIVING (ADL)
ADLS_ACUITY_SCORE: 62

## 2023-01-06 NOTE — PLAN OF CARE
Problem: Mechanical Ventilation Invasive  Goal: Effective Communication  Outcome: Progressing  Goal: Optimal Device Function  Outcome: Progressing  Goal: Mechanical Ventilation Liberation  Outcome: Progressing  Goal: Absence of Device-Related Skin and Tissue Injury  Outcome: Progressing  Goal: Absence of Ventilator-Induced Lung Injury  Outcome: Progressing   RT PROGRESS NOTE     DATA:     CURRENT SETTINGS:             TRACH TYPE / SIZE: # shiley XLT proximal 11/29             MODE:   AC 12/450 +5              FIO2:   24%     ACTION:             THERAPIES:   MUCO BID and DUONEB QID             SUCTION:                           FREQUENCY:   2                        AMOUNT:   small                         CONSISTENCY:   thick                         COLOR:   tan             SPONTANEOUS COUGH EFFORT/STRENGTH OF EFFORT (not elicited by suctioning):                               WEANING PHASE:  2                        WEAN MODE:   26%TM                        WEAN TIME:  wean 14hrs and 30mins yesterday                         END WEAN REASON:   full vent at night     RESPONSE:             BS:   cl             VITAL SIGNS:   Blood pressure 99/67, pulse 106, temperature 98.4  F (36.9  C), temperature source Oral, resp. rate 22, weight 94.7 kg (208 lb 12.8 oz), SpO2 95 %.               EMOTIONAL NEEDS / CONCERNS:                  RISK FOR SELF DECANNULATION:                          RISK DUE TO:                          INTERVENTION/S IN PLACE IS/ARE:         NOTE / PLAN:  suctioned large amount of bloody secretions on trach.

## 2023-01-06 NOTE — PROGRESS NOTES
Renal progress note  CC: LORETTA likely progressed to ESRD  Assessment and Plan:  57 YOM EtOH cirrhosis , SP liver tx 2016, on IHD for LORETTA likely progressed to ESRD since 11/29/2022, recent prolonged hosp for PEA arrest 11/15/2022 to 12/15/2022 , cb influenza and bact PNA, sp Trach and PEG and had a second PEA arrest 12/20 with seizures , cb pleural effusions (fungal infection), and recurrent ascites , CSF + for HHV 6, now at [facility] , nephrology following for ESRD management.    Anuric LORETTA likely ESRD now  requiring dialysis: After PEA arrest, was on CRRT which was discontinued 11/26/2022 and now started on intermittent hemodialysis ongoing since 11/29/2022.  He is maintained on a MWF schedule.  No signs of renal recovery yet.    -Attempted diuretic challenge and bladder scans->remains anuric and torsemide was discontinued 1/2  -Aim to keep net negative.  Target UF 2-3kg as BP allows  -Midodrine to support blood pressure and UF.   -MWF HD at [facility]     Anemia of chronic renal failure:   Hematochezia   Left hemothorax.    Erythropoietin 10,000 units 3 times weekly on dialysis days->continue, will plan to increase if needed next wk  Hgb ~8s , stable after recent bleed     Chronic hypotension: Likely due to chronic liver disease, improving.  on midodrine to 5mg TID with parameters to hold for SBP>110.  may need to increase if hemodynamics do not support UF  Will hold off albumin for now     Hypokalemia: Needing intermittent replacement.    He is higher K bath with HD.     Acute on chronic hypoxic respiratory failure: S/p tracheostomy 11/29/2022.  Complicated by left hemopneumothorax s/p left chest tube.  History of aspergillus PNA and multiple bacterial PNA. On micafungin.  Mgmt per pulm      CARRILLO cirrhosis s/p liver transplant: Immunosuppression per GI.  Tacrolimus and prednisone.  Paracentesis as indicated per GI.  --> likely needs para in coming days     HHV-6 meningeal encephalitis:  Seizures:  LP showing  HHV6 12/21/22.  Initial seizure after PEA arrest, then another seizure again 12/21/2022 during dialysis session.  Initially on acyclovir and then switched over to ganciclovir, since been discontinued.  On Keppra and Vimpat.  Management per neurology and ID    Thank you for the consultation we will follow  Leesa Hernández MD  [facility]  [phone]      Subjective  Seen on HD  Had some lower BP pre tx and then improved with tx and tolerated a 2.4L UF   Reports some abd distention  Reviewed with primary team , will likely need para in coming days    Objective    Vital signs in last 24 hours  Temp:  [98.1  F (36.7  C)-98.5  F (36.9  C)] 98.5  F (36.9  C)  Pulse:  [] 94  Resp:  [18-25] 24  BP: ()/(57-68) 98/66  Cuff Mean (mmHg):  [72] 72  FiO2 (%):  [24 %-30 %] 24 %  SpO2:  [95 %-100 %] 99 %  Weight:   [unfilled]    Intake/Output last 3 shifts  I/O last 3 completed shifts:  In: 1862 [NG/GT:1862]  Out: -   Intake/Output this shift:  No intake/output data recorded.    Physical Exam  Alert awake, NAD  CV: RRR without murmur or rub  Lung: clear and equal; no extra sounds  Ab: soft and NT; Mild distended; normal bs  Ext: no edema and well perfused  Skin; no rash    Pertinent Labs   Lab Results   Component Value Date    WBC 7.5 12/27/2022    HGB 6.8 (LL) 01/06/2023    HCT 29.4 (L) 12/27/2022     (H) 12/27/2022    PLT 58 (L) 01/04/2023     Lab Results   Component Value Date    BUN 74.2 (H) 01/02/2023     01/05/2023    CO2 29 01/02/2023       Lab Results   Component Value Date    ALBUMIN 2.2 (L) 01/02/2023     Lab Results   Component Value Date    PHOS 2.9 01/06/2023     I reviewed all lab results  Leesa Hernández MD

## 2023-01-06 NOTE — PLAN OF CARE
Goal Outcome Evaluation:Patient Hgb this am was 6.8. Transfusion of one unit of blood is ongoing, no transfusion reaction noted so far, Will continue to monitor.

## 2023-01-06 NOTE — PROGRESS NOTES
CLINICAL NUTRITION SERVICES - REASSESSMENT NOTE     Nutrition Prescription    RECOMMENDATIONS FOR MDs/PROVIDERS TO ORDER:      Malnutrition Status:    Previously noted severe    Recommendations already ordered by Registered Dietitian (RD):      Continue enteral feeding regimen as ordered     Education Needs: n/a at this time    Future/Additional Recommendations:     Progress toward goals will be monitored and evaluated per protocol.     EVALUATION OF PROGRESS TOWARD GOALS/NEW FINDINGS   Progress towards goals will be monitored and evaluated per protocol and Practice Guidelines      Diet order: None   Enteral : via PEG placed 22:  CONTINUOUS, Starting on Thu 22 at 1441, Until Specified  Adult Formula: Novasource Renal  Route: Gastrostomy  Goal Rate: 60  Goal Units: mL/hr  Medication - Feeding Tube Flush Frequency: At least 15-30 mL water before and after medication administration and with tube clogging  Free Water Flush: 30 mL every 4 hrs  =1440 ml formula,2880 kcal , 131 g protein , 264 g CHO, 0 g fiber, 1032 mL free  H2O + 180 ml from flushes =1212 ml fluid  Total EN volume received:  5 day average: 1397mL,  >90% of prescribed volume recieved      Last BM per nursin23 'watery'. Per nursing on 23  RT intact no output noted per I/0 since 23    Skin: pressure ulcer noted per nursing- on nare. DTI lip tear, scrotal and abdominal lacerations     Kraeem nutrition score: 3; total score: 14    I/O last 3 completed shifts:    In: 1862 [NG/GT:1862]    Out: -     Labs:    Electrolytes  Potassium (mmol/L)   Date Value   2023 3.7   2023 3.7   2022 3.7   2022 3.4   2022 3.7   2022 3.7   2020 3.9   10/07/2019 4.3   2019 4.2     Potassium POCT (mmol/L)   Date Value   2023 3.5     Phosphorus (mg/dL)   Date Value   2023 2.9   2023 2.5   2023 3.6   2023 2.2 (L)   2023 3.2   10/17/2017 3.4   2017 3.5   2017 3.3    02/03/2017 4.0   12/26/2016 4.2        Blood Glucose  Glucose (mg/dL)   Date Value   12/29/2022 160 (H)   12/28/2022 117 (H)   12/27/2022 127 (H)   12/26/2022 231 (H)   12/24/2022 228 (H)   04/20/2020 93   10/07/2019 102 (H)   02/20/2019 95   07/10/2018 89   10/31/2017 97     GLUCOSE BY METER POCT (mg/dL)   Date Value   01/06/2023 120 (H)   01/05/2023 138 (H)   01/04/2023 166 (H)   01/04/2023 129 (H)   01/03/2023 143 (H)     Glucose Whole Blood POCT (mg/dL)   Date Value   01/05/2023 121     Hemoglobin A1C (%)   Date Value   11/19/2022 4.7   09/22/2016 4.8        Inflammatory Markers  CRP Inflammation (mg/L)   Date Value   11/22/2022 22.3 (H)     WBC (10e9/L)   Date Value   04/20/2020 4.0   10/07/2019 7.7   02/20/2019 3.9 (L)     WBC Count (10e3/uL)   Date Value   12/27/2022 7.5   12/26/2022 4.8   12/24/2022 5.8     Albumin (g/dL)   Date Value   01/02/2023 2.2 (L)   01/01/2023 2.4 (L)   12/31/2022 2.2 (L)   12/29/2022 1.8 (L)   12/28/2022 1.7 (L)   12/27/2022 2.1 (L)   04/20/2020 3.9   10/07/2019 3.8   02/20/2019 3.8        Magnesium (mg/dL)   Date Value   01/02/2023 1.8   12/26/2022 2.0   12/24/2022 1.9   10/17/2017 1.5 (L)   09/19/2017 1.9   07/18/2017 1.7     Sodium (mmol/L)   Date Value   01/02/2023 134 (L)   01/01/2023 142   12/31/2022 137   04/20/2020 139   10/07/2019 131 (L)   02/20/2019 137     Sodium POCT (mmol/L)   Date Value   01/05/2023 136        Renal  Urea Nitrogen (mg/dL)   Date Value   01/02/2023 74.2 (H)   01/01/2023 61.1 (H)   12/31/2022 44.1 (H)   12/29/2022 46 (H)   12/28/2022 62 (H)   12/27/2022 52 (H)   04/20/2020 23   10/07/2019 18   02/20/2019 20     Creatinine (mg/dL)   Date Value   01/02/2023 3.06 (H)   01/01/2023 2.72 (H)   12/31/2022 2.23 (H)   04/20/2020 1.06   10/07/2019 1.01   02/20/2019 1.08         Additional  Triglycerides (mg/dL)   Date Value   12/21/2022 106   11/20/2022 94   04/20/2020 192 (H)   10/31/2017 92   03/01/2016 82     Triglycerides (External) (mg/dL)   Date Value    08/01/2019 177 (H)   01/08/2019 135     Ketones Urine (mg/dL)   Date Value   11/25/2022 Negative   10/31/2017 5 (A)        Meds:    sodium chloride 0.9%  300 mL Hemodialysis Machine During Dialysis/CRRT (from stock)     acetaminophen  975 mg Per Feeding Tube Q8H BIJU    Or     acetaminophen  650 mg Rectal Q8H BIJU     acetylcysteine  2 mL Nebulization 4x daily     albuterol  2.5 mg Nebulization 4x daily     amoxicillin-clavulanate  500 mg Per Feeding Tube QPM     amoxicillin-clavulanate  500 mg Per Feeding Tube Once per day on Mon Wed Fri     [Held by provider] apixaban ANTICOAGULANT  5 mg Oral or Feeding Tube BID     B and C vitamin Complex with folic acid  5 mL Per Feeding Tube Daily     carboxymethylcellulose PF  1 drop Both Eyes TID     chlorhexidine  15 mL Mouth/Throat BID     epoetin mirta-epbx  10,000 Units Subcutaneous Once per day on Mon Wed Fri     fluconazole  200 mg Per Feeding Tube At Bedtime     folic acid  1 mg Oral or Feeding Tube Daily     heparin Lock (1000 units/mL High concentration)  1,900 Units Intracatheter During Dialysis/CRRT (from stock)     heparin Lock (1000 units/mL High concentration)  1,900 Units Intracatheter During Dialysis/CRRT (from stock)     insulin aspart  1-6 Units Subcutaneous Daily     insulin glargine  20 Units Subcutaneous Daily     lacosamide  100 mg Oral or Feeding Tube BID     lactulose  20 g Oral or Feeding Tube BID     levETIRAcetam  1,000 mg Oral or Feeding Tube Q24H     Melatonin  6 mg Oral or NG Tube At Bedtime     midodrine  10 mg Oral During Dialysis/CRRT (from stock)     midodrine  5 mg Oral or Feeding Tube TID w/meals     mineral oil-hydrophilic petrolatum   Topical Daily     nystatin  500,000 Units Swish & Swallow 4x Daily     pantoprazole  40 mg Per Feeding Tube BID     QUEtiapine  75 mg Oral or Feeding Tube At Bedtime     rifaximin  550 mg Per Feeding Tube BID     sodium chloride (PF)  10-30 mL Intracatheter Q8H     tacrolimus  1 mg Oral or Feeding Tube BID         dextrose       - MEDICATION INSTRUCTIONS -        sodium chloride 0.9%, acetaminophen **OR** acetaminophen, albuterol, alteplase, alteplase, artificial saliva, bisacodyl, dextrose, glucose **OR** dextrose **OR** glucagon, docusate sodium **OR** docusate, lidocaine, menthol-zinc oxide, miconazole, mineral oil-hydrophilic petrolatum, ondansetron, - MEDICATION INSTRUCTIONS -, QUEtiapine, sodium chloride (PF), sodium chloride (PF), zolpidem          ANTHROPOMETRICS  Height: 6'  Weight: 94 kg   Body mass index is 28.21 kg/m .  01/06/23 0558 94.3 kg (208 lb) Bed scale   01/04/23 1625 94.7 kg (208 lb 12.8 oz) Bed scale   01/04/23 0649 93.8 kg (206 lb 12.8 oz) Bed scale   01/03/23 0420 94.8 kg (209 lb) Bed scale   01/02/23 0521 95 kg (209 lb 8 oz) --   01/01/23 0556 95 kg (209 lb 6.4 oz) Bed scale   12/31/22 0450 93.3 kg (205 lb 11.2 oz) Bed scale   12/30/22 1716 92.1 kg (203 lb 0.7 oz) --     117 % IBW   Weight Status:  Overweight BMI 25-29.9  Weight has increased since last review, as noted above, pending changes in fluid b/t HD runs    ASSESSED NUTRITION NEEDS PER APPROVED PRACTICE GUIDELINES:  Dosing Weight 93.2 kg (previous current weight)  Estimated Energy Needs: 2800 -3200 kcals (30-35 Kcal/Kg)  Justification: repletion,increased needs  Estimated Protein Needs: 112-140 grams protein (1.2-1.5 g pro/Kg)  Justification: hypercatabolism with critical illness, dialysis, wound healing   Estimated Fluid Needs: per provider pending fluid status    NEW FINDINGS     Previous Goals   TF to meet % nutrition needs-progressing  Maintain dry weight-progressing  BG WNL- met  Evaluation: Met  New Goals:    N/a ( BG )    Previous Nutrition Diagnosis  Inadequate enteral nutrition infusion related to decompensated clinical status as evidenced by meeting <50% protein and calorie needs x9 days.     Malnutrition related to acute illness as evidenced by severe muscle wasting, subcutaneous fat wasting, meeting <50% needs >7  days, 13% weight loss in 2 months.  Evaluation: Improving  New Nutrition Dx:   n/a

## 2023-01-06 NOTE — PROGRESS NOTES
HD Progress Note: HD initiation at 07:53 am and terminated at 11:53 am.    Assessment: Pt AAO x4, with trach, lungs clear, trace pedal edema noted. Denied c/o.    Vascular Access: RIJ catheter patent, aspirated and flushed well, however art pressure collapsing at initiation and lines reversed. BFR at 400 with no issues. Dressing dry and intact, reinforced. Ports locked with Heparin post Tx.    Dialyzer/Rinse: 160NRe / lightly streaked    HD time: 3hrs    HD fluid removal goal: 2-3kg    Treatment: Pt started out hypotensive. Received Midodrine. BP improved and able to increase the goal from 1 to 2.4 kg. No complications today.     Fluid Removed Total:  2400 ml             Net: 2000 ml     Interventions: VS monitoring q 15 min and as needed. Cril line monitoring.    Plan: per Renal Team

## 2023-01-06 NOTE — PLAN OF CARE
Problem: Plan of Care - These are the overarching goals to be used throughout the patient stay.    Goal: Plan of Care Review  Description: The Plan of Care Review/Shift note should be completed every shift.  The Outcome Evaluation is a brief statement about your assessment that the patient is improving, declining, or no change.  This information will be displayed automatically on your shift note.  Outcome: Progressing  Goal: Optimal Comfort and Wellbeing  Outcome: Progressing  Intervention: Provide Person-Centered Care  Recent Flowsheet Documentation  Taken 1/6/2023 0100 by Jesse Sheriff Jr, RN  Trust Relationship/Rapport: care explained     Problem: Pain Acute  Goal: Optimal Pain Control and Function  Outcome: Progressing  Intervention: Prevent or Manage Pain  Recent Flowsheet Documentation  Taken 1/6/2023 0100 by Jesse Sheriff Jr, RN  Medication Review/Management: medications reviewed     Problem: Malnutrition  Goal: Improved Nutritional Intake  Outcome: Progressing     Problem: Mechanical Ventilation Invasive  Goal: Effective Communication  Outcome: Progressing  Intervention: Ensure Effective Communication  Recent Flowsheet Documentation  Taken 1/6/2023 0100 by Jesse Sheriff Jr, RN  Trust Relationship/Rapport: care explained     Patient slept mostly all through the night, agitation not noted.  Patient is on trache with alternating vent and trach dome done by RT, O2 sat maintained at 96%. Suctioning done from time to time with bloody secretions noted. Patient repositioned comfortably in bed. At around 0700hrs, lab notified the writer regarding a critical lab value of HGB: 6.8. Dr. Fritz was then notified. Will follow up.

## 2023-01-06 NOTE — PROGRESS NOTES
Pulmonary Progress Note    Admit Date: 12/29/2022  CODE: Full Code    Assessment/Plan:   57yoM with liver transplant(2016), recent prolonged hospitalization 11/12-12/15 for PEA cardiac arrest, Strep/Parainfluenza pneumonia with ARDS, MODS, ?aspergillus pna s/p 1 week of voriconazole, right PTX requiring chest tube since removed.  Now HD dependent, s/p trach/PEG.  Course further c/b new left PTX requiring chest tube(since removed), b/l pleural effusions, ascites, mucus plugging, PEA arrest and seizure on 12/20, pleural fluid cxs growing candida parapsilosis & ascites cx growing lactobacillus on antimicrobials, and CSF panel positive for HHV6 ultimately decided to not treat.  Admitted to [facility] 12/29.     Discussion of pertinent problems:  Acute hypoxic/hypercapnic respiratory failure s/p trach: Progressing slowly with trach dome trials and AC at night as he is severely deconditioned.  CxR 1/4 with  no significant changes from previous, no recurrent PTX.  On minimal vent settings.      Tracheostomy in place: 8 Shiley placed 11/29.  Changed to 6 Shiley prox xlt 12/8, unclear why     Dysphagia s/p PEG tube.  BDT 12/30 with no immediate but moderate delayed aspiration 45min later.  Doing short PMV trials    Candida in left pleural fluid cultures: started on Micafungin switched to fluconazole 1/3 based on cx sensitivities.    - Cont fluconazole for ~4 week total course based on imaging     LORETTA on MWF HD: followed by Nephrology     S/p Liver transplant with post transplant cirrhosis & ascites s/p paracentesis 12/22: on Tacro and prednisone    CHRISTIAN on home BiPAP.  Managed currently with trach and vent     lactobacillus peritonitis started on Unasyn transitioned to PO Augmentin.    - Augmentin duration per ID & primary     Seizure after hypoxic event on 12/20 with subsequent seizure 12/21 during dialysis:   - Keppra and vimpat indefinitely     Multifocal acute ischemic strokes on MRI(12/20).    - Eliquis now on hold d/t acute  anemia with bloody secretions    Hx b/l pneumothoraces: right CT first placed 11/16, removed, then replaced by IR on 12/12, removed again 12/19.  Left chest tube placed 12/16, ?hemithorax, CT removed on 12/19    Hx mucus plugging of bronchi.  Bronch on 12/20 with minimal secretions.  Mucus plug suctioned 1/4 and restarted on metanebs   - hold metanebs until bloody secretions resolved     Acute on chronic Anemia: s/p 1 unit PRBCs 1/4 for Hgb 6.3.  Intermittent bloody tracheal secretions, Apixaban held 1/4, secretions more bloody over the last 24hr.  Hgb 6.8 this morning to get another unit of PRBCs.     Plan:   - Let him rest and will hold off on weans for now given ongoing blood loss anemia    - Bloody tracheal secretions: keep trach cuff inflated.  Stop metanebs.  Hold PMV trials.  To get blood transfusion again today for Hgb 6.8.  Continue to hold apixaban.  If bleeding worsens, will likely require a bronchoscopy to identify location of bleed for cauterization.  Checked on pt again and secretions less bloody than earlier today.  For now, will obtain CTA C/A/P and neck to r/o tracheo-innominate fistula and other potential source for bleed.  Discussed with Nephrology and ok to get contrast as he's anuric and likely ESRD       - Bronchial hygiene with Albuterol + mucomyst nebs QID.     - Abdomen more distended, suspect worsening ascites and may need repeat paracentesis - defer to primary.       Addendum 1425:  Discussed case with Dr. Chu who agrees with CTA scans.  If bleeding were to worsen over the weekend, patient should be sent out to higher level of care for bronch.      Henry Fenton, SHAWN  Pulmonary Medicine  [facility]  Pager [phone]    Subjective/Interim Events:   - his breathing is ok but tells me it feels a little harder to breath today.  Currently on full vent during dialysis run.  FiO2 21% with SpO2 100%.    - he feels more puffy and bloated today, on dialysis run       Tracheal  secretions:  Overnight - x2, sm, thick, bloody   Yesterday - x6, mod/lg, thick, white/bloody    Cough strength: moderate    Current phase of ventilator weaning pathway:  Phase 2    Ventilator weaning results   12/29: PS 12/5 for 9hr  12/30-1/1: TM 3hr x2, otherwise PS 8/5 day, AC night   1/2: TM/cuff up for 13.5hr.  AC night   1/3: 35L/35%TM for 13hr  1/4: 35L/35%TM for 11hr  1/5: 1/5: 30L/24%TM for 14.5hr    Clinical status discussed today with respiratory therapist, patient, hospitalist, Nephrology, Dr. Schroeder, brother    Medications:       dextrose       - MEDICATION INSTRUCTIONS -         sodium chloride 0.9%  300 mL Hemodialysis Machine During Dialysis/CRRT (from stock)     acetaminophen  975 mg Per Feeding Tube Q8H BIJU    Or     acetaminophen  650 mg Rectal Q8H BIJU     acetylcysteine  2 mL Nebulization 4x daily     albuterol  2.5 mg Nebulization 4x daily     amoxicillin-clavulanate  500 mg Per Feeding Tube QPM     amoxicillin-clavulanate  500 mg Per Feeding Tube Once per day on Mon Wed Fri     [Held by provider] apixaban ANTICOAGULANT  5 mg Oral or Feeding Tube BID     B and C vitamin Complex with folic acid  5 mL Per Feeding Tube Daily     carboxymethylcellulose PF  1 drop Both Eyes TID     chlorhexidine  15 mL Mouth/Throat BID     epoetin mirta-epbx  10,000 Units Subcutaneous Once per day on Mon Wed Fri     fluconazole  200 mg Per Feeding Tube At Bedtime     folic acid  1 mg Oral or Feeding Tube Daily     heparin Lock (1000 units/mL High concentration)  1,900 Units Intracatheter During Dialysis/CRRT (from stock)     heparin Lock (1000 units/mL High concentration)  1,900 Units Intracatheter During Dialysis/CRRT (from stock)     insulin aspart  1-6 Units Subcutaneous Daily     insulin glargine  20 Units Subcutaneous Daily     lacosamide  100 mg Oral or Feeding Tube BID     lactulose  20 g Oral or Feeding Tube BID     levETIRAcetam  1,000 mg Oral or Feeding Tube Q24H     Melatonin  6 mg Oral or NG Tube At Bedtime      midodrine  10 mg Oral During Dialysis/CRRT (from stock)     midodrine  5 mg Oral or Feeding Tube TID w/meals     mineral oil-hydrophilic petrolatum   Topical Daily     nystatin  500,000 Units Swish & Swallow 4x Daily     pantoprazole  40 mg Per Feeding Tube BID     QUEtiapine  75 mg Oral or Feeding Tube At Bedtime     rifaximin  550 mg Per Feeding Tube BID     sodium chloride (PF)  10-30 mL Intracatheter Q8H     tacrolimus  1 mg Oral or Feeding Tube BID         Exam/Data:   Vitals  /76   Pulse 104   Temp 98.5  F (36.9  C) (Oral)   Resp 24   Wt 94.3 kg (208 lb)   SpO2 99%   BMI 28.21 kg/m    BP - Mean:  [58-98] 91  I/O last 3 completed shifts:  In: 1862 [NG/GT:1862]  Out: -   Weight change: -0.363 kg (-12.8 oz)    Vent Mode: CMV/AC  (Continuous Mandatory Ventilation/ Assist Control)  FiO2 (%): 24 %  Resp Rate (Set): 12 breaths/min  Tidal Volume (Set, mL): 450 mL  PEEP (cm H2O): 5 cmH2O  Resp: 24      EXAM:  Gen: no distress lying in bed on dialysis run on vent   HEENT: NT, trach midline/intact, significant peristomal ecchymosis, cuff up, no oozing from trach stoma   CV: RRR  Resp: CTAB; non-labored   Abd: large, distended, soft, mildly tender, BS hypoactive  Skin: no visible rashes or lesions, scattered ecchymosis, fragile   Ext: mild pedal edema, weak  Neuro: alert, follows commands, mouths words     ROS:  A 10-system review was obtained and is negative with the exception of the symptoms noted above.    Labs:  Basic Metabolic Panel  Recent Labs   Lab 01/06/23  0631 01/05/23  2352 01/05/23  0802 01/04/23  0733 01/02/23  0813 01/02/23  0527 01/01/23  0759 01/01/23  0626 12/31/22  0826 12/31/22  0610   NA  --  136  --   --   --  134*  --  142  --  137   POTASSIUM  --  3.5  --   --   --  3.7  --  3.7  --  3.7   CHLORIDE  --   --   --   --   --  95*  --  101  --  99   CO2  --   --   --   --   --  29  --  30*  --  29   BUN  --   --   --   --   --  74.2*  --  61.1*  --  44.1*   CR  --   --   --   --   --   3.06*  --  2.72*  --  2.23*   * 121 138* 166*   < > 154*   < > 162*   < > 150*    < > = values in this interval not displayed.     Liver Function Tests  Recent Labs   Lab 01/02/23  0527 01/01/23  0626 12/31/22  0610   ALBUMIN 2.2* 2.4* 2.2*     CBC RESULTS: Recent Labs   Lab Test 01/06/23  0543 01/05/23  1442 01/04/23  0623 12/27/22  0420   WBC  --   --   --  7.5   RBC  --   --   --  2.76*   HGB 6.8*   < > 6.3* 8.8*   HCT  --   --   --  29.4*   MCV  --   --   --  107*   MCH  --   --   --  31.9   MCHC  --   --   --  29.9*   RDW  --   --   --  18.7*   PLT  --   --  58* 63*    < > = values in this interval not displayed.     Venous Blood Gas  Recent Labs   Lab 01/05/23  2352   PHV 7.43   PCO2V 49   PO2V 39   HCO3V 31*   MITA 8.0*        RADIOLOGY: Personally reviewed; radiology read below   XR CHEST PORT 1 VIEW  LOCATION: [facility]  DATE/TIME: 1/4/2023 9:21 AM     INDICATION: hypoxic resp failure; intermittent bloody trach secretions; orthopena; hx b l pleural effusions and b l PTX  COMPARISON: Portable AP view the chest 12/27/2022; CT pulmonary angiogram 12/20/2022                                                                      IMPRESSION:      Right PICC terminates in the middle third of the SVC. Tunneled right jugular approach dialysis catheter terminates in the right atrium. Tracheostomy resides within the tracheal air column.     Persistent shallow lung inflation. Territories of bibasilar atelectasis are similar. Small right pleural effusion visible in the right lateral sulcus and layering into the interlobar fissures is similar. No enlarging pleural effusion. No pneumothorax.     Unchanged heart and mediastinal contours. Unchanged oblique interface just lateral to the dialysis catheter/SVC and corresponds to rounded atelectasis on prior CT.  --------------  XR CHEST 12/27/2022     HISTORY: Shortness of breath, mucous plug?     COMPARISON: Chest radiograph 12/24/2022                                                                       IMPRESSION: Stable cardiomediastinal silhouette with unchanged  position of right upper extremity PICC and dialysis catheter.  Tracheostomy tube tip overlies the midthoracic trachea. Persistent  bilateral pleural effusions with associated bibasilar atelectasis. No  new airspace consolidation or lobar atelectasis. No pneumothorax.  Bones are unchanged. Percutaneous gastrostomy tube partially  visualized.

## 2023-01-07 ENCOUNTER — APPOINTMENT (OUTPATIENT)
Dept: OCCUPATIONAL THERAPY | Facility: CLINIC | Age: 58
DRG: 207 | End: 2023-01-07
Attending: HOSPITALIST
Payer: COMMERCIAL

## 2023-01-07 PROBLEM — B10.81 HUMAN HERPESVIRUS 6 VIREMIA: Status: RESOLVED | Noted: 2022-12-23 | Resolved: 2023-01-07

## 2023-01-07 PROBLEM — D64.9 ANEMIA: Status: ACTIVE | Noted: 2023-01-07

## 2023-01-07 PROBLEM — B34.8 PARAINFLUENZA TYPE 1 INFECTION: Status: RESOLVED | Noted: 2022-11-19 | Resolved: 2023-01-07

## 2023-01-07 LAB
GLUCOSE BLDC GLUCOMTR-MCNC: 135 MG/DL (ref 70–99)
GLUCOSE BLDC GLUCOMTR-MCNC: 137 MG/DL (ref 70–99)
PHOSPHATE SERPL-MCNC: 2.5 MG/DL (ref 2.5–4.5)

## 2023-01-07 PROCEDURE — 250N000009 HC RX 250: Performed by: HOSPITALIST

## 2023-01-07 PROCEDURE — 94640 AIRWAY INHALATION TREATMENT: CPT

## 2023-01-07 PROCEDURE — 97110 THERAPEUTIC EXERCISES: CPT | Mod: GO | Performed by: OCCUPATIONAL THERAPIST

## 2023-01-07 PROCEDURE — 250N000013 HC RX MED GY IP 250 OP 250 PS 637: Performed by: NURSE PRACTITIONER

## 2023-01-07 PROCEDURE — 999N000157 HC STATISTIC RCP TIME EA 10 MIN

## 2023-01-07 PROCEDURE — 250N000013 HC RX MED GY IP 250 OP 250 PS 637: Performed by: HOSPITALIST

## 2023-01-07 PROCEDURE — 250N000009 HC RX 250: Performed by: NURSE PRACTITIONER

## 2023-01-07 PROCEDURE — 120N000017 HC R&B RESPIRATORY CARE

## 2023-01-07 PROCEDURE — 94640 AIRWAY INHALATION TREATMENT: CPT | Mod: 76

## 2023-01-07 PROCEDURE — 99233 SBSQ HOSP IP/OBS HIGH 50: CPT | Performed by: HOSPITALIST

## 2023-01-07 PROCEDURE — 94003 VENT MGMT INPAT SUBQ DAY: CPT

## 2023-01-07 PROCEDURE — 97535 SELF CARE MNGMENT TRAINING: CPT | Mod: GO | Performed by: OCCUPATIONAL THERAPIST

## 2023-01-07 PROCEDURE — 84100 ASSAY OF PHOSPHORUS: CPT | Performed by: HOSPITALIST

## 2023-01-07 PROCEDURE — 250N000012 HC RX MED GY IP 250 OP 636 PS 637: Performed by: HOSPITALIST

## 2023-01-07 PROCEDURE — 999N000009 HC STATISTIC AIRWAY CARE

## 2023-01-07 RX ADMIN — NYSTATIN 500000 UNITS: 100000 SUSPENSION ORAL at 17:07

## 2023-01-07 RX ADMIN — ALBUTEROL SULFATE 2.5 MG: 2.5 SOLUTION RESPIRATORY (INHALATION) at 11:37

## 2023-01-07 RX ADMIN — Medication 6 MG: at 22:07

## 2023-01-07 RX ADMIN — MIDODRINE HYDROCHLORIDE 5 MG: 5 TABLET ORAL at 08:53

## 2023-01-07 RX ADMIN — RIFAXIMIN 550 MG: 550 TABLET ORAL at 08:53

## 2023-01-07 RX ADMIN — ACETAMINOPHEN 975 MG: 325 TABLET, FILM COATED ORAL at 06:06

## 2023-01-07 RX ADMIN — MIDODRINE HYDROCHLORIDE 5 MG: 5 TABLET ORAL at 12:58

## 2023-01-07 RX ADMIN — ACETAMINOPHEN 975 MG: 325 TABLET, FILM COATED ORAL at 23:48

## 2023-01-07 RX ADMIN — AMOXICILLIN AND CLAVULANATE POTASSIUM 500 MG: 400; 57 POWDER, FOR SUSPENSION ORAL at 22:07

## 2023-01-07 RX ADMIN — ACETAMINOPHEN 650 MG: 325 TABLET, FILM COATED ORAL at 17:10

## 2023-01-07 RX ADMIN — FOLIC ACID 1 MG: 1 TABLET ORAL at 08:53

## 2023-01-07 RX ADMIN — TACROLIMUS 1 MG: 5 CAPSULE ORAL at 17:10

## 2023-01-07 RX ADMIN — Medication 40 MG: at 22:09

## 2023-01-07 RX ADMIN — RIFAXIMIN 550 MG: 550 TABLET ORAL at 22:15

## 2023-01-07 RX ADMIN — ACETYLCYSTEINE 2 ML: 200 SOLUTION ORAL; RESPIRATORY (INHALATION) at 16:31

## 2023-01-07 RX ADMIN — Medication 40 MG: at 08:53

## 2023-01-07 RX ADMIN — WHITE PETROLATUM: 1.75 OINTMENT TOPICAL at 08:54

## 2023-01-07 RX ADMIN — TACROLIMUS 1 MG: 5 CAPSULE ORAL at 08:53

## 2023-01-07 RX ADMIN — ALBUTEROL SULFATE 2.5 MG: 2.5 SOLUTION RESPIRATORY (INHALATION) at 07:38

## 2023-01-07 RX ADMIN — LACTULOSE 20 G: 20 SOLUTION ORAL at 08:54

## 2023-01-07 RX ADMIN — Medication 1 DROP: at 08:53

## 2023-01-07 RX ADMIN — ALBUTEROL SULFATE 2.5 MG: 2.5 SOLUTION RESPIRATORY (INHALATION) at 16:31

## 2023-01-07 RX ADMIN — ACETYLCYSTEINE 2 ML: 200 SOLUTION ORAL; RESPIRATORY (INHALATION) at 07:39

## 2023-01-07 RX ADMIN — Medication 5 ML: at 08:53

## 2023-01-07 RX ADMIN — CHLORHEXIDINE GLUCONATE 0.12% ORAL RINSE 15 ML: 1.2 LIQUID ORAL at 08:53

## 2023-01-07 RX ADMIN — ALBUTEROL SULFATE 2.5 MG: 2.5 SOLUTION RESPIRATORY (INHALATION) at 19:29

## 2023-01-07 RX ADMIN — NYSTATIN 500000 UNITS: 100000 SUSPENSION ORAL at 08:53

## 2023-01-07 RX ADMIN — ACETYLCYSTEINE 2 ML: 200 SOLUTION ORAL; RESPIRATORY (INHALATION) at 19:29

## 2023-01-07 RX ADMIN — LACOSAMIDE 100 MG: 10 SOLUTION ORAL at 22:07

## 2023-01-07 RX ADMIN — NYSTATIN 500000 UNITS: 100000 SUSPENSION ORAL at 12:58

## 2023-01-07 RX ADMIN — NYSTATIN 500000 UNITS: 100000 SUSPENSION ORAL at 22:11

## 2023-01-07 RX ADMIN — LEVETIRACETAM 1000 MG: 100 SOLUTION ORAL at 22:10

## 2023-01-07 RX ADMIN — ACETYLCYSTEINE 2 ML: 200 SOLUTION ORAL; RESPIRATORY (INHALATION) at 11:38

## 2023-01-07 RX ADMIN — INSULIN GLARGINE 20 UNITS: 100 INJECTION, SOLUTION SUBCUTANEOUS at 08:53

## 2023-01-07 RX ADMIN — LACOSAMIDE 100 MG: 10 SOLUTION ORAL at 08:53

## 2023-01-07 RX ADMIN — Medication 1 DROP: at 22:08

## 2023-01-07 RX ADMIN — FLUCONAZOLE 200 MG: 200 TABLET ORAL at 22:09

## 2023-01-07 RX ADMIN — Medication 1 DROP: at 13:03

## 2023-01-07 RX ADMIN — CHLORHEXIDINE GLUCONATE 0.12% ORAL RINSE 15 ML: 1.2 LIQUID ORAL at 22:10

## 2023-01-07 RX ADMIN — QUETIAPINE 75 MG: 25 TABLET ORAL at 22:10

## 2023-01-07 RX ADMIN — ACETAMINOPHEN 975 MG: 325 TABLET, FILM COATED ORAL at 13:03

## 2023-01-07 RX ADMIN — LACTULOSE 20 G: 20 SOLUTION ORAL at 22:10

## 2023-01-07 ASSESSMENT — ACTIVITIES OF DAILY LIVING (ADL)
ADLS_ACUITY_SCORE: 62
ADLS_ACUITY_SCORE: 62
ADLS_ACUITY_SCORE: 58
ADLS_ACUITY_SCORE: 62
ADLS_ACUITY_SCORE: 62
ADLS_ACUITY_SCORE: 56
ADLS_ACUITY_SCORE: 62
ADLS_ACUITY_SCORE: 56
ADLS_ACUITY_SCORE: 62
ADLS_ACUITY_SCORE: 62

## 2023-01-07 NOTE — PROGRESS NOTES
Pt came back at 0015 from [facility]  via stretcher accompanied by EMS transport. Pt  was transferred to bed  With a ibeth, hooked  to tele monitor. Vitals taken and WNL. House Officer informed of the CT results via text page. No new orders noted.

## 2023-01-07 NOTE — PLAN OF CARE
Problem: Mechanical Ventilation Invasive  Goal: Optimal Device Function  Outcome: Progressing  Intervention: Optimize Device Care and Function  Recent Flowsheet Documentation  Taken 1/7/2023 1631 by Wilner Beyer, RT  Airway Safety Measures: all equipment/monitors on and audible  Taken 1/7/2023 1500 by Wilner Beyer, RT  Airway Safety Measures: all equipment/monitors on and audible  Taken 1/7/2023 1344 by Wilner Beyer, RT  Airway Safety Measures: all equipment/monitors on and audible  Taken 1/7/2023 1138 by Wilner Beyer, RT  Airway Safety Measures: all equipment/monitors on and audible  Taken 1/7/2023 0805 by Wilner Beyer, RT  Airway Safety Measures: all equipment/monitors on and audible  Taken 1/7/2023 0738 by Wilner Beyer, RT  Airway Safety Measures: all equipment/monitors on and audible  Goal: Mechanical Ventilation Liberation  Outcome: Progressing   RT PROGRESS NOTE     DATA:     CURRENT SETTINGS:ac/12/450/+5, 25%             TRACH TYPE / SIZE:  # 6 shiley xlt prox, 11/29             MODE:   AC MODE             FIO2:   25%     ACTION:             THERAPIES:   ALB/MUCCO QID             SUCTION:                           FREQUENCY:   X4                         AMOUNT:   small/mod                        CONSISTENCY:   thick                        COLOR:   pale/yellow             SPONTANEOUS COUGH EFFORT/STRENGTH OF EFFORT (not elicited by suctioning):                               WEANING PHASE:   2                        WEAN MODE:    35%tm +30L                        WEAN TIME:  since 0805                        END WEAN REASON:   cont      RESPONSE:             BS:   crs             VITAL SIGNS:   %, RR 20-22             EMOTIONAL NEEDS / CONCERNS:               RISK FOR SELF DECANNULATION:                         RISK DUE TO:                         INTERVENTION/S IN PLACE IS/ARE:       NOTE / PLAN:   Goal Outcome Evaluation:         Placed pt on 35%tm with 30L ,  tolerating well, pmv trail done for 1 hour while pt up in chair, pt does not have bloody sxn anymore ,  Plan- consider downsizing the trach

## 2023-01-07 NOTE — PLAN OF CARE
Problem: Mechanical Ventilation Invasive  Goal: Optimal Device Function  Outcome: Progressing  Goal: Mechanical Ventilation Liberation  Outcome: Progressing  Goal: Absence of Device-Related Skin and Tissue Injury  Outcome: Progressing  Goal: Absence of Ventilator-Induced Lung Injury  Outcome: Progressing   RT PROGRESS NOTE     DATA:     CURRENT SETTINGS: AC 12, 450, +5, 25%             TRACH TYPE / SIZE: # 6 SHILEY  XLT  PROXIMAL Placed on 11/29/2022             MODE: AC             FIO2:  25%     ACTION:             THERAPIES: Albuterol /Mucomyst neb QID              SUCTION:                           FREQUENCY: X5                        AMOUNT: Moderate to copious                         CONSISTENCY:thick                        COLOR: Pale/bloody/ light blood              SPONTANEOUS COUGH EFFORT/STRENGTH OF EFFORT (not elicited by suctioning):Strong                              WEANING PHASE:on hold                        WEAN MODE:                        WEAN TIME:                         END WEAN REASON     RESPONSE:             BS: Clear Decreased              VITAL SIGNS:Sat  %,HR  ,RR 20-             EMOTIONAL NEEDS / CONCERNS:  no              RISK FOR SELF DECANNULATION:                          RISK DUE TO:                          INTERVENTION/S IN PLACE IS/ARE:     NOTE / PLAN: Cont . Current plan of care/ Patient currently on Full Support , Patient weaned on 30%@30L/MIN TM/cuff up for 1 HR 35 MIN  before weaning put on hold , Patient was having bloody secretions during morning time ,which improved to light blood , Patient is going to [facility] for CT  Scan-P/U  Time= 7PM

## 2023-01-07 NOTE — PROGRESS NOTES
[facility]    Medicine Progress Note - Hospitalist Service    Date of Admission:  12/29/2022    History of Present Illness  Blanco Osborne is an 57 year old male who is transferred from [facility] for rehab and continuation of IV antibiotic treatment.  Patient has multiple medical problems including history of liver transplant 2016, PAF on chronic anticoagulation, history of DM2, CVA, HTN, CHRISTIAN on BiPAP, and history of alcohol abuse in the past causing liver damage ending with liver transplant.  About 6 weeks ago he had respiratory arrest and was diagnosed with infection with parainfluenza and strep pneumoniae and acute on chronic renal insufficiency ending with CRF needing 3/week hemodialysis.  During this period he was diagnosed with cirrhosis of transplanted liver and hyperammonia.  Currently he is on Lactulose and Rifaximin.        On 12/14/22, due to CIP, he was transferred to [facility] for the first time with Trach, PEG and Rt chest tube.    On 12/16/2022 patient was transferred back to [facility] due to respiratory insufficiency secondary to hydropneumothorax and drainage of 900 cc bloody pleural effusion, bloody stool and transfusion of 2 units PRBC.      On 12/20/2022 he had another episode of PEA arrest followed by CPR x2 minutes and possibly had seizure activity in [facility]. 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  He has been on ventilator and has improved to trach dome tolerating up to 6 hours so far. He had chest tube which was removed.    He had SBP with growth of lactobacillus and is currently on Unasyn which will be switched to Augmentin orally through 1/12/2023.    Pleural effusion grew Candida and is on micafungin.  He is also on  due to cirrhosis and hyperammonia.  He is anemic and is on Retacrit.   He is on lacosamide and Keppra due to history of seizure.  There is  question of him drinking again causing him cirrhosis of the transplanted liver.    Assessment & Plan   Principal Problem:    Acute on chronic respiratory failure with hypoxia (H)  Active Problems:    Acquired immunocompromised state (H)    Cirrhosis of liver (H)    NAFLD (nonalcoholic fatty liver disease)    Liver transplanted (H)    Immunosuppression (H)    Acute kidney injury (H)    Spontaneous bacterial peritonitis (H)    Critical illness myopathy    History of sudden cardiac arrest, PEA    Dependent on hemodialysis (H)    Pulmonary:  Acute now chronic respiratory failure requiring tracheostomy..  Initial event was parainfluenza and strep pneumo pneumonia.  Had failed extubation x2 and tracheostomy performed last hospitalization.  Had additional complications of bilateral pneumothoraces.  (Most recently was a hydropneumothorax where fluid grew Candida..  Chest tube was placed and removed during this hospitalization.  Had decent tolerance for pressure support and trach dome but after few hours and trach dome 1227 had a pretty significant episode of desaturation.   Plan:  1.  We will alternate trach dome and pressure support and try to wean.  Pulmonary following.   2.  At least 4 weeks of antimicrobial for Candida detailed in ID section    Cardiovascular system:  PEA arrest prior to his previous admission and 1 episode of PEA associated with desaturation on 1220.  No structural cardiac disease but does have A. Fib which neurologist thought might have been contributory to his embolic strokes.  Anticoagulation been restarted with stable labs although high risk for bleeding seconday to thrombocytopenia.  Also has developed baseline hypotension and is on midodrine 10 mg 3 times daily.  Plan:  Continue Eliquis.  Continue current midodrine dose     CNS:  1. Seizure after hypoxic event.  This is in the context of baseline multifactorial encephalopathy secondary to his ongoing critical illness and prior cardiac arrests and  prior strokes and cirrhosis with hepatic encephalopathy. MRI of head of 12/20/22 reveals no PRES or leptomeningeal enhancement but scattered.  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   Plan:  1.  Keppra and vimpat indefinitely.  Neurology follow-up.  2.  Anticoagulation indefinitely for secondary stroke prevention.  On hold since 1/6/2023 due to tracheal bleeding during suction.  Patient is currently in sinus rhythm.  And telemetry and monitored.  3. Tylenol TID for headache.        Renal/Electrolytes:  1.  ESRD: Now on iHD.  No return of renal function.  - MWF schedule      ID:  1. 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 4.  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  Have had some conversations within our group and with transplant ID and general infectious disease.  General consensus is that ganciclovir probably could be discontinued  2. H/o Strep/Parainfluenza infection last hospitalization. Completed treatment course  3. 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  4.  Lactobacillus growing in the broth 4 days after paracentesis.  Cell count was very low.  5.  Candida in left pleural fluid cultures.  Sensitivities not resulted.  6.  Immunosuppressed on tacrolimus.  Discussed with transplant service at the [facility] who felt that given the absence of rejection on his most recent biopsy and ongoing issues with infection continuing with 1 twice daily tacrolimus is the best current option.  They have seen his subtherapeutic trough levels.  Plan:  1.  Confirm with transplant ID whether or not we can stop ganciclovir.  2.  Unasyn for 2 weeks total.  Change to Augmentin through 1/12/2023.  3.  At least 4 weeks of antimicrobial for the Candida.  If sensitive to fluconazole could de-escalate from micafungin.  4.  Continue tacrolimus as above and we are tapering steroids     GI/:  Post transplant cirrhosis.  Main manifestations of this are hepatic encephalopathy and ascites.   Hepatologist at [place] felt that most likely reason for the cirrhosis was metabolic syndrome or alcohol intake.  There was no evidence of rejection on biopsy and there was some evidence of regeneration. Paracentesis done on 12/22/2022 with lack of bacillus in the broth indicating SBP low cell count was very low  Plan:  1.  Intermittent paracentesis as needed.  2.  Getting 1 mg  tacrolimus BID and tapering steroids   3. Lactulose and Rifaximin as above  4.  Treat SBP for 2 weeks.  5.  He has an appointment in April with Dr. Simms if he is out of the hospital.     Endocrine:  1. DM.  Titrating insulin. He needs supplemental coverage.  2. He is on steroids which we are weaning  Plan:  Continue with current regimen.      Nutrition:  At goal tube feeds via PEG tube     Heme/Onc:  1. Pancytopenia; possibly due cirrhosis. WBC count has improved . Chronic splenomegaly was present prior to liver transplant but has not regressed.   2. Needs anticoagulation for AFib.   Plan:  1.  Continue Eliquis      ICU prophylaxis:      DVT PPX: Eliquis      Restraints needed: No     Stress ulcer: IV PPI     Central line: Needed for IV access/Meds.     Code Status: Full     Diet: Adult Formula Drip Feeding: Continuous Novasource Renal; Gastrostomy; Goal Rate: 60; mL/hr    DVT Prophylaxis: DOAC  Nixon Catheter: Not present  Central Lines: PRESENT      Cardiac Monitoring: ACTIVE order. Indication: Tachyarrhythmias, acute (48 hours)  Code Status: Full Code      Disposition Plan     Expected Discharge Date: 01/12/2023    Discharge Delays: Complex Discharge    Discharge Comments: Vent. Trach. Phase 1. PEG.  Rectal Tube.      The patient's care was discussed with the Bedside Nurse, Patient and Patient's Family.    Edgar Fritz MD  Hospitalist Service  [facility]  Securely message with the Vocera Web Console (learn more here)  Text page via [employer] Paging/Directory         Clinically Significant Risk Factors          # Hypocalcemia: Lowest iCa =  1.19 mg/dL in last 2 days, will monitor and replace as appropriate     # Hypoalbuminemia: Lowest albumin = 1.8 g/dL at 12/29/2022  4:40 AM, will monitor as appropriate             # Severe Malnutrition: based on nutrition assessment      _____________________________________________________________________    Interval History   Patient is sitting in bed and is in no distress. He has tracheostomy and is on vent.  He is comfortable.  He did not verbalize any concerns or discomfort.  He has unchanged degree of confusion and disorientation as usual. Patient denies any chest pain or shortness of breath or abdominal pain or nausea or vomiting or current headache. He is on hemodialysis 3 days a week.  Vital signs are blood glucose are stable.  He is on phase 2 of vent weaning.  His latest platelet count is down to 58K.  He had 1 unit of PRBC transfusion 1/4/23.  Today's hemoglobin is 6.8 g/dL.  He was transfused 1 units of PRBC today 1/6/2023.  Due to history of embolic CVA and PAF he is on Eliquis. He is in sinus rhythm.  I placed him on telemetry and watch his rhythm.  I hold his Eliquis for now as long as he is in sinus rhythm.  He has some bleeding with bronchial suction which hopefully resolves with holding Eliquis.   We obtained CTA of neck and CT of chest abdomen and pelvis with contrast due to possibility of internal bleeding which was negative.  He has large ascites but denies major discomfort  He has some behavioral issues especially at night as per nursing staff.  I have increased dosage of Seroquel to 75 mg.  Currently is a stable.  Pulmonology following.    Data reviewed today: I reviewed all medications, new labs and imaging results over the last 24 hours. I personally reviewed no images or EKG's today.    Physical Exam   Vital Signs: Temp: 98.4  F (36.9  C) Temp src: Oral BP: 114/73 Pulse: 99   Resp: 29 SpO2: 100 % O2 Device: Mechanical Ventilator Oxygen Delivery: 30 LPM  Weight: 208 lbs 0 oz  Patient is  sitting in bed.  Is in no acute distress.  Looks pale and chronically ill.  He communicates and is pleasant and cooperative.  He is on ventilator through tracheostomy  HEENT: Nl oral mucosal moisture.  Pale mucosa, no icterus.  Neck: Tracheostomy in place.  Right external jugular CVC.  Heart: Regular rate and rhythm, no murmur  Abdomen: Large abdomen due to large ascites.  Nontender.  No mass or organomegaly noted.  Patient has rectal tube.  PEG tube in place.  : Deferred  Extremities: PICC line in right brachial artery.  No cyanosis.  Neurological exam: Patient is alert and oriented x2.  Has normal behavior.  Gross cranial nerve neurological exam did not show any major abnormality.  Left  is weaker than the right otherwise no focal neurological deficit noted in limited neurological exam. Gait and station were not examined.    Data   Recent Labs   Lab 01/06/23  0631 01/06/23  0543 01/05/23  2352 01/05/23  1442 01/05/23  0802 01/04/23  0629 01/04/23  0623 01/02/23  0813 01/02/23  0527 01/01/23  0759 01/01/23  0626 12/31/22  0826 12/31/22  0610   HGB  --  6.8* 7.3* 7.6*  --   --  6.3*   < >  --   --   --   --   --    PLT  --   --   --   --   --   --  58*  --   --   --   --   --   --    NA  --   --  136  --   --   --   --   --  134*  --  142  --  137   POTASSIUM  --   --  3.5  --   --   --   --   --  3.7  --  3.7  --  3.7   CHLORIDE  --   --   --   --   --   --   --   --  95*  --  101  --  99   CO2  --   --   --   --   --   --   --   --  29  --  30*  --  29   BUN  --   --   --   --   --   --   --   --  74.2*  --  61.1*  --  44.1*   CR  --   --   --   --   --   --   --   --  3.06*  --  2.72*  --  2.23*   ANIONGAP  --   --   --   --   --   --   --   --  10  --  11  --  9   KELLY  --   --   --   --   --   --   --   --  8.5*  --  8.6  --  8.0*   *  --  121  --  138*   < >  --    < > 154*   < > 162*   < > 150*   ALBUMIN  --   --   --   --   --   --   --   --  2.2*  --  2.4*  --  2.2*    < > = values in this  interval not displayed.     EXAM: CTA CHEST ABDOMEN PELVIS W CONTRAST, 1/6/2023  IMPRESSION:  1. No obvious active bleeding identified.  2. Large amount of abdominal/pelvic ascites, splenomegaly, and scattered varicosities. The above findings would be most typical for sequelae of portal venous hypertension.  3. Significant stranding of the omental fat and intraperitoneal fat, this is nonspecific, but may be reactive or neoplastic in nature.  4. Percutaneous gastrostomy tube, tracheostomy, and the rectal tube appear to be in good position.  5. Central catheter with tip in the inferior right atrium.  6. Mild to moderate right pleural effusion and mild left pleural effusion, both of these appear to be loculated given the peripheral enhancement. Presumed sequelae of prior hematoma seen in the right lung.  7. Minute amount of gas seen associated with the left pleural effusion and this is presumed to be iatrogenic in nature.  8. Slight filling defect within the lower trachea most typical for slight mucous.  9. Enlargement of the main pulmonary artery presumed to represent sequelae of pulmonary arterial hypertension.     EXAM: CTA NECK WITH CONTRAST, 1/6/2023  IMPRESSION:   1.  No significant stenosis or dissection.  2.  No definite findings to suggest a tracheoinnominate artery fistula.    Medications     dextrose       - MEDICATION INSTRUCTIONS -         sodium chloride 0.9%  300 mL Hemodialysis Machine During Dialysis/CRRT (from stock)     acetaminophen  975 mg Per Feeding Tube Q8H BIJU    Or     acetaminophen  650 mg Rectal Q8H BIJU     acetylcysteine  2 mL Nebulization 4x daily     albuterol  2.5 mg Nebulization 4x daily     amoxicillin-clavulanate  500 mg Per Feeding Tube QPM     amoxicillin-clavulanate  500 mg Per Feeding Tube Once per day on Mon Wed Fri     [Held by provider] apixaban ANTICOAGULANT  5 mg Oral or Feeding Tube BID     B and C vitamin Complex with folic acid  5 mL Per Feeding Tube Daily      carboxymethylcellulose PF  1 drop Both Eyes TID     chlorhexidine  15 mL Mouth/Throat BID     epoetin mirta-epbx  10,000 Units Subcutaneous Once per day on Mon Wed Fri     fluconazole  200 mg Per Feeding Tube At Bedtime     folic acid  1 mg Oral or Feeding Tube Daily     heparin Lock (1000 units/mL High concentration)  1,900 Units Intracatheter During Dialysis/CRRT (from stock)     heparin Lock (1000 units/mL High concentration)  1,900 Units Intracatheter During Dialysis/CRRT (from stock)     insulin aspart  1-6 Units Subcutaneous Daily     insulin glargine  20 Units Subcutaneous Daily     lacosamide  100 mg Oral or Feeding Tube BID     lactulose  20 g Oral or Feeding Tube BID     levETIRAcetam  1,000 mg Oral or Feeding Tube Q24H     Melatonin  6 mg Oral or NG Tube At Bedtime     midodrine  10 mg Oral During Dialysis/CRRT (from stock)     midodrine  5 mg Oral or Feeding Tube TID w/meals     mineral oil-hydrophilic petrolatum   Topical Daily     nystatin  500,000 Units Swish & Swallow 4x Daily     pantoprazole  40 mg Per Feeding Tube BID     QUEtiapine  75 mg Oral or Feeding Tube At Bedtime     rifaximin  550 mg Per Feeding Tube BID     sodium chloride (PF)  10-30 mL Intracatheter Q8H     tacrolimus  1 mg Oral or Feeding Tube BID

## 2023-01-07 NOTE — PROGRESS NOTES
[facility]    Medicine Progress Note - Hospitalist Service    Date of Admission:  12/29/2022    History of Present Illness  Blanco Osborne is an 57 year old male who is transferred from [facility] for rehab and continuation of IV antibiotic treatment.  Patient has multiple medical problems including history of liver transplant 2016, PAF on chronic anticoagulation, history of DM2, CVA, HTN, CHRISTIAN on BiPAP, and history of alcohol abuse in the past causing liver damage ending with liver transplant.  About 6 weeks ago he had respiratory arrest and was diagnosed with infection with parainfluenza and strep pneumoniae and acute on chronic renal insufficiency ending with CRF needing 3/week hemodialysis.  During this period he was diagnosed with cirrhosis of transplanted liver and hyperammonia.  Currently he is on Lactulose and Rifaximin.        On 12/14/22, due to CIP, he was transferred to [facility] for the first time with Trach, PEG and Rt chest tube.    On 12/16/2022 patient was transferred back to [facility] due to respiratory insufficiency secondary to hydropneumothorax and drainage of 900 cc bloody pleural effusion, bloody stool and transfusion of 2 units PRBC.      On 12/20/2022 he had another episode of PEA arrest followed by CPR x2 minutes and possibly had seizure activity in [facility]. 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  He has been on ventilator and has improved to trach dome tolerating up to 6 hours so far. He had chest tube which was removed.    He had SBP with growth of lactobacillus and is currently on Unasyn which will be switched to Augmentin orally through 1/12/2023.    Pleural effusion grew Candida and is on micafungin.  He is also on  due to cirrhosis and hyperammonia.  He is anemic and is on Retacrit.   He is on lacosamide and Keppra due to history of seizure.  There is  question of him drinking again causing him cirrhosis of the transplanted liver.    Assessment & Plan   Principal Problem:    Acute on chronic respiratory failure with hypoxia (H)  Active Problems:    Acquired immunocompromised state (H)    Cirrhosis of liver (H)    NAFLD (nonalcoholic fatty liver disease)    Liver transplanted (H)    Immunosuppression (H)    Acute kidney injury (H)    Spontaneous bacterial peritonitis (H)    Critical illness myopathy    History of sudden cardiac arrest, PEA    Dependent on hemodialysis (H)    Pulmonary:  Acute now chronic respiratory failure requiring tracheostomy..  Initial event was parainfluenza and strep pneumo pneumonia.  Had failed extubation x2 and tracheostomy performed last hospitalization.  Had additional complications of bilateral pneumothoraces.  (Most recently was a hydropneumothorax where fluid grew Candida..  Chest tube was placed and removed during this hospitalization.  Had decent tolerance for pressure support and trach dome but after few hours and trach dome 1227 had a pretty significant episode of desaturation.   Plan:  1.  We will alternate trach dome and pressure support and try to wean.  Pulmonary following.   2.  At least 4 weeks of antimicrobial for Candida detailed in ID section    Cardiovascular system:  PEA arrest prior to his previous admission and 1 episode of PEA associated with desaturation on 1220.  No structural cardiac disease but does have A. Fib which neurologist thought might have been contributory to his embolic strokes.  Anticoagulation been restarted with stable labs although high risk for bleeding seconday to thrombocytopenia.  Also has developed baseline hypotension and is on midodrine 10 mg 3 times daily.  Plan:  Continue Eliquis.  Continue current midodrine dose     CNS:  1. Seizure after hypoxic event.  This is in the context of baseline multifactorial encephalopathy secondary to his ongoing critical illness and prior cardiac arrests and  prior strokes and cirrhosis with hepatic encephalopathy. MRI of head of 12/20/22 reveals no PRES or leptomeningeal enhancement but scattered.  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   Plan:  1.  Keppra and vimpat indefinitely.  Neurology follow-up.  2.  Anticoagulation indefinitely for secondary stroke prevention.  On hold since 1/6/2023 due to tracheal bleeding during suction.  Patient is currently in sinus rhythm.  And telemetry and monitored.  3. Tylenol TID for headache.        Renal/Electrolytes:  1.  ESRD: Now on iHD.  No return of renal function.  - MWF schedule      ID:  1. 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 4.  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  Have had some conversations within our group and with transplant ID and general infectious disease.  General consensus is that ganciclovir probably could be discontinued  2. H/o Strep/Parainfluenza infection last hospitalization. Completed treatment course  3. 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  4.  Lactobacillus growing in the broth 4 days after paracentesis.  Cell count was very low.  5.  Candida in left pleural fluid cultures.  Sensitivities not resulted.  6.  Immunosuppressed on tacrolimus.  Discussed with transplant service at the [facility] who felt that given the absence of rejection on his most recent biopsy and ongoing issues with infection continuing with 1 twice daily tacrolimus is the best current option.  They have seen his subtherapeutic trough levels.  Plan:  1.  Confirm with transplant ID whether or not we can stop ganciclovir.  2.  Unasyn for 2 weeks total.  Change to Augmentin through 1/12/2023.  3.  At least 4 weeks of antimicrobial for the Candida.  If sensitive to fluconazole could de-escalate from micafungin.  4.  Continue tacrolimus as above and we are tapering steroids     GI/:  Post transplant cirrhosis.  Main manifestations of this are hepatic encephalopathy and ascites.   Hepatologist at [facility] felt that most likely reason for the cirrhosis was metabolic syndrome or alcohol intake.  There was no evidence of rejection on biopsy and there was some evidence of regeneration. Paracentesis done on 12/22/2022 with lack of bacillus in the broth indicating SBP low cell count was very low  Plan:  1.  Intermittent paracentesis as needed.  2.  Getting 1 mg  tacrolimus BID and tapering steroids   3. Lactulose and Rifaximin as above  4.  Treat SBP for 2 weeks.  5.  He has an appointment in April with Dr. Simms if he is out of the hospital.     Endocrine:  1. DM.  Titrating insulin. He needs supplemental coverage.  2. He is on steroids which we are weaning  Plan:  Continue with current regimen.      Nutrition:  At goal tube feeds via PEG tube     Heme/Onc:  1. Pancytopenia due cirrhosis, ESRD and hypersplenism. WBC count has improved.  However he needs frequent PRBC transfusion due Hb <7.0.  2. Patient is on anticoagulation for PAF.  Currently in sinus rhythm.  Is off anticoagulation due to tracheal bleeding.  Plan:  1.  Eliquis is on hold for now.     ICU prophylaxis:      DVT PPX: Eliquis on hold due to tracheal bleeding.  Patient is in sinus rhythm     Restraints needed: No     Stress ulcer: IV PPI     Central line: Needed for IV access/Meds.     Code Status: Full     Diet: Adult Formula Drip Feeding: Continuous Novasource Renal; Gastrostomy; Goal Rate: 60; mL/hr    DVT Prophylaxis: DOAC  Nixon Catheter: Not present  Central Lines: PRESENT      Cardiac Monitoring: ACTIVE order. Indication: Tachyarrhythmias, acute (48 hours)  Code Status: Full Code      Disposition Plan     Expected Discharge Date: 01/12/2023    Discharge Delays: Complex Discharge    Discharge Comments: Vent. Trach. Phase 1. PEG.  Rectal Tube.      The patient's care was discussed with the Bedside Nurse, Patient and Patient's Family.    Edgar Fritz MD  Hospitalist Service  [facility]  Securely message with the Vocera Web  Console (learn more here)  Text page via [facility] Paging/Directory     Clinically Significant Risk Factors          # Hypocalcemia: Lowest iCa = 1.19 mg/dL in last 2 days, will monitor and replace as appropriate     # Hypoalbuminemia: Lowest albumin = 1.8 g/dL at 12/29/2022  4:40 AM, will monitor as appropriate             # Severe Malnutrition: based on nutrition assessment      _____________________________________________________________________    Interval History   Patient is sitting in bed and is in no distress. He has tracheostomy and is on vent.  He is comfortable.  He did not verbalize any concerns or discomfort.  He has unchanged degree of confusion and disorientation as usual. Patient denies any chest pain or shortness of breath or abdominal pain or nausea or vomiting or current headache. He is on hemodialysis 3 days a week.  Vital signs are blood glucose are stable.  He is on phase 2 of vent weaning.  His latest platelet count is down to 58K.  He had 1 unit of PRBC transfusion 1/4/23.  Today's hemoglobin is 6.8 g/dL.  He was transfused 1 units of PRBC today 1/6/2023.  Due to history of embolic CVA and PAF he is on chronic anticoagulation with Eliquis. He is in sinus rhythm.  I placed him on telemetry and watch his rhythm.  On 1/6/2023 I held Eliquis.  He has some bleeding with bronchial suction which hopefully resolves with holding Eliquis.  Check hemoglobin tomorrow.  Currently is a stable.  Pulmonology following.    I spoke with the patient, his wife and his brother in detail and answered all their questions and concerns.  He might benefit from a paracentesis in the next couple of days.    Data reviewed today: I reviewed all medications, new labs and imaging results over the last 24 hours. I personally reviewed no images or EKG's today.    Physical Exam   Vital Signs: Temp: 98.9  F (37.2  C) Temp src: Oral BP: 114/75 Pulse: 99   Resp: 22 SpO2: 99 % O2 Device: Trach dome Oxygen Delivery: 30 LPM  Weight:  204 lbs 6.4 oz  Patient is sitting in bed.  Is in no acute distress.  Looks pale and chronically ill.  He communicates and is pleasant and cooperative.  He is on ventilator through tracheostomy  HEENT: Nl oral mucosal moisture.  Pale mucosa, no icterus.  Neck: Tracheostomy in place.  Right external jugular CVC.  Heart: Regular rate and rhythm, no murmur  Abdomen: Large abdomen due to large ascites.  Nontender.  No mass or organomegaly noted.  Patient has rectal tube.  PEG tube in place.  : Deferred  Extremities: PICC line in right brachial artery.  No cyanosis.  Neurological exam: Patient is alert and oriented x2.  Has normal behavior.  Gross cranial nerve neurological exam did not show any major abnormality.  Left  is weaker than the right otherwise no focal neurological deficit noted in limited neurological exam. Gait and station were not examined.    Data   Recent Labs   Lab 01/07/23  0551 01/06/23  0631 01/06/23  0543 01/05/23  2352 01/05/23  1442 01/04/23  0629 01/04/23  0623 01/02/23  0813 01/02/23  0527 01/01/23  0759 01/01/23  0626   HGB  --   --  6.8* 7.3* 7.6*  --  6.3*   < >  --   --   --    PLT  --   --   --   --   --   --  58*  --   --   --   --    NA  --   --   --  136  --   --   --   --  134*  --  142   POTASSIUM  --   --   --  3.5  --   --   --   --  3.7  --  3.7   CHLORIDE  --   --   --   --   --   --   --   --  95*  --  101   CO2  --   --   --   --   --   --   --   --  29  --  30*   BUN  --   --   --   --   --   --   --   --  74.2*  --  61.1*   CR  --   --   --   --   --   --   --   --  3.06*  --  2.72*   ANIONGAP  --   --   --   --   --   --   --   --  10  --  11   KELLY  --   --   --   --   --   --   --   --  8.5*  --  8.6   * 120*  --  121  --    < >  --    < > 154*   < > 162*   ALBUMIN  --   --   --   --   --   --   --   --  2.2*  --  2.4*    < > = values in this interval not displayed.     Medications     dextrose       - MEDICATION INSTRUCTIONS -         sodium chloride 0.9%  300  mL Hemodialysis Machine During Dialysis/CRRT (from stock)     acetaminophen  975 mg Per Feeding Tube Q8H BIJU    Or     acetaminophen  650 mg Rectal Q8H BIJU     acetylcysteine  2 mL Nebulization 4x daily     albuterol  2.5 mg Nebulization 4x daily     amoxicillin-clavulanate  500 mg Per Feeding Tube QPM     amoxicillin-clavulanate  500 mg Per Feeding Tube Once per day on Mon Wed Fri     [Held by provider] apixaban ANTICOAGULANT  5 mg Oral or Feeding Tube BID     B and C vitamin Complex with folic acid  5 mL Per Feeding Tube Daily     carboxymethylcellulose PF  1 drop Both Eyes TID     chlorhexidine  15 mL Mouth/Throat BID     epoetin mirta-epbx  10,000 Units Subcutaneous Once per day on Mon Wed Fri     fluconazole  200 mg Per Feeding Tube At Bedtime     folic acid  1 mg Oral or Feeding Tube Daily     heparin Lock (1000 units/mL High concentration)  1,900 Units Intracatheter During Dialysis/CRRT (from stock)     heparin Lock (1000 units/mL High concentration)  1,900 Units Intracatheter During Dialysis/CRRT (from stock)     insulin aspart  1-6 Units Subcutaneous Daily     insulin glargine  20 Units Subcutaneous Daily     lacosamide  100 mg Oral or Feeding Tube BID     lactulose  20 g Oral or Feeding Tube BID     levETIRAcetam  1,000 mg Oral or Feeding Tube Q24H     Melatonin  6 mg Oral or NG Tube At Bedtime     midodrine  10 mg Oral During Dialysis/CRRT (from stock)     midodrine  5 mg Oral or Feeding Tube TID w/meals     mineral oil-hydrophilic petrolatum   Topical Daily     nystatin  500,000 Units Swish & Swallow 4x Daily     pantoprazole  40 mg Per Feeding Tube BID     QUEtiapine  75 mg Oral or Feeding Tube At Bedtime     rifaximin  550 mg Per Feeding Tube BID     sodium chloride (PF)  10-30 mL Intracatheter Q8H     tacrolimus  1 mg Oral or Feeding Tube BID

## 2023-01-07 NOTE — PROGRESS NOTES
Pulmonary update:    CTA images reviewed and grossly unremarkable except for large amount of ascites.  No active site of bleeding identified.      - Ok to resume phase 2 weans(order placed).  Discussed with RT over the phone.     Henry Fenton CNP  Pulmonary Medicine  [facility]  Pager [phone]  Office [phone]

## 2023-01-07 NOTE — PLAN OF CARE
Problem: Mechanical Ventilation Invasive  Goal: Optimal Device Function  Intervention: Optimize Device Care and Function  Recent Flowsheet Documentation  Taken 1/7/2023 1642 by Tonie Dykes RN  Oral Care: swabbed with antiseptic solution  Taken 1/7/2023 1225 by Tonie Dykes RN  Oral Care: swabbed with antiseptic solution  Taken 1/7/2023 0905 by Tonie Dykes RN  Oral Care:   lip/mouth moisturizer applied   swabbed with antiseptic solution     Problem: Mechanical Ventilation Invasive  Goal: Absence of Ventilator-Induced Lung Injury  Intervention: Prevent Ventilator-Associated Pneumonia  Recent Flowsheet Documentation  Taken 1/7/2023 1642 by Tonie Dykes RN  Oral Care: swabbed with antiseptic solution  Taken 1/7/2023 1225 by Tonie Dykes RN  Oral Care: swabbed with antiseptic solution  Taken 1/7/2023 0905 by Tonie Dykes RN  Oral Care:   lip/mouth moisturizer applied   swabbed with antiseptic solution  Head of Bed (HOB) Positioning: HOB at 30 degrees     Problem: Mechanical Ventilation Invasive  Goal: Optimal Device Function  Intervention: Optimize Device Care and Function  Recent Flowsheet Documentation  Taken 1/7/2023 1642 by Tonie Dykes RN  Oral Care: swabbed with antiseptic solution  Taken 1/7/2023 1225 by Tonie Dykes RN  Oral Care: swabbed with antiseptic solution  Taken 1/7/2023 0905 by Tonie Dykes RN  Oral Care:   lip/mouth moisturizer applied   swabbed with antiseptic solution   Goal Outcome Evaluation:

## 2023-01-07 NOTE — PLAN OF CARE
Problem: Mechanical Ventilation Invasive  Goal: Effective Communication  Outcome: Adequate for Care Transition   Goal Outcome Evaluation:       RT PROGRESS NOTE     DATA:     CURRENT SETTINGS: Vent Mode: CMV/AC  (Continuous Mandatory Ventilation/ Assist Control)  FiO2 (%): 25 %  Resp Rate (Set): 12 breaths/min  Tidal Volume (Set, mL): 450 mL  PEEP (cm H2O): 5 cmH2O  Resp: 21                 TRACH TYPE / SIZE:  #6 Shiley XLT Pro. Placed in 11/29             MODE:   AC              FIO2:   25%     ACTION:             THERAPIES:   Albuterol/Mucomyst X 1             SUCTION:                           FREQUENCY:   X 3                        AMOUNT:   small to moderate                        CONSISTENCY:   thick                        COLOR:   white             SPONTANEOUS COUGH EFFORT/STRENGTH OF EFFORT (not elicited by suctioning): fair                              WEANING PHASE:   on hold.                        WEAN MODE:                           WEAN TIME:                           END WEAN REASON:        RESPONSE:             BS:   rhonchi             VITAL SIGNS:   Blood pressure 117/71, pulse 97, temperature 98.1  F (36.7  C), temperature source Oral, resp. rate 21, weight 92.7 kg (204 lb 6.4 oz), SpO2 99 %.               EMOTIONAL NEEDS / CONCERNS:                  RISK FOR SELF DECANNULATION:                          RISK DUE TO:                          INTERVENTION/S IN PLACE IS/ARE:         NOTE / PLAN:   pt. Was sent to [facility] for CT and came back safe. Pt. Is on full vent support. Will continue monitoring.

## 2023-01-08 LAB
BLD PROD TYP BPU: NORMAL
BLD PROD TYP BPU: NORMAL
BLOOD COMPONENT TYPE: NORMAL
BLOOD COMPONENT TYPE: NORMAL
CODING SYSTEM: NORMAL
CODING SYSTEM: NORMAL
CROSSMATCH: NORMAL
CROSSMATCH: NORMAL
GLUCOSE BLDC GLUCOMTR-MCNC: 125 MG/DL (ref 70–99)
HGB BLD-MCNC: 7.1 G/DL (ref 13.3–17.7)
PHOSPHATE SERPL-MCNC: 3.3 MG/DL (ref 2.5–4.5)
UNIT ABO/RH: NORMAL
UNIT ABO/RH: NORMAL
UNIT NUMBER: NORMAL
UNIT NUMBER: NORMAL
UNIT STATUS: NORMAL
UNIT STATUS: NORMAL
UNIT TYPE ISBT: 5100
UNIT TYPE ISBT: 5100

## 2023-01-08 PROCEDURE — 250N000013 HC RX MED GY IP 250 OP 250 PS 637: Performed by: NURSE PRACTITIONER

## 2023-01-08 PROCEDURE — 999N000157 HC STATISTIC RCP TIME EA 10 MIN

## 2023-01-08 PROCEDURE — 250N000012 HC RX MED GY IP 250 OP 636 PS 637: Performed by: HOSPITALIST

## 2023-01-08 PROCEDURE — 250N000009 HC RX 250: Performed by: NURSE PRACTITIONER

## 2023-01-08 PROCEDURE — 250N000013 HC RX MED GY IP 250 OP 250 PS 637: Performed by: HOSPITALIST

## 2023-01-08 PROCEDURE — 84100 ASSAY OF PHOSPHORUS: CPT | Performed by: HOSPITALIST

## 2023-01-08 PROCEDURE — 94640 AIRWAY INHALATION TREATMENT: CPT | Mod: 76

## 2023-01-08 PROCEDURE — 99233 SBSQ HOSP IP/OBS HIGH 50: CPT | Performed by: HOSPITALIST

## 2023-01-08 PROCEDURE — 120N000017 HC R&B RESPIRATORY CARE

## 2023-01-08 PROCEDURE — 250N000009 HC RX 250: Performed by: HOSPITALIST

## 2023-01-08 PROCEDURE — 85018 HEMOGLOBIN: CPT | Performed by: HOSPITALIST

## 2023-01-08 PROCEDURE — 94003 VENT MGMT INPAT SUBQ DAY: CPT

## 2023-01-08 PROCEDURE — 999N000009 HC STATISTIC AIRWAY CARE

## 2023-01-08 PROCEDURE — 94640 AIRWAY INHALATION TREATMENT: CPT

## 2023-01-08 RX ADMIN — CHLORHEXIDINE GLUCONATE 0.12% ORAL RINSE 15 ML: 1.2 LIQUID ORAL at 22:41

## 2023-01-08 RX ADMIN — ACETAMINOPHEN 975 MG: 325 TABLET, FILM COATED ORAL at 22:42

## 2023-01-08 RX ADMIN — LACOSAMIDE 100 MG: 10 SOLUTION ORAL at 08:35

## 2023-01-08 RX ADMIN — RIFAXIMIN 550 MG: 550 TABLET ORAL at 08:36

## 2023-01-08 RX ADMIN — Medication 5 ML: at 08:36

## 2023-01-08 RX ADMIN — FOLIC ACID 1 MG: 1 TABLET ORAL at 08:36

## 2023-01-08 RX ADMIN — Medication 10 ML: at 08:47

## 2023-01-08 RX ADMIN — CHLORHEXIDINE GLUCONATE 0.12% ORAL RINSE 15 ML: 1.2 LIQUID ORAL at 08:35

## 2023-01-08 RX ADMIN — MIDODRINE HYDROCHLORIDE 5 MG: 5 TABLET ORAL at 12:48

## 2023-01-08 RX ADMIN — ACETYLCYSTEINE 2 ML: 200 SOLUTION ORAL; RESPIRATORY (INHALATION) at 08:02

## 2023-01-08 RX ADMIN — ANORECTAL OINTMENT: 15.7; .44; 24; 20.6 OINTMENT TOPICAL at 08:36

## 2023-01-08 RX ADMIN — Medication 40 MG: at 08:35

## 2023-01-08 RX ADMIN — LEVETIRACETAM 1000 MG: 100 SOLUTION ORAL at 22:40

## 2023-01-08 RX ADMIN — ALBUTEROL SULFATE 2.5 MG: 2.5 SOLUTION RESPIRATORY (INHALATION) at 19:26

## 2023-01-08 RX ADMIN — TACROLIMUS 1 MG: 5 CAPSULE ORAL at 08:36

## 2023-01-08 RX ADMIN — WHITE PETROLATUM: 1.75 OINTMENT TOPICAL at 08:37

## 2023-01-08 RX ADMIN — Medication 1 DROP: at 08:36

## 2023-01-08 RX ADMIN — Medication 1 DROP: at 14:44

## 2023-01-08 RX ADMIN — QUETIAPINE 75 MG: 25 TABLET ORAL at 22:39

## 2023-01-08 RX ADMIN — RIFAXIMIN 550 MG: 550 TABLET ORAL at 23:09

## 2023-01-08 RX ADMIN — NYSTATIN 500000 UNITS: 100000 SUSPENSION ORAL at 17:49

## 2023-01-08 RX ADMIN — NYSTATIN 500000 UNITS: 100000 SUSPENSION ORAL at 12:49

## 2023-01-08 RX ADMIN — LACTULOSE 20 G: 20 SOLUTION ORAL at 22:43

## 2023-01-08 RX ADMIN — ALBUTEROL SULFATE 2.5 MG: 2.5 SOLUTION RESPIRATORY (INHALATION) at 15:28

## 2023-01-08 RX ADMIN — INSULIN GLARGINE 20 UNITS: 100 INJECTION, SOLUTION SUBCUTANEOUS at 08:36

## 2023-01-08 RX ADMIN — ALBUTEROL SULFATE 2.5 MG: 2.5 SOLUTION RESPIRATORY (INHALATION) at 08:02

## 2023-01-08 RX ADMIN — NYSTATIN 500000 UNITS: 100000 SUSPENSION ORAL at 22:41

## 2023-01-08 RX ADMIN — Medication 6 MG: at 22:38

## 2023-01-08 RX ADMIN — ACETAMINOPHEN 975 MG: 325 TABLET, FILM COATED ORAL at 14:44

## 2023-01-08 RX ADMIN — Medication 1 DROP: at 22:42

## 2023-01-08 RX ADMIN — ACETYLCYSTEINE 2 ML: 200 SOLUTION ORAL; RESPIRATORY (INHALATION) at 19:26

## 2023-01-08 RX ADMIN — ALBUTEROL SULFATE 2.5 MG: 2.5 SOLUTION RESPIRATORY (INHALATION) at 11:34

## 2023-01-08 RX ADMIN — NYSTATIN 500000 UNITS: 100000 SUSPENSION ORAL at 08:35

## 2023-01-08 RX ADMIN — FLUCONAZOLE 200 MG: 200 TABLET ORAL at 23:10

## 2023-01-08 RX ADMIN — ACETYLCYSTEINE 2 ML: 200 SOLUTION ORAL; RESPIRATORY (INHALATION) at 11:35

## 2023-01-08 RX ADMIN — Medication 40 MG: at 22:36

## 2023-01-08 RX ADMIN — ACETYLCYSTEINE 2 ML: 200 SOLUTION ORAL; RESPIRATORY (INHALATION) at 15:28

## 2023-01-08 RX ADMIN — AMOXICILLIN AND CLAVULANATE POTASSIUM 500 MG: 400; 57 POWDER, FOR SUSPENSION ORAL at 22:34

## 2023-01-08 RX ADMIN — ACETAMINOPHEN 975 MG: 325 TABLET, FILM COATED ORAL at 06:26

## 2023-01-08 RX ADMIN — LACOSAMIDE 100 MG: 10 SOLUTION ORAL at 22:37

## 2023-01-08 RX ADMIN — TACROLIMUS 1 MG: 5 CAPSULE ORAL at 17:49

## 2023-01-08 RX ADMIN — LACTULOSE 20 G: 20 SOLUTION ORAL at 08:35

## 2023-01-08 ASSESSMENT — ACTIVITIES OF DAILY LIVING (ADL)
ADLS_ACUITY_SCORE: 58
ADLS_ACUITY_SCORE: 58
ADLS_ACUITY_SCORE: 60
ADLS_ACUITY_SCORE: 62
ADLS_ACUITY_SCORE: 56
ADLS_ACUITY_SCORE: 62
ADLS_ACUITY_SCORE: 62

## 2023-01-08 NOTE — PLAN OF CARE
Problem: Plan of Care - These are the overarching goals to be used throughout the patient stay.    Goal: Optimal Comfort and Wellbeing  Outcome: Progressing  Intervention: Provide Person-Centered Care  Recent Flowsheet Documentation  Taken 1/8/2023 0100 by Noemi Vidal RN  Trust Relationship/Rapport: care explained     Problem: Pain Acute  Goal: Optimal Pain Control and Function  Outcome: Progressing  Intervention: Prevent or Manage Pain  Recent Flowsheet Documentation  Taken 1/8/2023 0100 by Noemi Vidal RN  Bowel Elimination Promotion: (Rectal tube) --  Medication Review/Management: medications reviewed     Problem: Malnutrition  Goal: Improved Nutritional Intake  Outcome: Progressing  Intervention: Promote and Optimize Oral Intake  Recent Flowsheet Documentation  Taken 1/8/2023 0100 by Noemi Vidal RN  Nutrition Interventions: (TF/lots of gas) --  Intervention: Optimize Nutrition Delivery  Recent Flowsheet Documentation  Taken 1/8/2023 0100 by Noemi Vidal RN  Nutrition Support Management: weight trending reviewed   Goal Outcome Evaluation:       Pt alert and oriented. Denies for any pain or respiratory distress. Continues with telemetry. Slept most of the night. BS checked this morning 125. Rectal tube bag changed output 200. Will cont to monitor      .me

## 2023-01-08 NOTE — PLAN OF CARE
Problem: Mechanical Ventilation Invasive  Goal: Optimal Device Function  Outcome: Progressing  Intervention: Optimize Device Care and Function  Recent Flowsheet Documentation  Taken 1/7/2023 1631 by Wilner Beyer, RT  Airway Safety Measures: all equipment/monitors on and audible  Taken 1/7/2023 1500 by Wilner Beyer, RT  Airway Safety Measures: all equipment/monitors on and audible  Taken 1/7/2023 1344 by Wilner Beyer, RT  Airway Safety Measures: all equipment/monitors on and audible  Taken 1/7/2023 1138 by Wilner Beyer, RT  Airway Safety Measures: all equipment/monitors on and audible  Taken 1/7/2023 0805 by Wilner Beyer, RT  Airway Safety Measures: all equipment/monitors on and audible  Taken 1/7/2023 0738 by Wilner Beyer, RT  Airway Safety Measures: all equipment/monitors on and audible  Goal: Mechanical Ventilation Liberation  Outcome: Progressing   RT PROGRESS NOTE     DATA:     CURRENT SETTINGS:ac/12/450/+5, 25%             TRACH TYPE / SIZE:  # 6 shiley xlt prox, 11/29             MODE:   AC MODE             FIO2:   25%     ACTION:             THERAPIES:   ALB/MUCCO QID             SUCTION:                           FREQUENCY:   X5                         AMOUNT:   small/mod                        CONSISTENCY:   thick                        COLOR:   pale/yellow             SPONTANEOUS COUGH EFFORT/STRENGTH OF EFFORT (not elicited by suctioning):                               WEANING PHASE:   2                        WEAN MODE:    30%tm +30L                        WEAN TIME:  since 0802                        END WEAN REASON:   cont      RESPONSE:             BS:   crs             VITAL SIGNS:   %, RR 20-22             EMOTIONAL NEEDS / CONCERNS:               RISK FOR SELF DECANNULATION:                         RISK DUE TO:                         INTERVENTION/S IN PLACE IS/ARE:       NOTE / PLAN:   Goal Outcome Evaluation:         Placed pt on 3%tm with 30L ,  tolerating well, pmv trail attempted  while pt up in chair, pt complained difficult breathing and requested to take it off,  Plan-  Wean tm slowly in to the night , consider downsizing the trach

## 2023-01-08 NOTE — PROGRESS NOTES
[facility]    Medicine Progress Note - Hospitalist Service    Date of Admission:  12/29/2022    History of Present Illness  Blanco Osborne is an 57 year old male who is transferred from [facility] for rehab and continuation of IV antibiotic treatment.  Patient has multiple medical problems including history of liver transplant 2016, PAF on chronic anticoagulation, history of DM2, CVA, HTN, CHRISTIAN on BiPAP, and history of alcohol abuse in the past causing liver damage ending with liver transplant.  About 6 weeks ago he had respiratory arrest and was diagnosed with infection with parainfluenza and strep pneumoniae and acute on chronic renal insufficiency ending with CRF needing 3/week hemodialysis.  During this period he was diagnosed with cirrhosis of transplanted liver and hyperammonia.  Currently he is on Lactulose and Rifaximin.        On 12/14/22, due to CIP, he was transferred to [facility] for the first time with Trach, PEG and Rt chest tube.    On 12/16/2022 patient was transferred back to [facility] due to respiratory insufficiency secondary to hydropneumothorax and drainage of 900 cc bloody pleural effusion, bloody stool and transfusion of 2 units PRBC.      On 12/20/2022 he had another episode of PEA arrest followed by CPR x2 minutes and possibly had seizure activity in [facility]. 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  He has been on ventilator and has improved to trach dome tolerating up to 6 hours so far. He had chest tube which was removed.    He had SBP with growth of lactobacillus and is currently on Unasyn which will be switched to Augmentin orally through 1/12/2023.    Pleural effusion grew Candida and is on micafungin.  He is also on  due to cirrhosis and hyperammonia.  He is anemic and is on Retacrit.   He is on lacosamide and Keppra due to history of seizure.  There is  question of him drinking again causing him cirrhosis of the transplanted liver.    Assessment & Plan   Principal Problem:    Acute on chronic respiratory failure with hypoxia (H)  Active Problems:    Acquired immunocompromised state (H)    Cirrhosis of liver (H)    NAFLD (nonalcoholic fatty liver disease)    Liver transplanted (H)    Immunosuppression (H)    Acute kidney injury (H)    Spontaneous bacterial peritonitis (H)    Critical illness myopathy    History of sudden cardiac arrest, PEA    Dependent on hemodialysis (H)    Pulmonary:  Acute now chronic respiratory failure requiring tracheostomy..  Initial event was parainfluenza and strep pneumo pneumonia.  Had failed extubation x2 and tracheostomy performed last hospitalization.  Had additional complications of bilateral pneumothoraces.  (Most recently was a hydropneumothorax where fluid grew Candida..  Chest tube was placed and removed during this hospitalization.  Had decent tolerance for pressure support and trach dome but after few hours and trach dome 1227 had a pretty significant episode of desaturation.   Plan:  1.  We will alternate trach dome and pressure support and try to wean.  Pulmonary following.   2.  At least 4 weeks of antimicrobial for Candida detailed in ID section    Cardiovascular system:  PEA arrest prior to his previous admission and 1 episode of PEA associated with desaturation on 1220.  No structural cardiac disease but does have A. Fib which neurologist thought might have been contributory to his embolic strokes.  Anticoagulation been restarted with stable labs although high risk for bleeding seconday to thrombocytopenia.  Also has developed baseline hypotension and is on midodrine 10 mg 3 times daily.  Plan:  Continue Eliquis.  Continue current midodrine dose     CNS:  1. Seizure after hypoxic event.  This is in the context of baseline multifactorial encephalopathy secondary to his ongoing critical illness and prior cardiac arrests and  prior strokes and cirrhosis with hepatic encephalopathy. MRI of head of 12/20/22 reveals no PRES or leptomeningeal enhancement but scattered.  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   Plan:  1.  Keppra and vimpat indefinitely.  Neurology follow-up.  2.  Anticoagulation indefinitely for secondary stroke prevention.  On hold since 1/6/2023 due to tracheal bleeding during suction.  Patient is currently in sinus rhythm.  And telemetry and monitored.  3. Tylenol TID for headache.        Renal/Electrolytes:  1.  ESRD: Now on iHD.  No return of renal function.  - MWF schedule      ID:  1. 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 4.  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  Have had some conversations within our group and with transplant ID and general infectious disease.  General consensus is that ganciclovir probably could be discontinued  2. H/o Strep/Parainfluenza infection last hospitalization. Completed treatment course  3. 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  4.  Lactobacillus growing in the broth 4 days after paracentesis.  Cell count was very low.  5.  Candida in left pleural fluid cultures.  Sensitivities not resulted.  6.  Immunosuppressed on tacrolimus.  Discussed with transplant service at the [facility] who felt that given the absence of rejection on his most recent biopsy and ongoing issues with infection continuing with 1 twice daily tacrolimus is the best current option.  They have seen his subtherapeutic trough levels.  Plan:  1.  Confirm with transplant ID whether or not we can stop ganciclovir.  2.  Unasyn for 2 weeks total.  Change to Augmentin through 1/12/2023.  3.  At least 4 weeks of antimicrobial for the Candida.  If sensitive to fluconazole could de-escalate from micafungin.  4.  Continue tacrolimus as above and we are tapering steroids     GI/:  Post transplant cirrhosis.  Main manifestations of this are hepatic encephalopathy and ascites.   Hepatologist at [facility] felt that most likely reason for the cirrhosis was metabolic syndrome or alcohol intake.  There was no evidence of rejection on biopsy and there was some evidence of regeneration. Paracentesis done on 12/22/2022 with lack of bacillus in the broth indicating SBP low cell count was very low  Plan:  1.  Intermittent paracentesis as needed.  2.  Getting 1 mg  tacrolimus BID and tapering steroids   3. Lactulose and Rifaximin as above  4.  Treat SBP for 2 weeks.  5.  He has an appointment in April with Dr. Simms if he is out of the hospital.     Endocrine:  1. DM.  Titrating insulin. He needs supplemental coverage.  2. He is on steroids which we are weaning  Plan:  Continue with current regimen.      Nutrition:  At goal tube feeds via PEG tube     Heme/Onc:  1. Pancytopenia due cirrhosis, ESRD and hypersplenism. WBC count has improved.  However he needs frequent PRBC transfusion due Hb <7.0.  2. Patient is on anticoagulation for PAF.  Currently in sinus rhythm.  Is off anticoagulation due to tracheal bleeding.  Plan:  1.  Eliquis is on hold for now.     ICU prophylaxis:      DVT PPX: Eliquis on hold due to tracheal bleeding.  Patient is in sinus rhythm     Restraints needed: No     Stress ulcer: IV PPI     Central line: Needed for IV access/Meds.     Code Status: Full     Diet: Adult Formula Drip Feeding: Continuous Novasource Renal; Gastrostomy; Goal Rate: 60; mL/hr    DVT Prophylaxis: DOAC  Nixon Catheter: Not present  Central Lines: PRESENT      Cardiac Monitoring: ACTIVE order. Indication: Tachyarrhythmias, acute (48 hours)  Code Status: Full Code      Disposition Plan     Expected Discharge Date: 01/12/2023    Discharge Delays: Complex Discharge    Discharge Comments: Vent. Trach. Phase 1. PEG.  Rectal Tube.      The patient's care was discussed with the Bedside Nurse, Patient and Patient's Family.    Edgar Fritz MD  Hospitalist Service  [facility]  Securely message with the Vocera Web  Console (learn more here)  Text page via [facility] Paging/Directory     Clinically Significant Risk Factors              # Hypoalbuminemia: Lowest albumin = 1.8 g/dL at 12/29/2022  4:40 AM, will monitor as appropriate             # Severe Malnutrition: based on nutrition assessment      _____________________________________________________________________    Interval History   Patient is in no distress. He has tracheostomy and is on vent.   He is on phase 2 of vent weaning.  His latest platelet count is down to 58K.  He had 1 unit of PRBC transfusion 1/6/23.  Today's hemoglobin is 7.1 g/dL. Most likely will have Hb<7.0 by tomorrow. Prepare 2 U of PRBC today and will transfuse tomprrow if Hb is 7 g/dL or less.   Due to history of PAF and embolic CVA he is on chronic anticoagulation with Eliquis. He is in sinus rhythm.  I placed him on telemetry and watch his rhythm.  On 1/6/2023 I held Eliquis.  He has some bleeding with bronchial suction which hopefully resolves while holding Eliquis.  Check hemoglobin daily.  Currently is a stable.  Pulmonology following.    He might benefit from a paracentesis in the next couple of days.    Data reviewed today: I reviewed all medications, new labs and imaging results over the last 24 hours. I personally reviewed no images or EKG's today.    Physical Exam   Vital Signs: Temp: 97.8  F (36.6  C) Temp src: Oral BP: 136/88 Pulse: 105   Resp: 22 SpO2: 100 % O2 Device: Trach dome Oxygen Delivery: 30 LPM  Weight: 203 lbs 12.8 oz  Patient is sitting in bed.  Is in no acute distress.  Looks pale and chronically ill.  He communicates and is pleasant and cooperative.  He is on ventilator through tracheostomy  HEENT: Nl oral mucosal moisture.  Pale mucosa, no icterus.  Neck: Tracheostomy in place.  Right external jugular CVC.  Heart: Regular rate and rhythm, no murmur  Abdomen: Large abdomen due to large ascites.  Nontender.  No mass or organomegaly noted.  Patient has rectal tube.  PEG tube in  place.  : Deferred  Extremities: PICC line in right brachial artery.  No cyanosis.  Neurological exam: Patient is alert and oriented x2.  Has normal behavior.  Gross cranial nerve neurological exam did not show any major abnormality.  Left  is weaker than the right otherwise no focal neurological deficit noted in limited neurological exam. Gait and station were not examined.    Data   Recent Labs   Lab 01/08/23  0630 01/08/23  0604 01/07/23  1938 01/07/23  0551 01/06/23  0631 01/06/23  0543 01/05/23  2352 01/04/23  0629 01/04/23  0623 01/02/23  0813 01/02/23  0527   HGB  --  7.1*  --   --   --  6.8* 7.3*   < > 6.3*  --   --    PLT  --   --   --   --   --   --   --   --  58*  --   --    NA  --   --   --   --   --   --  136  --   --   --  134*   POTASSIUM  --   --   --   --   --   --  3.5  --   --   --  3.7   CHLORIDE  --   --   --   --   --   --   --   --   --   --  95*   CO2  --   --   --   --   --   --   --   --   --   --  29   BUN  --   --   --   --   --   --   --   --   --   --  74.2*   CR  --   --   --   --   --   --   --   --   --   --  3.06*   ANIONGAP  --   --   --   --   --   --   --   --   --   --  10   KELLY  --   --   --   --   --   --   --   --   --   --  8.5*   *  --  135* 137*   < >  --  121   < >  --    < > 154*   ALBUMIN  --   --   --   --   --   --   --   --   --   --  2.2*    < > = values in this interval not displayed.     Medications     dextrose       - MEDICATION INSTRUCTIONS -         sodium chloride 0.9%  300 mL Hemodialysis Machine During Dialysis/CRRT (from stock)     acetaminophen  975 mg Per Feeding Tube Q8H BIJU    Or     acetaminophen  650 mg Rectal Q8H BIJU     acetylcysteine  2 mL Nebulization 4x daily     albuterol  2.5 mg Nebulization 4x daily     amoxicillin-clavulanate  500 mg Per Feeding Tube QPM     amoxicillin-clavulanate  500 mg Per Feeding Tube Once per day on Mon Wed Fri     [Held by provider] apixaban ANTICOAGULANT  5 mg Oral or Feeding Tube BID     B and C vitamin  Complex with folic acid  5 mL Per Feeding Tube Daily     carboxymethylcellulose PF  1 drop Both Eyes TID     chlorhexidine  15 mL Mouth/Throat BID     epoetin mirta-epbx  10,000 Units Subcutaneous Once per day on Mon Wed Fri     fluconazole  200 mg Per Feeding Tube At Bedtime     folic acid  1 mg Oral or Feeding Tube Daily     heparin Lock (1000 units/mL High concentration)  1,900 Units Intracatheter During Dialysis/CRRT (from stock)     heparin Lock (1000 units/mL High concentration)  1,900 Units Intracatheter During Dialysis/CRRT (from stock)     insulin aspart  1-6 Units Subcutaneous Daily     insulin glargine  20 Units Subcutaneous Daily     lacosamide  100 mg Oral or Feeding Tube BID     lactulose  20 g Oral or Feeding Tube BID     levETIRAcetam  1,000 mg Oral or Feeding Tube Q24H     Melatonin  6 mg Oral or NG Tube At Bedtime     midodrine  10 mg Oral During Dialysis/CRRT (from stock)     midodrine  5 mg Oral or Feeding Tube TID w/meals     mineral oil-hydrophilic petrolatum   Topical Daily     nystatin  500,000 Units Swish & Swallow 4x Daily     pantoprazole  40 mg Per Feeding Tube BID     QUEtiapine  75 mg Oral or Feeding Tube At Bedtime     rifaximin  550 mg Per Feeding Tube BID     sodium chloride (PF)  10-30 mL Intracatheter Q8H     tacrolimus  1 mg Oral or Feeding Tube BID

## 2023-01-09 ENCOUNTER — APPOINTMENT (OUTPATIENT)
Dept: PHYSICAL THERAPY | Facility: CLINIC | Age: 58
DRG: 207 | End: 2023-01-09
Attending: HOSPITALIST
Payer: COMMERCIAL

## 2023-01-09 ENCOUNTER — APPOINTMENT (OUTPATIENT)
Dept: SPEECH THERAPY | Facility: CLINIC | Age: 58
DRG: 207 | End: 2023-01-09
Attending: HOSPITALIST
Payer: COMMERCIAL

## 2023-01-09 LAB
ANION GAP SERPL CALCULATED.3IONS-SCNC: 13 MMOL/L (ref 7–15)
BASE EXCESS BLDV CALC-SCNC: 7.6 MMOL/L (ref -8.1–1.9)
BUN SERPL-MCNC: 76 MG/DL (ref 6–20)
CA-I BLD-MCNC: 1.2 MMOL/L (ref 1.11–1.3)
CALCIUM SERPL-MCNC: 9.2 MG/DL (ref 8.6–10)
CHLORIDE SERPL-SCNC: 94 MMOL/L (ref 98–107)
CREAT SERPL-MCNC: 3.14 MG/DL (ref 0.67–1.17)
DEPRECATED HCO3 PLAS-SCNC: 27 MMOL/L (ref 22–29)
GFR SERPL CREATININE-BSD FRML MDRD: 22 ML/MIN/1.73M2
GLUCOSE BLD-MCNC: 116 MG/DL (ref 70–125)
GLUCOSE BLDC GLUCOMTR-MCNC: 169 MG/DL (ref 70–99)
GLUCOSE SERPL-MCNC: 159 MG/DL (ref 70–99)
HCO3 BLDV-SCNC: 30 MMOL/L (ref 24–30)
HGB BLD-MCNC: 7.3 G/DL (ref 13.3–17.7)
HGB BLD-MCNC: 9.9 G/DL (ref 13.3–17.7)
LACTATE BLD-SCNC: 1 MMOL/L (ref 0.7–2)
MAGNESIUM SERPL-MCNC: 2.2 MG/DL (ref 1.7–2.3)
PCO2 BLDV: 51 MM HG (ref 35–50)
PH BLDV: 7.41 [PH] (ref 7.35–7.45)
PHOSPHATE SERPL-MCNC: 3.8 MG/DL (ref 2.5–4.5)
PO2 BLDV: 27 MM HG (ref 25–47)
POTASSIUM BLD-SCNC: 3.3 MMOL/L (ref 3.5–5)
POTASSIUM SERPL-SCNC: 3.7 MMOL/L (ref 3.4–5.3)
SATV LHE POCT: 48.7 % (ref 70–75)
SODIUM BLD-SCNC: 137 MMOL/L (ref 136–145)
SODIUM SERPL-SCNC: 134 MMOL/L (ref 136–145)
TACROLIMUS BLD-MCNC: 3.7 UG/L (ref 5–15)
TME LAST DOSE: ABNORMAL H
TME LAST DOSE: ABNORMAL H

## 2023-01-09 PROCEDURE — 999N000157 HC STATISTIC RCP TIME EA 10 MIN

## 2023-01-09 PROCEDURE — 999N000009 HC STATISTIC AIRWAY CARE

## 2023-01-09 PROCEDURE — 80048 BASIC METABOLIC PNL TOTAL CA: CPT | Performed by: HOSPITALIST

## 2023-01-09 PROCEDURE — 250N000012 HC RX MED GY IP 250 OP 636 PS 637: Performed by: HOSPITALIST

## 2023-01-09 PROCEDURE — 84100 ASSAY OF PHOSPHORUS: CPT | Performed by: HOSPITALIST

## 2023-01-09 PROCEDURE — 82803 BLOOD GASES ANY COMBINATION: CPT

## 2023-01-09 PROCEDURE — 97110 THERAPEUTIC EXERCISES: CPT | Mod: GP

## 2023-01-09 PROCEDURE — 0B21XFZ CHANGE TRACHEOSTOMY DEVICE IN TRACHEA, EXTERNAL APPROACH: ICD-10-PCS | Performed by: NURSE PRACTITIONER

## 2023-01-09 PROCEDURE — 94003 VENT MGMT INPAT SUBQ DAY: CPT

## 2023-01-09 PROCEDURE — 97530 THERAPEUTIC ACTIVITIES: CPT | Mod: GP

## 2023-01-09 PROCEDURE — 250N000011 HC RX IP 250 OP 636: Performed by: NURSE PRACTITIONER

## 2023-01-09 PROCEDURE — 94640 AIRWAY INHALATION TREATMENT: CPT

## 2023-01-09 PROCEDURE — 99232 SBSQ HOSP IP/OBS MODERATE 35: CPT | Performed by: PHYSICIAN ASSISTANT

## 2023-01-09 PROCEDURE — 120N000017 HC R&B RESPIRATORY CARE

## 2023-01-09 PROCEDURE — 999N000258 HC STATISTIC TRACH CHANGE

## 2023-01-09 PROCEDURE — 83735 ASSAY OF MAGNESIUM: CPT | Performed by: HOSPITALIST

## 2023-01-09 PROCEDURE — 634N000001 HC RX 634: Performed by: PHYSICIAN ASSISTANT

## 2023-01-09 PROCEDURE — 99232 SBSQ HOSP IP/OBS MODERATE 35: CPT | Performed by: HOSPITALIST

## 2023-01-09 PROCEDURE — 80197 ASSAY OF TACROLIMUS: CPT | Performed by: HOSPITALIST

## 2023-01-09 PROCEDURE — 250N000013 HC RX MED GY IP 250 OP 250 PS 637: Performed by: HOSPITALIST

## 2023-01-09 PROCEDURE — 82330 ASSAY OF CALCIUM: CPT

## 2023-01-09 PROCEDURE — 90935 HEMODIALYSIS ONE EVALUATION: CPT

## 2023-01-09 PROCEDURE — 94640 AIRWAY INHALATION TREATMENT: CPT | Mod: 76

## 2023-01-09 PROCEDURE — 94003 VENT MGMT INPAT SUBQ DAY: CPT | Performed by: NURSE PRACTITIONER

## 2023-01-09 PROCEDURE — 258N000003 HC RX IP 258 OP 636: Performed by: NURSE PRACTITIONER

## 2023-01-09 PROCEDURE — 92507 TX SP LANG VOICE COMM INDIV: CPT | Mod: GN

## 2023-01-09 PROCEDURE — 250N000009 HC RX 250: Performed by: HOSPITALIST

## 2023-01-09 PROCEDURE — 250N000013 HC RX MED GY IP 250 OP 250 PS 637: Performed by: NURSE PRACTITIONER

## 2023-01-09 PROCEDURE — 250N000009 HC RX 250: Performed by: NURSE PRACTITIONER

## 2023-01-09 PROCEDURE — 85018 HEMOGLOBIN: CPT | Performed by: HOSPITALIST

## 2023-01-09 PROCEDURE — 999N000253 HC STATISTIC WEANING TRIALS

## 2023-01-09 PROCEDURE — 272N000063 HC CIRCUIT HUMID FACE/TRACH MSK

## 2023-01-09 RX ORDER — VIT B COMP NO.3/FOLIC/C/BIOTIN 1 MG-60 MG
1 TABLET ORAL DAILY
Status: DISCONTINUED | OUTPATIENT
Start: 2023-01-10 | End: 2023-01-21 | Stop reason: HOSPADM

## 2023-01-09 RX ORDER — AMOXICILLIN AND CLAVULANATE POTASSIUM 400; 57 MG/5ML; MG/5ML
500 POWDER, FOR SUSPENSION ORAL EVERY EVENING
Status: DISCONTINUED | OUTPATIENT
Start: 2023-01-09 | End: 2023-01-09

## 2023-01-09 RX ORDER — AMOXICILLIN AND CLAVULANATE POTASSIUM 200; 28.5 MG/5ML; MG/5ML
500 POWDER, FOR SUSPENSION ORAL EVERY EVENING
Status: COMPLETED | OUTPATIENT
Start: 2023-01-09 | End: 2023-01-10

## 2023-01-09 RX ADMIN — Medication 40 MG: at 20:55

## 2023-01-09 RX ADMIN — Medication 40 MG: at 08:20

## 2023-01-09 RX ADMIN — ACETYLCYSTEINE 2 ML: 200 SOLUTION ORAL; RESPIRATORY (INHALATION) at 16:35

## 2023-01-09 RX ADMIN — WHITE PETROLATUM: 1.75 OINTMENT TOPICAL at 08:19

## 2023-01-09 RX ADMIN — AMOXICILLIN AND CLAVULANATE POTASSIUM 500 MG: 400; 57 POWDER, FOR SUSPENSION ORAL at 10:08

## 2023-01-09 RX ADMIN — TACROLIMUS 1 MG: 5 CAPSULE ORAL at 08:17

## 2023-01-09 RX ADMIN — FLUCONAZOLE 200 MG: 200 TABLET ORAL at 20:56

## 2023-01-09 RX ADMIN — ACETAMINOPHEN 975 MG: 325 TABLET, FILM COATED ORAL at 14:27

## 2023-01-09 RX ADMIN — LACOSAMIDE 100 MG: 10 SOLUTION ORAL at 20:56

## 2023-01-09 RX ADMIN — SODIUM CHLORIDE 500 ML: 9 INJECTION, SOLUTION INTRAVENOUS at 17:59

## 2023-01-09 RX ADMIN — Medication 5 ML: at 08:19

## 2023-01-09 RX ADMIN — Medication 1 DROP: at 14:27

## 2023-01-09 RX ADMIN — ALBUTEROL SULFATE 2.5 MG: 2.5 SOLUTION RESPIRATORY (INHALATION) at 07:25

## 2023-01-09 RX ADMIN — MIDODRINE HYDROCHLORIDE 5 MG: 5 TABLET ORAL at 17:00

## 2023-01-09 RX ADMIN — ACETYLCYSTEINE 2 ML: 200 SOLUTION ORAL; RESPIRATORY (INHALATION) at 19:49

## 2023-01-09 RX ADMIN — LACOSAMIDE 100 MG: 10 SOLUTION ORAL at 08:17

## 2023-01-09 RX ADMIN — ALBUTEROL SULFATE 2.5 MG: 2.5 SOLUTION RESPIRATORY (INHALATION) at 16:35

## 2023-01-09 RX ADMIN — ALBUTEROL SULFATE 2.5 MG: 2.5 SOLUTION RESPIRATORY (INHALATION) at 19:48

## 2023-01-09 RX ADMIN — MIDODRINE HYDROCHLORIDE 5 MG: 5 TABLET ORAL at 08:18

## 2023-01-09 RX ADMIN — FOLIC ACID 1 MG: 1 TABLET ORAL at 08:18

## 2023-01-09 RX ADMIN — ALBUTEROL SULFATE 2.5 MG: 2.5 SOLUTION RESPIRATORY (INHALATION) at 12:02

## 2023-01-09 RX ADMIN — Medication 6 MG: at 20:56

## 2023-01-09 RX ADMIN — Medication 1 DROP: at 08:17

## 2023-01-09 RX ADMIN — QUETIAPINE 75 MG: 25 TABLET ORAL at 20:56

## 2023-01-09 RX ADMIN — ACETAMINOPHEN 975 MG: 325 TABLET, FILM COATED ORAL at 06:30

## 2023-01-09 RX ADMIN — Medication 1 DROP: at 20:56

## 2023-01-09 RX ADMIN — CHLORHEXIDINE GLUCONATE 0.12% ORAL RINSE 15 ML: 1.2 LIQUID ORAL at 20:55

## 2023-01-09 RX ADMIN — LACTULOSE 20 G: 20 SOLUTION ORAL at 20:55

## 2023-01-09 RX ADMIN — RIFAXIMIN 550 MG: 550 TABLET ORAL at 08:21

## 2023-01-09 RX ADMIN — INSULIN GLARGINE 20 UNITS: 100 INJECTION, SOLUTION SUBCUTANEOUS at 08:18

## 2023-01-09 RX ADMIN — HEPARIN SODIUM 1900 UNITS: 1000 INJECTION INTRAVENOUS; SUBCUTANEOUS at 19:01

## 2023-01-09 RX ADMIN — MIDODRINE HYDROCHLORIDE 5 MG: 5 TABLET ORAL at 13:07

## 2023-01-09 RX ADMIN — EPOETIN ALFA-EPBX 40000 UNITS: 40000 INJECTION, SOLUTION INTRAVENOUS; SUBCUTANEOUS at 18:35

## 2023-01-09 RX ADMIN — LEVETIRACETAM 1000 MG: 100 SOLUTION ORAL at 20:55

## 2023-01-09 RX ADMIN — NYSTATIN 500000 UNITS: 100000 SUSPENSION ORAL at 20:56

## 2023-01-09 RX ADMIN — LACTULOSE 20 G: 20 SOLUTION ORAL at 08:17

## 2023-01-09 RX ADMIN — ACETAMINOPHEN 975 MG: 325 TABLET, FILM COATED ORAL at 21:03

## 2023-01-09 RX ADMIN — ACETYLCYSTEINE 2 ML: 200 SOLUTION ORAL; RESPIRATORY (INHALATION) at 07:24

## 2023-01-09 RX ADMIN — AMOXICILLIN AND CLAVULANATE POTASSIUM 500 MG: 200; 28.5 POWDER, FOR SUSPENSION ORAL at 20:55

## 2023-01-09 RX ADMIN — NYSTATIN 500000 UNITS: 100000 SUSPENSION ORAL at 17:00

## 2023-01-09 RX ADMIN — TACROLIMUS 1 MG: 5 CAPSULE ORAL at 18:22

## 2023-01-09 RX ADMIN — ACETYLCYSTEINE 2 ML: 200 SOLUTION ORAL; RESPIRATORY (INHALATION) at 12:02

## 2023-01-09 ASSESSMENT — ACTIVITIES OF DAILY LIVING (ADL)
ADLS_ACUITY_SCORE: 66
ADLS_ACUITY_SCORE: 62
ADLS_ACUITY_SCORE: 60
ADLS_ACUITY_SCORE: 66
ADLS_ACUITY_SCORE: 58
ADLS_ACUITY_SCORE: 60
ADLS_ACUITY_SCORE: 62
ADLS_ACUITY_SCORE: 56

## 2023-01-09 NOTE — PLAN OF CARE
Problem: Mechanical Ventilation Invasive  Goal: Effective Communication  Outcome: Adequate for Care Transition   Goal Outcome Evaluation:       RT PROGRESS NOTE     DATA:     CURRENT SETTINGS: Vent Mode: CMV/AC  (Continuous Mandatory Ventilation/ Assist Control)  FiO2 (%): 25 %  Resp Rate (Set): 12 breaths/min  Tidal Volume (Set, mL): 450 mL  PEEP (cm H2O): 5 cmH2O  Resp: 29                 TRACH TYPE / SIZE:  #6 Shiley XLT Pro. Placed in 11/29             MODE:   AC              FIO2:   25%     ACTION:             THERAPIES:   Albuterol/Mucomyst X 1             SUCTION:                           FREQUENCY:   X 2                        AMOUNT:   small to moderate                        CONSISTENCY:   thick                        COLOR:   white             SPONTANEOUS COUGH EFFORT/STRENGTH OF EFFORT (not elicited by suctioning): fair                              WEANING PHASE: 2                        WEAN MODE:   TM                        WEAN TIME:   17 hours and 53 minutes (upto 2 am). Pt. Tolerated well.                         END WEAN REASON:  vent at night.     RESPONSE:             BS:   rhonchi             VITAL SIGNS:   Blood pressure 107/61, pulse 117, temperature 98.9  F (37.2  C), temperature source Oral, resp. rate 29, weight 92.4 kg (203 lb 12.8 oz), SpO2 97 %.               EMOTIONAL NEEDS / CONCERNS:                  RISK FOR SELF DECANNULATION:                          RISK DUE TO:                          INTERVENTION/S IN PLACE IS/ARE:         NOTE / PLAN:     01/07/23 0839  Ventilator weaning phase 2  ONE TIME     Complete     Comments: TM/cuff up day, AC night.  Wean TM slowly into the night.  PMV trial 30-60min max when up in chair if bloody secretions resolved.   Order Class: Hospital Performed

## 2023-01-09 NOTE — PROGRESS NOTES
Renal progress note  CC: LORETTA likely progressed to ESRD  Assessment and Plan:  57 YOM EtOH cirrhosis , [facility] liver tx 2016, on IHD for LORETTA likely progressed to ESRD since 11/29/2022, recent prolonged hosp for PEA arrest 11/15/2022 to 12/15/2022 , cb influenza and bact PNA, sp Trach and PEG and had a second PEA arrest 12/20 with seizures , cb pleural effusions (fungal infection), and recurrent ascites , CSF + for HHV 6, now at [facility] , nephrology following for ESRD management.    Anuric LORETTA likely ESRD now  requiring dialysis: After PEA arrest, was on CRRT which was discontinued 11/26/2022 and now started on intermittent hemodialysis ongoing since 11/29/2022.  He is maintained on a MWF schedule.  No signs of renal recovery yet.    -Attempted diuretic challenge and bladder scans->remains anuric and torsemide was discontinued 1/2  -Aim to keep net negative.  Target UF 2-3kg as BP allows  -Midodrine to support blood pressure and UF.   -MWF HD at [facility]     Anemia of chronic renal failure:   Hematochezia   Left hemothorax.    Erythropoietin -> Increase to 40k weekly today   Previously low iron, holding off replacement given infectious concerns as below   Low Hgb over the weekend  Transfusing per hospitalist service      Chronic hypotension: Likely due to chronic liver disease, improving.  on midodrine to 5mg TID with parameters to hold for SBP>110.  may need to increase if hemodynamics do not support UF  Will hold off albumin for now     Hypokalemia: Needing intermittent replacement.    He is higher K bath with HD.     Acute on chronic hypoxic respiratory failure: S/p tracheostomy 11/29/2022.  Complicated by left hemopneumothorax s/p left chest tube.  History of aspergillus PNA and multiple bacterial PNA. On micafungin.  Mgmt per pulm      CARRILLO cirrhosis s/p liver transplant: Immunosuppression per GI.  Tacrolimus and prednisone.  Paracentesis as indicated per GI. On Abx for lactobacillus peritonitis.      HHV-6  meningeal encephalitis:  Seizures:  LP showing HHV6 12/21/22.  Initial seizure after PEA arrest, then another seizure again 12/21/2022 during dialysis session.  Initially on acyclovir and then switched over to ganciclovir, since been discontinued.  On Keppra and Vimpat.  Management per neurology and ID    Thank you for the consultation we will follow  Jyotsna Gamble PA-C   [facility]  [phone]      Subjective  Seen at bedside, up in chair, would like to get back in bed. Has been doing some dialysis in chair and some in bed, seems to tolerate both ok. Reports some lightheadedness with HD but also more SOB. Will need to UF as BP tolerates.     Objective    Vital signs in last 24 hours  Temp:  [98.3  F (36.8  C)-99.2  F (37.3  C)] 98.3  F (36.8  C)  Pulse:  [] 99  Resp:  [21-29] 24  BP: (106-120)/(61-71) 106/68  FiO2 (%):  [25 %-30 %] 30 %  SpO2:  [94 %-100 %] 100 %  Weight:   [unfilled]    Intake/Output last 3 shifts  I/O last 3 completed shifts:  In: 690 [NG/GT:90]  Out: 400 [Stool:400]  Intake/Output this shift:  I/O this shift:  In: 595 [NG/GT:595]  Out: 200 [Stool:200]    Physical Exam  Alert awake, NAD  CV: RRR without murmur or rub  Lung: clear and equal; no extra sounds  Ab: soft and NT; Mild distended; normal bs  Ext: no edema and well perfused  Skin; no rash    Pertinent Labs   Lab Results   Component Value Date    WBC 7.5 12/27/2022    HGB 7.3 (L) 01/09/2023    HCT 29.4 (L) 12/27/2022     (H) 12/27/2022    PLT 58 (L) 01/04/2023     Lab Results   Component Value Date    BUN 76.0 (H) 01/09/2023     (L) 01/09/2023    CO2 27 01/09/2023       Lab Results   Component Value Date    ALBUMIN 2.2 (L) 01/02/2023     Lab Results   Component Value Date    PHOS 3.8 01/09/2023     I reviewed all lab results  Jyotsna Gamble PA-C

## 2023-01-09 NOTE — PROGRESS NOTES
[facility]    Medicine Progress Note - Hospitalist Service    Date of Admission:  12/29/2022  Brief history:  Blanco Osborne is an 57 year old male who is transferred from [facility] f to [facility] for  IV antibiotic treatment.  Patient has multiple medical problems including history of liver transplant 2016, PAF on chronic anticoagulation, history of DM2, CVA, HTN, CHRISTIAN on BiPAP, and history of alcohol abuse in the past causing liver damage ending with liver transplant.  About 6 weeks ago he had respiratory arrest and was diagnosed with infection with parainfluenza and strep pneumoniae and acute on chronic renal insufficiency ending with CRF needing 3/week hemodialysis.  During this period he was diagnosed with cirrhosis of transplanted liver and hyperammonia.  Currently he is on Lactulose and Rifaximin.        On 12/14/22, due to CIP, he was transferred to [facility] for the first time with Trach, PEG and Rt chest tube.    On 12/16/2022 patient was transferred back to [facility] due to respiratory insufficiency secondary to hydropneumothorax and drainage of 900 cc bloody pleural effusion, bloody stool and transfusion of 2 units PRBC.      On 12/20/2022 he had another episode of PEA arrest followed by CPR x2 minutes and possibly had seizure activity in [facility]. 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  He has been on ventilator and has improved to trach dome tolerating up to 6 hours so far. He had chest tube which was removed.    He had SBP with growth of lactobacillus and is currently on Unasyn which will be switched to Augmentin orally through 1/12/2023.    Pleural effusion grew Candida and is on micafungin.  He is also on  due to cirrhosis and hyperammonia.  He is anemic and is on Retacrit.   He is on lacosamide and Keppra due to history of seizure.  There is question of him drinking again  causing him cirrhosis of the transplanted liver.    Assessment & Plan   Principal Problem:    Acute on chronic respiratory failure with hypoxia (H)  Active Problems:    Acquired immunocompromised state (H)    Cirrhosis of liver (H)    NAFLD (nonalcoholic fatty liver disease)    Liver transplanted (H)    Immunosuppression (H)    Acute kidney injury (H)    Spontaneous bacterial peritonitis (H)    Critical illness myopathy    History of sudden cardiac arrest, PEA    Dependent on hemodialysis (H)    Acute now chronic respiratory failure requiring tracheostomy..  Initial event was parainfluenza and strep pneumo pneumonia.  Had failed extubation x2 and tracheostomy performed last hospitalization.  Had additional complications of bilateral pneumothoraces.  (Most recently was a hydropneumothorax where fluid grew Candida.. Chest tube was placed and removed . Had decent tolerance for pressure support and trach dome but after few hours and trach dome 12/27 had a pretty significant episode of desaturation.   Plan:  -Wean ventilator per pulmonology.  -Good pulmonary toilet  -Tracheostomy care per RT    History PEA  Hypotension  PEA arrest prior to his previous admission and 1 episode of PEA associated with desaturation on 12/20.  No structural cardiac disease but does have A. Fib which neurologist thought might have been contributory to his embolic strokes.  Anticoagulation been restarted with stable labs although high risk for bleeding seconday to thrombocytopenia.  Also has developed baseline hypotension and is on midodrine 10 mg 3 times daily.  Plan:  Continue Eliquis.    Continue current midodrine dose     Seizure after hypoxic event.  This is in the context of baseline multifactorial encephalopathy secondary to his ongoing critical illness and prior cardiac arrests and prior strokes and cirrhosis with hepatic encephalopathy. MRI of head of 12/20/22 reveals no PRES or leptomeningeal enhancement but scattered.  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   Plan:  - Continue with  Keppra  indefinitely.  Neurology follow-up.   - Anticoagulation indefinitely for secondary stroke prevention.  On hold since 1/6/2023 due to tracheal bleeding during suction.  Patient is currently in sinus rhythm.  And telemetry and monitored.  - Tylenol TID for headache.        ESRD: Now on iHD.  No return of renal function.  - MWF schedule      Infectious disease  1. 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 4.  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  Have had some conversations within our group and with transplant ID and general infectious disease.  General consensus is that ganciclovir probably could be discontinued  2. H/o Strep/Parainfluenza infection last hospitalization. Completed treatment course  3. 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  4.  Lactobacillus growing in the broth 4 days after paracentesis.  Cell count was very low.  5.  Candida in left pleural fluid cultures.  Sensitivities not resulted.  6.  Immunosuppressed on tacrolimus.  Discussed with transplant service at the [facility] who felt that given the absence of rejection on his most recent biopsy and ongoing issues with infection continuing with 1 twice daily tacrolimus is the best current option.  They have seen his subtherapeutic trough levels.  Plan:  1.  Confirm with transplant ID whether or not we can stop ganciclovir.  2.  Unasyn for 2 weeks total.  Change to Augmentin through 1/12/2023.  3.  At least 4 weeks of antimicrobial for the Candida.  If sensitive to fluconazole could de-escalate from micafungin.  4.  Continue tacrolimus as above and we are tapering steroids    Post transplant cirrhosis.  Main manifestations of this are hepatic encephalopathy and ascites.  Hepatologist at [facility] felt that most likely reason for the cirrhosis was metabolic syndrome or alcohol intake.  There was no evidence of rejection on biopsy and there was some  evidence of regeneration. Paracentesis done on 12/22/2022 with lack of bacillus in the broth indicating SBP low cell count was very low  Plan:  1.  Intermittent paracentesis as needed.  2.  Getting 1 mg  tacrolimus BID and tapering steroids   3. Lactulose and Rifaximin as above  4.  Treat SBP for 2 weeks.  5.  He has an appointment in April with Dr. Simms if he is out of the hospital.    Diabetes mellitus type 2  1. DM.  Titrating insulin. He needs supplemental coverage.  2. He is on steroids which we are weaning  Plan:  -Continue with current regimen.   -Hypoglycemic protocol in place    Moderate malnutrition, protein and calorie type.  -Continue with tube feeds via PEG tube  -Nutritionist consulted and following      Pancytopenia due cirrhosis, ESRD and hypersplenism. WBC count has improved.  However he needs frequent PRBC transfusion due Hb <7.0.   Patient is on anticoagulation for PAF.  Currently in sinus rhythm.  Is off anticoagulation due to tracheal bleeding.  Plan:  1.  Eliquis is on hold for now.     Code Status: Full     Diet: Adult Formula Drip Feeding: Continuous Novasource Renal; Gastrostomy; Goal Rate: 60; mL/hr    DVT Prophylaxis: DOAC  Nixon Catheter: Not present  Central Lines: PRESENT      Cardiac Monitoring: ACTIVE order. Indication: Tachyarrhythmias, acute (48 hours)  Code Status: Full Code      Disposition Plan     Expected Discharge Date: 01/12/2023    Discharge Delays: Complex Discharge    Discharge Comments: Vent. Trach. Phase 1. PEG.  Rectal Tube.      The patient's care was discussed with the Bedside Nurse, Patient and Patient's Family.    Yahir Barcenas MD  Hospitalist Service  [facility]  Securely message with the Vocera Web Console (learn more here)  Text page via Other Machine Paging/Directory     Clinically Significant Risk Factors              # Hypoalbuminemia: Lowest albumin = 1.8 g/dL at 12/29/2022  4:40 AM, will monitor as appropriate             # Severe Malnutrition: based on nutrition  assessment      _____________________________________________________________________    Interval History   No new events overnight per RN. RT in the room and availed in-speaking valve for this encounter.  Patient reports he is just about the same compared to yesterday.  Reports no new complaints    Data reviewed today: I reviewed all medications, new labs and imaging results over the last 24 hours. I personally reviewed no images or EKG's today.    Physical Exam   Vital Signs: Temp: 98.3  F (36.8  C) Temp src: Axillary BP: 120/79 Pulse: 101   Resp: 24 SpO2: 100 % O2 Device: Trach dome Oxygen Delivery: 30 LPM  Weight: 209 lbs 0 oz   General: Patient is lying in bed comfortably, he is awake alert oriented to person  HEENT: Head appears normocephalic atraumatic, eyes pupils appear equal round and reactive to light  Neck: Viable trach noted  Lungs: He has a normal respiratory effort, good air entry is noted no obvious wheezing noted  Heart: He has a good S1 and S2 no obvious murmurs, peripheral pulses are palpable bilaterally  Abdomen: Soft, distended, ascites noted, bowel sounds noted  Musculoskeletal: Good muscle tone, PICC line in right brachial artery, nails and digits appear acyanotic I did not examine his range of motion  Skin: No obvious rashes on inspection, warm to touch, please refer to nursing/wound care note for complete skin exam    Data   Recent Labs   Lab 01/09/23  0656 01/09/23  0607 01/08/23  0630 01/08/23  0604 01/06/23  0631 01/06/23  0543 01/05/23  2352 01/04/23  0629 01/04/23  0623   HGB 7.3*  --   --  7.1*  --  6.8* 7.3*   < > 6.3*   PLT  --   --   --   --   --   --   --   --  58*   *  --   --   --   --   --  136  --   --    POTASSIUM 3.7  --   --   --   --   --  3.5  --   --    CHLORIDE 94*  --   --   --   --   --   --   --   --    CO2 27  --   --   --   --   --   --   --   --    BUN 76.0*  --   --   --   --   --   --   --   --    CR 3.14*  --   --   --   --   --   --   --   --    ANIONGAP  13  --   --   --   --   --   --   --   --    KELLY 9.2  --   --   --   --   --   --   --   --    * 169* 125*  --    < >  --  121   < >  --     < > = values in this interval not displayed.     Medications     dextrose       - MEDICATION INSTRUCTIONS -         sodium chloride 0.9%  300 mL Hemodialysis Machine During Dialysis/CRRT (from stock)     acetaminophen  975 mg Per Feeding Tube Q8H BIJU    Or     acetaminophen  650 mg Rectal Q8H BIJU     acetylcysteine  2 mL Nebulization 4x daily     albuterol  2.5 mg Nebulization 4x daily     amoxicillin-clavulanate  500 mg Per Feeding Tube QPM     amoxicillin-clavulanate  500 mg Per Feeding Tube Once per day on Mon Wed Fri     [Held by provider] apixaban ANTICOAGULANT  5 mg Oral or Feeding Tube BID     carboxymethylcellulose PF  1 drop Both Eyes TID     chlorhexidine  15 mL Mouth/Throat BID     epoetin mirta-epbx  40,000 Units Subcutaneous Weekly     fluconazole  200 mg Per Feeding Tube At Bedtime     folic acid  1 mg Oral or Feeding Tube Daily     heparin Lock (1000 units/mL High concentration)  1,900 Units Intracatheter During Dialysis/CRRT (from stock)     heparin Lock (1000 units/mL High concentration)  1,900 Units Intracatheter During Dialysis/CRRT (from stock)     insulin aspart  1-6 Units Subcutaneous Daily     insulin glargine  20 Units Subcutaneous Daily     lacosamide  100 mg Oral or Feeding Tube BID     lactulose  20 g Oral or Feeding Tube BID     levETIRAcetam  1,000 mg Oral or Feeding Tube Q24H     Melatonin  6 mg Oral or NG Tube At Bedtime     midodrine  10 mg Oral During Dialysis/CRRT (from stock)     midodrine  5 mg Oral or Feeding Tube TID w/meals     mineral oil-hydrophilic petrolatum   Topical Daily     [START ON 1/10/2023] multivitamin RENAL  1 tablet Per Feeding Tube Daily     nystatin  500,000 Units Swish & Swallow 4x Daily     pantoprazole  40 mg Per Feeding Tube BID     QUEtiapine  75 mg Oral or Feeding Tube At Bedtime     rifaximin  550 mg Per  Feeding Tube BID     sodium chloride (PF)  10-30 mL Intracatheter Q8H     tacrolimus  1 mg Oral or Feeding Tube BID   In speaking valve

## 2023-01-09 NOTE — PLAN OF CARE
Problem: Mechanical Ventilation Invasive  Goal: Optimal Device Function  Outcome: Progressing  Goal: Mechanical Ventilation Liberation  Outcome: Progressing  Goal: Absence of Device-Related Skin and Tissue Injury  Outcome: Progressing  Goal: Absence of Ventilator-Induced Lung Injury  Outcome: Progressing   RT PROGRESS NOTE     DATA:     CURRENT SETTINGS: AC 12, 450, +5, 25% @Night             TRACH TYPE / SIZE: #7BIVONA TTS Changed on 01/09/2023             MODE: AC             FIO2:  25%     ACTION:             THERAPIES: Albuterol /Mucomyst Neb QID              SUCTION:                           FREQUENCY: X7                        AMOUNT: Small to copious                         CONSISTENCY: thick                        COLOR: white/pink tinged              SPONTANEOUS COUGH EFFORT/STRENGTH OF EFFORT (not elicited by suctioning):Strong                              WEANING PHASE:2                        WEAN MODE: 30%@30 L/MIN /TM days as ashley (into night by 2hrs)                        WEAN TIME: Since 08:40                         END WEAN REASON:Ongoing      RESPONSE:             BS: Coarse/Rhonci             VITAL SIGNS:Sat 100%,-107 ,RR 20-22             EMOTIONAL NEEDS / CONCERNS: no              RISK FOR SELF DECANNULATION:                          RISK DUE TO:                          INTERVENTION/S IN PLACE IS/ARE:     NOTE / PLAN: Cont . Current plan of care/ PMV - when upright in bed or in chair, limit to 1-2hr max BID-Patient wore PMV for 2 hrs during day shift and wearing Since 17:20 , Patient is weaning on 30% @30 L/MIN /TM Since 08:40 ,tolerating- Patient to wean on TM till 4 AM tonight

## 2023-01-09 NOTE — PLAN OF CARE
Problem: Mechanical Ventilation Invasive  Goal: Optimal Device Function  Intervention: Optimize Device Care and Function  Recent Flowsheet Documentation  Taken 1/8/2023 1800 by Tonie Dykes, RN  Oral Care: swabbed with antiseptic solution  Taken 1/8/2023 1300 by Tonie Dykes, RN  Oral Care: swabbed with antiseptic solution     Problem: Pain Acute  Goal: Optimal Pain Control and Function  Outcome: Progressing   Goal Outcome Evaluation: Patient denied pain this shift, was turned and repositioned.

## 2023-01-09 NOTE — PLAN OF CARE
Problem: Pain Acute  Goal: Optimal Pain Control and Function  Outcome: Progressing     Problem: Enteral Nutrition  Goal: Absence of Aspiration Signs and Symptoms  Outcome: Progressing  Goal: Feeding Tolerance  Outcome: Progressing     Problem: Chronic Kidney Disease  Goal: Electrolyte Balance  Outcome: Progressing     Problem: Artificial Airway  Goal: Effective Communication  Outcome: Progressing     Problem: Anemia  Goal: Anemia Symptom Improvement  Outcome: Progressing  Intervention: Monitor and Manage Anemia  Recent Flowsheet Documentation  Taken 1/9/2023 1232 by Inna Guzman RN  Safety Promotion/Fall Prevention:    activity supervised    bed alarm on    fall prevention program maintained   Goal Outcome Evaluation:      Patient had 2/10 signs of pain at the start of shift, patient denied pain later in the shift. Patient seemed to be tolerating tube feeding okay so far this shift, trach was changed to Bivona # 7 this shift-had speaking valve on for 2 hours too.Patient's EPO is now weekly, for ABG in am, phosphorus today is 3.8-recheck in am per protocol, refused chlorhexidine x 1 and nystatin swish and spit x 2 in am, wound on the right elbow is healed-Mepilex  taken off (WOC RN informed), rectal tube pouch was changed (100 ml was documented), patient was up in chair x 1 in am-frequently requested to be up in chair (in bed now for dialysis-will get up after this)

## 2023-01-09 NOTE — PLAN OF CARE
Problem: Mechanical Ventilation Invasive  Goal: Effective Communication  Intervention: Ensure Effective Communication  Recent Flowsheet Documentation  Taken 1/9/2023 0100 by Janey Melendez RN  Trust Relationship/Rapport: care explained  Goal: Optimal Device Function  Intervention: Optimize Device Care and Function  Recent Flowsheet Documentation  Taken 1/9/2023 0100 by Janey Melendez RN  Airway Safety Measures: all equipment/monitors on and audible  Taken 1/9/2023 0053 by Janey Melendez RN  Oral Care: swabbed with sterile water  Goal: Mechanical Ventilation Liberation  Intervention: Promote Extubation and Mechanical Ventilation Liberation  Recent Flowsheet Documentation  Taken 1/9/2023 0100 by Janey Melendez RN  Medication Review/Management: medications reviewed  Goal: Absence of Ventilator-Induced Lung Injury  Intervention: Prevent Ventilator-Associated Pneumonia  Recent Flowsheet Documentation  Taken 1/9/2023 0100 by Janey Melendez RN  Head of Bed (HOB) Positioning: HOB at 20-30 degrees  Taken 1/9/2023 0053 by Janey Melendez RN  Oral Care: swabbed with sterile water     Problem: Enteral Nutrition  Goal: Absence of Aspiration Signs and Symptoms  Outcome: Progressing  Intervention: Minimize Aspiration Risk  Recent Flowsheet Documentation  Taken 1/9/2023 0100 by Janey Melendez RN  Head of Bed (HOB) Positioning: HOB at 20-30 degrees  Taken 1/9/2023 0053 by Janey Melendez RN  Oral Care: swabbed with sterile water  Goal: Safe, Effective Therapy Delivery  Outcome: Progressing  Goal: Feeding Tolerance  Outcome: Progressing   Goal Outcome Evaluation    Patient alert and oriented and can make needs known. Denies pain when asked. On vent overnight, trach dome on this evening. Tube feeds infusing without incident. Rectal tube had moderate amount of bypass tonight. Patient anuric, dialysis MWF. Sinus tachycardia noted on monitor with rates from 100-115. Patient appears comfortable in  bed, turning and repositioning every one to two hours. States he is looking forward to wife visiting later today. No other needs identified. Continue to monitor.     Janey Melendez RN

## 2023-01-09 NOTE — PROGRESS NOTES
Pulmonary Progress Note    Admit Date: 12/29/2022  CODE: Full Code    Assessment/Plan:   57yoM with liver transplant(2016), recent prolonged hospitalization 11/12-12/15 for PEA cardiac arrest, Strep/Parainfluenza pneumonia with ARDS, MODS, ?aspergillus pna s/p 1 week of voriconazole, right PTX requiring chest tube since removed.  Now HD dependent, s/p trach/PEG.  Course further c/b new left PTX requiring chest tube(since removed), b/l pleural effusions, ascites, mucus plugging, PEA arrest and seizure on 12/20, pleural fluid cxs growing candida parapsilosis & ascites cx growing lactobacillus on antimicrobials, and CSF panel positive for HHV6 ultimately decided to not treat.  Admitted to LTAC 12/29.     Discussion of pertinent problems:  Acute hypoxic/hypercapnic respiratory failure s/p trach: Progressing slowly with trach dome trials and AC at night as he is severely deconditioned.      Tracheostomy in place: 8 Shiley placed 11/29.  Changed to 6 Shiley prox xlt 12/8, unclear why     Dysphagia s/p PEG tube.  BDT 12/30 with no immediate but moderate delayed aspiration 45min later.  Tolerating short PMV trials    Candida in left pleural fluid cultures: started on Micafungin switched to fluconazole 1/3 based on cx sensitivities.    - Cont fluconazole for ~4 week total course based on imaging     LORETTA on MWF HD: followed by Nephrology     S/p Liver transplant with post transplant cirrhosis & ascites s/p paracentesis 12/22: on [place]ro and prednisone    CHRISTIAN on home BiPAP.  Managed currently with trach and vent     lactobacillus peritonitis started on Unasyn transitioned to PO Augmentin.    - Augmentin duration per ID & primary     Seizure after hypoxic event on 12/20 with subsequent seizure 12/21 during dialysis:   - Keppra and vimpat indefinitely     Multifocal acute ischemic strokes on MRI(12/20).    - Eliquis now on hold d/t acute anemia with bloody secretions    Hx b/l pneumothoraces: right CT first placed 11/16, removed,  then replaced by IR on 12/12, removed again 12/19.  Left chest tube placed 12/16, ?hemithorax, CT removed on 12/19    Hx mucus plugging of bronchi.  Bronch on 12/20 with minimal secretions.  Mucus plug suctioned 1/4 and restarted on metanebs, stopped d/t bloody secretions     Acute on chronic Anemia: s/p 1 unit PRBCs 1/4 for Hgb 6.3.  Intermittent bloody tracheal secretions, Apixaban held 1/4.  Hgb 6.8 on 1/6 s/p 1 unit PRBCs, and had large amount of bloody secretions.  CTA neck & C/A/P unremarkable for acute bleed.  Bloody secretions now resolved.     Plan:   - Phase 2 weans: TM as tolerated.  Check VBG in the morning  - PMV trials: 1-2hr max BID when upright or in chair.  Otherwise cuff up and will plan to repeat BDT likely tomorrow  - bloody tracheal secretions resolved - downsize trach to 7 Bivona and will attempt longer speaking valve trials   - Bronchial hygiene with Albuterol + mucomyst nebs QID.     - Abdomen distended, worsening ascites on imaging.  Likely needs repeat paracentesis in the near future.  Right pleural effusion is moderate in size and when/if his breathing or O2 demands worsen, will plan to coordinate to have both done.  Watch for now, discussed with hospitalist    - Ok to restart apixaban from pulmonary perspective - defer to primary     Henry Fenton CNP  Pulmonary Medicine  [facility]  Pager [phone]    Subjective/Interim Events:   - denies sob, n/v, fever, chills.  Positive for abdominal discomfort        Tracheal secretions:  Overnight - x2, sm/mod, thick, white  Yesterday - x5, sm/mod, thick, pale yellow    Cough strength: weak/moderate    Current phase of ventilator weaning pathway:  Phase 2    Ventilator weaning results   1/8: TM for 17hr 53min (tolerated well     Clinical status discussed today with respiratory therapist, patient    Medications:       dextrose       - MEDICATION INSTRUCTIONS -         sodium chloride 0.9%  300 mL Hemodialysis Machine During Dialysis/CRRT  (from stock)     acetaminophen  975 mg Per Feeding Tube Q8H BIJU    Or     acetaminophen  650 mg Rectal Q8H BIJU     acetylcysteine  2 mL Nebulization 4x daily     albuterol  2.5 mg Nebulization 4x daily     amoxicillin-clavulanate  500 mg Per Feeding Tube QPM     amoxicillin-clavulanate  500 mg Per Feeding Tube Once per day on Mon Wed Fri     [Held by provider] apixaban ANTICOAGULANT  5 mg Oral or Feeding Tube BID     B and C vitamin Complex with folic acid  5 mL Per Feeding Tube Daily     carboxymethylcellulose PF  1 drop Both Eyes TID     chlorhexidine  15 mL Mouth/Throat BID     epoetin mirta-epbx  10,000 Units Subcutaneous Once per day on Mon Wed Fri     fluconazole  200 mg Per Feeding Tube At Bedtime     folic acid  1 mg Oral or Feeding Tube Daily     heparin Lock (1000 units/mL High concentration)  1,900 Units Intracatheter During Dialysis/CRRT (from stock)     heparin Lock (1000 units/mL High concentration)  1,900 Units Intracatheter During Dialysis/CRRT (from stock)     insulin aspart  1-6 Units Subcutaneous Daily     insulin glargine  20 Units Subcutaneous Daily     lacosamide  100 mg Oral or Feeding Tube BID     lactulose  20 g Oral or Feeding Tube BID     levETIRAcetam  1,000 mg Oral or Feeding Tube Q24H     Melatonin  6 mg Oral or NG Tube At Bedtime     midodrine  10 mg Oral During Dialysis/CRRT (from stock)     midodrine  5 mg Oral or Feeding Tube TID w/meals     mineral oil-hydrophilic petrolatum   Topical Daily     nystatin  500,000 Units Swish & Swallow 4x Daily     pantoprazole  40 mg Per Feeding Tube BID     QUEtiapine  75 mg Oral or Feeding Tube At Bedtime     rifaximin  550 mg Per Feeding Tube BID     sodium chloride (PF)  10-30 mL Intracatheter Q8H     tacrolimus  1 mg Oral or Feeding Tube BID         Exam/Data:   Vitals  /63 (BP Location: Left arm)   Pulse 105   Temp 99.2  F (37.3  C) (Oral)   Resp 26   Wt 94.8 kg (209 lb)   SpO2 100%   BMI 28.35 kg/m       I/O last 3 completed  shifts:  In: 690 [NG/GT:90]  Out: 400 [Stool:400]  Weight change: 2.359 kg (5 lb 3.2 oz)    Vent Mode: Trach collar  FiO2 (%): 30 %  Resp Rate (Set): 12 breaths/min  Tidal Volume (Set, mL): 450 mL  PEEP (cm H2O): 5 cmH2O  Resp: 26      EXAM:  Gen: no distress sitting in chair  HEENT: NT, trach midline/intact, significant peristomal ecchymosis, PMV on, mildly wet voice   CV: RRR  Resp: CTAB; non-labored   Abd: large, distended, soft, mildly tender, BS hypoactive  Skin: no visible rashes or lesions, scattered ecchymosis, fragile   Ext: mild pedal edema, weak  Neuro: alert, follows commands, mouths words     ROS:  A 10-system review was obtained and is negative with the exception of the symptoms noted above.    Labs:  Basic Metabolic Panel  Recent Labs   Lab 01/09/23  0656 01/09/23  0607 01/08/23  0630 01/07/23  1938 01/06/23  0631 01/05/23  2352   *  --   --   --   --  136   POTASSIUM 3.7  --   --   --   --  3.5   CHLORIDE 94*  --   --   --   --   --    CO2 27  --   --   --   --   --    BUN 76.0*  --   --   --   --   --    CR 3.14*  --   --   --   --   --    * 169* 125* 135*   < > 121    < > = values in this interval not displayed.     CBC RESULTS: Recent Labs   Lab Test 01/09/23  0656 01/05/23  1442 01/04/23  0623 12/27/22  0420   WBC  --   --   --  7.5   RBC  --   --   --  2.76*   HGB 7.3*   < > 6.3* 8.8*   HCT  --   --   --  29.4*   MCV  --   --   --  107*   MCH  --   --   --  31.9   MCHC  --   --   --  29.9*   RDW  --   --   --  18.7*   PLT  --   --  58* 63*    < > = values in this interval not displayed.      Venous Blood Gas  Recent Labs   Lab 01/05/23  2352   PHV 7.43   PCO2V 49   PO2V 39   HCO3V 31*   MITA 8.0*        RADIOLOGY: Personally reviewed; radiology read below   CTA CHEST ABDOMEN PELVIS W CONTRAST 1/6/2023                                                                   IMPRESSION:  1. No obvious active bleeding identified.  2. Large amount of abdominal/pelvic ascites, splenomegaly, and  scattered varicosities. The above findings would be most typical for sequelae of portal venous hypertension.  3. Significant stranding of the omental fat and intraperitoneal fat, this is nonspecific, but may be reactive or neoplastic in nature.  4. Percutaneous gastrostomy tube, tracheostomy, and the rectal tube appear to be in good position.  5. Central catheter with tip in the inferior right atrium.  6. Mild to moderate right pleural effusion and mild left pleural effusion, both of these appear to be loculated given the peripheral enhancement. Presumed sequelae of prior hematoma seen in the right lung.  7. Minute amount of gas seen associated with the left pleural effusion and this is presumed to be iatrogenic in nature.  8. Slight filling defect within the lower trachea most typical for slight mucous.  9. Enlargement of the main pulmonary artery presumed to represent sequelae of pulmonary arterial hypertension.   ----------------  CTA NECK WITH CONTRAST 1/6/2023     INDICATION: Tracheostomy in place, worsening bloody tracheal secretions, rule out tracheoinnominate fistula.                                                                   IMPRESSION:   1.  No significant stenosis or dissection.  2.  No definite findings to suggest a tracheoinnominate artery fistula.  ----------------------  XR CHEST  1/4/2023                                                                   IMPRESSION:      Right PICC terminates in the middle third of the SVC. Tunneled right jugular approach dialysis catheter terminates in the right atrium. Tracheostomy resides within the tracheal air column.     Persistent shallow lung inflation. Territories of bibasilar atelectasis are similar. Small right pleural effusion visible in the right lateral sulcus and layering into the interlobar fissures is similar. No enlarging pleural effusion. No pneumothorax.     Unchanged heart and mediastinal contours. Unchanged oblique interface just lateral  to the dialysis catheter/SVC and corresponds to rounded atelectasis on prior CT.

## 2023-01-09 NOTE — PHARMACY-IMMUNOSUPPRESSION MONITORING
Tacrolimus level = 3.7 drawn 1/9 @ 0656.   Last dose given 1/8 @ 1749.   This is a 13-hour level.  Current dose: 1mg bid   Tacrolimus  goal: 3-5  per transplant team.  New or change in medications with potentially significant interactions with  Tacrolimus: None  Recommendations from transplant team, coordinatorJerrica: no change, continue weekly level on Mondays  Orders placed  : N/A   Mariah Iglesias RPh,BCCCP, BCCP

## 2023-01-09 NOTE — PLAN OF CARE
Problem: Plan of Care - These are the overarching goals to be used throughout the patient stay.    Goal: Optimal Comfort and Wellbeing  Outcome: Progressing  Intervention: Provide Person-Centered Care  Recent Flowsheet Documentation  Taken 1/9/2023 0400 by Noemi Vidal RN  Trust Relationship/Rapport: care explained     Problem: Pain Acute  Goal: Optimal Pain Control and Function  Outcome: Progressing  Intervention: Prevent or Manage Pain  Recent Flowsheet Documentation  Taken 1/9/2023 0400 by Noemi Vidal RN  Bowel Elimination Promotion: (Rectal tube) --  Medication Review/Management: medications reviewed     Problem: Enteral Nutrition  Goal: Feeding Tolerance  Outcome: Progressing  Intervention: Prevent and Manage Feeding Intolerance  Recent Flowsheet Documentation  Taken 1/9/2023 0400 by Noemi Vidal RN  Nutrition Support Management: weight trending reviewed     Problem: Enteral Nutrition  Goal: Feeding Tolerance  Intervention: Prevent and Manage Feeding Intolerance  Recent Flowsheet Documentation  Taken 1/9/2023 0400 by Noemi Vidal RN  Nutrition Support Management: weight trending reviewed   Goal Outcome Evaluation:         Pt alert and oriented. Denied for any pain or discomfort. Turned and repositioned. Tf is running per MAR. Suctioned for med white thick secretion. Will cont to monitor      Noemi Jerez RN

## 2023-01-10 ENCOUNTER — APPOINTMENT (OUTPATIENT)
Dept: OCCUPATIONAL THERAPY | Facility: CLINIC | Age: 58
DRG: 207 | End: 2023-01-10
Attending: HOSPITALIST
Payer: COMMERCIAL

## 2023-01-10 ENCOUNTER — ANCILLARY PROCEDURE (OUTPATIENT)
Dept: GENERAL RADIOLOGY | Facility: CLINIC | Age: 58
DRG: 207 | End: 2023-01-10
Attending: HOSPITALIST
Payer: COMMERCIAL

## 2023-01-10 ENCOUNTER — APPOINTMENT (OUTPATIENT)
Dept: PHYSICAL THERAPY | Facility: CLINIC | Age: 58
DRG: 207 | End: 2023-01-10
Attending: HOSPITALIST
Payer: COMMERCIAL

## 2023-01-10 ENCOUNTER — APPOINTMENT (OUTPATIENT)
Dept: SPEECH THERAPY | Facility: CLINIC | Age: 58
DRG: 207 | End: 2023-01-10
Attending: HOSPITALIST
Payer: COMMERCIAL

## 2023-01-10 LAB
ALBUMIN SERPL BCG-MCNC: 2 G/DL (ref 3.5–5.2)
ALP SERPL-CCNC: 361 U/L (ref 40–129)
ALT SERPL W P-5'-P-CCNC: 11 U/L (ref 10–50)
ANION GAP SERPL CALCULATED.3IONS-SCNC: 11 MMOL/L (ref 7–15)
AST SERPL W P-5'-P-CCNC: 24 U/L (ref 10–50)
BASOPHILS # BLD AUTO: 0 10E3/UL (ref 0–0.2)
BASOPHILS NFR BLD AUTO: 1 %
BILIRUB SERPL-MCNC: 0.5 MG/DL
BUN SERPL-MCNC: 50.5 MG/DL (ref 6–20)
CALCIUM SERPL-MCNC: 8.9 MG/DL (ref 8.6–10)
CHLORIDE SERPL-SCNC: 96 MMOL/L (ref 98–107)
CREAT SERPL-MCNC: 2.28 MG/DL (ref 0.67–1.17)
DEPRECATED HCO3 PLAS-SCNC: 28 MMOL/L (ref 22–29)
EOSINOPHIL # BLD AUTO: 0.1 10E3/UL (ref 0–0.7)
EOSINOPHIL NFR BLD AUTO: 1 %
ERYTHROCYTE [DISTWIDTH] IN BLOOD BY AUTOMATED COUNT: 19.2 % (ref 10–15)
GFR SERPL CREATININE-BSD FRML MDRD: 32 ML/MIN/1.73M2
GLUCOSE BLDC GLUCOMTR-MCNC: 123 MG/DL (ref 70–99)
GLUCOSE SERPL-MCNC: 131 MG/DL (ref 70–99)
HCT VFR BLD AUTO: 24.2 % (ref 40–53)
HGB BLD-MCNC: 7.2 G/DL (ref 13.3–17.7)
IMM GRANULOCYTES # BLD: 0.1 10E3/UL
IMM GRANULOCYTES NFR BLD: 2 %
LACTATE SERPL-SCNC: 1.5 MMOL/L (ref 0.7–2)
LYMPHOCYTES # BLD AUTO: 1.3 10E3/UL (ref 0.8–5.3)
LYMPHOCYTES NFR BLD AUTO: 27 %
MCH RBC QN AUTO: 30.4 PG (ref 26.5–33)
MCHC RBC AUTO-ENTMCNC: 29.8 G/DL (ref 31.5–36.5)
MCV RBC AUTO: 102 FL (ref 78–100)
MONOCYTES # BLD AUTO: 0.7 10E3/UL (ref 0–1.3)
MONOCYTES NFR BLD AUTO: 15 %
NEUTROPHILS # BLD AUTO: 2.6 10E3/UL (ref 1.6–8.3)
NEUTROPHILS NFR BLD AUTO: 54 %
NRBC # BLD AUTO: 0 10E3/UL
NRBC BLD AUTO-RTO: 0 /100
PHOSPHATE SERPL-MCNC: 2.8 MG/DL (ref 2.5–4.5)
PLATELET # BLD AUTO: 131 10E3/UL (ref 150–450)
POTASSIUM SERPL-SCNC: 3.9 MMOL/L (ref 3.4–5.3)
PROCALCITONIN SERPL IA-MCNC: 1.99 NG/ML
PROT SERPL-MCNC: 6.4 G/DL (ref 6.4–8.3)
RBC # BLD AUTO: 2.37 10E6/UL (ref 4.4–5.9)
SODIUM SERPL-SCNC: 135 MMOL/L (ref 136–145)
WBC # BLD AUTO: 4.8 10E3/UL (ref 4–11)

## 2023-01-10 PROCEDURE — 97535 SELF CARE MNGMENT TRAINING: CPT | Mod: GO | Performed by: OCCUPATIONAL THERAPIST

## 2023-01-10 PROCEDURE — 83605 ASSAY OF LACTIC ACID: CPT | Performed by: HOSPITALIST

## 2023-01-10 PROCEDURE — 80053 COMPREHEN METABOLIC PANEL: CPT | Performed by: HOSPITALIST

## 2023-01-10 PROCEDURE — 85025 COMPLETE CBC W/AUTO DIFF WBC: CPT | Performed by: HOSPITALIST

## 2023-01-10 PROCEDURE — 999N000157 HC STATISTIC RCP TIME EA 10 MIN

## 2023-01-10 PROCEDURE — 94003 VENT MGMT INPAT SUBQ DAY: CPT

## 2023-01-10 PROCEDURE — 999N000125 HC STATISTIC PATIENT MED CONFERENCE < 30 MIN: Performed by: OCCUPATIONAL THERAPIST

## 2023-01-10 PROCEDURE — 120N000017 HC R&B RESPIRATORY CARE

## 2023-01-10 PROCEDURE — 999N000009 HC STATISTIC AIRWAY CARE

## 2023-01-10 PROCEDURE — 71045 X-RAY EXAM CHEST 1 VIEW: CPT

## 2023-01-10 PROCEDURE — 250N000012 HC RX MED GY IP 250 OP 636 PS 637: Performed by: HOSPITALIST

## 2023-01-10 PROCEDURE — 94640 AIRWAY INHALATION TREATMENT: CPT | Mod: 76

## 2023-01-10 PROCEDURE — 999N000253 HC STATISTIC WEANING TRIALS

## 2023-01-10 PROCEDURE — 250N000013 HC RX MED GY IP 250 OP 250 PS 637: Performed by: HOSPITALIST

## 2023-01-10 PROCEDURE — 36415 COLL VENOUS BLD VENIPUNCTURE: CPT | Performed by: HOSPITALIST

## 2023-01-10 PROCEDURE — 87040 BLOOD CULTURE FOR BACTERIA: CPT | Performed by: HOSPITALIST

## 2023-01-10 PROCEDURE — 92507 TX SP LANG VOICE COMM INDIV: CPT | Mod: GN | Performed by: SPEECH-LANGUAGE PATHOLOGIST

## 2023-01-10 PROCEDURE — 250N000009 HC RX 250: Performed by: NURSE PRACTITIONER

## 2023-01-10 PROCEDURE — 250N000013 HC RX MED GY IP 250 OP 250 PS 637: Performed by: NURSE PRACTITIONER

## 2023-01-10 PROCEDURE — 94640 AIRWAY INHALATION TREATMENT: CPT

## 2023-01-10 PROCEDURE — 84145 PROCALCITONIN (PCT): CPT | Performed by: HOSPITALIST

## 2023-01-10 PROCEDURE — 99232 SBSQ HOSP IP/OBS MODERATE 35: CPT | Performed by: HOSPITALIST

## 2023-01-10 PROCEDURE — 97129 THER IVNTJ 1ST 15 MIN: CPT | Mod: GO | Performed by: OCCUPATIONAL THERAPIST

## 2023-01-10 PROCEDURE — 97110 THERAPEUTIC EXERCISES: CPT | Mod: GP

## 2023-01-10 PROCEDURE — 999N000125 HC STATISTIC PATIENT MED CONFERENCE < 30 MIN: Performed by: SPEECH-LANGUAGE PATHOLOGIST

## 2023-01-10 PROCEDURE — 99366 TEAM CONF W/PAT BY HC PROF: CPT | Performed by: PHYSICAL THERAPIST

## 2023-01-10 PROCEDURE — 84100 ASSAY OF PHOSPHORUS: CPT | Performed by: HOSPITALIST

## 2023-01-10 PROCEDURE — 250N000009 HC RX 250: Performed by: HOSPITALIST

## 2023-01-10 PROCEDURE — 94003 VENT MGMT INPAT SUBQ DAY: CPT | Performed by: NURSE PRACTITIONER

## 2023-01-10 RX ORDER — AMOXICILLIN AND CLAVULANATE POTASSIUM 200; 28.5 MG/5ML; MG/5ML
500 POWDER, FOR SUSPENSION ORAL
Status: COMPLETED | OUTPATIENT
Start: 2023-01-11 | End: 2023-01-11

## 2023-01-10 RX ADMIN — NYSTATIN 500000 UNITS: 100000 SUSPENSION ORAL at 09:05

## 2023-01-10 RX ADMIN — NYSTATIN 500000 UNITS: 100000 SUSPENSION ORAL at 21:48

## 2023-01-10 RX ADMIN — WHITE PETROLATUM: 1.75 OINTMENT TOPICAL at 09:06

## 2023-01-10 RX ADMIN — RIFAXIMIN 550 MG: 550 TABLET ORAL at 00:00

## 2023-01-10 RX ADMIN — QUETIAPINE 75 MG: 25 TABLET ORAL at 21:48

## 2023-01-10 RX ADMIN — ACETYLCYSTEINE 2 ML: 200 SOLUTION ORAL; RESPIRATORY (INHALATION) at 19:37

## 2023-01-10 RX ADMIN — ACETYLCYSTEINE 2 ML: 200 SOLUTION ORAL; RESPIRATORY (INHALATION) at 07:03

## 2023-01-10 RX ADMIN — NYSTATIN 500000 UNITS: 100000 SUSPENSION ORAL at 17:40

## 2023-01-10 RX ADMIN — ALBUTEROL SULFATE 2.5 MG: 2.5 SOLUTION RESPIRATORY (INHALATION) at 19:37

## 2023-01-10 RX ADMIN — ACETAMINOPHEN 650 MG: 325 TABLET, FILM COATED ORAL at 00:00

## 2023-01-10 RX ADMIN — LEVETIRACETAM 1000 MG: 100 SOLUTION ORAL at 21:48

## 2023-01-10 RX ADMIN — FLUCONAZOLE 200 MG: 200 TABLET ORAL at 21:49

## 2023-01-10 RX ADMIN — Medication 40 MG: at 09:05

## 2023-01-10 RX ADMIN — LACOSAMIDE 100 MG: 10 SOLUTION ORAL at 09:05

## 2023-01-10 RX ADMIN — NYSTATIN 500000 UNITS: 100000 SUSPENSION ORAL at 12:59

## 2023-01-10 RX ADMIN — Medication 6 MG: at 21:49

## 2023-01-10 RX ADMIN — RIFAXIMIN 550 MG: 550 TABLET ORAL at 21:48

## 2023-01-10 RX ADMIN — FOLIC ACID 1 MG: 1 TABLET ORAL at 09:04

## 2023-01-10 RX ADMIN — Medication 1 DROP: at 14:18

## 2023-01-10 RX ADMIN — ACETAMINOPHEN 975 MG: 325 TABLET, FILM COATED ORAL at 21:50

## 2023-01-10 RX ADMIN — Medication 12.5 MG: at 03:26

## 2023-01-10 RX ADMIN — ACETAMINOPHEN 975 MG: 325 TABLET, FILM COATED ORAL at 14:18

## 2023-01-10 RX ADMIN — AMOXICILLIN AND CLAVULANATE POTASSIUM 500 MG: 200; 28.5 POWDER, FOR SUSPENSION ORAL at 21:50

## 2023-01-10 RX ADMIN — LACTULOSE 20 G: 20 SOLUTION ORAL at 09:04

## 2023-01-10 RX ADMIN — Medication 1 DROP: at 21:49

## 2023-01-10 RX ADMIN — ALBUTEROL SULFATE 2.5 MG: 2.5 SOLUTION RESPIRATORY (INHALATION) at 11:31

## 2023-01-10 RX ADMIN — Medication 1 DROP: at 09:04

## 2023-01-10 RX ADMIN — Medication 1 TABLET: at 09:05

## 2023-01-10 RX ADMIN — INSULIN GLARGINE 20 UNITS: 100 INJECTION, SOLUTION SUBCUTANEOUS at 09:05

## 2023-01-10 RX ADMIN — ALBUTEROL SULFATE 2.5 MG: 2.5 SOLUTION RESPIRATORY (INHALATION) at 07:03

## 2023-01-10 RX ADMIN — ALBUTEROL SULFATE 2.5 MG: 2.5 SOLUTION RESPIRATORY (INHALATION) at 16:38

## 2023-01-10 RX ADMIN — CHLORHEXIDINE GLUCONATE 0.12% ORAL RINSE 15 ML: 1.2 LIQUID ORAL at 21:48

## 2023-01-10 RX ADMIN — Medication 40 MG: at 21:53

## 2023-01-10 RX ADMIN — TACROLIMUS 1 MG: 5 CAPSULE ORAL at 09:05

## 2023-01-10 RX ADMIN — TACROLIMUS 1 MG: 5 CAPSULE ORAL at 17:40

## 2023-01-10 RX ADMIN — ZOLPIDEM TARTRATE 5 MG: 5 TABLET ORAL at 00:00

## 2023-01-10 RX ADMIN — ACETAMINOPHEN 975 MG: 325 TABLET, FILM COATED ORAL at 06:06

## 2023-01-10 RX ADMIN — ACETYLCYSTEINE 2 ML: 200 SOLUTION ORAL; RESPIRATORY (INHALATION) at 11:32

## 2023-01-10 RX ADMIN — ACETYLCYSTEINE 2 ML: 200 SOLUTION ORAL; RESPIRATORY (INHALATION) at 16:38

## 2023-01-10 RX ADMIN — LACOSAMIDE 100 MG: 10 SOLUTION ORAL at 21:49

## 2023-01-10 RX ADMIN — MIDODRINE HYDROCHLORIDE 5 MG: 5 TABLET ORAL at 12:59

## 2023-01-10 RX ADMIN — MICONAZOLE NITRATE ANTIFUNGAL POWDER: 2 POWDER TOPICAL at 09:06

## 2023-01-10 RX ADMIN — LACTULOSE 20 G: 20 SOLUTION ORAL at 21:48

## 2023-01-10 RX ADMIN — CHLORHEXIDINE GLUCONATE 0.12% ORAL RINSE 15 ML: 1.2 LIQUID ORAL at 09:04

## 2023-01-10 RX ADMIN — RIFAXIMIN 550 MG: 550 TABLET ORAL at 09:03

## 2023-01-10 ASSESSMENT — ACTIVITIES OF DAILY LIVING (ADL)
ADLS_ACUITY_SCORE: 66
ADLS_ACUITY_SCORE: 66
ADLS_ACUITY_SCORE: 60
ADLS_ACUITY_SCORE: 66
ADLS_ACUITY_SCORE: 60
ADLS_ACUITY_SCORE: 66
ADLS_ACUITY_SCORE: 66
ADLS_ACUITY_SCORE: 62

## 2023-01-10 NOTE — PROGRESS NOTES
Care progression   Staff: Dr. Barcenas, PT-Charlotte, OT-Lillian, RD-Dinorah, SW-Kb, ST-Matti  Family: Ofe-spouse, Dylan-brother    MD:  Kathleen.  Tube feeding, rectal tube, tele monitor, Nebs.      RD:  NPO. Tube feeding.  Renal formula.  Monitoring weight, muscle mass, protein and calorie intake.      OT:  Slow, steady progress. Working ue strength, endurance, coordination. Focusing on grooming, hygiene, ADLs Cognition testing likely this week.     PT:  Working on standing/transfers.  Standing not functional.  Patient with low strength and endurance, fatigues easily.  Focus on LE strength/endurance and sit to stand.      ST:  On going swallow assessments.  BDT today.  Voice work.  PMV.      SW:  Patient's exact discharge date, time, disposition TBD  SW will continue to follow for psychosocial and emotional support of patient and family. SW to facilitate discharge to ARU vs TCU when pt no longer requires LTACH level of care.      Kb Liu, [facility]/[place]  [phone]

## 2023-01-10 NOTE — PROCEDURES
Hemodialysis Treatment Note    Pre treatment report from Inna Guzman RN.  Consent verified, dated [id number].     Assessment:  Patient is in bed, alert to self,year, president, visiting with his wife. Apical pulse rhythm regular, rate in low 100s.  Patient has trach-dome, lung sounds rhonchi to diminished in bases.  Patient's bruising improving, generalized and LE edema.  Abdomen firm, tender and distended.      Pre weight:  94.8 KG (noted in Epic from today)                       Post weight:  91.6 KG (bed scale)    Run length:  3 hours    Dialyzer/rinse:  Optiflux 160, rinse:  mildly streaked     Total fluid removed (ml):  3000 mls, with prime and rinse back, net fluid acuyxib=4873 mls    Blood Volume Processed:  61.7 L    Pre Treatment Vascular Access:  R tunnel CVC, Biopatch and Tegaderm dressing CDI, last changed on 010423, no s/s of infection.  Limbs prepped per protocol, both aspirate and flush well, treatment initiated with limbs in normal configuration.  Early in treatment, arterial collapsing, BFR decreased, patient repositioned, collapsing continued, lines reversed.  JEANCARLOS Jackman, updated, will try TPA prior to next treatment.      Treatment Summary:  Patient treated on K3 bath per protocol order, last resulted potassium=3.7.  No heparin administered as part of dialysis treatment with exception of CVC dwell post treatment.  Patient tachycardic pre, during and post dialysis.  BP stable.  POC lab drawn towards end of dialysis, potassium=3.3, changed to K4 bath for last 30 minutes of treatment.  Post treatment report provided to Jennifer Moe RN.  For further details, please see Epic dialysis flow sheet and MAR.      Interventions:  -BP, pulse, respirations and oxygen saturations monitored, documented every 15  Minutes and PRN;  -Critline used for fluid monitoring/management, steep slope throughout treatment;  -Goal adjustment per Critline and hemodynamics;  -Staff nurse administered scheduled  midodrine 5 mg.    Post Treatment Vascular Access:  Dressing and Biopatch remain CDI.  NS flush to limbs, heparin 1000 units/ml dwell,   1900 units/limb of CVC.  Limbs capped, clamped, labeled and wrapped with 4x4 gauze.    Plan:  Per renal, following MWF dialysis schedule, TPA to limbs of CVC prior to next dialysis treatment      Shana Dowling RN  [facility]

## 2023-01-10 NOTE — PROGRESS NOTES
REHAB CARE PROGRESSION MEETING:    Medical Team Conference total time 30 minutes.  Elodia Freedman PT present for 18 minutes.  See progress noted dated 1/10/2023 taya by Kb Montague,  for details.

## 2023-01-10 NOTE — PLAN OF CARE
Problem: Pain Acute  Goal: Optimal Pain Control and Function  1/9/2023 1908 by Inna Guzman RN  Outcome: Progressing  1/9/2023 1532 by Inna Guzman RN  Outcome: Progressing     Problem: Enteral Nutrition  Goal: Absence of Aspiration Signs and Symptoms  1/9/2023 1908 by Inna Guzman RN  Outcome: Progressing  1/9/2023 1532 by Inna Guzman RN  Outcome: Progressing  Goal: Feeding Tolerance  1/9/2023 1908 by Inna Guzman RN  Outcome: Progressing  1/9/2023 1532 by Inna Guzman RN  Outcome: Progressing     Problem: Chronic Kidney Disease  Goal: Electrolyte Balance  Outcome: Progressing     Problem: Hemodialysis  Goal: Absence of Infection Signs and Symptoms  Outcome: Progressing  Intervention: Prevent or Manage Infection  Recent Flowsheet Documentation  Taken 1/9/2023 1723 by Inna Guzman RN  Infection Prevention: hand hygiene promoted  Taken 1/9/2023 1232 by Inna Guzman RN  Infection Prevention: hand hygiene promoted     Problem: Artificial Airway  Goal: Effective Communication  Outcome: Progressing     Problem: Anemia  Goal: Anemia Symptom Improvement  Outcome: Progressing  Intervention: Monitor and Manage Anemia  Recent Flowsheet Documentation  Taken 1/9/2023 1723 by Inna Guzman RN  Safety Promotion/Fall Prevention:    activity supervised    bed alarm on    fall prevention program maintained  Taken 1/9/2023 1232 by Inna Guzman RN  Safety Promotion/Fall Prevention:    activity supervised    bed alarm on    fall prevention program maintained   Goal Outcome Evaluation:      Patient denied pain so far this shift, sepsis BPA fired this shift, lactic acid done and it was 1.0, potassium was 3.3 (patient was given a potassium bath with dialysis-provider on call (Brayan) will be informed. Patient has speaking valve in at this time-tolerating same okay so far. Patient's wife visited, stated she will visit patient again in the morning at 12 noon, patient continues to be on cardiac monitoring-rhythm has  been sinus tachy, respiration runs high too (see flowsheet for values), patient will have labs in am, on call provider was informed of potassium level of 3.3

## 2023-01-10 NOTE — PROGRESS NOTES
[facility]    Medicine Progress Note - Hospitalist Service    Date of Admission:  12/29/2022  Brief history:  Blanco Osborne is an 57 year old male who is transferred from [facility] f to [facility] for  IV antibiotic treatment.  Patient has multiple medical problems including history of liver transplant 2016, PAF on chronic anticoagulation, history of DM2, CVA, HTN, CHRISTIAN on BiPAP, and history of alcohol abuse in the past causing liver damage ending with liver transplant.  About 6 weeks ago he had respiratory arrest and was diagnosed with infection with parainfluenza and strep pneumoniae and acute on chronic renal insufficiency ending with CRF needing 3/week hemodialysis.  During this period he was diagnosed with cirrhosis of transplanted liver and hyperammonia.  Currently he is on Lactulose and Rifaximin.        On 12/14/22, due to CIP, he was transferred to [facility] for the first time with Trach, PEG and Rt chest tube.    On 12/16/2022 patient was transferred back to [facility] due to respiratory insufficiency secondary to hydropneumothorax and drainage of 900 cc bloody pleural effusion, bloody stool and transfusion of 2 units PRBC.      On 12/20/2022 he had another episode of PEA arrest followed by CPR x2 minutes and possibly had seizure activity in [facility]. 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  He has been on ventilator and has improved to trach dome tolerating up to 6 hours so far. He had chest tube which was removed.    He had SBP with growth of lactobacillus and is currently on Unasyn which will be switched to Augmentin orally through 1/12/2023.    Pleural effusion grew Candida and is on micafungin.  He is also on  due to cirrhosis and hyperammonia.  He is anemic and is on Retacrit.   He is on lacosamide and Keppra due to history of seizure.  There is question of him drinking again  causing him cirrhosis of the transplanted liver.  LTAC course:  1/10/2023.  I attended patient care conference earlier today.  Worsening ascites compared to previous days.  Plan for paracentesis per IR tomorrow    Assessment & Plan   Principal Problem:    Acute on chronic respiratory failure with hypoxia (H)  Active Problems:    Acquired immunocompromised state (H)    Cirrhosis of liver (H)    NAFLD (nonalcoholic fatty liver disease)    Liver transplanted (H)    Immunosuppression (H)    Acute kidney injury (H)    Spontaneous bacterial peritonitis (H)    Critical illness myopathy    History of sudden cardiac arrest, PEA    Dependent on hemodialysis (H)    Acute now chronic respiratory failure requiring tracheostomy..  Initial event was parainfluenza and strep pneumo pneumonia.  Had failed extubation x2 and tracheostomy performed last hospitalization.  Had additional complications of bilateral pneumothoraces.  (Most recently was a hydropneumothorax where fluid grew Candida.. Chest tube was placed and removed . Had decent tolerance for pressure support and trach dome but after few hours and trach dome 12/27 had a pretty significant episode of desaturation.   Plan:  -Wean ventilator per pulmonology.  -Good pulmonary toilet  -Tracheostomy care per RT    History PEA  Hypotension  PEA arrest prior to his previous admission and 1 episode of PEA associated with desaturation on 12/20.  No structural cardiac disease but does have A. Fib which neurologist thought might have been contributory to his embolic strokes.  Anticoagulation been restarted with stable labs although high risk for bleeding seconday to thrombocytopenia.  Also has developed baseline hypotension and is on midodrine 10 mg 3 times daily.  Plan:  Continue Eliquis.    Continue current midodrine dose     Seizure after hypoxic event.  This is in the context of baseline multifactorial encephalopathy secondary to his ongoing critical illness and prior cardiac arrests  and prior strokes and cirrhosis with hepatic encephalopathy. MRI of head of 12/20/22 reveals no PRES or leptomeningeal enhancement but scattered.  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   Plan:  - Continue with  Keppra  indefinitely.  Neurology follow-up.   - Anticoagulation indefinitely for secondary stroke prevention.  On hold since 1/6/2023 due to tracheal bleeding during suction.  Patient is currently in sinus rhythm.  And telemetry and monitored.  - Tylenol TID for headache.        ESRD: Now on iHD.  No return of renal function.  - MWF schedule      Infectious disease  1. 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 4.  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  Have had some conversations within our group and with transplant ID and general infectious disease.  General consensus is that ganciclovir probably could be discontinued  2. H/o Strep/Parainfluenza infection last hospitalization. Completed treatment course  3. 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  4.  Lactobacillus growing in the broth 4 days after paracentesis.  Cell count was very low.  5.  Candida in left pleural fluid cultures.  Sensitivities not resulted.  6.  Immunosuppressed on tacrolimus.  Discussed with transplant service at the [place] who felt that given the absence of rejection on his most recent biopsy and ongoing issues with infection continuing with 1 twice daily tacrolimus is the best current option.  They have seen his subtherapeutic trough levels.  Plan:  1.  Confirm with transplant ID whether or not we can stop ganciclovir.  2.  Unasyn for 2 weeks total.  Change to Augmentin through 1/12/2023.  3.  At least 4 weeks of antimicrobial for the Candida.  If sensitive to fluconazole could de-escalate from micafungin.  4.  Continue tacrolimus as above and we are tapering steroids    Post transplant cirrhosis.  Main manifestations of this are hepatic encephalopathy and ascites.  Hepatologist at  [place] felt that most likely reason for the cirrhosis was metabolic syndrome or alcohol intake.  There was no evidence of rejection on biopsy and there was some evidence of regeneration. Paracentesis done on 12/22/2022 with lack of bacillus in the broth indicating SBP low cell count was very low  Plan:  1.  Intermittent paracentesis as needed.  2.  Tacrolimus 1 mg BID and tapering steroids   3.  Lactulose and Rifaximin as above  4.  Treat SBP for 2 weeks.  5.  He has an appointment in April with Dr. Simms if he is out of the hospital.    Diabetes mellitus type 2  1. DM.  Titrating insulin. He needs supplemental coverage.  2. He is on steroids which we are weaning  Plan:  -Continue with current regimen.   -Hypoglycemic protocol in place    Moderate malnutrition, protein and calorie type.  -Continue with tube feeds via PEG tube  -Nutritionist consulted and following      Pancytopenia due cirrhosis, ESRD and hypersplenism. WBC count has improved.  However he needs frequent PRBC transfusion due Hb <7.0.   Patient is on anticoagulation for PAF.  Currently in sinus rhythm.  Is off anticoagulation due to tracheal bleeding.  Plan:  1.  Eliquis is on hold for now.     Code Status: Full     Diet: Adult Formula Drip Feeding: Continuous Novasource Renal; Gastrostomy; Goal Rate: 60; mL/hr    DVT Prophylaxis: DOAC  Nixon Catheter: Not present  Central Lines: PRESENT      Cardiac Monitoring: ACTIVE order. Indication: Tachyarrhythmias, acute (48 hours)  Code Status: Full Code      Disposition Plan     Expected Discharge Date: 01/12/2023    Discharge Delays: Complex Discharge    Discharge Comments: Vent. Trach. Phase 1. PEG.  Rectal Tube.      The patient's care was discussed with the Bedside Nurse, Patient and Patient's Family.    Yahir Barcenas MD  Hospitalist Service  LTACH  Securely message with the Vocera Web Console (learn more here)  Text page via [employer] Paging/Directory     Clinically Significant Risk Factors        #  Hypokalemia: Lowest K = 3.3 mmol/L in last 2 days, will replace as needed   # Hypocalcemia: Lowest iCa = 1.2 mg/dL in last 2 days, will monitor and replace as appropriate  # Hypercalcemia: corrected calcium is >10.1, will monitor as appropriate    # Hypoalbuminemia: Lowest albumin = 1.8 g/dL at 12/29/2022  4:40 AM, will monitor as appropriate             # Severe Malnutrition: based on nutrition assessment      _____________________________________________________________________    Interval History   Patient is in bed comfortably.  Had care conference earlier today.  Making some progress.  Plan for paracentesis tomorrow.  No new events overnight per RN    Data reviewed today: I reviewed all medications, new labs and imaging results over the last 24 hours. I personally reviewed no images or EKG's today.    Physical Exam   Vital Signs: Temp: 98.1  F (36.7  C) Temp src: Oral BP: 106/66 Pulse: 107   Resp: 24 SpO2: 100 % O2 Device: Trach dome Oxygen Delivery: 30 LPM  Weight: 191 lbs 0 oz   General: Patient is lying in bed comfortably, he is awake alert oriented to person  HEENT: Head appears normocephalic atraumatic, eyes pupils appear equal round and reactive to light  Neck: Viable trach noted  Lungs: He has a normal respiratory effort, good air entry is noted no obvious wheezing noted  Heart: He has a good S1 and S2 no obvious murmurs, peripheral pulses are palpable bilaterally  Abdomen: Soft, distended, ascites noted, bowel sounds noted  Musculoskeletal: Good muscle tone, PICC line in right brachial artery, nails and digits appear acyanotic I did not examine his range of motion  Skin: No obvious rashes on inspection, warm to touch, please refer to nursing/wound care note for complete skin exam    Data   Recent Labs   Lab 01/10/23  0608 01/10/23  0603 01/09/23  1824 01/09/23  0656 01/04/23  0629 01/04/23  0623   WBC 4.8  --   --   --   --   --    HGB 7.2*  --  9.9* 7.3*   < > 6.3*   *  --   --   --   --   --     *  --   --   --   --  58*   *  --  137 134*   < >  --    POTASSIUM 3.9  --  3.3* 3.7   < >  --    CHLORIDE 96*  --   --  94*  --   --    CO2 28  --   --  27  --   --    BUN 50.5*  --   --  76.0*  --   --    CR 2.28*  --   --  3.14*  --   --    ANIONGAP 11  --   --  13  --   --    KELLY 8.9  --   --  9.2  --   --    * 123* 116 159*   < >  --    ALBUMIN 2.0*  --   --   --   --   --    PROTTOTAL 6.4  --   --   --   --   --    BILITOTAL 0.5  --   --   --   --   --    ALKPHOS 361*  --   --   --   --   --    ALT 11  --   --   --   --   --    AST 24  --   --   --   --   --     < > = values in this interval not displayed.     Medications     dextrose       - MEDICATION INSTRUCTIONS -         sodium chloride 0.9%  300 mL Hemodialysis Machine During Dialysis/CRRT (from stock)     acetaminophen  975 mg Per Feeding Tube Q8H BIJU    Or     acetaminophen  650 mg Rectal Q8H BIJU     acetylcysteine  2 mL Nebulization 4x daily     albuterol  2.5 mg Nebulization 4x daily     [START ON 1/11/2023] amoxicillin-clavulanate  500 mg Oral or Feeding Tube Once per day on Mon Wed Fri     amoxicillin-clavulanate  500 mg Oral or Feeding Tube QPM     [Held by provider] apixaban ANTICOAGULANT  5 mg Oral or Feeding Tube BID     carboxymethylcellulose PF  1 drop Both Eyes TID     chlorhexidine  15 mL Mouth/Throat BID     epoetin mirta-epbx  40,000 Units Subcutaneous Weekly     fluconazole  200 mg Per Feeding Tube At Bedtime     folic acid  1 mg Oral or Feeding Tube Daily     heparin Lock (1000 units/mL High concentration)  1,900 Units Intracatheter During Dialysis/CRRT (from stock)     heparin Lock (1000 units/mL High concentration)  1,900 Units Intracatheter During Dialysis/CRRT (from stock)     insulin aspart  1-6 Units Subcutaneous Daily     insulin glargine  20 Units Subcutaneous Daily     lacosamide  100 mg Oral or Feeding Tube BID     lactulose  20 g Oral or Feeding Tube BID     levETIRAcetam  1,000 mg Oral or Feeding Tube  Q24H     Melatonin  6 mg Oral or NG Tube At Bedtime     midodrine  10 mg Oral During Dialysis/CRRT (from stock)     midodrine  5 mg Oral or Feeding Tube TID w/meals     mineral oil-hydrophilic petrolatum   Topical Daily     multivitamin RENAL  1 tablet Per Feeding Tube Daily     nystatin  500,000 Units Swish & Swallow 4x Daily     pantoprazole  40 mg Per Feeding Tube BID     QUEtiapine  75 mg Oral or Feeding Tube At Bedtime     rifaximin  550 mg Per Feeding Tube BID     sodium chloride (PF)  10-30 mL Intracatheter Q8H     tacrolimus  1 mg Oral or Feeding Tube BID   In speaking valve

## 2023-01-10 NOTE — PLAN OF CARE
Problem: Chronic Kidney Disease  Goal: Optimal Coping with Chronic Illness  Outcome: Progressing  Goal: Optimal Functional Ability  Outcome: Progressing     Problem: Mechanical Ventilation Invasive  Goal: Optimal Device Function  Outcome: Progressing   Goal Outcome Evaluation:         Pt confused want to leave and go home, refused cares.dialysis done this elliott see note.continue TM.no prn given.cares met.

## 2023-01-10 NOTE — PLAN OF CARE
Problem: Pain Acute  Goal: Optimal Pain Control and Function  Outcome: Progressing     Problem: Enteral Nutrition  Goal: Absence of Aspiration Signs and Symptoms  Outcome: Progressing     Problem: Chronic Kidney Disease  Goal: Acceptable Pain Control  Outcome: Progressing     Problem: Artificial Airway  Goal: Effective Communication  Outcome: Progressing   Goal Outcome Evaluation:  Patient denied pain for most part of the shift, reported to provider (Narinder) during round that he has occasional abdominal pain. Patient seems to be tolerating tube feeding, gastric residual was 45 at 1300 though, feeding tube clamps would not stay closed due to possible pressure from his ascites (provider was informed of this). Patient's midodrine was held x 1 so far this shift (blood pressure was 129/74 at 0901, given later in the shift (blood pressure was 106/66 for this). Sepsis BPA fired on patient this shift (fired yesterday-Monday (1/9) in the evening, provider was informed (had blood tests that included blood culture done), chest xray was also done for sepsis (yet to send urine for UA with microscopic reflex culture) . Patient had a care conference this shift, continue to be on cardiac monitoring-rhythm remains sinus tachy per report, VBG to be done in am, patient was up in chair in the morning (noted to be more tired this shift-patient was given Ambien and Seroquel at night per report. Patient had a blue dye test done-no immediate sign of aspiration noted per report, waiting for RT for an update if there is any sign of delayed aspiration. Patient is now on Vent weaning phase 2 (TM/PMVday, TM/Cuff up at night, it was later reported by the RT (Dora) that patient had sign of delayed aspiration (a light blue dye was suctioned out)

## 2023-01-10 NOTE — PROGRESS NOTES
"Speech pathology: Blue dye test     01/10/23 0900   Appointment Info   Signing Clinician's Name / Credentials (SLP) Matti Santos MA, CCC-SLP   Rehab Comments (SLP) 2 hours maxConsulted with RT who reports patient trach mask and speaking valve 2 hours max   General Information   Onset of Illness/Injury or Date of Surgery 12/16/22   Referring Physician Fenton   Pertinent History of Current Problem Per pulmonary note, dated 12/29/22: \"57yoM with liver transplant(2016), recent prolonged hospitalization 11/12-12/15 for PEA cardiac arrest, Strep/Parainfluenza pneumonia with ARDS, MODS, right PTX requiring chest tube since removed.  Now on HD, s/p trach/PEG.  Discharged to LTAC and sent out the next day with new left PTX & GIB.  Recent hospitalization c/b GIB, PEA arrest and seizure on 12/20.  Left CT removed on 12/16.  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  Transferred back to LTAC 12/29.\"   General Observations Patient up in wheelchair working with PT upon arrival.  Reports fatigue but appears alert.  Cooperative   Type of Evaluation   Type of Evaluation Swallow Evaluation   Tracheostomy Assessment (Speaking Valve)   Comment, Tracheostomy (Speaking Valve) #7 Bivona with speaking valve in place   Respiratory Status (Speaking Valve)   Oxygen Supply trach mask   Oral Motor   Oral Musculature generally intact   Structural Abnormalities none present   Mucosal Quality adequate   Dentition (Oral Motor)   Dentition (Oral Motor) natural dentition   Facial Symmetry (Oral Motor)   Facial Symmetry (Oral Motor) WNL   Lip Function (Oral Motor)   Lip Range of Motion (Oral Motor) WNL   Tongue Function (Oral Motor)   Tongue ROM (Oral Motor) WNL   Tongue Strength (Oral Motor) WNL   Tongue Coordination/Speed (Oral Motor) WNL   Vocal Quality/Secretion Management (Oral Motor)   Vocal Quality (Oral " Motor) breathy;hoarse;hypophonic   General Swallowing Observations   Current Diet/Method of Nutritional Intake (General Swallowing Observations, NIS) NPO   Past History of Dysphagia Previous blue dye test for evaluation of management of secretions on 12/30/2022, notable for no immediate aspiration but positive for delayed aspiration.   Comment, General Swallowing Observations Blue Dye Test: The reason for the current test was to evaluate swallow/secretion management. The patient had minimal oral secretions and the patient had minimal  tracheal secretions. The dye was given with the speaking valve in place. A spontaneous swallow was elicited. Hyolaryngeal movement appeared reduced. There was no blue dye noted around trach site. Upon initial tracheal suctioning with cuff down there was no blue dye observed, confirmed by RT present. Hand off to RT, Kailey, was completed in room. Follow-up suctioning for delayed aspiration to be completed by RT. Please see RT notes for details.   Speech, Language, Voice Communication&/or Auditory Processing   Treatment of Speech, Language, Voice Communication&/or Auditory Processing Minutes (02336) 8   Treatment Detail/Skilled Intervention See results of blue dye test above   SLP Discharge Planning   SLP Plan Speaking valve trial, simple cognitive linguistic tasks, clinical swallow evaluation once tolerating speaking valve on trach mask consistently   SLP Discharge Recommendation Transitional Care Facility   SLP Rationale for DC Rec Communication, cognition, and swallow function are below baseline   SLP Brief overview of current status  No overt signs and symptoms of aspiration of secretions immediately on blue dye test.  Given delayed aspiration on previous evaluation, warrants ongoing monitoring to ensure consistent management of secretions.     ADDENDUM: RT reports light blue secretions suctioned from trach 2-3 hours after presentation of blue dye, suggesting mild aspiration of  secretions over time. Appropriate to continue speaking valve but thorough and consistent oral hygiene becomes even more critical to reduce risk of infection from aspiration of oral bacteria.

## 2023-01-10 NOTE — PLAN OF CARE
7 PM to 7 AM RT PROGRESS NOTE     DATA:     CURRENT SETTINGS:             TRACH TYPE / SIZE:  7 Bivona placed 1/9/23             MODE:   AC 12/450/+5/25%     PMV for 2 hours and 10 minutes. Pt tolerated until around the 2 hour rachel, when he c/o SOB and requested to have the PMV off. He needed sxn at the time, so this may have contributed to SOB.    ACTION:             THERAPIES:   QID Albuterol and Mucomyst neb given             SUCTION:                           FREQUENCY:   X 5                        AMOUNT:   small x 2, large x 1, copious x 2                        CONSISTENCY:   thick                        COLOR:   white             SPONTANEOUS COUGH EFFORT/STRENGTH OF EFFORT (not elicited by suctioning): Good                              WEANING PHASE:   Extend TM wean into night.                        WEAN MODE:    30% @ 30 LPM HFTM with cuff up.                        WEAN TIME:   0840-0405. Pt seemed to tolerate wean. He was sleeping at the time of switch. Pt weaned 19 hours and 25 minutes.                        END WEAN REASON:   End of designated wean time.     RESPONSE:             BS:   Clear and dim to Rhonchi post sxn             VITAL SIGNS:   SATs %, -124, RR 22-24 and a little shallow.             RISK FOR SELF DECANNULATION:  No     NOTE / PLAN:   Wean on TM continuously Tuesday.     Problem: Mechanical Ventilation Invasive  Goal: Optimal Device Function  Outcome: Progressing  Intervention: Optimize Device Care and Function  Recent Flowsheet Documentation  Taken 1/10/2023 0103 by Ivon Clayton, RT  Airway Safety Measures:    all equipment/monitors on and audible    manual resuscitator/mask/valve in room  Taken 1/9/2023 2228 by Ivon Clayton, RT  Airway Safety Measures:    all equipment/monitors on and audible    manual resuscitator/mask/valve in room  Taken 1/9/2023 1948 by Ivon Clayton, RT  Airway Safety Measures:    all equipment/monitors on and audible    manual  resuscitator/mask/valve in room  Taken 1/9/2023 1930 by Ivon Clayton, RT  Airway Safety Measures:    all equipment/monitors on and audible    manual resuscitator/mask/valve in room  Goal: Mechanical Ventilation Liberation  Outcome: Progressing  Goal: Optimal Nutrition Delivery  Outcome: Progressing  Goal: Absence of Device-Related Skin and Tissue Injury  Outcome: Progressing  Goal: Absence of Ventilator-Induced Lung Injury  Outcome: Progressing

## 2023-01-10 NOTE — PROGRESS NOTES
Pulmonary Progress Note    Admit Date: 12/29/2022  CODE: Full Code    Assessment/Plan:   57yoM with liver transplant(2016), recent prolonged hospitalization 11/12-12/15 for PEA cardiac arrest, Strep/Parainfluenza pneumonia with ARDS, MODS, ?aspergillus pna s/p 1 week of voriconazole, right PTX requiring chest tube since removed.  Now HD dependent, s/p trach/PEG.  Course further c/b new left PTX requiring chest tube(since removed), b/l pleural effusions, ascites, mucus plugging, PEA arrest and seizure on 12/20, pleural fluid cxs growing candida parapsilosis & ascites cx growing lactobacillus on antimicrobials, and CSF panel positive for HHV6 ultimately decided to not treat.  Admitted to LTAC 12/29.     Discussion of pertinent problems:  Acute hypoxic/hypercapnic respiratory failure s/p trach: Progressing slowly with trach dome trials and AC at night as he is severely deconditioned.  Overall improving     Tracheostomy in place: 8 Shiley placed 11/29.  Changed to 6 Shiley prox xlt 12/8, unclear why.  Downsized to 7 Bivona 1/9 without issue.      Dysphagia s/p PEG tube.  BDT 12/30 with no immediate but moderate delayed aspiration 45min later.  Tolerating PMV trials well after trach downsize     Candida in left pleural fluid cultures: started on Micafungin switched to fluconazole 1/3 based on cx sensitivities.    - Cont fluconazole for ~4 week total course based on imaging     LORETTA on MWF HD: followed by Nephrology     S/p Liver transplant with post transplant cirrhosis & ascites s/p paracentesis 12/22: on [place] and prednisone    CHRISTIAN on home BiPAP.  Managed currently with trach and vent     lactobacillus peritonitis started on Unasyn transitioned to PO Augmentin.    - Augmentin duration per ID & primary     Seizure after hypoxic event on 12/20 with subsequent seizure 12/21 during dialysis:   - Keppra and vimpat indefinitely     Multifocal acute ischemic strokes on MRI(12/20).    - Eliquis now on hold d/t acute anemia with  bloody secretions    Hx b/l pneumothoraces: right CT first placed 11/16, removed, then replaced by IR on 12/12, removed again 12/19.  Left chest tube placed 12/16, ?hemithorax, CT removed on 12/19    Hx mucus plugging of bronchi.  Bronch on 12/20 with minimal secretions.  Mucus plug suctioned 1/4 and restarted on metanebs, stopped d/t bloody secretions     Acute on chronic Anemia: s/p 1 unit PRBCs 1/4 for Hgb 6.3.  Intermittent bloody tracheal secretions, Apixaban held 1/4.  Hgb 6.8 on 1/6 s/p 1 unit PRBCs, and had large amount of bloody secretions; CTA neck & C/A/P unremarkable for acute bleed.  Bloody secretions now resolved.     Plan:   - Phase 2 weans: TM as tolerated.  Check VBG in the morning  - Repeat BDT today - no immediate, light amount of delayed.  Ok with PMV during the day, then remove PMV and inflate cuff for the night   - Bronchial hygiene with Albuterol + mucomyst nebs QID.     - Moderate right pleural effusion and large ascites with associated discomfort and dyspnea: plan for a right thoracentesis and paracentesis - Not emergent, discussed with [facility] [place] [place] about timing and likely will plan for Thursday.  Keep Apixaban on hold until after procedures and will decide about restarting at that time     Henry Fenton CNP  Pulmonary Medicine  [facility]  Pager [phone]    Subjective/Interim Events:   - denies sob, n/v, fever, chills.    Tracheal secretions:  Overnight - x5, sm/lg, thick, white  Yesterday - x7, sm/lg, thick    Cough strength: weak/moderate    Current phase of ventilator weaning pathway:  Phase 2    Ventilator weaning results   1/8: TM for 17hr 53min (tolerated well  1/9: 30L/30%TM for 19hr 25min.  PMV for 2hr x2     Clinical status discussed today with respiratory therapist, patient, wife, brother    Medications:       dextrose       - MEDICATION INSTRUCTIONS -         sodium chloride 0.9%  300 mL Hemodialysis Machine During Dialysis/CRRT (from stock)     acetaminophen  975 mg Per Feeding  Tube Q8H BIJU    Or     acetaminophen  650 mg Rectal Q8H BIJU     acetylcysteine  2 mL Nebulization 4x daily     albuterol  2.5 mg Nebulization 4x daily     amoxicillin-clavulanate  500 mg Oral or Feeding Tube QPM     amoxicillin-clavulanate  500 mg Per Feeding Tube Once per day on Mon Wed Fri     [Held by provider] apixaban ANTICOAGULANT  5 mg Oral or Feeding Tube BID     carboxymethylcellulose PF  1 drop Both Eyes TID     chlorhexidine  15 mL Mouth/Throat BID     epoetin mirta-epbx  40,000 Units Subcutaneous Weekly     fluconazole  200 mg Per Feeding Tube At Bedtime     folic acid  1 mg Oral or Feeding Tube Daily     heparin Lock (1000 units/mL High concentration)  1,900 Units Intracatheter During Dialysis/CRRT (from stock)     heparin Lock (1000 units/mL High concentration)  1,900 Units Intracatheter During Dialysis/CRRT (from stock)     insulin aspart  1-6 Units Subcutaneous Daily     insulin glargine  20 Units Subcutaneous Daily     lacosamide  100 mg Oral or Feeding Tube BID     lactulose  20 g Oral or Feeding Tube BID     levETIRAcetam  1,000 mg Oral or Feeding Tube Q24H     Melatonin  6 mg Oral or NG Tube At Bedtime     midodrine  10 mg Oral During Dialysis/CRRT (from stock)     midodrine  5 mg Oral or Feeding Tube TID w/meals     mineral oil-hydrophilic petrolatum   Topical Daily     multivitamin RENAL  1 tablet Per Feeding Tube Daily     nystatin  500,000 Units Swish & Swallow 4x Daily     pantoprazole  40 mg Per Feeding Tube BID     QUEtiapine  75 mg Oral or Feeding Tube At Bedtime     rifaximin  550 mg Per Feeding Tube BID     sodium chloride (PF)  10-30 mL Intracatheter Q8H     tacrolimus  1 mg Oral or Feeding Tube BID         Exam/Data:   Vitals  /68 (BP Location: Left arm)   Pulse 106   Temp 98.1  F (36.7  C) (Axillary)   Resp 23   Wt 86.6 kg (191 lb)   SpO2 100%   BMI 25.90 kg/m    BP - Mean:  [] 81  I/O last 3 completed shifts:  In: 2836 [NG/GT:2836]  Out: 3400 [Other:2500;  Stool:900]  Weight change: -3.221 kg (-7 lb 1.6 oz)    Vent Mode: Trach collar  FiO2 (%): 30 %  Resp Rate (Set): 12 breaths/min  Tidal Volume (Set, mL): 450 mL  PEEP (cm H2O): 5 cmH2O  Resp: 23      EXAM:  Gen: no distress lying in bed   HEENT: NT, trach midline/intact, significant peristomal ecchymosis(improving), PMV on, no stridor, soft voice   CV: RRR  Resp: CTAB; non-labored   Abd: large, distended, soft, mildly tender, BS hypoactive  Skin: no visible rashes or lesions, scattered ecchymosis, fragile   Ext: mild pedal edema, weak  Neuro: alert, follows commands    ROS:  A 10-system review was obtained and is negative with the exception of the symptoms noted above.    Labs:  Basic Metabolic Panel  Recent Labs   Lab 01/10/23  0608 01/10/23  0603 01/09/23  1824 01/09/23  0656 01/06/23  0631 01/05/23  2352   *  --  137 134*  --  136   POTASSIUM 3.9  --  3.3* 3.7  --  3.5   CHLORIDE 96*  --   --  94*  --   --    CO2 28  --   --  27  --   --    BUN 50.5*  --   --  76.0*  --   --    CR 2.28*  --   --  3.14*  --   --    * 123* 116 159*   < > 121    < > = values in this interval not displayed.     CBC RESULTS: Recent Labs   Lab Test 01/09/23  0656 01/05/23  1442 01/04/23  0623 12/27/22  0420   WBC  --   --   --  7.5   RBC  --   --   --  2.76*   HGB 7.3*   < > 6.3* 8.8*   HCT  --   --   --  29.4*   MCV  --   --   --  107*   MCH  --   --   --  31.9   MCHC  --   --   --  29.9*   RDW  --   --   --  18.7*   PLT  --   --  58* 63*    < > = values in this interval not displayed.      Venous Blood Gas  Recent Labs   Lab 01/09/23  1824 01/05/23  2352   PHV 7.41 7.43   PCO2V 51* 49   PO2V 27 39   HCO3V 30 31*   MITA 7.6* 8.0*        RADIOLOGY: Personally reviewed; radiology read below   CTA CHEST ABDOMEN PELVIS W CONTRAST 1/6/2023                                                                   IMPRESSION:  1. No obvious active bleeding identified.  2. Large amount of abdominal/pelvic ascites, splenomegaly, and  scattered varicosities. The above findings would be most typical for sequelae of portal venous hypertension.  3. Significant stranding of the omental fat and intraperitoneal fat, this is nonspecific, but may be reactive or neoplastic in nature.  4. Percutaneous gastrostomy tube, tracheostomy, and the rectal tube appear to be in good position.  5. Central catheter with tip in the inferior right atrium.  6. Mild to moderate right pleural effusion and mild left pleural effusion, both of these appear to be loculated given the peripheral enhancement. Presumed sequelae of prior hematoma seen in the right lung.  7. Minute amount of gas seen associated with the left pleural effusion and this is presumed to be iatrogenic in nature.  8. Slight filling defect within the lower trachea most typical for slight mucous.  9. Enlargement of the main pulmonary artery presumed to represent sequelae of pulmonary arterial hypertension.   ----------------  CTA NECK WITH CONTRAST 1/6/2023     INDICATION: Tracheostomy in place, worsening bloody tracheal secretions, rule out tracheoinnominate fistula.                                                                   IMPRESSION:   1.  No significant stenosis or dissection.  2.  No definite findings to suggest a tracheoinnominate artery fistula.  ----------------------  XR CHEST  1/4/2023                                                                   IMPRESSION:      Right PICC terminates in the middle third of the SVC. Tunneled right jugular approach dialysis catheter terminates in the right atrium. Tracheostomy resides within the tracheal air column.     Persistent shallow lung inflation. Territories of bibasilar atelectasis are similar. Small right pleural effusion visible in the right lateral sulcus and layering into the interlobar fissures is similar. No enlarging pleural effusion. No pneumothorax.     Unchanged heart and mediastinal contours. Unchanged oblique interface just lateral  to the dialysis catheter/SVC and corresponds to rounded atelectasis on prior CT.

## 2023-01-11 ENCOUNTER — APPOINTMENT (OUTPATIENT)
Dept: SPEECH THERAPY | Facility: CLINIC | Age: 58
DRG: 207 | End: 2023-01-11
Attending: HOSPITALIST
Payer: COMMERCIAL

## 2023-01-11 ENCOUNTER — APPOINTMENT (OUTPATIENT)
Dept: PHYSICAL THERAPY | Facility: CLINIC | Age: 58
DRG: 207 | End: 2023-01-11
Attending: HOSPITALIST
Payer: COMMERCIAL

## 2023-01-11 ENCOUNTER — APPOINTMENT (OUTPATIENT)
Dept: OCCUPATIONAL THERAPY | Facility: CLINIC | Age: 58
DRG: 207 | End: 2023-01-11
Attending: HOSPITALIST
Payer: COMMERCIAL

## 2023-01-11 LAB
BASE EXCESS BLDV CALC-SCNC: 6.4 MMOL/L (ref -8.1–1.9)
BASE EXCESS BLDV CALC-SCNC: 6.9 MMOL/L (ref -8.1–1.9)
CA-I BLD-MCNC: 1.2 MMOL/L (ref 1.11–1.3)
CA-I BLD-MCNC: 1.23 MMOL/L (ref 1.11–1.3)
GLUCOSE BLD-MCNC: 151 MG/DL (ref 70–125)
GLUCOSE BLD-MCNC: 152 MG/DL (ref 70–125)
GLUCOSE BLDC GLUCOMTR-MCNC: 158 MG/DL (ref 70–99)
HCO3 BLDV-SCNC: 29 MMOL/L (ref 24–30)
HCO3 BLDV-SCNC: 30 MMOL/L (ref 24–30)
HGB BLD-MCNC: 7.1 G/DL (ref 13.3–17.7)
HGB BLD-MCNC: 7.4 G/DL (ref 13.3–17.7)
LACTATE BLD-SCNC: 1.4 MMOL/L (ref 0.7–2)
LACTATE BLD-SCNC: 1.5 MMOL/L (ref 0.7–2)
LDH SERPL L TO P-CCNC: 233 U/L (ref 0–250)
PCO2 BLDV: 48 MM HG (ref 35–50)
PCO2 BLDV: 50 MM HG (ref 35–50)
PH BLDV: 7.41 [PH] (ref 7.35–7.45)
PH BLDV: 7.41 [PH] (ref 7.35–7.45)
PHOSPHATE SERPL-MCNC: 3.1 MG/DL (ref 2.5–4.5)
PO2 BLDV: 34 MM HG (ref 25–47)
PO2 BLDV: 41 MM HG (ref 25–47)
POTASSIUM BLD-SCNC: 3.5 MMOL/L (ref 3.5–5)
POTASSIUM BLD-SCNC: 3.5 MMOL/L (ref 3.5–5)
PROT SERPL-MCNC: 6.3 G/DL (ref 6.4–8.3)
SATV LHE POCT: 64.1 % (ref 70–75)
SATV LHE POCT: 76.7 % (ref 70–75)
SODIUM BLD-SCNC: 137 MMOL/L (ref 136–145)
SODIUM BLD-SCNC: 137 MMOL/L (ref 136–145)

## 2023-01-11 PROCEDURE — 120N000017 HC R&B RESPIRATORY CARE

## 2023-01-11 PROCEDURE — 94003 VENT MGMT INPAT SUBQ DAY: CPT | Performed by: NURSE PRACTITIONER

## 2023-01-11 PROCEDURE — 250N000013 HC RX MED GY IP 250 OP 250 PS 637: Performed by: HOSPITALIST

## 2023-01-11 PROCEDURE — 99232 SBSQ HOSP IP/OBS MODERATE 35: CPT | Performed by: HOSPITALIST

## 2023-01-11 PROCEDURE — 92526 ORAL FUNCTION THERAPY: CPT | Mod: GN | Performed by: SPEECH-LANGUAGE PATHOLOGIST

## 2023-01-11 PROCEDURE — 82330 ASSAY OF CALCIUM: CPT

## 2023-01-11 PROCEDURE — 92507 TX SP LANG VOICE COMM INDIV: CPT | Mod: GN | Performed by: SPEECH-LANGUAGE PATHOLOGIST

## 2023-01-11 PROCEDURE — 250N000013 HC RX MED GY IP 250 OP 250 PS 637: Performed by: NURSE PRACTITIONER

## 2023-01-11 PROCEDURE — 999N000157 HC STATISTIC RCP TIME EA 10 MIN

## 2023-01-11 PROCEDURE — 999N000009 HC STATISTIC AIRWAY CARE

## 2023-01-11 PROCEDURE — 250N000009 HC RX 250: Performed by: NURSE PRACTITIONER

## 2023-01-11 PROCEDURE — 97110 THERAPEUTIC EXERCISES: CPT | Mod: GP

## 2023-01-11 PROCEDURE — 250N000012 HC RX MED GY IP 250 OP 636 PS 637: Performed by: HOSPITALIST

## 2023-01-11 PROCEDURE — 84100 ASSAY OF PHOSPHORUS: CPT | Performed by: HOSPITALIST

## 2023-01-11 PROCEDURE — 99207 PR CDG-CUT & PASTE-POTENTIAL IMPACT ON LEVEL: CPT | Performed by: HOSPITALIST

## 2023-01-11 PROCEDURE — 99231 SBSQ HOSP IP/OBS SF/LOW 25: CPT | Performed by: PHYSICIAN ASSISTANT

## 2023-01-11 PROCEDURE — 94640 AIRWAY INHALATION TREATMENT: CPT | Mod: 76

## 2023-01-11 PROCEDURE — 250N000011 HC RX IP 250 OP 636: Performed by: NURSE PRACTITIONER

## 2023-01-11 PROCEDURE — 97110 THERAPEUTIC EXERCISES: CPT | Mod: GO | Performed by: OCCUPATIONAL THERAPIST

## 2023-01-11 PROCEDURE — 94640 AIRWAY INHALATION TREATMENT: CPT

## 2023-01-11 PROCEDURE — 84155 ASSAY OF PROTEIN SERUM: CPT | Performed by: NURSE PRACTITIONER

## 2023-01-11 PROCEDURE — 83615 LACTATE (LD) (LDH) ENZYME: CPT | Performed by: NURSE PRACTITIONER

## 2023-01-11 PROCEDURE — 90935 HEMODIALYSIS ONE EVALUATION: CPT

## 2023-01-11 PROCEDURE — 250N000011 HC RX IP 250 OP 636: Performed by: HOSPITALIST

## 2023-01-11 PROCEDURE — 250N000009 HC RX 250: Performed by: HOSPITALIST

## 2023-01-11 RX ADMIN — ACETAMINOPHEN 650 MG: 650 SOLUTION ORAL at 09:51

## 2023-01-11 RX ADMIN — ACETYLCYSTEINE 2 ML: 200 SOLUTION ORAL; RESPIRATORY (INHALATION) at 12:31

## 2023-01-11 RX ADMIN — QUETIAPINE 75 MG: 25 TABLET ORAL at 20:58

## 2023-01-11 RX ADMIN — TACROLIMUS 1 MG: 5 CAPSULE ORAL at 08:39

## 2023-01-11 RX ADMIN — FOLIC ACID 1 MG: 1 TABLET ORAL at 08:40

## 2023-01-11 RX ADMIN — CHLORHEXIDINE GLUCONATE 0.12% ORAL RINSE 15 ML: 1.2 LIQUID ORAL at 08:38

## 2023-01-11 RX ADMIN — ALTEPLASE 2 MG: 2.2 INJECTION, POWDER, LYOPHILIZED, FOR SOLUTION INTRAVENOUS at 23:00

## 2023-01-11 RX ADMIN — HEPARIN SODIUM 1900 UNITS: 1000 INJECTION INTRAVENOUS; SUBCUTANEOUS at 20:25

## 2023-01-11 RX ADMIN — LACOSAMIDE 100 MG: 10 SOLUTION ORAL at 20:58

## 2023-01-11 RX ADMIN — RIFAXIMIN 550 MG: 550 TABLET ORAL at 08:40

## 2023-01-11 RX ADMIN — ACETAMINOPHEN 650 MG: 650 SOLUTION ORAL at 00:22

## 2023-01-11 RX ADMIN — TACROLIMUS 1 MG: 5 CAPSULE ORAL at 17:28

## 2023-01-11 RX ADMIN — ACETAMINOPHEN 650 MG: 650 SOLUTION ORAL at 18:34

## 2023-01-11 RX ADMIN — ALBUTEROL SULFATE 2.5 MG: 2.5 SOLUTION RESPIRATORY (INHALATION) at 19:13

## 2023-01-11 RX ADMIN — NYSTATIN 500000 UNITS: 100000 SUSPENSION ORAL at 08:38

## 2023-01-11 RX ADMIN — ALBUTEROL SULFATE 2.5 MG: 2.5 SOLUTION RESPIRATORY (INHALATION) at 15:37

## 2023-01-11 RX ADMIN — ACETAMINOPHEN 975 MG: 325 TABLET, FILM COATED ORAL at 21:00

## 2023-01-11 RX ADMIN — Medication 6 MG: at 21:01

## 2023-01-11 RX ADMIN — NYSTATIN 500000 UNITS: 100000 SUSPENSION ORAL at 12:48

## 2023-01-11 RX ADMIN — LACOSAMIDE 100 MG: 10 SOLUTION ORAL at 08:40

## 2023-01-11 RX ADMIN — Medication 1 TABLET: at 08:39

## 2023-01-11 RX ADMIN — Medication 1 DROP: at 20:58

## 2023-01-11 RX ADMIN — ALBUTEROL SULFATE 2.5 MG: 2.5 SOLUTION RESPIRATORY (INHALATION) at 07:45

## 2023-01-11 RX ADMIN — MIDODRINE HYDROCHLORIDE 5 MG: 5 TABLET ORAL at 12:47

## 2023-01-11 RX ADMIN — ACETYLCYSTEINE 2 ML: 200 SOLUTION ORAL; RESPIRATORY (INHALATION) at 19:14

## 2023-01-11 RX ADMIN — INSULIN GLARGINE 20 UNITS: 100 INJECTION, SOLUTION SUBCUTANEOUS at 08:40

## 2023-01-11 RX ADMIN — ZOLPIDEM TARTRATE 5 MG: 5 TABLET ORAL at 02:56

## 2023-01-11 RX ADMIN — NYSTATIN 500000 UNITS: 100000 SUSPENSION ORAL at 17:27

## 2023-01-11 RX ADMIN — AMOXICILLIN AND CLAVULANATE POTASSIUM 500 MG: 200; 28.5 POWDER, FOR SUSPENSION ORAL at 09:56

## 2023-01-11 RX ADMIN — Medication 1 DROP: at 08:39

## 2023-01-11 RX ADMIN — ALBUTEROL SULFATE 2.5 MG: 2.5 SOLUTION RESPIRATORY (INHALATION) at 12:31

## 2023-01-11 RX ADMIN — LACTULOSE 20 G: 20 SOLUTION ORAL at 20:59

## 2023-01-11 RX ADMIN — Medication 12.5 MG: at 00:22

## 2023-01-11 RX ADMIN — FLUCONAZOLE 200 MG: 200 TABLET ORAL at 20:58

## 2023-01-11 RX ADMIN — MIDODRINE HYDROCHLORIDE 5 MG: 5 TABLET ORAL at 08:39

## 2023-01-11 RX ADMIN — CHLORHEXIDINE GLUCONATE 0.12% ORAL RINSE 15 ML: 1.2 LIQUID ORAL at 20:59

## 2023-01-11 RX ADMIN — ACETYLCYSTEINE 2 ML: 200 SOLUTION ORAL; RESPIRATORY (INHALATION) at 15:38

## 2023-01-11 RX ADMIN — Medication 40 MG: at 20:59

## 2023-01-11 RX ADMIN — WHITE PETROLATUM: 1.75 OINTMENT TOPICAL at 08:40

## 2023-01-11 RX ADMIN — RIFAXIMIN 550 MG: 550 TABLET ORAL at 20:58

## 2023-01-11 RX ADMIN — ACETAMINOPHEN 975 MG: 325 TABLET, FILM COATED ORAL at 06:19

## 2023-01-11 RX ADMIN — NYSTATIN 500000 UNITS: 100000 SUSPENSION ORAL at 20:59

## 2023-01-11 RX ADMIN — ACETYLCYSTEINE 2 ML: 200 SOLUTION ORAL; RESPIRATORY (INHALATION) at 07:45

## 2023-01-11 RX ADMIN — Medication 1 DROP: at 14:15

## 2023-01-11 RX ADMIN — Medication 40 MG: at 08:39

## 2023-01-11 RX ADMIN — MICONAZOLE NITRATE ANTIFUNGAL POWDER: 2 POWDER TOPICAL at 10:00

## 2023-01-11 RX ADMIN — LACTULOSE 20 G: 20 SOLUTION ORAL at 08:38

## 2023-01-11 RX ADMIN — ACETAMINOPHEN 975 MG: 325 TABLET, FILM COATED ORAL at 14:08

## 2023-01-11 RX ADMIN — LEVETIRACETAM 1000 MG: 100 SOLUTION ORAL at 20:58

## 2023-01-11 ASSESSMENT — ACTIVITIES OF DAILY LIVING (ADL)
ADLS_ACUITY_SCORE: 60
ADLS_ACUITY_SCORE: 62
ADLS_ACUITY_SCORE: 62
ADLS_ACUITY_SCORE: 60
ADLS_ACUITY_SCORE: 62
ADLS_ACUITY_SCORE: 60
ADLS_ACUITY_SCORE: 62

## 2023-01-11 NOTE — PROGRESS NOTES
"Speech pathology: Clinical swallow evaluation and progress note   01/11/23 1051   Appointment Info   Signing Clinician's Name / Credentials (SLP) Matti Santos MA, [facility]-SLP   Rehab Comments (SLP) Patient up in chair, speaking valve in place.   General Information   Onset of Illness/Injury or Date of Surgery 12/16/22   Referring Physician Narinder   Patient/Family Therapy Goal Statement (SLP) To eat and drink   Pertinent History of Current Problem Per pulmonary note, dated 12/29/22: \"57yoM with liver transplant(2016), recent prolonged hospitalization 11/12-12/15 for PEA cardiac arrest, Strep/Parainfluenza pneumonia with ARDS, MODS, right PTX requiring chest tube since removed.  Now on HD, s/p trach/PEG.  Discharged to LTAC and sent out the next day with new left PTX & GIB.  Recent hospitalization c/b GIB, PEA arrest and seizure on 12/20.  Left CT removed on 12/16.  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  Transferred back to LTAC 12/29.\"   General Observations Patient alert and cooperative   Type of Evaluation   Type of Evaluation Swallow Evaluation   Tracheostomy Assessment (Speaking Valve)   Comment, Tracheostomy (Speaking Valve) #7 Bivona with speaking valve in place   Oral Motor   Oral Musculature generally intact   Structural Abnormalities none present   Mucosal Quality adequate   Dentition (Oral Motor)   Dentition (Oral Motor) natural dentition   Facial Symmetry (Oral Motor)   Facial Symmetry (Oral Motor) WNL   Lip Function (Oral Motor)   Lip Range of Motion (Oral Motor) WNL   Tongue Function (Oral Motor)   Tongue ROM (Oral Motor) WNL   Tongue Strength (Oral Motor) WNL   Tongue Coordination/Speed (Oral Motor) WNL   Cough/Swallow/Gag Reflex (Oral Motor)   Volitional Swallow (Oral Motor) effortful   Vocal Quality/Secretion Management (Oral Motor)   Vocal Quality (Oral Motor) " hoarse   General Swallowing Observations   Current Diet/Method of Nutritional Intake (General Swallowing Observations, NIS) NPO;gastrostomy tube (PEG)   Past History of Dysphagia Blue dye test completed on 12/30/2022 and 1/10/2023 with no immediate aspiration of secretions but positive delayed aspiration of secretions noted.   Swallowing Evaluation Clinical swallow evaluation   Clinical Swallow Evaluation   Feeding Assistance dependent   Clinical Swallow Evaluation Textures Trialed thin liquids;moderately thick liquids/liquidized;pureed   Clinical Swallow Eval: Thin Liquid Texture Trial   Mode of Presentation, Thin Liquids spoon   Volume of Liquid or Food Presented 3 sips   Oral Phase of Swallow WFL   Pharyngeal Phase of Swallow reduction in laryngeal movement;coughing/choking;other (see comments)  (Delayed)   Successful Strategies Trialed During Procedure chin tuck   Diagnostic Statement Chin tuck was not successful in reducing s/s aspiration   Clinical Swallow Eval: Moderately Thick Liquids   Mode of Presentation spoon   Volume Presented 3 sips   Oral Phase WFL   Pharyngeal Phase coughing/choking;throat clearing   Clinical Swallow Evaluation: Puree Solid Texture Trial   Mode of Presentation, Puree spoon   Volume of Puree Presented 3 bites   Pharyngeal Phase, Puree repeated swallows;feeling of something stuck in throat   Swallowing Recommendations   Diet Consistency Recommendations NPO   Instrumental Assessment Recommendations VFSS (videofluoroscopic swallowing study)   General Therapy Interventions   Planned Therapy Interventions Dysphagia Treatment   Dysphagia treatment Instruction of safe swallow strategies;Compensatory strategies for swallowing   Clinical Impression   Criteria for Skilled Therapeutic Interventions Met (SLP Eval) Yes, treatment indicated   SLP Diagnosis Dysphagia   Risks & Benefits of therapy have been explained care plan/treatment goals reviewed;evaluation/treatment results  reviewed;participants voiced agreement with care plan;participants included;patient   Clinical Impression Comments Patient presents with inconsistent signs symptoms of aspiration with multiple consistencies during clinical swallow evaluation.  Given aspiration of secretions noted on previous blue dye test, initiation of diet would not be appropriate at this time until video swallow study can be completed for more objective evaluation of swallow function.   Swallowing Dysfunction &/or Oral Function for Feeding   Treatment of Swallowing Dysfunction &/or Oral Function for Feeding Minutes (10008) 18   Treatment Detail/Skilled Intervention See above for clinical swallow evaluation results   Speech, Language, Voice Communication&/or Auditory Processing   Treatment of Speech, Language, Voice Communication&/or Auditory Processing Minutes (34324) 15   Treatment Detail/Skilled Intervention Cognitive-linguistic: to address impairment in completing linguistically-based problem solving, reasoning, judgment, attention, memory, and executive function for baseline activities. Orientation- Person: Patient was oriented to self and hometown but incompletely to address.  Unable to orient to current .  Improved when given first name. Orientation-place: Patient was oriented to state and city (with self correction), as well as building type.  Needed moderate cues to reorient to building name.  Orientation-time: Patient was oriented to year but not month.  Needed mod cues to adequately orient.  Not oriented to approximate time of month.  And needed moderate cues to reorient. Orientation-situation: Patient was not oriented to her reason for hospitalization.  Needed moderate prompts and cues.   SLP Discharge Planning   SLP Plan Video swallow study   SLP Discharge Recommendation Transitional Care Facility   SLP Rationale for DC Rec Communication, cognition, and swallow function are below baseline   SLP Brief  overview of current status  Continue n.p.o. due to inconsistent signs and symptoms of aspiration and history of  aspiration of secretions.  Continues to be confused and disoriented.  Voicing stronger today, dysphonic more than aphonic.   Total Session Time   Total Session Time (sum of timed and untimed services) 33

## 2023-01-11 NOTE — PLAN OF CARE
Problem: Mechanical Ventilation Invasive  Goal: Optimal Device Function  Outcome: Progressing  Goal: Mechanical Ventilation Liberation  Outcome: Progressing  Goal: Absence of Device-Related Skin and Tissue Injury  Outcome: Progressing  Goal: Absence of Ventilator-Induced Lung Injury  Outcome: Progressing   RT PROGRESS NOTE     DATA:     CURRENT SETTINGS: AC 12, 450, +5             TRACH TYPE / SIZE:  6 Shiley  XLT  PROX 11/29/2022             MODE: TM with cuff up             FIO2:  30%/30L     ACTION:             THERAPIES: Duo/Muco QID              SUCTION:                           FREQUENCY: X1                        AMOUNT: Small                        CONSISTENCY: Thick                        COLOR: White              SPONTANEOUS COUGH EFFORT/STRENGTH OF EFFORT (not elicited by suctioning):Strong                              WEANING PHASE:  2                         WEAN MODE: 30%/30L TM                        WEAN TIME:  Since yesterday                        END WEAN REASON: On going     RESPONSE:             BS:  Coarse             VITAL SIGNS:Sat %, -114, RR 22-24             EMOTIONAL NEEDS / CONCERNS:  no              RISK FOR SELF DECANNULATION:                          RISK DUE TO:                          INTERVENTION/S IN PLACE IS/ARE:      NOTE/PLAN: Cont with plan. VBG's in the morning.

## 2023-01-11 NOTE — PLAN OF CARE
Problem: Pain Acute  Goal: Optimal Pain Control and Function  Outcome: Progressing  Intervention: Develop Pain Management Plan  Recent Flowsheet Documentation  Taken 1/11/2023 1009 by Inna Guzman RN  Pain Management Interventions: (tylenol 650 mg at 0951) medication (see MAR)     Problem: Enteral Nutrition  Goal: Absence of Aspiration Signs and Symptoms  Outcome: Progressing  Goal: Safe, Effective Therapy Delivery  Outcome: Progressing  Goal: Feeding Tolerance  Outcome: Progressing     Problem: Artificial Airway  Goal: Effective Communication  Outcome: Progressing   Goal Outcome Evaluation:  Patient reported a 8/10 bilateral abdominal pain (tylenol 650 mg) was given at 0951 (pain was done to 6/10 with initial assessment), denied pain at this time. Patient has been more alert this shift, calm and cooperative with cares, midodrine given x 2 this shift (blood pressure was 103/65 at 0735, 99/68 at 0836, 109/67 at 1245, tolerating tube feeding okay so far. Patient will have paracentesis in am, continue to be on cardiac monitoring (rhythm was sinus tachy at 0804and NSR at 1200), blood culture preliminary result yielded no growth so far after 12 hrs, will have dialysis today in the evening, phosphorus result is 3.1, will recheck level in am per protocol. Patient was up in chair in the morning, was put back in chair again at 1400 per spouse's request-complained of dizziness while up in chair the second time-blood pressure was 109/67 at this time (1428)

## 2023-01-11 NOTE — PROGRESS NOTES
Renal progress note  CC: LORETTA likely progressed to ESRD  Assessment and Plan:  57 YOM EtOH cirrhosis , SP liver tx 2016, on IHD for LORETTA likely progressed to ESRD since 11/29/2022, recent prolonged hosp for PEA arrest 11/15/2022 to 12/15/2022 , cb influenza and bact PNA, sp Trach and PEG and had a second PEA arrest 12/20 with seizures , cb pleural effusions (fungal infection), and recurrent ascites , CSF + for HHV 6, now at [facility] , nephrology following for ESRD management.    Anuric LORETTA likely ESRD now  requiring dialysis: After PEA arrest, was on CRRT which was discontinued 11/26/2022 and now started on intermittent hemodialysis ongoing since 11/29/2022.  He is maintained on a MWF schedule.  No signs of renal recovery yet.    -Attempted diuretic challenge and bladder scans->remains anuric and torsemide was discontinued 1/2  -Aim to keep net negative.  Target UF 2-3kg as BP allows  -Midodrine to support blood pressure and UF.   -MWF HD at [facility]     Anemia of chronic renal failure:   Hematochezia   Left hemothorax.    Erythropoietin -> Dose escalated to 40K weekly   Previously low iron, holding off replacement given infectious concerns as below   Transfusing per hospitalist service      Chronic hypotension: Likely due to chronic liver disease, improving.  on midodrine to 5mg TID with parameters to hold for SBP>110.  may need to increase if hemodynamics do not support UF  Will hold off albumin for now     Hypokalemia: Needing intermittent replacement.    He is higher K bath with HD.     Acute on chronic hypoxic respiratory failure: S/p tracheostomy 11/29/2022.  Complicated by left hemopneumothorax s/p left chest tube.  History of aspergillus PNA and multiple bacterial PNA. On micafungin.  Mgmt per pulm      CARRILLO cirrhosis s/p liver transplant: Immunosuppression per GI.  Tacrolimus and prednisone.  Paracentesis as indicated per GI. On Abx for lactobacillus peritonitis.      HHV-6 meningeal  encephalitis:  Seizures:  LP showing HHV6 12/21/22.  Initial seizure after PEA arrest, then another seizure again 12/21/2022 during dialysis session.  Initially on acyclovir and then switched over to ganciclovir, since been discontinued.  On Keppra and Vimpat.  Management per neurology and ID    Thank you for the consultation we will follow  Jyotsna Gamble PA-C   [facility]  [phone]      Subjective  Seen at bedside, wife present and reports Blanco very tired today and wanting to sleep. Long discussion today about renal function. Plans for paracentesis/thoracentesis tomorrow at [facility]     Objective    Vital signs in last 24 hours  Temp:  [97.6  F (36.4  C)-99.3  F (37.4  C)] 97.6  F (36.4  C)  Pulse:  [102-114] 104  Resp:  [20-24] 20  BP: ()/(54-91) 99/68  FiO2 (%):  [30 %] 30 %  SpO2:  [95 %-100 %] 99 %  Weight:   [unfilled]    Intake/Output last 3 shifts  I/O last 3 completed shifts:  In: 1670 [NG/GT:1670]  Out: 1200 [Stool:1200]  Intake/Output this shift:  I/O this shift:  In: 150 [NG/GT:150]  Out: -     Physical Exam  Asleep   CV: RRR without murmur or rub  Lung: clear and equal; no extra sounds  Ab: + distended, minimal tension   Ext: no edema and well perfused  Skin; no rash    Pertinent Labs   Lab Results   Component Value Date    WBC 4.8 01/10/2023    HGB 7.1 (L) 01/11/2023    HCT 24.2 (L) 01/10/2023     (H) 01/10/2023     (L) 01/10/2023     Lab Results   Component Value Date    BUN 50.5 (H) 01/10/2023     01/11/2023    CO2 28 01/10/2023       Lab Results   Component Value Date    ALBUMIN 2.0 (L) 01/10/2023     Lab Results   Component Value Date    PHOS 3.1 01/11/2023     I reviewed all lab results  Jyotsna Gamble PA-C

## 2023-01-11 NOTE — PLAN OF CARE
Problem: Mechanical Ventilation Invasive  Goal: Optimal Device Function  Outcome: Progressing  Goal: Mechanical Ventilation Liberation  Outcome: Progressing  Goal: Absence of Device-Related Skin and Tissue Injury  Outcome: Progressing  Goal: Absence of Ventilator-Induced Lung Injury  Outcome: Progressing   RT PROGRESS NOTE     DATA:     CURRENT SETTINGS: AC 12, 450, +5, 25% @Night             TRACH TYPE / SIZE: #7BIVONA TTS Changed on 01/09/2023             MODE: AC             FIO2:  25%     ACTION:             THERAPIES: Albuterol /Mucomyst Neb QID              SUCTION:                           FREQUENCY: X7                        AMOUNT: Small to large                        CONSISTENCY: thick/thick                        COLOR: white/ pale yellow              SPONTANEOUS COUGH EFFORT/STRENGTH OF EFFORT (not elicited by suctioning):Strong                              WEANING PHASE:2                        WEAN MODE:30%30 L/MINTM/PMV day. TM/cuff up night,                        WEAN TIME:Since 08:35                         END WEAN REASON:Ongoing -TM wean     RESPONSE:             BS:Clear Decreased              VITAL SIGNS:Sat  %,-112,,RR 22-28             EMOTIONAL NEEDS / CONCERNS: no              RISK FOR SELF DECANNULATION:                          RISK DUE TO:                          INTERVENTION/S IN PLACE IS/ARE:     NOTE / PLAN: Cont . Current plan of care/: TM/PMV day. TM/cuff up night, Patient wore PMV for 11hrs -tolerated , Patient had BDT = no Immediate but light -blue  Delayed aspiration , Patient to cont. Wean on TM thru the night (off vent) and VBG in AM 1/11/23

## 2023-01-11 NOTE — PROGRESS NOTES
[facility]    Medicine Progress Note - Hospitalist Service    Date of Admission:  12/29/2022  Brief history:  Blanco Osborne is an 57 year old male who is transferred from [facility] f to [facility] for  IV antibiotic treatment.  Patient has multiple medical problems including history of liver transplant 2016, PAF on chronic anticoagulation, history of DM2, CVA, HTN, CHRISTIAN on BiPAP, and history of alcohol abuse in the past causing liver damage ending with liver transplant.  About 6 weeks ago he had respiratory arrest and was diagnosed with infection with parainfluenza and strep pneumoniae and acute on chronic renal insufficiency ending with CRF needing 3/week hemodialysis.  During this period he was diagnosed with cirrhosis of transplanted liver and hyperammonia.  Currently he is on Lactulose and Rifaximin.        On 12/14/22, due to CIP, he was transferred to [facility] for the first time with Trach, PEG and Rt chest tube.    On 12/16/2022 patient was transferred back to [facility] due to respiratory insufficiency secondary to hydropneumothorax and drainage of 900 cc bloody pleural effusion, bloody stool and transfusion of 2 units PRBC.      On 12/20/2022 he had another episode of PEA arrest followed by CPR x2 minutes and possibly had seizure activity in [facility]. 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  He has been on ventilator and has improved to trach dome tolerating up to 6 hours so far. He had chest tube which was removed.    He had SBP with growth of lactobacillus and is currently on Unasyn which will be switched to Augmentin orally through 1/12/2023.    Pleural effusion grew Candida and is on micafungin.  He is also on  due to cirrhosis and hyperammonia.  He is anemic and is on Retacrit.   He is on lacosamide and Keppra due to history of seizure.  There is question of him drinking again  causing him cirrhosis of the transplanted liver.  LTAC course:  1/10/2023.  I attended patient care conference earlier today.  Worsening ascites compared to previous days.  Plan for paracentesis per IR tomorrow  1/11/2023.  Dialyzing today.  Plan for paracentesis tomorrow.  Otherwise continue current medical management.  No new changes at this time.    Assessment & Plan   Principal Problem:    Acute on chronic respiratory failure with hypoxia (H)  Active Problems:    Acquired immunocompromised state (H)    Cirrhosis of liver (H)    NAFLD (nonalcoholic fatty liver disease)    Liver transplanted (H)    Immunosuppression (H)    Acute kidney injury (H)    Spontaneous bacterial peritonitis (H)    Critical illness myopathy    History of sudden cardiac arrest, PEA    Dependent on hemodialysis (H)    Acute now chronic respiratory failure requiring tracheostomy..  Initial event was parainfluenza and strep pneumo pneumonia.  Had failed extubation x2 and tracheostomy performed last hospitalization.  Had additional complications of bilateral pneumothoraces.  (Most recently was a hydropneumothorax where fluid grew Candida.. Chest tube was placed and removed . Had decent tolerance for pressure support and trach dome but after few hours and trach dome 12/27 had a pretty significant episode of desaturation.   Plan:  -Wean ventilator per pulmonology.  -Good pulmonary toilet  -Tracheostomy care per RT    History PEA  Hypotension  PEA arrest prior to his previous admission and 1 episode of PEA associated with desaturation on 12/20.  No structural cardiac disease but does have A. Fib which neurologist thought might have been contributory to his embolic strokes.  Anticoagulation been restarted with stable labs although high risk for bleeding seconday to thrombocytopenia.  Also has developed baseline hypotension and is on midodrine 10 mg 3 times daily.  Plan:  Continue Eliquis.    Continue current midodrine dose     Seizure after hypoxic  event.  This is in the context of baseline multifactorial encephalopathy secondary to his ongoing critical illness and prior cardiac arrests and prior strokes and cirrhosis with hepatic encephalopathy. MRI of head of 12/20/22 reveals no PRES or leptomeningeal enhancement but scattered.  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   Plan:  - Continue with  Keppra  indefinitely.  Neurology follow-up.   - Anticoagulation indefinitely for secondary stroke prevention.  On hold since 1/6/2023 due to tracheal bleeding during suction.  Patient is currently in sinus rhythm.  And telemetry and monitored.  - Tylenol TID for headache.        ESRD: Now on iHD.  No return of renal function.  - MWF schedule      Infectious disease  1. 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 4.  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  Have had some conversations within our group and with transplant ID and general infectious disease.  General consensus is that ganciclovir probably could be discontinued  2. H/o Strep/Parainfluenza infection last hospitalization. Completed treatment course  3. 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  4.  Lactobacillus growing in the broth 4 days after paracentesis.  Cell count was very low.  5.  Candida in left pleural fluid cultures.  Sensitivities not resulted.  6.  Immunosuppressed on tacrolimus.  Discussed with transplant service at the [place] who felt that given the absence of rejection on his most recent biopsy and ongoing issues with infection continuing with 1 twice daily tacrolimus is the best current option.  They have seen his subtherapeutic trough levels.  Plan:  1.  Confirm with transplant ID whether or not we can stop ganciclovir.  2.  Unasyn for 2 weeks total.  Change to Augmentin through 1/12/2023.  3.  At least 4 weeks of antimicrobial for the Candida.  If sensitive to fluconazole could de-escalate from micafungin.  4.  Continue tacrolimus as above and we are  tapering steroids    Post transplant cirrhosis.  Main manifestations of this are hepatic encephalopathy and ascites.  Hepatologist at [place] felt that most likely reason for the cirrhosis was metabolic syndrome or alcohol intake.  There was no evidence of rejection on biopsy and there was some evidence of regeneration. Paracentesis done on 12/22/2022 with lack of bacillus in the broth indicating SBP low cell count was very low  Plan:  1.  Intermittent paracentesis as needed.  2.  Tacrolimus 1 mg BID and tapering steroids   3.  Lactulose and Rifaximin as above  4.  Treat SBP for 2 weeks.  5.  He has an appointment in April with Dr. Simms if he is out of the hospital.    Diabetes mellitus type 2  1. DM.  Titrating insulin. He needs supplemental coverage.  2. He is on steroids which we are weaning  Plan:  -Continue with current regimen.   -Hypoglycemic protocol in place    Moderate malnutrition, protein and calorie type.  -Continue with tube feeds via PEG tube  -Nutritionist consulted and following      Pancytopenia due cirrhosis, ESRD and hypersplenism. WBC count has improved.  However he needs frequent PRBC transfusion due Hb <7.0.   Patient is on anticoagulation for PAF.  Currently in sinus rhythm.  Is off anticoagulation due to tracheal bleeding.  Plan:  1.  Eliquis is on hold for now.     Code Status: Full     Diet: Adult Formula Drip Feeding: Continuous Novasource Renal; Gastrostomy; Goal Rate: 60; mL/hr    DVT Prophylaxis: DOAC  Nixon Catheter: Not present  Central Lines: PRESENT      Cardiac Monitoring: ACTIVE order. Indication: Tachyarrhythmias, acute (48 hours)  Code Status: Full Code      Disposition Plan      Expected Discharge Date: 01/25/2023    Discharge Delays: Complex Discharge    Discharge Comments: Vent. Trach. Phase 1. PEG.  Rectal Tube.      The patient's care was discussed with the Bedside Nurse, Patient and Patient's Family.    Yahir Barcenas MD  Hospitalist Service  LT[facility]  Securely message  with the [employer] Web Console (learn more here)  Text page via [employer] Paging/Directory     Clinically Significant Risk Factors        # Hypokalemia: Lowest K = 3.3 mmol/L in last 2 days, will replace as needed   # Hypocalcemia: Lowest iCa = 1.2 mg/dL in last 2 days, will monitor and replace as appropriate  # Hypercalcemia: corrected calcium is >10.1, will monitor as appropriate    # Hypoalbuminemia: Lowest albumin = 1.8 g/dL at 12/29/2022  4:40 AM, will monitor as appropriate             # Severe Malnutrition: based on nutrition assessment      _____________________________________________________________________    Interval History   No new events overnight per RN.  Patient resting comfortably he reports he feels okay.  Reports no new complaints at this time.    Data reviewed today: I reviewed all medications, new labs and imaging results over the last 24 hours. I personally reviewed no images or EKG's today.    Physical Exam   Vital Signs: Temp: 98.1  F (36.7  C) Temp src: Oral BP: 109/67 Pulse: 102   Resp: (P) 22 SpO2: (P) 95 % O2 Device: (P) Trach dome Oxygen Delivery: (P) 30 LPM  Weight: 201 lbs 8 oz   General: Patient is lying in bed comfortably, he is awake alert oriented to person  HEENT: Head appears normocephalic atraumatic, eyes pupils appear equal round and reactive to light  Neck: Viable trach noted  Lungs: He has a normal respiratory effort, good air entry is noted no obvious wheezing noted  Heart: He has a good S1 and S2 no obvious murmurs, peripheral pulses are palpable bilaterally  Abdomen: Soft, distended, ascites noted, bowel sounds noted  Musculoskeletal: Good muscle tone, PICC line in right brachial artery, nails and digits appear acyanotic I did not examine his range of motion  Skin: No obvious rashes on inspection, warm to touch, please refer to nursing/wound care note for complete skin exam    Data   Recent Labs   Lab 01/11/23  0637 01/11/23  0635 01/11/23  0610 01/11/23  0443 01/10/23  0608  01/09/23  1824 01/09/23  0656   WBC  --   --   --   --  4.8  --   --    HGB  --  7.1*  --  7.4* 7.2*   < > 7.3*   MCV  --   --   --   --  102*  --   --    PLT  --   --   --   --  131*  --   --    NA  --  137  --  137 135*   < > 134*   POTASSIUM  --  3.5  --  3.5 3.9   < > 3.7   CHLORIDE  --   --   --   --  96*  --  94*   CO2  --   --   --   --  28  --  27   BUN  --   --   --   --  50.5*  --  76.0*   CR  --   --   --   --  2.28*  --  3.14*   ANIONGAP  --   --   --   --  11  --  13   KELLY  --   --   --   --  8.9  --  9.2   GLC  --  151* 158* 152* 131*   < > 159*   ALBUMIN  --   --   --   --  2.0*  --   --    PROTTOTAL 6.3*  --   --   --  6.4  --   --    BILITOTAL  --   --   --   --  0.5  --   --    ALKPHOS  --   --   --   --  361*  --   --    ALT  --   --   --   --  11  --   --    AST  --   --   --   --  24  --   --     < > = values in this interval not displayed.     Medications     dextrose       - MEDICATION INSTRUCTIONS -         sodium chloride 0.9%  300 mL Hemodialysis Machine During Dialysis/CRRT (from stock)     acetaminophen  975 mg Per Feeding Tube Q8H Replaced by [facility]    Or     acetaminophen  650 mg Rectal Q8H BIJU     acetylcysteine  2 mL Nebulization 4x daily     albuterol  2.5 mg Nebulization 4x daily     [Held by provider] apixaban ANTICOAGULANT  5 mg Oral or Feeding Tube BID     carboxymethylcellulose PF  1 drop Both Eyes TID     chlorhexidine  15 mL Mouth/Throat BID     epoetin mirta-epbx  40,000 Units Subcutaneous Weekly     fluconazole  200 mg Per Feeding Tube At Bedtime     folic acid  1 mg Oral or Feeding Tube Daily     heparin Lock (1000 units/mL High concentration)  1,900 Units Intracatheter During Dialysis/CRRT (from stock)     heparin Lock (1000 units/mL High concentration)  1,900 Units Intracatheter During Dialysis/CRRT (from stock)     insulin aspart  1-6 Units Subcutaneous Daily     insulin glargine  20 Units Subcutaneous Daily     lacosamide  100 mg Oral or Feeding Tube BID     lactulose  20 g Oral or  Feeding Tube BID     levETIRAcetam  1,000 mg Oral or Feeding Tube Q24H     Melatonin  6 mg Oral or NG Tube At Bedtime     midodrine  10 mg Oral During Dialysis/CRRT (from stock)     midodrine  5 mg Oral or Feeding Tube TID w/meals     mineral oil-hydrophilic petrolatum   Topical Daily     multivitamin RENAL  1 tablet Per Feeding Tube Daily     nystatin  500,000 Units Swish & Swallow 4x Daily     pantoprazole  40 mg Per Feeding Tube BID     QUEtiapine  75 mg Oral or Feeding Tube At Bedtime     rifaximin  550 mg Per Feeding Tube BID     sodium chloride (PF)  10-30 mL Intracatheter Q8H     tacrolimus  1 mg Oral or Feeding Tube BID   In speaking valve

## 2023-01-11 NOTE — PLAN OF CARE
Problem: Pain Acute  Goal: Optimal Pain Control and Function  Outcome: Progressing  Intervention: Develop Pain Management Plan  Recent Flowsheet Documentation  Taken 1/11/2023 0022 by Keyla Adorno RN  Pain Management Interventions: medication (see MAR)  Intervention: Prevent or Manage Pain  Recent Flowsheet Documentation  Taken 1/11/2023 0022 by Keyla Adorno RN  Medication Review/Management: medications reviewed   Goal Outcome Evaluation:  Patient vital sign was stable except HR that  Was high and fluctuates.  pt continued on tele monitor which read sinus tachy, sepsis fired @ 0430 was drawned and result read 1.4, vital sign was completed per protocol, patient occasionally pulled on his tubes but leaves them when redirected.  prn Tylenol , Seroquel and Ambien was given and all were helpful, patient slept for 3- 4 hours, was repositioned every 2 hours.

## 2023-01-11 NOTE — PLAN OF CARE
Problem: Mechanical Ventilation Invasive  Goal: Effective Communication  Intervention: Ensure Effective Communication  Recent Flowsheet Documentation  Taken 1/10/2023 1756 by Dee Toscano RN  Family/Support System Care: presence promoted  Trust Relationship/Rapport: care explained     Problem: Plan of Care - These are the overarching goals to be used throughout the patient stay.    Goal: Optimal Comfort and Wellbeing  Outcome: Progressing  Intervention: Provide Person-Centered Care  Recent Flowsheet Documentation  Taken 1/10/2023 1756 by Dee Toscano RN  Trust Relationship/Rapport: care explained   Goal Outcome Evaluation:  Patient was calm and cooperative with cares and tolerated repositions and transfers well. Patient remained tachycardiac  telemetry continued. Had speaking valve during the day was able to verbalize needs clearly. Patient denied pain.

## 2023-01-11 NOTE — PROGRESS NOTES
Pulmonary Progress Note    Admit Date: 12/29/2022  CODE: Full Code    Assessment/Plan:   57yoM with liver transplant(2016), recent prolonged hospitalization 11/12-12/15 for PEA cardiac arrest, Strep/Parainfluenza pneumonia with ARDS, MODS, ?aspergillus pna s/p 1 week of voriconazole, right PTX requiring chest tube since removed.  Now HD dependent, s/p trach/PEG.  Course further c/b new left PTX requiring chest tube(since removed), b/l pleural effusions, ascites, mucus plugging, PEA arrest and seizure on 12/20, pleural fluid cxs growing candida parapsilosis & ascites cx growing lactobacillus on antimicrobials, and CSF panel positive for HHV6 ultimately decided to not treat.  Admitted to [facility] 12/29.     Discussion of pertinent problems:  Acute hypoxic/hypercapnic respiratory failure s/p trach: Progressing slowly with trach dome trials and stayed off the vent overnight with acceptable VBG this morning(7.41/50)    Hx bilateral pleural effusions s/p multiple thoracentesis: moderate right effusion on recent imaging     Tracheostomy in place: 8 Shiley placed 11/29.  Changed to 6 Shiley prox xlt 12/8, unclear why.  Downsized to 7 Bivona 1/9 without issue.      Dysphagia s/p PEG tube.  BDT 12/30 with delayed aspiration.  Tolerating PMV trials well after trach downsize.  Repeat BDT 1/10 with no immediate and faint delayed aspiration.  Bedside swallow eval today shows s/s aspiration.       Candida in left pleural fluid cultures: started on Micafungin switched to fluconazole 1/3 based on cx sensitivities.    - Cont fluconazole for ~4 week total course based on imaging; tentative end date ~1/23    [facility] on MWF HD: followed by Nephrology     S/p Liver transplant with post transplant cirrhosis & ascites s/p paracentesis 12/22: on [place] and prednisone    CHRISTIAN on home BiPAP.  Managed currently with trach and vent     lactobacillus peritonitis started on Unasyn transitioned to PO Augmentin.    - Augmentin duration per ID & primary      Seizure after hypoxic event on 12/20 with subsequent seizure 12/21 during dialysis:   - Keppra and vimpat indefinitely     Multifocal acute ischemic strokes on MRI(12/20).    - Eliquis now on hold d/t acute anemia with bloody secretions    Hx b/l pneumothoraces: right CT first placed 11/16, removed, then replaced by IR on 12/12, removed again 12/19.  Left chest tube placed 12/16, ?hemithorax, CT removed on 12/19    Hx mucus plugging of bronchi.  Bronch on 12/20 with minimal secretions.  Mucus plug suctioned 1/4 and restarted on metanebs, stopped d/t bloody secretions     Acute on chronic Anemia: s/p 1 unit PRBCs 1/4 for Hgb 6.3.  Intermittent bloody tracheal secretions, Apixaban held 1/4.  Hgb 6.8 on 1/6 s/p 1 unit PRBCs, and had large amount of bloody secretions; CTA neck & C/A/P unremarkable for acute bleed.  Bloody secretions now resolved.     Plan:   - Phase 2 weans: TM/PMV day.  TM/cuff up at night  - Right thora and paracentesis scheduled for tomorrow - labs ordered  - Bronchial hygiene with Albuterol + mucomyst nebs QID.     - VFSS likely later this week   - consider restarting anticoagulation after taps tomorrowi  - Repeat chest imaging ~1/23 before consider stopping Fluconazole      Henry Fenton CNP  Pulmonary Medicine  [facility]  Pager [phone]    Subjective/Interim Events:   - denies sob, n/v, fever, chills.  Biggest complaint today is discomfort from rectal tube     Tracheal secretions:  Overnight - x1, sm, thick  Yesterday - x7, sm/lg, thin/thick    Cough strength: weak/moderate    Current phase of ventilator weaning pathway:  Phase 2    Ventilator weaning results   1/8: TM for 17hr 53min (tolerated well  1/9: 30L/30%TM for 19hr 25min.  PMV for 2hr x2  1/10: continuous TM.  PMV for 11hr      Clinical status discussed today with respiratory therapist, patient    Medications:       dextrose       - MEDICATION INSTRUCTIONS -         sodium chloride 0.9%  300 mL Hemodialysis Machine During  Dialysis/CRRT (from stock)     acetaminophen  975 mg Per Feeding Tube Q8H BIJU    Or     acetaminophen  650 mg Rectal Q8H BIJU     acetylcysteine  2 mL Nebulization 4x daily     albuterol  2.5 mg Nebulization 4x daily     [Held by provider] apixaban ANTICOAGULANT  5 mg Oral or Feeding Tube BID     carboxymethylcellulose PF  1 drop Both Eyes TID     chlorhexidine  15 mL Mouth/Throat BID     epoetin mirta-epbx  40,000 Units Subcutaneous Weekly     fluconazole  200 mg Per Feeding Tube At Bedtime     folic acid  1 mg Oral or Feeding Tube Daily     heparin Lock (1000 units/mL High concentration)  1,900 Units Intracatheter During Dialysis/CRRT (from stock)     heparin Lock (1000 units/mL High concentration)  1,900 Units Intracatheter During Dialysis/CRRT (from stock)     insulin aspart  1-6 Units Subcutaneous Daily     insulin glargine  20 Units Subcutaneous Daily     lacosamide  100 mg Oral or Feeding Tube BID     lactulose  20 g Oral or Feeding Tube BID     levETIRAcetam  1,000 mg Oral or Feeding Tube Q24H     Melatonin  6 mg Oral or NG Tube At Bedtime     midodrine  10 mg Oral During Dialysis/CRRT (from stock)     midodrine  5 mg Oral or Feeding Tube TID w/meals     mineral oil-hydrophilic petrolatum   Topical Daily     multivitamin RENAL  1 tablet Per Feeding Tube Daily     nystatin  500,000 Units Swish & Swallow 4x Daily     pantoprazole  40 mg Per Feeding Tube BID     QUEtiapine  75 mg Oral or Feeding Tube At Bedtime     rifaximin  550 mg Per Feeding Tube BID     sodium chloride (PF)  10-30 mL Intracatheter Q8H     tacrolimus  1 mg Oral or Feeding Tube BID         Exam/Data:   Vitals  /60 (BP Location: Left arm)   Pulse 104   Temp 98.1  F (36.7  C) (Oral)   Resp 20   Wt 91.4 kg (201 lb 8 oz)   SpO2 98%   BMI 27.33 kg/m       I/O last 3 completed shifts:  In: 1670 [NG/GT:1670]  Out: 1200 [Stool:1200]  Weight change: -0.181 kg (-6.4 oz)    Vent Mode: Trach collar  FiO2 (%): 30 %  Resp Rate (Set): 12  breaths/min  Tidal Volume (Set, mL): 450 mL  PEEP (cm H2O): 5 cmH2O  Resp: 20      EXAM:  Gen: no distress sitting in chair   HEENT: NT, trach midline/intact, significant peristomal ecchymosis(improving), PMV on, no stridor, soft voice   CV: RRR  Resp: CTAB; non-labored   Abd: large, distended, soft, mildly tender, BS hypoactive  Skin: no visible rashes or lesions, scattered ecchymosis, fragile   Ext: mild pedal edema, weak  Neuro: alert, follows commands    ROS:  A 10-system review was obtained and is negative with the exception of the symptoms noted above.    Labs:  Basic Metabolic Panel  Recent Labs   Lab 01/11/23  0635 01/11/23  0610 01/11/23  0443 01/10/23  0608 01/10/23  0603 01/09/23  1824 01/09/23  0656     --  137 135*  --  137 134*   POTASSIUM 3.5  --  3.5 3.9  --  3.3* 3.7   CHLORIDE  --   --   --  96*  --   --  94*   CO2  --   --   --  28  --   --  27   BUN  --   --   --  50.5*  --   --  76.0*   CR  --   --   --  2.28*  --   --  3.14*   * 158* 152* 131*   < > 116 159*    < > = values in this interval not displayed.     CBC RESULTS: Recent Labs   Lab Test 01/09/23  0656 01/05/23  1442 01/04/23  0623 12/27/22  0420   WBC  --   --   --  7.5   RBC  --   --   --  2.76*   HGB 7.3*   < > 6.3* 8.8*   HCT  --   --   --  29.4*   MCV  --   --   --  107*   MCH  --   --   --  31.9   MCHC  --   --   --  29.9*   RDW  --   --   --  18.7*   PLT  --   --  58* 63*    < > = values in this interval not displayed.      Venous Blood Gas  Recent Labs   Lab 01/11/23  0635 01/11/23  0443 01/09/23  1824 01/05/23  2352   PHV 7.41 7.41 7.41 7.43   PCO2V 50 48 51* 49   PO2V 34 41 27 39   HCO3V 30 29 30 31*   MITA 6.9* 6.4* 7.6* 8.0*        RADIOLOGY: Personally reviewed; radiology read below   CTA CHEST ABDOMEN PELVIS W CONTRAST 1/6/2023                                                                   IMPRESSION:  1. No obvious active bleeding identified.  2. Large amount of abdominal/pelvic ascites, splenomegaly, and  scattered varicosities. The above findings would be most typical for sequelae of portal venous hypertension.  3. Significant stranding of the omental fat and intraperitoneal fat, this is nonspecific, but may be reactive or neoplastic in nature.  4. Percutaneous gastrostomy tube, tracheostomy, and the rectal tube appear to be in good position.  5. Central catheter with tip in the inferior right atrium.  6. Mild to moderate right pleural effusion and mild left pleural effusion, both of these appear to be loculated given the peripheral enhancement. Presumed sequelae of prior hematoma seen in the right lung.  7. Minute amount of gas seen associated with the left pleural effusion and this is presumed to be iatrogenic in nature.  8. Slight filling defect within the lower trachea most typical for slight mucous.  9. Enlargement of the main pulmonary artery presumed to represent sequelae of pulmonary arterial hypertension.   ----------------  CTA NECK WITH CONTRAST 1/6/2023     INDICATION: Tracheostomy in place, worsening bloody tracheal secretions, rule out tracheoinnominate fistula.                                                                   IMPRESSION:   1.  No significant stenosis or dissection.  2.  No definite findings to suggest a tracheoinnominate artery fistula.  ----------------------  XR CHEST  1/4/2023                                                                   IMPRESSION:      Right PICC terminates in the middle third of the SVC. Tunneled right jugular approach dialysis catheter terminates in the right atrium. Tracheostomy resides within the tracheal air column.     Persistent shallow lung inflation. Territories of bibasilar atelectasis are similar. Small right pleural effusion visible in the right lateral sulcus and layering into the interlobar fissures is similar. No enlarging pleural effusion. No pneumothorax.     Unchanged heart and mediastinal contours. Unchanged oblique interface just lateral  to the dialysis catheter/SVC and corresponds to rounded atelectasis on prior CT.

## 2023-01-12 ENCOUNTER — APPOINTMENT (OUTPATIENT)
Dept: SPEECH THERAPY | Facility: CLINIC | Age: 58
DRG: 207 | End: 2023-01-12
Attending: HOSPITALIST
Payer: COMMERCIAL

## 2023-01-12 ENCOUNTER — APPOINTMENT (OUTPATIENT)
Dept: OCCUPATIONAL THERAPY | Facility: CLINIC | Age: 58
DRG: 207 | End: 2023-01-12
Attending: HOSPITALIST
Payer: COMMERCIAL

## 2023-01-12 LAB
ABO/RH(D): NORMAL
ALBUMIN SERPL BCG-MCNC: 2.1 G/DL (ref 3.5–5.2)
ALP SERPL-CCNC: 368 U/L (ref 40–129)
ALT SERPL W P-5'-P-CCNC: 11 U/L (ref 10–50)
ANION GAP SERPL CALCULATED.3IONS-SCNC: 10 MMOL/L (ref 7–15)
ANTIBODY SCREEN: NEGATIVE
AST SERPL W P-5'-P-CCNC: 19 U/L (ref 10–50)
BASE EXCESS BLDV CALC-SCNC: 5.5 MMOL/L (ref -8.1–1.9)
BASE EXCESS BLDV CALC-SCNC: 5.5 MMOL/L (ref -8.1–1.9)
BASE EXCESS BLDV CALC-SCNC: 6.7 MMOL/L (ref -8.1–1.9)
BASOPHILS # BLD AUTO: 0 10E3/UL (ref 0–0.2)
BASOPHILS NFR BLD AUTO: 1 %
BILIRUB SERPL-MCNC: 0.4 MG/DL
BLD PROD TYP BPU: NORMAL
BLOOD COMPONENT TYPE: NORMAL
BUN SERPL-MCNC: 45.6 MG/DL (ref 6–20)
CA-I BLD-MCNC: 1.18 MMOL/L (ref 1.11–1.3)
CA-I BLD-MCNC: 1.2 MMOL/L (ref 1.11–1.3)
CA-I BLD-MCNC: 1.24 MMOL/L (ref 1.11–1.3)
CALCIUM SERPL-MCNC: 8.9 MG/DL (ref 8.6–10)
CHLORIDE SERPL-SCNC: 99 MMOL/L (ref 98–107)
CODING SYSTEM: NORMAL
CREAT SERPL-MCNC: 2.08 MG/DL (ref 0.67–1.17)
CROSSMATCH: NORMAL
DEPRECATED HCO3 PLAS-SCNC: 27 MMOL/L (ref 22–29)
EOSINOPHIL # BLD AUTO: 0.1 10E3/UL (ref 0–0.7)
EOSINOPHIL NFR BLD AUTO: 2 %
ERYTHROCYTE [DISTWIDTH] IN BLOOD BY AUTOMATED COUNT: 18.7 % (ref 10–15)
GFR SERPL CREATININE-BSD FRML MDRD: 36 ML/MIN/1.73M2
GLUCOSE BLD-MCNC: 118 MG/DL (ref 70–125)
GLUCOSE BLD-MCNC: 140 MG/DL (ref 70–125)
GLUCOSE BLD-MCNC: 152 MG/DL (ref 70–125)
GLUCOSE BLDC GLUCOMTR-MCNC: 157 MG/DL (ref 70–99)
GLUCOSE SERPL-MCNC: 152 MG/DL (ref 70–99)
HCO3 BLDV-SCNC: 28 MMOL/L (ref 24–30)
HCO3 BLDV-SCNC: 29 MMOL/L (ref 24–30)
HCO3 BLDV-SCNC: 29 MMOL/L (ref 24–30)
HCT VFR BLD AUTO: 22.3 % (ref 40–53)
HGB BLD-MCNC: 10.7 G/DL (ref 13.3–17.7)
HGB BLD-MCNC: 6.6 G/DL (ref 13.3–17.7)
HGB BLD-MCNC: 6.7 G/DL (ref 13.3–17.7)
HGB BLD-MCNC: 7.8 G/DL (ref 13.3–17.7)
IMM GRANULOCYTES # BLD: 0.2 10E3/UL
IMM GRANULOCYTES NFR BLD: 3 %
ISSUE DATE AND TIME: NORMAL
LACTATE BLD-SCNC: 1.3 MMOL/L (ref 0.7–2)
LACTATE BLD-SCNC: 1.5 MMOL/L (ref 0.7–2)
LACTATE BLD-SCNC: 2.3 MMOL/L (ref 0.7–2)
LDH SERPL L TO P-CCNC: 217 U/L (ref 0–250)
LYMPHOCYTES # BLD AUTO: 1.1 10E3/UL (ref 0.8–5.3)
LYMPHOCYTES NFR BLD AUTO: 23 %
MCH RBC QN AUTO: 30 PG (ref 26.5–33)
MCHC RBC AUTO-ENTMCNC: 29.1 G/DL (ref 31.5–36.5)
MCV RBC AUTO: 103 FL (ref 78–100)
MONOCYTES # BLD AUTO: 0.7 10E3/UL (ref 0–1.3)
MONOCYTES NFR BLD AUTO: 14 %
NEUTROPHILS # BLD AUTO: 2.6 10E3/UL (ref 1.6–8.3)
NEUTROPHILS NFR BLD AUTO: 57 %
NRBC # BLD AUTO: 0.1 10E3/UL
NRBC BLD AUTO-RTO: 1 /100
PCO2 BLDV: 44 MM HG (ref 35–50)
PCO2 BLDV: 47 MM HG (ref 35–50)
PCO2 BLDV: 49 MM HG (ref 35–50)
PH BLDV: 7.4 [PH] (ref 7.35–7.45)
PH BLDV: 7.41 [PH] (ref 7.35–7.45)
PH BLDV: 7.45 [PH] (ref 7.35–7.45)
PHOSPHATE SERPL-MCNC: 2.5 MG/DL (ref 2.5–4.5)
PLATELET # BLD AUTO: 167 10E3/UL (ref 150–450)
PO2 BLDV: 29 MM HG (ref 25–47)
PO2 BLDV: 33 MM HG (ref 25–47)
PO2 BLDV: 34 MM HG (ref 25–47)
POTASSIUM BLD-SCNC: 3.4 MMOL/L (ref 3.5–5)
POTASSIUM BLD-SCNC: 3.5 MMOL/L (ref 3.5–5)
POTASSIUM BLD-SCNC: 3.6 MMOL/L (ref 3.5–5)
POTASSIUM SERPL-SCNC: 3.5 MMOL/L (ref 3.4–5.3)
PROT SERPL-MCNC: 6.4 G/DL (ref 6.4–8.3)
RBC # BLD AUTO: 2.17 10E6/UL (ref 4.4–5.9)
SATV LHE POCT: 54.6 % (ref 70–75)
SATV LHE POCT: 63.8 % (ref 70–75)
SATV LHE POCT: 64.7 % (ref 70–75)
SODIUM BLD-SCNC: 135 MMOL/L (ref 136–145)
SODIUM BLD-SCNC: 137 MMOL/L (ref 136–145)
SODIUM BLD-SCNC: 137 MMOL/L (ref 136–145)
SODIUM SERPL-SCNC: 136 MMOL/L (ref 136–145)
SPECIMEN EXPIRATION DATE: NORMAL
UNIT ABO/RH: NORMAL
UNIT NUMBER: NORMAL
UNIT STATUS: NORMAL
UNIT TYPE ISBT: 5100
WBC # BLD AUTO: 4.7 10E3/UL (ref 4–11)

## 2023-01-12 PROCEDURE — 84295 ASSAY OF SERUM SODIUM: CPT | Performed by: HOSPITALIST

## 2023-01-12 PROCEDURE — 250N000013 HC RX MED GY IP 250 OP 250 PS 637: Performed by: HOSPITALIST

## 2023-01-12 PROCEDURE — 258N000003 HC RX IP 258 OP 636: Performed by: HOSPITALIST

## 2023-01-12 PROCEDURE — 94640 AIRWAY INHALATION TREATMENT: CPT

## 2023-01-12 PROCEDURE — 99232 SBSQ HOSP IP/OBS MODERATE 35: CPT | Performed by: NURSE PRACTITIONER

## 2023-01-12 PROCEDURE — 83615 LACTATE (LD) (LDH) ENZYME: CPT | Performed by: NURSE PRACTITIONER

## 2023-01-12 PROCEDURE — 97535 SELF CARE MNGMENT TRAINING: CPT | Mod: GO

## 2023-01-12 PROCEDURE — 94640 AIRWAY INHALATION TREATMENT: CPT | Mod: 76

## 2023-01-12 PROCEDURE — 86923 COMPATIBILITY TEST ELECTRIC: CPT | Performed by: HOSPITALIST

## 2023-01-12 PROCEDURE — G0463 HOSPITAL OUTPT CLINIC VISIT: HCPCS

## 2023-01-12 PROCEDURE — 999N000157 HC STATISTIC RCP TIME EA 10 MIN

## 2023-01-12 PROCEDURE — 250N000009 HC RX 250: Performed by: HOSPITALIST

## 2023-01-12 PROCEDURE — P9016 RBC LEUKOCYTES REDUCED: HCPCS | Performed by: HOSPITALIST

## 2023-01-12 PROCEDURE — 250N000009 HC RX 250: Performed by: NURSE PRACTITIONER

## 2023-01-12 PROCEDURE — 120N000017 HC R&B RESPIRATORY CARE

## 2023-01-12 PROCEDURE — 99233 SBSQ HOSP IP/OBS HIGH 50: CPT | Performed by: HOSPITALIST

## 2023-01-12 PROCEDURE — 82330 ASSAY OF CALCIUM: CPT

## 2023-01-12 PROCEDURE — 86901 BLOOD TYPING SEROLOGIC RH(D): CPT | Performed by: HOSPITALIST

## 2023-01-12 PROCEDURE — 84100 ASSAY OF PHOSPHORUS: CPT | Performed by: HOSPITALIST

## 2023-01-12 PROCEDURE — 92507 TX SP LANG VOICE COMM INDIV: CPT | Mod: GN | Performed by: SPEECH-LANGUAGE PATHOLOGIST

## 2023-01-12 PROCEDURE — 80053 COMPREHEN METABOLIC PANEL: CPT

## 2023-01-12 PROCEDURE — 999N000123 HC STATISTIC OXYGEN O2DAILY TECH TIME

## 2023-01-12 PROCEDURE — 87340 HEPATITIS B SURFACE AG IA: CPT | Performed by: PHYSICIAN ASSISTANT

## 2023-01-12 PROCEDURE — 999N000009 HC STATISTIC AIRWAY CARE

## 2023-01-12 PROCEDURE — 85025 COMPLETE CBC W/AUTO DIFF WBC: CPT | Performed by: HOSPITALIST

## 2023-01-12 PROCEDURE — 250N000012 HC RX MED GY IP 250 OP 636 PS 637: Performed by: HOSPITALIST

## 2023-01-12 PROCEDURE — 250N000013 HC RX MED GY IP 250 OP 250 PS 637: Performed by: NURSE PRACTITIONER

## 2023-01-12 PROCEDURE — 97110 THERAPEUTIC EXERCISES: CPT | Mod: GO

## 2023-01-12 RX ADMIN — NYSTATIN 500000 UNITS: 100000 SUSPENSION ORAL at 20:57

## 2023-01-12 RX ADMIN — Medication 1 DROP: at 08:24

## 2023-01-12 RX ADMIN — LEVETIRACETAM 1000 MG: 100 SOLUTION ORAL at 20:56

## 2023-01-12 RX ADMIN — ACETYLCYSTEINE 2 ML: 200 SOLUTION ORAL; RESPIRATORY (INHALATION) at 15:37

## 2023-01-12 RX ADMIN — Medication 40 MG: at 20:57

## 2023-01-12 RX ADMIN — CHLORHEXIDINE GLUCONATE 0.12% ORAL RINSE 15 ML: 1.2 LIQUID ORAL at 08:25

## 2023-01-12 RX ADMIN — TACROLIMUS 1 MG: 5 CAPSULE ORAL at 17:28

## 2023-01-12 RX ADMIN — ALBUTEROL SULFATE 2.5 MG: 2.5 SOLUTION RESPIRATORY (INHALATION) at 15:36

## 2023-01-12 RX ADMIN — Medication 1 DROP: at 20:58

## 2023-01-12 RX ADMIN — ACETYLCYSTEINE 2 ML: 200 SOLUTION ORAL; RESPIRATORY (INHALATION) at 07:30

## 2023-01-12 RX ADMIN — FLUCONAZOLE 200 MG: 200 TABLET ORAL at 21:32

## 2023-01-12 RX ADMIN — WHITE PETROLATUM: 1.75 OINTMENT TOPICAL at 18:07

## 2023-01-12 RX ADMIN — RIFAXIMIN 550 MG: 550 TABLET ORAL at 20:58

## 2023-01-12 RX ADMIN — LACOSAMIDE 100 MG: 10 SOLUTION ORAL at 20:57

## 2023-01-12 RX ADMIN — NYSTATIN 500000 UNITS: 100000 SUSPENSION ORAL at 13:27

## 2023-01-12 RX ADMIN — NYSTATIN 500000 UNITS: 100000 SUSPENSION ORAL at 08:24

## 2023-01-12 RX ADMIN — Medication 1 TABLET: at 08:31

## 2023-01-12 RX ADMIN — ACETAMINOPHEN 975 MG: 325 TABLET, FILM COATED ORAL at 06:34

## 2023-01-12 RX ADMIN — ZOLPIDEM TARTRATE 5 MG: 5 TABLET ORAL at 23:27

## 2023-01-12 RX ADMIN — SODIUM CHLORIDE 1000 ML: 9 INJECTION, SOLUTION INTRAVENOUS at 00:28

## 2023-01-12 RX ADMIN — TACROLIMUS 1 MG: 5 CAPSULE ORAL at 08:24

## 2023-01-12 RX ADMIN — Medication 6 MG: at 20:58

## 2023-01-12 RX ADMIN — ACETAMINOPHEN 975 MG: 325 TABLET, FILM COATED ORAL at 21:09

## 2023-01-12 RX ADMIN — ALBUTEROL SULFATE 2.5 MG: 2.5 SOLUTION RESPIRATORY (INHALATION) at 11:31

## 2023-01-12 RX ADMIN — NYSTATIN 500000 UNITS: 100000 SUSPENSION ORAL at 17:28

## 2023-01-12 RX ADMIN — ACETYLCYSTEINE 2 ML: 200 SOLUTION ORAL; RESPIRATORY (INHALATION) at 11:31

## 2023-01-12 RX ADMIN — ALBUTEROL SULFATE 2.5 MG: 2.5 SOLUTION RESPIRATORY (INHALATION) at 07:30

## 2023-01-12 RX ADMIN — MIDODRINE HYDROCHLORIDE 5 MG: 5 TABLET ORAL at 11:39

## 2023-01-12 RX ADMIN — ALBUTEROL SULFATE 2.5 MG: 2.5 SOLUTION RESPIRATORY (INHALATION) at 20:34

## 2023-01-12 RX ADMIN — Medication 40 MG: at 08:31

## 2023-01-12 RX ADMIN — Medication 1 DROP: at 13:27

## 2023-01-12 RX ADMIN — ACETYLCYSTEINE 2 ML: 200 SOLUTION ORAL; RESPIRATORY (INHALATION) at 20:39

## 2023-01-12 RX ADMIN — INSULIN GLARGINE 20 UNITS: 100 INJECTION, SOLUTION SUBCUTANEOUS at 08:31

## 2023-01-12 RX ADMIN — LACOSAMIDE 100 MG: 10 SOLUTION ORAL at 08:24

## 2023-01-12 RX ADMIN — FOLIC ACID 1 MG: 1 TABLET ORAL at 08:25

## 2023-01-12 RX ADMIN — ACETAMINOPHEN 650 MG: 650 SOLUTION ORAL at 00:15

## 2023-01-12 RX ADMIN — RIFAXIMIN 550 MG: 550 TABLET ORAL at 08:31

## 2023-01-12 RX ADMIN — LACTULOSE 20 G: 20 SOLUTION ORAL at 08:24

## 2023-01-12 RX ADMIN — CHLORHEXIDINE GLUCONATE 0.12% ORAL RINSE 15 ML: 1.2 LIQUID ORAL at 20:57

## 2023-01-12 RX ADMIN — MIDODRINE HYDROCHLORIDE 5 MG: 5 TABLET ORAL at 08:24

## 2023-01-12 RX ADMIN — ACETAMINOPHEN 975 MG: 325 TABLET, FILM COATED ORAL at 13:26

## 2023-01-12 RX ADMIN — QUETIAPINE 75 MG: 25 TABLET ORAL at 20:58

## 2023-01-12 RX ADMIN — MIDODRINE HYDROCHLORIDE 5 MG: 5 TABLET ORAL at 17:28

## 2023-01-12 RX ADMIN — LACTULOSE 20 G: 20 SOLUTION ORAL at 20:57

## 2023-01-12 ASSESSMENT — ACTIVITIES OF DAILY LIVING (ADL)
ADLS_ACUITY_SCORE: 62
ADLS_ACUITY_SCORE: 62
ADLS_ACUITY_SCORE: 66
ADLS_ACUITY_SCORE: 62
ADLS_ACUITY_SCORE: 66
ADLS_ACUITY_SCORE: 66
ADLS_ACUITY_SCORE: 62
ADLS_ACUITY_SCORE: 62

## 2023-01-12 NOTE — PLAN OF CARE
7 PM to 7 AM RT PROGRESS NOTE     DATA:     CURRENT SETTINGS:             TRACH TYPE / SIZE:  7 Bivona placed 1/9/23             MODE:  30% @ 30 LPM HFTM with cuff up.     ACTION:             THERAPIES:   QID Albuterol and Mucomyst neb given             SUCTION:                           FREQUENCY:   X 4                        AMOUNT:   large x 1, small x 2, mod x 1                        CONSISTENCY:   thick                        COLOR:   white             SPONTANEOUS COUGH EFFORT/STRENGTH OF EFFORT (not elicited by suctioning): Good                              WEANING PHASE:  1-10-23: TM/PMV day.  TM/cuff up night                        WEAN MODE:    30% @ 30 LPM HFTM with cuff down.                        WEAN TIME:   1323-5414, for 11 hours and 50 minutes of cuff deflation. PMV from 9984-1874, for 9 hours and 38 minutes, as per day RT report. Pts family had requested PMV removed at 1801. Pt seemed to tolerate wean.                         END WEAN REASON:   End of designated wean time.     RESPONSE:             BS:   Clear and dim to Rhonchi              VITAL SIGNS:   SATs %, HR , RR 24-28 and a little shallow at times.             RISK FOR SELF DECANNULATION:  No     NOTE / PLAN:   Anticipate pt going to [facility] for a right thoracentesis and paracentesis at 1100. Continue with same otherwise.    Problem: Mechanical Ventilation Invasive  Goal: Optimal Device Function  Outcome: Progressing  Intervention: Optimize Device Care and Function  Recent Flowsheet Documentation  Taken 1/10/2023 0103 by Ivon Clayton, RT  Airway Safety Measures:    all equipment/monitors on and audible    manual resuscitator/mask/valve in room  Taken 1/9/2023 2228 by Ivon Clayton, RT  Airway Safety Measures:    all equipment/monitors on and audible    manual resuscitator/mask/valve in room  Taken 1/9/2023 1948 by Ivon Clayton, RT  Airway Safety Measures:    all equipment/monitors on and audible    manual  resuscitator/mask/valve in room  Taken 1/9/2023 1930 by Ivon Clayton, RT  Airway Safety Measures:    all equipment/monitors on and audible    manual resuscitator/mask/valve in room  Goal: Mechanical Ventilation Liberation  Outcome: Progressing  Goal: Optimal Nutrition Delivery  Outcome: Progressing  Goal: Absence of Device-Related Skin and Tissue Injury  Outcome: Progressing  Goal: Absence of Ventilator-Induced Lung Injury  Outcome: Progressing

## 2023-01-12 NOTE — PLAN OF CARE
Problem: Plan of Care - These are the overarching goals to be used throughout the patient stay.    Goal: Absence of Hospital-Acquired Illness or Injury  Intervention: Identify and Manage Fall Risk  Recent Flowsheet Documentation  Taken 1/11/2023 2241 by Dee Toscano RN  Safety Promotion/Fall Prevention: activity supervised     Problem: Plan of Care - These are the overarching goals to be used throughout the patient stay.    Goal: Absence of Hospital-Acquired Illness or Injury  Intervention: Prevent Skin Injury  Recent Flowsheet Documentation  Taken 1/11/2023 1845 by Dee Toscano RN  Body Position:   turned   right   Goal Outcome Evaluation:  Patient was calm and cooperative with cares. Patient had position changed from side to side  every two hours and tolerated it well.PRN Tylenol Given X 1 for pain.

## 2023-01-12 NOTE — PROGRESS NOTES
[facility]    Medicine Progress Note - Hospitalist Service    Date of Admission:  12/29/2022  Brief history:  Blanco Osborne is an 57 year old male who is transferred from [facility] f to [facility] for  IV antibiotic treatment.  Patient has multiple medical problems including history of liver transplant 2016, PAF on chronic anticoagulation, history of DM2, CVA, HTN, CHRISTIAN on BiPAP, and history of alcohol abuse in the past causing liver damage ending with liver transplant.  About 6 weeks ago he had respiratory arrest and was diagnosed with infection with parainfluenza and strep pneumoniae and acute on chronic renal insufficiency ending with CRF needing 3/week hemodialysis.  During this period he was diagnosed with cirrhosis of transplanted liver and hyperammonia.  Currently he is on Lactulose and Rifaximin.        On 12/14/22, due to CIP, he was transferred to [facility] for the first time with Trach, PEG and Rt chest tube.    On 12/16/2022 patient was transferred back to [facility] due to respiratory insufficiency secondary to hydropneumothorax and drainage of 900 cc bloody pleural effusion, bloody stool and transfusion of 2 units PRBC.      On 12/20/2022 he had another episode of PEA arrest followed by CPR x2 minutes and possibly had seizure activity in [facility]. 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  He has been on ventilator and has improved to trach dome tolerating up to 6 hours so far. He had chest tube which was removed.    He had SBP with growth of lactobacillus and is currently on Unasyn which will be switched to Augmentin orally through 1/12/2023.    Pleural effusion grew Candida and is on micafungin.  He is also on  due to cirrhosis and hyperammonia.  He is anemic and is on Retacrit.   He is on lacosamide and Keppra due to history of seizure.  There is question of him drinking again  causing him cirrhosis of the transplanted liver.  LTAC course:  1/10/2023.  I attended patient care conference earlier today.  Worsening ascites compared to previous days.  Plan for paracentesis per IR tomorrow  1/11/2023.  Dialyzing today.  Plan for paracentesis tomorrow.  Otherwise continue current medical management.  No new changes at this time.  1/12.  Overnight patient had to be resuscitated with 1 L normal saline bolus after BPA sepsis fired noted to have lactic acid of 2.3.  This morning his hemoglobin is 6.6.  Typed screened and now transfusing packed red blood cells.  Paracentesis scheduled for tomorrow    Assessment & Plan   Principal Problem:    Acute on chronic respiratory failure with hypoxia (H)  Active Problems:    Acquired immunocompromised state (H)    Cirrhosis of liver (H)    NAFLD (nonalcoholic fatty liver disease)    Liver transplanted (H)    Immunosuppression (H)    Acute kidney injury (H)    Spontaneous bacterial peritonitis (H)    Critical illness myopathy    History of sudden cardiac arrest, PEA    Dependent on hemodialysis (H)    Acute anemia.  -Hemoglobin 6.6.  -No evidence of overt bleeding.  -Anemia most likely secondary to critical illness/chronic disease  -Type screen and transfuse packed red blood cells  -Monitor H&H.    Acute now chronic respiratory failure requiring tracheostomy. Initial event was parainfluenza and strep pneumo pneumonia.  Had failed extubation x2 and tracheostomy performed last hospitalization.  Had additional complications of bilateral pneumothoraces.  (Most recently was a hydropneumothorax where fluid grew Candida.. Chest tube was placed and removed . Had decent tolerance for pressure support and trach dome but after few hours and trach dome 12/27 had a pretty significant episode of desaturation.   Plan:  -Wean ventilator per pulmonology.  -Good pulmonary toilet  -Tracheostomy care per RT    History PEA  Hypotension  PEA arrest prior to his previous admission  and 1 episode of PEA associated with desaturation on 12/20.  No structural cardiac disease but does have A. Fib which neurologist thought might have been contributory to his embolic strokes.  Anticoagulation been restarted with stable labs although high risk for bleeding seconday to thrombocytopenia.  Also has developed baseline hypotension and is on midodrine 10 mg 3 times daily.  Plan:  Continue Eliquis.    Continue current midodrine dose     Seizure after hypoxic event.  This is in the context of baseline multifactorial encephalopathy secondary to his ongoing critical illness and prior cardiac arrests and prior strokes and cirrhosis with hepatic encephalopathy. MRI of head of 12/20/22 reveals no PRES or leptomeningeal enhancement but scattered.  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   Plan:  - Continue with  Keppra  indefinitely.  Neurology follow-up.   - Anticoagulation indefinitely for secondary stroke prevention.  On hold since 1/6/2023 due to tracheal bleeding during suction.  Patient is currently in sinus rhythm.  And telemetry and monitored.  - Tylenol TID for headache.        ESRD: Now on iHD.  No return of renal function.  - MWF schedule      Infectious disease  1. 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 4.  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  Have had some conversations within our group and with transplant ID and general infectious disease.  General consensus is that ganciclovir probably could be discontinued  2. H/o Strep/Parainfluenza infection last hospitalization. Completed treatment course  3. 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  4.  Lactobacillus growing in the broth 4 days after paracentesis.  Cell count was very low.  5.  Candida in left pleural fluid cultures.  Sensitivities not resulted.  6.  Immunosuppressed on tacrolimus.  Discussed with transplant service at the [facility] who felt that given the absence of rejection on his most recent  biopsy and ongoing issues with infection continuing with 1 twice daily tacrolimus is the best current option.  They have seen his subtherapeutic trough levels.  Plan:  1.  Confirm with transplant ID whether or not we can stop ganciclovir.  2.  Unasyn for 2 weeks total.  Change to Augmentin through 1/12/2023.  3.  At least 4 weeks of antimicrobial for the Candida.  If sensitive to fluconazole could de-escalate from micafungin.  4.  Continue tacrolimus as above and we are tapering steroids    Post transplant cirrhosis.  Main manifestations of this are hepatic encephalopathy and ascites.  Hepatologist at [place] felt that most likely reason for the cirrhosis was metabolic syndrome or alcohol intake.  There was no evidence of rejection on biopsy and there was some evidence of regeneration. Paracentesis done on 12/22/2022 with lack of bacillus in the broth indicating SBP low cell count was very low  Plan:  1.  Intermittent paracentesis as needed.  2.  Tacrolimus 1 mg BID and tapering steroids   3.  Lactulose and Rifaximin as above  4.  Treat SBP for 2 weeks.  5.  He has an appointment in April with Dr. Simms if he is out of the hospital.    Diabetes mellitus type 2  1. DM.  Titrating insulin. He needs supplemental coverage.  2. He is on steroids which we are weaning  Plan:  -Continue with current regimen.   -Hypoglycemic protocol in place    Moderate malnutrition, protein and calorie type.  -Continue with tube feeds via PEG tube  -Nutritionist consulted and following      Pancytopenia due cirrhosis, ESRD and hypersplenism. WBC count has improved.  However he needs frequent PRBC transfusion due Hb <7.0.   Patient is on anticoagulation for PAF.  Currently in sinus rhythm.  Is off anticoagulation due to tracheal bleeding.  Plan:  1.  Eliquis is on hold for now.     Code Status: Full     Diet: Adult Formula Drip Feeding: Continuous Novasource Renal; Gastrostomy; Goal Rate: 60; mL/hr    DVT Prophylaxis: DOAC  Nixon  Catheter: Not present  Central Lines: PRESENT      Cardiac Monitoring: ACTIVE order. Indication: Tachyarrhythmias, acute (48 hours)  Code Status: Full Code      Disposition Plan     Expected Discharge Date: 01/25/2023    Discharge Delays: Complex Discharge    Discharge Comments: Vent. Trach. Phase 1. PEG.  Rectal Tube.      The patient's care was discussed with the Bedside Nurse, Patient and Patient's Family.    Yahir Barcenas MD  Hospitalist Service  LTACH  Securely message with the Vocera Web Console (learn more here)  Text page via [employer] Paging/Directory     Clinically Significant Risk Factors          # Hypocalcemia: Lowest iCa = 1.18 mg/dL in last 2 days, will monitor and replace as appropriate  # Hypercalcemia: corrected calcium is >10.1, will monitor as appropriate    # Hypoalbuminemia: Lowest albumin = 1.8 g/dL at 12/29/2022  4:40 AM, will monitor as appropriate             # Severe Malnutrition: based on nutrition assessment      _____________________________________________________________________    Interval History   Patient was hypotensive overnight required fluid resuscitation with 1 L normal saline bolus.  This morning he is hemoglobin of 6.6.  He denies any abdominal pain or shortness of breath.  Overall just about the same compared to yesterday    Data reviewed today: I reviewed all medications, new labs and imaging results over the last 24 hours. I personally reviewed no images or EKG's today.    Physical Exam   Vital Signs: Temp: 98  F (36.7  C) Temp src: Oral BP: 111/69 Pulse: 104   Resp: 22 SpO2: 96 % O2 Device: Trach dome Oxygen Delivery: 20 LPM  Weight: 196 lbs 14.4 oz   General: Patient is lying in bed comfortably, he is awake alert oriented to person  HEENT: Head appears normocephalic atraumatic, eyes pupils appear equal round and reactive to light  Neck: Viable trach noted  Lungs: He has a normal respiratory effort, good air entry is noted no obvious wheezing noted  Heart: He has a good S1  and S2 no obvious murmurs, peripheral pulses are palpable bilaterally  Abdomen: Soft, distended, ascites noted, bowel sounds noted  Musculoskeletal: Good muscle tone, PICC line in right brachial artery, nails and digits appear acyanotic I did not examine his range of motion  Skin: No obvious rashes on inspection, warm to touch, please refer to nursing/wound care note for complete skin exam    Data   Recent Labs   Lab 01/12/23  0625 01/12/23  0621 01/12/23  0442 01/12/23  0003 01/11/23  0637 01/11/23  0443 01/10/23  0608 01/09/23  1824 01/09/23  0656   WBC  --  4.7  --   --   --   --  4.8  --   --    HGB  --  6.6* 6.7* 10.7*  --    < > 7.2*   < > 7.3*   MCV  --  103*  --   --   --   --  102*  --   --    PLT  --  167  --   --   --   --  131*  --   --    NA  --  136 137 135*  --    < > 135*   < > 134*   POTASSIUM  --  3.5 3.4* 3.6  --    < > 3.9   < > 3.7   CHLORIDE  --  99  --   --   --   --  96*  --  94*   CO2  --  27  --   --   --   --  28  --  27   BUN  --  45.6*  --   --   --   --  50.5*  --  76.0*   CR  --  2.08*  --   --   --   --  2.28*  --  3.14*   ANIONGAP  --  10  --   --   --   --  11  --  13   KELLY  --  8.9  --   --   --   --  8.9  --  9.2   * 152* 152* 140*  --    < > 131*   < > 159*   ALBUMIN  --  2.1*  --   --   --   --  2.0*  --   --    PROTTOTAL  --  6.4  --   --  6.3*  --  6.4   < >  --    BILITOTAL  --  0.4  --   --   --   --  0.5  --   --    ALKPHOS  --  368*  --   --   --   --  361*  --   --    ALT  --  11  --   --   --   --  11  --   --    AST  --  19  --   --   --   --  24  --   --     < > = values in this interval not displayed.     Medications     dextrose       - MEDICATION INSTRUCTIONS -         sodium chloride 0.9%  300 mL Hemodialysis Machine During Dialysis/CRRT (from stock)     acetaminophen  975 mg Per Feeding Tube Q8H BIJU    Or     acetaminophen  650 mg Rectal Q8H BIJU     acetylcysteine  2 mL Nebulization 4x daily     albuterol  2.5 mg Nebulization 4x daily     [Held by provider]  apixaban ANTICOAGULANT  5 mg Oral or Feeding Tube BID     carboxymethylcellulose PF  1 drop Both Eyes TID     chlorhexidine  15 mL Mouth/Throat BID     epoetin mirta-epbx  40,000 Units Subcutaneous Weekly     fluconazole  200 mg Per Feeding Tube At Bedtime     folic acid  1 mg Oral or Feeding Tube Daily     heparin Lock (1000 units/mL High concentration)  1,900 Units Intracatheter During Dialysis/CRRT (from stock)     heparin Lock (1000 units/mL High concentration)  1,900 Units Intracatheter During Dialysis/CRRT (from stock)     insulin aspart  1-6 Units Subcutaneous Daily     insulin glargine  20 Units Subcutaneous Daily     lacosamide  100 mg Oral or Feeding Tube BID     lactulose  20 g Oral or Feeding Tube BID     levETIRAcetam  1,000 mg Oral or Feeding Tube Q24H     Melatonin  6 mg Oral or NG Tube At Bedtime     midodrine  10 mg Oral During Dialysis/CRRT (from stock)     midodrine  5 mg Oral or Feeding Tube TID w/meals     mineral oil-hydrophilic petrolatum   Topical Daily     multivitamin RENAL  1 tablet Per Feeding Tube Daily     nystatin  500,000 Units Swish & Swallow 4x Daily     pantoprazole  40 mg Per Feeding Tube BID     QUEtiapine  75 mg Oral or Feeding Tube At Bedtime     rifaximin  550 mg Per Feeding Tube BID     sodium chloride (PF)  10-30 mL Intracatheter Q8H     tacrolimus  1 mg Oral or Feeding Tube BID   In speaking valve

## 2023-01-12 NOTE — PROGRESS NOTES
Note created for Utilization Review purposes only   01/12/23 0806   Appointment Info   Signing Clinician's Name / Credentials (OT) Em Peterson OT OTD   Self-Care/Home Management   Self-Care/Home Mgmt/ADL, Compensatory, Meal Prep Minutes (01360) 8   Symptoms Noted During/After Treatment (Meal Preparation/Planning Training) fatigue   Treatment Detail/Skilled Intervention Pt Hgb low - RN ok'd in bed/gentle activity - HOB raised ~ 45% to increase upright positioning. Facilitated face level g/h to progress IND with ADL tasks. Provided set up assist for g/h tasks, pt reporting some fatigue with tasks, required increased encouragement and step by step cueing. Mod A for hair brushing d/t UE weakness and decreased AROM, extended time to complete d/t fatigue.   Therapeutic Procedures/Exercise   Therapeutic Procedure: strength, endurance, ROM, flexibillity minutes (59874) 12   Symptoms Noted During/After Treatment fatigue   Treatment Detail/Skilled Intervention Facilitated UE ROM in bed to progress strength and ind with ADLs/transfers, gentle pace and activity d/t low Hgb. Pt required min cueing throughout session to remain alert and on task. Able to complete active elbow/wrist/hand ROM on bilateral UE, required assistance for active shoulder ROM to 90 degrees - completed ~ 10 reps of each exercise, min rest breaks in between. O2 sats stable throughout session - on 32%, 20 LPM.   OT Discharge Planning   OT Plan 1/12: trach dome - 20L/32%, sitting EOB, bed mobility, ADL progression at EOB, activity tolerance, BUE strengthening - increase active ROM at shoulder, grasp/release exer (LUE weaker), daily orientation review   OT Discharge Recommendation (DC Rec) Transitional Care Facility   OT Rationale for DC Rec Pt significantly below baseline for ADL performance. Limited by O2 needs, significant deconditioning, weakness from prolonged LTACH, ICU stay. Has supportive family and motivated to improve skills.   OT Brief overview of  current status In bed activities d/t low Hgb - increased fatigue and participation in ADL/exer tasks   Total Session Time   Timed Code Treatment Minutes 20   Total Session Time (sum of timed and untimed services) 20

## 2023-01-12 NOTE — PLAN OF CARE
Problem: Mechanical Ventilation Invasive  Goal: Optimal Device Function  Outcome: Progressing  Intervention: Optimize Device Care and Function  Recent Flowsheet Documentation  Taken 1/7/2023 1631 by Wilnre Beyer, RT  Airway Safety Measures: all equipment/monitors on and audible  Taken 1/7/2023 1500 by Wilner Beyer, RT  Airway Safety Measures: all equipment/monitors on and audible  Taken 1/7/2023 1344 by Wilner Beyer, RT  Airway Safety Measures: all equipment/monitors on and audible  Taken 1/7/2023 1138 by Wilner Beyer, RT  Airway Safety Measures: all equipment/monitors on and audible  Taken 1/7/2023 0805 by Wilner Beyer, RT  Airway Safety Measures: all equipment/monitors on and audible  Taken 1/7/2023 0738 by Wilner Beyer, RT  Airway Safety Measures: all equipment/monitors on and audible  Goal: Mechanical Ventilation Liberation  Outcome: Progressing   RT PROGRESS NOTE     DATA:     CURRENT SETTINGS:ac/12/450/+5, 25%, SB             TRACH TYPE / SIZE:  # 7 bivona 01/9             MODE:   AC MODE             FIO2:   30%     ACTION:             THERAPIES:   ALB/MUCCO QID             SUCTION:                           FREQUENCY:   X5                        AMOUNT:   small/mod                        CONSISTENCY:   thick                        COLOR:   white             SPONTANEOUS COUGH EFFORT/STRENGTH OF EFFORT (not elicited by suctioning):                               WEANING PHASE:   2                        WEAN MODE:    30%tm +200L                        WEAN TIME:  as ashley                        END WEAN REASON:   cont      RESPONSE:             BS:   crs             VITAL SIGNS:   %, RR 20-22             EMOTIONAL NEEDS / CONCERNS:               RISK FOR SELF DECANNULATION:                         RISK DUE TO:                         INTERVENTION/S IN PLACE IS/ARE:       NOTE / PLAN:   Goal Outcome Evaluation:         Placed pt on 30%tm with 20L , tolerating well,  uses pmv days as ashley, PMV started at 0957   Plan-  Cont current care plan

## 2023-01-12 NOTE — PROGRESS NOTES
Pulmonary Progress Note    Admit Date: 12/29/2022  CODE: Full Code    Assessment/Plan:   57yoM with liver transplant(2016), recent prolonged hospitalization 11/12-12/15 for PEA cardiac arrest, Strep/Parainfluenza pneumonia with ARDS, MODS, ?aspergillus pna s/p 1 week of voriconazole, right PTX requiring chest tube since removed.  Now HD dependent, s/p trach/PEG.  Course further c/b new left PTX requiring chest tube(since removed), b/l pleural effusions, ascites, mucus plugging, PEA arrest and seizure on 12/20, pleural fluid cxs growing candida parapsilosis & ascites cx growing lactobacillus on antimicrobials, and CSF panel positive for HHV6 ultimately decided to not treat.  Admitted to LTAC 12/29.     Discussion of pertinent problems:  Acute hypoxic/hypercapnic respiratory failure s/p trach: Liberated from the vent on 1/9 with acceptable VBG on trach dome.      Hx bilateral pleural effusions s/p multiple thoracentesis: moderate right effusion on recent imaging     Tracheostomy in place: 8 Shiley placed 11/29.  Changed to 6 Shiley prox xlt 12/8, unclear why.  Downsized to 7 Bivona 1/9 without issue.      Dysphagia s/p PEG tube.  BDT 12/30 with delayed aspiration.  Tolerating PMV trials well after trach downsize.  Repeat BDT 1/10 with no immediate and faint delayed aspiration.  Bedside swallow eval today shows s/s aspiration.       Candida in left pleural fluid cultures: started on Micafungin switched to fluconazole 1/3 based on cx sensitivities.    - Cont fluconazole for ~4 week total course based on imaging; tentative end date ~1/23    [facility]ORETTA on MWF HD: followed by Nephrology     S/p Liver transplant with post transplant cirrhosis & ascites s/p paracentesis 12/22: on [place] and prednisone    CHRISTIAN on home BiPAP.  Managed currently with trach and vent     lactobacillus peritonitis started on Unasyn transitioned to PO Augmentin.    - Augmentin duration per ID & primary     Seizure after hypoxic event on 12/20 with  subsequent seizure 12/21 during dialysis:   - Keppra and vimpat indefinitely     Multifocal acute ischemic strokes on MRI(12/20).    - Eliquis now on hold d/t acute anemia with bloody secretions    Hx b/l pneumothoraces: right CT first placed 11/16, removed, then replaced by IR on 12/12, removed again 12/19.  Left chest tube placed 12/16, ?hemithorax, CT removed on 12/19    Hx mucus plugging of bronchi.  Bronch on 12/20 with minimal secretions.  Mucus plug suctioned 1/4 and restarted on metanebs, stopped d/t bloody secretions     Acute on chronic Anemia: s/p 1 unit PRBCs 1/4 for Hgb 6.3.  Intermittent bloody tracheal secretions, Apixaban held 1/4.  Hgb 6.8 on 1/6 s/p 1 unit PRBCs, and had large amount of bloody secretions; CTA neck & C/A/P unremarkable for acute bleed.  Bloody secretions now resolved.  Hgb 6.6 today to get 1 unit PRBCs     Plan:   - Phase 2 weans: TM/PMV day.  TM/cuff up at night  - Right thora and paracentesis today - labs ordered.  Ok to send with PMV on but would send syringe in case staff would need to remove PMV and inflate cuff if decompensates   - blood transfusion for Hgb 6.6 today - [facility] ordered   - Bronchial hygiene with Albuterol + mucomyst nebs QID.     - VFSS likely tomorrow  - Repeat chest imaging ~1/23 before consider stopping Fluconazole      Henry Fenton, SHAWN  Pulmonary Medicine  [facility]  Pager [phone]    Subjective/Interim Events:   - denies sob, n/v, fever, chills.  He wants to start drinking water, explained we need to do a video swallow test first and he demonstrated understanding    - patient received 1L bolus of fluid overnight for lactic acid 2.3.  Hgb 6.7 o/n and 6.6 this morning.    - remains on trach dome and tolerating well     Tracheal secretions:  Overnight - x4, sm/lg, thick  Yesterday - x3, sm/mod, thick    Cough strength: weak/moderate    Current phase of ventilator weaning pathway:  Phase 2    Ventilator weaning results   1/8: TM for 17hr 53min  (tolerated well  1/9: 30L/30%TM for 19hr 25min.  PMV for 2hr x2  1/10: continuous TM.  PMV for 11hr    1/11: TM for 11hr 50min, PMV for 9hr 38min     Clinical status discussed today with respiratory therapist, patient    Medications:       dextrose       - MEDICATION INSTRUCTIONS -         sodium chloride 0.9%  300 mL Hemodialysis Machine During Dialysis/CRRT (from stock)     acetaminophen  975 mg Per Feeding Tube Q8H BIJU    Or     acetaminophen  650 mg Rectal Q8H BIJU     acetylcysteine  2 mL Nebulization 4x daily     albuterol  2.5 mg Nebulization 4x daily     [Held by provider] apixaban ANTICOAGULANT  5 mg Oral or Feeding Tube BID     carboxymethylcellulose PF  1 drop Both Eyes TID     chlorhexidine  15 mL Mouth/Throat BID     epoetin mirta-epbx  40,000 Units Subcutaneous Weekly     fluconazole  200 mg Per Feeding Tube At Bedtime     folic acid  1 mg Oral or Feeding Tube Daily     heparin Lock (1000 units/mL High concentration)  1,900 Units Intracatheter During Dialysis/CRRT (from stock)     heparin Lock (1000 units/mL High concentration)  1,900 Units Intracatheter During Dialysis/CRRT (from stock)     insulin aspart  1-6 Units Subcutaneous Daily     insulin glargine  20 Units Subcutaneous Daily     lacosamide  100 mg Oral or Feeding Tube BID     lactulose  20 g Oral or Feeding Tube BID     levETIRAcetam  1,000 mg Oral or Feeding Tube Q24H     Melatonin  6 mg Oral or NG Tube At Bedtime     midodrine  10 mg Oral During Dialysis/CRRT (from stock)     midodrine  5 mg Oral or Feeding Tube TID w/meals     mineral oil-hydrophilic petrolatum   Topical Daily     multivitamin RENAL  1 tablet Per Feeding Tube Daily     nystatin  500,000 Units Swish & Swallow 4x Daily     pantoprazole  40 mg Per Feeding Tube BID     QUEtiapine  75 mg Oral or Feeding Tube At Bedtime     rifaximin  550 mg Per Feeding Tube BID     sodium chloride (PF)  10-30 mL Intracatheter Q8H     tacrolimus  1 mg Oral or Feeding Tube BID         Exam/Data:    Vitals  /62 (BP Location: Left arm)   Pulse 106   Temp 98.1  F (36.7  C) (Axillary)   Resp 24   Wt 89.3 kg (196 lb 14.4 oz)   SpO2 96%   BMI 26.70 kg/m       I/O last 3 completed shifts:  In: 3064 [I.V.:1000; Other:400; NG/GT:1664]  Out: 3400 [Other:2400; Stool:1000]  Weight change: -2.087 kg (-4 lb 9.6 oz)    FiO2 (%): 30 %  Resp: 24      EXAM:  Gen: no distress lying in bed   HEENT: NT, trach midline/intact, PMV on, no stridor  CV: RRR  Resp: CTAB after suctioning; non-labored   Abd: large, distended, soft, mildly tender, BS hypoactive  Skin: no visible rashes or lesions, scattered ecchymosis, fragile   Ext: mild pedal edema, weak  Neuro: alert, follows commands, mildly confused     ROS:  A 10-system review was obtained and is negative with the exception of the symptoms noted above.    Labs:  Basic Metabolic Panel  Recent Labs   Lab 01/12/23  0625 01/12/23  0621 01/12/23  0442 01/12/23  0003 01/11/23  0635 01/11/23  0443 01/10/23  0608 01/09/23  1824 01/09/23  0656   NA  --  136 137 135* 137   < > 135*   < > 134*   POTASSIUM  --  3.5 3.4* 3.6 3.5   < > 3.9   < > 3.7   CHLORIDE  --  99  --   --   --   --  96*  --  94*   CO2  --  27  --   --   --   --  28  --  27   BUN  --  45.6*  --   --   --   --  50.5*  --  76.0*   CR  --  2.08*  --   --   --   --  2.28*  --  3.14*   * 152* 152* 140* 151*   < > 131*   < > 159*    < > = values in this interval not displayed.     CBC RESULTS: Recent Labs   Lab Test 01/09/23  0656 01/05/23  1442 01/04/23  0623 12/27/22  0420   WBC  --   --   --  7.5   RBC  --   --   --  2.76*   HGB 7.3*   < > 6.3* 8.8*   HCT  --   --   --  29.4*   MCV  --   --   --  107*   MCH  --   --   --  31.9   MCHC  --   --   --  29.9*   RDW  --   --   --  18.7*   PLT  --   --  58* 63*    < > = values in this interval not displayed.      Venous Blood Gas  Recent Labs   Lab 01/12/23  0442 01/12/23  0003 01/11/23  0635 01/11/23  0443   PHV 7.40 7.45 7.41 7.41   PCO2V 49 44 50 48   PO2V 34 29  34 41   HCO3V 29 29 30 29   MITA 5.5* 6.7* 6.9* 6.4*        RADIOLOGY: Personally reviewed; radiology read below   CTA CHEST ABDOMEN PELVIS W CONTRAST 1/6/2023                                                                   IMPRESSION:  1. No obvious active bleeding identified.  2. Large amount of abdominal/pelvic ascites, splenomegaly, and scattered varicosities. The above findings would be most typical for sequelae of portal venous hypertension.  3. Significant stranding of the omental fat and intraperitoneal fat, this is nonspecific, but may be reactive or neoplastic in nature.  4. Percutaneous gastrostomy tube, tracheostomy, and the rectal tube appear to be in good position.  5. Central catheter with tip in the inferior right atrium.  6. Mild to moderate right pleural effusion and mild left pleural effusion, both of these appear to be loculated given the peripheral enhancement. Presumed sequelae of prior hematoma seen in the right lung.  7. Minute amount of gas seen associated with the left pleural effusion and this is presumed to be iatrogenic in nature.  8. Slight filling defect within the lower trachea most typical for slight mucous.  9. Enlargement of the main pulmonary artery presumed to represent sequelae of pulmonary arterial hypertension.   ----------------  CTA NECK WITH CONTRAST 1/6/2023     INDICATION: Tracheostomy in place, worsening bloody tracheal secretions, rule out tracheoinnominate fistula.                                                                   IMPRESSION:   1.  No significant stenosis or dissection.  2.  No definite findings to suggest a tracheoinnominate artery fistula.  ----------------------  XR CHEST  1/4/2023                                                                   IMPRESSION:      Right PICC terminates in the middle third of the SVC. Tunneled right jugular approach dialysis catheter terminates in the right atrium. Tracheostomy resides within the tracheal air  column.     Persistent shallow lung inflation. Territories of bibasilar atelectasis are similar. Small right pleural effusion visible in the right lateral sulcus and layering into the interlobar fissures is similar. No enlarging pleural effusion. No pneumothorax.     Unchanged heart and mediastinal contours. Unchanged oblique interface just lateral to the dialysis catheter/SVC and corresponds to rounded atelectasis on prior CT.

## 2023-01-12 NOTE — PROGRESS NOTES
Note created for Utilization Review purposes only   01/11/23 4924   Appointment Info   Signing Clinician's Name / Credentials (PT) Kb Chapman, PT   Therapeutic Procedure/Exercise   Ther. Procedure: strength, endurance, ROM, flexibillity Minutes (04055) 23   Symptoms Noted During/After Treatment fatigue   Treatment Detail/Skilled Intervention Pt. up in chair.  Nicolasa med set up with total A.  Pt. on nicolasa med for 13 minutes with 2 pauses.  Of 13 minutes on gear 1 he was actively peddling 50% of time.  Wife present and happy to see home workinging   PT Discharge Planning   PT Plan LE Strengthening, sitting balance, transfer training, standing frame   PT Discharge Recommendation (DC Rec) Acute Rehab Center-Motivated patient will benefit from intensive, interdisciplinary therapy.  Anticipate will be able to tolerate 3 hours of therapy per day;Transitional Care Facility   PT Rationale for DC Rec Pt was IND with mobility, working FT prior to hospitalization. Pt is motivated and has support wife. Pt would benefit from PT to improve IND with transfers and gait and activity tolerance to progress towards discharge   PT Brief overview of current status Pt. up in chair, wife in room.  Pt. agreeable to nicolasa med.  By end of session pt. very fatigued   Total Session Time   Timed Code Treatment Minutes 23   Total Session Time (sum of timed and untimed services) 23

## 2023-01-12 NOTE — PLAN OF CARE
Problem: Pain Acute  Goal: Optimal Pain Control and Function  Outcome: Progressing  Intervention: Prevent or Manage Pain  Recent Flowsheet Documentation  Taken 1/12/2023 0038 by Keyla Adorno RN  Medication Review/Management: medications reviewed   Goal Outcome Evaluation:       Patient was calm and cooperative with cares, sepsis fired at the beginning of the shift, was drawn and result read 2.3, on call  was informed and he ordered Bolus to be given and recheck lab  @ 0430, lab was recheck lactic acid read 1.5 and HGB  was 6.7,  Dr was updated by the [facility], he ordered  Type and screen lab to be done and HB  recheck AT 0600, that was done and patient had critical  HGB 6.6 this AM,  was notified , blood transfusion  1 unit was ordered,  Am nurse to follow up.

## 2023-01-12 NOTE — SIGNIFICANT EVENT
"Significant Event Note    Time of event: 12:16 AM January 12, 2023    Description of event:  BPA sepsis fired  Lactic acid 2.3    Vital signs:  Temp: 99.3  F (37.4  C) Temp src: Oral BP: 94/53 Pulse: 119   Resp: 22 SpO2: 97 % O2 Device: Trach dome Oxygen Delivery: 30 LPM   Weight: 91.4 kg (201 lb 8 oz)  Estimated body mass index is 27.33 kg/m  as calculated from the following:    Height as of 11/12/22: 1.829 m (6').    Weight as of this encounter: 91.4 kg (201 lb 8 oz).    General feeling\" crummy\". No specific complaints.     Underwent dialysis today.           Plan:  -NS bolus 1 L / 4 hours  -Recheck Lactic Acid at 430am   -Closely monitor     Discussed with: PETER Schmidt MD    "

## 2023-01-12 NOTE — PLAN OF CARE
Problem: Diarrhea  Goal: Effective Diarrhea Management  Outcome: Not Progressing     Problem: Anemia  Goal: Anemia Symptom Improvement  Outcome: Progressing  Intervention: Monitor and Manage Anemia  Recent Flowsheet Documentation  Taken 1/12/2023 0825 by Imelda Mercado RN  Safety Promotion/Fall Prevention: assistive device/personal items within reach     Problem: Mechanical Ventilation Invasive  Goal: Effective Communication  Intervention: Ensure Effective Communication  Recent Flowsheet Documentation  Taken 1/12/2023 0825 by Imelda Mercado RN  Trust Relationship/Rapport: care explained  Goal: Optimal Device Function  Intervention: Optimize Device Care and Function  Recent Flowsheet Documentation  Taken 1/12/2023 0830 by Imelda Mercado RN  Oral Care:   swabbed with antiseptic solution   oral rinse provided   suction provided  Goal: Absence of Ventilator-Induced Lung Injury  Intervention: Prevent Ventilator-Associated Pneumonia  Recent Flowsheet Documentation  Taken 1/12/2023 0830 by Imelda Mercado RN  Oral Care:   swabbed with antiseptic solution   oral rinse provided   suction provided     Problem: Plan of Care - These are the overarching goals to be used throughout the patient stay.    Goal: Absence of Hospital-Acquired Illness or Injury  Intervention: Identify and Manage Fall Risk  Recent Flowsheet Documentation  Taken 1/12/2023 0825 by Imelda Mercado RN  Safety Promotion/Fall Prevention: assistive device/personal items within reach  Intervention: Prevent Infection  Recent Flowsheet Documentation  Taken 1/12/2023 0825 by Imelda Mercado RN  Infection Prevention: equipment surfaces disinfected  Goal: Optimal Comfort and Wellbeing  Intervention: Monitor Pain and Promote Comfort  Recent Flowsheet Documentation  Taken 1/12/2023 0810 by Imelda Mercado RN  Pain Management Interventions: medication (see MAR)  Intervention: Provide Person-Centered  Care  Recent Flowsheet Documentation  Taken 1/12/2023 0825 by Imelda Mercado RN  Trust Relationship/Rapport: care explained     Problem: Pain Acute  Goal: Optimal Pain Control and Function  Intervention: Develop Pain Management Plan  Recent Flowsheet Documentation  Taken 1/12/2023 0810 by Imelda Mercado RN  Pain Management Interventions: medication (see MAR)     Problem: Enteral Nutrition  Goal: Absence of Aspiration Signs and Symptoms  Intervention: Minimize Aspiration Risk  Recent Flowsheet Documentation  Taken 1/12/2023 0830 by Imelda Mercado RN  Oral Care:   swabbed with antiseptic solution   oral rinse provided   suction provided     Problem: Restraint, Nonviolent  Goal: Absence of Harm or Injury  Intervention: Protect Dignity, Rights and Personal Wellbeing  Recent Flowsheet Documentation  Taken 1/12/2023 0825 by Imelda Mercado RN  Trust Relationship/Rapport: care explained     Problem: Chronic Kidney Disease  Goal: Acceptable Pain Control  Intervention: Prevent or Manage Pain  Recent Flowsheet Documentation  Taken 1/12/2023 0810 by Imelda Mercado RN  Pain Management Interventions: medication (see MAR)     Problem: Hemodialysis  Goal: Absence of Infection Signs and Symptoms  Intervention: Prevent or Manage Infection  Recent Flowsheet Documentation  Taken 1/12/2023 0825 by Imelda Mercado RN  Infection Prevention: equipment surfaces disinfected     Problem: Artificial Airway  Goal: Effective Communication  Intervention: Ensure Effective Communication  Recent Flowsheet Documentation  Taken 1/12/2023 0825 by Imelda Mercado RN  Trust Relationship/Rapport: care explained  Goal: Optimal Device Function  Intervention: Optimize Device Care and Function  Recent Flowsheet Documentation  Taken 1/12/2023 0830 by Imelda Mercado RN  Oral Care:   swabbed with antiseptic solution   oral rinse provided   suction provided     Problem: Mechanical  Ventilation Invasive  Goal: Effective Communication  Intervention: Ensure Effective Communication  Recent Flowsheet Documentation  Taken 1/12/2023 0825 by Imelda Mercado RN  Trust Relationship/Rapport: care explained  Goal: Optimal Device Function  Intervention: Optimize Device Care and Function  Recent Flowsheet Documentation  Taken 1/12/2023 0830 by Imelda Mercado RN  Oral Care:   swabbed with antiseptic solution   oral rinse provided   suction provided  Goal: Absence of Ventilator-Induced Lung Injury  Intervention: Prevent Ventilator-Associated Pneumonia  Recent Flowsheet Documentation  Taken 1/12/2023 0830 by Imelda Mercado RN  Oral Care:   swabbed with antiseptic solution   oral rinse provided   suction provided   Goal Outcome Evaluation:       Patient is alert, has a trach tube, on trach dome, with speaking valve this morning, encouraged to cough, needed occasional trach suction for moderate thick pale/yellow secretions. Complained of abdominal discomfort, abdomen is very distended and tender to touch, was scheduled for paracentesis today, but cancelled due to conflict with blood transfusion for low hemoglobin of 6.6. Transfused 1 unit of red blood cells, vital signs stable prior, during, and post transfusion. Given scheduled tylenol, assisted with mechanical transfer to chair this afternoon. Tolerating activities, without respiratory distress.

## 2023-01-12 NOTE — PROGRESS NOTES
[facility]  WOC Nurse Inpatient Assessment     Consulted for: trach site, peg tube site    Patient History (according to provider note(s):      Per ICU H+P at previous facility:  57 year old male with paroxysmal A. fib, Liver transplant (2016), CHRISTIAN on BiPAP, h/o CVA and hypertension. Patient  s/p Trach/PEG following acu had Rt sided Chest tube placed for hydroptx on 12/12/22 and  transferred to [facility] on 12/14/22 for CIP.  Pt  noted to have bloody stool on 12/15 and 12/16 and had left Ptx which required left sided chest tube leading to ~900ml of bloody output. Patient with PEA arrest 12/20/22 and then seizure while on HD.  Overall stable and getting close to being ready for transfer back to LTAC.  The following problems were addressed during his hospitalization listed by organ system.     Areas Assessed:      Areas visualized during today's visit: Abdomen and Under/around medical device(s): trach, PEG    Skin Injury Location: presumed concern at PEG tube site  Last photo: NA  GT no concerns, intact    Trach site no concerns, continue Routine cares     Patient continues to have copious amount of ecchymosis on upper body, including shoulders and chest and back, left arm/elbow, purpura noted on abdomen     groin folds dry, resolved      Attention to Internal Fecal Management System   denudement resolved  Continue Viscopaste     Internal Fecal Management System Instructions: Record output and inspect skin at anal opening every shift. **Discontinue Internal Fecal Management System  if less than 200cc of output in a 24 hour period.**    WOC RN will sign off, wound/ostomy treatment plan in place. Please re-consult with any new concerns.     Treatment Plan:     See above    Orders: Reviewed    RECOMMEND PRIMARY TEAM ORDER: None, at this time  Education provided: plan of care  Discussed plan of care with: Patient, Family and Nurse  WOC nurse follow-up plan: signing off  Notify WOC if wound(s)  deteriorate.  Nursing to notify the Provider(s) and re-consult the WOC Nurse if new skin concern.    DATA:     Current support surface: Standard  Low air loss (MIQUEL pump, Isolibrium, Pulsate, skin guard, etc)  Containment of urine/stool: Internal fecal management  BMI: Body mass index is 26.7 kg/m .   Active diet order: None     Output: I/O last 3 completed shifts:  In: 3064 [I.V.:1000; Other:400; NG/GT:1664]  Out: 3400 [Other:2400; Stool:1000]     Labs:   Recent Labs   Lab 01/12/23  0621   ALBUMIN 2.1*   HGB 6.6*   WBC 4.7     Pressure injury risk assessment:   Sensory Perception: 2-->very limited  Moisture: 3-->occasionally moist  Activity: 2-->chairfast  Mobility: 2-->very limited  Nutrition: 2-->probably inadequate  Friction and Shear: 2-->potential problem  Kareem Score: 13    Ashleigh Pereira, BSN, RN, PHN, HNB-BC, CWOCN

## 2023-01-12 NOTE — PROGRESS NOTES
Hemodialysis Progress Note:     Pre weight: 91.4 kg  Post weight: 89.4 kg      Assessment: Pt with trach, alert and able to make needs known. Brother visiting.     Access: Right CVC dressing CDI, no s/s of infection noted. No issues with aspirating or flushing lumens. Heparin locked, caps changed and lumens wrapped in gauze post tx. Dressing changed.      UF Goal: 2400 ml     Net Fluid Removed: 2000 ml     Dialyzer: [id number] with clear rinse post. No heparin used this tx.      Run Summary: Ran on K3 bath. . BFR maintained at 300-350 d/t arterial collapsing possibly d/t positioning. Pt c/o buttock pain off and on during HD, assisted staff with repositioning. PRN given by primary RN. Pt remained hemodynamically stable throughout tx. Seen by JEANCARLOS CHRISTY prior to tx. Post report given to PETER GUIDO      Plan: Per renal team     *Pt prefers to have HD in the evening as it helps him sleep better.

## 2023-01-12 NOTE — PROGRESS NOTES
Social Work Note:  Patient chart reviewed.  Patient discussed in morning rounds.  Barriers to discharge:   * Trach.  Phase  2 30%/30L TM.  Phase 2 weans: TM/PMV day.  TM/cuff up at night  * Rectal Tube  * tele monitor  * WOC-bilat feet.  * Scheduled today:  right thoracentesis and paracentesis.     Patient here with Trach, Nebs:Duo/Muco QID, rectal tube, tele monitor, WOC, and feeding tube.  Patient's exact discharge date, time, disposition TBD  SW will continue to follow for psychosocial and emotional support of patient and family. SW to facilitate discharge to TCU when pt no longer requires LTACH level of care        Kb Liu, [facility] LTJAYLYN/[place]  [phone]

## 2023-01-13 ENCOUNTER — APPOINTMENT (OUTPATIENT)
Dept: ULTRASOUND IMAGING | Facility: HOSPITAL | Age: 58
End: 2023-01-13
Attending: HOSPITALIST
Payer: COMMERCIAL

## 2023-01-13 ENCOUNTER — ANCILLARY PROCEDURE (OUTPATIENT)
Dept: GENERAL RADIOLOGY | Facility: CLINIC | Age: 58
DRG: 207 | End: 2023-01-13
Attending: HOSPITALIST
Payer: COMMERCIAL

## 2023-01-13 ENCOUNTER — APPOINTMENT (OUTPATIENT)
Dept: OCCUPATIONAL THERAPY | Facility: CLINIC | Age: 58
DRG: 207 | End: 2023-01-13
Attending: HOSPITALIST
Payer: COMMERCIAL

## 2023-01-13 ENCOUNTER — APPOINTMENT (OUTPATIENT)
Dept: SPEECH THERAPY | Facility: CLINIC | Age: 58
DRG: 207 | End: 2023-01-13
Attending: HOSPITALIST
Payer: COMMERCIAL

## 2023-01-13 LAB
% LINING CELLS, BODY FLUID: 2 %
ABSOLUTE NEUTROPHILS, BODY FLUID: 28.8 /UL
ALBUMIN BODY FLUID SOURCE: NORMAL
ALBUMIN FLD-MCNC: 0.5 G/DL
APPEARANCE FLD: ABNORMAL
APPEARANCE FLD: ABNORMAL
BASE EXCESS BLDV CALC-SCNC: 7.7 MMOL/L (ref -8.1–1.9)
CA-I BLD-MCNC: 1.14 MMOL/L (ref 1.11–1.3)
CELL COUNT BODY FLUID SOURCE: ABNORMAL
CELL COUNT BODY FLUID SOURCE: ABNORMAL
COLOR FLD: ABNORMAL
COLOR FLD: YELLOW
GLUCOSE BLD-MCNC: 112 MG/DL (ref 70–125)
GLUCOSE BLDC GLUCOMTR-MCNC: 112 MG/DL (ref 70–99)
GLUCOSE BODY FLUID SOURCE: NORMAL
GLUCOSE FLD-MCNC: 2 MG/DL
GRAM STAIN RESULT: NORMAL
GRAM STAIN RESULT: NORMAL
HBV SURFACE AG SERPL QL IA: NONREACTIVE
HCO3 BLDV-SCNC: 30 MMOL/L (ref 24–30)
HGB BLD-MCNC: 9 G/DL (ref 13.3–17.7)
LACTATE BLD-SCNC: 1.1 MMOL/L (ref 0.7–2)
LD BODY BODY FLUID SOURCE: NORMAL
LDH FLD L TO P-CCNC: >2500 U/L
LDH SERPL L TO P-CCNC: 217 U/L (ref 0–250)
LYMPHOCYTES NFR FLD MANUAL: 42 %
LYMPHOCYTES NFR FLD MANUAL: 7 %
MONOS+MACROS NFR FLD MANUAL: 40 %
MONOS+MACROS NFR FLD MANUAL: NORMAL %
NEUTS BAND NFR FLD MANUAL: 16 %
NEUTS BAND NFR FLD MANUAL: 93 %
PCO2 BLDV: 52 MM HG (ref 35–50)
PH BLDV: 7.41 [PH] (ref 7.35–7.45)
PH BODY FLUID SOURCE: NORMAL
PH FLD: 7.5 PH
PHOSPHATE SERPL-MCNC: 2.8 MG/DL (ref 2.5–4.5)
PO2 BLDV: 29 MM HG (ref 25–47)
POTASSIUM BLD-SCNC: 3.7 MMOL/L (ref 3.5–5)
PROCALCITONIN SERPL IA-MCNC: 1.81 NG/ML
PROT FLD-MCNC: 1.4 G/DL
PROT FLD-MCNC: 3.1 G/DL
PROT SERPL-MCNC: 6.5 G/DL (ref 6.4–8.3)
PROTEIN BODY FLUID SOURCE: NORMAL
PROTEIN BODY FLUID SOURCE: NORMAL
RBC # FLD: 96 /UL
RBC # FLD: ABNORMAL /UL
SATV LHE POCT: 53.7 % (ref 70–75)
SODIUM BLD-SCNC: 136 MMOL/L (ref 136–145)
WBC # FLD AUTO: 180 /UL
WBC # FLD AUTO: 7918 /UL

## 2023-01-13 PROCEDURE — 90935 HEMODIALYSIS ONE EVALUATION: CPT

## 2023-01-13 PROCEDURE — 92611 MOTION FLUOROSCOPY/SWALLOW: CPT | Mod: GN | Performed by: SPEECH-LANGUAGE PATHOLOGIST

## 2023-01-13 PROCEDURE — 87075 CULTR BACTERIA EXCEPT BLOOD: CPT | Performed by: NURSE PRACTITIONER

## 2023-01-13 PROCEDURE — 99232 SBSQ HOSP IP/OBS MODERATE 35: CPT | Performed by: NURSE PRACTITIONER

## 2023-01-13 PROCEDURE — 82330 ASSAY OF CALCIUM: CPT

## 2023-01-13 PROCEDURE — 85025 COMPLETE CBC W/AUTO DIFF WBC: CPT | Performed by: HOSPITALIST

## 2023-01-13 PROCEDURE — 82042 OTHER SOURCE ALBUMIN QUAN EA: CPT | Performed by: HOSPITALIST

## 2023-01-13 PROCEDURE — 250N000012 HC RX MED GY IP 250 OP 636 PS 637: Performed by: HOSPITALIST

## 2023-01-13 PROCEDURE — 250N000013 HC RX MED GY IP 250 OP 250 PS 637: Performed by: HOSPITALIST

## 2023-01-13 PROCEDURE — 99232 SBSQ HOSP IP/OBS MODERATE 35: CPT | Performed by: HOSPITALIST

## 2023-01-13 PROCEDURE — 97535 SELF CARE MNGMENT TRAINING: CPT | Mod: GO | Performed by: OCCUPATIONAL THERAPIST

## 2023-01-13 PROCEDURE — 94640 AIRWAY INHALATION TREATMENT: CPT | Mod: 76

## 2023-01-13 PROCEDURE — 87075 CULTR BACTERIA EXCEPT BLOOD: CPT | Performed by: HOSPITALIST

## 2023-01-13 PROCEDURE — 999N000157 HC STATISTIC RCP TIME EA 10 MIN

## 2023-01-13 PROCEDURE — 88112 CYTOPATH CELL ENHANCE TECH: CPT | Mod: TC | Performed by: NURSE PRACTITIONER

## 2023-01-13 PROCEDURE — 74230 X-RAY XM SWLNG FUNCJ C+: CPT

## 2023-01-13 PROCEDURE — 87070 CULTURE OTHR SPECIMN AEROBIC: CPT | Performed by: HOSPITALIST

## 2023-01-13 PROCEDURE — 0W993ZZ DRAINAGE OF RIGHT PLEURAL CAVITY, PERCUTANEOUS APPROACH: ICD-10-PCS | Performed by: RADIOLOGY

## 2023-01-13 PROCEDURE — 250N000011 HC RX IP 250 OP 636: Performed by: NURSE PRACTITIONER

## 2023-01-13 PROCEDURE — 87206 SMEAR FLUORESCENT/ACID STAI: CPT | Performed by: NURSE PRACTITIONER

## 2023-01-13 PROCEDURE — 84155 ASSAY OF PROTEIN SERUM: CPT | Performed by: NURSE PRACTITIONER

## 2023-01-13 PROCEDURE — 250N000009 HC RX 250: Performed by: NURSE PRACTITIONER

## 2023-01-13 PROCEDURE — 272N000710 US THORACENTESIS

## 2023-01-13 PROCEDURE — 0W9G3ZZ DRAINAGE OF PERITONEAL CAVITY, PERCUTANEOUS APPROACH: ICD-10-PCS | Performed by: RADIOLOGY

## 2023-01-13 PROCEDURE — 84157 ASSAY OF PROTEIN OTHER: CPT | Performed by: NURSE PRACTITIONER

## 2023-01-13 PROCEDURE — 999N000123 HC STATISTIC OXYGEN O2DAILY TECH TIME

## 2023-01-13 PROCEDURE — 87205 SMEAR GRAM STAIN: CPT | Performed by: NURSE PRACTITIONER

## 2023-01-13 PROCEDURE — 250N000013 HC RX MED GY IP 250 OP 250 PS 637: Performed by: INTERNAL MEDICINE

## 2023-01-13 PROCEDURE — 84100 ASSAY OF PHOSPHORUS: CPT | Performed by: HOSPITALIST

## 2023-01-13 PROCEDURE — 84157 ASSAY OF PROTEIN OTHER: CPT | Performed by: HOSPITALIST

## 2023-01-13 PROCEDURE — 89051 BODY FLUID CELL COUNT: CPT | Performed by: NURSE PRACTITIONER

## 2023-01-13 PROCEDURE — 82803 BLOOD GASES ANY COMBINATION: CPT

## 2023-01-13 PROCEDURE — 83615 LACTATE (LD) (LDH) ENZYME: CPT | Performed by: NURSE PRACTITIONER

## 2023-01-13 PROCEDURE — 250N000009 HC RX 250: Performed by: HOSPITALIST

## 2023-01-13 PROCEDURE — 82945 GLUCOSE OTHER FLUID: CPT | Performed by: NURSE PRACTITIONER

## 2023-01-13 PROCEDURE — 89051 BODY FLUID CELL COUNT: CPT | Performed by: HOSPITALIST

## 2023-01-13 PROCEDURE — 120N000017 HC R&B RESPIRATORY CARE

## 2023-01-13 PROCEDURE — 999N000009 HC STATISTIC AIRWAY CARE

## 2023-01-13 PROCEDURE — 258N000003 HC RX IP 258 OP 636: Performed by: NURSE PRACTITIONER

## 2023-01-13 PROCEDURE — 99231 SBSQ HOSP IP/OBS SF/LOW 25: CPT | Performed by: PHYSICIAN ASSISTANT

## 2023-01-13 PROCEDURE — 87205 SMEAR GRAM STAIN: CPT | Performed by: HOSPITALIST

## 2023-01-13 PROCEDURE — 250N000013 HC RX MED GY IP 250 OP 250 PS 637: Performed by: NURSE PRACTITIONER

## 2023-01-13 PROCEDURE — 94640 AIRWAY INHALATION TREATMENT: CPT

## 2023-01-13 PROCEDURE — 71045 X-RAY EXAM CHEST 1 VIEW: CPT

## 2023-01-13 PROCEDURE — 84145 PROCALCITONIN (PCT): CPT | Performed by: HOSPITALIST

## 2023-01-13 PROCEDURE — 49083 ABD PARACENTESIS W/IMAGING: CPT

## 2023-01-13 PROCEDURE — 92526 ORAL FUNCTION THERAPY: CPT | Mod: GN | Performed by: SPEECH-LANGUAGE PATHOLOGIST

## 2023-01-13 PROCEDURE — 999N000253 HC STATISTIC WEANING TRIALS

## 2023-01-13 PROCEDURE — 88305 TISSUE EXAM BY PATHOLOGIST: CPT | Mod: TC | Performed by: NURSE PRACTITIONER

## 2023-01-13 PROCEDURE — 87102 FUNGUS ISOLATION CULTURE: CPT | Performed by: NURSE PRACTITIONER

## 2023-01-13 PROCEDURE — 87040 BLOOD CULTURE FOR BACTERIA: CPT | Performed by: HOSPITALIST

## 2023-01-13 PROCEDURE — 83986 ASSAY PH BODY FLUID NOS: CPT | Performed by: NURSE PRACTITIONER

## 2023-01-13 RX ORDER — BARIUM SULFATE 400 MG/ML
SUSPENSION ORAL ONCE
Status: COMPLETED | OUTPATIENT
Start: 2023-01-13 | End: 2023-01-13

## 2023-01-13 RX ADMIN — NYSTATIN 500000 UNITS: 100000 SUSPENSION ORAL at 08:35

## 2023-01-13 RX ADMIN — ALBUTEROL SULFATE 2.5 MG: 2.5 SOLUTION RESPIRATORY (INHALATION) at 19:56

## 2023-01-13 RX ADMIN — BARIUM SULFATE 120 ML: 400 SUSPENSION ORAL at 10:34

## 2023-01-13 RX ADMIN — ALBUTEROL SULFATE 2.5 MG: 2.5 SOLUTION RESPIRATORY (INHALATION) at 15:28

## 2023-01-13 RX ADMIN — ALBUTEROL SULFATE 2.5 MG: 2.5 SOLUTION RESPIRATORY (INHALATION) at 07:00

## 2023-01-13 RX ADMIN — SODIUM CHLORIDE 300 ML: 9 INJECTION, SOLUTION INTRAVENOUS at 15:30

## 2023-01-13 RX ADMIN — Medication 1 DROP: at 14:21

## 2023-01-13 RX ADMIN — LACOSAMIDE 100 MG: 10 SOLUTION ORAL at 08:35

## 2023-01-13 RX ADMIN — BARIUM SULFATE 120 ML: 400 SUSPENSION ORAL at 10:37

## 2023-01-13 RX ADMIN — RIFAXIMIN 550 MG: 550 TABLET ORAL at 08:35

## 2023-01-13 RX ADMIN — ACETAMINOPHEN 975 MG: 325 TABLET, FILM COATED ORAL at 14:20

## 2023-01-13 RX ADMIN — NYSTATIN 500000 UNITS: 100000 SUSPENSION ORAL at 17:20

## 2023-01-13 RX ADMIN — HEPARIN SODIUM 1900 UNITS: 1000 INJECTION INTRAVENOUS; SUBCUTANEOUS at 18:35

## 2023-01-13 RX ADMIN — Medication 6 MG: at 20:17

## 2023-01-13 RX ADMIN — Medication 40 MG: at 20:46

## 2023-01-13 RX ADMIN — FOLIC ACID 1 MG: 1 TABLET ORAL at 08:35

## 2023-01-13 RX ADMIN — MIDODRINE HYDROCHLORIDE 5 MG: 5 TABLET ORAL at 14:19

## 2023-01-13 RX ADMIN — Medication 40 MG: at 08:35

## 2023-01-13 RX ADMIN — LACOSAMIDE 100 MG: 10 SOLUTION ORAL at 20:17

## 2023-01-13 RX ADMIN — LACTULOSE 20 G: 20 SOLUTION ORAL at 20:16

## 2023-01-13 RX ADMIN — MIDODRINE HYDROCHLORIDE 5 MG: 5 TABLET ORAL at 17:20

## 2023-01-13 RX ADMIN — ACETAMINOPHEN 975 MG: 325 TABLET, FILM COATED ORAL at 06:15

## 2023-01-13 RX ADMIN — Medication 1 DROP: at 08:36

## 2023-01-13 RX ADMIN — MIDODRINE HYDROCHLORIDE 10 MG: 5 TABLET ORAL at 15:31

## 2023-01-13 RX ADMIN — TACROLIMUS 1 MG: 5 CAPSULE ORAL at 18:08

## 2023-01-13 RX ADMIN — Medication 1 TABLET: at 08:42

## 2023-01-13 RX ADMIN — LEVETIRACETAM 1000 MG: 100 SOLUTION ORAL at 20:15

## 2023-01-13 RX ADMIN — QUETIAPINE 75 MG: 25 TABLET ORAL at 20:16

## 2023-01-13 RX ADMIN — CHLORHEXIDINE GLUCONATE 0.12% ORAL RINSE 15 ML: 1.2 LIQUID ORAL at 08:36

## 2023-01-13 RX ADMIN — ACETYLCYSTEINE 2 ML: 200 SOLUTION ORAL; RESPIRATORY (INHALATION) at 10:35

## 2023-01-13 RX ADMIN — CHLORHEXIDINE GLUCONATE 0.12% ORAL RINSE 15 ML: 1.2 LIQUID ORAL at 20:15

## 2023-01-13 RX ADMIN — ACETAMINOPHEN 975 MG: 325 TABLET, FILM COATED ORAL at 22:27

## 2023-01-13 RX ADMIN — TACROLIMUS 1 MG: 5 CAPSULE ORAL at 08:35

## 2023-01-13 RX ADMIN — INSULIN GLARGINE 20 UNITS: 100 INJECTION, SOLUTION SUBCUTANEOUS at 08:36

## 2023-01-13 RX ADMIN — ACETYLCYSTEINE 2 ML: 200 SOLUTION ORAL; RESPIRATORY (INHALATION) at 07:01

## 2023-01-13 RX ADMIN — ACETYLCYSTEINE 2 ML: 200 SOLUTION ORAL; RESPIRATORY (INHALATION) at 19:56

## 2023-01-13 RX ADMIN — Medication 1 DROP: at 20:18

## 2023-01-13 RX ADMIN — FLUCONAZOLE 200 MG: 200 TABLET ORAL at 20:46

## 2023-01-13 RX ADMIN — Medication 12.5 MG: at 02:40

## 2023-01-13 RX ADMIN — ACETYLCYSTEINE 2 ML: 200 SOLUTION ORAL; RESPIRATORY (INHALATION) at 15:28

## 2023-01-13 RX ADMIN — NYSTATIN 500000 UNITS: 100000 SUSPENSION ORAL at 20:16

## 2023-01-13 RX ADMIN — LACTULOSE 20 G: 20 SOLUTION ORAL at 08:34

## 2023-01-13 RX ADMIN — RIFAXIMIN 550 MG: 550 TABLET ORAL at 20:16

## 2023-01-13 RX ADMIN — ALBUTEROL SULFATE 2.5 MG: 2.5 SOLUTION RESPIRATORY (INHALATION) at 10:35

## 2023-01-13 RX ADMIN — WHITE PETROLATUM: 1.75 OINTMENT TOPICAL at 08:36

## 2023-01-13 RX ADMIN — NYSTATIN 500000 UNITS: 100000 SUSPENSION ORAL at 14:20

## 2023-01-13 ASSESSMENT — ACTIVITIES OF DAILY LIVING (ADL)
ADLS_ACUITY_SCORE: 66

## 2023-01-13 NOTE — PROGRESS NOTES
[facility]    Medicine Progress Note - Hospitalist Service    Date of Admission:  12/29/2022  Brief history:  Blanco Osborne is an 57 year old male who is transferred from [facility] f to [facility] for  IV antibiotic treatment.  Patient has multiple medical problems including history of liver transplant 2016, PAF on chronic anticoagulation, history of DM2, CVA, HTN, CHRISTIAN on BiPAP, and history of alcohol abuse in the past causing liver damage ending with liver transplant.  About 6 weeks ago he had respiratory arrest and was diagnosed with infection with parainfluenza and strep pneumoniae and acute on chronic renal insufficiency ending with CRF needing 3/week hemodialysis.  During this period he was diagnosed with cirrhosis of transplanted liver and hyperammonia.  Currently he is on Lactulose and Rifaximin.        On 12/14/22, due to CIP, he was transferred to [facility] for the first time with Trach, PEG and Rt chest tube.    On 12/16/2022 patient was transferred back to [facility] due to respiratory insufficiency secondary to hydropneumothorax and drainage of 900 cc bloody pleural effusion, bloody stool and transfusion of 2 units PRBC.      On 12/20/2022 he had another episode of PEA arrest followed by CPR x2 minutes and possibly had seizure activity in [facility]. 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  He has been on ventilator and has improved to trach dome tolerating up to 6 hours so far. He had chest tube which was removed.    He had SBP with growth of lactobacillus and is currently on Unasyn which will be switched to Augmentin orally through 1/12/2023.    Pleural effusion grew Candida and is on micafungin.  He is also on  due to cirrhosis and hyperammonia.  He is anemic and is on Retacrit.   He is on lacosamide and Keppra due to history of seizure.  There is question of him drinking again  causing him cirrhosis of the transplanted liver.  LTAC course:  1/10/2023.  I attended patient care conference earlier today.  Worsening ascites compared to previous days.  Plan for paracentesis per IR tomorrow  1/11/2023.  Dialyzing today.  Plan for paracentesis tomorrow.  Otherwise continue current medical management.  No new changes at this time.  1/12.  Overnight patient had to be resuscitated with 1 L normal saline bolus after BPA sepsis fired noted to have lactic acid of 2.3.  This morning his hemoglobin is 6.6.  Typed screened and now transfusing packed red blood cells.  Paracentesis scheduled for tomorrow  1/13.  Patient had paracentesis and thoracentesis today.  About 7500 cc of ascitic fluid and 50 cc of pleural fluid yielded.  Cytology pending.Was transfused packed red blood cells yesterday most recent hemoglobin is 7.8.    Assessment & Plan   Principal Problem:    Acute on chronic respiratory failure with hypoxia (H)  Active Problems:    Acquired immunocompromised state (H)    Cirrhosis of liver (H)    NAFLD (nonalcoholic fatty liver disease)    Liver transplanted (H)    Immunosuppression (H)    Acute kidney injury (H)    Spontaneous bacterial peritonitis (H)    Critical illness myopathy    History of sudden cardiac arrest, PEA    Dependent on hemodialysis (H)    Acute anemia.  Status post 1 unit PRBCs transfusion 1/12/2023  -Repeat Hb 7.8.  -No evidence of overt bleeding.  -Anemia most likely secondary to critical illness/chronic disease  -Type screen and transfuse packed red blood cells to keep Hb> 7  -Monitor H&H.    Acute now chronic respiratory failure requiring tracheostomy. Initial event was parainfluenza and strep pneumo pneumonia.  Had failed extubation x2 and tracheostomy performed last hospitalization.  Had additional complications of bilateral pneumothoraces.  (Most recently was a hydropneumothorax where fluid grew Candida.. Chest tube was placed and removed . Had decent tolerance for  pressure support and trach dome but after few hours and trach dome 12/27 had a pretty significant episode of desaturation.   Plan:  -Wean ventilator per pulmonology.  -Good pulmonary toilet  -Tracheostomy care per RT    History PEA  Hypotension  PEA arrest prior to his previous admission and 1 episode of PEA associated with desaturation on 12/20.  No structural cardiac disease but does have A. Fib which neurologist thought might have been contributory to his embolic strokes.  Anticoagulation been restarted with stable labs although high risk for bleeding seconday to thrombocytopenia.  Also has developed baseline hypotension and is on midodrine 10 mg 3 times daily.  Plan:  Continue Eliquis.    Continue current midodrine dose     Seizure after hypoxic event.  This is in the context of baseline multifactorial encephalopathy secondary to his ongoing critical illness and prior cardiac arrests and prior strokes and cirrhosis with hepatic encephalopathy. MRI of head of 12/20/22 reveals no PRES or leptomeningeal enhancement but scattered.  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   Plan:  - Continue with  Keppra  indefinitely.  Neurology follow-up.   - Anticoagulation indefinitely for secondary stroke prevention.  On hold since 1/6/2023 due to tracheal bleeding during suction.  Patient is currently in sinus rhythm.  And telemetry and monitored.  - Tylenol TID for headache.        ESRD: Now on iHD.  No return of renal function.  - MWF schedule      Infectious disease  1. 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 4.  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  Have had some conversations within our group and with transplant ID and general infectious disease.  General consensus is that ganciclovir probably could be discontinued  2. H/o Strep/Parainfluenza infection last hospitalization. Completed treatment course  3. 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  4.  Lactobacillus growing in the  broth 4 days after paracentesis.  Cell count was very low.  5.  Candida in left pleural fluid cultures.  Sensitivities not resulted.  6.  Immunosuppressed on tacrolimus.  Discussed with transplant service at the [facility] who felt that given the absence of rejection on his most recent biopsy and ongoing issues with infection continuing with 1 twice daily tacrolimus is the best current option.  They have seen his subtherapeutic trough levels.  Plan:  1.  Confirm with transplant ID whether or not we can stop ganciclovir.  2.  Unasyn for 2 weeks total.  Change to Augmentin through 1/12/2023.  3.  At least 4 weeks of antimicrobial for the Candida.  If sensitive to fluconazole could de-escalate from micafungin.  4.  Continue tacrolimus as above and we are tapering steroids    Post transplant cirrhosis.  Main manifestations of this are hepatic encephalopathy and ascites.  Hepatologist at [facility] felt that most likely reason for the cirrhosis was metabolic syndrome or alcohol intake.  There was no evidence of rejection on biopsy and there was some evidence of regeneration. Paracentesis done on 12/22/2022 with lack of bacillus in the broth indicating SBP low cell count was very low  Plan:  1.    Paracentesis today yielded about 7000 cc.  Cytology studies pending.  Continue intermittent paracentesis as needed.  2.  Tacrolimus 1 mg BID and tapering steroids   3.  Lactulose and Rifaximin as above  4.  Treat SBP for 2 weeks.  5.  He has an appointment in April with Dr. Simms if he is out of the hospital.    Diabetes mellitus type 2  1. DM.  Titrating insulin. He needs supplemental coverage.  2. He is on steroids which we are weaning  Plan:  -Continue with current regimen.   -Hypoglycemic protocol in place    Moderate malnutrition, protein and calorie type.  -Continue with tube feeds via PEG tube  -Nutritionist consulted and following      Pancytopenia due cirrhosis, ESRD and hypersplenism. WBC count has improved.  However he  needs frequent PRBC transfusion due Hb <7.0.   Patient is on anticoagulation for PAF.  Currently in sinus rhythm.  Is off anticoagulation due to tracheal bleeding.  Plan:  1.  Eliquis is on hold for now.     Code Status: Full     Diet: Adult Formula Bolus Feeding: Novasource Renal; Route: Gastrostomy; 3 times daily; Volume per Bolus: 250; mL(s); Additional free water (mL): 100 ml b/4 and after each bolus feeding if given; 125 ml/h x 2 h back up bolus feeding after each meal ONLY...    DVT Prophylaxis: DOAC  Nixon Catheter: Not present  Central Lines: PRESENT      Cardiac Monitoring: ACTIVE order. Indication: Tachyarrhythmias, acute (48 hours)  Code Status: Full Code      Disposition Plan      Expected Discharge Date: 01/31/2023    Discharge Delays: Complex Discharge    Discharge Comments: Vent. Trach. Phase 1. PEG.  Rectal Tube.      The patient's care was discussed with the Bedside Nurse, Patient and Patient's Family.    Yahir Barcenas MD  Hospitalist Service  LTACH  Securely message with the Vocera Web Console (learn more here)  Text page via Aq[employer] Paging/Directory     Clinically Significant Risk Factors          # Hypocalcemia: Lowest iCa = 1.18 mg/dL in last 2 days, will monitor and replace as appropriate  # Hypercalcemia: corrected calcium is >10.1, will monitor as appropriate    # Hypoalbuminemia: Lowest albumin = 1.8 g/dL at 12/29/2022  4:40 AM, will monitor as appropriate             # Severe Malnutrition: based on nutrition assessment      _____________________________________________________________________    Interval History   No new events overnight per RN.  Patient was transfused packed red blood cells hemoglobin now 7.8.  Had paracentesis and thoracentesis.  Overall just about the same.  No new complaints    Data reviewed today: I reviewed all medications, new labs and imaging results over the last 24 hours. I personally reviewed no images or EKG's today.    Physical Exam   Vital Signs: Temp: 97.9  F  (36.6  C) Temp src: Axillary BP: 95/63 Pulse: 94   Resp: 24 SpO2: 97 % O2 Device: Trach dome Oxygen Delivery: 20 LPM  Weight: 202 lbs 1.6 oz   General: Patient is lying in bed comfortably, he is awake alert oriented to person  HEENT: Head appears normocephalic atraumatic, eyes pupils appear equal round and reactive to light  Neck: Viable trach noted  Lungs: He has a normal respiratory effort, good air entry is noted no obvious wheezing noted  Heart: He has a good S1 and S2 no obvious murmurs, peripheral pulses are palpable bilaterally  Abdomen: Soft, distended, ascites noted, bowel sounds noted  Musculoskeletal: Good muscle tone, PICC line in right brachial artery, nails and digits appear acyanotic I did not examine his range of motion  Skin: No obvious rashes on inspection, warm to touch, please refer to nursing/wound care note for complete skin exam    Data   Recent Labs   Lab 01/13/23  0553 01/13/23  0548 01/12/23  2245 01/12/23  0625 01/12/23  0621 01/12/23  0442 01/11/23  0443 01/10/23  0608 01/09/23  1824 01/09/23  0656   WBC  --   --   --   --  4.7  --   --  4.8  --   --    HGB  --   --  7.8*  --  6.6* 6.7*   < > 7.2*   < > 7.3*   MCV  --   --   --   --  103*  --   --  102*  --   --    PLT  --   --   --   --  167  --   --  131*  --   --    NA  --   --  137  --  136 137   < > 135*   < > 134*   POTASSIUM  --   --  3.5  --  3.5 3.4*   < > 3.9   < > 3.7   CHLORIDE  --   --   --   --  99  --   --  96*  --  94*   CO2  --   --   --   --  27  --   --  28  --  27   BUN  --   --   --   --  45.6*  --   --  50.5*  --  76.0*   CR  --   --   --   --  2.08*  --   --  2.28*  --  3.14*   ANIONGAP  --   --   --   --  10  --   --  11  --  13   KELLY  --   --   --   --  8.9  --   --  8.9  --  9.2   GLC  --  112* 118 157* 152* 152*   < > 131*   < > 159*   ALBUMIN  --   --   --   --  2.1*  --   --  2.0*  --   --    PROTTOTAL 6.5  --   --   --  6.4  --    < > 6.4  --   --    BILITOTAL  --   --   --   --  0.4  --   --  0.5  --   --     ALKPHOS  --   --   --   --  368*  --   --  361*  --   --    ALT  --   --   --   --  11  --   --  11  --   --    AST  --   --   --   --  19  --   --  24  --   --     < > = values in this interval not displayed.     Medications     dextrose       - MEDICATION INSTRUCTIONS -         sodium chloride 0.9%  300 mL Hemodialysis Machine During Dialysis/CRRT (from stock)     acetaminophen  975 mg Per Feeding Tube Q8H BIJU    Or     acetaminophen  650 mg Rectal Q8H BIJU     acetylcysteine  2 mL Nebulization 4x daily     albuterol  2.5 mg Nebulization 4x daily     [Held by provider] apixaban ANTICOAGULANT  5 mg Oral or Feeding Tube BID     carboxymethylcellulose PF  1 drop Both Eyes TID     chlorhexidine  15 mL Mouth/Throat BID     epoetin mirta-epbx  40,000 Units Subcutaneous Weekly     fluconazole  200 mg Per Feeding Tube At Bedtime     folic acid  1 mg Oral or Feeding Tube Daily     heparin Lock (1000 units/mL High concentration)  1,900 Units Intracatheter During Dialysis/CRRT (from stock)     heparin Lock (1000 units/mL High concentration)  1,900 Units Intracatheter During Dialysis/CRRT (from stock)     insulin aspart  1-6 Units Subcutaneous Daily     insulin glargine  20 Units Subcutaneous Daily     lacosamide  100 mg Oral or Feeding Tube BID     lactulose  20 g Oral or Feeding Tube BID     levETIRAcetam  1,000 mg Oral or Feeding Tube Q24H     Melatonin  6 mg Oral or NG Tube At Bedtime     midodrine  10 mg Oral During Dialysis/CRRT (from stock)     midodrine  5 mg Oral or Feeding Tube TID w/meals     mineral oil-hydrophilic petrolatum   Topical Daily     multivitamin RENAL  1 tablet Per Feeding Tube Daily     nystatin  500,000 Units Swish & Swallow 4x Daily     pantoprazole  40 mg Per Feeding Tube BID     QUEtiapine  75 mg Oral or Feeding Tube At Bedtime     rifaximin  550 mg Per Feeding Tube BID     sodium chloride (PF)  10-30 mL Intracatheter Q8H     tacrolimus  1 mg Oral or Feeding Tube BID   In speaking valve

## 2023-01-13 NOTE — PROGRESS NOTES
Renal progress note  CC: LORETTA likely progressed to ESRD  Assessment and Plan:  57 YOM EtOH cirrhosis , SP liver tx 2016, on IHD for LORETTA likely progressed to ESRD since 11/29/2022, recent prolonged hosp for PEA arrest 11/15/2022 to 12/15/2022 , cb influenza and bact PNA, sp Trach and PEG and had a second PEA arrest 12/20 with seizures , cb pleural effusions (fungal infection), and recurrent ascites , CSF + for HHV 6, now at [facility] , nephrology following for ESRD management.    Anuric LORETTA likely ESRD now  requiring dialysis: After PEA arrest, was on CRRT which was discontinued 11/26/2022 and now started on intermittent hemodialysis ongoing since 11/29/2022.  He is maintained on a MWF schedule.  No signs of renal recovery yet.    -Attempted diuretic challenge and bladder scans->remains anuric and torsemide was discontinued 1/2  -Aim to keep net negative.  Target UF 2-3kg as BP allows  -Midodrine to support blood pressure and UF.   -MWF HD at [facility]     Anemia of chronic renal failure:   Hematochezia   Left hemothorax.    Erythropoietin -> Dose escalated to 40K weekly   Previously low iron, holding off replacement given infectious concerns as below   Transfusing per hospitalist service      Chronic hypotension: Likely due to chronic liver disease, improving.  on midodrine to 5mg TID with parameters to hold for SBP>110.  may need to increase if hemodynamics do not support UF  Will hold off albumin for now     Hypokalemia: Needing intermittent replacement.    He is higher K bath with HD.     Acute on chronic hypoxic respiratory failure: S/p tracheostomy 11/29/2022.  Complicated by left hemopneumothorax s/p left chest tube.  History of aspergillus PNA and multiple bacterial PNA. On micafungin.  Mgmt per pulm      CARRILLO cirrhosis s/p liver transplant: Immunosuppression per GI.  Tacrolimus and prednisone.  Paracentesis as indicated per GI, planned for 1/13/2023. On Abx for lactobacillus peritonitis.      HHV-6 meningeal  encephalitis:  Seizures:  LP showing HHV6 12/21/22.  Initial seizure after PEA arrest, then another seizure again 12/21/2022 during dialysis session.  Initially on acyclovir and then switched over to ganciclovir, since been discontinued.  On Keppra and Vimpat.  Management per neurology and ID    Thank you for the consultation we will follow  Jyotsna Gamble PA-C   [facility]  [phone]      Subjective  Seen at bedside, noted plan to transfer to [facility] for paracentesis and thoracentesis today. Confirmed with HD RN he will dialyze once transferred back. Had swallow study this am, Blanco thinks he might be permitted to eat.     Objective    Vital signs in last 24 hours  Temp:  [97.5  F (36.4  C)-99.3  F (37.4  C)] 97.7  F (36.5  C)  Pulse:  [] 98  Resp:  [22-36] 26  BP: ()/(56-81) 124/72  FiO2 (%):  [25 %-30 %] 25 %  SpO2:  [94 %-100 %] 100 %  Weight:   [unfilled]    Intake/Output last 3 shifts  I/O last 3 completed shifts:  In: 949 [I.V.:30; NG/GT:625]  Out: 25 [Stool:25]  Intake/Output this shift:  I/O this shift:  In: 1194 [NG/GT:1194]  Out: 100 [Stool:100]    Physical Exam  Awake, NAD, fatigued appearing   CV: RRR without murmur or rub  Lung: clear and equal; no extra sounds, + trach   Ab: + distended, minimal tension   Ext: no edema and well perfused  Skin; no rash    Pertinent Labs   Lab Results   Component Value Date    WBC 4.7 01/12/2023    HGB 7.8 (L) 01/12/2023    HCT 22.3 (L) 01/12/2023     (H) 01/12/2023     01/12/2023     Lab Results   Component Value Date    BUN 45.6 (H) 01/12/2023     01/12/2023    CO2 27 01/12/2023       Lab Results   Component Value Date    ALBUMIN 2.1 (L) 01/12/2023     Lab Results   Component Value Date    PHOS 2.8 01/13/2023     I reviewed all lab results  Jyotsna Gamble PA-C

## 2023-01-13 NOTE — PLAN OF CARE
7 PM to 7 AM RT PROGRESS NOTE     DATA:     CURRENT SETTINGS:             TRACH TYPE / SIZE:  7 Bivona placed 1/9/23             MODE:  30% @ 20 LPM HFTM with cuff up.     ACTION:             THERAPIES:   QID Albuterol and Mucomyst neb given             SUCTION:                           FREQUENCY:  X 4                        AMOUNT: large x 1, small x 2, mod x 1                          CONSISTENCY: thick                        COLOR:  pale yellow              SPONTANEOUS COUGH EFFORT/STRENGTH OF EFFORT (not elicited by suctioning): Good                              WEANING PHASE:  1-10-23: TM/PMV day.  TM/cuff up night                        WEAN MODE:    PMV.                        WEAN TIME:   0957- 2035, for 10 hours and 32 minutes.                        END WEAN REASON:   End of designated wean time.     RESPONSE:             BS:  Clear and dim             VITAL SIGNS:   SATs 97-99%, -107, RR 24-28 and a little shallow at times.              RISK FOR SELF DECANNULATION:  Yes   REASON: Impulsive   INTERVENTION: Bilateral soft limb restraints     NOTE / PLAN:   Anticipate pt going down for VFSS at 0950 and later, to [facility] for a right thoracentesis and paracentesis at 1100. Continue with same otherwise.    Problem: Mechanical Ventilation Invasive  Goal: Optimal Device Function  Outcome: Progressing  Intervention: Optimize Device Care and Function  Recent Flowsheet Documentation  Taken 1/10/2023 0103 by Ivon Clayton, RT  Airway Safety Measures:    all equipment/monitors on and audible    manual resuscitator/mask/valve in room  Taken 1/9/2023 2228 by Ivon Clayton, RT  Airway Safety Measures:    all equipment/monitors on and audible    manual resuscitator/mask/valve in room  Taken 1/9/2023 1948 by Ivon Clayton, RT  Airway Safety Measures:    all equipment/monitors on and audible    manual resuscitator/mask/valve in room  Taken 1/9/2023 1930 by Ivon Clayton, RT  Airway Safety  Measures:    all equipment/monitors on and audible    manual resuscitator/mask/valve in room  Goal: Mechanical Ventilation Liberation  Outcome: Progressing  Goal: Optimal Nutrition Delivery  Outcome: Progressing  Goal: Absence of Device-Related Skin and Tissue Injury  Outcome: Progressing  Goal: Absence of Ventilator-Induced Lung Injury  Outcome: Progressing

## 2023-01-13 NOTE — PLAN OF CARE
Problem: Artificial Airway  Goal: Effective Communication  Outcome: Progressing  Goal: Optimal Device Function  Outcome: Progressing  Intervention: Optimize Device Care and Function  Recent Flowsheet Documentation  Taken 1/13/2023 1317 by Rocio Giang RT  Airway Safety Measures: all equipment/monitors on and audible  Taken 1/13/2023 1035 by Rocio Giang RT  Airway Safety Measures: all equipment/monitors on and audible  Taken 1/13/2023 0708 by Rocio Giang RT  Airway Safety Measures: all equipment/monitors on and audible  Taken 1/13/2023 0700 by Rocio Giang RT  Airway Safety Measures: all equipment/monitors on and audible  Goal: Absence of Device-Related Skin or Tissue Injury  Outcome: Progressing     Problem: Mechanical Ventilation Invasive  Goal: Optimal Device Function  Outcome: Met  Intervention: Optimize Device Care and Function  Recent Flowsheet Documentation  Taken 1/13/2023 1317 by Rocio Giang RT  Airway Safety Measures: all equipment/monitors on and audible  Taken 1/13/2023 1035 by Rocio Giang RT  Airway Safety Measures: all equipment/monitors on and audible  Taken 1/13/2023 0708 by Rocio Giang RT  Airway Safety Measures: all equipment/monitors on and audible  Taken 1/13/2023 0700 by Rocio Giang RT  Airway Safety Measures: all equipment/monitors on and audible  Goal: Mechanical Ventilation Liberation  Outcome: Met  Goal: Optimal Nutrition Delivery  Outcome: Met  Goal: Absence of Device-Related Skin and Tissue Injury  Outcome: Met  Goal: Absence of Ventilator-Induced Lung Injury  Outcome: Met     Problem: Mechanical Ventilation Invasive  Goal: Optimal Device Function  Intervention: Optimize Device Care and Function  Recent Flowsheet Documentation  Taken 1/13/2023 1317 by Rocio Giang RT  Airway Safety Measures: all equipment/monitors on and audible  Taken 1/13/2023 1035 by Rocio Giang RT  Airway Safety Measures: all equipment/monitors on and audible  Taken 1/13/2023 0708  by Rocio Giang, RT  Airway Safety Measures: all equipment/monitors on and audible  Taken 1/13/2023 0700 by Rocio Giang, RT  Airway Safety Measures: all equipment/monitors on and audible   Goal Outcome Evaluation:

## 2023-01-13 NOTE — PROGRESS NOTES
"SPEECH PATHOLOGY: VIDEO SWALLOW STUDY     01/13/23 1015   Appointment Info   Signing Clinician's Name / Credentials (SLP) Matti Santos MA, CCC-SLP   General Information   Onset of Illness/Injury or Date of Surgery 12/16/22   Referring Physician Narinder   Patient/Family Therapy Goal Statement (SLP) To eat and drink   Pertinent History of Current Problem Per pulmonary note, dated 12/29/22: \"57yoM with liver transplant(2016), recent prolonged hospitalization 11/12-12/15 for PEA cardiac arrest, Strep/Parainfluenza pneumonia with ARDS, MODS, right PTX requiring chest tube since removed.  Now on HD, s/p trach/PEG.  Discharged to LTAC and sent out the next day with new left PTX & GIB.  Recent hospitalization c/b GIB, PEA arrest and seizure on 12/20.  Left CT removed on 12/16.  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  Transferred back to LTAC 12/29.\"   General Observations Speaking valve in place with trach mask and 6 L O2.  Alert and cooperative.   Type of Evaluation   Type of Evaluation Swallow Evaluation   Respiratory Status (Speaking Valve)   Oxygen Supply trach mask   Oral Motor   Oral Musculature generally intact   Structural Abnormalities none present   Mucosal Quality adequate   Dentition (Oral Motor)   Dentition (Oral Motor) natural dentition   Facial Symmetry (Oral Motor)   Facial Symmetry (Oral Motor) WNL   Lip Function (Oral Motor)   Lip Range of Motion (Oral Motor) WNL   Tongue Function (Oral Motor)   Tongue ROM (Oral Motor) WNL   Tongue Strength (Oral Motor) WNL   Tongue Coordination/Speed (Oral Motor) WNL   Vocal Quality/Secretion Management (Oral Motor)   Vocal Quality (Oral Motor) breathy;hoarse   General Swallowing Observations   Current Diet/Method of Nutritional Intake (General Swallowing Observations, NIS) NPO;gastrostomy tube (PEG)   Respiratory Support (General " Swallowing Observations) trach collar   Past History of Dysphagia Blue dye test completed on 12/30/2022 and 1/10/2023 with no immediate aspiration of secretions but positive delayed aspiration of secretions noted.  Inconsistent signs and symptoms noted on clinical swallow evaluation on 1/12/2023   Swallowing Evaluation Videofluoroscopic swallow study (VFSS)   Clinical Swallow Evaluation   Feeding Assistance frequent cues/help required   VFSS Evaluation   Views Taken left lateral   VFSS Textures Trialed thin liquids;mildly thick liquids;moderately thick liquids/liquidized;pureed;solid foods   VFSS Eval: Thin Liquid Texture Trial   Mode of Presentation, Thin Liquid spoon;cup;straw   Preparatory Phase WFL   Oral Phase, Thin Liquid premature pharyngeal entry   Bolus Location When Swallow Triggered posterior laryngeal surface of epiglottis   Pharyngeal Phase, Thin Liquid pharyngeal wall coating;residue in vallecula;residue in pyriform sinus;impaired pharyngoesophageal segment opening   Rosenbek's Penetration Aspiration Scale: Thin Liquid Trial Results 7 - contrast passes glottis, visible subglottic residue remains despite patient's response (aspiration)   Response to Aspiration unproductive reflexive cough   Strategies and Compensations chin tuck;reduce bolus size   Diagnostic Statement Mildly delayed swallow reflex leads to entrance into airway. Patient presented with aspiration via cup with delayed and unproductive cough reflex. With use of strategies of small sips, via spoon, patient presented with deep laryngeal penetration. Mild diffuse pharyngeal residue. Mild retrograde flow UES.   VFSS Eval: Mildly Thick Liquids   Mode of Presentation spoon;cup   Preparatory Phase WFL   Oral Phase premature pharyngeal entry   Bolus Location When Swallow Triggered valleculae   Pharyngeal Phase impaired tongue base retraction;pharyngeal wall coating;residue in vallecula;residue in pyriform sinus;impaired pharyngoesophageal segment  opening  (mild)   Rosenbek's Penetration Aspiration Scale 8 - contrast passes glottis, visible subglottic residue remains, absent patient response (aspiration)   Response to Aspiration absent response   Strategies and Compensations reduce bolus size;chin tuck   Diagnostic Statement Mildly delayed swallow reflex leads to entrance into airway with aspiration via cup with no protective cough response (silent aspiration). With use of strategies, no aspiration was observed but continued to present with shallow to deep laryngeal penetration that did not clear spontaneously or with cuing. Mild diffuse pharyngeal residue. Mild retrograde flow UES.   VFSS Eval: Moderately Thick Liquids   Mode of Presentation spoon;cup   Preparatory Phase WFL   Oral Phase premature pharyngeal entry   Bolus Location When Swallow Triggered valleculae   Pharyngeal Phase impaired tongue base retraction;pharyngeal wall coating;residue in vallecula;residue in pyriform sinus;impaired pharyngoesophageal segment opening   Rosenbek's Penetration Aspiration Scale 1 - no aspiration, contrast does not enter airway   Diagnostic Statement Mildly delayed swallow reflex but adequate airway protection. Notable for mild diffuse pharyngeal residue. Mild retrograde flow UES.   VFSS Evaluation: Puree Solid Texture Trial   Mode of Presentation, Puree spoon   Preparatory Phase WFL   Oral Phase, Puree premature pharyngeal entry   Bolus Location When Swallow Triggered valleculae   Pharyngeal Phase, Puree impaired tongue base retraction;pharyngeal wall coating;residue in vallecula;residue in pyriform sinus;impaired pharyngoesophageal segment opening  (mild)   Rosenbek's Penetration Aspiration Scale: Puree Food Trial Results 1 - no aspiration, contrast does not enter airway   Diagnostic Statement Mildly delayed swallow reflex but adequate airway protection. Notable for mild diffuse pharyngeal residue. Mild retrograde flow UES.   VFSS Evaluation: Solid Food Texture Trial    Mode of Presentation, Solid self-fed   Preparatory Phase prolonged bolus preparation;insufficient mastication  (mild)   Oral Phase, Solid impaired AP movement;premature pharyngeal entry   Bolus Location When Swallow Triggered valleculae   Pharyngeal Phase, Solid impaired tongue base retraction;pharyngeal wall coating;residue in vallecula;residue in pyriform sinus;impaired pharyngoesophageal segment opening  (mild)   Rosenbek's Penetration Aspiration Scale: Solid Food Trial Results 1 - no aspiration, contrast does not enter airway   Diagnostic Statement Mildly decreased and piecemeal deglutition with mildly delayed swallow reflex and decreased pharyngeal constriction. Notable for mild diffuse pharyngeal residue. Mild retrograde flow UES.   Esophageal Phase of Swallow   Patient reports or presents with symptoms of esophageal dysphagia No   Esophageal sweep performed during today s vidofluoroscopic exam  Yes;Please refer to radiologist's report for details   Swallowing Recommendations   Diet Consistency Recommendations moderately thick liquids/liquidized (level 3);soft & bite-sized (level 6)   Supervision Level for Intake close supervision needed   Mode of Delivery Recommendations bolus size, small;slow rate of intake   Monitoring/Assistance Required (Eating/Swallowing) stop eating activities when fatigue is present;monitor for cough or change in vocal quality with intake   Recommended Feeding/Eating Techniques (Swallow Eval) maintain upright sitting position for eating;maintain upright posture during/after eating for 30 minutes   Medication Administration Recommendations, Swallowing (SLP) via [facility]   General Therapy Interventions   Planned Therapy Interventions Dysphagia Treatment   Dysphagia treatment Oropharyngeal exercise training;Modified diet education;Instruction of safe swallow strategies;Compensatory strategies for swallowing   Clinical Impression   Criteria for Skilled Therapeutic Interventions Met (SLP  Eval) Yes, treatment indicated   SLP Diagnosis Dysphagia   Risks & Benefits of therapy have been explained evaluation/treatment results reviewed;care plan/treatment goals reviewed;risks/benefits reviewed;participants voiced agreement with care plan;participants included;patient   Clinical Impression Comments Patient presents with moderate oropharyngeal dysphagia characterized by mildly decreased bolus prep and AP bolus transit, delayed swallow reflex and decreased pharyngeal constriction.  As result, demonstrated aspiration and penetration inconsistently with mildly thick and thin liquids.  Inconsistent cough reflex in response to aspiration.  Appeared to be dependent on volume of bolus aspirated.   SLP Total Evaluation Time   Evaluation, videofluoroscopic eval of swallow function Minutes (95750) 15   Swallowing Dysfunction &/or Oral Function for Feeding   Treatment of Swallowing Dysfunction &/or Oral Function for Feeding Minutes (74328) 8   Treatment Detail/Skilled Intervention Under fluoroscopy, patient was instructed in use of strategy: small sip strategy and chin tuck. Patient did demonstrate understanding of instruction for chin tuck as evidenced by appropriate execution of strategy. Did not demonstrate understanding of instruction for small sip strategy, as reduction of bolus size was not elicited despite cues. Result: Strategy of each were not effective in sufficiently reducing risk of entrance into the airway.   SLP Discharge Planning   SLP Plan f/u re: diet tolerance; pharyngeal exercises including effortful, Shaker   SLP Discharge Recommendation Transitional Care Facility   SLP Rationale for DC Rec Communication, cognition, and swallow function are below baseline   SLP Brief overview of current status  Start diet of soft and bite sized and moderately thick liquids. Continues to be at risk for aspiration warranting ongoing analysis and intervention   Total Session Time   Total Session Time (sum of timed and  untimed services) 23

## 2023-01-13 NOTE — PROGRESS NOTES
Nutrition Therapy:    S/B: per SLP ok for soft and bite sized level 6 diet with moderately thickened liquids  A/R: recommend provider add dialysis diet to  Oral diet when ordered.   modified enteral schedule to back up bolus feedings  Via GT TID after meals IF INTAKE , 50% . One back up bolus enteral feeding of Novasource renal 250 ml ( 125 ml over 2 h) w/ water flush 100 ml b/4 and after feeding when intake < 50 % of meal will provide: 250 ml formula, 500 kcal, 23 g protein, 46 g carbohydrate, 0 g fiber, 179 ml free fluid from formula + 200ml  flush =379 ml

## 2023-01-13 NOTE — PLAN OF CARE
Problem: Pain Acute  Goal: Optimal Pain Control and Function  Outcome: Progressing  Intervention: Prevent or Manage Pain  Recent Flowsheet Documentation  Taken 1/13/2023 0300 by Radha Bill RN  Bowel Elimination Promotion: (rectl tube) --   Goal Outcome Evaluation:  Received patient alert but confused. Noted to be restless and agitated. Requested to be repositioned q hourly. Uncomfortable with the present position. Complained of abdominal pain. Tylenol given as standing order medication. Abdominal distention noted. All due medication given. Prn zolpidem and seroquel given but patient attempted to removed leads from the tele and trach. Received an order to put the patient is soft restraints. Patient did not sleep all night. Intermittent checked done. Slept at around 0530. Rectal tube output 200 ml. All needs attended.

## 2023-01-13 NOTE — PROGRESS NOTES
Social Work Note:  Patient chart reviewed.  Patient discussed in morning rounds.  Barriers to discharge:   * Trach.  Phase  2 30%/30L TM.  Phase 2 weans: TM/PMV day.  TM/cuff up at night  * Rectal Tube  * tele monitor  * WOC-bilat feet.  * right thoracentesis and paracentesis took place 01/13/23     Patient here with Trach, Nebs:Duo/Muco QID, rectal tube, tele monitor, WOC, and feeding tube.  Patient's exact discharge date, time, disposition TBD  SW will continue to follow for psychosocial and emotional support of patient and family. SW to facilitate discharge to TCU when pt no longer requires LTACH level of care           Kb Liu, [facility]  [phone]

## 2023-01-13 NOTE — PROGRESS NOTES
RT PROGRESS NOTE     DATA:     CURRENT SETTINGS:             TRACH TYPE / SIZE:  #7 [place] (placed 1/9)             MODE:   TM/PMV             FIO2:   30%/20L     ACTION:             THERAPIES:   Albuterol/Mucomyst QID             SUCTION:                           FREQUENCY:   x3                        AMOUNT:   Scant to Moderate                        CONSISTENCY:   Thick                        COLOR:   Pale Yellow             SPONTANEOUS COUGH EFFORT/STRENGTH OF EFFORT (not elicited by suctioning): Weak Spontaneous Cough                           WEANING PHASE:   Phase 2                        WEAN MODE:    30%/20L TM/PMV                        WEAN TIME:   PMV at 7:08am                        END WEAN REASON:   Still Weaning     RESPONSE:             BS:   Coarse/Diminished             VITAL SIGNS:   Sating %, HR , RR 24-26             EMOTIONAL NEEDS / CONCERNS:  NA                RISK FOR SELF DECANNULATION:  Yes             RISK DUE TO:  Impulsive             INTERVENTION/S IN PLACE IS/ARE:  Restrained       NOTE / PLAN:   TM/PMV day and TM/cuff up at night, slowly increase PMV into the night.  RT will continue to monitor.

## 2023-01-13 NOTE — PLAN OF CARE
Problem: Plan of Care - These are the overarching goals to be used throughout the patient stay.    Goal: Plan of Care Review  Description: The Plan of Care Review/Shift note should be completed every shift.  The Outcome Evaluation is a brief statement about your assessment that the patient is improving, declining, or no change.  This information will be displayed automatically on your shift note.  Outcome: Progressing     Problem: Pain Acute  Goal: Optimal Pain Control and Function  Outcome: Progressing     Problem: Enteral Nutrition  Goal: Absence of Aspiration Signs and Symptoms  Outcome: Progressing     Problem: Artificial Airway  Goal: Effective Communication  Outcome: Progressing     Problem: Anemia  Goal: Anemia Symptom Improvement  Outcome: Progressing  Intervention: Monitor and Manage Anemia  Recent Flowsheet Documentation  Taken 1/13/2023 0954 by Inna Guzman, RN  Safety Promotion/Fall Prevention:    activity supervised    fall prevention program maintained    clutter free environment maintained  Taken 1/13/2023 0859 by Inna Guzman, RN  Safety Promotion/Fall Prevention:    activity supervised    bed alarm on    fall prevention program maintained     Problem: Restraint, Nonviolent  Goal: Absence of Harm or Injury  Outcome: Progressing   Goal Outcome Evaluation:      Patient denied pain at the start of shift, was anxious and confused at the beginning of the shift, calmer later in the shift, still confused though. Patient tolerated tube feeding okay (turned off at 1115 for transport). Patient had a video swallow study done, midodrine was held this morning for a blood pressure of 124/72 at 0833 (blood pressure was 120/74 at 0747), respiration 26, still in restraint for pulling lines, phos 2.8 today, will have dialysis today (3rd run). Patient was picked up at 1118 for procedures at [facility] (paracentesis and thoracentesis), report was given to Jihan. Patient's tele was taken off for transport (rhtym this  morning was normal sinus-usually sinus tachy)

## 2023-01-13 NOTE — PROGRESS NOTES
Patient returned back to the unit from [facility] after procedures at 1315 in a stable condition, had 7500 ml of fluids removed from abdomen and less than 50 from chest per report (fluid was sent for ordered labs per documentation). Patient's restraint was removed at 1335 (provider was informed), midodrine was given late (blood pressure was 98/67 at 1417 prior to given this. Sepsis BPA MD nathen ([facility]) was informed-holding off on this for now as directed by the provider, patient to be monitored

## 2023-01-13 NOTE — PROGRESS NOTES
Pulmonary Progress Note    Admit Date: 12/29/2022  CODE: Full Code    Assessment/Plan:   57yoM with liver transplant(2016), recent prolonged hospitalization 11/12-12/15 for PEA cardiac arrest, Strep/Parainfluenza pneumonia with ARDS, MODS, ?aspergillus pna s/p 1 week of voriconazole, right PTX requiring chest tube since removed.  Now HD dependent, s/p trach/PEG.  Course further c/b new left PTX requiring chest tube(since removed), b/l pleural effusions, ascites, mucus plugging, PEA arrest and seizure on 12/20, pleural fluid cxs growing candida parapsilosis & ascites cx growing lactobacillus on antimicrobials, and CSF panel positive for HHV6 ultimately decided to not treat.  Admitted to [facility] 12/29.     Discussion of pertinent problems:  Acute hypoxic/hypercapnic respiratory failure s/p trach: Liberated from the vent on 1/9 with acceptable VBG on trach dome.  Tolerating prolonged speaking valve trials.      Hx bilateral pleural effusions s/p multiple thoracentesis: moderate right effusion on recent imaging     Tracheostomy in place: 8 Shiley placed 11/29.  Changed to 6 Shiley prox xlt 12/8, unclear why.  Downsized to 7 Bivona 1/9 without issue.      Dysphagia s/p PEG tube.  BDT 12/30 with delayed aspiration.  Tolerating PMV trials well after trach downsize.  Repeat BDT 1/10 with no immediate and faint delayed aspiration.  Bedside swallow eval today shows s/s aspiration.       Candida in left pleural fluid cultures: started on Micafungin switched to fluconazole 1/3 based on cx sensitivities.    - Cont fluconazole for ~4 week total course based on imaging; tentative end date ~1/23    [facility] on MWF HD: followed by Nephrology     S/p Liver transplant with post transplant cirrhosis & ascites s/p paracentesis 12/22: on [place] and prednisone    CHRISTIAN on home BiPAP.  Managed currently with trach and vent     lactobacillus peritonitis s/p course Unasyn transitioned to PO Augmentin; ended 1/11    Seizure after hypoxic event on 12/20  with subsequent seizure 12/21 during dialysis:   - Keppra and vimpat indefinitely     Multifocal acute ischemic strokes on MRI(12/20).    - Eliquis now on hold d/t acute anemia     Hx b/l pneumothoraces: right CT first placed 11/16, removed, then replaced by IR on 12/12, removed again 12/19.  Left chest tube placed 12/16, ?hemithorax, CT removed on 12/19.  Stable on recent imaging     Hx mucus plugging of bronchi.  Bronch on 12/20 with minimal secretions.  Mucus plug suctioned 1/4 and restarted on metanebs, stopped again d/t bloody secretions     Acute on chronic Anemia: s/p 1 unit PRBCs 1/4 for Hgb 6.3.  Intermittent bloody tracheal secretions, Apixaban held 1/4.  Hgb 6.8 on 1/6 s/p 1 unit PRBCs, and had large amount of bloody secretions; CTA neck & C/A/P unremarkable for acute bleed.  Bloody secretions now resolved.  Hgb 6.6 on 1/12 s/p 1 unit PRBCs     Plan:   - Phase 2 weans: TM/PMV day.  TM/cuff up at night.  Slowly increase speaking valve into the night   - Right thora and paracentesis today - labs ordered.  Ok to send with PMV on but would send syringe in case staff would need to remove PMV and inflate cuff  - Bronchial hygiene with Albuterol + mucomyst nebs QID  - VFSS today - moderate dysphagia, starting bit sized and moderately thick liquids   - Repeat chest imaging ~1/23 before consider stopping Fluconazole      Henry Fenton CNP  Pulmonary Medicine  [facility]  Pager [phone]    Subjective/Interim Events:   - denies sob, n/v, fever, chills.  Tired after VFSS  - soft wrist restraints on d/t pulling at tubes     Tracheal secretions:  Overnight - x4, sm/lg, thick  Yesterday - x3, sm/mod, thick    Cough strength: weak/moderate    Current phase of ventilator weaning pathway:  Phase 2    Ventilator weaning results: continuous TM since 1/10    Clinical status discussed today with respiratory therapist, patient, pt's brother    Medications:       dextrose       - MEDICATION INSTRUCTIONS -         sodium  chloride 0.9%  300 mL Hemodialysis Machine During Dialysis/CRRT (from stock)     acetaminophen  975 mg Per Feeding Tube Q8H BIJU    Or     acetaminophen  650 mg Rectal Q8H BIJU     acetylcysteine  2 mL Nebulization 4x daily     albuterol  2.5 mg Nebulization 4x daily     [Held by provider] apixaban ANTICOAGULANT  5 mg Oral or Feeding Tube BID     carboxymethylcellulose PF  1 drop Both Eyes TID     chlorhexidine  15 mL Mouth/Throat BID     epoetin mirta-epbx  40,000 Units Subcutaneous Weekly     fluconazole  200 mg Per Feeding Tube At Bedtime     folic acid  1 mg Oral or Feeding Tube Daily     heparin Lock (1000 units/mL High concentration)  1,900 Units Intracatheter During Dialysis/CRRT (from stock)     heparin Lock (1000 units/mL High concentration)  1,900 Units Intracatheter During Dialysis/CRRT (from stock)     insulin aspart  1-6 Units Subcutaneous Daily     insulin glargine  20 Units Subcutaneous Daily     lacosamide  100 mg Oral or Feeding Tube BID     lactulose  20 g Oral or Feeding Tube BID     levETIRAcetam  1,000 mg Oral or Feeding Tube Q24H     Melatonin  6 mg Oral or NG Tube At Bedtime     midodrine  10 mg Oral During Dialysis/CRRT (from stock)     midodrine  5 mg Oral or Feeding Tube TID w/meals     mineral oil-hydrophilic petrolatum   Topical Daily     multivitamin RENAL  1 tablet Per Feeding Tube Daily     nystatin  500,000 Units Swish & Swallow 4x Daily     pantoprazole  40 mg Per Feeding Tube BID     QUEtiapine  75 mg Oral or Feeding Tube At Bedtime     rifaximin  550 mg Per Feeding Tube BID     sodium chloride (PF)  10-30 mL Intracatheter Q8H     tacrolimus  1 mg Oral or Feeding Tube BID         Exam/Data:   Vitals  /72 (BP Location: Left arm, Patient Position: Semi-Salgado's)   Pulse 98   Temp 97.7  F (36.5  C) (Oral)   Resp 26   Wt 91.7 kg (202 lb 1.6 oz)   SpO2 100%   BMI 27.41 kg/m       I/O last 3 completed shifts:  In: 949 [I.V.:30; NG/GT:625]  Out: 25 [Stool:25]  Weight change: 2.359  kg (5 lb 3.2 oz)    FiO2 (%): 25 %  Resp: 26      EXAM:  Gen: no distress lying in bed, b/l soft wrist restraints on   HEENT: NT, trach midline/intact, PMV on, no stridor  CV: RRR  Resp: CTAB after suctioning; non-labored   Abd: large, distended, soft, mildly tender, BS hypoactive  Skin: no visible rashes or lesions, scattered ecchymosis, fragile   Ext: mild pedal edema, weak  Neuro: alert, follows commands, mildly confused     ROS:  A 10-system review was obtained and is negative with the exception of the symptoms noted above.    Labs:  Basic Metabolic Panel  Recent Labs   Lab 01/13/23  0548 01/12/23  2245 01/12/23  0625 01/12/23  0621 01/12/23  0442 01/12/23  0003 01/11/23  0443 01/10/23  0608 01/09/23  1824 01/09/23  0656   NA  --  137  --  136 137 135*   < > 135*   < > 134*   POTASSIUM  --  3.5  --  3.5 3.4* 3.6   < > 3.9   < > 3.7   CHLORIDE  --   --   --  99  --   --   --  96*  --  94*   CO2  --   --   --  27  --   --   --  28  --  27   BUN  --   --   --  45.6*  --   --   --  50.5*  --  76.0*   CR  --   --   --  2.08*  --   --   --  2.28*  --  3.14*   * 118 157* 152* 152* 140*   < > 131*   < > 159*    < > = values in this interval not displayed.     CBC RESULTS: Recent Labs   Lab Test 01/09/23  0656 01/05/23  1442 01/04/23  0623 12/27/22  0420   WBC  --   --   --  7.5   RBC  --   --   --  2.76*   HGB 7.3*   < > 6.3* 8.8*   HCT  --   --   --  29.4*   MCV  --   --   --  107*   MCH  --   --   --  31.9   MCHC  --   --   --  29.9*   RDW  --   --   --  18.7*   PLT  --   --  58* 63*    < > = values in this interval not displayed.      Venous Blood Gas  Recent Labs   Lab 01/12/23  2245 01/12/23  0442 01/12/23  0003 01/11/23  0635   PHV 7.41 7.40 7.45 7.41   PCO2V 47 49 44 50   PO2V 33 34 29 34   HCO3V 28 29 29 30   MITA 5.5* 5.5* 6.7* 6.9*        RADIOLOGY: Personally reviewed; radiology read below   CTA CHEST ABDOMEN PELVIS W CONTRAST 1/6/2023                                                                    IMPRESSION:  1. No obvious active bleeding identified.  2. Large amount of abdominal/pelvic ascites, splenomegaly, and scattered varicosities. The above findings would be most typical for sequelae of portal venous hypertension.  3. Significant stranding of the omental fat and intraperitoneal fat, this is nonspecific, but may be reactive or neoplastic in nature.  4. Percutaneous gastrostomy tube, tracheostomy, and the rectal tube appear to be in good position.  5. Central catheter with tip in the inferior right atrium.  6. Mild to moderate right pleural effusion and mild left pleural effusion, both of these appear to be loculated given the peripheral enhancement. Presumed sequelae of prior hematoma seen in the right lung.  7. Minute amount of gas seen associated with the left pleural effusion and this is presumed to be iatrogenic in nature.  8. Slight filling defect within the lower trachea most typical for slight mucous.  9. Enlargement of the main pulmonary artery presumed to represent sequelae of pulmonary arterial hypertension.   ----------------  CTA NECK WITH CONTRAST 1/6/2023     INDICATION: Tracheostomy in place, worsening bloody tracheal secretions, rule out tracheoinnominate fistula.                                                                   IMPRESSION:   1.  No significant stenosis or dissection.  2.  No definite findings to suggest a tracheoinnominate artery fistula.  ----------------------  XR CHEST  1/4/2023                                                                   IMPRESSION:      Right PICC terminates in the middle third of the SVC. Tunneled right jugular approach dialysis catheter terminates in the right atrium. Tracheostomy resides within the tracheal air column.     Persistent shallow lung inflation. Territories of bibasilar atelectasis are similar. Small right pleural effusion visible in the right lateral sulcus and layering into the interlobar fissures is similar. No enlarging  pleural effusion. No pneumothorax.     Unchanged heart and mediastinal contours. Unchanged oblique interface just lateral to the dialysis catheter/SVC and corresponds to rounded atelectasis on prior CT.

## 2023-01-14 ENCOUNTER — APPOINTMENT (OUTPATIENT)
Dept: OCCUPATIONAL THERAPY | Facility: CLINIC | Age: 58
DRG: 207 | End: 2023-01-14
Attending: HOSPITALIST
Payer: COMMERCIAL

## 2023-01-14 LAB
ALBUMIN SERPL BCG-MCNC: 1.9 G/DL (ref 3.5–5.2)
ALP SERPL-CCNC: 250 U/L (ref 40–129)
ALT SERPL W P-5'-P-CCNC: 11 U/L (ref 10–50)
ANION GAP SERPL CALCULATED.3IONS-SCNC: 9 MMOL/L (ref 7–15)
AST SERPL W P-5'-P-CCNC: 23 U/L (ref 10–50)
BASOPHILS # BLD AUTO: 0.1 10E3/UL (ref 0–0.2)
BASOPHILS # BLD AUTO: 0.1 10E3/UL (ref 0–0.2)
BASOPHILS NFR BLD AUTO: 1 %
BASOPHILS NFR BLD AUTO: 1 %
BILIRUB SERPL-MCNC: 0.7 MG/DL
BUN SERPL-MCNC: 34.1 MG/DL (ref 6–20)
CALCIUM SERPL-MCNC: 8.9 MG/DL (ref 8.6–10)
CHLORIDE SERPL-SCNC: 97 MMOL/L (ref 98–107)
CREAT SERPL-MCNC: 1.94 MG/DL (ref 0.67–1.17)
DEPRECATED HCO3 PLAS-SCNC: 28 MMOL/L (ref 22–29)
EOSINOPHIL # BLD AUTO: 0.1 10E3/UL (ref 0–0.7)
EOSINOPHIL # BLD AUTO: 0.1 10E3/UL (ref 0–0.7)
EOSINOPHIL NFR BLD AUTO: 2 %
EOSINOPHIL NFR BLD AUTO: 2 %
ERYTHROCYTE [DISTWIDTH] IN BLOOD BY AUTOMATED COUNT: 20.1 % (ref 10–15)
ERYTHROCYTE [DISTWIDTH] IN BLOOD BY AUTOMATED COUNT: 20.3 % (ref 10–15)
GFR SERPL CREATININE-BSD FRML MDRD: 39 ML/MIN/1.73M2
GLUCOSE BLDC GLUCOMTR-MCNC: 81 MG/DL (ref 70–99)
GLUCOSE BLDC GLUCOMTR-MCNC: 82 MG/DL (ref 70–99)
GLUCOSE SERPL-MCNC: 78 MG/DL (ref 70–99)
HCT VFR BLD AUTO: 25.3 % (ref 40–53)
HCT VFR BLD AUTO: 29.1 % (ref 40–53)
HGB BLD-MCNC: 7.7 G/DL (ref 13.3–17.7)
HGB BLD-MCNC: 8.6 G/DL (ref 13.3–17.7)
IMM GRANULOCYTES # BLD: 0.2 10E3/UL
IMM GRANULOCYTES # BLD: 0.3 10E3/UL
IMM GRANULOCYTES NFR BLD: 4 %
IMM GRANULOCYTES NFR BLD: 4 %
LYMPHOCYTES # BLD AUTO: 1.4 10E3/UL (ref 0.8–5.3)
LYMPHOCYTES # BLD AUTO: 1.6 10E3/UL (ref 0.8–5.3)
LYMPHOCYTES NFR BLD AUTO: 21 %
LYMPHOCYTES NFR BLD AUTO: 25 %
MCH RBC QN AUTO: 29.3 PG (ref 26.5–33)
MCH RBC QN AUTO: 30.1 PG (ref 26.5–33)
MCHC RBC AUTO-ENTMCNC: 29.6 G/DL (ref 31.5–36.5)
MCHC RBC AUTO-ENTMCNC: 30.4 G/DL (ref 31.5–36.5)
MCV RBC AUTO: 99 FL (ref 78–100)
MCV RBC AUTO: 99 FL (ref 78–100)
MONOCYTES # BLD AUTO: 1 10E3/UL (ref 0–1.3)
MONOCYTES # BLD AUTO: 1.1 10E3/UL (ref 0–1.3)
MONOCYTES NFR BLD AUTO: 16 %
MONOCYTES NFR BLD AUTO: 17 %
NEUTROPHILS # BLD AUTO: 3.2 10E3/UL (ref 1.6–8.3)
NEUTROPHILS # BLD AUTO: 4 10E3/UL (ref 1.6–8.3)
NEUTROPHILS NFR BLD AUTO: 51 %
NEUTROPHILS NFR BLD AUTO: 56 %
NRBC # BLD AUTO: 0 10E3/UL
NRBC # BLD AUTO: 0 10E3/UL
NRBC BLD AUTO-RTO: 0 /100
NRBC BLD AUTO-RTO: 0 /100
PHOSPHATE SERPL-MCNC: 3.1 MG/DL (ref 2.5–4.5)
PLATELET # BLD AUTO: 214 10E3/UL (ref 150–450)
PLATELET # BLD AUTO: 253 10E3/UL (ref 150–450)
POTASSIUM SERPL-SCNC: 4.1 MMOL/L (ref 3.4–5.3)
PROT SERPL-MCNC: 6.4 G/DL (ref 6.4–8.3)
RBC # BLD AUTO: 2.56 10E6/UL (ref 4.4–5.9)
RBC # BLD AUTO: 2.94 10E6/UL (ref 4.4–5.9)
SODIUM SERPL-SCNC: 134 MMOL/L (ref 136–145)
WBC # BLD AUTO: 6.2 10E3/UL (ref 4–11)
WBC # BLD AUTO: 7 10E3/UL (ref 4–11)

## 2023-01-14 PROCEDURE — 99207 PR CDG-CUT & PASTE-POTENTIAL IMPACT ON LEVEL: CPT | Performed by: HOSPITALIST

## 2023-01-14 PROCEDURE — 999N000123 HC STATISTIC OXYGEN O2DAILY TECH TIME

## 2023-01-14 PROCEDURE — 80053 COMPREHEN METABOLIC PANEL: CPT | Performed by: HOSPITALIST

## 2023-01-14 PROCEDURE — 94640 AIRWAY INHALATION TREATMENT: CPT | Mod: 76

## 2023-01-14 PROCEDURE — 999N000009 HC STATISTIC AIRWAY CARE

## 2023-01-14 PROCEDURE — 84100 ASSAY OF PHOSPHORUS: CPT | Performed by: HOSPITALIST

## 2023-01-14 PROCEDURE — 85025 COMPLETE CBC W/AUTO DIFF WBC: CPT | Performed by: HOSPITALIST

## 2023-01-14 PROCEDURE — 99232 SBSQ HOSP IP/OBS MODERATE 35: CPT | Performed by: HOSPITALIST

## 2023-01-14 PROCEDURE — 250N000013 HC RX MED GY IP 250 OP 250 PS 637: Performed by: NURSE PRACTITIONER

## 2023-01-14 PROCEDURE — 250N000009 HC RX 250: Performed by: NURSE PRACTITIONER

## 2023-01-14 PROCEDURE — 250N000013 HC RX MED GY IP 250 OP 250 PS 637: Performed by: HOSPITALIST

## 2023-01-14 PROCEDURE — 999N000157 HC STATISTIC RCP TIME EA 10 MIN

## 2023-01-14 PROCEDURE — 250N000009 HC RX 250: Performed by: HOSPITALIST

## 2023-01-14 PROCEDURE — 97129 THER IVNTJ 1ST 15 MIN: CPT | Mod: GO

## 2023-01-14 PROCEDURE — 250N000012 HC RX MED GY IP 250 OP 636 PS 637: Performed by: HOSPITALIST

## 2023-01-14 PROCEDURE — 94640 AIRWAY INHALATION TREATMENT: CPT

## 2023-01-14 PROCEDURE — 120N000017 HC R&B RESPIRATORY CARE

## 2023-01-14 RX ADMIN — NYSTATIN 500000 UNITS: 100000 SUSPENSION ORAL at 13:33

## 2023-01-14 RX ADMIN — TACROLIMUS 1 MG: 5 CAPSULE ORAL at 17:38

## 2023-01-14 RX ADMIN — WHITE PETROLATUM: 1.75 OINTMENT TOPICAL at 08:59

## 2023-01-14 RX ADMIN — ACETYLCYSTEINE 2 ML: 200 SOLUTION ORAL; RESPIRATORY (INHALATION) at 16:30

## 2023-01-14 RX ADMIN — LACOSAMIDE 100 MG: 10 SOLUTION ORAL at 08:55

## 2023-01-14 RX ADMIN — ACETYLCYSTEINE 2 ML: 200 SOLUTION ORAL; RESPIRATORY (INHALATION) at 08:33

## 2023-01-14 RX ADMIN — LACOSAMIDE 100 MG: 10 SOLUTION ORAL at 21:35

## 2023-01-14 RX ADMIN — ANORECTAL OINTMENT: 15.7; .44; 24; 20.6 OINTMENT TOPICAL at 11:49

## 2023-01-14 RX ADMIN — Medication 1 DROP: at 13:33

## 2023-01-14 RX ADMIN — INSULIN GLARGINE 20 UNITS: 100 INJECTION, SOLUTION SUBCUTANEOUS at 08:57

## 2023-01-14 RX ADMIN — LEVETIRACETAM 1000 MG: 100 SOLUTION ORAL at 21:33

## 2023-01-14 RX ADMIN — LACTULOSE 20 G: 20 SOLUTION ORAL at 08:57

## 2023-01-14 RX ADMIN — Medication 1 TABLET: at 08:58

## 2023-01-14 RX ADMIN — RIFAXIMIN 550 MG: 550 TABLET ORAL at 21:34

## 2023-01-14 RX ADMIN — RIFAXIMIN 550 MG: 550 TABLET ORAL at 08:57

## 2023-01-14 RX ADMIN — CHLORHEXIDINE GLUCONATE 0.12% ORAL RINSE 15 ML: 1.2 LIQUID ORAL at 21:35

## 2023-01-14 RX ADMIN — Medication 40 MG: at 21:35

## 2023-01-14 RX ADMIN — MIDODRINE HYDROCHLORIDE 5 MG: 5 TABLET ORAL at 17:38

## 2023-01-14 RX ADMIN — ALBUTEROL SULFATE 2.5 MG: 2.5 SOLUTION RESPIRATORY (INHALATION) at 16:30

## 2023-01-14 RX ADMIN — MICONAZOLE NITRATE ANTIFUNGAL POWDER: 2 POWDER TOPICAL at 11:49

## 2023-01-14 RX ADMIN — Medication 1 DROP: at 08:57

## 2023-01-14 RX ADMIN — Medication 1 DROP: at 21:33

## 2023-01-14 RX ADMIN — ACETAMINOPHEN 975 MG: 325 TABLET, FILM COATED ORAL at 06:01

## 2023-01-14 RX ADMIN — QUETIAPINE 75 MG: 25 TABLET ORAL at 21:34

## 2023-01-14 RX ADMIN — CHLORHEXIDINE GLUCONATE 0.12% ORAL RINSE 15 ML: 1.2 LIQUID ORAL at 08:57

## 2023-01-14 RX ADMIN — ALBUTEROL SULFATE 2.5 MG: 2.5 SOLUTION RESPIRATORY (INHALATION) at 08:33

## 2023-01-14 RX ADMIN — FOLIC ACID 1 MG: 1 TABLET ORAL at 08:59

## 2023-01-14 RX ADMIN — MIDODRINE HYDROCHLORIDE 5 MG: 5 TABLET ORAL at 12:51

## 2023-01-14 RX ADMIN — TACROLIMUS 1 MG: 5 CAPSULE ORAL at 08:57

## 2023-01-14 RX ADMIN — ACETAMINOPHEN 975 MG: 325 TABLET, FILM COATED ORAL at 21:34

## 2023-01-14 RX ADMIN — ALBUTEROL SULFATE 2.5 MG: 2.5 SOLUTION RESPIRATORY (INHALATION) at 13:18

## 2023-01-14 RX ADMIN — MIDODRINE HYDROCHLORIDE 5 MG: 5 TABLET ORAL at 08:57

## 2023-01-14 RX ADMIN — ACETAMINOPHEN 975 MG: 325 TABLET, FILM COATED ORAL at 13:33

## 2023-01-14 RX ADMIN — ALBUTEROL SULFATE 2.5 MG: 2.5 SOLUTION RESPIRATORY (INHALATION) at 19:36

## 2023-01-14 RX ADMIN — NYSTATIN 500000 UNITS: 100000 SUSPENSION ORAL at 08:57

## 2023-01-14 RX ADMIN — ACETYLCYSTEINE 2 ML: 200 SOLUTION ORAL; RESPIRATORY (INHALATION) at 19:36

## 2023-01-14 RX ADMIN — Medication 6 MG: at 21:33

## 2023-01-14 RX ADMIN — ACETYLCYSTEINE 2 ML: 200 SOLUTION ORAL; RESPIRATORY (INHALATION) at 13:18

## 2023-01-14 RX ADMIN — NYSTATIN 500000 UNITS: 100000 SUSPENSION ORAL at 21:35

## 2023-01-14 RX ADMIN — Medication 40 MG: at 08:58

## 2023-01-14 RX ADMIN — FLUCONAZOLE 200 MG: 200 TABLET ORAL at 21:34

## 2023-01-14 ASSESSMENT — ACTIVITIES OF DAILY LIVING (ADL)
ADLS_ACUITY_SCORE: 66
ADLS_ACUITY_SCORE: 68
ADLS_ACUITY_SCORE: 68
ADLS_ACUITY_SCORE: 66
ADLS_ACUITY_SCORE: 64
ADLS_ACUITY_SCORE: 68
ADLS_ACUITY_SCORE: 64

## 2023-01-14 NOTE — PROGRESS NOTES
[facility]    Medicine Progress Note - Hospitalist Service    Date of Admission:  12/29/2022  Brief history:  Blanco Osborne is an 57 year old male who is transferred from [facility] f to [facility] for  IV antibiotic treatment.  Patient has multiple medical problems including history of liver transplant 2016, PAF on chronic anticoagulation, history of DM2, CVA, HTN, CHRISTIAN on BiPAP, and history of alcohol abuse in the past causing liver damage ending with liver transplant.  About 6 weeks ago he had respiratory arrest and was diagnosed with infection with parainfluenza and strep pneumoniae and acute on chronic renal insufficiency ending with CRF needing 3/week hemodialysis.  During this period he was diagnosed with cirrhosis of transplanted liver and hyperammonia.  Currently he is on Lactulose and Rifaximin.        On 12/14/22, due to CIP, he was transferred to [facility] for the first time with Trach, PEG and Rt chest tube.    On 12/16/2022 patient was transferred back to [facility] due to respiratory insufficiency secondary to hydropneumothorax and drainage of 900 cc bloody pleural effusion, bloody stool and transfusion of 2 units PRBC.      On 12/20/2022 he had another episode of PEA arrest followed by CPR x2 minutes and possibly had seizure activity in [facility]. 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  He has been on ventilator and has improved to trach dome tolerating up to 6 hours so far. He had chest tube which was removed.    He had SBP with growth of lactobacillus and is currently on Unasyn which will be switched to Augmentin orally through 1/12/2023.    Pleural effusion grew Candida and is on micafungin.  He is also on  due to cirrhosis and hyperammonia.  He is anemic and is on Retacrit.   He is on lacosamide and Keppra due to history of seizure.  There is question of him drinking again  causing him cirrhosis of the transplanted liver.  LTAC course:  1/10/2023.  I attended patient care conference earlier today.  Worsening ascites compared to previous days.  Plan for paracentesis per IR tomorrow  1/11/2023.  Dialyzing today.  Plan for paracentesis tomorrow.  Otherwise continue current medical management.  No new changes at this time.  1/12.  Overnight patient had to be resuscitated with 1 L normal saline bolus after BPA sepsis fired noted to have lactic acid of 2.3.  This morning his hemoglobin is 6.6.  Typed screened and now transfusing packed red blood cells.  Paracentesis scheduled for tomorrow  1/13.  Patient had paracentesis and thoracentesis today.  About 7500 cc of ascitic fluid and 50 cc of pleural fluid yielded.  Cytology pending.Was transfused packed red blood cells yesterday most recent hemoglobin is 7.8.  1/14. Just about the same compared to yesterday. Hb fairly stable. No new changes at this time    Assessment & Plan   Principal Problem:    Acute on chronic respiratory failure with hypoxia (H)  Active Problems:    Acquired immunocompromised state (H)    Cirrhosis of liver (H)    NAFLD (nonalcoholic fatty liver disease)    Liver transplanted (H)    Immunosuppression (H)    Acute kidney injury (H)    Spontaneous bacterial peritonitis (H)    Critical illness myopathy    History of sudden cardiac arrest, PEA    Dependent on hemodialysis (H)    Acute anemia.  Status post 1 unit PRBCs transfusion 1/12/2023  -Repeat Hb 7.8, 1/12/2023.  -No evidence of overt bleeding.  -Anemia most likely secondary to critical illness/chronic disease  -Type screen and transfuse packed red blood cells to keep Hb> 7  -Monitor H&H.    Acute now chronic respiratory failure requiring tracheostomy. Initial event was parainfluenza and strep pneumo pneumonia.  Had failed extubation x2 and tracheostomy performed last hospitalization.  Had additional complications of bilateral pneumothoraces.  (Most recently was a  hydropneumothorax where fluid grew Candida.. Chest tube was placed and removed . Had decent tolerance for pressure support and trach dome but after few hours and trach dome 12/27 had a pretty significant episode of desaturation.   Plan:  -Wean ventilator per pulmonology.  -Good pulmonary toilet  -Tracheostomy care per RT    History PEA  Hypotension  PEA arrest prior to his previous admission and 1 episode of PEA associated with desaturation on 12/20.  No structural cardiac disease but does have A. Fib which neurologist thought might have been contributory to his embolic strokes.  Anticoagulation been restarted with stable labs although high risk for bleeding seconday to thrombocytopenia.  Also has developed baseline hypotension and is on midodrine 10 mg 3 times daily.  Plan:  Eliquis now on hold.    Continue current midodrine dose     Seizure after hypoxic event.  This is in the context of baseline multifactorial encephalopathy secondary to his ongoing critical illness and prior cardiac arrests and prior strokes and cirrhosis with hepatic encephalopathy. MRI of head of 12/20/22 reveals no PRES or leptomeningeal enhancement but scattered.  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   Plan:  - Continue with  Keppra  indefinitely.  Neurology follow-up.   - Anticoagulation indefinitely for secondary stroke prevention.  On hold since 1/6/2023 due to tracheal bleeding during suction.  Patient is currently in sinus rhythm.  And telemetry and monitored.  - Tylenol TID for headache.        ESRD: Now on iHD.  No return of renal function.  - MWF schedule      Infectious disease  1. 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 4.  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  Have had some conversations within our group and with transplant ID and general infectious disease.  General consensus is that ganciclovir probably could be discontinued  2. H/o Strep/Parainfluenza infection last hospitalization.  Completed treatment course  3. 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  4.  Lactobacillus growing in the broth 4 days after paracentesis.  Cell count was very low.  5.  Candida in left pleural fluid cultures.  Sensitivities not resulted.  6.  Immunosuppressed on tacrolimus.  Discussed with transplant service at the [place] who felt that given the absence of rejection on his most recent biopsy and ongoing issues with infection continuing with 1 twice daily tacrolimus is the best current option.  They have seen his subtherapeutic trough levels.  Plan:  1.  Confirm with transplant ID whether or not we can stop ganciclovir.  2.  Unasyn for 2 weeks total.  Change to Augmentin through 1/12/2023.  3.  At least 4 weeks of antimicrobial for the Candida.  If sensitive to fluconazole could de-escalate from micafungin.  4.  Continue tacrolimus as above and we are tapering steroids    Post transplant cirrhosis.  Main manifestations of this are hepatic encephalopathy and ascites.  Hepatologist at [place] felt that most likely reason for the cirrhosis was metabolic syndrome or alcohol intake.  There was no evidence of rejection on biopsy and there was some evidence of regeneration. Paracentesis done on 12/22/2022 with lack of bacillus in the broth indicating SBP low cell count was very low  Plan:  1.    Paracentesis today yielded about 7000 cc.  Cytology studies pending.  Continue intermittent paracentesis as needed.  2.  Tacrolimus 1 mg BID and tapering steroids   3.  Lactulose and Rifaximin as above  4.  Treat SBP for 2 weeks.  5.  He has an appointment in April with Dr. Simms if he is out of the hospital.    Diabetes mellitus type 2  1. DM.  Titrating insulin. He needs supplemental coverage.  2. He is on steroids which we are weaning  Plan:  -Continue with current regimen.   -Hypoglycemic protocol in place    Moderate malnutrition, protein and calorie type.  -Continue with tube feeds via PEG tube  -Nutritionist  consulted and following      Pancytopenia due cirrhosis, ESRD and hypersplenism. WBC count has improved.  However he needs frequent PRBC transfusion due Hb <7.0.   Patient is on anticoagulation for PAF.  Currently in sinus rhythm.  Is off anticoagulation due to tracheal bleeding.  Plan:  1.  Eliquis is on hold for now.     Code Status: Full     Diet: Adult Formula Bolus Feeding: Novasource Renal; Route: Gastrostomy; 3 times daily; Volume per Bolus: 250; mL(s); Additional free water (mL): 100 ml b/4 and after each bolus feeding if given; 125 ml/h x 2 h back up bolus feeding after each meal ONLY...  Soft & Bite Sized Diet (level 6) Liquidized/Moderately Thick (level 3)    DVT Prophylaxis: DOAC  Nixon Catheter: Not present  Central Lines: PRESENT      Cardiac Monitoring: ACTIVE order. Indication: Tachyarrhythmias, acute (48 hours)  Code Status: Full Code      Disposition Plan     Expected Discharge Date: 01/31/2023    Discharge Delays: Complex Discharge    Discharge Comments: Vent. Trach. Phase 1. PEG.  Rectal Tube.      The patient's care was discussed with the Bedside Nurse, Patient and Patient's Family.    Yahir Barcenas MD  Hospitalist Service  LT[facility]  Securely message with the Vocera Web Console (learn more here)  Text page via [employer] Paging/Directory     Clinically Significant Risk Factors          # Hypocalcemia: Lowest iCa = 1.14 mg/dL in last 2 days, will monitor and replace as appropriate  # Hypercalcemia: corrected calcium is >10.1, will monitor as appropriate    # Hypoalbuminemia: Lowest albumin = 1.8 g/dL at 12/29/2022  4:40 AM, will monitor as appropriate             # Severe Malnutrition: based on nutrition assessment      _____________________________________________________________________    Interval History   Patient in bed comfortably, just about the same compared to yesterday. No new complaints at this time.    Data reviewed today: I reviewed all medications, new labs and imaging results over the  last 24 hours. I personally reviewed no images or EKG's today.    Physical Exam   Vital Signs: Temp: 97.3  F (36.3  C) Temp src: Axillary BP: 105/67 Pulse: 94   Resp: 20 SpO2: 92 % O2 Device: Trach dome Oxygen Delivery: 20 LPM  Weight: 191 lbs 0 oz   General: Patient is lying in bed comfortably, he is awake alert oriented to person  HEENT: Head appears normocephalic atraumatic, eyes pupils appear equal round and reactive to light  Neck: Viable trach noted  Lungs: He has a normal respiratory effort, good air entry is noted no obvious wheezing noted  Heart: He has a good S1 and S2 no obvious murmurs, peripheral pulses are palpable bilaterally  Abdomen: Soft, distended, ascites noted, bowel sounds noted  Musculoskeletal: Good muscle tone, PICC line in right brachial artery, nails and digits appear acyanotic I did not examine his range of motion  Skin: No obvious rashes on inspection, warm to touch, please refer to nursing/wound care note for complete skin exam    Data   Recent Labs   Lab 01/14/23  0759 01/14/23  0615 01/14/23  0550 01/13/23  1753 01/13/23  1750 01/13/23  0553 01/13/23  0548 01/12/23  2245 01/12/23  0625 01/12/23  0621 01/11/23  0443 01/10/23  0608   WBC  --  6.2  --   --  7.0  --   --   --   --  4.7  --  4.8   HGB  --  7.7*  --  9.0* 8.6*  --   --  7.8*  --  6.6*   < > 7.2*   MCV  --  99  --   --  99  --   --   --   --  103*  --  102*   PLT  --  214  --   --  253  --   --   --   --  167  --  131*   NA  --  134*  --  136  --   --   --  137  --  136   < > 135*   POTASSIUM  --  4.1  --  3.7  --   --   --  3.5  --  3.5   < > 3.9   CHLORIDE  --  97*  --   --   --   --   --   --   --  99  --  96*   CO2  --  28  --   --   --   --   --   --   --  27  --  28   BUN  --  34.1*  --   --   --   --   --   --   --  45.6*  --  50.5*   CR  --  1.94*  --   --   --   --   --   --   --  2.08*  --  2.28*   ANIONGAP  --  9  --   --   --   --   --   --   --  10  --  11   KELLY  --  8.9  --   --   --   --   --   --   --  8.9   --  8.9   GLC 82 78 81 112  --   --    < > 118   < > 152*   < > 131*   ALBUMIN  --  1.9*  --   --   --   --   --   --   --  2.1*  --  2.0*   PROTTOTAL  --  6.4  --   --   --  6.5  --   --   --  6.4   < > 6.4   BILITOTAL  --  0.7  --   --   --   --   --   --   --  0.4  --  0.5   ALKPHOS  --  250*  --   --   --   --   --   --   --  368*  --  361*   ALT  --  11  --   --   --   --   --   --   --  11  --  11   AST  --  23  --   --   --   --   --   --   --  19  --  24    < > = values in this interval not displayed.     Medications     dextrose       - MEDICATION INSTRUCTIONS -         sodium chloride 0.9%  300 mL Hemodialysis Machine During Dialysis/CRRT (from stock)     acetaminophen  975 mg Per Feeding Tube Q8H BIJU    Or     acetaminophen  650 mg Rectal Q8H BIJU     acetylcysteine  2 mL Nebulization 4x daily     albuterol  2.5 mg Nebulization 4x daily     [Held by provider] apixaban ANTICOAGULANT  5 mg Oral or Feeding Tube BID     carboxymethylcellulose PF  1 drop Both Eyes TID     chlorhexidine  15 mL Mouth/Throat BID     epoetin mirta-epbx  40,000 Units Subcutaneous Weekly     fluconazole  200 mg Per Feeding Tube At Bedtime     folic acid  1 mg Oral or Feeding Tube Daily     heparin Lock (1000 units/mL High concentration)  1,900 Units Intracatheter During Dialysis/CRRT (from stock)     heparin Lock (1000 units/mL High concentration)  1,900 Units Intracatheter During Dialysis/CRRT (from stock)     insulin aspart  1-6 Units Subcutaneous Daily     insulin glargine  20 Units Subcutaneous Daily     lacosamide  100 mg Oral or Feeding Tube BID     lactulose  20 g Oral or Feeding Tube BID     levETIRAcetam  1,000 mg Oral or Feeding Tube Q24H     Melatonin  6 mg Oral or NG Tube At Bedtime     midodrine  10 mg Oral During Dialysis/CRRT (from stock)     midodrine  5 mg Oral or Feeding Tube TID w/meals     mineral oil-hydrophilic petrolatum   Topical Daily     multivitamin RENAL  1 tablet Per Feeding Tube Daily     nystatin   500,000 Units Swish & Swallow 4x Daily     pantoprazole  40 mg Per Feeding Tube BID     QUEtiapine  75 mg Oral or Feeding Tube At Bedtime     rifaximin  550 mg Per Feeding Tube BID     sodium chloride (PF)  10-30 mL Intracatheter Q8H     tacrolimus  1 mg Oral or Feeding Tube BID   In speaking valve

## 2023-01-14 NOTE — PLAN OF CARE
Problem: Artificial Airway  Goal: Effective Communication  Outcome: Progressing  Goal: Optimal Device Function  Outcome: Progressing  Intervention: Optimize Device Care and Function  Recent Flowsheet Documentation  Taken 1/14/2023 1632 by Dmitry Lei, RT  Airway Safety Measures: all equipment/monitors on and audible  Goal: Absence of Device-Related Skin or Tissue Injury  Outcome: Progressing   RT PROGRESS NOTE     DATA:     CURRENT SETTINGS: hftm             TRACH TYPE / SIZE:  # 7              MODE:   hftm/pmv              FIO2:   30%     ACTION:             THERAPIES:   Mucomyst / Proventil Q4             SUCTION: yes                          FREQUENCY:   x7                        AMOUNT:   copious                         CONSISTENCY:   thick                         COLOR:   yellow              SPONTANEOUS COUGH EFFORT/STRENGTH OF EFFORT (not elicited by suctioning): strong productive                               WEANING PHASE:   phase two vent weaning pathway                         WEAN MODE:    hftm with pmv days                         WEAN TIME:   started pmv 0840 am                         END WEAN REASON:   ongoing      RESPONSE:             BS:   coarse post suction              VITAL SIGNS:   91-94%, 97 heart rate, 18-22 resp. Rate              EMOTIONAL NEEDS / CONCERNS:  none                RISK FOR SELF DECANNULATION:  none                        RISK DUE TO:  n/a                        INTERVENTION/S IN PLACE IS/ARE:  n/a       NOTE / PLAN:    Goal Outcome Evaluation:  Continue current therapies treatments.

## 2023-01-14 NOTE — PLAN OF CARE
Problem: Artificial Airway  Goal: Effective Communication  Outcome: Adequate for Care Transition  Goal: Optimal Device Function  Outcome: Adequate for Care Transition  Intervention: Optimize Device Care and Function  Recent Flowsheet Documentation  Taken 1/13/2023 2324 by Ene Morrison, RT  Airway Safety Measures: all equipment/monitors on and audible  Taken 1/13/2023 1956 by Ene Morrison, RT  Airway Safety Measures: all equipment/monitors on and audible  Goal: Absence of Device-Related Skin or Tissue Injury  Outcome: Adequate for Care Transition   Goal Outcome Evaluation:  RT PROGRESS NOTE     DATA:     CURRENT SETTINGS:             TRACH TYPE / SIZE:  7 B[place]             MODE:   SP             FIO2:   30%/20lt TD     ACTION:             THERAPIES:   Mucomyst/ Albuterol             SUCTION:                           FREQUENCY:   3x                        AMOUNT:   Moderate                        CONSISTENCY:   Thick                        COLOR:   Pale             SPONTANEOUS COUGH EFFORT/STRENGTH OF EFFORT (not elicited by suctioning): good                              WEANING PHASE:   2                        WEAN MODE:    SP                        WEAN TIME:   NA                        END WEAN REASON:   NA     RESPONSE:             BS:   Coarse             VITAL SIGNS:   Vital signs:  Temp: 98  F (36.7  C) Temp src: Axillary BP: 110/64 Pulse: 111   Resp: 20 SpO2: 94 % O2 Device: Trach dome Oxygen Delivery: 20 LPM   Weight: 91.7 kg (202 lb 1.6 oz)  Estimated body mass index is 27.41 kg/m  as calculated from the following:    Height as of 11/12/22: 1.829 m (6').    Weight as of this encounter: 91.7 kg (202 lb 1.6 oz).                  EMOTIONAL NEEDS / CONCERNS:  Pain control                RISK FOR SELF DECANNULATION:  yes                        RISK DUE TO:  pain                        INTERVENTION/S IN PLACE IS/ARE:  Monitor       NOTE / PLAN:   Patient has been resting with little complaints. Cuff inflated  for sleeping.went 13 hours with PMV with no desaturations. Will continue to monitor for distress. PT has had to be suctioned several times for large amount of thick, white secretions.

## 2023-01-14 NOTE — PROGRESS NOTES
HD Progress Note    Assessment: Pt AAO x3, denied c.o, lungs diminished, no edema noted. Pt had paracentesis this am and able to remove 7500ml of fluid per RN.    Vascular Access: RIJ patent, aspirated and flushed well, however art line spasming when tx initiated and lines reversed. Dressing dry and intact, last changed 01/11/23. BFR at 350. Ports locked with Heparin post Tx.     Dialyzer/Rinse: 160NRe / lightly streaked, clots present  in the art chamber    HD time: 3hrs    HTreatment: HD fluid removal goal: Pt stable during tretment, SBP in 90's mostly. Crit line used for fluid management. Visiting with family.    Fluid Removed Total:   1000 ml            Net:  600 ml    Interventions: VS monitoring q 15 min and as needed.    Plan: HD q MWF

## 2023-01-14 NOTE — PLAN OF CARE
Problem: Plan of Care - These are the overarching goals to be used throughout the patient stay.    Goal: Plan of Care Review  Description: The Plan of Care Review/Shift note should be completed every shift.  The Outcome Evaluation is a brief statement about your assessment that the patient is improving, declining, or no change.  This information will be displayed automatically on your shift note.  Outcome: Progressing   Goal Outcome Evaluation:         Patient was calm and cooperative with cares, denied pain and slept most of the shift,  patient was repositioned every 2 hours, Digin care by pass  a large  amount of BM on this shift,  BS was  81 mg/dl, no coverage . Patient continued on Tele that read SinusTachy on this shift.

## 2023-01-14 NOTE — PLAN OF CARE
Goal Outcome Evaluation:         Patient alert and oriented X 2. BPA was flaring multiple times. Provider put multiple lab orders.    Lactic acid result was 1.1 and vital signs are stable. Will continue monitoring on patient status.    Wild BRICENO)

## 2023-01-14 NOTE — PLAN OF CARE
Problem: Anemia  Goal: Anemia Symptom Improvement  Outcome: Not Progressing  Intervention: Monitor and Manage Anemia  Recent Flowsheet Documentation  Taken 1/14/2023 1010 by Inna Guzman RN  Safety Promotion/Fall Prevention:    activity supervised    bed alarm on    fall prevention program maintained    clutter free environment maintained     Problem: Pain Acute  Goal: Optimal Pain Control and Function  Outcome: Progressing     Problem: Malnutrition  Goal: Improved Nutritional Intake  Outcome: Progressing     Problem: Enteral Nutrition  Goal: Absence of Aspiration Signs and Symptoms  Outcome: Progressing     Problem: Restraint, Nonviolent  Goal: Absence of Harm or Injury  Outcome: Progressing   Goal Outcome Evaluation:    Patient had no signs of pain at the start of shift, denied pain on and off this shift too, blood pressure was 111/73 at 0755, 105/67 (Midodrine was given), 94/58,  at 1227 (midodrine given too). Patient is now on a soft and bite-sized diet (level 6), moderately thickened liquids (level 3)-ate 35 % of lunch (refused a back-up tube feeding for being too full)-was given bolus tube feeding at breakfast (did not have diet order at this time). Patient still on cardiac monitoring-rhythm this morning was normal sinus, spouse visited,patient noted to be confused intermittently, phosphorus level is 3.1 today (recheck level in am), hemoglobin 7.7, .Patient's rhythm was sinus tachy at 1259, sepsis BPA fired (MD was informed)-will skip for now as directed

## 2023-01-14 NOTE — PHARMACY-CONSULT NOTE
"Pharmacy - Enteral Feeding Consult Note    I have reviewed the medications for interactions with tube feeding and have checked [facility] policy \"Medication Administration via Enteral Feeding Tubes, Tube Feeding Interactions\"     The following interactions were identified: None.   However there is a suspension of fluconazole available and we have it in stock if crushing that tablet becomes an issue.     Thank you for the consult.    Ki Cano RPH on 1/13/2023 at 9:13 PM    "

## 2023-01-15 ENCOUNTER — APPOINTMENT (OUTPATIENT)
Dept: SPEECH THERAPY | Facility: CLINIC | Age: 58
DRG: 207 | End: 2023-01-15
Attending: HOSPITALIST
Payer: COMMERCIAL

## 2023-01-15 LAB
BACTERIA BLD CULT: NO GROWTH
BACTERIA BLD CULT: NO GROWTH
GLUCOSE BLDC GLUCOMTR-MCNC: 116 MG/DL (ref 70–99)
GLUCOSE BLDC GLUCOMTR-MCNC: 65 MG/DL (ref 70–99)
GLUCOSE BLDC GLUCOMTR-MCNC: 95 MG/DL (ref 70–99)
PHOSPHATE SERPL-MCNC: 3.9 MG/DL (ref 2.5–4.5)

## 2023-01-15 PROCEDURE — 90935 HEMODIALYSIS ONE EVALUATION: CPT

## 2023-01-15 PROCEDURE — 250N000009 HC RX 250: Performed by: HOSPITALIST

## 2023-01-15 PROCEDURE — 92526 ORAL FUNCTION THERAPY: CPT | Mod: GN

## 2023-01-15 PROCEDURE — 999N000253 HC STATISTIC WEANING TRIALS

## 2023-01-15 PROCEDURE — 250N000009 HC RX 250: Performed by: NURSE PRACTITIONER

## 2023-01-15 PROCEDURE — 94640 AIRWAY INHALATION TREATMENT: CPT | Mod: 76

## 2023-01-15 PROCEDURE — 999N000009 HC STATISTIC AIRWAY CARE

## 2023-01-15 PROCEDURE — 99207 PR CDG-CUT & PASTE-POTENTIAL IMPACT ON LEVEL: CPT | Performed by: HOSPITALIST

## 2023-01-15 PROCEDURE — 94640 AIRWAY INHALATION TREATMENT: CPT

## 2023-01-15 PROCEDURE — 84100 ASSAY OF PHOSPHORUS: CPT | Performed by: HOSPITALIST

## 2023-01-15 PROCEDURE — 999N000157 HC STATISTIC RCP TIME EA 10 MIN

## 2023-01-15 PROCEDURE — 120N000017 HC R&B RESPIRATORY CARE

## 2023-01-15 PROCEDURE — 999N000123 HC STATISTIC OXYGEN O2DAILY TECH TIME

## 2023-01-15 PROCEDURE — 258N000001 HC RX 258: Performed by: HOSPITALIST

## 2023-01-15 PROCEDURE — 250N000013 HC RX MED GY IP 250 OP 250 PS 637: Performed by: NURSE PRACTITIONER

## 2023-01-15 PROCEDURE — 92507 TX SP LANG VOICE COMM INDIV: CPT | Mod: GN

## 2023-01-15 PROCEDURE — 99232 SBSQ HOSP IP/OBS MODERATE 35: CPT | Performed by: HOSPITALIST

## 2023-01-15 PROCEDURE — 250N000013 HC RX MED GY IP 250 OP 250 PS 637: Performed by: HOSPITALIST

## 2023-01-15 PROCEDURE — 250N000012 HC RX MED GY IP 250 OP 636 PS 637: Performed by: HOSPITALIST

## 2023-01-15 RX ADMIN — ALBUTEROL SULFATE 2.5 MG: 2.5 SOLUTION RESPIRATORY (INHALATION) at 19:51

## 2023-01-15 RX ADMIN — CHLORHEXIDINE GLUCONATE 0.12% ORAL RINSE 15 ML: 1.2 LIQUID ORAL at 08:49

## 2023-01-15 RX ADMIN — LEVETIRACETAM 1000 MG: 100 SOLUTION ORAL at 22:43

## 2023-01-15 RX ADMIN — ACETYLCYSTEINE 2 ML: 200 SOLUTION ORAL; RESPIRATORY (INHALATION) at 19:51

## 2023-01-15 RX ADMIN — MIDODRINE HYDROCHLORIDE 5 MG: 5 TABLET ORAL at 13:03

## 2023-01-15 RX ADMIN — Medication 40 MG: at 22:44

## 2023-01-15 RX ADMIN — ALBUTEROL SULFATE 2.5 MG: 2.5 SOLUTION RESPIRATORY (INHALATION) at 12:39

## 2023-01-15 RX ADMIN — MIDODRINE HYDROCHLORIDE 5 MG: 5 TABLET ORAL at 08:50

## 2023-01-15 RX ADMIN — INSULIN GLARGINE 20 UNITS: 100 INJECTION, SOLUTION SUBCUTANEOUS at 08:51

## 2023-01-15 RX ADMIN — QUETIAPINE 75 MG: 25 TABLET ORAL at 22:41

## 2023-01-15 RX ADMIN — Medication 1 DROP: at 14:37

## 2023-01-15 RX ADMIN — Medication 1 DROP: at 22:41

## 2023-01-15 RX ADMIN — ACETYLCYSTEINE 2 ML: 200 SOLUTION ORAL; RESPIRATORY (INHALATION) at 15:54

## 2023-01-15 RX ADMIN — ACETAMINOPHEN 975 MG: 325 TABLET, FILM COATED ORAL at 05:24

## 2023-01-15 RX ADMIN — LACTULOSE 20 G: 20 SOLUTION ORAL at 08:49

## 2023-01-15 RX ADMIN — Medication 1 DROP: at 08:50

## 2023-01-15 RX ADMIN — TACROLIMUS 1 MG: 5 CAPSULE ORAL at 08:49

## 2023-01-15 RX ADMIN — Medication 40 MG: at 08:49

## 2023-01-15 RX ADMIN — ACETAMINOPHEN 650 MG: 650 SOLUTION ORAL at 00:28

## 2023-01-15 RX ADMIN — FOLIC ACID 1 MG: 1 TABLET ORAL at 08:50

## 2023-01-15 RX ADMIN — CHLORHEXIDINE GLUCONATE 0.12% ORAL RINSE 15 ML: 1.2 LIQUID ORAL at 22:42

## 2023-01-15 RX ADMIN — ACETAMINOPHEN 975 MG: 325 TABLET, FILM COATED ORAL at 22:39

## 2023-01-15 RX ADMIN — Medication 6 MG: at 22:43

## 2023-01-15 RX ADMIN — Medication 1 TABLET: at 08:49

## 2023-01-15 RX ADMIN — NYSTATIN 500000 UNITS: 100000 SUSPENSION ORAL at 17:36

## 2023-01-15 RX ADMIN — DEXTROSE MONOHYDRATE 25 ML: 25 INJECTION, SOLUTION INTRAVENOUS at 08:20

## 2023-01-15 RX ADMIN — NYSTATIN 500000 UNITS: 100000 SUSPENSION ORAL at 13:03

## 2023-01-15 RX ADMIN — NYSTATIN 500000 UNITS: 100000 SUSPENSION ORAL at 08:50

## 2023-01-15 RX ADMIN — ALBUTEROL SULFATE 2.5 MG: 2.5 SOLUTION RESPIRATORY (INHALATION) at 15:54

## 2023-01-15 RX ADMIN — FLUCONAZOLE 200 MG: 200 TABLET ORAL at 22:50

## 2023-01-15 RX ADMIN — LACOSAMIDE 100 MG: 10 SOLUTION ORAL at 22:42

## 2023-01-15 RX ADMIN — ALBUTEROL SULFATE 2.5 MG: 2.5 SOLUTION RESPIRATORY (INHALATION) at 08:12

## 2023-01-15 RX ADMIN — ACETAMINOPHEN 975 MG: 325 TABLET, FILM COATED ORAL at 14:35

## 2023-01-15 RX ADMIN — RIFAXIMIN 550 MG: 550 TABLET ORAL at 22:50

## 2023-01-15 RX ADMIN — TACROLIMUS 1 MG: 5 CAPSULE ORAL at 17:36

## 2023-01-15 RX ADMIN — LACTULOSE 20 G: 20 SOLUTION ORAL at 22:43

## 2023-01-15 RX ADMIN — LACOSAMIDE 100 MG: 10 SOLUTION ORAL at 08:50

## 2023-01-15 RX ADMIN — ACETYLCYSTEINE 2 ML: 200 SOLUTION ORAL; RESPIRATORY (INHALATION) at 08:12

## 2023-01-15 RX ADMIN — MIDODRINE HYDROCHLORIDE 5 MG: 5 TABLET ORAL at 17:36

## 2023-01-15 RX ADMIN — NYSTATIN 500000 UNITS: 100000 SUSPENSION ORAL at 22:44

## 2023-01-15 RX ADMIN — WHITE PETROLATUM: 1.75 OINTMENT TOPICAL at 08:51

## 2023-01-15 RX ADMIN — RIFAXIMIN 550 MG: 550 TABLET ORAL at 08:50

## 2023-01-15 RX ADMIN — ACETYLCYSTEINE 2 ML: 200 SOLUTION ORAL; RESPIRATORY (INHALATION) at 12:39

## 2023-01-15 ASSESSMENT — ACTIVITIES OF DAILY LIVING (ADL)
ADLS_ACUITY_SCORE: 62
ADLS_ACUITY_SCORE: 64
ADLS_ACUITY_SCORE: 68
ADLS_ACUITY_SCORE: 68
ADLS_ACUITY_SCORE: 62
ADLS_ACUITY_SCORE: 68
ADLS_ACUITY_SCORE: 68
ADLS_ACUITY_SCORE: 62

## 2023-01-15 NOTE — PROGRESS NOTES
[facility]    Medicine Progress Note - Hospitalist Service    Date of Admission:  12/29/2022  Brief history:  Blanco Osborne is an 57 year old male who is transferred from [facility] f to [facility] for  IV antibiotic treatment.  Patient has multiple medical problems including history of liver transplant 2016, PAF on chronic anticoagulation, history of DM2, CVA, HTN, CHRISTIAN on BiPAP, and history of alcohol abuse in the past causing liver damage ending with liver transplant.  About 6 weeks ago he had respiratory arrest and was diagnosed with infection with parainfluenza and strep pneumoniae and acute on chronic renal insufficiency ending with CRF needing 3/week hemodialysis.  During this period he was diagnosed with cirrhosis of transplanted liver and hyperammonia.  Currently he is on Lactulose and Rifaximin.        On 12/14/22, due to CIP, he was transferred to [facility] for the first time with Trach, PEG and Rt chest tube.    On 12/16/2022 patient was transferred back to [facility] due to respiratory insufficiency secondary to hydropneumothorax and drainage of 900 cc bloody pleural effusion, bloody stool and transfusion of 2 units PRBC.      On 12/20/2022 he had another episode of PEA arrest followed by CPR x2 minutes and possibly had seizure activity in [facility]. 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  He has been on ventilator and has improved to trach dome tolerating up to 6 hours so far. He had chest tube which was removed.    He had SBP with growth of lactobacillus and is currently on Unasyn which will be switched to Augmentin orally through 1/12/2023.    Pleural effusion grew Candida and is on micafungin.  He is also on  due to cirrhosis and hyperammonia.  He is anemic and is on Retacrit.   He is on lacosamide and Keppra due to history of seizure.  There is question of him drinking again  causing him cirrhosis of the transplanted liver.  LTAC course:  1/10/2023-1/15/2023.  Dialyzing. 1/12,Overnight patient had to be resuscitated with 1 L normal saline bolus after BPA sepsis fired noted to have lactic acid of 2.3. Following morning Hb is 6.6.  Transfused 1 unit of packed red blood cells.1/13,Patient had paracentesis and thoracentesis about 7500 cc of ascitic fluid and 50 cc of pleural fluid yielded. Cytology pending.1/15. Just about the same compared to yesterday. Hb fairly stable. No new changes at this time, continue with current medical management    Assessment & Plan   Principal Problem:    Acute on chronic respiratory failure with hypoxia (H)  Active Problems:    Acquired immunocompromised state (H)    Cirrhosis of liver (H)    NAFLD (nonalcoholic fatty liver disease)    Liver transplanted (H)    Immunosuppression (H)    Acute kidney injury (H)    Spontaneous bacterial peritonitis (H)    Critical illness myopathy    History of sudden cardiac arrest, PEA    Dependent on hemodialysis (H)    Acute anemia.  Status post 1 unit PRBCs transfusion 1/12/2023  -Repeat Hb 7.8, 1/12/2023.  -No evidence of overt bleeding.  -Anemia most likely secondary to critical illness/chronic disease  -Type screen and transfuse packed red blood cells to keep Hb> 7  -Monitor H&H.    Acute now chronic respiratory failure requiring tracheostomy. Initial event was parainfluenza and strep pneumo pneumonia.  Had failed extubation x2 and tracheostomy performed last hospitalization.  Had additional complications of bilateral pneumothoraces.  (Most recently was a hydropneumothorax where fluid grew Candida.. Chest tube was placed and removed . Had decent tolerance for pressure support and trach dome but after few hours and trach dome 12/27 had a pretty significant episode of desaturation.   Plan:  -Wean ventilator per pulmonology.  -Good pulmonary toilet  -Tracheostomy care per RT    History PEA  Hypotension  PEA arrest prior to  his previous admission and 1 episode of PEA associated with desaturation on 12/20.  No structural cardiac disease but does have A. Fib which neurologist thought might have been contributory to his embolic strokes.  Anticoagulation been restarted with stable labs although high risk for bleeding seconday to thrombocytopenia.  Also has developed baseline hypotension and is on midodrine 10 mg 3 times daily.  Plan:  Eliquis now on hold.    Continue current midodrine dose     Seizure after hypoxic event.  This is in the context of baseline multifactorial encephalopathy secondary to his ongoing critical illness and prior cardiac arrests and prior strokes and cirrhosis with hepatic encephalopathy. MRI of head of 12/20/22 reveals no PRES or leptomeningeal enhancement but scattered.  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   Plan:  - Continue with  Keppra  indefinitely.  Neurology follow-up.   - Anticoagulation indefinitely for secondary stroke prevention.  On hold since 1/6/2023 due to tracheal bleeding during suction.  Patient is currently in sinus rhythm.  And telemetry and monitored.  - Tylenol TID for headache.        ESRD: Now on iHD.  No return of renal function.  - MWF schedule      Infectious disease  1. 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 4.  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  Have had some conversations within our group and with transplant ID and general infectious disease.  General consensus is that ganciclovir probably could be discontinued  2. H/o Strep/Parainfluenza infection last hospitalization. Completed treatment course  3. 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  4.  Lactobacillus growing in the broth 4 days after paracentesis.  Cell count was very low.  5.  Candida in left pleural fluid cultures.  Sensitivities not resulted.  6.  Immunosuppressed on tacrolimus.  Discussed with transplant service at the [facility] who felt that given the absence of  rejection on his most recent biopsy and ongoing issues with infection continuing with 1 twice daily tacrolimus is the best current option.  They have seen his subtherapeutic trough levels.  Plan:  1.  Confirm with transplant ID whether or not we can stop ganciclovir.  2.  Unasyn for 2 weeks total.  Change to Augmentin through 1/12/2023.  3.  At least 4 weeks of antimicrobial for the Candida.  If sensitive to fluconazole could de-escalate from micafungin.  4.  Continue tacrolimus as above and we are tapering steroids    Post transplant cirrhosis.  Main manifestations of this are hepatic encephalopathy and ascites.  Hepatologist at [place] felt that most likely reason for the cirrhosis was metabolic syndrome or alcohol intake.  There was no evidence of rejection on biopsy and there was some evidence of regeneration. Paracentesis done on 12/22/2022 with lack of bacillus in the broth indicating SBP low cell count was very low  Plan:  1.    Paracentesis today yielded about 7000 cc.  Cytology studies pending.  Continue intermittent paracentesis as needed.  2.  Tacrolimus 1 mg BID and tapering steroids   3.  Lactulose and Rifaximin as above  4.  Treat SBP for 2 weeks.  5.  He has an appointment in April with Dr. Simms if he is out of the hospital.    Diabetes mellitus type 2  1. DM.  Titrating insulin. He needs supplemental coverage.  2. He is on steroids which we are weaning  Plan:  -Continue with current regimen.   -Hypoglycemic protocol in place    Moderate malnutrition, protein and calorie type.  -Continue with tube feeds via PEG tube  -Nutritionist consulted and following      Pancytopenia due cirrhosis, ESRD and hypersplenism. WBC count has improved.  However he needs frequent PRBC transfusion due Hb <7.0.   Patient is on anticoagulation for PAF.  Currently in sinus rhythm.  Is off anticoagulation due to tracheal bleeding.  Plan:  1.  Eliquis is on hold for now.     Code Status: Full     Diet: Adult Formula Bolus  Feeding: Novasource Renal; Route: Gastrostomy; 3 times daily; Volume per Bolus: 250; mL(s); Additional free water (mL): 100 ml b/4 and after each bolus feeding if given; 125 ml/h x 2 h back up bolus feeding after each meal ONLY...  Soft & Bite Sized Diet (level 6) Liquidized/Moderately Thick (level 3)    DVT Prophylaxis: DOAC  Nixon Catheter: Not present  Central Lines: PRESENT      Cardiac Monitoring: None  Code Status: Full Code      Disposition Plan     Expected Discharge Date: 01/31/2023    Discharge Delays: Complex Discharge    Discharge Comments: Vent. Trach. Phase 1. PEG.  Rectal Tube.      The patient's care was discussed with the Bedside Nurse, Patient and Patient's Family.    Yahir Barcenas MD  Hospitalist Service  LTACH  Securely message with the Vocera Web Console (learn more here)  Text page via [employer] Paging/Directory     Clinically Significant Risk Factors           # Hypercalcemia: corrected calcium is >10.1, will monitor as appropriate    # Hypoalbuminemia: Lowest albumin = 1.8 g/dL at 12/29/2022  4:40 AM, will monitor as appropriate             # Severe Malnutrition: based on nutrition assessment      _____________________________________________________________________    Interval History   No new events overnight per RN.  Patient is resting in bed comfortably.  Just about the same compared to yesterday.  No new complaints noted.    Data reviewed today: I reviewed all medications, new labs and imaging results over the last 24 hours. I personally reviewed no images or EKG's today.    Physical Exam   Vital Signs: Temp: 98  F (36.7  C) Temp src: Oral BP: 110/64 Pulse: 99   Resp: 20 SpO2: 93 % O2 Device: Trach dome Oxygen Delivery: 20 LPM  Weight: 186 lbs 0 oz   General: Patient is lying in bed comfortably, he is awake alert oriented to person  HEENT: Head appears normocephalic atraumatic, eyes pupils appear equal round and reactive to light  Neck: Viable trach noted  Lungs: He has a normal respiratory  effort, good air entry is noted no obvious wheezing noted  Heart: He has a good S1 and S2 no obvious murmurs, peripheral pulses are palpable bilaterally  Abdomen: Soft, distended, ascites noted, bowel sounds noted  Musculoskeletal: Good muscle tone, PICC line in right brachial artery, nails and digits appear acyanotic I did not examine his range of motion  Skin: No obvious rashes on inspection, warm to touch, please refer to nursing/wound care note for complete skin exam    Data   Recent Labs   Lab 01/15/23  1139 01/15/23  0844 01/15/23  0809 01/14/23  0759 01/14/23  0615 01/14/23  0550 01/13/23  1753 01/13/23  1750 01/13/23  0553 01/13/23  0548 01/12/23  2245 01/12/23  0625 01/12/23  0621 01/11/23  0443 01/10/23  0608   WBC  --   --   --   --  6.2  --   --  7.0  --   --   --   --  4.7  --  4.8   HGB  --   --   --   --  7.7*  --  9.0* 8.6*  --   --  7.8*  --  6.6*   < > 7.2*   MCV  --   --   --   --  99  --   --  99  --   --   --   --  103*  --  102*   PLT  --   --   --   --  214  --   --  253  --   --   --   --  167  --  131*   NA  --   --   --   --  134*  --  136  --   --   --  137  --  136   < > 135*   POTASSIUM  --   --   --   --  4.1  --  3.7  --   --   --  3.5  --  3.5   < > 3.9   CHLORIDE  --   --   --   --  97*  --   --   --   --   --   --   --  99  --  96*   CO2  --   --   --   --  28  --   --   --   --   --   --   --  27  --  28   BUN  --   --   --   --  34.1*  --   --   --   --   --   --   --  45.6*  --  50.5*   CR  --   --   --   --  1.94*  --   --   --   --   --   --   --  2.08*  --  2.28*   ANIONGAP  --   --   --   --  9  --   --   --   --   --   --   --  10  --  11   KELLY  --   --   --   --  8.9  --   --   --   --   --   --   --  8.9  --  8.9   GLC 95 116* 65*   < > 78   < > 112  --   --    < > 118   < > 152*   < > 131*   ALBUMIN  --   --   --   --  1.9*  --   --   --   --   --   --   --  2.1*  --  2.0*   PROTTOTAL  --   --   --   --  6.4  --   --   --  6.5  --   --   --  6.4   < > 6.4   BILITOTAL  --    --   --   --  0.7  --   --   --   --   --   --   --  0.4  --  0.5   ALKPHOS  --   --   --   --  250*  --   --   --   --   --   --   --  368*  --  361*   ALT  --   --   --   --  11  --   --   --   --   --   --   --  11  --  11   AST  --   --   --   --  23  --   --   --   --   --   --   --  19  --  24    < > = values in this interval not displayed.     Medications     dextrose       - MEDICATION INSTRUCTIONS -         sodium chloride 0.9%  300 mL Hemodialysis Machine During Dialysis/CRRT (from stock)     acetaminophen  975 mg Per Feeding Tube Q8H BIJU    Or     acetaminophen  650 mg Rectal Q8H BIJU     acetylcysteine  2 mL Nebulization 4x daily     albuterol  2.5 mg Nebulization 4x daily     [Held by provider] apixaban ANTICOAGULANT  5 mg Oral or Feeding Tube BID     carboxymethylcellulose PF  1 drop Both Eyes TID     chlorhexidine  15 mL Mouth/Throat BID     epoetin mirta-epbx  40,000 Units Subcutaneous Weekly     fluconazole  200 mg Per Feeding Tube At Bedtime     folic acid  1 mg Oral or Feeding Tube Daily     heparin Lock (1000 units/mL High concentration)  1,900 Units Intracatheter During Dialysis/CRRT (from stock)     heparin Lock (1000 units/mL High concentration)  1,900 Units Intracatheter During Dialysis/CRRT (from stock)     insulin aspart  1-6 Units Subcutaneous Daily     insulin glargine  20 Units Subcutaneous Daily     lacosamide  100 mg Oral or Feeding Tube BID     lactulose  20 g Oral or Feeding Tube BID     levETIRAcetam  1,000 mg Oral or Feeding Tube Q24H     Melatonin  6 mg Oral or NG Tube At Bedtime     midodrine  10 mg Oral During Dialysis/CRRT (from stock)     midodrine  5 mg Oral or Feeding Tube TID w/meals     mineral oil-hydrophilic petrolatum   Topical Daily     multivitamin RENAL  1 tablet Per Feeding Tube Daily     nystatin  500,000 Units Swish & Swallow 4x Daily     pantoprazole  40 mg Per Feeding Tube BID     QUEtiapine  75 mg Oral or Feeding Tube At Bedtime     rifaximin  550 mg Per Feeding  Tube BID     sodium chloride (PF)  10-30 mL Intracatheter Q8H     tacrolimus  1 mg Oral or Feeding Tube BID   In speaking valve

## 2023-01-15 NOTE — PLAN OF CARE
Goal Outcome Evaluation:      Plan of Care Reviewed With: patient    Overall Patient Progress: improvingOverall Patient Progress: improving    Outcome Evaluation: patient is eating well. He got up in his chair for dinner and he ate over 50% of his dinner and did not require his back up bolus tube feeding. He had two friends visit him tonight to watch the football game which he appeared to enjoy.    Patient's rectal tube bypassing large amounts frequently. Cuff inflation checked and found to be over-inflated to 50 ml rather than 45 ml. Tube was repositioned to be correctly placed just beyond the sphincter and re-inflated to correct amount. Lactulose held per parameters of more than 3 BM per day since he is currently having constant liquid diarrhea. Vitals are stable. Pain controlled for the most part with his scheduled tylenol.      Eboni Stinson RN

## 2023-01-15 NOTE — PLAN OF CARE
Problem: Artificial Airway  Goal: Effective Communication  Outcome: Progressing  Goal: Optimal Device Function  Outcome: Progressing  Goal: Absence of Device-Related Skin or Tissue Injury  Outcome: Progressing   RT PROGRESS NOTE     DATA:     CURRENT SETTINGS:             TRACH TYPE / SIZE:  #7 Bivona, placed 1/9/23              MODE:  TM 20L/ PMV             FIO2:   30%     ACTION:             THERAPIES:  Albuterol/ Mucomyst neb x 3 QID given on scheduled time             SUCTION:                           FREQUENCY:   3                        AMOUNT:   copious/ small/ large                        CONSISTENCY:   thick                         COLOR:  white             SPONTANEOUS COUGH EFFORT/STRENGTH OF EFFORT (not elicited by suctioning): not observed                              WEANING PHASE:   2                        WEAN MODE:  30% TM 20L/ PMV                         WEAN TIME:   PMV since 0825                         END WEAN REASON:       RESPONSE:             BS:   clear decreased on L side, coarse and rhonchi on R side - clear to clear decreased after Sx             VITAL SIGNS:   94-97%, HR 93-98, RR 20             EMOTIONAL NEEDS / CONCERNS:  none                RISK FOR SELF DECANNULATION:  none                        RISK DUE TO:  n/a                        INTERVENTION/S IN PLACE IS/ARE:  n/a       NOTE / PLAN:   continue current plan of care - TM with PMV during the day, PMV off and cuff up at night.

## 2023-01-15 NOTE — PLAN OF CARE
Problem: Artificial Airway  Goal: Effective Communication  Outcome: Adequate for Care Transition  Goal: Optimal Device Function  Outcome: Adequate for Care Transition  Intervention: Optimize Device Care and Function  Recent Flowsheet Documentation  Taken 1/14/2023 2329 by Ene Morrison, RT  Airway Safety Measures: all equipment/monitors on and audible  Taken 1/14/2023 1937 by Ene Morrison, RT  Airway Safety Measures: all equipment/monitors on and audible  Goal: Absence of Device-Related Skin or Tissue Injury  Outcome: Adequate for Care Transition   Goal Outcome Evaluation:  RT PROGRESS NOTE     DATA:     CURRENT SETTINGS:             TRACH TYPE / SIZE:  7 Bivona             MODE:   SP             FIO2:   20lt/30%     ACTION:             THERAPIES:   Mucomyst/Albuterol             SUCTION:                           FREQUENCY:   6x                        AMOUNT:   Large/copious                         CONSISTENCY:   Thick                        COLOR:   white             SPONTANEOUS COUGH EFFORT/STRENGTH OF EFFORT (not elicited by suctioning): weak                              WEANING PHASE:   2                        WEAN MODE:    SP                        WEAN TIME:   11 hours                        END WEAN REASON:   Rest     RESPONSE:             BS:   Coarse             VITAL SIGNS:   Vital signs:  Temp: 97.9  F (36.6  C) Temp src: Oral BP: 134/66 Pulse: 104   Resp: 20 SpO2: 96 % O2 Device: Trach dome Oxygen Delivery: 20 LPM   Weight: 86.6 kg (191 lb)  Estimated body mass index is 25.9 kg/m  as calculated from the following:    Height as of 11/12/22: 1.829 m (6').    Weight as of this encounter: 86.6 kg (191 lb).                  EMOTIONAL NEEDS / CONCERNS:  Communication                RISK FOR SELF DECANNULATION:  yes                        RISK DUE TO:  anxiety                        INTERVENTION/S IN PLACE IS/ARE:  Monitor       NOTE / PLAN:   Patient received his breathing TX's with no issues. Coarse breath  sounds with large amount of secretions from Trach. PMV removed and balloon inflated for rest. Was called to the room several times at the beginning of shift to suction. Breath sounds improved temporarily after suctioning. Will continue to follow for need of care.

## 2023-01-16 ENCOUNTER — APPOINTMENT (OUTPATIENT)
Dept: PHYSICAL THERAPY | Facility: CLINIC | Age: 58
DRG: 207 | End: 2023-01-16
Attending: HOSPITALIST
Payer: COMMERCIAL

## 2023-01-16 ENCOUNTER — APPOINTMENT (OUTPATIENT)
Dept: OCCUPATIONAL THERAPY | Facility: CLINIC | Age: 58
DRG: 207 | End: 2023-01-16
Attending: HOSPITALIST
Payer: COMMERCIAL

## 2023-01-16 LAB
ANION GAP SERPL CALCULATED.3IONS-SCNC: 12 MMOL/L (ref 7–15)
BUN SERPL-MCNC: 54.7 MG/DL (ref 6–20)
CALCIUM SERPL-MCNC: 9.4 MG/DL (ref 8.6–10)
CHLORIDE SERPL-SCNC: 94 MMOL/L (ref 98–107)
CREAT SERPL-MCNC: 3.18 MG/DL (ref 0.67–1.17)
DEPRECATED HCO3 PLAS-SCNC: 27 MMOL/L (ref 22–29)
GFR SERPL CREATININE-BSD FRML MDRD: 22 ML/MIN/1.73M2
GLUCOSE BLDC GLUCOMTR-MCNC: 110 MG/DL (ref 70–99)
GLUCOSE SERPL-MCNC: 96 MG/DL (ref 70–99)
HGB BLD-MCNC: 7.9 G/DL (ref 13.3–17.7)
MAGNESIUM SERPL-MCNC: 2.2 MG/DL (ref 1.7–2.3)
PHOSPHATE SERPL-MCNC: 4.1 MG/DL (ref 2.5–4.5)
POTASSIUM SERPL-SCNC: 4 MMOL/L (ref 3.4–5.3)
SODIUM SERPL-SCNC: 133 MMOL/L (ref 136–145)
TACROLIMUS BLD-MCNC: 3.8 UG/L (ref 5–15)
TME LAST DOSE: ABNORMAL H
TME LAST DOSE: ABNORMAL H

## 2023-01-16 PROCEDURE — 80197 ASSAY OF TACROLIMUS: CPT | Performed by: HOSPITALIST

## 2023-01-16 PROCEDURE — 99233 SBSQ HOSP IP/OBS HIGH 50: CPT | Performed by: FAMILY MEDICINE

## 2023-01-16 PROCEDURE — 250N000013 HC RX MED GY IP 250 OP 250 PS 637: Performed by: NURSE PRACTITIONER

## 2023-01-16 PROCEDURE — 83735 ASSAY OF MAGNESIUM: CPT | Performed by: HOSPITALIST

## 2023-01-16 PROCEDURE — 999N000157 HC STATISTIC RCP TIME EA 10 MIN

## 2023-01-16 PROCEDURE — 84100 ASSAY OF PHOSPHORUS: CPT | Performed by: HOSPITALIST

## 2023-01-16 PROCEDURE — 120N000017 HC R&B RESPIRATORY CARE

## 2023-01-16 PROCEDURE — 97110 THERAPEUTIC EXERCISES: CPT | Mod: GO

## 2023-01-16 PROCEDURE — 999N000123 HC STATISTIC OXYGEN O2DAILY TECH TIME

## 2023-01-16 PROCEDURE — 99232 SBSQ HOSP IP/OBS MODERATE 35: CPT

## 2023-01-16 PROCEDURE — 85018 HEMOGLOBIN: CPT | Performed by: NURSE PRACTITIONER

## 2023-01-16 PROCEDURE — 90935 HEMODIALYSIS ONE EVALUATION: CPT

## 2023-01-16 PROCEDURE — 999N000009 HC STATISTIC AIRWAY CARE

## 2023-01-16 PROCEDURE — 80048 BASIC METABOLIC PNL TOTAL CA: CPT | Performed by: HOSPITALIST

## 2023-01-16 PROCEDURE — 634N000001 HC RX 634: Performed by: PHYSICIAN ASSISTANT

## 2023-01-16 PROCEDURE — 250N000013 HC RX MED GY IP 250 OP 250 PS 637: Performed by: HOSPITALIST

## 2023-01-16 PROCEDURE — 250N000009 HC RX 250: Performed by: HOSPITALIST

## 2023-01-16 PROCEDURE — 250N000012 HC RX MED GY IP 250 OP 636 PS 637: Performed by: HOSPITALIST

## 2023-01-16 PROCEDURE — 99232 SBSQ HOSP IP/OBS MODERATE 35: CPT | Performed by: NURSE PRACTITIONER

## 2023-01-16 PROCEDURE — 97110 THERAPEUTIC EXERCISES: CPT | Mod: GP | Performed by: PHYSICAL THERAPIST

## 2023-01-16 PROCEDURE — 999N000253 HC STATISTIC WEANING TRIALS

## 2023-01-16 PROCEDURE — 94640 AIRWAY INHALATION TREATMENT: CPT

## 2023-01-16 PROCEDURE — 250N000009 HC RX 250: Performed by: NURSE PRACTITIONER

## 2023-01-16 PROCEDURE — 94640 AIRWAY INHALATION TREATMENT: CPT | Mod: 76

## 2023-01-16 PROCEDURE — 250N000011 HC RX IP 250 OP 636: Performed by: NURSE PRACTITIONER

## 2023-01-16 RX ADMIN — CHLORHEXIDINE GLUCONATE 0.12% ORAL RINSE 15 ML: 1.2 LIQUID ORAL at 20:34

## 2023-01-16 RX ADMIN — Medication 6 MG: at 20:34

## 2023-01-16 RX ADMIN — EPOETIN ALFA-EPBX 40000 UNITS: 40000 INJECTION, SOLUTION INTRAVENOUS; SUBCUTANEOUS at 18:29

## 2023-01-16 RX ADMIN — WHITE PETROLATUM: 1.75 OINTMENT TOPICAL at 10:08

## 2023-01-16 RX ADMIN — Medication 1 TABLET: at 09:58

## 2023-01-16 RX ADMIN — NYSTATIN 500000 UNITS: 100000 SUSPENSION ORAL at 09:58

## 2023-01-16 RX ADMIN — HEPARIN SODIUM 1900 UNITS: 1000 INJECTION INTRAVENOUS; SUBCUTANEOUS at 10:48

## 2023-01-16 RX ADMIN — NYSTATIN 500000 UNITS: 100000 SUSPENSION ORAL at 16:49

## 2023-01-16 RX ADMIN — ACETYLCYSTEINE 2 ML: 200 SOLUTION ORAL; RESPIRATORY (INHALATION) at 19:52

## 2023-01-16 RX ADMIN — Medication 40 MG: at 20:35

## 2023-01-16 RX ADMIN — Medication 1 DROP: at 09:58

## 2023-01-16 RX ADMIN — ALBUTEROL SULFATE 2.5 MG: 2.5 SOLUTION RESPIRATORY (INHALATION) at 13:32

## 2023-01-16 RX ADMIN — LACOSAMIDE 100 MG: 10 SOLUTION ORAL at 20:35

## 2023-01-16 RX ADMIN — ACETYLCYSTEINE 2 ML: 200 SOLUTION ORAL; RESPIRATORY (INHALATION) at 16:05

## 2023-01-16 RX ADMIN — MIDODRINE HYDROCHLORIDE 5 MG: 5 TABLET ORAL at 07:41

## 2023-01-16 RX ADMIN — ALBUTEROL SULFATE 2.5 MG: 2.5 SOLUTION RESPIRATORY (INHALATION) at 19:51

## 2023-01-16 RX ADMIN — LACTULOSE 20 G: 20 SOLUTION ORAL at 09:58

## 2023-01-16 RX ADMIN — MIDODRINE HYDROCHLORIDE 5 MG: 5 TABLET ORAL at 16:48

## 2023-01-16 RX ADMIN — TACROLIMUS 1 MG: 5 CAPSULE ORAL at 10:00

## 2023-01-16 RX ADMIN — ACETYLCYSTEINE 2 ML: 200 SOLUTION ORAL; RESPIRATORY (INHALATION) at 13:32

## 2023-01-16 RX ADMIN — Medication 40 MG: at 09:58

## 2023-01-16 RX ADMIN — FOLIC ACID 1 MG: 1 TABLET ORAL at 09:58

## 2023-01-16 RX ADMIN — LACTULOSE 20 G: 20 SOLUTION ORAL at 20:35

## 2023-01-16 RX ADMIN — TACROLIMUS 1 MG: 5 CAPSULE ORAL at 18:29

## 2023-01-16 RX ADMIN — LEVETIRACETAM 1000 MG: 100 SOLUTION ORAL at 20:34

## 2023-01-16 RX ADMIN — NYSTATIN 500000 UNITS: 100000 SUSPENSION ORAL at 20:34

## 2023-01-16 RX ADMIN — Medication 1 DROP: at 20:35

## 2023-01-16 RX ADMIN — RIFAXIMIN 550 MG: 550 TABLET ORAL at 10:32

## 2023-01-16 RX ADMIN — Medication 1 DROP: at 13:23

## 2023-01-16 RX ADMIN — RIFAXIMIN 550 MG: 550 TABLET ORAL at 22:01

## 2023-01-16 RX ADMIN — ALBUTEROL SULFATE 2.5 MG: 2.5 SOLUTION RESPIRATORY (INHALATION) at 16:05

## 2023-01-16 RX ADMIN — MIDODRINE HYDROCHLORIDE 5 MG: 5 TABLET ORAL at 11:16

## 2023-01-16 RX ADMIN — ACETAMINOPHEN 975 MG: 325 TABLET, FILM COATED ORAL at 13:23

## 2023-01-16 RX ADMIN — FLUCONAZOLE 200 MG: 200 TABLET ORAL at 20:36

## 2023-01-16 RX ADMIN — ACETAMINOPHEN 975 MG: 325 TABLET, FILM COATED ORAL at 06:18

## 2023-01-16 RX ADMIN — INSULIN GLARGINE 20 UNITS: 100 INJECTION, SOLUTION SUBCUTANEOUS at 09:58

## 2023-01-16 RX ADMIN — NYSTATIN 500000 UNITS: 100000 SUSPENSION ORAL at 13:23

## 2023-01-16 RX ADMIN — ACETYLCYSTEINE 2 ML: 200 SOLUTION ORAL; RESPIRATORY (INHALATION) at 07:40

## 2023-01-16 RX ADMIN — QUETIAPINE 75 MG: 25 TABLET ORAL at 20:36

## 2023-01-16 RX ADMIN — LACOSAMIDE 100 MG: 10 SOLUTION ORAL at 09:58

## 2023-01-16 RX ADMIN — ACETAMINOPHEN 975 MG: 325 TABLET, FILM COATED ORAL at 22:00

## 2023-01-16 RX ADMIN — CHLORHEXIDINE GLUCONATE 0.12% ORAL RINSE 15 ML: 1.2 LIQUID ORAL at 09:58

## 2023-01-16 RX ADMIN — HEPARIN SODIUM 1900 UNITS: 1000 INJECTION INTRAVENOUS; SUBCUTANEOUS at 10:47

## 2023-01-16 RX ADMIN — ALBUTEROL SULFATE 2.5 MG: 2.5 SOLUTION RESPIRATORY (INHALATION) at 07:39

## 2023-01-16 ASSESSMENT — ACTIVITIES OF DAILY LIVING (ADL)
ADLS_ACUITY_SCORE: 66
ADLS_ACUITY_SCORE: 69
ADLS_ACUITY_SCORE: 66
ADLS_ACUITY_SCORE: 63
ADLS_ACUITY_SCORE: 67
ADLS_ACUITY_SCORE: 66
ADLS_ACUITY_SCORE: 66
ADLS_ACUITY_SCORE: 69

## 2023-01-16 NOTE — PROCEDURES
HEMODIALYSIS NOTE       TREATMENT TIME: 3 hrs     DIALYZER: [id number]              RINSE: Good/clear     Cumulative UF + I/O : 2200 ml  UF TOTAL(net) : 1800 ml     BVP: 63.6        WEIGHT PRE: 83.6 kg        ACCESS PRE: R CVC with clean, dry and intact dressing. Both ports aspirated and flushed easily. Arterial spasm/collapsing with treatment initiation. Lines reversed. Resolved. Provider notified. BFR @ 400       RUN SUMMARY: Pt. A/OX3. Tolerated treatment well. Pt was hemodynamically stable during treatment. Scheduled midodrine given x2. Had an episode of hypotension toward end of treatment. Pt. Asymptomatic. Goal adjusted per critline and hemodynamics. K 3  Bath per protocol. Seen by Jie SELLERS during dialysis. Report given to  Sarah Lao RN.   See HD flow sheet for all data.       ACCESS POST:Heparin instilled to fill volumes for dwell. Clamped, capped and secured.        INTERVENTIONS:  Vital signs Q 15 minutes and PRN  Goal adjustment per critline and hemodynamics.    PLAN : Per treatment team

## 2023-01-16 NOTE — PLAN OF CARE
Problem: Mechanical Ventilation Invasive  Goal: Effective Communication  Intervention: Ensure Effective Communication  Problem: Pain Acute  Goal: Optimal Pain Control and Function  Outcome: Progressing  Intervention: Prevent or Manage Pain   Goal Outcome Evaluation:    Pt was in bed during shift.  Pt  fed self eating less than 25% of meal at supper time. Pt took small bites and taking a long time between bites.   Bolus tube feeding of 250ML completed with 100ML flush befor and after feeding.  Pt wife visited during afternoon pt was sleepy and very quiet during visit.  Pt had complaints of pain 7 of 10 before scheduled tylenol.  Pain was reduced on recheck to 2 of 10.

## 2023-01-16 NOTE — PLAN OF CARE
Problem: Mechanical Ventilation Invasive  Goal: Effective Communication  Intervention: Ensure Effective Communication  Recent Flowsheet Documentation  Taken 1/16/2023 0900 by Sarah Nicolas RN  Family/Support System Care: support provided  Trust Relationship/Rapport:    care explained    questions answered    questions encouraged     Problem: Plan of Care - These are the overarching goals to be used throughout the patient stay.    Goal: Plan of Care Review  Description: The Plan of Care Review/Shift note should be completed every shift.  The Outcome Evaluation is a brief statement about your assessment that the patient is improving, declining, or no change.  This information will be displayed automatically on your shift note.  Outcome: Progressing     Problem: Hemodialysis  Goal: Safe, Effective Therapy Delivery  Outcome: Progressing  Intervention: Optimize Device Care and Function  Recent Flowsheet Documentation  Taken 1/16/2023 0900 by Sarah Nicolas RN  Medication Review/Management: medications reviewed   Goal Outcome Evaluation: Patient is alert and oriented to place and person. He had dialysis this am. Pt reported back and abd discomfort, he is on scheduled tylenol, pt reported feeling better when he was up in chair. Pt has rectal tube, bypassed x1 large loose bm. Picc line is intact and patent. Explained meds and care plan to patient. Pt has an abrasion or skin tear on groin. mepilex applied and informed wound nurse.

## 2023-01-16 NOTE — PLAN OF CARE
Problem: Malnutrition  Goal: Improved Nutritional Intake  Outcome: Not Progressing   Goal Outcome Evaluation:  Pt eating small amounts PO and not wanting back up bolus feedings. Encouraged PO intake and discussed importance of bolus feedings for assistance. Started Ensure and Gelatein once/ each. See nutrition note.    Rocio Mccallum  Dietetic Intern

## 2023-01-16 NOTE — PLAN OF CARE
Problem: Artificial Airway  Goal: Effective Communication  Outcome: Progressing  Goal: Optimal Device Function  Outcome: Progressing     RT PROGRESS NOTE     DATA:     CURRENT SETTINGS:             TRACH TYPE / SIZE:  #7 Bivona, placed 1/9/23              MODE:  TM 20L/ PMV             FIO2:   30%     ACTION:             THERAPIES:  Albuterol/ Mucomyst neb x 3 QID given on scheduled time             SUCTION:                           FREQUENCY:   3                        AMOUNT:   copious/ small/ large                        CONSISTENCY:   thick                         COLOR:  white             SPONTANEOUS COUGH EFFORT/STRENGTH OF EFFORT (not elicited by suctioning): not observed                              WEANING PHASE:   3                        WEAN MODE:  capped/RA                        WEAN TIME:   since 1 hr 25 min                         END WEAN REASON:  capped trach in the chair only     RESPONSE:             BS:  coarse to slightly coarse  - clear decreased after Sx             VITAL SIGNS:   95-98%, HR , RR 20             EMOTIONAL NEEDS / CONCERNS:  none                RISK FOR SELF DECANNULATION:  none                        RISK DUE TO:  n/a                        INTERVENTION/S IN PLACE IS/ARE:  n/a       NOTE / PLAN: order: cap trach in the chair, increased PMV into the night. Pt is going to wear PMV tonight for 14 hrs. Continue current plan of care - cap trach in the chair, TM with PMV during the day for 14 hrs, PMV off and cuff up at night.

## 2023-01-16 NOTE — PHARMACY-ADMISSION MEDICATION HISTORY
Tacrolimus level = 3.8, drawn 1/16 @ 0635.   Last dose given 1/15 @ 1736.   This is a 13-hour level.  Current dose: 1mg bid   Tacrolimus  goal: 3-5  per transplant team.  New or change in medications with potentially significant interactions with  Tacrolimus: None  Recommendations from transplant team, coordinator, Jerrica Almaraz  no change, continue Mon levels  Orders placed  : N/A       Mariah Iglesias RP,BCCCP, BCCP

## 2023-01-16 NOTE — PROGRESS NOTES
[facility]    Medicine Progress Note - Hospitalist Service    Date of Admission:  12/29/2022    Brief history:  Blanco Osborne is an 57 year old male who is transferred from [facility] f to [facility] for  IV antibiotic treatment.  Patient has multiple medical problems including history of liver transplant 2016, PAF on chronic anticoagulation, history of DM2, CVA, HTN, CHRISTIAN on BiPAP, and history of alcohol abuse in the past causing liver damage ending with liver transplant.  About 6 weeks ago he had respiratory arrest and was diagnosed with infection with parainfluenza and strep pneumoniae and acute on chronic renal insufficiency ending with CRF needing 3/week hemodialysis.  During this period he was diagnosed with cirrhosis of transplanted liver and hyperammonia.  Currently he is on Lactulose and Rifaximin.        On 12/14/22, due to CIP, he was transferred to [facility] for the first time with Trach, PEG and Rt chest tube.    On 12/16/2022 patient was transferred back to [facility] due to respiratory insufficiency secondary to hydropneumothorax and drainage of 900 cc bloody pleural effusion, bloody stool and transfusion of 2 units PRBC.    On 12/20/2022 he had another episode of PEA arrest followed by CPR x2 minutes and possibly had seizure activity in [facility]. 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  He has been on ventilator and has improved to trach dome tolerating up to 6 hours so far. He had chest tube which was removed.    He had SBP with growth of lactobacillus and is currently on Unasyn which will be switched to Augmentin orally through 1/12/2023.    Pleural effusion grew Candida and is on micafungin.  He is also on  due to cirrhosis and hyperammonia.  He is anemic and is on Retacrit.   He is on lacosamide and Keppra due to history of seizure.  There is question of him drinking again  causing him cirrhosis of the transplanted liver.    LTAC course:  1/10/2023-1/15/2023.  Dialyzing. 1/12,Overnight patient had to be resuscitated with 1 L normal saline bolus after BPA sepsis fired noted to have lactic acid of 2.3. Following morning Hb is 6.6.  Transfused 1 unit of packed red blood cells.1/13,Patient had paracentesis and thoracentesis about 7500 cc of ascitic fluid and 50 cc of pleural fluid yielded. Cytology pending.1/15. Just about the same compared to yesterday. Hb fairly stable. No new changes at this time, continue with current medical management    1/16/23: Hgb 7.9, stable.  Remains off apixaban due to tracheal bleeding.    Assessment & Plan   Principal Problem:    Acute on chronic respiratory failure with hypoxia (H)  Active Problems:    Acquired immunocompromised state (H)    Cirrhosis of liver (H)    NAFLD (nonalcoholic fatty liver disease)    Liver transplanted (H)    Immunosuppression (H)    Acute kidney injury (H)    Spontaneous bacterial peritonitis (H)    Critical illness myopathy    History of sudden cardiac arrest, PEA    Dependent on hemodialysis (H)    Acute now chronic respiratory failure requiring tracheostomy. Initial event was parainfluenza and strep pneumo pneumonia.  Had failed extubation x2 and tracheostomy performed last hospitalization.  Had additional complications of bilateral pneumothoraces.  (Most recently was a hydropneumothorax where fluid grew Candida.. Chest tube was placed and removed . Had decent tolerance for pressure support and trach dome but after few hours and trach dome 12/27 had a pretty significant episode of desaturation.   Plan:  -Wean ventilator per pulmonology.  -Good pulmonary toilet  -Tracheostomy care per RT    History PEA  Hypotension  PEA arrest prior to his previous admission and 1 episode of PEA associated with desaturation on 12/20.  No structural cardiac disease but does have A. Fib which neurologist thought might have been contributory to his  embolic strokes.  Anticoagulation been restarted with stable labs although high risk for bleeding seconday to thrombocytopenia.  Also has developed baseline hypotension and is on midodrine 10 mg 3 times daily.  Plan:  Eliquis now on hold due to tracheal bleeding.    Continue current midodrine dose     Seizure after hypoxic event.  This is in the context of baseline multifactorial encephalopathy secondary to his ongoing critical illness and prior cardiac arrests and prior strokes and cirrhosis with hepatic encephalopathy. MRI of head of 12/20/22 reveals no PRES or leptomeningeal enhancement but scattered.  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   Plan:  - Continue with  Keppra  indefinitely.  Neurology follow-up.   - Anticoagulation indefinitely for secondary stroke prevention.  On hold since 1/6/2023 due to tracheal bleeding during suction.  Patient is currently in sinus rhythm.  And telemetry and monitored.  - Tylenol TID for headache.        ESRD: Now on iHD.  No return of renal function.  - MWF schedule     Acute anemia.  Status post 1 unit PRBCs transfusion 1/12/2023  -Repeat Hb 7.8, 1/12/2023 and remains stable  -No evidence of overt bleeding.  -Anemia most likely secondary to critical illness/chronic disease  -Type screen and transfuse packed red blood cells to keep Hb> 7  -Monitor H&H.     Infectious disease  At prior hospital:  1. 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 4.  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  Have had some conversations within our group and with transplant ID and general infectious disease.  General consensus is that ganciclovir probably could be discontinued  2. H/o Strep/Parainfluenza infection last hospitalization. Completed treatment course  3. 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  4.  Lactobacillus growing in the broth 4 days after paracentesis.  Cell count was very low.  5.  Candida in left pleural fluid cultures.  Sensitivities not  resulted.  6.  Immunosuppressed on tacrolimus.  Discussed with transplant service at the [place] who felt that given the absence of rejection on his most recent biopsy and ongoing issues with infection continuing with 1 twice daily tacrolimus is the best current option.  They have seen his subtherapeutic trough levels.  Plan:  -Unasyn for 2 weeks total.  Changed to Augmentin, finished on 1/12/2023.  -At least 4 weeks of antimicrobial for the Candida.  If sensitive to fluconazole could de-escalate from micafungin.  -Continue tacrolimus as above and we are tapering steroids  -Remains off ganciclovir    Post liver transplant cirrhosis.  Main manifestations of this are hepatic encephalopathy and ascites.  Hepatologist at [place] felt that most likely reason for the cirrhosis was metabolic syndrome or alcohol intake.  There was no evidence of rejection on biopsy and there was some evidence of regeneration. Paracentesis done on 12/22/2022 with lack of bacillus in the broth indicating SBP low cell count was very low  Plan:  1.    Paracentesis 1/13/2023 yielded about 7000 cc.  Cytology studies pending.  Ascites fluid cultures NGTD.  Continue intermittent paracentesis as needed.  2.  Tacrolimus 1 mg BID and tapering steroids   3.  Lactulose and Rifaximin as above  4.  Treat SBP for 2 weeks.  5.  He has an appointment in April with Dr. Simms if he is out of the hospital.    Diabetes mellitus type 2  1. DM.  Titrating insulin. He needs supplemental coverage.  2. He is on steroids which are weaning  Plan:  -Continue with current regimen.   -Hypoglycemic protocol in place    Moderate malnutrition, protein and calorie type.  -Continue with tube feeds via PEG tube  -Nutritionist consulted and following      Pancytopenia due cirrhosis, ESRD and hypersplenism. WBC count has improved.  However he needs frequent PRBC transfusion due Hb <7.0.   Patient is on anticoagulation for PAF.  Currently in sinus rhythm.  Is off  anticoagulation due to tracheal bleeding.  Plan:  1.  Eliquis is on hold for now.     Code Status: Full     Diet: Adult Formula Bolus Feeding: Novasource Renal; Route: Gastrostomy; 3 times daily; Volume per Bolus: 250; mL(s); Additional free water (mL): 100 ml b/4 and after each bolus feeding if given; 125 ml/h x 2 h back up bolus feeding after each meal ONLY...  Soft & Bite Sized Diet (level 6) Liquidized/Moderately Thick (level 3)    DVT Prophylaxis: DOAC  Nixon Catheter: Not present  Central Lines: PRESENT      Cardiac Monitoring: None  Code Status: Full Code      Disposition Plan     Expected Discharge Date: 01/31/2023    Discharge Delays: Complex Discharge    Discharge Comments: Vent. Trach. Phase 1. PEG.  Rectal Tube.      The patient's care was discussed with the Bedside Nurse, Patient and Patient's Family.    SPARKLE CRUZ MD  Hospitalist Service  [facility]  Securely message with the Vocera Web Console (learn more here)  Text page via "[employer] Paging/Directory     Clinically Significant Risk Factors              # Hypoalbuminemia: Lowest albumin = 1.8 g/dL at 12/29/2022  4:40 AM, will monitor as appropriate             # Severe Malnutrition: based on nutrition assessment      _____________________________________________________________________    Interval History   No new events overnight per RN.  .  Eating in bed receiving HD in the room.    Tolerating some diet per patient's report  Wife at bedside.    Glucose in low to mid 100s.      Data reviewed today: I reviewed all medications, new labs and imaging results over the last 24 hours. I personally reviewed.    Physical Exam   Vital Signs: Temp: 97.1  F (36.2  C) Temp src: Axillary BP: 108/71 Pulse: 97   Resp: 20 SpO2: 100 % O2 Device: Trach dome Oxygen Delivery: 20 LPM  Weight: 184 lbs 4.8 oz   General: Alert, oriented.  Frail and debilitated.  HEENT: Normal conjunctiva, anicteric.  Neck: Supple.  Trach noted  Lungs: Clear to auscultation  Heart: S1, S2.  No  murmurs  Abdomen: Soft, distended, ascites noted, bowel sounds noted  Extremities: No peripheral edema.  PICC line in place.  Skin: No rash or ulcers.    Data   Recent Labs   Lab 01/16/23  0635 01/16/23  0624 01/15/23  1139 01/14/23  0759 01/14/23  0615 01/14/23  0550 01/13/23  1753 01/13/23  1750 01/13/23  0553 01/12/23  0625 01/12/23  0621   WBC  --   --   --   --  6.2  --   --  7.0  --   --  4.7   HGB 7.9*  --   --   --  7.7*  --  9.0* 8.6*  --    < > 6.6*   MCV  --   --   --   --  99  --   --  99  --   --  103*   PLT  --   --   --   --  214  --   --  253  --   --  167   *  --   --   --  134*  --  136  --   --    < > 136   POTASSIUM 4.0  --   --   --  4.1  --  3.7  --   --    < > 3.5   CHLORIDE 94*  --   --   --  97*  --   --   --   --   --  99   CO2 27  --   --   --  28  --   --   --   --   --  27   BUN 54.7*  --   --   --  34.1*  --   --   --   --   --  45.6*   CR 3.18*  --   --   --  1.94*  --   --   --   --   --  2.08*   ANIONGAP 12  --   --   --  9  --   --   --   --   --  10   KELLY 9.4  --   --   --  8.9  --   --   --   --   --  8.9   GLC 96 110* 95   < > 78   < > 112  --   --    < > 152*   ALBUMIN  --   --   --   --  1.9*  --   --   --   --   --  2.1*   PROTTOTAL  --   --   --   --  6.4  --   --   --  6.5  --  6.4   BILITOTAL  --   --   --   --  0.7  --   --   --   --   --  0.4   ALKPHOS  --   --   --   --  250*  --   --   --   --   --  368*   ALT  --   --   --   --  11  --   --   --   --   --  11   AST  --   --   --   --  23  --   --   --   --   --  19    < > = values in this interval not displayed.     Medications     dextrose       - MEDICATION INSTRUCTIONS -         sodium chloride 0.9%  300 mL Hemodialysis Machine During Dialysis/CRRT (from stock)     acetaminophen  975 mg Per Feeding Tube Q8H BIJU    Or     acetaminophen  650 mg Rectal Q8H BIJU     acetylcysteine  2 mL Nebulization 4x daily     albuterol  2.5 mg Nebulization 4x daily     [Held by provider] apixaban ANTICOAGULANT  5 mg Oral or  Feeding Tube BID     carboxymethylcellulose PF  1 drop Both Eyes TID     chlorhexidine  15 mL Mouth/Throat BID     epoetin mirta-epbx  40,000 Units Subcutaneous Weekly     fluconazole  200 mg Per Feeding Tube At Bedtime     folic acid  1 mg Oral or Feeding Tube Daily     heparin Lock (1000 units/mL High concentration)  1,900 Units Intracatheter During Dialysis/CRRT (from stock)     heparin Lock (1000 units/mL High concentration)  1,900 Units Intracatheter During Dialysis/CRRT (from stock)     insulin aspart  1-6 Units Subcutaneous Daily     insulin glargine  20 Units Subcutaneous Daily     lacosamide  100 mg Oral or Feeding Tube BID     lactulose  20 g Oral or Feeding Tube BID     levETIRAcetam  1,000 mg Oral or Feeding Tube Q24H     Melatonin  6 mg Oral or NG Tube At Bedtime     midodrine  10 mg Oral During Dialysis/CRRT (from stock)     midodrine  5 mg Oral or Feeding Tube TID w/meals     mineral oil-hydrophilic petrolatum   Topical Daily     multivitamin RENAL  1 tablet Per Feeding Tube Daily     nystatin  500,000 Units Swish & Swallow 4x Daily     pantoprazole  40 mg Per Feeding Tube BID     QUEtiapine  75 mg Oral or Feeding Tube At Bedtime     rifaximin  550 mg Per Feeding Tube BID     sodium chloride (PF)  10-30 mL Intracatheter Q8H     tacrolimus  1 mg Oral or Feeding Tube BID   In speaking valve

## 2023-01-16 NOTE — PLAN OF CARE
Goal Outcome Evaluation: VSS, alert, confused to time and situation. Pt is [place]. [employer] tubing had small hole, in tubing, [employer] replaced with SW[employer] nurse. 45cc saline into rectal tuube retention port. System still bypassing small- moderate amount of bm. Pt denies pain. Encouraged pt to vent his feelings. Continue to monitor

## 2023-01-16 NOTE — PROGRESS NOTES
Pulmonary Progress Note    Admit Date: 12/29/2022  CODE: Full Code    Assessment/Plan:   57yoM with liver transplant(2016), recent prolonged hospitalization 11/12-12/15 for PEA cardiac arrest, Strep/Parainfluenza pneumonia with ARDS, MODS, ?aspergillus pna s/p 1 week of voriconazole, right PTX requiring chest tube since removed.  Now HD dependent, s/p trach/PEG.  Course further c/b new left PTX requiring chest tube(since removed), b/l pleural effusions, ascites, mucus plugging, PEA arrest and seizure on 12/20, pleural fluid cxs growing candida parapsilosis & ascites cx growing lactobacillus on antimicrobials, and CSF panel positive for HHV6 ultimately decided to not treat.  Admitted to [facility] 12/29.     Discussion of pertinent problems:  Acute hypoxic/hypercapnic respiratory failure s/p trach: Liberated from the vent on 1/9 with acceptable VBG on trach dome.  Tolerating speaking valve during the day.        Hx bilateral pleural effusions s/p left thora 11/26.  S/p right thora 1/13 - only 50cc removed d/t complex fluid; exudative; pleural LDH very high, very low glucose, pH 7.50, high cell count.  This would qualify as a complicated parapneumonic effusion.  Clinically stable on trach dome, no fevers or leukocytosis, procal downtrending.  Cytology pending and cultures with NGTD thus far.   Reviewed with Dr. Chu.     Tracheostomy in place: 8 Shiley placed 11/29.  Changed to 6 Shiley prox xlt 12/8, unclear why.  Downsized to 7 Bivona 1/9 without issue.      Dysphagia s/p PEG tube.  BDT 12/30 with delayed aspiration.  Tolerating PMV trials well after trach downsize.  Repeat BDT 1/10 with no immediate and faint delayed aspiration.  Aspiration on VFSS 1/13 ->started soft/bite sized w/ moderately thick liquids     Candida in left pleural fluid cultures: started on Micafungin switched to fluconazole 1/3 based on cx sensitivities.  - Cont fluconazole for ~4 week total course based on imaging per ID; tentative end date  ~1/23    LORETTA on MWF HD: followed by Nephrology     S/p Liver transplant with post transplant cirrhosis & ascites s/p paracentesis 12/22 & again on 1/13(7.5L): on Tacro and prednisone    CHRISTIAN on home BiPAP.  Managed currently with trach and vent     lactobacillus peritonitis s/p course Unasyn transitioned to PO Augmentin; ended 1/11    Seizure after hypoxic event on 12/20 with subsequent seizure 12/21 during dialysis:   - Keppra and vimpat indefinitely     Multifocal acute ischemic strokes on MRI(12/20).    - Eliquis now on hold d/t acute anemia     Hx b/l pneumothoraces: right CT first placed 11/16, removed, then replaced by IR on 12/12, removed again 12/19.  Left chest tube placed 12/16, ?hemithorax, CT removed on 12/19.  Stable on recent imaging     Hx mucus plugging of bronchi.  Bronch on 12/20 with minimal secretions.  Mucus plug suctioned 1/4 and restarted on metanebs, stopped again d/t bloody secretions     Acute on chronic Anemia: s/p multiple blood transfusions, last on 1/13.  Apixaban on hold since 1/4.  Initially with bloody secretions which have since resolved.  Hgb stable    Plan:   - Phase 3 weans: Cap day when up in the chair, otherwise TM/PMV day with TM/Cuff up night.  Wean PMV slowly into the night. Goal today for total time on PMV/cuff ~14hr.  - follow pleural cultures and cytology - NGTD thus far on cultures  - consult ID to help weigh in if antibiotics warranted for complicated parapneumonic effusion   - If he spikes a temp or has any clinical decline, low threshold to send out to higher level of care for surgery consult and likely placement of right chest tube with lytics   - Apixaban: bloody secretions have resolved.  Restart per [facility] discretion   - Bronchial hygiene with Albuterol + mucomyst nebs QID   - Repeat chest imaging ~1/23 before consider stopping Fluconazole      Henry Fenton, SHAWN  Pulmonary Medicine  [facility]  Pager [phone]    Subjective/Interim Events:   - denies sob, n/v,  fever, chills.  No new pains.  Tired   - no overnight events    Tracheal secretions:  Overnight - x6, lg, thick  Yesterday - x3, sm/lg, thick    Cough strength: weak/moderate    Current phase of ventilator weaning pathway:  Phase 2    Ventilator weaning results: continuous TM since 1/10    PMV:  1/15: 11hr  1/14: 11hr  1/13: 13hr    Clinical status discussed today with respiratory therapist, patient, pt's brother, wife(asking about starting probiotics)    Medications:       dextrose       - MEDICATION INSTRUCTIONS -         sodium chloride 0.9%  300 mL Hemodialysis Machine During Dialysis/CRRT (from stock)     acetaminophen  975 mg Per Feeding Tube Q8H BIJU    Or     acetaminophen  650 mg Rectal Q8H BIJU     acetylcysteine  2 mL Nebulization 4x daily     albuterol  2.5 mg Nebulization 4x daily     [Held by provider] apixaban ANTICOAGULANT  5 mg Oral or Feeding Tube BID     carboxymethylcellulose PF  1 drop Both Eyes TID     chlorhexidine  15 mL Mouth/Throat BID     epoetin mirta-epbx  40,000 Units Subcutaneous Weekly     fluconazole  200 mg Per Feeding Tube At Bedtime     folic acid  1 mg Oral or Feeding Tube Daily     heparin Lock (1000 units/mL High concentration)  1,900 Units Intracatheter During Dialysis/CRRT (from stock)     heparin Lock (1000 units/mL High concentration)  1,900 Units Intracatheter During Dialysis/CRRT (from stock)     insulin aspart  1-6 Units Subcutaneous Daily     insulin glargine  20 Units Subcutaneous Daily     lacosamide  100 mg Oral or Feeding Tube BID     lactulose  20 g Oral or Feeding Tube BID     levETIRAcetam  1,000 mg Oral or Feeding Tube Q24H     Melatonin  6 mg Oral or NG Tube At Bedtime     midodrine  10 mg Oral During Dialysis/CRRT (from stock)     midodrine  5 mg Oral or Feeding Tube TID w/meals     mineral oil-hydrophilic petrolatum   Topical Daily     multivitamin RENAL  1 tablet Per Feeding Tube Daily     nystatin  500,000 Units Swish & Swallow 4x Daily     pantoprazole  40 mg  Per Feeding Tube BID     QUEtiapine  75 mg Oral or Feeding Tube At Bedtime     rifaximin  550 mg Per Feeding Tube BID     sodium chloride (PF)  10-30 mL Intracatheter Q8H     tacrolimus  1 mg Oral or Feeding Tube BID         Exam/Data:   Vitals  BP 93/51   Pulse 92   Temp 97.1  F (36.2  C) (Axillary)   Resp 20   Wt 83.6 kg (184 lb 4.8 oz)   SpO2 100%   BMI 25.00 kg/m       I/O last 3 completed shifts:  In: 700 [P.O.:320; I.V.:60; NG/GT:320]  Out: 700 [Stool:700]  Weight change: -3.039 kg (-6 lb 11.2 oz)    FiO2 (%): 30 %  Resp: 20      EXAM:  Gen: no distress sitting in chair with trach cap on   HEENT: NT, trach midline/intact, no stridor   CV: RRR  Resp: CTAB anteriorly, diminished b/l lower lobes with crackles on right side; non-labored   Abd: soft, non tender, BS hypoactive  Skin: no visible rashes or lesions, scattered ecchymosis(improved), fragile   Ext: mild pedal edema, weak  Neuro: alert, follows commands    ROS:  A 10-system review was obtained and is negative with the exception of the symptoms noted above.    Labs:  Basic Metabolic Panel  Recent Labs   Lab 01/16/23  0635 01/16/23  0624 01/15/23  1139 01/15/23  0844 01/14/23  0759 01/14/23  0615 01/14/23  0550 01/13/23  1753 01/13/23  0548 01/12/23  2245 01/12/23  0625 01/12/23  0621 01/11/23  0443 01/10/23  0608   *  --   --   --   --  134*  --  136  --  137  --  136   < > 135*   POTASSIUM 4.0  --   --   --   --  4.1  --  3.7  --  3.5  --  3.5   < > 3.9   CHLORIDE 94*  --   --   --   --  97*  --   --   --   --   --  99  --  96*   CO2 27  --   --   --   --  28  --   --   --   --   --  27  --  28   BUN 54.7*  --   --   --   --  34.1*  --   --   --   --   --  45.6*  --  50.5*   CR 3.18*  --   --   --   --  1.94*  --   --   --   --   --  2.08*  --  2.28*   GLC 96 110* 95 116*   < > 78   < > 112   < > 118   < > 152*   < > 131*    < > = values in this interval not displayed.     CBC RESULTS: Recent Labs   Lab Test 01/09/23  0656 01/05/23  6954  01/04/23  0623 12/27/22  0420   WBC  --   --   --  7.5   RBC  --   --   --  2.76*   HGB 7.3*   < > 6.3* 8.8*   HCT  --   --   --  29.4*   MCV  --   --   --  107*   MCH  --   --   --  31.9   MCHC  --   --   --  29.9*   RDW  --   --   --  18.7*   PLT  --   --  58* 63*    < > = values in this interval not displayed.      Venous Blood Gas  Recent Labs   Lab 01/13/23  1753 01/12/23  2245 01/12/23  0442 01/12/23  0003   PHV 7.41 7.41 7.40 7.45   PCO2V 52* 47 49 44   PO2V 29 33 34 29   HCO3V 30 28 29 29   MITA 7.7* 5.5* 5.5* 6.7*        RADIOLOGY: Personally reviewed; radiology read below   CTA CHEST ABDOMEN PELVIS W CONTRAST 1/6/2023                                                                   IMPRESSION:  1. No obvious active bleeding identified.  2. Large amount of abdominal/pelvic ascites, splenomegaly, and scattered varicosities. The above findings would be most typical for sequelae of portal venous hypertension.  3. Significant stranding of the omental fat and intraperitoneal fat, this is nonspecific, but may be reactive or neoplastic in nature.  4. Percutaneous gastrostomy tube, tracheostomy, and the rectal tube appear to be in good position.  5. Central catheter with tip in the inferior right atrium.  6. Mild to moderate right pleural effusion and mild left pleural effusion, both of these appear to be loculated given the peripheral enhancement. Presumed sequelae of prior hematoma seen in the right lung.  7. Minute amount of gas seen associated with the left pleural effusion and this is presumed to be iatrogenic in nature.  8. Slight filling defect within the lower trachea most typical for slight mucous.  9. Enlargement of the main pulmonary artery presumed to represent sequelae of pulmonary arterial hypertension.   ----------------  CTA NECK WITH CONTRAST 1/6/2023     INDICATION: Tracheostomy in place, worsening bloody tracheal secretions, rule out tracheoinnominate fistula.                                                                    IMPRESSION:   1.  No significant stenosis or dissection.  2.  No definite findings to suggest a tracheoinnominate artery fistula.  ----------------------  XR CHEST  1/4/2023                                                                   IMPRESSION:      Right PICC terminates in the middle third of the SVC. Tunneled right jugular approach dialysis catheter terminates in the right atrium. Tracheostomy resides within the tracheal air column.     Persistent shallow lung inflation. Territories of bibasilar atelectasis are similar. Small right pleural effusion visible in the right lateral sulcus and layering into the interlobar fissures is similar. No enlarging pleural effusion. No pneumothorax.     Unchanged heart and mediastinal contours. Unchanged oblique interface just lateral to the dialysis catheter/SVC and corresponds to rounded atelectasis on prior CT.

## 2023-01-16 NOTE — PLAN OF CARE
Problem: Artificial Airway  Goal: Optimal Device Function  Intervention: Optimize Device Care and Function  Recent Flowsheet Documentation  Taken 1/15/2023 1952 by Ene Morrison, RT  Airway Safety Measures: all equipment/monitors on and audible   Goal Outcome Evaluation:    RT PROGRESS NOTE     DATA:     CURRENT SETTINGS:             TRACH TYPE / SIZE:  7 Bivona             MODE:   SP             FIO2:   20lt/30%     ACTION:             THERAPIES:   Mucomyst/Albuterol             SUCTION:                           FREQUENCY:   6x                        AMOUNT:   Large/copious                         CONSISTENCY:   Thick                        COLOR:   white             SPONTANEOUS COUGH EFFORT/STRENGTH OF EFFORT (not elicited by suctioning): weak                              WEANING PHASE:   2                        WEAN MODE:    SP                        WEAN TIME:   11 hours                        END WEAN REASON:   Rest     RESPONSE:             BS:   Coarse             VITAL SIGNS:   Vital signs:  Temp: 98.1  F (36.7  C) Temp src: Oral BP: 131/79 Pulse: 95   Resp: 18 SpO2: 95 % O2 Device: Trach dome Oxygen Delivery: 20 LPM   Weight: 84.4 kg (186 lb)  Estimated body mass index is 25.23 kg/m  as calculated from the following:    Height as of 11/12/22: 1.829 m (6').    Weight as of this encounter: 84.4 kg (186 lb).                   EMOTIONAL NEEDS / CONCERNS:  Communication                RISK FOR SELF DECANNULATION:  yes                        RISK DUE TO:  anxiety                        INTERVENTION/S IN PLACE IS/ARE:  Monitor       NOTE / PLAN:   Patient received his breathing TX's with no issues. Coarse breath sounds with large amount of secretions from Trach. PMV removed and balloon inflated for rest. Was called to the room several times at the beginning of shift to suction. Breath sounds improved temporarily after suctioning. Will continue to follow for need of care.

## 2023-01-16 NOTE — PROGRESS NOTES
RENAL PROGRESS NOTE    CC:  LORETTA likely progressed to ESRD    57 YOM EtOH cirrhosis , SP liver tx 2016, on IHD for LORETTA likely progressed to ESRD since 11/29/2022, recent prolonged hosp for PEA arrest 11/15/2022 to 12/15/2022 , cb influenza and bact PNA, sp Trach and PEG and had a second PEA arrest 12/20 with seizures , cb pleural effusions (fungal infection), and recurrent ascites , CSF + for HHV 6, now at [facility] , nephrology following for ESRD management.    ASSESSMENT & PLAN:     Anuric LORETTA likely ESRD: now requiring dialysis after PEA arrest, was on CRRT which was discontinued 11/26/2022 and started on intermittent HD ongoing since 11/29/2022.  He is maintained on a MWF schedule.  No signs of renal recovery.  Attempted diuretic challenge and bladder scans->remains anuric and torsemide was discontinued 1/2. Aim to keep net negative.  Target UF 2-3kg as BP allows. Midodrine to support blood pressure and UF. MWF HD at [facility]     Anemia of chronic renal failure.   Hematochezia   Left hemothorax.    On NORA 40K weekly, continue. Previously low iron, holding off replacement given infectious concerns as below. Transfusing per hospitalist service      Chronic hypotension: Likely 2/2 chronic liver disease, improving.  on midodrine to 5mg TID with parameters to hold for SBP>110.  may need to increase if hemodynamics do not support UF. Will hold off albumin for now     Hypokalemia: Needing intermittent replacement.  He is higher K bath with HD.     Acute on chronic hypoxic respiratory failure: S/p tracheostomy 11/29/2022.  Complicated by left hemopneumothorax s/p left chest tube.  History of aspergillus PNA and multiple bacterial PNA. On micafungin.  Mgmt per pulm      CARRILLO cirrhosis s/p liver transplant: Immunosuppression per GI.  Tacrolimus and prednisone.  Paracentesis as indicated per GI, planned for 1/13/2023. On Abx for lactobacillus peritonitis.      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   Initial seizure after PEA arrest, then another seizure again 12/21/2022 during dialysis session.  Initially on acyclovir and then switched over to ganciclovir, since been discontinued.  On Keppra and Vimpat.  Management per neurology and ID       SUBJECTIVE:    Pt new to me today  Saw at bedside on HD  Per HD RN, lines reversed  And working well last 2 treatments, line not sluggish with good inflow/outflow with lavage.   Discussed MWF HD schedule, hg low continue NORA therapy.   Labs reviewed, mild hyponatremia, monitor with HD  Pt denies pain  Sleep and appetite stable  Answered all questions.       OBJECTIVE:  Physical Exam   Temp: 97.1  F (36.2  C) Temp src: Axillary BP: 108/71 Pulse: 97   Resp: 20 SpO2: 100 % O2 Device: Trach dome Oxygen Delivery: 20 LPM  Vitals:    01/14/23 0700 01/15/23 0143 01/16/23 0325   Weight: 86.6 kg (191 lb) 84.4 kg (186 lb) 83.6 kg (184 lb 4.8 oz)     Vital Signs with Ranges  Temp:  [97.1  F (36.2  C)-98.6  F (37  C)] 97.1  F (36.2  C)  Pulse:  [] 97  Resp:  [18-20] 20  BP: (108-131)/(64-79) 108/71  Cuff Mean (mmHg):  [84-98] 84  FiO2 (%):  [30 %] 30 %  SpO2:  [93 %-100 %] 100 %  I/O last 3 completed shifts:  In: 700 [P.O.:320; I.V.:60; NG/GT:320]  Out: 700 [Stool:700]    @TMAXR(24)@    Patient Vitals for the past 72 hrs:   Weight   01/16/23 0325 83.6 kg (184 lb 4.8 oz)   01/15/23 0143 84.4 kg (186 lb)   01/14/23 0700 86.6 kg (191 lb)       Intake/Output Summary (Last 24 hours) at 1/16/2023 0918  Last data filed at 1/16/2023 0648  Gross per 24 hour   Intake 640 ml   Output 700 ml   Net -60 ml       PHYSICAL EXAM:  GEN: NAD  CV: RRR   Lung: clear and equal  Ab: soft and NT; distended; normal bs  Ext: no edema and well perfused  Skin: no rash  Access: C/d/I      LABORATORY STUDIES:     Recent Labs   Lab 01/16/23  0635 01/14/23  0615 01/13/23  1753 01/13/23  1750 01/12/23  2245 01/12/23  0621 01/11/23  0443 01/10/23  0608   WBC  --  6.2  --  7.0  --  4.7  --  4.8   RBC  --  2.56*   --  2.94*  --  2.17*  --  2.37*   HGB 7.9* 7.7* 9.0* 8.6* 7.8* 6.6*   < > 7.2*   HCT  --  25.3*  --  29.1*  --  22.3*  --  24.2*   PLT  --  214  --  253  --  167  --  131*    < > = values in this interval not displayed.       Basic Metabolic Panel:  Recent Labs   Lab 01/16/23  0635 01/16/23  0624 01/15/23  1139 01/15/23  0844 01/15/23  0809 01/14/23  0759 01/14/23  0615 01/14/23  0550 01/13/23  1753 01/13/23  0548 01/12/23  2245 01/12/23  0625 01/12/23  0621 01/12/23  0442 01/11/23  0443 01/10/23  0608   *  --   --   --   --   --  134*  --  136  --  137  --  136 137   < > 135*   POTASSIUM 4.0  --   --   --   --   --  4.1  --  3.7  --  3.5  --  3.5 3.4*   < > 3.9   CHLORIDE 94*  --   --   --   --   --  97*  --   --   --   --   --  99  --   --  96*   CO2 27  --   --   --   --   --  28  --   --   --   --   --  27  --   --  28   BUN 54.7*  --   --   --   --   --  34.1*  --   --   --   --   --  45.6*  --   --  50.5*   CR 3.18*  --   --   --   --   --  1.94*  --   --   --   --   --  2.08*  --   --  2.28*   GLC 96 110* 95 116* 65* 82 78   < > 112   < > 118   < > 152* 152*   < > 131*   KELLY 9.4  --   --   --   --   --  8.9  --   --   --   --   --  8.9  --   --  8.9    < > = values in this interval not displayed.       INRNo lab results found in last 7 days.     Recent Labs   Lab Test 01/16/23  0635 01/14/23  0615 01/13/23  1753 01/13/23  1750 12/22/22  1108 12/21/22  1315 12/17/22  0519 12/16/22  2239   INR  --   --   --   --   --  1.36*  --  1.14   WBC  --  6.2  --  7.0   < > 3.4*   < > 3.9*   HGB 7.9* 7.7*   < > 8.6*   < > 8.0*   < > 7.9*  7.9*   PLT  --  214  --  253   < > 75*   < > 94*    < > = values in this interval not displayed.       Personally reviewed current labs    Jie BABB-BC  [facility]  [phone]

## 2023-01-16 NOTE — PROGRESS NOTES
CLINICAL NUTRITION SERVICES - REASSESSMENT NOTE     Nutrition Prescription    RECOMMENDATIONS FOR MDs/PROVIDERS TO ORDER:  None at this time    Malnutrition Status:    Previously noted Severe Malnutrition 12/30    Recommendations already ordered by Registered Dietitian (RD):  Ordered Gelatein pineapple once/d as morning snack   Ordered Ensure strawberry once/d as afternoon snack  Encourage PO intake and use of bolus feeding when PO intake is minimal    Future/Additional Recommendations:  Monitor PO intake and enteral feeding volume  Consider calorie counts if unsure of PO intake  Change Ensure to Nepro if appetite increases     EVALUATION OF THE PROGRESS TOWARD GOALS   Diet: Soft and Bite Sized, moderately thickened liquid as of 1/13                    PO Intake: Per flowsheets, pt consuming 25-50% of ordered meals. Nursing notes indicate pt eating small bites and eating slowly. Visited with pt and family today. They state all he's had to eat today was egg salad and half of a thickened apple juice as lunch due to not wanting to eat during dialysis over breakfast. Family and present nurse also said that the nursing staff has told them that he has been refusing the bolus feeding when staff is recommending them after viewing % eaten from meal tray. Pt denies refusing, but rather the nursing staff has not offered. Discussed with pt the importance of this assistance in meeting nutrition needs and that it is not a bad thing to use when needed. Suggested protein supplements and pt was on board with this.    Nutrition Support:   -Dosing weight: 93.2 kg (admit wt)  -EN access via GT  -Regimen: One back up bolus enteral feeding of Novasource renal 250 ml ( 125 ml over 2 h) w/ water flush 100 ml b/4 and after feeding when intake < 50 % of meal will provide: 250 ml formula, 500 kcal, 23 g protein, 46 g carbohydrate, 0 g fiber, 179 ml free fluid from formula + 200ml  flush =379 ml  -FWF 30mL q 4 hours (180 mL/day)     1/13:  1,333 mL formula  1/14: 250 mL formula  1/15: 0 mL formula    NEW FINDINGS   General:    Frequent suctioning    Pt dialyzing    Resuscitated 1/12 with 1 L normal saline bolus    Nutrition/GI:    Rectal tube with output varying between 100-1300mL/d in the past week, per I/O. Average of 760mL/d, and seems to be decreasing.    Nursing notes indicate passing large amounts frequently    Weights:    Wt during this admit has fluctuated and is dialyzing, difficult to assess actual wt loss; ~10% wt loss since admit. Suspect some of this is actual wt loss    Pt states a UBW for him is 210 lbs.   01/16/23 0325 83.6 kg (184 lb 4.8 oz) Bed scale   01/15/23 0143 84.4 kg (186 lb) Bed scale   01/14/23 0700 86.6 kg (191 lb) Bed scale   01/13/23 0412 91.7 kg (202 lb 1.6 oz) --   01/12/23 0400 89.3 kg (196 lb 14.4 oz) --   01/11/23 0608 91.4 kg (201 lb 8 oz) --   01/10/23 0646 86.6 kg (191 lb) Bed scale   01/09/23 1910 91.6 kg (201 lb 14.4 oz) Bed scale   01/09/23 0301 94.8 kg (209 lb) Bed scale   01/08/23 0426 92.4 kg (203 lb 12.8 oz) Bed scale   01/07/23 0337 92.7 kg (204 lb 6.4 oz) Bed scale   01/06/23 0558 94.3 kg (208 lb) Bed scale   01/04/23 1625 94.7 kg (208 lb 12.8 oz) Bed scale   01/04/23 0649 93.8 kg (206 lb 12.8 oz) Bed scale   01/03/23 0420 94.8 kg (209 lb) Bed scale   01/02/23 0521 95 kg (209 lb 8 oz) --   01/01/23 0556 95 kg (209 lb 6.4 oz) Bed scale   12/31/22 0450 93.3 kg (205 lb 11.2 oz) Bed scale   12/30/22 1716 92.1 kg (203 lb 0.7 oz) --   12/30/22 0259 93.2 kg (205 lb 8 oz)      Labs:    Na: 133 (L)    BUN: 54.7 (H)    Cr: 3.18 (H)    Medications:    Folvite    Novolog    Lantus    Renal Multivitamin    Protonix    Zofran PRN    Colace PRN    Previous Goals   N/a ( BG )  Evaluation: Met    Previous Nutrition Diagnosis  n/a   Evaluation: Declining    CURRENT NUTRITION DIAGNOSIS  Inadequate oral intake related to decreased appetite as evidenced by >2% wt loss in the past week, severe muscle wasting, and need  for back up bolus feedings    INTERVENTIONS  Implementation  Enteral Nutrition - Continue   Medical food supplement therapy - Order Ensure and Gelatein  Nutrition education for recommended modifications    Goals  Patient to consume % of nutritionally adequate meal trays TID, or the equivalent with supplements/snacks.  Maintain weight    Monitoring/Evaluation  Progress toward goals will be monitored and evaluated per protocol.    Rocio Mccallum  Dietetic Intern

## 2023-01-17 ENCOUNTER — APPOINTMENT (OUTPATIENT)
Dept: OCCUPATIONAL THERAPY | Facility: CLINIC | Age: 58
DRG: 207 | End: 2023-01-17
Attending: HOSPITALIST
Payer: COMMERCIAL

## 2023-01-17 ENCOUNTER — APPOINTMENT (OUTPATIENT)
Dept: SPEECH THERAPY | Facility: CLINIC | Age: 58
DRG: 207 | End: 2023-01-17
Attending: HOSPITALIST
Payer: COMMERCIAL

## 2023-01-17 ENCOUNTER — APPOINTMENT (OUTPATIENT)
Dept: PHYSICAL THERAPY | Facility: CLINIC | Age: 58
DRG: 207 | End: 2023-01-17
Attending: HOSPITALIST
Payer: COMMERCIAL

## 2023-01-17 LAB
BASE EXCESS BLDV CALC-SCNC: 5 MMOL/L (ref -8.1–1.9)
CA-I BLD-MCNC: 1.23 MMOL/L (ref 1.11–1.3)
GLUCOSE BLD-MCNC: 104 MG/DL (ref 70–125)
GLUCOSE BLDC GLUCOMTR-MCNC: 101 MG/DL (ref 70–99)
GLUCOSE BLDC GLUCOMTR-MCNC: 92 MG/DL (ref 70–99)
HCO3 BLDV-SCNC: 28 MMOL/L (ref 24–30)
HGB BLD-MCNC: 8.1 G/DL (ref 13.3–17.7)
LACTATE BLD-SCNC: 1.3 MMOL/L (ref 0.7–2)
PATH REPORT.COMMENTS IMP SPEC: NORMAL
PATH REPORT.COMMENTS IMP SPEC: NORMAL
PATH REPORT.FINAL DX SPEC: NORMAL
PATH REPORT.GROSS SPEC: NORMAL
PATH REPORT.MICROSCOPIC SPEC OTHER STN: NORMAL
PATH REPORT.RELEVANT HX SPEC: NORMAL
PCO2 BLDV: 50 MM HG (ref 35–50)
PH BLDV: 7.39 [PH] (ref 7.35–7.45)
PHOSPHATE SERPL-MCNC: 3.7 MG/DL (ref 2.5–4.5)
PO2 BLDV: 34 MM HG (ref 25–47)
POTASSIUM BLD-SCNC: 3.6 MMOL/L (ref 3.5–5)
SATV LHE POCT: 64.1 % (ref 70–75)
SODIUM BLD-SCNC: 136 MMOL/L (ref 136–145)

## 2023-01-17 PROCEDURE — 94640 AIRWAY INHALATION TREATMENT: CPT

## 2023-01-17 PROCEDURE — 999N000123 HC STATISTIC OXYGEN O2DAILY TECH TIME

## 2023-01-17 PROCEDURE — 250N000009 HC RX 250: Performed by: NURSE PRACTITIONER

## 2023-01-17 PROCEDURE — 99233 SBSQ HOSP IP/OBS HIGH 50: CPT | Performed by: FAMILY MEDICINE

## 2023-01-17 PROCEDURE — G0463 HOSPITAL OUTPT CLINIC VISIT: HCPCS

## 2023-01-17 PROCEDURE — 250N000012 HC RX MED GY IP 250 OP 636 PS 637: Performed by: HOSPITALIST

## 2023-01-17 PROCEDURE — 88305 TISSUE EXAM BY PATHOLOGIST: CPT | Mod: 26 | Performed by: PATHOLOGY

## 2023-01-17 PROCEDURE — 120N000017 HC R&B RESPIRATORY CARE

## 2023-01-17 PROCEDURE — 82330 ASSAY OF CALCIUM: CPT

## 2023-01-17 PROCEDURE — 97535 SELF CARE MNGMENT TRAINING: CPT | Mod: GO | Performed by: OCCUPATIONAL THERAPIST

## 2023-01-17 PROCEDURE — 999N000157 HC STATISTIC RCP TIME EA 10 MIN

## 2023-01-17 PROCEDURE — 999N000009 HC STATISTIC AIRWAY CARE

## 2023-01-17 PROCEDURE — 250N000013 HC RX MED GY IP 250 OP 250 PS 637: Performed by: HOSPITALIST

## 2023-01-17 PROCEDURE — 250N000009 HC RX 250: Performed by: HOSPITALIST

## 2023-01-17 PROCEDURE — 97110 THERAPEUTIC EXERCISES: CPT | Mod: GP | Performed by: PHYSICAL THERAPIST

## 2023-01-17 PROCEDURE — 94640 AIRWAY INHALATION TREATMENT: CPT | Mod: 76

## 2023-01-17 PROCEDURE — 99223 1ST HOSP IP/OBS HIGH 75: CPT | Performed by: INTERNAL MEDICINE

## 2023-01-17 PROCEDURE — 97530 THERAPEUTIC ACTIVITIES: CPT | Mod: GP | Performed by: PHYSICAL THERAPIST

## 2023-01-17 PROCEDURE — 99232 SBSQ HOSP IP/OBS MODERATE 35: CPT | Performed by: NURSE PRACTITIONER

## 2023-01-17 PROCEDURE — 88112 CYTOPATH CELL ENHANCE TECH: CPT | Mod: 26 | Performed by: PATHOLOGY

## 2023-01-17 PROCEDURE — 999N000253 HC STATISTIC WEANING TRIALS

## 2023-01-17 PROCEDURE — 92507 TX SP LANG VOICE COMM INDIV: CPT | Mod: GN | Performed by: SPEECH-LANGUAGE PATHOLOGIST

## 2023-01-17 PROCEDURE — 84100 ASSAY OF PHOSPHORUS: CPT | Performed by: HOSPITALIST

## 2023-01-17 PROCEDURE — 92526 ORAL FUNCTION THERAPY: CPT | Mod: GN | Performed by: SPEECH-LANGUAGE PATHOLOGIST

## 2023-01-17 PROCEDURE — 250N000013 HC RX MED GY IP 250 OP 250 PS 637: Performed by: NURSE PRACTITIONER

## 2023-01-17 RX ORDER — ONDANSETRON 2 MG/ML
4 INJECTION INTRAMUSCULAR; INTRAVENOUS EVERY 6 HOURS PRN
Status: DISCONTINUED | OUTPATIENT
Start: 2023-01-17 | End: 2023-01-21 | Stop reason: HOSPADM

## 2023-01-17 RX ORDER — FLUCONAZOLE 200 MG/1
200 TABLET ORAL AT BEDTIME
Status: DISCONTINUED | OUTPATIENT
Start: 2023-01-17 | End: 2023-01-21 | Stop reason: HOSPADM

## 2023-01-17 RX ADMIN — Medication 1 DROP: at 21:44

## 2023-01-17 RX ADMIN — APIXABAN 5 MG: 2.5 TABLET, FILM COATED ORAL at 21:44

## 2023-01-17 RX ADMIN — ALBUTEROL SULFATE 2.5 MG: 2.5 SOLUTION RESPIRATORY (INHALATION) at 20:00

## 2023-01-17 RX ADMIN — ACETAMINOPHEN 975 MG: 325 TABLET, FILM COATED ORAL at 13:02

## 2023-01-17 RX ADMIN — Medication 40 MG: at 21:53

## 2023-01-17 RX ADMIN — Medication 6 MG: at 21:53

## 2023-01-17 RX ADMIN — ACETYLCYSTEINE 2 ML: 200 SOLUTION ORAL; RESPIRATORY (INHALATION) at 16:50

## 2023-01-17 RX ADMIN — Medication 1 DROP: at 09:03

## 2023-01-17 RX ADMIN — ACETAMINOPHEN 975 MG: 325 TABLET, FILM COATED ORAL at 21:44

## 2023-01-17 RX ADMIN — LACTULOSE 20 G: 20 SOLUTION ORAL at 09:02

## 2023-01-17 RX ADMIN — NYSTATIN 500000 UNITS: 100000 SUSPENSION ORAL at 17:39

## 2023-01-17 RX ADMIN — ALBUTEROL SULFATE 2.5 MG: 2.5 SOLUTION RESPIRATORY (INHALATION) at 08:25

## 2023-01-17 RX ADMIN — TACROLIMUS 1 MG: 5 CAPSULE ORAL at 09:02

## 2023-01-17 RX ADMIN — NYSTATIN 500000 UNITS: 100000 SUSPENSION ORAL at 09:02

## 2023-01-17 RX ADMIN — FLUCONAZOLE 200 MG: 200 TABLET ORAL at 22:01

## 2023-01-17 RX ADMIN — RIFAXIMIN 550 MG: 550 TABLET ORAL at 09:26

## 2023-01-17 RX ADMIN — LEVETIRACETAM 1000 MG: 100 SOLUTION ORAL at 21:44

## 2023-01-17 RX ADMIN — LACOSAMIDE 100 MG: 10 SOLUTION ORAL at 21:53

## 2023-01-17 RX ADMIN — ACETYLCYSTEINE 2 ML: 200 SOLUTION ORAL; RESPIRATORY (INHALATION) at 20:00

## 2023-01-17 RX ADMIN — ALBUTEROL SULFATE 2.5 MG: 2.5 SOLUTION RESPIRATORY (INHALATION) at 13:37

## 2023-01-17 RX ADMIN — TACROLIMUS 1 MG: 5 CAPSULE ORAL at 17:38

## 2023-01-17 RX ADMIN — Medication 1 TABLET: at 09:02

## 2023-01-17 RX ADMIN — ACETYLCYSTEINE 2 ML: 200 SOLUTION ORAL; RESPIRATORY (INHALATION) at 13:37

## 2023-01-17 RX ADMIN — Medication 40 MG: at 09:02

## 2023-01-17 RX ADMIN — LACOSAMIDE 100 MG: 10 SOLUTION ORAL at 09:01

## 2023-01-17 RX ADMIN — CHLORHEXIDINE GLUCONATE 0.12% ORAL RINSE 15 ML: 1.2 LIQUID ORAL at 09:02

## 2023-01-17 RX ADMIN — MIDODRINE HYDROCHLORIDE 5 MG: 5 TABLET ORAL at 17:39

## 2023-01-17 RX ADMIN — QUETIAPINE 75 MG: 25 TABLET ORAL at 21:44

## 2023-01-17 RX ADMIN — MIDODRINE HYDROCHLORIDE 5 MG: 5 TABLET ORAL at 09:02

## 2023-01-17 RX ADMIN — Medication 1 DROP: at 13:01

## 2023-01-17 RX ADMIN — CHLORHEXIDINE GLUCONATE 0.12% ORAL RINSE 15 ML: 1.2 LIQUID ORAL at 21:44

## 2023-01-17 RX ADMIN — NYSTATIN 500000 UNITS: 100000 SUSPENSION ORAL at 13:01

## 2023-01-17 RX ADMIN — FOLIC ACID 1 MG: 1 TABLET ORAL at 09:03

## 2023-01-17 RX ADMIN — RIFAXIMIN 550 MG: 550 TABLET ORAL at 21:44

## 2023-01-17 RX ADMIN — ALBUTEROL SULFATE 2.5 MG: 2.5 SOLUTION RESPIRATORY (INHALATION) at 16:49

## 2023-01-17 RX ADMIN — MIDODRINE HYDROCHLORIDE 5 MG: 5 TABLET ORAL at 13:01

## 2023-01-17 RX ADMIN — WHITE PETROLATUM: 1.75 OINTMENT TOPICAL at 09:06

## 2023-01-17 RX ADMIN — ACETAMINOPHEN 975 MG: 325 TABLET, FILM COATED ORAL at 05:57

## 2023-01-17 RX ADMIN — ACETYLCYSTEINE 2 ML: 200 SOLUTION ORAL; RESPIRATORY (INHALATION) at 08:25

## 2023-01-17 RX ADMIN — NYSTATIN 500000 UNITS: 100000 SUSPENSION ORAL at 21:44

## 2023-01-17 ASSESSMENT — ACTIVITIES OF DAILY LIVING (ADL)
ADLS_ACUITY_SCORE: 65
ADLS_ACUITY_SCORE: 65
ADLS_ACUITY_SCORE: 67
ADLS_ACUITY_SCORE: 65
ADLS_ACUITY_SCORE: 67
ADLS_ACUITY_SCORE: 63
ADLS_ACUITY_SCORE: 67
ADLS_ACUITY_SCORE: 65
ADLS_ACUITY_SCORE: 65

## 2023-01-17 NOTE — PLAN OF CARE
Problem: Mechanical Ventilation Invasive  Goal: Optimal Device Function  Intervention: Optimize Device Care and Function  Recent Flowsheet Documentation  Taken 1/17/2023 0048 by Lyudmila Gavlin RT  Airway Safety Measures: all equipment/monitors on and audible  Taken 1/16/2023 2130 by Lyudmila Galvin RT  Airway Safety Measures: all equipment/monitors on and audible  Taken 1/16/2023 1956 by Lyudmila Galvin RT  Airway Safety Measures: all equipment/monitors on and audible     Problem: Artificial Airway  Goal: Optimal Device Function  Intervention: Optimize Device Care and Function  Recent Flowsheet Documentation  Taken 1/17/2023 0048 by Lyudmila Galvin RT  Airway Safety Measures: all equipment/monitors on and audible  Taken 1/16/2023 2130 by Lyudmila Galvin RT  Airway Safety Measures: all equipment/monitors on and audible  Taken 1/16/2023 1956 by Lyudmila Galvin RT  Airway Safety Measures: all equipment/monitors on and audible     RT PROGRESS NOTE     DATA:     CURRENT SETTINGS:              TRACH TYPE / SIZE: #7 [place], placed on 01/9/22             MODE: TM/PMV             FIO2: 35%/30 L     ACTION:             THERAPIES: Albuterol/Mucomyst QID              SUCTION:                           FREQUENCY: X4                        AMOUNT: small to moderate                         CONSISTENCY: thick                        COLOR: white              SPONTANEOUS COUGH EFFORT/STRENGTH OF EFFORT (not elicited by suctioning): good                           WEANING PHASE: 2/3                        WEAN MODE: cap day when up in the chair, otherwise TM/PMV day with TM/CUFF UP at night. Goal today for total time on PMV 14 HRS.                        WEAN TIME: since 16:00-06:30 (14 hours and 30 min)                        END WEAN REASON: end of wean trial.      RESPONSE:             BS: coarse              VITAL SIGNS: /62 (BP Location: Left arm)   Pulse 110   Temp 97.6  F (36.4  C) (Oral)   Resp 24   Wt 83.6  kg (184 lb 4.8 oz)   SpO2 96%   BMI 25.00 kg/m                EMOTIONAL NEEDS / CONCERNS:                  RISK FOR SELF DECANNULATION:                          RISK DUE TO:                          INTERVENTION/S IN PLACE IS/ARE:         NOTE / PLAN: Patient remains on TM/PMV. FIO2 increase to 35% to maintain SpO2 in 90'S. Otherwise tolerated well. Neb txs given as order. TM/CUFF UP at 06:30. Will cont to monitor.    no concerns

## 2023-01-17 NOTE — PLAN OF CARE
Problem: Plan of Care - These are the overarching goals to be used throughout the patient stay.    Goal: Absence of Hospital-Acquired Illness or Injury  Outcome: Progressing     Problem: Pain Acute  Goal: Optimal Pain Control and Function  Outcome: Progressing   Goal Outcome Evaluation:

## 2023-01-17 NOTE — CONSULTS
INFECTIOUS DISEASE CONSULTATION     Patient:  Blanco Osborne   Date of birth 1965, Medical record number [id number]  Date of Visit:  01/17/2023  Date of Admission: 12/29/2022-[facility]  Consult Requester:Yahir Barcenas MD          Assessment and Recommendations:   ASSESSMENT:    1. Liver transplantation 2016, on tacrolimus  2. Prolonged hospitalization [id number] for PEA cardiac arrest, pneumococcal/parainfluenza pneumonia with ARDS, septic shock with multiorgan failure  3. Hemodialysis  4. Status post trach/PEG  5. Right pneumothorax requiring previous chest tube.  6. Discharged to LTAC with chest tube, and hospitalized at [facility] on 12/16/2022 with PEA arrest and seizures, GI bleed, left pneumothorax  7. 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 Spontaneous bacterial peritonitis ascites cultures had previously shown lactobacillus  9. 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 Seizure onset with evidence of CNS embolic infarct unclear source.  11. Thoracentesis on 1/13/2023: 7918 nucleated cells,, 93% neutrophils, 60,000 RBC, right pleural cavity.    DISCUSSION:     Patient had recent prolonged hospitalizations for pneumococcal sepsis, pneumonia, ARDS, septic shock, multiorgan system failure, currently hemodialysis dependent.  Had episodes of PEA cardiac arrest in the past.  Rule effusion, status post thoracentesis  Ascites status post paracentesis  Cultures in process from pleural fluid and negative to date      1.  According to the ID physician at [facility], HSV DNA quant and serum was elevated, no encephalitis on CSF, and per transplant team at [facility], patient was given ganciclovir.  Completed 12/26/2022.  2. Patient peritonitis secondary to lactobacillus was treated with Unasyn, and transition to oral Augmentin for 4 weeks.  Augmentin completed 1/11/2023.  3. 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      RECOMMENDATION:    1. Continue fluconazole at least until 1/23/2023, 4-week course  2. Follow pleural fluid cultures, blood culture results  3. Updated patient's and patient's wife at bedside  4. Updated patient's nurse.    5. CT chest abdomen and pelvis to follow-up pleural effusions.  Last CT was done on 1/6/2023.  Thank you for this consult.  ID will follow peripherally.  Please call with questions    Darling Emmanuel MD  [facility]  Answering Service: [phone]  On-Call ID provider: [phone], option: 9      ________________________________________________________________    Consult Question:.complicated parapneumonic effusion in immunosupressed patient    Admission Diagnosis: Respiratory failure, acute-on-chronic (H) [J96.20]         History of Present Illness:     57-year-old male with history of acute on chronic respiratory failure with hypoxia, cirrhosis, liver transplant 2016, had nonalcoholic fatty liver disease, immune suppression, admitted to [facility] following parainfluenza and strep pneumo pneumonia.  Patient had hydropneumothorax which grew Candida.  Patient had chest tube placed, failed extubation twice, had tracheostomy.  Cardiac arrest, seizure, and debilitated.  Previous ID notes reviewed from [facility]    ID consulted during this hospitalization after pleural effusion thoracentesis.  Patient stated that he is breathing better after thoracentesis.  Trach is capped.  No other new complaints.  Extensive chart review due to previous hospitalization             Review of Systems:   CONSTITUTIONAL:  No fevers or chills  EYES: negative for icterus  ENT:  negative for hearing loss, tinnitus and sore throat  RESPIRATORY: Shortness of breath, trach  CARDIOVASCULAR:  negative for chest pain, dyspnea  GASTROINTESTINAL:  negative for nausea, vomiting, diarrhea and constipation  GENITOURINARY:  negative for dysuria  HEME:  easy  bruising  INTEGUMENT:  negative for rash and pruritus  NEURO:  Negative for headache, very deconditioned         Past Medical History:     Past Medical History:   Diagnosis Date     Acquired immunocompromised state (H) 2022     Ascites      Aspergillus pneumonia (H) 2022     Cirrhosis of liver with ascites (H) 2016     H/O alcohol abuse      HTN (hypertension)      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     NAFLD (nonalcoholic fatty liver disease) 2016     CHRISTIAN on CPAP      Parainfluenza type 1 infection 2022     SBP (spontaneous bacterial peritonitis) (H)     [facility]     Sepsis due to Streptococcus pneumoniae with acute hypoxic respiratory failure (H) 2022     Tubular adenoma 10/2019    Large cecal adenoma- due for surveillance colonoscopy in 3 years (10/2022)            Past Surgical History:     Past Surgical History:   Procedure Laterality Date     APPENDECTOMY       BENCH LIVER N/A 2016    Procedure: BENCH LIVER;  Surgeon: Enoc Crews MD;  Location: [facility] OR     BRONCHOSCOPY FLEXIBLE AND RIGID N/A 2022    Procedure: BRONCHOSCOPY;  Surgeon: Alena Valenzuela MD;  Location:  GI     COLONOSCOPY  13    repeat in 2018     COLONOSCOPY N/A 10/4/2019    Procedure: COLONOSCOPY, WITH POLYPECTOMY AND BIOPSY;  Surgeon: Go Chong MD;  Location: [facility] OR     HERNIA REPAIR       IR CHEST TUBE PLACEMENT NON-TUNNELED RIGHT  2022     IR CVC TUNNEL PLACEMENT > 5 YRS OF AGE  2022     IR GASTROSTOMY TUBE PERCUTANEOUS PLCMNT  2022     IR PARACENTESIS  2022     IR PARACENTESIS  2022     IR THORACENTESIS  2022     IR TRANSCATHETER BIOPSY  2022     TRACHEOSTOMY N/A 2022    Procedure: TRACHEOSTOMY;  Surgeon: Nilesh Jackson MD;  Location:  OR     TRANSPLANT LIVER RECIPIENT  DONOR N/A 2016    Procedure: TRANSPLANT LIVER RECIPIENT  DONOR;  Surgeon: Enoc Crews MD;  [facility]: [facility]  OR            Family History:   Reviewed and non-contributory.   No sick contacts           Social History:     Social History     Tobacco Use     Smoking status: Never     Smokeless tobacco: Never   Substance Use Topics     Alcohol use: Not Currently     Alcohol/week: 0.0 standard drinks     History   Sexual Activity     Sexual activity: Not on file   Used to work in computer    In the last 3-month had exposure-previous home owner had several pets including rabbits other mammals and birds and basement smelled bad.  Did renovation in the home they moved to [place] in the last 3 months.         Current Medications:       sodium chloride 0.9%  300 mL Hemodialysis Machine During Dialysis/CRRT (from stock)     acetaminophen  975 mg Per Feeding Tube Q8H BIJU    Or     acetaminophen  650 mg Rectal Q8H BIJU     acetylcysteine  2 mL Nebulization 4x daily     albuterol  2.5 mg Nebulization 4x daily     apixaban ANTICOAGULANT  5 mg Oral or Feeding Tube BID     carboxymethylcellulose PF  1 drop Both Eyes TID     chlorhexidine  15 mL Mouth/Throat BID     epoetin mirta-epbx  40,000 Units Subcutaneous Weekly     fluconazole  200 mg Per Feeding Tube At Bedtime     folic acid  1 mg Oral or Feeding Tube Daily     heparin Lock (1000 units/mL High concentration)  1,900 Units Intracatheter During Dialysis/CRRT (from stock)     heparin Lock (1000 units/mL High concentration)  1,900 Units Intracatheter During Dialysis/CRRT (from stock)     insulin aspart  1-6 Units Subcutaneous Daily     insulin glargine  17 Units Subcutaneous Daily     lacosamide  100 mg Oral or Feeding Tube BID     lactulose  20 g Oral or Feeding Tube BID     levETIRAcetam  1,000 mg Oral or Feeding Tube Q24H     Melatonin  6 mg Oral or NG Tube At Bedtime     midodrine  10 mg Oral During Dialysis/CRRT (from stock)     midodrine  5 mg Oral or Feeding Tube TID w/meals     mineral oil-hydrophilic petrolatum   Topical Daily     multivitamin RENAL  1 tablet Per Feeding Tube Daily      nystatin  500,000 Units Swish & Swallow 4x Daily     pantoprazole  40 mg Per Feeding Tube BID     QUEtiapine  75 mg Oral or Feeding Tube At Bedtime     rifaximin  550 mg Per Feeding Tube BID     sodium chloride (PF)  10-30 mL Intracatheter Q8H     tacrolimus  1 mg Oral or Feeding Tube BID            Allergies:   No Known Allergies         Physical Exam:   Vitals were reviewed  Patient Vitals for the past 24 hrs:   BP Temp Temp src Pulse Resp SpO2   01/17/23 0900 112/56 98.5  F (36.9  C) Oral 100 20 --   01/17/23 0825 -- -- -- 101 22 100 %   01/17/23 0750 108/56 98.5  F (36.9  C) Oral 104 20 98 %   01/17/23 0419 -- -- -- 97 24 96 %   01/17/23 0048 -- -- -- 110 24 96 %   01/16/23 2344 103/62 97.6  F (36.4  C) Oral 110 24 97 %   01/16/23 2130 -- -- -- -- -- 96 %   01/16/23 1956 -- -- -- 106 20 96 %   01/16/23 1605 -- -- -- 114 20 97 %   01/16/23 1555 111/70 97.7  F (36.5  C) Axillary 111 -- 96 %   01/16/23 1440 -- -- -- 109 20 95 %   01/16/23 1332 -- -- -- 104 20 98 %       Physical Examination:  GENERAL: Peers chronically ill, frail  HEENT:  Head is normocephalic, atraumatic   EYES:  Eyes have anicteric sclerae without conjunctival injection or stigmata of endocarditis.    ENT:  Oropharynx is moist without exudates or ulcers. Tongue is midline  NECK: Trach, capped  LUNGS: Bilateral intermittent scattered rales  CARDIOVASCULAR: S1-S2  ABDOMEN: G-tube, tenderness, rectal tube  SKIN: Ecchymosis  NEUROLOGIC: Very deconditioned, able to answer questions, in chair at bedside and needs assistance to transfer.         Laboratory Data:     Cell Count Body Fluid  Order: [id number] - Part of Panel Order [id number]   Status: Final result      Visible to patient: Yes (not seen)      0 Result Notes    Component Ref Range & Units 4 d ago   (1/13/23) 4 d ago   (1/13/23) 3 wk ago   (12/22/22) 1 mo ago   (12/1/22) 1 mo ago   (11/26/22)    Color Colorless, Yellow Red Abnormal   Yellow  Yellow  Pink Abnormal   Red Abnormal      Clarity  Clear Cloudy Abnormal   Turbid Abnormal   Cloudy Abnormal   Cloudy Abnormal   Bloody Abnormal      Total Nucleated Cells /uL 7,918  180  94  325  1,944     RBC Count /uL 60,000  96        Cell Count Fluid Source  Pleural Cavity, Right  Abdomen CM  Abdomen  Abdomen  Lung, Left               Inflammatory Markers    Recent Labs   Lab Test 11/22/22  0025   CRP 22.3*       Hematology Studies    Recent Labs   Lab Test 01/17/23  0952 01/16/23  0635 01/14/23  0615 01/13/23  1753 01/13/23  1750 01/12/23  2245 01/12/23  0621 01/11/23  0443 01/10/23  0608 01/04/23  0623 12/27/22  0420 12/26/22  0436 11/22/22  0423 11/22/22  0025 11/16/22  0147 11/15/22  1822 11/15/22  1132 11/15/22  1007 11/14/22  0657 11/13/22  0828 11/12/22  1502 11/12/22  1147   WBC  --   --  6.2  --  7.0  --  4.7  --  4.8  --  7.5 4.8   < > 47.0*   < > 55.8* 60.0* 58.0*  58.0*   < > 16.3*   < > 17.2*   ANEU  --   --   --   --   --   --   --   --   --   --   --   --   --  39.0*  --  52.5* 54.6* 49.9*  --  14.7*  --  12.6*   AEOS  --   --   --   --   --   --   --   --   --   --   --   --   --  0.0  --  0.0 0.0 0.0  --  0.0  --  0.0   HGB 8.1* 7.9* 7.7* 9.0* 8.6* 7.8* 6.6*   < > 7.2*   < > 8.8* 8.1*   < > 10.5*   < > 11.5* 11.4* 11.7*  11.7*   < > 11.3*   < > 11.6*   MCV  --   --  99  --  99  --  103*  --  102*  --  107* 103*   < > 107*   < > 102* 103* 104*  104*   < > 105*   < > 113*   PLT  --   --  214  --  253  --  167  --  131*   < > 63* 55*   < > 229   < > 87* 95* 88*  88*   < > 115*   < > 237    < > = values in this interval not displayed.       Metabolic Studies     Recent Labs   Lab Test 01/17/23  0952 01/16/23  0635 01/14/23  0615 01/13/23  1753 01/12/23  2245 01/12/23  0621 01/11/23  0443 01/10/23  0608 01/09/23  1824 01/09/23  0656    133* 134* 136 137 136   < > 135*   < > 134*   POTASSIUM 3.6 4.0 4.1 3.7 3.5 3.5   < > 3.9   < > 3.7   CHLORIDE  --  94* 97*  --   --  99  --  96*  --  94*   CO2  --  27 28  --   --  27  --  28  --  27   BUN   --  54.7* 34.1*  --   --  45.6*  --  50.5*  --  76.0*   CR  --  3.18* 1.94*  --   --  2.08*  --  2.28*  --  3.14*   GFRESTIMATED  --  22* 39*  --   --  36*  --  32*  --  22*    < > = values in this interval not displayed.       Hepatic Studies    Recent Labs   Lab Test 01/14/23  0615 01/12/23  0621 01/10/23  0608 01/02/23  0527 01/01/23  0626 12/31/22  0610 12/24/22  0551 12/23/22  0401 12/22/22  1652 12/22/22  1108 12/21/22  0405   BILITOTAL 0.7 0.4 0.5  --   --   --   --  0.6  --  0.8 1.1   ALKPHOS 250* 368* 361*  --   --   --   --  198*  --  163* 133   ALBUMIN 1.9* 2.1* 2.0* 2.2* 2.4* 2.2*   < > 1.9*   < > 2.1* 2.0*  1.9*   AST 23 19 24  --   --   --   --  14  --  16 20   ALT 11 11 11  --   --   --   --  17  --  17 17    < > = values in this interval not displayed.       Microbiology:  Culture Micro   Date Value Ref Range Status   09/28/2016 No VRE isolated  Final   09/23/2016 No growth  Final   09/20/2016   Final    Culture negative for acid fast bacilli  Assayed at [facility] [address]     09/20/2016 No anaerobes isolated  Final   09/20/2016 No growth  Final   09/20/2016 Culture negative after 4 weeks  Final     7-Day Micro Results     Collected Updated Procedure Result Status      01/13/2023 1737 01/16/2023 2303 Blood Culture Hand, Left [[id number]]   Blood from Hand, Left    Preliminary result Component Value   Culture No growth after 3 days  [P]                01/13/2023 1737 01/16/2023 2303 Blood Culture Hand, Right [[id number]]   Blood from Hand, Right    Preliminary result Component Value   Culture No growth after 3 days  [P]                01/13/2023 1259 01/13/2023 2006 Cell count with differential fluid [[id number]]    (Abnormal)   Pleural fluid from Pleural Cavity, Right    Final result Component Value   No component results            01/13/2023 1259 01/17/2023 1405 Cytology, non-gynecologic [[id number]]   Pleural fluid from Pleural Cavity, Right    Final result  Component Value   Final Diagnosis This result contains rich text formatting which cannot be displayed here.   Comment This result contains rich text formatting which cannot be displayed here.   Clinical Information This result contains rich text formatting which cannot be displayed here.   Gross Description This result contains rich text formatting which cannot be displayed here.   Microscopic Description This result contains rich text formatting which cannot be displayed here.   Performing Labs This result contains rich text formatting which cannot be displayed here.            01/13/2023 1259 01/13/2023 1747 Glucose fluid [[id number]]    Pleural fluid from Pleural Cavity, Right    Final result Component Value Units   Glucose Fluid Source Pleural Cavity, Right    Glucose fluid 2 mg/dL            01/13/2023 1259 01/13/2023 2239 Lactate dehydrogenase fluid [[id number]]    Pleural fluid from Pleural Cavity, Right    Final result Component Value Units   LD Fluid Source Pleural Cavity, Right    Lactate dehydrogenase fluid >2,500 U/L            01/13/2023 1259 01/13/2023 1747 Protein fluid [[id number]]    Pleural fluid from Pleural Cavity, Right    Final result Component Value Units   Protein Fluid Source Pleural Cavity, Right    Protein Total Fluid 3.1 g/dL            01/13/2023 1259 01/13/2023 1331 Acid-Fast Bacilli Culture and Stain [[id number]]    Pleural fluid from Pleural Cavity, Right    In process Component Value   No component results            01/13/2023 1259 01/16/2023 1716 Anaerobic bacterial culture [[id number]]    Pleural fluid from Pleural Cavity, Right    Preliminary result Component Value   Culture No anaerobic organisms isolated after 3 days  [P]                01/13/2023 1259 01/13/2023 1530 pH fluid [[id number]]    Pleural fluid from Pleural Cavity, Right    Final result Component Value Units   pH Fluid Source Pleural Cavity, Right    pH Fluid 7.5 pH            01/13/2023 1259 01/16/2023  1716 Fungal or Yeast Culture Routine [[id number]]   Pleural fluid from Pleural Cavity, Right    Preliminary result Component Value   Culture No growth after 3 days  [P]                01/13/2023 1259 01/17/2023 0732 Pleural fluid Aerobic Bacterial Culture Routine with Gram Stain [[id number]]   Pleural fluid from Pleural Cavity, Right    Preliminary result Component Value   Culture No growth after 3 days  [P]    Gram Stain Result No organisms seen  [P]     4+ WBC seen  [P]     Predominantly PMNs               01/13/2023 1259 01/13/2023 1930 Cell count with differential fluid [[id number]]    (Abnormal)   Ascites Fluid from Paracentesis    Final result Component Value   No component results            01/13/2023 1259 01/13/2023 1742 Albumin fluid [[id number]]    Ascites Fluid from Paracentesis    Final result Component Value Units   Albumin Fluid Source Abdomen    ascites  ascites  ascites   Albumin fluid 0.5 g/dL            01/13/2023 1259 01/13/2023 1742 Protein fluid [[id number]]    Ascites Fluid from Paracentesis    Final result Component Value Units   Protein Fluid Source Abdomen    ascites  ascites  ascites  ascites   Protein Total Fluid 1.4 g/dL            01/13/2023 1259 01/13/2023 1821 Gram stain [[id number]]   Ascites Fluid from Abdomen    Final result Component Value   Gram Stain Result No organisms seen   Gram Stain Result 3+ WBC seen   Predominantly PMNs            01/13/2023 1259 01/16/2023 1731 Anaerobic bacterial culture [[id number]]    Ascites Fluid from Abdomen    Preliminary result Component Value   Culture No anaerobic organisms isolated after 3 days  [P]                01/13/2023 1259 01/13/2023 2006 Cell Count Body Fluid [[id number]]    (Abnormal)   Pleural fluid from Pleural Cavity, Right    Final result Component Value Units   Color Red    Clarity Cloudy    Total Nucleated Cells 7,918 /uL   RBC Count 60,000 /uL   Cell Count Fluid Source Pleural Cavity, Right              01/13/2023 1259 01/15/2023 1827 Acid-Fast Bacilli Culture and Stain [[id number]]    Pleural fluid from Pleural Cavity, Right    Preliminary result Component Value   Acid Fast Stain No acid fast bacilli seen  [P]    Less than 5 mL of specimen received. A minimum of 5 mL of sputum or fluid is recommended for recovery of acid fast bacilli (AFB). Volumes less than 5 mL are suboptimal and may compromise recovery of AFB from culture.   Acid Fast Stain No acid fast bacilli seen  [P]    Less than 5 mL of specimen received. A minimum of 5 mL of sputum or fluid is recommended for recovery of acid fast bacilli (AFB). Volumes less than 5 mL are suboptimal and may compromise recovery of AFB from culture.  This is an appended report. These results have been appended to a previously preliminary verified report.            01/13/2023 1259 01/13/2023 1930 Cell Count Body Fluid [[id number]]    (Abnormal)   Ascites Fluid from Paracentesis    Final result Component Value Units   Color Yellow    Clarity Turbid    Total Nucleated Cells 180 /uL   RBC Count 96 /uL   Cell Count Fluid Source Abdomen    ascites  ascites  ascites  ascites            01/13/2023 1259 01/13/2023 2006 Differential Body Fluid [[id number]]    Pleural fluid from Pleural Cavity, Right    Final result Component Value Units   % Neutrophils 93 %   % Lymphocytes 7 %   % Monocyte/Macrophages --             01/13/2023 1259 01/13/2023 1908 Differential Body Fluid [[id number]]    Ascites Fluid from Paracentesis    Final result Component Value Units   % Neutrophils 16 %   % Lymphocytes 42 %   % Monocyte/Macrophages 40 %   % Lining Cells 2 %   Absolute Neutrophils, Body Fluid 28.8 /uL            01/13/2023 1259 01/17/2023 0733 Ascites Fluid Aerobic Bacterial Culture Routine [[id number]]   Ascites Fluid from Abdomen    Preliminary result Component Value   Culture No growth after 3 days  [P]                      Urine Studies    Recent Labs   Lab Test 11/25/22  2662  10/31/17  1038 09/24/16  1055 09/23/16  1458 03/01/16  0644   LEUKEST Small* Negative Moderate* Large* Duplicate request  SEE [id number]  *  Negative   WBCU  --  1 13* 103* 2*       Vancomycin Levels    Recent Labs   Lab Test 11/16/22  0546 11/15/22  0503 11/14/22  1020   VANCOMYCIN 13.6 14.7 18.1       Hepatitis B Testing   Recent Labs   Lab Test 01/12/23  0621 12/16/22  2239 11/14/22  1141 09/20/16  1259 03/01/16  0648   HBCAB  --   --   --  Nonreactive Nonreactive   HEPBANG Nonreactive Nonreactive   < >  --  Nonreactive    < > = values in this interval not displayed.     Hepatitis C Testing     Hepatitis C Antibody   Date Value Ref Range Status   09/20/2016  NR Final    Nonreactive   Assay performance characteristics have not been established for newborns,   infants, and children     03/01/2016  NR Final    Nonreactive   Assay performance characteristics have not been established for newborns,   infants, and children       Respiratory Virus Testing    No results found for: LISSET VELASQUEZ      Radiology: Reviewed    CTA Chest Abdomen Pelvis w Contrast    Result Date: 1/7/2023  EXAM: CTA CHEST ABDOMEN PELVIS W CONTRAST LOCATION: [facility] DATE/TIME: 1/6/2023 11:56 PM INDICATION: Acute blood loss, worsening bloody tracheal secretions, abdomen more distended, rule out other source of potential bleed. COMPARISON: CT of the chest 12/20/2022 TECHNIQUE: CT angiogram chest abdomen pelvis during arterial phase of injection of IV contrast. 2D and 3D MIP reconstructions were performed by the CT technologist. Dose reduction techniques were used. CONTRAST: Isovue 370 100 mL FINDINGS: CT ANGIOGRAM CHEST, ABDOMEN, AND PELVIS: There are mild calcifications seen with no dissection, serge intimal tearing, aneurysm, high-grade stenosis, or intraluminal clot identified. There is good flow extending into both groins. LUNGS AND PLEURA: There is a mild to moderate-sized right pleural effusion and a mild left pleural  effusion with mild to moderate bilateral atelectasis in the lower lobes bilaterally with no central airway obstruction. There is a benign calcified granuloma seen in the right lung. The bilateral pleural effusions may be chronic in nature given there appears to be some thin enhancement involving the periphery of both pleural effusions which may represent a loculated component. There is a minute amount of gas seen associated with the left pleural effusion and this is presumed to be iatrogenic in nature. There is an area of linear increased density in the anterior medial aspect of the right lower lobe appears slightly smaller than prior, the 12/20/2022, and of decreased density and this likely represents sequelae of the prior suggested hematoma. MEDIASTINUM/AXILLAE: There is a tracheostomy which appears to be in good position. 4.8 x 6.4 mm low-density lesion in the left thyroid lobe with Hounsfield units most typical for a small cyst. Minimal filling defect lower aspect of the trachea to the right of midline which is presumed to represent sputum. Central line is seen with distal tip within the right atrium. The main pulmonary vein is distended at 4.0 cm and likely represents sequelae of pulmonary arterial hypertension. CORONARY ARTERY CALCIFICATION: Moderate. HEPATOBILIARY: The gallbladder is surgically absent. The bile ducts and liver are normal. PANCREAS: Normal. SPLEEN: Splenomegaly with the spleen measuring 18.1 cm greatest longitudinal dimension. Mild scattered varicosities. These findings can be associated with portal venous hypertension. ADRENAL GLANDS: Normal. KIDNEYS/BLADDER: Bilateral atrophy of the kidneys with no hydronephrosis. The bladder is deflated likely secondary to pressure from the ascites. BOWEL: There is an endorectal catheter seen. This appears to be in good position. Slight findings of diverticulosis with no evidence for diverticulitis. Percutaneous gastrostomy tube and this appears be in good  position. There is a large amount of abdominal and pelvic ascites. There is significant abnormal stranding of the mesenteric fat/omentum which may be inflammatory or neoplastic in nature. LYMPH NODES: Normal. PELVIC ORGANS: Normal. MUSCULOSKELETAL: Moderate multilevel degenerative changes are seen most marked in the thoracolumbar spine. Slight anterior wedging of the L1 vertebral body may represent an old-appearing compression fracture with no retropulsion. Old rib fractures.     IMPRESSION: 1. No obvious active bleeding identified. 2. Large amount of abdominal/pelvic ascites, splenomegaly, and scattered varicosities. The above findings would be most typical for sequelae of portal venous hypertension. 3. Significant stranding of the omental fat and intraperitoneal fat, this is nonspecific, but may be reactive or neoplastic in nature. 4. Percutaneous gastrostomy tube, tracheostomy, and the rectal tube appear to be in good position. 5. Central catheter with tip in the inferior right atrium. 6. Mild to moderate right pleural effusion and mild left pleural effusion, both of these appear to be loculated given the peripheral enhancement. Presumed sequelae of prior hematoma seen in the right lung. 7. Minute amount of gas seen associated with the left pleural effusion and this is presumed to be iatrogenic in nature. 8. Slight filling defect within the lower trachea most typical for slight mucous. 9. Enlargement of the main pulmonary artery presumed to represent sequelae of pulmonary arterial hypertension.     US Paracentesis without Albumin    Result Date: 1/13/2023  EXAM: 1. PARACENTESIS 2. ULTRASOUND GUIDANCE LOCATION: [facility] DATE/TIME: 1/13/2023 1:09 PM INDICATION: Ascites. PROCEDURE: Informed consent obtained. Time out performed. The abdomen was prepped and draped in a sterile fashion. 10 mL of 1% lidocaine was infused into local soft tissues. A 5 Niuean catheter system was introduced into  the abdominal ascites under ultrasound guidance. 7.5  liters of milky white  fluid were removed and sent to lab if requested. Patient tolerated procedure well. Ultrasound imaging was obtained and placed in the patient's permanent medical record.     IMPRESSION: 1.  Status post ultrasound-guided paracentesis. Reference CPT Code: 35290    US Paracentesis Portable    Result Date: 12/22/2022  US PARACENTESIS PORTABLE 12/22/2022 1:43 PM CLINICAL HISTORY: Abdominal pain PROCEDURE: Informed consent obtained from the patient's wife over the telephone by Dr. Tatum yesterday. Time out performed. The abdomen was prepped and draped in a sterile fashion. 10 mL of 1% lidocaine was infused into local soft tissues. An 8 Niuean catheter system was introduced into the abdominal ascites under ultrasound guidance. 4 liters of clear fluid were removed and sent to lab if requested. Patient tolerated procedure well. Ultrasound imaging was obtained and placed in the patient's permanent medical record.     IMPRESSION: 1.  Status post ultrasound-guided paracentesis. JULIAN PETE MD   SYSTEM ID:  [id number]    US Thoracentesis    Result Date: 1/13/2023  EXAM: 1. RIGHT  THORACENTESIS 2. ULTRASOUND GUIDANCE LOCATION: [facility] DATE/TIME: 1/13/2023 12:56 PM INDICATION: Pleural effusion. PROCEDURE: Informed consent obtained. Time out performed. The chest was prepped and draped in sterile fashion. 5  mL of 1 % lidocaine was infused into the local soft tissues. Under direct ultrasound guidance, a 5 Niuean catheter system was placed into the pleural effusion. 50 mL of complex right pleural fluid were removed and sent to lab, if requested. Patient tolerated procedure well. Ultrasound imaging was obtained and placed in the patient's permanent medical record.     IMPRESSION: Status post right  ultrasound-guided thoracentesis. Very little fluid could be obtained because the fluid is complex. Reference CPT Code: 92234    XR  Chest Port 1 View    Result Date: 1/13/2023  EXAM: XR CHEST PORT 1 VIEW LOCATION: [facility] DATE/TIME: 1/13/2023 5:53 PM INDICATION: SEPSIS BPA COMPARISON: 1/10/2023     IMPRESSION: Tracheostomy tube. Right-sided PICC line with tip over SVC. Right-sided central venous catheter with tip over right atrium. Small bilateral pleural effusions. Pulmonary vascular congestion. No pneumothorax. No acute osseous abnormality.    XR Chest Port 1 View    Result Date: 1/10/2023  EXAM: XR CHEST PORT 1 VIEW LOCATION: [facility] DATE/TIME: 1/10/2023 1:47 PM INDICATION: Sepsis. COMPARISON: 01/06/2023 and 01/04/2023.     IMPRESSION: Tracheostomy tube tip in good position. Right IJ central venous dialysis catheter tips in the upper right atrium. Moderate bibasilar pleural fluid and/or thickening and associated bibasilar segmental atelectasis unchanged. Mild generalized cardiac enlargement stable. Pulmonary vascularity within normal limits.         XR Chest Port 1 View    Result Date: 1/4/2023  EXAM: XR CHEST PORT 1 VIEW LOCATION: [facility] DATE/TIME: 1/4/2023 9:21 AM INDICATION: hypoxic resp failure; intermittent bloody trach secretions; orthopena; hx b l pleural effusions and b l PTX COMPARISON: Portable AP view the chest 12/27/2022; CT pulmonary angiogram 12/20/2022     IMPRESSION: Right PICC terminates in the middle third of the SVC. Tunneled right jugular approach dialysis catheter terminates in the right atrium. Tracheostomy resides within the tracheal air column. Persistent shallow lung inflation. Territories of bibasilar atelectasis are similar. Small right pleural effusion visible in the right lateral sulcus and layering into the interlobar fissures is similar. No enlarging pleural effusion. No pneumothorax. Unchanged heart and mediastinal contours. Unchanged oblique interface just lateral to the dialysis catheter/SVC and corresponds to rounded  atelectasis on prior CT.    XR Chest Port 1 View    Result Date: 12/27/2022  XR CHEST PORT 1 VIEW   12/27/2022 3:48 PM HISTORY: Shortness of breath, mucous plug? COMPARISON: Chest radiograph 12/24/2022     IMPRESSION: Stable cardiomediastinal silhouette with unchanged position of right upper extremity PICC and dialysis catheter. Tracheostomy tube tip overlies the midthoracic trachea. Persistent bilateral pleural effusions with associated bibasilar atelectasis. No new airspace consolidation or lobar atelectasis. No pneumothorax. Bones are unchanged. Percutaneous gastrostomy tube partially visualized. ABEBA HER MD   SYSTEM ID:  [id number]    XR Chest Port 1 View    Result Date: 12/24/2022  EXAM: XR CHEST PORT 1 VIEW LOCATION: [facility] DATE/TIME: 12/24/2022 1:37 PM INDICATION: respiratory faliure COMPARISON: 12/21/2022     IMPRESSION: Tracheostomy, right IJ tunneled dialysis catheter and right arm PICC unchanged. Left basilar small bore chest tube has been removed.  Stable pulmonary edema, small bilateral pleural effusions, right greater than left and mild bibasilar atelectasis. No pneumothorax. Normal heart size.    XR Chest Port 1 View    Result Date: 12/22/2022  EXAM: XR CHEST PORT 1 VIEW LOCATION: [facility] DATE/TIME: 12/22/2022 5:38 AM INDICATION: pneumothorax COMPARISON: 12/21/2022     IMPRESSION: Tracheostomy, right PICC line and right dialysis catheter. No visible pneumothorax. Right perihilar and bibasilar infiltrates. Small effusions.    XR Chest Port 1 View    Result Date: 12/21/2022  EXAM: XR CHEST PORT 1 VIEW LOCATION: [facility] DATE/TIME: 12/21/2022 4:48 AM INDICATION: pneumothorax COMPARISON: CT 12/20/2022     IMPRESSION: Tracheostomy tube. Cardiomegaly. Bilateral pleural effusions with adjacent consolidations. The previously seen bilateral pneumothoraces are not well visualized on this examination. Left-sided chest  tube at the lung base. Right PICC terminates  in the mid SVC. Right hemodialysis catheter terminates in the right atrium.    XR Chest Port 1 View    Result Date: 12/20/2022  EXAM: XR CHEST PORT 1 VIEW LOCATION: [facility] DATE/TIME: 12/20/2022 5:11 AM INDICATION: pneumothorax COMPARISON: 12/16/2022     IMPRESSION: Tracheostomy tube, right IJ dialysis catheter, and small bore left-sided chest tube are unchanged in position. The previously seen small bore right-sided chest tube is been removed. There are bilateral pleural effusions present, right greater  than left no pneumothorax is appreciated on this portable supine film. There is mild central pulmonary venous congestion noted.    XR Chest Port 1 View    Result Date: 12/20/2022  EXAM: XR CHEST PORT 1 VIEW LOCATION: [facility] DATE/TIME: 12/20/2022 6:20 AM INDICATION: s p hypoxemic arrest; mucous plugging; possible atelectasis COMPARISON: Portable radiograph same day     IMPRESSION: Tracheostomy tube, right IJ dialysis catheter, and small bore left-sided chest tube are unchanged in position. Two areas of possible kinking noted of the left chest tube. Bilateral pleural effusions present, right greater than left are stable. No pneumothorax. Stable cardiomegaly. Central pulmonary venous congestion similar to prior.    MR Brain w/o & w Contrast    Result Date: 12/20/2022  MRI OF THE BRAIN WITHOUT AND WITH CONTRAST 12/20/2022 3:15 PM COMPARISON: Head CT 12/20/2022. HISTORY:  New onset seizure, focal deficits on exam (left upper extremity paresis). TECHNIQUE: Axial diffusion-weighted with ADC map, axial T2-weighted with fat saturation, axial T1-weighted, axial turboFLAIR and coronal T1-weighted images of the brain were acquired without intravenous contrast.  Following intravenous administration of gadolinium (9 mL Gadavist), axial T1-weighted images of the brain were acquired. FINDINGS: There are tiny recent ischemic  infarcts in the left frontal lobe, left temporal lobe and posterior right frontal lobe consistent with embolic infarcts from a central embolic source. There is moderate diffuse cerebral volume loss. There are minimal patchy periventricular areas of abnormal T2 signal hyperintensity in the cerebral white matter bilaterally that are consistent with sequela of chronic small vessel ischemic disease. A small area of chronic encephalomalacia at the high posterior right parietal lobe is again noted possibly due to old traumatic or ischemic injury. The ventricles and basal cisterns are within normal limits in configuration given the degree of cerebral volume loss.  There is no midline shift.  There are no extra-axial fluid collections.  There is no evidence for acute intracranial hemorrhage.  There is no abnormal contrast enhancement in the brain or its coverings. There is no sinusitis or mastoiditis.     IMPRESSION: 1. Scattered tiny recent ischemic infarcts in the cerebral hemispheres bilaterally consistent with embolic infarcts from a central embolic source. 2. Diffuse cerebral volume loss and cerebral white matter changes consistent with chronic small vessel ischemic disease. ARY COLINDRES MD   SYSTEM ID:  [id number]    Echocardiogram Limited    Result Date: 2022  [id number] [facility], [facility]1983 [id number]^CYNTHIA^DARIA^DEION  [facility] [address] [place] [address]  Name: MARY JO CRAIN MRN: [id number] : 1965 Study Date: 2022 11:04 AM Age: 57 yrs Gender: Male Patient [facility] Reason For Study: Cardiac Arrest, Pulmonary Hypertension Ordering Physician: DARIA MARIE Referring Physician: Ki Powers Performed By: Charlotte Narayanan  BSA: 2.2 m2 Height: 72 in Weight: 206 lb HR: 63 BP: 85/54 mmHg ______________________________________________________________________________ Procedure Limited Portable Bubble Echo Adult. Optison (NDC #[id number])  given intravenously. ______________________________________________________________________________ Interpretation Summary  Normal left ventricular wall thickness. Left ventricular systolic function is normal. The visual ejection fraction is estimated at 70-75%. Left ventricular diastolic function is normal. No regional wall motion abnormalities noted. The right ventricle is normal in size and function. A contrast injection (Bubble Study) was performed that was negative for flow across the interatrial septum. No significant valve disease. Right ventricular systolic pressure could not be approximated due to inadequate tricuspid regurgitation. The inferior vena cava is normal.  No significant changes compared to previous study dated 11/13/2022.  ______________________________________________________________________________ Left Ventricle The left ventricle is normal in size. There is normal left ventricular wall thickness. Left ventricular systolic function is normal. The visual ejection fraction is estimated at 70-75%. Left ventricular diastolic function is normal. No regional wall motion abnormalities noted.  Right Ventricle The right ventricle is normal in size and function.  Atria Normal left atrial size. Right atrial size is normal. There is no color Doppler evidence of an atrial shunt. A contrast injection (Bubble Study) was performed that was negative for flow across the interatrial septum.  Mitral Valve The mitral valve leaflets appear normal. There is no evidence of stenosis, fluttering, or prolapse. There is trace mitral regurgitation.  Tricuspid Valve Normal tricuspid valve. There is trace tricuspid regurgitation. Right ventricular systolic pressure could not be approximated due to inadequate tricuspid regurgitation.  Aortic Valve The aortic valve is trileaflet with aortic valve sclerosis. There is mild (1+) aortic regurgitation. No aortic stenosis is present.  Pulmonic Valve Normal pulmonic valve. There is  trace pulmonic valvular regurgitation. Normal pulmonic valve velocity.  Vessels The aortic root is normal size. The inferior vena cava is normal.  Pericardium There is no pericardial effusion.  Rhythm Sinus rhythm was noted. ______________________________________________________________________________ MMode/2D Measurements & Calculations IVSd: 1.0 cm  LVIDd: 5.0 cm LVIDs: 3.0 cm LVPWd: 1.2 cm FS: 41.3 % LV mass(C)d: 211.1 grams LV mass(C)dI: 97.9 grams/m2 LVOT diam: 2.3 cm LVOT area: 4.0 cm2 RWT: 0.48  Doppler Measurements & Calculations MV E max brain: 60.7 cm/sec MV A max brain: 71.0 cm/sec MV E/A: 0.86 MV dec slope: 220.9 cm/sec2 MV dec time: 0.27 sec E/E' av.4 Lateral E/e': 8.3 Medial E/e': 10.4  ______________________________________________________________________________ Report approved by: Dr Kristina Jacob 2022 02:34 PM       CT Abdomen Pelvis w/o Contrast    Result Date: 2022  CT ABDOMEN/PELVIS WITHOUT CONTRAST 2022 12:49 PM CLINICAL HISTORY: Abdominal pain. Lower abdominal/pelvic pain in patient status post liver transplant. Urinary issue versus obstipation versus ascites. TECHNIQUE: CT scan of the abdomen and pelvis was performed without IV contrast. Multiplanar reformats were obtained. Dose reduction techniques were used. CONTRAST: None. COMPARISON: 2022. FINDINGS: LOWER CHEST: Pleural catheter in place on the left. Minimal left and small right effusions with associated compressive atelectasis and/or infiltrate at the lung bases. Hyperdense nodular densities partially visualized on the right. HEPATOBILIARY: No significant mass or bile duct dilatation. Cholecystectomy. Contour of the liver is similar. PANCREAS: No significant mass, duct dilatation, or inflammatory change. SPLEEN: Splenomegaly at 18.2 cm. ADRENAL GLANDS: No significant nodules. KIDNEYS/BLADDER: 4 mm stone in the inferior left kidney. Stone in the upper right kidney measures 5 mm. No ureteral stones  or hydronephrosis. BOWEL: No obstruction or inflammatory change. VASCULATURE: No abdominal aortic aneurysm. PELVIC ORGANS: No pelvic masses. OTHER: Moderate to large amount of ascites. MUSCULOSKELETAL: No frankly destructive bony lesions.     IMPRESSION: 1.  Moderate to large amount of ascites. Ascites increased from previous. 2.  No bowel obstruction or significant stool burden demonstrated. 3.  Improved pleural effusions and associated compressive atelectasis and/or infiltrate since comparison. HANS WESTON MD   SYSTEM ID:  [id number]    CTA Neck with Contrast    Result Date: 1/7/2023  EXAM: CTA NECK WITH CONTRAST LOCATION: [facility] DATE/TIME: 1/6/2023 11:55 PM INDICATION: Tracheostomy in place, worsening bloody tracheal secretions, rule out tracheoinnominate fistula. COMPARISON: None. CONTRAST: Isovue 370 100 mL TECHNIQUE: Neck CT angiogram with IV contrast. Axial helical CT images of the neck vessels were obtained during the arterial phase of intravenous contrast administration. Axial helical 2D reconstructed images and multiplanar 3D MIP reconstructed images of the neck vessels were performed by the technologist. Dose reduction techniques were used. All stenosis measurements made according to NASCET criteria unless otherwise specified. FINDINGS: RIGHT CAROTID: Mild scattered plaque. No significant stenosis or dissection. Tortuous right ICA. LEFT CAROTID: Minimal plaque noted along the left common carotid artery. Moderate calcified plaque identified involving the left carotid bifurcation and left proximal ICA which causes less than 50% stenosis. No significant stenosis or dissection identified. Tortuous ICA. VERTEBRAL ARTERIES: No focal stenosis or dissection. Dominant left and smaller right vertebral arteries. AORTIC ARCH: A tracheostomy tube is identified. No definite findings of a tracheoinnominate artery fistula identified. Two-vessel aortic arch anatomy. Minimal narrowing  identified involving the origin of the left subclavian artery. No significant stenosis identified involving the origins of the great vessels. NONVASCULAR STRUCTURES: Unremarkable.     IMPRESSION: 1.  No significant stenosis or dissection. 2.  No definite findings to suggest a tracheoinnominate artery fistula.    CT Chest Pulmonary Embolism w Contrast    Result Date: 12/20/2022  CT CHEST PULMONARY EMBOLISM W CONTRAST 12/20/2022 8:30 AM CLINICAL HISTORY: Hypoxemia. Bradycardia. TECHNIQUE: CT angiogram chest during arterial phase injection IV contrast. 2D and 3D MIP reconstructions were performed by the CT technologist. Dose reduction techniques were used. CONTRAST: 74 mL Isovue-370 COMPARISON: 12/17/2022, 12/10/2022 FINDINGS: ANGIOGRAM CHEST: No evidence of pulmonary embolism. Intimal calcified plaque in the thoracic aorta. No evidence of dissection. LUNGS AND PLEURA: Moderate to large right pleural and moderate left pleural effusions are again noted with the left effusion slightly increased in size from 12/17/2022. A right-sided pleural drain has been removed with a small amount of residual loculated air at the site of the previous drain. Again seen is high density material within the right pleural space consistent with a hemothorax. No active bleeding is identified. A tiny amount of loculated air in the left pleural space has improved. There are persistent opacities and atelectasis with partial collapse of the lower lobes. This is slightly increased on the left. Persistent opacity which is somewhat bandlike in the right upper lobe suggestive of a hematoma. Benign calcified granulomas in both lungs. No new infiltrates. Endotracheal tube in good position. MEDIASTINUM/AXILLAE: Heart size is mildly enlarged. No pericardial effusions. Right IJ and right PICC line tips in the lower SVC. No adenopathy. UPPER ABDOMEN: Cirrhotic appearance of the liver. Splenomegaly. Ascites visualized in the included upper abdomen but is  not well quantified. MUSCULOSKELETAL: Degenerative changes in the thoracic spine.     IMPRESSION: 1.  No evidence of acute pulmonary embolism. 2.  Since 12/17/2022 a moderate left pleural effusion has mildly increased in size. A small amount of loculated air in the left pleural space has decreased. Mild increase in associated compressive atelectasis in the left lower lobe which is predominantly collapsed. 3.  Interval removal of a right pleural drain with a small amount of residual air at its prior location. A moderate to large right hemothorax is unchanged. No active bleeding is visualized. Unchanged compressive atelectasis and partial collapse of the right lower lobe. 4.  Unchanged presumed parenchymal hematoma within the right upper lobe. No active bleeding. 5.  Partially visualized cirrhotic liver and splenomegaly. ALTAGRACIA MCWILLIAMS MD   SYSTEM ID:  [id number]    CT Head w/o Contrast    Result Date: 12/20/2022  CT SCAN OF THE HEAD WITHOUT CONTRAST December 20, 2022 8:32 AM HISTORY: Acute new onset seizure. TECHNIQUE: Axial images of the head and coronal reformations without IV contrast material. Radiation dose for this scan was reduced using automated exposure control, adjustment of the mA and/or kV according to patient size, or iterative reconstruction technique. COMPARISON: Head CT 12/1/2022. FINDINGS: No evidence of hemorrhage. Volume loss and white matter hypoattenuation likely representing chronic small vessel ischemic change. No acute osseous abnormality.     IMPRESSION: No acute intracranial abnormality. DARREN OSWALD MD   SYSTEM ID:  [id number]    XR Video Swallow with SLP or OT    Result Date: 1/13/2023  EXAM: XR VIDEO SWALLOW WITH SLP OR OT LOCATION: [facility] DATE/TIME: 1/13/2023 10:26 AM INDICATION: Difficulty swallowing. COMPARISON: None. TECHNIQUE: Routine swallow study with speech pathology using multiple barium thicknesses. FINDINGS: FLUOROSCOPIC TIME: 2.6 NUMBER OF IMAGES: 26  Swallow study with Speech Pathology using multiple barium thicknesses. Adequate oral phase. Delay in initiation of swallow reflux with pour over to the vallecular space during swallowing of moderately thick liquid, puree and crunchy solid consistency into the pyriform sinuses during swallowing of thin and mildly thick liquid. Complete epiglottic inversion was demonstrated. Deep laryngeal penetration and silent aspiration occurred during swallowing of thin liquid. Deep laryngeal penetration and aspiration occurred during swallowing of mildly thick liquid from an open cup. Shallow laryngeal penetration occurred during swallowing of mildly thick liquid from a spoon. No laryngeal penetration or aspiration was seen during swallowing of moderately thick liquid, puree or crunchy solid consistencies. Mild vallecular and pyriform stasis after swallowing of all consistencies.     IMPRESSION: 1.  Adequate oral phase. 2.  Delay in initiation of swallow reflex with complete epiglottic inversion. 3.  Aspiration of thin and mildly thick liquid. 4.  Mild stasis. Please refer to speech therapy dysphasia evaluation report for additional information. Examination performed by Kacey Lopez, BRANDEN/ALBERTO, RT (R) under the general supervision of Dr. Fahrner .     EEG Video 2-12 HRS Ummonitored    Result Date: 12/23/2022  EEG Video 2-12 HRS Ummonitored Result VIDEO EEG DATE: 12/23/2022 VIDEO EEG LOG: [facility]  [facility] VIDEO EEG DAY#: 4 VIDEO EEG SOURCE FILE DURATION: 3 hours 56 minutes PATIENT INFORMATION: Blanco Osborne is a 57 year old year old male who presents with seizure-like activity and PEA arrest. Video EEG is being done to evaluate for seizures.   MEDICATIONS:  See MAR These medications and doses were derived from the medical record at the time of this procedure.   TECHNICAL SUMMAR TECHNICAL SUMMARY: This is a video-EEG monitoring study. EEG was recorded from 23 scalp electrodes placed according to the 10-20 international system.  Additional electrodes were utilized for referencing, artifact detection, and recording from other cerebral regions. Qualified technicians attached EEG electrodes, set up the study, monitored and reviewed EEG recordings, and disconnected EEG electrodes when appropriate. Video was continuously recorded. Video was reviewed for clinical correlation and to assist with EEG interpretations.   BACKGROUND ACTIVITY: Background activity consisted of diffuse generalized delta slowing with superimposed alpha activity in the midline and parasagittal chain.  Well-formed parieto-occipital rhythm is rarely noted up to 6-7 hz. Well formed sleep architecture is not appreciated.   ACTIVATION PROCEDURE: EEG was reactive   INTERICTAL EPILEPTIFORM DISCHARGES: No epileptiform discharges   ICTAL: No EEG seizures.  Video was reviewed intermittently by EEG technologist and physician for clinical seizures.   IMPRESSION OF VIDEO EEG DAY # 4: Video EEG day 4 is abnormal due to presence of generalized delta theta slowing consistent with a moderate encephalopathy.   No electrographic seizure, paroxysmal behaviors, or epileptiform discharges were seen. Clinical correlation is advised.     SUMMARY OF FOUR DAYS OF VIDEO EEG: Four day of Video EEG is abnormal due to presence of generalized delta theta slowing consistent with a moderate to severe encephalopathy. On Video EEG day 3 and 4 he had posterior dominant rhythm 6-7 hz. Patient had 2 electroclinical seizure (day 1 and 2) .  Clinically he had gaze deviation to the right, head turn to the right, facial twitching, that progresses to shoulder twitching on the right side, head jerks, then whole body low amplitude twitching.  Electrographically at onset of seizure on Video EEG day 1 there was an impedance check and we are not able to localize seizure onset. Day 2 seizure had left occipital focal onset.  Sandra Beltran MD EPILEPSY STAFF     EEG Video 12-26 hr Unmonitored    Result Date: 12/23/2022  EEG  Video 12-26 hr Unmonitored Result VIDEO EEG DATE: 12/23/22 VIDEO EEG LOG: [id number] VIDEO EEG DAY#: 3 VIDEO EEG SOURCE FILE DURATION: 23 hours 52 minutes PATIENT INFORMATION: Blanco Osborne is a 57 year old year old male who presents with seizure-like activity and PEA arrest. Video EEG is being done to evaluate for seizures.   MEDICATIONS:  See MAR These medications and doses were derived from the medical record at the time of this procedure.   TECHNICAL SUMMAR TECHNICAL SUMMARY: This is a video-EEG monitoring study. EEG was recorded from 23 scalp electrodes placed according to the 10-20 international system. Additional electrodes were utilized for referencing, artifact detection, and recording from other cerebral regions. Qualified technicians attached EEG electrodes, set up the study, monitored and reviewed EEG recordings, and disconnected EEG electrodes when appropriate. Video was continuously recorded. Video was reviewed for clinical correlation and to assist with EEG interpretations.   BACKGROUND ACTIVITY: Background activity consisted of diffuse generalized delta slowing with superimposed alpha activity in the midline and parasagittal chain.  Well-formed parieto-occipital rhythm is rarely noted up to 6 hz. Well formed sleep architecture is not appreciated.   ACTIVATION PROCEDURE: EEG was reactive   INTERICTAL EPILEPTIFORM DISCHARGES: No epileptiform discharges   ICTAL: No EEG seizures.  Video was reviewed intermittently by EEG technologist and physician for clinical seizures.   IMPRESSION OF VIDEO EEG DAY #3: Video EEG day 3 is abnormal due to presence of generalized delta theta slowing consistent with a moderate encephalopathy.   No electrographic seizure, paroxysmal behaviors, or epileptiform discharges were seen. Clinical correlation is advised. Sandra Beltran MD EPILEPSY STAFF     EEG Video 12-26 hr Unmonitored    Result Date: 12/23/2022  EEG Video 12-26 hr Unmonitored Result VIDEO EEG DATE: 12/22/22  VIDEO EEG LOG: [facility]  LTV VIDEO EEG DAY#: 2 VIDEO EEG SOURCE FILE DURATION: 23 HOURS 53 MINUTES PATIENT INFORMATION: Blanco Osborne is a 57 year old year old male who presents with seizure-like activity and PEA arrest. Video EEG is being done to evaluate for seizures.   MEDICATIONS:  See MAR These medications and doses were derived from the medical record at the time of this procedure.   TECHNICAL SUMMAR TECHNICAL SUMMARY: This is a video-EEG monitoring study. EEG was recorded from 23 scalp electrodes placed according to the 10-20 international system. Additional electrodes were utilized for referencing, artifact detection, and recording from other cerebral regions. Qualified technicians attached EEG electrodes, set up the study, monitored and reviewed EEG recordings, and disconnected EEG electrodes when appropriate. Video was continuously recorded. Video was reviewed for clinical correlation and to assist with EEG interpretations.   BACKGROUND ACTIVITY: Background activity consisted of diffuse generalized delta slowing with superimposed alpha activity in the midline and parasagittal chain.  Well-formed parieto-occipital rhythm or sleep architecture is not appreciated.   ACTIVATION PROCEDURE: EEG was reactive   INTERICTAL EPILEPTIFORM DISCHARGES: No epileptiform discharges   ICTAL: Patient had 1 electroclinical seizure with onset in left occipital region.  Clinically he opens eyes at 11:18 had gaze deviation to the right then head turn to the right.  He grimaces 11:21 and stiffens with head jerks and right should jerks at 11:21:38. This progress to low amplitude body jerks then whole body low amplitude twitching.  Electrographically at onset (11:16:59) there is ictal pattern in the posterior quadrant with maximum negativity was concentrated in electrode 01, this progresses to temporal parietal region then diffuse ictal pattern consisting of rhythmic delta activity that evolves in amplitude and frequency over  time.  EEG seizure ends at 12:22:48. Seizure duration was around 4-5 minutes. Video was reviewed intermittently by EEG technologist and physician for clinical seizures.   IMPRESSION OF VIDEO EEG DAY # 2: Video EEG day 2 is abnormal due to presence of generalized delta theta slowing consistent with a severe encephalopathy.  Patient had 1 electroclinical seizure involving tonic-clonic movements of face, right shoulder that progressed to the whole body twitching with EEG seizure with focal onset at 01 (left occipital) that secondarily generalized. Prior to electroclinical seizure there were no epileptiform discharges identified on file.  Clinical correlation is advised.   Sandra Beltran MD Epilepsy Staff     EEG Video 12-26 hr Unmonitored    Result Date: 12/21/2022  EEG Video 12-26 hr Unmonitored Result VIDEO EEG DATE: 12/21/22 VIDEO EEG LOG: [facility]  LTV VIDEO EEG DAY#: 1 VIDEO EEG SOURCE FILE DURATION: 23 HOURS 48 MINUTES PATIENT INFORMATION: Blanco Osborne is a 57 year old year old male who presents with seizure-like activity and PEA arrest. Video EEG is being done to evaluate for seizures. MEDICATIONS:  See MAR These medications and doses were derived from the medical record at the time of this procedure. TECHNICAL SUMMAR TECHNICAL SUMMARY: This is a video-EEG monitoring study. EEG was recorded from 23 scalp electrodes placed according to the 10-20 international system. Additional electrodes were utilized for referencing, artifact detection, and recording from other cerebral regions. Qualified technicians attached EEG electrodes, set up the study, monitored and reviewed EEG recordings, and disconnected EEG electrodes when appropriate. Video was continuously recorded. Video was reviewed for clinical correlation and to assist with EEG interpretations. BACKGROUND ACTIVITY: Background activity consisted of diffuse generalized delta slowing with superimposed alpha activity in the midline and parasagittal chain.   Well-formed parieto-occipital rhythm or sleep architecture is not appreciated. ACTIVATION PROCEDURE: EEG was reactive INTERICTAL EPILEPTIFORM DISCHARGES: No epileptiform discharges ICTAL: Patient had 1 electroclinical seizure.  Clinically he had gaze deviation to the right, head turn to the right at 09: 50: 54.  Staff is working with patient during seizure there is facial twitching, that progresses to shoulder twitching on the right side at 09: 51: 33 and more pronounced head jerks.  Then there is whole body low amplitude twitching.  Electrographically at onset of seizure impedance checks were being performed and not able to localize seizure onset.  There is a diffuse ictal pattern consisting of rhythmic delta activity that involves in amplitude and frequency over time.  Video was reviewed intermittently by EEG technologist and physician for clinical seizures. IMPRESSION OF VIDEO EEG DAY # 1: Video EEG day 1 is abnormal due to presence of generalized delta theta slowing consistent with a severe encephalopathy.  Patient had 1 electroclinical seizure involving clonic movements of face, right shoulder that progressed to the whole body twitching.  This event appears to be generalized tonic-clonic seizure.  Electrographically there is diffuse rhythmic delta activity that evolves over time during seizure.  Prior to electroclinical seizure there were no epileptiform discharges identified on file.  Clinical correlation is advised.   Sandra Beltran MD Epilepsy Staff

## 2023-01-17 NOTE — PLAN OF CARE
Goal Outcome Evaluation:         Patient is alert but confused.vital signs are stable denied pain during the shift. Patient repeatedly refused taking scheduled medication and  and bolus feeding. Writer re-approached multiple times, patient received all scheduled evening medication. But refused the bolus feeding. Will continue monitoring patient status.       Wild BRICENO)

## 2023-01-17 NOTE — PROGRESS NOTES
[facility]  WO Nurse Inpatient Assessment     Consulted for: trach site, peg tube site - previously signed off, asjed to see for new concerns groin, back, rectal tube    Patient History (according to provider note(s):      Per ICU H+P at previous facility:  57 year old male with paroxysmal A. fib, Liver transplant (2016), CHRISTIAN on BiPAP, h/o CVA and hypertension. Patient  s/p Trach/PEG following acu had Rt sided Chest tube placed for hydroptx on 12/12/22 and  transferred to [facility] on 12/14/22 for CIP.  Pt  noted to have bloody stool on 12/15 and 12/16 and had left Ptx which required left sided chest tube leading to ~900ml of bloody output. Patient with PEA arrest 12/20/22 and then seizure while on HD.  Overall stable and getting close to being ready for transfer back to LTAC.  The following problems were addressed during his hospitalization listed by organ system.     Areas Assessed:      Areas visualized during today's visit: Posterior surfaces , Skin folds  and Under/around medical device(s): trach, rectal tube    Skin Injury Location: left groin, mid back  Last photo: NA  Skin injury due to: Skin tear  Skin history and plan of care:   unknown  Affected area:      Skin assessment: Denudement     Measurements (length x width x depth, in cm) left groin 0.4 x 5cm  Midback 2 x 2.5cm     Color: normal and consistent with surrounding tissue     Temperature  normal      Drainage: small .      Color: sanguinous      Odor: none  Pain: mild  Pain interventions prior to dressing change: patient tolerated well  Treatment goal: Heal   STATUS: initial assessment  Supplies ordered: supplies stored on unit    Attention to Internal Fecal Management System   No concerns noted, Continue Viscopaste  Internal Fecal Management System Instructions: Record output and inspect skin at anal opening every shift. **Discontinue Internal Fecal Management System  if less than 200cc of output in a 24 hour  period.**      Treatment Plan:     Mepilex to skin tears  Viscopaste around rectal tube    Orders: Reviewed    RECOMMEND PRIMARY TEAM ORDER: None, at this time  Education provided: plan of care  Discussed plan of care with: Patient, Family and Nurse  WOC nurse follow-up plan: weekly  Notify WOC if wound(s) deteriorate.  Nursing to notify the Provider(s) and re-consult the WOC Nurse if new skin concern.    DATA:     Current support surface: Standard  Low air loss (MIQUEL pump, Isolibrium, Pulsate, skin guard, etc)  Containment of urine/stool: Internal fecal management  BMI: Body mass index is 25 kg/m .   Active diet order: Orders Placed This Encounter      Soft & Bite Sized Diet (level 6) Liquidized/Moderately Thick (level 3)     Output: I/O last 3 completed shifts:  In: 565 [I.V.:30; NG/GT:535]  Out: 2000 [Other:1800; Stool:200]     Labs:   Recent Labs   Lab 01/17/23  0952 01/16/23  0635 01/14/23  0615   ALBUMIN  --   --  1.9*   HGB 8.1*   < > 7.7*   WBC  --   --  6.2    < > = values in this interval not displayed.     Pressure injury risk assessment:   Sensory Perception: 2-->very limited  Moisture: 3-->occasionally moist  Activity: 2-->chairfast  Mobility: 2-->very limited  Nutrition: 3-->adequate  Friction and Shear: 2-->potential problem  Kareem Score: 14    Ashleigh Pereira, ANANDN, RN, PHN, HNB-BC, CWOCN

## 2023-01-17 NOTE — PROGRESS NOTES
"Provider and dietician updated, new order for I.V Zofran prn for nausea and vomiting. Dietician will reevaluate pt tomorrow regarding bolus feeding, meanwhile, tube feeding bolus is stopped. Reassessed pt to determine if he needs prn Zofran, pt and wife declined Zofran. pt stated 'I am tired\".I will like to rest\".  Pt likes the strawberry Ensure Enlive ordered from dietary , had about 240 mls at 1430, pt also has 1 cup of thickened apple juice and strawberry gelatin available in the kitchen to drink if he wants. Wife is visiting in the room, staff will  continue to monitor.  "

## 2023-01-17 NOTE — PROGRESS NOTES
Pulmonary Progress Note    Admit Date: 12/29/2022  CODE: Full Code    Assessment/Plan:   57yoM with liver transplant(2016), recent prolonged hospitalization 11/12-12/15 for PEA cardiac arrest, Strep/Parainfluenza pneumonia with ARDS, MODS, ?aspergillus pna s/p 1 week of voriconazole, right PTX requiring chest tube since removed.  Now HD dependent, s/p trach/PEG.  Course further c/b new left PTX requiring chest tube(since removed), b/l pleural effusions, ascites, mucus plugging, PEA arrest and seizure on 12/20, pleural fluid cxs growing candida parapsilosis & ascites cx growing lactobacillus on antimicrobials, and CSF panel positive for HHV6 ultimately decided to not treat.  Admitted to LTAC 12/29.  Completed course of Unasyn transitioned to Augmentin for lactobacillus peritonitis, ended 1/11.      Discussion of pertinent problems:  Acute hypoxic/hypercapnic respiratory failure s/p trach: Liberated from the vent on 1/9 with acceptable VBG on trach dome.  Continues to slowly improve now tolerating trach cap trials during the day and kept his speaking valve on thru the night.         Hx bilateral pleural effusions s/p left thora 11/26.  S/p right thora 1/13 - only 50cc removed d/t complex fluid; exudative; pleural LDH very high, very low glucose, pH 7.50, high cell count.  This would qualify as a complicated parapneumonic effusion.  Clinically stable on trach dome, no fevers or leukocytosis, procal downtrending.  Cytology pending and cultures with NGTD thus far.   Reviewed with Dr. Chu who recommends ID consult and low threshold to send out if clinical decline.     Tracheostomy in place: 8 Shiley placed 11/29.  Changed to 6 Shiley prox xlt 12/8, unclear why.  Downsized to 7 Bivona 1/9 without issue.      Dysphagia s/p PEG tube.  BDT 12/30 with delayed aspiration.  Tolerating PMV trials well after trach downsize.  Repeat BDT 1/10 with no immediate and faint delayed aspiration.  Aspiration on VFSS 1/13 ->started  soft/bite sized w/ moderately thick liquids     Candida in left pleural fluid cultures: started on Micafungin switched to fluconazole 1/3 based on cx sensitivities.  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  May need TIPS in the future     CHRISTIAN on home BiPAP.  Managed currently with trach and vent     Seizure after hypoxic event on 12/20 with subsequent seizure 12/21 during dialysis:   - Keppra and vimpat indefinitely     Multifocal acute ischemic strokes on MRI(12/20).      Hx b/l pneumothoraces requiring b/l chest tubes, both since removed.  Stable on recent imaging     Hx mucus plugging of bronchi: stable on current regimen    Acute on chronic Anemia: s/p multiple blood transfusions, last on 1/13.  Apixaban on hold since 1/4.  Initially with bloody secretions which have since resolved.  Hgb stable    Plan:   - Phase 3 weans: Cap day, TM/PMV night   - start IS and flutter valve now that he's capped   - follow pleural cultures and cytology - NGTD thus far on cultures  - consult ID to help weigh in if antibiotics warranted for complicated parapneumonic effusion   - If he spikes a temp or has any clinical decline, low threshold to send out to higher level of care for surgery consult and likely placement of right chest tube with lytics   - Bloody secretions have resolved - apixaban restarted   - Bronchial hygiene with Albuterol + mucomyst nebs QID   - Repeat chest imaging ~1/23 before consider stopping Fluconazole      Henry Fenton CNP  Pulmonary Medicine  [facility]  Pager [phone]    Subjective/Interim Events:   - denies sob, n/v, fever, chills.  No new pains.  Tired   - no overnight events    Tracheal secretions:  Overnight - x4, sm/mod, thick  Yesterday - x3, sm/lg,  thick    Cough strength: weak/moderate    Current phase of ventilator weaning pathway:  Phase 2    Ventilator weaning results: continuous TM since 1/10    1/16: TM/PMV continuous, capped 1hr 25min     Clinical status discussed today with respiratory therapist, patient, pt's brother, wife(asking about starting probiotics)    Medications:       dextrose       - MEDICATION INSTRUCTIONS -         sodium chloride 0.9%  300 mL Hemodialysis Machine During Dialysis/CRRT (from stock)     acetaminophen  975 mg Per Feeding Tube Q8H BIJU    Or     acetaminophen  650 mg Rectal Q8H BIJU     acetylcysteine  2 mL Nebulization 4x daily     albuterol  2.5 mg Nebulization 4x daily     apixaban ANTICOAGULANT  5 mg Oral or Feeding Tube BID     carboxymethylcellulose PF  1 drop Both Eyes TID     chlorhexidine  15 mL Mouth/Throat BID     epoetin mirta-epbx  40,000 Units Subcutaneous Weekly     fluconazole  200 mg Per Feeding Tube At Bedtime     folic acid  1 mg Oral or Feeding Tube Daily     heparin Lock (1000 units/mL High concentration)  1,900 Units Intracatheter During Dialysis/CRRT (from stock)     heparin Lock (1000 units/mL High concentration)  1,900 Units Intracatheter During Dialysis/CRRT (from stock)     insulin aspart  1-6 Units Subcutaneous Daily     insulin glargine  17 Units Subcutaneous Daily     lacosamide  100 mg Oral or Feeding Tube BID     lactulose  20 g Oral or Feeding Tube BID     levETIRAcetam  1,000 mg Oral or Feeding Tube Q24H     Melatonin  6 mg Oral or NG Tube At Bedtime     midodrine  10 mg Oral During Dialysis/CRRT (from stock)     midodrine  5 mg Oral or Feeding Tube TID w/meals     mineral oil-hydrophilic petrolatum   Topical Daily     multivitamin RENAL  1 tablet Per Feeding Tube Daily     nystatin  500,000 Units Swish & Swallow 4x Daily     pantoprazole  40 mg Per Feeding Tube BID     QUEtiapine  75 mg Oral or Feeding Tube At Bedtime     rifaximin  550 mg Per Feeding Tube BID     sodium chloride (PF)  10-30 mL  Intracatheter Q8H     tacrolimus  1 mg Oral or Feeding Tube BID         Exam/Data:   Vitals  /56 (BP Location: Left arm)   Pulse 100   Temp 98.5  F (36.9  C) (Oral)   Resp 20   Wt 83.6 kg (184 lb 4.8 oz)   SpO2 100%   BMI 25.00 kg/m       I/O last 3 completed shifts:  In: 565 [I.V.:30; NG/GT:535]  Out: 2000 [Other:1800; Stool:200]  Weight change:     Vent Mode: Trach collar  FiO2 (%): 35 %  Resp: 20      EXAM:  Gen: no distress sitting in chair with trach cap on   HEENT: NT, trach midline/intact, no stridor   CV: RRR  Resp: CTAB anteriorly, diminished b/l lower lobes with crackles on right side; non-labored   Abd: soft, non tender, BS hypoactive  Skin: no visible rashes or lesions, scattered ecchymosis(improved), fragile   Ext: mild pedal edema, weak  Neuro: alert, follows commands    ROS:  A 10-system review was obtained and is negative with the exception of the symptoms noted above.    Labs:  Basic Metabolic Panel  Recent Labs   Lab 01/17/23  0952 01/17/23  0555 01/16/23  0635 01/16/23  0624 01/14/23  0759 01/14/23  0615 01/14/23  0550 01/13/23  1753 01/12/23  0625 01/12/23  0621     --  133*  --   --  134*  --  136   < > 136   POTASSIUM 3.6  --  4.0  --   --  4.1  --  3.7   < > 3.5   CHLORIDE  --   --  94*  --   --  97*  --   --   --  99   CO2  --   --  27  --   --  28  --   --   --  27   BUN  --   --  54.7*  --   --  34.1*  --   --   --  45.6*   CR  --   --  3.18*  --   --  1.94*  --   --   --  2.08*    92 96 110*   < > 78   < > 112   < > 152*    < > = values in this interval not displayed.     CBC RESULTS: Recent Labs   Lab Test 01/09/23  0656 01/05/23  1442 01/04/23  0623 12/27/22  0420   WBC  --   --   --  7.5   RBC  --   --   --  2.76*   HGB 7.3*   < > 6.3* 8.8*   HCT  --   --   --  29.4*   MCV  --   --   --  107*   MCH  --   --   --  31.9   MCHC  --   --   --  29.9*   RDW  --   --   --  18.7*   PLT  --   --  58* 63*    < > = values in this interval not displayed.      Venous Blood  Gas  Recent Labs   Lab 01/17/23  0952 01/13/23  1753 01/12/23  2245 01/12/23  0442   PHV 7.39 7.41 7.41 7.40   PCO2V 50 52* 47 49   PO2V 34 29 33 34   HCO3V 28 30 28 29   MITA 5.0* 7.7* 5.5* 5.5*        RADIOLOGY: Personally reviewed; radiology read below   CTA CHEST ABDOMEN PELVIS W CONTRAST 1/6/2023                                                                   IMPRESSION:  1. No obvious active bleeding identified.  2. Large amount of abdominal/pelvic ascites, splenomegaly, and scattered varicosities. The above findings would be most typical for sequelae of portal venous hypertension.  3. Significant stranding of the omental fat and intraperitoneal fat, this is nonspecific, but may be reactive or neoplastic in nature.  4. Percutaneous gastrostomy tube, tracheostomy, and the rectal tube appear to be in good position.  5. Central catheter with tip in the inferior right atrium.  6. Mild to moderate right pleural effusion and mild left pleural effusion, both of these appear to be loculated given the peripheral enhancement. Presumed sequelae of prior hematoma seen in the right lung.  7. Minute amount of gas seen associated with the left pleural effusion and this is presumed to be iatrogenic in nature.  8. Slight filling defect within the lower trachea most typical for slight mucous.  9. Enlargement of the main pulmonary artery presumed to represent sequelae of pulmonary arterial hypertension.   ----------------  CTA NECK WITH CONTRAST 1/6/2023     INDICATION: Tracheostomy in place, worsening bloody tracheal secretions, rule out tracheoinnominate fistula.                                                                   IMPRESSION:   1.  No significant stenosis or dissection.  2.  No definite findings to suggest a tracheoinnominate artery fistula.  ----------------------  XR CHEST  1/4/2023                                                                   IMPRESSION:      Right PICC terminates in the middle third of  the SVC. Tunneled right jugular approach dialysis catheter terminates in the right atrium. Tracheostomy resides within the tracheal air column.     Persistent shallow lung inflation. Territories of bibasilar atelectasis are similar. Small right pleural effusion visible in the right lateral sulcus and layering into the interlobar fissures is similar. No enlarging pleural effusion. No pneumothorax.     Unchanged heart and mediastinal contours. Unchanged oblique interface just lateral to the dialysis catheter/SVC and corresponds to rounded atelectasis on prior CT.

## 2023-01-17 NOTE — PROGRESS NOTES
Pt c/o of stomach upset, around 1250, ate only 10 % of lunch, started bolus tube feeding at 13 10, pt complained of nausea, abdomen painful on touch, abdomen a little distended.Writer will update provider.

## 2023-01-17 NOTE — PROGRESS NOTES
[facility]    Medicine Progress Note - Hospitalist Service    Date of Admission:  12/29/2022    Brief history:  Blanco Osborne is an 57 year old male who is transferred from [facility] f to [facility] for  IV antibiotic treatment.  Patient has multiple medical problems including history of liver transplant 2016, PAF on chronic anticoagulation, history of DM2, CVA, HTN, CHRISTIAN on BiPAP, and history of alcohol abuse in the past causing liver damage ending with liver transplant.  About 6 weeks ago he had respiratory arrest and was diagnosed with infection with parainfluenza and strep pneumoniae and acute on chronic renal insufficiency ending with CRF needing 3/week hemodialysis.  During this period he was diagnosed with cirrhosis of transplanted liver and hyperammonia.  Currently he is on Lactulose and Rifaximin.        On 12/14/22, due to CIP, he was transferred to [facility] for the first time with Trach, PEG and Rt chest tube.    On 12/16/2022 patient was transferred back to [facility] due to respiratory insufficiency secondary to hydropneumothorax and drainage of 900 cc bloody pleural effusion, bloody stool and transfusion of 2 units PRBC.    On 12/20/2022 he had another episode of PEA arrest followed by CPR x2 minutes and possibly had seizure activity in [facility]. 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  He has been on ventilator and has improved to trach dome tolerating up to 6 hours so far. He had chest tube which was removed.    He had SBP with growth of lactobacillus and is currently on Unasyn which will be switched to Augmentin orally through 1/12/2023.    Pleural effusion grew Candida and is on micafungin.  He is also on  due to cirrhosis and hyperammonia.  He is anemic and is on Retacrit.   He is on lacosamide and Keppra due to history of seizure.  There is question of him drinking again  causing him cirrhosis of the transplanted liver.    LTAC course:  1/10/2023-1/15/2023.  Dialyzing. 1/12,Overnight patient had to be resuscitated with 1 L normal saline bolus after BPA sepsis fired noted to have lactic acid of 2.3. Following morning Hb is 6.6.  Transfused 1 unit of packed red blood cells.1/13,Patient had paracentesis and thoracentesis about 7500 cc of ascitic fluid and 50 cc of pleural fluid yielded. Cytology pending.1/15. Just about the same compared to yesterday. Hb fairly stable. No new changes at this time, continue with current medical management    1/16-1/17: Hgb 7.9, stable.  Apixaban resumed 1/17 since no further tracheal bleeding.  Right thoracentesis 1/13 fluid considered exudative, likely parapneumonic complicated effusion per Pulmonology.  Clinically, no fevers, normal WBC, right pleural fluid cultures NGTD.  ID consulted for antibiotic recommendations.  Lantus decreased to 17 units from 20 given relatively low glucose in the last few days.  Abdomen remains soft following large-volume 7.5 L paracentesis 1/13/2023.     Assessment & Plan   Principal Problem:    Acute on chronic respiratory failure with hypoxia (H)  Active Problems:    Acquired immunocompromised state (H)    Cirrhosis of liver (H)    NAFLD (nonalcoholic fatty liver disease)    Liver transplanted (H)    Immunosuppression (H)    Critical illness myopathy    Seizures (H)    History of sudden cardiac arrest, PEA    ESRD (end stage renal disease) on dialysis (H)    Anemia of chronic disease due to ESRD, cirrhosis and hypersplenism      Acute now chronic respiratory failure requiring tracheostomy.  Initial event was parainfluenza and strep pneumo pneumonia.  Had failed extubation x2 and tracheostomy performed last hospitalization.  Had additional complications of bilateral pneumothoraces.  (Most recently was a hydropneumothorax where fluid grew Candida.. Chest tube was placed and removed . Had decent tolerance for pressure support  and trach dome but after few hours and trach dome 12/27 had a pretty significant episode of desaturation.   -Wean ventilator per Pulmonology.  -Tracheostomy care per RT    Pneumonia  ARDS  Right pneumothorax  Bilateral pleural effusions  Treated for ARDS pneumonia, Aspergillus pneumonia during prior hospitalization and right chest tube now removed.  Left pleural fluid positive for Candida parapsilosis treated with IV micafungin and currently on fluconazole, to finish 4-week course.  Concern for right complicated parapneumonic effusion. S/p thoracentesis 11/26 and 1/13.  Right thoracentesis 1/13 consistent with exudative effusion with elevated LDH, high neutrophils, cultures show NGTD.    -Pulmonology considering surgical intervention if any further deterioration  -Continue fluconazole to finish course for left pleural fluid Candida parapsilosis   -ID consulted 1/17 for further antibiotic recommendations    History PEA  Hypotension  PEA arrest prior to his previous admission and 1 episode of PEA associated with desaturation on 12/20.  No structural cardiac disease but does have A. Fib which neurologist thought might have been contributory to his embolic strokes.  Anticoagulation been restarted with stable labs although high risk for bleeding seconday to thrombocytopenia.  Also has developed baseline hypotension and is on midodrine 10 mg 3 times daily.  -Continue current midodrine dose      Infectious disease  At prior hospital:  1. 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 4.  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  Have had some conversations within our group and with transplant ID and general infectious disease.  General consensus is that ganciclovir probably could be discontinued  2. H/o Strep/Parainfluenza infection last hospitalization. Completed treatment course  3. 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  4.  Lactobacillus growing in the broth 4 days after paracentesis.  Cell count was very low.  5.  Candida in left pleural  fluid cultures.  Sensitivities not resulted.  6.  Immunosuppressed on tacrolimus.  Discussed with transplant service at the [facility] who felt that given the absence of rejection on his most recent biopsy and ongoing issues with infection continuing with 1 twice daily tacrolimus is the best current option.  They have seen his subtherapeutic trough levels.  -Received Unasyn for 2 weeks total.  Changed to Augmentin, finished course on 1/12/2023.  -At least 4 weeks of antimicrobial for the Candida.   -Continue tacrolimus as above and tapering steroids  -Remains off ganciclovir    S/p Liver transplant 2016  Cirrhosis with ascites  Subacute bacterial peritonitis  Main manifestations of this are hepatic encephalopathy and ascites.  Hepatologist at [facility] felt that most likely reason for the cirrhosis was metabolic syndrome or alcohol intake.  There was no evidence of rejection on biopsy and there was some evidence of regeneration. Paracentesis done on 12/22/2022 showed lactobacillus indicating SBP, treated with Unasyn and Augmentin, finished 2-week course 1/12/2023.  Requires large-volume paracentesis periodically for recurring ascites.  -Paracentesis 1/13/2023 yielded about 7500 cc.  Cytology studies pending.  Ascites fluid cultures NGTD.    -Continue intermittent paracentesis as needed if develops symptomatic ascites.    -Further treatment for recurrent ascites with TIPS defer to his Liver Transplant team and Hepatology follow-up appointment  -He has an appointment on 4/21/2023 with Hepatology, Dr. Simms  -Tacrolimus 1 mg BID and tapering steroids   -Lactulose and Rifaximin as above    Seizure after hypoxic event.  This is in the context of baseline multifactorial encephalopathy secondary to his ongoing critical illness and prior cardiac arrests and prior strokes and cirrhosis with hepatic encephalopathy. MRI of head of 12/20/22 reveals no PRES or leptomeningeal enhancement but scattered.  HHV6+ in CSF is regarded  by neurology as unlikely to be a pathogen and Gancyclovir was stopped.   - Continue with  Keppra  indefinitely.  Neurology follow-up once discharge from LTACH.    Paroxysmal atrial fibrillation  Remains in sinus rhythm.  On anticoagulation with apixaban indefinitely for stroke prophylaxis.  Anticoagulation held since 1/6 due to tracheal bleeding noted during suctioning.  Apixaban resumed 1/17 since no further tracheal bleeding  -Continue anticoagulation indefinitely for secondary stroke prevention with apixaban      ESRD: Now on iHD.  No return of renal function.  - MWF schedule     Acute anemia  Status post 1 unit PRBCs transfusion 1/12/2023  -Repeat Hb 7.8, 1/12/2023 and remains stable  -No evidence of overt bleeding.  -Anemia most likely secondary to critical illness/chronic disease, chronic renal disease  -Type screen and transfuse packed red blood cells to keep Hb> 7  -Monitor H&H.    Diabetes mellitus type 2  1. DM.  Titrating insulin. He needs supplemental coverage.  2. He is on steroids which are weaning  Plan:  -Continue with current regimen.   -Lantus decreased to 17 units from 20 given relatively low glucose on 1/17    -Hypoglycemic protocol in place    Moderate malnutrition, protein and calorie type.  -Continue with tube feeds via PEG tube  -Nutritionist consulted and following     Pancytopenia due cirrhosis, ESRD and hypersplenism. WBC count has improved.  However he needs frequent PRBC transfusion due Hb <7.0.  Patient is on anticoagulation for PAF.  Currently in sinus rhythm.    -Monitor        Diet: Adult Formula Bolus Feeding: Novasource Renal; Route: Gastrostomy; 3 times daily; Volume per Bolus: 250; mL(s); Additional free water (mL): 100 ml b/4 and after each bolus feeding if given; 125 ml/h x 2 h back up bolus feeding after each meal ONLY...  Soft & Bite Sized Diet (level 6) Liquidized/Moderately Thick (level 3)  Snacks/Supplements Adult: Gelatein Plus; Between Meals  Snacks/Supplements Adult:  Ensure Enlive; Between Meals    DVT Prophylaxis: DOAC  Nixon Catheter: Not present  Central Lines: PRESENT      Cardiac Monitoring: None  Code Status: Full Code      Disposition Plan     Expected Discharge Date: 01/30/2023    Discharge Delays: Complex Discharge    Discharge Comments: Vent. Trach. Phase 1. PEG.  Rectal Tube.      The patient's care was discussed with the Bedside Nurse, Patient and Patient's Family.    SPARKLE CRUZ MD  Hospitalist Service  LT[facility]  Securely message with the Vocera Web Console (learn more here)  Text page via [employer] Paging/Directory     Clinically Significant Risk Factors              # Hypoalbuminemia: Lowest albumin = 1.8 g/dL at 12/29/2022  4:40 AM, will monitor as appropriate             # Severe Malnutrition: based on nutrition assessment      _____________________________________________________________________    Interval History   No new events overnight per RN.  .  Resting in bed receiving HD in the room.    Glucose in low to mid 100s.  Wife at bedside updated.     Data reviewed today: I reviewed all medications, new labs and imaging results over the last 24 hours. I personally reviewed.    Physical Exam   Vital Signs: Temp: 97.6  F (36.4  C) Temp src: Oral BP: 103/62 Pulse: 97   Resp: 24 SpO2: 96 % O2 Device: Trach dome Oxygen Delivery: 30 LPM  Weight: 184 lbs 4.8 oz   General: Alert, oriented.  Frail and debilitated.  HEENT: Normal conjunctiva, anicteric.  Neck: Supple.  Trach noted  Lungs: Clear to auscultation  Heart: S1, S2.  No murmurs  Abdomen: Soft, minimally distended, ascites noted, bowel sounds noted  Extremities: No peripheral edema.  PICC line in place.  Skin: Ecchymotic spots around neck.  No rash or ulcers.    Data   Recent Labs   Lab 01/17/23  0555 01/16/23  0635 01/16/23  0624 01/14/23  0759 01/14/23  0615 01/14/23  0550 01/13/23  1753 01/13/23  1750 01/13/23  0553 01/12/23 0625 01/12/23 0621   WBC  --   --   --   --  6.2  --   --  7.0  --   --  4.7   HGB  --   7.9*  --   --  7.7*  --  9.0* 8.6*  --    < > 6.6*   MCV  --   --   --   --  99  --   --  99  --   --  103*   PLT  --   --   --   --  214  --   --  253  --   --  167   NA  --  133*  --   --  134*  --  136  --   --    < > 136   POTASSIUM  --  4.0  --   --  4.1  --  3.7  --   --    < > 3.5   CHLORIDE  --  94*  --   --  97*  --   --   --   --   --  99   CO2  --  27  --   --  28  --   --   --   --   --  27   BUN  --  54.7*  --   --  34.1*  --   --   --   --   --  45.6*   CR  --  3.18*  --   --  1.94*  --   --   --   --   --  2.08*   ANIONGAP  --  12  --   --  9  --   --   --   --   --  10   KELLY  --  9.4  --   --  8.9  --   --   --   --   --  8.9   GLC 92 96 110*   < > 78   < > 112  --   --    < > 152*   ALBUMIN  --   --   --   --  1.9*  --   --   --   --   --  2.1*   PROTTOTAL  --   --   --   --  6.4  --   --   --  6.5  --  6.4   BILITOTAL  --   --   --   --  0.7  --   --   --   --   --  0.4   ALKPHOS  --   --   --   --  250*  --   --   --   --   --  368*   ALT  --   --   --   --  11  --   --   --   --   --  11   AST  --   --   --   --  23  --   --   --   --   --  19    < > = values in this interval not displayed.     Medications     dextrose       - MEDICATION INSTRUCTIONS -         sodium chloride 0.9%  300 mL Hemodialysis Machine During Dialysis/CRRT (from stock)     acetaminophen  975 mg Per Feeding Tube Q8H [facility]    Or     acetaminophen  650 mg Rectal Q8H BIJU     acetylcysteine  2 mL Nebulization 4x daily     albuterol  2.5 mg Nebulization 4x daily     apixaban ANTICOAGULANT  5 mg Oral or Feeding Tube BID     carboxymethylcellulose PF  1 drop Both Eyes TID     chlorhexidine  15 mL Mouth/Throat BID     epoetin mirta-epbx  40,000 Units Subcutaneous Weekly     fluconazole  200 mg Per Feeding Tube At Bedtime     folic acid  1 mg Oral or Feeding Tube Daily     heparin Lock (1000 units/mL High concentration)  1,900 Units Intracatheter During Dialysis/CRRT (from stock)     heparin Lock (1000 units/mL High concentration)   1,900 Units Intracatheter During Dialysis/CRRT (from stock)     insulin aspart  1-6 Units Subcutaneous Daily     insulin glargine  20 Units Subcutaneous Daily     lacosamide  100 mg Oral or Feeding Tube BID     lactulose  20 g Oral or Feeding Tube BID     levETIRAcetam  1,000 mg Oral or Feeding Tube Q24H     Melatonin  6 mg Oral or NG Tube At Bedtime     midodrine  10 mg Oral During Dialysis/CRRT (from stock)     midodrine  5 mg Oral or Feeding Tube TID w/meals     mineral oil-hydrophilic petrolatum   Topical Daily     multivitamin RENAL  1 tablet Per Feeding Tube Daily     nystatin  500,000 Units Swish & Swallow 4x Daily     pantoprazole  40 mg Per Feeding Tube BID     QUEtiapine  75 mg Oral or Feeding Tube At Bedtime     rifaximin  550 mg Per Feeding Tube BID     sodium chloride (PF)  10-30 mL Intracatheter Q8H     tacrolimus  1 mg Oral or Feeding Tube BID

## 2023-01-17 NOTE — PLAN OF CARE
Problem: Pain Acute  Goal: Optimal Pain Control and Function  Outcome: Progressing  Intervention: Prevent or Manage Pain  Recent Flowsheet Documentation  Taken 1/17/2023 0159 by Vidhi Hauser RN  Medication Review/Management: medications reviewed     Problem: Mechanical Ventilation Invasive  Goal: Mechanical Ventilation Liberation  Intervention: Promote Extubation and Mechanical Ventilation Liberation  Recent Flowsheet Documentation  Taken 1/17/2023 0159 by Vidhi Hauser RN  Medication Review/Management: medications reviewed     Problem: Mechanical Ventilation Invasive  Goal: Optimal Device Function  Intervention: Optimize Device Care and Function  Recent Flowsheet Documentation  Taken 1/17/2023 0159 by Vidhi Hauser RN  Airway Safety Measures:   all equipment/monitors on and audible   suction at bedside   suction equipment   oxygen flowmeter   Goal Outcome Evaluation:    Blanco slept on and off overnight. VSS, denied pain. Alert and oriented to self and place, forgetful. Would request to take medical devices off and did not appear to remember when staff explained that he needed these devices at this time. Rectal tube in place overnight, 1 large bypass at start of shift requiring complete bed change; after this the rectal tube has been draining normally. Pt requested to be fully side-lying with turns. No PRN medications given.

## 2023-01-18 ENCOUNTER — APPOINTMENT (OUTPATIENT)
Dept: SPEECH THERAPY | Facility: CLINIC | Age: 58
DRG: 207 | End: 2023-01-18
Attending: HOSPITALIST
Payer: COMMERCIAL

## 2023-01-18 ENCOUNTER — APPOINTMENT (OUTPATIENT)
Dept: PHYSICAL THERAPY | Facility: CLINIC | Age: 58
DRG: 207 | End: 2023-01-18
Attending: HOSPITALIST
Payer: COMMERCIAL

## 2023-01-18 ENCOUNTER — ANCILLARY PROCEDURE (OUTPATIENT)
Dept: CT IMAGING | Facility: CLINIC | Age: 58
DRG: 207 | End: 2023-01-18
Attending: INTERNAL MEDICINE
Payer: COMMERCIAL

## 2023-01-18 LAB
BACTERIA BLD CULT: NO GROWTH
BACTERIA BLD CULT: NO GROWTH
BACTERIA CSF CULT: NO GROWTH
BACTERIA FLD CULT: NO GROWTH
BACTERIA PLR CULT: NO GROWTH
BASE EXCESS BLDV CALC-SCNC: NORMAL MMOL/L
CA-I BLD-MCNC: NORMAL MG/DL
GLUCOSE BLD-MCNC: NORMAL MG/DL
GLUCOSE BLDC GLUCOMTR-MCNC: 65 MG/DL (ref 70–99)
GLUCOSE BLDC GLUCOMTR-MCNC: 80 MG/DL (ref 70–99)
GRAM STAIN RESULT: NORMAL
GRAM STAIN RESULT: NORMAL
HCO3 BLDV-SCNC: NORMAL MMOL/L
HGB BLD-MCNC: NORMAL G/DL
LACTATE BLD-SCNC: NORMAL MMOL/L
PCO2 BLDV: NORMAL MM[HG]
PH BLDV: NORMAL [PH]
PHOSPHATE SERPL-MCNC: 4.2 MG/DL (ref 2.5–4.5)
PO2 BLDV: NORMAL MM[HG]
POTASSIUM BLD-SCNC: NORMAL MMOL/L
SATV LHE POCT: NORMAL
SODIUM BLD-SCNC: NORMAL MMOL/L

## 2023-01-18 PROCEDURE — 258N000003 HC RX IP 258 OP 636: Performed by: NURSE PRACTITIONER

## 2023-01-18 PROCEDURE — 250N000013 HC RX MED GY IP 250 OP 250 PS 637: Performed by: HOSPITALIST

## 2023-01-18 PROCEDURE — 999N000009 HC STATISTIC AIRWAY CARE

## 2023-01-18 PROCEDURE — 84100 ASSAY OF PHOSPHORUS: CPT | Performed by: HOSPITALIST

## 2023-01-18 PROCEDURE — 272N000202 HC AEROBIKA WITH MANOMETER

## 2023-01-18 PROCEDURE — 99232 SBSQ HOSP IP/OBS MODERATE 35: CPT | Performed by: NURSE PRACTITIONER

## 2023-01-18 PROCEDURE — 92526 ORAL FUNCTION THERAPY: CPT | Mod: GN | Performed by: SPEECH-LANGUAGE PATHOLOGIST

## 2023-01-18 PROCEDURE — 120N000017 HC R&B RESPIRATORY CARE

## 2023-01-18 PROCEDURE — 94799 UNLISTED PULMONARY SVC/PX: CPT

## 2023-01-18 PROCEDURE — 99232 SBSQ HOSP IP/OBS MODERATE 35: CPT

## 2023-01-18 PROCEDURE — 97530 THERAPEUTIC ACTIVITIES: CPT | Mod: GP | Performed by: PHYSICAL THERAPIST

## 2023-01-18 PROCEDURE — 250N000009 HC RX 250: Performed by: NURSE PRACTITIONER

## 2023-01-18 PROCEDURE — 99233 SBSQ HOSP IP/OBS HIGH 50: CPT | Performed by: FAMILY MEDICINE

## 2023-01-18 PROCEDURE — 250N000009 HC RX 250: Performed by: HOSPITALIST

## 2023-01-18 PROCEDURE — 250N000011 HC RX IP 250 OP 636: Performed by: NURSE PRACTITIONER

## 2023-01-18 PROCEDURE — 94640 AIRWAY INHALATION TREATMENT: CPT

## 2023-01-18 PROCEDURE — 250N000013 HC RX MED GY IP 250 OP 250 PS 637: Performed by: NURSE PRACTITIONER

## 2023-01-18 PROCEDURE — 999N000253 HC STATISTIC WEANING TRIALS

## 2023-01-18 PROCEDURE — 94640 AIRWAY INHALATION TREATMENT: CPT | Mod: 76

## 2023-01-18 PROCEDURE — 90935 HEMODIALYSIS ONE EVALUATION: CPT

## 2023-01-18 PROCEDURE — 250N000011 HC RX IP 250 OP 636: Performed by: INTERNAL MEDICINE

## 2023-01-18 PROCEDURE — 97112 NEUROMUSCULAR REEDUCATION: CPT | Mod: GP | Performed by: PHYSICAL THERAPIST

## 2023-01-18 PROCEDURE — 250N000012 HC RX MED GY IP 250 OP 636 PS 637: Performed by: HOSPITALIST

## 2023-01-18 PROCEDURE — 82330 ASSAY OF CALCIUM: CPT

## 2023-01-18 PROCEDURE — 999N000157 HC STATISTIC RCP TIME EA 10 MIN

## 2023-01-18 PROCEDURE — 74177 CT ABD & PELVIS W/CONTRAST: CPT

## 2023-01-18 RX ORDER — IOPAMIDOL 755 MG/ML
100 INJECTION, SOLUTION INTRAVASCULAR ONCE
Status: COMPLETED | OUTPATIENT
Start: 2023-01-18 | End: 2023-01-18

## 2023-01-18 RX ORDER — ACETYLCYSTEINE 200 MG/ML
2 SOLUTION ORAL; RESPIRATORY (INHALATION) 2 TIMES DAILY
Status: DISCONTINUED | OUTPATIENT
Start: 2023-01-18 | End: 2023-01-21 | Stop reason: HOSPADM

## 2023-01-18 RX ADMIN — FOLIC ACID 1 MG: 1 TABLET ORAL at 08:28

## 2023-01-18 RX ADMIN — TACROLIMUS 1 MG: 5 CAPSULE ORAL at 08:26

## 2023-01-18 RX ADMIN — FLUCONAZOLE 200 MG: 200 TABLET ORAL at 21:24

## 2023-01-18 RX ADMIN — ACETAMINOPHEN 650 MG: 650 SOLUTION ORAL at 00:35

## 2023-01-18 RX ADMIN — TACROLIMUS 1 MG: 5 CAPSULE ORAL at 17:55

## 2023-01-18 RX ADMIN — Medication 6 MG: at 21:23

## 2023-01-18 RX ADMIN — NYSTATIN 500000 UNITS: 100000 SUSPENSION ORAL at 08:28

## 2023-01-18 RX ADMIN — LACOSAMIDE 100 MG: 10 SOLUTION ORAL at 08:27

## 2023-01-18 RX ADMIN — Medication 1 DROP: at 08:29

## 2023-01-18 RX ADMIN — Medication 1 DROP: at 21:24

## 2023-01-18 RX ADMIN — CHLORHEXIDINE GLUCONATE 0.12% ORAL RINSE 15 ML: 1.2 LIQUID ORAL at 21:22

## 2023-01-18 RX ADMIN — LACTULOSE 20 G: 20 SOLUTION ORAL at 21:22

## 2023-01-18 RX ADMIN — Medication 40 MG: at 08:29

## 2023-01-18 RX ADMIN — RIFAXIMIN 550 MG: 550 TABLET ORAL at 08:27

## 2023-01-18 RX ADMIN — ACETAMINOPHEN 650 MG: 325 TABLET, FILM COATED ORAL at 18:07

## 2023-01-18 RX ADMIN — SODIUM CHLORIDE 300 ML: 9 INJECTION, SOLUTION INTRAVENOUS at 15:30

## 2023-01-18 RX ADMIN — NYSTATIN 500000 UNITS: 100000 SUSPENSION ORAL at 16:56

## 2023-01-18 RX ADMIN — HEPARIN SODIUM 1900 UNITS: 1000 INJECTION INTRAVENOUS; SUBCUTANEOUS at 17:19

## 2023-01-18 RX ADMIN — QUETIAPINE 75 MG: 25 TABLET ORAL at 21:23

## 2023-01-18 RX ADMIN — IOPAMIDOL 100 ML: 755 INJECTION, SOLUTION INTRAVENOUS at 13:24

## 2023-01-18 RX ADMIN — MIDODRINE HYDROCHLORIDE 5 MG: 5 TABLET ORAL at 08:28

## 2023-01-18 RX ADMIN — ACETYLCYSTEINE 2 ML: 200 SOLUTION ORAL; RESPIRATORY (INHALATION) at 20:06

## 2023-01-18 RX ADMIN — LEVETIRACETAM 1000 MG: 100 SOLUTION ORAL at 21:22

## 2023-01-18 RX ADMIN — ALBUTEROL SULFATE 2.5 MG: 2.5 SOLUTION RESPIRATORY (INHALATION) at 11:34

## 2023-01-18 RX ADMIN — Medication 1 DROP: at 13:47

## 2023-01-18 RX ADMIN — LACOSAMIDE 100 MG: 10 SOLUTION ORAL at 21:23

## 2023-01-18 RX ADMIN — ALBUTEROL SULFATE 2.5 MG: 2.5 SOLUTION RESPIRATORY (INHALATION) at 07:26

## 2023-01-18 RX ADMIN — WHITE PETROLATUM: 1.75 OINTMENT TOPICAL at 08:30

## 2023-01-18 RX ADMIN — ACETYLCYSTEINE 2 ML: 200 SOLUTION ORAL; RESPIRATORY (INHALATION) at 07:26

## 2023-01-18 RX ADMIN — ACETAMINOPHEN 975 MG: 325 TABLET, FILM COATED ORAL at 06:06

## 2023-01-18 RX ADMIN — ALBUTEROL SULFATE 2.5 MG: 2.5 SOLUTION RESPIRATORY (INHALATION) at 20:05

## 2023-01-18 RX ADMIN — MICONAZOLE NITRATE ANTIFUNGAL POWDER: 2 POWDER TOPICAL at 09:39

## 2023-01-18 RX ADMIN — ACETAMINOPHEN 650 MG: 650 SOLUTION ORAL at 23:45

## 2023-01-18 RX ADMIN — CHLORHEXIDINE GLUCONATE 0.12% ORAL RINSE 15 ML: 1.2 LIQUID ORAL at 08:27

## 2023-01-18 RX ADMIN — ACETAMINOPHEN 975 MG: 325 TABLET, FILM COATED ORAL at 13:46

## 2023-01-18 RX ADMIN — APIXABAN 5 MG: 2.5 TABLET, FILM COATED ORAL at 08:27

## 2023-01-18 RX ADMIN — LACTULOSE 20 G: 20 SOLUTION ORAL at 08:29

## 2023-01-18 RX ADMIN — NYSTATIN 500000 UNITS: 100000 SUSPENSION ORAL at 21:23

## 2023-01-18 RX ADMIN — Medication 40 MG: at 21:36

## 2023-01-18 RX ADMIN — HEPARIN SODIUM 1900 UNITS: 1000 INJECTION INTRAVENOUS; SUBCUTANEOUS at 17:20

## 2023-01-18 RX ADMIN — ALBUTEROL SULFATE 2.5 MG: 2.5 SOLUTION RESPIRATORY (INHALATION) at 15:00

## 2023-01-18 RX ADMIN — Medication 1 TABLET: at 08:28

## 2023-01-18 RX ADMIN — MIDODRINE HYDROCHLORIDE 5 MG: 5 TABLET ORAL at 16:56

## 2023-01-18 RX ADMIN — NYSTATIN 500000 UNITS: 100000 SUSPENSION ORAL at 12:52

## 2023-01-18 RX ADMIN — RIFAXIMIN 550 MG: 550 TABLET ORAL at 21:24

## 2023-01-18 ASSESSMENT — ACTIVITIES OF DAILY LIVING (ADL)
ADLS_ACUITY_SCORE: 65
ADLS_ACUITY_SCORE: 65
ADLS_ACUITY_SCORE: 61
ADLS_ACUITY_SCORE: 65
ADLS_ACUITY_SCORE: 61
ADLS_ACUITY_SCORE: 65
ADLS_ACUITY_SCORE: 65
ADLS_ACUITY_SCORE: 61

## 2023-01-18 NOTE — SIGNIFICANT EVENT
BG was 65 at 0803, patient was given apple juice and breakfast tray, BG was rechecked to be 80 at 0822. Patient's attending (Dr. Huerta) was informed, Lantus insulin was reduced to 10 units daily

## 2023-01-18 NOTE — PLAN OF CARE
Problem: Artificial Airway  Goal: Effective Communication  Outcome: Progressing  Goal: Optimal Device Function  Outcome: Progressing  Intervention: Optimize Device Care and Function  Recent Flowsheet Documentation  Taken 1/18/2023 1134 by Rocio Giang RT  Airway Safety Measures: all equipment/monitors on and audible  Taken 1/18/2023 0740 by Rocio Giang RT  Airway Safety Measures: all equipment/monitors on and audible  Taken 1/18/2023 0726 by Rocio Giang RT  Airway Safety Measures: all equipment/monitors on and audible  Goal: Absence of Device-Related Skin or Tissue Injury  Outcome: Progressing     Problem: Mechanical Ventilation Invasive  Goal: Optimal Device Function  Intervention: Optimize Device Care and Function  Recent Flowsheet Documentation  Taken 1/18/2023 1134 by Rocio Giang RT  Airway Safety Measures: all equipment/monitors on and audible  Taken 1/18/2023 0740 by Rocio Giang RT  Airway Safety Measures: all equipment/monitors on and audible  Taken 1/18/2023 0726 by Rocio Giang RT  Airway Safety Measures: all equipment/monitors on and audible     Problem: Artificial Airway  Goal: Optimal Device Function  Intervention: Optimize Device Care and Function  Recent Flowsheet Documentation  Taken 1/18/2023 1134 by Rocio Giang RT  Airway Safety Measures: all equipment/monitors on and audible  Taken 1/18/2023 0740 by Rocio Giang RT  Airway Safety Measures: all equipment/monitors on and audible  Taken 1/18/2023 0726 by Rocio Giang RT  Airway Safety Measures: all equipment/monitors on and audible     Problem: Mechanical Ventilation Invasive  Goal: Optimal Device Function  Intervention: Optimize Device Care and Function  Recent Flowsheet Documentation  Taken 1/18/2023 1134 by Rocio Giang RT  Airway Safety Measures: all equipment/monitors on and audible  Taken 1/18/2023 0740 by Rocio Giang RT  Airway Safety Measures: all equipment/monitors on and audible  Taken 1/18/2023 0726  by Rocio Giang, RT  Airway Safety Measures: all equipment/monitors on and audible     Problem: Artificial Airway  Goal: Optimal Device Function  Intervention: Optimize Device Care and Function  Recent Flowsheet Documentation  Taken 1/18/2023 1134 by Rocio Giang, RT  Airway Safety Measures: all equipment/monitors on and audible  Taken 1/18/2023 0740 by Rocio Giang, RT  Airway Safety Measures: all equipment/monitors on and audible  Taken 1/18/2023 0726 by Rocio Giang, RT  Airway Safety Measures: all equipment/monitors on and audible   Goal Outcome Evaluation:

## 2023-01-18 NOTE — PLAN OF CARE
Problem: Chronic Kidney Disease  Goal: Electrolyte Balance  Outcome: Not Progressing     Problem: Pain Acute  Goal: Optimal Pain Control and Function  Outcome: Progressing  Intervention: Develop Pain Management Plan  Recent Flowsheet Documentation  Taken 1/18/2023 0900 by Inna Guzman RN  Pain Management Interventions: diversional activity provided     Problem: Malnutrition  Goal: Improved Nutritional Intake  Outcome: Progressing     Problem: Anemia  Goal: Anemia Symptom Improvement  Outcome: Progressing  Intervention: Monitor and Manage Anemia  Recent Flowsheet Documentation  Taken 1/18/2023 1018 by Inna Guzman RN  Safety Promotion/Fall Prevention:    activity supervised    bed alarm on    fall prevention program maintained    clutter free environment maintained     Problem: Artificial Airway  Goal: Effective Communication  Outcome: Progressing     Problem: Fall Injury Risk  Goal: Absence of Fall and Fall-Related Injury  Outcome: Progressing  Intervention: Promote Injury-Free Environment  Recent Flowsheet Documentation  Taken 1/18/2023 1018 by Inna Guzman RN  Safety Promotion/Fall Prevention:    activity supervised    bed alarm on    fall prevention program maintained    clutter free environment maintained   Goal Outcome Evaluation:    Patient reported a 8/10 headache at the start of shift (could not get anything for pain at that time),-blood pressure was 109/69 at that time (0825), blood pressure earlier was 113/70 at 0756 pain later came down to 4 and finally denied pain. Patient ate 75 % of breakfast, no complaint of nausea so far this shift, hemoglobin was 7.9 on 1/16, phosphorus today is 4.2                         present

## 2023-01-18 NOTE — PLAN OF CARE
Problem: Mechanical Ventilation Invasive  Goal: Effective Communication  Outcome: Progressing  Goal: Optimal Device Function  Outcome: Progressing  Intervention: Optimize Device Care and Function  Recent Flowsheet Documentation  Taken 1/3/2023 1610 by Maria Antonia Horvath RT  Airway Safety Measures: all equipment/monitors on and audible  Taken 1/3/2023 1221 by Maria Antonia Horvath RT  Airway Safety Measures: all equipment/monitors on and audible  Goal: Mechanical Ventilation Liberation  Outcome: Progressing  Goal: Optimal Nutrition Delivery  Outcome: Progressing  Goal: Absence of Device-Related Skin and Tissue Injury  Outcome: Progressing  Goal: Absence of Ventilator-Induced Lung Injury  Outcome: Progressing   RT PROGRESS NOTE   [id number]  DATA:     CURRENT SETTINGS:             TRACH TYPE / SIZE: #7 Bivona  placed on 1/09/23              MODE:   TM 35% 30L/ Cap RA             FIO2:        ACTION:             THERAPIES: Albuterol/ Mucomyst QID / new order Flutter Valve QID                SUCTION: X1 for  mod white normal secr                          FREQUENCY:                           AMOUNT:                           CONSISTENCY:                           COLOR:                SPONTANEOUS COUGH EFFORT/STRENGTH OF EFFORT (not elicited by suctioning):                               WEANING PHASE: 3                         WEAN MODE: TM 35% 30L cont, PMV started at 0835 a.m.,   Cap RA started at 1140a.m.                        WEAN TIME:                           END WEAN REASON:        RESPONSE:             BS:                VITAL SIGNS:                EMOTIONAL NEEDS / CONCERNS:                  RISK FOR SELF DECANNULATION:                          RISK DUE TO:                          INTERVENTION/S IN PLACE IS/ARE:         NOTE / PLAN:   Goal Outcome Evaluation:     Capping was extended to when pt is in bed. TM/PMV at noc. Pt ashley well    Cont to monitor and treat

## 2023-01-18 NOTE — PROGRESS NOTES
RT PROGRESS NOTE     DATA:     CURRENT SETTINGS:             TRACH TYPE / SIZE:  #7 Bivona (placed 1/9)             MODE:   Capped              FIO2:   RA     ACTION:             THERAPIES:   Albuterol/Aerobika QID, Mucomyst BID             SUCTION:                           FREQUENCY:   No Suction Needed                        AMOUNT:   NA                        CONSISTENCY:   NA                        COLOR:   NA             SPONTANEOUS COUGH EFFORT/STRENGTH OF EFFORT (not elicited by suctioning): Weak Spontaneous Cough                           WEANING PHASE:   Phase 3                        WEAN MODE:    Capped RA                        WEAN TIME:   7:40am                        END WEAN REASON:   Still Weaning     RESPONSE:             BS:   Clear/Diminished             VITAL SIGNS:   Sating 97-99%, HR 91-98, RR 22-24             EMOTIONAL NEEDS / CONCERNS:  NA                RISK FOR SELF DECANNULATION:  No     NOTE / PLAN:   Cap as tolerated.  IS instruct done with pt achieving 500ml, needs encouragement.  RT will continue to monitor.

## 2023-01-18 NOTE — PLAN OF CARE
Problem: Fall Injury Risk  Goal: Absence of Fall and Fall-Related Injury  Outcome: Progressing  Intervention: Identify and Manage Contributors  Recent Flowsheet Documentation  Taken 1/18/2023 0200 by Renay Padilla, RN  Medication Review/Management: medications reviewed  Intervention: Promote Injury-Free Environment  Recent Flowsheet Documentation  Taken 1/18/2023 0200 by Renay Padilla, RN  Safety Promotion/Fall Prevention:    bed alarm on    fall prevention program maintained    increased rounding and observation    increase visualization of patient    lighting adjusted    room near nurse's station   Goal Outcome Evaluation:       Pt c/o headache 4/10 at start of the shift, had Tylenol 650 mg at 0035 , did verbalized some relief after, all safety measures in progress, will continue to monitor.

## 2023-01-18 NOTE — PLAN OF CARE
Problem: Artificial Airway  Goal: Effective Communication  Outcome: Progressing  Goal: Optimal Device Function  Outcome: Progressing  Intervention: Optimize Device Care and Function  Recent Flowsheet Documentation  Taken 1/17/2023 2358 by Lyudmila Galvin, RT  Airway Safety Measures: all equipment/monitors on and audible  Taken 1/17/2023 2006 by Lyudmila Galvin, RT  Airway Safety Measures: all equipment/monitors on and audible  Goal: Absence of Device-Related Skin or Tissue Injury  Outcome: Progressing     RT PROGRESS NOTE     DATA:     CURRENT SETTINGS:              TRACH TYPE / SIZE: #7 Bivona, placed on 01/9/22             MODE: TM/PMV             FIO2: 21%/30 L     ACTION:             THERAPIES: Albuterol/Mucomyst QID              SUCTION:                           FREQUENCY: NONE                        AMOUNT:                         CONSISTENCY:                         COLOR:              SPONTANEOUS COUGH EFFORT/STRENGTH OF EFFORT (not elicited by suctioning): FAIR loose nonproductive                            WEANING PHASE: 2/3                        WEAN MODE: capped on RA                         WEAN TIME: 12 hours and 20 min                        END WEAN REASON: end of wean trial.      RESPONSE:             BS: coarse              VITAL SIGNS: BP 90/53 (BP Location: Left arm)   Pulse 104   Temp 97.4  F (36.3  C) (Oral)   Resp 18   Wt 83.6 kg (184 lb 4.8 oz)   SpO2 96%   BMI 25.00 kg/m                EMOTIONAL NEEDS / CONCERNS:                  RISK FOR SELF DECANNULATION:                          RISK DUE TO:                          INTERVENTION/S IN PLACE IS/ARE:         NOTE / PLAN: Patient is on TM/PMV for night, tolerating it well. Consider capping continuously as tolerated. Neb txs given as order. Will cont to monitor.

## 2023-01-18 NOTE — PROGRESS NOTES
Pulmonary Progress Note    Admit Date: 12/29/2022  CODE: Full Code    Assessment/Plan:   57yoM with liver transplant(2016), recent prolonged hospitalization 11/12-12/15 for PEA cardiac arrest, Strep/Parainfluenza pneumonia with ARDS, MODS, ?aspergillus pna s/p 1 week of voriconazole, right PTX requiring chest tube since removed.  Now HD dependent, s/p trach/PEG.  Course further c/b new left PTX requiring chest tube(since removed), b/l pleural effusions, ascites, mucus plugging, PEA arrest and seizure on 12/20, pleural fluid cxs growing candida parapsilosis & ascites cx growing lactobacillus on antimicrobials, and CSF panel positive for HHV6 ultimately decided to not treat.  Admitted to [facility] 12/29.  Completed course of Unasyn transitioned to Augmentin for lactobacillus peritonitis, ended 1/11.      Discussion of pertinent problems:  Acute hypoxic/hypercapnic respiratory failure s/p trach: Liberated from the vent on 1/9 with acceptable VBG on trach dome.  Continues to slowly improve now tolerating trach cap trials during the day and speaking valve thru the night.         Hx bilateral pleural effusions s/p left thora 11/26.  S/p right thora 1/13 - only 50cc removed d/t complex fluid; exudative; pleural LDH very high, very low glucose, pH 7.50, high cell count.  This would qualify as a complicated parapneumonic effusion.  Clinically stable on trach dome, no fevers or leukocytosis, procal downtrending.  Cytology neg for malignancy; shows acute inflammation, cultures with NGTD thus far.  Managing conservatively for now       Tracheostomy in place: 8 Shiley placed 11/29.  Changed to 6 Shiley prox xlt 12/8, unclear why.  Downsized to 7 Bivona 1/9 without issue.      Dysphagia s/p PEG tube.  BDT 12/30 with delayed aspiration.  Tolerating PMV trials well after trach downsize.  Repeat BDT 1/10 with no immediate and faint delayed aspiration.  Aspiration on VFSS 1/13 ->started soft/bite sized w/ moderately thick liquids      Candida in left pleural fluid cultures: started on Micafungin switched to fluconazole 1/3 based on cx sensitivities.  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  May need TIPS in the future     CHRISTIAN on home BiPAP.  Managed currently with trach and vent     Seizure after hypoxic event on 12/20 with subsequent seizure 12/21 during dialysis:   - Keppra and vimpat indefinitely     Multifocal acute ischemic strokes on MRI(12/20).      Hx b/l pneumothoraces requiring b/l chest tubes, both since removed.  Stable on recent imaging     Hx mucus plugging of bronchi: stable on current regimen    Acute on chronic Anemia: s/p multiple blood transfusions, last on 1/13.  Apixaban on hold since 1/4.  Initially with bloody secretions which have since resolved.  Hgb stable.  Apixaban restarted 1/17    Plan:   - Phase 3 weans: start capping 24/7  - follow pleural cultures - NGTD thus far on cultures  - CT chest abdomen and pelvis to follow-up pleural effusions and ascites per ID(pending)  - Monitor closely for s/s infection and/or clinical decline as may need placement of right chest tube with lytics, but as of now appears to be improving overall    - Bronchial hygiene with Albuterol + flutter valve QID  + mucomyst nebs BID + IS  - Fluconazole at least until 1/23: 4 week course for candida in pleural fluid     Henry Fenton, SHAWN  Pulmonary Medicine  [facility]  Pager [phone]    Subjective/Interim Events:   - denies sob, n/v, fever, chills.  No new pains.  Tired   - no overnight events  - tolerated capping all day yesterday, no suctioning required overnight     Tracheal secretions:  Overnight - none  Yesterday - x1, mod, thin    Cough strength: weak/moderate    Current phase of ventilator weaning  pathway:  Phase 2    Ventilator weaning results: continuous TM since 1/10    1/16: TM/PMV continuous, capped 1hr 25min   1/17: capped for 12/5 hr, TM/PMV night     Clinical status discussed today with respiratory therapist, patient, wife    Medications:       dextrose       - MEDICATION INSTRUCTIONS -         sodium chloride 0.9%  300 mL Hemodialysis Machine During Dialysis/CRRT (from stock)     acetaminophen  975 mg Per Feeding Tube Q8H BIJU    Or     acetaminophen  650 mg Rectal Q8H BIJU     acetylcysteine  2 mL Nebulization BID     albuterol  2.5 mg Nebulization 4x daily     apixaban ANTICOAGULANT  5 mg Oral or Feeding Tube BID     carboxymethylcellulose PF  1 drop Both Eyes TID     chlorhexidine  15 mL Mouth/Throat BID     epoetin mirta-epbx  40,000 Units Subcutaneous Weekly     fluconazole  200 mg Oral or Feeding Tube At Bedtime     folic acid  1 mg Oral or Feeding Tube Daily     heparin Lock (1000 units/mL High concentration)  1,900 Units Intracatheter During Dialysis/CRRT (from stock)     heparin Lock (1000 units/mL High concentration)  1,900 Units Intracatheter During Dialysis/CRRT (from stock)     insulin aspart  1-6 Units Subcutaneous Daily     insulin glargine  10 Units Subcutaneous Daily     lacosamide  100 mg Oral or Feeding Tube BID     lactulose  20 g Oral or Feeding Tube BID     levETIRAcetam  1,000 mg Oral or Feeding Tube Q24H     Melatonin  6 mg Oral or NG Tube At Bedtime     midodrine  10 mg Oral During Dialysis/CRRT (from stock)     midodrine  5 mg Oral or Feeding Tube TID w/meals     mineral oil-hydrophilic petrolatum   Topical Daily     multivitamin RENAL  1 tablet Per Feeding Tube Daily     nystatin  500,000 Units Swish & Swallow 4x Daily     pantoprazole  40 mg Per Feeding Tube BID     QUEtiapine  75 mg Oral or Feeding Tube At Bedtime     rifaximin  550 mg Oral or Feeding Tube BID     sodium chloride (PF)  10-30 mL Intracatheter Q8H     tacrolimus  1 mg Oral or Feeding Tube BID          Exam/Data:   Vitals  /69 (BP Location: Left arm, Patient Position: Semi-Salgado's)   Pulse 97   Temp 97.6  F (36.4  C) (Oral)   Resp 20   Wt 83.3 kg (183 lb 9.6 oz)   SpO2 97%   BMI 24.90 kg/m       I/O last 3 completed shifts:  In: 1120 [P.O.:720; NG/GT:400]  Out: 200 [Stool:200]  Weight change:     Vent Mode: Trach collar  FiO2 (%): 21 %  Resp: 20      EXAM:  Gen: no distress lying in bed, fatigued    HEENT: NT, trach midline/intact, no stridor, weak voice   CV: RRR   Resp: CTAB anteriorly/posteriorly, diminished b/l lower lobes; non-labored   Abd: soft, non tender, BS hypoactive  Skin: no visible rashes or lesions, scattered ecchymosis(improved), fragile   Ext: no edema, weak  Neuro: alert, follows commands    ROS:  A 10-system review was obtained and is negative with the exception of the symptoms noted above.    Labs:  Basic Metabolic Panel  Recent Labs   Lab 01/18/23  0822 01/18/23  0803 01/17/23  1903 01/17/23  0952 01/17/23  0555 01/16/23  0635 01/14/23  0759 01/14/23  0615 01/14/23  0550 01/13/23  1753 01/12/23  0625 01/12/23  0621   NA  --   --   --  136  --  133*  --  134*  --  136   < > 136   POTASSIUM  --   --   --  3.6  --  4.0  --  4.1  --  3.7   < > 3.5   CHLORIDE  --   --   --   --   --  94*  --  97*  --   --   --  99   CO2  --   --   --   --   --  27  --  28  --   --   --  27   BUN  --   --   --   --   --  54.7*  --  34.1*  --   --   --  45.6*   CR  --   --   --   --   --  3.18*  --  1.94*  --   --   --  2.08*   GLC 80 65* 101* 104   < > 96   < > 78   < > 112   < > 152*    < > = values in this interval not displayed.     CBC RESULTS: Recent Labs   Lab Test 01/09/23  0656 01/05/23  1442 01/04/23  0623 12/27/22  0420   WBC  --   --   --  7.5   RBC  --   --   --  2.76*   HGB 7.3*   < > 6.3* 8.8*   HCT  --   --   --  29.4*   MCV  --   --   --  107*   MCH  --   --   --  31.9   MCHC  --   --   --  29.9*   RDW  --   --   --  18.7*   PLT  --   --  58* 63*    < > = values in this interval  not displayed.      Venous Blood Gas  Recent Labs   Lab 01/17/23  0952 01/13/23  1753 01/12/23  2245 01/12/23  0442   PHV 7.39 7.41 7.41 7.40   PCO2V 50 52* 47 49   PO2V 34 29 33 34   HCO3V 28 30 28 29   MITA 5.0* 7.7* 5.5* 5.5*        RADIOLOGY: Personally reviewed; radiology read below   CTA CHEST ABDOMEN PELVIS W CONTRAST 1/6/2023                                                                   IMPRESSION:  1. No obvious active bleeding identified.  2. Large amount of abdominal/pelvic ascites, splenomegaly, and scattered varicosities. The above findings would be most typical for sequelae of portal venous hypertension.  3. Significant stranding of the omental fat and intraperitoneal fat, this is nonspecific, but may be reactive or neoplastic in nature.  4. Percutaneous gastrostomy tube, tracheostomy, and the rectal tube appear to be in good position.  5. Central catheter with tip in the inferior right atrium.  6. Mild to moderate right pleural effusion and mild left pleural effusion, both of these appear to be loculated given the peripheral enhancement. Presumed sequelae of prior hematoma seen in the right lung.  7. Minute amount of gas seen associated with the left pleural effusion and this is presumed to be iatrogenic in nature.  8. Slight filling defect within the lower trachea most typical for slight mucous.  9. Enlargement of the main pulmonary artery presumed to represent sequelae of pulmonary arterial hypertension.   ----------------  CTA NECK WITH CONTRAST 1/6/2023     INDICATION: Tracheostomy in place, worsening bloody tracheal secretions, rule out tracheoinnominate fistula.                                                                   IMPRESSION:   1.  No significant stenosis or dissection.  2.  No definite findings to suggest a tracheoinnominate artery fistula.  ----------------------  XR CHEST  1/4/2023                                                                   IMPRESSION:      Right PICC  terminates in the middle third of the SVC. Tunneled right jugular approach dialysis catheter terminates in the right atrium. Tracheostomy resides within the tracheal air column.     Persistent shallow lung inflation. Territories of bibasilar atelectasis are similar. Small right pleural effusion visible in the right lateral sulcus and layering into the interlobar fissures is similar. No enlarging pleural effusion. No pneumothorax.     Unchanged heart and mediastinal contours. Unchanged oblique interface just lateral to the dialysis catheter/SVC and corresponds to rounded atelectasis on prior CT.

## 2023-01-18 NOTE — PROGRESS NOTES
Pt refused to eat dinner , he was approached x3 to eat, he refused, blood sugar checked around 1903 read 101, tube feeding bolus started, report given to on coming nurse.Pt calm and sleeping.

## 2023-01-18 NOTE — PROGRESS NOTES
RENAL PROGRESS NOTE    CC:  LORETTA likely progressed to ESRD    57 YOM EtOH cirrhosis , SP liver tx 2016, on IHD for LORETTA likely progressed to ESRD since 11/29/2022, recent prolonged hosp for PEA arrest 11/15/2022 to 12/15/2022 , cb influenza and bact PNA, sp Trach and PEG and had a second PEA arrest 12/20 with seizures , cb pleural effusions (fungal infection), and recurrent ascites , CSF + for HHV 6, now at [facility] , nephrology following for ESRD management.    ASSESSMENT & PLAN:     Anuric LORETTA likely ESRD: now requiring dialysis after PEA arrest, was on CRRT which was discontinued 11/26/2022 and started on intermittent HD ongoing since 11/29/2022.  He is maintained on a MWF schedule.  No signs of renal recovery.  Attempted diuretic challenge and bladder scans->remains anuric and torsemide was discontinued 1/2. Aim to keep net negative.  Target UF 2-3kg as BP allows. Midodrine to support blood pressure and UF. MWF HD at [facility]     Anemia of chronic renal failure.   Hematochezia   Left hemothorax.    On NORA 40K weekly, continue. Previously low iron, holding off replacement given infectious concerns as below. Transfusing per hospitalist service      Chronic hypotension: Likely 2/2 chronic liver disease, improving.  on midodrine to 5mg TID with parameters to hold for SBP>110.  may need to increase if hemodynamics do not support UF. Will hold off albumin for now     Hypokalemia: Needing intermittent replacement.  He is higher K bath with HD.     Acute on chronic hypoxic respiratory failure: S/p tracheostomy 11/29/2022.  Complicated by left hemopneumothorax s/p left chest tube.  History of aspergillus PNA and multiple bacterial PNA. On micafungin.  Mgmt per pulm      CARRILLO cirrhosis s/p liver transplant: Immunosuppression per GI.  Tacrolimus and prednisone.  Paracentesis as indicated per GI, planned for 1/13/2023. On Abx for lactobacillus peritonitis.      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   Initial seizure after PEA arrest, then another seizure again 12/21/2022 during dialysis session.  Initially on acyclovir and then switched over to ganciclovir, since been discontinued.  On Keppra and Vimpat.  Management per neurology and ID       SUBJECTIVE:    Per HD RN, lines reversed  And working well last 2 treatments, line not sluggish with good inflow/outflow with lavage.   Discussed MWF HD schedule, hg low continue NORA therapy.   Labs reviewed, mild hyponatremia, monitor with HD  Pt denies pain, n, v, c, d, sob, fever, rash or CP  Sleep and appetite stable  Wt is down trending  Pt reports some cramping with HD, Discussed with HD RN today that it is okay to decrease UD today and see if cramping improves   Answered all questions.       OBJECTIVE:  Physical Exam   Temp: 97.6  F (36.4  C) Temp src: Oral BP: 109/69 Pulse: 97   Resp: 20 SpO2: 97 % O2 Device: None (Room air) Oxygen Delivery: 30 LPM  Vitals:    01/15/23 0143 01/16/23 0325 01/18/23 0411   Weight: 84.4 kg (186 lb) 83.6 kg (184 lb 4.8 oz) 83.3 kg (183 lb 9.6 oz)     Vital Signs with Ranges  Temp:  [97.4  F (36.3  C)-97.9  F (36.6  C)] 97.6  F (36.4  C)  Pulse:  [] 97  Resp:  [18-27] 20  BP: ()/(53-70) 109/69  FiO2 (%):  [21 %] 21 %  SpO2:  [91 %-100 %] 97 %  I/O last 3 completed shifts:  In: 1120 [P.O.:720; NG/GT:400]  Out: 200 [Stool:200]    Patient Vitals for the past 72 hrs:   Weight   01/18/23 0411 83.3 kg (183 lb 9.6 oz)   01/16/23 0325 83.6 kg (184 lb 4.8 oz)       Intake/Output Summary (Last 24 hours) at 1/16/2023 0918  Last data filed at 1/16/2023 0648  Gross per 24 hour   Intake 640 ml   Output 700 ml   Net -60 ml       PHYSICAL EXAM:  GEN: NAD, thin/frail  CV: RRR   Lung: clear and equal  Ab: soft and NT; distended; normal bs  Ext: no edema and well perfused  Skin: no rash  Access: C/d/I      LABORATORY STUDIES:     Recent Labs   Lab 01/17/23  0952 01/16/23  0635 01/14/23  0615 01/13/23  1753 01/13/23  1750 01/12/23  2701  01/12/23 0621   WBC  --   --  6.2  --  7.0  --  4.7   RBC  --   --  2.56*  --  2.94*  --  2.17*   HGB 8.1* 7.9* 7.7* 9.0* 8.6* 7.8* 6.6*   HCT  --   --  25.3*  --  29.1*  --  22.3*   PLT  --   --  214  --  253  --  167       Basic Metabolic Panel:  Recent Labs   Lab 01/18/23  0822 01/18/23  0803 01/17/23  1903 01/17/23  0952 01/17/23  0555 01/16/23  0635 01/14/23  0759 01/14/23  0615 01/14/23  0550 01/13/23  1753 01/13/23  0548 01/12/23  2245 01/12/23 0625 01/12/23 0621   NA  --   --   --  136  --  133*  --  134*  --  136  --  137  --  136   POTASSIUM  --   --   --  3.6  --  4.0  --  4.1  --  3.7  --  3.5  --  3.5   CHLORIDE  --   --   --   --   --  94*  --  97*  --   --   --   --   --  99   CO2  --   --   --   --   --  27  --  28  --   --   --   --   --  27   BUN  --   --   --   --   --  54.7*  --  34.1*  --   --   --   --   --  45.6*   CR  --   --   --   --   --  3.18*  --  1.94*  --   --   --   --   --  2.08*   GLC 80 65* 101* 104 92 96   < > 78   < > 112   < > 118   < > 152*   KELLY  --   --   --   --   --  9.4  --  8.9  --   --   --   --   --  8.9    < > = values in this interval not displayed.       INRNo lab results found in last 7 days.     Recent Labs   Lab Test 01/17/23  0952 01/16/23  0635 01/14/23  0615 01/13/23  1753 01/13/23  1750 12/22/22  1108 12/21/22  1315 12/17/22  0519 12/16/22  2239   INR  --   --   --   --   --   --  1.36*  --  1.14   WBC  --   --  6.2  --  7.0   < > 3.4*   < > 3.9*   HGB 8.1* 7.9* 7.7*   < > 8.6*   < > 8.0*   < > 7.9*  7.9*   PLT  --   --  214  --  253   < > 75*   < > 94*    < > = values in this interval not displayed.       Personally reviewed current labs    Jie BABB-BC  [facility]  [phone]

## 2023-01-18 NOTE — PROGRESS NOTES
[facility]    Medicine Progress Note - Hospitalist Service    Date of Admission:  12/29/2022    Brief history:  Blanco Osborne is an 57 year old male who is transferred from [facility] f to [facility] for  IV antibiotic treatment.  Patient has multiple medical problems including history of liver transplant 2016, PAF on chronic anticoagulation, history of DM2, CVA, HTN, CHRISTIAN on BiPAP, and history of alcohol abuse in the past causing liver damage ending with liver transplant.  About 6 weeks ago he had respiratory arrest and was diagnosed with infection with parainfluenza and strep pneumoniae and acute on chronic renal insufficiency ending with CRF needing 3/week hemodialysis.  During this period he was diagnosed with cirrhosis of transplanted liver and hyperammonia.  Currently he is on Lactulose and Rifaximin.        On 12/14/22, due to CIP, he was transferred to [facility] for the first time with Trach, PEG and Rt chest tube.    On 12/16/2022 patient was transferred back to [facility] due to respiratory insufficiency secondary to hydropneumothorax and drainage of 900 cc bloody pleural effusion, bloody stool and transfusion of 2 units PRBC.    On 12/20/2022 he had another episode of PEA arrest followed by CPR x2 minutes and possibly had seizure activity in [facility]. 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  He has been on ventilator and has improved to trach dome tolerating up to 6 hours so far. He had chest tube which was removed.    He had SBP with growth of lactobacillus and is currently on Unasyn which will be switched to Augmentin orally through 1/12/2023.    Pleural effusion grew Candida and is on micafungin.  He is also on  due to cirrhosis and hyperammonia.  He is anemic and is on Retacrit.   He is on lacosamide and Keppra due to history of seizure.  There is question of him drinking again  causing him cirrhosis of the transplanted liver.    LTAC course:  1/10/2023-1/15/2023.  Dialyzing. 1/12,Overnight patient had to be resuscitated with 1 L normal saline bolus after BPA sepsis fired noted to have lactic acid of 2.3. Following morning Hb is 6.6.  Transfused 1 unit of packed red blood cells.1/13,Patient had paracentesis and thoracentesis about 7500 cc of ascitic fluid and 50 cc of pleural fluid yielded. Cytology pending.1/15. Just about the same compared to yesterday. Hb fairly stable. No new changes at this time, continue with current medical management    1/16-1/18: Hgb 7.9, stable.  Apixaban resumed 1/17 since no further tracheal bleeding.  Right thoracentesis 1/13 fluid considered exudative, likely parapneumonic complicated effusion per Pulmonology.  Clinically, no fevers, normal WBC, right pleural fluid cultures NGTD.  ID consulted for antibiotic recommendations.  CT chest/abdomen/pelvis ordered 1/18, results pending.  Lantus decreased to 10 units from 20 given relatively low glucose in the last few days and patient refusing to eat meals at times.  Abdomen remains soft following large-volume 7.5 L paracentesis 1/13/2023.     Assessment & Plan   Principal Problem:    Acute on chronic respiratory failure with hypoxia (H)  Active Problems:    Acquired immunocompromised state (H)    Cirrhosis of liver (H)    NAFLD (nonalcoholic fatty liver disease)    Liver transplanted (H)    Immunosuppression (H)    Critical illness myopathy    Seizures (H)    History of sudden cardiac arrest, PEA    ESRD (end stage renal disease) on dialysis (H)    Anemia of chronic disease due to ESRD, cirrhosis and hypersplenism      Acute now chronic respiratory failure requiring tracheostomy.  Initial event was parainfluenza and strep pneumo pneumonia.  Had failed extubation x2 and tracheostomy performed last hospitalization.  Had additional complications of bilateral pneumothoraces.  (Most recently was a hydropneumothorax where  fluid grew Candida.. Chest tube was placed and removed . Had decent tolerance for pressure support and trach dome but after few hours and trach dome 12/27 had a pretty significant episode of desaturation.   -Wean ventilator per Pulmonology.  -Tracheostomy care per RT    Pneumonia  ARDS  Right pneumothorax  Bilateral pleural effusions  Treated for ARDS pneumonia, Aspergillus pneumonia during prior hospitalization and right chest tube now removed.  Left pleural fluid positive for Candida parapsilosis treated with IV micafungin and currently on fluconazole, to finish 4-week course.  Concern for right complicated parapneumonic effusion. S/p thoracentesis 11/26 and 1/13.  Right thoracentesis 1/13 consistent with exudative effusion with elevated LDH, high neutrophils, cultures show NGTD.    -Pulmonology considering intrapleural lytics or surgical intervention if any further deterioration, transfer to higher level of care  -Continue fluconazole to finish course for left pleural fluid Candida parapsilosis   -ID consulted 1/17 for further antibiotic recommendations  -CT chest/abdomen/pelvis ordered 1/18, results pending    History PEA  Hypotension  PEA arrest prior to his previous admission and 1 episode of PEA associated with desaturation on 12/20.  No structural cardiac disease but does have A. Fib which neurologist thought might have been contributory to his embolic strokes.  Anticoagulation been restarted with stable labs although high risk for bleeding seconday to thrombocytopenia.  Also has developed baseline hypotension and is on midodrine 10 mg 3 times daily.  -Continue current midodrine dose      Infectious disease  At prior hospital:  1. 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 4.  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  Have had some conversations within our group and with transplant ID and general infectious disease.  General consensus is that ganciclovir probably could be discontinued  2. H/o Strep/Parainfluenza infection last  hospitalization. Completed treatment course  3. 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  4.  Lactobacillus growing in the broth 4 days after paracentesis.  Cell count was very low.  5.  Candida in left pleural fluid cultures.  Sensitivities not resulted.  6.  Immunosuppressed on tacrolimus.  Discussed with transplant service at the [facility] who felt that given the absence of rejection on his most recent biopsy and ongoing issues with infection continuing with 1 twice daily tacrolimus is the best current option.  They have seen his subtherapeutic trough levels.  -Received Unasyn for 2 weeks total.  Changed to Augmentin, finished course on 1/12/2023.  -At least 4 weeks of antimicrobial for the Candida.   -Continue tacrolimus as above and tapering steroids  -Remains off ganciclovir    S/p Liver transplant 2016  Cirrhosis with ascites  Subacute bacterial peritonitis  Main manifestations of this are hepatic encephalopathy and ascites.  Hepatologist at [facility] felt that most likely reason for the cirrhosis was metabolic syndrome or alcohol intake.  There was no evidence of rejection on biopsy and there was some evidence of regeneration. Paracentesis done on 12/22/2022 showed lactobacillus indicating SBP, treated with Unasyn and Augmentin, finished 2-week course 1/12/2023.  Requires large-volume paracentesis periodically for recurring ascites.  -Paracentesis 1/13/2023 yielded about 7500 cc.  Cytology studies pending.  Ascites fluid cultures NGTD.    -Continue intermittent paracentesis as needed if develops symptomatic ascites.    -Further treatment for recurrent ascites with TIPS defer to his Liver Transplant team and Hepatology follow-up appointment  -He has an appointment on 4/21/2023 with Hepatology, Dr. Simms  -Tacrolimus 1 mg BID and tapering steroids   -Lactulose and Rifaximin as above    Seizure after hypoxic event.  This is in the context of baseline multifactorial encephalopathy secondary to his  ongoing critical illness and prior cardiac arrests and prior strokes and cirrhosis with hepatic encephalopathy. MRI of head of 12/20/22 reveals no PRES or leptomeningeal enhancement but scattered.  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   - Continue with  Keppra  indefinitely.  Neurology follow-up once discharge from LTACH.    Paroxysmal atrial fibrillation  Remains in sinus rhythm.  On anticoagulation with apixaban indefinitely for stroke prophylaxis.  Anticoagulation held since 1/6 due to tracheal bleeding noted during suctioning.  Apixaban resumed 1/17 since no further tracheal bleeding  -Continue anticoagulation indefinitely for secondary stroke prevention with apixaban      ESRD: Now on iHD.  No return of renal function.  - F schedule     Acute anemia  Status post 1 unit PRBCs transfusion 1/12/2023  -Repeat Hb 7.8, 1/12/2023 and remains stable  -No evidence of overt bleeding.  -Anemia most likely secondary to critical illness/chronic disease, chronic renal disease  -Type screen and transfuse packed red blood cells to keep Hb> 7  -Monitor H&H.    Diabetes mellitus type 2  Titrating insulin. He needs supplemental coverage.  2. He is on steroids which are weaning  Plan:  -Continue with current regimen.   -Lantus decreased to 10 units from 20 given relatively low glucose on 1/17 and patient sometimes refusing to eat meals leading to subsequent hypoglycemia  -Hypoglycemic protocol in place    Moderate malnutrition, protein and calorie type.  -Continue with tube feeds via PEG tube  -Nutritionist consulted and following     Pancytopenia due cirrhosis, ESRD and hypersplenism. WBC count has improved.  However he needs frequent PRBC transfusion due Hb <7.0.  Patient is on anticoagulation for PAF.  Currently in sinus rhythm.    -Monitor        Diet: Adult Formula Bolus Feeding: Novasource Renal; Route: Gastrostomy; 3 times daily; Volume per Bolus: 250; mL(s);  Additional free water (mL): 100 ml b/4 and after each bolus feeding if given; 125 ml/h x 2 h back up bolus feeding after each meal ONLY...  Soft & Bite Sized Diet (level 6) Liquidized/Moderately Thick (level 3)  Snacks/Supplements Adult: Gelatein Plus; Between Meals  Snacks/Supplements Adult: Ensure Enlive; Between Meals    DVT Prophylaxis: DOAC  Nixon Catheter: Not present  Central Lines: PRESENT      Cardiac Monitoring: None  Code Status: Full Code      Disposition Plan     Expected Discharge Date: 01/30/2023    Discharge Delays: Complex Discharge    Discharge Comments: Vent. Trach. Phase 1. PEG.  Rectal Tube.      The patient's care was discussed with the Bedside Nurse, Patient and Patient's Family.    SPARKLE CRUZ MD  Hospitalist Service  LTACH  Securely message with the Vocera Web Console (learn more here)  Text page via [employer] Paging/Directory     Clinically Significant Risk Factors          # Hypocalcemia: Lowest iCa = 1.23 mg/dL in last 2 days, will monitor and replace as appropriate     # Hypoalbuminemia: Lowest albumin = 1.8 g/dL at 12/29/2022  4:40 AM, will monitor as appropriate             # Severe Malnutrition: based on nutrition assessment      _____________________________________________________________________    Interval History   No new events overnight per RN  Glucose 65 this morning, patient refused to eat dinner last night  Resting in bed receiving.    Wife at bedside updated.   Awaiting CT chest/abdomen/pelvis today    Data reviewed today: I reviewed all medications, new labs and imaging results over the last 24 hours. I personally reviewed.    Physical Exam   Vital Signs: Temp: 97.6  F (36.4  C) Temp src: Oral BP: 109/69 Pulse: 97   Resp: 20 SpO2: 97 % O2 Device: None (Room air) Oxygen Delivery: 30 LPM  Weight: 183 lbs 9.6 oz   General: Alert, oriented.  Frail and debilitated.  HEENT: Normal conjunctiva, anicteric.  Neck: Supple.  Trach noted  Lungs: Clear to auscultation  Heart: S1, S2.  No  murmurs  Abdomen: Soft, minimally distended, ascites noted, bowel sounds noted  Extremities: No peripheral edema.  PICC line in place.  Skin: Ecchymotic spots around neck.  No rash or ulcers.    Data   Recent Labs   Lab 01/18/23  0822 01/18/23  0803 01/17/23  1903 01/17/23  0952 01/17/23  0555 01/16/23  0635 01/14/23  0759 01/14/23  0615 01/13/23  1753 01/13/23  1750 01/13/23  0553 01/12/23  0625 01/12/23  0621   WBC  --   --   --   --   --   --   --  6.2  --  7.0  --   --  4.7   HGB  --   --   --  8.1*  --  7.9*  --  7.7*   < > 8.6*  --    < > 6.6*   MCV  --   --   --   --   --   --   --  99  --  99  --   --  103*   PLT  --   --   --   --   --   --   --  214  --  253  --   --  167   NA  --   --   --  136  --  133*  --  134*   < >  --   --    < > 136   POTASSIUM  --   --   --  3.6  --  4.0  --  4.1   < >  --   --    < > 3.5   CHLORIDE  --   --   --   --   --  94*  --  97*  --   --   --   --  99   CO2  --   --   --   --   --  27  --  28  --   --   --   --  27   BUN  --   --   --   --   --  54.7*  --  34.1*  --   --   --   --  45.6*   CR  --   --   --   --   --  3.18*  --  1.94*  --   --   --   --  2.08*   ANIONGAP  --   --   --   --   --  12  --  9  --   --   --   --  10   KELLY  --   --   --   --   --  9.4  --  8.9  --   --   --   --  8.9   GLC 80 65* 101* 104   < > 96   < > 78   < >  --   --    < > 152*   ALBUMIN  --   --   --   --   --   --   --  1.9*  --   --   --   --  2.1*   PROTTOTAL  --   --   --   --   --   --   --  6.4  --   --  6.5  --  6.4   BILITOTAL  --   --   --   --   --   --   --  0.7  --   --   --   --  0.4   ALKPHOS  --   --   --   --   --   --   --  250*  --   --   --   --  368*   ALT  --   --   --   --   --   --   --  11  --   --   --   --  11   AST  --   --   --   --   --   --   --  23  --   --   --   --  19    < > = values in this interval not displayed.     Medications     dextrose       - MEDICATION INSTRUCTIONS -         sodium chloride 0.9%  300 mL Hemodialysis Machine During Dialysis/CRRT  (from stock)     acetaminophen  975 mg Per Feeding Tube Q8H BIJU    Or     acetaminophen  650 mg Rectal Q8H BIJU     acetylcysteine  2 mL Nebulization 4x daily     albuterol  2.5 mg Nebulization 4x daily     apixaban ANTICOAGULANT  5 mg Oral or Feeding Tube BID     carboxymethylcellulose PF  1 drop Both Eyes TID     chlorhexidine  15 mL Mouth/Throat BID     epoetin mirta-epbx  40,000 Units Subcutaneous Weekly     fluconazole  200 mg Oral or Feeding Tube At Bedtime     folic acid  1 mg Oral or Feeding Tube Daily     heparin Lock (1000 units/mL High concentration)  1,900 Units Intracatheter During Dialysis/CRRT (from stock)     heparin Lock (1000 units/mL High concentration)  1,900 Units Intracatheter During Dialysis/CRRT (from stock)     insulin aspart  1-6 Units Subcutaneous Daily     insulin glargine  10 Units Subcutaneous Daily     lacosamide  100 mg Oral or Feeding Tube BID     lactulose  20 g Oral or Feeding Tube BID     levETIRAcetam  1,000 mg Oral or Feeding Tube Q24H     Melatonin  6 mg Oral or NG Tube At Bedtime     midodrine  10 mg Oral During Dialysis/CRRT (from stock)     midodrine  5 mg Oral or Feeding Tube TID w/meals     mineral oil-hydrophilic petrolatum   Topical Daily     multivitamin RENAL  1 tablet Per Feeding Tube Daily     nystatin  500,000 Units Swish & Swallow 4x Daily     pantoprazole  40 mg Per Feeding Tube BID     QUEtiapine  75 mg Oral or Feeding Tube At Bedtime     rifaximin  550 mg Oral or Feeding Tube BID     sodium chloride (PF)  10-30 mL Intracatheter Q8H     tacrolimus  1 mg Oral or Feeding Tube BID

## 2023-01-18 NOTE — PROGRESS NOTES
Pulmonary update:    Reviewed case and images with Dr. Meneses and Dr. Monge from Pulmonology.  Concern for empyema based on labs and imaging.    - Recommend chest tube placement to right side +/- lytic administration.  This is not emergent as patient is stable.  Will hold apixaban in preparation.   - Unclear if lytic administration can be performed at [place].  Will get some clarification on this hopefully by tomorrow   - defer to ID for now on addition of antibiotic coverage      Henry Fenton CNP   Pulmonary Medicine  [facility]  Pager [phone]  Office [phone]

## 2023-01-19 ENCOUNTER — APPOINTMENT (OUTPATIENT)
Dept: SPEECH THERAPY | Facility: CLINIC | Age: 58
DRG: 207 | End: 2023-01-19
Attending: HOSPITALIST
Payer: COMMERCIAL

## 2023-01-19 ENCOUNTER — APPOINTMENT (OUTPATIENT)
Dept: PHYSICAL THERAPY | Facility: CLINIC | Age: 58
DRG: 207 | End: 2023-01-19
Attending: HOSPITALIST
Payer: COMMERCIAL

## 2023-01-19 LAB
ABO/RH(D): NORMAL
ALBUMIN SERPL BCG-MCNC: 1.9 G/DL (ref 3.5–5.2)
ALBUMIN SERPL BCG-MCNC: 2 G/DL (ref 3.5–5.2)
ALP SERPL-CCNC: 236 U/L (ref 40–129)
ALT SERPL W P-5'-P-CCNC: 8 U/L (ref 10–50)
AMMONIA PLAS-SCNC: 50 UMOL/L (ref 16–60)
ANION GAP SERPL CALCULATED.3IONS-SCNC: 10 MMOL/L (ref 7–15)
ANTIBODY SCREEN: NEGATIVE
AST SERPL W P-5'-P-CCNC: 20 U/L (ref 10–50)
BASE EXCESS BLDV CALC-SCNC: 7 MMOL/L (ref -8.1–1.9)
BASOPHILS # BLD AUTO: 0.1 10E3/UL (ref 0–0.2)
BASOPHILS NFR BLD AUTO: 1 %
BILIRUB SERPL-MCNC: 0.5 MG/DL
BLD PROD TYP BPU: NORMAL
BLOOD COMPONENT TYPE: NORMAL
BUN SERPL-MCNC: 26.3 MG/DL (ref 6–20)
CA-I BLD-MCNC: 1.19 MMOL/L (ref 1.11–1.3)
CALCIUM SERPL-MCNC: 8.9 MG/DL (ref 8.6–10)
CHLORIDE SERPL-SCNC: 96 MMOL/L (ref 98–107)
CODING SYSTEM: NORMAL
CREAT SERPL-MCNC: 2.21 MG/DL (ref 0.67–1.17)
CROSSMATCH: NORMAL
CRP SERPL-MCNC: 133.9 MG/L
DEPRECATED HCO3 PLAS-SCNC: 28 MMOL/L (ref 22–29)
EOSINOPHIL # BLD AUTO: 0.1 10E3/UL (ref 0–0.7)
EOSINOPHIL NFR BLD AUTO: 2 %
ERYTHROCYTE [DISTWIDTH] IN BLOOD BY AUTOMATED COUNT: 18.8 % (ref 10–15)
GFR SERPL CREATININE-BSD FRML MDRD: 34 ML/MIN/1.73M2
GLUCOSE BLD-MCNC: 71 MG/DL (ref 70–125)
GLUCOSE BLDC GLUCOMTR-MCNC: 121 MG/DL (ref 70–99)
GLUCOSE BLDC GLUCOMTR-MCNC: 73 MG/DL (ref 70–99)
GLUCOSE SERPL-MCNC: 73 MG/DL (ref 70–99)
HCO3 BLDV-SCNC: 30 MMOL/L (ref 24–30)
HCT VFR BLD AUTO: 19.8 % (ref 40–53)
HGB BLD-MCNC: 5.9 G/DL (ref 13.3–17.7)
HGB BLD-MCNC: 7.7 G/DL (ref 13.3–17.7)
HGB BLD-MCNC: 8.1 G/DL (ref 13.3–17.7)
HOLD SPECIMEN: NORMAL
IMM GRANULOCYTES # BLD: 0.2 10E3/UL
IMM GRANULOCYTES NFR BLD: 3 %
LACTATE BLD-SCNC: 0.8 MMOL/L (ref 0.7–2)
LYMPHOCYTES # BLD AUTO: 1.6 10E3/UL (ref 0.8–5.3)
LYMPHOCYTES NFR BLD AUTO: 22 %
MCH RBC QN AUTO: 29.5 PG (ref 26.5–33)
MCHC RBC AUTO-ENTMCNC: 29.8 G/DL (ref 31.5–36.5)
MCV RBC AUTO: 99 FL (ref 78–100)
MONOCYTES # BLD AUTO: 1.2 10E3/UL (ref 0–1.3)
MONOCYTES NFR BLD AUTO: 17 %
NEUTROPHILS # BLD AUTO: 4 10E3/UL (ref 1.6–8.3)
NEUTROPHILS NFR BLD AUTO: 55 %
NRBC # BLD AUTO: 0 10E3/UL
NRBC BLD AUTO-RTO: 0 /100
PCO2 BLDV: 46 MM HG (ref 35–50)
PH BLDV: 7.44 [PH] (ref 7.35–7.45)
PHOSPHATE SERPL-MCNC: 3.2 MG/DL (ref 2.5–4.5)
PLATELET # BLD AUTO: 238 10E3/UL (ref 150–450)
PO2 BLDV: 36 MM HG (ref 25–47)
POTASSIUM BLD-SCNC: 3.6 MMOL/L (ref 3.5–5)
POTASSIUM SERPL-SCNC: 3.7 MMOL/L (ref 3.4–5.3)
PROCALCITONIN SERPL IA-MCNC: 1.64 NG/ML
PROT SERPL-MCNC: 6.2 G/DL (ref 6.4–8.3)
RBC # BLD AUTO: 2 10E6/UL (ref 4.4–5.9)
SATV LHE POCT: 68.6 % (ref 70–75)
SODIUM BLD-SCNC: 135 MMOL/L (ref 136–145)
SODIUM SERPL-SCNC: 134 MMOL/L (ref 136–145)
SPECIMEN EXPIRATION DATE: NORMAL
UNIT ABO/RH: NORMAL
UNIT NUMBER: NORMAL
UNIT STATUS: NORMAL
UNIT TYPE ISBT: 5100
WBC # BLD AUTO: 7.2 10E3/UL (ref 4–11)

## 2023-01-19 PROCEDURE — 94640 AIRWAY INHALATION TREATMENT: CPT

## 2023-01-19 PROCEDURE — 120N000017 HC R&B RESPIRATORY CARE

## 2023-01-19 PROCEDURE — 250N000013 HC RX MED GY IP 250 OP 250 PS 637: Performed by: HOSPITALIST

## 2023-01-19 PROCEDURE — 84295 ASSAY OF SERUM SODIUM: CPT | Performed by: FAMILY MEDICINE

## 2023-01-19 PROCEDURE — 999N000009 HC STATISTIC AIRWAY CARE

## 2023-01-19 PROCEDURE — 250N000009 HC RX 250: Performed by: HOSPITALIST

## 2023-01-19 PROCEDURE — 86901 BLOOD TYPING SEROLOGIC RH(D): CPT | Performed by: FAMILY MEDICINE

## 2023-01-19 PROCEDURE — 86140 C-REACTIVE PROTEIN: CPT | Performed by: FAMILY MEDICINE

## 2023-01-19 PROCEDURE — 82330 ASSAY OF CALCIUM: CPT

## 2023-01-19 PROCEDURE — 250N000012 HC RX MED GY IP 250 OP 636 PS 637: Performed by: HOSPITALIST

## 2023-01-19 PROCEDURE — 94799 UNLISTED PULMONARY SVC/PX: CPT

## 2023-01-19 PROCEDURE — 86923 COMPATIBILITY TEST ELECTRIC: CPT | Performed by: FAMILY MEDICINE

## 2023-01-19 PROCEDURE — 99233 SBSQ HOSP IP/OBS HIGH 50: CPT | Performed by: INTERNAL MEDICINE

## 2023-01-19 PROCEDURE — 92526 ORAL FUNCTION THERAPY: CPT | Mod: GN | Performed by: SPEECH-LANGUAGE PATHOLOGIST

## 2023-01-19 PROCEDURE — 99233 SBSQ HOSP IP/OBS HIGH 50: CPT | Performed by: NURSE PRACTITIONER

## 2023-01-19 PROCEDURE — 80053 COMPREHEN METABOLIC PANEL: CPT

## 2023-01-19 PROCEDURE — 82140 ASSAY OF AMMONIA: CPT | Performed by: FAMILY MEDICINE

## 2023-01-19 PROCEDURE — 999N000157 HC STATISTIC RCP TIME EA 10 MIN

## 2023-01-19 PROCEDURE — 85018 HEMOGLOBIN: CPT | Performed by: FAMILY MEDICINE

## 2023-01-19 PROCEDURE — 84145 PROCALCITONIN (PCT): CPT | Performed by: FAMILY MEDICINE

## 2023-01-19 PROCEDURE — 250N000013 HC RX MED GY IP 250 OP 250 PS 637: Performed by: NURSE PRACTITIONER

## 2023-01-19 PROCEDURE — 85025 COMPLETE CBC W/AUTO DIFF WBC: CPT | Performed by: FAMILY MEDICINE

## 2023-01-19 PROCEDURE — 99233 SBSQ HOSP IP/OBS HIGH 50: CPT | Performed by: FAMILY MEDICINE

## 2023-01-19 PROCEDURE — 97530 THERAPEUTIC ACTIVITIES: CPT | Mod: GP

## 2023-01-19 PROCEDURE — 84100 ASSAY OF PHOSPHORUS: CPT | Performed by: FAMILY MEDICINE

## 2023-01-19 PROCEDURE — 250N000009 HC RX 250: Performed by: NURSE PRACTITIONER

## 2023-01-19 PROCEDURE — 97110 THERAPEUTIC EXERCISES: CPT | Mod: GP

## 2023-01-19 PROCEDURE — 36415 COLL VENOUS BLD VENIPUNCTURE: CPT | Performed by: FAMILY MEDICINE

## 2023-01-19 PROCEDURE — 94640 AIRWAY INHALATION TREATMENT: CPT | Mod: 76

## 2023-01-19 RX ADMIN — ACETYLCYSTEINE 2 ML: 200 SOLUTION ORAL; RESPIRATORY (INHALATION) at 20:36

## 2023-01-19 RX ADMIN — ACETAMINOPHEN 975 MG: 325 TABLET, FILM COATED ORAL at 06:06

## 2023-01-19 RX ADMIN — FOLIC ACID 1 MG: 1 TABLET ORAL at 09:13

## 2023-01-19 RX ADMIN — RIFAXIMIN 550 MG: 550 TABLET ORAL at 21:26

## 2023-01-19 RX ADMIN — Medication 12.5 MG: at 23:12

## 2023-01-19 RX ADMIN — Medication 1 DROP: at 14:17

## 2023-01-19 RX ADMIN — Medication 40 MG: at 21:50

## 2023-01-19 RX ADMIN — MICONAZOLE NITRATE ANTIFUNGAL POWDER: 2 POWDER TOPICAL at 16:59

## 2023-01-19 RX ADMIN — ACETAMINOPHEN 975 MG: 325 TABLET, FILM COATED ORAL at 21:25

## 2023-01-19 RX ADMIN — ANORECTAL OINTMENT: 15.7; .44; 24; 20.6 OINTMENT TOPICAL at 16:59

## 2023-01-19 RX ADMIN — ALBUTEROL SULFATE 2.5 MG: 2.5 SOLUTION RESPIRATORY (INHALATION) at 16:18

## 2023-01-19 RX ADMIN — FLUCONAZOLE 200 MG: 200 TABLET ORAL at 21:23

## 2023-01-19 RX ADMIN — WHITE PETROLATUM: 1.75 OINTMENT TOPICAL at 09:18

## 2023-01-19 RX ADMIN — NYSTATIN 500000 UNITS: 100000 SUSPENSION ORAL at 12:45

## 2023-01-19 RX ADMIN — MIDODRINE HYDROCHLORIDE 5 MG: 5 TABLET ORAL at 09:14

## 2023-01-19 RX ADMIN — Medication 40 MG: at 09:12

## 2023-01-19 RX ADMIN — TACROLIMUS 1 MG: 5 CAPSULE ORAL at 09:13

## 2023-01-19 RX ADMIN — LEVETIRACETAM 1000 MG: 100 SOLUTION ORAL at 21:22

## 2023-01-19 RX ADMIN — Medication 6 MG: at 21:23

## 2023-01-19 RX ADMIN — RIFAXIMIN 550 MG: 550 TABLET ORAL at 09:14

## 2023-01-19 RX ADMIN — LACTULOSE 20 G: 20 SOLUTION ORAL at 09:13

## 2023-01-19 RX ADMIN — MICONAZOLE NITRATE ANTIFUNGAL POWDER: 2 POWDER TOPICAL at 21:48

## 2023-01-19 RX ADMIN — LACOSAMIDE 100 MG: 10 SOLUTION ORAL at 09:12

## 2023-01-19 RX ADMIN — Medication 1 TABLET: at 09:17

## 2023-01-19 RX ADMIN — ALBUTEROL SULFATE 2.5 MG: 2.5 SOLUTION RESPIRATORY (INHALATION) at 20:36

## 2023-01-19 RX ADMIN — LACTULOSE 20 G: 20 SOLUTION ORAL at 21:22

## 2023-01-19 RX ADMIN — ALBUTEROL SULFATE 2.5 MG: 2.5 SOLUTION RESPIRATORY (INHALATION) at 07:24

## 2023-01-19 RX ADMIN — ACETYLCYSTEINE 2 ML: 200 SOLUTION ORAL; RESPIRATORY (INHALATION) at 07:24

## 2023-01-19 RX ADMIN — LACOSAMIDE 100 MG: 10 SOLUTION ORAL at 21:23

## 2023-01-19 RX ADMIN — Medication 1 DROP: at 09:14

## 2023-01-19 RX ADMIN — ACETAMINOPHEN 975 MG: 325 TABLET, FILM COATED ORAL at 14:17

## 2023-01-19 RX ADMIN — NYSTATIN 500000 UNITS: 100000 SUSPENSION ORAL at 17:00

## 2023-01-19 RX ADMIN — TACROLIMUS 1 MG: 5 CAPSULE ORAL at 17:58

## 2023-01-19 RX ADMIN — QUETIAPINE 75 MG: 25 TABLET ORAL at 21:24

## 2023-01-19 RX ADMIN — ALBUTEROL SULFATE 2.5 MG: 2.5 SOLUTION RESPIRATORY (INHALATION) at 11:36

## 2023-01-19 RX ADMIN — Medication 1 DROP: at 21:25

## 2023-01-19 ASSESSMENT — ACTIVITIES OF DAILY LIVING (ADL)
ADLS_ACUITY_SCORE: 61

## 2023-01-19 NOTE — PLAN OF CARE
Problem: Artificial Airway  Goal: Effective Communication  Outcome: Progressing  Goal: Optimal Device Function  Outcome: Progressing  Intervention: Optimize Device Care and Function  Recent Flowsheet Documentation  Taken 1/19/2023 0000 by Lyudmila Galvin RT  Airway Safety Measures: all equipment/monitors on and audible  Taken 1/18/2023 2009 by Lyudmila Galvin RT  Airway Safety Measures: all equipment/monitors on and audible  Goal: Absence of Device-Related Skin or Tissue Injury  Outcome: Progressing     RT PROGRESS NOTE     DATA:     CURRENT SETTINGS:              TRACH TYPE / SIZE: #7 [place], placed on 01/9/22             MODE: capped             FIO2: Room air     ACTION:             THERAPIES: Albuterol QID/Mucomyst BID/Flutter valve QID              SUCTION:                           FREQUENCY: X2                        AMOUNT: small to large                        CONSISTENCY: thick                        COLOR: pale yellow             SPONTANEOUS COUGH EFFORT/STRENGTH OF EFFORT (not elicited by suctioning): weak productive cough                            WEANING PHASE: 3                        WEAN MODE: capped on RA as ashley                        WEAN TIME: Since 07:40 a.m , 01/18                        END WEAN REASON: ongoing      RESPONSE:             BS: coarse              VITAL SIGNS: BP 91/55 (BP Location: Left arm, Patient Position: Right side, Cuff Size: Adult Small)   Pulse 103   Temp 97.4  F (36.3  C) (Oral)   Resp 20   Wt 83.3 kg (183 lb 9.6 oz)   SpO2 92%   BMI 24.90 kg/m                  EMOTIONAL NEEDS / CONCERNS:                  RISK FOR SELF DECANNULATION:                          RISK DUE TO:                          INTERVENTION/S IN PLACE IS/ARE:         NOTE / PLAN: Patient remains capped on room air, tolerating it well. Neb txs given as order. Flutter valve QID. Will cont to monitor.

## 2023-01-19 NOTE — PLAN OF CARE
"  Problem: Anemia  Goal: Anemia Symptom Improvement  Outcome: Progressing  Intervention: Monitor and Manage Anemia  Recent Flowsheet Documentation  Taken 1/19/2023 0955 by Inna Guzman RN  Safety Promotion/Fall Prevention:    activity supervised    bed alarm on    fall prevention program maintained    clutter free environment maintained     Problem: Artificial Airway  Goal: Effective Communication  Outcome: Progressing     Problem: Fall Injury Risk  Goal: Absence of Fall and Fall-Related Injury  Outcome: Progressing  Intervention: Promote Injury-Free Environment  Recent Flowsheet Documentation  Taken 1/19/2023 0955 by Inna Guzman RN  Safety Promotion/Fall Prevention:    activity supervised    bed alarm on    fall prevention program maintained    clutter free environment maintained   Goal Outcome Evaluation:      Patient did not have any signs of pain at the start of shift-later reported a minimal abdominal discomfort intermittently-no complaint of nausea. Patient was at the start of shift, said \"I did not sleep last night, please let me sleep now\", patient noted to be minimally anxious and was requesting to be repositioned frequently at this time too. Blood pressure was 99/55,  at 0758, 94/57,  at 0911, 111/68, midodrine not given as scheduled for 12 noon for a blood pressure of 111/68,  at 1243. Patient's hemoglobin per report was at the start of shift was 5.9, Type and screen was done, I unit of PRBC was ordered to be transfused, blood bank called the unit and requested for a recheck of hemoglobin that changed from 5.9 at 0627 to 7.7 at 0631 , hemoglobin was rechecked to be 8.1 at 1034 (done peripherally). Patient has an order for paracentesis and chest tube placement-IR called that paracentesis can be done in am at 1130, still waiting for a call back for chest tube placement schedule, wife is visiting patient at this time. Patient had a choking episode with meat pieces yesterday per report " at supper time after dialysis, speech therapist was informed (see new recommendations with meals).

## 2023-01-19 NOTE — PLAN OF CARE
Problem: Artificial Airway  Goal: Effective Communication  Outcome: Progressing  Goal: Optimal Device Function  Outcome: Progressing  Intervention: Optimize Device Care and Function  Recent Flowsheet Documentation  Taken 1/19/2023 1618 by Rocio Giang RT  Airway Safety Measures: all equipment/monitors on and audible  Taken 1/19/2023 1136 by Rocio Giang RT  Airway Safety Measures: all equipment/monitors on and audible  Taken 1/19/2023 0724 by Rocio Giang RT  Airway Safety Measures: all equipment/monitors on and audible  Goal: Absence of Device-Related Skin or Tissue Injury  Outcome: Progressing     Problem: Mechanical Ventilation Invasive  Goal: Optimal Device Function  Intervention: Optimize Device Care and Function  Recent Flowsheet Documentation  Taken 1/19/2023 1618 by Rocio Giang RT  Airway Safety Measures: all equipment/monitors on and audible  Taken 1/19/2023 1136 by Rocio Giang RT  Airway Safety Measures: all equipment/monitors on and audible  Taken 1/19/2023 0724 by Rocio Giang RT  Airway Safety Measures: all equipment/monitors on and audible     Problem: Artificial Airway  Goal: Optimal Device Function  Intervention: Optimize Device Care and Function  Recent Flowsheet Documentation  Taken 1/19/2023 1618 by Rocio Giang RT  Airway Safety Measures: all equipment/monitors on and audible  Taken 1/19/2023 1136 by Rocio Giang RT  Airway Safety Measures: all equipment/monitors on and audible  Taken 1/19/2023 0724 by Rocio Giang RT  Airway Safety Measures: all equipment/monitors on and audible     Problem: Mechanical Ventilation Invasive  Goal: Optimal Device Function  Intervention: Optimize Device Care and Function  Recent Flowsheet Documentation  Taken 1/19/2023 1618 by Rocio Giang RT  Airway Safety Measures: all equipment/monitors on and audible  Taken 1/19/2023 1136 by Rocio Giang RT  Airway Safety Measures: all equipment/monitors on and audible  Taken 1/19/2023 0724  by Rocio Giang, RT  Airway Safety Measures: all equipment/monitors on and audible     Problem: Artificial Airway  Goal: Optimal Device Function  Intervention: Optimize Device Care and Function  Recent Flowsheet Documentation  Taken 1/19/2023 1618 by Rocio Giang, RT  Airway Safety Measures: all equipment/monitors on and audible  Taken 1/19/2023 1136 by Rocio Giang,   Airway Safety Measures: all equipment/monitors on and audible  Taken 1/19/2023 0724 by Rocio Giang, RT  Airway Safety Measures: all equipment/monitors on and audible   Goal Outcome Evaluation:

## 2023-01-19 NOTE — PROGRESS NOTES
CLINICAL NUTRITION SERVICES - BRIEF NOTE    Check in with pt on back up bolus feeding tolerance and supplement acceptance    PO intake: Saw pt on 1/16 and discussed need for back up boluses and importance. RN was questioning his tolerance of them on 1/18, visited with her and discussed his appetite had improved slightly and to continue back up boluses for now. On 1/16 and 1/17, it appears pt only needed these once each day. Pt consumed 50-75% of meals yesterday, portions of his supplements, and needed no back up boluses. Consumed 50% breakfast and lunch today, no boluses need yet.    Pt and pt wife report pt loving the supplements, they would like more. Other than supplements, pt enjoying egg salad. He had an incident yesterday with choking on meat (SLP may change diet), so discussed other protein sources if unsure about meat - yogurt, cottage cheese, eggs. Liked the yogurt option and said she will help order more often. Wife says he is ordering small amounts and eating >50% of it but doesn't think he is getting enough to eat. Inquired about if he is able to refuse bolus feedings and says she encourages him not to.    INTERVENTIONS  Recommendations / Nutrition Prescription  Encourage PO intake, ordering more, and supplement use  Consider calorie counts if poor intake persists and still refusing boluses     Implementation  Collaboration with other providers - nurse, SLP   Enteral Nutrition - Continue back up boluses as needed  Requested kitchen to send a weeks worth of menus at a time for wife to be of assistance with ordering  Medical food supplement - increase Gelatein and Ensure to BID each    Rocio Alert  Dietetic Intern

## 2023-01-19 NOTE — PROGRESS NOTES
[facility]    Medicine Progress Note - Hospitalist Service    Date of Admission:  12/29/2022    Brief history:  Blanco Osborne is an 57 year old male who is transferred from [facility] f to [facility] for  IV antibiotic treatment.  Patient has multiple medical problems including history of liver transplant 2016, PAF on chronic anticoagulation, history of DM2, CVA, HTN, CHRISTIAN on BiPAP, and history of alcohol abuse in the past causing liver damage ending with liver transplant.  About 6 weeks ago he had respiratory arrest and was diagnosed with infection with parainfluenza and strep pneumoniae and acute on chronic renal insufficiency ending with CRF needing 3/week hemodialysis.  During this period he was diagnosed with cirrhosis of transplanted liver and hyperammonia.  Currently he is on Lactulose and Rifaximin.        On 12/14/22, due to CIP, he was transferred to [facility] for the first time with Trach, PEG and Rt chest tube.    On 12/16/2022 patient was transferred back to [facility] due to respiratory insufficiency secondary to hydropneumothorax and drainage of 900 cc bloody pleural effusion, bloody stool and transfusion of 2 units PRBC.    On 12/20/2022 he had another episode of PEA arrest followed by CPR x2 minutes and possibly had seizure activity in [facility]. 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  He has been on ventilator and has improved to trach dome tolerating up to 6 hours so far. He had chest tube which was removed.    He had SBP with growth of lactobacillus and is currently on Unasyn which will be switched to Augmentin orally through 1/12/2023.    Pleural effusion grew Candida and is on micafungin.  He is also on  due to cirrhosis and hyperammonia.  He is anemic and is on Retacrit.   He is on lacosamide and Keppra due to history of seizure.  There is question of him drinking again  causing him cirrhosis of the transplanted liver.    LTAC course:  1/10/2023-1/15/2023.  Dialyzing. 1/12,Overnight patient had to be resuscitated with 1 L normal saline bolus after BPA sepsis fired noted to have lactic acid of 2.3. Following morning Hb is 6.6.  Transfused 1 unit of packed red blood cells.1/13,Patient had paracentesis and thoracentesis about 7500 cc of ascitic fluid and 50 cc of pleural fluid yielded. Cytology pending.1/15. Just about the same compared to yesterday. Hb fairly stable. No new changes at this time, continue with current medical management    1/16-1/19: Hgb 7.9 after transfusion and 8.1 on 1/18.  Transfused 1 unit PRBC 1/15/2023.  Apixaban resumed 1/17 since no further tracheal bleeding however currently on hold due to possible need for chest tube and lytics.  Right thoracentesis 1/13 fluid considered exudative, likely parapneumonic complicated effusion per Pulmonology.  Clinically, no fevers, normal WBC, right pleural fluid cultures NGTD.  ID consulted for antibiotic recommendations.  CT chest/abdomen/pelvis ordered 1/18, shows concerns for infected parapneumonic effusions and SBP.  Lantus decreased to 10 and down to 5 units from 20 given relatively low glucose in the last few days and patient refusing to eat meals at times.  Given CT findings, discussed with Pulmonology team for need for chest tube and lytics, transfer to higher level of care.  Will need to repeat paracentesis for ascites culture for r/o SBP.  Diagnostic/therapeutic paracentesis ordered while awaiting ID recommendations.  Repeat labs with CBC, lactate, CRP, procalcitonin and ammonia pending.  If elevated ammonia or inflammatory markers, will lean towards empirically starting antibiotics.  Call placed to SOC for finding bed availability within [facility].    Assessment & Plan   Principal Problem:    Acute on chronic respiratory failure with hypoxia (H)  Active Problems:    Acquired immunocompromised state (H)     Cirrhosis of liver (H)    NAFLD (nonalcoholic fatty liver disease)    Liver transplanted (H)    Immunosuppression (H)    Critical illness myopathy    Seizures (H)    History of sudden cardiac arrest, PEA    ESRD (end stage renal disease) on dialysis (H)    Anemia of chronic disease due to ESRD, cirrhosis and hypersplenism      Acute now chronic respiratory failure requiring tracheostomy.  Initial event was parainfluenza and strep pneumo pneumonia.  Had failed extubation x2 and tracheostomy performed last hospitalization.  Had additional complications of bilateral pneumothoraces.  (Most recently was a hydropneumothorax where fluid grew Candida.. Chest tube was placed and removed . Had decent tolerance for pressure support and trach dome but after few hours and trach dome 12/27 had a pretty significant episode of desaturation.   -Wean ventilator per Pulmonology.  -Tracheostomy care per RT    Pneumonia  ARDS  Right pneumothorax  Bilateral pleural effusions  Treated for ARDS pneumonia, Aspergillus pneumonia during prior hospitalization and right chest tube now removed.  Left pleural fluid positive for Candida parapsilosis treated with IV micafungin and currently on fluconazole, to finish 4-week course.  Concern for right complicated parapneumonic effusion. S/p thoracentesis 11/26 and 1/13.  Right thoracentesis 1/13 consistent with exudative effusion with elevated LDH, high neutrophils, cultures show NGTD.    -Pulmonology considering intrapleural lytics or surgical intervention if any further deterioration, transfer to higher level of care  -Continue fluconazole to finish course for left pleural fluid Candida parapsilosis   -ID consulted 1/17 for further antibiotic recommendations  -CT chest/abdomen/pelvis 1/18: Bilateral small loculated effusions with associated pleural enlargement concerning for infected effusions, large volume ascites with peritoneal enhancement concerning for SBP.    History PEA  Hypotension  PEA  arrest prior to his previous admission and 1 episode of PEA associated with desaturation on 12/20.  No structural cardiac disease but does have A. Fib which neurologist thought might have been contributory to his embolic strokes.  Anticoagulation been restarted with stable labs although high risk for bleeding seconday to thrombocytopenia.  Also has developed baseline hypotension and is on midodrine 10 mg 3 times daily.  -Continue current midodrine dose      Infectious disease  At prior hospital:  1. 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 4.  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  Have had some conversations within our group and with transplant ID and general infectious disease.  General consensus is that ganciclovir probably could be discontinued  2. H/o Strep/Parainfluenza infection last hospitalization. Completed treatment course  3. 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  4.  Lactobacillus growing in the broth 4 days after paracentesis.  Cell count was very low.  5.  Candida in left pleural fluid cultures.  Sensitivities not resulted.  6.  Immunosuppressed on tacrolimus.  Discussed with transplant service at the [facility] who felt that given the absence of rejection on his most recent biopsy and ongoing issues with infection continuing with 1 twice daily tacrolimus is the best current option.  They have seen his subtherapeutic trough levels.  -Received Unasyn for 2 weeks total.  Changed to Augmentin, finished course on 1/12/2023.  -At least 4 weeks of antimicrobial for the Candida.   -Continue tacrolimus as above and tapering steroids  -Remains off ganciclovir    S/p Liver transplant 2016  Cirrhosis with ascites  Subacute bacterial peritonitis  Main manifestations of this are hepatic encephalopathy and ascites.  Hepatologist at [facility] felt that most likely reason for the cirrhosis was metabolic syndrome or alcohol intake.  There was no evidence of rejection on biopsy and there was some evidence of regeneration.  Paracentesis done on 12/22/2022 showed lactobacillus indicating SBP, treated with Unasyn and Augmentin, finished 2-week course 1/12/2023.  Requires large-volume paracentesis periodically for recurring ascites.  Repeat CT abdomen/pelvis on 1/18 concerning for SBP given peritoneal enhancement.  -Paracentesis 1/13/2023 yielded about 7500 cc.  Cytology studies pending.  Ascites fluid cultures NGTD.    -Plan to start empiric antibiotics if inflammatory markers elevated, given SBP concerns on CT imaging 1/18/2023  -Continue intermittent paracentesis as needed if develops symptomatic ascites.    -Further treatment for recurrent ascites with TIPS defer to his Liver Transplant team and Hepatology follow-up appointment  -He has an appointment on 4/21/2023 with Hepatology, Dr. Simms  -Tacrolimus 1 mg BID and tapering steroids   -Lactulose and Rifaximin as above    Seizure after hypoxic event.  This is in the context of baseline multifactorial encephalopathy secondary to his ongoing critical illness and prior cardiac arrests and prior strokes and cirrhosis with hepatic encephalopathy. MRI of head of 12/20/22 reveals no PRES or leptomeningeal enhancement but scattered.  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   - Continue with  Keppra  indefinitely.  Neurology follow-up once discharge from LTACH.    Paroxysmal atrial fibrillation  Remains in sinus rhythm.  On anticoagulation with apixaban indefinitely for stroke prophylaxis.  Anticoagulation held since 1/6 due to tracheal bleeding noted during suctioning.  Apixaban resumed 1/17 since no further tracheal bleeding  -Continue anticoagulation indefinitely for secondary stroke prevention with apixaban      ESRD: Now on iHD.  No return of renal function.  - MWF schedule     Acute anemia  Status post 1 unit PRBCs transfusion 1/12/2023  -Repeat Hb 7.8, 1/12/2023  -Hgb 8.1 on 1/18.  Spuriously low hemoglobin of 5.9 drawn from PICC  line earlier  -No evidence of overt bleeding.  -Anemia most likely secondary to critical illness/chronic disease, chronic renal disease  -Transfuse packed red blood cells to keep Hb> 7  -Monitor H&H.    Diabetes mellitus type 2  Titrating insulin for hypoglycemia since patient sometimes refuses to eat meals leading to subsequent hypoglycemia.   2. He is on steroids which are weaning  Plan:  -Lantus decreased to 10 units from 20 given relatively low glucose on 1/17.  Further decreased to 5 units daily on 1/19  -Hypoglycemic protocol in place    Moderate malnutrition, protein and calorie type.  -Continue with tube feeds via PEG tube  -Nutritionist consulted and following     Pancytopenia due to cirrhosis, ESRD and hypersplenism. WBC count has improved.  However he needs frequent PRBC transfusion due Hb <7.0.  Patient is on anticoagulation for PAF.  Currently in sinus rhythm.    -Monitor CBC, transfuse PRBC as needed for hemoglobin <7 as above        Diet: Adult Formula Bolus Feeding: Novasource Renal; Route: Gastrostomy; 3 times daily; Volume per Bolus: 250; mL(s); Additional free water (mL): 100 ml b/4 and after each bolus feeding if given; 125 ml/h x 2 h back up bolus feeding after each meal ONLY...  Soft & Bite Sized Diet (level 6) Liquidized/Moderately Thick (level 3)  Snacks/Supplements Adult: Gelatein Plus; Between Meals  Snacks/Supplements Adult: Ensure Enlive; Between Meals    DVT Prophylaxis: DOAC  Nixon Catheter: Not present  Central Lines: PRESENT      Cardiac Monitoring: None  Code Status: Full Code      Disposition Plan     Expected Discharge Date: 01/30/2023    Discharge Delays: Complex Discharge    Discharge Comments: Vent. Trach. Phase 1. PEG.  Rectal Tube.      The patient's care was discussed with the Bedside Nurse, Patient and Patient's Family.    SPARKLE CRUZ MD  Hospitalist Service  LTACH  Securely message with the Vocera Web Console (learn more here)  Text page via [employer] Paging/Directory      Clinically Significant Risk Factors          # Hypocalcemia: Lowest iCa = 1.19 mg/dL in last 2 days, will monitor and replace as appropriate     # Hypoalbuminemia: Lowest albumin = 1.8 g/dL at 12/29/2022  4:40 AM, will monitor as appropriate             # Severe Malnutrition: based on nutrition assessment      _____________________________________________________________________    Interval History   No new events overnight per RN  Glucose in low 70s this morning  Hemoglobin 5.9, repeat Hgb drawn from peripheral site 8.1   CT chest/abdomen/pelvis 1/18 shows concerns for infected, loculated pleural effusions, SBP  Pulmonology recommends chest tube, possible lytics.  ID consulted for antibiotic recommendations  Family updated      Data reviewed today: I reviewed all medications, new labs and imaging results over the last 24 hours. I personally reviewed.    Physical Exam   Vital Signs: Temp: 97.7  F (36.5  C) Temp src: Oral BP: 99/55 Pulse: 102   Resp: 20 SpO2: 92 % O2 Device: None (Room air)    Weight: 186 lbs 3.2 oz   General: Alert, oriented.  Frail and debilitated.  HEENT: Normal conjunctiva, anicteric.  Neck: Supple.  Trach noted  Lungs: Clear to auscultation  Heart: S1, S2.  No murmurs  Abdomen: Soft, minimally distended, ascites noted, bowel sounds noted  Extremities: No peripheral edema.  PICC line in place.  Skin: Ecchymotic spots around neck.  No rash or ulcers.    Data   Recent Labs   Lab 01/19/23  0631 01/19/23  0627 01/19/23  0612 01/18/23  0822 01/17/23  1903 01/17/23  0952 01/17/23  0555 01/16/23  0635 01/14/23  0759 01/14/23  0615 01/13/23  1753 01/13/23  1750 01/13/23  0553   0000   WBC  --  7.2  --   --   --   --   --   --   --  6.2  --  7.0  --   --    HGB 7.7* 5.9*  --   --   --  8.1*  --  7.9*  --  7.7*   < > 8.6*  --    < >   MCV  --  99  --   --   --   --   --   --   --  99  --  99  --   --    PLT  --  238  --   --   --   --   --   --   --  214  --  253  --   --    *  --   --   --   --   136  --  133*  --  134*   < >  --   --   --    POTASSIUM 3.6  --   --   --   --  3.6  --  4.0  --  4.1   < >  --   --   --    CHLORIDE  --   --   --   --   --   --   --  94*  --  97*  --   --   --   --    CO2  --   --   --   --   --   --   --  27  --  28  --   --   --   --    BUN  --   --   --   --   --   --   --  54.7*  --  34.1*  --   --   --   --    CR  --   --   --   --   --   --   --  3.18*  --  1.94*  --   --   --   --    ANIONGAP  --   --   --   --   --   --   --  12  --  9  --   --   --   --    KELLY  --   --   --   --   --   --   --  9.4  --  8.9  --   --   --   --    GLC 71  --  73 80   < > 104   < > 96   < > 78   < >  --   --   --    ALBUMIN  --   --   --   --   --   --   --   --   --  1.9*  --   --   --   --    PROTTOTAL  --   --   --   --   --   --   --   --   --  6.4  --   --  6.5  --    BILITOTAL  --   --   --   --   --   --   --   --   --  0.7  --   --   --   --    ALKPHOS  --   --   --   --   --   --   --   --   --  250*  --   --   --   --    ALT  --   --   --   --   --   --   --   --   --  11  --   --   --   --    AST  --   --   --   --   --   --   --   --   --  23  --   --   --   --     < > = values in this interval not displayed.     Medications     dextrose       - MEDICATION INSTRUCTIONS -         sodium chloride 0.9%  300 mL Hemodialysis Machine During Dialysis/CRRT (from stock)     acetaminophen  975 mg Per Feeding Tube Q8H BIJU    Or     acetaminophen  650 mg Rectal Q8H BIJU     acetylcysteine  2 mL Nebulization BID     albuterol  2.5 mg Nebulization 4x daily     [Held by provider] apixaban ANTICOAGULANT  5 mg Oral or Feeding Tube BID     carboxymethylcellulose PF  1 drop Both Eyes TID     chlorhexidine  15 mL Mouth/Throat BID     epoetin mirta-epbx  40,000 Units Subcutaneous Weekly     fluconazole  200 mg Oral or Feeding Tube At Bedtime     folic acid  1 mg Oral or Feeding Tube Daily     heparin Lock (1000 units/mL High concentration)  1,900 Units Intracatheter During Dialysis/CRRT (from stock)      heparin Lock (1000 units/mL High concentration)  1,900 Units Intracatheter During Dialysis/CRRT (from stock)     insulin aspart  1-6 Units Subcutaneous Daily     insulin glargine  10 Units Subcutaneous Daily     lacosamide  100 mg Oral or Feeding Tube BID     lactulose  20 g Oral or Feeding Tube BID     levETIRAcetam  1,000 mg Oral or Feeding Tube Q24H     Melatonin  6 mg Oral or NG Tube At Bedtime     midodrine  10 mg Oral During Dialysis/CRRT (from stock)     midodrine  5 mg Oral or Feeding Tube TID w/meals     mineral oil-hydrophilic petrolatum   Topical Daily     multivitamin RENAL  1 tablet Per Feeding Tube Daily     nystatin  500,000 Units Swish & Swallow 4x Daily     pantoprazole  40 mg Per Feeding Tube BID     QUEtiapine  75 mg Oral or Feeding Tube At Bedtime     rifaximin  550 mg Oral or Feeding Tube BID     sodium chloride (PF)  10-30 mL Intracatheter Q8H     tacrolimus  1 mg Oral or Feeding Tube BID

## 2023-01-19 NOTE — PROGRESS NOTES
[facility]    Infectious Disease Progress Note    Date of Service : 01/19/2023     Assessment & Plan   Blanco Osborne is a 57 year old male who was admitted on 12/29/2022.       ASSESSMENT:     1. Liver transplantation 2016, on tacrolimus  2. Prolonged hospitalization [id number] for PEA cardiac arrest, pneumococcal/parainfluenza pneumonia with ARDS, septic shock with multiorgan failure  3. Hemodialysis  4. Status post trach/PEG  5. Right pneumothorax requiring previous chest tube.  6. Discharged to LTAC with chest tube, and hospitalized at [facility] on 12/16/2022 with PEA arrest and seizures, GI bleed, left pneumothorax  7. 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 Spontaneous bacterial peritonitis ascites cultures had previously shown lactobacillus  9. 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 Seizure onset with evidence of CNS embolic infarct unclear source.  11. Thoracentesis on 1/13/2023: 7918 nucleated cells,, 93% neutrophils, 60,000 RBC, right pleural cavity.  12. Worsening ascites, loculated pleural effusion, concerning for infection, drainage scheduled at [facility] on 1/20/2023.     DISCUSSION:      Patient had recent prolonged hospitalizations for pneumococcal sepsis, pneumonia, ARDS, septic shock, multiorgan system failure, currently hemodialysis dependent.  Had episodes of PEA cardiac arrest in the past.  Rule effusion, status post thoracentesis  Ascites status post paracentesis  Cultures in process from pleural fluid and negative to date        1.  According to the ID physician at [facility], HSV DNA quant and serum was elevated, no encephalitis on CSF, and per transplant team at [facility], patient was given ganciclovir.  Completed 12/26/2022.  2. Patient peritonitis secondary to lactobacillus was treated with Unasyn, and transition to oral Augmentin for 4 weeks.  Augmentin completed  1/11/2023.  3. 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        RECOMMENDATION:     1. Continue fluconazole at least until 1/23/2023, 4-week course  2. Follow pleural fluid cultures, blood culture results  3. Updated patient's and patient's wife at bedside  4. Updated patient's nurse.    5. CT chest abdomen and pelvis showed loculated effusion, ascites, plan for drainage, may need hospitalization at [facility]  6. If aspirate concerning for infection, start empiric meropenem and vancomycin    Please call with questions.  Awaiting transfer to acute hospital per discussion with patient's wife and chart review, review of hospitalist note.        Darling Emmanuel MD  [facility]  [phone]      Interval History   Fatigue, worsening ascites, breathing same  Patient's wife had several questions which were answered    Physical Exam   Temp: 97.7  F (36.5  C) Temp src: Oral BP: 111/68 Pulse: 103   Resp: 22 SpO2: 99 % O2 Device: None (Room air)    Vitals:    01/16/23 0325 01/18/23 0411 01/19/23 0253   Weight: 83.6 kg (184 lb 4.8 oz) 83.3 kg (183 lb 9.6 oz) 84.5 kg (186 lb 3.2 oz)     Vital Signs with Ranges  Temp:  [97.4  F (36.3  C)-98  F (36.7  C)] 97.7  F (36.5  C)  Pulse:  [] 103  Resp:  [20-24] 22  BP: ()/(55-79) 111/68  Cuff Mean (mmHg):  [75-89] 86  SpO2:  [92 %-99 %] 99 %    Constitutional: Awake, no apparent distress  Lungs: normal breathing pattern, no crackles or wheezing  Cardiovascular:  S1 S2  Abdomen: Ascites  Skin: warm  Neuro: deconditioned  Psych: able to answer questions    Medications     dextrose       - MEDICATION INSTRUCTIONS -         sodium chloride 0.9%  300 mL Hemodialysis Machine During Dialysis/CRRT (from stock)     acetaminophen  975 mg Per Feeding Tube Q8H BIJU    Or     acetaminophen  650 mg Rectal Q8H BIJU     acetylcysteine  2 mL Nebulization BID     albuterol  2.5 mg Nebulization 4x daily     [Held  by provider] apixaban ANTICOAGULANT  5 mg Oral or Feeding Tube BID     carboxymethylcellulose PF  1 drop Both Eyes TID     chlorhexidine  15 mL Mouth/Throat BID     epoetin mirta-epbx  40,000 Units Subcutaneous Weekly     fluconazole  200 mg Oral or Feeding Tube At Bedtime     folic acid  1 mg Oral or Feeding Tube Daily     heparin Lock (1000 units/mL High concentration)  1,900 Units Intracatheter During Dialysis/CRRT (from stock)     heparin Lock (1000 units/mL High concentration)  1,900 Units Intracatheter During Dialysis/CRRT (from stock)     insulin aspart  1-6 Units Subcutaneous Daily     insulin glargine  5 Units Subcutaneous Daily     lacosamide  100 mg Oral or Feeding Tube BID     lactulose  20 g Oral or Feeding Tube BID     levETIRAcetam  1,000 mg Oral or Feeding Tube Q24H     Melatonin  6 mg Oral or NG Tube At Bedtime     midodrine  10 mg Oral During Dialysis/CRRT (from stock)     midodrine  5 mg Oral or Feeding Tube TID w/meals     mineral oil-hydrophilic petrolatum   Topical Daily     multivitamin RENAL  1 tablet Per Feeding Tube Daily     nystatin  500,000 Units Swish & Swallow 4x Daily     pantoprazole  40 mg Per Feeding Tube BID     QUEtiapine  75 mg Oral or Feeding Tube At Bedtime     rifaximin  550 mg Oral or Feeding Tube BID     sodium chloride (PF)  10-30 mL Intracatheter Q8H     tacrolimus  1 mg Oral or Feeding Tube BID       Data   All microbiology laboratory data reviewed.  Recent Labs   Lab Test 01/19/23  1034 01/19/23  0631 01/19/23  0627 01/16/23  0635 01/14/23  0615 01/13/23  1753 01/13/23  1750   WBC  --   --  7.2  --  6.2  --  7.0   HGB 8.1* 7.7* 5.9*   < > 7.7*   < > 8.6*   HCT  --   --  19.8*  --  25.3*  --  29.1*   MCV  --   --  99  --  99  --  99   PLT  --   --  238  --  214  --  253    < > = values in this interval not displayed.     Recent Labs   Lab Test 01/19/23  0627 01/16/23  0635 01/14/23  0615   CR 2.21* 3.18* 1.94*     No lab results found.  Recent Labs   Lab Test  09/28/16  1600 09/23/16  1458 09/20/16  1901   CULT No VRE isolated No growth Culture negative for acid fast bacilli  Assayed at [facility] [employer] [address]    Culture negative after 4 weeks  No anaerobes isolated  No growth       MICROBIOLOGY:    Reviewed    7-Day Micro Results     Collected Updated Procedure Result Status      01/13/2023 1737 01/18/2023 2304 Blood Culture Hand, Left [[id number]]   Blood from Hand, Left    Final result Component Value   Culture No Growth               01/13/2023 1737 01/18/2023 2304 Blood Culture Hand, Right [[id number]]   Blood from Hand, Right    Final result Component Value   Culture No Growth               01/13/2023 1259 01/13/2023 2006 Cell count with differential fluid [[id number]]    (Abnormal)   Pleural fluid from Pleural Cavity, Right    Final result Component Value   No component results            01/13/2023 1259 01/17/2023 1405 Cytology, non-gynecologic [[id number]]   Pleural fluid from Pleural Cavity, Right    Final result Component Value   Final Diagnosis This result contains rich text formatting which cannot be displayed here.   Comment This result contains rich text formatting which cannot be displayed here.   Clinical Information This result contains rich text formatting which cannot be displayed here.   Gross Description This result contains rich text formatting which cannot be displayed here.   Microscopic Description This result contains rich text formatting which cannot be displayed here.   Performing Labs This result contains rich text formatting which cannot be displayed here.            01/13/2023 1259 01/13/2023 1747 Glucose fluid [[id number]]    Pleural fluid from Pleural Cavity, Right    Final result Component Value Units   Glucose Fluid Source Pleural Cavity, Right    Glucose fluid 2 mg/dL            01/13/2023 1259 01/13/2023 2239 Lactate dehydrogenase fluid [[id number]]    Pleural fluid from Pleural Cavity, Right     Final result Component Value Units   LD Fluid Source Pleural Cavity, Right    Lactate dehydrogenase fluid >2,500 U/L            01/13/2023 1259 01/13/2023 1747 Protein fluid [[id number]]    Pleural fluid from Pleural Cavity, Right    Final result Component Value Units   Protein Fluid Source Pleural Cavity, Right    Protein Total Fluid 3.1 g/dL            01/13/2023 1259 01/13/2023 1331 Acid-Fast Bacilli Culture and Stain [[id number]]    Pleural fluid from Pleural Cavity, Right    In process Component Value   No component results            01/13/2023 1259 01/18/2023 1716 Anaerobic bacterial culture [[id number]]    Pleural fluid from Pleural Cavity, Right    Preliminary result Component Value   Culture No anaerobic organisms isolated after 5 days  [P]                01/13/2023 1259 01/13/2023 1530 pH fluid [[id number]]    Pleural fluid from Pleural Cavity, Right    Final result Component Value Units   pH Fluid Source Pleural Cavity, Right    pH Fluid 7.5 pH            01/13/2023 1259 01/18/2023 1716 Fungal or Yeast Culture Routine [[id number]]   Pleural fluid from Pleural Cavity, Right    Preliminary result Component Value   Culture No growth after 5 days  [P]                01/13/2023 1259 01/18/2023 0657 Pleural fluid Aerobic Bacterial Culture Routine with Gram Stain [[id number]]   Pleural fluid from Pleural Cavity, Right    Final result Component Value   Culture No Growth   Gram Stain Result No organisms seen    4+ WBC seen    Predominantly PMNs               01/13/2023 1259 01/13/2023 1930 Cell count with differential fluid [[id number]]    (Abnormal)   Ascites Fluid from Paracentesis    Final result Component Value   No component results            01/13/2023 1259 01/13/2023 1742 Albumin fluid [[id number]]    Ascites Fluid from Paracentesis    Final result Component Value Units   Albumin Fluid Source Abdomen    ascites  ascites  ascites   Albumin fluid 0.5 g/dL            01/13/2023 1259 01/13/2023  1742 Protein fluid [[id number]]    Ascites Fluid from Paracentesis    Final result Component Value Units   Protein Fluid Source Abdomen    ascites  ascites  ascites  ascites   Protein Total Fluid 1.4 g/dL            01/13/2023 1259 01/13/2023 1821 Gram stain [[id number]]   Ascites Fluid from Abdomen    Final result Component Value   Gram Stain Result No organisms seen   Gram Stain Result 3+ WBC seen   Predominantly PMNs            01/13/2023 1259 01/18/2023 1731 Anaerobic bacterial culture [[id number]]    Ascites Fluid from Abdomen    Preliminary result Component Value   Culture No anaerobic organisms isolated after 5 days  [P]                01/13/2023 1259 01/13/2023 2006 Cell Count Body Fluid [[id number]]    (Abnormal)   Pleural fluid from Pleural Cavity, Right    Final result Component Value Units   Color Red    Clarity Cloudy    Total Nucleated Cells 7,918 /uL   RBC Count 60,000 /uL   Cell Count Fluid Source Pleural Cavity, Right             01/13/2023 1259 01/15/2023 1827 Acid-Fast Bacilli Culture and Stain [[id number]]    Pleural fluid from Pleural Cavity, Right    Preliminary result Component Value   Acid Fast Stain No acid fast bacilli seen  [P]    Less than 5 mL of specimen received. A minimum of 5 mL of sputum or fluid is recommended for recovery of acid fast bacilli (AFB). Volumes less than 5 mL are suboptimal and may compromise recovery of AFB from culture.   Acid Fast Stain No acid fast bacilli seen  [P]    Less than 5 mL of specimen received. A minimum of 5 mL of sputum or fluid is recommended for recovery of acid fast bacilli (AFB). Volumes less than 5 mL are suboptimal and may compromise recovery of AFB from culture.  This is an appended report. These results have been appended to a previously preliminary verified report.            01/13/2023 1259 01/13/2023 1930 Cell Count Body Fluid [[id number]]    (Abnormal)   Ascites Fluid from Paracentesis    Final result Component Value Units    Color Yellow    Clarity Turbid    Total Nucleated Cells 180 /uL   RBC Count 96 /uL   Cell Count Fluid Source Abdomen    ascites  ascites  ascites  ascites            01/13/2023 1259 01/13/2023 2006 Differential Body Fluid [[id number]]    Pleural fluid from Pleural Cavity, Right    Final result Component Value Units   % Neutrophils 93 %   % Lymphocytes 7 %   % Monocyte/Macrophages --             01/13/2023 1259 01/13/2023 1908 Differential Body Fluid [[id number]]    Ascites Fluid from Paracentesis    Final result Component Value Units   % Neutrophils 16 %   % Lymphocytes 42 %   % Monocyte/Macrophages 40 %   % Lining Cells 2 %   Absolute Neutrophils, Body Fluid 28.8 /uL            01/13/2023 1259 01/18/2023 0656 Ascites Fluid Aerobic Bacterial Culture Routine [[id number]]   Ascites Fluid from Abdomen    Final result Component Value   Culture No Growth                      RADIOLOGY:    Reviewed  CT Chest/Abdomen/Pelvis w Contrast    Result Date: 1/18/2023  EXAM: CT CHEST/ABDOMEN/PELVIS W CONTRAST LOCATION: [facility] DATE/TIME: 1/18/2023 1:23 PM INDICATION: Follow up pleural effusion, ascites COMPARISON: 01/06/2023 TECHNIQUE: CT scan of the chest, abdomen, and pelvis was performed following injection of IV contrast. Multiplanar reformats were obtained. Dose reduction techniques were used. CONTRAST: IsoVue 370 100mL FINDINGS: LUNGS AND PLEURA: Small bilateral loculated pleural effusions with thin pleural enhancement, slightly decreased compared to 01/06/2023. Airspace opacities at both lung bases are unchanged and likely compressive atelectasis. Decreased presumed hematoma in  the right lung. Tracheostomy in good position. MEDIASTINUM/AXILLAE: No lymphadenopathy. No thoracic aortic aneurysms. Right neck and right upper terminate central venous catheters terminate in the right atrium and lower SVC, respectively. No central pulmonary emboli. CORONARY ARTERY CALCIFICATION: Mild.  HEPATOBILIARY: Liver transplant. No biliary dilation. Uniform parenchymal enhancement. PANCREAS: Normal. SPLEEN: Enlarged, 18.3 cm craniocaudal, previously 18.1 cm ADRENAL GLANDS: Normal. KIDNEYS/BLADDER: Unchanged nonobstructing calculus in the upper pole right kidney. The bladder is completely collapsed and poorly evaluated. No renal collecting system dilation. BOWEL: Percutaneous gastrostomy tube with the balloon now in the lumen and no longer closely apposed to the gastric wall. No evidence of acute inflammation or obstruction. Rectal tube in situ. PERITONEUM: Large volume ascites within the peritoneal enhancement, as well as peritoneal and mesenteric fat stranding. LYMPH NODES: Normal. VASCULATURE: Moderate diffuse atherosclerotic calcification of the abdominal aorta and iliofemoral arteries. Extensive collaterals throughout the omentum in the upper abdomen. Portomesenteric veins are patent. Hepatic veins not well opacified probably due to timing. PELVIC ORGANS: Normal. MUSCULOSKELETAL: Tiny fat-containing supraumbilical ventral hernia. Diffuse muscular atrophy. No aggressive or destructive osseous lesions. Degenerative changes in the spine. Unchanged mild compression deformities of T10 and L1.     IMPRESSION: 1.  Small bilateral partially loculated pleural effusions with associated pleural enhancement consistent with infected effusions. Size mildly decreased compared to 01/06/2023. 2.  Large volume ascites with thin peritoneal enhancement consistent with bacterial peritonitis. Volume of ascites is similar to the prior CT. Mesenteric and omental fat stranding is nonspecific, but most likely related to underlying infection and portal  hypertension. 3.  Percutaneous gastrostomy tube balloon is now within the lumen and no longer closely apposed to the gastric wall, suggesting interval displacement. Correlate for malfunction.    CTA Chest Abdomen Pelvis w Contrast    Result Date: 1/7/2023  EXAM: CTA CHEST ABDOMEN  PELVIS W CONTRAST LOCATION: [facility] DATE/TIME: 1/6/2023 11:56 PM INDICATION: Acute blood loss, worsening bloody tracheal secretions, abdomen more distended, rule out other source of potential bleed. COMPARISON: CT of the chest 12/20/2022 TECHNIQUE: CT angiogram chest abdomen pelvis during arterial phase of injection of IV contrast. 2D and 3D MIP reconstructions were performed by the CT technologist. Dose reduction techniques were used. CONTRAST: Isovue 370 100 mL FINDINGS: CT ANGIOGRAM CHEST, ABDOMEN, AND PELVIS: There are mild calcifications seen with no dissection, serge intimal tearing, aneurysm, high-grade stenosis, or intraluminal clot identified. There is good flow extending into both groins. LUNGS AND PLEURA: There is a mild to moderate-sized right pleural effusion and a mild left pleural effusion with mild to moderate bilateral atelectasis in the lower lobes bilaterally with no central airway obstruction. There is a benign calcified granuloma seen in the right lung. The bilateral pleural effusions may be chronic in nature given there appears to be some thin enhancement involving the periphery of both pleural effusions which may represent a loculated component. There is a minute amount of gas seen associated with the left pleural effusion and this is presumed to be iatrogenic in nature. There is an area of linear increased density in the anterior medial aspect of the right lower lobe appears slightly smaller than prior, the 12/20/2022, and of decreased density and this likely represents sequelae of the prior suggested hematoma. MEDIASTINUM/AXILLAE: There is a tracheostomy which appears to be in good position. 4.8 x 6.4 mm low-density lesion in the left thyroid lobe with Hounsfield units most typical for a small cyst. Minimal filling defect lower aspect of the trachea to the right of midline which is presumed to represent sputum. Central line is seen with distal tip within the right  atrium. The main pulmonary vein is distended at 4.0 cm and likely represents sequelae of pulmonary arterial hypertension. CORONARY ARTERY CALCIFICATION: Moderate. HEPATOBILIARY: The gallbladder is surgically absent. The bile ducts and liver are normal. PANCREAS: Normal. SPLEEN: Splenomegaly with the spleen measuring 18.1 cm greatest longitudinal dimension. Mild scattered varicosities. These findings can be associated with portal venous hypertension. ADRENAL GLANDS: Normal. KIDNEYS/BLADDER: Bilateral atrophy of the kidneys with no hydronephrosis. The bladder is deflated likely secondary to pressure from the ascites. BOWEL: There is an endorectal catheter seen. This appears to be in good position. Slight findings of diverticulosis with no evidence for diverticulitis. Percutaneous gastrostomy tube and this appears be in good position. There is a large amount of abdominal and pelvic ascites. There is significant abnormal stranding of the mesenteric fat/omentum which may be inflammatory or neoplastic in nature. LYMPH NODES: Normal. PELVIC ORGANS: Normal. MUSCULOSKELETAL: Moderate multilevel degenerative changes are seen most marked in the thoracolumbar spine. Slight anterior wedging of the L1 vertebral body may represent an old-appearing compression fracture with no retropulsion. Old rib fractures.     IMPRESSION: 1. No obvious active bleeding identified. 2. Large amount of abdominal/pelvic ascites, splenomegaly, and scattered varicosities. The above findings would be most typical for sequelae of portal venous hypertension. 3. Significant stranding of the omental fat and intraperitoneal fat, this is nonspecific, but may be reactive or neoplastic in nature. 4. Percutaneous gastrostomy tube, tracheostomy, and the rectal tube appear to be in good position. 5. Central catheter with tip in the inferior right atrium. 6. Mild to moderate right pleural effusion and mild left pleural effusion, both of these appear to be loculated  given the peripheral enhancement. Presumed sequelae of prior hematoma seen in the right lung. 7. Minute amount of gas seen associated with the left pleural effusion and this is presumed to be iatrogenic in nature. 8. Slight filling defect within the lower trachea most typical for slight mucous. 9. Enlargement of the main pulmonary artery presumed to represent sequelae of pulmonary arterial hypertension.     US Paracentesis without Albumin    Result Date: 1/13/2023  EXAM: 1. PARACENTESIS 2. ULTRASOUND GUIDANCE LOCATION: [facility] DATE/TIME: 1/13/2023 1:09 PM INDICATION: Ascites. PROCEDURE: Informed consent obtained. Time out performed. The abdomen was prepped and draped in a sterile fashion. 10 mL of 1% lidocaine was infused into local soft tissues. A 5 [place] catheter system was introduced into the abdominal ascites under ultrasound guidance. 7.5  liters of milky white  fluid were removed and sent to lab if requested. Patient tolerated procedure well. Ultrasound imaging was obtained and placed in the patient's permanent medical record.     IMPRESSION: 1.  Status post ultrasound-guided paracentesis. Reference CPT Code: 38542    US Paracentesis Portable    Result Date: 12/22/2022  US PARACENTESIS PORTABLE 12/22/2022 1:43 PM CLINICAL HISTORY: Abdominal pain PROCEDURE: Informed consent obtained from the patient's wife over the telephone by Dr. Tatum yesterday. Time out performed. The abdomen was prepped and draped in a sterile fashion. 10 mL of 1% lidocaine was infused into local soft tissues. An 8 [place] catheter system was introduced into the abdominal ascites under ultrasound guidance. 4 liters of clear fluid were removed and sent to lab if requested. Patient tolerated procedure well. Ultrasound imaging was obtained and placed in the patient's permanent medical record.     IMPRESSION: 1.  Status post ultrasound-guided paracentesis. JULIAN PETE MD   SYSTEM ID:  [id number]    US  Thoracentesis    Result Date: 1/13/2023  EXAM: 1. RIGHT  THORACENTESIS 2. ULTRASOUND GUIDANCE LOCATION: [facility] DATE/TIME: 1/13/2023 12:56 PM INDICATION: Pleural effusion. PROCEDURE: Informed consent obtained. Time out performed. The chest was prepped and draped in sterile fashion. 5  mL of 1 % lidocaine was infused into the local soft tissues. Under direct ultrasound guidance, a 5 Nigerian catheter system was placed into the pleural effusion. 50 mL of complex right pleural fluid were removed and sent to lab, if requested. Patient tolerated procedure well. Ultrasound imaging was obtained and placed in the patient's permanent medical record.     IMPRESSION: Status post right  ultrasound-guided thoracentesis. Very little fluid could be obtained because the fluid is complex. Reference CPT Code: 30309    XR Chest Port 1 View    Result Date: 1/13/2023  EXAM: XR CHEST PORT 1 VIEW LOCATION: [facility] DATE/TIME: 1/13/2023 5:53 PM INDICATION: SEPSIS BPA COMPARISON: 1/10/2023     IMPRESSION: Tracheostomy tube. Right-sided PICC line with tip over SVC. Right-sided central venous catheter with tip over right atrium. Small bilateral pleural effusions. Pulmonary vascular congestion. No pneumothorax. No acute osseous abnormality.    XR Chest Port 1 View    Result Date: 1/10/2023  EXAM: XR CHEST PORT 1 VIEW LOCATION: [facility] DATE/TIME: 1/10/2023 1:47 PM INDICATION: Sepsis. COMPARISON: 01/06/2023 and 01/04/2023.     IMPRESSION: Tracheostomy tube tip in good position. Right IJ central venous dialysis catheter tips in the upper right atrium. Moderate bibasilar pleural fluid and/or thickening and associated bibasilar segmental atelectasis unchanged. Mild generalized cardiac enlargement stable. Pulmonary vascularity within normal limits.         XR Chest Port 1 View    Result Date: 1/4/2023  EXAM: XR CHEST PORT 1 VIEW LOCATION: [facility]  HOSPITAL DATE/TIME: 1/4/2023 9:21 AM INDICATION: hypoxic resp failure; intermittent bloody trach secretions; orthopena; hx b l pleural effusions and b l PTX COMPARISON: Portable AP view the chest 12/27/2022; CT pulmonary angiogram 12/20/2022     IMPRESSION: Right PICC terminates in the middle third of the SVC. Tunneled right jugular approach dialysis catheter terminates in the right atrium. Tracheostomy resides within the tracheal air column. Persistent shallow lung inflation. Territories of bibasilar atelectasis are similar. Small right pleural effusion visible in the right lateral sulcus and layering into the interlobar fissures is similar. No enlarging pleural effusion. No pneumothorax. Unchanged heart and mediastinal contours. Unchanged oblique interface just lateral to the dialysis catheter/SVC and corresponds to rounded atelectasis on prior CT.    XR Chest Port 1 View    Result Date: 12/27/2022  XR CHEST PORT 1 VIEW   12/27/2022 3:48 PM HISTORY: Shortness of breath, mucous plug? COMPARISON: Chest radiograph 12/24/2022     IMPRESSION: Stable cardiomediastinal silhouette with unchanged position of right upper extremity PICC and dialysis catheter. Tracheostomy tube tip overlies the midthoracic trachea. Persistent bilateral pleural effusions with associated bibasilar atelectasis. No new airspace consolidation or lobar atelectasis. No pneumothorax. Bones are unchanged. Percutaneous gastrostomy tube partially visualized. ABEBA HER MD   SYSTEM ID:  [id number]    XR Chest Port 1 View    Result Date: 12/24/2022  EXAM: XR CHEST PORT 1 VIEW LOCATION: [facility] DATE/TIME: 12/24/2022 1:37 PM INDICATION: respiratory faliure COMPARISON: 12/21/2022     IMPRESSION: Tracheostomy, right IJ tunneled dialysis catheter and right arm PICC unchanged. Left basilar small bore chest tube has been removed.  Stable pulmonary edema, small bilateral pleural effusions, right greater than left and mild  bibasilar atelectasis. No pneumothorax. Normal heart size.    XR Chest Port 1 View    Result Date: 12/22/2022  EXAM: XR CHEST PORT 1 VIEW LOCATION: [facility] DATE/TIME: 12/22/2022 5:38 AM INDICATION: pneumothorax COMPARISON: 12/21/2022     IMPRESSION: Tracheostomy, right PICC line and right dialysis catheter. No visible pneumothorax. Right perihilar and bibasilar infiltrates. Small effusions.    XR Chest Port 1 View    Result Date: 12/21/2022  EXAM: XR CHEST PORT 1 VIEW LOCATION: [facility] DATE/TIME: 12/21/2022 4:48 AM INDICATION: pneumothorax COMPARISON: CT 12/20/2022     IMPRESSION: Tracheostomy tube. Cardiomegaly. Bilateral pleural effusions with adjacent consolidations. The previously seen bilateral pneumothoraces are not well visualized on this examination. Left-sided chest tube at the lung base. Right PICC terminates  in the mid SVC. Right hemodialysis catheter terminates in the right atrium.    MR Brain w/o & w Contrast    Result Date: 12/20/2022  MRI OF THE BRAIN WITHOUT AND WITH CONTRAST 12/20/2022 3:15 PM COMPARISON: Head CT 12/20/2022. HISTORY:  New onset seizure, focal deficits on exam (left upper extremity paresis). TECHNIQUE: Axial diffusion-weighted with ADC map, axial T2-weighted with fat saturation, axial T1-weighted, axial turboFLAIR and coronal T1-weighted images of the brain were acquired without intravenous contrast.  Following intravenous administration of gadolinium (9 mL Gadavist), axial T1-weighted images of the brain were acquired. FINDINGS: There are tiny recent ischemic infarcts in the left frontal lobe, left temporal lobe and posterior right frontal lobe consistent with embolic infarcts from a central embolic source. There is moderate diffuse cerebral volume loss. There are minimal patchy periventricular areas of abnormal T2 signal hyperintensity in the cerebral white matter bilaterally that are consistent with sequela of chronic  small vessel ischemic disease. A small area of chronic encephalomalacia at the high posterior right parietal lobe is again noted possibly due to old traumatic or ischemic injury. The ventricles and basal cisterns are within normal limits in configuration given the degree of cerebral volume loss.  There is no midline shift.  There are no extra-axial fluid collections.  There is no evidence for acute intracranial hemorrhage.  There is no abnormal contrast enhancement in the brain or its coverings. There is no sinusitis or mastoiditis.     IMPRESSION: 1. Scattered tiny recent ischemic infarcts in the cerebral hemispheres bilaterally consistent with embolic infarcts from a central embolic source. 2. Diffuse cerebral volume loss and cerebral white matter changes consistent with chronic small vessel ischemic disease. ARY COLINDRES MD   SYSTEM ID:  [id number]    CTA Neck with Contrast    Result Date: 1/7/2023  EXAM: CTA NECK WITH CONTRAST LOCATION: [facility] DATE/TIME: 1/6/2023 11:55 PM INDICATION: Tracheostomy in place, worsening bloody tracheal secretions, rule out tracheoinnominate fistula. COMPARISON: None. CONTRAST: Isovue 370 100 mL TECHNIQUE: Neck CT angiogram with IV contrast. Axial helical CT images of the neck vessels were obtained during the arterial phase of intravenous contrast administration. Axial helical 2D reconstructed images and multiplanar 3D MIP reconstructed images of the neck vessels were performed by the technologist. Dose reduction techniques were used. All stenosis measurements made according to NASCET criteria unless otherwise specified. FINDINGS: RIGHT CAROTID: Mild scattered plaque. No significant stenosis or dissection. Tortuous right ICA. LEFT CAROTID: Minimal plaque noted along the left common carotid artery. Moderate calcified plaque identified involving the left carotid bifurcation and left proximal ICA which causes less than 50% stenosis. No significant stenosis  or dissection identified. Tortuous ICA. VERTEBRAL ARTERIES: No focal stenosis or dissection. Dominant left and smaller right vertebral arteries. AORTIC ARCH: A tracheostomy tube is identified. No definite findings of a tracheoinnominate artery fistula identified. Two-vessel aortic arch anatomy. Minimal narrowing identified involving the origin of the left subclavian artery. No significant stenosis identified involving the origins of the great vessels. NONVASCULAR STRUCTURES: Unremarkable.     IMPRESSION: 1.  No significant stenosis or dissection. 2.  No definite findings to suggest a tracheoinnominate artery fistula.    XR Video Swallow with SLP or OT    Result Date: 1/13/2023  EXAM: XR VIDEO SWALLOW WITH SLP OR OT LOCATION: [facility] DATE/TIME: 1/13/2023 10:26 AM INDICATION: Difficulty swallowing. COMPARISON: None. TECHNIQUE: Routine swallow study with speech pathology using multiple barium thicknesses. FINDINGS: FLUOROSCOPIC TIME: 2.6 NUMBER OF IMAGES: 26 Swallow study with Speech Pathology using multiple barium thicknesses. Adequate oral phase. Delay in initiation of swallow reflux with pour over to the vallecular space during swallowing of moderately thick liquid, puree and crunchy solid consistency into the pyriform sinuses during swallowing of thin and mildly thick liquid. Complete epiglottic inversion was demonstrated. Deep laryngeal penetration and silent aspiration occurred during swallowing of thin liquid. Deep laryngeal penetration and aspiration occurred during swallowing of mildly thick liquid from an open cup. Shallow laryngeal penetration occurred during swallowing of mildly thick liquid from a spoon. No laryngeal penetration or aspiration was seen during swallowing of moderately thick liquid, puree or crunchy solid consistencies. Mild vallecular and pyriform stasis after swallowing of all consistencies.     IMPRESSION: 1.  Adequate oral phase. 2.  Delay in initiation of  swallow reflex with complete epiglottic inversion. 3.  Aspiration of thin and mildly thick liquid. 4.  Mild stasis. Please refer to speech therapy dysphasia evaluation report for additional information. Examination performed by Kacey Lopez RPA/ALBERTO, RT (R) under the general supervision of Dr. Fahrner .     EEG Video 2-12 HRS Ummonitored    Result Date: 12/23/2022  EEG Video 2-12 HRS Ummonitored Result VIDEO EEG DATE: 12/23/2022 VIDEO EEG LOG: [facility]  LT VIDEO EEG DAY#: 4 VIDEO EEG SOURCE FILE DURATION: 3 hours 56 minutes PATIENT INFORMATION: Blanco Osborne is a 57 year old year old male who presents with seizure-like activity and PEA arrest. Video EEG is being done to evaluate for seizures.   MEDICATIONS:  See MAR These medications and doses were derived from the medical record at the time of this procedure.   TECHNICAL SUMMAR TECHNICAL SUMMARY: This is a video-EEG monitoring study. EEG was recorded from 23 scalp electrodes placed according to the 10-20 international system. Additional electrodes were utilized for referencing, artifact detection, and recording from other cerebral regions. Qualified technicians attached EEG electrodes, set up the study, monitored and reviewed EEG recordings, and disconnected EEG electrodes when appropriate. Video was continuously recorded. Video was reviewed for clinical correlation and to assist with EEG interpretations.   BACKGROUND ACTIVITY: Background activity consisted of diffuse generalized delta slowing with superimposed alpha activity in the midline and parasagittal chain.  Well-formed parieto-occipital rhythm is rarely noted up to 6-7 hz. Well formed sleep architecture is not appreciated.   ACTIVATION PROCEDURE: EEG was reactive   INTERICTAL EPILEPTIFORM DISCHARGES: No epileptiform discharges   ICTAL: No EEG seizures.  Video was reviewed intermittently by EEG technologist and physician for clinical seizures.   IMPRESSION OF VIDEO EEG DAY # 4: Video EEG day 4 is  abnormal due to presence of generalized delta theta slowing consistent with a moderate encephalopathy.   No electrographic seizure, paroxysmal behaviors, or epileptiform discharges were seen. Clinical correlation is advised.     SUMMARY OF FOUR DAYS OF VIDEO EEG: Four day of Video EEG is abnormal due to presence of generalized delta theta slowing consistent with a moderate to severe encephalopathy. On Video EEG day 3 and 4 he had posterior dominant rhythm 6-7 hz. Patient had 2 electroclinical seizure (day 1 and 2) .  Clinically he had gaze deviation to the right, head turn to the right, facial twitching, that progresses to shoulder twitching on the right side, head jerks, then whole body low amplitude twitching.  Electrographically at onset of seizure on Video EEG day 1 there was an impedance check and we are not able to localize seizure onset. Day 2 seizure had left occipital focal onset.  Sandra Beltran MD EPILEPSY STAFF     EEG Video 12-26 hr Unmonitored    Result Date: 12/23/2022  EEG Video 12-26 hr Unmonitored Result VIDEO EEG DATE: 12/23/22 VIDEO EEG LOG: [id number] VIDEO EEG DAY#: 3 VIDEO EEG SOURCE FILE DURATION: 23 hours 52 minutes PATIENT INFORMATION: Blanco Osborne is a 57 year old year old male who presents with seizure-like activity and PEA arrest. Video EEG is being done to evaluate for seizures.   MEDICATIONS:  See MAR These medications and doses were derived from the medical record at the time of this procedure.   TECHNICAL SUMMAR TECHNICAL SUMMARY: This is a video-EEG monitoring study. EEG was recorded from 23 scalp electrodes placed according to the 10-20 international system. Additional electrodes were utilized for referencing, artifact detection, and recording from other cerebral regions. Qualified technicians attached EEG electrodes, set up the study, monitored and reviewed EEG recordings, and disconnected EEG electrodes when appropriate. Video was continuously recorded. Video was reviewed for  clinical correlation and to assist with EEG interpretations.   BACKGROUND ACTIVITY: Background activity consisted of diffuse generalized delta slowing with superimposed alpha activity in the midline and parasagittal chain.  Well-formed parieto-occipital rhythm is rarely noted up to 6 hz. Well formed sleep architecture is not appreciated.   ACTIVATION PROCEDURE: EEG was reactive   INTERICTAL EPILEPTIFORM DISCHARGES: No epileptiform discharges   ICTAL: No EEG seizures.  Video was reviewed intermittently by EEG technologist and physician for clinical seizures.   IMPRESSION OF VIDEO EEG DAY #3: Video EEG day 3 is abnormal due to presence of generalized delta theta slowing consistent with a moderate encephalopathy.   No electrographic seizure, paroxysmal behaviors, or epileptiform discharges were seen. Clinical correlation is advised. Sandra Beltran MD EPILEPSY STAFF     EEG Video 12-26 hr Unmonitored    Result Date: 12/23/2022  EEG Video 12-26 hr Unmonitored Result VIDEO EEG DATE: 12/22/22 VIDEO EEG LOG: [facility]  [facility] VIDEO EEG DAY#: 2 VIDEO EEG SOURCE FILE DURATION: 23 HOURS 53 MINUTES PATIENT INFORMATION: Blanco Osborne is a 57 year old year old male who presents with seizure-like activity and PEA arrest. Video EEG is being done to evaluate for seizures.   MEDICATIONS:  See MAR These medications and doses were derived from the medical record at the time of this procedure.   TECHNICAL SUMMAR TECHNICAL SUMMARY: This is a video-EEG monitoring study. EEG was recorded from 23 scalp electrodes placed according to the 10-20 international system. Additional electrodes were utilized for referencing, artifact detection, and recording from other cerebral regions. Qualified technicians attached EEG electrodes, set up the study, monitored and reviewed EEG recordings, and disconnected EEG electrodes when appropriate. Video was continuously recorded. Video was reviewed for clinical correlation and to assist with EEG  interpretations.   BACKGROUND ACTIVITY: Background activity consisted of diffuse generalized delta slowing with superimposed alpha activity in the midline and parasagittal chain.  Well-formed parieto-occipital rhythm or sleep architecture is not appreciated.   ACTIVATION PROCEDURE: EEG was reactive   INTERICTAL EPILEPTIFORM DISCHARGES: No epileptiform discharges   ICTAL: Patient had 1 electroclinical seizure with onset in left occipital region.  Clinically he opens eyes at 11:18 had gaze deviation to the right then head turn to the right.  He grimaces 11:21 and stiffens with head jerks and right should jerks at 11:21:38. This progress to low amplitude body jerks then whole body low amplitude twitching.  Electrographically at onset (11:16:59) there is ictal pattern in the posterior quadrant with maximum negativity was concentrated in electrode 01, this progresses to temporal parietal region then diffuse ictal pattern consisting of rhythmic delta activity that evolves in amplitude and frequency over time.  EEG seizure ends at 12:22:48. Seizure duration was around 4-5 minutes. Video was reviewed intermittently by EEG technologist and physician for clinical seizures.   IMPRESSION OF VIDEO EEG DAY # 2: Video EEG day 2 is abnormal due to presence of generalized delta theta slowing consistent with a severe encephalopathy.  Patient had 1 electroclinical seizure involving tonic-clonic movements of face, right shoulder that progressed to the whole body twitching with EEG seizure with focal onset at 01 (left occipital) that secondarily generalized. Prior to electroclinical seizure there were no epileptiform discharges identified on file.  Clinical correlation is advised.   Sandra Beltran MD Epilepsy Staff     EEG Video 12-26 hr Unmonitored    Result Date: 12/21/2022  EEG Video 12-26 hr Unmonitored Result VIDEO EEG DATE: 12/21/22 VIDEO EEG LOG: [facility] VIDEO EEG DAY#: 1 VIDEO EEG SOURCE FILE DURATION: 23 HOURS 48 MINUTES  PATIENT INFORMATION: Blanco Osborne is a 57 year old year old male who presents with seizure-like activity and PEA arrest. Video EEG is being done to evaluate for seizures. MEDICATIONS:  See MAR These medications and doses were derived from the medical record at the time of this procedure. TECHNICAL SUMMAR TECHNICAL SUMMARY: This is a video-EEG monitoring study. EEG was recorded from 23 scalp electrodes placed according to the 10-20 international system. Additional electrodes were utilized for referencing, artifact detection, and recording from other cerebral regions. Qualified technicians attached EEG electrodes, set up the study, monitored and reviewed EEG recordings, and disconnected EEG electrodes when appropriate. Video was continuously recorded. Video was reviewed for clinical correlation and to assist with EEG interpretations. BACKGROUND ACTIVITY: Background activity consisted of diffuse generalized delta slowing with superimposed alpha activity in the midline and parasagittal chain.  Well-formed parieto-occipital rhythm or sleep architecture is not appreciated. ACTIVATION PROCEDURE: EEG was reactive INTERICTAL EPILEPTIFORM DISCHARGES: No epileptiform discharges ICTAL: Patient had 1 electroclinical seizure.  Clinically he had gaze deviation to the right, head turn to the right at 09: 50: 54.  Staff is working with patient during seizure there is facial twitching, that progresses to shoulder twitching on the right side at 09: 51: 33 and more pronounced head jerks.  Then there is whole body low amplitude twitching.  Electrographically at onset of seizure impedance checks were being performed and not able to localize seizure onset.  There is a diffuse ictal pattern consisting of rhythmic delta activity that involves in amplitude and frequency over time.  Video was reviewed intermittently by EEG technologist and physician for clinical seizures. IMPRESSION OF VIDEO EEG DAY # 1: Video EEG day 1 is abnormal due to  presence of generalized delta theta slowing consistent with a severe encephalopathy.  Patient had 1 electroclinical seizure involving clonic movements of face, right shoulder that progressed to the whole body twitching.  This event appears to be generalized tonic-clonic seizure.  Electrographically there is diffuse rhythmic delta activity that evolves over time during seizure.  Prior to electroclinical seizure there were no epileptiform discharges identified on file.  Clinical correlation is advised.   Sandra Beltran MD Epilepsy Staff      Attestation:  Total time on the floor involved in the patient's care: 55 minutes. Total time spent in counseling/care coordination: >50%

## 2023-01-19 NOTE — SIGNIFICANT EVENT
Significant Event Note    Time of event: 8:35 PM January 18, 2023    Description of event:  CT abdomen today shows: Percutaneous gastrostomy tube balloon is now within the lumen and no longer closely apposed to the gastric wall, suggesting interval displacement.      Plan:  Not clear whether this is position dependent. Will continue bolus feed 250 ml tonight. If leakage or obstruction are observed, then stop the bolus feeding.    Discussed with: bedside nurse      Emily Ramirez MD  Cross-covering hospitalist

## 2023-01-19 NOTE — PLAN OF CARE
Problem: Plan of Care - These are the overarching goals to be used throughout the patient stay.    Goal: Absence of Hospital-Acquired Illness or Injury  Outcome: Progressing  Intervention: Identify and Manage Fall Risk  Recent Flowsheet Documentation  Taken 1/19/2023 0105 by Noemi Vidal RN  Safety Promotion/Fall Prevention:   activity supervised   room door open   room near nurse's station   bed alarm on   lighting adjusted  Intervention: Prevent Infection  Recent Flowsheet Documentation  Taken 1/19/2023 0105 by Noemi Vidal RN  Infection Prevention:   hand hygiene promoted   rest/sleep promoted     Problem: Plan of Care - These are the overarching goals to be used throughout the patient stay.    Goal: Absence of Hospital-Acquired Illness or Injury  Intervention: Identify and Manage Fall Risk  Recent Flowsheet Documentation  Taken 1/19/2023 0105 by Noemi Vidal RN  Safety Promotion/Fall Prevention:   activity supervised   room door open   room near nurse's station   bed alarm on   lighting adjusted     Problem: Plan of Care - These are the overarching goals to be used throughout the patient stay.    Goal: Absence of Hospital-Acquired Illness or Injury  Intervention: Prevent Infection  Recent Flowsheet Documentation  Taken 1/19/2023 0105 by Noemi Vidal RN  Infection Prevention:   hand hygiene promoted   rest/sleep promoted     Problem: Malnutrition  Goal: Improved Nutritional Intake  Outcome: Progressing     Problem: Chronic Kidney Disease  Goal: Acceptable Pain Control  Outcome: Progressing  Intervention: Prevent or Manage Pain  Recent Flowsheet Documentation  Taken 1/18/2023 2300 by Noemi Vidal RN  Pain Management Interventions: (Tylenol 650 mg) medication (see MAR)   Goal Outcome Evaluation:       Pt alert and oriented. Slept on and off most of the night.No respiratory distress noted. Lactic acid came normal. BS checked in the morning 73 mg/dl. Hgb  POCT 7.7 with Kb checked but from lab Hgb 5.9. Changed rectal tube bag. Will cont to monitor      Noemi Jerez RN

## 2023-01-19 NOTE — PROGRESS NOTES
RT PROGRESS NOTE     DATA:     CURRENT SETTINGS:             TRACH TYPE / SIZE:  #7 Biv[place] (placed 1/9)             MODE:   Capped             FIO2:   RA     ACTION:             THERAPIES:   Albuterol/Aerobika QID, Mucomyst BID             SUCTION:                           FREQUENCY:   x1                        AMOUNT:   Scant                        CONSISTENCY:   Thick/Thin                        COLOR:   Pale Yellow             SPONTANEOUS COUGH EFFORT/STRENGTH OF EFFORT (not elicited by suctioning): Weak Spontaneous Cough                           WEANING PHASE:   Phase 3                        WEAN MODE:    Capped RA                        WEAN TIME:   Continuous                        END WEAN REASON:   NA     RESPONSE:             BS:   Clear/Diminished             VITAL SIGNS:   Sating 94-99%, HR , RR 22-24             EMOTIONAL NEEDS / CONCERNS:  NA                RISK FOR SELF DECANNULATION:  No     NOTE / PLAN:   Going to [facility] tomorrow for a chest tube and paracentesis, pickup at 11am.  [facility] charge RT was notified.  RT will continue to monitor.

## 2023-01-19 NOTE — PROGRESS NOTES
Pulmonary Progress Note    Admit Date: 12/29/2022  CODE: Full Code    Assessment/Plan:   57yoM with liver transplant(2016), recent prolonged hospitalization 11/12-12/15 for PEA cardiac arrest, Strep/Parainfluenza pneumonia with ARDS, MODS, ?aspergillus pna s/p 1 week of voriconazole, right PTX requiring chest tube since removed.  Now HD dependent, s/p trach/PEG.  Course further c/b new left PTX requiring chest tube(since removed), b/l pleural effusions, ascites, mucus plugging, PEA arrest and seizure on 12/20, pleural fluid cxs growing candida parapsilosis & ascites cx growing lactobacillus on antimicrobials, and CSF panel positive for HHV6 ultimately decided to not treat.  Admitted to LT[facility] 12/29.      Discussion of pertinent problems:  Acute hypoxic/hypercapnic respiratory failure s/p trach: Liberated from the vent on 1/9 with acceptable VBG on trach dome.  Now tolerating continuous trach capping on room air.  Still requires deep tracheal suctioning for weak cough.     Bilateral pleural effusions s/p left thora 11/26.  S/p right thora 1/13 - only 50cc removed d/t complex fluid; exudative; pleural LDH very high, very low glucose, pH 7.50, high cell count w high neutrophils.  This would qualify as a complicated parapneumonic effusion vs empyema.  Cytology neg for malignancy; shows acute inflammation, cultures with NGTD thus far.  CT(1/18) shows pleural enhancement to b/l loculated effusions consistent with infection.  Procal stable/downtrending, CRP markedly elevated.  Afebrile w/o leukocytosis        Tracheostomy in place: 8 Shiley placed 11/29.  Changed to 6 Shiley prox xlt 12/8, unclear why.  Downsized to 7 Bivona 1/9 without issue.      Dysphagia s/p PEG tube.  BDT 12/30 with delayed aspiration.  Tolerating PMV trials well after trach downsize.  Repeat BDT 1/10 with no immediate and faint delayed aspiration.  Aspiration on VFSS 1/13 ->started soft/bite sized w/ moderately thick liquids     Candida in left pleural  "fluid cultures: started on Micafungin switched to fluconazole 1/3 based on cx sensitivities.  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  May need TIPS in the future    Lactobacillus peritonitis: Completed course of Unasyn transitioned to Augmentin, ended 1/11.  CT C/A/P(1/18) shows \"Large volume ascites with thin peritoneal enhancement consistent with bacterial peritonitis.\"     ?CHRISTIAN: did not use home CPAP/BiPAP per patient.  No desats last night while capped on room air     Seizure after hypoxic event on 12/20 with subsequent seizure 12/21 during dialysis:   - Keppra and vimpat indefinitely     Multifocal acute ischemic strokes on MRI(12/20).      Hx b/l pneumothoraces requiring b/l chest tubes, both since removed.  Stable on recent imaging     Hx mucus plugging of bronchi: stable on current regimen: secretion burden improving     Acute on chronic Anemia: s/p multiple blood transfusions, last on 1/13.  Apixaban on hold since 1/4.  Initially with bloody secretions which have since resolved.  Apixaban restarted 1/17, placed back on hold 1/18 in prep for chest tube placement.      Plan:   - Phase 3 weans: capping 24/7.  Not ready for decannulation yet, still too weak, but slowly improving  - follow pleural fluid cultures - NGTD thus far on cultures  - ID to weigh in on antibiotics for both his recurrent SBP and complicated parapneumonic effusion(s)   - Discussed with Pulmonary team at [facility] and [facility] who are in agreement with right chest tube and lytics +/- surgery consult.  Unable to safely administer lytics here after discussion with charge RN and hospitalist and our service is not here on the weekends.  Recommend transfer out; ideally back to [facility] if bed available.     - " Bronchial hygiene with Albuterol + flutter valve QID  + mucomyst nebs BID + IS  - Fluconazole at least until 1/23: 4 week course for candida in pleural fluid - ID helping manage    Addendum 1215: notified by hospitalist that there are no beds in the system currently: in preparation, [facility] [place], I will place IR consult for CT placement and will need to start abx today with ID guidance     Henry Fenton, CNP  Pulmonary Medicine  [facility]  Pager [phone]    Subjective/Interim Events:   - denies sob, n/v, fever, chills.  Right sided abdominal pain suspect from large ascites  - no overnight events  - tolerated capping well  - Hgb this morning low 5.9, recheck 8.1    Tracheal secretions:  Overnight - x2, sm/lg, thick  Yesterday - none    Cough strength: weak/moderate    Current phase of ventilator weaning pathway:  Phase 3    Ventilator weaning results: continuous TM since 1/10    1/16: TM/PMV continuous, capped 1hr 25min   1/17: capped for 12/5 hr, TM/PMV night   11/18: continuous capping     Clinical status discussed today with respiratory therapist, patient, wife, brother Dylan, hospitalist, Dr. Clemons from Pulmonary medicine     Medications:       dextrose       - MEDICATION INSTRUCTIONS -         sodium chloride 0.9%  300 mL Hemodialysis Machine During Dialysis/CRRT (from stock)     acetaminophen  975 mg Per Feeding Tube Q8H BIJU    Or     acetaminophen  650 mg Rectal Q8H BIJU     acetylcysteine  2 mL Nebulization BID     albuterol  2.5 mg Nebulization 4x daily     [Held by provider] apixaban ANTICOAGULANT  5 mg Oral or Feeding Tube BID     carboxymethylcellulose PF  1 drop Both Eyes TID     chlorhexidine  15 mL Mouth/Throat BID     epoetin mirta-epbx  40,000 Units Subcutaneous Weekly     fluconazole  200 mg Oral or Feeding Tube At Bedtime     folic acid  1 mg Oral or Feeding Tube Daily     heparin Lock (1000 units/mL High concentration)  1,900 Units Intracatheter During Dialysis/CRRT  (from stock)     heparin Lock (1000 units/mL High concentration)  1,900 Units Intracatheter During Dialysis/CRRT (from stock)     insulin aspart  1-6 Units Subcutaneous Daily     insulin glargine  5 Units Subcutaneous Daily     lacosamide  100 mg Oral or Feeding Tube BID     lactulose  20 g Oral or Feeding Tube BID     levETIRAcetam  1,000 mg Oral or Feeding Tube Q24H     Melatonin  6 mg Oral or NG Tube At Bedtime     midodrine  10 mg Oral During Dialysis/CRRT (from stock)     midodrine  5 mg Oral or Feeding Tube TID w/meals     mineral oil-hydrophilic petrolatum   Topical Daily     multivitamin RENAL  1 tablet Per Feeding Tube Daily     nystatin  500,000 Units Swish & Swallow 4x Daily     pantoprazole  40 mg Per Feeding Tube BID     QUEtiapine  75 mg Oral or Feeding Tube At Bedtime     rifaximin  550 mg Oral or Feeding Tube BID     sodium chloride (PF)  10-30 mL Intracatheter Q8H     tacrolimus  1 mg Oral or Feeding Tube BID         Exam/Data:   Vitals  BP 94/57 (BP Location: Left arm, Patient Position: Left side)   Pulse 100   Temp 97.7  F (36.5  C) (Oral)   Resp 20   Wt 84.5 kg (186 lb 3.2 oz)   SpO2 92%   BMI 25.25 kg/m       I/O last 3 completed shifts:  In: 865 [P.O.:480; I.V.:60; NG/GT:325]  Out: 1150 [Other:1000; Stool:150]  Weight change: 1.179 kg (2 lb 9.6 oz)    Vent Mode: Trach collar  FiO2 (%): 21 %  Resp: 20      EXAM:  Gen: no distress lying in bed, fatigued    HEENT: NT, trach midline/intact, no stridor, weak voice   CV: RRR   Resp: CTAB anteriorly/posteriorly, diminished b/l lower lobes; non-labored   Abd: large, ascites,  soft, tender more on right side, BS hypoactive  Skin: no visible rashes or lesions, scattered ecchymosis(improved), fragile   Ext: no edema, weak  Neuro: alert, follows commands    ROS:  A 10-system review was obtained and is negative with the exception of the symptoms noted above.    Labs:  Basic Metabolic Panel  Recent Labs   Lab 01/19/23  0631 01/19/23  0628  01/19/23  0612 01/18/23  0822 01/17/23  1903 01/17/23  0952 01/17/23  0555 01/16/23  0635 01/14/23  0759 01/14/23  0615   * 134*  --   --   --  136  --  133*  --  134*   POTASSIUM 3.6 3.7  --   --   --  3.6  --  4.0  --  4.1   CHLORIDE  --  96*  --   --   --   --   --  94*  --  97*   CO2  --  28  --   --   --   --   --  27  --  28   BUN  --  26.3*  --   --   --   --   --  54.7*  --  34.1*   CR  --  2.21*  --   --   --   --   --  3.18*  --  1.94*   GLC 71 73 73 80   < > 104   < > 96   < > 78    < > = values in this interval not displayed.     CBC RESULTS: Recent Labs   Lab Test 01/19/23  1034 01/19/23  0631 01/19/23  0627   WBC  --   --  7.2   RBC  --   --  2.00*   HGB 8.1*   < > 5.9*   HCT  --   --  19.8*   MCV  --   --  99   MCH  --   --  29.5   MCHC  --   --  29.8*   RDW  --   --  18.8*   PLT  --   --  238    < > = values in this interval not displayed.      Venous Blood Gas  Recent Labs   Lab 01/19/23  0631 01/17/23  0952 01/13/23  1753 01/12/23  2245   PHV 7.44 7.39 7.41 7.41   PCO2V 46 50 52* 47   PO2V 36 34 29 33   HCO3V 30 28 30 28   MITA 7.0* 5.0* 7.7* 5.5*      CRP Inflammation   Date Value Ref Range Status   01/19/2023 133.90 (H) <5.00 mg/L Final   11/22/2022 22.3 (H) 0.0 - 8.0 mg/L Final     Procal  1/19: 1.64  1/13: 1.81  1/10: 1.99    RADIOLOGY: Personally reviewed; radiology read below   CT CHEST/ABDOMEN/PELVIS W CONTRAST  LOCATION: [facility]  DATE/TIME: 1/18/2023 1:23 PM     INDICATION: Follow up pleural effusion, ascites  COMPARISON: 01/06/2023  TECHNIQUE: CT scan of the chest, abdomen, and pelvis was performed following injection of IV contrast. Multiplanar reformats were obtained. Dose reduction techniques were used.   CONTRAST: IsoVue 370 100mL     FINDINGS:   LUNGS AND PLEURA: Small bilateral loculated pleural effusions with thin pleural enhancement, slightly decreased compared to 01/06/2023. Airspace opacities at both lung bases are unchanged and likely compressive  atelectasis. Decreased presumed hematoma in   the right lung. Tracheostomy in good position.     MEDIASTINUM/AXILLAE: No lymphadenopathy. No thoracic aortic aneurysms. Right neck and right upper terminate central venous catheters terminate in the right atrium and lower SVC, respectively. No central pulmonary emboli.     CORONARY ARTERY CALCIFICATION: Mild.     HEPATOBILIARY: Liver transplant. No biliary dilation. Uniform parenchymal enhancement.      PANCREAS: Normal.     SPLEEN: Enlarged, 18.3 cm craniocaudal, previously 18.1 cm     ADRENAL GLANDS: Normal.     KIDNEYS/BLADDER: Unchanged nonobstructing calculus in the upper pole right kidney. The bladder is completely collapsed and poorly evaluated. No renal collecting system dilation.     BOWEL: Percutaneous gastrostomy tube with the balloon now in the lumen and no longer closely apposed to the gastric wall. No evidence of acute inflammation or obstruction. Rectal tube in situ.     PERITONEUM: Large volume ascites within the peritoneal enhancement, as well as peritoneal and mesenteric fat stranding.     LYMPH NODES: Normal.     VASCULATURE: Moderate diffuse atherosclerotic calcification of the abdominal aorta and iliofemoral arteries. Extensive collaterals throughout the omentum in the upper abdomen. Portomesenteric veins are patent. Hepatic veins not well opacified probably   due to timing.     PELVIC ORGANS: Normal.     MUSCULOSKELETAL: Tiny fat-containing supraumbilical ventral hernia. Diffuse muscular atrophy. No aggressive or destructive osseous lesions. Degenerative changes in the spine. Unchanged mild compression deformities of T10 and L1.                                                                      IMPRESSION:  1.  Small bilateral partially loculated pleural effusions with associated pleural enhancement consistent with infected effusions. Size mildly decreased compared to 01/06/2023.     2.  Large volume ascites with thin peritoneal enhancement  consistent with bacterial peritonitis. Volume of ascites is similar to the prior CT. Mesenteric and omental fat stranding is nonspecific, but most likely related to underlying infection and portal   hypertension.     3.  Percutaneous gastrostomy tube balloon is now within the lumen and no longer closely apposed to the gastric wall, suggesting interval displacement. Correlate for malfunction.  -------------------  CTA CHEST ABDOMEN PELVIS W CONTRAST 1/6/2023                                                                   IMPRESSION:  1. No obvious active bleeding identified.  2. Large amount of abdominal/pelvic ascites, splenomegaly, and scattered varicosities. The above findings would be most typical for sequelae of portal venous hypertension.  3. Significant stranding of the omental fat and intraperitoneal fat, this is nonspecific, but may be reactive or neoplastic in nature.  4. Percutaneous gastrostomy tube, tracheostomy, and the rectal tube appear to be in good position.  5. Central catheter with tip in the inferior right atrium.  6. Mild to moderate right pleural effusion and mild left pleural effusion, both of these appear to be loculated given the peripheral enhancement. Presumed sequelae of prior hematoma seen in the right lung.  7. Minute amount of gas seen associated with the left pleural effusion and this is presumed to be iatrogenic in nature.  8. Slight filling defect within the lower trachea most typical for slight mucous.  9. Enlargement of the main pulmonary artery presumed to represent sequelae of pulmonary arterial hypertension.   ----------------  CTA NECK WITH CONTRAST 1/6/2023     INDICATION: Tracheostomy in place, worsening bloody tracheal secretions, rule out tracheoinnominate fistula.                                                                   IMPRESSION:   1.  No significant stenosis or dissection.  2.  No definite findings to suggest a tracheoinnominate artery  fistula.  ----------------------  XR CHEST  1/4/2023                                                                   IMPRESSION:      Right PICC terminates in the middle third of the SVC. Tunneled right jugular approach dialysis catheter terminates in the right atrium. Tracheostomy resides within the tracheal air column.     Persistent shallow lung inflation. Territories of bibasilar atelectasis are similar. Small right pleural effusion visible in the right lateral sulcus and layering into the interlobar fissures is similar. No enlarging pleural effusion. No pneumothorax.     Unchanged heart and mediastinal contours. Unchanged oblique interface just lateral to the dialysis catheter/SVC and corresponds to rounded atelectasis on prior CT.

## 2023-01-19 NOTE — PROGRESS NOTES
HD Progress Note    Assessment: Pt AAO x4, denied c/o, trach capped, breathing on his own, room air. No edema noted.    Vascular Access: RIJ catheter patent, aspirated and flushed well, however art collapsing at initiation of tx and lines reversed. -400 with occ positional collapse. Dressing loose, coming off. Exit site cleaned and new dressing applied. No s/s of infection. Ports locked with Heparin post Tx.    Dialyzer/Rinse: 160NRe / clear    HD time: 3hrs    HD fluid removal goal: 1kg or less    Treatment:Pt hemodynamically stable during run. SBP in 100's. Stated he was cramping after last dialysis. Goal at 1 kg per Jie Arora. No c/o cramping today.     Fluid Removed Total: 1000 ml               Net:  600 ml    Interventions: VS monitoring q 15 min and as needed.    Plan: HD q MWF

## 2023-01-19 NOTE — PROGRESS NOTES
Patient was started on dialysis at the start of shift, tolerating same okay-was put on cardiac monitoring for this -initial rhythm was normal sinus, patient had tylenol at 1807 for abdominal pain (8/10), brother is visiting with patient in room at this time. Patient had CT of chest, abdomen/pelvis done (please review the results), Eliquis in on hold per provider for chest tube placement

## 2023-01-19 NOTE — PROGRESS NOTES
Writer spoke with the House Doctor to see whether it is ok to give the pt's bolus gt feeding. The Doctor was informed that pt's abdominal CT today indicated that the balloon is in the lumen and no longer closely apposed to the gastric wall. The Doctor said it is ok to give the bolus tube feeding but when any leakage is observed during the administration, it should be stopped. Pt appears to be doing ok at the moment. Will continue to closely monitor. Kb Olivera RN.

## 2023-01-20 ENCOUNTER — APPOINTMENT (OUTPATIENT)
Dept: OCCUPATIONAL THERAPY | Facility: CLINIC | Age: 58
DRG: 207 | End: 2023-01-20
Attending: HOSPITALIST
Payer: COMMERCIAL

## 2023-01-20 ENCOUNTER — APPOINTMENT (OUTPATIENT)
Dept: SPEECH THERAPY | Facility: CLINIC | Age: 58
DRG: 207 | End: 2023-01-20
Attending: HOSPITALIST
Payer: COMMERCIAL

## 2023-01-20 LAB
BACTERIA FLD CULT: NORMAL
BACTERIA PLR CULT: NORMAL
GLUCOSE BLDC GLUCOMTR-MCNC: 85 MG/DL (ref 70–99)
PHOSPHATE SERPL-MCNC: 3.9 MG/DL (ref 2.5–4.5)

## 2023-01-20 PROCEDURE — 250N000012 HC RX MED GY IP 250 OP 636 PS 637: Performed by: HOSPITALIST

## 2023-01-20 PROCEDURE — 250N000013 HC RX MED GY IP 250 OP 250 PS 637: Performed by: HOSPITALIST

## 2023-01-20 PROCEDURE — 99233 SBSQ HOSP IP/OBS HIGH 50: CPT | Performed by: INTERNAL MEDICINE

## 2023-01-20 PROCEDURE — 84100 ASSAY OF PHOSPHORUS: CPT | Performed by: FAMILY MEDICINE

## 2023-01-20 PROCEDURE — 92526 ORAL FUNCTION THERAPY: CPT | Mod: GN | Performed by: SPEECH-LANGUAGE PATHOLOGIST

## 2023-01-20 PROCEDURE — 94640 AIRWAY INHALATION TREATMENT: CPT

## 2023-01-20 PROCEDURE — 99233 SBSQ HOSP IP/OBS HIGH 50: CPT | Performed by: FAMILY MEDICINE

## 2023-01-20 PROCEDURE — 90935 HEMODIALYSIS ONE EVALUATION: CPT

## 2023-01-20 PROCEDURE — 250N000013 HC RX MED GY IP 250 OP 250 PS 637: Performed by: NURSE PRACTITIONER

## 2023-01-20 PROCEDURE — 999N000009 HC STATISTIC AIRWAY CARE

## 2023-01-20 PROCEDURE — 999N000123 HC STATISTIC OXYGEN O2DAILY TECH TIME

## 2023-01-20 PROCEDURE — 120N000017 HC R&B RESPIRATORY CARE

## 2023-01-20 PROCEDURE — 250N000011 HC RX IP 250 OP 636: Performed by: NURSE PRACTITIONER

## 2023-01-20 PROCEDURE — 250N000009 HC RX 250: Performed by: HOSPITALIST

## 2023-01-20 PROCEDURE — 250N000009 HC RX 250: Performed by: NURSE PRACTITIONER

## 2023-01-20 PROCEDURE — 99232 SBSQ HOSP IP/OBS MODERATE 35: CPT | Performed by: NURSE PRACTITIONER

## 2023-01-20 PROCEDURE — 99232 SBSQ HOSP IP/OBS MODERATE 35: CPT

## 2023-01-20 PROCEDURE — 97110 THERAPEUTIC EXERCISES: CPT | Mod: GO | Performed by: OCCUPATIONAL THERAPIST

## 2023-01-20 PROCEDURE — 999N000157 HC STATISTIC RCP TIME EA 10 MIN

## 2023-01-20 RX ADMIN — Medication 6 MG: at 21:32

## 2023-01-20 RX ADMIN — LACOSAMIDE 100 MG: 10 SOLUTION ORAL at 09:06

## 2023-01-20 RX ADMIN — Medication 1 DROP: at 13:44

## 2023-01-20 RX ADMIN — ACETAMINOPHEN 650 MG: 325 TABLET, FILM COATED ORAL at 18:18

## 2023-01-20 RX ADMIN — Medication 40 MG: at 21:33

## 2023-01-20 RX ADMIN — NYSTATIN 500000 UNITS: 100000 SUSPENSION ORAL at 21:33

## 2023-01-20 RX ADMIN — LACOSAMIDE 100 MG: 10 SOLUTION ORAL at 21:32

## 2023-01-20 RX ADMIN — ACETAMINOPHEN 975 MG: 325 TABLET, FILM COATED ORAL at 05:52

## 2023-01-20 RX ADMIN — Medication 40 MG: at 09:07

## 2023-01-20 RX ADMIN — ALBUTEROL SULFATE 2.5 MG: 2.5 SOLUTION RESPIRATORY (INHALATION) at 07:37

## 2023-01-20 RX ADMIN — QUETIAPINE 75 MG: 25 TABLET ORAL at 21:32

## 2023-01-20 RX ADMIN — LEVETIRACETAM 1000 MG: 100 SOLUTION ORAL at 21:33

## 2023-01-20 RX ADMIN — CHLORHEXIDINE GLUCONATE 0.12% ORAL RINSE 15 ML: 1.2 LIQUID ORAL at 21:33

## 2023-01-20 RX ADMIN — ACETAMINOPHEN 975 MG: 325 TABLET, FILM COATED ORAL at 21:31

## 2023-01-20 RX ADMIN — TACROLIMUS 1 MG: 5 CAPSULE ORAL at 18:18

## 2023-01-20 RX ADMIN — LACTULOSE 20 G: 20 SOLUTION ORAL at 09:06

## 2023-01-20 RX ADMIN — NYSTATIN 500000 UNITS: 100000 SUSPENSION ORAL at 17:02

## 2023-01-20 RX ADMIN — ACETAMINOPHEN 975 MG: 325 TABLET, FILM COATED ORAL at 13:43

## 2023-01-20 RX ADMIN — NYSTATIN 500000 UNITS: 100000 SUSPENSION ORAL at 09:07

## 2023-01-20 RX ADMIN — Medication 1 TABLET: at 09:07

## 2023-01-20 RX ADMIN — FOLIC ACID 1 MG: 1 TABLET ORAL at 09:07

## 2023-01-20 RX ADMIN — Medication 1 DROP: at 21:32

## 2023-01-20 RX ADMIN — LACTULOSE 20 G: 20 SOLUTION ORAL at 21:32

## 2023-01-20 RX ADMIN — FLUCONAZOLE 200 MG: 200 TABLET ORAL at 21:32

## 2023-01-20 RX ADMIN — TACROLIMUS 1 MG: 5 CAPSULE ORAL at 09:06

## 2023-01-20 RX ADMIN — MIDODRINE HYDROCHLORIDE 5 MG: 5 TABLET ORAL at 17:02

## 2023-01-20 RX ADMIN — MIDODRINE HYDROCHLORIDE 5 MG: 5 TABLET ORAL at 09:07

## 2023-01-20 RX ADMIN — ACETYLCYSTEINE 2 ML: 200 SOLUTION ORAL; RESPIRATORY (INHALATION) at 07:37

## 2023-01-20 RX ADMIN — Medication 1 DROP: at 09:07

## 2023-01-20 RX ADMIN — RIFAXIMIN 550 MG: 550 TABLET ORAL at 21:32

## 2023-01-20 RX ADMIN — HEPARIN SODIUM 1900 UNITS: 1000 INJECTION INTRAVENOUS; SUBCUTANEOUS at 17:47

## 2023-01-20 RX ADMIN — MICONAZOLE NITRATE ANTIFUNGAL POWDER: 2 POWDER TOPICAL at 09:06

## 2023-01-20 RX ADMIN — ANORECTAL OINTMENT: 15.7; .44; 24; 20.6 OINTMENT TOPICAL at 09:06

## 2023-01-20 RX ADMIN — RIFAXIMIN 550 MG: 550 TABLET ORAL at 09:07

## 2023-01-20 RX ADMIN — CHLORHEXIDINE GLUCONATE 0.12% ORAL RINSE 15 ML: 1.2 LIQUID ORAL at 09:07

## 2023-01-20 RX ADMIN — WHITE PETROLATUM: 1.75 OINTMENT TOPICAL at 09:06

## 2023-01-20 ASSESSMENT — ACTIVITIES OF DAILY LIVING (ADL)
ADLS_ACUITY_SCORE: 67
ADLS_ACUITY_SCORE: 60
ADLS_ACUITY_SCORE: 67
ADLS_ACUITY_SCORE: 60
ADLS_ACUITY_SCORE: 67
ADLS_ACUITY_SCORE: 60
ADLS_ACUITY_SCORE: 60
ADLS_ACUITY_SCORE: 61

## 2023-01-20 NOTE — PROGRESS NOTES
[facility]    Medicine Progress Note - Hospitalist Service    Date of Admission:  12/29/2022    Brief history:  Blanco Osborne is an 57 year old male who is transferred from [facility] f to [facility] for  IV antibiotic treatment.  Patient has multiple medical problems including history of liver transplant 2016, PAF on chronic anticoagulation, history of DM2, CVA, HTN, CHRISTIAN on BiPAP, and history of alcohol abuse in the past causing liver damage ending with liver transplant.  About 6 weeks ago he had respiratory arrest and was diagnosed with infection with parainfluenza and strep pneumoniae and acute on chronic renal insufficiency ending with CRF needing 3/week hemodialysis.  During this period he was diagnosed with cirrhosis of transplanted liver and hyperammonia.  Currently he is on Lactulose and Rifaximin.        On 12/14/22, due to CIP, he was transferred to [facility] for the first time with Trach, PEG and Rt chest tube.    On 12/16/2022 patient was transferred back to [facility] due to respiratory insufficiency secondary to hydropneumothorax and drainage of 900 cc bloody pleural effusion, bloody stool and transfusion of 2 units PRBC.    On 12/20/2022 he had another episode of PEA arrest followed by CPR x2 minutes and possibly had seizure activity in [facility]. 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  He has been on ventilator and has improved to trach dome tolerating up to 6 hours so far. He had chest tube which was removed.    He had SBP with growth of lactobacillus and is currently on Unasyn which will be switched to Augmentin orally through 1/12/2023.    Pleural effusion grew Candida and is on micafungin.  He is also on  due to cirrhosis and hyperammonia.  He is anemic and is on Retacrit.   He is on lacosamide and Keppra due to history of seizure.  There is question of him drinking again  causing him cirrhosis of the transplanted liver.    LTAC course:  1/10/2023-1/15/2023.  Dialyzing. 1/12,Overnight patient had to be resuscitated with 1 L normal saline bolus after BPA sepsis fired noted to have lactic acid of 2.3. Following morning Hb is 6.6.  Transfused 1 unit of packed red blood cells.1/13,Patient had paracentesis and thoracentesis about 7500 cc of ascitic fluid and 50 cc of pleural fluid yielded. Cytology pending.1/15. Just about the same compared to yesterday. Hb fairly stable. No new changes at this time, continue with current medical management    1/16-1/20: Hgb 7.9 after transfusion and 8.1 on 1/18.  Transfused 1 unit PRBC 1/15/2023.  Apixaban resumed 1/17 since no further tracheal bleeding however currently on hold due to possible need for chest tube and lytics.  Right thoracentesis 1/13 fluid considered exudative, likely parapneumonic complicated effusion per Pulmonology.  Clinically, no fevers, normal WBC, right pleural fluid cultures NGTD.  ID consulted for antibiotic recommendations.  CT chest/abdomen/pelvis 1/18, shows concerns for infected parapneumonic effusions and SBP.  Given CT findings, discussed with Pulmonology team for need for chest tube and lytics, transfer to higher level of care.  Will need to repeat paracentesis for ascites culture for r/o SBP as well.  Repeat labs: Ammonia 32, CRP with CBC, lactate,  from 22, procalcitonin 1.64 from 1.81.  Holding off starting antibiotics pending likely transfer soon, preferably obtain pleural and paracentesis cultures before starting antibiotics.  Call placed to SOC 1/19 and 1/20 for finding bed availability within [facility] system at [facility], Count includes the [facility],  [place].    Assessment & Plan   Principal Problem:    Acute on chronic respiratory failure with hypoxia (H)  Active Problems:    Acquired immunocompromised state (H)    Cirrhosis of liver (H)    NAFLD (nonalcoholic fatty liver disease)    Liver transplanted (H)     Immunosuppression (H)    Critical illness myopathy    Seizures (H)    History of sudden cardiac arrest, PEA    ESRD (end stage renal disease) on dialysis (H)    Anemia of chronic disease due to ESRD, cirrhosis and hypersplenism      Acute now chronic respiratory failure requiring tracheostomy.  Initial event was parainfluenza and strep pneumo pneumonia.  Had failed extubation x2 and tracheostomy performed last hospitalization.  Had additional complications of bilateral pneumothoraces.  (Most recently was a hydropneumothorax where fluid grew Candida.. Chest tube was placed and removed . Had decent tolerance for pressure support and trach dome but after few hours and trach dome 12/27 had a pretty significant episode of desaturation.   -Wean ventilator per Pulmonology.  -Tracheostomy care per RT    Pneumonia  ARDS  Right pneumothorax  Bilateral pleural effusions  Treated for ARDS pneumonia, Aspergillus pneumonia during prior hospitalization and right chest tube now removed.  Left pleural fluid positive for Candida parapsilosis treated with IV micafungin and currently on fluconazole, to finish 4-week course.  Concern for right complicated parapneumonic effusion. S/p thoracentesis 11/26 and 1/13.  Right thoracentesis 1/13 consistent with exudative effusion with elevated LDH, high neutrophils, cultures show NGTD.    -Pulmonology considering intrapleural lytics or surgical intervention if any further deterioration, transfer to higher level of care  -Continue fluconazole to finish course for left pleural fluid Candida parapsilosis   -ID consulted 1/17 for further antibiotic recommendations  -CT chest/abdomen/pelvis 1/18: Bilateral small loculated effusions with associated pleural enlargement concerning for infected effusions, large volume ascites with peritoneal enhancement concerning for SBP.  -Arranging for transfer to higher level of care for chest tube placement, possible lytics, diagnostic/therapeutic paracentesis for  SBP    History PEA  Hypotension  PEA arrest prior to his previous admission and 1 episode of PEA associated with desaturation on 12/20.  No structural cardiac disease but does have A. Fib which neurologist thought might have been contributory to his embolic strokes.  Anticoagulation been restarted with stable labs although high risk for bleeding seconday to thrombocytopenia.  Also has developed baseline hypotension and is on midodrine 10 mg 3 times daily.  -Continue current midodrine dose      Infectious disease  At prior hospital:  1. 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 4.  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  Have had some conversations within our group and with transplant ID and general infectious disease.  General consensus is that ganciclovir probably could be discontinued  2. H/o Strep/Parainfluenza infection last hospitalization. Completed treatment course  3. 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  4.  Lactobacillus growing in the broth 4 days after paracentesis.  Cell count was very low.  5.  Candida in left pleural fluid cultures.  Sensitivities not resulted.  6.  Immunosuppressed on tacrolimus.  Discussed with transplant service at the [facility] who felt that given the absence of rejection on his most recent biopsy and ongoing issues with infection continuing with 1 twice daily tacrolimus is the best current option.  They have seen his subtherapeutic trough levels.  -Received Unasyn for 2 weeks total.  Changed to Augmentin, finished course on 1/12/2023.  -At least 4 weeks of antimicrobial for the Candida.   -Continue tacrolimus as above and tapering steroids  -Remains off ganciclovir    S/p Liver transplant 2016  Cirrhosis with ascites  Subacute bacterial peritonitis  Main manifestations of this are hepatic encephalopathy and ascites.  Hepatologist at [facility] felt that most likely reason for the cirrhosis was metabolic syndrome or alcohol intake.  There was no evidence of rejection on biopsy and there was  some evidence of regeneration. Paracentesis done on 12/22/2022 showed lactobacillus indicating SBP, treated with Unasyn and Augmentin, finished 2-week course 1/12/2023.  Requires large-volume paracentesis periodically for recurring ascites.    -Paracentesis 1/13/2023 yielded about 7500 cc.  Cytology studies pending.  Ascites fluid cultures NGTD.    -Repeat CT abdomen/pelvis on 1/18 concerning for SBP given peritoneal enhancement.  -Repeat labs 1/19:  from 22, procalcitonin 1.64 from 1.81.  Ammonia 32.  Holding off starting antibiotics pending likely transfer soon, preferably obtain pleural and paracentesis cultures before starting antibiotics.    -Continue intermittent paracentesis as needed if develops symptomatic ascites.    -Further treatment for recurrent ascites with TIPS deferred to his Liver Transplant team and/or Hepatology  -He has an appointment on 4/21/2023 with Hepatology, Dr. Simms  -Tacrolimus 1 mg BID and tapering steroids   -Lactulose and Rifaximin as above    Seizure after hypoxic event.  This is in the context of baseline multifactorial encephalopathy secondary to his ongoing critical illness and prior cardiac arrests and prior strokes and cirrhosis with hepatic encephalopathy. MRI of head of 12/20/22 reveals no PRES or leptomeningeal enhancement but scattered.  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   - Continue with  Keppra  indefinitely.  Neurology follow-up once discharge from LTACH.    Paroxysmal atrial fibrillation  Remains in sinus rhythm.  On anticoagulation with apixaban indefinitely for stroke prophylaxis.  Anticoagulation held since 1/6 due to tracheal bleeding noted during suctioning.  Apixaban resumed 1/17 since no further tracheal bleeding  -Continue anticoagulation indefinitely for secondary stroke prevention with apixaban      ESRD: Now on iHD.  No return of renal function.  - MWF schedule     Acute anemia  Status post 1  unit PRBCs transfusion 1/12/2023  -Repeat Hb 7.8, 1/12/2023  -Hgb 8.1 on 1/18.  Spuriously low hemoglobin of 5.9 drawn from PICC line earlier  -No evidence of overt bleeding.  -Anemia most likely secondary to critical illness/chronic disease, chronic renal disease  -Transfuse packed red blood cells to keep Hb> 7  -Monitor H&H.    Diabetes mellitus type 2  Titrating insulin for hypoglycemia since patient sometimes refuses to eat meals leading to subsequent hypoglycemia.   2. He is on steroids which are weaning  Plan:  -Lantus decreased to 10 units from 20 given relatively low glucose on 1/17.  Further decreased to 5 units daily on 1/19  -Hypoglycemic protocol in place    Moderate malnutrition, protein and calorie type.  -Continue with tube feeds via PEG tube  -Nutritionist consulted and following     Pancytopenia due to cirrhosis, ESRD and hypersplenism. WBC count has improved.  However he needs frequent PRBC transfusion due Hb <7.0.  Patient is on anticoagulation for PAF.  Currently in sinus rhythm.    -Monitor CBC, transfuse PRBC as needed for hemoglobin <7 as above        Diet: Adult Formula Bolus Feeding: Novasource Renal; Route: Gastrostomy; 3 times daily; Volume per Bolus: 250; mL(s); Additional free water (mL): 100 ml b/4 and after each bolus feeding if given; 125 ml/h x 2 h back up bolus feeding after each meal ONLY...  Soft & Bite Sized Diet (level 6) Liquidized/Moderately Thick (level 3)  Snacks/Supplements Adult: Gelatein Plus; With Meals  Snacks/Supplements Adult: Ensure Enlive; Between Meals    DVT Prophylaxis: DOAC  Nixon Catheter: Not present  Central Lines: PRESENT      Cardiac Monitoring: None  Code Status: Full Code      Disposition Plan      Expected Discharge Date: 01/31/2023    Discharge Delays: Complex Discharge    Discharge Comments: Vent. Trach. Phase 1. PEG.  Rectal Tube.      The patient's care was discussed with the Bedside Nurse, Patient and Patient's Family.    SPARKLE CRUZ,  MD  Hospitalist Service  LT[facility]  Securely message with the Vocera Web Console (learn more here)  Text page via [employer] Paging/Directory     Clinically Significant Risk Factors          # Hypocalcemia: Lowest iCa = 1.19 mg/dL in last 2 days, will monitor and replace as appropriate  # Hypercalcemia: corrected calcium is >10.1, will monitor as appropriate    # Hypoalbuminemia: Lowest albumin = 1.8 g/dL at 12/29/2022  4:40 AM, will monitor as appropriate             # Severe Malnutrition: based on nutrition assessment      _____________________________________________________________________    Interval History   No new events overnight per RN  Glucose in the 80s-low 100s  Remains afebrile, vital stable  Awaiting transfer to higher level of care    Data reviewed today: I reviewed all medications, new labs and imaging results over the last 24 hours. I personally reviewed.    Physical Exam   Vital Signs: Temp: 97.4  F (36.3  C) Temp src: Oral BP: 94/58 Pulse: 102   Resp: 22 SpO2: 98 % O2 Device: None (Room air)    Weight: 180 lbs 14.4 oz   General: Alert, oriented.  Frail and debilitated.  HEENT: Normal conjunctiva, anicteric.  Neck: Supple.  Trach noted  Lungs: Clear to auscultation  Heart: S1, S2.  No murmurs  Abdomen: Soft, minimally distended, ascites noted, bowel sounds noted  Extremities: No peripheral edema.  PICC line in place.  Skin: Ecchymotic spots around neck.  No rash or ulcers.    Data   Recent Labs   Lab 01/20/23  0545 01/19/23  2328 01/19/23  1034 01/19/23  0631 01/19/23  0627 01/17/23  1903 01/17/23  0952 01/17/23  0555 01/16/23  0635 01/14/23  0759 01/14/23  0615 01/13/23  1753 01/13/23  1750   WBC  --   --   --   --  7.2  --   --   --   --   --  6.2  --  7.0   HGB  --   --  8.1* 7.7* 5.9*  --  8.1*  --  7.9*  --  7.7*   < > 8.6*   MCV  --   --   --   --  99  --   --   --   --   --  99  --  99   PLT  --   --   --   --  238  --   --   --   --   --  214  --  253   NA  --   --   --  135* 134*  --  136  --   133*  --  134*   < >  --    POTASSIUM  --   --   --  3.6 3.7  --  3.6  --  4.0  --  4.1   < >  --    CHLORIDE  --   --   --   --  96*  --   --   --  94*  --  97*  --   --    CO2  --   --   --   --  28  --   --   --  27  --  28  --   --    BUN  --   --   --   --  26.3*  --   --   --  54.7*  --  34.1*  --   --    CR  --   --   --   --  2.21*  --   --   --  3.18*  --  1.94*  --   --    ANIONGAP  --   --   --   --  10  --   --   --  12  --  9  --   --    KELLY  --   --   --   --  8.9  --   --   --  9.4  --  8.9  --   --    GLC 85 121*  --  71 73   < > 104   < > 96   < > 78   < >  --    ALBUMIN  --   --  2.0*  --  1.9*  --   --   --   --   --  1.9*   < >  --    PROTTOTAL  --   --   --   --  6.2*  --   --   --   --   --  6.4  --   --    BILITOTAL  --   --   --   --  0.5  --   --   --   --   --  0.7  --   --    ALKPHOS  --   --   --   --  236*  --   --   --   --   --  250*  --   --    ALT  --   --   --   --  8*  --   --   --   --   --  11  --   --    AST  --   --   --   --  20  --   --   --   --   --  23  --   --     < > = values in this interval not displayed.     Medications     dextrose       - MEDICATION INSTRUCTIONS -         sodium chloride 0.9%  300 mL Hemodialysis Machine During Dialysis/CRRT (from stock)     acetaminophen  975 mg Per Feeding Tube Q8H BIJU    Or     acetaminophen  650 mg Rectal Q8H BIJU     acetylcysteine  2 mL Nebulization BID     albuterol  2.5 mg Nebulization 4x daily     [Held by provider] apixaban ANTICOAGULANT  5 mg Oral or Feeding Tube BID     carboxymethylcellulose PF  1 drop Both Eyes TID     chlorhexidine  15 mL Mouth/Throat BID     epoetin mirta-epbx  40,000 Units Subcutaneous Weekly     fluconazole  200 mg Oral or Feeding Tube At Bedtime     folic acid  1 mg Oral or Feeding Tube Daily     heparin Lock (1000 units/mL High concentration)  1,900 Units Intracatheter During Dialysis/CRRT (from stock)     heparin Lock (1000 units/mL High concentration)  1,900 Units Intracatheter During  Dialysis/CRRT (from stock)     insulin aspart  1-6 Units Subcutaneous Daily     insulin glargine  5 Units Subcutaneous Daily     lacosamide  100 mg Oral or Feeding Tube BID     lactulose  20 g Oral or Feeding Tube BID     levETIRAcetam  1,000 mg Oral or Feeding Tube Q24H     Melatonin  6 mg Oral or NG Tube At Bedtime     midodrine  10 mg Oral During Dialysis/CRRT (from stock)     midodrine  5 mg Oral or Feeding Tube TID w/meals     mineral oil-hydrophilic petrolatum   Topical Daily     multivitamin RENAL  1 tablet Per Feeding Tube Daily     nystatin  500,000 Units Swish & Swallow 4x Daily     pantoprazole  40 mg Per Feeding Tube BID     QUEtiapine  75 mg Oral or Feeding Tube At Bedtime     rifaximin  550 mg Oral or Feeding Tube BID     sodium chloride (PF)  10-30 mL Intracatheter Q8H     tacrolimus  1 mg Oral or Feeding Tube BID

## 2023-01-20 NOTE — PLAN OF CARE
Problem: Artificial Airway  Goal: Effective Communication  Outcome: Progressing  Goal: Optimal Device Function  Outcome: Progressing  Intervention: Optimize Device Care and Function  Recent Flowsheet Documentation  Taken 1/19/2023 2045 by Lyudmila Galvin RT  Airway Safety Measures: all equipment/monitors on and audible  Goal: Absence of Device-Related Skin or Tissue Injury  Outcome: Progressing     RT PROGRESS NOTE     DATA:     CURRENT SETTINGS:              TRACH TYPE / SIZE: #7 [place], placed on 01/9/22             MODE: capped             FIO2: Room air     ACTION:             THERAPIES: Albuterol QID/Mucomyst BID/Flutter valve QID              SUCTION:                           FREQUENCY: X1                        AMOUNT: small                         CONSISTENCY: thick                        COLOR: pale yellow             SPONTANEOUS COUGH EFFORT/STRENGTH OF EFFORT (not elicited by suctioning): weak productive cough                            WEANING PHASE: 3                        WEAN MODE: capped on RA as ashley                        WEAN TIME: Since 07:40 a.m , 01/18                        END WEAN REASON: ongoing      RESPONSE:             BS: clear             VITAL SIGNS: /63 (BP Location: Left arm, Patient Position: Left side, Cuff Size: Adult Small)   Pulse 103   Temp 97.5  F (36.4  C) (Oral)   Resp 18   Wt 84.5 kg (186 lb 3.2 oz)   SpO2 93%   BMI 25.25 kg/m                      EMOTIONAL NEEDS / CONCERNS:                  RISK FOR SELF DECANNULATION:                          RISK DUE TO:                          INTERVENTION/S IN PLACE IS/ARE:         NOTE / PLAN: Patient remains capped on room air, tolerating it well. Neb txs given as order. Pt did his neb for 2 min and decline it. Flutter valve QID. Will cont to monitor.

## 2023-01-20 NOTE — PLAN OF CARE
Problem: Artificial Airway  Goal: Effective Communication  1/20/2023 1758 by Inna Guzman RN  Outcome: Progressing  1/20/2023 1249 by Inna Guzman RN  Outcome: Progressing     Problem: Fall Injury Risk  Goal: Absence of Fall and Fall-Related Injury  1/20/2023 1758 by Inna Guzman RN  Outcome: Progressing  1/20/2023 1249 by Inna Guzman RN  Outcome: Progressing  Intervention: Promote Injury-Free Environment  Recent Flowsheet Documentation  Taken 1/20/2023 1614 by Inna Guzman RN  Safety Promotion/Fall Prevention:    activity supervised    bed alarm on    fall prevention program maintained    clutter free environment maintained  Taken 1/20/2023 0949 by Inna Guzman RN  Safety Promotion/Fall Prevention:    activity supervised    bed alarm on    fall prevention program maintained    clutter free environment maintained   Goal Outcome Evaluation:      Patient complained of abdominal pain (8/10) at the start of shift (unable to get anything for that at that time-had scheduled tylenol at 1343 -patient was repositioned in bed and felt more comfortable. Patient's dialysis was started at about 1700 per report, will end at 2000, patient could not eat at this time-tray will be saved till after dialysis. Blood pressure was 105/66 at 1700 (scheduled midodrine given). Patient is on cardiac monitoring for dialysis-initial rhythm was normal sinus at 1715. Patient was given tylenol 650 mg at 1818 for abdominal pain of 8/10, phosphorus protocol was discontinued in am

## 2023-01-20 NOTE — PROGRESS NOTES
RENAL PROGRESS NOTE    CC:  LORETTA likely progressed to ESRD    57 YOM EtOH cirrhosis , SP liver tx 2016, on IHD for LORETTA likely progressed to ESRD since 11/29/2022, recent prolonged hosp for PEA arrest 11/15/2022 to 12/15/2022 , cb influenza and bact PNA, sp Trach and PEG and had a second PEA arrest 12/20 with seizures , cb pleural effusions (fungal infection), and recurrent ascites , CSF + for HHV 6, now at [facility] , nephrology following for ESRD management.    ASSESSMENT & PLAN:     Anuric LORETTA likely ESRD: now requiring dialysis after PEA arrest, was on CRRT which was discontinued 11/26/2022 and started on intermittent HD ongoing since 11/29/2022.  He is maintained on a MWF schedule.  No signs of renal recovery.  Attempted diuretic challenge and bladder scans->remains anuric and torsemide was discontinued 1/2. Aim to keep net negative.  Target UF 2-3kg as BP allows. Midodrine to support blood pressure and UF. MWF HD at [facility]     Anemia of chronic renal failure.   Hematochezia   Left hemothorax.    On NORA 40K weekly, continue. Previously low iron, holding off replacement given infectious concerns as below. Transfusing per hospitalist service      Chronic hypotension: Likely 2/2 chronic liver disease, improving.  on midodrine to 5mg TID with parameters to hold for SBP>110.  may need to increase if hemodynamics do not support UF. Will hold off albumin for now     Hypokalemia: Needing intermittent replacement.  He is higher K bath with HD.     Acute on chronic hypoxic respiratory failure: S/p tracheostomy 11/29/2022.  Complicated by left hemopneumothorax s/p left chest tube.  History of aspergillus PNA and multiple bacterial PNA. On micafungin.  Mgmt per pulm      CARRILLO cirrhosis s/p liver transplant: Immunosuppression per GI.  Tacrolimus and prednisone.  Paracentesis as indicated per GI, planned for 1/13/2023. On Abx for lactobacillus peritonitis.      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   Initial seizure after PEA arrest, then another seizure again 12/21/2022 during dialysis session.  Initially on acyclovir and then switched over to ganciclovir, since been discontinued.  On Keppra and Vimpat.  Management per neurology and ID       SUBJECTIVE:    Per HD RN, lines reversed  And working well last 2 treatments, line not sluggish with good inflow/outflow with lavage. No access concerns later in the week.  Discussed MWF HD schedule, hg low continue NORA therapy.   Labs reviewed, mild hyponatremia, monitor with HD  Pt denies pain, n, v, c, d, sob, fever, rash or CP  Sleep and appetite stable  Wt is down trending  Pt reports some cramping with HD, decreased UF goal to 1L last visit. Denied cramping 1/18/23.  Discussed with RN okay to D/UC phos replacement protocol with stable po4 recently.   HD planned for today.  Answered all questions.     OBJECTIVE:  Physical Exam   Temp: 97.4  F (36.3  C) Temp src: Oral BP: 102/65 Pulse: 102   Resp: 22 SpO2: 98 % O2 Device: None (Room air)    Vitals:    01/18/23 0411 01/19/23 0253 01/20/23 0546   Weight: 83.3 kg (183 lb 9.6 oz) 84.5 kg (186 lb 3.2 oz) 82.1 kg (180 lb 14.4 oz)     Vital Signs with Ranges  Temp:  [97.4  F (36.3  C)-99  F (37.2  C)] 97.4  F (36.3  C)  Pulse:  [] 102  Resp:  [18-24] 22  BP: ()/(57-69) 102/65  FiO2 (%):  [21 %] 21 %  SpO2:  [93 %-99 %] 98 %  I/O last 3 completed shifts:  In: 530 [P.O.:360; I.V.:20; NG/GT:150]  Out: 300 [Stool:300]    Patient Vitals for the past 72 hrs:   Weight   01/20/23 0546 82.1 kg (180 lb 14.4 oz)   01/19/23 0253 84.5 kg (186 lb 3.2 oz)   01/18/23 0411 83.3 kg (183 lb 9.6 oz)     Intake/Output Summary (Last 24 hours) at 1/16/2023 0918  Last data filed at 1/16/2023 0648  Gross per 24 hour   Intake 640 ml   Output 700 ml   Net -60 ml     PHYSICAL EXAM:  GEN: NAD, thin/frail  CV: RRR   Lung: clear and equal  Ab: soft and NT; distended; normal bs  Ext: no edema and well perfused  Skin: no rash  Access:  C/d/I    LABORATORY STUDIES:     Recent Labs   Lab 01/19/23  1034 01/19/23  0631 01/19/23  0627 01/17/23  0952 01/16/23  0635 01/14/23  0615 01/13/23  1753 01/13/23  1750   WBC  --   --  7.2  --   --  6.2  --  7.0   RBC  --   --  2.00*  --   --  2.56*  --  2.94*   HGB 8.1* 7.7* 5.9* 8.1* 7.9* 7.7*   < > 8.6*   HCT  --   --  19.8*  --   --  25.3*  --  29.1*   PLT  --   --  238  --   --  214  --  253    < > = values in this interval not displayed.     Basic Metabolic Panel:  Recent Labs   Lab 01/20/23  0545 01/19/23  2328 01/19/23  0631 01/19/23  0627 01/19/23  0612 01/18/23  0822 01/17/23  1903 01/17/23  0952 01/17/23  0555 01/16/23  0635 01/14/23  0759 01/14/23  0615 01/14/23  0550 01/13/23  1753   NA  --   --  135* 134*  --   --   --  136  --  133*  --  134*  --  136   POTASSIUM  --   --  3.6 3.7  --   --   --  3.6  --  4.0  --  4.1  --  3.7   CHLORIDE  --   --   --  96*  --   --   --   --   --  94*  --  97*  --   --    CO2  --   --   --  28  --   --   --   --   --  27  --  28  --   --    BUN  --   --   --  26.3*  --   --   --   --   --  54.7*  --  34.1*  --   --    CR  --   --   --  2.21*  --   --   --   --   --  3.18*  --  1.94*  --   --    GLC 85 121* 71 73 73 80   < > 104   < > 96   < > 78   < > 112   KELLY  --   --   --  8.9  --   --   --   --   --  9.4  --  8.9  --   --     < > = values in this interval not displayed.     INRNo lab results found in last 7 days.     Recent Labs   Lab Test 01/19/23  1034 01/19/23  0631 01/19/23  0627 01/16/23  0635 01/14/23  0615 12/22/22  1108 12/21/22  1315 12/17/22  0519 12/16/22  2239   INR  --   --   --   --   --   --  1.36*  --  1.14   WBC  --   --  7.2  --  6.2   < > 3.4*   < > 3.9*   HGB 8.1* 7.7* 5.9*   < > 7.7*   < > 8.0*   < > 7.9*  7.9*   PLT  --   --  238  --  214   < > 75*   < > 94*    < > = values in this interval not displayed.     Personally reviewed current labs    Jie BABB-BC  [facility]  [phone]

## 2023-01-20 NOTE — PLAN OF CARE
Problem: Pain Acute  Goal: Optimal Pain Control and Function  Outcome: Progressing     Problem: Malnutrition  Goal: Improved Nutritional Intake  Outcome: Progressing     Problem: Enteral Nutrition  Goal: Absence of Aspiration Signs and Symptoms  Outcome: Progressing     Problem: Chronic Kidney Disease  Goal: Acceptable Pain Control  Outcome: Progressing     Problem: Artificial Airway  Goal: Effective Communication  Outcome: Progressing     Problem: Fall Injury Risk  Goal: Absence of Fall and Fall-Related Injury  Outcome: Progressing  Intervention: Promote Injury-Free Environment  Recent Flowsheet Documentation  Taken 1/20/2023 0923 by Inna Guzman RN  Safety Promotion/Fall Prevention:    activity supervised    bed alarm on    fall prevention program maintained    clutter free environment maintained   Goal Outcome Evaluation:    Patient denied pain, paracentesis and chest tube placement appointment was cancelled due to [place] inability to place a chest tube with lytic administration-waiting for [facility] to call back for possible admission today. Blood pressure was 102/65 at 0726, 94/58 at 0904 prior to given midodrine, midodrine was held at 12 noon due a systolic blood pressure greater than 110 (was 117/74). Patient was up in chair for breakfast and lunch, rectal tube leaked a large amount of loose stool so far this shift, patient got a new abrasion on his left shoulder (Mepilex  was placed)

## 2023-01-20 NOTE — PLAN OF CARE
Problem: Plan of Care - These are the overarching goals to be used throughout the patient stay.    Goal: Absence of Hospital-Acquired Illness or Injury  Outcome: Progressing  Intervention: Identify and Manage Fall Risk  Recent Flowsheet Documentation  Taken 1/20/2023 0001 by Noemi Vidal RN  Safety Promotion/Fall Prevention:    activity supervised    room door open    room near nurse's station    bed alarm on    lighting adjusted  Intervention: Prevent Skin Injury  Recent Flowsheet Documentation  Taken 1/20/2023 0546 by Noemi Vidal RN  Body Position:    turned    right  Taken 1/20/2023 0300 by Noemi Vidal RN  Body Position:    left    turned  Intervention: Prevent Infection  Recent Flowsheet Documentation  Taken 1/20/2023 0001 by Noemi Vidal RN  Infection Prevention:    hand hygiene promoted    rest/sleep promoted     Problem: Plan of Care - These are the overarching goals to be used throughout the patient stay.    Goal: Absence of Hospital-Acquired Illness or Injury  Intervention: Identify and Manage Fall Risk  Recent Flowsheet Documentation  Taken 1/20/2023 0001 by Noemi Vidal RN  Safety Promotion/Fall Prevention:    activity supervised    room door open    room near nurse's station    bed alarm on    lighting adjusted     Problem: Plan of Care - These are the overarching goals to be used throughout the patient stay.    Goal: Absence of Hospital-Acquired Illness or Injury  Intervention: Prevent Skin Injury  Recent Flowsheet Documentation  Taken 1/20/2023 0546 by Noemi Vidal RN  Body Position:    turned    right  Taken 1/20/2023 0300 by Noemi Vidal RN  Body Position:    left    turned     Problem: Plan of Care - These are the overarching goals to be used throughout the patient stay.    Goal: Absence of Hospital-Acquired Illness or Injury  Intervention: Prevent Infection  Recent Flowsheet Documentation  Taken 1/20/2023 0001  by Noemi Vidal RN  Infection Prevention:    hand hygiene promoted    rest/sleep promoted     Problem: Pain Acute  Goal: Optimal Pain Control and Function  Outcome: Progressing  Intervention: Prevent or Manage Pain  Recent Flowsheet Documentation  Taken 1/20/2023 0001 by Noemi Vidal RN  Medication Review/Management: medications reviewed     Problem: Fall Injury Risk  Goal: Absence of Fall and Fall-Related Injury  Outcome: Progressing  Intervention: Identify and Manage Contributors  Recent Flowsheet Documentation  Taken 1/20/2023 0001 by Noemi Vidal RN  Medication Review/Management: medications reviewed  Intervention: Promote Injury-Free Environment  Recent Flowsheet Documentation  Taken 1/20/2023 0001 by Noemi Vidal RN  Safety Promotion/Fall Prevention:    activity supervised    room door open    room near nurse's station    bed alarm on    lighting adjusted   Goal Outcome Evaluation:         Pt alert and oriented. Repositioned almost every one hours. Slept on and off. No new concern. Pt is going for chest tube and for paracentesis. Refused bolus. No respiratory distress noted. Will cont to monitor      Noemi Jerez RN

## 2023-01-20 NOTE — PLAN OF CARE
Problem: Malnutrition  Goal: Improved Nutritional Intake  Outcome: Not Progressing     Problem: Diarrhea  Goal: Effective Diarrhea Management  Outcome: Not Progressing     Problem: Pain Acute  Goal: Optimal Pain Control and Function  Outcome: Progressing   Goal Outcome Evaluation:    Patient denied pain, blood pressure was 120/69 at 1657-midodrine was held. Patient ate 0 % of supper-refused back-up tube feeding, up in chair for supper, rectal tube bypassed a large amount of loose stool, abrasion noted on coccyx probably from scratching

## 2023-01-20 NOTE — PROGRESS NOTES
[facility] Progress Note    SUBJECTIVE:  Afebrile.  Extensive chart review as first time seeing the pt.       REVIEW OF SYSTEMS:  Negative unless as listed above.  Social history, Family history, Medications: reviewed.    OBJECTIVE:  /74 (BP Location: Left arm, Patient Position: Chair)   Pulse 105   Temp 97.4  F (36.3  C) (Oral)   Resp 22   Wt 82.1 kg (180 lb 14.4 oz)   SpO2 96%   BMI 24.53 kg/m                PHYSICAL EXAM:  Constitutional: Awake, no apparent distress  Lungs: normal breathing pattern, no crackles or wheezing  Cardiovascular:  S1 S2  Abdomen: Ascites  Skin: warm  Neuro: deconditioned  Psych: able to answer questions     Antibiotics:  fluc  Pertinent labs:  Lab Results   Component Value Date    WBC 7.2 01/19/2023    WBC 4.0 04/20/2020     Lab Results   Component Value Date    RBC 2.00 01/19/2023    RBC 3.95 04/20/2020     Lab Results   Component Value Date    HGB 8.1 01/19/2023    HGB 7.7 01/19/2023    HGB 14.3 04/20/2020     Lab Results   Component Value Date    HCT 19.8 01/19/2023    HCT 43.1 04/20/2020     No components found for: MCT  Lab Results   Component Value Date    MCV 99 01/19/2023     04/20/2020     Lab Results   Component Value Date    MCH 29.5 01/19/2023    MCH 36.2 04/20/2020     Lab Results   Component Value Date    MCHC 29.8 01/19/2023    MCHC 33.2 04/20/2020     Lab Results   Component Value Date    RDW 18.8 01/19/2023    RDW 12.7 04/20/2020     Lab Results   Component Value Date     01/19/2023     04/20/2020       Last Comprehensive Metabolic Panel:  Sodium   Date Value Ref Range Status   01/19/2023 134 (L) 136 - 145 mmol/L Final   04/20/2020 139 133 - 144 mmol/L Final     Sodium POCT   Date Value Ref Range Status   01/19/2023 135 (L) 136 - 145 mmol/L Final     Potassium   Date Value Ref Range Status   01/19/2023 3.7 3.4 - 5.3 mmol/L Final   12/29/2022 3.4 3.4 - 5.3 mmol/L Final   04/20/2020 3.9 3.4 - 5.3 mmol/L Final     Potassium  POCT   Date Value Ref Range Status   01/19/2023 3.6 3.5 - 5.0 mmol/L Final     Chloride   Date Value Ref Range Status   01/19/2023 96 (L) 98 - 107 mmol/L Final   12/29/2022 103 94 - 109 mmol/L Final   04/20/2020 106 94 - 109 mmol/L Final     Chloride (External) (External)   Date Value Ref Range Status   08/05/2020 105 98 - 112 mmol/L Final     Carbon Dioxide   Date Value Ref Range Status   04/20/2020 28 20 - 32 mmol/L Final     Carbon Dioxide (CO2)   Date Value Ref Range Status   01/19/2023 28 22 - 29 mmol/L Final   12/29/2022 31 20 - 32 mmol/L Final     Anion Gap   Date Value Ref Range Status   01/19/2023 10 7 - 15 mmol/L Final   12/29/2022 3 3 - 14 mmol/L Final   04/20/2020 6 3 - 14 mmol/L Final     Glucose   Date Value Ref Range Status   12/29/2022 160 (H) 70 - 99 mg/dL Final   04/20/2020 93 70 - 99 mg/dL Final     Comment:     Fasting specimen     GLUCOSE BY METER POCT   Date Value Ref Range Status   01/20/2023 85 70 - 99 mg/dL Final     Urea Nitrogen   Date Value Ref Range Status   01/19/2023 26.3 (H) 6.0 - 20.0 mg/dL Final   12/29/2022 46 (H) 7 - 30 mg/dL Final   04/20/2020 23 7 - 30 mg/dL Final     Creatinine   Date Value Ref Range Status   01/19/2023 2.21 (H) 0.67 - 1.17 mg/dL Final   04/20/2020 1.06 0.66 - 1.25 mg/dL Final     GFR Estimate   Date Value Ref Range Status   01/19/2023 34 (L) >60 mL/min/1.73m2 Final     Comment:     Effective December 21, 2021 eGFRcr in adults is calculated using the 2021 CKD-EPI creatinine equation which includes age and gender (Alfredo et al., NEJM, DOI: 10.1056/NTUYkg0334579)   04/20/2020 78 >60 mL/min/[1.73_m2] Final     Comment:     Non  GFR Calc  Starting 12/18/2018, serum creatinine based estimated GFR (eGFR) will be   calculated using the Chronic Kidney Disease Epidemiology Collaboration   (CKD-EPI) equation.       Calcium   Date Value Ref Range Status   01/19/2023 8.9 8.6 - 10.0 mg/dL Final   04/20/2020 9.2 8.5 - 10.1 mg/dL Final       Liver Function  Studies -   Recent Labs   Lab Test 01/19/23  1034 01/19/23  0627   PROTTOTAL  --  6.2*   ALBUMIN 2.0* 1.9*   BILITOTAL  --  0.5   ALKPHOS  --  236*   AST  --  20   ALT  --  8*       No results found for: SED    CRP Inflammation   Date Value Ref Range Status   01/19/2023 133.90 (H) <5.00 mg/L Final   11/22/2022 22.3 (H) 0.0 - 8.0 mg/L Final               MICROBIOLOGY DATA:        IMAGING/RADIOLOGY:          ASSESSMENT:  FROM AIDE note:    CT chest/abdomen/pelvis 1/18, shows concerns for infected parapneumonic effusions and SBP.  Given CT findings, discussed with Pulmonology team for need for chest tube and lytics, transfer to higher level of care.  Will need to repeat paracentesis for ascites culture for r/o SBP as well.  Repeat labs: Ammonia 32, CRP with CBC, lactate,  from 22, procalcitonin 1.64 from 1.81.  Holding off starting antibiotics pending likely transfer soon, preferably obtain pleural and paracentesis cultures before starting antibiotics.  Call placed to SOC 1/19 and 1/20 for finding bed availability within [facility] at [facility], [facility], [facility].     From [place] Note assessment(s):  1. Liver transplantation 2016, on tacrolimus  2. Prolonged hospitalization 11/12-12/15 for PEA cardiac arrest, pneumococcal/parainfluenza pneumonia with ARDS, septic shock with multiorgan failure  3. Hemodialysis  4. Status post trach/PEG  5. Right pneumothorax requiring previous chest tube.  6. Discharged to LTAC with chest tube, and hospitalized at [facility] on 12/16/2022 with PEA arrest and seizures, GI bleed, left pneumothorax  7. 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 Spontaneous bacterial peritonitis ascites cultures had previously shown lactobacillus  9. 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 Seizure onset with evidence of CNS embolic infarct unclear source.  11. Thoracentesis on 1/13/2023: 7918 nucleated cells,, 93% neutrophils, 60,000 RBC, right  pleural cavity     RECOMMENDATION:    Continue fluconazole at least until 1/23/2023, 4-week course    Follow pleural fluid cultures, blood culture results  Updated patient' and patient's wife at bedside      CT chest abdomen and pelvis showed loculated effusion, ascites, plan for drainage, may need hospitalization at [facility]    If aspirate concerning for infection, start empiric meropenem and vancomycin.  If pt goes to [facility] (don't think he will) I will see him there over the weekend.      JACKIE Curry MD  Office [phone] option 2 to desk staff

## 2023-01-20 NOTE — PROGRESS NOTES
Social Work Note:  Patient chart reviewed.  Patient discussed in morning rounds.  Barriers to discharge:   * Trach capped  * Needs hest tube and right thoracentesis and paracentesis   * Rectal Tube  * tele monitor  * WOC-bilat feet.       Patient here with Chest tube, Trach, Rectal tube,  Nebs:Duo/Muco QID, rectal tube, tele monitor, WOC, and feeding tube.  Patient's exact discharge date, time, disposition TBD  SW will continue to follow for psychosocial and emotional support of patient and family. SW to facilitate discharge to TCU when pt no longer requires LTACH level of care           Kb Liu, [facility] CHRISTOPHER/[place]  [phone]

## 2023-01-20 NOTE — PROGRESS NOTES
RT PROGRESS NOTE     DATA:     CURRENT SETTINGS:             TRACH TYPE / SIZE:  7 Bivona 1/9/23             MODE: Capped             FIO2:   RA     ACTION:             THERAPIES:  Alb/Aerobika QID, Muco BID             SUCTION:                           FREQUENCY:  x1                        AMOUNT: Scant                        CONSISTENCY: Thick                        COLOR: White             SPONTANEOUS COUGH EFFORT/STRENGTH OF EFFORT (not elicited by suctioning): Yes/weak                           WEANING PHASE:  3                        WEAN MODE: Capped RA                        WEAN TIME:  As tolerated                        END WEAN REASON: On going     RESPONSE:             BS: Clear/diminished             VITAL SIGNS: Sat 96-99%, HR , RR 16-24             EMOTIONAL NEEDS / CONCERNS:  NA                RISK FOR SELF DECANNULATION:  No     NOTE / PLAN:  Cont with plan. Pt took his morning neb but refused the rest of his neb treatments.

## 2023-01-21 ENCOUNTER — HOSPITAL ENCOUNTER (INPATIENT)
Facility: CLINIC | Age: 58
LOS: 97 days | Discharge: HOME HEALTH | DRG: 981 | End: 2023-04-28
Attending: STUDENT IN AN ORGANIZED HEALTH CARE EDUCATION/TRAINING PROGRAM | Admitting: STUDENT IN AN ORGANIZED HEALTH CARE EDUCATION/TRAINING PROGRAM
Payer: COMMERCIAL

## 2023-01-21 VITALS
WEIGHT: 181.7 LBS | OXYGEN SATURATION: 98 % | BODY MASS INDEX: 24.64 KG/M2 | DIASTOLIC BLOOD PRESSURE: 71 MMHG | TEMPERATURE: 97.7 F | HEART RATE: 93 BPM | RESPIRATION RATE: 20 BRPM | SYSTOLIC BLOOD PRESSURE: 120 MMHG

## 2023-01-21 DIAGNOSIS — R12 HEARTBURN: ICD-10-CM

## 2023-01-21 DIAGNOSIS — G72.81 CRITICAL ILLNESS MYOPATHY: ICD-10-CM

## 2023-01-21 DIAGNOSIS — G47.00 INSOMNIA, UNSPECIFIED TYPE: ICD-10-CM

## 2023-01-21 DIAGNOSIS — K70.30 ALCOHOLIC CIRRHOSIS OF LIVER WITHOUT ASCITES (H): ICD-10-CM

## 2023-01-21 DIAGNOSIS — G93.40 ENCEPHALOPATHY: ICD-10-CM

## 2023-01-21 DIAGNOSIS — J90 PLEURAL EFFUSION: Primary | ICD-10-CM

## 2023-01-21 DIAGNOSIS — I48.0 PAROXYSMAL ATRIAL FIBRILLATION (H): ICD-10-CM

## 2023-01-21 DIAGNOSIS — M79.10 MUSCLE SORENESS: ICD-10-CM

## 2023-01-21 DIAGNOSIS — Z99.2 ESRD (END STAGE RENAL DISEASE) ON DIALYSIS (H): ICD-10-CM

## 2023-01-21 DIAGNOSIS — R56.9 SEIZURES (H): ICD-10-CM

## 2023-01-21 DIAGNOSIS — K70.31 ALCOHOLIC CIRRHOSIS OF LIVER WITH ASCITES (H): ICD-10-CM

## 2023-01-21 DIAGNOSIS — I95.3 HEMODIALYSIS-ASSOCIATED HYPOTENSION: ICD-10-CM

## 2023-01-21 DIAGNOSIS — I46.9 PEA (PULSELESS ELECTRICAL ACTIVITY) (H): ICD-10-CM

## 2023-01-21 DIAGNOSIS — G25.81 RESTLESS LEG SYNDROME: ICD-10-CM

## 2023-01-21 DIAGNOSIS — N18.6 ESRD (END STAGE RENAL DISEASE) ON DIALYSIS (H): ICD-10-CM

## 2023-01-21 DIAGNOSIS — R09.2 RESPIRATORY ARREST (H): ICD-10-CM

## 2023-01-21 DIAGNOSIS — N17.9 ACUTE RENAL FAILURE, UNSPECIFIED ACUTE RENAL FAILURE TYPE (H): ICD-10-CM

## 2023-01-21 DIAGNOSIS — Z94.4 LIVER TRANSPLANTED (H): ICD-10-CM

## 2023-01-21 PROBLEM — J91.8 PARAPNEUMONIC EFFUSION: Status: ACTIVE | Noted: 2023-01-21

## 2023-01-21 PROBLEM — J18.9 PARAPNEUMONIC EFFUSION: Status: ACTIVE | Noted: 2023-01-21

## 2023-01-21 LAB
GLUCOSE BLDC GLUCOMTR-MCNC: 106 MG/DL (ref 70–99)
GLUCOSE BLDC GLUCOMTR-MCNC: 114 MG/DL (ref 70–99)
GLUCOSE BLDC GLUCOMTR-MCNC: 119 MG/DL (ref 70–99)
GLUCOSE BLDC GLUCOMTR-MCNC: 91 MG/DL (ref 70–99)
GLUCOSE BLDC GLUCOMTR-MCNC: 92 MG/DL (ref 70–99)

## 2023-01-21 PROCEDURE — 250N000012 HC RX MED GY IP 250 OP 636 PS 637: Performed by: HOSPITALIST

## 2023-01-21 PROCEDURE — 120N000001 HC R&B MED SURG/OB

## 2023-01-21 PROCEDURE — 250N000013 HC RX MED GY IP 250 OP 250 PS 637: Performed by: HOSPITALIST

## 2023-01-21 PROCEDURE — 120N000013 HC R&B IMCU

## 2023-01-21 PROCEDURE — 250N000013 HC RX MED GY IP 250 OP 250 PS 637: Performed by: STUDENT IN AN ORGANIZED HEALTH CARE EDUCATION/TRAINING PROGRAM

## 2023-01-21 PROCEDURE — 250N000013 HC RX MED GY IP 250 OP 250 PS 637: Performed by: NURSE PRACTITIONER

## 2023-01-21 PROCEDURE — 250N000009 HC RX 250: Performed by: HOSPITALIST

## 2023-01-21 PROCEDURE — 99223 1ST HOSP IP/OBS HIGH 75: CPT | Mod: AI | Performed by: STUDENT IN AN ORGANIZED HEALTH CARE EDUCATION/TRAINING PROGRAM

## 2023-01-21 PROCEDURE — 99239 HOSP IP/OBS DSCHRG MGMT >30: CPT | Performed by: FAMILY MEDICINE

## 2023-01-21 PROCEDURE — 999N000009 HC STATISTIC AIRWAY CARE

## 2023-01-21 PROCEDURE — 999N000157 HC STATISTIC RCP TIME EA 10 MIN

## 2023-01-21 RX ORDER — FLUCONAZOLE 200 MG/1
200 TABLET ORAL AT BEDTIME
Status: CANCELLED | OUTPATIENT
Start: 2023-01-21 | End: 2023-01-23

## 2023-01-21 RX ORDER — NYSTATIN 100000/ML
500000 SUSPENSION, ORAL (FINAL DOSE FORM) ORAL 4 TIMES DAILY
Status: DISCONTINUED | OUTPATIENT
Start: 2023-01-21 | End: 2023-03-28

## 2023-01-21 RX ORDER — HEPARIN SODIUM 1000 [USP'U]/ML
1900 INJECTION, SOLUTION INTRAVENOUS; SUBCUTANEOUS
Status: DISCONTINUED | OUTPATIENT
Start: 2023-01-21 | End: 2023-01-21

## 2023-01-21 RX ORDER — ACETAMINOPHEN 650 MG/20.3ML
650 LIQUID ORAL EVERY 4 HOURS PRN
Status: CANCELLED | OUTPATIENT
Start: 2023-01-21

## 2023-01-21 RX ORDER — MIDODRINE HYDROCHLORIDE 5 MG/1
10 TABLET ORAL
Status: DISCONTINUED | OUTPATIENT
Start: 2023-01-22 | End: 2023-01-21

## 2023-01-21 RX ORDER — ALBUTEROL SULFATE 0.83 MG/ML
2.5 SOLUTION RESPIRATORY (INHALATION)
Status: DISCONTINUED | OUTPATIENT
Start: 2023-01-21 | End: 2023-01-21

## 2023-01-21 RX ORDER — ACETAMINOPHEN 325 MG/1
650 TABLET ORAL EVERY 4 HOURS PRN
Status: CANCELLED | OUTPATIENT
Start: 2023-01-21

## 2023-01-21 RX ORDER — ACETYLCYSTEINE 200 MG/ML
2 SOLUTION ORAL; RESPIRATORY (INHALATION) 2 TIMES DAILY
Status: DISCONTINUED | OUTPATIENT
Start: 2023-01-21 | End: 2023-01-21

## 2023-01-21 RX ORDER — DEXTROSE MONOHYDRATE 100 MG/ML
INJECTION, SOLUTION INTRAVENOUS CONTINUOUS PRN
Status: DISCONTINUED | OUTPATIENT
Start: 2023-01-21 | End: 2023-01-21

## 2023-01-21 RX ORDER — CHLORHEXIDINE GLUCONATE ORAL RINSE 1.2 MG/ML
15 SOLUTION DENTAL 2 TIMES DAILY
Status: DISCONTINUED | OUTPATIENT
Start: 2023-01-21 | End: 2023-01-21

## 2023-01-21 RX ORDER — LACTULOSE 10 G/15ML
20 SOLUTION ORAL 3 TIMES DAILY
Status: DISCONTINUED | OUTPATIENT
Start: 2023-01-21 | End: 2023-01-21

## 2023-01-21 RX ORDER — DEXTROSE MONOHYDRATE 100 MG/ML
INJECTION, SOLUTION INTRAVENOUS CONTINUOUS PRN
Status: CANCELLED | OUTPATIENT
Start: 2023-01-21

## 2023-01-21 RX ORDER — VIT B COMP NO.3/FOLIC/C/BIOTIN 1 MG-60 MG
1 TABLET ORAL DAILY
Status: DISCONTINUED | OUTPATIENT
Start: 2023-01-22 | End: 2023-01-21

## 2023-01-21 RX ORDER — DEXTROSE MONOHYDRATE 25 G/50ML
25-50 INJECTION, SOLUTION INTRAVENOUS
Status: DISCONTINUED | OUTPATIENT
Start: 2023-01-21 | End: 2023-04-28 | Stop reason: HOSPADM

## 2023-01-21 RX ORDER — ZOLPIDEM TARTRATE 5 MG/1
5 TABLET ORAL
Status: CANCELLED | OUTPATIENT
Start: 2023-01-21

## 2023-01-21 RX ORDER — MIDODRINE HYDROCHLORIDE 5 MG/1
10 TABLET ORAL
Status: DISCONTINUED | OUTPATIENT
Start: 2023-01-21 | End: 2023-01-21

## 2023-01-21 RX ORDER — NICOTINE POLACRILEX 4 MG
15-30 LOZENGE BUCCAL
Status: CANCELLED | OUTPATIENT
Start: 2023-01-21

## 2023-01-21 RX ORDER — MINERAL OIL/HYDROPHIL PETROLAT
OINTMENT (GRAM) TOPICAL 2 TIMES DAILY PRN
Status: DISCONTINUED | OUTPATIENT
Start: 2023-01-21 | End: 2023-04-28 | Stop reason: HOSPADM

## 2023-01-21 RX ORDER — NALOXONE HYDROCHLORIDE 0.4 MG/ML
0.2 INJECTION, SOLUTION INTRAMUSCULAR; INTRAVENOUS; SUBCUTANEOUS
Status: DISCONTINUED | OUTPATIENT
Start: 2023-01-21 | End: 2023-04-28 | Stop reason: HOSPADM

## 2023-01-21 RX ORDER — HEPARIN SODIUM 1000 [USP'U]/ML
1900 INJECTION, SOLUTION INTRAVENOUS; SUBCUTANEOUS
Status: CANCELLED | OUTPATIENT
Start: 2023-01-21 | End: 2023-02-01

## 2023-01-21 RX ORDER — LACOSAMIDE 10 MG/ML
100 SOLUTION ORAL 2 TIMES DAILY
Status: DISCONTINUED | OUTPATIENT
Start: 2023-01-21 | End: 2023-02-28

## 2023-01-21 RX ORDER — CARBOXYMETHYLCELLULOSE SODIUM 5 MG/ML
1 SOLUTION/ DROPS OPHTHALMIC 3 TIMES DAILY
Status: CANCELLED | OUTPATIENT
Start: 2023-01-21

## 2023-01-21 RX ORDER — BISACODYL 10 MG
10 SUPPOSITORY, RECTAL RECTAL DAILY PRN
Status: DISCONTINUED | OUTPATIENT
Start: 2023-01-21 | End: 2023-01-21

## 2023-01-21 RX ORDER — ONDANSETRON 4 MG/1
4 TABLET, ORALLY DISINTEGRATING ORAL EVERY 6 HOURS PRN
Status: DISCONTINUED | OUTPATIENT
Start: 2023-01-21 | End: 2023-01-21

## 2023-01-21 RX ORDER — DEXTROSE MONOHYDRATE 25 G/50ML
25-50 INJECTION, SOLUTION INTRAVENOUS
Status: DISCONTINUED | OUTPATIENT
Start: 2023-01-21 | End: 2023-01-21

## 2023-01-21 RX ORDER — MIDODRINE HYDROCHLORIDE 5 MG/1
10 TABLET ORAL
Status: CANCELLED | OUTPATIENT
Start: 2023-01-21

## 2023-01-21 RX ORDER — BISACODYL 10 MG
10 SUPPOSITORY, RECTAL RECTAL DAILY PRN
Status: CANCELLED | OUTPATIENT
Start: 2023-01-21

## 2023-01-21 RX ORDER — CALCIUM CARBONATE 500 MG/1
500 TABLET, CHEWABLE ORAL 4 TIMES DAILY PRN
Status: DISCONTINUED | OUTPATIENT
Start: 2023-01-21 | End: 2023-04-28 | Stop reason: HOSPADM

## 2023-01-21 RX ORDER — IPRATROPIUM BROMIDE AND ALBUTEROL SULFATE 2.5; .5 MG/3ML; MG/3ML
3 SOLUTION RESPIRATORY (INHALATION)
Status: DISCONTINUED | OUTPATIENT
Start: 2023-01-22 | End: 2023-01-29

## 2023-01-21 RX ORDER — FOLIC ACID 1 MG/1
1 TABLET ORAL DAILY
Status: DISCONTINUED | OUTPATIENT
Start: 2023-01-22 | End: 2023-01-23

## 2023-01-21 RX ORDER — MINERAL OIL/HYDROPHIL PETROLAT
OINTMENT (GRAM) TOPICAL 2 TIMES DAILY PRN
Status: CANCELLED | OUTPATIENT
Start: 2023-01-21

## 2023-01-21 RX ORDER — NICOTINE POLACRILEX 4 MG
15-30 LOZENGE BUCCAL
Status: DISCONTINUED | OUTPATIENT
Start: 2023-01-21 | End: 2023-01-21

## 2023-01-21 RX ORDER — ZOLPIDEM TARTRATE 5 MG/1
5 TABLET ORAL
Status: DISCONTINUED | OUTPATIENT
Start: 2023-01-21 | End: 2023-01-21

## 2023-01-21 RX ORDER — FAMOTIDINE 20 MG/1
20 TABLET, FILM COATED ORAL 2 TIMES DAILY
Status: DISCONTINUED | OUTPATIENT
Start: 2023-01-21 | End: 2023-01-21

## 2023-01-21 RX ORDER — MINERAL OIL/HYDROPHIL PETROLAT
OINTMENT (GRAM) TOPICAL DAILY
Status: CANCELLED | OUTPATIENT
Start: 2023-01-22

## 2023-01-21 RX ORDER — LIDOCAINE HYDROCHLORIDE 20 MG/ML
JELLY TOPICAL EVERY 4 HOURS PRN
Status: CANCELLED | OUTPATIENT
Start: 2023-01-21

## 2023-01-21 RX ORDER — LEVETIRACETAM 100 MG/ML
1000 SOLUTION ORAL EVERY 24 HOURS
Status: DISCONTINUED | OUTPATIENT
Start: 2023-01-21 | End: 2023-02-28

## 2023-01-21 RX ORDER — MIDODRINE HYDROCHLORIDE 5 MG/1
5 TABLET ORAL
Status: DISCONTINUED | OUTPATIENT
Start: 2023-01-22 | End: 2023-01-27

## 2023-01-21 RX ORDER — ACETAMINOPHEN 325 MG/1
650 TABLET ORAL EVERY 4 HOURS PRN
Status: DISCONTINUED | OUTPATIENT
Start: 2023-01-21 | End: 2023-02-07

## 2023-01-21 RX ORDER — LACTULOSE 10 G/15ML
20 SOLUTION ORAL 2 TIMES DAILY
Status: DISCONTINUED | OUTPATIENT
Start: 2023-01-21 | End: 2023-01-21

## 2023-01-21 RX ORDER — FOLIC ACID 1 MG/1
1 TABLET ORAL DAILY
Status: DISCONTINUED | OUTPATIENT
Start: 2023-01-22 | End: 2023-01-21

## 2023-01-21 RX ORDER — LIDOCAINE 40 MG/G
CREAM TOPICAL
Status: DISCONTINUED | OUTPATIENT
Start: 2023-01-21 | End: 2023-01-21

## 2023-01-21 RX ORDER — MIDODRINE HYDROCHLORIDE 5 MG/1
5 TABLET ORAL
Status: DISCONTINUED | OUTPATIENT
Start: 2023-01-22 | End: 2023-01-21

## 2023-01-21 RX ORDER — LIDOCAINE HYDROCHLORIDE 20 MG/ML
JELLY TOPICAL EVERY 4 HOURS PRN
Status: DISCONTINUED | OUTPATIENT
Start: 2023-01-21 | End: 2023-01-21

## 2023-01-21 RX ORDER — NITROGLYCERIN 0.4 MG/1
0.4 TABLET SUBLINGUAL EVERY 5 MIN PRN
Status: DISCONTINUED | OUTPATIENT
Start: 2023-01-21 | End: 2023-04-28 | Stop reason: HOSPADM

## 2023-01-21 RX ORDER — NYSTATIN 100000/ML
500000 SUSPENSION, ORAL (FINAL DOSE FORM) ORAL 4 TIMES DAILY
Status: CANCELLED | OUTPATIENT
Start: 2023-01-21

## 2023-01-21 RX ORDER — ONDANSETRON 2 MG/ML
4 INJECTION INTRAMUSCULAR; INTRAVENOUS EVERY 6 HOURS PRN
Status: CANCELLED | OUTPATIENT
Start: 2023-01-21

## 2023-01-21 RX ORDER — OXYCODONE HCL 5 MG/5 ML
5 SOLUTION, ORAL ORAL EVERY 4 HOURS PRN
Status: DISCONTINUED | OUTPATIENT
Start: 2023-01-21 | End: 2023-01-23

## 2023-01-21 RX ORDER — LACTULOSE 10 G/15ML
20 SOLUTION ORAL 2 TIMES DAILY
Status: DISCONTINUED | OUTPATIENT
Start: 2023-01-21 | End: 2023-02-05

## 2023-01-21 RX ORDER — LEVETIRACETAM 100 MG/ML
1000 SOLUTION ORAL EVERY 24 HOURS
Status: CANCELLED | OUTPATIENT
Start: 2023-01-21

## 2023-01-21 RX ORDER — DOCUSATE SODIUM 100 MG/1
100 CAPSULE, LIQUID FILLED ORAL 2 TIMES DAILY PRN
Status: DISCONTINUED | OUTPATIENT
Start: 2023-01-21 | End: 2023-01-21

## 2023-01-21 RX ORDER — ACETAMINOPHEN 325 MG/1
975 TABLET ORAL EVERY 8 HOURS SCHEDULED
Status: CANCELLED | OUTPATIENT
Start: 2023-01-21

## 2023-01-21 RX ORDER — NALOXONE HYDROCHLORIDE 0.4 MG/ML
0.4 INJECTION, SOLUTION INTRAMUSCULAR; INTRAVENOUS; SUBCUTANEOUS
Status: DISCONTINUED | OUTPATIENT
Start: 2023-01-21 | End: 2023-04-28 | Stop reason: HOSPADM

## 2023-01-21 RX ORDER — ALBUTEROL SULFATE 0.83 MG/ML
2.5 SOLUTION RESPIRATORY (INHALATION)
Status: CANCELLED | OUTPATIENT
Start: 2023-01-21

## 2023-01-21 RX ORDER — LIDOCAINE 40 MG/G
CREAM TOPICAL
Status: DISCONTINUED | OUTPATIENT
Start: 2023-01-21 | End: 2023-04-28 | Stop reason: HOSPADM

## 2023-01-21 RX ORDER — ALBUTEROL SULFATE 0.83 MG/ML
2.5 SOLUTION RESPIRATORY (INHALATION)
Status: DISCONTINUED | OUTPATIENT
Start: 2023-01-21 | End: 2023-04-28 | Stop reason: HOSPADM

## 2023-01-21 RX ORDER — ACETYLCYSTEINE 200 MG/ML
2 SOLUTION ORAL; RESPIRATORY (INHALATION) 2 TIMES DAILY
Status: DISCONTINUED | OUTPATIENT
Start: 2023-01-21 | End: 2023-01-29

## 2023-01-21 RX ORDER — CARBOXYMETHYLCELLULOSE SODIUM 5 MG/ML
1 SOLUTION/ DROPS OPHTHALMIC
Status: DISCONTINUED | OUTPATIENT
Start: 2023-01-21 | End: 2023-03-05

## 2023-01-21 RX ORDER — LACTULOSE 10 G/15ML
20 SOLUTION ORAL 2 TIMES DAILY
Status: CANCELLED | OUTPATIENT
Start: 2023-01-21

## 2023-01-21 RX ORDER — MIDODRINE HYDROCHLORIDE 5 MG/1
5 TABLET ORAL
Status: CANCELLED | OUTPATIENT
Start: 2023-01-21

## 2023-01-21 RX ORDER — QUETIAPINE FUMARATE 25 MG/1
75 TABLET, FILM COATED ORAL AT BEDTIME
Status: DISCONTINUED | OUTPATIENT
Start: 2023-01-21 | End: 2023-01-21

## 2023-01-21 RX ORDER — ONDANSETRON 4 MG/1
4 TABLET, ORALLY DISINTEGRATING ORAL EVERY 6 HOURS PRN
Status: CANCELLED | OUTPATIENT
Start: 2023-01-21

## 2023-01-21 RX ORDER — ACETYLCYSTEINE 200 MG/ML
2 SOLUTION ORAL; RESPIRATORY (INHALATION) 2 TIMES DAILY
Status: CANCELLED | OUTPATIENT
Start: 2023-01-21

## 2023-01-21 RX ORDER — ONDANSETRON 2 MG/ML
4 INJECTION INTRAMUSCULAR; INTRAVENOUS EVERY 6 HOURS PRN
Status: DISCONTINUED | OUTPATIENT
Start: 2023-01-21 | End: 2023-01-21

## 2023-01-21 RX ORDER — ACETAMINOPHEN 325 MG/1
650 TABLET ORAL EVERY 4 HOURS PRN
Status: DISCONTINUED | OUTPATIENT
Start: 2023-01-21 | End: 2023-01-21

## 2023-01-21 RX ORDER — ACETAMINOPHEN 650 MG/1
650 SUPPOSITORY RECTAL EVERY 8 HOURS SCHEDULED
Status: DISCONTINUED | OUTPATIENT
Start: 2023-01-21 | End: 2023-01-21

## 2023-01-21 RX ORDER — DOCUSATE SODIUM 100 MG/1
100 CAPSULE, LIQUID FILLED ORAL 2 TIMES DAILY PRN
Status: CANCELLED | OUTPATIENT
Start: 2023-01-21

## 2023-01-21 RX ORDER — NICOTINE POLACRILEX 4 MG
15-30 LOZENGE BUCCAL
Status: DISCONTINUED | OUTPATIENT
Start: 2023-01-21 | End: 2023-04-28 | Stop reason: HOSPADM

## 2023-01-21 RX ORDER — FOLIC ACID 1 MG/1
1 TABLET ORAL DAILY
Status: CANCELLED | OUTPATIENT
Start: 2023-01-22

## 2023-01-21 RX ORDER — LEVETIRACETAM 100 MG/ML
1000 SOLUTION ORAL EVERY 24 HOURS
Status: DISCONTINUED | OUTPATIENT
Start: 2023-01-21 | End: 2023-01-21

## 2023-01-21 RX ORDER — MINERAL OIL/HYDROPHIL PETROLAT
OINTMENT (GRAM) TOPICAL DAILY
Status: DISCONTINUED | OUTPATIENT
Start: 2023-01-22 | End: 2023-04-07

## 2023-01-21 RX ORDER — CHLORHEXIDINE GLUCONATE ORAL RINSE 1.2 MG/ML
15 SOLUTION DENTAL 2 TIMES DAILY
Status: CANCELLED | OUTPATIENT
Start: 2023-01-21

## 2023-01-21 RX ORDER — LACOSAMIDE 10 MG/ML
100 SOLUTION ORAL 2 TIMES DAILY
Status: DISCONTINUED | OUTPATIENT
Start: 2023-01-21 | End: 2023-01-21

## 2023-01-21 RX ORDER — FLUCONAZOLE 40 MG/ML
200 POWDER, FOR SUSPENSION ORAL AT BEDTIME
Status: DISCONTINUED | OUTPATIENT
Start: 2023-01-21 | End: 2023-01-23

## 2023-01-21 RX ORDER — FLUCONAZOLE 200 MG/1
200 TABLET ORAL AT BEDTIME
Status: DISCONTINUED | OUTPATIENT
Start: 2023-01-21 | End: 2023-01-21

## 2023-01-21 RX ORDER — ALBUTEROL SULFATE 0.83 MG/ML
2.5 SOLUTION RESPIRATORY (INHALATION)
Status: DISCONTINUED | OUTPATIENT
Start: 2023-01-22 | End: 2023-01-21

## 2023-01-21 RX ORDER — FLUORIDE TOOTHPASTE
10 TOOTHPASTE DENTAL 4 TIMES DAILY PRN
Status: DISCONTINUED | OUTPATIENT
Start: 2023-01-21 | End: 2023-01-21

## 2023-01-21 RX ORDER — VIT B COMP NO.3/FOLIC/C/BIOTIN 1 MG-60 MG
1 TABLET ORAL DAILY
Status: CANCELLED | OUTPATIENT
Start: 2023-01-22

## 2023-01-21 RX ORDER — LACOSAMIDE 10 MG/ML
100 SOLUTION ORAL 2 TIMES DAILY
Status: CANCELLED | OUTPATIENT
Start: 2023-01-21

## 2023-01-21 RX ORDER — DEXTROSE MONOHYDRATE 25 G/50ML
25-50 INJECTION, SOLUTION INTRAVENOUS
Status: CANCELLED | OUTPATIENT
Start: 2023-01-21

## 2023-01-21 RX ORDER — ACETAMINOPHEN 325 MG/10.15ML
650 LIQUID ORAL EVERY 4 HOURS PRN
Status: DISCONTINUED | OUTPATIENT
Start: 2023-01-21 | End: 2023-01-21

## 2023-01-21 RX ORDER — ACETAMINOPHEN 325 MG/1
975 TABLET ORAL EVERY 8 HOURS SCHEDULED
Status: DISCONTINUED | OUTPATIENT
Start: 2023-01-21 | End: 2023-01-21

## 2023-01-21 RX ORDER — B COMPLEX C NO.10/FOLIC ACID 900MCG/5ML
5 LIQUID (ML) ORAL DAILY
Status: DISCONTINUED | OUTPATIENT
Start: 2023-01-22 | End: 2023-01-30

## 2023-01-21 RX ORDER — FLUORIDE TOOTHPASTE
10 TOOTHPASTE DENTAL 4 TIMES DAILY PRN
Status: CANCELLED | OUTPATIENT
Start: 2023-01-21

## 2023-01-21 RX ORDER — NYSTATIN 100000/ML
500000 SUSPENSION, ORAL (FINAL DOSE FORM) ORAL 4 TIMES DAILY
Status: DISCONTINUED | OUTPATIENT
Start: 2023-01-21 | End: 2023-01-21

## 2023-01-21 RX ORDER — ACETAMINOPHEN 650 MG/1
650 SUPPOSITORY RECTAL EVERY 4 HOURS PRN
Status: DISCONTINUED | OUTPATIENT
Start: 2023-01-21 | End: 2023-02-07

## 2023-01-21 RX ORDER — CARBOXYMETHYLCELLULOSE SODIUM 5 MG/ML
1 SOLUTION/ DROPS OPHTHALMIC 3 TIMES DAILY
Status: DISCONTINUED | OUTPATIENT
Start: 2023-01-21 | End: 2023-01-21

## 2023-01-21 RX ADMIN — MIDODRINE HYDROCHLORIDE 5 MG: 5 TABLET ORAL at 18:17

## 2023-01-21 RX ADMIN — LACOSAMIDE 100 MG: 10 SOLUTION ORAL at 09:55

## 2023-01-21 RX ADMIN — ACETAMINOPHEN 975 MG: 325 TABLET, FILM COATED ORAL at 13:46

## 2023-01-21 RX ADMIN — FOLIC ACID 1 MG: 1 TABLET ORAL at 09:55

## 2023-01-21 RX ADMIN — NYSTATIN 500000 UNITS: 100000 SUSPENSION ORAL at 18:15

## 2023-01-21 RX ADMIN — RIFAXIMIN 550 MG: 550 TABLET ORAL at 09:55

## 2023-01-21 RX ADMIN — LACOSAMIDE 100 MG: 10 SOLUTION ORAL at 23:07

## 2023-01-21 RX ADMIN — Medication 1 DROP: at 13:48

## 2023-01-21 RX ADMIN — ACETAMINOPHEN 650 MG: 650 SOLUTION ORAL at 18:29

## 2023-01-21 RX ADMIN — MIDODRINE HYDROCHLORIDE 5 MG: 5 TABLET ORAL at 13:45

## 2023-01-21 RX ADMIN — Medication 40 MG: at 09:55

## 2023-01-21 RX ADMIN — Medication 1 TABLET: at 09:55

## 2023-01-21 RX ADMIN — TACROLIMUS 1 MG: 5 CAPSULE ORAL at 08:02

## 2023-01-21 RX ADMIN — MIDODRINE HYDROCHLORIDE 5 MG: 5 TABLET ORAL at 09:56

## 2023-01-21 RX ADMIN — RIFAXIMIN 550 MG: 550 TABLET ORAL at 23:08

## 2023-01-21 RX ADMIN — DOCUSATE SODIUM 100 MG: 50 LIQUID ORAL at 18:29

## 2023-01-21 RX ADMIN — TACROLIMUS 1 MG: 5 CAPSULE ORAL at 18:16

## 2023-01-21 RX ADMIN — ACETAMINOPHEN 975 MG: 325 TABLET, FILM COATED ORAL at 05:25

## 2023-01-21 RX ADMIN — LACTULOSE 20 G: 20 SOLUTION ORAL at 09:55

## 2023-01-21 ASSESSMENT — ACTIVITIES OF DAILY LIVING (ADL)
ADLS_ACUITY_SCORE: 67
ADLS_ACUITY_SCORE: 45
ADLS_ACUITY_SCORE: 67
ADLS_ACUITY_SCORE: 35
ADLS_ACUITY_SCORE: 67

## 2023-01-21 NOTE — PROCEDURES
HD Progress Note     Assessment: Pt AAO x4, denied c/o, trach capped, breathing on his own, room air. No edema noted.     Vascular Access: RIJ catheter patent, aspirated and flushed well, however art collapsing at initiation of tx and lines reversed. .  Ports locked with Heparin post Tx.     Dialyzer/Rinse: 160NRe / clear     HD time: 3hrs     HD fluid removal goal: 1kg or less     Treatment:Pt hemodynamically stable during run. SBP in 100's. Goal at 1 kg per Jie Arora NP. No c/o cramping today.      Fluid Removed Total: 1000 ml               Net:  600 ml     Interventions: VS monitoring q 15 min and as needed.     Plan: HD q MWF

## 2023-01-21 NOTE — PLAN OF CARE
Pt refusing Chlorhexidine and Nystatin mouth rinses. Some morning meds given late due to patients refusal while eating breakfast and then later napping.     Problem: Malnutrition  Goal: Improved Nutritional Intake  Outcome: Not Progressing  Problem: Enteral Nutrition  Goal: Feeding Tolerance  Outcome: Not Progressing    Pt eating poorly. Ate about 25% of breakfast foods and half of supplement. Only ate a few bites of lunch. Pt refusing tube feedings, despite RN education on need for tube feedings and visitor encouragement for pt to eat or take the tube feedings. Pt states his abdomen feels too full to eat or take tube feedings. Pt due for a paracentesis and a chest tube placement. Plan is to transfer to [facility] this evening to have these procedures done.    Problem: Chronic Kidney Disease  Goal: Acceptable Pain Control  Outcome: Progressing  Intervention: Prevent or Manage Pain     Pt states pain is ok this morning, but then c/o back pain 8/10 this afternoon. Scheduled Tylenol given.    Problem: Diarrhea  Goal: Effective Diarrhea Management  Outcome: Progressing  Intervention: Manage Diarrhea    Pt continues to have rectal tube in place. Currently on Lactulose. Small amounts of bypassing at times around rectal tube.    Starla Ocampo, PETER 1/21/2023

## 2023-01-21 NOTE — PLAN OF CARE
Problem: Artificial Airway  Goal: Effective Communication  Outcome: Progressing  Goal: Optimal Device Function  Outcome: Progressing  Goal: Absence of Device-Related Skin or Tissue Injury  Outcome: Progressing    RT PROGRESS NOTE     DATA:     CURRENT SETTINGS:             TRACH TYPE / SIZE:  # 7 BIVONA 1/23             MODE:   capped              FIO2:   RA     ACTION:             THERAPIES:                SUCTION:                           FREQUENCY:   1                        AMOUNT:   scant-small                         CONSISTENCY:   thin                         COLOR:   white-pale yellow              SPONTANEOUS COUGH EFFORT/STRENGTH OF EFFORT (not elicited by suctioning):  weak cough                               WEANING PHASE:   3                        WEAN MODE:    capped                         WEAN TIME:   as ashley                         END WEAN REASON:   on going      RESPONSE:     Blood pressure 115/60, pulse 105, temperature 98.5  F (36.9  C), temperature source Oral, resp. rate 18, weight 82.4 kg (181 lb 11.2 oz), SpO2 96 %.                      NOTE / PLAN:   remains on RA. Pt refused RT treatment this shift. BS clear.

## 2023-01-21 NOTE — PLAN OF CARE
Problem: Plan of Care - These are the overarching goals to be used throughout the patient stay.    Goal: Absence of Hospital-Acquired Illness or Injury  Outcome: Progressing  Intervention: Identify and Manage Fall Risk  Recent Flowsheet Documentation  Taken 2023 by Jesse Sheriff Jr, RN  Safety Promotion/Fall Prevention: activity supervised  Intervention: Prevent Infection  Recent Flowsheet Documentation  Taken 2023 by Jesse Sheriff Jr, RN  Infection Prevention:    hand hygiene promoted    rest/sleep promoted     Problem: Pain Acute  Goal: Optimal Pain Control and Function  Outcome: Progressing  Intervention: Prevent or Manage Pain  Recent Flowsheet Documentation  Taken 2023 by Jesse Sheriff Jr, RN  Medication Review/Management: medications reviewed     Problem: Malnutrition  Goal: Improved Nutritional Intake  Outcome: Progressing     Problem: Enteral Nutrition  Goal: Absence of Aspiration Signs and Symptoms  Outcome: Progressing     Patient is alert and oriented, denied pain, tolerates room air, no /SOB seen as well. Patient consumed less than 50% of his meals after dialysis, and refused back up bolus feedings. Spot checking of his blood glucose every 3hrs done and all results are within range.

## 2023-01-21 NOTE — DISCHARGE SUMMARY
LTACH  Hospitalist Discharge Summary      Date of Admission:  12/29/2022  Date of Discharge:  1/21/2023   Discharging Provider: SPARKLE CRUZ MD  Discharge Service: Hospitalist Service    Discharge Diagnoses   Principal Problem:    Acute on chronic respiratory failure with hypoxia (H)  Active Problems:    Acquired immunocompromised state (H)    Cirrhosis of liver (H)    NAFLD (nonalcoholic fatty liver disease)    Liver transplanted (H)    Immunosuppression (H)    SBP (spontaneous bacterial peritonitis) (H)    Acute renal failure, unspecified acute renal failure type (H)    Critical illness myopathy    Seizures (H)    History of sudden cardiac arrest, PEA    ESRD (end stage renal disease) on dialysis (H)    Anemia of chronic disease due to ESRD, cirrhosis and hypersplenism    Parapneumonic effusion        Follow-ups Needed After Discharge   Discharged to [facility] for higher level of care    Unresulted Labs Ordered in the Past 30 Days of this Admission     Date and Time Order Name Status Description    1/19/2023  8:10 AM Prepare red blood cells (unit) Preliminary     1/11/2023  2:01 AM Fungal or Yeast Culture Routine Preliminary     1/10/2023  6:00 PM Acid-Fast Bacilli Culture and Stain In process     1/8/2023  4:11 PM Prepare red blood cells (unit) Preliminary     1/8/2023  4:11 PM Prepare red blood cells (unit) Preliminary     12/22/2022 12:43 PM Acid-Fast Bacilli Culture and Stain In process     12/21/2022  1:08 PM Serum Collection (Accompanies: [id number] or [id number] CSF Testing) In process     12/21/2022  1:08 PM Oligoclonal banding In process     12/16/2022  4:15 PM Prepare red blood cells (unit) Preliminary     12/16/2022  7:30 AM Prepare red blood cells (unit) Preliminary     12/16/2022  7:30 AM Prepare red blood cells (unit) Preliminary           Discharge Disposition   Discharged to short-term care facility. Discharged to [facility] for higher level of care  Condition at discharge:  Serious      Hospital Course      Brief history:  Blanco Osborne is an 57 year old male who is transferred from [facility] f to [facility] for  IV antibiotic treatment. Patient has multiple medical problems including history of liver transplant 2016, PAF on chronic anticoagulation, history of DM2, CVA, HTN, CHRISTIAN on BiPAP, and history of alcohol abuse in the past causing liver damage ending with liver transplant.  About 6 weeks ago he had respiratory arrest and was diagnosed with infection with parainfluenza and strep pneumoniae and acute on chronic renal insufficiency ending with CRF needing 3/week hemodialysis.  During this period he was diagnosed with cirrhosis of transplanted liver and hyperammonia.  Currently he is on Lactulose and Rifaximin.      On 12/14/22, due to CIP, he was transferred to [place] [facility] for the first time with Trach, PEG and Rt chest tube.    On 12/16/2022 patient was transferred back to [facility] due to respiratory insufficiency secondary to hydropneumothorax and drainage of 900 cc bloody pleural effusion, bloody stool and transfusion of 2 units PRBC.    On 12/20/2022 he had another episode of PEA arrest followed by CPR x2 minutes and possibly had seizure activity in [facility]. 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  He has been on ventilator and has improved to trach dome tolerating up to 6 hours so far. He had chest tube which was removed.    He had SBP with growth of lactobacillus and is currently on Unasyn which will be switched to Augmentin orally through 1/12/2023.    Pleural effusion grew Candida and is on micafungin.  He is also on  due to cirrhosis and hyperammonia.  He is anemic and is on Retacrit.   He is on lacosamide and Keppra due to history of seizure.  There is question of him drinking again causing him cirrhosis of the transplanted liver.  He was  discharged to LTACH on 12/9/2023 to continue vent weaning trials, therapies, IV antibiotic course.     LTACH course:  1/10/2023-1/15/2023.  Dialyzing. 1/12,Overnight patient had to be resuscitated with 1 L normal saline bolus after BPA sepsis fired noted to have lactic acid of 2.3. Following morning Hb is 6.6.  Transfused 1 unit of packed red blood cells.1/13,Patient had paracentesis and thoracentesis about 7500 cc of ascitic fluid and 50 cc of pleural fluid yielded. Cytology pending.1/15. Just about the same compared to yesterday. Hb fairly stable. No new changes at this time, continue with current medical management     1/16-1/21: Hgb 7.9 after transfusion and 8.1 on 1/18.  Transfused 1 unit PRBC 1/15/2023.  Apixaban resumed 1/17 since no further tracheal bleeding however currently on hold due to possible need for chest tube and lytics.  Right thoracentesis 1/13 fluid considered exudative, likely parapneumonic complicated effusion per Pulmonology.  Clinically, no fevers, normal WBC, right pleural fluid cultures NGTD.  ID consulted for antibiotic recommendations.  CT chest/abdomen/pelvis 1/18, shows concerns for infected parapneumonic effusions and SBP.  Repeat labs: Ammonia 32,  from 22, procalcitonin 1.64 from 1.81.  CT findings discussed with Pulmonology team with 3 different pulmonologist including (Dr. Monge, Dr. Chu and Dr. Jay) with agreement that patient needs chest tube and possible lytics and therefore transfer to higher level of care.  He will also need repeat diagnostic/therapeutic paracentesis for ascites culture for r/o SBP as well.  Due to unavailability of beds within  system, patient has been awaiting bed placement since 1/19.   Holding off starting antibiotics pending likely transfer soon, preferably obtain pleural and paracentesis cultures before starting antibiotics.  Call placed to SOC 1/19, 1/20 and 1/21 for finding bed availability within [facility] at [facility], [facility],  [facility].  Patient was transferred to [facility] on on 1/21/2022 when bed was available.  Discussed care plan with accepting hospitalist: Dr. Pal and Thoracic Surgery, Dr. Jackson.  Patient and wife updated who concur and agree with the plan as outlined planned above.    Problem based hospital course as below:    Acute now chronic respiratory failure requiring tracheostomy.  Initial event was parainfluenza and strep pneumo pneumonia.  Had failed extubation x2 and tracheostomy performed last hospitalization.  Had additional complications of bilateral pneumothoraces.  (Most recently was a hydropneumothorax where fluid grew Candida.. Chest tube was placed and removed . Had decent tolerance for pressure support and trach dome but after few hours and trach dome 12/27 had a pretty significant episode of desaturation.   -Weaning ventilator per Pulmonology.  Currently on phase 3  -Tracheostomy care per RT    Pneumonia  ARDS  Right pneumothorax  Bilateral pleural effusions  Treated for ARDS pneumonia, Aspergillus pneumonia during prior hospitalization and right chest tube now removed.  Left pleural fluid positive for Candida parapsilosis treated with IV micafungin and currently on fluconazole, to finish 4-week course.  Concern for right complicated parapneumonic effusion. S/p thoracentesis 11/26 and 1/13.  Right thoracentesis 1/13 consistent with exudative effusion with elevated LDH, high neutrophils, cultures show NGTD.    -Pulmonology considering intrapleural lytics or surgical intervention if any further deterioration, transfer to higher level of care  -Continue fluconazole to finish course for left pleural fluid Candida parapsilosis   -ID consulted 1/17 for further antibiotic recommendations  -CT chest/abdomen/pelvis 1/18: Bilateral small loculated effusions with associated pleural enlargement concerning for infected effusions, large volume ascites with peritoneal enhancement concerning for  SBP.  -Arranged for transfer to higher level of care for chest tube placement, possible lytics, diagnostic/therapeutic paracentesis for SBP.  Patient discharged to [facility] as above on 1/21/2023    History PEA  Hypotension  PEA arrest prior to his previous admission and 1 episode of PEA associated with desaturation on 12/20.  No structural cardiac disease but does have A. Fib which neurologist thought might have been contributory to his embolic strokes.  Anticoagulation been restarted with stable labs although high risk for bleeding seconday to thrombocytopenia.  Also has developed baseline hypotension and is on midodrine 10 mg 3 times daily.  -Continue current midodrine dose      Infectious disease  At prior hospital:  1. 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 4.  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  Have had some conversations within our group and with transplant ID and general infectious disease.  General consensus is that ganciclovir probably could be discontinued  2. H/o Strep/Parainfluenza infection last hospitalization. Completed treatment course  3. 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  4.  Lactobacillus growing in the broth 4 days after paracentesis.  Cell count was very low.  5.  Candida in left pleural fluid cultures.  Sensitivities not resulted.  6.  Immunosuppressed on tacrolimus.  Discussed with transplant service at the [place] who felt that given the absence of rejection on his most recent biopsy and ongoing issues with infection continuing with 1 twice daily tacrolimus is the best current option.  They have seen his subtherapeutic trough levels.  -Received Unasyn for 2 weeks total.  Changed to Augmentin, finished course on 1/12/2023.  -At least 4 weeks of antimicrobial for the Candida.   -Continue tacrolimus as above and tapering steroids  -Remains off ganciclovir    S/p Liver transplant 2016  Cirrhosis with ascites  Subacute bacterial peritonitis  Main manifestations of this are hepatic  encephalopathy and ascites.  Hepatologist at [place] felt that most likely reason for the cirrhosis was metabolic syndrome or alcohol intake.  There was no evidence of rejection on biopsy and there was some evidence of regeneration. Paracentesis done on 12/22/2022 showed lactobacillus indicating SBP, treated with Unasyn and Augmentin, finished 2-week course 1/12/2023.  Requires large-volume paracentesis periodically for recurring ascites.    -Paracentesis 1/13/2023 yielded about 7500 cc.  Cytology studies pending.  Ascites fluid cultures NGTD.    -Repeat CT abdomen/pelvis on 1/18 concerning for SBP given peritoneal enhancement.  -Repeat labs 1/19:  from 22, procalcitonin 1.64 from 1.81.  Ammonia 32.  Holding off starting antibiotics pending likely transfer soon, preferably obtain pleural and paracentesis cultures before starting antibiotics.    -Continue intermittent paracentesis as needed if develops symptomatic ascites.    -Further treatment for recurrent ascites with TIPS deferred to his Liver Transplant team and/or Hepatology  -He has an appointment on 4/21/2023 with Hepatology, Dr. Simms  -Tacrolimus 1 mg BID and tapering steroids   -Lactulose and Rifaximin as above    Seizure after hypoxic event.  This is in the context of baseline multifactorial encephalopathy secondary to his ongoing critical illness and prior cardiac arrests and prior strokes and cirrhosis with hepatic encephalopathy. MRI of head of 12/20/22 reveals no PRES or leptomeningeal enhancement but scattered.  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   - Continue with  Keppra  indefinitely.  Neurology follow-up once discharge from LTACH.    Paroxysmal atrial fibrillation  Remains in sinus rhythm.  On anticoagulation with apixaban indefinitely for stroke prophylaxis.  Anticoagulation held since 1/6 due to tracheal bleeding noted during suctioning.  Apixaban was resumed 1/17 since no  further tracheal bleeding however held again on 1/18 for possible chest tube placement.  -Continue anticoagulation indefinitely for secondary stroke prevention with apixaban  -Apixaban on hold for now since 1/18 for possible chest tube placement.      ESRD: Now on iHD.  No return of renal function.  - MWF schedule     Acute anemia  Status post 1 unit PRBCs transfusion 1/12/2023  -Repeat Hb 7.8, 1/12/2023  -Hgb 8.1 on 1/18.  Spuriously low hemoglobin of 5.9 drawn from PICC line earlier  -No evidence of overt bleeding.  -Anemia most likely secondary to critical illness/chronic disease, chronic renal disease  -Transfuse packed red blood cells to keep Hb> 7  -Monitor H&H.    Diabetes mellitus type 2  Titrating insulin for hypoglycemia since patient sometimes refuses to eat meals leading to subsequent hypoglycemia.   2. He is on steroids which are weaning  Plan:  -Lantus decreased to 10 units from 20 given relatively low glucose on 1/17.  Further decreased to 5 units daily on 1/19  -Hypoglycemic protocol in place    Moderate malnutrition, protein and calorie type.  -Continue with tube feeds via PEG tube  -Nutritionist consulted and following     Pancytopenia due to cirrhosis, ESRD and hypersplenism. WBC count has improved.  However he needs frequent PRBC transfusion due Hb <7.0.  Patient is on anticoagulation for PAF.  Currently in sinus rhythm.    -Monitor CBC, transfuse PRBC as needed for hemoglobin <7 as above       Consultations This Hospital Stay   PULMONARY IP CONSULT  SPEECH LANGUAGE PATH ADULT IP CONSULT  NUTRITION SERVICES ADULT IP CONSULT  PHARMACY IP CONSULT  WOUND OSTOMY CONTINENCE NURSE  IP CONSULT  WOUND OSTOMY CONTINENCE NURSE  IP CONSULT  SPEECH LANGUAGE PATH ADULT IP CONSULT  SPEAKING VALVE WITH CUFF DEFLATION IP CONSULT  PHYSICAL THERAPY ADULT IP CONSULT  OCCUPATIONAL THERAPY ADULT IP CONSULT  PHARMACY IP CONSULT  SPEECH LANGUAGE PATH ADULT IP CONSULT  INTERVENTIONAL RADIOLOGY ADULT/PEDS IP  CONSULT  PHARMACY IP CONSULT  INFECTIOUS DISEASES IP CONSULT  INTERVENTIONAL RADIOLOGY ADULT/PEDS IP CONSULT  INTERVENTIONAL RADIOLOGY ADULT/PEDS IP CONSULT    Code Status   Full Code    Time Spent on this Encounter   I, SPARKLE CRUZ MD, personally saw the patient today and spent greater than 30 minutes discharging this patient.       SPARKLE CRUZ MD  [facility] [address] [place] [address]  Phone: [phone]  Fax: [phone]  ______________________________________________________________________    Physical Exam   Vital Signs: Temp: 97.6  F (36.4  C) Temp src: Oral BP: 96/56 Pulse: 100   Resp: 18 SpO2: 96 % O2 Device: None (Room air)    Weight: 181 lbs 11.2 oz  GENERAL: Frail, debilitated.  Alert, oriented, conversant, in no distress.   EYES: Normal conjunctiva. Sclera anicteric.   NECK: Supple, no lymph adenopathy. JVP is not distended.  Trach in place  MOUTH: No lesions, exudate or erythema.  THROAT: Normal mucosa. No exudate or erythema.   LUNGS: Clear to auscultation. No ronchi or crackles.  Diminished air entry bilaterally.   HEART: S1 S2, Rate and rhythm is regular. No murmurs  ABDOMEN: Moderately distended with ascites, nontender.  Bowel sounds positive  EXTREMITIES: No pitting edema.   SKIN: Ecchymotic spots around neck.  No ulcers.   NEUROLOGIC: Alert and oriented x3. Speech fluent and normal. Clear mentation. Motor, sensory and cranial exam is grossly intact andsymmetric.          Primary Care Physician   Physician No Ref-Primary    Discharge Orders   No discharge procedures on file.    Significant Results and Procedures   Results for orders placed or performed during the hospital encounter of 12/29/22   XR Chest Port 1 View    Narrative    EXAM: XR CHEST PORT 1 VIEW  LOCATION: [facility]  DATE/TIME: 1/4/2023 9:21 AM    INDICATION: hypoxic resp failure; intermittent bloody trach secretions; orthopena; hx b l pleural effusions and b l  PTX  COMPARISON: Portable AP view the chest 12/27/2022; CT pulmonary angiogram 12/20/2022      Impression    IMPRESSION:     Right PICC terminates in the middle third of the SVC. Tunneled right jugular approach dialysis catheter terminates in the right atrium. Tracheostomy resides within the tracheal air column.    Persistent shallow lung inflation. Territories of bibasilar atelectasis are similar. Small right pleural effusion visible in the right lateral sulcus and layering into the interlobar fissures is similar. No enlarging pleural effusion. No pneumothorax.    Unchanged heart and mediastinal contours. Unchanged oblique interface just lateral to the dialysis catheter/SVC and corresponds to rounded atelectasis on prior CT.   CTA Chest Abdomen Pelvis w Contrast    Narrative    EXAM: CTA CHEST ABDOMEN PELVIS W CONTRAST  LOCATION: [facility]  DATE/TIME: 1/6/2023 11:56 PM    INDICATION: Acute blood loss, worsening bloody tracheal secretions, abdomen more distended, rule out other source of potential bleed.  COMPARISON: CT of the chest 12/20/2022  TECHNIQUE: CT angiogram chest abdomen pelvis during arterial phase of injection of IV contrast. 2D and 3D MIP reconstructions were performed by the CT technologist. Dose reduction techniques were used.   CONTRAST: Isovue 370 100 mL    FINDINGS:   CT ANGIOGRAM CHEST, ABDOMEN, AND PELVIS: There are mild calcifications seen with no dissection, serge intimal tearing, aneurysm, high-grade stenosis, or intraluminal clot identified. There is good flow extending into both groins.    LUNGS AND PLEURA: There is a mild to moderate-sized right pleural effusion and a mild left pleural effusion with mild to moderate bilateral atelectasis in the lower lobes bilaterally with no central airway obstruction. There is a benign calcified   granuloma seen in the right lung. The bilateral pleural effusions may be chronic in nature given there appears to be some thin  enhancement involving the periphery of both pleural effusions which may represent a loculated component. There is a minute   amount of gas seen associated with the left pleural effusion and this is presumed to be iatrogenic in nature. There is an area of linear increased density in the anterior medial aspect of the right lower lobe appears slightly smaller than prior, the   12/20/2022, and of decreased density and this likely represents sequelae of the prior suggested hematoma.    MEDIASTINUM/AXILLAE: There is a tracheostomy which appears to be in good position. 4.8 x 6.4 mm low-density lesion in the left thyroid lobe with Hounsfield units most typical for a small cyst. Minimal filling defect lower aspect of the trachea to the   right of midline which is presumed to represent sputum. Central line is seen with distal tip within the right atrium. The main pulmonary vein is distended at 4.0 cm and likely represents sequelae of pulmonary arterial hypertension.    CORONARY ARTERY CALCIFICATION: Moderate.    HEPATOBILIARY: The gallbladder is surgically absent. The bile ducts and liver are normal.    PANCREAS: Normal.    SPLEEN: Splenomegaly with the spleen measuring 18.1 cm greatest longitudinal dimension. Mild scattered varicosities. These findings can be associated with portal venous hypertension.    ADRENAL GLANDS: Normal.    KIDNEYS/BLADDER: Bilateral atrophy of the kidneys with no hydronephrosis. The bladder is deflated likely secondary to pressure from the ascites.     BOWEL: There is an endorectal catheter seen. This appears to be in good position. Slight findings of diverticulosis with no evidence for diverticulitis. Percutaneous gastrostomy tube and this appears be in good position. There is a large amount of   abdominal and pelvic ascites. There is significant abnormal stranding of the mesenteric fat/omentum which may be inflammatory or neoplastic in nature.    LYMPH NODES: Normal.    PELVIC ORGANS:  Normal.    MUSCULOSKELETAL: Moderate multilevel degenerative changes are seen most marked in the thoracolumbar spine. Slight anterior wedging of the L1 vertebral body may represent an old-appearing compression fracture with no retropulsion. Old rib fractures.      Impression    IMPRESSION:  1. No obvious active bleeding identified.  2. Large amount of abdominal/pelvic ascites, splenomegaly, and scattered varicosities. The above findings would be most typical for sequelae of portal venous hypertension.  3. Significant stranding of the omental fat and intraperitoneal fat, this is nonspecific, but may be reactive or neoplastic in nature.  4. Percutaneous gastrostomy tube, tracheostomy, and the rectal tube appear to be in good position.  5. Central catheter with tip in the inferior right atrium.  6. Mild to moderate right pleural effusion and mild left pleural effusion, both of these appear to be loculated given the peripheral enhancement. Presumed sequelae of prior hematoma seen in the right lung.  7. Minute amount of gas seen associated with the left pleural effusion and this is presumed to be iatrogenic in nature.  8. Slight filling defect within the lower trachea most typical for slight mucous.  9. Enlargement of the main pulmonary artery presumed to represent sequelae of pulmonary arterial hypertension.           CTA Neck with Contrast    Narrative    EXAM: CTA NECK WITH CONTRAST  LOCATION: [facility]  DATE/TIME: 1/6/2023 11:55 PM    INDICATION: Tracheostomy in place, worsening bloody tracheal secretions, rule out tracheoinnominate fistula.  COMPARISON: None.  CONTRAST: Isovue 370 100 mL  TECHNIQUE: Neck CT angiogram with IV contrast. Axial helical CT images of the neck vessels were obtained during the arterial phase of intravenous contrast administration. Axial helical 2D reconstructed images and multiplanar 3D MIP reconstructed images   of the neck vessels were performed by the  technologist. Dose reduction techniques were used. All stenosis measurements made according to NASCET criteria unless otherwise specified.    FINDINGS:  RIGHT CAROTID: Mild scattered plaque. No significant stenosis or dissection. Tortuous right ICA.    LEFT CAROTID: Minimal plaque noted along the left common carotid artery. Moderate calcified plaque identified involving the left carotid bifurcation and left proximal ICA which causes less than 50% stenosis. No significant stenosis or dissection   identified. Tortuous ICA.    VERTEBRAL ARTERIES: No focal stenosis or dissection. Dominant left and smaller right vertebral arteries.    AORTIC ARCH: A tracheostomy tube is identified. No definite findings of a tracheoinnominate artery fistula identified. Two-vessel aortic arch anatomy. Minimal narrowing identified involving the origin of the left subclavian artery. No significant   stenosis identified involving the origins of the great vessels.    NONVASCULAR STRUCTURES: Unremarkable.      Impression    IMPRESSION:   1.  No significant stenosis or dissection.  2.  No definite findings to suggest a tracheoinnominate artery fistula.   XR Chest Port 1 View    Narrative    EXAM: XR CHEST PORT 1 VIEW  LOCATION: [facility]  DATE/TIME: 1/10/2023 1:47 PM    INDICATION: Sepsis.  COMPARISON: 01/06/2023 and 01/04/2023.      Impression    IMPRESSION: Tracheostomy tube tip in good position. Right IJ central venous dialysis catheter tips in the upper right atrium. Moderate bibasilar pleural fluid and/or thickening and associated bibasilar segmental atelectasis unchanged. Mild generalized   cardiac enlargement stable. Pulmonary vascularity within normal limits.                  US Thoracentesis    Narrative    EXAM:   1. RIGHT  THORACENTESIS  2. ULTRASOUND GUIDANCE  LOCATION: [facility]  DATE/TIME: 1/13/2023 12:56 PM    INDICATION: Pleural effusion.    PROCEDURE: Informed consent obtained.  Time out performed. The chest was prepped and draped in sterile fashion. 5  mL of 1 % lidocaine was infused into the local soft tissues. Under direct ultrasound guidance, a 5 Martiniquais catheter system was placed into   the pleural effusion.     50 mL of complex right pleural fluid were removed and sent to lab, if requested.    Patient tolerated procedure well.    Ultrasound imaging was obtained and placed in the patient's permanent medical record.      Impression    IMPRESSION:  Status post right  ultrasound-guided thoracentesis. Very little fluid could be obtained because the fluid is complex.    Reference CPT Code: 32795   US Paracentesis without Albumin    Narrative    EXAM:  1. PARACENTESIS  2. ULTRASOUND GUIDANCE  LOCATION: [facility]  DATE/TIME: 1/13/2023 1:09 PM    INDICATION: Ascites.    PROCEDURE: Informed consent obtained. Time out performed. The abdomen was prepped and draped in a sterile fashion. 10 mL of 1% lidocaine was infused into local soft tissues. A 5 Martiniquais catheter system was introduced into the abdominal ascites under   ultrasound guidance.    7.5  liters of milky white  fluid were removed and sent to lab if requested.    Patient tolerated procedure well.    Ultrasound imaging was obtained and placed in the patient's permanent medical record.      Impression    IMPRESSION:  1.  Status post ultrasound-guided paracentesis.    Reference CPT Code: 96349   XR Video Swallow with SLP or OT    Narrative    EXAM: XR VIDEO SWALLOW WITH SLP OR OT  LOCATION: [facility]  DATE/TIME: 1/13/2023 10:26 AM    INDICATION: Difficulty swallowing.  COMPARISON: None.    TECHNIQUE: Routine swallow study with speech pathology using multiple barium thicknesses.    FINDINGS:   FLUOROSCOPIC TIME: 2.6  NUMBER OF IMAGES: 26     Swallow study with Speech Pathology using multiple barium thicknesses.     Adequate oral phase.    Delay in initiation of swallow reflux with pour over  to the vallecular space during swallowing of moderately thick liquid, puree and crunchy solid consistency into the pyriform sinuses during swallowing of thin and mildly thick liquid. Complete   epiglottic inversion was demonstrated.    Deep laryngeal penetration and silent aspiration occurred during swallowing of thin liquid. Deep laryngeal penetration and aspiration occurred during swallowing of mildly thick liquid from an open cup. Shallow laryngeal penetration occurred during   swallowing of mildly thick liquid from a spoon. No laryngeal penetration or aspiration was seen during swallowing of moderately thick liquid, puree or crunchy solid consistencies.    Mild vallecular and pyriform stasis after swallowing of all consistencies.      Impression    IMPRESSION:  1.  Adequate oral phase.  2.  Delay in initiation of swallow reflex with complete epiglottic inversion.  3.  Aspiration of thin and mildly thick liquid.  4.  Mild stasis.      Please refer to speech therapy dysphasia evaluation report for additional information.    Examination performed by Kacey Lopez RPA/ALBERTO, RT (R) under the general supervision of Dr. Fahrner .       XR Chest Port 1 View    Narrative    EXAM: XR CHEST PORT 1 VIEW  LOCATION: [facility]  DATE/TIME: 1/13/2023 5:53 PM    INDICATION: SEPSIS BPA  COMPARISON: 1/10/2023      Impression    IMPRESSION: Tracheostomy tube. Right-sided PICC line with tip over SVC. Right-sided central venous catheter with tip over right atrium. Small bilateral pleural effusions. Pulmonary vascular congestion. No pneumothorax. No acute osseous abnormality.   CT Chest/Abdomen/Pelvis w Contrast    Narrative    EXAM: CT CHEST/ABDOMEN/PELVIS W CONTRAST  LOCATION: [facility]  DATE/TIME: 1/18/2023 1:23 PM    INDICATION: Follow up pleural effusion, ascites  COMPARISON: 01/06/2023  TECHNIQUE: CT scan of the chest, abdomen, and pelvis was performed following injection of IV  contrast. Multiplanar reformats were obtained. Dose reduction techniques were used.   CONTRAST: IsoVue 370 100mL    FINDINGS:   LUNGS AND PLEURA: Small bilateral loculated pleural effusions with thin pleural enhancement, slightly decreased compared to 01/06/2023. Airspace opacities at both lung bases are unchanged and likely compressive atelectasis. Decreased presumed hematoma in   the right lung. Tracheostomy in good position.    MEDIASTINUM/AXILLAE: No lymphadenopathy. No thoracic aortic aneurysms. Right neck and right upper terminate central venous catheters terminate in the right atrium and lower SVC, respectively. No central pulmonary emboli.    CORONARY ARTERY CALCIFICATION: Mild.    HEPATOBILIARY: Liver transplant. No biliary dilation. Uniform parenchymal enhancement.     PANCREAS: Normal.    SPLEEN: Enlarged, 18.3 cm craniocaudal, previously 18.1 cm    ADRENAL GLANDS: Normal.    KIDNEYS/BLADDER: Unchanged nonobstructing calculus in the upper pole right kidney. The bladder is completely collapsed and poorly evaluated. No renal collecting system dilation.    BOWEL: Percutaneous gastrostomy tube with the balloon now in the lumen and no longer closely apposed to the gastric wall. No evidence of acute inflammation or obstruction. Rectal tube in situ.    PERITONEUM: Large volume ascites within the peritoneal enhancement, as well as peritoneal and mesenteric fat stranding.    LYMPH NODES: Normal.    VASCULATURE: Moderate diffuse atherosclerotic calcification of the abdominal aorta and iliofemoral arteries. Extensive collaterals throughout the omentum in the upper abdomen. Portomesenteric veins are patent. Hepatic veins not well opacified probably   due to timing.    PELVIC ORGANS: Normal.    MUSCULOSKELETAL: Tiny fat-containing supraumbilical ventral hernia. Diffuse muscular atrophy. No aggressive or destructive osseous lesions. Degenerative changes in the spine. Unchanged mild compression deformities of T10 and  L1.      Impression    IMPRESSION:  1.  Small bilateral partially loculated pleural effusions with associated pleural enhancement consistent with infected effusions. Size mildly decreased compared to 01/06/2023.    2.  Large volume ascites with thin peritoneal enhancement consistent with bacterial peritonitis. Volume of ascites is similar to the prior CT. Mesenteric and omental fat stranding is nonspecific, but most likely related to underlying infection and portal   hypertension.    3.  Percutaneous gastrostomy tube balloon is now within the lumen and no longer closely apposed to the gastric wall, suggesting interval displacement. Correlate for malfunction.       Discharge Medications    Please see current medication under MAR for complete list    Current Discharge Medication List      CONTINUE these medications which have NOT CHANGED    Details   acetylcysteine (MUCOMYST) 20 % neb solution Take 2 mLs by nebulization 2 times daily  Qty: 100 mL, Refills: 1    Associated Diagnoses: Critical illness myopathy      albuterol (PROVENTIL) (2.5 MG/3ML) 0.083% neb solution Take 1 vial (2.5 mg) by nebulization every 2 hours as needed for shortness of breath  Qty: 90 mL, Refills: 1    Associated Diagnoses: Critical illness myopathy      apixaban ANTICOAGULANT (ELIQUIS) 5 MG tablet 1 tablet (5 mg) by Oral or Feeding Tube route 2 times daily  Qty: 60 tablet, Refills: 1    Associated Diagnoses: Critical illness myopathy      calcium carbonate (TUMS) 500 MG chewable tablet Take 1-2 chew tab by mouth 4 times daily as needed for heartburn      carboxymethylcellulose PF (REFRESH PLUS) 0.5 % ophthalmic solution Place 1 drop into both eyes 3 times daily  Qty: 50 each, Refills: 1    Associated Diagnoses: Critical illness myopathy      famotidine (PEPCID) 20 MG tablet Take 20 mg by mouth 2 times daily as needed (heartburn)      folic acid 5 MG/ML injection Inject 0.2 mLs (1 mg) into the vein daily  Qty: 50 mL, Refills: 1    Associated  Diagnoses: Critical illness myopathy      glucose 40 % (400 mg/mL) gel Take 15-30 g by mouth every 15 minutes as needed for low blood sugar  Qty: 100 mL, Refills: 1    Associated Diagnoses: Critical illness myopathy      hydrocortisone sodium succinate PF (SOLU-CORTEF) 100 MG injection Inject 0.5 mLs (25 mg) into the vein every 6 hours  Qty: 100 mL, Refills: 1    Associated Diagnoses: Critical illness myopathy      insulin glargine (LANTUS PEN) 100 UNIT/ML pen Inject 30 Units Subcutaneous every morning (before breakfast)  Qty: 15 mL, Refills: 1    Comments: If Lantus is not covered by insurance, may substitute Basaglar or Semglee or other insulin glargine product per insurance preference at same dose and frequency.    Associated Diagnoses: Critical illness myopathy      ipratropium - albuterol 0.5 mg/2.5 mg/3 mL (DUONEB) 0.5-2.5 (3) MG/3ML neb solution Take 1 vial (3 mLs) by nebulization 4 times daily  Qty: 90 mL, Refills: 1    Associated Diagnoses: Critical illness myopathy      lactulose (CHRONULAC) 10 GM/15ML solution 30 mLs (20 g) by Oral or Feeding Tube route 3 times daily  Qty: 100 mL, Refills: 1    Associated Diagnoses: Critical illness myopathy      midodrine (PROAMATINE) 10 MG tablet 1 tablet (10 mg) by Oral or Feeding Tube route 3 times daily (with meals)  Qty: 100 tablet, Refills: 1    Associated Diagnoses: Critical illness myopathy      Multiple Vitamins-Minerals (MULTIVITAMINS W/MINERALS) liquid 15 mLs by Per Feeding Tube route daily  Qty: 100 mL, Refills: 1    Associated Diagnoses: Critical illness myopathy      nystatin (MYCOSTATIN) 338240 UNIT/ML suspension Swish and swallow 5 mLs (500,000 Units) in mouth 4 times daily  Qty: 100 mL, Refills: 1    Associated Diagnoses: Critical illness myopathy      ondansetron (ZOFRAN ODT) 4 MG ODT tab Take 1 tablet (4 mg) by mouth every 6 hours as needed for nausea or vomiting  Qty: 30 tablet, Refills: 1    Associated Diagnoses: Critical illness myopathy      !!  order for DME Equipment being ordered: Compression knee high stockings for B LE lymphedema 20-30mmHg  Qty: 1 each, Refills: 0    Associated Diagnoses: Lymphedema of both lower extremities      !! order for DME Equipment being ordered: Class 2 (30-40mmHg) compression knee high stockings, night time knee high compression alternative, bandaging supplies  Qty: 1 each, Refills: 0    Associated Diagnoses: Lymphedema of both lower extremities      oxyCODONE (ROXICODONE) 5 MG tablet 1 tablet (5 mg) by Per Feeding Tube route every 4 hours as needed for moderate pain (4-6)  Qty: 60 tablet, Refills: 0    Associated Diagnoses: Critical illness myopathy      pantoprazole (PROTONIX) 2 mg/mL SUSP suspension 20 mLs (40 mg) by Per Feeding Tube route every morning (before breakfast)  Qty: 100 mL, Refills: 1    Associated Diagnoses: Critical illness myopathy      rifaximin (XIFAXAN) 550 MG TABS tablet 1 tablet (550 mg) by Per Feeding Tube route 2 times daily  Qty: 60 tablet, Refills: 1    Associated Diagnoses: Critical illness myopathy      tacrolimus (GENERIC EQUIVALENT) 1 mg/mL suspension 1 mL (1 mg) by Oral or Feeding Tube route 2 times daily  Qty: 100 mL, Refills: 4    Associated Diagnoses: Critical illness myopathy       !! - Potential duplicate medications found. Please discuss with provider.        Allergies   No Known Allergies

## 2023-01-21 NOTE — PLAN OF CARE
Problem: Mechanical Ventilation Invasive  Goal: Optimal Device Function  Outcome: Progressing  Intervention: Optimize Device Care and Function  Recent Flowsheet Documentation  Taken 1/7/2023 1631 by Wilner Beyer, RT  Airway Safety Measures: all equipment/monitors on and audible  Taken 1/7/2023 1500 by Wilner Beyer, RT  Airway Safety Measures: all equipment/monitors on and audible  Taken 1/7/2023 1344 by Wilner Beyer, RT  Airway Safety Measures: all equipment/monitors on and audible  Taken 1/7/2023 1138 by Wilner Beyer, RT  Airway Safety Measures: all equipment/monitors on and audible  Taken 1/7/2023 0805 by Wilner Beyer, RT  Airway Safety Measures: all equipment/monitors on and audible  Taken 1/7/2023 0738 by Wilner Beyer, RT  Airway Safety Measures: all equipment/monitors on and audible  Goal: Mechanical Ventilation Liberation  Outcome: Progressing   RT PROGRESS NOTE     DATA:     CURRENT SETTINGS:ac/12/450/+5, 25%, SB             TRACH TYPE / SIZE:  # 7 bivona 01/9             MODE:   CAP/RA             FIO2:   21%     ACTION:             THERAPIES:   ALB/MUCCO bid             SUCTION:                           FREQUENCY:   X1                        AMOUNT:  /mod                        CONSISTENCY:   thick                        COLOR:   white             SPONTANEOUS COUGH EFFORT/STRENGTH OF EFFORT (not elicited by suctioning):                               WEANING PHASE:   3                        WEAN MODE:    cap/RA                        WEAN TIME:  as ashley                        END WEAN REASON:   cont      RESPONSE:             BS:   crs             VITAL SIGNS:   %, RR 20-22             EMOTIONAL NEEDS / CONCERNS:               RISK FOR SELF DECANNULATION:                         RISK DUE TO:                         INTERVENTION/S IN PLACE IS/ARE:       NOTE / PLAN:   Goal Outcome Evaluation:         Capped on room air  , tolerating well,   Plan-  [facility] for [facility]acentesis and para centesis

## 2023-01-22 ENCOUNTER — APPOINTMENT (OUTPATIENT)
Dept: CT IMAGING | Facility: CLINIC | Age: 58
DRG: 981 | End: 2023-01-22
Attending: THORACIC SURGERY (CARDIOTHORACIC VASCULAR SURGERY)
Payer: COMMERCIAL

## 2023-01-22 LAB
ALBUMIN SERPL BCG-MCNC: 2.1 G/DL (ref 3.5–5.2)
ALP SERPL-CCNC: 260 U/L (ref 40–129)
ALT SERPL W P-5'-P-CCNC: 9 U/L (ref 10–50)
ANION GAP SERPL CALCULATED.3IONS-SCNC: 13 MMOL/L (ref 7–15)
ANION GAP SERPL CALCULATED.3IONS-SCNC: 13 MMOL/L (ref 7–15)
AST SERPL W P-5'-P-CCNC: 24 U/L (ref 10–50)
BILIRUB SERPL-MCNC: 0.5 MG/DL
BUN SERPL-MCNC: 33.6 MG/DL (ref 6–20)
BUN SERPL-MCNC: 35 MG/DL (ref 6–20)
CALCIUM SERPL-MCNC: 9.1 MG/DL (ref 8.6–10)
CALCIUM SERPL-MCNC: 9.2 MG/DL (ref 8.6–10)
CHLORIDE SERPL-SCNC: 94 MMOL/L (ref 98–107)
CHLORIDE SERPL-SCNC: 96 MMOL/L (ref 98–107)
CREAT SERPL-MCNC: 2.68 MG/DL (ref 0.67–1.17)
CREAT SERPL-MCNC: 2.92 MG/DL (ref 0.67–1.17)
CRP SERPL-MCNC: 138.42 MG/L
DEPRECATED HCO3 PLAS-SCNC: 26 MMOL/L (ref 22–29)
DEPRECATED HCO3 PLAS-SCNC: 29 MMOL/L (ref 22–29)
ERYTHROCYTE [DISTWIDTH] IN BLOOD BY AUTOMATED COUNT: 18.8 % (ref 10–15)
GFR SERPL CREATININE-BSD FRML MDRD: 24 ML/MIN/1.73M2
GFR SERPL CREATININE-BSD FRML MDRD: 27 ML/MIN/1.73M2
GLUCOSE BLDC GLUCOMTR-MCNC: 102 MG/DL (ref 70–99)
GLUCOSE BLDC GLUCOMTR-MCNC: 104 MG/DL (ref 70–99)
GLUCOSE BLDC GLUCOMTR-MCNC: 124 MG/DL (ref 70–99)
GLUCOSE SERPL-MCNC: 119 MG/DL (ref 70–99)
GLUCOSE SERPL-MCNC: 90 MG/DL (ref 70–99)
HCT VFR BLD AUTO: 28.6 % (ref 40–53)
HGB BLD-MCNC: 8.4 G/DL (ref 13.3–17.7)
MAGNESIUM SERPL-MCNC: 2 MG/DL (ref 1.7–2.3)
MCH RBC QN AUTO: 29.2 PG (ref 26.5–33)
MCHC RBC AUTO-ENTMCNC: 29.4 G/DL (ref 31.5–36.5)
MCV RBC AUTO: 99 FL (ref 78–100)
PHOSPHATE SERPL-MCNC: 4.2 MG/DL (ref 2.5–4.5)
PLATELET # BLD AUTO: 231 10E3/UL (ref 150–450)
POTASSIUM SERPL-SCNC: 3.8 MMOL/L (ref 3.4–5.3)
POTASSIUM SERPL-SCNC: 4 MMOL/L (ref 3.4–5.3)
PROCALCITONIN SERPL IA-MCNC: 1.78 NG/ML
PROT SERPL-MCNC: 6.4 G/DL (ref 6.4–8.3)
RBC # BLD AUTO: 2.88 10E6/UL (ref 4.4–5.9)
SODIUM SERPL-SCNC: 135 MMOL/L (ref 136–145)
SODIUM SERPL-SCNC: 136 MMOL/L (ref 136–145)
WBC # BLD AUTO: 7 10E3/UL (ref 4–11)

## 2023-01-22 PROCEDURE — 120N000013 HC R&B IMCU

## 2023-01-22 PROCEDURE — 250N000013 HC RX MED GY IP 250 OP 250 PS 637: Performed by: STUDENT IN AN ORGANIZED HEALTH CARE EDUCATION/TRAINING PROGRAM

## 2023-01-22 PROCEDURE — 120N000001 HC R&B MED SURG/OB

## 2023-01-22 PROCEDURE — 80053 COMPREHEN METABOLIC PANEL: CPT | Performed by: STUDENT IN AN ORGANIZED HEALTH CARE EDUCATION/TRAINING PROGRAM

## 2023-01-22 PROCEDURE — 99222 1ST HOSP IP/OBS MODERATE 55: CPT | Performed by: INTERNAL MEDICINE

## 2023-01-22 PROCEDURE — 84100 ASSAY OF PHOSPHORUS: CPT | Performed by: STUDENT IN AN ORGANIZED HEALTH CARE EDUCATION/TRAINING PROGRAM

## 2023-01-22 PROCEDURE — 99233 SBSQ HOSP IP/OBS HIGH 50: CPT | Performed by: STUDENT IN AN ORGANIZED HEALTH CARE EDUCATION/TRAINING PROGRAM

## 2023-01-22 PROCEDURE — 250N000009 HC RX 250: Performed by: STUDENT IN AN ORGANIZED HEALTH CARE EDUCATION/TRAINING PROGRAM

## 2023-01-22 PROCEDURE — 250N000013 HC RX MED GY IP 250 OP 250 PS 637: Performed by: FAMILY MEDICINE

## 2023-01-22 PROCEDURE — 71250 CT THORAX DX C-: CPT

## 2023-01-22 PROCEDURE — 86140 C-REACTIVE PROTEIN: CPT | Performed by: STUDENT IN AN ORGANIZED HEALTH CARE EDUCATION/TRAINING PROGRAM

## 2023-01-22 PROCEDURE — 83735 ASSAY OF MAGNESIUM: CPT | Performed by: STUDENT IN AN ORGANIZED HEALTH CARE EDUCATION/TRAINING PROGRAM

## 2023-01-22 PROCEDURE — 85014 HEMATOCRIT: CPT | Performed by: STUDENT IN AN ORGANIZED HEALTH CARE EDUCATION/TRAINING PROGRAM

## 2023-01-22 PROCEDURE — 250N000012 HC RX MED GY IP 250 OP 636 PS 637: Performed by: STUDENT IN AN ORGANIZED HEALTH CARE EDUCATION/TRAINING PROGRAM

## 2023-01-22 PROCEDURE — 82310 ASSAY OF CALCIUM: CPT | Performed by: STUDENT IN AN ORGANIZED HEALTH CARE EDUCATION/TRAINING PROGRAM

## 2023-01-22 PROCEDURE — 84145 PROCALCITONIN (PCT): CPT | Performed by: STUDENT IN AN ORGANIZED HEALTH CARE EDUCATION/TRAINING PROGRAM

## 2023-01-22 RX ORDER — LACOSAMIDE 10 MG/ML
100 SOLUTION ORAL 2 TIMES DAILY
Status: ON HOLD | COMMUNITY
End: 2023-04-28

## 2023-01-22 RX ORDER — LEVETIRACETAM 100 MG/ML
1000 SOLUTION ORAL EVERY 24 HOURS
Status: ON HOLD | COMMUNITY
End: 2023-04-28

## 2023-01-22 RX ADMIN — MIDODRINE HYDROCHLORIDE 5 MG: 5 TABLET ORAL at 12:30

## 2023-01-22 RX ADMIN — ACETAMINOPHEN 650 MG: 325 TABLET, FILM COATED ORAL at 17:57

## 2023-01-22 RX ADMIN — FLUCONAZOLE 200 MG: 40 POWDER, FOR SUSPENSION ORAL at 21:02

## 2023-01-22 RX ADMIN — FLUCONAZOLE 200 MG: 40 POWDER, FOR SUSPENSION ORAL at 01:58

## 2023-01-22 RX ADMIN — LACTULOSE 20 G: 10 SOLUTION ORAL at 08:50

## 2023-01-22 RX ADMIN — LACTULOSE 20 G: 10 SOLUTION ORAL at 02:00

## 2023-01-22 RX ADMIN — Medication 5 ML: at 08:50

## 2023-01-22 RX ADMIN — NYSTATIN 500000 UNITS: 100000 SUSPENSION ORAL at 08:50

## 2023-01-22 RX ADMIN — LACOSAMIDE 100 MG: 10 SOLUTION ORAL at 20:58

## 2023-01-22 RX ADMIN — RIFAXIMIN 550 MG: 550 TABLET ORAL at 20:58

## 2023-01-22 RX ADMIN — LACTULOSE 20 G: 10 SOLUTION ORAL at 20:58

## 2023-01-22 RX ADMIN — Medication 40 MG: at 17:23

## 2023-01-22 RX ADMIN — MIDODRINE HYDROCHLORIDE 5 MG: 5 TABLET ORAL at 08:50

## 2023-01-22 RX ADMIN — LEVETIRACETAM 1000 MG: 100 SOLUTION ORAL at 21:43

## 2023-01-22 RX ADMIN — NYSTATIN 500000 UNITS: 100000 SUSPENSION ORAL at 12:30

## 2023-01-22 RX ADMIN — TACROLIMUS 1 MG: 5 CAPSULE ORAL at 18:04

## 2023-01-22 RX ADMIN — RIFAXIMIN 550 MG: 550 TABLET ORAL at 08:50

## 2023-01-22 RX ADMIN — LEVETIRACETAM 1000 MG: 100 SOLUTION ORAL at 01:57

## 2023-01-22 RX ADMIN — Medication 40 MG: at 08:50

## 2023-01-22 RX ADMIN — FOLIC ACID 1 MG: 1 TABLET ORAL at 08:50

## 2023-01-22 RX ADMIN — TACROLIMUS 1 MG: 5 CAPSULE ORAL at 08:50

## 2023-01-22 RX ADMIN — ZOLPIDEM TARTRATE 2.5 MG: 5 TABLET, FILM COATED ORAL at 21:41

## 2023-01-22 RX ADMIN — WHITE PETROLATUM: 1.75 OINTMENT TOPICAL at 09:14

## 2023-01-22 RX ADMIN — LACOSAMIDE 100 MG: 10 SOLUTION ORAL at 08:56

## 2023-01-22 RX ADMIN — MIDODRINE HYDROCHLORIDE 5 MG: 5 TABLET ORAL at 17:23

## 2023-01-22 RX ADMIN — NYSTATIN 500000 UNITS: 100000 SUSPENSION ORAL at 21:02

## 2023-01-22 ASSESSMENT — ACTIVITIES OF DAILY LIVING (ADL)
WALKING_OR_CLIMBING_STAIRS: AMBULATION DIFFICULTY, DEPENDENT;STAIR CLIMBING DIFFICULTY, DEPENDENT;TRANSFERRING DIFFICULTY, DEPENDENT
DRESSING/BATHING_MANAGEMENT: ASSISTANCE
EATING/SWALLOWING: SWALLOWING LIQUIDS;SWALLOWING SOLID FOOD
DRESSING/BATHING: BATHING DIFFICULTY, DEPENDENT;DRESSING DIFFICULTY, ASSISTANCE 1 PERSON
CONCENTRATING,_REMEMBERING_OR_MAKING_DECISIONS_DIFFICULTY: YES
ADLS_ACUITY_SCORE: 52
ADLS_ACUITY_SCORE: 60
ADLS_ACUITY_SCORE: 48
FALL_HISTORY_WITHIN_LAST_SIX_MONTHS: NO
ADLS_ACUITY_SCORE: 48
ADLS_ACUITY_SCORE: 52
ADLS_ACUITY_SCORE: 48
TOILETING_ASSISTANCE: TOILETING DIFFICULTY, DEPENDENT
TOILETING_ISSUES: YES
ADLS_ACUITY_SCORE: 48
DIFFICULTY_EATING/SWALLOWING: YES
ADLS_ACUITY_SCORE: 52
DRESSING/BATHING_DIFFICULTY: YES
WEAR_GLASSES_OR_BLIND: NO
WALKING_OR_CLIMBING_STAIRS_DIFFICULTY: YES
ADLS_ACUITY_SCORE: 52
CHANGE_IN_FUNCTIONAL_STATUS_SINCE_ONSET_OF_CURRENT_ILLNESS/INJURY: NO
ADLS_ACUITY_SCORE: 60
EQUIPMENT_CURRENTLY_USED_AT_HOME: OTHER (SEE COMMENTS)
ADLS_ACUITY_SCORE: 48
DOING_ERRANDS_INDEPENDENTLY_DIFFICULTY: YES
ADLS_ACUITY_SCORE: 60

## 2023-01-22 NOTE — PROGRESS NOTES
[facility]    Medicine Progress Note - Hospitalist Service    Date of Admission:  1/21/2023    Assessment & Plan   Blanco Osborne is a 57 year old male admitted on 1/21/2023 as a transfer from [facility] for evaluation of loculated pleural effusion. Patient has multiple medical problems including history of liver transplant 2016 due to alcohol abuse, pAF on chronic anticoagulation, history of DM2, CVA, HTN, CHRISTIAN on BiPAP. 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 Discharge to [facility], back to [facility] and back to [facility] during month of December.    Recently while at [facility] CT chest/abdomen/pelvis on 01/18 demonstrated concerns for infected parapneumonic effusions and SBP.  CT findings discussed with Pulmonology team with 3 different pulmonologist including (Dr. Monge, Dr. Chu and Dr. Jay) with agreement that patient needs chest tube and possible lytics and therefore transfer to higher level of care.     See discharge summary 01/21/2023 for more details of his recent hx.     Bilateral pleural effusions, ? empyema  Acute now chronic respiratory failure requiring tracheostomy  - resolved    Pneumonia  - resolved   ARDS  - resolved    Right pneumothorax - resolved   Initial event was parainfluenza and strep pneumo pneumonia back in November 2022. Treated for ARDS. Had failed extubation x2 and tracheostomy performed last hospitalization.  Had additional complications of bilateral pneumothoraces as well as hydropneumothorax- pleural fluid grew candida parapsilosis. He was treated with IV micafungin and currently on fluconazole, to finish 4-week course. Chest tube was placed and subsequently removed. While in [facility] he was successfully weaned from the ventilator. His respiratory status is now stable on room air.   * Most  recently, there has been concern for right complicated parapneumonic effusion. S/p thoracentesis 11/26 and 1/13.  Right thoracentesis 1/13 consistent with exudative effusion with elevated LDH, high neutrophils, cultures show NGTD. CT on 1/18 showed small bilateral partially loculated pleural effusions with associated pleural enhancement consistent with infected effusions. Pulmonary consultants at [facility] have recommended transfer to [facility] for consideration of chest tube placement and possible intra-pleural lytics. Dr. Howe with thoracic surgery consulted prior to transfer.     - thoracic surgery evaluated today and planning for CT. Further recommendations forthcoming.  - Continue mucomyst, albuterol and duonebs   - Tracheostomy care per RT  - Continue fluconazole, to finish 4-week course.  - ID consult placed to guide antibiotic selection, unclear if any true infection despite [facility]. No antibiotics for now     S/p Liver transplant 2016  Cirrhosis with ascites  Subacute bacterial peritonitis  Main manifestations of this are hepatic encephalopathy and ascites.  Hepatologist at [place] felt that most likely reason for the cirrhosis was metabolic syndrome or alcohol intake.  There was no evidence of rejection on biopsy and there was some evidence of regeneration. Paracentesis done on 12/22/2022 showed lactobacillus indicating SBP, treated with Unasyn and Augmentin, finished 2-week course 1/12/2023.  Requires large-volume paracentesis periodically for recurring ascites.    * Paracentesis 1/13/2023 yielded about 7500 cc. Cultures NGTD.    * Repeat CT abdomen/pelvis on 1/18 concerning for SBP given peritoneal enhancement.  * Repeat labs 1/19:  from 22, procalcitonin 1.64 from 1.81.  Ammonia 32.      - US guided paracentesis today vs tomorrow. Pending plan on thoracentesis. Clinical patient is stable without signs of SBP  - Antibiotics not initiated -  preferably obtain pleural and paracentesis cultures before starting antibiotics.    - Continue intermittent paracentesis as needed if develops symptomatic ascites.    - Further treatment for recurrent ascites with TIPS deferred to his Liver Transplant team and/or Hepatology  - He has an appointment on 4/21/2023 with Hepatology, Dr. Simms  - Continue Tacrolimus 1 mg BID  - Continue Lactulose and Rifaximin      Paroxysmal atrial fibrillation  Remains in sinus rhythm.  On anticoagulation with apixaban indefinitely for stroke prophylaxis.    - Apixaban on hold for possible chest tube placement paracentesis in AM.  - Cardiac monitoring       ESRD: Now on iHD.  No return of renal function.  - MWF schedule   - Nephrology consulted      Acute anemia  Pt has required intermittent transfusions for on-going anemia.   -Anemia most likely secondary to critical illness/chronic disease, chronic renal disease  -Transfuse packed red blood cells to keep Hb> 7  -Monitor H&H.     History PEA arrest   PEA arrest prior to his previous admission and 1 episode of PEA associated with desaturation on 12/20.  No structural cardiac disease.   - Continue Cardiac Monitoring     Hypotension  Also has developed baseline hypotension 2/2 cirrhosis and prolonged critical illness.   -Continue current midodrine dose       Seizure after hypoxic event.  This is in the context of baseline multifactorial encephalopathy secondary to his ongoing critical illness and prior cardiac arrests and prior strokes and cirrhosis with hepatic encephalopathy. MRI of head of 12/20/22 reveals no PRES or leptomeningeal enhancement but scattered.  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   - Continue with  Keppra and Vimpat       Diabetes mellitus type 2  -Lantus decreased to 5 units daily on 1/19, hold this for now  -MDSSI  -Hypoglycemic protocol in place     Moderate malnutrition, protein and calorie type.  -Continue  with tube feeds via PEG tube  -Nutritionist consulted and following          Diet: Adult Formula Bolus Feeding: Novasource Renal; Route: Gastrostomy; 3 times daily; Volume per Bolus: 250; mL(s); Additional free water (mL): 100 ml b/4 and after each bolus feeding if given; 125 ml/h x 2 h back up bolus feeding after each meal ONLY...  Soft & Bite Sized Diet (level 6) Liquidized/Moderately Thick (level 3)  Snacks/Supplements Adult: Gelatein Plus; With Meals  Snacks/Supplements Adult: Ensure Enlive; Between Meals    DVT Prophylaxis: DOAC  Nixon Catheter: Not present  Lines: PRESENT      PICC 11/24/22 Triple Lumen Right Brachial vein medial Access-Site Assessment: WDL  CVC Double Lumen Right External jugular Tunneled-Site Assessment: WDL      Cardiac Monitoring: ACTIVE order. Indication: Tachyarrhythmias, acute (48 hours)  Code Status: Full Code      Clinically Significant Risk Factors Present on Admission           # Hypercalcemia: corrected calcium is >10.1, will monitor as appropriate    # Hypoalbuminemia: Lowest albumin = 2.1 g/dL at 1/22/2023  5:28 AM, will monitor as appropriate  # Drug Induced Coagulation Defect: home medication list includes an anticoagulant medication                 Disposition Plan      Expected Discharge Date: 01/23/2023                  Meredith Waldrop DO  Hospitalist Service  [facility]  Securely message with [employer] (more info)  Text page via [facility] Paging/Directory   ______________________________________________________________________    Interval History   Patient evaluated at bedside with wife and nursing staff. Patient is drowsy but comfortable. Breathing RA. He denies many complaints. Abdomen is soft and non distended. Not painful. Appears jaundiced and ill but comfortable. Updated nursing and family on plan for the day starting with waiting for thoracic surgery. I reviewed patient past hospitalization at [facility]. His visits here. His recent imaging. I spent  time talking at bedside with family.     Physical Exam   Vital Signs: Temp: 98  F (36.7  C) Temp src: Oral BP: 107/76 Pulse: 101   Resp: 20 SpO2: 95 % O2 Device: None (Room air)    Weight: 0 lbs 0 oz    Constitutional: Awake, alert, cooperative, no apparent distress, ill cachetic, jaundiced  HEENT: Trach in place but capped  Respiratory: Clear to auscultation bilaterally, trace crackles in bases  Cardiovascular:  No obvious edema no murmur  GI: Normal bowel sounds, soft, non-tender  Skin/Integumen: No rashes, no cyanosis,    Medical Decision Making       65 MINUTES SPENT BY ME on the date of service doing chart review, history, exam, documentation & further activities per the note.      Data     I have personally reviewed the following data over the past 24 hrs:    7.0  \   8.4 (L)   / 231     136 94 (L) 33.6 (H) /  90   3.8 29 2.68 (H) \       ALT: 9 (L) AST: 24 AP: 260 (H) TBILI: 0.5   ALB: 2.1 (L) TOT PROTEIN: 6.4 LIPASE: N/A       Procal: 1.78 (H) CRP: 138.42 (H) Lactic Acid: N/A         Imaging results reviewed over the past 24 hrs:   No results found for this or any previous visit (from the past 24 hour(s)).

## 2023-01-22 NOTE — H&P
"[facility]    History and Physical - Hospitalist Service       Date of Admission:  1/21/2023    Assessment & Plan      Blanco Osborne is a 57 year old male admitted on 1/21/2023 as a transfer from [facility] for evaluation of loculated pleural effusion.     The patient has a very complex recent history. Brief hx from [facility] discharge summary:   \"Patient has multiple medical problems including history of liver transplant 2016, PAF on chronic anticoagulation, history of DM2, CVA, HTN, CHRISTIAN on BiPAP, and history of alcohol abuse in the past causing liver damage ending with liver transplant.  in mid November he had respiratory arrest and was diagnosed with infection with parainfluenza and strep pneumoniae and acute on chronic renal insufficiency ending with CRF needing 3/week hemodialysis.  During this period he was diagnosed with cirrhosis of transplanted liver and hyperammonia.  Currently he is on Lactulose and Rifaximin.      On 12/14/22, due to CIP (critical illness polyneuropathy), he was transferred to [facility] for the first time with Trach, PEG and Rt chest tube.    On 12/16/2022 patient was transferred back to [facility] due to respiratory insufficiency secondary to hydropneumothorax and drainage of 900 cc bloody pleural effusion, bloody stool and transfusion of 2 units PRBC.    On 12/20/2022 he had another episode of PEA arrest followed by CPR x2 minutes and possibly had seizure activity in [facility]. 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 His respiratory status is stable on RA. He remains on dialysis MWF. He is being transferred to [facility] for management of complicated pleural effusion.      Bilateral pleural effusions  Acute now chronic respiratory failure " requiring tracheostomy  - resolved    Pneumonia  - resolved   ARDS  - resolved    Right pneumothorax - resolved   Initial event was parainfluenza and strep pneumo pneumonia back in November 2022. Treated for ARDS. Had failed extubation x2 and tracheostomy performed last hospitalization.  Had additional complications of bilateral pneumothoraces as well as hydropneumothorax- pleural fluid grew candida parapsilosis. He was treated with IV micafungin and currently on fluconazole, to finish 4-week course. Chest tube was placed and subsequently removed. [facility] weaned from the ventilator. His respiratory status is now stable on room air.   * Most recently, there has been concern for right complicated parapneumonic effusion. S/p thoracentesis 11/26 and 1/13.  Right thoracentesis 1/13 consistent with exudative effusion with elevated LDH, high neutrophils, cultures show NGTD. CT on 1/18 showed small bilateral partially loculated pleural effusions with associated pleural enhancement consistent with infected effusions. Pulmonary consultants at [facility] have recommended transfer to [facility] for consideration of chest tube placement and possible intra-pleural lytics. Dr. Howe with thoracic surgery consulted prior to transfer.   - Admit to [facility]  - Continuous oximetry   - Continue mucomyst, albuterol and duonebs   - Tracheostomy care per RT  - Continue fluconazole, to finish 4-week course.  - He is not currently on antibiotics - consider ID consult   - Thoracic surgery consult     S/p Liver transplant 2016  Cirrhosis with ascites  Subacute bacterial peritonitis  Main manifestations of this are hepatic encephalopathy and ascites.  Hepatologist at [place] felt that most likely reason for the cirrhosis was metabolic syndrome or alcohol intake.  There was no evidence of rejection on biopsy and there was some evidence of regeneration. Paracentesis done on 12/22/2022 showed lactobacillus indicating SBP,  treated with Unasyn and Augmentin, finished 2-week course 1/12/2023.  Requires large-volume paracentesis periodically for recurring ascites.    * Paracentesis 1/13/2023 yielded about 7500 cc. Cultures NGTD.    * Repeat CT abdomen/pelvis on 1/18 concerning for SBP given peritoneal enhancement.  * Repeat labs 1/19:  from 22, procalcitonin 1.64 from 1.81.  Ammonia 32.    - US guided paracentesis ordered for tomorrow   - Antibiotics not initiated - preferably obtain pleural and paracentesis cultures before starting antibiotics.    - Continue intermittent paracentesis as needed if develops symptomatic ascites.    - Further treatment for recurrent ascites with TIPS deferred to his Liver Transplant team and/or Hepatology  - He has an appointment on 4/21/2023 with Hepatology, Dr. Simms  - Continue Tacrolimus 1 mg BID  - Continue Lactulose and Rifaximin     Paroxysmal atrial fibrillation  Remains in sinus rhythm.  On anticoagulation with apixaban indefinitely for stroke prophylaxis.    -Apixaban on hold for possible chest tube placement paracentesis in AM.  - Cardiac monitoring       ESRD: Now on iHD.  No return of renal function.  - MWF schedule   - Nephrology consulted     Acute anemia  Pt has required intermittent transfusions for on-going anemia.   -Anemia most likely secondary to critical illness/chronic disease, chronic renal disease  -Transfuse packed red blood cells to keep Hb> 7  -Monitor H&H.    History PEA arrest   PEA arrest prior to his previous admission and 1 episode of PEA associated with desaturation on 12/20.  No structural cardiac disease.   - Continue Cardiac Monitoring    Hypotension  Also has developed baseline hypotension 2/2 cirrhosis and prolonged critical illness.   -Continue current midodrine dose       Seizure after hypoxic event.  This is in the context of baseline multifactorial encephalopathy secondary to his ongoing critical illness and prior cardiac arrests and prior strokes and  cirrhosis with hepatic encephalopathy. MRI of head of 12/20/22 reveals no PRES or leptomeningeal enhancement but scattered.  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   - Continue with  Keppra and Vimpat       Diabetes mellitus type 2  -Lantus decreased to 5 units daily on 1/19  -MDSSI  -Hypoglycemic protocol in place     Moderate malnutrition, protein and calorie type.  -Continue with tube feeds via PEG tube  -Nutritionist consulted and following    Diet: Adult Formula Bolus Feeding: Novasource Renal; Route: Gastrostomy; 3 times daily; Volume per Bolus: 250; mL(s); Additional free water (mL): 100 ml b/4 and after each bolus feeding if given; 125 ml/h x 2 h back up bolus feeding after each meal ONLY...  Soft & Bite Sized Diet (level 6) Liquidized/Moderately Thick (level 3)  Snacks/Supplements Adult: Gelatein Plus; With Meals  Snacks/Supplements Adult: Ensure Enlive; Between Meals  DVT Prophylaxis: DOAC  Nixon Catheter: Not present  Lines: PRESENT             Cardiac Monitoring: ACTIVE order. Indication: ICU  Code Status: Full Code      Lavonne Pal MD  Hospitalist Service  [facility]  Securely message with edenes (more info)  Text page via [place] Paging/Directory     ______________________________________________________________________    Chief Complaint       History is obtained from the patient    History of Present Illness   Blanco Osborne is a 57 year old male who presents as a direct transfer for evaluation of loculated pleural effusions. He is feeling ok currently.He denies any current shortness of breath. He reports that he has pain essentially everywhere. He does have increased abdominal pain and distension. He has had difficulty sleeping and poor appetite.       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     NAFLD (nonalcoholic fatty liver disease) 2016     CHRISTIAN on CPAP      Parainfluenza type 1 infection 2022     SBP (spontaneous bacterial peritonitis) (H)     [facility]     Sepsis due to Streptococcus pneumoniae with acute hypoxic respiratory failure (H) 2022     Tubular adenoma 10/2019    Large cecal adenoma- due for surveillance colonoscopy in 3 years (10/2022)       Past Surgical History   Past Surgical History:   Procedure Laterality Date     APPENDECTOMY       BENCH LIVER N/A 2016    Procedure: BENCH LIVER;  Surgeon: Enoc Crews MD;  Location: [facility] OR     BRONCHOSCOPY FLEXIBLE AND RIGID N/A 2022    Procedure: BRONCHOSCOPY;  Surgeon: Alena Valenzuela MD;  Location:  GI     COLONOSCOPY  13    repeat in 2018     COLONOSCOPY N/A 10/4/2019    Procedure: COLONOSCOPY, WITH POLYPECTOMY AND BIOPSY;  Surgeon: Go Chong MD;  Location: [facility] OR     HERNIA REPAIR       IR CHEST TUBE PLACEMENT NON-TUNNELED RIGHT  2022     IR CVC TUNNEL PLACEMENT > 5 YRS OF AGE  2022     IR GASTROSTOMY TUBE PERCUTANEOUS PLCMNT  2022     IR PARACENTESIS  2022     IR PARACENTESIS  2022     IR THORACENTESIS  2022     IR TRANSCATHETER BIOPSY  2022     TRACHEOSTOMY N/A 2022    Procedure: TRACHEOSTOMY;  Surgeon: Nilesh Jackson MD;  Location:  OR     TRANSPLANT LIVER RECIPIENT  DONOR N/A 2016    Procedure: TRANSPLANT LIVER RECIPIENT  DONOR;  Surgeon: Enoc Crews MD;  Location: [facility] OR       Prior to Admission Medications   Prior to Admission Medications   Prescriptions Last Dose Informant Patient Reported? Taking?   Multiple Vitamins-Minerals (MULTIVITAMINS W/MINERALS) liquid   No No   Sig: 15 mLs by Per Feeding Tube route daily   acetylcysteine (MUCOMYST)  20 % neb solution   No No   Sig: Take 2 mLs by nebulization 2 times daily   albuterol (PROVENTIL) (2.5 MG/3ML) 0.083% neb solution   No No   Sig: Take 1 vial (2.5 mg) by nebulization every 2 hours as needed for shortness of breath   apixaban ANTICOAGULANT (ELIQUIS) 5 MG tablet   No No   Si tablet (5 mg) by Oral or Feeding Tube route 2 times daily   calcium carbonate (TUMS) 500 MG chewable tablet   Yes No   Sig: Take 1-2 chew tab by mouth 4 times daily as needed for heartburn   carboxymethylcellulose PF (REFRESH PLUS) 0.5 % ophthalmic solution   No No   Sig: Place 1 drop into both eyes 3 times daily   famotidine (PEPCID) 20 MG tablet   Yes No   Sig: Take 20 mg by mouth 2 times daily as needed (heartburn)   folic acid 5 MG/ML injection   No No   Sig: Inject 0.2 mLs (1 mg) into the vein daily   glucose 40 % (400 mg/mL) gel   No No   Sig: Take 15-30 g by mouth every 15 minutes as needed for low blood sugar   hydrocortisone sodium succinate PF (SOLU-CORTEF) 100 MG injection   No No   Sig: Inject 0.5 mLs (25 mg) into the vein every 6 hours   insulin glargine (LANTUS PEN) 100 UNIT/ML pen   No No   Sig: Inject 30 Units Subcutaneous every morning (before breakfast)   ipratropium - albuterol 0.5 mg/2.5 mg/3 mL (DUONEB) 0.5-2.5 (3) MG/3ML neb solution   No No   Sig: Take 1 vial (3 mLs) by nebulization 4 times daily   lactulose (CHRONULAC) 10 GM/15ML solution   No No   Si mLs (20 g) by Oral or Feeding Tube route 3 times daily   midodrine (PROAMATINE) 10 MG tablet   No No   Si tablet (10 mg) by Oral or Feeding Tube route 3 times daily (with meals)   nystatin (MYCOSTATIN) 781146 UNIT/ML suspension   No No   Sig: Swish and swallow 5 mLs (500,000 Units) in mouth 4 times daily   ondansetron (ZOFRAN ODT) 4 MG ODT tab   No No   Sig: Take 1 tablet (4 mg) by mouth every 6 hours as needed for nausea or vomiting   order for DME   No No   Sig: Equipment being ordered: Class 2 (30-40mmHg) compression knee high stockings, night  time knee high compression alternative, bandaging supplies   Patient not taking: Reported on 2019   order for DME   No No   Sig: Equipment being ordered: Compression knee high stockings for B LE lymphedema 20-30mmHg   Patient not taking: Reported on 2019   oxyCODONE (ROXICODONE) 5 MG tablet   No No   Si tablet (5 mg) by Per Feeding Tube route every 4 hours as needed for moderate pain (4-6)   pantoprazole (PROTONIX) 2 mg/mL SUSP suspension   No No   Si mLs (40 mg) by Per Feeding Tube route every morning (before breakfast)   rifaximin (XIFAXAN) 550 MG TABS tablet   No No   Si tablet (550 mg) by Per Feeding Tube route 2 times daily   tacrolimus (GENERIC EQUIVALENT) 1 mg/mL suspension   No No   Si mL (1 mg) by Oral or Feeding Tube route 2 times daily      Facility-Administered Medications: None        Review of Systems    The 10 point Review of Systems is negative other than noted in the HPI or here.      Physical Exam   Vital Signs: Temp: 98  F (36.7  C) Temp src: Oral BP: 109/76 Pulse: 96   Resp: 16         '  Weight: 0 lbs 0 oz  Constitutional: Awake, alert, cooperative, no apparent distress. Frail, cachectic appearing.   Eyes: Conjunctiva and pupils examined and normal.  HEENT: Moist mucous membranes, normal dentition.  Respiratory: Clear to auscultation bilaterally, no crackles or wheezing. Diminished at the bases. Trach present and capped.   Cardiovascular: Regular rate and rhythm, normal S1 and S2, and no murmur noted.  GI: Soft, distended, tender throughout, + ascites, GJ tube present   Skin: No rashes, no cyanosis, no edema. Multiple bruises on chest, neck and arms bilaterally   Musculoskeletal: No joint swelling, erythema or tenderness.  Neurologic: Cranial nerves 2-12 intact, generally weak   Psychiatric: Alert, oriented to person, place and time, no obvious anxiety or depression.      Medical Decision Making       120 MINUTES SPENT BY ME on the date of service doing chart review,  history, exam, documentation & further activities per the note.      Data       Imaging results reviewed over the past 24 hrs:   No results found for this or any previous visit (from the past 24 hour(s)).

## 2023-01-22 NOTE — CONSULTS
"CLINICAL NUTRITION SERVICES  -  ASSESSMENT NOTE      RECOMMENDATIONS FOR MD/PROVIDER TO ORDER:   Consider rechecking FT placement as pt c/o stomach ache and nausea when TF running   Recommend antiemetics prior to meals and TF bolus      Recommendations Ordered by Registered Dietitian (RD):   Calorie counts to begin tomorrow 1/23-1/25 to assess PO intake and need for more strict TF schedule     Trial lower volume TF back up bolus' and continue to encourage oral intake -->   Novasource renal at 80 mL/hr x 3 hrs TID as back up bolus after meals - IF CONSUMES <50% OF MEAL  This is equivalent to 240 mL formula TID or 720 mL/day = 1440 kcal, 66 g protein, 132 g CHO, 0 g fiber and 516 mL free water (meeting approx 58% estimated energy needs and 66% estimated protein needs from TF alone)    Free water flush 100 mL before and after bolus as previously outlined   Additional free water for hydration pending PO intake     Continue diet and nutritional supplements per MD order      Malnutrition:   % Weight Loss:  > 5% in 1 month (severe malnutrition)  % Intake:  </= 50% for >/= 5 days (severe malnutrition)- suspected   Subcutaneous Fat Loss:  Orbital region moderate depletion, Upper arm region moderate-severe depletion and Thoracic region moderate-severe depletion  Muscle Loss:  Temporal region moderate depletion, Clavicle bone region severe depletion, Acromion bone region severe depletion, Patellar region moderate depletion and Anterior thigh region moderate-severe depletion  Fluid Retention:  Trace    Malnutrition Diagnosis: Severe malnutrition  In Context of:  Acute on Chronic illness or disease        REASON FOR ASSESSMENT  Blanco Osborne is a 57 year old male seen by Registered Dietitian for Provider Order - \"tube feedings\"  (incorrect nutrition consult needed for TF ordering rights --> ordered correct consult under MD today)     NUTRITION HISTORY  Chart reviewed and discussed with patient and brother at bedside   Patient " "well known to nutrition services from multiple recent admissions to [facility] and [facility] - he has been followed closing by our team for TF management    When admitted to [facility] between 11/12/22-12/15/22, patient initially started on TF Promote on 11/14 then changed to Novasource renal on 11/24, being fed continuously x24 hrs     Trach/PEG done 11/29/22    Per recent LTACH RD notes from 12/29/22-1/21/23 - continuous TF changed to back up bolus on 1/13. He was documented to be eating 25-50% of his meals from 1/13-1/16 plus refusal of bolus feedings. Appetite and intake then noted to had improved with up to 50-75% meal consumption between 1/16-1/19 to the point where back up bolus feedings were not needed.   He had weaned off ventilation at [facility] as well, now stable on RA    Patient and brother report today that recent PO intake has been limited and he has declined back up bolus feedings for the past 3 days d/t stomach aches and nausea. Denies use of antiemetics     CURRENT NUTRITION ORDERS  Diet Order:     Soft/bite size, moderately thick liquids   Gelatein Plus w/ lunch and dinner (MD order)  Ensure Enlive (strawberry) 10am and 2 om (MD order)     Nutrition Support Enteral: (resumed per MD overnight)   Type of Feeding Tube: gastrostomy   Enteral Frequency:  Back up bolus   Enteral Regimen: Novasource Renal at 125 mL/hr x2 hrs (250 mL total) as back up bolus feeding after each meal \"only if intake <50% of meal\"  Enteral Provisions per bolus: 500 kcal, 23 g protein, 46 g carbohydrate, 0 g fiber, 179 ml free water   Free Water Flush: 100 mL before and after each bolus if given       Current Intake/Tolerance:  Patient had an Ensure and OJ this morning and feels OK  Feels unable to eat a \"full plate\" of food as he experiences fullness, bloating and stomach ache  We discussed various options for resuming enteral nutrition today --> patient and brother agreeable to try lower volume bolus feedings today and continue to take PO as " able. May be open to considering nocturnal feedings as a second option as well but this did interfere with his sleep in the past       NUTRITION FOCUSED PHYSICAL ASSESSMENT FOR DIAGNOSING MALNUTRITION)  Yes         Observed:    Muscle wasting (refer to documentation in Malnutrition section) and Subcutaneous fat loss (refer to documentation in Malnutrition section)    Obtained from Chart/Interdisciplinary Team:  Trace edema     ANTHROPOMETRICS  Height: 6'   Weight: 181 lbs (82.4 kg)   BMI: 24.64 kg/m^2  Weight Status:  Normal BMI  IBW: 81 kg   % IBW: 102%  Weight History: Patient is down 30 lbs in the past month (14% body wt). Suspect this is a combination of fluid w/ dialysis and paracentesis plus true wt loss given the significance   Wt Readings from Last 10 Encounters:   01/21/23 82.4 kg (181 lb 11.2 oz)   12/28/22 96 kg (211 lb 10.3 oz)   12/16/22 100.2 kg (221 lb)   12/16/22 100.2 kg (221 lb)   12/15/22 87 kg (191 lb 12.8 oz)   10/07/19 137.5 kg (303 lb 3.2 oz)   10/04/19 131.5 kg (290 lb)   02/20/19 140.4 kg (309 lb 9.6 oz)   07/10/18 137.5 kg (303 lb 3.2 oz)   10/31/17 133.8 kg (295 lb)       LABS  Labs reviewed  Na 136  K 3.8  No Mg/Phos   BUN 33.6 (H), Cr 2.68 (H)   (H)  Recent Labs   Lab 01/22/23  0528 01/22/23  0216 01/21/23  2302 01/21/23  0824 01/21/23  0543 01/21/23  0310   GLC 90 102* 114* 91 92 106*     Lab Results   Component Value Date    URINEKETONE Negative 11/25/2022    URINEKETONE 5 10/31/2017       MEDICATIONS  Medications reviewed  Nephronex liquid   Folic acid   MSSI   Lactulose BID    ASSESSED NUTRITION NEEDS PER APPROVED PRACTICE GUIDELINES:    Dosing Weight 82.4 kg   Estimated Energy Needs: 6826-7752 kcals (30-35 Kcal/Kg)  Justification: repletion and HD  Estimated Protein Needs: + grams protein (1.2-1.5+ g pro/Kg)  Justification: Repletion and dialysis  Estimated Fluid Needs: per provider pending fluid status      MALNUTRITION:  % Weight Loss:  > 5% in 1 month (severe  malnutrition)  % Intake:  </= 50% for >/= 5 days (severe malnutrition) -suspected  Subcutaneous Fat Loss:  Orbital region moderate depletion, Upper arm region moderate-severe depletion and Thoracic region moderate-severe depletion  Muscle Loss:  Temporal region moderate depletion, Clavicle bone region severe depletion, Acromion bone region severe depletion, Patellar region moderate depletion and Anterior thigh region moderate-severe depletion  Fluid Retention:  Trace    Malnutrition Diagnosis: Severe malnutrition  In Context of:  Acute on Chronic illness or disease    NUTRITION DIAGNOSIS:  Inadequate protein-energy intake related to decreased appetite and refusal of TF with stomach ache/nausea as evidenced by intake of PO and EN likely <50% for at least 3 days PTA      NUTRITION INTERVENTIONS  Recommendations / Nutrition Prescription  Calorie counts to begin tomorrow 1/23-1/25 to assess PO intake and need for more strict TF schedule     Trial lower volume TF back up bolus' and continue to encourage oral intake. See above     Continue diet and nutritional supplements per MD order       Implementation  Nutrition education: Per Provider order if indicated. Reviewed options for TF modifications   EN Composition, EN Schedule and Feeding Tube Flush: as above       Nutrition Goals  EN + PO to meet % estimated needs within 3 days       MONITORING AND EVALUATION:  Progress towards goals will be monitored and evaluated per protocol and Practice Guidelines        Deann Rainey RD, LD  Clinical Dietitian   Weekend pager [phone]

## 2023-01-22 NOTE — CONSULTS
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     NAFLD (nonalcoholic fatty liver disease) 2/11/2016     CHRISTIAN on CPAP      Parainfluenza type 1 infection 11/19/2022     SBP (spontaneous bacterial peritonitis) (H)     [facility]     Sepsis due to Streptococcus pneumoniae with acute hypoxic respiratory failure (H) 11/19/2022     Tubular adenoma 10/2019    Large cecal adenoma- due for surveillance colonoscopy in 3 years (10/2022)       Past Surgical History:  Past Surgical History:   Procedure Laterality Date     APPENDECTOMY       BENCH LIVER N/A 9/20/2016    Procedure: BENCH LIVER;  Surgeon: Enoc Crews MD;  Location: [facility] OR     BRONCHOSCOPY FLEXIBLE AND RIGID N/A 12/20/2022    Procedure: BRONCHOSCOPY;  Surgeon: Alena Valenzuela MD;  Location:  GI     COLONOSCOPY  8/6/13    repeat in 2018     COLONOSCOPY N/A 10/4/2019    Procedure: COLONOSCOPY, WITH POLYPECTOMY AND BIOPSY;  Surgeon: Go Chong MD;  [facility]     HERNIA REPAIR       IR CHEST TUBE PLACEMENT NON-TUNNELED RIGHT  12/12/2022     IR CVC TUNNEL PLACEMENT > 5 YRS OF AGE  12/6/2022     IR GASTROSTOMY TUBE PERCUTANEOUS PLCMNT  11/29/2022     IR PARACENTESIS  11/29/2022     IR PARACENTESIS  12/1/2022     IR THORACENTESIS  12/6/2022     IR TRANSCATHETER BIOPSY  12/1/2022     TRACHEOSTOMY N/A 11/29/2022    Procedure: TRACHEOSTOMY;  Surgeon: Nilesh Jackson MD;   "Location:  OR     TRANSPLANT LIVER RECIPIENT  DONOR N/A 2016    Procedure: TRANSPLANT LIVER RECIPIENT  DONOR;  Surgeon: Enoc Crews MD;  Location: [facility] OR       History of Present Illness  57 year old male who presents as a direct transfer for evaluation of loculated pleural effusion. He is feeling ok currently.He denies any current shortness of breath. He reports that he has pain essentially everywhere. He does have increased abdominal pain and distension. He has had difficulty sleeping and poor appetite.     The patient has a very complex recent history. [facility]   \"Patient has multiple medical problems including history of liver transplant , PAF on chronic anticoagulation, history of DM2, CVA, HTN, CHRISTIAN on BiPAP, and history of alcohol abuse in the past causing liver damage ending with liver transplant.  in mid November he had respiratory arrest and was diagnosed with infection with parainfluenza and strep pneumoniae and acute on chronic renal insufficiency ending with CRF needing 3/week hemodialysis.  During this period he was diagnosed with cirrhosis of transplanted liver and hyperammonia.  Currently he is on Lactulose and Rifaximin.      On 22, due to CIP (critical illness polyneuropathy), he was transferred to [facility] for the first time with Trach, PEG and Rt chest tube.    On 2022 patient was transferred back to [facility] due to respiratory insufficiency secondary to hydropneumothorax and drainage of 900 cc bloody pleural effusion, bloody stool and transfusion of 2 units PRBC.    On 2022 he had another episode of PEA arrest followed by CPR x2 minutes and possibly had seizure activity in [facility]. 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 His respiratory status is stable on RA. He remains on dialysis MWF. He is being transferred to [facility] for management of complicated pleural effusion.     Seen by Dr Emmanuel    Mild cough with some sputum, breathing ok  Has pain R chest and in abdomen, also some pain anterior chest  Rectal tube loose stool  Eating but needs more protein  Think still confused  Making good rehab progress at [place] per brother      On  fluconazole, to finish 4-week course.    Antibiotics:  none    Review of Systems: 10 point ROS o/w neg    Social History: lives in [place], brother present  Social History     Tobacco Use     Smoking status: Never     Smokeless tobacco: Never   Substance Use Topics     Alcohol use: Not Currently     Alcohol/week: 0.0 standard drinks        Family History:  No significant FHX    Allergies:  This patient is allergic to has No Known Allergies.     Physical Exam:  Vitals were reviewed  Blood pressure 107/76, pulse 101, temperature 98  F (36.7  C), resp. rate 20, SpO2 95 %.  GEN: alert, O x 2  Very weak  R permcath site ok  R picc site ok  HEENT: interic  LUNG: crackles R base, diminished R base, L clear  COR: RRR  ABDOMEN: protuberant, ascites, mildly tender  EXT: 2+ edema  Jaundiced  Rectal tube loose stool  Seems confused, slow responses    CT chest/abd 1/18/23-  IMPRESSION:  1.  Small bilateral partially loculated pleural effusions with associated pleural enhancement consistent with infected effusions. Size mildly decreased compared to 01/06/2023.     2.  Large volume ascites with thin peritoneal enhancement consistent with bacterial peritonitis. Volume of ascites is similar to the prior CT. Mesenteric and omental fat stranding is nonspecific, but most likely related to underlying infection and portal   hypertension.     3.  Percutaneous gastrostomy tube balloon is now within the lumen and no longer closely apposed to the gastric wall, suggesting interval displacement.  Correlate for malfunction.     CT 1/22-personally reviewed-  FINDINGS:   LUNGS AND PLEURA: Small pleural effusion on the right with moderately extensive pleural thickening. Minimal effusion on the left with similar moderate pleural thickening. Areas of atelectasis versus less likely infiltrate are seen bilaterally.     MEDIASTINUM/AXILLAE: No gross adenopathy in the absence of contrast. No aortic aneurysm demonstrated.     CORONARY ARTERY CALCIFICATION: Moderate.     UPPER ABDOMEN: Splenomegaly at 15.8 cm. Ascites and possibly infiltrative changes in the omentum. These are better seen on recent imaging of the abdomen and pelvis.     MUSCULOSKELETAL: No frankly destructive bony lesions.                                                                      IMPRESSION:   1.  Small right and minimal left effusions with moderately extensive pleural thickening bilaterally. Effusions are similar to previous.  2.  Mild probable atelectasis versus less likely infiltrate in both lungs. This is similar to previous.  Data:  CBC  Recent Labs   Lab 01/22/23  0534 01/19/23  0631 01/19/23  0627   WBC 7.0  --  7.2   HGB 8.4*   < > 5.9*     --  238    < > = values in this interval not displayed.       BMP  Recent Labs   Lab 01/22/23  0528 01/22/23  0216 01/21/23  2302 01/21/23  0824 01/19/23  2328 01/19/23  0631 01/19/23  0627 01/17/23  1903 01/17/23  0952 01/17/23  0555 01/16/23  0635     --   --   --   --  135* 134*  --  136  --  133*   POTASSIUM 3.8  --   --   --   --  3.6 3.7  --  3.6  --  4.0   CHLORIDE 94*  --   --   --   --   --  96*  --   --   --  94*   KELLY 9.1  --   --   --   --   --  8.9  --   --   --  9.4   CO2 29  --   --   --   --   --  28  --   --   --  27   BUN 33.6*  --   --   --   --   --  26.3*  --   --   --  54.7*   CR 2.68*  --   --   --   --   --  2.21*  --   --   --  3.18*   GLC 90 102* 114* 91   < > 71 73   < > 104   < > 96    < > = values in this interval not displayed.       CULTURESNo results for  input(s): CULT in the last 168 hours.   BC 1/13 x 2 NG  R pleural fluid 1/13 NOS, NG, WBC 7918  Ascites NOS, NG 1/13   Ascites 12/22 broth only Lactobacillus S ampicillin    Attestation:  I have reviewed today's vital signs, notes, medications, labs and imaging.     ASSESSMENT:  1. SMALL LOCULATED R PLEURAL EFFUSION-CXS negative, had recent pneumococcal pneumonia 11/22; prior cx + C.parapsilosis in broth  2. LACTOBACILLUS, CANDIDA PERITONITIS-RX unasyn; transitioned to oral Augmentin for 4 weeks.  Augmentin completed 1/11/2023. On 4 week flucon course to 1/23    3. HEPATIC ENCEPHALOPATHY  4. ETOH CIRRHOSIS, S/P LIVER TRANSPLANT, ongoing cirrhosis  5. MALNUTRITION  6. RECENT PNEUMOCOCCAL SEPSIS, ARDS, MOF,   7. H/O PEA ARREST  8. LIVER TRANSPLANT 2016  9. R PNEUMOTHORAX-resolved  9. ESRD, on HD  10. SEIZURES      RECOMMENDATION  1.  F/U Dr Manuel conway, recs re R pleural effusion  2. Cont flucon through 1/23 then think  Stop  3. No antibiotics now  4. Consider paracentesis  5. Work on nutrition, PT    thanks for asking me to see him!     RANDALL MORATAYA M.D.  O:[phone]   B:[phone]

## 2023-01-22 NOTE — CONSULTS
[facility]    Nephrology Consultation     Date of Admission:  1/21/2023    Assessment & Plan     Blanco Osborne is a 57 year old male with ESKD who was admitted on 1/21/2023.     1) ESKD:  ATN on top of CKD without recovery.  He has been on chronic HD MWF via R internal jugular CVC.  He runs for 3 H with removal of 1-2 KG as BP allows. BP supported with midodrine.  .      2) Loculated Pleural effusions.  Snall on R and minimal on L.      3) Anemia:  HGB 8.4.  He has been on EPO 40K weekly.      4) Chronic Hypotension: BP supported with midodrine.      5) MBD:  Phos 3.9.  Will check PTH and Vit D    Plan:    HD tomorrow.        Kb Moulton MD  [facility] - Nephrology  [phone]    Reason for Consult     I was asked to see the patient for ESKD.     Primary Care Physician     Physician No Ref-Primary    Chief Complaint     ESKD    History is obtained from the patient and chart review.      History of Present Illness     Blanco Osborne is a 57 year old male with ESKD who presents with loculated pleural effusion.     He has a long PMH notable for:      liver transplant 2016, PAF on chronic anticoagulation, history of DM2, CVA, HTN, CHRISTIAN on BiPAP, and history of alcohol abuse in the past causing liver damage ending with liver transplant    He has had a long and complicated course in recent months notable for:    Respiratory Arrest in November due to parainfluenza and Strep pneumia  Resp failure with tracheostomy.  LORETTA on top of CCKD resulting in dialysis dependence.  It appears ESKD.  On HD MWF.  Cirrhosis of transplanted liver  Critical Illness Polyneuropathy  Hydropneumothorax  PEA arrest  Seizures    He has been liberated from vent but still needs HD.  His trach is still in place.      Past Medical History   I have reviewed this patient's medical history and updated it with pertinent information if needed.   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  Past Surgical History:   Procedure Laterality Date     APPENDECTOMY       BENCH LIVER N/A 2016    Procedure: BENCH LIVER;  Surgeon: Enoc Crews MD;  Location: [facility] OR     BRONCHOSCOPY FLEXIBLE AND RIGID N/A 2022    Procedure: BRONCHOSCOPY;  Surgeon: Alena Valenzuela MD;  Location:  GI     COLONOSCOPY  13    repeat in 2018     COLONOSCOPY N/A 10/4/2019    Procedure: COLONOSCOPY, WITH POLYPECTOMY AND BIOPSY;  Surgeon: Go Chong MD;  [facility] OR     HERNIA REPAIR       IR CHEST TUBE PLACEMENT NON-TUNNELED RIGHT  2022     IR CVC TUNNEL PLACEMENT > 5 YRS OF AGE  2022     IR GASTROSTOMY TUBE PERCUTANEOUS PLCMNT  2022     IR PARACENTESIS  2022     IR PARACENTESIS  2022     IR THORACENTESIS  2022     IR TRANSCATHETER BIOPSY  2022     TRACHEOSTOMY N/A 2022    Procedure: TRACHEOSTOMY;  Surgeon: Nilesh Jackson MD;  Location:  OR     TRANSPLANT LIVER RECIPIENT  DONOR N/A 2016    Procedure: TRANSPLANT LIVER RECIPIENT  DONOR;  Surgeon: Enoc Crews MD;  Location: [facility] OR       Prior to Admission Medications   Prior to Admission Medications   Prescriptions Last  Dose Informant Patient Reported? Taking?   Multiple Vitamins-Minerals (MULTIVITAMINS W/MINERALS) liquid   No No   Sig: 15 mLs by Per Feeding Tube route daily   acetylcysteine (MUCOMYST) 20 % neb solution 2023  No Yes   Sig: Take 2 mLs by nebulization 2 times daily   albuterol (PROVENTIL) (2.5 MG/3ML) 0.083% neb solution 2023  No Yes   Sig: Take 1 vial (2.5 mg) by nebulization every 2 hours as needed for shortness of breath   apixaban ANTICOAGULANT (ELIQUIS) 5 MG tablet 2023 at on hold  No Yes   Si tablet (5 mg) by Oral or Feeding Tube route 2 times daily   calcium carbonate (TUMS) 500 MG chewable tablet   Yes Yes   Sig: Take 1-2 chew tab by mouth 4 times daily as needed for heartburn   carboxymethylcellulose PF (REFRESH PLUS) 0.5 % ophthalmic solution 2023  No Yes   Sig: Place 1 drop into both eyes 3 times daily   famotidine (PEPCID) 20 MG tablet   Yes Yes   Sig: Take 20 mg by mouth 2 times daily as needed (heartburn)   folic acid 5 MG/ML injection 2023  No Yes   Sig: Inject 0.2 mLs (1 mg) into the vein daily   glucose 40 % (400 mg/mL) gel   No No   Sig: Take 15-30 g by mouth every 15 minutes as needed for low blood sugar   hydrocortisone sodium succinate PF (SOLU-CORTEF) 100 MG injection Not Taking  No No   Sig: Inject 0.5 mLs (25 mg) into the vein every 6 hours   Patient not taking: Reported on 2023   insulin glargine (LANTUS PEN) 100 UNIT/ML pen 2023 at 5 units  No Yes   Sig: Inject 30 Units Subcutaneous every morning (before breakfast)   ipratropium - albuterol 0.5 mg/2.5 mg/3 mL (DUONEB) 0.5-2.5 (3) MG/3ML neb solution   No Yes   Sig: Take 1 vial (3 mLs) by nebulization 4 times daily   lactulose (CHRONULAC) 10 GM/15ML solution 2023 at am  No Yes   Si mLs (20 g) by Oral or Feeding Tube route 3 times daily   midodrine (PROAMATINE) 10 MG tablet 2023 at x3  No Yes   Si tablet (10 mg) by Oral or Feeding Tube route 3 times daily (with meals)   nystatin  (MYCOSTATIN) 485540 UNIT/ML suspension 2023 at 1800  No Yes   Sig: Swish and swallow 5 mLs (500,000 Units) in mouth 4 times daily   ondansetron (ZOFRAN ODT) 4 MG ODT tab   No Yes   Sig: Take 1 tablet (4 mg) by mouth every 6 hours as needed for nausea or vomiting   order for DME   No No   Sig: Equipment being ordered: Class 2 (30-40mmHg) compression knee high stockings, night time knee high compression alternative, bandaging supplies   Patient not taking: Reported on 2019   order for DME   No No   Sig: Equipment being ordered: Compression knee high stockings for B LE lymphedema 20-30mmHg   Patient not taking: Reported on 2019   oxyCODONE (ROXICODONE) 5 MG tablet   No Yes   Si tablet (5 mg) by Per Feeding Tube route every 4 hours as needed for moderate pain (4-6)   pantoprazole (PROTONIX) 2 mg/mL SUSP suspension 2023 at am  No Yes   Si mLs (40 mg) by Per Feeding Tube route every morning (before breakfast)   rifaximin (XIFAXAN) 550 MG TABS tablet 2023 at am  No Yes   Si tablet (550 mg) by Per Feeding Tube route 2 times daily   tacrolimus (GENERIC EQUIVALENT) 1 mg/mL suspension 2023 at x2  No Yes   Si mL (1 mg) by Oral or Feeding Tube route 2 times daily      Facility-Administered Medications: None     Allergies   No Known Allergies    Social History   I have reviewed this patient's social history and updated it with pertinent information if needed. Blanco Osborne  reports that he has never smoked. He has never used smokeless tobacco. He reports that he does not currently use alcohol. He reports that he does not use drugs.    Family History   I have reviewed this patient's family history and updated it with pertinent information if needed.   No family history on file.    Review of Systems   The 10 point Review of Systems is negative other than noted in the HPI.     Physical Exam   Temp: 98  F (36.7  C) Temp src: Oral BP: 107/76 Pulse: 101   Resp: 20 SpO2: 95 % O2 Device:  None (Room air)    Vital Signs with Ranges  Temp:  [97.4  F (36.3  C)-98  F (36.7  C)] 98  F (36.7  C)  Pulse:  [] 101  Resp:  [14-27] 20  BP: ()/(60-79) 107/76  FiO2 (%):  [21 %] 21 %  SpO2:  [90 %-98 %] 95 %  0 lbs 0 oz    GENERAL: chronically ill, alert, NAD  HEENT:  Normocephalic. No gross abnormalities.  Pupils equal.  MMM.  Dentition is fair. Tracheostomy in place.    CV: RRR, no murmurs, no clicks, gallops, or rubs, tr BLE edema, no carotid bruits  RESP: insp crackles bases. R internal jugular CVC in place.     GI: Abdomen soft/nt/nd, BS normal. No masses, organomegaly  MUSCULOSKELETAL: extremities nl - no gross deformities noted  SKIN: scattered ecchymoses.    NEURO:  Globally weak.    PSYCH: mood and affect appropriate to current state.   LYMPH: No palpable ant/post cervical and supraclavicular adenopathy    Data   BMP  Recent Labs   Lab 01/22/23  0528 01/22/23  0216 01/21/23  2302 01/21/23  0824 01/19/23  2328 01/19/23  0631 01/19/23  0627 01/17/23  1903 01/17/23  0952 01/17/23  0555 01/16/23  0635     --   --   --   --  135* 134*  --  136  --  133*   POTASSIUM 3.8  --   --   --   --  3.6 3.7  --  3.6  --  4.0   CHLORIDE 94*  --   --   --   --   --  96*  --   --   --  94*   KELLY 9.1  --   --   --   --   --  8.9  --   --   --  9.4   CO2 29  --   --   --   --   --  28  --   --   --  27   BUN 33.6*  --   --   --   --   --  26.3*  --   --   --  54.7*   CR 2.68*  --   --   --   --   --  2.21*  --   --   --  3.18*   GLC 90 102* 114* 91   < > 71 73   < > 104   < > 96    < > = values in this interval not displayed.     [email or web address](phos:4)  CBC)  Recent Labs   Lab 01/22/23  0534 01/19/23  1034 01/19/23  0631 01/19/23  0627   WBC 7.0  --   --  7.2   HGB 8.4* 8.1* 7.7* 5.9*   HCT 28.6*  --   --  19.8*   MCV 99  --   --  99     --   --  238     Recent Labs   Lab 01/22/23  0528 01/19/23  0627   AST 24 20   ALT 9* 8*   ALKPHOS 260* 236*   BILITOTAL 0.5 0.5   CHANDLER  --  50

## 2023-01-22 NOTE — PROGRESS NOTES
Resident continue to have poor appetite and refuse to take bolus tube feeding. Ate only  25% of dinner. Resident complained of back pain of 5/10, prn tylenol given with good result. Resident is set to be transfered to [facility] for paracentesis and a chest tube placement. Writer called  [facility] and gave detailed report to the assigned nurse (Misty). Resident mojgan picked up resident at 8:05pm with his belongings.

## 2023-01-22 NOTE — PROGRESS NOTES
THORACIC SURGERY CONSULTATION    58 yo man    Complicated medical hx  Send back from [facility] re bilateral pleural effusions with concern for empyemas  Known chronic effusions.  Cultures of pleural fluid never + in the past.  Asymptomatic.  On RA    Unable to see CT from 01/18/2023    Discussed with pt and wife    Doubt empyema    Based on CT findings, possible diagnostic thoracentesis     Will follow    KEYONNA RANDHAWA MD [facility] ONCOLOGY THORACIC SURGERY  CELL:  [phone]  OFFICE: [phone]    Repeat CT chest w/o today

## 2023-01-22 NOTE — PLAN OF CARE
Alert and oriented x4, forgetful. VSS ex BP soft. On RA. Trache capped. Tolerating moderately thick liquid. Asisst of 2, t/r. Lung sounds diminished, fine crackles at bases. Bowel sounds hypoactive/ faint. Rectal tube in place, moderate BM. Anuric on HD. HD cath on R chest, CDI. Skin Jaundice. Multiple wounds (see flow sheet). Denies pain/ nausea. Neuro's and CMS intact. Tele NSR. Triple lumen PICC SL, flushes well with good blood return. Plan for Paracentesis and CT placement today.

## 2023-01-22 NOTE — PHARMACY-ADMISSION MEDICATION HISTORY
Pharmacy Medication History  Admission medication history interview status for the 1/21/2023  admission is complete. See EPIC admission navigator for prior to admission medications     Location of Interview:   Medication history sources: MAR ([place] in Epic), discharge summary    Significant changes made to the medication list:  -meds pt was taking not listed on PTA list include: lacosamide, Keppra. They are continued here.     In the past week, patient estimated taking medication this percent of the time: greater than 90%      Additional medication history information:       Medication reconciliation completed by provider prior to medication history? Yes    Time spent in this activity: 25 min    Prior to Admission medications    Medication Sig Last Dose Taking? Auth Provider Long Term End Date   acetylcysteine (MUCOMYST) 20 % neb solution Take 2 mLs by nebulization 2 times daily 1/20/2023 Yes Rainer Jones MD     albuterol (PROVENTIL) (2.5 MG/3ML) 0.083% neb solution Take 1 vial (2.5 mg) by nebulization every 2 hours as needed for shortness of breath 1/20/2023 Yes Rainer Jones MD Yes    apixaban ANTICOAGULANT (ELIQUIS) 5 MG tablet 1 tablet (5 mg) by Oral or Feeding Tube route 2 times daily 1/18/2023 at on hold Yes Rainer Jones MD     calcium carbonate (TUMS) 500 MG chewable tablet Take 1-2 chew tab by mouth 4 times daily as needed for heartburn  Yes Unknown, Entered By History     carboxymethylcellulose PF (REFRESH PLUS) 0.5 % ophthalmic solution Place 1 drop into both eyes 3 times daily 1/21/2023 Yes Rainer Jones MD     famotidine (PEPCID) 20 MG tablet Take 20 mg by mouth 2 times daily as needed (heartburn)  Yes Unknown, Entered By History     folic acid 5 MG/ML injection Inject 0.2 mLs (1 mg) into the vein daily 1/21/2023 Yes Rainer Jones MD     insulin glargine (LANTUS PEN) 100 UNIT/ML pen Inject 30 Units Subcutaneous every morning (before  breakfast) 1/21/2023 at 5 units Yes Rainer Jones MD Yes    ipratropium - albuterol 0.5 mg/2.5 mg/3 mL (DUONEB) 0.5-2.5 (3) MG/3ML neb solution Take 1 vial (3 mLs) by nebulization 4 times daily  Yes Rainer Jones MD Yes    lactulose (CHRONULAC) 10 GM/15ML solution 30 mLs (20 g) by Oral or Feeding Tube route 3 times daily 1/21/2023 at am Yes Rainer Jones MD     midodrine (PROAMATINE) 10 MG tablet 1 tablet (10 mg) by Oral or Feeding Tube route 3 times daily (with meals) 1/21/2023 at x3 Yes Rainer Jones MD     nystatin (MYCOSTATIN) 656588 UNIT/ML suspension Swish and swallow 5 mLs (500,000 Units) in mouth 4 times daily 1/21/2023 at 1800 Yes Rainer Jones MD     ondansetron (ZOFRAN ODT) 4 MG ODT tab Take 1 tablet (4 mg) by mouth every 6 hours as needed for nausea or vomiting  Yes Rainer Jones MD     oxyCODONE (ROXICODONE) 5 MG tablet 1 tablet (5 mg) by Per Feeding Tube route every 4 hours as needed for moderate pain (4-6)  Yes Rainer Jones MD     pantoprazole (PROTONIX) 2 mg/mL SUSP suspension 20 mLs (40 mg) by Per Feeding Tube route every morning (before breakfast) 1/21/2023 at am Yes Rainer Jones MD     rifaximin (XIFAXAN) 550 MG TABS tablet 1 tablet (550 mg) by Per Feeding Tube route 2 times daily 1/21/2023 at am Yes Rainer Jones MD     tacrolimus (GENERIC EQUIVALENT) 1 mg/mL suspension 1 mL (1 mg) by Oral or Feeding Tube route 2 times daily 1/21/2023 at x2 Yes Rainer Jones MD No    glucose 40 % (400 mg/mL) gel Take 15-30 g by mouth every 15 minutes as needed for low blood sugar   Rainer Jones MD     hydrocortisone sodium succinate PF (SOLU-CORTEF) 100 MG injection Inject 0.5 mLs (25 mg) into the vein every 6 hours  Patient not taking: Reported on 1/21/2023 Not Taking  Rainer Jones MD Yes    Multiple Vitamins-Minerals (MULTIVITAMINS W/MINERALS) liquid 15  mLs by Per Feeding Tube route daily   Rainer Jones MD No    order for DME Equipment being ordered: Compression knee high stockings for B LE lymphedema 20-30mmHg  Patient not taking: Reported on 2/20/2019   Juan Simms MD     order for DME Equipment being ordered: Class 2 (30-40mmHg) compression knee high stockings, night time knee high compression alternative, bandaging supplies  Patient not taking: Reported on 2/20/2019   Juan Simms MD     Vimpat Soln 100 mg bid  Keppra Soln 1000 mg daily  The information provided in this note is only as accurate as the sources available at the time of update(s)

## 2023-01-23 LAB
ALBUMIN SERPL BCG-MCNC: 1.9 G/DL (ref 3.5–5.2)
ANION GAP SERPL CALCULATED.3IONS-SCNC: 12 MMOL/L (ref 7–15)
BASOPHILS # BLD AUTO: 0.1 10E3/UL (ref 0–0.2)
BASOPHILS NFR BLD AUTO: 1 %
BUN SERPL-MCNC: 43.7 MG/DL (ref 6–20)
C DIFF TOX B STL QL: NEGATIVE
CALCIUM SERPL-MCNC: 9.2 MG/DL (ref 8.6–10)
CHLORIDE SERPL-SCNC: 94 MMOL/L (ref 98–107)
CREAT SERPL-MCNC: 3.48 MG/DL (ref 0.67–1.17)
DEPRECATED CALCIDIOL+CALCIFEROL SERPL-MC: 21 UG/L (ref 20–75)
DEPRECATED HCO3 PLAS-SCNC: 30 MMOL/L (ref 22–29)
EOSINOPHIL # BLD AUTO: 0.2 10E3/UL (ref 0–0.7)
EOSINOPHIL NFR BLD AUTO: 3 %
ERYTHROCYTE [DISTWIDTH] IN BLOOD BY AUTOMATED COUNT: 18.6 % (ref 10–15)
GFR SERPL CREATININE-BSD FRML MDRD: 20 ML/MIN/1.73M2
GLUCOSE BLDC GLUCOMTR-MCNC: 105 MG/DL (ref 70–99)
GLUCOSE BLDC GLUCOMTR-MCNC: 106 MG/DL (ref 70–99)
GLUCOSE BLDC GLUCOMTR-MCNC: 107 MG/DL (ref 70–99)
GLUCOSE BLDC GLUCOMTR-MCNC: 97 MG/DL (ref 70–99)
GLUCOSE BLDC GLUCOMTR-MCNC: 98 MG/DL (ref 70–99)
GLUCOSE BLDC GLUCOMTR-MCNC: 99 MG/DL (ref 70–99)
GLUCOSE SERPL-MCNC: 93 MG/DL (ref 70–99)
HCT VFR BLD AUTO: 25.8 % (ref 40–53)
HGB BLD-MCNC: 7.6 G/DL (ref 13.3–17.7)
IMM GRANULOCYTES # BLD: 0.3 10E3/UL
IMM GRANULOCYTES NFR BLD: 4 %
LYMPHOCYTES # BLD AUTO: 1.7 10E3/UL (ref 0.8–5.3)
LYMPHOCYTES NFR BLD AUTO: 22 %
MAGNESIUM SERPL-MCNC: 2.1 MG/DL (ref 1.7–2.3)
MCH RBC QN AUTO: 28.9 PG (ref 26.5–33)
MCHC RBC AUTO-ENTMCNC: 29.5 G/DL (ref 31.5–36.5)
MCV RBC AUTO: 98 FL (ref 78–100)
MONOCYTES # BLD AUTO: 1.3 10E3/UL (ref 0–1.3)
MONOCYTES NFR BLD AUTO: 17 %
NEUTROPHILS # BLD AUTO: 4.1 10E3/UL (ref 1.6–8.3)
NEUTROPHILS NFR BLD AUTO: 53 %
NRBC # BLD AUTO: 0 10E3/UL
NRBC BLD AUTO-RTO: 0 /100
PHOSPHATE SERPL-MCNC: 4.5 MG/DL (ref 2.5–4.5)
PLATELET # BLD AUTO: 207 10E3/UL (ref 150–450)
POTASSIUM SERPL-SCNC: 4 MMOL/L (ref 3.4–5.3)
PTH-INTACT SERPL-MCNC: 11 PG/ML (ref 15–65)
RBC # BLD AUTO: 2.63 10E6/UL (ref 4.4–5.9)
SODIUM SERPL-SCNC: 136 MMOL/L (ref 136–145)
WBC # BLD AUTO: 7.6 10E3/UL (ref 4–11)

## 2023-01-23 PROCEDURE — 85004 AUTOMATED DIFF WBC COUNT: CPT | Performed by: STUDENT IN AN ORGANIZED HEALTH CARE EDUCATION/TRAINING PROGRAM

## 2023-01-23 PROCEDURE — 99233 SBSQ HOSP IP/OBS HIGH 50: CPT | Performed by: STUDENT IN AN ORGANIZED HEALTH CARE EDUCATION/TRAINING PROGRAM

## 2023-01-23 PROCEDURE — 120N000001 HC R&B MED SURG/OB

## 2023-01-23 PROCEDURE — 999N000040 HC STATISTIC CONSULT NO CHARGE VASC ACCESS

## 2023-01-23 PROCEDURE — 250N000012 HC RX MED GY IP 250 OP 636 PS 637: Performed by: STUDENT IN AN ORGANIZED HEALTH CARE EDUCATION/TRAINING PROGRAM

## 2023-01-23 PROCEDURE — 80069 RENAL FUNCTION PANEL: CPT | Performed by: INTERNAL MEDICINE

## 2023-01-23 PROCEDURE — 87493 C DIFF AMPLIFIED PROBE: CPT | Performed by: STUDENT IN AN ORGANIZED HEALTH CARE EDUCATION/TRAINING PROGRAM

## 2023-01-23 PROCEDURE — 93005 ELECTROCARDIOGRAM TRACING: CPT

## 2023-01-23 PROCEDURE — G0463 HOSPITAL OUTPT CLINIC VISIT: HCPCS

## 2023-01-23 PROCEDURE — 83970 ASSAY OF PARATHORMONE: CPT | Performed by: INTERNAL MEDICINE

## 2023-01-23 PROCEDURE — 250N000009 HC RX 250: Performed by: STUDENT IN AN ORGANIZED HEALTH CARE EDUCATION/TRAINING PROGRAM

## 2023-01-23 PROCEDURE — 90937 HEMODIALYSIS REPEATED EVAL: CPT

## 2023-01-23 PROCEDURE — 93010 ELECTROCARDIOGRAM REPORT: CPT | Performed by: INTERNAL MEDICINE

## 2023-01-23 PROCEDURE — 250N000011 HC RX IP 250 OP 636: Performed by: STUDENT IN AN ORGANIZED HEALTH CARE EDUCATION/TRAINING PROGRAM

## 2023-01-23 PROCEDURE — 634N000001 HC RX 634: Performed by: INTERNAL MEDICINE

## 2023-01-23 PROCEDURE — 82306 VITAMIN D 25 HYDROXY: CPT | Performed by: INTERNAL MEDICINE

## 2023-01-23 PROCEDURE — 90935 HEMODIALYSIS ONE EVALUATION: CPT | Performed by: INTERNAL MEDICINE

## 2023-01-23 PROCEDURE — 5A1D70Z PERFORMANCE OF URINARY FILTRATION, INTERMITTENT, LESS THAN 6 HOURS PER DAY: ICD-10-PCS | Performed by: INTERNAL MEDICINE

## 2023-01-23 PROCEDURE — 120N000013 HC R&B IMCU

## 2023-01-23 PROCEDURE — 250N000013 HC RX MED GY IP 250 OP 250 PS 637: Performed by: STUDENT IN AN ORGANIZED HEALTH CARE EDUCATION/TRAINING PROGRAM

## 2023-01-23 PROCEDURE — 999N000190 HC STATISTIC VAT ROUNDS

## 2023-01-23 PROCEDURE — 83735 ASSAY OF MAGNESIUM: CPT | Performed by: STUDENT IN AN ORGANIZED HEALTH CARE EDUCATION/TRAINING PROGRAM

## 2023-01-23 RX ORDER — OXYCODONE HCL 5 MG/5 ML
5 SOLUTION, ORAL ORAL
Status: COMPLETED | OUTPATIENT
Start: 2023-01-23 | End: 2023-01-24

## 2023-01-23 RX ORDER — ONDANSETRON 2 MG/ML
4 INJECTION INTRAMUSCULAR; INTRAVENOUS EVERY 6 HOURS PRN
Status: DISCONTINUED | OUTPATIENT
Start: 2023-01-23 | End: 2023-04-28 | Stop reason: HOSPADM

## 2023-01-23 RX ORDER — LIDOCAINE 4 G/G
1 PATCH TOPICAL
Status: DISCONTINUED | OUTPATIENT
Start: 2023-01-23 | End: 2023-03-28

## 2023-01-23 RX ORDER — ONDANSETRON 4 MG/1
4 TABLET, ORALLY DISINTEGRATING ORAL EVERY 6 HOURS PRN
Status: DISCONTINUED | OUTPATIENT
Start: 2023-01-23 | End: 2023-04-28 | Stop reason: HOSPADM

## 2023-01-23 RX ORDER — FOLIC ACID 5 MG/ML
1 INJECTION, SOLUTION INTRAMUSCULAR; INTRAVENOUS; SUBCUTANEOUS DAILY
Status: DISCONTINUED | OUTPATIENT
Start: 2023-01-23 | End: 2023-01-26

## 2023-01-23 RX ADMIN — LACTULOSE 20 G: 10 SOLUTION ORAL at 08:07

## 2023-01-23 RX ADMIN — EPOETIN ALFA-EPBX 13000 UNITS: 10000 INJECTION, SOLUTION INTRAVENOUS; SUBCUTANEOUS at 15:14

## 2023-01-23 RX ADMIN — LACTULOSE 20 G: 10 SOLUTION ORAL at 21:22

## 2023-01-23 RX ADMIN — ACETAMINOPHEN 650 MG: 325 TABLET, FILM COATED ORAL at 14:02

## 2023-01-23 RX ADMIN — LACOSAMIDE 100 MG: 10 SOLUTION ORAL at 08:07

## 2023-01-23 RX ADMIN — TACROLIMUS 1 MG: 5 CAPSULE ORAL at 08:07

## 2023-01-23 RX ADMIN — Medication 40 MG: at 08:09

## 2023-01-23 RX ADMIN — NYSTATIN 500000 UNITS: 100000 SUSPENSION ORAL at 14:05

## 2023-01-23 RX ADMIN — NYSTATIN 500000 UNITS: 100000 SUSPENSION ORAL at 08:07

## 2023-01-23 RX ADMIN — LEVETIRACETAM 1000 MG: 100 SOLUTION ORAL at 21:23

## 2023-01-23 RX ADMIN — FOLIC ACID 1 MG: 1 TABLET ORAL at 08:08

## 2023-01-23 RX ADMIN — LIDOCAINE 1 PATCH: 560 PATCH PERCUTANEOUS; TOPICAL; TRANSDERMAL at 14:05

## 2023-01-23 RX ADMIN — Medication 5 ML: at 08:07

## 2023-01-23 RX ADMIN — Medication 40 MG: at 15:57

## 2023-01-23 RX ADMIN — TACROLIMUS 1 MG: 5 CAPSULE ORAL at 17:25

## 2023-01-23 RX ADMIN — MIDODRINE HYDROCHLORIDE 5 MG: 5 TABLET ORAL at 08:08

## 2023-01-23 RX ADMIN — LACOSAMIDE 100 MG: 10 SOLUTION ORAL at 21:22

## 2023-01-23 RX ADMIN — WHITE PETROLATUM: 1.75 OINTMENT TOPICAL at 09:00

## 2023-01-23 RX ADMIN — ONDANSETRON 4 MG: 2 INJECTION INTRAMUSCULAR; INTRAVENOUS at 15:56

## 2023-01-23 RX ADMIN — MIDODRINE HYDROCHLORIDE 5 MG: 5 TABLET ORAL at 14:03

## 2023-01-23 RX ADMIN — RIFAXIMIN 550 MG: 550 TABLET ORAL at 08:08

## 2023-01-23 RX ADMIN — MIDODRINE HYDROCHLORIDE 5 MG: 5 TABLET ORAL at 17:25

## 2023-01-23 RX ADMIN — RIFAXIMIN 550 MG: 550 TABLET ORAL at 21:22

## 2023-01-23 RX ADMIN — NYSTATIN 500000 UNITS: 100000 SUSPENSION ORAL at 21:22

## 2023-01-23 ASSESSMENT — ACTIVITIES OF DAILY LIVING (ADL)
ADLS_ACUITY_SCORE: 64
ADLS_ACUITY_SCORE: 62
ADLS_ACUITY_SCORE: 62
ADLS_ACUITY_SCORE: 64
ADLS_ACUITY_SCORE: 62
ADLS_ACUITY_SCORE: 64
ADLS_ACUITY_SCORE: 64

## 2023-01-23 NOTE — PLAN OF CARE
"IR previously contacted RE chest tube placement.    Pt retruned from LTACH re bilateral pleural effusions with concern for empyemas.  Cardiothoracic surgery following.    No active IR order.  Per chart review, \"no indication for CT placement\".    IR will sign off.   Please reconsult IR or call with any questions or concerns.    DESI BOWER NP on 1/23/2023 at 9:34 AM   "

## 2023-01-23 NOTE — PROGRESS NOTES
"Potassium   Date Value Ref Range Status   01/23/2023 4.0 3.4 - 5.3 mmol/L Final   12/29/2022 3.4 3.4 - 5.3 mmol/L Final   04/20/2020 3.9 3.4 - 5.3 mmol/L Final     Potassium POCT   Date Value Ref Range Status   01/19/2023 3.6 3.5 - 5.0 mmol/L Final     Hemoglobin   Date Value Ref Range Status   01/23/2023 7.6 (L) 13.3 - 17.7 g/dL Final   04/20/2020 14.3 13.3 - 17.7 g/dL Final     Creatinine   Date Value Ref Range Status   01/23/2023 3.48 (H) 0.67 - 1.17 mg/dL Final   04/20/2020 1.06 0.66 - 1.25 mg/dL Final     Urea Nitrogen   Date Value Ref Range Status   01/23/2023 43.7 (H) 6.0 - 20.0 mg/dL Final   12/29/2022 46 (H) 7 - 30 mg/dL Final   04/20/2020 23 7 - 30 mg/dL Final     Sodium   Date Value Ref Range Status   01/23/2023 136 136 - 145 mmol/L Final   04/20/2020 139 133 - 144 mmol/L Final     INR   Date Value Ref Range Status   12/21/2022 1.36 (H) 0.85 - 1.15 Final   10/07/2019 1.20 (H) 0.86 - 1.14 Final      Latest Reference Range & Units Most Recent   Hep B Surface Agn Nonreactive  Nonreactive  1/12/23 06:21     DIALYSIS PROCEDURE NOTE  Hepatitis status of previous patient on machine log was checked and verified ok to use with this patients hepatitis status.  Patient dialyzed for 2.15 hrs. on a K3 bath with a net fluid removal of  0.57L.  A BFR of 350-400 ml/min was obtained via a CVC Tunelled.The treatment plan was discussed with Dr. Bradshaw during the treatment.    Total heparin received during the treatment: 0 units.       Line flushed, clamped and capped with heparin 1:1000 1.9 mL (1900 units) per lumen    Meds  given: retacrit   Complications: UF paused intermittently with 0.5L removed of 1L goal. Patient stated feeling cramping \"all over\" despite not being hypotensive. UF paused, 100mL NS administered. Patient stated that he felt \"no difference\" and could \"not tolerate\" treatment. Nephrologist notified and patient rinsed back.       Person educated: person. Knowledge base minimal. Barriers to learning: none. " Educated on procedure via verbal mode. Patient verbalized understanding. Pt prefers oral education style.     ICEBOAT? Timeout performed pre-treatment  I: Patient was identified using 2 identifiers  C:  Consent Signed Yes  E: Equipment preventative maintenance is current and dialysis delivery system OK to use    O: Dialysis orders present and complete prior to treatment  A: Vascular access verified and assessed prior to treatment  T: Treatment was performed at a clinically appropriate time  ?: Patient was allowed to ask questions and address concerns prior to treatment  See Adult Hemodialysis flowsheet in [facility] for further details and post assessment.  Machine water alarm in place and functioning. Transducer pods intact and checked every 15min.   Pt returned via bed.  Chlorine/Chloramine water system checked every 4 hours      Patient repositioned every 2 hours during the treatment.  Post treatment report given to FAHEEM Masters RN regarding 0.5L of fluid removed.

## 2023-01-23 NOTE — PLAN OF CARE
[id number]  Orientations: 2-3 disoriented to situation and place at times  Vitals/Pain: VSS on RA - trach is capped   Tele: SR  Lines/Drains: PICC to RUE, PEG tube, Rectal tube, CVC to R chest  Skin/Wounds: DTI on coccyx,   GI/: Rectal tube in place, no UO   Liquid tan stools - C diff sample to be collected  PEG tube - bolus feedings if eating less than 50% of meals  meds given through PEG tube  Labs: Abnormal/Trends Hgb 7.6  Ambulation/Assist: x2 with walker  Sleep Quality: poor - disoriented in night and pulling at cords  Dialysis MWF

## 2023-01-23 NOTE — PLAN OF CARE
A&O x 4. VSS on room air. Trach capped. Tele: sinus rhythm. Frequent position changes w/assistance x2, up to chair x2. Soft chew, moderately thick liquids, poor appetite, nausea w/food, no tube feedings this shift d/t nausea. PICC WDL, blood return in all lumens. Multiple wounds, see flowsheet. Mild shoulder pain managed w/tylenol. Rectal tube changed, slight leaking, otherwise adequate output. No void, on hemodialysis. Lots of family support. Continue plan of care.

## 2023-01-23 NOTE — PLAN OF CARE
Occupational Therapy Discharge Summary    Reason for therapy discharge:    Change in medical status.    Progress towards therapy goal(s). See goals on Care Plan in [facility] electronic health record for goal details.  Goals not met.  Barriers to achieving goals:   discharge from facility.    Therapy recommendation(s):    Continued therapy is recommended.  Rationale/Recommendations:  Continue to progress strength and activity tolerance for IND with ADLs and transfers. .    Lillian Cooley, OTR/L

## 2023-01-23 NOTE — PROGRESS NOTES
THORACIC SURGERY    Reviewed CT chest done yesterday    Very small left pleural effusion. Small chronic right pleural effusion    Could consider diagnostic right thoracentesis    No surgical drainage  Since he is asymptomatic, no indication for CT placement re: chronic organized right pleural effusion    Will follow    KEYONNA RANDHAWA MD [facility] ONCOLOGY THORACIC SURGERY  CELL:  [phone]  OFFICE: [phone]

## 2023-01-23 NOTE — DISCHARGE SUMMARY
Physical Therapy Discharge Summary    Reason for therapy discharge:    Discharged to [facility] ED    Progress towards therapy goal(s). See goals on Care Plan in [facility] electronic health record for goal details.  Goals not met.  Barriers to achieving goals:   discharge from facility.    Therapy recommendation(s):    Continued therapy is recommended.  Rationale/Recommendations:  To address weakness and impaired mobility.

## 2023-01-23 NOTE — PROGRESS NOTES
Dx: Loculated pleural effusions, extensive medical Hx  Neuro: A&Ox4, forgetful/ intermittent confusion at times but easily reoriented.  Tele/cardiac: NSR  Respiratory: LS: slightly diminished throughout, fine crackles heard bibasilary  Intermittent non-productive cough, tracheostomy capped. SpO2: 96% on RA. Unlabored breathing.   Activity:Bedfast/chairfast. Ceiling lift for transfers.  Turned/ repositioned every two hours. Standard mattress switched out w/ Pulsate mattress this shift, settings adjusted/verified.   Pain: Denied and offered no c/o pain this shift. Absent of non verbal indicators.   IV: PICC: Triple lumen, remained patent. Dressing remained CDI.   Drains/tubes: Rectal tube, PEG tube. All meds administered through PEG tube.   GI/: Pt on HD, minimal urine output. HD on M/W/F.  Rectal tube remained intact/patent w/ tan colored liquid stool. Abdomen is round/distended, Hx of needing paracentesis'  Skin: Multiple abrasions/ scabbing. Scattered bruising. Skin is slightly jaundiced. Pressure injury on coccyx, present on admission. New Mepilex sacral dressing applied.   Diet: Soft/bite sized, moderately thickened liquids w/ supplements throughout the day. Pt refusing supplemental TF on 1/22 r/t nausea. Absent of any c/o nausea this shift. Oral fluids administered throughout the shift.   Medication:  All meds administered through PEG tube.   Aggression color: green  Other: Pt remained calm/ pleasant for most of shift. Anxious at times w/ short periods of forgetfulness/intermittent confusion but easily reoriented.

## 2023-01-23 NOTE — PROGRESS NOTES
[facility]    Medicine Progress Note - Hospitalist Service    Date of Admission:  1/21/2023    Assessment & Plan   Blanco Osborne is a 57 year old male admitted on 1/21/2023 as a transfer from [facility] for evaluation of loculated pleural effusion. Patient has multiple medical problems including history of liver transplant 2016 due to alcohol abuse, pAF on chronic anticoagulation, history of DM2, CVA, HTN, CHRISTIAN on BiPAP. 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 Discharge to [facility], back to [facility] and back to [facility] during month of December.    Recently while at [facility] there were concerns for worsening effusions. He had thoracentesis 01/13 which returned exudative without growth on cultures. CT chest/abdomen/pelvis on 01/18 demonstrated worsening effusions and concerns for infected parapneumonic effusions with possible SBP.  CT findings discussed with Pulmonology team with 3 different pulmonologist including (Dr. Monge, Dr. Chu and Dr. Jay) with agreement that patient needs chest tube and possible lytics and therefore transfer to higher level of care.     See discharge summary 01/21/2023 for more details of his recent hx.     Bilateral pleural effusions, ? empyema  Acute now chronic respiratory failure requiring tracheostomy  - resolved    Pneumonia  - resolved   ARDS  - resolved    Right pneumothorax - resolved   Initial event was parainfluenza and strep pneumo pneumonia back in November 2022. Treated for ARDS. Had failed extubation x2 and tracheostomy performed last hospitalization.  Had additional complications of bilateral pneumothoraces as well as hydropneumothorax- pleural fluid grew candida parapsilosis. He was treated with IV micafungin and currently on fluconazole, to finish 4-week course. Chest tube  was placed and subsequently removed. [facility] ventilator. His respiratory status is now stable on room air.   * Most recently, there has been concern for right complicated parapneumonic effusion. S/p thoracentesis 11/26 and 1/13.  Right thoracentesis 1/13 consistent with exudative effusion with elevated LDH, high neutrophils, cultures show NGTD. CT on 1/18 showed small bilateral partially loculated pleural effusions with associated pleural enhancement consistent with infected effusions. Pulmonary consultants at [facility] have recommended transfer to [facility] for consideration of chest tube placement and possible intra-pleural lytics. Dr. Howe with thoracic surgery consulted prior to transfer.     - CT chest ordered by Dr. Jackson yesterday, small effusions on imaging and no plans for a chest tube. Can consider a diagnostic thoracentesis however unclear utility. Patient had one at [facility] 01/13 which is exudative but cultures have been NGTD. Currently no s/s of worsening infection only elevated inflammatory markers. ID currently following, appreciate their input regarding need for thoracentesis and starting antibiotics.  - will complete 4 week treatment for candida today  - Continue mucomyst, albuterol and duonebs   - Tracheostomy care per RT     S/p Liver transplant 2016  Cirrhosis with ascites  Subacute bacterial peritonitis  Main manifestations of this are hepatic encephalopathy and ascites.  Hepatologist at [place] felt that most likely reason for the cirrhosis was metabolic syndrome or alcohol intake.  There was no evidence of rejection on biopsy and there was some evidence of regeneration. Paracentesis done on 12/22/2022 showed lactobacillus indicating SBP, treated with Unasyn and Augmentin, finished 2-week course 1/12/2023.  Requires large-volume paracentesis periodically for recurring ascites.    * Paracentesis 1/13/2023 yielded about 7500 cc. Cultures NGTD.    *  Repeat CT abdomen/pelvis on 1/18 concerning for SBP given peritoneal enhancement.  * Repeat labs 1/19:  procalcitonin 1.64 from 1.81.  Ammonia 32.      - US guided paracentesis today vs tomorrow. Pending plan on thoracentesis would prefer to coordinate. Clinically patient is stable without signs of SBP, no indication to start antibiotics  - Continue intermittent paracentesis as needed if develops symptomatic ascites.    - Further treatment for recurrent ascites with TIPS deferred to his Liver Transplant team and/or Hepatology, he has an appointment on 4/21/2023 with Hepatology, Dr. Simms  - Continue Tacrolimus 1 mg BID, obtain trough level tomorrow  - Continue Lactulose and Rifaximin     Acute encephalopathy  Patient AOx4 this morning but drowsy and lethargic. Suspect multifactorial related to cirrhosis and medications. He receive Ambien last night for sleep  - place lidocaine patch to limit narcotics  - stop ambien  - continue care for cirrhosis above  - delirium precautions    Diarrhea  Liquid stool requiring rectal tube. Patient transferred with this the rectal tube and unclear how long the liquid diarrhea has been going on. Foul smelling  - as we are concerned for increasing inflammation and signs of infection should rule out cdiff as etiology. Order placed for stool sample.     Paroxysmal atrial fibrillation  Remains in sinus rhythm.  On anticoagulation with apixaban indefinitely for stroke prophylaxis.    - apixaban on hold for possible procedures, restart when we are able  - Cardiac monitoring       ESRD: Now on iHD.  No return of renal function.  - MWF schedule   - Nephrology consulted      Acute anemia  Pt has required intermittent transfusions for on-going anemia.   -Anemia most likely secondary to critical illness/chronic disease, chronic renal disease  -Transfuse packed red blood cells to keep Hb> 7  -Monitor H&H.     History PEA arrest   PEA arrest prior to his previous admission and 1 episode of  PEA associated with desaturation on 12/20.  No structural cardiac disease.   - Continue Cardiac Monitoring     Hypotension  Also has developed baseline hypotension 2/2 cirrhosis and prolonged critical illness.   -Continue current midodrine dose       Seizure after hypoxic event.  This is in the context of baseline multifactorial encephalopathy secondary to his ongoing critical illness and prior cardiac arrests and prior strokes and cirrhosis with hepatic encephalopathy. MRI of head of 12/20/22 reveals no PRES or leptomeningeal enhancement but scattered.  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   - Continue with  Keppra and Vimpat       Diabetes mellitus type 2  -Lantus decreased to 5 units daily on 1/19, hold this for now due to low PO intake  -MDSSI  -Hypoglycemic protocol in place     Moderate malnutrition, protein and calorie type.  -Continue with tube feeds via PEG tube, patient refusing to eat or have TF restarted. He reports ongoing nausea and distention.  - Nutritionist consulted and following   - paracentesis PRN       Diet: Soft & Bite Sized Diet (level 6) Liquidized/Moderately Thick (level 3)  Snacks/Supplements Adult: Gelatein Plus; With Meals  Snacks/Supplements Adult: Ensure Enlive; Between Meals  Room Service  Calorie Counts  Adult Formula Bolus Feeding: Novasource Renal; Route: Gastrostomy; 3 times daily; Volume per Bolus: 240; mL(s); 80 mL/hr x 3 hrs back up bolus feeding after each meal ONLY IF INTAKE <50% of meal    DVT Prophylaxis: DOAC  Nixon Catheter: Not present  Lines: PRESENT      PICC 11/24/22 Triple Lumen Right Brachial vein medial Access-Site Assessment: WDL  CVC Double Lumen Right External jugular Tunneled-Site Assessment: WDL      Cardiac Monitoring: ACTIVE order. Indication: Tachyarrhythmias, acute (48 hours)  Code Status: Full Code      Clinically Significant Risk Factors           # Hypercalcemia: corrected calcium is >10.1, will  monitor as appropriate    # Hypoalbuminemia: Lowest albumin = 1.9 g/dL at 1/23/2023  6:38 AM, will monitor as appropriate             # Severe Malnutrition: based on nutrition assessment, PRESENT ON ADMISSION       Disposition Plan      Expected Discharge Date: 01/26/2023                  Meredith Waldrop DO  Hospitalist Service  [facility]  Securely message with [employer] (more info)  Text page via [employer] Paging/Directory   ______________________________________________________________________    Interval History   Patient evaluated at beside. He is drowsy and falls asleep. He has some back pain but otherwise appears comfortable. No cough. NO fever or chills. NO events over the nighht    Physical Exam   Vital Signs: Temp: 97.8  F (36.6  C) Temp src: Axillary BP: 106/68 Pulse: 95   Resp: 26 SpO2: 93 % O2 Device: None (Room air)    Weight: 194 lbs 7.13 oz    Constitutional: Awake, alert, cooperative, no apparent distress, ill cachetic, jaundiced  HEENT: Trach in place but capped, bruising noted to neck  Respiratory: Clear to auscultation bilaterally, trace crackles in bases  Cardiovascular:  No obvious edema, no murmur  GI: Normal bowel sounds, soft, non-tender  Skin/Integumen: No rashes, no cyanosis, scattered abrasions and bruising from lab draws and frail skin    Medical Decision Making       65 MINUTES SPENT BY ME on the date of service doing chart review, history, exam, documentation & further activities per the note.      Data     I have personally reviewed the following data over the past 24 hrs:    7.6  \   7.6 (L)   / 207     136 94 (L) 43.7 (H) /  98   4.0 30 (H) 3.48 (H) \       ALT: N/A AST: N/A AP: N/A TBILI: N/A   ALB: 1.9 (L) TOT PROTEIN: N/A LIPASE: N/A       Imaging results reviewed over the past 24 hrs:   No results found for this or any previous visit (from the past 24 hour(s)).

## 2023-01-23 NOTE — PROGRESS NOTES
Renal Medicine Progress Note            Assessment/Plan:       1) ESKD:  ATN on top of CKD without recovery.  He has been on chronic HD MWF via R internal jugular CVC.  He runs for 3 H with removal of 1-2 KG as BP allows. BP supported with midodrine.  .       2) Loculated Pleural effusions.  Snall on R and minimal on L.       3) Anemia:  HGB 8.4.  He has been on EPO 40K weekly.       4) Chronic Hypotension: BP supported with midodrine.       5) MBD:  Phos 3.9.  Will check PTH and Vit D    Plan:  # 3 hrs HD, UF ~ 1 liter net. Na 140, bicarb 35, 3K. Qb 400 ml/min vi R TDC.  # Epo 13K units        Interval History:     Blanco is getting HD tx.   Pt requested UF to be stopped.  He says he feel sick.   He agreed to turning UF back on for net UF of 1 liter          Medications and Allergies:       - MEDICATION INSTRUCTIONS for Dialysis Patients -   Does not apply See Admin Instructions     sodium chloride 0.9%  250 mL Intravenous Once in dialysis/CRRT     sodium chloride 0.9%  300 mL Hemodialysis Machine Once     acetylcysteine  2 mL Nebulization BID     [Held by provider] apixaban ANTICOAGULANT  5 mg Oral BID     B and C vitamin Complex with folic acid  5 mL Oral or Feeding Tube Daily     epoetin mirta-epbx  13,000 Units Intravenous Once     folic acid  1 mg Oral or Feeding Tube Daily     sodium chloride (PF) 0.9%  10 mL Intracatheter Once in dialysis/CRRT    Followed by     heparin  1.3-2.6 mL Intracatheter Once in dialysis/CRRT     sodium chloride (PF) 0.9%  10 mL Intracatheter Once in dialysis/CRRT    Followed by     heparin  1.3-2.6 mL Intracatheter Once in dialysis/CRRT     insulin aspart  1-7 Units Subcutaneous TID AC     insulin aspart  1-5 Units Subcutaneous At Bedtime     [Held by provider] insulin glargine  5 Units Subcutaneous QAM AC     ipratropium - albuterol 0.5 mg/2.5 mg/3 mL  3 mL Nebulization 4x daily     lacosamide  100 mg Oral or Feeding Tube BID     lactulose  20 g Oral BID     levETIRAcetam   1,000 mg Oral or Feeding Tube Q24H     lidocaine  1 patch Transdermal Q24H     lidocaine   Transdermal Q8H BIJU     midodrine  5 mg Oral or Feeding Tube TID w/meals     mineral oil-hydrophilic petrolatum   Topical Daily     - MEDICATION INSTRUCTIONS -   Does not apply Once     nystatin  500,000 Units Swish & Spit 4x Daily     pantoprazole  40 mg Oral or Feeding Tube BID AC     rifaximin  550 mg Oral or Feeding Tube BID     sodium chloride (PF)  10-30 mL Intracatheter Q8H     sodium chloride (PF)  3 mL Intracatheter Q8H     sodium chloride (PF)  9 mL Intracatheter During Dialysis/CRRT (from stock)     sodium chloride (PF)  9 mL Intracatheter During Dialysis/CRRT (from stock)     tacrolimus  1 mg Oral BID IS      No Known Allergies         Physical Exam:   Vitals were reviewed   , Blood pressure 103/65, pulse 99, temperature 98.1  F (36.7  C), temperature source Oral, resp. rate 11, weight 88.2 kg (194 lb 7.1 oz), SpO2 98 %.    Wt Readings from Last 3 Encounters:   01/23/23 88.2 kg (194 lb 7.1 oz)   01/21/23 82.4 kg (181 lb 11.2 oz)   12/28/22 96 kg (211 lb 10.3 oz)       Intake/Output Summary (Last 24 hours) at 1/23/2023 1344  Last data filed at 1/23/2023 1100  Gross per 24 hour   Intake 860 ml   Output 400 ml   Net 460 ml       GENERAL APPEARANCE: Frail. Looks tired.   HEENT:  Trach is capped.   RESP: Good airflow. No wheezes.  CV: RRR, nl S1/S2, tachy.  ABDOMEN: Soft, NT  EXTREMITIES/SKIN: No edema         Data:     CBC RESULTS:     Recent Labs   Lab 01/23/23  0638 01/22/23  0534 01/19/23  1034 01/19/23  0631 01/19/23  0627 01/17/23  0952   WBC 7.6 7.0  --   --  7.2  --    RBC 2.63* 2.88*  --   --  2.00*  --    HGB 7.6* 8.4* 8.1* 7.7* 5.9* 8.1*   HCT 25.8* 28.6*  --   --  19.8*  --     231  --   --  238  --        Basic Metabolic Panel:  Recent Labs   Lab 01/23/23  1250 01/23/23  0743 01/23/23  0638 01/23/23  0207 01/22/23  2208 01/22/23  1407 01/22/23  1147 01/22/23  0528 01/19/23  2328 01/19/23  0631  01/19/23  0627 01/17/23  1903 01/17/23  0952   NA  --   --  136  --   --  135*  --  136  --  135* 134*  --  136   POTASSIUM  --   --  4.0  --   --  4.0  --  3.8  --  3.6 3.7  --  3.6   CHLORIDE  --   --  94*  --   --  96*  --  94*  --   --  96*  --   --    CO2  --   --  30*  --   --  26  --  29  --   --  28  --   --    BUN  --   --  43.7*  --   --  35.0*  --  33.6*  --   --  26.3*  --   --    CR  --   --  3.48*  --   --  2.92*  --  2.68*  --   --  2.21*  --   --    * 98 93 99 124* 119*   < > 90   < > 71 73   < > 104   KELLY  --   --  9.2  --   --  9.2  --  9.1  --   --  8.9  --   --     < > = values in this interval not displayed.       INRNo lab results found in last 7 days.   Attestation:   I have reviewed today's relevant vital signs, notes, medications, labs and imaging.    Zenon Bradshaw MD  [facility] - Nephrology  Office phone :[phone]  Pager: [phone]

## 2023-01-24 ENCOUNTER — APPOINTMENT (OUTPATIENT)
Dept: ULTRASOUND IMAGING | Facility: CLINIC | Age: 58
DRG: 981 | End: 2023-01-24
Attending: INTERNAL MEDICINE
Payer: COMMERCIAL

## 2023-01-24 LAB
ABSOLUTE NEUTROPHILS, BODY FLUID: 4.6 /UL
ALBUMIN BODY FLUID SOURCE: NORMAL
ALBUMIN FLD-MCNC: 0.6 G/DL
ANION GAP SERPL CALCULATED.3IONS-SCNC: 7 MMOL/L (ref 7–15)
APPEARANCE FLD: ABNORMAL
ATRIAL RATE - MUSE: 101 BPM
BASOPHILS # BLD AUTO: 0.1 10E3/UL (ref 0–0.2)
BASOPHILS NFR BLD AUTO: 1 %
BASOPHILS NFR FLD MANUAL: 1 %
BUN SERPL-MCNC: 27 MG/DL (ref 6–20)
CALCIUM SERPL-MCNC: 8.8 MG/DL (ref 8.6–10)
CELL COUNT BODY FLUID SOURCE: ABNORMAL
CHLORIDE SERPL-SCNC: 99 MMOL/L (ref 98–107)
COLOR FLD: YELLOW
CREAT SERPL-MCNC: 2.49 MG/DL (ref 0.67–1.17)
DEPRECATED HCO3 PLAS-SCNC: 31 MMOL/L (ref 22–29)
DIASTOLIC BLOOD PRESSURE - MUSE: NORMAL MMHG
EOSINOPHIL # BLD AUTO: 0.2 10E3/UL (ref 0–0.7)
EOSINOPHIL NFR BLD AUTO: 2 %
EOSINOPHIL NFR FLD MANUAL: 1 %
ERYTHROCYTE [DISTWIDTH] IN BLOOD BY AUTOMATED COUNT: 18.6 % (ref 10–15)
GFR SERPL CREATININE-BSD FRML MDRD: 29 ML/MIN/1.73M2
GLUCOSE BLDC GLUCOMTR-MCNC: 110 MG/DL (ref 70–99)
GLUCOSE BLDC GLUCOMTR-MCNC: 112 MG/DL (ref 70–99)
GLUCOSE BLDC GLUCOMTR-MCNC: 125 MG/DL (ref 70–99)
GLUCOSE BLDC GLUCOMTR-MCNC: 94 MG/DL (ref 70–99)
GLUCOSE SERPL-MCNC: 92 MG/DL (ref 70–99)
HCT VFR BLD AUTO: 27 % (ref 40–53)
HGB BLD-MCNC: 7.9 G/DL (ref 13.3–17.7)
IMM GRANULOCYTES # BLD: 0.2 10E3/UL
IMM GRANULOCYTES NFR BLD: 3 %
INTERPRETATION ECG - MUSE: NORMAL
LYMPHOCYTES # BLD AUTO: 2 10E3/UL (ref 0.8–5.3)
LYMPHOCYTES NFR BLD AUTO: 21 %
LYMPHOCYTES NFR FLD MANUAL: 61 %
MCH RBC QN AUTO: 28.7 PG (ref 26.5–33)
MCHC RBC AUTO-ENTMCNC: 29.3 G/DL (ref 31.5–36.5)
MCV RBC AUTO: 98 FL (ref 78–100)
MONOCYTES # BLD AUTO: 1.7 10E3/UL (ref 0–1.3)
MONOCYTES NFR BLD AUTO: 18 %
MONOS+MACROS NFR FLD MANUAL: 36 %
NEUTROPHILS # BLD AUTO: 5.4 10E3/UL (ref 1.6–8.3)
NEUTROPHILS NFR BLD AUTO: 55 %
NEUTS BAND NFR FLD MANUAL: 1 %
NRBC # BLD AUTO: 0 10E3/UL
NRBC BLD AUTO-RTO: 0 /100
P AXIS - MUSE: 46 DEGREES
PLATELET # BLD AUTO: 209 10E3/UL (ref 150–450)
POTASSIUM SERPL-SCNC: 3.9 MMOL/L (ref 3.4–5.3)
PR INTERVAL - MUSE: 178 MS
PROT FLD-MCNC: 1.8 G/DL
PROTEIN BODY FLUID SOURCE: NORMAL
QRS DURATION - MUSE: 74 MS
QT - MUSE: 362 MS
QTC - MUSE: 469 MS
R AXIS - MUSE: 39 DEGREES
RBC # BLD AUTO: 2.75 10E6/UL (ref 4.4–5.9)
SARS-COV-2 RNA RESP QL NAA+PROBE: NEGATIVE
SODIUM SERPL-SCNC: 137 MMOL/L (ref 136–145)
SYSTOLIC BLOOD PRESSURE - MUSE: NORMAL MMHG
T AXIS - MUSE: 38 DEGREES
TACROLIMUS BLD-MCNC: 4.8 UG/L (ref 5–15)
TME LAST DOSE: ABNORMAL H
TME LAST DOSE: ABNORMAL H
VENTRICULAR RATE- MUSE: 101 BPM
WBC # BLD AUTO: 9.5 10E3/UL (ref 4–11)
WBC # FLD AUTO: 458 /UL

## 2023-01-24 PROCEDURE — 89050 BODY FLUID CELL COUNT: CPT | Performed by: INTERNAL MEDICINE

## 2023-01-24 PROCEDURE — 87075 CULTR BACTERIA EXCEPT BLOOD: CPT | Performed by: FAMILY MEDICINE

## 2023-01-24 PROCEDURE — 250N000013 HC RX MED GY IP 250 OP 250 PS 637: Performed by: INTERNAL MEDICINE

## 2023-01-24 PROCEDURE — 99233 SBSQ HOSP IP/OBS HIGH 50: CPT | Performed by: INTERNAL MEDICINE

## 2023-01-24 PROCEDURE — 87205 SMEAR GRAM STAIN: CPT | Performed by: FAMILY MEDICINE

## 2023-01-24 PROCEDURE — 250N000013 HC RX MED GY IP 250 OP 250 PS 637: Performed by: STUDENT IN AN ORGANIZED HEALTH CARE EDUCATION/TRAINING PROGRAM

## 2023-01-24 PROCEDURE — 99232 SBSQ HOSP IP/OBS MODERATE 35: CPT | Performed by: INTERNAL MEDICINE

## 2023-01-24 PROCEDURE — 84157 ASSAY OF PROTEIN OTHER: CPT | Performed by: STUDENT IN AN ORGANIZED HEALTH CARE EDUCATION/TRAINING PROGRAM

## 2023-01-24 PROCEDURE — 250N000009 HC RX 250: Performed by: STUDENT IN AN ORGANIZED HEALTH CARE EDUCATION/TRAINING PROGRAM

## 2023-01-24 PROCEDURE — 85025 COMPLETE CBC W/AUTO DIFF WBC: CPT | Performed by: STUDENT IN AN ORGANIZED HEALTH CARE EDUCATION/TRAINING PROGRAM

## 2023-01-24 PROCEDURE — 250N000012 HC RX MED GY IP 250 OP 636 PS 637: Performed by: STUDENT IN AN ORGANIZED HEALTH CARE EDUCATION/TRAINING PROGRAM

## 2023-01-24 PROCEDURE — 94640 AIRWAY INHALATION TREATMENT: CPT

## 2023-01-24 PROCEDURE — 250N000011 HC RX IP 250 OP 636: Performed by: STUDENT IN AN ORGANIZED HEALTH CARE EDUCATION/TRAINING PROGRAM

## 2023-01-24 PROCEDURE — 89050 BODY FLUID CELL COUNT: CPT | Performed by: STUDENT IN AN ORGANIZED HEALTH CARE EDUCATION/TRAINING PROGRAM

## 2023-01-24 PROCEDURE — 82042 OTHER SOURCE ALBUMIN QUAN EA: CPT | Performed by: STUDENT IN AN ORGANIZED HEALTH CARE EDUCATION/TRAINING PROGRAM

## 2023-01-24 PROCEDURE — 80197 ASSAY OF TACROLIMUS: CPT

## 2023-01-24 PROCEDURE — 999N000157 HC STATISTIC RCP TIME EA 10 MIN

## 2023-01-24 PROCEDURE — 272N000706 US PARACENTESIS WITHOUT ALBUMIN

## 2023-01-24 PROCEDURE — 120N000001 HC R&B MED SURG/OB

## 2023-01-24 PROCEDURE — U0005 INFEC AGEN DETEC AMPLI PROBE: HCPCS | Performed by: INTERNAL MEDICINE

## 2023-01-24 PROCEDURE — 80048 BASIC METABOLIC PNL TOTAL CA: CPT | Performed by: STUDENT IN AN ORGANIZED HEALTH CARE EDUCATION/TRAINING PROGRAM

## 2023-01-24 RX ORDER — TRAZODONE HYDROCHLORIDE 50 MG/1
50 TABLET, FILM COATED ORAL AT BEDTIME
Status: DISCONTINUED | OUTPATIENT
Start: 2023-01-24 | End: 2023-01-29

## 2023-01-24 RX ORDER — HYDROXYZINE HCL 10 MG/5 ML
25-50 SOLUTION, ORAL ORAL
Status: DISCONTINUED | OUTPATIENT
Start: 2023-01-24 | End: 2023-01-26

## 2023-01-24 RX ORDER — SIMETHICONE 80 MG
80 TABLET,CHEWABLE ORAL EVERY 6 HOURS PRN
Status: DISCONTINUED | OUTPATIENT
Start: 2023-01-24 | End: 2023-04-28 | Stop reason: HOSPADM

## 2023-01-24 RX ORDER — LIDOCAINE HYDROCHLORIDE 10 MG/ML
10 INJECTION, SOLUTION EPIDURAL; INFILTRATION; INTRACAUDAL; PERINEURAL ONCE
Status: COMPLETED | OUTPATIENT
Start: 2023-01-24 | End: 2023-01-25

## 2023-01-24 RX ADMIN — NYSTATIN 500000 UNITS: 100000 SUSPENSION ORAL at 12:07

## 2023-01-24 RX ADMIN — TACROLIMUS 1 MG: 5 CAPSULE ORAL at 17:45

## 2023-01-24 RX ADMIN — TACROLIMUS 1 MG: 5 CAPSULE ORAL at 08:50

## 2023-01-24 RX ADMIN — Medication 5 ML: at 08:49

## 2023-01-24 RX ADMIN — Medication 40 MG: at 09:05

## 2023-01-24 RX ADMIN — NYSTATIN 500000 UNITS: 100000 SUSPENSION ORAL at 08:30

## 2023-01-24 RX ADMIN — LACTULOSE 20 G: 10 SOLUTION ORAL at 08:30

## 2023-01-24 RX ADMIN — NYSTATIN 500000 UNITS: 100000 SUSPENSION ORAL at 17:34

## 2023-01-24 RX ADMIN — RIFAXIMIN 550 MG: 550 TABLET ORAL at 22:06

## 2023-01-24 RX ADMIN — WHITE PETROLATUM: 1.75 OINTMENT TOPICAL at 08:53

## 2023-01-24 RX ADMIN — RIFAXIMIN 550 MG: 550 TABLET ORAL at 08:31

## 2023-01-24 RX ADMIN — TRAZODONE HYDROCHLORIDE 25 MG: 50 TABLET ORAL at 22:06

## 2023-01-24 RX ADMIN — LIDOCAINE 1 PATCH: 560 PATCH PERCUTANEOUS; TOPICAL; TRANSDERMAL at 08:30

## 2023-01-24 RX ADMIN — ACETYLCYSTEINE 2 ML: 200 SOLUTION ORAL; RESPIRATORY (INHALATION) at 19:32

## 2023-01-24 RX ADMIN — LEVETIRACETAM 1000 MG: 100 SOLUTION ORAL at 22:06

## 2023-01-24 RX ADMIN — MIDODRINE HYDROCHLORIDE 5 MG: 5 TABLET ORAL at 08:31

## 2023-01-24 RX ADMIN — LACTULOSE 20 G: 10 SOLUTION ORAL at 22:06

## 2023-01-24 RX ADMIN — LACOSAMIDE 100 MG: 10 SOLUTION ORAL at 09:32

## 2023-01-24 RX ADMIN — LACOSAMIDE 100 MG: 10 SOLUTION ORAL at 22:05

## 2023-01-24 RX ADMIN — NYSTATIN 500000 UNITS: 100000 SUSPENSION ORAL at 22:06

## 2023-01-24 RX ADMIN — MIDODRINE HYDROCHLORIDE 5 MG: 5 TABLET ORAL at 17:33

## 2023-01-24 RX ADMIN — MIDODRINE HYDROCHLORIDE 5 MG: 5 TABLET ORAL at 11:26

## 2023-01-24 RX ADMIN — FOLIC ACID 1 MG: 5 INJECTION, SOLUTION INTRAMUSCULAR; INTRAVENOUS; SUBCUTANEOUS at 08:49

## 2023-01-24 RX ADMIN — Medication 40 MG: at 17:34

## 2023-01-24 RX ADMIN — ACETAMINOPHEN 650 MG: 325 TABLET, FILM COATED ORAL at 01:32

## 2023-01-24 RX ADMIN — OXYCODONE HYDROCHLORIDE 5 MG: 5 SOLUTION ORAL at 12:07

## 2023-01-24 RX ADMIN — IPRATROPIUM BROMIDE AND ALBUTEROL SULFATE 3 ML: 2.5; .5 SOLUTION RESPIRATORY (INHALATION) at 19:32

## 2023-01-24 ASSESSMENT — ACTIVITIES OF DAILY LIVING (ADL)
ADLS_ACUITY_SCORE: 64

## 2023-01-24 NOTE — PLAN OF CARE
Goal Outcome Evaluation:  Disoriented to place & situations- easily reoriented. VSS ex tachy HR. Tele ST. Abd distended, firm, tender to touch at times. Rectal tube in place with loose yellow stool output. Pain managed with prn tylenol. Denies N/V. Skin fragile with multiple wounds/scattered bruises. Gentle turn/repositioned every two hours with 2A/lift. Pt voicing needs overnight, denies hopelessness, encouraged to voice needs, offered support. Plans for discharge pending clinical improvement.

## 2023-01-24 NOTE — PROGRESS NOTES
AGUILAR FROM [address] [place] [address] Dr THAT MEDICATION SHOULD BE DOUBLE DUE TO PATIENT WEIGHT .  PLEASE GIVE HIM A CALL BACK AT   [phone] -  TAMIFLU Take 5 mLs by mouth 2 times daily for 5 days - Oral [facility]    Infectious Disease Progress Note    Date of Service (when I saw the patient): 01/24/2023     Assessment & Plan   Blanco Osborne is a 57 year old male who was admitted on 1/21/2023.     ASSESSMENT:  1. SMALL LOCULATED R PLEURAL EFFUSION-CXS negative, had recent pneumococcal pneumonia 11/22; prior cx + C.parapsilosis in broth  2. LACTOBACILLUS, CANDIDA PERITONITIS-RX unasyn; transitioned to oral Augmentin for 4 weeks.  Augmentin completed 1/11/2023. On 4 week flucon course to 1/23  3. HEPATIC ENCEPHALOPATHY  4. ETOH CIRRHOSIS, S/P LIVER TRANSPLANT, ongoing cirrhosis  5. MALNUTRITION  6. RECENT PNEUMOCOCCAL SEPSIS, ARDS, MOF,   7. H/O PEA ARREST  8. LIVER TRANSPLANT 2016  9. R PNEUMOTHORAX-resolved  9. ESRD, on HD  10. SEIZURES        RECOMMENDATION  1. Seen by Dr. Jackson  and no plan for thoracocentesis   2. Finished the course of most recent antimicrobial which was fluc.   3. Normal WBC, no fever   4. Continue to watch off antibiotics     Will follow peripherally please call if changes in status or ques     Gurwinder Lanza MD    Interval History   Tolerating antibiotics ok   No new rashes or issues with antibiotics   Labs reviewed   No changes to past medical, social or family history   Afebrile   A 10 point ROS was done and is negative other than noted in the interval history above     Physical Exam   Temp: 97.6  F (36.4  C) Temp src: Oral BP: 110/74 Pulse: 103   Resp: 18 SpO2: 91 % O2 Device: None (Room air)    Vitals:    01/23/23 0051   Weight: 88.2 kg (194 lb 7.1 oz)     Vital Signs with Ranges  Temp:  [97.6  F (36.4  C)-98.5  F (36.9  C)] 97.6  F (36.4  C)  Pulse:  [] 103  Resp:  [11-41] 18  BP: ()/(64-95) 110/74  SpO2:  [91 %-100 %] 91 %    Constitutional: Awake, alert, cooperative, no apparent distress  Lungs: Clear to auscultation bilaterally, no crackles or wheezing  Cardiovascular: Regular rate and rhythm, normal S1 and S2, and no murmur noted  Abdomen:  distended and tight   Skin: No rashes, no cyanosis, no edema  Other:    Medications       - MEDICATION INSTRUCTIONS for Dialysis Patients -   Does not apply See Admin Instructions     acetylcysteine  2 mL Nebulization BID     [Held by provider] apixaban ANTICOAGULANT  5 mg Oral BID     B and C vitamin Complex with folic acid  5 mL Oral or Feeding Tube Daily     folic acid  1 mg Intravenous Daily     insulin aspart  1-7 Units Subcutaneous TID AC     insulin aspart  1-5 Units Subcutaneous At Bedtime     [Held by provider] insulin glargine  5 Units Subcutaneous QAM AC     ipratropium - albuterol 0.5 mg/2.5 mg/3 mL  3 mL Nebulization 4x daily     lacosamide  100 mg Oral or Feeding Tube BID     lactulose  20 g Oral BID     levETIRAcetam  1,000 mg Oral or Feeding Tube Q24H     lidocaine  1 patch Transdermal Q24H     lidocaine   Transdermal Q8H BIJU     midodrine  5 mg Oral or Feeding Tube TID w/meals     mineral oil-hydrophilic petrolatum   Topical Daily     nystatin  500,000 Units Swish & Spit 4x Daily     pantoprazole  40 mg Oral or Feeding Tube BID AC     rifaximin  550 mg Oral or Feeding Tube BID     sodium chloride (PF)  10-30 mL Intracatheter Q8H     sodium chloride (PF)  3 mL Intracatheter Q8H     tacrolimus  1 mg Oral BID IS       Data   All microbiology laboratory data reviewed.  Recent Labs   Lab Test 01/24/23  0715 01/23/23  0638 01/22/23  0534   WBC 9.5 7.6 7.0   HGB 7.9* 7.6* 8.4*   HCT 27.0* 25.8* 28.6*   MCV 98 98 99    207 231     Recent Labs   Lab Test 01/24/23  0715 01/23/23  0638 01/22/23  1407   CR 2.49* 3.48* 2.92*     No lab results found.  Recent Labs   Lab Test 09/28/16  1600 09/23/16  1458 09/20/16  1901   CULT No VRE isolated No growth Culture negative for acid fast bacilli  Assayed at [facility] [address]    Culture negative after 4 weeks  No anaerobes isolated  No growth       All cultures:  No results for input(s): CULTURE in the last 168 hours.   Blood  culture:  Results for orders placed or performed during the hospital encounter of 12/29/22   Blood Culture Hand, Right    Specimen: Hand, Right; Blood   Result Value Ref Range    Culture No Growth    Blood Culture Hand, Left    Specimen: Hand, Left; Blood   Result Value Ref Range    Culture No Growth    Blood Culture Peripheral Blood    Specimen: Peripheral Blood   Result Value Ref Range    Culture No Growth    Blood Culture Arm, Left    Specimen: Arm, Left; Blood   Result Value Ref Range    Culture No Growth    Results for orders placed or performed during the hospital encounter of 12/16/22   Blood Culture Peripheral Blood    Specimen: Peripheral Blood   Result Value Ref Range    Culture No Growth    Blood Culture Arm, Right    Specimen: Arm, Right; Blood   Result Value Ref Range    Culture No Growth    Results for orders placed or performed during the hospital encounter of 12/16/22   Blood Culture Peripheral Blood    Specimen: Peripheral Blood   Result Value Ref Range    Culture No Growth    Results for orders placed or performed during the hospital encounter of 11/12/22   Blood Culture Line, venous    Specimen: Line, venous; Blood   Result Value Ref Range    Culture No Growth    Blood Culture Hand, Right    Specimen: Hand, Right; Blood   Result Value Ref Range    Culture No Growth    Blood Culture Hand, Right    Specimen: Hand, Right; Blood   Result Value Ref Range    Culture No Growth    Blood Culture Hand, Left    Specimen: Hand, Left; Blood   Result Value Ref Range    Culture No Growth    Blood Culture Peripheral Blood    Specimen: Peripheral Blood   Result Value Ref Range    Culture No Growth    Blood Culture Peripheral Blood    Specimen: Peripheral Blood   Result Value Ref Range    Culture Positive on the 1st day of incubation (A)     Culture Streptococcus pneumoniae (AA)        Susceptibility    Streptococcus pneumoniae - KARAN     Levofloxacin 1.5 Susceptible ug/mL     Meropenem 0.012 Susceptible ug/mL      Vancomycin 0.50 Susceptible ug/mL     Penicillin (meningitis) 0.016 Susceptible ug/mL     Penicillin (non-meningitis) 0.016 Susceptible ug/mL     Penicillin(oral) 0.016 Susceptible ug/mL     Ceftriaxone (non-meningitis) 0.016 Susceptible ug/mL     Ceftriaxone (meningitis) 0.016 Susceptible ug/mL      Urine culture:  Results for orders placed or performed during the hospital encounter of 09/20/16   Urine Culture Aerobic Bacterial   Result Value Ref Range    Specimen Description Catheterized Urine     Culture Micro No growth     Micro Report Status FINAL 09/25/2016

## 2023-01-24 NOTE — PLAN OF CARE
[id number]  Paracentesis done today 4.8L removed    Orientations: 4 but more lethargic in evening  Vitals/Pain: VSS on RA  Tele: SR  Lines/Drains: triple lumen PICC to RUE, CVC to R chest, PEG tube in place  Diet:  Soft and bite sized diet - thickened liquids -meds through peg tube  Bolus feedings if <50% of meals consumed - refused lunch bolus but dinner bolus given  Skin/Wounds: DTI to sacrum/coccyx - new mepilex in place  GI/: Rectal tube in place with large output of tan/yellow BM, no UO  Ambulation/Assist: x2 w/ lift  Sleep Quality: poor

## 2023-01-24 NOTE — PLAN OF CARE
Summary: [id number] 1/23/23  Orientation: A/Ox4  Activity Level: Ax2 lift  Fall Risk: yes  Behavior & Aggression Tool Color: green- feelings of hopelessness  Pain Management: denies  ABNL VS/O2: VSS  ABNL Lab/BG:   Diet: soft bite size calorie count mod thick liquid  Bowel/Bladder: Dialysis so no urine, rectal tube in place  Drains/Devices: R PICC, R CVC, PEG  Tests/Procedures for next shift: n/a  Anticipated DC date: TBD  Other Important Info:

## 2023-01-24 NOTE — PLAN OF CARE
Goal Outcome Evaluation:    [id number]  Orientations: AOx2-3, can be disoriented to place and situation   Vitals/Pain: VSS, RA, denies pain, trach is capped  Tele: SR  Lines/Drains: PICC, RUE, PEG tube, CVC to R chest  Skin/Wounds: Wound on coccyx  GI/: Rectal tube in place, pt on hemodialysis  Ambulation/Assist: Assist of 2 w/lift  Plan: Had dialysis today, awaiting C.diff results, continue to monitor

## 2023-01-24 NOTE — CONSULTS
[facility] Nurse Inpatient Assessment     Consulted for: Coccyx    Patient History (according to provider note(s):      Blanco Osborne is a 57 year old male admitted on 1/21/2023 as a transfer from [facility] for evaluation of loculated pleural effusion. Patient has multiple medical problems including history of liver transplant 2016 due to alcohol abuse, pAF on chronic anticoagulation, history of DM2, CVA, HTN, CHRISTIAN on BiPAP. 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 Discharge to [facility], back to [facility] and back to [facility] during month of December.     Recently while at [facility] CT chest/abdomen/pelvis on 01/18 demonstrated concerns for infected parapneumonic effusions and SBP.  CT findings discussed with Pulmonology team with 3 different pulmonologist including (Dr. Monge, Dr. Chu and Dr. Jay) with agreement that patient needs chest tube and possible lytics and therefore transfer to higher level of care.     Areas Assessed:      Areas visualized during today's visit: Sacrum/coccyx    Wound location: coccyx    Last photo: NA  Wound due to: Pressure Injury   Superior coccyx and Left coccyx stage 2 and right coccyx DTPI  Wound history/plan of care: 3 very small wounds creating a triangle over coccyx with 2 open shallow stage 2 and 1 DTPI all present on admission  Wound base: Superior coccyx and L coccyx 100% superficial dermis and Right coccyx 100 % non-blanchable purple erythema      Palpation of the wound bed: normal      Drainage: scant     Description of drainage: serous     Measurements (length x width x depth, in cm): Superior coccyx and L coccyx  approx 0.5cm x 0.2cm x 0.01cm, R coccyx 0.4cm x 0.2cm x 0cm     Tunneling: N/A     Undermining: N/A  Periwound skin: Intact      Color: normal and consistent  "with surrounding tissue      Temperature: normal   Odor: none  Pain: pain noted with turning but not to wound area  Pain interventions prior to dressing change: patient tolerated well  Treatment goal: Heal  and Protection  STATUS: initial assessment  Supplies ordered: at bedside and supplies stored on unit       Treatment Plan:     Coccyx wound(s): Every 3 days   1. Clean wound with saline or MicroKlenz Spray, pat dry  2. Wipe / \"clean\" the surrounding periwound tissue with skin prep (Cavilon No Sting Skin Prep #[id number]) and allow to dry. This will help protect periwound and help dressing adherence  3. Press a Mepilex  Sacral Dressing (PS#[id number])    to the area, making sure to conform nicely to skin curvatures.   4. Time and date dressing change  NOTE  -Reposition pt side to side preston when in bed, every 2 hours-get the pt way over on side to completely offload pressure. This will benefit skin and respiratory function   -Keep heels elevated and floating on pillows at all times. Try using at least 2 pillows under each calf.  -When up to the chair pt needs to fully offload every 2 hours and use a chair cushion if needed          Pressure Injury Prevention (PIP) Plan:  If patient is declining pressure injury prevention interventions: Explore reason why and address patient's concerns, Educate on pressure injury risk and prevention intervention(s), If patient is still declining, document \"informed refusal\"  and Ensure Care team is aware ( provider, charge nurse, etc)  Mattress: Follow bed algorithm, reassess daily and order specialty mattress, if indicated.  HOB: Maintain at or below 30 degrees, unless contraindicated  Repositioning in bed: Every 1-2 hours , Left/right positioning; avoid supine and Raise foot of bed prior to raising head of bed, to reduce patient sliding down (shear)  Heels: Keep elevated off mattress, Pillows under calves and Heel lift boots  Protective Dressing: Sacral Mepilex for prevention (#801135),  " especially for the agitated patient   Positioning Equipment: None  Chair positioning: Chair cushion (#[id number])  and Assist patient to reposition hourly   If patient has a buttock pressure injury, or high risk for PI use chair cushion or SPS.  Moisture Management: Perineal cleansing /protection: Follow Incontinence Protocol, Avoid brief in bed, Clean and dry skin folds with bathing , Consider InterDry (#[id number]) between folds and Moisturize dry skin  Under Devices: Inspect skin under all medical devices during skin inspection , Ensure tubes are stabilized without tension and Ensure patient is not lying on medical devices or equipment when repositioned  Ask provider to discontinue device when no longer needed.        Orders: Written    RECOMMEND PRIMARY TEAM ORDER: None, at this time  Education provided: importance of repositioning, plan of care, wound progress and Off-loading pressure  Discussed plan of care with: Patient and Nurse  WOC nurse follow-up plan: weekly  Notify WOC if wound(s) deteriorate.  Nursing to notify the Provider(s) and re-consult the WOC Nurse if new skin concern.    DATA:     Current support surface: Standard  Low air loss (MIQUEL pump, Isolibrium, Pulsate, skin guard, etc)  Containment of urine/stool: Internal fecal management  BMI: Body mass index is 26.37 kg/m .   Active diet order: Orders Placed This Encounter      Soft & Bite Sized Diet (level 6) Liquidized/Moderately Thick (level 3)     Output: I/O last 3 completed shifts:  In: 440 [P.O.:440]  Out: 700 [Other:500; Stool:200]     Labs: Recent Labs   Lab 01/24/23  0715 01/23/23  0638 01/22/23  0534 01/22/23  0528   ALBUMIN  --  1.9*  --  2.1*   HGB 7.9* 7.6*   < >  --    WBC 9.5 7.6   < >  --    CRP  --   --   --  138.42*    < > = values in this interval not displayed.     Pressure injury risk assessment:   Sensory Perception: 3-->slightly limited  Moisture: 3-->occasionally moist  Activity: 2-->chairfast  Mobility: 2-->very limited  Nutrition:  2-->probably inadequate  Friction and Shear: 1-->problem  Kareem Score: 13    Laith Rahman RN CWOCN  Vocera: [facility] Nurse [Jacobo]  Dept. Office Number: [phone]

## 2023-01-24 NOTE — PROGRESS NOTES
[facility]    Hospitalist Progress Note    Assessment & Plan   Blanco Osborne is a 57 year old male admitted on 1/21/2023 as a transfer from [facility] for evaluation of loculated pleural effusion. Patient has multiple medical problems including history of liver transplant 2016 due to alcohol abuse, pAF on chronic anticoagulation, history of DM2, CVA, HTN, CHRISTIAN on BiPAP. 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 Discharge to [facility], back to [facility] and back to [facility] during month of December.     Recently while at [facility] there were concerns for worsening effusions. He had thoracentesis 01/13 which returned exudative without growth on cultures. CT chest/abdomen/pelvis on 01/18 demonstrated worsening effusions and concerns for infected parapneumonic effusions with possible SBP.  CT findings discussed with Pulmonology team with 3 different pulmonologist including (Dr. Monge, Dr. Chu and Dr. Jay) with agreement that patient needs chest tube and possible lytics and therefore transfer to higher level of care.      See discharge summary 01/21/2023 for more details of his recent hx.      Bilateral pleural effusions, ? empyema  Acute now chronic respiratory failure requiring tracheostomy  - resolved    Pneumonia  - resolved   ARDS  - resolved    Right pneumothorax - resolved   Initial event was parainfluenza and strep pneumo pneumonia back in November 2022. Treated for ARDS. Had failed extubation x2 and tracheostomy performed last hospitalization.  Had additional complications of bilateral pneumothoraces as well as hydropneumothorax- pleural fluid grew candida parapsilosis. He was treated with IV micafungin and currently on fluconazole, to finish 4-week course. Chest tube was placed and subsequently removed. While in  LTACH he was successfully weaned from the ventilator. His respiratory status is now stable on room air.   * Most recently, there has been concern for right complicated parapneumonic effusion. S/p thoracentesis 11/26 and 1/13.  Right thoracentesis 1/13 consistent with exudative effusion with elevated LDH, high neutrophils, cultures show NGTD. CT on 1/18 showed small bilateral partially loculated pleural effusions with associated pleural enhancement consistent with infected effusions. Pulmonary consultants at [facility] have recommended transfer to [facility] for consideration of chest tube placement and possible intra-pleural lytics. Dr. Howe with thoracic surgery consulted prior to transfer.      - CT chest ordered by Dr. Jackson yesterday, small effusions on imaging and no plans for a chest tube. Can consider a diagnostic thoracentesis however unclear utility. Patient had one at [facility] 01/13 which is exudative but cultures have been NGTD. Currently no s/s of worsening infection only elevated inflammatory markers. ID currently following, appreciate their input regarding need for thoracentesis and starting antibiotics.  - will complete 4 week treatment for candida today  - Continue mucomyst, albuterol and duonebs   - Tracheostomy care per RT  -Discussed with social work team to look into [facility] for transfer, patient can be transferred as early as 1 to 2 days.     S/p Liver transplant 2016  Cirrhosis with ascites  Subacute bacterial peritonitis  Main manifestations of this are hepatic encephalopathy and ascites.  Hepatologist at [place] felt that most likely reason for the cirrhosis was metabolic syndrome or alcohol intake.  There was no evidence of rejection on biopsy and there was some evidence of regeneration. Paracentesis done on 12/22/2022 showed lactobacillus indicating SBP, treated with Unasyn and Augmentin, finished 2-week course 1/12/2023.  Requires large-volume paracentesis periodically for recurring  ascites.    * Paracentesis 1/13/2023 yielded about 7500 cc. Cultures NGTD.    * Repeat CT abdomen/pelvis on 1/18 concerning for SBP given peritoneal enhancement.  * Repeat labs 1/19:  procalcitonin 1.64 from 1.81.  Ammonia 32.   -Due to abdominal distention will order a therapeutic paracentesis, will also order cell counts on it to rule out SBP but the patient did not have any symptoms of SBP.  - Continue intermittent paracentesis as needed if develops symptomatic ascites.    - Further treatment for recurrent ascites with TIPS deferred to his Liver Transplant team and/or Hepatology, he has an appointment on 4/21/2023 with Hepatology, Dr. Simms  - Continue Tacrolimus 1 mg BID, obtain trough level tomorrow  - Continue Lactulose and Rifaximin      Acute encephalopathy  Patient AOx4 this morning but drowsy and lethargic. Suspect multifactorial related to cirrhosis and medications. He receive Ambien last night for sleep  - place lidocaine patch to limit narcotics  - stop ambien  - continue care for cirrhosis above  - delirium precautions     Diarrhea  Liquid stool requiring rectal tube. Patient transferred with this the rectal tube and unclear how long the liquid diarrhea has been going on. Foul smelling  - C. difficile negative, diarrhea present     Paroxysmal atrial fibrillation  Remains in sinus rhythm.  On anticoagulation with apixaban indefinitely for stroke prophylaxis.    - apixaban on hold for possible procedures, restart when we are able  - Cardiac monitoring       ESRD: Now on iHD.  No return of renal function.  - MWF schedule   - Nephrology consulted      Acute anemia  Pt has required intermittent transfusions for on-going anemia.   -Anemia most likely secondary to critical illness/chronic disease, chronic renal disease  -Transfuse packed red blood cells to keep Hb> 7  -Monitor H&H.     History PEA arrest   PEA arrest prior to his previous admission and 1 episode of PEA associated with desaturation on  12/20.  No structural cardiac disease.   - Continue Cardiac Monitoring     Hypotension  Also has developed baseline hypotension 2/2 cirrhosis and prolonged critical illness.   -Continue current midodrine dose       Seizure after hypoxic event.  This is in the context of baseline multifactorial encephalopathy secondary to his ongoing critical illness and prior cardiac arrests and prior strokes and cirrhosis with hepatic encephalopathy. MRI of head of 12/20/22 reveals no PRES or leptomeningeal enhancement but scattered.  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   - Continue with  Keppra and Vimpat       Diabetes mellitus type 2  -Lantus decreased to 5 units daily on 1/19, hold this for now due to low PO intake  -MDSSI  -Hypoglycemic protocol in place     Moderate malnutrition, protein and calorie type.  -Continue with tube feeds via PEG tube, patient refusing to eat or have TF restarted. He reports ongoing nausea and distention.  Calorie count has been initiated, discussed that with family and discussed that with patient in detail.  - Nutritionist consulted and following   -We will order paracentesis for relief of symptoms.      DVT Prophylaxis: DOAC's  Code Status: Full Code     55 MINUTES SPENT BY ME on the date of service doing chart review, history, exam, documentation & further activities per the note.  Disposition: Expected discharge in 1 to 2 days, they are requesting to go to [facility] and set up at this time.  Clinically Significant Risk Factors           # Hypercalcemia: corrected calcium is >10.1, will monitor as appropriate    # Hypoalbuminemia: Lowest albumin = 1.9 g/dL at 1/23/2023  6:38 AM, will monitor as appropriate            # Severe Malnutrition: based on nutrition assessment, PRESENT ON ADMISSION       Carol Garcia MD  Text Page   (7am to 6pm)    Interval History   And is refusing tube feedings for few days now, he mentions bloating and  fullness on his abdomen, abdomen is distended, he also has secretions in the trach site.  Spouse was near bedside, we had a long discussion about his nutrition.    -Data reviewed today: I reviewed all new labs and imaging results over the last 24 hours.    Physical Exam     Vital Signs with Ranges  Temp:  [97.6  F (36.4  C)-98.5  F (36.9  C)] 98.1  F (36.7  C)  Pulse:  [] 84  Resp:  [18-38] 20  BP: (105-122)/(64-80) 115/77  SpO2:  [91 %-100 %] 94 %  I/O last 3 completed shifts:  In: 440 [P.O.:440]  Out: 500 [Other:500]    Constitutional: Awake, alert, cooperative, no apparent distress  Respiratory: Clear to auscultation bilaterally, no crackles or wheezing  Cardiovascular: Regular rate and rhythm, normal S1 and S2, and no murmur noted  GI: Abdomen is distended, ascites present, G-tube site remains okay  Skin/Integumen: No rashes, no cyanosis, no edema  Neuro : moving all 4 extremities, no focal deficit noted     Medications       - MEDICATION INSTRUCTIONS for Dialysis Patients -   Does not apply See Admin Instructions     acetylcysteine  2 mL Nebulization BID     [Held by provider] apixaban ANTICOAGULANT  5 mg Oral BID     B and C vitamin Complex with folic acid  5 mL Oral or Feeding Tube Daily     folic acid  1 mg Intravenous Daily     insulin aspart  1-7 Units Subcutaneous TID AC     insulin aspart  1-5 Units Subcutaneous At Bedtime     [Held by provider] insulin glargine  5 Units Subcutaneous QAM AC     ipratropium - albuterol 0.5 mg/2.5 mg/3 mL  3 mL Nebulization 4x daily     lacosamide  100 mg Oral or Feeding Tube BID     lactulose  20 g Oral BID     levETIRAcetam  1,000 mg Oral or Feeding Tube Q24H     lidocaine  1 patch Transdermal Q24H     lidocaine   Transdermal Q8H BIJU     midodrine  5 mg Oral or Feeding Tube TID w/meals     mineral oil-hydrophilic petrolatum   Topical Daily     nystatin  500,000 Units Swish & Spit 4x Daily     pantoprazole  40 mg Oral or Feeding Tube BID AC     rifaximin  550 mg  Oral or Feeding Tube BID     sodium chloride (PF)  10-30 mL Intracatheter Q8H     sodium chloride (PF)  3 mL Intracatheter Q8H     tacrolimus  1 mg Oral BID IS       Data   Recent Labs   Lab 01/24/23  1129 01/24/23  0715 01/24/23  0221 01/23/23  0743 01/23/23  0638 01/22/23  2208 01/22/23  1407 01/22/23  1147 01/22/23  0534 01/22/23  0528 01/19/23  0631 01/19/23  0627   WBC  --  9.5  --   --  7.6  --   --   --  7.0  --   --  7.2   HGB  --  7.9*  --   --  7.6*  --   --   --  8.4*  --    < > 5.9*   MCV  --  98  --   --  98  --   --   --  99  --   --  99   PLT  --  209  --   --  207  --   --   --  231  --   --  238   NA  --  137  --   --  136  --  135*  --   --  136   < > 134*   POTASSIUM  --  3.9  --   --  4.0  --  4.0  --   --  3.8   < > 3.7   CHLORIDE  --  99  --   --  94*  --  96*  --   --  94*  --  96*   CO2  --  31*  --   --  30*  --  26  --   --  29  --  28   BUN  --  27.0*  --   --  43.7*  --  35.0*  --   --  33.6*  --  26.3*   CR  --  2.49*  --   --  3.48*  --  2.92*  --   --  2.68*  --  2.21*   ANIONGAP  --  7  --   --  12  --  13  --   --  13  --  10   KELLY  --  8.8  --   --  9.2  --  9.2  --   --  9.1  --  8.9   GLC 94 92 110*   < > 93   < > 119*   < >  --  90   < > 73   ALBUMIN  --   --   --   --  1.9*  --   --   --   --  2.1*   < > 1.9*   PROTTOTAL  --   --   --   --   --   --   --   --   --  6.4  --  6.2*   BILITOTAL  --   --   --   --   --   --   --   --   --  0.5  --  0.5   ALKPHOS  --   --   --   --   --   --   --   --   --  260*  --  236*   ALT  --   --   --   --   --   --   --   --   --  9*  --  8*   AST  --   --   --   --   --   --   --   --   --  24  --  20    < > = values in this interval not displayed.     Recent Labs   Lab 01/24/23  1129 01/24/23  0715 01/24/23  0221 01/23/23  2127 01/23/23  1746   GLC 94 92 110* 105* 97       Imaging:   No results found for this or any previous visit (from the past 24 hour(s)).

## 2023-01-24 NOTE — PROGRESS NOTES
CALORIE COUNT      Approximate Oral Intake for:    1/23/23  Calories:  186 kcal   Protein:  10 grams       Intake from TF/PN:     No intake from TF documented yesterday (noted that he refused TF 1/22 d/t nausea but no mention of refusal yesterday, however no record of TF infusion yesterday on flowsheets so suspecting he didn't receive it)    TF orders in [facility] renal at 80 mL/hr x 3 hrs TID as back up bolus after meals - IF CONSUMES <50% OF MEAL   This is equivalent to 240 mL formula TID or 720 mL/day = 1440 kcal, 66 g protein, 132 g CHO, 0 g fiber and 516 mL free water (meeting approx 58% estimated energy needs and 66% estimated protein needs from TF alone)       Estimated Needs:    Dosing Weight 82.4 kg   Estimated Energy Needs: 0222-6544 kcals (30-35 Kcal/Kg)  Justification: repletion and HD  Estimated Protein Needs: + grams protein (1.2-1.5+ g pro/Kg)  Justification: Repletion and dialysis  Estimated Fluid Needs: per provider pending fluid status       Summary:   If oral intake continues to be minimal would run TF continuously until intake improves    Martha Sandoval, RD, LD, CNSC   Clinical Dietitian - [facility]

## 2023-01-25 ENCOUNTER — APPOINTMENT (OUTPATIENT)
Dept: PHYSICAL THERAPY | Facility: CLINIC | Age: 58
DRG: 981 | End: 2023-01-25
Attending: INTERNAL MEDICINE
Payer: COMMERCIAL

## 2023-01-25 LAB
ANION GAP SERPL CALCULATED.3IONS-SCNC: 11 MMOL/L (ref 7–15)
BUN SERPL-MCNC: 35.3 MG/DL (ref 6–20)
CALCIUM SERPL-MCNC: 9.4 MG/DL (ref 8.6–10)
CHLORIDE SERPL-SCNC: 96 MMOL/L (ref 98–107)
CREAT SERPL-MCNC: 3.26 MG/DL (ref 0.67–1.17)
DEPRECATED HCO3 PLAS-SCNC: 32 MMOL/L (ref 22–29)
ERYTHROCYTE [DISTWIDTH] IN BLOOD BY AUTOMATED COUNT: 18.5 % (ref 10–15)
GFR SERPL CREATININE-BSD FRML MDRD: 21 ML/MIN/1.73M2
GLUCOSE BLDC GLUCOMTR-MCNC: 110 MG/DL (ref 70–99)
GLUCOSE BLDC GLUCOMTR-MCNC: 124 MG/DL (ref 70–99)
GLUCOSE BLDC GLUCOMTR-MCNC: 145 MG/DL (ref 70–99)
GLUCOSE SERPL-MCNC: 133 MG/DL (ref 70–99)
HCT VFR BLD AUTO: 29.1 % (ref 40–53)
HGB BLD-MCNC: 8.5 G/DL (ref 13.3–17.7)
MCH RBC QN AUTO: 29.2 PG (ref 26.5–33)
MCHC RBC AUTO-ENTMCNC: 29.2 G/DL (ref 31.5–36.5)
MCV RBC AUTO: 100 FL (ref 78–100)
PLATELET # BLD AUTO: 230 10E3/UL (ref 150–450)
POTASSIUM SERPL-SCNC: 4.2 MMOL/L (ref 3.4–5.3)
RBC # BLD AUTO: 2.91 10E6/UL (ref 4.4–5.9)
SODIUM SERPL-SCNC: 139 MMOL/L (ref 136–145)
WBC # BLD AUTO: 10.7 10E3/UL (ref 4–11)

## 2023-01-25 PROCEDURE — 250N000012 HC RX MED GY IP 250 OP 636 PS 637: Performed by: STUDENT IN AN ORGANIZED HEALTH CARE EDUCATION/TRAINING PROGRAM

## 2023-01-25 PROCEDURE — 0W9G3ZX DRAINAGE OF PERITONEAL CAVITY, PERCUTANEOUS APPROACH, DIAGNOSTIC: ICD-10-PCS | Performed by: RADIOLOGY

## 2023-01-25 PROCEDURE — 250N000011 HC RX IP 250 OP 636: Performed by: STUDENT IN AN ORGANIZED HEALTH CARE EDUCATION/TRAINING PROGRAM

## 2023-01-25 PROCEDURE — 250N000013 HC RX MED GY IP 250 OP 250 PS 637: Performed by: STUDENT IN AN ORGANIZED HEALTH CARE EDUCATION/TRAINING PROGRAM

## 2023-01-25 PROCEDURE — 80048 BASIC METABOLIC PNL TOTAL CA: CPT | Performed by: INTERNAL MEDICINE

## 2023-01-25 PROCEDURE — 120N000001 HC R&B MED SURG/OB

## 2023-01-25 PROCEDURE — 85027 COMPLETE CBC AUTOMATED: CPT | Performed by: INTERNAL MEDICINE

## 2023-01-25 PROCEDURE — 250N000009 HC RX 250: Performed by: STUDENT IN AN ORGANIZED HEALTH CARE EDUCATION/TRAINING PROGRAM

## 2023-01-25 PROCEDURE — 97162 PT EVAL MOD COMPLEX 30 MIN: CPT | Mod: GP

## 2023-01-25 PROCEDURE — 90935 HEMODIALYSIS ONE EVALUATION: CPT | Performed by: INTERNAL MEDICINE

## 2023-01-25 PROCEDURE — 250N000013 HC RX MED GY IP 250 OP 250 PS 637: Performed by: INTERNAL MEDICINE

## 2023-01-25 PROCEDURE — 97110 THERAPEUTIC EXERCISES: CPT | Mod: GP

## 2023-01-25 PROCEDURE — 97530 THERAPEUTIC ACTIVITIES: CPT | Mod: GP

## 2023-01-25 PROCEDURE — 99233 SBSQ HOSP IP/OBS HIGH 50: CPT | Performed by: INTERNAL MEDICINE

## 2023-01-25 PROCEDURE — 250N000011 HC RX IP 250 OP 636: Performed by: INTERNAL MEDICINE

## 2023-01-25 PROCEDURE — 258N000003 HC RX IP 258 OP 636: Performed by: INTERNAL MEDICINE

## 2023-01-25 RX ORDER — ALBUMIN (HUMAN) 12.5 G/50ML
50 SOLUTION INTRAVENOUS
Status: DISCONTINUED | OUTPATIENT
Start: 2023-01-25 | End: 2023-01-25

## 2023-01-25 RX ADMIN — TACROLIMUS 1 MG: 5 CAPSULE ORAL at 18:07

## 2023-01-25 RX ADMIN — SODIUM CHLORIDE 250 ML: 9 INJECTION, SOLUTION INTRAVENOUS at 14:07

## 2023-01-25 RX ADMIN — LACTULOSE 20 G: 10 SOLUTION ORAL at 09:08

## 2023-01-25 RX ADMIN — NYSTATIN 500000 UNITS: 100000 SUSPENSION ORAL at 09:08

## 2023-01-25 RX ADMIN — Medication 40 MG: at 15:43

## 2023-01-25 RX ADMIN — LEVETIRACETAM 1000 MG: 100 SOLUTION ORAL at 21:13

## 2023-01-25 RX ADMIN — LACTULOSE 20 G: 10 SOLUTION ORAL at 21:02

## 2023-01-25 RX ADMIN — LIDOCAINE 1 PATCH: 560 PATCH PERCUTANEOUS; TOPICAL; TRANSDERMAL at 09:07

## 2023-01-25 RX ADMIN — RIFAXIMIN 550 MG: 550 TABLET ORAL at 21:02

## 2023-01-25 RX ADMIN — SODIUM CHLORIDE 300 ML: 9 INJECTION, SOLUTION INTRAVENOUS at 14:07

## 2023-01-25 RX ADMIN — HEPARIN SODIUM 1900 UNITS: 1000 INJECTION INTRAVENOUS; SUBCUTANEOUS at 14:11

## 2023-01-25 RX ADMIN — LACOSAMIDE 100 MG: 10 SOLUTION ORAL at 21:03

## 2023-01-25 RX ADMIN — NYSTATIN 500000 UNITS: 100000 SUSPENSION ORAL at 21:04

## 2023-01-25 RX ADMIN — RIFAXIMIN 550 MG: 550 TABLET ORAL at 09:08

## 2023-01-25 RX ADMIN — Medication 5 ML: at 09:06

## 2023-01-25 RX ADMIN — HEPARIN SODIUM 1900 UNITS: 1000 INJECTION INTRAVENOUS; SUBCUTANEOUS at 14:10

## 2023-01-25 RX ADMIN — MIDODRINE HYDROCHLORIDE 5 MG: 5 TABLET ORAL at 09:07

## 2023-01-25 RX ADMIN — APIXABAN 5 MG: 5 TABLET, FILM COATED ORAL at 21:03

## 2023-01-25 RX ADMIN — LACOSAMIDE 100 MG: 10 SOLUTION ORAL at 09:06

## 2023-01-25 RX ADMIN — TRAZODONE HYDROCHLORIDE 50 MG: 50 TABLET ORAL at 21:02

## 2023-01-25 RX ADMIN — TACROLIMUS 1 MG: 5 CAPSULE ORAL at 09:08

## 2023-01-25 RX ADMIN — MIDODRINE HYDROCHLORIDE 5 MG: 5 TABLET ORAL at 18:07

## 2023-01-25 RX ADMIN — Medication 40 MG: at 09:06

## 2023-01-25 RX ADMIN — WHITE PETROLATUM 1 G: 1.75 OINTMENT TOPICAL at 09:09

## 2023-01-25 RX ADMIN — FOLIC ACID 1 MG: 5 INJECTION, SOLUTION INTRAMUSCULAR; INTRAVENOUS; SUBCUTANEOUS at 09:07

## 2023-01-25 RX ADMIN — ACETAMINOPHEN 650 MG: 325 TABLET, FILM COATED ORAL at 12:20

## 2023-01-25 ASSESSMENT — ACTIVITIES OF DAILY LIVING (ADL)
ADLS_ACUITY_SCORE: 64
DEPENDENT_IADLS:: INDEPENDENT
ADLS_ACUITY_SCORE: 64

## 2023-01-25 NOTE — CONSULTS
Care Management Initial Consult   Plan of care and discharge plans discussed with spouse Ofe.  Patient transferred from [facility] for  SMALL LOCULATED R PLEURAL EFFUSION-CXS negative, had recent pneumococcal pneumonia 11/22 and was transferred back to [place]  Spouse requested transfer to [facility]. Writer notified Rashawn liaison from [facility] who has declined patient as patient did not have an ICU stay this admission.  Writer notified Kalyani from [place] regarding transfer back to the facility. Kalyani stating they do not have an opening this week and feels patients condition is soft to meet [facility] criteria. Liaison requested referrals to be sent to TCU and if declined by TCU she would be able to review the case.   Patient was independent prior to his admission  On 11/12/23 after cardiac arrest  stabilized and there after transferred  to [facility]  On 12/15 for further care.  Patient presently has G-tube feeding,rectal tube, dialysis 3x/week,monitored closely for respiratory issues.   Wife Ofe notified of discharge planning.    General Information  Assessment completed with: Spouse or significant other, Ofe  Type of CM/SW Visit: Initial Assessment    Primary Care Provider verified and updated as needed: Yes   Readmission within the last 30 days: previous discharge plan unsuccessful   Return Category: Exacerbation of disease  Reason for Consult: care coordination/care conference, discharge planning, facility placement  Advance Care Planning:            Communication Assessment  Patient's communication style: spoken language (English or Bilingual)    Hearing Difficulty or Deaf: no   Wear Glasses or Blind: no    Cognitive  Cognitive/Neuro/Behavioral: .WDL except  Level of Consciousness: alert  Arousal Level: arouses to voice  Orientation: disoriented to, situation, time  Mood/Behavior: calm  Best Language: 0 - No aphasia  Speech: trached, hoarse    Living Environment:   People in home: spouse     Current living  Arrangements: condominium      Able to return to prior arrangements: no       Family/Social Support:  Care provided by: self  Provides care for: no one  Marital Status:   Significant Other          Description of Support System: Involved    Support Assessment: Adequate family and caregiver support    Current Resources:   Patient receiving home care services: No     Community Resources: None  Equipment currently used at home: other (see comments) (pt has not been home. from LTACH)  Supplies currently used at home:      Employment/Financial:  Employment Status: self-employed        Financial Concerns:             Lifestyle & Psychosocial Needs:  Social Determinants of Health     Tobacco Use: Low Risk      Smoking Tobacco Use: Never     Smokeless Tobacco Use: Never     Passive Exposure: Not on file   Alcohol Use: Not on file   Financial Resource Strain: Not on file   Food Insecurity: Not on file   Transportation Needs: Not on file   Physical Activity: Not on file   Stress: Not on file   Social Connections: Not on file   Intimate Partner Violence: Not on file   Depression: Not at risk     PHQ-2 Score: 0   Housing Stability: Not on file       Functional Status:  Prior to admission patient needed assistance:   Dependent ADLs:: Independent  Dependent IADLs:: Independent       Mental Health Status:  Mental Health Status: No Current Concerns       Chemical Dependency Status:  Chemical Dependency Status: No Current Concerns             Values/Beliefs:  Spiritual, Cultural Beliefs, Shinto Practices, Values that affect care:                     Louann Machado RN CC  [phone]

## 2023-01-25 NOTE — PROGRESS NOTES
Renal Medicine Progress Note            Assessment/Plan:     # ESKD:  ATN on top of CKD without recovery.  He has been on chronic HD MWF via R internal jugular CVC.  He runs for 3 H with removal of 1-2 KG as BP allows. BP supported with midodrine.  .       # Loculated Pleural effusions.  Snall on R and minimal on L.       # Anemia:  He has been on EPO 40K weekly.       # Chronic Hypotension: BP supported with midodrine.       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  Will check PTH and Vit D    # Ascites s/p paracentesis 4.8 liters 1/24.     Plan:  # 3 hrs HD, UF ~ 2 liters net. Na 140, bicarb 35, 3K. Qb 250-350 ml/min vi R TDC.  # Epo 13K units        Interval History:     He is getting HD tx.  He wants to know when is the next paracentesis.  I explained this to him.  He wants pain medication.           Medications and Allergies:       - MEDICATION INSTRUCTIONS for Dialysis Patients -   Does not apply See Admin Instructions     sodium chloride 0.9%  250 mL Intravenous Once in dialysis/CRRT     sodium chloride 0.9%  300 mL Hemodialysis Machine Once     acetylcysteine  2 mL Nebulization BID     [Held by provider] apixaban ANTICOAGULANT  5 mg Oral BID     B and C vitamin Complex with folic acid  5 mL Oral or Feeding Tube Daily     epoetin mirta-epbx  13,000 Units Intravenous Once     folic acid  1 mg Intravenous Daily     sodium chloride (PF) 0.9%  10 mL Intracatheter Once in dialysis/CRRT    Followed by     heparin  1.3-2.6 mL Intracatheter Once in dialysis/CRRT     sodium chloride (PF) 0.9%  10 mL Intracatheter Once in dialysis/CRRT    Followed by     heparin  1.3-2.6 mL Intracatheter Once in dialysis/CRRT     insulin aspart  1-7 Units Subcutaneous TID AC     insulin aspart  1-5 Units Subcutaneous At Bedtime     [Held by provider] insulin glargine  5 Units Subcutaneous QAM AC     ipratropium - albuterol 0.5 mg/2.5 mg/3 mL  3 mL Nebulization 4x daily     lacosamide  100 mg Oral or Feeding Tube BID     lactulose  20 g Oral BID      levETIRAcetam  1,000 mg Oral or Feeding Tube Q24H     lidocaine  1 patch Transdermal Q24H     lidocaine   Transdermal Q8H BIJU     midodrine  5 mg Oral or Feeding Tube TID w/meals     mineral oil-hydrophilic petrolatum   Topical Daily     - MEDICATION INSTRUCTIONS -   Does not apply Once     nystatin  500,000 Units Swish & Spit 4x Daily     pantoprazole  40 mg Oral or Feeding Tube BID AC     rifaximin  550 mg Oral or Feeding Tube BID     sodium chloride (PF)  10-30 mL Intracatheter Q8H     sodium chloride (PF)  3 mL Intracatheter Q8H     tacrolimus  1 mg Oral BID IS     traZODone  25 mg Oral or Feeding Tube At Bedtime    Or     traZODone  50 mg Oral or Feeding Tube At Bedtime      No Known Allergies         Physical Exam:   Vitals were reviewed   , Blood pressure 100/57, pulse 109, temperature 98.3  F (36.8  C), temperature source Oral, resp. rate 20, weight 88.2 kg (194 lb 7.1 oz), SpO2 93 %.    Wt Readings from Last 3 Encounters:   01/23/23 88.2 kg (194 lb 7.1 oz)   01/21/23 82.4 kg (181 lb 11.2 oz)   12/28/22 96 kg (211 lb 10.3 oz)       Intake/Output Summary (Last 24 hours) at 1/25/2023 1151  Last data filed at 1/25/2023 1000  Gross per 24 hour   Intake 540 ml   Output --   Net 540 ml       GENERAL APPEARANCE: Chronically ill  HEENT:  Eyes/ears/nose/neck grossly normal  RESP: lungs cta b c good efforts, no crackles, rhonchi or wheezes  CV: RRR, nl S1/S2  ABDOMEN: Distended, soft, ND  EXTREMITIES/SKIN: no rashes/lesions on observed skin; no edema  NEURO: Awake, alert and asking questions         Data:     CBC RESULTS:     Recent Labs   Lab 01/25/23  0522 01/24/23  0715 01/23/23  0638 01/22/23  0534 01/19/23  1034 01/19/23  0631 01/19/23  0627   WBC 10.7 9.5 7.6 7.0  --   --  7.2   RBC 2.91* 2.75* 2.63* 2.88*  --   --  2.00*   HGB 8.5* 7.9* 7.6* 8.4* 8.1* 7.7* 5.9*   HCT 29.1* 27.0* 25.8* 28.6*  --   --  19.8*    209 207 231  --   --  238       Basic Metabolic Panel:  Recent Labs   Lab 01/25/23  0570  01/25/23  0237 01/24/23  2244 01/24/23  1738 01/24/23  1129 01/24/23  0715 01/23/23  0743 01/23/23  0638 01/22/23  2208 01/22/23  1407 01/22/23  1147 01/22/23  0528 01/19/23  2328 01/19/23  0631 01/19/23  0627     --   --   --   --  137  --  136  --  135*  --  136  --  135* 134*   POTASSIUM 4.2  --   --   --   --  3.9  --  4.0  --  4.0  --  3.8  --  3.6 3.7   CHLORIDE 96*  --   --   --   --  99  --  94*  --  96*  --  94*  --   --  96*   CO2 32*  --   --   --   --  31*  --  30*  --  26  --  29  --   --  28   BUN 35.3*  --   --   --   --  27.0*  --  43.7*  --  35.0*  --  33.6*  --   --  26.3*   CR 3.26*  --   --   --   --  2.49*  --  3.48*  --  2.92*  --  2.68*  --   --  2.21*   * 124* 125* 112* 94 92   < > 93   < > 119*   < > 90   < > 71 73   KELLY 9.4  --   --   --   --  8.8  --  9.2  --  9.2  --  9.1  --   --  8.9    < > = values in this interval not displayed.       INRNo lab results found in last 7 days.   Attestation:   I have reviewed today's relevant vital signs, notes, medications, labs and imaging.    Zenon Bradshaw MD  [facility] - Nephrology  Office phone :[phone]  Pager: [phone]

## 2023-01-25 NOTE — PROGRESS NOTES
[facility]    Hospitalist Progress Note    Assessment & Plan   Blanco Osborne is a 57 year old male admitted on 1/21/2023 as a transfer from [facility] for evaluation of loculated pleural effusion. Patient has multiple medical problems including history of liver transplant 2016 due to alcohol abuse, pAF on chronic anticoagulation, history of DM2, CVA, HTN, CHRISTIAN on BiPAP. 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 Discharge to [facility], back to [facility] and back to [facility] during month of December.     Recently while at [facility] there were concerns for worsening effusions. He had thoracentesis 01/13 which returned exudative without growth on cultures. CT chest/abdomen/pelvis on 01/18 demonstrated worsening effusions and concerns for infected parapneumonic effusions with possible SBP.  CT findings discussed with Pulmonology team with 3 different pulmonologist including (Dr. Monge, Dr. Chu and Dr. Jay) with agreement that patient needs chest tube and possible lytics and therefore transfer to higher level of care.      See discharge summary 01/21/2023 for more details of his recent hx.      Bilateral pleural effusions, ? empyema  Acute now chronic respiratory failure requiring tracheostomy  - resolved    Pneumonia  - resolved   ARDS  - resolved    Right pneumothorax - resolved   Initial event was parainfluenza and strep pneumo pneumonia back in November 2022. Treated for ARDS. Had failed extubation x2 and tracheostomy performed last hospitalization.  Had additional complications of bilateral pneumothoraces as well as hydropneumothorax- pleural fluid grew candida parapsilosis. He was treated with IV micafungin and currently on fluconazole, to finish 4-week course. Chest tube was placed and subsequently removed. While in  [facility] he was successfully weaned from the ventilator. His respiratory status is now stable on room air.   * Most recently, there has been concern for right complicated parapneumonic effusion. S/p thoracentesis 11/26 and 1/13.  Right thoracentesis 1/13 consistent with exudative effusion with elevated LDH, high neutrophils, cultures show NGTD. CT on 1/18 showed small bilateral partially loculated pleural effusions with associated pleural enhancement consistent with infected effusions. Pulmonary consultants at [facility] have recommended transfer to [facility] for consideration of chest tube placement and possible intra-pleural lytics. Dr. Howe with thoracic surgery consulted prior to transfer.      - CT chest ordered by Dr. Jackson 1/22, small effusions on imaging and no plans for a chest tube. Can consider a diagnostic thoracentesis however unclear utility. Patient had one at [facility] 01/13 which is exudative but cultures have been NGTD. Currently no s/s of worsening infection only elevated inflammatory markers. ID currently following, appreciate their input regarding need for thoracentesis and not starting antibiotics.  -Completed 4-week treatment for Candida.  - Continue mucomyst, albuterol and duonebs   - Tracheostomy care per RT  -Discussed with social work team to look into [facility] for transfer, patient can be transferred as early as 1/25.     S/p Liver transplant 2016  Cirrhosis with ascites  Subacute bacterial peritonitis  Main manifestations of this are hepatic encephalopathy and ascites.  Hepatologist at [place] felt that most likely reason for the cirrhosis was metabolic syndrome or alcohol intake.  There was no evidence of rejection on biopsy and there was some evidence of regeneration. Paracentesis done on 12/22/2022 showed lactobacillus indicating SBP, treated with Unasyn and Augmentin, finished 2-week course 1/12/2023.  Requires large-volume paracentesis periodically for recurring ascites.    * Paracentesis  1/13/2023 yielded about 7500 cc. Cultures NGTD.    * Repeat CT abdomen/pelvis on 1/18 concerning for SBP given peritoneal enhancement.  * Repeat labs 1/19:  procalcitonin 1.64 from 1.81.  Ammonia 32.   -Due to abdominal distention will order a therapeutic paracentesis, will also order cell counts on it to rule out SBP but the patient did not have any symptoms of SBP.  - Continue intermittent paracentesis as needed if develops symptomatic ascites.    - Further treatment for recurrent ascites with TIPS deferred to his Liver Transplant team and/or Hepatology, he has an appointment on 4/21/2023 with Hepatology, Dr. Simms  - Continue Tacrolimus 1 mg BID.  - Continue Lactulose and Rifaximin   -Patient underwent another paracentesis on 1/25 with a 4.8 L removed, nausea hypotension noted following that.  Patient does have fullness after eating.     Acute encephalopathy  Patient AOx4 this morning but drowsy and lethargic. Suspect multifactorial related to cirrhosis and medications. He receive Ambien last night for sleep  - place lidocaine patch to limit narcotics  - stop ambien  - continue care for cirrhosis above  - delirium precautions  -Acute metabolic encephalopathy. is completely resolved, patient is alert oriented x3.     Diarrhea  Liquid stool requiring rectal tube. Patient transferred with this the rectal tube and unclear how long the liquid diarrhea has been going on. Foul smelling  - C. difficile negative, diarrhea present, will initiate Imodium.     Paroxysmal atrial fibrillation  Remains in sinus rhythm.  On anticoagulation with apixaban indefinitely for stroke prophylaxis.    -Apixaban was on hold, will restart that.  - Cardiac monitoring       ESRD: Now on iHD.  No return of renal function.  - MWF schedule   - Nephrology consulted, patient is undergoing dialysis here as per schedule.     Acute anemia  Pt has required intermittent transfusions for on-going anemia.   -Anemia most likely secondary to  critical illness/chronic disease, chronic renal disease  -Transfuse packed red blood cells to keep Hb> 7  -Monitor H&H.     History PEA arrest   PEA arrest prior to his previous admission and 1 episode of PEA associated with desaturation on 12/20.  No structural cardiac disease.   - Continue Cardiac Monitoring     Hypotension  Also has developed baseline hypotension 2/2 cirrhosis and prolonged critical illness.   -Continue current midodrine dose       Seizure after hypoxic event.  This is in the context of baseline multifactorial encephalopathy secondary to his ongoing critical illness and prior cardiac arrests and prior strokes and cirrhosis with hepatic encephalopathy. MRI of head of 12/20/22 reveals no PRES or leptomeningeal enhancement but scattered.  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   - Continue with  Keppra and Vimpat       Diabetes mellitus type 2  -Lantus decreased to 5 units daily on 1/19, hold this for now due to low PO intake  -MDSSI  -Hypoglycemic protocol in place     Moderate malnutrition, protein and calorie type.  -Continue with tube feeds via PEG tube, patient refusing to eat or have TF restarted. He reports ongoing nausea and distention.  Calorie count has been initiated, discussed that with family and discussed that with patient in detail.  - Nutritionist consulted and following  Underwent another paracentesis on 1/24 with almost 5 L removed.      DVT Prophylaxis: DOAC's  Code Status: Full Code     55 MINUTES SPENT BY ME on the date of service doing chart review, history, exam, documentation & further activities per the note.  Disposition: Expected discharge 1/25-hour after depending on when he would have a bed available at [facility] or by the staff.     Clinically Significant Risk Factors              # Hypoalbuminemia: Lowest albumin = 1.9 g/dL at 1/23/2023  6:38 AM, will monitor as appropriate            # Severe Malnutrition: based  on nutrition assessment, PRESENT ON ADMISSION       Carol Garcia MD  Text Page   (7am to 6pm)    Interval History   Patient was not feeling well today, he could not pinpoint any particular issue, he underwent paracentesis and his abdominal bloating is improved, he had refused tube feedings in the past.  His calorie count is ongoing.    -Data reviewed today: I reviewed all new labs and imaging results over the last 24 hours.    Physical Exam     Vital Signs with Ranges  Temp:  [97.8  F (36.6  C)-98.3  F (36.8  C)] 98.2  F (36.8  C)  Pulse:  [] 101  Resp:  [20-22] 20  BP: ()/(47-69) 82/47  SpO2:  [92 %-98 %] 95 %  I/O last 3 completed shifts:  In: 560 [P.O.:320; NG/GT:240]  Out: -     Constitutional: Awake, alert, cooperative, no apparent distress  Respiratory: Clear to auscultation bilaterally, no crackles or wheezing  Cardiovascular: Regular rate and rhythm, normal S1 and S2, and no murmur noted  GI: Abdomen continues to be distended, bowel sounds present, ascites have improved.  Skin/Integumen: No rashes, no cyanosis, no edema  Neuro : moving all 4 extremities, no focal deficit noted     Medications       - MEDICATION INSTRUCTIONS for Dialysis Patients -   Does not apply See Admin Instructions     sodium chloride 0.9%  250 mL Intravenous Once in dialysis/CRRT     sodium chloride 0.9%  300 mL Hemodialysis Machine Once     acetylcysteine  2 mL Nebulization BID     [Held by provider] apixaban ANTICOAGULANT  5 mg Oral BID     B and C vitamin Complex with folic acid  5 mL Oral or Feeding Tube Daily     epoetin mirta-epbx  13,000 Units Intravenous Once     folic acid  1 mg Intravenous Daily     sodium chloride (PF) 0.9%  10 mL Intracatheter Once in dialysis/CRRT    Followed by     heparin  1.3-2.6 mL Intracatheter Once in dialysis/CRRT     sodium chloride (PF) 0.9%  10 mL Intracatheter Once in dialysis/CRRT    Followed by     heparin  1.3-2.6 mL Intracatheter Once in dialysis/CRRT     insulin aspart  1-7  Units Subcutaneous TID AC     insulin aspart  1-5 Units Subcutaneous At Bedtime     [Held by provider] insulin glargine  5 Units Subcutaneous QAM AC     ipratropium - albuterol 0.5 mg/2.5 mg/3 mL  3 mL Nebulization 4x daily     lacosamide  100 mg Oral or Feeding Tube BID     lactulose  20 g Oral BID     levETIRAcetam  1,000 mg Oral or Feeding Tube Q24H     lidocaine  1 patch Transdermal Q24H     lidocaine   Transdermal Q8H BIJU     midodrine  5 mg Oral or Feeding Tube TID w/meals     mineral oil-hydrophilic petrolatum   Topical Daily     - MEDICATION INSTRUCTIONS -   Does not apply Once     nystatin  500,000 Units Swish & Spit 4x Daily     pantoprazole  40 mg Oral or Feeding Tube BID AC     rifaximin  550 mg Oral or Feeding Tube BID     sodium chloride (PF)  10-30 mL Intracatheter Q8H     sodium chloride (PF)  3 mL Intracatheter Q8H     tacrolimus  1 mg Oral BID IS     traZODone  25 mg Oral or Feeding Tube At Bedtime    Or     traZODone  50 mg Oral or Feeding Tube At Bedtime       Data   Recent Labs   Lab 01/25/23  0522 01/25/23  0237 01/24/23  2244 01/24/23  1129 01/24/23  0715 01/23/23  0743 01/23/23  0638 01/22/23  0534 01/22/23  0528 01/19/23  0631 01/19/23  0627   WBC 10.7  --   --   --  9.5  --  7.6   < >  --   --  7.2   HGB 8.5*  --   --   --  7.9*  --  7.6*   < >  --    < > 5.9*     --   --   --  98  --  98   < >  --   --  99     --   --   --  209  --  207   < >  --   --  238     --   --   --  137  --  136   < > 136   < > 134*   POTASSIUM 4.2  --   --   --  3.9  --  4.0   < > 3.8   < > 3.7   CHLORIDE 96*  --   --   --  99  --  94*   < > 94*  --  96*   CO2 32*  --   --   --  31*  --  30*   < > 29  --  28   BUN 35.3*  --   --   --  27.0*  --  43.7*   < > 33.6*  --  26.3*   CR 3.26*  --   --   --  2.49*  --  3.48*   < > 2.68*  --  2.21*   ANIONGAP 11  --   --   --  7  --  12   < > 13  --  10   KELYL 9.4  --   --   --  8.8  --  9.2   < > 9.1  --  8.9   * 124* 125*   < > 92   < > 93   < >  90   < > 73   ALBUMIN  --   --   --   --   --   --  1.9*  --  2.1*   < > 1.9*   PROTTOTAL  --   --   --   --   --   --   --   --  6.4  --  6.2*   BILITOTAL  --   --   --   --   --   --   --   --  0.5  --  0.5   ALKPHOS  --   --   --   --   --   --   --   --  260*  --  236*   ALT  --   --   --   --   --   --   --   --  9*  --  8*   AST  --   --   --   --   --   --   --   --  24  --  20    < > = values in this interval not displayed.     Recent Labs   Lab 01/25/23  0522 01/25/23  0237 01/24/23  2244 01/24/23  1738 01/24/23  1129   * 124* 125* 112* 94       Imaging:   Recent Results (from the past 24 hour(s))   US Paracentesis without Albumin    Narrative    US PARACENTESIS WITHOUT ALBUMIN 1/24/2023 4:28 PM     HISTORY: HIGH VOLUME paracentesis with or without diagnostic fluid  analysis with labs to be drawn if ordered. Total paracentesis volume  as much as possible.    FINDINGS: Ultrasound was used to evaluate for the presence and best  approach for paracentesis. Written and oral informed consent was  obtained. A pause for the cause procedure to verify the correct  patient and correct procedure. The skin overlying the right lower  quadrant was prepped and draped in the usual sterile fashion. The  subcutaneous tissues were anesthetized with 10mL 1% lidocaine. A  catheter was advanced into the peritoneal space and 4.8 L of  straw  colored fluid was drained. There were no immediate complications.  Ultrasound images were permanently stored.  Patient left the  ultrasound suite in satisfactory condition.      Impression    IMPRESSION: Technically successful paracentesis without immediate  complications.    HANS WESTON MD         SYSTEM ID:  [id number]

## 2023-01-25 NOTE — PROGRESS NOTES
Pt seen for neb tx. BS diminished. Pt has # 7 Bivona trach capped.on RA. Neb given per order.trach dressing changed.skin intact. Will continue to monitor.    Traci Kwong, RT

## 2023-01-25 NOTE — PROGRESS NOTES
01/25/23 1552   Appointment Info   Signing Clinician's Name / Credentials (PT) Catherine Wells, PT, DPT   Living Environment   People in Home alone   Current Living Arrangements house;condominium   Home Accessibility no concerns   Transportation Anticipated health plan transportation   Living Environment Comments per chart stairs to enter home   Self-Care   Usual Activity Tolerance excellent   Current Activity Tolerance poor   Regular Exercise Yes   Activity/Exercise Type biking;strength training   Exercise Amount/Frequency daily   Activity/Exercise/Self-Care Comment per chart, prior to recent hosp. stays, pt worked full time and IND, wife is a nurse   General Information   Onset of Illness/Injury or Date of Surgery 01/21/23   Referring Physician Carol Garcia MD   Patient/Family Therapy Goals Statement (PT) to walk again   Pertinent History of Current Problem (include personal factors and/or comorbidities that impact the POC) 56 y/o M admitted from [facility] with B pleural effusions. Patient has multiple medical problems including history of liver transplant 2016 due to alcohol abuse, pAF on chronic anticoagulation, history of DM2, CVA, HTN, CHRISTIAN on BiPAP. 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 Discharge to [facility], back to [facility] and back to [facility] during month of December.     Recently while at [facility] there were concerns for worsening effusions. He had thoracentesis 01/13 which returned exudative without growth on cultures. CT chest/abdomen/pelvis on 01/18 demonstrated worsening effusions and concerns for infected parapneumonic effusions with possible SBP.  CT findings discussed with Pulmonology team with 3 different pulmonologist including (Dr. Monge, Dr. Chu and Dr. Jay) with agreement that patient needs chest tube and possible lytics  and therefore transfer to higher level of care.  See chart for further medical hx.   Existing Precautions/Restrictions fall  (PEG tube, trach)   General Observations resting in bed, pt's brother Dylan present at bedside. feeding tube, rectal tube, capped trach   Cognition   Affect/Mental Status (Cognition) confused;flat/blunted affect   Orientation Status (Cognition) person;place   Follows Commands (Cognition) delayed response/completion;increased processing time needed;physical/tactile prompts required   Cognitive Status Comments pt with mild confusion, asked repeated questions, questions asked were not consistent with brother Dylan's responses with timeline of events, recent therapy participation. pt endorses feeling confused at times.   Pain Assessment   Patient Currently in Pain Yes, see Vital Sign flowsheet  (report some general pain in right LE with movement)   Posture    Posture Kyphosis;Protracted shoulders;Forward head position   Posture Comments trunk in C shape with EOB sitting   Range of Motion (ROM)   ROM Comment grossly WFL B LE with AAROM exept limited DF B past neutral and limited right great toe ext-pain with over pressure at end range   Strength (Manual Muscle Testing)   Strength Comments able to perform partial ROM with SAQ and LAQ B LEs- more weakness in left quad, 2-/5 B hips, 3-/5 ankles, grossly 2 to 3-/5 bilateral UEs, more weakness on left UE, weak B  strength   Bed Mobility   Comment, (Bed Mobility) min A x 2 with rolling with railing   Transfers   Comment, (Transfers) lift   Gait/Stairs (Locomotion)   Comment, (Gait/Stairs) unable currently   Balance   Balance Comments min with EOB sitting with UE support and sling behind him   Sensory Examination   Sensory Perception Comments pt reports decreased sensation B feet   Clinical Impression   Criteria for Skilled Therapeutic Intervention Yes, treatment indicated   PT Diagnosis (PT) impaired transfers   Influenced by the following impairments  impaired strength, decreased activity tolerance, impaired cognition, impaired balance   Functional limitations due to impairments impaired IND with functional mobility   Clinical Presentation (PT Evaluation Complexity) Evolving/Changing   Clinical Presentation Rationale clinical judgement, complex medical status   Clinical Decision Making (Complexity) moderate complexity   Planned Therapy Interventions (PT) balance training;bed mobility training;gait training;home exercise program;motor coordination training;neuromuscular re-education;patient/family education;postural re-education;home program guidelines;risk factor education;progressive activity/exercise;transfer training;strengthening;ROM (range of motion);stretching   Risk & Benefits of therapy have been explained evaluation/treatment results reviewed;care plan/treatment goals reviewed;risks/benefits reviewed;current/potential barriers reviewed;participants voiced agreement with care plan;participants included;patient;sibling   PT Total Evaluation Time   PT Eval, Moderate Complexity Minutes (77432) 10   Plan of Care Review   Plan of Care Reviewed With patient;family   Physical Therapy Goals   PT Frequency 5x/week   PT Predicted Duration/Target Date for Goal Attainment 02/08/23   PT Goals Bed Mobility;Transfers;Gait   PT: Bed Mobility Minimal assist;Supine to/from sit   PT: Transfers Maximum assist;Sit to/from stand;Assistive device   PT: Goal 1 pt will be able to sit unsupported x 15 min while performing dynamic balance activities   Therapeutic Procedure/Exercise   Ther. Procedure: strength, endurance, ROM, flexibillity Minutes (65874) 10   Symptoms Noted During/After Treatment fatigue   Treatment Detail/Skilled Intervention worked on LE strengthening with pt in supine including: AAROM SAQs, AAROM hip abd/add, AAROM heel slides. Discussed with pt and brother, can educate family on how to assist pt with ex.   Therapeutic Activity   Therapeutic Activities: dynamic  activities to improve functional performance Minutes (60194) 21   Symptoms Noted During/After Treatment Fatigue   Treatment Detail/Skilled Intervention Utilized ceiling lift sling for supine to EOB tx, worked on sitting balance at EOB, min A, able to progress to CGA intermittently to close SBA, worked on seated LAQs, AAROM B forward/back punches, pt sits in C posture with trunk, cues to correction, however difficulty to change without LOB. increased trunk sway due to weakness/impaired balance. Utilized ceiling lift for transfer up to recliner, increased time with pillow positioning for optimal midline sitting posture and arm/trunk support. Pt left in chair with needs in reach and alarm on-pt's brother present in room to assist as needed. Pt requesting time set for therapy tomorrow-RN notified and time set with pt.   PT Discharge Planning   PT Plan per pt and brother time-plan for wife to be present next session-educate on HEP, sitting balance attempt mark steady when able, rectal tube and PEG tube. dialysis on MWF   PT Discharge Recommendation (DC Rec) Transitional Care Facility;Acute Rehab Center-Motivated patient will benefit from intensive, interdisciplinary therapy.  Anticipate will be able to tolerate 3 hours of therapy per day   PT Rationale for DC Rec Pt was IND with mobility, working FT prior to hospitalization. Pt is motivated and has support wife. Pt would benefit from PT to improve IND with transfers and gait and activity tolerance to progress towards discharge. Pt very motivated and with good participation, may benefit from ARU consideration   PT Brief overview of current status lift for transfers.   Total Session Time   Timed Code Treatment Minutes 31   Total Session Time (sum of timed and untimed services) 41

## 2023-01-25 NOTE — PROGRESS NOTES
"Potassium   Date Value Ref Range Status   01/25/2023 4.2 3.4 - 5.3 mmol/L Final   12/29/2022 3.4 3.4 - 5.3 mmol/L Final   04/20/2020 3.9 3.4 - 5.3 mmol/L Final     Potassium POCT   Date Value Ref Range Status   01/19/2023 3.6 3.5 - 5.0 mmol/L Final     Hemoglobin   Date Value Ref Range Status   01/25/2023 8.5 (L) 13.3 - 17.7 g/dL Final   04/20/2020 14.3 13.3 - 17.7 g/dL Final     Creatinine   Date Value Ref Range Status   01/25/2023 3.26 (H) 0.67 - 1.17 mg/dL Final   04/20/2020 1.06 0.66 - 1.25 mg/dL Final     Urea Nitrogen   Date Value Ref Range Status   01/25/2023 35.3 (H) 6.0 - 20.0 mg/dL Final   12/29/2022 46 (H) 7 - 30 mg/dL Final   04/20/2020 23 7 - 30 mg/dL Final     Sodium   Date Value Ref Range Status   01/25/2023 139 136 - 145 mmol/L Final   04/20/2020 139 133 - 144 mmol/L Final     INR   Date Value Ref Range Status   12/21/2022 1.36 (H) 0.85 - 1.15 Final   10/07/2019 1.20 (H) 0.86 - 1.14 Final       DIALYSIS PROCEDURE NOTE  Hepatitis status of previous patient on machine log was checked and verified ok to use with this patients hepatitis status.  Patient dialyzed for 2 hrs. Pt insisted on ending run at 2 hrs and expressed a desire to \"stop dialysis completely\". Dr Bradshaw aware of both.  K3 bath with a net fluid removal of  0L.  A BFR of 250 ml/min was obtained via a R CVC    The treatment plan was discussed with Dr. Bradshaw during the treatment.    Total heparin received during the treatment: 0 units.     Line flushed, clamped and capped with heparin 1:1000 1.9 mL (1900 units) per lumen    Meds  given: none - Pt wanted to finish before epo could be administered   Complications: none      Person educated: pt. Knowledge base good Barriers to learning: none. Educated on procedure need via verbal mode. patient/family verbalized understanding.   ICEBOAT? Timeout performed pre-treatment  I: Patient was identified using 2 identifiers  C:  Consent Signed Yes  E: Equipment preventative maintenance is current and " dialysis delivery system OK to use  B:    Latest Reference Range & Units 01/12/23 06:21   Hep B Surface Agn Nonreactive  Nonreactive         Hepatitis B Surface Antibody: na; Draw Date: na  O: Dialysis orders present and complete prior to treatment  A: Vascular access verified and assessed prior to treatment  T: Treatment was performed at a clinically appropriate time  ?: Patient was allowed to ask questions and address concerns prior to treatment  See Adult Hemodialysis flowsheet in [facility] for further details and post assessment.  Machine water alarm in place and functioning. Transducer pods intact and checked every 15min.   Pt returned via bed.  Chlorine/Chloramine water system checked every 4 hours.      Post treatment report given to Park Goetz RN regarding 0L of fluid removed, last BP      Jesse Kurtz RN

## 2023-01-25 NOTE — PLAN OF CARE
Goal Outcome Evaluation:    [id number]    COGNITION/MENTATION: A/O x 2, disoriented to time and situation.   CARDIAC/TELE:   RESPIRATORY: On RA. LS diminished. Trach capped.   GI: BS+, incontinent of bowel, rectal tube in place some leakage around tube   : Anuric, on hemodialysis M,W,F  PAIN: no non-verbal signs of pain noted  SKIN: scattered skin tears, PI to coccyx, scattered bruising  DRAINS/LINES: triple lumen picc JULIO CESAR, CVC R chest, PEG  ACTIVITY: A of 2, T&R, lift   DIET: Soft bite size diet w/moderate thick liquid. Supplemented w/bolus feed if intake <50% of meal TID.

## 2023-01-25 NOTE — PROGRESS NOTES
CALORIE COUNT      Approximate Oral Intake for:    1/24/23  Calories:  1352 kcal   Protein:  66 grams       Intake from TF/PN:     Patient refused bolus TF for lunch yesterday but accepted dinner bolus (did not need one at breakfast as ate 75% of his meal).     TF orders in [facility] renal at 80 mL/hr x 3 hrs TID as back up bolus after meals - IF CONSUMES <50% OF MEAL   This is equivalent to 240 mL formula TID or 720 mL/day = 1440 kcal, 66 g protein, 132 g CHO, 0 g fiber and 516 mL free water (meeting approx 58% estimated energy needs and 66% estimated protein needs from TF alone)       Estimated Needs:    Dosing Weight 82.4 kg   Estimated Energy Needs: 1753-0857 kcals (30-35 Kcal/Kg)  Justification: repletion and HD  Estimated Protein Needs: + grams protein (1.2-1.5+ g pro/Kg)  Justification: Repletion and dialysis  Estimated Fluid Needs: per provider pending fluid status       Summary:   Yesterday patient received 1832 kcal (22 kcal/kg, 74% needs) and 88 g protein (1.1 g/kg, 89% needs) with 2 meals and 1 bolus TF.   May consider change to nocturnal TF scheduled tomorrow pending final day of calorie counts today.     Sirena Gomez RD, LD

## 2023-01-26 ENCOUNTER — APPOINTMENT (OUTPATIENT)
Dept: PHYSICAL THERAPY | Facility: CLINIC | Age: 58
DRG: 981 | End: 2023-01-26
Attending: STUDENT IN AN ORGANIZED HEALTH CARE EDUCATION/TRAINING PROGRAM
Payer: COMMERCIAL

## 2023-01-26 LAB
GLUCOSE BLDC GLUCOMTR-MCNC: 132 MG/DL (ref 70–99)
GLUCOSE BLDC GLUCOMTR-MCNC: 134 MG/DL (ref 70–99)
GLUCOSE BLDC GLUCOMTR-MCNC: 142 MG/DL (ref 70–99)
GLUCOSE BLDC GLUCOMTR-MCNC: 151 MG/DL (ref 70–99)

## 2023-01-26 PROCEDURE — 99233 SBSQ HOSP IP/OBS HIGH 50: CPT | Performed by: INTERNAL MEDICINE

## 2023-01-26 PROCEDURE — 120N000001 HC R&B MED SURG/OB

## 2023-01-26 PROCEDURE — 250N000013 HC RX MED GY IP 250 OP 250 PS 637

## 2023-01-26 PROCEDURE — 97110 THERAPEUTIC EXERCISES: CPT | Mod: GP

## 2023-01-26 PROCEDURE — 250N000013 HC RX MED GY IP 250 OP 250 PS 637: Performed by: INTERNAL MEDICINE

## 2023-01-26 PROCEDURE — 250N000013 HC RX MED GY IP 250 OP 250 PS 637: Performed by: STUDENT IN AN ORGANIZED HEALTH CARE EDUCATION/TRAINING PROGRAM

## 2023-01-26 PROCEDURE — 94640 AIRWAY INHALATION TREATMENT: CPT

## 2023-01-26 PROCEDURE — 250N000009 HC RX 250: Performed by: STUDENT IN AN ORGANIZED HEALTH CARE EDUCATION/TRAINING PROGRAM

## 2023-01-26 PROCEDURE — 99232 SBSQ HOSP IP/OBS MODERATE 35: CPT | Performed by: INTERNAL MEDICINE

## 2023-01-26 PROCEDURE — 97530 THERAPEUTIC ACTIVITIES: CPT | Mod: GP

## 2023-01-26 PROCEDURE — 250N000012 HC RX MED GY IP 250 OP 636 PS 637: Performed by: STUDENT IN AN ORGANIZED HEALTH CARE EDUCATION/TRAINING PROGRAM

## 2023-01-26 PROCEDURE — 999N000157 HC STATISTIC RCP TIME EA 10 MIN

## 2023-01-26 PROCEDURE — 99222 1ST HOSP IP/OBS MODERATE 55: CPT | Performed by: REGISTERED NURSE

## 2023-01-26 RX ORDER — HYDROXYZINE HCL 10 MG/5 ML
25-50 SOLUTION, ORAL ORAL
Status: DISCONTINUED | OUTPATIENT
Start: 2023-01-26 | End: 2023-02-08

## 2023-01-26 RX ORDER — FOLIC ACID 1 MG/1
1 TABLET ORAL DAILY
Status: DISCONTINUED | OUTPATIENT
Start: 2023-01-26 | End: 2023-04-28 | Stop reason: HOSPADM

## 2023-01-26 RX ADMIN — LACTULOSE 20 G: 10 SOLUTION ORAL at 21:14

## 2023-01-26 RX ADMIN — ACETAMINOPHEN 650 MG: 325 TABLET, FILM COATED ORAL at 13:35

## 2023-01-26 RX ADMIN — RIFAXIMIN 550 MG: 550 TABLET ORAL at 09:34

## 2023-01-26 RX ADMIN — IPRATROPIUM BROMIDE AND ALBUTEROL SULFATE 3 ML: 2.5; .5 SOLUTION RESPIRATORY (INHALATION) at 15:33

## 2023-01-26 RX ADMIN — LIDOCAINE 1 PATCH: 560 PATCH PERCUTANEOUS; TOPICAL; TRANSDERMAL at 09:34

## 2023-01-26 RX ADMIN — ACETAMINOPHEN 650 MG: 325 TABLET, FILM COATED ORAL at 06:02

## 2023-01-26 RX ADMIN — LACTULOSE 20 G: 10 SOLUTION ORAL at 09:34

## 2023-01-26 RX ADMIN — LEVETIRACETAM 1000 MG: 100 SOLUTION ORAL at 21:39

## 2023-01-26 RX ADMIN — SIMETHICONE 80 MG: 80 TABLET, CHEWABLE ORAL at 10:44

## 2023-01-26 RX ADMIN — TACROLIMUS 1 MG: 5 CAPSULE ORAL at 19:24

## 2023-01-26 RX ADMIN — TACROLIMUS 1 MG: 5 CAPSULE ORAL at 09:33

## 2023-01-26 RX ADMIN — RIFAXIMIN 550 MG: 550 TABLET ORAL at 21:02

## 2023-01-26 RX ADMIN — FOLIC ACID 1 MG: 1 TABLET ORAL at 10:44

## 2023-01-26 RX ADMIN — NYSTATIN 500000 UNITS: 100000 SUSPENSION ORAL at 09:34

## 2023-01-26 RX ADMIN — IPRATROPIUM BROMIDE AND ALBUTEROL SULFATE 3 ML: 2.5; .5 SOLUTION RESPIRATORY (INHALATION) at 07:48

## 2023-01-26 RX ADMIN — ACETAMINOPHEN 650 MG: 325 TABLET, FILM COATED ORAL at 21:02

## 2023-01-26 RX ADMIN — Medication 40 MG: at 15:33

## 2023-01-26 RX ADMIN — HYDROXYZINE HYDROCHLORIDE 25 MG: 10 SOLUTION ORAL at 16:08

## 2023-01-26 RX ADMIN — NYSTATIN 500000 UNITS: 100000 SUSPENSION ORAL at 13:25

## 2023-01-26 RX ADMIN — TRAZODONE HYDROCHLORIDE 50 MG: 50 TABLET ORAL at 21:19

## 2023-01-26 RX ADMIN — Medication 5 ML: at 09:33

## 2023-01-26 RX ADMIN — LACOSAMIDE 100 MG: 10 SOLUTION ORAL at 09:33

## 2023-01-26 RX ADMIN — ACETYLCYSTEINE 2 ML: 200 SOLUTION ORAL; RESPIRATORY (INHALATION) at 07:48

## 2023-01-26 RX ADMIN — WHITE PETROLATUM: 1.75 OINTMENT TOPICAL at 09:35

## 2023-01-26 RX ADMIN — APIXABAN 5 MG: 5 TABLET, FILM COATED ORAL at 09:34

## 2023-01-26 RX ADMIN — Medication 40 MG: at 09:33

## 2023-01-26 RX ADMIN — APIXABAN 5 MG: 5 TABLET, FILM COATED ORAL at 21:02

## 2023-01-26 RX ADMIN — LACOSAMIDE 100 MG: 10 SOLUTION ORAL at 21:04

## 2023-01-26 RX ADMIN — MIDODRINE HYDROCHLORIDE 5 MG: 5 TABLET ORAL at 19:23

## 2023-01-26 RX ADMIN — MIDODRINE HYDROCHLORIDE 5 MG: 5 TABLET ORAL at 13:25

## 2023-01-26 RX ADMIN — MIDODRINE HYDROCHLORIDE 5 MG: 5 TABLET ORAL at 09:34

## 2023-01-26 RX ADMIN — NYSTATIN 500000 UNITS: 100000 SUSPENSION ORAL at 21:05

## 2023-01-26 ASSESSMENT — ACTIVITIES OF DAILY LIVING (ADL)
ADLS_ACUITY_SCORE: 60
ADLS_ACUITY_SCORE: 64
ADLS_ACUITY_SCORE: 64
ADLS_ACUITY_SCORE: 60
ADLS_ACUITY_SCORE: 60
ADLS_ACUITY_SCORE: 64
ADLS_ACUITY_SCORE: 60
ADLS_ACUITY_SCORE: 64
ADLS_ACUITY_SCORE: 60
ADLS_ACUITY_SCORE: 64

## 2023-01-26 NOTE — PROVIDER NOTIFICATION
MD Notification    Notified Person: MD    Notified Person Name: vargas    Notification Date/Time: 1/25/2023 8571    Notification Interaction: paged    Purpose of Notification: Pt refusing telemetry. Palliative consult placed today as pt wants to be done with dialysis and cares.     Orders Received: telemetry d/c    Comments:

## 2023-01-26 NOTE — PROGRESS NOTES
[facility]    Hospitalist Progress Note    Assessment & Plan   Blanco Osborne is a 57 year old male admitted on 1/21/2023 as a transfer from [facility] for evaluation of loculated pleural effusion. Patient has multiple medical problems including history of liver transplant 2016 due to alcohol abuse, pAF on chronic anticoagulation, history of DM2, CVA, HTN, CHRISTIAN on BiPAP. 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 Discharge to [facility], back to [facility] and back to [facility] during month of December.     Recently while at [facility] there were concerns for worsening effusions. He had thoracentesis 01/13 which returned exudative without growth on cultures. CT chest/abdomen/pelvis on 01/18 demonstrated worsening effusions and concerns for infected parapneumonic effusions with possible SBP.  CT findings discussed with Pulmonology team with 3 different pulmonologist including (Dr. Monge, Dr. Chu and Dr. Jay) with agreement that patient needs chest tube and possible lytics and therefore transfer to higher level of care.      See discharge summary 01/21/2023 for more details of his recent hx.      Bilateral pleural effusions, ? empyema  Acute now chronic respiratory failure requiring tracheostomy  - resolved    Pneumonia  - resolved   ARDS  - resolved    Right pneumothorax - resolved   Initial event was parainfluenza and strep pneumo pneumonia back in November 2022. Treated for ARDS. Had failed extubation x2 and tracheostomy performed last hospitalization.  Had additional complications of bilateral pneumothoraces as well as hydropneumothorax- pleural fluid grew candida parapsilosis. He was treated with IV micafungin and currently on fluconazole, to finish 4-week course. Chest tube was placed and subsequently removed. While in  [facility] he was successfully weaned from the ventilator. His respiratory status is now stable on room air.   * Most recently, there has been concern for right complicated parapneumonic effusion. S/p thoracentesis 11/26 and 1/13.  Right thoracentesis 1/13 consistent with exudative effusion with elevated LDH, high neutrophils, cultures show NGTD. CT on 1/18 showed small bilateral partially loculated pleural effusions with associated pleural enhancement consistent with infected effusions. Pulmonary consultants at [facility] have recommended transfer to [facility] for consideration of chest tube placement and possible intra-pleural lytics. Dr. Howe with thoracic surgery consulted prior to transfer.      - CT chest ordered by Dr. Jackson 1/22, small effusions on imaging and no plans for a chest tube. Can consider a diagnostic thoracentesis however unclear utility. Patient had one at [facility] 01/13 which is exudative but cultures have been NGTD. Currently no s/s of worsening infection only elevated inflammatory markers. ID currently following, appreciate their input regarding need for thoracentesis and not starting antibiotics.  -Completed 4-week treatment for Candida.  - Continue mucomyst, albuterol and duonebs   - Tracheostomy care per RT  -Discussed with social work team to look into [facility] for transfer, patient can be transferred as early as 1/25.  1/25, nursing and mentioned that patient had refused to undergo dialysis and want to stop care, palliative care was consulted, on 1/26 had an extensive discussion with the patient and spouse in the room and they denied any wish to stop care or going to comfort measures, they are still full code and wished to pursue full care.  This was discussed with palliative care as well.  S/p Liver transplant 2016  Cirrhosis with ascites  Subacute bacterial peritonitis  Main manifestations of this are hepatic encephalopathy and ascites.  Hepatologist at [facility] felt that most likely reason  for the cirrhosis was metabolic syndrome or alcohol intake.  There was no evidence of rejection on biopsy and there was some evidence of regeneration. Paracentesis done on 12/22/2022 showed lactobacillus indicating SBP, treated with Unasyn and Augmentin, finished 2-week course 1/12/2023.  Requires large-volume paracentesis periodically for recurring ascites.    * Paracentesis 1/13/2023 yielded about 7500 cc. Cultures NGTD.    * Repeat CT abdomen/pelvis on 1/18 concerning for SBP given peritoneal enhancement.  * Repeat labs 1/19:  procalcitonin 1.64 from 1.81.  Ammonia 32.   -Due to abdominal distention will order a therapeutic paracentesis, will also order cell counts on it to rule out SBP but the patient did not have any symptoms of SBP.  - Continue intermittent paracentesis as needed if develops symptomatic ascites.    - Further treatment for recurrent ascites with TIPS deferred to his Liver Transplant team and/or Hepatology, he has an appointment on 4/21/2023 with Hepatology, Dr. Simms  - Continue Tacrolimus 1 mg BID.  - Continue Lactulose and Rifaximin   -Patient underwent another paracentesis on 1/25 with a 4.8 L removed, nausea hypotension noted following that.  Patient does have fullness after eating.     Acute encephalopathy  Patient AOx4 this morning but drowsy and lethargic. Suspect multifactorial related to cirrhosis and medications. He receive Ambien last night for sleep  - place lidocaine patch to limit narcotics  - stop ambien  - continue care for cirrhosis above  - delirium precautions  -Acute metabolic encephalopathy. is completely resolved, patient is alert oriented x3.     Diarrhea  Liquid stool requiring rectal tube. Patient transferred with this the rectal tube and unclear how long the liquid diarrhea has been going on. Foul smelling  - C. difficile negative, diarrhea present, will initiate Imodium.     Paroxysmal atrial fibrillation  Remains in sinus rhythm.  On anticoagulation with  apixaban indefinitely for stroke prophylaxis.    -Apixaban was on hold, will restart that.  - Cardiac monitoring       ESRD: Now on iHD.  No return of renal function.  - MWF schedule   - Nephrology consulted, patient is undergoing dialysis here as per schedule.     Acute anemia  Pt has required intermittent transfusions for on-going anemia.   -Anemia most likely secondary to critical illness/chronic disease, chronic renal disease  -Transfuse packed red blood cells to keep Hb> 7  -Monitor H&H.     History PEA arrest   PEA arrest prior to his previous admission and 1 episode of PEA associated with desaturation on 12/20.  No structural cardiac disease.   - Continue Cardiac Monitoring     Hypotension  Also has developed baseline hypotension 2/2 cirrhosis and prolonged critical illness.   -Continue current midodrine dose       Seizure after hypoxic event.  This is in the context of baseline multifactorial encephalopathy secondary to his ongoing critical illness and prior cardiac arrests and prior strokes and cirrhosis with hepatic encephalopathy. MRI of head of 12/20/22 reveals no PRES or leptomeningeal enhancement but scattered.  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   - Continue with  Keppra and Vimpat       Diabetes mellitus type 2  -Lantus decreased to 5 units daily on 1/19, hold this for now due to low PO intake  -MDSSI  -Hypoglycemic protocol in place     Moderate malnutrition, protein and calorie type.  -Continue with tube feeds via PEG tube, patient refusing to eat or have TF restarted. He reports ongoing nausea and distention.  Calorie count has been initiated, discussed that with family and discussed that with patient in detail.  - Nutritionist consulted and following  Underwent another paracentesis on 1/24 with almost 5 L removed.      DVT Prophylaxis: DOAC's  Code Status: Full Code     55 MINUTES SPENT BY ME on the date of service doing chart review,  history, exam, documentation & further activities per the note.  Disposition: Expected discharge 1/25-hour after depending on when he would have a bed available at [facility] or by the staff.     Clinically Significant Risk Factors              # Hypoalbuminemia: Lowest albumin = 1.9 g/dL at 1/23/2023  6:38 AM, will monitor as appropriate            # Severe Malnutrition: based on nutrition assessment        Carol Garcia MD  Text Page   (7am to 6pm)    Interval History   Long discussion with patient and family regarding CODE STATUS and the need to end dialysis, patient mentioned that when he decided not to undergo dialysis it was due to discomfort, he did not mean to completely stop it since he did not realize that would kill him.  He want to live and he did not want anything that would shorten his life span.  -Data reviewed today: I reviewed all new labs and imaging results over the last 24 hours.    Physical Exam     Vital Signs with Ranges  Temp:  [98.3  F (36.8  C)] 98.3  F (36.8  C)  Pulse:  [100-111] 102  Resp:  [20-24] 24  BP: ()/(58-81) 95/61  SpO2:  [96 %-98 %] 98 %  I/O last 3 completed shifts:  In: 300 [P.O.:120; NG/GT:180]  Out: 300 [Stool:300]    Constitutional: Awake, alert, cooperative, no apparent distress  Respiratory: Clear to auscultation bilaterally, no crackles or wheezing  Cardiovascular: Regular rate and rhythm, normal S1 and S2, and no murmur noted  GI: Abdomen continues to be distended, bowel sounds present, ascites have improved.  Skin/Integumen: No rashes, no cyanosis, no edema  Neuro : moving all 4 extremities, no focal deficit noted     Medications       - MEDICATION INSTRUCTIONS for Dialysis Patients -   Does not apply See Admin Instructions     acetylcysteine  2 mL Nebulization BID     apixaban ANTICOAGULANT  5 mg Oral BID     B and C vitamin Complex with folic acid  5 mL Oral or Feeding Tube Daily     folic acid  1 mg Oral or Feeding Tube Daily     insulin aspart  1-7 Units  Subcutaneous TID AC     insulin aspart  1-5 Units Subcutaneous At Bedtime     [Held by provider] insulin glargine  5 Units Subcutaneous QAM AC     ipratropium - albuterol 0.5 mg/2.5 mg/3 mL  3 mL Nebulization 4x daily     lacosamide  100 mg Oral or Feeding Tube BID     lactulose  20 g Oral BID     levETIRAcetam  1,000 mg Oral or Feeding Tube Q24H     lidocaine  1 patch Transdermal Q24H     lidocaine   Transdermal Q8H BIJU     midodrine  5 mg Oral or Feeding Tube TID w/meals     mineral oil-hydrophilic petrolatum   Topical Daily     nystatin  500,000 Units Swish & Spit 4x Daily     pantoprazole  40 mg Oral or Feeding Tube BID AC     rifaximin  550 mg Oral or Feeding Tube BID     sodium chloride (PF)  10-30 mL Intracatheter Q8H     sodium chloride (PF)  3 mL Intracatheter Q8H     tacrolimus  1 mg Oral BID IS     traZODone  25 mg Oral or Feeding Tube At Bedtime    Or     traZODone  50 mg Oral or Feeding Tube At Bedtime       Data   Recent Labs   Lab 01/26/23  1213 01/26/23  0848 01/26/23  0225 01/25/23  1643 01/25/23  0522 01/24/23  1129 01/24/23  0715 01/23/23  0743 01/23/23  0638 01/22/23  0534 01/22/23  0528   WBC  --   --   --   --  10.7  --  9.5  --  7.6   < >  --    HGB  --   --   --   --  8.5*  --  7.9*  --  7.6*   < >  --    MCV  --   --   --   --  100  --  98  --  98   < >  --    PLT  --   --   --   --  230  --  209  --  207   < >  --    NA  --   --   --   --  139  --  137  --  136   < > 136   POTASSIUM  --   --   --   --  4.2  --  3.9  --  4.0   < > 3.8   CHLORIDE  --   --   --   --  96*  --  99  --  94*   < > 94*   CO2  --   --   --   --  32*  --  31*  --  30*   < > 29   BUN  --   --   --   --  35.3*  --  27.0*  --  43.7*   < > 33.6*   CR  --   --   --   --  3.26*  --  2.49*  --  3.48*   < > 2.68*   ANIONGAP  --   --   --   --  11  --  7  --  12   < > 13   KELLY  --   --   --   --  9.4  --  8.8  --  9.2   < > 9.1   * 132* 142*   < > 133*   < > 92   < > 93   < > 90   ALBUMIN  --   --   --   --   --   --    --   --  1.9*  --  2.1*   PROTTOTAL  --   --   --   --   --   --   --   --   --   --  6.4   BILITOTAL  --   --   --   --   --   --   --   --   --   --  0.5   ALKPHOS  --   --   --   --   --   --   --   --   --   --  260*   ALT  --   --   --   --   --   --   --   --   --   --  9*   AST  --   --   --   --   --   --   --   --   --   --  24    < > = values in this interval not displayed.     Recent Labs   Lab 01/26/23  1213 01/26/23  0848 01/26/23  0225 01/25/23  2150 01/25/23  1643   * 132* 142* 145* 110*       Imaging:   No results found for this or any previous visit (from the past 24 hour(s)).

## 2023-01-26 NOTE — PLAN OF CARE
Goal Outcome Evaluation:    A&Ox2, d/t time & situation. VSS on RA. Tele: ST. Trach capped, dressing CDI. Dim LS. C/o stomach discomfort, lidocaine patch in place, gave PRN tylenol. Mod thick liquids, soft bite sized, poor appetite. PEG tube w/bolus feed x1 in progress. A2/lift, Q2repo, sat in chair. Incontinent of BM, rectal tube in place w/some leakage around tube. Anuric, HD MWF; HD completed early d/t pt wanting to be done. CVC CDI. Scattered skin tears, PI to coccyx, scattered bruising. PICC SL, blood return noted in all lumens. Palliative consulted. Continue to monitor.

## 2023-01-26 NOTE — PLAN OF CARE
Goal Outcome Evaluation:      Plan of Care Reviewed With: patient, spouse    Overall Patient Progress: decliningOverall Patient Progress: declining    Outcome Evaluation: Over the past 5 days patient has only accepted 1 bolus TF when spouse was present. He met < 30-40% nutrition needs via oral intake on 3 day calorie count. Discussed nutrition recommendations if GOC remain restorative - patient declined any TF changes today but plans to think about it. Will continue to check back in pending GOC discussion.    Sirena Gomez RD, LD

## 2023-01-26 NOTE — PROGRESS NOTES
Renal Medicine Progress Note            Assessment/Plan:     # ESKD:  ATN on top of CKD without recovery.  He has been on chronic HD MWF via R internal jugular CVC.  He runs for 3 H with removal of 1-2 KG as BP allows. BP supported with midodrine.  .       # Loculated Pleural effusions.  Snall on R and minimal on L.       # Anemia:  He has been on EPO 40K weekly.       # Chronic Hypotension: BP supported with midodrine.       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  Will check PTH and Vit D     # Ascites s/p paracentesis 4.8 liters 1/24.     Plan:  # Continue MWF IHD. HD order placed for tomorrow  # Epo 13K units with HD.        Interval History:     Patient came off dialysis early on Monday and Wednesday.  He wanted to stop dialysis.  He changed his mind and would like to continue HD tx.   Wife is at the bedside.  She confirmed this.           Medications and Allergies:       - MEDICATION INSTRUCTIONS for Dialysis Patients -   Does not apply See Admin Instructions     acetylcysteine  2 mL Nebulization BID     apixaban ANTICOAGULANT  5 mg Oral BID     B and C vitamin Complex with folic acid  5 mL Oral or Feeding Tube Daily     folic acid  1 mg Oral or Feeding Tube Daily     insulin aspart  1-7 Units Subcutaneous TID AC     insulin aspart  1-5 Units Subcutaneous At Bedtime     [Held by provider] insulin glargine  5 Units Subcutaneous QAM AC     ipratropium - albuterol 0.5 mg/2.5 mg/3 mL  3 mL Nebulization 4x daily     lacosamide  100 mg Oral or Feeding Tube BID     lactulose  20 g Oral BID     levETIRAcetam  1,000 mg Oral or Feeding Tube Q24H     lidocaine  1 patch Transdermal Q24H     lidocaine   Transdermal Q8H BIJU     midodrine  5 mg Oral or Feeding Tube TID w/meals     mineral oil-hydrophilic petrolatum   Topical Daily     nystatin  500,000 Units Swish & Spit 4x Daily     pantoprazole  40 mg Oral or Feeding Tube BID AC     rifaximin  550 mg Oral or Feeding Tube BID     sodium chloride (PF)  10-30 mL Intracatheter Q8H      sodium chloride (PF)  3 mL Intracatheter Q8H     tacrolimus  1 mg Oral BID IS     traZODone  25 mg Oral or Feeding Tube At Bedtime    Or     traZODone  50 mg Oral or Feeding Tube At Bedtime      No Known Allergies         Physical Exam:   Vitals were reviewed   , Blood pressure 95/61, pulse 102, temperature 98.3  F (36.8  C), temperature source Oral, resp. rate 24, weight 86 kg (189 lb 9.5 oz), SpO2 98 %.    Wt Readings from Last 3 Encounters:   01/26/23 86 kg (189 lb 9.5 oz)   01/21/23 82.4 kg (181 lb 11.2 oz)   12/28/22 96 kg (211 lb 10.3 oz)       Intake/Output Summary (Last 24 hours) at 1/26/2023 1314  Last data filed at 1/26/2023 0800  Gross per 24 hour   Intake 240 ml   Output 300 ml   Net -60 ml       GENERAL APPEARANCE: Frail  HEENT:  Eyes/ears/nose/neck grossly normal  RESP: Diminish but clear. No wheezes.  CV: RRR, nl S1/S2  ABDOMEN: Soft. NT.  EXTREMITIES/SKIN: no rashes/lesions on observed skin; no edema  + rectal tube  R TDC side CDI         Data:     CBC RESULTS:     Recent Labs   Lab 01/25/23  0522 01/24/23  0715 01/23/23  0638 01/22/23  0534   WBC 10.7 9.5 7.6 7.0   RBC 2.91* 2.75* 2.63* 2.88*   HGB 8.5* 7.9* 7.6* 8.4*   HCT 29.1* 27.0* 25.8* 28.6*    209 207 231       Basic Metabolic Panel:  Recent Labs   Lab 01/26/23  1213 01/26/23  0848 01/26/23  0225 01/25/23  2150 01/25/23  1643 01/25/23  0522 01/24/23  1129 01/24/23  0715 01/23/23  0743 01/23/23  0638 01/22/23  2208 01/22/23  1407 01/22/23  1147 01/22/23  0528   NA  --   --   --   --   --  139  --  137  --  136  --  135*  --  136   POTASSIUM  --   --   --   --   --  4.2  --  3.9  --  4.0  --  4.0  --  3.8   CHLORIDE  --   --   --   --   --  96*  --  99  --  94*  --  96*  --  94*   CO2  --   --   --   --   --  32*  --  31*  --  30*  --  26  --  29   BUN  --   --   --   --   --  35.3*  --  27.0*  --  43.7*  --  35.0*  --  33.6*   CR  --   --   --   --   --  3.26*  --  2.49*  --  3.48*  --  2.92*  --  2.68*   * 132* 142* 145* 110*  133*   < > 92   < > 93   < > 119*   < > 90   KELLY  --   --   --   --   --  9.4  --  8.8  --  9.2  --  9.2  --  9.1    < > = values in this interval not displayed.       INRNo lab results found in last 7 days.   Attestation:   I have reviewed today's relevant vital signs, notes, medications, labs and imaging.    Zenon Bradshaw MD  [facility] Nephrology  Office phone :[phone]  Pager: [phone]

## 2023-01-26 NOTE — PLAN OF CARE
A&O x2, disoriented to situation and time. Restless at times. VSS on RA. Trach capped.Turn/repo. Given tylenol for pain. Anuric. Rectal tube with moderate loose BMs. Appetite is poor, declined tube feedings bolus. Takes pills crushed in apple sauce. PEG patent. Alarms on. Discharge pending.

## 2023-01-26 NOTE — PROVIDER NOTIFICATION
Brief update:    Patient declines telemetry, has been declining nebulizer treatments.  Expressed a desire to discontinue dialysis to nephrology team earlier today.  A palliative consultation has apparently been placed.    Telemetry order greater than 48 hours old, no longer active.  Ran on dialysis  afternoon, so do not anticipate significant electrolyte derangements at this time.  Does have atrial fibrillation, though would not necessarily necessitate ongoing cardiac telemetry.    As patient is declining telemetry, have discontinued now  order.    Dwain Armijo MD  11:59 PM

## 2023-01-26 NOTE — PROGRESS NOTES
CLINICAL NUTRITION SERVICES - REASSESSMENT NOTE      Recommendations Ordered by Registered Dietitian (RD):   Modify supplements -  4 oz Ensure with breakfast  Gelatein+ with lunch   Orange magic cup with dinner   -- may order additional supplements PRN    No change to TF orders today per patient request. Will continue to check in pending GOC discussion. Did recommend change to nocturnal schedule if GOC are restorative which patient plans to think about.    Future/Additional Recommendations:   If nocturnal TF cycle becomes POC, consider -   Novasource Renal @ 70 mL/hr x 12 hours (8 am - 8 pm) = 840 mL/day, 1680 kcal (20 kcal/kg), 76 g protein (0.9 g/kg), 154 g CHO, 602 ml free water and 0 g fiber daily   --> Meets ~70% minimum nutrition needs    Malnutrition: (1/22)  % Weight Loss:  > 5% in 1 month (severe malnutrition)  % Intake:  </= 50% for >/= 5 days (severe malnutrition)- suspected   Subcutaneous Fat Loss:  Orbital region moderate depletion, Upper arm region moderate-severe depletion and Thoracic region moderate-severe depletion  Muscle Loss:  Temporal region moderate depletion, Clavicle bone region severe depletion, Acromion bone region severe depletion, Patellar region moderate depletion and Anterior thigh region moderate-severe depletion  Fluid Retention:  Trace     Malnutrition Diagnosis: Severe malnutrition  In Context of:  Acute on Chronic illness or disease       EVALUATION OF PROGRESS TOWARD GOALS   Diet:  Soft & Bite Sized Diet + Moderately Thick Liquids per SLP recommendations   Room Service w/ Assist     Supplements: Gelatein plus with lunch and dinnner  Strawberry Ensure at 10 am and 2 pm   Ordering magic cup PRN (orange)    Nutrition Support - Enteral:    Type of Feeding Tube: G-tube  Enteral Frequency:  Continuous  Enteral Regimen: Novasource Renal at 80 mL/hr x 3 hours TID --> IF consumes < 50% of meal  Total Enteral Provisions: 240 mL formula TID or 720 mL/day = 1440 kcal, 66 g protein, 132 g  CHO, 0 g fiber and 516 mL free water (meeting approx 58% estimated energy needs and 66% estimated protein needs)    Free Water Flush: 100 mL before and after each feeding    Calorie Counts:  1/23 - 186 kcal and 10 g protein  1/24 - 1352 kcal and 66 g protein (3 meals, 1 supplement)  1/25 - 806 kcal and 40 g protein (2 meals, 1 supplement)    3-day average = 781 kcal/day and 39 g protein/day     Intake/Tolerance:  Patient met 32% minimum energy needs and 39% minimum protein needs via oral intake on average over the past 3 days. Per chart review, patient did not accept TF boluses on 1/22 or 1/23 due to nausea and bloating. On 1/24 he did accept dinner bolus (1 out of 3). No documentation of TF boluses given yesterday 1/25. Patient only seems to accept TF when spouse is present and telling him that he needs it.     Visited patient and spouse at bedside this morning. Patient notes he is quite uncomfortable with eating and TF given abdominal distention and bloating. Did have paracentesis 1/24 but doesn't seemed to have helped much with his appetite or TF intake. Patient explicitly stated that he doesn't want to die but he also doesn't want to continue TF. He asked me if TF was truly needed to which I responded it is still needed for supplemental nutrition if his goal is to continue getting stronger. Did provide encouragement and acknowledged his good effort with oral intakes. Offered nocturnal TF cycle vs bolus TF given bloating but patient declined both nocturnal TF and bolus TF at this time. He is OK with us checking back in about this and plans to let RN know if he changes his mind sooner.     Labs reviewed  Recent Labs   Lab 01/26/23  0848 01/26/23  0225 01/25/23  2150 01/25/23  1643 01/25/23  0522 01/25/23  0237   * 142* 145* 110* 133* 124*     Meds - nephronex, folic acid 1 mg/day, medium sliding scale insulin, lactulose BID  Stool -  1/25: 300 mL  1/24: x 3  1/23: 200 mL    Weight - Fluctuations likely with  fluid status 2/2 paracentesis and dialysis but do suspect some true weight loss as well given poor nutritional status.   Wt Readings from Last 10 Encounters:   01/26/23 86 kg (189 lb 9.5 oz)   01/21/23 82.4 kg (181 lb 11.2 oz)   12/28/22 96 kg (211 lb 10.3 oz)   12/16/22 100.2 kg (221 lb)   12/16/22 100.2 kg (221 lb)   12/15/22 87 kg (191 lb 12.8 oz)   10/07/19 137.5 kg (303 lb 3.2 oz)   10/04/19 131.5 kg (290 lb)   02/20/19 140.4 kg (309 lb 9.6 oz)   07/10/18 137.5 kg (303 lb 3.2 oz)         ASSESSED NUTRITION NEEDS:  Dosing Weight 82.4 kg   Estimated Energy Needs: 0738-6271 kcals (30-35 Kcal/Kg)  Justification: repletion and HD  Estimated Protein Needs: + grams protein (1.2-1.5+ g pro/Kg)  Justification: Repletion and dialysis  Estimated Fluid Needs: per provider pending fluid status        NEW FINDINGS:   Palliative care consulted for GOC discussion   Patient requested to stop dialysis short yesterday at 2 hours and had communicated that he wishes to stop dialysis completely     1/24: Paracentesis = 4.8 L of straw colored fluid was drained    Previous Goals:   EN + PO to meet % estimated needs within 3 days   Evaluation: Not met    Previous Nutrition Diagnosis:   Inadequate protein-energy intake related to decreased appetite and refusal of TF with stomach ache/nausea as evidenced by intake of PO and EN likely <50% for at least 3 days PTA  Evaluation: No change      CURRENT NUTRITION DIAGNOSIS  Inadequate protein-energy intake related to decreased appetite with early satiety/bloating/nausea and refusal of TF as evidenced by PO + EN intake meeting < 50% nutrition needs for the past week     INTERVENTIONS  Recommendations / Nutrition Prescription  Modify supplements -  4 oz Ensure with breakfast  Gelatein+ with lunch   Orange magic cup with dinner   -- may order additional supplements PRN    No change to TF orders today per patient request. Will continue to check in pending GOC discussion. Did recommend  change to nocturnal schedule if GOC are restorative which patient plans to think about.     If nocturnal TF cycle becomes POC, consider -   Novasource Renal @ 70 mL/hr x 12 hours (8 am - 8 pm) = 840 mL/day, 1680 kcal (20 kcal/kg), 76 g protein (0.9 g/kg), 154 g CHO, 602 ml free water and 0 g fiber daily   --> Meets ~70% minimum nutrition needs     Implementation  Medical Food Supplement - modify as above  Nutrition Education - Discussed nutrition POC and recommendations as above with patient and spouse at bedside.     Goals  EN + PO intake to meet % nutrition needs if GOC remain restorative       MONITORING AND EVALUATION:  Progress towards goals will be monitored and evaluated per protocol and Practice Guidelines      Sirena Gomez RD, LD

## 2023-01-26 NOTE — PROVIDER NOTIFICATION
"MD Notification    Notified Person: MD    Notified Person Name: Carol Garcia    Notification Date/Time: 1/25/23 @1357    Notification Interaction: [facility] webpage    Purpose of Notification: \"Was told from dialysis nurse that pt wanted to be done with dialysis early. Per pt, he wants to be done with all dialysis & is ready to stop cares altogether. He's tired of fighting. Is there any way you could go in there & have a discussion with pt & his wife for ongoing cares? Thanks.\"    Orders Received: MD consulted palliative      "

## 2023-01-26 NOTE — PROGRESS NOTES
Pt refused his neb treatment at 3 and 7 pm. His Vital signs are stable. breath sounds are diminished bilaterally .  RT will continue to follow.  Lisa Craig, RT  1/25/2023

## 2023-01-26 NOTE — CONSULTS
[facility]  Palliative Care Consultation Note    Patient: Blanco Osborne  Date of Admission:  1/21/2023    Requesting Clinician / Team: Medicine  Reason for consult: Goals of care    Recommendations:    Goals of Care (see in depth discussion below):  writer met with Blanco and his wife in the room; his brother to take Dylan was on the phone.  We discussed goals of care and Blanco states that he wants to continue dialysis as he understands he would likely pass away within 2 weeks and he  is not ready to die.  He states that he will agree to tube feedings as his goal is to get stronger.     Code status: Full Code    Advanced Care Planning:    Patient has a completed Health Care Directive: No.      Patient case was reviewed with Dr Garcia and RN.    Arlyn Cortes [facility]  Contact information available via [facility] Paging/Directory        Thank you for the opportunity to participate in the care of this patient and family. Our team: does not plan on following further, however do not hesitate to call or re-consult if we can be of further assistance to the patient/family.     During regular M-F work hours ([id number]) -- if you are not sure who specifically to contact -- please contact us on [facility] Smart Web.     After regular work hours and on weekends/holidays, you can call our answering service at [phone].     Attestation:  Total time on the floor involved in the patient's care: 60 minutes  Total time spent in counseling/care coordination: >50% discussing patient condition, meeting with patient and family for goals of care discussion assessment of symptoms, reviewing current status with MD and RN,     Assessments:  Blanco Osborne is a 57 year old male with PMH significant for 2016 liver transplant, DM2, CVA, HTN, A. fib on chronic anticoagulation, CHRISTIAN on BiPAP.  He presented to hospital on 1/21/2023 from [facility] for evaluation of loculated pleural  effusion.    Today, the patient was seen for:   Goals of care    I introduced palliative care services and our multidisciplinary team to patient and family. Explained that our service offers an extra layer of support for individuals who are experiencing serious, life threatening illnesses, which can include assistance with symptom management, emotional and spiritual support as well as complex medical decision making. Reviewed past medical history and patient/family understanding of the current medical situation.  Blanco states that he wishes to continue restorative focused care.  He does not want to stop dialysis as he does not want to die at this time.   when he previously had spoken of stopping dialysis he did not realize that he would only have short time to live-about 2 weeks at the most.  He wants to do everything he can to get stronger.  He will agree to tube feedings to support his nutrition.  Family is in agreement with this plan.  They felt that he gets confused at times and makes statements that he does not fully comprehend when he is frustrated.  Patient reiterates that he is not ready to die and wishes to continue treatments.    Prognosis, Goals, & Planning:        Prognosis, Goals, and/or Advance Care Planning were addressed today: Yes      Functional Status just prior to hospitalization: 4 (Completely disabled and dependent on others for selfcare; bedbound)          Patient has decision-making capacity today for complex decisions: Partial (needs assistance with complex decisions)      Patient's decision making preferences: shared with support from loved ones          I have concerns about the patient/family's health literacy today: No           Coping, Meaning, & Spirituality:   Mood, coping, and/or meaning in the context of serious illness were addressed today: Yes  Summary/Comments:     Social:     Living situation: Came from [facility]    [place] family / caregivers: Wife Ofe and brother  Dylan    History of Present Illness:   History gathered today from: patient, family/loved ones, medical chart, unit team members  Adopted from H&P:  Blanco Osborne is a 57 year old male with PMH significant for 2016 liver transplant, DM2, CVA, HTN, A. fib on chronic anticoagulation, CHRISTIAN on BiPAP, ESRD on HD.  He presented to hospital on 1/21/2023 from [facility] for evaluation of loculated pleural effusion with need for possible chest tube and lytics requiring higher level of care.  Patient has been hospitalized since November 2022 where he was treated for parainfluenza and strep pneumonia; he was extubated twice and a tracheostomy was performed during that hospitalization.  He was discharged to [facility] for continued complex medical care.    Key Palliative Symptom Data:  # Pain severity the last 12 hours: moderate  # Dyspnea severity the last 12 hours: low  # Nausea severity the last 12 hours: low  # Anxiety severity the last 12 hours: not assessed    Patient is on opioids: assessed and bowels ok/no needed changes to plan of care today.    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     NAFLD (nonalcoholic fatty liver disease) 2/11/2016     CHRISTIAN on CPAP      Parainfluenza type 1 infection 11/19/2022     SBP (spontaneous bacterial peritonitis) (H)     [facility]     Sepsis due to Streptococcus pneumoniae with acute hypoxic respiratory failure (H) 11/19/2022     Tubular adenoma 10/2019    Large cecal adenoma- due for surveillance colonoscopy in 3 years (10/2022)        Past Surgical History:  Past Surgical History:   Procedure Laterality Date     APPENDECTOMY        BENCH LIVER N/A 2016    Procedure: BENCH LIVER;  Surgeon: Enoc Crews MD;  Location: [facility] OR     BRONCHOSCOPY FLEXIBLE AND RIGID N/A 2022    Procedure: BRONCHOSCOPY;  Surgeon: Alena Valenzuela MD;  Location:  GI     COLONOSCOPY  13    repeat in 2018     COLONOSCOPY N/A 10/4/2019    Procedure: COLONOSCOPY, WITH POLYPECTOMY AND BIOPSY;  Surgeon: Go Chong MD;  Location: [facility] OR     HERNIA REPAIR       IR CHEST TUBE PLACEMENT NON-TUNNELED RIGHT  2022     IR CVC TUNNEL PLACEMENT > 5 YRS OF AGE  2022     IR GASTROSTOMY TUBE PERCUTANEOUS PLCMNT  2022     IR PARACENTESIS  2022     IR PARACENTESIS  2022     IR THORACENTESIS  2022     IR TRANSCATHETER BIOPSY  2022     TRACHEOSTOMY N/A 2022    Procedure: TRACHEOSTOMY;  Surgeon: Nilesh Jackson MD;  Location:  OR     TRANSPLANT LIVER RECIPIENT  DONOR N/A 2016    Procedure: TRANSPLANT LIVER RECIPIENT  DONOR;  Surgeon: Enoc Crews MD;  Location: [facility] OR         Family History:  No family history on file.      Allergies:  No Known Allergies     Medications:  I have reviewed this patient's medication profile and medications from this hospitalization.     Noted scheduled meds are:    - MEDICATION INSTRUCTIONS for Dialysis Patients -   Does not apply See Admin Instructions     acetylcysteine  2 mL Nebulization BID     apixaban ANTICOAGULANT  5 mg Oral BID     B and C vitamin Complex with folic acid  5 mL Oral or Feeding Tube Daily     folic acid  1 mg Oral or Feeding Tube Daily     insulin aspart  1-7 Units Subcutaneous TID AC     insulin aspart  1-5 Units Subcutaneous At Bedtime     [Held by provider] insulin glargine  5 Units Subcutaneous QAM AC     ipratropium - albuterol 0.5 mg/2.5 mg/3 mL  3 mL Nebulization 4x daily     lacosamide  100 mg Oral or Feeding Tube BID     lactulose  20 g Oral BID     levETIRAcetam  1,000 mg Oral or Feeding Tube Q24H     lidocaine   1 patch Transdermal Q24H     lidocaine   Transdermal Q8H BIJU     midodrine  5 mg Oral or Feeding Tube TID w/meals     mineral oil-hydrophilic petrolatum   Topical Daily     nystatin  500,000 Units Swish & Spit 4x Daily     pantoprazole  40 mg Oral or Feeding Tube BID AC     rifaximin  550 mg Oral or Feeding Tube BID     sodium chloride (PF)  10-30 mL Intracatheter Q8H     sodium chloride (PF)  3 mL Intracatheter Q8H     tacrolimus  1 mg Oral BID IS     traZODone  25 mg Oral or Feeding Tube At Bedtime    Or     traZODone  50 mg Oral or Feeding Tube At Bedtime       Noted PRN meds are:  acetaminophen **OR** acetaminophen, albuterol, calcium carbonate, artificial tears, glucose **OR** dextrose **OR** glucagon, hydrOXYzine, lidocaine 4%, lidocaine (buffered or not buffered), melatonin, mineral oil-hydrophilic petrolatum, naloxone **OR** naloxone **OR** naloxone **OR** naloxone, nitroGLYcerin, ondansetron **OR** ondansetron, simethicone, sodium chloride (PF)    Physical Exam:  Vital Signs: Temp: 98.3  F (36.8  C) Temp src: Oral BP: 97/58 Pulse: 100   Resp: 24 SpO2: 96 % O2 Device: None (Room air)    Weight: 189 lbs 9.53 oz    Physical Exam  GENERAL:  Alert, fatigued, no distress, cachectic, weak, appears chronically ill, jaundiced.  HEAD: Normocephalic atraumatic  SCLERA: Anicteric  EXTREMITIES: Warm; no edema  ABDOMEN:  Distended, ascites.  RESPIRATORY: Breathing non labored  NEUROLOGIC: Lethargic.  PSYCH: Calm.    Data reviewed:  Recent imaging reviewed.  Results for orders placed or performed during the hospital encounter of 01/21/23   CT Chest w/o Contrast    Impression    IMPRESSION:   1.  Small right and minimal left effusions with moderately extensive pleural thickening bilaterally. Effusions are similar to previous.  2.  Mild probable atelectasis versus less likely infiltrate in both lungs. This is similar to previous.       US Paracentesis without Albumin    Impression    IMPRESSION: Technically successful  paracentesis without immediate  complications.    HANS WESTON MD         SYSTEM ID:  [id number]          Lab Results   Component Value Date    WBC 10.7 01/25/2023    WBC 9.5 01/24/2023    WBC 7.6 01/23/2023    HGB 8.5 (L) 01/25/2023    HGB 7.9 (L) 01/24/2023    HGB 7.6 (L) 01/23/2023    HCT 29.1 (L) 01/25/2023    HCT 27.0 (L) 01/24/2023    HCT 25.8 (L) 01/23/2023     01/25/2023     01/24/2023     01/23/2023     01/25/2023     01/24/2023     01/23/2023    POTASSIUM 4.2 01/25/2023    POTASSIUM 3.9 01/24/2023    POTASSIUM 4.0 01/23/2023    CHLORIDE 96 (L) 01/25/2023    CHLORIDE 99 01/24/2023    CHLORIDE 94 (L) 01/23/2023    CO2 32 (H) 01/25/2023    CO2 31 (H) 01/24/2023    CO2 30 (H) 01/23/2023    BUN 35.3 (H) 01/25/2023    BUN 27.0 (H) 01/24/2023    BUN 43.7 (H) 01/23/2023    CR 3.26 (H) 01/25/2023    CR 2.49 (H) 01/24/2023    CR 3.48 (H) 01/23/2023     (H) 01/26/2023     (H) 01/26/2023     (H) 01/25/2023    AST 24 01/22/2023    AST 20 01/19/2023    AST 23 01/14/2023    ALT 9 (L) 01/22/2023    ALT 8 (L) 01/19/2023    ALT 11 01/14/2023    ALKPHOS 260 (H) 01/22/2023    ALKPHOS 236 (H) 01/19/2023    ALKPHOS 250 (H) 01/14/2023    BILITOTAL 0.5 01/22/2023    BILITOTAL 0.5 01/19/2023    BILITOTAL 0.7 01/14/2023    CHANDLER 50 01/19/2023    CHANDLER 32 12/20/2022    CHANDLER 38 12/18/2022    INR 1.36 (H) 12/21/2022    INR 1.14 12/16/2022    INR 1.58 (H) 12/16/2022      Lab Results   Component Value Date    ALBUMIN 1.9 01/23/2023    ALBUMIN 1.8 12/29/2022    ALBUMIN 3.9 04/20/2020

## 2023-01-27 ENCOUNTER — APPOINTMENT (OUTPATIENT)
Dept: PHYSICAL THERAPY | Facility: CLINIC | Age: 58
DRG: 981 | End: 2023-01-27
Attending: STUDENT IN AN ORGANIZED HEALTH CARE EDUCATION/TRAINING PROGRAM
Payer: COMMERCIAL

## 2023-01-27 LAB
GLUCOSE BLDC GLUCOMTR-MCNC: 113 MG/DL (ref 70–99)
GLUCOSE BLDC GLUCOMTR-MCNC: 114 MG/DL (ref 70–99)
GLUCOSE BLDC GLUCOMTR-MCNC: 115 MG/DL (ref 70–99)
GLUCOSE BLDC GLUCOMTR-MCNC: 119 MG/DL (ref 70–99)
GLUCOSE BLDC GLUCOMTR-MCNC: 158 MG/DL (ref 70–99)

## 2023-01-27 PROCEDURE — 634N000001 HC RX 634: Performed by: INTERNAL MEDICINE

## 2023-01-27 PROCEDURE — 90935 HEMODIALYSIS ONE EVALUATION: CPT | Performed by: INTERNAL MEDICINE

## 2023-01-27 PROCEDURE — 250N000013 HC RX MED GY IP 250 OP 250 PS 637: Performed by: INTERNAL MEDICINE

## 2023-01-27 PROCEDURE — 999N000157 HC STATISTIC RCP TIME EA 10 MIN

## 2023-01-27 PROCEDURE — 250N000013 HC RX MED GY IP 250 OP 250 PS 637

## 2023-01-27 PROCEDURE — 250N000012 HC RX MED GY IP 250 OP 636 PS 637: Performed by: STUDENT IN AN ORGANIZED HEALTH CARE EDUCATION/TRAINING PROGRAM

## 2023-01-27 PROCEDURE — 94640 AIRWAY INHALATION TREATMENT: CPT

## 2023-01-27 PROCEDURE — 250N000013 HC RX MED GY IP 250 OP 250 PS 637: Performed by: STUDENT IN AN ORGANIZED HEALTH CARE EDUCATION/TRAINING PROGRAM

## 2023-01-27 PROCEDURE — 97530 THERAPEUTIC ACTIVITIES: CPT | Mod: GP

## 2023-01-27 PROCEDURE — 94640 AIRWAY INHALATION TREATMENT: CPT | Mod: 76

## 2023-01-27 PROCEDURE — 90937 HEMODIALYSIS REPEATED EVAL: CPT

## 2023-01-27 PROCEDURE — 250N000009 HC RX 250: Performed by: STUDENT IN AN ORGANIZED HEALTH CARE EDUCATION/TRAINING PROGRAM

## 2023-01-27 PROCEDURE — 99233 SBSQ HOSP IP/OBS HIGH 50: CPT | Performed by: INTERNAL MEDICINE

## 2023-01-27 PROCEDURE — 120N000001 HC R&B MED SURG/OB

## 2023-01-27 RX ORDER — ALBUMIN (HUMAN) 12.5 G/50ML
50 SOLUTION INTRAVENOUS
Status: DISCONTINUED | OUTPATIENT
Start: 2023-01-27 | End: 2023-01-29

## 2023-01-27 RX ORDER — MIDODRINE HYDROCHLORIDE 10 MG/1
10 TABLET ORAL
Status: DISCONTINUED | OUTPATIENT
Start: 2023-01-27 | End: 2023-04-28 | Stop reason: HOSPADM

## 2023-01-27 RX ADMIN — IPRATROPIUM BROMIDE AND ALBUTEROL SULFATE 3 ML: 2.5; .5 SOLUTION RESPIRATORY (INHALATION) at 19:59

## 2023-01-27 RX ADMIN — RIFAXIMIN 550 MG: 550 TABLET ORAL at 21:23

## 2023-01-27 RX ADMIN — NYSTATIN 500000 UNITS: 100000 SUSPENSION ORAL at 08:14

## 2023-01-27 RX ADMIN — FOLIC ACID 1 MG: 1 TABLET ORAL at 08:14

## 2023-01-27 RX ADMIN — ACETYLCYSTEINE 2 ML: 200 SOLUTION ORAL; RESPIRATORY (INHALATION) at 07:59

## 2023-01-27 RX ADMIN — Medication 5 ML: at 08:28

## 2023-01-27 RX ADMIN — LIDOCAINE 1 PATCH: 560 PATCH PERCUTANEOUS; TOPICAL; TRANSDERMAL at 08:29

## 2023-01-27 RX ADMIN — MIDODRINE HYDROCHLORIDE 10 MG: 5 TABLET ORAL at 18:16

## 2023-01-27 RX ADMIN — WHITE PETROLATUM: 1.75 OINTMENT TOPICAL at 08:56

## 2023-01-27 RX ADMIN — TRAZODONE HYDROCHLORIDE 50 MG: 50 TABLET ORAL at 21:23

## 2023-01-27 RX ADMIN — APIXABAN 5 MG: 5 TABLET, FILM COATED ORAL at 21:23

## 2023-01-27 RX ADMIN — TACROLIMUS 1 MG: 5 CAPSULE ORAL at 18:17

## 2023-01-27 RX ADMIN — Medication 2.5 MG: at 11:21

## 2023-01-27 RX ADMIN — Medication 40 MG: at 15:44

## 2023-01-27 RX ADMIN — LEVETIRACETAM 1000 MG: 100 SOLUTION ORAL at 21:23

## 2023-01-27 RX ADMIN — IPRATROPIUM BROMIDE AND ALBUTEROL SULFATE 3 ML: 2.5; .5 SOLUTION RESPIRATORY (INHALATION) at 08:00

## 2023-01-27 RX ADMIN — APIXABAN 5 MG: 5 TABLET, FILM COATED ORAL at 08:14

## 2023-01-27 RX ADMIN — EPOETIN ALFA-EPBX 13000 UNITS: 10000 INJECTION, SOLUTION INTRAVENOUS; SUBCUTANEOUS at 13:08

## 2023-01-27 RX ADMIN — HYDROXYZINE HYDROCHLORIDE 25 MG: 10 SYRUP ORAL at 18:16

## 2023-01-27 RX ADMIN — LACOSAMIDE 100 MG: 10 SOLUTION ORAL at 21:23

## 2023-01-27 RX ADMIN — NYSTATIN 500000 UNITS: 100000 SUSPENSION ORAL at 18:16

## 2023-01-27 RX ADMIN — RIFAXIMIN 550 MG: 550 TABLET ORAL at 08:14

## 2023-01-27 RX ADMIN — ACETYLCYSTEINE 2 ML: 200 SOLUTION ORAL; RESPIRATORY (INHALATION) at 19:59

## 2023-01-27 RX ADMIN — LACOSAMIDE 100 MG: 10 SOLUTION ORAL at 15:44

## 2023-01-27 RX ADMIN — TACROLIMUS 1 MG: 5 CAPSULE ORAL at 08:28

## 2023-01-27 RX ADMIN — Medication 2.5 MG: at 18:59

## 2023-01-27 RX ADMIN — MIDODRINE HYDROCHLORIDE 5 MG: 5 TABLET ORAL at 08:14

## 2023-01-27 RX ADMIN — ACETAMINOPHEN 650 MG: 325 TABLET, FILM COATED ORAL at 08:51

## 2023-01-27 RX ADMIN — Medication 40 MG: at 08:28

## 2023-01-27 RX ADMIN — LACTULOSE 20 G: 10 SOLUTION ORAL at 08:14

## 2023-01-27 ASSESSMENT — ACTIVITIES OF DAILY LIVING (ADL)
ADLS_ACUITY_SCORE: 64
ADLS_ACUITY_SCORE: 62
ADLS_ACUITY_SCORE: 64
ADLS_ACUITY_SCORE: 64
ADLS_ACUITY_SCORE: 60
ADLS_ACUITY_SCORE: 64
ADLS_ACUITY_SCORE: 62

## 2023-01-27 NOTE — PROGRESS NOTES
Renal Medicine Progress Note            Assessment/Plan:     # ESKD:  ATN on top of CKD without recovery.  He has been on chronic HD MWF via R internal jugular CVC.  He runs for 3 H with removal of 1-2 KG as BP allows. BP supported with midodrine.      # Loculated Pleural effusions.  Snall on R and minimal on L.       # Anemia:  He has been on EPO 40K weekly.  Getting 13K units with HD while in-patient.     # Chronic Hypotension: BP supported with midodrine.       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  Will check PTH and Vit D     # Ascites s/p paracentesis 4.8 liters 1/24.     Plan:  #  Increase midodrine to 10 mg tid and can take 10 mg extra mid HD tx.  # Next HD tx is on Monday        Interval History:     He saw and evaluated him while he is getting HD tx.  He had cramps ~ 1 hrs left of treatment.  He wanted to come off early again today.   I was able to convinced him to finish his treatment.             Medications and Allergies:       - MEDICATION INSTRUCTIONS for Dialysis Patients -   Does not apply See Admin Instructions     sodium chloride 0.9%  250 mL Intravenous Once in dialysis/CRRT     sodium chloride 0.9%  300 mL Hemodialysis Machine Once     acetylcysteine  2 mL Nebulization BID     apixaban ANTICOAGULANT  5 mg Oral BID     B and C vitamin Complex with folic acid  5 mL Oral or Feeding Tube Daily     folic acid  1 mg Oral or Feeding Tube Daily     sodium chloride (PF) 0.9%  10 mL Intracatheter Once in dialysis/CRRT    Followed by     heparin  1.3-2.6 mL Intracatheter Once in dialysis/CRRT     sodium chloride (PF) 0.9%  10 mL Intracatheter Once in dialysis/CRRT    Followed by     heparin  1.3-2.6 mL Intracatheter Once in dialysis/CRRT     insulin aspart  1-7 Units Subcutaneous TID AC     insulin aspart  1-5 Units Subcutaneous At Bedtime     [Held by provider] insulin glargine  5 Units Subcutaneous QAM AC     ipratropium - albuterol 0.5 mg/2.5 mg/3 mL  3 mL Nebulization 4x daily     lacosamide  100 mg Oral or Feeding Tube  BID     [Held by provider] lactulose  20 g Oral BID     levETIRAcetam  1,000 mg Oral or Feeding Tube Q24H     lidocaine  1 patch Transdermal Q24H     lidocaine   Transdermal Q8H BIJU     midodrine  5 mg Oral or Feeding Tube TID w/meals     mineral oil-hydrophilic petrolatum   Topical Daily     - MEDICATION INSTRUCTIONS -   Does not apply Once     nystatin  500,000 Units Swish & Spit 4x Daily     pantoprazole  40 mg Oral or Feeding Tube BID AC     rifaximin  550 mg Oral or Feeding Tube BID     sodium chloride (PF)  10-30 mL Intracatheter Q8H     sodium chloride (PF)  3 mL Intracatheter Q8H     tacrolimus  1 mg Oral BID IS     traZODone  25 mg Oral or Feeding Tube At Bedtime    Or     traZODone  50 mg Oral or Feeding Tube At Bedtime      No Known Allergies         Physical Exam:   Vitals were reviewed   , Blood pressure 98/68, pulse 102, temperature 97.6  F (36.4  C), temperature source Oral, resp. rate 17, weight 86 kg (189 lb 9.5 oz), SpO2 96 %.    Wt Readings from Last 3 Encounters:   01/26/23 86 kg (189 lb 9.5 oz)   01/21/23 82.4 kg (181 lb 11.2 oz)   12/28/22 96 kg (211 lb 10.3 oz)       Intake/Output Summary (Last 24 hours) at 1/27/2023 1324  Last data filed at 1/27/2023 0900  Gross per 24 hour   Intake 1045 ml   Output 1200 ml   Net -155 ml       GENERAL APPEARANCE: Chronically ill. Frail.  RESP: lungs cta b c good efforts, no crackles, rhonchi or wheezes  CV: RRR, nl S1/S2  ABDOMEN: Distended, soft, ND  EXTREMITIES/SKIN: no rashes/lesions on observed skin; no edema  NEURO: Awake, alert and asking questions  PSYCH: Depressed.   + rectal tube         Data:     CBC RESULTS:     Recent Labs   Lab 01/25/23  0522 01/24/23  0715 01/23/23  0638 01/22/23  0534   WBC 10.7 9.5 7.6 7.0   RBC 2.91* 2.75* 2.63* 2.88*   HGB 8.5* 7.9* 7.6* 8.4*   HCT 29.1* 27.0* 25.8* 28.6*    209 207 231       Basic Metabolic Panel:  Recent Labs   Lab 01/27/23  0751 01/26/23  2137 01/26/23  1213 01/26/23  0848 01/26/23  0225  01/25/23  2150 01/25/23  1643 01/25/23  0522 01/24/23  1129 01/24/23  0715 01/23/23  0743 01/23/23  0638 01/22/23  2208 01/22/23  1407 01/22/23  1147 01/22/23  0528   NA  --   --   --   --   --   --   --  139  --  137  --  136  --  135*  --  136   POTASSIUM  --   --   --   --   --   --   --  4.2  --  3.9  --  4.0  --  4.0  --  3.8   CHLORIDE  --   --   --   --   --   --   --  96*  --  99  --  94*  --  96*  --  94*   CO2  --   --   --   --   --   --   --  32*  --  31*  --  30*  --  26  --  29   BUN  --   --   --   --   --   --   --  35.3*  --  27.0*  --  43.7*  --  35.0*  --  33.6*   CR  --   --   --   --   --   --   --  3.26*  --  2.49*  --  3.48*  --  2.92*  --  2.68*   * 151* 134* 132* 142* 145*   < > 133*   < > 92   < > 93   < > 119*   < > 90   KELLY  --   --   --   --   --   --   --  9.4  --  8.8  --  9.2  --  9.2  --  9.1    < > = values in this interval not displayed.       INRNo lab results found in last 7 days.   Attestation:   I have reviewed today's relevant vital signs, notes, medications, labs and imaging.    Zenon Bradshaw MD  [facility] - Nephrology  Office phone :[phone]  Pager: [phone]

## 2023-01-27 NOTE — PROGRESS NOTES
[facility]    Hospitalist Progress Note    Assessment & Plan   Blanco Osborne is a 57 year old male admitted on 1/21/2023 as a transfer from [facility] for evaluation of loculated pleural effusion. Patient has multiple medical problems including history of liver transplant 2016 due to alcohol abuse, pAF on chronic anticoagulation, history of DM2, CVA, HTN, CHRISTIAN on BiPAP. 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 Discharge to [facility], back to [facility] and back to [facility] during month of December.     Recently while at [facility] there were concerns for worsening effusions. He had thoracentesis 01/13 which returned exudative without growth on cultures. CT chest/abdomen/pelvis on 01/18 demonstrated worsening effusions and concerns for infected parapneumonic effusions with possible SBP.  CT findings discussed with Pulmonology team with 3 different pulmonologist including (Dr. Monge, Dr. Chu and Dr. Jay) with agreement that patient needs chest tube and possible lytics and therefore transfer to higher level of care.      See discharge summary 01/21/2023 for more details of his recent hx.      Bilateral pleural effusions, ? empyema  Acute now chronic respiratory failure requiring tracheostomy  - resolved    Pneumonia  - resolved   ARDS  - resolved    Right pneumothorax - resolved   Initial event was parainfluenza and strep pneumo pneumonia back in November 2022. Treated for ARDS. Had failed extubation x2 and tracheostomy performed last hospitalization.  Had additional complications of bilateral pneumothoraces as well as hydropneumothorax- pleural fluid grew candida parapsilosis. He was treated with IV micafungin and currently on fluconazole, to finish 4-week course. Chest tube was placed and subsequently removed. While in  [facility] he was successfully weaned from the ventilator. His respiratory status is now stable on room air.   * Most recently, there has been concern for right complicated parapneumonic effusion. S/p thoracentesis 11/26 and 1/13.  Right thoracentesis 1/13 consistent with exudative effusion with elevated LDH, high neutrophils, cultures show NGTD. CT on 1/18 showed small bilateral partially loculated pleural effusions with associated pleural enhancement consistent with infected effusions. Pulmonary consultants at [facility] have recommended transfer to [facility] for consideration of chest tube placement and possible intra-pleural lytics. Dr. Howe with thoracic surgery consulted prior to transfer.   - CT chest ordered by Dr. Jackson 1/22, small effusions on imaging and no plans for a chest tube. Can consider a diagnostic thoracentesis however unclear utility. Patient had one at [facility] 01/13 which is exudative but cultures have been NGTD. Currently no s/s of worsening infection only elevated inflammatory markers. ID currently following, appreciate their input regarding need for thoracentesis and not starting antibiotics.  -Completed 4-week treatment for Candida.  - Continue mucomyst, albuterol and duonebs   - Tracheostomy care per RT  -Discussed with social work team to look into [facility] for transfer, patient can be transferred as early as 1/25.  1/25, nursing and mentioned that patient had refused to undergo dialysis and want to stop care, palliative care was consulted, on 1/26 had an extensive discussion with the patient and spouse in the room and they denied any wish to stop care or going to comfort measures, they are still full code and wished to pursue full care.  This was discussed with palliative care as well.  1/27 patient has a rectal tube and has lots of diarrhea, I did place lactulose on hold for now, will start it back depending on stool output, might be we could remove the rectal tube, currently we are waiting for  input from [facility] whether they would take him back or not,  his needs have come down since he was not receiving any tube feeding here and also he is not having any oxygen through his trach.  S/p Liver transplant 2016   Cirrhosis with ascites  Subacute bacterial peritonitis  Main manifestations of this are hepatic encephalopathy and ascites.  Hepatologist at [facility] felt that most likely reason for the cirrhosis was metabolic syndrome or alcohol intake.  There was no evidence of rejection on biopsy and there was some evidence of regeneration. Paracentesis done on 12/22/2022 showed lactobacillus indicating SBP, treated with Unasyn and Augmentin, finished 2-week course 1/12/2023.  Requires large-volume paracentesis periodically for recurring ascites.    * Paracentesis 1/13/2023 yielded about 7500 cc. Cultures NGTD.    * Repeat CT abdomen/pelvis on 1/18 concerning for SBP given peritoneal enhancement.  * Repeat labs 1/19:  procalcitonin 1.64 from 1.81.  Ammonia 32.   -Due to abdominal distention will order a therapeutic paracentesis, will also order cell counts on it to rule out SBP but the patient did not have any symptoms of SBP.  - Continue intermittent paracentesis as needed if develops symptomatic ascites.    - Further treatment for recurrent ascites with TIPS deferred to his Liver Transplant team and/or Hepatology, he has an appointment on 4/21/2023 with Hepatology, Dr. Simms  - Continue Tacrolimus 1 mg BID.  - Continue Lactulose and Rifaximin , lactulose has been on hold since 1/27 due to increased diarrhea, will have to start it back possibly once a day dose depending on stool output.  -Patient underwent another paracentesis on 1/25 with a 4.8 L removed, Patient does have fullness after eating.     Acute encephalopathy  Patient AOx4 this morning but drowsy and lethargic. Suspect multifactorial related to cirrhosis and medications. He receive Ambien last night for sleep  - place lidocaine patch to  limit narcotics, Ambien has been stopped,  -Acute metabolic encephalopathy. is completely resolved, patient is alert oriented x3.     Diarrhea  Liquid stool requiring rectal tube. Patient transferred with this the rectal tube and unclear how long the liquid diarrhea has been going on. Foul smelling  - C. difficile negative, diarrhea present, first thought to initiate Imodium but later realized that we are giving him lactulose 20 mg twice daily, currently lactulose is on hold since 1/27, will have to start it back depending on the stool output since patient has underlying cirrhosis.     Paroxysmal atrial fibrillation  Remains in sinus rhythm.  On anticoagulation with apixaban indefinitely for stroke prophylaxis.    -Apixaban was on hold, will restart that.  - Cardiac monitoring       ESRD: Now on iHD.  No return of renal function.  - MWF schedule   - Nephrology consulted, patient is undergoing dialysis here as per schedule.     Acute anemia  Pt has required intermittent transfusions for on-going anemia.   -Anemia most likely secondary to critical illness/chronic disease, chronic renal disease  -Transfuse packed red blood cells to keep Hb> 7  -Monitor H&H.     History PEA arrest   PEA arrest prior to his previous admission and 1 episode of PEA associated with desaturation on 12/20.  No structural cardiac disease.   - Continue Cardiac Monitoring     Hypotension  Also has developed baseline hypotension 2/2 cirrhosis and prolonged critical illness.   -Continue current midodrine dose       Seizure after hypoxic event.  This is in the context of baseline multifactorial encephalopathy secondary to his ongoing critical illness and prior cardiac arrests and prior strokes and cirrhosis with hepatic encephalopathy. MRI of head of 12/20/22 reveals no PRES or leptomeningeal enhancement but scattered.  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   - Continue with  Keppra and  Vimpat       Diabetes mellitus type 2  -Lantus decreased to 5 units daily on 1/19, hold this for now due to low PO intake  -MDSSI  -Hypoglycemic protocol in place     Moderate malnutrition, protein and calorie type.  -Continue with tube feeds via PEG tube, patient refusing to eat or have TF restarted. He reports ongoing nausea and distention.  Calorie count has been initiated, discussed that with family and discussed that with patient in detail.  He would need at least bolus feeding with poor calorie counts, patient is refusing bolus feeding and tube feeding as such.  - Nutritionist consulted and following  Underwent another paracentesis on 1/24 with almost 5 L removed.      DVT Prophylaxis: DOAC's  Code Status: Full Code     55 MINUTES SPENT BY ME on the date of service doing chart review, history, exam, documentation & further activities per the note.  Disposition: Expected discharge 1/25-hour after depending on when he would have a bed available at [facility] or by the staff.     Clinically Significant Risk Factors              # Hypoalbuminemia: Lowest albumin = 1.9 g/dL at 1/23/2023  6:38 AM, will monitor as appropriate            # Severe Malnutrition: based on nutrition assessment        Carol Garcia MD  Text Page   (7am to 6pm)    Interval History   Patient had dialysis earlier today, I had talked to social work about his plan of care, patient came here from [place] and family wanted to try [facility], not sure [facility] would accept him since his needs have come down, but [facility] [place] is  going to let us know if they would accept him back either way my guess is that this would happen only on Monday, currently he is refusing tube feeding, he is undergoing dialysis and he has a rectal tube for ongoing diarrhea.  Lactulose was held starting today need to evaluate and restarted depending on the stool output, with his underlying cirrhosis he will need to have at least a 2-3 bowel movements per day.  Patient is quite  debilitated and will minimum need TCU versus long-term care if he cannot go into long-term acute care facility.  .-Data reviewed today: I reviewed all new labs and imaging results over the last 24 hours.    Physical Exam     Vital Signs with Ranges  Temp:  [97.2  F (36.2  C)-98.2  F (36.8  C)] 97.6  F (36.4  C)  Pulse:  [] 102  Resp:  [13-36] 17  BP: ()/(26-82) 98/68  SpO2:  [94 %-100 %] 96 %  I/O last 3 completed shifts:  In: 675 [P.O.:360; NG/GT:315]  Out: 1200 [Stool:1200]    Constitutional: Awake, alert, cooperative, no apparent distress  Respiratory: Clear to auscultation bilaterally, no crackles or wheezing  Cardiovascular: Regular rate and rhythm, normal S1 and S2, and no murmur noted  GI: Abdomen continues to be distended, bowel sounds present, ascites have improved.  Skin/Integumen: No rashes, no cyanosis, no edema  Neuro : moving all 4 extremities, no focal deficit noted     Medications       - MEDICATION INSTRUCTIONS for Dialysis Patients -   Does not apply See Admin Instructions     sodium chloride 0.9%  250 mL Intravenous Once in dialysis/CRRT     sodium chloride 0.9%  300 mL Hemodialysis Machine Once     acetylcysteine  2 mL Nebulization BID     apixaban ANTICOAGULANT  5 mg Oral BID     B and C vitamin Complex with folic acid  5 mL Oral or Feeding Tube Daily     folic acid  1 mg Oral or Feeding Tube Daily     sodium chloride (PF) 0.9%  10 mL Intracatheter Once in dialysis/CRRT    Followed by     heparin  1.3-2.6 mL Intracatheter Once in dialysis/CRRT     sodium chloride (PF) 0.9%  10 mL Intracatheter Once in dialysis/CRRT    Followed by     heparin  1.3-2.6 mL Intracatheter Once in dialysis/CRRT     insulin aspart  1-7 Units Subcutaneous TID AC     insulin aspart  1-5 Units Subcutaneous At Bedtime     [Held by provider] insulin glargine  5 Units Subcutaneous QAM AC     ipratropium - albuterol 0.5 mg/2.5 mg/3 mL  3 mL Nebulization 4x daily     lacosamide  100 mg Oral or Feeding Tube BID      [Held by provider] lactulose  20 g Oral BID     levETIRAcetam  1,000 mg Oral or Feeding Tube Q24H     lidocaine  1 patch Transdermal Q24H     lidocaine   Transdermal Q8H BIJU     midodrine  10 mg Oral or Feeding Tube TID w/meals     mineral oil-hydrophilic petrolatum   Topical Daily     - MEDICATION INSTRUCTIONS -   Does not apply Once     nystatin  500,000 Units Swish & Spit 4x Daily     pantoprazole  40 mg Oral or Feeding Tube BID AC     rifaximin  550 mg Oral or Feeding Tube BID     sodium chloride (PF)  10-30 mL Intracatheter Q8H     sodium chloride (PF)  3 mL Intracatheter Q8H     tacrolimus  1 mg Oral BID IS     traZODone  25 mg Oral or Feeding Tube At Bedtime    Or     traZODone  50 mg Oral or Feeding Tube At Bedtime       Data   Recent Labs   Lab 01/27/23  0751 01/26/23  2137 01/26/23  1213 01/25/23  1643 01/25/23  0522 01/24/23  1129 01/24/23  0715 01/23/23  0743 01/23/23  0638 01/22/23  0534 01/22/23  0528   WBC  --   --   --   --  10.7  --  9.5  --  7.6   < >  --    HGB  --   --   --   --  8.5*  --  7.9*  --  7.6*   < >  --    MCV  --   --   --   --  100  --  98  --  98   < >  --    PLT  --   --   --   --  230  --  209  --  207   < >  --    NA  --   --   --   --  139  --  137  --  136   < > 136   POTASSIUM  --   --   --   --  4.2  --  3.9  --  4.0   < > 3.8   CHLORIDE  --   --   --   --  96*  --  99  --  94*   < > 94*   CO2  --   --   --   --  32*  --  31*  --  30*   < > 29   BUN  --   --   --   --  35.3*  --  27.0*  --  43.7*   < > 33.6*   CR  --   --   --   --  3.26*  --  2.49*  --  3.48*   < > 2.68*   ANIONGAP  --   --   --   --  11  --  7  --  12   < > 13   KELLY  --   --   --   --  9.4  --  8.8  --  9.2   < > 9.1   * 151* 134*   < > 133*   < > 92   < > 93   < > 90   ALBUMIN  --   --   --   --   --   --   --   --  1.9*  --  2.1*   PROTTOTAL  --   --   --   --   --   --   --   --   --   --  6.4   BILITOTAL  --   --   --   --   --   --   --   --   --   --  0.5   ALKPHOS  --   --   --   --   --   --    --   --   --   --  260*   ALT  --   --   --   --   --   --   --   --   --   --  9*   AST  --   --   --   --   --   --   --   --   --   --  24    < > = values in this interval not displayed.     Recent Labs   Lab 01/27/23  0751 01/26/23  2137 01/26/23  1213 01/26/23  0848 01/26/23  0225   * 151* 134* 132* 142*       Imaging:   No results found for this or any previous visit (from the past 24 hour(s)).

## 2023-01-27 NOTE — PROGRESS NOTES
Potassium   Date Value Ref Range Status   01/25/2023 4.2 3.4 - 5.3 mmol/L Final   12/29/2022 3.4 3.4 - 5.3 mmol/L Final   04/20/2020 3.9 3.4 - 5.3 mmol/L Final     Potassium POCT   Date Value Ref Range Status   01/19/2023 3.6 3.5 - 5.0 mmol/L Final     Hemoglobin   Date Value Ref Range Status   01/25/2023 8.5 (L) 13.3 - 17.7 g/dL Final   04/20/2020 14.3 13.3 - 17.7 g/dL Final     Creatinine   Date Value Ref Range Status   01/25/2023 3.26 (H) 0.67 - 1.17 mg/dL Final   04/20/2020 1.06 0.66 - 1.25 mg/dL Final     Urea Nitrogen   Date Value Ref Range Status   01/25/2023 35.3 (H) 6.0 - 20.0 mg/dL Final   12/29/2022 46 (H) 7 - 30 mg/dL Final   04/20/2020 23 7 - 30 mg/dL Final     Sodium   Date Value Ref Range Status   01/25/2023 139 136 - 145 mmol/L Final   04/20/2020 139 133 - 144 mmol/L Final     INR   Date Value Ref Range Status   12/21/2022 1.36 (H) 0.85 - 1.15 Final   10/07/2019 1.20 (H) 0.86 - 1.14 Final       DIALYSIS PROCEDURE NOTE  Hepatitis status of previous patient on machine log was checked and verified ok to use with this patients hepatitis status.  Patient dialyzed for 3 hrs. on a K3 bath with a net fluid removal of  1L.  A BFR of 350 ml/min was obtained via a CVC.      The treatment plan was discussed with Dr. Bradshaw during the treatment.    Total heparin received during the treatment: 0 units.     Line flushed, clamped and capped with heparin 1:1000 1.9 mL (1900 units) per lumen    Meds  given: 80109 units epogen  Complications: hypotension, pain: resolved      Person educated: Pre-tx. Knowledge base Minimal. Barriers to learning: none. Educated on procedure via verbal mode. Patient verbalized understanding. Pt prefers verbal education style.     ICEBOAT? Timeout performed pre-treatment  I: Patient was identified using 2 identifiers  C:  Consent Signed Yes  E: Equipment preventative maintenance is current and dialysis delivery system OK to use  B: Hepatitis B Surface Antigen: negative; Draw Date:  01/12/2023      Hepatitis B Surface Antibody: suseptible; Draw Date: 12/10/2022  O: Dialysis orders present and complete prior to treatment  A: Vascular access verified and assessed prior to treatment  T: Treatment was performed at a clinically appropriate time  ?: Patient was allowed to ask questions and address concerns prior to treatment  See Adult Hemodialysis flowsheet in [facility] for further details and post assessment.  Machine water alarm in place and functioning. Transducer pods intact and checked every 15min.   Pt returned via bed.  Chlorine/Chloramine water system checked every 4 hours.  Outpatient Dialysis at *not established    Patient repositioned every 2 hours during the treatment.  Post treatment report given to MILLY Bhat RN regarding 1L of fluid removed, last BP of 126/81, and patient pain rating of 8/10.

## 2023-01-27 NOTE — PLAN OF CARE
A&O x 1 to self, pleasant throughout shift, otherwise VSS on RA, trach capped. Turn and repoq2. Soft bite sized\moderate thick diet, poor intake. Denies nausea/SOB, c/o rectal pain. PRN tylenol provided. Rectal tube w/ adequate OP, leaking throughout shift. Anuric, on hemodialysis. PICC flushed/CDI/SL. Wound care provided per poc. Continue plan of care.

## 2023-01-28 LAB
ALBUMIN SERPL BCG-MCNC: 2 G/DL (ref 3.5–5.2)
ALP SERPL-CCNC: 235 U/L (ref 40–129)
ALT SERPL W P-5'-P-CCNC: 5 U/L (ref 10–50)
ANION GAP SERPL CALCULATED.3IONS-SCNC: 8 MMOL/L (ref 7–15)
AST SERPL W P-5'-P-CCNC: 19 U/L (ref 10–50)
BILIRUB SERPL-MCNC: 0.6 MG/DL
BUN SERPL-MCNC: 23.6 MG/DL (ref 6–20)
CALCIUM SERPL-MCNC: 8.6 MG/DL (ref 8.6–10)
CHLORIDE SERPL-SCNC: 97 MMOL/L (ref 98–107)
CREAT SERPL-MCNC: 2.54 MG/DL (ref 0.67–1.17)
DEPRECATED HCO3 PLAS-SCNC: 32 MMOL/L (ref 22–29)
GFR SERPL CREATININE-BSD FRML MDRD: 29 ML/MIN/1.73M2
GLUCOSE BLDC GLUCOMTR-MCNC: 113 MG/DL (ref 70–99)
GLUCOSE BLDC GLUCOMTR-MCNC: 120 MG/DL (ref 70–99)
GLUCOSE BLDC GLUCOMTR-MCNC: 139 MG/DL (ref 70–99)
GLUCOSE BLDC GLUCOMTR-MCNC: 91 MG/DL (ref 70–99)
GLUCOSE BLDC GLUCOMTR-MCNC: 98 MG/DL (ref 70–99)
GLUCOSE SERPL-MCNC: 93 MG/DL (ref 70–99)
POTASSIUM SERPL-SCNC: 4.5 MMOL/L (ref 3.4–5.3)
PROT SERPL-MCNC: 6.4 G/DL (ref 6.4–8.3)
SODIUM SERPL-SCNC: 137 MMOL/L (ref 136–145)

## 2023-01-28 PROCEDURE — 250N000013 HC RX MED GY IP 250 OP 250 PS 637: Performed by: STUDENT IN AN ORGANIZED HEALTH CARE EDUCATION/TRAINING PROGRAM

## 2023-01-28 PROCEDURE — 99233 SBSQ HOSP IP/OBS HIGH 50: CPT | Performed by: STUDENT IN AN ORGANIZED HEALTH CARE EDUCATION/TRAINING PROGRAM

## 2023-01-28 PROCEDURE — 250N000013 HC RX MED GY IP 250 OP 250 PS 637: Performed by: INTERNAL MEDICINE

## 2023-01-28 PROCEDURE — 80053 COMPREHEN METABOLIC PANEL: CPT | Performed by: INTERNAL MEDICINE

## 2023-01-28 PROCEDURE — 94640 AIRWAY INHALATION TREATMENT: CPT

## 2023-01-28 PROCEDURE — 250N000012 HC RX MED GY IP 250 OP 636 PS 637: Performed by: STUDENT IN AN ORGANIZED HEALTH CARE EDUCATION/TRAINING PROGRAM

## 2023-01-28 PROCEDURE — 250N000011 HC RX IP 250 OP 636: Performed by: STUDENT IN AN ORGANIZED HEALTH CARE EDUCATION/TRAINING PROGRAM

## 2023-01-28 PROCEDURE — 999N000157 HC STATISTIC RCP TIME EA 10 MIN

## 2023-01-28 PROCEDURE — 250N000009 HC RX 250: Performed by: STUDENT IN AN ORGANIZED HEALTH CARE EDUCATION/TRAINING PROGRAM

## 2023-01-28 PROCEDURE — 250N000013 HC RX MED GY IP 250 OP 250 PS 637

## 2023-01-28 PROCEDURE — 120N000001 HC R&B MED SURG/OB

## 2023-01-28 PROCEDURE — 94640 AIRWAY INHALATION TREATMENT: CPT | Mod: 76

## 2023-01-28 RX ORDER — QUETIAPINE FUMARATE 25 MG/1
25 TABLET, FILM COATED ORAL AT BEDTIME
Status: DISCONTINUED | OUTPATIENT
Start: 2023-01-28 | End: 2023-02-08

## 2023-01-28 RX ORDER — FLUOXETINE 10 MG/1
10 CAPSULE ORAL DAILY
Status: DISCONTINUED | OUTPATIENT
Start: 2023-01-28 | End: 2023-02-02

## 2023-01-28 RX ADMIN — Medication 2.5 MG: at 15:17

## 2023-01-28 RX ADMIN — APIXABAN 5 MG: 5 TABLET, FILM COATED ORAL at 20:50

## 2023-01-28 RX ADMIN — LACOSAMIDE 100 MG: 10 SOLUTION ORAL at 21:05

## 2023-01-28 RX ADMIN — Medication 40 MG: at 15:18

## 2023-01-28 RX ADMIN — MIDODRINE HYDROCHLORIDE 10 MG: 5 TABLET ORAL at 11:11

## 2023-01-28 RX ADMIN — LACTULOSE 20 G: 10 SOLUTION ORAL at 20:50

## 2023-01-28 RX ADMIN — ONDANSETRON 4 MG: 2 INJECTION INTRAMUSCULAR; INTRAVENOUS at 11:54

## 2023-01-28 RX ADMIN — Medication 2.5 MG: at 00:13

## 2023-01-28 RX ADMIN — LIDOCAINE 1 PATCH: 560 PATCH PERCUTANEOUS; TOPICAL; TRANSDERMAL at 08:53

## 2023-01-28 RX ADMIN — MIDODRINE HYDROCHLORIDE 10 MG: 5 TABLET ORAL at 08:52

## 2023-01-28 RX ADMIN — RIFAXIMIN 550 MG: 550 TABLET ORAL at 08:53

## 2023-01-28 RX ADMIN — QUETIAPINE FUMARATE 25 MG: 25 TABLET ORAL at 20:51

## 2023-01-28 RX ADMIN — WHITE PETROLATUM: 1.75 OINTMENT TOPICAL at 08:54

## 2023-01-28 RX ADMIN — Medication 40 MG: at 08:53

## 2023-01-28 RX ADMIN — FLUOXETINE 10 MG: 10 CAPSULE ORAL at 18:19

## 2023-01-28 RX ADMIN — MIDODRINE HYDROCHLORIDE 10 MG: 5 TABLET ORAL at 18:19

## 2023-01-28 RX ADMIN — IPRATROPIUM BROMIDE AND ALBUTEROL SULFATE 3 ML: 2.5; .5 SOLUTION RESPIRATORY (INHALATION) at 19:26

## 2023-01-28 RX ADMIN — RIFAXIMIN 550 MG: 550 TABLET ORAL at 20:51

## 2023-01-28 RX ADMIN — Medication 2.5 MG: at 23:02

## 2023-01-28 RX ADMIN — TACROLIMUS 1 MG: 5 CAPSULE ORAL at 08:53

## 2023-01-28 RX ADMIN — ACETYLCYSTEINE 2 ML: 200 SOLUTION ORAL; RESPIRATORY (INHALATION) at 19:26

## 2023-01-28 RX ADMIN — ACETAMINOPHEN 650 MG: 325 TABLET, FILM COATED ORAL at 23:02

## 2023-01-28 RX ADMIN — LACOSAMIDE 100 MG: 10 SOLUTION ORAL at 08:53

## 2023-01-28 RX ADMIN — ACETAMINOPHEN 650 MG: 325 TABLET, FILM COATED ORAL at 00:13

## 2023-01-28 RX ADMIN — LEVETIRACETAM 1000 MG: 100 SOLUTION ORAL at 21:58

## 2023-01-28 RX ADMIN — TACROLIMUS 1 MG: 5 CAPSULE ORAL at 18:19

## 2023-01-28 RX ADMIN — ACETAMINOPHEN 650 MG: 325 TABLET, FILM COATED ORAL at 11:11

## 2023-01-28 RX ADMIN — APIXABAN 5 MG: 5 TABLET, FILM COATED ORAL at 08:53

## 2023-01-28 RX ADMIN — FOLIC ACID 1 MG: 1 TABLET ORAL at 08:53

## 2023-01-28 RX ADMIN — Medication 5 ML: at 08:53

## 2023-01-28 RX ADMIN — TRAZODONE HYDROCHLORIDE 50 MG: 50 TABLET ORAL at 20:50

## 2023-01-28 ASSESSMENT — ACTIVITIES OF DAILY LIVING (ADL)
ADLS_ACUITY_SCORE: 58
ADLS_ACUITY_SCORE: 62
ADLS_ACUITY_SCORE: 58

## 2023-01-28 NOTE — PLAN OF CARE
"Orientations: A&Ox1-2 (\"hospital\" and self)   Vitals/Pain: Soft BP (94/60), tachy (109), RA, pain 7-8/10 rectum.   Lines/Drains: PICC 3 lumen SL   Skin/Wounds: Coccyx, pelvis (L), abdomen, elbow(L), spine (medial). Meplex in place on all   GI/: Rectal tube, anuria (hemodyalisis)   Labs: Abnormal/Trends: BG (114, 158, 115)  Electrolyte Replacement- N/A  Ambulation/Assist: Lift assist   Sleep Quality: Slept on and off after dialysis   Plan: Pt discharge to TCU rather than long term care per hopitalist note. Family at bedside and included in care. Manage pain and N/V (intermittenet)       "

## 2023-01-28 NOTE — PLAN OF CARE
Goal Outcome Evaluation:      Plan of Care Reviewed With: patient               Pt is A&O to self only. VSS, on RA. Tach capped, is able to communicate by whisper. Peg tube in place-clamped. Wounds to coccyx, left pelvis, left elbow, and mid spine. All covered with mepilexes, are CDI. Rectal tube in place with minimal output. LS clear in upper and coarse crackles in bases. Dialysis port to R chest- no urine this shift.  PRN oxycodone and tylenol for pain management. Pt is assistx2, turn and repo, on soft and bite size diet with mild thick liquids.

## 2023-01-28 NOTE — PROGRESS NOTES
[facility]    Medicine Progress Note - Hospitalist Service    Date of Admission:  1/21/2023    Assessment & Plan   Blanco Osborne is a 57 year old male admitted on 1/21/2023 as a transfer from [facility] for evaluation of loculated pleural effusion. Patient has multiple medical problems including history of liver transplant 2016 due to alcohol abuse, pAF on chronic anticoagulation, history of DM2, CVA, HTN, CHRISTIAN on BiPAP. 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 Discharge to [facility], back to [facility] and back to [facility] during month of December.     Recently while at [facility] there were concerns for worsening effusions. He had thoracentesis 01/13 which returned exudative without growth on cultures. CT chest/abdomen/pelvis on 01/18 demonstrated worsening effusions and concerns for infected parapneumonic effusions with possible SBP.  CT findings discussed with Pulmonology team with 3 different pulmonologist including (Dr. Monge, Dr. Chu and Dr. Jay) with agreement that patient needs chest tube and possible lytics and therefore transfer to higher level of care.      See discharge summary 01/21/2023 for more details of his recent hx.      Bilateral pleural effusions, ? empyema  Acute now chronic respiratory failure requiring tracheostomy  - resolved    Pneumonia  - resolved   ARDS  - resolved    Right pneumothorax - resolved   Initial event was parainfluenza and strep pneumo pneumonia back in November 2022. Treated for ARDS. Had failed extubation x2 and tracheostomy performed last hospitalization.  Had additional complications of bilateral pneumothoraces as well as hydropneumothorax- pleural fluid grew candida parapsilosis. He was treated with IV micafungin and currently on fluconazole, to finish 4-week course. Chest  tube was placed and subsequently removed. [facility] from the ventilator. His respiratory status is now stable on room air.   * Most recently, there has been concern for right complicated parapneumonic effusion. S/p thoracentesis 11/26 and 1/13.  Right thoracentesis 1/13 consistent with exudative effusion with elevated LDH, high neutrophils, cultures show NGTD. CT on 1/18 showed small bilateral partially loculated pleural effusions with associated pleural enhancement consistent with infected effusions. Pulmonary consultants at [facility] have recommended transfer to [facility] for consideration of chest tube placement and possible intra-pleural lytics. Dr. Howe with thoracic surgery consulted prior to transfer.   - CT chest ordered by Dr. Jackson 1/22, small effusions on imaging and no plans for a chest tube. Can consider a diagnostic thoracentesis however unclear utility. Patient had one at [facility] 01/13 which is exudative but cultures have been NGTD. Currently no s/s of worsening infection only elevated inflammatory markers. ID currently following, appreciate their input regarding need for thoracentesis and not starting antibiotics.  -Completed 4-week treatment for Candida.  - Continue mucomyst, albuterol and duonebs   - Tracheostomy care per RT  -Discussed with social work team to look into [facility] for transfer, patient can be transferred as early as 1/25.  1/25, nursing and mentioned that patient had refused to undergo dialysis and want to stop care, palliative care was consulted, on 1/26 had an extensive discussion with the patient and spouse in the room and they denied any wish to stop care or going to comfort measures, they are still full code and wished to pursue full care.  This was discussed with palliative care as well.  1/27 patient has a rectal tube and has lots of diarrhea, I did place lactulose on hold for now, will start it back depending on stool output, might be we could  remove the rectal tube, currently we are waiting for input from [place] whether they would take him back or not,  his needs have come down since he was not receiving any tube feeding here and also he is not having any oxygen through his trach.    S/p Liver transplant 2016   Cirrhosis with ascites  Subacute bacterial peritonitis  Main manifestations of this are hepatic encephalopathy and ascites.  Hepatologist at [place] felt that most likely reason for the cirrhosis was metabolic syndrome or alcohol intake.  There was no evidence of rejection on biopsy and there was some evidence of regeneration. Paracentesis done on 12/22/2022 showed lactobacillus indicating SBP, treated with Unasyn and Augmentin, finished 2-week course 1/12/2023.  Requires large-volume paracentesis periodically for recurring ascites.    * Paracentesis 1/13/2023 yielded about 7500 cc. Cultures NGTD.    * Repeat CT abdomen/pelvis on 1/18 concerning for SBP given peritoneal enhancement.  * Repeat labs 1/19:  procalcitonin 1.64 from 1.81.  Ammonia 32.   -Due to abdominal distention will order a therapeutic paracentesis, will also order cell counts on it to rule out SBP but the patient did not have any symptoms of SBP.  - Continue intermittent paracentesis as needed if develops symptomatic ascites.    - Further treatment for recurrent ascites with TIPS deferred to his Liver Transplant team and/or Hepatology, he has an appointment on 4/21/2023 with Hepatology, Dr. Simms  - Continue Tacrolimus 1 mg BID.  - Continue Lactulose and Rifaximin   -Patient underwent another paracentesis on 1/25 with a 4.8 L removed, Patient does have fullness after eating.     Acute metabolic encephalopathy, resolved     Diarrhea  Liquid stool requiring rectal tube. Patient transferred with this the rectal tube and unclear how long the liquid diarrhea has been going on. Foul smelling  - C. difficile negative, diarrhea present, first thought to initiate Imodium but  later realized that we are giving him lactulose 20 mg twice daily, currently lactulose is on hold since 1/27, will have to start it back depending on the stool output since patient has underlying cirrhosis.     Paroxysmal atrial fibrillation  Remains in sinus rhythm.  On anticoagulation with apixaban indefinitely for stroke prophylaxis.    -Apixaban was on hold, will restart that.  - Cardiac monitoring       ESRD: Now on iHD.  No return of renal function.  - MWF schedule   - Nephrology consulted, patient is undergoing dialysis here as per schedule.     Acute anemia  Pt has required intermittent transfusions for on-going anemia.   -Anemia most likely secondary to critical illness/chronic disease, chronic renal disease  -Transfuse packed red blood cells to keep Hb> 7  -Monitor H&H.     History PEA arrest   PEA arrest prior to his previous admission and 1 episode of PEA associated with desaturation on 12/20.  No structural cardiac disease.   - Continue Cardiac Monitoring     Hypotension  Also has developed baseline hypotension 2/2 cirrhosis and prolonged critical illness.   -Continue current midodrine dose       Seizure after hypoxic event.  This is in the context of baseline multifactorial encephalopathy secondary to his ongoing critical illness and prior cardiac arrests and prior strokes and cirrhosis with hepatic encephalopathy. MRI of head of 12/20/22 reveals no PRES or leptomeningeal enhancement but scattered.  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   - Continue with  Keppra and Vimpat       Diabetes mellitus type 2  -Lantus decreased to 5 units daily on 1/19, hold this for now due to low PO intake  -MDSSI  -Hypoglycemic protocol in place     Moderate malnutrition, protein and calorie type.  -Continue with tube feeds via PEG tube, patient refusing to eat or have TF restarted. He reports ongoing nausea and distention.  Calorie count has been initiated,  discussed that with family and discussed that with patient in detail.  He would need at least bolus feeding with poor calorie counts, patient is refusing bolus feeding and tube feeding as such.  - Nutritionist consulted and following  Underwent another paracentesis on 1/24 with almost 5 L removed.    Anxiety  - start fluoxetine 1/28, consider dose increase in 2-3 weeks pending response       Diet: Soft & Bite Sized Diet (level 6) Liquidized/Moderately Thick (level 3)  Room Service  Adult Formula Bolus Feeding: Novasource Renal; Route: Gastrostomy; 3 times daily; Volume per Bolus: 240; mL(s); 80 mL/hr x 3 hrs back up bolus feeding after each meal ONLY IF INTAKE <50% of meal  Snacks/Supplements Adult: Other; 4 oz Ensure with breakfast, Gelatein+ with lunch and magic cup with dinner (RD); With Meals    DVT Prophylaxis: DOAC  Nixon Catheter: Not present  Lines: PRESENT      PICC 11/24/22 Triple Lumen Right Brachial vein medial Access-Site Assessment: WDL  CVC Double Lumen Right External jugular Tunneled-Site Assessment: WDL      Cardiac Monitoring: None  Code Status: Full Code      Clinically Significant Risk Factors           # Hypercalcemia: corrected calcium is >10.1, will monitor as appropriate    # Hypoalbuminemia: Lowest albumin = 1.9 g/dL at 1/23/2023  6:38 AM, will monitor as appropriate            # Severe Malnutrition: based on nutrition assessment        Disposition Plan     Expected Discharge Date: 01/30/2023,  3:00 PM  Discharge Delays: *Medically Ready for Discharge  Destination: inpatient rehabilitation facility  Discharge Comments: 1/25 Palliative consult. [facility] won't take pt.  1/27 placement, no more TF, on dialysis and not able to sit up          Michael Chou MD  Hospitalist Service  [facility]  Securely message with Matthieu (more info)  Text page via [facility] Paging/Directory   ______________________________________________________________________    Interval History    Ongoing issues with poor sleep. No BM since changing rectal tube on evening on 1/28. He is eager to get rectal tube out.  Poor sleep. Biggest issue is with getting back to sleep due to anxiety. He does not have trouble falling asleep at bedtime.     Physical Exam   Vital Signs: Temp: 98.2  F (36.8  C) Temp src: Axillary BP: 91/50 Pulse: 91   Resp: 22 SpO2: 95 % O2 Device: None (Room air)    Weight: 194 lbs .08 oz    Constitutional: Awake, alert, cooperative, no apparent distress. Very deconditioned.   Respiratory: Clear to auscultation bilaterally, no crackles or wheezing  Cardiovascular: Regular rate and rhythm, normal S1 and S2, and no murmur noted  GI: Normal bowel sounds, soft, non-distended, non-tender  Skin/Integumen: No rashes, no cyanosis, no edema  Other:       Medical Decision Making       50 MINUTES SPENT BY ME on the date of service doing chart review, history, exam, documentation & further activities per the note.      Data     I have personally reviewed the following data over the past 24 hrs:    N/A  \   N/A   / N/A     137 97 (L) 23.6 (H) /  113 (H)   4.5 32 (H) 2.54 (H) \       ALT: 5 (L) AST: 19 AP: 235 (H) TBILI: 0.6   ALB: 2.0 (L) TOT PROTEIN: 6.4 LIPASE: N/A       Imaging results reviewed over the past 24 hrs:   No results found for this or any previous visit (from the past 24 hour(s)).

## 2023-01-29 ENCOUNTER — APPOINTMENT (OUTPATIENT)
Dept: GENERAL RADIOLOGY | Facility: CLINIC | Age: 58
DRG: 981 | End: 2023-01-29
Attending: STUDENT IN AN ORGANIZED HEALTH CARE EDUCATION/TRAINING PROGRAM
Payer: COMMERCIAL

## 2023-01-29 LAB
BACTERIA FLD CULT: NO GROWTH
GLUCOSE BLDC GLUCOMTR-MCNC: 104 MG/DL (ref 70–99)
GLUCOSE BLDC GLUCOMTR-MCNC: 131 MG/DL (ref 70–99)
GLUCOSE BLDC GLUCOMTR-MCNC: 132 MG/DL (ref 70–99)
GLUCOSE BLDC GLUCOMTR-MCNC: 141 MG/DL (ref 70–99)
GLUCOSE BLDC GLUCOMTR-MCNC: 141 MG/DL (ref 70–99)
GRAM STAIN RESULT: NORMAL
GRAM STAIN RESULT: NORMAL

## 2023-01-29 PROCEDURE — 250N000013 HC RX MED GY IP 250 OP 250 PS 637

## 2023-01-29 PROCEDURE — 250N000011 HC RX IP 250 OP 636: Performed by: STUDENT IN AN ORGANIZED HEALTH CARE EDUCATION/TRAINING PROGRAM

## 2023-01-29 PROCEDURE — 250N000012 HC RX MED GY IP 250 OP 636 PS 637: Performed by: STUDENT IN AN ORGANIZED HEALTH CARE EDUCATION/TRAINING PROGRAM

## 2023-01-29 PROCEDURE — 999N000040 HC STATISTIC CONSULT NO CHARGE VASC ACCESS

## 2023-01-29 PROCEDURE — 99232 SBSQ HOSP IP/OBS MODERATE 35: CPT | Performed by: INTERNAL MEDICINE

## 2023-01-29 PROCEDURE — 250N000013 HC RX MED GY IP 250 OP 250 PS 637: Performed by: INTERNAL MEDICINE

## 2023-01-29 PROCEDURE — 250N000013 HC RX MED GY IP 250 OP 250 PS 637: Performed by: STUDENT IN AN ORGANIZED HEALTH CARE EDUCATION/TRAINING PROGRAM

## 2023-01-29 PROCEDURE — 250N000009 HC RX 250: Performed by: STUDENT IN AN ORGANIZED HEALTH CARE EDUCATION/TRAINING PROGRAM

## 2023-01-29 PROCEDURE — 120N000001 HC R&B MED SURG/OB

## 2023-01-29 PROCEDURE — 99232 SBSQ HOSP IP/OBS MODERATE 35: CPT | Performed by: STUDENT IN AN ORGANIZED HEALTH CARE EDUCATION/TRAINING PROGRAM

## 2023-01-29 PROCEDURE — 71045 X-RAY EXAM CHEST 1 VIEW: CPT

## 2023-01-29 RX ORDER — ACETYLCYSTEINE 200 MG/ML
2 SOLUTION ORAL; RESPIRATORY (INHALATION) 2 TIMES DAILY PRN
Status: DISCONTINUED | OUTPATIENT
Start: 2023-01-29 | End: 2023-04-28 | Stop reason: HOSPADM

## 2023-01-29 RX ORDER — IPRATROPIUM BROMIDE AND ALBUTEROL SULFATE 2.5; .5 MG/3ML; MG/3ML
3 SOLUTION RESPIRATORY (INHALATION) EVERY 4 HOURS PRN
Status: DISCONTINUED | OUTPATIENT
Start: 2023-01-29 | End: 2023-04-28 | Stop reason: HOSPADM

## 2023-01-29 RX ADMIN — NYSTATIN 500000 UNITS: 100000 SUSPENSION ORAL at 15:26

## 2023-01-29 RX ADMIN — LEVETIRACETAM 1000 MG: 100 SOLUTION ORAL at 22:57

## 2023-01-29 RX ADMIN — QUETIAPINE FUMARATE 25 MG: 25 TABLET ORAL at 21:02

## 2023-01-29 RX ADMIN — Medication 40 MG: at 08:08

## 2023-01-29 RX ADMIN — WHITE PETROLATUM: 1.75 OINTMENT TOPICAL at 08:09

## 2023-01-29 RX ADMIN — LACTULOSE 20 G: 10 SOLUTION ORAL at 08:08

## 2023-01-29 RX ADMIN — LACOSAMIDE 100 MG: 10 SOLUTION ORAL at 09:42

## 2023-01-29 RX ADMIN — TACROLIMUS 1 MG: 5 CAPSULE ORAL at 08:07

## 2023-01-29 RX ADMIN — RIFAXIMIN 550 MG: 550 TABLET ORAL at 20:53

## 2023-01-29 RX ADMIN — ACETAMINOPHEN 650 MG: 325 TABLET, FILM COATED ORAL at 15:27

## 2023-01-29 RX ADMIN — Medication 2.5 MG: at 15:27

## 2023-01-29 RX ADMIN — MIDODRINE HYDROCHLORIDE 10 MG: 5 TABLET ORAL at 17:40

## 2023-01-29 RX ADMIN — ACETAMINOPHEN 650 MG: 325 TABLET, FILM COATED ORAL at 10:19

## 2023-01-29 RX ADMIN — TACROLIMUS 1 MG: 5 CAPSULE ORAL at 17:41

## 2023-01-29 RX ADMIN — MIDODRINE HYDROCHLORIDE 10 MG: 5 TABLET ORAL at 15:26

## 2023-01-29 RX ADMIN — FOLIC ACID 1 MG: 1 TABLET ORAL at 08:08

## 2023-01-29 RX ADMIN — LACOSAMIDE 100 MG: 10 SOLUTION ORAL at 20:53

## 2023-01-29 RX ADMIN — ONDANSETRON 4 MG: 2 INJECTION INTRAMUSCULAR; INTRAVENOUS at 12:57

## 2023-01-29 RX ADMIN — LACTULOSE 20 G: 10 SOLUTION ORAL at 20:53

## 2023-01-29 RX ADMIN — NYSTATIN 500000 UNITS: 100000 SUSPENSION ORAL at 17:40

## 2023-01-29 RX ADMIN — APIXABAN 5 MG: 5 TABLET, FILM COATED ORAL at 20:53

## 2023-01-29 RX ADMIN — LIDOCAINE 1 PATCH: 560 PATCH PERCUTANEOUS; TOPICAL; TRANSDERMAL at 08:07

## 2023-01-29 RX ADMIN — MIDODRINE HYDROCHLORIDE 10 MG: 5 TABLET ORAL at 08:08

## 2023-01-29 RX ADMIN — FLUOXETINE 10 MG: 10 CAPSULE ORAL at 08:08

## 2023-01-29 RX ADMIN — RIFAXIMIN 550 MG: 550 TABLET ORAL at 08:08

## 2023-01-29 RX ADMIN — APIXABAN 5 MG: 5 TABLET, FILM COATED ORAL at 08:08

## 2023-01-29 RX ADMIN — NYSTATIN 500000 UNITS: 100000 SUSPENSION ORAL at 21:18

## 2023-01-29 RX ADMIN — ACETAMINOPHEN 650 MG: 650 SUPPOSITORY RECTAL at 23:28

## 2023-01-29 RX ADMIN — Medication 40 MG: at 15:37

## 2023-01-29 RX ADMIN — Medication 2.5 MG: at 09:43

## 2023-01-29 ASSESSMENT — ACTIVITIES OF DAILY LIVING (ADL)
ADLS_ACUITY_SCORE: 58

## 2023-01-29 NOTE — PLAN OF CARE
Goal Outcome Evaluation:      Plan of Care Reviewed With: patient    Overall Patient Progress: decliningOverall Patient Progress: declining         Pt is A&O x1-2.  VSS (BPs remain on softer side), on RA. Trach capped, is able to communicate by whisper. Peg tube in place-clamped. Wounds/ skin tears to coccyx, left pelvis, left elbow, and mid spine. All covered with mepilexes, are CDI. Rectal tube in place with minimal output. LS clear in upper and coarse crackles in bases. Dialysis port to R chest- changed x2 this shift d/t pt pulling off dressing. No UOP this shift. PRN oxycodone and tylenol for pain management. Pt is assistx2, turn and repo, on soft and bite size diet with mild thick liquids.

## 2023-01-29 NOTE — PROGRESS NOTES
[facility]    Medicine Progress Note - Hospitalist Service    Date of Admission:  1/21/2023    Assessment & Plan   Blanco Osborne is a 57 year old male admitted on 1/21/2023 as a transfer from [facility] for evaluation of loculated pleural effusion. Patient has multiple medical problems including history of liver transplant 2016 due to alcohol abuse, pAF on chronic anticoagulation, history of DM2, CVA, HTN, CHRISTIAN on BiPAP. 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 Discharge to [facility], back to [facility] and back to [facility] during month of December.     Recently while at [facility] there were concerns for worsening effusions. He had thoracentesis 01/13 which returned exudative without growth on cultures. CT chest/abdomen/pelvis on 01/18 demonstrated worsening effusions and concerns for infected parapneumonic effusions with possible SBP.  CT findings discussed with Pulmonology team with 3 different pulmonologist including (Dr. Monge, Dr. Chu and Dr. Jay) with agreement that patient needs chest tube and possible lytics and therefore transfer to higher level of care.      See discharge summary 01/21/2023 for more details of his recent hx.      Bilateral pleural effusions, ? empyema  Acute now chronic respiratory failure requiring tracheostomy  - resolved    Pneumonia  - resolved   ARDS  - resolved    Right pneumothorax - resolved   Initial event was parainfluenza and strep pneumo pneumonia back in November 2022. Treated for ARDS. Had failed extubation x2 and tracheostomy performed last hospitalization.  Had additional complications of bilateral pneumothoraces as well as hydropneumothorax- pleural fluid grew candida parapsilosis. He was treated with IV micafungin and currently on fluconazole, to finish 4-week course. Chest  tube was placed and subsequently removed. [facility] ventil[facility]. His respiratory status is now stable on room air.   * Most recently, there has been concern for right complicated parapneumonic effusion. S/p thoracentesis 11/26 and 1/13.  Right thoracentesis 1/13 consistent with exudative effusion with elevated LDH, high neutrophils, cultures show NGTD. CT on 1/18 showed small bilateral partially loculated pleural effusions with associated pleural enhancement consistent with infected effusions. Pulmonary consultants at [facility] have recommended transfer to [facility] for consideration of chest tube placement and possible intra-pleural lytics. Dr. Howe with thoracic surgery consulted prior to transfer.   - CT chest ordered by Dr. Jackson 1/22, small effusions on imaging and no plans for a chest tube. Can consider a diagnostic thoracentesis however unclear utility. Patient had one at [facility] 01/13 which is exudative but cultures have been NGTD. Currently no s/s of worsening infection only elevated inflammatory markers. ID currently following, appreciate their input regarding need for thoracentesis and not starting antibiotics.  -Completed 4-week treatment for Candida.  - Continue mucomyst, albuterol and duonebs   - Tracheostomy care per RT  -Discussed with social work team to look into [facility] for transfer, patient can be transferred as early as 1/25.  1/25, nursing and mentioned that patient had refused to undergo dialysis and want to stop care, palliative care was consulted, on 1/26 had an extensive discussion with the patient and spouse in the room and they denied any wish to stop care or going to comfort measures, they are still full code and wished to pursue full care.  This was discussed with palliative care as well.  1/27 patient has a rectal tube and has lots of diarrhea, I did place lactulose on hold for now, will start it back depending on stool output, might be we could  remove the rectal tube, currently we are waiting for input from [place] whether they would take him back or not,  his needs have come down since he was not receiving any tube feeding here and also he is not having any oxygen through his trach.    S/p Liver transplant 2016   Cirrhosis with ascites  Subacute bacterial peritonitis  Main manifestations of this are hepatic encephalopathy and ascites.  Hepatologist at [place] felt that most likely reason for the cirrhosis was metabolic syndrome or alcohol intake.  There was no evidence of rejection on biopsy and there was some evidence of regeneration. Paracentesis done on 12/22/2022 showed lactobacillus indicating SBP, treated with Unasyn and Augmentin, finished 2-week course 1/12/2023.  Requires large-volume paracentesis periodically for recurring ascites.    * Paracentesis 1/13/2023 yielded about 7500 cc. Cultures NGTD.    * Repeat CT abdomen/pelvis on 1/18 concerning for SBP given peritoneal enhancement.  * Repeat labs 1/19:  procalcitonin 1.64 from 1.81.  Ammonia 32.   -Due to abdominal distention will order a therapeutic paracentesis, will also order cell counts on it to rule out SBP but the patient did not have any symptoms of SBP.  - Continue intermittent paracentesis as needed if develops symptomatic ascites.    - Further treatment for recurrent ascites with TIPS deferred to his Liver Transplant team and/or Hepatology, he has an appointment on 4/21/2023 with Hepatology, Dr. Simms  - Continue Tacrolimus 1 mg BID.  - Continue Lactulose and Rifaximin   -Patient underwent another paracentesis on 1/25 with a 4.8 L removed,     Acute metabolic encephalopathy, resolved     Diarrhea  Liquid stool requiring rectal tube. Patient transferred with this the rectal tube and unclear how long the liquid diarrhea has been going on. Foul smelling  - C. difficile negative, diarrhea present, first thought to initiate Imodium but later realized that we are giving him  lactulose 20 mg twice daily, currently lactulose is on hold since 1/27, will have to start it back depending on the stool output since patient has underlying cirrhosis.     Paroxysmal atrial fibrillation  Remains in sinus rhythm.  On anticoagulation with apixaban indefinitely for stroke prophylaxis.    -Apixaban was on hold, will restart that.  - Cardiac monitoring       ESRD: Now on iHD.  No return of renal function.  - MWF schedule   - Nephrology consulted, patient is undergoing dialysis here as per schedule.     Acute anemia  Pt has required intermittent transfusions for on-going anemia.   -Anemia most likely secondary to critical illness/chronic disease, chronic renal disease  -Transfuse packed red blood cells to keep Hb> 7  -Monitor H&H.     History PEA arrest   PEA arrest prior to his previous admission and 1 episode of PEA associated with desaturation on 12/20.  No structural cardiac disease.   - Continue Cardiac Monitoring     Hypotension  Also has developed baseline hypotension 2/2 cirrhosis and prolonged critical illness.   -Continue current midodrine dose       Seizure after hypoxic event.  This is in the context of baseline multifactorial encephalopathy secondary to his ongoing critical illness and prior cardiac arrests and prior strokes and cirrhosis with hepatic encephalopathy. MRI of head of 12/20/22 reveals no PRES or leptomeningeal enhancement but scattered.  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   - Continue with  Keppra and Vimpat       Diabetes mellitus type 2  -Lantus decreased to 5 units daily on 1/19, hold this for now due to low PO intake  -MDSSI  -Hypoglycemic protocol in place     Moderate malnutrition, protein and calorie type.  -Continue with tube feeds via PEG tube, patient refusing to eat or have TF restarted. He reports ongoing nausea and distention.  Calorie count has been initiated, discussed that with family and discussed  that with patient in detail.  He would need at least bolus feeding with poor calorie counts, patient is refusing bolus feeding and tube feeding as such.  - Nutritionist consulted and following  Underwent another paracentesis on 1/24 with almost 5 L removed.    Anxiety  - start fluoxetine 1/28, consider dose increase in 2-3 weeks pending response       Diet: Soft & Bite Sized Diet (level 6) Liquidized/Moderately Thick (level 3)  Room Service  Adult Formula Bolus Feeding: Novasource Renal; Route: Gastrostomy; 3 times daily; Volume per Bolus: 240; mL(s); 80 mL/hr x 3 hrs back up bolus feeding after each meal ONLY IF INTAKE <50% of meal  Snacks/Supplements Adult: Other; 4 oz Ensure with breakfast, Gelatein+ with lunch and magic cup with dinner (RD); With Meals    DVT Prophylaxis: DOAC  Nixon Catheter: Not present  Lines: PRESENT      PICC 11/24/22 Triple Lumen Right Brachial vein medial Access-Site Assessment: WDL except (Pt removed dressing)  CVC Double Lumen Right External jugular Tunneled-Site Assessment: WDL      Cardiac Monitoring: None  Code Status: Full Code      Clinically Significant Risk Factors           # Hypercalcemia: corrected calcium is >10.1, will monitor as appropriate    # Hypoalbuminemia: Lowest albumin = 1.9 g/dL at 1/23/2023  6:38 AM, will monitor as appropriate            # Severe Malnutrition: based on nutrition assessment        Disposition Plan      Expected Discharge Date: 01/30/2023,  3:00 PM  Discharge Delays: *Medically Ready for Discharge  Destination: inpatient rehabilitation facility  Discharge Comments: 1/25 Palliative consult. [facility]'t take pt.  1/27 placement, no more TF, on dialysis and not able to sit up          Michael Chou MD  Hospitalist Service  [facility]  Securely message with ALTHIAbertha (more info)  Text page via [facility] Paging/Directory   ______________________________________________________________________    Interval History   BMs have  resumed with restarting lactulose. Continues to have confusion at night and while sleeping. He is pulling his dressings at night and while sleeping.     Physical Exam   Vital Signs: Temp: 97.6  F (36.4  C) Temp src: Axillary BP: 92/60 Pulse: 96   Resp: 20 SpO2: 94 % O2 Device: None (Room air)    Weight: 194 lbs .08 oz    Constitutional: Awake, alert, cooperative, no apparent distress. Very deconditioned.   Respiratory: Clear to auscultation bilaterally, no crackles or wheezing  Cardiovascular: Regular rate and rhythm, normal S1 and S2, and no murmur noted  GI: Normal bowel sounds, soft, non-distended, non-tender  Skin/Integumen: No rashes, no cyanosis, no edema  Other:       Medical Decision Making       35 MINUTES SPENT BY ME on the date of service doing chart review, history, exam, documentation & further activities per the note.      Data         Imaging results reviewed over the past 24 hrs:   No results found for this or any previous visit (from the past 24 hour(s)).

## 2023-01-29 NOTE — PLAN OF CARE
"Orientations: A&Ox1-2 (\"hospital\" and self)   Vitals/Pain: Soft BP (92/60), tachy (102), RA, pain 10/10 rectum, black line was out more (3 inches),  balloon deflated and rectal tube inserted more an dreinflated. This helped decrease rectal pain.    Lines/Drains: PICC 3 lumen SL   Skin/Wounds: Coccyx, pelvis (L), abdomen, elbow(L), spine (medial). Meplex in place on all (changed mult. Times today)  GI/: Rectal tube (small leakage), anuria (hemodyalisis)   Labs: Abnormal/Trends: BG (131, 104, 132)  Electrolyte Replacement- N/A  Ambulation/Assist: Lift assist   Sleep Quality: Slept on and off after dialysis   Plan: Pt discharge to inpt rehab 1/30 per physician note. Family at bedside and included in care.     Pt ripped off all meplex over wounds, PEG dressing, Trach dressing and PICC dressing. Pt also takes off gown and blankets and lays naked, when ask if pt wants gown on, pt said 'no\", gown on recliner. Writer consulted vascular team to look at PICC line and ordered chest Xray for PICC placement (see results). Pt also tried to get out of bed tonight x1.     Pt family (wife Ofe, and brother Dylan) requesting to be notified if pt refuses treatments (mostly tube feeding and nebs), they are also wanting to talk with  or care coordinator about discharge planning. Ofe and Dylan would also like more understanding of tracheostomy plans, Ofe stated \"someone told me they were going to take it out so that hole would close and we are wondering when that will happen because his tracheostomy has been capped this whole time\". Ofe would like hospitalist to contact pulmonologist Jossy or Deonte to understand trach next steps (per Jossy note 1/19).       "

## 2023-01-29 NOTE — PROGRESS NOTES
Renal Medicine C      Following for ESRD management    Dialyzed 01/27/23 without issue  UF 1 liter    Comfortable in bed  Answers question  Aware that Monday is a dialysis day    No O2 requirement     Plan dialysis 01/30/23  Dialysis orders entered             Recent Labs   Lab 01/29/23  0738 01/28/23  0733 01/28/23  0609   NA  --   --  137   POTASSIUM  --   --  4.5   CHLORIDE  --   --  97*   CO2  --   --  32*   ANIONGAP  --   --  8   *   < > 93   BUN  --   --  23.6*   CR  --   --  2.54*   GFRESTIMATED  --   --  29*   KELLY  --   --  8.6    < > = values in this interval not displayed.           MIKEL Reilly    [facility]  [phone]

## 2023-01-29 NOTE — PLAN OF CARE
"Orientations: A&Ox1-2 (\"hospital\" and self)   Vitals/Pain: Soft BP (100/67), tachy (102), RA, pain 7-8/10 rectum.   Lines/Drains: PICC 3 lumen SL   Skin/Wounds: Coccyx, pelvis (L), abdomen, elbow(L), spine (medial). Meplex in place on all   GI/: Rectal tube, anuria (hemodyalisis)   Labs: Abnormal/Trends: BG (91, 113, 120)  Electrolyte Replacement- N/A  Ambulation/Assist: Lift assist   Sleep Quality: Slept on and off after dialysis   Plan: Pt discharge to inpt rehab 1/30 per physician note. Family at bedside and included in care.       "

## 2023-01-29 NOTE — PROGRESS NOTES
Reported that patient has pulled off PICC dressing multiple times, including last noc.  VAT has not been requested to evaluate until now.  At placement, PICC external catheter length was 3cm, is now approximately 8 cm.  Nursing staff advised not to use PICC, and obtain pcxr.  VAT will follow again with primary nursing once PICC tip location determined.  Redressed with sterile dressing while waiting for xray.

## 2023-01-30 LAB
ANION GAP SERPL CALCULATED.3IONS-SCNC: 13 MMOL/L (ref 7–15)
BUN SERPL-MCNC: 45.2 MG/DL (ref 6–20)
CALCIUM SERPL-MCNC: 9.2 MG/DL (ref 8.6–10)
CHLORIDE SERPL-SCNC: 96 MMOL/L (ref 98–107)
CREAT SERPL-MCNC: 3.99 MG/DL (ref 0.67–1.17)
DEPRECATED HCO3 PLAS-SCNC: 30 MMOL/L (ref 22–29)
GFR SERPL CREATININE-BSD FRML MDRD: 17 ML/MIN/1.73M2
GLUCOSE BLDC GLUCOMTR-MCNC: 107 MG/DL (ref 70–99)
GLUCOSE BLDC GLUCOMTR-MCNC: 115 MG/DL (ref 70–99)
GLUCOSE BLDC GLUCOMTR-MCNC: 116 MG/DL (ref 70–99)
GLUCOSE BLDC GLUCOMTR-MCNC: 123 MG/DL (ref 70–99)
GLUCOSE BLDC GLUCOMTR-MCNC: 127 MG/DL (ref 70–99)
GLUCOSE SERPL-MCNC: 111 MG/DL (ref 70–99)
MAGNESIUM SERPL-MCNC: 1.9 MG/DL (ref 1.7–2.3)
PHOSPHATE SERPL-MCNC: 4.8 MG/DL (ref 2.5–4.5)
POTASSIUM SERPL-SCNC: 4.3 MMOL/L (ref 3.4–5.3)
SODIUM SERPL-SCNC: 139 MMOL/L (ref 136–145)

## 2023-01-30 PROCEDURE — 83550 IRON BINDING TEST: CPT | Performed by: INTERNAL MEDICINE

## 2023-01-30 PROCEDURE — 90937 HEMODIALYSIS REPEATED EVAL: CPT

## 2023-01-30 PROCEDURE — 250N000009 HC RX 250: Performed by: STUDENT IN AN ORGANIZED HEALTH CARE EDUCATION/TRAINING PROGRAM

## 2023-01-30 PROCEDURE — 250N000013 HC RX MED GY IP 250 OP 250 PS 637: Performed by: INTERNAL MEDICINE

## 2023-01-30 PROCEDURE — 82310 ASSAY OF CALCIUM: CPT | Performed by: STUDENT IN AN ORGANIZED HEALTH CARE EDUCATION/TRAINING PROGRAM

## 2023-01-30 PROCEDURE — 120N000001 HC R&B MED SURG/OB

## 2023-01-30 PROCEDURE — 90935 HEMODIALYSIS ONE EVALUATION: CPT | Performed by: INTERNAL MEDICINE

## 2023-01-30 PROCEDURE — 250N000013 HC RX MED GY IP 250 OP 250 PS 637: Performed by: STUDENT IN AN ORGANIZED HEALTH CARE EDUCATION/TRAINING PROGRAM

## 2023-01-30 PROCEDURE — G0463 HOSPITAL OUTPT CLINIC VISIT: HCPCS

## 2023-01-30 PROCEDURE — 634N000001 HC RX 634: Performed by: INTERNAL MEDICINE

## 2023-01-30 PROCEDURE — 250N000013 HC RX MED GY IP 250 OP 250 PS 637

## 2023-01-30 PROCEDURE — 82728 ASSAY OF FERRITIN: CPT | Performed by: INTERNAL MEDICINE

## 2023-01-30 PROCEDURE — 83735 ASSAY OF MAGNESIUM: CPT | Performed by: STUDENT IN AN ORGANIZED HEALTH CARE EDUCATION/TRAINING PROGRAM

## 2023-01-30 PROCEDURE — 99233 SBSQ HOSP IP/OBS HIGH 50: CPT | Performed by: STUDENT IN AN ORGANIZED HEALTH CARE EDUCATION/TRAINING PROGRAM

## 2023-01-30 PROCEDURE — 84100 ASSAY OF PHOSPHORUS: CPT | Performed by: STUDENT IN AN ORGANIZED HEALTH CARE EDUCATION/TRAINING PROGRAM

## 2023-01-30 PROCEDURE — 250N000012 HC RX MED GY IP 250 OP 636 PS 637: Performed by: STUDENT IN AN ORGANIZED HEALTH CARE EDUCATION/TRAINING PROGRAM

## 2023-01-30 PROCEDURE — 258N000003 HC RX IP 258 OP 636: Performed by: INTERNAL MEDICINE

## 2023-01-30 PROCEDURE — 250N000011 HC RX IP 250 OP 636: Performed by: STUDENT IN AN ORGANIZED HEALTH CARE EDUCATION/TRAINING PROGRAM

## 2023-01-30 RX ADMIN — SODIUM CHLORIDE 300 ML: 9 INJECTION, SOLUTION INTRAVENOUS at 11:03

## 2023-01-30 RX ADMIN — Medication 2.5 MG: at 11:22

## 2023-01-30 RX ADMIN — MIDODRINE HYDROCHLORIDE 10 MG: 5 TABLET ORAL at 13:50

## 2023-01-30 RX ADMIN — LACOSAMIDE 100 MG: 10 SOLUTION ORAL at 22:24

## 2023-01-30 RX ADMIN — RIFAXIMIN 550 MG: 550 TABLET ORAL at 08:33

## 2023-01-30 RX ADMIN — EPOETIN ALFA-EPBX 13000 UNITS: 10000 INJECTION, SOLUTION INTRAVENOUS; SUBCUTANEOUS at 11:02

## 2023-01-30 RX ADMIN — NYSTATIN 500000 UNITS: 100000 SUSPENSION ORAL at 13:50

## 2023-01-30 RX ADMIN — SODIUM CHLORIDE 250 ML: 9 INJECTION, SOLUTION INTRAVENOUS at 11:04

## 2023-01-30 RX ADMIN — MIDODRINE HYDROCHLORIDE 10 MG: 5 TABLET ORAL at 17:25

## 2023-01-30 RX ADMIN — RIFAXIMIN 550 MG: 550 TABLET ORAL at 22:25

## 2023-01-30 RX ADMIN — LACOSAMIDE 100 MG: 10 SOLUTION ORAL at 13:48

## 2023-01-30 RX ADMIN — LEVETIRACETAM 1000 MG: 100 SOLUTION ORAL at 22:24

## 2023-01-30 RX ADMIN — ACETAMINOPHEN 650 MG: 325 TABLET, FILM COATED ORAL at 08:57

## 2023-01-30 RX ADMIN — APIXABAN 5 MG: 5 TABLET, FILM COATED ORAL at 22:25

## 2023-01-30 RX ADMIN — APIXABAN 5 MG: 5 TABLET, FILM COATED ORAL at 08:33

## 2023-01-30 RX ADMIN — NYSTATIN 500000 UNITS: 100000 SUSPENSION ORAL at 17:25

## 2023-01-30 RX ADMIN — Medication: at 11:07

## 2023-01-30 RX ADMIN — LACTULOSE 20 G: 10 SOLUTION ORAL at 22:26

## 2023-01-30 RX ADMIN — LACTULOSE 20 G: 10 SOLUTION ORAL at 08:38

## 2023-01-30 RX ADMIN — LIDOCAINE 1 PATCH: 560 PATCH PERCUTANEOUS; TOPICAL; TRANSDERMAL at 08:38

## 2023-01-30 RX ADMIN — Medication 2.5 MG: at 17:25

## 2023-01-30 RX ADMIN — ONDANSETRON 4 MG: 2 INJECTION INTRAMUSCULAR; INTRAVENOUS at 13:49

## 2023-01-30 RX ADMIN — MIDODRINE HYDROCHLORIDE 10 MG: 5 TABLET ORAL at 08:33

## 2023-01-30 RX ADMIN — TACROLIMUS 1 MG: 5 CAPSULE ORAL at 13:48

## 2023-01-30 RX ADMIN — FLUOXETINE 10 MG: 10 CAPSULE ORAL at 08:33

## 2023-01-30 RX ADMIN — Medication 40 MG: at 17:25

## 2023-01-30 RX ADMIN — Medication 2.5 MG: at 04:52

## 2023-01-30 RX ADMIN — NYSTATIN 500000 UNITS: 100000 SUSPENSION ORAL at 22:26

## 2023-01-30 RX ADMIN — TACROLIMUS 1 MG: 5 CAPSULE ORAL at 17:26

## 2023-01-30 RX ADMIN — FOLIC ACID 1 MG: 1 TABLET ORAL at 08:33

## 2023-01-30 ASSESSMENT — ACTIVITIES OF DAILY LIVING (ADL)
ADLS_ACUITY_SCORE: 58
ADLS_ACUITY_SCORE: 64
ADLS_ACUITY_SCORE: 58
ADLS_ACUITY_SCORE: 64
ADLS_ACUITY_SCORE: 58

## 2023-01-30 NOTE — PROGRESS NOTES
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 On midodrine 10mg TID    2) Loculated pleural effusions    3) Anemia, last 8.5 on 1/25.     Plan/Recs:  1) HD today, goal UF 1kg  2) Next dialysis 2/1  3) If appropriate, readdressing goals of care with his sentiments of possible stopping dilaysis. May be having variable mentation and variable thoughts on goals of care.   4) Epo with HD       DO [facility]  Office: [phone]  Cell: [phone]        Interval History:      Pt seen in dialysis. Has sitter at bedside. Complaining of abd pain to me. Also mentions on a train of thoughts issues of:     Spouse wants to divorce him     Thinking about stopping dialysis       Denies any dyspnea.        Medications and Allergies:       - MEDICATION INSTRUCTIONS for Dialysis Patients -   Does not apply See Admin Instructions     sodium chloride 0.9%  250 mL Intravenous Once in dialysis/CRRT     sodium chloride 0.9%  300 mL Hemodialysis Machine Once     apixaban ANTICOAGULANT  5 mg Oral BID     FLUoxetine  10 mg Oral Daily     folic acid  1 mg Oral or Feeding Tube Daily     insulin aspart  1-7 Units Subcutaneous TID AC     insulin aspart  1-5 Units Subcutaneous At Bedtime     [Held by provider] insulin glargine  5 Units Subcutaneous QAM AC     lacosamide  100 mg Oral or Feeding Tube BID     lactulose  20 g Oral BID     levETIRAcetam  1,000 mg Oral or Feeding Tube Q24H     lidocaine  1 patch Transdermal Q24H     lidocaine   Transdermal Q8H BIJU     midodrine  10 mg Oral or Feeding Tube TID w/meals     mineral oil-hydrophilic petrolatum   Topical Daily     nystatin  500,000 Units Swish & Spit 4x Daily     pantoprazole  40 mg Oral or Feeding Tube BID AC     [Held by provider]  QUEtiapine  25 mg Oral At Bedtime     rifaximin  550 mg Oral or Feeding Tube BID     sodium chloride (PF)  10-30 mL Intracatheter Q8H     sodium chloride (PF)  3 mL Intracatheter Q8H     tacrolimus  1 mg Oral BID IS      No Known Allergies         Physical Exam:   Vitals were reviewed  /67   Pulse 112   Temp 97.9  F (36.6  C) (Oral)   Resp 18   Wt 86.4 kg (190 lb 7.6 oz)   SpO2 92%   BMI 25.83 kg/m      Wt Readings from Last 3 Encounters:   01/30/23 86.4 kg (190 lb 7.6 oz)   01/21/23 82.4 kg (181 lb 11.2 oz)   12/28/22 96 kg (211 lb 10.3 oz)       Intake/Output Summary (Last 24 hours) at 1/30/2023 1141  Last data filed at 1/30/2023 0000  Gross per 24 hour   Intake 1500 ml   Output 800 ml   Net 700 ml       GENERAL APPEARANCE: frail, weak appearing  HEENT:  Eyes/ears/nose/neck grossly normal  RESP: crackles at bases, right>left  CV: RRR, nl S1/S2  ABDOMEN: distended, soft. Rectal tube present  EXTREMITIES/SKIN: no c/c/rashes/lesions; 1+ dependent edema  NEURO:  Sleepy but conversant  LINES:  Right tunneled dialysis catheter present, no visible abnormalities           Data:     BMP  Recent Labs   Lab 01/30/23  0758 01/30/23  0512 01/30/23  0139 01/29/23  2121 01/28/23  0733 01/28/23  0609 01/25/23  1643 01/25/23  0522 01/24/23  1129 01/24/23  0715   NA  --  139  --   --   --  137  --  139  --  137   POTASSIUM  --  4.3  --   --   --  4.5  --  4.2  --  3.9   CHLORIDE  --  96*  --   --   --  97*  --  96*  --  99   KELLY  --  9.2  --   --   --  8.6  --  9.4  --  8.8   CO2  --  30*  --   --   --  32*  --  32*  --  31*   BUN  --  45.2*  --   --   --  23.6*  --  35.3*  --  27.0*   CR  --  3.99*  --   --   --  2.54*  --  3.26*  --  2.49*   * 111* 127* 141*   < > 93   < > 133*   < > 92    < > = values in this interval not displayed.     CBC  Recent Labs   Lab 01/25/23  0522 01/24/23  0715   WBC 10.7 9.5   HGB 8.5* 7.9*   HCT 29.1* 27.0*    98    209     Lab Results   Component Value Date    AST  19 01/28/2023    ALT 5 (L) 01/28/2023    ALKPHOS 235 (H) 01/28/2023    BILITOTAL 0.6 01/28/2023    CHANDLER 50 01/19/2023     Lab Results   Component Value Date    INR 1.36 (H) 12/21/2022       Attestation:  I have reviewed today's vital signs, notes, medications, labs and imaging.    DO [facility]rology  Office: [phone]  Cell: [phone]

## 2023-01-30 NOTE — PROGRESS NOTES
CLINICAL NUTRITION SERVICES - REASSESSMENT NOTE      Future/Additional Recommendations:   Continue current nutrition orders - patient and family are resistant to changes at this time   Continue to recommend supplemental nutrition via TF until oral intake improves - feel as though patient would do better with nocturnal vs bolus TF schedule    Malnutrition: (1/22)  % Weight Loss:  > 5% in 1 month (severe malnutrition)  % Intake:  </= 50% for >/= 5 days (severe malnutrition)- suspected   Subcutaneous Fat Loss:  Orbital region moderate depletion, Upper arm region moderate-severe depletion and Thoracic region moderate-severe depletion  Muscle Loss:  Temporal region moderate depletion, Clavicle bone region severe depletion, Acromion bone region severe depletion, Patellar region moderate depletion and Anterior thigh region moderate-severe depletion  Fluid Retention:  Trace     Malnutrition Diagnosis: Severe malnutrition  In Context of:  Acute on Chronic illness or disease       EVALUATION OF PROGRESS TOWARD GOALS   Diet:  Soft & Bite Sized Diet + Moderately Thick Liquids per SLP recommendations   Room Service w/ Assist     Supplements:   Breakfast - 4 oz Ensure  Lunch - Gelatein+  Dinner - Magic Cup    Nutrition Support - Enteral:    Type of Feeding Tube: G-tube  Enteral Frequency:  Continuous  Enteral Regimen: Novasource Renal at 80 mL/hr x 3 hours TID --> IF consumes < 50% of meal  Total Enteral Provisions: 240 mL formula TID or 720 mL/day = 1440 kcal, 66 g protein, 132 g CHO, 0 g fiber and 516 mL free water (meeting approx 58% estimated energy needs and 66% estimated protein needs)    Free Water Flush: 100 mL before and after each feeding    Intake/Tolerance:    TF -- Patient has been continuing to refuse TF. Last documented bolus x 1 on 1/24.   Oral -- Patient ate 50-75% of meals TID on 1/28 otherwise has only been consuming 0-25% at best.     Visited patient and spouse at bedside this afternoon. Brother, Dylan, was on  the phone as well. Family is very back and forth on TF at this point. Spouse feels as though patient needs to focus on eating more to recover his swallow muscles and doesn't want to focus on TF. Brother would like to see patient eat more but also receive supplemental nutrition via TF to continue gaining strength. I did give my opinion that patient is not eating enough to maintain his nutritional status and recover strength. Encouraged that we should be encouraging oral intake and ultimate goal is to be able to eat orally without the TF but we are not there at this point. However, did acknowledge that it is ultimately up to patient/family and what they want for his care. No clear answer on resolution or what they want at this point.     Labs reviewed - K+ 4.3, Mg 1.9, Phos 4.8 (H), BUN 45 (H), Cr 3.99 (H)  Recent Labs   Lab 01/30/23  0512 01/30/23  0139 01/29/23  2121 01/29/23  1720 01/29/23  1135 01/29/23  0738   * 127* 141* 132* 104* 131*     Meds - folic acid 1 mg/day, medium sliding scale insulin, lactulose BID  Stool -  1/29: 800 mL  1/28: x 0  1/27: x 3  1/26: 1200 mL    Weight - Fluctuations likely with fluid status 2/2 paracentesis and dialysis but do suspect some true weight loss as well given poor nutritional status.   Wt Readings from Last 10 Encounters:   01/30/23 86.4 kg (190 lb 7.6 oz)   01/21/23 82.4 kg (181 lb 11.2 oz)   12/28/22 96 kg (211 lb 10.3 oz)   12/16/22 100.2 kg (221 lb)   12/16/22 100.2 kg (221 lb)   12/15/22 87 kg (191 lb 12.8 oz)   10/07/19 137.5 kg (303 lb 3.2 oz)   10/04/19 131.5 kg (290 lb)   02/20/19 140.4 kg (309 lb 9.6 oz)   07/10/18 137.5 kg (303 lb 3.2 oz)         ASSESSED NUTRITION NEEDS:  Dosing Weight 82.4 kg   Estimated Energy Needs: 3924-5827 kcals (30-35 Kcal/Kg)  Justification: repletion and HD  Estimated Protein Needs: + grams protein (1.2-1.5+ g pro/Kg)  Justification: Repletion and dialysis  Estimated Fluid Needs: per provider pending fluid status        NEW  FINDINGS:   Palliative care consulted for GOC discussion - patient and family wish to continue restorative cares. Patient told provider he would agree to restart TF 1/26 though no TF intake documented thus far.   Plan dialysis run today     Discharge pending placement     Previous Goals:   EN + PO intake to meet % nutrition needs if GOC remain restorative   Evaluation: Not met    Previous Nutrition Diagnosis:   Inadequate protein-energy intake related to decreased appetite with early satiety/bloating/nausea and refusal of TF as evidenced by PO + EN intake meeting < 50% nutrition needs for the past week   Evaluation: No change      CURRENT NUTRITION DIAGNOSIS  Inadequate protein-energy intake related to decreased appetite with early satiety/bloating/nausea and refusal of TF as evidenced by PO + EN intake meeting < 50% nutrition needs for the ~2 weeks     INTERVENTIONS  Recommendations / Nutrition Prescription  Continue current nutrition orders - patient and family are resistant to changes at this time   Continue to recommend supplemental nutrition via TF until oral intake improves - feel as though patient would do better with nocturnal vs bolus TF schedule     Implementation  Nutrition Education - Discussed nutrition POC and recommendations as above with patient and spouse at bedside.   Collaboration and Referral of Nutrition Care - Discussed current status and recommendations with Dr. Chou today       Goals  EN + PO intake to meet % nutrition needs if GOC remain restorative       MONITORING AND EVALUATION:  Progress towards goals will be monitored and evaluated per protocol and Practice Guidelines      Sirena Gomez RD, LD

## 2023-01-30 NOTE — PROGRESS NOTES
Potassium   Date Value Ref Range Status   01/30/2023 4.3 3.4 - 5.3 mmol/L Final   12/29/2022 3.4 3.4 - 5.3 mmol/L Final   04/20/2020 3.9 3.4 - 5.3 mmol/L Final     Potassium POCT   Date Value Ref Range Status   01/19/2023 3.6 3.5 - 5.0 mmol/L Final     Hemoglobin   Date Value Ref Range Status   01/25/2023 8.5 (L) 13.3 - 17.7 g/dL Final   04/20/2020 14.3 13.3 - 17.7 g/dL Final     Creatinine   Date Value Ref Range Status   01/30/2023 3.99 (H) 0.67 - 1.17 mg/dL Final   04/20/2020 1.06 0.66 - 1.25 mg/dL Final     Urea Nitrogen   Date Value Ref Range Status   01/30/2023 45.2 (H) 6.0 - 20.0 mg/dL Final   12/29/2022 46 (H) 7 - 30 mg/dL Final   04/20/2020 23 7 - 30 mg/dL Final     Sodium   Date Value Ref Range Status   01/30/2023 139 136 - 145 mmol/L Final   04/20/2020 139 133 - 144 mmol/L Final     INR   Date Value Ref Range Status   12/21/2022 1.36 (H) 0.85 - 1.15 Final   10/07/2019 1.20 (H) 0.86 - 1.14 Final       DIALYSIS PROCEDURE NOTE  Hepatitis status of previous patient on machine log was checked and verified ok to use with this patients hepatitis status.  Patient dialyzed for 3.5 hrs. on a K3 bath with a net fluid removal of  0L.  A BFR of 350 ml/min was obtained via a r cvc     The treatment plan was discussed with Dr. Montaño during the treatment.    Total heparin received during the treatment: 0 units.     Line flushed, clamped and capped with heparin 1:1000  (1900 units) per lumen  Meds  given: Epo   Complications: Pt complained of Abdominal pain and wanted to stop HD. Pain meds admin per primary RN, MD examined and HD can continue      Person educated: pt. Knowledge base minimal. Barriers to learning: confusion. Educated on procedure via verbal mode. patient/family verbalized understanding.   ICEBOAT? Timeout performed pre-treatment  I: Patient was identified using 2 identifiers  C:  Consent Signed Yes  E: Equipment preventative maintenance is current and dialysis delivery system OK to use  B:   Latest  Reference Range & Units 01/12/23 06:21   Hep B Surface Agn Nonreactive  Nonreactive         Hepatitis B Surface Antibody: unk; Draw Date: unk  O: Dialysis orders present and complete prior to treatment  A: Vascular access verified and assessed prior to treatment  T: Treatment was performed at a clinically appropriate time  ?: Patient was allowed to ask questions and address concerns prior to treatment  See Adult Hemodialysis flowsheet in EPIC for further details and post assessment.  Machine water alar in place and functioning. Transducer pods intact and checked every 15min.   Pt returned via bed.  Chlorine/Chloramin water system checked every 4 hours.      Post treatment report given to Nereida Benítez RN regarding 0L of fluid removed, last BP      Jesse Kurtz RN

## 2023-01-30 NOTE — PLAN OF CARE
Goal Outcome Evaluation:  Pt is A&O to self only. whispers with communication. Sitter at bedside due to pulling of lines,dressing. Trach capped, dressing CDI.VSS on RA .c/o of abd discomfort,Pain managed with PRN tylenol,oxycodone. PICC intact,blood return noted. Peg tube in place-clamped.  Wound dressings CDI. Meplex to coccyx,spine and elbows.scattered skin bruising. Rectal tube in place with minimal output. Dialysis port to R chest-CDI. Anuria due to hemodialysis.  Assist x2 with lift, turn and repo. Diet soft and bite size diet with mild thick liquids.  Plan possible dischage to inpt rehab today.

## 2023-01-30 NOTE — PROGRESS NOTES
[facility] Nurse Inpatient Assessment     Consulted for: Coccyx    Patient History (according to provider note(s):      Blanco Osborne is a 57 year old male admitted on 1/21/2023 as a transfer from [facility] for evaluation of loculated pleural effusion. Patient has multiple medical problems including history of liver transplant 2016 due to alcohol abuse, pAF on chronic anticoagulation, history of DM2, CVA, HTN, CHRISTIAN on BiPAP. 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 Discharge to [facility], back to [facility] and back to [facility] during month of December.     Recently while at [facility] CT chest/abdomen/pelvis on 01/18 demonstrated concerns for infected parapneumonic effusions and SBP.  CT findings discussed with Pulmonology team with 3 different pulmonologist including (Dr. Monge, Dr. Chu and Dr. Jay) with agreement that patient needs chest tube and possible lytics and therefore transfer to higher level of care.     Areas Assessed:      Areas visualized during today's visit: Sacrum/coccyx    Wound location: coccyx    Last photo: Photo not saved 1/30  Wound due to: Pressure Injury   Superior coccyx and Left coccyx stage 2: healed 1/30   right coccyx DTPI vs Purpura (pt has significant purpura all over back, neck and arms).  Wound history/plan of care: 2 of the 3 small wounds have healed with one remaining over the coccyx. DTPI vs purpura POA. Pt laying on his left side during visit which he reports is the position he is most comfortable in.  Wound base:  Right coccyx 100 % non-blanchable purple erythema      Palpation of the wound bed: normal      Drainage: none     Description of drainage: none     Measurements (length x width x depth, in cm):R coccyx 0.7cm x 0.2cm x 0cm     Tunneling: N/A     Undermining:  "N/A  Periwound skin: Intact      Color: normal and consistent with surrounding tissue      Temperature: normal   Odor: none  Pain: pain noted with turning but not to wound area  Pain interventions prior to dressing change: patient tolerated well  Treatment goal: Heal  and Protection  STATUS: stable  Supplies ordered: at bedside and supplies stored on unit       Wound location: Midline low back    Last photo: 1/30/23        Wound due to: Skin Tear  Wound history/plan of care: Pt has purpura all over body and appears skin was torn open.   Wound base: 100 % dermis     Palpation of the wound bed: normal      Drainage: scant     Description of drainage: serosanguinous     Measurements (length x width x depth, in cm): 1.5  x 1.2  x  0.1 cm      Tunneling: N/A     Undermining: N/A  Periwound skin: Intact and scattered purpura      Color: normal and consistent with surrounding tissue      Temperature: normal   Odor: none  Pain: denies , none  Pain interventions prior to dressing change: N/A  Treatment goal: Heal   STATUS: initial assessment  Supplies ordered: at bedside     Nursing requested WOC also assess L elbow. Charting notes this to be an abrasion. During time off assessment pt asked family and nurses to leave and didn't want any further assessments done. WOC discussed basic care with nursing of adaptic and foam adhesive.     Treatment Plan:     Coccyx and low back wound(s): Every 3 days   1. Clean wound with saline or MicroKlenz Spray, pat dry  2. Wipe / \"clean\" the surrounding periwound tissue with skin prep (Cavilon No Sting Skin Prep #428649) and allow to dry. This will help protect periwound and help dressing adherence  3. Press a Mepilex  Sacral Dressing (PS#[id number])    to the area, making sure to conform nicely to skin curvatures.   4. Time and date dressing change  NOTE  -Reposition pt side to side preston when in bed, every 2 hours-get the pt way over on side to completely offload pressure. This will benefit skin " "and respiratory function   -Keep heels elevated and floating on pillows at all times. Try using at least 2 pillows under each calf.  -When up to the chair pt needs to fully offload every 2 hours and use a chair cushion if needed          Pressure Injury Prevention (PIP) Plan:  If patient is declining pressure injury prevention interventions: Explore reason why and address patient's concerns, Educate on pressure injury risk and prevention intervention(s), If patient is still declining, document \"informed refusal\"  and Ensure Care team is aware ( provider, charge nurse, etc)  Mattress: Follow bed algorithm, reassess daily and order specialty mattress, if indicated.  HOB: Maintain at or below 30 degrees, unless contraindicated  Repositioning in bed: Every 1-2 hours , Left/right positioning; avoid supine and Raise foot of bed prior to raising head of bed, to reduce patient sliding down (shear)  Heels: Keep elevated off mattress, Pillows under calves and Heel lift boots  Protective Dressing: Sacral Mepilex for prevention (#491543),  especially for the agitated patient   Positioning Equipment: None  Chair positioning: Chair cushion (#245194)  and Assist patient to reposition hourly   If patient has a buttock pressure injury, or high risk for PI use chair cushion or SPS.  Moisture Management: Perineal cleansing /protection: Follow Incontinence Protocol, Avoid brief in bed, Clean and dry skin folds with bathing , Consider InterDry (#575082) between folds and Moisturize dry skin  Under Devices: Inspect skin under all medical devices during skin inspection , Ensure tubes are stabilized without tension and Ensure patient is not lying on medical devices or equipment when repositioned  Ask provider to discontinue device when no longer needed.        Orders: Written    RECOMMEND PRIMARY TEAM ORDER: None, at this time  Education provided: importance of repositioning, plan of care, wound progress and Off-loading pressure  Discussed " plan of care with: Patient and Nurse  WOC nurse follow-up plan: weekly  Notify WOC if wound(s) deteriorate.  Nursing to notify the Provider(s) and re-consult the WOC Nurse if new skin concern.    DATA:     Current support surface: Standard  Low air loss (MIQUEL pump, Isolibrium, Pulsate, skin guard, etc)  Containment of urine/stool: Internal fecal management  BMI: Body mass index is 25.83 kg/m .   Active diet order: Orders Placed This Encounter      Soft & Bite Sized Diet (level 6) Liquidized/Moderately Thick (level 3)     Output: I/O last 3 completed shifts:  In: 1390 [I.V.:10; NG/GT:1140]  Out: 1250 [Stool:1250]     Labs:   Recent Labs   Lab 01/28/23  0609 01/25/23  0522   ALBUMIN 2.0*  --    HGB  --  8.5*   WBC  --  10.7     Pressure injury risk assessment:   Sensory Perception: 4-->no impairment  Moisture: 3-->occasionally moist  Activity: 2-->chairfast  Mobility: 2-->very limited  Nutrition: 2-->probably inadequate  Friction and Shear: 2-->potential problem  Kareem Score: 15    PETER Dorado: [facility] Nurse [Jacobo]  Dept. Office Number: [phone]

## 2023-01-30 NOTE — PLAN OF CARE
Goal Outcome Evaluation:      Plan of Care Reviewed With: patient, spouse, sibling    Overall Patient Progress: no changeOverall Patient Progress: no change    Outcome Evaluation: Patient has had ongoing poor oral intake, typically only taking bites of meals at best. Continues to refuse TF. See RD progress note for details and recommendations.    Sirena Gomez, DEANNA, LD

## 2023-01-30 NOTE — PROGRESS NOTES
PICC ok to use following pcxr today.  PICC tip still low SVC with 8cm external catheter.  Primary nursing updated.

## 2023-01-30 NOTE — PROGRESS NOTES
[facility]    Medicine Progress Note - Hospitalist Service    Date of Admission:  1/21/2023    Assessment & Plan   Blanco Osborne is a 57 year old male admitted on 1/21/2023 as a transfer from [facility] for evaluation of loculated pleural effusion. Patient has multiple medical problems including history of liver transplant 2016 due to alcohol abuse, pAF on chronic anticoagulation, history of DM2, CVA, HTN, CHRISTIAN on BiPAP. 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 Discharge to [facility], back to [facility] and back to [facility] during month of December.     Recently while at [facility] there were concerns for worsening effusions. He had thoracentesis 01/13 which returned exudative without growth on cultures. CT chest/abdomen/pelvis on 01/18 demonstrated worsening effusions and concerns for infected parapneumonic effusions with possible SBP.  CT findings discussed with Pulmonology team with 3 different pulmonologist including (Dr. Monge, Dr. Chu and Dr. Jay) with agreement that patient needs chest tube and possible lytics and therefore transfer to higher level of care.      See discharge summary 01/21/2023 for more details of his recent hx.      Bilateral pleural effusions, ? empyema  Acute now chronic respiratory failure requiring tracheostomy  - resolved    Pneumonia  - resolved   ARDS  - resolved    Right pneumothorax - resolved   Initial event was parainfluenza and strep pneumo pneumonia back in November 2022. Treated for ARDS. Had failed extubation x2 and tracheostomy performed last hospitalization.  Had additional complications of bilateral pneumothoraces as well as hydropneumothorax- pleural fluid grew candida parapsilosis. He was treated with IV micafungin and currently on fluconazole, to finish 4-week course. Chest  tube was placed and subsequently [facility]. His respiratory status is now stable on room air.   * Most recently, there has been concern for right complicated parapneumonic effusion. S/p thoracentesis 11/26 and 1/13.  Right thoracentesis 1/13 consistent with exudative effusion with elevated LDH, high neutrophils, cultures show NGTD. CT on 1/18 showed small bilateral partially loculated pleural effusions with associated pleural enhancement consistent with infected effusions. Pulmonary consultants at [facility] have recommended transfer to [facility] for consideration of chest tube placement and possible intra-pleural lytics. Dr. Howe with thoracic surgery consulted prior to transfer.   - CT chest ordered by Dr. Jackson 1/22, small effusions on imaging and no plans for a chest tube. Can consider a diagnostic thoracentesis however unclear utility. Patient had one at [facility] 01/13 which is exudative but cultures have been NGTD. Currently no s/s of worsening infection only elevated inflammatory markers. ID currently following, appreciate their input regarding need for thoracentesis and not starting antibiotics.  -Completed 4-week treatment for Candida.  - Continue mucomyst, albuterol and duonebs   - Tracheostomy care per RT  -Discussed with social work team to look into [facility] for transfer, patient can be transferred as early as 1/25.  1/25, nursing and mentioned that patient had refused to undergo dialysis and want to stop care, palliative care was consulted, on 1/26 had an extensive discussion with the patient and spouse in the room and they denied any wish to stop care or going to comfort measures, they are still full code and wished to pursue full care.  This was discussed with palliative care as well.  1/27 patient has a rectal tube and has lots of diarrhea, I did place lactulose on hold for now, will start it back depending on stool output, might be we could  remove the rectal tube, currently we are waiting for input from [facility] whether they would take him back or not,  his needs have come down since he was not receiving any tube feeding here and also he is not having any oxygen through his trach.    S/p Liver transplant 2016   Cirrhosis with ascites  Subacute bacterial peritonitis  Main manifestations of this are hepatic encephalopathy and ascites.  Hepatologist at [place] felt that most likely reason for the cirrhosis was metabolic syndrome or alcohol intake.  There was no evidence of rejection on biopsy and there was some evidence of regeneration. Paracentesis done on 12/22/2022 showed lactobacillus indicating SBP, treated with Unasyn and Augmentin, finished 2-week course 1/12/2023.  Requires large-volume paracentesis periodically for recurring ascites.    * Paracentesis 1/13/2023 yielded about 7500 cc. Cultures NGTD.    * Repeat CT abdomen/pelvis on 1/18 concerning for SBP given peritoneal enhancement.  * Repeat labs 1/19:  procalcitonin 1.64 from 1.81.  Ammonia 32.   -Due to abdominal distention will order a therapeutic paracentesis, will also order cell counts on it to rule out SBP but the patient did not have any symptoms of SBP.  - Continue intermittent paracentesis as needed if develops symptomatic ascites.    - Further treatment for recurrent ascites with TIPS deferred to his Liver Transplant team and/or Hepatology, he has an appointment on 4/21/2023 with Hepatology, Dr. Simms  - Continue Tacrolimus 1 mg BID.  - Continue Lactulose and Rifaximin   -Patient underwent another paracentesis on 1/25 with a 4.8 L removed,     Acute metabolic encephalopathy, recurrent  * several episodes of AMS. Generally it seems as though mentation waxes and wanes over a week or two period.  * AMS noted to be worsening 1/28-1/30. This coincides with lactulose being held for a day due to diarrhea, poor sleep, introduction of seroquel for sleep and discontinuation of  hydroxyzine and trazodone.   - hold seroquel to due AMS noted with introduction  - continue sitter as of 1/30 for AMS  - check ammonia, cbc, and blood cultures (does not make large amounts of urine, cxr with atelectatic consolidation in bases on 1/29, skin exam without clear cellulitis, port is non tender without tracking redness or fluctuance)     Diarrhea  Liquid stool requiring rectal tube. Patient transferred with this the rectal tube and unclear how long the liquid diarrhea has been going on. Foul smelling  - C. difficile negative, diarrhea present, first thought to initiate Imodium but later realized that we are giving him lactulose 20 mg twice daily, currently lactulose is on hold since 1/27, will have to start it back depending on the stool output since patient has underlying cirrhosis.     Paroxysmal atrial fibrillation  Remains in sinus rhythm.  On anticoagulation with apixaban indefinitely for stroke prophylaxis.    -Apixaban was on hold, will restart that.  - Cardiac monitoring       ESRD: Now on iHD.  No return of renal function.  - MWF schedule   - Nephrology consulted, patient is undergoing dialysis here as per schedule.     Acute anemia  Pt has required intermittent transfusions for on-going anemia.   -Anemia most likely secondary to critical illness/chronic disease, chronic renal disease  -Transfuse packed red blood cells to keep Hb> 7  -Monitor H&H.     History PEA arrest   PEA arrest prior to his previous admission and 1 episode of PEA associated with desaturation on 12/20.  No structural cardiac disease.   - Continue Cardiac Monitoring     Hypotension  Also has developed baseline hypotension 2/2 cirrhosis and prolonged critical illness.   -Continue current midodrine dose       Seizure after hypoxic event.  This is in the context of baseline multifactorial encephalopathy secondary to his ongoing critical illness and prior cardiac arrests and prior strokes and cirrhosis with hepatic encephalopathy.  MRI of head of 12/20/22 reveals no PRES or leptomeningeal enhancement but scattered.  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   - Continue with  Keppra and Vimpat       Diabetes mellitus type 2  -Lantus decreased to 5 units daily on 1/19, hold this for now due to low PO intake  -MDSSI  -Hypoglycemic protocol in place     Moderate malnutrition, protein and calorie type.  -Continue with tube feeds via PEG tube, patient refusing to eat or have TF restarted. He reports ongoing nausea and distention.  Calorie count has been initiated, discussed that with family and discussed that with patient in detail.  He would need at least bolus feeding with poor calorie counts, patient is refusing bolus feeding and tube feeding as such.  - Nutritionist consulted and following  Underwent another paracentesis on 1/24 with almost 5 L removed.    Anxiety  - start fluoxetine 1/28, consider dose increase in 2-3 weeks pending response       Diet: Soft & Bite Sized Diet (level 6) Liquidized/Moderately Thick (level 3)  Room Service  Adult Formula Bolus Feeding: Novasource Renal; Route: Gastrostomy; 3 times daily; Volume per Bolus: 240; mL(s); 80 mL/hr x 3 hrs back up bolus feeding after each meal ONLY IF INTAKE <50% of meal  Snacks/Supplements Adult: Other; 4 oz Ensure with breakfast, Gelatein+ with lunch and magic cup with dinner (RD); With Meals    DVT Prophylaxis: DOAC  Nixon Catheter: Not present  Lines: PRESENT      PICC 11/24/22 Triple Lumen Right Brachial vein medial Access-Site Assessment: WDL  CVC Double Lumen Right External jugular Tunneled-Site Assessment: WDL      Cardiac Monitoring: None  Code Status: Full Code      Clinically Significant Risk Factors              # Hypoalbuminemia: Lowest albumin = 1.9 g/dL at 1/23/2023  6:38 AM, will monitor as appropriate            # Severe Malnutrition: based on nutrition assessment        Disposition Plan     Expected Discharge Date:  01/30/2023,  3:00 PM  Discharge Delays: *Medically Ready for Discharge  Destination: inpatient rehabilitation facility  Discharge Comments: 1/25 Palliative consult. [facility] won't take pt.  1/27 placement, no more TF, on dialysis and not able to sit up          Michael Chou MD  Hospitalist Service  [facility]  Securely message with [employer] (more info)  Text page via [facility] Paging/Directory   ______________________________________________________________________    Interval History   Having soft stools.  Ongoing issues with pulling dressings and AMS overnight. Did start having sitter.  Got CXR yesterday afternoon due to pulling dressing and PICC being at new depth. CXR shows appropriate depth.   Patient seen this AM on dialysis. Was sleeping but woke to tell me he doesn't remember being confused, he is sleeping better. Difficult to get much more from him due to fatigue and soft voice in loud HD room.     Physical Exam   Vital Signs: Temp: 97.9  F (36.6  C) Temp src: Oral BP: 111/68 Pulse: 110   Resp: 18 SpO2: 92 % O2 Device: None (Room air)    Weight: 190 lbs 7.64 oz    Constitutional: Awakens to voice, alert, cooperative, no apparent distress. Very deconditioned.   Respiratory: Clear to auscultation bilaterally, no crackles or wheezing  Cardiovascular: Regular rate and rhythm, normal S1 and S2, and no murmur noted  GI: Normal bowel sounds, soft, non-distended, non-tender  Skin/Integumen: No rashes, no cyanosis, no edema  Other:       Medical Decision Making       50 MINUTES SPENT BY ME on the date of service doing chart review, history, exam, documentation & further activities per the note.      Data     I have personally reviewed the following data over the past 24 hrs:    N/A  \   N/A   / N/A     139 96 (L) 45.2 (H) /  115 (H)   4.3 30 (H) 3.99 (H) \       Imaging results reviewed over the past 24 hrs:   Recent Results (from the past 24 hour(s))   XR Chest Port 1 View    Narrative     EXAM: XR CHEST PORT 1 VIEW  LOCATION: [facility]  DATE/TIME: 1/29/2023 5:12 PM    INDICATION: Evaluate PICC positioning, patient pulled dressing  COMPARISON: 01/13/2023.      Impression    IMPRESSION:     Right PICC tip over the distal SVC. Right IJ catheter tip over the right atrium. Tracheostomy tube over the proximal trachea.    Hypoexpanded lungs. Mild basilar opacities, likely atelectasis. Small pleural effusions. No pneumothorax. Stable mildly enlarged cardiomediastinal silhouette.

## 2023-01-30 NOTE — PLAN OF CARE
Occupational Therapy: Orders received. Chart reviewed and discussed with care team.? Occupational Therapy not indicated due to IP therapy needs being met by PT. Pt has limited tolerance for therapy and discharge plan in place-returning back to [facility]. Defer discharge recommendations to PT and medical team.? Will complete OT orders to avoid duplication of services w/ PT.

## 2023-01-31 ENCOUNTER — APPOINTMENT (OUTPATIENT)
Dept: PHYSICAL THERAPY | Facility: CLINIC | Age: 58
DRG: 981 | End: 2023-01-31
Attending: STUDENT IN AN ORGANIZED HEALTH CARE EDUCATION/TRAINING PROGRAM
Payer: COMMERCIAL

## 2023-01-31 LAB
AMMONIA PLAS-SCNC: 44 UMOL/L (ref 16–60)
BACTERIA FLD CULT: NORMAL
ERYTHROCYTE [DISTWIDTH] IN BLOOD BY AUTOMATED COUNT: 19.5 % (ref 10–15)
GLUCOSE BLDC GLUCOMTR-MCNC: 103 MG/DL (ref 70–99)
GLUCOSE BLDC GLUCOMTR-MCNC: 130 MG/DL (ref 70–99)
GLUCOSE BLDC GLUCOMTR-MCNC: 92 MG/DL (ref 70–99)
HCT VFR BLD AUTO: 26.7 % (ref 40–53)
HGB BLD-MCNC: 7.7 G/DL (ref 13.3–17.7)
MCH RBC QN AUTO: 29.6 PG (ref 26.5–33)
MCHC RBC AUTO-ENTMCNC: 28.8 G/DL (ref 31.5–36.5)
MCV RBC AUTO: 103 FL (ref 78–100)
PLATELET # BLD AUTO: 164 10E3/UL (ref 150–450)
RBC # BLD AUTO: 2.6 10E6/UL (ref 4.4–5.9)
WBC # BLD AUTO: 11.5 10E3/UL (ref 4–11)

## 2023-01-31 PROCEDURE — 87040 BLOOD CULTURE FOR BACTERIA: CPT | Performed by: STUDENT IN AN ORGANIZED HEALTH CARE EDUCATION/TRAINING PROGRAM

## 2023-01-31 PROCEDURE — 250N000009 HC RX 250: Performed by: STUDENT IN AN ORGANIZED HEALTH CARE EDUCATION/TRAINING PROGRAM

## 2023-01-31 PROCEDURE — 120N000001 HC R&B MED SURG/OB

## 2023-01-31 PROCEDURE — 999N000040 HC STATISTIC CONSULT NO CHARGE VASC ACCESS

## 2023-01-31 PROCEDURE — 250N000013 HC RX MED GY IP 250 OP 250 PS 637

## 2023-01-31 PROCEDURE — 99233 SBSQ HOSP IP/OBS HIGH 50: CPT | Performed by: INTERNAL MEDICINE

## 2023-01-31 PROCEDURE — 250N000013 HC RX MED GY IP 250 OP 250 PS 637: Performed by: INTERNAL MEDICINE

## 2023-01-31 PROCEDURE — 87040 BLOOD CULTURE FOR BACTERIA: CPT | Performed by: INTERNAL MEDICINE

## 2023-01-31 PROCEDURE — 250N000012 HC RX MED GY IP 250 OP 636 PS 637: Performed by: STUDENT IN AN ORGANIZED HEALTH CARE EDUCATION/TRAINING PROGRAM

## 2023-01-31 PROCEDURE — 36415 COLL VENOUS BLD VENIPUNCTURE: CPT | Performed by: INTERNAL MEDICINE

## 2023-01-31 PROCEDURE — 82140 ASSAY OF AMMONIA: CPT | Performed by: STUDENT IN AN ORGANIZED HEALTH CARE EDUCATION/TRAINING PROGRAM

## 2023-01-31 PROCEDURE — 85027 COMPLETE CBC AUTOMATED: CPT | Performed by: STUDENT IN AN ORGANIZED HEALTH CARE EDUCATION/TRAINING PROGRAM

## 2023-01-31 PROCEDURE — 250N000013 HC RX MED GY IP 250 OP 250 PS 637: Performed by: STUDENT IN AN ORGANIZED HEALTH CARE EDUCATION/TRAINING PROGRAM

## 2023-01-31 PROCEDURE — 97110 THERAPEUTIC EXERCISES: CPT | Mod: GP

## 2023-01-31 PROCEDURE — 97530 THERAPEUTIC ACTIVITIES: CPT | Mod: GP

## 2023-01-31 PROCEDURE — 99232 SBSQ HOSP IP/OBS MODERATE 35: CPT | Performed by: INTERNAL MEDICINE

## 2023-01-31 RX ORDER — HYDROXYZINE HYDROCHLORIDE 10 MG/1
10 TABLET, FILM COATED ORAL 3 TIMES DAILY PRN
Status: DISCONTINUED | OUTPATIENT
Start: 2023-01-31 | End: 2023-02-02

## 2023-01-31 RX ADMIN — NYSTATIN 500000 UNITS: 100000 SUSPENSION ORAL at 17:16

## 2023-01-31 RX ADMIN — Medication 40 MG: at 17:39

## 2023-01-31 RX ADMIN — Medication 2.5 MG: at 02:07

## 2023-01-31 RX ADMIN — TACROLIMUS 1 MG: 5 CAPSULE ORAL at 11:00

## 2023-01-31 RX ADMIN — FOLIC ACID 1 MG: 1 TABLET ORAL at 08:20

## 2023-01-31 RX ADMIN — Medication 6 MG: at 21:18

## 2023-01-31 RX ADMIN — APIXABAN 5 MG: 5 TABLET, FILM COATED ORAL at 08:21

## 2023-01-31 RX ADMIN — LIDOCAINE 1 PATCH: 560 PATCH PERCUTANEOUS; TOPICAL; TRANSDERMAL at 08:21

## 2023-01-31 RX ADMIN — MIDODRINE HYDROCHLORIDE 10 MG: 5 TABLET ORAL at 12:52

## 2023-01-31 RX ADMIN — LEVETIRACETAM 1000 MG: 100 SOLUTION ORAL at 22:46

## 2023-01-31 RX ADMIN — LACOSAMIDE 100 MG: 10 SOLUTION ORAL at 11:00

## 2023-01-31 RX ADMIN — Medication 2.5 MG: at 20:00

## 2023-01-31 RX ADMIN — NYSTATIN 500000 UNITS: 100000 SUSPENSION ORAL at 20:00

## 2023-01-31 RX ADMIN — MIDODRINE HYDROCHLORIDE 10 MG: 5 TABLET ORAL at 08:21

## 2023-01-31 RX ADMIN — MIDODRINE HYDROCHLORIDE 10 MG: 5 TABLET ORAL at 17:17

## 2023-01-31 RX ADMIN — LACTULOSE 20 G: 10 SOLUTION ORAL at 20:00

## 2023-01-31 RX ADMIN — ACETAMINOPHEN 650 MG: 325 TABLET, FILM COATED ORAL at 20:00

## 2023-01-31 RX ADMIN — Medication 2.5 MG: at 10:57

## 2023-01-31 RX ADMIN — LACTULOSE 20 G: 10 SOLUTION ORAL at 08:20

## 2023-01-31 RX ADMIN — HYDROXYZINE HYDROCHLORIDE 10 MG: 10 TABLET ORAL at 17:39

## 2023-01-31 RX ADMIN — APIXABAN 5 MG: 5 TABLET, FILM COATED ORAL at 20:00

## 2023-01-31 RX ADMIN — Medication 40 MG: at 08:20

## 2023-01-31 RX ADMIN — LACOSAMIDE 100 MG: 10 SOLUTION ORAL at 21:18

## 2023-01-31 RX ADMIN — NYSTATIN 500000 UNITS: 100000 SUSPENSION ORAL at 08:20

## 2023-01-31 RX ADMIN — RIFAXIMIN 550 MG: 550 TABLET ORAL at 20:00

## 2023-01-31 RX ADMIN — WHITE PETROLATUM: 1.75 OINTMENT TOPICAL at 11:00

## 2023-01-31 RX ADMIN — FLUOXETINE 10 MG: 10 CAPSULE ORAL at 08:20

## 2023-01-31 RX ADMIN — RIFAXIMIN 550 MG: 550 TABLET ORAL at 08:20

## 2023-01-31 RX ADMIN — TACROLIMUS 1 MG: 5 CAPSULE ORAL at 20:01

## 2023-01-31 RX ADMIN — NYSTATIN 500000 UNITS: 100000 SUSPENSION ORAL at 12:52

## 2023-01-31 ASSESSMENT — ACTIVITIES OF DAILY LIVING (ADL)
ADLS_ACUITY_SCORE: 64

## 2023-01-31 NOTE — PROGRESS NOTES
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 On midodrine 10mg TID     2) Loculated pleural effusions     3) Anemia, last 8.5 on 1/25.      Other:  Hx liver transplant 2016  Confusion/encephlopathy  DM    Plan/Recs:  1) HD tomorrow per chronic MWF schedule, attempt again goal UF 1kg  2) Epo with HD         Torsten Montaño DO  [facility] consultants  Office: [phone]  Cell: [phone]        Interval History:     S/p dialysis yesterday, ran 3.5 hrs on 3K bath. No fluid removed due to hyptension, reported abd pains during run.    Today patient confused, wearing  [place] on hands. States he is being held a prisoner. Able to distract him and he denied pain, dyspnea. BP higher this am, 127 systolic.           Medications and Allergies:       - MEDICATION INSTRUCTIONS for Dialysis Patients -   Does not apply See Admin Instructions     apixaban ANTICOAGULANT  5 mg Oral BID     FLUoxetine  10 mg Oral Daily     folic acid  1 mg Oral or Feeding Tube Daily     insulin aspart  1-7 Units Subcutaneous TID AC     insulin aspart  1-5 Units Subcutaneous At Bedtime     [Held by provider] insulin glargine  5 Units Subcutaneous QAM AC     lacosamide  100 mg Oral or Feeding Tube BID     lactulose  20 g Oral BID     levETIRAcetam  1,000 mg Oral or Feeding Tube Q24H     lidocaine  1 patch Transdermal Q24H     lidocaine   Transdermal Q8H BIJU     midodrine  10 mg Oral or Feeding Tube TID w/meals     mineral oil-hydrophilic petrolatum   Topical Daily     nystatin  500,000 Units Swish & Spit 4x Daily     pantoprazole  40 mg Oral or Feeding Tube BID AC     [Held by provider] QUEtiapine  25 mg Oral At Bedtime     rifaximin  550 mg Oral or Feeding Tube BID     sodium chloride (PF)  10-30 mL Intracatheter Q8H      sodium chloride (PF)  3 mL Intracatheter Q8H     tacrolimus  1 mg Oral BID IS      No Known Allergies         Physical Exam:   Vitals were reviewed  /71 (BP Location: Left arm)   Pulse 109   Temp 97.7  F (36.5  C) (Oral)   Resp 16   Wt 84.5 kg (186 lb 4.6 oz)   SpO2 90%   BMI 25.27 kg/m      Wt Readings from Last 3 Encounters:   01/31/23 84.5 kg (186 lb 4.6 oz)   01/21/23 82.4 kg (181 lb 11.2 oz)   12/28/22 96 kg (211 lb 10.3 oz)       Intake/Output Summary (Last 24 hours) at 1/31/2023 1251  Last data filed at 1/31/2023 1203  Gross per 24 hour   Intake 910 ml   Output 450 ml   Net 460 ml       GENERAL APPEARANCE: frail, weak appearing  HEENT:  Eyes/ears/nose/neck grossly normal  RESP: crackles at bases, right>left  CV: RRR, nl S1/S2  ABDOMEN: distended, soft. Rectal tube present  EXTREMITIES/SKIN: no c/c/rashes/lesions; 1+ dependent edema  NEURO:  Sleepy but conversant  LINES:  Right tunneled dialysis catheter present, no visible abnormalities         Data:     BMP  Recent Labs   Lab 01/31/23  1128 01/31/23  0848 01/30/23  2232 01/30/23  1730 01/30/23  0758 01/30/23  0512 01/28/23  0733 01/28/23  0609 01/25/23  1643 01/25/23  0522   NA  --   --   --   --   --  139  --  137  --  139   POTASSIUM  --   --   --   --   --  4.3  --  4.5  --  4.2   CHLORIDE  --   --   --   --   --  96*  --  97*  --  96*   KELLY  --   --   --   --   --  9.2  --  8.6  --  9.4   CO2  --   --   --   --   --  30*  --  32*  --  32*   BUN  --   --   --   --   --  45.2*  --  23.6*  --  35.3*   CR  --   --   --   --   --  3.99*  --  2.54*  --  3.26*   * 92 116* 123*   < > 111*   < > 93   < > 133*    < > = values in this interval not displayed.     CBC  Recent Labs   Lab 01/25/23  0522   WBC 10.7   HGB 8.5*   HCT 29.1*           Lab Results   Component Value Date    AST 19 01/28/2023    ALT 5 (L) 01/28/2023    ALKPHOS 235 (H) 01/28/2023    BILITOTAL 0.6 01/28/2023    CHANDLER 50 01/19/2023     Lab Results   Component  Value Date    INR 1.36 (H) 12/21/2022       Attestation:  I have reviewed today's vital signs, notes, medications, labs and imaging.    DO [facility] - Nephrology  Office: [phone]  Cell: [phone]

## 2023-01-31 NOTE — PROGRESS NOTES
[facility]    Medicine Progress Note - Hospitalist Service    Date of Admission:  1/21/2023    Assessment & Plan   Blanco Osborne is a 57 year old male admitted on 1/21/2023 as a transfer from [facility] for evaluation of loculated pleural effusion. Patient has multiple medical problems including history of liver transplant 2016 due to alcohol abuse, pAF on chronic anticoagulation, history of DM2, CVA, HTN, CHRISTIAN on BiPAP. 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 Discharge to [facility], back to [facility] and back to [facility] during month of December.     Recently while at [facility] there were concerns for worsening effusions. He had thoracentesis 01/13 which returned exudative without growth on cultures. CT chest/abdomen/pelvis on 01/18 demonstrated worsening effusions and concerns for infected parapneumonic effusions with possible SBP.  CT findings discussed with Pulmonology team with 3 different pulmonologist including (Dr. Monge, Dr. Chu and Dr. Jay) with agreement recommended transfer to [facility] for consideration of chest tube placement and possible intrapleural lysis.See discharge summary 01/21/2023 for more details of his recent hx.     Acute metabolic encephalopathy, recurrent  * several episodes of AMS. Generally it seems as though mentation waxes and wanes over a week or two period.  * AMS noted to be worsening since 1/28.  This coincides with lactulose being held for a day due to diarrhea, poor sleep, introduction of seroquel for sleep and discontinuation of hydroxyzine and trazodone.   - hold seroquel to due AMS noted with introduction  -Continue sitter  -  ammonia, cbc, and blood cultures were ordered yesterday, however has not been drawn yet, discussed with nursing, following labs     Bilateral pleural  effusions, ? empyema  Acute now chronic respiratory failure requiring tracheostomy  - resolved    Pneumonia  - resolved   ARDS  - resolved    Right pneumothorax - resolved   Initial event was parainfluenza and strep pneumo pneumonia back in November 2022. Treated for ARDS. Had failed extubation x2 and tracheostomy performed last hospitalization.  Had additional complications of bilateral pneumothoraces as well as hydropneumothorax- pleural fluid grew candida parapsilosis  -Has already completed 4-week antifungal treatment. Chest tube was placed and subsequently removed. While in LTACH he was successfully weaned from the ventilator. His respiratory status is now stable on room air.   * Most recently, there has been concern for right complicated parapneumonic effusion. S/p thoracentesis 11/26 and 1/13.  Right thoracentesis 1/13 consistent with exudative effusion with elevated LDH, high neutrophils, cultures show NGTD. CT on 1/18 showed small bilateral partially loculated pleural effusions with associated pleural enhancement consistent with infected effusions.   -Evaluated by thoracic surgery(Dr. Jackson)  - CT chest 1/22, small effusions on imaging and no plans for a chest tube.   - Continue mucomyst, albuterol and duonebs   - Tracheostomy care per RT    Goals of care   As per report on 1/25 nursing had mentioned that patient had refused to undergo dialysis and want to stop care, palliative care was consulted.  Patient and his spouse denied wanting to stop care and wanted ongoing restorative care including full code     S/p Liver transplant 2016   Cirrhosis with ascites  Subacute bacterial peritonitis  Main manifestations of this are hepatic encephalopathy and ascites.  Hepatologist at [facility] felt that most likely reason for the cirrhosis was metabolic syndrome or alcohol intake.  There was no evidence of rejection on biopsy and there was some evidence of regeneration. Paracentesis done on 12/22/2022 showed  lactobacillus indicating SBP, treated with Unasyn and Augmentin, finished 2-week course 1/12/2023.  Requires large-volume paracentesis periodically for recurring ascites.    * Paracentesis 1/13/2023 yielded about 7500 cc. Cultures NGTD.  Most recent paracentesis on 1/24 with 4.8 L fluid removal  - Continue intermittent paracentesis as needed if develops symptomatic ascites.    - Further treatment for recurrent ascites with TIPS deferred to his Liver Transplant team and/or Hepatology, he has an appointment on 4/21/2023 with Hepatology, Dr. Simms  - Continue Tacrolimus 1 mg BID.  - Continue Lactulose and Rifaximin      Diarrhea  Liquid stool requiring rectal tube. Patient transferred with the rectal tube   - C. difficile negative, diarrhea present, patient on scheduled lactulose for his cirrhosis     Paroxysmal atrial fibrillation  Remains in sinus rhythm.  On anticoagulation with apixaban indefinitely for stroke prophylaxis.    - Cardiac monitoring       ESRD: Now on iHD.  No return of renal function.  - MWF schedule   - Nephrology following for continuation of dialysis     Acute anemia  Pt has required intermittent transfusions for on-going anemia.   -Anemia most likely secondary to critical illness/chronic disease, chronic renal disease  -Transfuse packed red blood cells to keep Hb> 7  -Monitor H&H.     History PEA arrest   PEA arrest prior to his previous admission and 1 episode of PEA associated with desaturation on 12/20.  No structural cardiac disease.   - Continue Cardiac Monitoring     Hypotension  Also has developed baseline hypotension 2/2 cirrhosis and prolonged critical illness.   -Continue current midodrine dose       Seizure after hypoxic event.  This is in the context of baseline multifactorial encephalopathy secondary to his ongoing critical illness and prior cardiac arrests and prior strokes and cirrhosis with hepatic encephalopathy. MRI of head of 12/20/22 reveals no PRES or leptomeningeal  enhancement but scattered.  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   - Continue with  Keppra and Vimpat       Diabetes mellitus type 2  -Hemoglobin A1c on November 2022 was 4.7  -PTA looks like was on 30 units of Lantus every morning, currently only on MDSSI  -Hypoglycemic protocol in place  -Blood sugar over the last 24 hours reasonable, less than 150 on most readings  -We will continue with only sliding scale for now, monitor and adjust medication as needed     Moderate malnutrition, protein and calorie type.  -Continue with tube feeds via PEG tube, patient refusing to eat or have TF restarted. He reports ongoing nausea and distention.  Calorie count has been initiated, discussed that with family and discussed that with patient in detail.  He would need at least bolus feeding with poor calorie counts, patient is refusing bolus feeding and tube feeding as such.  - Nutritionist consulted and following     Anxiety  - start fluoxetine 1/28, consider dose increase in 2-3 weeks pending response       Diet: Soft & Bite Sized Diet (level 6) Liquidized/Moderately Thick (level 3)  Room Service  Adult Formula Bolus Feeding: Novasource Renal; Route: Gastrostomy; 3 times daily; Volume per Bolus: 240; mL(s); 80 mL/hr x 3 hrs back up bolus feeding after each meal ONLY IF INTAKE <50% of meal  Snacks/Supplements Adult: Other; 4 oz Ensure with breakfast, Gelatein+ with lunch and magic cup with dinner (RD); With Meals    DVT Prophylaxis: DOAC  Nixon Catheter: Not present  Lines: PRESENT      PICC 11/24/22 Triple Lumen Right Brachial vein medial Access-Site Assessment: WDL  CVC Double Lumen Right External jugular Tunneled-Site Assessment: WDL      Cardiac Monitoring: None  Code Status: Full Code      Clinically Significant Risk Factors              # Hypoalbuminemia: Lowest albumin = 1.9 g/dL at 1/23/2023  6:38 AM, will monitor as appropriate            # Severe Malnutrition:  based on nutrition assessment        Disposition Plan     Expected Discharge Date: 02/01/2023,  3:00 PM    Destination: inpatient rehabilitation facility  Discharge Comments: 1/25 Palliative consult. [facility] won't take pt.  1/27 placement, no more TF, on dialysis and not able to sit up          Rylie Jenkins MD  Hospitalist Service  [facility]  Securely message with [employer] (more info)  Text page via [employer] Paging/Directory   ______________________________________________________________________    Interval History   Patient continues to be confused, agitated and has mitts in place, wants them removed.  Discussed with nursing.  Chart reviewed.  Also discussed with care coordinator.    Physical Exam   Vital Signs: Temp: 97.8  F (36.6  C) Temp src: Oral BP: 100/67 Pulse: 107   Resp: 18 SpO2: 97 % O2 Device: None (Room air)    Weight: 186 lbs 4.62 oz    Exam:  Constitutional: Confused, not redirectable  Head: Normocephalic. No masses, lesions, tenderness or abnormalities  ENT: ENT exam normal, no neck nodes or sinus tenderness  Cardiovascular: RRR.  No murmurs, no rubs or JVD  Respiratory: Normal WOB,b/l equal air entry, no wheezes or crackles   Gastrointestinal: Abdomen soft, non-tender. BS normal. No masses, organomegaly  : Deferred  Extremities : No edema , no clubbing or cyanosis        Medical Decision Making       55 MINUTES SPENT BY ME on the date of service doing chart review, history, exam, documentation & further activities per the note.      Data         Imaging results reviewed over the past 24 hrs:   No results found for this or any previous visit (from the past 24 hour(s)).  Recent Labs   Lab 01/31/23  1128 01/31/23  0848 01/30/23  2232 01/30/23  0758 01/30/23  0512 01/28/23  0733 01/28/23  0609 01/25/23  1643 01/25/23  0522   WBC  --   --   --   --   --   --   --   --  10.7   HGB  --   --   --   --   --   --   --   --  8.5*   MCV  --   --   --   --   --   --   --   --  100   PLT   --   --   --   --   --   --   --   --  230   NA  --   --   --   --  139  --  137  --  139   POTASSIUM  --   --   --   --  4.3  --  4.5  --  4.2   CHLORIDE  --   --   --   --  96*  --  97*  --  96*   CO2  --   --   --   --  30*  --  32*  --  32*   BUN  --   --   --   --  45.2*  --  23.6*  --  35.3*   CR  --   --   --   --  3.99*  --  2.54*  --  3.26*   ANIONGAP  --   --   --   --  13  --  8  --  11   KELLY  --   --   --   --  9.2  --  8.6  --  9.4   * 92 116*   < > 111*   < > 93   < > 133*   ALBUMIN  --   --   --   --   --   --  2.0*  --   --    PROTTOTAL  --   --   --   --   --   --  6.4  --   --    BILITOTAL  --   --   --   --   --   --  0.6  --   --    ALKPHOS  --   --   --   --   --   --  235*  --   --    ALT  --   --   --   --   --   --  5*  --   --    AST  --   --   --   --   --   --  19  --   --     < > = values in this interval not displayed.

## 2023-01-31 NOTE — PROGRESS NOTES
Care Management Follow Up    Length of Stay (days): 10    Expected Discharge Date: 02/03/2023     Concerns to be Addressed: adjustment to diagnosis/illness, care coordination/care conferences, discharge planning     Patient plan of care discussed at interdisciplinary rounds: Yes    Anticipated Discharge Disposition: LTACH     Anticipated Discharge Services:    Anticipated Discharge DME:      Patient/family educated on Medicare website which has current facility and service quality ratings:    Education Provided on the Discharge Plan:    Patient/Family in Agreement with the Plan: yes    Referrals Placed by CM/SW:    Private pay costs discussed: Not applicable    Additional Information:  [facility]. SW will continue to follow      ADILENE Chacko

## 2023-01-31 NOTE — PLAN OF CARE
A&Ox2-3, confused, forgetful. Noted episodes of restlessness, reoriented. VSS on RA. Tachycardic at times. Turned and repositioned. Sitter at bedside. Tracheostomy capped per RT. LS diminished. BS active. Continues with soft and bite sized diet with mod thickened liquids. G-tube to enteral feeding with parameters. With rectal tube. Low to no urine output - on HD, next HD on Wednesday. R HD access. PICC to R on SL Pain controlled by tylenol and oxycodone. Continues with wound care.

## 2023-01-31 NOTE — PLAN OF CARE
Goal Outcome Evaluation:       A&O x's 2. VSS - soft BP's, tachy, on R/A. Lungs sounds - clear/diminished. Bowel Sounds - normoactive, pulled rectal tube after it was stool was pushed out due to a more solid stool. Inserted new rectal tube at 1800. Urine Output - inadequate. Trach capped, changed split gauze during shift.Wounds - covered in mepilex on coccyx, left elbow and mid back. Ambulation - total dependency with c-lift. Diet thickened liquids, soft food and .Pain controlled by PRN oxy and tylenol

## 2023-02-01 LAB
FERRITIN SERPL-MCNC: 502 NG/ML (ref 31–409)
GLUCOSE BLDC GLUCOMTR-MCNC: 124 MG/DL (ref 70–99)
GLUCOSE BLDC GLUCOMTR-MCNC: 132 MG/DL (ref 70–99)
GLUCOSE BLDC GLUCOMTR-MCNC: 165 MG/DL (ref 70–99)
IRON BINDING CAPACITY (ROCHE): 96 UG/DL (ref 240–430)
IRON SATN MFR SERPL: 30 % (ref 15–46)
IRON SERPL-MCNC: 29 UG/DL (ref 61–157)

## 2023-02-01 PROCEDURE — 99233 SBSQ HOSP IP/OBS HIGH 50: CPT | Performed by: INTERNAL MEDICINE

## 2023-02-01 PROCEDURE — 999N000157 HC STATISTIC RCP TIME EA 10 MIN

## 2023-02-01 PROCEDURE — 250N000009 HC RX 250: Performed by: STUDENT IN AN ORGANIZED HEALTH CARE EDUCATION/TRAINING PROGRAM

## 2023-02-01 PROCEDURE — 250N000013 HC RX MED GY IP 250 OP 250 PS 637: Performed by: STUDENT IN AN ORGANIZED HEALTH CARE EDUCATION/TRAINING PROGRAM

## 2023-02-01 PROCEDURE — 634N000001 HC RX 634: Performed by: INTERNAL MEDICINE

## 2023-02-01 PROCEDURE — 250N000013 HC RX MED GY IP 250 OP 250 PS 637: Performed by: INTERNAL MEDICINE

## 2023-02-01 PROCEDURE — 250N000013 HC RX MED GY IP 250 OP 250 PS 637

## 2023-02-01 PROCEDURE — 250N000012 HC RX MED GY IP 250 OP 636 PS 637: Performed by: STUDENT IN AN ORGANIZED HEALTH CARE EDUCATION/TRAINING PROGRAM

## 2023-02-01 PROCEDURE — 258N000003 HC RX IP 258 OP 636: Performed by: INTERNAL MEDICINE

## 2023-02-01 PROCEDURE — 90937 HEMODIALYSIS REPEATED EVAL: CPT

## 2023-02-01 PROCEDURE — 120N000001 HC R&B MED SURG/OB

## 2023-02-01 PROCEDURE — 99232 SBSQ HOSP IP/OBS MODERATE 35: CPT | Performed by: INTERNAL MEDICINE

## 2023-02-01 PROCEDURE — 250N000011 HC RX IP 250 OP 636: Performed by: STUDENT IN AN ORGANIZED HEALTH CARE EDUCATION/TRAINING PROGRAM

## 2023-02-01 RX ADMIN — ACETAMINOPHEN 650 MG: 325 TABLET, FILM COATED ORAL at 12:39

## 2023-02-01 RX ADMIN — LACTULOSE 20 G: 10 SOLUTION ORAL at 08:17

## 2023-02-01 RX ADMIN — Medication 2.5 MG: at 09:48

## 2023-02-01 RX ADMIN — LACTULOSE 20 G: 10 SOLUTION ORAL at 20:52

## 2023-02-01 RX ADMIN — WHITE PETROLATUM: 1.75 OINTMENT TOPICAL at 09:50

## 2023-02-01 RX ADMIN — ONDANSETRON 4 MG: 4 TABLET, ORALLY DISINTEGRATING ORAL at 22:32

## 2023-02-01 RX ADMIN — HYDROXYZINE HYDROCHLORIDE 10 MG: 10 TABLET ORAL at 00:40

## 2023-02-01 RX ADMIN — Medication 40 MG: at 08:20

## 2023-02-01 RX ADMIN — FOLIC ACID 1 MG: 1 TABLET ORAL at 08:18

## 2023-02-01 RX ADMIN — MIDODRINE HYDROCHLORIDE 10 MG: 5 TABLET ORAL at 17:41

## 2023-02-01 RX ADMIN — SODIUM CHLORIDE 250 ML: 9 INJECTION, SOLUTION INTRAVENOUS at 13:57

## 2023-02-01 RX ADMIN — LACOSAMIDE 100 MG: 10 SOLUTION ORAL at 22:19

## 2023-02-01 RX ADMIN — FLUOXETINE 10 MG: 10 CAPSULE ORAL at 08:19

## 2023-02-01 RX ADMIN — RIFAXIMIN 550 MG: 550 TABLET ORAL at 08:18

## 2023-02-01 RX ADMIN — APIXABAN 5 MG: 5 TABLET, FILM COATED ORAL at 20:42

## 2023-02-01 RX ADMIN — LIDOCAINE 1 PATCH: 560 PATCH PERCUTANEOUS; TOPICAL; TRANSDERMAL at 08:19

## 2023-02-01 RX ADMIN — MIDODRINE HYDROCHLORIDE 10 MG: 5 TABLET ORAL at 14:21

## 2023-02-01 RX ADMIN — Medication: at 13:57

## 2023-02-01 RX ADMIN — LEVETIRACETAM 1000 MG: 100 SOLUTION ORAL at 22:19

## 2023-02-01 RX ADMIN — TACROLIMUS 1 MG: 5 CAPSULE ORAL at 08:17

## 2023-02-01 RX ADMIN — EPOETIN ALFA-EPBX 10000 UNITS: 10000 INJECTION, SOLUTION INTRAVENOUS; SUBCUTANEOUS at 13:59

## 2023-02-01 RX ADMIN — MIDODRINE HYDROCHLORIDE 10 MG: 5 TABLET ORAL at 08:18

## 2023-02-01 RX ADMIN — SODIUM CHLORIDE 300 ML: 9 INJECTION, SOLUTION INTRAVENOUS at 13:56

## 2023-02-01 RX ADMIN — Medication 40 MG: at 17:41

## 2023-02-01 RX ADMIN — ACETAMINOPHEN 650 MG: 325 TABLET, FILM COATED ORAL at 00:40

## 2023-02-01 RX ADMIN — APIXABAN 5 MG: 5 TABLET, FILM COATED ORAL at 08:18

## 2023-02-01 RX ADMIN — HYDROXYZINE HYDROCHLORIDE 10 MG: 10 TABLET ORAL at 17:41

## 2023-02-01 RX ADMIN — RIFAXIMIN 550 MG: 550 TABLET ORAL at 20:42

## 2023-02-01 RX ADMIN — NYSTATIN 500000 UNITS: 100000 SUSPENSION ORAL at 08:17

## 2023-02-01 RX ADMIN — TACROLIMUS 1 MG: 5 CAPSULE ORAL at 17:41

## 2023-02-01 RX ADMIN — LACOSAMIDE 100 MG: 10 SOLUTION ORAL at 08:17

## 2023-02-01 ASSESSMENT — ACTIVITIES OF DAILY LIVING (ADL)
ADLS_ACUITY_SCORE: 66
ADLS_ACUITY_SCORE: 64
ADLS_ACUITY_SCORE: 66
ADLS_ACUITY_SCORE: 66
ADLS_ACUITY_SCORE: 64
ADLS_ACUITY_SCORE: 66
ADLS_ACUITY_SCORE: 64
ADLS_ACUITY_SCORE: 66

## 2023-02-01 NOTE — PROGRESS NOTES
Potassium   Date Value Ref Range Status   01/30/2023 4.3 3.4 - 5.3 mmol/L Final   12/29/2022 3.4 3.4 - 5.3 mmol/L Final   04/20/2020 3.9 3.4 - 5.3 mmol/L Final     Potassium POCT   Date Value Ref Range Status   01/19/2023 3.6 3.5 - 5.0 mmol/L Final     Hemoglobin   Date Value Ref Range Status   01/31/2023 7.7 (L) 13.3 - 17.7 g/dL Final   04/20/2020 14.3 13.3 - 17.7 g/dL Final     Creatinine   Date Value Ref Range Status   01/30/2023 3.99 (H) 0.67 - 1.17 mg/dL Final   04/20/2020 1.06 0.66 - 1.25 mg/dL Final     Urea Nitrogen   Date Value Ref Range Status   01/30/2023 45.2 (H) 6.0 - 20.0 mg/dL Final   12/29/2022 46 (H) 7 - 30 mg/dL Final   04/20/2020 23 7 - 30 mg/dL Final     Sodium   Date Value Ref Range Status   01/30/2023 139 136 - 145 mmol/L Final   04/20/2020 139 133 - 144 mmol/L Final     INR   Date Value Ref Range Status   12/21/2022 1.36 (H) 0.85 - 1.15 Final   10/07/2019 1.20 (H) 0.86 - 1.14 Final       DIALYSIS PROCEDURE NOTE  Hepatitis status of previous patient on machine log was checked and verified ok to use with this patients hepatitis status.  Patient dialyzed for 3.5 hrs. on a K3 bath with a net fluid removal of  1L.  A BFR of 350 ml/min was obtained via a RIJ catheter.      The treatment plan was discussed with Dr. Bart am during the treatment.    Total heparin received during the treatment: 0 units.  Line flushed, clamped and capped with heparin 1:1000 1.6 mL (1600 units) per lumen    Meds  given: EPO 10,000units IV   Complications: Arterial collapse, Blood lines reversed.      Person educated: pt. Knowledge base minimal. Barriers to learning: confusion. Educated on procedure via verbal mode. Patient needs followup.    ICEBOAT? Timeout performed pre-treatment  I: Patient was identified using 2 identifiers  C:  Consent Signed Yes  E: Equipment preventative maintenance is current and dialysis delivery system OK to use  B: Hepatitis B Surface Antigen: neg; Draw Date: 1/12/2022      Hepatitis B Surface  Antibody: unknown;  O: Dialysis orders present and complete prior to treatment  A: Vascular access verified and assessed prior to treatment  T: Treatment was performed at a clinically appropriate time  ?: Patient was allowed to ask questions and address concerns prior to treatment  See Adult Hemodialysis flowsheet in [facility] for further details and post assessment.  Machine water alarm in place and functioning. Transducer pods intact and checked every 15min.   Pt returned via bed.  Chlorine/Chloramine water system checked every 4 hours.    Patient repositioned every 2 hours during the treatment.  Post treatment report given to NIA Oquendo RN regarding 1L of fluid removed, last BP of 125/74, and patient pain rating of 0/10.

## 2023-02-01 NOTE — PROGRESS NOTES
RT note:    Pt has #7 Bivona trach that is capped.  Pt is on RA, not requiring any suctioning on this shift.  Wife at bedside did note that patient does cough occasionally with eating/drinking.  Speech/swallow eval was discussed with nursing.  Emergency supplies at bedside, trach cares completed.  RT to follow.    Erica Garcia, RT  4:46 PM 02/01/23

## 2023-02-01 NOTE — PROGRESS NOTES
PULMONARY NOTE    I'm aware of the request for a Pulmonary consult on this patient. I will be by later today or tomorrow morning to complete the consultation. Please call if concerns of an immediate nature arise.      Isak Mathew MD    [facility]  Office: [phone]  Pager: [phone]

## 2023-02-01 NOTE — PROGRESS NOTES
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 On midodrine 10mg TID   NO evidence of any renal recovery, is anuric/oliguric    2) Loculated pleural effusions     3) Anemia, 7.7 today. Adequate iron status end of December, will recheck..     Other:  Hx liver transplant 2016  Confusion/encephlopathy  DM     Plan/Recs:  1) HD today per chronic MWF schedule, attempt again goal UF 1kg  2) Epo with HD   3) checking ferritin, iron sats    DO [facility]  Office: [phone]  Cell: [phone]        Interval History:     Pt with sitter but no restraints.  Spouse at bedside. Is much more relaxed. States still he is not feeling well but not able to express why. No focal complaints.           Medications and Allergies:       - MEDICATION INSTRUCTIONS for Dialysis Patients -   Does not apply See Admin Instructions     apixaban ANTICOAGULANT  5 mg Oral BID     FLUoxetine  10 mg Oral Daily     folic acid  1 mg Oral or Feeding Tube Daily     insulin aspart  1-7 Units Subcutaneous TID AC     insulin aspart  1-5 Units Subcutaneous At Bedtime     lacosamide  100 mg Oral or Feeding Tube BID     lactulose  20 g Oral BID     levETIRAcetam  1,000 mg Oral or Feeding Tube Q24H     lidocaine  1 patch Transdermal Q24H     lidocaine   Transdermal Q8H BIJU     midodrine  10 mg Oral or Feeding Tube TID w/meals     mineral oil-hydrophilic petrolatum   Topical Daily     nystatin  500,000 Units Swish & Spit 4x Daily     pantoprazole  40 mg Oral or Feeding Tube BID AC     [Held by provider] QUEtiapine  25 mg Oral At Bedtime     rifaximin  550 mg Oral or Feeding Tube BID     sodium chloride (PF)  10-30 mL Intracatheter Q8H     sodium chloride (PF)  3 mL Intracatheter Q8H     tacrolimus  1 mg Oral  BID IS      No Known Allergies         Physical Exam:   Vitals were reviewed  /70   Pulse 100   Temp 98.4  F (36.9  C) (Axillary)   Resp 29   Wt 84.5 kg (186 lb 4.6 oz)   SpO2 94%   BMI 25.27 kg/m      Wt Readings from Last 3 Encounters:   01/31/23 84.5 kg (186 lb 4.6 oz)   01/21/23 82.4 kg (181 lb 11.2 oz)   12/28/22 96 kg (211 lb 10.3 oz)       Intake/Output Summary (Last 24 hours) at 2/1/2023 1423  Last data filed at 2/1/2023 0900  Gross per 24 hour   Intake 662 ml   Output 400 ml   Net 262 ml       GENERAL APPEARANCE: frail, weak appearing  HEENT:  bruise under right  eye  RESP: crackles at bases, right>left  CV: RRR, nl S1/S2  ABDOMEN: distended, soft. Rectal tube present  EXTREMITIES/SKIN: no c/c/rashes/lesions; 1+ dependent edema  NEURO:  conversaant  LINES:  Right tunneled dialysis catheter present, no visible abnormalities         Data:     BMP  Recent Labs   Lab 02/01/23  1231 02/01/23  0820 01/31/23  2123 01/31/23  1128 01/30/23  0758 01/30/23  0512 01/28/23  0733 01/28/23  0609   NA  --   --   --   --   --  139  --  137   POTASSIUM  --   --   --   --   --  4.3  --  4.5   CHLORIDE  --   --   --   --   --  96*  --  97*   KELLY  --   --   --   --   --  9.2  --  8.6   CO2  --   --   --   --   --  30*  --  32*   BUN  --   --   --   --   --  45.2*  --  23.6*   CR  --   --   --   --   --  3.99*  --  2.54*   * 132* 130* 103*   < > 111*   < > 93    < > = values in this interval not displayed.     CBC  Recent Labs   Lab 01/31/23  1527   WBC 11.5*   HGB 7.7*   HCT 26.7*   *        Lab Results   Component Value Date    AST 19 01/28/2023    ALT 5 (L) 01/28/2023    ALKPHOS 235 (H) 01/28/2023    BILITOTAL 0.6 01/28/2023    CHANDLER 44 01/31/2023     Lab Results   Component Value Date    INR 1.36 (H) 12/21/2022       Attestation:  I have reviewed today's vital signs, notes, medications, labs and imaging.    DO [facility] - Nephrology  Office: [phone]  Cell:  [phone]

## 2023-02-01 NOTE — PLAN OF CARE
Goal Outcome Evaluation:       A&O x's 2, confused.  VSS on R/A. Lungs sounds - clear. Bowel Sounds normoactive, has rectal tube. Urine Output - dysuria due to dialysis. Ambulation - none, bed to chair and back twice with ceiling lift. Diet - thick liquids and soft foots with enteral feeding supplemented. Pain controlled by - PRN oxy. Gave hydroxyzine for itching.Wounds - covered in mepilex on coccyx, left elbow and mid back, change all three during shift. Trach capped. Restless and itchy during most of the day. Gave hydroxyzine for it.  Wore mitts for half the day.

## 2023-02-01 NOTE — PLAN OF CARE
Goal Outcome Evaluation:    [id number]    COGNITION/MENTATION: A/O x 2, - situation, time. Illogical speech. Restless, attempted to remove mitts, pt continues to attempt to pull at lines/tubing, attempted taking mitts off 3x, unsucessful. Sitter at bedside for patients safety.  NEURO/CMS: Trace BLE ankle edema   TELE: n/a  ABNL. VITALS: Tachycardic, rate in mid 100's.   RESPIRATORY: On RA, Respiratory called to assess patient for wet secretions, unable to oral suction, RT deep suctioned trach. LS diminished.  GI: BS+, incontinent of loose green BM, rectal tube  : Oliguria, on hemodialysis  PAIN: No non-verbal signs of pain noted of pain except restlessness, PRN tylenol admin   SKIN: Scattered bruising - R & L Orbital, back. R&L heels cracked/peeling, Petechiae generalized. Coccyx PI w/mepilex in place.   DRAINS/LINES: PICC, PEG, CVC, Trach, Rectal tube  ACTIVITY: A of 2, lift. Able to shift weight in bed w/o assistance. Favors left side lying position.  DIET: Soft bite size, moderate thick, supplemented bolus TF after each meal if <50% of meal is consumed.       Hemodialysis M/W/F  Pt did not sleep well, up and restless despite receiving PRN atarax, tylenol and melatonin.

## 2023-02-01 NOTE — PROGRESS NOTES
[facility]    Medicine Progress Note - Hospitalist Service    Date of Admission:  1/21/2023    Assessment & Plan   Blanco Osborne is a 57 year old male admitted on 1/21/2023 as a transfer from [facility] for evaluation of loculated pleural effusion. Patient has multiple medical problems including history of liver transplant 2016 due to alcohol abuse, pAF on chronic anticoagulation, history of DM2, CVA, HTN, CHRISTIAN on BiPAP. 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 Discharge to [facility], back to [facility] and back to [facility] during month of December.     Recently while at [facility] there were concerns for worsening effusions. He had thoracentesis 01/13 which returned exudative without growth on cultures. CT chest/abdomen/pelvis on 01/18 demonstrated worsening effusions and concerns for infected parapneumonic effusions with possible SBP.  CT findings discussed with Pulmonology team with 3 different pulmonologist including (Dr. Monge, Dr. Chu and Dr. Jay) with agreement recommended transfer to [facility] for consideration of chest tube placement and possible intrapleural lysis.See discharge summary 01/21/2023 for more details of his recent hx.     Acute metabolic encephalopathy, recurrent  * several episodes of AMS. Generally it seems as though mentation waxes and wanes over a week or two period.  * AMS noted to be worsening since 1/28.  This coincides with lactulose being held for a day due to diarrhea, poor sleep, introduction of seroquel for sleep and discontinuation of hydroxyzine and trazodone.   - hold seroquel to due AMS noted with introduction  -Continue sitter  -  ammonia normal and patient having bowel movement, cbc with only mild leukocytosis,blood cultures negative to date     Bilateral pleural effusions, ?  empyema  Acute now chronic respiratory failure requiring tracheostomy  - resolved    Pneumonia  - resolved   ARDS  - resolved    Right pneumothorax - resolved   Initial event was parainfluenza and strep pneumo pneumonia back in November 2022. Treated for ARDS. Had failed extubation x2 and tracheostomy performed last hospitalization.  Had additional complications of bilateral pneumothoraces as well as hydropneumothorax- pleural fluid grew candida parapsilosis  -Has already completed 4-week antifungal treatment. Chest tube was placed and subsequently removed. While in LTACH he was successfully weaned from the ventilator. His respiratory status is now stable on room air.   * Most recently, there has been concern for right complicated parapneumonic effusion. S/p thoracentesis 11/26 and 1/13.  Right thoracentesis 1/13 consistent with exudative effusion with elevated LDH, high neutrophils, cultures show NGTD. CT on 1/18 showed small bilateral partially loculated pleural effusions with associated pleural enhancement consistent with infected effusions.   -Evaluated by thoracic surgery(Dr. Jackson)  - CT chest 1/22, small effusions on imaging and no plans for a chest tube.   - Continue mucomyst, albuterol and duonebs   - Tracheostomy care per RT  -Wife wanted to know how long patient has to have a tracheostomy tube.  Will reconsult thoracic surgery if we can start weaning his tracheostomy tube    Goals of care   As per report on 1/25 nursing had mentioned that patient had refused to undergo dialysis and want to stop care, palliative care was consulted.  Patient and his spouse denied wanting to stop care and wanted ongoing restorative care including full code     S/p Liver transplant 2016   Cirrhosis with ascites  Subacute bacterial peritonitis  Main manifestations of this are hepatic encephalopathy and ascites.  Hepatologist at [facility] felt that most likely reason for the cirrhosis was metabolic syndrome or alcohol  intake.  There was no evidence of rejection on biopsy and there was some evidence of regeneration. Paracentesis done on 12/22/2022 showed lactobacillus indicating SBP, treated with Unasyn and Augmentin, finished 2-week course 1/12/2023.  Requires large-volume paracentesis periodically for recurring ascites.    * Paracentesis 1/13/2023 yielded about 7500 cc. Cultures NGTD.  Most recent paracentesis on 1/24 with 4.8 L fluid removal  - Continue intermittent paracentesis as needed if develops symptomatic ascites.    - Further treatment for recurrent ascites with TIPS deferred to his Liver Transplant team and/or Hepatology, he has an appointment on 4/21/2023 with Hepatology, Dr. Simms  - Continue Tacrolimus 1 mg BID.  - Continue Lactulose and Rifaximin      Diarrhea  Liquid stool requiring rectal tube. Patient transferred with the rectal tube   - C. difficile negative, diarrhea present, patient on scheduled lactulose for his cirrhosis     Paroxysmal atrial fibrillation  Remains in sinus rhythm.  On anticoagulation with apixaban indefinitely for stroke prophylaxis.    - Cardiac monitoring       ESRD: Now on iHD.  No return of renal function.  - MWF schedule   - Nephrology following for continuation of dialysis     Acute anemia  Pt has required intermittent transfusions for on-going anemia.   -Anemia most likely secondary to critical illness/chronic disease, chronic renal disease  -Hemoglobin has remained stable around 7-8 for last several days, no signs of active bleeding  -Transfuse packed red blood cells to keep Hb> 7  -Monitor H&H.     History PEA arrest   PEA arrest prior to his previous admission and 1 episode of PEA associated with desaturation on 12/20.  No structural cardiac disease.   - Continue Cardiac Monitoring     Hypotension  Also has developed baseline hypotension 2/2 cirrhosis and prolonged critical illness.   -Continue current midodrine dose       Seizure after hypoxic event.  This is in the context of  baseline multifactorial encephalopathy secondary to his ongoing critical illness and prior cardiac arrests and prior strokes and cirrhosis with hepatic encephalopathy. MRI of head of 12/20/22 reveals no PRES or leptomeningeal enhancement but scattered.  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   - Continue with  Keppra and Vimpat       Diabetes mellitus type 2  -Hemoglobin A1c on November 2022 was 4.7  -PTA looks like was on 30 units of Lantus every morning, currently only on MDSSI  -Hypoglycemic protocol in place  -Blood sugar over the last 24 hours reasonable, less than 150 on most readings  -We will continue with only sliding scale for now, monitor and adjust medication as needed     Moderate malnutrition, protein and calorie type.  -Continue with tube feeds via PEG tube, patient refusing to eat or have TF restarted. He reports ongoing nausea and distention.  Calorie count has been initiated, discussed that with family and discussed that with patient in detail.  He would need at least bolus feeding with poor calorie counts, patient is refusing bolus feeding and tube feeding as such.  - Nutritionist consulted and following     Anxiety  - start fluoxetine 1/28, consider dose increase in 2-3 weeks pending response       Diet: Soft & Bite Sized Diet (level 6) Liquidized/Moderately Thick (level 3)  Room Service  Adult Formula Bolus Feeding: Novasource Renal; Route: Gastrostomy; 3 times daily; Volume per Bolus: 240; mL(s); 80 mL/hr x 3 hrs back up bolus feeding after each meal ONLY IF INTAKE <50% of meal  Snacks/Supplements Adult: Other; 4 oz Ensure with breakfast, Gelatein+ with lunch and magic cup with dinner (RD); With Meals    DVT Prophylaxis: DOAC  Nixon Catheter: Not present  Lines: PRESENT      PICC 11/24/22 Triple Lumen Right Brachial vein medial Access-Site Assessment: WDL  CVC Double Lumen Right External jugular Tunneled-Site Assessment: WDL      Cardiac  Monitoring: None  Code Status: Full Code      Clinically Significant Risk Factors              # Hypoalbuminemia: Lowest albumin = 1.9 g/dL at 1/23/2023  6:38 AM, will monitor as appropriate            # Severe Malnutrition: based on nutrition assessment        Disposition Plan      Expected Discharge Date: 02/06/2023,  3:00 PM    Destination: inpatient rehabilitation facility  Discharge Comments: 1/25 Palliative consult. [facility] won't take pt.  1/27 placement, no more TF, on dialysis and not able to sit up  1/31 referral sent to [facility]          Rylie Jenkins MD  Hospitalist Service  [facility]  Securely message with [employer] (more info)  Text page via [employer] Paging/Directory   ______________________________________________________________________    Interval History   Patient aware he is in the hospital.  He could recognize his wife who was at his bedside..  Reports feeling better today.  Denied any new complaints.    Physical Exam   Vital Signs: Temp: 97.9  F (36.6  C) Temp src: Axillary BP: 113/76 Pulse: 97   Resp: 18 SpO2: 97 % O2 Device: Trach dome    Weight: 186 lbs 4.62 oz    Exam:  Constitutional: Awake and alert, appears comfortable  Head: Normocephalic. No masses, lesions, tenderness or abnormalities  ENT: ENT exam normal, no neck nodes or sinus tenderness  Cardiovascular: RRR.  No murmurs, no rubs or JVD  Respiratory: Normal WOB,b/l equal air entry, no wheezes or crackles   Gastrointestinal: Abdomen soft, non-tender. BS normal. No masses, organomegaly  : Deferred  Extremities : No edema , no clubbing or cyanosis        Medical Decision Making       45 MINUTES SPENT BY ME on the date of service doing chart review, history, exam, documentation & further activities per the note.      Data     I have personally reviewed the following data over the past 24 hrs:    11.5 (H)  \   7.7 (L)   / 164     N/A N/A N/A /  124 (H)   N/A N/A N/A \       Imaging results reviewed over the  past 24 hrs:   No results found for this or any previous visit (from the past 24 hour(s)).  Recent Labs   Lab 02/01/23  1231 02/01/23  0820 01/31/23  2123 01/31/23  1527 01/30/23  0758 01/30/23  0512 01/28/23  0733 01/28/23  0609   WBC  --   --   --  11.5*  --   --   --   --    HGB  --   --   --  7.7*  --   --   --   --    MCV  --   --   --  103*  --   --   --   --    PLT  --   --   --  164  --   --   --   --    NA  --   --   --   --   --  139  --  137   POTASSIUM  --   --   --   --   --  4.3  --  4.5   CHLORIDE  --   --   --   --   --  96*  --  97*   CO2  --   --   --   --   --  30*  --  32*   BUN  --   --   --   --   --  45.2*  --  23.6*   CR  --   --   --   --   --  3.99*  --  2.54*   ANIONGAP  --   --   --   --   --  13  --  8   KELLY  --   --   --   --   --  9.2  --  8.6   * 132* 130*  --    < > 111*   < > 93   ALBUMIN  --   --   --   --   --   --   --  2.0*   PROTTOTAL  --   --   --   --   --   --   --  6.4   BILITOTAL  --   --   --   --   --   --   --  0.6   ALKPHOS  --   --   --   --   --   --   --  235*   ALT  --   --   --   --   --   --   --  5*   AST  --   --   --   --   --   --   --  19    < > = values in this interval not displayed.

## 2023-02-01 NOTE — PROGRESS NOTES
Thoracic Surgery    Patient at dialysis this afternoon. Will follow up tomorrow for tracheostomy decannulation.    Gunjan Gonzales PA-C with Dr. Nilesh HERNANDE[facility] Thor[facility]  Office: [phone]  Cell: [phone]

## 2023-02-01 NOTE — PROVIDER NOTIFICATION
MD Notification    Notified Person: MD    Notified Person Name:Rylie Jenkins    Notification Date/Time: 1/31/23 - 1520    Notification Interaction: jaqueline    Purpose of Notification:    Orders Received:      Read - 3:20 pm  Getting the labs done and want to clarify if you want the blood cultures done both peripherally or one peripheral and one from the PICC?      PK  Rylie Jenkins - 3:22 pm  One can be done from picc      NO  Read - 3:29 pm  ok, thanks. And can you tell me why the blood cultures were ordered? Pt's wife would like to know.      PK  Rylie Jenkins - 3:29 pm  It was ordered for his ongoing confusion to r/o infection as the etiology.      PK  Rylie Jenkins - 3:30 pm  Reversible etiology      NO  Read - 3:38 pm  thank you      NO  Read - 5:09 pm  We allowed the mittens to come off while the pt's family is here and he is itching a lot. Can we get him some hydroxyzine again?      NO  Read - 5:47 pm  Thank you!  Quick Messages  Priority  Responses          Comments:

## 2023-02-02 ENCOUNTER — APPOINTMENT (OUTPATIENT)
Dept: GENERAL RADIOLOGY | Facility: CLINIC | Age: 58
DRG: 981 | End: 2023-02-02
Attending: INTERNAL MEDICINE
Payer: COMMERCIAL

## 2023-02-02 ENCOUNTER — APPOINTMENT (OUTPATIENT)
Dept: SPEECH THERAPY | Facility: CLINIC | Age: 58
DRG: 981 | End: 2023-02-02
Attending: INTERNAL MEDICINE
Payer: COMMERCIAL

## 2023-02-02 ENCOUNTER — APPOINTMENT (OUTPATIENT)
Dept: PHYSICAL THERAPY | Facility: CLINIC | Age: 58
DRG: 981 | End: 2023-02-02
Attending: STUDENT IN AN ORGANIZED HEALTH CARE EDUCATION/TRAINING PROGRAM
Payer: COMMERCIAL

## 2023-02-02 LAB
GLUCOSE BLDC GLUCOMTR-MCNC: 101 MG/DL (ref 70–99)
GLUCOSE BLDC GLUCOMTR-MCNC: 104 MG/DL (ref 70–99)
GLUCOSE BLDC GLUCOMTR-MCNC: 109 MG/DL (ref 70–99)
GLUCOSE BLDC GLUCOMTR-MCNC: 115 MG/DL (ref 70–99)
GLUCOSE BLDC GLUCOMTR-MCNC: 132 MG/DL (ref 70–99)
HBV SURFACE AB SERPL IA-ACNC: 0.54 M[IU]/ML
HBV SURFACE AB SERPL IA-ACNC: NONREACTIVE M[IU]/ML
HBV SURFACE AG SERPL QL IA: NONREACTIVE
TACROLIMUS BLD-MCNC: 4.5 UG/L (ref 5–15)
TME LAST DOSE: ABNORMAL H
TME LAST DOSE: ABNORMAL H

## 2023-02-02 PROCEDURE — 92526 ORAL FUNCTION THERAPY: CPT | Mod: GN | Performed by: SPEECH-LANGUAGE PATHOLOGIST

## 2023-02-02 PROCEDURE — 99233 SBSQ HOSP IP/OBS HIGH 50: CPT | Performed by: INTERNAL MEDICINE

## 2023-02-02 PROCEDURE — 71045 X-RAY EXAM CHEST 1 VIEW: CPT

## 2023-02-02 PROCEDURE — 999N000127 HC STATISTIC PERIPHERAL IV START W US GUIDANCE

## 2023-02-02 PROCEDURE — 999N000157 HC STATISTIC RCP TIME EA 10 MIN

## 2023-02-02 PROCEDURE — 250N000013 HC RX MED GY IP 250 OP 250 PS 637: Performed by: INTERNAL MEDICINE

## 2023-02-02 PROCEDURE — 97530 THERAPEUTIC ACTIVITIES: CPT | Mod: GP

## 2023-02-02 PROCEDURE — 97110 THERAPEUTIC EXERCISES: CPT | Mod: GP

## 2023-02-02 PROCEDURE — 250N000013 HC RX MED GY IP 250 OP 250 PS 637: Performed by: STUDENT IN AN ORGANIZED HEALTH CARE EDUCATION/TRAINING PROGRAM

## 2023-02-02 PROCEDURE — 92610 EVALUATE SWALLOWING FUNCTION: CPT | Mod: GN | Performed by: SPEECH-LANGUAGE PATHOLOGIST

## 2023-02-02 PROCEDURE — 99222 1ST HOSP IP/OBS MODERATE 55: CPT | Performed by: PSYCHIATRY & NEUROLOGY

## 2023-02-02 PROCEDURE — 250N000012 HC RX MED GY IP 250 OP 636 PS 637: Performed by: STUDENT IN AN ORGANIZED HEALTH CARE EDUCATION/TRAINING PROGRAM

## 2023-02-02 PROCEDURE — 86706 HEP B SURFACE ANTIBODY: CPT | Performed by: INTERNAL MEDICINE

## 2023-02-02 PROCEDURE — 80197 ASSAY OF TACROLIMUS: CPT | Performed by: STUDENT IN AN ORGANIZED HEALTH CARE EDUCATION/TRAINING PROGRAM

## 2023-02-02 PROCEDURE — 87340 HEPATITIS B SURFACE AG IA: CPT | Performed by: INTERNAL MEDICINE

## 2023-02-02 PROCEDURE — G0463 HOSPITAL OUTPT CLINIC VISIT: HCPCS

## 2023-02-02 PROCEDURE — 99232 SBSQ HOSP IP/OBS MODERATE 35: CPT | Performed by: INTERNAL MEDICINE

## 2023-02-02 PROCEDURE — 86704 HEP B CORE ANTIBODY TOTAL: CPT | Performed by: INTERNAL MEDICINE

## 2023-02-02 PROCEDURE — 250N000009 HC RX 250: Performed by: STUDENT IN AN ORGANIZED HEALTH CARE EDUCATION/TRAINING PROGRAM

## 2023-02-02 PROCEDURE — 250N000013 HC RX MED GY IP 250 OP 250 PS 637

## 2023-02-02 PROCEDURE — 120N000001 HC R&B MED SURG/OB

## 2023-02-02 RX ADMIN — Medication 40 MG: at 16:16

## 2023-02-02 RX ADMIN — MIDODRINE HYDROCHLORIDE 10 MG: 5 TABLET ORAL at 08:49

## 2023-02-02 RX ADMIN — Medication 2.5 MG: at 23:47

## 2023-02-02 RX ADMIN — WHITE PETROLATUM: 1.75 OINTMENT TOPICAL at 09:19

## 2023-02-02 RX ADMIN — MIDODRINE HYDROCHLORIDE 10 MG: 5 TABLET ORAL at 13:10

## 2023-02-02 RX ADMIN — RIFAXIMIN 550 MG: 550 TABLET ORAL at 21:45

## 2023-02-02 RX ADMIN — LACOSAMIDE 100 MG: 10 SOLUTION ORAL at 09:45

## 2023-02-02 RX ADMIN — LACTULOSE 20 G: 10 SOLUTION ORAL at 21:45

## 2023-02-02 RX ADMIN — TACROLIMUS 1 MG: 5 CAPSULE ORAL at 09:10

## 2023-02-02 RX ADMIN — LACTULOSE 20 G: 10 SOLUTION ORAL at 09:00

## 2023-02-02 RX ADMIN — TACROLIMUS 1 MG: 5 CAPSULE ORAL at 18:47

## 2023-02-02 RX ADMIN — LEVETIRACETAM 1000 MG: 100 SOLUTION ORAL at 22:24

## 2023-02-02 RX ADMIN — RIFAXIMIN 550 MG: 550 TABLET ORAL at 08:50

## 2023-02-02 RX ADMIN — FOLIC ACID 1 MG: 1 TABLET ORAL at 08:50

## 2023-02-02 RX ADMIN — NYSTATIN 500000 UNITS: 100000 SUSPENSION ORAL at 09:03

## 2023-02-02 RX ADMIN — APIXABAN 5 MG: 5 TABLET, FILM COATED ORAL at 21:45

## 2023-02-02 RX ADMIN — ACETAMINOPHEN 650 MG: 325 TABLET, FILM COATED ORAL at 10:25

## 2023-02-02 RX ADMIN — HYDROXYZINE HYDROCHLORIDE 10 MG: 10 TABLET ORAL at 16:16

## 2023-02-02 RX ADMIN — APIXABAN 5 MG: 5 TABLET, FILM COATED ORAL at 08:50

## 2023-02-02 RX ADMIN — LIDOCAINE 1 PATCH: 560 PATCH PERCUTANEOUS; TOPICAL; TRANSDERMAL at 09:10

## 2023-02-02 RX ADMIN — Medication 40 MG: at 08:59

## 2023-02-02 RX ADMIN — ACETAMINOPHEN 650 MG: 325 TABLET, FILM COATED ORAL at 15:08

## 2023-02-02 RX ADMIN — NYSTATIN 500000 UNITS: 100000 SUSPENSION ORAL at 13:12

## 2023-02-02 RX ADMIN — MIDODRINE HYDROCHLORIDE 10 MG: 5 TABLET ORAL at 18:43

## 2023-02-02 RX ADMIN — LACOSAMIDE 100 MG: 10 SOLUTION ORAL at 21:46

## 2023-02-02 RX ADMIN — FLUOXETINE 10 MG: 10 CAPSULE ORAL at 08:50

## 2023-02-02 RX ADMIN — NYSTATIN 500000 UNITS: 100000 SUSPENSION ORAL at 22:24

## 2023-02-02 RX ADMIN — Medication 2.5 MG: at 10:56

## 2023-02-02 ASSESSMENT — ACTIVITIES OF DAILY LIVING (ADL)
ADLS_ACUITY_SCORE: 66
ADLS_ACUITY_SCORE: 62
ADLS_ACUITY_SCORE: 66

## 2023-02-02 NOTE — PLAN OF CARE
Goal Outcome Evaluation:      Plan of Care Reviewed With: patient    Overall Patient Progress: improvingOverall Patient Progress: improving    VSS. Trach capped. Alert to self and place this morning. Went to dialysis this afternoon- removed 1L of fluid. When patient returned he was highly suspicious of staff and would not allow staff to take vitals or administer medications. Patients brother at bedside and able to verbally calm patient enough to administer medications. Able to eat half of meals this AM, refusing tube feeds this evening. Refused blood glucose this evening. PRN atarax given with evening meds. Anuric. Speech therapy consulted for coughing with eating. Plan to have physical therapy tomorrow. Thoracic surgery to see patient tomorrow regarding trach and plan. CTM

## 2023-02-02 NOTE — PROGRESS NOTES
CLINICAL NUTRITION SERVICES - REASSESSMENT NOTE      Future/Additional Recommendations:   Recommend provider discussion with family on TF plan -- patient/family has been resistant to much discussion with RDs over admit   Continue to recommend supplemental nutrition via TF until oral intake improves - feel as though patient would do better with nocturnal vs bolus TF schedule    Malnutrition: (1/22)  % Weight Loss:  > 5% in 1 month (severe malnutrition)  % Intake:  </= 50% for >/= 5 days (severe malnutrition)- suspected   Subcutaneous Fat Loss:  Orbital region moderate depletion, Upper arm region moderate-severe depletion and Thoracic region moderate-severe depletion  Muscle Loss:  Temporal region moderate depletion, Clavicle bone region severe depletion, Acromion bone region severe depletion, Patellar region moderate depletion and Anterior thigh region moderate-severe depletion  Fluid Retention:  Trace     Malnutrition Diagnosis: Severe malnutrition  In Context of:  Acute on Chronic illness or disease       EVALUATION OF PROGRESS TOWARD GOALS   Diet:  Soft & Bite Sized Diet + Moderately Thick Liquids per SLP recommendations   Room Service w/ Assist     Supplements:   Breakfast - 4 oz Ensure  Lunch - Gelatein+  Dinner - Magic Cup    Nutrition Support - Enteral:    Type of Feeding Tube: G-tube  Enteral Frequency:  Continuous  Enteral Regimen: Novasource Renal at 80 mL/hr x 3 hours TID --> IF consumes < 50% of meal  Total Enteral Provisions: 240 mL formula TID or 720 mL/day = 1440 kcal, 66 g protein, 132 g CHO, 0 g fiber and 516 mL free water (meeting approx 58% estimated energy needs and 66% estimated protein needs)    Free Water Flush: 100 mL before and after each feeding    Intake/Tolerance:    TF -- Received 480 mL of TF on 1/30, 252 mL on 1/31 and 0 mL on 2/1 per I/Os and RN notes.   Oral -- Continues to eat 0-50% of meals over the past 3 days, mostly just taking bites but yesterday did eat 50% of breakfast and  lunch meals per RN.     Labs reviewed - (none new since 1/30)  Recent Labs   Lab 02/02/23  0746 02/02/23  0047 02/01/23  2149 02/01/23  1231 02/01/23  0820 01/31/23  2123   * 132* 165* 124* 132* 130*     Meds - folic acid 1 mg/day, medium sliding scale insulin, lactulose BID  Stool -  2/1: x 3  1/31: 400 mL  1/30: 450 mL    Weight - Fluctuations likely with fluid status 2/2 paracentesis and dialysis but do suspect some true weight loss as well given poor nutritional status.   Wt Readings from Last 10 Encounters:   01/31/23 84.5 kg (186 lb 4.6 oz)   01/21/23 82.4 kg (181 lb 11.2 oz)   12/28/22 96 kg (211 lb 10.3 oz)   12/16/22 100.2 kg (221 lb)   12/16/22 100.2 kg (221 lb)   12/15/22 87 kg (191 lb 12.8 oz)   10/07/19 137.5 kg (303 lb 3.2 oz)   10/04/19 131.5 kg (290 lb)   02/20/19 140.4 kg (309 lb 9.6 oz)   07/10/18 137.5 kg (303 lb 3.2 oz)         ASSESSED NUTRITION NEEDS:  Dosing Weight 82.4 kg   Estimated Energy Needs: 0875-1550 kcals (30-35 Kcal/Kg)  Justification: repletion and HD  Estimated Protein Needs: + grams protein (1.2-1.5+ g pro/Kg)  Justification: Repletion and dialysis  Estimated Fluid Needs: per provider pending fluid status        NEW FINDINGS:   Dialysis yesterday - 1L fluid removed   Patient pulled trach out overnight - doing well so far     SLP re-consulted d/t patient noted to be coughing with intake yesterday -- they continue to recommend current diet     Discharge pending placement     1/30:  WOCN =   - Superior coccyx and Left coccyx - PI stage 2: healed 1/30   - Midline low back - Pt has purpura all over body and appears skin was torn open   Status: Initial assessment    Previous Goals:   EN + PO intake to meet % nutrition needs if GOC remain restorative   Evaluation: Not met    Previous Nutrition Diagnosis:   Inadequate protein-energy intake related to decreased appetite with early satiety/bloating/nausea and refusal of TF as evidenced by PO + EN intake meeting < 50%  nutrition needs for the ~2 weeks   Evaluation: No change      CURRENT NUTRITION DIAGNOSIS  Inadequate protein-energy intake related to decreased appetite with early satiety/bloating/nausea and refusal of TF as evidenced by PO + EN intake meeting < 50% nutrition needs for the ~2 weeks     INTERVENTIONS  Recommendations / Nutrition Prescription  Recommend provider discussion with family on TF plan -- patient/family has been resistant to much discussion with RDs over admit   Continue to recommend supplemental nutrition via TF until oral intake improves - feel as though patient would do better with nocturnal vs bolus TF schedule     Implementation  No new interventions at this time     Goals  EN + PO intake to meet % nutrition needs       MONITORING AND EVALUATION:  Progress towards goals will be monitored and evaluated per protocol and Practice Guidelines      Sirena Gomez RD, LD

## 2023-02-02 NOTE — PROGRESS NOTES
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 On midodrine 10mg TID  No evidence of any renal recovery, is anuric/oliguric     2) Loculated pleural effusions     3) Anemia, 7.7 1/31. 1/30 labs with ferritin 502, 30% iron sats reflecting adequate iron stores. 40,000 units epo PTA, 13,000 q HD here reflecting epo resistance.        Other:  Hx liver transplant 2016  Confusion/encephlopathy  DM     Plan/Recs:  1) HD tomorrow per chronic MWF schedule, goal UF 1kg  2) Epo 13,000 units with HD       Torsten Montaño DO  [facility] consultants  Office: [phone]  Cell: [phone]        Interval History:      Spouse at bedside, reports patient is still aggitated. S/p dialysis  Yesterday, ran 3.5 hrs on K3 bath with 1kg UF done. Continued delirium, confusion.          Medications and Allergies:       - MEDICATION INSTRUCTIONS for Dialysis Patients -   Does not apply See Admin Instructions     apixaban ANTICOAGULANT  5 mg Oral BID     FLUoxetine  10 mg Oral Daily     folic acid  1 mg Oral or Feeding Tube Daily     insulin aspart  1-7 Units Subcutaneous TID AC     insulin aspart  1-5 Units Subcutaneous At Bedtime     lacosamide  100 mg Oral or Feeding Tube BID     lactulose  20 g Oral BID     levETIRAcetam  1,000 mg Oral or Feeding Tube Q24H     lidocaine  1 patch Transdermal Q24H     lidocaine   Transdermal Q8H BIJU     midodrine  10 mg Oral or Feeding Tube TID w/meals     mineral oil-hydrophilic petrolatum   Topical Daily     nystatin  500,000 Units Swish & Spit 4x Daily     pantoprazole  40 mg Oral or Feeding Tube BID AC     [Held by provider] QUEtiapine  25 mg Oral At Bedtime     rifaximin  550 mg Oral or Feeding Tube BID     sodium chloride (PF)  10-30 mL Intracatheter Q8H     sodium chloride  (PF)  3 mL Intracatheter Q8H     tacrolimus  1 mg Oral BID IS      No Known Allergies         Physical Exam:   Vitals were reviewed  /75 (BP Location: Right arm)   Pulse 101   Temp 97.9  F (36.6  C) (Oral)   Resp 15   Wt 84.5 kg (186 lb 4.6 oz)   SpO2 98%   BMI 25.27 kg/m      Wt Readings from Last 3 Encounters:   01/31/23 84.5 kg (186 lb 4.6 oz)   01/21/23 82.4 kg (181 lb 11.2 oz)   12/28/22 96 kg (211 lb 10.3 oz)       Intake/Output Summary (Last 24 hours) at 2/2/2023 1416  Last data filed at 2/2/2023 0800  Gross per 24 hour   Intake 640 ml   Output 1000 ml   Net -360 ml       GENERAL APPEARANCE: frail, weak appearing, confused  HEENT:  unremarkable  CV: RRR, nl S1/S2  EXTREMITIES/SKIN: no c/c/rashes/lesions; 1+ dependent edema  LINES:  Right tunneled dialysis catheter present, no visible abnormalities           Data:     BMP  Recent Labs   Lab 02/02/23  1314 02/02/23  0746 02/02/23  0047 02/01/23  2149 01/30/23  0758 01/30/23  0512 01/28/23  0733 01/28/23  0609   NA  --   --   --   --   --  139  --  137   POTASSIUM  --   --   --   --   --  4.3  --  4.5   CHLORIDE  --   --   --   --   --  96*  --  97*   KELLY  --   --   --   --   --  9.2  --  8.6   CO2  --   --   --   --   --  30*  --  32*   BUN  --   --   --   --   --  45.2*  --  23.6*   CR  --   --   --   --   --  3.99*  --  2.54*   * 101* 132* 165*   < > 111*   < > 93    < > = values in this interval not displayed.     CBC  Recent Labs   Lab 01/31/23  1527   WBC 11.5*   HGB 7.7*   HCT 26.7*   *        Lab Results   Component Value Date    AST 19 01/28/2023    ALT 5 (L) 01/28/2023    ALKPHOS 235 (H) 01/28/2023    BILITOTAL 0.6 01/28/2023    CHANDLER 44 01/31/2023     Lab Results   Component Value Date    INR 1.36 (H) 12/21/2022       Attestation:  I have reviewed today's vital signs, notes, medications, labs and imaging.    DO [facility] - Nephrology  Office: [phone]  Cell: [phone]

## 2023-02-02 NOTE — CONSULTS
Initial Psychiatric Consult   Consult date: February 2, 2023         Reason for Consult, requesting source:    Delirium    Requesting source: Michael Chou    Labs and imaging reviewed. Patient seen and evaluated by Ofe Goetz MD          HPI:   This is a 57-year-old male whom we are asked to see to assist with delirium management.  Patient has been residing in an LTACH since he suffered {EA  arrest in November and was diagnosed with parainfluenza and strep pneumoniae as well as acute on chronic renal insufficiency.  He ended up having end-stage renal disease, and was also found to have cirrhosis of his transplanted liver which had been performed in 2016.  The patient presented for this hospitalization on 1/21/2023 as a transfer from [place] for evaluation of loculated pleural effusion.  At the LTAC, the patient had had worsening effusions, and had thoracentesis on 1/13/2023 which returned exudative without growth on cultures.  CT chest abdomen pelvis on 1/18/2023 showed worsening effusions and concerns for infected parapneumonic effusions with possible SBP.  The patient was transferred to [facility] for consideration of chest tube placement and possible intrapleural lysis.  The patient has been encephalopathic, and pulled out his tracheostomy tube last night in addition to his PICC line.    I did see the patient today along with his wife.  He was able to answer some questions, including confirming that he is having visual hallucinations.  He does not remember pulling out his trach last night.  At one point he fumbled with his PICC again, but responded to redirection from his wife.  I did talk to both his wife and his brother on the phone, both of them are quite involved.  Both confirm that the patient has been restless over the last several days, possibly coinciding with initiation of Prozac on 1/28/2023.  Patient has also recently been restarted on Vistaril low-dose for itching.  He was very  pruritic while I was talking to his wife.  She brushed him on the skin with a hairbrush where he indicated he was itching.  I discussed with both the patient's wife and his brother that antihistamines can cause people to hallucinate and experience agitation.  I recommend discontinuation of both the Vistaril and the Prozac.  I also told him I would come back by tomorrow and see how he is doing.        Past Psychiatric History:   We did not discuss today due to the acuity of the situation, and care provider stress.        Substance Use and History:   Charting indicates patient has a history of alcohol use disorder, and may have developed liver issues related to alcohol use prior to transplant.        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     NAFLD (nonalcoholic fatty liver disease) 2/11/2016     CHRISTIAN on CPAP      Parainfluenza type 1 infection 11/19/2022     SBP (spontaneous bacterial peritonitis) (H)     [facility]     Sepsis due to Streptococcus pneumoniae with acute hypoxic respiratory failure (H) 11/19/2022     Tubular adenoma 10/2019    Large cecal adenoma- due for surveillance colonoscopy in 3 years (10/2022)       PAST SURGICAL HISTORY:   Past Surgical History:   Procedure Laterality Date     APPENDECTOMY       BENCH LIVER N/A 9/20/2016    Procedure: BENCH LIVER;  Surgeon: Enoc Crews MD;  Location: [facility] OR     BRONCHOSCOPY FLEXIBLE AND RIGID N/A 12/20/2022    Procedure: BRONCHOSCOPY;  Surgeon: Alena Valenzuela MD;  Location:  GI     COLONOSCOPY  8/6/13    repeat in 2018     COLONOSCOPY N/A 10/4/2019    Procedure: COLONOSCOPY, WITH POLYPECTOMY AND BIOPSY;  Surgeon: Go Chong MD;  Location:  [facility] OR     HERNIA REPAIR       IR CHEST TUBE PLACEMENT NON-TUNNELED RIGHT  2022     IR CVC TUNNEL PLACEMENT > 5 YRS OF AGE  2022     IR GASTROSTOMY TUBE PERCUTANEOUS PLCMNT  2022     IR PARACENTESIS  2022     IR PARACENTESIS  2022     IR THORACENTESIS  2022     IR TRANSCATHETER BIOPSY  2022     TRACHEOSTOMY N/A 2022    Procedure: TRACHEOSTOMY;  Surgeon: Nilesh Jackson MD;  Location:  OR     TRANSPLANT LIVER RECIPIENT  DONOR N/A 2016    Procedure: TRANSPLANT LIVER RECIPIENT  DONOR;  Surgeon: Enoc Crews MD;  Location: [facility] OR             Family History:   FAMILY HISTORY: No family history on file.    Family Psychiatric History:         Social History:   SOCIAL HISTORY:   Social History     Tobacco Use     Smoking status: Never     Smokeless tobacco: Never   Substance Use Topics     Alcohol use: Not Currently     Alcohol/week: 0.0 standard drinks                Physical ROS:   Patient could not comply with comprehensive review of systems today except to say that he is hallucinating.         Medications:       - MEDICATION INSTRUCTIONS for Dialysis Patients -   Does not apply See Admin Instructions     apixaban ANTICOAGULANT  5 mg Oral BID     FLUoxetine  10 mg Oral Daily     folic acid  1 mg Oral or Feeding Tube Daily     insulin aspart  1-7 Units Subcutaneous TID AC     insulin aspart  1-5 Units Subcutaneous At Bedtime     lacosamide  100 mg Oral or Feeding Tube BID     lactulose  20 g Oral BID     levETIRAcetam  1,000 mg Oral or Feeding Tube Q24H     lidocaine  1 patch Transdermal Q24H     lidocaine   Transdermal Q8H BIJU     midodrine  10 mg Oral or Feeding Tube TID w/meals     mineral oil-hydrophilic petrolatum   Topical Daily     nystatin  500,000 Units Swish & Spit 4x Daily     pantoprazole  40 mg Oral or Feeding Tube BID AC     [Held by provider] QUEtiapine  25 mg Oral At Bedtime     rifaximin  550 mg Oral or Feeding Tube BID      sodium chloride (PF)  10-30 mL Intracatheter Q8H     sodium chloride (PF)  3 mL Intracatheter Q8H     tacrolimus  1 mg Oral BID IS              Allergies:   No Known Allergies       Labs:     Recent Results (from the past 48 hour(s))   Blood Culture Arm, Right    Collection Time: 01/31/23  9:01 PM    Specimen: Arm, Right; Blood   Result Value Ref Range    Culture No growth after 1 day    Glucose by meter    Collection Time: 01/31/23  9:23 PM   Result Value Ref Range    GLUCOSE BY METER POCT 130 (H) 70 - 99 mg/dL   Glucose by meter    Collection Time: 02/01/23  8:20 AM   Result Value Ref Range    GLUCOSE BY METER POCT 132 (H) 70 - 99 mg/dL   Glucose by meter    Collection Time: 02/01/23 12:31 PM   Result Value Ref Range    GLUCOSE BY METER POCT 124 (H) 70 - 99 mg/dL   Glucose by meter    Collection Time: 02/01/23  9:49 PM   Result Value Ref Range    GLUCOSE BY METER POCT 165 (H) 70 - 99 mg/dL   Glucose by meter    Collection Time: 02/02/23 12:47 AM   Result Value Ref Range    GLUCOSE BY METER POCT 132 (H) 70 - 99 mg/dL   Tacrolimus by Tandem Mass Spectrometry    Collection Time: 02/02/23  6:18 AM   Result Value Ref Range    Tacrolimus by Tandem Mass Spectrometry 4.5 (L) 5.0 - 15.0 ug/L    Tacrolimus Last Dose Date      Tacrolimus Last Dose Time     Hepatitis B surface antigen    Collection Time: 02/02/23  6:18 AM   Result Value Ref Range    Hepatitis B Surface Antigen Nonreactive Nonreactive   Hepatitis B Surface Antibody    Collection Time: 02/02/23  6:18 AM   Result Value Ref Range    Hepatitis B Surface Antibody Instrument Value 0.54 <8.00 m[IU]/mL    Hepatitis B Surface Antibody Nonreactive    Glucose by meter    Collection Time: 02/02/23  7:46 AM   Result Value Ref Range    GLUCOSE BY METER POCT 101 (H) 70 - 99 mg/dL   Glucose by meter    Collection Time: 02/02/23  1:14 PM   Result Value Ref Range    GLUCOSE BY METER POCT 109 (H) 70 - 99 mg/dL          Physical and Psychiatric Examination:     /75 (BP  Location: Right arm)   Pulse 101   Temp 97.9  F (36.6  C) (Oral)   Resp 15   Wt 84.5 kg (186 lb 4.6 oz)   SpO2 96%   BMI 25.27 kg/m    Weight is 186 lbs 4.62 oz  Body mass index is 25.27 kg/m .    Physical Exam:  I have reviewed the physical exam as documented by by the medical team and agree with findings and assessment and have no additional findings to add at this time.    Mental Status Exam:    Appearance: This is a 57-year-old male who is cachectic and has a protuberant abdomen.  He is restless in the bed, and looks uncomfortable in general.  He appears much older than his stated age.  Attitude:  cooperative  Eye Contact:  poor   Mood:  anxious  Affect:  mood congruent  Speech:  Patient has a tracheostomy, and mouthed words  Language: Fluent in english   Psychomotor Behavior:  fidgeting  Thought Process:  goal oriented and [place]  Associations:  Unable to assess  Thought Content:  no auditory hallucinations present and visual hallucinations present  Insight:  Difficult to assess due to agitation and restlessness as well as trach  Judgement:  limited  Oriented to:  Unable to assess  Attention Span and Concentration:  limited  Recent and Remote Memory:  Unable to assess  Fund of Knowledge: Appropriate   Gait and Station: Patient bedridden               DSM-5 Diagnosis:     Delirium due to multiple etiologies, acute, hyperactive    Status post liver transplant    Cirrhosis with ascites    Subacute bacterial peritonitis    End-stage renal disease            Assessment:   This is a 57-year-old male with a very complex medical picture.  He has a cirrhotic transplanted liver, end-stage renal disease, and multiple other medical issues that seem to have occurred after respiratory and subsequent PEA arrest.  He has had a seizure, and struggled with various infections.    The patient has been encephalopathic the last several days associated with restlessness.  He had Prozac initiated for 5 days ago, and then  low-dose Vistaril a couple of days ago.  Prozac is a medication with the potential for multiple medication interactions, and can cause activation and agitation in a person who has no medical issues or the stressors that this patient is dealing with.  I talked to his brother, who had recommended that the patient go on Prozac because it was helpful for his anxiety.  I do recommend that this medication be discontinued.  It has a lengthy half-life of up to 9 days for the parent drug and the active metabolite, so we will continue to linger over the next couple of weeks.    Also recommend discontinuation of Vistaril.  The family indicated that the restlessness has occurred over the last couple of days.  There could be many different potential etiologies of the patient's restlessness and hallucinations, but will discontinue the Vistaril in the event that this may be contributing, or causative.        Summary of Recommendations:   1.  Discontinue Prozac and Vistaril.    2.  We will follow-up tomorrow.      Ofe Goetz MD  Consult/Liaison Psychiatry   [facility]

## 2023-02-02 NOTE — PROGRESS NOTES
RT Note:    No issues since trach decannulation. Pt placed on 2L NC w/ bubbler for supplemental humidity. SpO2 95-97%. Stoma is currently covered with gauze. Recommend Wound-Care Consult for stoma cares.  RT to follow.    Guido Mclain, RT  5:47 AM  02/02/23

## 2023-02-02 NOTE — PROGRESS NOTES
"CLINICAL SWALLOW EVALUATION     02/02/23 1052   Appointment Info   Signing Clinician's Name / Credentials (SLP) Gunjan Castano [facility]   General Information   Onset of Illness/Injury or Date of Surgery 12/16/22  (Admit to [facility] 1/21/2023)   Referring Physician Rylie Jenkins MD   Patient/Family Therapy Goal Statement (SLP) Patient wants to get back to bed.   Pertinent History of Current Problem Per provider note: \"Blanco Osborne is a 57 year old male admitted on 1/21/2023 as a transfer from [facility] for evaluation of loculated pleural effusion. Patient has multiple medical problems including history of liver transplant 2016 due to alcohol abuse, pAF on chronic anticoagulation, history of DM2, CVA, HTN, CHRISTIAN on BiPAP. 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 Discharge to [facility], back to [facility] and back to [facility] during month of December. Recently while at [facility] there were concerns for worsening effusions. He had thoracentesis 01/13 which returned exudative without growth on cultures. CT chest/abdomen/pelvis on 01/18 demonstrated worsening effusions and concerns for infected parapneumonic effusions with possible SBP.  CT findings discussed with Pulmonology team with 3 different pulmonologist including (Dr. Monge, Dr. Chu and Dr. Jay) with agreement recommended transfer to [facility] for consideration of chest tube placement and possible intrapleural lysis.See discharge summary 01/21/2023 for more details of his recent hx.\" Patient s/p tracheostomy (initially placed 11/29/2022); self-decannulated 2/2/2023 and now on 2L O2 via NC.   General Observations Patient up in chair upon SLP arrival with spouse present. Patient has 1:1 sitter for safety. Sitter reported patient ate scrambled eggs well this AM. RN reported no swallowing concerns " "today. Per chart review, RN note indicated pateint's spouse reported some coughing with PO intake. Note soft voice (improved with gentle pressure to bandage covering healing stoma). Patient's spouse asked about vocal cord assessment today. She also reported patient has been fearful of swallowing since choking episode a few weeks ago.   Type of Evaluation   Type of Evaluation Swallow Evaluation   Oral Motor   Oral Musculature generally intact   Structural Abnormalities none present   Mucosal Quality adequate   Dentition (Oral Motor)   Dentition (Oral Motor) natural dentition   Facial Symmetry (Oral Motor)   Facial Symmetry (Oral Motor) WNL   Lip Function (Oral Motor)   Lip Range of Motion (Oral Motor) WNL   Tongue Function (Oral Motor)   Tongue ROM (Oral Motor) WNL   Jaw Function (Oral Motor)   Jaw Function (Oral Motor) WNL   Vocal Quality/Secretion Management (Oral Motor)   Vocal Quality (Oral Motor) breathy;hypophonic;hoarse   Secretion Management (Oral Motor) WNL   General Swallowing Observations   Current Diet/Method of Nutritional Intake (General Swallowing Observations, NIS) soft & bite-sized (level 6);moderately thick liquids/liquidized (level 3);gastrostomy tube (PEG)   Respiratory Support (General Swallowing Observations) nasal cannula  (2L)   Past History of Dysphagia Multiple swallow evaluations completed since onset. Most recently, video swallow study 1/13/2023: \"Patient presents with moderate oropharyngeal dysphagia characterized by mildly decreased bolus prep and AP bolus transit, delayed swallow reflex and decreased pharyngeal constriction.  As result, demonstrated aspiration and penetration inconsistently with mildly thick and thin liquids.  Inconsistent cough reflex in response to aspiration. Appeared to be dependent on volume of bolus aspirated. Start diet of soft and bite sized and moderately thick liquids. Continues to be at risk for aspiration warranting ongoing analysis and intervention.\" Per " "most recent SLP session 1/20/2023: \"Recommend continue soft/bite size diet with moderately thick liquids (IDDSI 6, 3) given swallow strategies (fully upright position at 90 degrees, small bites/sips, slow pace). Patient needs assist for set up and at meals due to weakness/fatigue andconfusion. Demonstrates good potential for implementation of pharyngeal exercises. Adequate participation level despite lethargy, but needed encouragement. SLP to follow for dysphagia.\"   Comment, General Swallowing Observations Patient with limited intake for exam today.   Clinical Swallow Evaluation   Feeding Assistance dependent  (due to weakness/fatigue)   Clinical Swallow Evaluation Textures Trialed moderately thick liquids/liquidized;pureed;soft & bite-sized   Clinical Swallow Eval: Moderately Thick Liquids   Mode of Presentation spoon;fed by clinician   Volume Presented 2 sips   Oral Phase WFL   Pharyngeal Phase intact   Diagnostic Statement No overt aspiration signs.   Clinical Swallow Evaluation: Puree Solid Texture Trial   Mode of Presentation, Puree spoon;fed by clinician   Volume of Puree Presented 2 bites pudding   Oral Phase, Puree WFL   Pharyngeal Phase, Puree intact   Diagnostic Statement No overt aspiration signs.   Clinical Swallow Eval: Soft & Bite Sized   Mode of Presentation spoon;fed by clinician   Volume Presented 2 bites   Oral Phase WFL   Pharyngeal Phase intact   Diagnostic Statement No overt aspiration signs.   Esophageal Phase of Swallow   Patient reports or presents with symptoms of esophageal dysphagia No   Swallowing Recommendations   Diet Consistency Recommendations soft & bite-sized (level 6);moderately thick liquids/liquidized (level 3)   Supervision Level for Intake close supervision needed   Mode of Delivery Recommendations bolus size, small;slow rate of intake   Monitoring/Assistance Required (Eating/Swallowing) stop eating activities when fatigue is present;monitor for cough or change in vocal " quality with intake   Recommended Feeding/Eating Techniques (Swallow Eval) maintain upright sitting position for eating;maintain upright posture during/after eating for 30 minutes;minimize distractions during oral intake;provide 6 smaller meals throughout day;provide assist with feeding;set-up and prepare tray   General Therapy Interventions   Planned Therapy Interventions Dysphagia Treatment   Dysphagia treatment Oropharyngeal exercise training;Modified diet education;Instruction of safe swallow strategies   Clinical Impression   Criteria for Skilled Therapeutic Interventions Met (SLP Eval) Yes, treatment indicated   SLP Diagnosis Dysphagia   Risks & Benefits of therapy have been explained evaluation/treatment results reviewed;care plan/treatment goals reviewed;risks/benefits reviewed;current/potential barriers reviewed;participants voiced agreement with care plan;participants included;patient;spouse/significant other   Clinical Impression Comments Clinical swallow evaluation completed per MD order. Moderate oropharyngeal dysphagia based on clinical findings today in conjunction with recent video swallow study 1/13/2023. Patient has hx of silent aspiration of thin and mildly thick liquids. No aspiration occurred with moderately thick liquids or soft/bite sized textures. Patient must follow swallow strategies consistently for safety and will need close supervision and assist due to level of fatigue and confusion. Recommend continue soft/bite size diet with moderately thick liquids (IDDSI 6, 3) given swallow strategies (fully upright position at 90 degrees, small bites/sips, slow pace). Patient needs assist for set up and at meals due to weakness/fatigue and confusion. Anticipate repeat video swallow study now that patient decannulated. Patient may benefit from ENT consult due to persistent dysphonia. SLP to follow for dysphagia.   SLP Total Evaluation Time   Eval: oral/pharyngeal swallow function, clinical swallow  Minutes (61126) 10   SLP Goals   Therapy Frequency (SLP Eval) 5 times/wk   SLP Predicted Duration/Target Date for Goal Attainment 03/02/23   SLP Goals Swallow       Interventions   Interventions Quick Adds Swallowing Dysfunction   Swallowing Dysfunction &/or Oral Function for Feeding   Treatment of Swallowing Dysfunction &/or Oral Function for Feeding Minutes (34376) 8   Symptoms Noted During/After Treatment Fatigue   Treatment Detail/Skilled Intervention Provided education about diet modifications and swallow strategies and anticipate repeat VFSS once stoma heals.   SLP Discharge Planning   SLP Plan diet f/u; swallow strategy training; repeat VFSS once trach stoma heals   SLP Discharge Recommendation Transitional Care Facility   SLP Rationale for DC Rec Swallow and cognitive function below baseline.   SLP Brief overview of current status  Recommend continue soft/bite size diet with moderately thick liquids (IDDSI 6, 3) given swallow strategies (fully upright position at 90 degrees, small bites/sips, slow pace). Patient needs assist for set up and at meals due to weakness/fatigue and confusion. Anticipate repeat video swallow study now that patient decannulated. Patient may benefit from ENT consult due to persistent dysphonia. SLP to follow for dysphagia.

## 2023-02-02 NOTE — PLAN OF CARE
Goal Outcome Evaluation:           Overall Patient Progress: improvingOverall Patient Progress: improving    Outcome Evaluation: Ongoing variable intake, consuming up to 50% of meals at best but not often. Intermittently accepting TF. Recommend provider discussion with patient/family on GOC regarding TF.    Sirena Gomez RD, LD

## 2023-02-02 NOTE — PROGRESS NOTES
Thoracic Surgery:    Chart check-- were asked yesterday to consider patient for trach decannulation. Patient was away from room in dialysis yesterday afternoon when we stopped by to assess, so plan was for likely decannulation today. Per chart notes, patient pulled out his trach last night and is tolerating well without increased SOB, persistent cough, worsened hypoxia. Is satting 98-99% on 2 L.  Agree with gauze dressing and consideration of WOC RN if desired for stoma care.     Paola Hoang PA-C with Dr. Nilesh Jackson  [facility]  Cell [phone]

## 2023-02-02 NOTE — PROGRESS NOTES
SLP Gunjan consulted for an ENT for this patient. ENT called back this writer and said this can be done as follow- up after discharge. Made Gunjan the SLP aware.

## 2023-02-02 NOTE — PROGRESS NOTES
[facility]    Medicine Progress Note - Hospitalist Service    Date of Admission:  1/21/2023    Assessment & Plan   Blanco Osborne is a 57 year old male admitted on 1/21/2023 as a transfer from [facility] for evaluation of loculated pleural effusion. Patient has multiple medical problems including history of liver transplant 2016 due to alcohol abuse, pAF on chronic anticoagulation, history of DM2, CVA, HTN, CHRISTIAN on BiPAP. 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 Discharge to [facility], back to [facility] and back to [facility] during month of December.     Recently while at [facility] there were concerns for worsening effusions. He had thoracentesis 01/13 which returned exudative without growth on cultures. CT chest/abdomen/pelvis on 01/18 demonstrated worsening effusions and concerns for infected parapneumonic effusions with possible SBP.  CT findings discussed with Pulmonology team with 3 different pulmonologist including (Dr. Monge, Dr. Chu and Dr. Jay) with agreement recommended transfer to [facility] for consideration of chest tube placement and possible intrapleural lysis.See discharge summary 01/21/2023 for more details of his recent hx.     Acute metabolic encephalopathy  Delirium  Per wife at bedside patient has had waxing and waning mentation, coinciding with lactulose being held given at [facility].   -Patient continues to be confused here, part of it could be delirium  -Most recent ammonia level 44, blood cultures negative to date, UA not suggestive of infection, patient getting his lactulose on a regular basis and has diarrhea needing rectal tube  -Patient has needed sitter/mittens for last several days  -Continue sitter  -Patient removed his tracheostomy tube last night in addition to PICC line  -We will consult  psychiatry for assistance in management with his delirium     Bilateral pleural effusions, ? empyema  Acute now chronic respiratory failure requiring tracheostomy  - resolved    Pneumonia  - resolved   ARDS  - resolved    Right pneumothorax - resolved   Initial event was parainfluenza and strep pneumo pneumonia back in November 2022. Treated for ARDS. Had failed extubation x2 and tracheostomy performed last hospitalization.  Had additional complications of bilateral pneumothoraces as well as hydropneumothorax- pleural fluid grew candida parapsilosis  -Has already completed 4-week antifungal treatment. Chest tube was placed and subsequently removed. While in LTACH he was successfully weaned from the ventilator. His respiratory status is now stable on room air.   * Most recently, there has been concern for right complicated parapneumonic effusion. S/p thoracentesis 11/26 and 1/13.  Right thoracentesis 1/13 consistent with exudative effusion with elevated LDH, high neutrophils, cultures show NGTD. CT on 1/18 showed small bilateral partially loculated pleural effusions with associated pleural enhancement consistent with infected effusions.   -Evaluated by thoracic surgery(Dr. Jackson)  - CT chest 1/22, small effusions on imaging and so chest tube was not inserted.   - Continue mucomyst, albuterol and duonebs   -Thoracic surgery was consulted for assistance in trach decannulation, however patient pulled out his tracheostomy tube last night and is tolerating well without increased shortness of breath persistent cough or worsening hypoxia  -Continue gauze dressing  -Speech evaluated the patient, recommended ENT consult for dysphonia, will consult ENT as recommended by speech    Goals of care   As per report on 1/25 nursing had mentioned that patient had refused to undergo dialysis and want to stop care, palliative care was consulted.  Patient and his spouse denied wanting to stop care and wanted ongoing restorative care  including full code     S/p Liver transplant 2016   Cirrhosis with ascites  Subacute bacterial peritonitis  Main manifestations of this are hepatic encephalopathy and ascites.  Hepatologist at [place] felt that most likely reason for the cirrhosis was metabolic syndrome or alcohol intake.  There was no evidence of rejection on biopsy and there was some evidence of regeneration. Paracentesis done on 12/22/2022 showed lactobacillus indicating SBP, treated with Unasyn and Augmentin, finished 2-week course 1/12/2023.  Requires large-volume paracentesis periodically for recurring ascites.    * Paracentesis 1/13/2023 yielded about 7500 cc. Cultures NGTD.  Most recent paracentesis on 1/24 with 4.8 L fluid removal  - Continue intermittent paracentesis as needed if develops symptomatic ascites.    - Further treatment for recurrent ascites with TIPS deferred to his Liver Transplant team and/or Hepatology, he has an appointment on 4/21/2023 with Hepatology, Dr. Simms  - Continue Tacrolimus 1 mg BID.  - Continue Lactulose and Rifaximin      Diarrhea  Liquid stool requiring rectal tube. Patient transferred with the rectal tube   - C. difficile negative, diarrhea present, patient on scheduled lactulose for his cirrhosis     Paroxysmal atrial fibrillation  Remains in sinus rhythm.  On anticoagulation with apixaban indefinitely for stroke prophylaxis.    - Cardiac monitoring       ESRD: Now on iHD.  No return of renal function.  - MWF schedule   - Nephrology following for continuation of dialysis     Acute anemia  Pt has required intermittent transfusions for on-going anemia.   -Anemia most likely secondary to critical illness/chronic disease, chronic renal disease  -Hemoglobin has remained stable around 7-8 for last several days, no signs of active bleeding  -Transfuse packed red blood cells to keep Hb> 7  -Monitor H&H.     History PEA arrest   PEA arrest prior to his previous admission and 1 episode of PEA associated with  desaturation on 12/20.  No structural cardiac disease.   - Continue Cardiac Monitoring     Hypotension  Also has developed baseline hypotension 2/2 cirrhosis and prolonged critical illness.   -Continue current midodrine dose       Seizure after hypoxic event.  This is in the context of baseline multifactorial encephalopathy secondary to his ongoing critical illness and prior cardiac arrests and prior strokes and cirrhosis with hepatic encephalopathy. MRI of head of 12/20/22 reveals no PRES or leptomeningeal enhancement but scattered.  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   - Continue with  Keppra and Vimpat       Diabetes mellitus type 2  -Hemoglobin A1c on November 2022 was 4.7  -PTA looks like was on 30 units of Lantus every morning, currently only on MDSSI  -Hypoglycemic protocol in place  -Blood sugar over the last 24 hours reasonable, less than 150 on most readings  -We will continue with only sliding scale for now, monitor and adjust medication as needed     Moderate malnutrition, protein and calorie type.  -Continue with tube feeds via PEG tube, patient refusing to eat or have TF restarted. He reports ongoing nausea and distention.  Calorie count has been initiated, discussed that with family and discussed that with patient in detail.  He would need at least bolus feeding with poor calorie counts, patient is refusing bolus feeding and tube feeding as such.  - Nutritionist consulted and following     Anxiety  -Continue fluoxetine started on 1/28, consider dose increase in 2-3 weeks pending response       Diet: Soft & Bite Sized Diet (level 6) Liquidized/Moderately Thick (level 3)  Room Service  Adult Formula Bolus Feeding: Novasource Renal; Route: Gastrostomy; 3 times daily; Volume per Bolus: 240; mL(s); 80 mL/hr x 3 hrs back up bolus feeding after each meal ONLY IF INTAKE <50% of meal  Snacks/Supplements Adult: Other; 4 oz Ensure with breakfast,  Gelatein+ with lunch and magic cup with dinner (RD); With Meals    DVT Prophylaxis: DOAC  Nixon Catheter: Not present  Lines: PRESENT      PICC 11/24/22 Triple Lumen Right Brachial vein medial Access-Site Assessment: WDL  CVC Double Lumen Right External jugular Tunneled-Site Assessment: WDL      Cardiac Monitoring: None  Code Status: Full Code      Clinically Significant Risk Factors              # Hypoalbuminemia: Lowest albumin = 1.9 g/dL at 1/23/2023  6:38 AM, will monitor as appropriate            # Severe Malnutrition: based on nutrition assessment        Disposition Plan      Expected Discharge Date: 02/06/2023,  3:00 PM    Destination: inpatient rehabilitation facility  Discharge Comments: 1/25 Palliative consult. [facility] won't take pt.  1/27 placement, no more TF, on dialysis and not able to sit up  [facility]  Hospitalist Service  [facility]  Securely message with [email or web address] (more info)  Text page via [employer] Paging/Directory   ______________________________________________________________________    Interval History   Patient with no new complaints.  Overnight removed his tracheostomy tube and pulled his PICC access.  No other nursing concerns.  Physical Exam   Vital Signs: Temp: 97.9  F (36.6  C) Temp src: Oral BP: 115/75 Pulse: 101   Resp: 15 SpO2: 98 % O2 Device: Nasal cannula Oxygen Delivery: 2 LPM  Weight: 186 lbs 4.62 oz    Exam:  Constitutional: Awake and alert, appears comfortable  Head: Normocephalic. No masses, lesions, tenderness or abnormalities  ENT: ENT exam normal, no neck nodes or sinus tenderness  Cardiovascular: RRR.  No murmurs, no rubs or JVD  Respiratory: Normal WOB,b/l equal air entry, no wheezes or crackles   Gastrointestinal: Abdomen soft, non-tender. BS normal. No masses, organomegaly  : Deferred  Extremities : No edema , no clubbing or cyanosis        Medical Decision Making       45 MINUTES SPENT BY ME on  the date of service doing chart review, history, exam, documentation & further activities per the note.      Data         Imaging results reviewed over the past 24 hrs:   Recent Results (from the past 24 hour(s))   XR Chest Port 1 View    Narrative    EXAM: XR CHEST PORT 1 VIEW  LOCATION: [facility]  DATE/TIME: 2/2/2023 6:47 AM    INDICATION: PICC confirmation (pt pulled back); document central or not  COMPARISON: 1/29/2023.    FINDINGS: The right PICC has been pulled back and the tip is now at the junction of subclavian vein and superior vena cava. Right IJ infusion catheter in place. No pneumothorax. The heart is enlarged. There is no pulmonary edema. Right pleural effusion.   Mild bibasilar infiltrates.      Impression    IMPRESSION: Right PICC with tip at the junction of subclavian vein and SVC.     Recent Labs   Lab 02/02/23  0746 02/02/23  0047 02/01/23  2149 01/31/23  2123 01/31/23  1527 01/30/23  0758 01/30/23  0512 01/28/23  0733 01/28/23  0609   WBC  --   --   --   --  11.5*  --   --   --   --    HGB  --   --   --   --  7.7*  --   --   --   --    MCV  --   --   --   --  103*  --   --   --   --    PLT  --   --   --   --  164  --   --   --   --    NA  --   --   --   --   --   --  139  --  137   POTASSIUM  --   --   --   --   --   --  4.3  --  4.5   CHLORIDE  --   --   --   --   --   --  96*  --  97*   CO2  --   --   --   --   --   --  30*  --  32*   BUN  --   --   --   --   --   --  45.2*  --  23.6*   CR  --   --   --   --   --   --  3.99*  --  2.54*   ANIONGAP  --   --   --   --   --   --  13  --  8   KELLY  --   --   --   --   --   --  9.2  --  8.6   * 132* 165*   < >  --    < > 111*   < > 93   ALBUMIN  --   --   --   --   --   --   --   --  2.0*   PROTTOTAL  --   --   --   --   --   --   --   --  6.4   BILITOTAL  --   --   --   --   --   --   --   --  0.6   ALKPHOS  --   --   --   --   --   --   --   --  235*   ALT  --   --   --   --   --   --   --   --  5*   AST  --   --    --   --   --   --   --   --  19    < > = values in this interval not displayed.

## 2023-02-02 NOTE — PROVIDER NOTIFICATION
Brief update:    Paged as patient apparently decannulated trach overnight; RT went in to assess patient, and trach was out    Patient stable, oxygen saturations in the mid 90s    Discussed with nursing staff.  Okay to leave trach out, continue to monitor, support with oxygen as needed.  Keep trach at bedside in case respiratory compromise/failure occurs    If patient develops frequent/persistent coughing, consider trauma associated with decannulation; could result in blood in trachea which might result in a persistent cough or worsening hypoxia.  Deep suction could potentially confirm this diagnosis.  No such coughing currently reported.    Requested thoracic surgery team be courtesy updated as they are managing tracheostomy, though I do not anticipate a change in management here.    Dwain Armijo MD  1:11 AM\

## 2023-02-02 NOTE — CONSULTS
"Triage and Transition - Consult and Liaison   [phone]  February 2, 2023      Blanco Osborne  1965    Plan:     Continue care coordination with care team.  Maintain current transition plan.   Delirium Precautions  ? Up during the day with lights on, blinds open   ? Lights off at night, avoid interruptions dung the night as much as possible   ? Family visits, mementos from home (family photos, favorite blanket, ect) can be helpful   ? Encourage use of sensory aids (hearing aid, eyeglasses)   ? Frequent reorientation   ? Avoid opioids, benzodiazepines, anticholinergics if possible ?   ? Continue to ensure proper nutrition, fluid and electrolyte balance. Monitor for infections, hypoxia, metabolic derangements, or other causes of delirium     Presenting problem, including what brought patient to hospital: \"Patient has multiple medical problems including history of liver transplant 2016, PAF on chronic anticoagulation, history of DM2, CVA, HTN, CHRISTIAN on BiPAP, and history of alcohol abuse in the past causing liver damage ending with liver transplant.  in mid November he had respiratory arrest and was diagnosed with infection with parainfluenza and strep pneumoniae and acute on chronic renal insufficiency ending with CRF needing 3/week hemodialysis.  During this period he was diagnosed with cirrhosis of transplanted liver and hyperammonia.  Currently he is on Lactulose and Rifaximin.      On 12/14/22, due to CIP (critical illness polyneuropathy), he was transferred to [facility] for the first time with Trach, PEG and Rt chest tube.    On 12/16/2022 patient was transferred back to [facility] due to respiratory insufficiency secondary to hydropneumothorax and drainage of 900 cc bloody pleural effusion, bloody stool and transfusion of 2 units PRBC.    On 12/20/2022 he had another episode of PEA arrest followed by CPR x2 minutes and possibly had seizure activity in [facility]. 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 His respiratory status is stable on RA. He remains on dialysis MWF. He is being transferred to [facility] for management of complicated pleural     Reason for consult: Requested by care team to determine pharmacological and non-pharmacological interventions.     Reason for inability to complete assessment with patient: Pt is noted to be alert and oriented x2. He is noted to be restless and pulling PICC lines and trach. See notes from  2/1/23 and 2/2/23. He is not an accurate historian at this time.     Historical information: Mr. Osborne has noted to be diagnosed with metabolic encephalopathy. At baseline he lives at home with spouse Ofe. History of alcohol abuse. Not documented history of mental health diagnoses.     Medications:   Current Facility-Administered Medications   Medication     - MEDICATION INSTRUCTIONS for Dialysis Patients -     acetaminophen (TYLENOL) tablet 650 mg    Or     acetaminophen (TYLENOL) Suppository 650 mg     acetylcysteine (MUCOMYST) 20 % nebulizer solution 2 mL     albuterol (PROVENTIL) neb solution 2.5 mg     apixaban ANTICOAGULANT (ELIQUIS) tablet 5 mg     calcium carbonate (TUMS) chewable tablet 500 mg     carboxymethylcellulose PF (REFRESH PLUS) 0.5 % ophthalmic solution 1 drop     glucose gel 15-30 g    Or     dextrose 50 % injection 25-50 mL    Or     glucagon injection 1 mg     FLUoxetine (PROzac) capsule 10 mg     folic acid (FOLVITE) tablet 1 mg     [Held by provider] hydrOXYzine (ATARAX) syrup 25-50 mg     hydrOXYzine (ATARAX) tablet 10 mg     insulin aspart (NovoLOG) injection (RAPID ACTING)     insulin aspart (NovoLOG) injection (RAPID ACTING)     ipratropium - albuterol 0.5 mg/2.5 mg/3 mL (DUONEB) neb solution 3 mL     lacosamide (VIMPAT) solution 100 mg "     lactulose (CHRONULAC) solution 20 g     levETIRAcetam (KEPPRA) solution 1,000 mg     Lidocaine (LIDOCARE) 4 % Patch 1 patch     lidocaine (LMX4) cream     lidocaine 1 % 0.1-1 mL     lidocaine patch in PLACE     melatonin liquid 6 mg     midodrine (PROAMATINE) tablet 10 mg     mineral oil-hydrophilic petrolatum (AQUAPHOR)     mineral oil-hydrophilic petrolatum (AQUAPHOR)     naloxone (NARCAN) injection 0.2 mg    Or     naloxone (NARCAN) injection 0.4 mg    Or     naloxone (NARCAN) injection 0.2 mg    Or     naloxone (NARCAN) injection 0.4 mg     nitroGLYcerin (NITROSTAT) sublingual tablet 0.4 mg     nystatin (MYCOSTATIN) suspension 500,000 Units     ondansetron (ZOFRAN) injection 4 mg    Or     ondansetron (ZOFRAN ODT) ODT tab 4 mg     oxyCODONE IR (ROXICODONE) half-tab 2.5 mg     pantoprazole (PROTONIX) 2 mg/mL suspension 40 mg     [Held by provider] QUEtiapine (SEROquel) tablet 25 mg     rifaximin (XIFAXAN) tablet 550 mg     simethicone (MYLICON) chewable tablet 80 mg     sodium chloride (PF) 0.9% PF flush 10-30 mL     sodium chloride (PF) 0.9% PF flush 3 mL     sodium chloride (PF) 0.9% PF flush 3 mL     tacrolimus (GENERIC EQUIVALENT) suspension 1 mg     Medications Prior to Admission   Medication Sig Dispense Refill Last Dose     acetylcysteine (MUCOMYST) 20 % neb solution Take 2 mLs by nebulization 2 times daily 100 mL 1 1/20/2023     albuterol (PROVENTIL) (2.5 MG/3ML) 0.083% neb solution Take 1 vial (2.5 mg) by nebulization every 2 hours as needed for shortness of breath 90 mL 1 1/20/2023     apixaban ANTICOAGULANT (ELIQUIS) 5 MG tablet 1 tablet (5 mg) by Oral or Feeding Tube route 2 times daily 60 tablet 1 1/18/2023 at on hold     calcium carbonate (TUMS) 500 MG chewable tablet Take 1-2 chew tab by mouth 4 times daily as needed for heartburn        carboxymethylcellulose PF (REFRESH PLUS) 0.5 % ophthalmic solution Place 1 drop into both eyes 3 times daily 50 each 1 1/21/2023     famotidine (PEPCID) 20 MG  tablet Take 20 mg by mouth 2 times daily as needed (heartburn)        folic acid 5 MG/ML injection Inject 0.2 mLs (1 mg) into the vein daily 50 mL 1 1/21/2023     insulin glargine (LANTUS PEN) 100 UNIT/ML pen Inject 30 Units Subcutaneous every morning (before breakfast) 15 mL 1 1/21/2023 at 5 units     ipratropium - albuterol 0.5 mg/2.5 mg/3 mL (DUONEB) 0.5-2.5 (3) MG/3ML neb solution Take 1 vial (3 mLs) by nebulization 4 times daily 90 mL 1      lacosamide (VIMPAT) 10 MG/ML SOLN solution Take 100 mg by mouth 2 times daily        lactulose (CHRONULAC) 10 GM/15ML solution 30 mLs (20 g) by Oral or Feeding Tube route 3 times daily 100 mL 1 1/21/2023 at am     levETIRAcetam (KEPPRA) 100 MG/ML solution Take 1,000 mg by mouth every 24 hours        midodrine (PROAMATINE) 10 MG tablet 1 tablet (10 mg) by Oral or Feeding Tube route 3 times daily (with meals) 100 tablet 1 1/21/2023 at x3     nystatin (MYCOSTATIN) 274790 UNIT/ML suspension Swish and swallow 5 mLs (500,000 Units) in mouth 4 times daily 100 mL 1 1/21/2023 at 1800     ondansetron (ZOFRAN ODT) 4 MG ODT tab Take 1 tablet (4 mg) by mouth every 6 hours as needed for nausea or vomiting 30 tablet 1      oxyCODONE (ROXICODONE) 5 MG tablet 1 tablet (5 mg) by Per Feeding Tube route every 4 hours as needed for moderate pain (4-6) 60 tablet 0      pantoprazole (PROTONIX) 2 mg/mL SUSP suspension 20 mLs (40 mg) by Per Feeding Tube route every morning (before breakfast) 100 mL 1 1/21/2023 at am     rifaximin (XIFAXAN) 550 MG TABS tablet 1 tablet (550 mg) by Per Feeding Tube route 2 times daily 60 tablet 1 1/21/2023 at am     tacrolimus (GENERIC EQUIVALENT) 1 mg/mL suspension 1 mL (1 mg) by Oral or Feeding Tube route 2 times daily 100 mL 4 1/21/2023 at x2     glucose 40 % (400 mg/mL) gel Take 15-30 g by mouth every 15 minutes as needed for low blood sugar 100 mL 1      hydrocortisone sodium succinate PF (SOLU-CORTEF) 100 MG injection Inject 0.5 mLs (25 mg) into the vein every 6  hours (Patient not taking: Reported on 1/21/2023) 100 mL 1 Not Taking     Multiple Vitamins-Minerals (MULTIVITAMINS W/MINERALS) liquid 15 mLs by Per Feeding Tube route daily 100 mL 1      order for DME Equipment being ordered: Compression knee high stockings for B LE lymphedema 20-30mmHg (Patient not taking: Reported on 2/20/2019) 1 each 0      order for DME Equipment being ordered: Class 2 (30-40mmHg) compression knee high stockings, night time knee high compression alternative, bandaging supplies (Patient not taking: Reported on 2/20/2019) 1 each 0        Collateral information:   Reviewed chart and coordinated with psychiatric medication provider to comment on medication recommendations.     CLARA HILLS MSW, LICSW  Triage and Transition - Consult and Liaison   [phone]

## 2023-02-02 NOTE — PROGRESS NOTES
RT Note:    Pt found with decannulated trach when RT came in for rounds at ~0100. Patient is awake and alert, appropriately answering and asking questions, although he says he does not know how his trach came to be decannulated. SpO2 95% on RA, lung sounds diminished w/ coarse crackles.     Previous note from Thoracic Surgery on 02/01 @ 1507 states that the pt was already supposed to have a consult regarding trach decannulation on 02/01 but was in dialysis when the care team arrived for the consult, therefore the consult was rescheduled for later today 02/02/23.    PETER Putnam aware, and states he was able to get in touch with the hospitalist regarding the situation.    RT to follow.    Guido Mclain, RT, 1:22 AM, 02/02/23

## 2023-02-02 NOTE — PLAN OF CARE
Patient is A&Ox Self. Disoriented to place, time, situation . Vitals are stable on 2L NC. Patient is up with lift, and assist of 2. Sleep Quality quality is poor. Patient pulled out trach on shift. [facility] hospitalist, and Dr. Barlow's team. Patient non symptomatic. Patient also pulled PICC line out 5cm. Blood draws still possible. [facility] hospitalist, xray for placement requested. Rectal tube leaked multiple times on shift. Attempted to re-adjust Patient on hemodialysis. Plan is to continue to monitor.

## 2023-02-03 ENCOUNTER — APPOINTMENT (OUTPATIENT)
Dept: GENERAL RADIOLOGY | Facility: CLINIC | Age: 58
DRG: 981 | End: 2023-02-03
Attending: INTERNAL MEDICINE
Payer: COMMERCIAL

## 2023-02-03 LAB
GLUCOSE BLDC GLUCOMTR-MCNC: 103 MG/DL (ref 70–99)
GLUCOSE BLDC GLUCOMTR-MCNC: 112 MG/DL (ref 70–99)
GLUCOSE BLDC GLUCOMTR-MCNC: 155 MG/DL (ref 70–99)
GLUCOSE BLDC GLUCOMTR-MCNC: 90 MG/DL (ref 70–99)

## 2023-02-03 PROCEDURE — 99232 SBSQ HOSP IP/OBS MODERATE 35: CPT | Performed by: INTERNAL MEDICINE

## 2023-02-03 PROCEDURE — 634N000001 HC RX 634: Performed by: INTERNAL MEDICINE

## 2023-02-03 PROCEDURE — 250N000013 HC RX MED GY IP 250 OP 250 PS 637: Performed by: INTERNAL MEDICINE

## 2023-02-03 PROCEDURE — 99233 SBSQ HOSP IP/OBS HIGH 50: CPT | Performed by: INTERNAL MEDICINE

## 2023-02-03 PROCEDURE — 250N000011 HC RX IP 250 OP 636: Performed by: STUDENT IN AN ORGANIZED HEALTH CARE EDUCATION/TRAINING PROGRAM

## 2023-02-03 PROCEDURE — 250N000013 HC RX MED GY IP 250 OP 250 PS 637

## 2023-02-03 PROCEDURE — 90937 HEMODIALYSIS REPEATED EVAL: CPT

## 2023-02-03 PROCEDURE — 250N000013 HC RX MED GY IP 250 OP 250 PS 637: Performed by: STUDENT IN AN ORGANIZED HEALTH CARE EDUCATION/TRAINING PROGRAM

## 2023-02-03 PROCEDURE — 250N000009 HC RX 250: Performed by: STUDENT IN AN ORGANIZED HEALTH CARE EDUCATION/TRAINING PROGRAM

## 2023-02-03 PROCEDURE — G0463 HOSPITAL OUTPT CLINIC VISIT: HCPCS

## 2023-02-03 PROCEDURE — 71045 X-RAY EXAM CHEST 1 VIEW: CPT

## 2023-02-03 PROCEDURE — 250N000012 HC RX MED GY IP 250 OP 636 PS 637: Performed by: STUDENT IN AN ORGANIZED HEALTH CARE EDUCATION/TRAINING PROGRAM

## 2023-02-03 PROCEDURE — 120N000001 HC R&B MED SURG/OB

## 2023-02-03 RX ORDER — OLANZAPINE 5 MG/1
5 TABLET, ORALLY DISINTEGRATING ORAL 2 TIMES DAILY PRN
Status: DISCONTINUED | OUTPATIENT
Start: 2023-02-03 | End: 2023-04-28 | Stop reason: HOSPADM

## 2023-02-03 RX ADMIN — LIDOCAINE 1 PATCH: 560 PATCH PERCUTANEOUS; TOPICAL; TRANSDERMAL at 08:07

## 2023-02-03 RX ADMIN — Medication 40 MG: at 08:06

## 2023-02-03 RX ADMIN — MIDODRINE HYDROCHLORIDE 10 MG: 5 TABLET ORAL at 15:53

## 2023-02-03 RX ADMIN — TACROLIMUS 1 MG: 5 CAPSULE ORAL at 08:06

## 2023-02-03 RX ADMIN — Medication 40 MG: at 15:53

## 2023-02-03 RX ADMIN — RIFAXIMIN 550 MG: 550 TABLET ORAL at 21:28

## 2023-02-03 RX ADMIN — Medication 2.5 MG: at 15:54

## 2023-02-03 RX ADMIN — OLANZAPINE 5 MG: 5 TABLET, ORALLY DISINTEGRATING ORAL at 21:36

## 2023-02-03 RX ADMIN — MIDODRINE HYDROCHLORIDE 10 MG: 5 TABLET ORAL at 08:04

## 2023-02-03 RX ADMIN — LACOSAMIDE 100 MG: 10 SOLUTION ORAL at 10:03

## 2023-02-03 RX ADMIN — LACTULOSE 20 G: 10 SOLUTION ORAL at 08:06

## 2023-02-03 RX ADMIN — Medication 2.5 MG: at 08:05

## 2023-02-03 RX ADMIN — NYSTATIN 500000 UNITS: 100000 SUSPENSION ORAL at 08:18

## 2023-02-03 RX ADMIN — MIDODRINE HYDROCHLORIDE 10 MG: 5 TABLET ORAL at 18:40

## 2023-02-03 RX ADMIN — LACTULOSE 20 G: 10 SOLUTION ORAL at 21:35

## 2023-02-03 RX ADMIN — LEVETIRACETAM 1000 MG: 100 SOLUTION ORAL at 21:35

## 2023-02-03 RX ADMIN — APIXABAN 5 MG: 5 TABLET, FILM COATED ORAL at 21:28

## 2023-02-03 RX ADMIN — NYSTATIN 500000 UNITS: 100000 SUSPENSION ORAL at 18:40

## 2023-02-03 RX ADMIN — OLANZAPINE 5 MG: 5 TABLET, ORALLY DISINTEGRATING ORAL at 10:52

## 2023-02-03 RX ADMIN — APIXABAN 5 MG: 5 TABLET, FILM COATED ORAL at 08:04

## 2023-02-03 RX ADMIN — ONDANSETRON 4 MG: 4 TABLET, ORALLY DISINTEGRATING ORAL at 15:54

## 2023-02-03 RX ADMIN — FOLIC ACID 1 MG: 1 TABLET ORAL at 08:04

## 2023-02-03 RX ADMIN — ACETAMINOPHEN 650 MG: 325 TABLET, FILM COATED ORAL at 10:52

## 2023-02-03 RX ADMIN — NYSTATIN 500000 UNITS: 100000 SUSPENSION ORAL at 15:53

## 2023-02-03 RX ADMIN — NYSTATIN 500000 UNITS: 100000 SUSPENSION ORAL at 21:35

## 2023-02-03 RX ADMIN — WHITE PETROLATUM: 1.75 OINTMENT TOPICAL at 08:19

## 2023-02-03 RX ADMIN — RIFAXIMIN 550 MG: 550 TABLET ORAL at 08:04

## 2023-02-03 RX ADMIN — LACOSAMIDE 100 MG: 10 SOLUTION ORAL at 21:35

## 2023-02-03 RX ADMIN — EPOETIN ALFA-EPBX 13000 UNITS: 10000 INJECTION, SOLUTION INTRAVENOUS; SUBCUTANEOUS at 13:42

## 2023-02-03 RX ADMIN — Medication 6 MG: at 04:38

## 2023-02-03 RX ADMIN — ACETAMINOPHEN 650 MG: 325 TABLET, FILM COATED ORAL at 02:03

## 2023-02-03 ASSESSMENT — ACTIVITIES OF DAILY LIVING (ADL)
ADLS_ACUITY_SCORE: 60
ADLS_ACUITY_SCORE: 60
ADLS_ACUITY_SCORE: 57
ADLS_ACUITY_SCORE: 67
ADLS_ACUITY_SCORE: 60
ADLS_ACUITY_SCORE: 64
ADLS_ACUITY_SCORE: 67
ADLS_ACUITY_SCORE: 60
ADLS_ACUITY_SCORE: 67
ADLS_ACUITY_SCORE: 64
ADLS_ACUITY_SCORE: 67
ADLS_ACUITY_SCORE: 60

## 2023-02-03 NOTE — PROGRESS NOTES
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 On midodrine 10mg TID  No evidence of any renal recovery, is anuric/oliguric     2) Anemia, 7.7 1/31. 1/30 labs with ferritin 502, 30% iron sats reflecting adequate iron stores. 40,000 units epo PTA, 13,000 q HD here reflecting epo resistance.         Other:  Hx liver transplant 2016  Confusion/encephlopathy  DM  AMS, delerium     Plan/Recs:  1) HD today per chronic MWF schedule, goal UF 1kg  2) Epo 13,000 units with HD   3) important to keep sutures in place at exit site and catheter covered on chest as able. 4) will folllow peripherally over the weekend    Torsten Montaño DO  [facility] consultants  Office: [phone]  Cell: [phone]        Interval History:      Pt pulled out picc line overnight. Similar concern with his  Tunneled dialysis catheter. He remains with some altered mentation, occasional aggitation and with sitter.          Medications and Allergies:       - MEDICATION INSTRUCTIONS for Dialysis Patients -   Does not apply See Admin Instructions     sodium chloride 0.9%  250 mL Intravenous Once in dialysis/CRRT     sodium chloride 0.9%  300 mL Hemodialysis Machine Once     apixaban ANTICOAGULANT  5 mg Oral BID     epoetin mirta-epbx  13,000 Units Intravenous Once in dialysis/CRRT     folic acid  1 mg Oral or Feeding Tube Daily     insulin aspart  1-7 Units Subcutaneous TID AC     insulin aspart  1-5 Units Subcutaneous At Bedtime     lacosamide  100 mg Oral or Feeding Tube BID     lactulose  20 g Oral BID     levETIRAcetam  1,000 mg Oral or Feeding Tube Q24H     lidocaine  1 patch Transdermal Q24H     lidocaine   Transdermal Q8H BIJU     midodrine  10 mg Oral or Feeding Tube TID w/meals     mineral oil-hydrophilic petrolatum    Topical Daily     - MEDICATION INSTRUCTIONS -   Does not apply Once     nystatin  500,000 Units Swish & Spit 4x Daily     pantoprazole  40 mg Oral or Feeding Tube BID AC     [Held by provider] QUEtiapine  25 mg Oral At Bedtime     rifaximin  550 mg Oral or Feeding Tube BID     sodium chloride (PF)  10-30 mL Intracatheter Q8H     sodium chloride (PF)  3 mL Intracatheter Q8H     tacrolimus  1 mg Oral BID IS      No Known Allergies         Physical Exam:   Vitals were reviewed  BP 98/71   Pulse 98   Temp 97.2  F (36.2  C) (Oral)   Resp 28   Wt 84.5 kg (186 lb 4.6 oz)   SpO2 99%   BMI 25.27 kg/m      Wt Readings from Last 3 Encounters:   01/31/23 84.5 kg (186 lb 4.6 oz)   01/21/23 82.4 kg (181 lb 11.2 oz)   12/28/22 96 kg (211 lb 10.3 oz)       Intake/Output Summary (Last 24 hours) at 2/3/2023 1138  Last data filed at 2/3/2023 0845  Gross per 24 hour   Intake 350 ml   Output 420 ml   Net -70 ml     GENERAL APPEARANCE: frail, weak appearing  HEENT:  unremarkable  CV: RRR, nl S1/S2  EXTREMITIES/SKIN: no c/c/rashes/lesions; 1+ dependent edema  LINES:  Right tunneled dialysis catheter present, no visible abnormalities. Sutures remain in place, no change in appearance and no exposure of cuff.            Data:     BMP  Recent Labs   Lab 02/03/23  0807 02/02/23  2222 02/02/23  1836 02/02/23  1314 01/30/23  0758 01/30/23  0512 01/28/23  0733 01/28/23  0609   NA  --   --   --   --   --  139  --  137   POTASSIUM  --   --   --   --   --  4.3  --  4.5   CHLORIDE  --   --   --   --   --  96*  --  97*   KELLY  --   --   --   --   --  9.2  --  8.6   CO2  --   --   --   --   --  30*  --  32*   BUN  --   --   --   --   --  45.2*  --  23.6*   CR  --   --   --   --   --  3.99*  --  2.54*   GLC 90 104* 115* 109*   < > 111*   < > 93    < > = values in this interval not displayed.     CBC  Recent Labs   Lab 01/31/23  1527   WBC 11.5*   HGB 7.7*   HCT 26.7*   *        Lab Results   Component Value Date    AST 19 01/28/2023     ALT 5 (L) 01/28/2023    ALKPHOS 235 (H) 01/28/2023    BILITOTAL 0.6 01/28/2023    CHANDLER 44 01/31/2023     Lab Results   Component Value Date    INR 1.36 (H) 12/21/2022       Attestation:  I have reviewed today's vital signs, notes, medications, labs and imaging.    DO [facility] - Nephrology  Office: [phone]  Cell: [phone]

## 2023-02-03 NOTE — PLAN OF CARE
Patient is A&O to self. Disoriented to time, place, situation. Vitals are stable on room air. Patient complains of abdominal pain. Managed with oxy and tylenol. Patient has new marks/scratches visible on back, which appeared after wife was scratching back on day shift. Patient is up with assist of 2 and lift. Rectal tube in place, draining adequately, leaked once on shift. Sleep Quality poor. Gave PRN Melatonin. Dialysis port found partially pulled out. Informed hospitalist, xray ordered to determine placement. Port can not be used for dialysis until after xray.

## 2023-02-03 NOTE — PROGRESS NOTES
Attempted to see patient who is out of room, presumably had a procedure.  I do note that he pulled out his dialysis catheter part way.  Zyprexa 5 mg ODT twice daily as needed written, which is what I would have recommended.  I will try to see the patient again on Monday.

## 2023-02-03 NOTE — CONSULTS
[facility] Nurse Inpatient Assessment     Consulted for:  Trach site    Summary: Pt s/p self-removal of trach, with no imminent plan for replacement.  Site appears clean, stable.  Agree with current plan of gauze dressing.  Defer to MD/ Thoracic Surgery for suture removal and if any complications to area.  Small neighboring skin tear, superficial.     Patient History (according to provider note(s):      Blanco Osborne is a 57 year old male admitted on 1/21/2023 as a transfer from [facility] for evaluation of loculated pleural effusion. Patient has multiple medical problems including history of liver transplant 2016 due to alcohol abuse, pAF on chronic anticoagulation, history of DM2, CVA, HTN, CHRISTIAN on BiPAP. 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  He was recovering in [facility] and was transferred to [facility] for consideration of chest tube placement and possible intrapleural lysis but worsening effusion    Areas Assessed:      Areas visualized during today's visit: trach site area    Wound location:  Trach site    Last photo: 2-3-23 trach site      2-3-23 left of trach site      Wound due to: tracheostomy  Wound history/plan of care: pt pulled out trach 2/1, chart reviewed, no plan for replacement at this time as pt tolerating well  Wound base: moist pink mucosal tissue, sutures present; neighboring skin tear moist pink dermis, bleeding     Palpation of the wound bed: normal      Drainage: small secretions     Description of drainage: red and tan     Measurements (length x width x depth, in cm): approx 2 x 2cm area, not probed for depth; skin tear approx 2 x 0.8 x <0.1cm   Periwound skin: intact except for above skin tear      Color: normal and consistent with surrounding tissue      Temperature: normal    Odor: none  Pain: denies     Pain interventions prior to dressing change: slow and gentle cares   Treatment goal: Drainage control and Protection  STATUS: initial assessment  Supplies ordered: supplies stored on unit       Treatment Plan:     Would continue gauze dressings for now to manage secretions and provide some occlusion.     Trach site: BID and prn:  1.  Cleanse with saline-moist gauze, dry well.  2.  Apply folded small gauze over trach site.   3.  Secure with gentle tape.  Use skin prep prn to protect from tape stripping.    -Defer to MD for suture removal     Left of trach site skin tear - cleanse with saline, cover with non-adherent dressing, ie PolyMem or Optifoam Gentle Border.  Change every 2-3 days and prn until healed over.     Defer to MD/ Thoracic Surgery if trach needs to be replaced.     Orders: Written    RECOMMEND PRIMARY TEAM ORDER: None, at this time  Education provided: plan of care  Discussed plan of care with: Patient and Nurse  WOC nurse follow-up plan: weekly and prn  Notify WOC if wound(s) deteriorate.  Nursing to notify the Provider(s) and re-consult the WOC Nurse if new skin concern.    DATA:     Current support surface: Standard  Atmos Air mattress  Containment of urine/stool: Incontinence Protocol, rectal tube, dialysis  BMI: Body mass index is 25.27 kg/m .   Active diet order: Orders Placed This Encounter      Soft & Bite Sized Diet (level 6) Liquidized/Moderately Thick (level 3)     Output: I/O last 3 completed shifts:  In: 480 [NG/GT:480]  Out: 1420 [Urine:20; Other:1000; Stool:400]     Labs: Recent Labs   Lab 01/31/23  1527 01/28/23  0609   ALBUMIN  --  2.0*   HGB 7.7*  --    WBC 11.5*  --      Pressure injury risk assessment:   Sensory Perception: 3-->slightly limited  Moisture: 3-->occasionally moist  Activity: 2-->chairfast  Mobility: 2-->very limited  Nutrition: 2-->probably inadequate  Friction and Shear: 2-->potential problem  Kareem Score: 14    Roberta Parks RN  CWOCN  -Securely message with [employer] (more info) - can reach individually by name or search 'WOC Nurse' (Jacobo) to reach current WOCs on duty.  -WOC Office Phone: [phone]

## 2023-02-03 NOTE — PROVIDER NOTIFICATION
Brief update    Paged as patient partially pulled out his dialysis port    Occlusive dressing placed    Chest x-ray ordered to determine current location    AM team should ensure that nephrology is aware of partially displaced dialysis catheter prior to attempts at dialyzing    Dwain Armijo MD  5:13 AM

## 2023-02-03 NOTE — PLAN OF CARE
Goal Outcome Evaluation:      A&Ox1, disoriented to place,time and situation. VSS except hypotensive, on RA.  Consuming up to 50-70% of meals. Stool incontinence. Pt is up w/ lift. On chair x2 this shift. Oxygen saturation is in high 90%.  ON HD, scheduled for Dialysis tomorrow. PIV SL.

## 2023-02-03 NOTE — PROGRESS NOTES
[facility]    Medicine Progress Note - Hospitalist Service    Date of Admission:  1/21/2023    Assessment & Plan   Blanco Osborne is a 57 year old male admitted on 1/21/2023 as a transfer from [facility] for evaluation of loculated pleural effusion. Patient has multiple medical problems including history of liver transplant 2016 due to alcohol abuse, pAF on chronic anticoagulation, history of DM2, CVA, HTN, CHRISTIAN on BiPAP. 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  He was recovering in [facility] and was transferred to [facility] for consideration of chest tube placement and possible intrapleural lysis but worsening effusion    Acute metabolic encephalopathy  Delirium  Per wife at bedside patient has had waxing and waning mentation, coinciding with lactulose being held given at [facility].   -Patient continues to be confused here, and has pulled out tracheostomy tube, PICC line and last night he also pulled his dialysis catheter  -Chest x-ray to evaluate for dialysis catheter misplacement is pending  -Patient has needed sitter/mittens for last several days, now needing soft restraints  -Continue sitter  -Appreciate psychiatry input(last seen 2/4), recommended discontinuation of Vistaril and Prozac which was recently started  -Most recent ammonia level 44, blood cultures negative to date, UA not suggestive of infection, patient getting his lactulose on a regular basis and has diarrhea needing rectal tube  -Patient apparently had not tolerated Seroquel before, will add as needed Zyprexa  -Will check ammonia level, Keppra and lacosamide level, tacrolimus level CBC and CMP to rule out any metabolic cause of delirium     Bilateral pleural effusions, ? empyema  Acute now chronic respiratory failure requiring tracheostomy  -  resolved    Pneumonia  - resolved   ARDS  - resolved    Right pneumothorax - resolved   Initial event was parainfluenza and strep pneumo pneumonia back in November 2022. Treated for ARDS. Had failed extubation x2 and tracheostomy performed on previous hospitalization.  Had additional complications of bilateral pneumothoraces as well as hydropneumothorax- pleural fluid grew candida parapsilosis  -Has already completed 4-week antifungal treatment. [facility] from [facility].   -However recently there were concern for worsening effusion while at [facility] and had thoracentesis 01/13 which returned exudative without growth on cultures. CT chest/abdomen/pelvis on 01/18 demonstrated worsening effusions and concerns for infected parapneumonic effusions with possible SBP.  Multiple pulmonologist at [facility] reviewed CT scan recommended transfer to [facility] for consideration of chest tube placement and possible intrapleural lysis  -Evaluated by thoracic surgery(Dr. Jackson)  - CT chest 1/22, small effusions on imaging and so chest tube was not inserted.   - Continue mucomyst, albuterol and duonebs.  Patient has been stable on room air for several days now  -Thoracic surgery was consulted for assistance in trach decannulation, however patient pulled out his tracheostomy tube on the night 2/1-2/2 and is tolerating well without increased shortness of breath persistent cough or worsening hypoxia  -Continue gauze dressing  -Speech evaluated the patient, recommended ENT consult for dysphonia, and has been consulted as per recommendation  -Wound care consult for tracheostomy site care     Goals of care   As per report on 1/25 nursing had mentioned that patient had refused to undergo dialysis and want to stop care, palliative care was consulted.  Patient and his spouse denied wanting to stop care and wanted ongoing restorative care including full code     ESRD:   Now on HD.  No return of renal  function.  - MWF schedule.  Patient pulled his dialysis catheter as above, chest x-ray pending  - Nephrology following for continuation of dialysis     S/p Liver transplant 2016   Cirrhosis with ascites  Subacute bacterial peritonitis  Main manifestations of this are hepatic encephalopathy and ascites.  Hepatologist at [place] felt that most likely reason for the cirrhosis was metabolic syndrome or alcohol intake.  There was no evidence of rejection on biopsy and there was some evidence of regeneration. Paracentesis done on 12/22/2022 showed lactobacillus indicating SBP, treated with Unasyn and Augmentin, finished 2-week course 1/12/2023.  Requires large-volume paracentesis periodically for recurring ascites.    * Paracentesis 1/13/2023 yielded about 7500 cc. Cultures NGTD.  Most recent paracentesis on 1/24 with 4.8 L fluid removal  - Continue intermittent paracentesis as needed if develops symptomatic ascites.    - Further treatment for recurrent ascites with TIPS deferred to his Liver Transplant team and/or Hepatology, he has an appointment on 4/21/2023 with Hepatology, Dr. Simms  - Continue Tacrolimus 1 mg BID.  - Continue Lactulose and Rifaximin      Diarrhea  Liquid stool requiring rectal tube. Patient transferred with the rectal tube   - C. difficile negative, diarrhea present, patient on scheduled lactulose for his cirrhosis     Paroxysmal atrial fibrillation  Remains in sinus rhythm.  On anticoagulation with apixaban indefinitely for stroke prophylaxis.       Acute anemia  Pt has required intermittent transfusions for on-going anemia.   -Anemia most likely secondary to critical illness/chronic disease, chronic renal disease  -Hemoglobin has remained stable around 7-8 for last several days, no signs of active bleeding  -Transfuse packed red blood cells to keep Hb> 7  -Monitor H&H.     History PEA arrest   PEA arrest prior to his previous admission and 1 episode of PEA associated with desaturation on 12/20.   No structural cardiac disease.   - Continue Cardiac Monitoring     Hypotension  Also has developed baseline hypotension 2/2 cirrhosis and prolonged critical illness.   -Continue current midodrine dose       Seizure after hypoxic event.  This is in the context of baseline multifactorial encephalopathy secondary to his ongoing critical illness and prior cardiac arrests and prior strokes and cirrhosis with hepatic encephalopathy. MRI of head of 12/20/22 reveals no PRES or leptomeningeal enhancement but scattered.  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   - Continue with  Keppra and Vimpat       Diabetes mellitus type 2  -Hemoglobin A1c on November 2022 was 4.7  -PTA looks like was on 30 units of Lantus every morning, currently only on MDSSI  -Hypoglycemic protocol in place  -Blood sugar over the last 24 hours reasonable, less than 150 on most readings  -We will continue with only sliding scale for now, monitor and adjust medication as needed     Moderate malnutrition, protein and calorie type.  -Continue with tube feeds via PEG tube  - Nutritionist consulted and following     Anxiety  -Prozac was discontinued as per psychiatry recommendation on 2/2(see consult note for details), patient has not tolerated Seroquel before per report, will add Zyprexa as needed     Diet: Soft & Bite Sized Diet (level 6) Liquidized/Moderately Thick (level 3)  Room Service  Adult Formula Bolus Feeding: Novasource Renal; Route: Gastrostomy; 3 times daily; Volume per Bolus: 240; mL(s); 80 mL/hr x 3 hrs back up bolus feeding after each meal ONLY IF INTAKE <50% of meal  Snacks/Supplements Adult: Other; 4 oz Ensure with breakfast, Gelatein+ with lunch and magic cup with dinner (RD); With Meals    DVT Prophylaxis: DOAC  Nixon Catheter: Not present  Lines: PRESENT      CVC Double Lumen Right External jugular Tunneled-Site Assessment: WDL except;Other (Comment) (pulled partially out)       Cardiac Monitoring: None  Code Status: Full Code      Clinically Significant Risk Factors              # Hypoalbuminemia: Lowest albumin = 1.9 g/dL at 1/23/2023  6:38 AM, will monitor as appropriate            # Severe Malnutrition: based on nutrition assessment        Disposition Plan     Expected Discharge Date: 02/06/2023,  3:00 PM    Destination: inpatient rehabilitation facility  Discharge Comments: 1/25 Palliative consult. [facility] won't take pt.  1/27 placement, no more TF, on dialysis and not able to sit up  1/31 referral sent to [facility]          Rylie Jenkins MD  Hospitalist Service  [facility]  Securely message with [employer] (more info)  Text page via [facility] Paging/Directory   ______________________________________________________________________    Interval History   Overnight events reviewed.  Patient pulled his dialysis catheter.  This morning seems confused and anxious.  Not redirectable  Physical Exam   Vital Signs: Temp: 98.2  F (36.8  C) Temp src: Oral BP: 116/71 Pulse: 89   Resp: 18 SpO2: 96 % O2 Device: None (Room air)    Weight: 186 lbs 4.62 oz    Exam:  Constitutional: Awake and alert, appears comfortable  Head: Normocephalic. No masses, lesions, tenderness or abnormalities  ENT: ENT exam normal, no neck nodes or sinus tenderness  Cardiovascular: RRR.  No murmurs, no rubs or JVD  Respiratory: Normal WOB,b/l equal air entry, no wheezes or crackles   Gastrointestinal: Abdomen soft, non-tender. BS normal. No masses, organomegaly  : Deferred  Extremities : No edema , no clubbing or cyanosis        Medical Decision Making       40 MINUTES SPENT BY ME on the date of service doing chart review, history, exam, documentation & further activities per the note.      Data         Imaging results reviewed over the past 24 hrs:   Recent Results (from the past 24 hour(s))   XR Chest Port 1 View    Narrative    CHEST PORTABLE ONE VIEW  2/3/2023 9:39 AM       INDICATION: Partially  pulled dialysis port, evaluate placement.  COMPARISON: 2/2/2023       Impression    IMPRESSION: Right IJ dialysis catheter remains in good position with  tip in the right atrium. Previously seen PICC line no longer visible.  Cardiomegaly. Chronic small bilateral pleural effusions with adjacent  atelectasis in the lung bases. No pulmonary edema.        Recent Labs   Lab 02/03/23  0807 02/02/23  2222 02/02/23  1836 01/31/23  2123 01/31/23  1527 01/30/23  0758 01/30/23  0512 01/28/23  0733 01/28/23  0609   WBC  --   --   --   --  11.5*  --   --   --   --    HGB  --   --   --   --  7.7*  --   --   --   --    MCV  --   --   --   --  103*  --   --   --   --    PLT  --   --   --   --  164  --   --   --   --    NA  --   --   --   --   --   --  139  --  137   POTASSIUM  --   --   --   --   --   --  4.3  --  4.5   CHLORIDE  --   --   --   --   --   --  96*  --  97*   CO2  --   --   --   --   --   --  30*  --  32*   BUN  --   --   --   --   --   --  45.2*  --  23.6*   CR  --   --   --   --   --   --  3.99*  --  2.54*   ANIONGAP  --   --   --   --   --   --  13  --  8   KELLY  --   --   --   --   --   --  9.2  --  8.6   GLC 90 104* 115*   < >  --    < > 111*   < > 93   ALBUMIN  --   --   --   --   --   --   --   --  2.0*   PROTTOTAL  --   --   --   --   --   --   --   --  6.4   BILITOTAL  --   --   --   --   --   --   --   --  0.6   ALKPHOS  --   --   --   --   --   --   --   --  235*   ALT  --   --   --   --   --   --   --   --  5*   AST  --   --   --   --   --   --   --   --  19    < > = values in this interval not displayed.

## 2023-02-03 NOTE — PROGRESS NOTES
Potassium   Date Value Ref Range Status   01/30/2023 4.3 3.4 - 5.3 mmol/L Final   12/29/2022 3.4 3.4 - 5.3 mmol/L Final   04/20/2020 3.9 3.4 - 5.3 mmol/L Final     Potassium POCT   Date Value Ref Range Status   01/19/2023 3.6 3.5 - 5.0 mmol/L Final     Hemoglobin   Date Value Ref Range Status   01/31/2023 7.7 (L) 13.3 - 17.7 g/dL Final   04/20/2020 14.3 13.3 - 17.7 g/dL Final     Creatinine   Date Value Ref Range Status   01/30/2023 3.99 (H) 0.67 - 1.17 mg/dL Final   04/20/2020 1.06 0.66 - 1.25 mg/dL Final     Urea Nitrogen   Date Value Ref Range Status   01/30/2023 45.2 (H) 6.0 - 20.0 mg/dL Final   12/29/2022 46 (H) 7 - 30 mg/dL Final   04/20/2020 23 7 - 30 mg/dL Final     Sodium   Date Value Ref Range Status   01/30/2023 139 136 - 145 mmol/L Final   04/20/2020 139 133 - 144 mmol/L Final     INR   Date Value Ref Range Status   12/21/2022 1.36 (H) 0.85 - 1.15 Final   10/07/2019 1.20 (H) 0.86 - 1.14 Final      Latest Reference Range & Units Most Recent   Hep B Surface Agn Nonreactive  Nonreactive  2/2/23 06:18   Hepatitis B Surface Antibody Instrument Value <8.00 m[IU]/mL 0.54  2/2/23 06:18   Hepatitis B Surface Antibody  Nonreactive  2/2/23 06:18     DIALYSIS PROCEDURE NOTE  Hepatitis status of previous patient on machine log was checked and verified ok to use with this patients hepatitis status.  Patient dialyzed for 3 hrs. on a K3 bath with a net fluid removal of  1L.  A BFR of 400 ml/min was obtained via a CVC Tunelled.The treatment plan was discussed with Dr. Montaño during the treatment.    Total heparin received during the treatment: 0 units.       Line flushed, clamped and capped with heparin 1:1000 1.9 mL (1900 units) per lumen    Meds  given: retacrit 13,000 units   Complications: no dialysis related complications.       ICEBOAT? Timeout performed pre-treatment  I: Patient was identified using 2 identifiers  C:  Consent Signed Yes  E: Equipment preventative maintenance is current and dialysis delivery system  OK to use  B: Hepatitis B Surface Antigen: NONREACTIVE; Draw Date: 02/02/23      Hepatitis B Surface Antibody: SUSCEPTIBLE; Draw Date: 02/02/23  O: Dialysis orders present and complete prior to treatment  A: Vascular access verified and assessed prior to treatment  T: Treatment was performed at a clinically appropriate time  ?: Patient was allowed to ask questions and address concerns prior to treatment  See Adult Hemodialysis flowsheet in [facility] for further details and post assessment.  Machine water alarm in place and functioning. Transducer pods intact and checked every 15min.   Pt returned via bed.  Chlorine/Chloramine water system checked every 4 hours.        Patient repositioned every 2 hours during the treatment.  Post treatment report given to TIFFANIE Sheldon RN regarding 1L of fluid removed.

## 2023-02-03 NOTE — PROVIDER NOTIFICATION
MD Notification    Notified Person: MD    Notified Person Name: Rylie Jenkins    Notification Date/Time: 2/3/23 1005    Notification Interaction: [employer] Messaging    Purpose of Notification: Request for soft wrist restraints and medication for anxiety/agitation.    Orders Received: Soft restraints ordered, PRN zyprexa ordered.    Comments:

## 2023-02-04 LAB
GLUCOSE BLDC GLUCOMTR-MCNC: 124 MG/DL (ref 70–99)
GLUCOSE BLDC GLUCOMTR-MCNC: 131 MG/DL (ref 70–99)
GLUCOSE BLDC GLUCOMTR-MCNC: 136 MG/DL (ref 70–99)
GLUCOSE BLDC GLUCOMTR-MCNC: 152 MG/DL (ref 70–99)

## 2023-02-04 PROCEDURE — 250N000013 HC RX MED GY IP 250 OP 250 PS 637: Performed by: STUDENT IN AN ORGANIZED HEALTH CARE EDUCATION/TRAINING PROGRAM

## 2023-02-04 PROCEDURE — 250N000013 HC RX MED GY IP 250 OP 250 PS 637: Performed by: INTERNAL MEDICINE

## 2023-02-04 PROCEDURE — 99232 SBSQ HOSP IP/OBS MODERATE 35: CPT | Performed by: INTERNAL MEDICINE

## 2023-02-04 PROCEDURE — 120N000001 HC R&B MED SURG/OB

## 2023-02-04 PROCEDURE — 250N000013 HC RX MED GY IP 250 OP 250 PS 637

## 2023-02-04 PROCEDURE — 250N000012 HC RX MED GY IP 250 OP 636 PS 637: Performed by: STUDENT IN AN ORGANIZED HEALTH CARE EDUCATION/TRAINING PROGRAM

## 2023-02-04 PROCEDURE — 250N000009 HC RX 250: Performed by: STUDENT IN AN ORGANIZED HEALTH CARE EDUCATION/TRAINING PROGRAM

## 2023-02-04 RX ADMIN — MIDODRINE HYDROCHLORIDE 10 MG: 5 TABLET ORAL at 07:50

## 2023-02-04 RX ADMIN — WHITE PETROLATUM: 1.75 OINTMENT TOPICAL at 07:50

## 2023-02-04 RX ADMIN — TACROLIMUS 1 MG: 5 CAPSULE ORAL at 07:49

## 2023-02-04 RX ADMIN — Medication 40 MG: at 07:49

## 2023-02-04 RX ADMIN — FOLIC ACID 1 MG: 1 TABLET ORAL at 07:49

## 2023-02-04 RX ADMIN — NYSTATIN 500000 UNITS: 100000 SUSPENSION ORAL at 14:11

## 2023-02-04 RX ADMIN — Medication 2.5 MG: at 10:04

## 2023-02-04 RX ADMIN — MIDODRINE HYDROCHLORIDE 10 MG: 5 TABLET ORAL at 17:46

## 2023-02-04 RX ADMIN — Medication 6 MG: at 00:56

## 2023-02-04 RX ADMIN — ACETAMINOPHEN 650 MG: 325 TABLET, FILM COATED ORAL at 10:04

## 2023-02-04 RX ADMIN — LACOSAMIDE 100 MG: 10 SOLUTION ORAL at 10:04

## 2023-02-04 RX ADMIN — APIXABAN 5 MG: 5 TABLET, FILM COATED ORAL at 07:49

## 2023-02-04 RX ADMIN — LACTULOSE 20 G: 10 SOLUTION ORAL at 07:48

## 2023-02-04 RX ADMIN — Medication 40 MG: at 17:46

## 2023-02-04 RX ADMIN — LIDOCAINE 1 PATCH: 560 PATCH PERCUTANEOUS; TOPICAL; TRANSDERMAL at 07:48

## 2023-02-04 RX ADMIN — MIDODRINE HYDROCHLORIDE 10 MG: 5 TABLET ORAL at 14:11

## 2023-02-04 RX ADMIN — NYSTATIN 500000 UNITS: 100000 SUSPENSION ORAL at 07:49

## 2023-02-04 RX ADMIN — ACETAMINOPHEN 650 MG: 325 TABLET, FILM COATED ORAL at 14:11

## 2023-02-04 RX ADMIN — SIMETHICONE 80 MG: 80 TABLET, CHEWABLE ORAL at 00:24

## 2023-02-04 RX ADMIN — RIFAXIMIN 550 MG: 550 TABLET ORAL at 07:49

## 2023-02-04 RX ADMIN — OLANZAPINE 5 MG: 5 TABLET, ORALLY DISINTEGRATING ORAL at 20:25

## 2023-02-04 ASSESSMENT — ACTIVITIES OF DAILY LIVING (ADL)
ADLS_ACUITY_SCORE: 64

## 2023-02-04 NOTE — PROGRESS NOTES
[facility]    Medicine Progress Note - Hospitalist Service       Date of Admission:  1/21/2023  Assessment & Plan   Blanco Osborne is a 57 year-old male admitted on 1/21/2023 as a transfer from [facility] for evaluation of loculated pleural effusion. Patient has multiple medical problems including history of liver transplant 2016 due to alcohol abuse, pAF on chronic anticoagulation, history of diabetes mellitus type 2, CVA, hypertension, CHRISTIAN on BiPAP. 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  He was recovering in [facility] and was transferred to [facility] for consideration of chest tube placement and possible intrapleural lysis for worsening effusion      Acute metabolic encephalopathy   *Per wife at bedside patient has had waxing and waning mentation, coinciding with lactulose being held given at [facility].   *Patient continues to be confused here, and has pulled out tracheostomy tube, PICC line and pulled his dialysis catheter  - Mentation improving 2/4/2023   - Re-orient as needed  - Maintain normal day/night, sleep wake cycles  - Minimize sedating/altering medications as able  - Treat separate conditions as detailed above/below   - Sitter as needed  - Continue lactulose   - Olanzapine BID PRN     Bilateral pleural effusions   Acute now chronic respiratory failure requiring tracheostomy  - resolved    Pneumonia  - resolved   ARDS  - resolved    Right pneumothorax - resolved   *Initial event was parainfluenza and strep pneumo pneumonia back in November 2022. Treated for ARDS. Had failed extubation x2 and tracheostomy performed on previous hospitalization. *Had additional complications of bilateral pneumothoraces as well as hydropneumothorax- pleural fluid grew candida parapsilosis  *Has already completed 4-week  antifungal treatment. While in LTACH he was successfully weaned from the ventilator.  *Recently there were concern for worsening effusion while at [facility] and had thoracentesis 01/13 which returned exudative without growth on cultures. CT chest/abdomen/pelvis on 01/18 demonstrated worsening effusions and concerns for infected parapneumonic effusions with possible SBP.  Multiple pulmonologist at [facility] reviewed CT scan recommended transfer to [facility] for consideration of chest tube placement and possible intrapleural lysis  *Thoracic surgery (Dr. Jackson) consulted during admission   *CT chest 1/22, small effusions on imaging and so chest tube was not inserted.   *Patient pulled out his tracheostomy tube on the night 2/1-2/2 and is tolerating well without increased shortness of breath persistent cough or worsening hypoxia  - Respiratory status on room air  - Wound care consult for tracheostomy site care      Goals of care   As per report on 1/25 nursing had mentioned that patient had refused to undergo dialysis and want to stop care, palliative care was consulted.  Patient and his spouse denied wanting to stop care and wanted ongoing restorative care including full code     ESRD  Now on HD.  No return of renal function.  - MWF schedule.  Patient pulled his dialysis catheter as above, chest x-ray pending  - Nephrology following for continuation of dialysis     S/p Liver transplant 2016   Cirrhosis with ascites  Subacute bacterial peritonitis  Main manifestations of this are hepatic encephalopathy and ascites.  Hepatologist at [place] felt that most likely reason for the cirrhosis was metabolic syndrome or alcohol intake.  There was no evidence of rejection on biopsy and there was some evidence of regeneration. Paracentesis done on 12/22/2022 showed lactobacillus indicating SBP, treated with Unasyn and Augmentin, finished 2-week course 1/12/2023.  Requires large-volume paracentesis periodically for recurring  ascites.    * Paracentesis 1/13/2023 yielded about 7500 cc. Cultures NGTD.  Most recent paracentesis on 1/24 with 4.8 L fluid removal  - Continue intermittent paracentesis as needed if develops symptomatic ascites.    - Further treatment for recurrent ascites with TIPS deferred to his Liver Transplant team and/or Hepatology, he has an appointment on 4/21/2023 with Hepatology, Dr. Simms  - Continue Tacrolimus 1 mg BID.  - Continue lactulose and rifaximin      Diarrhea  Liquid stool requiring rectal tube. Patient transferred with the rectal tube   - C. difficile negative, diarrhea present, patient on scheduled lactulose for his cirrhosis     Paroxysmal atrial fibrillation  Remains in sinus rhythm.  On anticoagulation with apixaban indefinitely for stroke prophylaxis.       Acute anemia  Pt has required intermittent transfusions for on-going anemia.   -Anemia most likely secondary to critical illness/chronic disease, chronic renal disease  -Hemoglobin has remained stable around 7-8 for last several days, no signs of active bleeding  -Transfuse packed red blood cells to keep Hb> 7  -Monitor H&H.     Dysphonia, persistent  - ENT consulted (non-urgent)     History PEA arrest   PEA arrest prior to his previous admission and 1 episode of PEA associated with desaturation on 12/20.  No structural cardiac disease.   - Continue Cardiac Monitoring     Hypotension  Also has developed baseline hypotension 2/2 cirrhosis and prolonged critical illness.   -Continue current midodrine dose       Seizure after hypoxic event.  This is in the context of baseline multifactorial encephalopathy secondary to his ongoing critical illness and prior cardiac arrests and prior strokes and cirrhosis with hepatic encephalopathy. MRI of head of 12/20/22 reveals no PRES or leptomeningeal enhancement but scattered.  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   - Continue levetiracetam, lacosamide        Diabetes mellitus type 2  *Hemoglobin A1c on November 2022 was 4.7  *PTA looks like was on 30 units of Lantus every morning, currently only on MDSSI  - Can discontinue blood sugars and sliding scale insulin, has been stable without insulin needs     Moderate malnutrition, protein and calorie type.  -Continue with tube feeds via PEG tube  - Nutritionist consulted and following     Anxiety  Fluoxetine was discontinued as per psychiatry recommendation on 2/2 (see consult note for details).       Clinically Significant Risk Factors              # Hypoalbuminemia: Lowest albumin = 1.9 g/dL at 1/23/2023  6:38 AM, will monitor as appropriate            # Severe Malnutrition: based on nutrition assessment           Diet: Soft & Bite Sized Diet (level 6) Liquidized/Moderately Thick (level 3)  Room Service  Adult Formula Bolus Feeding: Novasource Renal; Route: Gastrostomy; 3 times daily; Volume per Bolus: 240; mL(s); 80 mL/hr x 3 hrs back up bolus feeding after each meal ONLY IF INTAKE <50% of meal  Snacks/Supplements Adult: Other; 4 oz Ensure with breakfast, Gelatein+ with lunch and magic cup with dinner (RD); With Meals    DVT Prophylaxis: Pneumatic Compression Devices  Nixon Catheter: Not present  Code Status: Full Code      Disposition Plan      Expected Discharge Date: 02/07/2023,  3:00 PM    Destination: inpatient rehabilitation facility  Discharge Comments: 1/25 Palliative consult. [facility] won't take pt.  1/27 placement, no more TF, on dialysis and not able to sit up  1/31 [facility]     Entered: Brenden Aramndo MD 02/04/2023, 2:31 PM       The patient's care was discussed with the patient and bedside RN     Brenden Armando MD  Hospitalist Service  [facility]    ______________________________________________________________________    Interval History   No acute events overnight. Patient mentation improved today, alert and following commands.     Data reviewed  today: I reviewed all medications, new labs and imaging results over the last 24 hours. I personally reviewed no images or EKG's today.    Physical Exam   Vital Signs: Temp: 98.9  F (37.2  C) Temp src: Oral BP: 120/77 Pulse: 96   Resp: 18 SpO2: 94 % O2 Device: None (Room air)    Weight: 186 lbs 4.62 oz    Constitutional: Chronically ill appearing, NAD  Respiratory: Normal respiratory effort at rest   Cardiovascular: RRR.  GI: Soft, non-tender, non-distended. BS normoactive.   Skin/Integumen: Warm, dry  Other:      Data   Recent Labs   Lab 02/04/23  1156 02/04/23  0743 02/03/23  2138 01/31/23  2123 01/31/23  1527 01/30/23  0758 01/30/23  0512   WBC  --   --   --   --  11.5*  --   --    HGB  --   --   --   --  7.7*  --   --    MCV  --   --   --   --  103*  --   --    PLT  --   --   --   --  164  --   --    NA  --   --   --   --   --   --  139   POTASSIUM  --   --   --   --   --   --  4.3   CHLORIDE  --   --   --   --   --   --  96*   CO2  --   --   --   --   --   --  30*   BUN  --   --   --   --   --   --  45.2*   CR  --   --   --   --   --   --  3.99*   ANIONGAP  --   --   --   --   --   --  13   KELLY  --   --   --   --   --   --  9.2   * 124* 155*   < >  --    < > 111*    < > = values in this interval not displayed.       No results found for this or any previous visit (from the past 24 hour(s)).  Medications       - MEDICATION INSTRUCTIONS for Dialysis Patients -   Does not apply See Admin Instructions     apixaban ANTICOAGULANT  5 mg Oral BID     folic acid  1 mg Oral or Feeding Tube Daily     insulin aspart  1-7 Units Subcutaneous TID AC     insulin aspart  1-5 Units Subcutaneous At Bedtime     lacosamide  100 mg Oral or Feeding Tube BID     lactulose  20 g Oral BID     levETIRAcetam  1,000 mg Oral or Feeding Tube Q24H     lidocaine  1 patch Transdermal Q24H     lidocaine   Transdermal Q8H BIJU     midodrine  10 mg Oral or Feeding Tube TID w/meals     mineral oil-hydrophilic petrolatum   Topical Daily      nystatin  500,000 Units Swish & Spit 4x Daily     pantoprazole  40 mg Oral or Feeding Tube BID AC     [Held by provider] QUEtiapine  25 mg Oral At Bedtime     rifaximin  550 mg Oral or Feeding Tube BID     sodium chloride (PF)  10-30 mL Intracatheter Q8H     sodium chloride (PF)  3 mL Intracatheter Q8H     tacrolimus  1 mg Oral BID IS

## 2023-02-04 NOTE — PLAN OF CARE
Alert and oriented to self only. VSS on RA. Up Ax2, lift. Soft, bite sized, mod thick (level 3) liquid diet, poor oral intake.  Bolus TF through G tube given. PIV in RUE, SL.  CVC to R chest wall. Trach site, dressing CDI. Pt c/o abdominal cramping, relieved with PRN simethicone. Lungs coarse. Scattered scratches. Rectal tube in place, leaking. Pt on hemodialysis.  Restless, poor sleep over night.  Sitter in room, no restraints or mitts needed this shift. Continue plan of care.
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
## 2023-02-05 ENCOUNTER — APPOINTMENT (OUTPATIENT)
Dept: GENERAL RADIOLOGY | Facility: CLINIC | Age: 58
DRG: 981 | End: 2023-02-05
Attending: INTERNAL MEDICINE
Payer: COMMERCIAL

## 2023-02-05 ENCOUNTER — APPOINTMENT (OUTPATIENT)
Dept: SPEECH THERAPY | Facility: CLINIC | Age: 58
DRG: 981 | End: 2023-02-05
Attending: STUDENT IN AN ORGANIZED HEALTH CARE EDUCATION/TRAINING PROGRAM
Payer: COMMERCIAL

## 2023-02-05 LAB
BACTERIA BLD CULT: NO GROWTH
GLUCOSE BLDC GLUCOMTR-MCNC: 131 MG/DL (ref 70–99)
GLUCOSE BLDC GLUCOMTR-MCNC: 99 MG/DL (ref 70–99)

## 2023-02-05 PROCEDURE — 99232 SBSQ HOSP IP/OBS MODERATE 35: CPT | Performed by: INTERNAL MEDICINE

## 2023-02-05 PROCEDURE — 120N000001 HC R&B MED SURG/OB

## 2023-02-05 PROCEDURE — 250N000013 HC RX MED GY IP 250 OP 250 PS 637: Performed by: INTERNAL MEDICINE

## 2023-02-05 PROCEDURE — 74018 RADEX ABDOMEN 1 VIEW: CPT

## 2023-02-05 PROCEDURE — 250N000009 HC RX 250: Performed by: STUDENT IN AN ORGANIZED HEALTH CARE EDUCATION/TRAINING PROGRAM

## 2023-02-05 PROCEDURE — 92526 ORAL FUNCTION THERAPY: CPT | Mod: GN

## 2023-02-05 PROCEDURE — 250N000012 HC RX MED GY IP 250 OP 636 PS 637: Performed by: STUDENT IN AN ORGANIZED HEALTH CARE EDUCATION/TRAINING PROGRAM

## 2023-02-05 PROCEDURE — 250N000013 HC RX MED GY IP 250 OP 250 PS 637

## 2023-02-05 PROCEDURE — 250N000013 HC RX MED GY IP 250 OP 250 PS 637: Performed by: STUDENT IN AN ORGANIZED HEALTH CARE EDUCATION/TRAINING PROGRAM

## 2023-02-05 RX ORDER — LACTULOSE 10 G/15ML
20 SOLUTION ORAL 3 TIMES DAILY
Status: DISCONTINUED | OUTPATIENT
Start: 2023-02-05 | End: 2023-02-08

## 2023-02-05 RX ADMIN — FOLIC ACID 1 MG: 1 TABLET ORAL at 08:14

## 2023-02-05 RX ADMIN — LACOSAMIDE 100 MG: 10 SOLUTION ORAL at 02:06

## 2023-02-05 RX ADMIN — APIXABAN 5 MG: 5 TABLET, FILM COATED ORAL at 21:58

## 2023-02-05 RX ADMIN — NYSTATIN 500000 UNITS: 100000 SUSPENSION ORAL at 12:07

## 2023-02-05 RX ADMIN — Medication 40 MG: at 08:13

## 2023-02-05 RX ADMIN — LACTULOSE 20 G: 10 SOLUTION ORAL at 02:06

## 2023-02-05 RX ADMIN — MIDODRINE HYDROCHLORIDE 10 MG: 5 TABLET ORAL at 12:07

## 2023-02-05 RX ADMIN — LACTULOSE 20 G: 10 SOLUTION ORAL at 21:58

## 2023-02-05 RX ADMIN — LEVETIRACETAM 1000 MG: 100 SOLUTION ORAL at 02:32

## 2023-02-05 RX ADMIN — LACTULOSE 20 G: 10 SOLUTION ORAL at 16:57

## 2023-02-05 RX ADMIN — Medication 2.5 MG: at 02:27

## 2023-02-05 RX ADMIN — Medication 6 MG: at 02:32

## 2023-02-05 RX ADMIN — SIMETHICONE 80 MG: 80 TABLET, CHEWABLE ORAL at 02:27

## 2023-02-05 RX ADMIN — RIFAXIMIN 550 MG: 550 TABLET ORAL at 22:00

## 2023-02-05 RX ADMIN — MIDODRINE HYDROCHLORIDE 10 MG: 5 TABLET ORAL at 08:13

## 2023-02-05 RX ADMIN — NYSTATIN 500000 UNITS: 100000 SUSPENSION ORAL at 08:13

## 2023-02-05 RX ADMIN — TACROLIMUS 1 MG: 5 CAPSULE ORAL at 08:14

## 2023-02-05 RX ADMIN — MIDODRINE HYDROCHLORIDE 10 MG: 5 TABLET ORAL at 22:00

## 2023-02-05 RX ADMIN — Medication 2.5 MG: at 15:45

## 2023-02-05 RX ADMIN — LACOSAMIDE 100 MG: 10 SOLUTION ORAL at 08:15

## 2023-02-05 RX ADMIN — ACETAMINOPHEN 650 MG: 325 TABLET, FILM COATED ORAL at 09:55

## 2023-02-05 RX ADMIN — LACTULOSE 20 G: 10 SOLUTION ORAL at 08:13

## 2023-02-05 RX ADMIN — NYSTATIN 500000 UNITS: 100000 SUSPENSION ORAL at 21:58

## 2023-02-05 RX ADMIN — SIMETHICONE 80 MG: 80 TABLET, CHEWABLE ORAL at 09:55

## 2023-02-05 RX ADMIN — TACROLIMUS 1 MG: 5 CAPSULE ORAL at 22:01

## 2023-02-05 RX ADMIN — TACROLIMUS 1 MG: 5 CAPSULE ORAL at 02:06

## 2023-02-05 RX ADMIN — LACOSAMIDE 100 MG: 10 SOLUTION ORAL at 22:00

## 2023-02-05 RX ADMIN — ACETAMINOPHEN 650 MG: 325 TABLET, FILM COATED ORAL at 15:44

## 2023-02-05 RX ADMIN — Medication 40 MG: at 17:20

## 2023-02-05 RX ADMIN — WHITE PETROLATUM: 1.75 OINTMENT TOPICAL at 08:14

## 2023-02-05 RX ADMIN — Medication 2.5 MG: at 09:55

## 2023-02-05 RX ADMIN — RIFAXIMIN 550 MG: 550 TABLET ORAL at 02:05

## 2023-02-05 RX ADMIN — APIXABAN 5 MG: 5 TABLET, FILM COATED ORAL at 08:15

## 2023-02-05 RX ADMIN — RIFAXIMIN 550 MG: 550 TABLET ORAL at 08:13

## 2023-02-05 ASSESSMENT — ACTIVITIES OF DAILY LIVING (ADL)
ADLS_ACUITY_SCORE: 64
ADLS_ACUITY_SCORE: 63
ADLS_ACUITY_SCORE: 63
ADLS_ACUITY_SCORE: 64
ADLS_ACUITY_SCORE: 64
ADLS_ACUITY_SCORE: 63
ADLS_ACUITY_SCORE: 67
ADLS_ACUITY_SCORE: 63
ADLS_ACUITY_SCORE: 63
ADLS_ACUITY_SCORE: 64
ADLS_ACUITY_SCORE: 63
ADLS_ACUITY_SCORE: 64

## 2023-02-05 NOTE — PLAN OF CARE
AxO to self only. VSS on RA. Calm, easily re-directed. Poor oral diet intake - soft, bite size, mod thick liq. Total of 480ml Novasource TF given during shift. Trach site cleansed w/ NS, gauze dressing applied. LS coarse. Scattered scratches. Barrier cream applied to perineum d/t leaking rectal tube, multiple inct. episodes. Recommend WOC RN to be consulted on Monday for this issue. Pt on hemodialysis MWF. 1:1 sitter in room, no restraints or mitts needed this shift. Up w/ assist x 2 + lift. Repeat requests to get up into chair despite tolerating only 15-20min in chair. Continue plan of care.

## 2023-02-05 NOTE — PLAN OF CARE
[id number]    Orientations: A&O to self only. Restraints in place upon shift arrival r/t pulling at lines-- removed at 0900 w/ family in room along with 1:1 sitter at bedside.   Vitals/Pain: VSS on RA. Pain localized to abdomen controlled by PRN Simethicone, Tylenol, & Oxycodone.   Lines/Drains: R forearm PIV SL. CVC heparin locked.  Skin/Wounds: Scattered abrasions & bruising. Blanchable redness on bottom.   GI/: Pt on HD & does not produce urine. Rectal tube in place leaking small to moderate amount. Soft, bite sized & mod thick liquid diet, but poor PO intake. Bolus TF through GT given X1. Able to eat about 50% of meal during lunch.  Ambulation/Assist: A2 w/ lift use for transfers. Up in chair for a few hours.  Plan: Plan for HD run tomorrow. Continue poc.

## 2023-02-05 NOTE — PROGRESS NOTES
[facility]    Medicine Progress Note - Hospitalist Service       Date of Admission:  1/21/2023  Assessment & Plan   Blanco Osborne is a 57 year-old male admitted on 1/21/2023 as a transfer from [facility] for evaluation of loculated pleural effusion. Patient has multiple medical problems including history of liver transplant 2016 due to alcohol abuse, pAF on chronic anticoagulation, history of diabetes mellitus type 2, CVA, hypertension, CHRISTIAN on BiPAP. 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  He was recovering in [facility] and was transferred to [facility] for consideration of chest tube placement and possible intrapleural lysis for worsening effusion      Acute metabolic encephalopathy   *Per wife at bedside patient has had waxing and waning mentation, coinciding with lactulose being held given at [facility].   *Patient continues to be confused here, and has pulled out tracheostomy tube, PICC line and pulled his dialysis catheter  - Mental status continues to wax and wane   - Re-orient as needed  - Maintain normal day/night, sleep wake cycles  - Minimize sedating/altering medications as able  - Treat separate conditions as detailed above/below   - Increase lactulose to TID for increased asterixis on exam   - Olanzapine BID PRN  - Restraints, sitter as needed     Bilateral pleural effusions   Acute now chronic respiratory failure requiring tracheostomy  - resolved    Pneumonia  - resolved   ARDS  - resolved    Right pneumothorax - resolved   *Initial event was parainfluenza and strep pneumo pneumonia back in November 2022. Treated for ARDS. Had failed extubation x2 and tracheostomy performed on previous hospitalization. *Had additional complications of bilateral pneumothoraces as well as hydropneumothorax- pleural  fluid grew candida parapsilosis  *Has already completed 4-week antifungal treatment. While in LTACH he was successfully weaned from the ventilator.  *Recently there were concern for worsening effusion while at [facility] and had thoracentesis 01/13 which returned exudative without growth on cultures. CT chest/abdomen/pelvis on 01/18 demonstrated worsening effusions and concerns for infected parapneumonic effusions with possible SBP.  Multiple pulmonologist at [facility] reviewed CT scan recommended transfer to [facility] for consideration of chest tube placement and possible intrapleural lysis  *Thoracic surgery (Dr. Jackson) consulted during admission   *CT chest 1/22, small effusions on imaging and so chest tube was not inserted.   *Patient pulled out his tracheostomy tube on the night 2/1-2/2 and is tolerating well without increased shortness of breath persistent cough or worsening hypoxia  - Respiratory status stable on room air  - Wound care consult for tracheostomy site care      Goals of care   As per report on 1/25 nursing had mentioned that patient had refused to undergo dialysis and want to stop care, palliative care was consulted.  Patient and his spouse denied wanting to stop care and wanted ongoing restorative care including full code     ESRD  Now on HD.  No return of renal function.  - Nephrology following, continues on M-W-F schedule      S/p Liver transplant 2016   Cirrhosis with ascites  Subacute bacterial peritonitis  *Main manifestations of this are hepatic encephalopathy and ascites.  Hepatologist at [place] felt that most likely reason for the cirrhosis was metabolic syndrome or alcohol intake.  There was no evidence of rejection on biopsy and there was some evidence of regeneration. Paracentesis done on 12/22/2022 showed lactobacillus indicating SBP, treated with Unasyn and Augmentin, finished 2-week course 1/12/2023.  Requires large-volume paracentesis periodically for recurring  ascites.    *Paracentesis 1/13/2023 yielded about 7500 cc. Cultures NGTD.  Most recent paracentesis on 1/24 with 4.8 L fluid removal  - Continue intermittent paracentesis as needed if develops symptomatic ascites.    - Further treatment for recurrent ascites with TIPS deferred to his Liver Transplant team and/or Hepatology, he has an appointment on 4/21/2023 with Hepatology, Dr. Simms  - Continue Tacrolimus 1 mg BID.  - Continue rifaximin   - Lactulose increased as above      Diarrhea  C difficile negative. Secondary to lactulose.  - Continue rectal tube     Paroxysmal atrial fibrillation  Remains in sinus rhythm.  On anticoagulation with apixaban indefinitely for stroke prophylaxis.       Acute anemia  Pt has required intermittent transfusions for on-going anemia.   - Anemia most likely secondary to critical illness/chronic disease, chronic renal disease  - CBC in AM   - Transfuse packed red blood cells to keep Hb> 7     Dysphonia, persistent  - ENT consulted per SLP recommendations (non-urgent)     History PEA arrest   PEA arrest prior to his previous admission and 1 episode of PEA associated with desaturation on 12/20.  No structural cardiac disease.   - Continue Cardiac Monitoring     Hypotension  Also has developed baseline hypotension 2/2 cirrhosis and prolonged critical illness.   - Continue midodrine       Seizure after hypoxic event.  This is in the context of baseline multifactorial encephalopathy secondary to his ongoing critical illness and prior cardiac arrests and prior strokes and cirrhosis with hepatic encephalopathy. MRI of head of 12/20/22 reveals no PRES or leptomeningeal enhancement but scattered.  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   - Continue levetiracetam, lacosamide       Diabetes mellitus type 2  *Hemoglobin A1c on November 2022 was 4.7  *PTA looks like was on 30 units of Lantus every morning, currently only on MDSSI  - Off blood  sugar checks and sliding scale insulin as has been stable without insulin needs     Moderate malnutrition, protein and calorie type.  - Continue with tube feeds via PEG tube  - Nutritionist consulted and following     Anxiety  Fluoxetine was discontinued as per psychiatry recommendation on 2/2 (see consult note for details).       Clinically Significant Risk Factors              # Hypoalbuminemia: Lowest albumin = 1.9 g/dL at 1/23/2023  6:38 AM, will monitor as appropriate            # Severe Malnutrition: based on nutrition assessment           Diet: Soft & Bite Sized Diet (level 6) Liquidized/Moderately Thick (level 3)  Room Service  Adult Formula Bolus Feeding: Novasource Renal; Route: Gastrostomy; 3 times daily; Volume per Bolus: 240; mL(s); 80 mL/hr x 3 hrs back up bolus feeding after each meal ONLY IF INTAKE <50% of meal  Snacks/Supplements Adult: Other; 4 oz Ensure with breakfast, Gelatein+ with lunch and magic cup with dinner (RD); With Meals    DVT Prophylaxis: Pneumatic Compression Devices  Nixon Catheter: Not present  Code Status: Full Code      Disposition Plan      Expected Discharge Date: 02/09/2023      Destination: inpatient rehabilitation facility  Discharge Comments: 1/25 Palliative consult. [facility] won't take pt.  1/27 placement, no more TF, on dialysis and not able to sit up  1/31 referral sent to [facility] rehab     Entered: Brenden Armando MD 02/05/2023, 1:30 PM       The patient's care was discussed with the patient and patient's wife    Brenden Armando MD  Hospitalist Service  [facility]    ______________________________________________________________________    Interval History   Overnight became agitated again required restraints. Restraints removed this morning. Has been intermittently sleepy and confused by wife. Unable to obtain any additional history from patient due to his encephalopathy.     Data reviewed today: I reviewed all medications, new labs and  imaging results over the last 24 hours. I personally reviewed no images or EKG's today.    Physical Exam   Vital Signs: Temp: 97.7  F (36.5  C) Temp src: Axillary BP: 90/55 Pulse: 101   Resp: 18 SpO2: 93 % O2 Device: None (Room air)    Weight: 186 lbs 4.62 oz    Constitutional: Chronically ill appearing, NAD  Respiratory: Normal respiratory effort at rest   Cardiovascular: RRR.  GI:   Skin/Integumen: Warm, dry  Neuro:  Sleepy. Mild asterixis.     Data   Recent Labs   Lab 02/05/23  1203 02/05/23  0816 02/04/23  2236 01/31/23  2123 01/31/23  1527 01/30/23  0758 01/30/23  0512   WBC  --   --   --   --  11.5*  --   --    HGB  --   --   --   --  7.7*  --   --    MCV  --   --   --   --  103*  --   --    PLT  --   --   --   --  164  --   --    NA  --   --   --   --   --   --  139   POTASSIUM  --   --   --   --   --   --  4.3   CHLORIDE  --   --   --   --   --   --  96*   CO2  --   --   --   --   --   --  30*   BUN  --   --   --   --   --   --  45.2*   CR  --   --   --   --   --   --  3.99*   ANIONGAP  --   --   --   --   --   --  13   KELLY  --   --   --   --   --   --  9.2   * 99 136*   < >  --    < > 111*    < > = values in this interval not displayed.       Recent Results (from the past 24 hour(s))   XR Abdomen Port 1 View    Narrative    EXAM: XR ABDOMEN PORT 1 VIEW  LOCATION: [facility]  DATE/TIME: 2/5/2023 1:44 AM    INDICATION: G tube placement confirmation  COMPARISON: None.      Impression    IMPRESSION: Contrast material injected through the G-tube is within the stomach. No extraluminal contrast.     Medications       - MEDICATION INSTRUCTIONS for Dialysis Patients -   Does not apply See Admin Instructions     apixaban ANTICOAGULANT  5 mg Oral BID     folic acid  1 mg Oral or Feeding Tube Daily     lacosamide  100 mg Oral or Feeding Tube BID     lactulose  20 g Oral BID     levETIRAcetam  1,000 mg Oral or Feeding Tube Q24H     lidocaine  1 patch Transdermal Q24H     lidocaine    Transdermal Q8H BIJU     midodrine  10 mg Oral or Feeding Tube TID w/meals     mineral oil-hydrophilic petrolatum   Topical Daily     nystatin  500,000 Units Swish & Spit 4x Daily     pantoprazole  40 mg Oral or Feeding Tube BID AC     [Held by provider] QUEtiapine  25 mg Oral At Bedtime     rifaximin  550 mg Oral or Feeding Tube BID     sodium chloride (PF)  10-30 mL Intracatheter Q8H     sodium chloride (PF)  3 mL Intracatheter Q8H     tacrolimus  1 mg Oral BID IS

## 2023-02-05 NOTE — PROGRESS NOTES
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 On midodrine 10mg TID  No evidence of any renal recovery, is anuric/oliguric     2) Anemia, 7.7 1/31. 1/30 labs with ferritin 502, 30% iron sats reflecting adequate iron stores. 40,000 units epo PTA, 13,000 q HD here reflecting epo resistance.      3) disposition:   not returning to LTACH. In case not able to go to inpatient rehab, would consider [facility] facility for TCU/rehab and dialysis. Checking hep B antigen, tristan and core tristan along with quant-gold for potential admission to outpatinet dialysis unit. These will be good for 30 days.      Other:  Hx liver transplant 2016  Confusion/encephlopathy  DM  AMS, delerium     Plan/Recs:  1) HD tomorrow per chronic MWF schedule, goal UF 1kg  2) Epo 13,000 units with HD   3) weekly labs ordered for AM to help adjust potassium bath on dialysis, epo dosing, etc.  4) checking admission labs for potential [facility] admission  5) suggest [facility] as TCU if not able to go to inpatient rehab    Torsten Montaño DO  [facility]  Office: [phone]  Cell: [phone]        Interval History:      Brother at bedside, reports sees him most days. Thinks he was better yesterday but overall much better this week then last. Continued aggitation at times.   Pt denies any dyspnea, has occasional cough.          Medications and Allergies:       - MEDICATION INSTRUCTIONS for Dialysis Patients -   Does not apply See Admin Instructions     apixaban ANTICOAGULANT  5 mg Oral BID     folic acid  1 mg Oral or Feeding Tube Daily     lacosamide  100 mg Oral or Feeding Tube BID     lactulose  20 g Oral BID     levETIRAcetam  1,000 mg Oral or Feeding Tube Q24H     lidocaine  1 patch Transdermal Q24H     lidocaine   Patient has given consent to record this visit for documentation in their clinical record.      Transdermal Q8H BIJU     midodrine  10 mg Oral or Feeding Tube TID w/meals     mineral oil-hydrophilic petrolatum   Topical Daily     nystatin  500,000 Units Swish & Spit 4x Daily     pantoprazole  40 mg Oral or Feeding Tube BID AC     [Held by provider] QUEtiapine  25 mg Oral At Bedtime     rifaximin  550 mg Oral or Feeding Tube BID     sodium chloride (PF)  10-30 mL Intracatheter Q8H     sodium chloride (PF)  3 mL Intracatheter Q8H     tacrolimus  1 mg Oral BID IS      No Known Allergies         Physical Exam:   Vitals were reviewed  /79 (BP Location: Right arm, Patient Position: Supine, Cuff Size: Adult Regular)   Pulse 95   Temp 97.7  F (36.5  C) (Axillary)   Resp 16   Wt 84.5 kg (186 lb 4.6 oz)   SpO2 96%   BMI 25.27 kg/m      Wt Readings from Last 3 Encounters:   01/31/23 84.5 kg (186 lb 4.6 oz)   01/21/23 82.4 kg (181 lb 11.2 oz)   12/28/22 96 kg (211 lb 10.3 oz)       Intake/Output Summary (Last 24 hours) at 2/5/2023 1043  Last data filed at 2/5/2023 1000  Gross per 24 hour   Intake 1340 ml   Output --   Net 1340 ml       GENERAL APPEARANCE: frail, weak appearing  HEENT:  unremarkable, trach site covered  CV: RRR, nl S1/S2  EXTREMITIES/SKIN: no c/c/rashes/lesions; 1+ dependent edema  LINES:  Right tunneled dialysis catheter present, no visible abnormalities.           Data:     BMP  Recent Labs   Lab 02/05/23  0816 02/04/23  2236 02/04/23  1746 02/04/23  1156 01/30/23  0758 01/30/23  0512   NA  --   --   --   --   --  139   POTASSIUM  --   --   --   --   --  4.3   CHLORIDE  --   --   --   --   --  96*   KELLY  --   --   --   --   --  9.2   CO2  --   --   --   --   --  30*   BUN  --   --   --   --   --  45.2*   CR  --   --   --   --   --  3.99*   GLC 99 136* 152* 131*   < > 111*    < > = values in this interval not displayed.     CBC  Recent Labs   Lab 01/31/23  1527   WBC 11.5*   HGB 7.7*   HCT 26.7*   *        Lab Results   Component Value Date    AST 19 01/28/2023    ALT 5 (L)  01/28/2023    ALKPHOS 235 (H) 01/28/2023    BILITOTAL 0.6 01/28/2023    CHANDLER 44 01/31/2023     Lab Results   Component Value Date    INR 1.36 (H) 12/21/2022       Attestation:  I have reviewed today's vital signs, notes, medications, labs and imaging.    DO [facility] - Nephrology  Office: [phone]  Cell: [phone]

## 2023-02-05 NOTE — PLAN OF CARE
Alert and oriented to self only; mood is labile.  Can be paranoid, restless and anxious.  Pulls at lines.  Pt pulled at G-tube, blood noted in tube. X-ray ordered to re-confirm placement:  x-ray showed placement ok.   Sitter in place, soft wrist restraints ordered and in place; PRN PO Zyprexa given. VSS on RA. Up Ax2, lift. Soft, bite sized, mod thick (level 3) liquid diet, poor oral intake.  Bolus TF through G tube given. PIV in RUE, SL.  CVC to R chest wall. Trach site, dressing CDI. Pt c/o abdominal cramping, relieved with PRN simethicone and oxycodone. Lungs coarse. Scattered scratches and abrasions. Rectal tube in place, leaking. Pt on hemodialysis. Poor sleep over night.  Continue plan of care.

## 2023-02-05 NOTE — PROGRESS NOTES
Patient with TF now   Rec soft restraints to get zyprexa po ordered   Will give in the TF     DR Alyssa Jimenez

## 2023-02-05 NOTE — PROVIDER NOTIFICATION
"Paged on call hospitalist Dr. Jimenez at 1956:    \"ANITHA Burns.,    Pt is getting more agitated and paranoid, and continues to pull at his lines.  He only has PO Zyprexa ordered and refuses to take it; could he have an IM order?  Thanks,    PETER Newsome *[id number]\"    Soft restraints ordered.  Writer was able to get PO Zyprexa in orally.    Paged Dr. Jimenez again at 2043:    \"ANITHA Burns.,    Pt had pulled at G-tube, some blood noted in the tube, no blood at site.  Could we get an x-ray to confirm placement before I put meds in? I was able to get PO zyprexa in orally.  Thanks!  PETER Newsome *[id number]\"  "

## 2023-02-05 NOTE — PROVIDER NOTIFICATION
"Paged on call hospitalist Dr. Quiroz at 2217:    \"ANITHA Burns.,    Ab xray ordered to confirm G-tube placement, after pt. pulled on it.  Radiology wanted to confirm with or without contrast.  Please advise.    Thanks  PETER Newsome *[phone]\"    Provider ordered x-ray with contrast.  "

## 2023-02-06 LAB
ALBUMIN SERPL BCG-MCNC: 2.2 G/DL (ref 3.5–5.2)
ANION GAP SERPL CALCULATED.3IONS-SCNC: 14 MMOL/L (ref 7–15)
BACTERIA BLD CULT: NO GROWTH
BUN SERPL-MCNC: 44.1 MG/DL (ref 6–20)
CALCIUM SERPL-MCNC: 9.5 MG/DL (ref 8.6–10)
CHLORIDE SERPL-SCNC: 96 MMOL/L (ref 98–107)
CREAT SERPL-MCNC: 3.97 MG/DL (ref 0.67–1.17)
DEPRECATED HCO3 PLAS-SCNC: 30 MMOL/L (ref 22–29)
ERYTHROCYTE [DISTWIDTH] IN BLOOD BY AUTOMATED COUNT: 21.2 % (ref 10–15)
GFR SERPL CREATININE-BSD FRML MDRD: 17 ML/MIN/1.73M2
GLUCOSE BLDC GLUCOMTR-MCNC: 104 MG/DL (ref 70–99)
GLUCOSE SERPL-MCNC: 120 MG/DL (ref 70–99)
HBV CORE AB SERPL QL IA: NONREACTIVE
HCT VFR BLD AUTO: 30.1 % (ref 40–53)
HGB BLD-MCNC: 8.4 G/DL (ref 13.3–17.7)
MAGNESIUM SERPL-MCNC: 1.9 MG/DL (ref 1.7–2.3)
MCH RBC QN AUTO: 28.9 PG (ref 26.5–33)
MCHC RBC AUTO-ENTMCNC: 27.9 G/DL (ref 31.5–36.5)
MCV RBC AUTO: 103 FL (ref 78–100)
PHOSPHATE SERPL-MCNC: 4.5 MG/DL (ref 2.5–4.5)
PLATELET # BLD AUTO: 165 10E3/UL (ref 150–450)
POTASSIUM SERPL-SCNC: 3.8 MMOL/L (ref 3.4–5.3)
RBC # BLD AUTO: 2.91 10E6/UL (ref 4.4–5.9)
SODIUM SERPL-SCNC: 140 MMOL/L (ref 136–145)
WBC # BLD AUTO: 5.7 10E3/UL (ref 4–11)

## 2023-02-06 PROCEDURE — 120N000001 HC R&B MED SURG/OB

## 2023-02-06 PROCEDURE — 83735 ASSAY OF MAGNESIUM: CPT | Performed by: STUDENT IN AN ORGANIZED HEALTH CARE EDUCATION/TRAINING PROGRAM

## 2023-02-06 PROCEDURE — 250N000013 HC RX MED GY IP 250 OP 250 PS 637: Performed by: INTERNAL MEDICINE

## 2023-02-06 PROCEDURE — 80069 RENAL FUNCTION PANEL: CPT | Performed by: INTERNAL MEDICINE

## 2023-02-06 PROCEDURE — 99233 SBSQ HOSP IP/OBS HIGH 50: CPT | Performed by: INTERNAL MEDICINE

## 2023-02-06 PROCEDURE — 86481 TB AG RESPONSE T-CELL SUSP: CPT | Performed by: INTERNAL MEDICINE

## 2023-02-06 PROCEDURE — 90937 HEMODIALYSIS REPEATED EVAL: CPT

## 2023-02-06 PROCEDURE — 250N000013 HC RX MED GY IP 250 OP 250 PS 637: Performed by: STUDENT IN AN ORGANIZED HEALTH CARE EDUCATION/TRAINING PROGRAM

## 2023-02-06 PROCEDURE — 250N000013 HC RX MED GY IP 250 OP 250 PS 637

## 2023-02-06 PROCEDURE — 85014 HEMATOCRIT: CPT | Performed by: INTERNAL MEDICINE

## 2023-02-06 PROCEDURE — 634N000001 HC RX 634: Performed by: INTERNAL MEDICINE

## 2023-02-06 PROCEDURE — 36415 COLL VENOUS BLD VENIPUNCTURE: CPT | Performed by: INTERNAL MEDICINE

## 2023-02-06 PROCEDURE — 90935 HEMODIALYSIS ONE EVALUATION: CPT | Performed by: STUDENT IN AN ORGANIZED HEALTH CARE EDUCATION/TRAINING PROGRAM

## 2023-02-06 PROCEDURE — 250N000012 HC RX MED GY IP 250 OP 636 PS 637: Performed by: STUDENT IN AN ORGANIZED HEALTH CARE EDUCATION/TRAINING PROGRAM

## 2023-02-06 PROCEDURE — 250N000009 HC RX 250: Performed by: STUDENT IN AN ORGANIZED HEALTH CARE EDUCATION/TRAINING PROGRAM

## 2023-02-06 PROCEDURE — 258N000003 HC RX IP 258 OP 636: Performed by: INTERNAL MEDICINE

## 2023-02-06 RX ADMIN — LACTULOSE 20 G: 10 SOLUTION ORAL at 21:20

## 2023-02-06 RX ADMIN — LACTULOSE 20 G: 10 SOLUTION ORAL at 08:17

## 2023-02-06 RX ADMIN — SODIUM CHLORIDE 300 ML: 9 INJECTION, SOLUTION INTRAVENOUS at 10:37

## 2023-02-06 RX ADMIN — LACOSAMIDE 100 MG: 10 SOLUTION ORAL at 21:20

## 2023-02-06 RX ADMIN — LACOSAMIDE 100 MG: 10 SOLUTION ORAL at 15:23

## 2023-02-06 RX ADMIN — TACROLIMUS 1 MG: 5 CAPSULE ORAL at 15:23

## 2023-02-06 RX ADMIN — Medication 40 MG: at 18:08

## 2023-02-06 RX ADMIN — NYSTATIN 500000 UNITS: 100000 SUSPENSION ORAL at 18:09

## 2023-02-06 RX ADMIN — Medication 2.5 MG: at 18:08

## 2023-02-06 RX ADMIN — APIXABAN 5 MG: 5 TABLET, FILM COATED ORAL at 20:21

## 2023-02-06 RX ADMIN — LEVETIRACETAM 1000 MG: 100 SOLUTION ORAL at 21:32

## 2023-02-06 RX ADMIN — SODIUM CHLORIDE 250 ML: 9 INJECTION, SOLUTION INTRAVENOUS at 10:38

## 2023-02-06 RX ADMIN — MIDODRINE HYDROCHLORIDE 10 MG: 5 TABLET ORAL at 13:46

## 2023-02-06 RX ADMIN — RIFAXIMIN 550 MG: 550 TABLET ORAL at 20:21

## 2023-02-06 RX ADMIN — APIXABAN 5 MG: 5 TABLET, FILM COATED ORAL at 08:17

## 2023-02-06 RX ADMIN — Medication 40 MG: at 08:17

## 2023-02-06 RX ADMIN — EPOETIN ALFA-EPBX 13000 UNITS: 10000 INJECTION, SOLUTION INTRAVENOUS; SUBCUTANEOUS at 10:26

## 2023-02-06 RX ADMIN — OLANZAPINE 5 MG: 5 TABLET, ORALLY DISINTEGRATING ORAL at 21:20

## 2023-02-06 RX ADMIN — TACROLIMUS 1 MG: 5 CAPSULE ORAL at 20:21

## 2023-02-06 RX ADMIN — SIMETHICONE 80 MG: 80 TABLET, CHEWABLE ORAL at 17:13

## 2023-02-06 RX ADMIN — LEVETIRACETAM 1000 MG: 100 SOLUTION ORAL at 00:48

## 2023-02-06 RX ADMIN — ACETAMINOPHEN 650 MG: 325 TABLET, FILM COATED ORAL at 15:46

## 2023-02-06 RX ADMIN — FOLIC ACID 1 MG: 1 TABLET ORAL at 08:17

## 2023-02-06 RX ADMIN — MIDODRINE HYDROCHLORIDE 10 MG: 5 TABLET ORAL at 18:08

## 2023-02-06 RX ADMIN — RIFAXIMIN 550 MG: 550 TABLET ORAL at 08:17

## 2023-02-06 RX ADMIN — MIDODRINE HYDROCHLORIDE 10 MG: 5 TABLET ORAL at 08:17

## 2023-02-06 RX ADMIN — LACTULOSE 20 G: 10 SOLUTION ORAL at 15:27

## 2023-02-06 ASSESSMENT — ACTIVITIES OF DAILY LIVING (ADL)
ADLS_ACUITY_SCORE: 63
ADLS_ACUITY_SCORE: 69
ADLS_ACUITY_SCORE: 63
ADLS_ACUITY_SCORE: 63
ADLS_ACUITY_SCORE: 69
ADLS_ACUITY_SCORE: 63
ADLS_ACUITY_SCORE: 69
ADLS_ACUITY_SCORE: 63
ADLS_ACUITY_SCORE: 63
ADLS_ACUITY_SCORE: 69

## 2023-02-06 NOTE — PROGRESS NOTES
AxO to self only. Pt able to follow some commands and directions and can answer some questions, but thorough assessment is difficult. Pt has bedside attendant; pt very restless throughout the evening (). VSS on RA. LS are clear and diminished, breathing pattern shallow. BS audible and active. R forearm PIV SL, CVC heparin locked. Scattered abrasions and bruising, blanchable sacral wound. Pt does not produce urine, rectal tube in place; leaky small to moderate amount. Soft, bite size and moderately thick liquid diet, poor PO intake. Pt didn't eat sufficient amount of dinner, bolused tube feeding per order (240mL over 3h). Pt lift assist for transfer. Pt receives dialysis MWF.

## 2023-02-06 NOTE — PLAN OF CARE
Goal Outcome Evaluation:           Overall Patient Progress: improvingOverall Patient Progress: improving    Outcome Evaluation: Ongoing variable intakes, ate 50% of 1 meal daily over the past 3 days otherwise just taking bites of food. Intermittently accepting bolus TF. Continue to recomend considering change to nocturnal TF to give patient more nutrition with high repletion needs.    Sirena Gomez RD, LD

## 2023-02-06 NOTE — PLAN OF CARE
Goal Outcome Evaluation:  : Disoriented to place, time & situations. Reoriented. Restless x1, easily redirectable, cooperative. VSS on RA. Incontinent. Rectal tube in place with loose yellow stool output. Turned/repositioned q2hrs. Denies pain.Had mitts on  Bed side attendant, now on long arm monitoring. Plans for dialysis today.

## 2023-02-06 NOTE — PROGRESS NOTES
[facility]    Medicine Progress Note - Hospitalist Service       Date of Admission:  1/21/2023  Assessment & Plan   Blanco Osborne is a 57 year-old male admitted on 1/21/2023 as a transfer from [facility] for evaluation of loculated pleural effusion. Patient has multiple medical problems including history of liver transplant 2016 due to alcohol abuse, pAF on chronic anticoagulation, history of diabetes mellitus type 2, CVA, hypertension, CHRISTIAN on BiPAP. 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  He was recovering in [facility] and was transferred to [facility] for consideration of chest tube placement and possible intrapleural lysis for worsening effusion      Acute metabolic encephalopathy   *Per wife at bedside patient has had waxing and waning mentation, coinciding with lactulose being held given at [facility].   *Patient continues to be confused here, and has pulled out tracheostomy tube, PICC line and pulled his dialysis catheter  - Mental status continues to wax and wane, much more alert 2/6, but with some paranoid thoughts about his medications and had been refusing medications, tube feeds  - Discussed at length with wife at bedside and brother assisting via phone. At this point his thought process is not organized regarding his refusal of feeds/medications (repeats he wants to live, but not acknowledging refusal of nutrition and essential medications directly contradicts that) enough to demonstrate capacity to make his own decisions. Attempting to continue to encourage him to take his medications and resume tube feeds, however if he continues to refuse, his wife would be his surrogate decision maker and discussed utilizing restraints if needed to facilitate healing interventions  - Re-orient as needed  - Maintain  normal day/night, sleep wake cycles  - Minimize sedating/altering medications as able  - Treat separate conditions as detailed above/below   - Continue lactulose TID  - Olanzapine BID PRN  - Restraints, sitter as needed     Bilateral pleural effusions   Acute now chronic respiratory failure requiring tracheostomy  - resolved    Pneumonia  - resolved   ARDS  - resolved    Right pneumothorax - resolved   *Initial event was parainfluenza and strep pneumo pneumonia back in November 2022. Treated for ARDS. Had failed extubation x2 and tracheostomy performed on previous hospitalization. *Had additional complications of bilateral pneumothoraces as well as hydropneumothorax- pleural fluid grew candida parapsilosis  *Has already completed 4-week antifungal treatment. [facility].  *Recently there were concern for worsening effusion while at [facility] and had thoracentesis 01/13 which returned exudative without growth on cultures. CT chest/abdomen/pelvis on 01/18 demonstrated worsening effusions and concerns for infected parapneumonic effusions with possible SBP.  Multiple pulmonologist at [facility] reviewed CT scan recommended transfer to [facility] for consideration of chest tube placement and possible intrapleural lysis  *Thoracic surgery (Dr. Jackson) consulted during admission   *CT chest 1/22, small effusions on imaging and so chest tube was not inserted.   *Patient pulled out his tracheostomy tube on the night 2/1-2/2 and is tolerating well without increased shortness of breath persistent cough or worsening hypoxia  - Respiratory status stable on room air  - Wound care consult for tracheostomy site care      Goals of care   As per report on 1/25 nursing had mentioned that patient had refused to undergo dialysis and want to stop care, palliative care was consulted.  Patient and his spouse denied wanting to stop care and wanted ongoing restorative care including full  code     ESRD  Now on HD.  No return of renal function.  - Nephrology following, continues on M-W-F schedule      S/p Liver transplant 2016   Cirrhosis with ascites  Subacute bacterial peritonitis  *Main manifestations of this are hepatic encephalopathy and ascites.  Hepatologist at [facility] felt that most likely reason for the cirrhosis was metabolic syndrome or alcohol intake.  There was no evidence of rejection on biopsy and there was some evidence of regeneration. Paracentesis done on 12/22/2022 showed lactobacillus indicating SBP, treated with Unasyn and Augmentin, finished 2-week course 1/12/2023.  Requires large-volume paracentesis periodically for recurring ascites.    *Paracentesis 1/13/2023 yielded about 7500 cc. Cultures NGTD.  Most recent paracentesis on 1/24 with 4.8 L fluid removal  - Continue intermittent paracentesis as needed if develops symptomatic ascites.    - Further treatment for recurrent ascites with TIPS deferred to his Liver Transplant team and/or Hepatology, he has an appointment on 4/21/2023 with Hepatology, Dr. Simms  - Continue Tacrolimus 1 mg BID.  - Continue rifaximin   - Lactulose increased as above      Diarrhea  C difficile negative. Secondary to lactulose.  - Continue rectal tube     Paroxysmal atrial fibrillation  Remains in sinus rhythm.  On anticoagulation with apixaban indefinitely for stroke prophylaxis.       Acute anemia  Pt has required intermittent transfusions for on-going anemia.   - Anemia most likely secondary to critical illness/chronic disease, chronic renal disease  - Hemoglobin stable, 8.4 g/dl 2/6  - Transfuse packed red blood cells to keep Hb> 7     Dysphonia, persistent  - ENT consulted per SLP recommendations (non-urgent)     History PEA arrest   PEA arrest prior to his previous admission and 1 episode of PEA associated with desaturation on 12/20.  No structural cardiac disease.   - Continue Cardiac Monitoring     Hypotension  Also has developed baseline  hypotension 2/2 cirrhosis and prolonged critical illness.   - Continue midodrine       Seizure after hypoxic event.  This is in the context of baseline multifactorial encephalopathy secondary to his ongoing critical illness and prior cardiac arrests and prior strokes and cirrhosis with hepatic encephalopathy. MRI of head of 12/20/22 reveals no PRES or leptomeningeal enhancement but scattered.  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   - Continue levetiracetam, lacosamide       Diabetes mellitus type 2  *Hemoglobin A1c on November 2022 was 4.7  *PTA looks like was on 30 units of Lantus every morning, currently only on MDSSI  - Off blood sugar checks and sliding scale insulin as has been stable without insulin needs     Moderate malnutrition, protein and calorie type.  - Continue with tube feeds via PEG tube  - Nutritionist consulted and following     Anxiety  Fluoxetine was discontinued as per psychiatry recommendation on 2/2 (see consult note for details).       Clinically Significant Risk Factors           # Hypercalcemia: corrected calcium is >10.1, will monitor as appropriate    # Hypoalbuminemia: Lowest albumin = 1.9 g/dL at 1/23/2023  6:38 AM, will monitor as appropriate            # Severe Malnutrition: based on nutrition assessment           Diet: Soft & Bite Sized Diet (level 6) Liquidized/Moderately Thick (level 3)  Room Service  Adult Formula Bolus Feeding: Novasource Renal; Route: Gastrostomy; 3 times daily; Volume per Bolus: 240; mL(s); 80 mL/hr x 3 hrs back up bolus feeding after each meal ONLY IF INTAKE <50% of meal  Snacks/Supplements Adult: Other; 4 oz Ensure with breakfast, Gelatein+ with lunch and magic cup with dinner (RD); With Meals    DVT Prophylaxis: Pneumatic Compression Devices  Nixon Catheter: Not present  Code Status: Full Code      Disposition Plan      Expected Discharge Date: 02/09/2023,  3:00 PM    Destination: inpatient  rehabilitation facility  Discharge Comments: 1/25 Palliative consult. [facility] won't take pt.  1/27 placement, no more TF, on dialysis and not able to sit up  [facility]     Entered: Brenden Armando MD 02/06/2023, 1:34 PM       The patient's care was discussed with the patient, patient's wife, patient's brother, bedside RN    Brenden Armando MD  Hospitalist Service  [facility]    ______________________________________________________________________    Interval History   No significant episodes of agitation noted overnight.  Patient underwent dialysis today.  In the afternoon, he refused medications and tube feeds.  He reports he wants to live, but is skeptical about the role medications are playing in this and is concerned about medication interactions, paranoid that his care team is trying to harm him with them.     Data reviewed today: I reviewed all medications, new labs and imaging results over the last 24 hours. I personally reviewed no images or EKG's today.    Physical Exam   Vital Signs: Temp: 98  F (36.7  C) Temp src: Oral BP: 106/75 Pulse: 98   Resp: 18 SpO2: 95 % O2 Device: None (Room air)    Weight: 186 lbs 4.62 oz    Constitutional: Chronically ill appearing, NAD  Respiratory: Normal respiratory effort at rest   Cardiovascular: RRR.  GI:   Skin/Integumen: Warm, dry  Neuro/psych:  Alert. Oriented to self, location. Declined to answer date. Paranoid thoughts.     Data   Recent Labs   Lab 02/06/23  0647 02/06/23  0600 02/05/23  1203 01/31/23  2123 01/31/23  1527   WBC  --  5.7  --   --  11.5*   HGB  --  8.4*  --   --  7.7*   MCV  --  103*  --   --  103*   PLT  --  165  --   --  164   NA  --  140  --   --   --    POTASSIUM  --  3.8  --   --   --    CHLORIDE  --  96*  --   --   --    CO2  --  30*  --   --   --    BUN  --  44.1*  --   --   --    CR  --  3.97*  --   --   --    ANIONGAP  --  14  --   --   --    KELLY  --  9.5  --   --   --    * 120* 131*    < >  --    ALBUMIN  --  2.2*  --   --   --     < > = values in this interval not displayed.       No results found for this or any previous visit (from the past 24 hour(s)).  Medications       - MEDICATION INSTRUCTIONS for Dialysis Patients -   Does not apply See Admin Instructions     apixaban ANTICOAGULANT  5 mg Oral BID     folic acid  1 mg Oral or Feeding Tube Daily     lacosamide  100 mg Oral or Feeding Tube BID     lactulose  20 g Oral TID     levETIRAcetam  1,000 mg Oral or Feeding Tube Q24H     lidocaine  1 patch Transdermal Q24H     lidocaine   Transdermal Q8H BIJU     midodrine  10 mg Oral or Feeding Tube TID w/meals     mineral oil-hydrophilic petrolatum   Topical Daily     - MEDICATION INSTRUCTIONS -   Does not apply Once     nystatin  500,000 Units Swish & Spit 4x Daily     pantoprazole  40 mg Oral or Feeding Tube BID AC     [Held by provider] QUEtiapine  25 mg Oral At Bedtime     rifaximin  550 mg Oral or Feeding Tube BID     sodium chloride (PF)  10-30 mL Intracatheter Q8H     sodium chloride (PF)  3 mL Intracatheter Q8H     tacrolimus  1 mg Oral BID IS

## 2023-02-06 NOTE — PROGRESS NOTES
Renal Medicine Progress Note            Assessment/Plan:     Assessment:   1) End Stage Kidney Disease (CKD and subsequent ATN with non-recovery)   Initially LORETTA, but no renal recovery (anuric/oliguric), now considered ESRD. 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 On midodrine 10mg TID.     2) Anemia,  persistent anemia despite adequate iron stores and high epo dosing suggesting some epo resistance.      3) disposition:   not returning to [facility]. In case not able to go to inpatient rehab, would consider [facility] for TCU/rehab and dialysis. Checking hep B antigen, tristan and core tristan along with quant-gold for potential admission to outpatinet dialysis unit. These will be good for 30 days.      Other:  Hx liver transplant 2016  Confusion/encephlopathy  DM  AMS, delerium     Plan/Recs:  1) HD Wednesday  2) Epo 13,000 units with HD   3) weekly labs ordered for AM to help adjust potassium bath on dialysis, epo dosing, etc.  4) suggest [facility] as TCU if not able to go to inpatient rehab          Interval History:     - seen during dialysis   - reports some buttock discomfort, asking to be repositioned, but otherwise denies complaints   - denies SOB   - able to talk some, limited by hole in trach   -tolerating dialysis well, no issues   - wife seen in hallway, updated          Medications and Allergies:       - MEDICATION INSTRUCTIONS for Dialysis Patients -   Does not apply See Admin Instructions     apixaban ANTICOAGULANT  5 mg Oral BID     folic acid  1 mg Oral or Feeding Tube Daily     lacosamide  100 mg Oral or Feeding Tube BID     lactulose  20 g Oral TID     levETIRAcetam  1,000 mg Oral or Feeding Tube Q24H     lidocaine  1 patch Transdermal Q24H     lidocaine   Transdermal Q8H BIJU     midodrine  10 mg Oral or Feeding Tube TID w/meals     mineral oil-hydrophilic petrolatum   Topical Daily     - MEDICATION  INSTRUCTIONS -   Does not apply Once     nystatin  500,000 Units Swish & Spit 4x Daily     pantoprazole  40 mg Oral or Feeding Tube BID AC     [Held by provider] QUEtiapine  25 mg Oral At Bedtime     rifaximin  550 mg Oral or Feeding Tube BID     sodium chloride (PF)  10-30 mL Intracatheter Q8H     sodium chloride (PF)  3 mL Intracatheter Q8H     tacrolimus  1 mg Oral BID IS      No Known Allergies         Physical Exam:   Vitals were reviewed  BP 98/68   Pulse 98   Temp 98  F (36.7  C) (Axillary)   Resp 18   Wt 84.5 kg (186 lb 4.6 oz)   SpO2 95%   BMI 25.27 kg/m      Wt Readings from Last 3 Encounters:   01/31/23 84.5 kg (186 lb 4.6 oz)   01/21/23 82.4 kg (181 lb 11.2 oz)   12/28/22 96 kg (211 lb 10.3 oz)       Intake/Output Summary (Last 24 hours) at 2/5/2023 1043  Last data filed at 2/5/2023 1000  Gross per 24 hour   Intake 1340 ml   Output --   Net 1340 ml       GENERAL APPEARANCE: frail, weak appearing  HEENT:  unremarkable, trach site covered  CV: RRR, nl S1/S2  EXTREMITIES/SKIN: no c/c/rashes/lesions; no edema  LINES:  Right tunneled dialysis catheter present, no visible abnormalities.           Data:     BMP  Recent Labs   Lab 02/06/23  0647 02/06/23  0600 02/05/23  1203 02/05/23  0816   NA  --  140  --   --    POTASSIUM  --  3.8  --   --    CHLORIDE  --  96*  --   --    KELLY  --  9.5  --   --    CO2  --  30*  --   --    BUN  --  44.1*  --   --    CR  --  3.97*  --   --    * 120* 131* 99     CBC  Recent Labs   Lab 02/06/23  0600 01/31/23  1527   WBC 5.7 11.5*   HGB 8.4* 7.7*   HCT 30.1* 26.7*   * 103*    164     Lab Results   Component Value Date    AST 19 01/28/2023    ALT 5 (L) 01/28/2023    ALKPHOS 235 (H) 01/28/2023    BILITOTAL 0.6 01/28/2023    CHANDLER 44 01/31/2023     Lab Results   Component Value Date    INR 1.36 (H) 12/21/2022       Attestation:  I have reviewed today's vital signs, notes, medications, labs and imaging.    Dwayne Laboy MD  [facility] -  Nephrology  Office: [phone]

## 2023-02-06 NOTE — PROGRESS NOTES
CLINICAL NUTRITION SERVICES - REASSESSMENT NOTE      Future/Additional Recommendations:   Diet per SLP  Continue supplements as ordered  Continue TF as accepted by patient/family   Continue to recommend considering change to nocturnal regimen to give patient more nutrition with high repletion needs    Malnutrition: (1/22)  % Weight Loss:  > 5% in 1 month (severe malnutrition)  % Intake:  </= 50% for >/= 5 days (severe malnutrition)- suspected   Subcutaneous Fat Loss:  Orbital region moderate depletion, Upper arm region moderate-severe depletion and Thoracic region moderate-severe depletion  Muscle Loss:  Temporal region moderate depletion, Clavicle bone region severe depletion, Acromion bone region severe depletion, Patellar region moderate depletion and Anterior thigh region moderate-severe depletion  Fluid Retention:  Trace     Malnutrition Diagnosis: Severe malnutrition  In Context of:  Acute on Chronic illness or disease       EVALUATION OF PROGRESS TOWARD GOALS   Diet:  Soft & Bite Sized Diet + Moderately Thick Liquids per SLP recommendations   Room Service w/ Assist     Supplements:   Breakfast - 4 oz Ensure  Lunch - Gelatein+  Dinner - Magic Cup    Nutrition Support - Enteral:    Type of Feeding Tube: G-tube  Enteral Frequency:  Continuous  Enteral Regimen: Novasource Renal at 80 mL/hr x 3 hours TID --> IF consumes < 50% of meal  Total Enteral Provisions: 240 mL formula TID or 720 mL/day = 1440 kcal, 66 g protein, 132 g CHO, 0 g fiber and 516 mL free water (meeting approx 58% estimated energy needs and 66% estimated protein needs)    Free Water Flush: 100 mL before and after each feeding    Intake/Tolerance:    TF -- Received bolus x 1 (240 mL) on 2/5, bolus x 2 (480 mL) on 2/4 and bolus x 1 (240 mL) on 2/3  On average over past 3 days patient has received 320 mL/day providing 640 kcal (26% needs) and 29 g PRO (29% needs).     Oral -- Ongoing poor intakes, taking bites of most meals. Did eat 50% x 1 meal each  over past 3 days. Patient is a total feed, family assisting often.     Labs reviewed - Lytes WNL, BUN 44 (H), Cr 3.97 (H). Iron low on 1/30.   Recent Labs   Lab 02/06/23  0647 02/06/23  0600 02/05/23  1203 02/05/23  0816 02/04/23  2236 02/04/23  1746   * 120* 131* 99 136* 152*     Meds - folic acid 1 mg/day, lactulose TID  Stool -  2/6: 200 mL this morning   2/5: 400 mL  2/4: x 1  2/3: x 3    Weight - Fluctuations likely with fluid status 2/2 paracentesis and dialysis but do suspect some true weight loss as well given poor nutritional status. No new weight over the past week.   Wt Readings from Last 10 Encounters:   01/31/23 84.5 kg (186 lb 4.6 oz)   01/21/23 82.4 kg (181 lb 11.2 oz)   12/28/22 96 kg (211 lb 10.3 oz)   12/16/22 100.2 kg (221 lb)   12/16/22 100.2 kg (221 lb)   12/15/22 87 kg (191 lb 12.8 oz)   10/07/19 137.5 kg (303 lb 3.2 oz)   10/04/19 131.5 kg (290 lb)   02/20/19 140.4 kg (309 lb 9.6 oz)   07/10/18 137.5 kg (303 lb 3.2 oz)         ASSESSED NUTRITION NEEDS:  Dosing Weight 82.4 kg   Estimated Energy Needs: 1990-5593 kcals (30-35 Kcal/Kg)  Justification: repletion and HD  Estimated Protein Needs: + grams protein (1.2-1.5+ g pro/Kg)  Justification: Repletion and dialysis  Estimated Fluid Needs: per provider pending fluid status        NEW FINDINGS:   Continues on dialysis    Family wondering how much protein patient should be eating/day -- attempted to connect with them x 3 today at both bedside and dialysis but they were not available. Left handout in room for room service menu with protein content. Wrote down calorie and protein goals along with nutrition content of supplements patient is receiving daily. Will attempt to connect with them on this as able.     1/30:  WOCN =   - Superior coccyx and Left coccyx - PI stage 2: healed 1/30   - Midline low back - Pt has purpura all over body and appears skin was torn open   Status: Initial assessment    Previous Goals:   EN + PO intake to meet  % nutrition needs   Evaluation: Not met    Previous Nutrition Diagnosis:   Inadequate protein-energy intake related to decreased appetite with early satiety/bloating/nausea and refusal of TF as evidenced by PO + EN intake meeting < 50% nutrition needs for the ~2 weeks   Evaluation: Improving but ongoing       CURRENT NUTRITION DIAGNOSIS  Inadequate protein-energy intake related to decreased appetite with early satiety/bloating/nausea and intermittent refusal of TF as evidenced by PO + EN intake meeting </=  50% nutrition needs for the past ~3 weeks     INTERVENTIONS  Recommendations / Nutrition Prescription  Diet per SLP  Continue supplements as ordered  Continue TF as accepted by patient/family   Continue to recommend considering change to nocturnal regimen to give patient more nutrition with high repletion needs     Provided patient/family handout on room service menu protein content with associate calorie/protein goals for patient.     Implementation  Nutrition Education - handout provided with nutrition goals     Goals  EN + PO intake to meet % nutrition needs       MONITORING AND EVALUATION:  Progress towards goals will be monitored and evaluated per protocol and Practice Guidelines      Sirena Gomez RD, LD

## 2023-02-06 NOTE — PLAN OF CARE
SLP - Unable to complete a VFSS or swallow Tx this pm due to pt agitation/inability to participate.  Plan to check pt status at bedside.  Will reschedule VFSS as appropriate.

## 2023-02-07 ENCOUNTER — APPOINTMENT (OUTPATIENT)
Dept: SPEECH THERAPY | Facility: CLINIC | Age: 58
DRG: 981 | End: 2023-02-07
Attending: STUDENT IN AN ORGANIZED HEALTH CARE EDUCATION/TRAINING PROGRAM
Payer: COMMERCIAL

## 2023-02-07 ENCOUNTER — APPOINTMENT (OUTPATIENT)
Dept: PHYSICAL THERAPY | Facility: CLINIC | Age: 58
DRG: 981 | End: 2023-02-07
Attending: STUDENT IN AN ORGANIZED HEALTH CARE EDUCATION/TRAINING PROGRAM
Payer: COMMERCIAL

## 2023-02-07 ENCOUNTER — APPOINTMENT (OUTPATIENT)
Dept: GENERAL RADIOLOGY | Facility: CLINIC | Age: 58
DRG: 981 | End: 2023-02-07
Attending: INTERNAL MEDICINE
Payer: COMMERCIAL

## 2023-02-07 LAB
GAMMA INTERFERON BACKGROUND BLD IA-ACNC: 0.08 IU/ML
M TB IFN-G BLD-IMP: NEGATIVE
M TB IFN-G CD4+ BCKGRND COR BLD-ACNC: 6.33 IU/ML
MITOGEN IGNF BCKGRD COR BLD-ACNC: 0 IU/ML
MITOGEN IGNF BCKGRD COR BLD-ACNC: 0 IU/ML
QUANTIFERON MITOGEN: 6.41 IU/ML
QUANTIFERON NIL TUBE: 0.08 IU/ML
QUANTIFERON TB1 TUBE: 0.08 IU/ML
QUANTIFERON TB2 TUBE: 0.08

## 2023-02-07 PROCEDURE — 99232 SBSQ HOSP IP/OBS MODERATE 35: CPT | Performed by: INTERNAL MEDICINE

## 2023-02-07 PROCEDURE — 92526 ORAL FUNCTION THERAPY: CPT | Mod: GN | Performed by: SPEECH-LANGUAGE PATHOLOGIST

## 2023-02-07 PROCEDURE — 97530 THERAPEUTIC ACTIVITIES: CPT | Mod: GP

## 2023-02-07 PROCEDURE — 250N000013 HC RX MED GY IP 250 OP 250 PS 637: Performed by: INTERNAL MEDICINE

## 2023-02-07 PROCEDURE — 74230 X-RAY XM SWLNG FUNCJ C+: CPT

## 2023-02-07 PROCEDURE — 250N000012 HC RX MED GY IP 250 OP 636 PS 637: Performed by: STUDENT IN AN ORGANIZED HEALTH CARE EDUCATION/TRAINING PROGRAM

## 2023-02-07 PROCEDURE — 250N000013 HC RX MED GY IP 250 OP 250 PS 637: Performed by: STUDENT IN AN ORGANIZED HEALTH CARE EDUCATION/TRAINING PROGRAM

## 2023-02-07 PROCEDURE — 120N000001 HC R&B MED SURG/OB

## 2023-02-07 PROCEDURE — 250N000009 HC RX 250: Performed by: STUDENT IN AN ORGANIZED HEALTH CARE EDUCATION/TRAINING PROGRAM

## 2023-02-07 PROCEDURE — 92611 MOTION FLUOROSCOPY/SWALLOW: CPT | Mod: GN | Performed by: SPEECH-LANGUAGE PATHOLOGIST

## 2023-02-07 PROCEDURE — 99231 SBSQ HOSP IP/OBS SF/LOW 25: CPT | Performed by: STUDENT IN AN ORGANIZED HEALTH CARE EDUCATION/TRAINING PROGRAM

## 2023-02-07 PROCEDURE — G0463 HOSPITAL OUTPT CLINIC VISIT: HCPCS

## 2023-02-07 PROCEDURE — 250N000013 HC RX MED GY IP 250 OP 250 PS 637

## 2023-02-07 RX ORDER — ACETAMINOPHEN 650 MG/1
650 SUPPOSITORY RECTAL EVERY 4 HOURS PRN
Status: DISCONTINUED | OUTPATIENT
Start: 2023-02-07 | End: 2023-04-28 | Stop reason: HOSPADM

## 2023-02-07 RX ORDER — ACETAMINOPHEN 325 MG/1
325-650 TABLET ORAL EVERY 4 HOURS PRN
Status: DISCONTINUED | OUTPATIENT
Start: 2023-02-07 | End: 2023-04-28 | Stop reason: HOSPADM

## 2023-02-07 RX ORDER — BARIUM SULFATE 400 MG/ML
SUSPENSION ORAL ONCE
Status: COMPLETED | OUTPATIENT
Start: 2023-02-07 | End: 2023-02-07

## 2023-02-07 RX ORDER — UREA 8.5 G/85G
CREAM TOPICAL 2 TIMES DAILY PRN
Status: DISCONTINUED | OUTPATIENT
Start: 2023-02-07 | End: 2023-02-08

## 2023-02-07 RX ADMIN — RIFAXIMIN 550 MG: 550 TABLET ORAL at 08:41

## 2023-02-07 RX ADMIN — BARIUM SULFATE 15 ML: 400 SUSPENSION ORAL at 14:23

## 2023-02-07 RX ADMIN — NYSTATIN 500000 UNITS: 100000 SUSPENSION ORAL at 08:40

## 2023-02-07 RX ADMIN — RIFAXIMIN 550 MG: 550 TABLET ORAL at 20:33

## 2023-02-07 RX ADMIN — MIDODRINE HYDROCHLORIDE 10 MG: 5 TABLET ORAL at 12:08

## 2023-02-07 RX ADMIN — BARIUM SULFATE 10 ML: 400 SUSPENSION ORAL at 14:24

## 2023-02-07 RX ADMIN — Medication 40 MG: at 15:48

## 2023-02-07 RX ADMIN — LACTULOSE 20 G: 10 SOLUTION ORAL at 21:04

## 2023-02-07 RX ADMIN — MIDODRINE HYDROCHLORIDE 10 MG: 5 TABLET ORAL at 08:41

## 2023-02-07 RX ADMIN — APIXABAN 5 MG: 5 TABLET, FILM COATED ORAL at 08:41

## 2023-02-07 RX ADMIN — NYSTATIN 500000 UNITS: 100000 SUSPENSION ORAL at 12:08

## 2023-02-07 RX ADMIN — TACROLIMUS 1 MG: 5 CAPSULE ORAL at 08:54

## 2023-02-07 RX ADMIN — TACROLIMUS 1 MG: 5 CAPSULE ORAL at 20:34

## 2023-02-07 RX ADMIN — FOLIC ACID 1 MG: 1 TABLET ORAL at 08:41

## 2023-02-07 RX ADMIN — SIMETHICONE 80 MG: 80 TABLET, CHEWABLE ORAL at 12:59

## 2023-02-07 RX ADMIN — WHITE PETROLATUM: 1.75 OINTMENT TOPICAL at 08:53

## 2023-02-07 RX ADMIN — LACOSAMIDE 100 MG: 10 SOLUTION ORAL at 20:34

## 2023-02-07 RX ADMIN — LACTULOSE 20 G: 10 SOLUTION ORAL at 08:40

## 2023-02-07 RX ADMIN — LACTULOSE 20 G: 10 SOLUTION ORAL at 15:48

## 2023-02-07 RX ADMIN — MIDODRINE HYDROCHLORIDE 10 MG: 5 TABLET ORAL at 17:53

## 2023-02-07 RX ADMIN — LACOSAMIDE 100 MG: 10 SOLUTION ORAL at 08:41

## 2023-02-07 RX ADMIN — APIXABAN 5 MG: 5 TABLET, FILM COATED ORAL at 20:33

## 2023-02-07 RX ADMIN — ACETAMINOPHEN 650 MG: 325 TABLET, FILM COATED ORAL at 17:55

## 2023-02-07 RX ADMIN — Medication 40 MG: at 08:41

## 2023-02-07 RX ADMIN — LEVETIRACETAM 1000 MG: 100 SOLUTION ORAL at 22:45

## 2023-02-07 ASSESSMENT — ACTIVITIES OF DAILY LIVING (ADL)
ADLS_ACUITY_SCORE: 69

## 2023-02-07 NOTE — PROGRESS NOTES
Renal Medicine Progress Note            Assessment/Plan:     Assessment:   1) End Stage Kidney Disease (CKD and subsequent ATN with non-recovery)   Initially [place]ORETTA, but no renal recovery (anuric/oliguric), now considered ESRD. 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 On midodrine 10mg TID.     2) Anemia,  persistent anemia despite adequate iron stores and high epo dosing suggesting some epo resistance.      3) disposition:   not returning to [facility]. In case not able to go to inpatient rehab, would consider [facility] for TCU/rehab and dialysis. Checking hep B antigen, tristan and core tristan along with quant-gold for potential admission to outpatinet dialysis unit. These will be good for 30 days.     4) Diffuse pruritis  No hyperphosphatemia, though pruritis might be related to his renal disease. Ordered urea cream to trial.      Other:  Hx liver transplant 2016  Confusion/encephlopathy  DM  AMS, delerium     Plan/Recs:  1) HD Wednesday  2) Epo 13,000 units with HD   3) weekly labs ordered for AM to help adjust potassium bath on dialysis, epo dosing, etc.  4) suggest [place] as TCU if not able to go to inpatient rehab  5) Urea cream BID for pruritis, can stop if not effective          Interval History:     - some agitation and paranoia overnight   - this AM doing ok, wife updated at bedside   - his main complaint is all over itching.   - he denies SOB   - denies n/v  - dialysis yesterday was uneventful, 1L UF          Medications and Allergies:       - MEDICATION INSTRUCTIONS for Dialysis Patients -   Does not apply See Admin Instructions     apixaban ANTICOAGULANT  5 mg Oral BID     folic acid  1 mg Oral or Feeding Tube Daily     lacosamide  100 mg Oral or Feeding Tube BID     lactulose  20 g Oral TID     levETIRAcetam  1,000 mg Oral or Feeding Tube Q24H     lidocaine  1 patch Transdermal Q24H     lidocaine   Transdermal  Q8H [facility]     midodrine  10 mg Oral or Feeding Tube TID w/meals     mineral oil-hydrophilic petrolatum   Topical Daily     nystatin  500,000 Units Swish & Spit 4x Daily     pantoprazole  40 mg Oral or Feeding Tube BID AC     [Held by provider] QUEtiapine  25 mg Oral At Bedtime     rifaximin  550 mg Oral or Feeding Tube BID     sodium chloride (PF)  10-30 mL Intracatheter Q8H     sodium chloride (PF)  3 mL Intracatheter Q8H     tacrolimus  1 mg Oral BID IS      No Known Allergies         Physical Exam:   Vitals were reviewed  /86 (BP Location: Right arm)   Pulse 93   Temp 98.3  F (36.8  C) (Oral)   Resp 22   Wt 84.5 kg (186 lb 4.6 oz)   SpO2 91%   BMI 25.27 kg/m      Wt Readings from Last 3 Encounters:   01/31/23 84.5 kg (186 lb 4.6 oz)   01/21/23 82.4 kg (181 lb 11.2 oz)   12/28/22 96 kg (211 lb 10.3 oz)       Intake/Output Summary (Last 24 hours) at 2/5/2023 1043  Last data filed at 2/5/2023 1000  Gross per 24 hour   Intake 1340 ml   Output --   Net 1340 ml       GENERAL APPEARANCE: frail, weak appearing  HEENT:  unremarkable, trach site covered  CV: RRR, nl S1/S2  EXTREMITIES/SKIN: no c/c/rashes/lesions; no edema  LINES:  Right tunneled dialysis catheter present, no visible abnormalities.           Data:     BMP  Recent Labs   Lab 02/06/23  0647 02/06/23  0600 02/05/23  1203 02/05/23  0816   NA  --  140  --   --    POTASSIUM  --  3.8  --   --    CHLORIDE  --  96*  --   --    KELLY  --  9.5  --   --    CO2  --  30*  --   --    BUN  --  44.1*  --   --    CR  --  3.97*  --   --    * 120* 131* 99     CBC  Recent Labs   Lab 02/06/23  0600 01/31/23  1527   WBC 5.7 11.5*   HGB 8.4* 7.7*   HCT 30.1* 26.7*   * 103*    164     Lab Results   Component Value Date    AST 19 01/28/2023    ALT 5 (L) 01/28/2023    ALKPHOS 235 (H) 01/28/2023    BILITOTAL 0.6 01/28/2023    CHANDLER 44 01/31/2023     Lab Results   Component Value Date    INR 1.36 (H) 12/21/2022       Attestation:  I have reviewed today's  vital signs, notes, medications, labs and imaging.    Dwayne Laboy MD  [facility] - Nephrology  Office: [phone]

## 2023-02-07 NOTE — PLAN OF CARE
Goal Outcome Evaluation:    Orientations: AOxself  Vitals/Pain: VSS, RA, pain controlled with PRN tylenol  Lines/Drains: PIV SL, PEG tube infusing tube feed 80mL/hr for intermittent feedings  Skin/Wounds: Scattered bruising/scabs, blanchable redness near sacrum/coccyx, barrier cream applied   GI/: Rectal tube in place, no urine output due to hemodialysis   Labs: Abnormal/Trends, Electrolyte Replacement- NA  Ambulation/Assist: Assist of 2 lift, up to the chair x3  Plan: Pt was calm during shift with no paranoia thoughts, no mitts on during day, video swallow study done today, dialysis tomorrow

## 2023-02-07 NOTE — PROGRESS NOTES
"  SLP VFSS Report 02/07/23 4289   Appointment Info   Signing Clinician's Name / Credentials (SLP) Jie Marcus MA [facility] SLP   General Information   Onset of Illness/Injury or Date of Surgery 12/16/22   Referring Physician Rylie Jenkins MD   Patient/Family Therapy Goal Statement (SLP) Overall POC wishes to be clarified.  Pt agreed to swallow POC goals at this time.   Pertinent History of Current Problem Per provider note: \"Blanco Osborne is a 57 year old male admitted on 1/21/2023 as a transfer from [facility] for evaluation of loculated pleural effusion. Patient has multiple medical problems including history of liver transplant 2016 due to alcohol abuse, pAF on chronic anticoagulation, history of DM2, CVA, HTN, CHRISTIAN on BiPAP. 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 Discharge to [facility], back to [facility] and back to [facility] during month of December. Recently while at [facility] there were concerns for worsening effusions. He had thoracentesis 01/13 which returned exudative without growth on cultures. CT chest/abdomen/pelvis on 01/18 demonstrated worsening effusions and concerns for infected parapneumonic effusions with possible SBP.  CT findings discussed with Pulmonology team with 3 different pulmonologist including (Dr. Monge, Dr. Chu and Dr. Jay) with agreement recommended transfer to [facility] for consideration of chest tube placement and possible intrapleural lysis.See discharge summary 01/21/2023 for more details of his recent hx.\" Patient s/p tracheostomy (initially placed 11/29/2022); self-decannulated 2/2/2023 and now on 2L O2 via NC.      Multiple swallow evaluations completed since onset. Video swallow study 1/13/2023: \"Patient presents with moderate oropharyngeal dysphagia characterized by mildly decreased bolus prep and AP " "bolus transit, delayed swallow reflex and decreased pharyngeal constriction.  As result, demonstrated aspiration and penetration inconsistently with mildly thick and thin liquids.  Inconsistent cough reflex in response to aspiration. Appeared to be dependent on volume of bolus aspirated. Start diet of soft and bite sized and moderately thick liquids. Continues to be at risk for aspiration warranting ongoing analysis and intervention.\" Per most recent SLP session 1/20/2023: \"Recommend continue soft/bite size diet with moderately thick liquids (IDDSI 6, 3) given swallow strategies (fully upright position at 90 degrees, small bites/sips, slow pace). Patient needs assist for set up and at meals due to weakness/fatigue andconfusion. Demonstrates good potential for implementation of pharyngeal exercises. Adequate participation level despite lethargy, but needed encouragement. SLP to follow for dysphagia.\"    2/23 Clinical: Clinical swallow evaluation completed per MD order. Moderate oropharyngeal dysphagia based on clinical findings today in conjunction with recent video swallow study 1/13/2023. Patient has hx of silent aspiration of thin and mildly thick liquids. No aspiration occurred with moderately thick liquids or soft/bite sized textures. Patient must follow swallow strategies consistently for safety and will need close supervision and assist due to level of fatigue and confusion. Recommend continue soft/bite size diet with moderately thick liquids (IDDSI 6, 3) given swallow strategies (fully upright position at 90 degrees, small bites/sips, slow pace). Patient needs assist for set up and at meals due to weakness/fatigue and confusion. Anticipate repeat video swallow study now that patient decannulated. Patient may benefit from ENT consult due to persistent dysphonia. SLP to follow for dysphagia.   General Observations Pt was alert and cooperative, however, limited verbalizations produced and pt was distracted " during the study.  Mod-max to max cues were needed for pt to swallow and attempt swallow strategies.  Limited po trials were presented due to length of pt's swallow delay and delayed ability to follow.  AP was not completed due to pt fatigue at the end of the study.   General Swallowing Observations   Past History of Dysphagia See above   Swallowing Evaluation Videofluoroscopic swallow study (VFSS)   VFSS Evaluation   Radiologist Dr. Krishnan   Views Taken left lateral   Physical Location of Procedure [facility]   VFSS Eval: Thin Liquid Texture Trial   Mode of Presentation, Thin Liquid spoon   Preparatory Phase poor bolus control   Oral Phase, Thin Liquid premature pharyngeal entry   Bolus Location When Swallow Triggered pyriforms   Pharyngeal Phase, Thin Liquid impaired hyolaryngeal excursion;impaired epiglottic movement;impaired tongue base retraction;impaired pharyngoesophageal segment opening   Rosenbek's Penetration Aspiration Scale: Thin Liquid Trial Results 8 - contrast passes glottis, visible subglottic residue remains, absent patient response (aspiration)   Diagnostic Statement Tight UES with slight reflux, poor swallow awareness, penetration with residual trial 8 and 9, unable to follow chin tuck trial 10 with aspiration and slight delayed coughing noted, max cues/SLP positioning of pt's head for trial 11 for a slight chin tuck with silent penetration to the vocal folds/aspiration noted, min to mild-mod residue   Order of Presentation 8, 9, 10, 11   VFSS Eval: Mildly Thick Liquids   Mode of Presentation spoon   Preparatory Phase poor bolus control   Oral Phase premature pharyngeal entry;impaired AP movement   Bolus Location When Swallow Triggered pyriforms  (into the vestibule trial 7)   Pharyngeal Phase impaired hyolaryngel excursion;impaired epiglottic movement;impaired tongue base retraction;impaired pharyngoesophageal segment opening   Rosenbek's Penetration Aspiration Scale 8 - contrast passes glottis,  visible subglottic residue remains, absent patient response (aspiration)   Diagnostic Statement Tight UES with slight reflux, poor swallow awareness, penetration with residual trial 6, silent penetration to the vocal folds/aspiration trial 7, pt did not follow mod-max cues to use a chin tuck, mild-mod residue   Order of Presentation 6, 7   VFSS Eval: Moderately Thick Liquids   Mode of Presentation spoon   Preparatory Phase poor bolus control   Oral Phase premature pharyngeal entry;impaired AP movement   Bolus Location When Swallow Triggered pyriforms   Pharyngeal Phase impaired hyolaryngel excursion;impaired epiglottic movement;impaired tongue base retraction;impaired pharyngoesophageal segment opening   Rosenbek's Penetration Aspiration Scale 2 - contrast enters airway, remains above the vocal cords, no residue remains (penetration)  (Slight flash penetration noted on recording review)   Diagnostic Statement Tight UES with slight reflux, poor swallow awareness with mod-max cues needed to swallow, mild-moderate pharyngeal residue   Order of Presentation 1, 2   VFSS Evaluation: Puree Solid Texture Trial   Mode of Presentation, Puree spoon   Preparatory Phase poor bolus control;prolonged bolus preparation   Oral Phase, Puree impaired AP movement;premature pharyngeal entry   Bolus Location When Swallow Triggered posterior laryngeal surface of epiglottis   Pharyngeal Phase, Puree impaired hyolaryngel excursion;impaired epiglottic movement;impaired tongue base retraction;impaired pharyngoesophageal segment opening   Rosenbek's Penetration Aspiration Scale: Puree Food Trial Results 1 - no aspiration, contrast does not enter airway  (No new penetration - suspect from previous trial)   Diagnostic Statement Tight UES with slight reflux, poor swallow awareness with max cues needed to swallow, mild-moderate pharyngeal residue   Order of Presentation 3   VFSS Eval: Soft & Bite Sized   Mode of Presentation spoon   Order of  presentation 4   Preparatory Phase poor bolus control;prolonged bolus preparation  (slow mastication)   Oral Phase impaired AP movement;premature phrayngeal entry   Bolus Location When Swallow Triggered pyriforms   Pharyngeal Phase impaired hyolaryngel excursion;impaired epiglottic movement;impaired tongue base retraction;impaired pharyngoesophageal segment opening   Rosenbek's Penetration Aspiration Scale 1 - no aspiration, contrast does not enter airway  (No new penetration - suspect from previous trial)   Diagnostic Statement Tight UES with slight reflux, poor swallow awareness with max cues needed to swallow, severe pooling of bolus in pharynx prior to swallow, mild-mod residue   VFSS Evaluation: Solid Food Texture Trial   Mode of Presentation, Solid spoon   Preparatory Phase poor bolus control;prolonged bolus preparation   Oral Phase, Solid impaired AP movement;premature pharyngeal entry   Bolus Location When Swallow Triggered valleculae   Pharyngeal Phase, Solid impaired hyolaryngel excursion;impaired epiglottic movement;impaired tongue base retraction;impaired pharyngoesophageal segment opening   Rosenbek's Penetration Aspiration Scale: Solid Food Trial Results 1 - no aspiration, contrast does not enter airway   Diagnostic Statement Tight UES with slight reflux, poor swallow awareness with mod-cues needed to swallow, severe pooling of bolus in vallelculae prior to swallow, mild-mod residue   Order of Presentation 5   Esophageal Phase of Swallow   Esophageal comments Tight UES with slight reflux during today's study   Swallowing Recommendations   Diet Consistency Recommendations soft & bite-sized (level 6);moderately thick liquids/liquidized (level 3)   Supervision Level for Intake 1:1 supervision needed   Mode of Delivery Recommendations bolus size, small;liquids via spoon only;slow rate of intake  (cue/verify 2 hard swallows per tsp)   Swallowing Maneuver Recommendations alternate food and liquid intake    Monitoring/Assistance Required (Eating/Swallowing) monitor for cough or change in vocal quality with intake   Recommended Feeding/Eating Techniques (Swallow Eval) maintain upright posture during/after eating for 30 minutes;minimize distractions during oral intake;provide assist with feeding  (chew carefully, cue pt to cough if wet voicing noted)   Medication Administration Recommendations, Swallowing (SLP) Meds via TF or crushed with puree   Comment, Swallowing Recommendations Ok for 1-5 ice chips per hour with RN supervision and cues to use swallow-cough-swallow x 2 per ice chip.  Hold po if aspiration signs are noted/respiratory status declines   Clinical Impression   Criteria for Skilled Therapeutic Interventions Met (SLP Eval) Yes, treatment indicated   SLP Diagnosis Moderate oral-pharyngeal dysphagia, decreased awareness further increases pt's aspiration risk at this time   Risks & Benefits of therapy have been explained evaluation/treatment results reviewed;care plan/treatment goals reviewed;risks/benefits reviewed;current/potential barriers reviewed;participants voiced agreement with care plan;participants included;patient   Clinical Impression Comments Pt presents with moderate oral-pharyngeal dysphagia; however, pt has an increased risk for aspiration compared to previous study due to decreased awareness, mod-max to max cues needed for pt to swallow at times, and an increased swallow delay.  Decreased pharyngeal strength and ROM were also noted today.  Deficits resulted in min to mild-moderate pharyngeal residue, slight flash penetration with moderately thick liquids by tsp per recording review and silent aspiration with both mildly thick and thin liquids by tsp.  Pt is not able to follow cues to use strategies effectively at this time.  Diet and liquid upgrades are not recommended at this time from a safety standpoint.  Will defer to MD/family/pt if wishes are to advance po despite increased aspiration  risk.  If goals remain restorative, safest po option is to continue an IDDSI Diet Level 6 and moderately thick liquids by tsp only with increased use of precautions/strategies and minimal ice chips for comfort with strict use of strategies.  Hold po if increased aspiration signs are noted/respiratory status declines.  A free water protocol may be appropriate for pt if respiratory status remains stable on room air.  Plan to continue Tx to assess diet tolerance, train strategies, and complete exercises.  Pt continues to have signs of vocal cord weakness and question if stoma air escape is contributing to some increased pharyngeal weakness.  ENT follow up can be completed as an OP given primary aspiration risk is due to oral-pharyngeal deficits.  OP ENT consult recommended to check stoma healing, assess vocal fold movement, and assess if any treatment options are available to improve vocal fold function.   SLP Total Evaluation Time   Evaluation, videofluoroscopic eval of swallow function Minutes (23106) 15   Swallowing Dysfunction &/or Oral Function for Feeding   Treatment of Swallowing Dysfunction &/or Oral Function for Feeding Minutes (90433) 15   Symptoms Noted During/After Treatment   (Pt uncomfortable sitting in the chair.  Chair was adjusted.)   Treatment Detail/Skilled Intervention Swallow: Educated pt and nursing regarding current deficits, increased aspiration risk with mildly thick and thin liquids and advanced solids, need for increased use of precautions/strategies with current diet if goals are to remain restorative and maximize safety.  Educated pt on effortful swallow exercise to continue.   Pt will need continued education given pt was distracted during Tx.  Pt produced a very weak cough on 2/2 trials given mod-max cues to use an effortful cough.   SLP Discharge Planning   SLP Plan SLP - assess diet tolerance, train strategies, train swallow-cough-swallow x 2 with ice chips, exercises   SLP Discharge  Recommendation Transitional Care Facility   SLP Rationale for DC Rec Swallow and cognitive function below baseline.  Assist with meals after discharge   SLP Brief overview of current status  Moderate dysphagia, decreased awareness/swallow intiation is further increasing aspiration risk; Rec: increase use of precautions/strategies with a continued IDDSI Diet Level 6 and moderately thick liquids by tsp, hold po if increased aspiration signs are noted/respiratory status declines   Total Session Time   Total Session Time (sum of timed and untimed services) 30

## 2023-02-07 NOTE — PLAN OF CARE
"Goal Outcome Evaluation:  Orientations: A&Ox2, disoriented to situation, refuses to answer time.   Vitals/Pain: VSS on RA. C/o pain in abd this afternoon. Feeling more distended. Tylenol, oxy, simethicone given.   Tele: N/A  Lines/Drains: PIVx1, dialysis port. Rectal tube, balloon re-inflated to 45cc, scant leakage. 600 mL out total for shift.   Skin/Wounds: Scattered bruising and scabs. Bottom is red but intact and blanchable. Barrier cream applied and cleaned well. T/R q.2H. PEG site WDL. Trach site healing well.   GI/: Soft and bite sized diet with mod. Thick liquids. Anuric on HD. Rectal tube in place.   Labs: Abnormal/Trends, Electrolyte Replacement- none  Ambulation/Assist: Ax2 lift, Up to chair x1.   Sleep Quality: Good  Plan: SLP to see pt at bedside tomorrow. Swallow study canceled today d/t paranoia. 1 L off in HD today.   Pt was refusing medications after dialysis today stating \"you're trying to poison me\" and \"the medications will make me die.\" Pt came around after awhile. Pt ate fruit and drank some johana water for lunch but refused more. Meds given at this time and TF started at 80mL/h. Pt did not eat dinner, had a lot of abd pain. Feeling more distended. Pain relieved after oxy. Another bolus TF to be given tonight. Family updated. In better spirits this evening. Mits off all day.     "

## 2023-02-07 NOTE — PROGRESS NOTES
[facility]    Medicine Progress Note - Hospitalist Service       Date of Admission:  1/21/2023  Assessment & Plan   Blanco Osborne is a 57 year-old male admitted on 1/21/2023 as a transfer from [facility] for evaluation of loculated pleural effusion. Patient has multiple medical problems including history of liver transplant 2016 due to alcohol abuse, pAF on chronic anticoagulation, history of diabetes mellitus type 2, CVA, hypertension, CHRISTIAN on BiPAP. 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  He was recovering in [facility] and was transferred to [facility] for consideration of chest tube placement and possible intrapleural lysis for worsening effusion      Acute metabolic encephalopathy   *Per wife at bedside patient has had waxing and waning mentation, coinciding with lactulose being held at [facility].   *Patient continues to be confused here, and has pulled out tracheostomy tube, PICC line and pulled his dialysis catheter  - Mental status continues to wax and wane, more alert 2/6 and 2/7, but intermittently paranoid  - Re-orient as needed  - Maintain normal day/night, sleep wake cycles  - Minimize sedating/altering medications as able  - Treat separate conditions as detailed above/below   - Continue lactulose TID  - Olanzapine BID PRN  - Restraints, sitter as needed     Bilateral pleural effusions   Acute now chronic respiratory failure requiring tracheostomy  - resolved    Pneumonia  - resolved   ARDS  - resolved    Right pneumothorax - resolved   *Initial event was parainfluenza and strep pneumo pneumonia back in November 2022. Treated for ARDS. Had failed extubation x2 and tracheostomy performed on previous hospitalization. *Had additional complications of bilateral pneumothoraces as well as hydropneumothorax-  pleural fluid grew candida parapsilosis  *Has already completed 4-week antifungal treatment. While in LTACH he was successfully weaned from the ventilator.  *Recently there were concern for worsening effusion while at [facility] and had thoracentesis 01/13 which returned exudative without growth on cultures. CT chest/abdomen/pelvis on 01/18 demonstrated worsening effusions and concerns for infected parapneumonic effusions with possible SBP.  Multiple pulmonologist at [facility] reviewed CT scan recommended transfer to [facility] for consideration of chest tube placement and possible intrapleural lysis  *Thoracic surgery (Dr. Jackson) consulted during admission   *CT chest 1/22, small effusions on imaging and so chest tube was not inserted.   *Patient pulled out his tracheostomy tube on the night 2/1-2/2 and is tolerating well without increased shortness of breath persistent cough or worsening hypoxia  - Respiratory status stable on room air  - Wound care consult for tracheostomy site care      Goals of care   As per report on 1/25 nursing had mentioned that patient had refused to undergo dialysis and want to stop care, palliative care was consulted.  Patient and his spouse denied wanting to stop care and wanted ongoing restorative care including full code     ESRD  Now on HD.  No return of renal function.  - Nephrology following, continues on M-W-F schedule      S/p Liver transplant 2016   Cirrhosis with ascites  Subacute bacterial peritonitis  *Main manifestations of this are hepatic encephalopathy and ascites.  Hepatologist at [facility] felt that most likely reason for the cirrhosis was metabolic syndrome or alcohol intake.  There was no evidence of rejection on biopsy and there was some evidence of regeneration. Paracentesis done on 12/22/2022 showed lactobacillus indicating SBP, treated with Unasyn and Augmentin, finished 2-week course 1/12/2023.  Requires large-volume paracentesis periodically for recurring  ascites.    *Paracentesis 1/13/2023 yielded about 7500 cc. Cultures NGTD.  Most recent paracentesis on 1/24 with 4.8 L fluid removal  - Continue intermittent paracentesis as needed if develops symptomatic ascites. Declines paracentesis 2/7  - Further treatment for recurrent ascites with TIPS deferred to his Liver Transplant team and/or Hepatology, he has an appointment on 4/21/2023 with Hepatology, Dr. Simms  - Continue Tacrolimus 1 mg BID.  - Continue rifaximin   - Lactulose increased as above      Diarrhea  C difficile negative. Secondary to lactulose.  - Continue rectal tube     Paroxysmal atrial fibrillation  Remains in sinus rhythm.  On anticoagulation with apixaban indefinitely for stroke prophylaxis.       Acute anemia  Pt has required intermittent transfusions for on-going anemia.   - Anemia most likely secondary to critical illness/chronic disease, chronic renal disease  - Hemoglobin stable, 8.4 g/dl 2/6  - Transfuse packed red blood cells to keep Hb> 7     Dysphonia, persistent  - ENT previously consulted per SLP recommendations, has not yet seen, discussed with SLP again 2/7 and likely can be considered as an outpatient, though will comment in notes if felt strongly to be needed inpatient     History PEA arrest   PEA arrest prior to his previous admission and 1 episode of PEA associated with desaturation on 12/20.  No structural cardiac disease.   - Continue Cardiac Monitoring     Hypotension  Also has developed baseline hypotension 2/2 cirrhosis and prolonged critical illness.   - Continue midodrine       Seizure after hypoxic event  This is in the context of baseline multifactorial encephalopathy secondary to his ongoing critical illness and prior cardiac arrests and prior strokes and cirrhosis with hepatic encephalopathy. MRI of head of 12/20/22 reveals no PRES or leptomeningeal enhancement but scattered.  HHV6+ in CSF is regarded by neurology as unlikely to be a pathogen and Gancyclovir was  stopped.   - Continue levetiracetam, lacosamide       Diabetes mellitus type 2  *Hemoglobin A1c on November 2022 was 4.7  *PTA looks like was on 30 units of Lantus every morning, currently only on MDSSI  - Off blood sugar checks and sliding scale insulin as has been stable without insulin needs     Moderate malnutrition, protein and calorie type.  - Continue with tube feeds via PEG tube  - Nutritionist consulted and following     Anxiety  Fluoxetine was discontinued as per psychiatry recommendation on 2/2 (see consult note for details).       Clinically Significant Risk Factors           # Hypercalcemia: corrected calcium is >10.1, will monitor as appropriate    # Hypoalbuminemia: Lowest albumin = 1.9 g/dL at 1/23/2023  6:38 AM, will monitor as appropriate            # Severe Malnutrition: based on nutrition assessment           Diet: Soft & Bite Sized Diet (level 6) Liquidized/Moderately Thick (level 3)  Room Service  Adult Formula Bolus Feeding: Novasource Renal; Route: Gastrostomy; 3 times daily; Volume per Bolus: 240; mL(s); 80 mL/hr x 3 hrs back up bolus feeding after each meal ONLY IF INTAKE <50% of meal  Snacks/Supplements Adult: Other; 4 oz Ensure with breakfast, Gelatein+ with lunch and magic cup with dinner (RD); With Meals    DVT Prophylaxis: Pneumatic Compression Devices  Nixon Catheter: Not present  Code Status: Full Code      Disposition Plan      Expected Discharge Date: 02/14/2023,  3:00 PM    Destination: inpatient rehabilitation facility  Discharge Comments: 1/25 Palliative consult. [facility] won't take pt.  1/27 placement, no more TF, on dialysis and not able to sit up  1/31 referral sent to [facility] rehab     Entered: Brenden Armando MD 02/07/2023, 1:43 PM       The patient's care was discussed with the patient, patient's wife, bedside RN    Brenden Armando MD  Hospitalist Service  [facility]    ______________________________________________________________________    Interval History   No significant episodes of agitation noted overnight.  No new symptoms today.  He does not feel that his abdominal distention is significant enough to warrant paracentesis at this time. Continues to have some intermittent paranoia, but overall more alert     Data reviewed today: I reviewed all medications, new labs and imaging results over the last 24 hours. I personally reviewed no images or EKG's today.    Physical Exam   Vital Signs: Temp: 98.3  F (36.8  C) Temp src: Oral BP: 120/86 Pulse: 93   Resp: 22 SpO2: 91 % O2 Device: None (Room air)    Weight: 186 lbs 4.62 oz    Constitutional: Chronically ill appearing, NAD  Respiratory: Normal respiratory effort at rest   Cardiovascular: RRR.  GI: Distended, +ascites, non-tender  Skin/Integumen: Warm, dry  Neuro/psych:  Alert. Cooperative. No significant asterixis     Data   Recent Labs   Lab 02/06/23  0647 02/06/23  0600 02/05/23  1203 01/31/23  2123 01/31/23  1527   WBC  --  5.7  --   --  11.5*   HGB  --  8.4*  --   --  7.7*   MCV  --  103*  --   --  103*   PLT  --  165  --   --  164   NA  --  140  --   --   --    POTASSIUM  --  3.8  --   --   --    CHLORIDE  --  96*  --   --   --    CO2  --  30*  --   --   --    BUN  --  44.1*  --   --   --    CR  --  3.97*  --   --   --    ANIONGAP  --  14  --   --   --    KELLY  --  9.5  --   --   --    * 120* 131*   < >  --    ALBUMIN  --  2.2*  --   --   --     < > = values in this interval not displayed.       No results found for this or any previous visit (from the past 24 hour(s)).  Medications       - MEDICATION INSTRUCTIONS for Dialysis Patients -   Does not apply See Admin Instructions     apixaban ANTICOAGULANT  5 mg Oral BID     barium sulfate  10 mL Oral Once     barium sulfate  10 mL Oral Once     barium sulfate 40%  10 mL Oral Once     barium sulfate 40%  10 mL Oral Once     folic acid  1 mg Oral or Feeding  Tube Daily     lacosamide  100 mg Oral or Feeding Tube BID     lactulose  20 g Oral TID     levETIRAcetam  1,000 mg Oral or Feeding Tube Q24H     lidocaine  1 patch Transdermal Q24H     lidocaine   Transdermal Q8H BIJU     midodrine  10 mg Oral or Feeding Tube TID w/meals     mineral oil-hydrophilic petrolatum   Topical Daily     nystatin  500,000 Units Swish & Spit 4x Daily     pantoprazole  40 mg Oral or Feeding Tube BID AC     [Held by provider] QUEtiapine  25 mg Oral At Bedtime     rifaximin  550 mg Oral or Feeding Tube BID     sodium chloride (PF)  10-30 mL Intracatheter Q8H     sodium chloride (PF)  3 mL Intracatheter Q8H     tacrolimus  1 mg Oral BID IS

## 2023-02-07 NOTE — PROGRESS NOTES
[facility] Nurse Inpatient Assessment     Consulted for: Coccyx    Patient History (according to provider note(s):      Blanco Osborne is a 57 year old male admitted on 1/21/2023 as a transfer from [facility] for evaluation of loculated pleural effusion. Patient has multiple medical problems including history of liver transplant 2016 due to alcohol abuse, pAF on chronic anticoagulation, history of DM2, CVA, HTN, CHRISTIAN on BiPAP. 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 Discharge to [facility], back to [facility] and back to [facility] during month of December.     Recently while at [facility] CT chest/abdomen/pelvis on 01/18 demonstrated concerns for infected parapneumonic effusions and SBP.  CT findings discussed with Pulmonology team with 3 different pulmonologist including (Dr. Monge, Dr. Chu and Dr. Jay) with agreement that patient needs chest tube and possible lytics and therefore transfer to higher level of care.     Areas Assessed:      Areas visualized during today's visit: Sacrum/coccyx    Wound location: coccyx    Last photo: none taken  Wound due to: Pressure Injury   Wound history/plan of care: 2 of the 3 small wounds have healed with one remaining over the coccyx. DTPI vs purpura POA. Pt laying on his left side during visit which he reports is the position he is most comfortable in.  Wound base:  100% intact epidermis, no erythema     Palpation of the wound bed: normal      Drainage: none     Description of drainage: none     Measurements (length x width x depth, in cm): NA     Tunneling: N/A     Undermining: N/A  Periwound skin: Intact      Color: normal and consistent with surrounding tissue      Temperature: normal   Odor: none  Pain: denies  Pain interventions prior to dressing change: patient tolerated  well  Treatment goal: Protection  STATUS: healed  Supplies ordered: at bedside and supplies stored on unit       Wound location: Midline low back    Last photo: 2/7/23 1/30/23        Wound due to: Skin Tear  Wound history/plan of care: Pt has purpura all over body and appears skin was torn open.   Wound base: 100 % superficial scab     Palpation of the wound bed: normal      Drainage: none     Description of drainage: serosanguinous     Measurements (length x width x depth, in cm): 1.0  x 1.2  x  0 cm      Tunneling: N/A     Undermining: N/A  Periwound skin: Intact      Color: normal and consistent with surrounding tissue      Temperature: normal   Odor: none  Pain: denies , none  Pain interventions prior to dressing change: N/A  Treatment goal: Heal   STATUS: healing  Supplies ordered: at bedside    Wound location:  Trach site     Last photo: 2-3-23 trach site     2-3-23 left of trach site     Wound due to: tracheostomy  Wound history/plan of care: pt pulled out trach 2/1, chart reviewed, no plan for replacement at this time as pt tolerating well  Wound base: moist pink mucosal tissue, sutures present; neighboring skin tear moist pink dermis, bleeding. Dressing from 2/3 remained intact with dried drainage, bleeding with removal.     Palpation of the wound bed: normal      Drainage: small      Description of drainage: bloody     Measurements (length x width x depth, in cm): approx 2 x 2cm area, not probed for depth; skin tear approx 2 x 0.8 x <0.1cm  no changes  Periwound skin: intact      Color: normal and consistent with surrounding tissue      Temperature: normal   Odor: none  Pain: denies     Pain interventions prior to dressing change: slow and gentle cares   Treatment goal: Drainage control and Protection  STATUS: initial assessment  Supplies ordered: supplies stored on unit         Treatment Plan:      low back wound(s): Every 3 days   1. Clean wound with saline or MicroKlenz Spray, pat dry  2. Wipe /  "\"clean\" the surrounding periwound tissue with skin prep (Cavilon No Sting Skin Prep #996437) and allow to dry. This will help protect periwound and help dressing adherence  3. Press a Mepilex Border Dressing (#145466)   to the area, making sure to conform nicely to skin curvatures.   4. Time and date dressing change  NOTE  -Reposition pt side to side preston when in bed, every 2 hours-get the pt way over on side to completely offload pressure. This will benefit skin and respiratory function   -Keep heels elevated and floating on pillows at all times. Try using at least 2 pillows under each calf.  -When up to the chair pt needs to fully offload every 2 hours and use a chair cushion if needed          Pressure Injury Prevention (PIP) Plan:  If patient is declining pressure injury prevention interventions: Explore reason why and address patient's concerns, Educate on pressure injury risk and prevention intervention(s), If patient is still declining, document \"informed refusal\"  and Ensure Care team is aware ( provider, charge nurse, etc)  Mattress: Follow bed algorithm, reassess daily and order specialty mattress, if indicated.  HOB: Maintain at or below 30 degrees, unless contraindicated  Repositioning in bed: Every 1-2 hours , Left/right positioning; avoid supine and Raise foot of bed prior to raising head of bed, to reduce patient sliding down (shear)  Heels: Keep elevated off mattress, Pillows under calves and Heel lift boots  Protective Dressing: Sacral Mepilex for prevention (#010122),  especially for the agitated patient   Positioning Equipment: None  Chair positioning: Chair cushion (#644169)  and Assist patient to reposition hourly   If patient has a buttock pressure injury, or high risk for PI use chair cushion or SPS.  Moisture Management: Perineal cleansing /protection: Follow Incontinence Protocol, Avoid brief in bed, Clean and dry skin folds with bathing , Consider InterDry (#202932) between folds and " Moisturize dry skin  Under Devices: Inspect skin under all medical devices during skin inspection , Ensure tubes are stabilized without tension and Ensure patient is not lying on medical devices or equipment when repositioned  Ask provider to discontinue device when no longer needed.    Left of trach site skin tear -   1. cleanse with saline  2. cover with non-adherent dressing, ie PolyMem or Optifoam Gentle Border.    3. Change every 2-3 days and prn until healed over.     Orders: Reviewed    RECOMMEND PRIMARY TEAM ORDER: None, at this time  Education provided: importance of repositioning, plan of care, wound progress and Off-loading pressure  Discussed plan of care with: Patient and Nurse  WO nurse follow-up plan: weekly  Notify WOC if wound(s) deteriorate.  Nursing to notify the Provider(s) and re-consult the WO Nurse if new skin concern.    DATA:     Current support surface: Standard  Low air loss (MIQUEL pump, Isolibrium, Pulsate, skin guard, etc)  Containment of urine/stool: Internal fecal management  BMI: Body mass index is 25.27 kg/m .   Active diet order: Orders Placed This Encounter      Soft & Bite Sized Diet (level 6) Liquidized/Moderately Thick (level 3)     Output: I/O last 3 completed shifts:  In: 360 [P.O.:120; NG/GT:240]  Out: 2100 [Other:1000; Stool:1100]     Labs:   Recent Labs   Lab 02/06/23  0600   ALBUMIN 2.2*   HGB 8.4*   WBC 5.7     Pressure injury risk assessment:   Sensory Perception: 3-->slightly limited  Moisture: 3-->occasionally moist  Activity: 2-->chairfast  Mobility: 2-->very limited  Nutrition: 2-->probably inadequate  Friction and Shear: 2-->potential problem  Kareem Score: 14    PETER Dorado: [facility] Nurse [Jacobo]  Dept. Office Number: [phone]

## 2023-02-07 NOTE — PLAN OF CARE
Neuro: AO to self,   Tele/cardiac: NA  Respiration: on RA  Activity: A2, sitter at bed side, intermittently anxious, yelling for help, stating 'these 2 women want to kill me, help me Isac' restless, uncooperative, pulling at lines and tubings, mitts on. settled down later, calm and cooperative  Pain: needed no pain med this shift  Drips: PIV SL  Drains/tubes: rectal tube  Skin: scattered bruising and scabs, blanchable redness to josé luis area, barrier cream applied  GI/: Soft and bite sized diet with mod. Thick liquids. Anuric on HD. Rectal tube in place. TF given per orders  Aggression color: green  Isolation: none

## 2023-02-08 ENCOUNTER — APPOINTMENT (OUTPATIENT)
Dept: SPEECH THERAPY | Facility: CLINIC | Age: 58
DRG: 981 | End: 2023-02-08
Attending: STUDENT IN AN ORGANIZED HEALTH CARE EDUCATION/TRAINING PROGRAM
Payer: COMMERCIAL

## 2023-02-08 ENCOUNTER — APPOINTMENT (OUTPATIENT)
Dept: INTERVENTIONAL RADIOLOGY/VASCULAR | Facility: CLINIC | Age: 58
DRG: 981 | End: 2023-02-08
Attending: INTERNAL MEDICINE
Payer: COMMERCIAL

## 2023-02-08 PROCEDURE — 250N000013 HC RX MED GY IP 250 OP 250 PS 637: Performed by: STUDENT IN AN ORGANIZED HEALTH CARE EDUCATION/TRAINING PROGRAM

## 2023-02-08 PROCEDURE — 258N000003 HC RX IP 258 OP 636: Performed by: STUDENT IN AN ORGANIZED HEALTH CARE EDUCATION/TRAINING PROGRAM

## 2023-02-08 PROCEDURE — 250N000013 HC RX MED GY IP 250 OP 250 PS 637: Performed by: INTERNAL MEDICINE

## 2023-02-08 PROCEDURE — 90937 HEMODIALYSIS REPEATED EVAL: CPT

## 2023-02-08 PROCEDURE — 49450 REPLACE G/C TUBE PERC: CPT

## 2023-02-08 PROCEDURE — 92526 ORAL FUNCTION THERAPY: CPT | Mod: GN | Performed by: SPEECH-LANGUAGE PATHOLOGIST

## 2023-02-08 PROCEDURE — 250N000009 HC RX 250: Performed by: STUDENT IN AN ORGANIZED HEALTH CARE EDUCATION/TRAINING PROGRAM

## 2023-02-08 PROCEDURE — 255N000002 HC RX 255 OP 636: Performed by: RADIOLOGY

## 2023-02-08 PROCEDURE — 120N000001 HC R&B MED SURG/OB

## 2023-02-08 PROCEDURE — 0DH63UZ INSERTION OF FEEDING DEVICE INTO STOMACH, PERCUTANEOUS APPROACH: ICD-10-PCS | Performed by: RADIOLOGY

## 2023-02-08 PROCEDURE — 250N000012 HC RX MED GY IP 250 OP 636 PS 637: Performed by: STUDENT IN AN ORGANIZED HEALTH CARE EDUCATION/TRAINING PROGRAM

## 2023-02-08 PROCEDURE — 99232 SBSQ HOSP IP/OBS MODERATE 35: CPT | Performed by: STUDENT IN AN ORGANIZED HEALTH CARE EDUCATION/TRAINING PROGRAM

## 2023-02-08 PROCEDURE — 99233 SBSQ HOSP IP/OBS HIGH 50: CPT | Performed by: INTERNAL MEDICINE

## 2023-02-08 RX ORDER — IOPAMIDOL 612 MG/ML
50 INJECTION, SOLUTION INTRAVASCULAR ONCE
Status: COMPLETED | OUTPATIENT
Start: 2023-02-08 | End: 2023-02-08

## 2023-02-08 RX ORDER — UREA 8.5 G/85G
CREAM TOPICAL 2 TIMES DAILY PRN
Status: DISCONTINUED | OUTPATIENT
Start: 2023-02-08 | End: 2023-04-01

## 2023-02-08 RX ORDER — LACTULOSE 10 G/15ML
15 SOLUTION ORAL 2 TIMES DAILY
Status: DISCONTINUED | OUTPATIENT
Start: 2023-02-08 | End: 2023-02-09

## 2023-02-08 RX ORDER — LANOLIN ALCOHOL/MO/W.PET/CERES
3 CREAM (GRAM) TOPICAL AT BEDTIME
Status: DISCONTINUED | OUTPATIENT
Start: 2023-02-08 | End: 2023-02-21

## 2023-02-08 RX ORDER — OLANZAPINE 5 MG/1
5 TABLET, ORALLY DISINTEGRATING ORAL AT BEDTIME
Status: DISCONTINUED | OUTPATIENT
Start: 2023-02-08 | End: 2023-02-09

## 2023-02-08 RX ADMIN — MIDODRINE HYDROCHLORIDE 10 MG: 5 TABLET ORAL at 17:12

## 2023-02-08 RX ADMIN — Medication: at 09:43

## 2023-02-08 RX ADMIN — LACTULOSE 15 G: 10 SOLUTION ORAL at 21:09

## 2023-02-08 RX ADMIN — NYSTATIN 500000 UNITS: 100000 SUSPENSION ORAL at 12:17

## 2023-02-08 RX ADMIN — SODIUM CHLORIDE 300 ML: 9 INJECTION, SOLUTION INTRAVENOUS at 09:41

## 2023-02-08 RX ADMIN — MELATONIN TAB 3 MG 3 MG: 3 TAB at 21:08

## 2023-02-08 RX ADMIN — OLANZAPINE 5 MG: 5 TABLET, ORALLY DISINTEGRATING ORAL at 17:12

## 2023-02-08 RX ADMIN — LEVETIRACETAM 1000 MG: 100 SOLUTION ORAL at 23:08

## 2023-02-08 RX ADMIN — OLANZAPINE 5 MG: 5 TABLET, ORALLY DISINTEGRATING ORAL at 23:07

## 2023-02-08 RX ADMIN — MIDODRINE HYDROCHLORIDE 10 MG: 5 TABLET ORAL at 12:16

## 2023-02-08 RX ADMIN — TACROLIMUS 1 MG: 5 CAPSULE ORAL at 21:09

## 2023-02-08 RX ADMIN — LACOSAMIDE 100 MG: 10 SOLUTION ORAL at 23:08

## 2023-02-08 RX ADMIN — ACETAMINOPHEN 650 MG: 325 TABLET, FILM COATED ORAL at 23:08

## 2023-02-08 RX ADMIN — MIDODRINE HYDROCHLORIDE 10 MG: 5 TABLET ORAL at 10:11

## 2023-02-08 RX ADMIN — NYSTATIN 500000 UNITS: 100000 SUSPENSION ORAL at 17:14

## 2023-02-08 RX ADMIN — SODIUM CHLORIDE 250 ML: 9 INJECTION, SOLUTION INTRAVENOUS at 09:42

## 2023-02-08 RX ADMIN — APIXABAN 5 MG: 5 TABLET, FILM COATED ORAL at 21:08

## 2023-02-08 RX ADMIN — RIFAXIMIN 550 MG: 550 TABLET ORAL at 21:08

## 2023-02-08 RX ADMIN — IOPAMIDOL 10 ML: 612 INJECTION, SOLUTION INTRAVENOUS at 10:47

## 2023-02-08 RX ADMIN — Medication 40 MG: at 17:12

## 2023-02-08 RX ADMIN — OLANZAPINE 5 MG: 5 TABLET, ORALLY DISINTEGRATING ORAL at 02:02

## 2023-02-08 ASSESSMENT — ACTIVITIES OF DAILY LIVING (ADL)
ADLS_ACUITY_SCORE: 69
ADLS_ACUITY_SCORE: 63
ADLS_ACUITY_SCORE: 63
ADLS_ACUITY_SCORE: 69
ADLS_ACUITY_SCORE: 69
ADLS_ACUITY_SCORE: 63
ADLS_ACUITY_SCORE: 69
ADLS_ACUITY_SCORE: 63
ADLS_ACUITY_SCORE: 65
ADLS_ACUITY_SCORE: 69

## 2023-02-08 NOTE — CONSULTS
Interventional Radiology - Progress Note  Inpatient - [facility]  2/8/2023    IR Brief Note    G-Tube came out overnight. IR consulted by Dr Lott for replacement. Initially placed 11/29/22, tract should be established. Will replace G-Tube today. Consent obtained from spouse.    Russell Horn PA-C  Interventional Radiology  [phone] (IR)  [phone] ([facility] Office)

## 2023-02-08 NOTE — PROGRESS NOTES
Potassium   Date Value Ref Range Status   02/06/2023 3.8 3.4 - 5.3 mmol/L Final   12/29/2022 3.4 3.4 - 5.3 mmol/L Final   04/20/2020 3.9 3.4 - 5.3 mmol/L Final     Potassium POCT   Date Value Ref Range Status   01/19/2023 3.6 3.5 - 5.0 mmol/L Final     Hemoglobin   Date Value Ref Range Status   02/06/2023 8.4 (L) 13.3 - 17.7 g/dL Final   04/20/2020 14.3 13.3 - 17.7 g/dL Final     Creatinine   Date Value Ref Range Status   02/06/2023 3.97 (H) 0.67 - 1.17 mg/dL Final   04/20/2020 1.06 0.66 - 1.25 mg/dL Final     Urea Nitrogen   Date Value Ref Range Status   02/06/2023 44.1 (H) 6.0 - 20.0 mg/dL Final   12/29/2022 46 (H) 7 - 30 mg/dL Final   04/20/2020 23 7 - 30 mg/dL Final     Sodium   Date Value Ref Range Status   02/06/2023 140 136 - 145 mmol/L Final   04/20/2020 139 133 - 144 mmol/L Final     INR   Date Value Ref Range Status   12/21/2022 1.36 (H) 0.85 - 1.15 Final   10/07/2019 1.20 (H) 0.86 - 1.14 Final       DIALYSIS PROCEDURE NOTE  Hepatitis status of previous patient on machine log was checked and verified ok to use with this patients hepatitis status.  Patient dialyzed for 30 min. on a K3 bath with a net fluid removal of  0L.  A BFR of 350 ml/min was obtained via a R CVC      The treatment plan was discussed with Dr. Laboy during the treatment.    Total heparin received during the treatment: 0 units.     Line flushed, clamped and capped with heparin 1:1000 1.6 mL (1.6 units) per lumen    Meds  given: none   Complications: Hypotension not resolved with NS bolus x2. Pt had refused Midodrine this AM and had also pulled PEG feeding tube. Situation discussed with MD and I was directed to stop HD for today.      Person educated: pt. Knowledge base limited. Barriers to learning: confusion.ICEBOAT? Timeout performed pre-treatment  I: Patient was identified using 2 identifiers  C:  Consent Signed Yes  E: Equipment preventative maintenance is current and dialysis delivery system OK to use  B:    Latest Reference  Range & Units 02/02/23 06:18   Hep B Surface Agn Nonreactive  Nonreactive   Hepatitis B Core Angela Nonreactive  Nonreactive   Hepatitis B Surface Antibody Instrument Value <8.00 m[IU]/mL 0.54   Hepatitis B Surface Antibody  Nonreactive     O: Dialysis orders present and complete prior to treatment  A: Vascular access verified and assessed prior to treatment  T: Treatment was performed at a clinically appropriate time  ?: Patient was allowed to ask questions and address concerns prior to treatment  See Adult Hemodialysis flowsheet in [facility] for further details and post assessment.  Machine water alarm in place and functioning. Transducer pods intact and checked every 15min.   Pt returned via bed.  Chlorine/Chloramine water system checked every 4 hours.      Post treatment report given to Augusta Mitchell RN regarding 0L of fluid removed, last BP    Jesse Kurtz RN

## 2023-02-08 NOTE — PROVIDER NOTIFICATION
Pt pulled off his G tube, no bleeding noted, sterile dressing applied. MD notified, no orders for now, pt will be reviewed by day shift team for plan of care

## 2023-02-08 NOTE — PLAN OF CARE
Neuro: AO to self, intermittently uncooperative with cares  Tele/cardiac: NA  Respiration: on RA  Activity: A2, sitter at bed side, intermittently anxious, yelling for help, pulled out G-tube,   Pain: reported pain in the buttocks however refused pain medication  Drips: PIV SL  Drains/tubes: rectal tube  Skin: scattered bruising and scabs, blanchable redness to josé luis area, barrier cream applied  GI/: Soft and bite sized diet with mod. Thick liquids. Anuric on HD. Rectal tube in place.   Aggression color: green  Isolation: none

## 2023-02-08 NOTE — PROGRESS NOTES
[facility]    Medicine Progress Note - Hospitalist Service       Date of Admission:  1/21/2023  Assessment & Plan   Blanco Osborne is a 57 year-old male admitted on 1/21/2023 as a transfer from [facility] for evaluation of loculated pleural effusion. Patient has multiple medical problems including history of liver transplant 2016 due to alcohol abuse, pAF on chronic anticoagulation, history of diabetes mellitus type 2, CVA, hypertension, CHRISTIAN on BiPAP. 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  He was recovering in [facility] and was transferred to [facility] for consideration of chest tube placement and possible intrapleural lysis for worsening effusion      Acute metabolic encephalopathy   *Per wife at bedside patient has had waxing and waning mentation, coinciding with lactulose being held at [facility].   *Patient continues to be confused here, and has pulled out tracheostomy tube, PICC line and pulled his dialysis catheter  - Mental status continues to wax and wane, more alert 2/6 and 2/7, but intermittently paranoid  - Re-orient as needed  - Maintain normal day/night, sleep wake cycles  - Minimize sedating/altering medications as able  - Treat separate conditions as detailed above/below   -Patient is currently on lactulose 20 g 3 times daily.  Having significant diarrhea needing rectal tube.  Reduce dose of lactulose to 15g twice daily  - Olanzapine BID PRN  - Restraints, sitter as needed     Bilateral pleural effusions   Acute now chronic respiratory failure requiring tracheostomy  - resolved    Pneumonia  - resolved   ARDS  - resolved    Right pneumothorax - resolved   *Initial event was parainfluenza and strep pneumo pneumonia back in November 2022. Treated for ARDS. Had failed extubation x2 and tracheostomy  performed on previous hospitalization. *Had additional complications of bilateral pneumothoraces as well as hydropneumothorax- pleural fluid grew candida parapsilosis  *Has already completed 4-week antifungal treatment. While in LTACH he was successfully weaned from the ventilator.  *Recently there were concern for worsening effusion while at [facility] and had thoracentesis 01/13 which returned exudative without growth on cultures. CT chest/abdomen/pelvis on 01/18 demonstrated worsening effusions and concerns for infected parapneumonic effusions with possible SBP.  Multiple pulmonologist at [facility] reviewed CT scan recommended transfer to [facility] for consideration of chest tube placement and possible intrapleural lysis  *Thoracic surgery (Dr. Jackson) consulted during admission   *CT chest 1/22, small effusions on imaging and so chest tube was not inserted.   *Patient pulled out his tracheostomy tube on the night 2/1-2/2 and is tolerating well without increased shortness of breath persistent cough or worsening hypoxia  - Respiratory status stable on room air  - Wound care consult for tracheostomy site care      Goals of care   As per report on 1/25 nursing had mentioned that patient had refused to undergo dialysis and want to stop care, palliative care was consulted.  Patient and his spouse denied wanting to stop care and wanted ongoing restorative care including full code     ESRD  Now on HD.  No return of renal function.  - Nephrology following, continues on M-W-F schedule      S/p Liver transplant 2016   Cirrhosis with ascites  Subacute bacterial peritonitis  *Main manifestations of this are hepatic encephalopathy and ascites.  Hepatologist at [place] felt that most likely reason for the cirrhosis was metabolic syndrome or alcohol intake.  There was no evidence of rejection on biopsy and there was some evidence of regeneration. Paracentesis done on 12/22/2022 showed lactobacillus indicating SBP, treated with  Unasyn and Augmentin, finished 2-week course 1/12/2023.  Requires large-volume paracentesis periodically for recurring ascites.    *Paracentesis 1/13/2023 yielded about 7500 cc. Cultures NGTD.  Most recent paracentesis on 1/24 with 4.8 L fluid removal  - Continue intermittent paracentesis as needed if develops symptomatic ascites. Declines paracentesis 2/7  - Further treatment for recurrent ascites with TIPS deferred to his Liver Transplant team and/or Hepatology, he has an appointment on 4/21/2023 with Hepatology, Dr. Simms  - Continue Tacrolimus 1 mg BID.  - Continue rifaximin   - Lactulose dose reduced as noted above    Diarrhea  C difficile negative. Secondary to lactulose.  - Continue rectal tube     Paroxysmal atrial fibrillation  Remains in sinus rhythm.  On anticoagulation with apixaban indefinitely for stroke prophylaxis.       Acute anemia  Pt has required intermittent transfusions for on-going anemia.   - Anemia most likely secondary to critical illness/chronic disease, chronic renal disease  - Hemoglobin stable, 8.4 g/dl 2/6  - Transfuse packed red blood cells to keep Hb> 7     Dysphonia, persistent  - ENT previously consulted per SLP recommendations, has not yet seen, discussed with SLP again 2/7 and likely can be considered as an outpatient, though will comment in notes if felt strongly to be needed inpatient     History PEA arrest   PEA arrest prior to his previous admission and 1 episode of PEA associated with desaturation on 12/20.  No structural cardiac disease.   - Continue Cardiac Monitoring     Hypotension  Also has developed baseline hypotension 2/2 cirrhosis and prolonged critical illness.   - Continue midodrine       Seizure after hypoxic event  This is in the context of baseline multifactorial encephalopathy secondary to his ongoing critical illness and prior cardiac arrests and prior strokes and cirrhosis with hepatic encephalopathy. MRI of head of 12/20/22 reveals no PRES or leptomeningeal  enhancement but scattered.  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   - Continue levetiracetam, lacosamide       Diabetes mellitus type 2  *Hemoglobin A1c on November 2022 was 4.7  *PTA looks like was on 30 units of Lantus every morning, currently only on MDSSI  - Off blood sugar checks and sliding scale insulin as has been stable without insulin needs     Moderate malnutrition, protein and calorie type.  - Continue with tube feeds via PEG tube  Patient pulled out his PEG tube this morning.  IR replaced the PEG tube on 2/8/2023  - Nutritionist consulted and following     Anxiety  Fluoxetine was discontinued as per psychiatry recommendation on 2/2 (see consult note for details).       Clinically Significant Risk Factors              # Hypoalbuminemia: Lowest albumin = 1.9 g/dL at 1/23/2023  6:38 AM, will monitor as appropriate            # Severe Malnutrition: based on nutrition assessment           Diet: Room Service  Adult Formula Bolus Feeding: Novasource Renal; Route: Gastrostomy; 3 times daily; Volume per Bolus: 240; mL(s); 80 mL/hr x 3 hrs back up bolus feeding after each meal ONLY IF INTAKE <50% of meal  Snacks/Supplements Adult: Other; 4 oz Ensure with breakfast, Gelatein+ with lunch and magic cup with dinner (RD); With Meals  Soft & Bite Sized Diet (level 6) Liquidized/Moderately Thick (level 3) (By tsp only); No Straws    DVT Prophylaxis: Pneumatic Compression Devices  Nixon Catheter: Not present  Code Status: Full Code      Disposition Plan      Expected Discharge Date: 02/14/2023,  3:00 PM    Destination: inpatient rehabilitation facility  Discharge Comments: 2/8 sitter     Entered: Nicole Lott MD 02/08/2023, 1:00 PM       The patient's care was discussed with the patient, patient's wife, bedside RN    Nicole Lott MD  Hospitalist Service  [facility]  Hospital    ______________________________________________________________________    Interval History    Patient is new to me today.  Extensive chart reviewed  Patient pulled out his PEG tube overnight.  IR replaced it this afternoon.  Underwent dialysis today.  Resting in bed alert oriented x3.  No other acute events since yesterday    Data reviewed today: I reviewed all medications, new labs and imaging results over the last 24 hours. I personally reviewed no images or EKG's today.    Physical Exam   Vital Signs: Temp: 98.4  F (36.9  C) Temp src: Oral BP: 100/72 Pulse: 101   Resp: 16 SpO2: 96 % O2 Device: None (Room air)    Weight: 186 lbs 4.62 oz    Constitutional: Chronically ill appearing, NAD  Respiratory: Normal respiratory effort at rest   Cardiovascular: RRR.  GI: Distended, +ascites, non-tender  Skin/Integumen: Warm, dry  Neuro/psych:  Alert. Cooperative. No significant asterixis     Data   Recent Labs   Lab 02/06/23  0647 02/06/23  0600 02/05/23  1203   WBC  --  5.7  --    HGB  --  8.4*  --    MCV  --  103*  --    PLT  --  165  --    NA  --  140  --    POTASSIUM  --  3.8  --    CHLORIDE  --  96*  --    CO2  --  30*  --    BUN  --  44.1*  --    CR  --  3.97*  --    ANIONGAP  --  14  --    KELLY  --  9.5  --    * 120* 131*   ALBUMIN  --  2.2*  --        Recent Results (from the past 24 hour(s))   XR Video Swallow with SLP or OT    Narrative    VIDEO SWALLOWING EVALUATION   2/7/2023 2:25 PM     HISTORY: assess aspiration risk, potential to advance diet    COMPARISON: Swallow study 01/13/2023.    FLUOROSCOPY TIME: 2.3 minutes     Number of cine runs: 10    FINDINGS:    Thin: Silent aspiration.    Mildly thick: Penetration to the cords. No associated cough reflex.    Pudding: There was some residue in the vallecula and piriform sinuses  but no aspiration or penetration.    Semisolid: There was some residue in the vallecula and piriform  sinuses but no aspiration or penetration.    Solid: There was some  residue in the vallecula and piriform sinuses  but no aspiration or penetration.    Please see the separately dictated speech pathologist's notes for  further details.    This study only includes the cervical esophagus.    ALEJANDRA WHITE MD         SYSTEM ID:  [id number]     Medications       - MEDICATION INSTRUCTIONS for Dialysis Patients -   Does not apply See Admin Instructions     apixaban ANTICOAGULANT  5 mg Oral BID     folic acid  1 mg Oral or Feeding Tube Daily     lacosamide  100 mg Oral or Feeding Tube BID     lactulose  15 g Oral BID     levETIRAcetam  1,000 mg Oral or Feeding Tube Q24H     lidocaine  1 patch Transdermal Q24H     lidocaine   Transdermal Q8H BIJU     midodrine  10 mg Oral or Feeding Tube TID w/meals     mineral oil-hydrophilic petrolatum   Topical Daily     nystatin  500,000 Units Swish & Spit 4x Daily     pantoprazole  40 mg Oral or Feeding Tube BID AC     [Held by provider] QUEtiapine  25 mg Oral At Bedtime     rifaximin  550 mg Oral or Feeding Tube BID     sodium chloride (PF)  10-30 mL Intracatheter Q8H     sodium chloride (PF)  3 mL Intracatheter Q8H     tacrolimus  1 mg Oral BID IS

## 2023-02-08 NOTE — PLAN OF CARE
"Orientations: A&OX3-4. Intermittently disoriented to time. Paranoia more prevalent voicing concerns for \"being held hostage\" & requesting wife to call 911. PRN Zyprexa given X1 for agitation. Bedside attendant continued r/t pt pulling on lines.   Vitals/Pain: VSS on RA except hypotension while at dialysis (68/47). Unable to give a.m. Midodrine dose d/t pt refusal & PEG tube removal prior to HD. Denies pain  Lines/Drains: R forearm PIV SL. CVC heparin locked, covered w/ dressing, cdi. PEG tube replaced today in IR.  Skin/Wounds: Scattered abrasions & bruising. Blanchable redness on bottom.   GI/: Pt on HD & does not produce urine. Rectal tube in place leaking small amount. Soft, bite sized & mod thick liquid diet, but poor PO intake. Bolus TF through GT given X1.   Ambulation/Assist: A2 w/ lift use for transfers. Up in chair throughout shift  Plan: Plan for makeup HD run tomorrow. Continue poc.  "

## 2023-02-08 NOTE — IR NOTE
Interventional Radiology Intra-procedural Nursing Note    Patient Name: Blanco Osborne  Medical Record Number: [id number]  Today's Date: February 8, 2023    Start Time: 1043  End of procedure time: 1044  Procedure: Gastrostomy tube replacement  Report given to: IMC RN  Time pt departs:  1049    Other Notes: Pt into IR suite 2 via cart. Pt awake and alert. To table in supine position. Monitoring equipment applied. VSS. Dr. Hartley in room. Time out and procedure started. Pt tolerated procedure well. Debrief with Dr. Hartley. G-tube placed. Dressing CDI. No complications. Pt transferred back [facility].    Medications:    None    Osmar Garcia RN

## 2023-02-08 NOTE — PROGRESS NOTES
Renal Medicine Progress Note            Assessment/Plan:     Assessment:   1) End Stage Kidney Disease (CKD and subsequent ATN with non-recovery)   Initially LORETTA, but no renal recovery (anuric/oliguric), now considered ESRD. 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 On midodrine 10mg TID.     2) Anemia,  persistent anemia despite adequate iron stores and high epo dosing suggesting some epo resistance.      3) disposition:   not returning to LTACH. In case not able to go to inpatient rehab, would consider [facility] for TCU/rehab and dialysis. Checking hep B antigen, tristan and core tristan along with quant-gold for potential admission to outpatinet dialysis unit. These will be good for 30 days.     4) Diffuse pruritis  No hyperphosphatemia, though pruritis might be related to his renal disease. Ordered urea cream to trial.      Other:  Hx liver transplant 2016  Confusion/encephlopathy  DM  AMS, delerium     Plan/Recs:  1) HD Thursday due to low BPs 2/8 and patient's refusal to take midodrine prior to running  2) Epo 13,000 units with HD   3) weekly labs ordered for AM to help adjust potassium bath on dialysis, epo dosing, etc.  4) suggest [place] as TCU if not able to go to inpatient rehab  5) Urea cream BID for pruritis, can stop if not effective          Interval History:     -Pulled out PEG overnight  -At dialysis this morning, BP systolic 70s, patient refused to orally take midodrine.  Had to stop the dialysis run, will plan to run him tomorrow  -PEG replaced today  -Patient quite encephalopathic and confused, often stating things that do not quite make sense  -Denies shortness of breath  -Currently in mitts         Medications and Allergies:       - MEDICATION INSTRUCTIONS for Dialysis Patients -   Does not apply See Admin Instructions     apixaban ANTICOAGULANT  5 mg Oral BID     folic acid  1 mg Oral or Feeding  Tube Daily     lacosamide  100 mg Oral or Feeding Tube BID     lactulose  15 g Oral BID     levETIRAcetam  1,000 mg Oral or Feeding Tube Q24H     lidocaine  1 patch Transdermal Q24H     lidocaine   Transdermal Q8H BIJU     melatonin  3 mg Oral At Bedtime     midodrine  10 mg Oral or Feeding Tube TID w/meals     mineral oil-hydrophilic petrolatum   Topical Daily     nystatin  500,000 Units Swish & Spit 4x Daily     OLANZapine zydis  5 mg Oral At Bedtime     pantoprazole  40 mg Oral or Feeding Tube BID AC     rifaximin  550 mg Oral or Feeding Tube BID     sodium chloride (PF)  10-30 mL Intracatheter Q8H     sodium chloride (PF)  3 mL Intracatheter Q8H     tacrolimus  1 mg Oral BID IS      No Known Allergies         Physical Exam:   Vitals were reviewed  /72 (BP Location: Right arm)   Pulse 101   Temp 98.4  F (36.9  C) (Oral)   Resp 16   Wt 84.5 kg (186 lb 4.6 oz)   SpO2 96%   BMI 25.27 kg/m      Wt Readings from Last 3 Encounters:   01/31/23 84.5 kg (186 lb 4.6 oz)   01/21/23 82.4 kg (181 lb 11.2 oz)   12/28/22 96 kg (211 lb 10.3 oz)       Intake/Output Summary (Last 24 hours) at 2/5/2023 1043  Last data filed at 2/5/2023 1000  Gross per 24 hour   Intake 1340 ml   Output --   Net 1340 ml       GENERAL APPEARANCE: frail, weak appearing  HEENT:  unremarkable, trach site covered  CV: RRR, nl S1/S2  EXTREMITIES/SKIN: no c/c/rashes/lesions; no edema  LINES:  Right tunneled dialysis catheter present, no visible abnormalities.           Data:     BMP  Recent Labs   Lab 02/06/23  0647 02/06/23  0600 02/05/23  1203 02/05/23  0816   NA  --  140  --   --    POTASSIUM  --  3.8  --   --    CHLORIDE  --  96*  --   --    KELLY  --  9.5  --   --    CO2  --  30*  --   --    BUN  --  44.1*  --   --    CR  --  3.97*  --   --    * 120* 131* 99     CBC  Recent Labs   Lab 02/06/23  0600   WBC 5.7   HGB 8.4*   HCT 30.1*   *        Lab Results   Component Value Date    AST 19 01/28/2023    ALT 5 (L) 01/28/2023     ALKPHOS 235 (H) 01/28/2023    BILITOTAL 0.6 01/28/2023    CHANDLER 44 01/31/2023     Lab Results   Component Value Date    INR 1.36 (H) 12/21/2022       Attestation:  I have reviewed today's vital signs, notes, medications, labs and imaging.    Dwayne Laboy MD  [facility] - Nephrology  Office: [phone]

## 2023-02-08 NOTE — PRE-PROCEDURE
GENERAL PRE-PROCEDURE:   Procedure:  Gastrostomy tube replacement  Date/Time:  2/8/2023 10:06 AM    Written consent obtained?: Yes    Risks and benefits: Risks, benefits and alternatives were discussed    Consent given by:  Spouse  Patient states understanding of procedure being performed: Yes    Patient's understanding of procedure matches consent: Yes    Procedure consent matches procedure scheduled: Yes    ASA Class:  3  History & Physical reviewed:  History and physical reviewed and no updates needed  Statement of review:  I have reviewed the lab findings, diagnostic data, medications, and the plan for sedation

## 2023-02-09 ENCOUNTER — APPOINTMENT (OUTPATIENT)
Dept: SPEECH THERAPY | Facility: CLINIC | Age: 58
DRG: 981 | End: 2023-02-09
Attending: STUDENT IN AN ORGANIZED HEALTH CARE EDUCATION/TRAINING PROGRAM
Payer: COMMERCIAL

## 2023-02-09 PROCEDURE — 250N000013 HC RX MED GY IP 250 OP 250 PS 637: Performed by: INTERNAL MEDICINE

## 2023-02-09 PROCEDURE — 250N000011 HC RX IP 250 OP 636: Performed by: STUDENT IN AN ORGANIZED HEALTH CARE EDUCATION/TRAINING PROGRAM

## 2023-02-09 PROCEDURE — 250N000013 HC RX MED GY IP 250 OP 250 PS 637: Performed by: STUDENT IN AN ORGANIZED HEALTH CARE EDUCATION/TRAINING PROGRAM

## 2023-02-09 PROCEDURE — 634N000001 HC RX 634: Performed by: STUDENT IN AN ORGANIZED HEALTH CARE EDUCATION/TRAINING PROGRAM

## 2023-02-09 PROCEDURE — 92526 ORAL FUNCTION THERAPY: CPT | Mod: GN | Performed by: SPEECH-LANGUAGE PATHOLOGIST

## 2023-02-09 PROCEDURE — P9045 ALBUMIN (HUMAN), 5%, 250 ML: HCPCS | Performed by: STUDENT IN AN ORGANIZED HEALTH CARE EDUCATION/TRAINING PROGRAM

## 2023-02-09 PROCEDURE — 99233 SBSQ HOSP IP/OBS HIGH 50: CPT | Performed by: INTERNAL MEDICINE

## 2023-02-09 PROCEDURE — 250N000013 HC RX MED GY IP 250 OP 250 PS 637

## 2023-02-09 PROCEDURE — 250N000009 HC RX 250: Performed by: STUDENT IN AN ORGANIZED HEALTH CARE EDUCATION/TRAINING PROGRAM

## 2023-02-09 PROCEDURE — 90935 HEMODIALYSIS ONE EVALUATION: CPT | Performed by: STUDENT IN AN ORGANIZED HEALTH CARE EDUCATION/TRAINING PROGRAM

## 2023-02-09 PROCEDURE — 120N000001 HC R&B MED SURG/OB

## 2023-02-09 PROCEDURE — 250N000012 HC RX MED GY IP 250 OP 636 PS 637: Performed by: STUDENT IN AN ORGANIZED HEALTH CARE EDUCATION/TRAINING PROGRAM

## 2023-02-09 RX ORDER — LACTULOSE 10 G/15ML
10 SOLUTION ORAL 2 TIMES DAILY
Status: DISCONTINUED | OUTPATIENT
Start: 2023-02-09 | End: 2023-04-28 | Stop reason: HOSPADM

## 2023-02-09 RX ORDER — OLANZAPINE 5 MG/1
5 TABLET, ORALLY DISINTEGRATING ORAL AT BEDTIME
Status: DISCONTINUED | OUTPATIENT
Start: 2023-02-09 | End: 2023-02-28

## 2023-02-09 RX ADMIN — LACTULOSE 15 G: 10 SOLUTION ORAL at 07:44

## 2023-02-09 RX ADMIN — Medication 40 MG: at 17:50

## 2023-02-09 RX ADMIN — EPOETIN ALFA-EPBX 13000 UNITS: 10000 INJECTION, SOLUTION INTRAVENOUS; SUBCUTANEOUS at 10:13

## 2023-02-09 RX ADMIN — WHITE PETROLATUM: 1.75 OINTMENT TOPICAL at 07:42

## 2023-02-09 RX ADMIN — ACETAMINOPHEN 650 MG: 325 TABLET, FILM COATED ORAL at 10:22

## 2023-02-09 RX ADMIN — MIDODRINE HYDROCHLORIDE 10 MG: 5 TABLET ORAL at 07:40

## 2023-02-09 RX ADMIN — RIFAXIMIN 550 MG: 550 TABLET ORAL at 07:39

## 2023-02-09 RX ADMIN — TACROLIMUS 1 MG: 5 CAPSULE ORAL at 07:42

## 2023-02-09 RX ADMIN — RIFAXIMIN 550 MG: 550 TABLET ORAL at 22:08

## 2023-02-09 RX ADMIN — APIXABAN 5 MG: 5 TABLET, FILM COATED ORAL at 21:53

## 2023-02-09 RX ADMIN — MIDODRINE HYDROCHLORIDE 10 MG: 5 TABLET ORAL at 17:50

## 2023-02-09 RX ADMIN — Medication 2.5 MG: at 23:18

## 2023-02-09 RX ADMIN — Medication 40 MG: at 07:39

## 2023-02-09 RX ADMIN — NYSTATIN 500000 UNITS: 100000 SUSPENSION ORAL at 12:10

## 2023-02-09 RX ADMIN — ACETAMINOPHEN 325 MG: 325 TABLET, FILM COATED ORAL at 20:45

## 2023-02-09 RX ADMIN — TACROLIMUS 1 MG: 5 CAPSULE ORAL at 23:18

## 2023-02-09 RX ADMIN — MIDODRINE HYDROCHLORIDE 10 MG: 5 TABLET ORAL at 12:10

## 2023-02-09 RX ADMIN — NYSTATIN 500000 UNITS: 100000 SUSPENSION ORAL at 17:50

## 2023-02-09 RX ADMIN — LACOSAMIDE 100 MG: 10 SOLUTION ORAL at 22:08

## 2023-02-09 RX ADMIN — OLANZAPINE 5 MG: 5 TABLET, ORALLY DISINTEGRATING ORAL at 21:53

## 2023-02-09 RX ADMIN — APIXABAN 5 MG: 5 TABLET, FILM COATED ORAL at 07:39

## 2023-02-09 RX ADMIN — LACTULOSE 10 G: 10 SOLUTION ORAL at 23:19

## 2023-02-09 RX ADMIN — FOLIC ACID 1 MG: 1 TABLET ORAL at 07:40

## 2023-02-09 RX ADMIN — ALBUMIN HUMAN 250 ML: 0.05 INJECTION, SOLUTION INTRAVENOUS at 08:46

## 2023-02-09 RX ADMIN — LACOSAMIDE 100 MG: 10 SOLUTION ORAL at 07:39

## 2023-02-09 RX ADMIN — LEVETIRACETAM 1000 MG: 100 SOLUTION ORAL at 22:07

## 2023-02-09 RX ADMIN — MELATONIN TAB 3 MG 3 MG: 3 TAB at 21:53

## 2023-02-09 RX ADMIN — OLANZAPINE 5 MG: 5 TABLET, ORALLY DISINTEGRATING ORAL at 13:28

## 2023-02-09 ASSESSMENT — ACTIVITIES OF DAILY LIVING (ADL)
ADLS_ACUITY_SCORE: 63
ADLS_ACUITY_SCORE: 63
ADLS_ACUITY_SCORE: 65
ADLS_ACUITY_SCORE: 65
ADLS_ACUITY_SCORE: 61
ADLS_ACUITY_SCORE: 61
ADLS_ACUITY_SCORE: 63
ADLS_ACUITY_SCORE: 63
ADLS_ACUITY_SCORE: 65
ADLS_ACUITY_SCORE: 61
ADLS_ACUITY_SCORE: 63
ADLS_ACUITY_SCORE: 65

## 2023-02-09 NOTE — PROGRESS NOTES
Renal Medicine Progress Note            Assessment/Plan:     Assessment:   1) End Stage Kidney Disease (CKD and subsequent ATN with non-recovery)   Initially LORETTA, but no renal recovery (anuric/oliguric), now considered ESRD. 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 On midodrine 10mg TID.     2) Anemia,  persistent anemia despite adequate iron stores and high epo dosing suggesting some epo resistance.      3) disposition:   not returning to LTACH. In case not able to go to inpatient rehab, would consider [facility] for TCU/rehab and dialysis. Checking hep B antigen, tristan and core tristan along with quant-gold for potential admission to outpatinet dialysis unit. These will be good for 30 days.     4) Diffuse pruritis  No hyperphosphatemia, though pruritis might be related to his renal disease. Ordered urea cream to trial.      Other:  Hx liver transplant 2016  Confusion/encephlopathy  DM  AMS, delerium     Plan/Recs:  1) HD today due to hypotension yesterday, will plan for HD again tomorrow to get back on MWF schedule  2) Epo 13,000 units with HD   3) weekly labs ordered for AM to help adjust potassium bath on dialysis, epo dosing, etc.  4) suggest [place] as TCU if not able to go to inpatient rehab  5) Urea cream BID for pruritis, can stop if not effective          Interval History:     - continues confusion, mild agitation overnight frequently pulling at lines  - seen during dialysis this AM   - denies n/v  - denies SOB   - asks some non-sensical questions, but oriented to place         Medications and Allergies:       - MEDICATION INSTRUCTIONS for Dialysis Patients -   Does not apply See Admin Instructions     sodium chloride 0.9%  300 mL Hemodialysis Machine Once     apixaban ANTICOAGULANT  5 mg Oral BID     epoetin mirta-epbx  13,000 Units Intravenous Once in dialysis/CRRT     folic acid  1 mg Oral or Feeding Tube  Daily     sodium chloride (PF) 0.9%  10 mL Intracatheter Once in dialysis/CRRT    Followed by     heparin  1.3-2.6 mL Intracatheter Once in dialysis/CRRT     sodium chloride (PF) 0.9%  10 mL Intracatheter Once in dialysis/CRRT    Followed by     heparin  1.3-2.6 mL Intracatheter Once in dialysis/CRRT     lacosamide  100 mg Oral or Feeding Tube BID     lactulose  15 g Oral BID     levETIRAcetam  1,000 mg Oral or Feeding Tube Q24H     lidocaine  1 patch Transdermal Q24H     lidocaine   Transdermal Q8H BIJU     melatonin  3 mg Oral At Bedtime     midodrine  10 mg Oral or Feeding Tube TID w/meals     mineral oil-hydrophilic petrolatum   Topical Daily     - MEDICATION INSTRUCTIONS -   Does not apply Once     nystatin  500,000 Units Swish & Spit 4x Daily     OLANZapine zydis  5 mg Oral At Bedtime     pantoprazole  40 mg Oral or Feeding Tube BID AC     rifaximin  550 mg Oral or Feeding Tube BID     sodium chloride (PF)  10-30 mL Intracatheter Q8H     sodium chloride (PF)  3 mL Intracatheter Q8H     tacrolimus  1 mg Oral BID IS      No Known Allergies         Physical Exam:   Vitals were reviewed  BP (!) 83/50   Pulse 87   Temp 97.7  F (36.5  C) (Axillary)   Resp 29   Wt 84.5 kg (186 lb 4.6 oz)   SpO2 94%   BMI 25.27 kg/m      Wt Readings from Last 3 Encounters:   01/31/23 84.5 kg (186 lb 4.6 oz)   01/21/23 82.4 kg (181 lb 11.2 oz)   12/28/22 96 kg (211 lb 10.3 oz)       Intake/Output Summary (Last 24 hours) at 2/5/2023 1043  Last data filed at 2/5/2023 1000  Gross per 24 hour   Intake 1340 ml   Output --   Net 1340 ml       GENERAL APPEARANCE: frail, weak appearing  HEENT:  dry MM, trach site covered  CV: RRR, nl S1/S2  EXTREMITIES/SKIN: multiple bruises, thin friable skin, excoriations on back; no edema  LINES:  Right tunneled dialysis catheter present, no visible abnormalities.           Data:     BMP  Recent Labs   Lab 02/06/23  0647 02/06/23  0600 02/05/23  1203 02/05/23  0816   NA  --  140  --   --    POTASSIUM   --  3.8  --   --    CHLORIDE  --  96*  --   --    KELLY  --  9.5  --   --    CO2  --  30*  --   --    BUN  --  44.1*  --   --    CR  --  3.97*  --   --    * 120* 131* 99     CBC  Recent Labs   Lab 02/06/23  0600   WBC 5.7   HGB 8.4*   HCT 30.1*   *        Lab Results   Component Value Date    AST 19 01/28/2023    ALT 5 (L) 01/28/2023    ALKPHOS 235 (H) 01/28/2023    BILITOTAL 0.6 01/28/2023    CHANDLER 44 01/31/2023     Lab Results   Component Value Date    INR 1.36 (H) 12/21/2022       Attestation:  I have reviewed today's vital signs, notes, medications, labs and imaging.    Dwayne Laboy MD  [facility] - Nephrology  Office: [phone]

## 2023-02-09 NOTE — PROGRESS NOTES
CLINICAL NUTRITION SERVICES - REASSESSMENT NOTE      Future/Additional Recommendations:   Diet per SLP  Continue supplements as ordered  Continue TF as accepted by patient/family   Continue to recommend considering change to nocturnal regimen to give patient more nutrition with high repletion needs    Malnutrition: (1/22)  % Weight Loss:  > 5% in 1 month (severe malnutrition)  % Intake:  </= 50% for >/= 5 days (severe malnutrition)- suspected   Subcutaneous Fat Loss:  Orbital region moderate depletion, Upper arm region moderate-severe depletion and Thoracic region moderate-severe depletion  Muscle Loss:  Temporal region moderate depletion, Clavicle bone region severe depletion, Acromion bone region severe depletion, Patellar region moderate depletion and Anterior thigh region moderate-severe depletion  Fluid Retention:  Trace     Malnutrition Diagnosis: Severe malnutrition  In Context of:  Acute on Chronic illness or disease       EVALUATION OF PROGRESS TOWARD GOALS   Diet:  Soft & Bite Sized Diet + Moderately Thick Liquids per SLP recommendations   Room Service w/ Assist     Supplements:   Breakfast - 4 oz Ensure  Lunch - Gelatein+  Dinner - Magic Cup    Nutrition Support - Enteral:    Type of Feeding Tube: G-tube  Enteral Frequency:  Continuous  Enteral Regimen: Novasource Renal at 80 mL/hr x 3 hours TID --> IF consumes < 50% of meal  Total Enteral Provisions: 240 mL formula TID or 720 mL/day = 1440 kcal, 66 g protein, 132 g CHO, 0 g fiber and 516 mL free water (meeting approx 58% estimated energy needs and 66% estimated protein needs)    Free Water Flush: 100 mL before and after each feeding    Intake/Tolerance:    TF --   2/6: Bolus x 2 (480 mL)  2/7: Bolus x ~1 (210 mL)  2/8: Bolus x 1 (240 mL)    On average over past 3 days patient has received 310 mL/day providing additional 620 kcal (25% needs) and 28 g PRO (28% needs).     Oral -- Ongoing poor intakes, taking bites of most meals. Ate 50% of 1 meal yesterday  otherwise no oral intake documented in chart over past 3 days.     Labs reviewed - last drawn on 2/6  Meds - folic acid 1 mg/day, lactulose BID  Stool -  2/8: 400 mL    2/7: 500 mL  2/6: 1100 mL    Weight - Fluctuations likely with fluid status 2/2 paracentesis and dialysis but do suspect some true weight loss as well given poor nutritional status. No new weight since 1/31.   Wt Readings from Last 10 Encounters:   01/31/23 84.5 kg (186 lb 4.6 oz)   01/21/23 82.4 kg (181 lb 11.2 oz)   12/28/22 96 kg (211 lb 10.3 oz)   12/16/22 100.2 kg (221 lb)   12/16/22 100.2 kg (221 lb)   12/15/22 87 kg (191 lb 12.8 oz)   10/07/19 137.5 kg (303 lb 3.2 oz)   10/04/19 131.5 kg (290 lb)   02/20/19 140.4 kg (309 lb 9.6 oz)   07/10/18 137.5 kg (303 lb 3.2 oz)         ASSESSED NUTRITION NEEDS:  Dosing Weight 82.4 kg   Estimated Energy Needs: 5122-8546 kcals (30-35 Kcal/Kg)  Justification: repletion and HD  Estimated Protein Needs: + grams protein (1.2-1.5+ g pro/Kg)  Justification: Repletion and dialysis  Estimated Fluid Needs: per provider pending fluid status        NEW FINDINGS:   Continues on dialysis - only ran for 30 min yesterday d/t hypotension, plan for make-up run today     2/7:  WOCN =   - Coccyx - PI stage 2: healed 1/30   - Midline low back - Pt has purpura all over body and appears skin was torn open   Status: healing     Previous Goals:   EN + PO intake to meet % nutrition needs   Evaluation: Not met    Previous Nutrition Diagnosis:   Inadequate protein-energy intake related to decreased appetite with early satiety/bloating/nausea and intermittent refusal of TF as evidenced by PO + EN intake meeting </=  50% nutrition needs for the past ~3 weeks   Evaluation: No change       CURRENT NUTRITION DIAGNOSIS  Inadequate protein-energy intake related to decreased appetite with early satiety/bloating/nausea and intermittent refusal of TF as evidenced by PO + EN intake meeting </=  50% nutrition needs for the past ~3-4  weeks     INTERVENTIONS  Recommendations / Nutrition Prescription  Diet per SLP  Continue supplements as ordered  Continue TF as accepted by patient/family   Continue to recommend considering change to nocturnal regimen to give patient more nutrition with high repletion needs     Implementation  Enteral Nutrition - continue to encourage   Medical Food Supplement - continue as ordered     Goals  EN + PO intake to meet % nutrition needs       MONITORING AND EVALUATION:  Progress towards goals will be monitored and evaluated per protocol and Practice Guidelines      Sirena Gomez RD, LD

## 2023-02-09 NOTE — PLAN OF CARE
A&O x 1, to self, illogical thinking at times. VSS on RA. Total lift, not OOB during shift. Soft bite/Mod thick liquids diet, poor intake. PEG tube in place. PIV x1 CDI/SL. CVC heparin locked, dressing intact. Scattered bruising/scratches. Denies pain/nausea/sob. Rectal tube in place leaking a small amount. No UOP due to hemodialysis. Bedside attendant at bedside d/t continuation of pulling at lines. Continue plan of care.

## 2023-02-09 NOTE — PLAN OF CARE
Goal Outcome Evaluation:           Overall Patient Progress: no changeOverall Patient Progress: no change    Outcome Evaluation: Ongoing poor and variable oral intake as well as intermittent acceptance of bolus TF. Continue to encourage TF use and oral intake with patient's high nutrition needs.    Sirena Gomez RD, LD

## 2023-02-09 NOTE — PROGRESS NOTES
Potassium   Date Value Ref Range Status   02/06/2023 3.8 3.4 - 5.3 mmol/L Final   12/29/2022 3.4 3.4 - 5.3 mmol/L Final   04/20/2020 3.9 3.4 - 5.3 mmol/L Final     Potassium POCT   Date Value Ref Range Status   01/19/2023 3.6 3.5 - 5.0 mmol/L Final     Hemoglobin   Date Value Ref Range Status   02/06/2023 8.4 (L) 13.3 - 17.7 g/dL Final   04/20/2020 14.3 13.3 - 17.7 g/dL Final     Creatinine   Date Value Ref Range Status   02/06/2023 3.97 (H) 0.67 - 1.17 mg/dL Final   04/20/2020 1.06 0.66 - 1.25 mg/dL Final     Urea Nitrogen   Date Value Ref Range Status   02/06/2023 44.1 (H) 6.0 - 20.0 mg/dL Final   12/29/2022 46 (H) 7 - 30 mg/dL Final   04/20/2020 23 7 - 30 mg/dL Final     Sodium   Date Value Ref Range Status   02/06/2023 140 136 - 145 mmol/L Final   04/20/2020 139 133 - 144 mmol/L Final     INR   Date Value Ref Range Status   12/21/2022 1.36 (H) 0.85 - 1.15 Final   10/07/2019 1.20 (H) 0.86 - 1.14 Final       DIALYSIS PROCEDURE NOTE  Hepatitis status of previous patient on machine log was checked and verified ok to use with this patients hepatitis status.  Patient dialyzed for 30 min. on a K3 bath with a net fluid removal of  400 ml.  A BFR of 350-400 ml/min was obtained via a R CVC      The treatment plan was discussed with Dr. Laboy during the treatment.    Total heparin received during the treatment: 0 units.      Line flushed, clamped and capped with heparin 1:1000 1.6 mL (1.6 units) per lumen     Meds  given: retacrit, 5% albumin    Complications: PT was hypotensive at initiation, gave 250 ml albumin and BP normalized, total UF 400ml    Person educated: pt. Knowledge base limited. Barriers to learning: confusion.ICEBOAT? Timeout performed pre-treatment  I: Patient was identified using 2 identifiers  C:  Consent Signed Yes  E: Equipment preventative maintenance is current and dialysis delivery system OK to use  B:     Latest Reference Range & Units 02/02/23 06:18   Hep B Surface Agn Nonreactive   Nonreactive   Hepatitis B Core Angela Nonreactive  Nonreactive   Hepatitis B Surface Antibody Instrument Value <8.00 m[IU]/mL 0.54   Hepatitis B Surface Antibody   Nonreactive      O: Dialysis orders present and complete prior to treatment  A: Vascular access verified and assessed prior to treatment  T: Treatment was performed at a clinically appropriate time  ?: Patient was allowed to ask questions and address concerns prior to treatment  See Adult Hemodialysis flowsheet in EPIC for further details and post assessment.  Machine water alarm in place and functioning. Transducer pods intact and checked every 15min.   Pt returned via bed.  Chlorine/Chloramine water system checked every 4 hours.        Post treatment report given to NIA Falk RN regarding 0L of fluid removed, last BP     Jesse Kurtz RN

## 2023-02-09 NOTE — PLAN OF CARE
[id number]    Orientations: A/o - to self, unable to answer the difference between day/year/month  Vitals/Pain: VSS on RA BP soft  Tele: na  Lines/Drains: Dialysis catheter to RUC, IV SL to MARY, Gtube in place, Rectal tube in place.   Skin/Wounds: Scattered bruising/scabs, redness to sacrum  GI/: No UOP due to HD, Rectal tube draining  Labs: Abnormal/Trends, Electrolyte Replacement- All  Ambulation/Assist: Lift device x2 and turn and repo  Sleep Quality: restless  Plan: Dialysis 2/9/2023 unable to run 2/8

## 2023-02-09 NOTE — PROGRESS NOTES
[facility]    Medicine Progress Note - Hospitalist Service       Date of Admission:  1/21/2023  Assessment & Plan   Blanco Osborne is a 57 year-old male admitted on 1/21/2023 as a transfer from [facility] for evaluation of loculated pleural effusion. Patient has multiple medical problems including history of liver transplant 2016 due to alcohol abuse, pAF on chronic anticoagulation, history of diabetes mellitus type 2, CVA, hypertension, CHRISTIAN on BiPAP. 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  He was recovering in [facility] and was transferred to [facility] for consideration of chest tube placement and possible intrapleural lysis for worsening effusion      Acute metabolic encephalopathy most likely from hospital delirium  Underlying hepatic encephalopathy  *Per wife at bedside patient has had waxing and waning mentation, coinciding with lactulose being held at [facility].   *Patient continues to be confused here, and has pulled out tracheostomy tube, PICC line and pulled his dialysis catheter  - Mental status continues to wax and wane, more alert 2/6 and 2/7, but intermittently paranoid  - Re-orient as needed  - Maintain normal day/night, sleep wake cycles  - Minimize sedating/altering medications as able  - Treat separate conditions as detailed above/below   -Patient is currently on lactulose 15 g 2 times daily.  Having significant diarrhea needing rectal tube.  We will reduce lactulose dose to 10 g 2 times a day if rectal output slows down we will plan on removing the rectal tube in the next 24 hours  Zyprexa 5 mg at bedtime scheduled.  Melatonin 3 mg p.o. at bedtime scheduled  Patient slept better overnight  - Olanzapine BID PRN  - Restraints, sitter as needed     Bilateral pleural effusions   Acute now chronic  respiratory failure requiring tracheostomy  - resolved    Pneumonia  - resolved   ARDS  - resolved    Right pneumothorax - resolved   *Initial event was parainfluenza and strep pneumo pneumonia back in November 2022. Treated for ARDS. Had failed extubation x2 and tracheostomy performed on previous hospitalization. *Had additional complications of bilateral pneumothoraces as well as hydropneumothorax- pleural fluid grew candida parapsilosis  *Has already completed 4-week antifungal treatment. While in LTACH he was successfully weaned from the ventilator.  *Recently there were concern for worsening effusion while at [facility] and had thoracentesis 01/13 which returned exudative without growth on cultures. CT chest/abdomen/pelvis on 01/18 demonstrated worsening effusions and concerns for infected parapneumonic effusions with possible SBP.  Multiple pulmonologist at [facility] reviewed CT scan recommended transfer to [facility] for consideration of chest tube placement and possible intrapleural lysis  *Thoracic surgery (Dr. Jackson) consulted during admission   *CT chest 1/22, small effusions on imaging and so chest tube was not inserted.   *Patient pulled out his tracheostomy tube on the night 2/1-2/2 and is tolerating well without increased shortness of breath persistent cough or worsening hypoxia  - Respiratory status stable on room air  - Wound care consult for tracheostomy site care        Goals of care   As per report on 1/25 nursing had mentioned that patient had refused to undergo dialysis and want to stop care, palliative care was consulted.  Patient and his spouse denied wanting to stop care and wanted ongoing restorative care including full code     ESRD  Now on HD.  No return of renal function.  - Nephrology following, continues on M-W-F schedule      S/p Liver transplant 2016   Cirrhosis with ascites  Subacute bacterial peritonitis  *Main manifestations of this are hepatic encephalopathy and ascites.  Hepatologist at  [place] felt that most likely reason for the cirrhosis was metabolic syndrome or alcohol intake.  There was no evidence of rejection on biopsy and there was some evidence of regeneration. Paracentesis done on 12/22/2022 showed lactobacillus indicating SBP, treated with Unasyn and Augmentin, finished 2-week course 1/12/2023.  Requires large-volume paracentesis periodically for recurring ascites.    *Paracentesis 1/13/2023 yielded about 7500 cc. Cultures NGTD.  Most recent paracentesis on 1/24 with 4.8 L fluid removal  - Continue intermittent paracentesis as needed if develops symptomatic ascites. Declines paracentesis 2/7  - Further treatment for recurrent ascites with TIPS deferred to his Liver Transplant team and/or Hepatology, he has an appointment on 4/21/2023 with Hepatology, Dr. Simms  - Continue Tacrolimus 1 mg BID.  - Continue rifaximin   - Lactulose dose reduced as noted above    Diarrhea  C difficile negative. Secondary to lactulose.  - Continue rectal tube for now once rectal output slows down we will plan on removing it in the next 24 to 48 hours     Paroxysmal atrial fibrillation  Remains in sinus rhythm.  On anticoagulation with apixaban indefinitely for stroke prophylaxis.       Acute anemia  Pt has required intermittent transfusions for on-going anemia.   - Anemia most likely secondary to critical illness/chronic disease, chronic renal disease  - Hemoglobin stable, 8.4 g/dl 2/6  - Transfuse packed red blood cells to keep Hb> 7     Dysphonia, persistent  - ENT previously consulted per SLP recommendations, has not yet seen, discussed with SLP again 2/7 and likely can be considered as an outpatient, though will comment in notes if felt strongly to be needed inpatient     History PEA arrest   PEA arrest prior to his previous admission and 1 episode of PEA associated with desaturation on 12/20.  No structural cardiac disease.   - Continue Cardiac Monitoring     Hypotension  Also has developed baseline  hypotension 2/2 cirrhosis and prolonged critical illness.   - Continue midodrine       Seizure after hypoxic event  This is in the context of baseline multifactorial encephalopathy secondary to his ongoing critical illness and prior cardiac arrests and prior strokes and cirrhosis with hepatic encephalopathy. MRI of head of 12/20/22 reveals no PRES or leptomeningeal enhancement but scattered.  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   - Continue levetiracetam, lacosamide       Diabetes mellitus type 2  *Hemoglobin A1c on November 2022 was 4.7  *PTA looks like was on 30 units of Lantus every morning, currently only on MDSSI  - Off blood sugar checks and sliding scale insulin as has been stable without insulin needs     Moderate malnutrition, protein and calorie type.  - Continue with tube feeds via PEG tube  Patient pulled out his PEG tube this morning.  IR replaced the PEG tube on 2/8/2023  - Nutritionist consulted and following     Anxiety  Fluoxetine was discontinued as per psychiatry recommendation on 2/2 (see consult note for details).       Clinically Significant Risk Factors              # Hypoalbuminemia: Lowest albumin = 1.9 g/dL at 1/23/2023  6:38 AM, will monitor as appropriate            # Severe Malnutrition: based on nutrition assessment           Diet: Room Service  Adult Formula Bolus Feeding: Novasource Renal; Route: Gastrostomy; 3 times daily; Volume per Bolus: 240; mL(s); 80 mL/hr x 3 hrs back up bolus feeding after each meal ONLY IF INTAKE <50% of meal  Snacks/Supplements Adult: Other; 4 oz Ensure with breakfast, Gelatein+ with lunch and magic cup with dinner (RD); With Meals  Soft & Bite Sized Diet (level 6) Liquidized/Moderately Thick (level 3) (By tsp only); No Straws    DVT Prophylaxis: Pneumatic Compression Devices  Nixon Catheter: Not present  Code Status: Full Code      Disposition Plan      Expected Discharge Date: 02/14/2023,  3:00 PM     Destination: inpatient rehabilitation facility  Discharge Comments: 2/8 sitter  2/9 GI consult     Entered: Nicole Lott MD 02/09/2023, 2:22 PM       The patient's care was discussed with the patient, patient's wife, bedside RN    Nicole Lott MD  Hospitalist Service  [facility]    ______________________________________________________________________    Interval History       Patient is resting comfortably in bed.  Getting dialyzed today.  Slept better overnight as per bedside RN.  Still having significant stool output via rectal tube.  No other acute events    Data reviewed today: I reviewed all medications, new labs and imaging results over the last 24 hours. I personally reviewed no images or EKG's today.    Physical Exam   Vital Signs: Temp: 97.2  F (36.2  C) Temp src: Axillary BP: 109/73 Pulse: 83   Resp: 20 SpO2: 99 % O2 Device: None (Room air)    Weight: 186 lbs 4.62 oz    Constitutional: Chronically ill appearing, NAD  Respiratory: Normal respiratory effort at rest   Cardiovascular: RRR.  GI: Distended, +ascites, non-tender  Skin/Integumen: Warm, dry  Neuro/psych:  Alert. Cooperative. No significant asterixis     Data   Recent Labs   Lab 02/06/23  0647 02/06/23  0600 02/05/23  1203   WBC  --  5.7  --    HGB  --  8.4*  --    MCV  --  103*  --    PLT  --  165  --    NA  --  140  --    POTASSIUM  --  3.8  --    CHLORIDE  --  96*  --    CO2  --  30*  --    BUN  --  44.1*  --    CR  --  3.97*  --    ANIONGAP  --  14  --    KELLY  --  9.5  --    * 120* 131*   ALBUMIN  --  2.2*  --        No results found for this or any previous visit (from the past 24 hour(s)).  Medications       - MEDICATION INSTRUCTIONS for Dialysis Patients -   Does not apply See Admin Instructions     sodium chloride 0.9%  300 mL Hemodialysis Machine Once     apixaban ANTICOAGULANT  5 mg Oral BID     folic acid  1 mg Oral or Feeding Tube Daily     sodium chloride (PF) 0.9%  10 mL Intracatheter Once in  dialysis/CRRT    Followed by     heparin  1.3-2.6 mL Intracatheter Once in dialysis/CRRT     sodium chloride (PF) 0.9%  10 mL Intracatheter Once in dialysis/CRRT    Followed by     heparin  1.3-2.6 mL Intracatheter Once in dialysis/CRRT     lacosamide  100 mg Oral or Feeding Tube BID     lactulose  10 g Oral BID     levETIRAcetam  1,000 mg Oral or Feeding Tube Q24H     lidocaine  1 patch Transdermal Q24H     lidocaine   Transdermal Q8H BIJU     melatonin  3 mg Oral At Bedtime     midodrine  10 mg Oral or Feeding Tube TID w/meals     mineral oil-hydrophilic petrolatum   Topical Daily     nystatin  500,000 Units Swish & Spit 4x Daily     OLANZapine zydis  5 mg Oral At Bedtime     pantoprazole  40 mg Oral or Feeding Tube BID AC     rifaximin  550 mg Oral or Feeding Tube BID     sodium chloride (PF)  10-30 mL Intracatheter Q8H     sodium chloride (PF)  3 mL Intracatheter Q8H     tacrolimus  1 mg Oral BID IS

## 2023-02-10 LAB — BACTERIA PLR CULT: NO GROWTH

## 2023-02-10 PROCEDURE — 250N000011 HC RX IP 250 OP 636: Performed by: STUDENT IN AN ORGANIZED HEALTH CARE EDUCATION/TRAINING PROGRAM

## 2023-02-10 PROCEDURE — 250N000013 HC RX MED GY IP 250 OP 250 PS 637: Performed by: INTERNAL MEDICINE

## 2023-02-10 PROCEDURE — 634N000001 HC RX 634: Performed by: STUDENT IN AN ORGANIZED HEALTH CARE EDUCATION/TRAINING PROGRAM

## 2023-02-10 PROCEDURE — 120N000001 HC R&B MED SURG/OB

## 2023-02-10 PROCEDURE — 250N000013 HC RX MED GY IP 250 OP 250 PS 637

## 2023-02-10 PROCEDURE — 250N000009 HC RX 250: Performed by: STUDENT IN AN ORGANIZED HEALTH CARE EDUCATION/TRAINING PROGRAM

## 2023-02-10 PROCEDURE — 250N000012 HC RX MED GY IP 250 OP 636 PS 637: Performed by: STUDENT IN AN ORGANIZED HEALTH CARE EDUCATION/TRAINING PROGRAM

## 2023-02-10 PROCEDURE — 258N000003 HC RX IP 258 OP 636: Performed by: STUDENT IN AN ORGANIZED HEALTH CARE EDUCATION/TRAINING PROGRAM

## 2023-02-10 PROCEDURE — 90937 HEMODIALYSIS REPEATED EVAL: CPT

## 2023-02-10 PROCEDURE — P9045 ALBUMIN (HUMAN), 5%, 250 ML: HCPCS | Performed by: STUDENT IN AN ORGANIZED HEALTH CARE EDUCATION/TRAINING PROGRAM

## 2023-02-10 PROCEDURE — 99232 SBSQ HOSP IP/OBS MODERATE 35: CPT | Performed by: HOSPITALIST

## 2023-02-10 PROCEDURE — 250N000013 HC RX MED GY IP 250 OP 250 PS 637: Performed by: STUDENT IN AN ORGANIZED HEALTH CARE EDUCATION/TRAINING PROGRAM

## 2023-02-10 RX ADMIN — TACROLIMUS 1 MG: 5 CAPSULE ORAL at 19:50

## 2023-02-10 RX ADMIN — MIDODRINE HYDROCHLORIDE 10 MG: 5 TABLET ORAL at 08:09

## 2023-02-10 RX ADMIN — MELATONIN TAB 3 MG 3 MG: 3 TAB at 21:59

## 2023-02-10 RX ADMIN — ACETAMINOPHEN 650 MG: 325 TABLET, FILM COATED ORAL at 14:58

## 2023-02-10 RX ADMIN — NYSTATIN 500000 UNITS: 100000 SUSPENSION ORAL at 21:58

## 2023-02-10 RX ADMIN — LACTULOSE 10 G: 10 SOLUTION ORAL at 08:08

## 2023-02-10 RX ADMIN — APIXABAN 5 MG: 5 TABLET, FILM COATED ORAL at 08:09

## 2023-02-10 RX ADMIN — EPOETIN ALFA-EPBX 10000 UNITS: 10000 INJECTION, SOLUTION INTRAVENOUS; SUBCUTANEOUS at 10:12

## 2023-02-10 RX ADMIN — TACROLIMUS 1 MG: 5 CAPSULE ORAL at 08:08

## 2023-02-10 RX ADMIN — Medication 40 MG: at 17:40

## 2023-02-10 RX ADMIN — NYSTATIN 500000 UNITS: 100000 SUSPENSION ORAL at 13:14

## 2023-02-10 RX ADMIN — SODIUM CHLORIDE 300 ML: 9 INJECTION, SOLUTION INTRAVENOUS at 09:47

## 2023-02-10 RX ADMIN — LACOSAMIDE 100 MG: 10 SOLUTION ORAL at 08:09

## 2023-02-10 RX ADMIN — NYSTATIN 500000 UNITS: 100000 SUSPENSION ORAL at 08:08

## 2023-02-10 RX ADMIN — RIFAXIMIN 550 MG: 550 TABLET ORAL at 08:09

## 2023-02-10 RX ADMIN — Medication 2.5 MG: at 22:44

## 2023-02-10 RX ADMIN — Medication 40 MG: at 08:08

## 2023-02-10 RX ADMIN — ACETAMINOPHEN 650 MG: 325 TABLET, FILM COATED ORAL at 01:17

## 2023-02-10 RX ADMIN — OLANZAPINE 5 MG: 5 TABLET, ORALLY DISINTEGRATING ORAL at 01:17

## 2023-02-10 RX ADMIN — MIDODRINE HYDROCHLORIDE 10 MG: 5 TABLET ORAL at 17:42

## 2023-02-10 RX ADMIN — HEPARIN SODIUM 2000 UNITS: 1000 INJECTION INTRAVENOUS; SUBCUTANEOUS at 11:42

## 2023-02-10 RX ADMIN — OLANZAPINE 5 MG: 5 TABLET, ORALLY DISINTEGRATING ORAL at 21:59

## 2023-02-10 RX ADMIN — ALBUMIN HUMAN 250 ML: 0.05 INJECTION, SOLUTION INTRAVENOUS at 10:08

## 2023-02-10 RX ADMIN — MIDODRINE HYDROCHLORIDE 10 MG: 5 TABLET ORAL at 13:14

## 2023-02-10 RX ADMIN — APIXABAN 5 MG: 5 TABLET, FILM COATED ORAL at 21:59

## 2023-02-10 RX ADMIN — LEVETIRACETAM 1000 MG: 100 SOLUTION ORAL at 21:59

## 2023-02-10 RX ADMIN — RIFAXIMIN 550 MG: 550 TABLET ORAL at 21:59

## 2023-02-10 RX ADMIN — LACTULOSE 10 G: 10 SOLUTION ORAL at 21:58

## 2023-02-10 RX ADMIN — FOLIC ACID 1 MG: 1 TABLET ORAL at 08:09

## 2023-02-10 RX ADMIN — NYSTATIN 500000 UNITS: 100000 SUSPENSION ORAL at 17:40

## 2023-02-10 RX ADMIN — LACOSAMIDE 100 MG: 10 SOLUTION ORAL at 21:58

## 2023-02-10 RX ADMIN — HEPARIN SODIUM 2000 UNITS: 1000 INJECTION INTRAVENOUS; SUBCUTANEOUS at 11:41

## 2023-02-10 ASSESSMENT — ACTIVITIES OF DAILY LIVING (ADL)
ADLS_ACUITY_SCORE: 63
ADLS_ACUITY_SCORE: 65
ADLS_ACUITY_SCORE: 63
ADLS_ACUITY_SCORE: 61
ADLS_ACUITY_SCORE: 63

## 2023-02-10 NOTE — PROGRESS NOTES
[facility] [place]  Hospitalist Progress Note        Carl Green MD   02/10/2023        Interval History:        - Nephrology following for dialysis, last hemodialysis 2/9, being dialysed again 2/10 to get back on MWF schedule; on Midodrine for hypotension  - fluctuating mentation; AO X 2  - rectal tube in place with soft semisolid stool  -  following for disposition; patient was admitted from [facility] but declined to have him return as no longer meet [facility] guidelines         Assessment and Plan:        Blanco Osborne is a 57 year-old male admitted on 1/21/2023 as a transfer from [facility] for evaluation of loculated pleural effusion. He has extensive PMH including h/o liver transplant 2016 due to alcohol abuse, pAF on chronic anticoagulation, history of diabetes mellitus type 2, CVA, hypertension, CHRISTIAN on BiPAP.    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  He was recovering in [facility] and was transferred to [facility] for consideration of chest tube placement and possible intrapleural lysis for worsening effusion.      Acute metabolic encephalopathy most likely from hospital delirium  Underlying hepatic encephalopathy  *has had waxing and waning mentation, coinciding with lactulose being held at [facility]  *continues to be confused here and has pulled out tracheostomy tube, PICC line and pulled his dialysis catheter; currently needing sitter and mittens  - fluctuating mentation but seems improving, is alert, awake, mostly oriented apart from dates  - Re-orient as needed; Maintain normal day/night, sleep wake cycles; Minimize sedating/altering medications as able  - adjusted Lactulose to 10 mg BID (2/9) for significant diarrhea needing rectal tube; can probably discontinue  rectal tube 2/11  - Zyprexa 5 mg at bedtime scheduled.  Melatonin 3 mg p.o. at bedtime scheduled  - Olanzapine BID PRN  - mittens, sitter as needed-- can consider relaxing if remains clinically stable     Bilateral pleural effusions   Acute now chronic respiratory failure requiring tracheostomy  - resolved    Pneumonia  - resolved   ARDS  - resolved    Right pneumothorax - resolved   *Initial event was parainfluenza and strep pneumo pneumonia back in November 2022. Treated for ARDS. Had failed extubation x2 and tracheostomy performed on previous hospitalization. *Had additional complications of bilateral pneumothoraces as well as hydropneumothorax- pleural fluid grew candida parapsilosis  *Has already completed 4-week antifungal treatment. [facility].  *Recently there were concern for worsening effusion while at [facility] and had thoracentesis 01/13 which returned exudative without growth on cultures. CT chest/abdomen/pelvis on 01/18 demonstrated worsening effusions and concerns for infected parapneumonic effusions with possible SBP.  Multiple pulmonologist at [facility] reviewed CT scan recommended transfer to [facility] for consideration of chest tube placement and possible intrapleural lysis  *Thoracic surgery (Dr. Jackson) consulted during admission   *CT chest 1/22, small effusions on imaging and so chest tube was not inserted.   *Patient pulled out his tracheostomy tube on the night 2/1-2/2 and is tolerating well without increased shortness of breath persistent cough or worsening hypoxia  - Respiratory status stable on room air  - Wound care consult for tracheostomy site care      Goals of care   As per report on 1/25 nursing had mentioned that patient had refused to undergo dialysis and want to stop care, palliative care was consulted.  Patient and his spouse denied wanting to stop care and wanted ongoing restorative care including full code     ESRD  Now on HD.  No  return of renal function.  - Nephrology following, continues on M-W-F schedule      S/p Liver transplant 2016   Cirrhosis with ascites  Subacute bacterial peritonitis  *Main manifestations of this are hepatic encephalopathy and ascites.  Hepatologist at [place] felt that most likely reason for the cirrhosis was metabolic syndrome or alcohol intake.  There was no evidence of rejection on biopsy and there was some evidence of regeneration. Paracentesis done on 12/22/2022 showed lactobacillus indicating SBP, treated with Unasyn and Augmentin, finished 2-week course 1/12/2023.  Requires large-volume paracentesis periodically for recurring ascites.    *Paracentesis 1/13/2023 yielded about 7500 cc. Cultures NGTD.  Most recent paracentesis on 1/24 with 4.8 L fluid removal  - Continue intermittent paracentesis as needed if develops symptomatic ascites.   - Further treatment for recurrent ascites with TIPS deferred to his Liver Transplant team and/or Hepatology, he has an appointment on 4/21/2023 with Hepatology, Dr. Simms  - Continue Tacrolimus 1 mg BID.  - Continue rifaximin   - Lactulose dose reduced as noted above     Diarrhea  - C difficile negative. Secondary to lactulose.  - Continue rectal tube for now once rectal output slows down we will plan on removing it likely 2/11     Paroxysmal atrial fibrillation  Remains in sinus rhythm.  On anticoagulation with apixaban indefinitely for stroke prophylaxis.       Acute anemia  Pt has required intermittent transfusions for on-going anemia.   - Anemia most likely secondary to critical illness/chronic disease, chronic renal disease  - Hemoglobin stable, 8.4 g/dl 2/6  - Transfuse packed red blood cells to keep Hb> 7     History PEA arrest   PEA arrest prior to his previous admission and 1 episode of PEA associated with desaturation on 12/20.  No structural cardiac disease.   - Continue Cardiac Monitoring     Hypotension  Also has developed baseline hypotension 2/2 cirrhosis  and prolonged critical illness.   - Continue midodrine       H/o Seizure after hypoxic event  This is in the context of baseline multifactorial encephalopathy secondary to his ongoing critical illness and prior cardiac arrests and prior strokes and cirrhosis with hepatic encephalopathy. MRI of head of 12/20/22 reveals no PRES or leptomeningeal enhancement but scattered.  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   - Continue levetiracetam, lacosamide       Diabetes mellitus type 2  *Hemoglobin A1c on November 2022 was 4.7  *PTA looks like was on 30 units of Lantus every morning, currently only on MDSSI  - Off blood sugar checks and sliding scale insulin as has been stable without insulin needs     Severe malnutrition, protein and calorie type  Moderate dysphagia.  - Continue with tube feeds via PEG tube  - IR replaced the PEG tube on 2/8/2023  - Nutritionist consulted and following for tube feeds  - SLP following as well     Anxiety  Fluoxetine was discontinued as per psychiatry recommendation on 2/2 (see consult note for details)         Clinically Significant Risk Factors                 # Hypoalbuminemia: Lowest albumin = 1.9 g/dL at 1/23/2023  6:38 AM, will monitor as appropriate            # Severe Malnutrition: based on nutrition assessment     Diet: Room Service  Adult Formula Bolus Feeding: Novasource Renal; Route: Gastrostomy; 3 times daily; Volume per Bolus: 240; mL(s); 80 mL/hr x 3 hrs back up bolus feeding after each meal ONLY IF INTAKE <50% of meal  Snacks/Supplements Adult: Other; 4 oz Ensure with breakfast, Gelatein+ with lunch and magic cup with dinner (RD); With Meals  Soft & Bite Sized Diet (level 6) Liquidized/Moderately Thick (level 3) (By tsp only); No Straws      DVT Prophylaxis: on Eliquis  Code Status: Full Code          Disposition Plan        Expected Discharge Date: likely 02/14/2023 to TCU; PAULIE following for disposition  - currently  needing sitter/mittens    Clinically Significant Risk Factors              # Hypoalbuminemia: Lowest albumin = 1.9 g/dL at 1/23/2023  6:38 AM, will monitor as appropriate            # Severe Malnutrition: based on nutrition assessment        Page Me (7 am to 6 pm)              Physical Exam:      Blood pressure (!) 81/54, pulse 90, temperature 97.5  F (36.4  C), temperature source Axillary, resp. rate 16, weight 84.6 kg (186 lb 8.2 oz), SpO2 91 %.  Vitals:    01/30/23 0500 01/31/23 0510 02/10/23 0425   Weight: 86.4 kg (190 lb 7.6 oz) 84.5 kg (186 lb 4.6 oz) 84.6 kg (186 lb 8.2 oz)     Vital Signs with Ranges  Temp:  [97.2  F (36.2  C)-98.7  F (37.1  C)] 97.5  F (36.4  C)  Pulse:  [66-90] 90  Resp:  [16-30] 16  BP: ()/(46-73) 81/54  SpO2:  [91 %-99 %] 91 %  I/O's Last 24 hours  I/O last 3 completed shifts:  In: 180 [NG/GT:180]  Out: 750 [Other:400; Stool:350]    Constitutional: Alert, awake and oriented X 2, confused with dates; resting comfortably in no apparent distress       Oral cavity: Moist mucosa   Cardiovascular: Normal s1 s2, regular rate and rhythm, no murmur   Lungs: B/l clear to auscultation, no wheezes or crepitations   Abdomen: Soft, nt, nd, no guarding, rigidity or rebound; BS +   LE : No edema   Musculoskeletal:  moving all extremities equally ; mittens on   Neuro: No focal neurological deficits noted   Psychiatry: normal mood and affect  rectal tube noted with soft semisolid stool                Medications:          - MEDICATION INSTRUCTIONS for Dialysis Patients -   Does not apply See Admin Instructions     sodium chloride 0.9%  300 mL Hemodialysis Machine Once     sodium chloride 0.9%  300 mL Hemodialysis Machine Once     albumin human  250 mL Intravenous Once in dialysis/CRRT     apixaban ANTICOAGULANT  5 mg Oral BID     epoetin mirta-epbx  10,000 Units Intravenous Once in dialysis/CRRT     folic acid  1 mg Oral or Feeding Tube Daily     sodium chloride (PF) 0.9%  10 mL Intracatheter Once in  dialysis/CRRT    Followed by     heparin  1.3-2.6 mL Intracatheter Once in dialysis/CRRT     sodium chloride (PF) 0.9%  10 mL Intracatheter Once in dialysis/CRRT    Followed by     heparin  1.3-2.6 mL Intracatheter Once in dialysis/CRRT     sodium chloride (PF) 0.9%  10 mL Intracatheter Once in dialysis/CRRT    Followed by     heparin  1.3-2.6 mL Intracatheter Once in dialysis/CRRT     sodium chloride (PF) 0.9%  10 mL Intracatheter Once in dialysis/CRRT    Followed by     heparin  1.3-2.6 mL Intracatheter Once in dialysis/CRRT     lacosamide  100 mg Oral or Feeding Tube BID     lactulose  10 g Oral BID     levETIRAcetam  1,000 mg Oral or Feeding Tube Q24H     lidocaine  1 patch Transdermal Q24H     lidocaine   Transdermal Q8H BIJU     melatonin  3 mg Oral At Bedtime     midodrine  10 mg Oral or Feeding Tube TID w/meals     mineral oil-hydrophilic petrolatum   Topical Daily     - MEDICATION INSTRUCTIONS -   Does not apply Once     nystatin  500,000 Units Swish & Spit 4x Daily     OLANZapine zydis  5 mg Oral At Bedtime     pantoprazole  40 mg Oral or Feeding Tube BID AC     rifaximin  550 mg Oral or Feeding Tube BID     sodium chloride (PF)  10-30 mL Intracatheter Q8H     sodium chloride (PF)  3 mL Intracatheter Q8H     tacrolimus  1 mg Oral BID IS     PRN Meds: sodium chloride 0.9%, sodium chloride 0.9%, acetaminophen **OR** acetaminophen, acetylcysteine, albuterol, calcium carbonate, artificial tears, glucose **OR** dextrose **OR** glucagon, ipratropium - albuterol 0.5 mg/2.5 mg/3 mL, lidocaine 4%, lidocaine (buffered or not buffered), melatonin, mineral oil-hydrophilic petrolatum, naloxone **OR** naloxone **OR** naloxone **OR** naloxone, nitroGLYcerin, OLANZapine zydis, ondansetron **OR** ondansetron, oxyCODONE, simethicone, sodium chloride (PF), urea         Data:      All new lab and imaging data was reviewed.   Recent Labs   Lab Test 02/06/23  0600 01/31/23  1527 01/25/23  0522 12/22/22  1108 12/21/22  1315  12/17/22  0519 12/16/22  2239 12/16/22  1620   WBC 5.7 11.5* 10.7   < > 3.4*   < > 3.9* 4.5   HGB 8.4* 7.7* 8.5*   < > 8.0*   < > 7.9*  7.9* 7.0*   * 103* 100   < > 102*   < > 106* 107*    164 230   < > 75*   < > 94* 111*   INR  --   --   --   --  1.36*  --  1.14 1.58*    < > = values in this interval not displayed.      Recent Labs   Lab Test 02/06/23  0647 02/06/23  0600 02/05/23  1203 01/30/23  0758 01/30/23  0512 01/28/23  0733 01/28/23  0609   NA  --  140  --   --  139  --  137   POTASSIUM  --  3.8  --   --  4.3  --  4.5   CHLORIDE  --  96*  --   --  96*  --  97*   CO2  --  30*  --   --  30*  --  32*   BUN  --  44.1*  --   --  45.2*  --  23.6*   CR  --  3.97*  --   --  3.99*  --  2.54*   ANIONGAP  --  14  --   --  13  --  8   KELLY  --  9.5  --   --  9.2  --  8.6   * 120* 131*   < > 111*   < > 93    < > = values in this interval not displayed.     No lab results found.    Invalid input(s): TROP, TROPONINIES

## 2023-02-10 NOTE — PROGRESS NOTES
Potassium   Date Value Ref Range Status   02/06/2023 3.8 3.4 - 5.3 mmol/L Final   12/29/2022 3.4 3.4 - 5.3 mmol/L Final   04/20/2020 3.9 3.4 - 5.3 mmol/L Final     Potassium POCT   Date Value Ref Range Status   01/19/2023 3.6 3.5 - 5.0 mmol/L Final     Hemoglobin   Date Value Ref Range Status   02/06/2023 8.4 (L) 13.3 - 17.7 g/dL Final   04/20/2020 14.3 13.3 - 17.7 g/dL Final     Creatinine   Date Value Ref Range Status   02/06/2023 3.97 (H) 0.67 - 1.17 mg/dL Final   04/20/2020 1.06 0.66 - 1.25 mg/dL Final     Urea Nitrogen   Date Value Ref Range Status   02/06/2023 44.1 (H) 6.0 - 20.0 mg/dL Final   12/29/2022 46 (H) 7 - 30 mg/dL Final   04/20/2020 23 7 - 30 mg/dL Final     Sodium   Date Value Ref Range Status   02/06/2023 140 136 - 145 mmol/L Final   04/20/2020 139 133 - 144 mmol/L Final     INR   Date Value Ref Range Status   12/21/2022 1.36 (H) 0.85 - 1.15 Final   10/07/2019 1.20 (H) 0.86 - 1.14 Final       DIALYSIS PROCEDURE NOTE  Hepatitis status of previous patient on machine log was checked and verified ok to use with this patients hepatitis status.  Patient dialyzed for 3 hrs. on a K3 bath with a net fluid removal of  0.5L.  A BFR of 300-350 ml/min was obtained via a RIJ catheter.    The treatment plan was discussed with Dr. Mariano during the treatment.    Total heparin received during the treatment: 0 units.   Line flushed, clamped and capped with heparin 1:1000 2 mL (2000 units) per lumen    Meds  given: EPO 10,000units IV   Complications: arterial collapse blood lines reversed. Md aware of low flows with the catheter. Pt repositioned.      Person educated: pt. Knowledge base minimal. Barriers to learning: confusion will readdress later.    ICEBOAT? Timeout performed pre-treatment  I: Patient was identified using 2 identifiers  C:  Consent Signed Yes  E: Equipment preventative maintenance is current and dialysis delivery system OK to use  B: Hepatitis B Surface Antigen: neg; Draw Date: 2/2/23       Hepatitis B Surface Antibody: tommie; Draw Date: 2/2/23  O: Dialysis orders present and complete prior to treatment  A: Vascular access verified and assessed prior to treatment  T: Treatment was performed at a clinically appropriate time  ?: Patient was allowed to ask questions and address concerns prior to treatment  See Adult Hemodialysis flowsheet in [facility] for further details and post assessment.  Machine water alarm in place and functioning. Transducer pods intact and checked every 15min.   Pt returned via bed.  Chlorine/Chloramine water system checked every 4 hours.  Outpatient Dialysis at [facility]    Patient repositioned every 2 hours during the treatment.  Post treatment report given to NIA Morales RN regarding 0.5L of fluid removed, last BP of 122/74, and patient pain rating of 0/10.

## 2023-02-10 NOTE — PLAN OF CARE
A&Ox2, confused. D/t time &situation. Able to communicate by Whispers. VSS except soft BP. Lung sounds WNL. Restless and anxious. PRN Zyprexa given. .mitts on,Sitter at bedside. Oxycodone and tylenol given for pain. PIV in RUE, SL. CVC to R chest wall. Peg tube clamped dressing CDI. Rectal tube in place. Old trach site dressing CDI. Scattered bruising,redness to buttocks. Sleep quality: restless.  Plan: dialysis today.

## 2023-02-10 NOTE — PROGRESS NOTES
Assessment and Plan:   ESRD: LORETTA and non-recovery. Dialysis today for UF and clearance. Running MWF with R CVC. On midodrine for hypotension.   3h, 400 BFR, no UF, K protocol, 35 HCO3 140 Na. High dose EPO. Albumin prime. No heparin.               Interval History:   Anemia: on EPO. On folate.   S/P liver transplant: on tacrolimus,   Encephalopathy: delerium  DM                   Review of Systems:   No conversation.           Medications:       - MEDICATION INSTRUCTIONS for Dialysis Patients -   Does not apply See Admin Instructions     sodium chloride 0.9%  300 mL Hemodialysis Machine Once     sodium chloride 0.9%  300 mL Hemodialysis Machine Once     albumin human  250 mL Intravenous Once in dialysis/CRRT     apixaban ANTICOAGULANT  5 mg Oral BID     epoetin mirta-epbx  10,000 Units Intravenous Once in dialysis/CRRT     folic acid  1 mg Oral or Feeding Tube Daily     sodium chloride (PF) 0.9%  10 mL Intracatheter Once in dialysis/CRRT    Followed by     heparin  1.3-2.6 mL Intracatheter Once in dialysis/CRRT     sodium chloride (PF) 0.9%  10 mL Intracatheter Once in dialysis/CRRT    Followed by     heparin  1.3-2.6 mL Intracatheter Once in dialysis/CRRT     sodium chloride (PF) 0.9%  10 mL Intracatheter Once in dialysis/CRRT    Followed by     heparin  1.3-2.6 mL Intracatheter Once in dialysis/CRRT     sodium chloride (PF) 0.9%  10 mL Intracatheter Once in dialysis/CRRT    Followed by     heparin  1.3-2.6 mL Intracatheter Once in dialysis/CRRT     lacosamide  100 mg Oral or Feeding Tube BID     lactulose  10 g Oral BID     levETIRAcetam  1,000 mg Oral or Feeding Tube Q24H     lidocaine  1 patch Transdermal Q24H     lidocaine   Transdermal Q8H [facility]     melatonin  3 mg Oral At Bedtime     midodrine  10 mg Oral or Feeding Tube TID w/meals     mineral oil-hydrophilic petrolatum   Topical Daily     - MEDICATION INSTRUCTIONS -   Does not apply Once     nystatin  500,000 Units Swish & Spit 4x Daily     OLANZapine  zydis  5 mg Oral At Bedtime     pantoprazole  40 mg Oral or Feeding Tube BID AC     rifaximin  550 mg Oral or Feeding Tube BID     sodium chloride (PF)  10-30 mL Intracatheter Q8H     sodium chloride (PF)  3 mL Intracatheter Q8H     tacrolimus  1 mg Oral BID IS         Current active medications and PTA medications reviewed, see medication list for details.            Physical Exam:   Vitals were reviewed  Patient Vitals for the past 24 hrs:   BP Temp Temp src Pulse Resp SpO2 Weight   02/10/23 0739 (!) 81/54 97.5  F (36.4  C) Axillary 90 16 91 % --   02/10/23 0425 -- -- -- -- -- -- 84.6 kg (186 lb 8.2 oz)   23 2044 101/70 98.7  F (37.1  C) Oral -- -- 91 % --   23 1848 94/61 -- -- -- -- -- --   23 1534 (!) 85/63 -- -- -- -- -- --   23 1521 (!) 77/55 97.6  F (36.4  C) Oral 66 18 91 % --   23 1200 109/73 97.2  F (36.2  C) Axillary 83 20 99 % --   23 1130 118/71 97.5  F (36.4  C) Axillary 81 28 -- --   23 1115 118/46 -- -- 83 28 -- --   23 1100 110/60 -- -- 81 22 -- --   23 1045 115/65 -- -- 83 22 -- --   23 1030 111/65 -- -- 85 29 -- --   23 1015 108/64 -- -- 89 25 -- --   23 1000 101/58 -- -- 83 25 -- --   23 0945 100/67 -- -- 88 17 -- --   23 0935 (!) 89/57 -- -- 83 20 -- --   23 0930 (!) 72/55 -- -- 85 24 -- --   23 0915 (!) 83/50 -- -- 87 29 -- --       Temp:  [97.2  F (36.2  C)-98.7  F (37.1  C)] 97.5  F (36.4  C)  Pulse:  [66-90] 90  Resp:  [16-29] 16  BP: ()/(46-73) 81/54  SpO2:  [91 %-99 %] 91 %    Temperatures:  Current - Temp: 97.5  F (36.4  C); Max - Temp  Av.7  F (36.5  C)  Min: 97.2  F (36.2  C)  Max: 98.7  F (37.1  C)  Respiration range: Resp  Av.1  Min: 16  Max: 29  Pulse range: Pulse  Av.4  Min: 66  Max: 90  Blood pressure range: Systolic (24hrs), Av , Min:72 , Max:118   ; Diastolic (24hrs), Av, Min:46, Max:73    Pulse oximetry range: SpO2  Av %  Min: 91 %  Max: 99 %    I/O  last 3 completed shifts:  In: 180 [NG/GT:180]  Out: 750 [Other:400; Stool:350]      Intake/Output Summary (Last 24 hours) at 2/10/2023 0908  Last data filed at 2/10/2023 0100  Gross per 24 hour   Intake 120 ml   Output 750 ml   Net -630 ml       Tremulous, somewhat confused, restraint mitts in place  Lungs with clear ant BS  R CVC with clean exit site  Cor RRR nl S1 S2 no M  LE no edema  Abd: distended and somewhat firm, non-tender       Wt Readings from Last 4 Encounters:   02/10/23 84.6 kg (186 lb 8.2 oz)   01/21/23 82.4 kg (181 lb 11.2 oz)   12/28/22 96 kg (211 lb 10.3 oz)   12/16/22 100.2 kg (221 lb)          Data:          Lab Results   Component Value Date     02/06/2023     01/30/2023     01/28/2023     04/20/2020     10/07/2019     02/20/2019    Lab Results   Component Value Date    CHLORIDE 96 02/06/2023    CHLORIDE 96 01/30/2023    CHLORIDE 97 01/28/2023    CHLORIDE 103 12/29/2022    CHLORIDE 103 12/28/2022    CHLORIDE 102 12/27/2022    CHLORIDE 105 08/05/2020    CHLORIDE 106 04/20/2020    CHLORIDE 99 10/07/2019    CHLORIDE 108 08/01/2019    CHLORIDE 106 02/20/2019    Lab Results   Component Value Date    BUN 44.1 02/06/2023    BUN 45.2 01/30/2023    BUN 23.6 01/28/2023    BUN 46 12/29/2022    BUN 62 12/28/2022    BUN 52 12/27/2022    BUN 23 04/20/2020    BUN 18 10/07/2019    BUN 20 02/20/2019      Lab Results   Component Value Date    POTASSIUM 3.8 02/06/2023    POTASSIUM 4.3 01/30/2023    POTASSIUM 4.5 01/28/2023    POTASSIUM 3.6 01/19/2023    POTASSIUM  01/18/2023      Comment:      Disregard results.      POTASSIUM 3.6 01/17/2023    POTASSIUM 3.4 12/29/2022    POTASSIUM 3.7 12/28/2022    POTASSIUM 3.7 12/28/2022    POTASSIUM 3.9 04/20/2020    POTASSIUM 4.3 10/07/2019    POTASSIUM 4.2 02/20/2019    Lab Results   Component Value Date    CO2 30 02/06/2023    CO2 30 01/30/2023    CO2 32 01/28/2023    CO2 31 12/29/2022    CO2 29 12/28/2022    CO2 29 12/27/2022    CO2 28  04/20/2020    CO2 26 10/07/2019    CO2 24 02/20/2019    Lab Results   Component Value Date    CR 3.97 02/06/2023    CR 3.99 01/30/2023    CR 2.54 01/28/2023    CR 1.06 04/20/2020    CR 1.01 10/07/2019    CR 1.08 02/20/2019        Recent Labs   Lab Test 02/06/23  0600 01/31/23  1527 01/25/23  0522   WBC 5.7 11.5* 10.7   HGB 8.4* 7.7* 8.5*   HCT 30.1* 26.7* 29.1*   * 103* 100    164 230     Recent Labs   Lab Test 01/31/23  1616 01/28/23  0609 01/22/23  0528 01/19/23  0627 12/21/22  0405 12/20/22  1227   AST  --  19 24 20   < >  --    ALT  --  5* 9* 8*   < >  --    ALKPHOS  --  235* 260* 236*   < >  --    BILITOTAL  --  0.6 0.5 0.5   < >  --    CHANDLER 44  --   --  50  --  32    < > = values in this interval not displayed.       Recent Labs   Lab Test 02/06/23  0600 01/30/23  0512 01/23/23  0638   MAG 1.9 1.9 2.1     Recent Labs   Lab Test 02/06/23  0600 01/30/23  0512 01/23/23  0638   PHOS 4.5 4.8* 4.5     Recent Labs   Lab Test 02/06/23  0600 01/30/23  0512 01/28/23  0609   KELLY 9.5 9.2 8.6       Lab Results   Component Value Date    KELLY 9.5 02/06/2023     Lab Results   Component Value Date    WBC 5.7 02/06/2023    HGB 8.4 (L) 02/06/2023    HCT 30.1 (L) 02/06/2023     (H) 02/06/2023     02/06/2023     Lab Results   Component Value Date     02/06/2023    POTASSIUM 3.8 02/06/2023    CHLORIDE 96 (L) 02/06/2023    CO2 30 (H) 02/06/2023     (H) 02/06/2023     Lab Results   Component Value Date    BUN 44.1 (H) 02/06/2023    CR 3.97 (H) 02/06/2023     Lab Results   Component Value Date    MAG 1.9 02/06/2023     Lab Results   Component Value Date    PHOS 4.5 02/06/2023       Creatinine   Date Value Ref Range Status   02/06/2023 3.97 (H) 0.67 - 1.17 mg/dL Final   01/30/2023 3.99 (H) 0.67 - 1.17 mg/dL Final   01/28/2023 2.54 (H) 0.67 - 1.17 mg/dL Final   01/25/2023 3.26 (H) 0.67 - 1.17 mg/dL Final   01/24/2023 2.49 (H) 0.67 - 1.17 mg/dL Final   01/23/2023 3.48 (H) 0.67 - 1.17 mg/dL Final    04/20/2020 1.06 0.66 - 1.25 mg/dL Final   10/07/2019 1.01 0.66 - 1.25 mg/dL Final   02/20/2019 1.08 0.66 - 1.25 mg/dL Final   07/10/2018 1.32 (H) 0.66 - 1.25 mg/dL Final   10/31/2017 1.18 0.66 - 1.25 mg/dL Final   10/17/2017 1.26 (H) 0.66 - 1.25 mg/dL Final       Attestation:  I have reviewed today's vital signs, notes, medications, labs and imaging.  Seen on dialysis.     Elvis Mariano MD

## 2023-02-10 NOTE — CARE PLAN
Alert to self only. BP on the low side after dialysis, other VSS. No signs of pain. Ate 75 % of lunch, and 25% of dinner. TF currently running. Trach site dressing changed Dialysis run today. Plan for tomorrow as well. Rectal tube in place.  Up with lift. Plan to continue to monitor tonight.

## 2023-02-10 NOTE — CONSULTS
Care Management Follow Up    Length of Stay (days): 20    Expected Discharge Date: 02/14/2023     Concerns to be Addressed: discharge planning     Patient plan of care discussed at interdisciplinary rounds: Yes    Anticipated Discharge Disposition: TBD     Anticipated Discharge Services:    Anticipated Discharge DME:      Patient/family educated on Medicare website which has current facility and service quality ratings:    Education Provided on the Discharge Plan:    Patient/Family in Agreement with the Plan: yes    Referrals Placed by CM/SW:    Private pay costs discussed: Not applicable    Additional Information:  New CM/SW consult received for discharge planning. Please note this consult has been addressed. See notes dated 1/25 and 1/31. Pt not appropriate for discharge planning at this time as he continues with a sitter and mitts and nursing continues to report restlessness and agitation. Pt admitted from [facility] but they have already declined to have him return as he no longer meet [facility] guidelines. SW/CM will continue to follow for discharge planning. Dr Lott updated yesterday.       JAMISON Evans, LGSW  [phone] Desk phone  [phone] Cell/text (Preferred)  [facility]

## 2023-02-11 PROCEDURE — 250N000013 HC RX MED GY IP 250 OP 250 PS 637: Performed by: INTERNAL MEDICINE

## 2023-02-11 PROCEDURE — 250N000013 HC RX MED GY IP 250 OP 250 PS 637

## 2023-02-11 PROCEDURE — 99232 SBSQ HOSP IP/OBS MODERATE 35: CPT | Performed by: HOSPITALIST

## 2023-02-11 PROCEDURE — 250N000012 HC RX MED GY IP 250 OP 636 PS 637: Performed by: STUDENT IN AN ORGANIZED HEALTH CARE EDUCATION/TRAINING PROGRAM

## 2023-02-11 PROCEDURE — 250N000013 HC RX MED GY IP 250 OP 250 PS 637: Performed by: STUDENT IN AN ORGANIZED HEALTH CARE EDUCATION/TRAINING PROGRAM

## 2023-02-11 PROCEDURE — 120N000001 HC R&B MED SURG/OB

## 2023-02-11 PROCEDURE — 250N000009 HC RX 250: Performed by: STUDENT IN AN ORGANIZED HEALTH CARE EDUCATION/TRAINING PROGRAM

## 2023-02-11 RX ADMIN — RIFAXIMIN 550 MG: 550 TABLET ORAL at 10:16

## 2023-02-11 RX ADMIN — LACTULOSE 10 G: 10 SOLUTION ORAL at 20:42

## 2023-02-11 RX ADMIN — MELATONIN TAB 3 MG 3 MG: 3 TAB at 21:00

## 2023-02-11 RX ADMIN — TACROLIMUS 1 MG: 5 CAPSULE ORAL at 20:41

## 2023-02-11 RX ADMIN — APIXABAN 5 MG: 5 TABLET, FILM COATED ORAL at 20:41

## 2023-02-11 RX ADMIN — ACETAMINOPHEN 650 MG: 325 TABLET, FILM COATED ORAL at 11:50

## 2023-02-11 RX ADMIN — RIFAXIMIN 550 MG: 550 TABLET ORAL at 20:41

## 2023-02-11 RX ADMIN — TACROLIMUS 1 MG: 5 CAPSULE ORAL at 10:16

## 2023-02-11 RX ADMIN — NYSTATIN 500000 UNITS: 100000 SUSPENSION ORAL at 17:43

## 2023-02-11 RX ADMIN — MIDODRINE HYDROCHLORIDE 10 MG: 5 TABLET ORAL at 10:16

## 2023-02-11 RX ADMIN — LACTULOSE 10 G: 10 SOLUTION ORAL at 10:16

## 2023-02-11 RX ADMIN — APIXABAN 5 MG: 5 TABLET, FILM COATED ORAL at 10:17

## 2023-02-11 RX ADMIN — ACETAMINOPHEN 650 MG: 325 TABLET, FILM COATED ORAL at 03:39

## 2023-02-11 RX ADMIN — LACOSAMIDE 100 MG: 10 SOLUTION ORAL at 10:16

## 2023-02-11 RX ADMIN — SIMETHICONE 80 MG: 80 TABLET, CHEWABLE ORAL at 14:21

## 2023-02-11 RX ADMIN — Medication 40 MG: at 10:15

## 2023-02-11 RX ADMIN — NYSTATIN 500000 UNITS: 100000 SUSPENSION ORAL at 12:38

## 2023-02-11 RX ADMIN — MIDODRINE HYDROCHLORIDE 10 MG: 5 TABLET ORAL at 17:43

## 2023-02-11 RX ADMIN — OLANZAPINE 5 MG: 5 TABLET, ORALLY DISINTEGRATING ORAL at 21:00

## 2023-02-11 RX ADMIN — NYSTATIN 500000 UNITS: 100000 SUSPENSION ORAL at 21:00

## 2023-02-11 RX ADMIN — FOLIC ACID 1 MG: 1 TABLET ORAL at 10:16

## 2023-02-11 RX ADMIN — LACOSAMIDE 100 MG: 10 SOLUTION ORAL at 20:44

## 2023-02-11 RX ADMIN — Medication 40 MG: at 17:43

## 2023-02-11 RX ADMIN — LEVETIRACETAM 1000 MG: 100 SOLUTION ORAL at 21:44

## 2023-02-11 RX ADMIN — NYSTATIN 500000 UNITS: 100000 SUSPENSION ORAL at 10:16

## 2023-02-11 RX ADMIN — MIDODRINE HYDROCHLORIDE 10 MG: 5 TABLET ORAL at 12:38

## 2023-02-11 ASSESSMENT — ACTIVITIES OF DAILY LIVING (ADL)
ADLS_ACUITY_SCORE: 59
ADLS_ACUITY_SCORE: 59
ADLS_ACUITY_SCORE: 63
ADLS_ACUITY_SCORE: 63
ADLS_ACUITY_SCORE: 59
ADLS_ACUITY_SCORE: 63
ADLS_ACUITY_SCORE: 59

## 2023-02-11 NOTE — PLAN OF CARE
A&O to self.VSS on RA, BP soft.Lung Sounds diminished.Bowel Sounds active, rectal tube in place, leaking occasionally.pt on dialysis little urine made today. Pt has old trach site, dressing change CDI, mepilex to coccyx CDI, mepilex to right elbow skin tear CDI, skin tear to right chest mepilex in place CDI, scattered bruising and scabbing.up assist 2 with lift. PRN tylenol given once for pain. Tolerating soft mod thick diet, appetite poor, bolus TF given twice. Sitter at bedside for safety, pt pulling at lines.

## 2023-02-11 NOTE — PLAN OF CARE
A&Ox1. VSS on RA, BP soft at times, on midodrine. LS diminished. BS active, with rectal tube, scant leaking noted.  With low to no UO, on HD. HD cath to R chest intact. R PIV SL. Pain controlled by prn oxycodone and tylenol. Continues with diet. PEG tube clamped, intact. Sitter at bedside. Continues with wound care.

## 2023-02-11 NOTE — PLAN OF CARE
A+O to self only. VSS ex soft BP. On room air. Lung sounds diminished. Old trach site dressing CDI. Bowels active, flatus+ rectal tube in place, small amount of leakage. No void, on dialysis. Turned q2h, on pulsate. Oxy given for pain. Up w/ lift. Mepilex to L elbow, coccyx mepilex CDI. PEG tube clamped. Dialysis cath to R chest. Tolerating diet.

## 2023-02-11 NOTE — PROGRESS NOTES
[facility]  Hospitalist Progress Note        Carl Green MD   02/11/2023        Interval History:        - Mentation remains stable at this time, seems oriented apart from slight disorientation to situation; mittens d/zelda; sitter in place; can probably have a trial off sitter in 1-2 days  - was dialyzed on 2/10  - rectal tube in place with soft semisolid stool; will continue until stool more formed         Assessment and Plan:        Blanco Osborne is a 57 year-old male admitted on 1/21/2023 as a transfer from [facility] for evaluation of loculated pleural effusion. He has extensive PMH including h/o liver transplant 2016 due to alcohol abuse, pAF on chronic anticoagulation, history of diabetes mellitus type 2, CVA, hypertension, CHRISTIAN on BiPAP.    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  He was recovering in [facility] and was transferred to [facility] for consideration of chest tube placement and possible intrapleural lysis for worsening effusion.      Acute metabolic encephalopathy most likely from hospital delirium  Underlying hepatic encephalopathy  *has had waxing and waning mentation, coinciding with lactulose being held at [facility]  *earlier in hospital stay, remained confused and had pulled out tracheostomy tube, PICC line and pulled his dialysis catheter  - fluctuating mentation but seems improving, is alert, awake, mostly oriented with some disorientation to situation  - Re-orient as needed; Maintain normal day/night, sleep wake cycles; Minimize sedating/altering medications as able  - adjusted Lactulose to 10 mg BID (2/9) for significant diarrhea needing rectal tube; will continue until stools more formed  - Zyprexa 5 mg at bedtime scheduled.  Melatonin 3 mg p.o. at bedtime  scheduled  - Olanzapine BID PRN   - mittens d/zelda, sitter as needed-- can probably have a trial off sitter in 1-2 days     Bilateral pleural effusions   Acute now chronic respiratory failure requiring tracheostomy  - resolved    Pneumonia  - resolved   ARDS  - resolved    Right pneumothorax - resolved   *Initial event was parainfluenza and strep pneumo pneumonia back in November 2022. Treated for ARDS. Had failed extubation x2 and tracheostomy performed on previous hospitalization. *Had additional complications of bilateral pneumothoraces as well as hydropneumothorax- pleural fluid grew candida parapsilosis  *Has already completed 4-week antifungal treatment. While in LTACH he was successfully weaned from the ventilator.  *Recently there were concern for worsening effusion while at [facility] and had thoracentesis 01/13 which returned exudative without growth on cultures. CT chest/abdomen/pelvis on 01/18 demonstrated worsening effusions and concerns for infected parapneumonic effusions with possible SBP.  Multiple pulmonologist at [facility] reviewed CT scan recommended transfer to [facility] for consideration of chest tube placement and possible intrapleural lysis  *Thoracic surgery (Dr. Jackson) consulted during admission   *CT chest 1/22, small effusions on imaging and so chest tube was not inserted.   *Patient pulled out his tracheostomy tube on the night 2/1-2/2 and is tolerating well without increased shortness of breath persistent cough or worsening hypoxia  - Respiratory status stable on room air  - Wound care consult for tracheostomy site care      Goals of care   As per report on 1/25 nursing had mentioned that patient had refused to undergo dialysis and want to stop care, palliative care was consulted.  Patient and his spouse denied wanting to stop care and wanted ongoing restorative care including full code     ESRD  Now on HD.  No return of renal function.  - Nephrology following, continues on M-W-F schedule; last  dialyzed 2/10      S/p Liver transplant 2016   Cirrhosis with ascites  Subacute bacterial peritonitis  *Main manifestations of this are hepatic encephalopathy and ascites.  Hepatologist at [place] felt that most likely reason for the cirrhosis was metabolic syndrome or alcohol intake.  There was no evidence of rejection on biopsy and there was some evidence of regeneration. Paracentesis done on 12/22/2022 showed lactobacillus indicating SBP, treated with Unasyn and Augmentin, finished 2-week course 1/12/2023.  Requires large-volume paracentesis periodically for recurring ascites.    *Paracentesis 1/13/2023 yielded about 7500 cc. Cultures NGTD.  Most recent paracentesis on 1/24 with 4.8 L fluid removal  - Continue intermittent paracentesis as needed if develops symptomatic ascites.   - Further treatment for recurrent ascites with TIPS deferred to his Liver Transplant team and/or Hepatology, he has an appointment on 4/21/2023 with Hepatology, Dr. Simms  - Continue Tacrolimus 1 mg BID.  - Continue rifaximin   - Lactulose dose reduced as noted above     Diarrhea  - C difficile negative opn 1/31. Secondary to lactulose.  - Continue rectal tube for now and plan to remove once stools more formed     Paroxysmal atrial fibrillation  Remains in sinus rhythm.  On anticoagulation with apixaban indefinitely for stroke prophylaxis.       Acute anemia  Pt has required intermittent transfusions for on-going anemia.   - Anemia most likely secondary to critical illness/chronic disease, chronic renal disease  - baseline around 8; Hemoglobin stable, 8.4 g/dl 2/6  - Transfuse packed red blood cells to keep Hb> 7     History PEA arrest   PEA arrest prior to his previous admission and 1 episode of PEA associated with desaturation on 12/20.  No structural cardiac disease.   - Continue Cardiac Monitoring     Chronic Hypotension  - has developed baseline hypotension 2/2 cirrhosis and prolonged critical illness.   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 MRI of head of 12/20/22 reveals no PRES or leptomeningeal enhancement but scattered.  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   - Continue levetiracetam, lacosamide       Diabetes mellitus type 2  *Hemoglobin A1c on November 2022 was 4.7  *PTA looks like was on 30 units of Lantus every morning ; was put on sliding scale insulin here   - BG checks and sliding scale insulin discontinued as has been stable without insulin needs     Severe malnutrition, protein and calorie type  Moderate dysphagia.  - Continue with tube feeds via PEG tube  - IR replaced the PEG tube on 2/8/2023  - Nutritionist consulted and following for tube feeds  - SLP following as well     Anxiety  Fluoxetine was discontinued as per psychiatry recommendation on 2/2 (see consult note for details)         Clinically Significant Risk Factors                 # Hypoalbuminemia: Lowest albumin = 1.9 g/dL at 1/23/2023  6:38 AM, will monitor as appropriate            # Severe Malnutrition: based on nutrition assessment     Diet: Room Service  Adult Formula Bolus Feeding: Novasource Renal; Route: Gastrostomy; 3 times daily; Volume per Bolus: 240; mL(s); 80 mL/hr x 3 hrs back up bolus feeding after each meal ONLY IF INTAKE <50% of meal  Snacks/Supplements Adult: Other; 4 oz Ensure with breakfast, Gelatein+ with lunch and magic cup with dinner (RD); With Meals  Soft & Bite Sized Diet (level 6) Liquidized/Moderately Thick (level 3) (By tsp only); No Straws      DVT Prophylaxis: on Eliquis  Code Status: Full Code          Disposition Plan        Expected Discharge Date: likely 02/14/2023 to TCU;  following for disposition    Clinically Significant Risk Factors              #  Hypoalbuminemia: Lowest albumin = 1.9 g/dL at 1/23/2023  6:38 AM, will monitor as appropriate            # Severe Malnutrition: based on nutrition assessment        Page Me (7 am to 6 pm)              Physical Exam:      Blood pressure 98/67, pulse 92, temperature 97.6  F (36.4  C), temperature source Oral, resp. rate 18, weight 86 kg (189 lb 9.5 oz), SpO2 93 %.  Vitals:    01/31/23 0510 02/10/23 0425 02/11/23 0339   Weight: 84.5 kg (186 lb 4.6 oz) 84.6 kg (186 lb 8.2 oz) 86 kg (189 lb 9.5 oz)     Vital Signs with Ranges  Temp:  [97.6  F (36.4  C)-98.5  F (36.9  C)] 97.6  F (36.4  C)  Pulse:  [57-99] 92  Resp:  [18-20] 18  BP: ()/(41-85) 98/67  SpO2:  [90 %-95 %] 93 %  I/O's Last 24 hours  I/O last 3 completed shifts:  In: 160 [NG/GT:160]  Out: 500 [Other:500]    Constitutional: Alert, awake and oriented X 2-3, slightly disoriented with situation ; resting comfortably in no apparent distress       Oral cavity: Moist mucosa   Cardiovascular: Normal s1 s2, regular rate and rhythm, no murmur   Lungs: B/l clear to auscultation, no wheezes or crepitations   Abdomen: Soft, nt, nd, no guarding, rigidity or rebound; BS +   LE : No edema   Musculoskeletal:  moving all extremities equally   Neuro: No focal neurological deficits noted   Psychiatry: normal mood and affect  rectal tube noted with soft semisolid stool                Medications:          - MEDICATION INSTRUCTIONS for Dialysis Patients -   Does not apply See Admin Instructions     apixaban ANTICOAGULANT  5 mg Oral BID     folic acid  1 mg Oral or Feeding Tube Daily     lacosamide  100 mg Oral or Feeding Tube BID     lactulose  10 g Oral BID     levETIRAcetam  1,000 mg Oral or Feeding Tube Q24H     lidocaine  1 patch Transdermal Q24H     lidocaine   Transdermal Q8H BIJU     melatonin  3 mg Oral At Bedtime     midodrine  10 mg Oral or Feeding Tube TID w/meals     mineral oil-hydrophilic petrolatum   Topical Daily     nystatin  500,000 Units Swish & Spit 4x  Daily     OLANZapine zydis  5 mg Oral At Bedtime     pantoprazole  40 mg Oral or Feeding Tube BID AC     rifaximin  550 mg Oral or Feeding Tube BID     sodium chloride (PF)  10-30 mL Intracatheter Q8H     sodium chloride (PF)  3 mL Intracatheter Q8H     tacrolimus  1 mg Oral BID IS     PRN Meds: acetaminophen **OR** acetaminophen, acetylcysteine, albuterol, calcium carbonate, artificial tears, glucose **OR** dextrose **OR** glucagon, ipratropium - albuterol 0.5 mg/2.5 mg/3 mL, lidocaine 4%, lidocaine (buffered or not buffered), melatonin, mineral oil-hydrophilic petrolatum, naloxone **OR** naloxone **OR** naloxone **OR** naloxone, nitroGLYcerin, OLANZapine zydis, ondansetron **OR** ondansetron, oxyCODONE, simethicone, sodium chloride (PF), urea         Data:      All new lab and imaging data was reviewed.   Recent Labs   Lab Test 02/06/23  0600 01/31/23  1527 01/25/23  0522 12/22/22  1108 12/21/22  1315 12/17/22  0519 12/16/22  2239 12/16/22  1620   WBC 5.7 11.5* 10.7   < > 3.4*   < > 3.9* 4.5   HGB 8.4* 7.7* 8.5*   < > 8.0*   < > 7.9*  7.9* 7.0*   * 103* 100   < > 102*   < > 106* 107*    164 230   < > 75*   < > 94* 111*   INR  --   --   --   --  1.36*  --  1.14 1.58*    < > = values in this interval not displayed.      Recent Labs   Lab Test 02/06/23  0647 02/06/23  0600 02/05/23  1203 01/30/23  0758 01/30/23  0512 01/28/23  0733 01/28/23  0609   NA  --  140  --   --  139  --  137   POTASSIUM  --  3.8  --   --  4.3  --  4.5   CHLORIDE  --  96*  --   --  96*  --  97*   CO2  --  30*  --   --  30*  --  32*   BUN  --  44.1*  --   --  45.2*  --  23.6*   CR  --  3.97*  --   --  3.99*  --  2.54*   ANIONGAP  --  14  --   --  13  --  8   KELLY  --  9.5  --   --  9.2  --  8.6   * 120* 131*   < > 111*   < > 93    < > = values in this interval not displayed.     No lab results found.    Invalid input(s): TROP, TROPONINIES

## 2023-02-12 ENCOUNTER — APPOINTMENT (OUTPATIENT)
Dept: SPEECH THERAPY | Facility: CLINIC | Age: 58
DRG: 981 | End: 2023-02-12
Attending: STUDENT IN AN ORGANIZED HEALTH CARE EDUCATION/TRAINING PROGRAM
Payer: COMMERCIAL

## 2023-02-12 PROCEDURE — 250N000013 HC RX MED GY IP 250 OP 250 PS 637: Performed by: INTERNAL MEDICINE

## 2023-02-12 PROCEDURE — 99232 SBSQ HOSP IP/OBS MODERATE 35: CPT | Performed by: HOSPITALIST

## 2023-02-12 PROCEDURE — 250N000012 HC RX MED GY IP 250 OP 636 PS 637: Performed by: STUDENT IN AN ORGANIZED HEALTH CARE EDUCATION/TRAINING PROGRAM

## 2023-02-12 PROCEDURE — 250N000013 HC RX MED GY IP 250 OP 250 PS 637

## 2023-02-12 PROCEDURE — 250N000013 HC RX MED GY IP 250 OP 250 PS 637: Performed by: STUDENT IN AN ORGANIZED HEALTH CARE EDUCATION/TRAINING PROGRAM

## 2023-02-12 PROCEDURE — 92526 ORAL FUNCTION THERAPY: CPT | Mod: GN

## 2023-02-12 PROCEDURE — 250N000009 HC RX 250: Performed by: STUDENT IN AN ORGANIZED HEALTH CARE EDUCATION/TRAINING PROGRAM

## 2023-02-12 PROCEDURE — 120N000001 HC R&B MED SURG/OB

## 2023-02-12 RX ORDER — UREA 8.5 G/85G
CREAM TOPICAL 2 TIMES DAILY PRN
Status: DISCONTINUED | OUTPATIENT
Start: 2023-02-12 | End: 2023-02-12

## 2023-02-12 RX ADMIN — APIXABAN 5 MG: 5 TABLET, FILM COATED ORAL at 08:46

## 2023-02-12 RX ADMIN — LACOSAMIDE 100 MG: 10 SOLUTION ORAL at 09:07

## 2023-02-12 RX ADMIN — MICONAZOLE NITRATE: 57 CREAM TOPICAL at 13:19

## 2023-02-12 RX ADMIN — MIDODRINE HYDROCHLORIDE 10 MG: 5 TABLET ORAL at 08:46

## 2023-02-12 RX ADMIN — LACTULOSE 10 G: 10 SOLUTION ORAL at 08:46

## 2023-02-12 RX ADMIN — TACROLIMUS 1 MG: 5 CAPSULE ORAL at 09:07

## 2023-02-12 RX ADMIN — NYSTATIN 500000 UNITS: 100000 SUSPENSION ORAL at 17:37

## 2023-02-12 RX ADMIN — SIMETHICONE 80 MG: 80 TABLET, CHEWABLE ORAL at 15:53

## 2023-02-12 RX ADMIN — LEVETIRACETAM 1000 MG: 100 SOLUTION ORAL at 21:47

## 2023-02-12 RX ADMIN — Medication 40 MG: at 08:46

## 2023-02-12 RX ADMIN — NYSTATIN 500000 UNITS: 100000 SUSPENSION ORAL at 21:46

## 2023-02-12 RX ADMIN — TACROLIMUS 1 MG: 5 CAPSULE ORAL at 21:46

## 2023-02-12 RX ADMIN — OLANZAPINE 5 MG: 5 TABLET, ORALLY DISINTEGRATING ORAL at 21:46

## 2023-02-12 RX ADMIN — NYSTATIN 500000 UNITS: 100000 SUSPENSION ORAL at 12:16

## 2023-02-12 RX ADMIN — FOLIC ACID 1 MG: 1 TABLET ORAL at 08:46

## 2023-02-12 RX ADMIN — LACTULOSE 10 G: 10 SOLUTION ORAL at 20:39

## 2023-02-12 RX ADMIN — RIFAXIMIN 550 MG: 550 TABLET ORAL at 08:46

## 2023-02-12 RX ADMIN — LACOSAMIDE 100 MG: 10 SOLUTION ORAL at 22:58

## 2023-02-12 RX ADMIN — Medication 40 MG: at 15:53

## 2023-02-12 RX ADMIN — APIXABAN 5 MG: 5 TABLET, FILM COATED ORAL at 20:38

## 2023-02-12 RX ADMIN — MELATONIN TAB 3 MG 3 MG: 3 TAB at 21:46

## 2023-02-12 RX ADMIN — MIDODRINE HYDROCHLORIDE 10 MG: 5 TABLET ORAL at 12:15

## 2023-02-12 RX ADMIN — NYSTATIN 500000 UNITS: 100000 SUSPENSION ORAL at 08:46

## 2023-02-12 RX ADMIN — ACETAMINOPHEN 650 MG: 325 TABLET, FILM COATED ORAL at 15:53

## 2023-02-12 RX ADMIN — RIFAXIMIN 550 MG: 550 TABLET ORAL at 20:38

## 2023-02-12 ASSESSMENT — ACTIVITIES OF DAILY LIVING (ADL)
ADLS_ACUITY_SCORE: 63
ADLS_ACUITY_SCORE: 59
ADLS_ACUITY_SCORE: 65
ADLS_ACUITY_SCORE: 59
ADLS_ACUITY_SCORE: 63
ADLS_ACUITY_SCORE: 59
ADLS_ACUITY_SCORE: 59
ADLS_ACUITY_SCORE: 65

## 2023-02-12 NOTE — PLAN OF CARE
A&O to self only. VSS on RA, BP soft.Lung Sounds diminished.Bowel Sounds active, incontinent of stool, rectal tube removed today. Pt on hemodialysis,no urine made today.old trach site dressing changed, blanchable redness to coccyx mepilex in place, skin tear to left elbow mepilex changed. Assist of 2 with lift, pt frequently repositioned.Pain Control with tylenol and simethicone. Tolerating small bite, mod thick liquid diet. 1 bolus TF given per order, peg tube in place. Pt transferred to unit 66 at 1720.

## 2023-02-12 NOTE — PLAN OF CARE
A&O to self. VSS on RA, BP soft. Lung Sounds diminished.Bowel Sounds active, rectal tube in place, leaking occasionally.pt on hemodialysis no urination today. Blanchable redness to coccyx, mepilex in place, mepilex on left elbow skin tear, old trach site dressing CDI, scattered bruising and scabbing.Assist of 2 with a lift.Pain Controlled with tylenol, and massage from family. Tolerating soft diet and thickened liquid diet, appetite poor, bolus TF given twice per orders.

## 2023-02-12 NOTE — PROGRESS NOTES
Brief Nephrology Note    Chart reviewed. HD planned for tomorrow, orders placed. High dose epo ordered.     Dwayne Laboy MD

## 2023-02-12 NOTE — PLAN OF CARE
A&Ox1. Sitter at bedside. VSS on RA. LS diminished. Still wiith rectal tube. No urine output this shift, on HD MWF. R PIV SL. HD cath to R chest intact. Continues with diet, poor appetite. PEG tube clamped, intact. Given TF bolus x1, tolerated. Turned and repositioned. Wound care treatments in place.

## 2023-02-12 NOTE — PROGRESS NOTES
[facility]  Hospitalist Progress Note        Carl Green MD   02/12/2023        Interval History:        - No acute issues overnight; fluctuating mentation  - stools getting more formed; will discontinue rectal tube (2/12)         Assessment and Plan:        Blanco Osborne is a 57 year-old male admitted on 1/21/2023 as a transfer from [facility] for evaluation of loculated pleural effusion. He has extensive PMH including h/o liver transplant 2016 due to alcohol abuse, pAF on chronic anticoagulation, history of diabetes mellitus type 2, CVA, hypertension, CHRISTIAN on BiPAP.    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  He was recovering in [facility] and was transferred to [facility] for consideration of chest tube placement and possible intrapleural lysis for worsening effusion.      Acute metabolic encephalopathy most likely from hospital delirium  Underlying hepatic encephalopathy  *has had waxing and waning mentation, coinciding with lactulose being held at [facility]  *earlier in hospital stay, remained confused and had pulled out tracheostomy tube, PICC line and pulled his dialysis catheter  - fluctuating mentation but seems improving, is alert, awake, mostly oriented with some disorientation to time/situation  - Re-orient as needed; Maintain normal day/night, sleep wake cycles; Minimize sedating/altering medications as able  - adjusted Lactulose to 10 mg BID (2/9) for significant diarrhea needing rectal tube-- stools getting more formed; will discontinue rectal tube (2/12)  - Zyprexa 5 mg at bedtime scheduled.  Melatonin 3 mg p.o. at bedtime scheduled  - Olanzapine BID PRN   - mittens/sitter as needed     Bilateral pleural effusions   Acute now chronic respiratory failure requiring  tracheostomy  - resolved    Pneumonia  - resolved   ARDS  - resolved    Right pneumothorax - resolved   *Initial event was parainfluenza and strep pneumo pneumonia back in November 2022. Treated for ARDS. Had failed extubation x2 and tracheostomy performed on previous hospitalization. *Had additional complications of bilateral pneumothoraces as well as hydropneumothorax- pleural fluid grew candida parapsilosis  *Has already completed 4-week antifungal treatment. While in LTACH he was successfully weaned from the ventilator.  *Recently there were concern for worsening effusion while at [facility] and had thoracentesis 01/13 which returned exudative without growth on cultures. CT chest/abdomen/pelvis on 01/18 demonstrated worsening effusions and concerns for infected parapneumonic effusions with possible SBP.  Multiple pulmonologist at [facility] reviewed CT scan recommended transfer to [facility] for consideration of chest tube placement and possible intrapleural lysis  *Thoracic surgery (Dr. Jackson) consulted during admission   *CT chest 1/22, small effusions on imaging and so chest tube was not inserted.   *Patient pulled out his tracheostomy tube on the night 2/1-2/2 and is tolerating well without increased shortness of breath persistent cough or worsening hypoxia  - Respiratory status stable on room air  - Wound care consult for tracheostomy site care      Goals of care   As per report on 1/25 nursing had mentioned that patient had refused to undergo dialysis and want to stop care, palliative care was consulted.  Patient and his spouse denied wanting to stop care and wanted ongoing restorative care including full code     ESRD  Now on HD.  No return of renal function.  - Nephrology following, continues on M-W-F schedule; last dialyzed 2/10      S/p Liver transplant 2016   Cirrhosis with ascites  Subacute bacterial peritonitis  *Main manifestations of this are hepatic encephalopathy and ascites.  Hepatologist at [place]  felt that most likely reason for the cirrhosis was metabolic syndrome or alcohol intake.  There was no evidence of rejection on biopsy and there was some evidence of regeneration. Paracentesis done on 12/22/2022 showed lactobacillus indicating SBP, treated with Unasyn and Augmentin, finished 2-week course 1/12/2023.  Requires large-volume paracentesis periodically for recurring ascites.    *Paracentesis 1/13/2023 yielded about 7500 cc. Cultures NGTD.  Most recent paracentesis on 1/24 with 4.8 L fluid removal  - Continue intermittent paracentesis as needed if develops symptomatic ascites.   - Further treatment for recurrent ascites with TIPS deferred to his Liver Transplant team and/or Hepatology, he has an appointment on 4/21/2023 with Hepatology, Dr. Simms  - Continue Tacrolimus 1 mg BID.  - Continue rifaximin   - Lactulose dose reduced as noted above     Diarrhea---improving  - C difficile negative on 1/31. Secondary to lactulose.  - discontinue rectal tube (2/12) as stools more formed     Paroxysmal atrial fibrillation  Remains in sinus rhythm.  On anticoagulation with apixaban indefinitely for stroke prophylaxis.       Acute anemia  Pt has required intermittent transfusions for on-going anemia.   - Anemia most likely secondary to critical illness/chronic disease, chronic renal disease  - baseline around 8; Hemoglobin stable, 8.4 g/dl 2/6; repeat labs 2/13  - Transfuse packed red blood cells to keep Hb> 7     History PEA arrest   PEA arrest prior to his previous admission and 1 episode of PEA associated with desaturation on 12/20.  No structural cardiac disease.   - Continue Cardiac Monitoring     Chronic Hypotension  - has developed baseline hypotension 2/2 cirrhosis and prolonged critical illness.   - Continue midodrine       H/o Seizure after hypoxic event  This is in the context of baseline multifactorial encephalopathy secondary to his ongoing critical illness and prior cardiac arrests and prior strokes and  cirrhosis with hepatic encephalopathy. MRI of head of 12/20/22 reveals no PRES or leptomeningeal enhancement but scattered.  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   - Continue levetiracetam, lacosamide       Diabetes mellitus type 2  *Hemoglobin A1c on November 2022 was 4.7  *PTA looks like was on 30 units of Lantus every morning ; was put on sliding scale insulin here   - BG checks and sliding scale insulin discontinued as has been stable without insulin needs     Severe malnutrition, protein and calorie type  Moderate dysphagia.  - Continue with tube feeds via PEG tube  - IR replaced the PEG tube on 2/8/2023  - Nutritionist consulted and following for tube feeds  - SLP following as well     Anxiety  Fluoxetine was discontinued as per psychiatry recommendation on 2/2 (see consult note for details)         Clinically Significant Risk Factors                 # Hypoalbuminemia: Lowest albumin = 1.9 g/dL at 1/23/2023  6:38 AM, will monitor as appropriate            # Severe Malnutrition: based on nutrition assessment     Diet: Room Service  Adult Formula Bolus Feeding: Novasource Renal; Route: Gastrostomy; 3 times daily; Volume per Bolus: 240; mL(s); 80 mL/hr x 3 hrs back up bolus feeding after each meal ONLY IF INTAKE <50% of meal  Snacks/Supplements Adult: Other; 4 oz Ensure with breakfast, Gelatein+ with lunch and magic cup with dinner (RD); With Meals  Soft & Bite Sized Diet (level 6) Liquidized/Moderately Thick (level 3) (By tsp only); No Straws      DVT Prophylaxis: on Eliquis  Code Status: Full Code          Disposition Plan        Expected Discharge Date: likely 02/14/2023 to TCU;  following for disposition    Clinically Significant Risk Factors              # Hypoalbuminemia: Lowest albumin = 1.9 g/dL at 1/23/2023  6:38 AM, will monitor as appropriate            # Severe Malnutrition: based on nutrition assessment        Page Me (7 am to 6 pm)    Care  plan discussed with patient and nursing              Physical Exam:      Blood pressure 105/67, pulse 103, temperature 98.1  F (36.7  C), temperature source Axillary, resp. rate 24, weight 86 kg (189 lb 9.5 oz), SpO2 91 %.  Vitals:    01/31/23 0510 02/10/23 0425 02/11/23 0339   Weight: 84.5 kg (186 lb 4.6 oz) 84.6 kg (186 lb 8.2 oz) 86 kg (189 lb 9.5 oz)     Vital Signs with Ranges  Temp:  [97.5  F (36.4  C)-98.1  F (36.7  C)] 98.1  F (36.7  C)  Pulse:  [] 103  Resp:  [18-24] 24  BP: ()/(60-77) 105/67  SpO2:  [91 %-97 %] 91 %  I/O's Last 24 hours  I/O last 3 completed shifts:  In: 320 [P.O.:20; NG/GT:60]  Out: -     Constitutional: Alert, awake and oriented X 2-3, slightly disoriented with time/situation ; resting comfortably in no apparent distress, slightly impulsive        Oral cavity: Moist mucosa   Cardiovascular: Normal s1 s2, regular rate and rhythm, no murmur   Lungs: B/l clear to auscultation, no wheezes or crepitations   Abdomen: Soft, nt, nd, no guarding, rigidity or rebound; BS +   LE : No edema   Musculoskeletal:  moving all extremities equally   Neuro: No focal neurological deficits noted   Psychiatry: normal mood and affect  rectal tube noted with soft semisolid stool                Medications:          - MEDICATION INSTRUCTIONS for Dialysis Patients -   Does not apply See Admin Instructions     apixaban ANTICOAGULANT  5 mg Oral BID     folic acid  1 mg Oral or Feeding Tube Daily     lacosamide  100 mg Oral or Feeding Tube BID     lactulose  10 g Oral BID     levETIRAcetam  1,000 mg Oral or Feeding Tube Q24H     lidocaine  1 patch Transdermal Q24H     lidocaine   Transdermal Q8H BIJU     melatonin  3 mg Oral At Bedtime     midodrine  10 mg Oral or Feeding Tube TID w/meals     mineral oil-hydrophilic petrolatum   Topical Daily     nystatin  500,000 Units Swish & Spit 4x Daily     OLANZapine zydis  5 mg Oral At Bedtime     pantoprazole  40 mg Oral or Feeding Tube BID AC     rifaximin  550 mg  Oral or Feeding Tube BID     sodium chloride (PF)  10-30 mL Intracatheter Q8H     sodium chloride (PF)  3 mL Intracatheter Q8H     tacrolimus  1 mg Oral BID IS     PRN Meds: acetaminophen **OR** acetaminophen, acetylcysteine, albuterol, calcium carbonate, artificial tears, glucose **OR** dextrose **OR** glucagon, ipratropium - albuterol 0.5 mg/2.5 mg/3 mL, lidocaine 4%, lidocaine (buffered or not buffered), melatonin, mineral oil-hydrophilic petrolatum, naloxone **OR** naloxone **OR** naloxone **OR** naloxone, nitroGLYcerin, OLANZapine zydis, ondansetron **OR** ondansetron, oxyCODONE, simethicone, sodium chloride (PF), urea         Data:      All new lab and imaging data was reviewed.   Recent Labs   Lab Test 02/06/23  0600 01/31/23  1527 01/25/23  0522 12/22/22  1108 12/21/22  1315 12/17/22  0519 12/16/22  2239 12/16/22  1620   WBC 5.7 11.5* 10.7   < > 3.4*   < > 3.9* 4.5   HGB 8.4* 7.7* 8.5*   < > 8.0*   < > 7.9*  7.9* 7.0*   * 103* 100   < > 102*   < > 106* 107*    164 230   < > 75*   < > 94* 111*   INR  --   --   --   --  1.36*  --  1.14 1.58*    < > = values in this interval not displayed.      Recent Labs   Lab Test 02/06/23  0647 02/06/23  0600 02/05/23  1203 01/30/23  0758 01/30/23  0512 01/28/23  0733 01/28/23  0609   NA  --  140  --   --  139  --  137   POTASSIUM  --  3.8  --   --  4.3  --  4.5   CHLORIDE  --  96*  --   --  96*  --  97*   CO2  --  30*  --   --  30*  --  32*   BUN  --  44.1*  --   --  45.2*  --  23.6*   CR  --  3.97*  --   --  3.99*  --  2.54*   ANIONGAP  --  14  --   --  13  --  8   KELLY  --  9.5  --   --  9.2  --  8.6   * 120* 131*   < > 111*   < > 93    < > = values in this interval not displayed.     No lab results found.    Invalid input(s): TROP, TROPONINIES

## 2023-02-13 LAB
ANION GAP SERPL CALCULATED.3IONS-SCNC: 7 MMOL/L (ref 7–15)
BASOPHILS # BLD AUTO: 0.1 10E3/UL (ref 0–0.2)
BASOPHILS NFR BLD AUTO: 1 %
BUN SERPL-MCNC: 60.2 MG/DL (ref 6–20)
CALCIUM SERPL-MCNC: 9.7 MG/DL (ref 8.6–10)
CHLORIDE SERPL-SCNC: 101 MMOL/L (ref 98–107)
CREAT SERPL-MCNC: 4.07 MG/DL (ref 0.67–1.17)
DEPRECATED HCO3 PLAS-SCNC: 34 MMOL/L (ref 22–29)
EOSINOPHIL # BLD AUTO: 0.5 10E3/UL (ref 0–0.7)
EOSINOPHIL NFR BLD AUTO: 8 %
ERYTHROCYTE [DISTWIDTH] IN BLOOD BY AUTOMATED COUNT: 20.9 % (ref 10–15)
GFR SERPL CREATININE-BSD FRML MDRD: 16 ML/MIN/1.73M2
GLUCOSE SERPL-MCNC: 93 MG/DL (ref 70–99)
HCT VFR BLD AUTO: 26.7 % (ref 40–53)
HGB BLD-MCNC: 7.7 G/DL (ref 13.3–17.7)
IMM GRANULOCYTES # BLD: 0 10E3/UL
IMM GRANULOCYTES NFR BLD: 1 %
LYMPHOCYTES # BLD AUTO: 1.7 10E3/UL (ref 0.8–5.3)
LYMPHOCYTES NFR BLD AUTO: 30 %
MAGNESIUM SERPL-MCNC: 1.9 MG/DL (ref 1.7–2.3)
MCH RBC QN AUTO: 29.5 PG (ref 26.5–33)
MCHC RBC AUTO-ENTMCNC: 28.8 G/DL (ref 31.5–36.5)
MCV RBC AUTO: 102 FL (ref 78–100)
MONOCYTES # BLD AUTO: 0.7 10E3/UL (ref 0–1.3)
MONOCYTES NFR BLD AUTO: 12 %
NEUTROPHILS # BLD AUTO: 2.8 10E3/UL (ref 1.6–8.3)
NEUTROPHILS NFR BLD AUTO: 48 %
NRBC # BLD AUTO: 0 10E3/UL
NRBC BLD AUTO-RTO: 0 /100
PHOSPHATE SERPL-MCNC: 3.8 MG/DL (ref 2.5–4.5)
PLATELET # BLD AUTO: 126 10E3/UL (ref 150–450)
POTASSIUM SERPL-SCNC: 4.7 MMOL/L (ref 3.4–5.3)
RBC # BLD AUTO: 2.61 10E6/UL (ref 4.4–5.9)
SODIUM SERPL-SCNC: 142 MMOL/L (ref 136–145)
WBC # BLD AUTO: 5.7 10E3/UL (ref 4–11)

## 2023-02-13 PROCEDURE — 250N000013 HC RX MED GY IP 250 OP 250 PS 637: Performed by: FAMILY MEDICINE

## 2023-02-13 PROCEDURE — 250N000011 HC RX IP 250 OP 636: Performed by: STUDENT IN AN ORGANIZED HEALTH CARE EDUCATION/TRAINING PROGRAM

## 2023-02-13 PROCEDURE — 250N000009 HC RX 250: Performed by: STUDENT IN AN ORGANIZED HEALTH CARE EDUCATION/TRAINING PROGRAM

## 2023-02-13 PROCEDURE — 80048 BASIC METABOLIC PNL TOTAL CA: CPT | Performed by: STUDENT IN AN ORGANIZED HEALTH CARE EDUCATION/TRAINING PROGRAM

## 2023-02-13 PROCEDURE — P9045 ALBUMIN (HUMAN), 5%, 250 ML: HCPCS | Performed by: STUDENT IN AN ORGANIZED HEALTH CARE EDUCATION/TRAINING PROGRAM

## 2023-02-13 PROCEDURE — 85025 COMPLETE CBC W/AUTO DIFF WBC: CPT | Performed by: HOSPITALIST

## 2023-02-13 PROCEDURE — 250N000013 HC RX MED GY IP 250 OP 250 PS 637: Performed by: STUDENT IN AN ORGANIZED HEALTH CARE EDUCATION/TRAINING PROGRAM

## 2023-02-13 PROCEDURE — 250N000013 HC RX MED GY IP 250 OP 250 PS 637

## 2023-02-13 PROCEDURE — 250N000012 HC RX MED GY IP 250 OP 636 PS 637: Performed by: STUDENT IN AN ORGANIZED HEALTH CARE EDUCATION/TRAINING PROGRAM

## 2023-02-13 PROCEDURE — 83735 ASSAY OF MAGNESIUM: CPT | Performed by: STUDENT IN AN ORGANIZED HEALTH CARE EDUCATION/TRAINING PROGRAM

## 2023-02-13 PROCEDURE — 250N000013 HC RX MED GY IP 250 OP 250 PS 637: Performed by: INTERNAL MEDICINE

## 2023-02-13 PROCEDURE — 99232 SBSQ HOSP IP/OBS MODERATE 35: CPT | Performed by: HOSPITALIST

## 2023-02-13 PROCEDURE — 90935 HEMODIALYSIS ONE EVALUATION: CPT | Performed by: INTERNAL MEDICINE

## 2023-02-13 PROCEDURE — 36415 COLL VENOUS BLD VENIPUNCTURE: CPT | Performed by: HOSPITALIST

## 2023-02-13 PROCEDURE — 84100 ASSAY OF PHOSPHORUS: CPT | Performed by: STUDENT IN AN ORGANIZED HEALTH CARE EDUCATION/TRAINING PROGRAM

## 2023-02-13 PROCEDURE — 90937 HEMODIALYSIS REPEATED EVAL: CPT

## 2023-02-13 PROCEDURE — 634N000001 HC RX 634: Performed by: STUDENT IN AN ORGANIZED HEALTH CARE EDUCATION/TRAINING PROGRAM

## 2023-02-13 PROCEDURE — 120N000001 HC R&B MED SURG/OB

## 2023-02-13 PROCEDURE — 258N000003 HC RX IP 258 OP 636: Performed by: STUDENT IN AN ORGANIZED HEALTH CARE EDUCATION/TRAINING PROGRAM

## 2023-02-13 RX ADMIN — HEPARIN SODIUM 2000 UNITS: 1000 INJECTION INTRAVENOUS; SUBCUTANEOUS at 12:57

## 2023-02-13 RX ADMIN — MIDODRINE HYDROCHLORIDE 10 MG: 5 TABLET ORAL at 09:32

## 2023-02-13 RX ADMIN — APIXABAN 5 MG: 5 TABLET, FILM COATED ORAL at 09:32

## 2023-02-13 RX ADMIN — FOLIC ACID 1 MG: 1 TABLET ORAL at 13:47

## 2023-02-13 RX ADMIN — RIFAXIMIN 550 MG: 550 TABLET ORAL at 20:15

## 2023-02-13 RX ADMIN — Medication 40 MG: at 09:33

## 2023-02-13 RX ADMIN — NYSTATIN 500000 UNITS: 100000 SUSPENSION ORAL at 18:19

## 2023-02-13 RX ADMIN — Medication 40 MG: at 17:33

## 2023-02-13 RX ADMIN — SODIUM CHLORIDE 300 ML: 9 INJECTION, SOLUTION INTRAVENOUS at 12:52

## 2023-02-13 RX ADMIN — ALBUMIN HUMAN 250 ML: 0.05 INJECTION, SOLUTION INTRAVENOUS at 12:52

## 2023-02-13 RX ADMIN — APIXABAN 5 MG: 5 TABLET, FILM COATED ORAL at 20:15

## 2023-02-13 RX ADMIN — HEPARIN SODIUM 2000 UNITS: 1000 INJECTION INTRAVENOUS; SUBCUTANEOUS at 12:58

## 2023-02-13 RX ADMIN — LEVETIRACETAM 1000 MG: 100 SOLUTION ORAL at 21:36

## 2023-02-13 RX ADMIN — EPOETIN ALFA-EPBX 20000 UNITS: 10000 INJECTION, SOLUTION INTRAVENOUS; SUBCUTANEOUS at 13:02

## 2023-02-13 RX ADMIN — OLANZAPINE 5 MG: 5 TABLET, ORALLY DISINTEGRATING ORAL at 21:36

## 2023-02-13 RX ADMIN — LACTULOSE 10 G: 10 SOLUTION ORAL at 20:15

## 2023-02-13 RX ADMIN — WHITE PETROLATUM: 1.75 OINTMENT TOPICAL at 17:33

## 2023-02-13 RX ADMIN — LIDOCAINE 1 PATCH: 560 PATCH PERCUTANEOUS; TOPICAL; TRANSDERMAL at 14:32

## 2023-02-13 RX ADMIN — LACOSAMIDE 100 MG: 10 SOLUTION ORAL at 13:47

## 2023-02-13 RX ADMIN — MIDODRINE HYDROCHLORIDE 10 MG: 5 TABLET ORAL at 13:46

## 2023-02-13 RX ADMIN — TACROLIMUS 1 MG: 5 CAPSULE ORAL at 13:47

## 2023-02-13 RX ADMIN — MELATONIN TAB 3 MG 3 MG: 3 TAB at 21:36

## 2023-02-13 RX ADMIN — MIDODRINE HYDROCHLORIDE 10 MG: 5 TABLET ORAL at 17:34

## 2023-02-13 RX ADMIN — RIFAXIMIN 550 MG: 550 TABLET ORAL at 09:32

## 2023-02-13 RX ADMIN — LACTULOSE 10 G: 10 SOLUTION ORAL at 09:32

## 2023-02-13 ASSESSMENT — ACTIVITIES OF DAILY LIVING (ADL)
ADLS_ACUITY_SCORE: 65

## 2023-02-13 NOTE — PLAN OF CARE
Goal Outcome Evaluation:       DATE & TIME transfer from [facility]. with bilateral pleural effusion   Cognitive Concerns/ Orientation : A&o x 2, confused   BEHAVIOR & AGGRESSION TOOL COLOR: green  CIWA SCORE: NA  ABNL VS/O2: Soft BP, other VSS, on RA  MOBILITY:Total, up with lift  PAIN MANAGMENT:denies, appears comfortable  DIET:soft, bite sized with Moderately thick liqiuds, Total feed, Bolus feeding TID if less than 50% meals eaten  BOWEL/BLADDER:incontinent, Dialysis patient, no urine .  ABNL LAB/BG:nothing new  DRAIN/DEVICES: extended PIV on right arm, PEG tube, right Tunnel cath for dialysis.  TELEMETRY RHYTHM: NA  SKIN:scattered bruises/scabs,Mapilex to coccyx and left elbow. Old trach site dressing c/d/I. Redness on buttocks  TESTS/PROCEDURES: none  D/C DATE:pending progress.  OTHER IMPORTANT INFO:Mitts on to prevent pulling lines,sitter at bed side, M/W/F  dialysis, Nephrology following.

## 2023-02-13 NOTE — PLAN OF CARE
Goal Outcome Evaluation:  DATE & TIME 2/12/23 1700- 1930 C transfer.Admitted with Pleural effusion.   Cognitive Concerns/ Orientation : A&o x 2-3, confused .  BEHAVIOR & AGGRESSION TOOL COLOR: green  CIWA SCORE: NA  ABNL VS/O2: VSS, on RA  MOBILITY:Total, up with lift, Turn and repo Q 2hrs  PAIN MANAGMENT:denies  DIET:soft, bite sized with Moderately thic liqiuds, Total feed  BOWEL/BLADDER:incontinent, Dialysis patient, no urine .  ABNL LAB/BG:nothing new  DRAIN/DEVICES: extended PIV right arm, PEG tube,   right Tunnel cath for dialysis.  TELEMETRY RHYTHM: NA  SKIN:scattered bruises,Meplix to coccyx and left elbow. Old trach site dressing c/d/I.  TESTS/PROCEDURES: none  D/C DATE:pending progress.  OTHER IMPORTANT INFO:Mitts on to prevent pulling lines,sitter at bed side, M/W/ F dialysis, Nephrology following. LS diminished .

## 2023-02-13 NOTE — PROGRESS NOTES
CLINICAL NUTRITION SERVICES - REASSESSMENT NOTE    Recommendations Ordered by Registered Dietitian (RD):   - Supplements as ordered & room service assistance   - TF as ordered / as needed  - Could consider nocturnal regimen if oral intakes are inconsistent    Malnutrition:   (1/22)  % Weight Loss:  > 5% in 1 month (severe malnutrition)  % Intake:  </= 50% for >/= 5 days (severe malnutrition)- suspected   Subcutaneous Fat Loss:  Orbital region moderate depletion, Upper arm region moderate-severe depletion and Thoracic region moderate-severe depletion  Muscle Loss:  Temporal region moderate depletion, Clavicle bone region severe depletion, Acromion bone region severe depletion, Patellar region moderate depletion and Anterior thigh region moderate-severe depletion  Fluid Retention:  Trace     Malnutrition Diagnosis: Severe malnutrition  In Context of:  Acute on Chronic illness or disease     EVALUATION OF PROGRESS TOWARD GOALS   Diet: Soft & Bite Sized (level 6) & Liquidized/Moderately Thick (level 3). No straws  Room service w/ assist    Supplements: 4 oz Ensure w/ Breakfast  Gelatein w/ Lunch  Magic Cup w/ dinner    Nutrition Support:  PRN Bolus TF (Only to be given if pt eats <50% of his meal).     Type of Feeding Tube: G-tube  Enteral Frequency:  Continuous  Enteral Regimen: Novasource Renal at 80 mL/hr x 3 hours TID --> IF consumes < 50% of meal  Total Enteral Provisions: 240 mL formula TID or 720 mL/day = 1440 kcal, 66 g protein, 132 g CHO, 0 g fiber and 516 mL free water (meeting approx 58% estimated energy needs and 66% estimated protein needs)    Free Water Flush: 100 mL before and after each feeding    Intake/Tolerance:   Enteral intake:   2/13: no boluses needed yet  2/12: bolus x1 given yesterday.   2/11: bolus x2 given   2/10: bolus x1 (at least)    - Spoke to RN briefly this afternoon. Pt transferred units yesterday, since then he has not required a bolus TF in setting of adequate intakes.   - Ate 100% of  his breakfast this morning: eggs, potatoes, sausage, magic cup, and 4 oz of ensure. (800 kcal, 30 g protein).   - Lunch and dinner for today are ordered and adequate.  - Pt has been receiving meals TID, may eat between 25-75%. This morning was the first documentation of 100%.     - Labs: BUN 60.2, Cr 4.07 - elevated this morning, had HD today. BGM acceptable.   - Stooling: BM x4 today. Rectal tube out on 2/12  - Weight: 86 kg measured on 2/11. Stable from 1/26.    ASSESSED NUTRITION NEEDS:  Dosing Weight 82.4 kg   Estimated Energy Needs: 9295-5477 kcals (30-35 Kcal/Kg)  Justification: repletion and HD  Estimated Protein Needs: + grams protein (1.2-1.5+ g pro/Kg)  Justification: Repletion and dialysis    NEW FINDINGS:   - HD run today     Previous Goals:   EN + PO intake to meet % nutrition needs   Evaluation: Appears to be meeting     Previous Nutrition Diagnosis:   Inadequate protein-energy intake related to decreased appetite with early satiety/bloating/nausea and intermittent refusal of TF as evidenced by PO + EN intake meeting </=  50% nutrition needs for the past ~3-4 weeks   Evaluation: Improving, update below    CURRENT NUTRITION DIAGNOSIS  Inadequate oral intake related to reliance on TF for make-up volumes with intermittent tf refusal as evidenced by patient has been receiving 1-2 bolus feeds daily, with intakes between 25-75% of most meals the past 4 days.     INTERVENTIONS  Recommendations / Nutrition Prescription  Continue current orders. Preference for oral intake, if unable to eat 50% of meals replace with bolus feeds.     Implementation  Collaboration and Referral of Nutrition care: RN seen in room.     Goals  TF + PO to provide % estimated needs.     MONITORING AND EVALUATION:  Progress towards goals will be monitored and evaluated per protocol and Practice Guidelines    Edna Osuna RD, LD  [facility], Ortho, [facility]  Pager: [phone]  Weekend Pager: [phone]

## 2023-02-13 NOTE — PROGRESS NOTES
[facility]     Renal Progress Note       SHORTHAND KEY FOR MY NOTES:  c = with, s = without, p = after, a = before, x = except, asx = asymptomatic, tx = transplant or treatment, sx = symptoms or symptomatic, cx = canceled or culture, rxn = reaction, yday = yesterday, nl = normal, abx = antibiotics, fxn = function, dx = diagnosis, dz = disease, m/h = melena/hematochezia, c/d/l/ha = cramping/dizziness/lightheadedness/headache, d/c = discharge or diarrhea/constipation, f/c/n/v = fevers/chills/nausea/vomiting, cp/sob = chest pain/shortness of breath, tbv = total body volume, rxn = reaction, tdc = tunneled dialysis catheter, pta = prior to admission, hd = hemodialysis, pd = peritoneal dialysis, hhd = home hemodialysis, edw = estimated dry wt         Assessment/Plan:     1.  ESKD.  Pt dialysing per a MWF schedule.  No issues today.  He is using midodrine + alb prime and BP is ok today.  Tolerating fluid removal s probs.  A.  Next HD on Weds.  B.  Continue midodrine + alb prime each run.    2.  Pruritis.  This is still an issue.  A.  Continue urea cream prn.    3.  Anemia.  Pt's hb is low.  A.  EPO 20k qHD.    4.  FEN.  Pt is on intermittent TF + trying to eat as well.  Electrolytes are ok.  A.  Same plan c nutrition.        Interval History:     Pt denies any significant c/d/l while on HD, but still gets some d/l at times.  No cp/sob.  He continues to have itching.          Medications and Allergies:       - MEDICATION INSTRUCTIONS for Dialysis Patients -   Does not apply See Admin Instructions     sodium chloride 0.9%  300 mL Hemodialysis Machine Once     albumin human  250 mL Intravenous Once in dialysis/CRRT     apixaban ANTICOAGULANT  5 mg Oral BID     epoetin mirta-epbx  20,000 Units Intravenous During Dialysis/CRRT (from stock)     folic acid  1 mg Oral or Feeding Tube Daily     sodium chloride (PF) 0.9%  10 mL Intracatheter Once in dialysis/CRRT    Followed by     heparin  1.3-2.6 mL  Intracatheter Once in dialysis/CRRT     sodium chloride (PF) 0.9%  10 mL Intracatheter Once in dialysis/CRRT    Followed by     heparin  1.3-2.6 mL Intracatheter Once in dialysis/CRRT     lacosamide  100 mg Oral or Feeding Tube BID     lactulose  10 g Oral BID     levETIRAcetam  1,000 mg Oral or Feeding Tube Q24H     lidocaine  1 patch Transdermal Q24H     lidocaine   Transdermal Q8H BIJU     melatonin  3 mg Oral At Bedtime     midodrine  10 mg Oral or Feeding Tube TID w/meals     mineral oil-hydrophilic petrolatum   Topical Daily     - MEDICATION INSTRUCTIONS -   Does not apply Once     nystatin  500,000 Units Swish & Spit 4x Daily     OLANZapine zydis  5 mg Oral At Bedtime     pantoprazole  40 mg Oral or Feeding Tube BID AC     rifaximin  550 mg Oral or Feeding Tube BID     sodium chloride (PF)  10-30 mL Intracatheter Q8H     sodium chloride (PF)  3 mL Intracatheter Q8H     tacrolimus  1 mg Oral BID IS     No Known Allergies       Physical Exam:     Vitals were reviewed     , Blood pressure 105/74, pulse 101, temperature 97.6  F (36.4  C), temperature source Axillary, resp. rate (!) 5, weight 86 kg (189 lb 9.5 oz), SpO2 98 %.  Wt Readings from Last 3 Encounters:   02/11/23 86 kg (189 lb 9.5 oz)   01/21/23 82.4 kg (181 lb 11.2 oz)   12/28/22 96 kg (211 lb 10.3 oz)     Intake/Output Summary (Last 24 hours) at 2/13/2023 1038  Last data filed at 2/13/2023 0900  Gross per 24 hour   Intake 680 ml   Output --   Net 680 ml     GENERAL APPEARANCE: pleasant, NAD, alert  HEENT:  eyes/ears/nose/neck grossly nl  RESP: CTA B c good efforts  CV: reg, tachy, nl S1/S2   ABDOMEN: s/nt/nd, + GT  EXTREMITIES/SKIN: no ble edema; + B mitts    Pt seen on HD.  Stable run.  -1.5L UF goal.  Good BFR via RIJ TDC.         Data:     CBC RESULTS:     Recent Labs   Lab 02/13/23  0850   WBC 5.7   RBC 2.61*   HGB 7.7*   HCT 26.7*   *     Basic Metabolic Panel:  Recent Labs   Lab 02/13/23  0850      POTASSIUM 4.7   CHLORIDE 101   CO2  34*   BUN 60.2*   CR 4.07*   GLC 93   KELLY 9.7     INRNo lab results found in last 7 days.   Attestation:   I have reviewed today's relevant vital signs, notes, medications, labs and imaging.    Thierry Reddy MD  [facility] - Nephrology  [phone]

## 2023-02-13 NOTE — PROGRESS NOTES
Transfer in/out    Transfer to [facility] from [facility]  Report given to/received from     Brief note about patient status upon transfer:Pt transferred in bed with pulstate mattress. Bilateral mitts on. Pt oriented to self and place.      Code status: Full Code  Skin:   Fall Risk: Yes- Department fall risk interventions implemented.  Isolation: None  Patient belongings: none   If patient is a 72 hour hold/Commitment are belongings removed from room and locked up? NA  Medication drips upon transfer: na  Sign and held orders released? NA

## 2023-02-13 NOTE — PLAN OF CARE
Goal Outcome Evaluation:           Overall Patient Progress: improvingOverall Patient Progress: improving    Outcome Evaluation: tolerated 100% of a good breakfast today. Adequate Lunch & Dinner ordered. Continue with current plan - replace meals with TF bolus if pt fails to consume >50%.

## 2023-02-13 NOTE — PROGRESS NOTES
[facility]  Hospitalist Progress Note        Carl Green MD   02/13/2023        Interval History:        - No acute issues overnight; fluctuating mentation  - discontinued rectal tube (2/12)  - getting dialysis 2/13         Assessment and Plan:        Blanco Osborne is a 57 year-old male admitted on 1/21/2023 as a transfer from [facility] for evaluation of loculated pleural effusion. He has extensive PMH including h/o liver transplant 2016 due to alcohol abuse, pAF on chronic anticoagulation, history of diabetes mellitus type 2, CVA, hypertension, CHRISTIAN on BiPAP.    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  He was recovering in [facility] and was transferred to [facility] for consideration of chest tube placement and possible intrapleural lysis for worsening effusion.      Acute metabolic encephalopathy most likely from hospital delirium  Underlying hepatic encephalopathy  *has had waxing and waning mentation, coinciding with lactulose being held at [facility]  *earlier in hospital stay, remained confused and had pulled out tracheostomy tube, PICC line and pulled his dialysis catheter  - continues with fluctuating mentation  - Re-orient as needed; Maintain normal day/night, sleep wake cycles; Minimize sedating/altering medications as able  - adjusted Lactulose to 10 mg BID (2/9) for significant diarrhea needing rectal tube-- discontinued rectal tube (2/12) once stool more formed; will recheck ammonia leveles 2/14  - Zyprexa 5 mg at bedtime scheduled.  Melatonin 3 mg p.o. at bedtime scheduled  - Olanzapine BID PRN   - mittens/sitter as needed     Bilateral pleural effusions   Acute now chronic respiratory failure requiring tracheostomy  - resolved    Pneumonia  - resolved   ARDS  -  resolved    Right pneumothorax - resolved   *Initial event was parainfluenza and strep pneumo pneumonia back in November 2022. Treated for ARDS. Had failed extubation x2 and tracheostomy performed on previous hospitalization. *Had additional complications of bilateral pneumothoraces as well as hydropneumothorax- pleural fluid grew candida parapsilosis  *Has already completed 4-week antifungal treatment. While in LTACH he was successfully weaned from the ventilator.  *Recently there were concern for worsening effusion while at [facility] and had thoracentesis 01/13 which returned exudative without growth on cultures. CT chest/abdomen/pelvis on 01/18 demonstrated worsening effusions and concerns for infected parapneumonic effusions with possible SBP.  Multiple pulmonologist at [facility] reviewed CT scan recommended transfer to [facility] for consideration of chest tube placement and possible intrapleural lysis  *Thoracic surgery (Dr. Jackson) consulted during admission   *CT chest 1/22, small effusions on imaging and so chest tube was not inserted.   *Patient pulled out his tracheostomy tube on the night 2/1-2/2 and is tolerating well without increased shortness of breath persistent cough or worsening hypoxia  - Respiratory status stable on room air  - Wound care consult for tracheostomy site care      Goals of care   As per report on 1/25 nursing had mentioned that patient had refused to undergo dialysis and want to stop care, palliative care was consulted.  Patient and his spouse denied wanting to stop care and wanted ongoing restorative care including full code     ESRD  Now on HD.  No return of renal function.  - Nephrology following, continues on M-W-F schedule; last dialyzed 2/13      S/p Liver transplant 2016   Cirrhosis with ascites  Subacute bacterial peritonitis  *Main manifestations of this are hepatic encephalopathy and ascites.  Hepatologist at [place] felt that most likely reason for the cirrhosis was metabolic  syndrome or alcohol intake.  There was no evidence of rejection on biopsy and there was some evidence of regeneration. Paracentesis done on 12/22/2022 showed lactobacillus indicating SBP, treated with Unasyn and Augmentin, finished 2-week course 1/12/2023.  Requires large-volume paracentesis periodically for recurring ascites.    *Paracentesis 1/13/2023 yielded about 7500 cc. Cultures NGTD.  Most recent paracentesis on 1/24 with 4.8 L fluid removal  - Continue intermittent paracentesis as needed if develops symptomatic ascites.   - Further treatment for recurrent ascites with TIPS deferred to his Liver Transplant team and/or Hepatology, he has an appointment on 4/21/2023 with Hepatology, Dr. Simms  - Continue Tacrolimus 1 mg BID.  - Continue rifaximin   - Lactulose dose reduced as noted above     Diarrhea---improved  - C difficile negative on 1/31. Secondary to lactulose.  - discontinued rectal tube (2/12) as stools more formed     Paroxysmal atrial fibrillation  Remains in sinus rhythm.  On anticoagulation with apixaban indefinitely for stroke prophylaxis.       Acute anemia  Pt has required intermittent transfusions for on-going anemia.   - Anemia most likely secondary to critical illness/chronic disease, chronic renal disease  - baseline around 7-8; Hemoglobin stable, 7.7 on 2/13  - Transfuse packed red blood cells to keep Hb> 7     History PEA arrest   PEA arrest prior to his previous admission and 1 episode of PEA associated with desaturation on 12/20.  No structural cardiac disease.   - Continue Cardiac Monitoring     Chronic Hypotension  - has developed baseline hypotension 2/2 cirrhosis and prolonged critical illness.   - Continue midodrine       H/o Seizure after hypoxic event  This is in the context of baseline multifactorial encephalopathy secondary to his ongoing critical illness and prior cardiac arrests and prior strokes and cirrhosis with hepatic encephalopathy. MRI of head of 12/20/22 reveals no PRES  or leptomeningeal enhancement but scattered.  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   - Continue levetiracetam, lacosamide       Diabetes mellitus type 2  *Hemoglobin A1c on November 2022 was 4.7  *PTA looks like was on 30 units of Lantus every morning ; was put on sliding scale insulin here   - BG checks and sliding scale insulin discontinued as has been stable without insulin needs     Severe malnutrition, protein and calorie type  Moderate dysphagia.  - Continue with tube feeds via PEG tube  - IR replaced the PEG tube on 2/8/2023  - Nutritionist consulted and following for tube feeds  - SLP following as well     Anxiety  Fluoxetine was discontinued as per psychiatry recommendation on 2/2 (see consult note for details)         Clinically Significant Risk Factors                 # Hypoalbuminemia: Lowest albumin = 1.9 g/dL at 1/23/2023  6:38 AM, will monitor as appropriate            # Severe Malnutrition: based on nutrition assessment     Diet: Room Service  Adult Formula Bolus Feeding: Novasource Renal; Route: Gastrostomy; 3 times daily; Volume per Bolus: 240; mL(s); 80 mL/hr x 3 hrs back up bolus feeding after each meal ONLY IF INTAKE <50% of meal  Snacks/Supplements Adult: Other; 4 oz Ensure with breakfast, Gelatein+ with lunch and magic cup with dinner (RD); With Meals  Soft & Bite Sized Diet (level 6) Liquidized/Moderately Thick (level 3) (By tsp only); No Straws      DVT Prophylaxis: on Eliquis  Code Status: Full Code          Disposition Plan        Expected Discharge Date: pending placement to [facility]  -  following for disposition    Clinically Significant Risk Factors              # Hypoalbuminemia: Lowest albumin = 1.9 g/dL at 1/23/2023  6:38 AM, will monitor as appropriate            # Severe Malnutrition: based on nutrition assessment        Page Me (7 am to 6 pm)                Physical Exam:      Blood pressure 107/71, pulse 99, temperature 97.6   F (36.4  C), temperature source Axillary, resp. rate 20, weight 86 kg (189 lb 9.5 oz), SpO2 93 %.  Vitals:    01/31/23 0510 02/10/23 0425 02/11/23 0339   Weight: 84.5 kg (186 lb 4.6 oz) 84.6 kg (186 lb 8.2 oz) 86 kg (189 lb 9.5 oz)     Vital Signs with Ranges  Temp:  [97.2  F (36.2  C)-97.8  F (36.6  C)] 97.6  F (36.4  C)  Pulse:  [] 99  Resp:  [18-20] 20  BP: ()/(51-71) 107/71  SpO2:  [90 %-96 %] 93 %  I/O's Last 24 hours  I/O last 3 completed shifts:  In: 380 [P.O.:200; NG/GT:180]  Out: -     Constitutional: Alert, awake and oriented X 2, disoriented with time/situation ; resting comfortably in no apparent distress       Oral cavity: Moist mucosa   Cardiovascular: Normal s1 s2, regular rate and rhythm, no murmur   Lungs: B/l clear to auscultation, no wheezes or crepitations   Abdomen: Soft, nt, nd, no guarding, rigidity or rebound; BS +   LE : No edema   Musculoskeletal:  moving all extremities equally   Neuro: No focal neurological deficits noted   Psychiatry: normal mood and affect                Medications:          - MEDICATION INSTRUCTIONS for Dialysis Patients -   Does not apply See Admin Instructions     apixaban ANTICOAGULANT  5 mg Oral BID     epoetin mirta-epbx  20,000 Units Intravenous During Dialysis/CRRT (from stock)     folic acid  1 mg Oral or Feeding Tube Daily     lacosamide  100 mg Oral or Feeding Tube BID     lactulose  10 g Oral BID     levETIRAcetam  1,000 mg Oral or Feeding Tube Q24H     lidocaine  1 patch Transdermal Q24H     lidocaine   Transdermal Q8H BIJU     melatonin  3 mg Oral At Bedtime     midodrine  10 mg Oral or Feeding Tube TID w/meals     mineral oil-hydrophilic petrolatum   Topical Daily     nystatin  500,000 Units Swish & Spit 4x Daily     OLANZapine zydis  5 mg Oral At Bedtime     pantoprazole  40 mg Oral or Feeding Tube BID AC     rifaximin  550 mg Oral or Feeding Tube BID     sodium chloride (PF)  10-30 mL Intracatheter Q8H     sodium chloride (PF)  3 mL  Intracatheter Q8H     tacrolimus  1 mg Oral BID IS     PRN Meds: acetaminophen **OR** acetaminophen, acetylcysteine, albuterol, calcium carbonate, artificial tears, glucose **OR** dextrose **OR** glucagon, ipratropium - albuterol 0.5 mg/2.5 mg/3 mL, lidocaine 4%, lidocaine (buffered or not buffered), melatonin, mineral oil-hydrophilic petrolatum, naloxone **OR** naloxone **OR** naloxone **OR** naloxone, nitroGLYcerin, OLANZapine zydis, ondansetron **OR** ondansetron, oxyCODONE, simethicone, sodium chloride (PF), urea         Data:      All new lab and imaging data was reviewed.   Recent Labs   Lab Test 02/06/23  0600 01/31/23  1527 01/25/23  0522 12/22/22  1108 12/21/22  1315 12/17/22  0519 12/16/22  2239 12/16/22  1620   WBC 5.7 11.5* 10.7   < > 3.4*   < > 3.9* 4.5   HGB 8.4* 7.7* 8.5*   < > 8.0*   < > 7.9*  7.9* 7.0*   * 103* 100   < > 102*   < > 106* 107*    164 230   < > 75*   < > 94* 111*   INR  --   --   --   --  1.36*  --  1.14 1.58*    < > = values in this interval not displayed.      Recent Labs   Lab Test 02/06/23  0647 02/06/23  0600 02/05/23  1203 01/30/23  0758 01/30/23  0512 01/28/23  0733 01/28/23  0609   NA  --  140  --   --  139  --  137   POTASSIUM  --  3.8  --   --  4.3  --  4.5   CHLORIDE  --  96*  --   --  96*  --  97*   CO2  --  30*  --   --  30*  --  32*   BUN  --  44.1*  --   --  45.2*  --  23.6*   CR  --  3.97*  --   --  3.99*  --  2.54*   ANIONGAP  --  14  --   --  13  --  8   KELLY  --  9.5  --   --  9.2  --  8.6   * 120* 131*   < > 111*   < > 93    < > = values in this interval not displayed.     No lab results found.    Invalid input(s): TROP, TROPONINIES

## 2023-02-14 ENCOUNTER — APPOINTMENT (OUTPATIENT)
Dept: SPEECH THERAPY | Facility: CLINIC | Age: 58
DRG: 981 | End: 2023-02-14
Attending: STUDENT IN AN ORGANIZED HEALTH CARE EDUCATION/TRAINING PROGRAM
Payer: COMMERCIAL

## 2023-02-14 ENCOUNTER — APPOINTMENT (OUTPATIENT)
Dept: PHYSICAL THERAPY | Facility: CLINIC | Age: 58
DRG: 981 | End: 2023-02-14
Attending: STUDENT IN AN ORGANIZED HEALTH CARE EDUCATION/TRAINING PROGRAM
Payer: COMMERCIAL

## 2023-02-14 LAB — AMMONIA PLAS-SCNC: 29 UMOL/L (ref 16–60)

## 2023-02-14 PROCEDURE — 250N000013 HC RX MED GY IP 250 OP 250 PS 637: Performed by: STUDENT IN AN ORGANIZED HEALTH CARE EDUCATION/TRAINING PROGRAM

## 2023-02-14 PROCEDURE — 250N000009 HC RX 250: Performed by: STUDENT IN AN ORGANIZED HEALTH CARE EDUCATION/TRAINING PROGRAM

## 2023-02-14 PROCEDURE — 250N000013 HC RX MED GY IP 250 OP 250 PS 637

## 2023-02-14 PROCEDURE — 99231 SBSQ HOSP IP/OBS SF/LOW 25: CPT | Performed by: HOSPITALIST

## 2023-02-14 PROCEDURE — 97530 THERAPEUTIC ACTIVITIES: CPT | Mod: GP

## 2023-02-14 PROCEDURE — 250N000012 HC RX MED GY IP 250 OP 636 PS 637: Performed by: STUDENT IN AN ORGANIZED HEALTH CARE EDUCATION/TRAINING PROGRAM

## 2023-02-14 PROCEDURE — 250N000013 HC RX MED GY IP 250 OP 250 PS 637: Performed by: INTERNAL MEDICINE

## 2023-02-14 PROCEDURE — 36415 COLL VENOUS BLD VENIPUNCTURE: CPT | Performed by: HOSPITALIST

## 2023-02-14 PROCEDURE — G0463 HOSPITAL OUTPT CLINIC VISIT: HCPCS

## 2023-02-14 PROCEDURE — 120N000001 HC R&B MED SURG/OB

## 2023-02-14 PROCEDURE — 999N000040 HC STATISTIC CONSULT NO CHARGE VASC ACCESS

## 2023-02-14 PROCEDURE — 92526 ORAL FUNCTION THERAPY: CPT | Mod: GN | Performed by: SPEECH-LANGUAGE PATHOLOGIST

## 2023-02-14 PROCEDURE — 99232 SBSQ HOSP IP/OBS MODERATE 35: CPT | Performed by: INTERNAL MEDICINE

## 2023-02-14 PROCEDURE — 82140 ASSAY OF AMMONIA: CPT | Performed by: HOSPITALIST

## 2023-02-14 PROCEDURE — 999N000128 HC STATISTIC PERIPHERAL IV START W/O US GUIDANCE

## 2023-02-14 RX ADMIN — FOLIC ACID 1 MG: 1 TABLET ORAL at 08:30

## 2023-02-14 RX ADMIN — LEVETIRACETAM 1000 MG: 100 SOLUTION ORAL at 22:37

## 2023-02-14 RX ADMIN — APIXABAN 5 MG: 5 TABLET, FILM COATED ORAL at 08:30

## 2023-02-14 RX ADMIN — RIFAXIMIN 550 MG: 550 TABLET ORAL at 08:30

## 2023-02-14 RX ADMIN — OLANZAPINE 5 MG: 5 TABLET, ORALLY DISINTEGRATING ORAL at 10:02

## 2023-02-14 RX ADMIN — LIDOCAINE 1 PATCH: 560 PATCH PERCUTANEOUS; TOPICAL; TRANSDERMAL at 08:37

## 2023-02-14 RX ADMIN — Medication 40 MG: at 08:31

## 2023-02-14 RX ADMIN — OLANZAPINE 5 MG: 5 TABLET, ORALLY DISINTEGRATING ORAL at 22:39

## 2023-02-14 RX ADMIN — OLANZAPINE 5 MG: 5 TABLET, ORALLY DISINTEGRATING ORAL at 15:57

## 2023-02-14 RX ADMIN — WHITE PETROLATUM: 1.75 OINTMENT TOPICAL at 08:38

## 2023-02-14 RX ADMIN — LACTULOSE 10 G: 10 SOLUTION ORAL at 08:45

## 2023-02-14 RX ADMIN — TACROLIMUS 1 MG: 5 CAPSULE ORAL at 00:20

## 2023-02-14 RX ADMIN — TACROLIMUS 1 MG: 5 CAPSULE ORAL at 08:31

## 2023-02-14 RX ADMIN — MIDODRINE HYDROCHLORIDE 10 MG: 5 TABLET ORAL at 14:06

## 2023-02-14 RX ADMIN — RIFAXIMIN 550 MG: 550 TABLET ORAL at 20:44

## 2023-02-14 RX ADMIN — MELATONIN TAB 3 MG 3 MG: 3 TAB at 22:39

## 2023-02-14 RX ADMIN — LACOSAMIDE 100 MG: 10 SOLUTION ORAL at 09:20

## 2023-02-14 RX ADMIN — APIXABAN 5 MG: 5 TABLET, FILM COATED ORAL at 20:44

## 2023-02-14 RX ADMIN — MIDODRINE HYDROCHLORIDE 10 MG: 5 TABLET ORAL at 17:31

## 2023-02-14 RX ADMIN — Medication 40 MG: at 17:32

## 2023-02-14 RX ADMIN — LACOSAMIDE 100 MG: 10 SOLUTION ORAL at 00:20

## 2023-02-14 RX ADMIN — MIDODRINE HYDROCHLORIDE 10 MG: 5 TABLET ORAL at 08:30

## 2023-02-14 ASSESSMENT — ACTIVITIES OF DAILY LIVING (ADL)
ADLS_ACUITY_SCORE: 67

## 2023-02-14 NOTE — PLAN OF CARE
"Goal Outcome Evaluation:      Plan of Care Reviewed With: patient    Overall Patient Progress: no changeOverall Patient Progress: no change     SUMMARY: Bilateral pleural effusions, encephalopathy   DATE & TIME: 02/13-02/14 [id number]  Cognitive Concerns/ Orientation : Unable to assess orientation- pt uncooperative- would respond \"no\" to questions   BEHAVIOR & AGGRESSION TOOL COLOR: green. Yellow during cares.   CIWA SCORE: NA  ABNL VS/O2: Unable to assess- pt combative and uncooperative.   MOBILITY: Total, up with lift- Pt weight shifting in bed, would remove pillows, needing to be boosted more frequently.   PAIN MANAGMENT: denies   DIET: soft, bite sized with moderately thick liqiuds, Total feed, Bolus tube feeding TID if less than 50% meals eaten.  BOWEL/BLADDER: incontinent, Dialysis patient. Minimal urine output. Loose/ soft Bm x5   ABNL LAB/BG: Kidney function declining- ESRD- on dialysis   DRAIN/DEVICES: extended PIV on right arm, PEG tube- clamped, right Tunnel cath for dialysis.  TELEMETRY RHYTHM: NA  SKIN: scattered bruises/scabs, blanchable redness to sacrum- skin intact. Pt soiling too much to keep mepilex intact. Foam dressing to elbow. Old trach site dressing c/d/I.   TESTS/PROCEDURES: none  D/C DATE: pending progress.  OTHER IMPORTANT INFO: Patient hasn't attempted to pull at IV sites. No mitts utilized this shift. Sitter at bed side. M/W/F dialysis. Nephrology following. Pt uncooperative during cares, assistance of 2-3 needed at times. Unable to assess trach site- pt attempted to bite. Pt becoming more active this shift. Restless overnight, patient did not sleep much. Fall occurred.       "

## 2023-02-14 NOTE — PROGRESS NOTES
Patient had a witnessed fall around 0220. Patient had a long arm overnight. Was reported that patient had managed to get his legs over the side rails. Long arm had witnessed fall in progress but could not reach patient in time. No injuries noted. Pt was assisted back into bed via ceiling lift.

## 2023-02-14 NOTE — PROGRESS NOTES
Potassium   Date Value Ref Range Status   02/13/2023 4.7 3.4 - 5.3 mmol/L Final   12/29/2022 3.4 3.4 - 5.3 mmol/L Final   04/20/2020 3.9 3.4 - 5.3 mmol/L Final     Potassium POCT   Date Value Ref Range Status   01/19/2023 3.6 3.5 - 5.0 mmol/L Final     Hemoglobin   Date Value Ref Range Status   02/13/2023 7.7 (L) 13.3 - 17.7 g/dL Final   04/20/2020 14.3 13.3 - 17.7 g/dL Final     Creatinine   Date Value Ref Range Status   02/13/2023 4.07 (H) 0.67 - 1.17 mg/dL Final   04/20/2020 1.06 0.66 - 1.25 mg/dL Final     Urea Nitrogen   Date Value Ref Range Status   02/13/2023 60.2 (H) 6.0 - 20.0 mg/dL Final   12/29/2022 46 (H) 7 - 30 mg/dL Final   04/20/2020 23 7 - 30 mg/dL Final     Sodium   Date Value Ref Range Status   02/13/2023 142 136 - 145 mmol/L Final   04/20/2020 139 133 - 144 mmol/L Final     INR   Date Value Ref Range Status   12/21/2022 1.36 (H) 0.85 - 1.15 Final   10/07/2019 1.20 (H) 0.86 - 1.14 Final       DIALYSIS PROCEDURE NOTE  Hepatitis status of previous patient on machine log was checked and verified ok to use with this patients hepatitis status.  Patient dialyzed for 3 hrs. on a K3 bath with a net fluid removal of  1.5L.  A BFR of 400 ml/min was obtained via a CVC.      The treatment plan was discussed with Dr. Reddy during the treatment.    Total heparin received during the treatment: 0 units.     Line flushed, clamped and capped with heparin 1:1000 1.6 mL (1600 units) per lumen    Meds  given: epogen, albumin  Complications: none      Person educated: pre-treatment. Knowledge base minimal. Barriers to learning: confusion. Educated on procedure via verbal mode. Patient verbalized understanding. Pt prefers verbal education style.     ICEBOAT? Timeout performed pre-treatment  I: Patient was identified using 2 identifiers  C:  Consent Signed Yes  E: Equipment preventative maintenance is current and dialysis delivery system OK to use  B: Hepatitis B Surface Antigen: negative; Draw Date: 02/02/2023       Hepatitis B Surface Antibody: susceptible; Draw Date: 02/02/2023  O: Dialysis orders present and complete prior to treatment  A: Vascular access verified and assessed prior to treatment  T: Treatment was performed at a clinically appropriate time  ?: Patient was allowed to ask questions and address concerns prior to treatment  See Adult Hemodialysis flowsheet in [facility] for further details and post assessment.  Machine water alarm in place and functioning. Transducer pods intact and checked every 15min.   Pt returned via bed.  Chlorine/Chloramine water system checked every 4 hours.  Outpatient Dialysis at * not established    Patient repositioned every 2 hours during the treatment.  Post treatment report given to GERTRUDE Schwartz RN regarding 1.5L of fluid removed, last BP of 95/65, and patient pain rating of 8/10.

## 2023-02-14 NOTE — PROGRESS NOTES
Renal Medicine Progress Note            Assessment/Plan:     1.  ESKD.  Pt dialysing per a MWF schedule.  No issues today.  He is using midodrine + alb prime and BP is ok today.  Tolerating fluid removal s probs.  A.  Next HD on Weds.  B.  Continue midodrine + alb prime each run.     2.  Pruritis.  This is still an issue.  A.  Continue urea cream prn.     3.  Anemia.  Pt's hb is low.  A.  EPO 20k qHD.     4.  FEN.  Pt is on intermittent TF + trying to eat as well.  Electrolytes are ok.  A.  Same plan c nutrition.    Plan:  # HD order placed for tomorrow        Interval History:     Patient looks much better than when I last saw him.  Wife is at the bedside.  No new issues.   Awaiting TCU          Medications and Allergies:       - MEDICATION INSTRUCTIONS for Dialysis Patients -   Does not apply See Admin Instructions     apixaban ANTICOAGULANT  5 mg Oral BID     folic acid  1 mg Oral or Feeding Tube Daily     lacosamide  100 mg Oral or Feeding Tube BID     lactulose  10 g Oral BID     levETIRAcetam  1,000 mg Oral or Feeding Tube Q24H     lidocaine  1 patch Transdermal Q24H     lidocaine   Transdermal Q8H BIJU     melatonin  3 mg Oral At Bedtime     midodrine  10 mg Oral or Feeding Tube TID w/meals     mineral oil-hydrophilic petrolatum   Topical Daily     nystatin  500,000 Units Swish & Spit 4x Daily     OLANZapine zydis  5 mg Oral At Bedtime     pantoprazole  40 mg Oral or Feeding Tube BID AC     rifaximin  550 mg Oral or Feeding Tube BID     sodium chloride (PF)  10-30 mL Intracatheter Q8H     sodium chloride (PF)  3 mL Intracatheter Q8H     tacrolimus  1 mg Oral BID IS      No Known Allergies         Physical Exam:   Vitals were reviewed   , Blood pressure 93/64, pulse 94, temperature 98.1  F (36.7  C), temperature source Oral, resp. rate 22, weight 86 kg (189 lb 9.5 oz), SpO2 96 %.    Wt Readings from Last 3 Encounters:   02/11/23 86 kg (189 lb 9.5 oz)   01/21/23 82.4 kg (181 lb 11.2 oz)   12/28/22 96 kg (211 lb  10.3 oz)       Intake/Output Summary (Last 24 hours) at 2/14/2023 1436  Last data filed at 2/14/2023 1223  Gross per 24 hour   Intake 690 ml   Output --   Net 690 ml       GENERAL APPEARANCE: Looks better-more colors  HEENT:  Eyes/ears/nose/neck grossly normal  RESP: Not labored. No using supplemental O2.   ABDOMEN: obese, soft.   EXTREMITIES/SKIN: no rashes/lesions on observed skin; no edema  NEURO: Awake and alert.          Data:     CBC RESULTS:     Recent Labs   Lab 02/13/23  0850   WBC 5.7   RBC 2.61*   HGB 7.7*   HCT 26.7*   *       Basic Metabolic Panel:  Recent Labs   Lab 02/13/23  0850      POTASSIUM 4.7   CHLORIDE 101   CO2 34*   BUN 60.2*   CR 4.07*   GLC 93   KELLY 9.7       INRNo lab results found in last 7 days.   Attestation:   I have reviewed today's relevant vital signs, notes, medications, labs and imaging.    Zenon Bradshaw MD  [facility] Nephrology  Office phone :[phone]  Pager: [phone]

## 2023-02-14 NOTE — PLAN OF CARE
Goal Outcome Evaluation:     DATE & TIME transfer from [facility]. with bilateral pleural effusion   Cognitive Concerns/ Orientation : A&o x 2, confused   BEHAVIOR & AGGRESSION TOOL COLOR: green  CIWA SCORE: NA  ABNL VS/O2: Soft BP, other VSS, on RA  MOBILITY:Total, up with lift  PAIN MANAGMENT:denies, appears comfortable- lidocane patch placed to lower L back  DIET:soft, bite sized with Moderately thick liqiuds, Total feed, Bolus feeding TID if less than 50% meals eaten- received one bolus during my shift, will be done at 2030  BOWEL/BLADDER:incontinent, Dialysis patient, no urine .  ABNL LAB/BG:nothing new  DRAIN/DEVICES: extended PIV on right arm, PEG tube, right Tunnel cath for dialysis.  TELEMETRY RHYTHM: NA  SKIN:scattered bruises/scabs,Mapilex to coccyx and left elbow. Old trach site dressing c/d/I. Redness on buttocks  TESTS/PROCEDURES: none  D/C DATE:pending progress.  OTHER IMPORTANT INFO:Mitts on to prevent pulling lines,sitter at bed side, M/W/F dialysis- took 1.5L off today, Nephrology following.

## 2023-02-14 NOTE — PROGRESS NOTES
[facility]  Hospitalist Progress Note        Carl Green MD   02/14/2023        Interval History:        - Was dialyzed on 2/13  - Patient had an unwitnessed fall last night after he managed to get his legs over the side rails; was witnessed by the Long arm nursing-- no injuries noted (see nursing note 2/14)  - ammonia on 2/14 normal at 29         Assessment and Plan:        Blanco Osborne is a 57 year-old male admitted on 1/21/2023 as a transfer from [facility] for evaluation of loculated pleural effusion. He has extensive PMH including h/o liver transplant 2016 due to alcohol abuse, pAF on chronic anticoagulation, history of diabetes mellitus type 2, CVA, hypertension, CHRISTIAN on BiPAP.    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  He was recovering in [facility] and was transferred to [facility] for consideration of chest tube placement and possible intrapleural lysis for worsening effusion.      Acute metabolic encephalopathy most likely from hospital delirium  Underlying hepatic encephalopathy  *has had waxing and waning mentation, coinciding with lactulose being held at [facility]  *earlier in hospital stay, remained confused and had pulled out tracheostomy tube, PICC line and pulled his dialysis catheter  - continues with fluctuating mentation  - Re-orient as needed; Maintain normal day/night, sleep wake cycles; Minimize sedating/altering medications as able  - adjusted Lactulose to 10 mg BID (2/9) for significant diarrhea needing rectal tube-- discontinued rectal tube (2/12) once stool more formed; recheck ammonia levels on 2/14 normal at 29  - Zyprexa 5 mg at bedtime scheduled.  Melatonin 3 mg p.o. at bedtime scheduled  - Olanzapine BID PRN   - mittens/sitter as needed     Bilateral  pleural effusions   Acute now chronic respiratory failure requiring tracheostomy  - resolved    Pneumonia  - resolved   ARDS  - resolved    Right pneumothorax - resolved   *Initial event was parainfluenza and strep pneumo pneumonia back in November 2022. Treated for ARDS. Had failed extubation x2 and tracheostomy performed on previous hospitalization. *Had additional complications of bilateral pneumothoraces as well as hydropneumothorax- pleural fluid grew candida parapsilosis  *Has already completed 4-week antifungal treatment. While in LTACH he was successfully weaned from the ventilator.  *Recently there were concern for worsening effusion while at [facility] and had thoracentesis 01/13 which returned exudative without growth on cultures. CT chest/abdomen/pelvis on 01/18 demonstrated worsening effusions and concerns for infected parapneumonic effusions with possible SBP.  Multiple pulmonologist at [facility] reviewed CT scan recommended transfer to [facility] for consideration of chest tube placement and possible intrapleural lysis  *Thoracic surgery (Dr. Jackson) consulted during admission   *CT chest 1/22, small effusions on imaging and so chest tube was not inserted.   *Patient pulled out his tracheostomy tube on the night 2/1-2/2 and is tolerating well without increased shortness of breath persistent cough or worsening hypoxia  - Respiratory status stable on room air  - Wound care consult for tracheostomy site care      Goals of care   As per report on 1/25 nursing had mentioned that patient had refused to undergo dialysis and want to stop care, palliative care was consulted.  Patient and his spouse denied wanting to stop care and wanted ongoing restorative care including full code     ESRD  Now on HD.  No return of renal function.  - Nephrology following, continues on M-W-F schedule; last dialyzed 2/13      S/p Liver transplant 2016   Cirrhosis with ascites  Subacute bacterial peritonitis  *Main manifestations of this  are hepatic encephalopathy and ascites.  Hepatologist at [facility] felt that most likely reason for the cirrhosis was metabolic syndrome or alcohol intake.  There was no evidence of rejection on biopsy and there was some evidence of regeneration. Paracentesis done on 12/22/2022 showed lactobacillus indicating SBP, treated with Unasyn and Augmentin, finished 2-week course 1/12/2023.  Requires large-volume paracentesis periodically for recurring ascites.    *Paracentesis 1/13/2023 yielded about 7500 cc. Cultures NGTD.  Most recent paracentesis on 1/24 with 4.8 L fluid removal  - Continue intermittent paracentesis as needed if develops symptomatic ascites.   - Further treatment for recurrent ascites with TIPS deferred to his Liver Transplant team and/or Hepatology, he has an appointment on 4/21/2023 with Hepatology, Dr. Simms  - Continue Tacrolimus 1 mg BID.  - Continue rifaximin   - Lactulose dose reduced as noted above     Diarrhea---improved  - C difficile negative on 1/31. Secondary to lactulose.  - discontinued rectal tube (2/12) as stools more formed  -continue lactulose with goal of 2 to 3 soft bowel movement in 24 hours     Paroxysmal atrial fibrillation  Remains in sinus rhythm.  On anticoagulation with apixaban indefinitely for stroke prophylaxis.       Acute anemia  Pt has required intermittent transfusions for on-going anemia.   - Anemia most likely secondary to critical illness/chronic disease, chronic renal disease  - baseline around 7-8; Hemoglobin stable, 7.7 on 2/13  - Transfuse packed red blood cells to keep Hb> 7     History PEA arrest   PEA arrest prior to his previous admission and 1 episode of PEA associated with desaturation on 12/20.  No structural cardiac disease.   - Continue Cardiac Monitoring     Chronic Hypotension  - has developed baseline hypotension 2/2 cirrhosis and prolonged critical illness.   - Continue midodrine       H/o Seizure after hypoxic event  This is in the context of  baseline multifactorial encephalopathy secondary to his ongoing critical illness and prior cardiac arrests and prior strokes and cirrhosis with hepatic encephalopathy. MRI of head of 12/20/22 reveals no PRES or leptomeningeal enhancement but scattered.  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   - Continue levetiracetam, lacosamide       Diabetes mellitus type 2  *Hemoglobin A1c on November 2022 was 4.7  *PTA looks like was on 30 units of Lantus every morning ; was put on sliding scale insulin here   - BG checks and sliding scale insulin discontinued as has been stable without insulin needs     Severe malnutrition, protein and calorie type  Moderate dysphagia.  - Continue with tube feeds via PEG tube  - IR replaced the PEG tube on 2/8/2023  - Nutritionist consulted and following for tube feeds  - SLP following as well     Anxiety  Fluoxetine was discontinued as per psychiatry recommendation on 2/2 (see consult note for details)         Clinically Significant Risk Factors                 # Hypoalbuminemia: Lowest albumin = 1.9 g/dL at 1/23/2023  6:38 AM, will monitor as appropriate            # Severe Malnutrition: based on nutrition assessment     Diet: Room Service  Adult Formula Bolus Feeding: Novasource Renal; Route: Gastrostomy; 3 times daily; Volume per Bolus: 240; mL(s); 80 mL/hr x 3 hrs back up bolus feeding after each meal ONLY IF INTAKE <50% of meal  Snacks/Supplements Adult: Other; 4 oz Ensure with breakfast, Gelatein+ with lunch and magic cup with dinner (RD); With Meals  Soft & Bite Sized Diet (level 6) Liquidized/Moderately Thick (level 3) (By tsp only); No Straws      DVT Prophylaxis: on Eliquis  Code Status: Full Code          Disposition Plan        Expected Discharge Date: pending placement to [facility]  -  following for disposition    Clinically Significant Risk Factors              # Hypoalbuminemia: Lowest albumin = 1.9 g/dL at 1/23/2023  6:38  AM, will monitor as appropriate   # Thrombocytopenia: Lowest platelets = 126 in last 2 days, will monitor for bleeding          # Severe Malnutrition: based on nutrition assessment        Page Me (7 am to 6 pm)    Given discussed with nursing patient and his wife present in room                Physical Exam:      Blood pressure 93/64, pulse 94, temperature 98.1  F (36.7  C), temperature source Oral, resp. rate 22, weight 86 kg (189 lb 9.5 oz), SpO2 96 %.  Vitals:    01/31/23 0510 02/10/23 0425 02/11/23 0339   Weight: 84.5 kg (186 lb 4.6 oz) 84.6 kg (186 lb 8.2 oz) 86 kg (189 lb 9.5 oz)     Vital Signs with Ranges  Temp:  [98.1  F (36.7  C)-98.4  F (36.9  C)] 98.1  F (36.7  C)  Pulse:  [] 94  Resp:  [5-39] 22  BP: ()/(58-78) 93/64  SpO2:  [96 %-99 %] 96 %  I/O's Last 24 hours  I/O last 3 completed shifts:  In: 580 [P.O.:300; NG/GT:280]  Out: 1500 [Other:1500]    Constitutional: Alert, awake and oriented X 2, disoriented with time/situation ; resting comfortably in no apparent distress       Oral cavity: Moist mucosa   Cardiovascular: Normal s1 s2, regular rate and rhythm, no murmur   Lungs: B/l clear to auscultation, no wheezes or crepitations   Abdomen: Soft, nt, nd, no guarding, rigidity or rebound; BS +   LE : No edema   Musculoskeletal:  moving all extremities equally   Neuro: No focal neurological deficits noted   Psychiatry: normal mood and affect, confused                Medications:          - MEDICATION INSTRUCTIONS for Dialysis Patients -   Does not apply See Admin Instructions     apixaban ANTICOAGULANT  5 mg Oral BID     folic acid  1 mg Oral or Feeding Tube Daily     lacosamide  100 mg Oral or Feeding Tube BID     lactulose  10 g Oral BID     levETIRAcetam  1,000 mg Oral or Feeding Tube Q24H     lidocaine  1 patch Transdermal Q24H     lidocaine   Transdermal Q8H BIJU     melatonin  3 mg Oral At Bedtime     midodrine  10 mg Oral or Feeding Tube TID w/meals     mineral oil-hydrophilic petrolatum    Topical Daily     nystatin  500,000 Units Swish & Spit 4x Daily     OLANZapine zydis  5 mg Oral At Bedtime     pantoprazole  40 mg Oral or Feeding Tube BID AC     rifaximin  550 mg Oral or Feeding Tube BID     sodium chloride (PF)  10-30 mL Intracatheter Q8H     sodium chloride (PF)  3 mL Intracatheter Q8H     tacrolimus  1 mg Oral BID IS     PRN Meds: sodium chloride 0.9%, acetaminophen **OR** acetaminophen, acetylcysteine, albuterol, calcium carbonate, artificial tears, glucose **OR** dextrose **OR** glucagon, ipratropium - albuterol 0.5 mg/2.5 mg/3 mL, lidocaine 4%, lidocaine (buffered or not buffered), melatonin, mineral oil-hydrophilic petrolatum, naloxone **OR** naloxone **OR** naloxone **OR** naloxone, nitroGLYcerin, OLANZapine zydis, ondansetron **OR** ondansetron, oxyCODONE, simethicone, sodium chloride (PF), urea         Data:      All new lab and imaging data was reviewed.   Recent Labs   Lab Test 02/13/23  0850 02/06/23  0600 01/31/23  1527 12/22/22  1108 12/21/22  1315 12/17/22  0519 12/16/22  2239 12/16/22  1620   WBC 5.7 5.7 11.5*   < > 3.4*   < > 3.9* 4.5   HGB 7.7* 8.4* 7.7*   < > 8.0*   < > 7.9*  7.9* 7.0*   * 103* 103*   < > 102*   < > 106* 107*   * 165 164   < > 75*   < > 94* 111*   INR  --   --   --   --  1.36*  --  1.14 1.58*    < > = values in this interval not displayed.      Recent Labs   Lab Test 02/13/23  0850 02/06/23  0647 02/06/23  0600 01/30/23  0758 01/30/23  0512     --  140  --  139   POTASSIUM 4.7  --  3.8  --  4.3   CHLORIDE 101  --  96*  --  96*   CO2 34*  --  30*  --  30*   BUN 60.2*  --  44.1*  --  45.2*   CR 4.07*  --  3.97*  --  3.99*   ANIONGAP 7  --  14  --  13   KELLY 9.7  --  9.5  --  9.2   GLC 93 104* 120*   < > 111*    < > = values in this interval not displayed.     No lab results found.    Invalid input(s): TROP, TROPONINIES

## 2023-02-14 NOTE — PROGRESS NOTES
[facility] Nurse Inpatient Assessment     Consulted for: Coccyx and Trach    Patient History (according to provider note(s):      Blanco Osborne is a 57 year old male admitted on 1/21/2023 as a transfer from [facility] for evaluation of loculated pleural effusion. Patient has multiple medical problems including history of liver transplant 2016 due to alcohol abuse, pAF on chronic anticoagulation, history of DM2, CVA, HTN, CHRISTIAN on BiPAP. 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 Discharge to [facility], back to [facility] and back to [facility] during month of December.     Recently while at [facility] CT chest/abdomen/pelvis on 01/18 demonstrated concerns for infected parapneumonic effusions and SBP.  CT findings discussed with Pulmonology team with 3 different pulmonologist including (Dr. Monge, Dr. Chu and Dr. Jay) with agreement that patient needs chest tube and possible lytics and therefore transfer to higher level of care.     Areas Assessed:      Areas visualized during today's visit: Sacrum/coccyx    Wound location: coccyx    Last photo: none taken  Wound due to: Pressure Injury   Wound history/plan of care: 2 of the 3 small wounds have healed with one remaining over the coccyx. DTPI vs purpura POA. Pt laying on his left side during visit which he reports is the position he is most comfortable in.  Wound base:  100% intact epidermis, no erythema     Palpation of the wound bed: normal      Drainage: none     Description of drainage: none     Measurements (length x width x depth, in cm): NA     Tunneling: N/A     Undermining: N/A  Periwound skin: Intact      Color: normal and consistent with surrounding tissue      Temperature: normal   Odor: none  Pain: denies  Pain interventions prior to dressing change: patient  "tolerated well  Treatment goal: Protection  STATUS: healed  Supplies ordered: at bedside and supplies stored on unit       Wound location: Midline low back    Last photo: 2/7/23 1/30/23        Wound due to: Skin Tear  Wound history/plan of care: Pt has purpura all over body and appears skin was torn open.   Wound base: 100 % superficial scab     Palpation of the wound bed: normal      Drainage: none     Description of drainage: none     Measurements (length x width x depth, in cm): 1.0  x 1.2  x  0 cm      Tunneling: N/A     Undermining: N/A  Periwound skin: Intact      Color: normal and consistent with surrounding tissue      Temperature: normal   Odor: none  Pain: denies , none  Pain interventions prior to dressing change: N/A  Treatment goal: Heal   STATUS: healing  Supplies ordered: at bedside    Wound location:  [facility] site     2-3-23 left of trach site     Wound due to: tracheostomy  Wound history/plan of care: pt pulled out [facility] 2/1, chart reviewed, no plan for replacement at this time as pt tolerating well  Wound base: 100% intact epidermis     Palpation of the wound bed: normal      Drainage: none     Description of drainage: none     Measurements (length x width x depth, in cm): NA  Periwound skin: intact, scattered purpura and bruising      Color: normal and consistent with surrounding tissue, scattered purple/maroon      Temperature: normal   Odor: none  Pain: denies     STATUS: Healed   Supplies ordered: supplies stored on unit         Treatment Plan:       Pressure Injury Prevention (PIP) Plan:  If patient is declining pressure injury prevention interventions: Explore reason why and address patient's concerns, Educate on pressure injury risk and prevention intervention(s), If patient is still declining, document \"informed refusal\"  and Ensure Care team is aware ( provider, charge nurse, etc)  Mattress: Follow bed algorithm, reassess daily and order specialty mattress, if indicated.  HOB: " Maintain at or below 30 degrees, unless contraindicated  Repositioning in bed: Every 1-2 hours , Left/right positioning; avoid supine and Raise foot of bed prior to raising head of bed, to reduce patient sliding down (shear)  Heels: Keep elevated off mattress, Pillows under calves and Heel lift boots  Protective Dressing: Sacral Mepilex for prevention (#[id number]),  especially for the agitated patient   Positioning Equipment: None  Chair positioning: Chair cushion (#011472)  and Assist patient to reposition hourly   If patient has a buttock pressure injury, or high risk for PI use chair cushion or SPS.  Moisture Management: Perineal cleansing /protection: Follow Incontinence Protocol, Avoid brief in bed, Clean and dry skin folds with bathing , Consider InterDry (#976261) between folds and Moisturize dry skin  Under Devices: Inspect skin under all medical devices during skin inspection , Ensure tubes are stabilized without tension and Ensure patient is not lying on medical devices or equipment when repositioned  Ask provider to discontinue device when no longer needed.      Orders: Reviewed and Updated    RECOMMEND PRIMARY TEAM ORDER: None, at this time  Education provided: importance of repositioning, plan of care, wound progress and Off-loading pressure  Discussed plan of care with: Patient and Nurse  WOC nurse follow-up plan: signing off  Notify WOC if wound(s) deteriorate.  Nursing to notify the Provider(s) and re-consult the WOC Nurse if new skin concern.    DATA:     Current support surface: Standard  Low air loss (MIQUEL pump, Isolibrium, Pulsate, skin guard, etc)  Containment of urine/stool: Internal fecal management  BMI: Body mass index is 25.71 kg/m .   Active diet order: Orders Placed This Encounter      Soft & Bite Sized Diet (level 6) Liquidized/Moderately Thick (level 3) (By tsp only); No Straws     Output: I/O last 3 completed shifts:  In: 580 [P.O.:300; NG/GT:280]  Out: 1500 [Other:1500]     Labs:   Recent  Labs   Lab 02/13/23  0850   HGB 7.7*   WBC 5.7     Pressure injury risk assessment:   Sensory Perception: 3-->slightly limited  Moisture: 4-->rarely moist  Activity: 2-->chairfast  Mobility: 3-->slightly limited  Nutrition: 3-->adequate  Friction and Shear: 2-->potential problem  Kareem Score: 17    Laith Rahman RN CWOCN  -Securely message with behaview (more info) - can reach individually by name or search 'WOC Nurse' (Jacobo) to reach all current WOCs on duty.  -WOC Office Phone: [phone]

## 2023-02-15 ENCOUNTER — APPOINTMENT (OUTPATIENT)
Dept: SPEECH THERAPY | Facility: CLINIC | Age: 58
DRG: 981 | End: 2023-02-15
Attending: STUDENT IN AN ORGANIZED HEALTH CARE EDUCATION/TRAINING PROGRAM
Payer: COMMERCIAL

## 2023-02-15 PROCEDURE — 120N000001 HC R&B MED SURG/OB

## 2023-02-15 PROCEDURE — 250N000013 HC RX MED GY IP 250 OP 250 PS 637: Performed by: INTERNAL MEDICINE

## 2023-02-15 PROCEDURE — 258N000003 HC RX IP 258 OP 636: Performed by: INTERNAL MEDICINE

## 2023-02-15 PROCEDURE — 250N000013 HC RX MED GY IP 250 OP 250 PS 637

## 2023-02-15 PROCEDURE — 634N000001 HC RX 634: Performed by: INTERNAL MEDICINE

## 2023-02-15 PROCEDURE — 250N000013 HC RX MED GY IP 250 OP 250 PS 637: Performed by: STUDENT IN AN ORGANIZED HEALTH CARE EDUCATION/TRAINING PROGRAM

## 2023-02-15 PROCEDURE — P9045 ALBUMIN (HUMAN), 5%, 250 ML: HCPCS | Performed by: INTERNAL MEDICINE

## 2023-02-15 PROCEDURE — 90937 HEMODIALYSIS REPEATED EVAL: CPT

## 2023-02-15 PROCEDURE — 99233 SBSQ HOSP IP/OBS HIGH 50: CPT | Performed by: HOSPITALIST

## 2023-02-15 PROCEDURE — 250N000011 HC RX IP 250 OP 636: Performed by: INTERNAL MEDICINE

## 2023-02-15 PROCEDURE — 92526 ORAL FUNCTION THERAPY: CPT | Mod: GN | Performed by: SPEECH-LANGUAGE PATHOLOGIST

## 2023-02-15 PROCEDURE — 250N000012 HC RX MED GY IP 250 OP 636 PS 637: Performed by: STUDENT IN AN ORGANIZED HEALTH CARE EDUCATION/TRAINING PROGRAM

## 2023-02-15 PROCEDURE — 90935 HEMODIALYSIS ONE EVALUATION: CPT | Performed by: INTERNAL MEDICINE

## 2023-02-15 PROCEDURE — 250N000013 HC RX MED GY IP 250 OP 250 PS 637: Performed by: HOSPITALIST

## 2023-02-15 PROCEDURE — 250N000009 HC RX 250: Performed by: STUDENT IN AN ORGANIZED HEALTH CARE EDUCATION/TRAINING PROGRAM

## 2023-02-15 RX ORDER — ALBUMIN (HUMAN) 12.5 G/50ML
50 SOLUTION INTRAVENOUS
Status: DISCONTINUED | OUTPATIENT
Start: 2023-02-15 | End: 2023-02-17

## 2023-02-15 RX ADMIN — TACROLIMUS 1 MG: 5 CAPSULE ORAL at 07:58

## 2023-02-15 RX ADMIN — MIDODRINE HYDROCHLORIDE 10 MG: 5 TABLET ORAL at 17:06

## 2023-02-15 RX ADMIN — MIDODRINE HYDROCHLORIDE 10 MG: 5 TABLET ORAL at 12:59

## 2023-02-15 RX ADMIN — LACTULOSE 10 G: 10 SOLUTION ORAL at 12:59

## 2023-02-15 RX ADMIN — RIFAXIMIN 550 MG: 550 TABLET ORAL at 19:49

## 2023-02-15 RX ADMIN — Medication 6 MG: at 03:05

## 2023-02-15 RX ADMIN — LEVETIRACETAM 1000 MG: 100 SOLUTION ORAL at 22:22

## 2023-02-15 RX ADMIN — TACROLIMUS 1 MG: 5 CAPSULE ORAL at 00:01

## 2023-02-15 RX ADMIN — LACOSAMIDE 100 MG: 10 SOLUTION ORAL at 00:01

## 2023-02-15 RX ADMIN — RIFAXIMIN 550 MG: 550 TABLET ORAL at 12:58

## 2023-02-15 RX ADMIN — FOLIC ACID 1 MG: 1 TABLET ORAL at 12:58

## 2023-02-15 RX ADMIN — LACOSAMIDE 100 MG: 10 SOLUTION ORAL at 13:00

## 2023-02-15 RX ADMIN — IRON SUCROSE 100 MG: 20 INJECTION, SOLUTION INTRAVENOUS at 09:54

## 2023-02-15 RX ADMIN — MELATONIN TAB 3 MG 3 MG: 3 TAB at 19:48

## 2023-02-15 RX ADMIN — APIXABAN 5 MG: 5 TABLET, FILM COATED ORAL at 07:58

## 2023-02-15 RX ADMIN — Medication 40 MG: at 17:06

## 2023-02-15 RX ADMIN — HEPARIN SODIUM 1900 UNITS: 1000 INJECTION INTRAVENOUS; SUBCUTANEOUS at 10:00

## 2023-02-15 RX ADMIN — Medication 40 MG: at 07:58

## 2023-02-15 RX ADMIN — EPOETIN ALFA-EPBX 20000 UNITS: 10000 INJECTION, SOLUTION INTRAVENOUS; SUBCUTANEOUS at 09:51

## 2023-02-15 RX ADMIN — WHITE PETROLATUM: 1.75 OINTMENT TOPICAL at 12:59

## 2023-02-15 RX ADMIN — ALBUMIN HUMAN 250 ML: 0.05 INJECTION, SOLUTION INTRAVENOUS at 09:49

## 2023-02-15 RX ADMIN — LIDOCAINE 1 PATCH: 560 PATCH PERCUTANEOUS; TOPICAL; TRANSDERMAL at 12:58

## 2023-02-15 RX ADMIN — OLANZAPINE 5 MG: 5 TABLET, ORALLY DISINTEGRATING ORAL at 17:07

## 2023-02-15 RX ADMIN — HEPARIN SODIUM 1900 UNITS: 1000 INJECTION INTRAVENOUS; SUBCUTANEOUS at 09:59

## 2023-02-15 RX ADMIN — APIXABAN 5 MG: 5 TABLET, FILM COATED ORAL at 19:47

## 2023-02-15 RX ADMIN — SODIUM CHLORIDE 300 ML: 9 INJECTION, SOLUTION INTRAVENOUS at 09:48

## 2023-02-15 RX ADMIN — OLANZAPINE 5 MG: 5 TABLET, ORALLY DISINTEGRATING ORAL at 22:22

## 2023-02-15 RX ADMIN — MIDODRINE HYDROCHLORIDE 10 MG: 5 TABLET ORAL at 07:58

## 2023-02-15 RX ADMIN — LACTULOSE 10 G: 10 SOLUTION ORAL at 19:47

## 2023-02-15 ASSESSMENT — ACTIVITIES OF DAILY LIVING (ADL)
ADLS_ACUITY_SCORE: 61

## 2023-02-15 NOTE — PLAN OF CARE
Goal Outcome Evaluation:      Plan of Care Reviewed With: patient    SUMMARY: Bilateral pleural effusions, encephalopathy   DATE & TIME: 02/14 3862-4407  Cognitive Concerns/ Orientation : AOx3- situation off, mentation waxes and wanes. At times whispers and has illogical speech  BEHAVIOR & AGGRESSION TOOL COLOR: green. Previously was in yellow w/ cares  ABNL VS/O2: Unable to assess- pt combative and uncooperative.   MOBILITY: Total, up with lift- Was providing repositioning Q2hrs but Pt increasing  weight shifting in bed on his own, would remove pillows, and needing to be boosted more frequently. Pt up in chair multiple times this shift  PAIN MANAGMENT: denies   DIET: soft, bite sized with moderately thick liqiuds, Total feed, Bolus tube feeding TID if less than 50% meals eaten.  BOWEL/BLADDER: incontinent, Dialysis patient. Very little urine output. Loose/ soft Bm  ABNL LAB/BG: Kidney function declining- ESRD- on dialysis   DRAIN/DEVICES: extended PIV on right pulled by pt, Vascular access obtained to access, PEG tube- clamped, right Tunnel cath for dialysis.  TELEMETRY RHYTHM: NA  SKIN: scattered bruises/scabs, blanchable redness to sacrum- skin intact. Pt soiling too much to keep mepilex intact. Foam dressing to elbow. Old trach site dressing removed, sutures removed 2/14- open to air   TESTS/PROCEDURES: none  D/C DATE: pending progress.  OTHER IMPORTANT INFO: Patient hasn't attempted to pull at IV sites. No mitts utilized this shift. Sitter at bed side. M/W/F dialysis. Nephrology following. Pt uncooperative during cares at times. Pt becoming more active this shift. Patient did not sleep much last night or all day. Fall occurred. PT will be seeing pt on non dialysis days

## 2023-02-15 NOTE — PROGRESS NOTES
Renal Medicine Inpatient Dialysis Note                                Blanco Osborne MRN# [id number]   Age: 57 year old YOB: 1965   Date of Admission: 1/21/2023 Hospital LOS: 25          Assessment/Plan:     Following ARF without recovery/current dialysis dependence       1) End Stage Kidney Disease (CKD and subsequent ATN with non-recovery)  Initially LORETTA, but no renal recovery (anuric/oliguric), now considered ESRD. 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 On midodrine 10mg TID.     2) Anemia,  persistent anemia despite adequate iron stores and high epo dosing suggesting some epo resistance.      3) disposition:   not returning to LTACH. In case not able to go to inpatient rehab, would consider [facility] for TCU/rehab and dialysis. Checking hep B antigen, tristan and core tristan along with quant-gold for potential admission to outpatinet dialysis unit. These will be good for 30 days.      4) Diffuse pruritis  No hyperphosphatemia, though pruritis might be related to his renal disease. Ordered urea cream to trial.      Other:  Hx liver transplant 2016  Confusion/encephlopathy  DM  AMS, delirium      MWF schedule next 02/17/23  In current state not outpatient dialysis candidate       Interval History:     Dialysis run parameters reviewed with dialysis RN at patient bedside.    Patient comfortable  Limited patient interaction   Not answering questions       3 hours  2K  5% albumin prime  UF 2 liters as tolerated  NORA    Stable mid run      ROS     ROS unable as limited by patient condition       Dialysis Parameters:     Vitals were reviewed  Patient Vitals for the past 8 hrs:   BP Temp Temp src Pulse Resp   02/15/23 0900 103/72 -- -- 96 --   02/15/23 0845 108/77 -- -- 98 --   02/15/23 0830 103/72 -- -- 98 --   02/15/23 0815 100/74 -- -- 98 --   02/15/23 0800 103/72 -- -- 98 --   02/15/23 0730 98/63 98.7  F  (37.1  C) Axillary 97 15     I/O last 3 completed shifts:  In: 755 [P.O.:530; NG/GT:225]  Out: -     Vitals:    01/28/23 0500 01/30/23 0500 01/31/23 0510 02/10/23 0425   Weight: 88 kg (194 lb 0.1 oz) 86.4 kg (190 lb 7.6 oz) 84.5 kg (186 lb 4.6 oz) 84.6 kg (186 lb 8.2 oz)    02/11/23 0339   Weight: 86 kg (189 lb 9.5 oz)       Current Weight: 86  Dry Weight: 84  Dialysis Temp: 36.5  C  Access Device: IJ  Access Site: right  Dialyzer: Revaclear  Dialysis Bath: 2  Sodium Profile: n  UF Goal: 2  Blood Flow Rate (mL/min): 400  Total Treatment Time (hrs): 3  Heparin: Low dose as required      EPO dose: y  Zemplar: n  IV Fe: n      Medications and Allergies:     Reviewed      Physical Exam:     Seen and examined during course of dialysis run    /72   Pulse 96   Temp 98.7  F (37.1  C) (Axillary)   Resp 15   Wt 86 kg (189 lb 9.5 oz)   SpO2 93%   BMI 25.71 kg/m      GENERAL: not interacting  HEENT: NC/AT  RESP: clear  CV: RRR, normal S1 S2  MS: no edema  SKIN: catheter site clean without drainage    Data:       Recent Labs   Lab 02/13/23  0850      POTASSIUM 4.7   CHLORIDE 101   CO2 34*   ANIONGAP 7   GLC 93   BUN 60.2*   CR 4.07*   GFRESTIMATED 16*   KELLY 9.7     Recent Labs   Lab 02/13/23  0850   PHOS 3.8   HGB 7.7*         G Jose Reilly MD    [facility] - Nephrology  [phone]

## 2023-02-15 NOTE — PROGRESS NOTES
Potassium   Date Value Ref Range Status   02/13/2023 4.7 3.4 - 5.3 mmol/L Final   12/29/2022 3.4 3.4 - 5.3 mmol/L Final   04/20/2020 3.9 3.4 - 5.3 mmol/L Final     Potassium POCT   Date Value Ref Range Status   01/19/2023 3.6 3.5 - 5.0 mmol/L Final     Hemoglobin   Date Value Ref Range Status   02/13/2023 7.7 (L) 13.3 - 17.7 g/dL Final   04/20/2020 14.3 13.3 - 17.7 g/dL Final     Creatinine   Date Value Ref Range Status   02/13/2023 4.07 (H) 0.67 - 1.17 mg/dL Final   04/20/2020 1.06 0.66 - 1.25 mg/dL Final     Urea Nitrogen   Date Value Ref Range Status   02/13/2023 60.2 (H) 6.0 - 20.0 mg/dL Final   12/29/2022 46 (H) 7 - 30 mg/dL Final   04/20/2020 23 7 - 30 mg/dL Final     Sodium   Date Value Ref Range Status   02/13/2023 142 136 - 145 mmol/L Final   04/20/2020 139 133 - 144 mmol/L Final     INR   Date Value Ref Range Status   12/21/2022 1.36 (H) 0.85 - 1.15 Final   10/07/2019 1.20 (H) 0.86 - 1.14 Final       DIALYSIS PROCEDURE NOTE  Hepatitis status of previous patient on machine log was checked and verified ok to use with this patients hepatitis status.  Patient dialyzed for 3 hrs. on a K2 bath with a net fluid removal of  1.5L.  A BFR of 400 ml/min was obtained via a RIJ catheter.    The treatment plan was discussed with Dr. Carrasquillo during the treatment.    Total heparin received during the treatment: 0 units.      Line flushed, clamped and capped with heparin 1:1000 1.6 mL (1600 units) per lumen    Meds  given: EPO 20,000units IV and Venofer 100mg IV   Complications: NONE      Person educated: Pt. Knowledge base Minimal. Barriers to learning: none. Educated on access care via verbal mode. Patient verbalized understanding. Pt prefers verbal education style.     ICEBOAT? Timeout performed pre-treatment  I: Patient was identified using 2 identifiers  C:  Consent Signed Yes  E: Equipment preventative maintenance is current and dialysis delivery system OK to use  B: Hepatitis B Surface Antigen: neg; Draw Date:  2/2/23      Hepatitis B Surface Antibody: Succept; Draw Date: 2/2/23  O: Dialysis orders present and complete prior to treatment  A: Vascular access verified and assessed prior to treatment  T: Treatment was performed at a clinically appropriate time  ?: Patient was allowed to ask questions and address concerns prior to treatment  See Adult Hemodialysis flowsheet in EPIC for further details and post assessment.  Machine water alarm in place and functioning. Transducer pods intact and checked every 15min.   Pt returned via bed  Chlorine/Chloramine water system checked every 4 hours.  Outpatient Dialysis at [facility]    Patient repositioned every 2 hours during the treatment.  Post treatment report given to CODY Peña RN regarding 1.5L of fluid removed, last BP of 102/74, and patient pain rating of 0/10

## 2023-02-15 NOTE — PROGRESS NOTES
[facility]    Medicine Progress Note - Hospitalist Service    Date of Admission:  1/21/2023    Assessment & Plan   Blanco Osborne is a 57 year-old male admitted on 1/21/2023 as a transfer from [facility] for evaluation of loculated pleural effusion. He has extensive PMH including h/o liver transplant 2016 due to alcohol abuse, pAF on chronic anticoagulation, history of diabetes mellitus type 2, CVA, hypertension, CHRISTIAN on BiPAP.  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  He was recovering in [facility] and was transferred to [facility] for consideration of chest tube placement and possible intrapleural lysis for worsening effusion.    Acute metabolic encephalopathy most likely from hospital delirium  Underlying hepatic encephalopathy  Waxing and waning mentation, coinciding with lactulose being held at [facility]  Earlier in hospital stay, remained confused and had pulled out tracheostomy tube, PICC line and pulled his dialysis catheter    Continues with fluctuating mentation, monitor    Re-orient as needed; Maintain normal day/night, sleep wake cycles; Minimize sedating/altering medications as able    Adjusted Lactulose to 10 mg BID (2/9) for significant diarrhea needing rectal tube-- discontinued rectal tube (2/12) once stool more formed; rechecked ammonia levels on 2/14 normal at 29    Zyprexa 5 mg at bedtime scheduled.  Melatonin 3 mg p.o. at bedtime scheduled    Olanzapine BID PRN     Mittens/sitter as needed; minimize use of restraints as able     Bilateral pleural effusions   Acute now chronic respiratory failure requiring tracheostomy  - resolved    Pneumonia  - resolved   ARDS  - resolved    Right pneumothorax - resolved   *Initial event was parainfluenza and strep pneumo  "pneumonia back in November 2022. Treated for ARDS. Had failed extubation x2 and tracheostomy performed on previous hospitalization. *Had additional complications of bilateral pneumothoraces as well as hydropneumothorax- pleural fluid grew candida parapsilosis  *Completed 4-week antifungal treatment. While in LTACH he was successfully weaned from the ventilator.  *Recently there were concern for worsening effusion while at [facility] and had thoracentesis 01/13 which returned exudative without growth on cultures. CT chest/abdomen/pelvis on 01/18 demonstrated worsening effusions and concerns for infected parapneumonic effusions with possible SBP.  Multiple pulmonologist at [facility] reviewed CT scan recommended transfer to [facility] for consideration of chest tube placement and possible intrapleural lysis  *Thoracic surgery (Dr. Jackson) consulted during admission   *CT chest 1/22, small effusions on imaging and so chest tube was not inserted.   *Patient pulled out his tracheostomy tube on the night 2/1-2/2 and is tolerating well without increased shortness of breath persistent cough or worsening hypoxia    Monitor respiratory status, recently stable on room air    Wound care consult for tracheostomy site care, monitor for signs and symptoms of infection     Goals of care   As per prior rounding provider, \"on 1/25 nursing had mentioned that patient had refused to undergo dialysis and want to stop care, palliative care was consulted.  Patient and his spouse denied wanting to stop care and wanted ongoing restorative care including full code\"    Monitor, Full Code status     End-stage renal disease (ESRD), on hemodialysis (HD)  Now on HD.  No return of renal function to date.  Nephrology consulted.    Nephrology consult follow-up, appreciate help; M-W-F dialysis schedule    Dialysis labs as per nephrology     S/p liver transplant 2016   Chronic immunosuppression, on tacrolimus  Cirrhosis with ascites  Subacute bacterial " peritonitis  *Main manifestations of this are hepatic encephalopathy and ascites.  Hepatologist at [place] felt that most likely reason for the cirrhosis was metabolic syndrome or alcohol intake.  There was no evidence of rejection on biopsy and there was some evidence of regeneration. Paracentesis done on 12/22/2022 showed lactobacillus indicating SBP, treated with Unasyn and Augmentin, finished 2-week course 1/12/2023.  Requires large-volume paracentesis periodically for recurring ascites.    *Paracentesis 1/13/2023 yielded about 7500 cc. Cultures NGTD.  Most recent paracentesis on 1/24 with 4.8 L fluid removal    Continue intermittent paracentesis as needed if develops symptomatic ascites    Monitor for signs and symptoms of recurrent spontaneous bacterial peritonitis     Further treatment for recurrent ascites with TIPS deferred to his Liver Transplant team and/or Hepatology, he has an appointment on 4/21/2023 with Hepatology, Dr. Simms    Continue Tacrolimus 1 mg BID.    Continue rifaximin 550 mg BID    Continue lactulose as ordered, titrate as needed     Diarrhea, improved  - C difficile negative on 1/31. Secondary to lactulose.  - discontinued rectal tube (2/12) as stools more formed    Continue lactulose with goal of 2 to 3 soft bowel movement in 24 hours     Paroxysmal atrial fibrillation  Chronic anticoagulation, apixaban  Remains in sinus rhythm.  On anticoagulation with apixaban indefinitely for stroke prophylaxis.      Monitor rhythm, continue apixaban anticoagulation     Monitor hemoglobin, see below     Acute anemia  Pt has required intermittent transfusions for on-going anemia.  Anemia most likely secondary to critical illness/chronic disease, chronic renal disease.  Baseline hemoglobin around 7-8    Transfuse packed red blood cells to keep hemoglobin > 7    Hemoglobin 7.7 on 2/13, nephrology to also review     History PEA arrest   PEA arrest prior to his previous admission and 1 episode of PEA  associated with desaturation on 12/20.  No structural cardiac disease.     Stable, continue Cardiac Monitoring     Chronic Hypotension  In the past has developed baseline hypotension with cirrhosis and prolonged critical illness.  On hemodialysis.    Continue midodrine, nephrology to review      H/o Seizure after hypoxic event  This is in the context of baseline multifactorial encephalopathy secondary to his ongoing critical illness and prior cardiac arrests and prior strokes and cirrhosis with hepatic encephalopathy. MRI of head of 12/20/22 reveals no PRES or leptomeningeal enhancement but scattered.  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     Continue levetiracetam, lacosamide     Seizure precautions    Outpatient follow-up with neurology      Diabetes mellitus type 2  *Hemoglobin A1c on November 2022 was 4.7  *By report, on Lantus insulin in the past.  Blood sugar checks and sliding scale insulin previously discontinued as has been stable without insulin needs    Monitor with periodic blood sugar checks as needed     Severe malnutrition, protein and calorie type  Moderate dysphagia.    Continue with tube feeds via PEG tube    IR replaced the PEG tube on 2/8/2023, monitor    Nutritionist consulted and following for tube feeds    SLP following as well    Oral diet as per speech and language therapy (SLP)      Anxiety  Fluoxetine was discontinued as per psychiatry recommendation on 2/2 (see consult note for details)     Stable, monitor; comfort and reassurance offered during visit    Disposition     Estimated length of stay several days    Anticipated discharge to transitional care unit (TCU), long-term care, or LTACH; social work consulted, help appreciated    Admitted to hospital inpatient; new problem this admission, high risk medical condition, potentially severe and life-threatening; multiple medical problems with potential for worsening; high complexity  patient with additional workup and treatment planned (see hospital orders).       Diet: Room Service  Adult Formula Bolus Feeding: Novasource Renal; Route: Gastrostomy; 3 times daily; Volume per Bolus: 240; mL(s); 80 mL/hr x 3 hrs back up bolus feeding after each meal ONLY IF INTAKE <50% of meal  Snacks/Supplements Adult: Other; 4 oz Ensure with breakfast, Gelatein+ with lunch and magic cup with dinner (RD); With Meals  Soft & Bite Sized Diet (level 6) Liquidized/Moderately Thick (level 3) (By tsp only); No Straws    DVT Prophylaxis: DOAC  Nixon Catheter: Not present  Lines: PRESENT      CVC Double Lumen Right External jugular Tunneled-Site Assessment: WDL      Cardiac Monitoring: None  Code Status: Full Code      Clinically Significant Risk Factors              # Hypoalbuminemia: Lowest albumin = 1.9 g/dL at 1/23/2023  6:38 AM, will monitor as appropriate            # Severe Malnutrition: based on nutrition assessment        Disposition Plan     Expected Discharge Date: 02/20/2023,  3:00 PM    Destination: inpatient rehabilitation facility  Discharge Comments: 2/8 sitter  2/9 GI consult  2/10 dilaysis, rectal tube, TF          Renny Erwin MD  Hospitalist Service  [facility]  Securely message with [employer] (more info)  Text page via [employer] Paging/Directory   ______________________________________________________________________    Interval History   Comfortable, lying in bed, in dialysis unit without new complaints; no report of pain; first visit with patient today; patient discussed with nephrologist in dialysis unit today    Physical Exam   Vital Signs: Temp: 98  F (36.7  C) Temp src: Axillary BP: 112/74 Pulse: 95   Resp: 22 SpO2: 100 % O2 Device: None (Room air)    Weight: 189 lbs 9.53 oz    GENERAL awake and alert, lying in bed, on dialysis  HEENT no conjunctival injection or jaundice  LUNGS moderate inspiratory effort, no wheezes or crackles  HEART S1, S2 regular rate and rhythm;  no rubs or gallops  ABDOMEN soft, nontender to palpation; no guarding, rebound, or rigidity  MUSCULOSKELETAL extremities with mild pedal edema  SKIN warm and dry  NEURO moves upper and lower extremities spontaneously and to command  MENTAL STATUS answering questions and following simple commands    Medical Decision Making             Data         Imaging results reviewed over the past 24 hrs:   No results found for this or any previous visit (from the past 24 hour(s)).

## 2023-02-15 NOTE — PLAN OF CARE
DATE & TIME: 2/14/23, 1900 - 5330    Cognitive Concerns/ Orientation : A&O x 2, disoriented to time and situation. Mentation fluctuates. Whispers at times with illogical speech  BEHAVIOR & AGGRESSION TOOL COLOR: Green this shift. Can be yellow at times with cares  ABNL VS/O2: VSS on room air  MOBILITY: Up with lift. Patient continues to turn self frequently and independently in bed. Removing pillows placed to provide support when turned on side by staff   PAIN MANAGMENT: Denied  DIET: Soft, bite sized with moderately thick liquids. Total feed. Bolus tube feeds TID if oral intake is less than 50%  BOWEL/BLADDER: Incontinent of B/B. Had large soft BM x 1. Very little urine output  ABNL LAB/BG: NA  DRAIN/DEVICES: PIV SL, Right external jugular tunneled CVS heparin locked   TELEMETRY RHYTHM: NA  SKIN: Scattered bruises/scabs. Blanchable redness  to sacrum. Blanchable redness to elbows. Meplex in place to bilateral elbows. Trach site open to air.   TEST/PROCEDURES:D/C DATE: For dialysis today  D/C DATE: Pending progress.  OTHER IMPORTANT INFO : Patient's mood labile. Intermittently restless and trying to swing legs out of bed. Also uncooperative during cares at times. Additional prn dose of Melatonin was given. Slept intermittently.  PT will see patient on non dialysis days. Nephrology and WOC following. Attendant at bedside

## 2023-02-16 ENCOUNTER — APPOINTMENT (OUTPATIENT)
Dept: SPEECH THERAPY | Facility: CLINIC | Age: 58
DRG: 981 | End: 2023-02-16
Attending: STUDENT IN AN ORGANIZED HEALTH CARE EDUCATION/TRAINING PROGRAM
Payer: COMMERCIAL

## 2023-02-16 ENCOUNTER — APPOINTMENT (OUTPATIENT)
Dept: PHYSICAL THERAPY | Facility: CLINIC | Age: 58
DRG: 981 | End: 2023-02-16
Attending: STUDENT IN AN ORGANIZED HEALTH CARE EDUCATION/TRAINING PROGRAM
Payer: COMMERCIAL

## 2023-02-16 LAB
ACID FAST STAIN (ARUP): NORMAL

## 2023-02-16 PROCEDURE — 250N000013 HC RX MED GY IP 250 OP 250 PS 637: Performed by: INTERNAL MEDICINE

## 2023-02-16 PROCEDURE — 120N000001 HC R&B MED SURG/OB

## 2023-02-16 PROCEDURE — 36415 COLL VENOUS BLD VENIPUNCTURE: CPT | Performed by: HOSPITALIST

## 2023-02-16 PROCEDURE — 250N000013 HC RX MED GY IP 250 OP 250 PS 637

## 2023-02-16 PROCEDURE — 250N000013 HC RX MED GY IP 250 OP 250 PS 637: Performed by: HOSPITALIST

## 2023-02-16 PROCEDURE — 250N000013 HC RX MED GY IP 250 OP 250 PS 637: Performed by: STUDENT IN AN ORGANIZED HEALTH CARE EDUCATION/TRAINING PROGRAM

## 2023-02-16 PROCEDURE — 82306 VITAMIN D 25 HYDROXY: CPT | Performed by: HOSPITALIST

## 2023-02-16 PROCEDURE — 99233 SBSQ HOSP IP/OBS HIGH 50: CPT | Performed by: HOSPITALIST

## 2023-02-16 PROCEDURE — 250N000012 HC RX MED GY IP 250 OP 636 PS 637: Performed by: STUDENT IN AN ORGANIZED HEALTH CARE EDUCATION/TRAINING PROGRAM

## 2023-02-16 PROCEDURE — 99232 SBSQ HOSP IP/OBS MODERATE 35: CPT | Performed by: INTERNAL MEDICINE

## 2023-02-16 PROCEDURE — 92526 ORAL FUNCTION THERAPY: CPT | Mod: GN | Performed by: SPEECH-LANGUAGE PATHOLOGIST

## 2023-02-16 PROCEDURE — 250N000009 HC RX 250: Performed by: STUDENT IN AN ORGANIZED HEALTH CARE EDUCATION/TRAINING PROGRAM

## 2023-02-16 PROCEDURE — 97530 THERAPEUTIC ACTIVITIES: CPT | Mod: GP

## 2023-02-16 RX ADMIN — NYSTATIN 500000 UNITS: 100000 SUSPENSION ORAL at 17:38

## 2023-02-16 RX ADMIN — MIDODRINE HYDROCHLORIDE 10 MG: 5 TABLET ORAL at 13:21

## 2023-02-16 RX ADMIN — MELATONIN TAB 3 MG 3 MG: 3 TAB at 21:59

## 2023-02-16 RX ADMIN — Medication 40 MG: at 16:20

## 2023-02-16 RX ADMIN — OLANZAPINE 5 MG: 5 TABLET, ORALLY DISINTEGRATING ORAL at 21:58

## 2023-02-16 RX ADMIN — Medication 40 MG: at 09:08

## 2023-02-16 RX ADMIN — LACTULOSE 10 G: 10 SOLUTION ORAL at 08:50

## 2023-02-16 RX ADMIN — TACROLIMUS 1 MG: 5 CAPSULE ORAL at 09:08

## 2023-02-16 RX ADMIN — NYSTATIN 500000 UNITS: 100000 SUSPENSION ORAL at 21:58

## 2023-02-16 RX ADMIN — APIXABAN 5 MG: 5 TABLET, FILM COATED ORAL at 21:59

## 2023-02-16 RX ADMIN — NYSTATIN 500000 UNITS: 100000 SUSPENSION ORAL at 08:50

## 2023-02-16 RX ADMIN — APIXABAN 5 MG: 5 TABLET, FILM COATED ORAL at 08:50

## 2023-02-16 RX ADMIN — RIFAXIMIN 550 MG: 550 TABLET ORAL at 21:58

## 2023-02-16 RX ADMIN — WHITE PETROLATUM: 1.75 OINTMENT TOPICAL at 08:53

## 2023-02-16 RX ADMIN — ACETAMINOPHEN 650 MG: 325 TABLET, FILM COATED ORAL at 16:20

## 2023-02-16 RX ADMIN — LEVETIRACETAM 1000 MG: 100 SOLUTION ORAL at 21:59

## 2023-02-16 RX ADMIN — OLANZAPINE 5 MG: 5 TABLET, ORALLY DISINTEGRATING ORAL at 03:39

## 2023-02-16 RX ADMIN — MIDODRINE HYDROCHLORIDE 10 MG: 5 TABLET ORAL at 17:38

## 2023-02-16 RX ADMIN — TACROLIMUS 1 MG: 5 CAPSULE ORAL at 00:06

## 2023-02-16 RX ADMIN — RIFAXIMIN 550 MG: 550 TABLET ORAL at 08:50

## 2023-02-16 RX ADMIN — MIDODRINE HYDROCHLORIDE 10 MG: 5 TABLET ORAL at 08:50

## 2023-02-16 RX ADMIN — NYSTATIN 500000 UNITS: 100000 SUSPENSION ORAL at 13:22

## 2023-02-16 RX ADMIN — FOLIC ACID 1 MG: 1 TABLET ORAL at 08:50

## 2023-02-16 RX ADMIN — LACOSAMIDE 100 MG: 10 SOLUTION ORAL at 00:06

## 2023-02-16 RX ADMIN — LACOSAMIDE 100 MG: 10 SOLUTION ORAL at 08:51

## 2023-02-16 ASSESSMENT — ACTIVITIES OF DAILY LIVING (ADL)
ADLS_ACUITY_SCORE: 61
ADLS_ACUITY_SCORE: 59
ADLS_ACUITY_SCORE: 59
ADLS_ACUITY_SCORE: 61
ADLS_ACUITY_SCORE: 59
ADLS_ACUITY_SCORE: 61

## 2023-02-16 NOTE — PLAN OF CARE
Goal Outcome Evaluation:           Overall Patient Progress: no changeOverall Patient Progress: no change    Outcome Evaluation: requiring 1-2 boluses most days, is tolerating some food. no documented refusal of TF the past several days. Continue current nutrition interventions.

## 2023-02-16 NOTE — PLAN OF CARE
Goal Outcome Evaluation:                      Patient A&0x2. Up with Lift. Turns well side to side in bed.  On hemodialysis M,W,F. Using bedpan for stool. He has bruises and scabs scattered through out his body.  Peg Tube in place. Abdominal binder in place.   Trace edema in feet and legs noted. Fine crackles noted in bases of lungs. Ate more than 75% at breakfast. No TF bolus given. Awaiting to see how he eats for lunch. Family in room to encourage PO intake. On moist and bite sized and moderately thick liquid diet. Needs lots of encouragement for good PO intake.  No c/o pain. No non verbal signs of pain noted.  Full code. Sitter at bedside.

## 2023-02-16 NOTE — PLAN OF CARE
DATE & TIME: 2/15/23 [id number]    Cognitive Concerns/ Orientation : Pt A/Ox1, oriented to self. Pt thought he was at a store then later in the shift needed to get in line at the Emergency room. Attempted to reorient patient but he does not believe us.   BEHAVIOR & AGGRESSION TOOL COLOR: Green   ABNL VS/O2: VSS on RA  MOBILITY: Lift, fall risk. Reposition at times. Pt does move around frequently in bed.  PAIN MANAGMENT: Denies  DIET: Soft, bite sized with moderately thick liquids. Total feed. Bolus tube feeds TID if oral intake less than 50%.  BOWEL/BLADDER: Incontinent of bowel and bladder. Dialysis patient, little output. BM x2 this shift. One on evenings and one overnight.  DRAIN/DEVICES: IV SL. Right external jugular tunneled CVC. PEG tube with abdominal binder.  SKIN: Scattered scabs and bruises. Blanchable redness to coccyx and elbows. Mepilex on bilateral elbows.  D/C DATE: Discharge pending progress  OTHER IMPORTANT INFO: Intermittently restless and attempting to get out of bed. Pt restless overnight and was given PRN Zyprexa x1.

## 2023-02-16 NOTE — PROGRESS NOTES
CLINICAL NUTRITION SERVICES - REASSESSMENT NOTE    Recommendations Ordered by Registered Dietitian (RD):   - Supplements as ordered & room service assistance   - TF as ordered / as needed   Malnutrition: (1/22)  % Weight Loss:  > 5% in 1 month (severe malnutrition)  % Intake:  </= 50% for >/= 5 days (severe malnutrition)- suspected   Subcutaneous Fat Loss:  Orbital region moderate depletion, Upper arm region moderate-severe depletion and Thoracic region moderate-severe depletion  Muscle Loss:  Temporal region moderate depletion, Clavicle bone region severe depletion, Acromion bone region severe depletion, Patellar region moderate depletion and Anterior thigh region moderate-severe depletion  Fluid Retention:  Trace     Malnutrition Diagnosis: Severe malnutrition  In Context of:  Acute on Chronic illness or disease     EVALUATION OF PROGRESS TOWARD GOALS   Diet: Soft & Bite Sized  Liquidized/moderately thick liquids    Supplements:   4 oz Ensure w/ Breakfast  Gelatein w/ Lunch  Magic Cup w/ dinner    Nutrition Support:   Type of Feeding Tube: G-tube  Enteral Frequency:  Continuous  Enteral Regimen: Novasource Renal at 80 mL/hr x 3 hours TID --> IF consumes < 50% of meal  Per Bolus Provisions: 240 mL formula = 480 kcal, 22 g protein, 44 g CHO, 0 g fiber and 172 mL free water  Free Water Flush: 100 mL before and after each feeding    Intake/Tolerance:   - Oral: No meals documented yesterday. Per RN documentation, at least two meals fell short of 50% goal. Pt did eat 100, 75, and 50% of his three meals on 2/14.   - Enteral:   2/15 - received 2 bolus feeds (480 mL, 960 kcal, 44 g protein)  2/14 - no bolus feeds documented  2/13 - receive 1 bolus feed (240 mL, 480 kcal, 22 g protein)    - Labs: reviewed. Last drawn 2/13  - Stooling:   BM x1 this morning  2/15: x3  2/14: x3  2/13: x6-8  - Weight:   86 kg on 2/11. Stable from 1/26.   - Meds:  Folic acid 1 mg daily, lactulose BID    ASSESSED NUTRITION NEEDS:  Dosing  Weight 82.4 kg   Estimated Energy Needs: 9876-6369 kcals (30-35 Kcal/Kg)  Justification: repletion and HD  Estimated Protein Needs: + grams protein (1.2-1.5+ g pro/Kg)  Justification: Repletion and dialysis    NEW FINDINGS:   2/14: WOCN   - Coccyx PI: healed   - Midline Low back: purpura all over body and appears skin was torn open: healing   - Trach site: healed    Previous Goals:   TF + PO to provide % estimated needs.   Evaluation: Suspect Met, unable to fully assess w/o calorie count    Previous Nutrition Diagnosis:   Inadequate oral intake related to reliance on TF for make-up volumes with intermittent tf refusal as evidenced by patient has been receiving 1-2 bolus feeds daily, with intakes between 25-75% of most meals the past 4 days.   Evaluation: No change    CURRENT NUTRITION DIAGNOSIS  Inadequate oral intake related to reliance on TF for make-up volumes with as evidenced by patient has been receiving 1-2 bolus feeds daily, with intakes between 25-75% of most meals the past week.     INTERVENTIONS  Recommendations / Nutrition Prescription  Continue current nutrition intervention.     Implementation  None new    Goals  Consumption of at least 50% of meal vs replace with bolus feeds.     MONITORING AND EVALUATION:  Progress towards goals will be monitored and evaluated per protocol and Practice Guidelines    Edna Osuna RD, LD  [facility], Ortho, [facility]o Sp[facility]  Pager: [phone]  Weekend Pager: [phone]

## 2023-02-16 NOTE — PROGRESS NOTES
"SPIRITUAL HEALTH SERVICES Progress Note    [facility]    Saw pt Blanco Osborne per follow-up plan of care. Family were also present at the bedside.       Patient/Family Understanding of Illness and Goals of Care - During this encounter, Blanco was seated in his bedside chair and able to engage in conversation. I have followed Blanco on previous hospital admissions. Today, I noted that this was the first time when we were able to converse together, which Blanco views positively. We briefly reflected on the lengthy journey that the patient has been on here at [facility] and on his previous trip to [facility].       Distress and Loss - The pt's extended hospitalization continues to be the primary areas of distress for this pt/family.       Strengths, Coping, and Resources - Family are regularly present and supportive at the bedside throughout the day. Blanco affirmed that this was helpful as there is otherwise \"not much to do.\" Family also noted today that they have many people praying for Blanco and noted that they would appreciate a prayer at the bedside today. I offered a prayer that incorporated themes form our conversation at the end of this visit.        Meaning, Beliefs, and Spirituality - Blanco is Zoroastrianism and inquired about whether communion might be possible while in the hospital. I noted that I would work with colleagues to potentially schedule a time of communion tomorrow.        Plan of Care - SH support is greatly appreciated by the pt/family and I will continue to follow while on unit 66. Please consult if any additional needs arise.     ------  Vitor Palacios M.Div.  Resident   Pager: [phone]  "

## 2023-02-16 NOTE — PROGRESS NOTES
[facility]    Medicine Progress Note - Hospitalist Service    Date of Admission:  1/21/2023    Assessment & Plan   Blanco Osborne is a 57 year-old male admitted on 1/21/2023 as a transfer from [facility] for evaluation of loculated pleural effusion. He has extensive PMH including h/o liver transplant 2016 due to alcohol abuse, pAF on chronic anticoagulation, history of diabetes mellitus type 2, CVA, hypertension, CHRISTIAN on BiPAP.  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  He was recovering in [facility] and was transferred to [facility] for consideration of chest tube placement and possible intrapleural lysis for worsening effusion.    Acute metabolic encephalopathy with delirium, multifactorial  Hepatic encephalopathy  Chronic liver disease, history of liver transplant 2016  End-stage renal disease (ESRD), on hemodialysis (HD)  History of seizure, on Keppra and Vimpat  Waxing and waning mentation, coinciding with lactulose being held at [facility]  Earlier in hospital stay, remained confused and had pulled out tracheostomy tube, PICC line and pulled his dialysis catheter.  Lines replaced.  Palliative care consult 1/26, see note.  * Psychiatry consult 2/2, see note    Continues with fluctuating mentation, monitor    Re-orient as needed; Maintain normal day/night, sleep wake cycles; Minimize sedating/altering medications as able    Adjusted Lactulose to 10 mg BID (2/9) for significant diarrhea needing rectal tube-- discontinued rectal tube (2/12) once stool more formed; rechecked ammonia levels on 2/14 normal at 29    Zyprexa 5 mg at bedtime scheduled.  Melatonin 3 mg p.o. at bedtime scheduled    Olanzapine BID PRN     Mittens/sitter as needed; minimize use of restraints as able    Ongoing  hemodialysis, as per nephrology, on M-W-F dialysis schedule; ongoing nephrology consult follow-up appreciated    Dialysis labs as per nephrology    Vitamin D level ordered per family request    Psychiatry consult follow-up requested, last visit 2/2     Bilateral pleural effusions   Acute respiratory failure requiring tracheostomy  - resolved    Pneumonia  - resolved   ARDS  - resolved    Right pneumothorax - resolved   *Initial event was parainfluenza and strep pneumo pneumonia back in November 2022. Treated for ARDS. Had failed extubation x2 and tracheostomy performed on previous hospitalization. *Had additional complications of bilateral pneumothoraces as well as hydropneumothorax- pleural fluid grew candida parapsilosis  *[facility]  *Recently there were concern for worsening effusion while at [facility] and had thoracentesis 01/13 which returned exudative without growth on cultures. CT chest/abdomen/pelvis on 01/18 demonstrated worsening effusions and concerns for infected parapneumonic effusions with possible SBP.  Multiple pulmonologist at [facility] reviewed CT scan recommended transfer to [facility] for consideration of chest tube placement and possible intrapleural lysis  *Thoracic surgery (Dr. Jackson) consulted during admission   *CT chest 1/22, small effusions on imaging and so chest tube was not inserted.   ID consulted, see note 2/10.  *Patient pulled out his tracheostomy tube on the night 2/1-2/2 and is tolerating well without increased shortness of breath persistent cough or worsening hypoxia  * Chest x-ray 2/3 with cardiomegaly, chronic small bilateral pleural effusions, no pulmonary edema; right internal jugular dialysis catheter in place    Monitor respiratory status, recently stable on room air    Encourage incentive spirometry as able    Wound care consult for tracheostomy site care, monitor for signs and symptoms of  "infection     S/p liver transplant 2016   Chronic immunosuppression, on tacrolimus  Cirrhosis with ascites  Subacute bacterial peritonitis (SBP), resolved  *Main manifestations of this are hepatic encephalopathy and ascites.  Hepatologist at [facility] felt that most likely reason for the cirrhosis was metabolic syndrome or alcohol intake.  There was no evidence of rejection on biopsy and there was some evidence of regeneration. Paracentesis done on 12/22/2022 showed lactobacillus indicating SBP, treated with Unasyn and Augmentin, finished 2-week course 1/12/2023.  Requires large-volume paracentesis periodically for recurring ascites.    *Paracentesis 1/13/2023 yielded about 7500 cc. Cultures NGTD.  Most recent paracentesis on 1/24 with 4.8 L fluid removal    Continue intermittent paracentesis as needed if develops symptomatic ascites    Monitor for signs and symptoms of recurrent spontaneous bacterial peritonitis     Further treatment for recurrent ascites with TIPS deferred to his Liver Transplant team and/or Hepatology, he has an appointment on 4/21/2023 with Hepatology, Dr. Simms    Continue Tacrolimus 1 mg BID.    Continue rifaximin 550 mg BID    Continue lactulose as ordered, titrate as needed    GI consult as needed in hospital for new acute issues, otherwise as outpatient    Goals of care   As per prior rounding provider, \"on 1/25 nursing had mentioned that patient had refused to undergo dialysis and want to stop care, palliative care was consulted.  Patient and his spouse denied wanting to stop care and wanted ongoing restorative care including full code\"    Monitor, Full Code status     Diarrhea, improved, on lactulose for chronic liver disease  - C difficile negative on 1/31. Secondary to lactulose.  - discontinued rectal tube (2/12) as stools more formed    Continue lactulose with goal of 2 to 3 soft bowel movement in 24 hours     Paroxysmal atrial fibrillation  Chronic anticoagulation, apixaban  Remains " in sinus rhythm.  On anticoagulation with apixaban indefinitely for stroke prophylaxis.      Monitor rhythm, continue apixaban anticoagulation     Monitor hemoglobin, see below     Acute anemia  Pt has required intermittent transfusions for on-going anemia.  Anemia most likely secondary to critical illness/chronic disease, chronic renal disease.  Baseline hemoglobin around 7-8    Transfuse packed red blood cells to keep hemoglobin > 7    Hemoglobin 7.7 on 2/13, nephrology to also review     History PEA arrest   PEA arrest prior to his previous admission and 1 episode of PEA associated with desaturation on 12/20.  No structural cardiac disease.     Stable, continue Cardiac Monitoring     Chronic Hypotension  In the past has developed baseline hypotension with cirrhosis and prolonged critical illness.  On hemodialysis.    Continue midodrine, nephrology to review      H/o Seizure   After hypoxic event, in the context of baseline multifactorial encephalopathy secondary to his ongoing critical illness and prior cardiac arrests and prior strokes and cirrhosis with hepatic encephalopathy. MRI of head of 12/20/22 reveals no PRES or leptomeningeal enhancement but scattered.  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     Continue levetiracetam, lacosamide     Seizure precautions    Outpatient follow-up with neurology, consult inpatient if needed      Diabetes mellitus type 2  *Hemoglobin A1c on November 2022 was 4.7  *By report, on Lantus insulin in the past.  Blood sugar checks and sliding scale insulin previously discontinued as has been stable without insulin needs    Monitor with periodic blood sugar checks as needed     Severe malnutrition, protein and calorie type  Moderate dysphagia  G-tube feedings    Continue with tube feeds via PEG tube    IR replaced the PEG tube on 2/8/2023, monitor    Nutritionist consulted and following for tube feeds    SLP following as  well    Oral diet as per speech and language therapy (SLP)      Anxiety  Fluoxetine was discontinued as per psychiatry recommendation on 2/2 (see consult note for details)     Stable, monitor; comfort and reassurance offered during visit    Disposition     Estimated length of stay several days    Anticipated discharge to transitional care unit (TCU), long-term care, or LTACH; social work consulted, help appreciated; [facility] and by report not able to return there (note 2/10)    Patient's wife, Ofe Osborne, called for update and no answer on 2/16    Total time spent caring for patient today thus far 50 minutes.  Total time spent reviewing medical records, interviewing and examining patient, ordering and reviewing test results, reviewing and ordering subspecialty consults (nephrology, psychiatry), communicating with care providers, implementing clinical decision making and coordinating care, documenting in chart, and attempting to update family (his wife).       Diet: Room Service  Adult Formula Bolus Feeding: Novasource Renal; Route: Gastrostomy; 3 times daily; Volume per Bolus: 240; mL(s); 80 mL/hr x 3 hrs back up bolus feeding after each meal ONLY IF INTAKE <50% of meal  Snacks/Supplements Adult: Other; 4 oz Ensure with breakfast, Gelatein+ with lunch and magic cup with dinner (RD); With Meals  Soft & Bite Sized Diet (level 6) Liquidized/Moderately Thick (level 3) (By tsp only); No Straws    DVT Prophylaxis: DOAC  Nixon Catheter: Not present  Lines: PRESENT      CVC Double Lumen Right External jugular Tunneled-Site Assessment: WDL      Cardiac Monitoring: None  Code Status: Full Code      Clinically Significant Risk Factors              # Hypoalbuminemia: Lowest albumin = 1.9 g/dL at 1/23/2023  6:38 AM, will monitor as appropriate            # Severe Malnutrition: based on nutrition assessment        Disposition Plan      Expected Discharge Date: 02/21/2023,  3:00 PM    Destination: inpatient  rehabilitation facility  Discharge Comments: admitted on 1-21 from [facility], Dialysis pt MWF. Sitter for restlessness. Will need placement          Renny Erwin MD  Hospitalist Service  [facility]  Securely message with [employer] (more info)  Text page via [facility] Paging/Directory   ______________________________________________________________________    Interval History   Lying in bed, restless at times, one-to-one at the bedside; no report of pain; on/off confusion persists with encephalopathy; reportedly disoriented to time and situation and mentation fluctuates    Physical Exam   Vital Signs: Temp: 97.4  F (36.3  C) Temp src: Oral BP: 90/52 Pulse: 105   Resp: 18 SpO2: 94 % O2 Device: None (Room air)    Weight: 189 lbs 9.53 oz    GENERAL awake and alert, lying in bed, appears comfortable  HEENT no conjunctival injection or jaundice  LUNGS mild to moderate inspiratory effort, no wheezes or crackles  HEART S1, S2 regular rate and rhythm; no rubs or gallops  ABDOMEN soft, nontender to palpation; no guarding, rebound, or rigidity; abdominal binder in place**  MUSCULOSKELETAL extremities with mild pedal edema  SKIN warm and dry; dialysis cathter right upper chest intact  NEURO moves upper and lower extremities spontaneously and to command  MENTAL STATUS answering questions and following simple commands    Medical Decision Making             Data         Imaging results reviewed over the past 24 hrs:   No results found for this or any previous visit (from the past 24 hour(s)).

## 2023-02-16 NOTE — PROGRESS NOTES
Renal Medicine Progress Note            Assessment/Plan:     Following ARF without recovery/current dialysis dependence         1) End Stage Kidney Disease (CKD and subsequent ATN with non-recovery)  Initially LORETTA, but no renal recovery (anuric/oliguric), now considered ESRD. 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 On midodrine 10mg TID.     2) Anemia,  persistent anemia despite adequate iron stores and high epo dosing suggesting some epo resistance.      3) disposition:   not returning to LTACH. In case not able to go to inpatient rehab, would consider [facility] for TCU/rehab and dialysis.     4) Diffuse pruritis  No hyperphosphatemia, though pruritis might be related to his renal disease. Trial urea cream     Other:  Hx liver transplant 2016  Confusion/encephlopathy  DM  AMS, delirium        Plan/Recs:  1) HD tomorrow per chronic MWF schedule   2) Agree that In current state not outpatient dialysis candidate     Torsten Montaño DO  [facility] consultants  Office: [phone]  Cell: [phone]        Interval History:      Pt in no distress, confused. S/p HD 2/15, ran 3 hrs with 1.5kg UF done via right CVC.            Medications and Allergies:       - MEDICATION INSTRUCTIONS for Dialysis Patients -   Does not apply See Admin Instructions     apixaban ANTICOAGULANT  5 mg Oral BID     folic acid  1 mg Oral or Feeding Tube Daily     lacosamide  100 mg Oral or Feeding Tube BID     lactulose  10 g Oral BID     levETIRAcetam  1,000 mg Oral or Feeding Tube Q24H     lidocaine  1 patch Transdermal Q24H     lidocaine   Transdermal Q8H BIJU     melatonin  3 mg Oral At Bedtime     midodrine  10 mg Oral or Feeding Tube TID w/meals     mineral oil-hydrophilic petrolatum   Topical Daily     nystatin  500,000 Units Swish & Spit 4x Daily     OLANZapine zydis  5 mg Oral At Bedtime     pantoprazole  40 mg Oral or Feeding Tube BID AC      rifaximin  550 mg Oral or Feeding Tube BID     sodium chloride (PF)  10-30 mL Intracatheter Q8H     sodium chloride (PF)  3 mL Intracatheter Q8H     tacrolimus  1 mg Oral BID IS      No Known Allergies         Physical Exam:   Vitals were reviewed  BP 96/54 (BP Location: Right arm, Patient Position: Supine, Cuff Size: Adult Regular)   Pulse 106   Temp 98.1  F (36.7  C) (Oral)   Resp 18   Wt 86 kg (189 lb 9.5 oz)   SpO2 93%   BMI 25.71 kg/m      Wt Readings from Last 3 Encounters:   02/11/23 86 kg (189 lb 9.5 oz)   01/21/23 82.4 kg (181 lb 11.2 oz)   12/28/22 96 kg (211 lb 10.3 oz)       Intake/Output Summary (Last 24 hours) at 2/16/2023 1036  Last data filed at 2/16/2023 0900  Gross per 24 hour   Intake 380 ml   Output 1500 ml   Net -1120 ml       GENERAL: not interacting  HEENT: NC/AT  RESP: clear  CV: RRR, normal S1 S2  MS: no edema  SKIN: catheter site clean without drainage           Data:     BMP  Recent Labs   Lab 02/13/23  0850      POTASSIUM 4.7   CHLORIDE 101   KELLY 9.7   CO2 34*   BUN 60.2*   CR 4.07*   GLC 93     CBC  Recent Labs   Lab 02/13/23  0850   WBC 5.7   HGB 7.7*   HCT 26.7*   *   *     Lab Results   Component Value Date    AST 19 01/28/2023    ALT 5 (L) 01/28/2023    ALKPHOS 235 (H) 01/28/2023    BILITOTAL 0.6 01/28/2023    CHANDLER 29 02/14/2023     Lab Results   Component Value Date    INR 1.36 (H) 12/21/2022       Attestation:  I have reviewed today's vital signs, notes, medications, labs and imaging.    DO [facility] - Nephrology  Office: [phone]  Cell: [phone]

## 2023-02-16 NOTE — PLAN OF CARE
Goal Outcome Evaluation:      Plan of Care Reviewed With: patient    SUMMARY: Bilateral pleural effusions, encephalopathy     DATE & TIME: 2/15/23 5508-3040   Cognitive Concerns/ Orientation : A&O x 2, disoriented to time and situation. Mentation fluctuates. Whispers at times with illogical speech  BEHAVIOR & AGGRESSION TOOL COLOR: Green this shift. Can be yellow at times with cares  ABNL VS/O2: VSS on room air  MOBILITY: Up with lift. Patient continues to turn self frequently and independently in bed. Removing pillows placed to provide support when turned on side by staff   PAIN MANAGMENT: Denied  DIET: Soft, bite sized with moderately thick liquids. Total feed. Bolus tube feeds TID if oral intake is less than 50% received two bolus today  BOWEL/BLADDER: Incontinent of B/B. Had large soft BM x 1. Very little urine output  ABNL LAB/BG: NA  DRAIN/DEVICES: PIV SL, Right external jugular tunneled CVS heparin locked, Peg tube  TELEMETRY RHYTHM: NA  SKIN: Scattered bruises/scabs. Blanchable redness  to sacrum. Blanchable redness to elbows. Meplex in place to bilateral elbows. Trach site open to air.   TEST/PROCEDURES:D/C DATE: Had dialysis today, 1.5L removed  D/C DATE: Pending progress.  OTHER IMPORTANT INFO : Patient's mood labile. Intermittently restless and trying to swing legs out of bed. Also uncooperative during cares at times. Prn dose of Mzyprexa was given. PT will see patient on non dialysis days. Nephrology and WOC following. Attendant at bedside

## 2023-02-17 LAB
ALBUMIN SERPL BCG-MCNC: 2.3 G/DL (ref 3.5–5.2)
ALP SERPL-CCNC: 216 U/L (ref 40–129)
ALT SERPL W P-5'-P-CCNC: 9 U/L (ref 10–50)
ANION GAP SERPL CALCULATED.3IONS-SCNC: 10 MMOL/L (ref 7–15)
AST SERPL W P-5'-P-CCNC: 37 U/L (ref 10–50)
BASOPHILS # BLD AUTO: 0.1 10E3/UL (ref 0–0.2)
BASOPHILS NFR BLD AUTO: 1 %
BILIRUB DIRECT SERPL-MCNC: <0.2 MG/DL (ref 0–0.3)
BILIRUB SERPL-MCNC: 0.4 MG/DL
BUN SERPL-MCNC: 46.6 MG/DL (ref 6–20)
CALCIUM SERPL-MCNC: 9.3 MG/DL (ref 8.6–10)
CHLORIDE SERPL-SCNC: 94 MMOL/L (ref 98–107)
CREAT SERPL-MCNC: 3.92 MG/DL (ref 0.67–1.17)
DEPRECATED CALCIDIOL+CALCIFEROL SERPL-MC: 17 UG/L (ref 20–75)
DEPRECATED HCO3 PLAS-SCNC: 32 MMOL/L (ref 22–29)
EOSINOPHIL # BLD AUTO: 0.4 10E3/UL (ref 0–0.7)
EOSINOPHIL NFR BLD AUTO: 6 %
ERYTHROCYTE [DISTWIDTH] IN BLOOD BY AUTOMATED COUNT: 19.8 % (ref 10–15)
GFR SERPL CREATININE-BSD FRML MDRD: 17 ML/MIN/1.73M2
GLUCOSE SERPL-MCNC: 96 MG/DL (ref 70–99)
HCT VFR BLD AUTO: 28.5 % (ref 40–53)
HGB BLD-MCNC: 8.1 G/DL (ref 13.3–17.7)
IMM GRANULOCYTES # BLD: 0 10E3/UL
IMM GRANULOCYTES NFR BLD: 0 %
LYMPHOCYTES # BLD AUTO: 1.7 10E3/UL (ref 0.8–5.3)
LYMPHOCYTES NFR BLD AUTO: 27 %
MCH RBC QN AUTO: 29.9 PG (ref 26.5–33)
MCHC RBC AUTO-ENTMCNC: 28.4 G/DL (ref 31.5–36.5)
MCV RBC AUTO: 105 FL (ref 78–100)
MONOCYTES # BLD AUTO: 0.6 10E3/UL (ref 0–1.3)
MONOCYTES NFR BLD AUTO: 10 %
NEUTROPHILS # BLD AUTO: 3.4 10E3/UL (ref 1.6–8.3)
NEUTROPHILS NFR BLD AUTO: 56 %
NRBC # BLD AUTO: 0 10E3/UL
NRBC BLD AUTO-RTO: 0 /100
PLATELET # BLD AUTO: 104 10E3/UL (ref 150–450)
POTASSIUM SERPL-SCNC: 4.4 MMOL/L (ref 3.4–5.3)
PROT SERPL-MCNC: 6.5 G/DL (ref 6.4–8.3)
PTH-INTACT SERPL-MCNC: 9 PG/ML (ref 15–65)
RBC # BLD AUTO: 2.71 10E6/UL (ref 4.4–5.9)
SODIUM SERPL-SCNC: 136 MMOL/L (ref 136–145)
WBC # BLD AUTO: 6.1 10E3/UL (ref 4–11)

## 2023-02-17 PROCEDURE — 634N000001 HC RX 634: Performed by: INTERNAL MEDICINE

## 2023-02-17 PROCEDURE — 250N000013 HC RX MED GY IP 250 OP 250 PS 637: Performed by: INTERNAL MEDICINE

## 2023-02-17 PROCEDURE — 85025 COMPLETE CBC W/AUTO DIFF WBC: CPT | Performed by: HOSPITALIST

## 2023-02-17 PROCEDURE — 250N000013 HC RX MED GY IP 250 OP 250 PS 637: Performed by: HOSPITALIST

## 2023-02-17 PROCEDURE — 83970 ASSAY OF PARATHORMONE: CPT | Performed by: STUDENT IN AN ORGANIZED HEALTH CARE EDUCATION/TRAINING PROGRAM

## 2023-02-17 PROCEDURE — 250N000013 HC RX MED GY IP 250 OP 250 PS 637

## 2023-02-17 PROCEDURE — 99233 SBSQ HOSP IP/OBS HIGH 50: CPT | Performed by: HOSPITALIST

## 2023-02-17 PROCEDURE — 250N000013 HC RX MED GY IP 250 OP 250 PS 637: Performed by: STUDENT IN AN ORGANIZED HEALTH CARE EDUCATION/TRAINING PROGRAM

## 2023-02-17 PROCEDURE — 250N000009 HC RX 250: Performed by: STUDENT IN AN ORGANIZED HEALTH CARE EDUCATION/TRAINING PROGRAM

## 2023-02-17 PROCEDURE — 82306 VITAMIN D 25 HYDROXY: CPT | Performed by: STUDENT IN AN ORGANIZED HEALTH CARE EDUCATION/TRAINING PROGRAM

## 2023-02-17 PROCEDURE — 90935 HEMODIALYSIS ONE EVALUATION: CPT | Performed by: STUDENT IN AN ORGANIZED HEALTH CARE EDUCATION/TRAINING PROGRAM

## 2023-02-17 PROCEDURE — 36415 COLL VENOUS BLD VENIPUNCTURE: CPT | Performed by: HOSPITALIST

## 2023-02-17 PROCEDURE — 80053 COMPREHEN METABOLIC PANEL: CPT | Performed by: HOSPITALIST

## 2023-02-17 PROCEDURE — 82248 BILIRUBIN DIRECT: CPT | Performed by: HOSPITALIST

## 2023-02-17 PROCEDURE — 120N000001 HC R&B MED SURG/OB

## 2023-02-17 PROCEDURE — 250N000012 HC RX MED GY IP 250 OP 636 PS 637: Performed by: STUDENT IN AN ORGANIZED HEALTH CARE EDUCATION/TRAINING PROGRAM

## 2023-02-17 PROCEDURE — 90937 HEMODIALYSIS REPEATED EVAL: CPT

## 2023-02-17 RX ADMIN — MIDODRINE HYDROCHLORIDE 10 MG: 5 TABLET ORAL at 17:52

## 2023-02-17 RX ADMIN — Medication 2.5 MG: at 23:02

## 2023-02-17 RX ADMIN — LACOSAMIDE 100 MG: 10 SOLUTION ORAL at 00:54

## 2023-02-17 RX ADMIN — LACTULOSE 10 G: 10 SOLUTION ORAL at 08:06

## 2023-02-17 RX ADMIN — MIDODRINE HYDROCHLORIDE 10 MG: 5 TABLET ORAL at 08:06

## 2023-02-17 RX ADMIN — NYSTATIN 500000 UNITS: 100000 SUSPENSION ORAL at 13:07

## 2023-02-17 RX ADMIN — NYSTATIN 500000 UNITS: 100000 SUSPENSION ORAL at 08:06

## 2023-02-17 RX ADMIN — TACROLIMUS 1 MG: 5 CAPSULE ORAL at 13:07

## 2023-02-17 RX ADMIN — Medication 40 MG: at 16:31

## 2023-02-17 RX ADMIN — LACTULOSE 10 G: 10 SOLUTION ORAL at 21:09

## 2023-02-17 RX ADMIN — RIFAXIMIN 550 MG: 550 TABLET ORAL at 21:09

## 2023-02-17 RX ADMIN — TACROLIMUS 1 MG: 5 CAPSULE ORAL at 00:54

## 2023-02-17 RX ADMIN — LEVETIRACETAM 1000 MG: 100 SOLUTION ORAL at 21:11

## 2023-02-17 RX ADMIN — Medication 40 MG: at 08:16

## 2023-02-17 RX ADMIN — LIDOCAINE 1 PATCH: 560 PATCH PERCUTANEOUS; TOPICAL; TRANSDERMAL at 09:46

## 2023-02-17 RX ADMIN — EPOETIN ALFA-EPBX 20000 UNITS: 10000 INJECTION, SOLUTION INTRAVENOUS; SUBCUTANEOUS at 10:29

## 2023-02-17 RX ADMIN — MELATONIN TAB 3 MG 3 MG: 3 TAB at 21:09

## 2023-02-17 RX ADMIN — WHITE PETROLATUM: 1.75 OINTMENT TOPICAL at 08:15

## 2023-02-17 RX ADMIN — ACETAMINOPHEN 650 MG: 325 TABLET, FILM COATED ORAL at 08:06

## 2023-02-17 RX ADMIN — NYSTATIN 500000 UNITS: 100000 SUSPENSION ORAL at 21:10

## 2023-02-17 RX ADMIN — APIXABAN 5 MG: 5 TABLET, FILM COATED ORAL at 21:09

## 2023-02-17 RX ADMIN — OLANZAPINE 5 MG: 5 TABLET, ORALLY DISINTEGRATING ORAL at 21:09

## 2023-02-17 RX ADMIN — RIFAXIMIN 550 MG: 550 TABLET ORAL at 08:06

## 2023-02-17 RX ADMIN — MIDODRINE HYDROCHLORIDE 10 MG: 5 TABLET ORAL at 13:06

## 2023-02-17 RX ADMIN — FOLIC ACID 1 MG: 1 TABLET ORAL at 08:06

## 2023-02-17 RX ADMIN — LACOSAMIDE 100 MG: 10 SOLUTION ORAL at 14:14

## 2023-02-17 RX ADMIN — ACETAMINOPHEN 650 MG: 325 TABLET, FILM COATED ORAL at 17:52

## 2023-02-17 RX ADMIN — NYSTATIN 500000 UNITS: 100000 SUSPENSION ORAL at 17:52

## 2023-02-17 RX ADMIN — APIXABAN 5 MG: 5 TABLET, FILM COATED ORAL at 13:06

## 2023-02-17 ASSESSMENT — ACTIVITIES OF DAILY LIVING (ADL)
ADLS_ACUITY_SCORE: 61
ADLS_ACUITY_SCORE: 61
ADLS_ACUITY_SCORE: 67
ADLS_ACUITY_SCORE: 64
ADLS_ACUITY_SCORE: 63
ADLS_ACUITY_SCORE: 64
ADLS_ACUITY_SCORE: 61
ADLS_ACUITY_SCORE: 63
ADLS_ACUITY_SCORE: 64
ADLS_ACUITY_SCORE: 61

## 2023-02-17 NOTE — PROGRESS NOTES
[facility]    Medicine Progress Note - Hospitalist Service    Date of Admission:  1/21/2023    Assessment & Plan   Blanco Osborne is a 57 year-old male admitted on 1/21/2023 as a transfer from [facility] for evaluation of loculated pleural effusion. He has extensive PMH including h/o liver transplant 2016 due to alcohol abuse, pAF on chronic anticoagulation, history of diabetes mellitus type 2, CVA, hypertension, CHRISTIAN on BiPAP.  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  He was recovering in [facility] and was transferred to [facility] for consideration of chest tube placement and possible intrapleural lysis for worsening effusion.    Acute metabolic encephalopathy with delirium, multifactorial  Hepatic encephalopathy  Chronic liver disease, history of liver transplant 2016  End-stage renal disease (ESRD), on hemodialysis (HD)  History of seizure, on Keppra and Vimpat  Waxing and waning mentation, coinciding with lactulose being held at [facility]  Earlier in hospital stay, remained confused and had pulled out tracheostomy tube, PICC line and pulled his dialysis catheter.  Lines replaced.  Palliative care consult 1/26, see note.  * Psychiatry consult 2/2, see note    Continues with fluctuating mentation, improved 2/17 on visit    Continue to reorient as needed; maintain normal day/night, sleep wake cycles; minimize sedating/altering medications as able    Continue Lactulose to 10 mg BID, monitor for symptoms    Zyprexa 5 mg at bedtime scheduled.  Melatonin 3 mg p.o. at bedtime scheduled    Olanzapine BID as needed for severe agitation or anxiety; monitor for side effets    Mittens/sitter as needed; minimize use of restraints as able    Ongoing hemodialysis, as per nephrology, on M-W-F  dialysis schedule; ongoing nephrology consult follow-up appreciated    Dialysis labs as per nephrology    Vitamin D level ordered per family request; nephrology managing    Psychiatry consult follow-up requested, last visit 2/2, appreciate help     Bilateral pleural effusions   Acute respiratory failure requiring tracheostomy  - resolved    Pneumonia  - resolved   ARDS  - resolved    Right pneumothorax - resolved   *Initial event was parainfluenza and strep pneumo pneumonia back in November 2022. Treated for ARDS. Had failed extubation x2 and tracheostomy performed on previous hospitalization. *Had additional complications of bilateral pneumothoraces as well as hydropneumothorax- pleural fluid grew candida parapsilosis  *Completed 4-week antifungal treatment. [facility]  *Recently there were concern for worsening effusion while at [facility] and had thoracentesis 01/13 which returned exudative without growth on cultures. CT chest/abdomen/pelvis on 01/18 demonstrated worsening effusions and concerns for infected parapneumonic effusions with possible SBP.  Multiple pulmonologist at [facility] reviewed CT scan recommended transfer to [facility] for consideration of chest tube placement and possible intrapleural lysis  *Thoracic surgery (Dr. Jackson) consulted during admission   *CT chest 1/22, small effusions on imaging and so chest tube was not inserted.   ID consulted, see note 2/10.  *Patient pulled out his tracheostomy tube on the night 2/1-2/2 and is tolerating well without increased shortness of breath persistent cough or worsening hypoxia  * Chest x-ray 2/3 with cardiomegaly, chronic small bilateral pleural effusions, no pulmonary edema; right internal jugular dialysis catheter in place    Monitor respiratory status, recently stable on room air    Encourage incentive spirometry as able    Wound care previously consulted for tracheostomy site care, monitor for signs and  "symptoms of infection     S/p liver transplant 2016   Chronic immunosuppression, on tacrolimus  Cirrhosis with ascites  Subacute bacterial peritonitis (SBP), resolved  *Main manifestations of this are hepatic encephalopathy and ascites.  Hepatologist at [place] felt that most likely reason for the cirrhosis was metabolic syndrome or alcohol intake.  There was no evidence of rejection on biopsy and there was some evidence of regeneration. Paracentesis done on 12/22/2022 showed lactobacillus indicating SBP, treated with Unasyn and Augmentin, finished 2-week course 1/12/2023.  Requires large-volume paracentesis periodically for recurring ascites.    *Paracentesis 1/13/2023 yielded about 7500 cc. Cultures NGTD.  Most recent paracentesis on 1/24 with 4.8 L fluid removal    Continue intermittent paracentesis as needed if develops symptomatic ascites    Monitor for signs and symptoms of recurrent spontaneous bacterial peritonitis     Further treatment for recurrent ascites with TIPS deferred to his Liver Transplant team and/or Hepatology, he has an appointment on 4/21/2023 with Hepatology, Dr. Simms    Continue Tacrolimus 1 mg BID.    Continue rifaximin 550 mg BID    Continue lactulose as ordered, titrate as needed    GI consult as needed in hospital for new acute issues, otherwise as outpatient    Goals of care   As per prior rounding provider, \"on 1/25 nursing had mentioned that patient had refused to undergo dialysis and want to stop care, palliative care was consulted.  Patient and his spouse denied wanting to stop care and wanted ongoing restorative care including full code\"    Monitor, Full Code status     Diarrhea, improved, on lactulose for chronic liver disease  - C difficile negative on 1/31. Secondary to lactulose.  - discontinued rectal tube (2/12) as stools more formed    Continue lactulose with goal of 2 to 3 soft bowel movement in 24 hours     Paroxysmal atrial fibrillation  Chronic anticoagulation, " apixaban  Remains in sinus rhythm, on anticoagulation with apixaban indefinitely for stroke prophylaxis.      Monitor rhythm    Continue apixaban anticoagulation    Monitor hemoglobin, see below     Acute anemia  Pt has required intermittent transfusions for on-going anemia.  Anemia most likely secondary to critical illness/chronic disease, chronic renal disease.  Baseline hemoglobin around 7-8    Transfuse packed red blood cells to keep hemoglobin > 7    Hemoglobin 8.1 on 2/17    Epoetin lfa-epbx use as per nephrology     History PEA arrest   PEA arrest prior to his previous admission and 1 episode of PEA associated with desaturation on 12/20.  No structural cardiac disease.     Stable, continue cardiac Monitoring     Chronic Hypotension  In the past has developed baseline hypotension with cirrhosis and prolonged critical illness.  On hemodialysis.    Continue midodrine, as per nephrology      History of seizure   After hypoxic event, in the context of baseline multifactorial encephalopathy secondary to his ongoing critical illness and prior cardiac arrests and prior strokes and cirrhosis with hepatic encephalopathy. MRI of head of 12/20/22 reveals no PRES or leptomeningeal enhancement but scattered.  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   No recent seizure activity.    Continue levetiracetam, lacosamide     Seizure precautions    Outpatient follow-up with neurology, consult inpatient if needed      Diabetes mellitus type 2  *Hemoglobin A1c on November 2022 was 4.7  *By report, on Lantus insulin in the past.  Blood sugar checks and sliding scale insulin previously discontinued as has been stable without insulin needs    Monitor with periodic blood sugar checks as needed     Severe malnutrition, protein and calorie type  Moderate dysphagia  G-tube feedings    Continue with tube feeds via PEG tube    IR replaced the PEG tube on 2/8/2023,  monitor    Nutritionist consulted and following for tube feeds    SLP following as well    Oral diet as per speech and language therapy (SLP)      Anxiety  Fluoxetine was discontinued as per psychiatry recommendation on 2/2 (see consult note for details)     Stable, monitor; comfort and reassurance offered during visit    Psychiatry follow-up     Disposition     Estimated length of stay several days    Anticipated discharge to transitional care unit (TCU), long-term care, or LTACH; social work consulted, help appreciated; previously at [facility] and by report not able to return there (note 2/10)    Patient's wife, Ofe Osborne, called and updated 2/17, she was appreciative of call     Diet: Room Service  Adult Formula Bolus Feeding: Novasource Renal; Route: Gastrostomy; 3 times daily; Volume per Bolus: 240; mL(s); 80 mL/hr x 3 hrs back up bolus feeding after each meal ONLY IF INTAKE <50% of meal  Snacks/Supplements Adult: Other; 4 oz Ensure with breakfast, Gelatein+ with lunch and magic cup with dinner (RD); With Meals  Soft & Bite Sized Diet (level 6) Liquidized/Moderately Thick (level 3) (By tsp only); No Straws    DVT Prophylaxis: DOAC  Nixon Catheter: Not present  Lines: PRESENT      CVC Double Lumen Right External jugular Tunneled-Site Assessment: WDL      Cardiac Monitoring: None  Code Status: Full Code      Clinically Significant Risk Factors           # Hypercalcemia: corrected calcium is >10.1, will monitor as appropriate    # Hypoalbuminemia: Lowest albumin = 1.9 g/dL at 1/23/2023  6:38 AM, will monitor as appropriate   # Thrombocytopenia: Lowest platelets = 104 in last 2 days, will monitor for bleeding          # Severe Malnutrition: based on nutrition assessment        Disposition Plan      Expected Discharge Date: 02/21/2023, 12:00 PM    Destination: inpatient rehabilitation facility  Discharge Comments: admitted on 1-21 from [facility], Dialysis pt MWF. Sitter for restlessness. Will need  "placement          Renny Erwin MD  Hospitalist Service  [facility]  Securely message with [email or web address] (more info)  Text page via [employer] Paging/Directory   ______________________________________________________________________    Interval History   Lying in bed,more calm and cooperative today, oriented to \"[facility]\" and states the year is \"2022\"; no report of pain    Physical Exam   Vital Signs: Temp: 98  F (36.7  C) Temp src: Oral BP: 100/60 Pulse: 83   Resp: 27 SpO2: 95 % O2 Device: None (Room air)    Weight: 193 lbs 1.97 oz    GENERAL awake and alert, lying in bed, 1:1 at the bedside  HEENT no conjunctival injection or jaundice  LUNGS mild to moderate inspiratory effort, no wheezes or crackles  HEART S1, S2 regular rate and rhythm; no rubs or gallops  ABDOMEN soft, nontender to palpation; no guarding, rebound, or rigidity; abdominal binder in place, stable; percutaneous endoscopic gastrostomy (PEG) site intact   MUSCULOSKELETAL extremities with mild pedal edema  SKIN warm and dry; dialysis cathter right upper chest intact and unchanged  NEURO moves upper and lower extremities spontaneously and to command  MENTAL STATUS answering questions and following simple commands    Medical Decision Making             Data     I have personally reviewed the following data over the past 24 hrs:    6.1  \   8.1 (L)   / 104 (L)     136 94 (L) 46.6 (H) /  96   4.4 32 (H) 3.92 (H) \       ALT: 9 (L) AST: 37 AP: 216 (H) TBILI: 0.4   ALB: 2.3 (L) TOT PROTEIN: 6.5 LIPASE: N/A       Imaging results reviewed over the past 24 hrs:   No results found for this or any previous visit (from the past 24 hour(s)).  "

## 2023-02-17 NOTE — PROGRESS NOTES
Potassium   Date Value Ref Range Status   02/13/2023 4.7 3.4 - 5.3 mmol/L Final   12/29/2022 3.4 3.4 - 5.3 mmol/L Final   04/20/2020 3.9 3.4 - 5.3 mmol/L Final     Potassium POCT   Date Value Ref Range Status   01/19/2023 3.6 3.5 - 5.0 mmol/L Final     Hemoglobin   Date Value Ref Range Status   02/13/2023 7.7 (L) 13.3 - 17.7 g/dL Final   04/20/2020 14.3 13.3 - 17.7 g/dL Final     Creatinine   Date Value Ref Range Status   02/13/2023 4.07 (H) 0.67 - 1.17 mg/dL Final   04/20/2020 1.06 0.66 - 1.25 mg/dL Final     Urea Nitrogen   Date Value Ref Range Status   02/13/2023 60.2 (H) 6.0 - 20.0 mg/dL Final   12/29/2022 46 (H) 7 - 30 mg/dL Final   04/20/2020 23 7 - 30 mg/dL Final     Sodium   Date Value Ref Range Status   02/13/2023 142 136 - 145 mmol/L Final   04/20/2020 139 133 - 144 mmol/L Final     INR   Date Value Ref Range Status   12/21/2022 1.36 (H) 0.85 - 1.15 Final   10/07/2019 1.20 (H) 0.86 - 1.14 Final      Latest Reference Range & Units Most Recent   Hep B Surface Agn Nonreactive  Nonreactive  2/2/23 06:18   Hepatitis B Core Angela Nonreactive  Nonreactive  2/2/23 06:18   Hepatitis B Surface Antibody Instrument Value <8.00 m[IU]/mL 0.54  2/2/23 06:18     DIALYSIS PROCEDURE NOTE  Hepatitis status of previous patient on machine log was checked and verified ok to use with this patients hepatitis status.  Patient dialyzed for 3 hrs. on a K3 bath with a net fluid removal of  1.5L.  A BFR of 400 ml/min was obtained via a CVC Tunelled.  The treatment plan was discussed with Dr. Laboy during the treatment.    Total heparin received during the treatment: 0 units.     Line flushed, clamped and capped with heparin 1:1000 1.6 mL (1600 units) per lumen    Meds  given: retacrit 20,000 units   Complications: none      Person educated: patient. Knowledge base minimal. Barriers to learning: none. Educated on procedure via verbal mode. Patient/ verbalized understanding. Pt prefers oral education style.     ICEBOAT? Timeout  performed pre-treatment  I: Patient was identified using 2 identifiers  C:  Consent Signed Yes  E: Equipment preventative maintenance is current and dialysis delivery system OK to use  B: Hepatitis B Surface Antigen: NONREACTIVE; Draw Date: 02/02/2023      Hepatitis B Surface Antibody: SUSCEPTIBLE; Draw Date: 02/02/2023  O: Dialysis orders present and complete prior to treatment  A: Vascular access verified and assessed prior to treatment  T: Treatment was performed at a clinically appropriate time  ?: Patient was allowed to ask questions and address concerns prior to treatment  See Adult Hemodialysis flowsheet in [employer] for further details and post assessment.  Machine water alarm in place and functioning. Transducer pods intact and checked every 15min.   Pt returned via bed.  Chlorine/Chloramine water system checked every 4 hours.      Post treatment report given to EVELYN Judd RN regarding 1.5L of fluid removed.

## 2023-02-17 NOTE — PROGRESS NOTES
Renal Medicine Progress Note            Assessment/Plan:     Following ARF without recovery/current dialysis dependence         1) End Stage Kidney Disease (CKD and subsequent ATN with non-recovery)  Initially LORETTA, but no renal recovery (anuric/oliguric), now considered ESRD. 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 Will repeat monthly, added onto today's labs.      HD complicated with hypotension. On midodrine 10mg TID.     2) Anemia,  persistent anemia despite adequate iron stores and high epo dosing suggesting some epo resistance.      3) disposition:   not returning to LTACH. In case not able to go to inpatient rehab, would consider [facility] for TCU/rehab and dialysis.     4) Diffuse pruritis  No hyperphosphatemia, though pruritis might be related to his renal disease. Trial urea cream     Other:  Hx liver transplant 2016  Confusion/encephlopathy  DM  AMS, delirium        Plan/Recs:  1) HD today, continue MWF schedule  2) Agree that In current state not outpatient dialysis candidate   3) Will perform monthly dialysis labs while inpatient given prolonged stay. Vit D, PTH ordered today. Repeat iron studies at end of month.  4) Over weekend, nephrology will not be seeing patient. If acute issues arise, please page.     Dwayne Laboy MD   [facility] consultants  Office: [phone]          Interval History:     Pt seen during dialysis   Seemed to be tolerating it well though BP quite low despite midodrine   Mentating ok, though at times difficult to follow conversation. Asks some appropriate questions regarding renal recovery and then will ask others that make no sense   Reports occassional SOB, but none currently   Complains of itching in legs and some back pain due to positioning, his bedside RN came down to assist with this   He reports UOP, but this is not consistent with nursing reports           Medications and Allergies:       - MEDICATION  INSTRUCTIONS for Dialysis Patients -   Does not apply See Admin Instructions     sodium chloride 0.9%  250 mL Intravenous Once in dialysis/CRRT     sodium chloride 0.9%  300 mL Hemodialysis Machine Once     apixaban ANTICOAGULANT  5 mg Oral BID     epoetin mirta-epbx  20,000 Units Intravenous Once in dialysis/CRRT     folic acid  1 mg Oral or Feeding Tube Daily     sodium chloride (PF) 0.9%  10 mL Intracatheter Once in dialysis/CRRT    Followed by     heparin  1.3-2.6 mL Intracatheter Once in dialysis/CRRT     sodium chloride (PF) 0.9%  10 mL Intracatheter Once in dialysis/CRRT    Followed by     heparin  1.3-2.6 mL Intracatheter Once in dialysis/CRRT     lacosamide  100 mg Oral or Feeding Tube BID     lactulose  10 g Oral BID     levETIRAcetam  1,000 mg Oral or Feeding Tube Q24H     lidocaine  1 patch Transdermal Q24H     lidocaine   Transdermal Q8H BIJU     melatonin  3 mg Oral At Bedtime     midodrine  10 mg Oral or Feeding Tube TID w/meals     mineral oil-hydrophilic petrolatum   Topical Daily     - MEDICATION INSTRUCTIONS -   Does not apply Once     nystatin  500,000 Units Swish & Spit 4x Daily     OLANZapine zydis  5 mg Oral At Bedtime     pantoprazole  40 mg Oral or Feeding Tube BID AC     rifaximin  550 mg Oral or Feeding Tube BID     sodium chloride (PF)  10-30 mL Intracatheter Q8H     sodium chloride (PF)  3 mL Intracatheter Q8H     tacrolimus  1 mg Oral BID IS      No Known Allergies         Physical Exam:   Vitals were reviewed  BP (!) 83/54   Pulse 93   Temp 98.4  F (36.9  C) (Axillary)   Resp 25   Wt 87.6 kg (193 lb 2 oz)   SpO2 90%   BMI 26.19 kg/m      Wt Readings from Last 3 Encounters:   02/17/23 87.6 kg (193 lb 2 oz)   01/21/23 82.4 kg (181 lb 11.2 oz)   12/28/22 96 kg (211 lb 10.3 oz)       Intake/Output Summary (Last 24 hours) at 2/16/2023 1036  Last data filed at 2/16/2023 0900  Gross per 24 hour   Intake 380 ml   Output 1500 ml   Net -1120 ml       GENERAL: alert, no acute distress,  resting comfortably in bed   HEENT: NC/AT, dry MM  RESP: clear  CV: RRR, normal S1 S2  MS: no edema  SKIN: catheter site clean without drainage           Data:     BMP  Recent Labs   Lab 02/17/23  0808 02/13/23  0850    142   POTASSIUM 4.4 4.7   CHLORIDE 94* 101   KELLY 9.3 9.7   CO2 32* 34*   BUN 46.6* 60.2*   CR 3.92* 4.07*   GLC 96 93     CBC  Recent Labs   Lab 02/17/23  0808 02/13/23  0850   WBC 6.1 5.7   HGB 8.1* 7.7*   HCT 28.5* 26.7*   * 102*   * 126*     Lab Results   Component Value Date    AST 37 02/17/2023    ALT 9 (L) 02/17/2023    ALKPHOS 216 (H) 02/17/2023    BILITOTAL 0.4 02/17/2023    CHANDLER 29 02/14/2023     Lab Results   Component Value Date    INR 1.36 (H) 12/21/2022       Attestation:  I have reviewed today's vital signs, notes, medications, labs and imaging.    Dwayne Laboy MD  [facility] - Nephrology  Office: [phone]

## 2023-02-17 NOTE — PLAN OF CARE
Goal Outcome Evaluation:                      Patient A&0x2-3. Alternates between confusion and knowing he is in the hospital. He Had dialysis this morning. Dialysis port in R chest is CDI.  Ate 100% at lunch. Abdomin is distended and firm but not tender. Lungs diminished and fine crackles noted in bases. On RA. Trace edema noted on feet. Peg tube is in place and CDI. Abdominal binder in place to protect Peg tube. IV SL.  Tylenol given for back pain. Up with lift. Moves well in bed. Uses bedpan.

## 2023-02-17 NOTE — PLAN OF CARE
Goal Outcome Evaluation:    COGNITION/MENTATION: A/O x 2, -situation, -time  NEURO/CMS:   CARDIAC/TELE: Tele discontinued, pt refused  RESPIRATORY: On RA. LS . Trach, capped.   GI: BS+, incontinent, rectal tube  : Anuric, on hemodialysis  PAIN: No non-verbal signs of pain noted  SKIN: Scattered skin tears, petechiae, bruising. Non-blanchable redness to coccyx  DRAINS/LINES: CVC, PICC, rectal tube, PEG tube  ACTIVITY: A of 2, T&R , lift  DIET: Moderate thick, soft bite size, TF supplement TID if <50% is consumed w/meals     B, 142.          Azelaic Acid Counseling: Patient counseled that medicine may cause skin irritation and to avoid applying near the eyes.  In the event of skin irritation, the patient was advised to reduce the amount of the drug applied or use it less frequently.   The patient verbalized understanding of the proper use and possible adverse effects of azelaic acid.  All of the patient's questions and concerns were addressed.

## 2023-02-17 NOTE — PLAN OF CARE
DATE & TIME: 2/16/23, 0300 - 4443   Cognitive Concerns/ Orientation : A&O x self only, mentation fluctuate  BEHAVIOR & AGGRESSION TOOL COLOR: Green, mood labile   ABNL VS/O2: VSS on room air  MOBILITY: Up with lift, Turns self from side to side independently in bed  PAIN MANAGMENT: Denied  DIET: Soft, bite sized with moderately thick liquids. Bolus feed via PEG if takes > 50% of meals  BOWEL/BLADDER: Incontinent of B/B  ABNL LAB/BG: NA  DRAIN/DEVICES: PIV SL, PEG with abdominal binder in place and right external jugular tunneled CVC  TELEMETRY RHYTHM: NA  SKIN: Scattered scabs and bruises. Blanchable redness to coccyx and elbows. Mepilex in place to elbows bilaterally  TESTS/PROCEDURES: For dialysis thi am  D/C DATE: Pending progress  OTHER IMPORTANT INFO: Patient intermittently restless. Sitter at bedside for safety

## 2023-02-17 NOTE — PROGRESS NOTES
SPIRITUAL HEALTH SERVICES Progress Note       66     Per Blanco's request yesterday, I visited with pt to distribute pre-blessed communion this afternoon. Checked-in with bedside RN about diet restrictions and they subsequently added thickener to the communion grape juice.      remains available to support this pt/family and I will continue to follow while on unit 66. Please consult if any immediate needs arise.      ------  Vitor Palacios M.Div.  Resident   Units: 66, 73, Ortho Spine  Pager: [phone]

## 2023-02-17 NOTE — PLAN OF CARE
DATE & TIME: 2/16/23 1082-1749   Cognitive Concerns/ Orientation : Pt A/Ox2 disorientated to place and situation  BEHAVIOR & AGGRESSION TOOL COLOR: Green   ABNL VS/O2: VSS on RA  MOBILITY: Lift, fall risk. Reposition at times. Pt does move around frequently in bed.  PAIN MANAGMENT: c/o abd pain, given PRN tylenol x1  DIET: Soft, bite sized with moderately thick liquids. Total feed. Given bolus x2 (for lunch and dinner) due to <50% meals eaten  BOWEL/BLADDER: Incontinent of bowel and bladder. Dialysis patient, little output. BM x2 this shift.   DRAIN/DEVICES: PIV SL. Right external jugular tunneled CVC. PEG tube with abdominal binder.  SKIN: Scattered scabs and bruises. Blanchable redness to coccyx and elbows. Mepilex on bilateral elbows.  D/C DATE: Discharge pending progress  OTHER IMPORTANT INFO: Intermittently restless and attempting to get out of bed..Bolus tube feeds TID if oral intake less than 50%, nephrology following,  visited

## 2023-02-18 ENCOUNTER — APPOINTMENT (OUTPATIENT)
Dept: PHYSICAL THERAPY | Facility: CLINIC | Age: 58
DRG: 981 | End: 2023-02-18
Attending: STUDENT IN AN ORGANIZED HEALTH CARE EDUCATION/TRAINING PROGRAM
Payer: COMMERCIAL

## 2023-02-18 PROBLEM — G93.40 ENCEPHALOPATHY: Status: ACTIVE | Noted: 2022-12-23

## 2023-02-18 PROCEDURE — 97110 THERAPEUTIC EXERCISES: CPT | Mod: GP

## 2023-02-18 PROCEDURE — 120N000001 HC R&B MED SURG/OB

## 2023-02-18 PROCEDURE — 250N000012 HC RX MED GY IP 250 OP 636 PS 637: Performed by: STUDENT IN AN ORGANIZED HEALTH CARE EDUCATION/TRAINING PROGRAM

## 2023-02-18 PROCEDURE — 250N000013 HC RX MED GY IP 250 OP 250 PS 637

## 2023-02-18 PROCEDURE — 250N000013 HC RX MED GY IP 250 OP 250 PS 637: Performed by: INTERNAL MEDICINE

## 2023-02-18 PROCEDURE — 250N000013 HC RX MED GY IP 250 OP 250 PS 637: Performed by: STUDENT IN AN ORGANIZED HEALTH CARE EDUCATION/TRAINING PROGRAM

## 2023-02-18 PROCEDURE — 250N000013 HC RX MED GY IP 250 OP 250 PS 637: Performed by: HOSPITALIST

## 2023-02-18 PROCEDURE — 99232 SBSQ HOSP IP/OBS MODERATE 35: CPT | Performed by: HOSPITALIST

## 2023-02-18 PROCEDURE — 250N000009 HC RX 250: Performed by: STUDENT IN AN ORGANIZED HEALTH CARE EDUCATION/TRAINING PROGRAM

## 2023-02-18 PROCEDURE — 97530 THERAPEUTIC ACTIVITIES: CPT | Mod: GP

## 2023-02-18 RX ADMIN — OLANZAPINE 5 MG: 5 TABLET, ORALLY DISINTEGRATING ORAL at 21:45

## 2023-02-18 RX ADMIN — MELATONIN TAB 3 MG 3 MG: 3 TAB at 21:45

## 2023-02-18 RX ADMIN — RIFAXIMIN 550 MG: 550 TABLET ORAL at 21:45

## 2023-02-18 RX ADMIN — MIDODRINE HYDROCHLORIDE 10 MG: 5 TABLET ORAL at 18:07

## 2023-02-18 RX ADMIN — Medication 40 MG: at 08:23

## 2023-02-18 RX ADMIN — NYSTATIN 500000 UNITS: 100000 SUSPENSION ORAL at 12:02

## 2023-02-18 RX ADMIN — MIDODRINE HYDROCHLORIDE 10 MG: 5 TABLET ORAL at 12:02

## 2023-02-18 RX ADMIN — WHITE PETROLATUM: 1.75 OINTMENT TOPICAL at 08:25

## 2023-02-18 RX ADMIN — LEVETIRACETAM 1000 MG: 100 SOLUTION ORAL at 21:56

## 2023-02-18 RX ADMIN — TACROLIMUS 1 MG: 5 CAPSULE ORAL at 08:23

## 2023-02-18 RX ADMIN — LACOSAMIDE 100 MG: 10 SOLUTION ORAL at 08:24

## 2023-02-18 RX ADMIN — MIDODRINE HYDROCHLORIDE 10 MG: 5 TABLET ORAL at 08:23

## 2023-02-18 RX ADMIN — APIXABAN 5 MG: 5 TABLET, FILM COATED ORAL at 08:23

## 2023-02-18 RX ADMIN — RIFAXIMIN 550 MG: 550 TABLET ORAL at 08:23

## 2023-02-18 RX ADMIN — FOLIC ACID 1 MG: 1 TABLET ORAL at 08:23

## 2023-02-18 RX ADMIN — ACETAMINOPHEN 650 MG: 325 TABLET, FILM COATED ORAL at 12:02

## 2023-02-18 RX ADMIN — LIDOCAINE 1 PATCH: 560 PATCH PERCUTANEOUS; TOPICAL; TRANSDERMAL at 08:24

## 2023-02-18 RX ADMIN — ACETAMINOPHEN 650 MG: 325 TABLET, FILM COATED ORAL at 18:07

## 2023-02-18 RX ADMIN — NYSTATIN 500000 UNITS: 100000 SUSPENSION ORAL at 21:46

## 2023-02-18 RX ADMIN — LACTULOSE 10 G: 10 SOLUTION ORAL at 08:24

## 2023-02-18 RX ADMIN — Medication 40 MG: at 18:11

## 2023-02-18 RX ADMIN — NYSTATIN 500000 UNITS: 100000 SUSPENSION ORAL at 08:23

## 2023-02-18 RX ADMIN — APIXABAN 5 MG: 5 TABLET, FILM COATED ORAL at 21:45

## 2023-02-18 ASSESSMENT — ACTIVITIES OF DAILY LIVING (ADL)
ADLS_ACUITY_SCORE: 64
ADLS_ACUITY_SCORE: 58
ADLS_ACUITY_SCORE: 58
ADLS_ACUITY_SCORE: 64
ADLS_ACUITY_SCORE: 56
ADLS_ACUITY_SCORE: 56
ADLS_ACUITY_SCORE: 64
ADLS_ACUITY_SCORE: 56
ADLS_ACUITY_SCORE: 64
ADLS_ACUITY_SCORE: 58
ADLS_ACUITY_SCORE: 58
ADLS_ACUITY_SCORE: 64

## 2023-02-18 NOTE — PROGRESS NOTES
[facility]    Medicine Progress Note - Hospitalist Service    Date of Admission:  1/21/2023    Assessment & Plan   Blanco Osborne is a 57 year-old male admitted on 1/21/2023 as a transfer from [facility] for evaluation of loculated pleural effusion. He has extensive PMH including h/o liver transplant 2016 due to alcohol abuse, pAF on chronic anticoagulation, history of diabetes mellitus type 2, CVA, hypertension, CHRISTIAN on BiPAP.  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  He was recovering in [facility] and was transferred to [facility] for consideration of chest tube placement and possible intrapleural lysis for worsening effusion.    Acute metabolic encephalopathy with delirium, multifactorial  Hepatic encephalopathy  Chronic liver disease, history of liver transplant 2016  End-stage renal disease (ESRD), on hemodialysis (HD)  History of seizure, on Keppra and Vimpat  Waxing and waning mentation, coinciding with lactulose being held at [facility]  Earlier in hospital stay, remained confused and had pulled out tracheostomy tube, PICC line and pulled his dialysis catheter.  Lines replaced.  Palliative care consult 1/26, see note.  * Psychiatry consulted 2/2, see note, follow-up requested.    On/off fluctuating mentation, recently improving 2/17, 2/18    Continue to reorient as needed; maintain normal day/night, sleep wake cycles; minimize sedating/altering medications as able    Continue Lactulose to 10 mg BID, monitor for symptoms    Zyprexa 5 mg at bedtime scheduled.  Melatonin 3 mg p.o. at bedtime scheduled    Olanzapine BID as needed for severe agitation or anxiety; monitor for side effets    Mittens/sitter as needed; minimize use of restraints as able    Ongoing hemodialysis, as per  nephrology, on M-W-F dialysis schedule; ongoing nephrology consult follow-up appreciated    Dialysis labs as per nephrology    Vitamin D level ordered per family request; nephrology managing and treating    Psychiatry consult follow-up requested, last visit 2/2, help appreciated     Bilateral pleural effusions   Acute respiratory failure requiring tracheostomy  - resolved    Pneumonia  - resolved   ARDS  - resolved    Right pneumothorax - resolved   *Initial event was parainfluenza and strep pneumo pneumonia back in November 2022. Treated for ARDS. Had failed extubation x2 and tracheostomy performed on previous hospitalization. *Had additional complications of bilateral pneumothoraces as well as hydropneumothorax- pleural fluid grew candida parapsilosis  *Completed 4-[facility] antifungal treatment. [facility]  *Recently there were concern for worsening effusion while at [facility] and had thoracentesis 01/13 which returned exudative without growth on cultures. CT chest/abdomen/pelvis on 01/18 demonstrated worsening effusions and concerns for infected parapneumonic effusions with possible SBP.  Multiple pulmonologist at [facility] reviewed CT scan recommended transfer to [facility] for consideration of chest tube placement and possible intrapleural lysis  *Thoracic surgery (Dr. Jackson) consulted during admission   *CT chest 1/22, small effusions on imaging and so chest tube was not inserted.   ID consulted, see note 2/10.  *Patient pulled out his tracheostomy tube on the night 2/1-2/2 and is tolerating well without increased shortness of breath persistent cough or worsening hypoxia  * Chest x-ray 2/3 with cardiomegaly, chronic small bilateral pleural effusions, no pulmonary edema; right internal jugular dialysis catheter in place    Monitor respiratory status, recently stable on room air    Encourage incentive spirometry as able    Wound care previously consulted for  "tracheostomy site care, monitor for signs and symptoms of infection     S/p liver transplant 2016   Chronic immunosuppression, on tacrolimus  Cirrhosis with ascites  Subacute bacterial peritonitis (SBP), resolved  *Main manifestations of this are hepatic encephalopathy and ascites.  Hepatologist at [facility] felt that most likely reason for the cirrhosis was metabolic syndrome or alcohol intake.  There was no evidence of rejection on biopsy and there was some evidence of regeneration. Paracentesis done on 12/22/2022 showed lactobacillus indicating SBP, treated with Unasyn and Augmentin, finished 2-week course 1/12/2023.  Requires large-volume paracentesis periodically for recurring ascites.    *Paracentesis 1/13/2023 yielded about 7500 cc. Cultures NGTD.  Most recent paracentesis on 1/24 with 4.8 L fluid removal    Continue intermittent paracentesis as needed if develops symptomatic ascites    Monitor for signs and symptoms of recurrent spontaneous bacterial peritonitis     Further treatment for recurrent ascites with TIPS deferred to his Liver Transplant team and/or Hepatology, he has an appointment on 4/21/2023 with Hepatology, Dr. Simms    Continue Tacrolimus 1 mg BID.    Continue rifaximin 550 mg BID    Continue lactulose as ordered, titrate as needed    GI consult as needed in hospital for new acute issues, otherwise as outpatient    Goals of care   As per prior rounding provider, \"on 1/25 nursing had mentioned that patient had refused to undergo dialysis and want to stop care, palliative care was consulted.  Patient and his spouse denied wanting to stop care and wanted ongoing restorative care including full code\"    Monitor, Full Code status     Diarrhea, improved, on lactulose for chronic liver disease  - C difficile negative on 1/31. Secondary to lactulose.  - discontinued rectal tube (2/12) as stools more formed    Continue lactulose with goal of 2 to 3 soft bowel movement in 24 hours     Paroxysmal atrial " fibrillation  Chronic anticoagulation, apixaban  Remains in sinus rhythm, on anticoagulation with apixaban indefinitely for stroke prophylaxis.      Monitor rhythm    Continue apixaban anticoagulation    Monitor hemoglobin, see below     Acute anemia  Pt has required intermittent transfusions for on-going anemia.  Anemia most likely secondary to critical illness/chronic disease, chronic renal disease.  Baseline hemoglobin around 7-8    Transfuse packed red blood cells to keep hemoglobin > 7    Hemoglobin 8.1 on 2/17    Epoetin lfa-epbx use as per nephrology     History PEA arrest   PEA arrest prior to his previous admission and 1 episode of PEA associated with desaturation on 12/20.  No structural cardiac disease.     Stable, continue cardiac Monitoring     Chronic Hypotension  In the past has developed baseline hypotension with cirrhosis and prolonged critical illness.  On hemodialysis.    Continue midodrine, as per nephrology      History of seizure   After hypoxic event, in the context of baseline multifactorial encephalopathy secondary to his ongoing critical illness and prior cardiac arrests and prior strokes and cirrhosis with hepatic encephalopathy. MRI of head of 12/20/22 reveals no PRES or leptomeningeal enhancement but scattered.  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   No recent seizure activity.    Continue levetiracetam, lacosamide     Seizure precautions    Outpatient follow-up with neurology, consult inpatient if needed      Diabetes mellitus type 2  *Hemoglobin A1c on November 2022 was 4.7  *By report, on Lantus insulin in the past.  Blood sugar checks and sliding scale insulin previously discontinued as has been stable without insulin needs    Monitor with periodic blood sugar checks as needed     Severe malnutrition, protein and calorie type  Moderate dysphagia  G-tube feedings    Continue with tube feeds via PEG tube    IR replaced the PEG  tube on 2/8/2023, monitor    Nutritionist consulted and following for tube feeds    SLP following as well    Oral diet as per speech and language therapy (SLP)      Anxiety  Fluoxetine was discontinued as per psychiatry recommendation on 2/2 (see consult note for details)     Stable, monitor; comfort and reassurance offered during visit    Psychiatry follow-up     Disposition     Estimated length of stay several days    Anticipated discharge to transitional care unit (TCU), long-term care, or LTACH; social work consulted, help appreciated; previously at [facility] and by report not able to return there (note 2/10)    Patient's wife, Ofe Osborne, called and updated 2/17, she was appreciative of call    Patient's brother, Dylan Osborne, updated at the bedside 2/18     Diet: Room Service  Adult Formula Bolus Feeding: Novasource Renal; Route: Gastrostomy; 3 times daily; Volume per Bolus: 240; mL(s); 80 mL/hr x 3 hrs back up bolus feeding after each meal ONLY IF INTAKE <50% of meal  Snacks/Supplements Adult: Other; 4 oz Ensure with breakfast, Gelatein+ with lunch and magic cup with dinner (RD); With Meals  Soft & Bite Sized Diet (level 6) Liquidized/Moderately Thick (level 3) (By tsp only); No Straws    DVT Prophylaxis: DOAC  Nixon Catheter: Not present  Lines: PRESENT      CVC Double Lumen Right External jugular Tunneled-Site Assessment: WDL      Cardiac Monitoring: None  Code Status: Full Code      Clinically Significant Risk Factors           # Hypercalcemia: corrected calcium is >10.1, will monitor as appropriate    # Hypoalbuminemia: Lowest albumin = 1.9 g/dL at 1/23/2023  6:38 AM, will monitor as appropriate   # Thrombocytopenia: Lowest platelets = 104 in last 2 days, will monitor for bleeding          # Severe Malnutrition: based on nutrition assessment        Disposition Plan      Expected Discharge Date: 02/21/2023,  3:00 PM    Destination: inpatient rehabilitation facility  Discharge Comments: admitted on  1-21 from [facility], Dialysis pt MWF. Sitter for restlessness. Will need placement          Renny Erwin MD  Hospitalist Service  [facility] [place]  Securely message with [employer] (more info)  Text page via [employer] Paging/Directory   ______________________________________________________________________    Interval History   Quiet, comfortable, resting in bed, calm, brother at the bedside    Physical Exam   Vital Signs: Temp: 98.4  F (36.9  C) Temp src: Oral BP: 92/56 Pulse: 90   Resp: 18 SpO2: 93 % O2 Device: None (Room air)    Weight: 193 lbs 1.97 oz    GENERAL lying in bed, comfortable, calm and cooperative, brother at the bedside  HEENT no conjunctival injection or jaundice  LUNGS mild to moderate inspiratory effort, no wheezes or crackles  HEART S1, S2 regular rate and rhythm; no rubs or gallops  ABDOMEN soft, nontender to palpation; no guarding, rebound, or rigidity; abdominal binder in place, stable; abdominal binder in place  MUSCULOSKELETAL extremities with mild pedal edema  SKIN warm and dry; dialysis cathter right upper chest  NEURO moves upper and lower extremities spontaneously and to command  MENTAL STATUS answering questions and following simple commands    Medical Decision Making             Data         Imaging results reviewed over the past 24 hrs:   No results found for this or any previous visit (from the past 24 hour(s)).   Creatinine   Date Value Ref Range Status   02/17/2023 3.92 (H) 0.67 - 1.17 mg/dL Final   04/20/2020 1.06 0.66 - 1.25 mg/dL Final     Recent Labs   Lab 02/17/23  0808 02/13/23  0850   HGB 8.1* 7.7*

## 2023-02-18 NOTE — PLAN OF CARE
Goal Outcome Evaluation:                      Patient A&0x2. He is intermittently confused.  Peg tube is clamped. Using Peg for medications. He is bit size moist foods with Mod thick liquids. Ate 100% of breakfast and lunch. No TF bolus needed.  Abdomin is distended, firm and non tender.  No edema noted.  Dp and radial pulses +2. Up with Mark steady when well rested otherwise use Lift. Using bedpan in bed now as trying to get back from chair in mark steady was unsafe as he was weak and  too tired to stand.

## 2023-02-18 NOTE — PLAN OF CARE
DATE & TIME: 2/16/23 [id number]   Cognitive Concerns/ Orientation : Pt A/Ox2 disorientated to place and situation  BEHAVIOR & AGGRESSION TOOL COLOR: Green   ABNL VS/O2: VSS on RA  MOBILITY: Lift, fall risk. Reposition q2h pt sometimes repositions himself. Up in chair this shift- only tolerated for about an hour with much encouragement for him to stay up in the chair longer than he wanted.  PAIN MANAGMENT: c/o abd pain, given PRN tylenol x1, oxy given x1 for back pain (repositioning, distraction, and tylenol were not effective)  DIET: Soft, bite sized with moderately thick liquids. Feeds himself- poor appetite. Given bolus due to <50% meal eaten.  BOWEL/BLADDER: Incontinent of bowel and bladder. Dialysis patient. Large BM x2 this shift- up to BSC with lift for 1 but mostly incontinent.   DRAIN/DEVICES: PIV SL. Right external jugular tunneled CVC. PEG tube with abdominal binder.  SKIN: Scattered scabs and bruises. Blanchable redness to coccyx and elbows. Mepilex on bilateral elbows.  D/C DATE: Discharge pending progress  OTHER IMPORTANT INFO: Intermittently restless and attempting to get out of bed at times.

## 2023-02-19 ENCOUNTER — APPOINTMENT (OUTPATIENT)
Dept: SPEECH THERAPY | Facility: CLINIC | Age: 58
DRG: 981 | End: 2023-02-19
Attending: STUDENT IN AN ORGANIZED HEALTH CARE EDUCATION/TRAINING PROGRAM
Payer: COMMERCIAL

## 2023-02-19 PROCEDURE — 250N000013 HC RX MED GY IP 250 OP 250 PS 637: Performed by: STUDENT IN AN ORGANIZED HEALTH CARE EDUCATION/TRAINING PROGRAM

## 2023-02-19 PROCEDURE — 250N000009 HC RX 250: Performed by: STUDENT IN AN ORGANIZED HEALTH CARE EDUCATION/TRAINING PROGRAM

## 2023-02-19 PROCEDURE — 92526 ORAL FUNCTION THERAPY: CPT | Mod: GN

## 2023-02-19 PROCEDURE — 250N000013 HC RX MED GY IP 250 OP 250 PS 637: Performed by: INTERNAL MEDICINE

## 2023-02-19 PROCEDURE — 250N000013 HC RX MED GY IP 250 OP 250 PS 637

## 2023-02-19 PROCEDURE — 250N000012 HC RX MED GY IP 250 OP 636 PS 637: Performed by: STUDENT IN AN ORGANIZED HEALTH CARE EDUCATION/TRAINING PROGRAM

## 2023-02-19 PROCEDURE — 250N000013 HC RX MED GY IP 250 OP 250 PS 637: Performed by: HOSPITALIST

## 2023-02-19 PROCEDURE — 120N000001 HC R&B MED SURG/OB

## 2023-02-19 PROCEDURE — 99232 SBSQ HOSP IP/OBS MODERATE 35: CPT | Performed by: HOSPITALIST

## 2023-02-19 RX ADMIN — NYSTATIN 500000 UNITS: 100000 SUSPENSION ORAL at 13:03

## 2023-02-19 RX ADMIN — FOLIC ACID 1 MG: 1 TABLET ORAL at 07:59

## 2023-02-19 RX ADMIN — LEVETIRACETAM 1000 MG: 100 SOLUTION ORAL at 21:34

## 2023-02-19 RX ADMIN — NYSTATIN 500000 UNITS: 100000 SUSPENSION ORAL at 21:23

## 2023-02-19 RX ADMIN — APIXABAN 5 MG: 5 TABLET, FILM COATED ORAL at 08:00

## 2023-02-19 RX ADMIN — MIDODRINE HYDROCHLORIDE 10 MG: 5 TABLET ORAL at 08:00

## 2023-02-19 RX ADMIN — MIDODRINE HYDROCHLORIDE 10 MG: 5 TABLET ORAL at 13:04

## 2023-02-19 RX ADMIN — LACOSAMIDE 100 MG: 10 SOLUTION ORAL at 01:03

## 2023-02-19 RX ADMIN — RIFAXIMIN 550 MG: 550 TABLET ORAL at 07:59

## 2023-02-19 RX ADMIN — TACROLIMUS 1 MG: 5 CAPSULE ORAL at 09:15

## 2023-02-19 RX ADMIN — APIXABAN 5 MG: 5 TABLET, FILM COATED ORAL at 21:23

## 2023-02-19 RX ADMIN — TACROLIMUS 1 MG: 5 CAPSULE ORAL at 01:00

## 2023-02-19 RX ADMIN — NYSTATIN 500000 UNITS: 100000 SUSPENSION ORAL at 07:59

## 2023-02-19 RX ADMIN — LACTULOSE 10 G: 10 SOLUTION ORAL at 21:23

## 2023-02-19 RX ADMIN — MELATONIN TAB 3 MG 3 MG: 3 TAB at 21:23

## 2023-02-19 RX ADMIN — ACETAMINOPHEN 650 MG: 325 TABLET, FILM COATED ORAL at 12:17

## 2023-02-19 RX ADMIN — Medication 40 MG: at 09:16

## 2023-02-19 RX ADMIN — MIDODRINE HYDROCHLORIDE 10 MG: 5 TABLET ORAL at 18:04

## 2023-02-19 RX ADMIN — RIFAXIMIN 550 MG: 550 TABLET ORAL at 21:23

## 2023-02-19 RX ADMIN — ACETAMINOPHEN 650 MG: 325 TABLET, FILM COATED ORAL at 01:46

## 2023-02-19 RX ADMIN — OLANZAPINE 5 MG: 5 TABLET, ORALLY DISINTEGRATING ORAL at 21:23

## 2023-02-19 RX ADMIN — LACOSAMIDE 100 MG: 10 SOLUTION ORAL at 09:29

## 2023-02-19 RX ADMIN — Medication 40 MG: at 16:21

## 2023-02-19 ASSESSMENT — ACTIVITIES OF DAILY LIVING (ADL)
ADLS_ACUITY_SCORE: 60
ADLS_ACUITY_SCORE: 64
ADLS_ACUITY_SCORE: 62
ADLS_ACUITY_SCORE: 64
ADLS_ACUITY_SCORE: 61
ADLS_ACUITY_SCORE: 62
ADLS_ACUITY_SCORE: 60
ADLS_ACUITY_SCORE: 64
ADLS_ACUITY_SCORE: 56
ADLS_ACUITY_SCORE: 62

## 2023-02-19 NOTE — PLAN OF CARE
Goal Outcome Evaluation:       DATE & TIME: 2/18/23 [id number]  Cognitive Concerns/ Orientation : Pt A/Ox2 disorientated to place and situation  BEHAVIOR & AGGRESSION TOOL COLOR: Green   ABNL VS/O2: VSS on RA  MOBILITY: Lift, fall risk. Reposition q2h pt sometimes repositions himself. Up in chair for dinner. PT was up in chair with mark steady in AM shift, but unable to get him back to bed via mark steady, lift used   PAIN MANAGMENT: c/o back pain- given PRN tylenol x1  DIET: Soft, bite sized with moderately thick liquids. Feeds himself- fair appetite. No boluses given yesterday.  BOWEL/BLADDER: Incontinent of bowel and bladder. Dialysis patient. BM x1 this shift, - lactulose held last evening due to x4 BM. DRAIN/DEVICES: PIV SL. Right external jugular tunneled CVC. PEG tube with abdominal binder.  SKIN: Scattered scabs and bruises. Blanchable redness to coccyx and elbows. Mepilex on bilateral elbows.   D/C DATE: Discharge pending progress  OTHER IMPORTANT INFO: Intermittently restless and attempting to get out of bed at times, long arm worked well with this patient this shift.

## 2023-02-19 NOTE — PLAN OF CARE
DATE & TIME: 2/18/23 [id number]  Cognitive Concerns/ Orientation : Pt A/Ox2 disorientated to place and situation  BEHAVIOR & AGGRESSION TOOL COLOR: Green   ABNL VS/O2: VSS on RA  MOBILITY: Lift, fall risk. Reposition q2h pt sometimes repositions himself. Up in chair for dinner. PT got pt in chair with mark steady in AM shift, but unable to get him back to bed via mark steady. Used lift to get patient into chair/commode this shift.  PAIN MANAGMENT: c/o back pain- given PRN tylenol x1  DIET: Soft, bite sized with moderately thick liquids. Feeds himself- fair appetite. No boluses given today.  BOWEL/BLADDER: Incontinent of bowel and bladder. Dialysis patient. BM x4 today- lactulose held this evening. DRAIN/DEVICES: PIV SL. Right external jugular tunneled CVC. PEG tube with abdominal binder.  SKIN: Scattered scabs and bruises. Blanchable redness to coccyx and elbows. Mepilex on bilateral elbows.   D/C DATE: Discharge pending progress  OTHER IMPORTANT INFO: Intermittently restless and attempting to get out of bed at times, long arm worked well with this patient this shift.

## 2023-02-19 NOTE — PLAN OF CARE
Goal Outcome Evaluation:       DATE & TIME: 2/18/23 [id number]  Cognitive Concerns/ Orientation : Pt A/Ox2 disorientated to place and situation  BEHAVIOR & AGGRESSION TOOL COLOR: Green   ABNL VS/O2: VSS on RA  MOBILITY: Lift, fall risk. Reposition q2h pt sometimes repositions himself. Up in chair for dinner. PT was up in chair with mark steady in AM shift, but unable to get him back to bed via mark steady, lift used   PAIN MANAGMENT: c/o back pain- given PRN tylenol x1  DIET: Soft, bite sized with moderately thick liquids. Feeds himself- fair appetite. No boluses given yesterday.  BOWEL/BLADDER: Incontinent of bowel and bladder. Dialysis patient. BM x1 this shift, - lactulose held last evening due to x4 BM. DRAIN/DEVICES: PIV SL. Right external jugular tunneled CVC. PEG tube with abdominal binder.  SKIN: Scattered scabs and bruises. Blanchable redness to coccyx and elbows. Mepilex on bilateral elbows.   D/C DATE: Discharge pending progress  OTHER IMPORTANT INFO: Intermittently restless and attempting to get out of bed at times, long arm worked well with this patient this shift.

## 2023-02-19 NOTE — PROGRESS NOTES
[facility]    Medicine Progress Note - Hospitalist Service    Date of Admission:  1/21/2023    Assessment & Plan   Blanco Osborne is a 57 year-old male admitted on 1/21/2023 as a transfer from [facility] for evaluation of loculated pleural effusion. He has extensive PMH including h/o liver transplant 2016 due to alcohol abuse, pAF on chronic anticoagulation, history of diabetes mellitus type 2, CVA, hypertension, CHRISTIAN on BiPAP.  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  He was recovering in [facility] and was transferred to [facility] for consideration of chest tube placement and possible intrapleural lysis for worsening effusion.    Acute metabolic encephalopathy with delirium, multifactorial  Hepatic encephalopathy  Chronic liver disease, history of liver transplant 2016  End-stage renal disease (ESRD), on hemodialysis (HD)  History of seizure, on Keppra and Vimpat  Waxing and waning mentation, coinciding with lactulose being held at [facility]  Earlier in hospital stay, remained confused and had pulled out tracheostomy tube, PICC line and pulled his dialysis catheter.  History of hepatic encephalopathy and liver transplant.  Palliative care consult 1/26, see note.  * Psychiatry consulted 2/2, see note, follow-up requested and pending.    On/off fluctuating mentation, recently improving 2/17 to 2/19    Continue to reorient as needed; maintain normal day/night, sleep wake cycles; minimize sedating/altering medications as able    Continue Lactulose to 10 mg BID, monitor for symptoms    Zyprexa 5 mg at bedtime scheduled.  Melatonin 3 mg p.o. at bedtime scheduled    Olanzapine BID as needed for severe agitation or anxiety; monitor for side effets    Mittens/sitter as needed; minimize use of  restraints as able    Nephrology consult follow-up; ongoing hemodialysis, as per nephrology, on M-W-F dialysis schedule    Dialysis labs as per nephrology    Vitamin D level ordered per family request; nephrology managing and treating    Psychiatry consult follow-up requested, last visit 2/2, help appreciated; overall, mental status improved over past several days     Bilateral pleural effusions   Acute respiratory failure requiring tracheostomy  - resolved    Pneumonia  - resolved   ARDS  - resolved    Right pneumothorax - resolved   *Initial event was parainfluenza and strep pneumo pneumonia back in November 2022. Treated for ARDS. Had failed extubation x2 and tracheostomy performed on previous hospitalization. *Had additional complications of bilateral pneumothoraces as well as hydropneumothorax- pleural fluid grew candida parapsilosis  *Completed 4-week antifungal treatment. [facility]  *Recently there were concern for worsening effusion while at [facility] and had thoracentesis 01/13 which returned exudative without growth on cultures. CT chest/abdomen/pelvis on 01/18 demonstrated worsening effusions and concerns for infected parapneumonic effusions with possible SBP.  Multiple pulmonologist at [facility] reviewed CT scan recommended transfer to [facility] for consideration of chest tube placement and possible intrapleural lysis  *Thoracic surgery (Dr. Jackson) consulted during admission   *CT chest 1/22, small effusions on imaging and so chest tube was not inserted.   ID consulted, see note 2/10.  *Patient pulled out his tracheostomy tube on the night 2/1-2/2 and is tolerating well without increased shortness of breath persistent cough or worsening hypoxia  * Chest x-ray 2/3 with cardiomegaly, chronic small bilateral pleural effusions, no pulmonary edema; right internal jugular dialysis catheter in place    Monitor respiratory status, recently stable on room  air    Encourage incentive spirometry as able    Wound care previously consulted for tracheostomy site care, monitor for signs and symptoms of infection; stable     S/p liver transplant 2016   Chronic immunosuppression, on tacrolimus  Cirrhosis with ascites  Subacute bacterial peritonitis (SBP), resolved  *Main manifestations of this are hepatic encephalopathy and ascites.  Hepatologist at [facility] felt that most likely reason for the cirrhosis was metabolic syndrome or alcohol intake.  There was no evidence of rejection on biopsy and there was some evidence of regeneration. Paracentesis done on 12/22/2022 showed lactobacillus indicating SBP, treated with Unasyn and Augmentin, finished 2-week course 1/12/2023.  Requires large-volume paracentesis periodically for recurring ascites.    *Paracentesis 1/13/2023 yielded about 7500 cc. Cultures NGTD.  Most recent paracentesis on 1/24 with 4.8 L fluid removal    Continue intermittent paracentesis as needed if develops symptomatic ascites    Monitor for signs and symptoms of recurrent spontaneous bacterial peritonitis (SBP)    Further treatment for recurrent ascites with TIPS deferred to his Liver Transplant team and/or Hepatology, he has an appointment on 4/21/2023 with Hepatology, Dr. Simms    Continue Tacrolimus 1 mg BID.    Continue rifaximin 550 mg BID    Continue lactulose as ordered, titrate as needed    GI consult as needed in hospital for new acute issues, otherwise as outpatient     Diarrhea, improved, on lactulose for chronic liver disease  - C difficile negative on 1/31. Secondary to lactulose.  - discontinued rectal tube (2/12) as stools more formed    Continue lactulose with goal of 2 to 3 soft bowel movement in 24 hours     Paroxysmal atrial fibrillation  Chronic anticoagulation, apixaban  Remains in sinus rhythm, on anticoagulation with apixaban indefinitely for stroke prophylaxis.      Monitor rhythm    Continue apixaban anticoagulation    Monitor  hemoglobin, see below     Anemia, acute on chronic  Has required intermittent transfusions for on-going anemia.  Anemia most likely secondary to critical illness/chronic disease, chronic renal disease, rule out acute blood loss.  Recently macrocytic.  Baseline hemoglobin around 7-8    Transfuse packed red blood cells to keep hemoglobin > 7 or as per nephrology    Hemoglobin 8.1 on 2/17, monitor    Epoetin lfa-epbx use as per nephrology     History PEA arrest   PEA arrest prior to his previous admission and 1 episode of PEA associated with desaturation on 12/20.  No structural cardiac disease.     Stable, continue cardiac Monitoring     Chronic Hypotension  In the past has developed baseline hypotension with cirrhosis and prolonged critical illness.  On hemodialysis.    Continue midodrine, monitor; as per nephrology      History of seizure   After hypoxic event, in the context of baseline multifactorial encephalopathy secondary to his ongoing critical illness and prior cardiac arrests and prior strokes and cirrhosis with hepatic encephalopathy. MRI of head of 12/20/22 reveals no PRES or leptomeningeal enhancement but scattered.  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   No recent seizure activity.    Continue levetiracetam, lacosamide; recent questions if medications are needed long-term, defer to neurology    Seizure precautions    Outpatient follow-up with neurology, reconsult inpatient if needed      Diabetes mellitus type 2  *Hemoglobin A1c on November 2022 was 4.7  *By report, on Lantus insulin in the past.  Blood sugar checks and sliding scale insulin previously discontinued as has been stable without insulin needs    Monitor with periodic blood sugar checks as needed     Severe malnutrition, protein and calorie type  Moderate dysphagia  G-tube feedings    Continue with tube feeds via PEG tube; recent oral intake improving    IR replaced the PEG tube on  2/8/2023, monitor    Nutritionist consulted and following for tube feeds    SLP following as well    Oral diet as per speech and language therapy (SLP)      Anxiety  Fluoxetine was discontinued as per psychiatry recommendation on 2/2 (see consult note for details)     Stable, monitor; comfort and reassurance offered during visit    Psychiatry follow-up requested    Disposition     Estimated length of stay several days    Anticipated discharge to transitional care unit (TCU), long-term care, or LTACH; social work consulted, help appreciated; previously at [facility] and by report not able to return there (note 2/10)    Patient's wife, Ofe Osborne, called and updated 2/17, she was appreciative of call    Patient's brother, Dylna Osborne, also updated at the bedside 2/18    Change in hospitalist provider tomorrow with one of my [facility] hospitalist colleagues assuming care.       Diet: Room Service  Adult Formula Bolus Feeding: Novasource Renal; Route: Gastrostomy; 3 times daily; Volume per Bolus: 240; mL(s); 80 mL/hr x 3 hrs back up bolus feeding after each meal ONLY IF INTAKE <50% of meal  Snacks/Supplements Adult: Other; 4 oz Ensure with breakfast, Gelatein+ with lunch and magic cup with dinner (RD); With Meals  Soft & Bite Sized Diet (level 6) Liquidized/Moderately Thick (level 3) (By tsp only); No Straws    DVT Prophylaxis: DOAC  Nixon Catheter: Not present  Lines: PRESENT      CVC Double Lumen Right External jugular Tunneled-Site Assessment: WDL      Cardiac Monitoring: None  Code Status: Full Code      Clinically Significant Risk Factors              # Hypoalbuminemia: Lowest albumin = 1.9 g/dL at 1/23/2023  6:38 AM, will monitor as appropriate            # Severe Malnutrition: based on nutrition assessment        Disposition Plan      Expected Discharge Date: 02/21/2023, 12:00 PM    Destination: inpatient rehabilitation facility  Discharge Comments: admitted on 1-21 from [facility], Dialysis  "pt MWF. Sitter for restlessness. Will need placement          Renny Erwin MD  Hospitalist Service  [facility]  Securely message with [employer] (more info)  Text page via [facility] Paging/Directory   ______________________________________________________________________    Interval History   Lying in bed, appearing comfortable, overall feels \"exhausted\".  No report of pain.  No new complaints.    Physical Exam   Vital Signs: Temp: 97.2  F (36.2  C) Temp src: Oral BP: 130/55 Pulse: 83   Resp: 16 SpO2: 92 % O2 Device: None (Room air)    Weight: 193 lbs 1.97 oz    Lying in bed, resting comfortably, in no immediate distress, feeling fatigued overall  Mild to moderate inspiratory effort without wheezes or crackles on lung exam  Heart S1-S2 with regular rhythm, no rubs or gallops  Abdomen soft, nontender without rebound or rigidity; PEG tube site intact and bandaged  Extremities with no significant edema  Skin warm and dry, dialysis catheter right upper chest intact  Mental status quiet, reserved, napping easily, feeling fatigued    Medical Decision Making             Data         Imaging results reviewed over the past 24 hrs:   No results found for this or any previous visit (from the past 24 hour(s)).   Creatinine   Date Value Ref Range Status   02/17/2023 3.92 (H) 0.67 - 1.17 mg/dL Final   04/20/2020 1.06 0.66 - 1.25 mg/dL Final     Recent Labs   Lab 02/17/23  0808 02/13/23  0850   HGB 8.1* 7.7*     "

## 2023-02-19 NOTE — PROGRESS NOTES
Goal Outcome Evaluation:        DATE & TIME: 2/19/23 6652-7936  Cognitive Concerns/ Orientation : Pt A/Ox2 disorientated to place and situation  BEHAVIOR & AGGRESSION TOOL COLOR: Green             ABNL VS/O2: VSS on RA  MOBILITY: Lift, fall risk. Reposition q2h pt sometimes repositions himself. Up in chair for breakfast.  PAIN MANAGMENT: Did not request pain meds.  DIET: Soft, bite sized with moderately thick liquids. Feeds himself- fair appetite. No boluses given.  BOWEL/BLADDER: Incontinent of bowel and bladder. Dialysis patient. BM x2 this shift, - lactulose held since last evening due to x4 BM. DRAIN/DEVICES: PIV SL. Right external jugular tunneled CVC. PEG tube with abdominal binder.  SKIN: Scattered scabs and bruises. Blanchable redness to coccyx and elbows. Mepilex on bilateral elbows.   D/C DATE: Discharge pending progress  OTHER IMPORTANT INFO: Intermittently restless and attempting to get out of bed at times, long arm worked well with this patient this shift.

## 2023-02-20 LAB
ANION GAP SERPL CALCULATED.3IONS-SCNC: 10 MMOL/L (ref 7–15)
BUN SERPL-MCNC: 55 MG/DL (ref 6–20)
CALCIUM SERPL-MCNC: 9 MG/DL (ref 8.6–10)
CHLORIDE SERPL-SCNC: 95 MMOL/L (ref 98–107)
CREAT SERPL-MCNC: 4.58 MG/DL (ref 0.67–1.17)
DEPRECATED CALCIDIOL+CALCIFEROL SERPL-MC: <42 UG/L (ref 20–75)
DEPRECATED HCO3 PLAS-SCNC: 29 MMOL/L (ref 22–29)
GFR SERPL CREATININE-BSD FRML MDRD: 14 ML/MIN/1.73M2
GLUCOSE SERPL-MCNC: 90 MG/DL (ref 70–99)
MAGNESIUM SERPL-MCNC: 2 MG/DL (ref 1.7–2.3)
PHOSPHATE SERPL-MCNC: 6.7 MG/DL (ref 2.5–4.5)
POTASSIUM SERPL-SCNC: 5 MMOL/L (ref 3.4–5.3)
SODIUM SERPL-SCNC: 134 MMOL/L (ref 136–145)
VITAMIN D2 SERPL-MCNC: <5 UG/L
VITAMIN D3 SERPL-MCNC: 37 UG/L

## 2023-02-20 PROCEDURE — 634N000001 HC RX 634: Performed by: INTERNAL MEDICINE

## 2023-02-20 PROCEDURE — 250N000013 HC RX MED GY IP 250 OP 250 PS 637: Performed by: STUDENT IN AN ORGANIZED HEALTH CARE EDUCATION/TRAINING PROGRAM

## 2023-02-20 PROCEDURE — 99232 SBSQ HOSP IP/OBS MODERATE 35: CPT | Performed by: STUDENT IN AN ORGANIZED HEALTH CARE EDUCATION/TRAINING PROGRAM

## 2023-02-20 PROCEDURE — 250N000013 HC RX MED GY IP 250 OP 250 PS 637: Performed by: INTERNAL MEDICINE

## 2023-02-20 PROCEDURE — 90935 HEMODIALYSIS ONE EVALUATION: CPT | Performed by: INTERNAL MEDICINE

## 2023-02-20 PROCEDURE — 258N000003 HC RX IP 258 OP 636: Performed by: INTERNAL MEDICINE

## 2023-02-20 PROCEDURE — 36415 COLL VENOUS BLD VENIPUNCTURE: CPT | Performed by: STUDENT IN AN ORGANIZED HEALTH CARE EDUCATION/TRAINING PROGRAM

## 2023-02-20 PROCEDURE — 250N000011 HC RX IP 250 OP 636: Performed by: INTERNAL MEDICINE

## 2023-02-20 PROCEDURE — 250N000012 HC RX MED GY IP 250 OP 636 PS 637: Performed by: STUDENT IN AN ORGANIZED HEALTH CARE EDUCATION/TRAINING PROGRAM

## 2023-02-20 PROCEDURE — 82310 ASSAY OF CALCIUM: CPT | Performed by: STUDENT IN AN ORGANIZED HEALTH CARE EDUCATION/TRAINING PROGRAM

## 2023-02-20 PROCEDURE — 84100 ASSAY OF PHOSPHORUS: CPT | Performed by: STUDENT IN AN ORGANIZED HEALTH CARE EDUCATION/TRAINING PROGRAM

## 2023-02-20 PROCEDURE — 250N000009 HC RX 250: Performed by: STUDENT IN AN ORGANIZED HEALTH CARE EDUCATION/TRAINING PROGRAM

## 2023-02-20 PROCEDURE — 120N000001 HC R&B MED SURG/OB

## 2023-02-20 PROCEDURE — 83735 ASSAY OF MAGNESIUM: CPT | Performed by: STUDENT IN AN ORGANIZED HEALTH CARE EDUCATION/TRAINING PROGRAM

## 2023-02-20 PROCEDURE — 90937 HEMODIALYSIS REPEATED EVAL: CPT

## 2023-02-20 PROCEDURE — 250N000013 HC RX MED GY IP 250 OP 250 PS 637

## 2023-02-20 PROCEDURE — 250N000013 HC RX MED GY IP 250 OP 250 PS 637: Performed by: HOSPITALIST

## 2023-02-20 RX ORDER — ALBUMIN (HUMAN) 12.5 G/50ML
50 SOLUTION INTRAVENOUS
Status: DISCONTINUED | OUTPATIENT
Start: 2023-02-20 | End: 2023-02-20

## 2023-02-20 RX ORDER — CHOLECALCIFEROL (VITAMIN D3) 50 MCG
50 TABLET ORAL DAILY
Status: DISCONTINUED | OUTPATIENT
Start: 2023-02-20 | End: 2023-03-08

## 2023-02-20 RX ADMIN — APIXABAN 5 MG: 5 TABLET, FILM COATED ORAL at 08:02

## 2023-02-20 RX ADMIN — LACOSAMIDE 100 MG: 10 SOLUTION ORAL at 08:03

## 2023-02-20 RX ADMIN — LEVETIRACETAM 1000 MG: 100 SOLUTION ORAL at 19:52

## 2023-02-20 RX ADMIN — Medication 40 MG: at 16:07

## 2023-02-20 RX ADMIN — TACROLIMUS 1 MG: 5 CAPSULE ORAL at 00:37

## 2023-02-20 RX ADMIN — LACOSAMIDE 100 MG: 10 SOLUTION ORAL at 23:50

## 2023-02-20 RX ADMIN — Medication 50 MCG: at 14:39

## 2023-02-20 RX ADMIN — RIFAXIMIN 550 MG: 550 TABLET ORAL at 08:02

## 2023-02-20 RX ADMIN — MIDODRINE HYDROCHLORIDE 10 MG: 5 TABLET ORAL at 17:27

## 2023-02-20 RX ADMIN — NYSTATIN 500000 UNITS: 100000 SUSPENSION ORAL at 14:39

## 2023-02-20 RX ADMIN — HEPARIN SODIUM 1900 UNITS: 1000 INJECTION INTRAVENOUS; SUBCUTANEOUS at 12:00

## 2023-02-20 RX ADMIN — TACROLIMUS 1 MG: 5 CAPSULE ORAL at 23:50

## 2023-02-20 RX ADMIN — MIDODRINE HYDROCHLORIDE 10 MG: 5 TABLET ORAL at 08:02

## 2023-02-20 RX ADMIN — TACROLIMUS 1 MG: 5 CAPSULE ORAL at 08:07

## 2023-02-20 RX ADMIN — NYSTATIN 500000 UNITS: 100000 SUSPENSION ORAL at 17:27

## 2023-02-20 RX ADMIN — Medication 2.5 MG: at 00:45

## 2023-02-20 RX ADMIN — LACTULOSE 10 G: 10 SOLUTION ORAL at 08:18

## 2023-02-20 RX ADMIN — IRON SUCROSE 100 MG: 20 INJECTION, SOLUTION INTRAVENOUS at 12:07

## 2023-02-20 RX ADMIN — FOLIC ACID 1 MG: 1 TABLET ORAL at 08:02

## 2023-02-20 RX ADMIN — RIFAXIMIN 550 MG: 550 TABLET ORAL at 19:50

## 2023-02-20 RX ADMIN — EPOETIN ALFA-EPBX 20000 UNITS: 10000 INJECTION, SOLUTION INTRAVENOUS; SUBCUTANEOUS at 11:57

## 2023-02-20 RX ADMIN — SODIUM CHLORIDE 300 ML: 9 INJECTION, SOLUTION INTRAVENOUS at 08:00

## 2023-02-20 RX ADMIN — HEPARIN SODIUM 1900 UNITS: 1000 INJECTION INTRAVENOUS; SUBCUTANEOUS at 11:59

## 2023-02-20 RX ADMIN — MELATONIN TAB 3 MG 3 MG: 3 TAB at 19:49

## 2023-02-20 RX ADMIN — LIDOCAINE 1 PATCH: 560 PATCH PERCUTANEOUS; TOPICAL; TRANSDERMAL at 08:02

## 2023-02-20 RX ADMIN — NYSTATIN 500000 UNITS: 100000 SUSPENSION ORAL at 08:02

## 2023-02-20 RX ADMIN — LACOSAMIDE 100 MG: 10 SOLUTION ORAL at 00:37

## 2023-02-20 RX ADMIN — LACTULOSE 10 G: 10 SOLUTION ORAL at 19:49

## 2023-02-20 RX ADMIN — Medication 40 MG: at 08:07

## 2023-02-20 RX ADMIN — Medication 2.5 MG: at 08:20

## 2023-02-20 RX ADMIN — OLANZAPINE 5 MG: 5 TABLET, ORALLY DISINTEGRATING ORAL at 19:52

## 2023-02-20 RX ADMIN — APIXABAN 5 MG: 5 TABLET, FILM COATED ORAL at 19:50

## 2023-02-20 RX ADMIN — MIDODRINE HYDROCHLORIDE 10 MG: 5 TABLET ORAL at 14:39

## 2023-02-20 RX ADMIN — ACETAMINOPHEN 650 MG: 325 TABLET, FILM COATED ORAL at 19:31

## 2023-02-20 ASSESSMENT — ACTIVITIES OF DAILY LIVING (ADL)
ADLS_ACUITY_SCORE: 65
ADLS_ACUITY_SCORE: 65
ADLS_ACUITY_SCORE: 64
ADLS_ACUITY_SCORE: 65
ADLS_ACUITY_SCORE: 64
ADLS_ACUITY_SCORE: 66
ADLS_ACUITY_SCORE: 64
ADLS_ACUITY_SCORE: 66
ADLS_ACUITY_SCORE: 66

## 2023-02-20 NOTE — PROGRESS NOTES
Potassium   Date Value Ref Range Status   02/20/2023 5.0 3.4 - 5.3 mmol/L Final   12/29/2022 3.4 3.4 - 5.3 mmol/L Final   04/20/2020 3.9 3.4 - 5.3 mmol/L Final     Potassium POCT   Date Value Ref Range Status   01/19/2023 3.6 3.5 - 5.0 mmol/L Final     Hemoglobin   Date Value Ref Range Status   02/17/2023 8.1 (L) 13.3 - 17.7 g/dL Final   04/20/2020 14.3 13.3 - 17.7 g/dL Final     Creatinine   Date Value Ref Range Status   02/20/2023 4.58 (H) 0.67 - 1.17 mg/dL Final   04/20/2020 1.06 0.66 - 1.25 mg/dL Final     Urea Nitrogen   Date Value Ref Range Status   02/20/2023 55.0 (H) 6.0 - 20.0 mg/dL Final   12/29/2022 46 (H) 7 - 30 mg/dL Final   04/20/2020 23 7 - 30 mg/dL Final     Sodium   Date Value Ref Range Status   02/20/2023 134 (L) 136 - 145 mmol/L Final   04/20/2020 139 133 - 144 mmol/L Final     INR   Date Value Ref Range Status   12/21/2022 1.36 (H) 0.85 - 1.15 Final   10/07/2019 1.20 (H) 0.86 - 1.14 Final       DIALYSIS PROCEDURE NOTE  Hepatitis status of previous patient on machine log was checked and verified ok to use with this patients hepatitis status.   Latest Reference Range & Units 02/02/23 06:18   Hep B Surface Agn Nonreactive  Nonreactive   Hepatitis B Core Angela Nonreactive  Nonreactive   Hepatitis B Surface Antibody Instrument Value <8.00 m[IU]/mL 0.54   Hepatitis B Surface Antibody  Nonreactive     Patient dialyzed for 3 hrs. on a K2 bath with a net fluid removal of  2L.  A BFR of 400 ml/min was obtained via a RCVC.      The treatment plan was discussed with Dr. Moulton during the treatment.    Total heparin received during the treatment: 0 units.    Line flushed, clamped and capped with heparin 1:1000 1.9 mL (1900 units) per lumen    Meds  given: epogen 20,000 units, venofer 100mg   Complications: none      Person educated: Patient. Knowledge base Minimal. Barriers to learning: disorientation. Educated on procedure via verbal mode. Patient verbalized understanding. Pt prefers oral education style.      ICEBOAT? Timeout performed pre-treatment  I: Patient was identified using 2 identifiers  C:  Consent Signed Yes  E: Equipment preventative maintenance is current and dialysis delivery system OK to use  B: Hepatitis B Status: see above  O: Dialysis orders present and complete prior to treatment  A: Vascular access verified and assessed prior to treatment  T: Treatment was performed at a clinically appropriate time  ?: Patient was allowed to ask questions and address concerns prior to treatment  See Adult Hemodialysis flowsheet in [facility] for further details and post assessment.  Machine water alarm in place and functioning. Transducer pods intact and checked every 15min.   Pt returned via bed to 632.  Chlorine/Chloramine water system checked every 4 hours.  Dialysis qMWF.

## 2023-02-20 NOTE — PLAN OF CARE
Goal Outcome Evaluation:    Pt here with ESRD. A&O to self and place. Redness blanchable on buttocks, WOC following. G tube patent. Right external jugular IV, as well as R PIV S/L. VSS on RA. Soft diet. Takes pills whole. Up with lift. Pt has not voided this shift, bladder scanned at 429 mL. MD notified and order for straight cath put in. After reviewing with the charge nurse, we are going to hold off as this is most probable not urine, but acsities. Can reassess tomorrow if think he is retaining urine. Denies pain. Plan pending, SW following.

## 2023-02-20 NOTE — PLAN OF CARE
Goal Outcome Evaluation:       DATE & TIME: 2/19/23 0483-1507  Cognitive Concerns/ Orientation : Pt A/Ox2 disorientated to place and situation  BEHAVIOR & AGGRESSION TOOL COLOR: Green   ABNL VS/O2: VSS on RA  MOBILITY: Lift, fall risk. Reposition q2h pt sometimes repositions himself.  PAIN MANAGMENT: c/o back pain- rating 8/10,PRN oxy given  x1  DIET: Soft, bite sized with moderately thick liquids. Feeds himself- fair appetite. No boluses given yesterday.  BOWEL/BLADDER: Incontinent of bowel and bladder. Dialysis patient. Large BM x2 this shift  DRAIN/DEVICES: PIV SL. Right external jugular tunneled CVC. PEG tube with abdominal binder.  SKIN: Scattered scabs and bruises. Blanchable redness to coccyx and elbows. Mepilex on bilateral elbows.   D/C DATE: Discharge pending progress  OTHER IMPORTANT INFO: pt is calm and cooperative this shift, calls appropriately, uses bedpan at times when he feels he is tired and unable to gets up, large BM x1 this shift

## 2023-02-20 NOTE — PLAN OF CARE
Goal Outcome Evaluation:      Plan of Care Reviewed With: patient, family    Overall Patient Progress: no change    Outcome Evaluation: No documented bolus feedings for the past 3 days. Pt takes variable % meals 2-3x/day. Family wishes for at least 1 scheduled bolus feeding/day and continuation of back up boluses as needed. Daily at 6pm - please provide TF bolus after dinner 80 mL/hr x3 hrs. Provide additional back up bolus at same rate after breakfast and lunch meal if consumes <50% of that meal

## 2023-02-20 NOTE — PROGRESS NOTES
CLINICAL NUTRITION SERVICES - REASSESSMENT NOTE      Recommendations Ordered by Registered Dietitian (RD):   Daily at 6pm - please provide TF bolus after dinner at 80 mL/hr x3 hrs  Provide additional back up bolus at same rate after breakfast and lunch meal if consumes <50% of that meal    Discontinue Ensure as does not like this    Malnutrition: (1/22)  % Weight Loss:  > 5% in 1 month (severe malnutrition)  % Intake:  </= 50% for >/= 5 days (severe malnutrition)- suspected   Subcutaneous Fat Loss:  Orbital region moderate depletion, Upper arm region moderate-severe depletion and Thoracic region moderate-severe depletion  Muscle Loss:  Temporal region moderate depletion, Clavicle bone region severe depletion, Acromion bone region severe depletion, Patellar region moderate depletion and Anterior thigh region moderate-severe depletion  Fluid Retention:  Trace     Malnutrition Diagnosis: Severe malnutrition  In Context of:  Acute on Chronic illness or disease       EVALUATION OF PROGRESS TOWARD GOALS   Diet:  Soft & Bite Sized  Liquidized/moderately thick liquids    Supplements:   4 oz Ensure w/ Breakfast  Gelatein w/ Lunch  Magic Cup w/ dinner    Nutrition Support:    Type of Feeding Tube: G-tube  Enteral Frequency:  Continuous  Enteral Regimen: Novasource Renal at 80 mL/hr x 3 hours TID --> IF consumes < 50% of meal  Per Bolus Provisions: 240 mL formula = 480 kcal, 22 g protein, 44 g CHO, 0 g fiber and 172 mL free water  Free Water Flush: 100 mL before and after each feeding    Intake/Tolerance:  Met with patient and family today   No bolus feedings given per RN notes the past 3 days. He is documented to be consuming between % of his meals   Family expresses frustration about his nutrition and wants G-tube to be utilized more. She states that breakfast is his best meal, lunch is variable but generally doesn't eat much for dinner. On dialysis days he misses breakfast which is of concern for family  We discussed  scheduling at least 1 bolus feeding daily and continue back up boluses for the other 2 meals if consumes <50% of that meal     Pt also noted to not be taking the Ensure but does like and consume the Magic cup and cherry Gelatein     Labs reviewed: Na 134 (L), K 5, Mg 2, Phos 6.7 (H), BUN 55 (H),Cr 4.58 (H)  Recent Labs   Lab 02/20/23  0801 02/17/23  0808   GLC 90 96     Medications reviewed: lactulose BID  Stooling:  - 2/17: BM x2  - 2/18: BM x7  - 2/19: BM x3  Wt: 193 lbs 1.97 oz  Vitals:    01/30/23 0500 01/31/23 0510 02/10/23 0425 02/11/23 0339   Weight: 86.4 kg (190 lb 7.6 oz) 84.5 kg (186 lb 4.6 oz) 84.6 kg (186 lb 8.2 oz) 86 kg (189 lb 9.5 oz)    02/17/23 0500   Weight: 87.6 kg (193 lb 2 oz)         ASSESSED NUTRITION NEEDS:  Dosing Weight 82.4 kg   Estimated Energy Needs: 4770-7264 kcals (30-35 Kcal/Kg)  Justification: repletion and HD  Estimated Protein Needs: + grams protein (1.2-1.5+ g pro/Kg)  Justification: Repletion and dialysis      NEW FINDINGS:   Chart reviewed  HD today  Nephrologist added Vit D daily 2000 units     Previous Goals:   Consumption of at least 50% of meal vs replace with bolus feeds.   Evaluation: Unable to evaluate, suspect no met on average     Previous Nutrition Diagnosis:   Inadequate oral intake related to reliance on TF for make-up volumes with as evidenced by patient has been receiving 1-2 bolus feeds daily, with intakes between 25-75% of most meals the past week.   Evaluation: No change      CURRENT NUTRITION DIAGNOSIS  Inadequate protein-energy intake related to variable PO and TF boluses provided as evidenced by meals ranging from % and no bolus documented for 3 days     INTERVENTIONS  Recommendations / Nutrition Prescription  Daily at 6pm - please provide TF bolus after dinner 80 mL/hr x3 hrs  Provide additional back up bolus at same rate after breakfast and lunch meal if consumes <50% of that meal    Implementation  EN Schedule: as above    Goals  Patient will  consume >50% meals 2-3x/day and receive at least 1 bolus feeding/day       MONITORING AND EVALUATION:  Progress towards goals will be monitored and evaluated per protocol and Practice Guidelines      Deann Rainey RD, LD  Clinical Dietitian

## 2023-02-20 NOTE — PROGRESS NOTES
Inpatient Dialysis Progress Note            Assessment and Plan:       Following ARF without recovery/current dialysis dependence         1) End Stage Kidney Disease (CKD and subsequent ATN with non-recovery)  Initially LORETTA, but no renal recovery (anuric/oliguric), now considered ESRD. 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  PTH is below target.  Hard to get this to increase.  Vit D also below target.  I will add some Vit D.        HD complicated with hypotension. On midodrine 10mg TID.     2) Anemia:  EPO 20 K units.   persistent anemia despite adequate iron stores and high epo dosing suggesting some epo resistance.      3) disposition:   not returning to LTACH. In case not able to go to inpatient rehab, [facility] and dialysis.      4) Diffuse pruritis  No hyperphosphatemia, though pruritis might be related to his renal disease. Trial urea cream     Other:  Hx liver transplant 2016  Confusion/encephlopathy  DM  AMS, delirium        Plan/Recs:  1) HD today, continue MWF schedule  2) Agree that In current state not outpatient dialysis candidate   3) Add vit D 2000 units daily.            Interval History:     Feeling OK.  Many questions about his kidney and liver function,  He still seems not to be fully processing information that I give him.  He is not SOB.        Dialysis Parameters:     Wt Readings from Last 4 Encounters:   02/17/23 87.6 kg (193 lb 2 oz)   01/21/23 82.4 kg (181 lb 11.2 oz)   12/28/22 96 kg (211 lb 10.3 oz)   12/16/22 100.2 kg (221 lb)     I/O last 3 completed shifts:  In: 240 [P.O.:240]  Out: -   BP Readings from Last 3 Encounters:   02/20/23 111/71   01/21/23 120/71   12/29/22 (!) 139/94       Routine, ONE TIME, Starting today For 1 Occurrences  Weight Loss (kg): 1-2  Dialysis Temp: 36.5  C  Access Device: CVC  Access Site: R IJ  Dialyzer: Revaclear  Dialysis Bath: K 2  Blood Flow Rate (mL/min): 350  Total  Treatment Time (hrs): 3  Heparin: no         Medications and Allergies:   Reviewed in EPIC      - MEDICATION INSTRUCTIONS for Dialysis Patients -   Does not apply See Admin Instructions     sodium chloride 0.9%  300 mL Hemodialysis Machine Once     albumin human  250 mL Intravenous Once in dialysis/CRRT     apixaban ANTICOAGULANT  5 mg Oral BID     epoetin mirta-epbx  20,000 Units Intravenous Once     folic acid  1 mg Oral or Feeding Tube Daily     sodium chloride (PF) 0.9%  10 mL Intracatheter Once in dialysis/CRRT    Followed by     heparin  1.3-2.6 mL Intracatheter Once in dialysis/CRRT     sodium chloride (PF) 0.9%  10 mL Intracatheter Once in dialysis/CRRT    Followed by     heparin  1.3-2.6 mL Intracatheter Once in dialysis/CRRT     iron sucrose  100 mg Intravenous Once in dialysis/CRRT     lacosamide  100 mg Oral or Feeding Tube BID     lactulose  10 g Oral BID     levETIRAcetam  1,000 mg Oral or Feeding Tube Q24H     lidocaine  1 patch Transdermal Q24H     lidocaine   Transdermal Q8H BIJU     melatonin  3 mg Oral At Bedtime     midodrine  10 mg Oral or Feeding Tube TID w/meals     mineral oil-hydrophilic petrolatum   Topical Daily     - MEDICATION INSTRUCTIONS -   Does not apply Once     nystatin  500,000 Units Swish & Spit 4x Daily     OLANZapine zydis  5 mg Oral At Bedtime     pantoprazole  40 mg Oral or Feeding Tube BID AC     rifaximin  550 mg Oral or Feeding Tube BID     sodium chloride (PF)  10-30 mL Intracatheter Q8H     sodium chloride (PF)  3 mL Intracatheter Q8H     tacrolimus  1 mg Oral BID IS     sodium chloride 0.9%, acetaminophen **OR** acetaminophen, acetylcysteine, albumin human, albumin human, albuterol, calcium carbonate, artificial tears, glucose **OR** dextrose **OR** glucagon, ipratropium - albuterol 0.5 mg/2.5 mg/3 mL, lidocaine 4%, lidocaine (buffered or not buffered), melatonin, mineral oil-hydrophilic petrolatum, naloxone **OR** naloxone **OR** naloxone **OR** naloxone, nitroGLYcerin,  OLANZapine zydis, ondansetron **OR** ondansetron, oxyCODONE, simethicone, sodium chloride (PF), urea   No Known Allergies           Labs:     BMP  Recent Labs   Lab 02/20/23  0801 02/17/23  0808   * 136   POTASSIUM 5.0 4.4   CHLORIDE 95* 94*   KELLY 9.0 9.3   CO2 29 32*   BUN 55.0* 46.6*   CR 4.58* 3.92*   GLC 90 96     CBC  Recent Labs   Lab 02/17/23  0808   WBC 6.1   HGB 8.1*   HCT 28.5*   *   *     Lab Results   Component Value Date    AST 37 02/17/2023    ALT 9 (L) 02/17/2023    ALKPHOS 216 (H) 02/17/2023    BILITOTAL 0.4 02/17/2023    CHANDLER 29 02/14/2023            Physical Exam:   Vitals were reviewed in [facility]    Wt Readings from Last 3 Encounters:   02/17/23 87.6 kg (193 lb 2 oz)   01/21/23 82.4 kg (181 lb 11.2 oz)   12/28/22 96 kg (211 lb 10.3 oz)       Intake/Output Summary (Last 24 hours) at 2/20/2023 1012  Last data filed at 2/19/2023 1850  Gross per 24 hour   Intake 240 ml   Output --   Net 240 ml       GENERAL APPEARANCE: pleasant, no distress, awake  HEENT:  Eyes/ears/nose grossly normal, neck supple  RESP: lungs clear to auscultation with good efforts, no crackles, rhonchi or wheezes  CV: regular rate and rhythm, normal S1 S2, no murmur, click or rub   ABDOMEN: soft, nontender, bowel sounds normal  EXTREMITIES/SKIN: no edema, no rashes or lesions     Pt seen on dialysis.  Stable run.  Good BFR.      Attestation:  I have reviewed today's vital signs, notes, medications, labs and imaging.     Kb Moulton MD  [facility] - Nephrology  [phone]

## 2023-02-21 ENCOUNTER — APPOINTMENT (OUTPATIENT)
Dept: SPEECH THERAPY | Facility: CLINIC | Age: 58
DRG: 981 | End: 2023-02-21
Attending: STUDENT IN AN ORGANIZED HEALTH CARE EDUCATION/TRAINING PROGRAM
Payer: COMMERCIAL

## 2023-02-21 ENCOUNTER — APPOINTMENT (OUTPATIENT)
Dept: PHYSICAL THERAPY | Facility: CLINIC | Age: 58
DRG: 981 | End: 2023-02-21
Attending: STUDENT IN AN ORGANIZED HEALTH CARE EDUCATION/TRAINING PROGRAM
Payer: COMMERCIAL

## 2023-02-21 LAB
ERYTHROCYTE [DISTWIDTH] IN BLOOD BY AUTOMATED COUNT: 18.6 % (ref 10–15)
GLUCOSE BLDC GLUCOMTR-MCNC: 85 MG/DL (ref 70–99)
HCT VFR BLD AUTO: 28.5 % (ref 40–53)
HGB BLD-MCNC: 8.5 G/DL (ref 13.3–17.7)
MCH RBC QN AUTO: 30 PG (ref 26.5–33)
MCHC RBC AUTO-ENTMCNC: 29.8 G/DL (ref 31.5–36.5)
MCV RBC AUTO: 101 FL (ref 78–100)
PLATELET # BLD AUTO: 129 10E3/UL (ref 150–450)
RBC # BLD AUTO: 2.83 10E6/UL (ref 4.4–5.9)
WBC # BLD AUTO: 4.6 10E3/UL (ref 4–11)

## 2023-02-21 PROCEDURE — 250N000013 HC RX MED GY IP 250 OP 250 PS 637: Performed by: INTERNAL MEDICINE

## 2023-02-21 PROCEDURE — 85027 COMPLETE CBC AUTOMATED: CPT | Performed by: STUDENT IN AN ORGANIZED HEALTH CARE EDUCATION/TRAINING PROGRAM

## 2023-02-21 PROCEDURE — 120N000001 HC R&B MED SURG/OB

## 2023-02-21 PROCEDURE — 250N000013 HC RX MED GY IP 250 OP 250 PS 637: Performed by: STUDENT IN AN ORGANIZED HEALTH CARE EDUCATION/TRAINING PROGRAM

## 2023-02-21 PROCEDURE — 250N000012 HC RX MED GY IP 250 OP 636 PS 637: Performed by: STUDENT IN AN ORGANIZED HEALTH CARE EDUCATION/TRAINING PROGRAM

## 2023-02-21 PROCEDURE — 92526 ORAL FUNCTION THERAPY: CPT | Mod: GN | Performed by: SPEECH-LANGUAGE PATHOLOGIST

## 2023-02-21 PROCEDURE — 97110 THERAPEUTIC EXERCISES: CPT | Mod: GP

## 2023-02-21 PROCEDURE — 36415 COLL VENOUS BLD VENIPUNCTURE: CPT | Performed by: STUDENT IN AN ORGANIZED HEALTH CARE EDUCATION/TRAINING PROGRAM

## 2023-02-21 PROCEDURE — 97530 THERAPEUTIC ACTIVITIES: CPT | Mod: GP

## 2023-02-21 PROCEDURE — 250N000013 HC RX MED GY IP 250 OP 250 PS 637

## 2023-02-21 PROCEDURE — 250N000009 HC RX 250: Performed by: STUDENT IN AN ORGANIZED HEALTH CARE EDUCATION/TRAINING PROGRAM

## 2023-02-21 PROCEDURE — 99232 SBSQ HOSP IP/OBS MODERATE 35: CPT | Performed by: STUDENT IN AN ORGANIZED HEALTH CARE EDUCATION/TRAINING PROGRAM

## 2023-02-21 PROCEDURE — 99232 SBSQ HOSP IP/OBS MODERATE 35: CPT | Performed by: PSYCHIATRY & NEUROLOGY

## 2023-02-21 PROCEDURE — 99231 SBSQ HOSP IP/OBS SF/LOW 25: CPT

## 2023-02-21 RX ORDER — RAMELTEON 8 MG/1
8 TABLET ORAL AT BEDTIME
Status: DISCONTINUED | OUTPATIENT
Start: 2023-02-21 | End: 2023-02-21

## 2023-02-21 RX ORDER — RAMELTEON 8 MG/1
8 TABLET ORAL EVERY EVENING
Status: DISCONTINUED | OUTPATIENT
Start: 2023-02-21 | End: 2023-04-28 | Stop reason: HOSPADM

## 2023-02-21 RX ADMIN — NYSTATIN 500000 UNITS: 100000 SUSPENSION ORAL at 23:20

## 2023-02-21 RX ADMIN — ACETAMINOPHEN 650 MG: 325 TABLET, FILM COATED ORAL at 20:23

## 2023-02-21 RX ADMIN — OLANZAPINE 5 MG: 5 TABLET, ORALLY DISINTEGRATING ORAL at 23:20

## 2023-02-21 RX ADMIN — LEVETIRACETAM 1000 MG: 100 SOLUTION ORAL at 23:20

## 2023-02-21 RX ADMIN — MIDODRINE HYDROCHLORIDE 10 MG: 5 TABLET ORAL at 17:07

## 2023-02-21 RX ADMIN — Medication 50 MCG: at 09:11

## 2023-02-21 RX ADMIN — RIFAXIMIN 550 MG: 550 TABLET ORAL at 09:11

## 2023-02-21 RX ADMIN — LACOSAMIDE 100 MG: 10 SOLUTION ORAL at 09:12

## 2023-02-21 RX ADMIN — NYSTATIN 500000 UNITS: 100000 SUSPENSION ORAL at 12:24

## 2023-02-21 RX ADMIN — RIFAXIMIN 550 MG: 550 TABLET ORAL at 20:24

## 2023-02-21 RX ADMIN — Medication 40 MG: at 09:12

## 2023-02-21 RX ADMIN — APIXABAN 5 MG: 5 TABLET, FILM COATED ORAL at 20:24

## 2023-02-21 RX ADMIN — MIDODRINE HYDROCHLORIDE 10 MG: 5 TABLET ORAL at 12:24

## 2023-02-21 RX ADMIN — NYSTATIN 500000 UNITS: 100000 SUSPENSION ORAL at 09:10

## 2023-02-21 RX ADMIN — APIXABAN 5 MG: 5 TABLET, FILM COATED ORAL at 09:10

## 2023-02-21 RX ADMIN — WHITE PETROLATUM: 1.75 OINTMENT TOPICAL at 09:14

## 2023-02-21 RX ADMIN — LACOSAMIDE 100 MG: 10 SOLUTION ORAL at 23:20

## 2023-02-21 RX ADMIN — MIDODRINE HYDROCHLORIDE 10 MG: 5 TABLET ORAL at 09:11

## 2023-02-21 RX ADMIN — RAMELTEON 8 MG: 8 TABLET, FILM COATED ORAL at 20:24

## 2023-02-21 RX ADMIN — Medication 40 MG: at 16:08

## 2023-02-21 RX ADMIN — TACROLIMUS 1 MG: 5 CAPSULE ORAL at 09:12

## 2023-02-21 RX ADMIN — FOLIC ACID 1 MG: 1 TABLET ORAL at 09:11

## 2023-02-21 RX ADMIN — NYSTATIN 500000 UNITS: 100000 SUSPENSION ORAL at 17:07

## 2023-02-21 ASSESSMENT — ACTIVITIES OF DAILY LIVING (ADL)
ADLS_ACUITY_SCORE: 65

## 2023-02-21 NOTE — PROGRESS NOTES
[facility]    Medicine Progress Note - Hospitalist Service    Date of Admission:  1/21/2023    Assessment & Plan   Blanco Osborne is a 57 year-old male admitted on 1/21/2023 as a transfer from [facility] for evaluation of loculated pleural effusion. He has extensive PMH including h/o liver transplant 2016 due to alcohol abuse, pAF on chronic anticoagulation, history of diabetes mellitus type 2, CVA, hypertension, CHRISTIAN on BiPAP.  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  He was recovering in [facility] and was transferred to [facility] for consideration of chest tube placement and possible intrapleural lysis for worsening effusion.    Acute metabolic encephalopathy with delirium, multifactorial  Hepatic encephalopathy  Chronic liver disease, history of liver transplant 2016  End-stage renal disease (ESRD), on hemodialysis (HD)  History of seizure, on Keppra and Vimpat  Waxing and waning mentation, coinciding with lactulose being held at [facility]  Earlier in hospital stay, remained confused and had pulled out tracheostomy tube, PICC line and pulled his dialysis catheter.  Lines replaced.  Palliative care consult 1/26, see note.  * Psychiatry consulted 2/2, see note, follow-up requested.    On/off fluctuating mentation, recently improving 2/17, 2/18    Continue to reorient as needed; maintain normal day/night, sleep wake cycles; minimize sedating/altering medications as able    Continue Lactulose to 10 mg BID, monitor for symptoms to have 2-3 Bms    Zyprexa 5 mg at bedtime scheduled.  Melatonin 3 mg p.o. at bedtime scheduled    Olanzapine BID as needed for severe agitation or anxiety; monitor for side effets    Mittens/sitter as needed; minimize use of restraints as able    Ongoing  hemodialysis, as per nephrology, on M-W-F dialysis schedule; ongoing nephrology consult follow-up appreciated    Dialysis labs as per nephrology    Vitamin D levelwas ordered per family request; nephrology managing and treating    Psychiatry consult follow-up requested, last visit 2/2, help appreciated    2/20/23Off restraints.Mentation has improved    2/21/23 -Fall this morning.Delirium Ramelteon started     Bilateral pleural effusions   Acute respiratory failure requiring tracheostomy  - resolved    Pneumonia  - resolved   ARDS  - resolved    Right pneumothorax - resolved   *Initial event was parainfluenza and strep pneumo pneumonia back in November 2022. Treated for ARDS. Had failed extubation x2 and tracheostomy performed on previous hospitalization. *Had additional complications of bilateral pneumothoraces as well as hydropneumothorax- pleural fluid grew candida parapsilosis  *Completed 4-week antifungal treatment. While in LTACH he was successfully weaned from the ventilator.  *Recently there were concern for worsening effusion while at [facility] and had thoracentesis 01/13 which returned exudative without growth on cultures. CT chest/abdomen/pelvis on 01/18 demonstrated worsening effusions and concerns for infected parapneumonic effusions with possible SBP.  Multiple pulmonologist at [facility] reviewed CT scan recommended transfer to [facility] for consideration of chest tube placement and possible intrapleural lysis  *Thoracic surgery (Dr. Jackson) consulted during admission   *CT chest 1/22, small effusions on imaging and so chest tube was not inserted.   ID consulted, see note 2/10.  *Patient pulled out his tracheostomy tube on the night 2/1-2/2 and is tolerating well without increased shortness of breath persistent cough or worsening hypoxia  * Chest x-ray 2/3 with cardiomegaly, chronic small bilateral pleural effusions, no pulmonary edema; right internal jugular dialysis catheter in place    Monitor respiratory  "status, recently stable on room air    Encourage incentive spirometry as able    Wound care previously consulted for tracheostomy site care, monitor for signs and symptoms of infection  2/20/23 Stable on RA     S/p liver transplant 2016   Chronic immunosuppression, on tacrolimus  Cirrhosis with ascites  Subacute bacterial peritonitis (SBP), resolved  *Main manifestations of this are hepatic encephalopathy and ascites.  Hepatologist at [place] felt that most likely reason for the cirrhosis was metabolic syndrome or alcohol intake.  There was no evidence of rejection on biopsy and there was some evidence of regeneration. Paracentesis done on 12/22/2022 showed lactobacillus indicating SBP, treated with Unasyn and Augmentin, finished 2-week course 1/12/2023.  Requires large-volume paracentesis periodically for recurring ascites.    *Paracentesis 1/13/2023 yielded about 7500 cc. Cultures NGTD.  Most recent paracentesis on 1/24 with 4.8 L fluid removal    Continue intermittent paracentesis as needed if develops symptomatic ascites    Monitor for signs and symptoms of recurrent spontaneous bacterial peritonitis     Further treatment for recurrent ascites with TIPS deferred to his Liver Transplant team and/or Hepatology, he has an appointment on 4/21/2023 with Hepatology, Dr. Simms    Continue Tacrolimus 1 mg BID.    Continue rifaximin 550 mg BID    Continue lactulose as ordered, titrate as needed to have 2-3 bms    GI consult as needed in hospital for new acute issues, otherwise as outpatient    Goals of care   As per prior rounding provider, \"on 1/25 nursing had mentioned that patient had refused to undergo dialysis and want to stop care, palliative care was consulted.  Patient and his spouse denied wanting to stop care and wanted ongoing restorative care including full code\"    Monitor, Full Code status     Diarrhea, improved, on lactulose for chronic liver disease  - C difficile negative on 1/31. Secondary to " lactulose.  - discontinued rectal tube (2/12) as stools more formed    Continue lactulose with goal of 2 to 3 soft bowel movement in 24 hours     Paroxysmal atrial fibrillation  Chronic anticoagulation, apixaban  Remains in sinus rhythm, on anticoagulation with apixaban indefinitely for stroke prophylaxis.      Monitor rhythm    Continue apixaban anticoagulation    Monitor hemoglobin, see below     Acute anemia  Pt has required intermittent transfusions for on-going anemia.  Anemia most likely secondary to critical illness/chronic disease, chronic renal disease.  Baseline hemoglobin around 7-8    Transfuse packed red blood cells to keep hemoglobin > 7    Hemoglobin 8.1 on 2/17    Epoetin lfa-epbx use as per nephrology    Hb 8.5 today     History PEA arrest   PEA arrest prior to his previous admission and 1 episode of PEA associated with desaturation on 12/20.  No structural cardiac disease.     Stable, continue cardiac Monitoring     Chronic Hypotension  In the past has developed baseline hypotension with cirrhosis and prolonged critical illness.  On hemodialysis.    Continue midodrine, as per nephrology      History of seizure   After hypoxic event, in the context of baseline multifactorial encephalopathy secondary to his ongoing critical illness and prior cardiac arrests and prior strokes and cirrhosis with hepatic encephalopathy. MRI of head of 12/20/22 reveals no PRES or leptomeningeal enhancement but scattered.  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   No recent seizure activity.    Continue levetiracetam, lacosamide     Seizure precautions    Outpatient follow-up with neurology, consult inpatient if needed      Diabetes mellitus type 2  *Hemoglobin A1c on November 2022 was 4.7  *By report, on Lantus insulin in the past.  Blood sugar checks and sliding scale insulin previously discontinued as has been stable without insulin needs    Monitor with periodic  blood sugar checks as needed     Severe malnutrition, protein and calorie type  Moderate dysphagia  G-tube feedings    Continue with tube feeds via PEG tube    IR replaced the PEG tube on 2/8/2023, monitor    Nutritionist consulted and following for tube feeds    SLP following as well    Oral diet as per speech and language therapy (SLP)     2/20 Nutrition following for tube feeds     Anxiety  Fluoxetine was discontinued as per psychiatry recommendation on 2/2 (see consult note for details)     Stable, monitor; comfort and reassurance offered during visit    Psychiatry follow-up     Disposition     Estimated length of stay several days    Anticipated discharge to transitional care unit (TCU), long-term care, or [facility]; social work consulted, help appreciated; previously at [facility] and by report not able to return there (note 2/10)     Diet: Room Service  Soft & Bite Sized Diet (level 6) Liquidized/Moderately Thick (level 3) (By tsp only); No Straws  Adult Formula Bolus Feeding: Novasource Renal; Route: Gastrostomy; 3 times daily; Volume per Bolus: 240; mL(s); EVERYDAY please bolus at 80 mL/hr x3 hrs at 6pm. Add additional bolus feedings x2 at 80 mL/hr x 3 hrs as back up bolus feeding after br...  Snacks/Supplements Adult: Other; Gelatein+ with lunch and magic cup with dinner (RD); With Meals    DVT Prophylaxis: DOAC  Nixon Catheter: Not present  Lines: PRESENT      CVC Double Lumen Right External jugular Tunneled-Site Assessment: WDL      Cardiac Monitoring: None  Code Status: Full Code      Clinically Significant Risk Factors              # Hypoalbuminemia: Lowest albumin = 1.9 g/dL at 1/23/2023  6:38 AM, will monitor as appropriate   # Thrombocytopenia: Lowest platelets = 129 in last 2 days, will monitor for bleeding          # Severe Malnutrition: based on nutrition assessment    Family Ofe and Brother Dylan updated on 2/21/23    Disposition Plan      Expected Discharge Date: 02/22/2023, 12:00 PM     Destination: inpatient rehabilitation facility  Discharge Comments: admitted on 1-21 from [place] [facility], Dialysis pt MWF. Will need placement          Ahskan Hernandez MD  Hospitalist Service  [facility]  Securely message with [employer] (more info)  Text page via [employer] Paging/Directory   ______________________________________________________________________    Interval History   Has been confused  Having 2-3 Bms   Had fall this morning   Physical Exam   Vital Signs: Temp: 98  F (36.7  C) Temp src: Oral BP: 100/68 Pulse: 88   Resp: 17 SpO2: 95 % O2 Device: None (Room air)    Weight: 193 lbs 1.97 oz    Physical Exam  Cardiovascular:      Rate and Rhythm: Normal rate and regular rhythm.      Heart sounds: Normal heart sounds.   Pulmonary:      Effort: Pulmonary effort is normal. No respiratory distress.   Abdominal:      General: There is distension.      Palpations: Abdomen is soft.   Musculoskeletal:      Right lower leg: No edema.      Left lower leg: No edema.   Neurological:      Mental Status: He is alert.       Medical Decision Making             Data     I have personally reviewed the following data over the past 24 hrs:    4.6  \   8.5 (L)   / 129 (L)     N/A N/A N/A /  85   N/A N/A N/A \       Imaging results reviewed over the past 24 hrs:   No results found for this or any previous visit (from the past 24 hour(s)).   Creatinine   Date Value Ref Range Status   02/20/2023 4.58 (H) 0.67 - 1.17 mg/dL Final   04/20/2020 1.06 0.66 - 1.25 mg/dL Final     Recent Labs   Lab 02/21/23  0851 02/17/23  0808   HGB 8.5* 8.1*

## 2023-02-21 NOTE — CODE/RAPID RESPONSE
"[facility]    Fall Note  2/21/2023   Time Called: 0731    Date of Admission:  1/21/2023  Code Status: Full Code      Summary:  I was called to evaluate Blanco Osborne, a 57 year old male following a fall. The patient is admitted for evaluation of loculated pleural effusion. PMH includes has a complex past medical history alcohol misuse, including liver transplant in 2016, paroxysmal A-fib on chronic anticoagulation, type 2 diabetes, CVA, hypertension, CHRISTIAN.  He was critically ill in November 2022 and has had transfers from LTAC back to hospital on and off since December 2022.     Patient reports he was getting out of bed to go to the bathroom independently when nursing staff walked by room, safety alarms going off, they found the patient to have been have slid part way out of bed.  Patient's knees slid to the ground making contact with soft floor mat however torso and upper extremities remained on the bed. Floor mat/pads in place to each side of bed.  Denies any preceding such as dizziness, chest palpitations, nausea, or pain.  Patient was assisted back into bed with 2 nursing staff members.     Assessment:  Unwitnessed mechanical fall secondary to acute hospital delirium   On my arrival patient is right side-lying, supine in hospital bed.  He is not in any acute distress.  His vital signs are stable.  He denies any head strike or preceding symptoms.  He reports to me he feels \"trapped\" by nursing staff and is very hungry telling me he does not cough and wants to eat.  It has been previously noted the patient's waxing and waning mentation.  Reorientation strategies were attempted.  Patient seems to be paranoid of nursing staff.  He tells me he has not slept in days.  He has not received any sedating medicine since in greater than 12 hours.  Temp: 98  F (36.7  C) Temp src: Oral BP: 100/68 Pulse: 88   Resp: 17 SpO2: 95 % O2 Device: None (Room air)  Blood glucose 85.     Plan:  --orthostatic " vitals next time out of bed  --encourge sleep wake cycles   --noted BID apixaban   --continue to provide frequent orientation   --attempted to update wife antonietta Thakur       Physical Exam  Vitals and nursing note reviewed.   Constitutional:       General: He is not in acute distress.  HENT:      Head: Normocephalic and atraumatic.      Mouth/Throat:      Mouth: Mucous membranes are moist.   Eyes:      Extraocular Movements: Extraocular movements intact.      Pupils: Pupils are equal, round, and reactive to light.   Cardiovascular:      Rate and Rhythm: Normal rate and regular rhythm.      Pulses: Normal pulses.      Heart sounds: No murmur heard.  Pulmonary:      Effort: Pulmonary effort is normal. No respiratory distress.       Abdominal:      General: There is no distension.      Palpations: Abdomen is soft.      Tenderness: There is no abdominal tenderness.      Comments: PEG tube intact under abdominal binder    Genitourinary:     Comments: incontinence brief in place   Musculoskeletal:         General: No swelling, tenderness or signs of injury. Normal range of motion.      Cervical back: Normal range of motion. No rigidity or tenderness.      Right lower leg: No edema.      Left lower leg: No edema.      Comments: BLE strength 5/5   Skin:     General: Skin is warm and dry.      Capillary Refill: Capillary refill takes less than 2 seconds.      Findings: Bruising present.   Neurological:      General: No focal deficit present.      Mental Status: He is alert. He is disoriented.   Psychiatric:         Thought Content: Thought content is paranoid.          Not all injuries from a fall are obvious on initial exam, please to not hesitate to call for reassessment by House provider should the patient's clinical status change, report new pain, or patient/nursing concern.         Rocio Fam NP  [facility]  Securely message with the Vocera Web Console (learn more here)  Text page via  [employer] Paging/Directory

## 2023-02-21 NOTE — PLAN OF CARE
Goal Outcome Evaluation:         DATE & TIME: 2/20/23 4942-1765  Cognitive Concerns/ Orientation : Pt A/Ox2 disorientated to place and situation  BEHAVIOR & AGGRESSION TOOL COLOR: Green   ABNL VS/O2: VSS on RA  MOBILITY: Lift, fall risk. Reposition q2h pt sometimes repositions himself.  PAIN MANAGMENT: Denies pain  DIET: Soft, bite sized with moderately thick liquids. Feeds himself- fair appetite. Boluses given yesterday evening.  BOWEL/BLADDER: Incontinent of bowel and bladder. Dialysis patient. Large BM x2 on previous shift, no BM this shift  DRAIN/DEVICES: PIV SL. Right external jugular tunneled CVC. PEG tube with abdominal binder.  SKIN: Scattered scabs and bruises. Blanchable redness to coccyx and elbows. Mepilex on bilateral elbows.   D/C DATE: Discharge pending progress  OTHER IMPORTANT INFO: Pt received HD 2L removed yesterday -, uses bedpan at times when he feels he is tired and unable to gets up, no BM this shift

## 2023-02-21 NOTE — PLAN OF CARE
Confused to situation, time. Easily frustrated with nursing staff and cares. VSS on RA. Up w/ Ax2, lift. Appetite is good, no TF bolus needed. Denied pain. Anuric. Had loose BMs today. Takes pills whole in apple sauce. Alarms on.   Plan to start 3 hr TF bolus at 1800.

## 2023-02-21 NOTE — PLAN OF CARE
Goal Outcome Evaluation:    DATE & TIME: 2/20/23 0800-4861  Cognitive Concerns/ Orientation : Pt A/Ox2 disorientated to place and situation  BEHAVIOR & AGGRESSION TOOL COLOR: Green   ABNL VS/O2: VSS on RA  MOBILITY: Lift, fall risk. Reposition q2h pt sometimes repositions himself.  PAIN MANAGMENT: c/o back pain- rating 8/10,PRN oxy given  x1  DIET: Soft, bite sized with moderately thick liquids. Feeds himself- fair appetite. No boluses given yesterday.  BOWEL/BLADDER: Incontinent of bowel and bladder. Dialysis patient. Large BM x2 this shift  DRAIN/DEVICES: PIV SL. Right external jugular tunneled CVC. PEG tube with abdominal binder.  SKIN: Scattered scabs and bruises. Blanchable redness to coccyx and elbows. Mepilex on bilateral elbows.   D/C DATE: Discharge pending progress  OTHER IMPORTANT INFO: Pt received HD 2L removed today -, uses bedpan at times when he feels he is tired and unable to gets up, 2 BM this shift

## 2023-02-21 NOTE — PROGRESS NOTES
Psychiatry progress note    Consult date: February 2, 2023         Reason for Consult, requesting source:    Delirium    Requesting source: Michael Chou    Labs and imaging reviewed. Patient seen and evaluated by Ofe Goetz MD          HPI:   This is a 57-year-old male whom we are asked to see to assist with delirium management.  Patient has been residing in an LTACH since he suffered {EA  arrest in November and was diagnosed with parainfluenza and strep pneumoniae as well as acute on chronic renal insufficiency.  He ended up having end-stage renal disease, and was also found to have cirrhosis of his transplanted liver which had been performed in 2016.  The patient presented for this hospitalization on 1/21/2023 as a transfer from [place] for evaluation of loculated pleural effusion.  At the LTAC, the patient had had worsening effusions, and had thoracentesis on 1/13/2023 which returned exudative without growth on cultures.  CT chest abdomen pelvis on 1/18/2023 showed worsening effusions and concerns for infected parapneumonic effusions with possible SBP.  The patient was transferred to [facility] for consideration of chest tube placement and possible intrapleural lysis.  The patient has been encephalopathic, and pulled out his tracheostomy tube last night in addition to his PICC line.    I did see the patient today along with his wife.  He was able to answer some questions, including confirming that he is having visual hallucinations.  He does not remember pulling out his trach last night.  At one point he fumbled with his PICC again, but responded to redirection from his wife.  I did talk to both his wife and his brother on the phone, both of them are quite involved.  Both confirm that the patient has been restless over the last several days, possibly coinciding with initiation of Prozac on 1/28/2023.  Patient has also recently been restarted on Vistaril low-dose for itching.  He was very pruritic  while I was talking to his wife.  She brushed him on the skin with a hairbrush where he indicated he was itching.  I discussed with both the patient's wife and his brother that antihistamines can cause people to hallucinate and experience agitation.  I recommend discontinuation of both the Vistaril and the Prozac.  I also told him I would come back by tomorrow and see how he is doing.    2/21/2023: Patient seen today, and chart reviewed.  Discussed with RN, who indicates the patient had a fall this morning that was unwitnessed.  Also, there is waxing and waning of mentation that continues.  He is out of restraints now, and has been eating some.  I did go into the patient's room today and tried to awaken him.  He was very somnolent.        Past Psychiatric History:   We did not discuss today due to the acuity of the situation, and care provider stress.        Substance Use and History:   Charting indicates patient has a history of alcohol use disorder, and may have developed liver issues related to alcohol use prior to transplant.        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     NAFLD (nonalcoholic fatty liver disease) 2/11/2016     CHRISTIAN on CPAP      Parainfluenza type 1 infection 11/19/2022     SBP (spontaneous bacterial peritonitis) (H)     [facility]     Sepsis due to Streptococcus pneumoniae with acute hypoxic respiratory failure (H) 11/19/2022     Tubular adenoma 10/2019    Large cecal adenoma- due for surveillance colonoscopy in 3 years (10/2022)       PAST SURGICAL HISTORY:   Past Surgical History:   Procedure Laterality Date     APPENDECTOMY       BENCH LIVER N/A 9/20/2016    Procedure: BENCH LIVER;  Surgeon:  Enoc Crews MD;  Location: [facility] OR     BRONCHOSCOPY FLEXIBLE AND RIGID N/A 2022    Procedure: BRONCHOSCOPY;  Surgeon: Alena Valenzuela MD;  Location:  GI     COLONOSCOPY  13    repeat in 2018     COLONOSCOPY N/A 10/4/2019    Procedure: COLONOSCOPY, WITH POLYPECTOMY AND BIOPSY;  Surgeon: Go Chong MD;  Location: [facility] OR     HERNIA REPAIR       IR CHEST TUBE PLACEMENT NON-TUNNELED RIGHT  2022     IR CVC TUNNEL PLACEMENT > 5 YRS OF AGE  2022     IR GASTROSTOMY TUBE CHANGE  2023     IR GASTROSTOMY TUBE PERCUTANEOUS PLCMNT  2022     IR PARACENTESIS  2022     IR PARACENTESIS  2022     IR THORACENTESIS  2022     IR TRANSCATHETER BIOPSY  2022     TRACHEOSTOMY N/A 2022    Procedure: TRACHEOSTOMY;  Surgeon: Nilesh Jackson MD;  Location:  OR     TRANSPLANT LIVER RECIPIENT  DONOR N/A 2016    Procedure: TRANSPLANT LIVER RECIPIENT  DONOR;  Surgeon: Enoc Crews MD;  Location: [facility] OR             Family History:   FAMILY HISTORY: No family history on file.    Family Psychiatric History:         Social History:   SOCIAL HISTORY:   Social History     Tobacco Use     Smoking status: Never     Smokeless tobacco: Never   Substance Use Topics     Alcohol use: Not Currently     Alcohol/week: 0.0 standard drinks                Physical ROS:   Patient could not comply with comprehensive review of systems today due to somnolence       Medications:       - MEDICATION INSTRUCTIONS for Dialysis Patients -   Does not apply See Admin Instructions     apixaban ANTICOAGULANT  5 mg Oral BID     folic acid  1 mg Oral or Feeding Tube Daily     lacosamide  100 mg Oral or Feeding Tube BID     lactulose  10 g Oral BID     levETIRAcetam  1,000 mg Oral or Feeding Tube Q24H     lidocaine  1 patch Transdermal Q24H     lidocaine   Transdermal Q8H BIJU     midodrine  10 mg Oral or Feeding Tube TID w/meals     mineral oil-hydrophilic  petrolatum   Topical Daily     nystatin  500,000 Units Swish & Spit 4x Daily     OLANZapine zydis  5 mg Oral At Bedtime     pantoprazole  40 mg Oral or Feeding Tube BID AC     ramelteon  8 mg Oral At Bedtime     rifaximin  550 mg Oral or Feeding Tube BID     sodium chloride (PF)  10-30 mL Intracatheter Q8H     sodium chloride (PF)  3 mL Intracatheter Q8H     tacrolimus  1 mg Oral BID IS     cholecalciferol  50 mcg Oral Daily              Allergies:   No Known Allergies       Labs:     Recent Results (from the past 48 hour(s))   Basic metabolic panel    Collection Time: 02/20/23  8:01 AM   Result Value Ref Range    Sodium 134 (L) 136 - 145 mmol/L    Potassium 5.0 3.4 - 5.3 mmol/L    Chloride 95 (L) 98 - 107 mmol/L    Carbon Dioxide (CO2) 29 22 - 29 mmol/L    Anion Gap 10 7 - 15 mmol/L    Urea Nitrogen 55.0 (H) 6.0 - 20.0 mg/dL    Creatinine 4.58 (H) 0.67 - 1.17 mg/dL    Calcium 9.0 8.6 - 10.0 mg/dL    Glucose 90 70 - 99 mg/dL    GFR Estimate 14 (L) >60 mL/min/1.73m2   Magnesium    Collection Time: 02/20/23  8:01 AM   Result Value Ref Range    Magnesium 2.0 1.7 - 2.3 mg/dL   Phosphorus    Collection Time: 02/20/23  8:01 AM   Result Value Ref Range    Phosphorus 6.7 (H) 2.5 - 4.5 mg/dL   Glucose by meter    Collection Time: 02/21/23  7:44 AM   Result Value Ref Range    GLUCOSE BY METER POCT 85 70 - 99 mg/dL   CBC with platelets    Collection Time: 02/21/23  8:51 AM   Result Value Ref Range    WBC Count 4.6 4.0 - 11.0 10e3/uL    RBC Count 2.83 (L) 4.40 - 5.90 10e6/uL    Hemoglobin 8.5 (L) 13.3 - 17.7 g/dL    Hematocrit 28.5 (L) 40.0 - 53.0 %     (H) 78 - 100 fL    MCH 30.0 26.5 - 33.0 pg    MCHC 29.8 (L) 31.5 - 36.5 g/dL    RDW 18.6 (H) 10.0 - 15.0 %    Platelet Count 129 (L) 150 - 450 10e3/uL          Physical and Psychiatric Examination:     /69 (BP Location: Left arm)   Pulse 84   Temp 97.7  F (36.5  C) (Oral)   Resp 17   Wt 87.6 kg (193 lb 2 oz)   SpO2 94%   BMI 26.19 kg/m    Weight is 193 lbs  1.97 oz  Body mass index is 26.19 kg/m .    Physical Exam:  I have reviewed the physical exam as documented by by the medical team and agree with findings and assessment and have no additional findings to add at this time.    Mental Status Exam:    Appearance: sleeping  Attitude:  Unable to assess as patient is sleeping  Eye Contact:  poor   Mood:  Unable to assess  Affect:  Unable to assess  Speech:  Unable to assess  Language: Fluent in english   Psychomotor Behavior:  no evidence of tardive dyskinesia, dystonia, or tics  Thought Process:  Patient sleeping  Associations:  Unable to assess  Thought Content:  Unable to assess  Insight:  Unable to assess  Judgement:  Unable to assess  Oriented to:  Unable to assess  Attention Span and Concentration:  Unable to assess  Recent and Remote Memory:  Unable to assess  Fund of Knowledge: Patient sleeping  Gait and Station: Patient sleeping               DSM-5 Diagnosis:     Delirium due to multiple etiologies, persistent    Status post liver transplant    Cirrhosis with ascites    Subacute bacterial peritonitis    End-stage renal disease            Assessment:   This is a 57-year-old male with a very complex medical picture.  He has a cirrhotic transplanted liver, end-stage renal disease, and multiple other medical issues that seem to have occurred after respiratory and subsequent PEA arrest.  He has had a seizure, and struggled with various infections.    The patient has been encephalopathic the last several days associated with restlessness.  He had Prozac initiated for 5 days ago, and then low-dose Vistaril a couple of days ago.  Prozac is a medication with the potential for multiple medication interactions, and can cause activation and agitation in a person who has no medical issues or the stressors that this patient is dealing with.  I talked to his brother, who had recommended that the patient go on Prozac because it was helpful for his anxiety.  I do recommend that  this medication be discontinued.  It has a lengthy half-life of up to 9 days for the parent drug and the active metabolite, so we will continue to linger over the next couple of weeks.    Also recommend discontinuation of Vistaril.  The family indicated that the restlessness has occurred over the last couple of days.  There could be many different potential etiologies of the patient's restlessness and hallucinations, but will discontinue the Vistaril in the event that this may be contributing, or causative.    Patient is improved since I saw him several weeks ago.  He was much more agitated and ill-appearing.  It is difficult to know how to guide the team on medications.  Many antipsychotics are sedating, and they are all liver metabolized.    5 mg of Zyprexa at at bedtime seems to be a reasonable dose, but may be causing daytime sedation.  If there is a concern regarding this, cutting the dose in half, and providing an as needed dose may be a way to try tapering down on the Zyprexa to see if he tolerates this and is less sedated during the day.    He does have a complex medical picture, and is deconditioned, so it simply may take him time to clear at this point.          Summary of Recommendations:     1.  If there is a concern that the Zyprexa may be causing sedation, recommend changing dose to 2.5 mg p.o. nightly and 2.5 mg p.o. nightly as needed for agitation.  If there is no concern regarding this, support continued use of 5 mg p.o. nightly.    2.  Keppra is a medication then can cause delirium and other psychiatric symptoms in some patients.  Consider reconsult of neurology to see whether or not the patient requires 2 AEDs going forward, and if taper off Keppra might be a possibility.  This may be low yield, but I do think it is in the differential that an adverse response to Keppra may be contributing to the overall picture.      Ofe Goetz MD  Consult/Liaison Psychiatry   [facility]

## 2023-02-22 ENCOUNTER — APPOINTMENT (OUTPATIENT)
Dept: SPEECH THERAPY | Facility: CLINIC | Age: 58
DRG: 981 | End: 2023-02-22
Attending: STUDENT IN AN ORGANIZED HEALTH CARE EDUCATION/TRAINING PROGRAM
Payer: COMMERCIAL

## 2023-02-22 LAB
ALBUMIN SERPL BCG-MCNC: 2.4 G/DL (ref 3.5–5.2)
ANION GAP SERPL CALCULATED.3IONS-SCNC: 9 MMOL/L (ref 7–15)
BUN SERPL-MCNC: 23.4 MG/DL (ref 6–20)
CALCIUM SERPL-MCNC: 8.6 MG/DL (ref 8.6–10)
CHLORIDE SERPL-SCNC: 98 MMOL/L (ref 98–107)
CREAT SERPL-MCNC: 2.4 MG/DL (ref 0.67–1.17)
DEPRECATED HCO3 PLAS-SCNC: 28 MMOL/L (ref 22–29)
GFR SERPL CREATININE-BSD FRML MDRD: 31 ML/MIN/1.73M2
GLUCOSE SERPL-MCNC: 90 MG/DL (ref 70–99)
HGB BLD-MCNC: 8.2 G/DL (ref 13.3–17.7)
PHOSPHATE SERPL-MCNC: 3.2 MG/DL (ref 2.5–4.5)
POTASSIUM SERPL-SCNC: 3.5 MMOL/L (ref 3.4–5.3)
SODIUM SERPL-SCNC: 135 MMOL/L (ref 136–145)

## 2023-02-22 PROCEDURE — 82040 ASSAY OF SERUM ALBUMIN: CPT | Performed by: INTERNAL MEDICINE

## 2023-02-22 PROCEDURE — 250N000013 HC RX MED GY IP 250 OP 250 PS 637: Performed by: STUDENT IN AN ORGANIZED HEALTH CARE EDUCATION/TRAINING PROGRAM

## 2023-02-22 PROCEDURE — 99233 SBSQ HOSP IP/OBS HIGH 50: CPT | Performed by: INTERNAL MEDICINE

## 2023-02-22 PROCEDURE — 250N000011 HC RX IP 250 OP 636: Performed by: INTERNAL MEDICINE

## 2023-02-22 PROCEDURE — 36415 COLL VENOUS BLD VENIPUNCTURE: CPT | Performed by: INTERNAL MEDICINE

## 2023-02-22 PROCEDURE — 85018 HEMOGLOBIN: CPT | Performed by: INTERNAL MEDICINE

## 2023-02-22 PROCEDURE — 250N000012 HC RX MED GY IP 250 OP 636 PS 637: Performed by: STUDENT IN AN ORGANIZED HEALTH CARE EDUCATION/TRAINING PROGRAM

## 2023-02-22 PROCEDURE — P9045 ALBUMIN (HUMAN), 5%, 250 ML: HCPCS | Performed by: INTERNAL MEDICINE

## 2023-02-22 PROCEDURE — 250N000013 HC RX MED GY IP 250 OP 250 PS 637: Performed by: INTERNAL MEDICINE

## 2023-02-22 PROCEDURE — 250N000013 HC RX MED GY IP 250 OP 250 PS 637

## 2023-02-22 PROCEDURE — 90937 HEMODIALYSIS REPEATED EVAL: CPT

## 2023-02-22 PROCEDURE — 250N000009 HC RX 250: Performed by: STUDENT IN AN ORGANIZED HEALTH CARE EDUCATION/TRAINING PROGRAM

## 2023-02-22 PROCEDURE — 258N000003 HC RX IP 258 OP 636: Performed by: INTERNAL MEDICINE

## 2023-02-22 PROCEDURE — 92526 ORAL FUNCTION THERAPY: CPT | Mod: GN

## 2023-02-22 PROCEDURE — 120N000001 HC R&B MED SURG/OB

## 2023-02-22 PROCEDURE — 634N000001 HC RX 634: Performed by: INTERNAL MEDICINE

## 2023-02-22 PROCEDURE — 250N000013 HC RX MED GY IP 250 OP 250 PS 637: Performed by: HOSPITALIST

## 2023-02-22 PROCEDURE — 99232 SBSQ HOSP IP/OBS MODERATE 35: CPT | Performed by: STUDENT IN AN ORGANIZED HEALTH CARE EDUCATION/TRAINING PROGRAM

## 2023-02-22 RX ORDER — B COMPLEX, C NO.20/FOLIC ACID 1 MG
1 CAPSULE ORAL DAILY
Status: DISCONTINUED | OUTPATIENT
Start: 2023-02-22 | End: 2023-04-28 | Stop reason: HOSPADM

## 2023-02-22 RX ORDER — CALCIUM ACETATE 667 MG/1
667 CAPSULE ORAL
Status: DISCONTINUED | OUTPATIENT
Start: 2023-02-22 | End: 2023-03-08

## 2023-02-22 RX ADMIN — CALCIUM ACETATE 667 MG: 667 CAPSULE ORAL at 18:15

## 2023-02-22 RX ADMIN — Medication 50 MCG: at 08:30

## 2023-02-22 RX ADMIN — Medication 40 MG: at 08:29

## 2023-02-22 RX ADMIN — SODIUM CHLORIDE 250 ML: 9 INJECTION, SOLUTION INTRAVENOUS at 08:55

## 2023-02-22 RX ADMIN — FOLIC ACID 1 MG: 1 TABLET ORAL at 08:30

## 2023-02-22 RX ADMIN — APIXABAN 5 MG: 5 TABLET, FILM COATED ORAL at 20:50

## 2023-02-22 RX ADMIN — TACROLIMUS 1 MG: 5 CAPSULE ORAL at 23:41

## 2023-02-22 RX ADMIN — LACTULOSE 10 G: 10 SOLUTION ORAL at 12:47

## 2023-02-22 RX ADMIN — MIDODRINE HYDROCHLORIDE 10 MG: 5 TABLET ORAL at 12:48

## 2023-02-22 RX ADMIN — ACETAMINOPHEN 650 MG: 325 TABLET, FILM COATED ORAL at 23:49

## 2023-02-22 RX ADMIN — LACOSAMIDE 100 MG: 10 SOLUTION ORAL at 12:47

## 2023-02-22 RX ADMIN — ALBUMIN HUMAN 250 ML: 0.05 INJECTION, SOLUTION INTRAVENOUS at 09:30

## 2023-02-22 RX ADMIN — MIDODRINE HYDROCHLORIDE 10 MG: 5 TABLET ORAL at 18:15

## 2023-02-22 RX ADMIN — OLANZAPINE 5 MG: 5 TABLET, ORALLY DISINTEGRATING ORAL at 08:29

## 2023-02-22 RX ADMIN — OLANZAPINE 5 MG: 5 TABLET, ORALLY DISINTEGRATING ORAL at 21:19

## 2023-02-22 RX ADMIN — LEVETIRACETAM 1000 MG: 100 SOLUTION ORAL at 21:38

## 2023-02-22 RX ADMIN — Medication 1 CAPSULE: at 12:48

## 2023-02-22 RX ADMIN — CALCIUM ACETATE 667 MG: 667 CAPSULE ORAL at 12:48

## 2023-02-22 RX ADMIN — RIFAXIMIN 550 MG: 550 TABLET ORAL at 20:49

## 2023-02-22 RX ADMIN — LIDOCAINE 1 PATCH: 560 PATCH PERCUTANEOUS; TOPICAL; TRANSDERMAL at 12:48

## 2023-02-22 RX ADMIN — APIXABAN 5 MG: 5 TABLET, FILM COATED ORAL at 08:30

## 2023-02-22 RX ADMIN — RIFAXIMIN 550 MG: 550 TABLET ORAL at 08:29

## 2023-02-22 RX ADMIN — ACETAMINOPHEN 650 MG: 325 TABLET, FILM COATED ORAL at 10:03

## 2023-02-22 RX ADMIN — HEPARIN SODIUM 1600 UNITS: 1000 INJECTION INTRAVENOUS; SUBCUTANEOUS at 10:52

## 2023-02-22 RX ADMIN — WHITE PETROLATUM: 1.75 OINTMENT TOPICAL at 12:46

## 2023-02-22 RX ADMIN — MIDODRINE HYDROCHLORIDE 10 MG: 5 TABLET ORAL at 08:29

## 2023-02-22 RX ADMIN — LACOSAMIDE 100 MG: 10 SOLUTION ORAL at 23:42

## 2023-02-22 RX ADMIN — EPOETIN ALFA-EPBX 20000 UNITS: 10000 INJECTION, SOLUTION INTRAVENOUS; SUBCUTANEOUS at 10:48

## 2023-02-22 RX ADMIN — TACROLIMUS 1 MG: 5 CAPSULE ORAL at 08:30

## 2023-02-22 RX ADMIN — TACROLIMUS 1 MG: 5 CAPSULE ORAL at 02:30

## 2023-02-22 RX ADMIN — RAMELTEON 8 MG: 8 TABLET, FILM COATED ORAL at 20:49

## 2023-02-22 RX ADMIN — Medication 40 MG: at 15:42

## 2023-02-22 RX ADMIN — SODIUM CHLORIDE 300 ML: 9 INJECTION, SOLUTION INTRAVENOUS at 07:40

## 2023-02-22 ASSESSMENT — ACTIVITIES OF DAILY LIVING (ADL)
ADLS_ACUITY_SCORE: 65
ADLS_ACUITY_SCORE: 63
ADLS_ACUITY_SCORE: 65
ADLS_ACUITY_SCORE: 63
ADLS_ACUITY_SCORE: 65
ADLS_ACUITY_SCORE: 62
ADLS_ACUITY_SCORE: 64
ADLS_ACUITY_SCORE: 65
ADLS_ACUITY_SCORE: 62
ADLS_ACUITY_SCORE: 65

## 2023-02-22 NOTE — PLAN OF CARE
Goal Outcome Evaluation:       DATE & TIME: 2/22/23 7946-3049  Cognitive Concerns/ Orientation : Pt alert and oriented x2 to person/place, pt seems more oriented this afternoon, confused at times   BEHAVIOR & AGGRESSION TOOL COLOR: Agitated and restless this am, PRN Zyprexa given, calm and cooperative this afternoon   ABNL VS/O2: VSS on RA, BP low during dialysis-Albumin given in dialysis, BP stable after dialysis, RA.  MOBILITY: Lift, fall risk, able to reposition self in bed, restless at times  PAIN MANAGMENT: c/o  back pain-medicated with Tylenol x1, Lidocaine patch applied to back, repositioning   DIET: Soft, bite sized with moderately thick liquids. Feeds himself- fair appetite. Bolus tube feeding given in evening started at 1815 for 3 hours, and PRN, speech following and plan to repeat video swallow tomorrow.   BOWEL/BLADDER: Incontinent of bowel, BM x3, anuric, dialysis today, recommend hold dialysis this evening.   DRAIN/DEVICES: PIV SL. Right external jugular tunneled CVC. PEG tube, abdominal binder on  SKIN: Scattered scabs and bruises. Blanchable redness to elbows. Mepilex on elbows.   D/C DATE: Discharge pending progress  OTHER IMPORTANT INFO:  Psyche following.  All medications given via feeding tube or oral with thickened liquids crushed. Weak, nonproductive cough.

## 2023-02-22 NOTE — PLAN OF CARE
Goal Outcome Evaluation:  Cognitive Concerns/ Orientation : Pt A/Ox1 oriented to self  BEHAVIOR & AGGRESSION TOOL COLOR: Green, calm/cooperative   ABNL VS/O2: VSS on RA  MOBILITY: Lift, fall risk, able to reposition self in bed, restless at times  PAIN MANAGMENT: c/o  back pain-medicated with Tylenol x1  DIET: Soft, bite sized with moderately thick liquids. Feeds himself- fair appetite. Bolus given yesterday [id number]  BOWEL/BLADDER: Incontinent of bowel, BM x1, anuric, dialysis today  DRAIN/DEVICES: PIV SL. Right external jugular tunneled CVC. PEG tube, abdominal binder on  SKIN: Scattered scabs and bruises. Blanchable redness to coccyx and elbows. Mepilex on bilateral elbows.   D/C DATE: Discharge pending progress  OTHER IMPORTANT INFO: Pt slept on/off this shift, cooperative with cares, no urine output, BM x1, appears comfortable, refuses Lido patch, Psych following.

## 2023-02-22 NOTE — PROGRESS NOTES
[facility]    Medicine Progress Note - Hospitalist Service    Date of Admission:  1/21/2023    Assessment & Plan   Blanco Osborne is a 57 year-old male admitted on 1/21/2023 as a transfer from [facility] for evaluation of loculated pleural effusion. He has extensive PMH including h/o liver transplant 2016 due to alcohol abuse, pAF on chronic anticoagulation, history of diabetes mellitus type 2, CVA, hypertension, CHRISTIAN on BiPAP.  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  He was recovering in [facility] and was transferred to [facility] for consideration of chest tube placement and possible intrapleural lysis for worsening effusion.    Acute metabolic encephalopathy with delirium, multifactorial  Hepatic encephalopathy  Chronic liver disease, history of liver transplant 2016  End-stage renal disease (ESRD), on hemodialysis (HD)  History of seizure, on Keppra and Vimpat  Waxing and waning mentation, coinciding with lactulose being held at [facility]  Earlier in hospital stay, remained confused and had pulled out tracheostomy tube, PICC line and pulled his dialysis catheter.  Lines replaced.  Palliative care consult 1/26, see note.  * Psychiatry consulted 2/2, see note, follow-up requested.    On/off fluctuating mentation, recently improving 2/17, 2/18    Continue to reorient as needed; maintain normal day/night, sleep wake cycles; minimize sedating/altering medications as able    Continue Lactulose to 10 mg BID, monitor for symptoms to have 2-3 Bms    Zyprexa 5 mg at bedtime scheduled.  Melatonin 3 mg p.o. at bedtime scheduled    Olanzapine BID as needed for severe agitation or anxiety; monitor for side effets    Mittens/sitter as needed; minimize use of restraints as able    Ongoing  hemodialysis, as per nephrology, on M-W-F dialysis schedule; ongoing nephrology consult follow-up appreciated    Dialysis labs as per nephrology    Vitamin D levelwas ordered per family request; nephrology managing and treating    Psychiatry consult follow-up requested, last visit 2/2, help appreciated    2/20/23Off restraints.Mentation has improved    2/21/23 -Fall this morningWorsening.Delirium Ramelteon started.Psyciatry consult appreciated     Bilateral pleural effusions   Acute respiratory failure requiring tracheostomy  - resolved    Pneumonia  - resolved   ARDS  - resolved    Right pneumothorax - resolved   *Initial event was parainfluenza and strep pneumo pneumonia back in November 2022. Treated for ARDS. Had failed extubation x2 and tracheostomy performed on previous hospitalization. *Had additional complications of bilateral pneumothoraces as well as hydropneumothorax- pleural fluid grew candida parapsilosis  *Completed 4-week antifungal treatment. While in LTACH he was successfully weaned from the ventilator.  *Recently there were concern for worsening effusion while at [facility] and had thoracentesis 01/13 which returned exudative without growth on cultures. CT chest/abdomen/pelvis on 01/18 demonstrated worsening effusions and concerns for infected parapneumonic effusions with possible SBP.  Multiple pulmonologist at [facility] reviewed CT scan recommended transfer to [facility] for consideration of chest tube placement and possible intrapleural lysis  *Thoracic surgery (Dr. Jackson) consulted during admission   *CT chest 1/22, small effusions on imaging and so chest tube was not inserted.   ID consulted, see note 2/10.  *Patient pulled out his tracheostomy tube on the night 2/1-2/2 and is tolerating well without increased shortness of breath persistent cough or worsening hypoxia  * Chest x-ray 2/3 with cardiomegaly, chronic small bilateral pleural effusions, no pulmonary edema; right internal jugular dialysis  "catheter in place    Monitor respiratory status, recently stable on room air    Encourage incentive spirometry as able    Wound care previously consulted for tracheostomy site care, monitor for signs and symptoms of infection  2/20/23 Stable on RA  2/22/23 Continues to be on RA             S/p liver transplant 2016   Chronic immunosuppression, on tacrolimus  Cirrhosis with ascites  Subacute bacterial peritonitis (SBP), resolved  *Main manifestations of this are hepatic encephalopathy and ascites.  Hepatologist at [place] felt that most likely reason for the cirrhosis was metabolic syndrome or alcohol intake.  There was no evidence of rejection on biopsy and there was some evidence of regeneration. Paracentesis done on 12/22/2022 showed lactobacillus indicating SBP, treated with Unasyn and Augmentin, finished 2-week course 1/12/2023.  Requires large-volume paracentesis periodically for recurring ascites.    *Paracentesis 1/13/2023 yielded about 7500 cc. Cultures NGTD.  Most recent paracentesis on 1/24 with 4.8 L fluid removal    Continue intermittent paracentesis as needed if develops symptomatic ascites    Monitor for signs and symptoms of recurrent spontaneous bacterial peritonitis     Further treatment for recurrent ascites with TIPS deferred to his Liver Transplant team and/or Hepatology, he has an appointment on 4/21/2023 with Hepatology, Dr. Simms    Continue Tacrolimus 1 mg BID.    Continue rifaximin 550 mg BID    Continue lactulose as ordered, titrate as needed to have 2-3 bms    GI consult as needed in hospital for new acute issues, otherwise as outpatient    Goals of care   As per prior rounding provider, \"on 1/25 nursing had mentioned that patient had refused to undergo dialysis and want to stop care, palliative care was consulted.  Patient and his spouse denied wanting to stop care and wanted ongoing restorative care including full code\"    Monitor, Full Code status     Diarrhea, improved, on lactulose " for chronic liver disease  - C difficile negative on 1/31. Secondary to lactulose.  - discontinued rectal tube (2/12) as stools more formed    Continue lactulose with goal of 2 to 3 soft bowel movement in 24 hours     Paroxysmal atrial fibrillation  Chronic anticoagulation, apixaban  Remains in sinus rhythm, on anticoagulation with apixaban indefinitely for stroke prophylaxis.      Monitor rhythm    Continue apixaban anticoagulation    Monitor hemoglobin, see below     Acute anemia  Pt has required intermittent transfusions for on-going anemia.  Anemia most likely secondary to critical illness/chronic disease, chronic renal disease.  Baseline hemoglobin around 7-8    Transfuse packed red blood cells to keep hemoglobin > 7    Hemoglobin 8.1 on 2/17    Epoetin lfa-epbx use as per nephrology    Hb 8.2 today     History PEA arrest   PEA arrest prior to his previous admission and 1 episode of PEA associated with desaturation on 12/20.  No structural cardiac disease.     Stable, continue cardiac Monitoring     Chronic Hypotension  In the past has developed baseline hypotension with cirrhosis and prolonged critical illness.  On hemodialysis.    Continue midodrine, as per nephrology      History of seizure   After hypoxic event, in the context of baseline multifactorial encephalopathy secondary to his ongoing critical illness and prior cardiac arrests and prior strokes and cirrhosis with hepatic encephalopathy. MRI of head of 12/20/22 reveals no PRES or leptomeningeal enhancement but scattered.  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   No recent seizure activity.    Continue levetiracetam, lacosamide     Seizure precautions    Outpatient follow-up with neurology, consult inpatient if needed    # ESRD  # Anemia due to renal disease  # Hypotension during dialysis  -Underwent dialysis today and was hyptensive and received midodrine and albumin        Diabetes mellitus  type 2  *Hemoglobin A1c on November 2022 was 4.7  *By report, on Lantus insulin in the past.  Blood sugar checks and sliding scale insulin previously discontinued as has been stable without insulin needs    Monitor with periodic blood sugar checks as needed     Severe malnutrition, protein and calorie type  Moderate dysphagia  G-tube feedings    Continue with tube feeds via PEG tube    IR replaced the PEG tube on 2/8/2023, monitor    Nutritionist consulted and following for tube feeds    SLP following as well    Oral diet as per speech and language therapy (SLP)     2/20 Nutrition following for tube feeds    Will undergo video swallow tomorrow     Anxiety  Fluoxetine was discontinued as per psychiatry recommendation on 2/2 (see consult note for details)     Stable, monitor; comfort and reassurance offered during visit    Psychiatry follow-up     Disposition     Estimated length of stay several days    Anticipated discharge to transitional care unit (TCU), long-term care, or LTACH; social work consulted, help appreciated; previously at [facility] and by report not able to return there (note 2/10)     Diet: Room Service  Soft & Bite Sized Diet (level 6) Liquidized/Moderately Thick (level 3) (By tsp only); No Straws  Adult Formula Bolus Feeding: Novasource Renal; Route: Gastrostomy; 3 times daily; Volume per Bolus: 240; mL(s); EVERYDAY please bolus at 80 mL/hr x3 hrs at 6pm. Add additional bolus feedings x2 at 80 mL/hr x 3 hrs as back up bolus feeding after br...  Snacks/Supplements Adult: Other; Gelatein+ with lunch and magic cup with dinner (RD); With Meals    DVT Prophylaxis: DOAC  Nixon Catheter: Not present  Lines: PRESENT      CVC Double Lumen Right External jugular Tunneled-Site Assessment: WDL      Cardiac Monitoring: None  Code Status: Full Code      Clinically Significant Risk Factors              # Hypoalbuminemia: Lowest albumin = 1.9 g/dL at 1/23/2023  6:38 AM, will monitor as appropriate   #  Thrombocytopenia: Lowest platelets = 129 in last 2 days, will monitor for bleeding          # Severe Malnutrition: based on nutrition assessment    Family Ofe and Brother Dylan updated on 2/21/23    Disposition Plan      Expected Discharge Date: 02/27/2023, 12:00 PM    Destination: inpatient rehabilitation facility  Discharge Comments: admitted on 1-21 from [facility], Dialysis pt MWF. Will need placement          Ashkan Hernandez MD  Hospitalist Service  [facility]  Securely message with [email or web address] (more info)  Text page via [employer] Paging/Directory   ______________________________________________________________________    Interval History   Has been confused  Having 2-3 Bms   Was hypotensive during dilaysis  Intermittent confusion  Physical Exam   Vital Signs: Temp: 98  F (36.7  C) Temp src: Oral BP: 105/63 Pulse: 80   Resp: 18 SpO2: 96 % O2 Device: None (Room air)    Weight: 181 lbs 7.02 oz    Physical Exam  Cardiovascular:      Rate and Rhythm: Normal rate and regular rhythm.      Heart sounds: Normal heart sounds.   Pulmonary:      Effort: Pulmonary effort is normal. No respiratory distress.   Abdominal:      General: There is distension.      Palpations: Abdomen is soft.   Musculoskeletal:      Right lower leg: No edema.      Left lower leg: No edema.   Neurological:      Mental Status: He is alert.       Medical Decision Making             Data     I have personally reviewed the following data over the past 24 hrs:    N/A  \   8.2 (L)   / N/A     135 (L) 98 23.4 (H) /  90   3.5 28 2.40 (H) \       ALT: N/A AST: N/A AP: N/A TBILI: N/A   ALB: 2.4 (L) TOT PROTEIN: N/A LIPASE: N/A       Imaging results reviewed over the past 24 hrs:   No results found for this or any previous visit (from the past 24 hour(s)).   Creatinine   Date Value Ref Range Status   02/22/2023 2.40 (H) 0.67 - 1.17 mg/dL Final   04/20/2020 1.06 0.66 - 1.25 mg/dL Final     Recent Labs   Lab 02/22/23  1024  02/21/23  0851 02/17/23  0808   HGB 8.2* 8.5* 8.1*

## 2023-02-22 NOTE — PROGRESS NOTES
Potassium   Date Value Ref Range Status   02/20/2023 5.0 3.4 - 5.3 mmol/L Final   12/29/2022 3.4 3.4 - 5.3 mmol/L Final   04/20/2020 3.9 3.4 - 5.3 mmol/L Final     Potassium POCT   Date Value Ref Range Status   01/19/2023 3.6 3.5 - 5.0 mmol/L Final     Hemoglobin   Date Value Ref Range Status   02/22/2023 8.2 (L) 13.3 - 17.7 g/dL Final   04/20/2020 14.3 13.3 - 17.7 g/dL Final     Creatinine   Date Value Ref Range Status   02/20/2023 4.58 (H) 0.67 - 1.17 mg/dL Final   04/20/2020 1.06 0.66 - 1.25 mg/dL Final     Urea Nitrogen   Date Value Ref Range Status   02/20/2023 55.0 (H) 6.0 - 20.0 mg/dL Final   12/29/2022 46 (H) 7 - 30 mg/dL Final   04/20/2020 23 7 - 30 mg/dL Final     Sodium   Date Value Ref Range Status   02/20/2023 134 (L) 136 - 145 mmol/L Final   04/20/2020 139 133 - 144 mmol/L Final     INR   Date Value Ref Range Status   12/21/2022 1.36 (H) 0.85 - 1.15 Final   10/07/2019 1.20 (H) 0.86 - 1.14 Final       DIALYSIS PROCEDURE NOTE  Hepatitis status of previous patient on machine log was checked and verified ok to use with this patients hepatitis status.   Latest Reference Range & Units 02/02/23 06:18   Hep B Surface Agn Nonreactive  Nonreactive   Hepatitis B Core Angela Nonreactive  Nonreactive   Hepatitis B Surface Antibody Instrument Value <8.00 m[IU]/mL 0.54   Hepatitis B Surface Antibody  Nonreactive     Patient dialyzed for 3 hrs. on a K2 bath with a net fluid removal of  0L.  A BFR of 250 ml/min was obtained via a right tunneled CVC.      The treatment plan was discussed with Dr. Moulton during the treatment.    Total heparin received during the treatment: 0 units.   Line flushed, clamped and capped with heparin 1:1000 1.9 mL (1900 units) per lumen    Meds  given: epogen 20,000 units, 5% albumin 250ml   Complications: Patient BP soft upon arrival and dropped to the 60's systolic early on into tx. UF off, 400ml NS bolus given and Dr. Moulton notified. Order for 250ml 5% albumin obtained and given. Pt  responded well and BP recovered. Able to turn UF back on with a goal to keep patient even. VSS post run.    Person educated: patient. Knowledge base minimal. Barriers to learning: none. Educated on procedure via verbal mode. Patient verbalized understanding. Pt prefers oral education style.     ICEBOAT? Timeout performed pre-treatment  I: Patient was identified using 2 identifiers  C:  Consent Signed Yes  E: Equipment preventative maintenance is current and dialysis delivery system OK to use  B: Hepatitis B Status: see above  O: Dialysis orders present and complete prior to treatment  A: Vascular access verified and assessed prior to treatment  T: Treatment was performed at a clinically appropriate time  ?: Patient was allowed to ask questions and address concerns prior to treatment  See Adult Hemodialysis flowsheet in [facility] for further details and post assessment.  Machine water alarm in place and functioning. Transducer pods intact and checked every 15min.   Pt returned via bed to 632-2.  Chlorine/Chloramine water system checked every 4 hours.  Dialysis qMWF.    Patient repositioned every 2 hours during the treatment.    Post treatment report given to VI Burnett RN.

## 2023-02-22 NOTE — PROGRESS NOTES
Inpatient Dialysis Progress Note            Assessment and Plan:       Following ARF without recovery/current dialysis dependence      1) End Stage Kidney Disease (CKD and subsequent ATN with non-recovery)  Initially LORETTA, but no renal recovery (anuric/oliguric), now considered ESRD. Chronic MWF HD via CVC, 3 hr runs.    CVC function is poor today.  Lines reversed.  BFR not at target.  Flow is very positional.       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  PTH is below target.  Hard to get this to increase.  Vit D also below target.  On Vit D 2000 units daily.  Phos 6.7.  He needs a binder.       HD complicated with hypotension. On midodrine 10mg TID.  BP lower than usual today.  Albumin 5% 250 mL given IV bolus.       2) Anemia:  EPO 20 K units.   persistent anemia despite adequate iron stores and high epo dosing suggesting some epo resistance.      3) disposition:   Not returning to LTACH. In case not able to go to inpatient rehab, would consider [facility] for TCU/rehab and dialysis. He is not currently a candidate for outpt HD.       4) Diffuse pruritis   Pruritis might be related to his renal disease. Trial urea cream.       Other:  Hx liver transplant 2016  Confusion/encephlopathy  DM  AMS, delirium        Plan/Recs:  1) HD today, continue MWF schedule  2) Agree that In current state not outpatient dialysis candidate   3) Support BP with albumin 5%.   4) Ca Ac c meals.              Interval History:     BP lower today.  BP 85/58 even before he started HD.  BP down to 68/42 after HD initiation.    He did receive his midodrine 10 mg this AM at 0830.  5% albumin 250 mL given as bolus just now.    Latest pressure 93/59.   Fluid removal will not be an option today.          Dialysis Parameters:     Wt Readings from Last 4 Encounters:   02/22/23 82.3 kg (181 lb 7 oz)   01/21/23 82.4 kg (181 lb 11.2 oz)   12/28/22 96 kg (211 lb 10.3 oz)   12/16/22 100.2 kg (221 lb)     I/O last 3 completed shifts:  In:  490 [P.O.:120; NG/GT:130]  Out: -   BP Readings from Last 3 Encounters:   02/22/23 (!) 66/56   01/21/23 120/71   12/29/22 (!) 139/94       Routine, ONE TIME, Starting today For 1 Occurrences  Weight Loss (kg): 0  Dialysis Temp: 36.5  C  Access Device: CVC  Access Site: R IJ  Dialyzer: Revaclear  Dialysis Bath: K 2  Blood Flow Rate (mL/min): 350  Total Treatment Time (hrs): 3  Heparin: no         Medications and Allergies:   Reviewed in EPIC      - MEDICATION INSTRUCTIONS for Dialysis Patients -   Does not apply See Admin Instructions     sodium chloride 0.9%  250 mL Intravenous Once in dialysis/CRRT     sodium chloride 0.9%  300 mL Hemodialysis Machine Once     albumin human  250 mL Intravenous Once     apixaban ANTICOAGULANT  5 mg Oral BID     epoetin mirta-epbx  20,000 Units Intravenous Once     folic acid  1 mg Oral or Feeding Tube Daily     sodium chloride (PF) 0.9%  10 mL Intracatheter Once in dialysis/CRRT    Followed by     heparin  1.3-2.6 mL Intracatheter Once in dialysis/CRRT     sodium chloride (PF) 0.9%  10 mL Intracatheter Once in dialysis/CRRT    Followed by     heparin  1.3-2.6 mL Intracatheter Once in dialysis/CRRT     lacosamide  100 mg Oral or Feeding Tube BID     lactulose  10 g Oral BID     levETIRAcetam  1,000 mg Oral or Feeding Tube Q24H     lidocaine  1 patch Transdermal Q24H     lidocaine   Transdermal Q8H BIJU     midodrine  10 mg Oral or Feeding Tube TID w/meals     mineral oil-hydrophilic petrolatum   Topical Daily     - MEDICATION INSTRUCTIONS -   Does not apply Once     nystatin  500,000 Units Swish & Spit 4x Daily     OLANZapine zydis  5 mg Oral At Bedtime     pantoprazole  40 mg Oral or Feeding Tube BID AC     ramelteon  8 mg Oral QPM     rifaximin  550 mg Oral or Feeding Tube BID     sodium chloride (PF)  10-30 mL Intracatheter Q8H     sodium chloride (PF)  3 mL Intracatheter Q8H     sodium chloride (PF)  9 mL Intracatheter During Dialysis/CRRT (from stock)     sodium chloride (PF)  9  mL Intracatheter During Dialysis/CRRT (from stock)     tacrolimus  1 mg Oral BID IS     cholecalciferol  50 mcg Oral Daily     sodium chloride 0.9%, acetaminophen **OR** acetaminophen, acetylcysteine, albuterol, alteplase, alteplase, calcium carbonate, artificial tears, glucose **OR** dextrose **OR** glucagon, ipratropium - albuterol 0.5 mg/2.5 mg/3 mL, lidocaine 4%, lidocaine (buffered or not buffered), melatonin, mineral oil-hydrophilic petrolatum, naloxone **OR** naloxone **OR** naloxone **OR** naloxone, nitroGLYcerin, OLANZapine zydis, ondansetron **OR** ondansetron, simethicone, sodium chloride (PF), sodium chloride (PF), sodium chloride (PF), urea   No Known Allergies           Labs:     BMP  Recent Labs   Lab 02/21/23  0744 02/20/23  0801 02/17/23  0808   NA  --  134* 136   POTASSIUM  --  5.0 4.4   CHLORIDE  --  95* 94*   KELLY  --  9.0 9.3   CO2  --  29 32*   BUN  --  55.0* 46.6*   CR  --  4.58* 3.92*   GLC 85 90 96     CBC  Recent Labs   Lab 02/21/23  0851 02/17/23  0808   WBC 4.6 6.1   HGB 8.5* 8.1*   HCT 28.5* 28.5*   * 105*   * 104*     Lab Results   Component Value Date    AST 37 02/17/2023    ALT 9 (L) 02/17/2023    ALKPHOS 216 (H) 02/17/2023    BILITOTAL 0.4 02/17/2023    CHANDLER 29 02/14/2023            Physical Exam:   Vitals were reviewed in [facility]    Wt Readings from Last 3 Encounters:   02/22/23 82.3 kg (181 lb 7 oz)   01/21/23 82.4 kg (181 lb 11.2 oz)   12/28/22 96 kg (211 lb 10.3 oz)       Intake/Output Summary (Last 24 hours) at 2/20/2023 1012  Last data filed at 2/19/2023 1850  Gross per 24 hour   Intake 240 ml   Output --   Net 240 ml       GENERAL APPEARANCE: pleasant, no distress, awake  HEENT:  Eyes/ears/nose grossly normal, neck supple  RESP: lungs clear to auscultation with good efforts, no crackles, rhonchi or wheezes  CV: regular rate and rhythm, normal S1 S2, no murmur, click or rub   ABDOMEN: soft, diffuse mild tenderness, no guarding    EXTREMITIES/SKIN: no edema, no rashes  or lesions     Pt seen on dialysis.  Stable run.  Good BFR.      Attestation:  I have reviewed today's vital signs, notes, medications, labs and imaging.     Kb Moulton MD  [facility] - Nephrology  [phone]

## 2023-02-23 ENCOUNTER — APPOINTMENT (OUTPATIENT)
Dept: SPEECH THERAPY | Facility: CLINIC | Age: 58
DRG: 981 | End: 2023-02-23
Attending: STUDENT IN AN ORGANIZED HEALTH CARE EDUCATION/TRAINING PROGRAM
Payer: COMMERCIAL

## 2023-02-23 ENCOUNTER — APPOINTMENT (OUTPATIENT)
Dept: GENERAL RADIOLOGY | Facility: CLINIC | Age: 58
DRG: 981 | End: 2023-02-23
Attending: STUDENT IN AN ORGANIZED HEALTH CARE EDUCATION/TRAINING PROGRAM
Payer: COMMERCIAL

## 2023-02-23 ENCOUNTER — APPOINTMENT (OUTPATIENT)
Dept: PHYSICAL THERAPY | Facility: CLINIC | Age: 58
DRG: 981 | End: 2023-02-23
Attending: STUDENT IN AN ORGANIZED HEALTH CARE EDUCATION/TRAINING PROGRAM
Payer: COMMERCIAL

## 2023-02-23 PROCEDURE — 250N000012 HC RX MED GY IP 250 OP 636 PS 637: Performed by: STUDENT IN AN ORGANIZED HEALTH CARE EDUCATION/TRAINING PROGRAM

## 2023-02-23 PROCEDURE — 97110 THERAPEUTIC EXERCISES: CPT | Mod: GP

## 2023-02-23 PROCEDURE — 120N000001 HC R&B MED SURG/OB

## 2023-02-23 PROCEDURE — 250N000009 HC RX 250: Performed by: STUDENT IN AN ORGANIZED HEALTH CARE EDUCATION/TRAINING PROGRAM

## 2023-02-23 PROCEDURE — 99232 SBSQ HOSP IP/OBS MODERATE 35: CPT | Performed by: STUDENT IN AN ORGANIZED HEALTH CARE EDUCATION/TRAINING PROGRAM

## 2023-02-23 PROCEDURE — 250N000013 HC RX MED GY IP 250 OP 250 PS 637

## 2023-02-23 PROCEDURE — 250N000013 HC RX MED GY IP 250 OP 250 PS 637: Performed by: INTERNAL MEDICINE

## 2023-02-23 PROCEDURE — 250N000013 HC RX MED GY IP 250 OP 250 PS 637: Performed by: STUDENT IN AN ORGANIZED HEALTH CARE EDUCATION/TRAINING PROGRAM

## 2023-02-23 PROCEDURE — 250N000013 HC RX MED GY IP 250 OP 250 PS 637: Performed by: HOSPITALIST

## 2023-02-23 PROCEDURE — 97530 THERAPEUTIC ACTIVITIES: CPT | Mod: GP

## 2023-02-23 PROCEDURE — 92611 MOTION FLUOROSCOPY/SWALLOW: CPT | Mod: GN

## 2023-02-23 PROCEDURE — 74230 X-RAY XM SWLNG FUNCJ C+: CPT

## 2023-02-23 RX ORDER — GUAIFENESIN 600 MG/1
600 TABLET, EXTENDED RELEASE ORAL 2 TIMES DAILY
Status: DISCONTINUED | OUTPATIENT
Start: 2023-02-23 | End: 2023-03-13

## 2023-02-23 RX ORDER — BARIUM SULFATE 400 MG/ML
SUSPENSION ORAL ONCE
Status: COMPLETED | OUTPATIENT
Start: 2023-02-23 | End: 2023-02-23

## 2023-02-23 RX ADMIN — LIDOCAINE 1 PATCH: 560 PATCH PERCUTANEOUS; TOPICAL; TRANSDERMAL at 10:51

## 2023-02-23 RX ADMIN — LACTULOSE 10 G: 10 SOLUTION ORAL at 10:37

## 2023-02-23 RX ADMIN — LEVETIRACETAM 1000 MG: 100 SOLUTION ORAL at 21:37

## 2023-02-23 RX ADMIN — RIFAXIMIN 550 MG: 550 TABLET ORAL at 20:08

## 2023-02-23 RX ADMIN — GUAIFENESIN 600 MG: 600 TABLET, EXTENDED RELEASE ORAL at 21:36

## 2023-02-23 RX ADMIN — Medication 40 MG: at 17:04

## 2023-02-23 RX ADMIN — MIDODRINE HYDROCHLORIDE 10 MG: 5 TABLET ORAL at 17:03

## 2023-02-23 RX ADMIN — APIXABAN 5 MG: 5 TABLET, FILM COATED ORAL at 20:08

## 2023-02-23 RX ADMIN — OLANZAPINE 5 MG: 5 TABLET, ORALLY DISINTEGRATING ORAL at 21:36

## 2023-02-23 RX ADMIN — RIFAXIMIN 550 MG: 550 TABLET ORAL at 10:34

## 2023-02-23 RX ADMIN — TACROLIMUS 1 MG: 5 CAPSULE ORAL at 10:35

## 2023-02-23 RX ADMIN — LACOSAMIDE 100 MG: 10 SOLUTION ORAL at 10:34

## 2023-02-23 RX ADMIN — MIDODRINE HYDROCHLORIDE 10 MG: 5 TABLET ORAL at 10:34

## 2023-02-23 RX ADMIN — Medication 40 MG: at 10:36

## 2023-02-23 RX ADMIN — Medication 1 CAPSULE: at 10:34

## 2023-02-23 RX ADMIN — BARIUM SULFATE 20 ML: 400 SUSPENSION ORAL at 10:06

## 2023-02-23 RX ADMIN — Medication 50 MCG: at 10:34

## 2023-02-23 RX ADMIN — WHITE PETROLATUM: 1.75 OINTMENT TOPICAL at 10:37

## 2023-02-23 RX ADMIN — RAMELTEON 8 MG: 8 TABLET, FILM COATED ORAL at 20:08

## 2023-02-23 RX ADMIN — CALCIUM ACETATE 667 MG: 667 CAPSULE ORAL at 10:35

## 2023-02-23 RX ADMIN — APIXABAN 5 MG: 5 TABLET, FILM COATED ORAL at 10:35

## 2023-02-23 RX ADMIN — CALCIUM ACETATE 667 MG: 667 CAPSULE ORAL at 13:31

## 2023-02-23 RX ADMIN — MIDODRINE HYDROCHLORIDE 10 MG: 5 TABLET ORAL at 13:31

## 2023-02-23 RX ADMIN — CALCIUM ACETATE 667 MG: 667 CAPSULE ORAL at 17:03

## 2023-02-23 RX ADMIN — FOLIC ACID 1 MG: 1 TABLET ORAL at 10:35

## 2023-02-23 ASSESSMENT — ACTIVITIES OF DAILY LIVING (ADL)
ADLS_ACUITY_SCORE: 62
ADLS_ACUITY_SCORE: 63
ADLS_ACUITY_SCORE: 62
ADLS_ACUITY_SCORE: 63
ADLS_ACUITY_SCORE: 62
ADLS_ACUITY_SCORE: 63
ADLS_ACUITY_SCORE: 62
ADLS_ACUITY_SCORE: 63

## 2023-02-23 NOTE — PROGRESS NOTES
"[facility] - Repeat VFSS    02/23/23 1024   Appointment Info   Signing Clinician's Name / Credentials (SLP) Sheyla Mendoza mS CCC SLP   General Information   Onset of Illness/Injury or Date of Surgery 12/16/22   Referring Physician Rylie Jenkins MD   Patient/Family Therapy Goal Statement (SLP) None stated   Pertinent History of Current Problem Per provider note: \"Blanco Osborne is a 57 year old male admitted on 1/21/2023 as a transfer from [facility] for evaluation of loculated pleural effusion. Patient has multiple medical problems including history of liver transplant 2016 due to alcohol abuse, pAF on chronic anticoagulation, history of DM2, CVA, HTN, CHRSITIAN on BiPAP. 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 Discharge to [facility], back to [facility] and back to [facility] during month of December. Recently while at [facility] there were concerns for worsening effusions. He had thoracentesis 01/13 which returned exudative without growth on cultures. CT chest/abdomen/pelvis on 01/18 demonstrated worsening effusions and concerns for infected parapneumonic effusions with possible SBP.  CT findings discussed with Pulmonology team with 3 different pulmonologist including (Dr. Monge, Dr. Chu and Dr. Jay) with agreement recommended transfer to [facility] for consideration of chest tube placement and possible intrapleural lysis.   General Observations Pt is alert and agreeable to evaluation despite saying he is \"having a terrible day.\"   Type of Evaluation   Type of Evaluation Swallow Evaluation   General Swallowing Observations   Past History of Dysphagia See discharge summary 01/21/2023 for more details of his recent hx.\" Patient s/p tracheostomy (initially placed 11/29/2022); self-decannulated 2/2/2023 and now on 2L O2 via NC.  Multiple " "swallow evaluations completed since onset. Most recently, video swallow study 1/13/2023: \"Patient presents with moderate oropharyngeal dysphagia characterized by mildly decreased bolus prep and AP bolus transit, delayed swallow reflex and decreased pharyngeal constriction.  As result, demonstrated aspiration and penetration inconsistently with mildly thick and thin liquids.  Inconsistent cough reflex in response to aspiration. Appeared to be dependent on volume of bolus aspirated. Start diet of soft and bite sized and moderately thick liquids. Continues to be at risk for aspiration warranting ongoing analysis and intervention.\" Per most recent SLP session 1/20/2023: \"Recommend continue soft/bite size diet with moderately thick liquids (IDDSI 6, 3) given swallow strategies (fully upright position at 90 degrees, small bites/sips, slow pace). Patient needs assist for set up and at meals due to weakness/fatigue andconfusion. Demonstrates good potential for implementation of pharyngeal exercises. Adequate participation level despite lethargy, but needed encouragement. SLP to follow for dysphagia.\"   Swallowing Evaluation Videofluoroscopic swallow study (VFSS)   VFSS Evaluation   Radiologist Dr. Suggs   Views Taken right lateral   Physical Location of Procedure [facility]   VFSS Textures Trialed thin liquids;mildly thick liquids;pureed;solid foods   VFSS Eval: Thin Liquid Texture Trial   Mode of Presentation, Thin Liquid cup;spoon;straw;fed by clinician   Order of Presentation 12 13 14 23 24   Preparatory Phase WFL   Oral Phase, Thin Liquid premature pharyngeal entry   Bolus Location When Swallow Triggered pyriforms   Pharyngeal Phase, Thin Liquid impaired tongue base retraction;residue in vallecula;residue in pyriform sinus;impaired epiglottic movement;impaired hyolaryngeal excursion   Rosenbek's Penetration Aspiration Scale: Thin Liquid Trial Results 6 - contrast passes glottis, no subglottic residue remains (aspiration) "   Response to Aspiration absent response   Strategies and Compensations reduce bolus size   Diagnostic Statement One episode of silent aspiration when prompted to take consecutive sips. Single sips of thin liquids were notable for transient penetration.   VFSS Eval: Mildly Thick Liquids   Mode of Presentation cup;straw;self-fed   Order of Presentation 15 16 17 18 20 21 22   Preparatory Phase WFL   Oral Phase premature pharyngeal entry   Bolus Location When Swallow Triggered pyriforms   Pharyngeal Phase impaired epiglottic movement;impaired hyolaryngel excursion;residue in vallecula;residue in pyriform sinus   Rosenbek's Penetration Aspiration Scale 4 - contrast contacts vocal cords, no residue remains (penetration)   Strategies and Compensations reduce bolus size   Diagnostic Statement One episode of deep penetration, no additional penetration or aspiration. Reducing bolus size also reduces aspiration risk   VFSS Evaluation: Puree Solid Texture Trial   Mode of Presentation, Puree spoon;fed by clinician   Order of Presentation 17   Preparatory Phase WFL   Oral Phase, Puree premature pharyngeal entry   Bolus Location When Swallow Triggered valleculae   Pharyngeal Phase, Puree residue in pyriform sinus;residue in vallecula   Rosenbek's Penetration Aspiration Scale: Puree Food Trial Results 1 - no aspiration, contrast does not enter airway   Diagnostic Statement No penetration or aspiration. Mild amounts of oropharyngeal residue   VFSS Evaluation: Solid Food Texture Trial   Mode of Presentation, Solid fed by clinician   Order of Presentation 19   Preparatory Phase WFL   Oral Phase, Solid impaired AP movement;residue in oral cavity   Bolus Location When Swallow Triggered valleculae   Pharyngeal Phase, Solid residue in vallecula;residue in pyriform sinus   Rosenbek's Penetration Aspiration Scale: Solid Food Trial Results 1 - no aspiration, contrast does not enter airway   Diagnostic Statement No penetration or aspiration,  mild oropharyngeal residue   Esophageal Phase of Swallow   Patient reports or presents with symptoms of esophageal dysphagia No   Swallowing Recommendations   Diet Consistency Recommendations soft & bite-sized (level 6);mildly thick liquids (level 2)   Supervision Level for Intake 1:1 supervision needed   Mode of Delivery Recommendations bolus size, small;food moistened;slow rate of intake   Swallowing Maneuver Recommendations alternate food and liquid intake   Monitoring/Assistance Required (Eating/Swallowing) stop eating activities when fatigue is present;monitor for cough or change in vocal quality with intake   Recommended Feeding/Eating Techniques (Swallow Eval) maintain upright sitting position for eating;maintain upright posture during/after eating for 30 minutes;minimize distractions during oral intake;provide assist with feeding   Medication Administration Recommendations, Swallowing (SLP) In puree or with mildly thick liquids   Instrumental Assessment Recommendations   (Repeat VFSS warranted in 2-4 weeks pending clinical presentation and tolerance of free water protocol trials)   Clinical Impression   Criteria for Skilled Therapeutic Interventions Met (SLP Eval) Yes, treatment indicated   SLP Diagnosis Oropharyngeal dysphagia - improving but continues to be mild-moderately impaired   Risks & Benefits of therapy have been explained evaluation/treatment results reviewed;care plan/treatment goals reviewed;participants voiced agreement with care plan;participants included;patient   Clinical Impression Comments Pt seen for repeat VFSS evaluation. He was assessed with various textures/consistencies of barium under fluoroscopy with radiologist present. Pt had a single episdoe of silent aspiration with thin liquids during consecutive cup sips d/t premature spillage and impaired coordination - aspirated material appeared to clear upon completion of swallow. Single episdoe of deep penetration with mildly thick  liquids - otherwise no penetration or aspiration. Bolus size and single sips reduce risk for aspiration. Much improvement is noted from previous VFSS, however deficits persist in hyolaryngeal excursion, epiglottic inversion, and pharyngeal constriction. Mild-moderate oropharyngeal dysphagia. Recommend soft and bite sized diet (6) and mildly thick liquids (2) - small/single sips, straws ok if one sip at a time, complete upright position, slow rate of intake. SLP will continue to follow for diet tolerance, pharyngeal exercises, trials of the free water protocol (training for small sips), and repeat VFSS in 2-4 weeks pending presentation.   SLP Total Evaluation Time   Evaluation, videofluoroscopic eval of swallow function Minutes (30604) 58

## 2023-02-23 NOTE — PROGRESS NOTES
CLINICAL NUTRITION SERVICES - REASSESSMENT NOTE    Recommendations Ordered by Registered Dietitian (RD):   Daily at 6pm - please provide TF bolus after dinner at 80 mL/hr x3 hrs  Provide additional back up bolus at same rate after breakfast and lunch meal if consumes <50% of that meal   Malnutrition:    (1/22)  % Weight Loss:  > 5% in 1 month (severe malnutrition)  % Intake:  </= 50% for >/= 5 days (severe malnutrition)- suspected   Subcutaneous Fat Loss:  Orbital region moderate depletion, Upper arm region moderate-severe depletion and Thoracic region moderate-severe depletion  Muscle Loss:  Temporal region moderate depletion, Clavicle bone region severe depletion, Acromion bone region severe depletion, Patellar region moderate depletion and Anterior thigh region moderate-severe depletion  Fluid Retention:  Trace     Malnutrition Diagnosis: Severe malnutrition  In Context of:  Acute on Chronic illness or disease     EVALUATION OF PROGRESS TOWARD GOALS   Diet: Soft & Bite Sized  Liquidized/Moderately Thick    Supplements:   Gel + w/ lunch  Magic cup w/ dinner     Nutrition Support:   Type of Feeding Tube: G-tube  Enteral Frequency:  Continuous  Enteral Regimen: Novasource Renal at 80 mL/hr x 3 hours after dinner + PRN back up bolus for breakfast or lunch if consumes <50%.   Per Bolus Provisions: 240 mL formula = 480 kcal, 22 g protein, 44 g CHO, 0 g fiber and 172 mL free water  Free Water Flush: 100 mL before and after each feeding    Intake/Tolerance:   - Feeds self, fair appetite. Has been receiving nighttime bolus nightly since 2/20, however last night he had a small emesis. TF was stopped @ 1915. Unclear for how long the TF was run (estimate ~1 hr)  - Pt reports appetite is ok, he is still eating the two supplements sent (and often orders magic up in addition).     - Per charting, boluses have not been given for backup breakfast/lunch. 75% intake recorded for 2/21 breakfast (no lunch or dinner recorded), 50%  recorded for 2/22 lunch, 75% for 2/22 dinner (no breakfast recorded)    - Adequate meals are ordered TID.     - Stooling: titrating lactulose to 2-3 BM daily   - Labs: Na 135 (L). BUN 23.4 (H), Cr 2.4 (H) - HD.   Recent Labs   Lab 02/22/23  1312 02/21/23  0744 02/20/23  0801 02/17/23  0808   GLC 90 85 90 96     - Weight: measured 82.3 kg yesterday 2/22, lowest wt of admission but consistent with weight measured on 2/21 (82.4 kg)    ASSESSED NUTRITION NEEDS:  Dosing Weight 82.4 kg   Estimated Energy Needs: 8632-6830 kcals (30-35 Kcal/Kg)  Justification: repletion and HD  Estimated Protein Needs: + grams protein (1.2-1.5+ g pro/Kg)  Justification: Repletion and dialysis    NEW FINDINGS:   2/21 - had a fall.   On room air  2/23 - Planned Video Swallow w/ SLP    Previous Goals:   Patient will consume >50% meals 2-3x/day and receive at least 1 bolus feeding/day   Evaluation: Met based on records, though not all meals recorded.     Previous Nutrition Diagnosis:   Inadequate protein-energy intake related to variable PO and TF boluses provided as evidenced by meals ranging from % and no bolus documented for 3 days   Evaluation: Improving, continues below     CURRENT NUTRITION DIAGNOSIS  Inadequate oral intake related to variable PO w/ appetite and difficulty swallowing as evidenced by need for back up bolus 1x daily.     INTERVENTIONS  Recommendations / Nutrition Prescription  Daily at 6pm - please provide TF bolus after dinner 80 mL/hr x3 hrs  Provide additional back up bolus at same rate after breakfast and lunch meal if consumes <50% of that meal    Implementation  None new. Provided encouragement.     Goals  Patient will consume >50% meals 2-3x/day and receive at least 1 bolus feeding/day     MONITORING AND EVALUATION:  Progress towards goals will be monitored and evaluated per protocol and Practice Guidelines    Edna Osuna RD, LD  [facility], Orth[facility], [facility]  Pager: [phone]  Weekend Pager:  [phone]

## 2023-02-23 NOTE — PROGRESS NOTES
[facility]    Medicine Progress Note - Hospitalist Service    Date of Admission:  1/21/2023    Assessment & Plan   Blanco Osborne is a 57 year-old male admitted on 1/21/2023 as a transfer from [facility] for evaluation of loculated pleural effusion. He has extensive PMH including h/o liver transplant 2016 due to alcohol abuse, pAF on chronic anticoagulation, history of diabetes mellitus type 2, CVA, hypertension, CHRISTIAN on BiPAP.  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  He was recovering in [facility] and was transferred to [facility] for consideration of chest tube placement and possible intrapleural lysis for worsening effusion.    Acute metabolic encephalopathy with delirium, multifactorial  Hepatic encephalopathy  Chronic liver disease, history of liver transplant 2016  End-stage renal disease (ESRD), on hemodialysis (HD)  History of seizure, on Keppra and Vimpat  Waxing and waning mentation, coinciding with lactulose being held at [facility]  Earlier in hospital stay, remained confused and had pulled out tracheostomy tube, PICC line and pulled his dialysis catheter.  Lines replaced.  Palliative care consult 1/26, see note.  * Psychiatry consulted 2/2, see note, follow-up requested.    On/off fluctuating mentation, recently improving 2/17, 2/18    Continue to reorient as needed; maintain normal day/night, sleep wake cycles; minimize sedating/altering medications as able    Continue Lactulose to 10 mg BID, monitor for symptoms to have 2-3 Bms    Zyprexa 5 mg at bedtime scheduled.  Melatonin 3 mg p.o. at bedtime scheduled    Olanzapine BID as needed for severe agitation or anxiety; monitor for side effets    Mittens/sitter as needed; minimize use of restraints as able    Ongoing  hemodialysis, as per nephrology, on M-W-F dialysis schedule; ongoing nephrology consult follow-up appreciated    Dialysis labs as per nephrology    Vitamin D levelwas ordered per family request; nephrology managing and treating    Psychiatry consult follow-up requested, last visit 2/2, help appreciated    2/20/23Off restraints.Mentation has improved    2/21/23 -Fall this morningWorsening.Delirium Ramelteon started.Psyciatry consult appreciated    2/22/23 Improved mentation     Bilateral pleural effusions   Acute respiratory failure requiring tracheostomy  - resolved    Pneumonia  - resolved   ARDS  - resolved    Right pneumothorax - resolved   *Initial event was parainfluenza and strep pneumo pneumonia back in November 2022. Treated for ARDS. Had failed extubation x2 and tracheostomy performed on previous hospitalization. *Had additional complications of bilateral pneumothoraces as well as hydropneumothorax- pleural fluid grew candida parapsilosis  *Completed 4-week antifungal treatment. While in LTACH he was successfully weaned from the ventilator.  *Recently there were concern for worsening effusion while at [facility] and had thoracentesis 01/13 which returned exudative without growth on cultures. CT chest/abdomen/pelvis on 01/18 demonstrated worsening effusions and concerns for infected parapneumonic effusions with possible SBP.  Multiple pulmonologist at [facility] reviewed CT scan recommended transfer to [facility] for consideration of chest tube placement and possible intrapleural lysis  *Thoracic surgery (Dr. Jackson) consulted during admission   *CT chest 1/22, small effusions on imaging and so chest tube was not inserted.   ID consulted, see note 2/10.  *Patient pulled out his tracheostomy tube on the night 2/1-2/2 and is tolerating well without increased shortness of breath persistent cough or worsening hypoxia  * Chest x-ray 2/3 with cardiomegaly, chronic small bilateral pleural effusions, no pulmonary edema; right  "internal jugular dialysis catheter in place    Monitor respiratory status, recently stable on room air    Encourage incentive spirometry as able    Wound care previously consulted for tracheostomy site care, monitor for signs and symptoms of infection  2/20/23 Stable on RA  2/22/23 Continues to be on RA             S/p liver transplant 2016   Chronic immunosuppression, on tacrolimus  Cirrhosis with ascites  Subacute bacterial peritonitis (SBP), resolved  *Main manifestations of this are hepatic encephalopathy and ascites.  Hepatologist at [facility] felt that most likely reason for the cirrhosis was metabolic syndrome or alcohol intake.  There was no evidence of rejection on biopsy and there was some evidence of regeneration. Paracentesis done on 12/22/2022 showed lactobacillus indicating SBP, treated with Unasyn and Augmentin, finished 2-week course 1/12/2023.  Requires large-volume paracentesis periodically for recurring ascites.    *Paracentesis 1/13/2023 yielded about 7500 cc. Cultures NGTD.  Most recent paracentesis on 1/24 with 4.8 L fluid removal    Continue intermittent paracentesis as needed if develops symptomatic ascites    Monitor for signs and symptoms of recurrent spontaneous bacterial peritonitis     Further treatment for recurrent ascites with TIPS deferred to his Liver Transplant team and/or Hepatology, he has an appointment on 4/21/2023 with Hepatology, Dr. Simms    Continue Tacrolimus 1 mg BID.    Continue rifaximin 550 mg BID    Continue lactulose as ordered, titrate as needed to have 2-3 bms    GI consult as needed in hospital for new acute issues, otherwise as outpatient    Goals of care   As per prior rounding provider, \"on 1/25 nursing had mentioned that patient had refused to undergo dialysis and want to stop care, palliative care was consulted.  Patient and his spouse denied wanting to stop care and wanted ongoing restorative care including full code\"    Monitor, Full Code " status     Diarrhea, improved, on lactulose for chronic liver disease  - C difficile negative on 1/31. Secondary to lactulose.  - discontinued rectal tube (2/12) as stools more formed    Continue lactulose with goal of 2 to 3 soft bowel movement in 24 hours     Paroxysmal atrial fibrillation  Chronic anticoagulation, apixaban  Remains in sinus rhythm, on anticoagulation with apixaban indefinitely for stroke prophylaxis.      Monitor rhythm    Continue apixaban anticoagulation    Monitor hemoglobin, see below     Acute anemia  Pt has required intermittent transfusions for on-going anemia.  Anemia most likely secondary to critical illness/chronic disease, chronic renal disease.  Baseline hemoglobin around 7-8    Transfuse packed red blood cells to keep hemoglobin > 7    Hemoglobin 8.1 on 2/17    Epoetin lfa-epbx use as per nephrology    Hb 8.2 today     History PEA arrest   PEA arrest prior to his previous admission and 1 episode of PEA associated with desaturation on 12/20.  No structural cardiac disease.     Stable, continue cardiac Monitoring     Chronic Hypotension  In the past has developed baseline hypotension with cirrhosis and prolonged critical illness.  On hemodialysis.    Continue midodrine, as per nephrology      History of seizure   After hypoxic event, in the context of baseline multifactorial encephalopathy secondary to his ongoing critical illness and prior cardiac arrests and prior strokes and cirrhosis with hepatic encephalopathy. MRI of head of 12/20/22 reveals no PRES or leptomeningeal enhancement but scattered.  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   No recent seizure activity.    Continue levetiracetam, lacosamide     Seizure precautions    Outpatient follow-up with neurology, consult inpatient if needed    # ESRD  # Anemia due to renal disease  # Hypotension during dialysis  -Underwent dialysis  on 2/22/23and was [id number] and received  midodrine and albumin        Diabetes mellitus type 2  *Hemoglobin A1c on November 2022 was 4.7  *By report, on Lantus insulin in the past.  Blood sugar checks and sliding scale insulin previously discontinued as has been stable without insulin needs    Monitor with periodic blood sugar checks as needed     Severe malnutrition, protein and calorie type  Moderate dysphagia  G-tube feedings    Continue with tube feeds via PEG tube    IR replaced the PEG tube on 2/8/2023, monitor    Nutritionist consulted and following for tube feeds    SLP following as well    Oral diet as per speech and language therapy (SLP)     2/20 Nutrition following for tube feeds    -Hold tube feeds because of emesis episode on 2/22/23    Underwent videoswallow today      Anxiety  Fluoxetine was discontinued as per psychiatry recommendation on 2/2 (see consult note for details)     Stable, monitor; comfort and reassurance offered during visit    Psychiatry follow-up     Disposition     Estimated length of stay several days    Anticipated discharge to transitional care unit (TCU), long-term care, or LTACH; social work consulted, help appreciated; previously at [facility] and by report not able to return there (note 2/10)     Diet: Room Service  Adult Formula Bolus Feeding: Novasource Renal; Route: Gastrostomy; 3 times daily; Volume per Bolus: 240; mL(s); EVERYDAY please bolus at 80 mL/hr x3 hrs at 6pm. Add additional bolus feedings x2 at 80 mL/hr x 3 hrs as back up bolus feeding after br...  Snacks/Supplements Adult: Other; Gelatein+ with lunch and magic cup with dinner (RD); With Meals  Soft & Bite Sized Diet (level 6) Mildly Thick (level 2) (SMALL/SINGLE sips); No Straws    DVT Prophylaxis: DOAC  Nixon Catheter: Not present  Lines: PRESENT      CVC Double Lumen Right External jugular Tunneled-Site Assessment: WDL      Cardiac Monitoring: None  Code Status: Full Code      Clinically Significant Risk Factors              # Hypoalbuminemia:  Lowest albumin = 1.9 g/dL at 1/23/2023  6:38 AM, will monitor as appropriate            # Severe Malnutrition: based on nutrition assessment    Family Ofe and Brother Dylan updated on 2/21/23    Disposition Plan      Expected Discharge Date: 02/27/2023, 12:00 PM    Destination: inpatient rehabilitation facility  Discharge Comments: admitted on 1-21 from [place] [facility], Dialysis pt MWF. Will need placement          Ashkan Hernandez MD  Hospitalist Service  [facility]  Securely message with [employer] (more info)  Text page via [employer] Paging/Directory   ______________________________________________________________________    Interval History   Has been confused  Having 2-3 Bms   Mentation improving   Had 1 episode of vomiting last eveing and tube feeds stopped    WVital Signs: Temp: 97.6  F (36.4  C) Temp src: Oral BP: 115/72     Resp: 18 SpO2: 91 % O2 Device: None (Room air)    Weight: 181 lbs 7.02 oz    Physical Exam  Cardiovascular:      Rate and Rhythm: Normal rate and regular rhythm.      Heart sounds: Normal heart sounds.   Pulmonary:      Effort: Pulmonary effort is normal. No respiratory distress.   Abdominal:      General: There is distension.      Palpations: Abdomen is soft.   Musculoskeletal:      Right lower leg: No edema.      Left lower leg: No edema.   Neurological:      Mental Status: He is alert.       Medical Decision Making             Data         Imaging results reviewed over the past 24 hrs:   Recent Results (from the past 24 hour(s))   XR Video Swallow with SLP or OT    Narrative    VIDEO SWALLOWING EVALUATION   2/23/2023 10:08 AM     HISTORY: Dysphagia, trach history.    COMPARISON: None.    FLUOROSCOPY TIME: 1.4 minutes.     Number of cine runs: 13    FINDINGS:    The patient had an episode of a small amount of silent aspiration with  thin barium. The patient is available able to swallow mildly thickened  barium with intermittent penetration, but no aspiration.  Puree and  semisolid consistencies were swallowed without difficulty. Please see  the speech pathologist's note for additional details and  recommendations.    This study only includes the cervical esophagus.    SONIA SANDY MD         SYSTEM ID:  [id number]      Creatinine   Date Value Ref Range Status   02/22/2023 2.40 (H) 0.67 - 1.17 mg/dL Final   04/20/2020 1.06 0.66 - 1.25 mg/dL Final     Recent Labs   Lab 02/22/23  1025 02/21/23  0851 02/17/23  0808   HGB 8.2* 8.5* 8.1*

## 2023-02-23 NOTE — PLAN OF CARE
Goal Outcome Evaluation:       DATE & TIME: 2/23/23 (0600-2445)  Cognitive Concerns/ Orientation : Pt alert and oriented x2, forgetful. Disoriented to time/situation.    BEHAVIOR & AGGRESSION TOOL COLOR:calm and cooperative   ABNL VS/O2: VSS on RA  MOBILITY: Lift, fall risk, able to reposition self in bed at times, otherwise repositioning, restless at times, up in chair x2 this shift, PT worked with patient this afternoon.  PAIN MANAGMENT: Complained of back pain-Lidocaine patch applied, removed this afternoon per patient   DIET: Soft, bite sized with mildly thick liquids. Feeds himself- fair appetite. Bolus tube feeding given in evening scheduled at 6pm and PRN-talked to MD and stated to hold 6 pm dose today due to patient throwing up yesterday after tube feeding started, speech following and video swallow completed today, liquids changed to mildly thick from previous moderately thick. Encouraged patient to eat meals.  BOWEL/BLADDER: Continent of bowel, using bed pan during shift, BM x4 soft/loose, anuric  DRAIN/DEVICES: PIV SL. Right external jugular tunneled CVC. PEG tube, abdominal binder on  SKIN: Scattered scabs and bruises. Blanchable redness to coccyx and elbows. Mepilex on bilateral elbows.   D/C DATE: Discharge pending progress, placement  OTHER IMPORTANT INFO:  Psyche following.  All medications given via feeding tube or oral with thickened liquids.  Dialysis scheduled for tomorrow 2/24.

## 2023-02-23 NOTE — PROGRESS NOTES
Pt had small 50cc emesis with TF like consistency, 6pm TF bolus stopped d/t aspiration risk @ 1915.

## 2023-02-23 NOTE — PLAN OF CARE
DATE & TIME: 2/22/23 1723-8025  Cognitive Concerns/ Orientation : Pt alert and oriented x4, but forgetful. Does fluctuate greatly at times disoriented to time and situation   BEHAVIOR & AGGRESSION TOOL COLOR: Agitated and restless this am, PRN Zyprexa given, calm and cooperative this afternoon   ABNL VS/O2: VSS on RA, BP low during dialysis-Albumin given in dialysis   MOBILITY: Lift, fall risk, able to reposition self in bed, restless at times  PAIN MANAGMENT: Denied pain, Lidocaine patch fell off back found in bed, repositioning   DIET: Soft, bite sized with moderately thick liquids. Feeds himself- fair appetite. Bolus tube feeding given in evening scheduled at 6pm and PRN, speech following and plan to repeat video swallow tomorrow.   BOWEL/BLADDER: Incontinent of bowel, BM x1, anuric, dialysis today  DRAIN/DEVICES: PIV SL. Right external jugular tunneled CVC. PEG tube, abdominal binder on  SKIN: Scattered scabs and bruises. Blanchable redness to coccyx and elbows. Mepilex on bilateral elbows.   D/C DATE: Discharge pending progress  OTHER IMPORTANT INFO:  Psyche following.  All medications given via feeding tube or oral with thickened liquids.     Pt had emesis episode resulting in having to stop his TF, symptoms improved shortly after.

## 2023-02-23 NOTE — PLAN OF CARE
Goal Outcome Evaluation:       DATE & TIME: 2/22/23-2/23/23 4308-7131  Cognitive Concerns/ Orientation : Pt alert and oriented x4, but forgetful. Does fluctuate greatly at times disoriented to time and situation   BEHAVIOR & AGGRESSION TOOL COLOR:calm and cooperative this evening  ABNL VS/O2: VSS on RA,pt refused VS overnight (O2 stable 92%)  MOBILITY: Lift, fall risk, able to reposition self in bed, restless at times  PAIN MANAGMENT: Complained of back pain-tylenol x1   DIET: Soft, bite sized with moderately thick liquids. Feeds himself- fair appetite. Bolus tube feeding given in evening scheduled at 6pm and PRN, speech following and plan to repeat video swallow tomorrow.   BOWEL/BLADDER: Incontinent of bowel, anuric, dialysis today  DRAIN/DEVICES: PIV SL. Right external jugular tunneled CVC. PEG tube, abdominal binder on  SKIN: Scattered scabs and bruises. Blanchable redness to coccyx and elbows. Mepilex on bilateral elbows.   D/C DATE: Discharge pending progress  OTHER IMPORTANT INFO:  Psyche following.  All medications given via feeding tube or oral with thickened liquids.     Pt had emesis episode resulting in having to stop his TF in the evening, symptoms improved shortly after and have since been stable.

## 2023-02-23 NOTE — PLAN OF CARE
Goal Outcome Evaluation:      Plan of Care Reviewed With: patient    Overall Patient Progress: no changeOverall Patient Progress: no change    Outcome Evaluation: Pt reports good appetite, likes the supplements sent. he is on his way down to video swallow w/ SLP. For now, continue evening bolus and back up boluses for breakfast/lunch as needed.

## 2023-02-24 LAB — GLUCOSE BLDC GLUCOMTR-MCNC: 91 MG/DL (ref 70–99)

## 2023-02-24 PROCEDURE — 250N000009 HC RX 250: Performed by: STUDENT IN AN ORGANIZED HEALTH CARE EDUCATION/TRAINING PROGRAM

## 2023-02-24 PROCEDURE — 634N000001 HC RX 634: Performed by: INTERNAL MEDICINE

## 2023-02-24 PROCEDURE — 250N000013 HC RX MED GY IP 250 OP 250 PS 637: Performed by: STUDENT IN AN ORGANIZED HEALTH CARE EDUCATION/TRAINING PROGRAM

## 2023-02-24 PROCEDURE — 99232 SBSQ HOSP IP/OBS MODERATE 35: CPT | Performed by: STUDENT IN AN ORGANIZED HEALTH CARE EDUCATION/TRAINING PROGRAM

## 2023-02-24 PROCEDURE — 99232 SBSQ HOSP IP/OBS MODERATE 35: CPT | Performed by: INTERNAL MEDICINE

## 2023-02-24 PROCEDURE — 258N000003 HC RX IP 258 OP 636: Performed by: INTERNAL MEDICINE

## 2023-02-24 PROCEDURE — 250N000013 HC RX MED GY IP 250 OP 250 PS 637: Performed by: INTERNAL MEDICINE

## 2023-02-24 PROCEDURE — 250N000011 HC RX IP 250 OP 636: Performed by: INTERNAL MEDICINE

## 2023-02-24 PROCEDURE — 250N000012 HC RX MED GY IP 250 OP 636 PS 637: Performed by: STUDENT IN AN ORGANIZED HEALTH CARE EDUCATION/TRAINING PROGRAM

## 2023-02-24 PROCEDURE — 250N000013 HC RX MED GY IP 250 OP 250 PS 637

## 2023-02-24 PROCEDURE — 250N000013 HC RX MED GY IP 250 OP 250 PS 637: Performed by: FAMILY MEDICINE

## 2023-02-24 PROCEDURE — 90937 HEMODIALYSIS REPEATED EVAL: CPT

## 2023-02-24 PROCEDURE — 120N000001 HC R&B MED SURG/OB

## 2023-02-24 PROCEDURE — P9045 ALBUMIN (HUMAN), 5%, 250 ML: HCPCS | Performed by: INTERNAL MEDICINE

## 2023-02-24 RX ADMIN — TACROLIMUS 1 MG: 5 CAPSULE ORAL at 01:08

## 2023-02-24 RX ADMIN — RIFAXIMIN 550 MG: 550 TABLET ORAL at 20:13

## 2023-02-24 RX ADMIN — OLANZAPINE 5 MG: 5 TABLET, ORALLY DISINTEGRATING ORAL at 20:13

## 2023-02-24 RX ADMIN — LACOSAMIDE 100 MG: 10 SOLUTION ORAL at 13:34

## 2023-02-24 RX ADMIN — GUAIFENESIN 600 MG: 600 TABLET, EXTENDED RELEASE ORAL at 20:10

## 2023-02-24 RX ADMIN — APIXABAN 5 MG: 5 TABLET, FILM COATED ORAL at 07:44

## 2023-02-24 RX ADMIN — TACROLIMUS 1 MG: 5 CAPSULE ORAL at 07:45

## 2023-02-24 RX ADMIN — MIDODRINE HYDROCHLORIDE 10 MG: 5 TABLET ORAL at 07:44

## 2023-02-24 RX ADMIN — Medication 40 MG: at 17:42

## 2023-02-24 RX ADMIN — CALCIUM ACETATE 667 MG: 667 CAPSULE ORAL at 07:57

## 2023-02-24 RX ADMIN — SODIUM CHLORIDE 300 ML: 9 INJECTION, SOLUTION INTRAVENOUS at 10:00

## 2023-02-24 RX ADMIN — HEPARIN SODIUM 1900 UNITS: 1000 INJECTION INTRAVENOUS; SUBCUTANEOUS at 12:51

## 2023-02-24 RX ADMIN — LEVETIRACETAM 1000 MG: 100 SOLUTION ORAL at 20:22

## 2023-02-24 RX ADMIN — MIDODRINE HYDROCHLORIDE 10 MG: 5 TABLET ORAL at 17:42

## 2023-02-24 RX ADMIN — CALCIUM ACETATE 667 MG: 667 CAPSULE ORAL at 13:32

## 2023-02-24 RX ADMIN — WHITE PETROLATUM: 1.75 OINTMENT TOPICAL at 13:34

## 2023-02-24 RX ADMIN — Medication: at 10:35

## 2023-02-24 RX ADMIN — APIXABAN 5 MG: 5 TABLET, FILM COATED ORAL at 20:13

## 2023-02-24 RX ADMIN — FOLIC ACID 1 MG: 1 TABLET ORAL at 07:44

## 2023-02-24 RX ADMIN — HEPARIN SODIUM 1900 UNITS: 1000 INJECTION INTRAVENOUS; SUBCUTANEOUS at 12:50

## 2023-02-24 RX ADMIN — CALCIUM ACETATE 667 MG: 667 CAPSULE ORAL at 17:42

## 2023-02-24 RX ADMIN — RIFAXIMIN 550 MG: 550 TABLET ORAL at 07:44

## 2023-02-24 RX ADMIN — ALBUMIN HUMAN 250 ML: 0.05 INJECTION, SOLUTION INTRAVENOUS at 09:45

## 2023-02-24 RX ADMIN — Medication 1 CAPSULE: at 13:32

## 2023-02-24 RX ADMIN — ACETAMINOPHEN 650 MG: 325 TABLET, FILM COATED ORAL at 08:30

## 2023-02-24 RX ADMIN — EPOETIN ALFA-EPBX 20000 UNITS: 10000 INJECTION, SOLUTION INTRAVENOUS; SUBCUTANEOUS at 12:45

## 2023-02-24 RX ADMIN — RAMELTEON 8 MG: 8 TABLET, FILM COATED ORAL at 20:13

## 2023-02-24 RX ADMIN — GUAIFENESIN 600 MG: 600 TABLET, EXTENDED RELEASE ORAL at 07:57

## 2023-02-24 RX ADMIN — MIDODRINE HYDROCHLORIDE 10 MG: 5 TABLET ORAL at 13:32

## 2023-02-24 RX ADMIN — LACOSAMIDE 100 MG: 10 SOLUTION ORAL at 01:08

## 2023-02-24 RX ADMIN — Medication 40 MG: at 07:45

## 2023-02-24 RX ADMIN — Medication 50 MCG: at 07:45

## 2023-02-24 ASSESSMENT — ACTIVITIES OF DAILY LIVING (ADL)
ADLS_ACUITY_SCORE: 62

## 2023-02-24 NOTE — PROGRESS NOTES
Inpatient Dialysis Progress Note            Assessment and Plan:       Following ARF without recovery/current dialysis dependence      1) End Stage Kidney Disease (CKD and subsequent ATN with non-recovery)  Initially LORETTA, but no renal recovery (anuric/oliguric), now considered ESRD. Chronic MWF HD via CVC, 3 hr runs.    CVC function much better today.    BP better with albumin prime in addition to midodrine.    CVC flow better as a result.       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  PTH is below target.  Hard to get this to increase.  Vit D also below target.  On Vit D 2000 units daily.  Phos 6.7.  Ca Ac ordered.       2) Anemia:  EPO 20 K units.   persistent anemia despite adequate iron stores and high epo dosing suggesting some epo resistance.      3) disposition:   Not returning to LTACH. In case not able to go to inpatient rehab, would consider [facility] for TCU/rehab and dialysis. He is not currently a candidate for outpt HD.       4) Diffuse pruritis   Pruritis might be related to his renal disease. Trial urea cream.       Other:  Hx liver transplant 2016  Confusion/encephlopathy  DM  AMS, delirium        Plan/Recs:  1) HD today, continue MWF schedule  2) Agree that In current state not outpatient dialysis candidate   3) Support BP with albumin 5% prime.             Interval History:     More alert today.  BP better with 5% albumin prime.  He complains mostly of abdominal pain - cause uncertain.         Dialysis Parameters:     Wt Readings from Last 4 Encounters:   02/22/23 82.3 kg (181 lb 7 oz)   01/21/23 82.4 kg (181 lb 11.2 oz)   12/28/22 96 kg (211 lb 10.3 oz)   12/16/22 100.2 kg (221 lb)     I/O last 3 completed shifts:  In: 490 [P.O.:390; NG/GT:100]  Out: -   BP Readings from Last 3 Encounters:   02/24/23 100/67   01/21/23 120/71   12/29/22 (!) 139/94       Routine, ONE TIME, Starting today For 1 Occurrences  Weight Loss (kg): 1  Dialysis Temp: 36.5  C  Access Device: CVC  Access Site:  R IJ  Dialyzer: Revaclear  Dialysis Bath: K 3  Blood Flow Rate (mL/min): 400  Total Treatment Time (hrs): 3  Heparin: no         Medications and Allergies:   Reviewed in EPIC      - MEDICATION INSTRUCTIONS for Dialysis Patients -   Does not apply See Admin Instructions     sodium chloride 0.9%  300 mL Hemodialysis Machine Once     albumin human  250 mL Intravenous Once in dialysis/CRRT     apixaban ANTICOAGULANT  5 mg Oral BID     calcium acetate (phos binder)  667 mg Oral TID w/meals     epoetin mirta-epbx  20,000 Units Intravenous Once     folic acid  1 mg Oral or Feeding Tube Daily     guaiFENesin  600 mg Oral BID     sodium chloride (PF) 0.9%  10 mL Intracatheter Once in dialysis/CRRT    Followed by     heparin  1.3-2.6 mL Intracatheter Once in dialysis/CRRT     sodium chloride (PF) 0.9%  10 mL Intracatheter Once in dialysis/CRRT    Followed by     heparin  1.3-2.6 mL Intracatheter Once in dialysis/CRRT     lacosamide  100 mg Oral or Feeding Tube BID     lactulose  10 g Oral BID     levETIRAcetam  1,000 mg Oral or Feeding Tube Q24H     lidocaine  1 patch Transdermal Q24H     lidocaine   Transdermal Q8H BIJU     midodrine  10 mg Oral or Feeding Tube TID w/meals     mineral oil-hydrophilic petrolatum   Topical Daily     multivitamin RENAL  1 capsule Oral Daily     - MEDICATION INSTRUCTIONS -   Does not apply Once     nystatin  500,000 Units Swish & Spit 4x Daily     OLANZapine zydis  5 mg Oral At Bedtime     pantoprazole  40 mg Oral or Feeding Tube BID AC     ramelteon  8 mg Oral QPM     rifaximin  550 mg Oral or Feeding Tube BID     sodium chloride (PF)  10-30 mL Intracatheter Q8H     sodium chloride (PF)  3 mL Intracatheter Q8H     sodium chloride (PF)  9 mL Intracatheter During Dialysis/CRRT (from stock)     sodium chloride (PF)  9 mL Intracatheter During Dialysis/CRRT (from stock)     tacrolimus  1 mg Oral BID IS     cholecalciferol  50 mcg Oral Daily     sodium chloride 0.9%, acetaminophen **OR** acetaminophen,  acetylcysteine, albuterol, alteplase, alteplase, calcium carbonate, artificial tears, glucose **OR** dextrose **OR** glucagon, ipratropium - albuterol 0.5 mg/2.5 mg/3 mL, lidocaine 4%, lidocaine (buffered or not buffered), melatonin, mineral oil-hydrophilic petrolatum, naloxone **OR** naloxone **OR** naloxone **OR** naloxone, nitroGLYcerin, OLANZapine zydis, ondansetron **OR** ondansetron, simethicone, sodium chloride (PF), sodium chloride (PF), sodium chloride (PF), urea   No Known Allergies           Labs:     BMP  Recent Labs   Lab 02/24/23  0921 02/22/23  1312 02/21/23  0744 02/20/23  0801   NA  --  135*  --  134*   POTASSIUM  --  3.5  --  5.0   CHLORIDE  --  98  --  95*   KELLY  --  8.6  --  9.0   CO2  --  28  --  29   BUN  --  23.4*  --  55.0*   CR  --  2.40*  --  4.58*   GLC 91 90 85 90     CBC  Recent Labs   Lab 02/22/23  1025 02/21/23  0851   WBC  --  4.6   HGB 8.2* 8.5*   HCT  --  28.5*   MCV  --  101*   PLT  --  129*     Lab Results   Component Value Date    AST 37 02/17/2023    ALT 9 (L) 02/17/2023    ALKPHOS 216 (H) 02/17/2023    BILITOTAL 0.4 02/17/2023    CHANDLER 29 02/14/2023            Physical Exam:   Vitals were reviewed in [facility]    Wt Readings from Last 3 Encounters:   02/22/23 82.3 kg (181 lb 7 oz)   01/21/23 82.4 kg (181 lb 11.2 oz)   12/28/22 96 kg (211 lb 10.3 oz)       Intake/Output Summary (Last 24 hours) at 2/20/2023 1012  Last data filed at 2/19/2023 1850  Gross per 24 hour   Intake 240 ml   Output --   Net 240 ml       GENERAL APPEARANCE: pleasant, no distress, awake  HEENT:  Eyes/ears/nose grossly normal, neck supple  RESP: lungs clear to auscultation with good efforts, no crackles, rhonchi or wheezes  CV: regular rate and rhythm, normal S1 S2, no murmur, click or rub   ABDOMEN: soft, diffuse mild tenderness, no guarding    EXTREMITIES/SKIN: no edema, no rashes or lesions     Pt seen on dialysis.  Stable run.  Good BFR.      Attestation:  I have reviewed today's vital signs, notes,  medications, labs and imaging.     Kb Moulton MD  [facility] - Nephrology  [phone]

## 2023-02-24 NOTE — PLAN OF CARE
Goal Outcome Evaluation:       DATE & TIME: 2/24/23 ([id number])  Cognitive Concerns/ Orientation : Pt alert and oriented x3, forgetful. Disoriented to situation, intermittent confusion   BEHAVIOR & AGGRESSION TOOL COLOR:calm and cooperative   ABNL VS/O2: VSS on RA  MOBILITY: Lift, fall risk, repositioning, up in chair with lift.  PAIN MANAGMENT: Back pain, tylenol given, declined Lidocaine patch, repositioning   DIET: Soft, bite sized with mildly thick liquids. Feeds himself- fair appetite. Bolus tube feeding PRN-patient declined feedings today, speech following-appetite fair today.   BOWEL/BLADDER: Continent of bowel, using bed pan or commode during shift, BM x4 soft, anuric, Lactulose held this am.  DRAIN/DEVICES: PIV SL. Right external jugular tunneled CVC. PEG tube, abdominal binder on  SKIN: Scattered scabs and bruises. Blanchable redness to coccyx and elbows. Mepilex on bilateral elbows.   D/C DATE: Discharge pending progress, placement  OTHER IMPORTANT INFO:  Psyche following.  All medications given via feeding tube or oral with thickened liquids.  Patient had Dialysis today.  Brother and father at bedside this afternoon.

## 2023-02-24 NOTE — PROGRESS NOTES
[facility]    Medicine Progress Note - Hospitalist Service    Date of Admission:  1/21/2023    Assessment & Plan   Blanco Osborne is a 57 year-old male admitted on 1/21/2023 as a transfer from [facility] for evaluation of loculated pleural effusion. He has extensive PMH including h/o liver transplant 2016 due to alcohol abuse, pAF on chronic anticoagulation, history of diabetes mellitus type 2, CVA, hypertension, CHRISTIAN on BiPAP.  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  He was recovering in [facility] and was transferred to [facility] for consideration of chest tube placement and possible intrapleural lysis for worsening effusion.    Acute metabolic encephalopathy with delirium, multifactorial  Hepatic encephalopathy  Chronic liver disease, history of liver transplant 2016  End-stage renal disease (ESRD), on hemodialysis (HD)  History of seizure, on Keppra and Vimpat  Waxing and waning mentation, coinciding with lactulose being held at [facility]  Earlier in hospital stay, remained confused and had pulled out tracheostomy tube, PICC line and pulled his dialysis catheter.  Lines replaced.  Palliative care consult 1/26, see note.  * Psychiatry consulted 2/2, see note, follow-up requested.    On/off fluctuating mentation, recently improving 2/17, 2/18    Continue to reorient as needed; maintain normal day/night, sleep wake cycles; minimize sedating/altering medications as able    Continue Lactulose to 10 mg BID, monitor for symptoms to have 2-3 Bms    Zyprexa 5 mg at bedtime scheduled.  Melatonin 3 mg p.o. at bedtime scheduled    Olanzapine BID as needed for severe agitation or anxiety; monitor for side effets    Mittens/sitter as needed; minimize use of restraints as able    Ongoing  hemodialysis, as per nephrology, on M-W-F dialysis schedule; ongoing nephrology consult follow-up appreciated    Dialysis labs as per nephrology    Vitamin D levelwas ordered per family request; nephrology managing and treating    Psychiatry consult follow-up requested, last visit 2/2, help appreciated    2/20/23Off restraints.Mentation has improved    2/21/23 -Fall this morningWorsening.Delirium Ramelteon started.Psyciatry consult appreciated  2/22/23 Improved mentation  Continue Ramleteon  Reynaldo repeat Vitamin D level as per family request      Bilateral pleural effusions   Acute respiratory failure requiring tracheostomy  - resolved    Pneumonia  - resolved   ARDS  - resolved    Right pneumothorax - resolved   *Initial event was parainfluenza and strep pneumo pneumonia back in November 2022. Treated for ARDS. Had failed extubation x2 and tracheostomy performed on previous hospitalization. *Had additional complications of bilateral pneumothoraces as well as hydropneumothorax- pleural fluid grew candida parapsilosis  *Completed 4-week antifungal treatment. While in LTACH he was successfully weaned from the ventilator.  *Recently there were concern for worsening effusion while at [facility] and had thoracentesis 01/13 which returned exudative without growth on cultures. CT chest/abdomen/pelvis on 01/18 demonstrated worsening effusions and concerns for infected parapneumonic effusions with possible SBP.  Multiple pulmonologist at [facility] reviewed CT scan recommended transfer to [facility] for consideration of chest tube placement and possible intrapleural lysis  *Thoracic surgery (Dr. Jackson) consulted during admission   *CT chest 1/22, small effusions on imaging and so chest tube was not inserted.   ID consulted, see note 2/10.  *Patient pulled out his tracheostomy tube on the night 2/1-2/2 and is tolerating well without increased shortness of breath persistent cough or worsening hypoxia  * Chest x-ray 2/3 with cardiomegaly,  "chronic small bilateral pleural effusions, no pulmonary edema; right internal jugular dialysis catheter in place    Monitor respiratory status, recently stable on room air    Encourage incentive spirometry as able    Wound care previously consulted for tracheostomy site care, monitor for signs and symptoms of infection  2/20/23 Stable on RA  2/22/23 Continues to be on RA             S/p liver transplant 2016   Chronic immunosuppression, on tacrolimus  Cirrhosis with ascites  Subacute bacterial peritonitis (SBP), resolved  *Main manifestations of this are hepatic encephalopathy and ascites.  Hepatologist at [facility] felt that most likely reason for the cirrhosis was metabolic syndrome or alcohol intake.  There was no evidence of rejection on biopsy and there was some evidence of regeneration. Paracentesis done on 12/22/2022 showed lactobacillus indicating SBP, treated with Unasyn and Augmentin, finished 2-week course 1/12/2023.  Requires large-volume paracentesis periodically for recurring ascites.    *Paracentesis 1/13/2023 yielded about 7500 cc. Cultures NGTD.  Most recent paracentesis on 1/24 with 4.8 L fluid removal    Continue intermittent paracentesis as needed if develops symptomatic ascites    Monitor for signs and symptoms of recurrent spontaneous bacterial peritonitis     Further treatment for recurrent ascites with TIPS deferred to his Liver Transplant team and/or Hepatology, he has an appointment on 4/21/2023 with Hepatology, Dr. Simms    Continue Tacrolimus 1 mg BID.    Continue rifaximin 550 mg BID    Continue lactulose as ordered, titrate as needed to have 2-3 bms    GI consult as needed in hospital for new acute issues, otherwise as outpatient    Goals of care   As per prior rounding provider, \"on 1/25 nursing had mentioned that patient had refused to undergo dialysis and want to stop care, palliative care was consulted.  Patient and his spouse denied wanting to stop care and wanted ongoing " "restorative care including full code\"    Monitor, Full Code status     Diarrhea, improved, on lactulose for chronic liver disease  - C difficile negative on 1/31. Secondary to lactulose.  - discontinued rectal tube (2/12) as stools more formed    Continue lactulose with goal of 2 to 3 soft bowel movement in 24 hours     Paroxysmal atrial fibrillation  Chronic anticoagulation, apixaban  Remains in sinus rhythm, on anticoagulation with apixaban indefinitely for stroke prophylaxis.      Monitor rhythm    Continue apixaban anticoagulation    Monitor hemoglobin, see below     Acute anemia  Pt has required intermittent transfusions for on-going anemia.  Anemia most likely secondary to critical illness/chronic disease, chronic renal disease.  Baseline hemoglobin around 7-8    Transfuse packed red blood cells to keep hemoglobin > 7    Hemoglobin 8.1 on 2/17    Epoetin lfa-epbx use as per nephrology    Hb 8.2 today     History PEA arrest   PEA arrest prior to his previous admission and 1 episode of PEA associated with desaturation on 12/20.  No structural cardiac disease.     Stable, continue cardiac Monitoring     Chronic Hypotension  In the past has developed baseline hypotension with cirrhosis and prolonged critical illness.  On hemodialysis.  Receiving midodrine and albumin during dailysis    Continue midodrine, as per nephrology      History of seizure   After hypoxic event, in the context of baseline multifactorial encephalopathy secondary to his ongoing critical illness and prior cardiac arrests and prior strokes and cirrhosis with hepatic encephalopathy. MRI of head of 12/20/22 reveals no PRES or leptomeningeal enhancement but scattered.  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   No recent seizure activity.    Continue levetiracetam, lacosamide     Seizure precautions    Outpatient follow-up with neurology, consult inpatient if needed    # ESRD  # Anemia due to " renal disease  # Hypotension during dialysis  -Underwent dialysis  on 2/22/23and was [id number] and received midodrine and albumin        Diabetes mellitus type 2  *Hemoglobin A1c on November 2022 was 4.7  *By report, on Lantus insulin in the past.  Blood sugar checks and sliding scale insulin previously discontinued as has been stable without insulin needs    Monitor with periodic blood sugar checks as needed     Severe malnutrition, protein and calorie type  Moderate dysphagia  G-tube feedings    Continue with tube feeds via PEG tube    IR replaced the PEG tube on 2/8/2023, monitor    Nutritionist consulted and following for tube feeds    SLP following as well    Oral diet as per speech and language therapy (SLP)     2/20 Nutrition following for tube feeds    -Hold tube feeds because of emesis episode on 2/22/23    Underwent videoswallow today      Anxiety  Fluoxetine was discontinued as per psychiatry recommendation on 2/2 (see consult note for details)     Stable, monitor; comfort and reassurance offered during visit    Psychiatry follow-up     Disposition     Estimated length of stay several days    Anticipated discharge to transitional care unit (TCU), long-term care, or LTACH; social work consulted, help appreciated; previously at [facility] and by report not able to return there (note 2/10)     Diet: Room Service  Adult Formula Bolus Feeding: Novasource Renal; Route: Gastrostomy; 3 times daily; Volume per Bolus: 240; mL(s); EVERYDAY please bolus at 80 mL/hr x3 hrs at 6pm. Add additional bolus feedings x2 at 80 mL/hr x 3 hrs as back up bolus feeding after br...  Snacks/Supplements Adult: Other; Gelatein+ with lunch and magic cup with dinner (RD); With Meals  Soft & Bite Sized Diet (level 6) Mildly Thick (level 2) (SMALL/SINGLE sips); No Straws    DVT Prophylaxis: DOAC  Nixon Catheter: Not present  Lines: PRESENT      CVC Double Lumen Right External jugular Tunneled-Site Assessment: WDL      Cardiac  Monitoring: None  Code Status: Full Code      Clinically Significant Risk Factors              # Hypoalbuminemia: Lowest albumin = 1.9 g/dL at 1/23/2023  6:38 AM, will monitor as appropriate            # Severe Malnutrition: based on nutrition assessment    Family Ofe and Brother Dylan updated on 2/21/23    Disposition Plan      Expected Discharge Date: 02/27/2023, 12:00 PM    Destination: inpatient rehabilitation facility  Discharge Comments: admitted on 1-21 from [facility], Dialysis pt MWF. Will need placement          Ashkan Hernandez MD  Hospitalist Service  [facility]  Securely message with [employer] (more info)  Text page via [employer] Paging/Directory   ______________________________________________________________________    Interval History   Mentation improving  Underwent dialysis  Brother at bedside   Has been intermittently confused     WVital Signs: Temp: 97.8  F (36.6  C) Temp src: Oral BP: 98/63 Pulse: 91   Resp: 18 SpO2: 98 % O2 Device: None (Room air)    Weight: 181 lbs 7.02 oz    Physical Exam  Cardiovascular:      Rate and Rhythm: Normal rate and regular rhythm.      Heart sounds: Normal heart sounds.   Pulmonary:      Effort: Pulmonary effort is normal. No respiratory distress.   Abdominal:      General: There is distension.      Palpations: Abdomen is soft.   Musculoskeletal:      Right lower leg: No edema.      Left lower leg: No edema.   Neurological:      Mental Status: He is alert.      Comments: Intermittent confusion       Medical Decision Making             Data         Imaging results reviewed over the past 24 hrs:   No results found for this or any previous visit (from the past 24 hour(s)).   Creatinine   Date Value Ref Range Status   02/22/2023 2.40 (H) 0.67 - 1.17 mg/dL Final   04/20/2020 1.06 0.66 - 1.25 mg/dL Final     Recent Labs   Lab 02/22/23  1025 02/21/23  0851   HGB 8.2* 8.5*

## 2023-02-24 NOTE — PROGRESS NOTES
Potassium   Date Value Ref Range Status   02/22/2023 3.5 3.4 - 5.3 mmol/L Final   12/29/2022 3.4 3.4 - 5.3 mmol/L Final   04/20/2020 3.9 3.4 - 5.3 mmol/L Final     Potassium POCT   Date Value Ref Range Status   01/19/2023 3.6 3.5 - 5.0 mmol/L Final     Hemoglobin   Date Value Ref Range Status   02/22/2023 8.2 (L) 13.3 - 17.7 g/dL Final   04/20/2020 14.3 13.3 - 17.7 g/dL Final     Creatinine   Date Value Ref Range Status   02/22/2023 2.40 (H) 0.67 - 1.17 mg/dL Final   04/20/2020 1.06 0.66 - 1.25 mg/dL Final     Urea Nitrogen   Date Value Ref Range Status   02/22/2023 23.4 (H) 6.0 - 20.0 mg/dL Final   12/29/2022 46 (H) 7 - 30 mg/dL Final   04/20/2020 23 7 - 30 mg/dL Final     Sodium   Date Value Ref Range Status   02/22/2023 135 (L) 136 - 145 mmol/L Final   04/20/2020 139 133 - 144 mmol/L Final     INR   Date Value Ref Range Status   12/21/2022 1.36 (H) 0.85 - 1.15 Final   10/07/2019 1.20 (H) 0.86 - 1.14 Final       DIALYSIS PROCEDURE NOTE  Hepatitis status of previous patient on machine log was checked and verified ok to use with this patients hepatitis status.  Patient dialyzed for 3 hrs. on a K3 bath with a net fluid removal of  1L.  A BFR of 400 ml/min was obtained via a R tunneled CVC.      The treatment plan was discussed with Dr. Moulton during the treatment.    Total heparin received during the treatment: 0 units.     Line flushed, clamped and capped with heparin 1:1000 1.9 mL (1900 units) per lumen    Meds  given: EPO, Albumin prime   Complications: None      Person educated: Patient. Knowledge base limited. Barriers to learning: cognition. Educated on procedure via verbal mode. Patient verbalized understanding.   ICEBOAT? Timeout performed pre-treatment  I: Patient was identified using 2 identifiers  C:  Consent Signed Yes  E: Equipment preventative maintenance is current and dialysis delivery system OK to use  B:    Latest Reference Range & Units 02/02/23 06:18   Hep B Surface Agn Nonreactive  Nonreactive    Hepatitis B Core Angela Nonreactive  Nonreactive   Hepatitis B Surface Antibody Instrument Value <8.00 m[IU]/mL 0.54   Hepatitis B Surface Antibody  Nonreactive   O: Dialysis orders present and complete prior to treatment  A: Vascular access verified and assessed prior to treatment  T: Treatment was performed at a clinically appropriate time  ?: Patient was allowed to ask questions and address concerns prior to treatment  See Adult Hemodialysis flowsheet in EPIC for further details and post assessment.  Machine water alarm in place and functioning. Transducer pods intact and checked every 15min.   Pt assisted with repositioning throughout dialysis treatment.  Pt returned via bed.  Chlorine/Chloramine water system checked every 4 hours.    Post treatment report given to PETER Camacho regarding 1L of fluid removed, last /70.    Karina Nixon RN

## 2023-02-24 NOTE — PLAN OF CARE
DATE & TIME: 2/23/23 [id number]  Cognitive Concerns/ Orientation : Pt alert and oriented x2, forgetful. Disoriented to time/situation.    BEHAVIOR & AGGRESSION TOOL COLOR:calm and cooperative   ABNL VS/O2: VSS on RA  MOBILITY: Lift, fall risk, able to reposition self in bed at times, otherwise repositioning, restless at times  PAIN MANAGMENT: Complained of back pain-Lidocaine patch applied  DIET: Soft, bite sized with mildly thick liquids. Feeds himself- fair appetite. Bolus tube feeding given in evening scheduled at 6pm and PRN, speech following and video swallow completed today, liquids changed to mildly thick from previous moderately thick.  BOWEL/BLADDER: Continent of bowel, using bed pan during shift, BM x2 soft, anuric  DRAIN/DEVICES: PIV SL. Right external jugular tunneled CVC. PEG tube, abdominal binder on  SKIN: Scattered scabs and bruises. Blanchable redness to coccyx and elbows. Mepilex on bilateral elbows.   D/C DATE: Discharge pending progress, placement  OTHER IMPORTANT INFO:  Psyche following.  All medications given via feeding tube or oral with thickened liquids.  Dialysis scheduled for tomorrow 2/24.

## 2023-02-24 NOTE — PLAN OF CARE
Goal Outcome Evaluation:       DATE & TIME: 2/23/23-2/24 [id number]  Cognitive Concerns/ Orientation : Pt alert and oriented x2, forgetful. Disoriented to time/situation.    BEHAVIOR & AGGRESSION TOOL COLOR:calm and cooperative   ABNL VS/O2: VSS on RA  MOBILITY: Lift, fall risk, able to reposition self in bed at times, otherwise repositioning, restless at times  PAIN MANAGMENT: Denies pain   DIET: Soft, bite sized with mildly thick liquids. Feeds himself- fair appetite. Bolus tube feeding given in evening scheduled at 6pm and PRN, speech following and video swallow completed today, liquids changed to mildly thick from previous moderately thick.  BOWEL/BLADDER: Continent of bowel, using bed pan during shift, BM x2 soft, anuric  DRAIN/DEVICES: PIV SL. Right external jugular tunneled CVC. PEG tube, abdominal binder on  SKIN: Scattered scabs and bruises. Blanchable redness to coccyx and elbows. Mepilex on bilateral elbows.   D/C DATE: Discharge pending progress, placement  OTHER IMPORTANT INFO:  Psyche following.  All medications given via feeding tube or oral with thickened liquids.  Dialysis scheduled for tomorrow 2/24.

## 2023-02-25 LAB
ALBUMIN SERPL BCG-MCNC: 2.8 G/DL (ref 3.5–5.2)
ANION GAP SERPL CALCULATED.3IONS-SCNC: 9 MMOL/L (ref 7–15)
BUN SERPL-MCNC: 27.6 MG/DL (ref 6–20)
CALCIUM SERPL-MCNC: 9.5 MG/DL (ref 8.6–10)
CHLORIDE SERPL-SCNC: 94 MMOL/L (ref 98–107)
CREAT SERPL-MCNC: 3.38 MG/DL (ref 0.67–1.17)
DEPRECATED HCO3 PLAS-SCNC: 31 MMOL/L (ref 22–29)
GFR SERPL CREATININE-BSD FRML MDRD: 20 ML/MIN/1.73M2
GLUCOSE SERPL-MCNC: 111 MG/DL (ref 70–99)
HGB BLD-MCNC: 8.9 G/DL (ref 13.3–17.7)
PHOSPHATE SERPL-MCNC: 4.3 MG/DL (ref 2.5–4.5)
POTASSIUM SERPL-SCNC: 4.5 MMOL/L (ref 3.4–5.3)
SODIUM SERPL-SCNC: 134 MMOL/L (ref 136–145)

## 2023-02-25 PROCEDURE — 250N000009 HC RX 250: Performed by: STUDENT IN AN ORGANIZED HEALTH CARE EDUCATION/TRAINING PROGRAM

## 2023-02-25 PROCEDURE — 250N000013 HC RX MED GY IP 250 OP 250 PS 637: Performed by: INTERNAL MEDICINE

## 2023-02-25 PROCEDURE — 250N000013 HC RX MED GY IP 250 OP 250 PS 637: Performed by: STUDENT IN AN ORGANIZED HEALTH CARE EDUCATION/TRAINING PROGRAM

## 2023-02-25 PROCEDURE — 85018 HEMOGLOBIN: CPT | Performed by: STUDENT IN AN ORGANIZED HEALTH CARE EDUCATION/TRAINING PROGRAM

## 2023-02-25 PROCEDURE — 36415 COLL VENOUS BLD VENIPUNCTURE: CPT | Performed by: STUDENT IN AN ORGANIZED HEALTH CARE EDUCATION/TRAINING PROGRAM

## 2023-02-25 PROCEDURE — 82040 ASSAY OF SERUM ALBUMIN: CPT | Performed by: INTERNAL MEDICINE

## 2023-02-25 PROCEDURE — 250N000012 HC RX MED GY IP 250 OP 636 PS 637: Performed by: STUDENT IN AN ORGANIZED HEALTH CARE EDUCATION/TRAINING PROGRAM

## 2023-02-25 PROCEDURE — 82306 VITAMIN D 25 HYDROXY: CPT | Performed by: STUDENT IN AN ORGANIZED HEALTH CARE EDUCATION/TRAINING PROGRAM

## 2023-02-25 PROCEDURE — 250N000013 HC RX MED GY IP 250 OP 250 PS 637: Performed by: HOSPITALIST

## 2023-02-25 PROCEDURE — 99232 SBSQ HOSP IP/OBS MODERATE 35: CPT | Performed by: STUDENT IN AN ORGANIZED HEALTH CARE EDUCATION/TRAINING PROGRAM

## 2023-02-25 PROCEDURE — 250N000013 HC RX MED GY IP 250 OP 250 PS 637

## 2023-02-25 PROCEDURE — 120N000001 HC R&B MED SURG/OB

## 2023-02-25 RX ADMIN — TACROLIMUS 1 MG: 5 CAPSULE ORAL at 08:03

## 2023-02-25 RX ADMIN — LACTULOSE 10 G: 10 SOLUTION ORAL at 07:59

## 2023-02-25 RX ADMIN — Medication 40 MG: at 08:03

## 2023-02-25 RX ADMIN — LIDOCAINE 1 PATCH: 560 PATCH PERCUTANEOUS; TOPICAL; TRANSDERMAL at 08:01

## 2023-02-25 RX ADMIN — RAMELTEON 8 MG: 8 TABLET, FILM COATED ORAL at 20:05

## 2023-02-25 RX ADMIN — MIDODRINE HYDROCHLORIDE 10 MG: 5 TABLET ORAL at 17:41

## 2023-02-25 RX ADMIN — MIDODRINE HYDROCHLORIDE 10 MG: 5 TABLET ORAL at 13:21

## 2023-02-25 RX ADMIN — NYSTATIN 500000 UNITS: 100000 SUSPENSION ORAL at 17:42

## 2023-02-25 RX ADMIN — RIFAXIMIN 550 MG: 550 TABLET ORAL at 07:57

## 2023-02-25 RX ADMIN — Medication 50 MCG: at 07:57

## 2023-02-25 RX ADMIN — NYSTATIN 500000 UNITS: 100000 SUSPENSION ORAL at 08:02

## 2023-02-25 RX ADMIN — LACOSAMIDE 100 MG: 10 SOLUTION ORAL at 08:00

## 2023-02-25 RX ADMIN — FOLIC ACID 1 MG: 1 TABLET ORAL at 07:58

## 2023-02-25 RX ADMIN — CALCIUM ACETATE 667 MG: 667 CAPSULE ORAL at 18:00

## 2023-02-25 RX ADMIN — CALCIUM ACETATE 667 MG: 667 CAPSULE ORAL at 07:59

## 2023-02-25 RX ADMIN — ACETAMINOPHEN 650 MG: 325 TABLET, FILM COATED ORAL at 16:29

## 2023-02-25 RX ADMIN — NYSTATIN 500000 UNITS: 100000 SUSPENSION ORAL at 20:07

## 2023-02-25 RX ADMIN — ACETAMINOPHEN 650 MG: 325 TABLET, FILM COATED ORAL at 20:31

## 2023-02-25 RX ADMIN — NYSTATIN 500000 UNITS: 100000 SUSPENSION ORAL at 13:21

## 2023-02-25 RX ADMIN — OLANZAPINE 5 MG: 5 TABLET, ORALLY DISINTEGRATING ORAL at 20:04

## 2023-02-25 RX ADMIN — Medication 40 MG: at 16:11

## 2023-02-25 RX ADMIN — GUAIFENESIN 600 MG: 600 TABLET, EXTENDED RELEASE ORAL at 20:03

## 2023-02-25 RX ADMIN — APIXABAN 5 MG: 5 TABLET, FILM COATED ORAL at 20:05

## 2023-02-25 RX ADMIN — WHITE PETROLATUM: 1.75 OINTMENT TOPICAL at 08:23

## 2023-02-25 RX ADMIN — APIXABAN 5 MG: 5 TABLET, FILM COATED ORAL at 07:58

## 2023-02-25 RX ADMIN — MIDODRINE HYDROCHLORIDE 10 MG: 5 TABLET ORAL at 07:57

## 2023-02-25 RX ADMIN — Medication 1 CAPSULE: at 07:58

## 2023-02-25 RX ADMIN — GUAIFENESIN 600 MG: 600 TABLET, EXTENDED RELEASE ORAL at 07:58

## 2023-02-25 RX ADMIN — CALCIUM ACETATE 667 MG: 667 CAPSULE ORAL at 13:21

## 2023-02-25 RX ADMIN — LACOSAMIDE 100 MG: 10 SOLUTION ORAL at 00:14

## 2023-02-25 RX ADMIN — RIFAXIMIN 550 MG: 550 TABLET ORAL at 20:04

## 2023-02-25 RX ADMIN — LEVETIRACETAM 1000 MG: 100 SOLUTION ORAL at 20:36

## 2023-02-25 RX ADMIN — TACROLIMUS 1 MG: 5 CAPSULE ORAL at 00:14

## 2023-02-25 ASSESSMENT — ACTIVITIES OF DAILY LIVING (ADL)
ADLS_ACUITY_SCORE: 62
ADLS_ACUITY_SCORE: 60
ADLS_ACUITY_SCORE: 56
ADLS_ACUITY_SCORE: 62
ADLS_ACUITY_SCORE: 56
ADLS_ACUITY_SCORE: 60
ADLS_ACUITY_SCORE: 58
ADLS_ACUITY_SCORE: 58
ADLS_ACUITY_SCORE: 56

## 2023-02-25 NOTE — PLAN OF CARE
Goal Outcome Evaluation:                      DATE & TIME: 2/25/23 1430        Cognitive Concerns/ Orientation: Pt disoriented to situation; calm/cooperative with some confusion/forgetfulness   BEHAVIOR & AGGRESSION TOOL COLOR: Green             ABNL VS/O2: VSS on RA  MOBILITY: Assist-2, lift; Turn/Repo every 2 hours. Seizure precautions.   PAIN MANAGMENT: This AM C/o of feeling achy all over, declined. Lidoderm patch applied to lower back. intervention--repositioned for comfort.  DIET: Soft, bite sized (level 6) with mildly thick liquids (level 2); Pt refuses TID and bolus MD NUZHAT aware and said he will re-consult with dietician on Monday.  BOWEL/BLADDER: Continent of bowel, using bed pan.  DRAIN/DEVICES: L PIV saline locked; R external jugular tunneled CVC for hemodialysis; Double lumen PEG tube, clamped and currently used for meds, abd binder in place..  SKIN: Jaundiced, Scattered scabs and bruising. Blanchable redness to coccyx. Light redness left elbow, Mepilex in place.  D/C DATE: Discharge pending placement  OTHER IMPORTANT INFO: Pt able to take some medications with thickened liquids. The rest of the medications given via PEG tube. Lung sounds diminished with infrequent, weak, nonproductive cough.

## 2023-02-25 NOTE — PROGRESS NOTES
[facility]    Medicine Progress Note - Hospitalist Service    Date of Admission:  1/21/2023    Assessment & Plan   Blanco Osborne is a 57 year-old male admitted on 1/21/2023 as a transfer from [facility] for evaluation of loculated pleural effusion. He has extensive PMH including h/o liver transplant 2016 due to alcohol abuse, pAF on chronic anticoagulation, history of diabetes mellitus type 2, CVA, hypertension, CHRISTIAN on BiPAP.  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  He was recovering in [facility] and was transferred to [facility] for consideration of chest tube placement and possible intrapleural lysis for worsening effusion.    Acute metabolic encephalopathy with delirium, multifactorial  Hepatic encephalopathy  Chronic liver disease, history of liver transplant 2016  End-stage renal disease (ESRD), on hemodialysis (HD)  History of seizure, on Keppra and Vimpat  Waxing and waning mentation, coinciding with lactulose being held at [facility]  Earlier in hospital stay, remained confused and had pulled out tracheostomy tube, PICC line and pulled his dialysis catheter.  Lines replaced.  Palliative care consult 1/26, see note.  * Psychiatry consulted 2/2, see note, follow-up requested.    On/off fluctuating mentation, recently improving 2/17, 2/18    Continue to reorient as needed; maintain normal day/night, sleep wake cycles; minimize sedating/altering medications as able    Continue Lactulose to 10 mg BID, monitor for symptoms to have 2-3 Bms    Zyprexa 5 mg at bedtime scheduled.  Melatonin 3 mg p.o. at bedtime scheduled    Olanzapine BID as needed for severe agitation or anxiety; monitor for side effets    Mittens/sitter as needed; minimize use of restraints as able    Ongoing  hemodialysis, as per nephrology, on M-W-F dialysis schedule; ongoing nephrology consult follow-up appreciated    Dialysis labs as per nephrology    Vitamin D levelwas ordered per family request; nephrology managing and treating    Psychiatry consult follow-up requested, last visit 2/2, help appreciated    2/20/23Off restraints.Mentation has improved    2/21/23 -Fall this morningWorsening.Delirium Ramelteon started.Psyciatry consult appreciated  2/22/23 Improved mentation  Continue Ramleteon  2/24/22Wil repeat Vitamin D level as per family request      Bilateral pleural effusions   Acute respiratory failure requiring tracheostomy  - resolved    Pneumonia  - resolved   ARDS  - resolved    Right pneumothorax - resolved   *Initial event was parainfluenza and strep pneumo pneumonia back in November 2022. Treated for ARDS. Had failed extubation x2 and tracheostomy performed on previous hospitalization. *Had additional complications of bilateral pneumothoraces as well as hydropneumothorax- pleural fluid grew candida parapsilosis  *Completed 4-week antifungal treatment. While in LTACH he was successfully weaned from the ventilator.  *Recently there were concern for worsening effusion while at [facility] and had thoracentesis 01/13 which returned exudative without growth on cultures. CT chest/abdomen/pelvis on 01/18 demonstrated worsening effusions and concerns for infected parapneumonic effusions with possible SBP.  Multiple pulmonologist at [facility] reviewed CT scan recommended transfer to [facility] for consideration of chest tube placement and possible intrapleural lysis  *Thoracic surgery (Dr. Jackson) consulted during admission   *CT chest 1/22, small effusions on imaging and so chest tube was not inserted.   ID consulted, see note 2/10.  *Patient pulled out his tracheostomy tube on the night 2/1-2/2 and is tolerating well without increased shortness of breath persistent cough or worsening hypoxia  * Chest x-ray 2/3 with  "cardiomegaly, chronic small bilateral pleural effusions, no pulmonary edema; right internal jugular dialysis catheter in place    Monitor respiratory status, recently stable on room air    Encourage incentive spirometry as able    Wound care previously consulted for tracheostomy site care, monitor for signs and symptoms of infection  2/20/23 Stable on RA  2/22/23 Continues to be on RA             S/p liver transplant 2016   Chronic immunosuppression, on tacrolimus  Cirrhosis with ascites  Subacute bacterial peritonitis (SBP), resolved  *Main manifestations of this are hepatic encephalopathy and ascites.  Hepatologist at [place] felt that most likely reason for the cirrhosis was metabolic syndrome or alcohol intake.  There was no evidence of rejection on biopsy and there was some evidence of regeneration. Paracentesis done on 12/22/2022 showed lactobacillus indicating SBP, treated with Unasyn and Augmentin, finished 2-week course 1/12/2023.  Requires large-volume paracentesis periodically for recurring ascites.    *Paracentesis 1/13/2023 yielded about 7500 cc. Cultures NGTD.  Most recent paracentesis on 1/24 with 4.8 L fluid removal    Continue intermittent paracentesis as needed if develops symptomatic ascites    Monitor for signs and symptoms of recurrent spontaneous bacterial peritonitis     Further treatment for recurrent ascites with TIPS deferred to his Liver Transplant team and/or Hepatology, he has an appointment on 4/21/2023 with Hepatology, Dr. Simms    Continue Tacrolimus 1 mg BID.    Continue rifaximin 550 mg BID    Continue lactulose as ordered, titrate as needed to have 2-3 bms    GI consult as needed in hospital for new acute issues, otherwise as outpatient    Goals of care   As per prior rounding provider, \"on 1/25 nursing had mentioned that patient had refused to undergo dialysis and want to stop care, palliative care was consulted.  Patient and his spouse denied wanting to stop care and wanted " "ongoing restorative care including full code\"    Monitor, Full Code status    -Needs placment to TCU     Diarrhea, improved, on lactulose for chronic liver disease  - C difficile negative on 1/31. Secondary to lactulose.  - discontinued rectal tube (2/12) as stools more formed    Continue lactulose with goal of 2 to 3 soft bowel movement in 24 hours     Paroxysmal atrial fibrillation  Chronic anticoagulation, apixaban  Remains in sinus rhythm, on anticoagulation with apixaban indefinitely for stroke prophylaxis.      Monitor rhythm    Continue apixaban anticoagulation    Monitor hemoglobin, see below     Acute anemia  Pt has required intermittent transfusions for on-going anemia.  Anemia most likely secondary to critical illness/chronic disease, chronic renal disease.  Baseline hemoglobin around 7-8    Transfuse packed red blood cells to keep hemoglobin > 7    Hemoglobin 8.1 on 2/17    Epoetin lfa-epbx use as per nephrology    Repeat hb today      History PEA arrest   PEA arrest prior to his previous admission and 1 episode of PEA associated with desaturation on 12/20.  No structural cardiac disease.     Stable, continue cardiac Monitoring     Chronic Hypotension  In the past has developed baseline hypotension with cirrhosis and prolonged critical illness.  On hemodialysis.  Receiving midodrine and albumin during dailysis    Continue midodrine, as per nephrology      History of seizure   After hypoxic event, in the context of baseline multifactorial encephalopathy secondary to his ongoing critical illness and prior cardiac arrests and prior strokes and cirrhosis with hepatic encephalopathy. MRI of head of 12/20/22 reveals no PRES or leptomeningeal enhancement but scattered.  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   No recent seizure activity.    Continue levetiracetam, lacosamide     Seizure precautions    Outpatient follow-up with neurology, consult inpatient " if needed    # ESRD  # Anemia due to renal disease  # Hypotension during dialysis  -Underwent dialysis  on 2/22/23and was hypotensive and received midodrine and albumin  Receiving albumin with dialysis        Diabetes mellitus type 2  *Hemoglobin A1c on November 2022 was 4.7  *By report, on Lantus insulin in the past.  Blood sugar checks and sliding scale insulin previously discontinued as has been stable without insulin needs    Monitor with periodic blood sugar checks as needed     Severe malnutrition, protein and calorie type  Moderate dysphagia  G-tube feedings    Continue with tube feeds via PEG tube    IR replaced the PEG tube on 2/8/2023, monitor    Nutritionist consulted and following for tube feeds    SLP following as well    Oral diet as per speech and language therapy (SLP)     2/20 Nutrition following for tube feeds    Continue tube feeds    Encourage oral intake    Underwent videoswallow on 2/23/22     Anxiety  Fluoxetine was discontinued as per psychiatry recommendation on 2/2 (see consult note for details)     Stable, monitor; comfort and reassurance offered during visit    Psychiatry follow-up     Disposition     Estimated length of stay several days    Anticipated discharge to transitional care unit (TCU), long-term care, or [facility]; social work consulted, help appreciated; previously at [facility] and by report not able to return there (note 2/10)     Diet: Room Service  Adult Formula Bolus Feeding: Novasource Renal; Route: Gastrostomy; 3 times daily; Volume per Bolus: 240; mL(s); EVERYDAY please bolus at 80 mL/hr x3 hrs at 6pm. Add additional bolus feedings x2 at 80 mL/hr x 3 hrs as back up bolus feeding after br...  Snacks/Supplements Adult: Other; Gelatein+ with lunch and magic cup with dinner (RD); With Meals  Soft & Bite Sized Diet (level 6) Mildly Thick (level 2) (SMALL/SINGLE sips); No Straws    DVT Prophylaxis: DOAC  Nixon Catheter: Not present  Lines: PRESENT      CVC Double Lumen Right  External jugular Tunneled-Site Assessment: WDL      Cardiac Monitoring: None  Code Status: Full Code      Clinically Significant Risk Factors              # Hypoalbuminemia: Lowest albumin = 1.9 g/dL at 1/23/2023  6:38 AM, will monitor as appropriate            # Severe Malnutrition: based on nutrition assessment    Family Ofe and Brother Dylan updated on 2/21/23    Disposition Plan     Expected Discharge Date: 02/28/2023, 12:00 PM    Destination: inpatient rehabilitation facility  Discharge Comments: [facility]. Will need placement          Ashkan Hernandez MD  Hospitalist Service  [facility]  Securely message with [employer] (more info)  Text page via [employer] Paging/Directory   ______________________________________________________________________    Interval History   Mentation improving  Brother and father at bedside   Has back pain  WVital Signs: Temp: 97.7  F (36.5  C) Temp src: Oral BP: 106/68 Pulse: 88   Resp: 16 SpO2: 94 % O2 Device: None (Room air)    Weight: 181 lbs 7.02 oz    Physical Exam  Cardiovascular:      Rate and Rhythm: Normal rate and regular rhythm.      Heart sounds: Normal heart sounds.   Pulmonary:      Effort: Pulmonary effort is normal. No respiratory distress.   Abdominal:      General: There is distension.      Palpations: Abdomen is soft.   Musculoskeletal:      Right lower leg: No edema.      Left lower leg: No edema.   Neurological:      Mental Status: He is alert.      Comments: Intermittent confusion       Medical Decision Making             Data         Imaging results reviewed over the past 24 hrs:   No results found for this or any previous visit (from the past 24 hour(s)).   Creatinine   Date Value Ref Range Status   02/22/2023 2.40 (H) 0.67 - 1.17 mg/dL Final   04/20/2020 1.06 0.66 - 1.25 mg/dL Final     Recent Labs   Lab 02/22/23  1025 02/21/23  0851   HGB 8.2* 8.5*

## 2023-02-25 NOTE — PLAN OF CARE
DATE & TIME: 2/24/23 6085-9175    Cognitive Concerns/ Orientation : Pt A/Ox3, disoriented to situation. Calm and pleasant this evening. Intermittent confusion.   BEHAVIOR & AGGRESSION TOOL COLOR: Green   ABNL VS/O2: VSS on RA  MOBILITY: Lift, fall risk. Reposition q2h.  PAIN MANAGMENT: Complaints of back and bilateral leg pain, declines intervention, repositioned for comfort  DIET: Soft, bite sized with mildly thick liquids.   BOWEL/BLADDER: Continent of bowel this evening, using bed pan.  DRAIN/DEVICES: IV SL. Right external jugular tunneled CVC. PEG tube.  SKIN: Scattered scabs and bruising. Blanchable redness to coccyx. Scabbing on bilateral elbows, Mepilex in place.  D/C DATE: Discharge pending placement  OTHER IMPORTANT INFO: Pt able to take some medications with thickened liquids. The rest of the medications given via PEG tube. Lung sounds diminished with infrequent, nonproductive cough.

## 2023-02-25 NOTE — PLAN OF CARE
Goal Outcome Evaluation:      Plan of Care Reviewed With: patient    DATE & TIME: 2/24/23 Night         Cognitive Concerns/ Orientation: Pt disoriented to situation; calm/cooperative with some confusion/forgetfulness   BEHAVIOR & AGGRESSION TOOL COLOR: Green             ABNL VS/O2: VSS on RA  MOBILITY: Assist-2, lift; Turn/Repo every 2 hours  PAIN MANAGMENT: Back/leg pain, declines intervention--repositioned for comfort  DIET: Soft, bite sized (level 6) with mildly thick liquids (level 2); Intermittent bolus tube feeds (Refused evening/night)   BOWEL/BLADDER: Continent of bowel this evening, using bed pan.  DRAIN/DEVICES: L PIV saline locked; R external jugular tunneled CVC for hemodialysis; Double lumen PEG tube, clamped  SKIN: Scattered scabs and bruising. Blanchable redness to coccyx. Scabbing on bilateral elbows, Mepilex in place.  D/C DATE: Discharge pending placement  OTHER IMPORTANT INFO: Pt able to take some medications with thickened liquids. The rest of the medications given via PEG tube. Lung sounds diminished with infrequent, weak, nonproductive cough.

## 2023-02-26 PROCEDURE — 250N000013 HC RX MED GY IP 250 OP 250 PS 637: Performed by: INTERNAL MEDICINE

## 2023-02-26 PROCEDURE — 99232 SBSQ HOSP IP/OBS MODERATE 35: CPT | Performed by: STUDENT IN AN ORGANIZED HEALTH CARE EDUCATION/TRAINING PROGRAM

## 2023-02-26 PROCEDURE — 250N000013 HC RX MED GY IP 250 OP 250 PS 637: Performed by: STUDENT IN AN ORGANIZED HEALTH CARE EDUCATION/TRAINING PROGRAM

## 2023-02-26 PROCEDURE — 250N000009 HC RX 250: Performed by: STUDENT IN AN ORGANIZED HEALTH CARE EDUCATION/TRAINING PROGRAM

## 2023-02-26 PROCEDURE — 250N000013 HC RX MED GY IP 250 OP 250 PS 637

## 2023-02-26 PROCEDURE — 250N000012 HC RX MED GY IP 250 OP 636 PS 637: Performed by: STUDENT IN AN ORGANIZED HEALTH CARE EDUCATION/TRAINING PROGRAM

## 2023-02-26 PROCEDURE — 120N000001 HC R&B MED SURG/OB

## 2023-02-26 RX ORDER — BENZONATATE 100 MG/1
100 CAPSULE ORAL 3 TIMES DAILY PRN
Status: DISCONTINUED | OUTPATIENT
Start: 2023-02-26 | End: 2023-04-28 | Stop reason: HOSPADM

## 2023-02-26 RX ADMIN — RIFAXIMIN 550 MG: 550 TABLET ORAL at 20:03

## 2023-02-26 RX ADMIN — Medication 40 MG: at 08:12

## 2023-02-26 RX ADMIN — CALCIUM ACETATE 667 MG: 667 CAPSULE ORAL at 12:11

## 2023-02-26 RX ADMIN — RAMELTEON 8 MG: 8 TABLET, FILM COATED ORAL at 20:03

## 2023-02-26 RX ADMIN — LEVETIRACETAM 1000 MG: 100 SOLUTION ORAL at 21:39

## 2023-02-26 RX ADMIN — BENZONATATE 100 MG: 100 CAPSULE ORAL at 18:35

## 2023-02-26 RX ADMIN — OLANZAPINE 5 MG: 5 TABLET, ORALLY DISINTEGRATING ORAL at 20:04

## 2023-02-26 RX ADMIN — APIXABAN 5 MG: 5 TABLET, FILM COATED ORAL at 20:04

## 2023-02-26 RX ADMIN — Medication 50 MCG: at 08:11

## 2023-02-26 RX ADMIN — APIXABAN 5 MG: 5 TABLET, FILM COATED ORAL at 08:11

## 2023-02-26 RX ADMIN — ACETAMINOPHEN 650 MG: 325 TABLET, FILM COATED ORAL at 20:02

## 2023-02-26 RX ADMIN — MIDODRINE HYDROCHLORIDE 10 MG: 5 TABLET ORAL at 17:45

## 2023-02-26 RX ADMIN — LACOSAMIDE 100 MG: 10 SOLUTION ORAL at 00:40

## 2023-02-26 RX ADMIN — MIDODRINE HYDROCHLORIDE 10 MG: 5 TABLET ORAL at 08:11

## 2023-02-26 RX ADMIN — WHITE PETROLATUM: 1.75 OINTMENT TOPICAL at 08:13

## 2023-02-26 RX ADMIN — LACOSAMIDE 100 MG: 10 SOLUTION ORAL at 08:11

## 2023-02-26 RX ADMIN — Medication 1 CAPSULE: at 08:11

## 2023-02-26 RX ADMIN — CALCIUM ACETATE 667 MG: 667 CAPSULE ORAL at 17:45

## 2023-02-26 RX ADMIN — Medication 40 MG: at 16:00

## 2023-02-26 RX ADMIN — ACETAMINOPHEN 650 MG: 325 TABLET, FILM COATED ORAL at 08:24

## 2023-02-26 RX ADMIN — GUAIFENESIN 600 MG: 600 TABLET, EXTENDED RELEASE ORAL at 20:04

## 2023-02-26 RX ADMIN — TACROLIMUS 1 MG: 5 CAPSULE ORAL at 23:48

## 2023-02-26 RX ADMIN — MIDODRINE HYDROCHLORIDE 10 MG: 5 TABLET ORAL at 12:10

## 2023-02-26 RX ADMIN — GUAIFENESIN 600 MG: 600 TABLET, EXTENDED RELEASE ORAL at 08:11

## 2023-02-26 RX ADMIN — TACROLIMUS 1 MG: 5 CAPSULE ORAL at 00:40

## 2023-02-26 RX ADMIN — RIFAXIMIN 550 MG: 550 TABLET ORAL at 08:10

## 2023-02-26 RX ADMIN — TACROLIMUS 1 MG: 5 CAPSULE ORAL at 08:11

## 2023-02-26 RX ADMIN — OLANZAPINE 5 MG: 5 TABLET, ORALLY DISINTEGRATING ORAL at 23:41

## 2023-02-26 RX ADMIN — FOLIC ACID 1 MG: 1 TABLET ORAL at 08:11

## 2023-02-26 RX ADMIN — CALCIUM ACETATE 667 MG: 667 CAPSULE ORAL at 08:11

## 2023-02-26 RX ADMIN — LIDOCAINE 1 PATCH: 560 PATCH PERCUTANEOUS; TOPICAL; TRANSDERMAL at 08:10

## 2023-02-26 ASSESSMENT — ACTIVITIES OF DAILY LIVING (ADL)
ADLS_ACUITY_SCORE: 60
ADLS_ACUITY_SCORE: 60
ADLS_ACUITY_SCORE: 56
ADLS_ACUITY_SCORE: 60
ADLS_ACUITY_SCORE: 56
ADLS_ACUITY_SCORE: 56
ADLS_ACUITY_SCORE: 64
ADLS_ACUITY_SCORE: 56
ADLS_ACUITY_SCORE: 56
ADLS_ACUITY_SCORE: 60
ADLS_ACUITY_SCORE: 64
ADLS_ACUITY_SCORE: 56

## 2023-02-26 NOTE — PROGRESS NOTES
[facility]    Medicine Progress Note - Hospitalist Service    Date of Admission:  1/21/2023    Assessment & Plan   Blanco Osborne is a 57 year-old male admitted on 1/21/2023 as a transfer from [facility] for evaluation of loculated pleural effusion. He has extensive PMH including h/o liver transplant 2016 due to alcohol abuse, pAF on chronic anticoagulation, history of diabetes mellitus type 2, CVA, hypertension, CHRISTIAN on BiPAP.  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  He was recovering in [facility] and was transferred to [facility] for consideration of chest tube placement and possible intrapleural lysis for worsening effusion.    Acute metabolic encephalopathy with delirium, multifactorial  Hepatic encephalopathy  Chronic liver disease, history of liver transplant 2016  End-stage renal disease (ESRD), on hemodialysis (HD)  History of seizure, on Keppra and Vimpat  Waxing and waning mentation, coinciding with lactulose being held at [facility]  Earlier in hospital stay, remained confused and had pulled out tracheostomy tube, PICC line and pulled his dialysis catheter.  Lines replaced.  Palliative care consult 1/26, see note.  * Psychiatry consulted 2/2, see note, follow-up requested.    On/off fluctuating mentation, recently improving 2/17, 2/18    Continue to reorient as needed; maintain normal day/night, sleep wake cycles; minimize sedating/altering medications as able    Continue Lactulose to 10 mg BID, monitor for symptoms to have 2-3 Bms    Zyprexa 5 mg at bedtime scheduled.  Melatonin 3 mg p.o. at bedtime scheduled    Olanzapine BID as needed for severe agitation or anxiety; monitor for side effets    Mittens/sitter as needed; minimize use of restraints as able    Ongoing  hemodialysis, as per nephrology, on M-W-F dialysis schedule; ongoing nephrology consult follow-up appreciated    Dialysis labs as per nephrology    Vitamin D levelwas ordered per family request; nephrology managing and treating    Psychiatry consult follow-up requested, last visit 2/2, help appreciated    2/20/23Off restraints.Mentation has improved    2/21/23 -Fall this morningWorsening.Delirium Ramelteon started.Psyciatry consult appreciated  2/22/23 Improved mentation  Continue Ramleteon  2/24/22Wil repeat Vitamin D level as per family request      Bilateral pleural effusions   Acute respiratory failure requiring tracheostomy  - resolved    Pneumonia  - resolved   ARDS  - resolved    Right pneumothorax - resolved   *Initial event was parainfluenza and strep pneumo pneumonia back in November 2022. Treated for ARDS. Had failed extubation x2 and tracheostomy performed on previous hospitalization. *Had additional complications of bilateral pneumothoraces as well as hydropneumothorax- pleural fluid grew candida parapsilosis  *Completed 4-week antifungal treatment. While in LTACH he was successfully weaned from the ventilator.  *Recently there were concern for worsening effusion while at [facility] and had thoracentesis 01/13 which returned exudative without growth on cultures. CT chest/abdomen/pelvis on 01/18 demonstrated worsening effusions and concerns for infected parapneumonic effusions with possible SBP.  Multiple pulmonologist at [facility] reviewed CT scan recommended transfer to [facility] for consideration of chest tube placement and possible intrapleural lysis  *Thoracic surgery (Dr. Jackson) consulted during admission   *CT chest 1/22, small effusions on imaging and so chest tube was not inserted.   ID consulted, see note 2/10.  *Patient pulled out his tracheostomy tube on the night 2/1-2/2 and is tolerating well without increased shortness of breath persistent cough or worsening hypoxia  * Chest x-ray 2/3 with  "cardiomegaly, chronic small bilateral pleural effusions, no pulmonary edema; right internal jugular dialysis catheter in place    Monitor respiratory status, recently stable on room air    Encourage incentive spirometry as able    Wound care previously consulted for tracheostomy site care, monitor for signs and symptoms of infection  2/20/23 Stable on RA  2/22/23 Continues to be on RA             S/p liver transplant 2016   Chronic immunosuppression, on tacrolimus  Cirrhosis with ascites  Subacute bacterial peritonitis (SBP), resolved  *Main manifestations of this are hepatic encephalopathy and ascites.  Hepatologist at [facility] felt that most likely reason for the cirrhosis was metabolic syndrome or alcohol intake.  There was no evidence of rejection on biopsy and there was some evidence of regeneration. Paracentesis done on 12/22/2022 showed lactobacillus indicating SBP, treated with Unasyn and Augmentin, finished 2-week course 1/12/2023.  Requires large-volume paracentesis periodically for recurring ascites.    *Paracentesis 1/13/2023 yielded about 7500 cc. Cultures NGTD.  Most recent paracentesis on 1/24 with 4.8 L fluid removal    Continue intermittent paracentesis as needed if develops symptomatic ascites    Monitor for signs and symptoms of recurrent spontaneous bacterial peritonitis     Further treatment for recurrent ascites with TIPS deferred to his Liver Transplant team and/or Hepatology, he has an appointment on 4/21/2023 with Hepatology, Dr. Simms    Continue Tacrolimus 1 mg BID.    Continue rifaximin 550 mg BID    Continue lactulose as ordered, titrate as needed to have 2-3 bms    GI consult as needed in hospital for new acute issues, otherwise as outpatient    Goals of care   As per prior rounding provider, \"on 1/25 nursing had mentioned that patient had refused to undergo dialysis and want to stop care, palliative care was consulted.  Patient and his spouse denied wanting to stop care and wanted " "ongoing restorative care including full code\"    Monitor, Full Code status    -Needs placment to TCU     Diarrhea, improved, on lactulose for chronic liver disease  - C difficile negative on 1/31. Secondary to lactulose.  - discontinued rectal tube (2/12) as stools more formed    Continue lactulose with goal of 2 to 3 soft bowel movement in 24 hours     Paroxysmal atrial fibrillation  Chronic anticoagulation, apixaban  Remains in sinus rhythm, on anticoagulation with apixaban indefinitely for stroke prophylaxis.      Monitor rhythm    Continue apixaban anticoagulation    Monitor hemoglobin, see below     Acute anemia  Pt has required intermittent transfusions for on-going anemia.  Anemia most likely secondary to critical illness/chronic disease, chronic renal disease.  Baseline hemoglobin around 7-8  Likely Epo resistance    Transfuse packed red blood cells to keep hemoglobin > 7    Hemoglobin 8.1 on 2/17    Epoetin lfa-epbx use as per nephrology    Hemoglobin 8.9 on 2/25     History PEA arrest   PEA arrest prior to his previous admission and 1 episode of PEA associated with desaturation on 12/20.  No structural cardiac disease.     Stable, continue cardiac Monitoring     Chronic Hypotension  In the past has developed baseline hypotension with cirrhosis and prolonged critical illness.  On hemodialysis.  Receiving midodrine and albumin during dailysis    Continue midodrine, as per nephrology      History of seizure   After hypoxic event, in the context of baseline multifactorial encephalopathy secondary to his ongoing critical illness and prior cardiac arrests and prior strokes and cirrhosis with hepatic encephalopathy. MRI of head of 12/20/22 reveals no PRES or leptomeningeal enhancement but scattered.  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   No recent seizure activity.    Continue levetiracetam, lacosamide     Seizure precautions    Outpatient follow-up with " neurology, consult inpatient if needed    # ESRD  # Anemia due to renal disease  # Hypotension during dialysis  -Underwent dialysis  on 2/22/23and was hypotensive and received midodrine and albumin  Receiving albumin with dialysis        Diabetes mellitus type 2  *Hemoglobin A1c on November 2022 was 4.7  *By report, on Lantus insulin in the past.  Blood sugar checks and sliding scale insulin previously discontinued as has been stable without insulin needs    Monitor with periodic blood sugar checks as needed     Severe malnutrition, protein and calorie type  Moderate dysphagia  G-tube feedings    Continue with tube feeds via PEG tube    IR replaced the PEG tube on 2/8/2023, monitor    Nutritionist consulted and following for tube feeds    SLP following as well    Oral diet as per speech and language therapy (SLP)     2/20 Nutrition following for tube feeds    Continue tube feeds    Encourage oral intake  -Need to discuss with nutrition tomorrow about weaning off tube feeds and encourage oral nutrition based on his nutrition status     Underwent videoswallow on 2/23/22     Anxiety  Fluoxetine was discontinued as per psychiatry recommendation on 2/2 (see consult note for details)     Stable, monitor; comfort and reassurance offered during visit    Psychiatry follow-up     Disposition   Anticipated discharge to transitional care unit (TCU),  aware and will place referrals      Diet: Room Service  Adult Formula Bolus Feeding: Novasource Renal; Route: Gastrostomy; 3 times daily; Volume per Bolus: 240; mL(s); EVERYDAY please bolus at 80 mL/hr x3 hrs at 6pm. Add additional bolus feedings x2 at 80 mL/hr x 3 hrs as back up bolus feeding after br...  Snacks/Supplements Adult: Other; Gelatein+ with lunch and magic cup with dinner (RD); With Meals  Soft & Bite Sized Diet (level 6) Mildly Thick (level 2) (SMALL/SINGLE sips); No Straws    DVT Prophylaxis: DOAC  Nixon Catheter: Not present  Lines: PRESENT      CVC  Double Lumen Right External jugular Tunneled-Site Assessment: WDL      Cardiac Monitoring: None  Code Status: Full Code      Clinically Significant Risk Factors           # Hypercalcemia: corrected calcium is >10.1, will monitor as appropriate    # Hypoalbuminemia: Lowest albumin = 1.9 g/dL at 1/23/2023  6:38 AM, will monitor as appropriate            # Severe Malnutrition: based on nutrition assessment    Family Ofe and Brother Dylan updated on 2/21/23    Disposition Plan      Expected Discharge Date: 02/28/2023, 12:00 PM    Destination: inpatient rehabilitation facility  Discharge Comments: admitted on 1-21 from [facility], Dialysis pt MWF. Will need placement          Ashkan Hernandez MD  Hospitalist Service  [facility]  Securely message with [employer] (more info)  Text page via [employer] Paging/Directory   ______________________________________________________________________    Interval History   Mentation improving  Brother and father at bedside   Improved mentation.  Complaining of back pain        WVital Signs: Temp: 97.8  F (36.6  C) Temp src: Oral BP: 104/73 Pulse: 86   Resp: 16 SpO2: 93 % O2 Device: None (Room air)    Weight: 181 lbs 7.02 oz    Physical Exam  Cardiovascular:      Rate and Rhythm: Normal rate and regular rhythm.      Heart sounds: Normal heart sounds.   Pulmonary:      Effort: Pulmonary effort is normal. No respiratory distress.   Abdominal:      General: There is distension.      Palpations: Abdomen is soft.   Musculoskeletal:      Right lower leg: No edema.      Left lower leg: No edema.   Neurological:      Mental Status: He is alert.      Comments: Intermittent confusion       Medical Decision Making             Data     I have personally reviewed the following data over the past 24 hrs:    N/A  \   8.9 (L)   / N/A     134 (L) 94 (L) 27.6 (H) /  111 (H)   4.5 31 (H) 3.38 (H) \       ALT: N/A AST: N/A AP: N/A TBILI: N/A   ALB: 2.8 (L) TOT PROTEIN: N/A  LIPASE: N/A       Imaging results reviewed over the past 24 hrs:   No results found for this or any previous visit (from the past 24 hour(s)).   Creatinine   Date Value Ref Range Status   02/25/2023 3.38 (H) 0.67 - 1.17 mg/dL Final   04/20/2020 1.06 0.66 - 1.25 mg/dL Final     Recent Labs   Lab 02/25/23  1448 02/22/23  1025 02/21/23  0851   HGB 8.9* 8.2* 8.5*

## 2023-02-26 NOTE — PROGRESS NOTES
Notes, labs, vitals reviewed.    1.  ESKD.  HD tmrw.  Orders placed.  2.  Anemia. Hb stable in the 8s.

## 2023-02-26 NOTE — PLAN OF CARE
Goal Outcome Evaluation:                      DATE & TIME: 2/26 1430       Cognitive Concerns/ Orientation: Pt disoriented to situation; calm/cooperative with some confusion/forgetfulness   BEHAVIOR & AGGRESSION TOOL COLOR: Green             ABNL VS/O2: VSS on RA  Labs: BUN 27.6, Creat 3.38, Hb 8.9.  MOBILITY: Assist-2, lift; Turn/Repo every 2 hours. Seizure precautions. Has been up to commode and chair quite a bit today. Pt tried to stand with 2 assist and walker but was unable.  PAIN MANAGMENT: Tylenol given x1 for back pain, has Lidoderm patch to lower back.  DIET: Soft, bite sized (level 6) with mildly thick liquids (level 2); Pt refuses TID and bolus MD NUZHAT aware and said he will re-consult with dietician on Monday.  BOWEL/BLADDER: Continent of bowel, using bed pan and commode. Pt on hemodialysis, there has been little to no urine present with straight caths, despite bladder scans showing lg amount.  DRAIN/DEVICES: L PIV saline locked; R external jugular tunneled CVC for hemodialysis; Double lumen PEG tube, clamped and currently used for liquid meds, pt has been taking pills po now.  SKIN: Jaundiced, Scattered scabs and bruising. Blanchable redness to coccyx. Light redness left elbow, Mepilex in place.  D/C DATE: Discharge pending placement  OTHER IMPORTANT INFO: Pt able to take all medications with thickened liquids. Lung sounds diminished with infrequent, weak, nonproductive cough.

## 2023-02-26 NOTE — PLAN OF CARE
Goal Outcome Evaluation:    DATE & TIME: 2/25-2/26 [id number]        Cognitive Concerns/ Orientation: Pt disoriented to situation; calm/cooperative with some confusion/forgetfulness   BEHAVIOR & AGGRESSION TOOL COLOR: Green             ABNL VS/O2: VSS on RA  MOBILITY: Assist-2, lift; Turn/Repo every 2 hours. Seizure precautions.   PAIN MANAGMENT: Tylenol given x1 for back pain, repositioned for comfort.  DIET: Soft, bite sized (level 6) with mildly thick liquids (level 2); Pt refuses TID and bolus MD NUZHAT aware and said he will re-consult with dietician on Monday.  BOWEL/BLADDER: Continent of bowel, using bed pan and commode.  DRAIN/DEVICES: L PIV saline locked; R external jugular tunneled CVC for hemodialysis; Double lumen PEG tube, clamped and currently used for meds, abd binder in place.  SKIN: Jaundiced, Scattered scabs and bruising. Blanchable redness to coccyx. Light redness left elbow, Mepilex in place.  D/C DATE: Discharge pending placement  OTHER IMPORTANT INFO: Pt able to take all medications with thickened liquids. Lung sounds diminished with infrequent, weak, nonproductive cough.

## 2023-02-27 ENCOUNTER — APPOINTMENT (OUTPATIENT)
Dept: SPEECH THERAPY | Facility: CLINIC | Age: 58
DRG: 981 | End: 2023-02-27
Attending: STUDENT IN AN ORGANIZED HEALTH CARE EDUCATION/TRAINING PROGRAM
Payer: COMMERCIAL

## 2023-02-27 LAB
ANION GAP SERPL CALCULATED.3IONS-SCNC: 9 MMOL/L (ref 7–15)
BUN SERPL-MCNC: 21.6 MG/DL (ref 6–20)
CALCIUM SERPL-MCNC: 8.6 MG/DL (ref 8.6–10)
CHLORIDE SERPL-SCNC: 99 MMOL/L (ref 98–107)
CREAT SERPL-MCNC: 2.49 MG/DL (ref 0.67–1.17)
DEPRECATED CALCIDIOL+CALCIFEROL SERPL-MC: 25 UG/L (ref 20–75)
DEPRECATED HCO3 PLAS-SCNC: 29 MMOL/L (ref 22–29)
ERYTHROCYTE [DISTWIDTH] IN BLOOD BY AUTOMATED COUNT: 17.5 % (ref 10–15)
GFR SERPL CREATININE-BSD FRML MDRD: 29 ML/MIN/1.73M2
GLUCOSE SERPL-MCNC: 82 MG/DL (ref 70–99)
HCT VFR BLD AUTO: 32.7 % (ref 40–53)
HGB BLD-MCNC: 9.3 G/DL (ref 13.3–17.7)
MAGNESIUM SERPL-MCNC: 1.5 MG/DL (ref 1.7–2.3)
MAGNESIUM SERPL-MCNC: 2.6 MG/DL (ref 1.7–2.3)
MCH RBC QN AUTO: 29.5 PG (ref 26.5–33)
MCHC RBC AUTO-ENTMCNC: 28.4 G/DL (ref 31.5–36.5)
MCV RBC AUTO: 104 FL (ref 78–100)
PHOSPHATE SERPL-MCNC: 3.1 MG/DL (ref 2.5–4.5)
PLATELET # BLD AUTO: 133 10E3/UL (ref 150–450)
POTASSIUM SERPL-SCNC: 4.2 MMOL/L (ref 3.4–5.3)
RBC # BLD AUTO: 3.15 10E6/UL (ref 4.4–5.9)
SODIUM SERPL-SCNC: 137 MMOL/L (ref 136–145)
WBC # BLD AUTO: 3.4 10E3/UL (ref 4–11)

## 2023-02-27 PROCEDURE — 250N000013 HC RX MED GY IP 250 OP 250 PS 637: Performed by: INTERNAL MEDICINE

## 2023-02-27 PROCEDURE — 83735 ASSAY OF MAGNESIUM: CPT | Performed by: INTERNAL MEDICINE

## 2023-02-27 PROCEDURE — 250N000013 HC RX MED GY IP 250 OP 250 PS 637: Performed by: STUDENT IN AN ORGANIZED HEALTH CARE EDUCATION/TRAINING PROGRAM

## 2023-02-27 PROCEDURE — 90935 HEMODIALYSIS ONE EVALUATION: CPT | Performed by: INTERNAL MEDICINE

## 2023-02-27 PROCEDURE — 250N000012 HC RX MED GY IP 250 OP 636 PS 637: Performed by: STUDENT IN AN ORGANIZED HEALTH CARE EDUCATION/TRAINING PROGRAM

## 2023-02-27 PROCEDURE — 634N000001 HC RX 634: Performed by: INTERNAL MEDICINE

## 2023-02-27 PROCEDURE — 84100 ASSAY OF PHOSPHORUS: CPT | Performed by: STUDENT IN AN ORGANIZED HEALTH CARE EDUCATION/TRAINING PROGRAM

## 2023-02-27 PROCEDURE — 258N000003 HC RX IP 258 OP 636: Performed by: INTERNAL MEDICINE

## 2023-02-27 PROCEDURE — 85027 COMPLETE CBC AUTOMATED: CPT | Performed by: INTERNAL MEDICINE

## 2023-02-27 PROCEDURE — P9045 ALBUMIN (HUMAN), 5%, 250 ML: HCPCS | Performed by: INTERNAL MEDICINE

## 2023-02-27 PROCEDURE — 92526 ORAL FUNCTION THERAPY: CPT | Mod: GN

## 2023-02-27 PROCEDURE — 36415 COLL VENOUS BLD VENIPUNCTURE: CPT | Performed by: INTERNAL MEDICINE

## 2023-02-27 PROCEDURE — 99233 SBSQ HOSP IP/OBS HIGH 50: CPT | Performed by: INTERNAL MEDICINE

## 2023-02-27 PROCEDURE — 250N000011 HC RX IP 250 OP 636: Performed by: INTERNAL MEDICINE

## 2023-02-27 PROCEDURE — 90937 HEMODIALYSIS REPEATED EVAL: CPT

## 2023-02-27 PROCEDURE — 120N000001 HC R&B MED SURG/OB

## 2023-02-27 PROCEDURE — 250N000013 HC RX MED GY IP 250 OP 250 PS 637: Performed by: HOSPITALIST

## 2023-02-27 PROCEDURE — 250N000013 HC RX MED GY IP 250 OP 250 PS 637

## 2023-02-27 PROCEDURE — 83735 ASSAY OF MAGNESIUM: CPT | Performed by: STUDENT IN AN ORGANIZED HEALTH CARE EDUCATION/TRAINING PROGRAM

## 2023-02-27 PROCEDURE — 82310 ASSAY OF CALCIUM: CPT | Performed by: STUDENT IN AN ORGANIZED HEALTH CARE EDUCATION/TRAINING PROGRAM

## 2023-02-27 PROCEDURE — 250N000009 HC RX 250: Performed by: STUDENT IN AN ORGANIZED HEALTH CARE EDUCATION/TRAINING PROGRAM

## 2023-02-27 RX ORDER — MAGNESIUM SULFATE HEPTAHYDRATE 40 MG/ML
4 INJECTION, SOLUTION INTRAVENOUS ONCE
Status: COMPLETED | OUTPATIENT
Start: 2023-02-27 | End: 2023-02-27

## 2023-02-27 RX ORDER — ALBUMIN (HUMAN) 12.5 G/50ML
50 SOLUTION INTRAVENOUS
Status: DISCONTINUED | OUTPATIENT
Start: 2023-02-27 | End: 2023-02-27

## 2023-02-27 RX ADMIN — LACTULOSE 10 G: 10 SOLUTION ORAL at 11:45

## 2023-02-27 RX ADMIN — GUAIFENESIN 600 MG: 600 TABLET, EXTENDED RELEASE ORAL at 11:45

## 2023-02-27 RX ADMIN — APIXABAN 5 MG: 5 TABLET, FILM COATED ORAL at 11:45

## 2023-02-27 RX ADMIN — LIDOCAINE 1 PATCH: 560 PATCH PERCUTANEOUS; TOPICAL; TRANSDERMAL at 11:46

## 2023-02-27 RX ADMIN — Medication 1 CAPSULE: at 11:45

## 2023-02-27 RX ADMIN — MIDODRINE HYDROCHLORIDE 10 MG: 5 TABLET ORAL at 11:44

## 2023-02-27 RX ADMIN — ACETAMINOPHEN 650 MG: 325 TABLET, FILM COATED ORAL at 08:51

## 2023-02-27 RX ADMIN — CALCIUM ACETATE 667 MG: 667 CAPSULE ORAL at 17:16

## 2023-02-27 RX ADMIN — MAGNESIUM SULFATE HEPTAHYDRATE 4 G: 40 INJECTION, SOLUTION INTRAVENOUS at 17:15

## 2023-02-27 RX ADMIN — APIXABAN 5 MG: 5 TABLET, FILM COATED ORAL at 21:24

## 2023-02-27 RX ADMIN — ALBUMIN HUMAN 250 ML: 0.05 INJECTION, SOLUTION INTRAVENOUS at 11:05

## 2023-02-27 RX ADMIN — EPOETIN ALFA-EPBX 10000 UNITS: 10000 INJECTION, SOLUTION INTRAVENOUS; SUBCUTANEOUS at 11:05

## 2023-02-27 RX ADMIN — FOLIC ACID 1 MG: 1 TABLET ORAL at 11:45

## 2023-02-27 RX ADMIN — OLANZAPINE 5 MG: 5 TABLET, ORALLY DISINTEGRATING ORAL at 21:24

## 2023-02-27 RX ADMIN — HEPARIN SODIUM 1900 UNITS: 1000 INJECTION INTRAVENOUS; SUBCUTANEOUS at 11:10

## 2023-02-27 RX ADMIN — LEVETIRACETAM 1000 MG: 100 SOLUTION ORAL at 23:42

## 2023-02-27 RX ADMIN — CALCIUM ACETATE 667 MG: 667 CAPSULE ORAL at 11:45

## 2023-02-27 RX ADMIN — WHITE PETROLATUM: 1.75 OINTMENT TOPICAL at 11:48

## 2023-02-27 RX ADMIN — Medication 40 MG: at 17:16

## 2023-02-27 RX ADMIN — NYSTATIN 500000 UNITS: 100000 SUSPENSION ORAL at 17:16

## 2023-02-27 RX ADMIN — SODIUM CHLORIDE 300 ML: 9 INJECTION, SOLUTION INTRAVENOUS at 11:06

## 2023-02-27 RX ADMIN — Medication 50 MCG: at 11:45

## 2023-02-27 RX ADMIN — Medication 40 MG: at 11:46

## 2023-02-27 RX ADMIN — RAMELTEON 8 MG: 8 TABLET, FILM COATED ORAL at 21:24

## 2023-02-27 RX ADMIN — HEPARIN SODIUM 1900 UNITS: 1000 INJECTION INTRAVENOUS; SUBCUTANEOUS at 11:11

## 2023-02-27 RX ADMIN — LACOSAMIDE 100 MG: 10 SOLUTION ORAL at 00:46

## 2023-02-27 RX ADMIN — RIFAXIMIN 550 MG: 550 TABLET ORAL at 11:45

## 2023-02-27 RX ADMIN — ACETAMINOPHEN 650 MG: 325 TABLET, FILM COATED ORAL at 21:32

## 2023-02-27 RX ADMIN — RIFAXIMIN 550 MG: 550 TABLET ORAL at 21:24

## 2023-02-27 RX ADMIN — MIDODRINE HYDROCHLORIDE 10 MG: 5 TABLET ORAL at 17:16

## 2023-02-27 RX ADMIN — NYSTATIN 500000 UNITS: 100000 SUSPENSION ORAL at 12:00

## 2023-02-27 RX ADMIN — TACROLIMUS 1 MG: 5 CAPSULE ORAL at 11:46

## 2023-02-27 RX ADMIN — LACOSAMIDE 100 MG: 10 SOLUTION ORAL at 11:46

## 2023-02-27 RX ADMIN — GUAIFENESIN 600 MG: 600 TABLET, EXTENDED RELEASE ORAL at 21:24

## 2023-02-27 ASSESSMENT — ACTIVITIES OF DAILY LIVING (ADL)
ADLS_ACUITY_SCORE: 60
ADLS_ACUITY_SCORE: 64
ADLS_ACUITY_SCORE: 58
ADLS_ACUITY_SCORE: 60
ADLS_ACUITY_SCORE: 58
ADLS_ACUITY_SCORE: 60
ADLS_ACUITY_SCORE: 58
ADLS_ACUITY_SCORE: 64
ADLS_ACUITY_SCORE: 60
ADLS_ACUITY_SCORE: 58
ADLS_ACUITY_SCORE: 64
ADLS_ACUITY_SCORE: 58

## 2023-02-27 NOTE — PLAN OF CARE
Goal Outcome Evaluation:    DATE & TIME: 2/26-2/27 [id number]       Cognitive Concerns/ Orientation: Pt disoriented to situation; calm/cooperative with some confusion/forgetfulness   BEHAVIOR & AGGRESSION TOOL COLOR: Green             ABNL VS/O2: VSS on RA  Labs:Hb 8.9.  MOBILITY: Assist-2, lift; Turn/Repo every 2 hours. Seizure precautions. Has been up to commode. Up to chair.    PAIN MANAGMENT: Tylenol given x1 for back pain, PRN Zyprexa given for anxiety.   DIET: Soft, bite sized (level 6) with mildly thick liquids (level 2); Pt refuses TID and bolus MD NUZHAT aware and said he will re-consult with dietician on Monday.  BOWEL/BLADDER: Continent of bowel, using bed pan and commode. Pt on hemodialysis  DRAIN/DEVICES: L PIV saline locked; R external jugular tunneled CVC for hemodialysis; Double lumen PEG tube, clamped and currently used for liquid meds, pt has been taking pills po now.  SKIN: Jaundiced, Scattered scabs and bruising. Blanchable redness to coccyx. Light redness left elbow, Mepilex in place.  D/C DATE: Discharge pending placement  OTHER IMPORTANT INFO: Pt hallucinating tonight, frequently getting out of bed, difficulty sleeping. Lung sounds diminished with infrequent, weak, nonproductive cough. Dialysis today 2/27.

## 2023-02-27 NOTE — PROGRESS NOTES
[facility]    Medicine Progress Note - Hospitalist Service    Date of Admission:  1/21/2023    Assessment & Plan   Blanco Osborne is a 57 year-old male admitted on 1/21/2023 as a transfer from [facility] for evaluation of loculated pleural effusion. He has extensive PMH including h/o liver transplant 2016 due to alcohol abuse, pAF on chronic anticoagulation, history of diabetes mellitus type 2, CVA, hypertension, CHRISTIAN on BiPAP.  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  He was recovering in [facility] and was transferred to [facility] for consideration of chest tube placement and possible intrapleural lysis for worsening effusion.     Acute metabolic encephalopathy with delirium, multifactorial  Hepatic encephalopathy  Chronic liver disease, history of liver transplant 2016  End-stage renal disease (ESRD), on hemodialysis (HD)  History of seizure, on Keppra and Vimpat  Waxing and waning mentation, coinciding with lactulose being held at [facility]  Earlier in hospital stay, remained confused and had pulled out tracheostomy tube, PICC line and pulled his dialysis catheter.  Lines replaced.  Palliative care consult 1/26, see note.  * Psychiatry consulted 2/2, see note, follow-up requested.    On/off fluctuating mentation, recently improving     Continue to reorient as needed; maintain normal day/night, sleep wake cycles; minimize sedating/altering medications as able    Continue Lactulose to 10 mg BID, monitor for symptoms to have 2-3 Bms    Zyprexa 5 mg at bedtime scheduled.  Melatonin 3 mg p.o. at bedtime scheduled    Olanzapine BID as needed for severe agitation or anxiety; monitor for side effets    Mittens/sitter as needed; minimize use of restraints as able    Ongoing hemodialysis, as  per nephrology, on M-W-F dialysis schedule; ongoing nephrology consult follow-up appreciated    2/20/23 Off restraints.Mentation has improved    2/21/23 -Fall this morningWorsening.Delirium. Ramelteon started.Psyciatry consult appreciated  2/22/23 Improved mentation     Bilateral pleural effusions   Acute respiratory failure requiring tracheostomy  - resolved    Pneumonia  - resolved   ARDS  - resolved    Right pneumothorax - resolved   *Initial event was parainfluenza and strep pneumo pneumonia back in November 2022. Treated for ARDS. Had failed extubation x2 and tracheostomy performed on previous hospitalization. *Had additional complications of bilateral pneumothoraces as well as hydropneumothorax- pleural fluid grew candida parapsilosis  *Completed 4-week antifungal treatment. [facility] in [facility] he was successfully weaned from the ventilator.  *Recently there were concern for worsening effusion while at [facility] and had thoracentesis 01/13 which returned exudative without growth on cultures. CT chest/abdomen/pelvis on 01/18 demonstrated worsening effusions and concerns for infected parapneumonic effusions with possible SBP.  Multiple pulmonologist at [facility] reviewed CT scan recommended transfer to [facility] for consideration of chest tube placement and possible intrapleural lysis  *Thoracic surgery (Dr. Jackson) consulted during admission   *CT chest 1/22, small effusions on imaging and so chest tube was not inserted.   ID consulted, see note 2/10.  *Patient pulled out his tracheostomy tube on the night 2/1-2/2 and is tolerating well without increased shortness of breath persistent cough or worsening hypoxia  * Chest x-ray 2/3 with cardiomegaly, chronic small bilateral pleural effusions, no pulmonary edema; right internal jugular dialysis catheter in place    Monitor respiratory status, recently stable on room air    Encourage incentive spirometry as able    Wound care previously consulted for tracheostomy site care,  "monitor for signs and symptoms of infection    - Now Stable on RA      S/p liver transplant 2016   Chronic immunosuppression, on tacrolimus  Cirrhosis with ascites  Subacute bacterial peritonitis (SBP), resolved  *Main manifestations of this are hepatic encephalopathy and ascites.  Hepatologist at [place] felt that most likely reason for the cirrhosis was metabolic syndrome or alcohol intake.  There was no evidence of rejection on biopsy and there was some evidence of regeneration. Paracentesis done on 12/22/2022 showed lactobacillus indicating SBP, treated with Unasyn and Augmentin, finished 2-week course 1/12/2023.  Requires large-volume paracentesis periodically for recurring ascites.    *Paracentesis 1/13/2023 yielded about 7500 cc. Cultures NGTD.  Most recent paracentesis on 1/24 with 4.8 L fluid removal    Continue intermittent paracentesis as needed if develops symptomatic ascites    Monitor for signs and symptoms of recurrent spontaneous bacterial peritonitis     Further treatment for recurrent ascites with TIPS deferred to his Liver Transplant team and/or Hepatology, he has an appointment on 4/21/2023 with Hepatology, Dr. Simms    Continue Tacrolimus 1 mg BID.    Continue rifaximin 550 mg BID    Continue lactulose as ordered, titrate as needed to have 2-3 bms    GI consult as needed in hospital for new acute issues, otherwise as outpatient     Goals of care   As per prior rounding provider, \"on 1/25 nursing had mentioned that patient had refused to undergo dialysis and want to stop care, palliative care was consulted.  Patient and his spouse denied wanting to stop care and wanted ongoing restorative care including full code\"    Monitor, Full Code status    -Needs placement to TCU     Diarrhea, improved, on lactulose for chronic liver disease  - C difficile negative on 1/31. Secondary to lactulose.  - discontinued rectal tube (2/12) as stools more formed    Continue lactulose with goal of 2 to 3 soft bowel " movement in 24 hours     Paroxysmal atrial fibrillation  Chronic anticoagulation, apixaban  Remains in sinus rhythm, on anticoagulation with apixaban indefinitely for stroke prophylaxis.      Monitor rhythm    Continue apixaban anticoagulation    Monitor hemoglobin, see below     Acute anemia  Pt has required intermittent transfusions for on-going anemia.  Anemia most likely secondary to critical illness/chronic disease, chronic renal disease.  Baseline hemoglobin around 7-8  Likely Epo resistance    Transfuse packed red blood cells to keep hemoglobin > 7    Epoetin lfa-epbx use as per nephrology     History PEA arrest   PEA arrest prior to his previous admission and 1 episode of PEA associated with desaturation on 12/20.  No structural cardiac disease.     Stable, continue cardiac Monitoring     Chronic Hypotension  In the past has developed baseline hypotension with cirrhosis and prolonged critical illness.  On hemodialysis.  Receiving midodrine and albumin during dailysis    Continue midodrine, as per nephrology      History of seizure   After hypoxic event, in the context of baseline multifactorial encephalopathy secondary to his ongoing critical illness and prior cardiac arrests and prior strokes and cirrhosis with hepatic encephalopathy. MRI of head of 12/20/22 reveals no PRES or leptomeningeal enhancement but scattered.  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   No recent seizure activity.    Continue levetiracetam, lacosamide     Seizure precautions    Outpatient follow-up with neurology, consult inpatient if needed     # ESRD  # Anemia due to renal disease  # Hypotension during dialysis  -Underwent dialysis  on 2/22/23 and was hypotensive and received midodrine and albumin  Receiving albumin with dialysis       Diabetes mellitus type 2  *Hemoglobin A1c on November 2022 was 4.7  *By report, on Lantus insulin in the past.  Blood sugar checks and sliding  scale insulin previously discontinued as has been stable without insulin needs    Monitor with periodic blood sugar checks as needed     Severe malnutrition, protein and calorie type  Moderate dysphagia  G-tube feedings    Continue with tube feeds via PEG tube    IR replaced the PEG tube on 2/8/2023, monitor    Nutritionist consulted and following for tube feeds    SLP following as well. Oral diet as per speech and language therapy (SLP)     2/20 Nutrition following for tube feeds    Underwent video swallow on 2/23/22     Anxiety  Fluoxetine was discontinued as per psychiatry recommendation on 2/2 (see consult note for details)     Stable, monitor; comfort and reassurance offered during visit    Psychiatry follow-up       Diet: Room Service  Adult Formula Bolus Feeding: Novasource Renal; Route: Gastrostomy; 3 times daily; Volume per Bolus: 240; mL(s); EVERYDAY please bolus at 80 mL/hr x3 hrs at 6pm. Add additional bolus feedings x2 at 80 mL/hr x 3 hrs as back up bolus feeding after br...  Snacks/Supplements Adult: Other; Gelatein+ with lunch and magic cup with dinner (RD); With Meals  Soft & Bite Sized Diet (level 6) Mildly Thick (level 2) (SMALL/SINGLE sips); No Straws    DVT Prophylaxis: DOAC  Nixon Catheter: Not present  Lines: PRESENT      CVC Double Lumen Right External jugular Tunneled-Site Assessment: WDL      Cardiac Monitoring: None  Code Status: Full Code      Clinically Significant Risk Factors           # Hypercalcemia: corrected calcium is >10.1, will monitor as appropriate    # Hypoalbuminemia: Lowest albumin = 1.9 g/dL at 1/23/2023  6:38 AM, will monitor as appropriate            # Severe Malnutrition: based on nutrition assessment        Disposition Plan     Expected Discharge Date: 02/28/2023, 12:00 PM    Destination: inpatient rehabilitation facility  Discharge Comments: admitted on 1-21 from [facility], Dialysis pt MWF. Will need placement          Pako Tan MD  Hospitalist Service  M  [facility]  Securely message with Matth[employer] (more info)  Text page via [employer] Paging/Directory   ______________________________________________________________________    Interval History   Feels stable.  Complains of chronic low back pain    Physical Exam   /76 (BP Location: Right leg)   Pulse 88   Temp 97.4  F (36.3  C) (Axillary)   Resp 18   Wt 84.2 kg (185 lb 10 oz)   SpO2 94%   BMI 25.18 kg/m    Gen- pleasant  lying in bed  HEENT- NAD, EFRAIN  Neck- supple  CVS- I+II+ no m/r/g  RS- CTAB, decreased at base   Abdo- soft, no tenderness . No g/r/r   Ext- no edema     Medical Decision Making       50 MINUTES SPENT BY ME on the date of service doing chart review, history, exam, documentation & further activities per the note.      Data   ------------------------- PAST 24 HR DATA REVIEWED -----------------------------------------------    I have personally reviewed the following data over the past 24 hrs:    3.4 (L)  \   9.3 (L)   / 133 (L)     137 99 21.6 (H) /  82   4.2 29 2.49 (H) \       Imaging results reviewed over the past 24 hrs:   No results found for this or any previous visit (from the past 24 hour(s)).

## 2023-02-27 NOTE — PLAN OF CARE
"SLP: dysphagia tx completed, see \"IP SLP Eval/Treat\" flowsheet for full details.    Recommendations: Upgrade to regular solids, continue mildly thick liquids (IDDSI 7, 2) - small/single sips, straws ok if one sip at a time, complete upright position, slow rate of intake, liquid wash PRN. Recommend close monitoring with PO for first meal/dinner tonight. Dinner ordered for 5:30 tonight, breakfast for 8:15 tomorrow AM with plan for SLP to f/u at that time.       "

## 2023-02-27 NOTE — PLAN OF CARE
Goal Outcome Evaluation:      Plan of Care Reviewed With: patient    Overall Patient Progress: no change    Outcome Evaluation: Pt reports continued good appetite, taking supplements daily. Suspect would still benefit from at least 1 bolus TF daily for additional kcal/protein which was discussed with patient today, however, he does not feel like he needs them. Today he is requesting a regular diet (sticky left for MD) to allow for more food options. Ordered re-weigh as well.

## 2023-02-27 NOTE — PROGRESS NOTES
Potassium   Date Value Ref Range Status   02/25/2023 4.5 3.4 - 5.3 mmol/L Final   12/29/2022 3.4 3.4 - 5.3 mmol/L Final   04/20/2020 3.9 3.4 - 5.3 mmol/L Final     Potassium POCT   Date Value Ref Range Status   01/19/2023 3.6 3.5 - 5.0 mmol/L Final     Hemoglobin   Date Value Ref Range Status   02/25/2023 8.9 (L) 13.3 - 17.7 g/dL Final   04/20/2020 14.3 13.3 - 17.7 g/dL Final     Creatinine   Date Value Ref Range Status   02/25/2023 3.38 (H) 0.67 - 1.17 mg/dL Final   04/20/2020 1.06 0.66 - 1.25 mg/dL Final     Urea Nitrogen   Date Value Ref Range Status   02/25/2023 27.6 (H) 6.0 - 20.0 mg/dL Final   12/29/2022 46 (H) 7 - 30 mg/dL Final   04/20/2020 23 7 - 30 mg/dL Final     Sodium   Date Value Ref Range Status   02/25/2023 134 (L) 136 - 145 mmol/L Final   04/20/2020 139 133 - 144 mmol/L Final     INR   Date Value Ref Range Status   12/21/2022 1.36 (H) 0.85 - 1.15 Final   10/07/2019 1.20 (H) 0.86 - 1.14 Final       DIALYSIS PROCEDURE NOTE  Hepatitis status of previous patient on machine log was checked and verified ok to use with this patients hepatitis status.  Patient dialyzed for 3 hrs. on a K3 bath with a net fluid removal of  1L.  A BFR of 250-350 ml/min was obtained via a CVC.      The treatment plan was discussed with Dr. Fitzpatrick during the treatment.    Total heparin received during the treatment: 0 units.     Line flushed, clamped and capped with heparin 1:1000 1.9 mL (1900 units) per lumen    Meds  given: epogen, albumin   Complications: none      Person educated: pre-tx. Knowledge base minimal. Barriers to learning: none. Educated on procedure via oral mode. Patient verbalized understanding. Pt prefers verbal education style.     ICEBOAT? Timeout performed pre-treatment  I: Patient was identified using 2 identifiers  C:  Consent Signed Yes  E: Equipment preventative maintenance is current and dialysis delivery system OK to use  B: Hepatitis B Surface Antigen: Negative; Draw Date: 02/02/2023      Hepatitis  B Surface Antibody: Suseptible; Draw Date: 02/02/23  O: Dialysis orders present and complete prior to treatment  A: Vascular access verified and assessed prior to treatment  T: Treatment was performed at a clinically appropriate time  ?: Patient was allowed to ask questions and address concerns prior to treatment  See Adult Hemodialysis flowsheet in [facility] for further details and post assessment.  Machine water alarm in place and functioning. Transducer pods intact and checked every 15min.   Pt returned via bed.  Chlorine/Chloramine water system checked every 4 hours.  Outpatient Dialysis at *not established    Patient repositioned every 2 hours during the treatment.  Post treatment report given to VI Calderón RN regarding 1L of fluid removed, last BP of 99/68, and patient pain rating of 8/10.

## 2023-02-27 NOTE — PROGRESS NOTES
CLINICAL NUTRITION SERVICES - REASSESSMENT NOTE      RECOMMENDATIONS FOR MD/PROVIDER TO ORDER:   Pt requests Regular diet - wants to eat sandwiches and have more options. States that he disagrees with SLP evaluation and recommendations. ?SLP trial of additional requested foods vs liberalizing diet to improve oral intake if safe    Pt also does not feel that he needs any bolus' of TF. RD discussed his increased and high calorie/protein needs and that 1-2 TF bolus' per day would be beneficial even if he continues to eat like he does. Per previous discussion with wife last week, family is in support of utilizing his feeding tube more and provides additional motivation/encouragement when they are here      Recommendations Ordered by Registered Dietitian (RD):   Recommend continue PM bolus daily based on reviewed oral intake from chart. Pt has been declining these feedings over the weekend, however     Encouraged consumption of 3 meals/day plus protein supplements at 75% completion to avoid need for TF bolus (pending wt trending, etc). Pt agreeable and feels he is already doing this     Re-weigh      Malnutrition:  (1/22)  % Weight Loss:  > 5% in 1 month (severe malnutrition)  % Intake:  </= 50% for >/= 5 days (severe malnutrition)- suspected   Subcutaneous Fat Loss:  Orbital region moderate depletion, Upper arm region moderate-severe depletion and Thoracic region moderate-severe depletion  Muscle Loss:  Temporal region moderate depletion, Clavicle bone region severe depletion, Acromion bone region severe depletion, Patellar region moderate depletion and Anterior thigh region moderate-severe depletion  Fluid Retention:  Trace     Malnutrition Diagnosis: Severe malnutrition  In Context of:  Acute on Chronic illness or disease       EVALUATION OF PROGRESS TOWARD GOALS   Diet:  Soft & Bite sized, mildly thick  Gelatein+ w/ lunch  Magic Cup w/ dinner     Nutrition Support:    Type of Feeding Tube: G-tube  Enteral Frequency:  " Continuous  Enteral Regimen: Novasource Renal at 80 mL/hr x 3 hours after dinner + PRN back up bolus for breakfast or lunch if consumes <50% of those meals  Per Bolus Provisions: 240 mL formula = 480 kcal, 22 g protein, 44 g CHO, 0 g fiber and 172 mL free water  Free Water Flush: 100 mL before and after each feeding    Intake/Tolerance:   Patient currently in dialysis - visited at bedside. He states that his appetite is good and he had breakfast before leaving his room. Pt feels that he has been doing very well with PO and in turn does not need any TF. He does not 'think' that he received any TF bolus over the weekend.  We discussed his emesis that was recorded in the chart from Thurs/Fri last week and pt denies this -- stating he was just coughing a lot and \"spiting\"   Endorses \"routinely\" consuming the Gelatein and Magic Cup supplements daily     PO: Flowsheets record 50-75% meal consumption for 2-3 meals/day over the past 3 days. Since no official calorie count active (see previous RD notes for calorie counts done 1/23-1/25), difficult to accurately assess kcal/protein intake. However, total kcal/protein ordered per day on average (if pt was eating 100%) equals ~1942 kcal and 81 grams protein meeting 77% estimated energy needs and 80% estimated protein needs (again, he has been eating only 50-75% of these meals overall)    TF: Per current orders, PM dinner bolus x1 had been given each night betweem 2/20-2/22. TF bolus held 2/23-2/24 for small emesis recorded both nights. No documentation in flowsheets or RN notes about evening bolus TF given 2/25-2/26 (weekend)    Labs reviewed: Na 134 (L), K 4.5, Phos 4.3, BUN 27.6 (H), Cr 3.38 (H)  Recent Labs   Lab 02/25/23  1448 02/24/23  0921 02/22/23  1312 02/21/23  0744   * [phone] 85     Medications reviewed: PhosLo TID, lactulose (scheduled BID, given most days only once), Nephrocaps MVI    Stooling:  - 2/23: BM x5  - 2/24: BM x8  - 2/25: BM x13  - 2/26: BM " "x6    Wt: 181 lbs 7.02 oz - lowest wt this admission on 8/22, down 13 lbs since admission ~1 month ago (7% body wt) -- potential for some inaccuracy based on fluid shifts w/ dialysis. Re-weigh ordered today    Vitals:    01/31/23 0510 02/10/23 0425 02/11/23 0339 02/17/23 0500   Weight: 84.5 kg (186 lb 4.6 oz) 84.6 kg (186 lb 8.2 oz) 86 kg (189 lb 9.5 oz) 87.6 kg (193 lb 2 oz)    02/22/23 0500   Weight: 82.3 kg (181 lb 7 oz)       ASSESSED NUTRITION NEEDS:  Dosing Weight 82.4 kg   Estimated Energy Needs: 8503-4278 kcals (30-35 Kcal/Kg)  Justification: repletion and HD  Estimated Protein Needs: + grams protein (1.2-1.5+ g pro/Kg)  Justification: Repletion and dialysis      NEW FINDINGS:   Chart reviewed   Continues on HD M-WIESHA BILLY note over weekend states \"Need to discuss with nutrition tomorrow about weaning off tube feeds and encourage oral nutrition based on his nutrition status\" - please page as needed  Placement pending     Previous Goals:   Patient will consume >50% meals 2-3x/day and receive at least 1 bolus feeding/day   Evaluation: Not met    Previous Nutrition Diagnosis:   Inadequate oral intake related to variable PO w/ appetite and difficulty swallowing as evidenced by need for back up bolus 1x daily.   Evaluation: No change      CURRENT NUTRITION DIAGNOSIS  Inadequate oral intake related to variable PO w/ appetite and restricted diet w/ dysphagia as evidenced by need for back up bolus 1x daily.     INTERVENTIONS  Recommendations / Nutrition Prescription  Continue TF order as written - recommend PM bolus daily based on reviewed oral intake from chart. Pt has been declining these feedings over the weekend, however     Encouraged consumption of 3 meals/day plus protein supplements at 75% completion to avoid need for TF bolus (pending wt trending, etc). Pt agreeable and feels he is already doing this     Re-weigh     Implementation  Composition of Meals and Snacks- reviewed need for additional calories " and protein at meal times     Goals  Patient will consume 75% meals TID plus supplements daily vs receive at least 1 bolus feeding/day   Weight maintenance >82 kg       MONITORING AND EVALUATION:  Progress towards goals will be monitored and evaluated per protocol and Practice Guidelines      Deann Rainey RD, LD  Clinical Dietitian

## 2023-02-28 ENCOUNTER — APPOINTMENT (OUTPATIENT)
Dept: SPEECH THERAPY | Facility: CLINIC | Age: 58
DRG: 981 | End: 2023-02-28
Attending: STUDENT IN AN ORGANIZED HEALTH CARE EDUCATION/TRAINING PROGRAM
Payer: COMMERCIAL

## 2023-02-28 ENCOUNTER — APPOINTMENT (OUTPATIENT)
Dept: PHYSICAL THERAPY | Facility: CLINIC | Age: 58
DRG: 981 | End: 2023-02-28
Attending: STUDENT IN AN ORGANIZED HEALTH CARE EDUCATION/TRAINING PROGRAM
Payer: COMMERCIAL

## 2023-02-28 PROCEDURE — 250N000013 HC RX MED GY IP 250 OP 250 PS 637: Performed by: STUDENT IN AN ORGANIZED HEALTH CARE EDUCATION/TRAINING PROGRAM

## 2023-02-28 PROCEDURE — 250N000012 HC RX MED GY IP 250 OP 636 PS 637: Performed by: STUDENT IN AN ORGANIZED HEALTH CARE EDUCATION/TRAINING PROGRAM

## 2023-02-28 PROCEDURE — 97530 THERAPEUTIC ACTIVITIES: CPT | Mod: GP

## 2023-02-28 PROCEDURE — 99233 SBSQ HOSP IP/OBS HIGH 50: CPT | Performed by: INTERNAL MEDICINE

## 2023-02-28 PROCEDURE — 250N000013 HC RX MED GY IP 250 OP 250 PS 637

## 2023-02-28 PROCEDURE — 99232 SBSQ HOSP IP/OBS MODERATE 35: CPT | Performed by: INTERNAL MEDICINE

## 2023-02-28 PROCEDURE — 99232 SBSQ HOSP IP/OBS MODERATE 35: CPT

## 2023-02-28 PROCEDURE — 250N000013 HC RX MED GY IP 250 OP 250 PS 637: Performed by: INTERNAL MEDICINE

## 2023-02-28 PROCEDURE — 120N000001 HC R&B MED SURG/OB

## 2023-02-28 PROCEDURE — 92526 ORAL FUNCTION THERAPY: CPT | Mod: GN | Performed by: SPEECH-LANGUAGE PATHOLOGIST

## 2023-02-28 PROCEDURE — 250N000009 HC RX 250: Performed by: STUDENT IN AN ORGANIZED HEALTH CARE EDUCATION/TRAINING PROGRAM

## 2023-02-28 RX ORDER — OLANZAPINE 5 MG/1
5 TABLET, ORALLY DISINTEGRATING ORAL 2 TIMES DAILY
Status: DISCONTINUED | OUTPATIENT
Start: 2023-02-28 | End: 2023-04-02

## 2023-02-28 RX ORDER — LACOSAMIDE 50 MG/1
100 TABLET ORAL 2 TIMES DAILY
Status: DISCONTINUED | OUTPATIENT
Start: 2023-02-28 | End: 2023-02-28

## 2023-02-28 RX ORDER — LEVETIRACETAM 500 MG/1
1000 TABLET ORAL EVERY 24 HOURS
Status: DISCONTINUED | OUTPATIENT
Start: 2023-02-28 | End: 2023-04-28 | Stop reason: HOSPADM

## 2023-02-28 RX ORDER — LACOSAMIDE 100 MG/1
100 TABLET ORAL EVERY 12 HOURS
Status: DISCONTINUED | OUTPATIENT
Start: 2023-03-01 | End: 2023-04-28 | Stop reason: HOSPADM

## 2023-02-28 RX ORDER — TACROLIMUS 1 MG/1
1 CAPSULE ORAL EVERY 12 HOURS
Status: DISCONTINUED | OUTPATIENT
Start: 2023-02-28 | End: 2023-04-28 | Stop reason: HOSPADM

## 2023-02-28 RX ORDER — PANTOPRAZOLE SODIUM 40 MG/1
40 TABLET, DELAYED RELEASE ORAL
Status: DISCONTINUED | OUTPATIENT
Start: 2023-03-01 | End: 2023-03-01

## 2023-02-28 RX ADMIN — LACOSAMIDE 100 MG: 100 TABLET, FILM COATED ORAL at 23:42

## 2023-02-28 RX ADMIN — Medication 40 MG: at 09:01

## 2023-02-28 RX ADMIN — WHITE PETROLATUM: 1.75 OINTMENT TOPICAL at 09:15

## 2023-02-28 RX ADMIN — CALCIUM ACETATE 667 MG: 667 CAPSULE ORAL at 17:55

## 2023-02-28 RX ADMIN — MIDODRINE HYDROCHLORIDE 10 MG: 5 TABLET ORAL at 12:17

## 2023-02-28 RX ADMIN — NYSTATIN 500000 UNITS: 100000 SUSPENSION ORAL at 09:01

## 2023-02-28 RX ADMIN — MIDODRINE HYDROCHLORIDE 10 MG: 5 TABLET ORAL at 09:00

## 2023-02-28 RX ADMIN — GUAIFENESIN 600 MG: 600 TABLET, EXTENDED RELEASE ORAL at 21:29

## 2023-02-28 RX ADMIN — OLANZAPINE 5 MG: 5 TABLET, ORALLY DISINTEGRATING ORAL at 21:29

## 2023-02-28 RX ADMIN — RIFAXIMIN 550 MG: 550 TABLET ORAL at 09:00

## 2023-02-28 RX ADMIN — APIXABAN 5 MG: 5 TABLET, FILM COATED ORAL at 21:29

## 2023-02-28 RX ADMIN — RIFAXIMIN 550 MG: 550 TABLET ORAL at 21:29

## 2023-02-28 RX ADMIN — LEVETIRACETAM 1000 MG: 500 TABLET, FILM COATED ORAL at 21:35

## 2023-02-28 RX ADMIN — LACOSAMIDE 100 MG: 10 SOLUTION ORAL at 09:01

## 2023-02-28 RX ADMIN — TACROLIMUS 1 MG: 5 CAPSULE ORAL at 09:01

## 2023-02-28 RX ADMIN — CALCIUM ACETATE 667 MG: 667 CAPSULE ORAL at 12:18

## 2023-02-28 RX ADMIN — TACROLIMUS 1 MG: 1 CAPSULE ORAL at 21:29

## 2023-02-28 RX ADMIN — CALCIUM ACETATE 667 MG: 667 CAPSULE ORAL at 09:01

## 2023-02-28 RX ADMIN — ACETAMINOPHEN 650 MG: 325 TABLET, FILM COATED ORAL at 21:28

## 2023-02-28 RX ADMIN — Medication 50 MCG: at 09:00

## 2023-02-28 RX ADMIN — LACOSAMIDE 100 MG: 10 SOLUTION ORAL at 00:35

## 2023-02-28 RX ADMIN — MIDODRINE HYDROCHLORIDE 10 MG: 5 TABLET ORAL at 17:55

## 2023-02-28 RX ADMIN — Medication 1 CAPSULE: at 09:00

## 2023-02-28 RX ADMIN — NYSTATIN 500000 UNITS: 100000 SUSPENSION ORAL at 12:18

## 2023-02-28 RX ADMIN — RAMELTEON 8 MG: 8 TABLET, FILM COATED ORAL at 21:29

## 2023-02-28 RX ADMIN — NYSTATIN 500000 UNITS: 100000 SUSPENSION ORAL at 17:55

## 2023-02-28 RX ADMIN — ACETAMINOPHEN 650 MG: 325 TABLET, FILM COATED ORAL at 15:38

## 2023-02-28 RX ADMIN — Medication 40 MG: at 15:38

## 2023-02-28 RX ADMIN — GUAIFENESIN 600 MG: 600 TABLET, EXTENDED RELEASE ORAL at 09:01

## 2023-02-28 RX ADMIN — APIXABAN 5 MG: 5 TABLET, FILM COATED ORAL at 09:01

## 2023-02-28 RX ADMIN — FOLIC ACID 1 MG: 1 TABLET ORAL at 09:01

## 2023-02-28 ASSESSMENT — ACTIVITIES OF DAILY LIVING (ADL)
ADLS_ACUITY_SCORE: 62
ADLS_ACUITY_SCORE: 58
ADLS_ACUITY_SCORE: 62
ADLS_ACUITY_SCORE: 62
ADLS_ACUITY_SCORE: 58
ADLS_ACUITY_SCORE: 60
ADLS_ACUITY_SCORE: 58
ADLS_ACUITY_SCORE: 60
ADLS_ACUITY_SCORE: 58
ADLS_ACUITY_SCORE: 60
ADLS_ACUITY_SCORE: 58
ADLS_ACUITY_SCORE: 64

## 2023-02-28 NOTE — PROGRESS NOTES
[facility]    Medicine Progress Note - Hospitalist Service    Date of Admission:  1/21/2023    Assessment & Plan   Blanco Osborne is a 57 year-old male admitted on 1/21/2023 as a transfer from [facility] for evaluation of loculated pleural effusion. He has extensive PMH including h/o liver transplant 2016 due to alcohol abuse, pAF on chronic anticoagulation, history of diabetes mellitus type 2, CVA, hypertension, CHRISTIAN on BiPAP.  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 He was recovering in [facility] and was transferred to [facility] for consideration of chest tube placement and possible intrapleural lysis for worsening effusion.     Acute metabolic encephalopathy with delirium, multifactorial  Hepatic encephalopathy  Chronic liver disease, history of liver transplant 2016  End-stage renal disease (ESRD), on hemodialysis (HD)  History of seizure, on Keppra and Vimpat  Waxing and waning mentation, coinciding with lactulose being held at [facility]  Earlier in hospital stay, remained confused and had pulled out tracheostomy tube, PICC line and pulled his dialysis catheter.  Lines replaced.  Palliative care consult 1/26, see note.  * Psychiatry consulted 2/2, see note, follow-up requested.    On/off fluctuating mentation, recently improving     Continue to reorient as needed; maintain normal day/night, sleep wake cycles; minimize sedating/altering medications as able    Continue Lactulose to 10 mg BID, monitor for symptoms to have 2-3 Bms    Zyprexa 5 mg at bedtime scheduled.  Melatonin 3 mg p.o. at bedtime scheduled    Olanzapine BID as needed for severe agitation or anxiety; monitor for side effets    Mittens/sitter as needed; minimize use of restraints as able    Ongoing hemodialysis, as  per nephrology, on M-W-F dialysis schedule; ongoing nephrology consult follow-up appreciated    2/20/23 Off restraints.Mentation has improved    2/21/23 -Fall this morningWorsening.Delirium. Ramelteon started.Psyciatry consult appreciated  2/22/23 Improved mentation     2/28: Continues with one-to-one sitter.  Await further psychiatry evaluation    Bilateral pleural effusions   Acute respiratory failure requiring tracheostomy  - resolved    Pneumonia  - resolved   ARDS  - resolved    Right pneumothorax - resolved   *Initial event was parainfluenza and strep pneumo pneumonia back in November 2022. Treated for ARDS. Had failed extubation x2 and tracheostomy performed on previous hospitalization. *Had additional complications of bilateral pneumothoraces as well as hydropneumothorax- pleural fluid grew candida parapsilosis  *Completed 4-week antifungal treatment. While in LTACH he was successfully weaned from the ventilator.  *Recently there were concern for worsening effusion while at [facility] and had thoracentesis 01/13 which returned exudative without growth on cultures. CT chest/abdomen/pelvis on 01/18 demonstrated worsening effusions and concerns for infected parapneumonic effusions with possible SBP.  Multiple pulmonologist at [facility] reviewed CT scan recommended transfer to [facility] for consideration of chest tube placement and possible intrapleural lysis  *Thoracic surgery (Dr. Jackson) consulted during admission   *CT chest 1/22, small effusions on imaging and so chest tube was not inserted.   ID consulted, see note 2/10.  *Patient pulled out his tracheostomy tube on the night 2/1-2/2 and is tolerating well without increased shortness of breath persistent cough or worsening hypoxia  * Chest x-ray 2/3 with cardiomegaly, chronic small bilateral pleural effusions, no pulmonary edema; right internal jugular dialysis catheter in place    Monitor respiratory status, recently stable on room air    Encourage incentive  "spirometry as able    Wound care previously consulted for tracheostomy site care, monitor for signs and symptoms of infection    - Now Stable on RA      S/p liver transplant 2016   Chronic immunosuppression, on tacrolimus  Cirrhosis with ascites  Subacute bacterial peritonitis (SBP), resolved  *Main manifestations of this are hepatic encephalopathy and ascites.  Hepatologist at [place] felt that most likely reason for the cirrhosis was metabolic syndrome or alcohol intake.  There was no evidence of rejection on biopsy and there was some evidence of regeneration. Paracentesis done on 12/22/2022 showed lactobacillus indicating SBP, treated with Unasyn and Augmentin, finished 2-week course 1/12/2023.  Requires large-volume paracentesis periodically for recurring ascites.    *Paracentesis 1/13/2023 yielded about 7500 cc. Cultures NGTD.  Most recent paracentesis on 1/24 with 4.8 L fluid removal    Continue intermittent paracentesis as needed if develops symptomatic ascites    Monitor for signs and symptoms of recurrent spontaneous bacterial peritonitis     Further treatment for recurrent ascites with TIPS deferred to his Liver Transplant team and/or Hepatology, he has an appointment on 4/21/2023 with Hepatology, Dr. Simms    Continue Tacrolimus 1 mg BID.    Continue rifaximin 550 mg BID    Continue lactulose as ordered, titrate as needed to have 2-3 bms    GI consult as needed in hospital for new acute issues, otherwise as outpatient     Goals of care   As per prior rounding provider, \"on 1/25 nursing had mentioned that patient had refused to undergo dialysis and want to stop care, palliative care was consulted.  Patient and his spouse denied wanting to stop care and wanted ongoing restorative care including full code\"    Monitor, Full Code status    -Needs placement to TCU     Diarrhea, improved, on lactulose for chronic liver disease  - C difficile negative on 1/31. Secondary to lactulose.  - discontinued rectal tube " (2/12) as stools more formed    Continue lactulose with goal of 2 to 3 soft bowel movement in 24 hours     Paroxysmal atrial fibrillation  Chronic anticoagulation, apixaban  Remains in sinus rhythm, on anticoagulation with apixaban indefinitely for stroke prophylaxis.      Monitor rhythm    Continue apixaban anticoagulation    Monitor hemoglobin, see below     Acute anemia  Pt has required intermittent transfusions for on-going anemia.  Anemia most likely secondary to critical illness/chronic disease, chronic renal disease.  Baseline hemoglobin around 7-8  Likely Epo resistance    Transfuse packed red blood cells to keep hemoglobin > 7    Epoetin lfa-epbx use as per nephrology     History PEA arrest   PEA arrest prior to his previous admission and 1 episode of PEA associated with desaturation on 12/20.  No structural cardiac disease.     Stable, continue cardiac Monitoring     Chronic Hypotension  In the past has developed baseline hypotension with cirrhosis and prolonged critical illness.  On hemodialysis.  Receiving midodrine and albumin during dailysis    Continue midodrine, as per nephrology      History of seizure   After hypoxic event, in the context of baseline multifactorial encephalopathy secondary to his ongoing critical illness and prior cardiac arrests and prior strokes and cirrhosis with hepatic encephalopathy. MRI of head of 12/20/22 reveals no PRES or leptomeningeal enhancement but scattered.  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   No recent seizure activity.    Continue levetiracetam, lacosamide     Seizure precautions    Outpatient follow-up with neurology, consult inpatient if needed     # ESRD  # Anemia due to renal disease  # Hypotension during dialysis  -Underwent dialysis  on 2/22/23 and was hypotensive and received midodrine and albumin  Receiving albumin with dialysis       Diabetes mellitus type 2  *Hemoglobin A1c on November 2022  was 4.7  *By report, on Lantus insulin in the past.  Blood sugar checks and sliding scale insulin previously discontinued as has been stable without insulin needs    Monitor with periodic blood sugar checks as needed     Severe malnutrition, protein and calorie type  Moderate dysphagia  G-tube feedings    Continue with tube feeds via PEG tube    IR replaced the PEG tube on 2/8/2023, monitor    Nutritionist consulted and following for tube feeds    SLP following as well. Oral diet as per speech and language therapy (SLP)     2/20 Nutrition following for tube feeds    Underwent video swallow on 2/23/22     Anxiety  Fluoxetine was discontinued as per psychiatry recommendation on 2/2 (see consult note for details)     Stable, monitor; comfort and reassurance offered during visit    Psychiatry follow-up       Diet: Room Service  Snacks/Supplements Adult: Other; Gelatein+ with lunch and magic cup with dinner (RD); With Meals  Adult Formula Bolus Feeding: Novasource Renal; Route: Gastrostomy; 3 times daily; Volume per Bolus: 240; mL(s); Continue to encourage once DAILY dinner time bolus at 80 mL/hr x 3 hrs. If patient consumes <75% of breakfast and lunch, encourage back...  Combination Diet Regular Diet; Mildly Thick (level 2)    DVT Prophylaxis: DOAC  Nixon Catheter: Not present  Lines: PRESENT             Cardiac Monitoring: None  Code Status: Full Code      Clinically Significant Risk Factors            # Hypomagnesemia: Lowest Mg = 1.5 mg/dL in last 2 days, will replace as needed   # Hypoalbuminemia: Lowest albumin = 1.9 g/dL at 1/23/2023  6:38 AM, will monitor as appropriate            # Severe Malnutrition: based on nutrition assessment        Disposition Plan      Expected Discharge Date: 03/02/2023, 12:00 PM    Destination: inpatient rehabilitation facility  Discharge Comments: admitted on 1-21 from [facility], Dialysis pt MWF. Will need placement        Pako Tan MD  Hospitalist Service  [facility]  [facility]  Securely message with [employer] (more info)  Text page via [employer] Paging/Directory   ______________________________________________________________________    Interval History   No specific new concerns.  Discussed with RN and bedside sitter.  Continues with impulsivity    Physical Exam   /82 (BP Location: Left arm)   Pulse 88   Temp 97.9  F (36.6  C) (Oral)   Resp 18   Wt 84.2 kg (185 lb 10 oz)   SpO2 92%   BMI 25.18 kg/m    Gen- pleasant  lying in bed  Neck- supple  CVS- I+II+ no m/r/g  RS- CTAB, decreased at base   Abdo- soft, no tenderness. No g/r/r   Ext- no edema     Medical Decision Making       50 MINUTES SPENT BY ME on the date of service doing chart review, history, exam, documentation & further activities per the note.      Data   ------------------------- PAST 24 HR DATA REVIEWED -----------------------------------------------        Imaging results reviewed over the past 24 hrs:   No results found for this or any previous visit (from the past 24 hour(s)).

## 2023-02-28 NOTE — PROGRESS NOTES
[facility]    Nephrology Progress Note    Assessment & Plan     <ESRD, ON HD    ~Tolerating treatments well.     - MWF HD      Interval History     Seen on dialysis. Feeling OK on it. No cramping or hypotension.      Temp: 97.9  F (36.6  C) Temp src: Oral BP: 109/72 Pulse: 89   Resp: 18 SpO2: 92 % O2 Device: None (Room air)      Vitals:    02/17/23 0500 02/22/23 0500 02/27/23 1219   Weight: 87.6 kg (193 lb 2 oz) 82.3 kg (181 lb 7 oz) 84.2 kg (185 lb 10 oz)       Vital Signs with Ranges    Temp:  [97.2  F (36.2  C)-97.9  F (36.6  C)] 97.9  F (36.6  C)  Pulse:  [] 89  Resp:  [16-38] 18  BP: ()/(62-82) 109/72  SpO2:  [92 %-97 %] 92 %    I/O last 3 completed shifts:  In: 880 [P.O.:820; NG/GT:60]  Out: 1000 [Other:1000]    Physical Exam  Constitutional:       General: He is not in acute distress.  HENT:      Mouth/Throat:      Mouth: Mucous membranes are moist.   Neck:      Comments: No JVD  Pulmonary:      Effort: Pulmonary effort is normal. No respiratory distress.   Musculoskeletal:      Right lower leg: No edema.      Left lower leg: No edema.   Neurological:      Mental Status: He is alert.         BP Readings from Last 5 Encounters:   02/27/23 109/72   01/21/23 120/71   12/29/22 (!) 139/94   12/16/22 101/63   12/16/22 117/75        Wt Readings from Last 10 Encounters:   02/27/23 84.2 kg (185 lb 10 oz)   01/21/23 82.4 kg (181 lb 11.2 oz)   12/28/22 96 kg (211 lb 10.3 oz)   12/16/22 100.2 kg (221 lb)   12/16/22 100.2 kg (221 lb)   12/15/22 87 kg (191 lb 12.8 oz)   10/07/19 137.5 kg (303 lb 3.2 oz)   10/04/19 131.5 kg (290 lb)   02/20/19 140.4 kg (309 lb 9.6 oz)   07/10/18 137.5 kg (303 lb 3.2 oz)           Medications           - MEDICATION INSTRUCTIONS for Dialysis Patients -   Does not apply See Admin Instructions     apixaban ANTICOAGULANT  5 mg Oral BID     calcium acetate (phos binder)  667 mg Oral TID w/meals     folic acid  1 mg Oral or Feeding Tube Daily      guaiFENesin  600 mg Oral BID     lacosamide  100 mg Oral or Feeding Tube BID     lactulose  10 g Oral BID     levETIRAcetam  1,000 mg Oral or Feeding Tube Q24H     lidocaine  1 patch Transdermal Q24H     lidocaine   Transdermal Q8H BIJU     midodrine  10 mg Oral or Feeding Tube TID w/meals     mineral oil-hydrophilic petrolatum   Topical Daily     multivitamin RENAL  1 capsule Oral Daily     nystatin  500,000 Units Swish & Spit 4x Daily     OLANZapine zydis  5 mg Oral At Bedtime     pantoprazole  40 mg Oral or Feeding Tube BID AC     ramelteon  8 mg Oral QPM     rifaximin  550 mg Oral or Feeding Tube BID     sodium chloride (PF)  10-30 mL Intracatheter Q8H     sodium chloride (PF)  3 mL Intracatheter Q8H     tacrolimus  1 mg Oral BID IS     cholecalciferol  50 mcg Oral Daily       Data     UA RESULTS:  Recent Labs   Lab Test 11/25/22  1859 10/31/17  1038   COLOR Yellow [place]   APPEARANCE Slightly Cloudy* Slightly Cloudy   URINEGLC Negative Negative   URINEBILI Negative Negative   URINEKETONE Negative 5*   SG 1.017 1.021   UBLD Moderate* Negative   URINEPH 5.5 5.0   PROTEIN 70* Negative   NITRITE Negative Negative   LEUKEST Small* Negative   RBCU  --  <1   WBCU  --  1      BMP  Recent Labs   Lab 02/27/23  1149 02/25/23  1448 02/24/23  0921 02/22/23  1312    134*  --  135*   POTASSIUM 4.2 4.5  --  3.5   CHLORIDE 99 94*  --  98   KELLY 8.6 9.5  --  8.6   CO2 29 31*  --  28   BUN 21.6* 27.6*  --  23.4*   CR 2.49* 3.38*  --  2.40*   GLC 82 111* 91 90     Phos@LABRCNTIPR(phos:4)  CBC)  Recent Labs   Lab 02/27/23  1149 02/25/23  1448 02/22/23  1025 02/21/23  0851   WBC 3.4*  --   --  4.6   HGB 9.3* 8.9* 8.2* 8.5*   HCT 32.7*  --   --  28.5*   *  --   --  101*   *  --   --  129*     Lab Results   Component Value Date    ALBUMIN 2.8 02/25/2023    ALBUMIN 2.4 02/22/2023    ALBUMIN 2.3 02/17/2023    ALBUMIN 1.8 12/29/2022    ALBUMIN 1.7 12/28/2022    ALBUMIN 2.1 12/27/2022    ALBUMIN 3.9 04/20/2020    ALBUMIN  3.8 10/07/2019    ALBUMIN 3.8 02/20/2019        Recent Labs   Lab 02/27/23  1149   HGB 9.3*   HCT 32.7*   *       No lab results found in last 7 days.    Invalid input(s): BILIRUBININDIRECT  No lab results found in last 7 days.  25 OH Vit D2   Date Value Ref Range Status   09/24/2016 <5 ug/L Final     25 OH Vitamin D2   Date Value Ref Range Status   02/16/2023 <5 ug/L Final     25 OH Vit D3   Date Value Ref Range Status   09/24/2016 8 ug/L Final     25 OH Vitamin D3   Date Value Ref Range Status   02/16/2023 37 ug/L Final     25 OH Vit D total   Date Value Ref Range Status   09/24/2016 (L) 20 - 75 ug/L Final    <13  Season, race, dietary intake, and treatment affect the concentration of   25-hydroxy-Vitamin D. Values may decrease during winter months and increase   during summer months. Values 20-29 ug/L may indicate Vitamin D insufficiency   and values <20 ug/L may indicate Vitamin D deficiency.   This test was developed and its performance characteristics determined by the   [facility],  Special Chemistry Laboratory. It has   not been cleared or approved by the FDA. The laboratory is regulated under CLIA   as qualified to perform high-complexity testing. This test is used for clinical   purposes. It should not be regarded as investigational or for research.       25 OH Vit D Total   Date Value Ref Range Status   02/16/2023 <42 20 - 75 ug/L Final     Comment:     Season, race, dietary intake, and treatment affect the concentration of 25-hydroxy-Vitamin D. Values may decrease during winter months and increase during summer months. Values 20-29 ug/L may indicate Vitamin D insufficiency and values <20 ug/L may indicate Vitamin D deficiency.     No results for input(s): PTHI in the last 168 hours.    Attestation:   I have reviewed today's relevant vital signs, notes, medications, labs and imaging.

## 2023-02-28 NOTE — PLAN OF CARE
Goal Outcome Evaluation:     Cognitive Concerns/ Orientation: Pt confused, oriented to self/place, forgetful   BEHAVIOR & AGGRESSION TOOL COLOR: Green, calm/cooperative            ABNL VS/O2: VSS on RA  Labs:Hb 8.9. Mag 1.5, replaced, recheck at 2200-2.6  MOBILITY: Assist-2, lift; able to turn/repo himself in bed, Seizure precautions  PAIN MANAGMENT: Tylenol given x1 for back pain, also has Lido patch on lower back.   DIET: Advanced to regular diet from soft bite sized (level 6), continuing mildly thick liquids (level 2); improved appetite, continue to refuse TID TF boluses.   BOWEL/BLADDER: Continent of bowel, using bed pan and commode, BM x1 this shift. Pt on hemodialysis  DRAIN/DEVICES: L PIV saline locked; R external jugular tunneled CVC for hemodialysis; Double lumen PEG tube, clamped and currently used for liquid meds, pt has been taking pills po now.  SKIN: Jaundiced, Scattered scabs and bruising. Blanchable redness to coccyx. Light redness left elbow, Mepilex in place.  D/C DATE: Discharge pending placement  OTHER IMPORTANT INFO: LS-diminished, occ nonproductive cough, no SOB, S/p Dialysis today.

## 2023-02-28 NOTE — CONSULTS
"Triage and Transition - Consult and Liaison     Blanco S Dylon  February 28, 2023    Psychiatry consult acknowledged.     Spoke to charge RN this morning. Noting that Pt is struggling with restlessness, impulsivity, difficulty reseting or sleeping.     Update psychiatric medication provider to comment on medications.     Non-pharmacological interventions to attempt could include:     DEMENTIA INTERVENTIONS        Non-pharmacological interventions include but are not limited to:   a. Lavender   b. Warm Blankets and/or weighted blankets   c. Activities of interest currently or in the past   d. Calming music   e. 5,4,3,2,1            Dementia Basics  Use a consistent positive physical approach  - gesture & greet by name   - offer your hand & make eye contact   - approach slowly within visual range   - shake hands & maintain hand-under-hand   - move to the side of the patient  - get to eye level & respect intimate space   - wait for acknowledgement     How you help      Sight or Visual cues     Verbal or Auditory cues     Touch or Tactile cues     USE VISUAL combined VERBAL (gesture/point)   -  It s about time for     -  Let s go this way     -  Here are your socks       DON T ask questions you DON T want to hear the answer to  ( Do you want to  ,  Are you ready to  , \"I need you to  )     Acknowledge the response/reaction to your info      USE THEIR WORDS (with a ? OR in agreement)     LIMIT your words - Keep it SIMPLE     WAIT!!!!      ID common interest     Say something nice about the person or their place     Share something about yourself and encourage the person to share back     Follow their lead - listen actively     Use some of their words to keep the flow going     Remember its the FIRST TIME!    Do s     Go with the FLOW     Use SUPPORTIVE communication techniques   - Use objects and the environment   - Give examples   - Use gestures and pointing   - Acknowledge & accept emotions   - Use empathy & " Validation   - Use familiar phrases or known interests   - Respect  values  and  beliefs  - avoid the negative     DON Ts     Try to CONTROL the FLOW   - Give up reality orientation and BIG lies   - Do not correct errors   - Offer info if asked, monitoring the emotional state     Try to STOP the FLOW   - Don t reject topics   - Don t try to distract UNTIL you are well connected   - Keep VISUAL cues positive       A Positive Physical Approach for Someone with Dementia   1. Knock on door or table - to get attention if the person is not looking at you & get permission to enter or approach     2. Open palm near face and smile - look friendly and give the person a visual cue make eye contact     3. Call the person by name OR at least say  Hi!      4. Move your hand out from an open hand near face to a greeting handshake position     5. Approach the person from the front - notice their reaction to your outstretched hand - start approaching or let the person come to you, if s/he likes to be in control     6. Move slowly - one step/second, stand tall, don t crouch down or lean in as you move toward the person     7. Move toward the right side of the person and offer your hand - give the person time to look at your hand and reach for it, if s/he is doing something else - offer, don t force     8. Stand to the side of the person at arm s length - respect personal space & be supportive not confrontational     9. Shake hands with the person - make eye contact while shaking     10. Slide your hand from a  shake  position to hand-under-hand position - for safety,     connection, and function     11. Give your name & greet -  I m (name). It s good to see you!      12. Get to the person s level to talk - sit, squat, or kneel if the person is seated and stand beside the person if s/he is standing     13. NOW, deliver your message        Approaching When The Person is DISTRESSED!     TWO CHANGES -   1. Look concerned not too happy,  "if the person is upset     2. Let the person move toward you, keeping your body turned  sideways(supportive - not confrontational)     3. After greeting  try one of two options    a.  Sounds like you are (give an emotion or feeling that seems to be true)???    b. Repeat the person s words to you  If s/he said,  Where s my mom?  you   would say  You re looking for your mom (pause)  tell me about your mom     If the person said  I want to go home! , you would say  You want to go home (pause)  Tell me about your home  .     BASIC CARD CUES - WITH Dementia     -Knock - Announce self     -Greet & Smile     -Move Slowly - Hand offered in  handshake  position     -Move from the front to the side     -Greet with a handshake & your name        -Slide into hand-under-hand hold         -Get to the person s level     -Be friendly -make a  nice  comment or smile         -Give your message  simple, short, friendly     First -     ALWAYS use the positive physical approach!     Then -     Pay attention to the THREE ways you communicate     1 .  How you speak   - Tone of voice (friendly not bossy or critical)   - Pitch of voice (deep is better)   - Speed of speech ( slow and easy not pressured or fast)     2 .  What you say   THREE basic reasons to talk to someone   1. To get the person to DO something (5   approaches to try)   1. give a short, direct message about what is happening   2. give simple choices about what the person can do   3. ask the person to help you do something   4. ask if the person will give it a try   5.  break down the task - give it one step at a time     ** only ask  Are you ready to   If you are willing to come back later **     2.  Just to have a friendly interaction - to talk to the person   1.  go slow - Go with Flow   2.  acknowledge emotions - \"sounds like , seems like , I can see you are \"   ?  3. use familiar words or phrases (what the person uses)   4. know who the person has been as a person what " "s/he values   ?  5. use familiar objects, pictures, actions to help & direct   ?   6.be prepared to have the same conversation over & over   ?   7. look interested & friendly   8.be prepared for some emotional outbursts   9.DON'T argue  - BUT don't let the person get into dangerous situations     **REMEMBER - the person is doing the BEST that s/he can**    3. Deal with the person's distress or frustration/anger   1.Try to figure out what the person really NEEDS or WANTS (\"It sounds like \" \"It looks like \" \"It seems like \" \"You're feeling \")   2.Use empathy not forced reality or lying   3.Once the person is listening and responding to you THEN -   4.Redirect his attention and actions to something that is OK OR   5.Distract him with other things or activities you know he likes & values     **Always BE CAREFUL about personal space and touch with the person especially when s/he is distressed or being forceful **    4.  How you respond to the person   1.use positive, friendly approval or praise (short, specific and sincere)   2.offer your thanks and appreciation for his/her efforts   3.laugh with him/her & appreciate attempts at humor & friendliness   4.  shake hands to start and end an interaction    5.  use touch - hugging, hand holding, comforting only IF the person wants it     If what you are doing is NOT working -       STOP!       BACK OFF - give the person some space and time       Decide on what to do differently       Try Again!       Key Points About 'Who' the person Is .   - preferred name   - introvert or extrovert   - a planner or a doer   - a follower or a leader   - a 'detail' or a 'big picture' person   - work history - favorite and most hated jobs or parts of jobs   - family relationships and history - feelings about   -various family members   - social history - memberships and relationships to friends and groups   - leisure background - favorite activities & beliefs about fun, games, & free time   - " previous daily routines and schedules   - personal care habits and preferences   - [employer] and spiritual needs and beliefs   - values and interests   - favorite topics, foods, places   - favorite music and songs - dislike of music or songs   - hot buttons & stressors   - behavior under stress   - what things help with stress?   - handedness   - level of cognitive impairment   - types of help that are useful       Communication - When Words Don t Work Anymore      Keys to Success:   -Watch movements & actions   -Watch facial expressions and eye movements   -Listen for changes in volume, frequency, and intensity of sounds or words   -Investigate & Check it out   -Meet the need     It s all about Meeting Needs    -Physical needs   -Emotional needs   -Probable Needs:   -Physical     *Tired   *In pain or uncomfortable   *Thirsty or Hungry   *Need to pee or have a BM or already did & need help   *Too hot or too cold     -Emotional   *Afraid   *Lonely   *Bored   *Angry   *Excited     What Can You Do?   -Figure it out Go thru the list   -Meet the need  Offer help that matches need   -Use visual cues more than verbal cues   -Use touch only after  permission  is given   -Connect - Visually, Verbally, Tactilely   -Protect Yourself & the Person - use Hand Under Hand & Supportive Stance techniques   -Reflect - copy expression/tone, repeat some key words, move with the person   -Engage - LISTEN with your head, your heart, and your body  -Respond - try to meet the unmet needs, offer comfort and connection     ** IF IT DOESN T seem to be working - STOP, BACK OFF - and then TRY AGAIN - changing something in your efforts (visually, verbally, or through touch/physical contact)**     Types of Help - Using Your Senses   1. Visual   -Written Information - Schedules and Notes   -Key Word Signs - locators & identifiers   -Objects in View - familiar items to stimulate task performance   -Gestures - pointing and movements   -Demonstration  "- provide someone to imitate     2.  Auditory  -Talking and Telling - give information, ask questions, provide choices   -Breaking it Down - Step-by-Step Task Instructions   -Using Simple Words and Phrases - Verbal Cues   -Name Calling - Auditory Attention   -Positive Feedback - praise, \"yes\", encouragement     3.Tactile - Touch   -Greeting & Comforting - handsmarilia darby, 'hand-holding'   -Touch for Attention during tasks  - Tactile Guidance - lead through 'once' to get the feel   -Hand-Under-Hand Guidance - palm to palm contact   -Hand-Under-Hand Assistance - physical help   -Dependent Care - doing for & to the person     CLARA HILLS MSW, [facility]  Triage and Transition - Consult and Liaison   [phone]    "

## 2023-02-28 NOTE — CONSULTS
"      Psychiatry Consultation; Follow up              Reason for Consult, requesting source:    Follow up  Requesting source: Ashkan Hernandez    Labs and imaging reviewed, discussed with nursing.  Last seen by psych consult service on 2/21/23               Interim history:    This is a 57-year-old male whom we are asked to see to assist with delirium management.  Patient has been residing in an LTACH since he suffered {EA  arrest in November and was diagnosed with parainfluenza and strep pneumoniae as well as acute on chronic renal insufficiency.  He ended up having end-stage renal disease, and was also found to have cirrhosis of his transplanted liver which had been performed in 2016.  The patient presented for this hospitalization on 1/21/2023 as a transfer from [place] for evaluation of loculated pleural effusion.  At the LTAC, the patient had had worsening effusions, and had thoracentesis on 1/13/2023 which returned exudative without growth on cultures.  CT chest abdomen pelvis on 1/18/2023 showed worsening effusions and concerns for infected parapneumonic effusions with possible SBP.  The patient was transferred to [facility] for consideration of chest tube placement and possible intrapleural lysis.      I met with Blanco in his room, he is seen laying in bed, dad and brother at bedside- he is agreeable to both staying for duration of assessment. Calm and cooperative. States he is feeling much better, describes feeling \"positive and more optimistic\"; reports that previously his mood and outlook were much poorer but as his physical condition improves, his mood improves as well. Denies feeling depressed, denies SI/HI. States anxiety is \"manageable\". Does admit that recently he has been more irritable, feels that little things set him off more than they usually would but thinks this is improving. Denies any AVH but does talk about dreams at night which are so vivid he is sometimes unsure if they are real " "or dreams; describes that he does reality testing such as looking for inconsistencies or checking for objects/landmarks he knows should be there. He does not think he ever acts out these dreams and states it only happens when sleeping, never when he is awake. Patient and family both report dreams have been ongoing for months, well before ramelteon was started. Thinks ramelteon is helpful for sleep and would like to continue. Oriented to person, place, and time but only partially oriented to situation- not able to clearly describe why he is in the hospital or what has been happening, just states he has \"an illness\".     Family agrees that he has improved significantly and is closer to baseline, though not quite there. No concerns for aggression or agitation, but family does report that they think he tends to get more anxious/worked up after family leaves for the day.         Current Medications:       - MEDICATION INSTRUCTIONS for Dialysis Patients -   Does not apply See Admin Instructions     apixaban ANTICOAGULANT  5 mg Oral BID     calcium acetate (phos binder)  667 mg Oral TID w/meals     folic acid  1 mg Oral or Feeding Tube Daily     guaiFENesin  600 mg Oral BID     lacosamide  100 mg Oral or Feeding Tube BID     lactulose  10 g Oral BID     levETIRAcetam  1,000 mg Oral or Feeding Tube Q24H     lidocaine  1 patch Transdermal Q24H     lidocaine   Transdermal Q8H BIJU     midodrine  10 mg Oral or Feeding Tube TID w/meals     mineral oil-hydrophilic petrolatum   Topical Daily     multivitamin RENAL  1 capsule Oral Daily     nystatin  500,000 Units Swish & Spit 4x Daily     OLANZapine zydis  5 mg Oral At Bedtime     pantoprazole  40 mg Oral or Feeding Tube BID AC     ramelteon  8 mg Oral QPM     rifaximin  550 mg Oral or Feeding Tube BID     sodium chloride (PF)  10-30 mL Intracatheter Q8H     sodium chloride (PF)  3 mL Intracatheter Q8H     tacrolimus  1 mg Oral BID IS     cholecalciferol  50 mcg Oral Daily " "             MSE:   Appearance: awake, alert and slightly unkempt  Attitude:  cooperative  Eye Contact:  good  Mood:  \"positive and more optimistic\"  Affect:  appropriate and in normal range and mood congruent  Speech:  clear, coherent, circumstantial at times  Psychomotor Behavior:  no evidence of tardive dyskinesia, dystonia, or tics  Thought Process:  logical, linear and goal oriented  Associations:  no loose associations  Thought Content:  no evidence of suicidal ideation or homicidal ideation and no evidence of psychotic thought  Insight:  fair  Judgement:  fair  Oriented to:  time, person, and place  Attention Span and Concentration:  fair  Recent and Remote Memory:  fair        Vital signs:  Temp: 97.9  F (36.6  C) Temp src: Oral BP: 122/82 Pulse: 88   Resp: 18 SpO2: 92 % O2 Device: None (Room air) Oxygen Delivery: 2 LPM   Weight: 84.2 kg (185 lb 10 oz)  Estimated body mass index is 25.18 kg/m  as calculated from the following:    Height as of 11/12/22: 1.829 m (6').    Weight as of this encounter: 84.2 kg (185 lb 10 oz).              DSM-5 Diagnosis:   Delirium, improving          Assessment:   Blanco Osborne is a 57 year old male seen today for follow up. He has had intermittent episodes of agitation with fluctuating mental status. He has a 1:1 sitter due to impulsivity and agitation. He is currently on olanzapine 5mg nightly, no reported or observed side effects- will increase to 5mg BID. He also has PRN olanzapine available which is only being used minimally- twice in the past 7 days per MAR. Ramelteon was recently started for sleep, pt and family both think this has been helpful.     He reports his mood is good, denies feeling depressed and that anxiety is \"manageable\". He was previously on fluoxetine which has since been discontinued; would not recommend resuming this due to 3A4 inhibition and concurrent tacrolimus use, which could lead to increased tacro serum level. Tacrolimus does have potential to " "cause neuropsychiatric effects regardless of blood serum level, though noted that most recent level drawn on 2/2/23 was subtherapeutic at 4.5.          Summary of Recommendations:   1.  Increased olanzapine to 5mg BID to better address ongoing, intermittent agitation    2.  Continue olanzapine 5mg BID PRN for agitation     3.  Continue ramelteon 8mg nightly for sleep    4.  Please reconsult psychiatry as needed    STEFFANY Del Castillo [facility]  Consult/Liaison Psychiatry   [facility]    Contact information available via [facility] Paging/Directory  If I am not available, then [facility] line ([phone]) should know who is covering our consult service.   \"Much or all of the text in this note was generated through the use of Dragon Dictate voice to text software. Errors in spelling or words which appear to be out of contact are unintentional, may be present due having escaped editing\"           "

## 2023-02-28 NOTE — PLAN OF CARE
Goal Outcome Evaluation:      Plan of Care Reviewed With: patient    DATE & TIME: 2/27/23 8476-9910       Cognitive Concerns/ Orientation: Pt disoriented to situation; calm/cooperative with intermittent confusion/forgetfulness   BEHAVIOR & AGGRESSION TOOL COLOR: Green             ABNL VS/O2: VSS on RA  Labs:Hb 8.9. Mag 1.5, replaced, recheck at 2200.  MOBILITY: Assist-2, lift; Turn/Repo every 2 hours. Seizure precautions. Has been up to commode. Up to chair.    PAIN MANAGMENT: Tylenol given x1 for back pain, also has Lido patch on lower back.   DIET: Advanced to regular diet from soft bite sized (level 6), continuing mildly thick liquids (level 2); improved appetite, continue to refuse TID TF boluses, nutrition following.   BOWEL/BLADDER: Continent of bowel, using bed pan and commode, BM x3 this shift. Pt on hemodialysis  DRAIN/DEVICES: L PIV saline locked; R external jugular tunneled CVC for hemodialysis; Double lumen PEG tube, clamped and currently used for liquid meds, pt has been taking pills po now.  SKIN: Jaundiced, Scattered scabs and bruising. Blanchable redness to coccyx. Light redness left elbow, Mepilex in place.  D/C DATE: Discharge pending placement  OTHER IMPORTANT INFO: Lung sounds diminished with infrequent, weak, nonproductive cough. S/p Dialysis today.

## 2023-02-28 NOTE — PROGRESS NOTES
NEPHROLOGY    ESRD:    No acute issues in regards to his treatments.  Chemistries are well controlled.  Volume status appears to be adequate as well.    Plan on dialysis tomorrow.    INSOMNIA:      Blanco relates this is one of his major problems.  I am unsure whether he has had a sleep evaluation in the past but be reasonable for him to see a sleep consultant as an outpatient.    No acute events overnight.      Rainer Fitzpatrick MD  Nephrology  [facility]  Cell:[phone]  [employer]   Pager:[phone]  <><><><><><><><>    7 feels he is slowly making progress.  He is frustrated by physical weakness and chronic fatigue issues which is understandable.    He also sleeps very very poorly and has for some time.    Breathing is okay is not having swelling.    Blood pressure remains very good in the 1 teens by and large.  Pulse is controlled.  Weight yesterday was 84.2 kg.    Blanco is alert fluent and in no acute distress.  Jugular venous pressure is not elevated  Chest is clear and breathing is unlabored.  Heart tones are soft and there is a soft systolic murmur present    He has no edema in the face trunk or extremities.

## 2023-02-28 NOTE — PLAN OF CARE
Goal Outcome Evaluation:         DATE & TIME: 2/27/23 [id number]  Cognitive Concerns/ Orientation: Pt confused, oriented to self/place, forgetful   BEHAVIOR & AGGRESSION TOOL COLOR: Green, calm/cooperative            ABNL VS/O2: VSS on RA  Labs:Hb 8.9. Mag 2.6 after replacement  MOBILITY: Assist-2, lift; able to turn/repo himself in bed, Seizure precautions  PAIN MANAGMENT:Denies pain  DIET: Advanced to regular diet from soft bite sized (level 6), continuing mildly thick liquids (level 2); improved appetite, continue to refuse TID TF boluses.   BOWEL/BLADDER: Continent of bowel, using bed pan and commode, BM x1 this shift. Pt on hemodialysis  DRAIN/DEVICES: L PIV saline locked; R external jugular tunneled CVC for hemodialysis; Double lumen PEG tube, clamped and currently used for liquid meds, pt has been taking pills po now.  SKIN: Jaundiced, Scattered scabs and bruising. Blanchable redness to coccyx. Light redness left elbow, Mepilex in place.  D/C DATE: Discharge pending placement  OTHER IMPORTANT INFO: LS-diminished, occ nonproductive cough, no SOB, S/p Dialysis yesterday.

## 2023-03-01 LAB — MAGNESIUM SERPL-MCNC: 2.1 MG/DL (ref 1.7–2.3)

## 2023-03-01 PROCEDURE — 86704 HEP B CORE ANTIBODY TOTAL: CPT | Performed by: INTERNAL MEDICINE

## 2023-03-01 PROCEDURE — 634N000001 HC RX 634: Performed by: INTERNAL MEDICINE

## 2023-03-01 PROCEDURE — 90937 HEMODIALYSIS REPEATED EVAL: CPT

## 2023-03-01 PROCEDURE — 120N000001 HC R&B MED SURG/OB

## 2023-03-01 PROCEDURE — 250N000013 HC RX MED GY IP 250 OP 250 PS 637: Performed by: STUDENT IN AN ORGANIZED HEALTH CARE EDUCATION/TRAINING PROGRAM

## 2023-03-01 PROCEDURE — 250N000013 HC RX MED GY IP 250 OP 250 PS 637: Performed by: INTERNAL MEDICINE

## 2023-03-01 PROCEDURE — 250N000012 HC RX MED GY IP 250 OP 636 PS 637: Performed by: STUDENT IN AN ORGANIZED HEALTH CARE EDUCATION/TRAINING PROGRAM

## 2023-03-01 PROCEDURE — 87340 HEPATITIS B SURFACE AG IA: CPT | Performed by: INTERNAL MEDICINE

## 2023-03-01 PROCEDURE — 36415 COLL VENOUS BLD VENIPUNCTURE: CPT | Performed by: INTERNAL MEDICINE

## 2023-03-01 PROCEDURE — 258N000003 HC RX IP 258 OP 636: Performed by: INTERNAL MEDICINE

## 2023-03-01 PROCEDURE — 250N000013 HC RX MED GY IP 250 OP 250 PS 637

## 2023-03-01 PROCEDURE — 83735 ASSAY OF MAGNESIUM: CPT | Performed by: INTERNAL MEDICINE

## 2023-03-01 PROCEDURE — 99232 SBSQ HOSP IP/OBS MODERATE 35: CPT | Performed by: INTERNAL MEDICINE

## 2023-03-01 PROCEDURE — P9045 ALBUMIN (HUMAN), 5%, 250 ML: HCPCS | Performed by: INTERNAL MEDICINE

## 2023-03-01 PROCEDURE — 250N000011 HC RX IP 250 OP 636: Performed by: INTERNAL MEDICINE

## 2023-03-01 PROCEDURE — 250N000013 HC RX MED GY IP 250 OP 250 PS 637: Performed by: HOSPITALIST

## 2023-03-01 PROCEDURE — 99233 SBSQ HOSP IP/OBS HIGH 50: CPT | Performed by: INTERNAL MEDICINE

## 2023-03-01 RX ORDER — PANTOPRAZOLE SODIUM 40 MG/1
40 TABLET, DELAYED RELEASE ORAL
Status: DISCONTINUED | OUTPATIENT
Start: 2023-03-02 | End: 2023-04-28 | Stop reason: HOSPADM

## 2023-03-01 RX ADMIN — RAMELTEON 8 MG: 8 TABLET, FILM COATED ORAL at 20:16

## 2023-03-01 RX ADMIN — Medication 1 CAPSULE: at 12:15

## 2023-03-01 RX ADMIN — MIDODRINE HYDROCHLORIDE 10 MG: 5 TABLET ORAL at 07:35

## 2023-03-01 RX ADMIN — MIDODRINE HYDROCHLORIDE 10 MG: 5 TABLET ORAL at 12:15

## 2023-03-01 RX ADMIN — OLANZAPINE 5 MG: 5 TABLET, ORALLY DISINTEGRATING ORAL at 20:16

## 2023-03-01 RX ADMIN — APIXABAN 5 MG: 5 TABLET, FILM COATED ORAL at 20:16

## 2023-03-01 RX ADMIN — EPOETIN ALFA-EPBX 10000 UNITS: 10000 INJECTION, SOLUTION INTRAVENOUS; SUBCUTANEOUS at 09:54

## 2023-03-01 RX ADMIN — NYSTATIN 500000 UNITS: 100000 SUSPENSION ORAL at 12:19

## 2023-03-01 RX ADMIN — LACTULOSE 10 G: 10 SOLUTION ORAL at 12:15

## 2023-03-01 RX ADMIN — CALCIUM ACETATE 667 MG: 667 CAPSULE ORAL at 12:15

## 2023-03-01 RX ADMIN — TACROLIMUS 1 MG: 1 CAPSULE ORAL at 12:15

## 2023-03-01 RX ADMIN — WHITE PETROLATUM: 1.75 OINTMENT TOPICAL at 07:37

## 2023-03-01 RX ADMIN — LIDOCAINE 1 PATCH: 560 PATCH PERCUTANEOUS; TOPICAL; TRANSDERMAL at 07:34

## 2023-03-01 RX ADMIN — ACETAMINOPHEN 650 MG: 325 TABLET, FILM COATED ORAL at 07:40

## 2023-03-01 RX ADMIN — OLANZAPINE 5 MG: 5 TABLET, ORALLY DISINTEGRATING ORAL at 12:15

## 2023-03-01 RX ADMIN — HEPARIN SODIUM 2400 UNITS: 1000 INJECTION INTRAVENOUS; SUBCUTANEOUS at 09:56

## 2023-03-01 RX ADMIN — SODIUM CHLORIDE 300 ML: 9 INJECTION, SOLUTION INTRAVENOUS at 09:53

## 2023-03-01 RX ADMIN — SODIUM CHLORIDE 250 ML: 9 INJECTION, SOLUTION INTRAVENOUS at 09:54

## 2023-03-01 RX ADMIN — GUAIFENESIN 600 MG: 600 TABLET, EXTENDED RELEASE ORAL at 12:15

## 2023-03-01 RX ADMIN — Medication 50 MCG: at 12:15

## 2023-03-01 RX ADMIN — GUAIFENESIN 600 MG: 600 TABLET, EXTENDED RELEASE ORAL at 20:16

## 2023-03-01 RX ADMIN — CALCIUM ACETATE 667 MG: 667 CAPSULE ORAL at 09:28

## 2023-03-01 RX ADMIN — TACROLIMUS 1 MG: 1 CAPSULE ORAL at 20:16

## 2023-03-01 RX ADMIN — MIDODRINE HYDROCHLORIDE 10 MG: 5 TABLET ORAL at 18:08

## 2023-03-01 RX ADMIN — NYSTATIN 500000 UNITS: 100000 SUSPENSION ORAL at 07:34

## 2023-03-01 RX ADMIN — APIXABAN 5 MG: 5 TABLET, FILM COATED ORAL at 12:15

## 2023-03-01 RX ADMIN — FOLIC ACID 1 MG: 1 TABLET ORAL at 12:15

## 2023-03-01 RX ADMIN — RIFAXIMIN 550 MG: 550 TABLET ORAL at 12:15

## 2023-03-01 RX ADMIN — RIFAXIMIN 550 MG: 550 TABLET ORAL at 20:16

## 2023-03-01 RX ADMIN — CALCIUM ACETATE 667 MG: 667 CAPSULE ORAL at 18:08

## 2023-03-01 RX ADMIN — ALBUMIN HUMAN 250 ML: 0.05 INJECTION, SOLUTION INTRAVENOUS at 09:55

## 2023-03-01 RX ADMIN — PANTOPRAZOLE SODIUM 40 MG: 40 TABLET, DELAYED RELEASE ORAL at 07:35

## 2023-03-01 RX ADMIN — LACOSAMIDE 100 MG: 100 TABLET, FILM COATED ORAL at 12:15

## 2023-03-01 ASSESSMENT — ACTIVITIES OF DAILY LIVING (ADL)
ADLS_ACUITY_SCORE: 60
ADLS_ACUITY_SCORE: 64
ADLS_ACUITY_SCORE: 64
ADLS_ACUITY_SCORE: 60
ADLS_ACUITY_SCORE: 56
ADLS_ACUITY_SCORE: 64
ADLS_ACUITY_SCORE: 64
ADLS_ACUITY_SCORE: 62
ADLS_ACUITY_SCORE: 62
ADLS_ACUITY_SCORE: 60
ADLS_ACUITY_SCORE: 62
ADLS_ACUITY_SCORE: 60

## 2023-03-01 NOTE — PLAN OF CARE
Goal Outcome Evaluation:       Cognitive Concerns/ Orientation: Pt confused, oriented to self/place, forgetful   BEHAVIOR & AGGRESSION TOOL COLOR: Green, calm/cooperative  tonight         ABNL VS/O2: VSS on RA  Labs: no labs drawn today  MOBILITY: Assist of strong 2 w Justyna steady able to turn/repo himself in bed, seizure precautions  PAIN MANAGMENT:Denies pain  DIET: Regular diet w mildly thick liquids (level 2); improved appetite, continue to refuse TID TF boluses.   BOWEL/BLADDER: Continent of bowel, using commode, BM x4  this shift, held scheduled lactulose. Pt on hemodialysis  DRAIN/DEVICES: Pt pulled PIV, refusing new IV, not on any scheduled IV med, MD okay with no IV for now. Rt external jugular tunneled CVC for hemodialysis; Double lumen PEG tube, clamped.   SKIN: Jaundiced, Scattered scabs and bruising. Blanchable redness to coccyx. Light redness left elbow, Mepilex in place.  TEST/PROCEDURES: HD tomorrow 3/1  D/C DATE: Discharge pending placement  OTHER IMPORTANT INFO: LS-diminished, occ nonproductive cough, no SOB.

## 2023-03-01 NOTE — PROGRESS NOTES
[facility]    Medicine Progress Note - Hospitalist Service    Date of Admission:  1/21/2023    Assessment & Plan   Blanco Osborne is a 57 year-old male admitted on 1/21/2023 as a transfer from [facility] for evaluation of loculated pleural effusion. He has extensive PMH including h/o liver transplant 2016 due to alcohol abuse, pAF on chronic anticoagulation, history of diabetes mellitus type 2, CVA, hypertension, CHRISTIAN on BiPAP.  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 He was recovering in [facility] and was transferred to [facility] for consideration of chest tube placement and possible intrapleural lysis for worsening effusion.     Acute metabolic encephalopathy with delirium, multifactorial  Hepatic encephalopathy  Chronic liver disease, history of liver transplant 2016  End-stage renal disease (ESRD), on hemodialysis (HD)  History of seizure, on Keppra and Vimpat  Waxing and waning mentation, coinciding with lactulose being held at [facility]  Earlier in hospital stay, remained confused and had pulled out tracheostomy tube, PICC line and pulled his dialysis catheter.  Lines replaced.  Palliative care consult 1/26, see note.  * Psychiatry consulted 2/2, see note, follow-up requested.    On/off fluctuating mentation, recently improving     Continue to reorient as needed; maintain normal day/night, sleep wake cycles; minimize sedating/altering medications as able    Continue Lactulose to 10 mg BID, monitor for symptoms to have 2-3 Bms    Zyprexa 5 mg at bedtime scheduled.  Melatonin 3 mg p.o. at bedtime scheduled    Olanzapine BID as needed for severe agitation or anxiety; monitor for side effets    Mittens/sitter as needed; minimize use of restraints as able    Ongoing hemodialysis, as  per nephrology, on M-W-F dialysis schedule; ongoing nephrology consult follow-up appreciated    2/20/23 Off restraints.Mentation has improved    2/21/23 -Fall this morningWorsening.Delirium. Ramelteon started.Psyciatry consult appreciated  2/22/23 Improved mentation     2/28: Continues with one-to-one sitter.  Appreciate psychiatry evaluation: Decrease Zyprexa to 5 mg twice daily and continuing 5 mg twice daily as needed.  Also continue Ramelteon 8 mg nightly    Bilateral pleural effusions   Acute respiratory failure requiring tracheostomy  - resolved    Pneumonia  - resolved   ARDS  - resolved    Right pneumothorax - resolved   *Initial event was parainfluenza and strep pneumo pneumonia back in November 2022. Treated for ARDS. Had failed extubation x2 and tracheostomy performed on previous hospitalization. *Had additional complications of bilateral pneumothoraces as well as hydropneumothorax- pleural fluid grew candida parapsilosis  *Completed 4-week antifungal treatment. [facility].  *Recently there were concern for worsening effusion while at [facility] and had thoracentesis 01/13 which returned exudative without growth on cultures. CT chest/abdomen/pelvis on 01/18 demonstrated worsening effusions and concerns for infected parapneumonic effusions with possible SBP.  Multiple pulmonologist at [facility] reviewed CT scan recommended transfer to [facility] for consideration of chest tube placement and possible intrapleural lysis  *Thoracic surgery (Dr. Jackson) consulted during admission   *CT chest 1/22, small effusions on imaging and so chest tube was not inserted.   ID consulted, see note 2/10.  *Patient pulled out his tracheostomy tube on the night 2/1-2/2 and is tolerating well without increased shortness of breath persistent cough or worsening hypoxia  * Chest x-ray 2/3 with cardiomegaly, chronic small bilateral pleural effusions, no pulmonary edema; right internal  "jugular dialysis catheter in place    Monitor respiratory status, recently stable on room air    Encourage incentive spirometry as able    Wound care previously consulted for tracheostomy site care, monitor for signs and symptoms of infection    - Now Stable on RA   Discontinued nystatin 3/01    S/p liver transplant 2016   Chronic immunosuppression, on tacrolimus  Cirrhosis with ascites  Subacute bacterial peritonitis (SBP), resolved  *Main manifestations of this are hepatic encephalopathy and ascites.  Hepatologist at [facility] felt that most likely reason for the cirrhosis was metabolic syndrome or alcohol intake.  There was no evidence of rejection on biopsy and there was some evidence of regeneration. Paracentesis done on 12/22/2022 showed lactobacillus indicating SBP, treated with Unasyn and Augmentin, finished 2-week course 1/12/2023.  Requires large-volume paracentesis periodically for recurring ascites.    *Paracentesis 1/13/2023 yielded about 7500 cc. Cultures NGTD.  Most recent paracentesis on 1/24 with 4.8 L fluid removal    Continue intermittent paracentesis as needed if develops symptomatic ascites    Monitor for signs and symptoms of recurrent spontaneous bacterial peritonitis     Further treatment for recurrent ascites with TIPS deferred to his Liver Transplant team and/or Hepatology, he has an appointment on 4/21/2023 with Hepatology, Dr. Simms    Continue Tacrolimus 1 mg BID.    Continue rifaximin 550 mg BID    Continue lactulose as ordered, titrate as needed to have 2-3 bms    GI consult as needed in hospital for new acute issues, otherwise as outpatient     Goals of care   As per prior rounding provider, \"on 1/25 nursing had mentioned that patient had refused to undergo dialysis and want to stop care, palliative care was consulted.  Patient and his spouse denied wanting to stop care and wanted ongoing restorative care including full code\"    Monitor, Full Code status    -Needs placement to " TCU     Diarrhea, improved, on lactulose for chronic liver disease  - C difficile negative on 1/31. Secondary to lactulose.  - discontinued rectal tube (2/12) as stools more formed    Continue lactulose with goal of 2 to 3 soft bowel movement in 24 hours     Paroxysmal atrial fibrillation  Chronic anticoagulation, apixaban  Remains in sinus rhythm, on anticoagulation with apixaban indefinitely for stroke prophylaxis.      Monitor rhythm    Continue apixaban anticoagulation    Monitor hemoglobin, see below     Acute anemia  Pt has required intermittent transfusions for on-going anemia.  Anemia most likely secondary to critical illness/chronic disease, chronic renal disease.  Baseline hemoglobin around 7-8  Likely Epo resistance    Transfuse packed red blood cells to keep hemoglobin > 7    Epoetin lfa-epbx use as per nephrology     History PEA arrest   PEA arrest prior to his previous admission and 1 episode of PEA associated with desaturation on 12/20.  No structural cardiac disease.     Stable, continue cardiac Monitoring     Chronic Hypotension  In the past has developed baseline hypotension with cirrhosis and prolonged critical illness.  On hemodialysis.  Receiving midodrine and albumin during dailysis    Continue midodrine, as per nephrology      History of seizure   After hypoxic event, in the context of baseline multifactorial encephalopathy secondary to his ongoing critical illness and prior cardiac arrests and prior strokes and cirrhosis with hepatic encephalopathy. MRI of head of 12/20/22 reveals no PRES or leptomeningeal enhancement but scattered.  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   No recent seizure activity.    Continue levetiracetam, lacosamide     Seizure precautions    Outpatient follow-up with neurology, consult inpatient if needed     # ESRD  # Anemia due to renal disease  # Hypotension during dialysis  -Underwent dialysis  on 2/22/23 and  was hypotensive and received midodrine and albumin  Receiving albumin with dialysis       Diabetes mellitus type 2  *Hemoglobin A1c on November 2022 was 4.7  *By report, on Lantus insulin in the past.  Blood sugar checks and sliding scale insulin previously discontinued as has been stable without insulin needs    Monitor with periodic blood sugar checks as needed     Severe malnutrition, protein and calorie type  Moderate dysphagia  G-tube feedings    Continue with tube feeds via PEG tube    IR replaced the PEG tube on 2/8/2023, monitor    Nutritionist consulted and following for tube feeds    SLP following as well. Oral diet as per speech and language therapy (SLP)     2/20 Nutrition following for tube feeds    Underwent video swallow on 2/23/22     3/1: Change PPI to daily as per family's request    Anxiety  Fluoxetine was discontinued as per psychiatry recommendation on 2/2 (see consult note for details)     Stable, monitor; comfort and reassurance offered during visit    Psychiatry follow-up       Diet: Room Service  Snacks/Supplements Adult: Other; Gelatein+ with lunch and magic cup with dinner (RD); With Meals  Adult Formula Bolus Feeding: Novasource Renal; Route: Gastrostomy; 3 times daily; Volume per Bolus: 240; mL(s); Continue to encourage once DAILY dinner time bolus at 80 mL/hr x 3 hrs. If patient consumes <75% of breakfast and lunch, encourage back...  Combination Diet Regular Diet; Mildly Thick (level 2)    DVT Prophylaxis: DOAC  Nixon Catheter: Not present  Lines: PRESENT      CVC Double Lumen Right External jugular Tunneled-Site Assessment: WDL      Cardiac Monitoring: None  Code Status: Full Code      Clinically Significant Risk Factors              # Hypoalbuminemia: Lowest albumin = 1.9 g/dL at 1/23/2023  6:38 AM, will monitor as appropriate            # Severe Malnutrition: based on nutrition assessment        Disposition Plan     Expected Discharge Date: 03/02/2023, 12:00 PM    Destination:  inpatient rehabilitation facility  Discharge Comments: admitted on 1-21 from [facility], Dialysis pt MWF. Will need placement        Pako Tan MD  Hospitalist Service  [facility]  Securely message with Button (more info)  Text page via [employer] Paging/Directory   ______________________________________________________________________    Interval History   No specific new concerns.  Discussed with RN and patient's wife    Physical Exam   /71 (BP Location: Left arm)   Pulse 75   Temp 98.1  F (36.7  C) (Oral)   Resp 18   Wt 84.2 kg (185 lb 10 oz)   SpO2 92%   BMI 25.18 kg/m    Gen- pleasant   Neck- supple  CVS- I+II+ ESM  RS- CTAB, decreased at base   Abdo- soft, no tenderness. No g/r/r   Ext- no edema     Medical Decision Making       50 MINUTES SPENT BY ME on the date of service doing chart review, history, exam, documentation & further activities per the note.      Data   ------------------------- PAST 24 HR DATA REVIEWED -----------------------------------------------        Imaging results reviewed over the past 24 hrs:   No results found for this or any previous visit (from the past 24 hour(s)).

## 2023-03-01 NOTE — PROGRESS NOTES
[facility]    Nephrology Progress Note    Assessment & Plan     <ESRD, ON HD    ~Tolerating treatments well.     - MWF HD            Interval History     Seen on dialysis. No acute symptoms.     No cramping on dialysis, tolerating current UF.       Temp: 97.2  F (36.2  C) Temp src: Oral BP: 110/77 Pulse: 84   Resp: 29 SpO2: 97 % O2 Device: None (Room air)      Vitals:    02/17/23 0500 02/22/23 0500 02/27/23 1219   Weight: 87.6 kg (193 lb 2 oz) 82.3 kg (181 lb 7 oz) 84.2 kg (185 lb 10 oz)       Vital Signs with Ranges    Temp:  [97.2  F (36.2  C)-98.1  F (36.7  C)] 97.2  F (36.2  C)  Pulse:  [84-95] 84  Resp:  [17-32] 29  BP: ()/(59-83) 110/77  SpO2:  [92 %-97 %] 97 %    I/O last 3 completed shifts:  In: 1050 [P.O.:990; NG/GT:60]  Out: -     Physical Exam  Vitals and nursing note reviewed.   HENT:      Mouth/Throat:      Mouth: Mucous membranes are moist.   Eyes:      Pupils: Pupils are equal, round, and reactive to light.   Cardiovascular:      Rate and Rhythm: Normal rate and regular rhythm.   Pulmonary:      Effort: Pulmonary effort is normal. No respiratory distress.      Breath sounds: No rales.   Musculoskeletal:      Right lower leg: No edema.      Left lower leg: No edema.   Neurological:      Mental Status: He is alert.         BP Readings from Last 5 Encounters:   03/01/23 110/77   01/21/23 120/71   12/29/22 (!) 139/94   12/16/22 101/63   12/16/22 117/75        Wt Readings from Last 10 Encounters:   02/27/23 84.2 kg (185 lb 10 oz)   01/21/23 82.4 kg (181 lb 11.2 oz)   12/28/22 96 kg (211 lb 10.3 oz)   12/16/22 100.2 kg (221 lb)   12/16/22 100.2 kg (221 lb)   12/15/22 87 kg (191 lb 12.8 oz)   10/07/19 137.5 kg (303 lb 3.2 oz)   10/04/19 131.5 kg (290 lb)   02/20/19 140.4 kg (309 lb 9.6 oz)   07/10/18 137.5 kg (303 lb 3.2 oz)           Medications           - MEDICATION INSTRUCTIONS for Dialysis Patients -   Does not apply See Admin Instructions     apixaban ANTICOAGULANT  5  mg Oral BID     calcium acetate (phos binder)  667 mg Oral TID w/meals     folic acid  1 mg Oral or Feeding Tube Daily     guaiFENesin  600 mg Oral BID     lacosamide  100 mg Oral Q12H     lactulose  10 g Oral BID     levETIRAcetam  1,000 mg Oral Q24H     lidocaine  1 patch Transdermal Q24H     lidocaine   Transdermal Q8H BIJU     midodrine  10 mg Oral or Feeding Tube TID w/meals     mineral oil-hydrophilic petrolatum   Topical Daily     multivitamin RENAL  1 capsule Oral Daily     - MEDICATION INSTRUCTIONS -   Does not apply Once     nystatin  500,000 Units Swish & Spit 4x Daily     OLANZapine zydis  5 mg Oral BID     pantoprazole  40 mg Oral BID AC     ramelteon  8 mg Oral QPM     rifaximin  550 mg Oral or Feeding Tube BID     sodium chloride (PF)  10-30 mL Intracatheter Q8H     sodium chloride (PF)  3 mL Intracatheter Q8H     sodium chloride (PF) 0.9%  10 mL Intracatheter Once in dialysis/CRRT     sodium chloride (PF) 0.9%  10 mL Intracatheter Once in dialysis/CRRT     tacrolimus  1 mg Oral Q12H     cholecalciferol  50 mcg Oral Daily       Data     UA RESULTS:  Recent Labs   Lab Test 11/25/22  1859 10/31/17  1038   COLOR Yellow [place]   APPEARANCE Slightly Cloudy* Slightly Cloudy   URINEGLC Negative Negative   URINEBILI Negative Negative   URINEKETONE Negative 5*   SG 1.017 1.021   UBLD Moderate* Negative   URINEPH 5.5 5.0   PROTEIN 70* Negative   NITRITE Negative Negative   LEUKEST Small* Negative   RBCU  --  <1   WBCU  --  1      BMP  Recent Labs   Lab 02/27/23  1149 02/25/23  1448 02/24/23  0921 02/22/23  1312    134*  --  135*   POTASSIUM 4.2 4.5  --  3.5   CHLORIDE 99 94*  --  98   KELLY 8.6 9.5  --  8.6   CO2 29 31*  --  28   BUN 21.6* 27.6*  --  23.4*   CR 2.49* 3.38*  --  2.40*   GLC 82 111* 91 90     Phos@LABRCNTIPR(phos:4)  CBC)  Recent Labs   Lab 02/27/23  1149 02/25/23  1448 02/22/23  1025   WBC 3.4*  --   --    HGB 9.3* 8.9* 8.2*   HCT 32.7*  --   --    *  --   --    *  --   --       Lab Results   Component Value Date    ALBUMIN 2.8 02/25/2023    ALBUMIN 2.4 02/22/2023    ALBUMIN 2.3 02/17/2023    ALBUMIN 1.8 12/29/2022    ALBUMIN 1.7 12/28/2022    ALBUMIN 2.1 12/27/2022    ALBUMIN 3.9 04/20/2020    ALBUMIN 3.8 10/07/2019    ALBUMIN 3.8 02/20/2019        Recent Labs   Lab 02/27/23  1149   HGB 9.3*   HCT 32.7*   *       No lab results found in last 7 days.    Invalid input(s): BILIRUBININDIRECT  No lab results found in last 7 days.  25 OH Vit D2   Date Value Ref Range Status   09/24/2016 <5 ug/L Final     25 OH Vitamin D2   Date Value Ref Range Status   02/16/2023 <5 ug/L Final     25 OH Vit D3   Date Value Ref Range Status   09/24/2016 8 ug/L Final     25 OH Vitamin D3   Date Value Ref Range Status   02/16/2023 37 ug/L Final     25 OH Vit D total   Date Value Ref Range Status   09/24/2016 (L) 20 - 75 ug/L Final    <13  Season, race, dietary intake, and treatment affect the concentration of   25-hydroxy-Vitamin D. Values may decrease during winter months and increase   during summer months. Values 20-29 ug/L may indicate Vitamin D insufficiency   and values <20 ug/L may indicate Vitamin D deficiency.   This test was developed and its performance characteristics determined by the   [facility],  Special Chemistry Laboratory. It has   not been cleared or approved by the FDA. The laboratory is regulated under CLIA   as qualified to perform high-complexity testing. This test is used for clinical   purposes. It should not be regarded as investigational or for research.       25 OH Vit D Total   Date Value Ref Range Status   02/16/2023 <42 20 - 75 ug/L Final     Comment:     Season, race, dietary intake, and treatment affect the concentration of 25-hydroxy-Vitamin D. Values may decrease during winter months and increase during summer months. Values 20-29 ug/L may indicate Vitamin D insufficiency and values <20 ug/L may indicate Vitamin D deficiency.     No results  for input(s): PTHI in the last 168 hours.    Attestation:   I have reviewed today's relevant vital signs, notes, medications, labs and imaging.

## 2023-03-01 NOTE — PLAN OF CARE
Goal Outcome Evaluation:      Plan of Care Reviewed With: patient    Overall Patient Progress: no change    DATE & TIME: 2/28/23 2858-8861  Cognitive Concerns/ Orientation: A&O x2-3. Forgetful. Slept most of the night   BEHAVIOR & AGGRESSION TOOL COLOR: Green   ABNL VS/O2: VSS on RA  MOBILITY: A x2 mark steady. Repositions self frequently   PAIN MANAGMENT:Denies pain  DIET: Regular diet w mildly thick liquids (level 2); improved appetite, continue to refuse TID TF boluses.   BOWEL/BLADDER: Continent of bowel, using commode. On HD  DRAIN/DEVICES: Refusing new MD DOUGLAS aware. Rt external jugular tunneled CVC for hemodialysis. Double lumen PEG tube, clamped.   SKIN: Jaundiced, Scattered scabs and bruising. Blanchable redness to coccyx. Light redness left elbow, Mepilex in place.  TEST/PROCEDURES: HD Wednesday 3/1  D/C DATE: Discharge pending placement  OTHER IMPORTANT INFO:

## 2023-03-01 NOTE — PLAN OF CARE
Goal Outcome Evaluation:      Plan of Care Reviewed With: patient    DATE & TIME: 2/28/23 6817-3203  Cognitive Concerns/ Orientation: Pt confused, oriented to self/place, forgetful   BEHAVIOR & AGGRESSION TOOL COLOR: Green, calm/cooperative            ABNL VS/O2: VSS on RA  Labs: no labs drawn today  MOBILITY: Assist of strong 2 w mark steady able to turn/repo himself in bed, Seizure precautions  PAIN MANAGMENT:Denies pain  DIET: regular diet w mildly thick liquids (level 2); improved appetite, continue to refuse TID TF boluses.   BOWEL/BLADDER: Continent of bowel, using commode, BM x4  this shift, held scheduled lactulose. Pt on hemodialysis  DRAIN/DEVICES: Pt pulled PIV, refusing new IV, not on any scheduled IV med, MD okay with no IV for now. Rt external jugular tunneled CVC for hemodialysis; Double lumen PEG tube, clamped.   SKIN: Jaundiced, Scattered scabs and bruising. Blanchable redness to coccyx. Light redness left elbow, Mepilex in place.  TEST/PROCEDURES: HD tomorrow  D/C DATE: Discharge pending placement  OTHER IMPORTANT INFO: LS-diminished, occ nonproductive cough, no SOB.

## 2023-03-02 ENCOUNTER — APPOINTMENT (OUTPATIENT)
Dept: SPEECH THERAPY | Facility: CLINIC | Age: 58
DRG: 981 | End: 2023-03-02
Attending: STUDENT IN AN ORGANIZED HEALTH CARE EDUCATION/TRAINING PROGRAM
Payer: COMMERCIAL

## 2023-03-02 ENCOUNTER — APPOINTMENT (OUTPATIENT)
Dept: PHYSICAL THERAPY | Facility: CLINIC | Age: 58
DRG: 981 | End: 2023-03-02
Attending: STUDENT IN AN ORGANIZED HEALTH CARE EDUCATION/TRAINING PROGRAM
Payer: COMMERCIAL

## 2023-03-02 LAB
ALBUMIN SERPL BCG-MCNC: 2.7 G/DL (ref 3.5–5.2)
ALP SERPL-CCNC: 182 U/L (ref 40–129)
ALT SERPL W P-5'-P-CCNC: 6 U/L (ref 10–50)
ANION GAP SERPL CALCULATED.3IONS-SCNC: 10 MMOL/L (ref 7–15)
AST SERPL W P-5'-P-CCNC: 17 U/L (ref 10–50)
BILIRUB SERPL-MCNC: 0.5 MG/DL
BUN SERPL-MCNC: 36.2 MG/DL (ref 6–20)
CALCIUM SERPL-MCNC: 9.7 MG/DL (ref 8.6–10)
CHLORIDE SERPL-SCNC: 97 MMOL/L (ref 98–107)
CREAT SERPL-MCNC: 3.46 MG/DL (ref 0.67–1.17)
DEPRECATED HCO3 PLAS-SCNC: 31 MMOL/L (ref 22–29)
ERYTHROCYTE [DISTWIDTH] IN BLOOD BY AUTOMATED COUNT: 16.4 % (ref 10–15)
GFR SERPL CREATININE-BSD FRML MDRD: 20 ML/MIN/1.73M2
GLUCOSE SERPL-MCNC: 110 MG/DL (ref 70–99)
HCT VFR BLD AUTO: 32.2 % (ref 40–53)
HGB BLD-MCNC: 9.2 G/DL (ref 13.3–17.7)
MCH RBC QN AUTO: 30 PG (ref 26.5–33)
MCHC RBC AUTO-ENTMCNC: 28.6 G/DL (ref 31.5–36.5)
MCV RBC AUTO: 105 FL (ref 78–100)
PLATELET # BLD AUTO: 126 10E3/UL (ref 150–450)
POTASSIUM SERPL-SCNC: 5 MMOL/L (ref 3.4–5.3)
PROT SERPL-MCNC: 6.6 G/DL (ref 6.4–8.3)
RBC # BLD AUTO: 3.07 10E6/UL (ref 4.4–5.9)
SODIUM SERPL-SCNC: 138 MMOL/L (ref 136–145)
WBC # BLD AUTO: 3.8 10E3/UL (ref 4–11)

## 2023-03-02 PROCEDURE — 92526 ORAL FUNCTION THERAPY: CPT | Mod: GN | Performed by: SPEECH-LANGUAGE PATHOLOGIST

## 2023-03-02 PROCEDURE — 99232 SBSQ HOSP IP/OBS MODERATE 35: CPT | Performed by: INTERNAL MEDICINE

## 2023-03-02 PROCEDURE — 250N000013 HC RX MED GY IP 250 OP 250 PS 637: Performed by: INTERNAL MEDICINE

## 2023-03-02 PROCEDURE — 250N000013 HC RX MED GY IP 250 OP 250 PS 637

## 2023-03-02 PROCEDURE — 250N000012 HC RX MED GY IP 250 OP 636 PS 637: Performed by: STUDENT IN AN ORGANIZED HEALTH CARE EDUCATION/TRAINING PROGRAM

## 2023-03-02 PROCEDURE — 250N000013 HC RX MED GY IP 250 OP 250 PS 637: Performed by: STUDENT IN AN ORGANIZED HEALTH CARE EDUCATION/TRAINING PROGRAM

## 2023-03-02 PROCEDURE — 80053 COMPREHEN METABOLIC PANEL: CPT | Performed by: INTERNAL MEDICINE

## 2023-03-02 PROCEDURE — 120N000001 HC R&B MED SURG/OB

## 2023-03-02 PROCEDURE — 97530 THERAPEUTIC ACTIVITIES: CPT | Mod: GP

## 2023-03-02 PROCEDURE — 36415 COLL VENOUS BLD VENIPUNCTURE: CPT | Performed by: INTERNAL MEDICINE

## 2023-03-02 PROCEDURE — 85027 COMPLETE CBC AUTOMATED: CPT | Performed by: INTERNAL MEDICINE

## 2023-03-02 RX ADMIN — ACETAMINOPHEN 650 MG: 325 TABLET, FILM COATED ORAL at 13:32

## 2023-03-02 RX ADMIN — Medication 1 CAPSULE: at 08:46

## 2023-03-02 RX ADMIN — FOLIC ACID 1 MG: 1 TABLET ORAL at 08:46

## 2023-03-02 RX ADMIN — MIDODRINE HYDROCHLORIDE 10 MG: 5 TABLET ORAL at 13:24

## 2023-03-02 RX ADMIN — MIDODRINE HYDROCHLORIDE 10 MG: 5 TABLET ORAL at 17:03

## 2023-03-02 RX ADMIN — CALCIUM ACETATE 667 MG: 667 CAPSULE ORAL at 08:45

## 2023-03-02 RX ADMIN — LACOSAMIDE 100 MG: 100 TABLET, FILM COATED ORAL at 13:25

## 2023-03-02 RX ADMIN — TACROLIMUS 1 MG: 1 CAPSULE ORAL at 20:55

## 2023-03-02 RX ADMIN — CALCIUM ACETATE 667 MG: 667 CAPSULE ORAL at 13:25

## 2023-03-02 RX ADMIN — RIFAXIMIN 550 MG: 550 TABLET ORAL at 20:56

## 2023-03-02 RX ADMIN — MIDODRINE HYDROCHLORIDE 10 MG: 5 TABLET ORAL at 08:45

## 2023-03-02 RX ADMIN — GUAIFENESIN 600 MG: 600 TABLET, EXTENDED RELEASE ORAL at 20:55

## 2023-03-02 RX ADMIN — Medication 50 MCG: at 08:46

## 2023-03-02 RX ADMIN — PANTOPRAZOLE SODIUM 40 MG: 40 TABLET, DELAYED RELEASE ORAL at 08:45

## 2023-03-02 RX ADMIN — APIXABAN 5 MG: 5 TABLET, FILM COATED ORAL at 20:55

## 2023-03-02 RX ADMIN — LEVETIRACETAM 1000 MG: 500 TABLET, FILM COATED ORAL at 00:03

## 2023-03-02 RX ADMIN — APIXABAN 5 MG: 5 TABLET, FILM COATED ORAL at 08:46

## 2023-03-02 RX ADMIN — OLANZAPINE 5 MG: 5 TABLET, ORALLY DISINTEGRATING ORAL at 20:56

## 2023-03-02 RX ADMIN — WHITE PETROLATUM: 1.75 OINTMENT TOPICAL at 08:56

## 2023-03-02 RX ADMIN — OLANZAPINE 5 MG: 5 TABLET, ORALLY DISINTEGRATING ORAL at 08:45

## 2023-03-02 RX ADMIN — GUAIFENESIN 600 MG: 600 TABLET, EXTENDED RELEASE ORAL at 08:46

## 2023-03-02 RX ADMIN — RIFAXIMIN 550 MG: 550 TABLET ORAL at 08:46

## 2023-03-02 RX ADMIN — RAMELTEON 8 MG: 8 TABLET, FILM COATED ORAL at 20:56

## 2023-03-02 RX ADMIN — CALCIUM ACETATE 667 MG: 667 CAPSULE ORAL at 17:03

## 2023-03-02 RX ADMIN — LACOSAMIDE 100 MG: 100 TABLET, FILM COATED ORAL at 00:03

## 2023-03-02 RX ADMIN — TACROLIMUS 1 MG: 1 CAPSULE ORAL at 08:45

## 2023-03-02 ASSESSMENT — ACTIVITIES OF DAILY LIVING (ADL)
ADLS_ACUITY_SCORE: 60
ADLS_ACUITY_SCORE: 60
ADLS_ACUITY_SCORE: 58
ADLS_ACUITY_SCORE: 56
ADLS_ACUITY_SCORE: 60
ADLS_ACUITY_SCORE: 58
ADLS_ACUITY_SCORE: 60
ADLS_ACUITY_SCORE: 58
ADLS_ACUITY_SCORE: 60
ADLS_ACUITY_SCORE: 58

## 2023-03-02 NOTE — PROGRESS NOTES
[facility]    Nephrology Progress Note    Assessment & Plan     <ESRD, ON HD    ~Tolerating treatments well. Will remove 2kg tomorrow.     - MWF HD    <ANEMIA OF RENAL DISEASE    ~hgb 9.2. On max dose NORA.     -Retacrit 10,000 with dialysis              Interval History     Has no new complaints. Main ongoing symptoms he tells me is fatigue.        Temp: 98.7  F (37.1  C) Temp src: Oral BP: 106/70 Pulse: 78   Resp: 16 SpO2: 95 % O2 Device: None (Room air)      Vitals:    02/17/23 0500 02/22/23 0500 02/27/23 1219   Weight: 87.6 kg (193 lb 2 oz) 82.3 kg (181 lb 7 oz) 84.2 kg (185 lb 10 oz)       Vital Signs with Ranges    Temp:  [97.8  F (36.6  C)-98.7  F (37.1  C)] 98.7  F (37.1  C)  Pulse:  [78-94] 78  Resp:  [16-18] 16  BP: (105-131)/(69-81) 106/70  SpO2:  [92 %-95 %] 95 %    I/O last 3 completed shifts:  In: 880 [P.O.:880]  Out: -     Physical Exam  Vitals and nursing note reviewed.   Constitutional:       Appearance: Normal appearance.   HENT:      Head: Normocephalic.      Nose: Nose normal.      Mouth/Throat:      Mouth: Mucous membranes are moist.   Cardiovascular:      Rate and Rhythm: Normal rate.   Pulmonary:      Effort: Pulmonary effort is normal.      Breath sounds: Normal breath sounds.   Musculoskeletal:      Comments: Trace edema in legs and on trunk/thighs.    Skin:     General: Skin is warm.      Coloration: Skin is pale.   Neurological:      General: No focal deficit present.      Mental Status: He is alert.   Psychiatric:      Comments: Affect somewhat depressed         BP Readings from Last 5 Encounters:   03/02/23 106/70   01/21/23 120/71   12/29/22 (!) 139/94   12/16/22 101/63   12/16/22 117/75        Wt Readings from Last 10 Encounters:   02/27/23 84.2 kg (185 lb 10 oz)   01/21/23 82.4 kg (181 lb 11.2 oz)   12/28/22 96 kg (211 lb 10.3 oz)   12/16/22 100.2 kg (221 lb)   12/16/22 100.2 kg (221 lb)   12/15/22 87 kg (191 lb 12.8 oz)   10/07/19 137.5 kg (303 lb 3.2 oz)    10/04/19 131.5 kg (290 lb)   02/20/19 140.4 kg (309 lb 9.6 oz)   07/10/18 137.5 kg (303 lb 3.2 oz)           Medications           - MEDICATION INSTRUCTIONS for Dialysis Patients -   Does not apply See Admin Instructions     apixaban ANTICOAGULANT  5 mg Oral BID     calcium acetate (phos binder)  667 mg Oral TID w/meals     folic acid  1 mg Oral or Feeding Tube Daily     guaiFENesin  600 mg Oral BID     lacosamide  100 mg Oral Q12H     lactulose  10 g Oral BID     levETIRAcetam  1,000 mg Oral Q24H     lidocaine  1 patch Transdermal Q24H     lidocaine   Transdermal Q8H BIJU     midodrine  10 mg Oral or Feeding Tube TID w/meals     mineral oil-hydrophilic petrolatum   Topical Daily     multivitamin RENAL  1 capsule Oral Daily     - MEDICATION INSTRUCTIONS -   Does not apply Once     [Held by provider] nystatin  500,000 Units Swish & Spit 4x Daily     OLANZapine zydis  5 mg Oral BID     pantoprazole  40 mg Oral QAM AC     ramelteon  8 mg Oral QPM     rifaximin  550 mg Oral or Feeding Tube BID     sodium chloride (PF)  10-30 mL Intracatheter Q8H     sodium chloride (PF)  3 mL Intracatheter Q8H     sodium chloride (PF) 0.9%  10 mL Intracatheter Once in dialysis/CRRT     sodium chloride (PF) 0.9%  10 mL Intracatheter Once in dialysis/CRRT     tacrolimus  1 mg Oral Q12H     cholecalciferol  50 mcg Oral Daily       Data     UA RESULTS:  Recent Labs   Lab Test 11/25/22  1859 10/31/17  1038   COLOR Yellow [place]   APPEARANCE Slightly Cloudy* Slightly Cloudy   URINEGLC Negative Negative   URINEBILI Negative Negative   URINEKETONE Negative 5*   SG 1.017 1.021   UBLD Moderate* Negative   URINEPH 5.5 5.0   PROTEIN 70* Negative   NITRITE Negative Negative   LEUKEST Small* Negative   RBCU  --  <1   WBCU  --  1      BMP  Recent Labs   Lab 03/02/23  1002 02/27/23  1149 02/25/23  1448 02/24/23  0921    137 134*  --    POTASSIUM 5.0 4.2 4.5  --    CHLORIDE 97* 99 94*  --    KELLY 9.7 8.6 9.5  --    CO2 31* 29 31*  --    BUN 36.2*  21.6* 27.6*  --    CR 3.46* 2.49* 3.38*  --    * 82 111* 91     Phos@[email or web address](phos:4)  CBC)  Recent Labs   Lab 03/02/23  1002 02/27/23  1149 02/25/23  1448   WBC 3.8* 3.4*  --    HGB 9.2* 9.3* 8.9*   HCT 32.2* 32.7*  --    * 104*  --    * 133*  --      Lab Results   Component Value Date    ALBUMIN 2.7 03/02/2023    ALBUMIN 2.8 02/25/2023    ALBUMIN 2.4 02/22/2023    ALBUMIN 1.8 12/29/2022    ALBUMIN 1.7 12/28/2022    ALBUMIN 2.1 12/27/2022    ALBUMIN 3.9 04/20/2020    ALBUMIN 3.8 10/07/2019    ALBUMIN 3.8 02/20/2019        Recent Labs   Lab 03/02/23  1002   HGB 9.2*   HCT 32.2*   *       Recent Labs   Lab 03/02/23  1002   AST 17   ALT 6*   ALKPHOS 182*   BILITOTAL 0.5     No lab results found in last 7 days.  25 OH Vit D2   Date Value Ref Range Status   09/24/2016 <5 ug/L Final     25 OH Vitamin D2   Date Value Ref Range Status   02/16/2023 <5 ug/L Final     25 OH Vit D3   Date Value Ref Range Status   09/24/2016 8 ug/L Final     25 OH Vitamin D3   Date Value Ref Range Status   02/16/2023 37 ug/L Final     25 OH Vit D total   Date Value Ref Range Status   09/24/2016 (L) 20 - 75 ug/L Final    <13  Season, race, dietary intake, and treatment affect the concentration of   25-hydroxy-Vitamin D. Values may decrease during winter months and increase   during summer months. Values 20-29 ug/L may indicate Vitamin D insufficiency   and values <20 ug/L may indicate Vitamin D deficiency.   This test was developed and its performance characteristics determined by the   [facility],  Special Chemistry Laboratory. It has   not been cleared or approved by the FDA. The laboratory is regulated under CLIA   as qualified to perform high-complexity testing. This test is used for clinical   purposes. It should not be regarded as investigational or for research.       25 OH Vit D Total   Date Value Ref Range Status   02/16/2023 <42 20 - 75 ug/L Final     Comment:     Season, race,  dietary intake, and treatment affect the concentration of 25-hydroxy-Vitamin D. Values may decrease during winter months and increase during summer months. Values 20-29 ug/L may indicate Vitamin D insufficiency and values <20 ug/L may indicate Vitamin D deficiency.     No results for input(s): PTHI in the last 168 hours.    Attestation:   I have reviewed today's relevant vital signs, notes, medications, labs and imaging.

## 2023-03-02 NOTE — PROGRESS NOTES
Care Management Follow Up    Length of Stay (days): 40    Expected Discharge Date: 03/03/2023     Concerns to be Addressed: adjustment to diagnosis/illness, care coordination/care conferences, discharge planning     Patient plan of care discussed at interdisciplinary rounds: Yes     Anticipated Discharge Disposition: TCU     Anticipated Discharge Services:    Anticipated Discharge DME:      Patient/family educated on Medicare website which has current facility and service quality ratings:    Education Provided on the Discharge Plan:    Patient/Family in Agreement with the Plan: yes    Referrals Placed by CM/PAULIE:    Private pay costs discussed: Not applicable    Additional Information:  PAULIE called [facility] and informed they are still completing onsite dialysis. PAULIE sent referral via DOD. PAULIE called spouse to provide update and was informed spouse is preparing a patient and unable to talk at this time.    Addendum: PAULIE spoke with wife who is in agreement with referral to [facility] and additional referrals being sent out. SW sent additional referrals via DOD.      ADILENE Crowley    [facility]

## 2023-03-02 NOTE — PLAN OF CARE
Goal Outcome Evaluation:      Plan of Care Reviewed With: patient    DATE & TIME: 3/1/23 3707-0047  Cognitive Concerns/ Orientation: A&O x2-3. Forgetful, argumentative and irritable at times.   BEHAVIOR & AGGRESSION TOOL COLOR: Green, able to discontinue sitter after dialysis today, cooperative.   ABNL VS/O2: VSS on RA  MOBILITY: Assist of strong 2 with mark steady. Repositions self frequently   PAIN MANAGMENT:Denies pain  DIET: Regular diet w mildly thick liquids (level 2); improved appetite, continue to refuse TID TF boluses.   BOWEL/BLADDER: Continent of bowel, BM x3 this shift. Pt on HD   DRAIN/DEVICES: Refusing new PIV, MD aware. Rt external jugular tunneled CVC for hemodialysis. Double lumen PEG tube, clamped.   SKIN: Jaundiced, Scattered scabs and bruising. Blanchable redness to coccyx. Light redness left elbow, Mepilex in place.  TEST/PROCEDURES: S/p HD today  D/C DATE: Discharge pending placement  OTHER IMPORTANT INFO:

## 2023-03-02 NOTE — PROGRESS NOTES
CLINICAL NUTRITION SERVICES - REASSESSMENT NOTE      Recommendations:     Will send a GelateinPLUS with breakfast and lunch, and a Magic Cup with dinner (these will provide 590 cals, 49 gm pro)  Continue to see daily for meal orders  Encouraged good po intake of meals/supplements    Recommend pt accept bolus feeding if meal intake is <50%       Malnutrition: (1/22)  % Weight Loss:  > 5% in 1 month (severe malnutrition) - wt loss PTA, has been stable over the past month of admission  % Intake:  </= 50% for >/= 5 days (severe malnutrition)- suspected PTA  Subcutaneous Fat Loss:  Orbital region moderate depletion, Upper arm region moderate-severe depletion and Thoracic region moderate-severe depletion  Muscle Loss:  Temporal region moderate depletion, Clavicle bone region severe depletion, Acromion bone region severe depletion, Patellar region moderate depletion and Anterior thigh region moderate-severe depletion  Fluid Retention:  Trace     Malnutrition Diagnosis: Severe malnutrition  In Context of:  Acute on Chronic illness or disease       EVALUATION OF PROGRESS TOWARD GOALS   Diet:    Regular, Mildly Thick Liquids  Room Service with Assist  Lunch - GelateinPLUS (150 cals, 20 gm  Pro)  Dinner - Magic Cup (290 cals, 9 gm pro)    Nutrition Support:    Type of Feeding Tube: G-tube  Enteral Frequency:  bolus feeding  Enteral Regimen: Novasource Renal at 80 mL/hr x 3 hours after dinner + PRN back up bolus for breakfast or lunch if consumes <75% of those meals  Per Bolus Provisions: 240 mL formula = 480 kcal, 22 g protein, 44 g CHO, 0 g fiber and 172 mL free water  Free Water Flush: 100 mL before and after each feeding    Intake/Tolerance:    Chart reviewed    Meds: Nephrocaps, Phoslo, lactulose    3/1: BM x4 (lactulose held today)    3/2: Na 138         K 5.0    Per flowsheets, meal intake yesterday was %  Note pt did have food from home  Per meal records, pt has been ordering ~2400 cals/day and ~125 gm  pro/day    Visited with pt as he was eating lunch - hamburger, fruit  He was eating well - had consumed almost 100%  Upset that he didn't get his GelateinPLUS (not sure why it wasn't on his tray)  He tells me that he likes the GelateinPLUS and the Magic Cup - eating all of them  Has been doing well with his meals - happy his diet was changed to 'regular', but frustrated that some fresh fruit is limited (2' to juice in the fruit)  Pt has been declining the bolus fdgs - states he would rather eat  Discussed accepting bolus feeding if he eats <50% of his meal        ASSESSED NUTRITION NEEDS:  Dosing Weight (2/27) 84.2 kg   Estimated Energy Needs: 1517-0581 kcals (25-30 Kcal/Kg)  Justification: dialysis  Estimated Protein Needs: 100-125 grams protein (1.2-1.5 g pro/Kg)  Justification: dialysis      NEW FINDINGS:     Wt stable over the past month    02/27/23 1219 84.2 kg (185 lb 10 oz) Bed scale   02/22/23 0500 82.3 kg (181 lb 7 oz) Bed scale   02/17/23 0500 87.6 kg (193 lb 2 oz) Bed scale   02/11/23 0339 86 kg (189 lb 9.5 oz) --   02/10/23 0425 84.6 kg (186 lb 8.2 oz) Bed scale   01/31/23 0510 84.5 kg (186 lb 4.6 oz) Bed scale   01/30/23 0500 86.4 kg (190 lb 7.6 oz) Bed scale   01/28/23 0500 88 kg (194 lb 0.1 oz) Bed scale   01/26/23 0538 86 kg (189 lb 9.5 oz) Bed scale   01/23/23 0051 88.2 kg (194 lb 7.1 oz) Bed scale       Previous Goals (2/27):   Patient will consume 75% meals TID plus supplements daily vs receive at least 1 bolus feeding/day   Evaluation: partially met - pt is eating well, but has been declining the bolus feeding    Weight maintenance >82 kg   Evaluation: no new wt to assess    Previous Nutrition Diagnosis (2/27):   Inadequate oral intake related to variable PO w/ appetite and restricted diet w/ dysphagia as evidenced by need for back up bolus 1x daily.  Evaluation: Improving        CURRENT NUTRITION DIAGNOSIS  No nutrition diagnosis identified at this time     INTERVENTIONS  Recommendations /  Nutrition Prescription  Regular, Mildly Thick Liquids    Will send a GelateinPLUS with breakfast and lunch, and a Magic Cup with dinner (these will provide 590 cals, 49 gm pro)  Continue to see daily for meal orders  Encouraged good po intake of meals/supplements    Recommend pt accept bolus feeding if meal intake is <50%      Goals  Pt to consume >/= 75% meals and 3 supplements/day      MONITORING AND EVALUATION:  Progress towards goals will be monitored and evaluated per protocol and Practice Guidelines

## 2023-03-02 NOTE — PLAN OF CARE
Goal Outcome Evaluation:      Plan of Care Reviewed With: patient    Overall Patient Progress: no change    DATE & TIME: 3/1/23 6935-5700  Cognitive Concerns/ Orientation: A&O x2-3. Forgetful. Can be irritable at times. Slept most of the night  BEHAVIOR & AGGRESSION TOOL COLOR: Green   ABNL VS/O2: VSS on RA  MOBILITY: Strong Ax2 with mark ceballosdy. Repositions self frequently in bed  PAIN MANAGMENT: Denies pain  DIET: Regular diet w mildly thick liquids (level 2); improved appetite, continue to refuse TID TF boluses.   BOWEL/BLADDER: Continent of bowel, Lactulose held due to titration parameters. Pt on HD   DRAIN/DEVICES: Refusing new PIV, MD aware. Rt external jugular tunneled CVC for hemodialysis (dressing change needed in AM). Double lumen PEG tube, clamped.   SKIN: Jaundiced, Scattered scabs and bruising. Blanchable redness to coccyx. Light redness left elbow, Mepilex in place.  TEST/PROCEDURES: HD MWF  D/C DATE: Discharge pending placement  OTHER IMPORTANT INFO:

## 2023-03-03 ENCOUNTER — APPOINTMENT (OUTPATIENT)
Dept: SPEECH THERAPY | Facility: CLINIC | Age: 58
DRG: 981 | End: 2023-03-03
Attending: STUDENT IN AN ORGANIZED HEALTH CARE EDUCATION/TRAINING PROGRAM
Payer: COMMERCIAL

## 2023-03-03 ENCOUNTER — APPOINTMENT (OUTPATIENT)
Dept: GENERAL RADIOLOGY | Facility: CLINIC | Age: 58
DRG: 981 | End: 2023-03-03
Attending: INTERNAL MEDICINE
Payer: COMMERCIAL

## 2023-03-03 LAB
HBV CORE AB SERPL QL IA: NONREACTIVE
HBV SURFACE AG SERPL QL IA: NONREACTIVE

## 2023-03-03 PROCEDURE — 634N000001 HC RX 634: Performed by: INTERNAL MEDICINE

## 2023-03-03 PROCEDURE — 250N000013 HC RX MED GY IP 250 OP 250 PS 637: Performed by: INTERNAL MEDICINE

## 2023-03-03 PROCEDURE — 92611 MOTION FLUOROSCOPY/SWALLOW: CPT | Mod: GN

## 2023-03-03 PROCEDURE — 120N000001 HC R&B MED SURG/OB

## 2023-03-03 PROCEDURE — 90937 HEMODIALYSIS REPEATED EVAL: CPT

## 2023-03-03 PROCEDURE — 250N000012 HC RX MED GY IP 250 OP 636 PS 637: Performed by: STUDENT IN AN ORGANIZED HEALTH CARE EDUCATION/TRAINING PROGRAM

## 2023-03-03 PROCEDURE — 250N000013 HC RX MED GY IP 250 OP 250 PS 637

## 2023-03-03 PROCEDURE — 250N000011 HC RX IP 250 OP 636: Performed by: STUDENT IN AN ORGANIZED HEALTH CARE EDUCATION/TRAINING PROGRAM

## 2023-03-03 PROCEDURE — 99232 SBSQ HOSP IP/OBS MODERATE 35: CPT | Performed by: INTERNAL MEDICINE

## 2023-03-03 PROCEDURE — 250N000013 HC RX MED GY IP 250 OP 250 PS 637: Performed by: STUDENT IN AN ORGANIZED HEALTH CARE EDUCATION/TRAINING PROGRAM

## 2023-03-03 PROCEDURE — 258N000003 HC RX IP 258 OP 636: Performed by: INTERNAL MEDICINE

## 2023-03-03 PROCEDURE — 250N000013 HC RX MED GY IP 250 OP 250 PS 637: Performed by: HOSPITALIST

## 2023-03-03 PROCEDURE — 74230 X-RAY XM SWLNG FUNCJ C+: CPT

## 2023-03-03 PROCEDURE — 250N000011 HC RX IP 250 OP 636: Performed by: INTERNAL MEDICINE

## 2023-03-03 PROCEDURE — 92526 ORAL FUNCTION THERAPY: CPT | Mod: GN

## 2023-03-03 PROCEDURE — 90935 HEMODIALYSIS ONE EVALUATION: CPT | Performed by: INTERNAL MEDICINE

## 2023-03-03 RX ADMIN — SODIUM CHLORIDE 250 ML: 9 INJECTION, SOLUTION INTRAVENOUS at 09:48

## 2023-03-03 RX ADMIN — OLANZAPINE 5 MG: 5 TABLET, ORALLY DISINTEGRATING ORAL at 22:08

## 2023-03-03 RX ADMIN — HEPARIN SODIUM 1900 UNITS: 1000 INJECTION INTRAVENOUS; SUBCUTANEOUS at 09:56

## 2023-03-03 RX ADMIN — OLANZAPINE 5 MG: 5 TABLET, ORALLY DISINTEGRATING ORAL at 12:51

## 2023-03-03 RX ADMIN — RIFAXIMIN 550 MG: 550 TABLET ORAL at 21:01

## 2023-03-03 RX ADMIN — LACOSAMIDE 100 MG: 100 TABLET, FILM COATED ORAL at 13:50

## 2023-03-03 RX ADMIN — LEVETIRACETAM 1000 MG: 500 TABLET, FILM COATED ORAL at 00:18

## 2023-03-03 RX ADMIN — TACROLIMUS 1 MG: 1 CAPSULE ORAL at 21:01

## 2023-03-03 RX ADMIN — OLANZAPINE 5 MG: 5 TABLET, ORALLY DISINTEGRATING ORAL at 21:00

## 2023-03-03 RX ADMIN — HEPARIN SODIUM 1900 UNITS: 1000 INJECTION INTRAVENOUS; SUBCUTANEOUS at 09:55

## 2023-03-03 RX ADMIN — APIXABAN 5 MG: 5 TABLET, FILM COATED ORAL at 13:53

## 2023-03-03 RX ADMIN — ONDANSETRON 4 MG: 4 TABLET, ORALLY DISINTEGRATING ORAL at 12:51

## 2023-03-03 RX ADMIN — CALCIUM ACETATE 667 MG: 667 CAPSULE ORAL at 17:17

## 2023-03-03 RX ADMIN — TACROLIMUS 1 MG: 1 CAPSULE ORAL at 13:53

## 2023-03-03 RX ADMIN — LACOSAMIDE 100 MG: 100 TABLET, FILM COATED ORAL at 00:18

## 2023-03-03 RX ADMIN — MIDODRINE HYDROCHLORIDE 10 MG: 5 TABLET ORAL at 07:38

## 2023-03-03 RX ADMIN — LEVETIRACETAM 1000 MG: 500 TABLET, FILM COATED ORAL at 22:07

## 2023-03-03 RX ADMIN — EPOETIN ALFA-EPBX 10000 UNITS: 10000 INJECTION, SOLUTION INTRAVENOUS; SUBCUTANEOUS at 09:49

## 2023-03-03 RX ADMIN — SODIUM CHLORIDE 300 ML: 9 INJECTION, SOLUTION INTRAVENOUS at 09:47

## 2023-03-03 RX ADMIN — APIXABAN 5 MG: 5 TABLET, FILM COATED ORAL at 21:01

## 2023-03-03 RX ADMIN — ACETAMINOPHEN 650 MG: 325 TABLET, FILM COATED ORAL at 07:37

## 2023-03-03 RX ADMIN — ACETAMINOPHEN 650 MG: 325 TABLET, FILM COATED ORAL at 17:17

## 2023-03-03 RX ADMIN — MIDODRINE HYDROCHLORIDE 10 MG: 5 TABLET ORAL at 17:16

## 2023-03-03 RX ADMIN — GUAIFENESIN 600 MG: 600 TABLET, EXTENDED RELEASE ORAL at 21:00

## 2023-03-03 RX ADMIN — LACTULOSE 10 G: 10 SOLUTION ORAL at 21:00

## 2023-03-03 RX ADMIN — MIDODRINE HYDROCHLORIDE 10 MG: 5 TABLET ORAL at 13:49

## 2023-03-03 RX ADMIN — PANTOPRAZOLE SODIUM 40 MG: 40 TABLET, DELAYED RELEASE ORAL at 07:38

## 2023-03-03 RX ADMIN — RAMELTEON 8 MG: 8 TABLET, FILM COATED ORAL at 21:01

## 2023-03-03 ASSESSMENT — ACTIVITIES OF DAILY LIVING (ADL)
ADLS_ACUITY_SCORE: 58

## 2023-03-03 NOTE — PROGRESS NOTES
[facility]    Medicine Progress Note - Hospitalist Service    Date of Admission:  1/21/2023    Assessment & Plan   Blanco Osborne is a 57 year-old male admitted on 1/21/2023 as a transfer from [facility] for evaluation of loculated pleural effusion. He has extensive PMH including h/o liver transplant 2016 due to alcohol abuse, pAF on chronic anticoagulation, history of diabetes mellitus type 2, CVA, hypertension, CHRISTIAN on BiPAP.  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 He was recovering in [facility] and was transferred to [facility] for consideration of chest tube placement and possible intrapleural lysis for worsening effusion.     Acute metabolic encephalopathy with delirium, multifactorial  Hepatic encephalopathy  Chronic liver disease, history of liver transplant 2016  End-stage renal disease (ESRD), on hemodialysis (HD)  History of seizure, on Keppra and Vimpat  Waxing and waning mentation, coinciding with lactulose being held at [facility]  Earlier in hospital stay, remained confused and had pulled out tracheostomy tube, PICC line and pulled his dialysis catheter.  Lines replaced.  Palliative care consult 1/26, see note.  * Psychiatry consulted 2/2, see note, follow-up requested.    On/off fluctuating mentation, recently improving     Continue to reorient as needed; maintain normal day/night, sleep wake cycles; minimize sedating/altering medications as able    Continue Lactulose to 10 mg BID, monitor for symptoms to have 2-3 Bms    Zyprexa 5 mg at bedtime scheduled.  Melatonin 3 mg p.o. at bedtime scheduled    Olanzapine BID as needed for severe agitation or anxiety; monitor for side effets    Mittens/sitter as needed; minimize use of restraints as able    Ongoing hemodialysis, as  per nephrology, on M-W-F dialysis schedule; ongoing nephrology consult follow-up appreciated    2/20/23 Off restraints.Mentation has improved    2/21/23 -Fall this morningWorsening.Delirium. Ramelteon started.Psyciatry consult appreciated  2/22/23 Improved mentation     2/28: Appreciate psychiatry evaluation: Decreased Zyprexa to 5 mg twice daily and continuing 5 mg twice daily as needed.  Also continue Ramelteon 8 mg nightly    3/3: BETTER    Bilateral pleural effusions   Acute respiratory failure requiring tracheostomy  - resolved    Pneumonia  - resolved   ARDS  - resolved    Right pneumothorax - resolved   *Initial event was parainfluenza and strep pneumo pneumonia back in November 2022. Treated for ARDS. Had failed extubation x2 and tracheostomy performed on previous hospitalization. *Had additional complications of bilateral pneumothoraces as well as hydropneumothorax- pleural fluid grew candida parapsilosis  *Completed 4-week antifungal treatment. While in LTACH he was successfully weaned from the ventilator.  *Recently there were concern for worsening effusion while at [facility] and had thoracentesis 01/13 which returned exudative without growth on cultures. CT chest/abdomen/pelvis on 01/18 demonstrated worsening effusions and concerns for infected parapneumonic effusions with possible SBP.  Multiple pulmonologist at [facility] reviewed CT scan recommended transfer to [facility] for consideration of chest tube placement and possible intrapleural lysis  *Thoracic surgery (Dr. Jackson) consulted during admission   *CT chest 1/22, small effusions on imaging and so chest tube was not inserted.   ID consulted, see note 2/10.  *Patient pulled out his tracheostomy tube on the night 2/1-2/2 and is tolerating well without increased shortness of breath persistent cough or worsening hypoxia  * Chest x-ray 2/3 with cardiomegaly, chronic small bilateral pleural effusions, no pulmonary edema; right internal jugular dialysis  "catheter in place    Monitor respiratory status, recently stable on room air    Encourage incentive spirometry as able    Wound care previously consulted for tracheostomy site care, monitor for signs and symptoms of infection    - Now Stable on RA   Discontinued nystatin 3/01    S/p liver transplant 2016   Chronic immunosuppression, on tacrolimus  Cirrhosis with ascites  Subacute bacterial peritonitis (SBP), resolved  *Main manifestations of this are hepatic encephalopathy and ascites.  Hepatologist at [place] felt that most likely reason for the cirrhosis was metabolic syndrome or alcohol intake.  There was no evidence of rejection on biopsy and there was some evidence of regeneration. Paracentesis done on 12/22/2022 showed lactobacillus indicating SBP, treated with Unasyn and Augmentin, finished 2-week course 1/12/2023.  Requires large-volume paracentesis periodically for recurring ascites.    *Paracentesis 1/13/2023 yielded about 7500 cc. Cultures NGTD.  Most recent paracentesis on 1/24 with 4.8 L fluid removal    Continue intermittent paracentesis as needed if develops symptomatic ascites    Monitor for signs and symptoms of recurrent spontaneous bacterial peritonitis     Further treatment for recurrent ascites with TIPS deferred to his Liver Transplant team and/or Hepatology, he has an appointment on 4/21/2023 with Hepatology, Dr. Simms    Continue Tacrolimus 1 mg BID.    Continue rifaximin 550 mg BID    Continue lactulose as ordered, titrate as needed to have 2-3 bms    GI consult as needed in hospital for new acute issues, otherwise as outpatient     Goals of care   As per prior rounding provider, \"on 1/25 nursing had mentioned that patient had refused to undergo dialysis and want to stop care, palliative care was consulted.  Patient and his spouse denied wanting to stop care and wanted ongoing restorative care including full code\"    Monitor, Full Code status    -Needs placement to TCU     Diarrhea, " improved, on lactulose for chronic liver disease  - C difficile negative on 1/31. Secondary to lactulose.  - discontinued rectal tube (2/12) as stools more formed    Continue lactulose with goal of 2 to 3 soft bowel movement in 24 hours     Paroxysmal atrial fibrillation  Chronic anticoagulation, apixaban  Remains in sinus rhythm, on anticoagulation with apixaban indefinitely for stroke prophylaxis.      Monitor rhythm    Continue apixaban anticoagulation    Monitor hemoglobin, see below     Acute anemia  Pt has required intermittent transfusions for on-going anemia.  Anemia most likely secondary to critical illness/chronic disease, chronic renal disease.  Baseline hemoglobin around 7-8  Likely Epo resistance    Transfuse packed red blood cells to keep hemoglobin > 7    Epoetin lfa-epbx use as per nephrology     History PEA arrest   PEA arrest prior to his previous admission and 1 episode of PEA associated with desaturation on 12/20.  No structural cardiac disease.     Stable, continue cardiac Monitoring     Chronic Hypotension  In the past has developed baseline hypotension with cirrhosis and prolonged critical illness.  On hemodialysis.  Receiving midodrine and albumin during dailysis    Continue midodrine, as per nephrology      History of seizure   After hypoxic event, in the context of baseline multifactorial encephalopathy secondary to his ongoing critical illness and prior cardiac arrests and prior strokes and cirrhosis with hepatic encephalopathy. MRI of head of 12/20/22 reveals no PRES or leptomeningeal enhancement but scattered.  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   No recent seizure activity.    Continue levetiracetam, lacosamide     Seizure precautions    Outpatient follow-up with neurology, consult inpatient if needed     # ESRD  # Anemia due to renal disease  # Hypotension during dialysis  -Underwent dialysis  on 2/22/23 and was hypotensive  and received midodrine and albumin  Receiving albumin with dialysis       Diabetes mellitus type 2  *Hemoglobin A1c on November 2022 was 4.7  *By report, on Lantus insulin in the past.  Blood sugar checks and sliding scale insulin previously discontinued as has been stable without insulin needs    Monitor with periodic blood sugar checks as needed     Severe malnutrition, protein and calorie type  Moderate dysphagia  G-tube feedings    Continue with tube feeds via PEG tube    IR replaced the PEG tube on 2/8/2023, monitor    Nutritionist consulted and following for tube feeds    SLP following as well. Oral diet as per speech and language therapy (SLP)     2/20 Nutrition following for tube feeds    Underwent video swallow on 2/23/22     3/1: Change PPI to daily as per family's request    Anxiety  Fluoxetine was discontinued as per psychiatry recommendation on 2/2 (see consult note for details)     Stable, monitor; comfort and reassurance offered during visit    Psychiatry follow-up       Diet: Room Service  Combination Diet Regular Diet; Mildly Thick (level 2)  Snacks/Supplements Adult: Other; Gelatein+ with brkfst and lunch, and magic cup with dinner (RD); With Meals  Adult Formula Bolus Feeding: Novasource Renal; Route: Gastrostomy; 3 times daily; Volume per Bolus: 240; mL(s); Continue to encourage after meal bolus at 80 mL/hr x 3 hrs, If patient consumes <50% of that meal    DVT Prophylaxis: DOAC  Nixon Catheter: Not present  Lines: PRESENT      CVC Double Lumen Right External jugular Tunneled-Site Assessment: WDL      Cardiac Monitoring: None  Code Status: Full Code      Clinically Significant Risk Factors           # Hypercalcemia: corrected calcium is >10.1, will monitor as appropriate    # Hypoalbuminemia: Lowest albumin = 1.9 g/dL at 1/23/2023  6:38 AM, will monitor as appropriate   # Thrombocytopenia: Lowest platelets = 126 in last 2 days, will monitor for bleeding          # Severe Malnutrition: based on  nutrition assessment        Disposition Plan      Expected Discharge Date: 03/04/2023, 12:00 PM  Discharge Delays: Placement - LTC  Destination: inpatient rehabilitation facility  Discharge Comments: admitted on 1-21 from [facility], Dialysis pt MWF. Will need placement        Pako Tan MD  Hospitalist Service  [facility]  Securely message with [employer] (more info)  Text page via [employer] Paging/Directory   ______________________________________________________________________    Interval History   No specific new concerns.     C/o discomfort in throat    Physical Exam   /76 (BP Location: Left arm)   Pulse 84   Temp 98.4  F (36.9  C) (Oral)   Resp 20   Wt 84.2 kg (185 lb 10 oz)   SpO2 94%   BMI 25.18 kg/m    Gen- pleasant   Throat: no redness/ pus pointer/ candida  Neck- supple  CVS- I+II+ ESM  RS- CTAB, decreased at base   Abdo- soft, no tenderness. No g/r/r   Ext- no edema     Medical Decision Making       40 MINUTES SPENT BY ME on the date of service doing chart review, history, exam, documentation & further activities per the note.      Data   ------------------------- PAST 24 HR DATA REVIEWED -----------------------------------------------        Imaging results reviewed over the past 24 hrs:   No results found for this or any previous visit (from the past 24 hour(s)).

## 2023-03-03 NOTE — PLAN OF CARE
Goal Outcome Evaluation:      Plan of Care Reviewed With: patient    DATE & TIME: 3/2/23 5659-4209  Cognitive Concerns/ Orientation: A&O x2-3. Forgetful. Can be irritable at times. Slept most of the night  BEHAVIOR & AGGRESSION TOOL COLOR: Green   ABNL VS/O2: VSS on RA  MOBILITY: Strong Ax2 with mark gr. Repositions self frequently in bed; up in chair multiple times, needed to utilize lift later in shift due to pt becoming lethargic  PAIN MANAGMENT: Denies pain  DIET: Regular diet w mildly thick liquids (level 2); improved appetite, continue to refuse TID TF boluses.   BOWEL/BLADDER: Continent of bowel  DRAIN/DEVICES: Refusing new MD DOUGLAS aware. Rt external jugular tunneled CVC for hemodialysis (dressing change needed in AM). Double lumen PEG tube, clamped.   SKIN: Jaundiced, Scattered scabs and bruising. Blanchable redness to coccyx. Light redness left elbow, Mepilex in place.  TEST/PROCEDURES: HD MWF  D/C DATE: Discharge pending placement  OTHER IMPORTANT INFO:

## 2023-03-03 NOTE — PROGRESS NOTES
SP[facility]UAL [facility] Progress Note       66     Visited with Blanco, his spouse, and a friend at the bedside today per follow-up plan of care.    Blanco noted that he had just gotten back from having dialysis and was doing well. We reflected briefly on the continued length of this admission and on the progress that he feels he has made. Family note that they continue to have many people supporting and praying for Blanco. Their goals of care remain restorative.    At the end of this visit, I offered a brief prayer and family continue to note appreciation for  support.     I continue to follow this pt while on unit 66. Please consult if any additional or immediate needs arise.      ------  Vitor Palacios M.Div.  Resident   Pager: [phone]

## 2023-03-03 NOTE — PROGRESS NOTES
Potassium   Date Value Ref Range Status   03/02/2023 5.0 3.4 - 5.3 mmol/L Final   12/29/2022 3.4 3.4 - 5.3 mmol/L Final   04/20/2020 3.9 3.4 - 5.3 mmol/L Final     Potassium POCT   Date Value Ref Range Status   01/19/2023 3.6 3.5 - 5.0 mmol/L Final     Hemoglobin   Date Value Ref Range Status   03/02/2023 9.2 (L) 13.3 - 17.7 g/dL Final   04/20/2020 14.3 13.3 - 17.7 g/dL Final     Creatinine   Date Value Ref Range Status   03/02/2023 3.46 (H) 0.67 - 1.17 mg/dL Final   04/20/2020 1.06 0.66 - 1.25 mg/dL Final     Urea Nitrogen   Date Value Ref Range Status   03/02/2023 36.2 (H) 6.0 - 20.0 mg/dL Final   12/29/2022 46 (H) 7 - 30 mg/dL Final   04/20/2020 23 7 - 30 mg/dL Final     Sodium   Date Value Ref Range Status   03/02/2023 138 136 - 145 mmol/L Final   04/20/2020 139 133 - 144 mmol/L Final     INR   Date Value Ref Range Status   12/21/2022 1.36 (H) 0.85 - 1.15 Final   10/07/2019 1.20 (H) 0.86 - 1.14 Final       DIALYSIS PROCEDURE NOTE  Hepatitis status of previous patient on machine log was checked and verified ok to use with this patients hepatitis status.  Patient dialyzed for 3 hrs. on a K2 bath with a net fluid removal of  0.7L.  A BFR of 350 ml/min was obtained via a CVC.      The treatment plan was discussed with Dr. Fitzpatrick during the treatment.    Total heparin received during the treatment: 0 units.     Line flushed, clamped and capped with heparin 1:1000 1.9 mL (1900 units) per lumen    Meds  given: epogen   Complications: none      Person educated: pre-tx. Knowledge base minimal. Barriers to learning: none. Educated on procedure via verbal mode. Patient/family verbalized understanding.     ICEBOAT? Timeout performed pre-treatment  I: Patient was identified using 2 identifiers  C:  Consent Signed Yes  E: Equipment preventative maintenance is current and dialysis delivery system OK to use  B: Hepatitis B Surface Antigen: negative; Draw Date: 1/31/23      Hepatitis B Surface Antibody: suseptible; Draw Date:  1/31/23  O: Dialysis orders present and complete prior to treatment  A: Vascular access verified and assessed prior to treatment  T: Treatment was performed at a clinically appropriate time  ?: Patient was allowed to ask questions and address concerns prior to treatment  See Adult Hemodialysis flowsheet in EPIC for further details and post assessment.  Machine water alarm in place and functioning. Transducer pods intact and checked every 15min.   Pt returned via bed.  Chlorine/Chloramine water system checked every 4 hours.  Outpatient Dialysis at *not established    Patient repositioned every 2 hours during the treatment.  Post treatment report given to Lisa Aldridge RN regarding 0.7L of fluid removed, last BP of 127/81, and patient pain rating of 0/10.

## 2023-03-03 NOTE — PROGRESS NOTES
Video Fluoroscopic Swallow Study (VFSS):    Mild oropharyngeal dysphagia; pt making progress.    Continue regular solids, continue mildly thick liquids (IDDSI 7, 2) - small/single sips, straws ok if one sip at a time, complete upright position, slow rate of intake, liquid wash PRN.     OK for free water protocol: THIN water, between meals (30 mins before or after), after oral hygiene, when fully upright, small single sips, NO straws, periodic cough and re-swallow.       03/03/23 4636   Appointment Info   Signing Clinician's Name / Credentials (SLP) Debbie Ron MS CCC-SLP   Type of Evaluation   Type of Evaluation Swallow Evaluation   VFSS Evaluation   Radiologist    Views Taken left lateral   Physical Location of Procedure [facility], Radiology Dept, Fluoroscopy Suite   VFSS Textures Trialed thin liquids;pureed;solid foods   VFSS Eval: Thin Liquid Texture Trial   Mode of Presentation, Thin Liquid cup;straw;self-fed   Order of Presentation 25, 26, 27 (consecutive sips, 3 in a row), 28 (straw), 31 (cup liquid wash), 32   Preparatory Phase poor bolus control   Oral Phase, Thin Liquid premature pharyngeal entry   Bolus Location When Swallow Triggered pyriforms   Pharyngeal Phase, Thin Liquid impaired tongue base retraction;residue in pyriform sinus;impaired pharyngoesophageal segment opening  (trace residuals)   Rosenbek's Penetration Aspiration Scale: Thin Liquid Trial Results 5 - contrast contacts vocal cords, visible residue remains (penetration)   Diagnostic Statement Pt with flash laryngeal penetration with cup sips in isolation. Deep laryngeal penetration to the cords with straw sip and as a liquid wash after solids (d/t increased spillage).   VFSS Evaluation: Puree Solid Texture Trial   Mode of Presentation, Puree spoon   Order of Presentation 29   Preparatory Phase WFL   Oral Phase, Puree WFL   Bolus Location When Swallow Triggered valleculae   Pharyngeal Phase, Puree impaired tongue  base retraction;residue in vallecula;residue in pyriform sinus   Rosenbek's Penetration Aspiration Scale: Puree Food Trial Results 1 - no aspiration, contrast does not enter airway   Diagnostic Statement piecmeal swallow, with 2 swallows trace-min residue   VFSS Evaluation: Solid Food Texture Trial   Mode of Presentation, Solid spoon;self-fed   Order of Presentation 30   Preparatory Phase WFL   Oral Phase, Solid WFL   Bolus Location When Swallow Triggered valleculae   Pharyngeal Phase, Solid impaired tongue base retraction;residue in pyriform sinus;impaired pharyngoesophageal segment opening   Rosenbek's Penetration Aspiration Scale: Solid Food Trial Results 1 - no aspiration, contrast does not enter airway   Diagnostic Statement piecmeal swallow, with 2 swallows trace-min residue   Swallowing Recommendations   Diet Consistency Recommendations regular diet;mildly thick liquids (level 2)  (with free water protocol)   Supervision Level for Intake close supervision needed   Mode of Delivery Recommendations bolus size, small;no straws;slow rate of intake   Swallowing Maneuver Recommendations alternate food and liquid intake;supraglottic swallow  (PRN)   Monitoring/Assistance Required (Eating/Swallowing) monitor for cough or change in vocal quality with intake   Recommended Feeding/Eating Techniques (Swallow Eval) maintain upright sitting position for eating;maintain upright posture during/after eating for 30 minutes;minimize distractions during oral intake;moisten oral mucosa prior to intake;provide oral hygiene prior to intake   Medication Administration Recommendations, Swallowing (SLP) whole as tolerated   Clinical Impression   Criteria for Skilled Therapeutic Interventions Met (SLP Eval) Yes, treatment indicated   SLP Diagnosis Mild oropharyngeal dysphagia   Risks & Benefits of therapy have been explained evaluation/treatment results reviewed;care plan/treatment goals reviewed;risks/benefits  reviewed;current/potential barriers reviewed;participants voiced agreement with care plan;participants included;patient;spouse/significant other   Clinical Impression Comments   Video fluoroscopic swallow study completed, with emphasis on thin liquid trials for advancements. Pt assessed with thin liquids, puree solids, regular solids and currently presents with mild oropharyngeal dysphagia. Mastication, oral propulsion, oral clearance are functional. Mild reduced base of tongue retraction. Piecemeal swallows with solids. Reduced oral control specifically with thin liquids resulting in premature bolus spillage the pyriform sinuses; timely swallow initiation at the valleculae with solids. Hyolaryngeal elveation/excursion and epiglottic inversion appear WFL. Reduced duration of pharyngoeosphageal segment relaxation during the swallow.     Residuals: trace (liquids) to mild (solid) vallecular and pyriform sinus residuals 2/2 reduced BOTR, PES function. IND piecemeal/double swallows with solids effective.     Laryngeal penetration/aspiration: trace before/during laryngeal penetration (NO aspiration) occuring with thin liquids. Pt with no penetration to flash laryngeal penetration (functional) with cup sips in isolation. Deep laryngeal penetration to the cords with straw sip and as a liquid wash after solids (d/t increased spillage). Pt appears to have a strong cough which clears most residuals, ? very trace glottic residuals remain but difficult to tell.     SLP Total Evaluation Time   Evaluation, videofluoroscopic eval of swallow function Minutes (07147) 13   SLP Goals   Therapy Frequency (SLP Eval) 5 times/wk   SLP Predicted Duration/Target Date for Goal Attainment 03/10/23   SLP: Safely tolerate diet without signs/symptoms of aspiration Regular diet;Thin liquids;With use of swallow precautions;With assistance/supervision   Swallowing Dysfunction &/or Oral Function for Feeding   Treatment of Swallowing Dysfunction &/or  Oral Function for Feeding Minutes (81972) 12   Symptoms Noted During/After Treatment None   Treatment Detail/Skilled Intervention Educated pt and wife on results of VFSS, including cine clip loop review wife. Educated on current status, PO recommendations, trained in FWP with swallow strategies.   SLP Discharge Planning   SLP Plan FWP tolerance, Advancements to thin as appropriate   SLP Discharge Recommendation Transitional Care Facility   SLP Rationale for DC Rec Swallow and cognitive function well below baseline requiring ongoing ST. Pt making steady progress in swallow function   SLP Brief overview of current status  Continue regular solids, continue mildly thick liquids (IDDSI 7, 2) - small/single sips, straws ok if one sip at a time, complete upright position, slow rate of intake, liquid wash PRN. OK for free water protocol: THIN water, between meals (30 mins before or after), after oral hygiene, when fully upright, small single sips, NO straws, periodic cough and re-swallow.  Defer nutrition/PEG management to RD and MD   Total Session Time   Total Session Time (sum of timed and untimed services) 85

## 2023-03-03 NOTE — PLAN OF CARE
Goal Outcome Evaluation:    DATE & TIME: 3/2-3/3 7553-6858  Cognitive Concerns/ Orientation: A&O x2-3. Forgetful. Can be irritable at times. Slept most of the night  BEHAVIOR & AGGRESSION TOOL COLOR: Green   ABNL VS/O2: VSS on RA  MOBILITY: Strong Ax2 with mark steady. Repositions self frequently in bed  PAIN MANAGMENT: Denies pain  DIET: Regular diet w mildly thick liquids (level 2); continue to refuse TID TF boluses.   BOWEL/BLADDER: Continent of bowel  DRAIN/DEVICES: Refusing new PIV, MD aware. Rt external jugular tunneled CVC for hemodialysis. Double lumen PEG tube, clamped.   SKIN: Jaundiced, Scattered scabs and bruising. Blanchable redness to coccyx. Light redness left elbow, Mepilex in place.  TEST/PROCEDURES: HD today 3/3  D/C DATE: Pending TCU placement   OTHER IMPORTANT INFO:

## 2023-03-04 ENCOUNTER — APPOINTMENT (OUTPATIENT)
Dept: GENERAL RADIOLOGY | Facility: CLINIC | Age: 58
DRG: 981 | End: 2023-03-04
Attending: INTERNAL MEDICINE
Payer: COMMERCIAL

## 2023-03-04 ENCOUNTER — APPOINTMENT (OUTPATIENT)
Dept: SPEECH THERAPY | Facility: CLINIC | Age: 58
DRG: 981 | End: 2023-03-04
Attending: STUDENT IN AN ORGANIZED HEALTH CARE EDUCATION/TRAINING PROGRAM
Payer: COMMERCIAL

## 2023-03-04 LAB
ANION GAP SERPL CALCULATED.3IONS-SCNC: 12 MMOL/L (ref 7–15)
BUN SERPL-MCNC: 41.2 MG/DL (ref 6–20)
CALCIUM SERPL-MCNC: 10.3 MG/DL (ref 8.6–10)
CHLORIDE SERPL-SCNC: 97 MMOL/L (ref 98–107)
CREAT SERPL-MCNC: 3.36 MG/DL (ref 0.67–1.17)
DEPRECATED HCO3 PLAS-SCNC: 26 MMOL/L (ref 22–29)
ERYTHROCYTE [DISTWIDTH] IN BLOOD BY AUTOMATED COUNT: 15.9 % (ref 10–15)
GFR SERPL CREATININE-BSD FRML MDRD: 20 ML/MIN/1.73M2
GLUCOSE SERPL-MCNC: 95 MG/DL (ref 70–99)
HCT VFR BLD AUTO: 33.8 % (ref 40–53)
HGB BLD-MCNC: 9.5 G/DL (ref 13.3–17.7)
MAGNESIUM SERPL-MCNC: 1.8 MG/DL (ref 1.7–2.3)
MCH RBC QN AUTO: 29.7 PG (ref 26.5–33)
MCHC RBC AUTO-ENTMCNC: 28.1 G/DL (ref 31.5–36.5)
MCV RBC AUTO: 106 FL (ref 78–100)
PLATELET # BLD AUTO: 138 10E3/UL (ref 150–450)
POTASSIUM SERPL-SCNC: 5 MMOL/L (ref 3.4–5.3)
RBC # BLD AUTO: 3.2 10E6/UL (ref 4.4–5.9)
SODIUM SERPL-SCNC: 135 MMOL/L (ref 136–145)
TACROLIMUS BLD-MCNC: 5.6 UG/L (ref 5–15)
TME LAST DOSE: NORMAL H
TME LAST DOSE: NORMAL H
WBC # BLD AUTO: 5.4 10E3/UL (ref 4–11)

## 2023-03-04 PROCEDURE — 250N000013 HC RX MED GY IP 250 OP 250 PS 637: Performed by: INTERNAL MEDICINE

## 2023-03-04 PROCEDURE — 71046 X-RAY EXAM CHEST 2 VIEWS: CPT

## 2023-03-04 PROCEDURE — 36415 COLL VENOUS BLD VENIPUNCTURE: CPT | Performed by: INTERNAL MEDICINE

## 2023-03-04 PROCEDURE — 250N000012 HC RX MED GY IP 250 OP 636 PS 637: Performed by: STUDENT IN AN ORGANIZED HEALTH CARE EDUCATION/TRAINING PROGRAM

## 2023-03-04 PROCEDURE — 250N000013 HC RX MED GY IP 250 OP 250 PS 637: Performed by: HOSPITALIST

## 2023-03-04 PROCEDURE — 250N000013 HC RX MED GY IP 250 OP 250 PS 637

## 2023-03-04 PROCEDURE — 99232 SBSQ HOSP IP/OBS MODERATE 35: CPT | Performed by: INTERNAL MEDICINE

## 2023-03-04 PROCEDURE — 85027 COMPLETE CBC AUTOMATED: CPT | Performed by: INTERNAL MEDICINE

## 2023-03-04 PROCEDURE — 82374 ASSAY BLOOD CARBON DIOXIDE: CPT | Performed by: INTERNAL MEDICINE

## 2023-03-04 PROCEDURE — 250N000013 HC RX MED GY IP 250 OP 250 PS 637: Performed by: STUDENT IN AN ORGANIZED HEALTH CARE EDUCATION/TRAINING PROGRAM

## 2023-03-04 PROCEDURE — 80197 ASSAY OF TACROLIMUS: CPT | Performed by: INTERNAL MEDICINE

## 2023-03-04 PROCEDURE — 83735 ASSAY OF MAGNESIUM: CPT | Performed by: INTERNAL MEDICINE

## 2023-03-04 PROCEDURE — 120N000001 HC R&B MED SURG/OB

## 2023-03-04 PROCEDURE — 92526 ORAL FUNCTION THERAPY: CPT | Mod: GN

## 2023-03-04 RX ADMIN — LACTULOSE 10 G: 10 SOLUTION ORAL at 09:09

## 2023-03-04 RX ADMIN — GUAIFENESIN 600 MG: 600 TABLET, EXTENDED RELEASE ORAL at 09:09

## 2023-03-04 RX ADMIN — TACROLIMUS 1 MG: 1 CAPSULE ORAL at 20:36

## 2023-03-04 RX ADMIN — PANTOPRAZOLE SODIUM 40 MG: 40 TABLET, DELAYED RELEASE ORAL at 09:09

## 2023-03-04 RX ADMIN — LACOSAMIDE 100 MG: 100 TABLET, FILM COATED ORAL at 12:47

## 2023-03-04 RX ADMIN — Medication 1 CAPSULE: at 09:08

## 2023-03-04 RX ADMIN — RIFAXIMIN 550 MG: 550 TABLET ORAL at 09:08

## 2023-03-04 RX ADMIN — OLANZAPINE 5 MG: 5 TABLET, ORALLY DISINTEGRATING ORAL at 23:08

## 2023-03-04 RX ADMIN — CALCIUM ACETATE 667 MG: 667 CAPSULE ORAL at 09:09

## 2023-03-04 RX ADMIN — APIXABAN 5 MG: 5 TABLET, FILM COATED ORAL at 20:36

## 2023-03-04 RX ADMIN — LEVETIRACETAM 1000 MG: 500 TABLET, FILM COATED ORAL at 23:08

## 2023-03-04 RX ADMIN — LACOSAMIDE 100 MG: 100 TABLET, FILM COATED ORAL at 01:38

## 2023-03-04 RX ADMIN — TACROLIMUS 1 MG: 1 CAPSULE ORAL at 09:08

## 2023-03-04 RX ADMIN — Medication 50 MCG: at 09:08

## 2023-03-04 RX ADMIN — OLANZAPINE 5 MG: 5 TABLET, ORALLY DISINTEGRATING ORAL at 20:36

## 2023-03-04 RX ADMIN — GUAIFENESIN 600 MG: 600 TABLET, EXTENDED RELEASE ORAL at 20:35

## 2023-03-04 RX ADMIN — OLANZAPINE 5 MG: 5 TABLET, ORALLY DISINTEGRATING ORAL at 09:09

## 2023-03-04 RX ADMIN — FOLIC ACID 1 MG: 1 TABLET ORAL at 09:09

## 2023-03-04 RX ADMIN — LACOSAMIDE 100 MG: 100 TABLET, FILM COATED ORAL at 23:08

## 2023-03-04 RX ADMIN — RIFAXIMIN 550 MG: 550 TABLET ORAL at 20:36

## 2023-03-04 RX ADMIN — RAMELTEON 8 MG: 8 TABLET, FILM COATED ORAL at 20:35

## 2023-03-04 RX ADMIN — APIXABAN 5 MG: 5 TABLET, FILM COATED ORAL at 09:08

## 2023-03-04 RX ADMIN — MIDODRINE HYDROCHLORIDE 10 MG: 5 TABLET ORAL at 12:47

## 2023-03-04 RX ADMIN — CALCIUM ACETATE 667 MG: 667 CAPSULE ORAL at 12:47

## 2023-03-04 RX ADMIN — MIDODRINE HYDROCHLORIDE 10 MG: 5 TABLET ORAL at 09:08

## 2023-03-04 ASSESSMENT — ACTIVITIES OF DAILY LIVING (ADL)
ADLS_ACUITY_SCORE: 56
ADLS_ACUITY_SCORE: 62
ADLS_ACUITY_SCORE: 56
ADLS_ACUITY_SCORE: 62
ADLS_ACUITY_SCORE: 56
ADLS_ACUITY_SCORE: 58
ADLS_ACUITY_SCORE: 62
ADLS_ACUITY_SCORE: 56

## 2023-03-04 NOTE — PLAN OF CARE
Goal Outcome Evaluation:       DATE & TIME: 3/3/23-3/4/23 3116-7230  Cognitive Concerns/ Orientation: A&O x2-3. Forgetful.    BEHAVIOR & AGGRESSION TOOL COLOR: Green , tends to argue/disagree about cares or treatment  ABNL VS/O2: VSS on RA  MOBILITY: Strong Ax2 with mark steady. Repositions self frequently in bed  PAIN MANAGMENT: Denies pain  DIET: Regular diet w mildly thick liquids (level 2) with meds & meals; free thin water between meal, TID bolus TF when < 50% meals eaten.   BOWEL/BLADDER:   DRAIN/DEVICES: No PIV access, MD aware per previous nurse. Rt external jugular tunneled CVC for hemodialysis. Double lumen PEG tube, clamped.   SKIN: Jaundiced, Scattered scabs and bruising. Blanchable redness to coccyx. Light redness left elbow, Mepilex in place.  TEST/PROCEDURES: HD and video swallow completed 3/3  D/C DATE: Pending TCU placement   OTHER IMPORTANT INFO:

## 2023-03-04 NOTE — PLAN OF CARE
Goal Outcome Evaluation:       DATE & TIME: 3/3 0017-4835  Cognitive Concerns/ Orientation: A&O x2-3. Forgetful.  Irritable/ emotionally labile.   BEHAVIOR & AGGRESSION TOOL COLOR: Green   ABNL VS/O2: VSS on RA  MOBILITY: Strong Ax2 with mark steady. Repositions self frequently in bed  PAIN MANAGMENT: Denies pain  DIET: Regular diet w mildly thick liquids (level 2)-free water protocol also in effect; improved appetite, continue to refuse TID TF boluses.   BOWEL/BLADDER: incontinent of bowel at times   DRAIN/DEVICES: Refusing new PIV, MD aware. Rt external jugular tunneled CVC for hemodialysis. Double lumen PEG tube, clamped.   SKIN: Jaundiced, Scattered scabs and bruising. Blanchable redness to coccyx. Light redness left elbow, Mepilex in place.  TEST/PROCEDURES: HD and video swallow completed today 3/3  D/C DATE: Pending TCU placement   OTHER IMPORTANT INFO:

## 2023-03-04 NOTE — PROGRESS NOTES
[facility]    Medicine Progress Note - Hospitalist Service    Date of Admission:  1/21/2023    Assessment & Plan   Blanco Osborne is a 57 year-old male admitted on 1/21/2023 as a transfer from [facility] for evaluation of loculated pleural effusion. He has extensive PMH including h/o liver transplant 2016 due to alcohol abuse, pAF on chronic anticoagulation, history of diabetes mellitus type 2, CVA, hypertension, CHRISTIAN on BiPAP.  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 He was recovering in [facility] and was transferred to [facility] for consideration of chest tube placement and possible intrapleural lysis for worsening effusion.     Acute metabolic encephalopathy with delirium, multifactorial  Hepatic encephalopathy  Chronic liver disease, history of liver transplant 2016  End-stage renal disease (ESRD), on hemodialysis (HD)  History of seizure, on Keppra and Vimpat  Waxing and waning mentation, coinciding with lactulose being held at [facility]  Earlier in hospital stay, remained confused and had pulled out tracheostomy tube, PICC line and pulled his dialysis catheter.  Lines replaced.  Palliative care consult 1/26, see note.  * Psychiatry consulted 2/2, see note, follow-up requested.    On/off fluctuating mentation, recently improving     Continue to reorient as needed; maintain normal day/night, sleep wake cycles; minimize sedating/altering medications as able    Continue Lactulose to 10 mg BID, monitor for symptoms to have 2-3 Bms    Zyprexa 5 mg at bedtime scheduled.  Melatonin 3 mg p.o. at bedtime scheduled    Olanzapine BID as needed for severe agitation or anxiety; monitor for side effets    Mittens/sitter as needed; minimize use of restraints as able    Ongoing hemodialysis, as  per nephrology, on M-W-F dialysis schedule; ongoing nephrology consult follow-up appreciated    2/20/23 Off restraints.Mentation has improved    2/21/23 -Fall this morningWorsening.Delirium. Ramelteon started.Psyciatry consult appreciated  2/22/23 Improved mentation     2/28: Appreciate psychiatry evaluation: Decreased Zyprexa to 5 mg twice daily and continuing 5 mg twice daily as needed.  Also continue Ramelteon 8 mg nightly    3/3: BETTER    Bilateral pleural effusions   Acute respiratory failure requiring tracheostomy  - resolved    Pneumonia  - resolved   ARDS  - resolved    Right pneumothorax - resolved   *Initial event was parainfluenza and strep pneumo pneumonia back in November 2022. Treated for ARDS. Had failed extubation x2 and tracheostomy performed on previous hospitalization. *Had additional complications of bilateral pneumothoraces as well as hydropneumothorax- pleural fluid grew candida parapsilosis  *Completed 4-week antifungal treatment. While in LTACH he was successfully weaned from the ventilator.  *Recently there were concern for worsening effusion while at [facility] and had thoracentesis 01/13 which returned exudative without growth on cultures. CT chest/abdomen/pelvis on 01/18 demonstrated worsening effusions and concerns for infected parapneumonic effusions with possible SBP.  Multiple pulmonologist at [facility] reviewed CT scan recommended transfer to [facility] for consideration of chest tube placement and possible intrapleural lysis  *Thoracic surgery (Dr. Jackson) consulted during admission   *CT chest 1/22, small effusions on imaging and so chest tube was not inserted.   ID consulted, see note 2/10.  *Patient pulled out his tracheostomy tube on the night 2/1-2/2 and is tolerating well without increased shortness of breath persistent cough or worsening hypoxia  * Chest x-ray 2/3 with cardiomegaly, chronic small bilateral pleural effusions, no pulmonary edema; right internal jugular dialysis  "catheter in place    Monitor respiratory status, recently stable on room air    Encourage incentive spirometry as able    Wound care previously consulted for tracheostomy site care, monitor for signs and symptoms of infection      Discontinued nystatin 3/01    3/4: We will get chest x-ray    S/p liver transplant 2016   Chronic immunosuppression, on tacrolimus  Cirrhosis with ascites  Subacute bacterial peritonitis (SBP), resolved  *Main manifestations of this are hepatic encephalopathy and ascites.  Hepatologist at [place] felt that most likely reason for the cirrhosis was metabolic syndrome or alcohol intake.  There was no evidence of rejection on biopsy and there was some evidence of regeneration. Paracentesis done on 12/22/2022 showed lactobacillus indicating SBP, treated with Unasyn and Augmentin, finished 2-week course 1/12/2023.  Requires large-volume paracentesis periodically for recurring ascites.    *Paracentesis 1/13/2023 yielded about 7500 cc. Cultures NGTD.  Most recent paracentesis on 1/24 with 4.8 L fluid removal    Continue intermittent paracentesis as needed if develops symptomatic ascites    Monitor for signs and symptoms of recurrent spontaneous bacterial peritonitis     Further treatment for recurrent ascites with TIPS deferred to his Liver Transplant team and/or Hepatology, he has an appointment on 4/21/2023 with Hepatology, Dr. Simms    Continue Tacrolimus 1 mg BID.    Continue rifaximin 550 mg BID    Continue lactulose as ordered, titrate as needed to have 2-3 bms    GI consult as needed in hospital for new acute issues, otherwise as outpatient     Goals of care   As per prior rounding provider, \"on 1/25 nursing had mentioned that patient had refused to undergo dialysis and want to stop care, palliative care was consulted.  Patient and his spouse denied wanting to stop care and wanted ongoing restorative care including full code\"    Monitor, Full Code status    -Needs placement to " TCU     Diarrhea, improved, on lactulose for chronic liver disease  - C difficile negative on 1/31. Secondary to lactulose.  - discontinued rectal tube (2/12) as stools more formed    Continue lactulose with goal of 2 to 3 soft bowel movement in 24 hours     Paroxysmal atrial fibrillation  Chronic anticoagulation, apixaban  Remains in sinus rhythm, on anticoagulation with apixaban indefinitely for stroke prophylaxis.      Monitor rhythm    Continue apixaban anticoagulation    Monitor hemoglobin, see below     Acute anemia  Pt has required intermittent transfusions for on-going anemia.  Anemia most likely secondary to critical illness/chronic disease, chronic renal disease.  Baseline hemoglobin around 7-8  Likely Epo resistance    Transfuse packed red blood cells to keep hemoglobin > 7    Epoetin lfa-epbx use as per nephrology     History PEA arrest   PEA arrest prior to his previous admission and 1 episode of PEA associated with desaturation on 12/20.  No structural cardiac disease.     Stable, continue cardiac Monitoring     Chronic Hypotension  In the past has developed baseline hypotension with cirrhosis and prolonged critical illness.  On hemodialysis.  Receiving midodrine and albumin during dailysis    Continue midodrine, as per nephrology      History of seizure   After hypoxic event, in the context of baseline multifactorial encephalopathy secondary to his ongoing critical illness and prior cardiac arrests and prior strokes and cirrhosis with hepatic encephalopathy. MRI of head of 12/20/22 reveals no PRES or leptomeningeal enhancement but scattered.  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   No recent seizure activity.    Continue levetiracetam, lacosamide     Seizure precautions    Outpatient follow-up with neurology, consult inpatient if needed     # ESRD  # Anemia due to renal disease  # Hypotension during dialysis  -Underwent dialysis  on 2/22/23 and  was hypotensive and received midodrine and albumin  Receiving albumin with dialysis       Diabetes mellitus type 2  *Hemoglobin A1c on November 2022 was 4.7  *By report, on Lantus insulin in the past.  Blood sugar checks and sliding scale insulin previously discontinued as has been stable without insulin needs    Monitor with periodic blood sugar checks as needed     Severe malnutrition, protein and calorie type  Moderate dysphagia  G-tube feedings    Continue with tube feeds via PEG tube    IR replaced the PEG tube on 2/8/2023, monitor    Nutritionist consulted and following for tube feeds    SLP following as well. Oral diet as per speech and language therapy (SLP)     2/20 Nutrition following for tube feeds    Underwent video swallow on 2/23/22     3/1: Change PPI to daily as per family's request    Anxiety  Fluoxetine was discontinued as per psychiatry recommendation on 2/2 (see consult note for details)     Stable, monitor; comfort and reassurance offered during visit    Psychiatry follow-up       Diet: Room Service  Snacks/Supplements Adult: Other; Gelatein+ with brkfst and lunch, and magic cup with dinner (RD); With Meals  Adult Formula Bolus Feeding: Novasource Renal; Route: Gastrostomy; 3 times daily; Volume per Bolus: 240; mL(s); Continue to encourage after meal bolus at 80 mL/hr x 3 hrs, If patient consumes <50% of that meal  Combination Diet Regular Diet; Mildly Thick (level 2) (OK for fresh fruit (e.g., pineapple) per SLP)    DVT Prophylaxis: DOAC  Nixon Catheter: Not present  Lines: PRESENT      CVC Double Lumen Right External jugular Tunneled-Site Assessment: WDL      Cardiac Monitoring: None  Code Status: Full Code      Clinically Significant Risk Factors              # Hypoalbuminemia: Lowest albumin = 1.9 g/dL at 1/23/2023  6:38 AM, will monitor as appropriate            # Severe Malnutrition: based on nutrition assessment        Disposition Plan      Expected Discharge Date: 03/06/2023, 12:00  PM  Discharge Delays: Placement - LTC  Destination: inpatient rehabilitation facility  Discharge Comments: admitted on 1-21 from [facility], Dialysis pt MWF. Will need placement        Pako Tan MD  Hospitalist Service  [facility]  Securely message with [employer] (more info)  Text page via [employer] Paging/Directory   ______________________________________________________________________    Interval History   Complains of cough  Had some increased confusion last night as per RN but resolved this morning    Denies any fever/chills    Physical Exam   /66 (BP Location: Left arm)   Pulse 97   Temp 97.7  F (36.5  C) (Oral)   Resp 18   Wt 84.2 kg (185 lb 10 oz)   SpO2 96%   BMI 25.18 kg/m    Gen- pleasant   Throat: no redness/ pus pointer/ candida  Neck- supple  CVS- I+II+ ESM  RS- CTAB, decreased at base with few crackles  Abdo- soft, no tenderness. No g/r/r   Ext-  edema     Medical Decision Making       40 MINUTES SPENT BY ME on the date of service doing chart review, history, exam, documentation & further activities per the note.      Data   ------------------------- PAST 24 HR DATA REVIEWED -----------------------------------------------        Imaging results reviewed over the past 24 hrs:   Recent Results (from the past 24 hour(s))   XR Video Swallow with SLP or OT    Narrative    VIDEO SWALLOWING EVALUATION   3/3/2023 2:21 PM     HISTORY: reassess thin liquids    COMPARISON: 2/23/2023 swallow study.    FLUOROSCOPY TIME: 1.2 minutes     Number of cine runs: 8    FINDINGS: Thin liquid consistency demonstrated an episode of flash  penetration and an episode of deep penetration that cleared with  subsequent cough. No serge aspiration on thin liquid consistency.  Residual in the vallecula is seen with thin liquid consistency on each  swallow. Puree consistency taken with teaspoon and with regular food  demonstrates residual without penetration or aspiration.    This study only  includes the cervical esophagus. Please see the speech  pathologist report for further details.    DARREN EVANS MD         SYSTEM ID:  [id number]

## 2023-03-05 LAB
ALBUMIN SERPL BCG-MCNC: 2.6 G/DL (ref 3.5–5.2)
ALLEN'S TEST: YES
ALP SERPL-CCNC: 227 U/L (ref 40–129)
ALT SERPL W P-5'-P-CCNC: <5 U/L (ref 10–50)
AMMONIA PLAS-SCNC: 54 UMOL/L (ref 16–60)
ANION GAP SERPL CALCULATED.3IONS-SCNC: 15 MMOL/L (ref 7–15)
AST SERPL W P-5'-P-CCNC: 17 U/L (ref 10–50)
BASE EXCESS BLDA CALC-SCNC: 0.7 MMOL/L (ref -9–1.8)
BASE EXCESS BLDV CALC-SCNC: 0.9 MMOL/L (ref -7.7–1.9)
BILIRUB SERPL-MCNC: 0.4 MG/DL
BUN SERPL-MCNC: 52.2 MG/DL (ref 6–20)
CALCIUM SERPL-MCNC: 11 MG/DL (ref 8.6–10)
CHLORIDE SERPL-SCNC: 100 MMOL/L (ref 98–107)
CREAT SERPL-MCNC: 4.32 MG/DL (ref 0.67–1.17)
DEPRECATED HCO3 PLAS-SCNC: 24 MMOL/L (ref 22–29)
ERYTHROCYTE [DISTWIDTH] IN BLOOD BY AUTOMATED COUNT: 15.8 % (ref 10–15)
GFR SERPL CREATININE-BSD FRML MDRD: 15 ML/MIN/1.73M2
GLUCOSE SERPL-MCNC: 108 MG/DL (ref 70–99)
HCO3 BLD-SCNC: 28 MMOL/L (ref 21–28)
HCO3 BLDV-SCNC: 30 MMOL/L (ref 21–28)
HCT VFR BLD AUTO: 32.8 % (ref 40–53)
HGB BLD-MCNC: 9.3 G/DL (ref 13.3–17.7)
MAGNESIUM SERPL-MCNC: 2 MG/DL (ref 1.7–2.3)
MCH RBC QN AUTO: 29.9 PG (ref 26.5–33)
MCHC RBC AUTO-ENTMCNC: 28.4 G/DL (ref 31.5–36.5)
MCV RBC AUTO: 106 FL (ref 78–100)
O2/TOTAL GAS SETTING VFR VENT: 30 %
O2/TOTAL GAS SETTING VFR VENT: 94 %
OXYHGB MFR BLD: 94 % (ref 92–100)
PCO2 BLD: 57 MM HG (ref 35–45)
PCO2 BLDV: 70 MM HG (ref 40–50)
PH BLD: 7.3 [PH] (ref 7.35–7.45)
PH BLDV: 7.24 [PH] (ref 7.32–7.43)
PLATELET # BLD AUTO: 108 10E3/UL (ref 150–450)
PO2 BLD: 94 MM HG (ref 80–105)
PO2 BLDV: 38 MM HG (ref 25–47)
POTASSIUM SERPL-SCNC: 5.1 MMOL/L (ref 3.4–5.3)
PROT SERPL-MCNC: 7 G/DL (ref 6.4–8.3)
RBC # BLD AUTO: 3.11 10E6/UL (ref 4.4–5.9)
SODIUM SERPL-SCNC: 139 MMOL/L (ref 136–145)
WBC # BLD AUTO: 4.4 10E3/UL (ref 4–11)

## 2023-03-05 PROCEDURE — 250N000013 HC RX MED GY IP 250 OP 250 PS 637: Performed by: INTERNAL MEDICINE

## 2023-03-05 PROCEDURE — 83735 ASSAY OF MAGNESIUM: CPT | Performed by: INTERNAL MEDICINE

## 2023-03-05 PROCEDURE — 99232 SBSQ HOSP IP/OBS MODERATE 35: CPT | Performed by: INTERNAL MEDICINE

## 2023-03-05 PROCEDURE — 250N000013 HC RX MED GY IP 250 OP 250 PS 637

## 2023-03-05 PROCEDURE — 250N000013 HC RX MED GY IP 250 OP 250 PS 637: Performed by: STUDENT IN AN ORGANIZED HEALTH CARE EDUCATION/TRAINING PROGRAM

## 2023-03-05 PROCEDURE — 85027 COMPLETE CBC AUTOMATED: CPT | Performed by: INTERNAL MEDICINE

## 2023-03-05 PROCEDURE — 999N000157 HC STATISTIC RCP TIME EA 10 MIN

## 2023-03-05 PROCEDURE — 82805 BLOOD GASES W/O2 SATURATION: CPT | Performed by: INTERNAL MEDICINE

## 2023-03-05 PROCEDURE — 120N000001 HC R&B MED SURG/OB

## 2023-03-05 PROCEDURE — 250N000013 HC RX MED GY IP 250 OP 250 PS 637: Performed by: HOSPITALIST

## 2023-03-05 PROCEDURE — 36415 COLL VENOUS BLD VENIPUNCTURE: CPT | Performed by: INTERNAL MEDICINE

## 2023-03-05 PROCEDURE — 94660 CPAP INITIATION&MGMT: CPT

## 2023-03-05 PROCEDURE — 82803 BLOOD GASES ANY COMBINATION: CPT | Performed by: INTERNAL MEDICINE

## 2023-03-05 PROCEDURE — 250N000009 HC RX 250: Performed by: STUDENT IN AN ORGANIZED HEALTH CARE EDUCATION/TRAINING PROGRAM

## 2023-03-05 PROCEDURE — 80053 COMPREHEN METABOLIC PANEL: CPT | Performed by: INTERNAL MEDICINE

## 2023-03-05 PROCEDURE — 250N000012 HC RX MED GY IP 250 OP 636 PS 637: Performed by: STUDENT IN AN ORGANIZED HEALTH CARE EDUCATION/TRAINING PROGRAM

## 2023-03-05 PROCEDURE — 99233 SBSQ HOSP IP/OBS HIGH 50: CPT | Performed by: INTERNAL MEDICINE

## 2023-03-05 PROCEDURE — 82140 ASSAY OF AMMONIA: CPT | Performed by: INTERNAL MEDICINE

## 2023-03-05 RX ORDER — OLANZAPINE 5 MG/1
5 TABLET, ORALLY DISINTEGRATING ORAL ONCE
Status: COMPLETED | OUTPATIENT
Start: 2023-03-05 | End: 2023-03-05

## 2023-03-05 RX ORDER — CARBOXYMETHYLCELLULOSE SODIUM 5 MG/ML
1 SOLUTION/ DROPS OPHTHALMIC
Status: DISCONTINUED | OUTPATIENT
Start: 2023-03-05 | End: 2023-04-28 | Stop reason: HOSPADM

## 2023-03-05 RX ADMIN — GUAIFENESIN 600 MG: 600 TABLET, EXTENDED RELEASE ORAL at 08:48

## 2023-03-05 RX ADMIN — OLANZAPINE 5 MG: 5 TABLET, ORALLY DISINTEGRATING ORAL at 20:17

## 2023-03-05 RX ADMIN — APIXABAN 5 MG: 5 TABLET, FILM COATED ORAL at 08:48

## 2023-03-05 RX ADMIN — RIFAXIMIN 550 MG: 550 TABLET ORAL at 08:48

## 2023-03-05 RX ADMIN — ACETAMINOPHEN 650 MG: 325 TABLET, FILM COATED ORAL at 08:53

## 2023-03-05 RX ADMIN — GUAIFENESIN 600 MG: 600 TABLET, EXTENDED RELEASE ORAL at 20:17

## 2023-03-05 RX ADMIN — Medication 50 MCG: at 08:48

## 2023-03-05 RX ADMIN — RIFAXIMIN 550 MG: 550 TABLET ORAL at 20:17

## 2023-03-05 RX ADMIN — OLANZAPINE 5 MG: 5 TABLET, ORALLY DISINTEGRATING ORAL at 22:18

## 2023-03-05 RX ADMIN — LACTULOSE 10 G: 10 SOLUTION ORAL at 22:18

## 2023-03-05 RX ADMIN — LACOSAMIDE 100 MG: 100 TABLET, FILM COATED ORAL at 12:13

## 2023-03-05 RX ADMIN — FOLIC ACID 1 MG: 1 TABLET ORAL at 08:48

## 2023-03-05 RX ADMIN — MIDODRINE HYDROCHLORIDE 10 MG: 5 TABLET ORAL at 08:48

## 2023-03-05 RX ADMIN — APIXABAN 5 MG: 5 TABLET, FILM COATED ORAL at 20:17

## 2023-03-05 RX ADMIN — BENZONATATE 100 MG: 100 CAPSULE ORAL at 13:50

## 2023-03-05 RX ADMIN — Medication 1 CAPSULE: at 08:48

## 2023-03-05 RX ADMIN — RAMELTEON 8 MG: 8 TABLET, FILM COATED ORAL at 20:17

## 2023-03-05 RX ADMIN — PANTOPRAZOLE SODIUM 40 MG: 40 TABLET, DELAYED RELEASE ORAL at 08:48

## 2023-03-05 RX ADMIN — TACROLIMUS 1 MG: 1 CAPSULE ORAL at 08:48

## 2023-03-05 RX ADMIN — OLANZAPINE 5 MG: 5 TABLET, ORALLY DISINTEGRATING ORAL at 13:50

## 2023-03-05 RX ADMIN — CALCIUM ACETATE 667 MG: 667 CAPSULE ORAL at 12:13

## 2023-03-05 RX ADMIN — LEVETIRACETAM 1000 MG: 500 TABLET, FILM COATED ORAL at 22:18

## 2023-03-05 RX ADMIN — LACTULOSE 10 G: 10 SOLUTION ORAL at 08:49

## 2023-03-05 RX ADMIN — TACROLIMUS 1 MG: 5 CAPSULE ORAL at 22:54

## 2023-03-05 RX ADMIN — OLANZAPINE 5 MG: 5 TABLET, ORALLY DISINTEGRATING ORAL at 08:48

## 2023-03-05 RX ADMIN — CALCIUM ACETATE 667 MG: 667 CAPSULE ORAL at 08:48

## 2023-03-05 RX ADMIN — MIDODRINE HYDROCHLORIDE 10 MG: 5 TABLET ORAL at 12:14

## 2023-03-05 ASSESSMENT — ACTIVITIES OF DAILY LIVING (ADL)
ADLS_ACUITY_SCORE: 64
ADLS_ACUITY_SCORE: 58
ADLS_ACUITY_SCORE: 64
ADLS_ACUITY_SCORE: 64
ADLS_ACUITY_SCORE: 58
ADLS_ACUITY_SCORE: 64
ADLS_ACUITY_SCORE: 64
ADLS_ACUITY_SCORE: 58
ADLS_ACUITY_SCORE: 64

## 2023-03-05 NOTE — PROVIDER NOTIFICATION
MD Notification    Notified Person: MD    Notified Person Name:Dominick    Notification Date/Time: 3/5/23 @2608    Notification Interaction: vocera messeging    Purpose of Notification: blood gases results back - PCO2 70 PH 7.24     Orders Received: trial bipap- started by RT @ 7380  Comments:

## 2023-03-05 NOTE — PROGRESS NOTES
Dialysis port:    Patient partially pulled out his dialysis port away this AM. On call MD Dr Montaño notified and said to cover it, he is coming to look at it.

## 2023-03-05 NOTE — PROGRESS NOTES
Renal Medicine Progress Note            Assessment/Plan:     Assessment:  1) ESRD on MWF HD     CVC on exam benign in appearance, suture in place, cuff in tunnel tract palpable and visible in appropriate position. Do not think we even need an chest xray as it appears in position on exam. If patient does remove CVC, we need to think carefully before replacing as this behavior if not correctable implies he is not a longer term appropriate dialysis catheter.     2) Anemia: EPO with HD, last Hgb 9.5 on 3/4    Plan/Recs:  1) HD orders placed for tomorrow.   2) Keep CVC covered, protected as able  3) EPO with HD    Torsten Montaño DO  [facility]  Office: [phone]  Cell: [phone]        Interval History:      Pt seen as I was called earlier today with concerns about patient having pulled on his catheter and concerns over postioning. Pt remains confused on encounter today. No major events, last HD 3/3.            Medications and Allergies:       - MEDICATION INSTRUCTIONS for Dialysis Patients -   Does not apply See Admin Instructions     apixaban ANTICOAGULANT  5 mg Oral BID     calcium acetate (phos binder)  667 mg Oral TID w/meals     folic acid  1 mg Oral or Feeding Tube Daily     guaiFENesin  600 mg Oral BID     lacosamide  100 mg Oral Q12H     lactulose  10 g Oral BID     levETIRAcetam  1,000 mg Oral Q24H     lidocaine  1 patch Transdermal Q24H     lidocaine   Transdermal Q8H BIJU     midodrine  10 mg Oral or Feeding Tube TID w/meals     mineral oil-hydrophilic petrolatum   Topical Daily     multivitamin RENAL  1 capsule Oral Daily     - MEDICATION INSTRUCTIONS -   Does not apply Once     [Held by provider] nystatin  500,000 Units Swish & Spit 4x Daily     OLANZapine zydis  5 mg Oral BID     pantoprazole  40 mg Oral QAM AC     ramelteon  8 mg Oral QPM     rifaximin  550 mg Oral or Feeding Tube BID     sodium chloride (PF)  10-30 mL Intracatheter Q8H     sodium chloride (PF)  3 mL Intracatheter Q8H      sodium chloride (PF)  9 mL Intracatheter During Dialysis/CRRT (from stock)     sodium chloride (PF)  9 mL Intracatheter During Dialysis/CRRT (from stock)     sodium chloride (PF) 0.9%  10 mL Intracatheter Once in dialysis/CRRT     sodium chloride (PF) 0.9%  10 mL Intracatheter Once in dialysis/CRRT     tacrolimus  1 mg Oral Q12H     cholecalciferol  50 mcg Oral Daily      No Known Allergies         Physical Exam:   Vitals were reviewed  /80 (BP Location: Left arm)   Pulse 80   Temp 98.4  F (36.9  C) (Tympanic)   Resp 18   Wt 84.2 kg (185 lb 10 oz)   SpO2 90%   BMI 25.18 kg/m      Wt Readings from Last 3 Encounters:   02/27/23 84.2 kg (185 lb 10 oz)   01/21/23 82.4 kg (181 lb 11.2 oz)   12/28/22 96 kg (211 lb 10.3 oz)       Intake/Output Summary (Last 24 hours) at 3/5/2023 0813  Last data filed at 3/4/2023 0900  Gross per 24 hour   Intake 488 ml   Output --   Net 488 ml       GENERAL APPEARANCE: awake, confused  HEENT:  Eyes/ears/nose/neck grossly normal  ABDOMEN: soft  EXTREMITIES/SKIN: no c/c/rashes/lesions; 1-2+ edema  LINES:  Right CVC intact, suture in place. No drainage, erythema. Palpable cuff in tunnel tract noted.,            Data:     BMP  Recent Labs   Lab 03/04/23  1020 03/02/23  1002 02/27/23  1149   * 138 137   POTASSIUM 5.0 5.0 4.2   CHLORIDE 97* 97* 99   KELLY 10.3* 9.7 8.6   CO2 26 31* 29   BUN 41.2* 36.2* 21.6*   CR 3.36* 3.46* 2.49*   GLC 95 110* 82     CBC  Recent Labs   Lab 03/04/23  1821 03/02/23  1002 02/27/23  1149   WBC 5.4 3.8* 3.4*   HGB 9.5* 9.2* 9.3*   HCT 33.8* 32.2* 32.7*   * 105* 104*   * 126* 133*     Lab Results   Component Value Date    AST 17 03/02/2023    ALT 6 (L) 03/02/2023    ALKPHOS 182 (H) 03/02/2023    BILITOTAL 0.5 03/02/2023    CHANDLER 29 02/14/2023     Lab Results   Component Value Date    INR 1.36 (H) 12/21/2022       Attestation:  I have reviewed today's vital signs, notes, medications, labs and imaging.    DO Pranav Blood  [facility] - Nephrology  Office: [phone]  Cell: [phone]

## 2023-03-05 NOTE — PROGRESS NOTES
[facility]    Medicine Progress Note - Hospitalist Service    Date of Admission:  1/21/2023    Assessment & Plan   Blanco Osborne is a 57 year-old male admitted on 1/21/2023 as a transfer from [facility] for evaluation of loculated pleural effusion. He has extensive PMH including h/o liver transplant 2016 due to alcohol abuse, pAF on chronic anticoagulation, history of diabetes mellitus type 2, CVA, hypertension, CHRISTIAN on BiPAP.  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 He was recovering in [facility] and was transferred to [facility] for consideration of chest tube placement and possible intrapleural lysis for worsening effusion.     Acute metabolic encephalopathy with delirium, multifactorial  Hepatic encephalopathy  Chronic liver disease, history of liver transplant 2016  End-stage renal disease (ESRD), on hemodialysis (HD)  History of seizure, on Keppra and Vimpat  Waxing and waning mentation, coinciding with lactulose being held at [facility]  Earlier in hospital stay, remained confused and had pulled out tracheostomy tube, PICC line and pulled his dialysis catheter.  Lines replaced.  Palliative care consult 1/26, see note.  * Psychiatry consulted 2/2, see note, follow-up requested.    On/off fluctuating mentation, recently improving     Continue to reorient as needed; maintain normal day/night, sleep wake cycles; minimize sedating/altering medications as able    Continue Lactulose to 10 mg BID, monitor for symptoms to have 2-3 Bms    Zyprexa 5 mg at bedtime scheduled.  Melatonin 3 mg p.o. at bedtime scheduled    Olanzapine BID as needed for severe agitation or anxiety; monitor for side effets    Mittens/sitter as needed; minimize use of restraints as able    Ongoing hemodialysis, as  per nephrology, on M-W-F dialysis schedule; ongoing nephrology consult follow-up appreciated    2/20/23 Off restraints.Mentation has improved    2/21/23 -Fall this morningWorsening.Delirium. Ramelteon started.Psyciatry consult appreciated  2/22/23 Improved mentation     2/28: Appreciate psychiatry evaluation: Decreased Zyprexa to 5 mg twice daily and continuing 5 mg twice daily as needed.  Also continue Ramelteon 8 mg nightly    3/3: BETTER    Bilateral pleural effusions   Acute respiratory failure requiring tracheostomy  - resolved    Pneumonia  - resolved   ARDS  - resolved    Right pneumothorax - resolved   *Initial event was parainfluenza and strep pneumo pneumonia back in November 2022. Treated for ARDS. Had failed extubation x2 and tracheostomy performed on previous hospitalization. *Had additional complications of bilateral pneumothoraces as well as hydropneumothorax- pleural fluid grew candida parapsilosis  *Completed 4-week antifungal treatment. While in LTACH he was successfully weaned from the ventilator.  *Recently there were concern for worsening effusion while at [facility] and had thoracentesis 01/13 which returned exudative without growth on cultures. CT chest/abdomen/pelvis on 01/18 demonstrated worsening effusions and concerns for infected parapneumonic effusions with possible SBP.  Multiple pulmonologist at [facility] reviewed CT scan recommended transfer to [facility] for consideration of chest tube placement and possible intrapleural lysis  *Thoracic surgery (Dr. Jackson) consulted during admission   *CT chest 1/22, small effusions on imaging and so chest tube was not inserted.   ID consulted, see note 2/10.  *Patient pulled out his tracheostomy tube on the night 2/1-2/2 and is tolerating well without increased shortness of breath persistent cough or worsening hypoxia  * Chest x-ray 2/3 with cardiomegaly, chronic small bilateral pleural effusions, no pulmonary edema; right internal jugular dialysis  "catheter in place    Monitor respiratory status, recently stable on room air    Encourage incentive spirometry as able    Wound care previously consulted for tracheostomy site care, monitor for signs and symptoms of infection      Discontinued nystatin 3/01    3/4,3/5:  chest x-ray:Some increase in opacity at right base likely represents a combination of small right pleural effusion and atelectasis/infiltrate.  Normal WBC count of 5.4  -Encourage incentive spirometer and Acapella.  Monitor closely    S/p liver transplant 2016   Chronic immunosuppression, on tacrolimus  Cirrhosis with ascites  Subacute bacterial peritonitis (SBP), resolved  *Main manifestations of this are hepatic encephalopathy and ascites.  Hepatologist at [place] felt that most likely reason for the cirrhosis was metabolic syndrome or alcohol intake.  There was no evidence of rejection on biopsy and there was some evidence of regeneration. Paracentesis done on 12/22/2022 showed lactobacillus indicating SBP, treated with Unasyn and Augmentin, finished 2-week course 1/12/2023.  Requires large-volume paracentesis periodically for recurring ascites.    *Paracentesis 1/13/2023 yielded about 7500 cc. Cultures NGTD.  Most recent paracentesis on 1/24 with 4.8 L fluid removal    Continue intermittent paracentesis as needed if develops symptomatic ascites    Monitor for signs and symptoms of recurrent spontaneous bacterial peritonitis     Further treatment for recurrent ascites with TIPS deferred to his Liver Transplant team and/or Hepatology, he has an appointment on 4/21/2023 with Hepatology, Dr. Simms    Continue Tacrolimus 1 mg BID, rifaximin 550 mg BID    Continue lactulose as ordered, titrate as needed to have 2-3 bms    GI consult as needed in hospital for new acute issues, otherwise as outpatient     Goals of care   As per prior rounding provider, \"on 1/25 nursing had mentioned that patient had refused to undergo dialysis and want to stop care, " "palliative care was consulted.  Patient and his spouse denied wanting to stop care and wanted ongoing restorative care including full code\"    Monitor, Full Code status    -Needs placement to TCU     Diarrhea, improved, on lactulose for chronic liver disease  - C difficile negative on 1/31. Secondary to lactulose.  - discontinued rectal tube (2/12) as stools more formed    Continue lactulose with goal of 2 to 3 soft bowel movement in 24 hours     Paroxysmal atrial fibrillation  Chronic anticoagulation, apixaban  Remains in sinus rhythm, on anticoagulation with apixaban indefinitely for stroke prophylaxis.      Monitor rhythm    Continue apixaban anticoagulation    Monitor hemoglobin, see below     Acute anemia  Pt has required intermittent transfusions for on-going anemia.  Anemia most likely secondary to critical illness/chronic disease, chronic renal disease.  Baseline hemoglobin around 7-8  Likely Epo resistance    Transfuse packed red blood cells to keep hemoglobin > 7    Epoetin lfa-epbx use as per nephrology     History PEA arrest   PEA arrest prior to his previous admission and 1 episode of PEA associated with desaturation on 12/20.  No structural cardiac disease.     Stable, continue cardiac Monitoring     Chronic Hypotension  In the past has developed baseline hypotension with cirrhosis and prolonged critical illness.  On hemodialysis.  Receiving midodrine and albumin during dailysis    Continue midodrine, as per nephrology      History of seizure   After hypoxic event, in the context of baseline multifactorial encephalopathy secondary to his ongoing critical illness and prior cardiac arrests and prior strokes and cirrhosis with hepatic encephalopathy. MRI of head of 12/20/22 reveals no PRES or leptomeningeal enhancement but scattered.  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   No recent seizure activity.    Continue levetiracetam, lacosamide "     Seizure precautions    Outpatient follow-up with neurology, consult inpatient if needed     # ESRD  # Anemia due to renal disease  # Hypotension during dialysis  -Underwent dialysis  on 2/22/23 and was hypotensive and received midodrine and albumin  Receiving albumin with dialysis       Diabetes mellitus type 2  *Hemoglobin A1c on November 2022 was 4.7  *By report, on Lantus insulin in the past.  Blood sugar checks and sliding scale insulin previously discontinued as has been stable without insulin needs    Monitor with periodic blood sugar checks as needed     Severe malnutrition, protein and calorie type  Moderate dysphagia  G-tube feedings    Continue with tube feeds via PEG tube    IR replaced the PEG tube on 2/8/2023, monitor    Nutritionist consulted and following for tube feeds    SLP following as well. Oral diet as per speech and language therapy (SLP)     2/20 Nutrition following for tube feeds    Underwent video swallow on 2/23/22     3/1: Change PPI to daily as per family's request    Anxiety  Fluoxetine was discontinued as per psychiatry recommendation on 2/2 (see consult note for details)     Stable, monitor; comfort and reassurance offered during visit    Psychiatry follow-up       Diet: Room Service  Snacks/Supplements Adult: Other; Gelatein+ with brkfst and lunch, and magic cup with dinner (RD); With Meals  Adult Formula Bolus Feeding: Novasource Renal; Route: Gastrostomy; 3 times daily; Volume per Bolus: 240; mL(s); Continue to encourage after meal bolus at 80 mL/hr x 3 hrs, If patient consumes <50% of that meal  Combination Diet Regular Diet; Mildly Thick (level 2) (OK for fresh fruit (e.g., pineapple) per SLP)    DVT Prophylaxis: DOAC  Nixon Catheter: Not present  Lines: PRESENT      CVC Double Lumen Right External jugular Tunneled-Site Assessment: WDL      Cardiac Monitoring: None  Code Status: Full Code      Clinically Significant Risk Factors           # Hypercalcemia: Highest Ca = 11 mg/dL  in last 2 days, will monitor as appropriate    # Hypoalbuminemia: Lowest albumin = 1.9 g/dL at 1/23/2023  6:38 AM, will monitor as appropriate            # Severe Malnutrition: based on nutrition assessment        Disposition Plan      Expected Discharge Date: 03/07/2023, 12:00 PM  Discharge Delays: Placement - LTC  Destination: inpatient rehabilitation facility  Discharge Comments: admitted on 1-21 from [facility], Dialysis pt MWF. Will need placement        Pako Tan MD  Hospitalist Service  [facility]  Securely message with [employer] (more info)  Text page via [employer] [facility] [facility] Paging/Directory   ______________________________________________________________________    Interval History   Following this confusion last night but better this morning  Cough  No fever/chills    Physical Exam   /75 (BP Location: Left arm)   Pulse 99   Temp 97.5  F (36.4  C) (Axillary)   Resp 16   Wt 84.2 kg (185 lb 10 oz)   SpO2 96%   BMI 25.18 kg/m    Gen- pleasant   Throat: no redness/ pus pointer/ candida  Neck- supple  CVS- I+II+ ESM  RS- CTAB, decreased at base with few crackles  Abdo- soft, no tenderness. No g/r/r   Ext-  edema     Medical Decision Making       50 MINUTES SPENT BY ME on the date of service doing chart review, history, exam, documentation & further activities per the note.      Data   ------------------------- PAST 24 HR DATA REVIEWED -----------------------------------------------    I have personally reviewed the following data over the past 24 hrs:    5.4  \   9.5 (L)   / 138 (L)     139 100 52.2 (H) /  108 (H)   5.1 24 4.32 (H) \       ALT: <5 (L) AST: 17 AP: 227 (H) TBILI: 0.4   ALB: 2.6 (L) TOT PROTEIN: 7.0 LIPASE: N/A       Imaging results reviewed over the past 24 hrs:   Recent Results (from the past 24 hour(s))   XR Chest 2 Views    Narrative    EXAM: XR CHEST 2 VIEWS  LOCATION: [facility]  DATE/TIME: 3/4/2023 2:42 PM    INDICATION: Cough  SOB  COMPARISON: 2/3/2023      Impression    IMPRESSION: Prominent cardiomegaly unchanged with coronal IJ dialysis catheter in place. Pulmonary vessels appear normal. Some increase in opacity at right base likely represents a combination of small right pleural effusion and atelectasis/infiltrate.   Upper lung zones remain clear.

## 2023-03-05 NOTE — PLAN OF CARE
Goal Outcome Evaluation:       DATE & TIME: 3/4/23 7066-8228  Cognitive Concerns/ Orientation: A&O x2-3. Forgetful.    BEHAVIOR & AGGRESSION TOOL COLOR: Green , tends to argue/disagree about cares or treatment  ABNL VS/O2: VSS on 2L NC  MOBILITY: Strong Ax2 with mark steady. Repositions self frequently in bed- impulsive, throwing legs over frequently  PAIN MANAGMENT: Denies pain  DIET: Regular diet w mildly thick liquids (level 2) with meds & meals; free thin water between meal, TID bolus TF when < 50% meals eaten(pt declines this).   BOWEL/BLADDER: minimal UOP, intermittently incontinent  DRAIN/DEVICES: No PIV access, MD aware per previous nurse. Rt external jugular tunneled CVC for hemodialysis. Double lumen PEG tube, clamped.   SKIN: Jaundiced, Scattered scabs and bruising. Blanchable redness to coccyx. Light redness left elbow, Mepilex in place.  TEST/PROCEDURES: chest xray completed  D/C DATE: Pending TCU placement   OTHER IMPORTANT INFO: non productive loose cough, SOB at times.  Lungs have intermittent rhonchi

## 2023-03-06 ENCOUNTER — APPOINTMENT (OUTPATIENT)
Dept: ULTRASOUND IMAGING | Facility: CLINIC | Age: 58
DRG: 981 | End: 2023-03-06
Attending: INTERNAL MEDICINE
Payer: COMMERCIAL

## 2023-03-06 LAB
% LINING CELLS, BODY FLUID: 2 %
ABSOLUTE NEUTROPHILS, BODY FLUID: 34.6 /UL
ALBUMIN BODY FLUID SOURCE: NORMAL
ALBUMIN FLD-MCNC: 0.6 G/DL
ALBUMIN SERPL BCG-MCNC: 2.6 G/DL (ref 3.5–5.2)
APPEARANCE FLD: ABNORMAL
BASE EXCESS BLDV CALC-SCNC: 4.9 MMOL/L (ref -7.7–1.9)
CELL COUNT BODY FLUID SOURCE: ABNORMAL
COLOR FLD: YELLOW
EOSINOPHIL NFR FLD MANUAL: 1 %
GRAM STAIN RESULT: NORMAL
GRAM STAIN RESULT: NORMAL
HCO3 BLDV-SCNC: 32 MMOL/L (ref 21–28)
LYMPHOCYTES NFR FLD MANUAL: 52 %
MAGNESIUM SERPL-MCNC: 1.5 MG/DL (ref 1.7–2.3)
MAGNESIUM SERPL-MCNC: 1.6 MG/DL (ref 1.7–2.3)
MONOS+MACROS NFR FLD MANUAL: 37 %
NEUTS BAND NFR FLD MANUAL: 8 %
O2/TOTAL GAS SETTING VFR VENT: 2 %
PCO2 BLDV: 64 MM HG (ref 40–50)
PH BLDV: 7.31 [PH] (ref 7.32–7.43)
PHOSPHATE SERPL-MCNC: 3.5 MG/DL (ref 2.5–4.5)
PO2 BLDV: 20 MM HG (ref 25–47)
PROT FLD-MCNC: 1.8 G/DL
PROTEIN BODY FLUID SOURCE: NORMAL
WBC # FLD AUTO: 433 /UL

## 2023-03-06 PROCEDURE — 84157 ASSAY OF PROTEIN OTHER: CPT | Performed by: INTERNAL MEDICINE

## 2023-03-06 PROCEDURE — 120N000001 HC R&B MED SURG/OB

## 2023-03-06 PROCEDURE — 250N000013 HC RX MED GY IP 250 OP 250 PS 637: Performed by: STUDENT IN AN ORGANIZED HEALTH CARE EDUCATION/TRAINING PROGRAM

## 2023-03-06 PROCEDURE — 94660 CPAP INITIATION&MGMT: CPT

## 2023-03-06 PROCEDURE — 0W9G3ZX DRAINAGE OF PERITONEAL CAVITY, PERCUTANEOUS APPROACH, DIAGNOSTIC: ICD-10-PCS | Performed by: INTERNAL MEDICINE

## 2023-03-06 PROCEDURE — 83735 ASSAY OF MAGNESIUM: CPT | Performed by: INTERNAL MEDICINE

## 2023-03-06 PROCEDURE — 250N000013 HC RX MED GY IP 250 OP 250 PS 637

## 2023-03-06 PROCEDURE — 36415 COLL VENOUS BLD VENIPUNCTURE: CPT | Performed by: INTERNAL MEDICINE

## 2023-03-06 PROCEDURE — 250N000013 HC RX MED GY IP 250 OP 250 PS 637: Performed by: HOSPITALIST

## 2023-03-06 PROCEDURE — 250N000013 HC RX MED GY IP 250 OP 250 PS 637: Performed by: INTERNAL MEDICINE

## 2023-03-06 PROCEDURE — 87205 SMEAR GRAM STAIN: CPT | Performed by: INTERNAL MEDICINE

## 2023-03-06 PROCEDURE — 90935 HEMODIALYSIS ONE EVALUATION: CPT | Performed by: INTERNAL MEDICINE

## 2023-03-06 PROCEDURE — 99233 SBSQ HOSP IP/OBS HIGH 50: CPT | Performed by: INTERNAL MEDICINE

## 2023-03-06 PROCEDURE — 84100 ASSAY OF PHOSPHORUS: CPT | Performed by: STUDENT IN AN ORGANIZED HEALTH CARE EDUCATION/TRAINING PROGRAM

## 2023-03-06 PROCEDURE — 250N000009 HC RX 250: Performed by: RADIOLOGY

## 2023-03-06 PROCEDURE — 272N000706 US PARACENTESIS WITH ALBUMIN

## 2023-03-06 PROCEDURE — 36415 COLL VENOUS BLD VENIPUNCTURE: CPT | Performed by: STUDENT IN AN ORGANIZED HEALTH CARE EDUCATION/TRAINING PROGRAM

## 2023-03-06 PROCEDURE — 89051 BODY FLUID CELL COUNT: CPT | Performed by: INTERNAL MEDICINE

## 2023-03-06 PROCEDURE — 250N000012 HC RX MED GY IP 250 OP 636 PS 637: Performed by: STUDENT IN AN ORGANIZED HEALTH CARE EDUCATION/TRAINING PROGRAM

## 2023-03-06 PROCEDURE — 83735 ASSAY OF MAGNESIUM: CPT | Performed by: STUDENT IN AN ORGANIZED HEALTH CARE EDUCATION/TRAINING PROGRAM

## 2023-03-06 PROCEDURE — 999N000157 HC STATISTIC RCP TIME EA 10 MIN

## 2023-03-06 PROCEDURE — 82042 OTHER SOURCE ALBUMIN QUAN EA: CPT | Performed by: INTERNAL MEDICINE

## 2023-03-06 PROCEDURE — 90937 HEMODIALYSIS REPEATED EVAL: CPT

## 2023-03-06 PROCEDURE — 87070 CULTURE OTHR SPECIMN AEROBIC: CPT | Performed by: INTERNAL MEDICINE

## 2023-03-06 PROCEDURE — 82040 ASSAY OF SERUM ALBUMIN: CPT | Performed by: INTERNAL MEDICINE

## 2023-03-06 PROCEDURE — 82803 BLOOD GASES ANY COMBINATION: CPT | Performed by: INTERNAL MEDICINE

## 2023-03-06 RX ORDER — MAGNESIUM SULFATE HEPTAHYDRATE 40 MG/ML
2 INJECTION, SOLUTION INTRAVENOUS ONCE
Status: DISCONTINUED | OUTPATIENT
Start: 2023-03-06 | End: 2023-03-06

## 2023-03-06 RX ORDER — MAGNESIUM OXIDE 400 MG/1
400 TABLET ORAL ONCE
Status: COMPLETED | OUTPATIENT
Start: 2023-03-06 | End: 2023-03-06

## 2023-03-06 RX ORDER — LIDOCAINE HYDROCHLORIDE 10 MG/ML
10 INJECTION, SOLUTION EPIDURAL; INFILTRATION; INTRACAUDAL; PERINEURAL ONCE
Status: COMPLETED | OUTPATIENT
Start: 2023-03-06 | End: 2023-03-06

## 2023-03-06 RX ORDER — MAGNESIUM SULFATE HEPTAHYDRATE 40 MG/ML
2 INJECTION, SOLUTION INTRAVENOUS ONCE
Status: COMPLETED | OUTPATIENT
Start: 2023-03-06 | End: 2023-03-06

## 2023-03-06 RX ADMIN — LACOSAMIDE 100 MG: 100 TABLET, FILM COATED ORAL at 00:18

## 2023-03-06 RX ADMIN — MIDODRINE HYDROCHLORIDE 10 MG: 5 TABLET ORAL at 08:19

## 2023-03-06 RX ADMIN — CALCIUM ACETATE 667 MG: 667 CAPSULE ORAL at 12:40

## 2023-03-06 RX ADMIN — LIDOCAINE HYDROCHLORIDE 10 ML: 10 SOLUTION INTRAVENOUS at 14:56

## 2023-03-06 RX ADMIN — LACOSAMIDE 100 MG: 100 TABLET, FILM COATED ORAL at 12:40

## 2023-03-06 RX ADMIN — FOLIC ACID 1 MG: 1 TABLET ORAL at 08:14

## 2023-03-06 RX ADMIN — Medication 50 MCG: at 08:14

## 2023-03-06 RX ADMIN — OLANZAPINE 5 MG: 5 TABLET, ORALLY DISINTEGRATING ORAL at 20:44

## 2023-03-06 RX ADMIN — MIDODRINE HYDROCHLORIDE 10 MG: 5 TABLET ORAL at 12:40

## 2023-03-06 RX ADMIN — APIXABAN 5 MG: 5 TABLET, FILM COATED ORAL at 08:15

## 2023-03-06 RX ADMIN — RIFAXIMIN 550 MG: 550 TABLET ORAL at 08:15

## 2023-03-06 RX ADMIN — Medication 400 MG: at 22:15

## 2023-03-06 RX ADMIN — WHITE PETROLATUM: 1.75 OINTMENT TOPICAL at 08:18

## 2023-03-06 RX ADMIN — MAGNESIUM OXIDE TAB 400 MG (241.3 MG ELEMENTAL MG) 400 MG: 400 (241.3 MG) TAB at 17:49

## 2023-03-06 RX ADMIN — OLANZAPINE 5 MG: 5 TABLET, ORALLY DISINTEGRATING ORAL at 08:15

## 2023-03-06 RX ADMIN — OLANZAPINE 5 MG: 5 TABLET, ORALLY DISINTEGRATING ORAL at 15:16

## 2023-03-06 RX ADMIN — CALCIUM ACETATE 667 MG: 667 CAPSULE ORAL at 17:48

## 2023-03-06 RX ADMIN — LACTULOSE 10 G: 10 SOLUTION ORAL at 20:44

## 2023-03-06 RX ADMIN — LEVETIRACETAM 1000 MG: 500 TABLET, FILM COATED ORAL at 22:15

## 2023-03-06 RX ADMIN — APIXABAN 5 MG: 5 TABLET, FILM COATED ORAL at 20:44

## 2023-03-06 RX ADMIN — MIDODRINE HYDROCHLORIDE 10 MG: 5 TABLET ORAL at 17:48

## 2023-03-06 RX ADMIN — RAMELTEON 8 MG: 8 TABLET, FILM COATED ORAL at 20:44

## 2023-03-06 RX ADMIN — RIFAXIMIN 550 MG: 550 TABLET ORAL at 20:44

## 2023-03-06 ASSESSMENT — ACTIVITIES OF DAILY LIVING (ADL)
ADLS_ACUITY_SCORE: 64
ADLS_ACUITY_SCORE: 64
ADLS_ACUITY_SCORE: 66
ADLS_ACUITY_SCORE: 64
ADLS_ACUITY_SCORE: 66
ADLS_ACUITY_SCORE: 64
ADLS_ACUITY_SCORE: 66
ADLS_ACUITY_SCORE: 64
ADLS_ACUITY_SCORE: 66
ADLS_ACUITY_SCORE: 64

## 2023-03-06 NOTE — PROGRESS NOTES
MD Notification    Notified Person: MD    Notified Person Name: Pako Deng    Notification Date/Time: 3/6/23 1600    Notification Interaction: Vocera    Purpose of Notification: Pt refusing IV placement, Mg protocol is requesting IV replacement. Paged MD to obtain and order for oral replacement.     Orders Received: MD acknowledged message and will order oral replacement    Comments:

## 2023-03-06 NOTE — PROGRESS NOTES
Writer contacted house officer (Jihan Calle NP) in regards of how long to trial BiPAP with this patient. House officer told writer she believes policy for this type of BIPAP trial would be 4 hrs. Will recheck ABG in 4 hrs and notify house officer.     Patient constantly trying to pull off BiPAP from 6-8pm- RN and NA tried multiple times to distract/redirect. Pt was put in 2 soft upper restraints at 2150. House officer (Dylan Prather NP) notified. Verbal order to give 2200 dose of zyprexa via feeding tube and put in order for 2 point upper soft restraints.     RT april ABG at 2100- CO2 went down to 57 from 70. Writer notified Dylan Prather NP.     Ymailka EMMANUEL advised to keep BiPAP on and redraw ABG's in AM. Also ordered another 5mg of PO zyprexa via feeding tube due to pt still restless/fighting the BiPAP.

## 2023-03-06 NOTE — PROGRESS NOTES
Potassium   Date Value Ref Range Status   03/05/2023 5.1 3.4 - 5.3 mmol/L Final   12/29/2022 3.4 3.4 - 5.3 mmol/L Final   04/20/2020 3.9 3.4 - 5.3 mmol/L Final     Potassium POCT   Date Value Ref Range Status   01/19/2023 3.6 3.5 - 5.0 mmol/L Final     Hemoglobin   Date Value Ref Range Status   03/05/2023 9.3 (L) 13.3 - 17.7 g/dL Final   04/20/2020 14.3 13.3 - 17.7 g/dL Final     Creatinine   Date Value Ref Range Status   03/05/2023 4.32 (H) 0.67 - 1.17 mg/dL Final   04/20/2020 1.06 0.66 - 1.25 mg/dL Final     Urea Nitrogen   Date Value Ref Range Status   03/05/2023 52.2 (H) 6.0 - 20.0 mg/dL Final   12/29/2022 46 (H) 7 - 30 mg/dL Final   04/20/2020 23 7 - 30 mg/dL Final     Sodium   Date Value Ref Range Status   03/05/2023 139 136 - 145 mmol/L Final   04/20/2020 139 133 - 144 mmol/L Final     INR   Date Value Ref Range Status   12/21/2022 1.36 (H) 0.85 - 1.15 Final   10/07/2019 1.20 (H) 0.86 - 1.14 Final       DIALYSIS PROCEDURE NOTE  Hepatitis status of previous patient on machine log was checked and verified ok to use with this patients hepatitis status.  Patient dialyzed for 3hrs. on a K2 bath with a net fluid removal of 1L.  A BFR of 350 ml/min was obtained via a Right tunnelled CVC catheter.      The treatment plan was discussed with  during the treatment.    Total heparin received during the treatment: 0 units.      Line flushed, clamped and capped with heparin 1:1000 1.9 mL (1900 units) per lumen     Meds given: none  Complications: none       Person educated: Patient. Knowledge base minimal. Barriers to learning: cognitive status declined. Educated on procedure via verbal mode. Patient/family verbalized understanding.      ICEBOAT? Timeout performed pre-treatment  I: Patient was identified using 2 identifiers  C:  Consent Signed Yes  E: Equipment preventative maintenance is current and dialysis delivery system OK to use  B: Hepatitis B Surface Antigen: negative; Draw Date: 3/1/23      Hepatitis B  Surface Antibody: Unknown; Draw Date: 3/1/23  O: Dialysis orders present and complete prior to treatment  A: Vascular access verified and assessed prior to treatment  T: Treatment was performed at a clinically appropriate time  ?: Patient was allowed to ask questions and address concerns prior to treatment  See Adult Hemodialysis flowsheet in EPIC for further details and post assessment.  Machine water alarm in place and functioning. Transducer pods intact and checked every 15min.   Pt returned via bed transport.  Chlorine/Chloramine water system checked every 4 hours.  Outpatient Dialysis TBD   Patient repositioned every 2 hours during the treatment.  Post treatment report given to GERTRUDE Schwartz RN regarding 1L of fluid removed, last BP of 111/76, and patient pain rating of 0/10.

## 2023-03-06 NOTE — PLAN OF CARE
Goal Outcome Evaluation:       DATE & TIME: 3/5/23-3/6/23 6951-8080  Cognitive Concerns/ Orientation: A&O x2. Forgetful.    BEHAVIOR & AGGRESSION TOOL COLOR: Yellow,  Restless/agitated, pulling at lines, and BiPaP mask  ABNL VS/O2: VSS on BiPAP  ABNL Labs: CO2 70,57 recheck in AM  MOBILITY: Strong Ax2 with mark steady; Not OOB this shift. Repositions self frequently in bed- impulsive, throwing legs over frequently attempting to get up  PAIN MANAGMENT: Denies pain  DIET: Regular diet w mildly thick liquids (level 2) with meds & meals; free thin water between meal. NPO while on BiPaP this shift.  BOWEL/BLADDER: Incontinent of BM x1  DRAIN/DEVICES: No PIV access, MD aware. Rt external jugular tunneled CVC for hemodialysis. Double lumen PEG tube, clamped.   SKIN: Jaundiced, Scattered scabs and bruising. Blanchable redness to coccyx. abrasion left elbow, Mepilex in place.  TEST/PROCEDURES: Plan for dialysis today, per MD note.  D/C DATE: Pending TCU placement   OTHER IMPORTANT INFO: CO2 elevated- Trial of bipap started @ 1755 through the night.  Restless and agitated , trying to remove BiPAP mask, scheduled and one time dose of Zyprexa given, and Soft wrist restraint ordered for 24 hours.  Pharmacist changed tacrolims tablet to suspension and gave all medications via g-tube per pharmacist approval.  Patient continues to be restless, redirection provided frequently by writer and bedside attendant. Continues on soft wrist restraint.

## 2023-03-06 NOTE — PROGRESS NOTES
Pt currently on bipap 16/6 R16 25% with SpO2 of upper 90's.  BS diminished/crackles fine. CO2 improving. Will continue bipap t/o the night and wean as tolerated. Skin intact.     RT will continue to follow    Vangie Enciso RT assistant

## 2023-03-06 NOTE — PLAN OF CARE
Goal Outcome Evaluation:  DATE & TIME: 3/5/23 3088-9252  Cognitive Concerns/ Orientation: A&O x2. Forgetful.    BEHAVIOR & AGGRESSION TOOL COLOR: Green , tends to argue/disagree about cares or treatment.  Restless, pulling at lines  ABNL VS/O2: VSS on 2L NC  MOBILITY: Strong Ax2 with mark steady.  Repositions self frequently in bed- impulsive, throwing legs over frequently   PAIN MANAGMENT: Denies pain  DIET: Regular diet w mildly thick liquids (level 2) with meds & meals; free thin water between meal,good appetite.  BOWEL/BLADDER: Incontinent of BM this shift.   DRAIN/DEVICES: No PIV access, MD aware. Rt external jugular tunneled CVC for hemodialysis. Double lumen PEG tube, clamped.   SKIN: Jaundiced, Scattered scabs and bruising. Blanchable redness to coccyx. abrasion left elbow, Mepilex in place.  TEST/PROCEDURES:labs to be drawn  D/C DATE: Pending TCU placement   OTHER IMPORTANT INFO: restless and agitated this am, early afternoon- PRN zyprexa x1, effective   - Labs showed elevated level of CO2- Trial of bipap started @ 1755- pt attempting to remove, agitated. House officer contacted (anti anxiety?), awaiting response

## 2023-03-06 NOTE — PROGRESS NOTES
"CLINICAL NUTRITION SERVICES - REASSESSMENT NOTE    Recommendations :   - continue supplements as ordered  - continue room service w/ assistance  - recommend back up bolus if intake is <50% of meal   Malnutrition:   (1/22)  % Weight Loss:  > 5% in 1 month (severe malnutrition) - wt loss PTA, has been stable over the past month of admission  % Intake:  </= 50% for >/= 5 days (severe malnutrition)- suspected PTA  Subcutaneous Fat Loss:  Orbital region moderate depletion, Upper arm region moderate-severe depletion and Thoracic region moderate-severe depletion  Muscle Loss:  Temporal region moderate depletion, Clavicle bone region severe depletion, Acromion bone region severe depletion, Patellar region moderate depletion and Anterior thigh region moderate-severe depletion  Fluid Retention:  Trace     Malnutrition Diagnosis: Severe malnutrition  In Context of:  Acute on Chronic illness or disease     EVALUATION OF PROGRESS TOWARD GOALS   Diet: Regular Diet + Mildly Thick Liquids  Supplements:   Gelatein w/ bfast & lunch (150 kcal, 20 g protein)  Magic Cup w/ dinner (290 kcal, 9 g pro)  Room service w/ assist     Nutrition Support:    Type of Feeding Tube: G-tube  Enteral Frequency:  Continuous  Enteral Regimen: Novasource Renal at 80 mL/hr x 3 hours PRN back up bolus for breakfast or lunch if consumes <50%.   Per Bolus Provisions: 240 mL formula = 480 kcal, 22 g protein, 44 g CHO, 0 g fiber and 172 mL free water  Free Water Flush: 100 mL before and after each feeding    Intake/Tolerance:   - RNs documenting \"continues to refuse TID TF boluses\"  - All intakes documented since 3/1 indicate % of meals - adequate. Pt receives large meals: >3000 kcal, >150 g protein per day.   - Labs: reviewed. Today's not back yet.   - Stooling: BM 1-3x daily for the past 5 days. -- Lactulose.   - Weight: last measured 2/27 84.2 kg    - Pt was out of room during attempt to visit.     - Meds:   Folic Acid 1 mg daily   Lactulose BID - to " titrate 2-3 soft stools in 24 hours, hold if >3 loose stools.   Nephrocaps renal multivitamin (held this morning - pt out of room for HD)  Vitamin D3 50 mcg daily     ASSESSED NUTRITION NEEDS:  Dosing Weight (2/27) 84.2 kg   Estimated Energy Needs: 7469-4565 kcals (25-30 Kcal/Kg)  Justification: dialysis  Estimated Protein Needs: 100-125 grams protein (1.2-1.5 g pro/Kg)  Justification: dialysis    NEW FINDINGS:   - 3/3 - VFSS completed by SLP. Swallow function below baseline. Continue reg diet, mildly thick liquids    Previous Goals:   Pt to consume >/= 75% meals and 3 supplements/day  Evaluation: Not met - >50% of meals consumed. Taking supplements.     Previous Nutrition Diagnosis:   No nutrition diagnosis identified at this time   Evaluation: No change    CURRENT NUTRITION DIAGNOSIS  No nutrition diagnosis identified at this time     INTERVENTIONS  Recommendations / Nutrition Prescription  Diet per SLP  Continue supplements as ordered - outlined above  TF bolus if pt consumes <50% of meals (NovaSource Renal 80 mL/hr x 3 hours)    Implementation  None new today     Goals  Pt to consume >/= 75% meals and 3 supplements/day    MONITORING AND EVALUATION:  Progress towards goals will be monitored and evaluated per protocol and Practice Guidelines    Edna Osuna RD, LD  [facility]  Pager: [phone]  Weekend Pager: [phone]

## 2023-03-06 NOTE — PROGRESS NOTES
[facility]    Medicine Progress Note - Hospitalist Service    Date of Admission:  1/21/2023    Assessment & Plan   Blanco Osborne is a 57 year-old male admitted on 1/21/2023 as a transfer from [facility] for evaluation of loculated pleural effusion. He has extensive PMH including h/o liver transplant 2016 due to alcohol abuse, pAF on chronic anticoagulation, history of diabetes mellitus type 2, CVA, hypertension, CHRISTIAN on BiPAP.  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 He was recovering in [facility] and was transferred to [facility] for consideration of chest tube placement and possible intrapleural lysis for worsening effusion.     Acute metabolic encephalopathy with delirium, multifactorial  Hepatic encephalopathy  Chronic liver disease, history of liver transplant 2016  End-stage renal disease (ESRD), on hemodialysis (HD)  History of seizure, on Keppra and Vimpat  Waxing and waning mentation, coinciding with lactulose being held at [facility]  Earlier in hospital stay, remained confused and had pulled out tracheostomy tube, PICC line and pulled his dialysis catheter.  Lines replaced.  Palliative care consult 1/26, see note.  * Psychiatry consulted 2/2, see note, follow-up requested.    Continue to reorient as needed; maintain normal day/night, sleep wake cycles; minimize sedating/altering medications as able    Continue Lactulose to 10 mg BID, monitor for symptoms to have 2-3 Bms    Ongoing hemodialysis, as per nephrology, on M-W-F dialysis schedule; ongoing nephrology consult follow-up appreciated    2/20/23 Off restraints.Mentation has improved    2/21/23 -Fall this morningWorsening.Delirium. Ramelteon started.Psyciatry consult appreciated  2/22/23 Improved mentation     2/28:  Appreciate psychiatry evaluation: Increased Zyprexa to 5 mg twice daily and continuing 5 mg twice daily as needed.  Also continue Ramelteon 8 mg nightly    3/6: Ct increased confusion . Hypercarbia. (respiratory acidosis)  - ct prn Bipap  - Paracentesis to r/o SBP  - ct lactulose to ensure 3-4 loose stools/ day    Hypomagnesemia: replace    Bilateral pleural effusions   Acute respiratory failure requiring tracheostomy  - resolved    Pneumonia  - resolved   ARDS  - resolved    Right pneumothorax - resolved   *Initial event was parainfluenza and strep pneumo pneumonia back in November 2022. Treated for ARDS. Had failed extubation x2 and tracheostomy performed on previous hospitalization. *Had additional complications of bilateral pneumothoraces as well as hydropneumothorax- pleural fluid grew candida parapsilosis  *Completed 4-week antifungal treatment. [facility]  *Recently there were concern for worsening effusion while at [facility] and had thoracentesis 01/13 which returned exudative without growth on cultures. CT chest/abdomen/pelvis on 01/18 demonstrated worsening effusions and concerns for infected parapneumonic effusions with possible SBP.  Multiple pulmonologist at [facility] reviewed CT scan recommended transfer to [facility] for consideration of chest tube placement and possible intrapleural lysis  *Thoracic surgery (Dr. Jackson) consulted during admission   *CT chest 1/22, small effusions on imaging and so chest tube was not inserted.   ID consulted, see note 2/10.  *Patient pulled out his tracheostomy tube on the night 2/1-2/2 and is tolerating well without increased shortness of breath persistent cough or worsening hypoxia  * Chest x-ray 2/3 with cardiomegaly, chronic small bilateral pleural effusions, no pulmonary edema; right internal jugular dialysis catheter in place    Monitor respiratory status, recently stable on room air    Encourage incentive spirometry  "as able    Wound care previously consulted for tracheostomy site care, monitor for signs and symptoms of infection      Discontinued nystatin 3/01    3/4,3/5:  chest x-ray:Some increase in opacity at right base likely represents a combination of small right pleural effusion and atelectasis/infiltrate.  Normal WBC count of 5.4  -Encourage incentive spirometer and Acapella.  Monitor closely    S/p liver transplant 2016   Chronic immunosuppression, on tacrolimus  Cirrhosis with ascites  Subacute bacterial peritonitis (SBP), resolved  *Main manifestations of this are hepatic encephalopathy and ascites.  Hepatologist at [place] felt that most likely reason for the cirrhosis was metabolic syndrome or alcohol intake.  There was no evidence of rejection on biopsy and there was some evidence of regeneration. Paracentesis done on 12/22/2022 showed lactobacillus indicating SBP, treated with Unasyn and Augmentin, finished 2-week course 1/12/2023.  Requires large-volume paracentesis periodically for recurring ascites.    *Paracentesis 1/13/2023 yielded about 7500 cc. Cultures NGTD.  Most recent paracentesis on 1/24 with 4.8 L fluid removal    Continue intermittent paracentesis as needed if develops symptomatic ascites    Monitor for signs and symptoms of recurrent spontaneous bacterial peritonitis     Further treatment for recurrent ascites with TIPS deferred to his Liver Transplant team and/or Hepatology, he has an appointment on 4/21/2023 with Hepatology, Dr. Simms    Continue Tacrolimus 1 mg BID, rifaximin 550 mg BID    Continue lactulose as ordered, titrate as needed to have 2-3 bms    GI consult as needed in hospital for new acute issues, otherwise as outpatient     3/6:   Goals of care   As per prior rounding provider, \"on 1/25 nursing had mentioned that patient had refused to undergo dialysis and want to stop care, palliative care was consulted.  Patient and his spouse denied wanting to stop care and wanted ongoing " "restorative care including full code\"    Monitor, Full Code status    -Needs placement to TCU     Diarrhea, improved, on lactulose for chronic liver disease  - C difficile negative on 1/31. Secondary to lactulose.  - discontinued rectal tube (2/12) as stools more formed    Continue lactulose with goal of 2 to 3 soft bowel movement in 24 hours     Paroxysmal atrial fibrillation  Chronic anticoagulation, apixaban  Remains in sinus rhythm, on anticoagulation with apixaban indefinitely for stroke prophylaxis.      Monitor rhythm    Continue apixaban anticoagulation    Monitor hemoglobin, see below     Acute anemia  Pt has required intermittent transfusions for on-going anemia.  Anemia most likely secondary to critical illness/chronic disease, chronic renal disease.  Baseline hemoglobin around 7-8  Likely Epo resistance    Transfuse packed red blood cells to keep hemoglobin > 7    Epoetin lfa-epbx use as per nephrology     History PEA arrest   PEA arrest prior to his previous admission and 1 episode of PEA associated with desaturation on 12/20.  No structural cardiac disease.     Stable, continue cardiac Monitoring     Chronic Hypotension  In the past has developed baseline hypotension with cirrhosis and prolonged critical illness.  On hemodialysis.  Receiving midodrine and albumin during dailysis    Continue midodrine, as per nephrology      History of seizure   After hypoxic event, in the context of baseline multifactorial encephalopathy secondary to his ongoing critical illness and prior cardiac arrests and prior strokes and cirrhosis with hepatic encephalopathy. MRI of head of 12/20/22 reveals no PRES or leptomeningeal enhancement but scattered.  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   No recent seizure activity.    Continue levetiracetam, lacosamide     Seizure precautions    Outpatient follow-up with neurology, consult inpatient if needed     # ESRD  # " Anemia due to renal disease  # Hypotension during dialysis  -Underwent dialysis  on 2/22/23 and was hypotensive and received midodrine and albumin  Receiving albumin with dialysis       Diabetes mellitus type 2  *Hemoglobin A1c on November 2022 was 4.7  *By report, on Lantus insulin in the past.  Blood sugar checks and sliding scale insulin previously discontinued as has been stable without insulin needs    Monitor with periodic blood sugar checks as needed     Severe malnutrition, protein and calorie type  Moderate dysphagia  G-tube feedings    Continue with tube feeds via PEG tube    IR replaced the PEG tube on 2/8/2023, monitor    Nutritionist consulted and following for tube feeds    SLP following as well. Oral diet as per speech and language therapy (SLP)     2/20 Nutrition following for tube feeds    Underwent video swallow on 2/23/22     3/1: Change PPI to daily as per family's request    Anxiety  Fluoxetine was discontinued as per psychiatry recommendation on 2/2 (see consult note for details)     Stable, monitor; comfort and reassurance offered during visit    Psychiatry follow-up       Diet: Room Service  Snacks/Supplements Adult: Other; Gelatein+ with brkfst and lunch, and magic cup with dinner (RD); With Meals  Adult Formula Bolus Feeding: Novasource Renal; Route: Gastrostomy; 3 times daily; Volume per Bolus: 240; mL(s); Continue to encourage after meal bolus at 80 mL/hr x 3 hrs, If patient consumes <50% of that meal  Combination Diet Regular Diet; Mildly Thick (level 2) (OK for fresh fruit (e.g., pineapple) per SLP)    DVT Prophylaxis: DOAC  Nixon Catheter: Not present  Lines: PRESENT      CVC Double Lumen Right External jugular Tunneled-Site Assessment: WDL      Cardiac Monitoring: None  Code Status: Full Code      Clinically Significant Risk Factors           # Hypercalcemia: Highest Ca = 11 mg/dL in last 2 days, will monitor as appropriate  # Hypomagnesemia: Lowest Mg = 1.5 mg/dL in last 2 days, will  replace as needed   # Hypoalbuminemia: Lowest albumin = 1.9 g/dL at 1/23/2023  6:38 AM, will monitor as appropriate   # Thrombocytopenia: Lowest platelets = 108 in last 2 days, will monitor for bleeding          # Severe Malnutrition: based on nutrition assessment        Disposition Plan      Expected Discharge Date: 03/09/2023, 12:00 PM  Discharge Delays: Placement - LTC  Destination: inpatient rehabilitation facility  Discharge Comments: admitted on 1-21 from [facility], Dialysis pt MWF. Will need placement        Pako Tan MD  Hospitalist Service  [facility]  Securely message with [employer] (more info)  Text page via [employer] Paging/Directory   ______________________________________________________________________    Interval History   Continues with increased confusion  No fever/chills    Physical Exam   /76 (BP Location: Left arm, Cuff Size: Adult Regular)   Pulse 102   Temp 97.8  F (36.6  C) (Oral)   Resp 18   Wt 84.2 kg (185 lb 10 oz)   SpO2 99%   BMI 25.18 kg/m    Gen- pleasant , sleeping  Neck- supple  CVS- I+II+ ESM  RS- CTAB, decreased at base with few crackles  Abdo- soft, no tenderness. No g/r/r distention ++   Ext-  edema     Medical Decision Making       50 MINUTES SPENT BY ME on the date of service doing chart review, history, exam, documentation & further activities per the note.      Data   ------------------------- PAST 24 HR DATA REVIEWED -----------------------------------------------    I have personally reviewed the following data over the past 24 hrs:    4.4  \   9.3 (L)   / 108 (L)     N/A N/A N/A /  N/A   N/A N/A N/A \       Imaging results reviewed over the past 24 hrs:   No results found for this or any previous visit (from the past 24 hour(s)).

## 2023-03-06 NOTE — PROGRESS NOTES
Renal Medicine Inpatient Dialysis Note                                Blanco Osborne MRN# [id number]   Age: 57 year old YOB: 1965   Date of Admission: 1/21/2023 Hospital LOS: 44          Assessment/Plan:     Following ARF without recovery/current dialysis dependence      1) End Stage Kidney Disease (CKD and subsequent ATN with non-recovery)    Initially LORETTA, but no renal recovery (anuric/oliguric), now considered ESRD.   Chronic MWF HD via CVC, 3 hr runs.     CVC function much better today.       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  PTH is below target.  Hard to get this to increase.  Vit D also below target.  On Vit D 2000 units daily.  Phos 6.7.  Ca Ac ordered.       2) Anemia:  EPO 20 K units.   persistent anemia despite adequate iron stores and high epo dosing suggesting some epo resistance.      3) Disposition:  Not returning to LTACH. In case not able to go to inpatient rehab, would consider [facility] for TCU/rehab and dialysis. He is not currently a candidate for outpt HD.         MWF schedule  Next 03/08/22      Interval History:     Dialysis run parameters reviewed with dialysis RN at patient bedside.    3 hours  2K  No heparin  1 liter UF     ml/min    BP acceptable    Lethargic but follows  Appears appropriate       ROS     GENERAL: NAD, No fever,chills  R: NEGATIVE for significant cough or SOB  CV: NEGATIVE for chest pain, palpitations    Dialysis Parameters:     Vitals were reviewed  Patient Vitals for the past 8 hrs:   BP Temp Temp src Pulse Resp SpO2   03/06/23 0915 105/68 -- -- 97 (!) 37 92 %   03/06/23 0900 106/74 -- -- 94 30 95 %   03/06/23 0845 119/78 -- -- 93 24 100 %   03/06/23 0835 115/73 97.6  F (36.4  C) Axillary 93 20 99 %   03/06/23 0728 133/86 97.4  F (36.3  C) Axillary 95 20 98 %     I/O last 3 completed shifts:  In: 830 [P.O.:590; NG/GT:240]  Out: -     Vitals:    02/10/23 0425 02/11/23 0339 02/17/23 0500 02/22/23 0500   Weight: 84.6 kg (186 lb  8.2 oz) 86 kg (189 lb 9.5 oz) 87.6 kg (193 lb 2 oz) 82.3 kg (181 lb 7 oz)    02/27/23 1219   Weight: 84.2 kg (185 lb 10 oz)       Current Weight: ??  Dry Weight: ??  Dialysis Temp: 36.5  C  Access Device: IJ  Access Site: right  Dialyzer: Revaclear  Dialysis Bath: 2  Sodium Profile: n  UF Goal: 1  Blood Flow Rate (mL/min): 350  Total Treatment Time (hrs): 3  Heparin: Low dose as required      EPO dose: y  Zemplar: n  IV Fe: n      Medications and Allergies:     Reviewed      Physical Exam:     Seen and examined during course of dialysis run    /68   Pulse 97   Temp 97.6  F (36.4  C) (Axillary)   Resp (!) 37   Wt 84.2 kg (185 lb 10 oz)   SpO2 92%   BMI 25.18 kg/m      GENERAL: awake, alert, follows  HEENT: NC/AT, PERRLA, EOMI, non icteric  RESP: clear   CV: RRR, normal S1 S2  ABDOMEN: S/NT, BS present  MS: no edema  SKIN: catheter site clean without drainage  NEURO: speech normal    Data:       Recent Labs   Lab 03/05/23  0903      POTASSIUM 5.1   CHLORIDE 100   CO2 24   ANIONGAP 15   *   BUN 52.2*   CR 4.32*   GFRESTIMATED 15*   KELLY 11.0*     Recent Labs   Lab 03/05/23  0903 03/04/23  1020 03/02/23  1002 02/27/23  1149   CR 4.32* 3.36* 3.46* 2.49*     Recent Labs   Lab 03/05/23  0903 03/02/23  1002   ALBUMIN 2.6* 2.7*     Recent Labs   Lab 03/05/23  1605 03/04/23  1821 03/02/23  1002 02/27/23  1149   PHOS  --   --   --  3.1   HGB 9.3* 9.5* 9.2* 9.3*         G Jose Reilly MD    [facility] - Nephrology  [phone]

## 2023-03-07 ENCOUNTER — APPOINTMENT (OUTPATIENT)
Dept: SPEECH THERAPY | Facility: CLINIC | Age: 58
DRG: 981 | End: 2023-03-07
Attending: STUDENT IN AN ORGANIZED HEALTH CARE EDUCATION/TRAINING PROGRAM
Payer: COMMERCIAL

## 2023-03-07 ENCOUNTER — APPOINTMENT (OUTPATIENT)
Dept: CT IMAGING | Facility: CLINIC | Age: 58
DRG: 981 | End: 2023-03-07
Attending: INTERNAL MEDICINE
Payer: COMMERCIAL

## 2023-03-07 LAB
ANION GAP SERPL CALCULATED.3IONS-SCNC: 9 MMOL/L (ref 7–15)
BASE EXCESS BLDV CALC-SCNC: 3.1 MMOL/L (ref -7.7–1.9)
BASE EXCESS BLDV CALC-SCNC: 4.8 MMOL/L (ref -7.7–1.9)
BUN SERPL-MCNC: 46 MG/DL (ref 6–20)
CALCIUM SERPL-MCNC: 10.7 MG/DL (ref 8.6–10)
CHLORIDE SERPL-SCNC: 98 MMOL/L (ref 98–107)
CREAT SERPL-MCNC: 3.91 MG/DL (ref 0.67–1.17)
DEPRECATED HCO3 PLAS-SCNC: 30 MMOL/L (ref 22–29)
ERYTHROCYTE [DISTWIDTH] IN BLOOD BY AUTOMATED COUNT: 15.8 % (ref 10–15)
GFR SERPL CREATININE-BSD FRML MDRD: 17 ML/MIN/1.73M2
GLUCOSE SERPL-MCNC: 114 MG/DL (ref 70–99)
HCO3 BLDV-SCNC: 32 MMOL/L (ref 21–28)
HCO3 BLDV-SCNC: 32 MMOL/L (ref 21–28)
HCT VFR BLD AUTO: 30.2 % (ref 40–53)
HGB BLD-MCNC: 8.8 G/DL (ref 13.3–17.7)
MAGNESIUM SERPL-MCNC: 1.9 MG/DL (ref 1.7–2.3)
MCH RBC QN AUTO: 29.6 PG (ref 26.5–33)
MCHC RBC AUTO-ENTMCNC: 29.1 G/DL (ref 31.5–36.5)
MCV RBC AUTO: 102 FL (ref 78–100)
O2/TOTAL GAS SETTING VFR VENT: 25 %
O2/TOTAL GAS SETTING VFR VENT: 25 %
OXYHGB MFR BLDV: 63 % (ref 70–75)
PCO2 BLDV: 60 MM HG (ref 40–50)
PCO2 BLDV: 66 MM HG (ref 40–50)
PH BLDV: 7.29 [PH] (ref 7.32–7.43)
PH BLDV: 7.33 [PH] (ref 7.32–7.43)
PLATELET # BLD AUTO: 134 10E3/UL (ref 150–450)
PO2 BLDV: 18 MM HG (ref 25–47)
PO2 BLDV: 40 MM HG (ref 25–47)
POTASSIUM SERPL-SCNC: 4.6 MMOL/L (ref 3.4–5.3)
PTH-INTACT SERPL-MCNC: 7 PG/ML (ref 15–65)
RBC # BLD AUTO: 2.97 10E6/UL (ref 4.4–5.9)
SODIUM SERPL-SCNC: 137 MMOL/L (ref 136–145)
WBC # BLD AUTO: 4.6 10E3/UL (ref 4–11)

## 2023-03-07 PROCEDURE — 120N000001 HC R&B MED SURG/OB

## 2023-03-07 PROCEDURE — 82306 VITAMIN D 25 HYDROXY: CPT | Performed by: INTERNAL MEDICINE

## 2023-03-07 PROCEDURE — 85027 COMPLETE CBC AUTOMATED: CPT | Performed by: INTERNAL MEDICINE

## 2023-03-07 PROCEDURE — 250N000012 HC RX MED GY IP 250 OP 636 PS 637: Performed by: STUDENT IN AN ORGANIZED HEALTH CARE EDUCATION/TRAINING PROGRAM

## 2023-03-07 PROCEDURE — 250N000013 HC RX MED GY IP 250 OP 250 PS 637: Performed by: STUDENT IN AN ORGANIZED HEALTH CARE EDUCATION/TRAINING PROGRAM

## 2023-03-07 PROCEDURE — 36415 COLL VENOUS BLD VENIPUNCTURE: CPT | Performed by: INTERNAL MEDICINE

## 2023-03-07 PROCEDURE — 83970 ASSAY OF PARATHORMONE: CPT | Performed by: INTERNAL MEDICINE

## 2023-03-07 PROCEDURE — 250N000013 HC RX MED GY IP 250 OP 250 PS 637: Performed by: INTERNAL MEDICINE

## 2023-03-07 PROCEDURE — 82805 BLOOD GASES W/O2 SATURATION: CPT | Performed by: INTERNAL MEDICINE

## 2023-03-07 PROCEDURE — 250N000013 HC RX MED GY IP 250 OP 250 PS 637

## 2023-03-07 PROCEDURE — 94660 CPAP INITIATION&MGMT: CPT

## 2023-03-07 PROCEDURE — 80048 BASIC METABOLIC PNL TOTAL CA: CPT | Performed by: INTERNAL MEDICINE

## 2023-03-07 PROCEDURE — 83735 ASSAY OF MAGNESIUM: CPT | Performed by: HOSPITALIST

## 2023-03-07 PROCEDURE — 92526 ORAL FUNCTION THERAPY: CPT | Mod: GN | Performed by: SPEECH-LANGUAGE PATHOLOGIST

## 2023-03-07 PROCEDURE — 99232 SBSQ HOSP IP/OBS MODERATE 35: CPT | Performed by: INTERNAL MEDICINE

## 2023-03-07 PROCEDURE — 82803 BLOOD GASES ANY COMBINATION: CPT | Performed by: INTERNAL MEDICINE

## 2023-03-07 PROCEDURE — 999N000157 HC STATISTIC RCP TIME EA 10 MIN

## 2023-03-07 PROCEDURE — 71250 CT THORAX DX C-: CPT

## 2023-03-07 PROCEDURE — 99233 SBSQ HOSP IP/OBS HIGH 50: CPT | Performed by: INTERNAL MEDICINE

## 2023-03-07 RX ADMIN — APIXABAN 5 MG: 5 TABLET, FILM COATED ORAL at 09:32

## 2023-03-07 RX ADMIN — Medication 50 MCG: at 09:31

## 2023-03-07 RX ADMIN — RIFAXIMIN 550 MG: 550 TABLET ORAL at 09:31

## 2023-03-07 RX ADMIN — LACOSAMIDE 100 MG: 100 TABLET, FILM COATED ORAL at 00:07

## 2023-03-07 RX ADMIN — Medication 1 CAPSULE: at 09:32

## 2023-03-07 RX ADMIN — ACETAMINOPHEN 650 MG: 325 TABLET, FILM COATED ORAL at 12:12

## 2023-03-07 RX ADMIN — Medication 6 MG: at 01:48

## 2023-03-07 RX ADMIN — OLANZAPINE 5 MG: 5 TABLET, ORALLY DISINTEGRATING ORAL at 20:58

## 2023-03-07 RX ADMIN — MIDODRINE HYDROCHLORIDE 10 MG: 5 TABLET ORAL at 12:11

## 2023-03-07 RX ADMIN — GUAIFENESIN 600 MG: 600 TABLET, EXTENDED RELEASE ORAL at 09:31

## 2023-03-07 RX ADMIN — TACROLIMUS 1 MG: 1 CAPSULE ORAL at 20:58

## 2023-03-07 RX ADMIN — LACOSAMIDE 100 MG: 100 TABLET, FILM COATED ORAL at 22:28

## 2023-03-07 RX ADMIN — GUAIFENESIN 600 MG: 600 TABLET, EXTENDED RELEASE ORAL at 20:58

## 2023-03-07 RX ADMIN — RIFAXIMIN 550 MG: 550 TABLET ORAL at 20:57

## 2023-03-07 RX ADMIN — CALCIUM ACETATE 667 MG: 667 CAPSULE ORAL at 09:31

## 2023-03-07 RX ADMIN — RAMELTEON 8 MG: 8 TABLET, FILM COATED ORAL at 20:58

## 2023-03-07 RX ADMIN — LACOSAMIDE 100 MG: 100 TABLET, FILM COATED ORAL at 12:11

## 2023-03-07 RX ADMIN — MIDODRINE HYDROCHLORIDE 10 MG: 5 TABLET ORAL at 09:31

## 2023-03-07 RX ADMIN — TACROLIMUS 1 MG: 1 CAPSULE ORAL at 09:32

## 2023-03-07 RX ADMIN — OLANZAPINE 5 MG: 5 TABLET, ORALLY DISINTEGRATING ORAL at 00:12

## 2023-03-07 RX ADMIN — ACETAMINOPHEN 650 MG: 325 TABLET, FILM COATED ORAL at 21:25

## 2023-03-07 RX ADMIN — OLANZAPINE 5 MG: 5 TABLET, ORALLY DISINTEGRATING ORAL at 09:32

## 2023-03-07 RX ADMIN — PANTOPRAZOLE SODIUM 40 MG: 40 TABLET, DELAYED RELEASE ORAL at 09:31

## 2023-03-07 RX ADMIN — LIDOCAINE 1 PATCH: 560 PATCH PERCUTANEOUS; TOPICAL; TRANSDERMAL at 09:29

## 2023-03-07 RX ADMIN — FOLIC ACID 1 MG: 1 TABLET ORAL at 09:31

## 2023-03-07 RX ADMIN — APIXABAN 5 MG: 5 TABLET, FILM COATED ORAL at 20:58

## 2023-03-07 RX ADMIN — LEVETIRACETAM 1000 MG: 500 TABLET, FILM COATED ORAL at 20:57

## 2023-03-07 RX ADMIN — MIDODRINE HYDROCHLORIDE 10 MG: 5 TABLET ORAL at 18:40

## 2023-03-07 ASSESSMENT — ACTIVITIES OF DAILY LIVING (ADL)
ADLS_ACUITY_SCORE: 58
ADLS_ACUITY_SCORE: 65
ADLS_ACUITY_SCORE: 58
ADLS_ACUITY_SCORE: 61
ADLS_ACUITY_SCORE: 65
ADLS_ACUITY_SCORE: 65
ADLS_ACUITY_SCORE: 60
ADLS_ACUITY_SCORE: 65
ADLS_ACUITY_SCORE: 65
ADLS_ACUITY_SCORE: 61
ADLS_ACUITY_SCORE: 62
ADLS_ACUITY_SCORE: 63

## 2023-03-07 NOTE — PROGRESS NOTES
[facility]    Medicine Progress Note - Hospitalist Service    Date of Admission:  1/21/2023    Assessment & Plan   Blanco Osborne is a 57 year-old male admitted on 1/21/2023 as a transfer from [facility] for evaluation of loculated pleural effusion. He has extensive PMH including h/o liver transplant 2016 due to alcohol abuse, pAF on chronic anticoagulation, history of diabetes mellitus type 2, CVA, hypertension, CHRISTIAN on BiPAP.  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 He was recovering in [facility] and was transferred to [facility] for consideration of chest tube placement and possible intrapleural lysis for worsening effusion.     Acute metabolic encephalopathy with delirium, multifactorial  Hepatic encephalopathy  Chronic liver disease, history of liver transplant 2016  End-stage renal disease (ESRD), on hemodialysis (HD)  History of seizure, on Keppra and Vimpat  Waxing and waning mentation, coinciding with lactulose being held at [facility]  Earlier in hospital stay, remained confused and had pulled out tracheostomy tube, PICC line and pulled his dialysis catheter.  Lines replaced.  Palliative care consult 1/26, see note.  * Psychiatry consulted 2/2, see note, follow-up requested.    Continue to reorient as needed; maintain normal day/night, sleep wake cycles; minimize sedating/altering medications as able    Continue Lactulose to 10 mg BID, monitor for symptoms to have 2-3 Bms    Ongoing hemodialysis, as per nephrology, on M-W-F dialysis schedule; ongoing nephrology consult follow-up appreciated    2/20/23 Off restraints.Mentation has improved    2/21/23 -Fall this morningWorsening.Delirium. Ramelteon started.Psyciatry consult appreciated  2/22/23 Improved mentation     2/28:  Appreciate psychiatry evaluation: Increased Zyprexa to 5 mg twice daily and continuing 5 mg twice daily as needed.  Also continue Ramelteon 8 mg nightly    3/6, 3,7: Ct increased confusion . Hypercarbia. (respiratory acidosis)  - ct prn Bipap . Will get CT Chest  - Paracentesis to r/o SBP (negative )  - ct lactulose to ensure 3-4 loose stools/ day    Hypomagnesemia: replace    Bilateral pleural effusions   Acute respiratory failure requiring tracheostomy  - resolved    Pneumonia  - resolved   ARDS  - resolved    Right pneumothorax - resolved   *Initial event was parainfluenza and strep pneumo pneumonia back in November 2022. Treated for ARDS. Had failed extubation x2 and tracheostomy performed on previous hospitalization. *Had additional complications of bilateral pneumothoraces as well as hydropneumothorax- pleural fluid grew candida parapsilosis  *Completed 4-week antifungal treatment[facility] from [facility] ventilator.  *Recently there were concern for worsening effusion while at [facility] and had thoracentesis 01/13 which returned exudative without growth on cultures. CT chest/abdomen/pelvis on 01/18 demonstrated worsening effusions and concerns for infected parapneumonic effusions with possible SBP.  Multiple pulmonologist at [facility] reviewed CT scan recommended transfer to [facility] for consideration of chest tube placement and possible intrapleural lysis  *Thoracic surgery (Dr. Jackson) consulted during admission   *CT chest 1/22, small effusions on imaging and so chest tube was not inserted.   ID consulted, see note 2/10.  *Patient pulled out his tracheostomy tube on the night 2/1-2/2 and is tolerating well without increased shortness of breath persistent cough or worsening hypoxia  * Chest x-ray 2/3 with cardiomegaly, chronic small bilateral pleural effusions, no pulmonary edema; right internal jugular dialysis catheter in place    Monitor respiratory status, recently stable on room  "air    Encourage incentive spirometry as able    Wound care previously consulted for tracheostomy site care, monitor for signs and symptoms of infection      Discontinued nystatin 3/01    3/4,3/5:  chest x-ray:Some increase in opacity at right base likely represents a combination of small right pleural effusion and atelectasis/infiltrate.  Normal WBC count of 5.4  -Encourage incentive spirometer and Acapella.  Monitor closely  3/7: We will get CT chest    S/p liver transplant 2016   Chronic immunosuppression, on tacrolimus  Cirrhosis with ascites  Subacute bacterial peritonitis (SBP), resolved  *Main manifestations of this are hepatic encephalopathy and ascites.  Hepatologist at [place] felt that most likely reason for the cirrhosis was metabolic syndrome or alcohol intake.  There was no evidence of rejection on biopsy and there was some evidence of regeneration. Paracentesis done on 12/22/2022 showed lactobacillus indicating SBP, treated with Unasyn and Augmentin, finished 2-week course 1/12/2023.  Requires large-volume paracentesis periodically for recurring ascites.    *Paracentesis 1/13/2023 yielded about 7500 cc. Cultures NGTD.  Most recent paracentesis on 1/24 with 4.8 L fluid removal    Continue intermittent paracentesis as needed if develops symptomatic ascites    Monitor for signs and symptoms of recurrent spontaneous bacterial peritonitis     Further treatment for recurrent ascites with TIPS deferred to his Liver Transplant team and/or Hepatology, he has an appointment on 4/21/2023 with Hepatology, Dr. Simms    Continue Tacrolimus 1 mg BID, rifaximin 550 mg BID    Continue lactulose as ordered, titrate as needed to have 2-3 bms    GI consult as needed in hospital for new acute issues, otherwise as outpatient     3/6:   Goals of care   As per prior rounding provider, \"on 1/25 nursing had mentioned that patient had refused to undergo dialysis and want to stop care, palliative care was consulted.  Patient " "and his spouse denied wanting to stop care and wanted ongoing restorative care including full code\"    Monitor, Full Code status    -Needs placement to TCU     Diarrhea, improved, on lactulose for chronic liver disease  - C difficile negative on 1/31. Secondary to lactulose.  - discontinued rectal tube (2/12) as stools more formed    Continue lactulose with goal of 2 to 3 soft bowel movement in 24 hours     Paroxysmal atrial fibrillation  Chronic anticoagulation, apixaban  Remains in sinus rhythm, on anticoagulation with apixaban indefinitely for stroke prophylaxis.      Monitor rhythm    Continue apixaban anticoagulation    Monitor hemoglobin, see below     Acute anemia  Pt has required intermittent transfusions for on-going anemia.  Anemia most likely secondary to critical illness/chronic disease, chronic renal disease.  Baseline hemoglobin around 7-8  Likely Epo resistance    Transfuse packed red blood cells to keep hemoglobin > 7    Epoetin lfa-epbx use as per nephrology     History PEA arrest   PEA arrest prior to his previous admission and 1 episode of PEA associated with desaturation on 12/20.  No structural cardiac disease.     Stable, continue cardiac Monitoring     Chronic Hypotension  In the past has developed baseline hypotension with cirrhosis and prolonged critical illness.  On hemodialysis.  Receiving midodrine and albumin during dailysis    Continue midodrine, as per nephrology      History of seizure   After hypoxic event, in the context of baseline multifactorial encephalopathy secondary to his ongoing critical illness and prior cardiac arrests and prior strokes and cirrhosis with hepatic encephalopathy. MRI of head of 12/20/22 reveals no PRES or leptomeningeal enhancement but scattered.  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   No recent seizure activity.    Continue levetiracetam, lacosamide     Seizure precautions    Outpatient follow-up " with neurology, consult inpatient if needed     # ESRD  # Anemia due to renal disease  # Hypotension during dialysis  -Underwent dialysis  on 2/22/23 and was hypotensive and received midodrine and albumin  Receiving albumin with dialysis     3/7: Paged nephrology to discuss hypercalcemia    Diabetes mellitus type 2  *Hemoglobin A1c on November 2022 was 4.7  *By report, on Lantus insulin in the past.  Blood sugar checks and sliding scale insulin previously discontinued as has been stable without insulin needs    Monitor with periodic blood sugar checks as needed     Severe malnutrition, protein and calorie type  Moderate dysphagia  G-tube feedings    Continue with tube feeds via PEG tube    IR replaced the PEG tube on 2/8/2023, monitor    Nutritionist consulted and following for tube feeds    SLP following as well. Oral diet as per speech and language therapy (SLP)     2/20 Nutrition following for tube feeds    Underwent video swallow on 2/23/22     3/1: Change PPI to daily as per family's request    Anxiety  Fluoxetine was discontinued as per psychiatry recommendation on 2/2 (see consult note for details)     Stable, monitor; comfort and reassurance offered during visit    Psychiatry follow-up       Diet: Room Service  Snacks/Supplements Adult: Other; Gelatein+ with brkfst and lunch, and magic cup with dinner (RD); With Meals  Adult Formula Bolus Feeding: Novasource Renal; Route: Gastrostomy; 3 times daily; Volume per Bolus: 240; mL(s); Continue to encourage after meal bolus at 80 mL/hr x 3 hrs, If patient consumes <50% of that meal  Combination Diet Regular Diet; Mildly Thick (level 2) (OK for fresh fruit (e.g., pineapple) per SLP)    DVT Prophylaxis: DOAC  Nixon Catheter: Not present  Lines: PRESENT      CVC Double Lumen Right External jugular Tunneled-Site Assessment: WDL      Cardiac Monitoring: None  Code Status: Full Code      Clinically Significant Risk Factors           # Hypercalcemia: Highest Ca = 10.7  mg/dL in last 2 days, will monitor as appropriate  # Hypomagnesemia: Lowest Mg = 1.5 mg/dL in last 2 days, will replace as needed   # Hypoalbuminemia: Lowest albumin = 1.9 g/dL at 1/23/2023  6:38 AM, will monitor as appropriate   # Thrombocytopenia: Lowest platelets = 108 in last 2 days, will monitor for bleeding          # Severe Malnutrition: based on nutrition assessment        Disposition Plan      Expected Discharge Date: 03/10/2023, 12:00 PM  Discharge Delays: Placement - LTC  Destination: inpatient rehabilitation facility  Discharge Comments: admitted on 1-21 from [facility], Dialysis pt MWF. Will need placement        Pako Tan MD  Hospitalist Service  [facility]  Securely message with [employer] (more info)  Text page via [employer] Paging/Directory   ______________________________________________________________________    Interval History   Tolerated thoracentesis of 3.6 L yesterday  Complains of dizziness on sitting    Increased confusion at night but better this morning as per discussion with his wife  Physical Exam   /69 (BP Location: Left arm)   Pulse 95   Temp 97.5  F (36.4  C) (Axillary)   Resp 18   Wt 79 kg (174 lb 2.6 oz)   SpO2 95%   BMI 23.62 kg/m    Gen- pleasant , sleeping  Neck- supple  CVS- I+II+ ESM  RS- CTAB, decreased at base with few crackles  Abdo- soft, no tenderness. No g/r/r distention ++   Ext-  edema     Medical Decision Making       50 MINUTES SPENT BY ME on the date of service doing chart review, history, exam, documentation & further activities per the note.      Data   ------------------------- PAST 24 HR DATA REVIEWED -----------------------------------------------    I have personally reviewed the following data over the past 24 hrs:    4.6  \   8.8 (L)   / 134 (L)     137 98 46.0 (H) /  114 (H)   4.6 30 (H) 3.91 (H) \       ALT: N/A AST: N/A AP: N/A TBILI: N/A   ALB: 2.6 (L) TOT PROTEIN: N/A LIPASE: N/A       Imaging results reviewed  over the past 24 hrs:   Recent Results (from the past 24 hour(s))   US Paracentesis with Albumin    Narrative    US PARACENTESIS WITH ALBUMIN 3/6/2023 3:22 PM    CLINICAL HISTORY: diagnostic and therapeutic    PROCEDURE: Informed consent obtained. Time out performed. The abdomen  was prepped and draped in a sterile fashion. 10 mL of 1% lidocaine was  infused into local soft tissues. A 5 Nepalese catheter system was  introduced into the abdominal ascites under ultrasound guidance.    3.9 liters of clear fluid were removed and sent to lab if requested.    Patient tolerated procedure well.    Ultrasound imaging was obtained and placed in the patient's permanent  medical record.      Impression    IMPRESSION:  1.  Status post ultrasound-guided paracentesis.    DYANA SANCHEZ MD         SYSTEM ID:  [id number]

## 2023-03-07 NOTE — PROGRESS NOTES
Care Management Follow Up    Length of Stay (days): 45    Expected Discharge Date: 03/10/2023     Concerns to be Addressed: adjustment to diagnosis/illness, care coordination/care conferences, discharge planning     Patient plan of care discussed at interdisciplinary rounds: Yes    Anticipated Discharge Disposition: [facility]ACH     Anticipated Discharge Services:    Anticipated Discharge DME:      Patient/family educated on Medicare website which has current facility and service quality ratings:    Education Provided on the Discharge Plan:    Patient/Family in Agreement with the Plan: yes    Referrals Placed by CM/SW:    Private pay costs discussed: Not applicable    Additional Information:  Sw sent out two more referrals for TCU beds to [facility] based on Pt's [facility] insurance.   SW following for TCU placement.     ADILENE Martin

## 2023-03-07 NOTE — PROVIDER NOTIFICATION
MD Notification    Notified Person: MD    Notified Person Name: Mohsin Salih     Notification Date/Time: 03/06/23 @ 2040, 2nd page sent at 2130 via [employer] web page.    Notification Interaction: [employer] Messaging     Purpose of Notification: Pt's recheck for magnesium protocol was drawn 2 hours earlier than intended, recheck was supposed to be rechecked at 2145, but was checked at 1900. Recheck for Mg was still low at 1.6. Please advise. Thank you.     Orders Received: give mag oxide 400mg. Recheck lab in AM     Comments:

## 2023-03-07 NOTE — PROGRESS NOTES
Pt on a Bipap for~2hrs this shift, see vbg results below;     Before;     Latest Reference Range & Units 03/07/23 10:04   FIO2  25   Ph Venous 7.32 - 7.43  7.29 (L)   PCO2 Venous 40 - 50 mm Hg 66 (H)   PO2 Venous 25 - 47 mm Hg 18 (L)   Bicarbonate Venous 21 - 28 mmol/L 32 (H)   Base Excess Venous -7.7 - 1.9 mmol/L 3.1 (H)   (L): Data is abnormally low  (H): Data is abnormally high     After 2 hrs on a Bipap     Latest Reference Range & Units 03/07/23 16:57   FIO2  25   Ph Venous 7.32 - 7.43  7.33   PCO2 Venous 40 - 50 mm Hg 60 (H)   PO2 Venous 25 - 47 mm Hg 40   Bicarbonate Venous 21 - 28 mmol/L 32 (H)   Base Excess Venous -7.7 - 1.9 mmol/L 4.8 (H)   Oxyhemoglobin Venous 70 - 75 % 63 (L)   (H): Data is abnormally high  (L): Data is abnormally low     Pt wanted the Bipap off family at bedside.Placed on 2L nc SPO2 >90%.Explained the importance of having the Bipap on longer and intermittently to the family and pt agreed to try it later.    Rt will continue to monitor and encourage Bipap as tolerated.    LIANNA JEFF, RRT on 3/7/2023 at 5:56 PM

## 2023-03-07 NOTE — PLAN OF CARE
Goal Outcome Evaluation:      Plan of Care Reviewed With: patient, family    DATE & TIME: 3/6/23 [id number]  Cognitive Concerns/ Orientation: A&O x2. Forgetful.    BEHAVIOR & AGGRESSION TOOL COLOR: green to Yellow,  Restless/agitated, pulling at lines  ABNL VS/O2: VSS on 2L NC  ABNL Labs: PCO2 Venous 64   MOBILITY: Strong Ax2 with mark steady; Repositions self frequently in bed- impulsive, throwing legs over frequently attempting to get up  PAIN MANAGMENT: Denies pain  DIET: Regular diet w mildly thick liquids (level 2) with meds & meals; free thin water between meal.  BOWEL/BLADDER: Incontinent of BM x1  DRAIN/DEVICES: No PIV access, MD aware. Rt external jugular tunneled CVC for hemodialysis. Double lumen PEG tube, clamped.   SKIN: Jaundiced, Scattered scabs and bruising. Blanchable redness to coccyx. abrasion left elbow, Mepilex in place.  TEST/PROCEDURES: Ran dialysis today, Had paracentesis after dialysis  D/C DATE: Pending TCU placement   OTHER IMPORTANT INFO: CO2 elevated- Trial of bipap started @ 1755 through the night- taken off bipap this AM   Restless and agitated at times through out the shift, Zyprexa x1 given.RN aware of bolus tube feeding, Pt was unavailable most of the day due to dialysis and paracentesis. Next nurse will provide meal or bolus feeding if needed.     Mg protocol called for IV replacement, Pt had no IV access and refusing IV placement- MD notified and deferred RN to pharmacy. Per request of MD Pharmacy and RN figured out oral replacement dose. Pt then had fallen asleep, RN then referred to Pharm to see if oral Mg could be crushed. Pharm needed time to look this up and okayed Mg being crushed. Replacement given, awaiting recheck.

## 2023-03-07 NOTE — PROGRESS NOTES
Renal Medicine       Following for ESRD management     Dialyzed 03/06/22  UF 1 liter    Comfortable in bed  Stable 2 lpm O2 requirement  Denies CP or SOB    Plan dialysis 03/08/23  Dialysis orders enter   Pre run CODY Reilly    [facility]  [phone]

## 2023-03-07 NOTE — PLAN OF CARE
Goal Outcome Evaluation:      Plan of Care Reviewed With: patient    Overall Patient Progress: no changeOverall Patient Progress: no change     SUMMARY: pleural effusion, ESRD, metabolic encephalopathy   DATE & TIME: 3/6-03/07 [id number]  Cognitive Concerns/ Orientation: A&O x1- self. Mentation waxes and wanes.    BEHAVIOR & AGGRESSION TOOL COLOR: green.   ABNL VS/O2: VSS on 2L NC  ABNL Labs: PCO2 Venous 64, Mg- 1.6- Md notified that lab was drawn 2 hours earlier than intended. MD- give mag oxide 400mg, recheck in AM.   MOBILITY: Strong Ax2 with mark steady; Repositions self frequently in bed- impulsive at times  PAIN MANAGMENT: Denies pain  DIET: Regular diet w/ mildly thick liquids (level 2) with meds & meals. Orders for bolus TF if pt eating less than 50% of meals.   BOWEL/BLADDER: Incontinent of B/B  DRAIN/DEVICES: No PIV access, MD aware. Rt external jugular tunneled CVC for hemodialysis. Double lumen PEG tube, clamped.   SKIN: Jaundiced, Scattered scabs and bruising. Blanchable redness to coccyx. Abrasion left elbow, Mepilex in place.  TEST/PROCEDURES: Dialysis MWF   D/C DATE: Pending TCU placement   OTHER IMPORTANT INFO: Sitter at bedside. Pt began pulling at tubing and and removing gown, PRN zyprexa given. Restless overnight, continuously trying to get out of bed, continuous need for redirection, melatonin given.

## 2023-03-07 NOTE — PLAN OF CARE
SUMMARY: Pleural effusions, ESRD, metabolic encephalopathy   DATE & TIME: 03/07/23 7-3 pm   Cognitive Concerns/ Orientation: A & O x1- self. Mentation waxes and wanes. Awake all shift. Rambling/illogical speech at times. Very confused and restless. Appears to be hallucinating and paranoid.   BEHAVIOR & AGGRESSION TOOL COLOR: Yellow   ABNL VS/O2: VSS on 2L NC, attempted to wean to RA unable.   ABNL Labs: Carbon dioxide 30, Urea Nitrogen 46, Cr 3.91, Calcium 10.7, Hgb 8.8, Parathyroid hormone 7, and VBG results still abnormal. See results.   MOBILITY: Strong Ax2 with mark steady; Repositions self frequently in bed. Up in recliner x1.   PAIN MANAGMENT: Denied pain this morning. C/O of headache this afternoon, Tylenol x1.   DIET: Regular diet w/ mildly thick liquids (level 2) with meds & meals. Orders for bolus TF if pt eating less than 50% of meals. Ate less than 50% of breakfast. TF bolus x1.   BOWEL/BLADDER: Continent had 2 BMs this shift, Lactulose held per instructions in the MAR.   DRAIN/DEVICES: PIV placed and SL. R chest CVC for hemodialysis. Double lumen PEG tube, clamped.   SKIN: Jaundiced, Scattered scabs and bruising. Blanchable redness to coccyx. Abrasion left elbow, Mepilex in place.  TEST/PROCEDURES: Dialysis MWF. CT of chest completed.   D/C DATE: Pending will need TCU placement when ready.   OTHER IMPORTANT INFO: Sitter at bedside. Pt pulling at tubes and removing gown frequently given. Appears encephalopathic and delirious. RT just placed BiPAP on around 1515 due to CO2 being 66, wants a VBG recheck in one hour. Family supportive and present at bedside all day.

## 2023-03-08 ENCOUNTER — APPOINTMENT (OUTPATIENT)
Dept: CT IMAGING | Facility: CLINIC | Age: 58
DRG: 981 | End: 2023-03-08
Attending: STUDENT IN AN ORGANIZED HEALTH CARE EDUCATION/TRAINING PROGRAM
Payer: COMMERCIAL

## 2023-03-08 ENCOUNTER — APPOINTMENT (OUTPATIENT)
Dept: CARDIOLOGY | Facility: CLINIC | Age: 58
DRG: 981 | End: 2023-03-08
Attending: STUDENT IN AN ORGANIZED HEALTH CARE EDUCATION/TRAINING PROGRAM
Payer: COMMERCIAL

## 2023-03-08 LAB
ALBUMIN SERPL BCG-MCNC: 2.6 G/DL (ref 3.5–5.2)
ALP SERPL-CCNC: 200 U/L (ref 40–129)
ALT SERPL W P-5'-P-CCNC: 6 U/L (ref 10–50)
AMMONIA PLAS-SCNC: 70 UMOL/L (ref 16–60)
ANION GAP SERPL CALCULATED.3IONS-SCNC: 6 MMOL/L (ref 7–15)
AST SERPL W P-5'-P-CCNC: 18 U/L (ref 10–50)
BASE EXCESS BLDV CALC-SCNC: 2.1 MMOL/L (ref -7.7–1.9)
BILIRUB SERPL-MCNC: 0.4 MG/DL
BUN SERPL-MCNC: 31.2 MG/DL (ref 6–20)
CALCIUM SERPL-MCNC: 9.9 MG/DL (ref 8.6–10)
CHLORIDE SERPL-SCNC: 100 MMOL/L (ref 98–107)
CREAT SERPL-MCNC: 2.72 MG/DL (ref 0.67–1.17)
DEPRECATED CALCIDIOL+CALCIFEROL SERPL-MC: 28 UG/L (ref 20–75)
DEPRECATED HCO3 PLAS-SCNC: 29 MMOL/L (ref 22–29)
ERYTHROCYTE [DISTWIDTH] IN BLOOD BY AUTOMATED COUNT: 15.6 % (ref 10–15)
GFR SERPL CREATININE-BSD FRML MDRD: 26 ML/MIN/1.73M2
GLUCOSE SERPL-MCNC: 85 MG/DL (ref 70–99)
HCO3 BLDV-SCNC: 29 MMOL/L (ref 21–28)
HCT VFR BLD AUTO: 27.6 % (ref 40–53)
HGB BLD-MCNC: 8.3 G/DL (ref 13.3–17.7)
MAGNESIUM SERPL-MCNC: 1.5 MG/DL (ref 1.7–2.3)
MCH RBC QN AUTO: 29.7 PG (ref 26.5–33)
MCHC RBC AUTO-ENTMCNC: 30.1 G/DL (ref 31.5–36.5)
MCV RBC AUTO: 99 FL (ref 78–100)
O2/TOTAL GAS SETTING VFR VENT: 21 %
PCO2 BLDV: 56 MM HG (ref 40–50)
PH BLDV: 7.32 [PH] (ref 7.32–7.43)
PLATELET # BLD AUTO: 116 10E3/UL (ref 150–450)
PO2 BLDV: 42 MM HG (ref 25–47)
POTASSIUM SERPL-SCNC: 4.2 MMOL/L (ref 3.4–5.3)
PROT SERPL-MCNC: 6.9 G/DL (ref 6.4–8.3)
RBC # BLD AUTO: 2.79 10E6/UL (ref 4.4–5.9)
SODIUM SERPL-SCNC: 135 MMOL/L (ref 136–145)
WBC # BLD AUTO: 3 10E3/UL (ref 4–11)

## 2023-03-08 PROCEDURE — 93306 TTE W/DOPPLER COMPLETE: CPT | Mod: 26 | Performed by: INTERNAL MEDICINE

## 2023-03-08 PROCEDURE — 99233 SBSQ HOSP IP/OBS HIGH 50: CPT | Performed by: STUDENT IN AN ORGANIZED HEALTH CARE EDUCATION/TRAINING PROGRAM

## 2023-03-08 PROCEDURE — 250N000013 HC RX MED GY IP 250 OP 250 PS 637

## 2023-03-08 PROCEDURE — 94660 CPAP INITIATION&MGMT: CPT

## 2023-03-08 PROCEDURE — 82140 ASSAY OF AMMONIA: CPT | Performed by: STUDENT IN AN ORGANIZED HEALTH CARE EDUCATION/TRAINING PROGRAM

## 2023-03-08 PROCEDURE — 250N000013 HC RX MED GY IP 250 OP 250 PS 637: Performed by: INTERNAL MEDICINE

## 2023-03-08 PROCEDURE — 85027 COMPLETE CBC AUTOMATED: CPT | Performed by: STUDENT IN AN ORGANIZED HEALTH CARE EDUCATION/TRAINING PROGRAM

## 2023-03-08 PROCEDURE — 120N000001 HC R&B MED SURG/OB

## 2023-03-08 PROCEDURE — 80053 COMPREHEN METABOLIC PANEL: CPT | Performed by: STUDENT IN AN ORGANIZED HEALTH CARE EDUCATION/TRAINING PROGRAM

## 2023-03-08 PROCEDURE — 82803 BLOOD GASES ANY COMBINATION: CPT | Performed by: STUDENT IN AN ORGANIZED HEALTH CARE EDUCATION/TRAINING PROGRAM

## 2023-03-08 PROCEDURE — 258N000003 HC RX IP 258 OP 636: Performed by: INTERNAL MEDICINE

## 2023-03-08 PROCEDURE — 90937 HEMODIALYSIS REPEATED EVAL: CPT

## 2023-03-08 PROCEDURE — 70450 CT HEAD/BRAIN W/O DYE: CPT

## 2023-03-08 PROCEDURE — 93306 TTE W/DOPPLER COMPLETE: CPT

## 2023-03-08 PROCEDURE — 99221 1ST HOSP IP/OBS SF/LOW 40: CPT | Performed by: INTERNAL MEDICINE

## 2023-03-08 PROCEDURE — 250N000012 HC RX MED GY IP 250 OP 636 PS 637: Performed by: STUDENT IN AN ORGANIZED HEALTH CARE EDUCATION/TRAINING PROGRAM

## 2023-03-08 PROCEDURE — 999N000157 HC STATISTIC RCP TIME EA 10 MIN

## 2023-03-08 PROCEDURE — 250N000013 HC RX MED GY IP 250 OP 250 PS 637: Performed by: HOSPITALIST

## 2023-03-08 PROCEDURE — 83735 ASSAY OF MAGNESIUM: CPT | Performed by: INTERNAL MEDICINE

## 2023-03-08 PROCEDURE — 99232 SBSQ HOSP IP/OBS MODERATE 35: CPT | Performed by: INTERNAL MEDICINE

## 2023-03-08 PROCEDURE — 250N000013 HC RX MED GY IP 250 OP 250 PS 637: Performed by: STUDENT IN AN ORGANIZED HEALTH CARE EDUCATION/TRAINING PROGRAM

## 2023-03-08 PROCEDURE — 634N000001 HC RX 634: Performed by: INTERNAL MEDICINE

## 2023-03-08 PROCEDURE — 36415 COLL VENOUS BLD VENIPUNCTURE: CPT | Performed by: STUDENT IN AN ORGANIZED HEALTH CARE EDUCATION/TRAINING PROGRAM

## 2023-03-08 RX ADMIN — MIDODRINE HYDROCHLORIDE 10 MG: 5 TABLET ORAL at 10:00

## 2023-03-08 RX ADMIN — FOLIC ACID 1 MG: 1 TABLET ORAL at 11:30

## 2023-03-08 RX ADMIN — WHITE PETROLATUM: 1.75 OINTMENT TOPICAL at 12:45

## 2023-03-08 RX ADMIN — EPOETIN ALFA-EPBX 10000 UNITS: 10000 INJECTION, SOLUTION INTRAVENOUS; SUBCUTANEOUS at 09:47

## 2023-03-08 RX ADMIN — APIXABAN 5 MG: 5 TABLET, FILM COATED ORAL at 20:24

## 2023-03-08 RX ADMIN — GUAIFENESIN 600 MG: 600 TABLET, EXTENDED RELEASE ORAL at 20:24

## 2023-03-08 RX ADMIN — MIDODRINE HYDROCHLORIDE 10 MG: 5 TABLET ORAL at 21:53

## 2023-03-08 RX ADMIN — MIDODRINE HYDROCHLORIDE 10 MG: 5 TABLET ORAL at 12:24

## 2023-03-08 RX ADMIN — LIDOCAINE 1 PATCH: 560 PATCH PERCUTANEOUS; TOPICAL; TRANSDERMAL at 11:31

## 2023-03-08 RX ADMIN — SODIUM CHLORIDE 300 ML: 9 INJECTION, SOLUTION INTRAVENOUS at 09:41

## 2023-03-08 RX ADMIN — SODIUM CHLORIDE 250 ML: 9 INJECTION, SOLUTION INTRAVENOUS at 09:41

## 2023-03-08 RX ADMIN — LACTULOSE 10 G: 10 SOLUTION ORAL at 11:31

## 2023-03-08 RX ADMIN — Medication 1 CAPSULE: at 11:30

## 2023-03-08 RX ADMIN — TACROLIMUS 1 MG: 1 CAPSULE ORAL at 20:24

## 2023-03-08 RX ADMIN — TACROLIMUS 1 MG: 1 CAPSULE ORAL at 11:30

## 2023-03-08 RX ADMIN — LACTULOSE 10 G: 10 SOLUTION ORAL at 20:26

## 2023-03-08 RX ADMIN — RAMELTEON 8 MG: 8 TABLET, FILM COATED ORAL at 20:24

## 2023-03-08 RX ADMIN — GUAIFENESIN 600 MG: 600 TABLET, EXTENDED RELEASE ORAL at 11:30

## 2023-03-08 RX ADMIN — OLANZAPINE 5 MG: 5 TABLET, ORALLY DISINTEGRATING ORAL at 10:00

## 2023-03-08 RX ADMIN — OLANZAPINE 5 MG: 5 TABLET, ORALLY DISINTEGRATING ORAL at 20:24

## 2023-03-08 RX ADMIN — RIFAXIMIN 550 MG: 550 TABLET ORAL at 20:24

## 2023-03-08 RX ADMIN — LACOSAMIDE 100 MG: 100 TABLET, FILM COATED ORAL at 12:24

## 2023-03-08 RX ADMIN — OLANZAPINE 5 MG: 5 TABLET, ORALLY DISINTEGRATING ORAL at 21:52

## 2023-03-08 RX ADMIN — PANTOPRAZOLE SODIUM 40 MG: 40 TABLET, DELAYED RELEASE ORAL at 11:30

## 2023-03-08 RX ADMIN — RIFAXIMIN 550 MG: 550 TABLET ORAL at 11:30

## 2023-03-08 RX ADMIN — LEVETIRACETAM 1000 MG: 500 TABLET, FILM COATED ORAL at 22:07

## 2023-03-08 RX ADMIN — APIXABAN 5 MG: 5 TABLET, FILM COATED ORAL at 11:30

## 2023-03-08 ASSESSMENT — ACTIVITIES OF DAILY LIVING (ADL)
ADLS_ACUITY_SCORE: 62
ADLS_ACUITY_SCORE: 61
ADLS_ACUITY_SCORE: 62
ADLS_ACUITY_SCORE: 58
ADLS_ACUITY_SCORE: 62
ADLS_ACUITY_SCORE: 61
ADLS_ACUITY_SCORE: 62
ADLS_ACUITY_SCORE: 62
ADLS_ACUITY_SCORE: 58
ADLS_ACUITY_SCORE: 60
ADLS_ACUITY_SCORE: 58
ADLS_ACUITY_SCORE: 58

## 2023-03-08 NOTE — CONSULTS
Consult Date: 03/08/2023    Sister was in the room.    This consult has been requested by Dr. sAhkan Hernandez for possible splenic infarct.    Mr. Osborne is a 57-year-old gentleman with complicated past medical history.  He has a history of liver cirrhosis and had a liver transplant in 2016.  He has paroxysmal atrial fibrillation and is on chronic anticoagulation.  He has diabetes, stroke and hypertension.  The patient has renal failure and is on dialysis.  Previously, he had respiratory failure requiring tracheostomy.    The patient had a CT chest done yesterday.  It reveals slight improvement in right pleural effusion.  It revealed a new wedge-shaped area of decreased attenuation in the spleen, suspicious for splenic infarct.    I reviewed his previous CT scan.  On 01/18/2023, his spleen was enlarged measuring 18.3 cm.  No ischemic infarct.    As per the sister, the patient never had any blood clot.  There is no family history of thrombosis.    The patient is restless and encephalopathic.  No meaningful history could be obtained from it.    ALLERGIES:  REVIEWED.    MEDICATIONS:  Reviewed.    PAST MEDICAL HISTORY:    1.  Liver cirrhosis.  2.  Status post liver transplant.  3.  Ascites, requiring paracentesis.  4.  History of alcohol abuse.  5.  Hypertension.  6.  Sleep apnea.  7.  Respiratory failure requiring tracheostomy.  8.  Appendectomy.  9.  Hernia repair.  10.  End-stage renal disease, on dialysis.  11.  Paroxysmal atrial fibrillation.  12.  Anemia.  13.  Seizure.  14.  Diabetes mellitus.    PHYSICAL EXAMINATION:    GENERAL:  The patient is restless.  Not in any pain or respiratory distress.  VITALS:  Reviewed.  Rest of systems not examined.    LABORATORY DATA:  Reviewed.    IMPRESSION:    1.  A 57-year-old gentleman with splenomegaly. There is new wedge-shaped area of decreased attenuation in the spleen, suspicious for splenic infarct.  2.  Pancytopenia.  3.  Multiple other medical problems as described  above.    RECOMMENDATIONS:    1.  CT scan was reviewed with her sister.  There is splenomegaly.  The patient's spleen has been enlarged for quite some time.  This is the result of his liver disease.    Spleen has a new wedge-shaped area of decreased attenuation raising the suspicion of a splenic infarct.  For further evaluation, we will get CT abdomen.    My suspicion for infarct is low as the patient did not have any abdominal pain.  Also, he is already on anticoagulation with Eliquis.  We will wait for CT scan.    2.  Sister had few questions, which were all answered.  Oncology will see him after CT scan.  Case discussed with Dr. Hernandez.    Thanks for the consult.    TOTAL TIME SPENT:  45 minutes.  Time spent in today's visit, review of chart/investigations today, communicating with other providers today and documentation today.    Yovany Smith MD        D: 2023   T: 2023   MT: [id number]    Name:     MARY JO CRAIN  MRN:      [id number]        Account:      [id number]   :      1965           Consult Date: 2023     Document: [id number]

## 2023-03-08 NOTE — PLAN OF CARE
Goal Outcome Evaluation:  SUMMARY: pleural effusion, ESRD, metabolic encephalopathy   DATE & TIME: 3/08/23 8404-9200  Cognitive Concerns/ Orientation: A&O x1- self. Restless at times. Soft spoken, slow to respond   BEHAVIOR & AGGRESSION TOOL COLOR: Yellow  ABNL VS/O2: VSS on 2L NC-sats high 90s%,  bipap on at 2230, kept on all night.  ABNL Labs: None new  MOBILITY: Strong Ax2 with mark steady; Repositions self frequently in bed.   PAIN MANAGMENT: Denied pain; PRN Tylenol given x1 at bedtime per family request.  DIET: Regular diet w/ mildly thick liquids (level 2) with meds & meals. Orders for bolus TF if pt eating less than 50% of meals.   BOWEL/BLADDER: Continent,  No BM this shift, Lactulose given.   DRAIN/DEVICES: PIV SL. R chest CVC for hemodialysis. Double lumen PEG tube, clamped.   SKIN: Jaundiced, Scattered scabs and bruising. Blanchable redness to coccyx. Abrasion left elbow, Mepilex in place.  TEST/PROCEDURES: Dialysis MWF.   D/C DATE: Pending, will need TCU placement when ready.   OTHER IMPORTANT INFO: Sitter at bedside. Pt pulling at tubes and removing gown frequently given. Scheduled Zyprexa given. Mitts placed on bilateral hands due to pt at lines and nasal cannula/ bipap.

## 2023-03-08 NOTE — PLAN OF CARE
Goal Outcome Evaluation:      Plan of Care Reviewed With: patient    SUMMARY: pleural effusion, ESRD, metabolic encephalopathy   DATE & TIME: 03/07/23, 9028-4741  Cognitive Concerns/ Orientation: A & O x1- self. Calm and cooperative. Soft spoken, slow to respond   BEHAVIOR & AGGRESSION TOOL COLOR: green.   ABNL VS/O2: VSS on 2L NC-sats 96-97%, was on BIPAP form 6021-7384 today, CO2 improved, per RT would recommend patient be placed back on BIPAP for bedtime if can tolerate.    ABNL Labs: PCO2=60, Creatinine=3.91, urea nitrogen=46.0  MOBILITY: Strong Ax2 with mark steady; Repositions self frequently in bed. Up in recliner x1. Dangled feet x 2 on the side of the bed  PAIN MANAGMENT: denies  DIET: Regular diet w/ mildly thick liquids (level 2) with meds & meals. Orders for bolus TF if pt eating less than 50% of meals. Tolerated dinner this evening  BOWEL/BLADDER: Continent, had 2 BMs during day shift, Lactulose held per instructions in the MAR.   DRAIN/DEVICES: PIV placed and SL. R chest CVC for hemodialysis. Double lumen PEG tube, clamped.   SKIN: Jaundiced, Scattered scabs and bruising. Blanchable redness to coccyx. Abrasion left elbow, Mepilex in place.  TEST/PROCEDURES: Dialysis MWF. CT of chest completed on days.   D/C DATE: Pending will need TCU placement when ready.   OTHER IMPORTANT INFO: Sitter at bedside. Pt pulling at tubes and removing gown frequently given. Appears encephalopathic and delirious. Family supportive and present at bedside all day.

## 2023-03-08 NOTE — PLAN OF CARE
SUMMARY: pleural effusion, ESRD, metabolic encephalopathy   DATE & TIME: 03/07/23 1900-3/08/23 0730  Cognitive Concerns/ Orientation: A & O x1- self. Restless at times. Soft spoken, slow to respond   BEHAVIOR & AGGRESSION TOOL COLOR: Yellow  ABNL VS/O2: VSS on 2L NC-sats 96-97%,  bipap on at 2230, kept on all night.  ABNL Labs: None new  MOBILITY: Strong Ax2 with mark steady; Repositions self frequently in bed.   PAIN MANAGMENT: Denied pain; PRN Tylenol given x1 at bedtime per family request.  DIET: Regular diet w/ mildly thick liquids (level 2) with meds & meals. Orders for bolus TF if pt eating less than 50% of meals. Tolerated dinner this evening  BOWEL/BLADDER: Continent,  BM x1 this  shift, Lactulose held per instructions in the MAR.   DRAIN/DEVICES: PIV SL. R chest CVC for hemodialysis. Double lumen PEG tube, clamped.   SKIN: Jaundiced, Scattered scabs and bruising. Blanchable redness to coccyx. Abrasion left elbow, Mepilex in place.  TEST/PROCEDURES: Dialysis MWF.   D/C DATE: Pending, will need TCU placement when ready.   OTHER IMPORTANT INFO: Sitter at bedside. Pt pulling at tubes and removing gown frequently given. Scheduled Zyprexa given. Mitts placed on bilateral hands due to pt at lines and nasal cannula/ bipap. Pt slept well this shift.

## 2023-03-08 NOTE — PROGRESS NOTES
[facility]    Medicine Progress Note - Hospitalist Service    Date of Admission:  1/21/2023    Assessment & Plan   Blanco Osborne is a 57 year-old male admitted on 1/21/2023 as a transfer from [facility] for evaluation of loculated pleural effusion. He has extensive PMH including h/o liver transplant 2016 due to alcohol abuse, pAF on chronic anticoagulation, history of diabetes mellitus type 2, CVA, hypertension, CHRISTIAN on BiPAP.  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 He was recovering in [facility] and was transferred to [facility] for consideration of chest tube placement and possible intrapleural lysis for worsening effusion.     Acute metabolic encephalopathy with delirium, multifactorial  Hepatic encephalopathy  Chronic liver disease, history of liver transplant 2016  End-stage renal disease (ESRD), on hemodialysis (HD)  History of seizure, on Keppra and Vimpat  Waxing and waning mentation, coinciding with lactulose being held at [facility]  Earlier in hospital stay, remained confused and had pulled out tracheostomy tube, PICC line and pulled his dialysis catheter.  Lines replaced.  Palliative care consult 1/26, see note.  * Psychiatry consulted 2/2, see note, follow-up requested.    Continue to reorient as needed; maintain normal day/night, sleep wake cycles; minimize sedating/altering medications as able    Continue Lactulose to 10 mg BID, monitor for symptoms to have 2-3 Bms    Ongoing hemodialysis, as per nephrology, on M-W-F dialysis schedule; ongoing nephrology consult follow-up appreciated    2/20/23 Off restraints.Mentation has improved    2/21/23 -Fall this morningWorsening.Delirium. Ramelteon started.Psyciatry consult appreciated  2/22/23 Improved mentation     2/28:  Appreciate psychiatry evaluation: Increased Zyprexa to 5 mg twice daily and continuing 5 mg twice daily as needed.  Also continue Ramelteon 8 mg nightly     3/6, 3,7: Ct increased confusion . Hypercarbia. (respiratory acidosis)  - ct prn Bipap . Will get CT Chest  - Paracentesis to r/o SBP (negative )  - ct lactulose to ensure 3-4 loose stools/ day    3/8 CT head ordered, vbg and ammonia level  Continue prn bipap  Continue lactulose      Hypomagnesemia: replace     Bilateral pleural effusions   Acute respiratory failure requiring tracheostomy  - resolved    Pneumonia  - resolved   ARDS  - resolved    Right pneumothorax - resolved   *Initial event was parainfluenza and strep pneumo pneumonia back in November 2022. Treated for ARDS. Had failed extubation x2 and tracheostomy performed on previous hospitalization. *Had additional complications of bilateral pneumothoraces as well as hydropneumothorax- pleural fluid grew candida parapsilosis  *Completed 4-week antifungal treatment. [facility].  *Recently there were concern for worsening effusion while at [facility] and had thoracentesis 01/13 which returned exudative without growth on cultures. CT chest/abdomen/pelvis on 01/18 demonstrated worsening effusions and concerns for infected parapneumonic effusions with possible SBP.  Multiple pulmonologist at [facility] reviewed CT scan recommended transfer to [facility] for consideration of chest tube placement and possible intrapleural lysis  *Thoracic surgery (Dr. Jackson) consulted during admission   *CT chest 1/22, small effusions on imaging and so chest tube was not inserted.   ID consulted, see note 2/10.  *Patient pulled out his tracheostomy tube on the night 2/1-2/2 and is tolerating well without increased shortness of breath persistent cough or worsening hypoxia  * Chest x-ray 2/3 with cardiomegaly, chronic small bilateral pleural effusions, no pulmonary edema; right internal  jugular dialysis catheter in place    Monitor respiratory status, recently stable on room air    Encourage incentive spirometry as able    Wound care previously consulted for tracheostomy site care, monitor for signs and symptoms of infection        Discontinued nystatin 3/01     3/4,3/5:  chest x-ray:Some increase in opacity at right base likely represents a combination of small right pleural effusion and atelectasis/infiltrate.  Normal WBC count of 5.4  -Encourage incentive spirometer and Acapella.  Monitor closely  3/7: We will get CT chest        # Splenic infract ?  Wedge shaped area on CT scan chest  Hematology consulted  TTE repeated to look for endocarditis  Discussed plan with Dr Smith from Hematology/oncology       S/p liver transplant 2016   Chronic immunosuppression, on tacrolimus  Cirrhosis with ascites  Subacute bacterial peritonitis (SBP), resolved  *Main manifestations of this are hepatic encephalopathy and ascites.  Hepatologist at [place] felt that most likely reason for the cirrhosis was metabolic syndrome or alcohol intake.  There was no evidence of rejection on biopsy and there was some evidence of regeneration. Paracentesis done on 12/22/2022 showed lactobacillus indicating SBP, treated with Unasyn and Augmentin, finished 2-week course 1/12/2023.  Requires large-volume paracentesis periodically for recurring ascites.    *Paracentesis 1/13/2023 yielded about 7500 cc. Cultures NGTD.  Most recent paracentesis on 1/24 with 4.8 L fluid removal    Continue intermittent paracentesis as needed if develops symptomatic ascites    Monitor for signs and symptoms of recurrent spontaneous bacterial peritonitis     Further treatment for recurrent ascites with TIPS deferred to his Liver Transplant team and/or Hepatology, he has an appointment on 4/21/2023 with Hepatology, Dr. Simms    Continue Tacrolimus 1 mg BID, rifaximin 550 mg BID    Continue lactulose as ordered, titrate as needed to have 2-3 bms    GI  "consult as needed in hospital for new acute issues, otherwise as outpatient     3/6:   Goals of care   As per prior rounding provider, \"on 1/25 nursing had mentioned that patient had refused to undergo dialysis and want to stop care, palliative care was consulted.  Patient and his spouse denied wanting to stop care and wanted ongoing restorative care including full code\"    Monitor, Full Code status    -Needs placement to TCU     Diarrhea, improved, on lactulose for chronic liver disease  - C difficile negative on 1/31. Secondary to lactulose.  - discontinued rectal tube (2/12) as stools more formed    Continue lactulose with goal of 2 to 3 soft bowel movement in 24 hours     Paroxysmal atrial fibrillation  Chronic anticoagulation, apixaban  Remains in sinus rhythm, on anticoagulation with apixaban indefinitely for stroke prophylaxis.      Monitor rhythm    Continue apixaban anticoagulation    Monitor hemoglobin, see below     Acute anemia  Pt has required intermittent transfusions for on-going anemia.  Anemia most likely secondary to critical illness/chronic disease, chronic renal disease.  Baseline hemoglobin around 7-8  Likely Epo resistance    Transfuse packed red blood cells to keep hemoglobin > 7    Epoetin lfa-epbx use as per nephrology      # Hypercalcemia  Josue hypercalcemia of immbolity  Discussed with nephrology  Hold VIT D  -Continue to hold Phos lo  Repeat Phosphorus levels in 3-4 days      History PEA arrest   PEA arrest prior to his previous admission and 1 episode of PEA associated with desaturation on 12/20.  No structural cardiac disease.     Stable, continue cardiac Monitoring     Chronic Hypotension  In the past has developed baseline hypotension with cirrhosis and prolonged critical illness.  On hemodialysis.  Receiving midodrine and albumin during dailysis    Continue midodrine, as per nephrology      History of seizure   After hypoxic event, in the context of baseline multifactorial " encephalopathy secondary to his ongoing critical illness and prior cardiac arrests and prior strokes and cirrhosis with hepatic encephalopathy. MRI of head of 12/20/22 reveals no PRES or leptomeningeal enhancement but scattered.  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   No recent seizure activity.    Continue levetiracetam, lacosamide     Seizure precautions    Outpatient follow-up with neurology, consult inpatient if needed     # ESRD  # Anemia due to renal disease  # Hypotension during dialysis  -Underwent dialysis  on 2/22/23 and was hypotensive and received midodrine and albumin  Receiving albumin with dialysis     3/7: Paged nephrology to discuss hypercalcemia     Diabetes mellitus type 2  *Hemoglobin A1c on November 2022 was 4.7  *By report, on Lantus insulin in the past.  Blood sugar checks and sliding scale insulin previously discontinued as has been stable without insulin needs    Monitor with periodic blood sugar checks as needed     Severe malnutrition, protein and calorie type  Moderate dysphagia  G-tube feedings    Continue with tube feeds via PEG tube    IR replaced the PEG tube on 2/8/2023, monitor    Nutritionist consulted and following for tube feeds    SLP following as well. Oral diet as per speech and language therapy (SLP)     2/20 Nutrition following for tube feeds     Underwent video swallow on 2/23/22     3/1: Change PPI to daily as per family's request     Anxiety  Fluoxetine was discontinued as per psychiatry recommendation on 2/2 (see consult note for details)     Stable, monitor; comfort and reassurance offered during visit    Psychiatry follow-up         Diet: Room Service  Snacks/Supplements Adult: Other; Gelatein+ with brkfst and lunch, and magic cup with dinner (RD); With Meals  Adult Formula Bolus Feeding: Novasource Renal; Route: Gastrostomy; 3 times daily; Volume per Bolus: 240; mL(s); Continue to encourage after meal bolus at  80 mL/hr x 3 hrs, If patient consumes <50% of that meal  Combination Diet Regular Diet; Mildly Thick (level 2) (OK for fresh fruit (e.g., pineapple) per SLP)    DVT Prophylaxis: DOAC  Nixon Catheter: Not present  Lines: PRESENT      CVC Double Lumen Right External jugular Tunneled-Site Assessment: WDL      Cardiac Monitoring: None  Code Status: Full Code      Clinically Significant Risk Factors           # Hypercalcemia: Highest Ca = 10.7 mg/dL in last 2 days, will monitor as appropriate  # Hypomagnesemia: Lowest Mg = 1.5 mg/dL in last 2 days, will replace as needed   # Hypoalbuminemia: Lowest albumin = 1.9 g/dL at 1/23/2023  6:38 AM, will monitor as appropriate   # Thrombocytopenia: Lowest platelets = 116 in last 2 days, will monitor for bleeding          # Severe Malnutrition: based on nutrition assessment        Disposition Plan     Expected Discharge Date: 03/10/2023, 12:00 PM  Discharge Delays: Placement - LTC  Destination: inpatient rehabilitation facility  Discharge Comments: admitted on 1-21 from [facility], Dialysis pt MWF. Will need placement        Social Updated Sister at bedside   Ashkan Hernandez MD  Hospitalist Service  [facility]  Securely message with [employer] (more info)  Text page via [employer] Paging/Directory   ______________________________________________________________________    Interval History   Continues to be confused  Does not appear in pain  Sister at bedside   Physical Exam   Vital Signs: Temp: 97.3  F (36.3  C) Temp src: Oral BP: 99/52 Pulse: 71   Resp: 29 SpO2: 95 % O2 Device: None (Room air) Oxygen Delivery: 1 LPM  Weight: 174 lbs 2.61 oz    Physical Exam  Pulmonary:      Effort: Pulmonary effort is normal. No respiratory distress.   Abdominal:      General: There is distension.      Palpations: Abdomen is soft.   Musculoskeletal:      Right lower leg: No edema.      Left lower leg: No edema.   Neurological:      Comments: Confused       Medical  Decision Making     Data     I have personally reviewed the following data over the past 24 hrs:    3.0 (L)  \   8.3 (L)   / 116 (L)     135 (L) 100 31.2 (H) /  85   4.2 29 2.72 (H) \       ALT: 6 (L) AST: 18 AP: 200 (H) TBILI: 0.4   ALB: 2.6 (L) TOT PROTEIN: 6.9 LIPASE: N/A

## 2023-03-08 NOTE — PROGRESS NOTES
Potassium   Date Value Ref Range Status   03/07/2023 4.6 3.4 - 5.3 mmol/L Final   12/29/2022 3.4 3.4 - 5.3 mmol/L Final   04/20/2020 3.9 3.4 - 5.3 mmol/L Final     Potassium POCT   Date Value Ref Range Status   01/19/2023 3.6 3.5 - 5.0 mmol/L Final     Hemoglobin   Date Value Ref Range Status   03/07/2023 8.8 (L) 13.3 - 17.7 g/dL Final   04/20/2020 14.3 13.3 - 17.7 g/dL Final     Creatinine   Date Value Ref Range Status   03/07/2023 3.91 (H) 0.67 - 1.17 mg/dL Final   04/20/2020 1.06 0.66 - 1.25 mg/dL Final     Urea Nitrogen   Date Value Ref Range Status   03/07/2023 46.0 (H) 6.0 - 20.0 mg/dL Final   12/29/2022 46 (H) 7 - 30 mg/dL Final   04/20/2020 23 7 - 30 mg/dL Final     Sodium   Date Value Ref Range Status   03/07/2023 137 136 - 145 mmol/L Final   04/20/2020 139 133 - 144 mmol/L Final     INR   Date Value Ref Range Status   12/21/2022 1.36 (H) 0.85 - 1.15 Final   10/07/2019 1.20 (H) 0.86 - 1.14 Final       DIALYSIS PROCEDURE NOTE  Hepatitis status of previous patient on machine log was checked and verified ok to use with this patients hepatitis status.  Patient dialyzed for 3 hrs. on a K2 bath with a net fluid removal of  1.5L.  A BFR of 250-300 ml/min was obtained via a RIJ catheter.       The treatment plan was discussed with Dr. Carrasquillo during the treatment.    Total heparin received during the treatment: 0 units.   Line flushed, clamped and capped with heparin 1:1000 2 mL (2000 units) per lumen    Meds  given: EPO 10,000units IV and Midodrine 10mg PO    Complications: Arterial collapse with blood lines reversed and then reversed, still blood flow is low rate.      Person educated: pt. Knowledge base minimal. Barriers to learning: confusion will reassess later.    ICEBOAT? Timeout performed pre-treatment  I: Patient was identified using 2 identifiers  C:  Consent Signed Yes  E: Equipment preventative maintenance is current and dialysis delivery system OK to use  B: Hepatitis B Surface Antigen: neg; Draw Date:  3/1/23      Hepatitis B Surface Antibody: unknown;   O: Dialysis orders present and complete prior to treatment  A: Vascular access verified and assessed prior to treatment  T: Treatment was performed at a clinically appropriate time  ?: Patient was allowed to ask questions and address concerns prior to treatment  See Adult Hemodialysis flowsheet in EPIC for further details and post assessment.  Machine water alarm in place and functioning. Transducer pods intact and checked every 15min.   Pt returned via bed.  Chlorine/Chloramine water system checked every 4 hours.  Outpatient Dialysis at [facility]    Patient repositioned every 2 hours during the treatment.  Post treatment report given to GERTRUDE West RN regarding 1.5L of fluid removed, last BP of 104/74, and patient pain rating of 0/10.   .

## 2023-03-08 NOTE — PROGRESS NOTES
Renal Medicine Inpatient Dialysis Note                                Blanco Osborne MRN# [id number]   Age: 57 year old YOB: 1965   Date of Admission: 1/21/2023 Hospital LOS: 46          Assessment/Plan:     Following ARF without recovery/current dialysis dependence      1) End Stage Kidney Disease (CKD and subsequent ATN with non-recovery)    Initially LORETTA, but no renal recovery (anuric/oliguric), now considered ESRD.   Chronic MWF HD via CVC, 3 hr runs.     CVC function much better today.       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  PTH is below target.  Hard to get this to increase.  Vit D also below target.  On Vit D 2000 units daily.  Phos 6.7.  Ca Ac ordered.       2) Anemia:  EPO 20 K units.   persistent anemia despite adequate iron stores and high epo dosing suggesting some epo resistance.      3) Disposition:  Not returning to LTACH. In case not able to go to inpatient rehab, would consider [facility] for TCU/rehab and dialysis. He is not currently a candidate for outpt HD.         MWF schedule  Next 03/10/22    Activase pre run 03/10/22 to CVC       Interval History:     Dialysis run parameters reviewed with dialysis RN at patient bedside.    3 hours  2K  No heparin  1 liter UF     ml/min  Sluggish     BP acceptable but on low side    Lethargic   Hands mitted today        ROS     GENERAL: NAD, No fever,chills  R: NEGATIVE for significant cough or SOB  CV: NEGATIVE for chest pain, palpitations    Dialysis Parameters:     Vitals were reviewed  Patient Vitals for the past 8 hrs:   BP Temp Temp src Pulse Resp SpO2   03/08/23 0945 (!) 89/62 -- -- 84 28 99 %   03/08/23 0930 103/74 -- -- 86 25 97 %   03/08/23 0915 92/63 -- -- 82 26 96 %   03/08/23 0900 98/62 -- -- 82 21 96 %   03/08/23 0845 93/69 -- -- 94 23 --   03/08/23 0830 (!) 87/59 -- -- 91 (!) 37 94 %   03/08/23 0815 (!) 84/57 -- -- 80 24 100 %   03/08/23 0800 118/75 -- -- 77 22 100 %   03/08/23 0750 117/85 -- -- 78 --  --   03/08/23 0745 117/85 97.4  F (36.3  C) Axillary 78 18 99 %   03/08/23 0336 -- -- -- -- -- 100 %   03/08/23 0300 -- -- -- -- -- 100 %   03/08/23 0200 -- -- -- -- -- 99 %     I/O last 3 completed shifts:  In: 1320 [P.O.:610; NG/GT:470]  Out: -     Vitals:    02/17/23 0500 02/22/23 0500 02/27/23 1219 03/06/23 1300   Weight: 87.6 kg (193 lb 2 oz) 82.3 kg (181 lb 7 oz) 84.2 kg (185 lb 10 oz) 85.6 kg (188 lb 11.4 oz)    03/07/23 1021   Weight: 79 kg (174 lb 2.6 oz)       Current Weight: 79  Dry Weight: 78 range   Dialysis Temp: 36.5  C  Access Device: IJ  Access Site: right  Dialyzer: Revaclear  Dialysis Bath: 2  Sodium Profile: n  UF Goal: 1  Blood Flow Rate (mL/min): 350  Total Treatment Time (hrs): 3  Heparin: Low dose as required      EPO dose: y  Zemplar: n  IV Fe: n      Medications and Allergies:     Reviewed      Physical Exam:     Seen and examined during course of dialysis run    BP (!) 89/62   Pulse 84   Temp 97.4  F (36.3  C) (Axillary)   Resp 28   Wt 79 kg (174 lb 2.6 oz)   SpO2 99%   BMI 23.62 kg/m      GENERAL: awake, alert, follows  HEENT: NC/AT, PERRLA, EOMI, non icteric  RESP: clear   CV: RRR, normal S1 S2  ABDOMEN: S/NT, BS present  MS: no edema  SKIN: catheter site clean without drainage  NEURO: speech normal    Data:       Recent Labs   Lab 03/07/23  1004      POTASSIUM 4.6   CHLORIDE 98   CO2 30*   ANIONGAP 9   *   BUN 46.0*   CR 3.91*   GFRESTIMATED 17*   KELLY 10.7*     Recent Labs   Lab 03/07/23  1004 03/05/23  0903 03/04/23  1020 03/02/23  1002   CR 3.91* 4.32* 3.36* 3.46*     Recent Labs   Lab 03/06/23  1253 03/05/23  0903 03/02/23  1002   ALBUMIN 2.6* 2.6* 2.7*     Recent Labs   Lab 03/07/23  1004 03/06/23  1253 03/05/23  1605 03/04/23  1821 03/02/23  1002   PHOS  --  3.5  --   --   --    HGB 8.8*  --  9.3* 9.5* 9.2*         G Jose Reilly MD    [facility] - Nephrology  [phone]

## 2023-03-09 ENCOUNTER — APPOINTMENT (OUTPATIENT)
Dept: PHYSICAL THERAPY | Facility: CLINIC | Age: 58
DRG: 981 | End: 2023-03-09
Attending: STUDENT IN AN ORGANIZED HEALTH CARE EDUCATION/TRAINING PROGRAM
Payer: COMMERCIAL

## 2023-03-09 ENCOUNTER — APPOINTMENT (OUTPATIENT)
Dept: SPEECH THERAPY | Facility: CLINIC | Age: 58
DRG: 981 | End: 2023-03-09
Attending: STUDENT IN AN ORGANIZED HEALTH CARE EDUCATION/TRAINING PROGRAM
Payer: COMMERCIAL

## 2023-03-09 ENCOUNTER — APPOINTMENT (OUTPATIENT)
Dept: CT IMAGING | Facility: CLINIC | Age: 58
DRG: 981 | End: 2023-03-09
Attending: INTERNAL MEDICINE
Payer: COMMERCIAL

## 2023-03-09 LAB
ALBUMIN SERPL BCG-MCNC: 2.5 G/DL (ref 3.5–5.2)
ALP SERPL-CCNC: 195 U/L (ref 40–129)
ALT SERPL W P-5'-P-CCNC: 6 U/L (ref 10–50)
ANION GAP SERPL CALCULATED.3IONS-SCNC: 10 MMOL/L (ref 7–15)
AST SERPL W P-5'-P-CCNC: 17 U/L (ref 10–50)
BILIRUB SERPL-MCNC: 0.4 MG/DL
BUN SERPL-MCNC: 51.5 MG/DL (ref 6–20)
CALCIUM SERPL-MCNC: 10.7 MG/DL (ref 8.6–10)
CHLORIDE SERPL-SCNC: 99 MMOL/L (ref 98–107)
CREAT SERPL-MCNC: 3.84 MG/DL (ref 0.67–1.17)
DEPRECATED HCO3 PLAS-SCNC: 26 MMOL/L (ref 22–29)
ERYTHROCYTE [DISTWIDTH] IN BLOOD BY AUTOMATED COUNT: 15.7 % (ref 10–15)
GFR SERPL CREATININE-BSD FRML MDRD: 17 ML/MIN/1.73M2
GLUCOSE SERPL-MCNC: 100 MG/DL (ref 70–99)
HCT VFR BLD AUTO: 28.3 % (ref 40–53)
HGB BLD-MCNC: 8.5 G/DL (ref 13.3–17.7)
MCH RBC QN AUTO: 29.8 PG (ref 26.5–33)
MCHC RBC AUTO-ENTMCNC: 30 G/DL (ref 31.5–36.5)
MCV RBC AUTO: 99 FL (ref 78–100)
PLATELET # BLD AUTO: 115 10E3/UL (ref 150–450)
POTASSIUM SERPL-SCNC: 4.4 MMOL/L (ref 3.4–5.3)
PROT SERPL-MCNC: 7 G/DL (ref 6.4–8.3)
RBC # BLD AUTO: 2.85 10E6/UL (ref 4.4–5.9)
SODIUM SERPL-SCNC: 135 MMOL/L (ref 136–145)
WBC # BLD AUTO: 3.2 10E3/UL (ref 4–11)

## 2023-03-09 PROCEDURE — 250N000013 HC RX MED GY IP 250 OP 250 PS 637: Performed by: INTERNAL MEDICINE

## 2023-03-09 PROCEDURE — 36415 COLL VENOUS BLD VENIPUNCTURE: CPT | Performed by: INTERNAL MEDICINE

## 2023-03-09 PROCEDURE — 99232 SBSQ HOSP IP/OBS MODERATE 35: CPT | Performed by: INTERNAL MEDICINE

## 2023-03-09 PROCEDURE — 86706 HEP B SURFACE ANTIBODY: CPT | Performed by: INTERNAL MEDICINE

## 2023-03-09 PROCEDURE — 250N000012 HC RX MED GY IP 250 OP 636 PS 637: Performed by: STUDENT IN AN ORGANIZED HEALTH CARE EDUCATION/TRAINING PROGRAM

## 2023-03-09 PROCEDURE — 83519 RIA NONANTIBODY: CPT | Performed by: INTERNAL MEDICINE

## 2023-03-09 PROCEDURE — 74150 CT ABDOMEN W/O CONTRAST: CPT

## 2023-03-09 PROCEDURE — 250N000013 HC RX MED GY IP 250 OP 250 PS 637: Performed by: STUDENT IN AN ORGANIZED HEALTH CARE EDUCATION/TRAINING PROGRAM

## 2023-03-09 PROCEDURE — 120N000001 HC R&B MED SURG/OB

## 2023-03-09 PROCEDURE — 99231 SBSQ HOSP IP/OBS SF/LOW 25: CPT | Performed by: INTERNAL MEDICINE

## 2023-03-09 PROCEDURE — 250N000013 HC RX MED GY IP 250 OP 250 PS 637

## 2023-03-09 PROCEDURE — 80053 COMPREHEN METABOLIC PANEL: CPT | Performed by: STUDENT IN AN ORGANIZED HEALTH CARE EDUCATION/TRAINING PROGRAM

## 2023-03-09 PROCEDURE — 92526 ORAL FUNCTION THERAPY: CPT | Mod: GN | Performed by: REHABILITATION PRACTITIONER

## 2023-03-09 PROCEDURE — 85027 COMPLETE CBC AUTOMATED: CPT | Performed by: STUDENT IN AN ORGANIZED HEALTH CARE EDUCATION/TRAINING PROGRAM

## 2023-03-09 PROCEDURE — 250N000013 HC RX MED GY IP 250 OP 250 PS 637: Performed by: HOSPITALIST

## 2023-03-09 PROCEDURE — 97530 THERAPEUTIC ACTIVITIES: CPT | Mod: GP

## 2023-03-09 PROCEDURE — 36415 COLL VENOUS BLD VENIPUNCTURE: CPT | Performed by: STUDENT IN AN ORGANIZED HEALTH CARE EDUCATION/TRAINING PROGRAM

## 2023-03-09 PROCEDURE — 99232 SBSQ HOSP IP/OBS MODERATE 35: CPT | Performed by: STUDENT IN AN ORGANIZED HEALTH CARE EDUCATION/TRAINING PROGRAM

## 2023-03-09 RX ADMIN — RAMELTEON 8 MG: 8 TABLET, FILM COATED ORAL at 20:18

## 2023-03-09 RX ADMIN — LACOSAMIDE 100 MG: 100 TABLET, FILM COATED ORAL at 11:27

## 2023-03-09 RX ADMIN — WHITE PETROLATUM: 1.75 OINTMENT TOPICAL at 10:08

## 2023-03-09 RX ADMIN — LEVETIRACETAM 1000 MG: 500 TABLET, FILM COATED ORAL at 23:51

## 2023-03-09 RX ADMIN — APIXABAN 5 MG: 5 TABLET, FILM COATED ORAL at 09:44

## 2023-03-09 RX ADMIN — FOLIC ACID 1 MG: 1 TABLET ORAL at 09:44

## 2023-03-09 RX ADMIN — MIDODRINE HYDROCHLORIDE 10 MG: 5 TABLET ORAL at 09:44

## 2023-03-09 RX ADMIN — TACROLIMUS 1 MG: 1 CAPSULE ORAL at 09:44

## 2023-03-09 RX ADMIN — OLANZAPINE 5 MG: 5 TABLET, ORALLY DISINTEGRATING ORAL at 09:44

## 2023-03-09 RX ADMIN — PANTOPRAZOLE SODIUM 40 MG: 40 TABLET, DELAYED RELEASE ORAL at 09:44

## 2023-03-09 RX ADMIN — LIDOCAINE 1 PATCH: 560 PATCH PERCUTANEOUS; TOPICAL; TRANSDERMAL at 09:46

## 2023-03-09 RX ADMIN — TACROLIMUS 1 MG: 1 CAPSULE ORAL at 20:18

## 2023-03-09 RX ADMIN — OLANZAPINE 5 MG: 5 TABLET, ORALLY DISINTEGRATING ORAL at 20:18

## 2023-03-09 RX ADMIN — LACOSAMIDE 100 MG: 100 TABLET, FILM COATED ORAL at 00:47

## 2023-03-09 RX ADMIN — APIXABAN 5 MG: 5 TABLET, FILM COATED ORAL at 20:19

## 2023-03-09 RX ADMIN — GUAIFENESIN 600 MG: 600 TABLET, EXTENDED RELEASE ORAL at 20:18

## 2023-03-09 RX ADMIN — RIFAXIMIN 550 MG: 550 TABLET ORAL at 20:18

## 2023-03-09 RX ADMIN — ACETAMINOPHEN 650 MG: 325 TABLET, FILM COATED ORAL at 23:51

## 2023-03-09 RX ADMIN — GUAIFENESIN 600 MG: 600 TABLET, EXTENDED RELEASE ORAL at 09:43

## 2023-03-09 RX ADMIN — RIFAXIMIN 550 MG: 550 TABLET ORAL at 09:43

## 2023-03-09 RX ADMIN — LACTULOSE 10 G: 10 SOLUTION ORAL at 20:19

## 2023-03-09 RX ADMIN — ACETAMINOPHEN 650 MG: 325 TABLET, FILM COATED ORAL at 11:27

## 2023-03-09 RX ADMIN — Medication 1 CAPSULE: at 09:43

## 2023-03-09 RX ADMIN — LACOSAMIDE 100 MG: 100 TABLET, FILM COATED ORAL at 23:51

## 2023-03-09 RX ADMIN — LACTULOSE 10 G: 10 SOLUTION ORAL at 09:45

## 2023-03-09 RX ADMIN — MIDODRINE HYDROCHLORIDE 10 MG: 5 TABLET ORAL at 18:26

## 2023-03-09 RX ADMIN — MIDODRINE HYDROCHLORIDE 10 MG: 5 TABLET ORAL at 11:28

## 2023-03-09 ASSESSMENT — ACTIVITIES OF DAILY LIVING (ADL)
ADLS_ACUITY_SCORE: 56
ADLS_ACUITY_SCORE: 61
ADLS_ACUITY_SCORE: 63
ADLS_ACUITY_SCORE: 56
ADLS_ACUITY_SCORE: 63
ADLS_ACUITY_SCORE: 57
ADLS_ACUITY_SCORE: 63
ADLS_ACUITY_SCORE: 56
ADLS_ACUITY_SCORE: 62
ADLS_ACUITY_SCORE: 56
ADLS_ACUITY_SCORE: 60
ADLS_ACUITY_SCORE: 61

## 2023-03-09 NOTE — PLAN OF CARE
Goal Outcome Evaluation:         SUMMARY: pleural effusion, ESRD, metabolic encephalopathy   DATE & TIME: 3/08/23 6282-5543  Cognitive Concerns/ Orientation: A&O x1- self. Restless at times. Soft spoken, slow to respond   BEHAVIOR & AGGRESSION TOOL COLOR: Yellow  ABNL VS/O2: VSS on RA refused BIPAP overnight  ABNL Labs: None new  MOBILITY: Strong Ax2 with mark steady; Repositions self frequently in bed.   PAIN MANAGMENT: Denied pain; PRN Tylenol given x1 at bedtime per family request.  DIET: Regular diet w/ mildly thick liquids (level 2) with meds & meals. Orders for bolus TF if pt eating less than 50% of meals.   BOWEL/BLADDER: Continent, medium  BM this shift.   DRAIN/DEVICES: PIV SL. R chest CVC for hemodialysis. Double lumen PEG tube, clamped.   SKIN: Jaundiced, Scattered scabs and bruising. Blanchable redness to coccyx. Abrasion left elbow, Mepilex in place.  TEST/PROCEDURES: Dialysis MWF.   D/C DATE: Pending, will need TCU placement when ready.   OTHER IMPORTANT INFO: Sitter at bedside. Pt pulling at tubes and removing gown frequently given. Scheduled Zyprexa given. Mitts placed on bilateral hands due to pt at lines and nasal cannula/ bipap.

## 2023-03-09 NOTE — PROGRESS NOTES
Renal Medicine Progress Note                                Blanco Osborne MRN# [id number]   Age: 57 year old YOB: 1965   Date of Admission: 1/21/2023 Hospital LOS: 47                  Assessment/Plan:     Following ARF without recovery/current dialysis dependence      1) End Stage Kidney Disease (CKD and subsequent ATN with non-recovery)     Initially LORETTA, but no renal recovery (anuric/oliguric), now considered ESRD.   Chronic MWF HD via CVC, 3 hr runs.     CVC function much better today.       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  PTH is below target.  Hard to get this to increase.  Vit D also below target.  On Vit D 2000 units daily.  Phos 6.7.  Ca Ac ordered.       2) Anemia:  EPO 20 K units.   persistent anemia despite adequate iron stores and high epo dosing suggesting some epo resistance.      3) Disposition:  Not returning to [facility]ACH. In case not able to go to inpatient rehab, would consider [facility] for TCU/rehab and dialysis. He is not currently a candidate for outpt HD.      4)  Hypercalcemia   -corrected in 12 range    -non PTH mediated   -vitamin D 25 total not elevated   -immobilization v other    SPEP/UPEP  PTHrP    Dialysis 03/10/23        Interval History:     Difficult night  Dialyzed yesterday  Limited UF      ROS:     R: NEGATIVE for significant cough or SOB  CV: NEGATIVE for chest pain, palpitations  ROS otherwise negative    Medications and Allergies:     Reviewed    Physical Exam:     Vitals were reviewed  Patient Vitals for the past 8 hrs:   BP Temp Temp src Pulse Resp SpO2   03/09/23 0741 109/76 98.3  F (36.8  C) Oral 79 20 94 %     I/O last 3 completed shifts:  In: 150 [P.O.:150]  Out: 100 [Urine:100]    Vitals:    02/17/23 0500 02/22/23 0500 02/27/23 1219 03/06/23 1300   Weight: 87.6 kg (193 lb 2 oz) 82.3 kg (181 lb 7 oz) 84.2 kg (185 lb 10 oz) 85.6 kg (188 lb 11.4 oz)    03/07/23 1021   Weight: 79 kg (174 lb 2.6 oz)       GENERAL: awake, alert,  follows  HEENT: NC/AT, PERRLA, EOMI, non icteric  RESP:  clear anteriorly  CV: RRR, normal S1 S2    Data:     Recent Labs   Lab 03/09/23  0942 03/08/23  1234 03/07/23  1004 03/05/23  0903   * 135* 137 139   POTASSIUM 4.4 4.2 4.6 5.1   CHLORIDE 99 100 98 100   CO2 26 29 30* 24   ANIONGAP 10 6* 9 15   * 85 114* 108*   BUN 51.5* 31.2* 46.0* 52.2*   CR 3.84* 2.72* 3.91* 4.32*   GFRESTIMATED 17* 26* 17* 15*   KELLY 10.7* 9.9 10.7* 11.0*         Recent Labs   Lab 03/09/23  0942 03/08/23  1234 03/07/23  1004 03/05/23  0903 03/04/23  1020   CR 3.84* 2.72* 3.91* 4.32* 3.36*     Recent Labs   Lab 03/09/23  0942 03/08/23  1234 03/06/23  1253 03/05/23  0903   ALBUMIN 2.5* 2.6* 2.6* 2.6*     Recent Labs   Lab 03/09/23  0942 03/08/23  1234 03/07/23  1004 03/06/23  1253 03/05/23  1605   PHOS  --   --   --  3.5  --    HGB 8.5* 8.3* 8.8*  --  9.3*         G Jose Reilly MD    [facility] - Nephrology  [phone]

## 2023-03-09 NOTE — PLAN OF CARE
"Goal Outcome Evaluation:    A&Ox3; forgetful, impulsive at times. VSS on 2L via O2. Denies pain. Asstx2 w/ mark steady. Incontinent of BM; 2 small BMs during shift. Scheduled lactulose given. Patient ate early supper. Refused TF, states he feels \"more full and it's uncomfortable\". Education provided, patient continued w/ refusal. Easy chew diet w/ mildly thick liquids. Abrasion to lower back; new dsrg applied. Took meds whole. PRN bipap during NOC. Patient becoming restless & pulling at tubes, redirection ineffective; PRN zyprexa given. Had CT of head & abd; see results. VBG & ammonia also drawn; provider aware. Discharge pending.   "

## 2023-03-09 NOTE — PROGRESS NOTES
Care Management Follow Up    Length of Stay (days): 47    Expected Discharge Date: 03/13/2023     Concerns to be Addressed: adjustment to diagnosis/illness, care coordination/care conferences, discharge planning     Patient plan of care discussed at interdisciplinary rounds: Yes    Anticipated Discharge Disposition: TCU     Anticipated Discharge Services:    Anticipated Discharge DME:      Patient/family educated on Medicare website which has current facility and service quality ratings:    Education Provided on the Discharge Plan:    Patient/Family in Agreement with the Plan: yes    Referrals Placed by CM/SW:    Private pay costs discussed: Not applicable    Additional Information:  SW hand faxed referral to [facility] and [facility]. Barriers to placement include medical complexity, [facility] insurance, and an expensive medication. It is possible a [facility] agreement will need to take place for the expensive med. SW will continue to follow    Addendum: [facility] indicated they would be willing to consider patient once off sitter. And requested SW follow up once patient is off sitter.      ADILENE Crowley    [facility]

## 2023-03-09 NOTE — PROGRESS NOTES
Service Date: 2023    SUBJECTIVE:  Wife and sister were in the room.    Mr. Crain is a 57-year-old gentleman with multiple medical problems.  The patient has a history of liver cirrhosis and had liver transplant.  The patient has chronic splenomegaly.  He had a CT chest done yesterday, which was suspicious for a splenic infarct.    For further evaluation, CT abdomen was done today.  It reveals splenomegaly with the spleen measuring 19.3 cm.  There is a small probable infarct superiorly.    The patient is better today.  He is awake and alert and oriented.  Not in any distress or pain.    I spoke to the wife.  The patient never had any blood clot in the past.    ASSESSMENT:    1.  A 57-year-old gentleman with chronic splenomegaly with a small splenic infarct.  2.  Pancytopenia from splenomegaly and multiple other medical problems.    PLAN:    1.  CT abdomen was reviewed with the patient and his family.  He has chronic splenomegaly from his liver disease.  There is a small infarct.    Different causes for splenic infarct discussed.  This is likely the result of the splenomegaly.    The patient is asymptomatic from this infarct.    2.  Discussed regarding management.  I would not recommend any hypercoagulable workup.    The patient is already on Eliquis for cardiac indication.  He is tolerating it well.  No bleeding complication.  He will continue on that.     3.  Wife had a few questions, which were all answered.  Case discussed with Dr. Hernandez.  Hematology/Oncology will sign off.  Please call us with any questions.    Yovany Smith MD        D: 2023   T: 2023   MT: [id number]    Name:     MARY JO CRAIN  MRN:      [id number]        Account:      [id number]   :      1965           Service Date: 2023       Document: [id number]

## 2023-03-09 NOTE — PROGRESS NOTES
CLINICAL NUTRITION SERVICES - REASSESSMENT NOTE    Future/Additional Recommendations:   - continue supplements as ordered  - continue room service w/ assistance  - recommend back up bolus if intake is <50% of meal   Malnutrition:   (1/22)  % Weight Loss:  > 5% in 1 month (severe malnutrition)   % Intake:  </= 50% for >/= 5 days (severe malnutrition)- suspected PTA  Subcutaneous Fat Loss:  Orbital region moderate depletion, Upper arm region moderate-severe depletion and Thoracic region moderate-severe depletion  Muscle Loss:  Temporal region moderate depletion, Clavicle bone region severe depletion, Acromion bone region severe depletion, Patellar region moderate depletion and Anterior thigh region moderate-severe depletion  Fluid Retention:  Trace     Malnutrition Diagnosis: Severe malnutrition  In Context of:  Acute on Chronic illness or disease     EVALUATION OF PROGRESS TOWARD GOALS   Diet: Regular Diet + Mildly Thick  Supplements (Gelatein + Magic cup) w/ meals   Intake/Tolerance:   - Pt is eating well - ordering large meals. Today will receive >4000 kcal, >200 g protein.   - Yesterday didn't eat dinner, but RN notes it was likely because he ate a large late lunch in addition to several magic cup and gelatein supplements.   - Pt hasn't been requiring the TF as he typically eats more than 50%, but last night he refused even though he didn't have dinner. Suspect he is consuming adequate calories/protein most days.   - Labs: Na 135 (L).   - Stooling: BM x2 yesterday. Lactulose titration to 2-3 stools daily.   - Meds reviewed  - Weight trending: down to 79 kg as of 3/7(20# since admission - 10%)    - Pt seen in room with his sisters, d/w bedside RN as well.     ASSESSED NUTRITION NEEDS:  Dosing Weight (2/27) 84.2 kg   Estimated Energy Needs: 3892-8952 kcals (25-30 Kcal/Kg)  Justification: dialysis  Estimated Protein Needs: 100-125 grams protein (1.2-1.5 g pro/Kg)  Justification: dialysis    NEW FINDINGS:   -  discharge pending placement     Previous Goals:   Pt to consume >/= 75% meals and 3 supplements/day  Evaluation: likely met on average with % intake for large meals - pt eats % of meals, often consumes at least 3 supplements daily     Previous Nutrition Diagnosis:   No nutrition diagnosis identified at this time   Evaluation: No change    CURRENT NUTRITION DIAGNOSIS  No nutrition diagnosis identified at this time     INTERVENTIONS  Recommendations / Nutrition Prescription  - continue supplements as ordered  - continue room service w/ assistance  - recommend back up bolus if intake is <50% of meal    Implementation  Collaboration and Referral of Nutrition care: d/w bedside RN and sisters.     Goals  Intake of >/=75% estimated needs in meals + supplements.     MONITORING AND EVALUATION:  Progress towards goals will be monitored and evaluated per protocol and Practice Guidelines    Edna Osuna RD, LD  [facility], [facility], [facility]  Pager: [phone]  Weekend Pager: [phone]

## 2023-03-09 NOTE — PROGRESS NOTES
[facility]    Medicine Progress Note - Hospitalist Service    Date of Admission:  1/21/2023    Assessment & Plan   Blanco Osborne is a 57 year-old male admitted on 1/21/2023 as a transfer from [facility] for evaluation of loculated pleural effusion. He has extensive PMH including h/o liver transplant 2016 due to alcohol abuse, pAF on chronic anticoagulation, history of diabetes mellitus type 2, CVA, hypertension, CHRISTIAN on BiPAP.  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 He was recovering in [facility] and was transferred to [facility] for consideration of chest tube placement and possible intrapleural lysis for worsening effusion.     Acute metabolic encephalopathy with delirium, multifactorial  Hepatic encephalopathy  Chronic liver disease, history of liver transplant 2016  End-stage renal disease (ESRD), on hemodialysis (HD)  History of seizure, on Keppra and Vimpat  Waxing and waning mentation, coinciding with lactulose being held at [facility]  Earlier in hospital stay, remained confused and had pulled out tracheostomy tube, PICC line and pulled his dialysis catheter.  Lines replaced.  Palliative care consult 1/26, see note.  * Psychiatry consulted 2/2, see note, follow-up requested.    Continue to reorient as needed; maintain normal day/night, sleep wake cycles; minimize sedating/altering medications as able    Continue Lactulose to 10 mg BID, monitor for symptoms to have 2-3 Bms    Ongoing hemodialysis, as per nephrology, on M-W-F dialysis schedule; ongoing nephrology consult follow-up appreciated    2/20/23 Off restraints.Mentation has improved    2/21/23 -Fall this morningWorsening.Delirium. Ramelteon started.Psyciatry consult appreciated  2/22/23 Improved mentation     2/28:  Appreciate psychiatry evaluation: Increased Zyprexa to 5 mg twice daily and continuing 5 mg twice daily as needed.  Also continue Ramelteon 8 mg nightly     3/6, 3,7: Ct increased confusion . Hypercarbia. (respiratory acidosis)  - ct prn Bipap . Will get CT Chest  - Paracentesis to r/o SBP (negative )  - ct lactulose to ensure 3-4 loose stools/ day    3/8 CT head ordered, vbg and ammonia level  Continue prn bipap  Continue lactulose   CT HEAD no acute intrcranial abnormality    3/9 -VBG every 48 hours  Continue lactulose to have 2-3 bowel movements  Recheck ammonia level tomorrow  Bipap prn      Hypomagnesemia: replace     Bilateral pleural effusions   Acute respiratory failure requiring tracheostomy  - resolved    Pneumonia  - resolved   ARDS  - resolved    Right pneumothorax - resolved   *Initial event was parainfluenza and strep pneumo pneumonia back in November 2022. Treated for ARDS. Had failed extubation x2 and tracheostomy performed on previous hospitalization. *Had additional complications of bilateral pneumothoraces as well as hydropneumothorax- pleural fluid grew candida parapsilosis  *[facility]. [facility]  *Recently there were concern for worsening effusion while at [facility] and had thoracentesis 01/13 which returned exudative without growth on cultures. CT chest/abdomen/pelvis on 01/18 demonstrated worsening effusions and concerns for infected parapneumonic effusions with possible SBP.  Multiple pulmonologist at [facility] reviewed CT scan recommended transfer to [facility] for consideration of chest tube placement and possible intrapleural lysis  *Thoracic surgery (Dr. Jackson) consulted during admission   *CT chest 1/22, small effusions on imaging and so chest tube was not inserted.   ID consulted, see note 2/10.  *Patient pulled out his tracheostomy tube on the night 2/1-2/2 and is tolerating well without increased shortness of  breath persistent cough or worsening hypoxia  * Chest x-ray 2/3 with cardiomegaly, chronic small bilateral pleural effusions, no pulmonary edema; right internal jugular dialysis catheter in place    Monitor respiratory status, recently stable on room air    Encourage incentive spirometry as able    Wound care previously consulted for tracheostomy site care, monitor for signs and symptoms of infection        Discontinued nystatin 3/01     3/4,3/5:  chest x-ray:Some increase in opacity at right base likely represents a combination of small right pleural effusion and atelectasis/infiltrate.  Normal WBC count of 5.4  -Encourage incentive spirometer and Acapella.  Monitor closely  3/7 CT Chest Slight improvement in right pleural effusion and pleural  thickening since the comparison study.  2.  Similar bilateral infiltrates and minimal left effusion.  3.  New wedge-shaped area of decreased attenuation in the spleen,  question a splenic infarct.      # Splenic infract ?  Wedge shaped area on CT scan chest and CT abdomen  Hematology consulted  Discussed plan with Dr Smith from Hematology/oncology consult appreciated recommended supportive managment  TTE no thrombus or vegetations       S/p liver transplant 2016   Chronic immunosuppression, on tacrolimus  Cirrhosis with ascites  Subacute bacterial peritonitis (SBP), resolved  *Main manifestations of this are hepatic encephalopathy and ascites.  Hepatologist at [place] felt that most likely reason for the cirrhosis was metabolic syndrome or alcohol intake.  There was no evidence of rejection on biopsy and there was some evidence of regeneration. Paracentesis done on 12/22/2022 showed lactobacillus indicating SBP, treated with Unasyn and Augmentin, finished 2-week course 1/12/2023.  Requires large-volume paracentesis periodically for recurring ascites.    *Paracentesis 1/13/2023 yielded about 7500 cc. Cultures NGTD. paracentesis on 1/24 with 4.8 L fluid  "removal  Paracentesis on 3/6/23 No SBP    Continue intermittent paracentesis as needed if develops symptomatic ascites    Monitor for signs and symptoms of recurrent spontaneous bacterial peritonitis     Further treatment for recurrent ascites with TIPS deferred to his Liver Transplant team and/or Hepatology, he has an appointment on 4/21/2023 with Hepatology, Dr. Simms    Continue Tacrolimus 1 mg BID, rifaximin 550 mg BID    Continue lactulose as ordered, titrate as needed to have 2-3 bms    GI consult as needed in hospital for new acute issues, otherwise as outpatient     3/6:   Goals of care   As per prior rounding provider, \"on 1/25 nursing had mentioned that patient had refused to undergo dialysis and want to stop care, palliative care was consulted.  Patient and his spouse denied wanting to stop care and wanted ongoing restorative care including full code\"    Monitor, Full Code status    -Needs placement to TCU     Diarrhea, improved, on lactulose for chronic liver disease  - C difficile negative on 1/31. Secondary to lactulose.  - discontinued rectal tube (2/12) as stools more formed    Continue lactulose with goal of 2 to 3 soft bowel movement in 24 hours     Paroxysmal atrial fibrillation  Chronic anticoagulation, apixaban  Remains in sinus rhythm, on anticoagulation with apixaban indefinitely for stroke prophylaxis.      Monitor rhythm    Continue apixaban anticoagulation    Monitor hemoglobin, see below     Acute anemia  Pt has required intermittent transfusions for on-going anemia.  Anemia most likely secondary to critical illness/chronic disease, chronic renal disease.  Baseline hemoglobin around 7-8  Likely Epo resistance    Transfuse packed red blood cells to keep hemoglobin > 7    Epoetin lfa-epbx use as per nephrology    Hb 8.5 on 3/9/23      # Hypercalcemia  Josue hypercalcemia of immobility  Discussed with nephrology on 3/8/23  Hold VIT D  -Continue to hold Phos lo  Repeat Phosphorus levels in 3-4 " days      History PEA arrest   PEA arrest prior to his previous admission and 1 episode of PEA associated with desaturation on 12/20.  No structural cardiac disease.     Stable, continue cardiac Monitoring     Chronic Hypotension  In the past has developed baseline hypotension with cirrhosis and prolonged critical illness.  On hemodialysis.  Receiving midodrine and albumin during dailysis    Continue midodrine, as per nephrology      History of seizure   After hypoxic event, in the context of baseline multifactorial encephalopathy secondary to his ongoing critical illness and prior cardiac arrests and prior strokes and cirrhosis with hepatic encephalopathy. MRI of head of 12/20/22 reveals no PRES or leptomeningeal enhancement but scattered.  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   No recent seizure activity.    Continue levetiracetam, lacosamide     Seizure precautions    Outpatient follow-up with neurology, consult inpatient if needed     # ESRD  # Anemia due to renal disease  # Hypotension during dialysis  -Underwent dialysis  on 2/22/23 and was hypotensive and received midodrine and albumin  Receiving albumin with dialysis        Diabetes mellitus type 2  *Hemoglobin A1c on November 2022 was 4.7  *By report, on Lantus insulin in the past.  Blood sugar checks and sliding scale insulin previously discontinued as has been stable without insulin needs    Monitor with periodic blood sugar checks as needed     Severe malnutrition, protein and calorie type  Moderate dysphagia  G-tube feedings    Continue with tube feeds via PEG tube    IR replaced the PEG tube on 2/8/2023, monitor    Nutritionist consulted and following for tube feeds    SLP following as well. Oral diet as per speech and language therapy (SLP)     2/20 Nutrition following for tube feeds     Underwent video swallow on 2/23/22     3/1: Change PPI to daily as per family's  request     Anxiety  Fluoxetine was discontinued as per psychiatry recommendation on 2/2 (see consult note for details)     Stable, monitor; comfort and reassurance offered during visit    Psychiatry follow-up         Diet: Room Service  Snacks/Supplements Adult: Other; Gelatein+ with brkfst and lunch, and magic cup with dinner (RD); With Meals  Adult Formula Bolus Feeding: Novasource Renal; Route: Gastrostomy; 3 times daily; Volume per Bolus: 240; mL(s); Continue to encourage after meal bolus at 80 mL/hr x 3 hrs, If patient consumes <50% of that meal  Combination Diet Regular Diet; Mildly Thick (level 2) (OK for fresh fruit (e.g., pineapple) per SLP)    DVT Prophylaxis: DOAC  Nixon Catheter: Not present  Lines: PRESENT      CVC Double Lumen Right External jugular Tunneled-Site Assessment: WDL      Cardiac Monitoring: None  Code Status: Full Code      Clinically Significant Risk Factors           # Hypercalcemia: Highest Ca = 10.7 mg/dL in last 2 days, will monitor as appropriate  # Hypomagnesemia: Lowest Mg = 1.5 mg/dL in last 2 days, will replace as needed   # Hypoalbuminemia: Lowest albumin = 1.9 g/dL at 1/23/2023  6:38 AM, will monitor as appropriate   # Thrombocytopenia: Lowest platelets = 115 in last 2 days, will monitor for bleeding          # Severe Malnutrition: based on nutrition assessment        Disposition Plan      Expected Discharge Date: 03/13/2023, 12:00 PM  Discharge Delays: Placement - LTC  Destination: inpatient rehabilitation facility  Discharge Comments: admitted on 1-21 from [facility], Dialysis pt MWF. Will need placement        Social Updated Sister at bedside   Ashkan Hernandez MD  Hospitalist Service  [facility]  Securely message with Matthieu (more info)  Text page via [facility] Paging/Directory   ______________________________________________________________________    Interval History   Confusion  Improved  Does not Appear in pain  Wife and sister at  bedside    Physical Exam   Vital Signs: Temp: 98.3  F (36.8  C) Temp src: Oral BP: 109/76 Pulse: 79   Resp: 20 SpO2: 94 % O2 Device: None (Room air)    Weight: 174 lbs 2.61 oz    Physical Exam  Constitutional:       Comments: Chronically sick appearing   Pulmonary:      Effort: Pulmonary effort is normal. No respiratory distress.   Abdominal:      General: There is distension.      Palpations: Abdomen is soft.      Tenderness: There is no abdominal tenderness.      Comments: PEG tube +   Musculoskeletal:      Right lower leg: No edema.      Left lower leg: No edema.   Neurological:      Comments: Confused       Medical Decision Making     Data     I have personally reviewed the following data over the past 24 hrs:    3.2 (L)  \   8.5 (L)   / 115 (L)     135 (L) 99 51.5 (H) /  100 (H)   4.4 26 3.84 (H) \       ALT: 6 (L) AST: 17 AP: 195 (H) TBILI: 0.4   ALB: 2.5 (L) TOT PROTEIN: 7.0 LIPASE: N/A

## 2023-03-10 LAB
ALBUMIN SERPL BCG-MCNC: 3.2 G/DL (ref 3.5–5.2)
ALP SERPL-CCNC: 231 U/L (ref 40–129)
ALT SERPL W P-5'-P-CCNC: 9 U/L (ref 10–50)
AMMONIA PLAS-SCNC: 30 UMOL/L (ref 16–60)
ANION GAP SERPL CALCULATED.3IONS-SCNC: 7 MMOL/L (ref 7–15)
AST SERPL W P-5'-P-CCNC: 23 U/L (ref 10–50)
BASE EXCESS BLDV CALC-SCNC: 3.3 MMOL/L (ref -7.7–1.9)
BILIRUB SERPL-MCNC: 0.4 MG/DL
BUN SERPL-MCNC: 27.4 MG/DL (ref 6–20)
CALCIUM SERPL-MCNC: 9.8 MG/DL (ref 8.6–10)
CHLORIDE SERPL-SCNC: 99 MMOL/L (ref 98–107)
CREAT SERPL-MCNC: 2.62 MG/DL (ref 0.67–1.17)
DEPRECATED HCO3 PLAS-SCNC: 27 MMOL/L (ref 22–29)
ERYTHROCYTE [DISTWIDTH] IN BLOOD BY AUTOMATED COUNT: 15.6 % (ref 10–15)
GFR SERPL CREATININE-BSD FRML MDRD: 27 ML/MIN/1.73M2
GLUCOSE SERPL-MCNC: 117 MG/DL (ref 70–99)
HBV SURFACE AB SERPL IA-ACNC: 1.32 M[IU]/ML
HBV SURFACE AB SERPL IA-ACNC: NONREACTIVE M[IU]/ML
HCO3 BLDV-SCNC: 30 MMOL/L (ref 21–28)
HCT VFR BLD AUTO: 32.2 % (ref 40–53)
HGB BLD-MCNC: 9.5 G/DL (ref 13.3–17.7)
MCH RBC QN AUTO: 29.3 PG (ref 26.5–33)
MCHC RBC AUTO-ENTMCNC: 29.5 G/DL (ref 31.5–36.5)
MCV RBC AUTO: 99 FL (ref 78–100)
O2/TOTAL GAS SETTING VFR VENT: 21 %
PCO2 BLDV: 54 MM HG (ref 40–50)
PH BLDV: 7.35 [PH] (ref 7.32–7.43)
PLATELET # BLD AUTO: 146 10E3/UL (ref 150–450)
PO2 BLDV: 19 MM HG (ref 25–47)
POTASSIUM SERPL-SCNC: 4.1 MMOL/L (ref 3.4–5.3)
PROT SERPL-MCNC: 7.6 G/DL (ref 6.4–8.3)
RBC # BLD AUTO: 3.24 10E6/UL (ref 4.4–5.9)
SODIUM SERPL-SCNC: 133 MMOL/L (ref 136–145)
TOTAL PROTEIN SERUM FOR ELP: 7.2 G/DL (ref 6.4–8.3)
WBC # BLD AUTO: 3.7 10E3/UL (ref 4–11)

## 2023-03-10 PROCEDURE — 82140 ASSAY OF AMMONIA: CPT | Performed by: STUDENT IN AN ORGANIZED HEALTH CARE EDUCATION/TRAINING PROGRAM

## 2023-03-10 PROCEDURE — 82542 COL CHROMOTOGRAPHY QUAL/QUAN: CPT | Performed by: INTERNAL MEDICINE

## 2023-03-10 PROCEDURE — 250N000013 HC RX MED GY IP 250 OP 250 PS 637: Performed by: STUDENT IN AN ORGANIZED HEALTH CARE EDUCATION/TRAINING PROGRAM

## 2023-03-10 PROCEDURE — 250N000013 HC RX MED GY IP 250 OP 250 PS 637: Performed by: INTERNAL MEDICINE

## 2023-03-10 PROCEDURE — 84165 PROTEIN E-PHORESIS SERUM: CPT | Mod: 26

## 2023-03-10 PROCEDURE — 250N000013 HC RX MED GY IP 250 OP 250 PS 637

## 2023-03-10 PROCEDURE — 99232 SBSQ HOSP IP/OBS MODERATE 35: CPT | Performed by: STUDENT IN AN ORGANIZED HEALTH CARE EDUCATION/TRAINING PROGRAM

## 2023-03-10 PROCEDURE — 99231 SBSQ HOSP IP/OBS SF/LOW 25: CPT | Performed by: INTERNAL MEDICINE

## 2023-03-10 PROCEDURE — 634N000001 HC RX 634: Performed by: INTERNAL MEDICINE

## 2023-03-10 PROCEDURE — 94660 CPAP INITIATION&MGMT: CPT

## 2023-03-10 PROCEDURE — 250N000013 HC RX MED GY IP 250 OP 250 PS 637: Performed by: HOSPITALIST

## 2023-03-10 PROCEDURE — 90937 HEMODIALYSIS REPEATED EVAL: CPT

## 2023-03-10 PROCEDURE — 82803 BLOOD GASES ANY COMBINATION: CPT | Performed by: STUDENT IN AN ORGANIZED HEALTH CARE EDUCATION/TRAINING PROGRAM

## 2023-03-10 PROCEDURE — 85027 COMPLETE CBC AUTOMATED: CPT | Performed by: STUDENT IN AN ORGANIZED HEALTH CARE EDUCATION/TRAINING PROGRAM

## 2023-03-10 PROCEDURE — 80053 COMPREHEN METABOLIC PANEL: CPT | Performed by: STUDENT IN AN ORGANIZED HEALTH CARE EDUCATION/TRAINING PROGRAM

## 2023-03-10 PROCEDURE — 250N000012 HC RX MED GY IP 250 OP 636 PS 637: Performed by: STUDENT IN AN ORGANIZED HEALTH CARE EDUCATION/TRAINING PROGRAM

## 2023-03-10 PROCEDURE — 999N000157 HC STATISTIC RCP TIME EA 10 MIN

## 2023-03-10 PROCEDURE — 84155 ASSAY OF PROTEIN SERUM: CPT | Performed by: INTERNAL MEDICINE

## 2023-03-10 PROCEDURE — 36415 COLL VENOUS BLD VENIPUNCTURE: CPT | Performed by: INTERNAL MEDICINE

## 2023-03-10 PROCEDURE — 120N000001 HC R&B MED SURG/OB

## 2023-03-10 PROCEDURE — 84165 PROTEIN E-PHORESIS SERUM: CPT | Mod: TC | Performed by: PATHOLOGY

## 2023-03-10 RX ADMIN — FOLIC ACID 1 MG: 1 TABLET ORAL at 13:33

## 2023-03-10 RX ADMIN — EPOETIN ALFA-EPBX 10000 UNITS: 10000 INJECTION, SOLUTION INTRAVENOUS; SUBCUTANEOUS at 11:52

## 2023-03-10 RX ADMIN — APIXABAN 5 MG: 5 TABLET, FILM COATED ORAL at 20:15

## 2023-03-10 RX ADMIN — GUAIFENESIN 600 MG: 600 TABLET, EXTENDED RELEASE ORAL at 13:33

## 2023-03-10 RX ADMIN — LACOSAMIDE 100 MG: 100 TABLET, FILM COATED ORAL at 13:34

## 2023-03-10 RX ADMIN — TACROLIMUS 1 MG: 1 CAPSULE ORAL at 13:33

## 2023-03-10 RX ADMIN — LACTULOSE 10 G: 10 SOLUTION ORAL at 20:15

## 2023-03-10 RX ADMIN — MIDODRINE HYDROCHLORIDE 10 MG: 5 TABLET ORAL at 18:49

## 2023-03-10 RX ADMIN — APIXABAN 5 MG: 5 TABLET, FILM COATED ORAL at 13:33

## 2023-03-10 RX ADMIN — TACROLIMUS 1 MG: 1 CAPSULE ORAL at 20:14

## 2023-03-10 RX ADMIN — LACTULOSE 10 G: 10 SOLUTION ORAL at 13:30

## 2023-03-10 RX ADMIN — RIFAXIMIN 550 MG: 550 TABLET ORAL at 20:14

## 2023-03-10 RX ADMIN — RAMELTEON 8 MG: 8 TABLET, FILM COATED ORAL at 20:15

## 2023-03-10 RX ADMIN — OLANZAPINE 5 MG: 5 TABLET, ORALLY DISINTEGRATING ORAL at 13:33

## 2023-03-10 RX ADMIN — MIDODRINE HYDROCHLORIDE 10 MG: 5 TABLET ORAL at 13:34

## 2023-03-10 RX ADMIN — OLANZAPINE 5 MG: 5 TABLET, ORALLY DISINTEGRATING ORAL at 20:15

## 2023-03-10 RX ADMIN — Medication 1 CAPSULE: at 13:33

## 2023-03-10 RX ADMIN — ACETAMINOPHEN 650 MG: 325 TABLET, FILM COATED ORAL at 15:03

## 2023-03-10 RX ADMIN — GUAIFENESIN 600 MG: 600 TABLET, EXTENDED RELEASE ORAL at 20:14

## 2023-03-10 ASSESSMENT — ACTIVITIES OF DAILY LIVING (ADL)
ADLS_ACUITY_SCORE: 60
ADLS_ACUITY_SCORE: 60
ADLS_ACUITY_SCORE: 58
ADLS_ACUITY_SCORE: 60
ADLS_ACUITY_SCORE: 58
ADLS_ACUITY_SCORE: 60
ADLS_ACUITY_SCORE: 58
ADLS_ACUITY_SCORE: 60
ADLS_ACUITY_SCORE: 58
ADLS_ACUITY_SCORE: 54

## 2023-03-10 NOTE — PLAN OF CARE
Goal Outcome Evaluation:      Plan of Care Reviewed With: patient    SUMMARY: pleural effusion, ESRD, metabolic encephalopathy   DATE & TIME: 3/10/23, [id number]  Cognitive Concerns/ Orientation: A&O x1- self. Restless at times. Soft spoken, slow to respond. Irritable after dialysis   BEHAVIOR & AGGRESSION TOOL COLOR: Green  ABNL VS/O2: VSS on RA. BIPAP at night- did not tolerate overnight per night RN   ABNL Labs: lab contacted about morning labs. Urine sample cancelled per MD.   MOBILITY: Strong Ax2 with mark steady; Repositions self frequently in bed. Refused to get into chair today   PAIN MANAGMENT: Denied pain.  DIET: Regular diet w/ mildly thick liquids (level 2) with meds & meals. Ate well for lunch. No tube feed needed at this time.   BOWEL/BLADDER: x 1 BM today, per MD, need at least 3 Bms by 1700. No urine, on hemodialysis.  DRAIN/DEVICES: PIV SL. R chest CVC for hemodialysis. Double lumen PEG tube, clamped.   SKIN: Jaundiced, Scattered scabs and bruising. Blanchable redness to coccyx. Abrasion left elbow and left neck, Mepilex in place.  TEST/PROCEDURES: Dialysis MWF. Dialysis completed today.  D/C DATE: Pending, will need TCU placement when ready.   OTHER IMPORTANT INFO: Sitter at bedside.

## 2023-03-10 NOTE — PLAN OF CARE
Goal Outcome Evaluation:      Plan of Care Reviewed With: patient  SUMMARY: pleural effusion, ESRD, metabolic encephalopathy   DATE & TIME: 3/09/23 [id number]  Cognitive Concerns/ Orientation: A&O x1- self. Restless at times. Soft spoken, slow to respond. Slept on and off through out the shift.   BEHAVIOR & AGGRESSION TOOL COLOR: Green  ABNL VS/O2: VSS on RA. BIPAP at night.   ABNL Labs: AM labs. ABG labs Q48 hours per provider.   MOBILITY: Strong Ax2 with mark steady; Repositions self frequently in bed. To the chair X3. Not tolerating chair for more than an hour.   PAIN MANAGMENT: Denied pain; PRN Tylenol available at bedtime per family request.  DIET: Regular diet w/ mildly thick liquids (level 2) with meds & meals. Bolus TF administered till 7pm.   BOWEL/BLADDER: Continent, 2 times medium  BM this shift.   DRAIN/DEVICES: PIV SL. R chest CVC for hemodialysis. Double lumen PEG tube, clamped.   SKIN: Jaundiced, Scattered scabs and bruising. Blanchable redness to coccyx. Abrasion left elbow and left neck, Mepilex in place.  TEST/PROCEDURES: Dialysis MWF.   D/C DATE: Pending, will need TCU placement when ready.   OTHER IMPORTANT INFO: Sitter at bedside. Pt compliant this shift. Family in the room most of the shift. Neuro ordered urine sample but pt is on hemodialysis and did not urinate this shift.

## 2023-03-10 NOTE — PLAN OF CARE
Goal Outcome Evaluation:      Plan of Care Reviewed With: patient    Overall Patient Progress: no changeOverall Patient Progress: no change         SUMMARY: pleural effusion, ESRD, metabolic encephalopathy   DATE & TIME: 3/10/23 Night shift  Cognitive Concerns/ Orientation: A&O x1- self. Restless at times. Soft spoken, slow to respond.   BEHAVIOR & AGGRESSION TOOL COLOR: Green  ABNL VS/O2: VSS on RA. BIPAP at night. Patient wears his BIPAP less than 2 hrs and took it off, refusing it back.  ABNL Labs: AM labs, pending result.  MOBILITY: Strong Ax2 with mark steady; Repositions self frequently in bed.  PAIN MANAGMENT: Denied pain.  DIET: Regular diet w/ mildly thick liquids (level 2) with meds & meals.   BOWEL/BLADDER: No bm. No urine, on hemodialysis.  DRAIN/DEVICES: PIV SL. R chest CVC for hemodialysis. Double lumen PEG tube, clamped.   SKIN: Jaundiced, Scattered scabs and bruising. Blanchable redness to coccyx. Abrasion left elbow and left neck, Mepilex in place.  TEST/PROCEDURES: Dialysis MWF. Dialysis today.  D/C DATE: Pending, will need TCU placement when ready.   OTHER IMPORTANT INFO: Sitter at bedside.

## 2023-03-10 NOTE — PLAN OF CARE
Goal Outcome Evaluation:      Plan of Care Reviewed With: patient    Overall Patient Progress: no changeOverall Patient Progress: no change         SUMMARY: pleural effusion, ESRD, metabolic encephalopathy   DATE & TIME: 3/09/23 pm shift  Cognitive Concerns/ Orientation: A&O x1- self. Restless at times. Soft spoken, slow to respond.   BEHAVIOR & AGGRESSION TOOL COLOR: Green  ABNL VS/O2: VSS on RA. BIPAP at night.   ABNL Labs: Na 135, WBC 3.2, Hgb 8.5, Albumin 2.5  MOBILITY: Strong Ax2 with mark steady; Repositions self frequently in bed. Up to the chair X1. Not tolerating chair for more than an hour.   PAIN MANAGMENT: Denied pain; Gave PRN Tylenol available at bedtime per family request.  DIET: Regular diet w/ mildly thick liquids (level 2) with meds & meals.   BOWEL/BLADDER: Continent, 2 times medium  BM this shift.   DRAIN/DEVICES: PIV SL. R chest CVC for hemodialysis. Double lumen PEG tube, clamped.   SKIN: Jaundiced, Scattered scabs and bruising. Blanchable redness to coccyx. Abrasion left elbow and left neck, Mepilex in place.  TEST/PROCEDURES: Dialysis MWF.   D/C DATE: Pending, will need TCU placement when ready.   OTHER IMPORTANT INFO: Sitter at bedside.

## 2023-03-10 NOTE — PROGRESS NOTES
Renal Medicine Inpatient Dialysis Note                                Blanco Osborne MRN# [id number]   Age: 57 year old YOB: 1965   Date of Admission: 1/21/2023 Hospital LOS: 48          Assessment/Plan:     Following ARF without recovery/current dialysis dependence      1) End Stage Kidney Disease (CKD and subsequent ATN with non-recovery)    Initially LORETTA, but no renal recovery (anuric/oliguric), now considered ESRD.   Chronic MWF HD via CVC, 3 hr runs.     CVC function much better today.       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  PTH is below target.  Hard to get this to increase.  Vit D also below target.  On Vit D 2000 units daily.  Phos 6.7.  Ca Ac ordered.       2) Anemia:  EPO 20 K units.   persistent anemia despite adequate iron stores and high epo dosing suggesting some epo resistance.      3) Disposition:  Not returning to [facility]ACH. In case not able to go to inpatient rehab, would consider [facility] for TCU/rehab and dialysis. He is not currently a candidate for outpt HD.      4)  Hypercalcemia              -corrected in 12 range               -non PTH mediated              -vitamin D 25 total not elevated              -immobilization v other   -SPEP/UPEP and PTHrP ordered        MWF schedule  Next 03/13/22        Interval History:     Dialysis run parameters reviewed with dialysis RN at patient bedside.    3 hours  2K  No heparin  1 liter UF     ml/min  No activase used today     BP acceptable but on low side    Lethargic   Limited patient interaction        ROS     GENERAL: NAD, No fever,chills  R: NEGATIVE for significant cough or SOB  CV: NEGATIVE for chest pain, palpitations    Dialysis Parameters:     Vitals were reviewed  Patient Vitals for the past 8 hrs:   BP Temp Temp src Pulse Resp SpO2 Weight   03/10/23 0910 127/75 -- -- 88 18 -- --   03/10/23 0900 126/86 97.5  F (36.4  C) Axillary 86 20 -- --   03/10/23 0759 129/87 97.5  F (36.4  C) Axillary 91 18 92 %  --   03/10/23 0604 -- -- -- -- -- -- 79.4 kg (175 lb 0.7 oz)     I/O last 3 completed shifts:  In: 640 [P.O.:240; NG/GT:160]  Out: -     Vitals:    02/22/23 0500 02/27/23 1219 03/06/23 1300 03/07/23 1021   Weight: 82.3 kg (181 lb 7 oz) 84.2 kg (185 lb 10 oz) 85.6 kg (188 lb 11.4 oz) 79 kg (174 lb 2.6 oz)    03/10/23 0604   Weight: 79.4 kg (175 lb 0.7 oz)       Current Weight: 7904  Dry Weight: 78 range   Dialysis Temp: 36.5  C  Access Device: IJ  Access Site: right  Dialyzer: Revaclear  Dialysis Bath: 2  Sodium Profile: n  UF Goal: 1.5  Blood Flow Rate (mL/min): 350  Total Treatment Time (hrs): 3  Heparin: Low dose as required      EPO dose: y  Zemplar: n  IV Fe: n      Medications and Allergies:     Reviewed      Physical Exam:     Seen and examined during course of dialysis run    /75   Pulse 88   Temp 97.5  F (36.4  C) (Axillary)   Resp 18   Wt 79.4 kg (175 lb 0.7 oz)   SpO2 92%   BMI 23.74 kg/m      GENERAL: awake, alert, follows  HEENT: NC/AT, PERRLA, EOMI, non icteric  RESP: clear   CV: RRR, normal S1 S2  ABDOMEN: S/NT, BS present  MS: no edema  SKIN: catheter site clean without drainage  NEURO: speech normal    Data:       Recent Labs   Lab 03/09/23  0942   *   POTASSIUM 4.4   CHLORIDE 99   CO2 26   ANIONGAP 10   *   BUN 51.5*   CR 3.84*   GFRESTIMATED 17*   KELLY 10.7*     Recent Labs   Lab 03/09/23  0942 03/08/23  1234 03/07/23  1004 03/05/23  0903 03/04/23  1020   CR 3.84* 2.72* 3.91* 4.32* 3.36*     Recent Labs   Lab 03/09/23  0942 03/08/23  1234 03/06/23  1253 03/05/23  0903   ALBUMIN 2.5* 2.6* 2.6* 2.6*     Recent Labs   Lab 03/09/23  0942 03/08/23  1234 03/07/23  1004 03/06/23  1253 03/05/23  1605   PHOS  --   --   --  3.5  --    HGB 8.5* 8.3* 8.8*  --  9.3*         G Jose Reilly MD    [facility] - Nephrology  [phone]

## 2023-03-10 NOTE — PROGRESS NOTES
Potassium   Date Value Ref Range Status   03/09/2023 4.4 3.4 - 5.3 mmol/L Final   12/29/2022 3.4 3.4 - 5.3 mmol/L Final   04/20/2020 3.9 3.4 - 5.3 mmol/L Final     Potassium POCT   Date Value Ref Range Status   01/19/2023 3.6 3.5 - 5.0 mmol/L Final     Hemoglobin   Date Value Ref Range Status   03/09/2023 8.5 (L) 13.3 - 17.7 g/dL Final   04/20/2020 14.3 13.3 - 17.7 g/dL Final     Creatinine   Date Value Ref Range Status   03/09/2023 3.84 (H) 0.67 - 1.17 mg/dL Final   04/20/2020 1.06 0.66 - 1.25 mg/dL Final     Urea Nitrogen   Date Value Ref Range Status   03/09/2023 51.5 (H) 6.0 - 20.0 mg/dL Final   12/29/2022 46 (H) 7 - 30 mg/dL Final   04/20/2020 23 7 - 30 mg/dL Final     Sodium   Date Value Ref Range Status   03/09/2023 135 (L) 136 - 145 mmol/L Final   04/20/2020 139 133 - 144 mmol/L Final     INR   Date Value Ref Range Status   12/21/2022 1.36 (H) 0.85 - 1.15 Final   10/07/2019 1.20 (H) 0.86 - 1.14 Final       DIALYSIS PROCEDURE NOTE  Hepatitis status of previous patient on machine log was checked and verified ok to use with this patients hepatitis status.  Patient dialyzed for 3 hrs. on a K3 bath with a net fluid removal of  1.5L.  A BFR of 400 ml/min was obtained via a RIJ catheter.       The treatment plan was discussed with Dr. Carrasquillo during the treatment.    Total heparin received during the treatment: 0 units.   Line flushed, clamped and capped with heparin 1:1000 2 mL (2000 units) per lumen     Meds  given: EPO 10,000units IV    Complications: None, tx was tolerated well     Person educated: pt. Knowledge base minimal. Barriers to learning: confusion will reassess later.     ICEBOAT? Timeout performed pre-treatment  I: Patient was identified using 2 identifiers  C:  Consent Signed Yes  E: Equipment preventative maintenance is current and dialysis delivery system OK to use  B: Hepatitis B Surface Antigen: neg; Draw Date: 3/1/23      Hepatitis B Surface Antibody: unknown;   O: Dialysis orders present and  complete prior to treatment  A: Vascular access verified and assessed prior to treatment  T: Treatment was performed at a clinically appropriate time  ?: Patient was allowed to ask questions and address concerns prior to treatment  See Adult Hemodialysis flowsheet in [facility] for further details and post assessment.  Machine water alarm in place and functioning. Transducer pods intact and checked every 15min.   Pt returned via bed.  Chlorine/Chloramine water system checked every 4 hours.  Outpatient Dialysis at [facility]     Patient repositioned every 2 hours during the treatment.  Post treatment report given to MICHEL Wolf RN regarding 1.5L of fluid removed, last BP of 100/63, and patient pain rating of 0/10.

## 2023-03-10 NOTE — PROGRESS NOTES
[facility]    Medicine Progress Note - Hospitalist Service    Date of Admission:  1/21/2023    Assessment & Plan   Blanco Osborne is a 57 year-old male admitted on 1/21/2023 as a transfer from [facility] for evaluation of loculated pleural effusion. He has extensive PMH including h/o liver transplant 2016 due to alcohol abuse, pAF on chronic anticoagulation, history of diabetes mellitus type 2, CVA, hypertension, CHRISTIAN on BiPAP.  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 He was recovering in [facility] and was transferred to [facility] for consideration of chest tube placement and possible intrapleural lysis for worsening effusion.     Acute metabolic encephalopathy with delirium, multifactorial  Hepatic encephalopathy  Chronic liver disease, history of liver transplant 2016  End-stage renal disease (ESRD), on hemodialysis (HD)  History of seizure, on Keppra and Vimpat  Waxing and waning mentation, coinciding with lactulose being held at [facility]  Earlier in hospital stay, remained confused and had pulled out tracheostomy tube, PICC line and pulled his dialysis catheter.  Lines replaced.  Palliative care consult 1/26, see note.  * Psychiatry consulted 2/2, see note, follow-up requested.    Continue to reorient as needed; maintain normal day/night, sleep wake cycles; minimize sedating/altering medications as able    Continue Lactulose to 10 mg BID, monitor for symptoms to have 2-3 Bms    Ongoing hemodialysis, as per nephrology, on M-W-F dialysis schedule; ongoing nephrology consult follow-up appreciated    2/20/23 Off restraints.Mentation has improved    2/21/23 -Fall this morningWorsening.Delirium. Ramelteon started.Psyciatry consult appreciated  2/22/23 Improved mentation     2/28:  Appreciate psychiatry evaluation: Increased Zyprexa to 5 mg twice daily and continuing 5 mg twice daily as needed.  Also continue Ramelteon 8 mg nightly     3/6, 3,7: Ct increased confusion . Hypercarbia. (respiratory acidosis)  - ct prn Bipap . Will get CT Chest  - Paracentesis to r/o SBP (negative )  - ct lactulose to ensure 3-4 loose stools/ day    3/8 CT head ordered, vbg and ammonia level  Continue prn bipap  Continue lactulose   CT HEAD no acute intrcranial abnormality    3/9 -VBG every 48 hours  Continue lactulose to have 2-3 bowel movements  Recheck ammonia level tomorrow  Bipap prn      3/10 VBG reviewed Ph wnl   Ammonial level 30    CBC and Bmp reviwed        Hypomagnesemia: On reolacement protocol     Bilateral pleural effusions   Acute respiratory failure requiring tracheostomy  - resolved    Pneumonia  - resolved   ARDS  - resolved    Right pneumothorax - resolved   *Initial event was parainfluenza and strep pneumo pneumonia back in November 2022. Treated for ARDS. Had failed extubation x2 and tracheostomy performed on previous hospitalization. *Had additional complications of bilateral pneumothoraces as well as hydropneumothorax- pleural fluid grew candida parapsilosis  *Completed 4-week antifungal treatment. While in LTACH he was successfully weaned from the ventilator.  *Recently there were concern for worsening effusion while at [facility] and had thoracentesis 01/13 which returned exudative without growth on cultures. CT chest/abdomen/pelvis on 01/18 demonstrated worsening effusions and concerns for infected parapneumonic effusions with possible SBP.  Multiple pulmonologist at [facility] reviewed CT scan recommended transfer to [facility] for consideration of chest tube placement and possible intrapleural lysis  *Thoracic surgery (Dr. Jackson) consulted during admission   *CT chest 1/22, small effusions on imaging and so chest tube was not inserted.   ID consulted, see note 2/10.  *Patient pulled out his  tracheostomy tube on the night 2/1-2/2 and is tolerating well without increased shortness of breath persistent cough or worsening hypoxia  * Chest x-ray 2/3 with cardiomegaly, chronic small bilateral pleural effusions, no pulmonary edema; right internal jugular dialysis catheter in place    Monitor respiratory status, recently stable on room air    Encourage incentive spirometry as able    Wound care previously consulted for tracheostomy site care, monitor for signs and symptoms of infection        Discontinued nystatin 3/01     3/4,3/5:  chest x-ray:Some increase in opacity at right base likely represents a combination of small right pleural effusion and atelectasis/infiltrate.  Normal WBC count of 5.4  -Encourage incentive spirometer and Acapella.  Monitor closely  3/7 CT Chest Slight improvement in right pleural effusion and pleural  thickening since the comparison study.  2.  Similar bilateral infiltrates and minimal left effusion.  3.  New wedge-shaped area of decreased attenuation in the spleen,  question a splenic infarct.      # Splenic infract ?  Wedge shaped area on CT scan chest and CT abdomen  Hematology consulted  Discussed plan with Dr Smith on 3/9 from Hematology/oncology consult appreciated recommended supportive managment  TTE no thrombus or vegetations       S/p liver transplant 2016   Chronic immunosuppression, on tacrolimus  Cirrhosis with ascites  Subacute bacterial peritonitis (SBP), resolved  *Main manifestations of this are hepatic encephalopathy and ascites.  Hepatologist at [facility] felt that most likely reason for the cirrhosis was metabolic syndrome or alcohol intake.  There was no evidence of rejection on biopsy and there was some evidence of regeneration. Paracentesis done on 12/22/2022 showed lactobacillus indicating SBP, treated with Unasyn and Augmentin, finished 2-week course 1/12/2023.  Requires large-volume paracentesis periodically for recurring ascites.    *Paracentesis  "1/13/2023 yielded about 7500 cc. Cultures NGTD. paracentesis on 1/24 with 4.8 L fluid removal  Paracentesis on 3/6/23 No SBP    Continue intermittent paracentesis as needed if develops symptomatic ascites    Monitor for signs and symptoms of recurrent spontaneous bacterial peritonitis     Further treatment for recurrent ascites with TIPS deferred to his Liver Transplant team and/or Hepatology, he has an appointment on 4/21/2023 with Hepatology, Dr. Smims    Continue Tacrolimus 1 mg BID, rifaximin 550 mg BID    Continue lactulose as ordered, titrate as needed to have 2-3 bms    GI consult as needed in hospital for new acute issues, otherwise as outpatient     3/6:   Goals of care   As per prior rounding provider, \"on 1/25 nursing had mentioned that patient had refused to undergo dialysis and want to stop care, palliative care was consulted.  Patient and his spouse denied wanting to stop care and wanted ongoing restorative care including full code\"    Monitor, Full Code status    -Needs placement to TCU     Diarrhea, improved, on lactulose for chronic liver disease  - C difficile negative on 1/31. Secondary to lactulose.  - discontinued rectal tube (2/12) as stools more formed    Continue lactulose with goal of 2 to 3 soft bowel movement in 24 hours     Paroxysmal atrial fibrillation  Chronic anticoagulation, apixaban  Remains in sinus rhythm, on anticoagulation with apixaban indefinitely for stroke prophylaxis.      Monitor rhythm    Continue apixaban anticoagulation    Monitor hemoglobin, see below     Acute anemia  Pt has required intermittent transfusions for on-going anemia.  Anemia most likely secondary to critical illness/chronic disease, chronic renal disease.  Baseline hemoglobin around 7-8  Likely Epo resistance    Transfuse packed red blood cells to keep hemoglobin > 7    Epoetin lfa-epbx use as per nephrology    Hb 8.5 on 3/9/23    Hb 9.5 today      # Hypercalcemia  Josue hypercalcemia of " Immobility  Discussed with nephrology on 3/8/23  Hold VIT D  -Continue to hold Phos lo  Repeat Phosphorus levels in 3-4 days      History PEA arrest   PEA arrest prior to his previous admission and 1 episode of PEA associated with desaturation on 12/20.  No structural cardiac disease.     Stable, continue cardiac Monitoring     Chronic Hypotension  In the past has developed baseline hypotension with cirrhosis and prolonged critical illness.  On hemodialysis.  Receiving midodrine and albumin during dailysis    Continue midodrine, as per nephrology      History of seizure   After hypoxic event, in the context of baseline multifactorial encephalopathy secondary to his ongoing critical illness and prior cardiac arrests and prior strokes and cirrhosis with hepatic encephalopathy. MRI of head of 12/20/22 reveals no PRES or leptomeningeal enhancement but scattered.  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   No recent seizure activity.    Continue levetiracetam, lacosamide     Seizure precautions    Outpatient follow-up with neurology, consult inpatient if needed     # ESRD  # Anemia due to renal disease  # Hypotension during dialysis  -Underwent dialysis  on 2/22/23 and was hypotensive and received midodrine and albumin  Receiving albumin with dialysis        Diabetes mellitus type 2  *Hemoglobin A1c on November 2022 was 4.7  *By report, on Lantus insulin in the past.  Blood sugar checks and sliding scale insulin previously discontinued as has been stable without insulin needs    Monitor with periodic blood sugar checks as needed     Severe malnutrition, protein and calorie type  Moderate dysphagia  G-tube feedings    Continue with tube feeds via PEG tube    IR replaced the PEG tube on 2/8/2023, monitor    Nutritionist consulted and following for tube feeds    SLP following as well. Oral diet as per speech and language therapy (SLP)     2/20 Nutrition following for  tube feeds     Underwent video swallow on 2/23/22     3/1: Change PPI to daily as per family's request     Anxiety  Fluoxetine was discontinued as per psychiatry recommendation on 2/2 (see consult note for details)     Stable, monitor; comfort and reassurance offered during visit    Psychiatry follow-up         Diet: Room Service  Snacks/Supplements Adult: Other; Gelatein+ with brkfst and lunch, and magic cup with dinner (RD); With Meals  Adult Formula Bolus Feeding: Novasource Renal; Route: Gastrostomy; 3 times daily; Volume per Bolus: 240; mL(s); Continue to encourage after meal bolus at 80 mL/hr x 3 hrs, If patient consumes <50% of that meal  Combination Diet Regular Diet; Mildly Thick (level 2) (OK for fresh fruit (e.g., pineapple) per SLP)    DVT Prophylaxis: DOAC  Nixon Catheter: Not present  Lines: PRESENT      CVC Double Lumen Right External jugular Tunneled-Site Assessment: WDL      Cardiac Monitoring: None  Code Status: Full Code      Clinically Significant Risk Factors           # Hypercalcemia: Highest Ca = 10.7 mg/dL in last 2 days, will monitor as appropriate    # Hypoalbuminemia: Lowest albumin = 1.9 g/dL at 1/23/2023  6:38 AM, will monitor as appropriate   # Thrombocytopenia: Lowest platelets = 115 in last 2 days, will monitor for bleeding          # Severe Malnutrition: based on nutrition assessment        Disposition Plan     Expected Discharge Date: 03/13/2023, 12:00 PM  Discharge Delays: Placement - LTC  Destination: inpatient rehabilitation facility  Discharge Comments: [facility] Dialysis [facility] MWF. Will need placement          Ashkan Hernandez MD  Hospitalist Service  [facility]  Securely message with [employer] (more info)  Text page via [employer] Paging/Directory   ______________________________________________________________________    Interval History   Confusion  Has improved  More engnaged in conversation  Physical Exam   Vital Signs:  Temp: 97.6  F (36.4  C) Temp src: Axillary BP: 100/63 Pulse: 90   Resp: 18 SpO2: 92 % O2 Device: None (Room air)    Weight: 175 lbs .72 oz    Physical Exam  Constitutional:       Comments: Chronically sick appearing   Pulmonary:      Effort: Pulmonary effort is normal. No respiratory distress.   Abdominal:      General: There is distension.      Palpations: Abdomen is soft.      Tenderness: There is no abdominal tenderness.   Musculoskeletal:      Right lower leg: No edema.      Left lower leg: No edema.   Neurological:      Comments: Confused  More enganged in conversation       Medical Decision Making     Data     I have personally reviewed the following data over the past 24 hrs:    3.7 (L)  \   9.5 (L)   / 146 (L)     133 (L) 99 27.4 (H) /  117 (H)   4.1 27 2.62 (H) \       ALT: 9 (L) AST: 23 AP: 231 (H) TBILI: 0.4   ALB: 3.2 (L) TOT PROTEIN: 7.6 LIPASE: N/A

## 2023-03-10 NOTE — PROVIDER NOTIFICATION
MD Notification    Notified Person: MD    Notified Person Name: Mary     Notification Date/Time: 3/10/2023, 1440     Notification Interaction: [email or web address]     Purpose of Notification: Hi, there is an order to collect a urine sample, however patient produces very little urine. Are you still wanting a sample at this time? Thanks!     Orders Received: can cancel order    Comments:  -lab notified about morning labs still needing to be collected

## 2023-03-11 ENCOUNTER — APPOINTMENT (OUTPATIENT)
Dept: PHYSICAL THERAPY | Facility: CLINIC | Age: 58
DRG: 981 | End: 2023-03-11
Attending: STUDENT IN AN ORGANIZED HEALTH CARE EDUCATION/TRAINING PROGRAM
Payer: COMMERCIAL

## 2023-03-11 ENCOUNTER — APPOINTMENT (OUTPATIENT)
Dept: SPEECH THERAPY | Facility: CLINIC | Age: 58
DRG: 981 | End: 2023-03-11
Attending: STUDENT IN AN ORGANIZED HEALTH CARE EDUCATION/TRAINING PROGRAM
Payer: COMMERCIAL

## 2023-03-11 LAB
ACID FAST STAIN (ARUP): NORMAL
ALBUMIN SERPL BCG-MCNC: 2.9 G/DL (ref 3.5–5.2)
ALP SERPL-CCNC: 224 U/L (ref 40–129)
ALT SERPL W P-5'-P-CCNC: 8 U/L (ref 10–50)
ANION GAP SERPL CALCULATED.3IONS-SCNC: 12 MMOL/L (ref 7–15)
AST SERPL W P-5'-P-CCNC: 23 U/L (ref 10–50)
BACTERIA FLD CULT: NO GROWTH
BILIRUB SERPL-MCNC: 0.4 MG/DL
BUN SERPL-MCNC: 41 MG/DL (ref 6–20)
CALCIUM SERPL-MCNC: 10.9 MG/DL (ref 8.6–10)
CHLORIDE SERPL-SCNC: 99 MMOL/L (ref 98–107)
CREAT SERPL-MCNC: 3.45 MG/DL (ref 0.67–1.17)
DEPRECATED HCO3 PLAS-SCNC: 25 MMOL/L (ref 22–29)
ERYTHROCYTE [DISTWIDTH] IN BLOOD BY AUTOMATED COUNT: 15.7 % (ref 10–15)
GFR SERPL CREATININE-BSD FRML MDRD: 20 ML/MIN/1.73M2
GLUCOSE SERPL-MCNC: 91 MG/DL (ref 70–99)
HCT VFR BLD AUTO: 32.3 % (ref 40–53)
HGB BLD-MCNC: 9.6 G/DL (ref 13.3–17.7)
MAGNESIUM SERPL-MCNC: 1.7 MG/DL (ref 1.7–2.3)
MCH RBC QN AUTO: 29.7 PG (ref 26.5–33)
MCHC RBC AUTO-ENTMCNC: 29.7 G/DL (ref 31.5–36.5)
MCV RBC AUTO: 100 FL (ref 78–100)
PLATELET # BLD AUTO: 138 10E3/UL (ref 150–450)
POTASSIUM SERPL-SCNC: 4.3 MMOL/L (ref 3.4–5.3)
PROT SERPL-MCNC: 7.6 G/DL (ref 6.4–8.3)
RBC # BLD AUTO: 3.23 10E6/UL (ref 4.4–5.9)
SODIUM SERPL-SCNC: 136 MMOL/L (ref 136–145)
WBC # BLD AUTO: 4 10E3/UL (ref 4–11)

## 2023-03-11 PROCEDURE — 99232 SBSQ HOSP IP/OBS MODERATE 35: CPT | Performed by: INTERNAL MEDICINE

## 2023-03-11 PROCEDURE — 97110 THERAPEUTIC EXERCISES: CPT | Mod: GP

## 2023-03-11 PROCEDURE — 250N000012 HC RX MED GY IP 250 OP 636 PS 637: Performed by: STUDENT IN AN ORGANIZED HEALTH CARE EDUCATION/TRAINING PROGRAM

## 2023-03-11 PROCEDURE — 83735 ASSAY OF MAGNESIUM: CPT | Performed by: STUDENT IN AN ORGANIZED HEALTH CARE EDUCATION/TRAINING PROGRAM

## 2023-03-11 PROCEDURE — 97530 THERAPEUTIC ACTIVITIES: CPT | Mod: GP

## 2023-03-11 PROCEDURE — 250N000013 HC RX MED GY IP 250 OP 250 PS 637

## 2023-03-11 PROCEDURE — 250N000013 HC RX MED GY IP 250 OP 250 PS 637: Performed by: INTERNAL MEDICINE

## 2023-03-11 PROCEDURE — 99232 SBSQ HOSP IP/OBS MODERATE 35: CPT | Performed by: STUDENT IN AN ORGANIZED HEALTH CARE EDUCATION/TRAINING PROGRAM

## 2023-03-11 PROCEDURE — 250N000013 HC RX MED GY IP 250 OP 250 PS 637: Performed by: STUDENT IN AN ORGANIZED HEALTH CARE EDUCATION/TRAINING PROGRAM

## 2023-03-11 PROCEDURE — 85014 HEMATOCRIT: CPT | Performed by: STUDENT IN AN ORGANIZED HEALTH CARE EDUCATION/TRAINING PROGRAM

## 2023-03-11 PROCEDURE — 92526 ORAL FUNCTION THERAPY: CPT | Mod: GN | Performed by: SPEECH-LANGUAGE PATHOLOGIST

## 2023-03-11 PROCEDURE — 120N000001 HC R&B MED SURG/OB

## 2023-03-11 PROCEDURE — 250N000013 HC RX MED GY IP 250 OP 250 PS 637: Performed by: HOSPITALIST

## 2023-03-11 PROCEDURE — 36415 COLL VENOUS BLD VENIPUNCTURE: CPT | Performed by: STUDENT IN AN ORGANIZED HEALTH CARE EDUCATION/TRAINING PROGRAM

## 2023-03-11 PROCEDURE — 80053 COMPREHEN METABOLIC PANEL: CPT | Performed by: STUDENT IN AN ORGANIZED HEALTH CARE EDUCATION/TRAINING PROGRAM

## 2023-03-11 RX ADMIN — TACROLIMUS 1 MG: 1 CAPSULE ORAL at 08:27

## 2023-03-11 RX ADMIN — PANTOPRAZOLE SODIUM 40 MG: 40 TABLET, DELAYED RELEASE ORAL at 08:27

## 2023-03-11 RX ADMIN — WHITE PETROLATUM: 1.75 OINTMENT TOPICAL at 08:35

## 2023-03-11 RX ADMIN — MIDODRINE HYDROCHLORIDE 10 MG: 5 TABLET ORAL at 12:10

## 2023-03-11 RX ADMIN — LACTULOSE 10 G: 10 SOLUTION ORAL at 21:32

## 2023-03-11 RX ADMIN — APIXABAN 5 MG: 5 TABLET, FILM COATED ORAL at 21:31

## 2023-03-11 RX ADMIN — LEVETIRACETAM 1000 MG: 500 TABLET, FILM COATED ORAL at 01:30

## 2023-03-11 RX ADMIN — Medication 1 CAPSULE: at 08:27

## 2023-03-11 RX ADMIN — LACOSAMIDE 100 MG: 100 TABLET, FILM COATED ORAL at 01:30

## 2023-03-11 RX ADMIN — LACOSAMIDE 100 MG: 100 TABLET, FILM COATED ORAL at 13:39

## 2023-03-11 RX ADMIN — ACETAMINOPHEN 650 MG: 325 TABLET, FILM COATED ORAL at 12:10

## 2023-03-11 RX ADMIN — OLANZAPINE 5 MG: 5 TABLET, ORALLY DISINTEGRATING ORAL at 08:27

## 2023-03-11 RX ADMIN — LACTULOSE 10 G: 10 SOLUTION ORAL at 08:27

## 2023-03-11 RX ADMIN — APIXABAN 5 MG: 5 TABLET, FILM COATED ORAL at 08:27

## 2023-03-11 RX ADMIN — RIFAXIMIN 550 MG: 550 TABLET ORAL at 21:31

## 2023-03-11 RX ADMIN — MIDODRINE HYDROCHLORIDE 10 MG: 5 TABLET ORAL at 08:27

## 2023-03-11 RX ADMIN — GUAIFENESIN 600 MG: 600 TABLET, EXTENDED RELEASE ORAL at 21:31

## 2023-03-11 RX ADMIN — MIDODRINE HYDROCHLORIDE 10 MG: 5 TABLET ORAL at 18:28

## 2023-03-11 RX ADMIN — RAMELTEON 8 MG: 8 TABLET, FILM COATED ORAL at 21:31

## 2023-03-11 RX ADMIN — RIFAXIMIN 550 MG: 550 TABLET ORAL at 08:27

## 2023-03-11 RX ADMIN — TACROLIMUS 1 MG: 1 CAPSULE ORAL at 21:31

## 2023-03-11 RX ADMIN — GUAIFENESIN 600 MG: 600 TABLET, EXTENDED RELEASE ORAL at 08:27

## 2023-03-11 RX ADMIN — OLANZAPINE 5 MG: 5 TABLET, ORALLY DISINTEGRATING ORAL at 21:31

## 2023-03-11 RX ADMIN — FOLIC ACID 1 MG: 1 TABLET ORAL at 08:27

## 2023-03-11 ASSESSMENT — ACTIVITIES OF DAILY LIVING (ADL)
ADLS_ACUITY_SCORE: 54
ADLS_ACUITY_SCORE: 54
ADLS_ACUITY_SCORE: 55
ADLS_ACUITY_SCORE: 54
ADLS_ACUITY_SCORE: 57
ADLS_ACUITY_SCORE: 57
ADLS_ACUITY_SCORE: 54
ADLS_ACUITY_SCORE: 57

## 2023-03-11 NOTE — PROGRESS NOTES
[facility]    Medicine Progress Note - Hospitalist Service    Date of Admission:  1/21/2023    Assessment & Plan   Blanco Osborne is a 57 year-old male admitted on 1/21/2023 as a transfer from [facility] for evaluation of loculated pleural effusion. He has extensive PMH including h/o liver transplant 2016 due to alcohol abuse, pAF on chronic anticoagulation, history of diabetes mellitus type 2, CVA, hypertension, CHRISTIAN on BiPAP.  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 He was recovering in [facility] and was transferred to [facility] for consideration of chest tube placement and possible intrapleural lysis for worsening effusion.     Acute metabolic encephalopathy with delirium, multifactorial  Hepatic encephalopathy  Chronic liver disease, history of liver transplant 2016  End-stage renal disease (ESRD), on hemodialysis (HD)  History of seizure, on Keppra and Vimpat  Waxing and waning mentation, coinciding with lactulose being held at [facility]  Earlier in hospital stay, remained confused and had pulled out tracheostomy tube, PICC line and pulled his dialysis catheter.  Lines replaced.  Palliative care consult 1/26, see note.  * Psychiatry consulted 2/2, see note, follow-up requested.    Continue to reorient as needed; maintain normal day/night, sleep wake cycles; minimize sedating/altering medications as able    Continue Lactulose to 10 mg BID, monitor for symptoms to have 2-3 Bms    Ongoing hemodialysis, as per nephrology, on M-W-F dialysis schedule; ongoing nephrology consult follow-up appreciated    2/20/23 Off restraints.Mentation has improved    2/21/23 -Fall this morningWorsening.Delirium. Ramelteon started.Psyciatry consult appreciated  2/22/23 Improved mentation     2/28:  Appreciate psychiatry evaluation: Increased Zyprexa to 5 mg twice daily and continuing 5 mg twice daily as needed.  Also continue Ramelteon 8 mg nightly     3/6, 3,7: Ct increased confusion . Hypercarbia. (respiratory acidosis)  - ct prn Bipap . Will get CT Chest  - Paracentesis to r/o SBP (negative )  - ct lactulose to ensure 3-4 loose stools/ day    3/8 CT head ordered, vbg and ammonia level  Continue prn bipap  Continue lactulose   CT HEAD no acute intrcranial abnormality    3/9 -VBG every 48 hours  Continue lactulose to have 2-3 bowel movements  Recheck ammonia level tomorrow  Bipap prn      3/10 VBG reviewed Ph wnl   Ammonial level 30    CBC and Bmp reviwed    3/11 Confusion improving  Encourage ambulation  Lactulose to have 2-3 BMS    Hypomagnesemia: On replacement protocol     Bilateral pleural effusions   Acute respiratory failure requiring tracheostomy  - resolved    Pneumonia  - resolved   ARDS  - resolved    Right pneumothorax - resolved   *Initial event was parainfluenza and strep pneumo pneumonia back in November 2022. Treated for ARDS. Had failed extubation x2 and tracheostomy performed on previous hospitalization. *Had additional complications of bilateral pneumothoraces as well as hydropneumothorax- pleural fluid grew candida parapsilosis  *[facility]  *Recently there were concern for worsening effusion while at [facility] and had thoracentesis 01/13 which returned exudative without growth on cultures. CT chest/abdomen/pelvis on 01/18 demonstrated worsening effusions and concerns for infected parapneumonic effusions with possible SBP.  Multiple pulmonologist at [facility] reviewed CT scan recommended transfer to [facility] for consideration of chest tube placement and possible intrapleural lysis  *Thoracic surgery (Dr. Jackson) consulted during admission   *CT chest 1/22, small effusions on imaging and so chest tube was  not inserted.   ID consulted, see note 2/10.  *Patient pulled out his tracheostomy tube on the night 2/1-2/2 and is tolerating well without increased shortness of breath persistent cough or worsening hypoxia  * Chest x-ray 2/3 with cardiomegaly, chronic small bilateral pleural effusions, no pulmonary edema; right internal jugular dialysis catheter in place    Monitor respiratory status, recently stable on room air    Encourage incentive spirometry as able    Wound care previously consulted for tracheostomy site care, monitor for signs and symptoms of infection        Discontinued nystatin 3/01     3/4,3/5:  chest x-ray:Some increase in opacity at right base likely represents a combination of small right pleural effusion and atelectasis/infiltrate.  Normal WBC count of 5.4  -Encourage incentive spirometer and Acapella.  Monitor closely  3/7 CT Chest Slight improvement in right pleural effusion and pleural  thickening since the comparison study.  2.  Similar bilateral infiltrates and minimal left effusion.  3.  New wedge-shaped area of decreased attenuation in the spleen,  question a splenic infarct.    3/10 VBG well compensated, bipap prn    3/11 Consulted Pulmonology for co2 retention and chest infiltrates      # Splenic infract ?  Wedge shaped area on CT scan chest and CT abdomen  Hematology consulted  Discussed plan with Dr Smith on 3/9 from Hematology/oncology consult appreciated recommended supportive managment  TTE no thrombus or vegetations  3/11 No abdominal pain       S/p liver transplant 2016   Chronic immunosuppression, on tacrolimus  Cirrhosis with ascites  Subacute bacterial peritonitis (SBP), resolved  *Main manifestations of this are hepatic encephalopathy and ascites.  Hepatologist at [place] felt that most likely reason for the cirrhosis was metabolic syndrome or alcohol intake.  There was no evidence of rejection on biopsy and there was some evidence of regeneration. Paracentesis done on  "12/22/2022 showed lactobacillus indicating SBP, treated with Unasyn and Augmentin, finished 2-week course 1/12/2023.  Requires large-volume paracentesis periodically for recurring ascites.    *Paracentesis 1/13/2023 yielded about 7500 cc. Cultures NGTD. paracentesis on 1/24 with 4.8 L fluid removal  Paracentesis on 3/6/23 No SBP    Continue intermittent paracentesis as needed if develops symptomatic ascites    Monitor for signs and symptoms of recurrent spontaneous bacterial peritonitis     Further treatment for recurrent ascites with TIPS deferred to his Liver Transplant team and/or Hepatology, he has an appointment on 4/21/2023 with Hepatology, Dr. Simms    Continue Tacrolimus 1 mg BID, rifaximin 550 mg BID    Continue lactulose as ordered, titrate as needed to have 2-3 bms    GI consult as needed in hospital for new acute issues, otherwise as outpatient    Encourgae high protein diet      3/6:   Goals of care   As per prior rounding provider, \"on 1/25 nursing had mentioned that patient had refused to undergo dialysis and want to stop care, palliative care was consulted.  Patient and his spouse denied wanting to stop care and wanted ongoing restorative care including full code\"    Monitor, Full Code status    -Needs placement to TCU     Diarrhea, improved, on lactulose for chronic liver disease  - C difficile negative on 1/31. Secondary to lactulose.  - discontinued rectal tube (2/12) as stools more formed    Continue lactulose with goal of 2 to 3 soft bowel movement in 24 hours     Paroxysmal atrial fibrillation  Chronic anticoagulation, apixaban  Remains in sinus rhythm, on anticoagulation with apixaban indefinitely for stroke prophylaxis.      Monitor rhythm    Continue apixaban anticoagulation    Monitor hemoglobin, see below     Acute anemia  Pt has required intermittent transfusions for on-going anemia.  Anemia most likely secondary to critical illness/chronic disease, chronic renal disease.  Baseline " hemoglobin around 7-8  Likely Epo resistance    Transfuse packed red blood cells to keep hemoglobin > 7    Epoetin lfa-epbx use as per nephrology    Hb 8.5 on 3/9/23    Hb 9.5 today      # Hypercalcemia  Josue hypercalcemia of Immobility  Discussed with nephrology on 3/8/23  Hold VIT D  -Continue to hold Phos lo  Repeat Phosphorus levels in 3-4 days   Encourage ambulation     History PEA arrest   PEA arrest prior to his previous admission and 1 episode of PEA associated with desaturation on 12/20.  No structural cardiac disease.     Stable, continue cardiac Monitoring     Chronic Hypotension  In the past has developed baseline hypotension with cirrhosis and prolonged critical illness.  On hemodialysis.  Receiving midodrine and albumin during dailysis    Continue midodrine, as per nephrology      History of seizure   After hypoxic event, in the context of baseline multifactorial encephalopathy secondary to his ongoing critical illness and prior cardiac arrests and prior strokes and cirrhosis with hepatic encephalopathy. MRI of head of 12/20/22 reveals no PRES or leptomeningeal enhancement but scattered.  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   No recent seizure activity.    Continue levetiracetam, lacosamide     Seizure precautions    Outpatient follow-up with neurology, consult inpatient if needed     # ESRD  # Anemia due to renal disease  # Hypotension during dialysis  -Underwent dialysis  on 2/22/23 and was hypotensive and received midodrine and albumin  Receiving albumin with dialysis        Diabetes mellitus type 2  *Hemoglobin A1c on November 2022 was 4.7  *By report, on Lantus insulin in the past.  Blood sugar checks and sliding scale insulin previously discontinued as has been stable without insulin needs    Monitor with periodic blood sugar checks as needed     Severe malnutrition, protein and calorie type  Moderate dysphagia  G-tube  feedings    Continue with tube feeds via PEG tube    IR replaced the PEG tube on 2/8/2023, monitor    Nutritionist consulted and following for tube feeds    SLP following as well. Oral diet as per speech and language therapy (SLP)     2/20 Nutrition following for tube feeds     Underwent video swallow on 2/23/22     3/1: Change PPI to daily as per family's request     Anxiety  Fluoxetine was discontinued as per psychiatry recommendation on 2/2 (see consult note for details)     Stable, monitor; comfort and reassurance offered during visit    Psychiatry follow-up         Diet: Room Service  Snacks/Supplements Adult: Other; Gelatein+ with brkfst and lunch, and magic cup with dinner (RD); With Meals  Adult Formula Bolus Feeding: Novasource Renal; Route: Gastrostomy; 3 times daily; Volume per Bolus: 240; mL(s); Continue to encourage after meal bolus at 80 mL/hr x 3 hrs, If patient consumes <50% of that meal  Combination Diet Regular Diet; Mildly Thick (level 2) (OK for fresh fruit (e.g., pineapple) per SLP)    DVT Prophylaxis: DOAC  Nixon Catheter: Not present  Lines: PRESENT      CVC Double Lumen Right External jugular Tunneled-Site Assessment: WDL      Cardiac Monitoring: None  Code Status: Full Code      Clinically Significant Risk Factors           # Hypercalcemia: Highest Ca = 10.9 mg/dL in last 2 days, will monitor as appropriate    # Hypoalbuminemia: Lowest albumin = 1.9 g/dL at 1/23/2023  6:38 AM, will monitor as appropriate            # Severe Malnutrition: based on nutrition assessment        Disposition Plan      Expected Discharge Date: 03/13/2023, 12:00 PM  Discharge Delays: Placement - LTC  Destination: inpatient rehabilitation facility  Discharge Comments: Dialysis pt MWF. Will need placement TCU. [facility] willing to accept once no sitter, SW to follow up when sitter free          Ashkan Hernandez MD  Hospitalist Service  [facility]  Securely message with Vocera (more  info)  Text page via [employer] Paging/Directory   ______________________________________________________________________    Interval History   Confusion  Has improved  More engnaged in conversation  Encourage ambulation   Blood pressure stable  Physical Exam   Vital Signs: Temp: 98  F (36.7  C) Temp src: Oral BP: 102/68 Pulse: 89   Resp: 16 SpO2: 96 % O2 Device: None (Room air)    Weight: 175 lbs .72 oz    Physical Exam  Constitutional:       Comments: Chronically sick appearing   Pulmonary:      Effort: Pulmonary effort is normal. No respiratory distress.   Abdominal:      General: There is distension.      Palpations: Abdomen is soft.      Tenderness: There is no abdominal tenderness.   Musculoskeletal:      Right lower leg: No edema.      Left lower leg: No edema.   Neurological:      Comments: Confused  More enganged in conversation       Medical Decision Making     Data     I have personally reviewed the following data over the past 24 hrs:    4.0  \   9.6 (L)   / 138 (L)     136 99 41.0 (H) /  91   4.3 25 3.45 (H) \       ALT: 8 (L) AST: 23 AP: 224 (H) TBILI: 0.4   ALB: 2.9 (L) TOT PROTEIN: 7.6 LIPASE: N/A

## 2023-03-11 NOTE — PLAN OF CARE
Goal Outcome Evaluation:      Plan of Care Reviewed With: patient    SUMMARY: pleural effusion, ESRD, metabolic encephalopathy   DATE & TIME: 3/11/23, 4081-0932  Cognitive Concerns/ Orientation: A&O x1-3 (fluctuates). Restless at times. Soft spoken, slow to respond.   BEHAVIOR & AGGRESSION TOOL COLOR: Green  ABNL VS/O2: VSS on RA. BIPAP encouraged at night, but refuses  ABNL Labs: creatinine= 3.45, alkaline phos=224, ALT=8, hgb=9.6, platelets=138, Magnesium WDL, order placed for am draw  MOBILITY: Strong Ax2 with mark steady; Repositions self frequently in bed. Up to recliner today, PT exercises completed  PAIN MANAGMENT: tylenol given for mild back pain, repositioning and heat packs also offered. Refused lidocaine patch  DIET: Regular diet w/ mildly thick liquids (level 2) with meds & meals, per speech continue plan of care at this time. Ate a good breakfast/lunch. No tube feed needed at this time.   BOWEL/BLADDER: BS active x 4. No urine, on hemodialysis. Continue to monitor Bms, needs x 3 per 24 hours per MD.   DRAIN/DEVICES: R. PIV SL. R chest CVC for hemodialysis. Double lumen PEG tube, clamped.   SKIN: Jaundiced, Scattered scabs and bruising. Blanchable redness to coccyx/intact. Abrasion left elbow and left neck, Mepilex in place.  TEST/PROCEDURES: Dialysis MWF. Dialysis completed yesterday  D/C DATE: Pending, will need TCU placement when ready.   OTHER IMPORTANT INFO: Pulmonology consulted for CO2 retention concerns. Family at bedside and assisting with cares today.

## 2023-03-11 NOTE — PROGRESS NOTES
[facility]    Nephrology Progress Note    Assessment & Plan     <ESRD    ~On maintenance dialysis. Tolerating well.     - Plan on HD MWF        Rainer Fitzpatrick MD  Nephrology  [facility]  Cell:[phone]  On Matthieu   Pager:[phone]            .........    Interval History     Napping when came to see.     Chatted with family. Answered questions.       Temp: 97.7  F (36.5  C) Temp src: Axillary BP: 119/80 Pulse: 94   Resp: 16 SpO2: 93 % O2 Device: None (Room air)      Vitals:    03/06/23 1300 03/07/23 1021 03/10/23 0604   Weight: 85.6 kg (188 lb 11.4 oz) 79 kg (174 lb 2.6 oz) 79.4 kg (175 lb 0.7 oz)       Vital Signs with Ranges    Temp:  [97.5  F (36.4  C)-98  F (36.7  C)] 97.7  F (36.5  C)  Pulse:  [89-94] 94  Resp:  [16-28] 16  BP: (102-119)/(68-80) 119/80  SpO2:  [93 %-96 %] 93 %    I/O last 3 completed shifts:  In: 555 [P.O.:555]  Out: -     Physical Exam    BP Readings from Last 5 Encounters:   03/11/23 119/80   01/21/23 120/71   12/29/22 (!) 139/94   12/16/22 101/63   12/16/22 117/75        Wt Readings from Last 10 Encounters:   03/10/23 79.4 kg (175 lb 0.7 oz)   01/21/23 82.4 kg (181 lb 11.2 oz)   12/28/22 96 kg (211 lb 10.3 oz)   12/16/22 100.2 kg (221 lb)   12/16/22 100.2 kg (221 lb)   12/15/22 87 kg (191 lb 12.8 oz)   10/07/19 137.5 kg (303 lb 3.2 oz)   10/04/19 131.5 kg (290 lb)   02/20/19 140.4 kg (309 lb 9.6 oz)   07/10/18 137.5 kg (303 lb 3.2 oz)     Sleeping calmly.  Breathing unlabored. No snoring/apnea.     Medications       - MEDICATION INSTRUCTIONS -       - MEDICATION INSTRUCTIONS -           - MEDICATION INSTRUCTIONS for Dialysis Patients -   Does not apply See Admin Instructions     sodium chloride 0.9%  250 mL Intravenous Once in dialysis/CRRT     sodium chloride 0.9%  300 mL Hemodialysis Machine Once     apixaban ANTICOAGULANT  5 mg Oral BID     folic acid  1 mg Oral or Feeding Tube Daily     guaiFENesin  600 mg Oral BID     lacosamide  100 mg  Oral Q12H     lactulose  10 g Oral BID     levETIRAcetam  1,000 mg Oral Q24H     lidocaine  1 patch Transdermal Q24H     lidocaine   Transdermal Q8H BIJU     midodrine  10 mg Oral or Feeding Tube TID w/meals     mineral oil-hydrophilic petrolatum   Topical Daily     multivitamin RENAL  1 capsule Oral Daily     - MEDICATION INSTRUCTIONS -   Does not apply Once     [Held by provider] nystatin  500,000 Units Swish & Spit 4x Daily     OLANZapine zydis  5 mg Oral BID     pantoprazole  40 mg Oral QAM AC     ramelteon  8 mg Oral QPM     rifaximin  550 mg Oral or Feeding Tube BID     sodium chloride (PF)  10-30 mL Intracatheter Q8H     sodium chloride (PF)  3 mL Intracatheter Q8H     tacrolimus  1 mg Oral Q12H       Data     UA RESULTS:  Recent Labs   Lab Test 11/25/22  1859 10/31/17  1038   COLOR Yellow [place]   APPEARANCE Slightly Cloudy* Slightly Cloudy   URINEGLC Negative Negative   URINEBILI Negative Negative   URINEKETONE Negative 5*   SG 1.017 1.021   UBLD Moderate* Negative   URINEPH 5.5 5.0   PROTEIN 70* Negative   NITRITE Negative Negative   LEUKEST Small* Negative   RBCU  --  <1   WBCU  --  1      BMP  Recent Labs   Lab 03/11/23  0846 03/10/23  1457 03/09/23  0942 03/08/23  1234    133* 135* 135*   POTASSIUM 4.3 4.1 4.4 4.2   CHLORIDE 99 99 99 100   KELLY 10.9* 9.8 10.7* 9.9   CO2 25 27 26 29   BUN 41.0* 27.4* 51.5* 31.2*   CR 3.45* 2.62* 3.84* 2.72*   GLC 91 117* 100* 85     Phos@[email or web address](phos:4)  CBC)  Recent Labs   Lab 03/11/23  0846 03/10/23  1457 03/09/23  0942 03/08/23  1234   WBC 4.0 3.7* 3.2* 3.0*   HGB 9.6* 9.5* 8.5* 8.3*   HCT 32.3* 32.2* 28.3* 27.6*    99 99 99   * 146* 115* 116*     Lab Results   Component Value Date    ALBUMIN 2.9 03/11/2023    ALBUMIN 3.2 03/10/2023    ALBUMIN 2.5 03/09/2023    ALBUMIN 1.8 12/29/2022    ALBUMIN 1.7 12/28/2022    ALBUMIN 2.1 12/27/2022    ALBUMIN 3.9 04/20/2020    ALBUMIN 3.8 10/07/2019    ALBUMIN 3.8 02/20/2019        Recent Labs   Lab  03/11/23  0846   HGB 9.6*   HCT 32.3*          Recent Labs   Lab 03/11/23  0846 03/10/23  1457   AST 23 23   ALT 8* 9*   ALKPHOS 224* 231*   BILITOTAL 0.4 0.4   CHANDLER  --  30     No lab results found in last 7 days.  25 OH Vit D2   Date Value Ref Range Status   09/24/2016 <5 ug/L Final     25 OH Vitamin D2   Date Value Ref Range Status   02/16/2023 <5 ug/L Final     25 OH Vit D3   Date Value Ref Range Status   09/24/2016 8 ug/L Final     25 OH Vitamin D3   Date Value Ref Range Status   02/16/2023 37 ug/L Final     25 OH Vit D total   Date Value Ref Range Status   09/24/2016 (L) 20 - 75 ug/L Final    <13  Season, race, dietary intake, and treatment affect the concentration of   25-hydroxy-Vitamin D. Values may decrease during winter months and increase   during summer months. Values 20-29 ug/L may indicate Vitamin D insufficiency   and values <20 ug/L may indicate Vitamin D deficiency.   This test was developed and its performance characteristics determined by the   [facility],  Special [facility] Laboratory. It has   not been cleared or approved by the FDA. The laboratory is regulated under CLIA   as qualified to perform high-complexity testing. This test is used for clinical   purposes. It should not be regarded as investigational or for research.       25 OH Vit D Total   Date Value Ref Range Status   02/16/2023 <42 20 - 75 ug/L Final     Comment:     Season, race, dietary intake, and treatment affect the concentration of 25-hydroxy-Vitamin D. Values may decrease during winter months and increase during summer months. Values 20-29 ug/L may indicate Vitamin D insufficiency and values <20 ug/L may indicate Vitamin D deficiency.     Recent Labs   Lab 03/07/23  1004   PTHI 7*       Attestation:   I have reviewed today's relevant vital signs, notes, medications, labs and imaging.

## 2023-03-11 NOTE — PROGRESS NOTES
Care Management Follow Up    Length of Stay (days): 49    Expected Discharge Date: 03/13/2023     Concerns to be Addressed: adjustment to diagnosis/illness, care coordination/care conferences, discharge planning     Patient plan of care discussed at interdisciplinary rounds: Yes    Anticipated Discharge Disposition: LTC/TCU     Anticipated Discharge Services:    Anticipated Discharge DME:      Patient/family educated on Medicare website which has current facility and service quality ratings:    Education Provided on the Discharge Plan:    Patient/Family in Agreement with the Plan: yes    Referrals Placed by CM/SW:    Private pay costs discussed: Not applicable    Additional Information:  SW called spouse and left vm to inquire about the possibility of needing LTC and requesting a call back to discuss costs/MA. SW will continue to follow.    Addendum: SW poke with wife and she is wanting to still pursue tcu at this time but would like  to touch base regarding MA.  SW discussed how [facility] is considering patient and wife prefers a facility in the [facility] and requested SW continue to look until patient is medically ready. PAULIE sent email to  requesting they touch base with spouse.      ADILENE Crowley    [facility]

## 2023-03-11 NOTE — PLAN OF CARE
Goal Outcome Evaluation:      Plan of Care Reviewed With: patient    Overall Patient Progress: no changeOverall Patient Progress: no change         SUMMARY: pleural effusion, ESRD, metabolic encephalopathy   DATE & TIME: 3/10/23, pm shift.  Cognitive Concerns/ Orientation: A&O x1- self. Restless at times. Soft spoken, slow to respond.   BEHAVIOR & AGGRESSION TOOL COLOR: Green  ABNL VS/O2: VSS on RA. BIPAP at night  ABNL Labs: Na 133, Cr 2.62, Albumin 3.2, WBC 3.7, Hgb 9.5, ALT 9, Alkaline phosphatase 231  MOBILITY: Strong Ax2 with mark steady; Repositions self frequently in bed. Up to recliner x 1  PAIN MANAGMENT: Denied pain.  DIET: Regular diet w/ mildly thick liquids (level 2) with meds & meals. Ate 100% for dinner. No tube feed needed at this time.   BOWEL/BLADDER: Had 1 large soft BM today. No urine, on hemodialysis.  DRAIN/DEVICES: Removed his PIV. New R PIV SL. R chest CVC for hemodialysis. Double lumen PEG tube, clamped.   SKIN: Jaundiced, Scattered scabs and bruising. Blanchable redness to coccyx. Abrasion left elbow and left neck, Mepilex in place.  TEST/PROCEDURES: Dialysis MWF. Dialysis completed today.  D/C DATE: Pending, will need TCU placement when ready.   OTHER IMPORTANT INFO: Sitter at bedside.

## 2023-03-12 LAB
ALBUMIN SERPL BCG-MCNC: 2.5 G/DL (ref 3.5–5.2)
ALP SERPL-CCNC: 200 U/L (ref 40–129)
ALT SERPL W P-5'-P-CCNC: 8 U/L (ref 10–50)
AMMONIA PLAS-SCNC: 64 UMOL/L (ref 16–60)
ANION GAP SERPL CALCULATED.3IONS-SCNC: 11 MMOL/L (ref 7–15)
AST SERPL W P-5'-P-CCNC: 18 U/L (ref 10–50)
BASE EXCESS BLDV CALC-SCNC: 0.5 MMOL/L (ref -7.7–1.9)
BILIRUB SERPL-MCNC: 0.4 MG/DL
BUN SERPL-MCNC: 61.5 MG/DL (ref 6–20)
CALCIUM SERPL-MCNC: 11 MG/DL (ref 8.6–10)
CHLORIDE SERPL-SCNC: 101 MMOL/L (ref 98–107)
CREAT SERPL-MCNC: 4.44 MG/DL (ref 0.67–1.17)
DEPRECATED HCO3 PLAS-SCNC: 25 MMOL/L (ref 22–29)
ERYTHROCYTE [DISTWIDTH] IN BLOOD BY AUTOMATED COUNT: 15.8 % (ref 10–15)
GFR SERPL CREATININE-BSD FRML MDRD: 15 ML/MIN/1.73M2
GLUCOSE SERPL-MCNC: 87 MG/DL (ref 70–99)
HCO3 BLDV-SCNC: 26 MMOL/L (ref 21–28)
HCT VFR BLD AUTO: 26.2 % (ref 40–53)
HGB BLD-MCNC: 7.9 G/DL (ref 13.3–17.7)
MAGNESIUM SERPL-MCNC: 1.8 MG/DL (ref 1.7–2.3)
MCH RBC QN AUTO: 30.2 PG (ref 26.5–33)
MCHC RBC AUTO-ENTMCNC: 30.2 G/DL (ref 31.5–36.5)
MCV RBC AUTO: 100 FL (ref 78–100)
O2/TOTAL GAS SETTING VFR VENT: 0 %
PCO2 BLDV: 49 MM HG (ref 40–50)
PH BLDV: 7.34 [PH] (ref 7.32–7.43)
PLATELET # BLD AUTO: 116 10E3/UL (ref 150–450)
PO2 BLDV: 68 MM HG (ref 25–47)
POTASSIUM SERPL-SCNC: 4.8 MMOL/L (ref 3.4–5.3)
PROT SERPL-MCNC: 6.9 G/DL (ref 6.4–8.3)
RBC # BLD AUTO: 2.62 10E6/UL (ref 4.4–5.9)
SODIUM SERPL-SCNC: 137 MMOL/L (ref 136–145)
WBC # BLD AUTO: 3.5 10E3/UL (ref 4–11)

## 2023-03-12 PROCEDURE — 99233 SBSQ HOSP IP/OBS HIGH 50: CPT | Performed by: STUDENT IN AN ORGANIZED HEALTH CARE EDUCATION/TRAINING PROGRAM

## 2023-03-12 PROCEDURE — 82140 ASSAY OF AMMONIA: CPT | Performed by: STUDENT IN AN ORGANIZED HEALTH CARE EDUCATION/TRAINING PROGRAM

## 2023-03-12 PROCEDURE — 82803 BLOOD GASES ANY COMBINATION: CPT | Performed by: STUDENT IN AN ORGANIZED HEALTH CARE EDUCATION/TRAINING PROGRAM

## 2023-03-12 PROCEDURE — 83550 IRON BINDING TEST: CPT | Performed by: INTERNAL MEDICINE

## 2023-03-12 PROCEDURE — 250N000013 HC RX MED GY IP 250 OP 250 PS 637: Performed by: INTERNAL MEDICINE

## 2023-03-12 PROCEDURE — 83970 ASSAY OF PARATHORMONE: CPT | Performed by: INTERNAL MEDICINE

## 2023-03-12 PROCEDURE — 99207 PR NO BILLABLE SERVICE THIS VISIT: CPT | Performed by: INTERNAL MEDICINE

## 2023-03-12 PROCEDURE — 83735 ASSAY OF MAGNESIUM: CPT | Performed by: STUDENT IN AN ORGANIZED HEALTH CARE EDUCATION/TRAINING PROGRAM

## 2023-03-12 PROCEDURE — 82728 ASSAY OF FERRITIN: CPT | Performed by: INTERNAL MEDICINE

## 2023-03-12 PROCEDURE — 250N000013 HC RX MED GY IP 250 OP 250 PS 637: Performed by: STUDENT IN AN ORGANIZED HEALTH CARE EDUCATION/TRAINING PROGRAM

## 2023-03-12 PROCEDURE — 250N000013 HC RX MED GY IP 250 OP 250 PS 637: Performed by: HOSPITALIST

## 2023-03-12 PROCEDURE — 80053 COMPREHEN METABOLIC PANEL: CPT | Performed by: STUDENT IN AN ORGANIZED HEALTH CARE EDUCATION/TRAINING PROGRAM

## 2023-03-12 PROCEDURE — 250N000013 HC RX MED GY IP 250 OP 250 PS 637

## 2023-03-12 PROCEDURE — 120N000001 HC R&B MED SURG/OB

## 2023-03-12 PROCEDURE — 250N000013 HC RX MED GY IP 250 OP 250 PS 637: Performed by: FAMILY MEDICINE

## 2023-03-12 PROCEDURE — 85027 COMPLETE CBC AUTOMATED: CPT | Performed by: STUDENT IN AN ORGANIZED HEALTH CARE EDUCATION/TRAINING PROGRAM

## 2023-03-12 PROCEDURE — 250N000012 HC RX MED GY IP 250 OP 636 PS 637: Performed by: STUDENT IN AN ORGANIZED HEALTH CARE EDUCATION/TRAINING PROGRAM

## 2023-03-12 PROCEDURE — 36415 COLL VENOUS BLD VENIPUNCTURE: CPT | Performed by: STUDENT IN AN ORGANIZED HEALTH CARE EDUCATION/TRAINING PROGRAM

## 2023-03-12 RX ADMIN — ACETAMINOPHEN 650 MG: 325 TABLET, FILM COATED ORAL at 12:22

## 2023-03-12 RX ADMIN — FOLIC ACID 1 MG: 1 TABLET ORAL at 09:00

## 2023-03-12 RX ADMIN — RAMELTEON 8 MG: 8 TABLET, FILM COATED ORAL at 20:27

## 2023-03-12 RX ADMIN — LACTULOSE 10 G: 10 SOLUTION ORAL at 09:00

## 2023-03-12 RX ADMIN — OLANZAPINE 5 MG: 5 TABLET, ORALLY DISINTEGRATING ORAL at 20:27

## 2023-03-12 RX ADMIN — WHITE PETROLATUM: 1.75 OINTMENT TOPICAL at 20:42

## 2023-03-12 RX ADMIN — APIXABAN 5 MG: 5 TABLET, FILM COATED ORAL at 09:00

## 2023-03-12 RX ADMIN — LEVETIRACETAM 1000 MG: 500 TABLET, FILM COATED ORAL at 00:01

## 2023-03-12 RX ADMIN — PANTOPRAZOLE SODIUM 40 MG: 40 TABLET, DELAYED RELEASE ORAL at 09:00

## 2023-03-12 RX ADMIN — MIDODRINE HYDROCHLORIDE 10 MG: 5 TABLET ORAL at 17:41

## 2023-03-12 RX ADMIN — RIFAXIMIN 550 MG: 550 TABLET ORAL at 09:00

## 2023-03-12 RX ADMIN — MIDODRINE HYDROCHLORIDE 10 MG: 5 TABLET ORAL at 12:22

## 2023-03-12 RX ADMIN — LACOSAMIDE 100 MG: 100 TABLET, FILM COATED ORAL at 00:01

## 2023-03-12 RX ADMIN — ACETAMINOPHEN 650 MG: 325 TABLET, FILM COATED ORAL at 00:02

## 2023-03-12 RX ADMIN — MIDODRINE HYDROCHLORIDE 10 MG: 5 TABLET ORAL at 09:00

## 2023-03-12 RX ADMIN — TACROLIMUS 1 MG: 1 CAPSULE ORAL at 20:27

## 2023-03-12 RX ADMIN — Medication 1 CAPSULE: at 09:00

## 2023-03-12 RX ADMIN — GUAIFENESIN 600 MG: 600 TABLET, EXTENDED RELEASE ORAL at 09:00

## 2023-03-12 RX ADMIN — GUAIFENESIN 600 MG: 600 TABLET, EXTENDED RELEASE ORAL at 20:27

## 2023-03-12 RX ADMIN — LACOSAMIDE 100 MG: 100 TABLET, FILM COATED ORAL at 12:22

## 2023-03-12 RX ADMIN — LACTULOSE 10 G: 10 SOLUTION ORAL at 20:26

## 2023-03-12 RX ADMIN — ACETAMINOPHEN 650 MG: 325 TABLET, FILM COATED ORAL at 17:44

## 2023-03-12 RX ADMIN — OLANZAPINE 5 MG: 5 TABLET, ORALLY DISINTEGRATING ORAL at 09:00

## 2023-03-12 RX ADMIN — TACROLIMUS 1 MG: 1 CAPSULE ORAL at 09:00

## 2023-03-12 RX ADMIN — RIFAXIMIN 550 MG: 550 TABLET ORAL at 20:27

## 2023-03-12 RX ADMIN — APIXABAN 5 MG: 5 TABLET, FILM COATED ORAL at 20:27

## 2023-03-12 ASSESSMENT — ACTIVITIES OF DAILY LIVING (ADL)
ADLS_ACUITY_SCORE: 59
ADLS_ACUITY_SCORE: 57
ADLS_ACUITY_SCORE: 59
ADLS_ACUITY_SCORE: 63
ADLS_ACUITY_SCORE: 59

## 2023-03-12 NOTE — PLAN OF CARE
Goal Outcome Evaluation:    Plan of Care Reviewed With: patient     SUMMARY: pleural effusion, ESRD, metabolic encephalopathy     DATE & TIME: 3/11/23 Night  Cognitive Concerns/ Orientation: Alert to self only; speaks softly, slow to respond; Restless most of night-picking/pulling at PIV/CVC/PEG/Neck wound-concern for access loss of drains/devices  BEHAVIOR & AGGRESSION TOOL COLOR: Green  ABNL VS/O2: VSS, room air   ABNL Labs: BUN 41.0; Creat 3.45; Alb 2.9; Alk phos 224; ALT 8  MOBILITY: Strong Assist-2 with Justyna Oro; Repositions self frequently in bed   PAIN MANAGMENT: PRN Tylenol given x 1 for low back pain  DIET: Regular diet w/ mildly thick liquids (level 2) with meds & meals. Bolus TF when not eating sufficiently  BOWEL/BLADDER: Incontinent of bowel at times; 1 smear overnight  DRAIN/DEVICES: R PIV saline locked; R chest CVC for hemodialysis; PEG tube, clamped.   SKIN: Jaundiced, Scattered scabs and bruising. Blanchable redness to coccyx. Abrasion left elbow and left neck, Mepilex in place.  TEST/PROCEDURES: Dialysis MWF   D/C DATE: Pending, will need TCU placement when ready.   OTHER IMPORTANT INFO: Sitter at bedside. Pt restless through the night-removed neck dressing and was picking at skin, redressed with foam; Pt to have CHG bath daily

## 2023-03-12 NOTE — PROVIDER NOTIFICATION
MD Notification    Notified Person: MD    Notified Person Name: Mary     Notification Date/Time: 3/12/2023, [id number]    Notification Interaction: [employer] messaging     Purpose of Notification: Hi, I saw ammonia level was 64 today. Just wanted to make sure you saw. Patient has had 3 BMs on my shift. Orientation appears to be at baseline (A x Ox2-3).    Orders Received:  -no changes to current plan   -continue to monitor clinically     Comments:

## 2023-03-12 NOTE — CONSULTS
Pulmonary Medicine Consultation        Date of Admission: 1/21/2023  Primary Attending:  No att. providers found  Consulting Physician: Blanco Taylor MD      History:    Blanco Osborne is a 57 year-old male patient with multiple medical problems and long hospitalization admitted January 23, 2023 with past medical history of liver transplant(2016), recent prolonged hospitalization 11/12-12/15 for PEA cardiac arrest, Strep/Parainfluenza pneumonia with ARDS, MODS, ?aspergillus pna s/p 1 week of voriconazole, right PTX requiring chest tube since removed.  Now HD dependent, s/p trach/PEG.  Course further c/b new left PTX requiring chest tube(since removed), b/l pleural effusions, ascites, mucus plugging, PEA arrest and seizure on 12/20 now on Vimpat and Keppra indefinitely, multifocal strokes seen on MRI 12/20, pleural fluid culture growing candida parapsilosis(fluconazole) & ascites cx growing lactobacillus(s/p augmentin), and CSF panel positive for HHV6 ultimately decided to not treat.   Self decanulated early Feb 2023.his main complaint is back pain and denies any significant respiratory complaints although was found to have hypercapnia which likely multifactorial due to shallow respirations and decreased mentation also patient was receiving opiates . But has an over several days.  Review of imaging reveals predominantly bilateral basilar atelectasis,but nothing to suggest new pneumonia or pulmonary infection.  He states has never had a sleep study.  And even though there is a BiPAP device in his room has not been using it. Remains afebrile without elevated white count.  Per patient he has not been using his incentive spirometer at all and because of his back pain has not been able to work as well with PT.  He denies nausea, vomiting, no palpitations, no near-syncopal events.  And we are consulted for recommendations in the setting of mild hypercapnia.    Review of system:   ROS is negative except for  items mentioned above and in HPI.           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     NAFLD (nonalcoholic fatty liver disease) 2016     CHRISTIAN on CPAP      Parainfluenza type 1 infection 2022     SBP (spontaneous bacterial peritonitis) (H)     [facility]     Sepsis due to Streptococcus pneumoniae with acute hypoxic respiratory failure (H) 2022     Tubular adenoma 10/2019    Large cecal adenoma- due for surveillance colonoscopy in 3 years (10/2022)       Past Surgical History:   Procedure Laterality Date     APPENDECTOMY       BENCH LIVER N/A 2016    Procedure: BENCH LIVER;  Surgeon: Enoc Crews MD;  Location: [facility] OR     BRONCHOSCOPY FLEXIBLE AND RIGID N/A 2022    Procedure: BRONCHOSCOPY;  Surgeon: Alena Valenzuela MD;  Location:  GI     COLONOSCOPY  13    repeat in 2018     COLONOSCOPY N/A 10/4/2019    Procedure: COLONOSCOPY, WITH POLYPECTOMY AND BIOPSY;  Surgeon: Go Chong MD;  Location: [facility] OR     HERNIA REPAIR       IR CHEST TUBE PLACEMENT NON-TUNNELED RIGHT  2022     IR CVC TUNNEL PLACEMENT > 5 YRS OF AGE  2022     IR GASTROSTOMY TUBE CHANGE  2023     IR GASTROSTOMY TUBE PERCUTANEOUS PLCMNT  2022     IR PARACENTESIS  2022     IR PARACENTESIS  2022     IR THORACENTESIS  2022     IR TRANSCATHETER BIOPSY  2022     TRACHEOSTOMY N/A 2022    Procedure: TRACHEOSTOMY;  Surgeon: Nilesh Jackson MD;  Location:  OR     TRANSPLANT LIVER RECIPIENT  DONOR N/A 2016    Procedure: TRANSPLANT LIVER RECIPIENT  DONOR;  Surgeon: Enoc Crews MD;  Location: [facility] OR       Patient Active Problem List   Diagnosis      Cirrhosis of liver (H)     NAFLD (nonalcoholic fatty liver disease)     Liver transplanted (H)     Immunosuppression (H)     Other ascites     Coagulopathy (H)     Hyperlipidemia     Hypertension     Leukocytosis     SBP (spontaneous bacterial peritonitis) (H)     Respiratory arrest (H)     Lactic acidosis     Respiratory acidosis     Acute renal failure, unspecified acute renal failure type (H)     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)     Acquired immunocompromised state (H)     Sepsis due to Streptococcus pneumoniae with acute hypoxic respiratory failure (H)     Encounter for therapeutic drug monitoring     Aspergillus pneumonia (H)     Embolic stroke (H)     Hyperammonemia (H)     Pneumothorax     Critical illness myopathy     Pneumothorax on left     Encephalopathy     PEA (Pulseless electrical activity) (H)     Seizures (H)     Hypotension, unspecified hypotension type     Acute on chronic respiratory failure with hypoxia (H)     History of sudden cardiac arrest, PEA     ESRD (end stage renal disease) on dialysis (H)     Anemia of chronic disease due to ESRD, cirrhosis and hypersplenism     Parapneumonic effusion     Pleural effusion       Social History     Social History     Socioeconomic History     Marital status:      Spouse name: Not on file     Number of children: Not on file     Years of education: Not on file     Highest education level: Not on file   Occupational History     Not on file   Tobacco Use     Smoking status: Never     Smokeless tobacco: Never   Substance and Sexual Activity     Alcohol use: Not Currently     Alcohol/week: 0.0 standard drinks     Drug use: No     Sexual activity: Not on file   Other Topics Concern     Parent/sibling w/ CABG, MI or angioplasty before 65F 55M? Not Asked   Social History Narrative     Not on file     Social Determinants of Health     Financial Resource Strain: Not on file   Food Insecurity: Not on file   Transportation Needs: Not on file   Physical  Activity: Not on file   Stress: Not on file   Social Connections: Not on file   Intimate Partner Violence: Not on file   Housing Stability: Not on file         Family History  No family history on file.        Medications  No current outpatient medications on file.     Current Facility-Administered Medications Ordered in Epic   Medication Dose Route Frequency Last Rate Last Admin     - MEDICATION INSTRUCTIONS for Dialysis Patients -   Does not apply See Admin Instructions         0.9% sodium chloride BOLUS  250 mL Intravenous Once in dialysis/CRRT         0.9% sodium chloride BOLUS  300 mL Hemodialysis Machine Once         0.9% sodium chloride BOLUS  100-150 mL Intravenous Q15 Min PRN         acetaminophen (TYLENOL) tablet 325-650 mg  325-650 mg Oral Q4H PRN   650 mg at 03/12/23 0002    Or     acetaminophen (TYLENOL) Suppository 650 mg  650 mg Rectal Q4H PRN         acetylcysteine (MUCOMYST) 20 % nebulizer solution 2 mL  2 mL Nebulization BID PRN         albuterol (PROVENTIL) neb solution 2.5 mg  2.5 mg Nebulization Q2H PRN         apixaban ANTICOAGULANT (ELIQUIS) tablet 5 mg  5 mg Oral BID   5 mg at 03/11/23 2131     benzonatate (TESSALON) capsule 100 mg  100 mg Oral TID PRN   100 mg at 03/05/23 1350     calcium carbonate (TUMS) chewable tablet 500 mg  500 mg Oral 4x Daily PRN         carboxymethylcellulose PF (REFRESH PLUS) 0.5 % ophthalmic solution 1 drop  1 drop Both Eyes Q1H PRN         glucose gel 15-30 g  15-30 g Oral Q15 Min PRN        Or     dextrose 50 % injection 25-50 mL  25-50 mL Intravenous Q15 Min PRN        Or     glucagon injection 1 mg  1 mg Subcutaneous Q15 Min PRN         folic acid (FOLVITE) tablet 1 mg  1 mg Oral or Feeding Tube Daily   1 mg at 03/11/23 0827     guaiFENesin (MUCINEX) 12 hr tablet 600 mg  600 mg Oral BID   600 mg at 03/11/23 2131     ipratropium - albuterol 0.5 mg/2.5 mg/3 mL (DUONEB) neb solution 3 mL  3 mL Nebulization Q4H PRN         lacosamide (VIMPAT) tablet 100 mg  100 mg  Oral Q12H   100 mg at 03/12/23 0001     lactulose (CHRONULAC) solution 10 g  10 g Oral BID   10 g at 03/11/23 2132     levETIRAcetam (KEPPRA) tablet 1,000 mg  1,000 mg Oral Q24H   1,000 mg at 03/12/23 0001     Lidocaine (LIDOCARE) 4 % Patch 1 patch  1 patch Transdermal Q24H   1 patch at 03/09/23 0946     lidocaine (LMX4) cream   Topical Q1H PRN         lidocaine 1 % 0.1-1 mL  0.1-1 mL Other Q1H PRN         lidocaine patch in PLACE   Transdermal Q8H [facility]         melatonin liquid 6 mg  6 mg Oral or Feeding Tube At Bedtime PRN   6 mg at 03/07/23 0148     midodrine (PROAMATINE) tablet 10 mg  10 mg Oral or Feeding Tube TID w/meals   10 mg at 03/11/23 1828     mineral oil-hydrophilic petrolatum (AQUAPHOR)   Topical BID PRN         mineral oil-hydrophilic petrolatum (AQUAPHOR)   Topical Daily   Given at 03/11/23 0835     multivitamin RENAL (NEPHROCAPS/TRIPHROCAPS) capsule 1 capsule  1 capsule Oral Daily   1 capsule at 03/11/23 0827     naloxone (NARCAN) injection 0.2 mg  0.2 mg Intravenous Q2 Min PRN        Or     naloxone (NARCAN) injection 0.4 mg  0.4 mg Intravenous Q2 Min PRN        Or     naloxone (NARCAN) injection 0.2 mg  0.2 mg Intramuscular Q2 Min PRN        Or     naloxone (NARCAN) injection 0.4 mg  0.4 mg Intramuscular Q2 Min PRN         nitroGLYcerin (NITROSTAT) sublingual tablet 0.4 mg  0.4 mg Sublingual Q5 Min PRN         No heparin via hemodialysis machine   Does not apply Once         No lozenges or gum should be given while patient on BIPAP/AVAPS/AVAPS AE   Does not apply Continuous PRN         [Held by provider] nystatin (MYCOSTATIN) suspension 500,000 Units  500,000 Units Swish & Spit 4x Daily   500,000 Units at 03/01/23 1219     OLANZapine zydis (zyPREXA) ODT tab 5 mg  5 mg Oral BID   5 mg at 03/11/23 2131     OLANZapine zydis (zyPREXA) ODT tab 5 mg  5 mg Oral BID PRN   5 mg at 03/08/23 9942     ondansetron (ZOFRAN) injection 4 mg  4 mg Intravenous Q6H PRN   4 mg at 01/30/23 1349    Or     ondansetron  (ZOFRAN ODT) ODT tab 4 mg  4 mg Oral Q6H PRN   4 mg at 23 1251     pantoprazole (PROTONIX) EC tablet 40 mg  40 mg Oral QAM AC   40 mg at 23 0827     Patient may continue current oral medications   Does not apply Continuous PRN         ramelteon (ROZEREM) tablet 8 mg  8 mg Oral QPM   8 mg at 23 213     rifaximin (XIFAXAN) tablet 550 mg  550 mg Oral or Feeding Tube BID   550 mg at 23 213     simethicone (MYLICON) chewable tablet 80 mg  80 mg Oral Q6H PRN   80 mg at 23 1553     sodium chloride (PF) 0.9% PF flush 10-30 mL  10-30 mL Intracatheter Q8H   10 mL at 23 1518     sodium chloride (PF) 0.9% PF flush 3 mL  3 mL Intracatheter Q8H   3 mL at 23 2142     sodium chloride (PF) 0.9% PF flush 3 mL  3 mL Intracatheter q1 min prn   3 mL at 23 1045     tacrolimus (GENERIC EQUIVALENT) capsule 1 mg  1 mg Oral Q12H   1 mg at 23     urea (CARMOL) 10 % cream   Topical BID PRN   Given at 23 1319     No current [facility]-ordered outpatient medications on file.       No Known Allergies            Physical Examination:   Vitals:    23 1210 23 1407 23 1636 23 0002   BP:   119/80 109/67   BP Location:   Left arm Left arm   Patient Position:    Semi-Salgado's   Cuff Size:    Adult Regular   Pulse:   94 96   Resp: 16 16 16 18   Temp:   97.7  F (36.5  C) 98.2  F (36.8  C)   TempSrc:   Axillary Oral   SpO2:   93% 94%   Weight:         Body mass index is 23.74 kg/m .  Temp (24hrs), Av  F (36.7  C), Min:97.7  F (36.5  C), Max:98.2  F (36.8  C)        Constitutional:  Mild discomfort due to back pain  HENT:  mucous membranes moist.  Eyes: PERRLA, no icterus, no pallor.   Neck: No lymphadenopathy or thyromegaly, trachea midline, no carotid bruits.  Cardiovascular: Regular rate and rythym, no murmurs, rubs or gallops, no peripheral edema.  Respiratory/Chest: with posterior auscultation vesicular breath sounds bilaterally, no bronchial breath sounds, no  wheezing.  Resonant to percussion with mild dullness at the bases  Gastrointestinal: Abdomen was soft, non-tender, non-distended, no masses felt, no hepatosplenomegaly.  Musculoskeletal: No clubbing or cyanosis, full range of motion in all extremities.  Neurological: No focal motor or sensory deficits.   Skin: No skin rash, hives, petechiae, or breakdown.        CMP  Recent Labs   Lab 03/11/23  0846 03/10/23  1457 03/09/23  0942 03/08/23  1234 03/07/23  1004 03/06/23  1915 03/06/23  1253    133* 135* 135* 137  --   --    POTASSIUM 4.3 4.1 4.4 4.2 4.6  --   --    CHLORIDE 99 99 99 100 98  --   --    CO2 25 27 26 29 30*  --   --    ANIONGAP 12 7 10 6* 9  --   --    GLC 91 117* 100* 85 114*  --   --    BUN 41.0* 27.4* 51.5* 31.2* 46.0*  --   --    CR 3.45* 2.62* 3.84* 2.72* 3.91*  --   --    GFRESTIMATED 20* 27* 17* 26* 17*  --   --    KELLY 10.9* 9.8 10.7* 9.9 10.7*  --   --    MAG 1.7  --   --  1.5* 1.9 1.6* 1.5*   PHOS  --   --   --   --   --   --  3.5   PROTTOTAL 7.6 7.6 7.0 6.9  --   --   --    ALBUMIN 2.9* 3.2* 2.5* 2.6*  --   --  2.6*   BILITOTAL 0.4 0.4 0.4 0.4  --   --   --    ALKPHOS 224* 231* 195* 200*  --   --   --    AST 23 23 17 18  --   --   --    ALT 8* 9* 6* 6*  --   --   --      CBC  Recent Labs   Lab 03/11/23  0846 03/10/23  1457 03/09/23  0942 03/08/23  1234   WBC 4.0 3.7* 3.2* 3.0*   RBC 3.23* 3.24* 2.85* 2.79*   HGB 9.6* 9.5* 8.5* 8.3*   HCT 32.3* 32.2* 28.3* 27.6*    99 99 99   MCH 29.7 29.3 29.8 29.7   MCHC 29.7* 29.5* 30.0* 30.1*   RDW 15.7* 15.6* 15.7* 15.6*   * 146* 115* 116*     INRNo lab results found in last 7 days.  Arterial Blood Gas  Recent Labs   Lab 03/10/23  1457 03/08/23  1640 03/07/23  1657 03/07/23  1004 03/06/23  1253 03/05/23 2059   PH  --   --   --   --   --  7.30*   PCO2  --   --   --   --   --  57*   PO2  --   --   --   --   --  94   HCO3  --   --   --   --   --  28   O2PER 21 21 25 25   < > 30    < > = values in this interval not displayed.     No results  for input(s): CULT in the last 168 hours.    Diagnostic Studies:  Chest Radiology:     CT CHEST WITHOUT CONTRAST 3/7/2023 2:41 PM      HISTORY: RLL lesion.     COMPARISON: January 22, 2023     TECHNIQUE: Volumetric helical acquisition of CT images of the chest  from the clavicles to the kidneys were acquired without IV contrast.  Radiation dose for this scan was reduced using automated exposure  control, adjustment of the mA and/or kV according to patient size, or  iterative reconstruction technique.     FINDINGS:      LUNGS AND PLEURA: Minimal improvement in right pleural effusion,  pleural thickening since comparison. Similar associated atelectasis  vs. less likely infiltrate. Similar minimal left pleural effusion with  associated compressive atelectasis and/or infiltrate. Stable  granulomatous change.     MEDIASTINUM/AXILLAE: No gross adenopathy in the absence of contrast.  Mildly enlarged pulmonary artery similar to previous, question  pulmonary hypertension. No aortic aneurysm.     CORONARY ARTERY CALCIFICATIONS: Moderate.     UPPER ABDOMEN: Splenomegaly is again evident measuring 20 cm. There is  a new wedge-shaped area of decreased attenuation, this could represent  a splenic infarct.     MUSCULOSKELETAL: No frankly destructive bony lesions.                                                                      IMPRESSION:  1.  Slight improvement in right pleural effusion and pleural  thickening since the comparison study.  2.  Similar bilateral infiltrates and minimal left effusion.  3.  New wedge-shaped area of decreased attenuation in the spleen,  question a splenic infarct.         CT ABDOMEN WITHOUT CONTRAST  3/9/2023 8:15 AM      HISTORY: Splenomegaly. Question splenic infarct.     COMPARISON: Chest CT from March 7, 2023, chest, abdomen and pelvis CT  from January 6, 2023 images are not available for comparison. Abdomen  and pelvis CT from December 19, 2022.     TECHNIQUE: Volumetric helical acquisition  of CT images of the abdomen  without contrast. Radiation dose for this scan was reduced using  automated exposure control, adjustment of the mA and/or kV according  to patient size, or iterative reconstruction technique.     FINDINGS: Wedge-shaped area in the spleen again noted suspicious for a  small splenic infarct. Spleen measures 18.3 cm in craniocaudal  dimension, 19.3 cm in AP dimension. Small to moderate amount of  ascites. Nonobstructing renal stone on the right measuring 5 mm in the  upper pole. No other urolithiasis. No hydronephrosis. No dilated  bowel. No focal liver lesions. Pancreas and adrenal glands grossly  unremarkable. No frankly destructive bony lesions.                                                                      IMPRESSION:  1. Splenomegaly with a small probable infarct superiorly, infarct is  new since the most recent available abdomen comparison.   2. Small to moderate ascites.    Assessment:     57 year-old male patient with multiple medical problems and long hospitalization admitted January 23, 2023 with past medical history of liver transplant(2016), recent prolonged hospitalization 11/12-12/15 for PEA cardiac arrest, Strep/Parainfluenza pneumonia with ARDS, MODS, ?aspergillus pna s/p 1 week of voriconazole, right PTX requiring chest tube since removed.  Now HD dependent, s/p trach/PEG.  Course further c/b new left PTX requiring chest tube(since removed), b/l pleural effusions, ascites, mucus plugging, PEA arrest and seizure on 12/20 now on Vimpat and Keppra indefinitely, multifocal strokes seen on MRI 12/20, pleural fluid culture growing candida parapsilosis(fluconazole) & ascites cx growing lactobacillus(s/p augmentin), and CSF panel positive for HHV6 ultimately decided to not treat.   Evaluation of CT scan shows mild bibasilar atelectasis which could in part explain his mild hypercapnia.      Pulmonary Diagnoses: Abnl CT/CXR R91.8, Atelectasis J98.11, MALDONADO R06.09 and Hypercapnia  "R06.89      Recommendations          Hypercapnia resolving and significantly improve Likely following improvement of lung expansion after removal 3.9 L with paracentesis, this happening in spite of not using BiPAP.    Suspect in large part related to atelectasis    Strongly encourage incentive spirometer to be use as much as possible    Up to chair and mobilize as much as possible.  He is currently in chair but laying down    Encourage physical therapy    If hypercapnia recurs consider trial of BiPAP with every sleep opportunity    The patient is on room air and with good saturations    I see no need for initiation of antibiotics for pneumonia since \" infiltrates\" are more likely to represent atelectasis    Please call if questions               Blanco Patton M.D.  Pulmonary, Critical Care and Sleep Medicine  [facility]   Pager: [phone]  Office:[phone]            "

## 2023-03-12 NOTE — PROGRESS NOTES
[facility]    Medicine Progress Note - Hospitalist Service    Date of Admission:  1/21/2023    Assessment & Plan   Blanco Osborne is a 57 year-old male admitted on 1/21/2023 as a transfer from [facility] for evaluation of loculated pleural effusion. He has extensive PMH including h/o liver transplant 2016 due to alcohol abuse, pAF on chronic anticoagulation, history of diabetes mellitus type 2, CVA, hypertension, CHRISTIAN on BiPAP.  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 He was recovering in [facility] and was transferred to [facility] for consideration of chest tube placement and possible intrapleural lysis for worsening effusion.     Acute metabolic encephalopathy with delirium, multifactorial  Hepatic encephalopathy  Chronic liver disease, history of liver transplant 2016  End-stage renal disease (ESRD), on hemodialysis (HD)  History of seizure, on Keppra and Vimpat  Waxing and waning mentation, coinciding with lactulose being held at [facility]  Earlier in hospital stay, remained confused and had pulled out tracheostomy tube, PICC line and pulled his dialysis catheter.  Lines replaced.  Palliative care consult 1/26, see note.  * Psychiatry consulted 2/2, see note, follow-up requested.    Continue to reorient as needed; maintain normal day/night, sleep wake cycles; minimize sedating/altering medications as able    Continue Lactulose to 10 mg BID, monitor for symptoms to have 2-3 Bms    Ongoing hemodialysis, as per nephrology, on M-W-F dialysis schedule; ongoing nephrology consult follow-up appreciated    2/20/23 Off restraints.Mentation has improved    2/21/23 -Fall this morningWorsening.Delirium. Ramelteon started.Psyciatry consult appreciated  2/22/23 Improved mentation     2/28:  Appreciate psychiatry evaluation: Increased Zyprexa to 5 mg twice daily and continuing 5 mg twice daily as needed.  Also continue Ramelteon 8 mg nightly     3/6, 3,7: Ct increased confusion . Hypercarbia. (respiratory acidosis)  - ct prn Bipap . Will get CT Chest  - Paracentesis to r/o SBP (negative )  - ct lactulose to ensure 3-4 loose stools/ day    3/8 CT head ordered, vbg and ammonia level  Continue prn bipap  Continue lactulose   CT HEAD no acute intrcranial abnormality    3/9 -VBG every 48 hours  Continue lactulose to have 2-3 bowel movements  Recheck ammonia level tomorrow  Bipap prn      3/10 VBG reviewed Ph wnl   Ammonial level 30    CBC and Bmp reviwed    3/11 Confusion improving  Encourage ambulation  Lactulose to have 2-3 BMS    Hypomagnesemia: On replacement protocol     Bilateral pleural effusions   Acute respiratory failure requiring tracheostomy  - resolved    Pneumonia  - resolved   ARDS  - resolved    Right pneumothorax - resolved   *Initial event was parainfluenza and strep pneumo pneumonia back in November 2022. Treated for ARDS. Had failed extubation x2 and tracheostomy performed on previous hospitalization. *Had additional complications of bilateral pneumothoraces as well as hydropneumothorax- pleural fluid grew candida parapsilosis  *Completed 4-week antifungal treatment. [facility] was successfully weaned from the ventilator.  *Recently there were concern for worsening effusion while at [facility] and had thoracentesis 01/13 which returned exudative without growth on cultures. CT chest/abdomen/pelvis on 01/18 demonstrated worsening effusions and concerns for infected parapneumonic effusions with possible SBP.  Multiple pulmonologist at [facility] reviewed CT scan recommended transfer to [facility] for consideration of chest tube placement and possible intrapleural lysis  *Thoracic surgery (Dr. Jackson) consulted during admission   *CT chest 1/22, small effusions on imaging and so chest tube was  not inserted.   ID consulted, see note 2/10.  *Patient pulled out his tracheostomy tube on the night 2/1-2/2 and is tolerating well without increased shortness of breath persistent cough or worsening hypoxia  * Chest x-ray 2/3 with cardiomegaly, chronic small bilateral pleural effusions, no pulmonary edema; right internal jugular dialysis catheter in place    Monitor respiratory status, recently stable on room air    Encourage incentive spirometry as able    Wound care previously consulted for tracheostomy site care, monitor for signs and symptoms of infection        Discontinued nystatin 3/01     3/4,3/5:  chest x-ray:Some increase in opacity at right base likely represents a combination of small right pleural effusion and atelectasis/infiltrate.  Normal WBC count of 5.4  -Encourage incentive spirometer and Acapella.  Monitor closely  3/7 CT Chest Slight improvement in right pleural effusion and pleural  thickening since the comparison study.  2.  Similar bilateral infiltrates and minimal left effusion.  3.  New wedge-shaped area of decreased attenuation in the spleen,  question a splenic infarct.    3/10 VBG well compensated, bipap prn    3/11 Consulted Pulmonology for co2 retention and chest infiltrates    3/12 VBG repeated.  Continue delirium protocol.  Encourage ambulation.  Promote sleep hygiene  Patient's mentation is improved.  Encourage patient to be moved in a wheelchair in the hallways.  Prn bipap      # Splenic infract ?  Wedge shaped area on CT scan chest and CT abdomen  Hematology consulted  Discussed plan with Dr Smith on 3/9 from Hematology/oncology consult appreciated recommended supportive managment  TTE no thrombus or vegetations   No abdominal pain CTM       S/p liver transplant 2016   Chronic immunosuppression, on tacrolimus  Cirrhosis with ascites  Subacute bacterial peritonitis (SBP), resolved  *Main manifestations of this are hepatic encephalopathy and ascites.  Hepatologist at [facility]  "felt that most likely reason for the cirrhosis was metabolic syndrome or alcohol intake.  There was no evidence of rejection on biopsy and there was some evidence of regeneration. Paracentesis done on 12/22/2022 showed lactobacillus indicating SBP, treated with Unasyn and Augmentin, finished 2-week course 1/12/2023.  Requires large-volume paracentesis periodically for recurring ascites.    *Paracentesis 1/13/2023 yielded about 7500 cc. Cultures NGTD. paracentesis on 1/24 with 4.8 L fluid removal  Paracentesis on 3/6/23 No SBP    Continue intermittent paracentesis as needed if develops symptomatic ascites    Monitor for signs and symptoms of recurrent spontaneous bacterial peritonitis     Further treatment for recurrent ascites with TIPS deferred to his Liver Transplant team and/or Hepatology, he has an appointment on 4/21/2023 with Hepatology, Dr. Simms    Continue Tacrolimus 1 mg BID, rifaximin 550 mg BID    Continue lactulose as ordered, titrate as needed to have 2-3 bms    GI consult as needed in hospital for new acute issues, otherwise as outpatient    Encourgae high protein diet      Having bowel movements.  Continue lactulose to have 2-3 bowel movements.  CT abdomen on March 9, 2023 showed small to moderate ascites      Goals of care   As per prior rounding provider, \"on 1/25 nursing had mentioned that patient had refused to undergo dialysis and want to stop care, palliative care was consulted.  Patient and his spouse denied wanting to stop care and wanted ongoing restorative care including full code\"    Monitor, Full Code status    -Needs placement to TCU     Diarrhea, improved, on lactulose for chronic liver disease  - C difficile negative on 1/31. Secondary to lactulose.  - discontinued rectal tube (2/12) as stools more formed    Continue lactulose with goal of 2 to 3 soft bowel movement in 24 hours     Paroxysmal atrial fibrillation  Chronic anticoagulation, apixaban  Remains in sinus rhythm, on " anticoagulation with apixaban indefinitely for stroke prophylaxis.      Monitor rhythm    Continue apixaban anticoagulation    Monitor hemoglobin, see below     Acute anemia  Pt has required intermittent transfusions for on-going anemia.  Anemia most likely secondary to critical illness/chronic disease, chronic renal disease.  Baseline hemoglobin around 7-8  Likely Epo resistance    Transfuse packed red blood cells to keep hemoglobin > 7    Epoetin lfa-epbx use as per nephrology    Hb 8.5 on 3/9/23    Hb 9.6 on 3/11/23    Hb 7.9 today and will repeat hb      # Hypercalcemia  Josue hypercalcemia of Immobility  Discussed with nephrology on 3/8/23  Hold VIT D  -Continue to hold Phos lo  Repeat Phosphorus levels in 3-4 days   Encourage ambulation     History PEA arrest   PEA arrest prior to his previous admission and 1 episode of PEA associated with desaturation on 12/20.  No structural cardiac disease.     Stable, continue cardiac Monitoring     Chronic Hypotension  In the past has developed baseline hypotension with cirrhosis and prolonged critical illness.  On hemodialysis.  Receiving midodrine and albumin during dailysis    Continue midodrine, as per nephrology      History of seizure   After hypoxic event, in the context of baseline multifactorial encephalopathy secondary to his ongoing critical illness and prior cardiac arrests and prior strokes and cirrhosis with hepatic encephalopathy. MRI of head of 12/20/22 reveals no PRES or leptomeningeal enhancement but scattered.  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   No recent seizure activity.    Continue levetiracetam, lacosamide     Seizure precautions    Outpatient follow-up with neurology, consult inpatient if needed     # ESRD  # Anemia due to renal disease  # Hypotension during dialysis  -Underwent dialysis  on 2/22/23 and was hypotensive and received midodrine and albumin  Receiving albumin with  dialysis        Diabetes mellitus type 2  *Hemoglobin A1c on November 2022 was 4.7  *By report, on Lantus insulin in the past.  Blood sugar checks and sliding scale insulin previously discontinued as has been stable without insulin needs    Monitor with periodic blood sugar checks as needed     Severe malnutrition, protein and calorie type  Moderate dysphagia  G-tube feedings    Continue with tube feeds via PEG tube    IR replaced the PEG tube on 2/8/2023, monitor    Nutritionist consulted and following for tube feeds    SLP following as well. Oral diet as per speech and language therapy (SLP)     2/20 Nutrition following for tube feeds     Underwent video swallow on 2/23/22     3/1: Change PPI to daily as per family's request  -Undergoing Prn tube feed  Encourage high-protein diet     Anxiety  Fluoxetine was discontinued as per psychiatry recommendation on 2/2 (see consult note for details)     Stable, monitor; comfort and reassurance offered during visit    Psychiatry follow-up         Diet: Room Service  Snacks/Supplements Adult: Other; Gelatein+ with brkfst and lunch, and magic cup with dinner (RD); With Meals  Adult Formula Bolus Feeding: Novasource Renal; Route: Gastrostomy; 3 times daily; Volume per Bolus: 240; mL(s); Continue to encourage after meal bolus at 80 mL/hr x 3 hrs, If patient consumes <50% of that meal  Combination Diet Regular Diet; Mildly Thick (level 2) (OK for fresh fruit (e.g., pineapple) per SLP)    DVT Prophylaxis: DOAC  Nixon Catheter: Not present  Lines: PRESENT      CVC Double Lumen Right External jugular Tunneled-Site Assessment: WDL      Cardiac Monitoring: None  Code Status: Full Code      Clinically Significant Risk Factors           # Hypercalcemia: Highest Ca = 11 mg/dL in last 2 days, will monitor as appropriate    # Hypoalbuminemia: Lowest albumin = 1.9 g/dL at 1/23/2023  6:38 AM, will monitor as appropriate   # Thrombocytopenia: Lowest platelets = 116 in last 2 days, will  monitor for bleeding          # Severe Malnutrition: based on nutrition assessment        Disposition Plan      Expected Discharge Date: 03/14/2023, 12:00 PM  Discharge Delays: Placement - LTC  Destination: inpatient rehabilitation facility  Discharge Comments: Dialysis pt MWF. Will need placement TCU. [facility] willing to accept once no sitter, SW to follow up when sitter free          Ashkan Hernandez MD  Hospitalist Service  [facility]  Securely message with [employer] (more info)  Text page via [employer] Paging/Directory   ______________________________________________________________________    Interval History   Confusion  Has improved  More engnaged in conversation  Encourage ambulation   Has improved appetite   Having BMS with lactulose        Physical Exam   Vital Signs: Temp: 97.3  F (36.3  C) Temp src: Oral BP: 110/71 Pulse: 96   Resp: 18 SpO2: 97 % O2 Device: None (Room air)    Weight: 175 lbs .72 oz    Physical Exam  Constitutional:       Comments: Chronically sick appearing   Pulmonary:      Effort: Pulmonary effort is normal. No respiratory distress.   Abdominal:      General: There is distension.      Palpations: Abdomen is soft.      Tenderness: There is no abdominal tenderness.   Musculoskeletal:      Right lower leg: No edema.      Left lower leg: No edema.   Neurological:      Comments: Confused  More enganged in conversation       Medical Decision Making     Data     I have personally reviewed the following data over the past 24 hrs:    3.5 (L)  \   7.9 (L)   / 116 (L)     137 101 61.5 (H) /  87   4.8 25 4.44 (H) \       ALT: 8 (L) AST: 18 AP: 200 (H) TBILI: 0.4   ALB: 2.5 (L) TOT PROTEIN: 6.9 LIPASE: N/A

## 2023-03-12 NOTE — PLAN OF CARE
Goal Outcome Evaluation:      Plan of Care Reviewed With: patient    Overall Patient Progress: no changeOverall Patient Progress: no change         SUMMARY: pleural effusion, ESRD, metabolic encephalopathy   DATE & TIME: 3/11/23, pm shift  Cognitive Concerns/ Orientation: A&disoriented to place, time and situations at time of assessment. Orientation fluctuates. Restless at times. Soft spoken, slow to respond.   BEHAVIOR & AGGRESSION TOOL COLOR: Green. Calm and cooperative this shift.  ABNL VS/O2: VSS on RA.   ABNL Labs: creatinine= 3.45, alkaline phos=224, ALT=8, hgb=9.6, platelets=138. Albumin 2.9  MOBILITY: Strong Ax2 with mark steady; Repositions self frequently in bed. Up to recliner today, PT exercises completed  PAIN MANAGMENT: Denies.  DIET: Regular diet w/ mildly thick liquids (level 2) with meds & meals, per speech continue plan of care at this time. Ate 50% of his dinner. No bolus tube feed needed this time.  BOWEL/BLADDER: Had 4 soft/ loose bm, was continent. Patient on schedule Lactulose. No urine, on hemodialysis. Continue to monitor Bms, needs x 3 per 24 hours per MD.   DRAIN/DEVICES: R. PIV SL. R chest CVC for hemodialysis. Double lumen PEG tube, clamped.   SKIN: Jaundiced, Scattered scabs and bruising. Blanchable redness to coccyx/intact. Abrasion left elbow and left neck, Mepilex in place.  TEST/PROCEDURES: Dialysis MWF.   D/C DATE: Pending, will need TCU placement when ready.   OTHER IMPORTANT INFO: Pulmonology consulted for CO2 retention concerns. Family at bedside, left around 5pm. Nephrology following.

## 2023-03-12 NOTE — PROGRESS NOTES
Plan of Care Reviewed With: patient    SUMMARY: pleural effusion, ESRD, metabolic encephalopathy   DATE & TIME: 3/10/23 Night  Cognitive Concerns/ Orientation: A&O x1- self. Restless at times. Soft spoken, slow to respond. Slept on and off through out the shift.   BEHAVIOR & AGGRESSION TOOL COLOR: Green  ABNL VS/O2: VSS, room air   ABNL Labs: Na 133; BUN 27.4; Creat 2.62; Alb 3.2; Alk phos 231; ALT 9  MOBILITY: Strong Assist-2 with Justyna Oro; Repositions self frequently in bed   PAIN MANAGMENT: Denies; PRN Tylenol  DIET: Regular diet w/ mildly thick liquids (level 2) with meds & meals. Bolus TF when not eating sufficiently  BOWEL/BLADDER: Continent, 2 times medium  BM this shift.   DRAIN/DEVICES: R PIV saline locked; R chest CVC for hemodialysis; PEG tube, clamped.   SKIN: Jaundiced, Scattered scabs and bruising. Blanchable redness to coccyx. Abrasion left elbow and left neck, Mepilex in place.  TEST/PROCEDURES: Dialysis MWF   D/C DATE: Pending, will need TCU placement when ready.   OTHER IMPORTANT INFO: Sitter at bedside. Pt restless through the night

## 2023-03-13 LAB
ALBUMIN SERPL BCG-MCNC: 3.1 G/DL (ref 3.5–5.2)
ALBUMIN SERPL ELPH-MCNC: 3 G/DL (ref 3.7–5.1)
ALP SERPL-CCNC: 182 U/L (ref 40–129)
ALPHA1 GLOB SERPL ELPH-MCNC: 0.4 G/DL (ref 0.2–0.4)
ALPHA2 GLOB SERPL ELPH-MCNC: 0.6 G/DL (ref 0.5–0.9)
ALT SERPL W P-5'-P-CCNC: 9 U/L (ref 10–50)
ANION GAP SERPL CALCULATED.3IONS-SCNC: 9 MMOL/L (ref 7–15)
AST SERPL W P-5'-P-CCNC: 21 U/L (ref 10–50)
B-GLOBULIN SERPL ELPH-MCNC: 0.9 G/DL (ref 0.6–1)
BILIRUB SERPL-MCNC: 0.4 MG/DL
BUN SERPL-MCNC: 36.2 MG/DL (ref 6–20)
CALCIUM SERPL-MCNC: 10.3 MG/DL (ref 8.6–10)
CHLORIDE SERPL-SCNC: 99 MMOL/L (ref 98–107)
CREAT SERPL-MCNC: 3 MG/DL (ref 0.67–1.17)
DEPRECATED HCO3 PLAS-SCNC: 28 MMOL/L (ref 22–29)
ERYTHROCYTE [DISTWIDTH] IN BLOOD BY AUTOMATED COUNT: 16.1 % (ref 10–15)
FERRITIN SERPL-MCNC: 252 NG/ML (ref 31–409)
GAMMA GLOB SERPL ELPH-MCNC: 2.2 G/DL (ref 0.7–1.6)
GFR SERPL CREATININE-BSD FRML MDRD: 23 ML/MIN/1.73M2
GLUCOSE SERPL-MCNC: 111 MG/DL (ref 70–99)
HCT VFR BLD AUTO: 28.7 % (ref 40–53)
HGB BLD-MCNC: 8.6 G/DL (ref 13.3–17.7)
IRON BINDING CAPACITY (ROCHE): 142 UG/DL (ref 240–430)
IRON SATN MFR SERPL: 26 % (ref 15–46)
IRON SERPL-MCNC: 37 UG/DL (ref 61–157)
M PROTEIN SERPL ELPH-MCNC: 0.1 G/DL
MAGNESIUM SERPL-MCNC: 1.6 MG/DL (ref 1.7–2.3)
MCH RBC QN AUTO: 30 PG (ref 26.5–33)
MCHC RBC AUTO-ENTMCNC: 30 G/DL (ref 31.5–36.5)
MCV RBC AUTO: 100 FL (ref 78–100)
PHOSPHATE SERPL-MCNC: 3.6 MG/DL (ref 2.5–4.5)
PLATELET # BLD AUTO: 129 10E3/UL (ref 150–450)
POTASSIUM SERPL-SCNC: 4.1 MMOL/L (ref 3.4–5.3)
PROT PATTERN SERPL ELPH-IMP: ABNORMAL
PROT SERPL-MCNC: 7.3 G/DL (ref 6.4–8.3)
PTH-INTACT SERPL-MCNC: 7 PG/ML (ref 15–65)
RBC # BLD AUTO: 2.87 10E6/UL (ref 4.4–5.9)
SODIUM SERPL-SCNC: 136 MMOL/L (ref 136–145)
WBC # BLD AUTO: 3.7 10E3/UL (ref 4–11)

## 2023-03-13 PROCEDURE — 250N000013 HC RX MED GY IP 250 OP 250 PS 637: Performed by: INTERNAL MEDICINE

## 2023-03-13 PROCEDURE — 250N000013 HC RX MED GY IP 250 OP 250 PS 637

## 2023-03-13 PROCEDURE — 80053 COMPREHEN METABOLIC PANEL: CPT | Performed by: STUDENT IN AN ORGANIZED HEALTH CARE EDUCATION/TRAINING PROGRAM

## 2023-03-13 PROCEDURE — 250N000013 HC RX MED GY IP 250 OP 250 PS 637: Performed by: HOSPITALIST

## 2023-03-13 PROCEDURE — 82523 COLLAGEN CROSSLINKS: CPT | Performed by: INTERNAL MEDICINE

## 2023-03-13 PROCEDURE — 250N000013 HC RX MED GY IP 250 OP 250 PS 637: Performed by: STUDENT IN AN ORGANIZED HEALTH CARE EDUCATION/TRAINING PROGRAM

## 2023-03-13 PROCEDURE — 36415 COLL VENOUS BLD VENIPUNCTURE: CPT | Performed by: INTERNAL MEDICINE

## 2023-03-13 PROCEDURE — 83735 ASSAY OF MAGNESIUM: CPT | Performed by: STUDENT IN AN ORGANIZED HEALTH CARE EDUCATION/TRAINING PROGRAM

## 2023-03-13 PROCEDURE — 250N000012 HC RX MED GY IP 250 OP 636 PS 637: Performed by: STUDENT IN AN ORGANIZED HEALTH CARE EDUCATION/TRAINING PROGRAM

## 2023-03-13 PROCEDURE — 84100 ASSAY OF PHOSPHORUS: CPT | Performed by: STUDENT IN AN ORGANIZED HEALTH CARE EDUCATION/TRAINING PROGRAM

## 2023-03-13 PROCEDURE — 85014 HEMATOCRIT: CPT | Performed by: STUDENT IN AN ORGANIZED HEALTH CARE EDUCATION/TRAINING PROGRAM

## 2023-03-13 PROCEDURE — 90937 HEMODIALYSIS REPEATED EVAL: CPT

## 2023-03-13 PROCEDURE — 90935 HEMODIALYSIS ONE EVALUATION: CPT | Performed by: INTERNAL MEDICINE

## 2023-03-13 PROCEDURE — 258N000003 HC RX IP 258 OP 636: Performed by: INTERNAL MEDICINE

## 2023-03-13 PROCEDURE — 99233 SBSQ HOSP IP/OBS HIGH 50: CPT | Performed by: HOSPITALIST

## 2023-03-13 PROCEDURE — 120N000001 HC R&B MED SURG/OB

## 2023-03-13 RX ORDER — GUAIFENESIN 600 MG/1
600 TABLET, EXTENDED RELEASE ORAL 2 TIMES DAILY
Status: DISCONTINUED | OUTPATIENT
Start: 2023-03-14 | End: 2023-04-15

## 2023-03-13 RX ADMIN — ACETAMINOPHEN 650 MG: 325 TABLET, FILM COATED ORAL at 06:34

## 2023-03-13 RX ADMIN — GUAIFENESIN 600 MG: 600 TABLET, EXTENDED RELEASE ORAL at 12:36

## 2023-03-13 RX ADMIN — SODIUM CHLORIDE 300 ML: 9 INJECTION, SOLUTION INTRAVENOUS at 10:27

## 2023-03-13 RX ADMIN — OLANZAPINE 5 MG: 5 TABLET, ORALLY DISINTEGRATING ORAL at 12:36

## 2023-03-13 RX ADMIN — WHITE PETROLATUM: 1.75 OINTMENT TOPICAL at 12:49

## 2023-03-13 RX ADMIN — PANTOPRAZOLE SODIUM 40 MG: 40 TABLET, DELAYED RELEASE ORAL at 12:36

## 2023-03-13 RX ADMIN — MIDODRINE HYDROCHLORIDE 10 MG: 5 TABLET ORAL at 18:43

## 2023-03-13 RX ADMIN — ACETAMINOPHEN 650 MG: 325 TABLET, FILM COATED ORAL at 18:49

## 2023-03-13 RX ADMIN — APIXABAN 5 MG: 5 TABLET, FILM COATED ORAL at 20:33

## 2023-03-13 RX ADMIN — SODIUM CHLORIDE 250 ML: 9 INJECTION, SOLUTION INTRAVENOUS at 10:27

## 2023-03-13 RX ADMIN — Medication 1 CAPSULE: at 12:36

## 2023-03-13 RX ADMIN — RIFAXIMIN 550 MG: 550 TABLET ORAL at 15:37

## 2023-03-13 RX ADMIN — APIXABAN 5 MG: 5 TABLET, FILM COATED ORAL at 12:36

## 2023-03-13 RX ADMIN — FOLIC ACID 1 MG: 1 TABLET ORAL at 12:47

## 2023-03-13 RX ADMIN — RAMELTEON 8 MG: 8 TABLET, FILM COATED ORAL at 20:33

## 2023-03-13 RX ADMIN — GUAIFENESIN 600 MG: 600 TABLET, EXTENDED RELEASE ORAL at 20:33

## 2023-03-13 RX ADMIN — LEVETIRACETAM 1000 MG: 500 TABLET, FILM COATED ORAL at 23:47

## 2023-03-13 RX ADMIN — LEVETIRACETAM 1000 MG: 500 TABLET, FILM COATED ORAL at 00:04

## 2023-03-13 RX ADMIN — LACOSAMIDE 100 MG: 100 TABLET, FILM COATED ORAL at 12:37

## 2023-03-13 RX ADMIN — ACETAMINOPHEN 650 MG: 325 TABLET, FILM COATED ORAL at 12:57

## 2023-03-13 RX ADMIN — OLANZAPINE 5 MG: 5 TABLET, ORALLY DISINTEGRATING ORAL at 20:33

## 2023-03-13 RX ADMIN — TACROLIMUS 1 MG: 1 CAPSULE ORAL at 12:37

## 2023-03-13 RX ADMIN — LACTULOSE 10 G: 10 SOLUTION ORAL at 12:35

## 2023-03-13 RX ADMIN — MIDODRINE HYDROCHLORIDE 10 MG: 5 TABLET ORAL at 12:36

## 2023-03-13 RX ADMIN — LACOSAMIDE 100 MG: 100 TABLET, FILM COATED ORAL at 23:47

## 2023-03-13 RX ADMIN — LACOSAMIDE 100 MG: 100 TABLET, FILM COATED ORAL at 00:04

## 2023-03-13 RX ADMIN — TACROLIMUS 1 MG: 1 CAPSULE ORAL at 20:33

## 2023-03-13 RX ADMIN — MIDODRINE HYDROCHLORIDE 10 MG: 5 TABLET ORAL at 07:53

## 2023-03-13 ASSESSMENT — ACTIVITIES OF DAILY LIVING (ADL)
ADLS_ACUITY_SCORE: 65
ADLS_ACUITY_SCORE: 61
ADLS_ACUITY_SCORE: 63
ADLS_ACUITY_SCORE: 65

## 2023-03-13 NOTE — PROGRESS NOTES
Renal Medicine Progress Note            Assessment/Plan:     Assessment:  1) ESRD on MWF chronic dialysis via right tunneled CVC    Blood pressure, hemodynamics/BP's appear good.      2) Hypercalcemia:    yesterday calcium 11.0, albumin 2.5. Estimated corrected calcium around 12.2. Has had general upward trend last two months. No PTH value in our system. 3/7 vit D 28. Alk phos  200. Recent variability related to some improvement with dialysis treatments.     Likely hypercalcemia related to immobalization. Will check PTH, bone turnover markers to confirm. If worsens, can consider dose zoledronic acid or denosumab.    3) Anemia:    Hgb yesterday 7.9. Last iron labs in December. No maintenence iron being given. Getting retacrit 10,000 units most runs (last week 3/6 and 3/8). Will recheck iron labs to assess need for parenteral iron. Continuing epogen/retacrit.     Noted Disposition ongoing      Plan/Recs:  1) HD today per MWF schedule  2) add on labs today: PTH, CTX, P1NP, ferritin, iron sats    Torsten Montaño DO  [facility]  Office: [phone]  Cell: [phone]        Interval History:      Pt seen on dialysis. Resting with 1:1 sitter present. Chart reviewed, no event overnight. Awaiting still TCU placement. S/p pulm consult yesterday.            Medications and Allergies:       - MEDICATION INSTRUCTIONS for Dialysis Patients -   Does not apply See Admin Instructions     sodium chloride 0.9%  250 mL Intravenous Once in dialysis/CRRT     sodium chloride 0.9%  300 mL Hemodialysis Machine Once     sodium chloride 0.9%  250 mL Intravenous Once in dialysis/CRRT     sodium chloride 0.9%  300 mL Hemodialysis Machine Once     apixaban ANTICOAGULANT  5 mg Oral BID     folic acid  1 mg Oral or Feeding Tube Daily     guaiFENesin  600 mg Oral BID     lacosamide  100 mg Oral Q12H     lactulose  10 g Oral BID     levETIRAcetam  1,000 mg Oral Q24H     lidocaine  1 patch Transdermal Q24H     lidocaine   Transdermal Q8H  BIJU     midodrine  10 mg Oral or Feeding Tube TID w/meals     mineral oil-hydrophilic petrolatum   Topical Daily     multivitamin RENAL  1 capsule Oral Daily     - MEDICATION INSTRUCTIONS -   Does not apply Once     - MEDICATION INSTRUCTIONS -   Does not apply Once     [Held by provider] nystatin  500,000 Units Swish & Spit 4x Daily     OLANZapine zydis  5 mg Oral BID     pantoprazole  40 mg Oral QAM AC     ramelteon  8 mg Oral QPM     rifaximin  550 mg Oral or Feeding Tube BID     sodium chloride (PF)  10-30 mL Intracatheter Q8H     sodium chloride (PF)  3 mL Intracatheter Q8H     tacrolimus  1 mg Oral Q12H      No Known Allergies         Physical Exam:   Vitals were reviewed  /71   Pulse 75   Temp 98.2  F (36.8  C) (Oral)   Resp 10   Wt 79.4 kg (175 lb 0.7 oz)   SpO2 95%   BMI 23.74 kg/m      Wt Readings from Last 3 Encounters:   03/10/23 79.4 kg (175 lb 0.7 oz)   01/21/23 82.4 kg (181 lb 11.2 oz)   12/28/22 96 kg (211 lb 10.3 oz)       Intake/Output Summary (Last 24 hours) at 3/13/2023 0854  Last data filed at 3/12/2023 1747  Gross per 24 hour   Intake 730 ml   Output 600 ml   Net 130 ml       GENERAL APPEARANCE: sleepy, arousble  HEENT:  Eyes/ears/nose/neck grossly normal  RESP: lungs clear ant/lat.  CV: RRR, nl S1/S2, no m/r/g   ABDOMEN: o/s/nt/nd, bs present  EXTREMITIES/SKIN: no c/c/rashes/lesions; no edema  LINES: right CVC present, no visible abnormalities           Data:     BMP  Recent Labs   Lab 03/12/23  0756 03/11/23  0846 03/10/23  1457 03/09/23  0942    136 133* 135*   POTASSIUM 4.8 4.3 4.1 4.4   CHLORIDE 101 99 99 99   KELLY 11.0* 10.9* 9.8 10.7*   CO2 25 25 27 26   BUN 61.5* 41.0* 27.4* 51.5*   CR 4.44* 3.45* 2.62* 3.84*   GLC 87 91 117* 100*     CBC  Recent Labs   Lab 03/12/23  0756 03/11/23  0846 03/10/23  1457 03/09/23  0942   WBC 3.5* 4.0 3.7* 3.2*   HGB 7.9* 9.6* 9.5* 8.5*   HCT 26.2* 32.3* 32.2* 28.3*    100 99 99   * 138* 146* 115*     Lab Results   Component  Value Date    AST 18 03/12/2023    ALT 8 (L) 03/12/2023    ALKPHOS 200 (H) 03/12/2023    BILITOTAL 0.4 03/12/2023    CHANDLER 64 (H) 03/12/2023     Lab Results   Component Value Date    INR 1.36 (H) 12/21/2022       Attestation:  I have reviewed today's vital signs, notes, medications, labs and imaging.    DO [facility] - Nephrology  Office: [phone]  Cell: [phone]

## 2023-03-13 NOTE — PLAN OF CARE
Goal Outcome Evaluation:      Plan of Care Reviewed With: patient    SUMMARY: Pleural effusion/ESRD/metabolic encephalopathy  DATE & TIME: 03/12/2023 Night   Cognitive Concerns/ Orientation : Alert to self; restless, picking at skin   BEHAVIOR & AGGRESSION TOOL COLOR: Green   ABNL VS/O2: Refused vitals overnight, room air  MOBILITY: Strong assist-2 Justyna Stedy/gait belt or lift; able to reposition self in bed  PAIN MANAGMENT: PRN Tylenol given x1 for general pain  DIET: Regular + Mildly thickened liquids (level 2); if not eating > 50% of meal, then bolus through PEG  BOWEL/BLADDER: Incontinent of stool at times; pt on hemodialysis  ABNL LAB/BG:  BUN 61.5; Creat 4.44; Alb 2.5; Alk phos 200; ALT 8; WBC 3.5; Hgb 7.9; hematocrit 26.2; Platelets 116  DRAIN/DEVICES: R chest CVC, double lumen for hemodialysis; R PIV saline locked; PEG tube, clamped  SKIN: Scattered bruising and scabs; L elbow dried scab covered with mepilex  D/C DATE: Pending placement to TCU  OTHER IMPORTANT INFO: Hemodialysis MWF

## 2023-03-13 NOTE — PROGRESS NOTES
Potassium   Date Value Ref Range Status   03/12/2023 4.8 3.4 - 5.3 mmol/L Final   12/29/2022 3.4 3.4 - 5.3 mmol/L Final   04/20/2020 3.9 3.4 - 5.3 mmol/L Final     Potassium POCT   Date Value Ref Range Status   01/19/2023 3.6 3.5 - 5.0 mmol/L Final     Hemoglobin   Date Value Ref Range Status   03/12/2023 7.9 (L) 13.3 - 17.7 g/dL Final   04/20/2020 14.3 13.3 - 17.7 g/dL Final     Creatinine   Date Value Ref Range Status   03/12/2023 4.44 (H) 0.67 - 1.17 mg/dL Final   04/20/2020 1.06 0.66 - 1.25 mg/dL Final     Urea Nitrogen   Date Value Ref Range Status   03/12/2023 61.5 (H) 6.0 - 20.0 mg/dL Final   12/29/2022 46 (H) 7 - 30 mg/dL Final   04/20/2020 23 7 - 30 mg/dL Final     Sodium   Date Value Ref Range Status   03/12/2023 137 136 - 145 mmol/L Final   04/20/2020 139 133 - 144 mmol/L Final     INR   Date Value Ref Range Status   12/21/2022 1.36 (H) 0.85 - 1.15 Final   10/07/2019 1.20 (H) 0.86 - 1.14 Final       DIALYSIS PROCEDURE NOTE  Hepatitis status of previous patient on machine log was checked and verified ok to use with this patients hepatitis status.  Patient dialyzed for 3 hrs. on a K2 bath with a net fluid removal of  1.5L.  A BFR of 350 ml/min was obtained via a CVC.      The treatment plan was discussed with Dr. Montaño during the treatment.    Total heparin received during the treatment: 0 units.     Line flushed, clamped and capped with heparin 1:1000 1.9 mL (1900 units) per lumen    Meds  given: none   Complications: none      Person educated: pre-tx. Knowledge base minimal. Barriers to learning: confusion. Educated on procedure via verbal mode. Patient/family verbalized understanding. Pt prefers verbal education style.     ICEBOAT? Timeout performed pre-treatment  I: Patient was identified using 2 identifiers  C:  Consent Signed Yes  E: Equipment preventative maintenance is current and dialysis delivery system OK to use  B: Hepatitis B Surface Antigen: negative; Draw Date: 3/1/23      Hepatitis B  Surface Antibody: susceptible; Draw Date: 3/9/23  O: Dialysis orders present and complete prior to treatment  A: Vascular access verified and assessed prior to treatment  T: Treatment was performed at a clinically appropriate time  ?: Patient was allowed to ask questions and address concerns prior to treatment  See Adult Hemodialysis flowsheet in [facility] for further details and post assessment.  Machine water alarm in place and functioning. Transducer pods intact and checked every 15min.   Pt returned via bed.  Chlorine/Chloramine water system checked every 4 hours.  Outpatient Dialysis at *not established    Patient repositioned every 2 hours during the treatment.  Post treatment report given to YENNIFER Kwong RN regarding 1.5L of fluid removed, last BP of 118/81, and patient pain rating of 9/10.

## 2023-03-13 NOTE — PLAN OF CARE
Goal Outcome Evaluation:      Plan of Care Reviewed With: patient    Overall Patient Progress: no changeOverall Patient Progress: no change         SUMMARY: pleural effusion, ESRD, metabolic encephalopathy   DATE & TIME: 3/12/23, PM shift  Cognitive Concerns/ Orientation:A & disorientated to place, time and situation.  BEHAVIOR & AGGRESSION TOOL COLOR: Green  ABNL VS/O2: VSS/RA   ABNL Labs: Creatinine=4.44, Albumin=2.5, alk phos=200, ALT=8, WBC=3.5, hgb=7.9, platelets=116, mag=1.8 (WDL, mag check tomorrow morning), CO2=49 (trending down), Ammonia=64 (trending up, MD aware)   MOBILITY: Strong Assist-2 with Justyna Oro; patient was up to chair for dinnner and at 7pm. Repositions self frequently in bed   PAIN MANAGMENT: PRN Tylenol given x 1 for low back pain. Refused heat/cold pack offered.  DIET: Regular diet w/ mildly thick liquids (level 2) with meds & meals. Ate 50% of dinner. Bolus TF when not eating sufficiently-not needed today  BOWEL/BLADDER: Incontinent of bowel at times; Had 3 soft Bms (goal x 3 Bms within 24 hours). Minimal to no urine-hemodialysis patient   DRAIN/DEVICES: R. PIV saline locked; R chest CVC for hemodialysis; PEG tube, clamped/dressing CDI.   SKIN: Jaundiced, Scattered scabs and bruising. Blanchable redness to coccyx. Abrasion left elbow and left neck, mepilex in place CDI. PRN Aquaphor applied on legs and arms due to dry skin.  TEST/PROCEDURES: Dialysis MWF. Dialysis tomorrow.  D/C DATE: Pending, will need TCU placement when ready.   OTHER IMPORTANT INFO: Per pulmonology, encourage frequent IS use. MALDONADO. Used IS x 2 with encouragement this shift, can go up to level 500, fair tolerance.

## 2023-03-13 NOTE — PLAN OF CARE
SUMMARY: Pleural effusion/ESRD/metabolic encephalopathy  DATE & TIME: 03/13/2023: [id number]  Cognitive Concerns/ Orientation : A&O to self, place. restless, picking at skin over noc, per report.  BEHAVIOR & AGGRESSION TOOL COLOR: Green   ABNL VS/O2: VSS. On RA.   MOBILITY: Strong assist-2 Justyna Stedy/gait belt or lift.   PAIN MANAGMENT: PRN Tylenol   DIET: Regular + Mildly thickened liquids (level 2); if not eating at least 50% of meal, then tube feeding bolus through PEG.   BOWEL/BLADDER: Incontinent of stool at times; had 4 BMs this shift so far. Hold lactulose if > 3 stools per day. Pt on hemodialysis  ABNL LAB/BG:  Creat 3.0; Alk phos 182; ALT 9; WBC 3.7, Hgb 8.6; Platelets 129  DRAIN/DEVICES: R chest CVC/double lumen for hemodialysis; R PIV saline locked; PEG tube/clamped  SKIN: Scattered bruising and scabs; skin tear L neck; L elbow dried scab covered with mepilex  D/C DATE: Pending placement to TCU  OTHER IMPORTANT INFO: Hemodialysis MWF (had dialysis today, 1.5 L fluid removed, per report). Ate 50% of  Meals/tolerating ( offered to give bolus tube feeding as ordered since pt didn't eat breakfast but family declined.)

## 2023-03-13 NOTE — PROGRESS NOTES
[facility]    Medicine Progress Note - Hospitalist Service    Date of Admission:  1/21/2023    Assessment & Plan   Blanco Osborne is a 57 year-old male admitted on 1/21/2023 as a transfer from [facility] for evaluation of loculated pleural effusion. He has extensive PMH including h/o liver transplant 2016 due to alcohol abuse, pAF on chronic anticoagulation, history of diabetes mellitus type 2, CVA, hypertension, CHRISTIAN on BiPAP.  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 He was recovering in [facility] and was transferred to [facility] for consideration of chest tube placement and possible intrapleural lysis for worsening effusion.     Acute metabolic encephalopathy with delirium, multifactorial  Hepatic encephalopathy  Chronic liver disease, history of liver transplant 2016  End-stage renal disease (ESRD), on hemodialysis (HD)  History of seizure, on Keppra and Vimpat  Waxing and waning mentation, coinciding with lactulose being held at [facility]  Earlier in hospital stay, remained confused and had pulled out tracheostomy tube, PICC line and pulled his dialysis catheter.  Lines replaced.  Palliative care consult 1/26, see note.  * Psychiatry consulted, recent follow-up 2/21, 2/28, see notes.    Continue to reorient as needed; maintain normal day/night, sleep wake cycles; minimize sedating/altering medications as able    Continue Lactulose to 10 mg BID, monitor for symptoms to have 2-3 bowel movements per day    Ongoing hemodialysis, as per nephrology, 3X/week dialysis schedule; ongoing nephrology consult follow-up appreciated    Continue ramelteon 8 mg at bedtime, olanzapine (Zyprexa) 5 mg bid and as needed dosing    Reconsult psychiatry as needed     Hypomagnesemia    Monitor,  replacement as per nephrology, on dialysis     Bilateral pleural effusions   Acute respiratory failure requiring tracheostomy  - resolved    Pneumonia  - resolved   ARDS  - resolved    Right pneumothorax - resolved   *Initial event was parainfluenza and strep pneumo pneumonia back in November 2022. Treated for ARDS. Had failed extubation x2 and tracheostomy performed on previous hospitalization. *Had additional complications of bilateral pneumothoraces as well as hydropneumothorax- pleural fluid grew candida parapsilosis  *Completed 4-week antifungal treatment. While in LTACH he was successfully weaned from the ventilator.  *Previously there were concerns for worsening effusion while at [facility] and had thoracentesis 01/13 which returned exudative without growth on cultures. CT chest/abdomen/pelvis on 01/18 demonstrated worsening effusions and concerns for infected parapneumonic effusions with possible SBP.  Multiple pulmonologist at [facility] reviewed CT scan recommended transfer to [facility] for consideration of chest tube placement and possible intrapleural lysis  *Thoracic surgery (Dr. Jackson) consulted during admission   *CT chest 1/22, small effusions on imaging and so chest tube was not inserted.   ID consulted, see note 2/10.  *Patient pulled out his tracheostomy tube on the night 2/1-2/2 and is tolerating well without increased shortness of breath persistent cough or worsening hypoxia  * Chest x-ray 2/3 with cardiomegaly, chronic small bilateral pleural effusions, no pulmonary edema; right internal jugular dialysis catheter in place  * 3/7/23 CT Chest:  1.  Slight improvement in right pleural effusion and pleural  thickening since the comparison study.  2.  Similar bilateral infiltrates and minimal left effusion.  3.  New wedge-shaped area of decreased attenuation in the spleen,  question a splenic infarct.   * 3/12 Pulmonary consult, see note, concerns of hypercapnia, atelectasis, with consideration of BiPAP  trial; no recommendations for antibiotics with infiltrates felt to be atelectasis    Monitor respiratory status, recently stable on room air    Encourage incentive spirometry as able    Wound care previously consulted for tracheostomy site care, monitor for signs and symptoms of infection    Splenic infarct  *3/9 CT abdomen:   1. Splenomegaly with a small probable infarct superiorly, infarct is  new since the most recent available abdomen comparison.   2. Small to moderate ascites.  *Hematology consult 3/8 for splenomegaly and question of splenic infarct, Dr. Smith.  Splenomegaly felt secondary to liver disease with suspicion of splenic infarct low given absence of abdominal pain.  Ongoing anticoagulation continued, Eliquis.  See consult note for details.    Monitor, hematology follow-up as needed        S/p liver transplant 2016   Chronic immunosuppression, on tacrolimus  Cirrhosis with ascites  Subacute bacterial peritonitis (SBP), resolved  *Main manifestations of this are hepatic encephalopathy and ascites.  Hepatologist at [facility] felt that most likely reason for the cirrhosis was metabolic syndrome or alcohol intake.  There was no evidence of rejection on biopsy and there was some evidence of regeneration. Paracentesis done on 12/22/2022 showed lactobacillus indicating SBP, treated with Unasyn and Augmentin, finished 2-week course 1/12/2023.  Requires large-volume paracentesis periodically for recurring ascites.    *Paracentesis 1/13/2023 yielded about 7500 cc. Cultures NGTD. paracentesis on 1/24 with 4.8 L fluid removal  Paracentesis on 3/6/23 No SBP    Continue intermittent paracentesis as needed if develops symptomatic ascites    Monitor for signs and symptoms of recurrent spontaneous bacterial peritonitis     Further treatment for recurrent ascites with TIPS deferred to his Liver Transplant team and/or Hepatology, he has an appointment on 4/21/2023 with Hepatology, Dr. Simms    Continue Tacrolimus 1 mg  "BID, rifaximin 550 mg BID    Continue lactulose as ordered, titrate    GI consult as needed in hospital for new acute issues, otherwise as outpatient    Encourgage protein diet      Goals of care   As per prior rounding provider, \"on 1/25 nursing had mentioned that patient had refused to undergo dialysis and want to stop care, palliative care was consulted.  Patient and his spouse denied wanting to stop care and wanted ongoing restorative care including full code\"    Monitor, Full Code status    Needs placement to transitional care unit (TCU) on discharge w/ dialysis     Diarrhea, improved, on lactulose for chronic liver disease  *C difficile negative on 1/31, likely secondary to lactulose.  -*Discontinued rectal tube (2/12) as stools more formed    Continue lactulose with goal of 2 to 3 soft bowel movement in 24 hours     Paroxysmal atrial fibrillation  Chronic anticoagulation, apixaban  Recently has remained in sinus rhythm, on anticoagulation with apixaban indefinitely for stroke prophylaxis.      Monitor rhythm    Continue apixaban anticoagulation    Monitor hemoglobin, see below     Acute anemia  Pt has required intermittent transfusions for on-going anemia.  Anemia most likely secondary to critical illness/chronic disease, chronic renal disease.  Baseline hemoglobin around 7-8  Likely Epo resistance    Transfuse packed red blood cells to keep hemoglobin > 7    Epoetin lfa-epbx use as per nephrology    Hemoglobin 7.9 3/12, 9.6 on 3/11     Hypercalcemia  Likley hypercalcemia of immobility, previously discussed with nephrology on 3/8/23.  Future use of vitamin D as well as PhosLo as per nephrology.     History PEA arrest   PEA arrest prior to his previous admission and 1 episode of PEA associated with desaturation on 12/20.  No structural cardiac disease.     Stable, continue cardiac monitoring     Chronic Hypotension  In the past has developed baseline hypotension with cirrhosis and prolonged critical illness.  " On hemodialysis.  Has received midodrine and albumin during dialysis, as per nephrology      History of seizure   After hypoxic event, in the context of baseline multifactorial encephalopathy secondary to his ongoing critical illness and prior cardiac arrests and prior strokes and cirrhosis with hepatic encephalopathy. MRI of head of 12/20/22 reveals no PRES or leptomeningeal enhancement but scattered.  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   No recent seizure activity.    Continue levetiracetam, lacosamide     Seizure precautions    Outpatient follow-up with neurology, reconsult inpatient if needed     End-stage renal disease (ESRD) on dialysis  Anemia due to renal disease  Hypotension during dialysis  *Underwent dialysis  on 2/22/23 and was hypotensive and received midodrine and albumin; management as per nephrology if recurrent     Diabetes mellitus type 2  *Hemoglobin A1c on November 2022 was 4.7  *By report, on Lantus insulin in the past.  Blood sugar checks and sliding scale insulin previously discontinued as has been stable without insulin needs    Monitor with periodic blood sugar checks as needed     Severe malnutrition, protein and calorie type  Moderate dysphagia  G-tube feedings  *Video swallow on 2/23, see report    Tube feeds via PEG tube as ordered    IR replaced the PEG tube on 2/8/2023, monitor    Nutritionist consulted and following for tube feeds; higher protein diet    SLP following as well. Oral diet as per speech and language therapy (SLP)     2/20 Nutrition following for tube feeds    Continue PPI, Protonix     Anxiety  Fluoxetine was discontinued as per psychiatry recommendation on 2/2 (see consult note for details)     Stable, monitor; comfort and reassurance offered during visits    Psychiatry follow-up as needed, help appreciated    Weakness and deconditioning    Ongoing physical therapy (PT), occupational therapy  (OT)    Transitional care unit (TCU) on future hospital discharge    Disposition     Estimated length of stay 4 to 5 days    Anticipated discharge to transitional care unit (TCU), skilled care with dialysis available; social work consulted     Total time spent caring for patient today thus far 50 minutes.  Total time spent reviewing medical records, interviewing and examining patient, ordering and reviewing test results, reviewing subspecialty consults, communicating with nursing staff, implementing clinical decision making and coordinating care, documenting in chart, and reviewing care plan at the bedside providing patient education.       Diet: Room Service  Snacks/Supplements Adult: Other; Gelatein+ with brkfst and lunch, and magic cup with dinner (RD); With Meals  Adult Formula Bolus Feeding: Novasource Renal; Route: Gastrostomy; 3 times daily; Volume per Bolus: 240; mL(s); Continue to encourage after meal bolus at 80 mL/hr x 3 hrs, If patient consumes <50% of that meal  Combination Diet Regular Diet; Mildly Thick (level 2) (OK for fresh fruit (e.g., pineapple) per SLP)    DVT Prophylaxis: DOAC  Nixon Catheter: Not present  Lines: PRESENT      CVC Double Lumen Right External jugular Tunneled-Site Assessment: WDL      Cardiac Monitoring: None  Code Status: Full Code      Clinically Significant Risk Factors           # Hypercalcemia: Highest Ca = 11 mg/dL in last 2 days, will monitor as appropriate    # Hypoalbuminemia: Lowest albumin = 1.9 g/dL at 1/23/2023  6:38 AM, will monitor as appropriate   # Thrombocytopenia: Lowest platelets = 116 in last 2 days, will monitor for bleeding          # Severe Malnutrition: based on nutrition assessment        Disposition Plan      Expected Discharge Date: 03/14/2023, 12:00 PM  Discharge Delays: Placement - LTC  Destination: inpatient rehabilitation facility  Discharge Comments: Dialysis pt MWF. Will need placement TCU. [facility] willing to accept once no sitter, SW to follow  up when sitter free          Renny Erwin MD  Hospitalist Service  [facility]  Securely message with [employer] (more info)  Text page via [employer] Paging/Directory   ______________________________________________________________________    Interval History   Lying in bed in the dialysis unit, appearing comfortable, pleasant and interactive, resting.  Disoriented to place when asked where he is.  Calm, cooperative, following simple commands without difficulty.  No report of pain.    Physical Exam   Vital Signs: Temp: 97.8  F (36.6  C) Temp src: Oral BP: 117/78 Pulse: 85   Resp: 21 SpO2: 96 % O2 Device: None (Room air)    Weight: 175 lbs .72 oz    Lying in bed, resting comfortably, on dialysis  Lungs mild to moderate inspiratory effort without wheezes or crackles  Heart S1-S2 with regular rhythm, no rubs or gallops  Abdomen soft, nontender, G-tube in place  Extremities without significant edema  Mental status quiet, reserved, resting, disoriented to place; calm and cooperative    Medical Decision Making             Data     I have personally reviewed the following data over the past 24 hrs:    Ferritin:  N/A % Retic:  N/A LDH:  N/A       Imaging results reviewed over the past 24 hrs:   No results found for this or any previous visit (from the past 24 hour(s)).

## 2023-03-14 ENCOUNTER — APPOINTMENT (OUTPATIENT)
Dept: PHYSICAL THERAPY | Facility: CLINIC | Age: 58
DRG: 981 | End: 2023-03-14
Attending: STUDENT IN AN ORGANIZED HEALTH CARE EDUCATION/TRAINING PROGRAM
Payer: COMMERCIAL

## 2023-03-14 LAB
ALBUMIN SERPL BCG-MCNC: 2.8 G/DL (ref 3.5–5.2)
ALP SERPL-CCNC: 177 U/L (ref 40–129)
ALT SERPL W P-5'-P-CCNC: 8 U/L (ref 10–50)
ANION GAP SERPL CALCULATED.3IONS-SCNC: 10 MMOL/L (ref 7–15)
AST SERPL W P-5'-P-CCNC: 20 U/L (ref 10–50)
BILIRUB SERPL-MCNC: 0.4 MG/DL
BUN SERPL-MCNC: 56.4 MG/DL (ref 6–20)
CALCIUM SERPL-MCNC: 11.1 MG/DL (ref 8.6–10)
CHLORIDE SERPL-SCNC: 100 MMOL/L (ref 98–107)
COLLAGEN CTX SERPL-MCNC: 3440 PG/ML
CREAT SERPL-MCNC: 3.96 MG/DL (ref 0.67–1.17)
DEPRECATED HCO3 PLAS-SCNC: 28 MMOL/L (ref 22–29)
ERYTHROCYTE [DISTWIDTH] IN BLOOD BY AUTOMATED COUNT: 16.2 % (ref 10–15)
GFR SERPL CREATININE-BSD FRML MDRD: 17 ML/MIN/1.73M2
GLUCOSE SERPL-MCNC: 116 MG/DL (ref 70–99)
HCT VFR BLD AUTO: 29.1 % (ref 40–53)
HGB BLD-MCNC: 8.6 G/DL (ref 13.3–17.7)
MAGNESIUM SERPL-MCNC: 1.8 MG/DL (ref 1.7–2.3)
MCH RBC QN AUTO: 29.9 PG (ref 26.5–33)
MCHC RBC AUTO-ENTMCNC: 29.6 G/DL (ref 31.5–36.5)
MCV RBC AUTO: 101 FL (ref 78–100)
PLATELET # BLD AUTO: 128 10E3/UL (ref 150–450)
POTASSIUM SERPL-SCNC: 4.8 MMOL/L (ref 3.4–5.3)
PROT SERPL-MCNC: 7 G/DL (ref 6.4–8.3)
RBC # BLD AUTO: 2.88 10E6/UL (ref 4.4–5.9)
SODIUM SERPL-SCNC: 138 MMOL/L (ref 136–145)
WBC # BLD AUTO: 4 10E3/UL (ref 4–11)

## 2023-03-14 PROCEDURE — 97110 THERAPEUTIC EXERCISES: CPT | Mod: GP

## 2023-03-14 PROCEDURE — 83735 ASSAY OF MAGNESIUM: CPT | Performed by: HOSPITALIST

## 2023-03-14 PROCEDURE — 99232 SBSQ HOSP IP/OBS MODERATE 35: CPT | Performed by: INTERNAL MEDICINE

## 2023-03-14 PROCEDURE — 250N000013 HC RX MED GY IP 250 OP 250 PS 637: Performed by: STUDENT IN AN ORGANIZED HEALTH CARE EDUCATION/TRAINING PROGRAM

## 2023-03-14 PROCEDURE — 250N000013 HC RX MED GY IP 250 OP 250 PS 637

## 2023-03-14 PROCEDURE — 250N000013 HC RX MED GY IP 250 OP 250 PS 637: Performed by: INTERNAL MEDICINE

## 2023-03-14 PROCEDURE — 36415 COLL VENOUS BLD VENIPUNCTURE: CPT | Performed by: STUDENT IN AN ORGANIZED HEALTH CARE EDUCATION/TRAINING PROGRAM

## 2023-03-14 PROCEDURE — 85027 COMPLETE CBC AUTOMATED: CPT | Performed by: STUDENT IN AN ORGANIZED HEALTH CARE EDUCATION/TRAINING PROGRAM

## 2023-03-14 PROCEDURE — 80053 COMPREHEN METABOLIC PANEL: CPT | Performed by: STUDENT IN AN ORGANIZED HEALTH CARE EDUCATION/TRAINING PROGRAM

## 2023-03-14 PROCEDURE — 120N000001 HC R&B MED SURG/OB

## 2023-03-14 PROCEDURE — 97530 THERAPEUTIC ACTIVITIES: CPT | Mod: GP

## 2023-03-14 PROCEDURE — 250N000013 HC RX MED GY IP 250 OP 250 PS 637: Performed by: HOSPITALIST

## 2023-03-14 PROCEDURE — 250N000012 HC RX MED GY IP 250 OP 636 PS 637: Performed by: STUDENT IN AN ORGANIZED HEALTH CARE EDUCATION/TRAINING PROGRAM

## 2023-03-14 PROCEDURE — 99233 SBSQ HOSP IP/OBS HIGH 50: CPT | Performed by: HOSPITALIST

## 2023-03-14 RX ADMIN — ACETAMINOPHEN 650 MG: 325 TABLET, FILM COATED ORAL at 16:55

## 2023-03-14 RX ADMIN — OLANZAPINE 5 MG: 5 TABLET, ORALLY DISINTEGRATING ORAL at 20:31

## 2023-03-14 RX ADMIN — PANTOPRAZOLE SODIUM 40 MG: 40 TABLET, DELAYED RELEASE ORAL at 08:54

## 2023-03-14 RX ADMIN — LACOSAMIDE 100 MG: 100 TABLET, FILM COATED ORAL at 11:11

## 2023-03-14 RX ADMIN — TACROLIMUS 1 MG: 1 CAPSULE ORAL at 08:55

## 2023-03-14 RX ADMIN — FOLIC ACID 1 MG: 1 TABLET ORAL at 08:55

## 2023-03-14 RX ADMIN — MIDODRINE HYDROCHLORIDE 10 MG: 5 TABLET ORAL at 08:54

## 2023-03-14 RX ADMIN — RIFAXIMIN 550 MG: 550 TABLET ORAL at 20:31

## 2023-03-14 RX ADMIN — LACOSAMIDE 100 MG: 100 TABLET, FILM COATED ORAL at 23:51

## 2023-03-14 RX ADMIN — LIDOCAINE 1 PATCH: 560 PATCH PERCUTANEOUS; TOPICAL; TRANSDERMAL at 08:55

## 2023-03-14 RX ADMIN — GUAIFENESIN 600 MG: 600 TABLET, EXTENDED RELEASE ORAL at 08:54

## 2023-03-14 RX ADMIN — TACROLIMUS 1 MG: 1 CAPSULE ORAL at 20:31

## 2023-03-14 RX ADMIN — MIDODRINE HYDROCHLORIDE 10 MG: 5 TABLET ORAL at 11:11

## 2023-03-14 RX ADMIN — Medication 1 CAPSULE: at 08:54

## 2023-03-14 RX ADMIN — RAMELTEON 8 MG: 8 TABLET, FILM COATED ORAL at 20:32

## 2023-03-14 RX ADMIN — LEVETIRACETAM 1000 MG: 500 TABLET, FILM COATED ORAL at 23:51

## 2023-03-14 RX ADMIN — LACTULOSE 10 G: 10 SOLUTION ORAL at 11:10

## 2023-03-14 RX ADMIN — GUAIFENESIN 600 MG: 600 TABLET, EXTENDED RELEASE ORAL at 20:31

## 2023-03-14 RX ADMIN — WHITE PETROLATUM: 1.75 OINTMENT TOPICAL at 11:13

## 2023-03-14 RX ADMIN — OLANZAPINE 5 MG: 5 TABLET, ORALLY DISINTEGRATING ORAL at 08:54

## 2023-03-14 RX ADMIN — MIDODRINE HYDROCHLORIDE 10 MG: 5 TABLET ORAL at 18:01

## 2023-03-14 RX ADMIN — APIXABAN 5 MG: 5 TABLET, FILM COATED ORAL at 20:32

## 2023-03-14 RX ADMIN — APIXABAN 5 MG: 5 TABLET, FILM COATED ORAL at 08:54

## 2023-03-14 RX ADMIN — RIFAXIMIN 550 MG: 550 TABLET ORAL at 08:54

## 2023-03-14 ASSESSMENT — ACTIVITIES OF DAILY LIVING (ADL)
ADLS_ACUITY_SCORE: 61
ADLS_ACUITY_SCORE: 65
ADLS_ACUITY_SCORE: 61

## 2023-03-14 NOTE — PROGRESS NOTES
Renal Medicine Progress Note            Assessment/Plan:     Assessment:  1) ESRD on MWF chronic dialysis via right tunneled CVC    Blood pressure, hemodynamics/BP's appear good. Tolerated 1.5kg UF yesterday.         2) Hypercalcemia:    yesterday calcium after dialysis corrects to about 11.0. Last weeks opinion by nephrology likely related to immobalization which I agreee with. PTH remains suppressed at 7. No meds to suppress this (active vit D). Bone turnover markers pending to confirm. If progresses to values 12.0 or higher, will treat with dose zoledronic acid or denosumab.     3) Anemia:    Hgb yesterday 8.6.  Getting retacrit 10,000 units most runs (last week 3/6 and 3/8). Adequate iron stores with ferritin 252 and sats 26%.      Noted Disposition ongoing        Plan/Recs:  1) HD tomorrow  per MWF schedule  2) EPO with HD  3) UF as tolerated    Torsten Montaño DO  [facility]  Office: [phone]  Cell: [phone]        Interval History:      s/p HD yesterday, 1.5kg UF done on 3 hr run via CVC. Spouse at bedside today.Stilll needing sitter, reported aggitation overnight.               Medications and Allergies:       - MEDICATION INSTRUCTIONS for Dialysis Patients -   Does not apply See Admin Instructions     apixaban ANTICOAGULANT  5 mg Oral BID     folic acid  1 mg Oral or Feeding Tube Daily     guaiFENesin  600 mg Oral BID     lacosamide  100 mg Oral Q12H     lactulose  10 g Oral BID     levETIRAcetam  1,000 mg Oral Q24H     lidocaine  1 patch Transdermal Q24H     lidocaine   Transdermal Q8H BIJU     midodrine  10 mg Oral or Feeding Tube TID w/meals     mineral oil-hydrophilic petrolatum   Topical Daily     multivitamin RENAL  1 capsule Oral Daily     [Held by provider] nystatin  500,000 Units Swish & Spit 4x Daily     OLANZapine zydis  5 mg Oral BID     pantoprazole  40 mg Oral QAM AC     ramelteon  8 mg Oral QPM     rifaximin  550 mg Oral or Feeding Tube BID     sodium chloride (PF)  10-30 mL  Intracatheter Q8H     sodium chloride (PF)  3 mL Intracatheter Q8H     tacrolimus  1 mg Oral Q12H      No Known Allergies         Physical Exam:   Vitals were reviewed  /64 (BP Location: Right arm)   Pulse 85   Temp 97.8  F (36.6  C)   Resp 16   Wt 79.4 kg (175 lb 0.7 oz)   SpO2 93%   BMI 23.74 kg/m      Wt Readings from Last 3 Encounters:   03/10/23 79.4 kg (175 lb 0.7 oz)   01/21/23 82.4 kg (181 lb 11.2 oz)   12/28/22 96 kg (211 lb 10.3 oz)       Intake/Output Summary (Last 24 hours) at 3/14/2023 0953  Last data filed at 3/14/2023 0900  Gross per 24 hour   Intake 960 ml   Output --   Net 960 ml       GENERAL APPEARANCE: sleepy, arousble  HEENT:  Eyes/ears/nose/neck grossly normal  RESP: lungs clear ant/lat.  CV: RRR, nl S1/S2, no m/r/g   ABDOMEN: o/s/nt/nd, bs present  EXTREMITIES/SKIN: no c/c/rashes/lesions; no edema  LINES: right CVC present, no visible abnormalities            Data:     BMP  Recent Labs   Lab 03/13/23  1454 03/12/23  0756 03/11/23  0846 03/10/23  1457    137 136 133*   POTASSIUM 4.1 4.8 4.3 4.1   CHLORIDE 99 101 99 99   KELLY 10.3* 11.0* 10.9* 9.8   CO2 28 25 25 27   BUN 36.2* 61.5* 41.0* 27.4*   CR 3.00* 4.44* 3.45* 2.62*   * 87 91 117*     CBC  Recent Labs   Lab 03/13/23  1454 03/12/23  0756 03/11/23  0846 03/10/23  1457   WBC 3.7* 3.5* 4.0 3.7*   HGB 8.6* 7.9* 9.6* 9.5*   HCT 28.7* 26.2* 32.3* 32.2*    100 100 99   * 116* 138* 146*     Lab Results   Component Value Date    AST 21 03/13/2023    ALT 9 (L) 03/13/2023    ALKPHOS 182 (H) 03/13/2023    BILITOTAL 0.4 03/13/2023    CHANDLER 64 (H) 03/12/2023     Lab Results   Component Value Date    INR 1.36 (H) 12/21/2022       Attestation:  I have reviewed today's vital signs, notes, medications, labs and imaging.    DO [facility] - Nephrology  Office: [phone]  Cell: [phone]

## 2023-03-14 NOTE — PROGRESS NOTES
[facility]    Medicine Progress Note - Hospitalist Service    Date of Admission:  1/21/2023    Assessment & Plan   Blanco Osborne is a 57 year-old male admitted on 1/21/2023 as a transfer from [facility] for evaluation of loculated pleural effusion. He has extensive PMH including h/o liver transplant 2016 due to alcohol abuse, pAF on chronic anticoagulation, history of diabetes mellitus type 2, CVA, hypertension, CHRISTIAN on BiPAP.  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 He was recovering in [facility] and was transferred to [facility] for consideration of chest tube placement and possible intrapleural lysis for worsening effusion.     Acute metabolic encephalopathy with delirium, multifactorial  Hepatic encephalopathy  Chronic liver disease, history of liver transplant 2016  End-stage renal disease (ESRD), on hemodialysis (HD)  History of seizure, on Keppra and Vimpat  Waxing and waning mentation, coinciding with lactulose being held at [facility]  Earlier in hospital stay, remained confused and had pulled out tracheostomy tube, PICC line and pulled his dialysis catheter.  Lines replaced.  Palliative care consult 1/26, see note.  * Psychiatry consulted, recent follow-up 2/21, 2/28, see notes.    Complicated, prolonged hospital course with concerns of hepatic encephalopathy, end-stage renal disease on dialysis, past PEA arrest, seizure disorder, and chronic liver disease with prior liver transplant.  Mental status continues to fluctuate.  One-to-one sitter as needed for safety and supervision.  Continue to reorient as needed.  Maintain normal day/night cycles and sleep-wake cycles.  Minimize sedating medications as able.    Continue Lactulose to 10 mg BID, monitor for symptoms to have 2-3  bowel movements per day    Ongoing hemodialysis, as per nephrology, 3X/week dialysis schedule; ongoing nephrology consult follow-up appreciated    Continue ramelteon 8 mg at bedtime, olanzapine (Zyprexa) 5 mg bid and as needed dosing    Reconsult psychiatry as needed     Hypomagnesemia    Monitor, replacement as per nephrology, on dialysis     Bilateral pleural effusions   Acute respiratory failure requiring tracheostomy  - resolved    Pneumonia  - resolved   ARDS  - resolved    Right pneumothorax - resolved   *Initial event was parainfluenza and strep pneumo pneumonia back in November 2022. Treated for ARDS. Had failed extubation x2 and tracheostomy performed on previous hospitalization. *Had additional complications of bilateral pneumothoraces as well as hydropneumothorax- pleural fluid grew candida parapsilosis  *Completed 4-[facility] antifungal treatment. [facility]  *Previously there were concerns for worsening effusion while at [facility] and had thoracentesis 01/13 which returned exudative without growth on cultures. CT chest/abdomen/pelvis on 01/18 demonstrated worsening effusions and concerns for infected parapneumonic effusions with possible SBP.  Multiple pulmonologist at [facility] reviewed CT scan recommended transfer to [facility] for consideration of chest tube placement and possible intrapleural lysis  *Thoracic surgery (Dr. Jackson) consulted during admission   *CT chest 1/22, small effusions on imaging and so chest tube was not inserted.   ID consulted, see note 2/10.  *Patient pulled out his tracheostomy tube on the night 2/1-2/2 and is tolerating well without increased shortness of breath persistent cough or worsening hypoxia  * Chest x-ray 2/3 with cardiomegaly, chronic small bilateral pleural effusions, no pulmonary edema; right internal jugular dialysis catheter in place  * 3/7/23 CT Chest:  1.  Slight improvement in right pleural effusion and  pleural  thickening since the comparison study.  2.  Similar bilateral infiltrates and minimal left effusion.  3.  New wedge-shaped area of decreased attenuation in the spleen,  question a splenic infarct.   * 3/12 Pulmonary consult, see note, concerns of hypercapnia, atelectasis, with consideration of BiPAP trial; no recommendations for antibiotics with infiltrates felt to be atelectasis    Monitor respiratory status, recently stable on room air; occasional cough reported    Encourage incentive spirometry as able    Wound care previously consulted for tracheostomy site care, monitor for signs and symptoms of infection, stable    Splenic infarct  *3/9 CT abdomen:   1. Splenomegaly with a small probable infarct superiorly, infarct is  new since the most recent available abdomen comparison.   2. Small to moderate ascites.  *Hematology consult 3/8 for splenomegaly and question of splenic infarct, Dr. Smith.  Splenomegaly felt secondary to liver disease with suspicion of splenic infarct low given absence of abdominal pain.  Ongoing anticoagulation continued, Eliquis.  See consult note for details.    Monitor, hematology follow-up as needed     S/p liver transplant 2016   Chronic immunosuppression, on tacrolimus  Cirrhosis with ascites, intermittent paracenteses as needed  Subacute bacterial peritonitis (SBP), resolved  *Main manifestations of this are hepatic encephalopathy and ascites.  Hepatologist at [facility] felt that most likely reason for the cirrhosis was metabolic syndrome or alcohol intake.  There was no evidence of rejection on biopsy and there was some evidence of regeneration. Paracentesis done on 12/22/2022 showed lactobacillus indicating SBP, treated with Unasyn and Augmentin, finished 2-week course 1/12/2023.  Requires large-volume paracentesis periodically for recurring ascites.    *Paracentesis 1/13/2023 yielded about 7500 cc. Cultures NGTD. paracentesis on 1/24 with 4.8 L fluid removal  Paracentesis  "on 3/6/23 No SBP    Continue intermittent paracentesis as needed if develops symptomatic ascites    Monitor for signs and symptoms of recurrent spontaneous bacterial peritonitis     Further treatment for recurrent ascites with TIPS deferred to his Liver Transplant team and/or Hepatology, he has an appointment on 4/21/2023 with Hepatology, Dr. Simms    Continue Tacrolimus 1 mg BID, rifaximin 550 mg BID    Continue lactulose as ordered, titrate    GI consult as needed in hospital for new acute issues, otherwise as outpatient    Encourgage protein diet      Goals of care   As per prior rounding provider, \"on 1/25 nursing had mentioned that patient had refused to undergo dialysis and want to stop care, palliative care was consulted.  Patient and his spouse denied wanting to stop care and wanted ongoing restorative care including full code\"    Monitor, Full Code status    Needs placement to transitional care unit (TCU) on discharge w/ dialysis     Diarrhea, improved, on lactulose for chronic liver disease  *C difficile negative on 1/31, likely secondary to lactulose.  *Discontinued rectal tube (2/12) as stools more formed    Continue lactulose with goal of 2 to 3 soft bowel movement in 24 hours     Paroxysmal atrial fibrillation  Chronic anticoagulation, apixaban  Recently has remained in sinus rhythm, on anticoagulation with apixaban indefinitely for stroke prophylaxis.      Monitor rhythm    Continue apixaban/Eliquis anticoagulation, monitor for signs/symptoms of bleeding    Monitor hemoglobin, see below     Acute anemia  Pt has required intermittent transfusions for on-going anemia.  Anemia most likely secondary to critical illness/chronic disease, chronic renal disease.  Baseline hemoglobin around 7-8  Likely Epo resistance    Transfuse packed red blood cells to keep hemoglobin > 7    Epoetin lfa-epbx use as per nephrology    Hemoglobin 8.6 3/14, 7.9 3/12, 9.6 on 3/11     Hypercalcemia  Josue hypercalcemia of " immobility, previously discussed with nephrology on 3/8/23.  Future use of vitamin D as well as PhosLo as per nephrology.     History PEA arrest   PEA arrest prior to his previous admission and 1 episode of PEA associated with desaturation on 12/20.  No structural cardiac disease.     Stable, continue cardiac monitoring     Chronic hypotension  In the past has developed baseline hypotension with cirrhosis and prolonged critical illness.  On hemodialysis.  Has received midodrine and albumin during dialysis, as per nephrology      History of seizure   After hypoxic event, in the context of baseline multifactorial encephalopathy secondary to his ongoing critical illness and prior cardiac arrests and prior strokes and cirrhosis with hepatic encephalopathy. MRI of head of 12/20/22 reveals no PRES or leptomeningeal enhancement but scattered.  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   No recent seizure activity.    Continue levetiracetam, lacosamide     Seizure precautions    Outpatient follow-up with neurology, reconsult inpatient if needed     End-stage renal disease (ESRD) on dialysis  Anemia due to renal disease  Hypotension during dialysis  *Underwent dialysis  on 2/22/23 and was hypotensive and received midodrine and albumin; management as per nephrology if recurrent     Diabetes mellitus type 2  *Hemoglobin A1c on November 2022 was 4.7  *By report, on Lantus insulin in the past.  Blood sugar checks and sliding scale insulin previously discontinued as has been stable without insulin needs    Monitor with periodic blood sugar checks as needed     Severe malnutrition, protein and calorie type  Moderate dysphagia  G-tube feedings  *Video swallow on 2/23, see report    Tube feeds via PEG tube as ordered, reassess daily/weekly, Nutrition consulted    IR replaced the PEG tube on 2/8/2023, monitor    Nutritionist consulted and following for tube feeds; higher protein  diet    SLP following as well. Oral diet as per speech and language therapy (SLP)     Continue PPI, Protonix     Anxiety  Fluoxetine was discontinued as per psychiatry recommendation on 2/2 (see consult note for details)     Stable, monitor; comfort and reassurance offered during visits    Psychiatry follow-up as needed, help appreciated    Weakness and deconditioning    Ongoing physical therapy (PT), occupational therapy (OT)    Transitional care unit (TCU) on future hospital discharge    Disposition     Estimated length of stay 4 to 5 days    Anticipated discharge to transitional care unit (TCU), skilled care with dialysis available; social work consulted    Patient's wife, Ofe, called and updated with care plan 3/14       Diet: Room Service  Snacks/Supplements Adult: Other; Gelatein+ with brkfst and lunch, and magic cup with dinner (RD); With Meals  Adult Formula Bolus Feeding: Novasource Renal; Route: Gastrostomy; 3 times daily; Volume per Bolus: 240; mL(s); Continue to encourage after meal bolus at 80 mL/hr x 3 hrs, If patient consumes <50% of that meal  Combination Diet Regular Diet; Mildly Thick (level 2) (OK for fresh fruit (e.g., pineapple) per SLP)    DVT Prophylaxis: DOAC  Nixon Catheter: Not present  Lines: PRESENT      CVC Double Lumen Right External jugular Tunneled-Site Assessment: WDL      Cardiac Monitoring: None  Code Status: Full Code      Clinically Significant Risk Factors           # Hypercalcemia: Highest Ca = 11.1 mg/dL in last 2 days, will monitor as appropriate  # Hypomagnesemia: Lowest Mg = 1.6 mg/dL in last 2 days, will replace as needed   # Hypoalbuminemia: Lowest albumin = 1.9 g/dL at 1/23/2023  6:38 AM, will monitor as appropriate   # Thrombocytopenia: Lowest platelets = 128 in last 2 days, will monitor for bleeding          # Severe Malnutrition: based on nutrition assessment        Disposition Plan      Expected Discharge Date: 03/15/2023, 12:00 PM  Discharge Delays: Placement -  "LTC  Destination: inpatient rehabilitation facility  Discharge Comments: Dialysis pt MWF. Will need placement TCU. [facility] willing to accept once no sitter, SW to follow up when sitter free          Renny Erwin MD  Hospitalist Service  [facility]  Securely message with [employer] (more info)  Text page via [employer] Paging/Directory   ______________________________________________________________________    Interval History   Lying in bed, appearing fatigued, intermittently restless requiring a sitter with regard to safety and supervision.  Mild low back pain reported.  No increased dyspnea.  Overall feels \"a lot stronger but not strong enough\".  Occasional cough reported.  Pleasant and interactive today.    Physical Exam   Vital Signs: Temp: 97.8  F (36.6  C) Temp src: Axillary BP: 110/69 Pulse: 85   Resp: 16 SpO2: 93 % O2 Device: None (Room air)    Weight: 175 lbs .72 oz    Lying in bed, appearing fatigued, pleasant and interactive  Lungs mild to moderate inspiratory effort with diminished breath sounds lower lung fields, stable, no wheezes or crackles  Heart S1-S2 with regular rhythm, no rubs or gallops  Abdomen soft, nontender, G-tube in place  Extremities without edema  Skin warm and dry    Medical Decision Making             Data     I have personally reviewed the following data over the past 24 hrs:    4.0  \   8.6 (L)   / 128 (L)     138 100 56.4 (H) /  116 (H)   4.8 28 3.96 (H) \       ALT: 8 (L) AST: 20 AP: 177 (H) TBILI: 0.4   ALB: 2.8 (L) TOT PROTEIN: 7.0 LIPASE: N/A       Ferritin:  N/A % Retic:  N/A LDH:  N/A       Imaging results reviewed over the past 24 hrs:   No results found for this or any previous visit (from the past 24 hour(s)).   Recent Labs   Lab 03/14/23  1033 03/13/23  1454 03/12/23  0756 03/11/23  0846 03/10/23  1457   HGB 8.6* 8.6* 7.9* 9.6* 9.5*     "

## 2023-03-14 NOTE — PLAN OF CARE
Prolonged hospital course with concerns of hepatic encephalopathy, end-stage renal disease on dialysis, past PEA arrest, seizure disorder, and chronic liver disease with prior liver transplant.  DATE & TIME: 03- AM shift    Cognitive Concerns/ Orientation : A/Ox1-2; orientation fluctuates. Disoriented to time/place. Requires attendant for safety-pt restless and pulls at lines at times.   BEHAVIOR & AGGRESSION TOOL COLOR: Green, calm and cooperative. Restless at times but redirectable.    ABNL VS/O2: VSS on RA. Unlabored breathing. Infrequent cough noted.   MOBILITY: Up with assist of two and mark steady. Up to the chair for meals. T/R q2hr when in bed.   PAIN MANAGMENT: c/o lower back pain, lidocaine patch applied.   DIET: Regular diet with mildly thickened liquids. Encourage TF if pt eats less than 50% of the meal. Ate most of his breakfast and working on late lunch. Takes pills whole with thickened liquids.   BOWEL/BLADDER: continent of bowel this shift, BM x 2. Pt is on lactulose BID, evening dose should be held as pt reached his BM goal for today (had 3 bms). Dialysis pt, no urine output this shift.   ABNL LAB/BG: Cr 3.96; Hg 8.6; Platelets 128  DRAIN/DEVICES: R chest CVC/double lumen for hemodialysis; R PIV saline locked; PEG tube/clamped  SKIN: Scattered bruising and scabs; skin tear L neck covered with mepilex;   TESTS/PROCEDURES: n/a  D/C DATE: Pending placement, SW is following  Discharge Barriers:   OTHER IMPORTANT INFO: Family is visiting. Dialysis MWF.

## 2023-03-14 NOTE — PROGRESS NOTES
Care Management Follow Up    Length of Stay (days): 52    Expected Discharge Date: 03/15/2023     Concerns to be Addressed: adjustment to diagnosis/illness, care coordination/care conferences, discharge planning     Patient plan of care discussed at interdisciplinary rounds: Yes    TCU/LTC     Anticipated Discharge Services:    Anticipated Discharge DME:      Patient/family educated on Medicare website which has current facility and service quality ratings:    Education Provided on the Discharge Plan:    Patient/Family in Agreement with the Plan: yes    Referrals Placed by CM/SW:    Private pay costs discussed: Not applicable    Additional Information:  PAULIE infomred by  that patient is likely over income/asset requirements for [facility].     Addendum; PAULIE spoke with PT to inquire whether they feel patient would benefit from CU prior to LTC. therapies will meet with patient to determine.    Addendum: PAULIE spoke with PT who indicated patient is appropriate for TCU. Once patient is off sitter 24 hours sw will send referral to [facility].      ADILENE Crowley    [facility]

## 2023-03-14 NOTE — PLAN OF CARE
Goal Outcome Evaluation:       SUMMARY: Pleural effusion/ESRD/metabolic encephalopathy  DATE & TIME: 03/13/2023: 8898-0184  Cognitive Concerns/ Orientation : A&O to self only. restless, picking at skin the other noc, per report.  BEHAVIOR & AGGRESSION TOOL COLOR: Green   ABNL VS/O2: VSS. On RA.   MOBILITY: Strong assist-2 Justyna Stedy/gait belt or lift.   PAIN MANAGMENT: Denies pain   DIET: Regular + Mildly thickened liquids (level 2); if not eating at least 50% of meal, then tube feeding bolus through PEG.   BOWEL/BLADDER: Incontinent of stool at times; had 3 BMs so far today. Hold lactulose if > 3 stools per day. Pt on hemodialysis  ABNL LAB/BG:  Creat 3.0; Alk phos 182; ALT 9; WBC 3.7, Hgb 8.6; Platelets 129  DRAIN/DEVICES: R chest CVC/double lumen for hemodialysis; R PIV saline locked; PEG tube/clamped  SKIN: Scattered bruising and scabs; skin tear L neck; L elbow dried scab covered with mepilex  D/C DATE: Pending placement to TCU  OTHER IMPORTANT INFO: Hemodialysis MWF (had dialysis yesterday, 1.5 L fluid removed, per report).  Ate 50% of meals/tolerating (offered to give bolus tube feeding as ordered since pt didn't eat breakfast yesterday but family declined.)

## 2023-03-15 LAB
ERYTHROCYTE [DISTWIDTH] IN BLOOD BY AUTOMATED COUNT: 16 % (ref 10–15)
HCT VFR BLD AUTO: 28.7 % (ref 40–53)
HGB BLD-MCNC: 8.7 G/DL (ref 13.3–17.7)
MAGNESIUM SERPL-MCNC: 1.8 MG/DL (ref 1.7–2.3)
MCH RBC QN AUTO: 30.1 PG (ref 26.5–33)
MCHC RBC AUTO-ENTMCNC: 30.3 G/DL (ref 31.5–36.5)
MCV RBC AUTO: 99 FL (ref 78–100)
PINP SER-MCNC: 208 UG/L
PLATELET # BLD AUTO: 137 10E3/UL (ref 150–450)
PTH RELATED PROT SERPL-SCNC: 8.2 PMOL/L
RBC # BLD AUTO: 2.89 10E6/UL (ref 4.4–5.9)
WBC # BLD AUTO: 4.3 10E3/UL (ref 4–11)

## 2023-03-15 PROCEDURE — 250N000013 HC RX MED GY IP 250 OP 250 PS 637: Performed by: INTERNAL MEDICINE

## 2023-03-15 PROCEDURE — 120N000001 HC R&B MED SURG/OB

## 2023-03-15 PROCEDURE — 99232 SBSQ HOSP IP/OBS MODERATE 35: CPT | Performed by: HOSPITALIST

## 2023-03-15 PROCEDURE — 250N000011 HC RX IP 250 OP 636: Performed by: INTERNAL MEDICINE

## 2023-03-15 PROCEDURE — 634N000001 HC RX 634: Performed by: INTERNAL MEDICINE

## 2023-03-15 PROCEDURE — 250N000013 HC RX MED GY IP 250 OP 250 PS 637: Performed by: STUDENT IN AN ORGANIZED HEALTH CARE EDUCATION/TRAINING PROGRAM

## 2023-03-15 PROCEDURE — 90935 HEMODIALYSIS ONE EVALUATION: CPT | Performed by: INTERNAL MEDICINE

## 2023-03-15 PROCEDURE — 250N000013 HC RX MED GY IP 250 OP 250 PS 637: Performed by: HOSPITALIST

## 2023-03-15 PROCEDURE — 250N000013 HC RX MED GY IP 250 OP 250 PS 637

## 2023-03-15 PROCEDURE — 258N000003 HC RX IP 258 OP 636: Performed by: INTERNAL MEDICINE

## 2023-03-15 PROCEDURE — 85041 AUTOMATED RBC COUNT: CPT | Performed by: STUDENT IN AN ORGANIZED HEALTH CARE EDUCATION/TRAINING PROGRAM

## 2023-03-15 PROCEDURE — 250N000012 HC RX MED GY IP 250 OP 636 PS 637: Performed by: STUDENT IN AN ORGANIZED HEALTH CARE EDUCATION/TRAINING PROGRAM

## 2023-03-15 PROCEDURE — 90937 HEMODIALYSIS REPEATED EVAL: CPT

## 2023-03-15 PROCEDURE — 83735 ASSAY OF MAGNESIUM: CPT | Performed by: HOSPITALIST

## 2023-03-15 RX ORDER — LANOLIN ALCOHOL/MO/W.PET/CERES
6 CREAM (GRAM) TOPICAL
Status: DISCONTINUED | OUTPATIENT
Start: 2023-03-15 | End: 2023-03-26

## 2023-03-15 RX ADMIN — HEPARIN SODIUM 1900 UNITS: 1000 INJECTION INTRAVENOUS; SUBCUTANEOUS at 10:26

## 2023-03-15 RX ADMIN — RIFAXIMIN 550 MG: 550 TABLET ORAL at 20:18

## 2023-03-15 RX ADMIN — EPOETIN ALFA-EPBX 10000 UNITS: 10000 INJECTION, SOLUTION INTRAVENOUS; SUBCUTANEOUS at 10:21

## 2023-03-15 RX ADMIN — APIXABAN 5 MG: 5 TABLET, FILM COATED ORAL at 07:48

## 2023-03-15 RX ADMIN — APIXABAN 5 MG: 5 TABLET, FILM COATED ORAL at 20:18

## 2023-03-15 RX ADMIN — OLANZAPINE 5 MG: 5 TABLET, ORALLY DISINTEGRATING ORAL at 20:19

## 2023-03-15 RX ADMIN — LACOSAMIDE 100 MG: 100 TABLET, FILM COATED ORAL at 12:22

## 2023-03-15 RX ADMIN — ACETAMINOPHEN 650 MG: 325 TABLET, FILM COATED ORAL at 12:23

## 2023-03-15 RX ADMIN — SODIUM CHLORIDE 250 ML: 9 INJECTION, SOLUTION INTRAVENOUS at 10:24

## 2023-03-15 RX ADMIN — TACROLIMUS 1 MG: 1 CAPSULE ORAL at 20:19

## 2023-03-15 RX ADMIN — ACETAMINOPHEN 650 MG: 325 TABLET, FILM COATED ORAL at 17:32

## 2023-03-15 RX ADMIN — WHITE PETROLATUM: 1.75 OINTMENT TOPICAL at 08:00

## 2023-03-15 RX ADMIN — TACROLIMUS 1 MG: 1 CAPSULE ORAL at 07:48

## 2023-03-15 RX ADMIN — MIDODRINE HYDROCHLORIDE 10 MG: 5 TABLET ORAL at 12:22

## 2023-03-15 RX ADMIN — PANTOPRAZOLE SODIUM 40 MG: 40 TABLET, DELAYED RELEASE ORAL at 07:48

## 2023-03-15 RX ADMIN — MELATONIN TAB 3 MG 6 MG: 3 TAB at 02:26

## 2023-03-15 RX ADMIN — FOLIC ACID 1 MG: 1 TABLET ORAL at 12:24

## 2023-03-15 RX ADMIN — OLANZAPINE 5 MG: 5 TABLET, ORALLY DISINTEGRATING ORAL at 07:47

## 2023-03-15 RX ADMIN — RAMELTEON 8 MG: 8 TABLET, FILM COATED ORAL at 20:18

## 2023-03-15 RX ADMIN — GUAIFENESIN 600 MG: 600 TABLET, EXTENDED RELEASE ORAL at 07:48

## 2023-03-15 RX ADMIN — HEPARIN SODIUM 1900 UNITS: 1000 INJECTION INTRAVENOUS; SUBCUTANEOUS at 10:25

## 2023-03-15 RX ADMIN — SODIUM CHLORIDE 300 ML: 9 INJECTION, SOLUTION INTRAVENOUS at 10:24

## 2023-03-15 RX ADMIN — LACTULOSE 10 G: 10 SOLUTION ORAL at 07:52

## 2023-03-15 RX ADMIN — GUAIFENESIN 600 MG: 600 TABLET, EXTENDED RELEASE ORAL at 20:19

## 2023-03-15 RX ADMIN — LACTULOSE 10 G: 10 SOLUTION ORAL at 20:18

## 2023-03-15 RX ADMIN — LIDOCAINE 1 PATCH: 560 PATCH PERCUTANEOUS; TOPICAL; TRANSDERMAL at 07:59

## 2023-03-15 RX ADMIN — RIFAXIMIN 550 MG: 550 TABLET ORAL at 07:47

## 2023-03-15 RX ADMIN — MIDODRINE HYDROCHLORIDE 10 MG: 5 TABLET ORAL at 07:48

## 2023-03-15 RX ADMIN — Medication 1 CAPSULE: at 12:22

## 2023-03-15 ASSESSMENT — ACTIVITIES OF DAILY LIVING (ADL)
ADLS_ACUITY_SCORE: 65
ADLS_ACUITY_SCORE: 54
ADLS_ACUITY_SCORE: 65
ADLS_ACUITY_SCORE: 54
ADLS_ACUITY_SCORE: 55
ADLS_ACUITY_SCORE: 65
ADLS_ACUITY_SCORE: 55
ADLS_ACUITY_SCORE: 55
ADLS_ACUITY_SCORE: 65
ADLS_ACUITY_SCORE: 65
ADLS_ACUITY_SCORE: 61
ADLS_ACUITY_SCORE: 55

## 2023-03-15 NOTE — PROGRESS NOTES
[facility]    Medicine Progress Note - Hospitalist Service    Date of Admission:  1/21/2023    Assessment & Plan   Blanco Osborne is a 57 year-old male admitted on 1/21/2023 as a transfer from [facility] for evaluation of loculated pleural effusion. He has extensive PMH including h/o liver transplant 2016 due to alcohol abuse, pAF on chronic anticoagulation, history of diabetes mellitus type 2, CVA, hypertension, CHRISTIAN on BiPAP.  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 He was recovering in [facility] and was transferred to [facility] for consideration of chest tube placement and possible intrapleural lysis for worsening effusion.     Acute metabolic encephalopathy with delirium, multifactorial  Hepatic encephalopathy  Chronic liver disease, history of liver transplant 2016  End-stage renal disease (ESRD), on hemodialysis (HD)  History of seizure, on Keppra and Vimpat  Waxing and waning mentation, coinciding with lactulose being held at [facility]  Earlier in hospital stay, remained confused and had pulled out tracheostomy tube, PICC line and pulled his dialysis catheter.  Lines replaced.  Palliative care consult 1/26, see note.  * Psychiatry consulted, recent follow-up 2/21, 2/28, see notes.    Overall, doing well with complicated, prolonged hospital course with concerns of hepatic encephalopathy, end-stage renal disease on dialysis, past PEA arrest, seizure disorder, and chronic liver disease with prior liver transplant.  Mental status continues to fluctuate requiring one-to-one sitter as needed for added safety and supervision.  Continue to reorient as needed.  Maintain normal day/night cycles and sleep-wake cycles.  Minimize sedating medications as able.  Remains weak and  deconditioned.    Continue Lactulose to 10 mg BID, monitor for symptoms to have 2-3 bowel movements per day    Ongoing hemodialysis, as per nephrology, 3X/week dialysis schedule; ongoing nephrology consult follow-up appreciated    Continue ramelteon 8 mg at bedtime, olanzapine (Zyprexa) 5 mg bid and as needed dosing    Reconsult psychiatry as needed     Hypomagnesemia    Monitor, replacement as per nephrology, on dialysis     Bilateral pleural effusions   Acute respiratory failure requiring tracheostomy  - resolved    Pneumonia  - resolved   ARDS  - resolved    Right pneumothorax - resolved   *Initial event was parainfluenza and strep pneumo pneumonia back in November 2022. Treated for ARDS. Had failed extubation x2 and tracheostomy performed on previous hospitalization. *Had additional complications of bilateral pneumothoraces as well as hydropneumothorax- pleural fluid grew candida parapsilosis  *Completed 4-week antifungal treatment. [facility] was successfully weaned from the ventilator.  *Previously there were concerns for worsening effusion while at [facility] and had thoracentesis 01/13 which returned exudative without growth on cultures. CT chest/abdomen/pelvis on 01/18 demonstrated worsening effusions and concerns for infected parapneumonic effusions with possible SBP.  Multiple pulmonologist at [facility] reviewed CT scan recommended transfer to [facility] for consideration of chest tube placement and possible intrapleural lysis  *Thoracic surgery (Dr. Jackson) consulted during admission   *CT chest 1/22, small effusions on imaging and so chest tube was not inserted.   ID consulted, see note 2/10.  *Patient pulled out his tracheostomy tube on the night 2/1-2/2 and is tolerating well without increased shortness of breath persistent cough or worsening hypoxia  * Chest x-ray 2/3 with cardiomegaly, chronic small bilateral pleural effusions, no pulmonary edema; right internal jugular dialysis catheter in place  *  3/7/23 CT Chest:  1.  Slight improvement in right pleural effusion and pleural  thickening since the comparison study.  2.  Similar bilateral infiltrates and minimal left effusion.  3.  New wedge-shaped area of decreased attenuation in the spleen,  question a splenic infarct.   * 3/12 Pulmonary consult, see note, concerns of hypercapnia, atelectasis, with consideration of BiPAP trial; no recommendations for antibiotics with infiltrates felt to be atelectasis    Monitor respiratory status, recently stable on room air; occasional cough reported    Encourage incentive spirometry as able    Wound care previously consulted for tracheostomy site care, monitor for signs and symptoms of infection, stable    Splenic infarct  *3/9 CT abdomen:   1. Splenomegaly with a small probable infarct superiorly, infarct is  new since the most recent available abdomen comparison.   2. Small to moderate ascites.  *Hematology consult 3/8 for splenomegaly and question of splenic infarct, Dr. Smith.  Splenomegaly felt secondary to liver disease with suspicion of splenic infarct low given absence of abdominal pain.  Ongoing anticoagulation continued, Eliquis.  See consult note for details.    Monitor, hematology follow-up as needed     S/p liver transplant 2016   Chronic immunosuppression, on tacrolimus  Cirrhosis with ascites, intermittent paracenteses as needed  Subacute bacterial peritonitis (SBP), resolved  *Main manifestations of this are hepatic encephalopathy and ascites.  Hepatologist at [facility] felt that most likely reason for the cirrhosis was metabolic syndrome or alcohol intake.  There was no evidence of rejection on biopsy and there was some evidence of regeneration. Paracentesis done on 12/22/2022 showed lactobacillus indicating SBP, treated with Unasyn and Augmentin, finished 2-week course 1/12/2023.  Requires large-volume paracentesis periodically for recurring ascites.    *Paracentesis 1/13/2023 yielded about 7500  "cc. Cultures NGTD. paracentesis on 1/24 with 4.8 L fluid removal  Paracentesis on 3/6/23 No SBP    Stable, continue intermittent paracentesis as needed if develops symptomatic ascites    Monitor for signs and symptoms of recurrent spontaneous bacterial peritonitis     Further treatment for recurrent ascites with TIPS deferred to his Liver Transplant team and/or Hepatology, he has an appointment on 4/21/2023 with Hepatology, Dr. Simms    Continue Tacrolimus 1 mg BID    Continue rifaximin 550 mg BID    Continue lactulose as ordered, titrate    GI consult as needed in hospital for new acute issues, otherwise as outpatient    Encourgage protein diet      Goals of care   As per prior rounding provider, \"on 1/25 nursing had mentioned that patient had refused to undergo dialysis and want to stop care, palliative care was consulted.  Patient and his spouse denied wanting to stop care and wanted ongoing restorative care including full code\"    Monitor, Full Code status    Needs placement to transitional care unit (TCU) on discharge w/ dialysis     Diarrhea, improved, on lactulose for chronic liver disease  *C difficile negative on 1/31, likely secondary to lactulose.  *Discontinued rectal tube (2/12) as stools more formed    Continue lactulose with goal of 2 to 3 soft bowel movement in 24 hours     Paroxysmal atrial fibrillation  Chronic anticoagulation, apixaban  Recently has remained in sinus rhythm, on anticoagulation with apixaban indefinitely for stroke prophylaxis.      Monitor rhythm    Continue apixaban/Eliquis anticoagulation, monitor for signs/symptoms of bleeding    Monitor hemoglobin, see below     Acute anemia  Pt has required intermittent transfusions for on-going anemia.  Anemia most likely secondary to critical illness/chronic disease, chronic renal disease.  Baseline hemoglobin around 7-8  Likely Epo resistance    Transfuse packed red blood cells to keep hemoglobin > 7    Epoetin lfa-epbx use as per " nephrology    Hemoglobin 8.7 on 3/15, 8.6 3/14, 7.9 3/12, 9.6 on 3/11     Hypercalcemia  Likley hypercalcemia of immobility, previously discussed with nephrology on 3/8/23.  Future use of vitamin D as well as PhosLo as per nephrology.    Calcium 10.3 on 3/13/2023 and 11.1 on 3/14/2023     History PEA arrest   PEA arrest prior to his previous admission and 1 episode of PEA associated with desaturation on 12/20.  No structural cardiac disease.     Stable, continue cardiac monitoring     Chronic hypotension  In the past has developed baseline hypotension with cirrhosis and prolonged critical illness.  On hemodialysis.  Has received midodrine and albumin during dialysis, as per nephrology    Monitor blood pressure, nephrology to also review/monitor      History of seizure   After hypoxic event, in the context of baseline multifactorial encephalopathy secondary to his ongoing critical illness and prior cardiac arrests and prior strokes and cirrhosis with hepatic encephalopathy. MRI of head of 12/20/22 reveals no PRES or leptomeningeal enhancement but scattered.  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   No recent seizure activity.    Continue levetiracetam, lacosamide     Seizure precautions    Outpatient follow-up with neurology, reconsult inpatient if needed     End-stage renal disease (ESRD) on dialysis  Anemia due to renal disease  Hypotension during dialysis, history of chronic hypotension  *Underwent dialysis  on 2/22/23 and was hypotensive and received midodrine and albumin    as per nephrology     Diabetes mellitus type 2  *Hemoglobin A1c on November 2022 was 4.7  *By report, on Lantus insulin in the past.  Blood sugar checks and sliding scale insulin previously discontinued as has been stable without insulin needs    Monitor with periodic blood sugar checks as needed     Severe malnutrition, protein and calorie type  Moderate dysphagia  G-tube  feedings  *Video swallow on 2/23, see report    Tube feeds via PEG tube as ordered, reassess daily/weekly, Nutrition consulted    IR replaced the PEG tube on 2/8/2023, monitor    Nutritionist consulted and following for tube feeds; higher protein diet    SLP following as well. Oral diet as per speech and language therapy (SLP)     Continue PPI, Protonix     Anxiety  Fluoxetine was discontinued as per psychiatry recommendation on 2/2 (see consult note for details)     Stable, monitor; comfort and reassurance offered during visits    Psychiatry follow-up as needed, help appreciated    Weakness and deconditioning    Ongoing physical therapy (PT), occupational therapy (OT)    Transitional care unit (TCU) on future hospital discharge    Disposition     Estimated length of stay 4 to 5 days    Anticipated discharge to transitional care unit (TCU), skilled care with dialysis services; social work consulted    Patient's wife, Ofe, called and updated with care plan 3/14       Diet: Room Service  Snacks/Supplements Adult: Other; Gelatein+ with brkfst and lunch, and magic cup with dinner (RD); With Meals  Adult Formula Bolus Feeding: Novasource Renal; Route: Gastrostomy; 3 times daily; Volume per Bolus: 240; mL(s); Continue to encourage after meal bolus at 80 mL/hr x 3 hrs, If patient consumes <50% of that meal  Combination Diet Regular Diet; Mildly Thick (level 2) (OK for fresh fruit (e.g., pineapple) per SLP)    DVT Prophylaxis: DOAC  Nixon Catheter: Not present  Lines: PRESENT      CVC Double Lumen Right External jugular Tunneled-Site Assessment: WDL      Cardiac Monitoring: None  Code Status: Full Code      Clinically Significant Risk Factors           # Hypercalcemia: Highest Ca = 11.1 mg/dL in last 2 days, will monitor as appropriate  # Hypomagnesemia: Lowest Mg = 1.6 mg/dL in last 2 days, will replace as needed   # Hypoalbuminemia: Lowest albumin = 1.9 g/dL at 1/23/2023  6:38 AM, will monitor as appropriate   #  Thrombocytopenia: Lowest platelets = 128 in last 2 days, will monitor for bleeding          # Severe Malnutrition: based on nutrition assessment        Disposition Plan      Expected Discharge Date: 03/21/2023, 12:00 PM  Discharge Delays: Placement - LTC  Destination: inpatient rehabilitation facility  Discharge Comments: Dialysis pt MWF. Will need placement TCU. [facility] willing to accept once no sitter, SW to follow up when sitter free          Renny Erwin MD  Hospitalist Service  [facility]  Securely message with [employer] (more info)  Text page via [employer] Paging/Directory   ______________________________________________________________________    Interval History   Lying in bed, in dialysis, pleasant and cooperative, more alert today.  No report of pain.  Intermittent cough reported.  No increased shortness of breath.  Talkative and interactive, appearing comfortable.    Physical Exam   Vital Signs: Temp: 97.7  F (36.5  C) Temp src: Oral BP: 93/60 Pulse: 90   Resp: (!) 35 SpO2: 96 % O2 Device: None (Room air)    Weight: 175 lbs .72 oz    Awake alert, lying in bed, no new complaints  Lungs clear with diminished breath sounds in the bases, no wheezes or crackles  S1-S2, regular rhythm without rubs or gallops  Abdomen soft, nontender without rebound or rigidity  Extremities without edema  Mental status awake, alert, calm and cooperative    Medical Decision Making             Data     I have personally reviewed the following data over the past 24 hrs:    4.3  \   8.7 (L)   / 137 (L)     N/A N/A N/A /  N/A   N/A N/A N/A \       Imaging results reviewed over the past 24 hrs:   No results found for this or any previous visit (from the past 24 hour(s)).   Recent Labs   Lab 03/15/23  0935 03/14/23  1033 03/13/23  1454 03/12/23  0756 03/11/23  0846   HGB 8.7* 8.6* 8.6* 7.9* 9.6*

## 2023-03-15 NOTE — PROGRESS NOTES
Potassium   Date Value Ref Range Status   03/14/2023 4.8 3.4 - 5.3 mmol/L Final   12/29/2022 3.4 3.4 - 5.3 mmol/L Final   04/20/2020 3.9 3.4 - 5.3 mmol/L Final     Potassium POCT   Date Value Ref Range Status   01/19/2023 3.6 3.5 - 5.0 mmol/L Final     Hemoglobin   Date Value Ref Range Status   03/15/2023 8.7 (L) 13.3 - 17.7 g/dL Final   04/20/2020 14.3 13.3 - 17.7 g/dL Final     Creatinine   Date Value Ref Range Status   03/14/2023 3.96 (H) 0.67 - 1.17 mg/dL Final   04/20/2020 1.06 0.66 - 1.25 mg/dL Final     Urea Nitrogen   Date Value Ref Range Status   03/14/2023 56.4 (H) 6.0 - 20.0 mg/dL Final   12/29/2022 46 (H) 7 - 30 mg/dL Final   04/20/2020 23 7 - 30 mg/dL Final     Sodium   Date Value Ref Range Status   03/14/2023 138 136 - 145 mmol/L Final   04/20/2020 139 133 - 144 mmol/L Final     INR   Date Value Ref Range Status   12/21/2022 1.36 (H) 0.85 - 1.15 Final   10/07/2019 1.20 (H) 0.86 - 1.14 Final       DIALYSIS PROCEDURE NOTE  Hepatitis status of previous patient on machine log was checked and verified ok to use with this patients hepatitis status.  Patient dialyzed for 3 hrs. on a K2 bath with a net fluid removal of  1.5L.  A BFR of 350 ml/min was obtained via a CVC.      The treatment plan was discussed with Dr. Montaño during the treatment.    Total heparin received during the treatment: 0 units.   Line flushed, clamped and capped with heparin 1:1000 1.9 mL (1900 units) per lumen    Meds  given: epogen   Complications: none      Person educated: pre-tx. Knowledge base minimal2. Barriers to learning: confusion. Educated on procedure via verbal mode. Patient/family verbalized understanding. Pt prefers verbal education style.     ICEBOAT? Timeout performed pre-treatment  I: Patient was identified using 2 identifiers  C:  Consent Signed Yes  E: Equipment preventative maintenance is current and dialysis delivery system OK to use  B: Hepatitis B Surface Antigen: negative; Draw Date: 3/1/23      Hepatitis B  Surface Antibody: susceptible; Draw Date: 3/9/23  O: Dialysis orders present and complete prior to treatment  A: Vascular access verified and assessed prior to treatment  T: Treatment was performed at a clinically appropriate time  ?: Patient was allowed to ask questions and address concerns prior to treatment  See Adult Hemodialysis flowsheet in EPIC for further details and post assessment.  Machine water alarm in place and functioning. Transducer pods intact and checked every 15min.   Pt returned via bed.  Chlorine/Chloramine water system checked every 4 hours.  Outpatient Dialysis at *not established    Patient repositioned every 2 hours during the treatment.  Post treatment report given to Niecy Bowen RN regarding 1.5L of fluid removed, last BP of 96/64, and patient pain rating of 9/10.

## 2023-03-15 NOTE — PLAN OF CARE
Goal Outcome Evaluation:       Cognitive Concerns/ Orientation : Confused, alert to self, orientation varies throughout shift.    BEHAVIOR & AGGRESSION TOOL COLOR: Green, calm and cooperative. Restless at times but redirectable.    ABNL VS/O2: VSS on Room air   MOBILITY: Up with assist of two and mark steady.  Turn and reposition every 2 hours while in bed.   PAIN MANAGMENT: Denies   DIET: Regular diet with mildly thickened liquids. Takes pills whole with thickened liquids.   BOWEL/BLADDER: Dialysis patient, no urine output.  No BM this shift.   ABNL LAB/BG: Cr 3.96; Hg 8.6; Platelets 128  DRAIN/DEVICES: R chest CVC/double lumen for hemodialysis; R PIV saline locked; PEG tube/clamped  SKIN: Scattered bruising and scabs; skin tear L neck covered with mepilex; CDI  TESTS/PROCEDURES: n/a  D/C DATE: Pending placement, SW is following  Discharge Barriers:   OTHER IMPORTANT INFO:  Dialysis MWF.

## 2023-03-15 NOTE — PLAN OF CARE
Prolonged hospital course with concerns of hepatic encephalopathy, end-stage renal disease on dialysis, past PEA arrest, seizure disorder, and chronic liver disease with prior liver transplant.  DATE & TIME: 03- AM shift                        Cognitive Concerns/ Orientation : A/Ox1-2; orientation fluctuates. Disoriented to time/place.   BEHAVIOR & AGGRESSION TOOL COLOR: Green, calm and cooperative. Restless at times but redirectable. Family is visiting, very helpful with redirecting pt.     ABNL VS/O2: VSS on RA. Soft BP today, pt is on midodrine. Unlabored breathing. Infrequent cough noted.   MOBILITY: Up with assist of two and mark steady. Up to the chair for meals. T/R q2hr when in bed.   PAIN MANAGMENT: c/o lower back pain, lidocaine patch applied. Tylenol x 1 given.   DIET: Regular diet with mildly thickened liquids. Encourage TF if pt eats less than 50% of the meal. Ate most of his breakfast and lunch today.Takes pills whole with thickened liquids.   BOWEL/BLADDER: continent of bowel this shift, BM x 2. Pt is on lactulose BID, goal 3 BM per day. Dialysis pt, no urine output this shift.   ABNL LAB/BG: Hg 8.7. Platelets 137  DRAIN/DEVICES: R chest CVC/double lumen for hemodialysis; R PIV saline locked; PEG tube/clamped  SKIN: Scattered bruising and scabs; skin tear L neck covered with mepilex; L elbow scab, covered with mepilex  TESTS/PROCEDURES: completed dialysis today, 1.5L was taken off  D/C DATE: Pending placement, SW is following. Per PT, TCU recommended.   Discharge Barriers: restlessness, sitter  OTHER IMPORTANT INFO: Family is visiting. Dialysis MWF.

## 2023-03-15 NOTE — PLAN OF CARE
Goal Outcome Evaluation:    Prolonged hospital course with concerns of hepatic encephalopathy, end-stage renal disease on dialysis, past PEA arrest, seizure disorder, and chronic liver disease with prior liver transplant.  DATE & TIME: 03- [id number] shift    Cognitive Concerns/ Orientation : A/Ox1-2; orientation fluctuates. Disoriented to time/place. Requires attendant for safety-pt restless and pulls at lines at times.   BEHAVIOR & AGGRESSION TOOL COLOR: Green, calm and cooperative. Restless at times but redirectable.    ABNL VS/O2: VSS on RA. Unlabored breathing. Infrequent cough noted.   MOBILITY: Up with assist of two and mark steady. Up to the chair for meals. T/R q2hr when in bed.   PAIN MANAGMENT: c/o lower back pain, tylenol given-effective.    DIET: Regular diet with mildly thickened liquids. Encourage TF if pt eats less than 50% of the meal. Ate 75% of his dinner. Takes pills whole with thickened liquids.   BOWEL/BLADDER: continent of bowel during day shift, Lactulose held this evening as pt reached his BM goal for today (had 3 bms). Dialysis pt, no urine output this shift.   ABNL LAB/BG: Cr 3.96; Hg 8.6; Platelets 128  DRAIN/DEVICES: R chest CVC/double lumen for hemodialysis; R PIV saline locked; PEG tube/clamped  SKIN: Scattered bruising and scabs; skin tear L neck covered with mepilex; CDI  TESTS/PROCEDURES: n/a  D/C DATE: Pending placement, SW is following  Discharge Barriers:   OTHER IMPORTANT INFO:  Dialysis MWF. Lidocaine patch removed at 2100

## 2023-03-16 ENCOUNTER — APPOINTMENT (OUTPATIENT)
Dept: PHYSICAL THERAPY | Facility: CLINIC | Age: 58
DRG: 981 | End: 2023-03-16
Attending: STUDENT IN AN ORGANIZED HEALTH CARE EDUCATION/TRAINING PROGRAM
Payer: COMMERCIAL

## 2023-03-16 ENCOUNTER — APPOINTMENT (OUTPATIENT)
Dept: SPEECH THERAPY | Facility: CLINIC | Age: 58
DRG: 981 | End: 2023-03-16
Attending: STUDENT IN AN ORGANIZED HEALTH CARE EDUCATION/TRAINING PROGRAM
Payer: COMMERCIAL

## 2023-03-16 ENCOUNTER — APPOINTMENT (OUTPATIENT)
Dept: ULTRASOUND IMAGING | Facility: CLINIC | Age: 58
DRG: 981 | End: 2023-03-16
Attending: INTERNAL MEDICINE
Payer: COMMERCIAL

## 2023-03-16 PROCEDURE — 250N000013 HC RX MED GY IP 250 OP 250 PS 637

## 2023-03-16 PROCEDURE — 120N000001 HC R&B MED SURG/OB

## 2023-03-16 PROCEDURE — 250N000013 HC RX MED GY IP 250 OP 250 PS 637: Performed by: INTERNAL MEDICINE

## 2023-03-16 PROCEDURE — 97110 THERAPEUTIC EXERCISES: CPT | Mod: GP

## 2023-03-16 PROCEDURE — 250N000013 HC RX MED GY IP 250 OP 250 PS 637: Performed by: STUDENT IN AN ORGANIZED HEALTH CARE EDUCATION/TRAINING PROGRAM

## 2023-03-16 PROCEDURE — 93970 EXTREMITY STUDY: CPT

## 2023-03-16 PROCEDURE — 97116 GAIT TRAINING THERAPY: CPT | Mod: GP

## 2023-03-16 PROCEDURE — 92526 ORAL FUNCTION THERAPY: CPT | Mod: GN

## 2023-03-16 PROCEDURE — 250N000013 HC RX MED GY IP 250 OP 250 PS 637: Performed by: HOSPITALIST

## 2023-03-16 PROCEDURE — 99232 SBSQ HOSP IP/OBS MODERATE 35: CPT | Performed by: INTERNAL MEDICINE

## 2023-03-16 PROCEDURE — 250N000012 HC RX MED GY IP 250 OP 636 PS 637: Performed by: STUDENT IN AN ORGANIZED HEALTH CARE EDUCATION/TRAINING PROGRAM

## 2023-03-16 PROCEDURE — 99232 SBSQ HOSP IP/OBS MODERATE 35: CPT | Performed by: HOSPITALIST

## 2023-03-16 PROCEDURE — 97530 THERAPEUTIC ACTIVITIES: CPT | Mod: GP

## 2023-03-16 RX ADMIN — RIFAXIMIN 550 MG: 550 TABLET ORAL at 20:53

## 2023-03-16 RX ADMIN — FOLIC ACID 1 MG: 1 TABLET ORAL at 08:28

## 2023-03-16 RX ADMIN — GUAIFENESIN 600 MG: 600 TABLET, EXTENDED RELEASE ORAL at 20:52

## 2023-03-16 RX ADMIN — MIDODRINE HYDROCHLORIDE 10 MG: 5 TABLET ORAL at 12:58

## 2023-03-16 RX ADMIN — ACETAMINOPHEN 650 MG: 325 TABLET, FILM COATED ORAL at 15:00

## 2023-03-16 RX ADMIN — RAMELTEON 8 MG: 8 TABLET, FILM COATED ORAL at 20:52

## 2023-03-16 RX ADMIN — GUAIFENESIN 600 MG: 600 TABLET, EXTENDED RELEASE ORAL at 08:28

## 2023-03-16 RX ADMIN — OLANZAPINE 5 MG: 5 TABLET, ORALLY DISINTEGRATING ORAL at 20:53

## 2023-03-16 RX ADMIN — LACTULOSE 10 G: 10 SOLUTION ORAL at 08:27

## 2023-03-16 RX ADMIN — WHITE PETROLATUM: 1.75 OINTMENT TOPICAL at 08:30

## 2023-03-16 RX ADMIN — PANTOPRAZOLE SODIUM 40 MG: 40 TABLET, DELAYED RELEASE ORAL at 08:28

## 2023-03-16 RX ADMIN — RIFAXIMIN 550 MG: 550 TABLET ORAL at 08:27

## 2023-03-16 RX ADMIN — APIXABAN 5 MG: 5 TABLET, FILM COATED ORAL at 20:52

## 2023-03-16 RX ADMIN — OLANZAPINE 5 MG: 5 TABLET, ORALLY DISINTEGRATING ORAL at 08:28

## 2023-03-16 RX ADMIN — Medication 1 CAPSULE: at 08:28

## 2023-03-16 RX ADMIN — LEVETIRACETAM 1000 MG: 500 TABLET, FILM COATED ORAL at 00:40

## 2023-03-16 RX ADMIN — LACOSAMIDE 100 MG: 100 TABLET, FILM COATED ORAL at 12:58

## 2023-03-16 RX ADMIN — ACETAMINOPHEN 650 MG: 325 TABLET, FILM COATED ORAL at 08:28

## 2023-03-16 RX ADMIN — MIDODRINE HYDROCHLORIDE 10 MG: 5 TABLET ORAL at 17:56

## 2023-03-16 RX ADMIN — TACROLIMUS 1 MG: 1 CAPSULE ORAL at 08:28

## 2023-03-16 RX ADMIN — TACROLIMUS 1 MG: 1 CAPSULE ORAL at 20:53

## 2023-03-16 RX ADMIN — LACOSAMIDE 100 MG: 100 TABLET, FILM COATED ORAL at 00:41

## 2023-03-16 RX ADMIN — MIDODRINE HYDROCHLORIDE 10 MG: 5 TABLET ORAL at 08:27

## 2023-03-16 RX ADMIN — APIXABAN 5 MG: 5 TABLET, FILM COATED ORAL at 08:28

## 2023-03-16 ASSESSMENT — ACTIVITIES OF DAILY LIVING (ADL)
ADLS_ACUITY_SCORE: 61
ADLS_ACUITY_SCORE: 59
ADLS_ACUITY_SCORE: 55
ADLS_ACUITY_SCORE: 61
ADLS_ACUITY_SCORE: 61
ADLS_ACUITY_SCORE: 65
ADLS_ACUITY_SCORE: 55
ADLS_ACUITY_SCORE: 61
ADLS_ACUITY_SCORE: 65
ADLS_ACUITY_SCORE: 61
ADLS_ACUITY_SCORE: 65
ADLS_ACUITY_SCORE: 61

## 2023-03-16 NOTE — PROGRESS NOTES
[facility]    Medicine Progress Note - Hospitalist Service    Date of Admission:  1/21/2023    Assessment & Plan   Blanco Osborne is a 57 year-old male admitted on 1/21/2023 as a transfer from [facility] for evaluation of loculated pleural effusion. He has extensive PMH including h/o liver transplant 2016 due to alcohol abuse, pAF on chronic anticoagulation, history of diabetes mellitus type 2, CVA, hypertension, CHRISTIAN on BiPAP.  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 He was recovering in [facility] and was transferred to [facility] for consideration of chest tube placement and possible intrapleural lysis for worsening effusion.     Acute metabolic encephalopathy with delirium, multifactorial  Hepatic encephalopathy  Chronic liver disease, history of liver transplant 2016  End-stage renal disease (ESRD), on hemodialysis (HD)  History of seizure, on Keppra and Vimpat  Waxing and waning mentation, coinciding with lactulose being held at [facility]  Earlier in hospital stay, remained confused and had pulled out tracheostomy tube, PICC line and pulled his dialysis catheter.  Lines replaced.  Palliative care consult 1/26, see note.  * Psychiatry consulted, recent follow-up 2/21, 2/28, see notes.    Overall, doing well with complicated, prolonged hospital course with concerns of hepatic encephalopathy, end-stage renal disease on dialysis, past PEA arrest, seizure disorder, and chronic liver disease with prior liver transplant.  Mental status continues to fluctuate requiring one-to-one sitter as needed for added safety and supervision.  Continue to reorient as needed.  Maintain normal day/night cycles and sleep-wake cycles.  Minimize sedating medications as able.  Remains weak and  deconditioned.    Continue Lactulose to 10 mg BID, monitor for symptoms to have 2-3 bowel movements per day    Ongoing hemodialysis, as per nephrology, 3X/week dialysis schedule; ongoing nephrology consult follow-up appreciated    Continue ramelteon 8 mg at bedtime, olanzapine (Zyprexa) 5 mg bid and as needed dosing    Reconsult psychiatry as needed     Hypomagnesemia    Monitor, replacement as per nephrology, on dialysis     Bilateral pleural effusions   Acute respiratory failure requiring tracheostomy  - resolved    Pneumonia  - resolved   ARDS  - resolved    Right pneumothorax - resolved   *Initial event was parainfluenza and strep pneumo pneumonia back in November 2022. Treated for ARDS. Had failed extubation x2 and tracheostomy performed on previous hospitalization. *Had additional complications of bilateral pneumothoraces as well as hydropneumothorax- pleural fluid grew candida parapsilosis  *Completed 4-week antifungal treatment[facility].  *Previously there were concerns for worsening effusion while at [facility] and had thoracentesis 01/13 which returned exudative without growth on cultures. CT chest/abdomen/pelvis on 01/18 demonstrated worsening effusions and concerns for infected parapneumonic effusions with possible SBP.  Multiple pulmonologist at [facility] reviewed CT scan recommended transfer to [facility] for consideration of chest tube placement and possible intrapleural lysis  *Thoracic surgery (Dr. Jackson) consulted during admission   *CT chest 1/22, small effusions on imaging and so chest tube was not inserted.   ID consulted, see note 2/10.  *Patient pulled out his tracheostomy tube on the night 2/1-2/2 and is tolerating well without increased shortness of breath persistent cough or worsening hypoxia  * Chest x-ray 2/3 with cardiomegaly, chronic small bilateral pleural effusions, no pulmonary edema; right internal jugular dialysis catheter in place  *  3/7/23 CT Chest:  1.  Slight improvement in right pleural effusion and pleural  thickening since the comparison study.  2.  Similar bilateral infiltrates and minimal left effusion.  3.  New wedge-shaped area of decreased attenuation in the spleen,  question a splenic infarct.   * 3/12 Pulmonary consult, see note, concerns of hypercapnia, atelectasis, with consideration of BiPAP trial; no recommendations for antibiotics with infiltrates felt to be atelectasis    Monitor respiratory status, recently stable on room air; occasional cough reported    Encourage incentive spirometry as able    Wound care previously consulted for tracheostomy site care, monitor for signs and symptoms of infection, stable    Splenic infarct  *3/9 CT abdomen:   1. Splenomegaly with a small probable infarct superiorly, infarct is  new since the most recent available abdomen comparison.   2. Small to moderate ascites.  *Hematology consult 3/8 for splenomegaly and question of splenic infarct, Dr. Smith.  Splenomegaly felt secondary to liver disease with suspicion of splenic infarct low given absence of abdominal pain.  Ongoing anticoagulation continued, Eliquis.  See consult note for details.    Monitor, hematology follow-up as needed     S/p liver transplant 2016   Chronic immunosuppression, on tacrolimus  Cirrhosis with ascites, intermittent paracenteses as needed  Subacute bacterial peritonitis (SBP), resolved  *Main manifestations of this are hepatic encephalopathy and ascites.  Hepatologist at [facility] felt that most likely reason for the cirrhosis was metabolic syndrome or alcohol intake.  There was no evidence of rejection on biopsy and there was some evidence of regeneration. Paracentesis done on 12/22/2022 showed lactobacillus indicating SBP, treated with Unasyn and Augmentin, finished 2-week course 1/12/2023.  Requires large-volume paracentesis periodically for recurring ascites.    *Paracentesis 1/13/2023 yielded about 7500  "cc. Cultures NGTD. paracentesis on 1/24 with 4.8 L fluid removal  Paracentesis on 3/6/23 No SBP    Overall stable, past history of ascites with intermittent paracentesis, monitor and reassess daily.  Past history of SBP without abdominal pain or fever.  Monitor.    Further treatment for recurrent ascites with TIPS deferred to his Liver Transplant team and/or Hepatology; has an appointment on 4/21/2023 with Hepatology, Dr. Simms    Continue Tacrolimus 1 mg BID    Continue rifaximin 550 mg BID    Continue lactulose as ordered, titrate    GI consult as needed in hospital for new acute issues, otherwise as outpatient    Encourgage protein diet     Nutrition consult follow-up 3/16, see note, help appreciated     Goals of care   As per prior rounding provider, \"on 1/25 nursing had mentioned that patient had refused to undergo dialysis and want to stop care, palliative care was consulted.  Patient and his spouse denied wanting to stop care and wanted ongoing restorative care including full code\"    Monitor, Full Code status    Needs placement to transitional care unit (TCU) on discharge w/ dialysis     Diarrhea, improved, on lactulose for chronic liver disease  *C difficile negative on 1/31, likely secondary to lactulose.  *Discontinued rectal tube (2/12) as stools more formed    Continue lactulose with goal of 2 to 3 soft bowel movement in 24 hours     Paroxysmal atrial fibrillation  Chronic anticoagulation, apixaban  Recently has remained in sinus rhythm, on anticoagulation with apixaban indefinitely for stroke prophylaxis.      Monitor rhythm    Continue apixaban/Eliquis anticoagulation, monitor for signs/symptoms of bleeding    Monitor hemoglobin, see below     Acute anemia  Pt has required intermittent transfusions for on-going anemia.  Anemia most likely secondary to critical illness/chronic disease, chronic renal disease.  Baseline hemoglobin around 7-8  Likely Epo resistance    Transfuse packed red blood cells to " keep hemoglobin > 7    Epoetin lfa-epbx use as per nephrology    Hemoglobin 8.7 on 3/15, 8.6 3/14, 7.9 3/12, 9.6 on 3/11     Hypercalcemia  Likley hypercalcemia of immobility, previously discussed with nephrology on 3/8/23.  Future use of vitamin D as well as PhosLo as per nephrology.    Calcium 10.3 on 3/13/2023 and 11.1 on 3/14/2023    Hypercalcemia felt to reflect very high bone turnover per nephrology, hypercalcemia with immobilization; nephrology considering antiresorptive therapy if hypercalcemia becomes more severe     History PEA arrest   PEA arrest prior to his previous admission and 1 episode of PEA associated with desaturation on 12/20.  No structural cardiac disease.     Stable, continue cardiac monitoring     Chronic hypotension  In the past has developed baseline hypotension with cirrhosis and prolonged critical illness.  On hemodialysis.  Has received midodrine and albumin during dialysis, as per nephrology    Monitor blood pressure, nephrology to also review/monitor      History of seizure   After hypoxic event, in the context of baseline multifactorial encephalopathy secondary to his ongoing critical illness and prior cardiac arrests and prior strokes and cirrhosis with hepatic encephalopathy. MRI of head of 12/20/22 reveals no PRES or leptomeningeal enhancement but scattered.  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   No recent seizure activity.    Continue levetiracetam, lacosamide     Seizure precautions    Outpatient follow-up with neurology, reconsult inpatient if needed     End-stage renal disease (ESRD) on dialysis  Anemia due to renal disease  Hypotension during dialysis, history of chronic hypotension  *Underwent dialysis  on 2/22/23 and was hypotensive and received midodrine and albumin    as per nephrology    Nephrology reviewing vein mapping to assess options for internal access placement for dialysis, see note 3/16     Diabetes  mellitus type 2  *Hemoglobin A1c on November 2022 was 4.7  *By report, on Lantus insulin in the past.  Blood sugar checks and sliding scale insulin previously discontinued as has been stable without insulin needs    Monitor with periodic blood sugar checks as needed     Severe malnutrition, protein and calorie type  Moderate dysphagia  G-tube feedings  *Video swallow on 2/23, see report    Tube feeds via PEG tube as ordered, reassess daily/weekly, Nutrition consulted    IR replaced the PEG tube on 2/8/2023, monitor    Nutritionist consulted and following for tube feeds; higher protein diet    SLP following as well. Oral diet as per speech and language therapy (SLP)     Continue PPI, Protonix     Anxiety  Fluoxetine was discontinued as per psychiatry recommendation on 2/2 (see consult note for details)     Stable, monitor; comfort and reassurance offered daily    Psychiatry follow-up as needed, help appreciated    Weakness and deconditioning    Ongoing physical therapy (PT), occupational therapy (OT); remains weak and deconditioned, hope and encouragement offered    Transitional care unit (TCU) on future hospital discharge    Disposition     Estimated length of stay 4 to 5 days, indeterminant    Anticipated discharge to transitional care unit (TCU), skilled care with dialysis services       Diet: Room Service  Snacks/Supplements Adult: Other; Gelatein+ with brkfst and lunch, and magic cup with dinner (RD); With Meals  Adult Formula Bolus Feeding: Novasource Renal; Route: Gastrostomy; 3 times daily; Volume per Bolus: 240; mL(s); Continue to encourage after meal bolus at 80 mL/hr x 3 hrs, If patient consumes <50% of that meal  Combination Diet Regular Diet; Mildly Thick (level 2) (OK for fresh fruit (e.g., pineapple) per SLP)    DVT Prophylaxis: DOAC  Nixon Catheter: Not present  Lines: PRESENT      CVC Double Lumen Right External jugular Tunneled-Site Assessment: WDL      Cardiac Monitoring: None  Code Status: Full  Code      Clinically Significant Risk Factors              # Hypoalbuminemia: Lowest albumin = 1.9 g/dL at 1/23/2023  6:38 AM, will monitor as appropriate            # Severe Malnutrition: based on nutrition assessment        Disposition Plan      Expected Discharge Date: 03/21/2023, 12:00 PM  Discharge Delays: Placement - LTC  Destination: inpatient rehabilitation facility  Discharge Comments: Dialysis pt MWF. Will need placement TCU. [facility] willing to accept once no sitter, SW to follow up when sitter free          Renny Erwin MD  Hospitalist Service  [facility]  Securely message with [employer] (more info)  Text page via [facility] Paging/Directory   ______________________________________________________________________    Interval History   Sitting up in his chair, pleasant and interactive, remembering my name.  No new complaints other than chronic low back pain.  Nursing staff at the bedside.    Physical Exam   Vital Signs: Temp: 97.8  F (36.6  C) Temp src: Oral BP: 108/66 Pulse: 82   Resp: 18 SpO2: 96 % O2 Device: None (Room air)    Weight: 175 lbs .72 oz    Alert and awake, appearing comfortable, sitting up in his chair  Lungs mild to moderate inspiratory effort without wheezes  Heart S1-S2 with regular rhythm, no rubs or gallops  Abdomen soft, nontender without rebound or rigidity, PEG tube intact  Extremities without significant edema  Mental status alert, answering simple questions and following commands appropriately, one-to-one staff at the bedside    Medical Decision Making             Data         Imaging results reviewed over the past 24 hrs:   No results found for this or any previous visit (from the past 24 hour(s)).   Recent Labs   Lab 03/15/23  0935 03/14/23  1033 03/13/23  1454 03/12/23  0756 03/11/23  0846   HGB 8.7* 8.6* 8.6* 7.9* 9.6*

## 2023-03-16 NOTE — PLAN OF CARE
"Goal Outcome Evaluation:       Prolonged hospital course with concerns of hepatic encephalopathy, end-stage renal disease on dialysis, past PEA arrest, seizure disorder, and chronic liver disease with prior liver transplant.  DATE & TIME: 03- 7804-0458 shift                        Cognitive Concerns/ Orientation : A/Ox1-2; orientation continue to fluctuate. Disoriented to time/place.   BEHAVIOR & AGGRESSION TOOL COLOR: Green, calm and cooperative. Restless/ increased confusion at times but redirectable.      ABNL VS/O2: VSS on RA. Midodrine not administered d/t medication parameters  MOBILITY: Up with assist of two and mark steady. Refused OOB. T/R q2hr when in bed.   PAIN MANAGMENT: c/o lower back pain, lidocaine patch in place most of shift. Tylenol x 1 administered  DIET: Regular diet with mildly thickened liquids. TF if pt eats less than 50% of the meal. 75% of supper consumed.Takes pills great with whole with thickened liquids.   BOWEL/BLADDER: continent of bowel this shift, 2 soft formed BM x 2. Pt is on lactulose BID, (goal 3 BM per day - *met). Dialysis pt - received dialysis today  ABNL LAB/BG: Hg 8.7. Platelets 137  DRAIN/DEVICES: R chest CVC/double lumen for hemodialysis; R PIV saline locked; PEG tube/clamped  SKIN: Scattered bruising/scabs; skin tear L neck - mepilex CDI; L elbow scab (mepilex in place)  TESTS/PROCEDURES: completed dialysis today, 1.5L was taken off  D/C DATE: Pending placement, SW is following. Per PT, TCU recommended.   Discharge Barriers: sitter - per SW \"Once patient is off sitter 24 hours sw will send referral to [facility]\"  OTHER IMPORTANT INFO: visiting. Dialysis MWF.                 "

## 2023-03-16 NOTE — PROGRESS NOTES
CLINICAL NUTRITION SERVICES - REASSESSMENT NOTE    Recommendations Ordered by Registered Dietitian (RD):   - continue supplements as ordered  - continue room service w/ assistance  - recommend back up bolus if intake is <50% of meal   Malnutrition:   (3/16)  % Weight Loss:  > 5% in 1 month (severe malnutrition)   % Intake:  No longer applies   Subcutaneous Fat Loss:  Orbital region moderate depletion, Upper arm region moderate-severe depletion and Thoracic region moderate-severe depletion  Muscle Loss:  Temporal region moderate depletion, Clavicle bone region severe depletion, Acromion bone region severe depletion, Patellar region moderate depletion and Anterior thigh region moderate-severe depletion  Fluid Retention:  Trace     Malnutrition Diagnosis: Severe malnutrition  In Context of:  Acute on Chronic illness or disease     EVALUATION OF PROGRESS TOWARD GOALS   Diet: regular diet, mildly thick liquids.   Snack: Gelatein plus w/ Breakfast/Lunch; Magic Cup w/ dinner. OK to order PRN.   Room service w/ assistance     Nutrition Support:   Enteral Regimen: Novasource Renal at 80 mL/hr x 3 hours PRN back up bolus for breakfast or lunch if consumes <50%.   Per Bolus Provisions: 240 mL formula = 480 kcal, 22 g protein, 44 g CHO, 0 g fiber and 172 mL free water  Free Water Flush: 100 mL before and after each feeding    Intake/Tolerance:   - Has not been receiving back up bolus for at least the past 2 weeks. Pt declines, or eats well enough.   - 50-75% of meals eaten the past week. He may miss a meal when in dialysis.   - Ordering adequate meals and additional supplements with help from .     3/15 - received 2430 kcal, 140 g protein   3/14 - received 3460 kcal, 173 g protein   3/13 - received 3340 kcal, 174 g protein   (If eating 50% at most, still meets 73% estimated energy needs & 80% pro needs)    - Labs: reviewed.   - Stooling; (pt on lactulose)  3/16 - x3 so far  3/15 - x4  3/14 - x3  3/13 -  x5  - Weight: 79.4 kg (last measured on 3/10) - 20# since admission, ~10%.   - Meds:   Folic Acid 1 mg  Lactulose - with titration to 2-3 soft BMs  Renal multivit    ASSESSED NUTRITION NEEDS:  Dosing Weight (2/27) 84.2 kg   Estimated Energy Needs: 4145-1765 kcals (25-30 Kcal/Kg)  Justification: dialysis  Estimated Protein Needs: 100-125 grams protein (1.2-1.5 g pro/Kg)  Justification: dialysis    NEW FINDINGS:   - HD MWF per Nephrology  - TCU at discharge once able.     Previous Goals:   Intake of >/=75% estimated needs in meals + supplements.   Evaluation: Met    Previous Nutrition Diagnosis:   No nutrition diagnosis identified at this time   Evaluation: No change    CURRENT NUTRITION DIAGNOSIS  No nutrition diagnosis identified at this time     INTERVENTIONS  Recommendations / Nutrition Prescription  - continue supplements as ordered  - continue room service w/ assistance  - recommend back up bolus if intake is <50% of meal    Implementation  No changes today.     Goals  Intake of >/=75% estimated needs in meals + supplements.     MONITORING AND EVALUATION:  Progress towards goals will be monitored and evaluated per protocol and Practice Guidelines    Edna Osuna RD, LD  [facility], [facility], [facility]  Pager: [phone]  Weekend Pager: [phone]

## 2023-03-16 NOTE — PLAN OF CARE
SUMMARY: Pleural effusion/ESRD/metabolic encephalopathy    DATE & TIME: 03/15/2023 Night       Cognitive Concerns/ Orientation : Alert to self, time--orientation waxes/wanes; less restless overnight; up in chair for last half of shift   BEHAVIOR & AGGRESSION TOOL COLOR: Green         ABNL VS/O2: VSS, room air  MOBILITY: Strong assist-2 Justyna Stedy/gait belt or lift; able to reposition self in bed  PAIN MANAGMENT: Denies  DIET: Regular + Mildly thickened liquids (level 2); if not eating > 50% of meal, then bolus through PEG  BOWEL/BLADDER: Incontinent of stool at times; pt on hemodialysis  ABNL LAB/BG:  BUN 61.5; Creat 4.44; Alb 2.5; Alk phos 200; ALT 8; WBC 3.5; Hgb 7.9; hematocrit 26.2; Platelets 116  DRAIN/DEVICES: R chest CVC, double lumen for hemodialysis; R PIV saline locked; PEG tube, clamped  SKIN: Scattered bruising and scabs; L elbow dried scab covered with mepilex; L neck tear covered with foam dressing  D/C DATE: Pending placement to TCU  OTHER IMPORTANT INFO: Hemodialysis MWF

## 2023-03-16 NOTE — PLAN OF CARE
Goal Outcome Evaluation:           Overall Patient Progress: no changeOverall Patient Progress: no change    Outcome Evaluation: Eating 50-75% of large meals. ordering adequately with help of . back up boluses still ordered, but have not been utilized for ~2 weeks.

## 2023-03-16 NOTE — PROGRESS NOTES
Renal Medicine Progress Note            Assessment/Plan:     Assessment:  1) ESRD on MWF chronic dialysis via right tunneled CVC    Blood pressure, hemodynamics/BP's appear good. Tolerated 1.5kg UF last two runs.      2) Hypercalcemia:   3/14 uncorrected calcium at 11.1. Albumin 2.8 so corrected calcium about 12. CTX very high at 3440 reflecting very high bone turnover state, fits with hypercalcemia with immobalization (and with appropriately suppressed PTH). Plan for anti-resorptive (denosumab or zoledronic acid) if gets more severe.      3) Anemia:    Hgb yesterday 8.7.  Getting retacrit 10,000 units most runs (last week 3/6 and 3/8). Adequate iron stores with ferritin 252 and sats 26%.      Noted Disposition ongoing. Addendum: talked to floor team. Questioned about getting AVF placed. Potential eventual AVF and removal of CVC can be done. If patient still someone in future who is pulling and risk for removing a CVC, would be in same situation as not being safe on dialysis with pulling needles out of an internal access. Still worth while getting vein mapping and seeing if any options for internal access placement.         Plan/Recs:  1) HD tomorrow  per MWF schedule  2) EPO with HD  3) UF as tolerated, appears at good weight currently (last checked 3/10)  4) renal panel in am ordered  5) US b/l arms for vein mapping ordered      Torsten Montaño DO  [facility]  Office: [phone]  Cell: [phone]        Interval History:      Pt with friend at bedside. More alert, jovial.   S/p HD yesterday, ran 3 hrs on 2K bath and 1.5kg UF done. NO events noted overnight.         Medications and Allergies:       - MEDICATION INSTRUCTIONS for Dialysis Patients -   Does not apply See Admin Instructions     apixaban ANTICOAGULANT  5 mg Oral BID     folic acid  1 mg Oral or Feeding Tube Daily     guaiFENesin  600 mg Oral BID     lacosamide  100 mg Oral Q12H     lactulose  10 g Oral BID     levETIRAcetam  1,000 mg Oral  Q24H     lidocaine  1 patch Transdermal Q24H     lidocaine   Transdermal Q8H BIJU     midodrine  10 mg Oral or Feeding Tube TID w/meals     mineral oil-hydrophilic petrolatum   Topical Daily     multivitamin RENAL  1 capsule Oral Daily     [Held by provider] nystatin  500,000 Units Swish & Spit 4x Daily     OLANZapine zydis  5 mg Oral BID     pantoprazole  40 mg Oral QAM AC     ramelteon  8 mg Oral QPM     rifaximin  550 mg Oral or Feeding Tube BID     sodium chloride (PF)  10-30 mL Intracatheter Q8H     sodium chloride (PF)  3 mL Intracatheter Q8H     tacrolimus  1 mg Oral Q12H      No Known Allergies         Physical Exam:   Vitals were reviewed  /66 (BP Location: Left arm)   Pulse 82   Temp 97.8  F (36.6  C) (Oral)   Resp 18   Wt 79.4 kg (175 lb 0.7 oz)   SpO2 96%   BMI 23.74 kg/m      Wt Readings from Last 3 Encounters:   03/10/23 79.4 kg (175 lb 0.7 oz)   01/21/23 82.4 kg (181 lb 11.2 oz)   12/28/22 96 kg (211 lb 10.3 oz)       Intake/Output Summary (Last 24 hours) at 3/16/2023 1031  Last data filed at 3/16/2023 0814  Gross per 24 hour   Intake 660 ml   Output --   Net 660 ml       GENERAL APPEARANCE: sleepy, arousble  HEENT:  Eyes/ears/nose/neck grossly normal  RESP: lungs clear ant/lat.  CV: RRR, nl S1/S2, no m/r/g   ABDOMEN: o/s/nt/nd, bs present  EXTREMITIES/SKIN: no c/c/rashes/lesions; no edema  LINES: right CVC present, no visible abnormalities              Data:     BMP  Recent Labs   Lab 03/14/23  1033 03/13/23  1454 03/12/23  0756 03/11/23  0846    136 137 136   POTASSIUM 4.8 4.1 4.8 4.3   CHLORIDE 100 99 101 99   KELLY 11.1* 10.3* 11.0* 10.9*   CO2 28 28 25 25   BUN 56.4* 36.2* 61.5* 41.0*   CR 3.96* 3.00* 4.44* 3.45*   * 111* 87 91     CBC  Recent Labs   Lab 03/15/23  0935 03/14/23  1033 03/13/23  1454 03/12/23  0756   WBC 4.3 4.0 3.7* 3.5*   HGB 8.7* 8.6* 8.6* 7.9*   HCT 28.7* 29.1* 28.7* 26.2*   MCV 99 101* 100 100   * 128* 129* 116*     Lab Results   Component Value  Date    AST 20 03/14/2023    ALT 8 (L) 03/14/2023    ALKPHOS 177 (H) 03/14/2023    BILITOTAL 0.4 03/14/2023    CHANDLER 64 (H) 03/12/2023     Lab Results   Component Value Date    INR 1.36 (H) 12/21/2022       Attestation:  I have reviewed today's vital signs, notes, medications, labs and imaging.    DO [facility] - Nephrology  Office: [phone]  Cell: [phone]

## 2023-03-17 ENCOUNTER — APPOINTMENT (OUTPATIENT)
Dept: GENERAL RADIOLOGY | Facility: CLINIC | Age: 58
DRG: 981 | End: 2023-03-17
Attending: HOSPITALIST
Payer: COMMERCIAL

## 2023-03-17 ENCOUNTER — APPOINTMENT (OUTPATIENT)
Dept: SPEECH THERAPY | Facility: CLINIC | Age: 58
DRG: 981 | End: 2023-03-17
Attending: STUDENT IN AN ORGANIZED HEALTH CARE EDUCATION/TRAINING PROGRAM
Payer: COMMERCIAL

## 2023-03-17 LAB
ALBUMIN SERPL BCG-MCNC: 3.4 G/DL (ref 3.5–5.2)
ANION GAP SERPL CALCULATED.3IONS-SCNC: 10 MMOL/L (ref 7–15)
BUN SERPL-MCNC: 37.6 MG/DL (ref 6–20)
CALCIUM SERPL-MCNC: 10.3 MG/DL (ref 8.6–10)
CHLORIDE SERPL-SCNC: 97 MMOL/L (ref 98–107)
CREAT SERPL-MCNC: 2.85 MG/DL (ref 0.67–1.17)
DEPRECATED HCO3 PLAS-SCNC: 28 MMOL/L (ref 22–29)
GFR SERPL CREATININE-BSD FRML MDRD: 25 ML/MIN/1.73M2
GLUCOSE SERPL-MCNC: 92 MG/DL (ref 70–99)
PHOSPHATE SERPL-MCNC: 3.6 MG/DL (ref 2.5–4.5)
POTASSIUM SERPL-SCNC: 4.2 MMOL/L (ref 3.4–5.3)
SODIUM SERPL-SCNC: 135 MMOL/L (ref 136–145)

## 2023-03-17 PROCEDURE — 250N000013 HC RX MED GY IP 250 OP 250 PS 637: Performed by: INTERNAL MEDICINE

## 2023-03-17 PROCEDURE — 92611 MOTION FLUOROSCOPY/SWALLOW: CPT | Mod: GN

## 2023-03-17 PROCEDURE — 92526 ORAL FUNCTION THERAPY: CPT | Mod: GN

## 2023-03-17 PROCEDURE — 99221 1ST HOSP IP/OBS SF/LOW 40: CPT | Performed by: SURGERY

## 2023-03-17 PROCEDURE — 250N000013 HC RX MED GY IP 250 OP 250 PS 637

## 2023-03-17 PROCEDURE — 250N000011 HC RX IP 250 OP 636: Performed by: INTERNAL MEDICINE

## 2023-03-17 PROCEDURE — 250N000013 HC RX MED GY IP 250 OP 250 PS 637: Performed by: HOSPITALIST

## 2023-03-17 PROCEDURE — 250N000012 HC RX MED GY IP 250 OP 636 PS 637: Performed by: STUDENT IN AN ORGANIZED HEALTH CARE EDUCATION/TRAINING PROGRAM

## 2023-03-17 PROCEDURE — 90935 HEMODIALYSIS ONE EVALUATION: CPT | Performed by: INTERNAL MEDICINE

## 2023-03-17 PROCEDURE — 634N000001 HC RX 634: Performed by: INTERNAL MEDICINE

## 2023-03-17 PROCEDURE — 99232 SBSQ HOSP IP/OBS MODERATE 35: CPT | Performed by: HOSPITALIST

## 2023-03-17 PROCEDURE — 250N000013 HC RX MED GY IP 250 OP 250 PS 637: Performed by: STUDENT IN AN ORGANIZED HEALTH CARE EDUCATION/TRAINING PROGRAM

## 2023-03-17 PROCEDURE — 74230 X-RAY XM SWLNG FUNCJ C+: CPT

## 2023-03-17 PROCEDURE — 80069 RENAL FUNCTION PANEL: CPT | Performed by: INTERNAL MEDICINE

## 2023-03-17 PROCEDURE — 120N000001 HC R&B MED SURG/OB

## 2023-03-17 PROCEDURE — 258N000003 HC RX IP 258 OP 636: Performed by: INTERNAL MEDICINE

## 2023-03-17 PROCEDURE — 90937 HEMODIALYSIS REPEATED EVAL: CPT

## 2023-03-17 PROCEDURE — 36415 COLL VENOUS BLD VENIPUNCTURE: CPT | Performed by: INTERNAL MEDICINE

## 2023-03-17 RX ORDER — HEPARIN SODIUM 1000 [USP'U]/ML
2000 INJECTION, SOLUTION INTRAVENOUS; SUBCUTANEOUS ONCE
Status: COMPLETED | OUTPATIENT
Start: 2023-03-17 | End: 2023-03-17

## 2023-03-17 RX ADMIN — PANTOPRAZOLE SODIUM 40 MG: 40 TABLET, DELAYED RELEASE ORAL at 12:33

## 2023-03-17 RX ADMIN — FOLIC ACID 1 MG: 1 TABLET ORAL at 12:33

## 2023-03-17 RX ADMIN — LIDOCAINE 1 PATCH: 560 PATCH PERCUTANEOUS; TOPICAL; TRANSDERMAL at 12:38

## 2023-03-17 RX ADMIN — LACOSAMIDE 100 MG: 100 TABLET, FILM COATED ORAL at 12:23

## 2023-03-17 RX ADMIN — ACETAMINOPHEN 650 MG: 325 TABLET, FILM COATED ORAL at 09:55

## 2023-03-17 RX ADMIN — GUAIFENESIN 600 MG: 600 TABLET, EXTENDED RELEASE ORAL at 12:24

## 2023-03-17 RX ADMIN — TACROLIMUS 1 MG: 1 CAPSULE ORAL at 21:58

## 2023-03-17 RX ADMIN — LEVETIRACETAM 1000 MG: 500 TABLET, FILM COATED ORAL at 00:37

## 2023-03-17 RX ADMIN — ACETAMINOPHEN 650 MG: 325 TABLET, FILM COATED ORAL at 00:46

## 2023-03-17 RX ADMIN — WHITE PETROLATUM: 1.75 OINTMENT TOPICAL at 12:38

## 2023-03-17 RX ADMIN — RIFAXIMIN 550 MG: 550 TABLET ORAL at 21:58

## 2023-03-17 RX ADMIN — OLANZAPINE 5 MG: 5 TABLET, ORALLY DISINTEGRATING ORAL at 21:58

## 2023-03-17 RX ADMIN — SODIUM CHLORIDE 250 ML: 9 INJECTION, SOLUTION INTRAVENOUS at 09:57

## 2023-03-17 RX ADMIN — GUAIFENESIN 600 MG: 600 TABLET, EXTENDED RELEASE ORAL at 21:58

## 2023-03-17 RX ADMIN — TACROLIMUS 1 MG: 1 CAPSULE ORAL at 12:24

## 2023-03-17 RX ADMIN — LACOSAMIDE 100 MG: 100 TABLET, FILM COATED ORAL at 00:37

## 2023-03-17 RX ADMIN — SODIUM CHLORIDE 300 ML: 9 INJECTION, SOLUTION INTRAVENOUS at 09:56

## 2023-03-17 RX ADMIN — HEPARIN SODIUM 2000 UNITS: 1000 INJECTION INTRAVENOUS; SUBCUTANEOUS at 10:37

## 2023-03-17 RX ADMIN — OLANZAPINE 5 MG: 5 TABLET, ORALLY DISINTEGRATING ORAL at 12:23

## 2023-03-17 RX ADMIN — APIXABAN 5 MG: 5 TABLET, FILM COATED ORAL at 21:58

## 2023-03-17 RX ADMIN — HEPARIN SODIUM 2000 UNITS: 1000 INJECTION INTRAVENOUS; SUBCUTANEOUS at 10:38

## 2023-03-17 RX ADMIN — LACTULOSE 10 G: 10 SOLUTION ORAL at 12:24

## 2023-03-17 RX ADMIN — LACTULOSE 10 G: 10 SOLUTION ORAL at 22:02

## 2023-03-17 RX ADMIN — EPOETIN ALFA-EPBX 10000 UNITS: 10000 INJECTION, SOLUTION INTRAVENOUS; SUBCUTANEOUS at 09:59

## 2023-03-17 RX ADMIN — RAMELTEON 8 MG: 8 TABLET, FILM COATED ORAL at 21:58

## 2023-03-17 RX ADMIN — RIFAXIMIN 550 MG: 550 TABLET ORAL at 12:34

## 2023-03-17 RX ADMIN — APIXABAN 5 MG: 5 TABLET, FILM COATED ORAL at 12:33

## 2023-03-17 RX ADMIN — Medication 1 CAPSULE: at 12:23

## 2023-03-17 ASSESSMENT — ACTIVITIES OF DAILY LIVING (ADL)
ADLS_ACUITY_SCORE: 61
ADLS_ACUITY_SCORE: 61
ADLS_ACUITY_SCORE: 63
ADLS_ACUITY_SCORE: 63
ADLS_ACUITY_SCORE: 59
ADLS_ACUITY_SCORE: 61
ADLS_ACUITY_SCORE: 59
ADLS_ACUITY_SCORE: 63
ADLS_ACUITY_SCORE: 61

## 2023-03-17 NOTE — PROGRESS NOTES
Patient is AO X1, A2, denied pain, No 1:1 available for evening shift, HD- 1.5L fluid out, eating well,     No any distress noted. Patient is resting comfortably in bed. Diony Prabhakar RN on 3/17/2023 at 6:14 PM

## 2023-03-17 NOTE — PROGRESS NOTES
Potassium   Date Value Ref Range Status   03/14/2023 4.8 3.4 - 5.3 mmol/L Final   12/29/2022 3.4 3.4 - 5.3 mmol/L Final   04/20/2020 3.9 3.4 - 5.3 mmol/L Final     Potassium POCT   Date Value Ref Range Status   01/19/2023 3.6 3.5 - 5.0 mmol/L Final     Hemoglobin   Date Value Ref Range Status   03/15/2023 8.7 (L) 13.3 - 17.7 g/dL Final   04/20/2020 14.3 13.3 - 17.7 g/dL Final     Creatinine   Date Value Ref Range Status   03/14/2023 3.96 (H) 0.67 - 1.17 mg/dL Final   04/20/2020 1.06 0.66 - 1.25 mg/dL Final     Urea Nitrogen   Date Value Ref Range Status   03/14/2023 56.4 (H) 6.0 - 20.0 mg/dL Final   12/29/2022 46 (H) 7 - 30 mg/dL Final   04/20/2020 23 7 - 30 mg/dL Final     Sodium   Date Value Ref Range Status   03/14/2023 138 136 - 145 mmol/L Final   04/20/2020 139 133 - 144 mmol/L Final     INR   Date Value Ref Range Status   12/21/2022 1.36 (H) 0.85 - 1.15 Final   10/07/2019 1.20 (H) 0.86 - 1.14 Final       DIALYSIS PROCEDURE NOTE  Hepatitis status of previous patient on machine log was checked and verified ok to use with this patients hepatitis status.  Patient dialyzed for 3 hrs. on a K2 bath with a net fluid removal of  1.5L.  A BFR of 250-300 ml/min was obtained via a RIJ catheter.    The treatment plan was discussed with Dr. Montaño during the treatment.    Total heparin received during the treatment: 0 units.  Line flushed, clamped and capped with heparin 1:1000 1.9 mL (1900 units) per lumen    Meds  given: EPO 10,000units IV  Complications: Arterial collapse multiple times reversed. Positional at times, laying on Lt side seems ok with flow. Dr Montaño notified.      Person educated: pt. Knowledge base minimal. Barriers to learning: confusion. Educated on access care via verbal mode. Patient Needs reinforcement assess at a later date.Pt prefers verbal education style.     ICEBOAT? Timeout performed pre-treatment  I: Patient was identified using 2 identifiers  C:  Consent Signed Yes  E: Equipment preventative  maintenance is current and dialysis delivery system OK to use  B: Hepatitis B Surface Antigen: neg; Draw Date: 3/9/23      Hepatitis B Surface Antibody: succept; Draw Date: 3/9/2023  O: Dialysis orders present and complete prior to treatment  A: Vascular access verified and assessed prior to treatment  T: Treatment was performed at a clinically appropriate time  ?: Patient was allowed to ask questions and address concerns prior to treatment  See Adult Hemodialysis flowsheet in EPIC for further details and post assessment.  Machine water alarm in place and functioning. Transducer pods intact and checked every 15min.   Pt returned via bed.  Chlorine/Chloramine water system checked every 4 hours.  Outpatient Dialysis at D    Patient repositioned every 2 hours during the treatment.  Post treatment report given to SCOTT Prabhakar RN regarding 1.5L of fluid removed, last BP of 136/84, and patient pain rating of 0/10.

## 2023-03-17 NOTE — CONSULTS
VASCULAR SURGERY INPATIENT CONSULTATION / Initial In-Patient visit    VASCULAR SURGEON: Dr. Mcneil    LOCATION: [facility]    Blanco Osborne  Medical Record #:  [id number]  YOB: 1965  Age:  57 year old     Date of Service: 1/21/2023    PRIMARY CARE PROVIDER: No Ref-Primary, Physician    Reason for consultation:  AVF evaluation/ESRD    Mr. Blanco Osborne is a 57 year old male who was admitted on 1/21/2023 from [facility] for pleural effusion. Has been dialyzing for several months through his tunneled catheter. Has needed one to one sitter for safety and has pulled out numerous lines including attempts to pull out tunneled catheter.     The patient had vein mapping done yesterday, wishes to have fistula placed on left side.        RECOMMENDATION:  Will plan for left sided AVF creation with Dr. Mcneil next week, hopefully next Tuesday as patient dialyzes MWF. Continue current medications.         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     NAFLD (nonalcoholic fatty liver disease) 2/11/2016     CHRISTIAN on CPAP      Parainfluenza type 1 infection 11/19/2022     SBP (spontaneous bacterial peritonitis) (H)     [facility]     Sepsis due to Streptococcus pneumoniae with acute hypoxic respiratory failure (H) 11/19/2022     Tubular adenoma 10/2019    Large cecal adenoma- due for surveillance colonoscopy in 3 years (10/2022)         Past Surgical History:   Procedure Laterality Date     APPENDECTOMY       BENCH LIVER N/A 9/20/2016    Procedure: BENCH LIVER;  Surgeon: Enoc Crews MD;  Location: [facility] OR     BRONCHOSCOPY FLEXIBLE AND RIGID N/A 12/20/2022    Procedure: BRONCHOSCOPY;  Surgeon: Alena Valenzuela MD;  Location:  GI      COLONOSCOPY  13    repeat in 2018     COLONOSCOPY N/A 10/4/2019    Procedure: COLONOSCOPY, WITH POLYPECTOMY AND BIOPSY;  Surgeon: Go Chong MD;  Location: [facility] OR     HERNIA REPAIR       IR CHEST TUBE PLACEMENT NON-TUNNELED RIGHT  2022     IR CVC TUNNEL PLACEMENT > 5 YRS OF AGE  2022     IR GASTROSTOMY TUBE CHANGE  2023     IR GASTROSTOMY TUBE PERCUTANEOUS PLCMNT  2022     IR PARACENTESIS  2022     IR PARACENTESIS  2022     IR THORACENTESIS  2022     IR TRANSCATHETER BIOPSY  2022     TRACHEOSTOMY N/A 2022    Procedure: TRACHEOSTOMY;  Surgeon: Nilesh Jackson MD;  Location: [facility] OR     TRANSPLANT LIVER RECIPIENT  DONOR N/A 2016    Procedure: TRANSPLANT LIVER RECIPIENT  DONOR;  Surgeon: Enoc Crews MD;  Location: [facility] OR        ALLERGIES:   No Known Allergies     MEDS:    Current Facility-Administered Medications:      - MEDICATION INSTRUCTIONS for Dialysis Patients -, , Does not apply, See Admin Instructions, Lavonne Pal MD     0.9% sodium chloride BOLUS, 100-150 mL, Intravenous, Q15 Min PRN, Torsten Montaño DO     acetaminophen (TYLENOL) tablet 325-650 mg, 325-650 mg, Oral, Q4H PRN, 650 mg at 23 0955 **OR** acetaminophen (TYLENOL) Suppository 650 mg, 650 mg, Rectal, Q4H PRN, Brenden Armando MD     acetylcysteine (MUCOMYST) 20 % nebulizer solution 2 mL, 2 mL, Nebulization, BID PRN, Michael Chou MD     albuterol (PROVENTIL) neb solution 2.5 mg, 2.5 mg, Nebulization, Q2H PRN, Lavonne Pal MD     apixaban ANTICOAGULANT (ELIQUIS) tablet 5 mg, 5 mg, Oral, BID, Lavonne Pal MD, 5 mg at 23     benzonatate (TESSALON) capsule 100 mg, 100 mg, Oral, TID PRN, Ashkan Hernandez MD, 100 mg at 23 1350     calcium carbonate (TUMS) chewable tablet 500 mg, 500 mg, Oral, 4x Daily PRN, Lavonne Pal MD     carboxymethylcellulose PF (REFRESH PLUS) 0.5 % ophthalmic  solution 1 drop, 1 drop, Both Eyes, Q1H PRN, Pako Tan MD     glucose gel 15-30 g, 15-30 g, Oral, Q15 Min PRN **OR** dextrose 50 % injection 25-50 mL, 25-50 mL, Intravenous, Q15 Min PRN **OR** glucagon injection 1 mg, 1 mg, Subcutaneous, Q15 Min PRN, Lavonne Pal MD     folic acid (FOLVITE) tablet 1 mg, 1 mg, Oral or Feeding Tube, Daily, Kb [place], [facility], 1 mg at 03/16/23 0828     guaiFENesin (MUCINEX) 12 hr tablet 600 mg, 600 mg, Oral, BID, Renny Erwin MD, 600 mg at 03/16/23 2052     ipratropium - albuterol 0.5 mg/2.5 mg/3 mL (DUONEB) neb solution 3 mL, 3 mL, Nebulization, Q4H PRN, Michael Chou MD     lacosamide (VIMPAT) tablet 100 mg, 100 mg, Oral, Q12H, Ashkan Hernandez MD, 100 mg at 03/17/23 0037     lactulose (CHRONULAC) solution 10 g, 10 g, Oral, BID, Carl Green MD, 10 g at 03/16/23 0827     levETIRAcetam (KEPPRA) tablet 1,000 mg, 1,000 mg, Oral, Q24H, Ashkan Hernandez MD, 1,000 mg at 03/17/23 0037     Lidocaine (LIDOCARE) 4 % Patch 1 patch, 1 patch, Transdermal, Q24H, Meredith Waldrop DO, 1 patch at 03/15/23 0759     lidocaine (LMX4) cream, , Topical, Q1H PRN, Lavonne Pal MD     lidocaine 1 % 0.1-1 mL, 0.1-1 mL, Other, Q1H PRN, Lavonne Pal MD     lidocaine patch in PLACE, , Transdermal, Q8H BIJU, Meredith Waldrop DO     melatonin tablet 6 mg, 6 mg, Oral or Feeding Tube, At Bedtime PRN, Jana Luz, FARSHAD, 6 mg at 03/15/23 0226     midodrine (PROAMATINE) tablet 10 mg, 10 mg, Oral or Feeding Tube, TID w/meals, BradshawZenon MD, 10 mg at 03/16/23 1756     mineral oil-hydrophilic petrolatum (AQUAPHOR), , Topical, BID PRN, Mansoor Huerta MD, Given at 03/12/23 2042     mineral oil-hydrophilic petrolatum (AQUAPHOR), , Topical, Daily, Mansoor Huerta MD, Given at 03/16/23 0830     multivitamin RENAL (NEPHROCAPS/TRIPHROCAPS) capsule 1 capsule, 1 capsule, Oral, Daily, Kb Moulton MD, 1 capsule at 03/16/23 0828     naloxone  (NARCAN) injection 0.2 mg, 0.2 mg, Intravenous, Q2 Min PRN **OR** naloxone (NARCAN) injection 0.4 mg, 0.4 mg, Intravenous, Q2 Min PRN **OR** naloxone (NARCAN) injection 0.2 mg, 0.2 mg, Intramuscular, Q2 Min PRN **OR** naloxone (NARCAN) injection 0.4 mg, 0.4 mg, Intramuscular, Q2 Min PRN, Lavonne Pal MD     nitroGLYcerin (NITROSTAT) sublingual tablet 0.4 mg, 0.4 mg, Sublingual, Q5 Min PRN, Lavonne Pal MD     No lozenges or gum should be given while patient on BIPAP/AVAPS/AVAPS AE, , Does not apply, Continuous PRN, Pako Tan MD     [Held by provider] nystatin (MYCOSTATIN) suspension 500,000 Units, 500,000 Units, Swish & Spit, 4x Daily, Lavonne Pal MD, 500,000 Units at 03/01/23 1219     OLANZapine zydis (zyPREXA) ODT tab 5 mg, 5 mg, Oral, BID, Janette Gutierrez APRN CNP, 5 mg at 03/16/23 2053     OLANZapine zydis (zyPREXA) ODT tab 5 mg, 5 mg, Oral, BID PRN, Rylie Jenkins MD, 5 mg at 03/08/23 2152     ondansetron (ZOFRAN) injection 4 mg, 4 mg, Intravenous, Q6H PRN, 4 mg at 01/30/23 1349 **OR** ondansetron (ZOFRAN ODT) ODT tab 4 mg, 4 mg, Oral, Q6H PRN, Meredith Waldrop DO, 4 mg at 03/03/23 1251     pantoprazole (PROTONIX) EC tablet 40 mg, 40 mg, Oral, QAM AC, Pako Tan MD, 40 mg at 03/16/23 0828     Patient may continue current oral medications, , Does not apply, Continuous PRN, Pako Tan MD     ramelteon (ROZEREM) tablet 8 mg, 8 mg, Oral, QPM, Ashkan Hernandez MD, 8 mg at 03/16/23 2052     rifaximin (XIFAXAN) tablet 550 mg, 550 mg, Oral or Feeding Tube, BID, Lavonne Pal MD, 550 mg at 03/16/23 2053     simethicone (MYLICON) chewable tablet 80 mg, 80 mg, Oral, Q6H PRN, Carol Garcia MD, 80 mg at 02/12/23 1553     sodium chloride (PF) 0.9% PF flush 3 mL, 3 mL, Intracatheter, Q8H, Lavonne Pal MD, 3 mL at 03/16/23 7186     sodium chloride (PF) 0.9% PF flush 3 mL, 3 mL, Intracatheter, q1 min prn, Lavonne Pal MD, 3 mL at 03/15/23 4550      tacrolimus (GENERIC EQUIVALENT) capsule 1 mg, 1 mg, Oral, Q12H, Ashkan Hernandez MD, 1 mg at 03/16/23 2053     urea (CARMOL) 10 % cream, , Topical, BID PRN, Brenden Armando MD, Given at 02/12/23 1319     SOCIAL HABITS:    Tobacco Use      Smoking status: Never      Smokeless tobacco: Never     Social History    Substance and Sexual Activity      Alcohol use: Not Currently        Alcohol/week: 0.0 standard drinks       History   Drug Use No        FAMILY HISTORY:  No family history on file.    REVIEW OF SYSTEMS:    A 12 point ROS was reviewed and except for what is listed in the HPI above, all others are negative    PE:    Vital signs:  Temp: 97.8  F (36.6  C) Temp src: Oral BP: (!) 144/87 Pulse: 83   Resp: 28 SpO2: 95 % O2 Device: None (Room air) Oxygen Delivery: 1 LPM   Weight: 79.4 kg (175 lb 0.7 oz)  Estimated body mass index is 23.74 kg/m  as calculated from the following:    Height as of 11/12/22: 1.829 m (6').    Weight as of this encounter: 79.4 kg (175 lb 0.7 oz).       Wt Readings from Last 1 Encounters:   03/10/23 79.4 kg (175 lb 0.7 oz)     Body mass index is 23.74 kg/m .          DIAGNOSTIC STUDIES:     Images:  CT Abdomen w/o Contrast    Result Date: 3/9/2023  CT ABDOMEN WITHOUT CONTRAST  3/9/2023 8:15 AM HISTORY: Splenomegaly. Question splenic infarct. COMPARISON: Chest CT from March 7, 2023, chest, abdomen and pelvis CT from January 6, 2023 images are not available for comparison. Abdomen and pelvis CT from December 19, 2022. TECHNIQUE: Volumetric helical acquisition of CT images of the abdomen without contrast. Radiation dose for this scan was reduced using automated exposure control, adjustment of the mA and/or kV according to patient size, or iterative reconstruction technique. FINDINGS: Wedge-shaped area in the spleen again noted suspicious for a small splenic infarct. Spleen measures 18.3 cm in craniocaudal dimension, 19.3 cm in AP dimension. Small to moderate amount of  ascites. Nonobstructing renal stone on the right measuring 5 mm in the upper pole. No other urolithiasis. No hydronephrosis. No dilated bowel. No focal liver lesions. Pancreas and adrenal glands grossly unremarkable. No frankly destructive bony lesions.     IMPRESSION: 1. Splenomegaly with a small probable infarct superiorly, infarct is new since the most recent available abdomen comparison. 2. Small to moderate ascites. HANS WESTON MD   SYSTEM ID:  [id number]    XR Chest 2 Views    Result Date: 3/4/2023  EXAM: XR CHEST 2 VIEWS LOCATION: [facility] DATE/TIME: 3/4/2023 2:42 PM INDICATION: Cough SOB COMPARISON: 2/3/2023     IMPRESSION: Prominent cardiomegaly unchanged with coronal IJ dialysis catheter in place. Pulmonary vessels appear normal. Some increase in opacity at right base likely represents a combination of small right pleural effusion and atelectasis/infiltrate. Upper lung zones remain clear.    Echocardiogram Complete    Result Date: 3/8/2023  [id number]^JALEEL^GONZALO^JOSEP  [facility] [address] [place] [address]  Name: MARY JO CRAIN MRN: [id number] : 1965 Study Date: 2023 01:42 PM Age: 57 yrs Gender: Male Patient Location: [facility] Reason For Study: Endocarditis Ordering Physician: GONZALO MARMOLEJO Referring Physician: Parul Ref-Primary, Physician Performed By: Starla Swanson RDCS  BSA: 2.0 m2 Height: 72 in Weight: 174 lb HR: 84 BP: 84/57 mmHg ______________________________________________________________________________ Procedure Complete Portable Echo Adult. ______________________________________________________________________________ Interpretation Summary  Pt requested exam be done; no contrast used pt agitated and confused. Limited images were obtained, suboptimal study Left ventricular systolic function is normal. Distal septum and apex now well seen,  cannot assess wall motion in this zone. Normal wall motion listed on echo 12/20/22 Trivial aortic valve stenosis The aortic valve is trileaflet with aortic valve sclerosis. There is mild (1+) aortic regurgitation. The ascending aorta is Mildly dilated. Sinus Valsalva 38mm, Asc aorta 39mm Small posterior pericardial effusion 7-8mm posterior pericardial effusion (no tamponade) No obvious vegetations seen on this somewhat suboptimal study. Consider FRANKLIN if stong concern of SBE ______________________________________________________________________________ Left Ventricle The left ventricle is normal in size. There is borderline concentric left ventricular hypertrophy. Left ventricular systolic function is normal. Left ventricular diastolic function is not assessable. Distal septum and apex now well seen, cannot assess wall motion in this zone. Normal wall motion listed on echo 12/20/22. Regional wall motion abnormalities cannot be excluded due to limited visualization.  Right Ventricle The right ventricle is normal in size and function.  Atria Normal left atrial size. Right atrial size is normal.  Mitral Valve There is moderate mitral annular calcification.  Tricuspid Valve Right ventricular systolic pressure could not be approximated due to inadequate tricuspid regurgitation.  Aortic Valve The aortic valve is trileaflet with aortic valve sclerosis. There is mild (1+) aortic regurgitation. Trivial aortic valve stenosis.  Pulmonic Valve The pulmonic valve is not well seen, but is grossly normal.  Vessels The ascending aorta is Mildly dilated. Sinus Valsalva 38mm, Asc aorta 39mm. Inferior vena cava not well visualized for estimation of right atrial pressure.  Pericardium Small posterior pericardial effusion. 7-8mm posterior pericardial effusion (no tamponade).  ______________________________________________________________________________ MMode/2D Measurements & Calculations IVSd: 1.2 cm LVIDd: 4.0 cm LVIDs: 2.5 cm LVPWd:  1.2 cm FS: 37.7 % LV mass(C)d: 165.5 grams LV mass(C)dI: 82.4 grams/m2  Ao root diam: 3.8 cm LA dimension: 3.5 cm asc Aorta Diam: 3.9 cm LA/Ao: 0.91 RWT: 0.60  Doppler Measurements & Calculations MV E max brain: 43.5 cm/sec MV A max brain: 61.8 cm/sec MV E/A: 0.70 MV dec time: 0.18 sec Ao V2 max: 203.2 cm/sec Ao max P.0 mmHg Ao V2 mean: 150.2 cm/sec Ao mean PG: 10.0 mmHg Ao V2 VTI: 41.3 cm PA acc time: 0.08 sec E/E' av.5 Lateral E/e': 6.9 Medial E/e': 12.1  ______________________________________________________________________________ Report approved by: Tereso Allan 2023 02:53 PM       US Upper Extremity Venous Mapping Bilateral    Result Date: 3/16/2023   UPPER EXTREMITY VENOUS MAPPING BILATERAL  3/16/2023 3:12 PM HISTORY: End-stage renal disease. Preoperative evaluation prior to dialysis access creation. COMPARISON: None FINDINGS: Right upper extremity: Cephalic vein: The right cephalic vein is patent. Its diameters in the arm range between 3.8 to 2.0 mm. The vein divides into two branches in the distal arm. Diameters of a lateral branch range between 4.6 to 1.5 mm. Diameters of a medial branch range between 3.0 to 2.7 mm. The distal portion of the medial branch is obscured due to IV site dressing. The right basilic vein is patent in the arm to proximal forearm. Its diameters range between 4.7 to 1.9 mm. Left upper extremity: The left cephalic vein is patent. Its diameters in the arm range between 2.7 to 1.5 mm. Its diameters in the forearm range between 2.7 to 1.9 mm. The left basilic vein is patent in the arm and proximal forearm. Its diameters range between 6.0 to 3.7 mm.     IMPRESSION: Vein mapping performed as above. The left basilic vein appears to be the largest caliber vein. DANIELA CRUZ MD   SYSTEM ID:  [id number]    CT Chest w/o Contrast    Result Date: 3/9/2023  CT CHEST WITHOUT CONTRAST 3/7/2023 2:41 PM HISTORY: RLL lesion. COMPARISON: 2023 TECHNIQUE: Volumetric  helical acquisition of CT images of the chest from the clavicles to the kidneys were acquired without IV contrast. Radiation dose for this scan was reduced using automated exposure control, adjustment of the mA and/or kV according to patient size, or iterative reconstruction technique. FINDINGS: LUNGS AND PLEURA: Minimal improvement in right pleural effusion, pleural thickening since comparison. Similar associated atelectasis vs. less likely infiltrate. Similar minimal left pleural effusion with associated compressive atelectasis and/or infiltrate. Stable granulomatous change. MEDIASTINUM/AXILLAE: No gross adenopathy in the absence of contrast. Mildly enlarged pulmonary artery similar to previous, question pulmonary hypertension. No aortic aneurysm. CORONARY ARTERY CALCIFICATIONS: Moderate. UPPER ABDOMEN: Splenomegaly is again evident measuring 20 cm. There is a new wedge-shaped area of decreased attenuation, this could represent a splenic infarct. MUSCULOSKELETAL: No frankly destructive bony lesions.     IMPRESSION: 1.  Slight improvement in right pleural effusion and pleural thickening since the comparison study. 2.  Similar bilateral infiltrates and minimal left effusion. 3.  New wedge-shaped area of decreased attenuation in the spleen, question a splenic infarct. HANS WESTON MD   SYSTEM ID:  [id number]    CT Head w/o Contrast    Result Date: 3/8/2023  EXAM: CT HEAD W/O CONTRAST LOCATION: [facility] DATE/TIME: 3/8/2023 6:06 PM INDICATION: confusion COMPARISON: 12/20/2022 TECHNIQUE: Routine CT Head without IV contrast. Multiplanar reformats. Dose reduction techniques were used. FINDINGS: INTRACRANIAL CONTENTS: No intracranial hemorrhage, extraaxial collection, or mass effect.  No CT evidence of acute infarct. Mild presumed chronic small vessel ischemic changes. Tiny low-attenuation lesion inferior to the basal ganglia likely represents a  prominent perivascular space. Mild generalized  volume loss. No hydrocephalus. VISUALIZED ORBITS/SINUSES/MASTOIDS: No intraorbital abnormality. Mild mucosal thickening scattered about the paranasal sinuses. No middle ear or mastoid effusion. BONES/SOFT TISSUES: No acute abnormality.     IMPRESSION: 1.  No CT evidence for acute intracranial process. 2.  Brain atrophy and presumed chronic microvascular ischemic changes as above.    XR Video Swallow with SLP or OT    Result Date: 3/3/2023  VIDEO SWALLOWING EVALUATION   3/3/2023 2:21 PM HISTORY: reassess thin liquids COMPARISON: 2/23/2023 swallow study. FLUOROSCOPY TIME: 1.2 minutes Number of cine runs: 8 FINDINGS: Thin liquid consistency demonstrated an episode of flash penetration and an episode of deep penetration that cleared with subsequent cough. No serge aspiration on thin liquid consistency. Residual in the vallecula is seen with thin liquid consistency on each swallow. Puree consistency taken with teaspoon and with regular food demonstrates residual without penetration or aspiration. This study only includes the cervical esophagus. Please see the speech pathologist report for further details. DARREN EVANS MD   SYSTEM ID:  [id number]    XR Video Swallow with SLP or OT    Result Date: 2/23/2023  VIDEO SWALLOWING EVALUATION   2/23/2023 10:08 AM HISTORY: Dysphagia, trach history. COMPARISON: None. FLUOROSCOPY TIME: 1.4 minutes. Number of cine runs: 13 FINDINGS:  The patient had an episode of a small amount of silent aspiration with thin barium. The patient is available able to swallow mildly thickened barium with intermittent penetration, but no aspiration. Puree and semisolid consistencies were swallowed without difficulty. Please see the speech pathologist's note for additional details and recommendations. This study only includes the cervical esophagus. SONIA SANDY MD   SYSTEM ID:  [id number]    US Paracentesis with Albumin    Result Date: 3/6/2023  US PARACENTESIS WITH ALBUMIN 3/6/2023 3:22 PM CLINICAL  HISTORY: diagnostic and therapeutic PROCEDURE: Informed consent obtained. Time out performed. The abdomen was prepped and draped in a sterile fashion. 10 mL of 1% lidocaine was infused into local soft tissues. A 5 Uzbek catheter system was introduced into the abdominal ascites under ultrasound guidance. 3.9 liters of clear fluid were removed and sent to lab if requested. Patient tolerated procedure well. Ultrasound imaging was obtained and placed in the patient's permanent medical record.     IMPRESSION: 1.  Status post ultrasound-guided paracentesis. DYANA SANCHEZ MD   SYSTEM ID:  [id number]        LABS:      Sodium   Date Value Ref Range Status   03/14/2023 138 136 - 145 mmol/L Final   03/13/2023 136 136 - 145 mmol/L Final   03/12/2023 137 136 - 145 mmol/L Final   04/20/2020 139 133 - 144 mmol/L Final   10/07/2019 131 (L) 133 - 144 mmol/L Final   02/20/2019 137 133 - 144 mmol/L Final     Urea Nitrogen   Date Value Ref Range Status   03/14/2023 56.4 (H) 6.0 - 20.0 mg/dL Final   03/13/2023 36.2 (H) 6.0 - 20.0 mg/dL Final   03/12/2023 61.5 (H) 6.0 - 20.0 mg/dL Final   12/29/2022 46 (H) 7 - 30 mg/dL Final   12/28/2022 62 (H) 7 - 30 mg/dL Final   12/27/2022 52 (H) 7 - 30 mg/dL Final   04/20/2020 23 7 - 30 mg/dL Final   10/07/2019 18 7 - 30 mg/dL Final   02/20/2019 20 7 - 30 mg/dL Final     Hemoglobin   Date Value Ref Range Status   03/15/2023 8.7 (L) 13.3 - 17.7 g/dL Final   03/14/2023 8.6 (L) 13.3 - 17.7 g/dL Final   03/13/2023 8.6 (L) 13.3 - 17.7 g/dL Final   04/20/2020 14.3 13.3 - 17.7 g/dL Final   10/07/2019 13.7 13.3 - 17.7 g/dL Final   02/20/2019 13.7 13.3 - 17.7 g/dL Final     Platelet Count   Date Value Ref Range Status   03/15/2023 137 (L) 150 - 450 10e3/uL Final   03/14/2023 128 (L) 150 - 450 10e3/uL Final   03/13/2023 129 (L) 150 - 450 10e3/uL Final   04/20/2020 108 (L) 150 - 450 10e9/L Final   10/07/2019 194 150 - 450 10e9/L Final   02/20/2019 125 (L) 150 - 450 10e9/L Final     INR   Date Value Ref Range  Status   12/21/2022 1.36 (H) 0.85 - 1.15 Final   12/16/2022 1.14 0.85 - 1.15 Final   12/16/2022 1.58 (H) 0.85 - 1.15 Final   10/07/2019 1.20 (H) 0.86 - 1.14 Final   10/17/2017 1.26 (H) 0.86 - 1.14 Final   09/19/2017 1.15 (H) 0.86 - 1.14 Final       Total time spent 35 minutes face to face with patient with more than 50% time spent in counseling and coordination of care.    Saima Coker NP  [facility] Surgery    STAFF: Agree with above.  Patient examined while on dialysis via his right jugular tunneled catheter.  He is very frail with thin arms.  +3 radial pulses bilaterally.  IV in right forearm but not vein cephalic vein.    Vein mapping reveals small cephalic veins larger on the right than left side.  Would plan for left arm fistula per patient's request.  We will mapped the cephalic vein with a tourniquet in preinduction to see if this is feasible otherwise placement may be better on the larger veins in the dominant right arm.  Would also consider a left upper arm proximal radial to cephalic fistula depending on the size of the arm to keep the fistula in his nondominant arm.    We will try to schedule the surgical procedure for this coming week since he will be in the hospital for several days.  This performed under local anesthetic with sedation and not requiring a general anesthetic.      35 minutes with patient today.    Deejay Mcneil MD

## 2023-03-17 NOTE — PLAN OF CARE
Goal Outcome Evaluation:    Prolonged hospital course with concerns of hepatic encephalopathy, end-stage renal disease on dialysis, past PEA arrest, seizure disorder, and chronic liver disease with prior liver transplant.  DATE & TIME: 3/16 6619-6813                      Cognitive Concerns/ Orientation : A/Ox1-2; orientation fluctuates. Disoriented to time/place consistently. At times more clear and able to answer simple questions.   BEHAVIOR & AGGRESSION TOOL COLOR: Green, calm and cooperative. Tired most and slept throughout early evening.  Needed redirection following rest in evening. Family visited and was very helpful with redirecting pt.     ABNL VS/O2: VSS on RA. Unlabored breathing. Infrequent cough noted.   MOBILITY: Up with assist of two and mark steady. Up to the chair for meals. T/R q2hr when in bed.   PAIN MANAGMENT: denied pain and did not mention chronic lower back pain.  DIET: Regular diet with mildly thickened liquids. Encourage TF if pt eats less than 50% of the meal - pt ate approx 75% of dinner - Takes pills whole with thickened liquids. Video swallow ordered  BOWEL/BLADDER: continent of bowel this shift, BM x 2, Lactulose held - 3 BM per day is goal. Dialysis pt, no urine output this shift.   ABNL LAB/BG:n/a  DRAIN/DEVICES: R chest CVC/double lumen for hemodialysis; R PIV saline locked; PEG tube/clamped  SKIN: Scattered bruising and scabs; skin tear L neck covered with mepilex;  TESTS/PROCEDURES: US of UE vein mapping  D/C DATE: Pending placement, SW is following. Per PT, TCU recommended. Nephrology is following.   Discharge Barriers: restlessness, w/o sitter 24hrs  OTHER IMPORTANT INFO: Family is visiting. Dialysis MWF.

## 2023-03-17 NOTE — PROGRESS NOTES
[facility]    Medicine Progress Note - Hospitalist Service    Date of Admission:  1/21/2023    Assessment & Plan   Blanco Osborne is a 57 year-old male admitted on 1/21/2023 as a transfer from [facility] for evaluation of loculated pleural effusion. He has extensive PMH including h/o liver transplant 2016 due to alcohol abuse, pAF on chronic anticoagulation, history of diabetes mellitus type 2, CVA, hypertension, CHRISTIAN on BiPAP.  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 He was recovering in [facility] and was transferred to [facility] for consideration of chest tube placement and possible intrapleural lysis for worsening effusion.     Acute metabolic encephalopathy with delirium, multifactorial  Hepatic encephalopathy  Chronic liver disease, history of liver transplant 2016  End-stage renal disease (ESRD), on hemodialysis (HD)  History of seizure, on Keppra and Vimpat  Waxing and waning mentation, coinciding with lactulose being held at [facility]  Earlier in hospital stay, remained confused and had pulled out tracheostomy tube, PICC line and pulled his dialysis catheter.  Lines replaced.  Palliative care consult 1/26, see note.  * Psychiatry consulted, recent follow-up 2/21, 2/28, see notes.    Complicated, prolonged hospital course with concerns of hepatic encephalopathy, end-stage renal disease on dialysis, past PEA arrest, seizure disorder, and chronic liver disease with prior liver transplant.  Mental status continues to fluctuate requiring one-to-one sitter as needed for added safety and supervision.  Will continue to reorient as needed and reassess 1:1 as able.  Avoid restraints if possible with goals of safety and close supervision.  Maintain normal day/night cycles and sleep-wake  cycles.  Minimize sedating medications as able.  Remains weak and deconditioned with complex, prolonged hospital course and limited mobility.    Continue Lactulose to 10 mg BID, monitor for symptoms; goal to have 2-3 bowel movements per day    Ongoing hemodialysis, as per nephrology, 3X/week dialysis schedule; ongoing nephrology consult follow-up appreciated    Continue ramelteon 8 mg at bedtime; monitor for side effects    Continue olanzapine (Zyprexa) 5 mg bid and as needed; monitor for side effects    Reconsult psychiatry as needed     Hypomagnesemia    Monitor, replacement as per nephrology, on dialysis     Bilateral pleural effusions   Acute respiratory failure requiring tracheostomy  - resolved    Pneumonia  - resolved   ARDS  - resolved    Right pneumothorax - resolved   *Initial event was parainfluenza and strep pneumo pneumonia back in November 2022. Treated for ARDS. Had failed extubation x2 and tracheostomy performed on previous hospitalization. *Had additional complications of bilateral pneumothoraces as well as hydropneumothorax- pleural fluid grew candida parapsilosis  *Completed 4-week antifungal treatment. [facility].  *Previously there were concerns for worsening effusion while at [facility] and had thoracentesis 01/13 which returned exudative without growth on cultures. CT chest/abdomen/pelvis on 01/18 demonstrated worsening effusions and concerns for infected parapneumonic effusions with possible SBP.  Multiple pulmonologist at [facility] reviewed CT scan recommended transfer to [facility] for consideration of chest tube placement and possible intrapleural lysis  *Thoracic surgery (Dr. Jackson) consulted during admission   *CT chest 1/22, small effusions on imaging and so chest tube was not inserted.   ID consulted, see note 2/10.  *Patient pulled out his tracheostomy tube on the night 2/1-2/2 and is tolerating well without increased shortness of breath  persistent cough or worsening hypoxia  * Chest x-ray 2/3 with cardiomegaly, chronic small bilateral pleural effusions, no pulmonary edema; right internal jugular dialysis catheter in place  * 3/7/23 CT Chest:  1.  Slight improvement in right pleural effusion and pleural  thickening since the comparison study.  2.  Similar bilateral infiltrates and minimal left effusion.  3.  New wedge-shaped area of decreased attenuation in the spleen,  question a splenic infarct.   * 3/12 Pulmonary consult, see note, concerns of hypercapnia, atelectasis, with consideration of BiPAP trial; no recommendations for antibiotics with infiltrates felt to be atelectasis    Monitor respiratory status, recently stable on room air; occasional cough reported    Encourage incentive spirometry as able    Wound care previously consulted for tracheostomy site care, monitor for signs and symptoms of infection, stable    Splenic infarct  *3/9 CT abdomen:   1. Splenomegaly with a small probable infarct superiorly, infarct is  new since the most recent available abdomen comparison.   2. Small to moderate ascites.  *Hematology consult 3/8 for splenomegaly and question of splenic infarct, Dr. Smith.  Splenomegaly felt secondary to liver disease with suspicion of splenic infarct low given absence of abdominal pain.  Ongoing anticoagulation continued, Eliquis.  See consult note for details.    Monitor, hematology follow-up as needed     S/p liver transplant 2016   Chronic immunosuppression, on tacrolimus  Cirrhosis with ascites, intermittent paracenteses as needed  Subacute bacterial peritonitis (SBP), resolved  *Main manifestations of this are hepatic encephalopathy and ascites.  Hepatologist at [place] felt that most likely reason for the cirrhosis was metabolic syndrome or alcohol intake.  There was no evidence of rejection on biopsy and there was some evidence of regeneration. Paracentesis done on 12/22/2022 showed lactobacillus indicating SBP,  "treated with Unasyn and Augmentin, finished 2-week course 1/12/2023.  Requires large-volume paracentesis periodically for recurring ascites.    *Paracentesis 1/13/2023 yielded about 7500 cc. Cultures NGTD. paracentesis on 1/24 with 4.8 L fluid removal  Paracentesis on 3/6/23 No SBP    Overall stable, past history of ascites with intermittent paracentesis, monitor and reassess daily.  Past history of SBP without abdominal pain or fever.  Monitor.    Further treatment for recurrent ascites with TIPS deferred to his Liver Transplant team and/or Hepatology; has an appointment on 4/21/2023 with Hepatology, Dr. Simms    Continue Tacrolimus 1 mg BID    Continue rifaximin 550 mg BID    Continue lactulose as ordered, titrate    GI consult as needed in hospital for new acute issues, otherwise as outpatient    Encourgage protein diet     Nutrition consult follow-up 3/16, see note, help appreciated     Goals of care   As per prior rounding provider, \"on 1/25 nursing had mentioned that patient had refused to undergo dialysis and want to stop care, palliative care was consulted.  Patient and his spouse denied wanting to stop care and wanted ongoing restorative care including full code\"    Monitor, Full Code status    Needs placement to transitional care unit (TCU) on discharge w/ dialysis     Diarrhea, improved, on lactulose for chronic liver disease  *C difficile negative on 1/31, likely secondary to lactulose.  *Discontinued rectal tube (2/12) as stools more formed    Continue lactulose with goal of 2 to 3 soft bowel movement in 24 hours     Paroxysmal atrial fibrillation  Chronic anticoagulation, apixaban  Recently has remained in sinus rhythm, on anticoagulation with apixaban indefinitely for stroke prophylaxis.      Monitor rhythm    Continue apixaban/Eliquis anticoagulation, monitor for signs/symptoms of bleeding    Monitor hemoglobin, see below     Acute anemia  Pt has required intermittent transfusions for on-going " anemia.  Anemia most likely secondary to critical illness/chronic disease, chronic renal disease.  Baseline hemoglobin around 7-8  Likely Epo resistance    Transfuse packed red blood cells to keep hemoglobin > 7    Epoetin lfa-epbx use as per nephrology    Hemoglobin 8.7 on 3/15, 8.6 3/14, 7.9 3/12, 9.6 on 3/11     Hypercalcemia  Likley hypercalcemia of immobility, previously discussed with nephrology on 3/8/23.  Future use of vitamin D as well as PhosLo as per nephrology.    Calcium 10.3 on 3/13/2023 and 11.1 on 3/14/2023    Hypercalcemia felt to reflect very high bone turnover per nephrology, hypercalcemia with immobilization; nephrology considering antiresorptive therapy if hypercalcemia becomes more severe     History PEA arrest   PEA arrest prior to his previous admission and 1 episode of PEA associated with desaturation on 12/20.  No structural cardiac disease.     Stable, continue cardiac monitoring     Chronic hypotension  In the past has developed baseline hypotension with cirrhosis and prolonged critical illness.  On hemodialysis.  Has received midodrine and albumin during dialysis, as per nephrology    Monitor blood pressure, nephrology to also review/monitor      History of seizure   After hypoxic event, in the context of baseline multifactorial encephalopathy secondary to his ongoing critical illness and prior cardiac arrests and prior strokes and cirrhosis with hepatic encephalopathy. MRI of head of 12/20/22 reveals no PRES or leptomeningeal enhancement but scattered.  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   No recent seizure activity.    Stable, continue levetiracetam and lacosamide     Seizure precautions    Outpatient follow-up with neurology, reconsult inpatient if needed     End-stage renal disease (ESRD) on dialysis  Anemia due to renal disease  Hypotension during dialysis, history of chronic hypotension  *Underwent dialysis  on 2/22/23  and was hypotensive and received midodrine and albumin    As per nephrology    Nephrology reviewing vein mapping to assess options for internal access placement for dialysis, see note 3/16     Diabetes mellitus type 2  *Hemoglobin A1c on November 2022 was 4.7  *By report, on Lantus insulin in the past.  Blood sugar checks and sliding scale insulin previously discontinued as has been stable without insulin needs    Recent blood sugars reviewed, continue to monitor     Severe malnutrition, protein and calorie type  Moderate dysphagia  G-tube feedings  *Video swallow on 2/23, see report    Continue tube feeds via PEG tube as ordered, reassess daily/weekly    Nutrition consulted, ongoing follow-up appreciated; higher protein diet    IR replaced the PEG tube on 2/8/2023, monitor    Speech and language therapy (SLP) consulted in hospital with ongoing follow-up appreciated. Oral diet as per speech and language therapy (SLP)     Continue PPI, Protonix     Anxiety  Fluoxetine was discontinued as per psychiatry recommendation on 2/2 (see consult note for details).  Ongoing concerns of encephalopathy, multifactorial, see above.    Anxiety stable, monitor; comfort and reassurance offered daily    Psychiatry follow-up as needed    Weakness and deconditioning    Ongoing physical therapy (PT), occupational therapy (OT), and speech and language therapy (SLP); remains weak and deconditioned    Transitional care unit (TCU) on future hospital discharge    Disposition     Estimated length of stay 4 to 5 days pending improvement in mental status, need for 1:1 supervision, nephrology recommendations and coordination of dialysis care, and continued clinical improvement    Anticipated discharge to transitional care unit (TCU), skilled care with dialysis services       Diet: Room Service  Snacks/Supplements Adult: Other; Gelatein+ with brkfst and lunch, and magic cup with dinner (RD); With Meals  Adult Formula Bolus Feeding: Novasource  Renal; Route: Gastrostomy; 3 times daily; Volume per Bolus: 240; mL(s); Continue to encourage after meal bolus at 80 mL/hr x 3 hrs, If patient consumes <50% of that meal  Combination Diet Regular Diet; Mildly Thick (level 2) (OK for fresh fruit (e.g., pineapple) per SLP)    DVT Prophylaxis: DOAC  Nixon Catheter: Not present  Lines: PRESENT      CVC Double Lumen Right External jugular Tunneled-Site Assessment: WDL      Cardiac Monitoring: None  Code Status: Full Code      Clinically Significant Risk Factors              # Hypoalbuminemia: Lowest albumin = 1.9 g/dL at 1/23/2023  6:38 AM, will monitor as appropriate            # Severe Malnutrition: based on nutrition assessment        Disposition Plan     Expected Discharge Date: 03/21/2023, 12:00 PM  Discharge Delays: Placement - LTC  Destination: inpatient rehabilitation facility  Discharge Comments: Dialysis pt MWF. Will need placement TCU. [facility] willing to accept once no sitter, SW to follow up when sitter free          Renny Erwin MD  Hospitalist Service  [facility]  Securely message with [email or web address] (more info)  Text page via [facility] Paging/Directory   ______________________________________________________________________    Interval History   Lying in bed, seen and examined in the dialysis unit, appearing comfortable, sleepy, restless sleep overnight reported.  No report of pain.  Calm and cooperative.    Physical Exam   Vital Signs: Temp: 97.8  F (36.6  C) Temp src: Oral BP: 132/84 Pulse: 84   Resp: 28 SpO2: 94 % O2 Device: None (Room air)    Weight: 175 lbs .72 oz    Lying in bed, resting in dialysis, no new complaints, sleepy  Lungs mild to moderate inspiratory effort, no wheezes or crackles  Heart S1-S2, regular rhythm without rubs or gallops  Abdomen soft, nontender without rebound or rigidity; PEG tube remains present  Extremities without significant edema  Mental status quiet, reserved, sleepy this morning    Medical  Decision Making             Data         Imaging results reviewed over the past 24 hrs:   Recent Results (from the past 24 hour(s))   US Upper Extremity Venous Mapping Bilateral    Narrative    US UPPER EXTREMITY VENOUS MAPPING BILATERAL  3/16/2023 3:12 PM     HISTORY: End-stage renal disease. Preoperative evaluation prior to  dialysis access creation.    COMPARISON: None    FINDINGS:   Right upper extremity:  Cephalic vein: The right cephalic vein is patent. Its diameters in the  arm range between 3.8 to 2.0 mm. The vein divides into two branches in  the distal arm. Diameters of a lateral branch range between 4.6 to 1.5  mm. Diameters of a medial branch range between 3.0 to 2.7 mm. The  distal portion of the medial branch is obscured due to IV site  dressing.    The right basilic vein is patent in the arm to proximal forearm. Its  diameters range between 4.7 to 1.9 mm.    Left upper extremity: The left cephalic vein is patent. Its diameters  in the arm range between 2.7 to 1.5 mm. Its diameters in the forearm  range between 2.7 to 1.9 mm.    The left basilic vein is patent in the arm and proximal forearm. Its  diameters range between 6.0 to 3.7 mm.      Impression    IMPRESSION: Vein mapping performed as above. The left basilic vein  appears to be the largest caliber vein.    DANIELA CRUZ MD         SYSTEM ID:  [id number]      Recent Labs   Lab 03/15/23  0935 03/14/23  1033 03/13/23  1454 03/12/23  0756 03/11/23  0846   HGB 8.7* 8.6* 8.6* 7.9* 9.6*

## 2023-03-17 NOTE — PROGRESS NOTES
Renal Medicine Progress Note            Assessment/Plan:     Assessment:  1) ESRD on MWF chronic dialysis via right tunneled CVC    Blood pressure, hemodynamics/BP's appear good. Tolerated 1.5kg UF last two runs, similar goal today.     Will get vascular surgery consult for AVF evaluation with no timeliine on discharge. Frankly, if patient is pullling out CVC, then his may defiine his candidacy for outpatient dialysis.      2) Hypercalcemia, secondary to immobalization. Noted high bone turnover state, suppressed PTH.  Plan for anti-resorptive (denosumab or zoledronic acid) if gets more severe (>12.0 corrected calcium)     3) Anemia:    Hgb 3/14.7.  Getting retacrit 10,000 units most runs (last week 3/6 and 3/8). Adequate iron stores with ferritin 252 and sats 26%.        Plan/Recs:  1) HD today  per MWF schedule  2) EPO with HD  3) UF as tolerated, 1.5Kg uf goal  4) vascular surgery consult placed for internal access    DO [facility]  Office: [phone]  Cell: [phone]        Interval History:     Pt seen on dialysis, no major new events. Blanco asks me about chances of recovery, now into 4th month dialysis, with prior CKD, liittle chances of renal recovery to be dialysis independent. Vein mapping done yesterday.           Medications and Allergies:       - MEDICATION INSTRUCTIONS for Dialysis Patients -   Does not apply See Admin Instructions     sodium chloride 0.9%  250 mL Intravenous Once in dialysis/CRRT     sodium chloride 0.9%  300 mL Hemodialysis Machine Once     apixaban ANTICOAGULANT  5 mg Oral BID     epoetin mirta-epbx  10,000 Units Intravenous Once in dialysis/CRRT     folic acid  1 mg Oral or Feeding Tube Daily     guaiFENesin  600 mg Oral BID     lacosamide  100 mg Oral Q12H     lactulose  10 g Oral BID     levETIRAcetam  1,000 mg Oral Q24H     lidocaine  1 patch Transdermal Q24H     lidocaine   Transdermal Q8H BIJU     midodrine  10 mg Oral or Feeding Tube TID w/meals      mineral oil-hydrophilic petrolatum   Topical Daily     multivitamin RENAL  1 capsule Oral Daily     - MEDICATION INSTRUCTIONS -   Does not apply Once     [Held by provider] nystatin  500,000 Units Swish & Spit 4x Daily     OLANZapine zydis  5 mg Oral BID     pantoprazole  40 mg Oral QAM AC     ramelteon  8 mg Oral QPM     rifaximin  550 mg Oral or Feeding Tube BID     sodium chloride (PF)  3 mL Intracatheter Q8H     tacrolimus  1 mg Oral Q12H      No Known Allergies         Physical Exam:   Vitals were reviewed  /83   Pulse 77   Temp 98.7  F (37.1  C) (Oral)   Resp 29   Wt 79.4 kg (175 lb 0.7 oz)   SpO2 97%   BMI 23.74 kg/m      Wt Readings from Last 3 Encounters:   03/10/23 79.4 kg (175 lb 0.7 oz)   01/21/23 82.4 kg (181 lb 11.2 oz)   12/28/22 96 kg (211 lb 10.3 oz)       Intake/Output Summary (Last 24 hours) at 3/17/2023 0856  Last data filed at 3/16/2023 1400  Gross per 24 hour   Intake 300 ml   Output --   Net 300 ml     GENERAL APPEARANCE: sleepy, arousble  HEENT:  Eyes/ears/nose/neck grossly normal  RESP: lungs clear ant/lat.  CV: RRR, nl S1/S2, no m/r/g   ABDOMEN: o/s/nt/nd, bs present  EXTREMITIES/SKIN: no c/c/rashes/lesions; no edema  LINES: right CVC present, no visible abnormalities              Data:     BMP  Recent Labs   Lab 03/14/23  1033 03/13/23  1454 03/12/23  0756 03/11/23  0846    136 137 136   POTASSIUM 4.8 4.1 4.8 4.3   CHLORIDE 100 99 101 99   KELLY 11.1* 10.3* 11.0* 10.9*   CO2 28 28 25 25   BUN 56.4* 36.2* 61.5* 41.0*   CR 3.96* 3.00* 4.44* 3.45*   * 111* 87 91     CBC  Recent Labs   Lab 03/15/23  0935 03/14/23  1033 03/13/23  1454 03/12/23  0756   WBC 4.3 4.0 3.7* 3.5*   HGB 8.7* 8.6* 8.6* 7.9*   HCT 28.7* 29.1* 28.7* 26.2*   MCV 99 101* 100 100   * 128* 129* 116*     Lab Results   Component Value Date    AST 20 03/14/2023    ALT 8 (L) 03/14/2023    ALKPHOS 177 (H) 03/14/2023    BILITOTAL 0.4 03/14/2023    CHANDLER 64 (H) 03/12/2023     Lab Results   Component  Value Date    INR 1.36 (H) 12/21/2022       Attestation:  I have reviewed today's vital signs, notes, medications, labs and imaging.    DO [facility] - Nephrology  Office: [phone]  Cell: [phone]

## 2023-03-17 NOTE — PLAN OF CARE
SUMMARY: Pleural effusion/ESRD/metabolic encephalopathy     DATE & TIME: 03/16/2023 Night       Cognitive Concerns/ Orientation : Alert to self only-orientation waxes/wanes; some agitation overnight  BEHAVIOR & AGGRESSION TOOL COLOR: Green         ABNL VS/O2: VSS, room air  MOBILITY: Strong assist-2 Justyna Steady/gait belt or lift; able to reposition self in bed  PAIN MANAGMENT: Generalized pain, aching from PT-Tylenol given x1  DIET: Regular + Mildly thickened liquids (level 2) + no straws; if not eating > 50% of meal, then bolus through PEG  BOWEL/BLADDER: Incontinent of stool at times; pt on hemodialysis  ABNL LAB/BG: no new labs  DRAIN/DEVICES: R chest CVC, double lumen for hemodialysis; R PIV saline locked; PEG tube, clamped  SKIN: Scattered bruising and scabs; L elbow dried scab covered with mepilex; L neck tear covered with foam dressing  PROCEDURES: Dialysis today, Friday  D/C DATE: Pending placement to TCU  OTHER IMPORTANT INFO: Hemodialysis MWF

## 2023-03-17 NOTE — PROGRESS NOTES
Video Fluoroscopic Swallow Study (VFSS)     03/17/23 4270   Appointment Info   Signing Clinician's Name / Credentials (SLP) Debbie Ron MS CCC-SLP   General Information   Referring Physician Renny Erwin MD   Patient/Family Therapy Goal Statement (SLP) To get back to thin liquids/ off thickened liquids   Pertinent History of Current Problem See prior SLP notes and Hospitalist's notes   General Observations Pt alert, upright in chair for assessment, reports significant pain in chair (butt/back) and that he is very tired after dialysis. Brother present.   Type of Evaluation   Type of Evaluation Swallow Evaluation   General Swallowing Observations   Swallowing Evaluation Videofluoroscopic swallow study (VFSS)   VFSS Evaluation   Radiologist Dr. Floyd   Views Taken left lateral   Physical Location of Procedure [facility], Radiology Dept, Fluoroscopy Suite   VFSS Textures Trialed thin liquids;solid foods   VFSS Eval: Thin Liquid Texture Trial   Mode of Presentation, Thin Liquid cup;self-fed   Order of Presentation 1, 2, 3, 4 chin tuck, 5 chin tuck, 7 chin tuck, 8   Preparatory Phase poor bolus control   Oral Phase, Thin Liquid premature pharyngeal entry   Bolus Location When Swallow Triggered pyriforms   Pharyngeal Phase, Thin Liquid impaired hyolaryngeal excursion;impaired epiglottic movement;impaired tongue base retraction;residue in vallecula;residue in pyriform sinus  (trace residue in head neutral, mild with chin tuck)   Rosenbek's Penetration Aspiration Scale: Thin Liquid Trial Results 5 - contrast contacts vocal cords, visible residue remains (penetration)   Diagnostic Statement Reduced control, premature bolus spillage, reduced laryngeal closure resulted in before/during laryngeal penetration x6 + aspiration x1 during study. PAS 2 and 5 observed in head neutral positioning, chin tuck effective and reduced to PAS 2 for 3/3 trials. Pt's cough is extremely weak today and ineffective in  clearing glottic or supgraglottic residuals.   VFSS Evaluation: Solid Food Texture Trial   Mode of Presentation, Solid fed by clinician   Order of Presentation 6   Preparatory Phase WFL   Oral Phase, Solid WFL   Bolus Location When Swallow Triggered valleculae   Pharyngeal Phase, Solid impaired hyolaryngel excursion;impaired epiglottic movement;impaired tongue base retraction   Rosenbek's Penetration Aspiration Scale: Solid Food Trial Results 1 - no aspiration, contrast does not enter airway   Diagnostic Statement no significant residuals   Swallowing Recommendations   Diet Consistency Recommendations regular diet;mildly thick liquids (level 2)  (FWP with chin tuck)   Supervision Level for Intake close supervision needed   Mode of Delivery Recommendations bolus size, small;no straws;slow rate of intake   Swallowing Maneuver Recommendations alternate food and liquid intake;supraglottic swallow   Monitoring/Assistance Required (Eating/Swallowing) monitor for cough or change in vocal quality with intake   Recommended Feeding/Eating Techniques (Swallow Eval) maintain upright sitting position for eating;maintain upright posture during/after eating for 30 minutes;minimize distractions during oral intake;moisten oral mucosa prior to intake;provide oral hygiene prior to intake   Clinical Impression   Criteria for Skilled Therapeutic Interventions Met (SLP Eval) Yes, treatment indicated   SLP Diagnosis mild oropharyngeal dysphagia   Risks & Benefits of therapy have been explained evaluation/treatment results reviewed;care plan/treatment goals reviewed;risks/benefits reviewed;current/potential barriers reviewed;participants voiced agreement with care plan;participants included;patient;sibling   Clinical Impression Comments   Video fluoroscopic swallow study completed, with emphasis on thin liquid trials for advancements. Pt assessed with thin liquids (head neutral and chin tuck positioning) and regular solids. He continues to  present with mild oropharyngeal dysphagia - current function appears to be impacted by high level of fatigue during time of assessment (s/p dialysis, did not sleep well last night). Mastication, oral propulsion, oral clearance are functional with regular solids. Reduced oral control with thin liquids resulting in premature bolus spillage the pyriform sinuses; timely swallow initiation at the valleculae with solids. The following components are mild reduced in ROM/function: base of tongue retraction, hyolaryngeal elevaetion/excursion, epiglottic inversion and pharyngoesophageal segment opening during the swallow. No residuals observed with solid trials today, mild residue in valleculae and pyriform sinuses with thin liquids only when using chin tuck manuever.     Laryngeal penetration: Reduced oral control, premature bolus spillage, reduced laryngeal closure resulted in before/during laryngeal penetration x6 + aspiration x1 with thin liquids during study. PAS 2 and 5 observed in head neutral positioning, chin tuck effective and reduced to PAS 2 for 3/3 trials. Pt's cough is extremely weak today and ineffective in clearing glottic or supgraglottic residuals today. No pen/asp with solids.     *PAS reference        SLP Total Evaluation Time   Evaluation, videofluoroscopic eval of swallow function Minutes (38503) 12   SLP Goals   Therapy Frequency (SLP Eval) 5 times/wk   SLP Predicted Duration/Target Date for Goal Attainment 03/24/23   SLP Goals Swallow   SLP: Safely tolerate diet without signs/symptoms of aspiration Regular diet;Thin liquids;With use of swallow precautions;With assistance/supervision   SLP: Goal 2 Pt will complete oropharyngeal exercises x20 + with min cues   SLP Discharge Planning   SLP Plan chin tuck training, exercises   SLP Discharge Recommendation Transitional Care Facility   SLP Rationale for DC Rec Swallow and cognitive function well below baseline requiring ongoing ST. Pt making steady progress  in swallow function.   SLP Brief overview of current status    Continue regular solids and mildly thick liquids - small/single sips, straws ok if one sip at a time, complete upright position, slow rate of intake, liquid wash PRN. Free Water Protocol WITH 1:1  SUPERVISION: THIN water, between meals (30 mins before or after), after oral hygiene, when fully upright, small single sips WITH CHIN TUCK. Defer nutrition/PEG management to RD and MD     Total Session Time   Total Session Time (sum of timed and untimed services) 12

## 2023-03-18 ENCOUNTER — APPOINTMENT (OUTPATIENT)
Dept: SPEECH THERAPY | Facility: CLINIC | Age: 58
DRG: 981 | End: 2023-03-18
Attending: STUDENT IN AN ORGANIZED HEALTH CARE EDUCATION/TRAINING PROGRAM
Payer: COMMERCIAL

## 2023-03-18 ENCOUNTER — APPOINTMENT (OUTPATIENT)
Dept: PHYSICAL THERAPY | Facility: CLINIC | Age: 58
DRG: 981 | End: 2023-03-18
Attending: STUDENT IN AN ORGANIZED HEALTH CARE EDUCATION/TRAINING PROGRAM
Payer: COMMERCIAL

## 2023-03-18 PROCEDURE — 250N000013 HC RX MED GY IP 250 OP 250 PS 637: Performed by: STUDENT IN AN ORGANIZED HEALTH CARE EDUCATION/TRAINING PROGRAM

## 2023-03-18 PROCEDURE — 250N000013 HC RX MED GY IP 250 OP 250 PS 637

## 2023-03-18 PROCEDURE — 250N000013 HC RX MED GY IP 250 OP 250 PS 637: Performed by: INTERNAL MEDICINE

## 2023-03-18 PROCEDURE — 97530 THERAPEUTIC ACTIVITIES: CPT | Mod: GP

## 2023-03-18 PROCEDURE — 92526 ORAL FUNCTION THERAPY: CPT | Mod: GN

## 2023-03-18 PROCEDURE — 120N000001 HC R&B MED SURG/OB

## 2023-03-18 PROCEDURE — 250N000013 HC RX MED GY IP 250 OP 250 PS 637: Performed by: HOSPITALIST

## 2023-03-18 PROCEDURE — 99232 SBSQ HOSP IP/OBS MODERATE 35: CPT | Performed by: HOSPITALIST

## 2023-03-18 PROCEDURE — 250N000012 HC RX MED GY IP 250 OP 636 PS 637: Performed by: STUDENT IN AN ORGANIZED HEALTH CARE EDUCATION/TRAINING PROGRAM

## 2023-03-18 PROCEDURE — 97116 GAIT TRAINING THERAPY: CPT | Mod: GP

## 2023-03-18 RX ADMIN — MIDODRINE HYDROCHLORIDE 10 MG: 5 TABLET ORAL at 10:01

## 2023-03-18 RX ADMIN — ACETAMINOPHEN 650 MG: 325 TABLET, FILM COATED ORAL at 21:28

## 2023-03-18 RX ADMIN — MIDODRINE HYDROCHLORIDE 10 MG: 5 TABLET ORAL at 12:33

## 2023-03-18 RX ADMIN — RIFAXIMIN 550 MG: 550 TABLET ORAL at 10:00

## 2023-03-18 RX ADMIN — RIFAXIMIN 550 MG: 550 TABLET ORAL at 21:21

## 2023-03-18 RX ADMIN — TACROLIMUS 1 MG: 1 CAPSULE ORAL at 10:01

## 2023-03-18 RX ADMIN — LACTULOSE 10 G: 10 SOLUTION ORAL at 21:20

## 2023-03-18 RX ADMIN — MIDODRINE HYDROCHLORIDE 10 MG: 5 TABLET ORAL at 18:25

## 2023-03-18 RX ADMIN — OLANZAPINE 5 MG: 5 TABLET, ORALLY DISINTEGRATING ORAL at 10:01

## 2023-03-18 RX ADMIN — LACTULOSE 10 G: 10 SOLUTION ORAL at 10:02

## 2023-03-18 RX ADMIN — OLANZAPINE 5 MG: 5 TABLET, ORALLY DISINTEGRATING ORAL at 21:21

## 2023-03-18 RX ADMIN — LACOSAMIDE 100 MG: 100 TABLET, FILM COATED ORAL at 01:30

## 2023-03-18 RX ADMIN — GUAIFENESIN 600 MG: 600 TABLET, EXTENDED RELEASE ORAL at 21:21

## 2023-03-18 RX ADMIN — LEVETIRACETAM 1000 MG: 500 TABLET, FILM COATED ORAL at 01:30

## 2023-03-18 RX ADMIN — LACOSAMIDE 100 MG: 100 TABLET, FILM COATED ORAL at 12:33

## 2023-03-18 RX ADMIN — ACETAMINOPHEN 650 MG: 325 TABLET, FILM COATED ORAL at 12:33

## 2023-03-18 RX ADMIN — WHITE PETROLATUM 4 G: 1.75 OINTMENT TOPICAL at 10:19

## 2023-03-18 RX ADMIN — Medication 1 CAPSULE: at 10:00

## 2023-03-18 RX ADMIN — GUAIFENESIN 600 MG: 600 TABLET, EXTENDED RELEASE ORAL at 10:01

## 2023-03-18 RX ADMIN — TACROLIMUS 1 MG: 1 CAPSULE ORAL at 21:21

## 2023-03-18 RX ADMIN — FOLIC ACID 1 MG: 1 TABLET ORAL at 10:01

## 2023-03-18 RX ADMIN — RAMELTEON 8 MG: 8 TABLET, FILM COATED ORAL at 21:21

## 2023-03-18 RX ADMIN — APIXABAN 5 MG: 5 TABLET, FILM COATED ORAL at 10:01

## 2023-03-18 RX ADMIN — PANTOPRAZOLE SODIUM 40 MG: 40 TABLET, DELAYED RELEASE ORAL at 10:01

## 2023-03-18 RX ADMIN — APIXABAN 5 MG: 5 TABLET, FILM COATED ORAL at 21:21

## 2023-03-18 RX ADMIN — LEVETIRACETAM 1000 MG: 500 TABLET, FILM COATED ORAL at 21:21

## 2023-03-18 ASSESSMENT — ACTIVITIES OF DAILY LIVING (ADL)
ADLS_ACUITY_SCORE: 63
ADLS_ACUITY_SCORE: 63
ADLS_ACUITY_SCORE: 55
ADLS_ACUITY_SCORE: 65
ADLS_ACUITY_SCORE: 57
ADLS_ACUITY_SCORE: 61
ADLS_ACUITY_SCORE: 63
ADLS_ACUITY_SCORE: 55
ADLS_ACUITY_SCORE: 63

## 2023-03-18 NOTE — PROGRESS NOTES
Chart reviewed  HD yesterday without issues . 1.5 L UF  On Room air.     Plan to continue MWF HD. Next on MOnday     Maria Fink MD  [facility] - Nephrology   [phone]

## 2023-03-18 NOTE — PROGRESS NOTES
[facility]    Medicine Progress Note - Hospitalist Service    Date of Admission:  1/21/2023    Assessment & Plan   Blanco Osborne is a 57 year-old male admitted on 1/21/2023 as a transfer from [facility] for evaluation of loculated pleural effusion. He has extensive PMH including h/o liver transplant 2016 due to alcohol abuse, pAF on chronic anticoagulation, history of diabetes mellitus type 2, CVA, hypertension, CHRISTIAN on BiPAP.  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 He was recovering in [facility] and was transferred to [facility] for consideration of chest tube placement and possible intrapleural lysis for worsening effusion.     Acute metabolic encephalopathy with delirium, multifactorial  Hepatic encephalopathy  Chronic liver disease, history of liver transplant 2016  End-stage renal disease (ESRD), on hemodialysis (HD)  History of seizure, on Keppra and Vimpat  Waxing and waning mentation, coinciding with lactulose being held at [facility]  Earlier in hospital stay, remained confused and had pulled out tracheostomy tube, PICC line and pulled his dialysis catheter.  Lines replaced.  Palliative care consult 1/26, see note.  * Psychiatry consulted, recent follow-up 2/21, 2/28, see notes.    Medically stable with some fluctuating mental status, overall improving    Complicated, prolonged hospital course with concerns of hepatic encephalopathy, end-stage renal disease on dialysis, past PEA arrest, seizure disorder, and chronic liver disease with prior liver transplant.  Mental status continues to fluctuate requiring one-to-one sitter as needed for added safety and supervision.  Continue to reorient as needed and reassess.  Avoid restraints if possible with goals of safety and close  supervision.  Maintain normal day/night cycles and sleep-wake cycles.  Minimize sedating medications as able.  Remains weak and deconditioned with complex, prolonged hospital course and limited mobility.  Encouragement and support offered daily.    Continue Lactulose to 10 mg BID, monitor for symptoms; goal to have 2-3 bowel movements per day    Ongoing hemodialysis, as per nephrology, 3X/week dialysis schedule    Nephrology consult follow-up appreciated    Continue ramelteon 8 mg at bedtime; monitor for side effects    Continue olanzapine (Zyprexa) 5 mg bid and as needed; monitor for side effects    Seizure disorder stable, continue Keppra and Vimpat; monitor    Reconsult psychiatry as needed     Hypomagnesemia    Recent lab reviewed, continue to monitor    Magnesium replacement as per nephrology, on dialysis 3X per week     Bilateral pleural effusions   Acute respiratory failure requiring tracheostomy, resolved  Pneumonia, resolved  ARDS, resolved  Right pneumothorax, resolved   *Initial event was parainfluenza and strep pneumo pneumonia back in November 2022. Treated for ARDS. Had failed extubation x2 and tracheostomy performed on previous hospitalization. *Had additional complications of bilateral pneumothoraces as well as hydropneumothorax- pleural fluid grew candida parapsilosis  *Completed 4-week antifungal treatment. [facility] from [facility].  *Previously there were concerns for worsening effusion while at [facility] and had thoracentesis 01/13 which returned exudative without growth on cultures. CT chest/abdomen/pelvis on 01/18 demonstrated worsening effusions and concerns for infected parapneumonic effusions with possible SBP.  Multiple pulmonologist at [facility] reviewed CT scan recommended transfer to [facility] for consideration of chest tube placement and possible intrapleural lysis  *Thoracic surgery (Dr. Jackson) consulted during admission   *CT chest 1/22, small effusions  on imaging and so chest tube was not inserted.   ID consulted, see note 2/10.  *Patient pulled out his tracheostomy tube on the night 2/1-2/2 and is tolerating well without increased shortness of breath persistent cough or worsening hypoxia  * Chest x-ray 2/3 with cardiomegaly, chronic small bilateral pleural effusions, no pulmonary edema; right internal jugular dialysis catheter in place  * 3/7/23 CT Chest:  1.  Slight improvement in right pleural effusion and pleural  thickening since the comparison study.  2.  Similar bilateral infiltrates and minimal left effusion.  3.  New wedge-shaped area of decreased attenuation in the spleen,  question a splenic infarct.   * 3/12 Pulmonary consult, see note, concerns of hypercapnia, atelectasis, with consideration of BiPAP trial; no recommendations for antibiotics with infiltrates felt to be atelectasis    Stable, continue to monitor respiratory status, recently stable on room air; occasional cough reported    Encourage incentive spirometry as able    Wound care previously consulted for tracheostomy site care, monitor for signs and symptoms of infection, stable/resolved    Splenic infarct  *3/9 CT abdomen:   1. Splenomegaly with a small probable infarct superiorly, infarct is  new since the most recent available abdomen comparison.   2. Small to moderate ascites.  *Hematology consult 3/8 for splenomegaly and question of splenic infarct, Dr. Smith.  Splenomegaly felt secondary to liver disease with suspicion of splenic infarct low given absence of abdominal pain.  Ongoing anticoagulation continued, Eliquis.  See consult note for details.    Monitor, hematology follow-up as needed, follow-up with primary clinic provider      S/p liver transplant 2016   Chronic immunosuppression, on tacrolimus  Cirrhosis with ascites, intermittent paracenteses as needed  Subacute bacterial peritonitis (SBP), resolved  *Main manifestations of this are hepatic encephalopathy and ascites.  " Hepatologist at [place] felt that most likely reason for the cirrhosis was metabolic syndrome or alcohol intake.  There was no evidence of rejection on biopsy and there was some evidence of regeneration. Paracentesis done on 12/22/2022 showed lactobacillus indicating SBP, treated with Unasyn and Augmentin, finished 2-week course 1/12/2023.  Requires large-volume paracentesis periodically for recurring ascites.    *Paracentesis 1/13/2023 yielded about 7500 cc. Cultures NGTD. paracentesis on 1/24 with 4.8 L fluid removal  Paracentesis on 3/6/23 No SBP    Stable, with past history of ascites with intermittent paracentesis; monitor and reassess daily.  Past history of SBP.    Further treatment for recurrent ascites with TIPS deferred to his liver transplant team and/or Hepatology; has an appointment on 4/21/2023 with Hepatology, Dr. Simms    Continue Tacrolimus 1 mg BID    Continue rifaximin 550 mg BID    Continue lactulose as ordered, titrate to bowel movements    GI consult as needed in hospital for new acute issues, otherwise as outpatient    Encourgage protein diet     Nutrition consult follow-up 3/16, see note, help appreciated     Goals of care   As per prior rounding provider, \"on 1/25 nursing had mentioned that patient had refused to undergo dialysis and want to stop care, palliative care was consulted.  Patient and his spouse denied wanting to stop care and wanted ongoing restorative care including full code\"    Monitor, Full Code status    Needs placement to transitional care unit (TCU) on discharge w/ dialysis     Diarrhea, improved, on lactulose for chronic liver disease, history of liver transplant  *C difficile negative on 1/31, likely secondary to lactulose.  *Discontinued rectal tube (2/12) as stools more formed    Continue lactulose with goal of 2 to 3 soft bowel movement in 24 hours     Paroxysmal atrial fibrillation  Chronic anticoagulation, apixaban  Recently has remained in sinus rhythm, on " anticoagulation with apixaban indefinitely for stroke prophylaxis.      Monitor rhythm    Continue apixaban/Eliquis anticoagulation, monitor for signs/symptoms of bleeding    Monitor hemoglobin, see below     Acute anemia  Pt has required intermittent transfusions for on-going anemia.  Anemia most likely secondary to critical illness/chronic disease, chronic renal disease.  Baseline hemoglobin around 7-8  Likely Epo resistance    Transfuse packed red blood cells to keep hemoglobin > 7    Epoetin lfa-epbx use as per nephrology    Hemoglobin 8.7 on 3/15, 8.6 3/14, 7.9 3/12, 9.6 on 3/11     Hypercalcemia  Likley hypercalcemia of immobility, previously discussed with nephrology on 3/8/23.  Future use of vitamin D as well as PhosLo as per nephrology.    Calcium 10.3 on 3/17/2023, monitor, as per nephrology    Hypercalcemia felt to reflect very high bone turnover per nephrology, hypercalcemia with immobilization; nephrology considering antiresorptive therapy if hypercalcemia becomes more severe     History PEA arrest   PEA arrest prior to his previous admission and 1 episode of PEA associated with desaturation on 12/20.  No structural cardiac disease.     Stable, continue cardiac monitoring     Chronic hypotension  In the past has developed baseline hypotension with cirrhosis and prolonged critical illness.  On hemodialysis.  Has received midodrine and albumin during dialysis, as per nephrology    Recent blood pressure's reviewed, 90 to 100's systolic; nephrology following, dialysis patient      History of seizure disorder   After hypoxic event, in the context of baseline multifactorial encephalopathy secondary to his ongoing critical illness and prior cardiac arrests and prior strokes and cirrhosis with hepatic encephalopathy. MRI of head of 12/20/22 reveals no PRES or leptomeningeal enhancement but scattered.  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   No recent  seizure activity.    Stable, no report of seizures; continue levetiracetam (Keppra) 1000 mg q24h and lacosamide (Vimpat) 100 mg q12h    Seizure precautions    Outpatient follow-up with neurology, reconsult inpatient if needed     End-stage renal disease (ESRD) on dialysis  Anemia due to renal disease  Hypotension during dialysis, history of chronic hypotension  *Underwent dialysis  on 2/22/23 and was hypotensive and received midodrine and albumin    As per nephrology    Nephrology reviewing vein mapping to assess options for internal access placement for dialysis, see note 3/16     Diabetes mellitus type 2  *Hemoglobin A1c on November 2022 was 4.7  *By report, on Lantus insulin in the past.  Blood sugar checks and sliding scale insulin previously discontinued as has been stable without insulin needs    Recent blood sugars reviewed, continue to monitor     Severe malnutrition, protein and calorie type  Moderate dysphagia  G-tube feedings  *Video swallow on 2/23, see report    Continue tube feeds via PEG tube as ordered, reassess daily/weekly    Nutrition consulted, ongoing follow-up appreciated; higher protein diet    IR replaced the PEG tube on 2/8/2023, monitor    Speech and language therapy (SLP) consulted in hospital with ongoing follow-up appreciated. Oral diet as per speech and language therapy (SLP); current diet reviewed with snacks    Continue PPI, Protonix 40 mg po daily     Anxiety  Fluoxetine was discontinued as per psychiatry recommendation on 2/2 (see consult note for details).  Ongoing concerns of encephalopathy, multifactorial, see above.    Anxiety stable, monitor; comfort and reassurance offered daily    Psychiatry follow-up as needed    Weakness and deconditioning    Ongoing physical therapy (PT), occupational therapy (OT), and speech and language therapy (SLP) consults; remains weak and deconditioned    Transitional care unit (TCU) on future hospital discharge    Disposition     Estimated length  of stay 3 to 4 days pending improvement in mental status, need for 1:1 supervision, nephrology recommendations and coordination of dialysis care    Anticipated discharge to transitional care unit (TCU), skilled care with dialysis services; social work consulted, help appreciated    Patient's wife, Ofe, called and updated 3/18; she was appreciative of call and updates       Diet: Room Service  Snacks/Supplements Adult: Other; Gelatein+ with brkfst and lunch, and magic cup with dinner (RD); With Meals  Adult Formula Bolus Feeding: Novasource Renal; Route: Gastrostomy; 3 times daily; Volume per Bolus: 240; mL(s); Continue to encourage after meal bolus at 80 mL/hr x 3 hrs, If patient consumes <50% of that meal  Combination Diet Regular Diet; Mildly Thick (level 2) (OK for fresh fruit (e.g., pineapple) per SLP)    DVT Prophylaxis: DOAC  Nixon Catheter: Not present  Lines: PRESENT      CVC Double Lumen Right External jugular Tunneled-Site Assessment: WDL      Cardiac Monitoring: None  Code Status: Full Code      Clinically Significant Risk Factors           # Hypercalcemia: Highest Ca = 10.3 mg/dL in last 2 days, will monitor as appropriate    # Hypoalbuminemia: Lowest albumin = 1.9 g/dL at 1/23/2023  6:38 AM, will monitor as appropriate            # Severe Malnutrition: based on nutrition assessment        Disposition Plan     Expected Discharge Date: 03/21/2023, 12:00 PM  Discharge Delays: Placement - LTC  Destination: inpatient rehabilitation facility  Discharge Comments: Dialysis pt MWF. Will need placement TCU. [facility] willing to accept once no sitter, SW to follow up when sitter free          Renny Erwin MD  Hospitalist Service  [facility]  Securely message with [employer] (more info)  Text page via [employer] Paging/Directory   ______________________________________________________________________    Interval History   More awake today, lying in bed, seen and examined in his room.   Answering questions and following simple commands though easily falls back asleep.  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  No new complaints today.  No report of pain.    Physical Exam   Vital Signs: Temp: 97.9  F (36.6  C) Temp src: Oral BP: 104/69 Pulse: 100   Resp: 18 SpO2: 95 % O2 Device: None (Room air)    Weight: 175 lbs 4.25 oz    Comfortable, lying in bed, appearing fatigued, no new complaints  Lungs clear  Heart S1-S2 with regular rhythm, no rubs or gallops  Abdomen soft, nontender without rebound or rigidity, PEG tube  Extremities without edema  Mental status quiet, reserved, easily falls asleep, though answers questions and follows commands when asked    Medical Decision Making             Data     I have personally reviewed the following data over the past 24 hrs:    N/A  \   N/A   / N/A     135 (L) 97 (L) 37.6 (H) /  92   4.2 28 2.85 (H) \       ALT: N/A AST: N/A AP: N/A TBILI: N/A   ALB: 3.4 (L) TOT PROTEIN: N/A LIPASE: N/A       Imaging results reviewed over the past 24 hrs:   Recent Results (from the past 24 hour(s))   XR Video Swallow with SLP or OT    Narrative    VIDEO SWALLOWING EVALUATION   3/17/2023 2:31 PM     HISTORY: Dysphagia    COMPARISON: None.    FLUOROSCOPY TIME: 1.2 minutes     Number of cine runs: 7    FINDINGS:    Thin: Penetration to the cords on multiple swallows. One episode of  aspiration with spontaneous cough. No significant residue.    Slightly thick: Not administered.    Mildly thick: Not administered.    Moderately thick: Not administered.    Puree: Not administered.    Semisolid: Not administered.    Regular: Premature spill to the vallecula. Otherwise normal.    This study only includes the cervical esophagus.    ARY COLINDRES MD         SYSTEM ID:  [id number]      Recent Labs   Lab 03/15/23  0935 03/14/23  1033 03/13/23  1454 03/12/23  0756   HGB 8.7* 8.6* 8.6* 7.9*

## 2023-03-18 NOTE — PLAN OF CARE
Goal Outcome Evaluation:  Prolonged hospital course with concerns of hepatic encephalopathy, end-stage renal disease on dialysis, past PEA arrest, seizure disorder, and chronic liver disease with prior liver transplant.    SUMMARY: Pleural effusion/ESRD/metabolic encephalopathy     DATE & TIME: 03/17/23-3/18/23 [id number]       Cognitive Concerns/ Orientation : Alert to self only-orientation   BEHAVIOR & AGGRESSION TOOL COLOR: Green         ABNL VS/O2: VSS, room air  MOBILITY: Strong assist-2 Justyna Steady/gait belt or lift; able to reposition self in bed  PAIN MANAGMENT: Denies pain  DIET: Regular + Mildly thickened liquids (level 2) + no straws; if not eating > 50% of meal, then bolus through PEG  BOWEL/BLADDER: ; pt on hemodialysis  ABNL LAB/BG: no new labs  DRAIN/DEVICES: R chest CVC, double lumen for hemodialysis; R PIV saline locked; PEG tube, clamped  SKIN: Scattered bruising and scabs; L elbow dried scab covered with mepilex; L neck tear covered with foam dressing  PROCEDURES: Dialysis MWF  D/C DATE: Pending placement to TCU  OTHER IMPORTANT INFO: Hemodialysis MWF; Post liver transplant 2016

## 2023-03-18 NOTE — PLAN OF CARE
Goal Outcome Evaluation:    3/18/23 , 7a to 3 p  Prolonged hospital course with concerns of hepatic encephalopathy, end-stage renal disease on dialysis, past PEA arrest, seizure disorder, and chronic liver disease with prior liver transplant.    SUMMARY: Pleural effusion/ESRD/metabolic encephalopathy    Orientation: Alert to self and family    Vitals/Tele: VSS on RA    IV Access/drains: CVC port L chest, L IV SL    Diet: Regular, mildly thickened liquids, can have sips of thin water in between meals with supervision    Mobility: From Justyna steady to A2 GB and W, worked with PT today ambulated 80 ft in hallway    GI/: Dialysis patient at least 3 BMs today soft    Wound/Skin: Skin tear on L neck, scattered bruising    Consults: Nephrology following, CC/SW     Discharge Plan: Pending placement    Refused Lidocaine patch, patch free period

## 2023-03-19 PROCEDURE — 250N000013 HC RX MED GY IP 250 OP 250 PS 637: Performed by: INTERNAL MEDICINE

## 2023-03-19 PROCEDURE — 99232 SBSQ HOSP IP/OBS MODERATE 35: CPT | Performed by: INTERNAL MEDICINE

## 2023-03-19 PROCEDURE — 250N000013 HC RX MED GY IP 250 OP 250 PS 637: Performed by: HOSPITALIST

## 2023-03-19 PROCEDURE — 99232 SBSQ HOSP IP/OBS MODERATE 35: CPT | Mod: 25 | Performed by: HOSPITALIST

## 2023-03-19 PROCEDURE — 250N000013 HC RX MED GY IP 250 OP 250 PS 637

## 2023-03-19 PROCEDURE — 120N000001 HC R&B MED SURG/OB

## 2023-03-19 PROCEDURE — 250N000013 HC RX MED GY IP 250 OP 250 PS 637: Performed by: STUDENT IN AN ORGANIZED HEALTH CARE EDUCATION/TRAINING PROGRAM

## 2023-03-19 PROCEDURE — 250N000012 HC RX MED GY IP 250 OP 636 PS 637: Performed by: STUDENT IN AN ORGANIZED HEALTH CARE EDUCATION/TRAINING PROGRAM

## 2023-03-19 RX ADMIN — OLANZAPINE 5 MG: 5 TABLET, ORALLY DISINTEGRATING ORAL at 21:14

## 2023-03-19 RX ADMIN — RAMELTEON 8 MG: 8 TABLET, FILM COATED ORAL at 19:53

## 2023-03-19 RX ADMIN — WHITE PETROLATUM: 1.75 OINTMENT TOPICAL at 09:50

## 2023-03-19 RX ADMIN — FOLIC ACID 1 MG: 1 TABLET ORAL at 09:48

## 2023-03-19 RX ADMIN — PANTOPRAZOLE SODIUM 40 MG: 40 TABLET, DELAYED RELEASE ORAL at 09:48

## 2023-03-19 RX ADMIN — TACROLIMUS 1 MG: 1 CAPSULE ORAL at 21:15

## 2023-03-19 RX ADMIN — RIFAXIMIN 550 MG: 550 TABLET ORAL at 21:14

## 2023-03-19 RX ADMIN — LACOSAMIDE 100 MG: 100 TABLET, FILM COATED ORAL at 00:14

## 2023-03-19 RX ADMIN — APIXABAN 5 MG: 5 TABLET, FILM COATED ORAL at 21:14

## 2023-03-19 RX ADMIN — RIFAXIMIN 550 MG: 550 TABLET ORAL at 09:49

## 2023-03-19 RX ADMIN — LACTULOSE 10 G: 10 SOLUTION ORAL at 10:02

## 2023-03-19 RX ADMIN — MIDODRINE HYDROCHLORIDE 10 MG: 5 TABLET ORAL at 12:45

## 2023-03-19 RX ADMIN — TACROLIMUS 1 MG: 1 CAPSULE ORAL at 09:49

## 2023-03-19 RX ADMIN — APIXABAN 5 MG: 5 TABLET, FILM COATED ORAL at 09:49

## 2023-03-19 RX ADMIN — OLANZAPINE 5 MG: 5 TABLET, ORALLY DISINTEGRATING ORAL at 09:49

## 2023-03-19 RX ADMIN — ACETAMINOPHEN 650 MG: 325 TABLET, FILM COATED ORAL at 12:44

## 2023-03-19 RX ADMIN — Medication 1 CAPSULE: at 09:48

## 2023-03-19 RX ADMIN — ACETAMINOPHEN 650 MG: 325 TABLET, FILM COATED ORAL at 19:53

## 2023-03-19 RX ADMIN — MIDODRINE HYDROCHLORIDE 10 MG: 5 TABLET ORAL at 09:49

## 2023-03-19 RX ADMIN — LACOSAMIDE 100 MG: 100 TABLET, FILM COATED ORAL at 12:44

## 2023-03-19 RX ADMIN — GUAIFENESIN 600 MG: 600 TABLET, EXTENDED RELEASE ORAL at 21:15

## 2023-03-19 RX ADMIN — MIDODRINE HYDROCHLORIDE 10 MG: 5 TABLET ORAL at 17:54

## 2023-03-19 RX ADMIN — LEVETIRACETAM 1000 MG: 500 TABLET, FILM COATED ORAL at 21:14

## 2023-03-19 RX ADMIN — GUAIFENESIN 600 MG: 600 TABLET, EXTENDED RELEASE ORAL at 09:49

## 2023-03-19 ASSESSMENT — ACTIVITIES OF DAILY LIVING (ADL)
ADLS_ACUITY_SCORE: 58
ADLS_ACUITY_SCORE: 59
ADLS_ACUITY_SCORE: 55
ADLS_ACUITY_SCORE: 54
ADLS_ACUITY_SCORE: 55
ADLS_ACUITY_SCORE: 55
ADLS_ACUITY_SCORE: 54
ADLS_ACUITY_SCORE: 58
ADLS_ACUITY_SCORE: 55

## 2023-03-19 NOTE — PLAN OF CARE
DATE & TIME: 3/18/23 [id number]     Cognitive Concerns/ Orientation : A&Ox3  BEHAVIOR & AGGRESSION TOOL COLOR: Green         ABNL VS/O2: VSS on RA  MOBILITY: Up to chair and commode A1 GB/W this shift. Pt became tired at night and required A2 GB/W to get back to bed.  PAIN MANAGMENT: Denies pain  DIET: Regular + Mildly thickened liquids (level 2) + no straws. Pt ate well this shift- 75%  BOWEL/BLADDER: continent of bowel- up to BSC, BM x2. pt on hemodialysis  ABNL LAB/BG: no new labs  DRAIN/DEVICES: R chest CVC, double lumen for hemodialysis; R PIV saline locked; PEG tube, clamped  SKIN: Scattered bruising and scabs; L elbow dried scab covered with mepilex; L neck tear covered with foam dressing  PROCEDURES: Nothing today  D/C DATE: Pending placement to TCU  OTHER IMPORTANT INFO: Hemodialysis MWF; Chair cushion acquired- pt up in chair for a few hours this shift. Pt doing extremely well today with behavior and mobility. Family at bedside at beginning of the shift.

## 2023-03-19 NOTE — PROGRESS NOTES
[facility]    Medicine Progress Note - Hospitalist Service    Date of Admission:  1/21/2023    Assessment & Plan   Blanco Osborne is a 57 year-old male admitted on 1/21/2023 as a transfer from [facility] for evaluation of loculated pleural effusion. He has extensive PMH including h/o liver transplant 2016 due to alcohol abuse, chronic immunosuppression, ascites and prior spontaneous bacterial peritonitis (SBP), paroxysmal atrial fibrillation (PAF) on chronic anticoagulation (apixaban), history of diabetes mellitus type 2, cerebrovascular disease, past PEA arrest, seizure disorder, hypertension, end-stage renal disease (ESRD) on hemodialysis, malnutrition, anxiety, and obstructive sleep apnea (CHRISTIAN).  In 11/2022, respiratory arrest and diagnosed with parainfluenza and Strep pneumoniae and acute on chronic renal insufficiency, resulting in ESRD, needing dialysis initiation.  Also found to have cirrhosis of transplanted liver.  Previously recovering in [facility] facility and was transferred to [facility] for consideration of chest tube placement and possible intrapleural lysis for worsening effusion.    Date of admission 1/21/23.    Length of hospital stay 57 days.     Acute metabolic encephalopathy with delirium, multifactorial  Hepatic encephalopathy  Chronic liver disease, history of liver transplant 2016  End-stage renal disease (ESRD), on hemodialysis (HD)  History of seizure, on Keppra and Vimpat  Waxing and waning mentation, coinciding with lactulose being held at [facility]  Earlier in hospital stay, remained confused and had pulled out tracheostomy tube, PICC line and pulled his dialysis catheter.  Lines replaced.  Palliative care consult 1/26, see note.  * Psychiatry consulted, recent follow-up 2/21, 2/28, see notes.    Mental status continues to fluctuate with periods of alertness and increased sleepiness, overall improving    Complicated, prolonged hospital course with mental  status concerns multifactorial related to hepatic encephalopathy, end-stage renal disease on dialysis, past PEA arrest, seizure disorder, medication, and chronic liver disease with prior liver transplant.  As mental status fluctuates will continue to require one-to-one sitter as needed for added safety and supervision.  Reassess daily.    Continue to reorient and redirect as able.  Avoid restraints if possible with goals of safety and close supervision (given multiple lines and tubes - dialysis catheter, PEG tube, IV, etc).    Maintain normal day/night cycles and sleep-wake cycles.  Minimize sedating medications as able.  Remains weak and deconditioned with complex, prolonged hospital course and limited mobility.  Encouragement and support offered daily to patient.    Continue Lactulose to 10 mg BID, monitor for symptoms; goal to have 2-3 bowel movements per day    Ongoing hemodialysis, as per nephrology, 3X/week dialysis schedule    Nephrology consult follow-up appreciated, dialysis patient    Continue ramelteon 8 mg at bedtime; monitor for side effects including AM sedation, reassess daily    Continue olanzapine (Zyprexa) 5 mg bid and as needed; monitor for side effects including sedation    Seizure disorder stable, continue Keppra and Vimpat; monitor, no recurrent seizures reported of recent    Reconsult psychiatry as needed     Hypomagnesemia    Recent labs reviewed, continue to monitor    Magnesium replacement as per nephrology, on dialysis 3X per week     Bilateral pleural effusions   Acute respiratory failure requiring tracheostomy, resolved  Pneumonia, resolved  ARDS, resolved  Right pneumothorax, resolved   *Initial event was parainfluenza and strep pneumo pneumonia back in November 2022. Treated for ARDS. Had failed extubation x2 and tracheostomy performed on previous hospitalization. *Had additional complications of bilateral pneumothoraces as well as hydropneumothorax- pleural fluid grew candida  parapsilosis  *Completed 4-week antifungal treatment. While in LTACH he was successfully weaned from the ventilator.  *Previously there were concerns for worsening effusion while at [facility] and had thoracentesis 01/13 which returned exudative without growth on cultures. CT chest/abdomen/pelvis on 01/18 demonstrated worsening effusions and concerns for infected parapneumonic effusions with possible SBP.  Multiple pulmonologist at [facility] reviewed CT scan recommended transfer to [facility] for consideration of chest tube placement and possible intrapleural lysis  *Thoracic surgery (Dr. Jackson) consulted during admission   *CT chest 1/22, small effusions on imaging and so chest tube was not inserted.   ID consulted, see note 2/10.  *Patient pulled out his tracheostomy tube on the night 2/1-2/2 and is tolerating well without increased shortness of breath persistent cough or worsening hypoxia  * Chest x-ray 2/3 with cardiomegaly, chronic small bilateral pleural effusions, no pulmonary edema; right internal jugular dialysis catheter in place  * 3/7/23 CT Chest:  1.  Slight improvement in right pleural effusion and pleural  thickening since the comparison study.  2.  Similar bilateral infiltrates and minimal left effusion.  3.  New wedge-shaped area of decreased attenuation in the spleen,  question a splenic infarct.   * 3/12 Pulmonary consult, see note, concerns of hypercapnia, atelectasis, with consideration of BiPAP trial; no recommendations for antibiotics with infiltrates felt to be atelectasis    Stable, continue to monitor respiratory status, recently stable on room air; occasional cough reported    Encourage incentive spirometry as able, up to chair, deep breathing    Wound care previously consulted for tracheostomy site care, monitor for signs and symptoms of infection, healing well - resolved.    Splenic infarct  *3/9 CT abdomen:   1. Splenomegaly with a small probable infarct superiorly, infarct is  new since the  most recent available abdomen comparison.   2. Small to moderate ascites.  *Hematology consult 3/8 for splenomegaly and question of splenic infarct, Dr. Smith.  Splenomegaly felt secondary to liver disease with suspicion of splenic infarct low given absence of abdominal pain.  Ongoing anticoagulation continued, Eliquis.  See consult note for details.    Monitor, hematology follow-up as needed (see past consult note 3/8), follow-up with primary clinic provider      S/p liver transplant 2016   Chronic immunosuppression, on tacrolimus  Cirrhosis with ascites, intermittent paracenteses as needed  Subacute bacterial peritonitis (SBP), resolved  *Main manifestations of this are hepatic encephalopathy and ascites.  Hepatologist at [place] felt that most likely reason for the cirrhosis was metabolic syndrome or alcohol intake.  There was no evidence of rejection on biopsy and there was some evidence of regeneration. Paracentesis done on 12/22/2022 showed lactobacillus indicating SBP, treated with Unasyn and Augmentin, finished 2-week course 1/12/2023.  Requires large-volume paracentesis periodically for recurring ascites.    *Paracentesis 1/13/2023 yielded about 7500 cc. Cultures NGTD. paracentesis on 1/24 with 4.8 L fluid removal  Paracentesis on 3/6/23 No SBP    Stable, with past history of ascites with intermittent paracentesis; monitor and reassess daily.  Past history of SBP.  If abdominal pain, fever, or leukocytosis with worsening ascites, readdress paracentesis and need to rule out recurrent SBP.    Further treatment for recurrent ascites with TIPS deferred to his liver transplant team and/or Hepatology; has an appointment on 4/21/2023 with Hepatology, Dr. Simms    Continue Tacrolimus 1 mg BID    Continue rifaximin 550 mg BID    Continue lactulose as ordered, titrate to bowel movements    GI consult as needed in hospital for new acute issues, otherwise as outpatient    Encourgage protein diet     Nutrition  "consulted, ongoing follow-up appreciated, recent improvement in oral intake     Goals of care   As per prior rounding provider, \"on 1/25 nursing had mentioned that patient had refused to undergo dialysis and want to stop care, palliative care was consulted.  Patient and his spouse denied wanting to stop care and wanted ongoing restorative care including full code\"    Monitor, Full Code status    Needs placement to transitional care unit (TCU) on discharge w/ dialysis     Diarrhea, improved, on lactulose for chronic liver disease, history of liver transplant  *C difficile negative on 1/31, likely secondary to lactulose.  *Discontinued rectal tube (2/12) as stools more formed    Continue lactulose with goal of 2 to 3 soft bowel movement in 24 hours     Paroxysmal atrial fibrillation  Chronic anticoagulation, apixaban  Recently has remained in sinus rhythm, on anticoagulation with apixaban indefinitely for stroke prophylaxis.      Monitor rhythm    Continue apixaban/Eliquis anticoagulation, monitor for signs/symptoms of bleeding    Monitor hemoglobin, see below     Acute anemia  Pt has required intermittent transfusions for on-going anemia.  Anemia most likely secondary to critical illness/chronic disease, chronic renal disease.  Baseline hemoglobin around 7-8  Likely Epo resistance    Transfuse packed red blood cells to keep hemoglobin > 7    Epoetin lfa-epbx use as per nephrology    Hemoglobin 8.7 on 3/15, 8.6 3/14, 7.9 3/12, 9.6 on 3/11     Hypercalcemia  Likley hypercalcemia of immobility, previously discussed with nephrology on 3/8/23.  Future use of vitamin D as well as PhosLo as per nephrology.    Calcium 10.3 on 3/17/2023, monitor, as per nephrology    Hypercalcemia felt to reflect very high bone turnover per nephrology, hypercalcemia with immobilization; nephrology considering antiresorptive therapy if hypercalcemia becomes more severe     History PEA arrest   PEA arrest prior to his previous admission and 1 " episode of PEA associated with desaturation on 12/20.  No structural cardiac disease.     Stable, continue cardiac monitoring     Chronic hypotension  In the past has developed baseline hypotension with cirrhosis and prolonged critical illness.  On hemodialysis.  Has received midodrine and albumin during dialysis, as per nephrology    Recent blood pressure's reviewed, 100's to 130's systolic; nephrology also following, dialysis patient    History of seizure disorder   After hypoxic event, in the context of baseline multifactorial encephalopathy secondary to his ongoing critical illness and prior cardiac arrests and prior strokes and cirrhosis with hepatic encephalopathy. MRI of head of 12/20/22 reveals no PRES or leptomeningeal enhancement but scattered.  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   No recent seizure activity.    Stable, no report of seizures; continue levetiracetam (Keppra) 1000 mg q24h and lacosamide (Vimpat) 100 mg q12h    Seizure precautions    Outpatient follow-up with neurology, reconsult inpatient if needed     End-stage renal disease (ESRD) on dialysis  Anemia due to renal disease  Hypotension during dialysis, history of chronic hypotension  *Underwent dialysis  on 2/22/23 and was hypotensive and received midodrine and albumin    As per nephrology    Nephrology reviewing vein mapping to assess options for internal access placement for dialysis, see note 3/16     Diabetes mellitus type 2  *Hemoglobin A1c on November 2022 was 4.7  *By report, on Lantus insulin in the past.  Blood sugar checks and sliding scale insulin previously discontinued as has been stable without insulin needs    Recent blood sugars reviewed, continue to monitor     Severe malnutrition, protein and calorie type  History of of moderate dysphagia  History of of G-tube feedings  *Video swallow on 2/23, see report    Ttube feeds via PEG tube as needed; reassess daily; flush  percutaneous endoscopic gastrostomy (PEG) tube to maintain patency    Nutrition consulted, ongoing follow-up appreciated; higher protein diet    IR replaced the PEG tube on 2/8/2023, monitor, keep in place for now    Speech and language therapy (SLP) consulted in hospital with ongoing follow-up appreciated. Oral diet as per speech and language therapy (SLP); current diet reviewed with snacks    Continue PPI, Protonix 40 mg po daily     Anxiety  Fluoxetine was discontinued as per psychiatry recommendation on 2/2 (see consult note for details).  Ongoing concerns of encephalopathy, multifactorial, see above.    Anxiety stable, monitor; comfort and reassurance offered daily    Psychiatry follow-up as needed    Weakness and deconditioning    Ongoing physical therapy (PT), occupational therapy (OT), and speech and language therapy (SLP) consults; remains weak and deconditioned, overall improving    Transitional care unit (TCU) on future hospital discharge    Disposition     Estimated length of stay 2 to 3 days    Anticipated discharge to transitional care unit (TCU) with dialysis services; social work consulted, help appreciated, await follow-up     Patient's wife, Ofe, called and updated 3/18, appreciative of calls and updates    Change in hospitalist provider tomorrow with one of my [facility] hospitalist colleagues assuming care.       Diet: Room Service  Snacks/Supplements Adult: Other; Gelatein+ with brkfst and lunch, and magic cup with dinner (RD); With Meals  Adult Formula Bolus Feeding: Novasource Renal; Route: Gastrostomy; 3 times daily; Volume per Bolus: 240; mL(s); Continue to encourage after meal bolus at 80 mL/hr x 3 hrs, If patient consumes <50% of that meal  Combination Diet Regular Diet; Mildly Thick (level 2) (OK for fresh fruit (e.g., pineapple) per SLP)    DVT Prophylaxis: DOAC  Nixon Catheter: Not present  Lines: PRESENT      CVC Double Lumen Right External jugular Tunneled-Site Assessment:  WDL except      Cardiac Monitoring: None  Code Status: Full Code      Clinically Significant Risk Factors           # Hypercalcemia: Highest Ca = 10.3 mg/dL in last 2 days, will monitor as appropriate    # Hypoalbuminemia: Lowest albumin = 1.9 g/dL at 1/23/2023  6:38 AM, will monitor as appropriate            # Severe Malnutrition: based on nutrition assessment        Disposition Plan     Expected Discharge Date: 03/21/2023, 12:00 PM  Discharge Delays: Placement - LTC  Destination: inpatient rehabilitation facility  Discharge Comments: Dialysis pt MWF. Will need placement TCU. [facility] willing to accept once no sitter, SW to follow up when sitter free          Renny Erwin MD  Hospitalist Service  [facility]  Securely message with [employer] (more info)  Text page via [facility] Paging/Directory   ______________________________________________________________________    Interval History   Lying in bed, pleasant and interactive though appearing sleepy, denying new complaints.  No abdominal pain, nausea, or vomiting.  Reports restless sleep overnight.  Nursing staff also at the bedside.      Physical Exam   Vital Signs: Temp: 97.4  F (36.3  C) Temp src: Oral BP: 102/64 Pulse: 73   Resp: 18 SpO2: 96 % O2 Device: None (Room air)    Weight: 175 lbs 4.25 oz    Calm, comfortable, lying in bed, initially napping, able to answer simple questions and following commands  Lungs clear with good inspiratory effort, diminished breath sounds lower lung fields, no wheezes  Heart S1-S2 with regular rhythm, no rubs or gallops  Abdomen soft, nontender without rebound or rigidity  Extremities without edema  Mental status quiet, reserved, initially sleeping, fatigued overall though answering questions and following commands appropriately    Medical Decision Making             Data         Imaging results reviewed over the past 24 hrs:   No results found for this or any previous visit (from the past 24 hour(s)).    Recent Labs   Lab 03/15/23  0935 03/14/23  1033 03/13/23  1454   HGB 8.7* 8.6* 8.6*     Recent Labs   Lab Test 03/17/23  1308 03/14/23  1033   * 138   POTASSIUM 4.2 4.8   CHLORIDE 97* 100   CO2 28 28   ANIONGAP 10 10   GLC 92 116*   BUN 37.6* 56.4*   CR 2.85* 3.96*   KELLY 10.3* 11.1*

## 2023-03-19 NOTE — PLAN OF CARE
Orientation A&O to self only, forgetful. Redirectable this shift.     Vitals/Tele VSS RA    IV Access/drains PIV    Diet Regular; mild thick level 2     Mobility A2 sera steady    GI/: Anuria, on HD. Up to BSC x2 this shift - small BM.    Wound/Skin Jaundice, yellow sclera. Scattered bruising, dry flaky heels/feet. Gtube WNL, clamped. Blanchable redness to coccyx, jennifer.     Consults Nephrology following for HD; SW coordinating TCU w/dialysis placement.     Discharge Plan Pending TCU placement      See Flow sheets for assessment

## 2023-03-19 NOTE — PROGRESS NOTES
Renal Medicine Progress Note            Assessment/Plan:     Assessment:  1) ESRD on MWF chronic dialysis via right tunneled CVC    Blood pressure, hemodynamics/BP's appear good. Tolerated 1.5kg UF last fewruns, similar goal tomorrow    Seen by vasc surgery . Noted plans for Lt Arm fistula this week.      2) Hypercalcemia, secondary to immobalization. Noted high bone turnover state, suppressed PTH.  Plan for anti-resorptive (denosumab or zoledronic acid) if gets more severe (>12.0 corrected calcium)     3) Anemia:    Hgb 8.7.  Getting retacrit 10,000 units most runs . Adequate iron stores with ferritin 252 and sats 26%.        Plan/Recs:  1) HD today  per MWF schedule  2) EPO with HD  3) UF as tolerated, 1.5Kg uf goal      Maria Fink MD  [facility] - Nephrology   [phone]          Interval History:      No acute issues overnight.   Afebrile. Seems confused at times. Asking same questions repeatedly   Vitals stable.           Medications and Allergies:       - MEDICATION INSTRUCTIONS for Dialysis Patients -   Does not apply See Admin Instructions     apixaban ANTICOAGULANT  5 mg Oral BID     folic acid  1 mg Oral or Feeding Tube Daily     guaiFENesin  600 mg Oral BID     lacosamide  100 mg Oral Q12H     lactulose  10 g Oral BID     levETIRAcetam  1,000 mg Oral Q24H     lidocaine  1 patch Transdermal Q24H     lidocaine   Transdermal Q8H BIJU     midodrine  10 mg Oral or Feeding Tube TID w/meals     mineral oil-hydrophilic petrolatum   Topical Daily     multivitamin RENAL  1 capsule Oral Daily     [Held by provider] nystatin  500,000 Units Swish & Spit 4x Daily     OLANZapine zydis  5 mg Oral BID     pantoprazole  40 mg Oral QAM AC     ramelteon  8 mg Oral QPM     rifaximin  550 mg Oral or Feeding Tube BID     sodium chloride (PF)  3 mL Intracatheter Q8H     tacrolimus  1 mg Oral Q12H      No Known Allergies         Physical Exam:   Vitals were reviewed  /64 (BP Location: Left arm)   Pulse 73    Temp 97.4  F (36.3  C) (Oral)   Resp 18   Wt 79.5 kg (175 lb 4.3 oz)   SpO2 96%   BMI 23.77 kg/m      Wt Readings from Last 3 Encounters:   03/18/23 79.5 kg (175 lb 4.3 oz)   01/21/23 82.4 kg (181 lb 11.2 oz)   12/28/22 96 kg (211 lb 10.3 oz)       GENERAL APPEARANCE:awake   RESP: lungs clear ant/lat.  CV: RRR, nl S1/S2, no m/r/g   ABDOMEN: o/s/nt/nd, bs present  EXTREMITIES/SKIN: no c/c/rashes/lesions; no edema  LINES: right CVC present, no visible abnormalities              Data:     BMP  Recent Labs   Lab 03/17/23  1308 03/14/23  1033 03/13/23  1454   * 138 136   POTASSIUM 4.2 4.8 4.1   CHLORIDE 97* 100 99   KELLY 10.3* 11.1* 10.3*   CO2 28 28 28   BUN 37.6* 56.4* 36.2*   CR 2.85* 3.96* 3.00*   GLC 92 116* 111*     CBC  Recent Labs   Lab 03/15/23  0935 03/14/23  1033 03/13/23  1454   WBC 4.3 4.0 3.7*   HGB 8.7* 8.6* 8.6*   HCT 28.7* 29.1* 28.7*   MCV 99 101* 100   * 128* 129*     Lab Results   Component Value Date    AST 20 03/14/2023    ALT 8 (L) 03/14/2023    ALKPHOS 177 (H) 03/14/2023    BILITOTAL 0.4 03/14/2023    CHANDLER 64 (H) 03/12/2023     Lab Results   Component Value Date    INR 1.36 (H) 12/21/2022       Attestation:  I have reviewed today's vital signs, notes, medications, labs and imaging.    Maria Fink MD  [facility] - Nephrology

## 2023-03-20 ENCOUNTER — APPOINTMENT (OUTPATIENT)
Dept: SPEECH THERAPY | Facility: CLINIC | Age: 58
DRG: 981 | End: 2023-03-20
Attending: STUDENT IN AN ORGANIZED HEALTH CARE EDUCATION/TRAINING PROGRAM
Payer: COMMERCIAL

## 2023-03-20 LAB
MAGNESIUM SERPL-MCNC: 1.6 MG/DL (ref 1.7–2.3)
PHOSPHATE SERPL-MCNC: 3.5 MG/DL (ref 2.5–4.5)

## 2023-03-20 PROCEDURE — 250N000013 HC RX MED GY IP 250 OP 250 PS 637: Performed by: STUDENT IN AN ORGANIZED HEALTH CARE EDUCATION/TRAINING PROGRAM

## 2023-03-20 PROCEDURE — 634N000001 HC RX 634: Performed by: INTERNAL MEDICINE

## 2023-03-20 PROCEDURE — 92526 ORAL FUNCTION THERAPY: CPT | Mod: GN

## 2023-03-20 PROCEDURE — 99232 SBSQ HOSP IP/OBS MODERATE 35: CPT | Performed by: STUDENT IN AN ORGANIZED HEALTH CARE EDUCATION/TRAINING PROGRAM

## 2023-03-20 PROCEDURE — 250N000013 HC RX MED GY IP 250 OP 250 PS 637: Performed by: INTERNAL MEDICINE

## 2023-03-20 PROCEDURE — 83735 ASSAY OF MAGNESIUM: CPT | Performed by: STUDENT IN AN ORGANIZED HEALTH CARE EDUCATION/TRAINING PROGRAM

## 2023-03-20 PROCEDURE — 120N000001 HC R&B MED SURG/OB

## 2023-03-20 PROCEDURE — 36415 COLL VENOUS BLD VENIPUNCTURE: CPT | Performed by: STUDENT IN AN ORGANIZED HEALTH CARE EDUCATION/TRAINING PROGRAM

## 2023-03-20 PROCEDURE — 250N000013 HC RX MED GY IP 250 OP 250 PS 637

## 2023-03-20 PROCEDURE — 250N000013 HC RX MED GY IP 250 OP 250 PS 637: Performed by: HOSPITALIST

## 2023-03-20 PROCEDURE — 84100 ASSAY OF PHOSPHORUS: CPT | Performed by: STUDENT IN AN ORGANIZED HEALTH CARE EDUCATION/TRAINING PROGRAM

## 2023-03-20 PROCEDURE — 250N000012 HC RX MED GY IP 250 OP 636 PS 637: Performed by: STUDENT IN AN ORGANIZED HEALTH CARE EDUCATION/TRAINING PROGRAM

## 2023-03-20 PROCEDURE — 90935 HEMODIALYSIS ONE EVALUATION: CPT | Performed by: STUDENT IN AN ORGANIZED HEALTH CARE EDUCATION/TRAINING PROGRAM

## 2023-03-20 PROCEDURE — 258N000003 HC RX IP 258 OP 636: Performed by: INTERNAL MEDICINE

## 2023-03-20 PROCEDURE — 90937 HEMODIALYSIS REPEATED EVAL: CPT

## 2023-03-20 PROCEDURE — 99231 SBSQ HOSP IP/OBS SF/LOW 25: CPT

## 2023-03-20 RX ADMIN — TACROLIMUS 1 MG: 1 CAPSULE ORAL at 11:35

## 2023-03-20 RX ADMIN — ACETAMINOPHEN 650 MG: 325 TABLET, FILM COATED ORAL at 15:37

## 2023-03-20 RX ADMIN — OLANZAPINE 5 MG: 5 TABLET, ORALLY DISINTEGRATING ORAL at 11:35

## 2023-03-20 RX ADMIN — LACOSAMIDE 100 MG: 100 TABLET, FILM COATED ORAL at 01:21

## 2023-03-20 RX ADMIN — WHITE PETROLATUM: 1.75 OINTMENT TOPICAL at 12:16

## 2023-03-20 RX ADMIN — GUAIFENESIN 600 MG: 600 TABLET, EXTENDED RELEASE ORAL at 11:35

## 2023-03-20 RX ADMIN — ACETAMINOPHEN 650 MG: 325 TABLET, FILM COATED ORAL at 11:42

## 2023-03-20 RX ADMIN — SODIUM CHLORIDE 250 ML: 9 INJECTION, SOLUTION INTRAVENOUS at 11:29

## 2023-03-20 RX ADMIN — MIDODRINE HYDROCHLORIDE 10 MG: 5 TABLET ORAL at 11:34

## 2023-03-20 RX ADMIN — EPOETIN ALFA-EPBX 10000 UNITS: 10000 INJECTION, SOLUTION INTRAVENOUS; SUBCUTANEOUS at 09:50

## 2023-03-20 RX ADMIN — RIFAXIMIN 550 MG: 550 TABLET ORAL at 11:38

## 2023-03-20 RX ADMIN — LACTULOSE 10 G: 10 SOLUTION ORAL at 11:39

## 2023-03-20 RX ADMIN — FOLIC ACID 1 MG: 1 TABLET ORAL at 11:34

## 2023-03-20 RX ADMIN — APIXABAN 5 MG: 5 TABLET, FILM COATED ORAL at 11:35

## 2023-03-20 RX ADMIN — MIDODRINE HYDROCHLORIDE 10 MG: 5 TABLET ORAL at 08:00

## 2023-03-20 RX ADMIN — PANTOPRAZOLE SODIUM 40 MG: 40 TABLET, DELAYED RELEASE ORAL at 11:35

## 2023-03-20 RX ADMIN — LACOSAMIDE 100 MG: 100 TABLET, FILM COATED ORAL at 11:34

## 2023-03-20 RX ADMIN — MIDODRINE HYDROCHLORIDE 10 MG: 5 TABLET ORAL at 17:50

## 2023-03-20 RX ADMIN — Medication 1 CAPSULE: at 11:36

## 2023-03-20 RX ADMIN — SODIUM CHLORIDE 300 ML: 9 INJECTION, SOLUTION INTRAVENOUS at 09:50

## 2023-03-20 ASSESSMENT — ACTIVITIES OF DAILY LIVING (ADL)
ADLS_ACUITY_SCORE: 55
ADLS_ACUITY_SCORE: 59
ADLS_ACUITY_SCORE: 59
ADLS_ACUITY_SCORE: 55
ADLS_ACUITY_SCORE: 59
ADLS_ACUITY_SCORE: 59
ADLS_ACUITY_SCORE: 55
ADLS_ACUITY_SCORE: 59
ADLS_ACUITY_SCORE: 59
ADLS_ACUITY_SCORE: 55
ADLS_ACUITY_SCORE: 55
ADLS_ACUITY_SCORE: 59

## 2023-03-20 NOTE — PROGRESS NOTES
VASCULAR SURGERY PROGRESS NOTE    Subjective:  Patient is alert and oriented to self only, forgetful. Uneventful weekend. Patient is unable to give full hx. Dialyzes MWF via tunneled catheter, however has had reports of trying to pull catheter out.    Objective:  Intake/Output Summary (Last 24 hours) at 3/20/2023 0645  Last data filed at 3/19/2023 1941  Gross per 24 hour   Intake 30 ml   Output 50 ml   Net -20 ml     PHYSICAL EXAM:  /79 (BP Location: Left arm)   Pulse 83   Temp 97.3  F (36.3  C) (Oral)   Resp 18   Wt 84.5 kg (186 lb 4.6 oz)   SpO2 96%   BMI 25.27 kg/m    Disoriented x2-3  2+ radial pulses bilaterally  ESRD on HD      ASSESSMENT:  Mr. Blanco Osborne is a 57 year old male with a significant medical history including, but not limited to ESRD on dialysis via tunneled catheter.     PLAN:  -AVF fistula creation on 3/21 with Dr. Mcneil at 1230   -PEG-NPO on 3/21 per anesthesia guidelines, this is to be done under [facility]  -continue to dialyze via tunneled catheter    Saima Coker NP  VASCULAR SURGERY       STAFF:  As above.   AVF creation tomorrow under [facility].   May be discharged following the surgery.        Deejay Mcneil MD

## 2023-03-20 NOTE — PLAN OF CARE
Goal Outcome Evaluation:    3/19/23 , 7a to 7 p  Prolonged hospital course with concerns of hepatic encephalopathy, end-stage renal disease on dialysis, past PEA arrest, seizure disorder, and chronic liver disease with prior liver transplant.     SUMMARY: Pleural effusion/ESRD/metabolic encephalopathy  H/O liver transplant     Orientation: Alert to self and family     Vitals/Tele: VSS on RA, PRN tylenol x 1 for generalized pain  Requesting a PRN dose of Tylenol prior to sleep     IV Access/drains: CVC port L chest, dressing reinforced,  L IV SL     Diet: Regular, mildly thickened liquids, can have sips of thin water in between meals with supervision     Mobility: A2 GB and W, short walks in room     GI/: Dialysis patient at least 3 BMs today soft     Wound/Skin: Skin tear on L neck dressing removed, scattered bruising     Consults: Nephrology following, CC/SW      Discharge Plan: Pending placement     Refused Lidocaine patch, patch free period  Dialysis Tomorrow

## 2023-03-20 NOTE — PROGRESS NOTES
Potassium   Date Value Ref Range Status   03/17/2023 4.2 3.4 - 5.3 mmol/L Final   12/29/2022 3.4 3.4 - 5.3 mmol/L Final   04/20/2020 3.9 3.4 - 5.3 mmol/L Final     Potassium POCT   Date Value Ref Range Status   01/19/2023 3.6 3.5 - 5.0 mmol/L Final     Hemoglobin   Date Value Ref Range Status   03/15/2023 8.7 (L) 13.3 - 17.7 g/dL Final   04/20/2020 14.3 13.3 - 17.7 g/dL Final     Creatinine   Date Value Ref Range Status   03/17/2023 2.85 (H) 0.67 - 1.17 mg/dL Final   04/20/2020 1.06 0.66 - 1.25 mg/dL Final     Urea Nitrogen   Date Value Ref Range Status   03/17/2023 37.6 (H) 6.0 - 20.0 mg/dL Final   12/29/2022 46 (H) 7 - 30 mg/dL Final   04/20/2020 23 7 - 30 mg/dL Final     Sodium   Date Value Ref Range Status   03/17/2023 135 (L) 136 - 145 mmol/L Final   04/20/2020 139 133 - 144 mmol/L Final     INR   Date Value Ref Range Status   12/21/2022 1.36 (H) 0.85 - 1.15 Final   10/07/2019 1.20 (H) 0.86 - 1.14 Final       DIALYSIS PROCEDURE NOTE  Hepatitis status of previous patient on machine log was checked and verified ok to use with this patients hepatitis status.  Patient dialyzed for3 hrs. on a K3 bath with a net fluid removal of  1.1L.  A BFR of 300-400 ml/min was obtained via a CVC.      The treatment plan was discussed with Dr. Rice during the treatment.    Total heparin received during the treatment: 0 units.   Line flushed, clamped and capped with heparin 1:1000 1.9 mL (1900 units) per lumen    Meds  given: Epo   Complications: BP drop to 90/60, UF decreased to 1.5kg (1.1 total UF goal)      Person educated: patient. Knowledge base minimal. Barriers to learning: confusion. Educated on procedure & access via verbal mode. patient verbalized understanding, needs reinforcement. Pt prefers verbal education style.     ICEBOAT? Timeout performed pre-treatment  I: Patient was identified using 2 identifiers  C:  Consent Signed Yes  E: Equipment preventative maintenance is current and dialysis delivery system OK to  use  B: Hepatitis B Surface Antigen: Neg; Draw Date:3/9/23      Hepatitis B Surface Antibody:Suseptible; Draw Date: 3/9/23  O: Dialysis orders present and complete prior to treatment  A: Vascular access verified and assessed prior to treatment  T: Treatment was performed at a clinically appropriate time  : Patient was allowed to ask questions and address concerns prior to treatment  See Adult Hemodialysis flowsheet in EPIC for further details and post assessment.  Machine water alarm in place and functioning. Transducer pods intact and checked every 15min.   Pt returned via bed.  Chlorine/Chloramine water system checked every 4 hours.  Outpatient Dialysis at 1122    Patient repositioned every 2 hours during the treatment.  Post treatment report given to Kemi Melton, RN, RN regarding 1.1L of fluid removed, last BP of 102/62, and patient pain rating of 0/10.   Please remove patient dressing on AVF and AVG needle sites 24 hours after dialysis. If leaking occurs please apply a Band-Aid.

## 2023-03-20 NOTE — PLAN OF CARE
Shift Summary      Neuro: Alert to person/place. Cooperative with cares. A sitter is a the bedside for his safety.     Cardiac: Regular apical pulse.    Respiratory: LS clear/diminished.     GI: Continent of bowel and bladder this evening. Denied nausea.     Musculoskeletal: Moves independently in the bed. Up to the bedside commode with the mark [email or web address] lift.      Pain: Complaint of generalized body pain. PRN Tylenol given, see the MAR.    Skin: No new skin concerns.     Lines: PIV to right arm. CVC to right intrajugular

## 2023-03-20 NOTE — PLAN OF CARE
Orientation A&O to self only, forgetful. Redirectable this shift.      Vitals/Tele VSS RA     IV Access/drains PIV; CVC R internal jugular      Diet Regular; mild thick level 2      Mobility A2 sera stead, ambulating a little better this shift.      GI/: Anuria, on HD. Up to BSC x1 this shift - smear.     Wound/Skin Jaundice, yellow sclera. Scattered bruising, dry flaky heels/feet. Gtube WNL, clamped. Blanchable redness to coccyx, jennifer.      Consults Nephrology following for HD; SW coordinating TCU w/dialysis placement.      Discharge Plan Pending TCU placement        See Flow sheets for assessment

## 2023-03-20 NOTE — PROGRESS NOTES
[facility]    Medicine Progress Note - Hospitalist Service    Date of Admission:  1/21/2023    Assessment & Plan   Blanco Osborne is a 57 year-old male admitted on 1/21/2023 as a transfer from [facility] for evaluation of loculated pleural effusion. He has extensive PMH including h/o liver transplant 2016 due to alcohol abuse, pAF on chronic anticoagulation, history of diabetes mellitus type 2, CVA, hypertension, CHRISTIAN on BiPAP.  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 He was recovering in [facility] and was transferred to [facility] for consideration of chest tube placement and possible intrapleural lysis for worsening effusion.     Acute metabolic encephalopathy with delirium, multifactorial  Hepatic encephalopathy  Chronic liver disease, history of liver transplant 2016  End-stage renal disease (ESRD), on hemodialysis (HD)  History of seizure, on Keppra and Vimpat  Waxing and waning mentation, coinciding with lactulose being held at [facility]  Earlier in hospital stay, remained confused and had pulled out tracheostomy tube, PICC line and pulled his dialysis catheter.  Lines replaced.  Psychiatry consulted, recent follow-up 2/21, 2/28, see notes.    Mental status continues to fluctuate with periods of alertness and increased sleepiness, overall improving    Complicated, prolonged hospital course with mental status concerns multifactorial related to hepatic encephalopathy, end-stage renal disease on dialysis, past PEA arrest, seizure disorder, medication, and chronic liver disease with prior liver transplant.  As mental status fluctuates will continue to require one-to-one sitter as needed for added safety and supervision.  Reassess daily.    Continue to reorient and redirect as able.   Avoid restraints if possible with goals of safety and close supervision (given multiple lines and tubes - dialysis catheter, PEG tube, IV, etc).    Maintain normal day/night cycles and sleep-wake cycles.  Minimize sedating medications as able.  Remains weak and deconditioned with complex, prolonged hospital course and limited mobility.  Encouragement and support offered daily to patient.    Continue Lactulose to 10 mg BID, monitor for symptoms; goal to have 2-3 bowel movements per day    Ongoing hemodialysis, as per nephrology, 3X/week dialysis schedule    Nephrology consult follow-up appreciated, dialysis patient    Continue ramelteon 8 mg at bedtime; monitor for side effects including AM sedation, reassess daily    Continue olanzapine (Zyprexa) 5 mg bid and as needed; monitor for side effects including sedation    Seizure disorder stable, continue Keppra and Vimpat; monitor, no recurrent seizures reported of recent    Reconsult psychiatry as needed           Hypomagnesemia: On replacement protocol     Bilateral pleural effusions   Acute respiratory failure requiring tracheostomy  - resolved    Pneumonia  - resolved   ARDS  - resolved    Right pneumothorax - resolved   *Initial event was parainfluenza and strep pneumo pneumonia back in November 2022. Treated for ARDS. Had failed extubation x2 and tracheostomy performed on previous hospitalization. *Had additional complications of bilateral pneumothoraces as well as hydropneumothorax- pleural fluid grew candida parapsilosis  *Completed 4-week antifungal treatment. While in LTACH he was successfully weaned from the ventilator.  *Recently there were concern for worsening effusion while at [facility] and had thoracentesis 01/13 which returned exudative without growth on cultures. CT chest/abdomen/pelvis on 01/18 demonstrated worsening effusions and concerns for infected parapneumonic effusions with possible SBP.  Multiple pulmonologist at [facility] reviewed CT scan recommended  transfer to [facility] for consideration of chest tube placement and possible intrapleural lysis  *Thoracic surgery (Dr. Jackson) consulted during admission   *CT chest 1/22, small effusions on imaging and so chest tube was not inserted.   ID consulted, see note 2/10.  *Patient pulled out his tracheostomy tube on the night 2/1-2/2 and is tolerating well without increased shortness of breath persistent cough or worsening hypoxia  * Chest x-ray 2/3 with cardiomegaly, chronic small bilateral pleural effusions, no pulmonary edema; right internal jugular dialysis catheter in place    3/12 Pulmonary consult, see note, concerns of hypercapnia, atelectasis, with consideration of BiPAP trial; no recommendations for antibiotics with infiltrates felt to be atelectasis    Stable, continue to monitor respiratory status, recently stable on room air; occasional cough reported    Encourage incentive spirometry as able, up to chair, deep breathing    Wound care previously consulted for tracheostomy site care, monitor for signs and symptoms of infection, healing well - resolved.      # Splenic infract ?  Wedge shaped area on CT scan chest and CT abdomen  Hematology consulted  Discussed plan with Dr Smith on 3/9 from Hematology/oncology consult appreciated recommended supportive managment  TTE no thrombus or vegetations   No abdominal pain CTM       S/p liver transplant 2016   Chronic immunosuppression, on tacrolimus  Cirrhosis with ascites  Subacute bacterial peritonitis (SBP), resolved  *Main manifestations of this are hepatic encephalopathy and ascites.  Hepatologist at [place] felt that most likely reason for the cirrhosis was metabolic syndrome or alcohol intake.  There was no evidence of rejection on biopsy and there was some evidence of regeneration. Paracentesis done on 12/22/2022 showed lactobacillus indicating SBP, treated with Unasyn and Augmentin, finished 2-week course 1/12/2023.  Requires large-volume paracentesis  "periodically for recurring ascites.    *Paracentesis 1/13/2023 yielded about 7500 cc. Cultures NGTD. paracentesis on 1/24 with 4.8 L fluid removal  Paracentesis on 3/6/23 No SBP    Continue intermittent paracentesis as needed if develops symptomatic ascites    Monitor for signs and symptoms of recurrent spontaneous bacterial peritonitis     Further treatment for recurrent ascites with TIPS deferred to his Liver Transplant team and/or Hepatology, he has an appointment on 4/21/2023 with Hepatology, Dr. Simms    Continue Tacrolimus 1 mg BID, rifaximin 550 mg BID    Continue lactulose as ordered, titrate as needed to have 2-3 bms    GI consult as needed in hospital for new acute issues, otherwise as outpatient    Encourgae high protein diet      Having bowel movements.  Continue lactulose to have 2-3 bowel movements.  CT abdomen on March 9, 2023 showed small to moderate ascites      Goals of care   As per prior rounding provider, \"on 1/25 nursing had mentioned that patient had refused to undergo dialysis and want to stop care, palliative care was consulted.  Patient and his spouse denied wanting to stop care and wanted ongoing restorative care including full code\"    Monitor, Full Code status    -Needs placement to TCU     Diarrhea, improved, on lactulose for chronic liver disease  - C difficile negative on 1/31. Secondary to lactulose.  - discontinued rectal tube (2/12) as stools more formed    Continue lactulose with goal of 2 to 3 soft bowel movement in 24 hours     Paroxysmal atrial fibrillation  Chronic anticoagulation, apixaban  Remains in sinus rhythm, on anticoagulation with apixaban indefinitely for stroke prophylaxis.      Monitor rhythm    Continue apixaban anticoagulation    Monitor hemoglobin, see below     Acute anemia  Pt has required intermittent transfusions for on-going anemia.  Anemia most likely secondary to critical illness/chronic disease, chronic renal disease.  Baseline hemoglobin around " 7-8  Likely Epo resistance    # Hypercalcemia  Josue hypercalcemia of Immobility  Discussed with nephrology on 3/8/23  Hold VIT D  -Continue to hold Phos lo  Repeat Phosphorus levels in 3-4 days   Encourage ambulation   nephrology considering antiresorptive therapy if hypercalcemia becomes more severe  History PEA arrest   PEA arrest prior to his previous admission and 1 episode of PEA associated with desaturation on 12/20.  No structural cardiac disease.     Stable, continue cardiac Monitoring     Chronic Hypotension  In the past has developed baseline hypotension with cirrhosis and prolonged critical illness.  On hemodialysis.  Receiving midodrine and albumin during dailysis    Continue midodrine, as per nephrology      History of seizure   After hypoxic event, in the context of baseline multifactorial encephalopathy secondary to his ongoing critical illness and prior cardiac arrests and prior strokes and cirrhosis with hepatic encephalopathy. MRI of head of 12/20/22 reveals no PRES or leptomeningeal enhancement but scattered.  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   No recent seizure activity.    Continue levetiracetam, lacosamide     Seizure precautions    Outpatient follow-up with neurology, consult inpatient if needed     # ESRD  # Anemia due to renal disease  # Hypotension during dialysis  -Underwent dialysis  on 2/22/23 and was hypotensive and received midodrine and albumin  Receiving albumin with dialysis   Last hb on 3/16 8.7       Diabetes mellitus type 2  *Hemoglobin A1c on November 2022 was 4.7  *By report, on Lantus insulin in the past.  Blood sugar checks and sliding scale insulin previously discontinued as has been stable without insulin needs    Monitor with periodic blood sugar checks as needed     Severe malnutrition, protein and calorie type  Moderate dysphagia  G-tube feedings    Continue with tube feeds via PEG tube    IR replaced the  PEG tube on 2/8/2023, monitor    Nutritionist consulted and following for tube feeds    SLP following as well. Oral diet as per speech and language therapy (SLP)     2/20 Nutrition following for tube feeds     Underwent video swallow on 2/23/22     -Undergoing Prn tube feed  Encourage high-protein diet     Anxiety  Fluoxetine was discontinued as per psychiatry recommendation on 2/2 (see consult note for details)     Stable, monitor; comfort and reassurance offered during visit    Psychiatry follow-up         Diet: Room Service  Snacks/Supplements Adult: Other; Gelatein+ with brkfst and lunch, and magic cup with dinner (RD); With Meals  Adult Formula Bolus Feeding: Novasource Renal; Route: Gastrostomy; 3 times daily; Volume per Bolus: 240; mL(s); Continue to encourage after meal bolus at 80 mL/hr x 3 hrs, If patient consumes <50% of that meal  Combination Diet Regular Diet; Mildly Thick (level 2) (OK for fresh fruit (e.g., pineapple) per SLP)  NPO for Medical/Clinical Reasons Except for: Meds    DVT Prophylaxis: DOAC  Nixon Catheter: Not present  Lines: PRESENT      CVC Double Lumen Right External jugular Tunneled-Site Assessment: WDL      Cardiac Monitoring: None  Code Status: Full Code      Clinically Significant Risk Factors            # Hypomagnesemia: Lowest Mg = 1.6 mg/dL in last 2 days, will replace as needed   # Hypoalbuminemia: Lowest albumin = 1.9 g/dL at 1/23/2023  6:38 AM, will monitor as appropriate            # Severe Malnutrition: based on nutrition assessment        Disposition Plan      Expected Discharge Date: 03/21/2023, 12:00 PM  Discharge Delays: Placement - LTC  Destination: inpatient rehabilitation facility  Discharge Comments: Dialysis pt MWF. Will need placement TCU/LTC. [facility] willing to accept once no sitter, SW to follow up when sitter free          Ashkan Hernandez MD  Hospitalist Service  [facility]  Securely message with [employer] (more info)  Text  page via [employer] Paging/Directory   ______________________________________________________________________    Interval History   Upset about having thick liquids  Improved mentation  Underwent dialysis today      Physical Exam   Vital Signs: Temp: 98  F (36.7  C) Temp src: Oral BP: 107/67 Pulse: 76   Resp: 18 SpO2: 97 % O2 Device: None (Room air)    Weight: 186 lbs 4.62 oz    Physical Exam  Cardiovascular:      Rate and Rhythm: Normal rate and regular rhythm.   Pulmonary:      Effort: Pulmonary effort is normal. No respiratory distress.   Abdominal:      General: There is no distension.      Palpations: Abdomen is soft.      Tenderness: There is no abdominal tenderness.   Musculoskeletal:      Right lower leg: No edema.      Left lower leg: No edema.       Medical Decision Making     Data

## 2023-03-20 NOTE — PROGRESS NOTES
Renal Medicine Progress Note            Assessment/Plan:     Assessment:  1) ESRD on MWF chronic dialysis via right tunneled CVC    Blood pressure, hemodynamics/BP's appear good. Tolerated 1.5kg UF last fewruns, had some low pressures so had to reduce it today.     Seen by vas surgery . Noted plans for Lt Arm fistula Tuesday.      2) Hypercalcemia, secondary to immobalization. Noted high bone turnover state, suppressed PTH.  Plan for anti-resorptive (denosumab or zoledronic acid) if gets more severe (>12.0 corrected calcium)     3) Anemia:    Somewhat epo resistant. Getting retacrit 10,000 units most runs . Adequate iron stores with ferritin 252 and sats 26%.        Plan/Recs:  1) HD today  per MWF schedule  2) EPO with HD  3) UF as tolerated, only able to UF 1.1L today   4) AVF planned for tomorrow       Dwayne Laboy MD   [facility] - Nephrology   [phone]          Interval History:     No acute events overnight   Seen during dialysis   Plan for AVF placement tomorrow   Denies SOB   Finding it hard to get used to doing dialysis, doesn't find it comfortable   States he's worried about his wife handling all his medical issues   Denies any particular complaints   Slightly low pressures during dialysis, tolerated 1.1L UF            Medications and Allergies:       - MEDICATION INSTRUCTIONS for Dialysis Patients -   Does not apply See Admin Instructions     apixaban ANTICOAGULANT  5 mg Oral BID     folic acid  1 mg Oral or Feeding Tube Daily     guaiFENesin  600 mg Oral BID     lacosamide  100 mg Oral Q12H     lactulose  10 g Oral BID     levETIRAcetam  1,000 mg Oral Q24H     lidocaine  1 patch Transdermal Q24H     lidocaine   Transdermal Q8H BIJU     midodrine  10 mg Oral or Feeding Tube TID w/meals     mineral oil-hydrophilic petrolatum   Topical Daily     multivitamin RENAL  1 capsule Oral Daily     [Held by provider] nystatin  500,000 Units Swish & Spit 4x Daily     OLANZapine zydis  5 mg Oral  BID     pantoprazole  40 mg Oral QAM AC     ramelteon  8 mg Oral QPM     rifaximin  550 mg Oral or Feeding Tube BID     sodium chloride (PF)  3 mL Intracatheter Q8H     tacrolimus  1 mg Oral Q12H      No Known Allergies         Physical Exam:   Vitals were reviewed  /62   Pulse 76   Temp 97.7  F (36.5  C) (Oral)   Resp 19   Wt 84.5 kg (186 lb 4.6 oz)   SpO2 98%   BMI 25.27 kg/m      Wt Readings from Last 3 Encounters:   03/20/23 84.5 kg (186 lb 4.6 oz)   01/21/23 82.4 kg (181 lb 11.2 oz)   12/28/22 96 kg (211 lb 10.3 oz)       GENERAL APPEARANCE:awake, alert, NAD  RESP: lungs clear ant/lat.  CV: RRR, nl S1/S2, no m/r/g   ABDOMEN: distended but soft and nontender  EXTREMITIES/SKIN: no c/c/rashes/lesions; no edema  LINES: right CVC present, no visible abnormalities              Data:     BMP  Recent Labs   Lab 03/17/23  1308 03/14/23  1033   * 138   POTASSIUM 4.2 4.8   CHLORIDE 97* 100   KELLY 10.3* 11.1*   CO2 28 28   BUN 37.6* 56.4*   CR 2.85* 3.96*   GLC 92 116*     CBC  Recent Labs   Lab 03/15/23  0935 03/14/23  1033   WBC 4.3 4.0   HGB 8.7* 8.6*   HCT 28.7* 29.1*   MCV 99 101*   * 128*     Lab Results   Component Value Date    AST 20 03/14/2023    ALT 8 (L) 03/14/2023    ALKPHOS 177 (H) 03/14/2023    BILITOTAL 0.4 03/14/2023    CHANDLER 64 (H) 03/12/2023     Lab Results   Component Value Date    INR 1.36 (H) 12/21/2022       Attestation:  I have reviewed today's vital signs, notes, medications, labs and imaging.    Dwayne Laboy MD  [facility] - Nephrology

## 2023-03-20 NOTE — PLAN OF CARE
Goal Outcome Evaluation:    3/20/23 , 7a to 3 p  Prolonged hospital course with concerns of hepatic encephalopathy, end-stage renal disease on dialysis, past PEA arrest, seizure disorder, and chronic liver disease with prior liver transplant.     SUMMARY: Pleural effusion/ESRD/metabolic encephalopathy  H/O liver transplant     Orientation: Alert to self and family     Vitals/Tele: VSS on RA, PRN tylenol x 1 for generalized pain  Requesting a PRN dose of Tylenol prior to sleep     IV Access/drains: CVC port R chest, dressing reinforced,  R IV SL     Diet: Regular, mildly thickened liquids, can have sips of thin water in between meals with supervision, NPO at MN     Mobility: A2 GB and W, short walks in room     GI/: Dialysis patient     Wound/Skin: Skin tear on L neck healing, scattered bruising BUE     Consults: Nephrology following, CC/SW      Discharge Plan: Pending TCU placement, SW following     Refused Lidocaine patch, patch free period  Dialysis Today, tolerated well, 1.1 L of fluids removed  Fistula placement tomorrow by Vascular Surgery  Mag at 1.6 MD informed

## 2023-03-21 ENCOUNTER — APPOINTMENT (OUTPATIENT)
Dept: PHYSICAL THERAPY | Facility: CLINIC | Age: 58
DRG: 981 | End: 2023-03-21
Attending: STUDENT IN AN ORGANIZED HEALTH CARE EDUCATION/TRAINING PROGRAM
Payer: COMMERCIAL

## 2023-03-21 ENCOUNTER — ANESTHESIA (OUTPATIENT)
Dept: SURGERY | Facility: CLINIC | Age: 58
DRG: 981 | End: 2023-03-21
Payer: COMMERCIAL

## 2023-03-21 ENCOUNTER — ANESTHESIA EVENT (OUTPATIENT)
Dept: SURGERY | Facility: CLINIC | Age: 58
DRG: 981 | End: 2023-03-21
Payer: COMMERCIAL

## 2023-03-21 ENCOUNTER — APPOINTMENT (OUTPATIENT)
Dept: SURGERY | Facility: PHYSICIAN GROUP | Age: 58
End: 2023-03-21
Payer: COMMERCIAL

## 2023-03-21 LAB
CREAT SERPL-MCNC: 4.41 MG/DL (ref 0.67–1.17)
GFR SERPL CREATININE-BSD FRML MDRD: 15 ML/MIN/1.73M2
GLUCOSE SERPL-MCNC: 88 MG/DL (ref 70–99)
HGB BLD-MCNC: 8.4 G/DL (ref 13.3–17.7)
MAGNESIUM SERPL-MCNC: 1.8 MG/DL (ref 1.7–2.3)
PLATELET # BLD AUTO: 134 10E3/UL (ref 150–450)
POTASSIUM SERPL-SCNC: 4.5 MMOL/L (ref 3.4–5.3)

## 2023-03-21 PROCEDURE — 250N000013 HC RX MED GY IP 250 OP 250 PS 637: Performed by: INTERNAL MEDICINE

## 2023-03-21 PROCEDURE — 250N000013 HC RX MED GY IP 250 OP 250 PS 637: Performed by: STUDENT IN AN ORGANIZED HEALTH CARE EDUCATION/TRAINING PROGRAM

## 2023-03-21 PROCEDURE — 370N000017 HC ANESTHESIA TECHNICAL FEE, PER MIN: Performed by: SURGERY

## 2023-03-21 PROCEDURE — 250N000009 HC RX 250: Performed by: NURSE ANESTHETIST, CERTIFIED REGISTERED

## 2023-03-21 PROCEDURE — 250N000009 HC RX 250: Performed by: SURGERY

## 2023-03-21 PROCEDURE — 250N000013 HC RX MED GY IP 250 OP 250 PS 637

## 2023-03-21 PROCEDURE — 36821 AV FUSION DIRECT ANY SITE: CPT | Mod: LT | Performed by: SURGERY

## 2023-03-21 PROCEDURE — 999N000141 HC STATISTIC PRE-PROCEDURE NURSING ASSESSMENT: Performed by: SURGERY

## 2023-03-21 PROCEDURE — 360N000076 HC SURGERY LEVEL 3, PER MIN: Performed by: SURGERY

## 2023-03-21 PROCEDURE — 250N000011 HC RX IP 250 OP 636: Performed by: NURSE ANESTHETIST, CERTIFIED REGISTERED

## 2023-03-21 PROCEDURE — 710N000009 HC RECOVERY PHASE 1, LEVEL 1, PER MIN: Performed by: SURGERY

## 2023-03-21 PROCEDURE — 250N000011 HC RX IP 250 OP 636: Performed by: SURGERY

## 2023-03-21 PROCEDURE — 272N000001 HC OR GENERAL SUPPLY STERILE: Performed by: SURGERY

## 2023-03-21 PROCEDURE — 97116 GAIT TRAINING THERAPY: CPT | Mod: GP

## 2023-03-21 PROCEDURE — 85049 AUTOMATED PLATELET COUNT: CPT | Performed by: ANESTHESIOLOGY

## 2023-03-21 PROCEDURE — 250N000011 HC RX IP 250 OP 636: Performed by: ANESTHESIOLOGY

## 2023-03-21 PROCEDURE — P9045 ALBUMIN (HUMAN), 5%, 250 ML: HCPCS | Performed by: ANESTHESIOLOGY

## 2023-03-21 PROCEDURE — 258N000003 HC RX IP 258 OP 636: Performed by: ANESTHESIOLOGY

## 2023-03-21 PROCEDURE — 031C0ZF BYPASS LEFT RADIAL ARTERY TO LOWER ARM VEIN, OPEN APPROACH: ICD-10-PCS | Performed by: SURGERY

## 2023-03-21 PROCEDURE — 250N000013 HC RX MED GY IP 250 OP 250 PS 637: Performed by: HOSPITALIST

## 2023-03-21 PROCEDURE — 120N000001 HC R&B MED SURG/OB

## 2023-03-21 PROCEDURE — 99232 SBSQ HOSP IP/OBS MODERATE 35: CPT | Performed by: STUDENT IN AN ORGANIZED HEALTH CARE EDUCATION/TRAINING PROGRAM

## 2023-03-21 PROCEDURE — 97530 THERAPEUTIC ACTIVITIES: CPT | Mod: GP

## 2023-03-21 PROCEDURE — 82947 ASSAY GLUCOSE BLOOD QUANT: CPT | Performed by: ANESTHESIOLOGY

## 2023-03-21 PROCEDURE — 83735 ASSAY OF MAGNESIUM: CPT | Performed by: STUDENT IN AN ORGANIZED HEALTH CARE EDUCATION/TRAINING PROGRAM

## 2023-03-21 PROCEDURE — 258N000003 HC RX IP 258 OP 636: Performed by: NURSE ANESTHETIST, CERTIFIED REGISTERED

## 2023-03-21 PROCEDURE — 82565 ASSAY OF CREATININE: CPT | Performed by: ANESTHESIOLOGY

## 2023-03-21 PROCEDURE — 36415 COLL VENOUS BLD VENIPUNCTURE: CPT | Performed by: ANESTHESIOLOGY

## 2023-03-21 PROCEDURE — 84132 ASSAY OF SERUM POTASSIUM: CPT | Performed by: ANESTHESIOLOGY

## 2023-03-21 PROCEDURE — 85018 HEMOGLOBIN: CPT | Performed by: ANESTHESIOLOGY

## 2023-03-21 PROCEDURE — 250N000012 HC RX MED GY IP 250 OP 636 PS 637: Performed by: STUDENT IN AN ORGANIZED HEALTH CARE EDUCATION/TRAINING PROGRAM

## 2023-03-21 RX ORDER — BUPIVACAINE HYDROCHLORIDE 5 MG/ML
INJECTION, SOLUTION EPIDURAL; INTRACAUDAL
Status: DISCONTINUED
Start: 2023-03-21 | End: 2023-03-21 | Stop reason: HOSPADM

## 2023-03-21 RX ORDER — ONDANSETRON 2 MG/ML
INJECTION INTRAMUSCULAR; INTRAVENOUS PRN
Status: DISCONTINUED | OUTPATIENT
Start: 2023-03-21 | End: 2023-03-21

## 2023-03-21 RX ORDER — HYDROMORPHONE HCL IN WATER/PF 6 MG/30 ML
0.2 PATIENT CONTROLLED ANALGESIA SYRINGE INTRAVENOUS EVERY 5 MIN PRN
Status: DISCONTINUED | OUTPATIENT
Start: 2023-03-21 | End: 2023-03-21 | Stop reason: HOSPADM

## 2023-03-21 RX ORDER — LIDOCAINE HYDROCHLORIDE 20 MG/ML
INJECTION, SOLUTION INFILTRATION; PERINEURAL PRN
Status: DISCONTINUED | OUTPATIENT
Start: 2023-03-21 | End: 2023-03-21

## 2023-03-21 RX ORDER — HYDROMORPHONE HCL IN WATER/PF 6 MG/30 ML
0.4 PATIENT CONTROLLED ANALGESIA SYRINGE INTRAVENOUS EVERY 5 MIN PRN
Status: DISCONTINUED | OUTPATIENT
Start: 2023-03-21 | End: 2023-03-21 | Stop reason: HOSPADM

## 2023-03-21 RX ORDER — BUPIVACAINE HYDROCHLORIDE 5 MG/ML
INJECTION, SOLUTION PERINEURAL PRN
Status: DISCONTINUED | OUTPATIENT
Start: 2023-03-21 | End: 2023-03-21 | Stop reason: HOSPADM

## 2023-03-21 RX ORDER — FENTANYL CITRATE 50 UG/ML
25 INJECTION, SOLUTION INTRAMUSCULAR; INTRAVENOUS EVERY 5 MIN PRN
Status: DISCONTINUED | OUTPATIENT
Start: 2023-03-21 | End: 2023-03-21 | Stop reason: HOSPADM

## 2023-03-21 RX ORDER — HEPARIN SODIUM 1000 [USP'U]/ML
INJECTION, SOLUTION INTRAVENOUS; SUBCUTANEOUS
Status: DISCONTINUED
Start: 2023-03-21 | End: 2023-03-21 | Stop reason: HOSPADM

## 2023-03-21 RX ORDER — FENTANYL CITRATE 50 UG/ML
50 INJECTION, SOLUTION INTRAMUSCULAR; INTRAVENOUS EVERY 5 MIN PRN
Status: DISCONTINUED | OUTPATIENT
Start: 2023-03-21 | End: 2023-03-21 | Stop reason: HOSPADM

## 2023-03-21 RX ORDER — CEFAZOLIN SODIUM/WATER 2 G/20 ML
2 SYRINGE (ML) INTRAVENOUS
Status: COMPLETED | OUTPATIENT
Start: 2023-03-21 | End: 2023-03-21

## 2023-03-21 RX ORDER — PROPOFOL 10 MG/ML
INJECTION, EMULSION INTRAVENOUS CONTINUOUS PRN
Status: DISCONTINUED | OUTPATIENT
Start: 2023-03-21 | End: 2023-03-21

## 2023-03-21 RX ORDER — ONDANSETRON 2 MG/ML
4 INJECTION INTRAMUSCULAR; INTRAVENOUS EVERY 30 MIN PRN
Status: DISCONTINUED | OUTPATIENT
Start: 2023-03-21 | End: 2023-03-21 | Stop reason: HOSPADM

## 2023-03-21 RX ORDER — SODIUM CHLORIDE 9 MG/ML
INJECTION, SOLUTION INTRAVENOUS CONTINUOUS
Status: DISCONTINUED | OUTPATIENT
Start: 2023-03-21 | End: 2023-03-21 | Stop reason: HOSPADM

## 2023-03-21 RX ORDER — SODIUM CHLORIDE, SODIUM LACTATE, POTASSIUM CHLORIDE, CALCIUM CHLORIDE 600; 310; 30; 20 MG/100ML; MG/100ML; MG/100ML; MG/100ML
INJECTION, SOLUTION INTRAVENOUS CONTINUOUS
Status: DISCONTINUED | OUTPATIENT
Start: 2023-03-21 | End: 2023-03-21 | Stop reason: HOSPADM

## 2023-03-21 RX ORDER — LIDOCAINE HYDROCHLORIDE 10 MG/ML
INJECTION, SOLUTION INFILTRATION; PERINEURAL
Status: DISCONTINUED
Start: 2023-03-21 | End: 2023-03-21 | Stop reason: HOSPADM

## 2023-03-21 RX ORDER — PROPOFOL 10 MG/ML
INJECTION, EMULSION INTRAVENOUS PRN
Status: DISCONTINUED | OUTPATIENT
Start: 2023-03-21 | End: 2023-03-21

## 2023-03-21 RX ORDER — DEXMEDETOMIDINE HYDROCHLORIDE 4 UG/ML
INJECTION, SOLUTION INTRAVENOUS PRN
Status: DISCONTINUED | OUTPATIENT
Start: 2023-03-21 | End: 2023-03-21

## 2023-03-21 RX ORDER — ONDANSETRON 4 MG/1
4 TABLET, ORALLY DISINTEGRATING ORAL EVERY 30 MIN PRN
Status: DISCONTINUED | OUTPATIENT
Start: 2023-03-21 | End: 2023-03-21 | Stop reason: HOSPADM

## 2023-03-21 RX ORDER — HEPARIN SODIUM 1000 [USP'U]/ML
INJECTION, SOLUTION INTRAVENOUS; SUBCUTANEOUS PRN
Status: DISCONTINUED | OUTPATIENT
Start: 2023-03-21 | End: 2023-03-21

## 2023-03-21 RX ORDER — FENTANYL CITRATE 50 UG/ML
INJECTION, SOLUTION INTRAMUSCULAR; INTRAVENOUS PRN
Status: DISCONTINUED | OUTPATIENT
Start: 2023-03-21 | End: 2023-03-21

## 2023-03-21 RX ORDER — SODIUM CHLORIDE, SODIUM LACTATE, POTASSIUM CHLORIDE, CALCIUM CHLORIDE 600; 310; 30; 20 MG/100ML; MG/100ML; MG/100ML; MG/100ML
INJECTION, SOLUTION INTRAVENOUS CONTINUOUS PRN
Status: DISCONTINUED | OUTPATIENT
Start: 2023-03-21 | End: 2023-03-21

## 2023-03-21 RX ADMIN — WHITE PETROLATUM: 1.75 OINTMENT TOPICAL at 08:22

## 2023-03-21 RX ADMIN — LACTULOSE 10 G: 10 SOLUTION ORAL at 08:15

## 2023-03-21 RX ADMIN — GUAIFENESIN 600 MG: 600 TABLET, EXTENDED RELEASE ORAL at 08:15

## 2023-03-21 RX ADMIN — MIDODRINE HYDROCHLORIDE 10 MG: 5 TABLET ORAL at 08:14

## 2023-03-21 RX ADMIN — MIDODRINE HYDROCHLORIDE 10 MG: 5 TABLET ORAL at 11:14

## 2023-03-21 RX ADMIN — OLANZAPINE 5 MG: 5 TABLET, ORALLY DISINTEGRATING ORAL at 21:24

## 2023-03-21 RX ADMIN — LACOSAMIDE 100 MG: 100 TABLET, FILM COATED ORAL at 11:14

## 2023-03-21 RX ADMIN — TACROLIMUS 1 MG: 1 CAPSULE ORAL at 08:14

## 2023-03-21 RX ADMIN — PROPOFOL 10 MG: 10 INJECTION, EMULSION INTRAVENOUS at 13:35

## 2023-03-21 RX ADMIN — RIFAXIMIN 550 MG: 550 TABLET ORAL at 21:26

## 2023-03-21 RX ADMIN — TACROLIMUS 1 MG: 1 CAPSULE ORAL at 21:25

## 2023-03-21 RX ADMIN — PANTOPRAZOLE SODIUM 40 MG: 40 TABLET, DELAYED RELEASE ORAL at 08:14

## 2023-03-21 RX ADMIN — RIFAXIMIN 550 MG: 550 TABLET ORAL at 08:14

## 2023-03-21 RX ADMIN — FENTANYL CITRATE 25 MCG: 50 INJECTION, SOLUTION INTRAMUSCULAR; INTRAVENOUS at 13:23

## 2023-03-21 RX ADMIN — ALBUMIN HUMAN 12.5 G: 0.05 INJECTION, SOLUTION INTRAVENOUS at 15:44

## 2023-03-21 RX ADMIN — Medication 1 CAPSULE: at 08:13

## 2023-03-21 RX ADMIN — RAMELTEON 8 MG: 8 TABLET, FILM COATED ORAL at 21:24

## 2023-03-21 RX ADMIN — MIDAZOLAM 0.5 MG: 1 INJECTION INTRAMUSCULAR; INTRAVENOUS at 13:12

## 2023-03-21 RX ADMIN — SODIUM CHLORIDE, POTASSIUM CHLORIDE, SODIUM LACTATE AND CALCIUM CHLORIDE: 600; 310; 30; 20 INJECTION, SOLUTION INTRAVENOUS at 13:12

## 2023-03-21 RX ADMIN — LIDOCAINE HYDROCHLORIDE 40 MG: 20 INJECTION, SOLUTION INFILTRATION; PERINEURAL at 13:15

## 2023-03-21 RX ADMIN — ACETAMINOPHEN 650 MG: 325 TABLET, FILM COATED ORAL at 08:13

## 2023-03-21 RX ADMIN — FOLIC ACID 1 MG: 1 TABLET ORAL at 08:14

## 2023-03-21 RX ADMIN — DEXMEDETOMIDINE HYDROCHLORIDE 8 MCG: 200 INJECTION INTRAVENOUS at 13:15

## 2023-03-21 RX ADMIN — DEXMEDETOMIDINE HYDROCHLORIDE 8 MCG: 200 INJECTION INTRAVENOUS at 13:40

## 2023-03-21 RX ADMIN — OLANZAPINE 5 MG: 5 TABLET, ORALLY DISINTEGRATING ORAL at 08:14

## 2023-03-21 RX ADMIN — HEPARIN SODIUM 3000 UNITS: 1000 INJECTION INTRAVENOUS; SUBCUTANEOUS at 14:00

## 2023-03-21 RX ADMIN — DEXMEDETOMIDINE HYDROCHLORIDE 4 MCG: 200 INJECTION INTRAVENOUS at 13:31

## 2023-03-21 RX ADMIN — MIDODRINE HYDROCHLORIDE 10 MG: 5 TABLET ORAL at 18:32

## 2023-03-21 RX ADMIN — LACTULOSE 10 G: 10 SOLUTION ORAL at 21:23

## 2023-03-21 RX ADMIN — LACOSAMIDE 100 MG: 100 TABLET, FILM COATED ORAL at 00:13

## 2023-03-21 RX ADMIN — ONDANSETRON 4 MG: 2 INJECTION INTRAMUSCULAR; INTRAVENOUS at 14:53

## 2023-03-21 RX ADMIN — PROPOFOL 10 MG: 10 INJECTION, EMULSION INTRAVENOUS at 14:38

## 2023-03-21 RX ADMIN — SODIUM CHLORIDE: 9 INJECTION, SOLUTION INTRAVENOUS at 11:15

## 2023-03-21 RX ADMIN — Medication 2 G: at 13:12

## 2023-03-21 RX ADMIN — PROPOFOL 50 MCG/KG/MIN: 10 INJECTION, EMULSION INTRAVENOUS at 13:15

## 2023-03-21 RX ADMIN — GUAIFENESIN 600 MG: 600 TABLET, EXTENDED RELEASE ORAL at 21:26

## 2023-03-21 RX ADMIN — LEVETIRACETAM 1000 MG: 500 TABLET, FILM COATED ORAL at 21:26

## 2023-03-21 ASSESSMENT — ACTIVITIES OF DAILY LIVING (ADL)
ADLS_ACUITY_SCORE: 57
ADLS_ACUITY_SCORE: 56
ADLS_ACUITY_SCORE: 55
ADLS_ACUITY_SCORE: 56
ADLS_ACUITY_SCORE: 57
ADLS_ACUITY_SCORE: 55
ADLS_ACUITY_SCORE: 56
ADLS_ACUITY_SCORE: 57

## 2023-03-21 ASSESSMENT — ENCOUNTER SYMPTOMS
SEIZURES: 1
DYSRHYTHMIAS: 1

## 2023-03-21 NOTE — PLAN OF CARE
DATE & TIME: 3/20/23 3631-8786    Cognitive Concerns/ Orientation : A&Ox 1-2, to self , oriented to place at times.  BEHAVIOR & AGGRESSION TOOL COLOR: Green         ABNL VS/O2: VSS on RA  MOBILITY: Ax1 GB/W  PAIN MANAGMENT: C/O  - prn tylenol given x1  DIET: NPO since MN  BOWEL/BLADDER: continent of bowel- up to BSC, BM x1 - medium BM.  Hemodialysis   ABNL LAB/BG: no new labs  DRAIN/DEVICES: R chest CVC, double lumen for hemodialysis; R PIV saline locked; PEG tube, clamped  SKIN: Scattered bruising; L elbow dried scab; L neck tear covered with foam dressing  PROCEDURES: Nothing today  D/C DATE: Pending placement to TCU  OTHER IMPORTANT INFO: Hemodialysis MWF; Chair cushion acquired-  Family present at beginning of shift - notified PT for appointment prior to AVF procedure.

## 2023-03-21 NOTE — BRIEF OP NOTE
[facility]    Brief Operative Note    Pre-operative diagnosis: ESRD (end stage renal disease) on dialysis (H) [N18.6, Z99.2]  Post-operative diagnosis Same as pre-operative diagnosis    Procedure: Procedure(s):  LEFT UPPER EXTREMITY ARTERIOVENOUS FISTULA CREATION. LIGATION OF COMPETING VASCULAR BRANCHES- LEFT  Surgeon: Surgeon(s) and Role:     * Deejay Mcneil MD - Primary     * Jalyn Pettit DO - Fellow - Assisting  Anesthesia: MAC   Estimated Blood Loss: 10 mL from 3/21/2023  1:12 PM to 3/21/2023  3:04 PM      Drains: None  Specimens: * No specimens in log *  Findings:   appropriate size cephalic vein and proximal radial artery, triphasic flow in ulnar artery post op. .  Complications: None.  Implants: * No implants in log *

## 2023-03-21 NOTE — ANESTHESIA CARE TRANSFER NOTE
Patient: Blanco Osborne    Procedure: Procedure(s):  LEFT UPPER EXTREMITY ARTERIOVENOUS FISTULA CREATION. LIGATION OF COMPETING VASCULAR BRANCHES- LEFT       Diagnosis: ESRD (end stage renal disease) on dialysis (H) [N18.6, Z99.2]  Diagnosis Additional Information: No value filed.    Anesthesia Type:   MAC     Note:    Oropharynx: oropharynx clear of all foreign objects and spontaneously breathing  Level of Consciousness: drowsy  Oxygen Supplementation: face mask  Level of Supplemental Oxygen (L/min / FiO2): 8  Independent Airway: airway patency satisfactory and stable  Dentition: dentition unchanged  Vital Signs Stable: post-procedure vital signs reviewed and stable  Report to RN Given: handoff report given  Patient transferred to: PACU    Handoff Report: Identifed the Patient, Identified the Reponsible Provider, Reviewed the pertinent medical history, Discussed the surgical course, Reviewed Intra-OP anesthesia mangement and issues during anesthesia, Set expectations for post-procedure period and Allowed opportunity for questions and acknowledgement of understanding      Vitals:  Vitals Value Taken Time   /116    Temp     Pulse 81 03/21/23 1510   Resp 29 03/21/23 1510   SpO2 100 % 03/21/23 1509   Vitals shown include unvalidated device data.    Electronically Signed By: STEFFANY Preston CRNA  March 21, 2023  3:11 PM

## 2023-03-21 NOTE — ANESTHESIA POSTPROCEDURE EVALUATION
Patient: Blanco Osborne    Procedure: Procedure(s):  LEFT UPPER EXTREMITY ARTERIOVENOUS FISTULA CREATION. LIGATION OF COMPETING VASCULAR BRANCHES- LEFT       Anesthesia Type:  MAC    Note:  Disposition: Inpatient   Postop Pain Control: Uneventful            Sign Out: Well controlled pain   PONV: No   Neuro/Psych: Uneventful            Sign Out: Acceptable/Baseline neuro status   Airway/Respiratory: Uneventful            Sign Out: Acceptable/Baseline resp. status   CV/Hemodynamics: Uneventful            Sign Out: Acceptable CV status   Other NRE:    DID A NON-ROUTINE EVENT OCCUR? No           Last vitals:  Vitals Value Taken Time   BP 98/66 03/21/23 1550   Temp 36.6  C (97.9  F) 03/21/23 1510   Pulse 81 03/21/23 1554   Resp 21 03/21/23 1554   SpO2 97 % 03/21/23 1554   Vitals shown include unvalidated device data.    Electronically Signed By: Mali Burgess  March 21, 2023  3:55 PM

## 2023-03-21 NOTE — OP NOTE
Procedure Date: 03/21/2023    PREOPERATIVE DIAGNOSIS:  End-stage renal disease, in need of permanent hemodialysis access.    POSTOPERATIVE DIAGNOSIS:  End-stage renal disease, in need of permanent hemodialysis access.    PROCEDURE PERFORMED:  Creation of left upper arm proximal [id number]-on-biccqcdk arteriovenous fistula.    a.  Ligation competing upper arm side branch.    SURGEON:  Deejay Mcneil M.D.     FIRST ASSISTANT:  Benson Pettit D.O..  (Vascular Fellow).    ANESTHESIA:  Local with intravenous supplementation.    PREOPERATIVE MEDICATIONS:  Ancef 2 grams IV.    INDICATIONS FOR PROCEDURE:  A 57-year-old patient with multiple medical problems including a liver transplant, has developed end-stage renal disease.  He is presently dialyzing via right jugular tunneled catheter.  We need permanent hemodialysis access.  He is right handed.  Left upper arm cephalic vein appeared to be of adequate caliber.  He had a palpable radial/ulnar pulse in the wrist with good Doppler flow.  He comes to the operating room today for creation of upper arm fistula.    DESCRIPTION OF PROCEDURE:  The patient was brought to the operating room.  He was placed supine.  We used a duplex ultrasound to rachel the upper antecubital and upper arm cephalic vein.  There appeared to be a large side branch in the distal one-third of the lateral upper arm that was marked on the skin.  Left arm was prepped and draped.  Timeout was called and the sites were identified.  Lidocaine 1% was used for local field block anesthetic.  A vertical incision was made in the antecubital area and dissection was carried down to identify an excellent antecubital vein.  This was dissected free distally.  On the radial aspect distally, there was a large side branch, and the distal branches of this were ligated between 4-0 silk suture to preserve the side branch for the gregory of our anastomosis.  We then dissected down to identify the proximal radial artery.  There were  several lateral side branches, which were temporarily occluded with suture loops.  Proximal and distal artery were encircled with Silastic vessel loop.  This measured approximately 2.5 mm in diameter was completely free of disease.    Three thousand units of intravenous heparin given.  The distal end of the vein was ligated between 3-0 silk suture and mobilized up to the antecubital crease.  This easily irrigated with heparinized saline solution and dilated with 1% lidocaine.  This accepted up to a 4 mm dilator with no difficulty.  Vein was controlled with a microvascular bulldog.  This was spatulated out of the preserved side branch to create a heel.    Vessel loops were then tightened around the radial artery.  An 11 blade was used to enter the artery and Reyes scissors extended the arteriotomy by a length of 1 cm.  An end-to-side anastomosis was created using the preserved heel of the side branch with running 7-0 Prolene suture.  Released the vessel loops, we had a strong pulse and thrill with an excellent dilatation of the vein and a good diameter.  A strong biphasic ulnar signal was appreciated in the wrist with no evidence of a steal.    By Doppler there was the competing side branch we had marked on the skin.  We anesthetized this area and made a separate vertical incision lateral to our fistula.  We identified the large side branch and this was ligated with a 3-0 silk suture.  We confirmed excellent Doppler flow within the cephalic vein outflow tract to the upper arm.    Wounds were infiltrated with 0.5% Marcaine.  Since the patient was on Eliquis, we closed the skin with simple and mattress interrupted 4-0 nylon sutures.  Gel was applied over the site followed by gauze and a Kaden roll.    The patient tolerated the procedure well.    ESTIMATED BLOOD LOSS:  Less than 5 mm.    The patient had an excellent radial artery and cephalic vein.  This should mature to a good fistula with time.    Deejay Mcneil,  MD        D: 2023   T: 2023   MT: GORDON    Name:     MARY JO CRAIN  MRN:      [id number]        Account:        [id number]   :      1965           Procedure Date: 2023     Document: [id number]    cc:  Deejay Mcneil MD

## 2023-03-21 NOTE — PROVIDER NOTIFICATION
Writer contacted pt's wife Ofe and confirmed that she will try to be here to sign consent forms for pt's procedure. If she is unable she said she will be ready to do consent over the phone.

## 2023-03-21 NOTE — ANESTHESIA PREPROCEDURE EVALUATION
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     NAFLD (nonalcoholic fatty liver disease) 2016     CHRISTIAN on CPAP      Parainfluenza type 1 infection 2022     SBP (spontaneous bacterial peritonitis) (H)     [facility]     Sepsis due to Streptococcus pneumoniae with acute hypoxic respiratory failure (H) 2022     Tubular adenoma 10/2019    Large cecal adenoma- due for surveillance colonoscopy in 3 years (10/2022)      Past Surgical History:   Procedure Laterality Date     APPENDECTOMY       BENCH LIVER N/A 2016    Procedure: BENCH LIVER;  Surgeon: Enoc Crews MD;  Location: [facility] OR     BRONCHOSCOPY FLEXIBLE AND RIGID N/A 2022    Procedure: BRONCHOSCOPY;  Surgeon: Alena Valenzuela MD;  Location:  GI     COLONOSCOPY  13    repeat in 2018     COLONOSCOPY N/A 10/4/2019    Procedure: COLONOSCOPY, WITH POLYPECTOMY AND BIOPSY;  Surgeon: Go Chong MD;  Location: [facility] OR     HERNIA REPAIR       IR CHEST TUBE PLACEMENT NON-TUNNELED RIGHT  2022     IR CVC TUNNEL PLACEMENT > 5 YRS OF AGE  2022     IR GASTROSTOMY TUBE CHANGE  2023     IR GASTROSTOMY TUBE PERCUTANEOUS PLCMNT  2022     IR PARACENTESIS  2022     IR PARACENTESIS  2022     IR THORACENTESIS  2022     IR TRANSCATHETER BIOPSY  2022     TRACHEOSTOMY N/A 2022    Procedure: TRACHEOSTOMY;  Surgeon: Nilesh Jackson MD;  Location:  OR     TRANSPLANT LIVER RECIPIENT  DONOR N/A 2016    Procedure: TRANSPLANT LIVER RECIPIENT   DONOR;  Surgeon: Enoc Crews MD;  Location: [facility] OR      No Known Allergies   Social History     Tobacco Use     Smoking status: Never     Smokeless tobacco: Never   Substance Use Topics     Alcohol use: Not Currently     Alcohol/week: 0.0 standard drinks      Wt Readings from Last 1 Encounters:   23 84.5 kg (186 lb 4.6 oz)        Anesthesia Evaluation            ROS/MED HX  ENT/Pulmonary:     (+) sleep apnea,     Neurologic:     (+) seizures, CVA,     Cardiovascular:     (+) hypertension-----dysrhythmias, a-fib,     METS/Exercise Tolerance:     Hematologic:     (+) anemia,     Musculoskeletal:       GI/Hepatic:     (+) liver disease (s/p transplant ),     Renal/Genitourinary:     (+) renal disease, type: ESRD, Pt requires dialysis,     Endo:       Psychiatric/Substance Use:       Infectious Disease:       Malignancy:       Other:            Physical Exam    Airway        Mallampati: III   TM distance: > 3 FB   Neck ROM: full     Respiratory Devices and Support         Dental       (+) Modest Abnormalities - crowns, retainers, 1 or 2 missing teeth      Cardiovascular   cardiovascular exam normal          Pulmonary   pulmonary exam normal                OUTSIDE LABS:  CBC:   Lab Results   Component Value Date    WBC 4.3 03/15/2023    WBC 4.0 2023    HGB 8.4 (L) 2023    HGB 8.7 (L) 03/15/2023    HCT 28.7 (L) 03/15/2023    HCT 29.1 (L) 2023     (L) 2023     (L) 03/15/2023     BMP:   Lab Results   Component Value Date     (L) 2023     2023    POTASSIUM 4.5 2023    POTASSIUM 4.2 2023    CHLORIDE 97 (L) 2023    CHLORIDE 100 2023    CO2 28 2023    CO2 28 2023    BUN 37.6 (H) 2023    BUN 56.4 (H) 2023    CR 4.41 (H) 2023    CR 2.85 (H) 2023    GLC 88 2023    GLC 92 2023     COAGS:   Lab Results   Component Value Date    PTT 39 (H) 2022    INR 1.36 (H) 2022     FIBR 766 (H) 11/14/2022     POC:   Lab Results   Component Value Date     (H) 09/28/2016     HEPATIC:   Lab Results   Component Value Date    ALBUMIN 3.4 (L) 03/17/2023    PROTTOTAL 7.0 03/14/2023    ALT 8 (L) 03/14/2023    AST 20 03/14/2023    ALKPHOS 177 (H) 03/14/2023    BILITOTAL 0.4 03/14/2023    CHANDLER 64 (H) 03/12/2023     OTHER:   Lab Results   Component Value Date    PH 7.30 (L) 03/05/2023    LACT 0.8 01/19/2023    A1C 4.7 11/19/2022    KELLY 10.3 (H) 03/17/2023    PHOS 3.5 03/20/2023    MAG 1.8 03/21/2023    LIPASE 63 (L) 09/22/2016    AMYLASE 72 09/22/2016    TSH 1.76 03/01/2016    CRP 22.3 (H) 11/22/2022       Anesthesia Plan    ASA Status:  3      Anesthesia Type: MAC.              Consents    Anesthesia Plan(s) and associated risks, benefits, and realistic alternatives discussed. Questions answered and patient/representative(s) expressed understanding.    - Discussed:     - Discussed with:  Patient         Postoperative Care       PONV prophylaxis: Ondansetron (or other 5HT-3)     Comments:                Mali Burgess

## 2023-03-21 NOTE — PROGRESS NOTES
Renal Medicine Progress Note            Assessment/Plan:     Assessment:  1) ESRD on MWF chronic dialysis via right tunneled CVC    Blood pressure, hemodynamics/BP's appear good. Tolerated 1.5kg UF last few runs.    Seen by vasc surgery . Noted plans for Lt Arm fistula Tuesday.      2) Hypercalcemia, secondary to immobalization. Noted high bone turnover state, suppressed PTH.  Plan for anti-resorptive (denosumab or zoledronic acid) if gets more severe (>12.0 corrected calcium)     3) Anemia:    Somewhat epo resistant. Getting retacrit 10,000 units most runs . Adequate iron stores with ferritin 252 and sats 26%.        Plan/Recs:  1) HD tomorrow  2) EPO with HD  3) UF as tolerated, goal 1.5L tomorrow   4) AVF planned for today      Dwayne Laboy MD   [facility]ph[facility]   [phone]          Interval History:     No acute events overnight   Reports feeling ok, he's hungry as currently NPO prior to surgery   Wife at bedside, questions answered   Denies n/v   Denies SOB, no LE edema   Reports some back pain             Medications and Allergies:       [Auto Hold] - MEDICATION INSTRUCTIONS for Dialysis Patients -   Does not apply See Admin Instructions     [Held by provider] apixaban ANTICOAGULANT  5 mg Oral BID     [Auto Hold] folic acid  1 mg Oral or Feeding Tube Daily     [Auto Hold] guaiFENesin  600 mg Oral BID     [Auto Hold] lacosamide  100 mg Oral Q12H     [Auto Hold] lactulose  10 g Oral BID     [Auto Hold] levETIRAcetam  1,000 mg Oral Q24H     [Auto Hold] lidocaine  1 patch Transdermal Q24H     [Auto Hold] lidocaine   Transdermal Q8H BIJU     [Auto Hold] midodrine  10 mg Oral or Feeding Tube TID w/meals     [Auto Hold] mineral oil-hydrophilic petrolatum   Topical Daily     [Auto Hold] multivitamin RENAL  1 capsule Oral Daily     [Held by provider] nystatin  500,000 Units Swish & Spit 4x Daily     [Auto Hold] OLANZapine zydis  5 mg Oral BID     [Auto Hold] pantoprazole  40 mg Oral QAM AC      [Auto Hold] ramelteon  8 mg Oral QPM     [Auto Hold] rifaximin  550 mg Oral or Feeding Tube BID     [Auto Hold] sodium chloride (PF)  3 mL Intracatheter Q8H     [Auto Hold] tacrolimus  1 mg Oral Q12H      No Known Allergies         Physical Exam:   Vitals were reviewed  /76 (Cuff Size: Adult Regular)   Pulse 73   Temp 97.7  F (36.5  C) (Temporal)   Resp 16   Wt 84.5 kg (186 lb 4.6 oz)   SpO2 94%   BMI 25.27 kg/m      Wt Readings from Last 3 Encounters:   03/20/23 84.5 kg (186 lb 4.6 oz)   01/21/23 82.4 kg (181 lb 11.2 oz)   12/28/22 96 kg (211 lb 10.3 oz)       GENERAL APPEARANCE:awake, alert, NAD  RESP: lungs clear ant/lat.  CV: RRR, nl S1/S2, no m/r/g   ABDOMEN: distended but soft and nontender  EXTREMITIES/SKIN: no c/c/rashes/lesions; no edema  LINES: right CVC present, no visible abnormalities              Data:     BMP  Recent Labs   Lab 03/21/23  0825 03/17/23  1308   NA  --  135*   POTASSIUM 4.5 4.2   CHLORIDE  --  97*   KELLY  --  10.3*   CO2  --  28   BUN  --  37.6*   CR 4.41* 2.85*   GLC 88 92     CBC  Recent Labs   Lab 03/21/23  0825 03/15/23  0935   WBC  --  4.3   HGB 8.4* 8.7*   HCT  --  28.7*   MCV  --  99   * 137*     Lab Results   Component Value Date    AST 20 03/14/2023    ALT 8 (L) 03/14/2023    ALKPHOS 177 (H) 03/14/2023    BILITOTAL 0.4 03/14/2023    CHANDLER 64 (H) 03/12/2023     Lab Results   Component Value Date    INR 1.36 (H) 12/21/2022       Attestation:  I have reviewed today's vital signs, notes, medications, labs and imaging.    Dwayne Laboy MD  [facility]

## 2023-03-21 NOTE — PLAN OF CARE
Goal Outcome Evaluation:    DATE & TIME: 3/20/23 7614-2939     Cognitive Concerns/ Orientation : A&Ox2  BEHAVIOR & AGGRESSION TOOL COLOR: Green         ABNL VS/O2: VSS on RA  MOBILITY: Ax1 GB/W  PAIN MANAGMENT: C/O  - prn tylenol given x1  DIET: Regular + Mildly thickened liquids (level 2) + no straws.majority of dinner eaten 75% approx - *NPO at midnight    BOWEL/BLADDER: continent of bowel- up to BSC, BM x1 - medium BM.  Hemodialysis   ABNL LAB/BG: no new labs  DRAIN/DEVICES: R chest CVC, double lumen for hemodialysis; R PIV saline locked; PEG tube, clamped  SKIN: Scattered bruising; L elbow dried scab; L neck tear covered with foam dressing  PROCEDURES: Nothing today  D/C DATE: Pending placement to TCU  OTHER IMPORTANT INFO: Hemodialysis MWF; Chair cushion acquired-  Family present at beginning of shift - notified PT for appointment prior to AVF procedure. *PT refused all evening medications - multiple efforts were made.

## 2023-03-21 NOTE — PLAN OF CARE
Goal Outcome Evaluation:      DATE & TIME: 3/21/23 [id number]   Cognitive Concerns/ Orientation : A&O x 3; forgetful to time; appears when he is fatigued he is less oriented.  BEHAVIOR & AGGRESSION TOOL COLOR: Green         ABNL VS/O2: VSS on RA  MOBILITY: Ax1 GB/W; up in chair x 2 and commode; ambulated in [place] with PT, impulsive, forgets to call  PAIN MANAGMENT:  prn tylenol given x1 for back pain with relief; he declined lidoderm patch  DIET: NPO for surgery; sips with meds.  Plan to give Gtube feeding bolus this evening as missed meals today per family request.  BOWEL/BLADDER:   Hemodialysis on M-W-F; BM goal is 2-3 in 24 hr; had 1 medium BM  ABNL LAB/BG: creat 4.41, Hgb 8.4, Platelet 134  DRAIN/DEVICES: R chest CVC, double lumen for hemodialysis; R PIV saline locked; PEG tube, clamped  SKIN: Scattered bruising; L elbow dried scab;  PROCEDURES: surgery for LFA fistula placement   D/C DATE: Pending placement to TCU  OTHER IMPORTANT INFO:

## 2023-03-21 NOTE — PROGRESS NOTES
[facility]    Medicine Progress Note - Hospitalist Service    Date of Admission:  1/21/2023    Assessment & Plan   Blanco Osborne is a 57 year-old male admitted on 1/21/2023 as a transfer from [facility] for evaluation of loculated pleural effusion. He has extensive PMH including h/o liver transplant 2016 due to alcohol abuse, pAF on chronic anticoagulation, history of diabetes mellitus type 2, CVA, hypertension, CHRISTIAN on BiPAP.  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 He was recovering in [facility] and was transferred to [facility] for consideration of chest tube placement and possible intrapleural lysis for worsening effusion.     Acute metabolic encephalopathy with delirium, multifactorial  Hepatic encephalopathy  Chronic liver disease, history of liver transplant 2016  End-stage renal disease (ESRD), on hemodialysis (HD)  History of seizure, on Keppra and Vimpat  Waxing and waning mentation, coinciding with lactulose being held at [facility]  Earlier in hospital stay, remained confused and had pulled out tracheostomy tube, PICC line and pulled his dialysis catheter.  Lines replaced.  Psychiatry consulted, recent follow-up 2/21, 2/28, see notes.    Mental status continues to fluctuate with periods of alertness and increased sleepiness, overall improving    Complicated, prolonged hospital course with mental status concerns multifactorial related to hepatic encephalopathy, end-stage renal disease on dialysis, past PEA arrest, seizure disorder, medication, and chronic liver disease with prior liver transplant.  As mental status fluctuates will continue to require one-to-one sitter as needed for added safety and supervision.  Reassess daily.    Continue to reorient and redirect as able.   Avoid restraints if possible with goals of safety and close supervision (given multiple lines and tubes - dialysis catheter, PEG tube, IV, etc).    Maintain normal day/night cycles and sleep-wake cycles.  Minimize sedating medications as able.  Remains weak and deconditioned with complex, prolonged hospital course and limited mobility.  Encouragement and support offered daily to patient.    Continue Lactulose to 10 mg BID, monitor for symptoms; goal to have 2-3 bowel movements per day    Ongoing hemodialysis, as per nephrology, 3X/week dialysis schedule    Nephrology consult follow-up appreciated, dialysis patient    Continue ramelteon 8 mg at bedtime; monitor for side effects including AM sedation, reassess daily    Continue olanzapine (Zyprexa) 5 mg bid and as needed; monitor for side effects including sedation    Seizure disorder stable, continue Keppra and Vimpat; monitor, no recurrent seizures reported of recent    Reconsult psychiatry as needed            Bilateral pleural effusions   Acute respiratory failure requiring tracheostomy  - resolved    Pneumonia  - resolved   ARDS  - resolved    Right pneumothorax - resolved   *Initial event was parainfluenza and strep pneumo pneumonia back in November 2022. Treated for ARDS. Had failed extubation x2 and tracheostomy performed on previous hospitalization. *Had additional complications of bilateral pneumothoraces as well as hydropneumothorax- pleural fluid grew candida parapsilosis  *Completed 4-week antifungal treatment. While in LTACH he was successfully weaned from the ventilator.  *Recently there were concern for worsening effusion while at [facility] and had thoracentesis 01/13 which returned exudative without growth on cultures. CT chest/abdomen/pelvis on 01/18 demonstrated worsening effusions and concerns for infected parapneumonic effusions with possible SBP.  Multiple pulmonologist at [facility] reviewed CT scan recommended transfer to [facility] for consideration of  chest tube placement and possible intrapleural lysis  *Thoracic surgery (Dr. Jackson) consulted during admission   *CT chest 1/22, small effusions on imaging and so chest tube was not inserted.   ID consulted, see note 2/10.  *Patient pulled out his tracheostomy tube on the night 2/1-2/2 and is tolerating well without increased shortness of breath persistent cough or worsening hypoxia  * Chest x-ray 2/3 with cardiomegaly, chronic small bilateral pleural effusions, no pulmonary edema; right internal jugular dialysis catheter in place    3/12 Pulmonary consult, see note, concerns of hypercapnia, atelectasis, with consideration of BiPAP trial; no recommendations for antibiotics with infiltrates felt to be atelectasis    Stable, continue to monitor respiratory status, recently stable on room air; occasional cough reported    Encourage incentive spirometry as able, up to chair, deep breathing    Wound care previously consulted for tracheostomy site care, monitor for signs and symptoms of infection, healing well - resolved.      # Splenic infract ?  Wedge shaped area on CT scan chest and CT abdomen  Hematology consulted  Discussed plan with Dr Smith on 3/9 from Hematology/oncology consult appreciated recommended supportive managment  TTE no thrombus or vegetations   No abdominal pain CTM       S/p liver transplant 2016   Chronic immunosuppression, on tacrolimus  Cirrhosis with ascites  Subacute bacterial peritonitis (SBP), resolved  *Main manifestations of this are hepatic encephalopathy and ascites.  Hepatologist at [facility] felt that most likely reason for the cirrhosis was metabolic syndrome or alcohol intake.  There was no evidence of rejection on biopsy and there was some evidence of regeneration. Paracentesis done on 12/22/2022 showed lactobacillus indicating SBP, treated with Unasyn and Augmentin, finished 2-week course 1/12/2023.  Requires large-volume paracentesis periodically for recurring  "ascites.    *Paracentesis 1/13/2023 yielded about 7500 cc. Cultures NGTD. paracentesis on 1/24 with 4.8 L fluid removal  Paracentesis on 3/6/23 No SBP    Continue intermittent paracentesis as needed if develops symptomatic ascites    Monitor for signs and symptoms of recurrent spontaneous bacterial peritonitis     Further treatment for recurrent ascites with TIPS deferred to his Liver Transplant team and/or Hepatology, he has an appointment on 4/21/2023 with Hepatology, Dr. Simms    Continue Tacrolimus 1 mg BID, rifaximin 550 mg BID    Continue lactulose as ordered, titrate as needed to have 2-3 bms    GI consult as needed in hospital for new acute issues, otherwise as outpatient    Encourgae high protein diet      Having bowel movements.  Continue lactulose to have 2-3 bowel movements.  CT abdomen on March 9, 2023 showed small to moderate ascites      Goals of care   As per prior rounding provider, \"on 1/25 nursing had mentioned that patient had refused to undergo dialysis and want to stop care, palliative care was consulted.  Patient and his spouse denied wanting to stop care and wanted ongoing restorative care including full code\"    Monitor, Full Code status    -Needs placement to TCU     Diarrhea, improved, on lactulose for chronic liver disease  - C difficile negative on 1/31. Secondary to lactulose.  - discontinued rectal tube (2/12) as stools more formed    Continue lactulose with goal of 2 to 3 soft bowel movement in 24 hours     Paroxysmal atrial fibrillation  Chronic anticoagulation, apixaban  Remains in sinus rhythm, on anticoagulation with apixaban indefinitely for stroke prophylaxis.      Monitor rhythm    Continue apixaban anticoagulation    Monitor hemoglobin, see below    Apixaban held for fistula placmenet      Acute anemia  Pt has required intermittent transfusions for on-going anemia.  Anemia most likely secondary to critical illness/chronic disease, chronic renal disease.  Baseline hemoglobin " around 7-8  Likely Epo resistance  Hb 8.4 on 3/21    # Hypercalcemia  Josue hypercalcemia of Immobility  Discussed with nephrology on 3/8/23  Hold VIT D  -Continue to hold Phos lo  Repeat Phosphorus levels in 3-4 days   Encourage ambulation   nephrology considering antiresorptive therapy if hypercalcemia becomes more severe  -Check calcium levels tomorrow      History PEA arrest   PEA arrest prior to his previous admission and 1 episode of PEA associated with desaturation on 12/20.  No structural cardiac disease.     Stable, continue cardiac Monitoring     Chronic Hypotension  In the past has developed baseline hypotension with cirrhosis and prolonged critical illness.  On hemodialysis.  Receiving midodrine and albumin during dailysis    Continue midodrine, as per nephrology      History of seizure   After hypoxic event, in the context of baseline multifactorial encephalopathy secondary to his ongoing critical illness and prior cardiac arrests and prior strokes and cirrhosis with hepatic encephalopathy. MRI of head of 12/20/22 reveals no PRES or leptomeningeal enhancement but scattered.  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   No recent seizure activity.    Continue levetiracetam, lacosamide     Seizure precautions    Outpatient follow-up with neurology, consult inpatient if needed     # ESRD  # Anemia due to renal disease  # Hypotension during dialysis    -As per nephrology    Nephrology reviewing vein mapping to assess options for internal access placement for dialysis, see note 3/16    Undergoing Fistula placement today           Diabetes mellitus type 2  *Hemoglobin A1c on November 2022 was 4.7  *By report, on Lantus insulin in the past.  Blood sugar checks and sliding scale insulin previously discontinued as has been stable without insulin needs    Monitor with periodic blood sugar checks as needed      # Hypomagnesemia  Mag replacement      Severe  malnutrition, protein and calorie type  Moderate dysphagia  G-tube feedings    Continue with tube feeds via PEG tube    IR replaced the PEG tube on 2/8/2023, monitor    Nutritionist consulted and following for tube feeds    SLP following as well. Oral diet as per speech and language therapy (SLP)     2/20 Nutrition following for tube feeds     Underwent video swallow on 2/23/22     -Undergoing Prn tube feed  Encourage high-protein diet     Anxiety  Fluoxetine was discontinued as per psychiatry recommendation on 2/2 (see consult note for details)     Stable, monitor; comfort and reassurance offered during visit    Psychiatry follow-up         Diet: Room Service  Snacks/Supplements Adult: Other; Gelatein+ with brkfst and lunch, and magic cup with dinner (RD); With Meals  Adult Formula Bolus Feeding: Novasource Renal; Route: Gastrostomy; 3 times daily; Volume per Bolus: 240; mL(s); Continue to encourage after meal bolus at 80 mL/hr x 3 hrs, If patient consumes <50% of that meal  NPO per Anesthesia Guidelines for Procedure/Surgery Except for: Meds    DVT Prophylaxis: DOAC  Nixon Catheter: Not present  Lines: PRESENT             Cardiac Monitoring: None  Code Status: Full Code      Clinically Significant Risk Factors            # Hypomagnesemia: Lowest Mg = 1.6 mg/dL in last 2 days, will replace as needed   # Hypoalbuminemia: Lowest albumin = 1.9 g/dL at 1/23/2023  6:38 AM, will monitor as appropriate            # Severe Malnutrition: based on nutrition assessment        Disposition Plan      Expected Discharge Date: 03/23/2023, 12:00 PM  Discharge Delays: Placement - LTC  Destination: inpatient rehabilitation facility  Discharge Comments: Dialysis pt MWF. Will need placement TCU/LTC. [facility] willing to accept once no sitter, SW to follow up when sitter free          Ashkan Hernandez MD  Hospitalist Service  [facility]  Securely message with [employer] (more info)  Text page via [email or web address]-DENTIST  Paging/Directory   ______________________________________________________________________    Interval History   Patient seen and examined at bedside.  Discussed with patient's nurse and also with case management.  Patient alert.  Patient impulsive sometimes and wants to get out of the bed.  Discussed with physical therapy patient has shown improvement and walking short distances.  Patient denies any chest pain or shortness of breath.  Feels well.    Physical Exam   Vital Signs: Temp: 97.6  F (36.4  C) Temp src: Oral BP: 107/70 Pulse: 85   Resp: 16 SpO2: 94 % O2 Device: None (Room air)    Weight: 186 lbs 4.62 oz    Physical Exam  Cardiovascular:      Rate and Rhythm: Normal rate and regular rhythm.   Pulmonary:      Effort: Pulmonary effort is normal. No respiratory distress.   Abdominal:      Palpations: Abdomen is soft.      Tenderness: There is no abdominal tenderness.      Comments: Mild distension   Musculoskeletal:      Right lower leg: No edema.      Left lower leg: No edema.   Neurological:      Mental Status: He is alert.       Medical Decision Making     Data     I have personally reviewed the following data over the past 24 hrs:    N/A  \   8.4 (L)   / 134 (L)     N/A N/A N/A /  88   4.5 N/A 4.41 (H) \

## 2023-03-21 NOTE — PROGRESS NOTES
VASCULAR SURGERY    Visited with patient this morning.  Very alert and appropriate.  Discussed plan for left upper arm proximal radial to cephalic fistula.  This was performed under local anesthetic with IV sedation scheduled for noon today.    Patient is on chronic Eliquis.  He did receive his dose yesterday morning but not today.  Since we are working a very superficial portion on the left than arm I do not feel that delaying surgery another 24 to 48 hours is indicated and discussed with patient.    Patient been n.p.o. since midnight except for medications.    Deejay Mcneil MD

## 2023-03-21 NOTE — PROVIDER NOTIFICATION
Pt with hypotension in PACU and trending down. Pt on HD MWF. Notified Dr Burgess, VORB 250mL 12.5 albumin. See flowsheet for data. RN at bedside, CTM.     1530:Dr Mcneil at bedside, fistula assessed. Good pulses, LUE warm.     Update: 1550: Pt alert, upright in bed but refusing assessment currently. Pressures stable.

## 2023-03-21 NOTE — CONSULTS
Received RN consult asking if TF could be used after procedure today, given pt will be NPO most of the day.   - Confirmed TF is OK for use, bolus will use Novasource Renal @ 80 mL/hr x3 hours. This can be done anytime pt eats <50% of his meals. D/w RN. Thanks!    Edna Osuna RD, LD  [facility], Ortho, [facility] Sp[facility]  Pager: [phone]  Weekend Pager: [phone]

## 2023-03-22 ENCOUNTER — APPOINTMENT (OUTPATIENT)
Dept: SPEECH THERAPY | Facility: CLINIC | Age: 58
DRG: 981 | End: 2023-03-22
Attending: STUDENT IN AN ORGANIZED HEALTH CARE EDUCATION/TRAINING PROGRAM
Payer: COMMERCIAL

## 2023-03-22 LAB
ANION GAP SERPL CALCULATED.3IONS-SCNC: 6 MMOL/L (ref 7–15)
BUN SERPL-MCNC: 37.3 MG/DL (ref 6–20)
CALCIUM SERPL-MCNC: 9.3 MG/DL (ref 8.6–10)
CHLORIDE SERPL-SCNC: 101 MMOL/L (ref 98–107)
CREAT SERPL-MCNC: 3.03 MG/DL (ref 0.67–1.17)
DEPRECATED HCO3 PLAS-SCNC: 31 MMOL/L (ref 22–29)
GFR SERPL CREATININE-BSD FRML MDRD: 23 ML/MIN/1.73M2
GLUCOSE BLDC GLUCOMTR-MCNC: 117 MG/DL (ref 70–99)
GLUCOSE SERPL-MCNC: 110 MG/DL (ref 70–99)
MAGNESIUM SERPL-MCNC: 1.5 MG/DL (ref 1.7–2.3)
MAGNESIUM SERPL-MCNC: 2.7 MG/DL (ref 1.7–2.3)
POTASSIUM SERPL-SCNC: 4.1 MMOL/L (ref 3.4–5.3)
SODIUM SERPL-SCNC: 138 MMOL/L (ref 136–145)

## 2023-03-22 PROCEDURE — 80048 BASIC METABOLIC PNL TOTAL CA: CPT | Performed by: STUDENT IN AN ORGANIZED HEALTH CARE EDUCATION/TRAINING PROGRAM

## 2023-03-22 PROCEDURE — 90937 HEMODIALYSIS REPEATED EVAL: CPT

## 2023-03-22 PROCEDURE — 36415 COLL VENOUS BLD VENIPUNCTURE: CPT | Performed by: STUDENT IN AN ORGANIZED HEALTH CARE EDUCATION/TRAINING PROGRAM

## 2023-03-22 PROCEDURE — 250N000013 HC RX MED GY IP 250 OP 250 PS 637: Performed by: STUDENT IN AN ORGANIZED HEALTH CARE EDUCATION/TRAINING PROGRAM

## 2023-03-22 PROCEDURE — 634N000001 HC RX 634: Performed by: STUDENT IN AN ORGANIZED HEALTH CARE EDUCATION/TRAINING PROGRAM

## 2023-03-22 PROCEDURE — 120N000001 HC R&B MED SURG/OB

## 2023-03-22 PROCEDURE — 99024 POSTOP FOLLOW-UP VISIT: CPT

## 2023-03-22 PROCEDURE — 92526 ORAL FUNCTION THERAPY: CPT | Mod: GN

## 2023-03-22 PROCEDURE — 250N000011 HC RX IP 250 OP 636: Performed by: STUDENT IN AN ORGANIZED HEALTH CARE EDUCATION/TRAINING PROGRAM

## 2023-03-22 PROCEDURE — 99232 SBSQ HOSP IP/OBS MODERATE 35: CPT | Performed by: STUDENT IN AN ORGANIZED HEALTH CARE EDUCATION/TRAINING PROGRAM

## 2023-03-22 PROCEDURE — 250N000012 HC RX MED GY IP 250 OP 636 PS 637: Performed by: STUDENT IN AN ORGANIZED HEALTH CARE EDUCATION/TRAINING PROGRAM

## 2023-03-22 PROCEDURE — 90935 HEMODIALYSIS ONE EVALUATION: CPT | Performed by: STUDENT IN AN ORGANIZED HEALTH CARE EDUCATION/TRAINING PROGRAM

## 2023-03-22 PROCEDURE — 83735 ASSAY OF MAGNESIUM: CPT | Performed by: STUDENT IN AN ORGANIZED HEALTH CARE EDUCATION/TRAINING PROGRAM

## 2023-03-22 PROCEDURE — 250N000013 HC RX MED GY IP 250 OP 250 PS 637: Performed by: INTERNAL MEDICINE

## 2023-03-22 RX ORDER — MAGNESIUM SULFATE HEPTAHYDRATE 40 MG/ML
4 INJECTION, SOLUTION INTRAVENOUS ONCE
Status: COMPLETED | OUTPATIENT
Start: 2023-03-22 | End: 2023-03-22

## 2023-03-22 RX ADMIN — GUAIFENESIN 600 MG: 600 TABLET, EXTENDED RELEASE ORAL at 07:36

## 2023-03-22 RX ADMIN — ACETAMINOPHEN 650 MG: 325 TABLET, FILM COATED ORAL at 21:15

## 2023-03-22 RX ADMIN — RAMELTEON 8 MG: 8 TABLET, FILM COATED ORAL at 19:19

## 2023-03-22 RX ADMIN — MIDODRINE HYDROCHLORIDE 10 MG: 5 TABLET ORAL at 19:19

## 2023-03-22 RX ADMIN — OLANZAPINE 5 MG: 5 TABLET, ORALLY DISINTEGRATING ORAL at 07:35

## 2023-03-22 RX ADMIN — OLANZAPINE 5 MG: 5 TABLET, ORALLY DISINTEGRATING ORAL at 21:14

## 2023-03-22 RX ADMIN — FOLIC ACID 1 MG: 1 TABLET ORAL at 11:27

## 2023-03-22 RX ADMIN — ACETAMINOPHEN 650 MG: 325 TABLET, FILM COATED ORAL at 11:26

## 2023-03-22 RX ADMIN — RIFAXIMIN 550 MG: 550 TABLET ORAL at 07:36

## 2023-03-22 RX ADMIN — MIDODRINE HYDROCHLORIDE 10 MG: 5 TABLET ORAL at 07:35

## 2023-03-22 RX ADMIN — ACETAMINOPHEN 650 MG: 325 TABLET, FILM COATED ORAL at 01:06

## 2023-03-22 RX ADMIN — MIDODRINE HYDROCHLORIDE 10 MG: 5 TABLET ORAL at 11:27

## 2023-03-22 RX ADMIN — LACOSAMIDE 100 MG: 100 TABLET, FILM COATED ORAL at 01:05

## 2023-03-22 RX ADMIN — LACTULOSE 10 G: 10 SOLUTION ORAL at 11:28

## 2023-03-22 RX ADMIN — APIXABAN 5 MG: 5 TABLET, FILM COATED ORAL at 21:14

## 2023-03-22 RX ADMIN — PANTOPRAZOLE SODIUM 40 MG: 40 TABLET, DELAYED RELEASE ORAL at 07:36

## 2023-03-22 RX ADMIN — TACROLIMUS 1 MG: 1 CAPSULE ORAL at 21:14

## 2023-03-22 RX ADMIN — LACOSAMIDE 100 MG: 100 TABLET, FILM COATED ORAL at 11:27

## 2023-03-22 RX ADMIN — MELATONIN TAB 3 MG 6 MG: 3 TAB at 21:15

## 2023-03-22 RX ADMIN — TACROLIMUS 1 MG: 1 CAPSULE ORAL at 07:35

## 2023-03-22 RX ADMIN — MAGNESIUM SULFATE HEPTAHYDRATE 4 G: 40 INJECTION, SOLUTION INTRAVENOUS at 15:11

## 2023-03-22 RX ADMIN — EPOETIN ALFA-EPBX 20000 UNITS: 10000 INJECTION, SOLUTION INTRAVENOUS; SUBCUTANEOUS at 10:27

## 2023-03-22 RX ADMIN — WHITE PETROLATUM: 1.75 OINTMENT TOPICAL at 11:28

## 2023-03-22 RX ADMIN — LACTULOSE 10 G: 10 SOLUTION ORAL at 21:14

## 2023-03-22 RX ADMIN — LEVETIRACETAM 1000 MG: 500 TABLET, FILM COATED ORAL at 21:14

## 2023-03-22 RX ADMIN — Medication 1 CAPSULE: at 11:27

## 2023-03-22 RX ADMIN — RIFAXIMIN 550 MG: 550 TABLET ORAL at 21:14

## 2023-03-22 RX ADMIN — GUAIFENESIN 600 MG: 600 TABLET, EXTENDED RELEASE ORAL at 21:14

## 2023-03-22 ASSESSMENT — ACTIVITIES OF DAILY LIVING (ADL)
ADLS_ACUITY_SCORE: 55
ADLS_ACUITY_SCORE: 54
ADLS_ACUITY_SCORE: 55

## 2023-03-22 NOTE — PLAN OF CARE
Goal Outcome Evaluation:     DATE & TIME: 3/21/23 [id number]  Cognitive Concerns/ Orientation : A&O x 2; forgetful to time; appears when he is fatigued he is less oriented.  BEHAVIOR & AGGRESSION TOOL COLOR: Green         ABNL VS/O2: VSS on RA  MOBILITY: Ax1 GB/W; up in chair x 1 and commode; impulsive, forgets to call  PAIN MANAGMENT: denies, refused Lidocaine patch  DIET: Regular diet with mildly thickened liquids. Good appetite  BOWEL/BLADDER:   Hemodialysis on M-W-F; BM goal is 2-3 in 24 hr; had 2 medium BM  ABNL LAB/BG: creat 4.41, Hgb 8.4, Platelet 134  DRAIN/DEVICES: R chest CVC, double lumen for hemodialysis; R PIV saline locked; PEG tube, clamped  SKIN: Scattered bruising; L elbow dried scab; PROCEDURES:  LFA fistula placed today  D/C DATE: Pending placement to TCU  OTHER IMPORTANT INFO:

## 2023-03-22 NOTE — PROGRESS NOTES
Potassium   Date Value Ref Range Status   03/21/2023 4.5 3.4 - 5.3 mmol/L Final   12/29/2022 3.4 3.4 - 5.3 mmol/L Final   04/20/2020 3.9 3.4 - 5.3 mmol/L Final     Potassium POCT   Date Value Ref Range Status   01/19/2023 3.6 3.5 - 5.0 mmol/L Final     Hemoglobin   Date Value Ref Range Status   03/21/2023 8.4 (L) 13.3 - 17.7 g/dL Final   04/20/2020 14.3 13.3 - 17.7 g/dL Final     Creatinine   Date Value Ref Range Status   03/21/2023 4.41 (H) 0.67 - 1.17 mg/dL Final   04/20/2020 1.06 0.66 - 1.25 mg/dL Final     Urea Nitrogen   Date Value Ref Range Status   03/17/2023 37.6 (H) 6.0 - 20.0 mg/dL Final   12/29/2022 46 (H) 7 - 30 mg/dL Final   04/20/2020 23 7 - 30 mg/dL Final     Sodium   Date Value Ref Range Status   03/17/2023 135 (L) 136 - 145 mmol/L Final   04/20/2020 139 133 - 144 mmol/L Final     INR   Date Value Ref Range Status   12/21/2022 1.36 (H) 0.85 - 1.15 Final   10/07/2019 1.20 (H) 0.86 - 1.14 Final       DIALYSIS PROCEDURE NOTE  Hepatitis status of previous patient on machine log was checked and verified ok to use with this patients hepatitis status.  Patient dialyzed for 3hrs. on a K3 bath with a net fluid removal of 1.5L.  A BFR of 350ml/min was obtained via a Right internal jugular CVC. The treatment plan was discussed with Dr. Laboy during the treatment.    Total heparin received during the treatment: 0units.     Line flushed, clamped and capped with heparin 1:1000 1.9mL (1900units) per lumen    Meds given: 20,000units epoetin and midodrine given pre treatment on home unit.   Complications: None      Person educated: patient. Knowledge base minimal. Barriers to learning: confusion. Educated on procedure & access care via verbal mode. Patient verbalized understanding needs reinforcement. Pt prefers verbal education style.       ICEBOAT? Timeout performed pre-treatment  I: Patient was identified using 2 identifiers  C:  Consent Signed Yes  E: Equipment preventative maintenance is current and  dialysis delivery system OK to use  B: Hepatitis B Surface Antigen: Negative; Draw Date: 03/9/23      Hepatitis B Surface Antibody:Susceptible; Draw Date: 03/9/23  O: Dialysis orders present and complete prior to treatment  A: Vascular access verified and assessed prior to treatment  T: Treatment was performed at a clinically appropriate time  ?: Patient was allowed to ask questions and address concerns prior to treatment  See Adult Hemodialysis flowsheet in [facility] for further details and post assessment.  Machine water alarm in place and functioning. Transducer pods intact and checked every 15min.   Pt returned via bed transport.  Chlorine/Chloramine water system checked every 4 hours.  Outpatient Dialysis TBD on MWF  Patient repositioned every 2 hours during the treatment.  Post treatment report given to AUSTIN Gaffney RN regarding 1.5L of fluid removed, last BP of 95/61, and patient pain rating of 4/10 to lower back.

## 2023-03-22 NOTE — PROVIDER NOTIFICATION
MD Notification    Notified Person: MD    Notified Person Name: John Cm APRABUNDIO CNP    Notification Date/Time: 3/22/23 0207    Notification Interaction: [employer]    Purpose of Notification: Pt had a witnessed fall by the sitter. He was continuously throwing his legs out of bed and ended up sliding out of bed. Can you come take a look please?    Orders Received: Continue fall precautions    Comments:

## 2023-03-22 NOTE — PROGRESS NOTES
" Renal Medicine Progress Note            Assessment/Plan:     Assessment:  1) ESRD on MWF chronic dialysis via right tunneled CVC    Blood pressure, hemodynamics/BP's appear good. Tolerated 1.5kg UF last few runs.    Seen by [facility] surgery .  S/p L arm AVF 3/21/23.      2) Hypercalcemia, secondary to immobalization. Noted high bone turnover state, suppressed PTH.  Plan for anti-resorptive (denosumab or zoledronic acid) if gets more severe (>12.0 corrected calcium)     3) Anemia:    Somewhat epo resistant. Getting retacrit 10,000 units most runs . Adequate iron stores with ferritin 252 and sats 26%.        Plan/Recs:  1) HD today, 1.5L UF, tolerated well   2) EPO with HD  3) s/p AVF      Dwayne Laboy MD   [facility] - Nephrology   [phone]          Interval History:     - confusion and delirium overnight requiring 1:1. Did have a \"fall\" but didn't hit head or have LOC, more that he threw legs over bed and slid to ground as someone caught him   - seen during dialysis   - appears encephalopathic this AM   - denies n/v   - reports back pain and itching, but otherwise no complaitns   - denies SOB   - tolerating UF today             Medications and Allergies:       - MEDICATION INSTRUCTIONS for Dialysis Patients -   Does not apply See Admin Instructions     apixaban ANTICOAGULANT  5 mg Oral BID     folic acid  1 mg Oral or Feeding Tube Daily     guaiFENesin  600 mg Oral BID     lacosamide  100 mg Oral Q12H     lactulose  10 g Oral BID     levETIRAcetam  1,000 mg Oral Q24H     lidocaine  1 patch Transdermal Q24H     lidocaine   Transdermal Q8H BIJU     midodrine  10 mg Oral or Feeding Tube TID w/meals     mineral oil-hydrophilic petrolatum   Topical Daily     multivitamin RENAL  1 capsule Oral Daily     [Held by provider] nystatin  500,000 Units Swish & Spit 4x Daily     OLANZapine zydis  5 mg Oral BID     pantoprazole  40 mg Oral QAM AC     ramelteon  8 mg Oral QPM     rifaximin  550 mg Oral or Feeding " Tube BID     sodium chloride (PF)  3 mL Intracatheter Q8H     tacrolimus  1 mg Oral Q12H      No Known Allergies         Physical Exam:   Vitals were reviewed  BP 98/63 (BP Location: Right arm, Patient Position: Semi-Salgado's)   Pulse 81   Temp 98.2  F (36.8  C) (Oral)   Resp 27   Wt 80.4 kg (177 lb 4 oz)   SpO2 95%   BMI 24.04 kg/m      Wt Readings from Last 3 Encounters:   03/22/23 80.4 kg (177 lb 4 oz)   01/21/23 82.4 kg (181 lb 11.2 oz)   12/28/22 96 kg (211 lb 10.3 oz)       GENERAL APPEARANCE:awake, alert, NAD  RESP: lungs clear ant/lat.  CV: RRR, nl S1/S2, no m/r/g   ABDOMEN: distended but soft and nontender  EXTREMITIES/SKIN: no c/c/rashes/lesions; no edema  LINES: right CVC present, no visible abnormalities.   ACCESS: L AVF, palpable thrill (very soft), audible bruit. L radial pulse intact.               Data:     BMP  Recent Labs   Lab 03/22/23  0642 03/21/23  0825 03/17/23  1308   NA  --   --  135*   POTASSIUM  --  4.5 4.2   CHLORIDE  --   --  97*   KELLY  --   --  10.3*   CO2  --   --  28   BUN  --   --  37.6*   CR  --  4.41* 2.85*   * 88 92     CBC  Recent Labs   Lab 03/21/23  0825   HGB 8.4*   *     Lab Results   Component Value Date    AST 20 03/14/2023    ALT 8 (L) 03/14/2023    ALKPHOS 177 (H) 03/14/2023    BILITOTAL 0.4 03/14/2023    CHANDLER 64 (H) 03/12/2023     Lab Results   Component Value Date    INR 1.36 (H) 12/21/2022       Attestation:  I have reviewed today's vital signs, notes, medications, labs and imaging.    Dwayne Laboy MD  [facility] - Nephrology

## 2023-03-22 NOTE — PROGRESS NOTES
[facility] brief note:    Witnessed, assisted mechanical fall without obvious injury.    Paged by nursing at 0207 noting pt had witnessed fall, requesting bedside evaluation.  Upon arrival, pt lying in bed, resting, in no overt distress, with bedside attendant present at pt's bedside.  Nursing notes pt had been kicking his BLE out of the bed when he started to slide out of bed.  Pt's bilateral knees struck the ground and his RUE was hanging onto the bed rail.  In order for nursing to place pt in sling to lift pt back to bed, pt was assisted to lying position on the floor.  Pt recalls sliding out of bed.  Pt reports no acute pain.  Pt did not strike head during the fall.  Pt's bilateral knees and RUE without obvious injury.      Interventions:  - Maintain fall precautions at all times    STEFFANY Valdez, CNP  [facility]    No charge.

## 2023-03-22 NOTE — PROGRESS NOTES
[facility]    Medicine Progress Note - Hospitalist Service    Date of Admission:  1/21/2023    Assessment & Plan   Blanco Osborne is a 57 year-old male admitted on 1/21/2023 as a transfer from [facility] for evaluation of loculated pleural effusion. He has extensive PMH including h/o liver transplant 2016 due to alcohol abuse, pAF on chronic anticoagulation, history of diabetes mellitus type 2, CVA, hypertension, CHRISTIAN on BiPAP.  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 He was recovering in [facility] and was transferred to [facility] for consideration of chest tube placement and possible intrapleural lysis for worsening effusion.     Acute metabolic encephalopathy with delirium, multifactorial Improving  Hepatic encephalopathy  Chronic liver disease, history of liver transplant 2016  End-stage renal disease (ESRD), on hemodialysis (HD)  History of seizure, on Keppra and Vimpat  Waxing and waning mentation, coinciding with lactulose being held at [facility]  Earlier in hospital stay, remained confused and had pulled out tracheostomy tube, PICC line and pulled his dialysis catheter.  Lines replaced.  Psychiatry consulted, recent follow-up 2/21, 2/28, see notes.    Mental status continues to fluctuate with periods of alertness and increased sleepiness, overall improving    Complicated, prolonged hospital course with mental status concerns multifactorial related to hepatic encephalopathy, end-stage renal disease on dialysis, past PEA arrest, seizure disorder, medication, and chronic liver disease with prior liver transplant.  As mental status fluctuates will continue to require one-to-one sitter as needed for added safety and supervision.  Reassess daily.    Continue to reorient and redirect  as able.  Avoid restraints if possible with goals of safety and close supervision (given multiple lines and tubes - dialysis catheter, PEG tube, IV, etc).    Maintain normal day/night cycles and sleep-wake cycles.  Minimize sedating medications as able.  Remains weak and deconditioned with complex, prolonged hospital course and limited mobility.  Encouragement and support offered daily to patient.    Continue Lactulose to 10 mg BID, monitor for symptoms; goal to have 2-3 bowel movements per day    Ongoing hemodialysis, as per nephrology, 3X/week dialysis schedule    Nephrology consult follow-up appreciated, dialysis patient    Continue ramelteon 8 mg at bedtime; monitor for side effects including AM sedation, reassess daily    Continue olanzapine (Zyprexa) 5 mg bid and as needed; monitor for side effects including sedation    Seizure disorder stable, continue Keppra and Vimpat; monitor, no recurrent seizures reported of recent    Reconsult psychiatry as needed              Bilateral pleural effusions   Acute respiratory failure requiring tracheostomy  - resolved    Pneumonia  - resolved   ARDS  - resolved    Right pneumothorax - resolved   *Initial event was parainfluenza and strep pneumo pneumonia back in November 2022. Treated for ARDS. Had failed extubation x2 and tracheostomy performed on previous hospitalization. *Had additional complications of bilateral pneumothoraces as well as hydropneumothorax- pleural fluid grew candida parapsilosis  *Completed 4-week antifungal treatment. While in LTACH he was successfully weaned from the ventilator.  *Recently there were concern for worsening effusion while at [facility] and had thoracentesis 01/13 which returned exudative without growth on cultures. CT chest/abdomen/pelvis on 01/18 demonstrated worsening effusions and concerns for infected parapneumonic effusions with possible SBP.  Multiple pulmonologist at [facility] reviewed CT scan recommended transfer to [facility] for  consideration of chest tube placement and possible intrapleural lysis  *Thoracic surgery (Dr. Jackson) consulted during admission   *CT chest 1/22, small effusions on imaging and so chest tube was not inserted.   ID consulted, see note 2/10.  *Patient pulled out his tracheostomy tube on the night 2/1-2/2 and is tolerating well without increased shortness of breath persistent cough or worsening hypoxia  * Chest x-ray 2/3 with cardiomegaly, chronic small bilateral pleural effusions, no pulmonary edema; right internal jugular dialysis catheter in place    3/12 Pulmonary consult, see note, concerns of hypercapnia, atelectasis, with consideration of BiPAP trial; no recommendations for antibiotics with infiltrates felt to be atelectasis    Stable, continue to monitor respiratory status, recently stable on room air; occasional cough reported    Encourage incentive spirometry as able, up to chair, deep breathing    Wound care previously consulted for tracheostomy site care, monitor for signs and symptoms of infection, healing well - resolved.    Encourage ambulation      # Splenic infract ?  Wedge shaped area on CT scan chest and CT abdomen  Hematology consulted  Discussed plan with Dr Smith on 3/9 from Hematology/oncology consult appreciated recommended supportive managment  TTE no thrombus or vegetations   No abdominal pain CTM       S/p liver transplant 2016   Chronic immunosuppression, on tacrolimus  Cirrhosis with ascites  Subacute bacterial peritonitis (SBP), resolved  *Main manifestations of this are hepatic encephalopathy and ascites.  Hepatologist at [facility] felt that most likely reason for the cirrhosis was metabolic syndrome or alcohol intake.  There was no evidence of rejection on biopsy and there was some evidence of regeneration. Paracentesis done on 12/22/2022 showed lactobacillus indicating SBP, treated with Unasyn and Augmentin, finished 2-week course 1/12/2023.  Requires large-volume paracentesis  "periodically for recurring ascites.    *Paracentesis 1/13/2023 yielded about 7500 cc. Cultures NGTD. paracentesis on 1/24 with 4.8 L fluid removal  Paracentesis on 3/6/23 No SBP    Continue intermittent paracentesis as needed if develops symptomatic ascites    Monitor for signs and symptoms of recurrent spontaneous bacterial peritonitis     Further treatment for recurrent ascites with TIPS deferred to his Liver Transplant team and/or Hepatology, he has an appointment on 4/21/2023 with Hepatology, Dr. Simms    Continue Tacrolimus 1 mg BID, rifaximin 550 mg BID    Continue lactulose as ordered, titrate as needed to have 2-3 bms    GI consult as needed in hospital for new acute issues, otherwise as outpatient    Encourgae high protein diet      Having bowel movements.  Continue lactulose to have 2-3 bowel movements.  CT abdomen on March 9, 2023 showed small to moderate ascites      Goals of care   As per prior rounding provider, \"on 1/25 nursing had mentioned that patient had refused to undergo dialysis and want to stop care, palliative care was consulted.  Patient and his spouse denied wanting to stop care and wanted ongoing restorative care including full code\"    Monitor, Full Code status    -Needs placement to TCU     Diarrhea, improved, on lactulose for chronic liver disease  - C difficile negative on 1/31. Secondary to lactulose.  - discontinued rectal tube (2/12) as stools more formed    Continue lactulose with goal of 2 to 3 soft bowel movement in 24 hours     Paroxysmal atrial fibrillation  Chronic anticoagulation, apixaban  Remains in sinus rhythm, on anticoagulation with apixaban indefinitely for stroke prophylaxis.      Monitor rhythm    Continue apixaban anticoagulation    Monitor hemoglobin, see below    Apixaban held for fistula placmenet      Acute anemia  Pt has required intermittent transfusions for on-going anemia.  Anemia most likely secondary to critical illness/chronic disease, chronic renal " disease.  Baseline hemoglobin around 7-8  Likely Epo resistance  Hb 8.4 on 3/21    # Hypercalcemia  Dioniciohi hypercalcemia of Immobility  Discussed with nephrology on 3/8/23  Hold VIT D  -Continue to hold Phos lo  Repeat Phosphorus levels in 3-4 days   Encourage ambulation   nephrology considering antiresorptive therapy if hypercalcemia becomes more severe  -Calcium lvel 9.3 on 3/22      History PEA arrest   PEA arrest prior to his previous admission and 1 episode of PEA associated with desaturation on 12/20.  No structural cardiac disease.     Stable, continue cardiac Monitoring     Chronic Hypotension  In the past has developed baseline hypotension with cirrhosis and prolonged critical illness.  On hemodialysis.  Receiving midodrine and albumin during dailysis    Continue midodrine, as per nephrology      History of seizure   After hypoxic event, in the context of baseline multifactorial encephalopathy secondary to his ongoing critical illness and prior cardiac arrests and prior strokes and cirrhosis with hepatic encephalopathy. MRI of head of 12/20/22 reveals no PRES or leptomeningeal enhancement but scattered.  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   No recent seizure activity.    Continue levetiracetam, lacosamide     Seizure precautions    Outpatient follow-up with neurology, consult inpatient if needed     # ESRD  # Anemia due to renal disease  # Hypotension during dialysis    -As per nephrology    Nephrology reviewing vein mapping to assess options for internal access placement for dialysis, see note 3/16  Underwent Fistula placement on  3/21/22        Diabetes mellitus type 2  *Hemoglobin A1c on November 2022 was 4.7  *By report, on Lantus insulin in the past.  Blood sugar checks and sliding scale insulin previously discontinued as has been stable without insulin needs    Monitor with periodic blood sugar checks as needed      # Hypomagnesemia  Mag  replacement   Mag 1.5 today      Severe malnutrition, protein and calorie type  Moderate dysphagia  G-tube feedings    Continue with tube feeds via PEG tube    IR replaced the PEG tube on 2/8/2023, monitor    Nutritionist consulted and following for tube feeds    SLP following as well. Oral diet as per speech and language therapy (SLP)     2/20 Nutrition following for tube feeds     Underwent video swallow on 2/23/22     -Undergoing Prn tube feed  Encourage high-protein diet     Anxiety  Fluoxetine was discontinued as per psychiatry recommendation on 2/2 (see consult note for details)     Stable, monitor; comfort and reassurance offered during visit    Psychiatry follow-up         Diet: Room Service  Snacks/Supplements Adult: Other; Gelatein+ with brkfst and lunch, and magic cup with dinner (RD); With Meals  Adult Formula Bolus Feeding: Novasource Renal; Route: Gastrostomy; 3 times daily; Volume per Bolus: 240; mL(s); Continue to encourage after meal bolus at 80 mL/hr x 3 hrs, If patient consumes <50% of that meal  Combination Diet Regular Diet; Mildly Thick (level 2) (OK for fresh fruit (e.g., pineapple) per SLP)    DVT Prophylaxis: DOAC  Nixon Catheter: Not present  Lines: PRESENT      CVC Double Lumen Right External jugular Tunneled-Site Assessment: WDL      Cardiac Monitoring: None  Code Status: Full Code      Clinically Significant Risk Factors            # Hypomagnesemia: Lowest Mg = 1.5 mg/dL in last 2 days, will replace as needed   # Hypoalbuminemia: Lowest albumin = 1.9 g/dL at 1/23/2023  6:38 AM, will monitor as appropriate            # Severe Malnutrition: based on nutrition assessment        # Family -       Disposition Plan      Expected Discharge Date: 03/23/2023, 12:00 PM  Discharge Delays: Placement - LTC  Destination: inpatient rehabilitation facility  Discharge Comments: Dialysis pt MWF. Will need placement TCU/LTC. [facility] willing to accept once no sitter, SW to follow up when sitter free           Ashkan Hernandez MD  Hospitalist Service  [facility]  Securely message with [employer] (more info)  Text page via [employer] Paging/Directory   ______________________________________________________________________    Interval History   Patient seen and examined at bedside  Family including wife Ofe and son Dylan present at bedside.  Patient has been ambulating well.  His mentation has been improving.  Still impulsive and sitter present at bedside      Physical Exam   Vital Signs: Temp: 98.2  F (36.8  C) Temp src: Oral BP: 98/63 Pulse: 81   Resp: 27 SpO2: 95 % O2 Device: None (Room air)    Weight: 177 lbs 4 oz    Physical Exam  Cardiovascular:      Rate and Rhythm: Normal rate and regular rhythm.   Pulmonary:      Effort: Pulmonary effort is normal. No respiratory distress.   Abdominal:      Palpations: Abdomen is soft.      Tenderness: There is no abdominal tenderness.      Comments: Mild distension   Musculoskeletal:      Right lower leg: No edema.      Left lower leg: No edema.   Neurological:      Mental Status: He is alert.       Medical Decision Making     Data     I have personally reviewed the following data over the past 24 hrs:    N/A  \   N/A   / N/A     138 101 37.3 (H) /  110 (H)   4.1 31 (H) 3.03 (H) \

## 2023-03-22 NOTE — PROGRESS NOTES
VASCULAR SURGERY PROGRESS NOTE    Subjective:  Patient resting in bed. Requiring sitter, alert and appropriate, however impulsive. Patient denies pain. No edema noted. Patient has no complaints.     Objective:  Intake/Output Summary (Last 24 hours) at 3/22/2023 0753  Last data filed at 3/21/2023 1804  Gross per 24 hour   Intake 890 ml   Output 10 ml   Net 880 ml     PHYSICAL EXAM:  /72 (BP Location: Right arm, Patient Position: Semi-Salgado's)   Pulse 87   Temp 97.3  F (36.3  C) (Axillary)   Resp 20   Wt 80.4 kg (177 lb 4 oz)   SpO2 96%   BMI 24.04 kg/m    General: The patient is alert and oriented. Appropriate. No acute distress  Psych: pleasant affect, answers questions appropriately  Skin: Color appropriate for race, warm, dry.  Respiratory: The patient does not require supplemental oxygen. Breathing unlabored  GI:  Abdomen soft, nontender to light palpation.  Extremities: Excellent thrill noted of the fistula site. Incision closed with sutures-intact. 2+ radial pulses noted, no edema noted in the L arm.      ASSESSMENT:  Mr. Blanco Osborne is a 57 year old male who underwent a left upper arm proximal radial to cephalic fistula creation on 3/21/2023 for ESRD.      PLAN:  -patient can restart his Eliquis tonight, 3/22  -no BP, lab draws on the left arm, limb alert bracelet placed  -continue using tunneled catheter until cleared by Vascular Surgery  -appointment scheduled for follow-up with our service  -regular diet    Discussed pt history, exam, assessment and plan with Dr. Mcneil of the vascular surgery service, who is in agreement with the above.    Saima Coker NP  VASCULAR SURGERY     STAFF: Doing very well this morning.  Mild incisional discomfort.   Good pulse and thrill to fistula.   Surgical sites closed with nylon sutures--removal                 In office by myself in 2 weeks   Will initiate fistula tourniquet exercises at that time.   May restart Eliquis this evening      Deejay Mandel  MD [place]

## 2023-03-22 NOTE — PLAN OF CARE
Goal Outcome Evaluation:     DATE & TIME: 3/22/23 6714-2884  Cognitive Concerns/ Orientation : A&O x 1-2 this shift and argumentative about having to have dialysis and his diet restriction of mildly thick liquids;   BEHAVIOR & AGGRESSION TOOL COLOR: Green         ABNL VS/O2: VSS on RA  MOBILITY: Ax2 GB/walker; impulsive, sitter at bedside; walked in room and up in chair this afternoon - he had some difficulty arising, bed raised and was able with assist - may require lift on returning.  10 lb weight bear limit on L arm  PAIN MANAGMENT: chronic low back pain; declines lidoderm patch; tylenol given with relief  DIET: Regular diet with mildly thickened liquids. Fair appetite but needed encouragement  BOWEL/BLADDER:   Hemodialysis today; on lactulose - BM goal is 2-3 in 24 hr; had 1 large stool this afternoon  ABNL LAB/BG: creat 3.03; mag 1.5 - ordered dose per replacement.  DRAIN/DEVICES: R chest CVC, double lumen for hemodialysis; R PIV saline locked; PEG tube flushed and clamped. L fistula placed 3/21/23 with strong bruit/thrill  SKIN: Scattered bruising; L elbow dried scab;  PROCEDURES:  dialysis  D/C DATE: Pending placement to TCU  OTHER IMPORTANT INFO:

## 2023-03-22 NOTE — PLAN OF CARE
Goal Outcome Evaluation:       DATE & TIME: 3/21/23 7804-7955  Cognitive Concerns/ Orientation : A&O x 1-2; forgetful to time; appears when he is fatigued he is less oriented.  BEHAVIOR & AGGRESSION TOOL COLOR: Green         ABNL VS/O2: VSS on RA  MOBILITY: Ax2 sera steady up to commode; impulsive, sitter at bedside. Up to commode x2 this shift.  PAIN MANAGMENT: 8/10 back pain reported, PRN tylenol given x1  DIET: Regular diet with mildly thickened liquids. Good appetite  BOWEL/BLADDER:   Hemodialysis on M-W-F; BM goal is 2-3 in 24 hr; had 1 small BM this shift. Passing gas.   ABNL LAB/BG: creat 4.41, Hgb 8.4, GFR 15  DRAIN/DEVICES: R chest CVC, double lumen for hemodialysis; R PIV saline locked; PEG tube, clamped. L fistula placed 3/21/23 bandage CDI  SKIN: Scattered bruising; L elbow dried scab;  PROCEDURES:  none  D/C DATE: Pending placement to TCU  OTHER IMPORTANT INFO: Per sitter report, pt was continuously throwing BLE out of bed and sitter was repositioning him. Pt then did it again and slide out of bed. Sitter caught him mid way but pt's knees hit the ground and R arm was grabbing on to railing. Staff assisted Pt to the ground and placed a sling under neath him to return pt to bed via ceiling lift. House HARRY was paged and assessed pt- no injuries noted, pt did not hit head or report injuries either. See notes for more info.

## 2023-03-23 ENCOUNTER — APPOINTMENT (OUTPATIENT)
Dept: PHYSICAL THERAPY | Facility: CLINIC | Age: 58
DRG: 981 | End: 2023-03-23
Attending: STUDENT IN AN ORGANIZED HEALTH CARE EDUCATION/TRAINING PROGRAM
Payer: COMMERCIAL

## 2023-03-23 ENCOUNTER — APPOINTMENT (OUTPATIENT)
Dept: SPEECH THERAPY | Facility: CLINIC | Age: 58
DRG: 981 | End: 2023-03-23
Attending: STUDENT IN AN ORGANIZED HEALTH CARE EDUCATION/TRAINING PROGRAM
Payer: COMMERCIAL

## 2023-03-23 LAB
ANION GAP SERPL CALCULATED.3IONS-SCNC: 10 MMOL/L (ref 7–15)
BUN SERPL-MCNC: 62.7 MG/DL (ref 6–20)
CALCIUM SERPL-MCNC: 10.6 MG/DL (ref 8.6–10)
CHLORIDE SERPL-SCNC: 99 MMOL/L (ref 98–107)
CREAT SERPL-MCNC: 4.17 MG/DL (ref 0.67–1.17)
DEPRECATED HCO3 PLAS-SCNC: 27 MMOL/L (ref 22–29)
GFR SERPL CREATININE-BSD FRML MDRD: 16 ML/MIN/1.73M2
GLUCOSE BLDC GLUCOMTR-MCNC: 82 MG/DL (ref 70–99)
GLUCOSE BLDC GLUCOMTR-MCNC: 86 MG/DL (ref 70–99)
GLUCOSE SERPL-MCNC: 125 MG/DL (ref 70–99)
MAGNESIUM SERPL-MCNC: 2.6 MG/DL (ref 1.7–2.3)
POTASSIUM SERPL-SCNC: 4.7 MMOL/L (ref 3.4–5.3)
SODIUM SERPL-SCNC: 136 MMOL/L (ref 136–145)

## 2023-03-23 PROCEDURE — 97116 GAIT TRAINING THERAPY: CPT | Mod: GP

## 2023-03-23 PROCEDURE — 92526 ORAL FUNCTION THERAPY: CPT | Mod: GN

## 2023-03-23 PROCEDURE — 97530 THERAPEUTIC ACTIVITIES: CPT | Mod: GP

## 2023-03-23 PROCEDURE — 250N000013 HC RX MED GY IP 250 OP 250 PS 637: Performed by: STUDENT IN AN ORGANIZED HEALTH CARE EDUCATION/TRAINING PROGRAM

## 2023-03-23 PROCEDURE — 36415 COLL VENOUS BLD VENIPUNCTURE: CPT | Performed by: STUDENT IN AN ORGANIZED HEALTH CARE EDUCATION/TRAINING PROGRAM

## 2023-03-23 PROCEDURE — 83735 ASSAY OF MAGNESIUM: CPT | Performed by: STUDENT IN AN ORGANIZED HEALTH CARE EDUCATION/TRAINING PROGRAM

## 2023-03-23 PROCEDURE — 99232 SBSQ HOSP IP/OBS MODERATE 35: CPT | Performed by: STUDENT IN AN ORGANIZED HEALTH CARE EDUCATION/TRAINING PROGRAM

## 2023-03-23 PROCEDURE — 80048 BASIC METABOLIC PNL TOTAL CA: CPT | Performed by: STUDENT IN AN ORGANIZED HEALTH CARE EDUCATION/TRAINING PROGRAM

## 2023-03-23 PROCEDURE — 99024 POSTOP FOLLOW-UP VISIT: CPT

## 2023-03-23 PROCEDURE — 250N000012 HC RX MED GY IP 250 OP 636 PS 637: Performed by: STUDENT IN AN ORGANIZED HEALTH CARE EDUCATION/TRAINING PROGRAM

## 2023-03-23 PROCEDURE — 120N000001 HC R&B MED SURG/OB

## 2023-03-23 RX ADMIN — LACOSAMIDE 100 MG: 100 TABLET, FILM COATED ORAL at 01:01

## 2023-03-23 RX ADMIN — GUAIFENESIN 600 MG: 600 TABLET, EXTENDED RELEASE ORAL at 20:53

## 2023-03-23 RX ADMIN — OLANZAPINE 5 MG: 5 TABLET, ORALLY DISINTEGRATING ORAL at 02:42

## 2023-03-23 RX ADMIN — ACETAMINOPHEN 650 MG: 325 TABLET, FILM COATED ORAL at 16:29

## 2023-03-23 RX ADMIN — ACETAMINOPHEN 650 MG: 325 TABLET, FILM COATED ORAL at 21:03

## 2023-03-23 RX ADMIN — PANTOPRAZOLE SODIUM 40 MG: 40 TABLET, DELAYED RELEASE ORAL at 10:28

## 2023-03-23 RX ADMIN — MIDODRINE HYDROCHLORIDE 10 MG: 5 TABLET ORAL at 17:47

## 2023-03-23 RX ADMIN — APIXABAN 5 MG: 5 TABLET, FILM COATED ORAL at 10:27

## 2023-03-23 RX ADMIN — LEVETIRACETAM 1000 MG: 500 TABLET, FILM COATED ORAL at 21:01

## 2023-03-23 RX ADMIN — LACOSAMIDE 100 MG: 100 TABLET, FILM COATED ORAL at 12:59

## 2023-03-23 RX ADMIN — RIFAXIMIN 550 MG: 550 TABLET ORAL at 20:54

## 2023-03-23 RX ADMIN — TACROLIMUS 1 MG: 1 CAPSULE ORAL at 20:54

## 2023-03-23 RX ADMIN — OLANZAPINE 5 MG: 5 TABLET, ORALLY DISINTEGRATING ORAL at 20:54

## 2023-03-23 RX ADMIN — TACROLIMUS 1 MG: 1 CAPSULE ORAL at 10:27

## 2023-03-23 RX ADMIN — APIXABAN 5 MG: 5 TABLET, FILM COATED ORAL at 20:54

## 2023-03-23 RX ADMIN — RAMELTEON 8 MG: 8 TABLET, FILM COATED ORAL at 20:54

## 2023-03-23 RX ADMIN — Medication 1 CAPSULE: at 10:27

## 2023-03-23 RX ADMIN — RIFAXIMIN 550 MG: 550 TABLET ORAL at 10:26

## 2023-03-23 RX ADMIN — OLANZAPINE 5 MG: 5 TABLET, ORALLY DISINTEGRATING ORAL at 10:26

## 2023-03-23 RX ADMIN — FOLIC ACID 1 MG: 1 TABLET ORAL at 10:27

## 2023-03-23 RX ADMIN — GUAIFENESIN 600 MG: 600 TABLET, EXTENDED RELEASE ORAL at 10:26

## 2023-03-23 RX ADMIN — MIDODRINE HYDROCHLORIDE 10 MG: 5 TABLET ORAL at 12:59

## 2023-03-23 RX ADMIN — MIDODRINE HYDROCHLORIDE 10 MG: 5 TABLET ORAL at 10:26

## 2023-03-23 ASSESSMENT — ACTIVITIES OF DAILY LIVING (ADL)
ADLS_ACUITY_SCORE: 58
ADLS_ACUITY_SCORE: 56
ADLS_ACUITY_SCORE: 58
ADLS_ACUITY_SCORE: 56
ADLS_ACUITY_SCORE: 59
ADLS_ACUITY_SCORE: 58
ADLS_ACUITY_SCORE: 50
ADLS_ACUITY_SCORE: 54

## 2023-03-23 NOTE — PROGRESS NOTES
Renal Medicine Progress Note            Assessment/Plan:     Assessment:  1) ESRD on MWF chronic dialysis via right tunneled CVC    Blood pressure, hemodynamics/BP's appear good. Tolerated 1.5kg UF last few runs.    Seen by [facility] surgery .  S/p L arm AVF 3/21/23.      2) Hypercalcemia, secondary to immobalization. Noted high bone turnover state, suppressed PTH.  Plan for anti-resorptive (denosumab or zoledronic acid) if gets more severe (>12.0 corrected calcium)     3) Anemia:    Somewhat epo resistant. Getting retacrit 10,000 units most runs . Adequate iron stores with ferritin 252 and sats 26%.        Plan/Recs:  1) HD tomorrow, goal 1.5L UF   2) EPO with HD  3) s/p AVF      Dwayne Laboy MD   [facility] Nephrology   [phone]          Interval History:     - no acute events overnight but remained with sitter   - seen with PT walking in halls, gaining his strength   - visited him after he finished with PT, he was exhausted and not talkative   - denies SOB   - reports pain all over and just being fatigued   - no other complaints            Medications and Allergies:       - MEDICATION INSTRUCTIONS for Dialysis Patients -   Does not apply See Admin Instructions     apixaban ANTICOAGULANT  5 mg Oral BID     folic acid  1 mg Oral or Feeding Tube Daily     guaiFENesin  600 mg Oral BID     lacosamide  100 mg Oral Q12H     lactulose  10 g Oral BID     levETIRAcetam  1,000 mg Oral Q24H     lidocaine  1 patch Transdermal Q24H     lidocaine   Transdermal Q8H BIJU     midodrine  10 mg Oral or Feeding Tube TID w/meals     mineral oil-hydrophilic petrolatum   Topical Daily     multivitamin RENAL  1 capsule Oral Daily     [Held by provider] nystatin  500,000 Units Swish & Spit 4x Daily     OLANZapine zydis  5 mg Oral BID     pantoprazole  40 mg Oral QAM AC     ramelteon  8 mg Oral QPM     rifaximin  550 mg Oral or Feeding Tube BID     sodium chloride (PF)  3 mL Intracatheter Q8H     tacrolimus  1 mg Oral  Q12H      No Known Allergies         Physical Exam:   Vitals were reviewed  /74 (BP Location: Right arm)   Pulse 85   Temp 97.6  F (36.4  C) (Axillary)   Resp 22   Wt 80.3 kg (177 lb 0.5 oz)   SpO2 94%   BMI 24.01 kg/m      Wt Readings from Last 3 Encounters:   03/23/23 80.3 kg (177 lb 0.5 oz)   01/21/23 82.4 kg (181 lb 11.2 oz)   12/28/22 96 kg (211 lb 10.3 oz)       GENERAL APPEARANCE:sleeping but awakens to voice, NAD   RESP: lungs clear ant/lat.  CV: RRR, nl S1/S2, no m/r/g   ABDOMEN: distended but soft and nontender  EXTREMITIES/SKIN: no c/c/rashes/lesions; no edema  LINES: right CVC present, no visible abnormalities.   ACCESS: L AVF, palpable thrill (very soft), audible bruit. L radial pulse intact.               Data:     BMP  Recent Labs   Lab 03/23/23  0607 03/23/23  0526 03/22/23  1321 03/22/23  0642 03/21/23  0825 03/17/23  1308   NA  --   --  138  --   --  135*   POTASSIUM  --   --  4.1  --  4.5 4.2   CHLORIDE  --   --  101  --   --  97*   KELLY  --   --  9.3  --   --  10.3*   CO2  --   --  31*  --   --  28   BUN  --   --  37.3*  --   --  37.6*   CR  --   --  3.03*  --  4.41* 2.85*   GLC 82 86 110* 117* 88 92     CBC  Recent Labs   Lab 03/21/23  0825   HGB 8.4*   *     Lab Results   Component Value Date    AST 20 03/14/2023    ALT 8 (L) 03/14/2023    ALKPHOS 177 (H) 03/14/2023    BILITOTAL 0.4 03/14/2023    CHANDLER 64 (H) 03/12/2023     Lab Results   Component Value Date    INR 1.36 (H) 12/21/2022       Attestation:  I have reviewed today's vital signs, notes, medications, labs and imaging.    Dwayne Laboy MD  [facility] - Nephrology

## 2023-03-23 NOTE — PROGRESS NOTES
CLINICAL NUTRITION SERVICES - REASSESSMENT NOTE    Recommendations Ordered by Registered Dietitian (RD):   - continue supplements as ordered  - continue room service w/ assistance  - recommend back up bolus if intake is <50% of meal   Malnutrition:  (3/16)  % Weight Loss:  > 5% in 1 month (severe malnutrition)   % Intake:  No longer applies   Subcutaneous Fat Loss:  Orbital region moderate depletion, Upper arm region moderate-severe depletion and Thoracic region moderate-severe depletion  Muscle Loss:  Temporal region moderate depletion, Clavicle bone region severe depletion, Acromion bone region severe depletion, Patellar region moderate depletion and Anterior thigh region moderate-severe depletion  Fluid Retention:  Trace     Malnutrition Diagnosis: Severe malnutrition  In Context of:  Acute on Chronic illness or disease     EVALUATION OF PROGRESS TOWARD GOALS   Diet: Regular Diet + Mildly thick liquids  Snacks: Gelatein+ with breakfast and dinner  Magic Cup w/ dinner  Supplements PRN    Nutrition Support: Back up bolus only. Novasource Renal at 80 mL/hr x 3 hours PRN back up bolus for breakfast or lunch if consumes <50%.   Per Bolus Provisions: 240 mL formula = 480 kcal, 22 g protein, 44 g CHO, 0 g fiber and 172 mL free water  Free Water Flush: 100 mL before and after each feeding    Intake/Tolerance:  - Pt was NPO most of 2/21, received back up bolus 1x after procedure.   - appetite for meals fluctuates, at times pt eats very well and other times he requires some encouragement. Ordering more than adequate trays. Consumes % of trays. The past week pt has been eating 100% most often.   - Pt seen in room. Ofe was on speaker phone. Pt continues to eat well, sometimes he gets sick of eating the same thing (this morning wasn't interested in his omelet, but ate the rest of his tray). Still likes the supplements. Last night he ate a Gelatein plus and Magic Cup w/ dinner.     - Labs:   Lab Results   Component  Value Date     03/22/2023    POTASSIUM 4.1 03/22/2023    BUN 37.3 (H) 03/22/2023    CR 3.03 (H) 03/22/2023    MAG 2.7 (H) 03/22/2023    PHOS 3.5 03/20/2023    ALKPHOS 177 (H) 03/14/2023    ALT 8 (L) 03/14/2023    AST 20 03/14/2023    CRP 22.3 (H) 11/22/2022     Lab Results   Component Value Date    GLC 82 03/23/2023    GLC 86 03/23/2023     (H) 03/22/2023     (H) 03/22/2023    GLC 88 03/21/2023       - Weight: 80.3 kg this morning. Down about 20# this admission, 10%.   - Stooling:  3/22 - 500 mL, x3-4  3/21 - x5  3/20 - x2   3/19 - x8     - Meds  Folic Acid 1 mg  Lactulose BID - titrated to 2-3 soft BM daily. Held this morning.   Renal multivitamin daily     ASSESSED NUTRITION NEEDS:  Dosing Weight (2/27) 84.2 kg   Estimated Energy Needs: 6395-4015 kcals (25-30 Kcal/Kg)  Justification: dialysis  Estimated Protein Needs: 100-125 grams protein (1.2-1.5 g pro/Kg)  Justification: dialysis    NEW FINDINGS:   3/21: Creation of left upper arm proximal [id number] arteriovenous fistula. Ligation competing upper arm side branch.    Previous Goals:   Intake of >/=75% estimated needs in meals + supplements.   Evaluation: Met - based on meals ordered and 100% intake most often    Previous Nutrition Diagnosis:   No nutrition diagnosis identified at this time   Evaluation: No change    CURRENT NUTRITION DIAGNOSIS  No nutrition diagnosis identified at this time    INTERVENTIONS  Recommendations / Nutrition Prescription  - continue supplements as ordered  - continue room service w/ assistance  - recommend back up bolus if intake is <50% of meal    Implementation  None new today     Goals  Intake of >/=75% meals + supplements on average.     MONITORING AND EVALUATION:  Progress towards goals will be monitored and evaluated per protocol and Practice Guidelines    Edna Osuna RD, LD  [facility], [facility]  Pager: [phone]  Weekend Pager: [phone]

## 2023-03-23 NOTE — PLAN OF CARE
Goal Outcome Evaluation:    DATE & TIME: 3/22/23 [id number]  Cognitive Concerns/ Orientation : A&O x 1  BEHAVIOR & AGGRESSION TOOL COLOR: Green         ABNL VS/O2: VSS on RA  MOBILITY: Ax2 GB/walker; impulsive, sitter at bedside; walked in room and up in chair this afternoon. Refused to go to chair this shift. 10 lb weight bear limit on L arm  PAIN MANAGMENT: chronic low back pain; declines lidoderm patch; tylenol given with relief  DIET: Regular diet with mildly thickened liquids. Fair appetite but needed encouragement  BOWEL/BLADDER:   Hemodialysis today; on lactulose - BM goal is 2-3 in 24 hr; had 2 small stool this afternoon  ABNL LAB/BG: Mag 2.7  DRAIN/DEVICES: R chest CVC, double lumen for hemodialysis; R PIV saline locked; PEG tube flushed and clamped. L fistula placed 3/21/23 with strong bruit/thrill  SKIN: Scattered bruising; L elbow dried scab;  PROCEDURES:  dialysis  D/C DATE: Pending placement to TCU  OTHER IMPORTANT INFO:

## 2023-03-23 NOTE — PROGRESS NOTES
VASCULAR SURGERY PROGRESS NOTE    Subjective:  Patient resting comfortably at time of examination. No acute events overnight    Objective:  Intake/Output Summary (Last 24 hours) at 3/23/2023 0824  Last data filed at 3/23/2023 0811  Gross per 24 hour   Intake 250 ml   Output 640 ml   Net -390 ml     PHYSICAL EXAM:  /74 (BP Location: Right arm)   Pulse 85   Temp 97.6  F (36.4  C) (Axillary)   Resp 22   Wt 80.3 kg (177 lb 0.5 oz)   SpO2 94%   BMI 24.01 kg/m    Patient has palpable pulse and thrill of the left fistula  Minimal drainage noted   No edema of the left hand, 2+ radial pulse      ASSESSMENT:  Mr. Blanco Osborne is a 57 year old male who underwent a left upper arm proximal radial to cephalic fistula creation on 3/21/2023 for ESRD.    PLAN:  -continue current medications  -no BP or lab draws on the left arm  -continued using tunneled catheter  -will discuss with wife and give tourniquet to use after 1-2 weeks in case patient is not seen once at [facility]  -U/S of the fistula in 6 weeks to assess patency  -Vascular Surgery will sign off     Discussed pt history, exam, assessment and plan with Dr. Mcneil of the vascular surgery service, who is in agreement with the above.    Saima Coker NP  VASCULAR SURGERY     STAFF: Patient examined this morning with CODY Coker.  Very somnolent.  Good pulse within fistula.  Nylon sutures and skin are intact and will need to be removed in approximately 2 weeks.  We will arrange tourniquet fistula exercises with patient's wife due to patient's confusion that can be performed at [facility] to mature the fistula.  He is back on his chronic Eliquis anticoagulation.       Deejay Mcneil MD

## 2023-03-23 NOTE — PLAN OF CARE
DATE & TIME: 3/23/23 7-3 pm   Cognitive Concerns/ Orientation: A & O x 2-3. Irritable, but cooperative this shift. Restless up and down from bed to recliner to commode frequently.   BEHAVIOR & AGGRESSION TOOL COLOR: Green         ABNL VS/O2: VSS on RA  MOBILITY: A1 GB/walker; impulsive, sitter at bedside; 10 lb weight bear limit on L arm  PAIN MANAGMENT: Chronic low back pain; refused lidoderm patch; sleeping between cares this shift.   DIET: Regular diet with mildly thickened liquids.   BOWEL/BLADDER: On Hemodialysis, continent of BM- had 8 small BMs this shift. Holding Lactulose.   ABNL LAB/BG: Urea Nitrogen 62.7 and Cr 4.17.   DRAIN/DEVICES: R chest CVC, double lumen for hemodialysis; R PIV saline locked; PEG tube clamped. L fistula placed 3/21/23 with bruit/thrill present.   SKIN: Dusky/jaundice in color. Scattered bruising; L forearm skin tear covered.   PROCEDURES: Dialysis MWF   D/C DATE: Pending placement to TCU, needs to not have sitter.   OTHER IMPORTANT INFO: Stable. Strength improving but gets fatigued. Sitter at bedside. Slept between cares.

## 2023-03-23 NOTE — PLAN OF CARE
Goal Outcome Evaluation:         DATE & TIME: 3/22/23-3/23/23 [id number]  Cognitive Concerns/ Orientation: A&O x 1-self only   BEHAVIOR & AGGRESSION TOOL COLOR: Green         ABNL VS/O2: VSS on RA  MOBILITY: Ax2 GB/walker; impulsive, sitter at bedside; 10 lb weight bear limit on L arm  PAIN MANAGMENT: chronic low back pain; declines lidoderm patch; tylenol available  DIET: Regular diet with mildly thickened liquids.   BOWEL/BLADDER: On Hemodialysis, continent of BM- 2 Bms this shift  ABNL LAB/BG: Mag 2.7, Daily B (gave apple juice and apple sauce) recheck 82  DRAIN/DEVICES: R chest CVC, double lumen for hemodialysis; R PIV saline locked; PEG tube flushed and clamped. L fistula placed 3/21/23 with strong bruit/thrill  SKIN: Scattered bruising; L elbow dried scab;  PROCEDURES: None this shift   D/C DATE: Pending placement to TCU  OTHER IMPORTANT INFO: Zyprexa x1 for agitation and restless

## 2023-03-23 NOTE — PLAN OF CARE
Goal Outcome Evaluation:      Discussed with: patient and Ofe (present over speaker phone)    Overall Patient Progress: no changeOverall Patient Progress: no change    Outcome Evaluation: Eating %, more often 100% of adequate meals. Continue current interventions, including back-up bolus as needed if pt consumes <50% of a meal.

## 2023-03-23 NOTE — PROGRESS NOTES
[facility]    Medicine Progress Note - Hospitalist Service    Date of Admission:  1/21/2023    Assessment & Plan   Blacno Osborne is a 57 year-old male admitted on 1/21/2023 as a transfer from [facility] for evaluation of loculated pleural effusion. He has extensive PMH including h/o liver transplant 2016 due to alcohol abuse, pAF on chronic anticoagulation, history of diabetes mellitus type 2, CVA, hypertension, CHRISTIAN on BiPAP.  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 He was recovering in [facility] and was transferred to [facility] for consideration of chest tube placement and possible intrapleural lysis for worsening effusion.     Acute metabolic encephalopathy with delirium, multifactorial Improving  Hepatic encephalopathy  Chronic liver disease, history of liver transplant 2016  End-stage renal disease (ESRD), on hemodialysis (HD)  History of seizure, on Keppra and Vimpat  Waxing and waning mentation, coinciding with lactulose being held at [facility]  Earlier in hospital stay, remained confused and had pulled out tracheostomy tube, PICC line and pulled his dialysis catheter.  Lines replaced.  Psychiatry consulted, recent follow-up 2/21, 2/28, see notes.    Mental status continues to fluctuate with periods of alertness and increased sleepiness, overall improving    Complicated, prolonged hospital course with mental status concerns multifactorial related to hepatic encephalopathy, end-stage renal disease on dialysis, past PEA arrest, seizure disorder, medication, and chronic liver disease with prior liver transplant.  As mental status fluctuates will continue to require one-to-one sitter as needed for added safety and supervision.  Reassess daily.    Continue to reorient and redirect  as able.  Avoid restraints if possible with goals of safety and close supervision (given multiple lines and tubes - dialysis catheter, PEG tube, IV, etc).    Maintain normal day/night cycles and sleep-wake cycles.  Minimize sedating medications as able.  Remains weak and deconditioned with complex, prolonged hospital course and limited mobility.  Encouragement and support offered daily to patient.    Continue Lactulose to 10 mg BID, monitor for symptoms; goal to have 2-3 bowel movements per day    Ongoing hemodialysis, as per nephrology, 3X/week dialysis schedule    Nephrology consult follow-up appreciated, dialysis patient    Continue ramelteon 8 mg at bedtime; monitor for side effects including AM sedation, reassess daily    Continue olanzapine (Zyprexa) 5 mg bid and as needed; monitor for side effects including sedation    Seizure disorder stable, continue Keppra and Vimpat; monitor, no recurrent seizures reported of recent    Reconsult psychiatry as needed              Bilateral pleural effusions   Acute respiratory failure requiring tracheostomy  - resolved    Pneumonia  - resolved   ARDS  - resolved    Right pneumothorax - resolved   *Initial event was parainfluenza and strep pneumo pneumonia back in November 2022. Treated for ARDS. Had failed extubation x2 and tracheostomy performed on previous hospitalization. *Had additional complications of bilateral pneumothoraces as well as hydropneumothorax- pleural fluid grew candida parapsilosis  *Completed 4-week antifungal treatment. While in [facility] he was successfully weaned from the ventilator.  *Recently there were concern for worsening effusion while at [facility] and had thoracentesis 01/13 which returned exudative without growth on cultures. CT chest/abdomen/pelvis on 01/18 demonstrated worsening effusions and concerns for infected parapneumonic effusions with possible SBP.  Multiple pulmonologist at [facility] reviewed CT scan recommended transfer to [facility] for  consideration of chest tube placement and possible intrapleural lysis  *Thoracic surgery (Dr. Jackson) consulted during admission   *CT chest 1/22, small effusions on imaging and so chest tube was not inserted.   ID consulted, see note 2/10.  *Patient pulled out his tracheostomy tube on the night 2/1-2/2 and is tolerating well without increased shortness of breath persistent cough or worsening hypoxia  * Chest x-ray 2/3 with cardiomegaly, chronic small bilateral pleural effusions, no pulmonary edema; right internal jugular dialysis catheter in place    3/12 Pulmonary consult, see note, concerns of hypercapnia, atelectasis, with consideration of BiPAP trial; no recommendations for antibiotics with infiltrates felt to be atelectasis    Stable, continue to monitor respiratory status, recently stable on room air; occasional cough reported    Encourage incentive spirometry as able, up to chair, deep breathing    Wound care previously consulted for tracheostomy site care, monitor for signs and symptoms of infection, healing well - resolved.    Encourage ambulation      # Splenic infract ?  Wedge shaped area on CT scan chest and CT abdomen  Hematology consulted  Discussed plan with Dr Smith on 3/9 from Hematology/oncology consult appreciated recommended supportive managment  TTE no thrombus or vegetations   No abdominal pain CTM       S/p liver transplant 2016   Chronic immunosuppression, on tacrolimus  Cirrhosis with ascites  Subacute bacterial peritonitis (SBP), resolved  *Main manifestations of this are hepatic encephalopathy and ascites.  Hepatologist at [facility] felt that most likely reason for the cirrhosis was metabolic syndrome or alcohol intake.  There was no evidence of rejection on biopsy and there was some evidence of regeneration. Paracentesis done on 12/22/2022 showed lactobacillus indicating SBP, treated with Unasyn and Augmentin, finished 2-week course 1/12/2023.  Requires large-volume paracentesis  "periodically for recurring ascites.    *Paracentesis 1/13/2023 yielded about 7500 cc. Cultures NGTD. paracentesis on 1/24 with 4.8 L fluid removal  Paracentesis on 3/6/23 No SBP    Continue intermittent paracentesis as needed if develops symptomatic ascites    Monitor for signs and symptoms of recurrent spontaneous bacterial peritonitis     Further treatment for recurrent ascites with TIPS deferred to his Liver Transplant team and/or Hepatology, he has an appointment on 4/21/2023 with Hepatology, Dr. Simms    Continue Tacrolimus 1 mg BID, rifaximin 550 mg BID    Continue lactulose as ordered, titrate as needed to have 2-3 bms    GI consult as needed in hospital for new acute issues, otherwise as outpatient    Encourgae high protein diet      Having bowel movements.  Continue lactulose to have 2-3 bowel movements.  CT abdomen on March 9, 2023 showed small to moderate ascites      Goals of care   As per prior rounding provider, \"on 1/25 nursing had mentioned that patient had refused to undergo dialysis and want to stop care, palliative care was consulted.  Patient and his spouse denied wanting to stop care and wanted ongoing restorative care including full code\"    Monitor, Full Code status    -Needs placement to TCU     Diarrhea, improved, on lactulose for chronic liver disease  - C difficile negative on 1/31. Secondary to lactulose.  - discontinued rectal tube (2/12) as stools more formed    Continue lactulose with goal of 2 to 3 soft bowel movement in 24 hours     Paroxysmal atrial fibrillation  Chronic anticoagulation, apixaban  Remains in sinus rhythm, on anticoagulation with apixaban indefinitely for stroke prophylaxis.      Monitor rhythm    Continue apixaban anticoagulation    Monitor hemoglobin, see below    Apixaban held for fistula placmenet      Acute anemia  Pt has required intermittent transfusions for on-going anemia.  Anemia most likely secondary to critical illness/chronic disease, chronic renal " disease.  Baseline hemoglobin around 7-8  Likely Epo resistance  Hb 8.4 on 3/21    # Hypercalcemia  Josue hypercalcemia of Immobility  Discussed with nephrology on 3/8/23  Hold VIT D  -Continue to hold Phos lo  Repeat Phosphorus levels in 3-4 days   Encourage ambulation   nephrology considering antiresorptive therapy if hypercalcemia becomes more severe  -Calcium lvel 9.3 on 3/22      History PEA arrest   PEA arrest prior to his previous admission and 1 episode of PEA associated with desaturation on 12/20.  No structural cardiac disease.     Stable, continue cardiac Monitoring     Chronic Hypotension  In the past has developed baseline hypotension with cirrhosis and prolonged critical illness.  On hemodialysis.  Receiving midodrine and albumin during dailysis    Continue midodrine, as per nephrology      History of seizure   After hypoxic event, in the context of baseline multifactorial encephalopathy secondary to his ongoing critical illness and prior cardiac arrests and prior strokes and cirrhosis with hepatic encephalopathy. MRI of head of 12/20/22 reveals no PRES or leptomeningeal enhancement but scattered.  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   No recent seizure activity.    Continue levetiracetam, lacosamide     Seizure precautions    Outpatient follow-up with neurology, consult inpatient if needed     # ESRD  # Anemia due to renal disease  # Hypotension during dialysis    -As per nephrology    Nephrology reviewing vein mapping to assess options for internal access placement for dialysis, see note 3/16  Underwent Fistula placement on  3/21/22   -- Vascular surgery following-continue to use for tunnel catheter.  Outpatient follow-up with vascular surgery and for will need a follow-up ultrasound of the fistula in 6 weeks to assess patency.  -Continue Eliquis       Diabetes mellitus type 2  *Hemoglobin A1c on November 2022 was 4.7  *By report, on Lantus  insulin in the past.  Blood sugar checks and sliding scale insulin previously discontinued as has been stable without insulin needs    Monitor with periodic blood sugar checks as needed      # Hypomagnesemia Resolved  Mag replacement        Severe malnutrition, protein and calorie type  Moderate dysphagia  G-tube feedings    Continue with tube feeds via PEG tube    IR replaced the PEG tube on 2/8/2023, monitor    Nutritionist consulted and following for tube feeds    SLP following as well. Oral diet as per speech and language therapy (SLP)     2/20 Nutrition following for tube feeds     Underwent video swallow on 2/23/22     -Undergoing Prn tube feed  Encourage high-protein diet  - Nutrition notes reviewed patient eating 50 to 100% and sometimes more often 100% of adequate meals.  Continue current interventions.  As needed boluses of patient consumes less than 50% of the meal  - Family encouraging patient to eat high protein diet including protein bars     Anxiety  Fluoxetine was discontinued as per psychiatry recommendation on 2/2 (see consult note for details)     Stable, monitor; comfort and reassurance offered during visit    Psychiatry follow-up         Diet: Room Service  Snacks/Supplements Adult: Other; Gelatein+ with brkfst and lunch, and magic cup with dinner (RD); With Meals  Adult Formula Bolus Feeding: Novasource Renal; Route: Gastrostomy; 3 times daily; Volume per Bolus: 240; mL(s); Continue to encourage after meal bolus at 80 mL/hr x 3 hrs, If patient consumes <50% of that meal  Combination Diet Regular Diet; Mildly Thick (level 2) (OK for fresh fruit (e.g., pineapple) per SLP)    DVT Prophylaxis: DOAC  Nixon Catheter: Not present  Lines: PRESENT      CVC Double Lumen Right External jugular Tunneled-Site Assessment: WDL      Cardiac Monitoring: None  Code Status: Full Code      Clinically Significant Risk Factors            # Hypomagnesemia: Lowest Mg = 1.5 mg/dL in last 2 days, will replace as needed    # Hypoalbuminemia: Lowest albumin = 1.9 g/dL at 1/23/2023  6:38 AM, will monitor as appropriate            # Severe Malnutrition: based on nutrition assessment        # Family -       Disposition Plan     Expected Discharge Date: 03/23/2023, 12:00 PM  Discharge Delays: Placement - LTC  Destination: inpatient rehabilitation facility  Discharge Comments: Dialysis pt MWF. Will need placement TCU/LTC. [facility] willing to accept once no sitter, SW to follow up when sitter free          Ashkan Hernandez MD  Hospitalist Service  [facility]  Securely message with Instant Information (more info)  Text page via [employer] Paging/Directory   ______________________________________________________________________    Interval History   Patient seen and examined at bedside  Patient feels well  No complaints    Physical Exam   Vital Signs: Temp: 97.6  F (36.4  C) Temp src: Axillary BP: 110/74 Pulse: 85   Resp: 22 SpO2: 94 % O2 Device: None (Room air)    Weight: 177 lbs .47 oz    Physical Exam  Cardiovascular:      Rate and Rhythm: Normal rate and regular rhythm.   Pulmonary:      Effort: Pulmonary effort is normal. No respiratory distress.   Abdominal:      Palpations: Abdomen is soft.      Tenderness: There is no abdominal tenderness.      Comments: Mild distension   Musculoskeletal:      Right lower leg: No edema.      Left lower leg: No edema.   Neurological:      Mental Status: He is alert.       Medical Decision Making     Data     I have personally reviewed the following data over the past 24 hrs:    N/A  \   N/A   / N/A     138 101 37.3 (H) /  82   4.1 31 (H) 3.03 (H) \

## 2023-03-24 LAB
ANION GAP SERPL CALCULATED.3IONS-SCNC: 11 MMOL/L (ref 7–15)
BUN SERPL-MCNC: 77.6 MG/DL (ref 6–20)
CALCIUM SERPL-MCNC: 11.1 MG/DL (ref 8.6–10)
CHLORIDE SERPL-SCNC: 96 MMOL/L (ref 98–107)
CREAT SERPL-MCNC: 4.79 MG/DL (ref 0.67–1.17)
DEPRECATED HCO3 PLAS-SCNC: 28 MMOL/L (ref 22–29)
GFR SERPL CREATININE-BSD FRML MDRD: 13 ML/MIN/1.73M2
GLUCOSE SERPL-MCNC: 113 MG/DL (ref 70–99)
MAGNESIUM SERPL-MCNC: 2.6 MG/DL (ref 1.7–2.3)
POTASSIUM SERPL-SCNC: 5.2 MMOL/L (ref 3.4–5.3)
SODIUM SERPL-SCNC: 135 MMOL/L (ref 136–145)
TACROLIMUS BLD-MCNC: 4.2 UG/L (ref 5–15)
TME LAST DOSE: ABNORMAL H
TME LAST DOSE: ABNORMAL H

## 2023-03-24 PROCEDURE — 80048 BASIC METABOLIC PNL TOTAL CA: CPT | Performed by: STUDENT IN AN ORGANIZED HEALTH CARE EDUCATION/TRAINING PROGRAM

## 2023-03-24 PROCEDURE — 250N000011 HC RX IP 250 OP 636: Performed by: STUDENT IN AN ORGANIZED HEALTH CARE EDUCATION/TRAINING PROGRAM

## 2023-03-24 PROCEDURE — 250N000012 HC RX MED GY IP 250 OP 636 PS 637: Performed by: STUDENT IN AN ORGANIZED HEALTH CARE EDUCATION/TRAINING PROGRAM

## 2023-03-24 PROCEDURE — 83735 ASSAY OF MAGNESIUM: CPT | Performed by: STUDENT IN AN ORGANIZED HEALTH CARE EDUCATION/TRAINING PROGRAM

## 2023-03-24 PROCEDURE — 120N000001 HC R&B MED SURG/OB

## 2023-03-24 PROCEDURE — 634N000001 HC RX 634: Performed by: STUDENT IN AN ORGANIZED HEALTH CARE EDUCATION/TRAINING PROGRAM

## 2023-03-24 PROCEDURE — 250N000013 HC RX MED GY IP 250 OP 250 PS 637: Performed by: STUDENT IN AN ORGANIZED HEALTH CARE EDUCATION/TRAINING PROGRAM

## 2023-03-24 PROCEDURE — 80197 ASSAY OF TACROLIMUS: CPT | Performed by: STUDENT IN AN ORGANIZED HEALTH CARE EDUCATION/TRAINING PROGRAM

## 2023-03-24 PROCEDURE — 36415 COLL VENOUS BLD VENIPUNCTURE: CPT | Performed by: STUDENT IN AN ORGANIZED HEALTH CARE EDUCATION/TRAINING PROGRAM

## 2023-03-24 PROCEDURE — 90937 HEMODIALYSIS REPEATED EVAL: CPT

## 2023-03-24 PROCEDURE — 99232 SBSQ HOSP IP/OBS MODERATE 35: CPT | Performed by: STUDENT IN AN ORGANIZED HEALTH CARE EDUCATION/TRAINING PROGRAM

## 2023-03-24 PROCEDURE — 258N000003 HC RX IP 258 OP 636: Performed by: STUDENT IN AN ORGANIZED HEALTH CARE EDUCATION/TRAINING PROGRAM

## 2023-03-24 PROCEDURE — 90935 HEMODIALYSIS ONE EVALUATION: CPT | Performed by: STUDENT IN AN ORGANIZED HEALTH CARE EDUCATION/TRAINING PROGRAM

## 2023-03-24 RX ORDER — HEPARIN SODIUM 1000 [USP'U]/ML
500 INJECTION, SOLUTION INTRAVENOUS; SUBCUTANEOUS CONTINUOUS
Status: DISCONTINUED | OUTPATIENT
Start: 2023-03-24 | End: 2023-03-24

## 2023-03-24 RX ADMIN — LACOSAMIDE 100 MG: 100 TABLET, FILM COATED ORAL at 00:32

## 2023-03-24 RX ADMIN — SODIUM CHLORIDE 250 ML: 9 INJECTION, SOLUTION INTRAVENOUS at 10:47

## 2023-03-24 RX ADMIN — SODIUM CHLORIDE 300 ML: 9 INJECTION, SOLUTION INTRAVENOUS at 10:46

## 2023-03-24 RX ADMIN — HEPARIN SODIUM 1900 UNITS: 1000 INJECTION INTRAVENOUS; SUBCUTANEOUS at 10:46

## 2023-03-24 RX ADMIN — LEVETIRACETAM 1000 MG: 500 TABLET, FILM COATED ORAL at 20:57

## 2023-03-24 RX ADMIN — HEPARIN SODIUM 500 UNITS/HR: 1000 INJECTION INTRAVENOUS; SUBCUTANEOUS at 10:45

## 2023-03-24 RX ADMIN — LACOSAMIDE 100 MG: 100 TABLET, FILM COATED ORAL at 12:02

## 2023-03-24 RX ADMIN — TACROLIMUS 1 MG: 1 CAPSULE ORAL at 07:42

## 2023-03-24 RX ADMIN — EPOETIN ALFA-EPBX 20000 UNITS: 10000 INJECTION, SOLUTION INTRAVENOUS; SUBCUTANEOUS at 10:46

## 2023-03-24 RX ADMIN — LACTULOSE 10 G: 10 SOLUTION ORAL at 20:12

## 2023-03-24 RX ADMIN — RIFAXIMIN 550 MG: 550 TABLET ORAL at 20:10

## 2023-03-24 RX ADMIN — APIXABAN 5 MG: 5 TABLET, FILM COATED ORAL at 07:41

## 2023-03-24 RX ADMIN — OLANZAPINE 5 MG: 5 TABLET, ORALLY DISINTEGRATING ORAL at 07:42

## 2023-03-24 RX ADMIN — ACETAMINOPHEN 650 MG: 325 TABLET, FILM COATED ORAL at 20:11

## 2023-03-24 RX ADMIN — OLANZAPINE 5 MG: 5 TABLET, ORALLY DISINTEGRATING ORAL at 00:32

## 2023-03-24 RX ADMIN — OLANZAPINE 5 MG: 5 TABLET, ORALLY DISINTEGRATING ORAL at 20:11

## 2023-03-24 RX ADMIN — RIFAXIMIN 550 MG: 550 TABLET ORAL at 07:41

## 2023-03-24 RX ADMIN — Medication 1 CAPSULE: at 07:42

## 2023-03-24 RX ADMIN — GUAIFENESIN 600 MG: 600 TABLET, EXTENDED RELEASE ORAL at 07:42

## 2023-03-24 RX ADMIN — ACETAMINOPHEN 650 MG: 325 TABLET, FILM COATED ORAL at 00:42

## 2023-03-24 RX ADMIN — MELATONIN TAB 3 MG 6 MG: 3 TAB at 00:32

## 2023-03-24 RX ADMIN — ACETAMINOPHEN 650 MG: 325 TABLET, FILM COATED ORAL at 04:44

## 2023-03-24 RX ADMIN — HEPARIN SODIUM 1900 UNITS: 1000 INJECTION INTRAVENOUS; SUBCUTANEOUS at 10:45

## 2023-03-24 RX ADMIN — FOLIC ACID 1 MG: 1 TABLET ORAL at 07:42

## 2023-03-24 RX ADMIN — ACETAMINOPHEN 650 MG: 325 TABLET, FILM COATED ORAL at 09:31

## 2023-03-24 RX ADMIN — HEPARIN SODIUM 500 UNITS: 1000 INJECTION INTRAVENOUS; SUBCUTANEOUS at 08:00

## 2023-03-24 RX ADMIN — WHITE PETROLATUM: 1.75 OINTMENT TOPICAL at 07:59

## 2023-03-24 RX ADMIN — RAMELTEON 8 MG: 8 TABLET, FILM COATED ORAL at 20:12

## 2023-03-24 RX ADMIN — GUAIFENESIN 600 MG: 600 TABLET, EXTENDED RELEASE ORAL at 20:11

## 2023-03-24 RX ADMIN — PANTOPRAZOLE SODIUM 40 MG: 40 TABLET, DELAYED RELEASE ORAL at 07:41

## 2023-03-24 RX ADMIN — MIDODRINE HYDROCHLORIDE 10 MG: 5 TABLET ORAL at 18:35

## 2023-03-24 RX ADMIN — APIXABAN 5 MG: 5 TABLET, FILM COATED ORAL at 20:10

## 2023-03-24 RX ADMIN — TACROLIMUS 1 MG: 1 CAPSULE ORAL at 20:11

## 2023-03-24 RX ADMIN — MIDODRINE HYDROCHLORIDE 10 MG: 5 TABLET ORAL at 07:41

## 2023-03-24 ASSESSMENT — ACTIVITIES OF DAILY LIVING (ADL)
ADLS_ACUITY_SCORE: 61
ADLS_ACUITY_SCORE: 58
ADLS_ACUITY_SCORE: 60
ADLS_ACUITY_SCORE: 58
ADLS_ACUITY_SCORE: 60
ADLS_ACUITY_SCORE: 58
ADLS_ACUITY_SCORE: 60
ADLS_ACUITY_SCORE: 61
ADLS_ACUITY_SCORE: 61
ADLS_ACUITY_SCORE: 58
ADLS_ACUITY_SCORE: 58
ADLS_ACUITY_SCORE: 60

## 2023-03-24 NOTE — PROGRESS NOTES
Potassium   Date Value Ref Range Status   03/24/2023 5.2 3.4 - 5.3 mmol/L Final   12/29/2022 3.4 3.4 - 5.3 mmol/L Final   04/20/2020 3.9 3.4 - 5.3 mmol/L Final     Potassium POCT   Date Value Ref Range Status   01/19/2023 3.6 3.5 - 5.0 mmol/L Final     Hemoglobin   Date Value Ref Range Status   03/21/2023 8.4 (L) 13.3 - 17.7 g/dL Final   04/20/2020 14.3 13.3 - 17.7 g/dL Final     Creatinine   Date Value Ref Range Status   03/24/2023 4.79 (H) 0.67 - 1.17 mg/dL Final   04/20/2020 1.06 0.66 - 1.25 mg/dL Final     Urea Nitrogen   Date Value Ref Range Status   03/24/2023 77.6 (H) 6.0 - 20.0 mg/dL Final   12/29/2022 46 (H) 7 - 30 mg/dL Final   04/20/2020 23 7 - 30 mg/dL Final     Sodium   Date Value Ref Range Status   03/24/2023 135 (L) 136 - 145 mmol/L Final   04/20/2020 139 133 - 144 mmol/L Final     INR   Date Value Ref Range Status   12/21/2022 1.36 (H) 0.85 - 1.15 Final   10/07/2019 1.20 (H) 0.86 - 1.14 Final       DIALYSIS PROCEDURE NOTE  Hepatitis status of previous patient on machine log was checked and verified ok to use with this patients hepatitis status.  Patient dialyzed for 3hrs. on a K2 bath with a net fluid removal of 1.5L.  A BFR of 350ml/min was obtained via a Right tunnelled internal jugular CVC. The treatment plan was discussed with Dr. Laboy during the treatment.    Total heparin received during the treatment: 1500units.      Line flushed, clamped and capped with heparin 1:1000 1.9mL (1900units) per lumen     Meds given: 20,000units epoetin and midodrine given pre treatment on home unit.   Complications: None       Person educated: patient. Knowledge base minimal. Barriers to learning: confusion. Educated on procedure & access care via verbal mode. Patient verbalized understanding needs reinforcement. Pt prefers verbal education style.         ICEBOAT? Timeout performed pre-treatment  I: Patient was identified using 2 identifiers  C:  Consent Signed Yes  E: Equipment preventative maintenance  is current and dialysis delivery system OK to use  B: Hepatitis B Surface Antigen: Negative; Draw Date: 03/9/23      Hepatitis B Surface Antibody:Susceptible; Draw Date: 03/9/23  O: Dialysis orders present and complete prior to treatment  A: Vascular access verified and assessed prior to treatment  T: Treatment was performed at a clinically appropriate time  ?: Patient was allowed to ask questions and address concerns prior to treatment  See Adult Hemodialysis flowsheet in [facility] for further details and post assessment.  Machine water alarm in place and functioning. Transducer pods intact and checked every 15min.   Pt returned via bed transport.  Chlorine/Chloramine water system checked every 4 hours.  Outpatient Dialysis TBD on MWF  Patient repositioned every 2 hours during the treatment.  Post treatment report given to MIKEL Hess RN regarding 1.5L of fluid removed, last BP of 136/91, and patient pain rating of 6-8/10 to lower back, pt agreeable to trying lidacaine patch on return to unit.

## 2023-03-24 NOTE — PROGRESS NOTES
Major Shift Events:  No reportable events throughout shift. Remains encephalopathic/delerious.  Plan: HD run    For vital signs and complete assessments, please see documentation flowsheets.

## 2023-03-24 NOTE — PROGRESS NOTES
Renal Medicine Progress Note            Assessment/Plan:     Assessment:  1) ESRD on MWF chronic dialysis via right tunneled CVC    Blood pressure, hemodynamics/BP's appear good. Tolerated 1.5kg UF last few runs.    Seen by [facility] surgery .  S/p L arm AVF 3/21/23. RIJ catheter, MWF schedule, 3h, heparin with run.      2) Hypercalcemia, secondary to immobalization. Noted high bone turnover state, suppressed PTH.  Plan for anti-resorptive (denosumab or zoledronic acid) if gets more severe (>12.0 corrected calcium)     3) Anemia:    Somewhat epo resistant. Getting retacrit 10,000 units most runs, now escalated to 20K . Adequate iron stores with ferritin 252 and sats 26%.        Plan/Recs:  1) HD today 1.5L UF. Next HD Monday, nephrology will not see the patient this weekend unless acute needs arise. Please page if concerns.   2) EPO 20K with HD  3) s/p AVF      Dwayne Laboy MD   [facility] - Nephrology   [phone]          Interval History:       - no acute events overnight   - seen during dialysis  - asks how his kidneys are doing. Discussed how he's not making urine and is dialysis dependent currently ad likely for the forseeable future. He states he hates dialysis but understands he won't survive without it so is willing to tolerate it  - denies sOB   - denies other concerns            Medications and Allergies:       - MEDICATION INSTRUCTIONS for Dialysis Patients -   Does not apply See Admin Instructions     apixaban ANTICOAGULANT  5 mg Oral BID     folic acid  1 mg Oral or Feeding Tube Daily     guaiFENesin  600 mg Oral BID     lacosamide  100 mg Oral Q12H     lactulose  10 g Oral BID     levETIRAcetam  1,000 mg Oral Q24H     lidocaine  1 patch Transdermal Q24H     lidocaine   Transdermal Q8H BIJU     midodrine  10 mg Oral or Feeding Tube TID w/meals     mineral oil-hydrophilic petrolatum   Topical Daily     multivitamin RENAL  1 capsule Oral Daily     [Held by provider] nystatin  500,000 Units  Swish & Spit 4x Daily     OLANZapine zydis  5 mg Oral BID     pantoprazole  40 mg Oral QAM AC     ramelteon  8 mg Oral QPM     rifaximin  550 mg Oral or Feeding Tube BID     sodium chloride (PF)  3 mL Intracatheter Q8H     tacrolimus  1 mg Oral Q12H      No Known Allergies         Physical Exam:   Vitals were reviewed  BP (!) 136/91 (BP Location: Right arm, Cuff Size: Adult Regular)   Pulse 77   Temp 98.4  F (36.9  C) (Oral)   Resp 22   Wt 80.3 kg (177 lb 0.5 oz)   SpO2 98%   BMI 24.01 kg/m      Wt Readings from Last 3 Encounters:   03/23/23 80.3 kg (177 lb 0.5 oz)   01/21/23 82.4 kg (181 lb 11.2 oz)   12/28/22 96 kg (211 lb 10.3 oz)       GENERAL APPEARANCE:sleeping but awakens to voice, NAD   RESP: lungs clear ant/lat.  CV: RRR, nl S1/S2, no m/r/g   ABDOMEN: distended but soft and nontender  EXTREMITIES/SKIN: no c/c/rashes/lesions; no edema  LINES: right CVC present, no visible abnormalities.   ACCESS: L AVF, palpable thrill (very soft), audible bruit. L radial pulse intact.               Data:     BMP  Recent Labs   Lab 03/24/23  0815 03/23/23  1435 03/23/23  0607 03/23/23  0526 03/22/23  1321 03/22/23  0642 03/21/23  0825 03/17/23  1308   * 136  --   --  138  --   --  135*   POTASSIUM 5.2 4.7  --   --  4.1  --  4.5 4.2   CHLORIDE 96* 99  --   --  101  --   --  97*   KELLY 11.1* 10.6*  --   --  9.3  --   --  10.3*   CO2 28 27  --   --  31*  --   --  28   BUN 77.6* 62.7*  --   --  37.3*  --   --  37.6*   CR 4.79* 4.17*  --   --  3.03*  --  4.41* 2.85*   * 125* 82 86 110*   < > 88 92    < > = values in this interval not displayed.     CBC  Recent Labs   Lab 03/21/23  0825   HGB 8.4*   *     Lab Results   Component Value Date    AST 20 03/14/2023    ALT 8 (L) 03/14/2023    ALKPHOS 177 (H) 03/14/2023    BILITOTAL 0.4 03/14/2023    CHANDLER 64 (H) 03/12/2023     Lab Results   Component Value Date    INR 1.36 (H) 12/21/2022       Attestation:  I have reviewed today's vital signs, notes,  medications, labs and imaging.    Dwayne Laboy MD  [facility] - Nephrology

## 2023-03-24 NOTE — PLAN OF CARE
Summary: [id number] 3/23/23 encephalopathy  Orientation: alert to self and place   Activity Level: Ax1 GB/w  Fall Risk: yes  Behavior & Aggression Tool Color: green  Pain Management: PRN Tylenol given x2  ABNL VS/O2: VSS on RA  ABNL Lab/BG: n/a  Diet: combo diet reg, mild thick level 2 liquids  Bowel/Bladder: continent of BM, on hemodialysis M,W,F   Drains/Devices: R chest CVC, R PIV saline locked, PEG tube clamped  Tests/Procedures for next shift: n/a  Anticipated DC date: placement pending  Other Important Info: sitter in the room

## 2023-03-24 NOTE — PLAN OF CARE
DATE & TIME: 3/24/23 7-3 pm   Cognitive Concerns/ Orientation: A & O x 2-3. Irritable at times, but cooperative this shift. Restless up and down from bed to recliner to commode frequently.   BEHAVIOR & AGGRESSION TOOL COLOR: Green         ABNL VS/O2: VSS on RA  MOBILITY: A1-2 GB/walker; impulsive, sitter at bedside; 10 lb weight bear limit on L arm  PAIN MANAGMENT: Chronic low back pain; refused lidoderm patch; PRN Tylenol x1.   DIET: Regular diet with mildly thickened liquids.   BOWEL/BLADDER: On Hemodialysis, continent of BM-  multiple BMs. Held Lactulose.   ABNL LAB/BG: Mg 2.6, Urea Nitrogen 77.6, Cr 4.79, and Calcium 11.1.   DRAIN/DEVICES: R chest CVC, double lumen for hemodialysis; PEG tube clamped. L fistula placed 3/21/23 with bruit/thrill present.   SKIN: Dusky/jaundice in color. Scattered bruising; L forearm skin tear covered. L fistula site with sutures intact.   PROCEDURES: Dialysis MWF   D/C DATE: Pending placement to TCU, needs to not have sitter.   OTHER IMPORTANT INFO: Stable. Strength improving but gets fatigued. Sitter at bedside.

## 2023-03-24 NOTE — PROGRESS NOTES
[facility]    Medicine Progress Note - Hospitalist Service    Date of Admission:  1/21/2023    Assessment & Plan   Blanco Osborne is a 57 year-old male admitted on 1/21/2023 as a transfer from [facility] for evaluation of loculated pleural effusion. He has extensive PMH including h/o liver transplant 2016 due to alcohol abuse, pAF on chronic anticoagulation, history of diabetes mellitus type 2, CVA, hypertension, CHRISTIAN on BiPAP.  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 He was recovering in [facility] and was transferred to [facility] for consideration of chest tube placement and possible intrapleural lysis for worsening effusion.     Acute metabolic encephalopathy with delirium, multifactorial Improving  Hepatic encephalopathy  Chronic liver disease, history of liver transplant 2016  End-stage renal disease (ESRD), on hemodialysis (HD)  History of seizure, on Keppra and Vimpat  Waxing and waning mentation, coinciding with lactulose being held at [facility]  Earlier in hospital stay, remained confused and had pulled out tracheostomy tube, PICC line and pulled his dialysis catheter.  Lines replaced.  Psychiatry consulted, recent follow-up 2/21, 2/28, see notes.    Mental status continues to fluctuate with periods of alertness and increased sleepiness, overall improving    Complicated, prolonged hospital course with mental status concerns multifactorial related to hepatic encephalopathy, end-stage renal disease on dialysis, past PEA arrest, seizure disorder, medication, and chronic liver disease with prior liver transplant.  As mental status fluctuates will continue to require one-to-one sitter as needed for added safety and supervision.  Reassess daily.    Continue to reorient and redirect  as able.  Avoid restraints if possible with goals of safety and close supervision (given multiple lines and tubes - dialysis catheter, PEG tube, IV, etc).    Maintain normal day/night cycles and sleep-wake cycles.  Minimize sedating medications as able.  Remains weak and deconditioned with complex, prolonged hospital course and limited mobility.  Encouragement and support offered daily to patient.    Continue Lactulose to 10 mg BID, monitor for symptoms; goal to have 2-3 bowel movements per day    Ongoing hemodialysis, as per nephrology, 3X/week dialysis schedule    Nephrology consult follow-up appreciated, dialysis patient    Continue ramelteon 8 mg at bedtime; monitor for side effects including AM sedation, reassess daily    Continue olanzapine (Zyprexa) 5 mg bid and as needed; monitor for side effects including sedation    Seizure disorder stable, continue Keppra and Vimpat; monitor, no recurrent seizures reported of recent    Reconsult psychiatry as needed     -Will consult neurology as mental status has been fluctuating               Bilateral pleural effusions   Acute respiratory failure requiring tracheostomy  - resolved    Pneumonia  - resolved   ARDS  - resolved    Right pneumothorax - resolved   *Initial event was parainfluenza and strep pneumo pneumonia back in November 2022. Treated for ARDS. Had failed extubation x2 and tracheostomy performed on previous hospitalization. *Had additional complications of bilateral pneumothoraces as well as hydropneumothorax- pleural fluid grew candida parapsilosis  *Completed 4-week antifungal treatment. While in LTACH he was successfully weaned from the ventilator.  *Recently there were concern for worsening effusion while at LTACH and had thoracentesis 01/13 which returned exudative without growth on cultures. CT chest/abdomen/pelvis on 01/18 demonstrated worsening effusions and concerns for infected parapneumonic effusions with possible SBP.  Multiple pulmonologist at  LTACH reviewed CT scan recommended transfer to [facility] for consideration of chest tube placement and possible intrapleural lysis  *Thoracic surgery (Dr. Jackson) consulted during admission   *CT chest 1/22, small effusions on imaging and so chest tube was not inserted.   ID consulted, see note 2/10.  *Patient pulled out his tracheostomy tube on the night 2/1-2/2 and is tolerating well without increased shortness of breath persistent cough or worsening hypoxia  * Chest x-ray 2/3 with cardiomegaly, chronic small bilateral pleural effusions, no pulmonary edema; right internal jugular dialysis catheter in place    3/12 Pulmonary consult, see note, concerns of hypercapnia, atelectasis, with consideration of BiPAP trial; no recommendations for antibiotics with infiltrates felt to be atelectasis    Stable, continue to monitor respiratory status, recently stable on room air; occasional cough reported    Encourage incentive spirometry as able, up to chair, deep breathing    Wound care previously consulted for tracheostomy site care, monitor for signs and symptoms of infection, healing well - resolved.    Encourage ambulation      # Splenic infract ?  Wedge shaped area on CT scan chest and CT abdomen  Hematology consulted  Discussed plan with Dr Smith on 3/9 from Hematology/oncology consult appreciated recommended supportive managment  TTE no thrombus or vegetations   No abdominal pain CTM       S/p liver transplant 2016   Chronic immunosuppression, on tacrolimus  Cirrhosis with ascites  Subacute bacterial peritonitis (SBP), resolved  *Main manifestations of this are hepatic encephalopathy and ascites.  Hepatologist at [facility] felt that most likely reason for the cirrhosis was metabolic syndrome or alcohol intake.  There was no evidence of rejection on biopsy and there was some evidence of regeneration. Paracentesis done on 12/22/2022 showed lactobacillus indicating SBP, treated with Unasyn and Augmentin, finished 2-week  "course 1/12/2023.  Requires large-volume paracentesis periodically for recurring ascites.    *Paracentesis 1/13/2023 yielded about 7500 cc. Cultures NGTD. paracentesis on 1/24 with 4.8 L fluid removal  Paracentesis on 3/6/23 No SBP    Continue intermittent paracentesis as needed if develops symptomatic ascites    Monitor for signs and symptoms of recurrent spontaneous bacterial peritonitis     Further treatment for recurrent ascites with TIPS deferred to his Liver Transplant team and/or Hepatology, he has an appointment on 4/21/2023 with Hepatology, Dr. Simms    Continue Tacrolimus 1 mg BID, rifaximin 550 mg BID    Continue lactulose as ordered, titrate as needed to have 2-3 bms    GI consult as needed in hospital for new acute issues, otherwise as outpatient    Encourgae high protein diet      Having bowel movements.  Continue lactulose to have 2-3 bowel movements.  CT abdomen on March 9, 2023 showed small to moderate ascites      Goals of care   As per prior rounding provider, \"on 1/25 nursing had mentioned that patient had refused to undergo dialysis and want to stop care, palliative care was consulted.  Patient and his spouse denied wanting to stop care and wanted ongoing restorative care including full code\"    Monitor, Full Code status    -Needs placement to TCU     Diarrhea, improved, on lactulose for chronic liver disease  - C difficile negative on 1/31. Secondary to lactulose.  - discontinued rectal tube (2/12) as stools more formed    Continue lactulose with goal of 2 to 3 soft bowel movement in 24 hours     Paroxysmal atrial fibrillation  Chronic anticoagulation, apixaban  # History of embolic strokes  Remains in sinus rhythm, on anticoagulation with apixaban indefinitely for stroke prophylaxis.      Monitor rhythm    Continue apixaban anticoagulation    Monitor hemoglobin, see below    Apixaban held for fistula placmenet      Acute anemia  Pt has required intermittent transfusions for on-going anemia.  " Anemia most likely secondary to critical illness/chronic disease, chronic renal disease.  Baseline hemoglobin around 7-8  Likely Epo resistance  Hb 8.4 on 3/21    # Hypercalcemia  Josue hypercalcemia of Immobility  Discussed with nephrology on 3/8/23  Hold VIT D  -Continue to hold Phos lo  Repeat Phosphorus levels in 3-4 days   Encourage ambulation   nephrology considering antiresorptive therapy if hypercalcemia becomes more severe  -Calcium lvel 9.3 on 3/22      History PEA arrest   PEA arrest prior to his previous admission and 1 episode of PEA associated with desaturation on 12/20.  No structural cardiac disease.     Stable, continue cardiac Monitoring     Chronic Hypotension  In the past has developed baseline hypotension with cirrhosis and prolonged critical illness.  On hemodialysis.  Receiving midodrine and albumin during dailysis    Continue midodrine, as per nephrology      History of seizure   After hypoxic event, in the context of baseline multifactorial encephalopathy secondary to his ongoing critical illness and prior cardiac arrests and prior strokes and cirrhosis with hepatic encephalopathy. MRI of head of 12/20/22 reveals no PRES or leptomeningeal enhancement but scattered.  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   No recent seizure activity.    Continue levetiracetam, lacosamide     Seizure precautions    Outpatient follow-up with neurology, consult inpatient if needed     # ESRD  # Anemia due to renal disease  # Hypotension during dialysis    -As per nephrology    Nephrology reviewing vein mapping to assess options for internal access placement for dialysis, see note 3/16  Underwent Fistula placement on  3/21/22   -- Vascular surgery following-continue to use for tunnel catheter.  Outpatient follow-up with vascular surgery and for will need a follow-up ultrasound of the fistula in 6 weeks to assess patency.  -Continue Eliquis       Diabetes  mellitus type 2  *Hemoglobin A1c on November 2022 was 4.7  *By report, on Lantus insulin in the past.  Blood sugar checks and sliding scale insulin previously discontinued as has been stable without insulin needs    Monitor with periodic blood sugar checks as needed      # Hypomagnesemia Resolved  Mag replacement        Severe malnutrition, protein and calorie type  Moderate dysphagia  G-tube feedings    Continue with tube feeds via PEG tube    IR replaced the PEG tube on 2/8/2023, monitor    Nutritionist consulted and following for tube feeds    SLP following as well. Oral diet as per speech and language therapy (SLP)     2/20 Nutrition following for tube feeds     Underwent video swallow on 2/23/22     -Undergoing Prn tube feed  Encourage high-protein diet  - Nutrition notes reviewed patient eating 50 to 100% and sometimes more often 100% of adequate meals.  Continue current interventions.  As needed boluses of patient consumes less than 50% of the meal  - Family encouraging patient to eat high protein diet including protein bars     Anxiety  Fluoxetine was discontinued as per psychiatry recommendation on 2/2 (see consult note for details)     Stable, monitor; comfort and reassurance offered during visit    Psychiatry follow-up         Diet: Room Service  Snacks/Supplements Adult: Other; Gelatein+ with brkfst and lunch, and magic cup with dinner (RD); With Meals  Adult Formula Bolus Feeding: Novasource Renal; Route: Gastrostomy; 3 times daily; Volume per Bolus: 240; mL(s); Continue to encourage after meal bolus at 80 mL/hr x 3 hrs, If patient consumes <50% of that meal  Combination Diet Regular Diet; Mildly Thick (level 2) (OK for fresh fruit (e.g., pineapple) per SLP)    DVT Prophylaxis: DOAC  Nixon Catheter: Not present  Lines: PRESENT      CVC Double Lumen Right External jugular Tunneled-Site Assessment: WDL  Hemodialysis Vascular Access Arteriovenous fistula Left Forearm-Site Assessment: WDL except;Other  (Comment) (healing and wrapped in dressing, warm to touch, no drainage or erythema noted)      Cardiac Monitoring: None  Code Status: Full Code      Clinically Significant Risk Factors           # Hypercalcemia: Highest Ca = 11.1 mg/dL in last 2 days, will monitor as appropriate    # Hypoalbuminemia: Lowest albumin = 1.9 g/dL at 1/23/2023  6:38 AM, will monitor as appropriate            # Severe Malnutrition: based on nutrition assessment        # Family -       Disposition Plan     Expected Discharge Date: 03/29/2023, 12:00 PM  Discharge Delays: Placement - LTC  Destination: inpatient rehabilitation facility  Discharge Comments: Dialysis pt MWF. Will need placement TCU/LTC. [facility] willing to accept once no sitter, SW to follow up when sitter free          Ashkan Hernandez MD  Hospitalist Service  [facility]  Securely message with [employer] (more info)  Text page via [employer] Paging/Directory   ______________________________________________________________________    Interval History   Patient seen and examined at bedside  Patient feels well  No complaint  Has been having delirium overnight and more impulsive  Is more confused    Physical Exam   Vital Signs: Temp: 98.4  F (36.9  C) Temp src: Oral BP: 96/59 Pulse: 78   Resp: 20 SpO2: 96 % O2 Device: None (Room air)    Weight: 177 lbs .47 oz    Physical Exam  Cardiovascular:      Rate and Rhythm: Normal rate and regular rhythm.   Pulmonary:      Effort: Pulmonary effort is normal. No respiratory distress.   Abdominal:      Palpations: Abdomen is soft.      Tenderness: There is no abdominal tenderness.      Comments: Mild distension   Musculoskeletal:      Right lower leg: No edema.      Left lower leg: No edema.   Neurological:      Mental Status: He is alert.       Medical Decision Making     Data     I have personally reviewed the following data over the past 24 hrs:    N/A  \   N/A   / N/A     135 (L) 96 (L) 77.6 (H) /  113 (H)    5.2 28 4.79 (H) \

## 2023-03-25 LAB
AMMONIA PLAS-SCNC: 69 UMOL/L (ref 16–60)
ANION GAP SERPL CALCULATED.3IONS-SCNC: 7 MMOL/L (ref 7–15)
BUN SERPL-MCNC: 63.9 MG/DL (ref 6–20)
CALCIUM SERPL-MCNC: 11.1 MG/DL (ref 8.6–10)
CHLORIDE SERPL-SCNC: 102 MMOL/L (ref 98–107)
CREAT SERPL-MCNC: 3.78 MG/DL (ref 0.67–1.17)
DEPRECATED HCO3 PLAS-SCNC: 31 MMOL/L (ref 22–29)
FERRITIN SERPL-MCNC: 184 NG/ML (ref 31–409)
GFR SERPL CREATININE-BSD FRML MDRD: 18 ML/MIN/1.73M2
GLUCOSE SERPL-MCNC: 134 MG/DL (ref 70–99)
MAGNESIUM SERPL-MCNC: 2.1 MG/DL (ref 1.7–2.3)
PHOSPHATE SERPL-MCNC: 3.6 MG/DL (ref 2.5–4.5)
POTASSIUM SERPL-SCNC: 4.4 MMOL/L (ref 3.4–5.3)
SODIUM SERPL-SCNC: 140 MMOL/L (ref 136–145)
T4 FREE SERPL-MCNC: 0.92 NG/DL (ref 0.9–1.7)
TSH SERPL DL<=0.005 MIU/L-ACNC: 4.89 UIU/ML (ref 0.3–4.2)

## 2023-03-25 PROCEDURE — 80048 BASIC METABOLIC PNL TOTAL CA: CPT | Performed by: STUDENT IN AN ORGANIZED HEALTH CARE EDUCATION/TRAINING PROGRAM

## 2023-03-25 PROCEDURE — 250N000013 HC RX MED GY IP 250 OP 250 PS 637: Performed by: STUDENT IN AN ORGANIZED HEALTH CARE EDUCATION/TRAINING PROGRAM

## 2023-03-25 PROCEDURE — 84443 ASSAY THYROID STIM HORMONE: CPT | Performed by: PSYCHIATRY & NEUROLOGY

## 2023-03-25 PROCEDURE — 84439 ASSAY OF FREE THYROXINE: CPT | Performed by: PSYCHIATRY & NEUROLOGY

## 2023-03-25 PROCEDURE — 84100 ASSAY OF PHOSPHORUS: CPT | Performed by: PSYCHIATRY & NEUROLOGY

## 2023-03-25 PROCEDURE — 250N000013 HC RX MED GY IP 250 OP 250 PS 637: Performed by: PSYCHIATRY & NEUROLOGY

## 2023-03-25 PROCEDURE — 99232 SBSQ HOSP IP/OBS MODERATE 35: CPT | Performed by: STUDENT IN AN ORGANIZED HEALTH CARE EDUCATION/TRAINING PROGRAM

## 2023-03-25 PROCEDURE — 82728 ASSAY OF FERRITIN: CPT | Performed by: PSYCHIATRY & NEUROLOGY

## 2023-03-25 PROCEDURE — 120N000001 HC R&B MED SURG/OB

## 2023-03-25 PROCEDURE — 36415 COLL VENOUS BLD VENIPUNCTURE: CPT | Performed by: STUDENT IN AN ORGANIZED HEALTH CARE EDUCATION/TRAINING PROGRAM

## 2023-03-25 PROCEDURE — 82140 ASSAY OF AMMONIA: CPT | Performed by: STUDENT IN AN ORGANIZED HEALTH CARE EDUCATION/TRAINING PROGRAM

## 2023-03-25 PROCEDURE — 83735 ASSAY OF MAGNESIUM: CPT | Performed by: STUDENT IN AN ORGANIZED HEALTH CARE EDUCATION/TRAINING PROGRAM

## 2023-03-25 PROCEDURE — 250N000012 HC RX MED GY IP 250 OP 636 PS 637: Performed by: STUDENT IN AN ORGANIZED HEALTH CARE EDUCATION/TRAINING PROGRAM

## 2023-03-25 RX ORDER — ROPINIROLE 0.25 MG/1
0.25 TABLET, FILM COATED ORAL AT BEDTIME
Status: DISCONTINUED | OUTPATIENT
Start: 2023-03-25 | End: 2023-03-26

## 2023-03-25 RX ADMIN — WHITE PETROLATUM: 1.75 OINTMENT TOPICAL at 09:17

## 2023-03-25 RX ADMIN — ROPINIROLE HYDROCHLORIDE 0.25 MG: 0.25 TABLET, FILM COATED ORAL at 21:21

## 2023-03-25 RX ADMIN — GUAIFENESIN 600 MG: 600 TABLET, EXTENDED RELEASE ORAL at 09:14

## 2023-03-25 RX ADMIN — LACTULOSE 10 G: 10 SOLUTION ORAL at 09:15

## 2023-03-25 RX ADMIN — OLANZAPINE 5 MG: 5 TABLET, ORALLY DISINTEGRATING ORAL at 09:16

## 2023-03-25 RX ADMIN — RIFAXIMIN 550 MG: 550 TABLET ORAL at 19:40

## 2023-03-25 RX ADMIN — RIFAXIMIN 550 MG: 550 TABLET ORAL at 09:14

## 2023-03-25 RX ADMIN — Medication 1 CAPSULE: at 09:14

## 2023-03-25 RX ADMIN — LEVETIRACETAM 1000 MG: 500 TABLET, FILM COATED ORAL at 21:21

## 2023-03-25 RX ADMIN — TACROLIMUS 1 MG: 1 CAPSULE ORAL at 19:41

## 2023-03-25 RX ADMIN — MIDODRINE HYDROCHLORIDE 10 MG: 5 TABLET ORAL at 13:26

## 2023-03-25 RX ADMIN — MIDODRINE HYDROCHLORIDE 10 MG: 5 TABLET ORAL at 09:15

## 2023-03-25 RX ADMIN — FOLIC ACID 1 MG: 1 TABLET ORAL at 09:15

## 2023-03-25 RX ADMIN — ACETAMINOPHEN 650 MG: 325 TABLET, FILM COATED ORAL at 09:14

## 2023-03-25 RX ADMIN — ACETAMINOPHEN 650 MG: 325 TABLET, FILM COATED ORAL at 21:21

## 2023-03-25 RX ADMIN — APIXABAN 5 MG: 5 TABLET, FILM COATED ORAL at 09:15

## 2023-03-25 RX ADMIN — GUAIFENESIN 600 MG: 600 TABLET, EXTENDED RELEASE ORAL at 19:41

## 2023-03-25 RX ADMIN — APIXABAN 5 MG: 5 TABLET, FILM COATED ORAL at 19:40

## 2023-03-25 RX ADMIN — MIDODRINE HYDROCHLORIDE 10 MG: 5 TABLET ORAL at 19:36

## 2023-03-25 RX ADMIN — LACOSAMIDE 100 MG: 100 TABLET, FILM COATED ORAL at 13:26

## 2023-03-25 RX ADMIN — OLANZAPINE 5 MG: 5 TABLET, ORALLY DISINTEGRATING ORAL at 19:41

## 2023-03-25 RX ADMIN — LACOSAMIDE 100 MG: 100 TABLET, FILM COATED ORAL at 01:03

## 2023-03-25 RX ADMIN — TACROLIMUS 1 MG: 1 CAPSULE ORAL at 09:14

## 2023-03-25 RX ADMIN — PANTOPRAZOLE SODIUM 40 MG: 40 TABLET, DELAYED RELEASE ORAL at 09:14

## 2023-03-25 RX ADMIN — RAMELTEON 8 MG: 8 TABLET, FILM COATED ORAL at 19:36

## 2023-03-25 ASSESSMENT — ACTIVITIES OF DAILY LIVING (ADL)
ADLS_ACUITY_SCORE: 61
ADLS_ACUITY_SCORE: 60
ADLS_ACUITY_SCORE: 60
ADLS_ACUITY_SCORE: 61
ADLS_ACUITY_SCORE: 59
ADLS_ACUITY_SCORE: 61
ADLS_ACUITY_SCORE: 59
ADLS_ACUITY_SCORE: 61
ADLS_ACUITY_SCORE: 59
ADLS_ACUITY_SCORE: 59

## 2023-03-25 NOTE — PROGRESS NOTES
Spiritual Plan of Care    Pt Name: Tanesha Levine  Pt : 1927  Date: 2020    Referral source: Interdisciplinary team  Reason for visit: Ritual Support  Visited with: Other( called pt RN)  Spiritual Plan of Care: Follow up if requested  Time Spent: 10    Spiritual/Emotional Assessment      In house  at [facility] checked Epic for consults to relay to remote . Pt wanted [employer] Communion.  called RN and informed RN that patient would be offered communion by volunteers because the affiliation was noted as [employer]. Consult Cleared.     Recommendations: Additional  services available prn   [facility]    Medicine Progress Note - Hospitalist Service    Date of Admission:  1/21/2023    Assessment & Plan   Blanco Osborne is a 57 year-old male admitted on 1/21/2023 as a transfer from [facility] for evaluation of loculated pleural effusion. He has extensive PMH including h/o liver transplant 2016 due to alcohol abuse, pAF on chronic anticoagulation, history of diabetes mellitus type 2, CVA, hypertension, CHRISTIAN on BiPAP.  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 He was recovering in [facility] and was transferred to [facility] for consideration of chest tube placement and possible intrapleural lysis for worsening effusion.     Acute metabolic encephalopathy with delirium, multifactorial Improving  Hepatic encephalopathy  Chronic liver disease, history of liver transplant 2016  End-stage renal disease (ESRD), on hemodialysis (HD)  History of seizure, on Keppra and Vimpat  Waxing and waning mentation, coinciding with lactulose being held at [facility]  Earlier in hospital stay, remained confused and had pulled out tracheostomy tube, PICC line and pulled his dialysis catheter.  Lines replaced.  Psychiatry consulted, recent follow-up 2/21, 2/28, see notes.    Mental status continues to fluctuate with periods of alertness and increased sleepiness, overall improving    Complicated, prolonged hospital course with mental status concerns multifactorial related to hepatic encephalopathy, end-stage renal disease on dialysis, past PEA arrest, seizure disorder, medication, and chronic liver disease with prior liver transplant.  As mental status fluctuates will continue to require one-to-one sitter as needed for added safety and supervision.  Reassess daily.    Continue to reorient and redirect  as able.  Avoid restraints if possible with goals of safety and close supervision (given multiple lines and tubes - dialysis catheter, PEG tube, IV, etc).    Maintain normal day/night cycles and sleep-wake cycles.  Minimize sedating medications as able.  Remains weak and deconditioned with complex, prolonged hospital course and limited mobility.  Encouragement and support offered daily to patient.    Continue Lactulose to 10 mg BID, monitor for symptoms; goal to have 2-3 bowel movements per day    Ongoing hemodialysis, as per nephrology, 3X/week dialysis schedule    Nephrology consult follow-up appreciated, dialysis patient    Continue ramelteon 8 mg at bedtime; monitor for side effects including AM sedation, reassess daily    Continue olanzapine (Zyprexa) 5 mg bid and as needed; monitor for side effects including sedation    Seizure disorder stable, continue Keppra and Vimpat; monitor, no recurrent seizures reported of recent    Reconsult psychiatry as needed     -Will consult neurology as mental status has been fluctuating     Follow neurology recommendation              Bilateral pleural effusions   Acute respiratory failure requiring tracheostomy  - resolved    Pneumonia  - resolved   ARDS  - resolved    Right pneumothorax - resolved   *Initial event was parainfluenza and strep pneumo pneumonia back in November 2022. Treated for ARDS. Had failed extubation x2 and tracheostomy performed on previous hospitalization. *Had additional complications of bilateral pneumothoraces as well as hydropneumothorax- pleural fluid grew candida parapsilosis  *Completed 4-week antifungal treatment. While in [facility]ACH he was successfully weaned from the ventilator.  *Recently there were concern for worsening effusion while at LTACH and had thoracentesis 01/13 which returned exudative without growth on cultures. CT chest/abdomen/pelvis on 01/18 demonstrated worsening effusions and concerns for infected parapneumonic effusions with  possible SBP.  Multiple pulmonologist at LTACH reviewed CT scan recommended transfer to [facility] for consideration of chest tube placement and possible intrapleural lysis  *Thoracic surgery (Dr. Jackson) consulted during admission   *CT chest 1/22, small effusions on imaging and so chest tube was not inserted.   ID consulted, see note 2/10.  *Patient pulled out his tracheostomy tube on the night 2/1-2/2 and is tolerating well without increased shortness of breath persistent cough or worsening hypoxia  * Chest x-ray 2/3 with cardiomegaly, chronic small bilateral pleural effusions, no pulmonary edema; right internal jugular dialysis catheter in place    3/12 Pulmonary consult, see note, concerns of hypercapnia, atelectasis, with consideration of BiPAP trial; no recommendations for antibiotics with infiltrates felt to be atelectasis    Stable, continue to monitor respiratory status, recently stable on room air; occasional cough reported    Encourage incentive spirometry as able, up to chair, deep breathing    Wound care previously consulted for tracheostomy site care, monitor for signs and symptoms of infection, healing well - resolved.    Encourage ambulation      # Splenic infract ?  Wedge shaped area on CT scan chest and CT abdomen  Hematology consulted  Discussed plan with Dr Smith on 3/9 from Hematology/oncology consult appreciated recommended supportive managment  TTE no thrombus or vegetations   No abdominal pain CTM       S/p liver transplant 2016   Chronic immunosuppression, on tacrolimus  Cirrhosis with ascites  Subacute bacterial peritonitis (SBP), resolved  *Main manifestations of this are hepatic encephalopathy and ascites.  Hepatologist at [place] felt that most likely reason for the cirrhosis was metabolic syndrome or alcohol intake.  There was no evidence of rejection on biopsy and there was some evidence of regeneration. Paracentesis done on 12/22/2022 showed lactobacillus indicating SBP, treated  "with Unasyn and Augmentin, finished 2-week course 1/12/2023.  Requires large-volume paracentesis periodically for recurring ascites.    *Paracentesis 1/13/2023 yielded about 7500 cc. Cultures NGTD. paracentesis on 1/24 with 4.8 L fluid removal  Paracentesis on 3/6/23 No SBP    Continue intermittent paracentesis as needed if develops symptomatic ascites    Monitor for signs and symptoms of recurrent spontaneous bacterial peritonitis     Further treatment for recurrent ascites with TIPS deferred to his Liver Transplant team and/or Hepatology, he has an appointment on 4/21/2023 with Hepatology, Dr. Simms    Continue Tacrolimus 1 mg BID, rifaximin 550 mg BID    Continue lactulose as ordered, titrate as needed to have 2-3 bms    GI consult as needed in hospital for new acute issues, otherwise as outpatient    Encourgae high protein diet      Having bowel movements.  Continue lactulose to have 2-3 bowel movements.  CT abdomen on March 9, 2023 showed small to moderate ascites      Goals of care   As per prior rounding provider, \"on 1/25 nursing had mentioned that patient had refused to undergo dialysis and want to stop care, palliative care was consulted.  Patient and his spouse denied wanting to stop care and wanted ongoing restorative care including full code\"    Monitor, Full Code status    -Needs placement to TCU     Diarrhea, improved, on lactulose for chronic liver disease  - C difficile negative on 1/31. Secondary to lactulose.  - discontinued rectal tube (2/12) as stools more formed    Continue lactulose with goal of 2 to 3 soft bowel movement in 24 hours     Paroxysmal atrial fibrillation  Chronic anticoagulation, apixaban  # History of embolic strokes  Remains in sinus rhythm, on anticoagulation with apixaban indefinitely for stroke prophylaxis.      Monitor rhythm    Continue apixaban anticoagulation    Monitor hemoglobin, see below    Apixaban held for fistula placmenet      Acute anemia  Pt has required " intermittent transfusions for on-going anemia.  Anemia most likely secondary to critical illness/chronic disease, chronic renal disease.  Baseline hemoglobin around 7-8  Likely Epo resistance  Hb 8.4 on 3/21    # Hypercalcemia  Dionicioley hypercalcemia of Immobility  Discussed with nephrology on 3/8/23  Hold VIT D  -Continue to hold Phos lo  Repeat Phosphorus levels in 3-4 days   Encourage ambulation   nephrology considering antiresorptive therapy if hypercalcemia becomes more severe  -Calcium lvel 9.3 on 3/22      History PEA arrest   PEA arrest prior to his previous admission and 1 episode of PEA associated with desaturation on 12/20.  No structural cardiac disease.     Stable, continue cardiac Monitoring     Chronic Hypotension  In the past has developed baseline hypotension with cirrhosis and prolonged critical illness.  On hemodialysis.  Receiving midodrine and albumin during dailysis    Continue midodrine, as per nephrology      History of seizure   After hypoxic event, in the context of baseline multifactorial encephalopathy secondary to his ongoing critical illness and prior cardiac arrests and prior strokes and cirrhosis with hepatic encephalopathy. MRI of head of 12/20/22 reveals no PRES or leptomeningeal enhancement but scattered.  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   No recent seizure activity.    Continue levetiracetam, lacosamide     Seizure precautions    Outpatient follow-up with neurology, consult inpatient if needed     # ESRD  # Anemia due to renal disease  # Hypotension during dialysis    -As per nephrology    Nephrology reviewing vein mapping to assess options for internal access placement for dialysis, see note 3/16  Underwent Fistula placement on  3/21/22   -- Vascular surgery following-continue to use for tunnel catheter.  Outpatient follow-up with vascular surgery and for will need a follow-up ultrasound of the fistula in 6 weeks to  assess patency.  -Continue Eliquis       Diabetes mellitus type 2  *Hemoglobin A1c on November 2022 was 4.7  *By report, on Lantus insulin in the past.  Blood sugar checks and sliding scale insulin previously discontinued as has been stable without insulin needs    Monitor with periodic blood sugar checks as needed      # Hypomagnesemia Resolved  Mag replacement        Severe malnutrition, protein and calorie type  Moderate dysphagia  G-tube feedings    Continue with tube feeds via PEG tube    IR replaced the PEG tube on 2/8/2023, monitor    Nutritionist consulted and following for tube feeds    SLP following as well. Oral diet as per speech and language therapy (SLP)     2/20 Nutrition following for tube feeds     Underwent video swallow on 2/23/22     -Undergoing Prn tube feed  Encourage high-protein diet  - Nutrition notes reviewed patient eating 50 to 100% and sometimes more often 100% of adequate meals.  Continue current interventions.  As needed boluses of patient consumes less than 50% of the meal  - Family encouraging patient to eat high protein diet including protein bars     Anxiety  Fluoxetine was discontinued as per psychiatry recommendation on 2/2 (see consult note for details)     Stable, monitor; comfort and reassurance offered during visit    Psychiatry follow-up          # Restless leg  Syndrome  Patient started on ropiranole by neurology       Diet: Room Service  Snacks/Supplements Adult: Other; Gelatein+ with brkfst and lunch, and magic cup with dinner (RD); With Meals  Adult Formula Bolus Feeding: Novasource Renal; Route: Gastrostomy; 3 times daily; Volume per Bolus: 240; mL(s); Continue to encourage after meal bolus at 80 mL/hr x 3 hrs, If patient consumes <50% of that meal  Combination Diet Regular Diet; Mildly Thick (level 2) (OK for fresh fruit (e.g., pineapple) per SLP)    DVT Prophylaxis: DOAC  Nixon Catheter: Not present  Lines: PRESENT      CVC Double Lumen Right External jugular  Tunneled-Site Assessment: WDL  Hemodialysis Vascular Access Arteriovenous fistula Left Forearm-Site Assessment: WDL except;Edematous      Cardiac Monitoring: None  Code Status: Full Code      Clinically Significant Risk Factors           # Hypercalcemia: Highest Ca = 11.1 mg/dL in last 2 days, will monitor as appropriate    # Hypoalbuminemia: Lowest albumin = 1.9 g/dL at 1/23/2023  6:38 AM, will monitor as appropriate            # Severe Malnutrition: based on nutrition assessment        # Family - Wife Ofe updated on 3/15/23       Disposition Plan     Expected Discharge Date: 03/29/2023, 12:00 PM  Discharge Delays: Placement - LTC  Destination: inpatient rehabilitation facility  Discharge Comments: Dialysis pt MWF. Will need placement TCU/LTC. [facility] willing to accept once no sitter, SW to follow up when sitter free          Ashkan Hernandez MD  Hospitalist Service  [facility]  Securely message with [employer] (more info)  Text page via [employer] Paging/Directory   ______________________________________________________________________    Interval History   Patient seen and examined at bedside  Is more confused    Physical Exam   Vital Signs: Temp: 97.8  F (36.6  C) Temp src: Oral BP: 117/72 Pulse: 90   Resp: 18 SpO2: 99 % O2 Device: None (Room air)    Weight: 177 lbs .47 oz    Physical Exam  Cardiovascular:      Rate and Rhythm: Normal rate and regular rhythm.   Pulmonary:      Effort: Pulmonary effort is normal. No respiratory distress.   Abdominal:      Palpations: Abdomen is soft.      Tenderness: There is no abdominal tenderness.      Comments: Mild distension   Musculoskeletal:      Right lower leg: No edema.      Left lower leg: No edema.   Neurological:      Mental Status: He is alert.       Medical Decision Making     Data     I have personally reviewed the following data over the past 24 hrs:    N/A  \   N/A   / N/A     140 102 63.9 (H) /  134 (H)   4.4 31 (H) 3.78 (H) \        TSH: 4.89 (H) T4: 0.92 A1C: N/A

## 2023-03-25 NOTE — CONSULTS
REPORT OF CONSULTATION  Patient Name: Blanco Osborne   MRN: [id number]   : 1965   Admission Date/Time: 2023  8:46 PM   Primary Care Physician: No Ref-Primary, Physician   Consulting Physician: Tita Maynard MD    REASON FOR CONSULTATION  I am asked to see this patient in consultation at the request of Ashkan Hernandez* for evaluation of encephalopathy.     HPI: This is a 57 year old male with Hx of seizure disorder (patient develoepd seizures after PEA arrest) on keppra and vimpat,  a-fib on AC, s/p liver transplant, DM2, embolic stroke,  HTN, and recent admission in 2022 due to respiratory and PEA arrest, ESRD, cirrhosis of transplanted liver, SBP and ascites who was admitted from [facility] on 3/21 due to loculated pleural effusion.   Patient noted to have fluctuating mentation which was attributed to patient not receiving lactulose at [facility], then needing HD which patient gets MWF.   Patient was seen by psychiatry on  for delirium management.     Per chart review patient was seen by neurology during his admission back in November -2022. At that time patient was in ICU after arrest and was unresponsive.  MRI brain at that time showed multifocal strokes, but no anoxic injury. Then patient was diagnosed with seizures on  after he developed GTC after PEA arrest and placed on Keppra and Vimpat. 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    Currently patient reported that at times indeed is not feeling well. Per patient's brother who was present in the room patient intermittently would get confused - would not recall that he already took his medications (or didn't take), or that he had something going on. No overt unresponsiveness or abnormal twitching of face or extremities etc. Patient's brother noted that it seems to correlate with patient not sleeping well. When asked patient confirmed that doesn't sleep well - some nights are better and some worse. Besides people  coming to his room and waking him up he also reported severe jitteriness with his legs. Patient described as abnormal sensation over his both legs that occurs when he is trying to sleep or resting. Movement helps sensations. Actually later talking to patients nurses they confirmed that noted patient at times moving his legs continuously. Hemodialysis helps with his ssx.      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     NAFLD (nonalcoholic fatty liver disease) 2/11/2016     CHRISTIAN on CPAP      Parainfluenza type 1 infection 11/19/2022     SBP (spontaneous bacterial peritonitis) (H)     [facility]     Sepsis due to Streptococcus pneumoniae with acute hypoxic respiratory failure (H) 11/19/2022     Tubular adenoma 10/2019    Large cecal adenoma- due for surveillance colonoscopy in 3 years (10/2022)        PAST SURGICAL HISTORY  Past Surgical History:   Procedure Laterality Date     APPENDECTOMY       BENCH LIVER N/A 9/20/2016    Procedure: BENCH LIVER;  Surgeon: Enoc Crews MD;  Location: [facility] OR     BRONCHOSCOPY FLEXIBLE AND RIGID N/A 12/20/2022    Procedure: BRONCHOSCOPY;  Surgeon: Alena Valenzuela MD;  Location:  GI     COLONOSCOPY  8/6/13    repeat in 2018     COLONOSCOPY N/A 10/4/2019    Procedure: COLONOSCOPY, WITH POLYPECTOMY AND BIOPSY;  Surgeon: Go Chong MD;  Location:  OR     CREATE FISTULA ARTERIOVENOUS UPPER EXTREMITY Left 3/21/2023    Procedure: LEFT UPPER EXTREMITY ARTERIOVENOUS FISTULA CREATION. LIGATION OF COMPETING VASCULAR BRANCHES- LEFT;  Surgeon: Deejay Mcneil MD;  Location:  OR     HERNIA REPAIR       IR CHEST TUBE PLACEMENT NON-TUNNELED RIGHT  12/12/2022     IR CVC TUNNEL PLACEMENT > 5 YRS OF  AGE  2022     IR GASTROSTOMY TUBE CHANGE  2023     IR GASTROSTOMY TUBE PERCUTANEOUS PLCMNT  2022     IR PARACENTESIS  2022     IR PARACENTESIS  2022     IR THORACENTESIS  2022     IR TRANSCATHETER BIOPSY  2022     TRACHEOSTOMY N/A 2022    Procedure: TRACHEOSTOMY;  Surgeon: Nilesh Jackson MD;  Location: [facility] OR     TRANSPLANT LIVER RECIPIENT  DONOR N/A 2016    Procedure: TRANSPLANT LIVER RECIPIENT  DONOR;  Surgeon: Enoc Crews MD;  Location: [facility] OR        ALLERGIES/SENSITIVITIES  No Known Allergies      CURRENT MEDS  No current outpatient medications on file.        SOCIAL HISTORY  Social History     Socioeconomic History     Marital status:      Spouse name: Not on file     Number of children: Not on file     Years of education: Not on file     Highest education level: Not on file   Occupational History     Not on file   Tobacco Use     Smoking status: Never     Smokeless tobacco: Never   Vaping Use     Vaping Use: Never used   Substance and Sexual Activity     Alcohol use: Not Currently     Alcohol/week: 0.0 standard drinks     Drug use: No     Sexual activity: Not on file   Other Topics Concern     Parent/sibling w/ CABG, MI or angioplasty before 65F 55M? Not Asked   Social History Narrative     Not on file     Social Determinants of Health     Financial Resource Strain: Not on file   Food Insecurity: Not on file   Transportation Needs: Not on file   Physical Activity: Not on file   Stress: Not on file   Social Connections: Not on file   Intimate Partner Violence: Not on file   Housing Stability: Not on file       FAMILY HISTORY  History reviewed. No pertinent family history.     REVIEW OF SYSTEMS: Systems (general, cardiovascular, respiratory, ENT/eyes, GI, , musculo-skeletal, neuro, psychiatric, allergology-immunologic and endocrine) were reviewed with pertinent positives noted in HPI and otherwise all others were negative.      EXAM: /67 (BP Location: Right arm)   Pulse 96   Temp 97.8  F (36.6  C) (Oral)   Resp 18   Wt 80.3 kg (177 lb 0.5 oz)   SpO2 99%   BMI 24.01 kg/m     General Appearance: no acute distress  HEENT: NC/AT  Neck: full passive ROM; no carotid bruits  Chest: CTAB; normal respiratory effort  Cardio: RRR; no murmurs; normal peripheral pulses  Abd: NT/ND  Mental status: Alert and oriented to person, place and time (except stated 29th instead of 25th and initially said month was 30, then stated March correctly). Speech clear and language are within normal limits.  Cranial nerves: Pupils equal, round, and reactive to light. Extraocular movements intact. Visual fields full to confrontation. Facial strength normal and sensation intact bilaterally. Palate elevation symmetric. Hearing intact. Tongue protrusion midline. Shoulder shrug symmetric.  Motor: Muscle tone and bulk are normal.  Strength within normal limits in all extremities.There was mild chin tremor as well as mild resting tremor of the right hand.   Sensory: Light touch, pin prick were normal, and vibration sensation were normal on the UEs and mildly decreased over bilateral feet.  Coordination: Finger-to-nose, heel-to-examiner's hand are performed normally.   Reflexes: Symmetric and within normal limits. Flexor plantar responses bilaterally.  Gait: deferred.     IMAGING: I have personally reviewed the patient's imaging:   IMPRESSION:  1.  No CT evidence for acute intracranial process.  2.  Brain atrophy and presumed chronic microvascular ischemic changes as above.    LABS:   Component      Latest Ref Rng & Units 3/24/2023   Sodium      136 - 145 mmol/L 135 (L)   Potassium      3.4 - 5.3 mmol/L 5.2   Chloride      98 - 107 mmol/L 96 (L)   Carbon Dioxide (CO2)      22 - 29 mmol/L 28   Anion Gap      7 - 15 mmol/L 11   Urea Nitrogen      6.0 - 20.0 mg/dL 77.6 (H)   Creatinine      0.67 - 1.17 mg/dL 4.79 (H)   Calcium      8.6 - 10.0 mg/dL 11.1 (H)    Glucose      70 - 99 mg/dL 113 (H)   GFR Estimate      >60 mL/min/1.73m2 13 (L)   Tacrolimus by Tandem Mass Spectrometry      5.0 - 15.0 ug/L 4.2 (L)   Tacrolimus Last Dose Date       3/24/2023   Tacrolimus Last Dose Time       7:42 AM   Magnesium      1.7 - 2.3 mg/dL 2.6 (H)     Component      Latest Ref Rng & Units 3/12/2023   Ammonia      16 - 60 umol/L 64 (H)     Component      Latest Ref Rng & Units 3/8/2023   Sodium      136 - 145 mmol/L 135 (L)   Potassium      3.4 - 5.3 mmol/L 4.2   Chloride      98 - 107 mmol/L 100   Carbon Dioxide (CO2)      22 - 29 mmol/L 29   Anion Gap      7 - 15 mmol/L 6 (L)   Urea Nitrogen      6.0 - 20.0 mg/dL 31.2 (H)   Creatinine      0.67 - 1.17 mg/dL 2.72 (H)   Calcium      8.6 - 10.0 mg/dL 9.9   Glucose      70 - 99 mg/dL 85   Alkaline Phosphatase      40 - 129 U/L 200 (H)   AST      10 - 50 U/L 18   ALT      10 - 50 U/L 6 (L)   Protein Total      6.4 - 8.3 g/dL 6.9   Albumin      3.5 - 5.2 g/dL 2.6 (L)   Bilirubin Total      <=1.2 mg/dL 0.4   GFR Estimate      >60 mL/min/1.73m2 26 (L)     CONSULTATION IMPRESSION/RECOMMENDATIONS:   Principal Problem:    Encephalopathy  Active Problems:    Liver transplanted (H)    Immunosuppression (H)    ESRD (end stage renal disease) on dialysis (H)    Pleural effusion     This is a 57 year old man with complex PMH which included s/p liver transplant, ESRD on HD, a-fib, pleural effusions, also prior embolic strokes and seizures who presents with fluctuating mentation  Per discussion with the patient, his brother and nurses it's concerning for RLS which is relatively common in HD patients and related to imbalance of iron and dopamine levels. It also seen in elevated phos and thyroid. Disrupted sleep is well known to alter cognition. No clear overt signs of seizures or new strokes.   Mild tremor but no other PD ssx.   -will evaluate with TSH, ferritin level and phos  -will start on requip 0.25 mg at night and see how patient is doing        Will continue to follow.   Please page with questions: pager [phone]  I thank Dr. Ashkan Hernandez for the opportunity to participate in the patient's care.     Total consult time 120 minutes with the time spent on chart review, imaging and lab results review, and more than 50 % of the time spent on coordination of cares and face-to-face time with the patient including counseling regarding pathophysiology of the above conditions, results of the tests and further directions of care.     Tita Maynard MD  [facility]

## 2023-03-25 NOTE — PLAN OF CARE
DATE & TIME: 3/25/23 7-3 pm   Cognitive Concerns/ Orientation: A & O x 2-3. More Irritable today. Restless up and down from bed to recliner to commode frequently.   BEHAVIOR & AGGRESSION TOOL COLOR: Green         ABNL VS/O2: VSS on RA  MOBILITY: Mainly A2 GB/walker; impulsive, sitter at bedside; 10 lb weight bear limit on L arm  PAIN MANAGMENT: Chronic low back pain; refused lidoderm patch; PRN Tylenol x1.   DIET: Regular diet with mildly thickened liquids.   BOWEL/BLADDER: On Hemodialysis, continent of BM-  multiple BMs.   ABNL LAB/BG: Urea Nitrogen 63.9 Cr 3.78, Ammonia 69, TSH 4.89 and Calcium 11.1.   DRAIN/DEVICES: R chest CVC, double lumen for hemodialysis; PEG tube clamped. L fistula placed 3/21/23 with bruit/thrill present.   SKIN: Dusky/jaundice in color. Scattered bruising; L forearm skin tear covered. L fistula site with sutures intact.   PROCEDURES: Dialysis MWF   D/C DATE: Pending placement to TCU, needs to not have sitter.   OTHER IMPORTANT INFO: Confusion waxes and wanes. Sitter at bedside. Neurology saw pt, see note for details.

## 2023-03-25 NOTE — PLAN OF CARE
Goal Outcome Evaluation:       [id number]  Summary: Encephalopathy  Orientation: Alert to self and place  Vitals/Tele: VSS on RA  IV Access/drains: R chest CVC, Peg tube clamped  Diet: Regular, mildly thick level 2 liquids  Mobility: Ax2 GB with beside commode   GI/: Dialysis pt  Wound/Skin: L neck skin tear, scattered bruising  Consults: Nephology. Dialysis MWF  Discharge Plan: Pending TC

## 2023-03-25 NOTE — PROVIDER NOTIFICATION
MD Hernandez messaged about pt's R upper crown/cap falling out.     Pt is not experiencing any pain and no bleeding noted.

## 2023-03-25 NOTE — PLAN OF CARE
3/24/23 , 3 p to 11 p  Prolonged hospital course with concerns of hepatic encephalopathy, end-stage renal disease on dialysis, past PEA arrest, seizure disorder, and chronic liver disease with prior liver transplant.     SUMMARY: Pleural effusion/ESRD/metabolic encephalopathy  H/O liver transplant     Orientation: Alert to self and family, waxes and wanes     Vitals/Tele: VSS on RA, PRN tylenol x 1 for generalized pain       IV Access/drains: CVC port R chest, Fistula on L arm, No IV access MD knows     Diet: Regular, mildly thickened liquids, can have sips of thin water in between meals with supervision     Mobility: A2 GB and W, short walks in room     GI/: Dialysis patient     Wound/Skin: Skin tear on L neck healing, scattered bruising BUE     Consults: Nephrology following, CC/SW      Discharge Plan: Pending TCU placement, SW following     Refused Lidocaine patch, patch free period  Dialysis Today, tolerated well. 1.5 Ltrs removed

## 2023-03-26 LAB
ANION GAP SERPL CALCULATED.3IONS-SCNC: 11 MMOL/L (ref 7–15)
BUN SERPL-MCNC: 79.2 MG/DL (ref 6–20)
CALCIUM SERPL-MCNC: 11.8 MG/DL (ref 8.6–10)
CHLORIDE SERPL-SCNC: 99 MMOL/L (ref 98–107)
CREAT SERPL-MCNC: 5.09 MG/DL (ref 0.67–1.17)
DEPRECATED HCO3 PLAS-SCNC: 27 MMOL/L (ref 22–29)
GFR SERPL CREATININE-BSD FRML MDRD: 12 ML/MIN/1.73M2
GLUCOSE SERPL-MCNC: 92 MG/DL (ref 70–99)
MAGNESIUM SERPL-MCNC: 2.2 MG/DL (ref 1.7–2.3)
POTASSIUM SERPL-SCNC: 4.7 MMOL/L (ref 3.4–5.3)
SODIUM SERPL-SCNC: 137 MMOL/L (ref 136–145)

## 2023-03-26 PROCEDURE — 250N000013 HC RX MED GY IP 250 OP 250 PS 637: Performed by: STUDENT IN AN ORGANIZED HEALTH CARE EDUCATION/TRAINING PROGRAM

## 2023-03-26 PROCEDURE — 83735 ASSAY OF MAGNESIUM: CPT | Performed by: STUDENT IN AN ORGANIZED HEALTH CARE EDUCATION/TRAINING PROGRAM

## 2023-03-26 PROCEDURE — 250N000013 HC RX MED GY IP 250 OP 250 PS 637: Performed by: PSYCHIATRY & NEUROLOGY

## 2023-03-26 PROCEDURE — 80048 BASIC METABOLIC PNL TOTAL CA: CPT | Performed by: STUDENT IN AN ORGANIZED HEALTH CARE EDUCATION/TRAINING PROGRAM

## 2023-03-26 PROCEDURE — 99207 PR NO BILLABLE SERVICE THIS VISIT: CPT | Performed by: INTERNAL MEDICINE

## 2023-03-26 PROCEDURE — 250N000012 HC RX MED GY IP 250 OP 636 PS 637: Performed by: STUDENT IN AN ORGANIZED HEALTH CARE EDUCATION/TRAINING PROGRAM

## 2023-03-26 PROCEDURE — 36415 COLL VENOUS BLD VENIPUNCTURE: CPT | Performed by: STUDENT IN AN ORGANIZED HEALTH CARE EDUCATION/TRAINING PROGRAM

## 2023-03-26 PROCEDURE — 99232 SBSQ HOSP IP/OBS MODERATE 35: CPT | Performed by: STUDENT IN AN ORGANIZED HEALTH CARE EDUCATION/TRAINING PROGRAM

## 2023-03-26 PROCEDURE — 120N000001 HC R&B MED SURG/OB

## 2023-03-26 RX ORDER — ROPINIROLE 0.25 MG/1
0.5 TABLET, FILM COATED ORAL AT BEDTIME
Status: DISCONTINUED | OUTPATIENT
Start: 2023-03-26 | End: 2023-04-28 | Stop reason: HOSPADM

## 2023-03-26 RX ADMIN — PANTOPRAZOLE SODIUM 40 MG: 40 TABLET, DELAYED RELEASE ORAL at 09:27

## 2023-03-26 RX ADMIN — TACROLIMUS 1 MG: 1 CAPSULE ORAL at 09:28

## 2023-03-26 RX ADMIN — TACROLIMUS 1 MG: 1 CAPSULE ORAL at 20:18

## 2023-03-26 RX ADMIN — LACOSAMIDE 100 MG: 100 TABLET, FILM COATED ORAL at 00:43

## 2023-03-26 RX ADMIN — RAMELTEON 8 MG: 8 TABLET, FILM COATED ORAL at 20:19

## 2023-03-26 RX ADMIN — GUAIFENESIN 600 MG: 600 TABLET, EXTENDED RELEASE ORAL at 20:19

## 2023-03-26 RX ADMIN — GUAIFENESIN 600 MG: 600 TABLET, EXTENDED RELEASE ORAL at 09:27

## 2023-03-26 RX ADMIN — ROPINIROLE HYDROCHLORIDE 0.5 MG: 0.25 TABLET, FILM COATED ORAL at 21:41

## 2023-03-26 RX ADMIN — LACOSAMIDE 100 MG: 100 TABLET, FILM COATED ORAL at 12:55

## 2023-03-26 RX ADMIN — APIXABAN 5 MG: 5 TABLET, FILM COATED ORAL at 09:27

## 2023-03-26 RX ADMIN — MIDODRINE HYDROCHLORIDE 10 MG: 5 TABLET ORAL at 09:27

## 2023-03-26 RX ADMIN — LEVETIRACETAM 1000 MG: 500 TABLET, FILM COATED ORAL at 21:41

## 2023-03-26 RX ADMIN — ACETAMINOPHEN 650 MG: 325 TABLET, FILM COATED ORAL at 09:27

## 2023-03-26 RX ADMIN — RIFAXIMIN 550 MG: 550 TABLET ORAL at 20:18

## 2023-03-26 RX ADMIN — OLANZAPINE 5 MG: 5 TABLET, ORALLY DISINTEGRATING ORAL at 20:19

## 2023-03-26 RX ADMIN — FOLIC ACID 1 MG: 1 TABLET ORAL at 09:28

## 2023-03-26 RX ADMIN — RIFAXIMIN 550 MG: 550 TABLET ORAL at 09:27

## 2023-03-26 RX ADMIN — MELATONIN 5 MG TABLET 10 MG: at 21:41

## 2023-03-26 RX ADMIN — ACETAMINOPHEN 650 MG: 325 TABLET, FILM COATED ORAL at 20:19

## 2023-03-26 RX ADMIN — MIDODRINE HYDROCHLORIDE 10 MG: 5 TABLET ORAL at 12:55

## 2023-03-26 RX ADMIN — LACTULOSE 10 G: 10 SOLUTION ORAL at 09:27

## 2023-03-26 RX ADMIN — MELATONIN TAB 3 MG 6 MG: 3 TAB at 00:43

## 2023-03-26 RX ADMIN — OLANZAPINE 5 MG: 5 TABLET, ORALLY DISINTEGRATING ORAL at 09:27

## 2023-03-26 RX ADMIN — Medication 1 CAPSULE: at 09:28

## 2023-03-26 RX ADMIN — APIXABAN 5 MG: 5 TABLET, FILM COATED ORAL at 20:19

## 2023-03-26 RX ADMIN — MIDODRINE HYDROCHLORIDE 10 MG: 5 TABLET ORAL at 18:01

## 2023-03-26 ASSESSMENT — ACTIVITIES OF DAILY LIVING (ADL)
ADLS_ACUITY_SCORE: 55
ADLS_ACUITY_SCORE: 55
ADLS_ACUITY_SCORE: 59
ADLS_ACUITY_SCORE: 55
ADLS_ACUITY_SCORE: 55
ADLS_ACUITY_SCORE: 59
ADLS_ACUITY_SCORE: 55
ADLS_ACUITY_SCORE: 59
ADLS_ACUITY_SCORE: 55
ADLS_ACUITY_SCORE: 55
ADLS_ACUITY_SCORE: 59
ADLS_ACUITY_SCORE: 55

## 2023-03-26 NOTE — PLAN OF CARE
DATE & TIME: 3/27/2023 7-3 pm   Cognitive Concerns/ Orientation: A & O x 2-3. Lethargic this shift. Sleeping for good amounts of time.   BEHAVIOR & AGGRESSION TOOL COLOR: Green         ABNL VS/O2: VSS on RA  MOBILITY: A1-2 assist GB/walker or Justyna Steady. Mobility 10 lb weight bear limit on L arm d/t new fistula for future dialysis.   PAIN MANAGMENT: Chronic low back pain; refused lidoderm patch; PRN Tylenol x1. Got Icy hot patch ordered.   DIET: Regular diet with mildly thickened liquids. Takes pills whole with fluids. Tolerating well.   BOWEL/BLADDER: On Hemodialysis, continent of BM-  multiple BMs.   ABNL LAB/BG: Urea Nitrogen 79.2, Cr 5.09, and Calcium 11.8.   DRAIN/DEVICES: R chest CVC, double lumen for hemodialysis; PEG tube clamped. L fistula placed 3/21/23 with bruit/thrill present.   SKIN: Dusky/jaundice in color. Scattered bruising; L forearm skin tear covered. L fistula site with sutures intact.   PROCEDURES: Dialysis MWF   D/C DATE: Pending placement to TCU, after need for sitter.   OTHER IMPORTANT INFO: Confusion waxes and wanes. Sitter at bedside. Neurology saw pt, see note for details. Was sleeping and complaining of being tired all shift. Stable. No changes.

## 2023-03-26 NOTE — PROGRESS NOTES
[facility]    Medicine Progress Note - Hospitalist Service    Date of Admission:  1/21/2023    Assessment & Plan   Blanco Osborne is a 57 year-old male admitted on 1/21/2023 as a transfer from [facility] for evaluation of loculated pleural effusion. He has extensive PMH including h/o liver transplant 2016 due to alcohol abuse, pAF on chronic anticoagulation, history of diabetes mellitus type 2, CVA, hypertension, CHRISTIAN on BiPAP.  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 He was recovering in [facility] and was transferred to [facility] for consideration of chest tube placement and possible intrapleural lysis for worsening effusion.     Acute metabolic encephalopathy with delirium, multifactorial Improving  Hepatic encephalopathy  Chronic liver disease, history of liver transplant 2016  End-stage renal disease (ESRD), on hemodialysis (HD)  History of seizure, on Keppra and Vimpat  Waxing and waning mentation, coinciding with lactulose being held at [facility]  Earlier in hospital stay, remained confused and had pulled out tracheostomy tube, PICC line and pulled his dialysis catheter.  Lines replaced.  Psychiatry consulted, recent follow-up 2/21, 2/28, see notes.    Mental status continues to fluctuate with periods of alertness and increased sleepiness, overall improving    Complicated, prolonged hospital course with mental status concerns multifactorial related to hepatic encephalopathy, end-stage renal disease on dialysis, past PEA arrest, seizure disorder, medication, and chronic liver disease with prior liver transplant.  As mental status fluctuates will continue to require one-to-one sitter as needed for added safety and supervision.  Reassess daily.    Continue to reorient and redirect  as able.  Avoid restraints if possible with goals of safety and close supervision (given multiple lines and tubes - dialysis catheter, PEG tube, IV, etc).    Maintain normal day/night cycles and sleep-wake cycles.  Minimize sedating medications as able.  Remains weak and deconditioned with complex, prolonged hospital course and limited mobility.  Encouragement and support offered daily to patient.    Continue Lactulose to 10 mg BID, monitor for symptoms; goal to have 2-3 bowel movements per day    Ongoing hemodialysis, as per nephrology, 3X/week dialysis schedule    Nephrology consult follow-up appreciated, dialysis patient    Continue ramelteon 8 mg at bedtime; monitor for side effects including AM sedation, reassess daily    Continue olanzapine (Zyprexa) 5 mg bid and as needed; monitor for side effects including sedation    Seizure disorder stable, continue Keppra and Vimpat; monitor, no recurrent seizures reported of recent    Reconsult psychiatry as needed       Neurology consult appreciated.  Patient has been started on ropinirole for restless leg syndrome.                  Bilateral pleural effusions   Acute respiratory failure requiring tracheostomy  - resolved    Pneumonia  - resolved   ARDS  - resolved    Right pneumothorax - resolved   *Initial event was parainfluenza and strep pneumo pneumonia back in November 2022. Treated for ARDS. Had failed extubation x2 and tracheostomy performed on previous hospitalization. *Had additional complications of bilateral pneumothoraces as well as hydropneumothorax- pleural fluid grew candida parapsilosis  *Completed 4-week antifungal treatment. While in LTACH he was successfully weaned from the ventilator.  *Recently there were concern for worsening effusion while at LTACH and had thoracentesis 01/13 which returned exudative without growth on cultures. CT chest/abdomen/pelvis on 01/18 demonstrated worsening effusions and concerns for infected parapneumonic effusions with  possible SBP.  Multiple pulmonologist at LTACH reviewed CT scan recommended transfer to [facility] for consideration of chest tube placement and possible intrapleural lysis  *Thoracic surgery (Dr. Jackson) consulted during admission   *CT chest 1/22, small effusions on imaging and so chest tube was not inserted.   ID consulted, see note 2/10.  *Patient pulled out his tracheostomy tube on the night 2/1-2/2 and is tolerating well without increased shortness of breath persistent cough or worsening hypoxia  * Chest x-ray 2/3 with cardiomegaly, chronic small bilateral pleural effusions, no pulmonary edema; right internal jugular dialysis catheter in place    3/12 Pulmonary consult, see note, concerns of hypercapnia, atelectasis, with consideration of BiPAP trial; no recommendations for antibiotics with infiltrates felt to be atelectasis    Stable, continue to monitor respiratory status, recently stable on room air; occasional cough reported    Encourage incentive spirometry as able, up to chair, deep breathing    Wound care previously consulted for tracheostomy site care, monitor for signs and symptoms of infection, healing well - resolved.    Encourage ambulation      # Splenic infract ?  Wedge shaped area on CT scan chest and CT abdomen  Hematology consulted  Discussed plan with Dr Smith on 3/9 from Hematology/oncology consult appreciated recommended supportive managment  TTE no thrombus or vegetations   No abdominal pain CTM       S/p liver transplant 2016   Chronic immunosuppression, on tacrolimus  Cirrhosis with ascites  Subacute bacterial peritonitis (SBP), resolved  *Main manifestations of this are hepatic encephalopathy and ascites.  Hepatologist at [place] felt that most likely reason for the cirrhosis was metabolic syndrome or alcohol intake.  There was no evidence of rejection on biopsy and there was some evidence of regeneration. Paracentesis done on 12/22/2022 showed lactobacillus indicating SBP, treated  "with Unasyn and Augmentin, finished 2-week course 1/12/2023.  Requires large-volume paracentesis periodically for recurring ascites.    *Paracentesis 1/13/2023 yielded about 7500 cc. Cultures NGTD. paracentesis on 1/24 with 4.8 L fluid removal  Paracentesis on 3/6/23 No SBP    Continue intermittent paracentesis as needed if develops symptomatic ascites    Monitor for signs and symptoms of recurrent spontaneous bacterial peritonitis     Further treatment for recurrent ascites with TIPS deferred to his Liver Transplant team and/or Hepatology, he has an appointment on 4/21/2023 with Hepatology, Dr. Simms    Continue Tacrolimus 1 mg BID, rifaximin 550 mg BID    Continue lactulose as ordered, titrate as needed to have 2-3 bms    GI consult as needed in hospital for new acute issues, otherwise as outpatient    Encourgae high protein diet      Having bowel movements.  Continue lactulose to have 2-3 bowel movements.  CT abdomen on March 9, 2023 showed small to moderate ascites      Goals of care   As per prior rounding provider, \"on 1/25 nursing had mentioned that patient had refused to undergo dialysis and want to stop care, palliative care was consulted.  Patient and his spouse denied wanting to stop care and wanted ongoing restorative care including full code\"    Monitor, Full Code status    -Needs placement to TCU     Diarrhea, improved, on lactulose for chronic liver disease  - C difficile negative on 1/31. Secondary to lactulose.  - discontinued rectal tube (2/12) as stools more formed    Continue lactulose with goal of 2 to 3 soft bowel movement in 24 hours     Paroxysmal atrial fibrillation  Chronic anticoagulation, apixaban  # History of embolic strokes  Remains in sinus rhythm, on anticoagulation with apixaban indefinitely for stroke prophylaxis.      Monitor rhythm    Continue apixaban anticoagulation    Monitor hemoglobin, see below    Apixaban held for fistula placmenet      Acute anemia  Pt has required " intermittent transfusions for on-going anemia.  Anemia most likely secondary to critical illness/chronic disease, chronic renal disease.  Baseline hemoglobin around 7-8  Likely Epo resistance  Hb 8.4 on 3/21    # Hypercalcemia  Likley hypercalcemia of Immobility  Discussed with nephrology on 3/8/23  Hold VIT D  -Continue to hold Phos lo  Repeat Phosphorus levels in 3-4 days   Encourage ambulation   nephrology considering antiresorptive therapy if hypercalcemia becomes more severe  -Calcium lvel 9.3 on 3/22  Calcium level 11.8 on 3/26/23      History PEA arrest   PEA arrest prior to his previous admission and 1 episode of PEA associated with desaturation on 12/20.  No structural cardiac disease.     Stable, continue cardiac Monitoring     Chronic Hypotension  In the past has developed baseline hypotension with cirrhosis and prolonged critical illness.  On hemodialysis.  Receiving midodrine and albumin during dailysis    Continue midodrine, as per nephrology      History of seizure   After hypoxic event, in the context of baseline multifactorial encephalopathy secondary to his ongoing critical illness and prior cardiac arrests and prior strokes and cirrhosis with hepatic encephalopathy. MRI of head of 12/20/22 reveals no PRES or leptomeningeal enhancement but scattered.  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   No recent seizure activity.    Continue levetiracetam, lacosamide     Seizure precautions    Outpatient follow-up with neurology, consult inpatient if needed     # ESRD  # Anemia due to renal disease  # Hypotension during dialysis    -As per nephrology    Nephrology reviewing vein mapping to assess options for internal access placement for dialysis, see note 3/16  Underwent Fistula placement on  3/21/22   -- Vascular surgery following-continue to use for tunnel catheter.  Outpatient follow-up with vascular surgery and for will need a follow-up ultrasound  of the fistula in 6 weeks to assess patency.  -Continue Eliquis       Diabetes mellitus type 2  *Hemoglobin A1c on November 2022 was 4.7  *By report, on Lantus insulin in the past.  Blood sugar checks and sliding scale insulin previously discontinued as has been stable without insulin needs    Monitor with periodic blood sugar checks as needed      # Hypomagnesemia Resolved  Mag replacement        Severe malnutrition, protein and calorie type  Moderate dysphagia  G-tube feedings    Continue with tube feeds via PEG tube    IR replaced the PEG tube on 2/8/2023, monitor    Nutritionist consulted and following for tube feeds    SLP following as well. Oral diet as per speech and language therapy (SLP)     2/20 Nutrition following for tube feeds     Underwent video swallow on 2/23/22     -Undergoing Prn tube feed  Encourage high-protein diet  - Nutrition notes reviewed patient eating 50 to 100% and sometimes more often 100% of adequate meals.  Continue current interventions.  As needed boluses of patient consumes less than 50% of the meal  - Family encouraging patient to eat high protein diet including protein bars     Anxiety  Fluoxetine was discontinued as per psychiatry recommendation on 2/2 (see consult note for details)     Stable, monitor; comfort and reassurance offered during visit    Psychiatry follow-up          # Restless leg  Syndrome  Patient started on ropiranole by neurology       Diet: Room Service  Snacks/Supplements Adult: Other; Gelatein+ with brkfst and lunch, and magic cup with dinner (RD); With Meals  Adult Formula Bolus Feeding: Novasource Renal; Route: Gastrostomy; 3 times daily; Volume per Bolus: 240; mL(s); Continue to encourage after meal bolus at 80 mL/hr x 3 hrs, If patient consumes <50% of that meal  Combination Diet Regular Diet; Mildly Thick (level 2) (OK for fresh fruit (e.g., pineapple) per SLP)    DVT Prophylaxis: DOAC  Nixon Catheter: Not present  Lines: PRESENT      CVC Double Lumen  Right External jugular Tunneled-Site Assessment: WDL  Hemodialysis Vascular Access Arteriovenous fistula Left Forearm-Site Assessment: WDL except;Edematous      Cardiac Monitoring: None  Code Status: Full Code      Clinically Significant Risk Factors           # Hypercalcemia: Highest Ca = 11.8 mg/dL in last 2 days, will monitor as appropriate    # Hypoalbuminemia: Lowest albumin = 1.9 g/dL at 1/23/2023  6:38 AM, will monitor as appropriate            # Severe Malnutrition: based on nutrition assessment        # Family - Wife Ofe updated on 3/25/23   Brother Dylan Osborne updated on 3/26/23    Disposition Plan     Expected Discharge Date: 03/29/2023, 12:00 PM  Discharge Delays: Placement - LTC  Destination: inpatient rehabilitation facility  Discharge Comments: Dialysis pt MWF. Will need placement TCU/LTC. [facility] willing to accept once no sitter, SW to follow up when sitter free          Ashkan Hernandez MD  Hospitalist Service  [facility]  Securely message with [employer] (more info)  Text page via [employer] Paging/Directory   ______________________________________________________________________    Interval History               Physical Exam   Vital Signs: Temp: 97.6  F (36.4  C) Temp src: Axillary BP: 123/73 Pulse: 91   Resp: 16 SpO2: 93 % O2 Device: None (Room air)    Weight: 177 lbs .47 oz    Physical Exam  Cardiovascular:      Rate and Rhythm: Normal rate and regular rhythm.   Pulmonary:      Effort: Pulmonary effort is normal. No respiratory distress.   Abdominal:      Palpations: Abdomen is soft.      Tenderness: There is no abdominal tenderness.      Comments: Mild distension   Musculoskeletal:      Right lower leg: No edema.      Left lower leg: No edema.   Neurological:      Mental Status: He is alert.       Medical Decision Making     Data     I have personally reviewed the following data over the past 24 hrs:    N/A  \   N/A   / N/A     137 99 79.2 (H) /  92   4.7 27  5.09 (H) \       TSH: N/A T4: N/A A1C: N/A       Ferritin:  N/A % Retic:  N/A LDH:  N/A

## 2023-03-26 NOTE — PROGRESS NOTES
Renal Medicine       Dialysis 03/27/23        Recent Labs   Lab 03/26/23  0854 03/25/23  1058   POTASSIUM 4.7 4.4           MIKEL Reilly    [facility]  [phone]

## 2023-03-26 NOTE — PROGRESS NOTES
Neurology Progress Note.    Subjective: No acute events overnight.  Today patient reported that slept somewhat better. Per RNs still was fairly restless.     Objective:  /73 (BP Location: Right arm)   Pulse 91   Temp 97.6  F (36.4  C) (Axillary)   Resp 16   Wt 80.3 kg (177 lb 0.5 oz)   SpO2 93%   BMI 24.01 kg/m    General: no acute distress  Neuro:  Exam deferred due to counseling time    I have personally reviewed all the labs and imaging studies. Pertinent findings:   Component      Latest Ref Rng & Units 3/25/2023   Ferritin      31 - 409 ng/mL 184   TSH      0.30 - 4.20 uIU/mL 4.89 (H)   Phosphorus      2.5 - 4.5 mg/dL 3.6   T4 Free      0.90 - 1.70 ng/dL 0.92       Assessment and Plan:  This is a 57 year old male with Hx of seizure disorder (patient develoepd seizures after PEA arrest) on keppra and vimpat,  a-fib on AC, s/p liver transplant, DM2, embolic stroke,  HTN, and recent admission in November 2022 due to respiratory and PEA arrest, ESRD, cirrhosis of transplanted liver, SBP and ascites who was admitted from LTACH on 3/21 due to loculated pleural effusion. Patient noted to have fluctuating mentation and consult was requested.   Per evaluation patient complained of ssx concerning for RLS and very poor sleep which per patient's brother correlated with patient's mental changes. His ammonia also elevated (69), but patient reached maximum amount of stools and wasn't getting his lactulose.   Blood work for secondary RLS was negative.   -patient was started on requip low dose 0.25 mg, will increase to 0.5 mg and see how patient is doing. Discussed that it may take some time to get effect  -discussed that he likely has disruption of his circadian rhythms, will increase melatonin to 10 mg  -continue keppra and lacosamide at the same doses    Will continue to follow.     Thank you for allowing me to participate in cares of this patient. Please contact me with any questions of concerns (pager  [phone]).     Total time of visit: 35 minutes with the time spent on chart review, imaging and lab results review, and more than 50 % of the time spent on coordination of cares and face-to-face time with the patient including counseling regarding pathophysiology of the above conditions, results of the tests and further directions of care.     Tita Maynard MD  [facility] Neurology

## 2023-03-26 NOTE — PLAN OF CARE
Goal Outcome Evaluation:         Cognitive Concerns/ Orientation: A&Ox2-3. Restless   BEHAVIOR & AGGRESSION TOOL COLOR: Green         ABNL VS/O2: VSS on RA  MOBILITY: 1-2 assist GB/walker or Justyna Steady.  10 lb weight bear limit on L arm d/t new fistula for future dialysis.   PAIN MANAGMENT: Chronic low back pain; refused lidoderm patch; PRN Tylenol x1 at bedtime.   DIET: Regular diet with mildly thickened liquids. Takes pills whole with fluids.   BOWEL/BLADDER: On Hemodialysis, continent of BM-  multiple BMs on Saturday, Lactulose held Saturday evening.   ABNL LAB/BG: Urea Nitrogen 63.9 Cr 3.78, Ammonia 69, TSH 4.89 and Calcium 11.1.   DRAIN/DEVICES: R chest CVC, double lumen for hemodialysis; PEG tube clamped. L fistula placed 3/21/23 with bruit/thrill present.   SKIN: Dusky/jaundice in color. Scattered bruising; L forearm skin tear covered. L fistula site with sutures intact.   PROCEDURES: Dialysis MWF   D/C DATE: Pending placement to TCU, after need for sitter.   OTHER IMPORTANT INFO: Confusion waxes and wanes. Sitter at bedside. Neurology saw pt, see note for details. Cooperative and calm with cares. Occasionally needs some redirection and reoriented.

## 2023-03-27 LAB
ANION GAP SERPL CALCULATED.3IONS-SCNC: 12 MMOL/L (ref 7–15)
BUN SERPL-MCNC: 88.9 MG/DL (ref 6–20)
CALCIUM SERPL-MCNC: 11.4 MG/DL (ref 8.6–10)
CHLORIDE SERPL-SCNC: 98 MMOL/L (ref 98–107)
CREAT SERPL-MCNC: 5.59 MG/DL (ref 0.67–1.17)
DEPRECATED HCO3 PLAS-SCNC: 25 MMOL/L (ref 22–29)
GFR SERPL CREATININE-BSD FRML MDRD: 11 ML/MIN/1.73M2
GLUCOSE SERPL-MCNC: 99 MG/DL (ref 70–99)
LACTATE SERPL-SCNC: 0.9 MMOL/L (ref 0.7–2)
MAGNESIUM SERPL-MCNC: 2.1 MG/DL (ref 1.7–2.3)
PHOSPHATE SERPL-MCNC: 6 MG/DL (ref 2.5–4.5)
POTASSIUM SERPL-SCNC: 5.1 MMOL/L (ref 3.4–5.3)
SODIUM SERPL-SCNC: 135 MMOL/L (ref 136–145)

## 2023-03-27 PROCEDURE — 120N000001 HC R&B MED SURG/OB

## 2023-03-27 PROCEDURE — 250N000011 HC RX IP 250 OP 636: Performed by: INTERNAL MEDICINE

## 2023-03-27 PROCEDURE — 90937 HEMODIALYSIS REPEATED EVAL: CPT

## 2023-03-27 PROCEDURE — 99232 SBSQ HOSP IP/OBS MODERATE 35: CPT | Performed by: STUDENT IN AN ORGANIZED HEALTH CARE EDUCATION/TRAINING PROGRAM

## 2023-03-27 PROCEDURE — 250N000013 HC RX MED GY IP 250 OP 250 PS 637: Performed by: STUDENT IN AN ORGANIZED HEALTH CARE EDUCATION/TRAINING PROGRAM

## 2023-03-27 PROCEDURE — 83735 ASSAY OF MAGNESIUM: CPT | Performed by: STUDENT IN AN ORGANIZED HEALTH CARE EDUCATION/TRAINING PROGRAM

## 2023-03-27 PROCEDURE — 80048 BASIC METABOLIC PNL TOTAL CA: CPT | Performed by: STUDENT IN AN ORGANIZED HEALTH CARE EDUCATION/TRAINING PROGRAM

## 2023-03-27 PROCEDURE — 250N000013 HC RX MED GY IP 250 OP 250 PS 637: Performed by: PSYCHIATRY & NEUROLOGY

## 2023-03-27 PROCEDURE — 84100 ASSAY OF PHOSPHORUS: CPT | Performed by: STUDENT IN AN ORGANIZED HEALTH CARE EDUCATION/TRAINING PROGRAM

## 2023-03-27 PROCEDURE — 258N000003 HC RX IP 258 OP 636: Performed by: INTERNAL MEDICINE

## 2023-03-27 PROCEDURE — 634N000001 HC RX 634: Performed by: INTERNAL MEDICINE

## 2023-03-27 PROCEDURE — 36415 COLL VENOUS BLD VENIPUNCTURE: CPT | Performed by: STUDENT IN AN ORGANIZED HEALTH CARE EDUCATION/TRAINING PROGRAM

## 2023-03-27 PROCEDURE — 83605 ASSAY OF LACTIC ACID: CPT | Performed by: STUDENT IN AN ORGANIZED HEALTH CARE EDUCATION/TRAINING PROGRAM

## 2023-03-27 PROCEDURE — 250N000012 HC RX MED GY IP 250 OP 636 PS 637: Performed by: STUDENT IN AN ORGANIZED HEALTH CARE EDUCATION/TRAINING PROGRAM

## 2023-03-27 RX ORDER — HEPARIN SODIUM 1000 [USP'U]/ML
500 INJECTION, SOLUTION INTRAVENOUS; SUBCUTANEOUS CONTINUOUS
Status: DISCONTINUED | OUTPATIENT
Start: 2023-03-27 | End: 2023-03-27

## 2023-03-27 RX ADMIN — RAMELTEON 8 MG: 8 TABLET, FILM COATED ORAL at 19:37

## 2023-03-27 RX ADMIN — MIDODRINE HYDROCHLORIDE 10 MG: 5 TABLET ORAL at 07:34

## 2023-03-27 RX ADMIN — MIDODRINE HYDROCHLORIDE 10 MG: 5 TABLET ORAL at 17:09

## 2023-03-27 RX ADMIN — ACETAMINOPHEN 650 MG: 325 TABLET, FILM COATED ORAL at 00:13

## 2023-03-27 RX ADMIN — APIXABAN 5 MG: 5 TABLET, FILM COATED ORAL at 07:34

## 2023-03-27 RX ADMIN — RIFAXIMIN 550 MG: 550 TABLET ORAL at 19:38

## 2023-03-27 RX ADMIN — LACOSAMIDE 100 MG: 100 TABLET, FILM COATED ORAL at 00:09

## 2023-03-27 RX ADMIN — GUAIFENESIN 600 MG: 600 TABLET, EXTENDED RELEASE ORAL at 19:36

## 2023-03-27 RX ADMIN — TACROLIMUS 1 MG: 1 CAPSULE ORAL at 19:36

## 2023-03-27 RX ADMIN — LEVETIRACETAM 1000 MG: 500 TABLET, FILM COATED ORAL at 21:45

## 2023-03-27 RX ADMIN — LACOSAMIDE 100 MG: 100 TABLET, FILM COATED ORAL at 11:39

## 2023-03-27 RX ADMIN — Medication 1 CAPSULE: at 11:39

## 2023-03-27 RX ADMIN — PANTOPRAZOLE SODIUM 40 MG: 40 TABLET, DELAYED RELEASE ORAL at 07:34

## 2023-03-27 RX ADMIN — TACROLIMUS 1 MG: 1 CAPSULE ORAL at 07:34

## 2023-03-27 RX ADMIN — LACTULOSE 10 G: 10 SOLUTION ORAL at 11:38

## 2023-03-27 RX ADMIN — SODIUM CHLORIDE 250 ML: 9 INJECTION, SOLUTION INTRAVENOUS at 09:35

## 2023-03-27 RX ADMIN — OLANZAPINE 5 MG: 5 TABLET, ORALLY DISINTEGRATING ORAL at 19:36

## 2023-03-27 RX ADMIN — HEPARIN SODIUM 500 UNITS: 1000 INJECTION INTRAVENOUS; SUBCUTANEOUS at 09:35

## 2023-03-27 RX ADMIN — RIFAXIMIN 550 MG: 550 TABLET ORAL at 07:34

## 2023-03-27 RX ADMIN — ACETAMINOPHEN 650 MG: 325 TABLET, FILM COATED ORAL at 11:43

## 2023-03-27 RX ADMIN — OLANZAPINE 5 MG: 5 TABLET, ORALLY DISINTEGRATING ORAL at 07:33

## 2023-03-27 RX ADMIN — HEPARIN SODIUM 500 UNITS/HR: 1000 INJECTION INTRAVENOUS; SUBCUTANEOUS at 09:36

## 2023-03-27 RX ADMIN — SODIUM CHLORIDE 300 ML: 9 INJECTION, SOLUTION INTRAVENOUS at 09:34

## 2023-03-27 RX ADMIN — GUAIFENESIN 600 MG: 600 TABLET, EXTENDED RELEASE ORAL at 07:34

## 2023-03-27 RX ADMIN — APIXABAN 5 MG: 5 TABLET, FILM COATED ORAL at 19:37

## 2023-03-27 RX ADMIN — WHITE PETROLATUM: 1.75 OINTMENT TOPICAL at 11:46

## 2023-03-27 RX ADMIN — EPOETIN ALFA-EPBX 20000 UNITS: 10000 INJECTION, SOLUTION INTRAVENOUS; SUBCUTANEOUS at 09:37

## 2023-03-27 RX ADMIN — ROPINIROLE HYDROCHLORIDE 0.5 MG: 0.25 TABLET, FILM COATED ORAL at 21:45

## 2023-03-27 RX ADMIN — FOLIC ACID 1 MG: 1 TABLET ORAL at 11:39

## 2023-03-27 RX ADMIN — MIDODRINE HYDROCHLORIDE 10 MG: 5 TABLET ORAL at 11:39

## 2023-03-27 ASSESSMENT — ACTIVITIES OF DAILY LIVING (ADL)
ADLS_ACUITY_SCORE: 53
ADLS_ACUITY_SCORE: 55
ADLS_ACUITY_SCORE: 58
ADLS_ACUITY_SCORE: 53
ADLS_ACUITY_SCORE: 54
ADLS_ACUITY_SCORE: 55
ADLS_ACUITY_SCORE: 53
ADLS_ACUITY_SCORE: 53
ADLS_ACUITY_SCORE: 55
ADLS_ACUITY_SCORE: 54
ADLS_ACUITY_SCORE: 53
ADLS_ACUITY_SCORE: 55

## 2023-03-27 NOTE — PLAN OF CARE
DATE & TIME: 3/26/2023 Night   Cognitive Concerns/ Orientation: Disoriented to time and situation; confused overnight, difficult to redirect  BEHAVIOR & AGGRESSION TOOL COLOR: Green         ABNL VS/O2: VSS, RA  MOBILITY: Assist-2 gait belt/walker or  Justyna Steady when fatigued; Mobility 10 lb weight bear limit on L arm d/t new fistula  PAIN MANAGMENT: Chronic low back pain; refused lidocaine patch; PRN Tylenol x1  DIET: Regular diet, mildly thickened liquids (level 2); Takes pills whole with fluids   BOWEL/BLADDER: On Hemodialysis, continent of BM-1 medium formed overnight  ABNL LAB/BG: Urea Nitrogen 79.2; Creat 5.09  DRAIN/DEVICES: R chest CVC, double lumen for hemodialysis; PEG tube clamped; L fistula placed 3/21/23 with bruit/thrill present   SKIN: Dusky/jaundice; Scattered bruising; L forearm skin tear covered; L fistula site with sutures intact   PROCEDURES: Dialysis MWF   D/C DATE: Pending placement to TCU, after need for sitter   OTHER IMPORTANT INFO: Confusion waxes and wanes; Sitter at bedside; seen by Neurology 3/26

## 2023-03-27 NOTE — PROGRESS NOTES
Renal Medicine Progress Note            Assessment/Plan:     Assessment:  1) ESRD on MWF chronic dialysis via right tunneled CVC    Blood pressure, hemodynamics/BP's appear good. Tolerated 1.5kg UF last few runs.    Seen by [facility] surgery .  S/p L arm AVF 3/21/23. RIJ catheter, MWF schedule, 3h, heparin with run.      2) Hypercalcemia, secondary to immobalization. Noted high bone turnover state, suppressed PTH.  Plan for anti-resorptive (denosumab or zoledronic acid) if gets more severe (>12.0 corrected calcium)     3) Anemia:    Somewhat epo resistant. Getting retacrit 10,000 units most runs, now escalated to 20K . Adequate iron stores with ferritin 252 and sats 26%.        Plan/Recs:  1) HD today 1.5L UF. Next HD Wed  2) EPO 20K with HD  3) s/p AVF  4) will discuss denosumab/zoledronic acid with patient's wife tomorrow given rising calcium over weekend. iCa tomorrow with AM labs       Dwayne Laboy MD   [facility] - Nephrology   [phone]          Interval History:       - no acute events overnight   - tolerated dialysis well, 1.5L UF   - confused today, asks same questions regarding renal recovery today as he did Friday   - denies complaints   - no SOB             Medications and Allergies:       - MEDICATION INSTRUCTIONS for Dialysis Patients -   Does not apply See Admin Instructions     apixaban ANTICOAGULANT  5 mg Oral BID     folic acid  1 mg Oral or Feeding Tube Daily     guaiFENesin  600 mg Oral BID     lacosamide  100 mg Oral Q12H     lactulose  10 g Oral BID     levETIRAcetam  1,000 mg Oral Q24H     lidocaine  1 patch Transdermal Q24H     lidocaine   Transdermal Q8H BIJU     menthol   Transdermal Q8H     midodrine  10 mg Oral or Feeding Tube TID w/meals     mineral oil-hydrophilic petrolatum   Topical Daily     multivitamin RENAL  1 capsule Oral Daily     [Held by provider] nystatin  500,000 Units Swish & Spit 4x Daily     OLANZapine zydis  5 mg Oral BID     pantoprazole  40 mg Oral QAM  AC     ramelteon  8 mg Oral QPM     rifaximin  550 mg Oral or Feeding Tube BID     rOPINIRole  0.5 mg Oral At Bedtime     sodium chloride (PF)  3 mL Intracatheter Q8H     tacrolimus  1 mg Oral Q12H      No Known Allergies         Physical Exam:   Vitals were reviewed  BP 97/64 (BP Location: Right arm)   Pulse 97   Temp 97.5  F (36.4  C) (Oral)   Resp 18   Wt 77.4 kg (170 lb 10.2 oz)   SpO2 97%   BMI 23.14 kg/m      Wt Readings from Last 3 Encounters:   03/27/23 77.4 kg (170 lb 10.2 oz)   01/21/23 82.4 kg (181 lb 11.2 oz)   12/28/22 96 kg (211 lb 10.3 oz)       GENERAL APPEARANCE:NAD   RESP: lungs clear ant/lat.  CV: RRR, nl S1/S2, no m/r/g   ABDOMEN: distended but soft and nontender  EXTREMITIES/SKIN: no c/c/rashes/lesions; no edema  LINES: right CVC present, no visible abnormalities.   ACCESS: L AVF, palpable thrill (very soft), audible bruit. L radial pulse intact.   NEURO: confused, disoriented               Data:     BMP  Recent Labs   Lab 03/27/23  0839 03/26/23  0854 03/25/23  1058 03/24/23  0815   * 137 140 135*   POTASSIUM 5.1 4.7 4.4 5.2   CHLORIDE 98 99 102 96*   KELLY 11.4* 11.8* 11.1* 11.1*   CO2 25 27 31* 28   BUN 88.9* 79.2* 63.9* 77.6*   CR 5.59* 5.09* 3.78* 4.79*   GLC 99 92 134* 113*     CBC  Recent Labs   Lab 03/21/23  0825   HGB 8.4*   *     Lab Results   Component Value Date    AST 20 03/14/2023    ALT 8 (L) 03/14/2023    ALKPHOS 177 (H) 03/14/2023    BILITOTAL 0.4 03/14/2023    CHANDLER 69 (H) 03/25/2023     Lab Results   Component Value Date    INR 1.36 (H) 12/21/2022       Attestation:  I have reviewed today's vital signs, notes, medications, labs and imaging.    Dwayne Laboy MD  [facility] - Nephrology

## 2023-03-27 NOTE — PLAN OF CARE
DATE & TIME: 3/27/2023 7-3 pm   Cognitive Concerns/ Orientation: A&Ox2-3. Sleeping off an on. Restless at times, in and out of bed with assist. Redirectable and reoriented as needed.   BEHAVIOR & AGGRESSION TOOL COLOR: Green         ABNL VS/O2: VSS on RA  MOBILITY: A1-2 assist GB/walker or Justyna Steady. Mobility 10 lb weight bear limit on L arm d/t new fistula for future dialysis. Moving freq.   PAIN MANAGMENT: Chronic low back pain; refused lidoderm patch and Icy/Hot patch. PRN Tylenol x1.   DIET: Regular diet with mildly thickened liquids. Takes pills whole with fluids. Tolerating well.   BOWEL/BLADDER: On Hemodialysis, continent of BM-  multiple BMs. Lactulose held this evening.   ABNL LAB/BG: Urea Nitrogen 79.2, Cr 5.09, and Calcium 11.8.   DRAIN/DEVICES: R chest CVC, double lumen for hemodialysis; PEG tube clamped. L fistula placed 3/21/23 with bruit/thrill present.   SKIN: Dusky/jaundice in color. Scattered bruising; L forearm skin tear covered. L fistula site with sutures intact.   PROCEDURES: Dialysis MWF   D/C DATE: Pending placement to TCU, after need for sitter.   OTHER IMPORTANT INFO: Confusion waxes and wanes. Sitter at bedside. Neurology saw pt, see note for details.

## 2023-03-27 NOTE — PROGRESS NOTES
Potassium   Date Value Ref Range Status   03/27/2023 5.1 3.4 - 5.3 mmol/L Final   12/29/2022 3.4 3.4 - 5.3 mmol/L Final   04/20/2020 3.9 3.4 - 5.3 mmol/L Final     Potassium POCT   Date Value Ref Range Status   01/19/2023 3.6 3.5 - 5.0 mmol/L Final     Hemoglobin   Date Value Ref Range Status   03/21/2023 8.4 (L) 13.3 - 17.7 g/dL Final   04/20/2020 14.3 13.3 - 17.7 g/dL Final     Creatinine   Date Value Ref Range Status   03/27/2023 5.59 (H) 0.67 - 1.17 mg/dL Final   04/20/2020 1.06 0.66 - 1.25 mg/dL Final     Urea Nitrogen   Date Value Ref Range Status   03/27/2023 88.9 (H) 6.0 - 20.0 mg/dL Final   12/29/2022 46 (H) 7 - 30 mg/dL Final   04/20/2020 23 7 - 30 mg/dL Final     Sodium   Date Value Ref Range Status   03/27/2023 135 (L) 136 - 145 mmol/L Final   04/20/2020 139 133 - 144 mmol/L Final     INR   Date Value Ref Range Status   12/21/2022 1.36 (H) 0.85 - 1.15 Final   10/07/2019 1.20 (H) 0.86 - 1.14 Final       DIALYSIS PROCEDURE NOTE  Hepatitis status of previous patient on machine log was checked and verified ok to use with this patients hepatitis status.  Patient dialyzed for 3 hrs. on a K2 bath with a net fluid removal of  1.5L.  A BFR of 350 ml/min was obtained via a RIJ catheter.     The treatment plan was discussed with Dr. Barboza during the treatment.    Total heparin received during the treatment: 1500 units.   Line flushed, clamped and capped with heparin 1:1000 1.9 mL (1900 units) per lumen    Meds  given: EPO 20,000units IV   Complications: Arterial collapse and blood reversed. Dr Carrasquillo notified. BP in the SBP 80's once and goal down to 1.5kg. MD notified.      Person educated: pt. Knowledge base unable to obtain. Barriers to learning: confusion at times. Educated on new access arm via verbal mode. Patient verbalized understanding. Pt prefers verbal education style.     ICEBOAT? Timeout performed pre-treatment  I: Patient was identified using 2 identifiers  C:  Consent Signed Yes  E: Equipment  preventative maintenance is current and dialysis delivery system OK to use  B: Hepatitis B Surface Antigen: neg; Draw Date: 3/1/23      Hepatitis B Surface Antibody: Succept; Draw Date: 3/9/2023  O: Dialysis orders present and complete prior to treatment  A: Vascular access verified and assessed prior to treatment  T: Treatment was performed at a clinically appropriate time  ?: Patient was allowed to ask questions and address concerns prior to treatment  See Adult Hemodialysis flowsheet in [facility] for further details and post assessment.  Machine water alarm in place and functioning. Transducer pods intact and checked every 15min.   Pt returned via bed.  Chlorine/Chloramine water system checked every 4 hours.  Outpatient Dialysis at D    Patient repositioned every 2 hours during the treatment.  Post treatment report given to AUSTIN Gaffney RN regarding 1.5L of fluid removed, last BP of 94/58, and patient pain rating of 0/10.

## 2023-03-27 NOTE — PROGRESS NOTES
[facility]    Medicine Progress Note - Hospitalist Service    Date of Admission:  1/21/2023    Assessment & Plan   Blanco Osborne is a 57 year-old male admitted on 1/21/2023 as a transfer from [facility] for evaluation of loculated pleural effusion. He has extensive PMH including h/o liver transplant 2016 due to alcohol abuse, pAF on chronic anticoagulation, history of diabetes mellitus type 2, CVA, hypertension, CHRISTIAN on BiPAP.  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 He was recovering in [facility] and was transferred to [facility] for consideration of chest tube placement and possible intrapleural lysis for worsening effusion.     Acute metabolic encephalopathy with delirium, multifactorial Improving  Hepatic encephalopathy  Chronic liver disease, history of liver transplant 2016  End-stage renal disease (ESRD), on hemodialysis (HD)  History of seizure, on Keppra and Vimpat  Waxing and waning mentation, coinciding with lactulose being held at [facility]  Earlier in hospital stay, remained confused and had pulled out tracheostomy tube, PICC line and pulled his dialysis catheter.  Lines replaced.  Psychiatry consulted, recent follow-up 2/21, 2/28, see notes.    Mental status continues to fluctuate with periods of alertness and increased sleepiness, overall improving    Complicated, prolonged hospital course with mental status concerns multifactorial related to hepatic encephalopathy, end-stage renal disease on dialysis, past PEA arrest, seizure disorder, medication, and chronic liver disease with prior liver transplant.  As mental status fluctuates will continue to require one-to-one sitter as needed for added safety and supervision.  Reassess daily.    Continue to reorient and redirect  as able.  Avoid restraints if possible with goals of safety and close supervision (given multiple lines and tubes - dialysis catheter, PEG tube, IV, etc).    Maintain normal day/night cycles and sleep-wake cycles.  Minimize sedating medications as able.  Remains weak and deconditioned with complex, prolonged hospital course and limited mobility.  Encouragement and support offered daily to patient.    Continue Lactulose to 10 mg BID, monitor for symptoms; goal to have 2-3 bowel movements per day    Ongoing hemodialysis, as per nephrology, 3X/week dialysis schedule    Nephrology consult follow-up appreciated, dialysis patient    Continue ramelteon 8 mg at bedtime; monitor for side effects including AM sedation, reassess daily    Continue olanzapine (Zyprexa) 5 mg bid and as needed; monitor for side effects including sedation    Seizure disorder stable, continue Keppra and Vimpat; monitor, no recurrent seizures reported of recent    Reconsult psychiatry as needed   - Neurology consult appreciated.  Patient has been started on ropinirole for restless leg syndrome.                  Bilateral pleural effusions   Acute respiratory failure requiring tracheostomy  - resolved    Pneumonia  - resolved   ARDS  - resolved    Right pneumothorax - resolved   *Initial event was parainfluenza and strep pneumo pneumonia back in November 2022. Treated for ARDS. Had failed extubation x2 and tracheostomy performed on previous hospitalization. *Had additional complications of bilateral pneumothoraces as well as hydropneumothorax- pleural fluid grew candida parapsilosis  *Completed 4-week antifungal treatment. While in [facility] he was successfully weaned from the ventilator.  *Recently there were concern for worsening effusion while at LTACH and had thoracentesis 01/13 which returned exudative without growth on cultures. CT chest/abdomen/pelvis on 01/18 demonstrated worsening effusions and concerns for infected parapneumonic effusions with  possible SBP.  Multiple pulmonologist at LTACH reviewed CT scan recommended transfer to [facility] for consideration of chest tube placement and possible intrapleural lysis  *Thoracic surgery (Dr. Jackson) consulted during admission   *CT chest 1/22, small effusions on imaging and so chest tube was not inserted.   ID consulted, see note 2/10.  *Patient pulled out his tracheostomy tube on the night 2/1-2/2 and is tolerating well without increased shortness of breath persistent cough or worsening hypoxia  * Chest x-ray 2/3 with cardiomegaly, chronic small bilateral pleural effusions, no pulmonary edema; right internal jugular dialysis catheter in place    3/12 Pulmonary consult, see note, concerns of hypercapnia, atelectasis, with consideration of BiPAP trial; no recommendations for antibiotics with infiltrates felt to be atelectasis    Stable, continue to monitor respiratory status, recently stable on room air; occasional cough reported    Encourage incentive spirometry as able, up to chair, deep breathing    Wound care previously consulted for tracheostomy site care, monitor for signs and symptoms of infection, healing well - resolved.    Encourage ambulation      # Splenic infract ?  Wedge shaped area on CT scan chest and CT abdomen  Hematology consulted  Discussed plan with Dr Smith on 3/9 from Hematology/oncology consult appreciated recommended supportive managment  TTE no thrombus or vegetations   No abdominal pain CTM       S/p liver transplant 2016   Chronic immunosuppression, on tacrolimus  Cirrhosis with ascites  Subacute bacterial peritonitis (SBP), resolved  *Main manifestations of this are hepatic encephalopathy and ascites.  Hepatologist at [facility] felt that most likely reason for the cirrhosis was metabolic syndrome or alcohol intake.  There was no evidence of rejection on biopsy and there was some evidence of regeneration. Paracentesis done on 12/22/2022 showed lactobacillus indicating SBP, treated  "with Unasyn and Augmentin, finished 2-week course 1/12/2023.  Requires large-volume paracentesis periodically for recurring ascites.    *Paracentesis 1/13/2023 yielded about 7500 cc. Cultures NGTD. paracentesis on 1/24 with 4.8 L fluid removal  Paracentesis on 3/6/23 No SBP    Continue intermittent paracentesis as needed if develops symptomatic ascites    Monitor for signs and symptoms of recurrent spontaneous bacterial peritonitis     Further treatment for recurrent ascites with TIPS deferred to his Liver Transplant team and/or Hepatology, he has an appointment on 4/21/2023 with Hepatology, Dr. Simms    Continue Tacrolimus 1 mg BID, rifaximin 550 mg BID    Continue lactulose as ordered, titrate as needed to have 2-3 bms    GI consult as needed in hospital for new acute issues, otherwise as outpatient    Encourgae high protein diet      Having bowel movements.  Continue lactulose to have 2-3 bowel movements.  CT abdomen on March 9, 2023 showed small to moderate ascites      Goals of care   As per prior rounding provider, \"on 1/25 nursing had mentioned that patient had refused to undergo dialysis and want to stop care, palliative care was consulted.  Patient and his spouse denied wanting to stop care and wanted ongoing restorative care including full code\"    Monitor, Full Code status    -Needs placement to TCU     Diarrhea, improved, on lactulose for chronic liver disease  - C difficile negative on 1/31. Secondary to lactulose.  - discontinued rectal tube (2/12) as stools more formed    Continue lactulose with goal of 2 to 3 soft bowel movement in 24 hours     Paroxysmal atrial fibrillation  Chronic anticoagulation, apixaban  # History of embolic strokes  Remains in sinus rhythm, on anticoagulation with apixaban indefinitely for stroke prophylaxis.      Monitor rhythm    Continue apixaban anticoagulation    Monitor hemoglobin, see below    Apixaban held for fistula placmenet      Acute anemia  Pt has required " intermittent transfusions for on-going anemia.  Anemia most likely secondary to critical illness/chronic disease, chronic renal disease.  Baseline hemoglobin around 7-8  Likely Epo resistance  Hb 8.4 on 3/21    # Hypercalcemia  Likley hypercalcemia of Immobility  Discussed with nephrology on 3/8/23  Hold VIT D  -Continue to hold Phos lo  Repeat Phosphorus levels in 3-4 days   Encourage ambulation   nephrology considering antiresorptive therapy if hypercalcemia becomes more severe  -Calcium lvel 9.3 on 3/22  Calcium level 11.4 on 3/27/23      History PEA arrest   PEA arrest prior to his previous admission and 1 episode of PEA associated with desaturation on 12/20.  No structural cardiac disease.     Stable, continue cardiac Monitoring     Chronic Hypotension  In the past has developed baseline hypotension with cirrhosis and prolonged critical illness.  On hemodialysis.  Receiving midodrine and albumin during dailysis    Continue midodrine, as per nephrology      History of seizure   After hypoxic event, in the context of baseline multifactorial encephalopathy secondary to his ongoing critical illness and prior cardiac arrests and prior strokes and cirrhosis with hepatic encephalopathy. MRI of head of 12/20/22 reveals no PRES or leptomeningeal enhancement but scattered.  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   No recent seizure activity.    Continue levetiracetam, lacosamide     Seizure precautions    Outpatient follow-up with neurology, consult inpatient if needed     # ESRD  # Anemia due to renal disease  # Hypotension during dialysis    -As per nephrology    Nephrology reviewing vein mapping to assess options for internal access placement for dialysis, see note 3/16  Underwent Fistula placement on  3/21/22   -- Vascular surgery following-continue to use for tunnel catheter.  Outpatient follow-up with vascular surgery and for will need a follow-up ultrasound  of the fistula in 6 weeks to assess patency.  -Patient phosphorous level 6 today and will discuss with nephrology     Addendum Paged nephrology ok to start sevalmer 800mg TID with meals       Diabetes mellitus type 2  *Hemoglobin A1c on November 2022 was 4.7  *By report, on Lantus insulin in the past.  Blood sugar checks and sliding scale insulin previously discontinued as has been stable without insulin needs    Monitor with periodic blood sugar checks as needed      # Hypomagnesemia Resolved  Mag replacement        Severe malnutrition, protein and calorie type  Moderate dysphagia  G-tube feedings    Continue with tube feeds via PEG tube    IR replaced the PEG tube on 2/8/2023, monitor    Nutritionist consulted and following for tube feeds    SLP following as well. Oral diet as per speech and language therapy (SLP)     2/20 Nutrition following for tube feeds     Underwent video swallow on 2/23/22     -Undergoing Prn tube feed  Encourage high-protein diet  - Nutrition notes reviewed patient eating 50 to 100% and sometimes more often 100% of adequate meals.  Continue current interventions.  As needed boluses of patient consumes less than 50% of the meal  - Family encouraging patient to eat high protein diet including protein bars     Anxiety  Fluoxetine was discontinued as per psychiatry recommendation on 2/2 (see consult note for details)     Stable, monitor; comfort and reassurance offered during visit    Psychiatry follow-up          # Restless leg  Syndrome  Patient started on ropiranole by neurology      # Family Wife Ofe and Brother Dylan Osborne updated on 3/27/23     Diet: Room Service  Snacks/Supplements Adult: Other; Gelatein+ with brkfst and lunch, and magic cup with dinner (RD); With Meals  Adult Formula Bolus Feeding: Novasource Renal; Route: Gastrostomy; 3 times daily; Volume per Bolus: 240; mL(s); Continue to encourage after meal bolus at 80 mL/hr x 3 hrs, If patient consumes <50% of that  meal  Combination Diet Regular Diet; Mildly Thick (level 2) (OK for fresh fruit (e.g., pineapple) per SLP)    DVT Prophylaxis: DOAC  Nixon Catheter: Not present  Lines: PRESENT      CVC Double Lumen Right External jugular Tunneled-Site Assessment: WDL  Hemodialysis Vascular Access Arteriovenous fistula Left Forearm-Site Assessment: WDL;Thrill present;Bruit present      Cardiac Monitoring: None  Code Status: Full Code      Clinically Significant Risk Factors           # Hypercalcemia: Highest Ca = 11.8 mg/dL in last 2 days, will monitor as appropriate    # Hypoalbuminemia: Lowest albumin = 1.9 g/dL at 1/23/2023  6:38 AM, will monitor as appropriate            # Severe Malnutrition: based on nutrition assessment        # Family - Wife Ofe updated on 3/25/23   Brother Dylan Osborne updated on 3/26/23    Disposition Plan      Expected Discharge Date: 04/05/2023, 12:00 PM  Discharge Delays: Placement - LTC  Destination: inpatient rehabilitation facility  Discharge Comments: Dialysis pt MWF. Will need placement TCU/LTC. [facility] willing to accept once no sitter, SW to follow up when sitter free          Ashkan Hernandez MD  Hospitalist Service  [facility]  Securely message with [employer] (more info)  Text page via [employer] Paging/Directory   ______________________________________________________________________    Interval History     Patient seen and examined at bedside.  Wife and brother present at bedside.  Patient has been sleeping.  Patient has been ambulating well.  Still confused and impulsive.          Physical Exam   Vital Signs: Temp: 97.5  F (36.4  C) Temp src: Oral BP: 97/64 Pulse: 97   Resp: 18 SpO2: 97 % O2 Device: None (Room air)    Weight: 170 lbs 10.18 oz    Physical Exam  Constitutional:       Comments: Sleeping   Pulmonary:      Effort: Pulmonary effort is normal. No respiratory distress.       Medical Decision Making     Data     I have personally reviewed the following  data over the past 24 hrs:    N/A  \   N/A   / N/A     135 (L) 98 88.9 (H) /  99   5.1 25 5.59 (H) \       Procal: N/A CRP: N/A Lactic Acid: 0.9

## 2023-03-27 NOTE — PLAN OF CARE
Goal Outcome Evaluation:      DATE & TIME: 3/27/23 [id number]  Cognitive Concerns/ Orientation: Disoriented to time and situation; confused overnight, difficult to redirect  BEHAVIOR & AGGRESSION TOOL COLOR: Green         ABNL VS/O2: VSS, RA  MOBILITY: Assist-2 gait belt/walker. 10 lb weight bear limit on L arm d/t new fistula.  Up in chair and ambulated t[place]on.   PAIN MANAGMENT: Chronic low back pain; refused both lidocaine and Icyhot patches; tylenol given with some relief and heat packs.  DIET: Regular diet, mildly thickened liquids (level 2); takes pills without difficulty  BOWEL/BLADDER: On Hemodialysis, had 1 formed BM  ABNL LAB/BG: BUN 88.9; Creat 24.1; Hgb 10.6  DRAIN/DEVICES: R chest CVC, double lumen for hemodialysis; PEG tube clamped; L fistula placed 3/21/23 with strong bruit/thrill present   SKIN: Mild jaundice; Scattered bruising; L forearm skin tear covered; L fistula site with sutures intact   PROCEDURES: Dialysis MWF - completed today for 1.5 liter removed.    D/C DATE: Pending placement to TCU  OTHER IMPORTANT INFO: patient refused CHG wipes to skin

## 2023-03-28 ENCOUNTER — APPOINTMENT (OUTPATIENT)
Dept: PHYSICAL THERAPY | Facility: CLINIC | Age: 58
DRG: 981 | End: 2023-03-28
Attending: STUDENT IN AN ORGANIZED HEALTH CARE EDUCATION/TRAINING PROGRAM
Payer: COMMERCIAL

## 2023-03-28 LAB
ALBUMIN SERPL BCG-MCNC: 3 G/DL (ref 3.5–5.2)
ANION GAP SERPL CALCULATED.3IONS-SCNC: 13 MMOL/L (ref 7–15)
BUN SERPL-MCNC: 66.9 MG/DL (ref 6–20)
CA-I BLD-MCNC: 5.8 MG/DL (ref 4.4–5.2)
CALCIUM SERPL-MCNC: 11.1 MG/DL (ref 8.6–10)
CHLORIDE SERPL-SCNC: 100 MMOL/L (ref 98–107)
CREAT SERPL-MCNC: 4.54 MG/DL (ref 0.67–1.17)
DEPRECATED HCO3 PLAS-SCNC: 25 MMOL/L (ref 22–29)
GFR SERPL CREATININE-BSD FRML MDRD: 14 ML/MIN/1.73M2
GLUCOSE SERPL-MCNC: 121 MG/DL (ref 70–99)
MAGNESIUM SERPL-MCNC: 2 MG/DL (ref 1.7–2.3)
POTASSIUM SERPL-SCNC: 4.7 MMOL/L (ref 3.4–5.3)
SODIUM SERPL-SCNC: 138 MMOL/L (ref 136–145)

## 2023-03-28 PROCEDURE — 250N000013 HC RX MED GY IP 250 OP 250 PS 637: Performed by: STUDENT IN AN ORGANIZED HEALTH CARE EDUCATION/TRAINING PROGRAM

## 2023-03-28 PROCEDURE — 97110 THERAPEUTIC EXERCISES: CPT | Mod: GP

## 2023-03-28 PROCEDURE — 82330 ASSAY OF CALCIUM: CPT | Performed by: STUDENT IN AN ORGANIZED HEALTH CARE EDUCATION/TRAINING PROGRAM

## 2023-03-28 PROCEDURE — 83735 ASSAY OF MAGNESIUM: CPT | Performed by: HOSPITALIST

## 2023-03-28 PROCEDURE — 82040 ASSAY OF SERUM ALBUMIN: CPT | Performed by: STUDENT IN AN ORGANIZED HEALTH CARE EDUCATION/TRAINING PROGRAM

## 2023-03-28 PROCEDURE — 250N000013 HC RX MED GY IP 250 OP 250 PS 637: Performed by: PSYCHIATRY & NEUROLOGY

## 2023-03-28 PROCEDURE — 99231 SBSQ HOSP IP/OBS SF/LOW 25: CPT | Performed by: HOSPITALIST

## 2023-03-28 PROCEDURE — 120N000001 HC R&B MED SURG/OB

## 2023-03-28 PROCEDURE — 36415 COLL VENOUS BLD VENIPUNCTURE: CPT | Performed by: STUDENT IN AN ORGANIZED HEALTH CARE EDUCATION/TRAINING PROGRAM

## 2023-03-28 PROCEDURE — 250N000012 HC RX MED GY IP 250 OP 636 PS 637: Performed by: STUDENT IN AN ORGANIZED HEALTH CARE EDUCATION/TRAINING PROGRAM

## 2023-03-28 PROCEDURE — 99231 SBSQ HOSP IP/OBS SF/LOW 25: CPT | Performed by: STUDENT IN AN ORGANIZED HEALTH CARE EDUCATION/TRAINING PROGRAM

## 2023-03-28 PROCEDURE — 97116 GAIT TRAINING THERAPY: CPT | Mod: GP

## 2023-03-28 PROCEDURE — 80048 BASIC METABOLIC PNL TOTAL CA: CPT | Performed by: STUDENT IN AN ORGANIZED HEALTH CARE EDUCATION/TRAINING PROGRAM

## 2023-03-28 RX ORDER — LIDOCAINE 4 G/G
1 PATCH TOPICAL
Status: DISCONTINUED | OUTPATIENT
Start: 2023-03-28 | End: 2023-04-28 | Stop reason: HOSPADM

## 2023-03-28 RX ORDER — SEVELAMER CARBONATE 800 MG/1
800 TABLET, FILM COATED ORAL
Status: DISCONTINUED | OUTPATIENT
Start: 2023-03-28 | End: 2023-04-28 | Stop reason: HOSPADM

## 2023-03-28 RX ADMIN — MIDODRINE HYDROCHLORIDE 10 MG: 5 TABLET ORAL at 17:33

## 2023-03-28 RX ADMIN — MELATONIN 5 MG TABLET 10 MG: at 23:25

## 2023-03-28 RX ADMIN — MIDODRINE HYDROCHLORIDE 10 MG: 5 TABLET ORAL at 09:23

## 2023-03-28 RX ADMIN — TACROLIMUS 1 MG: 1 CAPSULE ORAL at 09:25

## 2023-03-28 RX ADMIN — PANTOPRAZOLE SODIUM 40 MG: 40 TABLET, DELAYED RELEASE ORAL at 09:24

## 2023-03-28 RX ADMIN — LACOSAMIDE 100 MG: 100 TABLET, FILM COATED ORAL at 23:25

## 2023-03-28 RX ADMIN — APIXABAN 5 MG: 5 TABLET, FILM COATED ORAL at 09:24

## 2023-03-28 RX ADMIN — LACOSAMIDE 100 MG: 100 TABLET, FILM COATED ORAL at 00:14

## 2023-03-28 RX ADMIN — WHITE PETROLATUM: 1.75 OINTMENT TOPICAL at 09:25

## 2023-03-28 RX ADMIN — SEVELAMER CARBONATE 800 MG: 800 TABLET, FILM COATED ORAL at 09:23

## 2023-03-28 RX ADMIN — GUAIFENESIN 600 MG: 600 TABLET, EXTENDED RELEASE ORAL at 20:44

## 2023-03-28 RX ADMIN — GUAIFENESIN 600 MG: 600 TABLET, EXTENDED RELEASE ORAL at 09:24

## 2023-03-28 RX ADMIN — LACOSAMIDE 100 MG: 100 TABLET, FILM COATED ORAL at 13:37

## 2023-03-28 RX ADMIN — OLANZAPINE 5 MG: 5 TABLET, ORALLY DISINTEGRATING ORAL at 09:24

## 2023-03-28 RX ADMIN — SEVELAMER CARBONATE 800 MG: 800 TABLET, FILM COATED ORAL at 17:33

## 2023-03-28 RX ADMIN — ROPINIROLE HYDROCHLORIDE 0.5 MG: 0.25 TABLET, FILM COATED ORAL at 23:25

## 2023-03-28 RX ADMIN — MIDODRINE HYDROCHLORIDE 10 MG: 5 TABLET ORAL at 13:37

## 2023-03-28 RX ADMIN — Medication 1 CAPSULE: at 09:23

## 2023-03-28 RX ADMIN — RAMELTEON 8 MG: 8 TABLET, FILM COATED ORAL at 20:43

## 2023-03-28 RX ADMIN — FOLIC ACID 1 MG: 1 TABLET ORAL at 09:24

## 2023-03-28 RX ADMIN — LEVETIRACETAM 1000 MG: 500 TABLET, FILM COATED ORAL at 23:25

## 2023-03-28 RX ADMIN — TACROLIMUS 1 MG: 1 CAPSULE ORAL at 20:43

## 2023-03-28 RX ADMIN — ACETAMINOPHEN 650 MG: 325 TABLET, FILM COATED ORAL at 09:24

## 2023-03-28 RX ADMIN — LACTULOSE 10 G: 10 SOLUTION ORAL at 09:26

## 2023-03-28 RX ADMIN — ACETAMINOPHEN 650 MG: 325 TABLET, FILM COATED ORAL at 20:43

## 2023-03-28 RX ADMIN — RIFAXIMIN 550 MG: 550 TABLET ORAL at 20:44

## 2023-03-28 RX ADMIN — APIXABAN 5 MG: 5 TABLET, FILM COATED ORAL at 20:44

## 2023-03-28 RX ADMIN — SEVELAMER CARBONATE 800 MG: 800 TABLET, FILM COATED ORAL at 13:36

## 2023-03-28 RX ADMIN — OLANZAPINE 5 MG: 5 TABLET, ORALLY DISINTEGRATING ORAL at 20:44

## 2023-03-28 RX ADMIN — RIFAXIMIN 550 MG: 550 TABLET ORAL at 09:23

## 2023-03-28 ASSESSMENT — ACTIVITIES OF DAILY LIVING (ADL)
ADLS_ACUITY_SCORE: 55
ADLS_ACUITY_SCORE: 54
ADLS_ACUITY_SCORE: 54
ADLS_ACUITY_SCORE: 55
ADLS_ACUITY_SCORE: 54
ADLS_ACUITY_SCORE: 55
ADLS_ACUITY_SCORE: 54
ADLS_ACUITY_SCORE: 54
ADLS_ACUITY_SCORE: 55

## 2023-03-28 NOTE — PROGRESS NOTES
Renal Medicine Progress Note            Assessment/Plan:     Assessment:  1) ESRD on MWF chronic dialysis via right tunneled CVC    Blood pressure, hemodynamics/BP's appear good. Tolerated 1.5kg UF last few runs.    Seen by [facility] surgery .  S/p L arm AVF 3/21/23. RIJ catheter, MWF schedule, 3h, heparin with run.      2) Hypercalcemia, secondary to immobalization. Noted high bone turnover state, suppressed PTH.  Plan for anti-resorptive (denosumab or zoledronic acid) if gets more severe (>12.0 corrected calcium)     3) Anemia:    Somewhat epo resistant. Getting retacrit 10,000 units most runs, now escalated to 20K . Adequate iron stores with ferritin 252 and sats 26%.        Plan/Recs:  1) HD today 1.5L UF. Next HD Wed  2) EPO 20K with HD  3) s/p AVF  4) will discuss denosumab/zoledronic acid with patient's wife tomorrow given rising calcium over weekend. iCa ok, corrected calcium today 11.9 (borderline). Will assess trend oer this week to determine if these meds are needed. Called wife x2 today without answer      Dwayne Laboy MD   [facility] - Nephrology   [phone]          Interval History:       - no acute events overnight   - remains confused   - tolerated dialysis well   - denies SOB  - states he wants to be left alone            Medications and Allergies:       - MEDICATION INSTRUCTIONS for Dialysis Patients -   Does not apply See Admin Instructions     apixaban ANTICOAGULANT  5 mg Oral BID     folic acid  1 mg Oral or Feeding Tube Daily     guaiFENesin  600 mg Oral BID     lacosamide  100 mg Oral Q12H     lactulose  10 g Oral BID     levETIRAcetam  1,000 mg Oral Q24H     lidocaine   Transdermal Q8H BIJU     menthol   Transdermal Q8H     midodrine  10 mg Oral or Feeding Tube TID w/meals     mineral oil-hydrophilic petrolatum   Topical Daily     multivitamin RENAL  1 capsule Oral Daily     OLANZapine zydis  5 mg Oral BID     pantoprazole  40 mg Oral QAM AC     ramelteon  8 mg Oral QPM      rifaximin  550 mg Oral or Feeding Tube BID     rOPINIRole  0.5 mg Oral At Bedtime     sevelamer carbonate  800 mg Oral TID w/meals     sodium chloride (PF)  3 mL Intracatheter Q8H     tacrolimus  1 mg Oral Q12H      No Known Allergies         Physical Exam:   Vitals were reviewed  /76 (BP Location: Right arm)   Pulse 79   Temp 97.4  F (36.3  C) (Axillary)   Resp 18   Wt 77.4 kg (170 lb 10.2 oz)   SpO2 97%   BMI 23.14 kg/m      Wt Readings from Last 3 Encounters:   03/27/23 77.4 kg (170 lb 10.2 oz)   01/21/23 82.4 kg (181 lb 11.2 oz)   12/28/22 96 kg (211 lb 10.3 oz)     Declined exam  Appears confused but NAD   Laying flat in bed               Data:     BMP  Recent Labs   Lab 03/28/23  0958 03/27/23  0839 03/26/23  0854 03/25/23  1058    135* 137 140   POTASSIUM 4.7 5.1 4.7 4.4   CHLORIDE 100 98 99 102   KELLY 11.1* 11.4* 11.8* 11.1*   CO2 25 25 27 31*   BUN 66.9* 88.9* 79.2* 63.9*   CR 4.54* 5.59* 5.09* 3.78*   * 99 92 134*     CBC  No lab results found in last 7 days.  Lab Results   Component Value Date    AST 20 03/14/2023    ALT 8 (L) 03/14/2023    ALKPHOS 177 (H) 03/14/2023    BILITOTAL 0.4 03/14/2023    CHANDLER 69 (H) 03/25/2023     Lab Results   Component Value Date    INR 1.36 (H) 12/21/2022       Attestation:  I have reviewed today's vital signs, notes, medications, labs and imaging.    Dwayne Laboy MD  [facility] - Nephrology

## 2023-03-28 NOTE — PLAN OF CARE
Goal Outcome Evaluation:    DATE & TIME: 3/27/23 9652-4983  Cognitive Concerns/ Orientation: Disoriented to time and situation; confused overnight, difficult to redirect  BEHAVIOR & AGGRESSION TOOL COLOR: Green         ABNL VS/O2: VSS, RA  MOBILITY: Assist-2 gait belt/walker. 10 lb weight bear limit on L arm d/t new fistula.  Up in chair and ambulated [place].   PAIN MANAGMENT: Chronic low back pain; refused both lidocaine and Icyhot patches.  DIET: Regular diet, mildly thickened liquids (level 2); takes pills without difficulty  BOWEL/BLADDER: On Hemodialysis, had 2 formed BM  ABNL LAB/BG: Urea nitrogen 88.9; Creat 5.59; Hgb 10.6  DRAIN/DEVICES: R chest CVC, double lumen for hemodialysis; PEG tube clamped; L fistula placed 3/21/23 with strong bruit/thrill present   SKIN: Mild jaundice; Scattered bruising; L forearm skin tear covered; L fistula site with sutures intact   PROCEDURES: Dialysis MWF - completed today for 1.5 liter removed.    D/C DATE: Pending placement to TCU  OTHER IMPORTANT INFO: patient refused CHG wipes to skin

## 2023-03-28 NOTE — PLAN OF CARE
Goal Outcome Evaluation:     DATE & TIME: 3/27/23 4366-3724  Cognitive Concerns/ Orientation: Disoriented to time and situation; confused overnight, difficult to redirect  BEHAVIOR & AGGRESSION TOOL COLOR: Green         ABNL VS/O2: VSS, RA  MOBILITY: Assist-2 gait belt/walker. 10 lb weight bear limit on L arm d/t new fistula.   PAIN MANAGMENT: Chronic low back pain; refused both lidocaine and Icyhot patches.  DIET: Regular diet, mildly thickened liquids (level 2); takes pills without difficulty  BOWEL/BLADDER: On Hemodialysis.  ABNL LAB/BG: Urea nitrogen 88.9; Creat 5.59; Hgb 10.6  DRAIN/DEVICES: R chest CVC, double lumen for hemodialysis; PEG tube clamped; L fistula placed 3/21/23 with strong bruit/thrill present   SKIN: Mild jaundice; Scattered bruising; L forearm skin tear covered; L fistula site with sutures intact   PROCEDURES: Dialysis MWF - completed today for 1.5 liter removed.    D/C DATE: Pending placement to TCU  OTHER IMPORTANT INFO: patient refused CHG wipes to skin

## 2023-03-28 NOTE — PLAN OF CARE
Goal Outcome Evaluation:      Plan of Care Reviewed With: patient    Overall Patient Progress: no changeOverall Patient Progress: no change         DATE & TIME: 3/28/23   Cognitive Concerns/ Orientation: Disoriented to time, place and situation, slow to respond at times.  BEHAVIOR & AGGRESSION TOOL COLOR: Green         ABNL VS/O2: VSS on RA  MOBILITY: Assist-2 gait belt/walker. 10 lb weight bear limit on L arm d/t new fistula. Turn/repos in bed, declined sitting in chair.   PAIN MANAGMENT: Chronic low back pain; refused both lidocaine and Icyhot patches.    DIET: Regular diet, mildly thickened liquids (level 2); takes pills without difficulty, poor appetite. Family says he ate protein snacks from home.   BOWEL/BLADDER:Hemodialysis. No BM.   ABNL LAB/BG: Ionized Ca 5.8, albumin 3.0  DRAIN/DEVICES: R chest CVC, double lumen for hemodialysis; PEG tube clamped; L fistula placed 3/21/23 with strong bruit/thrill present.  SKIN: Mild jaundice/pale; Scattered bruising; L forearm skin tear covered; L fistula site with sutures intact   PROCEDURES: Dialysis MWF. Next 3/29.  D/C DATE: Pending placement to TCU.  OTHER IMPORTANT INFO: Family at bedside. Resting between cares.   Please follow up with Dr. Bryan for contact lens fitting.

## 2023-03-28 NOTE — PROGRESS NOTES
[facility]    Medicine Progress Note - Hospitalist Service    Date of Admission:  1/21/2023    Assessment & Plan   Blanco Osborne is a 57 year-old male admitted on 1/21/2023 as a transfer from [facility] for evaluation of loculated pleural effusion. He has extensive PMH including h/o liver transplant 2016 due to alcohol abuse, pAF on chronic anticoagulation, history of diabetes mellitus type 2, CVA, hypertension, CHRISTIAN on BiPAP.  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 He was recovering in [facility] and was transferred to [facility] for consideration of chest tube placement and possible intrapleural lysis for worsening effusion.     Acute metabolic encephalopathy with delirium, multifactorial Improving  Hepatic encephalopathy  Chronic liver disease, history of liver transplant 2016  End-stage renal disease (ESRD), on hemodialysis (HD)  History of seizure, on Keppra and Vimpat  Waxing and waning mentation, coinciding with lactulose being held at [facility]  Earlier in hospital stay, remained confused and had pulled out tracheostomy tube, PICC line and pulled his dialysis catheter.  Lines replaced.  Psychiatry consulted, recent follow-up 2/21, 2/28, see notes.    Mental status continues to fluctuate with periods of alertness and increased sleepiness, overall improving.    Complicated, prolonged hospital course with mental status concerns multifactorial related to hepatic encephalopathy, end-stage renal disease on dialysis, past PEA arrest, seizure disorder, medication, and chronic liver disease with prior liver transplant.  As mental status fluctuates will continue to require one-to-one sitter as needed for added safety and supervision.  Reassess daily.    Continue to reorient and redirect  as able.  Avoid restraints if possible with goals of safety and close supervision (given multiple lines and tubes - dialysis catheter, PEG tube, IV, etc).    Maintain normal day/night cycles and sleep-wake cycles.  Minimize sedating medications as able.  Remains weak and deconditioned with complex, prolonged hospital course and limited mobility.  Encouragement and support offered daily to patient.    Continue Lactulose to 10 mg BID, monitor for symptoms; goal to have 2-3 bowel movements per day.    Ongoing hemodialysis, as per nephrology, 3X/week dialysis schedule.    Nephrology consult follow-up appreciated, dialysis patient.    Continue ramelteon 8 mg at bedtime; monitor for side effects including AM sedation, reassess daily.    Continue olanzapine (Zyprexa) 5 mg bid and as needed; monitor for side effects including sedation.    Seizure disorder stable, continue Keppra and Vimpat; monitor, no recurrent seizures reported of recent.    Reconsult psychiatry as needed.  Neurology consult appreciated.  Patient has been started on ropinirole for restless leg syndrome.     Bilateral pleural effusions   Acute respiratory failure requiring tracheostomy  - resolved    Pneumonia  - resolved   ARDS  - resolved    Right pneumothorax - resolved   *Initial event was parainfluenza and strep pneumo pneumonia back in November 2022. Treated for ARDS. Had failed extubation x2 and tracheostomy performed on previous hospitalization. *Had additional complications of bilateral pneumothoraces as well as hydropneumothorax- pleural fluid grew candida parapsilosis  *Completed 4-week antifungal treatment. While in [facility] he was successfully weaned from the ventilator.  *Recently there were concern for worsening effusion while at LTACH and had thoracentesis 01/13 which returned exudative without growth on cultures. CT chest/abdomen/pelvis on 01/18 demonstrated worsening effusions and concerns for infected parapneumonic effusions with  possible SBP.  Multiple pulmonologist at [facility] reviewed CT scan recommended transfer to [facility] for consideration of chest tube placement and possible intrapleural lysis  *Thoracic surgery (Dr. Jackson) consulted during admission   *CT chest 1/22, small effusions on imaging and so chest tube was not inserted.   ID consulted, see note 2/10.  *Patient pulled out his tracheostomy tube on the night 2/1-2/2 and is tolerating well without increased shortness of breath persistent cough or worsening hypoxia  * Chest x-ray 2/3 with cardiomegaly, chronic small bilateral pleural effusions, no pulmonary edema; right internal jugular dialysis catheter in place    3/12 Pulmonary consult, see note, concerns of hypercapnia, atelectasis, with consideration of BiPAP trial; no recommendations for antibiotics with infiltrates felt to be atelectasis    Stable, continue to monitor respiratory status, recently stable on room air; occasional cough reported.    Encourage incentive spirometry as able, up to chair, deep breathing.    Wound care previously consulted for tracheostomy site care, monitor for signs and symptoms of infection, healing well - resolved.    Encourage ambulation.    Possible splenic infract  Wedge shaped area on CT scan chest and CT abdomen on 3/7/23.    Hematology consulted, suspicion for infarct was low.    TTE no thrombus or vegetations.    No abdominal pain CTM.     S/p liver transplant 2016   Chronic immunosuppression, on tacrolimus  Cirrhosis with ascites  Subacute bacterial peritonitis (SBP), resolved  *Main manifestations of this are hepatic encephalopathy and ascites.  Hepatologist at [place] felt that most likely reason for the cirrhosis was metabolic syndrome or alcohol intake.  There was no evidence of rejection on biopsy and there was some evidence of regeneration. Paracentesis done on 12/22/2022 showed lactobacillus indicating SBP, treated with Unasyn and Augmentin, finished 2-week course 1/12/2023.  " Requires large-volume paracentesis periodically for recurring ascites.    *Paracentesis 1/13/2023 yielded about 7500 cc. Cultures NGTD. paracentesis on 1/24 with 4.8 L fluid removal  Paracentesis on 3/6/23 No SBP    Continue intermittent paracentesis as needed if develops symptomatic ascites.    Monitor for signs and symptoms of recurrent spontaneous bacterial peritonitis.    Further treatment for recurrent ascites with TIPS deferred to his Liver Transplant team and/or Hepatology, he has an appointment on 4/21/2023 with Hepatology, Dr. Simms.    Continue Tacrolimus 1 mg BID, rifaximin 550 mg BID.    Continue lactulose as ordered, titrate as needed to have 2-3 bms.    GI consult as needed in hospital for new acute issues, otherwise as outpatient.    Encourgae high protein diet.    CT abdomen on March 9, 2023 showed small to moderate ascites.    Goals of care   As per prior rounding provider, \"on 1/25 nursing had mentioned that patient had refused to undergo dialysis and want to stop care, palliative care was consulted.  Patient and his spouse denied wanting to stop care and wanted ongoing restorative care including full code\"    Monitor, Full Code status.    Needs placement to TCU.     Diarrhea, improved, on lactulose for chronic liver disease    C difficile negative on 1/31. Secondary to lactulose. Discontinued rectal tube (2/12) as stools more formed.    Continue lactulose with goal of 2 to 3 soft bowel movement in 24 hours.     Paroxysmal atrial fibrillation  Chronic anticoagulation, apixaban  History of embolic strokes  Remains in sinus rhythm, on anticoagulation with apixaban indefinitely for stroke prophylaxis.      Monitor rhythm.    Continue apixaban anticoagulation.    Monitor hemoglobin, see below.    Apixaban held for fistula placement.      Acute anemia  Patient has required intermittent transfusions for on-going anemia.  Anemia most likely secondary to critical illness/chronic disease, chronic renal " disease.  Baseline hemoglobin around 7-8.  Likely Epo resistance.    Monitor intermittently.    Hypercalcemia  Josue hypercalcemia of Immobility    Nephrology following.    Hold VIT D.    Continue to hold Phoslo.    Monitor labs.    Encourage ambulation.    History PEA arrest   PEA arrest prior to his previous admission and 1 episode of PEA associated with desaturation on 12/20.  No structural cardiac disease.     Stable, continue cardiac Monitoring.     Chronic Hypotension  In the past has developed baseline hypotension with cirrhosis and prolonged critical illness.  On hemodialysis.  Receiving midodrine and albumin during dialysis.    Continue midodrine, as per nephrology.      History of seizure   After hypoxic event, in the context of baseline multifactorial encephalopathy secondary to his ongoing critical illness and prior cardiac arrests and prior strokes and cirrhosis with hepatic encephalopathy. MRI of head of 12/20/22 reveals no PRES or leptomeningeal enhancement but scattered.  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   No recent seizure activity.    Continue levetiracetam, lacosamide.    Seizure precautions.    Outpatient follow-up with neurology, consult inpatient if needed.     ESRD  Anemia due to renal disease  Hypotension during dialysis    As per nephrology.    Nephrology reviewing vein mapping to assess options for internal access placement for dialysis, see note 3/16.    Underwent Fistula placement on  3/21/22.    Vascular surgery following-continue to use for tunnel catheter.  Outpatient follow-up with vascular surgery and for will need a follow-up ultrasound of the fistula in 6 weeks to assess patency.    Patient phosphorous level 6 today and will discuss with nephrology.    Started sevalmer 800mg TID with meals on 3/27/23.     Diabetes mellitus type 2  *Hemoglobin A1c on November 2022 was 4.7  *By report, on Lantus insulin in the  past.  Blood sugar checks and sliding scale insulin previously discontinued as has been stable without insulin needs.    Monitor with periodic blood sugar checks as needed.    Hypomagnesemia    Resolved with replacement.     Severe malnutrition, protein and calorie type  Moderate dysphagia  G-tube feedings    Continue with tube feeds via PEG tube.    IR replaced the PEG tube on 2/8/2023, monitor.    Nutritionist consulted and following for tube feeds.    SLP following as well. Oral diet as per speech and language therapy (SLP).    2/20 Nutrition following for tube feeds.    Now undergoing PRN tube feeds.    Encourage high-protein diet.    Nutrition notes reviewed patient eating 50 to 100% and sometimes more often 100% of adequate meals.  Continue current interventions.  As needed boluses of patient consumes less than 50% of the meal.    Family encouraging patient to eat high protein diet including protein bars.     Anxiety  Fluoxetine was discontinued as per psychiatry recommendation on 2/2 (see consult note for details).    Stable, monitor; comfort and reassurance offered during visit.    Psychiatry follow-up.    Restless leg  Syndrome    Patient started on ropiranole by neurology.       Diet: Room Service  Snacks/Supplements Adult: Other; Gelatein+ with brkfst and lunch, and magic cup with dinner (RD); With Meals  Adult Formula Bolus Feeding: Novasource Renal; Route: Gastrostomy; 3 times daily; Volume per Bolus: 240; mL(s); Continue to encourage after meal bolus at 80 mL/hr x 3 hrs, If patient consumes <50% of that meal  Combination Diet Regular Diet; Mildly Thick (level 2) (OK for fresh fruit (e.g., pineapple) per SLP)    DVT Prophylaxis: DOAC  Nixon Catheter: Not present  Lines: PRESENT      CVC Double Lumen Right External jugular Tunneled-Site Assessment: WDL  Hemodialysis Vascular Access Arteriovenous fistula Left Forearm-Site Assessment: WDL      Cardiac Monitoring: None  Code Status: Full Code       Clinically Significant Risk Factors           # Hypercalcemia: Highest Ca = 11.4 mg/dL in last 2 days, will monitor as appropriate    # Hypoalbuminemia: Lowest albumin = 1.9 g/dL at 1/23/2023  6:38 AM, will monitor as appropriate            # Severe Malnutrition: based on nutrition assessment        Disposition Plan     Expected Discharge Date: 04/05/2023, 12:00 PM  Discharge Delays: Placement - LTC  Destination: inpatient rehabilitation facility  Discharge Comments: Dialysis pt MWF. Will need placement TCU/LTC. [facility] willing to accept once no sitter, SW to follow up when sitter free          Elvis Mills MD  Hospitalist Service  [facility]  Securely message with [employer] (more info)  Text page via [employer] Paging/Directory   ______________________________________________________________________    Interval History   Blanco Osborne was seen this morning. He was tired, but otherwise felt OK. No other complaints/concerns.    Physical Exam   Vital Signs: Temp: 97.7  F (36.5  C) Temp src: Axillary BP: 110/66 Pulse: 94   Resp: 20 SpO2: 98 % O2 Device: None (Room air)    Weight: 170 lbs 10.18 oz    Constitutional: awake, drowsy, cooperative, no apparent distress, laying in the hospital bed  Respiratory: no increased work of breathing, clear to auscultation bilaterally, no crackles or wheezing  Cardiovascular: regular rate and rhythm, normal S1 and S2, no S3 or S4, and no murmur noted    Medical Decision Making       25 MINUTES SPENT BY ME on the date of service doing chart review, history, exam, documentation & further activities per the note.      Data     I have personally reviewed the following data over the past 24 hrs:    N/A  \   N/A   / N/A     138 100 66.9 (H) /  121 (H)   4.7 25 4.54 (H) \       ALT: N/A AST: N/A AP: N/A TBILI: N/A   ALB: 3.0 (L) TOT PROTEIN: N/A LIPASE: N/A

## 2023-03-28 NOTE — PLAN OF CARE
Goal Outcome Evaluation:      Goal Outcome Evaluation:     DATE & TIME: 3/28/23 [id number]  Cognitive Concerns/ Orientation: Disoriented to time and situation;   BEHAVIOR & AGGRESSION TOOL COLOR: Green         ABNL VS/O2: VSS, RA  MOBILITY: Assist-2 gait belt/walker. 10 lb weight bear limit on L arm d/t new fistula. Up in chair; ambulated [place] with nursing x 2 and again with PT  PAIN MANAGMENT: Chronic low back pain; refused both lidocaine and Icyhot patches.  Tylenol given this morning  DIET: Regular diet, mildly thickened liquids (level 2); takes pills without difficulty; needs encouragement to eat at times  BOWEL/BLADDER: On Hemodialysis.  BM x 1 this shift  ABNL LAB/BG: Ionized Ca 5.8, albumin 3.0  DRAIN/DEVICES: R chest CVC, double lumen for hemodialysis; PEG tube clamped; L fistula placed 3/21/23 with strong bruit/thrill present   SKIN: Mild jaundice/pale; Scattered bruising; L forearm skin tear covered; L fistula site with sutures intact   PROCEDURES: Dialysis MWF -   D/C DATE: Pending placement to TCU  OTHER IMPORTANT INFO:

## 2023-03-29 LAB
ALBUMIN SERPL BCG-MCNC: 3 G/DL (ref 3.5–5.2)
ANION GAP SERPL CALCULATED.3IONS-SCNC: 13 MMOL/L (ref 7–15)
BUN SERPL-MCNC: 83.6 MG/DL (ref 6–20)
CALCIUM SERPL-MCNC: 11.6 MG/DL (ref 8.6–10)
CHLORIDE SERPL-SCNC: 94 MMOL/L (ref 98–107)
CREAT SERPL-MCNC: 5.2 MG/DL (ref 0.67–1.17)
DEPRECATED HCO3 PLAS-SCNC: 25 MMOL/L (ref 22–29)
GFR SERPL CREATININE-BSD FRML MDRD: 12 ML/MIN/1.73M2
GLUCOSE SERPL-MCNC: 93 MG/DL (ref 70–99)
HBV SURFACE AB SERPL IA-ACNC: 0.96 M[IU]/ML
HBV SURFACE AB SERPL IA-ACNC: NONREACTIVE M[IU]/ML
HBV SURFACE AG SERPL QL IA: NONREACTIVE
LACTATE SERPL-SCNC: 0.9 MMOL/L (ref 0.7–2)
MAGNESIUM SERPL-MCNC: 2.1 MG/DL (ref 1.7–2.3)
POTASSIUM SERPL-SCNC: 5.4 MMOL/L (ref 3.4–5.3)
SODIUM SERPL-SCNC: 132 MMOL/L (ref 136–145)

## 2023-03-29 PROCEDURE — 120N000001 HC R&B MED SURG/OB

## 2023-03-29 PROCEDURE — 90937 HEMODIALYSIS REPEATED EVAL: CPT

## 2023-03-29 PROCEDURE — 90935 HEMODIALYSIS ONE EVALUATION: CPT | Performed by: STUDENT IN AN ORGANIZED HEALTH CARE EDUCATION/TRAINING PROGRAM

## 2023-03-29 PROCEDURE — 250N000011 HC RX IP 250 OP 636: Performed by: STUDENT IN AN ORGANIZED HEALTH CARE EDUCATION/TRAINING PROGRAM

## 2023-03-29 PROCEDURE — 82040 ASSAY OF SERUM ALBUMIN: CPT | Performed by: STUDENT IN AN ORGANIZED HEALTH CARE EDUCATION/TRAINING PROGRAM

## 2023-03-29 PROCEDURE — 83735 ASSAY OF MAGNESIUM: CPT | Performed by: HOSPITALIST

## 2023-03-29 PROCEDURE — 250N000013 HC RX MED GY IP 250 OP 250 PS 637: Performed by: STUDENT IN AN ORGANIZED HEALTH CARE EDUCATION/TRAINING PROGRAM

## 2023-03-29 PROCEDURE — 87340 HEPATITIS B SURFACE AG IA: CPT | Performed by: INTERNAL MEDICINE

## 2023-03-29 PROCEDURE — 36415 COLL VENOUS BLD VENIPUNCTURE: CPT | Performed by: HOSPITALIST

## 2023-03-29 PROCEDURE — 634N000001 HC RX 634: Performed by: STUDENT IN AN ORGANIZED HEALTH CARE EDUCATION/TRAINING PROGRAM

## 2023-03-29 PROCEDURE — 250N000013 HC RX MED GY IP 250 OP 250 PS 637: Performed by: PSYCHIATRY & NEUROLOGY

## 2023-03-29 PROCEDURE — 99231 SBSQ HOSP IP/OBS SF/LOW 25: CPT | Performed by: HOSPITALIST

## 2023-03-29 PROCEDURE — 36415 COLL VENOUS BLD VENIPUNCTURE: CPT | Performed by: INTERNAL MEDICINE

## 2023-03-29 PROCEDURE — 258N000003 HC RX IP 258 OP 636: Performed by: STUDENT IN AN ORGANIZED HEALTH CARE EDUCATION/TRAINING PROGRAM

## 2023-03-29 PROCEDURE — 86706 HEP B SURFACE ANTIBODY: CPT | Performed by: INTERNAL MEDICINE

## 2023-03-29 PROCEDURE — 83605 ASSAY OF LACTIC ACID: CPT | Performed by: HOSPITALIST

## 2023-03-29 PROCEDURE — 82310 ASSAY OF CALCIUM: CPT | Performed by: STUDENT IN AN ORGANIZED HEALTH CARE EDUCATION/TRAINING PROGRAM

## 2023-03-29 PROCEDURE — 250N000012 HC RX MED GY IP 250 OP 636 PS 637: Performed by: STUDENT IN AN ORGANIZED HEALTH CARE EDUCATION/TRAINING PROGRAM

## 2023-03-29 RX ORDER — HEPARIN SODIUM 1000 [USP'U]/ML
500 INJECTION, SOLUTION INTRAVENOUS; SUBCUTANEOUS CONTINUOUS
Status: DISCONTINUED | OUTPATIENT
Start: 2023-03-29 | End: 2023-03-29

## 2023-03-29 RX ADMIN — EPOETIN ALFA-EPBX 10000 UNITS: 10000 INJECTION, SOLUTION INTRAVENOUS; SUBCUTANEOUS at 09:42

## 2023-03-29 RX ADMIN — WHITE PETROLATUM: 1.75 OINTMENT TOPICAL at 11:20

## 2023-03-29 RX ADMIN — HEPARIN SODIUM 500 UNITS: 1000 INJECTION INTRAVENOUS; SUBCUTANEOUS at 09:48

## 2023-03-29 RX ADMIN — Medication 1 CAPSULE: at 11:08

## 2023-03-29 RX ADMIN — LACTULOSE 10 G: 10 SOLUTION ORAL at 11:21

## 2023-03-29 RX ADMIN — OLANZAPINE 5 MG: 5 TABLET, ORALLY DISINTEGRATING ORAL at 21:21

## 2023-03-29 RX ADMIN — RAMELTEON 8 MG: 8 TABLET, FILM COATED ORAL at 21:21

## 2023-03-29 RX ADMIN — LACOSAMIDE 100 MG: 100 TABLET, FILM COATED ORAL at 23:26

## 2023-03-29 RX ADMIN — SODIUM CHLORIDE 250 ML: 9 INJECTION, SOLUTION INTRAVENOUS at 09:39

## 2023-03-29 RX ADMIN — SEVELAMER CARBONATE 800 MG: 800 TABLET, FILM COATED ORAL at 12:11

## 2023-03-29 RX ADMIN — TACROLIMUS 1 MG: 1 CAPSULE ORAL at 21:21

## 2023-03-29 RX ADMIN — HEPARIN SODIUM 500 UNITS/HR: 1000 INJECTION INTRAVENOUS; SUBCUTANEOUS at 09:48

## 2023-03-29 RX ADMIN — TACROLIMUS 1 MG: 1 CAPSULE ORAL at 07:29

## 2023-03-29 RX ADMIN — HEPARIN SODIUM 1900 UNITS: 1000 INJECTION INTRAVENOUS; SUBCUTANEOUS at 09:50

## 2023-03-29 RX ADMIN — ACETAMINOPHEN 650 MG: 325 TABLET, FILM COATED ORAL at 11:11

## 2023-03-29 RX ADMIN — SODIUM CHLORIDE 300 ML: 9 INJECTION, SOLUTION INTRAVENOUS at 09:40

## 2023-03-29 RX ADMIN — MIDODRINE HYDROCHLORIDE 10 MG: 5 TABLET ORAL at 11:14

## 2023-03-29 RX ADMIN — GUAIFENESIN 600 MG: 600 TABLET, EXTENDED RELEASE ORAL at 07:29

## 2023-03-29 RX ADMIN — LACOSAMIDE 100 MG: 100 TABLET, FILM COATED ORAL at 11:14

## 2023-03-29 RX ADMIN — LEVETIRACETAM 1000 MG: 500 TABLET, FILM COATED ORAL at 21:21

## 2023-03-29 RX ADMIN — GUAIFENESIN 600 MG: 600 TABLET, EXTENDED RELEASE ORAL at 21:21

## 2023-03-29 RX ADMIN — HEPARIN SODIUM 1900 UNITS: 1000 INJECTION INTRAVENOUS; SUBCUTANEOUS at 09:49

## 2023-03-29 RX ADMIN — ACETAMINOPHEN 650 MG: 325 TABLET, FILM COATED ORAL at 18:43

## 2023-03-29 RX ADMIN — MIDODRINE HYDROCHLORIDE 10 MG: 5 TABLET ORAL at 18:03

## 2023-03-29 RX ADMIN — MELATONIN 5 MG TABLET 10 MG: at 23:26

## 2023-03-29 RX ADMIN — OLANZAPINE 5 MG: 5 TABLET, ORALLY DISINTEGRATING ORAL at 07:30

## 2023-03-29 RX ADMIN — APIXABAN 5 MG: 5 TABLET, FILM COATED ORAL at 07:30

## 2023-03-29 RX ADMIN — RIFAXIMIN 550 MG: 550 TABLET ORAL at 21:21

## 2023-03-29 RX ADMIN — ROPINIROLE HYDROCHLORIDE 0.5 MG: 0.25 TABLET, FILM COATED ORAL at 21:21

## 2023-03-29 RX ADMIN — APIXABAN 5 MG: 5 TABLET, FILM COATED ORAL at 21:21

## 2023-03-29 RX ADMIN — PANTOPRAZOLE SODIUM 40 MG: 40 TABLET, DELAYED RELEASE ORAL at 07:29

## 2023-03-29 RX ADMIN — RIFAXIMIN 550 MG: 550 TABLET ORAL at 07:29

## 2023-03-29 RX ADMIN — SEVELAMER CARBONATE 800 MG: 800 TABLET, FILM COATED ORAL at 18:03

## 2023-03-29 RX ADMIN — FOLIC ACID 1 MG: 1 TABLET ORAL at 11:08

## 2023-03-29 RX ADMIN — MIDODRINE HYDROCHLORIDE 10 MG: 5 TABLET ORAL at 07:30

## 2023-03-29 ASSESSMENT — ACTIVITIES OF DAILY LIVING (ADL)
ADLS_ACUITY_SCORE: 55
ADLS_ACUITY_SCORE: 55
ADLS_ACUITY_SCORE: 54
ADLS_ACUITY_SCORE: 55
ADLS_ACUITY_SCORE: 55
ADLS_ACUITY_SCORE: 54
ADLS_ACUITY_SCORE: 54
ADLS_ACUITY_SCORE: 55
ADLS_ACUITY_SCORE: 55
ADLS_ACUITY_SCORE: 54

## 2023-03-29 NOTE — PROGRESS NOTES
Patient slid off the bed on to the floor while RUDY was assisting patient to bedside commode. Patient in sitting position on the floor with back against the bed.  Gait belt and walker in use.  Patient denies hitting head, no noted injuries.  Charge RN, RUDY and this writer assisted patient back to bed.  Vitals stable.

## 2023-03-29 NOTE — PROGRESS NOTES
[facility]    Medicine Progress Note - Hospitalist Service    Date of Admission:  1/21/2023    Assessment & Plan   Blanco Osborne is a 57 year-old male admitted on 1/21/2023 as a transfer from [facility] for evaluation of loculated pleural effusion. He has extensive PMH including h/o liver transplant 2016 due to alcohol abuse, pAF on chronic anticoagulation, history of diabetes mellitus type 2, CVA, hypertension, CHRISITAN on BiPAP.  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 He was recovering in [facility] and was transferred to [facility] for consideration of chest tube placement and possible intrapleural lysis for worsening effusion.     Acute metabolic encephalopathy with delirium, multifactorial, slowly improving?  Hepatic encephalopathy  Chronic liver disease, history of liver transplant 2016  End-stage renal disease (ESRD), on hemodialysis (HD)  History of seizure, on Keppra and Vimpat  Waxing and waning mentation, coinciding with lactulose being held at [facility]  Earlier in hospital stay, remained confused and had pulled out tracheostomy tube, PICC line and pulled his dialysis catheter.  Lines replaced.  Psychiatry consulted, recent follow-up 2/21, 2/28, see notes.    Mental status continues to fluctuate with periods of alertness and increased sleepiness, overall improving.    Complicated, prolonged hospital course with mental status concerns multifactorial related to hepatic encephalopathy, end-stage renal disease on dialysis, past PEA arrest, seizure disorder, medication, and chronic liver disease with prior liver transplant.  As mental status fluctuates will continue to require one-to-one sitter as needed for added safety and supervision.  Reassess daily.    Continue to reorient and  redirect as able.  Avoid restraints if possible with goals of safety and close supervision (given multiple lines and tubes - dialysis catheter, PEG tube, IV, etc).    Maintain normal day/night cycles and sleep-wake cycles.  Minimize sedating medications as able.  Remains weak and deconditioned with complex, prolonged hospital course and limited mobility.  Encouragement and support offered daily to patient.    Continue Lactulose to 10 mg BID, monitor for symptoms; goal to have 2-3 bowel movements per day.    Ongoing hemodialysis, as per nephrology, 3X/week dialysis schedule.    Nephrology consult follow-up appreciated, dialysis patient.    Continue ramelteon 8 mg at bedtime; monitor for side effects including AM sedation, reassess daily.    Continue olanzapine (Zyprexa) 5 mg bid and as needed; monitor for side effects including sedation.    Seizure disorder stable, continue Keppra and Vimpat; monitor, no recurrent seizures reported of recent.    Reconsult psychiatry as needed.  Neurology consult appreciated.  Patient has been started on ropinirole for restless leg syndrome.     Bilateral pleural effusions   Acute respiratory failure requiring tracheostomy  - resolved    Pneumonia  - resolved   ARDS  - resolved    Right pneumothorax - resolved   *Initial event was parainfluenza and strep pneumo pneumonia back in November 2022. Treated for ARDS. Had failed extubation x2 and tracheostomy performed on previous hospitalization. *Had additional complications of bilateral pneumothoraces as well as hydropneumothorax- pleural fluid grew candida parapsilosis  *Completed 4-week antifungal treatment. While in LTACH he was successfully weaned from the ventilator.  *Recently there were concern for worsening effusion while at LTACH and had thoracentesis 01/13 which returned exudative without growth on cultures. CT chest/abdomen/pelvis on 01/18 demonstrated worsening effusions and concerns for infected parapneumonic effusions with  possible SBP.  Multiple pulmonologist at LTACH reviewed CT scan recommended transfer to [facility] for consideration of chest tube placement and possible intrapleural lysis  *Thoracic surgery (Dr. Jackson) consulted during admission   *CT chest 1/22, small effusions on imaging and so chest tube was not inserted.   ID consulted, see note 2/10.  *Patient pulled out his tracheostomy tube on the night 2/1-2/2 and is tolerating well without increased shortness of breath persistent cough or worsening hypoxia.  * Chest x-ray 2/3 with cardiomegaly, chronic small bilateral pleural effusions, no pulmonary edema; right internal jugular dialysis catheter in place.   3/12 Pulmonary consult, see note, concerns of hypercapnia, atelectasis, with consideration of BiPAP trial; no recommendations for antibiotics with infiltrates felt to be atelectasis.    Stable, continue to monitor respiratory status, recently stable on room air; occasional cough reported.    Encourage incentive spirometry as able, up to chair, deep breathing.    Wound care previously consulted for tracheostomy site care, monitor for signs and symptoms of infection, healing well - resolved.    Encourage ambulation.    Possible splenic infract  Wedge shaped area on CT scan chest and CT abdomen on 3/7/23.    Hematology consulted, suspicion for infarct was low.    TTE no thrombus or vegetations.    No abdominal pain CTM.     S/p liver transplant 2016   Chronic immunosuppression, on tacrolimus  Cirrhosis with ascites  Subacute bacterial peritonitis (SBP), resolved  *Main manifestations of this are hepatic encephalopathy and ascites.  Hepatologist at [facility] felt that most likely reason for the cirrhosis was metabolic syndrome or alcohol intake.  There was no evidence of rejection on biopsy and there was some evidence of regeneration. Paracentesis done on 12/22/2022 showed lactobacillus indicating SBP, treated with Unasyn and Augmentin, finished 2-week course 1/12/2023.  " Requires large-volume paracentesis periodically for recurring ascites.    *Paracentesis 1/13/2023 yielded about 7500 cc. Cultures NGTD. paracentesis on 1/24 with 4.8 L fluid removal  Paracentesis on 3/6/23 No SBP    Continue intermittent paracentesis as needed if develops symptomatic ascites.    Monitor for signs and symptoms of recurrent spontaneous bacterial peritonitis.    Further treatment for recurrent ascites with TIPS deferred to his Liver Transplant team and/or Hepatology, he has an appointment on 4/21/2023 with Hepatology, Dr. Simms.    Continue Tacrolimus 1 mg BID, rifaximin 550 mg BID.    Continue lactulose as ordered, titrate as needed to have 2-3 bms.    GI consult as needed in hospital for new acute issues, otherwise as outpatient.    Encourgae high protein diet.    CT abdomen on March 9, 2023 showed small to moderate ascites.    Goals of care   As per prior rounding provider, \"on 1/25 nursing had mentioned that patient had refused to undergo dialysis and want to stop care, palliative care was consulted.  Patient and his spouse denied wanting to stop care and wanted ongoing restorative care including full code\"    Monitor, Full Code status.    Needs placement to TCU.     Diarrhea, improved, on lactulose for chronic liver disease    C difficile negative on 1/31. Secondary to lactulose. Discontinued rectal tube (2/12) as stools more formed.    Continue lactulose with goal of 2 to 3 soft bowel movement in 24 hours.     Paroxysmal atrial fibrillation  Chronic anticoagulation, apixaban  History of embolic strokes  Remains in sinus rhythm, on anticoagulation with apixaban indefinitely for stroke prophylaxis.      Monitor rhythm.    Continue apixaban anticoagulation.    Monitor hemoglobin, see below.    Apixaban held for fistula placement.      Acute anemia  Patient has required intermittent transfusions for on-going anemia.  Anemia most likely secondary to critical illness/chronic disease, chronic renal " disease.  Baseline hemoglobin around 7-8.  Likely Epo resistance.    Monitor intermittently.    Hypercalcemia  Josue hypercalcemia of Immobility    Nephrology following.    Hold VIT D.    Continue to hold Phoslo.    Monitor labs.    Encourage ambulation.    Nephrology considering prolia, however patient's wife would like to hold off for now.    History PEA arrest   PEA arrest prior to his previous admission and 1 episode of PEA associated with desaturation on 12/20.  No structural cardiac disease.     Stable, continue cardiac Monitoring.     Chronic Hypotension  In the past has developed baseline hypotension with cirrhosis and prolonged critical illness.  On hemodialysis.  Receiving midodrine and albumin during dialysis.    Continue midodrine, as per nephrology.      History of seizure   After hypoxic event, in the context of baseline multifactorial encephalopathy secondary to his ongoing critical illness and prior cardiac arrests and prior strokes and cirrhosis with hepatic encephalopathy. MRI of head of 12/20/22 reveals no PRES or leptomeningeal enhancement but scattered.  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   No recent seizure activity.    Continue levetiracetam, lacosamide.    Seizure precautions.    Outpatient follow-up with neurology, consult inpatient if needed.     ESRD  Anemia due to renal disease  Hypotension during dialysis  Hyperphosphatemia    As per nephrology.    Nephrology reviewing vein mapping to assess options for internal access placement for dialysis, see note 3/16.    Underwent Fistula placement on  3/21/22.    Vascular surgery following-continue to use for tunnel catheter.  Outpatient follow-up with vascular surgery and for will need a follow-up ultrasound of the fistula in 6 weeks to assess patency.    Started sevalmer 800mg TID with meals on 3/27/23.     Diabetes mellitus type 2  *Hemoglobin A1c on November 2022 was 4.7  *By  report, on Lantus insulin in the past.  Blood sugar checks and sliding scale insulin previously discontinued as has been stable without insulin needs.    Monitor with periodic blood sugar checks as needed.    Hypomagnesemia    Resolved with replacement.     Severe malnutrition, protein and calorie type  Moderate dysphagia  G-tube feedings    Continue with tube feeds via PEG tube.    IR replaced the PEG tube on 2/8/2023, monitor.    Nutritionist consulted and following for tube feeds.    SLP following as well. Oral diet as per speech and language therapy (SLP).    2/20 Nutrition following for tube feeds.    Now undergoing PRN tube feeds.    Encourage high-protein diet.    Nutrition notes reviewed patient eating 50 to 100% and sometimes more often 100% of adequate meals.  Continue current interventions.  As needed boluses of patient consumes less than 50% of the meal.    Family encouraging patient to eat high protein diet including protein bars.     Anxiety  Fluoxetine was discontinued as per psychiatry recommendation on 2/2 (see consult note for details).    Stable, monitor; comfort and reassurance offered during visit.    Psychiatry follow-up.    Restless leg  Syndrome    Patient started on ropiranole by neurology.       Diet: Room Service  Snacks/Supplements Adult: Other; Gelatein+ with brkfst and lunch, and magic cup with dinner (RD); With Meals  Adult Formula Bolus Feeding: Novasource Renal; Route: Gastrostomy; 3 times daily; Volume per Bolus: 240; mL(s); Continue to encourage after meal bolus at 80 mL/hr x 3 hrs, If patient consumes <50% of that meal  Combination Diet Regular Diet; Mildly Thick (level 2) (OK for fresh fruit (e.g., pineapple) per SLP)    DVT Prophylaxis: DOAC  Nixon Catheter: Not present  Lines: PRESENT      CVC Double Lumen Right External jugular Tunneled-Site Assessment: WDL  Hemodialysis Vascular Access Arteriovenous fistula Left Forearm-Site Assessment: WDL      Cardiac Monitoring: None  Code  Status: Full Code      Clinically Significant Risk Factors        # Hyperkalemia: Highest K = 5.4 mmol/L in last 2 days, will monitor as appropriate    # Hypercalcemia: Highest Ca = 11.6 mg/dL in last 2 days, will monitor as appropriate    # Hypoalbuminemia: Lowest albumin = 1.9 g/dL at 1/23/2023  6:38 AM, will monitor as appropriate            # Severe Malnutrition: based on nutrition assessment        Disposition Plan      Expected Discharge Date: 04/05/2023, 12:00 PM  Discharge Delays: Placement - LTC  Destination: inpatient rehabilitation facility  Discharge Comments: Dialysis pt MWF. Will need placement TCU/LTC. [facility] willing to accept once no sitter, SW to follow up when sitter free          Elvis Mills MD  Hospitalist Service  [facility]  Securely message with [employer] (more info)  Text page via [employer] Paging/Directory   ______________________________________________________________________    Interval History   Blanco Osborne was seen today. I saw him up with therapy walking in the hallway. When I went to his room later he was sleeping in the hospital bed and I did not wake him up. Spouse was in the room with him, no new concerns.    Physical Exam   Vital Signs: Temp: 97.7  F (36.5  C) Temp src: Oral BP: 95/53 Pulse: 87   Resp: 16 SpO2: 97 % O2 Device: None (Room air)    Weight: 167 lbs 12.32 oz    Constitutional: sleeping in the hospital bed, no apparent distress    Medical Decision Making       25 MINUTES SPENT BY ME on the date of service doing chart review, history, exam, documentation & further activities per the note.      Data     I have personally reviewed the following data over the past 24 hrs:    N/A  \   N/A   / N/A     132 (L) 94 (L) 83.6 (H) /  93   5.4 (H) 25 5.20 (H) \       ALT: N/A AST: N/A AP: N/A TBILI: N/A   ALB: 3.0 (L) TOT PROTEIN: N/A LIPASE: N/A       Procal: N/A CRP: N/A Lactic Acid: 0.9

## 2023-03-29 NOTE — PROGRESS NOTES
Renal Medicine Progress Note            Assessment/Plan:     Assessment:  1) ESRD on MWF chronic dialysis via right tunneled CVC    Blood pressure, hemodynamics/BP's appear good.    Seen by [facility] surgery .  S/p L arm AVF 3/21/23. RIJ catheter, MWF schedule, 3h, heparin with run.      2) Hypercalcemia, secondary to immobalization. Noted high bone turnover state, suppressed PTH.  Plan for anti-resorptive (denosumab or zoledronic acid) if gets more severe (>12.0 corrected calcium)     3) Anemia:    Somewhat epo resistant. Getting retacrit 10,000 units most runs, now escalated to 20K . Adequate iron stores with ferritin 252 and sats 26%.        Plan/Recs:  1) HD today 2L UF. Next HD Fri  2) EPO 20K with HD  3) s/p AVF  4) corrected calcium 12.4 today pre-dialysis. Discussed with wife at bedside, she would like to hold off on prolia unless calcium doesn't improve. Albumin levels have improved dramatically resulting in some improvement in calcium. Will continue to monitor this week, if doesn't improve, will discuss again as hypercalcemia might be contributing to his delirium      Dwayne Laboy MD   [facility] - Nephrology   [phone]          Interval History:       - no acute events overnight   - seen during dialysis, tolerating it well   - denies any complaints besides generalized pain   - discussed prolia with wife at bedside later in the day. Discussed that it may help his delirium to improve hypercalcemia. She worries about this poor dentition and possible side effects including bone pain. Ok to continue to monitor for now. If calcium remains >12 next few days then will discuss again          Medications and Allergies:       - MEDICATION INSTRUCTIONS for Dialysis Patients -   Does not apply See Admin Instructions     apixaban ANTICOAGULANT  5 mg Oral BID     folic acid  1 mg Oral or Feeding Tube Daily     guaiFENesin  600 mg Oral BID     lacosamide  100 mg Oral Q12H     lactulose  10 g Oral BID      levETIRAcetam  1,000 mg Oral Q24H     lidocaine   Transdermal Q8H BIJU     menthol   Transdermal Q8H     midodrine  10 mg Oral or Feeding Tube TID w/meals     mineral oil-hydrophilic petrolatum   Topical Daily     multivitamin RENAL  1 capsule Oral Daily     OLANZapine zydis  5 mg Oral BID     pantoprazole  40 mg Oral QAM AC     ramelteon  8 mg Oral QPM     rifaximin  550 mg Oral or Feeding Tube BID     rOPINIRole  0.5 mg Oral At Bedtime     sevelamer carbonate  800 mg Oral TID w/meals     sodium chloride (PF)  3 mL Intracatheter Q8H     tacrolimus  1 mg Oral Q12H      No Known Allergies         Physical Exam:   Vitals were reviewed  BP 95/53 (BP Location: Right arm, Patient Position: Supine)   Pulse 87   Temp 97.7  F (36.5  C) (Oral)   Resp 16   Wt 76.1 kg (167 lb 12.3 oz)   SpO2 97%   BMI 22.75 kg/m      Wt Readings from Last 3 Encounters:   03/29/23 76.1 kg (167 lb 12.3 oz)   01/21/23 82.4 kg (181 lb 11.2 oz)   12/28/22 96 kg (211 lb 10.3 oz)     Gen: awake, alert, NAD   HEENT: MMM   CV: RRR   Resp: clear   Ext: warm, well perfused, no periphearl edema   Neuro: alert, oriented, normal speech, slightly confused   Lines: RIJ TDC clean and intact          Data:     BMP  Recent Labs   Lab 03/29/23  0800 03/28/23  0958 03/27/23  0839 03/26/23  0854   * 138 135* 137   POTASSIUM 5.4* 4.7 5.1 4.7   CHLORIDE 94* 100 98 99   KELLY 11.6* 11.1* 11.4* 11.8*   CO2 25 25 25 27   BUN 83.6* 66.9* 88.9* 79.2*   CR 5.20* 4.54* 5.59* 5.09*   GLC 93 121* 99 92     CBC  No lab results found in last 7 days.  Lab Results   Component Value Date    AST 20 03/14/2023    ALT 8 (L) 03/14/2023    ALKPHOS 177 (H) 03/14/2023    BILITOTAL 0.4 03/14/2023    CHANDLER 69 (H) 03/25/2023     Lab Results   Component Value Date    INR 1.36 (H) 12/21/2022       Attestation:  I have reviewed today's vital signs, notes, medications, labs and imaging.    Dwayne Laboy MD  [facility] - Nephrology

## 2023-03-29 NOTE — PLAN OF CARE
Goal Outcome Evaluation:      Cognitive Concerns/ Orientation: Disoriented to time, place and situation, slow to respond at times.  BEHAVIOR & AGGRESSION TOOL COLOR: Green         ABNL VS/O2: VSS on RA  MOBILITY: Assist-2 gait belt/walker. 10 lb weight bear limit on L arm d/t new fistula. Turn/repos in bed  PAIN MANAGMENT: Chronic low back pain; refused both lidocaine and Icyhot patches.  Tylenol PRN  DIET: Regular diet, mildly thickened liquids (level 2); takes pills without difficulty  BOWEL/BLADDER: Hemodialysis. No BM during the night   ABNL LAB/BG: Ionized Ca 5.8, albumin 3.0  DRAIN/DEVICES: R chest CVC, double lumen for hemodialysis; PEG tube clamped; L fistula placed 3/21/23 with strong bruit/thrill present.  SKIN: Mild jaundice/pale; Scattered bruising; L forearm skin tear covered; L fistula site with sutures intact   PROCEDURES: Dialysis MWF. Next 3/29.  D/C DATE: Pending placement to TCU.  OTHER IMPORTANT INFO: Resting between cares.  Patient slid off bed at 0145 while NA was attempting to assist patient to bedside commode, no injuries, denies hitting head.

## 2023-03-29 NOTE — PROGRESS NOTES
Potassium   Date Value Ref Range Status   03/29/2023 5.4 (H) 3.4 - 5.3 mmol/L Final   12/29/2022 3.4 3.4 - 5.3 mmol/L Final   04/20/2020 3.9 3.4 - 5.3 mmol/L Final     Potassium POCT   Date Value Ref Range Status   01/19/2023 3.6 3.5 - 5.0 mmol/L Final     Hemoglobin   Date Value Ref Range Status   03/21/2023 8.4 (L) 13.3 - 17.7 g/dL Final   04/20/2020 14.3 13.3 - 17.7 g/dL Final     Creatinine   Date Value Ref Range Status   03/29/2023 5.20 (H) 0.67 - 1.17 mg/dL Final   04/20/2020 1.06 0.66 - 1.25 mg/dL Final     Urea Nitrogen   Date Value Ref Range Status   03/29/2023 83.6 (H) 6.0 - 20.0 mg/dL Final   12/29/2022 46 (H) 7 - 30 mg/dL Final   04/20/2020 23 7 - 30 mg/dL Final     Sodium   Date Value Ref Range Status   03/29/2023 132 (L) 136 - 145 mmol/L Final   04/20/2020 139 133 - 144 mmol/L Final     INR   Date Value Ref Range Status   12/21/2022 1.36 (H) 0.85 - 1.15 Final   10/07/2019 1.20 (H) 0.86 - 1.14 Final       DIALYSIS PROCEDURE NOTE  Hepatitis status of previous patient on machine log was checked and verified ok to use with this patients hepatitis status.  Patient dialyzed for 3 hrs. on a K2 bath with a net fluid removal of  2L.  A BFR of 350 ml/min was obtained via a RIJ catheter.   The treatment plan was discussed with  during the treatment.    Total heparin received during the treatment: 1500 units.  Line flushed, clamped and capped with heparin 1:1000 1.9 mL (1900 units) per lumen    Meds  given: EPO 20,000units IV   Complications: NONE      Person educated: pt. Knowledge base substantial. Barriers to learning: confusion. Educated on access care via verbal mode. Patient verbalized understanding. Pt prefers verbal education style.     ICEBOAT? Timeout performed pre-treatment  I: Patient was identified using 2 identifiers  C:  Consent Signed Yes  E: Equipment preventative maintenance is current and dialysis delivery system OK to use  B: Hepatitis B Surface Antigen: neg; Draw Date: 3/1/2023       Hepatitis B Surface Antibody: succept; Draw Date: 3/9/2023  O: Dialysis orders present and complete prior to treatment  A: Vascular access verified and assessed prior to treatment  T: Treatment was performed at a clinically appropriate time  ?: Patient was allowed to ask questions and address concerns prior to treatment  See Adult Hemodialysis flowsheet in [facility] for further details and post assessment.  Machine water alarm in place and functioning. Transducer pods intact and checked every 15min.   Pt returned via bed.  Chlorine/Chloramine water system checked every 4 hours.  Outpatient Dialysis at [facility]    Patient repositioned every 2 hours during the treatment.  Post treatment report given to AUSTIN Gaffney RN regarding 2L of fluid removed, last BP of 99/45, and patient pain rating of 0/10.

## 2023-03-30 ENCOUNTER — APPOINTMENT (OUTPATIENT)
Dept: SPEECH THERAPY | Facility: CLINIC | Age: 58
DRG: 981 | End: 2023-03-30
Attending: STUDENT IN AN ORGANIZED HEALTH CARE EDUCATION/TRAINING PROGRAM
Payer: COMMERCIAL

## 2023-03-30 ENCOUNTER — APPOINTMENT (OUTPATIENT)
Dept: PHYSICAL THERAPY | Facility: CLINIC | Age: 58
DRG: 981 | End: 2023-03-30
Attending: STUDENT IN AN ORGANIZED HEALTH CARE EDUCATION/TRAINING PROGRAM
Payer: COMMERCIAL

## 2023-03-30 LAB
ALBUMIN SERPL BCG-MCNC: 3.2 G/DL (ref 3.5–5.2)
ANION GAP SERPL CALCULATED.3IONS-SCNC: 12 MMOL/L (ref 7–15)
BUN SERPL-MCNC: 55.2 MG/DL (ref 6–20)
CALCIUM SERPL-MCNC: 11.1 MG/DL (ref 8.6–10)
CHLORIDE SERPL-SCNC: 99 MMOL/L (ref 98–107)
CREAT SERPL-MCNC: 4.36 MG/DL (ref 0.67–1.17)
DEPRECATED HCO3 PLAS-SCNC: 26 MMOL/L (ref 22–29)
ERYTHROCYTE [DISTWIDTH] IN BLOOD BY AUTOMATED COUNT: 16.5 % (ref 10–15)
GFR SERPL CREATININE-BSD FRML MDRD: 15 ML/MIN/1.73M2
GLUCOSE SERPL-MCNC: 101 MG/DL (ref 70–99)
HCT VFR BLD AUTO: 28.3 % (ref 40–53)
HGB BLD-MCNC: 8.4 G/DL (ref 13.3–17.7)
MCH RBC QN AUTO: 29.7 PG (ref 26.5–33)
MCHC RBC AUTO-ENTMCNC: 29.7 G/DL (ref 31.5–36.5)
MCV RBC AUTO: 100 FL (ref 78–100)
PLATELET # BLD AUTO: 102 10E3/UL (ref 150–450)
POTASSIUM SERPL-SCNC: 4.5 MMOL/L (ref 3.4–5.3)
RBC # BLD AUTO: 2.83 10E6/UL (ref 4.4–5.9)
SODIUM SERPL-SCNC: 137 MMOL/L (ref 136–145)
WBC # BLD AUTO: 3.4 10E3/UL (ref 4–11)

## 2023-03-30 PROCEDURE — 250N000013 HC RX MED GY IP 250 OP 250 PS 637: Performed by: STUDENT IN AN ORGANIZED HEALTH CARE EDUCATION/TRAINING PROGRAM

## 2023-03-30 PROCEDURE — 82040 ASSAY OF SERUM ALBUMIN: CPT | Performed by: STUDENT IN AN ORGANIZED HEALTH CARE EDUCATION/TRAINING PROGRAM

## 2023-03-30 PROCEDURE — 92526 ORAL FUNCTION THERAPY: CPT | Mod: GN

## 2023-03-30 PROCEDURE — 36415 COLL VENOUS BLD VENIPUNCTURE: CPT | Performed by: STUDENT IN AN ORGANIZED HEALTH CARE EDUCATION/TRAINING PROGRAM

## 2023-03-30 PROCEDURE — 99232 SBSQ HOSP IP/OBS MODERATE 35: CPT | Performed by: STUDENT IN AN ORGANIZED HEALTH CARE EDUCATION/TRAINING PROGRAM

## 2023-03-30 PROCEDURE — 250N000012 HC RX MED GY IP 250 OP 636 PS 637: Performed by: STUDENT IN AN ORGANIZED HEALTH CARE EDUCATION/TRAINING PROGRAM

## 2023-03-30 PROCEDURE — 120N000001 HC R&B MED SURG/OB

## 2023-03-30 PROCEDURE — 97530 THERAPEUTIC ACTIVITIES: CPT | Mod: GP

## 2023-03-30 PROCEDURE — 97116 GAIT TRAINING THERAPY: CPT | Mod: GP

## 2023-03-30 PROCEDURE — 250N000013 HC RX MED GY IP 250 OP 250 PS 637: Performed by: PSYCHIATRY & NEUROLOGY

## 2023-03-30 PROCEDURE — 80048 BASIC METABOLIC PNL TOTAL CA: CPT | Performed by: STUDENT IN AN ORGANIZED HEALTH CARE EDUCATION/TRAINING PROGRAM

## 2023-03-30 PROCEDURE — 99232 SBSQ HOSP IP/OBS MODERATE 35: CPT | Performed by: HOSPITALIST

## 2023-03-30 PROCEDURE — 85027 COMPLETE CBC AUTOMATED: CPT | Performed by: HOSPITALIST

## 2023-03-30 RX ADMIN — MIDODRINE HYDROCHLORIDE 10 MG: 5 TABLET ORAL at 12:35

## 2023-03-30 RX ADMIN — LACTULOSE 10 G: 10 SOLUTION ORAL at 08:59

## 2023-03-30 RX ADMIN — OLANZAPINE 5 MG: 5 TABLET, ORALLY DISINTEGRATING ORAL at 20:00

## 2023-03-30 RX ADMIN — APIXABAN 5 MG: 5 TABLET, FILM COATED ORAL at 08:58

## 2023-03-30 RX ADMIN — GUAIFENESIN 600 MG: 600 TABLET, EXTENDED RELEASE ORAL at 20:00

## 2023-03-30 RX ADMIN — RIFAXIMIN 550 MG: 550 TABLET ORAL at 08:56

## 2023-03-30 RX ADMIN — ACETAMINOPHEN 650 MG: 325 TABLET, FILM COATED ORAL at 09:15

## 2023-03-30 RX ADMIN — LACTULOSE 10 G: 10 SOLUTION ORAL at 20:00

## 2023-03-30 RX ADMIN — SEVELAMER CARBONATE 800 MG: 800 TABLET, FILM COATED ORAL at 17:48

## 2023-03-30 RX ADMIN — TACROLIMUS 1 MG: 1 CAPSULE ORAL at 08:57

## 2023-03-30 RX ADMIN — PANTOPRAZOLE SODIUM 40 MG: 40 TABLET, DELAYED RELEASE ORAL at 08:57

## 2023-03-30 RX ADMIN — SEVELAMER CARBONATE 800 MG: 800 TABLET, FILM COATED ORAL at 08:58

## 2023-03-30 RX ADMIN — SEVELAMER CARBONATE 800 MG: 800 TABLET, FILM COATED ORAL at 12:35

## 2023-03-30 RX ADMIN — ACETAMINOPHEN 650 MG: 325 TABLET, FILM COATED ORAL at 20:00

## 2023-03-30 RX ADMIN — ROPINIROLE HYDROCHLORIDE 0.5 MG: 0.25 TABLET, FILM COATED ORAL at 21:08

## 2023-03-30 RX ADMIN — WHITE PETROLATUM: 1.75 OINTMENT TOPICAL at 09:06

## 2023-03-30 RX ADMIN — LEVETIRACETAM 1000 MG: 500 TABLET, FILM COATED ORAL at 21:08

## 2023-03-30 RX ADMIN — TACROLIMUS 1 MG: 1 CAPSULE ORAL at 20:00

## 2023-03-30 RX ADMIN — FOLIC ACID 1 MG: 1 TABLET ORAL at 08:58

## 2023-03-30 RX ADMIN — Medication 1 CAPSULE: at 08:57

## 2023-03-30 RX ADMIN — GUAIFENESIN 600 MG: 600 TABLET, EXTENDED RELEASE ORAL at 08:57

## 2023-03-30 RX ADMIN — APIXABAN 5 MG: 5 TABLET, FILM COATED ORAL at 20:00

## 2023-03-30 RX ADMIN — OLANZAPINE 5 MG: 5 TABLET, ORALLY DISINTEGRATING ORAL at 09:03

## 2023-03-30 RX ADMIN — RIFAXIMIN 550 MG: 550 TABLET ORAL at 20:00

## 2023-03-30 RX ADMIN — MIDODRINE HYDROCHLORIDE 10 MG: 5 TABLET ORAL at 08:58

## 2023-03-30 RX ADMIN — RAMELTEON 8 MG: 8 TABLET, FILM COATED ORAL at 20:00

## 2023-03-30 RX ADMIN — LACOSAMIDE 100 MG: 100 TABLET, FILM COATED ORAL at 12:35

## 2023-03-30 RX ADMIN — MIDODRINE HYDROCHLORIDE 10 MG: 5 TABLET ORAL at 17:48

## 2023-03-30 ASSESSMENT — ACTIVITIES OF DAILY LIVING (ADL)
ADLS_ACUITY_SCORE: 56
ADLS_ACUITY_SCORE: 54
ADLS_ACUITY_SCORE: 55
ADLS_ACUITY_SCORE: 58
ADLS_ACUITY_SCORE: 56
ADLS_ACUITY_SCORE: 54
ADLS_ACUITY_SCORE: 56
ADLS_ACUITY_SCORE: 54

## 2023-03-30 NOTE — PROGRESS NOTES
[facility]    Medicine Progress Note - Hospitalist Service    Date of Admission:  1/21/2023    Assessment & Plan   Blanco Osborne is a 57 year-old male admitted on 1/21/2023 as a transfer from [facility] for evaluation of loculated pleural effusion. He has extensive PMH including h/o liver transplant 2016 due to alcohol abuse, pAF on chronic anticoagulation, history of diabetes mellitus type 2, CVA, hypertension, CHRISTIAN on BiPAP.  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 He was recovering in [facility] and was transferred to [facility] for consideration of chest tube placement and possible intrapleural lysis for worsening effusion.     Acute metabolic encephalopathy with delirium, multifactorial, slowly improving?  Hepatic encephalopathy  Chronic liver disease, history of liver transplant 2016  End-stage renal disease (ESRD), on hemodialysis (HD)  History of seizure, on Keppra and Vimpat  Waxing and waning mentation, coinciding with lactulose being held at [facility]  Earlier in hospital stay, remained confused and had pulled out tracheostomy tube, PICC line and pulled his dialysis catheter.  Lines replaced.  Psychiatry consulted, recent follow-up 2/21, 2/28, see notes.    Mental status continues to fluctuate with periods of alertness and increased sleepiness, overall improving.    Complicated, prolonged hospital course with mental status concerns multifactorial related to hepatic encephalopathy, end-stage renal disease on dialysis, past PEA arrest, seizure disorder, medication, and chronic liver disease with prior liver transplant.  As mental status fluctuates will continue to require one-to-one sitter as needed for added safety and supervision.  Reassess daily.    Continue to reorient and  redirect as able.  Avoid restraints if possible with goals of safety and close supervision (given multiple lines and tubes - dialysis catheter, PEG tube, IV, etc).    Maintain normal day/night cycles and sleep-wake cycles.  Minimize sedating medications as able.  Remains weak and deconditioned with complex, prolonged hospital course and limited mobility.  Encouragement and support offered daily to patient.    Continue Lactulose to 10 mg BID, monitor for symptoms; goal to have 2-3 bowel movements per day.    Ongoing hemodialysis, as per nephrology, 3X/week dialysis schedule.    Nephrology consult follow-up appreciated, dialysis patient.    Continue ramelteon 8 mg at bedtime; monitor for side effects including AM sedation, reassess daily.    Continue olanzapine (Zyprexa) 5 mg bid and as needed; monitor for side effects including sedation.    Seizure disorder stable, continue Keppra and Vimpat; monitor, no recurrent seizures reported of recent.    Reconsult psychiatry as needed.  Neurology consult appreciated.  Patient has been started on ropinirole for restless leg syndrome.     Bilateral pleural effusions   Acute respiratory failure requiring tracheostomy  - resolved    Pneumonia  - resolved   ARDS  - resolved    Right pneumothorax - resolved   *Initial event was parainfluenza and strep pneumo pneumonia back in November 2022. Treated for ARDS. Had failed extubation x2 and tracheostomy performed on previous hospitalization. *Had additional complications of bilateral pneumothoraces as well as hydropneumothorax- pleural fluid grew candida parapsilosis  *Completed 4-week antifungal treatment. While in [facility]ACH he was successfully weaned from the ventilator.  *Recently there were concern for worsening effusion while at LTACH and had thoracentesis 01/13 which returned exudative without growth on cultures. CT chest/abdomen/pelvis on 01/18 demonstrated worsening effusions and concerns for infected parapneumonic effusions with  possible SBP.  Multiple pulmonologist at [facility]ACH reviewed CT scan recommended transfer to [facility] for consideration of chest tube placement and possible intrapleural lysis  *Thoracic surgery (Dr. Jackson) consulted during admission   *CT chest 1/22, small effusions on imaging and so chest tube was not inserted.   ID consulted, see note 2/10.  *Patient pulled out his tracheostomy tube on the night 2/1-2/2 and is tolerating well without increased shortness of breath persistent cough or worsening hypoxia.  * Chest x-ray 2/3 with cardiomegaly, chronic small bilateral pleural effusions, no pulmonary edema; right internal jugular dialysis catheter in place.   3/12 Pulmonary consult, see note, concerns of hypercapnia, atelectasis, with consideration of BiPAP trial; no recommendations for antibiotics with infiltrates felt to be atelectasis.    Stable, continue to monitor respiratory status, recently stable on room air; occasional cough reported.    Encourage incentive spirometry as able, up to chair, deep breathing.    Wound care previously consulted for tracheostomy site care, monitor for signs and symptoms of infection, healing well - resolved.    Encourage ambulation.    Possible splenic infract  Wedge shaped area on CT scan chest and CT abdomen on 3/7/23.    Hematology consulted, suspicion for infarct was low.    TTE no thrombus or vegetations.    No abdominal pain CTM.     S/p liver transplant 2016   Chronic immunosuppression, on tacrolimus  Cirrhosis with ascites  Subacute bacterial peritonitis (SBP), resolved  *Main manifestations of this are hepatic encephalopathy and ascites.  Hepatologist at [facility] felt that most likely reason for the cirrhosis was metabolic syndrome or alcohol intake.  There was no evidence of rejection on biopsy and there was some evidence of regeneration. Paracentesis done on 12/22/2022 showed lactobacillus indicating SBP, treated with Unasyn and Augmentin, finished 2-week course 1/12/2023.  " Requires large-volume paracentesis periodically for recurring ascites.    *Paracentesis 1/13/2023 yielded about 7500 cc. Cultures NGTD. paracentesis on 1/24 with 4.8 L fluid removal  Paracentesis on 3/6/23 No SBP    Continue intermittent paracentesis as needed if develops symptomatic ascites.    Monitor for signs and symptoms of recurrent spontaneous bacterial peritonitis.    Further treatment for recurrent ascites with TIPS deferred to his Liver Transplant team and/or Hepatology, he has an appointment on 4/21/2023 with Hepatology, Dr. Simms.    Continue Tacrolimus 1 mg BID, rifaximin 550 mg BID.    Continue lactulose as ordered, titrate as needed to have 2-3 bms.    GI consult as needed in hospital for new acute issues, otherwise as outpatient.    Encourgae high protein diet.    CT abdomen on March 9, 2023 showed small to moderate ascites.    Goals of care   As per prior rounding provider, \"on 1/25 nursing had mentioned that patient had refused to undergo dialysis and want to stop care, palliative care was consulted.  Patient and his spouse denied wanting to stop care and wanted ongoing restorative care including full code\"    Monitor, Full Code status.    Needs placement to TCU.     Diarrhea, improved, on lactulose for chronic liver disease    C difficile negative on 1/31. Secondary to lactulose. Discontinued rectal tube (2/12) as stools more formed.    Continue lactulose with goal of 2 to 3 soft bowel movement in 24 hours.     Paroxysmal atrial fibrillation  Chronic anticoagulation, apixaban  History of embolic strokes  Remains in sinus rhythm, on anticoagulation with apixaban indefinitely for stroke prophylaxis.      Monitor rhythm.    Continue apixaban anticoagulation.    Monitor hemoglobin, see below.    Apixaban was held for fistula placement.      Acute anemia  Patient has required intermittent transfusions for on-going anemia.  Anemia most likely secondary to critical illness/chronic disease, chronic renal " disease.  Baseline hemoglobin around 7-8.  Likely Epo resistance.    Hemoglobin stable at 8.4 on 3/30/23.    Monitor intermittently.    Hypercalcemia  Josue hypercalcemia of Immobility    Nephrology following.    Hold VIT D.    Continue to hold Phoslo.    Monitor labs.    Encourage ambulation.    Nephrology considering prolia, however patient's wife would like to hold off for now.    History PEA arrest   PEA arrest prior to his previous admission and 1 episode of PEA associated with desaturation on 12/20.  No structural cardiac disease.     Stable, continue cardiac Monitoring.     Chronic Hypotension  In the past has developed baseline hypotension with cirrhosis and prolonged critical illness.  On hemodialysis.  Receiving midodrine and albumin during dialysis.    Continue midodrine, as per nephrology.      History of seizure   After hypoxic event, in the context of baseline multifactorial encephalopathy secondary to his ongoing critical illness and prior cardiac arrests and prior strokes and cirrhosis with hepatic encephalopathy. MRI of head of 12/20/22 reveals no PRES or leptomeningeal enhancement but scattered.  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   No recent seizure activity.    Continue levetiracetam, lacosamide.    Seizure precautions.    Outpatient follow-up with neurology, consult inpatient if needed.     ESRD  Anemia due to renal disease  Hypotension during dialysis  Hyperphosphatemia    As per nephrology.    Nephrology reviewing vein mapping to assess options for internal access placement for dialysis, see note 3/16.    Underwent Fistula placement on  3/21/22.    Vascular surgery following-continue to use for tunnel catheter.  Outpatient follow-up with vascular surgery and for will need a follow-up ultrasound of the fistula in 6 weeks to assess patency.    Started sevalmer 800mg TID with meals on 3/27/23.     Diabetes mellitus type 2  *Hemoglobin  A1c on November 2022 was 4.7  *By report, on Lantus insulin in the past.  Blood sugar checks and sliding scale insulin previously discontinued as has been stable without insulin needs.    Monitor with periodic blood sugar checks as needed.    Hypomagnesemia    Resolved with replacement.     Severe malnutrition, protein and calorie type  Moderate dysphagia  G-tube feedings    Continue with tube feeds via PEG tube.    IR replaced the PEG tube on 2/8/2023, monitor.    Nutritionist consulted and following for tube feeds.    SLP following as well. Oral diet as per speech and language therapy (SLP). Patient hopeful to advance to thin liquids soon.    2/20 Nutrition following for tube feeds.    Now undergoing PRN tube feeds.    Encourage high-protein diet.    Nutrition notes reviewed, patient eating 0 to 100% of meals.  Continue current interventions.  As needed tube feed boluses available if patient consumes less than 50% of the meal.    Family encouraging patient to eat high protein diet including protein bars.     Anxiety  Fluoxetine was discontinued as per psychiatry recommendation on 2/2 (see consult note for details).    Stable, monitor; comfort and reassurance offered during visit.    Psychiatry follow-up.    Restless leg  Syndrome    Patient started on ropiranole by neurology.       Diet: Room Service  Snacks/Supplements Adult: Other; Gelatein+ with brkfst and lunch, and magic cup with dinner (RD); With Meals  Combination Diet Regular Diet; Mildly Thick (level 2) (OK for fresh fruit (e.g., pineapple) per SLP)  Adult Formula Bolus Feeding: Novasource Renal; Route: Gastrostomy; 3 times daily; Volume per Bolus: 240; mL(s); Back up bolus for when pt consumes <50% of meals: 80 mL/hr x 3 hrs    DVT Prophylaxis: DOAC  Nixon Catheter: Not present  Lines: PRESENT      CVC Double Lumen Right External jugular Tunneled-Site Assessment: WDL  Hemodialysis Vascular Access Arteriovenous fistula Left Forearm-Site Assessment: WDL       Cardiac Monitoring: None  Code Status: Full Code      Clinically Significant Risk Factors        # Hyperkalemia: Highest K = 5.4 mmol/L in last 2 days, will monitor as appropriate    # Hypercalcemia: Highest Ca = 11.6 mg/dL in last 2 days, will monitor as appropriate    # Hypoalbuminemia: Lowest albumin = 1.9 g/dL at 1/23/2023  6:38 AM, will monitor as appropriate   # Thrombocytopenia: Lowest platelets = 102 in last 2 days, will monitor for bleeding          # Severe Malnutrition: based on nutrition assessment        Disposition Plan      Expected Discharge Date: 04/05/2023, 12:00 PM  Discharge Delays: Placement - LTC  Destination: inpatient rehabilitation facility  Discharge Comments: Dialysis pt MWF. Will need placement TCU/LTC. [facility] willing to accept once no sitter, SW to follow up when sitter free          Elvis Mills MD  Hospitalist Service  [facility]  Securely message with [employer] (more info)  Text page via [email or web address] Paging/Directory   ______________________________________________________________________    Interval History   Blanco Osborne was seen this morning. He feels OK. Doesn't like the thickened liquids, discussed rationale for using thickened liquids and encouraged him to do his best with speech therapy in hopes of advancing his diet to thin liquids. No other complaints/concerns. Denies fevers, chest pain, shortness of breath, nausea, abdominal pain.    Physical Exam   Vital Signs: Temp: 97.5  F (36.4  C) Temp src: Axillary BP: 91/53 Pulse: 76   Resp: 16 SpO2: 94 % O2 Device: None (Room air)    Weight: 167 lbs 12.32 oz    Constitutional: awake, alert, cooperative, no apparent distress, sitting up in a chair  Respiratory: no increased work of breathing, clear to auscultation bilaterally, no crackles or wheezing  Cardiovascular: regular rate and rhythm, normal S1 and S2, no murmur noted  GI: normal bowel sounds, soft, non-distended, non-tender  Skin: warm,  dry  Musculoskeletal: no lower extremity pitting edema present  Neurologic: awake, alert, answered questions appropriately, oriented to month, city, current president of the [place]; moves all extremities    Medical Decision Making       35 MINUTES SPENT BY ME on the date of service doing chart review, history, exam, documentation & further activities per the note.      Data     I have personally reviewed the following data over the past 24 hrs:    3.4 (L)  \   8.4 (L)   / 102 (L)     137 99 55.2 (H) /  101 (H)   4.5 26 4.36 (H) \       ALT: N/A AST: N/A AP: N/A TBILI: N/A   ALB: 3.2 (L) TOT PROTEIN: N/A LIPASE: N/A

## 2023-03-30 NOTE — PLAN OF CARE
Goal Outcome Evaluation:  3/29/23, 3 p to 7 p  Orientation: A&O to self and family, waxes and wanes, can make his needs known    Vitals/Tele: BP soft, Tylenol for generalized pain    IV Access/drains: L forearm Fistula, R chest pot    Diet: Regular, Mildly thickened liquid    Mobility: A1/2 GB and W    GI/: Hemodialysis today    Wound/Skin: Yellow, clean and dry    Consults: Nephro    Discharge Plan: Pending placement      See Flow sheets for assessment

## 2023-03-30 NOTE — PLAN OF CARE
Goal Outcome Evaluation:  DATE & TIME: 3/30/2023 [id number]  Cognitive Concerns/ Orientation: Disoriented to time and situation with confusion at times   BEHAVIOR & AGGRESSION TOOL COLOR: Green         ABNL VS/O2: VSS on RA  MOBILITY: Assist-2 gait belt/walker. 10 lb weight bear limit on L arm d/t new fistula.  Up in chair for meals, up w/PT x1  PAIN MANAGMENT: Chronic low back pain; refused both lidocaine and Icy hot patches.  Tylenol given x1 effective per pt report  DIET: Regular diet, mildly thickened liquids (level 2); takes pills without difficulty  BOWEL/BLADDER: Hemodialysis. BM x 3 this today  ABNL LAB/BG: Cr 4.36, alb 3.2, WBC 3.4  DRAIN/DEVICES: R chest CVC, double lumen for hemodialysis; PEG tube clamped; L fistula placed 3/21/23 with strong bruit/thrill present. Sutures open to air and CDI, no redness or swelling, no pain at site per pt report.  SKIN: Mild jaundice/pale; Scattered bruising; L forearm skin tear covered; L fistula site with sutures intact   PROCEDURES: Dialysis MWF  D/C DATE: Pending placement to TCU.  OTHER IMPORTANT INFO: Resting between cares, Sitter at bedside of day, family at bedside this evening.

## 2023-03-30 NOTE — PROGRESS NOTES
Renal Medicine Progress Note            Assessment/Plan:     Assessment:  1) ESRD on MWF chronic dialysis via right tunneled CVC    Blood pressure, hemodynamics/BP's appear good.    Seen by [facility] surgery .  S/p L arm AVF 3/21/23. RIJ catheter, MWF schedule, 3h, heparin with run.      2) Hypercalcemia, secondary to immobalization. Noted high bone turnover state, suppressed PTH.  Plan for anti-resorptive (denosumab or zoledronic acid) if gets more severe (>12.0 corrected calcium). Assessing daily. Currently corrected calcium is on the cusp.     3) Anemia:    Somewhat epo resistant. Getting retacrit 10,000 units most runs, now escalated to 20K . Adequate iron stores with ferritin 252 and sats 26%.        Plan/Recs:  1) HD tomorrow   2) EPO 20K with HD  3) s/p AVF  4) corrected calcium 11.9 today. Discussed with wife at bedside, she would like to hold off on prolia unless calcium doesn't improve. Albumin levels have improved dramatically resulting in some improvement in calcium. Will continue to monitor this week, if doesn't improve, will discuss again as hypercalcemia might be contributing to his delirium      Dwayne Laboy MD   [facility] - Nephrology   [phone]          Interval History:     - no acute events overnight   - remains on 1:1   - denies SOB   - denies other complaints   - tolerated HD well yesterday          Medications and Allergies:       - MEDICATION INSTRUCTIONS for Dialysis Patients -   Does not apply See Admin Instructions     apixaban ANTICOAGULANT  5 mg Oral BID     folic acid  1 mg Oral or Feeding Tube Daily     guaiFENesin  600 mg Oral BID     lacosamide  100 mg Oral Q12H     lactulose  10 g Oral BID     levETIRAcetam  1,000 mg Oral Q24H     lidocaine   Transdermal Q8H BIJU     menthol   Transdermal Q8H     midodrine  10 mg Oral or Feeding Tube TID w/meals     mineral oil-hydrophilic petrolatum   Topical Daily     multivitamin RENAL  1 capsule Oral Daily     OLANZapine zydis   5 mg Oral BID     pantoprazole  40 mg Oral QAM AC     ramelteon  8 mg Oral QPM     rifaximin  550 mg Oral or Feeding Tube BID     rOPINIRole  0.5 mg Oral At Bedtime     sevelamer carbonate  800 mg Oral TID w/meals     sodium chloride (PF)  3 mL Intracatheter Q8H     tacrolimus  1 mg Oral Q12H      No Known Allergies         Physical Exam:   Vitals were reviewed  /65 (BP Location: Right arm)   Pulse 83   Temp 97.5  F (36.4  C) (Axillary)   Resp 16   Wt 76.1 kg (167 lb 12.3 oz)   SpO2 94%   BMI 22.75 kg/m      Wt Readings from Last 3 Encounters:   03/29/23 76.1 kg (167 lb 12.3 oz)   01/21/23 82.4 kg (181 lb 11.2 oz)   12/28/22 96 kg (211 lb 10.3 oz)     Gen: awake, alert, NAD   HEENT: MMM   CV: RRR   Resp: clear   Ext: warm, well perfused, no peripheral edema   Neuro: alert, oriented, normal speech, slightly confused   Lines: RIJ TDC clean and intact          Data:     BMP  Recent Labs   Lab 03/30/23  0859 03/29/23  0800 03/28/23  0958 03/27/23  0839    132* 138 135*   POTASSIUM 4.5 5.4* 4.7 5.1   CHLORIDE 99 94* 100 98   KELLY 11.1* 11.6* 11.1* 11.4*   CO2 26 25 25 25   BUN 55.2* 83.6* 66.9* 88.9*   CR 4.36* 5.20* 4.54* 5.59*   * 93 121* 99     CBC  Recent Labs   Lab 03/30/23  0859   WBC 3.4*   HGB 8.4*   HCT 28.3*      *     Lab Results   Component Value Date    AST 20 03/14/2023    ALT 8 (L) 03/14/2023    ALKPHOS 177 (H) 03/14/2023    BILITOTAL 0.4 03/14/2023    CHANDLER 69 (H) 03/25/2023     Lab Results   Component Value Date    INR 1.36 (H) 12/21/2022       Attestation:  I have reviewed today's vital signs, notes, medications, labs and imaging.    Dwayne Laboy MD  [facility] - Nephrology

## 2023-03-30 NOTE — PLAN OF CARE
Goal Outcome Evaluation:         Cognitive Concerns/ Orientation: Disoriented to place and situation with confusion at times   BEHAVIOR & AGGRESSION TOOL COLOR: Green         ABNL VS/O2: VSS on RA  MOBILITY: Assist-2 gait belt/walker. 10 lb weight bear limit on L arm d/t new fistula.    PAIN MANAGMENT: Chronic low back pain; refused both lidocaine and Icyhot patches.  Tylenol PRN  DIET: Regular diet, mildly thickened liquids (level 2); takes pills without difficulty  BOWEL/BLADDER: Hemodialysis. BM x 3 this evening  ABNL LAB/BG: Ionized Ca 5.8, albumin 3.0  DRAIN/DEVICES: R chest CVC, double lumen for hemodialysis; PEG tube clamped; L fistula placed 3/21/23 with strong bruit/thrill present.  SKIN: Mild jaundice/pale; Scattered bruising; L forearm skin tear covered; L fistula site with sutures intact   PROCEDURES: Dialysis MWF  D/C DATE: Pending placement to TCU.  OTHER IMPORTANT INFO: Resting between cares, slept for approx 2 hours at a time tonight.

## 2023-03-30 NOTE — PLAN OF CARE
Goal Outcome Evaluation:      Plan of Care Reviewed With: patient    Overall Patient Progress: decliningOverall Patient Progress: declining    Outcome Evaluation: More often eating 0-25%, sometimes up to %. Back up bolus TF have not been utilized- still available as intervention as needed. See RD note.

## 2023-03-30 NOTE — PROGRESS NOTES
CLINICAL NUTRITION SERVICES - REASSESSMENT NOTE    Recommendations Ordered by Registered Dietitian (RD):   - Continue supplements, Room service assistance    - Pt should still be getting a back up TF bolus if he eats <50% of meals. Flush 60 mL before and after bolus.     Novasource Renal at 80 mL/hr x 3 hours PRN back up bolus if consumes <50%.   Per Bolus Provisions: 240 mL formula = 480 kcal, 22 g protein, 44 g CHO, 0 g fiber and 172 mL free water     Malnutrition:  (3/16)  % Weight Loss:  > 5% in 1 month (severe malnutrition)   % Intake:  No longer applies   Subcutaneous Fat Loss:  Orbital region moderate depletion, Upper arm region moderate-severe depletion and Thoracic region moderate-severe depletion  Muscle Loss:  Temporal region moderate depletion, Clavicle bone region severe depletion, Acromion bone region severe depletion, Patellar region moderate depletion and Anterior thigh region moderate-severe depletion  Fluid Retention:  Trace     Malnutrition Diagnosis: Severe malnutrition  In Context of:  Acute on Chronic illness or disease     EVALUATION OF PROGRESS TOWARD GOALS   Diet: Regular diet + Mildly thick liquids  Snacks: Gelatein+ w/ breakfast and lunch; Magic cup w/ Dinner  Supplements PRN as well.   Room service w/ assist     Nutrition Support: Back up bolus only (Has not been utilized). Novasource Renal at 80 mL/hr x 3 hours PRN back up bolus if consumes <50%.   Per Bolus Provisions: 240 mL formula = 480 kcal, 22 g protein, 44 g CHO, 0 g fiber and 172 mL free water  Free Water Flush: 100 mL before and after each feeding    Intake/Tolerance:   - Intakes are more sporadic lately. At times he eats well, other times he only eats 0-25%. Pt was drowsy during visit so not able to converse with him about his meals and snacks. Sitter in room reports 50% for breakfast this morning, but yesterday was eating 25% of observed meals. Pt has been liking fruit more than anything lately. Sitter has not observed pt  eating any of the snacks in room, but it is likely he eats them more when family is in room to help encourage him.   - Has not been receiving bolus feedings.     - Weight continues to trend down. Lowest wt of admission measured yesterday, 76.1 kg (167 lb).  - Stooling -   3/29 BM x4 yesterday  3/28 BM x6-8  3/27 BM x5-6  - Labs - reviewed.    - Meds: Folic Acid, Lactulose BID (should be titrating to 2-3 soft BMs/day), Renal multivitamin   - Skin: Kareem total score 17. Per RN - Mild jaundice/pale. Scattered bruising, L forearm tear.     ASSESSED NUTRITION NEEDS:  Dosing Weight (3/29) 76.1 kg   Estimated Energy Needs: 2865-0621+ kcals (25-30+ Kcal/Kg)  Justification: dialysis  Estimated Protein Needs: + grams protein (1.2-1.5+ g pro/Kg)  Justification: dialysis    NEW FINDINGS:   - HD MWF. No HD today.     Previous Goals:   Intake of >/=75% meals + supplements on average.   Evaluation: Not met    Previous Nutrition Diagnosis:   No nutrition diagnosis identified at this time  Evaluation: Declining    CURRENT NUTRITION DIAGNOSIS  Inadequate oral intake related to fluctuations in appetite, prolonged admission as evidenced by more often eating 0-50% of meals the past several days.     INTERVENTIONS  Recommendations / Nutrition Prescription  - Continue supplements, Room service assistance    - Pt should still be getting a back up TF bolus if he eats <50% of meals. Flush 60 mL before and after bolus.     Implementation  EN Composition, EN Schedule, Feeding Tube Flush: re-entered / adjusted TF orders.   Collaboration and Referral of Nutrition care: spoke to sitter in room    Goals  Intake of >/=75% meals + supplements on average.     MONITORING AND EVALUATION:  Progress towards goals will be monitored and evaluated per protocol and Practice Guidelines    Edna Osuna RD, LD  [facility], [facility], [facility]  Pager: [phone]  Weekend Pager: [phone]

## 2023-03-31 ENCOUNTER — APPOINTMENT (OUTPATIENT)
Dept: SPEECH THERAPY | Facility: CLINIC | Age: 58
DRG: 981 | End: 2023-03-31
Attending: STUDENT IN AN ORGANIZED HEALTH CARE EDUCATION/TRAINING PROGRAM
Payer: COMMERCIAL

## 2023-03-31 LAB
ALBUMIN SERPL BCG-MCNC: 3.3 G/DL (ref 3.5–5.2)
ANION GAP SERPL CALCULATED.3IONS-SCNC: 13 MMOL/L (ref 7–15)
BUN SERPL-MCNC: 71.3 MG/DL (ref 6–20)
CALCIUM SERPL-MCNC: 11.2 MG/DL (ref 8.6–10)
CHLORIDE SERPL-SCNC: 96 MMOL/L (ref 98–107)
CREAT SERPL-MCNC: 5.22 MG/DL (ref 0.67–1.17)
DEPRECATED HCO3 PLAS-SCNC: 25 MMOL/L (ref 22–29)
GFR SERPL CREATININE-BSD FRML MDRD: 12 ML/MIN/1.73M2
GLUCOSE SERPL-MCNC: 96 MG/DL (ref 70–99)
HBV SURFACE AG SERPL QL IA: NONREACTIVE
MAGNESIUM SERPL-MCNC: 2.1 MG/DL (ref 1.7–2.3)
PHOSPHATE SERPL-MCNC: 7.4 MG/DL (ref 2.5–4.5)
POTASSIUM SERPL-SCNC: 4.9 MMOL/L (ref 3.4–5.3)
SODIUM SERPL-SCNC: 134 MMOL/L (ref 136–145)

## 2023-03-31 PROCEDURE — 634N000001 HC RX 634: Performed by: STUDENT IN AN ORGANIZED HEALTH CARE EDUCATION/TRAINING PROGRAM

## 2023-03-31 PROCEDURE — 258N000003 HC RX IP 258 OP 636: Performed by: STUDENT IN AN ORGANIZED HEALTH CARE EDUCATION/TRAINING PROGRAM

## 2023-03-31 PROCEDURE — 250N000013 HC RX MED GY IP 250 OP 250 PS 637: Performed by: STUDENT IN AN ORGANIZED HEALTH CARE EDUCATION/TRAINING PROGRAM

## 2023-03-31 PROCEDURE — 99232 SBSQ HOSP IP/OBS MODERATE 35: CPT | Performed by: HOSPITALIST

## 2023-03-31 PROCEDURE — 90935 HEMODIALYSIS ONE EVALUATION: CPT | Performed by: STUDENT IN AN ORGANIZED HEALTH CARE EDUCATION/TRAINING PROGRAM

## 2023-03-31 PROCEDURE — 80069 RENAL FUNCTION PANEL: CPT | Performed by: STUDENT IN AN ORGANIZED HEALTH CARE EDUCATION/TRAINING PROGRAM

## 2023-03-31 PROCEDURE — 250N000011 HC RX IP 250 OP 636: Performed by: STUDENT IN AN ORGANIZED HEALTH CARE EDUCATION/TRAINING PROGRAM

## 2023-03-31 PROCEDURE — 87340 HEPATITIS B SURFACE AG IA: CPT | Performed by: STUDENT IN AN ORGANIZED HEALTH CARE EDUCATION/TRAINING PROGRAM

## 2023-03-31 PROCEDURE — 120N000001 HC R&B MED SURG/OB

## 2023-03-31 PROCEDURE — 92526 ORAL FUNCTION THERAPY: CPT | Mod: GN

## 2023-03-31 PROCEDURE — 250N000013 HC RX MED GY IP 250 OP 250 PS 637: Performed by: HOSPITALIST

## 2023-03-31 PROCEDURE — 83735 ASSAY OF MAGNESIUM: CPT | Performed by: STUDENT IN AN ORGANIZED HEALTH CARE EDUCATION/TRAINING PROGRAM

## 2023-03-31 PROCEDURE — 90937 HEMODIALYSIS REPEATED EVAL: CPT

## 2023-03-31 PROCEDURE — 250N000013 HC RX MED GY IP 250 OP 250 PS 637: Performed by: PSYCHIATRY & NEUROLOGY

## 2023-03-31 PROCEDURE — 36415 COLL VENOUS BLD VENIPUNCTURE: CPT | Performed by: STUDENT IN AN ORGANIZED HEALTH CARE EDUCATION/TRAINING PROGRAM

## 2023-03-31 PROCEDURE — 250N000012 HC RX MED GY IP 250 OP 636 PS 637: Performed by: STUDENT IN AN ORGANIZED HEALTH CARE EDUCATION/TRAINING PROGRAM

## 2023-03-31 RX ORDER — HEPARIN SODIUM 1000 [USP'U]/ML
500 INJECTION, SOLUTION INTRAVENOUS; SUBCUTANEOUS CONTINUOUS
Status: DISCONTINUED | OUTPATIENT
Start: 2023-03-31 | End: 2023-04-02

## 2023-03-31 RX ORDER — OXYCODONE HYDROCHLORIDE 5 MG/1
5 TABLET ORAL ONCE
Status: COMPLETED | OUTPATIENT
Start: 2023-03-31 | End: 2023-03-31

## 2023-03-31 RX ADMIN — MIDODRINE HYDROCHLORIDE 10 MG: 5 TABLET ORAL at 07:25

## 2023-03-31 RX ADMIN — ACETAMINOPHEN 650 MG: 325 TABLET, FILM COATED ORAL at 00:46

## 2023-03-31 RX ADMIN — SEVELAMER CARBONATE 800 MG: 800 TABLET, FILM COATED ORAL at 08:21

## 2023-03-31 RX ADMIN — TACROLIMUS 1 MG: 1 CAPSULE ORAL at 08:20

## 2023-03-31 RX ADMIN — OXYCODONE HYDROCHLORIDE 5 MG: 5 TABLET ORAL at 08:20

## 2023-03-31 RX ADMIN — PANTOPRAZOLE SODIUM 40 MG: 40 TABLET, DELAYED RELEASE ORAL at 08:21

## 2023-03-31 RX ADMIN — RIFAXIMIN 550 MG: 550 TABLET ORAL at 08:21

## 2023-03-31 RX ADMIN — MIDODRINE HYDROCHLORIDE 10 MG: 5 TABLET ORAL at 18:41

## 2023-03-31 RX ADMIN — ACETAMINOPHEN 650 MG: 325 TABLET, FILM COATED ORAL at 04:38

## 2023-03-31 RX ADMIN — GUAIFENESIN 600 MG: 600 TABLET, EXTENDED RELEASE ORAL at 21:10

## 2023-03-31 RX ADMIN — FOLIC ACID 1 MG: 1 TABLET ORAL at 12:40

## 2023-03-31 RX ADMIN — OLANZAPINE 5 MG: 5 TABLET, ORALLY DISINTEGRATING ORAL at 08:21

## 2023-03-31 RX ADMIN — GUAIFENESIN 600 MG: 600 TABLET, EXTENDED RELEASE ORAL at 08:21

## 2023-03-31 RX ADMIN — RAMELTEON 8 MG: 8 TABLET, FILM COATED ORAL at 21:09

## 2023-03-31 RX ADMIN — ROPINIROLE HYDROCHLORIDE 0.5 MG: 0.25 TABLET, FILM COATED ORAL at 21:09

## 2023-03-31 RX ADMIN — LACOSAMIDE 100 MG: 100 TABLET, FILM COATED ORAL at 12:40

## 2023-03-31 RX ADMIN — APIXABAN 5 MG: 5 TABLET, FILM COATED ORAL at 21:09

## 2023-03-31 RX ADMIN — HEPARIN SODIUM 500 UNITS: 1000 INJECTION INTRAVENOUS; SUBCUTANEOUS at 09:11

## 2023-03-31 RX ADMIN — HEPARIN SODIUM 500 UNITS/HR: 1000 INJECTION INTRAVENOUS; SUBCUTANEOUS at 09:12

## 2023-03-31 RX ADMIN — HEPARIN SODIUM 2600 UNITS: 1000 INJECTION INTRAVENOUS; SUBCUTANEOUS at 09:13

## 2023-03-31 RX ADMIN — EPOETIN ALFA-EPBX 20000 UNITS: 10000 INJECTION, SOLUTION INTRAVENOUS; SUBCUTANEOUS at 09:11

## 2023-03-31 RX ADMIN — TACROLIMUS 1 MG: 1 CAPSULE ORAL at 21:09

## 2023-03-31 RX ADMIN — LEVETIRACETAM 1000 MG: 500 TABLET, FILM COATED ORAL at 21:10

## 2023-03-31 RX ADMIN — APIXABAN 5 MG: 5 TABLET, FILM COATED ORAL at 08:21

## 2023-03-31 RX ADMIN — MIDODRINE HYDROCHLORIDE 10 MG: 5 TABLET ORAL at 12:39

## 2023-03-31 RX ADMIN — WHITE PETROLATUM: 1.75 OINTMENT TOPICAL at 08:19

## 2023-03-31 RX ADMIN — SEVELAMER CARBONATE 800 MG: 800 TABLET, FILM COATED ORAL at 12:39

## 2023-03-31 RX ADMIN — SODIUM CHLORIDE 250 ML: 9 INJECTION, SOLUTION INTRAVENOUS at 09:12

## 2023-03-31 RX ADMIN — RIFAXIMIN 550 MG: 550 TABLET ORAL at 21:09

## 2023-03-31 RX ADMIN — LACTULOSE 10 G: 10 SOLUTION ORAL at 08:20

## 2023-03-31 RX ADMIN — LACOSAMIDE 100 MG: 100 TABLET, FILM COATED ORAL at 00:46

## 2023-03-31 RX ADMIN — OLANZAPINE 5 MG: 5 TABLET, ORALLY DISINTEGRATING ORAL at 21:10

## 2023-03-31 RX ADMIN — SODIUM CHLORIDE 300 ML: 9 INJECTION, SOLUTION INTRAVENOUS at 09:12

## 2023-03-31 RX ADMIN — Medication 1 CAPSULE: at 12:39

## 2023-03-31 RX ADMIN — SEVELAMER CARBONATE 800 MG: 800 TABLET, FILM COATED ORAL at 18:41

## 2023-03-31 RX ADMIN — ACETAMINOPHEN 650 MG: 325 TABLET, FILM COATED ORAL at 21:20

## 2023-03-31 ASSESSMENT — ACTIVITIES OF DAILY LIVING (ADL)
ADLS_ACUITY_SCORE: 56
ADLS_ACUITY_SCORE: 56
ADLS_ACUITY_SCORE: 58
ADLS_ACUITY_SCORE: 56
ADLS_ACUITY_SCORE: 58
ADLS_ACUITY_SCORE: 58
ADLS_ACUITY_SCORE: 56
ADLS_ACUITY_SCORE: 58

## 2023-03-31 NOTE — PROGRESS NOTES
[facility]    Medicine Progress Note - Hospitalist Service    Date of Admission:  1/21/2023    Assessment & Plan   Blanco Osborne is a 57 year-old male admitted on 1/21/2023 as a transfer from [facility] for evaluation of loculated pleural effusion. He has extensive PMH including h/o liver transplant 2016 due to alcohol abuse, pAF on chronic anticoagulation, history of diabetes mellitus type 2, CVA, hypertension, CHRISTIAN on BiPAP.  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 He was recovering in [facility] and was transferred to [facility] for consideration of chest tube placement and possible intrapleural lysis for worsening effusion.     Acute metabolic encephalopathy with delirium, multifactorial, slowly improving?  Hepatic encephalopathy  Chronic liver disease, history of liver transplant 2016  End-stage renal disease (ESRD), on hemodialysis (HD)  History of seizure, on Keppra and Vimpat  Waxing and waning mentation, coinciding with lactulose being held at [facility]  Earlier in hospital stay, remained confused and had pulled out tracheostomy tube, PICC line and pulled his dialysis catheter.  Lines replaced.  Psychiatry consulted, recent follow-up 2/21, 2/28, see notes.    Mental status continues to fluctuate with periods of alertness and increased sleepiness, overall improving.    Complicated, prolonged hospital course with mental status concerns multifactorial related to hepatic encephalopathy, end-stage renal disease on dialysis, past PEA arrest, seizure disorder, medication, and chronic liver disease with prior liver transplant.  As mental status fluctuates will continue to require one-to-one sitter as needed for added safety and supervision.  Reassess daily.    Continue to reorient and  redirect as able.  Avoid restraints if possible with goals of safety and close supervision (given multiple lines and tubes - dialysis catheter, PEG tube, IV, etc).    Maintain normal day/night cycles and sleep-wake cycles.  Minimize sedating medications as able.  Remains weak and deconditioned with complex, prolonged hospital course and limited mobility.  Encouragement and support offered daily to patient.    Continue Lactulose to 10 mg BID, monitor for symptoms; goal to have 2-3 bowel movements per day.    Ongoing hemodialysis, as per nephrology, 3X/week dialysis schedule.    Nephrology consult follow-up appreciated, dialysis patient.    Continue ramelteon 8 mg at bedtime; monitor for side effects including AM sedation, reassess daily.    Continue olanzapine (Zyprexa) 5 mg bid and as needed; monitor for side effects including sedation.    Seizure disorder stable, continue Keppra and Vimpat; monitor, no recurrent seizures reported of recent.    Reconsult psychiatry as needed.  Neurology consult appreciated.  Patient has been started on ropinirole for restless leg syndrome.     Bilateral pleural effusions   Acute respiratory failure requiring tracheostomy  - resolved    Pneumonia  - resolved   ARDS  - resolved    Right pneumothorax - resolved   *Initial event was parainfluenza and strep pneumo pneumonia back in November 2022. Treated for ARDS. Had failed extubation x2 and tracheostomy performed on previous hospitalization. *Had additional complications of bilateral pneumothoraces as well as hydropneumothorax- pleural fluid grew candida parapsilosis  *Completed 4-week antifungal treatment. While in LTACH he was successfully weaned from the ventilator.  *Recently there were concern for worsening effusion while at LTACH and had thoracentesis 01/13 which returned exudative without growth on cultures. CT chest/abdomen/pelvis on 01/18 demonstrated worsening effusions and concerns for infected parapneumonic effusions with  possible SBP.  Multiple pulmonologist at LTACH reviewed CT scan recommended transfer to [facility] for consideration of chest tube placement and possible intrapleural lysis  *Thoracic surgery (Dr. Jackson) consulted during admission   *CT chest 1/22, small effusions on imaging and so chest tube was not inserted.   ID consulted, see note 2/10.  *Patient pulled out his tracheostomy tube on the night 2/1-2/2 and is tolerating well without increased shortness of breath persistent cough or worsening hypoxia.  * Chest x-ray 2/3 with cardiomegaly, chronic small bilateral pleural effusions, no pulmonary edema; right internal jugular dialysis catheter in place.   3/12 Pulmonary consult, see note, concerns of hypercapnia, atelectasis, with consideration of BiPAP trial; no recommendations for antibiotics with infiltrates felt to be atelectasis.    Stable, continue to monitor respiratory status, recently stable on room air; occasional cough reported.    Encourage incentive spirometry as able, up to chair, deep breathing.    Wound care previously consulted for tracheostomy site care, monitor for signs and symptoms of infection, healing well - resolved.    Encourage ambulation.    Possible splenic infract  Wedge shaped area on CT scan chest and CT abdomen on 3/7/23.    Hematology consulted, suspicion for infarct was low.    TTE no thrombus or vegetations.    No abdominal pain CTM.     S/p liver transplant 2016   Chronic immunosuppression, on tacrolimus  Cirrhosis with ascites  Subacute bacterial peritonitis (SBP), resolved  *Main manifestations of this are hepatic encephalopathy and ascites.  Hepatologist at [facility] felt that most likely reason for the cirrhosis was metabolic syndrome or alcohol intake.  There was no evidence of rejection on biopsy and there was some evidence of regeneration. Paracentesis done on 12/22/2022 showed lactobacillus indicating SBP, treated with Unasyn and Augmentin, finished 2-week course 1/12/2023.  " Requires large-volume paracentesis periodically for recurring ascites.    *Paracentesis 1/13/2023 yielded about 7500 cc. Cultures NGTD. paracentesis on 1/24 with 4.8 L fluid removal  Paracentesis on 3/6/23 No SBP    Continue intermittent paracentesis as needed if develops symptomatic ascites.    Monitor for signs and symptoms of recurrent spontaneous bacterial peritonitis.    Further treatment for recurrent ascites with TIPS deferred to his Liver Transplant team and/or Hepatology, he has an appointment on 4/21/2023 with Hepatology, Dr. Simms.    Continue Tacrolimus 1 mg BID, rifaximin 550 mg BID.    Continue lactulose as ordered, titrate as needed to have 2-3 bms.    GI consult as needed in hospital for new acute issues, otherwise as outpatient.    Encourgae high protein diet.    CT abdomen on March 9, 2023 showed small to moderate ascites.    Goals of care   As per prior rounding provider, \"on 1/25 nursing had mentioned that patient had refused to undergo dialysis and want to stop care, palliative care was consulted.  Patient and his spouse denied wanting to stop care and wanted ongoing restorative care including full code\"    Monitor, Full Code status.    Needs placement to TCU.     Diarrhea, improved, on lactulose for chronic liver disease    C difficile negative on 1/31. Secondary to lactulose. Discontinued rectal tube (2/12) as stools more formed.    Continue lactulose with goal of 2 to 3 soft bowel movement in 24 hours.     Paroxysmal atrial fibrillation  Chronic anticoagulation, apixaban  History of embolic strokes  Remains in sinus rhythm, on anticoagulation with apixaban indefinitely for stroke prophylaxis.      Monitor rhythm.    Continue apixaban anticoagulation, it was briefly held for fistula placement.    Monitor hemoglobin, see below.     Acute anemia  Patient has required intermittent transfusions for on-going anemia.  Anemia most likely secondary to critical illness/chronic disease, chronic renal " disease.  Baseline hemoglobin around 7-8.  Likely Epo resistance.    Hemoglobin stable at 8.4 on 3/30/23.    Monitor intermittently.    Hypercalcemia  Josue hypercalcemia of Immobility    Nephrology following.    Hold VIT D.    Continue to hold Phoslo.    Monitor labs.    Encourage ambulation.    Nephrology considering prolia, however patient's wife would like to hold off for now.    History PEA arrest   PEA arrest prior to his previous admission and 1 episode of PEA associated with desaturation on 12/20.  No structural cardiac disease.     Stable, continue cardiac Monitoring.     Chronic Hypotension  In the past has developed baseline hypotension with cirrhosis and prolonged critical illness.  On hemodialysis.  Receiving midodrine and albumin during dialysis.    Continue midodrine, as per nephrology.      History of seizure   After hypoxic event, in the context of baseline multifactorial encephalopathy secondary to his ongoing critical illness and prior cardiac arrests and prior strokes and cirrhosis with hepatic encephalopathy. MRI of head of 12/20/22 reveals no PRES or leptomeningeal enhancement but scattered.  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   No recent seizure activity.    Continue levetiracetam, lacosamide.    Seizure precautions.    Outpatient follow-up with neurology, consult inpatient if needed.     ESRD  Anemia due to renal disease  Hypotension during dialysis  Hyperphosphatemia    As per nephrology.    Nephrology reviewing vein mapping to assess options for internal access placement for dialysis, see note 3/16.    Underwent Fistula placement on  3/21/22.    Vascular surgery following-continue to use for tunnel catheter.  Outpatient follow-up with vascular surgery and for will need a follow-up ultrasound of the fistula in 6 weeks to assess patency.    Started sevalmer 800mg TID with meals on 3/27/23.    Gave one time dose of oxycodone 5 mg on  3/31/23 prior to dialysis due to patient complaint of cramping with recent dialysis. If this is effective and does not cause sedation/side effects, could consider a PRN dose to be used prior to dialysis on dialysis days.     Diabetes mellitus type 2  *Hemoglobin A1c on November 2022 was 4.7  *By report, on Lantus insulin in the past.  Blood sugar checks and sliding scale insulin previously discontinued as has been stable without insulin needs.    Monitor with periodic blood sugar checks as needed.    Hypomagnesemia    Resolved with replacement.     Severe malnutrition, protein and calorie type  Moderate dysphagia  G-tube feedings    Continue with tube feeds via PEG tube.    IR replaced the PEG tube on 2/8/2023, monitor.    Nutritionist consulted and following for tube feeds.    SLP following as well. Oral diet as per speech and language therapy (SLP). Patient hopeful to advance to thin liquids soon.    2/20 Nutrition following for tube feeds.    Now undergoing PRN tube feeds.    Encourage high-protein diet.    Nutrition notes reviewed, patient eating 0 to 100% of meals.  Continue current interventions.  As needed tube feed boluses available if patient consumes less than 50% of the meal.    Family encouraging patient to eat high protein diet including protein bars.     Anxiety  Fluoxetine was discontinued as per psychiatry recommendation on 2/2 (see consult note for details).    Stable, monitor; comfort and reassurance offered during visit.    Psychiatry follow-up.    Restless leg  Syndrome    Patient started on ropiranole by neurology.       Diet: Room Service  Snacks/Supplements Adult: Other; Gelatein+ with brkfst and lunch, and magic cup with dinner (RD); With Meals  Combination Diet Regular Diet; Mildly Thick (level 2) (OK for fresh fruit (e.g., pineapple) per SLP)  Adult Formula Bolus Feeding: Novasource Renal; Route: Gastrostomy; 3 times daily; Volume per Bolus: 240; mL(s); Back up bolus for when pt consumes  <50% of meals: 80 mL/hr x 3 hrs    DVT Prophylaxis: DOAC  Nixon Catheter: Not present  Lines: PRESENT      CVC Double Lumen Right External jugular Tunneled-Site Assessment: WDL  Hemodialysis Vascular Access Arteriovenous fistula Left Forearm-Site Assessment: WDL      Cardiac Monitoring: None  Code Status: Full Code      Clinically Significant Risk Factors           # Hypercalcemia: Highest Ca = 11.2 mg/dL in last 2 days, will monitor as appropriate    # Hypoalbuminemia: Lowest albumin = 1.9 g/dL at 1/23/2023  6:38 AM, will monitor as appropriate   # Thrombocytopenia: Lowest platelets = 102 in last 2 days, will monitor for bleeding          # Severe Malnutrition: based on nutrition assessment        Disposition Plan     Expected Discharge Date: 04/05/2023, 12:00 PM  Discharge Delays: Placement - LTC  Destination: inpatient rehabilitation facility  Discharge Comments: Dialysis pt MWF. Will need placement TCU/LTC. [facility] willing to accept once no sitter, SW to follow up when sitter free          Elvis Mills MD  Hospitalist Service  [facility]  Securely message with [employer] (more info)  Text page via [employer] Paging/Directory   ______________________________________________________________________    Interval History   Blanco Osborne was seen this morning. He is anxious to get down to dialysis. Has been having cramping with dialysis recently, wants to try some different pain medication for this. No other complaints/concerns.    Physical Exam   Vital Signs: Temp: 97.8  F (36.6  C) Temp src: Oral BP: 114/72 Pulse: 78   Resp: 16 SpO2: 91 % O2 Device: None (Room air)    Weight: 180 lbs 15.96 oz    Constitutional: awake, alert, cooperative, no apparent distress, laying in the hospital bed  Respiratory: no increased work of breathing, clear to auscultation bilaterally, no crackles or wheezing  Cardiovascular: regular rate and rhythm, normal S1 and S2, no murmur noted  GI: normal bowel sounds,  soft, non-distended, non-tender  Skin: warm, dry  Musculoskeletal: no lower extremity pitting edema present  Neurologic: awake, alert, appropriate in conversation, moves all extremities    Medical Decision Making       35 MINUTES SPENT BY ME on the date of service doing chart review, history, exam, documentation & further activities per the note.      Data     I have personally reviewed the following data over the past 24 hrs:    3.4 (L)  \   8.4 (L)   / 102 (L)     134 (L) 96 (L) 71.3 (H) /  96   4.9 25 5.22 (H) \       ALT: N/A AST: N/A AP: N/A TBILI: N/A   ALB: 3.3 (L) TOT PROTEIN: N/A LIPASE: N/A

## 2023-03-31 NOTE — PLAN OF CARE
Goal Outcome Evaluation: [id number]    A&O x2, disoriented to time and situation. VSS on RA. Sitter in room. A1 GB/RW to BSC. Tolerating regular diet with mildly thickened liquids. Swallows pills without difficulty. PRN Tylenol x2 for chronic low back pain. Left lower forearm skin tear covered with mepilex. Magnesium protocol, recheck this am.     Will have dialysis this am. Midodrine given. Will need placement to TCU/LTC.

## 2023-03-31 NOTE — PROGRESS NOTES
Renal Medicine Progress Note            Assessment/Plan:     Assessment:  1) ESRD on MWF chronic dialysis via right tunneled CVC    Blood pressure, hemodynamics/BP's appear good.    Seen by [facility] surgery .  S/p L arm AVF 3/21/23. RIJ catheter, MWF schedule, 3h, heparin with run.      2) Hypercalcemia, secondary to immobalization. Noted high bone turnover state, suppressed PTH.  Plan for anti-resorptive (denosumab or zoledronic acid) if gets more severe (>12.0 corrected calcium). Assessing daily. Currently corrected calcium is on the cusp.     3) Anemia:    Somewhat epo resistant. Getting retacrit 10,000 units most runs, now escalated to 20K . Adequate iron stores with ferritin 252 and sats 26%.        Plan/Recs:  1) HD today, tolerated 2L UF   2) EPO 20K with HD  3) s/p AVF  4) corrected calcium 11.8 today. Albumin levels have improved dramatically resulting in some improvement in calcium. Will continue to monitor this week, if doesn't improve, will discuss again as hypercalcemia might be contributing to his delirium. His wife would like to hold off for now.       Dwayne Laboy MD   [facility] - Nephrology             Interval History:     - no acute events overnight   - remains on 1:1  - seen during dialysis, tolerating it well   - denies complaints besides disliking dialysis          Medications and Allergies:       - MEDICATION INSTRUCTIONS for Dialysis Patients -   Does not apply See Admin Instructions     apixaban ANTICOAGULANT  5 mg Oral BID     folic acid  1 mg Oral or Feeding Tube Daily     guaiFENesin  600 mg Oral BID     sodium chloride (PF) 0.9%  10 mL Intracatheter Once in dialysis/CRRT    Followed by     heparin  1.3-2.6 mL Intracatheter Once in dialysis/CRRT     lacosamide  100 mg Oral Q12H     lactulose  10 g Oral BID     levETIRAcetam  1,000 mg Oral Q24H     lidocaine   Transdermal Q8H BIJU     menthol   Transdermal Q8H     midodrine  10 mg Oral or Feeding Tube TID w/meals      mineral oil-hydrophilic petrolatum   Topical Daily     multivitamin RENAL  1 capsule Oral Daily     OLANZapine zydis  5 mg Oral BID     pantoprazole  40 mg Oral QAM AC     ramelteon  8 mg Oral QPM     rifaximin  550 mg Oral or Feeding Tube BID     rOPINIRole  0.5 mg Oral At Bedtime     sevelamer carbonate  800 mg Oral TID w/meals     sodium chloride (PF)  3 mL Intracatheter Q8H     sodium chloride (PF) 0.9%  10 mL Intracatheter Once in dialysis/CRRT     tacrolimus  1 mg Oral Q12H      No Known Allergies         Physical Exam:   Vitals were reviewed  BP 96/61 (BP Location: Right arm)   Pulse 73   Temp 97.6  F (36.4  C) (Axillary)   Resp 16   Wt 82.1 kg (181 lb)   SpO2 96%   BMI 24.55 kg/m      Wt Readings from Last 3 Encounters:   03/31/23 82.1 kg (181 lb)   01/21/23 82.4 kg (181 lb 11.2 oz)   12/28/22 96 kg (211 lb 10.3 oz)     Gen: awake, alert, NAD   HEENT: MMM   CV: RRR   Resp: clear   Ext: warm, well perfused, no peripheral edema   Neuro: alert, oriented to self, normal speech, slightly confused   Lines: RIJ TDC clean and intact          Data:     BMP  Recent Labs   Lab 03/31/23  0711 03/30/23  0859 03/29/23  0800 03/28/23  0958   * 137 132* 138   POTASSIUM 4.9 4.5 5.4* 4.7   CHLORIDE 96* 99 94* 100   KELLY 11.2* 11.1* 11.6* 11.1*   CO2 25 26 25 25   BUN 71.3* 55.2* 83.6* 66.9*   CR 5.22* 4.36* 5.20* 4.54*   GLC 96 101* 93 121*     CBC  Recent Labs   Lab 03/30/23  0859   WBC 3.4*   HGB 8.4*   HCT 28.3*      *     Lab Results   Component Value Date    AST 20 03/14/2023    ALT 8 (L) 03/14/2023    ALKPHOS 177 (H) 03/14/2023    BILITOTAL 0.4 03/14/2023    CHANDLER 69 (H) 03/25/2023     Lab Results   Component Value Date    INR 1.36 (H) 12/21/2022       Attestation:  I have reviewed today's vital signs, notes, medications, labs and imaging.    Dwayne Laboy MD  [facility] - Nephrology

## 2023-03-31 NOTE — PROVIDER NOTIFICATION
MD Notification    Notified Person: MD    Notified Person Name: Dr Mills    Notification Date/Time: 03/31/23 4026    Notification Interaction: Vocera    Purpose of Notification:    Pt complains of severe cramping during recent dialysis runs. He is requesting something stronger for pain to premedicate prior to dialysis. Can we get something for pain before dialysis?    Orders Received:  ordered one time Oxycodone    Comments:

## 2023-03-31 NOTE — PROGRESS NOTES
"SPIRITUAL HEALTH SERVICES Progress Note    [facility]    Saw pt Blanco Osborne per follow-up plan of care.    Patient/Family Understanding of Illness and Goals of Care - Blanco was seated up in bed when I visited with him today. He noted that he just had dialysis earlier and affirmed that it tends to be a tiring process. I offered a moment of reflective conversation and support during this visit.      Distress and Loss - No specific areas of distress were named outside of the general stress of this extended hospital admission.      Strengths, Coping, and Resources - It was named that Blanco's spouse (Ofe) and brother (Dylan) provide regular support at the bedside. Ofe also named that they appreciate the SH support as social interaction has been really important for Blanco. In our conversation, it was also noted that Blanco appreciates to listen to sermons by Kb Fraser as a means of support.     Meaning, Beliefs, and Spirituality - Blanco said that he's \"still fighting\" and reflected on how he hears God speaking to him. He affirmed that a prayer would be meaningful and I offered this at the bedside today at the end of this visit. Of note, Ofe mentioned that someone would be coming in soon to provide communion to Blanco.      Plan of Care - I continue following Blanco during this admission to [facility]. Please consult if any additional needs arise.     ------  Vitor Palacios M.Div.  Resident   Pager: [phone]   "

## 2023-03-31 NOTE — PLAN OF CARE
Goal Outcome Evaluation:       A&O x2, disoriented to time and situation, forgetful and confused at times. VSS on RA. Ax2 w/ vgait belt and walker. Precaution: 10 lbs weight bear limit on L arm d/t fistula.  Chronic low back pain , PRN Tylenol given. LS diminished. HS-Irregular. Regular diet, mildly thickened liquids, take pills whole without difficult. R Chest CVC, PEG tube clamped. L Fistula. Scattered bruising. Dialysis on Friday. Pending placement.

## 2023-03-31 NOTE — PLAN OF CARE
03/31/23  [id number]  Fluid overload, encephalopathy, ESRD    Orientation JALYN this shift    Vitals/Tele VSS, soft BP, gave one time oxycodone before dialysis due to pt c/o of pain not fixed by tylenol    IV Access/drains fistula on L arm     Mg protocol    Diet Reg diet, thickened liquids    Mobility Ax1 GB/W    GI/ continent b/b     Wound/Skin scattered bruising      See Flow sheets for assessment

## 2023-03-31 NOTE — PROGRESS NOTES
Potassium   Date Value Ref Range Status   03/31/2023 4.9 3.4 - 5.3 mmol/L Final   12/29/2022 3.4 3.4 - 5.3 mmol/L Final   04/20/2020 3.9 3.4 - 5.3 mmol/L Final     Potassium POCT   Date Value Ref Range Status   01/19/2023 3.6 3.5 - 5.0 mmol/L Final     Hemoglobin   Date Value Ref Range Status   03/30/2023 8.4 (L) 13.3 - 17.7 g/dL Final   04/20/2020 14.3 13.3 - 17.7 g/dL Final     Creatinine   Date Value Ref Range Status   03/31/2023 5.22 (H) 0.67 - 1.17 mg/dL Final   04/20/2020 1.06 0.66 - 1.25 mg/dL Final     Urea Nitrogen   Date Value Ref Range Status   03/31/2023 71.3 (H) 6.0 - 20.0 mg/dL Final   12/29/2022 46 (H) 7 - 30 mg/dL Final   04/20/2020 23 7 - 30 mg/dL Final     Sodium   Date Value Ref Range Status   03/31/2023 134 (L) 136 - 145 mmol/L Final   04/20/2020 139 133 - 144 mmol/L Final     INR   Date Value Ref Range Status   12/21/2022 1.36 (H) 0.85 - 1.15 Final   10/07/2019 1.20 (H) 0.86 - 1.14 Final       DIALYSIS PROCEDURE NOTE  Hepatitis status of previous patient on machine log was checked and verified ok to use with this patients hepatitis status.  Patient dialyzed for 3 hrs. on a K2 bath with a net fluid removal of  2L.  A BFR of 350 ml/min was obtained via a RIJ catheter.   The treatment plan was discussed with  during the treatment.    Total heparin received during the treatment: 1500 units.  Line flushed, clamped and capped with heparin 1:1000 1.9 mL (1900 units) per lumen     Meds  given: EPO 20,000units IV   Complications: None, tx tolerated well, VSS throughout run       Person educated: pt. Knowledge base substantial. Barriers to learning: confusion. Educated on access care via verbal mode. Patient verbalized understanding. Pt prefers verbal education style.      ICEBOAT? Timeout performed pre-treatment  I: Patient was identified using 2 identifiers  C:  Consent Signed Yes  E: Equipment preventative maintenance is current and dialysis delivery system OK to use  B: Hepatitis B Surface  Antigen: neg; Draw Date: 3/1/2023      Hepatitis B Surface Antibody: succept; Draw Date: 3/9/2023  O: Dialysis orders present and complete prior to treatment  A: Vascular access verified and assessed prior to treatment  T: Treatment was performed at a clinically appropriate time  ?: Patient was allowed to ask questions and address concerns prior to treatment  See Adult Hemodialysis flowsheet in EPIC for further details and post assessment.  Machine water alarm in place and functioning. Transducer pods intact and checked every 15min.   Pt returned via bed.  Chlorine/Chloramine water system checked every 4 hours.  Outpatient Dialysis at [facility]     Patient repositioned every 2 hours during the treatment.  Post treatment report given to ANITHA Richardson RN regarding 2.5L of fluid removed, last BP of 91/59, and patient pain rating of 0/10.

## 2023-04-01 ENCOUNTER — APPOINTMENT (OUTPATIENT)
Dept: PHYSICAL THERAPY | Facility: CLINIC | Age: 58
DRG: 981 | End: 2023-04-01
Attending: STUDENT IN AN ORGANIZED HEALTH CARE EDUCATION/TRAINING PROGRAM
Payer: COMMERCIAL

## 2023-04-01 ENCOUNTER — HEALTH MAINTENANCE LETTER (OUTPATIENT)
Age: 58
End: 2023-04-01

## 2023-04-01 LAB
ALBUMIN SERPL BCG-MCNC: 3.4 G/DL (ref 3.5–5.2)
ANION GAP SERPL CALCULATED.3IONS-SCNC: 14 MMOL/L (ref 7–15)
BUN SERPL-MCNC: 44.5 MG/DL (ref 6–20)
CALCIUM SERPL-MCNC: 10.9 MG/DL (ref 8.6–10)
CHLORIDE SERPL-SCNC: 99 MMOL/L (ref 98–107)
CREAT SERPL-MCNC: 4.6 MG/DL (ref 0.67–1.17)
DEPRECATED HCO3 PLAS-SCNC: 25 MMOL/L (ref 22–29)
GFR SERPL CREATININE-BSD FRML MDRD: 14 ML/MIN/1.73M2
GLUCOSE SERPL-MCNC: 113 MG/DL (ref 70–99)
HOLD SPECIMEN: NORMAL
MAGNESIUM SERPL-MCNC: 2 MG/DL (ref 1.7–2.3)
PHOSPHATE SERPL-MCNC: 5.8 MG/DL (ref 2.5–4.5)
POTASSIUM SERPL-SCNC: 4.6 MMOL/L (ref 3.4–5.3)
SODIUM SERPL-SCNC: 138 MMOL/L (ref 136–145)

## 2023-04-01 PROCEDURE — 250N000013 HC RX MED GY IP 250 OP 250 PS 637: Performed by: STUDENT IN AN ORGANIZED HEALTH CARE EDUCATION/TRAINING PROGRAM

## 2023-04-01 PROCEDURE — 97116 GAIT TRAINING THERAPY: CPT | Mod: GP

## 2023-04-01 PROCEDURE — 250N000013 HC RX MED GY IP 250 OP 250 PS 637: Performed by: PSYCHIATRY & NEUROLOGY

## 2023-04-01 PROCEDURE — 99231 SBSQ HOSP IP/OBS SF/LOW 25: CPT | Performed by: HOSPITALIST

## 2023-04-01 PROCEDURE — 83735 ASSAY OF MAGNESIUM: CPT | Performed by: HOSPITALIST

## 2023-04-01 PROCEDURE — 250N000012 HC RX MED GY IP 250 OP 636 PS 637: Performed by: STUDENT IN AN ORGANIZED HEALTH CARE EDUCATION/TRAINING PROGRAM

## 2023-04-01 PROCEDURE — 36415 COLL VENOUS BLD VENIPUNCTURE: CPT | Performed by: HOSPITALIST

## 2023-04-01 PROCEDURE — 80069 RENAL FUNCTION PANEL: CPT | Performed by: STUDENT IN AN ORGANIZED HEALTH CARE EDUCATION/TRAINING PROGRAM

## 2023-04-01 PROCEDURE — 120N000001 HC R&B MED SURG/OB

## 2023-04-01 PROCEDURE — 97110 THERAPEUTIC EXERCISES: CPT | Mod: GP

## 2023-04-01 RX ORDER — UREA 8.5 G/85G
CREAM TOPICAL 2 TIMES DAILY PRN
Status: DISCONTINUED | OUTPATIENT
Start: 2023-04-01 | End: 2023-04-28 | Stop reason: HOSPADM

## 2023-04-01 RX ADMIN — FOLIC ACID 1 MG: 1 TABLET ORAL at 10:28

## 2023-04-01 RX ADMIN — MIDODRINE HYDROCHLORIDE 10 MG: 5 TABLET ORAL at 18:02

## 2023-04-01 RX ADMIN — SEVELAMER CARBONATE 800 MG: 800 TABLET, FILM COATED ORAL at 10:28

## 2023-04-01 RX ADMIN — Medication 1 CAPSULE: at 10:29

## 2023-04-01 RX ADMIN — RIFAXIMIN 550 MG: 550 TABLET ORAL at 10:29

## 2023-04-01 RX ADMIN — GUAIFENESIN 600 MG: 600 TABLET, EXTENDED RELEASE ORAL at 10:28

## 2023-04-01 RX ADMIN — APIXABAN 5 MG: 5 TABLET, FILM COATED ORAL at 10:29

## 2023-04-01 RX ADMIN — TACROLIMUS 1 MG: 1 CAPSULE ORAL at 20:26

## 2023-04-01 RX ADMIN — GUAIFENESIN 600 MG: 600 TABLET, EXTENDED RELEASE ORAL at 20:26

## 2023-04-01 RX ADMIN — MELATONIN 5 MG TABLET 10 MG: at 23:19

## 2023-04-01 RX ADMIN — MIDODRINE HYDROCHLORIDE 10 MG: 5 TABLET ORAL at 10:29

## 2023-04-01 RX ADMIN — LACOSAMIDE 100 MG: 100 TABLET, FILM COATED ORAL at 13:23

## 2023-04-01 RX ADMIN — SEVELAMER CARBONATE 800 MG: 800 TABLET, FILM COATED ORAL at 18:02

## 2023-04-01 RX ADMIN — LACOSAMIDE 100 MG: 100 TABLET, FILM COATED ORAL at 01:31

## 2023-04-01 RX ADMIN — OLANZAPINE 5 MG: 5 TABLET, ORALLY DISINTEGRATING ORAL at 20:26

## 2023-04-01 RX ADMIN — RIFAXIMIN 550 MG: 550 TABLET ORAL at 20:26

## 2023-04-01 RX ADMIN — RAMELTEON 8 MG: 8 TABLET, FILM COATED ORAL at 20:26

## 2023-04-01 RX ADMIN — MICONAZOLE NITRATE: 57 CREAM TOPICAL at 18:02

## 2023-04-01 RX ADMIN — TACROLIMUS 1 MG: 1 CAPSULE ORAL at 10:29

## 2023-04-01 RX ADMIN — PANTOPRAZOLE SODIUM 40 MG: 40 TABLET, DELAYED RELEASE ORAL at 10:29

## 2023-04-01 RX ADMIN — MIDODRINE HYDROCHLORIDE 10 MG: 5 TABLET ORAL at 13:23

## 2023-04-01 RX ADMIN — ACETAMINOPHEN 650 MG: 325 TABLET, FILM COATED ORAL at 20:26

## 2023-04-01 RX ADMIN — LEVETIRACETAM 1000 MG: 500 TABLET, FILM COATED ORAL at 20:26

## 2023-04-01 RX ADMIN — OLANZAPINE 5 MG: 5 TABLET, ORALLY DISINTEGRATING ORAL at 10:29

## 2023-04-01 RX ADMIN — LACTULOSE 10 G: 10 SOLUTION ORAL at 10:34

## 2023-04-01 RX ADMIN — SEVELAMER CARBONATE 800 MG: 800 TABLET, FILM COATED ORAL at 13:23

## 2023-04-01 RX ADMIN — MENTHOL 1 PATCH: 205.5 PATCH TOPICAL at 15:47

## 2023-04-01 RX ADMIN — LACOSAMIDE 100 MG: 100 TABLET, FILM COATED ORAL at 23:19

## 2023-04-01 RX ADMIN — ROPINIROLE HYDROCHLORIDE 0.5 MG: 0.25 TABLET, FILM COATED ORAL at 20:25

## 2023-04-01 RX ADMIN — APIXABAN 5 MG: 5 TABLET, FILM COATED ORAL at 20:27

## 2023-04-01 ASSESSMENT — ACTIVITIES OF DAILY LIVING (ADL)
ADLS_ACUITY_SCORE: 56

## 2023-04-01 NOTE — PROGRESS NOTES
Shift Summary [id number]    Admitting Diagnosis: Pleural effusion [J90]   Vitals WNL.   Pain : MILD low back pain this afternoon.  A&Ox3, confused about situation.   Voiding : Oliguria, d/t dialysis.   Mobility walker GB, Assist of one.   Tele NA.   CMS : WNL.   Lung Sounds clear on room air.    GI : abdominal distention. hxt of cirrhosis.   Dressing : CVC site on chest. Dressing CDI. Fistula site On left arm  is CDI.     Orders Placed This Encounter      Combination Diet Regular Diet; Mildly Thick (level 2) (OK for fresh fruit (e.g., pineapple) per SLP)     Sitter at bedside.     Plan: Awaiting placement.

## 2023-04-01 NOTE — PLAN OF CARE
Goal Outcome Evaluation:        [id number]    A&O x 3, disoriented to situation. Pt denied pain. VSS, on RA. Up w/ 1GB + Walker. PRN tylenol given for mild back pain. Bedside attendant continued.  Alarms on. Continue with plan of care.

## 2023-04-01 NOTE — PLAN OF CARE
Goal Outcome Evaluation:  A&OX2, disoriented to time and situation. VSS on RA except for low BP, on midodrine. S/p dialysis today. Up AX1 with GB and walker. Denies pain, n&V. Anuric. No IV access. Dialysis port and PEG tube intact. Regular diet with mildly thick liquid. Bedside attendant continued. Continue to monitor.

## 2023-04-01 NOTE — PLAN OF CARE
Goal Outcome Evaluation:       DATE & TIME: 3/31/2023 [id number]  Cognitive Concerns/ Orientation: A&O x2-3  Disoriented to time and situation with confusion at times   BEHAVIOR & AGGRESSION TOOL COLOR: Green         ABNL VS/O2: VSS on RA  MOBILITY: Assist-2 gait belt/walker. 10 lb weight bear limit on L arm d/t new fistula.    PAIN MANAGMENT: denies pain this shift  DIET: Regular diet, mildly thickened liquids (level 2); PRN takes pills without difficulty. Bolus feeding available if pt eats <50% of meals.  BOWEL/BLADDER: Hemodialysis. BM x 3 this today  ABNL LAB/BG: Na 134, Cr 5.22, alb 3.3, Ca 11.2, Phos 7.4  DRAIN/DEVICES: R chest CVC, double lumen for hemodialysis; PEG tube clamped; L fistula placed 3/21/23 with strong bruit/thrill present. Sutures open to air and CDI, no redness or swelling, no pain at site per pt report.  SKIN: Mild jaundice/pale; Scattered bruising; L forearm skin tear covered; L fistula site with sutures intact   PROCEDURES: Dialysis MWF  D/C DATE: Pending placement to TCU.  OTHER IMPORTANT INFO: Resting between cares, Sitter at bedside of day.

## 2023-04-01 NOTE — PLAN OF CARE
DATE & TIME: 4/1/23 at [id number]  Cognitive Concerns/ Orientation: A&O x2-3  Disoriented to time and situation with confusion at times   BEHAVIOR & AGGRESSION TOOL COLOR: Green         ABNL VS/O2: VSS on RA  MOBILITY: Assist x 1 gait belt/walker. 10 lb weight bear limit on L arm d/t new fistula.    PAIN MANAGMENT: Chronic back pain. Placed PRN icy hot patch on back and rotating with heat. Pt would like Tylenol at bedtime   DIET: Regular diet, mildly thickened liquids (level 2); PRN takes pills without difficulty. Bolus feeding available if pt eats <50% of meals.  BOWEL/BLADDER: BM x 3 today. Anuric   ABNL LAB/BG: Cr 4.60, BUN 44.5, Phos 5.8, Calcium 10.9   DRAIN/DEVICES: R chest CVC, double lumen for hemodialysis; PEG tube clamped; L fistula placed 3/21/23 with strong bruit/thrill present. Sutures open to air and CDI, no redness or swelling, no pain at site per pt report.  SKIN: Mild jaundice/pale; Scattered bruising; L forearm skin tear covered; L fistula site with sutures intact   PROCEDURES: Dialysis MWF  D/C DATE: Pending placement to TCU.  OTHER IMPORTANT INFO: Resting between cares

## 2023-04-01 NOTE — PROGRESS NOTES
[facility]    Medicine Progress Note - Hospitalist Service    Date of Admission:  1/21/2023    Assessment & Plan   Blanco Osborne is a 57 year-old male admitted on 1/21/2023 as a transfer from [facility] for evaluation of loculated pleural effusion. He has extensive PMH including h/o liver transplant 2016 due to alcohol abuse, pAF on chronic anticoagulation, history of diabetes mellitus type 2, CVA, hypertension, CHRISTIAN on BiPAP.  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 He was recovering in [facility] and was transferred to [facility] for consideration of chest tube placement and possible intrapleural lysis for worsening effusion.     Acute metabolic encephalopathy with delirium, multifactorial, slowly improving?  Hepatic encephalopathy  Chronic liver disease, history of liver transplant 2016  End-stage renal disease (ESRD), on hemodialysis (HD)  History of seizure, on Keppra and Vimpat  Waxing and waning mentation, coinciding with lactulose being held at [facility]  Earlier in hospital stay, remained confused and had pulled out tracheostomy tube, PICC line and pulled his dialysis catheter.  Lines replaced.  Psychiatry consulted, recent follow-up 2/21, 2/28, see notes.    Mental status continues to fluctuate with periods of alertness and increased sleepiness, overall improving.    Complicated, prolonged hospital course with mental status concerns multifactorial related to hepatic encephalopathy, end-stage renal disease on dialysis, past PEA arrest, seizure disorder, medication, and chronic liver disease with prior liver transplant.  As mental status fluctuates will continue to require one-to-one sitter as needed for added safety and supervision.  Reassess daily.    Continue to reorient and  redirect as able.  Avoid restraints if possible with goals of safety and close supervision (given multiple lines and tubes - dialysis catheter, PEG tube, IV, etc).    Maintain normal day/night cycles and sleep-wake cycles.  Minimize sedating medications as able.  Remains weak and deconditioned with complex, prolonged hospital course and limited mobility.  Encouragement and support offered daily to patient.    Continue Lactulose to 10 mg BID, monitor for symptoms; goal to have 2-3 bowel movements per day.    Ongoing hemodialysis, as per nephrology, 3X/week dialysis schedule.    Nephrology consult follow-up appreciated, dialysis patient.    Continue ramelteon 8 mg at bedtime; monitor for side effects including AM sedation, reassess daily.    Continue olanzapine (Zyprexa) 5 mg bid and as needed; monitor for side effects including sedation.    Seizure disorder stable, continue Keppra and Vimpat; monitor, no recurrent seizures reported of recent.    Reconsult psychiatry as needed.  Neurology consult appreciated.  Patient has been started on ropinirole for restless leg syndrome.     Bilateral pleural effusions   Acute respiratory failure requiring tracheostomy  - resolved    Pneumonia  - resolved   ARDS  - resolved    Right pneumothorax - resolved   *Initial event was parainfluenza and strep pneumo pneumonia back in November 2022. Treated for ARDS. Had failed extubation x2 and tracheostomy performed on previous hospitalization. *Had additional complications of bilateral pneumothoraces as well as hydropneumothorax- pleural fluid grew candida parapsilosis  *Completed 4-week antifungal treatment. While in [facility] he was successfully weaned from the ventilator.  *Recently there were concern for worsening effusion while at [facility] and had thoracentesis 01/13 which returned exudative without growth on cultures. CT chest/abdomen/pelvis on 01/18 demonstrated worsening effusions and concerns for infected parapneumonic effusions with  possible SBP.  Multiple pulmonologist at [facility] reviewed CT scan recommended transfer to [facility] for consideration of chest tube placement and possible intrapleural lysis  *Thoracic surgery (Dr. Jackson) consulted during admission   *CT chest 1/22, small effusions on imaging and so chest tube was not inserted.   ID consulted, see note 2/10.  *Patient pulled out his tracheostomy tube on the night 2/1-2/2 and is tolerating well without increased shortness of breath persistent cough or worsening hypoxia.  * Chest x-ray 2/3 with cardiomegaly, chronic small bilateral pleural effusions, no pulmonary edema; right internal jugular dialysis catheter in place.   3/12 Pulmonary consult, see note, concerns of hypercapnia, atelectasis, with consideration of BiPAP trial; no recommendations for antibiotics with infiltrates felt to be atelectasis.    Stable, continue to monitor respiratory status, recently stable on room air; occasional cough reported.    Encourage incentive spirometry as able, up to chair, deep breathing.    Wound care previously consulted for tracheostomy site care, monitor for signs and symptoms of infection, healing well - resolved.    Encourage ambulation.    Possible splenic infract  Wedge shaped area on CT scan chest and CT abdomen on 3/7/23.    Hematology consulted, suspicion for infarct was low.    TTE no thrombus or vegetations.    No abdominal pain CTM.     S/p liver transplant 2016   Chronic immunosuppression, on tacrolimus  Cirrhosis with ascites  Subacute bacterial peritonitis (SBP), resolved  *Main manifestations of this are hepatic encephalopathy and ascites.  Hepatologist at [facility] felt that most likely reason for the cirrhosis was metabolic syndrome or alcohol intake.  There was no evidence of rejection on biopsy and there was some evidence of regeneration. Paracentesis done on 12/22/2022 showed lactobacillus indicating SBP, treated with Unasyn and Augmentin, finished 2-week course 1/12/2023.  " Requires large-volume paracentesis periodically for recurring ascites.    *Paracentesis 1/13/2023 yielded about 7500 cc. Cultures NGTD. paracentesis on 1/24 with 4.8 L fluid removal  Paracentesis on 3/6/23 No SBP    Continue intermittent paracentesis as needed if develops symptomatic ascites.    Monitor for signs and symptoms of recurrent spontaneous bacterial peritonitis.    Further treatment for recurrent ascites with TIPS deferred to his Liver Transplant team and/or Hepatology, he has an appointment on 4/21/2023 with Hepatology, Dr. Simms.    Continue Tacrolimus 1 mg BID, rifaximin 550 mg BID.    Continue lactulose as ordered, titrate as needed to have 2-3 bms.    GI consult as needed in hospital for new acute issues, otherwise as outpatient.    Encourgae high protein diet.    CT abdomen on March 9, 2023 showed small to moderate ascites.    Goals of care   As per prior rounding provider, \"on 1/25 nursing had mentioned that patient had refused to undergo dialysis and want to stop care, palliative care was consulted.  Patient and his spouse denied wanting to stop care and wanted ongoing restorative care including full code\"    Monitor, Full Code status.    Needs placement to TCU.     Diarrhea, improved, on lactulose for chronic liver disease    C difficile negative on 1/31. Secondary to lactulose. Discontinued rectal tube (2/12) as stools more formed.    Continue lactulose with goal of 2 to 3 soft bowel movement in 24 hours.     Paroxysmal atrial fibrillation  Chronic anticoagulation, apixaban  History of embolic strokes  Remains in sinus rhythm, on anticoagulation with apixaban indefinitely for stroke prophylaxis.      Monitor rhythm.    Continue apixaban anticoagulation, it was briefly held for fistula placement.    Monitor hemoglobin, see below.     Acute anemia  Patient has required intermittent transfusions for on-going anemia.  Anemia most likely secondary to critical illness/chronic disease, chronic renal " disease.  Baseline hemoglobin around 7-8.  Likely Epo resistance.    Hemoglobin stable at 8.4 on 3/30/23.    Monitor intermittently.    Hypercalcemia  Josue hypercalcemia of Immobility    Nephrology following.    Hold VIT D.    Continue to hold Phoslo.    Monitor labs.    Encourage ambulation.    Nephrology considering prolia, however patient's wife would like to hold off for now.    History PEA arrest   PEA arrest prior to his previous admission and 1 episode of PEA associated with desaturation on 12/20.  No structural cardiac disease.     Stable, continue cardiac Monitoring.     Chronic Hypotension  In the past has developed baseline hypotension with cirrhosis and prolonged critical illness.  On hemodialysis.  Receiving midodrine and albumin during dialysis.    Continue midodrine, as per nephrology.      History of seizure   After hypoxic event, in the context of baseline multifactorial encephalopathy secondary to his ongoing critical illness and prior cardiac arrests and prior strokes and cirrhosis with hepatic encephalopathy. MRI of head of 12/20/22 reveals no PRES or leptomeningeal enhancement but scattered.  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   No recent seizure activity.    Continue levetiracetam, lacosamide.    Seizure precautions.    Outpatient follow-up with neurology, consult inpatient if needed.     ESRD  Anemia due to renal disease  Hypotension during dialysis  Hyperphosphatemia    As per nephrology.    Nephrology reviewing vein mapping to assess options for internal access placement for dialysis, see note 3/16.    Underwent Fistula placement on  3/21/22.    Vascular surgery following-continue to use for tunnel catheter.  Outpatient follow-up with vascular surgery and for will need a follow-up ultrasound of the fistula in 6 weeks to assess patency.    Started sevalmer 800mg TID with meals on 3/27/23.    Complained of cramping with dialysis.  Was given a 5 mg dose of oxycodone prior to dialysis on 3/31/23, patient did not find it helpful.     Diabetes mellitus type 2  *Hemoglobin A1c on November 2022 was 4.7  *By report, on Lantus insulin in the past.  Blood sugar checks and sliding scale insulin previously discontinued as has been stable without insulin needs.    Monitor with periodic blood sugar checks as needed.    Hypomagnesemia    Resolved with replacement.     Severe malnutrition, protein and calorie type  Moderate dysphagia  G-tube feedings    Continue with tube feeds via PEG tube.    IR replaced the PEG tube on 2/8/2023, monitor.    Nutritionist consulted and following for tube feeds.    SLP following as well. Oral diet as per speech and language therapy (SLP). Patient hopeful to advance to thin liquids soon.    2/20 Nutrition following for tube feeds.    Now undergoing PRN tube feeds.    Encourage high-protein diet.    Nutrition notes reviewed, patient eating 0 to 100% of meals.  Continue current interventions.  As needed tube feed boluses available if patient consumes less than 50% of the meal.    Family encouraging patient to eat high protein diet including protein bars.     Anxiety  Fluoxetine was discontinued as per psychiatry recommendation on 2/2 (see consult note for details).    Stable, monitor; comfort and reassurance offered during visit.    Psychiatry follow-up.    Restless leg  Syndrome    Patient started on ropiranole by neurology.       Diet: Room Service  Snacks/Supplements Adult: Other; Gelatein+ with brkfst and lunch, and magic cup with dinner (RD); With Meals  Combination Diet Regular Diet; Mildly Thick (level 2) (OK for fresh fruit (e.g., pineapple) per SLP)  Adult Formula Bolus Feeding: Novasource Renal; Route: Gastrostomy; 3 times daily; Volume per Bolus: 240; mL(s); Back up bolus for when pt consumes <50% of meals: 80 mL/hr x 3 hrs    DVT Prophylaxis: DOAC  Nixon Catheter: Not present  Lines: PRESENT      CVC Double Lumen  Right External jugular Tunneled-Site Assessment: WDL  Hemodialysis Vascular Access Arteriovenous fistula Left Forearm-Site Assessment: WDL      Cardiac Monitoring: None  Code Status: Full Code      Clinically Significant Risk Factors           # Hypercalcemia: Highest Ca = 11.2 mg/dL in last 2 days, will monitor as appropriate    # Hypoalbuminemia: Lowest albumin = 1.9 g/dL at 1/23/2023  6:38 AM, will monitor as appropriate            # Severe Malnutrition: based on nutrition assessment        Disposition Plan      Expected Discharge Date: 04/05/2023,  3:00 PM  Discharge Delays: Placement - LTC  Destination: inpatient rehabilitation facility  Discharge Comments: Dialysis pt MWF. Will need placement TCU/LTC. [facility] willing to accept once no sitter, SW to follow up when sitter free          Elvis Mills MD  Hospitalist Service  [facility]  Securely message with [employer] (more info)  Text page via [employer] Paging/Directory   ______________________________________________________________________    Interval History   Blanco Osborne was seen this morning. He feels OK. Tired. No new complaints/concerns. Denies pain, shortness of breath, nausea, abdominal pain. Doesn't feel like the oxycodone was very helpful with dialysis yesterday.    Physical Exam   Vital Signs: Temp: 97.8  F (36.6  C) Temp src: Oral BP: 97/64 Pulse: 79   Resp: 16 SpO2: 93 % O2 Device: None (Room air)    Weight: 180 lbs 15.96 oz    Constitutional: awake, alert, cooperative, no apparent distress, laying in the hospital bed  Respiratory: no increased work of breathing, clear to auscultation bilaterally, no crackles or wheezing  Cardiovascular: regular rate and rhythm, normal S1 and S2, no murmur noted  GI: normal bowel sounds, soft, non-distended, non-tender  Skin: warm, dry  Musculoskeletal: no lower extremity pitting edema present  Neurologic: awake, alert, appropriate in conversation, moves all extremities    Medical  Decision Making       30 MINUTES SPENT BY ME on the date of service doing chart review, history, exam, documentation & further activities per the note.      Data     I have personally reviewed the following data over the past 24 hrs:    N/A  \   N/A   / N/A     138 99 44.5 (H) /  113 (H)   4.6 25 4.60 (H) \       ALT: N/A AST: N/A AP: N/A TBILI: N/A   ALB: 3.4 (L) TOT PROTEIN: N/A LIPASE: N/A       Imaging results reviewed over the past 24 hrs:   No results found for this or any previous visit (from the past 24 hour(s)).

## 2023-04-02 LAB
ALBUMIN SERPL BCG-MCNC: 3.4 G/DL (ref 3.5–5.2)
ANION GAP SERPL CALCULATED.3IONS-SCNC: 15 MMOL/L (ref 7–15)
BUN SERPL-MCNC: 54 MG/DL (ref 6–20)
CALCIUM SERPL-MCNC: 10.9 MG/DL (ref 8.6–10)
CHLORIDE SERPL-SCNC: 96 MMOL/L (ref 98–107)
CREAT SERPL-MCNC: 5.28 MG/DL (ref 0.67–1.17)
DEPRECATED HCO3 PLAS-SCNC: 25 MMOL/L (ref 22–29)
GFR SERPL CREATININE-BSD FRML MDRD: 12 ML/MIN/1.73M2
GLUCOSE SERPL-MCNC: 83 MG/DL (ref 70–99)
PHOSPHATE SERPL-MCNC: 7.7 MG/DL (ref 2.5–4.5)
POTASSIUM SERPL-SCNC: 4.7 MMOL/L (ref 3.4–5.3)
SODIUM SERPL-SCNC: 136 MMOL/L (ref 136–145)

## 2023-04-02 PROCEDURE — 80069 RENAL FUNCTION PANEL: CPT | Performed by: STUDENT IN AN ORGANIZED HEALTH CARE EDUCATION/TRAINING PROGRAM

## 2023-04-02 PROCEDURE — 250N000013 HC RX MED GY IP 250 OP 250 PS 637: Performed by: STUDENT IN AN ORGANIZED HEALTH CARE EDUCATION/TRAINING PROGRAM

## 2023-04-02 PROCEDURE — 250N000013 HC RX MED GY IP 250 OP 250 PS 637: Performed by: HOSPITALIST

## 2023-04-02 PROCEDURE — 99231 SBSQ HOSP IP/OBS SF/LOW 25: CPT | Performed by: HOSPITALIST

## 2023-04-02 PROCEDURE — 250N000012 HC RX MED GY IP 250 OP 636 PS 637: Performed by: STUDENT IN AN ORGANIZED HEALTH CARE EDUCATION/TRAINING PROGRAM

## 2023-04-02 PROCEDURE — 120N000001 HC R&B MED SURG/OB

## 2023-04-02 PROCEDURE — 36415 COLL VENOUS BLD VENIPUNCTURE: CPT | Performed by: STUDENT IN AN ORGANIZED HEALTH CARE EDUCATION/TRAINING PROGRAM

## 2023-04-02 PROCEDURE — 250N000013 HC RX MED GY IP 250 OP 250 PS 637: Performed by: PSYCHIATRY & NEUROLOGY

## 2023-04-02 RX ORDER — OLANZAPINE 5 MG/1
5 TABLET, ORALLY DISINTEGRATING ORAL
Status: DISCONTINUED | OUTPATIENT
Start: 2023-04-03 | End: 2023-04-18

## 2023-04-02 RX ORDER — OLANZAPINE 5 MG/1
5 TABLET, ORALLY DISINTEGRATING ORAL AT BEDTIME
Status: DISCONTINUED | OUTPATIENT
Start: 2023-04-02 | End: 2023-04-17

## 2023-04-02 RX ADMIN — RAMELTEON 8 MG: 8 TABLET, FILM COATED ORAL at 21:09

## 2023-04-02 RX ADMIN — TACROLIMUS 1 MG: 1 CAPSULE ORAL at 08:21

## 2023-04-02 RX ADMIN — TACROLIMUS 1 MG: 1 CAPSULE ORAL at 21:09

## 2023-04-02 RX ADMIN — SEVELAMER CARBONATE 800 MG: 800 TABLET, FILM COATED ORAL at 18:19

## 2023-04-02 RX ADMIN — WHITE PETROLATUM: 1.75 OINTMENT TOPICAL at 08:26

## 2023-04-02 RX ADMIN — FOLIC ACID 1 MG: 1 TABLET ORAL at 08:21

## 2023-04-02 RX ADMIN — SEVELAMER CARBONATE 800 MG: 800 TABLET, FILM COATED ORAL at 12:18

## 2023-04-02 RX ADMIN — APIXABAN 5 MG: 5 TABLET, FILM COATED ORAL at 08:21

## 2023-04-02 RX ADMIN — LACOSAMIDE 100 MG: 100 TABLET, FILM COATED ORAL at 23:22

## 2023-04-02 RX ADMIN — PANTOPRAZOLE SODIUM 40 MG: 40 TABLET, DELAYED RELEASE ORAL at 08:21

## 2023-04-02 RX ADMIN — OLANZAPINE 5 MG: 5 TABLET, ORALLY DISINTEGRATING ORAL at 21:10

## 2023-04-02 RX ADMIN — ACETAMINOPHEN 650 MG: 325 TABLET, FILM COATED ORAL at 21:09

## 2023-04-02 RX ADMIN — LACOSAMIDE 100 MG: 100 TABLET, FILM COATED ORAL at 12:18

## 2023-04-02 RX ADMIN — LACTULOSE 10 G: 10 SOLUTION ORAL at 21:10

## 2023-04-02 RX ADMIN — OLANZAPINE 5 MG: 5 TABLET, ORALLY DISINTEGRATING ORAL at 08:20

## 2023-04-02 RX ADMIN — RIFAXIMIN 550 MG: 550 TABLET ORAL at 21:09

## 2023-04-02 RX ADMIN — Medication 1 CAPSULE: at 08:21

## 2023-04-02 RX ADMIN — RIFAXIMIN 550 MG: 550 TABLET ORAL at 08:20

## 2023-04-02 RX ADMIN — GUAIFENESIN 600 MG: 600 TABLET, EXTENDED RELEASE ORAL at 21:09

## 2023-04-02 RX ADMIN — APIXABAN 5 MG: 5 TABLET, FILM COATED ORAL at 21:10

## 2023-04-02 RX ADMIN — MIDODRINE HYDROCHLORIDE 10 MG: 5 TABLET ORAL at 12:18

## 2023-04-02 RX ADMIN — SEVELAMER CARBONATE 800 MG: 800 TABLET, FILM COATED ORAL at 08:21

## 2023-04-02 RX ADMIN — LEVETIRACETAM 1000 MG: 500 TABLET, FILM COATED ORAL at 21:09

## 2023-04-02 RX ADMIN — MIDODRINE HYDROCHLORIDE 10 MG: 5 TABLET ORAL at 08:21

## 2023-04-02 RX ADMIN — ACETAMINOPHEN 650 MG: 325 TABLET, FILM COATED ORAL at 15:57

## 2023-04-02 RX ADMIN — MIDODRINE HYDROCHLORIDE 10 MG: 5 TABLET ORAL at 18:19

## 2023-04-02 RX ADMIN — GUAIFENESIN 600 MG: 600 TABLET, EXTENDED RELEASE ORAL at 08:21

## 2023-04-02 RX ADMIN — ROPINIROLE HYDROCHLORIDE 0.5 MG: 0.25 TABLET, FILM COATED ORAL at 21:09

## 2023-04-02 ASSESSMENT — ACTIVITIES OF DAILY LIVING (ADL)
ADLS_ACUITY_SCORE: 56

## 2023-04-02 NOTE — PLAN OF CARE
Goal Outcome Evaluation:    DATE & TIME: 4/2/23 0700-1930  Cognitive Concerns/ Orientation: A&O x2-3  Disoriented to time and situation with confusion at times   BEHAVIOR & AGGRESSION TOOL COLOR: Green         ABNL VS/O2: VSS on RA  MOBILITY: Assist x 1 gait belt/walker. 10 lb weight bear limit on L arm d/t new fistula.    PAIN MANAGMENT: PRN Tylenol x 1  DIET: Regular diet, mildly thickened liquids (level 2); takes pills without difficulty. Bolus feeding available if pt eats <50% of meals.  BOWEL/BLADDER: BM x 2 before my shift, then 2 small BM from [id number]. Held morning lactulose Anuric (dialysis pt MWF).  ABNL LAB/BG: Cr 5.28, BUN 54, Phos 7.7, Calcium 10.9   DRAIN/DEVICES: R chest CVC, double lumen for hemodialysis; PEG tube clamped; L fistula placed 3/21/23 with strong bruit/thrill present. Sutures open to air and CDI, no redness or swelling, no pain at site per pt report.  SKIN: Scattered bruising; L forearm skin tear covered; L fistula site with sutures intact   PROCEDURES: Dialysis MWF  D/C DATE: Pending placement to TCU.  OTHER IMPORTANT INFO: Resting in bed most of day, didn't sleep much overnight. Sitter remains at bedside for confusion.

## 2023-04-02 NOTE — PLAN OF CARE
Goal Outcome Evaluation:    DATE & TIME: 4/1/23 at [id number]  Cognitive Concerns/ Orientation: A&O x2-3  Disoriented to time and situation with confusion at times   BEHAVIOR & AGGRESSION TOOL COLOR: Green         ABNL VS/O2: VSS on RA  MOBILITY: Assist x 1 gait belt/walker. 10 lb weight bear limit on L arm d/t new fistula.    PAIN MANAGMENT: Chronic back pain. Placed PRN icy hot patch on back and rotating with heat. Pt had PRN Tylenol and melatonin at bedtime.   DIET: Regular diet, mildly thickened liquids (level 2); PRN takes pills without difficulty. Bolus feeding available if pt eats <50% of meals.  BOWEL/BLADDER: BM x 2 before midnight (on my shift), then 2 small BM after midnight. Anuric (dialysis pt MWF). Continent.  ABNL LAB/BG: Cr 4.60, BUN 44.5, Phos 5.8, Calcium 10.9   DRAIN/DEVICES: R chest CVC, double lumen for hemodialysis; PEG tube clamped; L fistula placed 3/21/23 with strong bruit/thrill present. Sutures open to air and CDI, no redness or swelling, no pain at site per pt report.  SKIN: Mild jaundice/pale; Scattered bruising; L forearm skin tear covered; L fistula site with sutures intact   PROCEDURES: Dialysis MWF  D/C DATE: Pending placement to TCU.  OTHER IMPORTANT INFO: Resting between cares, did not sleep much overnight despite the PRN melatonin. Sitter remains at bedside for confusion.

## 2023-04-02 NOTE — PROGRESS NOTES
[facility]    Medicine Progress Note - Hospitalist Service    Date of Admission:  1/21/2023    Assessment & Plan   Blanco Osborne is a 57 year-old male admitted on 1/21/2023 as a transfer from [facility] for evaluation of loculated pleural effusion. He has extensive PMH including h/o liver transplant 2016 due to alcohol abuse, pAF on chronic anticoagulation, history of diabetes mellitus type 2, CVA, hypertension, CHRISTIAN on BiPAP.  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 He was recovering in [facility] and was transferred to [facility] for consideration of chest tube placement and possible intrapleural lysis for worsening effusion.     Acute metabolic encephalopathy with delirium, multifactorial, slowly improving?  Hepatic encephalopathy  Chronic liver disease, history of liver transplant 2016  End-stage renal disease (ESRD), on hemodialysis (HD)  History of seizure, on Keppra and Vimpat  Waxing and waning mentation, coinciding with lactulose being held at [facility]  Earlier in hospital stay, remained confused and had pulled out tracheostomy tube, PICC line and pulled his dialysis catheter.  Lines replaced.  Psychiatry consulted, recent follow-up 2/21, 2/28, see notes.    Mental status continues to fluctuate with periods of alertness and increased sleepiness, overall improving.    Complicated, prolonged hospital course with mental status concerns multifactorial related to hepatic encephalopathy, end-stage renal disease on dialysis, past PEA arrest, seizure disorder, medication, and chronic liver disease with prior liver transplant.  As mental status fluctuates will continue to require one-to-one sitter as needed for added safety and supervision.  Reassess daily.    Continue to reorient and  redirect as able.  Avoid restraints if possible with goals of safety and close supervision (given multiple lines and tubes - dialysis catheter, PEG tube, IV, etc).    Maintain normal day/night cycles and sleep-wake cycles.  Minimize sedating medications as able.  Remains weak and deconditioned with complex, prolonged hospital course and limited mobility.  Encouragement and support offered daily to patient.    Continue Lactulose to 10 mg BID, monitor for symptoms; goal to have 2-3 bowel movements per day.    Ongoing hemodialysis, as per nephrology, 3X/week dialysis schedule.    Nephrology consult follow-up appreciated, dialysis patient.    Continue ramelteon 8 mg at bedtime; monitor for side effects including AM sedation, reassess daily.    Started on olanzapine 5 mg BID and PRN earlier during this hospitalization. Delirium improved, will discontinue AM dose of olanzapine on non-dialysis days. Will continue AM dose on dialysis days as his spouse is concerned that he will get restless during dialysis without it.    Seizure disorder stable, continue Keppra and Vimpat; monitor, no recurrent seizures reported of recent.    Reconsult psychiatry as needed.  Neurology consult appreciated.  Patient has been started on ropinirole for restless leg syndrome.     Bilateral pleural effusions   Acute respiratory failure requiring tracheostomy  - resolved    Pneumonia  - resolved   ARDS  - resolved    Right pneumothorax - resolved   *Initial event was parainfluenza and strep pneumo pneumonia back in November 2022. Treated for ARDS. Had failed extubation x2 and tracheostomy performed on previous hospitalization. *Had additional complications of bilateral pneumothoraces as well as hydropneumothorax- pleural fluid grew candida parapsilosis  *Completed 4-week antifungal treatment. While in [facility]ACH he was successfully weaned from the ventilator.  *Recently there were concern for worsening effusion while at LTACH and had  thoracentesis 01/13 which returned exudative without growth on cultures. CT chest/abdomen/pelvis on 01/18 demonstrated worsening effusions and concerns for infected parapneumonic effusions with possible SBP.  Multiple pulmonologist at [facility] reviewed CT scan recommended transfer to [facility] for consideration of chest tube placement and possible intrapleural lysis  *Thoracic surgery (Dr. Jackson) consulted during admission   *CT chest 1/22, small effusions on imaging and so chest tube was not inserted.   ID consulted, see note 2/10.  *Patient pulled out his tracheostomy tube on the night 2/1-2/2 and is tolerating well without increased shortness of breath persistent cough or worsening hypoxia.  * Chest x-ray 2/3 with cardiomegaly, chronic small bilateral pleural effusions, no pulmonary edema; right internal jugular dialysis catheter in place.   3/12 Pulmonary consult, see note, concerns of hypercapnia, atelectasis, with consideration of BiPAP trial; no recommendations for antibiotics with infiltrates felt to be atelectasis.    Stable, continue to monitor respiratory status, recently stable on room air; occasional cough reported.    Encourage incentive spirometry as able, up to chair, deep breathing.    Wound care previously consulted for tracheostomy site care, monitor for signs and symptoms of infection, healing well - resolved.    Encourage ambulation.    Possible splenic infract  Wedge shaped area on CT scan chest and CT abdomen on 3/7/23.    Hematology consulted, suspicion for infarct was low.    TTE no thrombus or vegetations.    No abdominal pain CTM.     S/p liver transplant 2016   Chronic immunosuppression, on tacrolimus  Cirrhosis with ascites  Subacute bacterial peritonitis (SBP), resolved  *Main manifestations of this are hepatic encephalopathy and ascites.  Hepatologist at [place] felt that most likely reason for the cirrhosis was metabolic syndrome or alcohol intake.  There was no evidence of  "rejection on biopsy and there was some evidence of regeneration. Paracentesis done on 12/22/2022 showed lactobacillus indicating SBP, treated with Unasyn and Augmentin, finished 2-week course 1/12/2023.  Requires large-volume paracentesis periodically for recurring ascites.    *Paracentesis 1/13/2023 yielded about 7500 cc. Cultures NGTD. paracentesis on 1/24 with 4.8 L fluid removal  Paracentesis on 3/6/23 No SBP    Continue intermittent paracentesis as needed if develops symptomatic ascites.    Monitor for signs and symptoms of recurrent spontaneous bacterial peritonitis.    Further treatment for recurrent ascites with TIPS deferred to his Liver Transplant team and/or Hepatology, he has an appointment on 4/21/2023 with Hepatology, Dr. Simms.    Continue Tacrolimus 1 mg BID, rifaximin 550 mg BID.    Continue lactulose as ordered, titrate as needed to have 2-3 bms.    GI consult as needed in hospital for new acute issues, otherwise as outpatient.    Encourgae high protein diet.    CT abdomen on March 9, 2023 showed small to moderate ascites.    Goals of care   As per prior rounding provider, \"on 1/25 nursing had mentioned that patient had refused to undergo dialysis and want to stop care, palliative care was consulted.  Patient and his spouse denied wanting to stop care and wanted ongoing restorative care including full code\"    Monitor, Full Code status.    Needs placement to TCU.     Diarrhea, improved, on lactulose for chronic liver disease    C difficile negative on 1/31. Secondary to lactulose. Discontinued rectal tube (2/12) as stools more formed.    Continue lactulose with goal of 2 to 3 soft bowel movement in 24 hours.     Paroxysmal atrial fibrillation  Chronic anticoagulation, apixaban  History of embolic strokes  Remains in sinus rhythm, on anticoagulation with apixaban indefinitely for stroke prophylaxis.      Monitor rhythm.    Continue apixaban anticoagulation, it was briefly held for fistula " placement.    Monitor hemoglobin, see below.     Acute anemia  Patient has required intermittent transfusions for on-going anemia.  Anemia most likely secondary to critical illness/chronic disease, chronic renal disease.  Baseline hemoglobin around 7-8.  Likely Epo resistance.    Hemoglobin stable at 8.4 on 3/30/23.    Monitor intermittently.    Hypercalcemia  Jouse hypercalcemia of Immobility    Nephrology following.    Hold VIT D.    Continue to hold Phoslo.    Monitor labs.    Encourage ambulation.    Nephrology considering prolia, however patient's wife would like to hold off for now.    History PEA arrest   PEA arrest prior to his previous admission and 1 episode of PEA associated with desaturation on 12/20.  No structural cardiac disease.     Stable, continue cardiac Monitoring.     Chronic Hypotension  In the past has developed baseline hypotension with cirrhosis and prolonged critical illness.  On hemodialysis.  Receiving midodrine and albumin during dialysis.    Continue midodrine, as per nephrology.      History of seizure   After hypoxic event, in the context of baseline multifactorial encephalopathy secondary to his ongoing critical illness and prior cardiac arrests and prior strokes and cirrhosis with hepatic encephalopathy. MRI of head of 12/20/22 reveals no PRES or leptomeningeal enhancement but scattered.  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   No recent seizure activity.    Continue levetiracetam, lacosamide.    Seizure precautions.    Outpatient follow-up with neurology, consult inpatient if needed.     ESRD  Anemia due to renal disease  Hypotension during dialysis  Hyperphosphatemia    As per nephrology.    Nephrology reviewing vein mapping to assess options for internal access placement for dialysis, see note 3/16.    Underwent Fistula placement on  3/21/22.    Vascular surgery following-continue to use for tunnel catheter.  Outpatient  follow-up with vascular surgery and for will need a follow-up ultrasound of the fistula in 6 weeks to assess patency.    Started sevalmer 800mg TID with meals on 3/27/23.    Complained of cramping with dialysis. Tried a dose of oxycodone prior to dialysis but this was not helpful. He will discuss the cramping with nephrology.     Diabetes mellitus type 2  *Hemoglobin A1c on November 2022 was 4.7  *By report, on Lantus insulin in the past.  Blood sugar checks and sliding scale insulin previously discontinued as has been stable without insulin needs.    Monitor with periodic blood sugar checks as needed.    Hypomagnesemia    Resolved with replacement.     Severe malnutrition, protein and calorie type  Moderate dysphagia  G-tube feedings    Continue with tube feeds via PEG tube.    IR replaced the PEG tube on 2/8/2023, monitor.    Nutritionist consulted and following for tube feeds.    SLP following as well. Oral diet as per speech and language therapy (SLP). Patient hopeful to advance to thin liquids soon.    2/20 Nutrition following for tube feeds.    Now undergoing PRN tube feeds.    Encourage high-protein diet.    Nutrition notes reviewed, patient eating 0 to 100% of meals.  Continue current interventions.  As needed tube feed boluses available if patient consumes less than 50% of the meal.    Family encouraging patient to eat high protein diet including protein bars.     Anxiety  Fluoxetine was discontinued as per psychiatry recommendation on 2/2 (see consult note for details).    Stable, monitor; comfort and reassurance offered during visit.    Psychiatry follow-up.    Restless leg  Syndrome    Patient started on ropiranole by neurology.       Diet: Room Service  Snacks/Supplements Adult: Other; Gelatein+ with brkfst and lunch, and magic cup with dinner (RD); With Meals  Combination Diet Regular Diet; Mildly Thick (level 2) (OK for fresh fruit (e.g., pineapple) per SLP)  Adult Formula Bolus Feeding: Novasource  Renal; Route: Gastrostomy; 3 times daily; Volume per Bolus: 240; mL(s); Back up bolus for when pt consumes <50% of meals: 80 mL/hr x 3 hrs    DVT Prophylaxis: DOAC  Nixon Catheter: Not present  Lines: PRESENT      CVC Double Lumen Right External jugular Tunneled-Site Assessment: WDL  Hemodialysis Vascular Access Arteriovenous fistula Left Forearm-Site Assessment: WDL      Cardiac Monitoring: None  Code Status: Full Code      Clinically Significant Risk Factors           # Hypercalcemia: Highest Ca = 10.9 mg/dL in last 2 days, will monitor as appropriate    # Hypoalbuminemia: Lowest albumin = 1.9 g/dL at 1/23/2023  6:38 AM, will monitor as appropriate            # Severe Malnutrition: based on nutrition assessment        Disposition Plan      Expected Discharge Date: 04/05/2023, 12:00 PM  Discharge Delays: Placement - LTC  Destination: inpatient rehabilitation facility  Discharge Comments: Dialysis pt MWF. Will need placement TCU/LTC. [facility] willing to accept once no sitter, SW to follow up when sitter free          Elvis Mills MD  Hospitalist Service  [facility]  Securely message with [employer] (more info)  Text page via [employer] Paging/Directory   ______________________________________________________________________    Interval History   Blanco Osborne was seen this morning. He feels OK. His wife was in the room with him. Discussed plan of care. Oxycodone didn't help with cramps during dialysis on Friday, will not give any further oxycodone. He will discuss the cramps with nephrology.    Physical Exam   Vital Signs: Temp: 97.2  F (36.2  C) Temp src: Axillary BP: 99/63 Pulse: 75   Resp: 18 SpO2: 95 % O2 Device: None (Room air)    Weight: 180 lbs 15.96 oz    Constitutional: awake, alert, cooperative, no apparent distress, laying in the hospital bed  Respiratory: no increased work of breathing, clear to auscultation bilaterally, no crackles or wheezing  Cardiovascular: regular rate and  rhythm, normal S1 and S2, no murmur noted  GI: normal bowel sounds, soft, non-distended, non-tender  Skin: warm, dry  Musculoskeletal: no lower extremity pitting edema present  Neurologic: awake, alert, appropriate in conversation, moves all extremities    Medical Decision Making       30 MINUTES SPENT BY ME on the date of service doing chart review, history, exam, documentation & further activities per the note.      Data     I have personally reviewed the following data over the past 24 hrs:    N/A  \   N/A   / N/A     136 96 (L) 54.0 (H) /  83   4.7 25 5.28 (H) \       ALT: N/A AST: N/A AP: N/A TBILI: N/A   ALB: 3.4 (L) TOT PROTEIN: N/A LIPASE: N/A

## 2023-04-02 NOTE — PROGRESS NOTES
Brief Nephrology Note     Chart and labs reviewed. Calcium significantly improving as patient increasing his physical activity.     Last Comprehensive Metabolic Panel:  Lab Results   Component Value Date     04/02/2023    POTASSIUM 4.7 04/02/2023    CHLORIDE 96 (L) 04/02/2023    CO2 25 04/02/2023    ANIONGAP 15 04/02/2023    GLC 83 04/02/2023    BUN 54.0 (H) 04/02/2023    CR 5.28 (H) 04/02/2023    GFRESTIMATED 12 (L) 04/02/2023    KELLY 10.9 (H) 04/02/2023       - HD tomorrow   - epo with dialysis     Dwayne Laboy MD

## 2023-04-03 LAB
ALBUMIN SERPL BCG-MCNC: 3.7 G/DL (ref 3.5–5.2)
ANION GAP SERPL CALCULATED.3IONS-SCNC: 13 MMOL/L (ref 7–15)
BUN SERPL-MCNC: 32.6 MG/DL (ref 6–20)
CALCIUM SERPL-MCNC: 9.8 MG/DL (ref 8.6–10)
CHLORIDE SERPL-SCNC: 96 MMOL/L (ref 98–107)
CREAT SERPL-MCNC: 3.51 MG/DL (ref 0.67–1.17)
DEPRECATED HCO3 PLAS-SCNC: 26 MMOL/L (ref 22–29)
ERYTHROCYTE [DISTWIDTH] IN BLOOD BY AUTOMATED COUNT: 16.2 % (ref 10–15)
GFR SERPL CREATININE-BSD FRML MDRD: 19 ML/MIN/1.73M2
GLUCOSE SERPL-MCNC: 99 MG/DL (ref 70–99)
HCT VFR BLD AUTO: 31.8 % (ref 40–53)
HGB BLD-MCNC: 9.9 G/DL (ref 13.3–17.7)
MAGNESIUM SERPL-MCNC: 1.8 MG/DL (ref 1.7–2.3)
MCH RBC QN AUTO: 30 PG (ref 26.5–33)
MCHC RBC AUTO-ENTMCNC: 31.1 G/DL (ref 31.5–36.5)
MCV RBC AUTO: 96 FL (ref 78–100)
PHOSPHATE SERPL-MCNC: 4 MG/DL (ref 2.5–4.5)
PLATELET # BLD AUTO: 118 10E3/UL (ref 150–450)
POTASSIUM SERPL-SCNC: 3.4 MMOL/L (ref 3.4–5.3)
RBC # BLD AUTO: 3.3 10E6/UL (ref 4.4–5.9)
SODIUM SERPL-SCNC: 135 MMOL/L (ref 136–145)
WBC # BLD AUTO: 3.3 10E3/UL (ref 4–11)

## 2023-04-03 PROCEDURE — 250N000013 HC RX MED GY IP 250 OP 250 PS 637: Performed by: STUDENT IN AN ORGANIZED HEALTH CARE EDUCATION/TRAINING PROGRAM

## 2023-04-03 PROCEDURE — 120N000001 HC R&B MED SURG/OB

## 2023-04-03 PROCEDURE — 250N000013 HC RX MED GY IP 250 OP 250 PS 637: Performed by: PSYCHIATRY & NEUROLOGY

## 2023-04-03 PROCEDURE — 90937 HEMODIALYSIS REPEATED EVAL: CPT

## 2023-04-03 PROCEDURE — 36415 COLL VENOUS BLD VENIPUNCTURE: CPT | Performed by: STUDENT IN AN ORGANIZED HEALTH CARE EDUCATION/TRAINING PROGRAM

## 2023-04-03 PROCEDURE — 250N000012 HC RX MED GY IP 250 OP 636 PS 637: Performed by: STUDENT IN AN ORGANIZED HEALTH CARE EDUCATION/TRAINING PROGRAM

## 2023-04-03 PROCEDURE — 258N000003 HC RX IP 258 OP 636: Performed by: INTERNAL MEDICINE

## 2023-04-03 PROCEDURE — 250N000013 HC RX MED GY IP 250 OP 250 PS 637: Performed by: HOSPITALIST

## 2023-04-03 PROCEDURE — 85014 HEMATOCRIT: CPT | Performed by: HOSPITALIST

## 2023-04-03 PROCEDURE — 634N000001 HC RX 634: Performed by: STUDENT IN AN ORGANIZED HEALTH CARE EDUCATION/TRAINING PROGRAM

## 2023-04-03 PROCEDURE — 90935 HEMODIALYSIS ONE EVALUATION: CPT | Performed by: INTERNAL MEDICINE

## 2023-04-03 PROCEDURE — 250N000011 HC RX IP 250 OP 636: Performed by: INTERNAL MEDICINE

## 2023-04-03 PROCEDURE — 99233 SBSQ HOSP IP/OBS HIGH 50: CPT | Performed by: STUDENT IN AN ORGANIZED HEALTH CARE EDUCATION/TRAINING PROGRAM

## 2023-04-03 PROCEDURE — 82040 ASSAY OF SERUM ALBUMIN: CPT | Performed by: STUDENT IN AN ORGANIZED HEALTH CARE EDUCATION/TRAINING PROGRAM

## 2023-04-03 PROCEDURE — 83735 ASSAY OF MAGNESIUM: CPT | Performed by: STUDENT IN AN ORGANIZED HEALTH CARE EDUCATION/TRAINING PROGRAM

## 2023-04-03 RX ORDER — CYANOCOBALAMIN 1000 UG/ML
1000 INJECTION, SOLUTION INTRAMUSCULAR; SUBCUTANEOUS ONCE
Status: COMPLETED | OUTPATIENT
Start: 2023-04-03 | End: 2023-04-03

## 2023-04-03 RX ADMIN — MIDODRINE HYDROCHLORIDE 10 MG: 5 TABLET ORAL at 07:32

## 2023-04-03 RX ADMIN — LACTULOSE 10 G: 10 SOLUTION ORAL at 11:53

## 2023-04-03 RX ADMIN — CYANOCOBALAMIN 1000 MCG: 1000 INJECTION, SOLUTION INTRAMUSCULAR at 13:57

## 2023-04-03 RX ADMIN — TACROLIMUS 1 MG: 1 CAPSULE ORAL at 22:03

## 2023-04-03 RX ADMIN — ACETAMINOPHEN 650 MG: 325 TABLET, FILM COATED ORAL at 07:38

## 2023-04-03 RX ADMIN — ROPINIROLE HYDROCHLORIDE 0.5 MG: 0.25 TABLET, FILM COATED ORAL at 22:03

## 2023-04-03 RX ADMIN — RAMELTEON 8 MG: 8 TABLET, FILM COATED ORAL at 22:03

## 2023-04-03 RX ADMIN — LACOSAMIDE 100 MG: 100 TABLET, FILM COATED ORAL at 11:52

## 2023-04-03 RX ADMIN — EPOETIN ALFA-EPBX 20000 UNITS: 10000 INJECTION, SOLUTION INTRAVENOUS; SUBCUTANEOUS at 10:50

## 2023-04-03 RX ADMIN — PANTOPRAZOLE SODIUM 40 MG: 40 TABLET, DELAYED RELEASE ORAL at 07:45

## 2023-04-03 RX ADMIN — Medication 1 CAPSULE: at 11:51

## 2023-04-03 RX ADMIN — APIXABAN 5 MG: 5 TABLET, FILM COATED ORAL at 22:03

## 2023-04-03 RX ADMIN — IRON SUCROSE 200 MG: 20 INJECTION, SOLUTION INTRAVENOUS at 14:05

## 2023-04-03 RX ADMIN — OLANZAPINE 5 MG: 5 TABLET, ORALLY DISINTEGRATING ORAL at 07:41

## 2023-04-03 RX ADMIN — SEVELAMER CARBONATE 800 MG: 800 TABLET, FILM COATED ORAL at 11:52

## 2023-04-03 RX ADMIN — ACETAMINOPHEN 650 MG: 325 TABLET, FILM COATED ORAL at 11:53

## 2023-04-03 RX ADMIN — FOLIC ACID 1 MG: 1 TABLET ORAL at 12:02

## 2023-04-03 RX ADMIN — MIDODRINE HYDROCHLORIDE 10 MG: 5 TABLET ORAL at 17:42

## 2023-04-03 RX ADMIN — GUAIFENESIN 600 MG: 600 TABLET, EXTENDED RELEASE ORAL at 22:03

## 2023-04-03 RX ADMIN — TACROLIMUS 1 MG: 1 CAPSULE ORAL at 07:45

## 2023-04-03 RX ADMIN — OLANZAPINE 5 MG: 5 TABLET, ORALLY DISINTEGRATING ORAL at 22:03

## 2023-04-03 RX ADMIN — LACTULOSE 10 G: 10 SOLUTION ORAL at 22:03

## 2023-04-03 RX ADMIN — RIFAXIMIN 550 MG: 550 TABLET ORAL at 22:03

## 2023-04-03 RX ADMIN — ACETAMINOPHEN 650 MG: 325 TABLET, FILM COATED ORAL at 11:54

## 2023-04-03 RX ADMIN — RIFAXIMIN 550 MG: 550 TABLET ORAL at 07:31

## 2023-04-03 RX ADMIN — APIXABAN 5 MG: 5 TABLET, FILM COATED ORAL at 07:32

## 2023-04-03 RX ADMIN — GUAIFENESIN 600 MG: 600 TABLET, EXTENDED RELEASE ORAL at 11:52

## 2023-04-03 RX ADMIN — ACETAMINOPHEN 650 MG: 325 TABLET, FILM COATED ORAL at 17:53

## 2023-04-03 RX ADMIN — MIDODRINE HYDROCHLORIDE 10 MG: 5 TABLET ORAL at 11:52

## 2023-04-03 RX ADMIN — SEVELAMER CARBONATE 800 MG: 800 TABLET, FILM COATED ORAL at 17:42

## 2023-04-03 RX ADMIN — LEVETIRACETAM 1000 MG: 500 TABLET, FILM COATED ORAL at 22:03

## 2023-04-03 ASSESSMENT — ACTIVITIES OF DAILY LIVING (ADL)
ADLS_ACUITY_SCORE: 56
ADLS_ACUITY_SCORE: 53
ADLS_ACUITY_SCORE: 56
ADLS_ACUITY_SCORE: 56
ADLS_ACUITY_SCORE: 57
ADLS_ACUITY_SCORE: 56
ADLS_ACUITY_SCORE: 54
ADLS_ACUITY_SCORE: 56
ADLS_ACUITY_SCORE: 56
ADLS_ACUITY_SCORE: 55
ADLS_ACUITY_SCORE: 56
ADLS_ACUITY_SCORE: 56

## 2023-04-03 NOTE — PLAN OF CARE
Goal Outcome Evaluation:                      DATE & TIME: 4/3/23 0730-19  Cognitive Concerns/ Orientation: A&O x2-3  Disoriented to time and situation with confusion at times   BEHAVIOR & AGGRESSION TOOL COLOR: Green         ABNL VS/O2: VSS on RA  MOBILITY: Assist x 1 gait belt/walker. 10 lb weight bear limit on L arm d/t new fistula.    PAIN MANAGMENT: PRN Tylenol x 2 for low back pain  DIET: Regular diet, mildly thickened liquids (level 2); takes pills without difficulty. Bolus feeding available if pt eats <50% of meals. Pt refused tube feedings-MD aware  BOWEL/BLADDER: Continent of Bowel, Anuric (dialysis pt MWF).  ABNL LAB/BG: Cr 3.51, wbc 3.3  DRAIN/DEVICES: R chest CVC, double lumen for hemodialysis; RPIV;PEG tube clamped; L fistula placed 3/21/23 with strong bruit/thrill present. Sutures open to air and CDI, no redness or swelling, no pain at site per pt report.  SKIN: Scattered bruising; L forearm skin tear covered; L fistula site with sutures intact   PROCEDURES: Dialysis MWF  D/C DATE: Pending placement to TCU.  OTHER IMPORTANT INFO:dialysis this am,tolerated well. Ambulated short distance in [place] with 1/walker/belt. Sitter remains at bedside for confusion.

## 2023-04-03 NOTE — PLAN OF CARE
DATE & TIME: 4/2/23 1900-0730  Cognitive Concerns/ Orientation: A&O x2-3  Disoriented to time and situation with confusion at times   BEHAVIOR & AGGRESSION TOOL COLOR: Green         ABNL VS/O2: VSS on RA  MOBILITY: Assist x 1 gait belt/walker. 10 lb weight bear limit on L arm d/t new fistula.    PAIN MANAGMENT: PRN Tylenol x 1  DIET: Regular diet, mildly thickened liquids (level 2); takes pills without difficulty. Bolus feeding available if pt eats <50% of meals.  BOWEL/BLADDER: Continent of Bowel, small x1 on eves, bedtime lactulose given. Anuric (dialysis pt MWF).  ABNL LAB/BG: Cr 5.28, BUN 54, Phos 7.7, Calcium 10.9   DRAIN/DEVICES: R chest CVC, double lumen for hemodialysis; PEG tube clamped; L fistula placed 3/21/23 with strong bruit/thrill present. Sutures open to air and CDI, no redness or swelling, no pain at site per pt report.  SKIN: Scattered bruising; L forearm skin tear covered; L fistula site with sutures intact   PROCEDURES: Dialysis MWF  D/C DATE: Pending placement to TCU.  OTHER IMPORTANT INFO: Resting in bed most of day, didn't sleep much overnight. Sitter remains at bedside for confusion.

## 2023-04-03 NOTE — PROGRESS NOTES
[facility]    Medicine Progress Note - Hospitalist Service    Date of Admission:  1/21/2023  Date of Service: 04/03/2023    Assessment & Plan   Blanco Osborne is a 57 year-old male admitted on 1/21/2023 as a transfer from [facility] for evaluation of loculated pleural effusion. He has extensive PMH including h/o liver transplant 2016 due to alcohol abuse, pAF on chronic anticoagulation, history of diabetes mellitus type 2, CVA, hypertension, CHRISTIAN on BiPAP.  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 He was recovering in [facility] and was transferred to [facility] for consideration of chest tube placement and possible intrapleural lysis for worsening effusion.     Acute metabolic encephalopathy with delirium, multifactorial, slowly improving?  Hepatic encephalopathy  Chronic liver disease, history of liver transplant 2016  End-stage renal disease (ESRD), on hemodialysis (HD)  History of seizure, on Keppra and Vimpat  Waxing and waning mentation, coinciding with lactulose being held at [facility]  Earlier in hospital stay, remained confused and had pulled out tracheostomy tube, PICC line and pulled his dialysis catheter.  Lines replaced.  Psychiatry consulted, recent follow-up 2/21, 2/28, see notes.    Mental status continues to fluctuate with periods of alertness and increased sleepiness, overall improving.    Complicated, prolonged hospital course with mental status concerns multifactorial related to hepatic encephalopathy, end-stage renal disease on dialysis, past PEA arrest, seizure disorder, medication, and chronic liver disease with prior liver transplant.  As mental status fluctuates will continue to require one-to-one sitter as needed for added safety and supervision.  Reassess  daily.    Continue to reorient and redirect as able.  Avoid restraints if possible with goals of safety and close supervision (given multiple lines and tubes - dialysis catheter, PEG tube, IV, etc).    Maintain normal day/night cycles and sleep-wake cycles.  Minimize sedating medications as able.  Remains weak and deconditioned with complex, prolonged hospital course and limited mobility.  Encouragement and support offered daily to patient.    Continue Lactulose to 10 mg BID, monitor for symptoms; goal to have 2-3 bowel movements per day.    Ongoing hemodialysis, as per nephrology, 3X/week dialysis schedule.    Nephrology consult follow-up appreciated, dialysis patient.    Continue ramelteon 8 mg at bedtime; monitor for side effects including AM sedation, reassess daily.    Started on olanzapine 5 mg BID and PRN earlier during this hospitalization. Delirium improved, discontinued AM dose of olanzapine on non-dialysis days.    Seizure disorder stable, continue Keppra and Vimpat; monitor, no recurrent seizures reported of recent.    Reconsult psychiatry as needed.  Neurology consult appreciated.  Patient has been started on ropinirole for restless leg syndrome.     Bilateral pleural effusions   Acute respiratory failure requiring tracheostomy  - resolved    Pneumonia  - resolved   ARDS  - resolved    Right pneumothorax - resolved   *Initial event was parainfluenza and strep pneumo pneumonia back in November 2022. Treated for ARDS. Had failed extubation x2 and tracheostomy performed on previous hospitalization. *Had additional complications of bilateral pneumothoraces as well as hydropneumothorax- pleural fluid grew candida parapsilosis  *Completed 4-week antifungal treatment. While in LTACH he was successfully weaned from the ventilator.  *Recently there were concern for worsening effusion while at LTACH and had thoracentesis 01/13 which returned exudative without growth on cultures. CT chest/abdomen/pelvis on 01/18  demonstrated worsening effusions and concerns for infected parapneumonic effusions with possible SBP.  Multiple pulmonologist at [facility] reviewed CT scan recommended transfer to [facility] for consideration of chest tube placement and possible intrapleural lysis  *Thoracic surgery (Dr. Jackson) consulted during admission   *CT chest 1/22, small effusions on imaging and so chest tube was not inserted.   ID consulted, see note 2/10.  *Patient pulled out his tracheostomy tube on the night 2/1-2/2 and is tolerating well without increased shortness of breath persistent cough or worsening hypoxia.  * Chest x-ray 2/3 with cardiomegaly, chronic small bilateral pleural effusions, no pulmonary edema; right internal jugular dialysis catheter in place.   3/12 Pulmonary consult, see note, concerns of hypercapnia, atelectasis, with consideration of BiPAP trial; no recommendations for antibiotics with infiltrates felt to be atelectasis.    Stable, continue to monitor respiratory status, recently stable on room air; occasional cough reported.    Encourage incentive spirometry as able, up to chair, deep breathing.    Wound care previously consulted for tracheostomy site care, monitor for signs and symptoms of infection, healing well - resolved.    Encourage ambulation.    Possible splenic infract  Wedge shaped area on CT scan chest and CT abdomen on 3/7/23.    Hematology consulted, suspicion for infarct was low.    TTE no thrombus or vegetations.    No abdominal pain CTM.     S/p liver transplant 2016   Chronic immunosuppression, on tacrolimus  Cirrhosis with ascites  Subacute bacterial peritonitis (SBP), resolved  *Main manifestations of this are hepatic encephalopathy and ascites.  Hepatologist at [facility] felt that most likely reason for the cirrhosis was metabolic syndrome or alcohol intake.  There was no evidence of rejection on biopsy and there was some evidence of regeneration. Paracentesis done on 12/22/2022 showed  "lactobacillus indicating SBP, treated with Unasyn and Augmentin, finished 2-week course 1/12/2023.  Requires large-volume paracentesis periodically for recurring ascites.    *Paracentesis 1/13/2023 yielded about 7500 cc. Cultures NGTD. paracentesis on 1/24 with 4.8 L fluid removal  Paracentesis on 3/6/23 No SBP    Continue intermittent paracentesis as needed if develops symptomatic ascites.    Monitor for signs and symptoms of recurrent spontaneous bacterial peritonitis.    Further treatment for recurrent ascites with TIPS deferred to his Liver Transplant team and/or Hepatology, he has an appointment on 4/21/2023 with Hepatology, Dr. Simms.    Continue Tacrolimus 1 mg BID, rifaximin 550 mg BID.    Continue lactulose as ordered, titrate as needed to have 2-3 bms.    GI consult as needed in hospital for new acute issues, otherwise as outpatient.    Encourgae high protein diet.    CT abdomen on March 9, 2023 showed small to moderate ascites.    Goals of care   As per prior rounding provider, \"on 1/25 nursing had mentioned that patient had refused to undergo dialysis and want to stop care, palliative care was consulted.  Patient and his spouse denied wanting to stop care and wanted ongoing restorative care including full code\"    Monitor, Full Code status.    Needs placement to TCU.     Diarrhea, improved, on lactulose for chronic liver disease    C difficile negative on 1/31. Secondary to lactulose. Discontinued rectal tube (2/12) as stools more formed.    Continue lactulose with goal of 2 to 3 soft bowel movement in 24 hours.     Paroxysmal atrial fibrillation  Chronic anticoagulation, apixaban  History of embolic strokes  Remains in sinus rhythm, on anticoagulation with apixaban indefinitely for stroke prophylaxis.      Monitor rhythm.    Continue apixaban anticoagulation, it was briefly held for fistula placement.    Monitor hemoglobin, see below.     Acute anemia  Patient has required intermittent transfusions for " on-going anemia.  Anemia most likely secondary to critical illness/chronic disease, chronic renal disease.  Baseline hemoglobin around 7-8.  Likely Epo resistance.    Hemoglobin stable at 8.4 on 3/30/23.    Monitor intermittently.    Hypercalcemia  Josue hypercalcemia of Immobility    Nephrology following.    Hold VIT D.    Continue to hold Phoslo.    Monitor labs.    Encourage ambulation.    Nephrology considering prolia, however patient's wife would like to hold off for now.    History PEA arrest   PEA arrest prior to his previous admission and 1 episode of PEA associated with desaturation on 12/20.  No structural cardiac disease.     Stable, continue cardiac Monitoring.     Chronic Hypotension  In the past has developed baseline hypotension with cirrhosis and prolonged critical illness.  On hemodialysis.  Receiving midodrine and albumin during dialysis.    Continue midodrine, as per nephrology.      History of seizure   After hypoxic event, in the context of baseline multifactorial encephalopathy secondary to his ongoing critical illness and prior cardiac arrests and prior strokes and cirrhosis with hepatic encephalopathy. MRI of head of 12/20/22 reveals no PRES or leptomeningeal enhancement but scattered.  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   No recent seizure activity.    Continue levetiracetam, lacosamide.    Seizure precautions.    Outpatient follow-up with neurology, consult inpatient if needed.     ESRD  Anemia due to renal disease  Hypotension during dialysis  Hyperphosphatemia    As per nephrology.    Nephrology reviewing vein mapping to assess options for internal access placement for dialysis, see note 3/16.    Underwent Fistula placement on  3/21/22.    Vascular surgery following-continue to use for tunnel catheter.  Outpatient follow-up with vascular surgery and for will need a follow-up ultrasound of the fistula in 6 weeks to assess  patency.    Started sevalmer 800mg TID with meals on 3/27/23.    Complained of cramping with dialysis. Tried a dose of oxycodone prior to dialysis but this was not helpful. He will discuss the cramping with nephrology.     Diabetes mellitus type 2  *Hemoglobin A1c on November 2022 was 4.7  *By report, on Lantus insulin in the past.  Blood sugar checks and sliding scale insulin previously discontinued as has been stable without insulin needs.    Monitor with periodic blood sugar checks as needed.    Hypomagnesemia    Resolved with replacement.     Severe malnutrition, protein and calorie type  Moderate dysphagia  G-tube feedings    Continue with tube feeds via PEG tube.    IR replaced the PEG tube on 2/8/2023, monitor.    Nutritionist consulted and following for tube feeds.    SLP following as well. Oral diet as per speech and language therapy (SLP). Patient hopeful to advance to thin liquids soon.    2/20 Nutrition following for tube feeds.    Now undergoing PRN tube feeds.    Encourage high-protein diet.    Nutrition notes reviewed, patient eating 0 to 100% of meals.  Continue current interventions.  As needed tube feed boluses available if patient consumes less than 50% of the meal.    Family encouraging patient to eat high protein diet including protein bars.     Anxiety  Fluoxetine was discontinued as per psychiatry recommendation on 2/2 (see consult note for details).    Stable, monitor; comfort and reassurance offered during visit.    Psychiatry follow-up.    Restless leg  Syndrome    Patient started on ropiranole by neurology.       Diet: Room Service  Snacks/Supplements Adult: Other; Gelatein+ with brkfst and lunch, and magic cup with dinner (RD); With Meals  Combination Diet Regular Diet; Mildly Thick (level 2) (OK for fresh fruit (e.g., pineapple) per SLP)  Adult Formula Bolus Feeding: Novasource Renal; Route: Gastrostomy; 3 times daily; Volume per Bolus: 240; mL(s); Back up bolus for when pt consumes <50%  of meals: 80 mL/hr x 3 hrs    DVT Prophylaxis: DOAC  Nixon Catheter: Not present  Lines: PRESENT      CVC Double Lumen Right External jugular Tunneled-Site Assessment: WDL  Hemodialysis Vascular Access Arteriovenous fistula Left Forearm-Site Assessment: WDL      Cardiac Monitoring: None  Code Status: Full Code      Clinically Significant Risk Factors           # Hypercalcemia: Highest Ca = 10.9 mg/dL in last 2 days, will monitor as appropriate    # Hypoalbuminemia: Lowest albumin = 1.9 g/dL at 1/23/2023  6:38 AM, will monitor as appropriate   # Thrombocytopenia: Lowest platelets = 118 in last 2 days, will monitor for bleeding          # Severe Malnutrition: based on nutrition assessment        Disposition Plan      Expected Discharge Date: 04/05/2023, 12:00 PM  Discharge Delays: Placement - LTC  Destination: inpatient rehabilitation facility  Discharge Comments: Dialysis pt MWF. Will need placement TCU/LTC. [facility] willing to accept once no sitter, SW to follow up when sitter free          Franky Monet MD  Hospitalist Service  [facility]  Securely message with [employer] (more info)  Text page via [email or web address] Paging/Directory   ______________________________________________________________________    Interval History      Blanco Osborne was seen this morning.  Unfortunately still needing sitter  Intermittently agitated and impulsive  No CP/SOB  More calm this afternoon, no fevers  No new complaints    Physical Exam   Vital Signs: Temp: 98  F (36.7  C) Temp src: Oral BP: 103/73 Pulse: 75   Resp: 26 SpO2: 97 % O2 Device: None (Room air)    Weight: 180 lbs 15.96 oz    Constitutional: no apparent distress  Respiratory: no increased work of breathing, clear to auscultation bilaterally, no crackles or wheezing  Cardiovascular: regular rate and rhythm, normal S1 and S2, no murmur noted  GI: normal bowel sounds, soft, non-distended, non-tender  Skin: warm, dry  Musculoskeletal: no lower extremity pitting  edema present  Neurologic: awake, alert, appropriate in conversation, moves all extremities    Medical Decision Making       30 MINUTES SPENT BY ME on the date of service doing chart review, history, exam, documentation & further activities per the note.      Data     I have personally reviewed the following data over the past 24 hrs:    3.3 (L)  \   9.9 (L)   / 118 (L)     135 (L) 96 (L) 32.6 (H) /  99   3.4 26 3.51 (H) \       ALT: N/A AST: N/A AP: N/A TBILI: N/A   ALB: 3.7 TOT PROTEIN: N/A LIPASE: N/A

## 2023-04-03 NOTE — PROGRESS NOTES
DIALYSIS PROCEDURE NOTE  Hepatitis status of previous patient on machine log was checked and verified ok to use with this patients hepatitis status.  Patient dialyzed for 3 hrs. on a K3 bath with a net fluid removal of  2.5L.  A BFR of 350 ml/min was obtained via a RIJ catheter.   The treatment plan was discussed with  during the treatment.    Total heparin received during the treatment: 0 units.  Line flushed, clamped and capped with heparin 1:1000 1.9 mL (1900 units) per lumen     Meds  given: EPO 20,000units IV   Complications: None, tx tolerated well, VSS throughout run       Person educated: pt. Knowledge base substantial. Barriers to learning: confusion. Educated on access care via verbal mode. Patient verbalized understanding. Pt prefers verbal education style.      ICEBOAT? Timeout performed pre-treatment  I: Patient was identified using 2 identifiers  C:  Consent Signed Yes  E: Equipment preventative maintenance is current and dialysis delivery system OK to use  B: Hepatitis B Surface Antigen: neg; Draw Date: 3/1/2023      Hepatitis B Surface Antibody: succept; Draw Date: 3/9/2023  O: Dialysis orders present and complete prior to treatment  A: Vascular access verified and assessed prior to treatment  T: Treatment was performed at a clinically appropriate time  ?: Patient was allowed to ask questions and address concerns prior to treatment  See Adult Hemodialysis flowsheet in [employer] for further details and post assessment.  Machine water alarm in place and functioning. Transducer pods intact and checked every 15min.   Pt returned via bed.  Chlorine/Chloramine water system checked every 4 hours.  Outpatient Dialysis at [facility]     Patient repositioned every 2 hours during the treatment.  Post treatment report given to MISSY Roberts RN regarding 2.5L of fluid removed and last BP of 103/73.

## 2023-04-03 NOTE — PROGRESS NOTES
Assessment and Plan:   ESRD: HD MWF with R CVC. S/P LAF 3/21/23.   Dialysis today: no heparin, R CVC, 400 BFR, 3h, K protocol, 35 HCO3 and 140 Na.   Has had elevated Ca due to immobility.   Midodrine tid. Renvela with meals.   He remains on tacrolimus.             Interval History:   Anemia: on EPO (high dose) with dialysis. Will give venofer course. On oral folate. Will add oral B12.      Nutrition: on TF.   S/P liver transplant in 2016. On tacrolimus.               Review of Systems:   No sx on dialysis.           Medications:       - MEDICATION INSTRUCTIONS for Dialysis Patients -   Does not apply See Admin Instructions     sodium chloride 0.9%  250 mL Intravenous Once in dialysis/CRRT     sodium chloride 0.9%  300 mL Hemodialysis Machine Once     apixaban ANTICOAGULANT  5 mg Oral BID     epoetin mirta-epbx  20,000 Units Intravenous Once in dialysis/CRRT     folic acid  1 mg Oral or Feeding Tube Daily     guaiFENesin  600 mg Oral BID     sodium chloride (PF) 0.9%  10 mL Intracatheter Once in dialysis/CRRT    Followed by     heparin  1.3-2.6 mL Intracatheter Once in dialysis/CRRT     sodium chloride (PF) 0.9%  10 mL Intracatheter Once in dialysis/CRRT    Followed by     heparin  1.3-2.6 mL Intracatheter Once in dialysis/CRRT     lacosamide  100 mg Oral Q12H     lactulose  10 g Oral BID     levETIRAcetam  1,000 mg Oral Q24H     lidocaine   Transdermal Q8H BIJU     menthol   Transdermal Q8H     midodrine  10 mg Oral or Feeding Tube TID w/meals     mineral oil-hydrophilic petrolatum   Topical Daily     multivitamin RENAL  1 capsule Oral Daily     - MEDICATION INSTRUCTIONS -   Does not apply Once     OLANZapine zydis  5 mg Oral At Bedtime     OLANZapine zydis  5 mg Oral Once per day on Mon Wed Fri     pantoprazole  40 mg Oral QAM AC     ramelteon  8 mg Oral QPM     rifaximin  550 mg Oral or Feeding Tube BID     rOPINIRole  0.5 mg Oral At Bedtime     sevelamer carbonate  800 mg Oral TID w/meals     sodium  chloride (PF)  3 mL Intracatheter Q8H     tacrolimus  1 mg Oral Q12H       - MEDICATION INSTRUCTIONS -       - MEDICATION INSTRUCTIONS -       Current active medications and PTA medications reviewed, see medication list for details.            Physical Exam:   Vitals were reviewed  Patient Vitals for the past 24 hrs:   BP Temp Temp src Pulse Resp SpO2   23 0945 107/72 -- -- 76 25 96 %   23 0930 106/70 -- -- 74 26 96 %   23 0915 104/74 -- -- 75 27 96 %   23 0900 102/63 -- -- 74 29 97 %   23 0845 103/70 -- -- 74 27 97 %   23 0830 92/61 -- -- 67 21 97 %   23 0815 (!) 84/52 -- -- 65 22 97 %   23 0800 98/58 -- -- 65 22 96 %   23 0755 112/71 -- -- 68 29 93 %   23 0750 112/71 -- -- -- -- --   23 0745 108/70 97.8  F (36.6  C) Oral -- -- --   23 2320 122/74 97.6  F (36.4  C) Oral 74 18 100 %       Temp:  [97.6  F (36.4  C)-97.8  F (36.6  C)] 97.8  F (36.6  C)  Pulse:  [65-76] 76  Resp:  [18-29] 25  BP: ()/(52-74) 107/72  SpO2:  [93 %-100 %] 96 %    Temperatures:  Current - Temp: 97.8  F (36.6  C); Max - Temp  Av.7  F (36.5  C)  Min: 97.6  F (36.4  C)  Max: 97.8  F (36.6  C)  Respiration range: Resp  Av.6  Min: 18  Max: 29  Pulse range: Pulse  Av.2  Min: 65  Max: 76  Blood pressure range: Systolic (24hrs), Av , Min:84 , Max:122   ; Diastolic (24hrs), Av, Min:52, Max:74    Pulse oximetry range: SpO2  Av.5 %  Min: 93 %  Max: 100 %    No intake/output data recorded.    No intake or output data in the 24 hours ending 23 0948    Alert and responsive  Lungs with clear BS  R CVC with no redness or tenderness  LE no edema  Cor RRR nl S1 S2 no M       Wt Readings from Last 4 Encounters:   23 82.1 kg (181 lb)   23 82.4 kg (181 lb 11.2 oz)   22 96 kg (211 lb 10.3 oz)   22 100.2 kg (221 lb)          Data:          Lab Results   Component Value Date     2023     2023      03/31/2023     04/20/2020     10/07/2019     02/20/2019    Lab Results   Component Value Date    CHLORIDE 96 04/02/2023    CHLORIDE 99 04/01/2023    CHLORIDE 96 03/31/2023    CHLORIDE 103 12/29/2022    CHLORIDE 103 12/28/2022    CHLORIDE 102 12/27/2022    CHLORIDE 105 08/05/2020    CHLORIDE 106 04/20/2020    CHLORIDE 99 10/07/2019    CHLORIDE 108 08/01/2019    CHLORIDE 106 02/20/2019    Lab Results   Component Value Date    BUN 54.0 04/02/2023    BUN 44.5 04/01/2023    BUN 71.3 03/31/2023    BUN 46 12/29/2022    BUN 62 12/28/2022    BUN 52 12/27/2022    BUN 23 04/20/2020    BUN 18 10/07/2019    BUN 20 02/20/2019      Lab Results   Component Value Date    POTASSIUM 4.7 04/02/2023    POTASSIUM 4.6 04/01/2023    POTASSIUM 4.9 03/31/2023    POTASSIUM 3.6 01/19/2023    POTASSIUM  01/18/2023      Comment:      Disregard results.      POTASSIUM 3.6 01/17/2023    POTASSIUM 3.4 12/29/2022    POTASSIUM 3.7 12/28/2022    POTASSIUM 3.7 12/28/2022    POTASSIUM 3.9 04/20/2020    POTASSIUM 4.3 10/07/2019    POTASSIUM 4.2 02/20/2019    Lab Results   Component Value Date    CO2 25 04/02/2023    CO2 25 04/01/2023    CO2 25 03/31/2023    CO2 31 12/29/2022    CO2 29 12/28/2022    CO2 29 12/27/2022    CO2 28 04/20/2020    CO2 26 10/07/2019    CO2 24 02/20/2019    Lab Results   Component Value Date    CR 5.28 04/02/2023    CR 4.60 04/01/2023    CR 5.22 03/31/2023    CR 1.06 04/20/2020    CR 1.01 10/07/2019    CR 1.08 02/20/2019        Recent Labs   Lab Test 03/30/23  0859 03/21/23  0825 03/15/23  0935 03/14/23  1033   WBC 3.4*  --  4.3 4.0   HGB 8.4* 8.4* 8.7* 8.6*   HCT 28.3*  --  28.7* 29.1*     --  99 101*   * 134* 137* 128*     Recent Labs   Lab Test 03/25/23  1150 03/14/23  1033 03/13/23  1454 03/12/23  1116 03/12/23  0756 03/11/23  0846 03/10/23  1457   AST  --  20 21  --  18   < > 23   ALT  --  8* 9*  --  8*   < > 9*   ALKPHOS  --  177* 182*  --  200*   < > 231*   BILITOTAL  --  0.4 0.4  --  0.4   <  > 0.4   CHANDLER 69*  --   --  64*  --   --  30    < > = values in this interval not displayed.       Recent Labs   Lab Test 04/01/23  1027 03/31/23  0711 03/29/23  0800   MAG 2.0 2.1 2.1     Recent Labs   Lab Test 04/02/23  0536 04/01/23  1027 03/31/23  0711   PHOS 7.7* 5.8* 7.4*     Recent Labs   Lab Test 04/02/23  0536 04/01/23  1027 03/31/23  0711   KELLY 10.9* 10.9* 11.2*       Lab Results   Component Value Date    KELLY 10.9 (H) 04/02/2023     Lab Results   Component Value Date    WBC 3.4 (L) 03/30/2023    HGB 8.4 (L) 03/30/2023    HCT 28.3 (L) 03/30/2023     03/30/2023     (L) 03/30/2023     Lab Results   Component Value Date     04/02/2023    POTASSIUM 4.7 04/02/2023    CHLORIDE 96 (L) 04/02/2023    CO2 25 04/02/2023    GLC 83 04/02/2023     Lab Results   Component Value Date    BUN 54.0 (H) 04/02/2023    CR 5.28 (H) 04/02/2023     Lab Results   Component Value Date    MAG 2.0 04/01/2023     Lab Results   Component Value Date    PHOS 7.7 (H) 04/02/2023       Creatinine   Date Value Ref Range Status   04/02/2023 5.28 (H) 0.67 - 1.17 mg/dL Final   04/01/2023 4.60 (H) 0.67 - 1.17 mg/dL Final   03/31/2023 5.22 (H) 0.67 - 1.17 mg/dL Final   03/30/2023 4.36 (H) 0.67 - 1.17 mg/dL Final   03/29/2023 5.20 (H) 0.67 - 1.17 mg/dL Final   03/28/2023 4.54 (H) 0.67 - 1.17 mg/dL Final   04/20/2020 1.06 0.66 - 1.25 mg/dL Final   10/07/2019 1.01 0.66 - 1.25 mg/dL Final   02/20/2019 1.08 0.66 - 1.25 mg/dL Final   07/10/2018 1.32 (H) 0.66 - 1.25 mg/dL Final   10/31/2017 1.18 0.66 - 1.25 mg/dL Final   10/17/2017 1.26 (H) 0.66 - 1.25 mg/dL Final       Attestation:  I have reviewed today's vital signs, notes, medications, labs and imaging.  Seen on dialysis.      Elvis Mariano MD

## 2023-04-04 ENCOUNTER — APPOINTMENT (OUTPATIENT)
Dept: PHYSICAL THERAPY | Facility: CLINIC | Age: 58
DRG: 981 | End: 2023-04-04
Attending: STUDENT IN AN ORGANIZED HEALTH CARE EDUCATION/TRAINING PROGRAM
Payer: COMMERCIAL

## 2023-04-04 LAB
ALBUMIN SERPL BCG-MCNC: 3.6 G/DL (ref 3.5–5.2)
ANION GAP SERPL CALCULATED.3IONS-SCNC: 14 MMOL/L (ref 7–15)
BUN SERPL-MCNC: 50.1 MG/DL (ref 6–20)
CALCIUM SERPL-MCNC: 10.2 MG/DL (ref 8.6–10)
CHLORIDE SERPL-SCNC: 99 MMOL/L (ref 98–107)
CREAT SERPL-MCNC: 5.1 MG/DL (ref 0.67–1.17)
DEPRECATED HCO3 PLAS-SCNC: 26 MMOL/L (ref 22–29)
GFR SERPL CREATININE-BSD FRML MDRD: 12 ML/MIN/1.73M2
GLUCOSE SERPL-MCNC: 133 MG/DL (ref 70–99)
MAGNESIUM SERPL-MCNC: 2 MG/DL (ref 1.7–2.3)
PHOSPHATE SERPL-MCNC: 6.9 MG/DL (ref 2.5–4.5)
POTASSIUM SERPL-SCNC: 4 MMOL/L (ref 3.4–5.3)
SODIUM SERPL-SCNC: 139 MMOL/L (ref 136–145)

## 2023-04-04 PROCEDURE — 250N000013 HC RX MED GY IP 250 OP 250 PS 637: Performed by: STUDENT IN AN ORGANIZED HEALTH CARE EDUCATION/TRAINING PROGRAM

## 2023-04-04 PROCEDURE — 120N000001 HC R&B MED SURG/OB

## 2023-04-04 PROCEDURE — 83735 ASSAY OF MAGNESIUM: CPT | Performed by: STUDENT IN AN ORGANIZED HEALTH CARE EDUCATION/TRAINING PROGRAM

## 2023-04-04 PROCEDURE — 250N000011 HC RX IP 250 OP 636: Performed by: INTERNAL MEDICINE

## 2023-04-04 PROCEDURE — 80069 RENAL FUNCTION PANEL: CPT | Performed by: STUDENT IN AN ORGANIZED HEALTH CARE EDUCATION/TRAINING PROGRAM

## 2023-04-04 PROCEDURE — 250N000013 HC RX MED GY IP 250 OP 250 PS 637: Performed by: HOSPITALIST

## 2023-04-04 PROCEDURE — 36415 COLL VENOUS BLD VENIPUNCTURE: CPT | Performed by: STUDENT IN AN ORGANIZED HEALTH CARE EDUCATION/TRAINING PROGRAM

## 2023-04-04 PROCEDURE — 250N000013 HC RX MED GY IP 250 OP 250 PS 637: Performed by: PSYCHIATRY & NEUROLOGY

## 2023-04-04 PROCEDURE — 99232 SBSQ HOSP IP/OBS MODERATE 35: CPT | Performed by: INTERNAL MEDICINE

## 2023-04-04 PROCEDURE — 97110 THERAPEUTIC EXERCISES: CPT | Mod: GP

## 2023-04-04 PROCEDURE — 99231 SBSQ HOSP IP/OBS SF/LOW 25: CPT | Performed by: STUDENT IN AN ORGANIZED HEALTH CARE EDUCATION/TRAINING PROGRAM

## 2023-04-04 PROCEDURE — 250N000012 HC RX MED GY IP 250 OP 636 PS 637: Performed by: STUDENT IN AN ORGANIZED HEALTH CARE EDUCATION/TRAINING PROGRAM

## 2023-04-04 PROCEDURE — 258N000003 HC RX IP 258 OP 636: Performed by: INTERNAL MEDICINE

## 2023-04-04 PROCEDURE — 97116 GAIT TRAINING THERAPY: CPT | Mod: GP

## 2023-04-04 RX ADMIN — LACTULOSE 10 G: 10 SOLUTION ORAL at 09:17

## 2023-04-04 RX ADMIN — GUAIFENESIN 600 MG: 600 TABLET, EXTENDED RELEASE ORAL at 20:14

## 2023-04-04 RX ADMIN — RIFAXIMIN 550 MG: 550 TABLET ORAL at 20:14

## 2023-04-04 RX ADMIN — LACOSAMIDE 100 MG: 100 TABLET, FILM COATED ORAL at 00:30

## 2023-04-04 RX ADMIN — RAMELTEON 8 MG: 8 TABLET, FILM COATED ORAL at 20:14

## 2023-04-04 RX ADMIN — LACOSAMIDE 100 MG: 100 TABLET, FILM COATED ORAL at 23:43

## 2023-04-04 RX ADMIN — RIFAXIMIN 550 MG: 550 TABLET ORAL at 09:15

## 2023-04-04 RX ADMIN — MIDODRINE HYDROCHLORIDE 10 MG: 5 TABLET ORAL at 09:17

## 2023-04-04 RX ADMIN — WHITE PETROLATUM: 1.75 OINTMENT TOPICAL at 09:27

## 2023-04-04 RX ADMIN — MIDODRINE HYDROCHLORIDE 10 MG: 5 TABLET ORAL at 18:37

## 2023-04-04 RX ADMIN — GUAIFENESIN 600 MG: 600 TABLET, EXTENDED RELEASE ORAL at 09:16

## 2023-04-04 RX ADMIN — APIXABAN 5 MG: 5 TABLET, FILM COATED ORAL at 09:16

## 2023-04-04 RX ADMIN — IRON SUCROSE 200 MG: 20 INJECTION, SOLUTION INTRAVENOUS at 09:31

## 2023-04-04 RX ADMIN — OLANZAPINE 5 MG: 5 TABLET, ORALLY DISINTEGRATING ORAL at 21:18

## 2023-04-04 RX ADMIN — LACTULOSE 10 G: 10 SOLUTION ORAL at 20:15

## 2023-04-04 RX ADMIN — PANTOPRAZOLE SODIUM 40 MG: 40 TABLET, DELAYED RELEASE ORAL at 09:16

## 2023-04-04 RX ADMIN — TACROLIMUS 1 MG: 1 CAPSULE ORAL at 20:15

## 2023-04-04 RX ADMIN — MELATONIN 5 MG TABLET 10 MG: at 00:30

## 2023-04-04 RX ADMIN — Medication 1 CAPSULE: at 09:17

## 2023-04-04 RX ADMIN — SEVELAMER CARBONATE 800 MG: 800 TABLET, FILM COATED ORAL at 09:16

## 2023-04-04 RX ADMIN — SEVELAMER CARBONATE 800 MG: 800 TABLET, FILM COATED ORAL at 18:37

## 2023-04-04 RX ADMIN — MENTHOL 1 PATCH: 205.5 PATCH TOPICAL at 12:44

## 2023-04-04 RX ADMIN — FOLIC ACID 1 MG: 1 TABLET ORAL at 09:16

## 2023-04-04 RX ADMIN — APIXABAN 5 MG: 5 TABLET, FILM COATED ORAL at 20:14

## 2023-04-04 RX ADMIN — LACOSAMIDE 100 MG: 100 TABLET, FILM COATED ORAL at 12:41

## 2023-04-04 RX ADMIN — ROPINIROLE HYDROCHLORIDE 0.5 MG: 0.25 TABLET, FILM COATED ORAL at 21:18

## 2023-04-04 RX ADMIN — LEVETIRACETAM 1000 MG: 500 TABLET, FILM COATED ORAL at 21:17

## 2023-04-04 RX ADMIN — TACROLIMUS 1 MG: 1 CAPSULE ORAL at 09:16

## 2023-04-04 RX ADMIN — SEVELAMER CARBONATE 800 MG: 800 TABLET, FILM COATED ORAL at 12:41

## 2023-04-04 RX ADMIN — MIDODRINE HYDROCHLORIDE 10 MG: 5 TABLET ORAL at 12:41

## 2023-04-04 ASSESSMENT — ACTIVITIES OF DAILY LIVING (ADL)
ADLS_ACUITY_SCORE: 63
ADLS_ACUITY_SCORE: 59
ADLS_ACUITY_SCORE: 56
ADLS_ACUITY_SCORE: 63
ADLS_ACUITY_SCORE: 57
ADLS_ACUITY_SCORE: 56
ADLS_ACUITY_SCORE: 59
ADLS_ACUITY_SCORE: 56
ADLS_ACUITY_SCORE: 63
ADLS_ACUITY_SCORE: 56

## 2023-04-04 NOTE — PROGRESS NOTES
Assessment and Plan:   ESRD: HD MWF. Has R CVC but also new LAF. Orders placed.     On renvela. Midodrine tid.       Interval History:   Anemia: on EPO (high dose) with dialysis. Will give venofer course. On oral folate. One dose of IM B12 yesterday.       Nutrition: on TF.   S/P liver transplant in 2016. On tacrolimus.                 Review of Systems:   Complains of tremor on dialysis. Wants to run shorter.           Medications:       - MEDICATION INSTRUCTIONS for Dialysis Patients -   Does not apply See Admin Instructions     sodium chloride 0.9%  250 mL Intravenous Once in dialysis/CRRT     apixaban ANTICOAGULANT  5 mg Oral BID     folic acid  1 mg Oral or Feeding Tube Daily     guaiFENesin  600 mg Oral BID     iron sucrose  200 mg Intravenous Daily     lacosamide  100 mg Oral Q12H     lactulose  10 g Oral BID     levETIRAcetam  1,000 mg Oral Q24H     lidocaine   Transdermal Q8H BIJU     menthol   Transdermal Q8H     midodrine  10 mg Oral or Feeding Tube TID w/meals     mineral oil-hydrophilic petrolatum   Topical Daily     multivitamin RENAL  1 capsule Oral Daily     OLANZapine zydis  5 mg Oral At Bedtime     OLANZapine zydis  5 mg Oral Once per day on Mon Wed Fri     pantoprazole  40 mg Oral QAM AC     ramelteon  8 mg Oral QPM     rifaximin  550 mg Oral or Feeding Tube BID     rOPINIRole  0.5 mg Oral At Bedtime     sevelamer carbonate  800 mg Oral TID w/meals     sodium chloride (PF)  3 mL Intracatheter Q8H     tacrolimus  1 mg Oral Q12H       - MEDICATION INSTRUCTIONS -       - MEDICATION INSTRUCTIONS -       Current active medications and PTA medications reviewed, see medication list for details.            Physical Exam:   Vitals were reviewed  Patient Vitals for the past 24 hrs:   BP Temp Temp src Pulse Resp SpO2   04/04/23 0721 90/53 97.5  F (36.4  C) Oral 71 20 97 %   04/04/23 0023 93/55 97.3  F (36.3  C) Oral 83 18 93 %       Temp:  [97.3  F (36.3  C)-97.5  F (36.4  C)] 97.5  F (36.4   C)  Pulse:  [71-83] 71  Resp:  [18-20] 20  BP: (90-93)/(53-55) 90/53  SpO2:  [93 %-97 %] 97 %    Temperatures:  Current - Temp: 97.5  F (36.4  C); Max - Temp  Av.4  F (36.3  C)  Min: 97.3  F (36.3  C)  Max: 97.5  F (36.4  C)  Respiration range: Resp  Av  Min: 18  Max: 20  Pulse range: Pulse  Av  Min: 71  Max: 83  Blood pressure range: Systolic (24hrs), Av , Min:90 , Max:93   ; Diastolic (24hrs), Av, Min:53, Max:55    Pulse oximetry range: SpO2  Av %  Min: 93 %  Max: 97 %    I/O last 3 completed shifts:  In: 360 [P.O.:360]  Out: -       Intake/Output Summary (Last 24 hours) at 2023 1405  Last data filed at 2023 0754  Gross per 24 hour   Intake 480 ml   Output --   Net 480 ml       Alert and responsive  Lungs with clear BS  R CVC with no redness or tenderness  LAF with clean surgical incisions, + bruit  LE no edema  Abd feeding tube in place       Wt Readings from Last 4 Encounters:   23 82.1 kg (181 lb)   23 82.4 kg (181 lb 11.2 oz)   22 96 kg (211 lb 10.3 oz)   22 100.2 kg (221 lb)          Data:          Lab Results   Component Value Date     2023     2023     2023     2020     10/07/2019     2019    Lab Results   Component Value Date    CHLORIDE 99 2023    CHLORIDE 96 2023    CHLORIDE 96 2023    CHLORIDE 103 2022    CHLORIDE 103 2022    CHLORIDE 102 2022    CHLORIDE 105 2020    CHLORIDE 106 2020    CHLORIDE 99 10/07/2019    CHLORIDE 108 2019    CHLORIDE 106 2019    Lab Results   Component Value Date    BUN 50.1 2023    BUN 32.6 2023    BUN 54.0 2023    BUN 46 2022    BUN 62 2022    BUN 52 2022    BUN 23 2020    BUN 18 10/07/2019    BUN 20 2019      Lab Results   Component Value Date    POTASSIUM 4.0 2023    POTASSIUM 3.4 2023    POTASSIUM 4.7 2023    POTASSIUM 3.6  01/19/2023    POTASSIUM  01/18/2023      Comment:      Disregard results.      POTASSIUM 3.6 01/17/2023    POTASSIUM 3.4 12/29/2022    POTASSIUM 3.7 12/28/2022    POTASSIUM 3.7 12/28/2022    POTASSIUM 3.9 04/20/2020    POTASSIUM 4.3 10/07/2019    POTASSIUM 4.2 02/20/2019    Lab Results   Component Value Date    CO2 26 04/04/2023    CO2 26 04/03/2023    CO2 25 04/02/2023    CO2 31 12/29/2022    CO2 29 12/28/2022    CO2 29 12/27/2022    CO2 28 04/20/2020    CO2 26 10/07/2019    CO2 24 02/20/2019    Lab Results   Component Value Date    CR 5.10 04/04/2023    CR 3.51 04/03/2023    CR 5.28 04/02/2023    CR 1.06 04/20/2020    CR 1.01 10/07/2019    CR 1.08 02/20/2019        Recent Labs   Lab Test 04/03/23  1121 03/30/23  0859 03/21/23  0825 03/15/23  0935   WBC 3.3* 3.4*  --  4.3   HGB 9.9* 8.4* 8.4* 8.7*   HCT 31.8* 28.3*  --  28.7*   MCV 96 100  --  99   * 102* 134* 137*     Recent Labs   Lab Test 03/25/23  1150 03/14/23  1033 03/13/23  1454 03/12/23  1116 03/12/23  0756 03/11/23  0846 03/10/23  1457   AST  --  20 21  --  18   < > 23   ALT  --  8* 9*  --  8*   < > 9*   ALKPHOS  --  177* 182*  --  200*   < > 231*   BILITOTAL  --  0.4 0.4  --  0.4   < > 0.4   CHANDLER 69*  --   --  64*  --   --  30    < > = values in this interval not displayed.       Recent Labs   Lab Test 04/04/23  0837 04/03/23  1121 04/01/23  1027   MAG 2.0 1.8 2.0     Recent Labs   Lab Test 04/04/23  0837 04/03/23  1121 04/02/23  0536   PHOS 6.9* 4.0 7.7*     Recent Labs   Lab Test 04/04/23  0837 04/03/23  1121 04/02/23  0536   KELLY 10.2* 9.8 10.9*       Lab Results   Component Value Date    KELLY 10.2 (H) 04/04/2023     Lab Results   Component Value Date    WBC 3.3 (L) 04/03/2023    HGB 9.9 (L) 04/03/2023    HCT 31.8 (L) 04/03/2023    MCV 96 04/03/2023     (L) 04/03/2023     Lab Results   Component Value Date     04/04/2023    POTASSIUM 4.0 04/04/2023    CHLORIDE 99 04/04/2023    CO2 26 04/04/2023     (H) 04/04/2023     Lab  Results   Component Value Date    BUN 50.1 (H) 04/04/2023    CR 5.10 (H) 04/04/2023     Lab Results   Component Value Date    MAG 2.0 04/04/2023     Lab Results   Component Value Date    PHOS 6.9 (H) 04/04/2023       Creatinine   Date Value Ref Range Status   04/04/2023 5.10 (H) 0.67 - 1.17 mg/dL Final   04/03/2023 3.51 (H) 0.67 - 1.17 mg/dL Final   04/02/2023 5.28 (H) 0.67 - 1.17 mg/dL Final   04/01/2023 4.60 (H) 0.67 - 1.17 mg/dL Final   03/31/2023 5.22 (H) 0.67 - 1.17 mg/dL Final   03/30/2023 4.36 (H) 0.67 - 1.17 mg/dL Final   04/20/2020 1.06 0.66 - 1.25 mg/dL Final   10/07/2019 1.01 0.66 - 1.25 mg/dL Final   02/20/2019 1.08 0.66 - 1.25 mg/dL Final   07/10/2018 1.32 (H) 0.66 - 1.25 mg/dL Final   10/31/2017 1.18 0.66 - 1.25 mg/dL Final   10/17/2017 1.26 (H) 0.66 - 1.25 mg/dL Final       Attestation:  I have reviewed today's vital signs, notes, medications, labs and imaging.     Elvis Mariano MD

## 2023-04-04 NOTE — PLAN OF CARE
Goal Outcome Evaluation:       [id number]  Orientation:A&Ox2-3,disoriented to situation, time, confused.  Vitals/Tele: VSS on RA  IV Access/drains:  R chest CVC, double lumen for hemodialysis; R. PIV;PEG tube clamped; L fistula   Diet: Regular diet, mildly thickened liquids (level 2);   Mobility: Assist x 1 gait belt/walker  GI/: dialysis pt MWF, 2x BM on this shift  Wound/Skin:Scattered bruising; L forearm skin tear covered; L fistula site with sutures intact   Discharge Plan: TCU

## 2023-04-04 NOTE — PROGRESS NOTES
[facility] Progress Note     SH  65     Per previous visit last week, I checked in with Blanco and family today to offer support at the bedside after the weekend. Blanco was sleepy during this visit, but noted that he recently received communion and had another family member in town visiting from [place].     remains available to support this patient and family while admitted. Please consult  if any additional needs arise.     ------  Vitor Palacios M.Div.  Resident   Pager: [phone]

## 2023-04-04 NOTE — PLAN OF CARE
Goal Outcome Evaluation:      Plan of Care Reviewed With: patient    DATE & TIME: 4/3/2023, 0819-0163  Cognitive Concerns/ Orientation: A&O x2-3  Disoriented to time and situation with confusion at times   BEHAVIOR & AGGRESSION TOOL COLOR: Green         ABNL VS/O2: VSS on RA  MOBILITY: Assist x 1 gait belt/walker. 10 lb weight bear limit on L arm d/t new fistula. Remained in bed this evening due to fatigue.   PAIN MANAGMENT: PRN Tylenol available for low back pain  DIET: Regular diet, mildly thickened liquids (level 2); takes pills without difficulty. Bolus feeding available if pt eats <50% of meals. Pt refused tube feedings-MD aware  BOWEL/BLADDER: Continent of Bowel, Anuric (dialysis pt MWF).  ABNL LAB/BG: Na 135, Cr 3.51, wbc 3.3, hgb=9.9, platelets=118, urea=mag=1.8 (WDL, order placed for am check)   DRAIN/DEVICES: R chest CVC, double lumen for hemodialysis; R. PIV;PEG tube clamped; L fistula placed 3/21/23 with strong bruit/thrill present. Sutures open to air and CDI, no redness or swelling, no pain at site per pt report.  SKIN: Scattered bruising; L forearm skin tear covered; L fistula site with sutures intact   PROCEDURES: Dialysis MWF-completed today, tolerated  D/C DATE: Pending placement to TCU.  OTHER IMPORTANT INFO: Sitter remains at bedside for confusion.

## 2023-04-05 LAB
ALBUMIN SERPL BCG-MCNC: 3.6 G/DL (ref 3.5–5.2)
ALP SERPL-CCNC: 184 U/L (ref 40–129)
ALT SERPL W P-5'-P-CCNC: <5 U/L (ref 10–50)
ANION GAP SERPL CALCULATED.3IONS-SCNC: 15 MMOL/L (ref 7–15)
AST SERPL W P-5'-P-CCNC: 16 U/L (ref 10–50)
BILIRUB SERPL-MCNC: 0.4 MG/DL
BUN SERPL-MCNC: 66 MG/DL (ref 6–20)
CALCIUM SERPL-MCNC: 10.2 MG/DL (ref 8.6–10)
CHLORIDE SERPL-SCNC: 95 MMOL/L (ref 98–107)
CREAT SERPL-MCNC: 5.97 MG/DL (ref 0.67–1.17)
DEPRECATED HCO3 PLAS-SCNC: 24 MMOL/L (ref 22–29)
ERYTHROCYTE [DISTWIDTH] IN BLOOD BY AUTOMATED COUNT: 16 % (ref 10–15)
GFR SERPL CREATININE-BSD FRML MDRD: 10 ML/MIN/1.73M2
GLUCOSE BLDC GLUCOMTR-MCNC: 96 MG/DL (ref 70–99)
GLUCOSE SERPL-MCNC: 135 MG/DL (ref 70–99)
HCT VFR BLD AUTO: 28.2 % (ref 40–53)
HGB BLD-MCNC: 8.4 G/DL (ref 13.3–17.7)
MCH RBC QN AUTO: 29.6 PG (ref 26.5–33)
MCHC RBC AUTO-ENTMCNC: 29.8 G/DL (ref 31.5–36.5)
MCV RBC AUTO: 99 FL (ref 78–100)
PHOSPHATE SERPL-MCNC: 6.9 MG/DL (ref 2.5–4.5)
PLATELET # BLD AUTO: 97 10E3/UL (ref 150–450)
POTASSIUM SERPL-SCNC: 4 MMOL/L (ref 3.4–5.3)
PROT SERPL-MCNC: 7.4 G/DL (ref 6.4–8.3)
RBC # BLD AUTO: 2.84 10E6/UL (ref 4.4–5.9)
SODIUM SERPL-SCNC: 134 MMOL/L (ref 136–145)
WBC # BLD AUTO: 3.6 10E3/UL (ref 4–11)

## 2023-04-05 PROCEDURE — 84100 ASSAY OF PHOSPHORUS: CPT | Performed by: STUDENT IN AN ORGANIZED HEALTH CARE EDUCATION/TRAINING PROGRAM

## 2023-04-05 PROCEDURE — 250N000013 HC RX MED GY IP 250 OP 250 PS 637: Performed by: PSYCHIATRY & NEUROLOGY

## 2023-04-05 PROCEDURE — 250N000013 HC RX MED GY IP 250 OP 250 PS 637: Performed by: STUDENT IN AN ORGANIZED HEALTH CARE EDUCATION/TRAINING PROGRAM

## 2023-04-05 PROCEDURE — 90937 HEMODIALYSIS REPEATED EVAL: CPT

## 2023-04-05 PROCEDURE — 99231 SBSQ HOSP IP/OBS SF/LOW 25: CPT | Performed by: STUDENT IN AN ORGANIZED HEALTH CARE EDUCATION/TRAINING PROGRAM

## 2023-04-05 PROCEDURE — 90935 HEMODIALYSIS ONE EVALUATION: CPT | Performed by: INTERNAL MEDICINE

## 2023-04-05 PROCEDURE — 85027 COMPLETE CBC AUTOMATED: CPT | Performed by: STUDENT IN AN ORGANIZED HEALTH CARE EDUCATION/TRAINING PROGRAM

## 2023-04-05 PROCEDURE — 250N000013 HC RX MED GY IP 250 OP 250 PS 637: Performed by: HOSPITALIST

## 2023-04-05 PROCEDURE — 120N000001 HC R&B MED SURG/OB

## 2023-04-05 PROCEDURE — 258N000003 HC RX IP 258 OP 636: Performed by: INTERNAL MEDICINE

## 2023-04-05 PROCEDURE — 80053 COMPREHEN METABOLIC PANEL: CPT | Performed by: STUDENT IN AN ORGANIZED HEALTH CARE EDUCATION/TRAINING PROGRAM

## 2023-04-05 PROCEDURE — 634N000001 HC RX 634: Performed by: INTERNAL MEDICINE

## 2023-04-05 PROCEDURE — 250N000012 HC RX MED GY IP 250 OP 636 PS 637: Performed by: STUDENT IN AN ORGANIZED HEALTH CARE EDUCATION/TRAINING PROGRAM

## 2023-04-05 PROCEDURE — 250N000011 HC RX IP 250 OP 636: Performed by: INTERNAL MEDICINE

## 2023-04-05 PROCEDURE — 36415 COLL VENOUS BLD VENIPUNCTURE: CPT | Performed by: STUDENT IN AN ORGANIZED HEALTH CARE EDUCATION/TRAINING PROGRAM

## 2023-04-05 RX ORDER — ALBUMIN (HUMAN) 12.5 G/50ML
50 SOLUTION INTRAVENOUS
Status: DISCONTINUED | OUTPATIENT
Start: 2023-04-05 | End: 2023-04-05

## 2023-04-05 RX ADMIN — Medication 1 CAPSULE: at 12:55

## 2023-04-05 RX ADMIN — RIFAXIMIN 550 MG: 550 TABLET ORAL at 13:02

## 2023-04-05 RX ADMIN — PANTOPRAZOLE SODIUM 40 MG: 40 TABLET, DELAYED RELEASE ORAL at 12:55

## 2023-04-05 RX ADMIN — ACETAMINOPHEN 650 MG: 325 TABLET, FILM COATED ORAL at 17:54

## 2023-04-05 RX ADMIN — FOLIC ACID 1 MG: 1 TABLET ORAL at 12:55

## 2023-04-05 RX ADMIN — MIDODRINE HYDROCHLORIDE 10 MG: 5 TABLET ORAL at 12:55

## 2023-04-05 RX ADMIN — SODIUM CHLORIDE 250 ML: 9 INJECTION, SOLUTION INTRAVENOUS at 12:19

## 2023-04-05 RX ADMIN — LEVETIRACETAM 1000 MG: 500 TABLET, FILM COATED ORAL at 21:20

## 2023-04-05 RX ADMIN — LACOSAMIDE 100 MG: 100 TABLET, FILM COATED ORAL at 12:55

## 2023-04-05 RX ADMIN — WHITE PETROLATUM: 1.75 OINTMENT TOPICAL at 12:56

## 2023-04-05 RX ADMIN — RAMELTEON 8 MG: 8 TABLET, FILM COATED ORAL at 19:31

## 2023-04-05 RX ADMIN — RIFAXIMIN 550 MG: 550 TABLET ORAL at 19:31

## 2023-04-05 RX ADMIN — EPOETIN ALFA-EPBX 4000 UNITS: 4000 INJECTION, SOLUTION INTRAVENOUS; SUBCUTANEOUS at 12:18

## 2023-04-05 RX ADMIN — OLANZAPINE 5 MG: 5 TABLET, ORALLY DISINTEGRATING ORAL at 21:19

## 2023-04-05 RX ADMIN — TACROLIMUS 1 MG: 1 CAPSULE ORAL at 13:02

## 2023-04-05 RX ADMIN — MIDODRINE HYDROCHLORIDE 10 MG: 5 TABLET ORAL at 08:07

## 2023-04-05 RX ADMIN — HEPARIN SODIUM 1900 UNITS: 1000 INJECTION INTRAVENOUS; SUBCUTANEOUS at 12:19

## 2023-04-05 RX ADMIN — ROPINIROLE HYDROCHLORIDE 0.5 MG: 0.25 TABLET, FILM COATED ORAL at 21:20

## 2023-04-05 RX ADMIN — IRON SUCROSE 200 MG: 20 INJECTION, SOLUTION INTRAVENOUS at 13:07

## 2023-04-05 RX ADMIN — GUAIFENESIN 600 MG: 600 TABLET, EXTENDED RELEASE ORAL at 12:55

## 2023-04-05 RX ADMIN — HEPARIN SODIUM 1900 UNITS: 1000 INJECTION INTRAVENOUS; SUBCUTANEOUS at 12:18

## 2023-04-05 RX ADMIN — GUAIFENESIN 600 MG: 600 TABLET, EXTENDED RELEASE ORAL at 19:32

## 2023-04-05 RX ADMIN — ACETAMINOPHEN 650 MG: 325 TABLET, FILM COATED ORAL at 08:08

## 2023-04-05 RX ADMIN — LACTULOSE 10 G: 10 SOLUTION ORAL at 21:18

## 2023-04-05 RX ADMIN — LACTULOSE 10 G: 10 SOLUTION ORAL at 12:55

## 2023-04-05 RX ADMIN — SODIUM CHLORIDE 300 ML: 9 INJECTION, SOLUTION INTRAVENOUS at 12:19

## 2023-04-05 RX ADMIN — TACROLIMUS 1 MG: 1 CAPSULE ORAL at 19:32

## 2023-04-05 RX ADMIN — OLANZAPINE 5 MG: 5 TABLET, ORALLY DISINTEGRATING ORAL at 12:55

## 2023-04-05 RX ADMIN — APIXABAN 5 MG: 5 TABLET, FILM COATED ORAL at 19:32

## 2023-04-05 RX ADMIN — SEVELAMER CARBONATE 800 MG: 800 TABLET, FILM COATED ORAL at 12:55

## 2023-04-05 RX ADMIN — APIXABAN 5 MG: 5 TABLET, FILM COATED ORAL at 12:55

## 2023-04-05 RX ADMIN — MIDODRINE HYDROCHLORIDE 10 MG: 5 TABLET ORAL at 17:52

## 2023-04-05 RX ADMIN — SEVELAMER CARBONATE 800 MG: 800 TABLET, FILM COATED ORAL at 17:52

## 2023-04-05 ASSESSMENT — ACTIVITIES OF DAILY LIVING (ADL)
ADLS_ACUITY_SCORE: 56
ADLS_ACUITY_SCORE: 54
ADLS_ACUITY_SCORE: 54
ADLS_ACUITY_SCORE: 56
ADLS_ACUITY_SCORE: 54

## 2023-04-05 NOTE — PLAN OF CARE
DATE & TIME: 4/5/23 Day shift  Cognitive Concerns/ Orientation: A&O x2  Disoriented to time and situation with confusion at times; especially after waking up/dialysis  BEHAVIOR & AGGRESSION TOOL COLOR: Green         ABNL VS/O2: VSS on RA  MOBILITY: Assist x 1 gait belt/walker. 10 lb weight bear limit on L arm d/t new fistula;  PAIN MANAGMENT: Denies  DIET: Regular diet, mildly thickened liquids (level 2); takes pills without difficulty- great appetite  BOWEL/BLADDER: Bowel x2; Anuric (dialysis pt MWF).  ABNL LAB/BG: NA  DRAIN/DEVICES: R chest CVC, double lumen for hemodialysis; R. PIV; PEG tube clamped; L fistula placed 3/21/23 with strong bruit/thrill present. Sutures open to air and CDI, no redness or swelling, no pain at site per pt report.  SKIN: Scattered bruising; L forearm skin tear covered; L fistula site with sutures intact; dry scaly skin  PROCEDURES: Dialysis MWF- had it today  D/C DATE: Pending placement to TCU/long term   OTHER IMPORTANT INFO: Sitter remains at bedside for confusion/impulsiveness; sister also at bedside. Walked around unit several times with new shoes.

## 2023-04-05 NOTE — PLAN OF CARE
DATE & TIME: 4/4/23 NOC  Cognitive Concerns/ Orientation: A&O x2  Disoriented to time and situation with confusion at times; especially after waking up   BEHAVIOR & AGGRESSION TOOL COLOR: Green         ABNL VS/O2: VSS on RA  MOBILITY: Assist x 1 gait belt/walker. 10 lb weight bear limit on L arm d/t new fistula;  PAIN MANAGMENT: Denies  DIET: Regular diet, mildly thickened liquids (level 2); takes pills without difficulty- great appetite  BOWEL/BLADDER: Bowel x2; Anuric (dialysis pt MWF).  ABNL LAB/BG: NA  DRAIN/DEVICES: R chest CVC, double lumen for hemodialysis; R. PIV; PEG tube clamped; L fistula placed 3/21/23 with strong bruit/thrill present. Sutures open to air and CDI, no redness or swelling, no pain at site per pt report.  SKIN: Scattered bruising; L forearm skin tear covered; L fistula site with sutures intact; dry scaly skin  PROCEDURES: Dialysis MWF  D/C DATE: Pending placement to TCU/long term   OTHER IMPORTANT INFO: Sitter remains at bedside for confusion/impulsiveness

## 2023-04-05 NOTE — PROGRESS NOTES
Potassium   Date Value Ref Range Status   04/04/2023 4.0 3.4 - 5.3 mmol/L Final   12/29/2022 3.4 3.4 - 5.3 mmol/L Final   04/20/2020 3.9 3.4 - 5.3 mmol/L Final     Potassium POCT   Date Value Ref Range Status   01/19/2023 3.6 3.5 - 5.0 mmol/L Final     Hemoglobin   Date Value Ref Range Status   04/03/2023 9.9 (L) 13.3 - 17.7 g/dL Final   04/20/2020 14.3 13.3 - 17.7 g/dL Final     Creatinine   Date Value Ref Range Status   04/04/2023 5.10 (H) 0.67 - 1.17 mg/dL Final   04/20/2020 1.06 0.66 - 1.25 mg/dL Final     Urea Nitrogen   Date Value Ref Range Status   04/04/2023 50.1 (H) 6.0 - 20.0 mg/dL Final   12/29/2022 46 (H) 7 - 30 mg/dL Final   04/20/2020 23 7 - 30 mg/dL Final     Sodium   Date Value Ref Range Status   04/04/2023 139 136 - 145 mmol/L Final   04/20/2020 139 133 - 144 mmol/L Final     INR   Date Value Ref Range Status   12/21/2022 1.36 (H) 0.85 - 1.15 Final   10/07/2019 1.20 (H) 0.86 - 1.14 Final       DIALYSIS PROCEDURE NOTE  Hepatitis status of previous patient on machine log was checked and verified ok to use with this patients hepatitis status.  Patient dialyzed for 3 hrs. on a K3 bath with a net fluid removal of 2L.  A BFR of 300-200 ml/min was obtained via a RIJ catheter.   The treatment plan was discussed with  during the treatment.    Total heparin received during the treatment: 0 units.  Line flushed, clamped and capped with heparin 1:1000 1.9 mL (1900 units) per lumen     Meds  given: Epoetin 4,000units and 200mg of venofer given     Complications: High AP alarms during treatment, lines switched, HOB maintained low and reflushed lumens to clear unsuccessfully. BFR reduced to 200mL/min to provide continuous therapy     Person educated: patient. Knowledge base substantial. Barriers to learning: forgetful. Educated on access care., medication and procedure via verbal mode. Patient verbalized understanding. Pt prefers verbal education style.      ICEBOAT? Timeout performed pre-treatment  I:  Patient was identified using 2 identifiers  C:  Consent Signed Yes  E: Equipment preventative maintenance is current and dialysis delivery system OK to use  B: Hepatitis B Surface Antigen: Neg; Draw Date: 3/9/2023      Hepatitis B Surface Antibody: Susceptible; Draw Date: 3/9/2023  O: Dialysis orders present and complete prior to treatment  A: Vascular access verified and assessed prior to treatment  T: Treatment was performed at a clinically appropriate time  ?: Patient was allowed to ask questions and address concerns prior to treatment  See Adult Hemodialysis flowsheet in [employer] for further details and post assessment.  Machine water alarm in place and functioning. Transducer pods intact and checked every 15min.   Pt returned via bed transport.  Chlorine/Chloramine water system checked every 4 hours.  Outpatient Dialysis TBD    Patient repositioned every 2 hours during the treatment.  Post treatment report given to NIA Hurley RN regarding 2L of fluid removed and last BP of 111/77.

## 2023-04-05 NOTE — PLAN OF CARE
DATE & TIME: 4/4/23 5653-5683  Cognitive Concerns/ Orientation: A&O x2  Disoriented to time and situation with confusion at times; especially after waking up   BEHAVIOR & AGGRESSION TOOL COLOR: Green         ABNL VS/O2: VSS on RA  MOBILITY: Assist x 1 gait belt/walker. 10 lb weight bear limit on L arm d/t new fistula; walked several times today around unit  PAIN MANAGMENT: Denies  DIET: Regular diet, mildly thickened liquids (level 2); takes pills without difficulty- great appetite  BOWEL/BLADDER: Bowel x2; Anuric (dialysis pt MWF).  ABNL LAB/BG: NA  DRAIN/DEVICES: R chest CVC, double lumen for hemodialysis; R. PIV; PEG tube clamped; L fistula placed 3/21/23 with strong bruit/thrill present. Sutures open to air and CDI, no redness or swelling, no pain at site per pt report.  SKIN: Scattered bruising; L forearm skin tear covered; L fistula site with sutures intact; dry scaly skin  PROCEDURES: Dialysis MWF  D/C DATE: Pending placement to TCU/long term   OTHER IMPORTANT INFO: Sitter remains at bedside for confusion/impulsiveness

## 2023-04-05 NOTE — PROGRESS NOTES
Assessment and Plan:   ESRD: HD today 3 h, R  BFR, 2L UF. 3K 35 HCO3 and 140 Na. No heparin (pt on apixaban). 4000 U of EPO on dialysis. On midodrine, UF limited by low BP.     MWF dialysis.             Interval History:   Anemia: on EPO with dialysis. On venofer course. On oral folate. One dose of IM B12.       Nutrition: on TF.   S/P liver transplant in 2016. On tacrolimus.                  Review of Systems:   No complaints on dialysis.           Medications:       - MEDICATION INSTRUCTIONS for Dialysis Patients -   Does not apply See Admin Instructions     sodium chloride 0.9%  250 mL Intravenous Once in dialysis/CRRT     sodium chloride 0.9%  300 mL Hemodialysis Machine Once     sodium chloride 0.9%  250 mL Intravenous Once in dialysis/CRRT     apixaban ANTICOAGULANT  5 mg Oral BID     epoetin mirta-epbx  4,000 Units Intravenous Once in dialysis/CRRT     folic acid  1 mg Oral or Feeding Tube Daily     guaiFENesin  600 mg Oral BID     sodium chloride (PF) 0.9%  10 mL Intracatheter Once in dialysis/CRRT    Followed by     heparin  1.3-2.6 mL Intracatheter Once in dialysis/CRRT     sodium chloride (PF) 0.9%  10 mL Intracatheter Once in dialysis/CRRT    Followed by     heparin  1.3-2.6 mL Intracatheter Once in dialysis/CRRT     iron sucrose  200 mg Intravenous Daily     lacosamide  100 mg Oral Q12H     lactulose  10 g Oral BID     levETIRAcetam  1,000 mg Oral Q24H     lidocaine   Transdermal Q8H BIJU     menthol   Transdermal Q8H     midodrine  10 mg Oral or Feeding Tube TID w/meals     mineral oil-hydrophilic petrolatum   Topical Daily     multivitamin RENAL  1 capsule Oral Daily     - MEDICATION INSTRUCTIONS -   Does not apply Once     OLANZapine zydis  5 mg Oral At Bedtime     OLANZapine zydis  5 mg Oral Once per day on Mon Wed Fri     pantoprazole  40 mg Oral QAM AC     ramelteon  8 mg Oral QPM     rifaximin  550 mg Oral or Feeding Tube BID     rOPINIRole  0.5 mg Oral At Bedtime     sevelamer  carbonate  800 mg Oral TID w/meals     sodium chloride (PF)  3 mL Intracatheter Q8H     sodium chloride (PF)  9 mL Intracatheter During Dialysis/CRRT (from stock)     sodium chloride (PF)  9 mL Intracatheter During Dialysis/CRRT (from stock)     tacrolimus  1 mg Oral Q12H       - MEDICATION INSTRUCTIONS -       - MEDICATION INSTRUCTIONS -       Current active medications and PTA medications reviewed, see medication list for details.            Physical Exam:   Vitals were reviewed  Patient Vitals for the past 24 hrs:   BP Temp Temp src Pulse Resp SpO2   23 0742 111/72 97.8  F (36.6  C) Oral 74 20 95 %   23 2325 107/65 97  F (36.1  C) Oral 75 20 100 %   23 1930 100/55 -- Oral 71 18 95 %   23 1518 121/77 97.7  F (36.5  C) Oral 70 16 94 %       Temp:  [97  F (36.1  C)-97.8  F (36.6  C)] 97.8  F (36.6  C)  Pulse:  [70-75] 74  Resp:  [16-20] 20  BP: (100-121)/(55-77) 111/72  SpO2:  [94 %-100 %] 95 %    Temperatures:  Current - Temp: 97.8  F (36.6  C); Max - Temp  Av.5  F (36.4  C)  Min: 97  F (36.1  C)  Max: 97.8  F (36.6  C)  Respiration range: Resp  Av.5  Min: 16  Max: 20  Pulse range: Pulse  Av.5  Min: 70  Max: 75  Blood pressure range: Systolic (24hrs), Av , Min:100 , Max:121   ; Diastolic (24hrs), Av, Min:55, Max:77    Pulse oximetry range: SpO2  Av %  Min: 94 %  Max: 100 %    I/O last 3 completed shifts:  In: 480 [P.O.:480]  Out: -     No intake or output data in the 24 hours ending 23 0845    Resting in bed  LAF with clean surgical incisions, + bruit  R CVC with no redness or tenderness       Wt Readings from Last 4 Encounters:   23 82.1 kg (181 lb)   23 82.4 kg (181 lb 11.2 oz)   22 96 kg (211 lb 10.3 oz)   22 100.2 kg (221 lb)          Data:          Lab Results   Component Value Date     2023     2023     2023     2020     10/07/2019     2019    Lab Results    Component Value Date    CHLORIDE 99 04/04/2023    CHLORIDE 96 04/03/2023    CHLORIDE 96 04/02/2023    CHLORIDE 103 12/29/2022    CHLORIDE 103 12/28/2022    CHLORIDE 102 12/27/2022    CHLORIDE 105 08/05/2020    CHLORIDE 106 04/20/2020    CHLORIDE 99 10/07/2019    CHLORIDE 108 08/01/2019    CHLORIDE 106 02/20/2019    Lab Results   Component Value Date    BUN 50.1 04/04/2023    BUN 32.6 04/03/2023    BUN 54.0 04/02/2023    BUN 46 12/29/2022    BUN 62 12/28/2022    BUN 52 12/27/2022    BUN 23 04/20/2020    BUN 18 10/07/2019    BUN 20 02/20/2019      Lab Results   Component Value Date    POTASSIUM 4.0 04/04/2023    POTASSIUM 3.4 04/03/2023    POTASSIUM 4.7 04/02/2023    POTASSIUM 3.6 01/19/2023    POTASSIUM  01/18/2023      Comment:      Disregard results.      POTASSIUM 3.6 01/17/2023    POTASSIUM 3.4 12/29/2022    POTASSIUM 3.7 12/28/2022    POTASSIUM 3.7 12/28/2022    POTASSIUM 3.9 04/20/2020    POTASSIUM 4.3 10/07/2019    POTASSIUM 4.2 02/20/2019    Lab Results   Component Value Date    CO2 26 04/04/2023    CO2 26 04/03/2023    CO2 25 04/02/2023    CO2 31 12/29/2022    CO2 29 12/28/2022    CO2 29 12/27/2022    CO2 28 04/20/2020    CO2 26 10/07/2019    CO2 24 02/20/2019    Lab Results   Component Value Date    CR 5.10 04/04/2023    CR 3.51 04/03/2023    CR 5.28 04/02/2023    CR 1.06 04/20/2020    CR 1.01 10/07/2019    CR 1.08 02/20/2019        Recent Labs   Lab Test 04/03/23  1121 03/30/23  0859 03/21/23  0825 03/15/23  0935   WBC 3.3* 3.4*  --  4.3   HGB 9.9* 8.4* 8.4* 8.7*   HCT 31.8* 28.3*  --  28.7*   MCV 96 100  --  99   * 102* 134* 137*     Recent Labs   Lab Test 03/25/23  1150 03/14/23  1033 03/13/23  1454 03/12/23  1116 03/12/23  0756 03/11/23  0846 03/10/23  1457   AST  --  20 21  --  18   < > 23   ALT  --  8* 9*  --  8*   < > 9*   ALKPHOS  --  177* 182*  --  200*   < > 231*   BILITOTAL  --  0.4 0.4  --  0.4   < > 0.4   CHANDLER 69*  --   --  64*  --   --  30    < > = values in this interval not  displayed.       Recent Labs   Lab Test 04/04/23  0837 04/03/23  1121 04/01/23  1027   MAG 2.0 1.8 2.0     Recent Labs   Lab Test 04/04/23  0837 04/03/23  1121 04/02/23  0536   PHOS 6.9* 4.0 7.7*     Recent Labs   Lab Test 04/04/23  0837 04/03/23  1121 04/02/23  0536   KELLY 10.2* 9.8 10.9*       Lab Results   Component Value Date    KELLY 10.2 (H) 04/04/2023     Lab Results   Component Value Date    WBC 3.3 (L) 04/03/2023    HGB 9.9 (L) 04/03/2023    HCT 31.8 (L) 04/03/2023    MCV 96 04/03/2023     (L) 04/03/2023     Lab Results   Component Value Date     04/04/2023    POTASSIUM 4.0 04/04/2023    CHLORIDE 99 04/04/2023    CO2 26 04/04/2023    GLC 96 04/05/2023     Lab Results   Component Value Date    BUN 50.1 (H) 04/04/2023    CR 5.10 (H) 04/04/2023     Lab Results   Component Value Date    MAG 2.0 04/04/2023     Lab Results   Component Value Date    PHOS 6.9 (H) 04/04/2023       Creatinine   Date Value Ref Range Status   04/04/2023 5.10 (H) 0.67 - 1.17 mg/dL Final   04/03/2023 3.51 (H) 0.67 - 1.17 mg/dL Final   04/02/2023 5.28 (H) 0.67 - 1.17 mg/dL Final   04/01/2023 4.60 (H) 0.67 - 1.17 mg/dL Final   03/31/2023 5.22 (H) 0.67 - 1.17 mg/dL Final   03/30/2023 4.36 (H) 0.67 - 1.17 mg/dL Final   04/20/2020 1.06 0.66 - 1.25 mg/dL Final   10/07/2019 1.01 0.66 - 1.25 mg/dL Final   02/20/2019 1.08 0.66 - 1.25 mg/dL Final   07/10/2018 1.32 (H) 0.66 - 1.25 mg/dL Final   10/31/2017 1.18 0.66 - 1.25 mg/dL Final   10/17/2017 1.26 (H) 0.66 - 1.25 mg/dL Final       Attestation:  I have reviewed today's vital signs, notes, medications, labs and imaging.  Seen on dialysis.      Elvis Mariano MD

## 2023-04-05 NOTE — PROGRESS NOTES
[facility]    Medicine Progress Note - Hospitalist Service    Date of Admission:  1/21/2023  Date of Service: 04/05/2023    Assessment & Plan      Blanco Osborne is a 57 year-old male admitted on 1/21/2023 as a transfer from [facility] for evaluation of loculated pleural effusion. He has extensive PMH including h/o liver transplant 2016 due to alcohol abuse, pAF on chronic anticoagulation, history of diabetes mellitus type 2, CVA, hypertension, CHRISTIAN on BiPAP.  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 He was recovering in [facility] and was transferred to [facility] for consideration of chest tube placement and possible intrapleural lysis for worsening effusion.     Acute metabolic encephalopathy with delirium, multifactorial, slowly improving?  Hepatic encephalopathy  Chronic liver disease, history of liver transplant 2016  End-stage renal disease (ESRD), on hemodialysis (HD)  History of seizure, on Keppra and Vimpat  Waxing and waning mentation, coinciding with lactulose being held at [facility]  Earlier in hospital stay, remained confused and had pulled out tracheostomy tube, PICC line and pulled his dialysis catheter.  Lines replaced.  Psychiatry consulted, recent follow-up 2/21, 2/28, see notes.    Mental status continues to fluctuate with periods of alertness and increased sleepiness, overall improving.    Complicated, prolonged hospital course with mental status concerns multifactorial related to hepatic encephalopathy, end-stage renal disease on dialysis, past PEA arrest, seizure disorder, medication, and chronic liver disease with prior liver transplant.  As mental status fluctuates will continue to require one-to-one sitter as needed for added safety and supervision.  Reassess  daily.    Continue to reorient and redirect as able.  Avoid restraints if possible with goals of safety and close supervision (given multiple lines and tubes - dialysis catheter, PEG tube, IV, etc).    Maintain normal day/night cycles and sleep-wake cycles.  Minimize sedating medications as able.  Remains weak and deconditioned with complex, prolonged hospital course and limited mobility.  Encouragement and support offered daily to patient.    Continue Lactulose to 10 mg BID, monitor for symptoms; goal to have 2-3 bowel movements per day.    Ongoing hemodialysis, as per nephrology, 3X/week dialysis schedule.    Nephrology following    Continue ramelteon 8 mg at bedtime; monitor for side effects including AM sedation, reassess daily.    Started on olanzapine 5 mg BID and PRN earlier during this hospitalization. Delirium improved, discontinued AM dose of olanzapine on non-dialysis days.    Seizure disorder stable, continue Keppra and Vimpat; monitor, no recurrent seizures reported of recent.    Reconsult psychiatry as needed.  Neurology consult appreciated.  Patient has been started on ropinirole for restless leg syndrome.     Bilateral pleural effusions   Acute respiratory failure requiring tracheostomy  - resolved    Pneumonia  - resolved   ARDS  - resolved    Right pneumothorax - resolved   *Initial event was parainfluenza and strep pneumo pneumonia back in November 2022. Treated for ARDS. Had failed extubation x2 and tracheostomy performed on previous hospitalization. *Had additional complications of bilateral pneumothoraces as well as hydropneumothorax- pleural fluid grew candida parapsilosis  *Completed 4-week antifungal treatment. While in LTACH he was successfully weaned from the ventilator.  *Recently there were concern for worsening effusion while at LTACH and had thoracentesis 01/13 which returned exudative without growth on cultures. CT chest/abdomen/pelvis on 01/18 demonstrated worsening effusions and  concerns for infected parapneumonic effusions with possible SBP.  Multiple pulmonologist at LTACH reviewed CT scan recommended transfer to [facility] for consideration of chest tube placement and possible intrapleural lysis  *Thoracic surgery (Dr. Jackson) consulted during admission   *CT chest 1/22, small effusions on imaging and so chest tube was not inserted.   ID consulted, see note 2/10.  *Patient pulled out his tracheostomy tube on the night 2/1-2/2 and is tolerating well without increased shortness of breath persistent cough or worsening hypoxia.  * Chest x-ray 2/3 with cardiomegaly, chronic small bilateral pleural effusions, no pulmonary edema; right internal jugular dialysis catheter in place.   3/12 Pulmonary consult, see note, concerns of hypercapnia, atelectasis, with consideration of BiPAP trial; no recommendations for antibiotics with infiltrates felt to be atelectasis.    Stable, continue to monitor respiratory status, recently stable on room air; occasional cough reported.    Encourage incentive spirometry as able, up to chair, deep breathing.    Wound care previously consulted for tracheostomy site care, monitor for signs and symptoms of infection, healing well - resolved.    Encourage ambulation.    Possible splenic infract  Wedge shaped area on CT scan chest and CT abdomen on 3/7/23.    Hematology consulted, suspicion for infarct was low.    TTE no thrombus or vegetations.    No abdominal pain CTM.     S/p liver transplant 2016   Chronic immunosuppression, on tacrolimus  Cirrhosis with ascites  Subacute bacterial peritonitis (SBP), resolved  *Main manifestations of this are hepatic encephalopathy and ascites.  Hepatologist at [facility] felt that most likely reason for the cirrhosis was metabolic syndrome or alcohol intake.  There was no evidence of rejection on biopsy and there was some evidence of regeneration. Paracentesis done on 12/22/2022 showed lactobacillus indicating SBP, treated with  "Unasyn and Augmentin, finished 2-week course 1/12/2023.  Requires large-volume paracentesis periodically for recurring ascites.    *Paracentesis 1/13/2023 yielded about 7500 cc. Cultures NGTD. paracentesis on 1/24 with 4.8 L fluid removal  Paracentesis on 3/6/23 No SBP    Continue intermittent paracentesis as needed if develops symptomatic ascites.    Monitor for signs and symptoms of recurrent spontaneous bacterial peritonitis.    Further treatment for recurrent ascites with TIPS deferred to his Liver Transplant team and/or Hepatology, he has an appointment on 4/21/2023 with Hepatology, Dr. Simms.    Continue Tacrolimus 1 mg BID, rifaximin 550 mg BID.    Continue lactulose as ordered, titrate as needed to have 2-3 bms.    GI consult as needed in hospital for new acute issues, otherwise as outpatient.    Encourgae high protein diet.    CT abdomen on March 9, 2023 showed small to moderate ascites.    Goals of care   As per prior rounding provider, \"on 1/25 nursing had mentioned that patient had refused to undergo dialysis and want to stop care, palliative care was consulted.  Patient and his spouse denied wanting to stop care and wanted ongoing restorative care including full code\"    Monitor, Full Code status.    Needs placement to TCU.     Diarrhea, improved, on lactulose for chronic liver disease    C difficile negative on 1/31. Secondary to lactulose. Discontinued rectal tube (2/12) as stools more formed.    Continue lactulose with goal of 2 to 3 soft bowel movement in 24 hours.     Paroxysmal atrial fibrillation  Chronic anticoagulation, apixaban  History of embolic strokes  Remains in sinus rhythm, on anticoagulation with apixaban indefinitely for stroke prophylaxis.      Monitor rhythm.    Continue apixaban anticoagulation, it was briefly held for fistula placement.    Monitor hemoglobin, see below.     Acute anemia  Patient has required intermittent transfusions for on-going anemia.  Anemia most likely " secondary to critical illness/chronic disease, chronic renal disease.  Baseline hemoglobin around 7-8.  Likely Epo resistance.    Hemoglobin stable at 8.4 on 3/30/23.    Monitor intermittently.    Hypercalcemia  Likley hypercalcemia of Immobility    Nephrology following.    Hold VIT D.    Continue to hold Phoslo.    Monitor labs.    Encourage ambulation.    Nephrology considering prolia, however patient's wife would like to hold off for now.    History PEA arrest   PEA arrest prior to his previous admission and 1 episode of PEA associated with desaturation on 12/20.  No structural cardiac disease.     Stable, continue cardiac Monitoring.     Chronic Hypotension  In the past has developed baseline hypotension with cirrhosis and prolonged critical illness.  On hemodialysis.  Receiving midodrine and albumin during dialysis.    Continue midodrine, as per nephrology.      History of seizure   After hypoxic event, in the context of baseline multifactorial encephalopathy secondary to his ongoing critical illness and prior cardiac arrests and prior strokes and cirrhosis with hepatic encephalopathy. MRI of head of 12/20/22 reveals no PRES or leptomeningeal enhancement but scattered.  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   No recent seizure activity.    Continue levetiracetam, lacosamide.    Seizure precautions.    Outpatient follow-up with neurology, consult inpatient if needed.     ESRD  Anemia due to renal disease  Hypotension during dialysis  Hyperphosphatemia    As per nephrology.    Nephrology reviewing vein mapping to assess options for internal access placement for dialysis, see note 3/16.    Underwent Fistula placement on  3/21/22.    Vascular surgery following-continue to use for tunnel catheter.  Outpatient follow-up with vascular surgery and for will need a follow-up ultrasound of the fistula in 6 weeks to assess patency.    Started sevalmer 800mg TID with  meals on 3/27/23.    Complained of cramping with dialysis. Tried a dose of oxycodone prior to dialysis but this was not helpful. He will discuss the cramping with nephrology.     Diabetes mellitus type 2  *Hemoglobin A1c on November 2022 was 4.7  *By report, on Lantus insulin in the past.  Blood sugar checks and sliding scale insulin previously discontinued as has been stable without insulin needs.    Monitor with periodic blood sugar checks as needed.    Hypomagnesemia    Resolved with replacement.     Severe malnutrition, protein and calorie type  Moderate dysphagia  G-tube feedings    Continue with tube feeds via PEG tube.    IR replaced the PEG tube on 2/8/2023, monitor.    Nutritionist consulted and following for tube feeds.    SLP following as well. Oral diet as per speech and language therapy (SLP). Patient hopeful to advance to thin liquids soon.    2/20 Nutrition following for tube feeds.    Now undergoing PRN tube feeds.    Encourage high-protein diet.    Nutrition notes reviewed, patient eating 0 to 100% of meals.  Continue current interventions.  As needed tube feed boluses available if patient consumes less than 50% of the meal.    Family encouraging patient to eat high protein diet including protein bars.     Anxiety  Fluoxetine was discontinued as per psychiatry recommendation on 2/2 (see consult note for details).    Stable, monitor; comfort and reassurance offered during visit.    Psychiatry follow-up.    Restless leg  Syndrome    Patient started on ropiranole by neurology.       Diet: Room Service  Snacks/Supplements Adult: Other; Gelatein+ with brkfst and lunch, and magic cup with dinner (RD); With Meals  Combination Diet Regular Diet; Mildly Thick (level 2) (OK for fresh fruit (e.g., pineapple) per SLP)  Adult Formula Bolus Feeding: Novasource Renal; Route: Gastrostomy; 3 times daily; Volume per Bolus: 240; mL(s); Back up bolus for when pt consumes <50% of meals: 80 mL/hr x 3 hrs    DVT  Prophylaxis: DOAC  Nixon Catheter: Not present  Lines: PRESENT      CVC Double Lumen Right External jugular Tunneled-Site Assessment: WDL  Hemodialysis Vascular Access Arteriovenous fistula Left Forearm-Site Assessment: WDL;Bruit present;Thrill present      Cardiac Monitoring: None  Code Status: Full Code      Clinically Significant Risk Factors           # Hypercalcemia: Highest Ca = 10.2 mg/dL in last 2 days, will monitor as appropriate    # Hypoalbuminemia: Lowest albumin = 1.9 g/dL at 1/23/2023  6:38 AM, will monitor as appropriate   # Thrombocytopenia: Lowest platelets = 97 in last 2 days, will monitor for bleeding          # Severe Malnutrition: based on nutrition assessment        Disposition Plan      Expected Discharge Date: 04/12/2023, 12:00 PM  Discharge Delays: Placement - LTC  Destination: inpatient rehabilitation facility  Discharge Comments: Dialysis pt MWF. Will need placement TCU/LTC. [facility] willing to accept once no sitter, SW to follow up when sitter free          Franky Monet MD  Hospitalist Service  [facility]  Securely message with [employer] (more info)  Text page via [employer] Paging/Directory   ______________________________________________________________________    Interval History      Blanco Osborne was seen this morning.  Intermittently impulsive, but seems improving overall  No CP/SOB  No fevers recorded  No nausea / vomiting. Asking when he can be off dialysis.   No new complaints    Physical Exam   Vital Signs: Temp: 97.6  F (36.4  C) Temp src: Oral BP: 114/78 Pulse: 68   Resp: 21 SpO2: 96 % O2 Device: None (Room air)    Weight: 180 lbs 15.96 oz    Constitutional: no apparent distress  Respiratory: no increased work of breathing, clear to auscultation bilaterally, no crackles or wheezing  Cardiovascular: regular rate and rhythm, normal S1 and S2, no murmur noted  GI: normal bowel sounds, soft, non-distended, non-tender  Skin: warm, dry  Musculoskeletal: no lower  extremity pitting edema present  Neurologic: awake, alert, appropriate in conversation, moves all extremities    Medical Decision Making       40 MINUTES SPENT BY ME on the date of service doing chart review, history, exam, documentation & further activities per the note.      Data     I have personally reviewed the following data over the past 24 hrs:    3.6 (L)  \   8.4 (L)   / 97 (L)     134 (L) 95 (L) 66.0 (H) /  135 (H)   4.0 24 5.97 (H) \       ALT: <5 (L) AST: 16 AP: 184 (H) TBILI: 0.4   ALB: 3.6 TOT PROTEIN: 7.4 LIPASE: N/A

## 2023-04-06 ENCOUNTER — APPOINTMENT (OUTPATIENT)
Dept: PHYSICAL THERAPY | Facility: CLINIC | Age: 58
DRG: 981 | End: 2023-04-06
Attending: STUDENT IN AN ORGANIZED HEALTH CARE EDUCATION/TRAINING PROGRAM
Payer: COMMERCIAL

## 2023-04-06 LAB
ALBUMIN SERPL BCG-MCNC: 3.7 G/DL (ref 3.5–5.2)
ANION GAP SERPL CALCULATED.3IONS-SCNC: 13 MMOL/L (ref 7–15)
BUN SERPL-MCNC: 47.1 MG/DL (ref 6–20)
CALCIUM SERPL-MCNC: 10.2 MG/DL (ref 8.6–10)
CHLORIDE SERPL-SCNC: 99 MMOL/L (ref 98–107)
CREAT SERPL-MCNC: 4.89 MG/DL (ref 0.67–1.17)
DEPRECATED HCO3 PLAS-SCNC: 25 MMOL/L (ref 22–29)
GFR SERPL CREATININE-BSD FRML MDRD: 13 ML/MIN/1.73M2
GLUCOSE SERPL-MCNC: 103 MG/DL (ref 70–99)
IRON SERPL-MCNC: 78 UG/DL (ref 61–157)
LACTATE SERPL-SCNC: 1.3 MMOL/L (ref 0.7–2)
PHOSPHATE SERPL-MCNC: 5.6 MG/DL (ref 2.5–4.5)
POTASSIUM SERPL-SCNC: 3.8 MMOL/L (ref 3.4–5.3)
SODIUM SERPL-SCNC: 137 MMOL/L (ref 136–145)

## 2023-04-06 PROCEDURE — 80069 RENAL FUNCTION PANEL: CPT | Performed by: STUDENT IN AN ORGANIZED HEALTH CARE EDUCATION/TRAINING PROGRAM

## 2023-04-06 PROCEDURE — 120N000001 HC R&B MED SURG/OB

## 2023-04-06 PROCEDURE — 250N000013 HC RX MED GY IP 250 OP 250 PS 637: Performed by: PSYCHIATRY & NEUROLOGY

## 2023-04-06 PROCEDURE — 250N000013 HC RX MED GY IP 250 OP 250 PS 637: Performed by: STUDENT IN AN ORGANIZED HEALTH CARE EDUCATION/TRAINING PROGRAM

## 2023-04-06 PROCEDURE — 250N000013 HC RX MED GY IP 250 OP 250 PS 637: Performed by: HOSPITALIST

## 2023-04-06 PROCEDURE — 83605 ASSAY OF LACTIC ACID: CPT | Performed by: STUDENT IN AN ORGANIZED HEALTH CARE EDUCATION/TRAINING PROGRAM

## 2023-04-06 PROCEDURE — 250N000012 HC RX MED GY IP 250 OP 636 PS 637: Performed by: STUDENT IN AN ORGANIZED HEALTH CARE EDUCATION/TRAINING PROGRAM

## 2023-04-06 PROCEDURE — 97110 THERAPEUTIC EXERCISES: CPT | Mod: GP

## 2023-04-06 PROCEDURE — 97116 GAIT TRAINING THERAPY: CPT | Mod: GP

## 2023-04-06 PROCEDURE — 99231 SBSQ HOSP IP/OBS SF/LOW 25: CPT | Performed by: STUDENT IN AN ORGANIZED HEALTH CARE EDUCATION/TRAINING PROGRAM

## 2023-04-06 PROCEDURE — 36415 COLL VENOUS BLD VENIPUNCTURE: CPT | Performed by: STUDENT IN AN ORGANIZED HEALTH CARE EDUCATION/TRAINING PROGRAM

## 2023-04-06 PROCEDURE — 83540 ASSAY OF IRON: CPT | Performed by: STUDENT IN AN ORGANIZED HEALTH CARE EDUCATION/TRAINING PROGRAM

## 2023-04-06 RX ADMIN — LACTULOSE 10 G: 10 SOLUTION ORAL at 09:26

## 2023-04-06 RX ADMIN — FOLIC ACID 1 MG: 1 TABLET ORAL at 09:27

## 2023-04-06 RX ADMIN — TACROLIMUS 1 MG: 1 CAPSULE ORAL at 09:27

## 2023-04-06 RX ADMIN — LACOSAMIDE 100 MG: 100 TABLET, FILM COATED ORAL at 00:33

## 2023-04-06 RX ADMIN — GUAIFENESIN 600 MG: 600 TABLET, EXTENDED RELEASE ORAL at 09:27

## 2023-04-06 RX ADMIN — RAMELTEON 8 MG: 8 TABLET, FILM COATED ORAL at 21:24

## 2023-04-06 RX ADMIN — APIXABAN 5 MG: 5 TABLET, FILM COATED ORAL at 09:27

## 2023-04-06 RX ADMIN — ACETAMINOPHEN 650 MG: 325 TABLET, FILM COATED ORAL at 00:37

## 2023-04-06 RX ADMIN — TACROLIMUS 1 MG: 1 CAPSULE ORAL at 21:22

## 2023-04-06 RX ADMIN — MIDODRINE HYDROCHLORIDE 10 MG: 5 TABLET ORAL at 18:16

## 2023-04-06 RX ADMIN — ROPINIROLE HYDROCHLORIDE 0.5 MG: 0.25 TABLET, FILM COATED ORAL at 21:24

## 2023-04-06 RX ADMIN — MIDODRINE HYDROCHLORIDE 10 MG: 5 TABLET ORAL at 14:19

## 2023-04-06 RX ADMIN — RIFAXIMIN 550 MG: 550 TABLET ORAL at 09:27

## 2023-04-06 RX ADMIN — OLANZAPINE 5 MG: 5 TABLET, ORALLY DISINTEGRATING ORAL at 21:23

## 2023-04-06 RX ADMIN — LACOSAMIDE 100 MG: 100 TABLET, FILM COATED ORAL at 14:20

## 2023-04-06 RX ADMIN — ACETAMINOPHEN 650 MG: 325 TABLET, FILM COATED ORAL at 21:24

## 2023-04-06 RX ADMIN — GUAIFENESIN 600 MG: 600 TABLET, EXTENDED RELEASE ORAL at 21:23

## 2023-04-06 RX ADMIN — SEVELAMER CARBONATE 800 MG: 800 TABLET, FILM COATED ORAL at 09:27

## 2023-04-06 RX ADMIN — LEVETIRACETAM 1000 MG: 500 TABLET, FILM COATED ORAL at 21:23

## 2023-04-06 RX ADMIN — WHITE PETROLATUM: 1.75 OINTMENT TOPICAL at 09:30

## 2023-04-06 RX ADMIN — PANTOPRAZOLE SODIUM 40 MG: 40 TABLET, DELAYED RELEASE ORAL at 09:27

## 2023-04-06 RX ADMIN — APIXABAN 5 MG: 5 TABLET, FILM COATED ORAL at 21:23

## 2023-04-06 RX ADMIN — MIDODRINE HYDROCHLORIDE 10 MG: 5 TABLET ORAL at 09:27

## 2023-04-06 RX ADMIN — RIFAXIMIN 550 MG: 550 TABLET ORAL at 21:23

## 2023-04-06 RX ADMIN — LACTULOSE 10 G: 10 SOLUTION ORAL at 21:22

## 2023-04-06 RX ADMIN — Medication 1 CAPSULE: at 09:27

## 2023-04-06 RX ADMIN — SEVELAMER CARBONATE 800 MG: 800 TABLET, FILM COATED ORAL at 18:16

## 2023-04-06 RX ADMIN — SEVELAMER CARBONATE 800 MG: 800 TABLET, FILM COATED ORAL at 14:19

## 2023-04-06 ASSESSMENT — ACTIVITIES OF DAILY LIVING (ADL)
ADLS_ACUITY_SCORE: 54

## 2023-04-06 NOTE — PLAN OF CARE
DATE & TIME: 4/6/23 Day shift  Cognitive Concerns/ Orientation: A&O x2  Disoriented to time and situation with confusion at times.  BEHAVIOR & AGGRESSION TOOL COLOR: Green        ABNL VS/O2: VSS RA   MOBILITY: Assist x 1 gait belt/walker. 10 lb weight bear limit on L arm d/t new fistula; walked around unit 5x or more  PAIN MANAGMENT: Denies  DIET: Regular diet, mildly thickened liquids (level 2); takes pills without difficulty- great appetite  BOWEL/BLADDER: Had 3 stools; on hemadialysis  (dialysis pt MWF).  ABNL LAB/BG: LA 1.3  DRAIN/DEVICES: R chest CVC, double lumen for hemodialysis; R. PIV; PEG tube clamped; L fistula placed 3/21/23 with strong bruit/thrill present. Sutures open to air and CDI, no redness or swelling, no pain at site per pt report.  SKIN: Scattered bruising; L forearm skin tear covered; L fistula site with sutures intact; dry scaly skin  PROCEDURES: Dialysis MWF-tomorrow  D/C DATE: Pending placement    OTHER IMPORTANT INFO: Sitter remains at bedside for confusion/impulsiveness;

## 2023-04-06 NOTE — PLAN OF CARE
Goal Outcome Evaluation:         DATE & TIME: 4/5/23-4/6/23 [id number]  Cognitive Concerns/ Orientation: A&O x2  Disoriented to time and situation with confusion at times.  BEHAVIOR & AGGRESSION TOOL COLOR: Green        ABNL VS/O2: VSS RA   MOBILITY: Assist x 1 gait belt/walker. 10 lb weight bear limit on L arm d/t new fistula;  PAIN MANAGMENT: Tylenol x1 for back pain   DIET: Regular diet, mildly thickened liquids (level 2); takes pills without difficulty- great appetite  BOWEL/BLADDER: No BM this shift, Anuric-dialysis pt  (dialysis pt MWF).  ABNL LAB/BG: NA  DRAIN/DEVICES: R chest CVC, double lumen for hemodialysis; R. PIV; PEG tube clamped; L fistula placed 3/21/23 with strong bruit/thrill present. Sutures open to air and CDI, no redness or swelling, no pain at site per pt report.  SKIN: Scattered bruising; L forearm skin tear covered; L fistula site with sutures intact; dry scaly skin  PROCEDURES: Dialysis MWF  D/C DATE: Pending placement to TCU/LTC   OTHER IMPORTANT INFO: Sitter remains at bedside for confusion/impulsiveness.

## 2023-04-06 NOTE — PROGRESS NOTES
Need xrays sent over to office.  Any questions call Jami Ventura  Future Appointments   Date Time Provider Tomasa Mock   6/14/2023  9:30 AM Dell Tello MD [facility] Social Work Note:  Patient chart reviewed.  Patient discussed in morning rounds.  Barriers to discharge:   * Trach.  Phase  2 30%/30L TM  * Rectal Tube  * tele monitor  * WOC-bilat feet.    Patient here with Trach, Nebs:Duo/Muco QID, rectal tube, tele monitor, WOC, and feeding tube.  Patient's exact discharge date, time, disposition TBD  SW will continue to follow for psychosocial and emotional support of patient and family. SW to facilitate discharge to TCU when pt no longer requires LTACH level of care.      Kb Liu, [facility] HospitalSW  [facility]  [phone]

## 2023-04-06 NOTE — PROGRESS NOTES
[facility]    Medicine Progress Note - Hospitalist Service    Date of Admission:  1/21/2023  Date of Service: 04/06/2023    Assessment & Plan      Blanco Osborne is a 57 year-old male admitted on 1/21/2023 as a transfer from [facility] for evaluation of loculated pleural effusion. He has extensive PMH including h/o liver transplant 2016 due to alcohol abuse, pAF on chronic anticoagulation, history of diabetes mellitus type 2, CVA, hypertension, CHRISTIAN on BiPAP.  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 He was recovering in [facility] and was transferred to [facility] for consideration of chest tube placement and possible intrapleural lysis for worsening effusion.     Acute metabolic encephalopathy with delirium, multifactorial, slowly improving?  Hepatic encephalopathy  Chronic liver disease, history of liver transplant 2016  End-stage renal disease (ESRD), on hemodialysis (HD)  History of seizure, on Keppra and Vimpat  Waxing and waning mentation, coinciding with lactulose being held at [facility]  Earlier in hospital stay, remained confused and had pulled out tracheostomy tube, PICC line and pulled his dialysis catheter.  Lines replaced.  Psychiatry consulted, recent follow-up 2/21, 2/28, see notes.    Mental status continues to fluctuate with periods of alertness and increased sleepiness, overall improving.    Complicated, prolonged hospital course with mental status concerns multifactorial related to hepatic encephalopathy, end-stage renal disease on dialysis, past PEA arrest, seizure disorder, medication, and chronic liver disease with prior liver transplant.  As mental status fluctuates will continue to require one-to-one sitter as needed for added safety and supervision.  Reassess  daily.    Continue to reorient and redirect as able.  Avoid restraints if possible with goals of safety and close supervision (given multiple lines and tubes - dialysis catheter, PEG tube, IV, etc).    Maintain normal day/night cycles and sleep-wake cycles.  Minimize sedating medications as able.  Remains weak and deconditioned with complex, prolonged hospital course and limited mobility.  Encouragement and support offered daily to patient.    Continue Lactulose to 10 mg BID, monitor for symptoms; goal to have 2-3 bowel movements per day.    Ongoing hemodialysis, as per nephrology, 3X/week dialysis schedule.    Nephrology following    Continue ramelteon 8 mg at bedtime; monitor for side effects including AM sedation, reassess daily.    Started on olanzapine 5 mg BID and PRN earlier during this hospitalization. Delirium improved, discontinued AM dose of olanzapine on non-dialysis days.    Seizure disorder stable, continue Keppra and Vimpat; monitor, no recurrent seizures reported of recent.    Reconsult psychiatry as needed.  Neurology consult appreciated.  Patient has been started on ropinirole for restless leg syndrome.     Bilateral pleural effusions   Acute respiratory failure requiring tracheostomy  - resolved    Pneumonia  - resolved   ARDS  - resolved    Right pneumothorax - resolved   *Initial event was parainfluenza and strep pneumo pneumonia back in November 2022. Treated for ARDS. Had failed extubation x2 and tracheostomy performed on previous hospitalization. *Had additional complications of bilateral pneumothoraces as well as hydropneumothorax- pleural fluid grew candida parapsilosis  *Completed 4-week antifungal treatment. While in [facility] he was successfully weaned from the ventilator.  *Recently there were concern for worsening effusion while at [facility] and had thoracentesis 01/13 which returned exudative without growth on cultures. CT chest/abdomen/pelvis on 01/18 demonstrated worsening effusions and  concerns for infected parapneumonic effusions with possible SBP.  Multiple pulmonologist at [facility] reviewed CT scan recommended transfer to [facility] for consideration of chest tube placement and possible intrapleural lysis  *Thoracic surgery (Dr. Jackson) consulted during admission   *CT chest 1/22, small effusions on imaging and so chest tube was not inserted.   ID consulted, see note 2/10.  *Patient pulled out his tracheostomy tube on the night 2/1-2/2 and is tolerating well without increased shortness of breath persistent cough or worsening hypoxia.  * Chest x-ray 2/3 with cardiomegaly, chronic small bilateral pleural effusions, no pulmonary edema; right internal jugular dialysis catheter in place.   3/12 Pulmonary consult, see note, concerns of hypercapnia, atelectasis, with consideration of BiPAP trial; no recommendations for antibiotics with infiltrates felt to be atelectasis.    Stable, continue to monitor respiratory status, recently stable on room air; occasional cough reported.    Encourage incentive spirometry as able, up to chair, deep breathing.    Wound care previously consulted for tracheostomy site care, monitor for signs and symptoms of infection, healing well - resolved.    Encourage ambulation.    Possible splenic infract  Wedge shaped area on CT scan chest and CT abdomen on 3/7/23.    Hematology consulted, suspicion for infarct was low.    TTE no thrombus or vegetations.    No abdominal pain CTM.     S/p liver transplant 2016   Chronic immunosuppression, on tacrolimus  Cirrhosis with ascites  Subacute bacterial peritonitis (SBP), resolved  *Main manifestations of this are hepatic encephalopathy and ascites.  Hepatologist at [place] felt that most likely reason for the cirrhosis was metabolic syndrome or alcohol intake.  There was no evidence of rejection on biopsy and there was some evidence of regeneration. Paracentesis done on 12/22/2022 showed lactobacillus indicating SBP, treated with  "Unasyn and Augmentin, finished 2-week course 1/12/2023.  Requires large-volume paracentesis periodically for recurring ascites.    *Paracentesis 1/13/2023 yielded about 7500 cc. Cultures NGTD. paracentesis on 1/24 with 4.8 L fluid removal  Paracentesis on 3/6/23 No SBP    Continue intermittent paracentesis as needed if develops symptomatic ascites.    Monitor for signs and symptoms of recurrent spontaneous bacterial peritonitis.    Further treatment for recurrent ascites with TIPS deferred to his Liver Transplant team and/or Hepatology, he has an appointment on 4/21/2023 with Hepatology, Dr. Simms.    Continue Tacrolimus 1 mg BID, rifaximin 550 mg BID.    Continue lactulose as ordered, titrate as needed to have 2-3 bms.    GI consult as needed in hospital for new acute issues, otherwise as outpatient.    Encourgae high protein diet.    CT abdomen on March 9, 2023 showed small to moderate ascites.    Goals of care   As per prior rounding provider, \"on 1/25 nursing had mentioned that patient had refused to undergo dialysis and want to stop care, palliative care was consulted.  Patient and his spouse denied wanting to stop care and wanted ongoing restorative care including full code\"    Monitor, Full Code status.    Needs placement to TCU.     Diarrhea, improved, on lactulose for chronic liver disease    C difficile negative on 1/31. Secondary to lactulose. Discontinued rectal tube (2/12) as stools more formed.    Continue lactulose with goal of 2 to 3 soft bowel movement in 24 hours.     Paroxysmal atrial fibrillation  Chronic anticoagulation, apixaban  History of embolic strokes  Remains in sinus rhythm, on anticoagulation with apixaban indefinitely for stroke prophylaxis.      Monitor rhythm.    Continue apixaban anticoagulation, it was briefly held for fistula placement.    Monitor hemoglobin, see below.     Acute anemia  Patient has required intermittent transfusions for on-going anemia.  Anemia most likely " secondary to critical illness/chronic disease, chronic renal disease.  Baseline hemoglobin around 7-8.  Likely Epo resistance.    Hemoglobin stable at 8.4 on 3/30/23.    Monitor intermittently.    Hypercalcemia  Likley hypercalcemia of Immobility    Nephrology following.    Hold VIT D.    Continue to hold Phoslo.    Monitor labs.    Encourage ambulation.    Nephrology considering prolia, however patient's wife would like to hold off for now.    History PEA arrest   PEA arrest prior to his previous admission and 1 episode of PEA associated with desaturation on 12/20.  No structural cardiac disease.     Stable, continue cardiac Monitoring.     Chronic Hypotension  In the past has developed baseline hypotension with cirrhosis and prolonged critical illness.  On hemodialysis.  Receiving midodrine and albumin during dialysis.    Continue midodrine, as per nephrology.      History of seizure   After hypoxic event, in the context of baseline multifactorial encephalopathy secondary to his ongoing critical illness and prior cardiac arrests and prior strokes and cirrhosis with hepatic encephalopathy. MRI of head of 12/20/22 reveals no PRES or leptomeningeal enhancement but scattered.  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   No recent seizure activity.    Continue levetiracetam, lacosamide.    Seizure precautions.    Outpatient follow-up with neurology, consult inpatient if needed.     ESRD  Anemia due to renal disease  Hypotension during dialysis  Hyperphosphatemia    As per nephrology.    Nephrology reviewing vein mapping to assess options for internal access placement for dialysis, see note 3/16.    Underwent Fistula placement on  3/21/22.    Vascular surgery following-continue to use for tunnel catheter.  Outpatient follow-up with vascular surgery and for will need a follow-up ultrasound of the fistula in 6 weeks to assess patency.    Started sevalmer 800mg TID with  meals on 3/27/23.    Complained of cramping with dialysis. Tried a dose of oxycodone prior to dialysis but this was not helpful. He will discuss the cramping with nephrology.     Diabetes mellitus type 2  *Hemoglobin A1c on November 2022 was 4.7  *By report, on Lantus insulin in the past.  Blood sugar checks and sliding scale insulin previously discontinued as has been stable without insulin needs.    Monitor with periodic blood sugar checks as needed.    Hypomagnesemia    Resolved with replacement.     Severe malnutrition, protein and calorie type  Moderate dysphagia  G-tube feedings    Continue with tube feeds via PEG tube.    IR replaced the PEG tube on 2/8/2023, monitor.    Nutritionist consulted and following for tube feeds.    SLP following as well. Oral diet as per speech and language therapy (SLP). Patient hopeful to advance to thin liquids soon.    2/20 Nutrition following for tube feeds.    Now undergoing PRN tube feeds.    Encourage high-protein diet.    Nutrition notes reviewed, patient eating 0 to 100% of meals.  Continue current interventions.  As needed tube feed boluses available if patient consumes less than 50% of the meal.    Family encouraging patient to eat high protein diet including protein bars.     Anxiety  Fluoxetine was discontinued as per psychiatry recommendation on 2/2 (see consult note for details).    Stable, monitor; comfort and reassurance offered during visit.    Psychiatry follow-up.    Restless leg  Syndrome    Patient started on ropiranole by neurology.       Diet: Room Service  Snacks/Supplements Adult: Other; Gelatein+ with brkfst and lunch, and magic cup with dinner (RD); With Meals  Combination Diet Regular Diet; Mildly Thick (level 2) (OK for fresh fruit (e.g., pineapple) per SLP)  Adult Formula Bolus Feeding: Novasource Renal; Route: Gastrostomy; 3 times daily; Volume per Bolus: 240; mL(s); Back up bolus for when pt consumes <50% of meals: 80 mL/hr x 3 hrs    DVT  Prophylaxis: DOAC  Nixon Catheter: Not present  Lines: PRESENT      CVC Double Lumen Right External jugular Tunneled-Site Assessment: WDL  Hemodialysis Vascular Access Arteriovenous fistula Left Forearm-Site Assessment: WDL;Bruit present;Thrill present      Cardiac Monitoring: None  Code Status: Full Code      Clinically Significant Risk Factors           # Hypercalcemia: Highest Ca = 10.2 mg/dL in last 2 days, will monitor as appropriate    # Hypoalbuminemia: Lowest albumin = 1.9 g/dL at 1/23/2023  6:38 AM, will monitor as appropriate   # Thrombocytopenia: Lowest platelets = 97 in last 2 days, will monitor for bleeding          # Severe Malnutrition: based on nutrition assessment        Disposition Plan     Expected Discharge Date: 04/12/2023, 12:00 PM  Discharge Delays: Placement - LTC  Destination: inpatient rehabilitation facility  Discharge Comments: Dialysis pt MWF. Will need placement TCU/LTC. [facility] willing to accept once no sitter, SW to follow up when sitter free          Franky Monet MD  Hospitalist Service  [facility]  Securely message with [employer] (more info)  Text page via [employer] Paging/Directory   ______________________________________________________________________    Interval History      Blanco Osborne was seen this morning.  Still needing sitter  No CP/SOB  No fevers recorded  No nausea / vomiting. Asking when he can be off dialysis.   No new complaints    Physical Exam   Vital Signs: Temp: (!) 96.6  F (35.9  C) Temp src: Oral BP: 106/60 Pulse: 71   Resp: 20 SpO2: 91 % O2 Device: None (Room air)    Weight: 180 lbs 15.96 oz    Constitutional: no apparent distress  Respiratory: no increased work of breathing, clear to auscultation bilaterally, no crackles or wheezing  Cardiovascular: regular rate and rhythm, normal S1 and S2, no murmur noted  GI: normal bowel sounds, soft, non-distended, non-tender  Skin: warm, dry  Musculoskeletal: no lower extremity pitting edema  present  Neurologic: awake, alert, appropriate in conversation, moves all extremities    Medical Decision Making       40 MINUTES SPENT BY ME on the date of service doing chart review, history, exam, documentation & further activities per the note.      Data     I have personally reviewed the following data over the past 24 hrs:    N/A  \   N/A   / N/A     137 99 47.1 (H) /  103 (H)   3.8 25 4.89 (H) \       ALT: N/A AST: N/A AP: N/A TBILI: N/A   ALB: 3.7 TOT PROTEIN: N/A LIPASE: N/A       Procal: N/A CRP: N/A Lactic Acid: 1.3

## 2023-04-06 NOTE — PROGRESS NOTES
CLINICAL NUTRITION SERVICES - REASSESSMENT NOTE    Future/Additional Recommendations:     - Continue supplements, Room service assistance     - Pt should still be getting a back up TF bolus if he eats <50% of meals. Flush 60 mL before and after bolus.      Novasource Renal at 80 mL/hr x 3 hours PRN back up bolus if consumes <50%.   Per Bolus Provisions: 240 mL formula = 480 kcal, 22 g protein, 44 g CHO, 0 g fiber and 172 mL free water     Malnutrition: (3/16)  % Weight Loss:  > 5% in 1 month (severe malnutrition)   % Intake:  No longer applies   Subcutaneous Fat Loss:  Orbital region moderate depletion, Upper arm region moderate-severe depletion and Thoracic region moderate-severe depletion  Muscle Loss:  Temporal region moderate depletion, Clavicle bone region severe depletion, Acromion bone region severe depletion, Patellar region moderate depletion and Anterior thigh region moderate-severe depletion  Fluid Retention:  Trace     Malnutrition Diagnosis: Severe malnutrition  In Context of:  Acute on Chronic illness or disease     EVALUATION OF PROGRESS TOWARD GOALS   Diet:  Regular Diet + Mildly thick liquids  Snacks: Gelatein+ w/ breakfast and lunch; Magic cup w/ Dinner  Supplements PRN as well.   Room service w/ assist     Nutrition Support:   Back up bolus only (Has not been utilized). Novasource Renal at 80 mL/hr x 3 hours PRN back up bolus if consumes <50%.   Per Bolus Provisions: 240 mL formula = 480 kcal, 22 g protein, 44 g CHO, 0 g fiber and 172 mL free water  Free Water Flush: 100 mL before and after each feeding    Intake/Tolerance:   - 50-% intakes recorded. Meals received are adequate.   - Documented refusal of TF.     - Labs: Phos 5.6 (H)  - Stooling: Stooling 4-5x daily.  - Weight: wt up to 82.1 kg     ASSESSED NUTRITION NEEDS:  Dosing Weight (3/29) 76.1 kg   Estimated Energy Needs: 9491-3672+ kcals (25-30+ Kcal/Kg)  Justification: dialysis  Estimated Protein Needs: + grams protein  (1.2-1.5+ g pro/Kg)  Justification: dialysis    NEW FINDINGS:   - May be able to discharge pending placement once sitter free   - continues on dialysis MWF    Previous Goals:   Intake of >/=75% meals + supplements on average  Evaluation: Not met, % of meals documented     Previous Nutrition Diagnosis:   Inadequate oral intake related to fluctuations in appetite, prolonged admission as evidenced by more often eating 0-50% of meals the past several days  Evaluation: Improving    CURRENT NUTRITION DIAGNOSIS  Predicted inadequate nutrient intake (energy/protein) related to fluctuating intakes, lately pt consuming % of adequate trays.      INTERVENTIONS  Recommendations / Nutrition Prescription  - Continue supplements, Room service assistance     - Pt should still be getting a back up TF bolus if he eats <50% of meals. Flush 60 mL before and after bolus.     Implementation  Nothing new today    Goals  Intake of >/=75% meals + supplements on average.     MONITORING AND EVALUATION:  Progress towards goals will be monitored and evaluated per protocol and Practice Guidelines    Edna Osuna RD, LD  [facility]  Pager: [phone]  Weekend Pager: [phone]

## 2023-04-06 NOTE — PLAN OF CARE
DATE & TIME: 4/5/23  [id number]  Cognitive Concerns/ Orientation: A&O x2  Disoriented to time and situation with confusion at times; confused with his demands  BEHAVIOR & AGGRESSION TOOL COLOR: Green  but irritated with the presence of sitter in the room         ABNL VS/O2: refused BP checks   MOBILITY: Assist x 1 gait belt/walker. 10 lb weight bear limit on L arm d/t new fistula;  PAIN MANAGMENT: Denies  DIET: Regular diet, mildly thickened liquids (level 2)  BOWEL/BLADDER:  continent  ABNL LAB/BG: NA  DRAIN/DEVICES: R chest CVC, double lumen for hemodialysis; R. PIV; PEG tube clamped; L fistula placed 3/21/23 with strong bruit/thrill present. Sutures open to air and CDI, no redness or swelling, no pain at site per pt report.  SKIN: Scattered bruising; L forearm skin tear covered; L fistula site with sutures intact; dry scaly skin  PROCEDURES: Dialysis MWF   D/C DATE: Pending placement    OTHER IMPORTANT INFO: Sitter remains at bedside for confusion/impulsiveness; family members were at the bedside too.

## 2023-04-06 NOTE — PLAN OF CARE
Goal Outcome Evaluation:      Plan of Care Reviewed With: patient    Overall Patient Progress: improvingOverall Patient Progress: improving    Outcome Evaluation: % intakes documented.

## 2023-04-07 LAB
ALBUMIN SERPL BCG-MCNC: 3.6 G/DL (ref 3.5–5.2)
ANION GAP SERPL CALCULATED.3IONS-SCNC: 14 MMOL/L (ref 7–15)
BUN SERPL-MCNC: 36.2 MG/DL (ref 6–20)
CALCIUM SERPL-MCNC: 9.5 MG/DL (ref 8.6–10)
CHLORIDE SERPL-SCNC: 97 MMOL/L (ref 98–107)
CREAT SERPL-MCNC: 3.59 MG/DL (ref 0.67–1.17)
DEPRECATED HCO3 PLAS-SCNC: 28 MMOL/L (ref 22–29)
GFR SERPL CREATININE-BSD FRML MDRD: 19 ML/MIN/1.73M2
GLUCOSE SERPL-MCNC: 119 MG/DL (ref 70–99)
PHOSPHATE SERPL-MCNC: 4.2 MG/DL (ref 2.5–4.5)
POTASSIUM SERPL-SCNC: 3.6 MMOL/L (ref 3.4–5.3)
SODIUM SERPL-SCNC: 139 MMOL/L (ref 136–145)

## 2023-04-07 PROCEDURE — 82040 ASSAY OF SERUM ALBUMIN: CPT | Performed by: STUDENT IN AN ORGANIZED HEALTH CARE EDUCATION/TRAINING PROGRAM

## 2023-04-07 PROCEDURE — 634N000001 HC RX 634: Performed by: INTERNAL MEDICINE

## 2023-04-07 PROCEDURE — 250N000013 HC RX MED GY IP 250 OP 250 PS 637: Performed by: STUDENT IN AN ORGANIZED HEALTH CARE EDUCATION/TRAINING PROGRAM

## 2023-04-07 PROCEDURE — 90935 HEMODIALYSIS ONE EVALUATION: CPT | Performed by: INTERNAL MEDICINE

## 2023-04-07 PROCEDURE — 120N000001 HC R&B MED SURG/OB

## 2023-04-07 PROCEDURE — 250N000013 HC RX MED GY IP 250 OP 250 PS 637: Performed by: HOSPITALIST

## 2023-04-07 PROCEDURE — 36415 COLL VENOUS BLD VENIPUNCTURE: CPT | Performed by: STUDENT IN AN ORGANIZED HEALTH CARE EDUCATION/TRAINING PROGRAM

## 2023-04-07 PROCEDURE — 90937 HEMODIALYSIS REPEATED EVAL: CPT

## 2023-04-07 PROCEDURE — 250N000013 HC RX MED GY IP 250 OP 250 PS 637: Performed by: PSYCHIATRY & NEUROLOGY

## 2023-04-07 PROCEDURE — 250N000011 HC RX IP 250 OP 636: Performed by: INTERNAL MEDICINE

## 2023-04-07 PROCEDURE — 99231 SBSQ HOSP IP/OBS SF/LOW 25: CPT | Performed by: STUDENT IN AN ORGANIZED HEALTH CARE EDUCATION/TRAINING PROGRAM

## 2023-04-07 PROCEDURE — 250N000012 HC RX MED GY IP 250 OP 636 PS 637: Performed by: STUDENT IN AN ORGANIZED HEALTH CARE EDUCATION/TRAINING PROGRAM

## 2023-04-07 RX ORDER — ALBUMIN (HUMAN) 12.5 G/50ML
50 SOLUTION INTRAVENOUS
Status: DISCONTINUED | OUTPATIENT
Start: 2023-04-07 | End: 2023-04-10

## 2023-04-07 RX ADMIN — GUAIFENESIN 600 MG: 600 TABLET, EXTENDED RELEASE ORAL at 20:19

## 2023-04-07 RX ADMIN — RAMELTEON 8 MG: 8 TABLET, FILM COATED ORAL at 20:19

## 2023-04-07 RX ADMIN — TACROLIMUS 1 MG: 1 CAPSULE ORAL at 20:19

## 2023-04-07 RX ADMIN — Medication 1 CAPSULE: at 13:08

## 2023-04-07 RX ADMIN — APIXABAN 5 MG: 5 TABLET, FILM COATED ORAL at 13:08

## 2023-04-07 RX ADMIN — LACOSAMIDE 100 MG: 100 TABLET, FILM COATED ORAL at 00:00

## 2023-04-07 RX ADMIN — EPOETIN ALFA-EPBX 4000 UNITS: 4000 INJECTION, SOLUTION INTRAVENOUS; SUBCUTANEOUS at 10:22

## 2023-04-07 RX ADMIN — MIDODRINE HYDROCHLORIDE 10 MG: 5 TABLET ORAL at 13:08

## 2023-04-07 RX ADMIN — LACTULOSE 10 G: 10 SOLUTION ORAL at 13:08

## 2023-04-07 RX ADMIN — SEVELAMER CARBONATE 800 MG: 800 TABLET, FILM COATED ORAL at 17:47

## 2023-04-07 RX ADMIN — RIFAXIMIN 550 MG: 550 TABLET ORAL at 13:12

## 2023-04-07 RX ADMIN — ACETAMINOPHEN 650 MG: 325 TABLET, FILM COATED ORAL at 21:57

## 2023-04-07 RX ADMIN — ACETAMINOPHEN 650 MG: 325 TABLET, FILM COATED ORAL at 17:48

## 2023-04-07 RX ADMIN — ROPINIROLE HYDROCHLORIDE 0.5 MG: 0.25 TABLET, FILM COATED ORAL at 21:58

## 2023-04-07 RX ADMIN — LEVETIRACETAM 1000 MG: 500 TABLET, FILM COATED ORAL at 21:58

## 2023-04-07 RX ADMIN — PANTOPRAZOLE SODIUM 40 MG: 40 TABLET, DELAYED RELEASE ORAL at 13:07

## 2023-04-07 RX ADMIN — TACROLIMUS 1 MG: 1 CAPSULE ORAL at 13:08

## 2023-04-07 RX ADMIN — SEVELAMER CARBONATE 800 MG: 800 TABLET, FILM COATED ORAL at 13:07

## 2023-04-07 RX ADMIN — MIDODRINE HYDROCHLORIDE 10 MG: 5 TABLET ORAL at 07:31

## 2023-04-07 RX ADMIN — GUAIFENESIN 600 MG: 600 TABLET, EXTENDED RELEASE ORAL at 13:08

## 2023-04-07 RX ADMIN — LACOSAMIDE 100 MG: 100 TABLET, FILM COATED ORAL at 13:07

## 2023-04-07 RX ADMIN — OLANZAPINE 5 MG: 5 TABLET, ORALLY DISINTEGRATING ORAL at 21:58

## 2023-04-07 RX ADMIN — LACTULOSE 10 G: 10 SOLUTION ORAL at 20:19

## 2023-04-07 RX ADMIN — FOLIC ACID 1 MG: 1 TABLET ORAL at 13:08

## 2023-04-07 RX ADMIN — MIDODRINE HYDROCHLORIDE 10 MG: 5 TABLET ORAL at 17:47

## 2023-04-07 RX ADMIN — RIFAXIMIN 550 MG: 550 TABLET ORAL at 20:19

## 2023-04-07 RX ADMIN — ACETAMINOPHEN 650 MG: 325 TABLET, FILM COATED ORAL at 07:31

## 2023-04-07 RX ADMIN — LACOSAMIDE 100 MG: 100 TABLET, FILM COATED ORAL at 23:54

## 2023-04-07 RX ADMIN — OLANZAPINE 5 MG: 5 TABLET, ORALLY DISINTEGRATING ORAL at 13:16

## 2023-04-07 RX ADMIN — OLANZAPINE 5 MG: 5 TABLET, ORALLY DISINTEGRATING ORAL at 07:31

## 2023-04-07 RX ADMIN — APIXABAN 5 MG: 5 TABLET, FILM COATED ORAL at 20:19

## 2023-04-07 RX ADMIN — IRON SUCROSE 100 MG: 20 INJECTION, SOLUTION INTRAVENOUS at 10:22

## 2023-04-07 ASSESSMENT — ACTIVITIES OF DAILY LIVING (ADL)
ADLS_ACUITY_SCORE: 54

## 2023-04-07 NOTE — PROGRESS NOTES
Potassium   Date Value Ref Range Status   04/06/2023 3.8 3.4 - 5.3 mmol/L Final   12/29/2022 3.4 3.4 - 5.3 mmol/L Final   04/20/2020 3.9 3.4 - 5.3 mmol/L Final     Potassium POCT   Date Value Ref Range Status   01/19/2023 3.6 3.5 - 5.0 mmol/L Final     Hemoglobin   Date Value Ref Range Status   04/05/2023 8.4 (L) 13.3 - 17.7 g/dL Final   04/20/2020 14.3 13.3 - 17.7 g/dL Final     Creatinine   Date Value Ref Range Status   04/06/2023 4.89 (H) 0.67 - 1.17 mg/dL Final   04/20/2020 1.06 0.66 - 1.25 mg/dL Final     Urea Nitrogen   Date Value Ref Range Status   04/06/2023 47.1 (H) 6.0 - 20.0 mg/dL Final   12/29/2022 46 (H) 7 - 30 mg/dL Final   04/20/2020 23 7 - 30 mg/dL Final     Sodium   Date Value Ref Range Status   04/06/2023 137 136 - 145 mmol/L Final   04/20/2020 139 133 - 144 mmol/L Final     INR   Date Value Ref Range Status   12/21/2022 1.36 (H) 0.85 - 1.15 Final   10/07/2019 1.20 (H) 0.86 - 1.14 Final       DIALYSIS PROCEDURE NOTE  Hepatitis status of previous patient on machine log was checked and verified ok to use with this patients hepatitis status.  Patient dialyzed for 3 hrs. on a K3 bath with a net fluid removal of 2L.  A BFR of 350 ml/min was obtained via a RIJ catheter.   The treatment plan was discussed with  during the treatment.    Total heparin received during the treatment: 0 units.  Line flushed, clamped and capped with heparin 1:1000 1.9 mL (1900 units) per lumen     Meds  given: Epoetin 4,000units and 100mg of venofer given     Complications: None, CVC was without issues this run.     Person educated: patient. Knowledge base substantial. Barriers to learning: forgetful. Educated on access care., medication and procedure via verbal mode. Patient verbalized understanding. Pt prefers verbal education style.      ICEBOAT? Timeout performed pre-treatment  I: Patient was identified using 2 identifiers  C:  Consent Signed Yes  E: Equipment preventative maintenance is current and dialysis  delivery system OK to use  B: Hepatitis B Surface Antigen: Neg; Draw Date: 3/9/2023      Hepatitis B Surface Antibody: Susceptible; Draw Date: 3/9/2023  O: Dialysis orders present and complete prior to treatment  A: Vascular access verified and assessed prior to treatment  T: Treatment was performed at a clinically appropriate time  ?: Patient was allowed to ask questions and address concerns prior to treatment  See Adult Hemodialysis flowsheet in [facility] for further details and post assessment.  Machine water alarm in place and functioning. Transducer pods intact and checked every 15min.   Pt returned via bed transport.  Chlorine/Chloramine water system checked every 4 hours.  Outpatient Dialysis TBD     Patient repositioned every 2 hours during the treatment.  Post treatment report given to NIA Hurley RN regarding 2L of fluid removed and last BP of 115/70.

## 2023-04-07 NOTE — PLAN OF CARE
DATE & TIME: 04/06/2023 Night  Cognitive Concerns/ Orientation: Disoriented to time/situation, with increased confusion at times; can be impulsive at times  BEHAVIOR & AGGRESSION TOOL COLOR: Green        ABNL VS/O2: Declined vitals overnight; room air  MOBILITY: Assist-1 gait belt/walker; 10 lb weight bear limit on L arm d/t new fistula  PAIN MANAGMENT: Denies  DIET: Regular diet, mildly thickened liquids (level 2); takes pills without difficulty  BOWEL/BLADDER: BM this shift; pt on hemodialysis  ABNL LAB/BG: LA 1.3  DRAIN/DEVICES: R chest CVC, double lumen for hemodialysis; R PIV saline locked; PEG tube clamped; L fistula placed 3/21/23 with strong bruit/thrill present--Sutures open to air and CDI, no redness/swelling, pt denies pain at site  SKIN: Scattered bruising; L forearm skin tear covered; L fistula site with sutures intact; dry scaly skin  PROCEDURES: Dialysis MWF  D/C DATE: Pending placement to TCU/LTC   OTHER IMPORTANT INFO: Nephrology following

## 2023-04-07 NOTE — PLAN OF CARE
DATE & TIME: 04/07/23 Day shift   Cognitive Concerns/ Orientation: Disoriented to time/situation, with increased confusion at times; seems to do better with zyprexa  BEHAVIOR & AGGRESSION TOOL COLOR: Green        ABNL VS/O2: VSS on RA  MOBILITY: Assist-1 gait belt/walker; 10 lb weight bear limit on L arm d/t new fistula; walked around unit well  PAIN MANAGMENT: Denies  DIET: Regular diet, mildly thickened liquids (level 2); takes pills without difficulty  BOWEL/BLADDER: No BM this shift; pt on hemodialysis  ABNL LAB/BG:   DRAIN/DEVICES: R chest CVC, double lumen for hemodialysis; R PIV saline locked; PEG tube clamped; L fistula placed 3/21/23 with strong bruit/thrill present--Sutures taken out- no redness/swelling, pt denies pain at site  SKIN: Scattered bruising; L forearm skin tear covered; L fistula site with sutures intact; dry scaly skin  PROCEDURES: Dialysis MWF- had dialysis today; feels great  D/C DATE: Pending placement to TCU/LTC  OTHER IMPORTANT INFO: Nephrology following

## 2023-04-07 NOTE — PROGRESS NOTES
Assessment and Plan:   ESRD: Running with R CVC, 400 BFR, 3h, 2: UF. 3K 35 HCO3 and 140 Na. No heparin (on apixaban), midodrine tid.   Mild hypercalcemia: on renvela as phos binder.     Cont dialysis MWF.             Interval History:   Pancytopenia:  WBC 3.6, Hgb 8.4, plt 97. EPO and venofer on dialysis.    Hx of liver transplant: on tacrolimus.               Review of Systems:   Feels ok on dialysis. No cramping today.           Medications:       - MEDICATION INSTRUCTIONS for Dialysis Patients -   Does not apply See Admin Instructions     sodium chloride 0.9%  250 mL Intravenous Once in dialysis/CRRT     sodium chloride 0.9%  300 mL Hemodialysis Machine Once     apixaban ANTICOAGULANT  5 mg Oral BID     folic acid  1 mg Oral or Feeding Tube Daily     guaiFENesin  600 mg Oral BID     sodium chloride (PF) 0.9%  10 mL Intracatheter Once in dialysis/CRRT    Followed by     heparin  1.3-2.6 mL Intracatheter Once in dialysis/CRRT     sodium chloride (PF) 0.9%  10 mL Intracatheter Once in dialysis/CRRT    Followed by     heparin  1.3-2.6 mL Intracatheter Once in dialysis/CRRT     lacosamide  100 mg Oral Q12H     lactulose  10 g Oral BID     levETIRAcetam  1,000 mg Oral Q24H     lidocaine   Transdermal Q8H BIJU     menthol   Transdermal Q8H     midodrine  10 mg Oral or Feeding Tube TID w/meals     mineral oil-hydrophilic petrolatum   Topical Daily     multivitamin RENAL  1 capsule Oral Daily     - MEDICATION INSTRUCTIONS -   Does not apply Once     OLANZapine zydis  5 mg Oral At Bedtime     OLANZapine zydis  5 mg Oral Once per day on Mon Wed Fri     pantoprazole  40 mg Oral QAM AC     ramelteon  8 mg Oral QPM     rifaximin  550 mg Oral or Feeding Tube BID     rOPINIRole  0.5 mg Oral At Bedtime     sevelamer carbonate  800 mg Oral TID w/meals     sodium chloride (PF)  3 mL Intracatheter Q8H     sodium chloride (PF)  9 mL Intracatheter During Dialysis/CRRT (from stock)     sodium chloride (PF)  9 mL Intracatheter  During Dialysis/CRRT (from stock)     tacrolimus  1 mg Oral Q12H       - MEDICATION INSTRUCTIONS -       - MEDICATION INSTRUCTIONS -       Current active medications and PTA medications reviewed, see medication list for details.            Physical Exam:   Vitals were reviewed  Patient Vitals for the past 24 hrs:   BP Temp Temp src Pulse Resp SpO2   23 1045 105/62 -- -- 76 18 --   23 1030 102/55 -- -- 71 27 --   23 1015 103/65 -- -- 71 25 --   23 1000 98/65 -- -- 77 26 --   23 0945 102/58 -- -- 75 15 --   23 0930 115/69 -- -- 75 24 --   23 0915 104/79 -- -- 69 24 --   23 0900 113/66 -- -- 70 24 --   23 0850 109/80 -- -- 74 25 --   23 0845 109/80 -- -- 76 (!) 6 --   23 0830 93/60 -- -- 73 (!) 5 --   23 0815 97/54 -- -- 74 (!) 6 --   23 0800 108/68 -- -- 69 27 --   23 0747 110/63 -- -- 72 (!) 32 --   23 0745 110/63 -- -- 79 27 --   23 0740 113/72 97.6  F (36.4  C) Oral 102 25 --   23 1844 -- 97.6  F (36.4  C) Oral -- -- --   23 1814 117/78 -- -- 70 16 99 %       Temp:  [97.6  F (36.4  C)] 97.6  F (36.4  C)  Pulse:  [] 76  Resp:  [5-32] 18  BP: ()/(54-80) 105/62  SpO2:  [99 %] 99 %    Temperatures:  Current - Temp: 97.6  F (36.4  C); Max - Temp  Av.6  F (36.4  C)  Min: 97.6  F (36.4  C)  Max: 97.6  F (36.4  C)  Respiration range: Resp  Av.7  Min: 5  Max: 32  Pulse range: Pulse  Av.9  Min: 69  Max: 102  Blood pressure range: Systolic (24hrs), Av , Min:93 , Max:117   ; Diastolic (24hrs), Av, Min:54, Max:80    Pulse oximetry range: SpO2  Av %  Min: 99 %  Max: 99 %    I/O last 3 completed shifts:  In: 140 [P.O.:140]  Out: 1 [Stool:1]      Intake/Output Summary (Last 24 hours) at 2023 1050  Last data filed at 2023 0659  Gross per 24 hour   Intake 140 ml   Output 1 ml   Net 139 ml       Alert and responsive  R CVC with no redness or tenderness  Lungs with clear BS  Cor  RRR nl S1 S2 no M  LE no edema  Abd FT in place.       Wt Readings from Last 4 Encounters:   03/31/23 82.1 kg (181 lb)   01/21/23 82.4 kg (181 lb 11.2 oz)   12/28/22 96 kg (211 lb 10.3 oz)   12/16/22 100.2 kg (221 lb)          Data:          Lab Results   Component Value Date     04/06/2023     04/05/2023     04/04/2023     04/20/2020     10/07/2019     02/20/2019    Lab Results   Component Value Date    CHLORIDE 99 04/06/2023    CHLORIDE 95 04/05/2023    CHLORIDE 99 04/04/2023    CHLORIDE 103 12/29/2022    CHLORIDE 103 12/28/2022    CHLORIDE 102 12/27/2022    CHLORIDE 105 08/05/2020    CHLORIDE 106 04/20/2020    CHLORIDE 99 10/07/2019    CHLORIDE 108 08/01/2019    CHLORIDE 106 02/20/2019    Lab Results   Component Value Date    BUN 47.1 04/06/2023    BUN 66.0 04/05/2023    BUN 50.1 04/04/2023    BUN 46 12/29/2022    BUN 62 12/28/2022    BUN 52 12/27/2022    BUN 23 04/20/2020    BUN 18 10/07/2019    BUN 20 02/20/2019      Lab Results   Component Value Date    POTASSIUM 3.8 04/06/2023    POTASSIUM 4.0 04/05/2023    POTASSIUM 4.0 04/04/2023    POTASSIUM 3.6 01/19/2023    POTASSIUM  01/18/2023      Comment:      Disregard results.      POTASSIUM 3.6 01/17/2023    POTASSIUM 3.4 12/29/2022    POTASSIUM 3.7 12/28/2022    POTASSIUM 3.7 12/28/2022    POTASSIUM 3.9 04/20/2020    POTASSIUM 4.3 10/07/2019    POTASSIUM 4.2 02/20/2019    Lab Results   Component Value Date    CO2 25 04/06/2023    CO2 24 04/05/2023    CO2 26 04/04/2023    CO2 31 12/29/2022    CO2 29 12/28/2022    CO2 29 12/27/2022    CO2 28 04/20/2020    CO2 26 10/07/2019    CO2 24 02/20/2019    Lab Results   Component Value Date    CR 4.89 04/06/2023    CR 5.97 04/05/2023    CR 5.10 04/04/2023    CR 1.06 04/20/2020    CR 1.01 10/07/2019    CR 1.08 02/20/2019        Recent Labs   Lab Test 04/05/23  0959 04/03/23  1121 03/30/23  0859   WBC 3.6* 3.3* 3.4*   HGB 8.4* 9.9* 8.4*   HCT 28.2* 31.8* 28.3*   MCV 99 96 100   PLT 97* 118*  102*     Recent Labs   Lab Test 04/05/23  0959 03/25/23  1150 03/14/23  1033 03/13/23  1454 03/12/23  1116 03/11/23  0846 03/10/23  1457   AST 16  --  20 21  --    < > 23   ALT <5*  --  8* 9*  --    < > 9*   ALKPHOS 184*  --  177* 182*  --    < > 231*   BILITOTAL 0.4  --  0.4 0.4  --    < > 0.4   CHANDLER  --  69*  --   --  64*  --  30    < > = values in this interval not displayed.       Recent Labs   Lab Test 04/04/23  0837 04/03/23  1121 04/01/23  1027   MAG 2.0 1.8 2.0     Recent Labs   Lab Test 04/06/23  0808 04/05/23  0959 04/04/23  0837   PHOS 5.6* 6.9* 6.9*     Recent Labs   Lab Test 04/06/23  0808 04/05/23  0959 04/04/23  0837   KELLY 10.2* 10.2* 10.2*       Lab Results   Component Value Date    KELLY 10.2 (H) 04/06/2023     Lab Results   Component Value Date    WBC 3.6 (L) 04/05/2023    HGB 8.4 (L) 04/05/2023    HCT 28.2 (L) 04/05/2023    MCV 99 04/05/2023    PLT 97 (L) 04/05/2023     Lab Results   Component Value Date     04/06/2023    POTASSIUM 3.8 04/06/2023    CHLORIDE 99 04/06/2023    CO2 25 04/06/2023     (H) 04/06/2023     Lab Results   Component Value Date    BUN 47.1 (H) 04/06/2023    CR 4.89 (H) 04/06/2023     Lab Results   Component Value Date    MAG 2.0 04/04/2023     Lab Results   Component Value Date    PHOS 5.6 (H) 04/06/2023       Creatinine   Date Value Ref Range Status   04/06/2023 4.89 (H) 0.67 - 1.17 mg/dL Final   04/05/2023 5.97 (H) 0.67 - 1.17 mg/dL Final   04/04/2023 5.10 (H) 0.67 - 1.17 mg/dL Final   04/03/2023 3.51 (H) 0.67 - 1.17 mg/dL Final   04/02/2023 5.28 (H) 0.67 - 1.17 mg/dL Final   04/01/2023 4.60 (H) 0.67 - 1.17 mg/dL Final   04/20/2020 1.06 0.66 - 1.25 mg/dL Final   10/07/2019 1.01 0.66 - 1.25 mg/dL Final   02/20/2019 1.08 0.66 - 1.25 mg/dL Final   07/10/2018 1.32 (H) 0.66 - 1.25 mg/dL Final   10/31/2017 1.18 0.66 - 1.25 mg/dL Final   10/17/2017 1.26 (H) 0.66 - 1.25 mg/dL Final       Attestation:  I have reviewed today's vital signs, notes, medications, labs and  imaging.  Seen on dialysis.      Elvis Mariano MD

## 2023-04-07 NOTE — PROGRESS NOTES
[facility]    Medicine Progress Note - Hospitalist Service    Date of Admission:  1/21/2023  Date of Service: 04/07/2023    Assessment & Plan      Blanco Osborne is a 57 year-old male admitted on 1/21/2023 as a transfer from [facility] for evaluation of loculated pleural effusion. He has extensive PMH including h/o liver transplant 2016 due to alcohol abuse, pAF on chronic anticoagulation, history of diabetes mellitus type 2, CVA, hypertension, CHRISTIAN on BiPAP.  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 He was recovering in [facility] and was transferred to [facility] for consideration of chest tube placement and possible intrapleural lysis for worsening effusion.     Acute metabolic encephalopathy with delirium, multifactorial, slowly improving?  Hepatic encephalopathy  Chronic liver disease, history of liver transplant 2016  End-stage renal disease (ESRD), on hemodialysis (HD)  History of seizure, on Keppra and Vimpat  Waxing and waning mentation, coinciding with lactulose being held at [facility]  Earlier in hospital stay, remained confused and had pulled out tracheostomy tube, PICC line and pulled his dialysis catheter.  Lines replaced.  Psychiatry consulted, recent follow-up 2/21, 2/28, see notes.    Mental status continues to fluctuate with periods of alertness and increased sleepiness, overall improving.    Complicated, prolonged hospital course with mental status concerns multifactorial related to hepatic encephalopathy, end-stage renal disease on dialysis, past PEA arrest, seizure disorder, medication, and chronic liver disease with prior liver transplant.  As mental status fluctuates will continue to require one-to-one sitter as needed for added safety and supervision.  Reassess  daily.    Continue to reorient and redirect as able.  Avoid restraints if possible with goals of safety and close supervision (given multiple lines and tubes - dialysis catheter, PEG tube, IV, etc).    Maintain normal day/night cycles and sleep-wake cycles.  Minimize sedating medications as able.  Remains weak and deconditioned with complex, prolonged hospital course and limited mobility.  Encouragement and support offered daily to patient.    Continue Lactulose to 10 mg BID, monitor for symptoms; goal to have 2-3 bowel movements per day.    Ongoing hemodialysis, as per nephrology, 3X/week dialysis schedule.    Nephrology following    Continue ramelteon 8 mg at bedtime; monitor for side effects including AM sedation, reassess daily.    Started on olanzapine 5 mg BID and PRN earlier during this hospitalization. Delirium improved, discontinued AM dose of olanzapine on non-dialysis days.    Seizure disorder stable, continue Keppra and Vimpat; monitor, no recurrent seizures reported of recent.    Reconsult psychiatry as needed.  Neurology consult appreciated.  Patient has been started on ropinirole for restless leg syndrome.     Bilateral pleural effusions   Acute respiratory failure requiring tracheostomy  - resolved    Pneumonia  - resolved   ARDS  - resolved    Right pneumothorax - resolved   *Initial event was parainfluenza and strep pneumo pneumonia back in November 2022. Treated for ARDS. Had failed extubation x2 and tracheostomy performed on previous hospitalization. *Had additional complications of bilateral pneumothoraces as well as hydropneumothorax- pleural fluid grew candida parapsilosis  *Completed 4-week antifungal treatment. While in LTACH he was successfully weaned from the ventilator.  *Recently there were concern for worsening effusion while at LTACH and had thoracentesis 01/13 which returned exudative without growth on cultures. CT chest/abdomen/pelvis on 01/18 demonstrated worsening effusions and  concerns for infected parapneumonic effusions with possible SBP.  Multiple pulmonologist at LTACH reviewed CT scan recommended transfer to [facility] for consideration of chest tube placement and possible intrapleural lysis  *Thoracic surgery (Dr. Jackson) consulted during admission   *CT chest 1/22, small effusions on imaging and so chest tube was not inserted.   ID consulted, see note 2/10.  *Patient pulled out his tracheostomy tube on the night 2/1-2/2 and is tolerating well without increased shortness of breath persistent cough or worsening hypoxia.  * Chest x-ray 2/3 with cardiomegaly, chronic small bilateral pleural effusions, no pulmonary edema; right internal jugular dialysis catheter in place.   3/12 Pulmonary consult, see note, concerns of hypercapnia, atelectasis, with consideration of BiPAP trial; no recommendations for antibiotics with infiltrates felt to be atelectasis.    Stable, continue to monitor respiratory status, recently stable on room air; occasional cough reported.    Encourage incentive spirometry as able, up to chair, deep breathing.    Wound care previously consulted for tracheostomy site care, monitor for signs and symptoms of infection, healing well - resolved.    Encourage ambulation.    Possible splenic infract  Wedge shaped area on CT scan chest and CT abdomen on 3/7/23.    Hematology consulted, suspicion for infarct was low.    TTE no thrombus or vegetations.    No abdominal pain CTM.     S/p liver transplant 2016   Chronic immunosuppression, on tacrolimus  Cirrhosis with ascites  Subacute bacterial peritonitis (SBP), resolved  *Main manifestations of this are hepatic encephalopathy and ascites.  Hepatologist at [place] felt that most likely reason for the cirrhosis was metabolic syndrome or alcohol intake.  There was no evidence of rejection on biopsy and there was some evidence of regeneration. Paracentesis done on 12/22/2022 showed lactobacillus indicating SBP, treated with  "Unasyn and Augmentin, finished 2-week course 1/12/2023.  Requires large-volume paracentesis periodically for recurring ascites.    *Paracentesis 1/13/2023 yielded about 7500 cc. Cultures NGTD. paracentesis on 1/24 with 4.8 L fluid removal  Paracentesis on 3/6/23 No SBP    Continue intermittent paracentesis as needed if develops symptomatic ascites.    Monitor for signs and symptoms of recurrent spontaneous bacterial peritonitis.    Further treatment for recurrent ascites with TIPS deferred to his Liver Transplant team and/or Hepatology, he has an appointment on 4/21/2023 with Hepatology, Dr. Simms.    Continue Tacrolimus 1 mg BID, rifaximin 550 mg BID.    Continue lactulose as ordered, titrate as needed to have 2-3 bms.    GI consult as needed in hospital for new acute issues, otherwise as outpatient.    Encourgae high protein diet.    CT abdomen on March 9, 2023 showed small to moderate ascites.    Goals of care   As per prior rounding provider, \"on 1/25 nursing had mentioned that patient had refused to undergo dialysis and want to stop care, palliative care was consulted.  Patient and his spouse denied wanting to stop care and wanted ongoing restorative care including full code\"    Monitor, Full Code status.    Needs placement to TCU -> SW working on placement     Diarrhea, improved, on lactulose for chronic liver disease    C difficile negative on 1/31. Secondary to lactulose. Discontinued rectal tube (2/12) as stools more formed.    Continue lactulose with goal of 2 to 3 soft bowel movement in 24 hours.     Paroxysmal atrial fibrillation  Chronic anticoagulation, apixaban  History of embolic strokes  Remains in sinus rhythm, on anticoagulation with apixaban indefinitely for stroke prophylaxis.      Monitor rhythm.    Continue apixaban anticoagulation, it was briefly held for fistula placement.    Monitor hemoglobin, see below.     Acute anemia  Patient has required intermittent transfusions for on-going anemia.  " Anemia most likely secondary to critical illness/chronic disease, chronic renal disease.  Baseline hemoglobin around 7-8.  Likely Epo resistance.    Hemoglobin stable at 8.4 on 3/30/23.    Monitor intermittently.    Hypercalcemia  Josue hypercalcemia of Immobility    Nephrology following.    Hold VIT D.    Continue to hold Phoslo.    Monitor labs.    Encourage ambulation.    Nephrology considering prolia, however patient's wife would like to hold off for now.    History PEA arrest   PEA arrest prior to his previous admission and 1 episode of PEA associated with desaturation on 12/20.  No structural cardiac disease.     Stable, continue cardiac Monitoring.     Chronic Hypotension  In the past has developed baseline hypotension with cirrhosis and prolonged critical illness.  On hemodialysis.  Receiving midodrine and albumin during dialysis.    Continue midodrine, as per nephrology.      History of seizure   After hypoxic event, in the context of baseline multifactorial encephalopathy secondary to his ongoing critical illness and prior cardiac arrests and prior strokes and cirrhosis with hepatic encephalopathy. MRI of head of 12/20/22 reveals no PRES or leptomeningeal enhancement but scattered.  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   No recent seizure activity.    Continue levetiracetam, lacosamide.    Seizure precautions.    Outpatient follow-up with neurology, consult inpatient if needed.     ESRD  Anemia due to renal disease  Hypotension during dialysis  Hyperphosphatemia    As per nephrology.    Nephrology reviewing vein mapping to assess options for internal access placement for dialysis, see note 3/16.    Underwent Fistula placement on  3/21/22.    Vascular surgery following-continue to use for tunnel catheter.  Outpatient follow-up with vascular surgery and for will need a follow-up ultrasound of the fistula in 6 weeks to assess patency.    Started  sevalmer 800mg TID with meals on 3/27/23.    Complained of cramping with dialysis. Tried a dose of oxycodone prior to dialysis but this was not helpful. He will discuss the cramping with nephrology.     Diabetes mellitus type 2  *Hemoglobin A1c on November 2022 was 4.7  *By report, on Lantus insulin in the past.  Blood sugar checks and sliding scale insulin previously discontinued as has been stable without insulin needs.    Monitor with periodic blood sugar checks as needed.    Hypomagnesemia    Resolved with replacement.     Severe malnutrition, protein and calorie type  Moderate dysphagia  G-tube feedings    Continue with tube feeds via PEG tube.    IR replaced the PEG tube on 2/8/2023, monitor.    Nutritionist consulted and following for tube feeds.    SLP following as well. Oral diet as per speech and language therapy (SLP). Patient hopeful to advance to thin liquids soon.    2/20 Nutrition following for tube feeds.    Now undergoing PRN tube feeds.    Encourage high-protein diet.    Nutrition notes reviewed, patient eating 0 to 100% of meals.  Continue current interventions.  As needed tube feed boluses available if patient consumes less than 50% of the meal.    Family encouraging patient to eat high protein diet including protein bars.     Anxiety  Fluoxetine was discontinued as per psychiatry recommendation on 2/2 (see consult note for details).    Stable, monitor; comfort and reassurance offered during visit.    Psychiatry follow-up.    Restless leg  Syndrome    Patient started on ropiranole by neurology.       Diet: Room Service  Snacks/Supplements Adult: Other; Gelatein+ with brkfst and lunch, and magic cup with dinner (RD); With Meals  Combination Diet Regular Diet; Mildly Thick (level 2) (OK for fresh fruit (e.g., pineapple) per SLP)  Adult Formula Bolus Feeding: Novasource Renal; Route: Gastrostomy; 3 times daily; Volume per Bolus: 240; mL(s); Back up bolus for when pt consumes <50% of meals: 80 mL/hr  x 3 hrs    DVT Prophylaxis: DOAC  Nixon Catheter: Not present  Lines: PRESENT      CVC Double Lumen Right External jugular Tunneled-Site Assessment: WDL  Hemodialysis Vascular Access Arteriovenous fistula Left Forearm-Site Assessment: WDL;Bruit present;Thrill present      Cardiac Monitoring: None  Code Status: Full Code      Clinically Significant Risk Factors           # Hypercalcemia: Highest Ca = 10.2 mg/dL in last 2 days, will monitor as appropriate    # Hypoalbuminemia: Lowest albumin = 1.9 g/dL at 1/23/2023  6:38 AM, will monitor as appropriate            # Severe Malnutrition: based on nutrition assessment        Disposition Plan     Expected Discharge Date: 04/12/2023, 12:00 PM  Discharge Delays: Placement - LTC  Destination: inpatient rehabilitation facility  Discharge Comments: Dialysis pt MWF. Will need placement TCU/LTC. [facility] willing to accept once no sitter, SW to follow up when sitter free          Franky Monet MD  Hospitalist Service  [facility]  Securely message with [employer] (more info)  Text page via [employer] Paging/Directory   ______________________________________________________________________    Interval History      Blanco Osborne was seen this morning.  Sitter now weaned off  No CP/SOB  No fevers recorded  No nausea / vomiting, no significant abdominal pain.  No new complaints today    Physical Exam   Vital Signs: Temp: 97.7  F (36.5  C) Temp src: Oral BP: 113/67 Pulse: 76   Resp: 16 SpO2: 97 % O2 Device: None (Room air)    Weight: 180 lbs 15.96 oz    Constitutional: no apparent distress  Respiratory: no increased work of breathing, clear to auscultation bilaterally, no crackles or wheezing  Cardiovascular: regular rate and rhythm, normal S1 and S2, no murmur noted  GI: normal bowel sounds, soft, non-distended, non-tender  Skin: warm, dry  Musculoskeletal: no lower extremity pitting edema present  Neurologic: awake, alert, appropriate in conversation, moves all  extremities    Medical Decision Making       40 MINUTES SPENT BY ME on the date of service doing chart review, history, exam, documentation & further activities per the note.      Data

## 2023-04-07 NOTE — PLAN OF CARE
Goal Outcome Evaluation:                      DATE & TIME: 4/6/23 pm shift  Cognitive Concerns/ Orientation: A&O to place and time. Orientation fluctuates. Confuse    BEHAVIOR & AGGRESSION TOOL COLOR: Green        ABNL VS/O2: VSS RA   MOBILITY: Assist x 1 gait belt/walker. 10 lb weight bear limit on L arm d/t new fistula.  PAIN MANAGMENT: Gave PRN Tylenol for lower back pain.  DIET: Regular diet, mildly thickened liquids (level 2); takes pills without difficulty- fair  appetite  BOWEL/BLADDER: No bm this shift. Took his schedule lactulose at bedtime; on hemadialysis  (dialysis pt MWF).  ABNL LAB/BG: Cr 4.89, phos 5.6  DRAIN/DEVICES: R chest CVC, double lumen for hemodialysis; R. PIV; PEG tube clamped; L fistula placed 3/21/23 with strong bruit/thrill present. Sutures open to air and CDI, no redness or swelling, no pain at site per pt report.  SKIN: Scattered bruising; L forearm skin tear covered; L fistula site with sutures intact; dry scaly skin  PROCEDURES: Dialysis MWF-tomorrow  D/C DATE: Discharge to TCU/LTC pending placement.    OTHER IMPORTANT INFO: Nephrology following.

## 2023-04-08 ENCOUNTER — APPOINTMENT (OUTPATIENT)
Dept: PHYSICAL THERAPY | Facility: CLINIC | Age: 58
DRG: 981 | End: 2023-04-08
Attending: STUDENT IN AN ORGANIZED HEALTH CARE EDUCATION/TRAINING PROGRAM
Payer: COMMERCIAL

## 2023-04-08 LAB
ALBUMIN SERPL BCG-MCNC: 3.7 G/DL (ref 3.5–5.2)
ANION GAP SERPL CALCULATED.3IONS-SCNC: 12 MMOL/L (ref 7–15)
BUN SERPL-MCNC: 40.9 MG/DL (ref 6–20)
CALCIUM SERPL-MCNC: 9.9 MG/DL (ref 8.6–10)
CHLORIDE SERPL-SCNC: 99 MMOL/L (ref 98–107)
CREAT SERPL-MCNC: 4.46 MG/DL (ref 0.67–1.17)
DEPRECATED HCO3 PLAS-SCNC: 26 MMOL/L (ref 22–29)
ERYTHROCYTE [DISTWIDTH] IN BLOOD BY AUTOMATED COUNT: 15.8 % (ref 10–15)
GFR SERPL CREATININE-BSD FRML MDRD: 15 ML/MIN/1.73M2
GLUCOSE SERPL-MCNC: 100 MG/DL (ref 70–99)
HCT VFR BLD AUTO: 30.3 % (ref 40–53)
HGB BLD-MCNC: 9 G/DL (ref 13.3–17.7)
MCH RBC QN AUTO: 30 PG (ref 26.5–33)
MCHC RBC AUTO-ENTMCNC: 29.7 G/DL (ref 31.5–36.5)
MCV RBC AUTO: 101 FL (ref 78–100)
PHOSPHATE SERPL-MCNC: 4.8 MG/DL (ref 2.5–4.5)
PLATELET # BLD AUTO: 95 10E3/UL (ref 150–450)
POTASSIUM SERPL-SCNC: 3.8 MMOL/L (ref 3.4–5.3)
RBC # BLD AUTO: 3 10E6/UL (ref 4.4–5.9)
SODIUM SERPL-SCNC: 137 MMOL/L (ref 136–145)
WBC # BLD AUTO: 3.4 10E3/UL (ref 4–11)

## 2023-04-08 PROCEDURE — 250N000012 HC RX MED GY IP 250 OP 636 PS 637: Performed by: STUDENT IN AN ORGANIZED HEALTH CARE EDUCATION/TRAINING PROGRAM

## 2023-04-08 PROCEDURE — 250N000013 HC RX MED GY IP 250 OP 250 PS 637: Performed by: STUDENT IN AN ORGANIZED HEALTH CARE EDUCATION/TRAINING PROGRAM

## 2023-04-08 PROCEDURE — 250N000013 HC RX MED GY IP 250 OP 250 PS 637: Performed by: HOSPITALIST

## 2023-04-08 PROCEDURE — 97110 THERAPEUTIC EXERCISES: CPT | Mod: GP

## 2023-04-08 PROCEDURE — 85027 COMPLETE CBC AUTOMATED: CPT | Performed by: STUDENT IN AN ORGANIZED HEALTH CARE EDUCATION/TRAINING PROGRAM

## 2023-04-08 PROCEDURE — 250N000013 HC RX MED GY IP 250 OP 250 PS 637: Performed by: PSYCHIATRY & NEUROLOGY

## 2023-04-08 PROCEDURE — 82040 ASSAY OF SERUM ALBUMIN: CPT | Performed by: STUDENT IN AN ORGANIZED HEALTH CARE EDUCATION/TRAINING PROGRAM

## 2023-04-08 PROCEDURE — 120N000001 HC R&B MED SURG/OB

## 2023-04-08 PROCEDURE — 97116 GAIT TRAINING THERAPY: CPT | Mod: GP

## 2023-04-08 PROCEDURE — 36415 COLL VENOUS BLD VENIPUNCTURE: CPT | Performed by: STUDENT IN AN ORGANIZED HEALTH CARE EDUCATION/TRAINING PROGRAM

## 2023-04-08 RX ADMIN — FOLIC ACID 1 MG: 1 TABLET ORAL at 08:38

## 2023-04-08 RX ADMIN — SEVELAMER CARBONATE 800 MG: 800 TABLET, FILM COATED ORAL at 08:38

## 2023-04-08 RX ADMIN — TACROLIMUS 1 MG: 1 CAPSULE ORAL at 20:20

## 2023-04-08 RX ADMIN — TACROLIMUS 1 MG: 1 CAPSULE ORAL at 08:38

## 2023-04-08 RX ADMIN — LACOSAMIDE 100 MG: 100 TABLET, FILM COATED ORAL at 14:04

## 2023-04-08 RX ADMIN — LACTULOSE 10 G: 10 SOLUTION ORAL at 20:20

## 2023-04-08 RX ADMIN — GUAIFENESIN 600 MG: 600 TABLET, EXTENDED RELEASE ORAL at 20:20

## 2023-04-08 RX ADMIN — Medication 1 CAPSULE: at 08:38

## 2023-04-08 RX ADMIN — MIDODRINE HYDROCHLORIDE 10 MG: 5 TABLET ORAL at 14:04

## 2023-04-08 RX ADMIN — SEVELAMER CARBONATE 800 MG: 800 TABLET, FILM COATED ORAL at 18:16

## 2023-04-08 RX ADMIN — PANTOPRAZOLE SODIUM 40 MG: 40 TABLET, DELAYED RELEASE ORAL at 08:38

## 2023-04-08 RX ADMIN — GUAIFENESIN 600 MG: 600 TABLET, EXTENDED RELEASE ORAL at 08:38

## 2023-04-08 RX ADMIN — MIDODRINE HYDROCHLORIDE 10 MG: 5 TABLET ORAL at 08:38

## 2023-04-08 RX ADMIN — RIFAXIMIN 550 MG: 550 TABLET ORAL at 20:20

## 2023-04-08 RX ADMIN — APIXABAN 5 MG: 5 TABLET, FILM COATED ORAL at 20:20

## 2023-04-08 RX ADMIN — RIFAXIMIN 550 MG: 550 TABLET ORAL at 08:39

## 2023-04-08 RX ADMIN — APIXABAN 5 MG: 5 TABLET, FILM COATED ORAL at 08:38

## 2023-04-08 RX ADMIN — OLANZAPINE 5 MG: 5 TABLET, ORALLY DISINTEGRATING ORAL at 21:30

## 2023-04-08 RX ADMIN — MIDODRINE HYDROCHLORIDE 10 MG: 5 TABLET ORAL at 18:16

## 2023-04-08 RX ADMIN — LEVETIRACETAM 1000 MG: 500 TABLET, FILM COATED ORAL at 21:30

## 2023-04-08 RX ADMIN — SEVELAMER CARBONATE 800 MG: 800 TABLET, FILM COATED ORAL at 14:04

## 2023-04-08 RX ADMIN — RAMELTEON 8 MG: 8 TABLET, FILM COATED ORAL at 20:20

## 2023-04-08 RX ADMIN — ROPINIROLE HYDROCHLORIDE 0.5 MG: 0.25 TABLET, FILM COATED ORAL at 21:30

## 2023-04-08 RX ADMIN — LACTULOSE 10 G: 10 SOLUTION ORAL at 08:39

## 2023-04-08 ASSESSMENT — ACTIVITIES OF DAILY LIVING (ADL)
ADLS_ACUITY_SCORE: 51
ADLS_ACUITY_SCORE: 54
ADLS_ACUITY_SCORE: 51
ADLS_ACUITY_SCORE: 54
ADLS_ACUITY_SCORE: 54
ADLS_ACUITY_SCORE: 51
ADLS_ACUITY_SCORE: 54
ADLS_ACUITY_SCORE: 51
ADLS_ACUITY_SCORE: 51
ADLS_ACUITY_SCORE: 54
ADLS_ACUITY_SCORE: 54
ADLS_ACUITY_SCORE: 51

## 2023-04-08 NOTE — CONSULTS
"CLINICAL NUTRITION SERVICES BRIEF NOTE  Refer to RD note dated 4/6 for last reassessment.     Pt/Family Request received \"is it time to do calorie counts and try to remove G tube\"    New Findings  - Pt has been eating fairly consistently, has some 'off' days from time to time.   - Lately has been refusing/declining any offered TF boluses, though hasn't often met criteria for them (<50% of meals consumed)    Intervention  - Start Calorie Counts to run from 4/8-4/10, if meets >60% estimated needs OK to remove tube.     Edna Osuna RD, LD  [facility], [facility], [facility]  Pager: [phone]  Weekend Pager: [phone]      "

## 2023-04-08 NOTE — PROGRESS NOTES
[facility]    Medicine Progress Note - Hospitalist Service    Date of Admission:  1/21/2023  Date of Service: 04/08/2023    Assessment & Plan      Blanco Osborne is a 57 year-old male admitted on 1/21/2023 as a transfer from [facility] for evaluation of loculated pleural effusion. He has extensive PMH including h/o liver transplant 2016 due to alcohol abuse, pAF on chronic anticoagulation, history of diabetes mellitus type 2, CVA, hypertension, CHRISTIAN on BiPAP.  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 He was recovering in [facility] and was transferred to [facility] for consideration of chest tube placement and possible intrapleural lysis for worsening effusion.     Acute metabolic encephalopathy with delirium, multifactorial, slowly improving?  Hepatic encephalopathy  Chronic liver disease, history of liver transplant 2016  End-stage renal disease (ESRD), on hemodialysis (HD)  History of seizure, on Keppra and Vimpat  Waxing and waning mentation, coinciding with lactulose being held at [facility]  Earlier in hospital stay, remained confused and had pulled out tracheostomy tube, PICC line and pulled his dialysis catheter.  Lines replaced.  Psychiatry consulted, recent follow-up 2/21, 2/28, see notes.    Mental status continues to fluctuate with periods of alertness and increased sleepiness, overall improving.    Complicated, prolonged hospital course with mental status concerns multifactorial related to hepatic encephalopathy, end-stage renal disease on dialysis, past PEA arrest, seizure disorder, medication, and chronic liver disease with prior liver transplant.  As mental status fluctuates will continue to require one-to-one sitter as needed for added safety and supervision.  Reassess  daily.    Continue to reorient and redirect as able.  Avoid restraints if possible with goals of safety and close supervision (given multiple lines and tubes - dialysis catheter, PEG tube, IV, etc).    Maintain normal day/night cycles and sleep-wake cycles.  Minimize sedating medications as able.  Remains weak and deconditioned with complex, prolonged hospital course and limited mobility.  Encouragement and support offered daily to patient.    Continue Lactulose to 10 mg BID, monitor for symptoms; goal to have 2-3 bowel movements per day.    Ongoing hemodialysis, as per nephrology, 3X/week dialysis schedule.    Nephrology following    Continue ramelteon 8 mg at bedtime; monitor for side effects including AM sedation, reassess daily.    Started on olanzapine 5 mg BID and PRN earlier during this hospitalization. Delirium improved, discontinued AM dose of olanzapine on non-dialysis days.    Seizure disorder stable, continue Keppra and Vimpat; monitor, no recurrent seizures reported of recent.    Reconsult psychiatry as needed.  Neurology consult appreciated.  Patient has been started on ropinirole for restless leg syndrome.     Bilateral pleural effusions   Acute respiratory failure requiring tracheostomy  - resolved    Pneumonia  - resolved   ARDS  - resolved    Right pneumothorax - resolved   *Initial event was parainfluenza and strep pneumo pneumonia back in November 2022. Treated for ARDS. Had failed extubation x2 and tracheostomy performed on previous hospitalization. *Had additional complications of bilateral pneumothoraces as well as hydropneumothorax- pleural fluid grew candida parapsilosis  *Completed 4-week antifungal treatment. While in [facility] he was successfully weaned from the ventilator.  *Recently there were concern for worsening effusion while at [facility] and had thoracentesis 01/13 which returned exudative without growth on cultures. CT chest/abdomen/pelvis on 01/18 demonstrated worsening effusions and  concerns for infected parapneumonic effusions with possible SBP.  Multiple pulmonologist at [facility] reviewed CT scan recommended transfer to [facility] for consideration of chest tube placement and possible intrapleural lysis  *Thoracic surgery (Dr. Jackson) consulted during admission   *CT chest 1/22, small effusions on imaging and so chest tube was not inserted.   ID consulted, see note 2/10.  *Patient pulled out his tracheostomy tube on the night 2/1-2/2 and is tolerating well without increased shortness of breath persistent cough or worsening hypoxia.  * Chest x-ray 2/3 with cardiomegaly, chronic small bilateral pleural effusions, no pulmonary edema; right internal jugular dialysis catheter in place.   3/12 Pulmonary consult, see note, concerns of hypercapnia, atelectasis, with consideration of BiPAP trial; no recommendations for antibiotics with infiltrates felt to be atelectasis.    Stable, continue to monitor respiratory status, recently stable on room air; occasional cough reported.    Encourage incentive spirometry as able, up to chair, deep breathing.    Wound care previously consulted for tracheostomy site care, monitor for signs and symptoms of infection, healing well - resolved.    Encourage ambulation.    Possible splenic infract  Wedge shaped area on CT scan chest and CT abdomen on 3/7/23.    Hematology consulted, suspicion for infarct was low.    TTE no thrombus or vegetations.    No abdominal pain CTM.     S/p liver transplant 2016   Chronic immunosuppression, on tacrolimus  Cirrhosis with ascites  Subacute bacterial peritonitis (SBP), resolved  *Main manifestations of this are hepatic encephalopathy and ascites.  Hepatologist at [facility] felt that most likely reason for the cirrhosis was metabolic syndrome or alcohol intake.  There was no evidence of rejection on biopsy and there was some evidence of regeneration. Paracentesis done on 12/22/2022 showed lactobacillus indicating SBP, treated with  "Unasyn and Augmentin, finished 2-week course 1/12/2023.  Requires large-volume paracentesis periodically for recurring ascites.    *Paracentesis 1/13/2023 yielded about 7500 cc. Cultures NGTD. paracentesis on 1/24 with 4.8 L fluid removal  Paracentesis on 3/6/23 No SBP    Continue intermittent paracentesis as needed if develops symptomatic ascites.    Monitor for signs and symptoms of recurrent spontaneous bacterial peritonitis.    Further treatment for recurrent ascites with TIPS deferred to his Liver Transplant team and/or Hepatology, he has an appointment on 4/21/2023 with Hepatology, Dr. Simms.    Continue Tacrolimus 1 mg BID, rifaximin 550 mg BID.    Continue lactulose as ordered, titrate as needed to have 2-3 bms.    GI consult as needed in hospital for new acute issues, otherwise as outpatient.    Encourgae high protein diet.    CT abdomen on March 9, 2023 showed small to moderate ascites.    Goals of care   As per prior rounding provider, \"on 1/25 nursing had mentioned that patient had refused to undergo dialysis and want to stop care, palliative care was consulted.  Patient and his spouse denied wanting to stop care and wanted ongoing restorative care including full code\"    Monitor, Full Code status.    Needs placement to TCU -> SW working on placement     Diarrhea, improved, on lactulose for chronic liver disease    C difficile negative on 1/31. Secondary to lactulose. Discontinued rectal tube (2/12) as stools more formed.    Continue lactulose with goal of 2 to 3 soft bowel movement in 24 hours.     Paroxysmal atrial fibrillation  Chronic anticoagulation, apixaban  History of embolic strokes  Remains in sinus rhythm, on anticoagulation with apixaban indefinitely for stroke prophylaxis.      Monitor rhythm.    Continue apixaban anticoagulation, it was briefly held for fistula placement.    Monitor hemoglobin, see below.     Acute anemia  Patient has required intermittent transfusions for on-going anemia.  " Anemia most likely secondary to critical illness/chronic disease, chronic renal disease.  Baseline hemoglobin around 7-8.  Likely Epo resistance.    Hemoglobin stable at 8.4 on 3/30/23.    Monitor intermittently.    Hypercalcemia  Josue hypercalcemia of Immobility    Nephrology following.    Hold VIT D.    Continue to hold Phoslo.    Monitor labs.    Encourage ambulation.    Nephrology considering prolia, however patient's wife would like to hold off for now.    History PEA arrest   PEA arrest prior to his previous admission and 1 episode of PEA associated with desaturation on 12/20.  No structural cardiac disease.     Stable, continue cardiac Monitoring.     Chronic Hypotension  In the past has developed baseline hypotension with cirrhosis and prolonged critical illness.  On hemodialysis.  Receiving midodrine and albumin during dialysis.    Continue midodrine, as per nephrology.      History of seizure   After hypoxic event, in the context of baseline multifactorial encephalopathy secondary to his ongoing critical illness and prior cardiac arrests and prior strokes and cirrhosis with hepatic encephalopathy. MRI of head of 12/20/22 reveals no PRES or leptomeningeal enhancement but scattered.  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   No recent seizure activity.    Continue levetiracetam, lacosamide.    Seizure precautions.    Outpatient follow-up with neurology, consult inpatient if needed.     ESRD  Anemia due to renal disease  Hypotension during dialysis  Hyperphosphatemia    As per nephrology.    Nephrology reviewing vein mapping to assess options for internal access placement for dialysis, see note 3/16.    Underwent Fistula placement on  3/21/22.    Vascular surgery following-continue to use for tunnel catheter.  Outpatient follow-up with vascular surgery and for will need a follow-up ultrasound of the fistula in 6 weeks to assess patency.    Started  sevalmer 800mg TID with meals on 3/27/23.    Complained of cramping with dialysis. Tried a dose of oxycodone prior to dialysis but this was not helpful. He will discuss the cramping with nephrology.     Diabetes mellitus type 2  *Hemoglobin A1c on November 2022 was 4.7  *By report, on Lantus insulin in the past.  Blood sugar checks and sliding scale insulin previously discontinued as has been stable without insulin needs.    Monitor with periodic blood sugar checks as needed.    Hypomagnesemia    Resolved with replacement.     Severe malnutrition, protein and calorie type  Moderate dysphagia  G-tube feedings    Continue with tube feeds via PEG tube.    IR replaced the PEG tube on 2/8/2023, monitor.    Nutritionist consulted and following for tube feeds.    SLP following as well. Oral diet as per speech and language therapy (SLP). Patient hopeful to advance to thin liquids soon.    2/20 Nutrition following for tube feeds.    Now undergoing PRN tube feeds.    Encourage high-protein diet.    Nutrition notes reviewed, patient eating 0 to 100% of meals.  Continue current interventions.  As needed tube feed boluses available if patient consumes less than 50% of the meal.    Family encouraging patient to eat high protein diet including protein bars    Family asking about g tube removal. We discussed it depends on his oral intake. Per the EMR, last tube feed bolus was 3/22 if adequately documented. Will ask for nutritionist opinion, perhaps calorie counts are in order.     Anxiety  Fluoxetine was discontinued as per psychiatry recommendation on 2/2 (see consult note for details).    Stable, monitor; comfort and reassurance offered during visit.    Psychiatry follow-up.    Restless leg  Syndrome    Patient started on ropiranole by neurology.       Diet: Room Service  Snacks/Supplements Adult: Other; Gelatein+ with brkfst and lunch, and magic cup with dinner (RD); With Meals  Combination Diet Regular Diet; Mildly Thick (level  2) (OK for fresh fruit (e.g., pineapple) per SLP)  Adult Formula Bolus Feeding: Novasource Renal; Route: Gastrostomy; 3 times daily; Volume per Bolus: 240; mL(s); Back up bolus for when pt consumes <50% of meals: 80 mL/hr x 3 hrs  Calorie Counts    DVT Prophylaxis: DOAC  Nixon Catheter: Not present  Lines: PRESENT      CVC Double Lumen Right External jugular Tunneled-Site Assessment: WDL  Hemodialysis Vascular Access Arteriovenous fistula Left Forearm-Site Assessment: WDL;Bruit present;Thrill present      Cardiac Monitoring: None  Code Status: Full Code      Clinically Significant Risk Factors              # Hypoalbuminemia: Lowest albumin = 1.9 g/dL at 1/23/2023  6:38 AM, will monitor as appropriate   # Thrombocytopenia: Lowest platelets = 95 in last 2 days, will monitor for bleeding          # Severe Malnutrition: based on nutrition assessment        Disposition Plan     Expected Discharge Date: 04/12/2023, 12:00 PM  Discharge Delays: Placement - LTC  Destination: inpatient rehabilitation facility  Discharge Comments: Dialysis pt MWF. Will need placement TCU/LTC. [facility] willing to accept once no sitter, SW to follow up when sitter free          Kurtis Butterfield DO  Hospitalist Service  [facility]  Securely message with [employer] (more info)  Text page via [employer] Paging/Directory   ______________________________________________________________________    Interval History      Blanco Osborne was seen this morning.  Had an unwitnessed fall last night, but no major complaints today.   Has a long arm sitter. Brother at the bedside wondering about g tube removal.     Physical Exam   Vital Signs: Temp: 98  F (36.7  C) Temp src: Oral BP: 116/64 Pulse: 82   Resp: 16 SpO2: 98 % O2 Device: None (Room air)    Weight: 180 lbs 15.96 oz    Constitutional: no apparent distress  Respiratory: no increased work of breathing, clear to auscultation bilaterally, no crackles or  wheezing  Cardiovascular: regular rate and rhythm, normal S1 and S2, no murmur noted  GI: normal bowel sounds, soft, non-distended, non-tender  Skin: warm, dry  Musculoskeletal: no lower extremity pitting edema present  Neurologic: awake, alert, appropriate in conversation, moves all extremities    Medical Decision Making       40 MINUTES SPENT BY ME on the date of service doing chart review, history, exam, documentation & further activities per the note.      Data     I have personally reviewed the following data over the past 24 hrs:    3.4 (L)  \   9.0 (L)   / 95 (L)     137 99 40.9 (H) /  100 (H)   3.8 26 4.46 (H) \       ALT: N/A AST: N/A AP: N/A TBILI: N/A   ALB: 3.7 TOT PROTEIN: N/A LIPASE: N/A

## 2023-04-08 NOTE — PLAN OF CARE
DATE & TIME: 04/07/2023 Night  Cognitive Concerns/ Orientation: Disoriented to time/situation, with increased confusion at times; can be impulsive at times  BEHAVIOR & AGGRESSION TOOL COLOR: Green        ABNL VS/O2: VSS, room air  MOBILITY: Assist-1 gait belt/walker; 10 lb weight bear limit on L arm d/t new fistula  PAIN MANAGMENT: Denies  DIET: Regular diet, mildly thickened liquids (level 2); takes pills without difficulty  BOWEL/BLADDER: BM this shift; pt on hemodialysis  ABNL LAB/BG: BUN 36.2; Creat 3.59  DRAIN/DEVICES: R chest CVC, double lumen for hemodialysis; R PIV saline locked; PEG tube clamped; L fistula placed 3/21/23 with strong bruit/thrill present--open to air, no redness/swelling  SKIN: Scattered bruising; L forearm skin tear covered; L fistula healing; dry scaly skin  PROCEDURES: Dialysis MWF  D/C DATE: Pending placement to TCU/LTC   OTHER IMPORTANT INFO: Nephrology following

## 2023-04-08 NOTE — PROGRESS NOTES
[facility] brief note:    Unwitnessed, likely mechanical fall without obvious injury.    Upon arrival, pt sitting in the chair recliner with BLE elevated, awake, alert, in no overt distress.  Nursing notes bed alarm sounded, and nursing promptly entered pt's room to find pt kneeling beside his bed, head and trunk upright, BUE resting on the bed.  Pt notes he needed to use the restroom.  Pt was assisted off the floor without difficult.  Pt reports no new pain.  Pt with no obvious head trauma noted.  Pt unable to fully describe events of fall but suspect due to underlying encephalopathy.  Pt's room near nursing station.  In setting of nursing promptly entering pt's room after bed alarm sounded and pt's head was upright, will defer imaging at this time.    Maintain fall precautions at all times.    STEFFANY Valdez, CNP  [facility]    No charge.

## 2023-04-08 NOTE — PROGRESS NOTES
Pt had an unwitnessed fall. Bed alarm was going off. He was found facing the bed on his knees. Denies any associated pain. Nurse Practitioner notified

## 2023-04-08 NOTE — PROVIDER NOTIFICATION
Paged on behalf of primary RN Alena BILLY Notification    Notified Person: NP    Notified Person Name: Shun    Notification Date/Time: 4/8/23 0610    Notification Interaction: Paged    Purpose of Notification: Pt had unwitnessed fall onto knees when trying to get out of bed. Bed alarm was on.    Orders Received:    Comments:

## 2023-04-08 NOTE — PLAN OF CARE
Summary: [id number] 4/7/23 hepatic encephalopathy  Orientation: disoriented to situation and time   Activity Level: Ax1 GB/W  Fall Risk: yes  Behavior & Aggression Tool Color: green  Pain Management: PRN Tylenol given for back pain  ABNL VS/O2: VSS on RA  ABNL Lab/BG: n/a  Diet: combo regular, mild thick  Bowel/Bladder: continent   Drains/Devices: R PIV saline locked. Port and L arm fistula  Tests/Procedures for next shift: 4/8/23 1015 PT  Anticipated DC date: placement pending  Other Important Info:

## 2023-04-09 LAB
ALBUMIN SERPL BCG-MCNC: 3.6 G/DL (ref 3.5–5.2)
ANION GAP SERPL CALCULATED.3IONS-SCNC: 11 MMOL/L (ref 7–15)
BUN SERPL-MCNC: 56.4 MG/DL (ref 6–20)
CALCIUM SERPL-MCNC: 10.4 MG/DL (ref 8.6–10)
CHLORIDE SERPL-SCNC: 94 MMOL/L (ref 98–107)
CREAT SERPL-MCNC: 5.84 MG/DL (ref 0.67–1.17)
DEPRECATED HCO3 PLAS-SCNC: 27 MMOL/L (ref 22–29)
GFR SERPL CREATININE-BSD FRML MDRD: 10 ML/MIN/1.73M2
GLUCOSE SERPL-MCNC: 107 MG/DL (ref 70–99)
MAGNESIUM SERPL-MCNC: 2 MG/DL (ref 1.7–2.3)
PHOSPHATE SERPL-MCNC: 6.3 MG/DL (ref 2.5–4.5)
POTASSIUM SERPL-SCNC: 4.1 MMOL/L (ref 3.4–5.3)
SODIUM SERPL-SCNC: 132 MMOL/L (ref 136–145)

## 2023-04-09 PROCEDURE — 250N000013 HC RX MED GY IP 250 OP 250 PS 637: Performed by: STUDENT IN AN ORGANIZED HEALTH CARE EDUCATION/TRAINING PROGRAM

## 2023-04-09 PROCEDURE — 99232 SBSQ HOSP IP/OBS MODERATE 35: CPT | Performed by: HOSPITALIST

## 2023-04-09 PROCEDURE — 80069 RENAL FUNCTION PANEL: CPT | Performed by: STUDENT IN AN ORGANIZED HEALTH CARE EDUCATION/TRAINING PROGRAM

## 2023-04-09 PROCEDURE — 250N000013 HC RX MED GY IP 250 OP 250 PS 637: Performed by: HOSPITALIST

## 2023-04-09 PROCEDURE — 36415 COLL VENOUS BLD VENIPUNCTURE: CPT | Performed by: STUDENT IN AN ORGANIZED HEALTH CARE EDUCATION/TRAINING PROGRAM

## 2023-04-09 PROCEDURE — 120N000001 HC R&B MED SURG/OB

## 2023-04-09 PROCEDURE — 83735 ASSAY OF MAGNESIUM: CPT | Performed by: HOSPITALIST

## 2023-04-09 PROCEDURE — 250N000012 HC RX MED GY IP 250 OP 636 PS 637: Performed by: STUDENT IN AN ORGANIZED HEALTH CARE EDUCATION/TRAINING PROGRAM

## 2023-04-09 PROCEDURE — 250N000013 HC RX MED GY IP 250 OP 250 PS 637: Performed by: PSYCHIATRY & NEUROLOGY

## 2023-04-09 RX ADMIN — TACROLIMUS 1 MG: 1 CAPSULE ORAL at 08:55

## 2023-04-09 RX ADMIN — LACTULOSE 10 G: 10 SOLUTION ORAL at 08:55

## 2023-04-09 RX ADMIN — LACOSAMIDE 100 MG: 100 TABLET, FILM COATED ORAL at 23:42

## 2023-04-09 RX ADMIN — Medication 1 CAPSULE: at 08:55

## 2023-04-09 RX ADMIN — PANTOPRAZOLE SODIUM 40 MG: 40 TABLET, DELAYED RELEASE ORAL at 08:55

## 2023-04-09 RX ADMIN — APIXABAN 5 MG: 5 TABLET, FILM COATED ORAL at 20:22

## 2023-04-09 RX ADMIN — LACOSAMIDE 100 MG: 100 TABLET, FILM COATED ORAL at 00:54

## 2023-04-09 RX ADMIN — APIXABAN 5 MG: 5 TABLET, FILM COATED ORAL at 08:55

## 2023-04-09 RX ADMIN — RIFAXIMIN 550 MG: 550 TABLET ORAL at 20:22

## 2023-04-09 RX ADMIN — MIDODRINE HYDROCHLORIDE 10 MG: 5 TABLET ORAL at 12:34

## 2023-04-09 RX ADMIN — FOLIC ACID 1 MG: 1 TABLET ORAL at 08:55

## 2023-04-09 RX ADMIN — ACETAMINOPHEN 650 MG: 325 TABLET, FILM COATED ORAL at 11:03

## 2023-04-09 RX ADMIN — TACROLIMUS 1 MG: 1 CAPSULE ORAL at 20:22

## 2023-04-09 RX ADMIN — GUAIFENESIN 600 MG: 600 TABLET, EXTENDED RELEASE ORAL at 20:22

## 2023-04-09 RX ADMIN — SEVELAMER CARBONATE 800 MG: 800 TABLET, FILM COATED ORAL at 12:34

## 2023-04-09 RX ADMIN — LACOSAMIDE 100 MG: 100 TABLET, FILM COATED ORAL at 12:34

## 2023-04-09 RX ADMIN — SEVELAMER CARBONATE 800 MG: 800 TABLET, FILM COATED ORAL at 17:19

## 2023-04-09 RX ADMIN — SEVELAMER CARBONATE 800 MG: 800 TABLET, FILM COATED ORAL at 08:55

## 2023-04-09 RX ADMIN — OLANZAPINE 5 MG: 5 TABLET, ORALLY DISINTEGRATING ORAL at 21:39

## 2023-04-09 RX ADMIN — MIDODRINE HYDROCHLORIDE 10 MG: 5 TABLET ORAL at 08:55

## 2023-04-09 RX ADMIN — MIDODRINE HYDROCHLORIDE 10 MG: 5 TABLET ORAL at 17:19

## 2023-04-09 RX ADMIN — RAMELTEON 8 MG: 8 TABLET, FILM COATED ORAL at 20:22

## 2023-04-09 RX ADMIN — ROPINIROLE HYDROCHLORIDE 0.5 MG: 0.25 TABLET, FILM COATED ORAL at 21:39

## 2023-04-09 RX ADMIN — GUAIFENESIN 600 MG: 600 TABLET, EXTENDED RELEASE ORAL at 08:55

## 2023-04-09 RX ADMIN — RIFAXIMIN 550 MG: 550 TABLET ORAL at 08:55

## 2023-04-09 RX ADMIN — LEVETIRACETAM 1000 MG: 500 TABLET, FILM COATED ORAL at 21:39

## 2023-04-09 ASSESSMENT — ACTIVITIES OF DAILY LIVING (ADL)
ADLS_ACUITY_SCORE: 51
ADLS_ACUITY_SCORE: 48
ADLS_ACUITY_SCORE: 51
ADLS_ACUITY_SCORE: 48
ADLS_ACUITY_SCORE: 48
ADLS_ACUITY_SCORE: 51
ADLS_ACUITY_SCORE: 48

## 2023-04-09 NOTE — PROGRESS NOTES
[facility]    Medicine Progress Note - Hospitalist Service    Date of Admission:  1/21/2023  Date of Service: 04/09/2023    Assessment & Plan      Blanco Osborne is a 57 year-old male admitted on 1/21/2023 as a transfer from [facility] for evaluation of loculated pleural effusion. He has extensive PMH including h/o liver transplant 2016 due to alcohol abuse, pAF on chronic anticoagulation, history of diabetes mellitus type 2, CVA, hypertension, CHRISTIAN on BiPAP.  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 He was recovering in [facility] and was transferred to [facility] for consideration of chest tube placement and possible intrapleural lysis for worsening effusion.     Acute metabolic encephalopathy with delirium, multifactorial, slowly improving?  Hepatic encephalopathy  Chronic liver disease, history of liver transplant 2016  End-stage renal disease (ESRD), on hemodialysis (HD)  History of seizure, on Keppra and Vimpat  Waxing and waning mentation, coinciding with lactulose being held at [facility]  Earlier in hospital stay, remained confused and had pulled out tracheostomy tube, PICC line and pulled his dialysis catheter.  Lines replaced.  Psychiatry consulted, recent follow-up 2/21, 2/28, see notes.    Mental status continues to fluctuate with periods of alertness and increased sleepiness, overall improving.    Complicated, prolonged hospital course with mental status concerns multifactorial related to hepatic encephalopathy, end-stage renal disease on dialysis, past PEA arrest, seizure disorder, medication, and chronic liver disease with prior liver transplant.  As mental status fluctuates will continue to require one-to-one sitter as needed for added safety and supervision.  Reassess  daily.    Continue to reorient and redirect as able.  Avoid restraints if possible with goals of safety and close supervision (given multiple lines and tubes - dialysis catheter, PEG tube, IV, etc).    Maintain normal day/night cycles and sleep-wake cycles.  Minimize sedating medications as able.  Remains weak and deconditioned with complex, prolonged hospital course and limited mobility.  Encouragement and support offered daily to patient.    Continue Lactulose to 10 mg BID, monitor for symptoms; goal to have 2-3 bowel movements per day.    Ongoing hemodialysis, as per nephrology, 3X/week dialysis schedule.    Nephrology following    Continue ramelteon 8 mg at bedtime; monitor for side effects including AM sedation, reassess daily.    Started on olanzapine 5 mg BID and PRN earlier during this hospitalization. Delirium improved, discontinued AM dose of olanzapine on non-dialysis days.    Seizure disorder stable, continue Keppra and Vimpat; monitor, no recurrent seizures reported of recent.    Reconsult psychiatry as needed.  Neurology consult appreciated.  Patient has been started on ropinirole for restless leg syndrome.     Bilateral pleural effusions   Acute respiratory failure requiring tracheostomy  - resolved    Pneumonia  - resolved   ARDS  - resolved    Right pneumothorax - resolved   *Initial event was parainfluenza and strep pneumo pneumonia back in November 2022. Treated for ARDS. Had failed extubation x2 and tracheostomy performed on previous hospitalization. *Had additional complications of bilateral pneumothoraces as well as hydropneumothorax- pleural fluid grew candida parapsilosis  *Completed 4-week antifungal treatment. While in [facility]ACH he was successfully weaned from the ventilator.  *Recently there were concern for worsening effusion while at LTACH and had thoracentesis 01/13 which returned exudative without growth on cultures. CT chest/abdomen/pelvis on 01/18 demonstrated worsening effusions and  concerns for infected parapneumonic effusions with possible SBP.  Multiple pulmonologist at LTACH reviewed CT scan recommended transfer to [facility] for consideration of chest tube placement and possible intrapleural lysis  *Thoracic surgery (Dr. Jackson) consulted during admission   *CT chest 1/22, small effusions on imaging and so chest tube was not inserted.   ID consulted, see note 2/10.  *Patient pulled out his tracheostomy tube on the night 2/1-2/2 and is tolerating well without increased shortness of breath persistent cough or worsening hypoxia.  * Chest x-ray 2/3 with cardiomegaly, chronic small bilateral pleural effusions, no pulmonary edema; right internal jugular dialysis catheter in place.   3/12 Pulmonary consult, see note, concerns of hypercapnia, atelectasis, with consideration of BiPAP trial; no recommendations for antibiotics with infiltrates felt to be atelectasis.    Stable, continue to monitor respiratory status, recently stable on room air; occasional cough reported.    Encourage incentive spirometry as able, up to chair, deep breathing.    Wound care previously consulted for tracheostomy site care, monitor for signs and symptoms of infection, healing well - resolved.    Encourage ambulation.    Possible splenic infract  Wedge shaped area on CT scan chest and CT abdomen on 3/7/23.    Hematology consulted, suspicion for infarct was low.    TTE no thrombus or vegetations.    No abdominal pain CTM.     S/p liver transplant 2016   Chronic immunosuppression, on tacrolimus  Cirrhosis with ascites  Subacute bacterial peritonitis (SBP), resolved  *Main manifestations of this are hepatic encephalopathy and ascites.  Hepatologist at [place] felt that most likely reason for the cirrhosis was metabolic syndrome or alcohol intake.  There was no evidence of rejection on biopsy and there was some evidence of regeneration. Paracentesis done on 12/22/2022 showed lactobacillus indicating SBP, treated with  "Unasyn and Augmentin, finished 2-week course 1/12/2023.  Requires large-volume paracentesis periodically for recurring ascites.    *Paracentesis 1/13/2023 yielded about 7500 cc. Cultures NGTD. paracentesis on 1/24 with 4.8 L fluid removal  Paracentesis on 3/6/23 No SBP    Continue intermittent paracentesis as needed if develops symptomatic ascites.    Monitor for signs and symptoms of recurrent spontaneous bacterial peritonitis.    Further treatment for recurrent ascites with TIPS deferred to his Liver Transplant team and/or Hepatology, he has an appointment on 4/21/2023 with Hepatology, Dr. Simms.    Continue Tacrolimus 1 mg BID, rifaximin 550 mg BID.    Continue lactulose as ordered, titrate as needed to have 2-3 bms.    GI consult as needed in hospital for new acute issues, otherwise as outpatient.    Encourgae high protein diet.    CT abdomen on March 9, 2023 showed small to moderate ascites.    Goals of care   As per prior rounding provider, \"on 1/25 nursing had mentioned that patient had refused to undergo dialysis and want to stop care, palliative care was consulted.  Patient and his spouse denied wanting to stop care and wanted ongoing restorative care including full code\"    Monitor, Full Code status.    Needs placement to TCU -> SW working on placement     Diarrhea, improved, on lactulose for chronic liver disease    C difficile negative on 1/31. Secondary to lactulose. Discontinued rectal tube (2/12) as stools more formed.    Continue lactulose with goal of 2 to 3 soft bowel movement in 24 hours.     Paroxysmal atrial fibrillation  Chronic anticoagulation, apixaban  History of embolic strokes  Remains in sinus rhythm, on anticoagulation with apixaban indefinitely for stroke prophylaxis.      Monitor rhythm.    Continue apixaban anticoagulation, it was briefly held for fistula placement.    Monitor hemoglobin, see below.     Acute anemia  Patient has required intermittent transfusions for on-going anemia.  " Anemia most likely secondary to critical illness/chronic disease, chronic renal disease.  Baseline hemoglobin around 7-8.  Likely Epo resistance.    Hemoglobin stable at 8.4 on 3/30/23.    Monitor intermittently.    Hypercalcemia  Josue hypercalcemia of Immobility    Nephrology following.    Hold VIT D.    Continue to hold Phoslo.    Monitor labs.    Encourage ambulation.    Nephrology considering prolia, however patient's wife would like to hold off for now.    History PEA arrest   PEA arrest prior to his previous admission and 1 episode of PEA associated with desaturation on 12/20.  No structural cardiac disease.     Stable, continue cardiac Monitoring.     Chronic Hypotension  In the past has developed baseline hypotension with cirrhosis and prolonged critical illness.  On hemodialysis.  Receiving midodrine and albumin during dialysis.    Continue midodrine, as per nephrology.      History of seizure   After hypoxic event, in the context of baseline multifactorial encephalopathy secondary to his ongoing critical illness and prior cardiac arrests and prior strokes and cirrhosis with hepatic encephalopathy. MRI of head of 12/20/22 reveals no PRES or leptomeningeal enhancement but scattered.  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   No recent seizure activity.    Continue levetiracetam, lacosamide.    Seizure precautions.    Outpatient follow-up with neurology, consult inpatient if needed.     ESRD  Anemia due to renal disease  Hypotension during dialysis  Hyperphosphatemia    As per nephrology.    Nephrology reviewing vein mapping to assess options for internal access placement for dialysis, see note 3/16.    Underwent Fistula placement on  3/21/22.    Vascular surgery following-continue to use for tunnel catheter.  Outpatient follow-up with vascular surgery and for will need a follow-up ultrasound of the fistula in 6 weeks to assess patency.    Started  sevalmer 800mg TID with meals on 3/27/23.    Complained of cramping with dialysis. Tried a dose of oxycodone prior to dialysis but this was not helpful. He will discuss the cramping with nephrology.     Diabetes mellitus type 2  *Hemoglobin A1c on November 2022 was 4.7  *By report, on Lantus insulin in the past.  Blood sugar checks and sliding scale insulin previously discontinued as has been stable without insulin needs.    Monitor with periodic blood sugar checks as needed.    Hypomagnesemia    Resolved with replacement.     Severe malnutrition, protein and calorie type  Moderate dysphagia  G-tube feedings    Continue with tube feeds via PEG tube.    IR replaced the PEG tube on 2/8/2023, monitor.    Nutritionist consulted and following for tube feeds.    SLP following as well. Oral diet as per speech and language therapy (SLP). Patient hopeful to advance to thin liquids soon.    2/20 Nutrition following for tube feeds.    Now undergoing PRN tube feeds.    Encourage high-protein diet.    Nutrition notes reviewed, patient eating 0 to 100% of meals.  Continue current interventions.  As needed tube feed boluses available if patient consumes less than 50% of the meal.    Family encouraging patient to eat high protein diet including protein bars    Family asking about g tube removal. Per the EMR, last tube feed bolus was 3/22 if adequately documented. He refuses his tube feeds often per the dietitician. At this point, would favor keeping it in place given overall poor prognosis with ESRD, cirrhosis of transplanted liver, persistent encephalopathy, and inadequate oral intake. Event if he refuses, he is at risk for developing new infection or other decompensation and thus g tube as safety net.      Anxiety  Fluoxetine was discontinued as per psychiatry recommendation on 2/2 (see consult note for details).    Stable, monitor; comfort and reassurance offered during visit.    Psychiatry follow-up.    Restless leg   Syndrome    Patient started on ropiranole by neurology.       Diet: Room Service  Snacks/Supplements Adult: Other; Gelatein+ with brkfst and lunch, and magic cup with dinner (RD); With Meals  Combination Diet Regular Diet; Mildly Thick (level 2) (OK for fresh fruit (e.g., pineapple) per SLP)  Adult Formula Bolus Feeding: Novasource Renal; Route: Gastrostomy; 3 times daily; Volume per Bolus: 240; mL(s); Back up bolus for when pt consumes <50% of meals: 80 mL/hr x 3 hrs  Calorie Counts    DVT Prophylaxis: DOAC  Nixon Catheter: Not present  Lines: PRESENT      CVC Double Lumen Right External jugular Tunneled-Site Assessment: WDL  Hemodialysis Vascular Access Arteriovenous fistula Left Forearm-Site Assessment: WDL;Bruit present;Thrill present      Cardiac Monitoring: None  Code Status: Full Code      Clinically Significant Risk Factors           # Hypercalcemia: Highest Ca = 10.4 mg/dL in last 2 days, will monitor as appropriate    # Hypoalbuminemia: Lowest albumin = 1.9 g/dL at 1/23/2023  6:38 AM, will monitor as appropriate   # Thrombocytopenia: Lowest platelets = 95 in last 2 days, will monitor for bleeding          # Severe Malnutrition: based on nutrition assessment        Disposition Plan     Expected Discharge Date: 04/12/2023, 12:00 PM  Discharge Delays: Placement - LTC  Destination: inpatient rehabilitation facility  Discharge Comments: Dialysis pt MWF. Will need placement TCU/LTC. [facility] willing to accept once no sitter, SW to follow up when sitter free          Kurtis Butterfield DO  Hospitalist Service  [facility]  Securely message with [employer] (more info)  Text page via [facility] Paging/Directory   ______________________________________________________________________    Interval History    Blanco Osborne was seen this morning.  Had an unwitnessed fall last night, but no major complaints today.   Has a long arm sitter. Brother at the bedside wondering about g tube removal.       Physical Exam   Vital Signs: Temp: 97.2  F (36.2  C) Temp src: Oral BP: 110/67 Pulse: 76   Resp: 16 SpO2: 95 % O2 Device: None (Room air)    Weight: 175 lbs 14.83 oz    Constitutional: no apparent distress  Respiratory: no increased work of breathing, clear to auscultation bilaterally, no crackles or wheezing  Cardiovascular: regular rate and rhythm, normal S1 and S2, no murmur noted  GI: normal bowel sounds, soft, non-distended, non-tender  Skin: warm, dry  Musculoskeletal: no lower extremity pitting edema present  Neurologic: awake, alert, appropriate in conversation, moves all extremities    Medical Decision Making       40 MINUTES SPENT BY ME on the date of service doing chart review, history, exam, documentation & further activities per the note.      Data     I have personally reviewed the following data over the past 24 hrs:    N/A  \   N/A   / N/A     132 (L) 94 (L) 56.4 (H) /  107 (H)   4.1 27 5.84 (H) \       ALT: N/A AST: N/A AP: N/A TBILI: N/A   ALB: 3.6 TOT PROTEIN: N/A LIPASE: N/A

## 2023-04-09 NOTE — PLAN OF CARE
"DATE & TIME: 4/9/23 4847-3484  SUMMARY: Admitted 1/21 as a transfer from [facility] with GAVIN pleural effusions & hepatic encephalopathy.   HX: Liver transplant 2016 due to alcohol abuse, ESRD on HD, AFIB on chronic anticoagulation, seizure, DM II, CVA, CHRISTIAN on BiPAP.  NEURO\" ORIENTATION: A/Ox2. D/O to time & situation  BEHAVIOR & AGGRESSION TOOL COLOR: Green        ABNL VS/O2: VSS on RA  MOBILITY: A1 GBW, LUE 10 lb weight bear limit d/t new fistula    PAIN MANAGMENT: C/O lower back pain this AM; PRN Tylenol & warm packs applied; effective  DIET: Regular diet, mildly thickened liquids (level 2); takes pills without difficulty; on calorie counts.  BOWEL/BLADDER: Pt on HD, 2 BMs today  ABNL LAB/BG: Mag 2.0  DRAIN/DEVICES: R chest CVC, double lumen for hemodialysis; PEG tube clamped; L fistula placed 3/21/23 +B/T  SKIN: Scattered bruising, scabbing, dry/scaly skin, L fistula healing  PROCEDURES: Dialysis MWF  OTHER: Plan to keep G-tube for if needed. Bolus 80ml/hr x3 hours TF ordered if pt consumes <50% of meals.  D/C DATE: Discharge pending TCU placement.   "

## 2023-04-09 NOTE — PROGRESS NOTES
CALORIE COUNT      Approximate Oral Intake for:    4/8  Calories: 1019 kcal  Protein: 34 g protein      Intake from TF/PN:     Currently not being used, pt often refuses       Estimated Needs:    Dosing Weight (3/29) 76.1 kg   Estimated Energy Needs: 4225-8207+ kcals (25-30+ Kcal/Kg)  Justification: dialysis  Estimated Protein Needs: + grams protein (1.2-1.5+ g pro/Kg)  Justification: dialysis      Summary:     - Met 54% estimated energy needs, 37% estimated protein needs met. Please ensure that all snacks provided by family are documented as well.     Encourage PO, pt needs to meet >60% estimated needs for GT removal. (Though, note that patient refuses TF boluses regardless).

## 2023-04-09 NOTE — PLAN OF CARE
Goal Outcome Evaluation:    DATE & TIME: 4/8-4/9 8586-3495  Cognitive Concerns/ Orientation: Disoriented to time/situation, with increased confusion at times; can be impulsive BEHAVIOR & AGGRESSION TOOL COLOR: Green        ABNL VS/O2: VSS, room air  MOBILITY: Assist-1 gait belt/walker; 10 lb weight bear limit on L arm d/t new fistula    PAIN MANAGMENT: Denies  DIET: Regular diet, mildly thickened liquids (level 2); takes pills without difficulty; on calorie counts to try and get gtube out  BOWEL/BLADDER: BM this shift; pt on hemodialysis  ABNL LAB/BG: On dialysis  DRAIN/DEVICES: R chest CVC, double lumen for hemodialysis; R PIV saline locked; PEG tube clamped; L fistula placed 3/21/23 with strong bruit/thrill present--open to air, no redness/swelling  SKIN: Scattered bruising; L forearm skin tear covered; L fistula healing; dry scaly skin  PROCEDURES: Dialysis MWF  D/C DATE: Pending placement to TCU/LTC   OTHER IMPORTANT INFO: Nephrology following; family at bedside.

## 2023-04-09 NOTE — PROGRESS NOTES
Chart reviewed. Labs and vitals reviewed.   Plan for HD tomorrow.   3 hrs, 2-2.5 L UF . 4K EPO   Recent Labs   Lab Test 04/08/23  0859 04/07/23 1959    139   POTASSIUM 3.8 3.6   CHLORIDE 99 97*   CO2 26 28   ANIONGAP 12 14   * 119*   BUN 40.9* 36.2*   CR 4.46* 3.59*   KELLY 9.9 9.5       Maria Fink MD  [facility] - Nephrology   [phone]

## 2023-04-10 LAB
ALBUMIN SERPL BCG-MCNC: 3.4 G/DL (ref 3.5–5.2)
ANION GAP SERPL CALCULATED.3IONS-SCNC: 15 MMOL/L (ref 7–15)
BUN SERPL-MCNC: 72.9 MG/DL (ref 6–20)
CALCIUM SERPL-MCNC: 10.2 MG/DL (ref 8.6–10)
CHLORIDE SERPL-SCNC: 94 MMOL/L (ref 98–107)
CREAT SERPL-MCNC: 6.67 MG/DL (ref 0.67–1.17)
DEPRECATED HCO3 PLAS-SCNC: 23 MMOL/L (ref 22–29)
GFR SERPL CREATININE-BSD FRML MDRD: 9 ML/MIN/1.73M2
GLUCOSE SERPL-MCNC: 132 MG/DL (ref 70–99)
MAGNESIUM SERPL-MCNC: 2 MG/DL (ref 1.7–2.3)
PHOSPHATE SERPL-MCNC: 6.8 MG/DL (ref 2.5–4.5)
POTASSIUM SERPL-SCNC: 4.6 MMOL/L (ref 3.4–5.3)
SODIUM SERPL-SCNC: 132 MMOL/L (ref 136–145)

## 2023-04-10 PROCEDURE — 80069 RENAL FUNCTION PANEL: CPT | Performed by: STUDENT IN AN ORGANIZED HEALTH CARE EDUCATION/TRAINING PROGRAM

## 2023-04-10 PROCEDURE — 120N000001 HC R&B MED SURG/OB

## 2023-04-10 PROCEDURE — 250N000011 HC RX IP 250 OP 636: Performed by: INTERNAL MEDICINE

## 2023-04-10 PROCEDURE — 250N000013 HC RX MED GY IP 250 OP 250 PS 637: Performed by: PSYCHIATRY & NEUROLOGY

## 2023-04-10 PROCEDURE — 634N000001 HC RX 634: Performed by: INTERNAL MEDICINE

## 2023-04-10 PROCEDURE — 250N000013 HC RX MED GY IP 250 OP 250 PS 637: Performed by: HOSPITALIST

## 2023-04-10 PROCEDURE — 250N000013 HC RX MED GY IP 250 OP 250 PS 637: Performed by: STUDENT IN AN ORGANIZED HEALTH CARE EDUCATION/TRAINING PROGRAM

## 2023-04-10 PROCEDURE — 83735 ASSAY OF MAGNESIUM: CPT | Performed by: STUDENT IN AN ORGANIZED HEALTH CARE EDUCATION/TRAINING PROGRAM

## 2023-04-10 PROCEDURE — 258N000003 HC RX IP 258 OP 636: Performed by: INTERNAL MEDICINE

## 2023-04-10 PROCEDURE — 90937 HEMODIALYSIS REPEATED EVAL: CPT

## 2023-04-10 PROCEDURE — 250N000012 HC RX MED GY IP 250 OP 636 PS 637: Performed by: STUDENT IN AN ORGANIZED HEALTH CARE EDUCATION/TRAINING PROGRAM

## 2023-04-10 PROCEDURE — 90935 HEMODIALYSIS ONE EVALUATION: CPT | Performed by: INTERNAL MEDICINE

## 2023-04-10 PROCEDURE — 36592 COLLECT BLOOD FROM PICC: CPT | Performed by: STUDENT IN AN ORGANIZED HEALTH CARE EDUCATION/TRAINING PROGRAM

## 2023-04-10 PROCEDURE — 99232 SBSQ HOSP IP/OBS MODERATE 35: CPT | Performed by: INTERNAL MEDICINE

## 2023-04-10 RX ADMIN — HEPARIN SODIUM 1900 UNITS: 1000 INJECTION INTRAVENOUS; SUBCUTANEOUS at 11:35

## 2023-04-10 RX ADMIN — RIFAXIMIN 550 MG: 550 TABLET ORAL at 19:57

## 2023-04-10 RX ADMIN — OLANZAPINE 5 MG: 5 TABLET, ORALLY DISINTEGRATING ORAL at 07:56

## 2023-04-10 RX ADMIN — LACOSAMIDE 100 MG: 100 TABLET, FILM COATED ORAL at 12:12

## 2023-04-10 RX ADMIN — Medication 1 CAPSULE: at 12:12

## 2023-04-10 RX ADMIN — MIDODRINE HYDROCHLORIDE 10 MG: 5 TABLET ORAL at 12:12

## 2023-04-10 RX ADMIN — RIFAXIMIN 550 MG: 550 TABLET ORAL at 07:53

## 2023-04-10 RX ADMIN — SEVELAMER CARBONATE 800 MG: 800 TABLET, FILM COATED ORAL at 18:08

## 2023-04-10 RX ADMIN — PANTOPRAZOLE SODIUM 40 MG: 40 TABLET, DELAYED RELEASE ORAL at 07:52

## 2023-04-10 RX ADMIN — SODIUM CHLORIDE 250 ML: 9 INJECTION, SOLUTION INTRAVENOUS at 11:32

## 2023-04-10 RX ADMIN — RAMELTEON 8 MG: 8 TABLET, FILM COATED ORAL at 19:57

## 2023-04-10 RX ADMIN — GUAIFENESIN 600 MG: 600 TABLET, EXTENDED RELEASE ORAL at 07:52

## 2023-04-10 RX ADMIN — LEVETIRACETAM 1000 MG: 500 TABLET, FILM COATED ORAL at 21:53

## 2023-04-10 RX ADMIN — MIDODRINE HYDROCHLORIDE 10 MG: 5 TABLET ORAL at 18:08

## 2023-04-10 RX ADMIN — GUAIFENESIN 600 MG: 600 TABLET, EXTENDED RELEASE ORAL at 19:57

## 2023-04-10 RX ADMIN — APIXABAN 5 MG: 5 TABLET, FILM COATED ORAL at 19:57

## 2023-04-10 RX ADMIN — TACROLIMUS 1 MG: 1 CAPSULE ORAL at 07:53

## 2023-04-10 RX ADMIN — APIXABAN 5 MG: 5 TABLET, FILM COATED ORAL at 07:53

## 2023-04-10 RX ADMIN — SEVELAMER CARBONATE 800 MG: 800 TABLET, FILM COATED ORAL at 07:52

## 2023-04-10 RX ADMIN — TACROLIMUS 1 MG: 1 CAPSULE ORAL at 19:57

## 2023-04-10 RX ADMIN — HEPARIN SODIUM 1900 UNITS: 1000 INJECTION INTRAVENOUS; SUBCUTANEOUS at 11:33

## 2023-04-10 RX ADMIN — EPOETIN ALFA-EPBX 4000 UNITS: 4000 INJECTION, SOLUTION INTRAVENOUS; SUBCUTANEOUS at 11:32

## 2023-04-10 RX ADMIN — SEVELAMER CARBONATE 800 MG: 800 TABLET, FILM COATED ORAL at 12:47

## 2023-04-10 RX ADMIN — ROPINIROLE HYDROCHLORIDE 0.5 MG: 0.25 TABLET, FILM COATED ORAL at 21:53

## 2023-04-10 RX ADMIN — ACETAMINOPHEN 650 MG: 325 TABLET, FILM COATED ORAL at 12:12

## 2023-04-10 RX ADMIN — OLANZAPINE 5 MG: 5 TABLET, ORALLY DISINTEGRATING ORAL at 21:53

## 2023-04-10 RX ADMIN — Medication: at 11:34

## 2023-04-10 RX ADMIN — MIDODRINE HYDROCHLORIDE 10 MG: 5 TABLET ORAL at 07:53

## 2023-04-10 RX ADMIN — SODIUM CHLORIDE 300 ML: 9 INJECTION, SOLUTION INTRAVENOUS at 11:32

## 2023-04-10 RX ADMIN — FOLIC ACID 1 MG: 1 TABLET ORAL at 07:53

## 2023-04-10 ASSESSMENT — ACTIVITIES OF DAILY LIVING (ADL)
ADLS_ACUITY_SCORE: 48
ADLS_ACUITY_SCORE: 48
ADLS_ACUITY_SCORE: 49
ADLS_ACUITY_SCORE: 48
ADLS_ACUITY_SCORE: 49
ADLS_ACUITY_SCORE: 48
ADLS_ACUITY_SCORE: 49
ADLS_ACUITY_SCORE: 49

## 2023-04-10 NOTE — PROGRESS NOTES
[facility]     Renal Progress Note       SHORTHAND KEY FOR MY NOTES:  c = with, s = without, p = after, a = before, x = except, asx = asymptomatic, tx = transplant or treatment, sx = symptoms or symptomatic, cx = canceled or culture, rxn = reaction, yday = yesterday, nl = normal, abx = antibiotics, fxn = function, dx = diagnosis, dz = disease, m/h = melena/hematochezia, c/d/l/ha = cramping/dizziness/lightheadedness/headache, d/c = discharge or diarrhea/constipation, f/c/n/v = fevers/chills/nausea/vomiting, cp/sob = chest pain/shortness of breath, tbv = total body volume, rxn = reaction, tdc = tunneled dialysis catheter, pta = prior to admission, hd = hemodialysis, pd = peritoneal dialysis, hhd = home hemodialysis, edw = estimated dry wt         Assessment/Plan:     1.  ESKD.  Pt dialysing per a MWF schedule.  No issues c the run.  A.  Next HD on Weds.    2.  Liver tx.  Pt is on tac.    A.  Continue tac and check levels periodically.    3.  Hypotension.  He is on chronic midodrine.  BP is ok and he is asx.  A.  Midodrine 10 mg tid scheduled. Ok to use an additional dose during HD prn.  B.  Follow BPs while pulling fluid to avoid significant hypotension.    4.  FEN.  Ca is a little high, but stable.  Immobilization is the most likely etiology.  A.  Follow electrolytes.        Interval History:     Pt states that he has some cramping in his R arm right now, but no d/l.  He is requesting some pain meds.  No other issues c HD.           Medications and Allergies:       - MEDICATION INSTRUCTIONS for Dialysis Patients -   Does not apply See Admin Instructions     sodium chloride 0.9%  250 mL Intravenous Once in dialysis/CRRT     sodium chloride 0.9%  300 mL Hemodialysis Machine Once     sodium chloride 0.9%  250 mL Intravenous Once in dialysis/CRRT     sodium chloride 0.9%  300 mL Hemodialysis Machine Once     apixaban ANTICOAGULANT  5 mg Oral BID     epoetin mirta-epbx  4,000 Units Intravenous  Once in dialysis/CRRT     folic acid  1 mg Oral or Feeding Tube Daily     guaiFENesin  600 mg Oral BID     sodium chloride (PF) 0.9%  10 mL Intracatheter Once in dialysis/CRRT    Followed by     heparin  1.3-2.6 mL Intracatheter Once in dialysis/CRRT     sodium chloride (PF) 0.9%  10 mL Intracatheter Once in dialysis/CRRT    Followed by     heparin  1.3-2.6 mL Intracatheter Once in dialysis/CRRT     lacosamide  100 mg Oral Q12H     lactulose  10 g Oral BID     levETIRAcetam  1,000 mg Oral Q24H     lidocaine   Transdermal Q8H BIJU     menthol   Transdermal Q8H     midodrine  10 mg Oral or Feeding Tube TID w/meals     multivitamin RENAL  1 capsule Oral Daily     - MEDICATION INSTRUCTIONS -   Does not apply Once     OLANZapine zydis  5 mg Oral At Bedtime     OLANZapine zydis  5 mg Oral Once per day on Mon Wed Fri     pantoprazole  40 mg Oral QAM AC     ramelteon  8 mg Oral QPM     rifaximin  550 mg Oral or Feeding Tube BID     rOPINIRole  0.5 mg Oral At Bedtime     sevelamer carbonate  800 mg Oral TID w/meals     sodium chloride (PF)  3 mL Intracatheter Q8H     sodium chloride (PF)  9 mL Intracatheter During Dialysis/CRRT (from stock)     sodium chloride (PF)  9 mL Intracatheter During Dialysis/CRRT (from stock)     tacrolimus  1 mg Oral Q12H     No Known Allergies       Physical Exam:     Vitals were reviewed     , Blood pressure 102/71, pulse 85, temperature 97.6  F (36.4  C), resp. rate (!) 33, weight 82.2 kg (181 lb 4.8 oz), SpO2 94 %.  Wt Readings from Last 3 Encounters:   04/10/23 82.2 kg (181 lb 4.8 oz)   01/21/23 82.4 kg (181 lb 11.2 oz)   12/28/22 96 kg (211 lb 10.3 oz)     Intake/Output Summary (Last 24 hours) at 4/10/2023 0932  Last data filed at 4/10/2023 0800  Gross per 24 hour   Intake 960 ml   Output --   Net 960 ml     GENERAL APPEARANCE: pleasant, lying in bed on R side, looks uncomfortable  HEENT:  eyes/ears/nose/neck grossly nl  RESP: CTA B c good efforts  CV: RRR, nl S1/S2   ABDOMEN: s/nt/nd    EXTREMITIES/SKIN: no significant ble edema  ACCESS:  RIJ TDC - site ok    Pt seen on HD. Stable run expected despite low BPs. -2.5L UF goal.  Good BFR via RIJ TDC.         Data:     CBC RESULTS:     Recent Labs   Lab 04/08/23  0859 04/05/23  0959 04/03/23  1121   WBC 3.4* 3.6* 3.3*   RBC 3.00* 2.84* 3.30*   HGB 9.0* 8.4* 9.9*   HCT 30.3* 28.2* 31.8*   PLT 95* 97* 118*     Basic Metabolic Panel:  Recent Labs   Lab 04/10/23  0825 04/09/23  1043 04/08/23  0859 04/07/23  1959 04/06/23  0808 04/05/23  0959   * 132* 137 139 137 134*   POTASSIUM 4.6 4.1 3.8 3.6 3.8 4.0   CHLORIDE 94* 94* 99 97* 99 95*   CO2 23 27 26 28 25 24   BUN 72.9* 56.4* 40.9* 36.2* 47.1* 66.0*   CR 6.67* 5.84* 4.46* 3.59* 4.89* 5.97*   * 107* 100* 119* 103* 135*   KELLY 10.2* 10.4* 9.9 9.5 10.2* 10.2*     INRNo lab results found in last 7 days.   Attestation:   I have reviewed today's relevant vital signs, notes, medications, labs and imaging.    Thierry Reddy MD  [facility] - Nephrology  [phone]

## 2023-04-10 NOTE — PROGRESS NOTES
Potassium   Date Value Ref Range Status   04/10/2023 4.6 3.4 - 5.3 mmol/L Final   12/29/2022 3.4 3.4 - 5.3 mmol/L Final   04/20/2020 3.9 3.4 - 5.3 mmol/L Final     Potassium POCT   Date Value Ref Range Status   01/19/2023 3.6 3.5 - 5.0 mmol/L Final     Hemoglobin   Date Value Ref Range Status   04/08/2023 9.0 (L) 13.3 - 17.7 g/dL Final   04/20/2020 14.3 13.3 - 17.7 g/dL Final     Creatinine   Date Value Ref Range Status   04/10/2023 6.67 (H) 0.67 - 1.17 mg/dL Final   04/20/2020 1.06 0.66 - 1.25 mg/dL Final     Urea Nitrogen   Date Value Ref Range Status   04/10/2023 72.9 (H) 6.0 - 20.0 mg/dL Final   12/29/2022 46 (H) 7 - 30 mg/dL Final   04/20/2020 23 7 - 30 mg/dL Final     Sodium   Date Value Ref Range Status   04/10/2023 132 (L) 136 - 145 mmol/L Final   04/20/2020 139 133 - 144 mmol/L Final     INR   Date Value Ref Range Status   12/21/2022 1.36 (H) 0.85 - 1.15 Final   10/07/2019 1.20 (H) 0.86 - 1.14 Final       DIALYSIS PROCEDURE NOTE  Hepatitis status of previous patient on machine log was checked and verified ok to use with this patients hepatitis status.  Patient dialyzed for 3 hrs. on a K3 bath with a net fluid removal of 2.5L.  A BFR of 400 ml/min was obtained via a tunnelled RIJ catheter.   The treatment plan was discussed with  during the treatment.    Total heparin received during the treatment: 0 units.  Line flushed, clamped and capped with heparin 1:1000 1.9 mL (1900 units) per lumen     Meds given: Epoetin 4,000units given     Complications: None    Person educated: patient. Knowledge base substantial. Barriers to learning: forgetful. Educated on access care., medication and procedure via verbal mode. Patient verbalized understanding. Pt prefers verbal education style.      ICEBOAT? Timeout performed pre-treatment  I: Patient was identified using 2 identifiers  C:  Consent Signed Yes  E: Equipment preventative maintenance is current and dialysis delivery system OK to use  B: Hepatitis B  Surface Antigen: Neg; Draw Date: 3/29/2023      Hepatitis B Surface Antibody: Susceptible; Draw Date: 3/29/2023  O: Dialysis orders present and complete prior to treatment  A: Vascular access verified and assessed prior to treatment  T: Treatment was performed at a clinically appropriate time  ?: Patient was allowed to ask questions and address concerns prior to treatment  See Adult Hemodialysis flowsheet in EPIC for further details and post assessment.  Machine water alarm in place and functioning. Transducer pods intact and checked every 15min.   Pt returned via bed transport.  Chlorine/Chloramine water system checked every 4 hours.  Outpatient Dialysis TBD  Patient repositioned every 2 hours during the treatment.  Post treatment report given to Gulshan LUZ RN regarding 2.5L of fluid removed, last BP of 106/74, and patient pain rating of 4/10 lower back pain.     Please remove patient dressing on AVF and AVG needle sites 24 hours after dialysis. If leaking occurs please apply a Band-Aid.

## 2023-04-10 NOTE — PLAN OF CARE
"Goal Outcome Evaluation:    DATE & TIME: 4/9-4/10 [id number]  SUMMARY: Admitted 1/21 as a transfer from [facility] with GAVIN pleural effusions & hepatic encephalopathy.   HX: Liver transplant 2016 due to alcohol abuse, ESRD on HD, AFIB on chronic anticoagulation, seizure, DM II, CVA, CHRISTIAN on BiPAP.  NEURO\" ORIENTATION: A/Ox2. D/O to time & situation  BEHAVIOR & AGGRESSION TOOL COLOR: Green        ABNL VS/O2: VSS on RA  MOBILITY: A1 GBW, LUE 10 lb weight bear limit d/t new fistula    PAIN MANAGMENT: Denies  DIET: Regular diet, mildly thickened liquids (level 2); takes pills without difficulty; on calorie counts.  BOWEL/BLADDER: Pt on HD, 1 BMs today  ABNL LAB/BG:  DRAIN/DEVICES: R chest CVC, double lumen for hemodialysis; PEG tube clamped; L fistula placed 3/21/23 +B/T  SKIN: Scattered bruising, scabbing, dry/scaly skin, L fistula healing  PROCEDURES: Dialysis MWF  OTHER: Plan to keep G-tube for if needed. Bolus 80ml/hr x3 hours TF ordered if pt consumes <50% of meals.  D/C DATE: Discharge pending TCU placement.   "

## 2023-04-10 NOTE — PLAN OF CARE
Goal Outcome Evaluation:     DATE & TIME: 4/10 [id number]  NEURO/ORIENTATION: Disoriented to time; forgetful; confused  BEHAVIOR & AGGRESSION TOOL COLOR: Green; impulsive        ABNL VS/O2: VSS on RA; BP's upper 90's-100's during and after dialysis, on Midodrine.  MOBILITY: A1 GBW,    PAIN MANAGMENT: low back pain; hot packs applied in dialysis, tylenol when he returned with relief.  DIET: Regular diet, mildly thickened liquids (level 2); takes pills without difficulty; on calorie counts; fair intake; family also brought in some food  BOWEL/BLADDER: dialysis M-W-F; anuric; 1 BMs this shift (had 2 early morning - the lactulose was held this morning)  ABNL LAB/BG:Na 132, ca 10.2, BUN 72.9 Cr 6.67, phos 6.8  DRAIN/DEVICES: R chest CVC, double lumen for hemodialysis; PEG tube clamped; L fistula placed 3/21/23 +Bruit/Thrill  SKIN: Scattered bruising, scabbing, dry/scaly skin, L fistula site healing  PROCEDURES: Dialysis this morning, 2.5 L removed and tolerated well.  OTHER: Plan to keep G-tube for if needed. Bolus 80ml/hr x3 hours TF ordered if pt consumes <50% of meals.  D/C DATE: Discharge pending TCU or ARU placement.

## 2023-04-10 NOTE — PROGRESS NOTES
[facility]    Medicine Progress Note - Hospitalist Service    Date of Admission:  1/21/2023    Assessment & Plan   Blanco Osborne is a 57 year-old male admitted on 1/21/2023 as a transfer from [facility] for evaluation of loculated pleural effusion. He has extensive PMH including h/o liver transplant 2016 due to alcohol abuse, pAF on chronic anticoagulation, history of diabetes mellitus type 2, CVA, hypertension, CHRISTIAN on BiPAP.  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 He was recovering in [facility] and was transferred to [facility] for consideration of chest tube placement and possible intrapleural lysis for worsening effusion.     Acute metabolic encephalopathy with delirium, multifactorial, slowly improving?  Hepatic encephalopathy  Chronic liver disease, history of liver transplant 2016  End-stage renal disease (ESRD), on hemodialysis (HD)  History of seizure, on Keppra and Vimpat  Waxing and waning mentation, coinciding with lactulose being held at [facility]  Earlier in hospital stay, remained confused and had pulled out tracheostomy tube, PICC line and pulled his dialysis catheter.  Lines replaced.  Psychiatry consulted, recent follow-up 2/21, 2/28, see notes.    Mental status continues to fluctuate with periods of alertness and increased sleepiness, overall improving.    Complicated, prolonged hospital course with mental status concerns multifactorial related to hepatic encephalopathy, end-stage renal disease on dialysis, past PEA arrest, seizure disorder, medication, and chronic liver disease with prior liver transplant.  As mental status fluctuates will continue to require one-to-one sitter as needed for added safety and supervision.  Reassess daily.    Continue to reorient and  redirect as able.  Avoid restraints if possible with goals of safety and close supervision (given multiple lines and tubes - dialysis catheter, PEG tube, IV, etc).    Maintain normal day/night cycles and sleep-wake cycles.  Minimize sedating medications as able.  Remains weak and deconditioned with complex, prolonged hospital course and limited mobility.  Encouragement and support offered daily to patient.    Continue Lactulose to 10 mg BID, monitor for symptoms; goal to have 2-3 bowel movements per day.    Ongoing hemodialysis, as per nephrology, 3X/week dialysis schedule.    Nephrology following    Continue ramelteon 8 mg at bedtime; monitor for side effects including AM sedation, reassess daily.    Started on olanzapine 5 mg BID and PRN earlier during this hospitalization. Delirium improved, discontinued AM dose of olanzapine on non-dialysis days.    Seizure disorder stable, continue Keppra and Vimpat; monitor, no recurrent seizures reported of recent.    Reconsult psychiatry as needed.    Neurology consult appreciated.  Patient has been started on ropinirole for restless leg syndrome.     Bilateral pleural effusions   Acute respiratory failure requiring tracheostomy  - resolved    Pneumonia  - resolved   ARDS  - resolved    Right pneumothorax - resolved   *Initial event was parainfluenza and strep pneumo pneumonia back in November 2022. Treated for ARDS. Had failed extubation x2 and tracheostomy performed on previous hospitalization. *Had additional complications of bilateral pneumothoraces as well as hydropneumothorax- pleural fluid grew candida parapsilosis  *Completed 4-week antifungal treatment. While in [facility] he was successfully weaned from the ventilator.  *Recently there were concern for worsening effusion while at [facility] and had thoracentesis 01/13 which returned exudative without growth on cultures. CT chest/abdomen/pelvis on 01/18 demonstrated worsening effusions and concerns for infected parapneumonic  effusions with possible SBP.  Multiple pulmonologist at [facility] reviewed CT scan recommended transfer to [facility] for consideration of chest tube placement and possible intrapleural lysis  *Thoracic surgery (Dr. Jackson) consulted during admission   *CT chest 1/22, small effusions on imaging and so chest tube was not inserted.   ID consulted, see note 2/10.  *Patient pulled out his tracheostomy tube on the night 2/1-2/2 and is tolerating well without increased shortness of breath persistent cough or worsening hypoxia.  * Chest x-ray 2/3 with cardiomegaly, chronic small bilateral pleural effusions, no pulmonary edema; right internal jugular dialysis catheter in place.   3/12 Pulmonary consult, see note, concerns of hypercapnia, atelectasis, with consideration of BiPAP trial; no recommendations for antibiotics with infiltrates felt to be atelectasis.    Stable, continue to monitor respiratory status, recently stable on room air; occasional cough reported.    Encourage incentive spirometry as able, up to chair, deep breathing.    Wound care previously consulted for tracheostomy site care, monitor for signs and symptoms of infection, healing well - resolved.    Encourage ambulation.     Possible splenic infract  Wedge shaped area on CT scan chest and CT abdomen on 3/7/23.    Hematology consulted, suspicion for infarct was low.    TTE no thrombus or vegetations.    No abdominal pain CTM.     S/p liver transplant 2016   Chronic immunosuppression, on tacrolimus  Cirrhosis with ascites  Subacute bacterial peritonitis (SBP), resolved  *Main manifestations of this are hepatic encephalopathy and ascites.  Hepatologist at [place] felt that most likely reason for the cirrhosis was metabolic syndrome or alcohol intake.  There was no evidence of rejection on biopsy and there was some evidence of regeneration. Paracentesis done on 12/22/2022 showed lactobacillus indicating SBP, treated with Unasyn and Augmentin, finished 2-week  "course 1/12/2023.  Requires large-volume paracentesis periodically for recurring ascites.    *Paracentesis 1/13/2023 yielded about 7500 cc. Cultures NGTD. paracentesis on 1/24 with 4.8 L fluid removal  Paracentesis on 3/6/23 No SBP    Continue intermittent paracentesis as needed if develops symptomatic ascites.    Monitor for signs and symptoms of recurrent spontaneous bacterial peritonitis.    Further treatment for recurrent ascites with TIPS deferred to his Liver Transplant team and/or Hepatology, he has an appointment on 4/21/2023 with Hepatology, Dr. Simms.    Continue Tacrolimus 1 mg BID, rifaximin 550 mg BID.    Continue lactulose as ordered, titrate as needed to have 2-3 bms.    GI consult as needed in hospital for new acute issues, otherwise as outpatient.    Encourgae high protein diet.    CT abdomen on March 9, 2023 showed small to moderate ascites.     Goals of care   As per prior rounding provider, \"on 1/25 nursing had mentioned that patient had refused to undergo dialysis and want to stop care, palliative care was consulted.  Patient and his spouse denied wanting to stop care and wanted ongoing restorative care including full code\"    Monitor, Full Code status.    Needs placement to TCU -> SW working on placement     Diarrhea, improved, on lactulose for chronic liver disease    C difficile negative on 1/31. Secondary to lactulose. Discontinued rectal tube (2/12) as stools more formed.    Continue lactulose with goal of 2 to 3 soft bowel movement in 24 hours.     Paroxysmal atrial fibrillation  Chronic anticoagulation, apixaban  History of embolic strokes  Remains in sinus rhythm, on anticoagulation with apixaban indefinitely for stroke prophylaxis.      Monitor rhythm.    Continue apixaban anticoagulation, it was briefly held for fistula placement.    Monitor hemoglobin, see below.     Acute anemia  Patient has required intermittent transfusions for on-going anemia.  Anemia most likely secondary to " critical illness/chronic disease, chronic renal disease.  Baseline hemoglobin around 7-8.  Likely Epo resistance.    Hemoglobin stable at 8.4 on 3/30/23.    Monitor intermittently.     Hypercalcemia  Likhi hypercalcemia of Immobility    Nephrology following.    Hold VIT D.    Continue to hold Phoslo.    Monitor labs.    Encourage ambulation.    Nephrology considering prolia, however patient's wife would like to hold off for now.     History PEA arrest   PEA arrest prior to his previous admission and 1 episode of PEA associated with desaturation on 12/20.  No structural cardiac disease.     Stable, continue cardiac Monitoring.     Chronic Hypotension  In the past has developed baseline hypotension with cirrhosis and prolonged critical illness.  On hemodialysis.  Receiving midodrine and albumin during dialysis.    Continue midodrine, as per nephrology.      History of seizure   After hypoxic event, in the context of baseline multifactorial encephalopathy secondary to his ongoing critical illness and prior cardiac arrests and prior strokes and cirrhosis with hepatic encephalopathy. MRI of head of 12/20/22 reveals no PRES or leptomeningeal enhancement but scattered.  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   No recent seizure activity.    Continue levetiracetam, lacosamide.    Seizure precautions.    Outpatient follow-up with neurology, consult inpatient if needed.     ESRD  Anemia due to renal disease  Hypotension during dialysis  Hyperphosphatemia    As per nephrology.    Nephrology reviewing vein mapping to assess options for internal access placement for dialysis, see note 3/16.    Underwent Fistula placement on  3/21/22.    Vascular surgery following-continue to use for tunnel catheter.  Outpatient follow-up with vascular surgery and for will need a follow-up ultrasound of the fistula in 6 weeks to assess patency.    Started sevalmer 800mg TID with meals on  3/27/23.    Complained of cramping with dialysis. Tried a dose of oxycodone prior to dialysis but this was not helpful. He will discuss the cramping with nephrology.     Diabetes mellitus type 2  *Hemoglobin A1c on November 2022 was 4.7  *By report, on Lantus insulin in the past.  Blood sugar checks and sliding scale insulin previously discontinued as has been stable without insulin needs.    Monitor with periodic blood sugar checks as needed.     Hypomagnesemia    Resolved with replacement.     Severe malnutrition, protein and calorie type  Moderate dysphagia  G-tube feedings    Continue with tube feeds via PEG tube.    IR replaced the PEG tube on 2/8/2023, monitor.    Nutritionist consulted and following for tube feeds.    SLP following as well. Oral diet as per speech and language therapy (SLP). Patient hopeful to advance to thin liquids soon.    2/20 Nutrition following for tube feeds.    Now undergoing PRN tube feeds.    Encourage high-protein diet.    Nutrition notes reviewed, patient eating 0 to 100% of meals.  Continue current interventions.  As needed tube feed boluses available if patient consumes less than 50% of the meal.    Family encouraging patient to eat high protein diet including protein bars    Family asking about g tube removal. Per the EMR, last tube feed bolus was 3/22 if adequately documented. He refuses his tube feeds often per the dietitician. At this point, would favor keeping it in place given overall poor prognosis with ESRD, cirrhosis of transplanted liver, persistent encephalopathy, and inadequate oral intake. Event if he refuses, he is at risk for developing new infection or other decompensation and thus g tube as safety net.      Anxiety  Fluoxetine was discontinued as per psychiatry recommendation on 2/2 (see consult note for details).    Stable, monitor; comfort and reassurance offered during visit.    Psychiatry follow-up.     Restless leg  Syndrome    Patient started on ropiranole  by neurology.       Diet: Snacks/Supplements Adult: Other; Gelatein+ with brkfst and lunch, and magic cup with dinner (RD); With Meals  Combination Diet Regular Diet; Mildly Thick (level 2) (OK for fresh fruit (e.g., pineapple) per SLP)  Adult Formula Bolus Feeding: Novasource Renal; Route: Gastrostomy; 3 times daily; Volume per Bolus: 240; mL(s); Back up bolus for when pt consumes <50% of meals: 80 mL/hr x 3 hrs  Calorie Counts  Room Service    DVT Prophylaxis: DOAC  Nixon Catheter: Not present  Lines: PRESENT      CVC Double Lumen Right External jugular Tunneled-Site Assessment: WDL  Hemodialysis Vascular Access Arteriovenous fistula Left Forearm-Site Assessment: WDL;Bruit present;Thrill present      Cardiac Monitoring: None  Code Status: Full Code      Clinically Significant Risk Factors           # Hypercalcemia: Highest Ca = 10.4 mg/dL in last 2 days, will monitor as appropriate    # Hypoalbuminemia: Lowest albumin = 1.9 g/dL at 1/23/2023  6:38 AM, will monitor as appropriate            # Severe Malnutrition: based on nutrition assessment        Disposition Plan      Expected Discharge Date: 04/19/2023, 12:00 PM  Discharge Delays: Placement - LTC  Destination: inpatient rehabilitation facility  Discharge Comments: Dialysis pt MWF. Will need placement TCU/LTC. [facility] willing to accept once no sitter, SW to follow up when sitter free          Pako Tan MD  Hospitalist Service  [facility]  Securely message with [employer] (more info)  Text page via [employer] Paging/Directory   ______________________________________________________________________    Interval History   C/o itching   No other new complaints    Physical Exam   /63 (BP Location: Right arm, Patient Position: Semi-Salgado's, Cuff Size: Adult Regular)   Pulse 69   Temp 97.2  F (36.2  C) (Oral)   Resp 20   Wt 81.4 kg (179 lb 8 oz)   SpO2 96%   BMI 24.34 kg/m    Gen- pleasant   Neck- supple  CVS- I+II+ no m/r/g  RS-  CTAB  Abdo- soft, no tenderness . No g/r/r  Ext- no edema     Medical Decision Making       51 MINUTES SPENT BY ME on the date of service doing chart review, history, exam, documentation & further activities per the note.      Data   ------------------------- PAST 24 HR DATA REVIEWED -----------------------------------------------    I have personally reviewed the following data over the past 24 hrs:    N/A  \   N/A   / N/A     132 (L) 94 (L) 72.9 (H) /  132 (H)   4.6 23 6.67 (H) \       ALT: N/A AST: N/A AP: N/A TBILI: N/A   ALB: 3.4 (L) TOT PROTEIN: N/A LIPASE: N/A

## 2023-04-10 NOTE — PROGRESS NOTES
CALORIE COUNT      Approximate Oral Intake for: 4/9     Calories: 1443 kcal   Protein: 71 g protein      Number of Meals Recorded: 3      Number of Snacks Recorded: 3 supplements      TF providing:     Bolus 80ml/hr x3 hours TF ordered if pt consumes <50% of meals  Novasource renal 240 mL formula TID or 720 mL/day = 1440 kcal, 66 g protein, 132 g CHO, 0 g fiber and 516 mL free water         Dosing wt: 76.1 kg (3/29)  Estimated Needs:    Calories: 8916-6513+ (25-30 kcal/kg)  Protein: + grams (1.2-1.5+ g/kg protein)      Summary:   Yesterday pt met 75% of min kcal needs and 78% of min protein needs.   50-75% intakes documented. Family brought meal per flowsheet    Please ensure that all snacks provided by family are documented as well.     Encourage PO, pt needs to meet >60% estimated needs for GT removal. (Though, note that patient refuses TF boluses regardless).     MD note yesterday-- At this point, would favor keeping it in place given overall poor prognosis with ESRD, cirrhosis of transplanted liver, persistent encephalopathy, and inadequate oral intake.     3-day summary to follow     Cady Epstein RD, LD

## 2023-04-11 ENCOUNTER — APPOINTMENT (OUTPATIENT)
Dept: SPEECH THERAPY | Facility: CLINIC | Age: 58
DRG: 981 | End: 2023-04-11
Attending: STUDENT IN AN ORGANIZED HEALTH CARE EDUCATION/TRAINING PROGRAM
Payer: COMMERCIAL

## 2023-04-11 LAB
ALBUMIN SERPL BCG-MCNC: 3.6 G/DL (ref 3.5–5.2)
ANION GAP SERPL CALCULATED.3IONS-SCNC: 12 MMOL/L (ref 7–15)
BUN SERPL-MCNC: 51.1 MG/DL (ref 6–20)
CALCIUM SERPL-MCNC: 10 MG/DL (ref 8.6–10)
CHLORIDE SERPL-SCNC: 95 MMOL/L (ref 98–107)
CREAT SERPL-MCNC: 4.42 MG/DL (ref 0.67–1.17)
DEPRECATED HCO3 PLAS-SCNC: 26 MMOL/L (ref 22–29)
GFR SERPL CREATININE-BSD FRML MDRD: 15 ML/MIN/1.73M2
GLUCOSE SERPL-MCNC: 86 MG/DL (ref 70–99)
PHOSPHATE SERPL-MCNC: 4.4 MG/DL (ref 2.5–4.5)
POTASSIUM SERPL-SCNC: 4.3 MMOL/L (ref 3.4–5.3)
SODIUM SERPL-SCNC: 133 MMOL/L (ref 136–145)

## 2023-04-11 PROCEDURE — 36415 COLL VENOUS BLD VENIPUNCTURE: CPT | Performed by: STUDENT IN AN ORGANIZED HEALTH CARE EDUCATION/TRAINING PROGRAM

## 2023-04-11 PROCEDURE — 250N000013 HC RX MED GY IP 250 OP 250 PS 637: Performed by: STUDENT IN AN ORGANIZED HEALTH CARE EDUCATION/TRAINING PROGRAM

## 2023-04-11 PROCEDURE — 92526 ORAL FUNCTION THERAPY: CPT | Mod: GN

## 2023-04-11 PROCEDURE — 84132 ASSAY OF SERUM POTASSIUM: CPT | Performed by: STUDENT IN AN ORGANIZED HEALTH CARE EDUCATION/TRAINING PROGRAM

## 2023-04-11 PROCEDURE — 250N000013 HC RX MED GY IP 250 OP 250 PS 637: Performed by: PSYCHIATRY & NEUROLOGY

## 2023-04-11 PROCEDURE — 250N000013 HC RX MED GY IP 250 OP 250 PS 637: Performed by: HOSPITALIST

## 2023-04-11 PROCEDURE — 120N000001 HC R&B MED SURG/OB

## 2023-04-11 PROCEDURE — 250N000012 HC RX MED GY IP 250 OP 636 PS 637: Performed by: STUDENT IN AN ORGANIZED HEALTH CARE EDUCATION/TRAINING PROGRAM

## 2023-04-11 RX ADMIN — LACOSAMIDE 100 MG: 100 TABLET, FILM COATED ORAL at 01:05

## 2023-04-11 RX ADMIN — PANTOPRAZOLE SODIUM 40 MG: 40 TABLET, DELAYED RELEASE ORAL at 07:40

## 2023-04-11 RX ADMIN — RIFAXIMIN 550 MG: 550 TABLET ORAL at 07:41

## 2023-04-11 RX ADMIN — SEVELAMER CARBONATE 800 MG: 800 TABLET, FILM COATED ORAL at 09:03

## 2023-04-11 RX ADMIN — MELATONIN 5 MG TABLET 10 MG: at 21:14

## 2023-04-11 RX ADMIN — SEVELAMER CARBONATE 800 MG: 800 TABLET, FILM COATED ORAL at 12:46

## 2023-04-11 RX ADMIN — LACOSAMIDE 100 MG: 100 TABLET, FILM COATED ORAL at 12:33

## 2023-04-11 RX ADMIN — MIDODRINE HYDROCHLORIDE 10 MG: 5 TABLET ORAL at 17:47

## 2023-04-11 RX ADMIN — LACOSAMIDE 100 MG: 100 TABLET, FILM COATED ORAL at 23:24

## 2023-04-11 RX ADMIN — ACETAMINOPHEN 650 MG: 325 TABLET, FILM COATED ORAL at 07:56

## 2023-04-11 RX ADMIN — TACROLIMUS 1 MG: 1 CAPSULE ORAL at 07:40

## 2023-04-11 RX ADMIN — ACETAMINOPHEN 650 MG: 325 TABLET, FILM COATED ORAL at 21:14

## 2023-04-11 RX ADMIN — TACROLIMUS 1 MG: 1 CAPSULE ORAL at 21:12

## 2023-04-11 RX ADMIN — APIXABAN 5 MG: 5 TABLET, FILM COATED ORAL at 07:40

## 2023-04-11 RX ADMIN — MIDODRINE HYDROCHLORIDE 10 MG: 5 TABLET ORAL at 07:40

## 2023-04-11 RX ADMIN — MIDODRINE HYDROCHLORIDE 10 MG: 5 TABLET ORAL at 12:33

## 2023-04-11 RX ADMIN — LEVETIRACETAM 1000 MG: 500 TABLET, FILM COATED ORAL at 21:12

## 2023-04-11 RX ADMIN — GUAIFENESIN 600 MG: 600 TABLET, EXTENDED RELEASE ORAL at 21:12

## 2023-04-11 RX ADMIN — OLANZAPINE 5 MG: 5 TABLET, ORALLY DISINTEGRATING ORAL at 21:12

## 2023-04-11 RX ADMIN — Medication 1 CAPSULE: at 09:03

## 2023-04-11 RX ADMIN — RIFAXIMIN 550 MG: 550 TABLET ORAL at 21:12

## 2023-04-11 RX ADMIN — APIXABAN 5 MG: 5 TABLET, FILM COATED ORAL at 21:12

## 2023-04-11 RX ADMIN — ROPINIROLE HYDROCHLORIDE 0.5 MG: 0.25 TABLET, FILM COATED ORAL at 21:13

## 2023-04-11 RX ADMIN — RAMELTEON 8 MG: 8 TABLET, FILM COATED ORAL at 21:13

## 2023-04-11 RX ADMIN — GUAIFENESIN 600 MG: 600 TABLET, EXTENDED RELEASE ORAL at 09:03

## 2023-04-11 RX ADMIN — FOLIC ACID 1 MG: 1 TABLET ORAL at 07:40

## 2023-04-11 RX ADMIN — SEVELAMER CARBONATE 800 MG: 800 TABLET, FILM COATED ORAL at 17:47

## 2023-04-11 ASSESSMENT — ACTIVITIES OF DAILY LIVING (ADL)
ADLS_ACUITY_SCORE: 49
ADLS_ACUITY_SCORE: 49
ADLS_ACUITY_SCORE: 52
ADLS_ACUITY_SCORE: 49
ADLS_ACUITY_SCORE: 52
ADLS_ACUITY_SCORE: 52
ADLS_ACUITY_SCORE: 49
ADLS_ACUITY_SCORE: 52
ADLS_ACUITY_SCORE: 52
ADLS_ACUITY_SCORE: 49
ADLS_ACUITY_SCORE: 53
ADLS_ACUITY_SCORE: 49

## 2023-04-11 NOTE — PLAN OF CARE
Goal Outcome Evaluation:      DATE & TIME: 4/11/23 3279-5413  SUMMARY: Admitted 1/21 as a transfer from [facility] with GAVIN pleural effusions & hepatic encephalopathy.   HX: Liver transplant 2016 due to alcohol abuse, ESRD on HD, AFIB on chronic anticoagulation, seizure, DM II, CVA, CHRISTIAN on BiPAP.  NEURO/ORIENTATION: Disoriented to time; forgetful; confused  BEHAVIOR & AGGRESSION TOOL COLOR: Green; still impulsive        ABNL VS/O2: VSS on RA  MOBILITY: A1 GBW; up frequently today to chair, bathroom, and ambulated tipton  PAIN MANAGMENT: tylenol given for back pain this morning with little reported relief; declined pain patches available.  DIET: Regular diet, mildly thickened liquids (level 2); takes pills without difficulty; on calorie counts; good intake today, eating 100% today and supplements..  BOWEL/BLADDER: dialysis M-W-F; anuric. Had 6 BM this shift - small and medium  ABNL LAB/BG: Na 133, BUN 51.1 Cr 4.42  DRAIN/DEVICES: R chest CVC, double lumen for hemodialysis; PEG tube clamped; L arm fistula placed 3/21/23 +Bruit/Thrill  SKIN: Scattered bruising, scabbing, dry/scaly skin, L fistula site mostly healed  PROCEDURES: NA  OTHER: Plan to keep G-tube for if needed. Bolus 80ml/hr x3 hours TF ordered if pt consumes <50% of meals.  D/C DATE: Discharge pending TCU or ARU placement.

## 2023-04-11 NOTE — PLAN OF CARE
Goal Outcome Evaluation:       DATE & TIME: 4/11/23   SUMMARY: Admitted 1/21 as a transfer from [facility] with GAVIN pleural effusions & hepatic encephalopathy.   HX: Liver transplant 2016 due to alcohol abuse, ESRD on HD, AFIB on chronic anticoagulation, seizure, DM II, CVA, CHRISTIAN on BiPAP.  NEURO/ORIENTATION: Disoriented to time; forgetful; confused  BEHAVIOR & AGGRESSION TOOL COLOR: Green; still impulsive        ABNL VS/O2: VSS on RA  MOBILITY: A1 GBW; up frequently today to chair, bathroom, and ambulated tipton  PAIN MANAGMENT: Denies pain when asked, refusing ICY hot patches, wife massaging lower back.  DIET: Regular diet, mildly thickened liquids (level 2); takes pills without difficulty;  good intake today,ate 75% tor evening meal .  BOWEL/BLADDER: dialysis M-W-F; anuric. Had 2 BMs this shift , 6 on previous shift so Lactulose held per directions.   ABNL LAB/BG: Na 133, BUN 51.1 Cr 4.42  DRAIN/DEVICES: R chest CVC, double lumen for hemodialysis; PEG tube clamped; L arm fistula placed 3/21/23 +Bruit/Thrill  SKIN: Scattered bruising, scabbing, dry/scaly skin, L fistula site mostly healed  PROCEDURES: NA  OTHER: Plan to keep G-tube for if needed. Bolus 80ml/hr x3 hours TF ordered if pt consumes <50% of meals.  D/C DATE: Discharge pending TCU placement.

## 2023-04-11 NOTE — PROGRESS NOTES
CLINICAL NUTRITION SERVICES - REASSESSMENT NOTE    Recommendations Ordered by Registered Dietitian (RD):   - continue diet/supplements/room service assistance as ordered (noted request for breakfast as early as possible on dialysis days)  - Family may bring in meals/snacks as desired, small/frequent meals enouraged.   - TF formula available for back-up boluses.     Novasource Renal at 80 mL/hr x 3 hours PRN back up bolus if consumes <50%.   Per Bolus Provisions: 240 mL formula = 480 kcal, 22 g protein, 44 g CHO, 0 g fiber and 172 mL free water  Free Water Flush: 60 mL before and after each feeding     Future/Additional Recommendations:     Calorie count data:   4/8: 1019 kcal, 34 g protein  4/9: 1443 kcal, 71 g protein  4/10: 2474 kcal, 132 g protein   AVERAGE = 1645 kcal, 79 g protein (met 87% estimated energy and protein needs)     While pt did meet close to 90% of his needs on average, data also shows drastic variations in PO adequacy. Agree with keeping GT in place for potential decompensation.      Malnutrition:   (3/16)  % Weight Loss:  > 5% in 1 month (severe malnutrition)   % Intake:  No longer applies   Subcutaneous Fat Loss:  Orbital region moderate depletion, Upper arm region moderate-severe depletion and Thoracic region moderate-severe depletion  Muscle Loss:  Temporal region moderate depletion, Clavicle bone region severe depletion, Acromion bone region severe depletion, Patellar region moderate depletion and Anterior thigh region moderate-severe depletion  Fluid Retention:  Trace     Malnutrition Diagnosis: Severe malnutrition  In Context of:  Acute on Chronic illness or disease     EVALUATION OF PROGRESS TOWARD GOALS   Diet: Regular + Mildly thick liquids   M, W, F needs early breakfast    Nutrition Support:   Back up bolus only (Has not been utilized). Novasource Renal at 80 mL/hr x 3 hours PRN back up bolus if consumes <50%.   Per Bolus Provisions: 240 mL formula = 480 kcal, 22 g protein, 44 g CHO,  0 g fiber and 172 mL free water  Free Water Flush: 60 mL before and after each feeding    Intake/Tolerance:     Calorie count data:   : 1019 kcal, 34 g protein  : 1443 kcal, 71 g protein  4/10: 2474 kcal, 132 g protein   AVERAGE = 1645 kcal, 79 g protein (met 87% estimated energy and protein needs)   While pt did meet close to 90% of his needs on average, data also shows drastic variations in PO adequacy. Agree with keeping GT in place for potential decompensation.     ASSESSED NUTRITION NEEDS:  Dosing Weight (3/29) 76.1 kg   Estimated Energy Needs: 9696-7499+ kcals (25-30+ Kcal/Kg)  Justification: dialysis  Estimated Protein Needs: + grams protein (1.2-1.5+ g pro/Kg)  Justification: dialysis    NEW FINDINGS:   - Labs: Na 133 (L)  - Weight trends: Lowest wt on file measured 3/29 (79.1 kg). Fluctuations in fluid status in setting of dialysis impacting ability to fully assess dry wt.   Date/Time Weight Weight Method   04/10/23 1356 81.4 kg (179 lb 8 oz) Standing scale   04/10/23 0746 82.2 kg (181 lb 4.8 oz) Standing scale   23 0555 79.8 kg (175 lb 14.8 oz) Bed scale   23 0159 82.1 kg (181 lb) Standing scale   23 0723 76.1 kg (167 lb 12.3 oz) --   23 0743 77.4 kg (170 lb 10.2 oz) Bed scale     - Stoolin/10: BM x5  : BM x7  : BM x4    - Meds:   Folic Acid 1 mg   Lactulose BID - titrate to 2-3 soft BMs (to be held for loose stools)  Nephrocaps  Renvela w/ meals     Previous Goals:   Intake of >/=75% meals + supplements on average.  Evaluation: Met on average, snapshot just the past 3 days so may be more or less over greater time period.     Previous Nutrition Diagnosis:   Predicted inadequate nutrient intake (energy/protein) related to fluctuating intakes, lately pt consuming % of adequate trays.    Evaluation: No change - updated to more chronic nutrition issue    CURRENT NUTRITION DIAGNOSIS  Malnutrition related to prolonged hospitalization in setting of chronic  illness and poor prognosis per MD as evidenced by variable intakes     INTERVENTIONS  Recommendations / Nutrition Prescription  - Continue supplements, Room service assistance     - Pt should still be getting a back up TF bolus if he eats <50% of meals. Flush 60 mL before and after bolus.     Implementation  No changes today     Goals  Intake of >/=75% adequate meals on average.     MONITORING AND EVALUATION:  Progress towards goals will be monitored and evaluated per protocol and Practice Guidelines    Edna Osuna RD, LD  [facility], [facility], [facility]  Pager: [phone]  Weekend Pager: [phone]

## 2023-04-11 NOTE — PLAN OF CARE
Goal Outcome Evaluation:    DATE & TIME: 4/10 4539-6424    SUMMARY: Admitted 1/21 as a transfer from [facility] with GAVIN pleural effusions & hepatic encephalopathy.   HX: Liver transplant 2016 due to alcohol abuse, ESRD on HD, AFIB on chronic anticoagulation, seizure, DM II, CVA, CHRISTAIN on BiPAP.  NEURO/ORIENTATION: Disoriented to time; forgetful; confused  BEHAVIOR & AGGRESSION TOOL COLOR: Green; impulsive        ABNL VS/O2: VSS on RA  MOBILITY: A1 GBW  PAIN MANAGMENT: denies pain this shift  DIET: Regular diet, mildly thickened liquids (level 2); takes pills without difficulty; on calorie counts; good intake.  BOWEL/BLADDER: dialysis M-W-F; anuric.  ABNL LAB/BG: labs 4/10 Na 132, BUN 72.9 Cr 6.67, creatinine 6.67  DRAIN/DEVICES: R chest CVC, double lumen for hemodialysis; PEG tube clamped; L fistula placed 3/21/23 +Bruit/Thrill  SKIN: Scattered bruising, scabbing, dry/scaly skin, L fistula site healing  PROCEDURES: NA  OTHER: Plan to keep G-tube for if needed. Bolus 80ml/hr x3 hours TF ordered if pt consumes <50% of meals.  D/C DATE: Discharge pending TCU or ARU placement.

## 2023-04-11 NOTE — PROGRESS NOTES
Notes, labs, vitals reviewed.    Ca 10.0, P 4.4, Na 133    1.  ESKD.  HD tmrw.  Orders placed.  2.  Hypercalcemia.  Improved.

## 2023-04-11 NOTE — PLAN OF CARE
Goal Outcome Evaluation:  DATE & TIME: 4/10 2022-0889  SUMMARY: Admitted 1/21 as a transfer from [facility] with GAVIN pleural effusions & hepatic encephalopathy.   HX: Liver transplant 2016 due to alcohol abuse, ESRD on HD, AFIB on chronic anticoagulation, seizure, DM II, CVA, CHRISTIAN on BiPAP.  NEURO/ORIENTATION: Disoriented to time; forgetful; confused  BEHAVIOR & AGGRESSION TOOL COLOR: Green; impulsive        ABNL VS/O2: VSS on RA  MOBILITY: A1 GBW, up in chair for meal.  PAIN MANAGMENT: lower back pain. Heat packs applied.  DIET: Regular diet, mildly thickened liquids (level 2); takes pills without difficulty; on calorie counts; good intake.  BOWEL/BLADDER: dialysis M-W-F; anuric; 2 BMs this shift. Lactulose held.  ABNL LAB/BG:Na 132, BUN 72.9 Cr 6.67, creatinine 6.67  DRAIN/DEVICES: R chest CVC, double lumen for hemodialysis; PEG tube clamped; L fistula placed 3/21/23 +Bruit/Thrill  SKIN: Scattered bruising, scabbing, dry/scaly skin, L fistula site healing  PROCEDURES: Dialysis this morning, 2.5 L removed and tolerated well.  OTHER: Plan to keep G-tube for if needed. Bolus 80ml/hr x3 hours TF ordered if pt consumes <50% of meals.  D/C DATE: Discharge pending TCU or ARU placement.

## 2023-04-12 ENCOUNTER — APPOINTMENT (OUTPATIENT)
Dept: PHYSICAL THERAPY | Facility: CLINIC | Age: 58
DRG: 981 | End: 2023-04-12
Attending: STUDENT IN AN ORGANIZED HEALTH CARE EDUCATION/TRAINING PROGRAM
Payer: COMMERCIAL

## 2023-04-12 LAB
ALBUMIN SERPL BCG-MCNC: 3.3 G/DL (ref 3.5–5.2)
ANION GAP SERPL CALCULATED.3IONS-SCNC: 15 MMOL/L (ref 7–15)
BUN SERPL-MCNC: 73.4 MG/DL (ref 6–20)
CALCIUM SERPL-MCNC: 10.3 MG/DL (ref 8.6–10)
CHLORIDE SERPL-SCNC: 93 MMOL/L (ref 98–107)
CREAT SERPL-MCNC: 5.42 MG/DL (ref 0.67–1.17)
DEPRECATED HCO3 PLAS-SCNC: 24 MMOL/L (ref 22–29)
ERYTHROCYTE [DISTWIDTH] IN BLOOD BY AUTOMATED COUNT: 15.7 % (ref 10–15)
GFR SERPL CREATININE-BSD FRML MDRD: 11 ML/MIN/1.73M2
GLUCOSE SERPL-MCNC: 103 MG/DL (ref 70–99)
HCT VFR BLD AUTO: 28.7 % (ref 40–53)
HGB BLD-MCNC: 8.7 G/DL (ref 13.3–17.7)
MCH RBC QN AUTO: 29.4 PG (ref 26.5–33)
MCHC RBC AUTO-ENTMCNC: 30.3 G/DL (ref 31.5–36.5)
MCV RBC AUTO: 97 FL (ref 78–100)
PHOSPHATE SERPL-MCNC: 5.8 MG/DL (ref 2.5–4.5)
PLATELET # BLD AUTO: 95 10E3/UL (ref 150–450)
POTASSIUM SERPL-SCNC: 4.6 MMOL/L (ref 3.4–5.3)
RBC # BLD AUTO: 2.96 10E6/UL (ref 4.4–5.9)
SODIUM SERPL-SCNC: 132 MMOL/L (ref 136–145)
WBC # BLD AUTO: 2.9 10E3/UL (ref 4–11)

## 2023-04-12 PROCEDURE — 36415 COLL VENOUS BLD VENIPUNCTURE: CPT | Performed by: STUDENT IN AN ORGANIZED HEALTH CARE EDUCATION/TRAINING PROGRAM

## 2023-04-12 PROCEDURE — 250N000013 HC RX MED GY IP 250 OP 250 PS 637: Performed by: PSYCHIATRY & NEUROLOGY

## 2023-04-12 PROCEDURE — 250N000013 HC RX MED GY IP 250 OP 250 PS 637: Performed by: STUDENT IN AN ORGANIZED HEALTH CARE EDUCATION/TRAINING PROGRAM

## 2023-04-12 PROCEDURE — 120N000001 HC R&B MED SURG/OB

## 2023-04-12 PROCEDURE — 634N000001 HC RX 634: Performed by: INTERNAL MEDICINE

## 2023-04-12 PROCEDURE — 99232 SBSQ HOSP IP/OBS MODERATE 35: CPT | Performed by: INTERNAL MEDICINE

## 2023-04-12 PROCEDURE — 97110 THERAPEUTIC EXERCISES: CPT | Mod: GP

## 2023-04-12 PROCEDURE — 258N000003 HC RX IP 258 OP 636: Performed by: INTERNAL MEDICINE

## 2023-04-12 PROCEDURE — 250N000013 HC RX MED GY IP 250 OP 250 PS 637: Performed by: HOSPITALIST

## 2023-04-12 PROCEDURE — 90935 HEMODIALYSIS ONE EVALUATION: CPT | Performed by: INTERNAL MEDICINE

## 2023-04-12 PROCEDURE — 90937 HEMODIALYSIS REPEATED EVAL: CPT

## 2023-04-12 PROCEDURE — 250N000011 HC RX IP 250 OP 636: Performed by: INTERNAL MEDICINE

## 2023-04-12 PROCEDURE — 80069 RENAL FUNCTION PANEL: CPT | Performed by: STUDENT IN AN ORGANIZED HEALTH CARE EDUCATION/TRAINING PROGRAM

## 2023-04-12 PROCEDURE — 85027 COMPLETE CBC AUTOMATED: CPT | Performed by: INTERNAL MEDICINE

## 2023-04-12 PROCEDURE — 97116 GAIT TRAINING THERAPY: CPT | Mod: GP

## 2023-04-12 PROCEDURE — 250N000012 HC RX MED GY IP 250 OP 636 PS 637: Performed by: STUDENT IN AN ORGANIZED HEALTH CARE EDUCATION/TRAINING PROGRAM

## 2023-04-12 RX ORDER — ALBUMIN (HUMAN) 12.5 G/50ML
50 SOLUTION INTRAVENOUS
Status: DISCONTINUED | OUTPATIENT
Start: 2023-04-12 | End: 2023-04-12

## 2023-04-12 RX ADMIN — EPOETIN ALFA-EPBX 10000 UNITS: 10000 INJECTION, SOLUTION INTRAVENOUS; SUBCUTANEOUS at 09:03

## 2023-04-12 RX ADMIN — ACETAMINOPHEN 650 MG: 325 TABLET, FILM COATED ORAL at 05:22

## 2023-04-12 RX ADMIN — FOLIC ACID 1 MG: 1 TABLET ORAL at 11:39

## 2023-04-12 RX ADMIN — MIDODRINE HYDROCHLORIDE 10 MG: 5 TABLET ORAL at 11:37

## 2023-04-12 RX ADMIN — SODIUM CHLORIDE 300 ML: 9 INJECTION, SOLUTION INTRAVENOUS at 09:00

## 2023-04-12 RX ADMIN — LACOSAMIDE 100 MG: 100 TABLET, FILM COATED ORAL at 11:37

## 2023-04-12 RX ADMIN — GUAIFENESIN 600 MG: 600 TABLET, EXTENDED RELEASE ORAL at 07:38

## 2023-04-12 RX ADMIN — OLANZAPINE 5 MG: 5 TABLET, ORALLY DISINTEGRATING ORAL at 11:40

## 2023-04-12 RX ADMIN — LACTULOSE 10 G: 10 SOLUTION ORAL at 11:39

## 2023-04-12 RX ADMIN — HEPARIN SODIUM 1900 UNITS: 1000 INJECTION INTRAVENOUS; SUBCUTANEOUS at 09:04

## 2023-04-12 RX ADMIN — SEVELAMER CARBONATE 800 MG: 800 TABLET, FILM COATED ORAL at 11:51

## 2023-04-12 RX ADMIN — TACROLIMUS 1 MG: 1 CAPSULE ORAL at 07:37

## 2023-04-12 RX ADMIN — PANTOPRAZOLE SODIUM 40 MG: 40 TABLET, DELAYED RELEASE ORAL at 07:38

## 2023-04-12 RX ADMIN — LEVETIRACETAM 1000 MG: 500 TABLET, FILM COATED ORAL at 21:13

## 2023-04-12 RX ADMIN — MIDODRINE HYDROCHLORIDE 10 MG: 5 TABLET ORAL at 07:38

## 2023-04-12 RX ADMIN — SODIUM CHLORIDE 250 ML: 9 INJECTION, SOLUTION INTRAVENOUS at 09:00

## 2023-04-12 RX ADMIN — RIFAXIMIN 550 MG: 550 TABLET ORAL at 21:14

## 2023-04-12 RX ADMIN — SEVELAMER CARBONATE 800 MG: 800 TABLET, FILM COATED ORAL at 07:38

## 2023-04-12 RX ADMIN — MIDODRINE HYDROCHLORIDE 10 MG: 5 TABLET ORAL at 17:20

## 2023-04-12 RX ADMIN — OLANZAPINE 5 MG: 5 TABLET, ORALLY DISINTEGRATING ORAL at 21:14

## 2023-04-12 RX ADMIN — RIFAXIMIN 550 MG: 550 TABLET ORAL at 07:37

## 2023-04-12 RX ADMIN — LACOSAMIDE 100 MG: 100 TABLET, FILM COATED ORAL at 23:53

## 2023-04-12 RX ADMIN — APIXABAN 5 MG: 5 TABLET, FILM COATED ORAL at 21:14

## 2023-04-12 RX ADMIN — Medication 1 CAPSULE: at 11:39

## 2023-04-12 RX ADMIN — TACROLIMUS 1 MG: 1 CAPSULE ORAL at 21:14

## 2023-04-12 RX ADMIN — HEPARIN SODIUM 1900 UNITS: 1000 INJECTION INTRAVENOUS; SUBCUTANEOUS at 09:05

## 2023-04-12 RX ADMIN — ROPINIROLE HYDROCHLORIDE 0.5 MG: 0.25 TABLET, FILM COATED ORAL at 21:14

## 2023-04-12 RX ADMIN — GUAIFENESIN 600 MG: 600 TABLET, EXTENDED RELEASE ORAL at 21:13

## 2023-04-12 RX ADMIN — APIXABAN 5 MG: 5 TABLET, FILM COATED ORAL at 07:37

## 2023-04-12 RX ADMIN — RAMELTEON 8 MG: 8 TABLET, FILM COATED ORAL at 21:14

## 2023-04-12 RX ADMIN — SEVELAMER CARBONATE 800 MG: 800 TABLET, FILM COATED ORAL at 17:20

## 2023-04-12 ASSESSMENT — ACTIVITIES OF DAILY LIVING (ADL)
ADLS_ACUITY_SCORE: 52
ADLS_ACUITY_SCORE: 53
ADLS_ACUITY_SCORE: 52
ADLS_ACUITY_SCORE: 53
ADLS_ACUITY_SCORE: 52
ADLS_ACUITY_SCORE: 53

## 2023-04-12 NOTE — PROGRESS NOTES
Potassium   Date Value Ref Range Status   04/12/2023 4.6 3.4 - 5.3 mmol/L Final   12/29/2022 3.4 3.4 - 5.3 mmol/L Final   04/20/2020 3.9 3.4 - 5.3 mmol/L Final     Potassium POCT   Date Value Ref Range Status   01/19/2023 3.6 3.5 - 5.0 mmol/L Final     Hemoglobin   Date Value Ref Range Status   04/12/2023 8.7 (L) 13.3 - 17.7 g/dL Final   04/20/2020 14.3 13.3 - 17.7 g/dL Final     Creatinine   Date Value Ref Range Status   04/12/2023 5.42 (H) 0.67 - 1.17 mg/dL Final   04/20/2020 1.06 0.66 - 1.25 mg/dL Final     Urea Nitrogen   Date Value Ref Range Status   04/12/2023 73.4 (H) 6.0 - 20.0 mg/dL Final   12/29/2022 46 (H) 7 - 30 mg/dL Final   04/20/2020 23 7 - 30 mg/dL Final     Sodium   Date Value Ref Range Status   04/12/2023 132 (L) 136 - 145 mmol/L Final   04/20/2020 139 133 - 144 mmol/L Final     INR   Date Value Ref Range Status   12/21/2022 1.36 (H) 0.85 - 1.15 Final   10/07/2019 1.20 (H) 0.86 - 1.14 Final       DIALYSIS PROCEDURE NOTE  Hepatitis status of previous patient on machine log was checked and verified ok to use with this patients hepatitis status.  Patient dialyzed for 3 hrs. on a K3 bath with a net fluid removal of  2.5L.  A BFR of 300-350 ml/min was obtained via a CVC.      The treatment plan was discussed with Dr. Reddy during the treatment.    Total heparin received during the treatment: 0 units.   Line flushed, clamped and capped with heparin 1:1000 1.9 mL (1900 units) per lumen    Meds  given: epogen   Complications: none      Person educated: pre-tx. Knowledge base minimal. Barriers to learning: confusion. Educated on procedure via verbal mode. Patient verbalized understanding.   ICEBOAT? Timeout performed pre-treatment  I: Patient was identified using 2 identifiers  C:  Consent Signed Yes  E: Equipment preventative maintenance is current and dialysis delivery system OK to use  B: Hepatitis B Surface Antigen: negative; Draw Date: 3/29/23      Hepatitis B Surface Antibody: susceptible; Draw Date:  3/29/23  O: Dialysis orders present and complete prior to treatment  A: Vascular access verified and assessed prior to treatment  T: Treatment was performed at a clinically appropriate time  ?: Patient was allowed to ask questions and address concerns prior to treatment  See Adult Hemodialysis flowsheet in EPIC for further details and post assessment.  Machine water alarm in place and functioning. Transducer pods intact and checked every 15min.   Pt returned via bed.  Chlorine/Chloramine water system checked every 4 hours.  Outpatient Dialysis at *not established    Patient repositioned every 2 hours during the treatment.  Post treatment report given to Melissa Jang RN regarding 2.5L of fluid removed, last BP of 96/60, and patient pain rating of 0/10.

## 2023-04-12 NOTE — PLAN OF CARE
Goal Outcome Evaluation:     DATE & TIME: 4/12/23 9915-7761  Cognitive Concerns/ Orientation : Disoriented to time and sometimes situation. Forgetful. Can be impulsive   BEHAVIOR & AGGRESSION TOOL COLOR: Green    ABNL VS/O2: VSS on RA  MOBILITY: A x1 GB/W  PAIN MANAGMENT: denied pain this shift;     DIET: Regular diet, mildly thickened liquids (level 2); takes pills without difficulty; appetite good; taking supplements  BOWEL/BLADDER: On HD M-W-F, anuric. Lactulose as scheduled , BM x 1 this shift  ABNL LAB/BG: Na 132, Hgb 8.7, Platelet 95  DRAIN/DEVICES: R chest HD CVC, PEG tube clamped; L arm fistula with good bruit/thrill  SKIN: Scattered bruising, scabbing, dry/scaly skin, L fistula site scabbed  TESTS/PROCEDURES: HD MWF  D/C DATE: Discharge pending TCU placement.   OTHER IMPORTANT INFO: Plan to keep G-tube for possible future needs.

## 2023-04-12 NOTE — PROGRESS NOTES
[facility]     Renal Progress Note       SHORTHAND KEY FOR MY NOTES:  c = with, s = without, p = after, a = before, x = except, asx = asymptomatic, tx = transplant or treatment, sx = symptoms or symptomatic, cx = canceled or culture, rxn = reaction, yday = yesterday, nl = normal, abx = antibiotics, fxn = function, dx = diagnosis, dz = disease, m/h = melena/hematochezia, c/d/l/ha = cramping/dizziness/lightheadedness/headache, d/c = discharge or diarrhea/constipation, f/c/n/v = fevers/chills/nausea/vomiting, cp/sob = chest pain/shortness of breath, tbv = total body volume, rxn = reaction, tdc = tunneled dialysis catheter, pta = prior to admission, hd = hemodialysis, pd = peritoneal dialysis, hhd = home hemodialysis, edw = estimated dry wt         Assessment/Plan:     1.  ESKD.  Pt dialysing per a MWF schedule.  He got a bit too dry today.  A.  Next HD on Fri.    2.  Liver tx.  Pt is on tac.    A.  Continue tac and check levels periodically.    3.  Hypotension.  He is on chronic midodrine.  BP is ok and he is generally asx for BP > 85.   A.  Midodrine 10 mg tid scheduled. Ok to use an additional dose during HD prn.  B.  Follow BPs.  C.  If symptomatic c low BP, then give 250 ml NS.      4.  FEN.  Ca remains a bit high.  Immobilization is the most likely etiology.  A.  Follow electrolytes.        Interval History:     Pt is doing ok x some mild d/l during the run.  His BP dropped a bit so UF goal was reduced.  He feels fine now.          Medications and Allergies:       - MEDICATION INSTRUCTIONS for Dialysis Patients -   Does not apply See Admin Instructions     apixaban ANTICOAGULANT  5 mg Oral BID     folic acid  1 mg Oral or Feeding Tube Daily     guaiFENesin  600 mg Oral BID     lacosamide  100 mg Oral Q12H     lactulose  10 g Oral BID     levETIRAcetam  1,000 mg Oral Q24H     lidocaine   Transdermal Q8H BIJU     menthol   Transdermal Q8H     midodrine  10 mg Oral or Feeding Tube TID w/meals      multivitamin RENAL  1 capsule Oral Daily     OLANZapine zydis  5 mg Oral At Bedtime     OLANZapine zydis  5 mg Oral Once per day on Mon Wed Fri     pantoprazole  40 mg Oral QAM AC     ramelteon  8 mg Oral QPM     rifaximin  550 mg Oral or Feeding Tube BID     rOPINIRole  0.5 mg Oral At Bedtime     sevelamer carbonate  800 mg Oral TID w/meals     sodium chloride (PF)  3 mL Intracatheter Q8H     sodium chloride (PF)  9 mL Intracatheter During Dialysis/CRRT (from stock)     sodium chloride (PF)  9 mL Intracatheter During Dialysis/CRRT (from stock)     tacrolimus  1 mg Oral Q12H     No Known Allergies       Physical Exam:     Vitals were reviewed     , Blood pressure 92/54, pulse 70, temperature 97.8  F (36.6  C), temperature source Oral, resp. rate 18, weight 80.8 kg (178 lb 2.1 oz), SpO2 96 %.  Wt Readings from Last 3 Encounters:   04/12/23 80.8 kg (178 lb 2.1 oz)   01/21/23 82.4 kg (181 lb 11.2 oz)   12/28/22 96 kg (211 lb 10.3 oz)     Intake/Output Summary (Last 24 hours) at 4/12/2023 1159  Last data filed at 4/11/2023 1800  Gross per 24 hour   Intake 480 ml   Output --   Net 480 ml     GENERAL APPEARANCE: pleasant, lying in bed, NAD  RESP: CTA B c good efforts  CV: RRR, nl S1/S2   ABDOMEN: s/nt/nd   EXTREMITIES/SKIN: no significant ble edema  ACCESS:  RIJ TDC - site ok    Pt seen on HD. Stable run expected despite low BPs. -2.5L UF; goal was reduced a bit.  Good BFR via RIJ TDC.         Data:     CBC RESULTS:     Recent Labs   Lab 04/12/23  0755 04/08/23  0859   WBC 2.9* 3.4*   RBC 2.96* 3.00*   HGB 8.7* 9.0*   HCT 28.7* 30.3*   PLT 95* 95*     Basic Metabolic Panel:  Recent Labs   Lab 04/12/23  0755 04/11/23  0650 04/10/23  0825 04/09/23  1043 04/08/23  0859 04/07/23 1959   * 133* 132* 132* 137 139   POTASSIUM 4.6 4.3 4.6 4.1 3.8 3.6   CHLORIDE 93* 95* 94* 94* 99 97*   CO2 24 26 23 27 26 28   BUN 73.4* 51.1* 72.9* 56.4* 40.9* 36.2*   CR 5.42* 4.42* 6.67* 5.84* 4.46* 3.59*   * 86 132* 107* 100*  119*   KELLY 10.3* 10.0 10.2* 10.4* 9.9 9.5     INRNo lab results found in last 7 days.   Attestation:   I have reviewed today's relevant vital signs, notes, medications, labs and imaging.    Thierry Reddy MD  [facility] - Nephrology  [phone]

## 2023-04-12 NOTE — PROGRESS NOTES
[facility]    Medicine Progress Note - Hospitalist Service    Date of Admission:  1/21/2023    Assessment & Plan   Blanco Osborne is a 57 year-old male admitted on 1/21/2023 as a transfer from [facility] for evaluation of loculated pleural effusion. He has extensive PMH including h/o liver transplant 2016 due to alcohol abuse, pAF on chronic anticoagulation, history of diabetes mellitus type 2, CVA, hypertension, CHRISTIAN on BiPAP.  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 He was recovering in [facility] and was transferred to [facility] for consideration of chest tube placement and possible intrapleural lysis for worsening effusion.     Acute metabolic encephalopathy with delirium, multifactorial, slowly improving?  Hepatic encephalopathy  Chronic liver disease, history of liver transplant 2016  End-stage renal disease (ESRD), on hemodialysis (HD)  History of seizure, on Keppra and Vimpat  Waxing and waning mentation, coinciding with lactulose being held at [facility]  Earlier in hospital stay, remained confused and had pulled out tracheostomy tube, PICC line and pulled his dialysis catheter.  Lines replaced.  Psychiatry consulted, recent follow-up 2/21, 2/28, see notes.    Mental status continues to fluctuate with periods of alertness and increased sleepiness, overall improving.    Complicated, prolonged hospital course with mental status concerns multifactorial related to hepatic encephalopathy, end-stage renal disease on dialysis, past PEA arrest, seizure disorder, medication, and chronic liver disease with prior liver transplant.  As mental status fluctuates will continue to require one-to-one sitter as needed for added safety and supervision.  Reassess daily.    Continue to reorient and  redirect as able.  Avoid restraints if possible with goals of safety and close supervision (given multiple lines and tubes - dialysis catheter, PEG tube, IV, etc).    Maintain normal day/night cycles and sleep-wake cycles.  Minimize sedating medications as able.  Remains weak and deconditioned with complex, prolonged hospital course and limited mobility.  Encouragement and support offered daily to patient.    Continue Lactulose to 10 mg BID, monitor for symptoms; goal to have 2-3 bowel movements per day.    Ongoing hemodialysis, as per nephrology, 3X/week dialysis schedule.    Nephrology following    Continue ramelteon 8 mg at bedtime; monitor for side effects including AM sedation, reassess daily.    Started on olanzapine 5 mg BID and PRN earlier during this hospitalization. Delirium improved, discontinued AM dose of olanzapine on non-dialysis days.    Seizure disorder stable, continue Keppra and Vimpat; monitor, no recurrent seizures reported of recent.    Reconsult psychiatry as needed.    Neurology consult appreciated.  Patient has been started on ropinirole for restless leg syndrome.     Bilateral pleural effusions   Acute respiratory failure requiring tracheostomy  - resolved    Pneumonia  - resolved   ARDS  - resolved    Right pneumothorax - resolved   *Initial event was parainfluenza and strep pneumo pneumonia back in November 2022. Treated for ARDS. Had failed extubation x2 and tracheostomy performed on previous hospitalization. *Had additional complications of bilateral pneumothoraces as well as hydropneumothorax- pleural fluid grew candida parapsilosis  *Completed 4-week antifungal treatment. While in [facility] he was successfully weaned from the ventilator.  *Recently there were concern for worsening effusion while at LTACH and had thoracentesis 01/13 which returned exudative without growth on cultures. CT chest/abdomen/pelvis on 01/18 demonstrated worsening effusions and concerns for infected parapneumonic  effusions with possible SBP.  Multiple pulmonologist at [facility]ACH reviewed CT scan recommended transfer to [facility] for consideration of chest tube placement and possible intrapleural lysis  *Thoracic surgery (Dr. Jackson) consulted during admission   *CT chest 1/22, small effusions on imaging and so chest tube was not inserted.   ID consulted, see note 2/10.  *Patient pulled out his tracheostomy tube on the night 2/1-2/2 and is tolerating well without increased shortness of breath persistent cough or worsening hypoxia.  * Chest x-ray 2/3 with cardiomegaly, chronic small bilateral pleural effusions, no pulmonary edema; right internal jugular dialysis catheter in place.   3/12 Pulmonary consult, see note, concerns of hypercapnia, atelectasis, with consideration of BiPAP trial; no recommendations for antibiotics with infiltrates felt to be atelectasis.    Stable, continue to monitor respiratory status, recently stable on room air; occasional cough reported.    Encourage incentive spirometry as able, up to chair, deep breathing.    Wound care previously consulted for tracheostomy site care, monitor for signs and symptoms of infection, healing well - resolved.    Encourage ambulation.     Possible splenic infract  Wedge shaped area on CT scan chest and CT abdomen on 3/7/23.    Hematology consulted, suspicion for infarct was low.    TTE no thrombus or vegetations.    No abdominal pain CTM.     S/p liver transplant 2016   Chronic immunosuppression, on tacrolimus  Cirrhosis with ascites  Subacute bacterial peritonitis (SBP), resolved  *Main manifestations of this are hepatic encephalopathy and ascites.  Hepatologist at [place] felt that most likely reason for the cirrhosis was metabolic syndrome or alcohol intake.  There was no evidence of rejection on biopsy and there was some evidence of regeneration. Paracentesis done on 12/22/2022 showed lactobacillus indicating SBP, treated with Unasyn and Augmentin, finished 2-week  "course 1/12/2023.  Requires large-volume paracentesis periodically for recurring ascites.    *Paracentesis 1/13/2023 yielded about 7500 cc. Cultures NGTD. paracentesis on 1/24 with 4.8 L fluid removal  Paracentesis on 3/6/23 No SBP    Continue intermittent paracentesis as needed if develops symptomatic ascites.    Monitor for signs and symptoms of recurrent spontaneous bacterial peritonitis.    Further treatment for recurrent ascites with TIPS deferred to his Liver Transplant team and/or Hepatology, he has an appointment on 4/21/2023 with Hepatology, Dr. Simms.    Continue Tacrolimus 1 mg BID, rifaximin 550 mg BID.    Continue lactulose as ordered, titrate as needed to have 2-3 bms.    GI consult as needed in hospital for new acute issues, otherwise as outpatient.    Encourgae high protein diet.    CT abdomen on March 9, 2023 showed small to moderate ascites.     Goals of care   As per prior rounding provider, \"on 1/25 nursing had mentioned that patient had refused to undergo dialysis and want to stop care, palliative care was consulted.  Patient and his spouse denied wanting to stop care and wanted ongoing restorative care including full code\"    Monitor, Full Code status.    Needs placement to TCU -> SW working on placement     Diarrhea, improved, on lactulose for chronic liver disease    C difficile negative on 1/31. Secondary to lactulose. Discontinued rectal tube (2/12) as stools more formed.    Continue lactulose with goal of 2 to 3 soft bowel movement in 24 hours.     Paroxysmal atrial fibrillation  Chronic anticoagulation, apixaban  History of embolic strokes  Remains in sinus rhythm, on anticoagulation with apixaban indefinitely for stroke prophylaxis.      Monitor rhythm.    Continue apixaban anticoagulation, it was briefly held for fistula placement.    Monitor hemoglobin, see below.     Acute anemia  Patient has required intermittent transfusions for on-going anemia.  Anemia most likely secondary to " critical illness/chronic disease, chronic renal disease.  Baseline hemoglobin around 7-8.  Likely Epo resistance.    Hemoglobin stable at 8.4 on 3/30/23.    Monitor intermittently.     Hypercalcemia  Likhi hypercalcemia of Immobility    Nephrology following.    Hold VIT D.    Continue to hold Phoslo.    Monitor labs.    Encourage ambulation.    Nephrology considering prolia, however patient's wife would like to hold off for now.     History PEA arrest   PEA arrest prior to his previous admission and 1 episode of PEA associated with desaturation on 12/20.  No structural cardiac disease.     Stable, continue cardiac Monitoring.     Chronic Hypotension  In the past has developed baseline hypotension with cirrhosis and prolonged critical illness.  On hemodialysis.  Receiving midodrine and albumin during dialysis.    Continue midodrine, as per nephrology.      History of seizure   After hypoxic event, in the context of baseline multifactorial encephalopathy secondary to his ongoing critical illness and prior cardiac arrests and prior strokes and cirrhosis with hepatic encephalopathy. MRI of head of 12/20/22 reveals no PRES or leptomeningeal enhancement but scattered.  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   No recent seizure activity.    Continue levetiracetam, lacosamide.    Seizure precautions.    Outpatient follow-up with neurology, consult inpatient if needed.     ESRD  Anemia due to renal disease  Hypotension during dialysis  Hyperphosphatemia    As per nephrology.    Nephrology reviewing vein mapping to assess options for internal access placement for dialysis, see note 3/16.    Underwent Fistula placement on  3/21/22.    Vascular surgery following-continue to use for tunnel catheter.  Outpatient follow-up with vascular surgery and for will need a follow-up ultrasound of the fistula in 6 weeks to assess patency.    Started sevalmer 800mg TID with meals on  3/27/23.    Complained of cramping with dialysis. Tried a dose of oxycodone prior to dialysis but this was not helpful. He will discuss the cramping with nephrology.     Diabetes mellitus type 2  *Hemoglobin A1c on November 2022 was 4.7  *By report, on Lantus insulin in the past.  Blood sugar checks and sliding scale insulin previously discontinued as has been stable without insulin needs.    Monitor with periodic blood sugar checks as needed.     Hypomagnesemia    Resolved with replacement.     Severe malnutrition, protein and calorie type  Moderate dysphagia  G-tube feedings    Continue with tube feeds via PEG tube.    IR replaced the PEG tube on 2/8/2023, monitor.    Nutritionist consulted and following for tube feeds.    SLP following as well. Oral diet as per speech and language therapy (SLP). Patient hopeful to advance to thin liquids soon.    2/20 Nutrition following for tube feeds.    Now undergoing PRN tube feeds.    Encourage high-protein diet.    Nutrition notes reviewed, patient eating 0 to 100% of meals.  Continue current interventions.  As needed tube feed boluses available if patient consumes less than 50% of the meal.    Family encouraging patient to eat high protein diet including protein bars    Family asking about g tube removal. Per the EMR, last tube feed bolus was 3/22 if adequately documented. He refuses his tube feeds often per the dietitician. At this point, would favor keeping it in place given overall poor prognosis with ESRD, cirrhosis of transplanted liver, persistent encephalopathy, and inadequate oral intake. Event if he refuses, he is at risk for developing new infection or other decompensation and thus g tube as safety net.      Anxiety  Fluoxetine was discontinued as per psychiatry recommendation on 2/2 (see consult note for details).    Stable, monitor; comfort and reassurance offered during visit.    Psychiatry follow-up.     Restless leg  Syndrome    Patient started on ropiranole  by neurology.       Diet: Snacks/Supplements Adult: Other; Gelatein+ with brkfst and lunch, and magic cup with dinner (RD); With Meals  Combination Diet Regular Diet; Mildly Thick (level 2) (OK for fresh fruit (e.g., pineapple) per SLP)  Adult Formula Bolus Feeding: Novasource Renal; Route: Gastrostomy; 3 times daily; Volume per Bolus: 240; mL(s); Back up bolus for when pt consumes <50% of meals: 80 mL/hr x 3 hrs  Room Service    DVT Prophylaxis: DOAC  Nixon Catheter: Not present  Lines: PRESENT      CVC Double Lumen Right External jugular Tunneled-Site Assessment: WDL  Hemodialysis Vascular Access Arteriovenous fistula Left Forearm-Site Assessment: WDL;Bruit present;Thrill present      Cardiac Monitoring: None  Code Status: Full Code      Clinically Significant Risk Factors           # Hypercalcemia: Highest Ca = 10.3 mg/dL in last 2 days, will monitor as appropriate    # Hypoalbuminemia: Lowest albumin = 1.9 g/dL at 1/23/2023  6:38 AM, will monitor as appropriate   # Thrombocytopenia: Lowest platelets = 95 in last 2 days, will monitor for bleeding          # Severe Malnutrition: based on nutrition assessment        Disposition Plan     Expected Discharge Date: 04/19/2023, 12:00 PM  Discharge Delays: Placement - LTC  Destination: inpatient rehabilitation facility  Discharge Comments: Dialysis pt MWF. Will need placement TCU/LTC. [facility] willing to accept once no sitter, SW to follow up when sitter free          Pako Tan MD  Hospitalist Service  [facility]  Securely message with gopogo (more info)  Text page via [employer] Paging/Directory   ______________________________________________________________________    Interval History     No new complaints    Physical Exam   /62 (BP Location: Right arm)   Pulse 70   Temp 98  F (36.7  C) (Oral)   Resp 16   Wt 80.8 kg (178 lb 2.1 oz)   SpO2 92%   BMI 24.16 kg/m    Gen- pleasant   Neck- supple  CVS- I+II+ no m/r/g  MARKIE LINO  Abdo-  soft, no tenderness . No g/r/r  Ext- no edema     Medical Decision Making       36 MINUTES SPENT BY ME on the date of service doing chart review, history, exam, documentation & further activities per the note.      Data   ------------------------- PAST 24 HR DATA REVIEWED -----------------------------------------------    I have personally reviewed the following data over the past 24 hrs:    2.9 (L)  \   8.7 (L)   / 95 (L)     132 (L) 93 (L) 73.4 (H) /  103 (H)   4.6 24 5.42 (H) \       ALT: N/A AST: N/A AP: N/A TBILI: N/A   ALB: 3.3 (L) TOT PROTEIN: N/A LIPASE: N/A

## 2023-04-12 NOTE — PLAN OF CARE
DATE & TIME: 4/11/23 1900-0730  Cognitive Concerns/ Orientation : Disoriented to time and sometimes situation. Forgetful. Can be impulsive   BEHAVIOR & AGGRESSION TOOL COLOR: Green    ABNL VS/O2: VSS on RA  MOBILITY: A x1 GB/W  PAIN MANAGMENT: PRN Tylenol given x2 for chronic lower back pain. Rest promoted.   DIET: Regular diet, mildly thickened liquids (level 2); takes pills without difficulty  BOWEL/BLADDER: On HD, anuric. Lactulose held, BM goal met    ABNL LAB/BG: Na 133, Cr 4.42  DRAIN/DEVICES: R chest HD CVC, PEG tube clamped; L arm fistula placed 3/21/23   SKIN: Scattered bruising, scabbing, dry/scaly skin, L fistula site mostly healed  TESTS/PROCEDURES: HD MWF  D/C DATE: Discharge pending TCU placement.   OTHER IMPORTANT INFO: Plan to keep G-tube for if needed.

## 2023-04-13 ENCOUNTER — APPOINTMENT (OUTPATIENT)
Dept: PHYSICAL THERAPY | Facility: CLINIC | Age: 58
DRG: 981 | End: 2023-04-13
Attending: STUDENT IN AN ORGANIZED HEALTH CARE EDUCATION/TRAINING PROGRAM
Payer: COMMERCIAL

## 2023-04-13 DIAGNOSIS — Z99.2 ESRD (END STAGE RENAL DISEASE) ON DIALYSIS (H): Primary | ICD-10-CM

## 2023-04-13 DIAGNOSIS — N18.6 ESRD (END STAGE RENAL DISEASE) ON DIALYSIS (H): Primary | ICD-10-CM

## 2023-04-13 DIAGNOSIS — T82.898A OTHER SPECIFIED COMPLICATION OF VASCULAR PROSTHETIC DEVICES, IMPLANTS AND GRAFTS, INITIAL ENCOUNTER (H): ICD-10-CM

## 2023-04-13 LAB
ALBUMIN SERPL BCG-MCNC: 3.6 G/DL (ref 3.5–5.2)
ANION GAP SERPL CALCULATED.3IONS-SCNC: 13 MMOL/L (ref 7–15)
BUN SERPL-MCNC: 54.9 MG/DL (ref 6–20)
CALCIUM SERPL-MCNC: 10.3 MG/DL (ref 8.6–10)
CHLORIDE SERPL-SCNC: 98 MMOL/L (ref 98–107)
CREAT SERPL-MCNC: 4.63 MG/DL (ref 0.67–1.17)
DEPRECATED HCO3 PLAS-SCNC: 27 MMOL/L (ref 22–29)
GFR SERPL CREATININE-BSD FRML MDRD: 14 ML/MIN/1.73M2
GLUCOSE SERPL-MCNC: 141 MG/DL (ref 70–99)
MAGNESIUM SERPL-MCNC: 1.8 MG/DL (ref 1.7–2.3)
PHOSPHATE SERPL-MCNC: 4.8 MG/DL (ref 2.5–4.5)
POTASSIUM SERPL-SCNC: 4.1 MMOL/L (ref 3.4–5.3)
SODIUM SERPL-SCNC: 138 MMOL/L (ref 136–145)

## 2023-04-13 PROCEDURE — 250N000012 HC RX MED GY IP 250 OP 636 PS 637: Performed by: STUDENT IN AN ORGANIZED HEALTH CARE EDUCATION/TRAINING PROGRAM

## 2023-04-13 PROCEDURE — 250N000013 HC RX MED GY IP 250 OP 250 PS 637: Performed by: STUDENT IN AN ORGANIZED HEALTH CARE EDUCATION/TRAINING PROGRAM

## 2023-04-13 PROCEDURE — 36415 COLL VENOUS BLD VENIPUNCTURE: CPT | Performed by: STUDENT IN AN ORGANIZED HEALTH CARE EDUCATION/TRAINING PROGRAM

## 2023-04-13 PROCEDURE — 97116 GAIT TRAINING THERAPY: CPT | Mod: GP

## 2023-04-13 PROCEDURE — 250N000013 HC RX MED GY IP 250 OP 250 PS 637: Performed by: HOSPITALIST

## 2023-04-13 PROCEDURE — 99232 SBSQ HOSP IP/OBS MODERATE 35: CPT | Performed by: INTERNAL MEDICINE

## 2023-04-13 PROCEDURE — 120N000001 HC R&B MED SURG/OB

## 2023-04-13 PROCEDURE — 83735 ASSAY OF MAGNESIUM: CPT | Performed by: STUDENT IN AN ORGANIZED HEALTH CARE EDUCATION/TRAINING PROGRAM

## 2023-04-13 PROCEDURE — 250N000013 HC RX MED GY IP 250 OP 250 PS 637: Performed by: PSYCHIATRY & NEUROLOGY

## 2023-04-13 PROCEDURE — 80069 RENAL FUNCTION PANEL: CPT | Performed by: STUDENT IN AN ORGANIZED HEALTH CARE EDUCATION/TRAINING PROGRAM

## 2023-04-13 PROCEDURE — 97110 THERAPEUTIC EXERCISES: CPT | Mod: GP

## 2023-04-13 RX ORDER — MIDODRINE HYDROCHLORIDE 10 MG/1
10 TABLET ORAL
Status: COMPLETED | OUTPATIENT
Start: 2023-04-14 | End: 2023-04-14

## 2023-04-13 RX ADMIN — RAMELTEON 8 MG: 8 TABLET, FILM COATED ORAL at 21:35

## 2023-04-13 RX ADMIN — ACETAMINOPHEN 650 MG: 325 TABLET, FILM COATED ORAL at 16:29

## 2023-04-13 RX ADMIN — SEVELAMER CARBONATE 800 MG: 800 TABLET, FILM COATED ORAL at 12:12

## 2023-04-13 RX ADMIN — ROPINIROLE HYDROCHLORIDE 0.5 MG: 0.25 TABLET, FILM COATED ORAL at 21:35

## 2023-04-13 RX ADMIN — LACOSAMIDE 100 MG: 100 TABLET, FILM COATED ORAL at 21:41

## 2023-04-13 RX ADMIN — MIDODRINE HYDROCHLORIDE 10 MG: 5 TABLET ORAL at 18:29

## 2023-04-13 RX ADMIN — APIXABAN 5 MG: 5 TABLET, FILM COATED ORAL at 21:35

## 2023-04-13 RX ADMIN — PANTOPRAZOLE SODIUM 40 MG: 40 TABLET, DELAYED RELEASE ORAL at 08:52

## 2023-04-13 RX ADMIN — LEVETIRACETAM 1000 MG: 500 TABLET, FILM COATED ORAL at 21:35

## 2023-04-13 RX ADMIN — OLANZAPINE 5 MG: 5 TABLET, ORALLY DISINTEGRATING ORAL at 21:36

## 2023-04-13 RX ADMIN — APIXABAN 5 MG: 5 TABLET, FILM COATED ORAL at 08:52

## 2023-04-13 RX ADMIN — MIDODRINE HYDROCHLORIDE 10 MG: 5 TABLET ORAL at 12:12

## 2023-04-13 RX ADMIN — RIFAXIMIN 550 MG: 550 TABLET ORAL at 21:35

## 2023-04-13 RX ADMIN — LACOSAMIDE 100 MG: 100 TABLET, FILM COATED ORAL at 12:12

## 2023-04-13 RX ADMIN — MELATONIN 5 MG TABLET 10 MG: at 21:35

## 2023-04-13 RX ADMIN — SEVELAMER CARBONATE 800 MG: 800 TABLET, FILM COATED ORAL at 18:29

## 2023-04-13 RX ADMIN — ACETAMINOPHEN 650 MG: 325 TABLET, FILM COATED ORAL at 23:37

## 2023-04-13 RX ADMIN — GUAIFENESIN 600 MG: 600 TABLET, EXTENDED RELEASE ORAL at 08:53

## 2023-04-13 RX ADMIN — TACROLIMUS 1 MG: 1 CAPSULE ORAL at 08:53

## 2023-04-13 RX ADMIN — RIFAXIMIN 550 MG: 550 TABLET ORAL at 08:52

## 2023-04-13 RX ADMIN — Medication 1 CAPSULE: at 08:52

## 2023-04-13 RX ADMIN — LACTULOSE 10 G: 10 SOLUTION ORAL at 08:52

## 2023-04-13 RX ADMIN — MIDODRINE HYDROCHLORIDE 10 MG: 5 TABLET ORAL at 08:52

## 2023-04-13 RX ADMIN — TACROLIMUS 1 MG: 1 CAPSULE ORAL at 21:35

## 2023-04-13 RX ADMIN — FOLIC ACID 1 MG: 1 TABLET ORAL at 08:53

## 2023-04-13 RX ADMIN — GUAIFENESIN 600 MG: 600 TABLET, EXTENDED RELEASE ORAL at 21:35

## 2023-04-13 RX ADMIN — SEVELAMER CARBONATE 800 MG: 800 TABLET, FILM COATED ORAL at 08:52

## 2023-04-13 ASSESSMENT — ACTIVITIES OF DAILY LIVING (ADL)
ADLS_ACUITY_SCORE: 53
ADLS_ACUITY_SCORE: 49
ADLS_ACUITY_SCORE: 49
ADLS_ACUITY_SCORE: 52
ADLS_ACUITY_SCORE: 49
ADLS_ACUITY_SCORE: 53
ADLS_ACUITY_SCORE: 49
ADLS_ACUITY_SCORE: 53
ADLS_ACUITY_SCORE: 53
ADLS_ACUITY_SCORE: 49
ADLS_ACUITY_SCORE: 49
ADLS_ACUITY_SCORE: 53

## 2023-04-13 NOTE — PROGRESS NOTES
[facility]    Medicine Progress Note - Hospitalist Service    Date of Admission:  1/21/2023    Assessment & Plan   Blanco Osborne is a 57 year-old male admitted on 1/21/2023 as a transfer from [facility] for evaluation of loculated pleural effusion. He has extensive PMH including h/o liver transplant 2016 due to alcohol abuse, pAF on chronic anticoagulation, history of diabetes mellitus type 2, CVA, hypertension, CHRISTIAN on BiPAP.  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 He was recovering in [facility] and was transferred to [facility] for consideration of chest tube placement and possible intrapleural lysis for worsening effusion.     4/13: No change.  Await TCU.  BP soft so continue midodrine and monitor.    Acute metabolic encephalopathy with delirium, multifactorial, -resolved  Hepatic encephalopathy  Chronic liver disease, history of liver transplant 2016  End-stage renal disease (ESRD), on hemodialysis (HD)  History of seizure, on Keppra and Vimpat  Waxing and waning mentation, coinciding with lactulose being held at [facility]  Earlier in hospital stay, remained confused and had pulled out tracheostomy tube, PICC line and pulled his dialysis catheter.  Lines replaced.  Psychiatry consulted, recent follow-up 2/21, 2/28, see notes.    Mental status continues to fluctuate with periods of alertness and increased sleepiness, overall improving.    Complicated, prolonged hospital course with mental status concerns multifactorial related to hepatic encephalopathy, end-stage renal disease on dialysis, past PEA arrest, seizure disorder, medication, and chronic liver disease with prior liver transplant.  As mental status fluctuates will continue to require one-to-one sitter as needed for added  safety and supervision.  Reassess daily.    Continue to reorient and redirect as able.  Avoid restraints if possible with goals of safety and close supervision (given multiple lines and tubes - dialysis catheter, PEG tube, IV, etc).    Maintain normal day/night cycles and sleep-wake cycles.  Minimize sedating medications as able.  Remains weak and deconditioned with complex, prolonged hospital course and limited mobility.  Encouragement and support offered daily to patient.    Continue Lactulose to 10 mg BID, monitor for symptoms; goal to have 2-3 bowel movements per day.    Ongoing hemodialysis, as per nephrology, 3X/week dialysis schedule.    Nephrology following    Continue ramelteon 8 mg at bedtime; monitor for side effects including AM sedation, reassess daily.    Started on olanzapine 5 mg BID and PRN earlier during this hospitalization. Delirium improved, discontinued AM dose of olanzapine on non-dialysis days.    Seizure disorder stable, continue Keppra and Vimpat; monitor, no recurrent seizures reported of recent.    Reconsult psychiatry as needed.    Neurology consult appreciated.  Patient has been started on ropinirole for restless leg syndrome.     Bilateral pleural effusions   Acute respiratory failure requiring tracheostomy  - resolved    Pneumonia  - resolved   ARDS  - resolved    Right pneumothorax - resolved   *Initial event was parainfluenza and strep pneumo pneumonia back in November 2022. Treated for ARDS. Had failed extubation x2 and tracheostomy performed on previous hospitalization. *Had additional complications of bilateral pneumothoraces as well as hydropneumothorax- pleural fluid grew candida parapsilosis  *Completed 4-week antifungal treatment. While in LTACH he was successfully weaned from the ventilator.  *Recently there were concern for worsening effusion while at LTACH and had thoracentesis 01/13 which returned exudative without growth on cultures. CT chest/abdomen/pelvis on 01/18  demonstrated worsening effusions and concerns for infected parapneumonic effusions with possible SBP.  Multiple pulmonologist at [facility] reviewed CT scan recommended transfer to [facility] for consideration of chest tube placement and possible intrapleural lysis  *Thoracic surgery (Dr. Jackson) consulted during admission   *CT chest 1/22, small effusions on imaging and so chest tube was not inserted.   ID consulted, see note 2/10.  *Patient pulled out his tracheostomy tube on the night 2/1-2/2 and is tolerating well without increased shortness of breath persistent cough or worsening hypoxia.  * Chest x-ray 2/3 with cardiomegaly, chronic small bilateral pleural effusions, no pulmonary edema; right internal jugular dialysis catheter in place.   3/12 Pulmonary consult, see note, concerns of hypercapnia, atelectasis, with consideration of BiPAP trial; no recommendations for antibiotics with infiltrates felt to be atelectasis.    Stable, continue to monitor respiratory status, recently stable on room air; occasional cough reported.    Encourage incentive spirometry as able, up to chair, deep breathing.    Wound care previously consulted for tracheostomy site care, monitor for signs and symptoms of infection, healing well - resolved.    Encourage ambulation.     Possible splenic infract  Wedge shaped area on CT scan chest and CT abdomen on 3/7/23.    Hematology consulted, suspicion for infarct was low.    TTE no thrombus or vegetations.    No abdominal pain CTM.     S/p liver transplant 2016   Chronic immunosuppression, on tacrolimus  Cirrhosis with ascites  Subacute bacterial peritonitis (SBP), resolved  *Main manifestations of this are hepatic encephalopathy and ascites.  Hepatologist at [place] felt that most likely reason for the cirrhosis was metabolic syndrome or alcohol intake.  There was no evidence of rejection on biopsy and there was some evidence of regeneration. Paracentesis done on 12/22/2022 showed  "lactobacillus indicating SBP, treated with Unasyn and Augmentin, finished 2-week course 1/12/2023.  Requires large-volume paracentesis periodically for recurring ascites.    *Paracentesis 1/13/2023 yielded about 7500 cc. Cultures NGTD. paracentesis on 1/24 with 4.8 L fluid removal  Paracentesis on 3/6/23 No SBP    Continue intermittent paracentesis as needed if develops symptomatic ascites.    Monitor for signs and symptoms of recurrent spontaneous bacterial peritonitis.    Further treatment for recurrent ascites with TIPS deferred to his Liver Transplant team and/or Hepatology, he has an appointment on 4/21/2023 with Hepatology, Dr. Simms.    Continue Tacrolimus 1 mg BID, rifaximin 550 mg BID.    Continue lactulose as ordered, titrate as needed to have 2-3 bms.    GI consult as needed in hospital for new acute issues, otherwise as outpatient.    Encourgae high protein diet.    CT abdomen on March 9, 2023 showed small to moderate ascites.     Goals of care   As per prior rounding provider, \"on 1/25 nursing had mentioned that patient had refused to undergo dialysis and want to stop care, palliative care was consulted.  Patient and his spouse denied wanting to stop care and wanted ongoing restorative care including full code\"    Monitor, Full Code status.    Needs placement to TCU -> SW working on placement     Diarrhea, improved, on lactulose for chronic liver disease    C difficile negative on 1/31. Secondary to lactulose. Discontinued rectal tube (2/12) as stools more formed.    Continue lactulose with goal of 2 to 3 soft bowel movement in 24 hours.     Paroxysmal atrial fibrillation  Chronic anticoagulation, apixaban  History of embolic strokes  Remains in sinus rhythm, on anticoagulation with apixaban indefinitely for stroke prophylaxis.      Monitor rhythm.    Continue apixaban anticoagulation, it was briefly held for fistula placement.    Monitor hemoglobin, see below.     Acute anemia  Patient has required " intermittent transfusions for on-going anemia.  Anemia most likely secondary to critical illness/chronic disease, chronic renal disease.  Baseline hemoglobin around 7-8.  Likely Epo resistance.    Hemoglobin stable at 8.4 on 3/30/23.    Monitor intermittently.     Hypercalcemia  Josue hypercalcemia of Immobility    Nephrology following.    Hold VIT D.    Continue to hold Phoslo.    Monitor labs.    Encourage ambulation.    Nephrology considering prolia, however patient's wife would like to hold off for now.     History PEA arrest   PEA arrest prior to his previous admission and 1 episode of PEA associated with desaturation on 12/20.  No structural cardiac disease.     Stable, continue cardiac Monitoring.     Chronic Hypotension  In the past has developed baseline hypotension with cirrhosis and prolonged critical illness.  On hemodialysis.  Receiving midodrine and albumin during dialysis.    Continue midodrine, as per nephrology.      History of seizure   After hypoxic event, in the context of baseline multifactorial encephalopathy secondary to his ongoing critical illness and prior cardiac arrests and prior strokes and cirrhosis with hepatic encephalopathy. MRI of head of 12/20/22 reveals no PRES or leptomeningeal enhancement but scattered.  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   No recent seizure activity.    Continue levetiracetam, lacosamide.    Seizure precautions.    Outpatient follow-up with neurology, consult inpatient if needed.     ESRD  Anemia due to renal disease  Hypotension during dialysis  Hyperphosphatemia    As per nephrology.    Nephrology reviewing vein mapping to assess options for internal access placement for dialysis, see note 3/16.    Underwent Fistula placement on  3/21/22.    Vascular surgery following-continue to use for tunnel catheter.  Outpatient follow-up with vascular surgery and for will need a follow-up ultrasound of the  fistula in 6 weeks to assess patency.    Started sevalmer 800mg TID with meals on 3/27/23.    Complained of cramping with dialysis. Tried a dose of oxycodone prior to dialysis but this was not helpful. He will discuss the cramping with nephrology.     Diabetes mellitus type 2  *Hemoglobin A1c on November 2022 was 4.7  *By report, on Lantus insulin in the past.  Blood sugar checks and sliding scale insulin previously discontinued as has been stable without insulin needs.    Monitor with periodic blood sugar checks as needed.     Hypomagnesemia    Resolved with replacement.     Severe malnutrition, protein and calorie type  Moderate dysphagia  G-tube feedings    Continue with tube feeds via PEG tube.    IR replaced the PEG tube on 2/8/2023, monitor.    Nutritionist consulted and following for tube feeds.    SLP following as well. Oral diet as per speech and language therapy (SLP). Patient hopeful to advance to thin liquids soon.    2/20 Nutrition following for tube feeds.    Now undergoing PRN tube feeds.    Encourage high-protein diet.    Nutrition notes reviewed, patient eating 0 to 100% of meals.  Continue current interventions.  As needed tube feed boluses available if patient consumes less than 50% of the meal.    Family encouraging patient to eat high protein diet including protein bars    Family asking about g tube removal. Per the EMR, last tube feed bolus was 3/22 if adequately documented. He refuses his tube feeds often per the dietitician. At this point, would favor keeping it in place given overall poor prognosis with ESRD, cirrhosis of transplanted liver, persistent encephalopathy, and inadequate oral intake. Event if he refuses, he is at risk for developing new infection or other decompensation and thus g tube as safety net.      Anxiety  Fluoxetine was discontinued as per psychiatry recommendation on 2/2 (see consult note for details).    Stable, monitor; comfort and reassurance offered during  visit.    Psychiatry follow-up.     Restless leg  Syndrome    Patient started on ropiranole by neurology.       Diet: Snacks/Supplements Adult: Other; Gelatein+ with brkfst and lunch, and magic cup with dinner (RD); With Meals  Combination Diet Regular Diet; Mildly Thick (level 2) (OK for fresh fruit (e.g., pineapple) per SLP)  Adult Formula Bolus Feeding: Novasource Renal; Route: Gastrostomy; 3 times daily; Volume per Bolus: 240; mL(s); Back up bolus for when pt consumes <50% of meals: 80 mL/hr x 3 hrs  Room Service    DVT Prophylaxis: DOAC  Nixon Catheter: Not present  Lines: PRESENT      CVC Double Lumen Right External jugular Tunneled-Site Assessment: WDL  Hemodialysis Vascular Access Arteriovenous fistula Left Forearm-Site Assessment: WDL;Thrill present;Bruit present      Cardiac Monitoring: None  Code Status: Full Code      Clinically Significant Risk Factors           # Hypercalcemia: Highest Ca = 10.3 mg/dL in last 2 days, will monitor as appropriate    # Hypoalbuminemia: Lowest albumin = 1.9 g/dL at 1/23/2023  6:38 AM, will monitor as appropriate   # Thrombocytopenia: Lowest platelets = 95 in last 2 days, will monitor for bleeding          # Severe Malnutrition: based on nutrition assessment        Disposition Plan      Expected Discharge Date: 04/19/2023, 12:00 PM  Discharge Delays: Placement - LTC  Destination: inpatient rehabilitation facility  Discharge Comments: Dialysis pt MWF. Will need placement TCU/LTC. [facility] willing to accept once no sitter, SW to follow up when sitter free          Pako Tan MD  Hospitalist Service  [facility]  Securely message with Matthieu (more info)  Text page via [facility] Paging/Directory   ______________________________________________________________________    Interval History     No new complaints.  Doing well    Low BP noted but patient asymptomatic.  Got bit too dry yesterday in dialysis    Physical Exam   BP (!) 80/47 (BP Location: Right  arm)   Pulse 73   Temp 97.5  F (36.4  C) (Oral)   Resp 18   Wt 80.8 kg (178 lb 2.1 oz)   SpO2 99%   BMI 24.16 kg/m    Gen- pleasant   Neck- supple  CVS- I+II+ no m/r/g  RS- CTAB  Abdo- soft, no tenderness . No g/r/r  Ext- no edema     Medical Decision Making       36 MINUTES SPENT BY ME on the date of service doing chart review, history, exam, documentation & further activities per the note.      Data   ------------------------- PAST 24 HR DATA REVIEWED -----------------------------------------------

## 2023-04-13 NOTE — PLAN OF CARE
Goal Outcome Evaluation:                      DATE & TIME: 4/12/23, pm shift  Cognitive Concerns/ Orientation : A&disoriented to situation. Confuse. Can be impulsive   BEHAVIOR & AGGRESSION TOOL COLOR: Green    ABNL VS/O2: BP soft, 103/62. On RA  MOBILITY: A x1 GB/W  PAIN MANAGMENT: denied pain this shift;     DIET: Regular diet, mildly thickened liquids (level 2); takes pills without difficulty; appetite good; taking supplements  BOWEL/BLADDER: Continent. Voiding. On HD M-W-F. Had BM x 2 this shift. Refused bedtime Lactoluse.  ABNL LAB/BG: Na 132, Hgb 8.7, Platelet 95, WBC 2.9, Albumin 3.3  DRAIN/DEVICES: R chest HD CVC, PEG tube clamped; L arm fistula with good bruit/thrill  SKIN: Scattered bruising, scabbing, dry/scaly skin, L fistula site scabbed  TESTS/PROCEDURES: HD MWF. Had dialysis today, 2.5L removed.  D/C DATE: Discharge pending TCU placement.   OTHER IMPORTANT INFO: Plan to keep G-tube for possible future needs.

## 2023-04-13 NOTE — PROGRESS NOTES
Notes, labs, vitals reviewed.    No new results today - still pending    1.  ESKD.  HD tmrw.  Orders placed.  2.  Hypercalcemia.  Borderline.  3.  Anemia.  EPO 10k qHD.  4.  Hypotension.  Add midodrine 10 mg prn mid-run on HD for BP < 100.

## 2023-04-13 NOTE — PLAN OF CARE
DATE & TIME: 4/13/23 8423-6072    Cognitive Concerns/ Orientation : Pt A/Ox3, disoriented to situation. Confused. Impulsive at times.   BEHAVIOR & AGGRESSION TOOL COLOR: Green   ABNL VS/O2: VSS on RA  MOBILITY: Assist x1 with walker and gait belt, fall risk  PAIN MANAGMENT: Denies  DIET: Regular, mildly thick  BOWEL/BLADDER: Continent  ABNL LAB/BG: Na 132/Cr 5.42  DRAIN/DEVICES: R chest HD CVC, PEG tube clamped; L arm fistula  SKIN: Scattered bruising, scabbing, dry/scaly skin  D/C DATE: Discharge to TCU pending placement.  OTHER IMPORTANT INFO: Pt slept intermittently overnight.

## 2023-04-13 NOTE — PLAN OF CARE
Goal Outcome Evaluation:    DATE & TIME: 4/13/23 4552-6567   Cognitive Concerns/ Orientation : Pt A/Ox3, disoriented to situation. Confused. Impulsive at times.   BEHAVIOR & AGGRESSION TOOL COLOR: Green   ABNL VS/O2: VSS on RA  MOBILITY: Assist x1 with walker and gait belt. Ambulating in room and hallway. Up in chair for both meals  PAIN MANAGMENT: Denies  DIET: Regular, mildly thick. Good appetite   BOWEL/BLADDER: Continent. Two BM's this shift  ABNL LAB/BG: Cr  4.63, GFR 14  DRAIN/DEVICES: R chest HD CVC, PEG tube clamped; L arm fistula  SKIN: Scattered bruising, scabbing, dry/scaly skin  D/C DATE: Discharge to TCU pending placement.  OTHER IMPORTANT INFO:

## 2023-04-14 LAB
ALBUMIN SERPL BCG-MCNC: 3.5 G/DL (ref 3.5–5.2)
ANION GAP SERPL CALCULATED.3IONS-SCNC: 13 MMOL/L (ref 7–15)
BUN SERPL-MCNC: 79.1 MG/DL (ref 6–20)
CALCIUM SERPL-MCNC: 10.3 MG/DL (ref 8.6–10)
CHLORIDE SERPL-SCNC: 94 MMOL/L (ref 98–107)
CREAT SERPL-MCNC: 5.4 MG/DL (ref 0.67–1.17)
DEPRECATED HCO3 PLAS-SCNC: 25 MMOL/L (ref 22–29)
ERYTHROCYTE [DISTWIDTH] IN BLOOD BY AUTOMATED COUNT: 15.5 % (ref 10–15)
FERRITIN SERPL-MCNC: 286 NG/ML (ref 31–409)
GFR SERPL CREATININE-BSD FRML MDRD: 12 ML/MIN/1.73M2
GLUCOSE SERPL-MCNC: 96 MG/DL (ref 70–99)
HCT VFR BLD AUTO: 29.1 % (ref 40–53)
HGB BLD-MCNC: 8.9 G/DL (ref 13.3–17.7)
IRON BINDING CAPACITY (ROCHE): 210 UG/DL (ref 240–430)
IRON SATN MFR SERPL: 29 % (ref 15–46)
IRON SERPL-MCNC: 60 UG/DL (ref 61–157)
MAGNESIUM SERPL-MCNC: 1.9 MG/DL (ref 1.7–2.3)
MCH RBC QN AUTO: 29.7 PG (ref 26.5–33)
MCHC RBC AUTO-ENTMCNC: 30.6 G/DL (ref 31.5–36.5)
MCV RBC AUTO: 97 FL (ref 78–100)
PHOSPHATE SERPL-MCNC: 5.9 MG/DL (ref 2.5–4.5)
PLATELET # BLD AUTO: 109 10E3/UL (ref 150–450)
POTASSIUM SERPL-SCNC: 5 MMOL/L (ref 3.4–5.3)
RBC # BLD AUTO: 3 10E6/UL (ref 4.4–5.9)
SODIUM SERPL-SCNC: 132 MMOL/L (ref 136–145)
WBC # BLD AUTO: 3.6 10E3/UL (ref 4–11)

## 2023-04-14 PROCEDURE — 250N000012 HC RX MED GY IP 250 OP 636 PS 637: Performed by: STUDENT IN AN ORGANIZED HEALTH CARE EDUCATION/TRAINING PROGRAM

## 2023-04-14 PROCEDURE — 90937 HEMODIALYSIS REPEATED EVAL: CPT

## 2023-04-14 PROCEDURE — 250N000013 HC RX MED GY IP 250 OP 250 PS 637: Performed by: STUDENT IN AN ORGANIZED HEALTH CARE EDUCATION/TRAINING PROGRAM

## 2023-04-14 PROCEDURE — 99233 SBSQ HOSP IP/OBS HIGH 50: CPT | Performed by: INTERNAL MEDICINE

## 2023-04-14 PROCEDURE — 250N000011 HC RX IP 250 OP 636: Performed by: INTERNAL MEDICINE

## 2023-04-14 PROCEDURE — 250N000013 HC RX MED GY IP 250 OP 250 PS 637: Performed by: HOSPITALIST

## 2023-04-14 PROCEDURE — 99232 SBSQ HOSP IP/OBS MODERATE 35: CPT | Performed by: INTERNAL MEDICINE

## 2023-04-14 PROCEDURE — 36415 COLL VENOUS BLD VENIPUNCTURE: CPT | Performed by: STUDENT IN AN ORGANIZED HEALTH CARE EDUCATION/TRAINING PROGRAM

## 2023-04-14 PROCEDURE — 250N000013 HC RX MED GY IP 250 OP 250 PS 637: Performed by: PSYCHIATRY & NEUROLOGY

## 2023-04-14 PROCEDURE — 80069 RENAL FUNCTION PANEL: CPT | Performed by: STUDENT IN AN ORGANIZED HEALTH CARE EDUCATION/TRAINING PROGRAM

## 2023-04-14 PROCEDURE — 250N000013 HC RX MED GY IP 250 OP 250 PS 637: Performed by: INTERNAL MEDICINE

## 2023-04-14 PROCEDURE — 120N000001 HC R&B MED SURG/OB

## 2023-04-14 PROCEDURE — 83735 ASSAY OF MAGNESIUM: CPT | Performed by: STUDENT IN AN ORGANIZED HEALTH CARE EDUCATION/TRAINING PROGRAM

## 2023-04-14 PROCEDURE — 634N000001 HC RX 634: Performed by: INTERNAL MEDICINE

## 2023-04-14 PROCEDURE — 82728 ASSAY OF FERRITIN: CPT | Performed by: INTERNAL MEDICINE

## 2023-04-14 PROCEDURE — 83550 IRON BINDING TEST: CPT | Performed by: INTERNAL MEDICINE

## 2023-04-14 PROCEDURE — 85027 COMPLETE CBC AUTOMATED: CPT | Performed by: INTERNAL MEDICINE

## 2023-04-14 PROCEDURE — 258N000003 HC RX IP 258 OP 636: Performed by: INTERNAL MEDICINE

## 2023-04-14 RX ORDER — ALBUMIN (HUMAN) 12.5 G/50ML
50 SOLUTION INTRAVENOUS
Status: DISCONTINUED | OUTPATIENT
Start: 2023-04-14 | End: 2023-04-14

## 2023-04-14 RX ADMIN — RIFAXIMIN 550 MG: 550 TABLET ORAL at 11:28

## 2023-04-14 RX ADMIN — PANTOPRAZOLE SODIUM 40 MG: 40 TABLET, DELAYED RELEASE ORAL at 11:29

## 2023-04-14 RX ADMIN — RIFAXIMIN 550 MG: 550 TABLET ORAL at 19:57

## 2023-04-14 RX ADMIN — ACETAMINOPHEN 650 MG: 325 TABLET, FILM COATED ORAL at 19:57

## 2023-04-14 RX ADMIN — LACTULOSE 10 G: 10 SOLUTION ORAL at 11:28

## 2023-04-14 RX ADMIN — HEPARIN SODIUM 1900 UNITS: 1000 INJECTION INTRAVENOUS; SUBCUTANEOUS at 09:35

## 2023-04-14 RX ADMIN — SEVELAMER CARBONATE 800 MG: 800 TABLET, FILM COATED ORAL at 17:10

## 2023-04-14 RX ADMIN — MIDODRINE HYDROCHLORIDE 10 MG: 5 TABLET ORAL at 08:57

## 2023-04-14 RX ADMIN — MIDODRINE HYDROCHLORIDE 10 MG: 10 TABLET ORAL at 10:25

## 2023-04-14 RX ADMIN — TACROLIMUS 1 MG: 1 CAPSULE ORAL at 11:29

## 2023-04-14 RX ADMIN — APIXABAN 5 MG: 5 TABLET, FILM COATED ORAL at 11:28

## 2023-04-14 RX ADMIN — FOLIC ACID 1 MG: 1 TABLET ORAL at 11:29

## 2023-04-14 RX ADMIN — ACETAMINOPHEN 650 MG: 325 TABLET, FILM COATED ORAL at 06:18

## 2023-04-14 RX ADMIN — OLANZAPINE 5 MG: 5 TABLET, ORALLY DISINTEGRATING ORAL at 21:47

## 2023-04-14 RX ADMIN — EPOETIN ALFA-EPBX 10000 UNITS: 10000 INJECTION, SOLUTION INTRAVENOUS; SUBCUTANEOUS at 09:33

## 2023-04-14 RX ADMIN — SEVELAMER CARBONATE 800 MG: 800 TABLET, FILM COATED ORAL at 11:38

## 2023-04-14 RX ADMIN — LEVETIRACETAM 1000 MG: 500 TABLET, FILM COATED ORAL at 21:46

## 2023-04-14 RX ADMIN — SODIUM CHLORIDE 300 ML: 9 INJECTION, SOLUTION INTRAVENOUS at 09:32

## 2023-04-14 RX ADMIN — OLANZAPINE 5 MG: 5 TABLET, ORALLY DISINTEGRATING ORAL at 11:34

## 2023-04-14 RX ADMIN — ROPINIROLE HYDROCHLORIDE 0.5 MG: 0.25 TABLET, FILM COATED ORAL at 21:47

## 2023-04-14 RX ADMIN — RAMELTEON 8 MG: 8 TABLET, FILM COATED ORAL at 19:57

## 2023-04-14 RX ADMIN — APIXABAN 5 MG: 5 TABLET, FILM COATED ORAL at 19:57

## 2023-04-14 RX ADMIN — ACETAMINOPHEN 650 MG: 325 TABLET, FILM COATED ORAL at 10:25

## 2023-04-14 RX ADMIN — Medication 1 CAPSULE: at 11:29

## 2023-04-14 RX ADMIN — MIDODRINE HYDROCHLORIDE 10 MG: 5 TABLET ORAL at 13:23

## 2023-04-14 RX ADMIN — GUAIFENESIN 600 MG: 600 TABLET, EXTENDED RELEASE ORAL at 11:29

## 2023-04-14 RX ADMIN — TACROLIMUS 1 MG: 1 CAPSULE ORAL at 19:57

## 2023-04-14 RX ADMIN — SODIUM CHLORIDE 250 ML: 9 INJECTION, SOLUTION INTRAVENOUS at 09:32

## 2023-04-14 RX ADMIN — LACOSAMIDE 100 MG: 100 TABLET, FILM COATED ORAL at 11:28

## 2023-04-14 RX ADMIN — MIDODRINE HYDROCHLORIDE 10 MG: 5 TABLET ORAL at 17:10

## 2023-04-14 RX ADMIN — GUAIFENESIN 600 MG: 600 TABLET, EXTENDED RELEASE ORAL at 19:57

## 2023-04-14 ASSESSMENT — ACTIVITIES OF DAILY LIVING (ADL)
ADLS_ACUITY_SCORE: 49
ADLS_ACUITY_SCORE: 52
ADLS_ACUITY_SCORE: 49
ADLS_ACUITY_SCORE: 48
ADLS_ACUITY_SCORE: 48
ADLS_ACUITY_SCORE: 49
ADLS_ACUITY_SCORE: 48

## 2023-04-14 NOTE — PLAN OF CARE
DATE & TIME: 4/13/23 8725-8990    Cognitive Concerns/ Orientation : Pt A/Ox2, disoriented to place and situation. Confused.   BEHAVIOR & AGGRESSION TOOL COLOR: Green   ABNL VS/O2: VSS on RA  MOBILITY: Assist x1 with gait belt and walker, fall risk. Pt ambulated in hallway before bed tonight.  PAIN MANAGMENT: Complaints of back pain, managed with PRN Tylenol  DIET: Regular, mildly thick   BOWEL/BLADDER: Continent  ABNL LAB/BG: Cr 4.63/Ca 10.3  DRAIN/DEVICES: R chest HD CVC, PEG tube clamped, L arm fistula  SKIN: Scattered bruising, scabbing, dry/scaly skin  D/C DATE: Discharge to TCU pending placement  OTHER IMPORTANT INFO: Pt slept well overnight waking up occasionally. Tylenol given this morning and early breakfast has been ordered.

## 2023-04-14 NOTE — PROGRESS NOTES
Renal Medicine Progress Note            Assessment/Plan:     Assessment:  1) End Stage Kidney disease on chronic HD, MWF schedule    Access: right CVC, had left upper AVF (radioccephalic) created 3/21/23, in maturation phase.   Midodrine for hemodynamics, hypotension    2) anemia: Hgb 8.9 today and stable. Last check iron status 3/25/23, ferritin 184 and 3/12/23 iron sats 26%.    Some intermittent venofer (100mg 4/7, 200mg 4/5, 100mg 2/20). willl recheck iron stores and may benefit from scheduled maintenance iron or iron course. Variable dosing epogen based on ordering provider. This week had 4000 units 4/10, 10,000 units 4/12 and today.     3) Mild hypercalcemia stable. General trend improvement with increased activity, ambulation. Never received any denosumab.      Plan/Recs:  1) HD today per MWF schedule  2) 10,000 units  epo given today  3) add on iron sats, ferritin today  4) next HD Monday 4/17    DO [facility]  Office: [phone]  Cell: [phone]        Interval History:     Pt seen on dialysis. Complaining of back pain. He wonders about something more then tylenol for this discomfort. Reports he is more active. Since last seen by this provider (about one month ago), he is ambulating more, appears general trend of less confusion and agitation but still present per notes.           Medications and Allergies:       - MEDICATION INSTRUCTIONS for Dialysis Patients -   Does not apply See Admin Instructions     apixaban ANTICOAGULANT  5 mg Oral BID     epoetin mirta-epbx  10,000 Units Intravenous Once in dialysis/CRRT     folic acid  1 mg Oral or Feeding Tube Daily     guaiFENesin  600 mg Oral BID     sodium chloride (PF) 0.9%  10 mL Intracatheter Once in dialysis/CRRT    Followed by     heparin  1.3-2.6 mL Intracatheter Once in dialysis/CRRT     sodium chloride (PF) 0.9%  10 mL Intracatheter Once in dialysis/CRRT    Followed by     heparin  1.3-2.6 mL Intracatheter Once in  dialysis/CRRT     lacosamide  100 mg Oral Q12H     lactulose  10 g Oral BID     levETIRAcetam  1,000 mg Oral Q24H     lidocaine   Transdermal Q8H BIJU     menthol   Transdermal Q8H     midodrine  10 mg Oral or Feeding Tube TID w/meals     multivitamin RENAL  1 capsule Oral Daily     - MEDICATION INSTRUCTIONS -   Does not apply Once     OLANZapine zydis  5 mg Oral At Bedtime     OLANZapine zydis  5 mg Oral Once per day on Mon Wed Fri     pantoprazole  40 mg Oral QAM AC     ramelteon  8 mg Oral QPM     rifaximin  550 mg Oral or Feeding Tube BID     rOPINIRole  0.5 mg Oral At Bedtime     sevelamer carbonate  800 mg Oral TID w/meals     sodium chloride (PF)  3 mL Intracatheter Q8H     sodium chloride (PF)  9 mL Intracatheter During Dialysis/CRRT (from stock)     sodium chloride (PF)  9 mL Intracatheter During Dialysis/CRRT (from stock)     tacrolimus  1 mg Oral Q12H      No Known Allergies         Physical Exam:   Vitals were reviewed  BP (!) 89/59   Pulse 69   Temp 97.4  F (36.3  C) (Oral)   Resp 21   Wt 80.8 kg (178 lb 2.1 oz)   SpO2 94%   BMI 24.16 kg/m      Wt Readings from Last 3 Encounters:   04/12/23 80.8 kg (178 lb 2.1 oz)   01/21/23 82.4 kg (181 lb 11.2 oz)   12/28/22 96 kg (211 lb 10.3 oz)     No intake or output data in the 24 hours ending 04/14/23 0933    GENERAL APPEARANCE:   HEENT:  Eyes/ears/nose/neck grossly normal  RESP: lungs cta b c good efforts, no crackles, rhonchi or wheezes  CV: RRR, nl S1/S2,  ABDOMEN: o/s/nt/nd, bs present  EXTREMITIES/SKIN: no c/c/rashes/lesions; no edema  LINES:  Right tunneled dialysis catheter without abnormalities           Data:     BMP  Recent Labs   Lab 04/14/23  0725 04/13/23  1153 04/12/23  0755 04/11/23  0650   * 138 132* 133*   POTASSIUM 5.0 4.1 4.6 4.3   CHLORIDE 94* 98 93* 95*   KELLY 10.3* 10.3* 10.3* 10.0   CO2 25 27 24 26   BUN 79.1* 54.9* 73.4* 51.1*   CR 5.40* 4.63* 5.42* 4.42*   GLC 96 141* 103* 86     CBC  Recent Labs   Lab 04/14/23  0757  04/12/23  0755 04/08/23  0859   WBC 3.6* 2.9* 3.4*   HGB 8.9* 8.7* 9.0*   HCT 29.1* 28.7* 30.3*   MCV 97 97 101*   * 95* 95*     Lab Results   Component Value Date    AST 16 04/05/2023    ALT <5 (L) 04/05/2023    ALKPHOS 184 (H) 04/05/2023    BILITOTAL 0.4 04/05/2023    CHANDLER 69 (H) 03/25/2023     Lab Results   Component Value Date    INR 1.36 (H) 12/21/2022       Attestation:  I have reviewed today's vital signs, notes, medications, labs and imaging.    DO [facility] - Nephrology  Office: [phone]  Cell: [phone]

## 2023-04-14 NOTE — PROGRESS NOTES
[facility]    Medicine Progress Note - Hospitalist Service    Date of Admission:  1/21/2023    Assessment & Plan   Blanco Osborne is a 57 year-old male admitted on 1/21/2023 as a transfer from [facility] for evaluation of loculated pleural effusion. He has extensive PMH including h/o liver transplant 2016 due to alcohol abuse, pAF on chronic anticoagulation, history of diabetes mellitus type 2, CVA, hypertension, CHRISTIAN on BiPAP.  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 He was recovering in [facility] and was transferred to [facility] for consideration of chest tube placement and possible intrapleural lysis for worsening effusion.     4/14: No change.  Await TCU.  BP soft so continue midodrine and monitor.    Acute metabolic encephalopathy with delirium, multifactorial, -resolved  Hepatic encephalopathy  Chronic liver disease, history of liver transplant 2016  End-stage renal disease (ESRD), on hemodialysis (HD)  History of seizure, on Keppra and Vimpat  Waxing and waning mentation, coinciding with lactulose being held at [facility]  Earlier in hospital stay, remained confused and had pulled out tracheostomy tube, PICC line and pulled his dialysis catheter.  Lines replaced.  Psychiatry consulted, recent follow-up 2/21, 2/28, see notes.    Mental status continues to fluctuate with periods of alertness and increased sleepiness, overall improving.    Complicated, prolonged hospital course with mental status concerns multifactorial related to hepatic encephalopathy, end-stage renal disease on dialysis, past PEA arrest, seizure disorder, medication, and chronic liver disease with prior liver transplant.  As mental status fluctuates will continue to require one-to-one sitter as needed for added  safety and supervision.  Reassess daily.    Continue to reorient and redirect as able.  Avoid restraints if possible with goals of safety and close supervision (given multiple lines and tubes - dialysis catheter, PEG tube, IV, etc).    Maintain normal day/night cycles and sleep-wake cycles.  Minimize sedating medications as able.  Remains weak and deconditioned with complex, prolonged hospital course and limited mobility.  Encouragement and support offered daily to patient.    Continue Lactulose to 10 mg BID, monitor for symptoms; goal to have 2-3 bowel movements per day.    Ongoing hemodialysis, as per nephrology, 3X/week dialysis schedule.    Nephrology following    Continue ramelteon 8 mg at bedtime; monitor for side effects including AM sedation, reassess daily.    Started on olanzapine 5 mg BID and PRN earlier during this hospitalization. Delirium improved, discontinued AM dose of olanzapine on non-dialysis days.    Seizure disorder stable, continue Keppra and Vimpat; monitor, no recurrent seizures reported of recent.    Reconsult psychiatry as needed.    Neurology consult appreciated.  Patient has been started on ropinirole for restless leg syndrome.     Bilateral pleural effusions   Acute respiratory failure requiring tracheostomy  - resolved    Pneumonia  - resolved   ARDS  - resolved    Right pneumothorax - resolved   *Initial event was parainfluenza and strep pneumo pneumonia back in November 2022. Treated for ARDS. Had failed extubation x2 and tracheostomy performed on previous hospitalization. *Had additional complications of bilateral pneumothoraces as well as hydropneumothorax- pleural fluid grew candida parapsilosis  *Completed 4-week antifungal treatment. While in LTACH he was successfully weaned from the ventilator.  *Recently there were concern for worsening effusion while at LTACH and had thoracentesis 01/13 which returned exudative without growth on cultures. CT chest/abdomen/pelvis on 01/18  demonstrated worsening effusions and concerns for infected parapneumonic effusions with possible SBP.  Multiple pulmonologist at [facility] reviewed CT scan recommended transfer to [facility] for consideration of chest tube placement and possible intrapleural lysis  *Thoracic surgery (Dr. Jackson) consulted during admission   *CT chest 1/22, small effusions on imaging and so chest tube was not inserted.   ID consulted, see note 2/10.  *Patient pulled out his tracheostomy tube on the night 2/1-2/2 and is tolerating well without increased shortness of breath persistent cough or worsening hypoxia.  * Chest x-ray 2/3 with cardiomegaly, chronic small bilateral pleural effusions, no pulmonary edema; right internal jugular dialysis catheter in place.   3/12 Pulmonary consult, see note, concerns of hypercapnia, atelectasis, with consideration of BiPAP trial; no recommendations for antibiotics with infiltrates felt to be atelectasis.    Stable, continue to monitor respiratory status, recently stable on room air; occasional cough reported.    Encourage incentive spirometry as able, up to chair, deep breathing.    Wound care previously consulted for tracheostomy site care, monitor for signs and symptoms of infection, healing well - resolved.    Encourage ambulation.     Possible splenic infract  Wedge shaped area on CT scan chest and CT abdomen on 3/7/23.    Hematology consulted, suspicion for infarct was low.    TTE no thrombus or vegetations.    No abdominal pain CTM.     S/p liver transplant 2016   Chronic immunosuppression, on tacrolimus  Cirrhosis with ascites  Subacute bacterial peritonitis (SBP), resolved  *Main manifestations of this are hepatic encephalopathy and ascites.  Hepatologist at [facility] felt that most likely reason for the cirrhosis was metabolic syndrome or alcohol intake.  There was no evidence of rejection on biopsy and there was some evidence of regeneration. Paracentesis done on 12/22/2022 showed  "lactobacillus indicating SBP, treated with Unasyn and Augmentin, finished 2-week course 1/12/2023.  Requires large-volume paracentesis periodically for recurring ascites.    *Paracentesis 1/13/2023 yielded about 7500 cc. Cultures NGTD. paracentesis on 1/24 with 4.8 L fluid removal  Paracentesis on 3/6/23 No SBP    Continue intermittent paracentesis as needed if develops symptomatic ascites.    Monitor for signs and symptoms of recurrent spontaneous bacterial peritonitis.    Further treatment for recurrent ascites with TIPS deferred to his Liver Transplant team and/or Hepatology, he has an appointment on 4/21/2023 with Hepatology, Dr. Simms.    Continue Tacrolimus 1 mg BID, rifaximin 550 mg BID.    Continue lactulose as ordered, titrate as needed to have 2-3 bms.    GI consult as needed in hospital for new acute issues, otherwise as outpatient.    Encourgae high protein diet.    CT abdomen on March 9, 2023 showed small to moderate ascites.     Goals of care   As per prior rounding provider, \"on 1/25 nursing had mentioned that patient had refused to undergo dialysis and want to stop care, palliative care was consulted.  Patient and his spouse denied wanting to stop care and wanted ongoing restorative care including full code\"    Monitor, Full Code status.    Needs placement to TCU -> SW working on placement     Diarrhea, improved, on lactulose for chronic liver disease    C difficile negative on 1/31. Secondary to lactulose. Discontinued rectal tube (2/12) as stools more formed.    Continue lactulose with goal of 2 to 3 soft bowel movement in 24 hours.     Paroxysmal atrial fibrillation  Chronic anticoagulation, apixaban  History of embolic strokes  Remains in sinus rhythm, on anticoagulation with apixaban indefinitely for stroke prophylaxis.      Monitor rhythm.    Continue apixaban anticoagulation, it was briefly held for fistula placement.    Monitor hemoglobin, see below.     Acute anemia  Patient has required " intermittent transfusions for on-going anemia.  Anemia most likely secondary to critical illness/chronic disease, chronic renal disease.  Baseline hemoglobin around 7-8.  Likely Epo resistance.    Hemoglobin stable at 8.4 on 3/30/23.    Monitor intermittently.     Hypercalcemia  Josue hypercalcemia of Immobility    Nephrology following.    Hold VIT D.    Continue to hold Phoslo.    Monitor labs.    Encourage ambulation.    Nephrology considering prolia, however patient's wife would like to hold off for now.     History PEA arrest   PEA arrest prior to his previous admission and 1 episode of PEA associated with desaturation on 12/20.  No structural cardiac disease.     Stable, continue cardiac Monitoring.     Chronic Hypotension  In the past has developed baseline hypotension with cirrhosis and prolonged critical illness.  On hemodialysis.  Receiving midodrine and albumin during dialysis.    Continue midodrine, as per nephrology.      History of seizure   After hypoxic event, in the context of baseline multifactorial encephalopathy secondary to his ongoing critical illness and prior cardiac arrests and prior strokes and cirrhosis with hepatic encephalopathy. MRI of head of 12/20/22 reveals no PRES or leptomeningeal enhancement but scattered.  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   No recent seizure activity.    Continue levetiracetam, lacosamide.    Seizure precautions.    Outpatient follow-up with neurology, consult inpatient if needed.     ESRD  Anemia due to renal disease  Hypotension during dialysis  Hyperphosphatemia    As per nephrology.    Nephrology reviewing vein mapping to assess options for internal access placement for dialysis, see note 3/16.    Underwent Fistula placement on  3/21/22.    Vascular surgery following-continue to use for tunnel catheter.  Outpatient follow-up with vascular surgery and for will need a follow-up ultrasound of the  fistula in 6 weeks to assess patency.    Started sevalmer 800mg TID with meals on 3/27/23.    Complained of cramping with dialysis. Tried a dose of oxycodone prior to dialysis but this was not helpful. He will discuss the cramping with nephrology.     Diabetes mellitus type 2  *Hemoglobin A1c on November 2022 was 4.7  *By report, on Lantus insulin in the past.  Blood sugar checks and sliding scale insulin previously discontinued as has been stable without insulin needs.    Monitor with periodic blood sugar checks as needed.     Hypomagnesemia    Resolved with replacement.     Severe malnutrition, protein and calorie type  Moderate dysphagia  G-tube feedings    Continue with tube feeds via PEG tube.    IR replaced the PEG tube on 2/8/2023, monitor.    Nutritionist consulted and following for tube feeds.    SLP following as well. Oral diet as per speech and language therapy (SLP). Patient hopeful to advance to thin liquids soon.    2/20 Nutrition following for tube feeds.    Now undergoing PRN tube feeds.    Encourage high-protein diet.    Nutrition notes reviewed, patient eating 0 to 100% of meals.  Continue current interventions.  As needed tube feed boluses available if patient consumes less than 50% of the meal.    Family encouraging patient to eat high protein diet including protein bars    Family asking about g tube removal. Per the EMR, last tube feed bolus was 3/22 if adequately documented. He refuses his tube feeds often per the dietitician. At this point, would favor keeping it in place given overall poor prognosis with ESRD, cirrhosis of transplanted liver, persistent encephalopathy, and inadequate oral intake. Event if he refuses, he is at risk for developing new infection or other decompensation and thus g tube as safety net.      Anxiety  Fluoxetine was discontinued as per psychiatry recommendation on 2/2 (see consult note for details).    Stable, monitor; comfort and reassurance offered during  visit.    Psychiatry follow-up.     Restless leg  Syndrome    Patient started on ropiranole by neurology.       Diet: Snacks/Supplements Adult: Other; Gelatein+ with brkfst and lunch, and magic cup with dinner (RD); With Meals  Combination Diet Regular Diet; Mildly Thick (level 2) (OK for fresh fruit (e.g., pineapple) per SLP)  Adult Formula Bolus Feeding: Novasource Renal; Route: Gastrostomy; 3 times daily; Volume per Bolus: 240; mL(s); Back up bolus for when pt consumes <50% of meals: 80 mL/hr x 3 hrs  Room Service    DVT Prophylaxis: DOAC  Nixon Catheter: Not present  Lines: PRESENT      CVC Double Lumen Right External jugular Tunneled-Site Assessment: WDL  Hemodialysis Vascular Access Arteriovenous fistula Left Forearm-Site Assessment: WDL      Cardiac Monitoring: None  Code Status: Full Code      Clinically Significant Risk Factors           # Hypercalcemia: Highest Ca = 10.3 mg/dL in last 2 days, will monitor as appropriate    # Hypoalbuminemia: Lowest albumin = 1.9 g/dL at 1/23/2023  6:38 AM, will monitor as appropriate   # Thrombocytopenia: Lowest platelets = 109 in last 2 days, will monitor for bleeding          # Severe Malnutrition: based on nutrition assessment        Disposition Plan      Expected Discharge Date: 04/19/2023, 12:00 PM  Discharge Delays: Placement - LTC  Destination: inpatient rehabilitation facility  Discharge Comments: Dialysis pt MWF. Will need placement TCU/LTC. [facility] willing to accept once no sitter, SW to follow up when sitter free          Pako Tan MD  Hospitalist Service  [facility]  Securely message with [employer] (more info)  Text page via [facility] Paging/Directory   ______________________________________________________________________    Interval History     No new complaints.  Doing well  Dialysis today  Physical Exam   /65 (BP Location: Right arm)   Pulse 67   Temp 97.3  F (36.3  C) (Oral)   Resp 18   Wt 80.8 kg (178 lb 2.1 oz)   SpO2  98%   BMI 24.16 kg/m    Gen- pleasant   Neck- supple  CVS- I+II+ no m/r/g  RS- CTAB  Abdo- soft, no tenderness . No g/r/r  Ext- no edema     Medical Decision Making       36 MINUTES SPENT BY ME on the date of service doing chart review, history, exam, documentation & further activities per the note.      Data   ------------------------- PAST 24 HR DATA REVIEWED -----------------------------------------------    I have personally reviewed the following data over the past 24 hrs:    3.6 (L)  \   8.9 (L)   / 109 (L)     132 (L) 94 (L) 79.1 (H) /  96   5.0 25 5.40 (H) \       ALT: N/A AST: N/A AP: N/A TBILI: N/A   ALB: 3.5 TOT PROTEIN: N/A LIPASE: N/A       Ferritin:  286 % Retic:  N/A LDH:  N/A

## 2023-04-14 NOTE — PLAN OF CARE
DATE & TIME: 4/13/23 8953-5780  Cognitive Concerns/ Orientation : Pt A/Ox3, disoriented to situation at times.   BEHAVIOR & AGGRESSION TOOL COLOR: Green   ABNL VS/O2: VSS on RA  MOBILITY: Assist x1 with walker and gait belt. Ambulating in room- states he's too tired to walk in hallway or get up to chair for dinner.   PAIN MANAGMENT: c/o back pain- PRN tylenol given x1 (refused heat or ice packs)  DIET: Regular, mildly thick. Fair appetite this shift  BOWEL/BLADDER: Continent. Two BM's this shift- held bedtime lactulose  ABNL LAB/BG: Cr  4.63, GFR 14  DRAIN/DEVICES: R chest HD CVC, PEG tube clamped; L arm fistula  SKIN: Scattered bruising, scabbing, dry/scaly skin  D/C DATE: Discharge to TCU pending placement.  OTHER IMPORTANT INFO: Family requesting early breakfast before dialysis tomorrow and PRN tylenol to be given an hour or two before dialysis. PRN melatonin given this shift.

## 2023-04-14 NOTE — PROGRESS NOTES
Reason for admission: transfer from [facility] for evaluation of  worsening pleural effusion.  Mental Status: x3  Activity: AX1 GBW  Diet: Regular diet w/ mildly thick liquid  Pain: Controlled  Urination: ESRD, on Hemodialysis  Tele/Restraints/Iso: NA  LDA: CVC under R clavicle for HD. AVF on R arm. G tube in place  Expected D/C Date: Pending placement   Other Info:  BP soft after dialysis today which now improved after receiving scheduled midodrine. Hx of liver transplant 2016 due to alcohol abuse now found to have cirrhosis of transplanted liver, pAF on chronic anticoagulation, diabetes mellitus type 2, CVA, hypertension, CHRISTIAN on BiPAP.  In 11/2022 he had respiratory arrest and was diagnosed with parainfluenza and strep pneumoniae and acute on chronic renal insufficiency, which ended with ESRD.

## 2023-04-14 NOTE — PROGRESS NOTES
Potassium   Date Value Ref Range Status   04/14/2023 5.0 3.4 - 5.3 mmol/L Final   12/29/2022 3.4 3.4 - 5.3 mmol/L Final   04/20/2020 3.9 3.4 - 5.3 mmol/L Final     Potassium POCT   Date Value Ref Range Status   01/19/2023 3.6 3.5 - 5.0 mmol/L Final     Hemoglobin   Date Value Ref Range Status   04/14/2023 8.9 (L) 13.3 - 17.7 g/dL Final   04/20/2020 14.3 13.3 - 17.7 g/dL Final     Creatinine   Date Value Ref Range Status   04/14/2023 5.40 (H) 0.67 - 1.17 mg/dL Final   04/20/2020 1.06 0.66 - 1.25 mg/dL Final     Urea Nitrogen   Date Value Ref Range Status   04/14/2023 79.1 (H) 6.0 - 20.0 mg/dL Final   12/29/2022 46 (H) 7 - 30 mg/dL Final   04/20/2020 23 7 - 30 mg/dL Final     Sodium   Date Value Ref Range Status   04/14/2023 132 (L) 136 - 145 mmol/L Final   04/20/2020 139 133 - 144 mmol/L Final     INR   Date Value Ref Range Status   12/21/2022 1.36 (H) 0.85 - 1.15 Final   10/07/2019 1.20 (H) 0.86 - 1.14 Final       DIALYSIS PROCEDURE NOTE  Hepatitis status of previous patient on machine log was checked and verified ok to use with this patients hepatitis status.  Patient dialyzed for 3 hrs. on a K3 bath for 2 hours then K2 bath for an hour with result back from this am with a net fluid removal of  1.5L.  A BFR of 300 ml/min was obtained via a RIJ catheter.   The treatment plan was discussed with Dr. Montaño during the treatment.    Total heparin received during the treatment: 0 units.   Line flushed, clamped and capped with heparin 1:1000 1.9 mL (1900 units) per lumen    Meds  given: EPO 10,000units IV   Complications: Hypotension supported with NS 200ml IV and Arterial collapse with bloodlines reversed and pt facing Left side. Dr Swartz notified. Midodrine 10mg PO given and BP stable.     Person educated: pt. Knowledge base none. Barriers to learning: none. Educated on access care via verbal mode. Patient verbalized understanding. Pt prefers verbal education style.     ICEBOAT? Timeout performed pre-treatment  I:  Patient was identified using 2 identifiers  C:  Consent Signed Yes  E: Equipment preventative maintenance is current and dialysis delivery system OK to use  B: Hepatitis B Surface Antigen: neg; Draw Date: 3/29/23      Hepatitis B Surface Antibody: succept; Draw Date: 3/29/23  O: Dialysis orders present and complete prior to treatment  A: Vascular access verified and assessed prior to treatment  T: Treatment was performed at a clinically appropriate time  ?: Patient was allowed to ask questions and address concerns prior to treatment  See Adult Hemodialysis flowsheet in [facility] for further details and post assessment.  Machine water alarm in place and functioning. Transducer pods intact and checked every 15min.   Pt returned via bed.  Chlorine/Chloramine water system checked every 4 hours.  Outpatient Dialysis at [facility]    Patient repositioned every 2 hours during the treatment.  Post treatment report given to SCOTT Michael RN regarding 1.5L of fluid removed, last BP of 104/74, and patient pain rating of 5/10.

## 2023-04-14 NOTE — PLAN OF CARE
DATE & TIME: 4/13/23 2420-5736  Cognitive Concerns/ Orientation : Pt A/Ox3, disoriented to situation at times.   BEHAVIOR & AGGRESSION TOOL COLOR: Green   ABNL VS/O2: VSS on RA  MOBILITY: Assist x1 with walker and gait belt. Ambulating in room- states he's too tired to walk in hallway or get up to chair for dinner.   PAIN MANAGMENT: c/o back pain- PRN tylenol given x1 (refused heat or ice packs)  DIET: Regular, mildly thick. Fair appetite this shift  BOWEL/BLADDER: Continent. Two BM's this shift- held bedtime lactulose  ABNL LAB/BG: Cr  4.63, GFR 14  DRAIN/DEVICES: R chest HD CVC, PEG tube clamped; L arm fistula  SKIN: Scattered bruising, scabbing, dry/scaly skin  D/C DATE: Discharge to TCU pending placement.  OTHER IMPORTANT INFO: Family requesting early breakfast before dialysis tomorrow and PRN tylenol to be given an hour or two before dialysis. PRN melatonin given this shift.

## 2023-04-14 NOTE — PLAN OF CARE
Goal Outcome Evaluation:    DATE & TIME: 4/14/23 7660-2686   Cognitive Concerns/ Orientation : Pt A/Ox3, disoriented to situation. Confused.   BEHAVIOR & AGGRESSION TOOL COLOR: Green   ABNL VS/O2: Soft BP's during dialysis. Scheduled and PRN midodrine given. BP's stabalized.  MOBILITY: Assist x1 with gait belt and walker, fall risk.   PAIN MANAGMENT: Complaints of back pain, managed with PRN Tylenol and heat packs.  DIET: Regular, mildly thick. Good appetite  BOWEL/BLADDER: Continent. 2 loose BM's this shift.  ABNL LAB/BG: Cr 5.40 /Ca 10.3  DRAIN/DEVICES: R chest HD CVC, PEG tube clamped, L arm fistula  SKIN: Scattered bruising, scabbing, dry/scaly skin  D/C DATE: Discharge to TCU pending placement  OTHER IMPORTANT INFO: 1.5 L removed during dialysis

## 2023-04-15 ENCOUNTER — APPOINTMENT (OUTPATIENT)
Dept: PHYSICAL THERAPY | Facility: CLINIC | Age: 58
DRG: 981 | End: 2023-04-15
Attending: STUDENT IN AN ORGANIZED HEALTH CARE EDUCATION/TRAINING PROGRAM
Payer: COMMERCIAL

## 2023-04-15 LAB
ALBUMIN SERPL BCG-MCNC: 3.7 G/DL (ref 3.5–5.2)
ANION GAP SERPL CALCULATED.3IONS-SCNC: 12 MMOL/L (ref 7–15)
BUN SERPL-MCNC: 66.6 MG/DL (ref 6–20)
CALCIUM SERPL-MCNC: 10 MG/DL (ref 8.6–10)
CHLORIDE SERPL-SCNC: 94 MMOL/L (ref 98–107)
CREAT SERPL-MCNC: 4.89 MG/DL (ref 0.67–1.17)
DEPRECATED HCO3 PLAS-SCNC: 27 MMOL/L (ref 22–29)
GFR SERPL CREATININE-BSD FRML MDRD: 13 ML/MIN/1.73M2
GLUCOSE SERPL-MCNC: 117 MG/DL (ref 70–99)
MAGNESIUM SERPL-MCNC: 1.9 MG/DL (ref 1.7–2.3)
PHOSPHATE SERPL-MCNC: 4.8 MG/DL (ref 2.5–4.5)
POTASSIUM SERPL-SCNC: 4.2 MMOL/L (ref 3.4–5.3)
SODIUM SERPL-SCNC: 133 MMOL/L (ref 136–145)

## 2023-04-15 PROCEDURE — 250N000013 HC RX MED GY IP 250 OP 250 PS 637: Performed by: HOSPITALIST

## 2023-04-15 PROCEDURE — 250N000013 HC RX MED GY IP 250 OP 250 PS 637: Performed by: STUDENT IN AN ORGANIZED HEALTH CARE EDUCATION/TRAINING PROGRAM

## 2023-04-15 PROCEDURE — 36415 COLL VENOUS BLD VENIPUNCTURE: CPT | Performed by: STUDENT IN AN ORGANIZED HEALTH CARE EDUCATION/TRAINING PROGRAM

## 2023-04-15 PROCEDURE — 250N000013 HC RX MED GY IP 250 OP 250 PS 637: Performed by: PSYCHIATRY & NEUROLOGY

## 2023-04-15 PROCEDURE — 99232 SBSQ HOSP IP/OBS MODERATE 35: CPT | Performed by: INTERNAL MEDICINE

## 2023-04-15 PROCEDURE — 97110 THERAPEUTIC EXERCISES: CPT | Mod: GP | Performed by: PHYSICAL THERAPIST

## 2023-04-15 PROCEDURE — 120N000001 HC R&B MED SURG/OB

## 2023-04-15 PROCEDURE — 97116 GAIT TRAINING THERAPY: CPT | Mod: GP | Performed by: PHYSICAL THERAPIST

## 2023-04-15 PROCEDURE — 250N000012 HC RX MED GY IP 250 OP 636 PS 637: Performed by: STUDENT IN AN ORGANIZED HEALTH CARE EDUCATION/TRAINING PROGRAM

## 2023-04-15 PROCEDURE — 80069 RENAL FUNCTION PANEL: CPT | Performed by: STUDENT IN AN ORGANIZED HEALTH CARE EDUCATION/TRAINING PROGRAM

## 2023-04-15 PROCEDURE — 83735 ASSAY OF MAGNESIUM: CPT | Performed by: STUDENT IN AN ORGANIZED HEALTH CARE EDUCATION/TRAINING PROGRAM

## 2023-04-15 RX ADMIN — SEVELAMER CARBONATE 800 MG: 800 TABLET, FILM COATED ORAL at 12:48

## 2023-04-15 RX ADMIN — SEVELAMER CARBONATE 800 MG: 800 TABLET, FILM COATED ORAL at 17:54

## 2023-04-15 RX ADMIN — GUAIFENESIN 600 MG: 600 TABLET, EXTENDED RELEASE ORAL at 08:05

## 2023-04-15 RX ADMIN — MELATONIN 5 MG TABLET 10 MG: at 22:48

## 2023-04-15 RX ADMIN — TACROLIMUS 1 MG: 1 CAPSULE ORAL at 08:05

## 2023-04-15 RX ADMIN — LACTULOSE 10 G: 10 SOLUTION ORAL at 08:05

## 2023-04-15 RX ADMIN — LEVETIRACETAM 1000 MG: 500 TABLET, FILM COATED ORAL at 21:22

## 2023-04-15 RX ADMIN — MIDODRINE HYDROCHLORIDE 10 MG: 5 TABLET ORAL at 08:05

## 2023-04-15 RX ADMIN — Medication 1 CAPSULE: at 08:04

## 2023-04-15 RX ADMIN — APIXABAN 5 MG: 5 TABLET, FILM COATED ORAL at 19:18

## 2023-04-15 RX ADMIN — MIDODRINE HYDROCHLORIDE 10 MG: 5 TABLET ORAL at 12:48

## 2023-04-15 RX ADMIN — ACETAMINOPHEN 650 MG: 325 TABLET, FILM COATED ORAL at 11:01

## 2023-04-15 RX ADMIN — FOLIC ACID 1 MG: 1 TABLET ORAL at 08:05

## 2023-04-15 RX ADMIN — SEVELAMER CARBONATE 800 MG: 800 TABLET, FILM COATED ORAL at 08:05

## 2023-04-15 RX ADMIN — ROPINIROLE HYDROCHLORIDE 0.5 MG: 0.25 TABLET, FILM COATED ORAL at 21:22

## 2023-04-15 RX ADMIN — PANTOPRAZOLE SODIUM 40 MG: 40 TABLET, DELAYED RELEASE ORAL at 08:05

## 2023-04-15 RX ADMIN — LACOSAMIDE 100 MG: 100 TABLET, FILM COATED ORAL at 12:48

## 2023-04-15 RX ADMIN — RAMELTEON 8 MG: 8 TABLET, FILM COATED ORAL at 19:18

## 2023-04-15 RX ADMIN — MIDODRINE HYDROCHLORIDE 10 MG: 5 TABLET ORAL at 17:53

## 2023-04-15 RX ADMIN — TACROLIMUS 1 MG: 1 CAPSULE ORAL at 19:18

## 2023-04-15 RX ADMIN — ACETAMINOPHEN 650 MG: 325 TABLET, FILM COATED ORAL at 19:17

## 2023-04-15 RX ADMIN — LACOSAMIDE 100 MG: 100 TABLET, FILM COATED ORAL at 01:13

## 2023-04-15 RX ADMIN — OLANZAPINE 5 MG: 5 TABLET, ORALLY DISINTEGRATING ORAL at 21:22

## 2023-04-15 RX ADMIN — APIXABAN 5 MG: 5 TABLET, FILM COATED ORAL at 08:04

## 2023-04-15 RX ADMIN — RIFAXIMIN 550 MG: 550 TABLET ORAL at 08:05

## 2023-04-15 RX ADMIN — RIFAXIMIN 550 MG: 550 TABLET ORAL at 19:19

## 2023-04-15 ASSESSMENT — ACTIVITIES OF DAILY LIVING (ADL)
ADLS_ACUITY_SCORE: 49

## 2023-04-15 NOTE — PROGRESS NOTES
[facility]    Medicine Progress Note - Hospitalist Service    Date of Admission:  1/21/2023    Assessment & Plan   Blanco Osborne is a 57 year-old male admitted on 1/21/2023 as a transfer from [facility] for evaluation of loculated pleural effusion. He has extensive PMH including h/o liver transplant 2016 due to alcohol abuse, pAF on chronic anticoagulation, history of diabetes mellitus type 2, CVA, hypertension, CHRISTIAN on BiPAP.  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 He was recovering in [facility] and was transferred to [facility] for consideration of chest tube placement and possible intrapleural lysis for worsening effusion.     4/15: No change.  Await TCU.  Discussed continuation of current dose of Zyprexa versus decrease further.    Acute metabolic encephalopathy with delirium, multifactorial, -resolved  Hepatic encephalopathy  Chronic liver disease, history of liver transplant 2016  End-stage renal disease (ESRD), on hemodialysis (HD)  History of seizure, on Keppra and Vimpat  Waxing and waning mentation, coinciding with lactulose being held at [facility]  Earlier in hospital stay, remained confused and had pulled out tracheostomy tube, PICC line and pulled his dialysis catheter.  Lines replaced.  Psychiatry consulted, recent follow-up 2/21, 2/28, see notes.    Mental status continues to fluctuate with periods of alertness and increased sleepiness, overall improving.    Complicated, prolonged hospital course with mental status concerns multifactorial related to hepatic encephalopathy, end-stage renal disease on dialysis, past PEA arrest, seizure disorder, medication, and chronic liver disease with prior liver transplant.  As mental status fluctuates will continue to  require one-to-one sitter as needed for added safety and supervision.  Reassess daily.    Continue to reorient and redirect as able.  Avoid restraints if possible with goals of safety and close supervision (given multiple lines and tubes - dialysis catheter, PEG tube, IV, etc).    Maintain normal day/night cycles and sleep-wake cycles.  Minimize sedating medications as able.  Remains weak and deconditioned with complex, prolonged hospital course and limited mobility.  Encouragement and support offered daily to patient.    Continue Lactulose to 10 mg BID, monitor for symptoms; goal to have 2-3 bowel movements per day.    Ongoing hemodialysis, as per nephrology, 3X/week dialysis schedule.    Nephrology following    Continue ramelteon 8 mg at bedtime; monitor for side effects including AM sedation, reassess daily.    Started on olanzapine 5 mg BID and PRN earlier during this hospitalization. Delirium improved, discontinued AM dose of olanzapine on non-dialysis days.    Seizure disorder stable, continue Keppra and Vimpat; monitor, no recurrent seizures reported of recent.    Reconsult psychiatry as needed.    Neurology consult appreciated.  Patient has been started on ropinirole for restless leg syndrome.     Bilateral pleural effusions   Acute respiratory failure requiring tracheostomy  - resolved    Pneumonia  - resolved   ARDS  - resolved    Right pneumothorax - resolved   *Initial event was parainfluenza and strep pneumo pneumonia back in November 2022. Treated for ARDS. Had failed extubation x2 and tracheostomy performed on previous hospitalization. *Had additional complications of bilateral pneumothoraces as well as hydropneumothorax- pleural fluid grew candida parapsilosis  *Completed 4-week antifungal treatment. While in LTACH he was successfully weaned from the ventilator.  *Recently there were concern for worsening effusion while at LTACH and had thoracentesis 01/13 which returned exudative without growth on  cultures. CT chest/abdomen/pelvis on 01/18 demonstrated worsening effusions and concerns for infected parapneumonic effusions with possible SBP.  Multiple pulmonologist at [facility] reviewed CT scan recommended transfer to [facility] for consideration of chest tube placement and possible intrapleural lysis  *Thoracic surgery (Dr. Jackson) consulted during admission   *CT chest 1/22, small effusions on imaging and so chest tube was not inserted.   ID consulted, see note 2/10.  *Patient pulled out his tracheostomy tube on the night 2/1-2/2 and is tolerating well without increased shortness of breath persistent cough or worsening hypoxia.  * Chest x-ray 2/3 with cardiomegaly, chronic small bilateral pleural effusions, no pulmonary edema; right internal jugular dialysis catheter in place.   3/12 Pulmonary consult, see note, concerns of hypercapnia, atelectasis, with consideration of BiPAP trial; no recommendations for antibiotics with infiltrates felt to be atelectasis.    Stable, continue to monitor respiratory status, recently stable on room air; occasional cough reported.    Encourage incentive spirometry as able, up to chair, deep breathing.    Wound care previously consulted for tracheostomy site care, monitor for signs and symptoms of infection, healing well - resolved.    Encourage ambulation.     Possible splenic infract  Wedge shaped area on CT scan chest and CT abdomen on 3/7/23.    Hematology consulted, suspicion for infarct was low.    TTE no thrombus or vegetations.    No abdominal pain CTM.     S/p liver transplant 2016   Chronic immunosuppression, on tacrolimus  Cirrhosis with ascites  Subacute bacterial peritonitis (SBP), resolved  *Main manifestations of this are hepatic encephalopathy and ascites.  Hepatologist at [place] felt that most likely reason for the cirrhosis was metabolic syndrome or alcohol intake.  There was no evidence of rejection on biopsy and there was some evidence of  "regeneration. Paracentesis done on 12/22/2022 showed lactobacillus indicating SBP, treated with Unasyn and Augmentin, finished 2-week course 1/12/2023.  Requires large-volume paracentesis periodically for recurring ascites.    *Paracentesis 1/13/2023 yielded about 7500 cc. Cultures NGTD. paracentesis on 1/24 with 4.8 L fluid removal  Paracentesis on 3/6/23 No SBP    Continue intermittent paracentesis as needed if develops symptomatic ascites.    Monitor for signs and symptoms of recurrent spontaneous bacterial peritonitis.    Further treatment for recurrent ascites with TIPS deferred to his Liver Transplant team and/or Hepatology, he has an appointment on 4/21/2023 with Hepatology, Dr. Simms.    Continue Tacrolimus 1 mg BID, rifaximin 550 mg BID.    Continue lactulose as ordered, titrate as needed to have 2-3 bms.    GI consult as needed in hospital for new acute issues, otherwise as outpatient.    Encourgae high protein diet.    CT abdomen on March 9, 2023 showed small to moderate ascites.     Goals of care   As per prior rounding provider, \"on 1/25 nursing had mentioned that patient had refused to undergo dialysis and want to stop care, palliative care was consulted.  Patient and his spouse denied wanting to stop care and wanted ongoing restorative care including full code\"    Monitor, Full Code status.    Needs placement to TCU -> SW working on placement     Diarrhea, improved, on lactulose for chronic liver disease    C difficile negative on 1/31. Secondary to lactulose. Discontinued rectal tube (2/12) as stools more formed.    Continue lactulose with goal of 2 to 3 soft bowel movement in 24 hours.     Paroxysmal atrial fibrillation  Chronic anticoagulation, apixaban  History of embolic strokes  Remains in sinus rhythm, on anticoagulation with apixaban indefinitely for stroke prophylaxis.      Monitor rhythm.    Continue apixaban anticoagulation, it was briefly held for fistula placement.    Monitor hemoglobin, " see below.     Acute anemia  Patient has required intermittent transfusions for on-going anemia.  Anemia most likely secondary to critical illness/chronic disease, chronic renal disease.  Baseline hemoglobin around 7-8.  Likely Epo resistance.    Hemoglobin stable at 8.4 on 3/30/23.    Monitor intermittently.     Hypercalcemia  Josue hypercalcemia of Immobility    Nephrology following.    Hold VIT D.    Continue to hold Phoslo.    Monitor labs.    Encourage ambulation.    Nephrology considering prolia, however patient's wife would like to hold off for now.     History PEA arrest   PEA arrest prior to his previous admission and 1 episode of PEA associated with desaturation on 12/20.  No structural cardiac disease.     Stable, continue cardiac Monitoring.     Chronic Hypotension  In the past has developed baseline hypotension with cirrhosis and prolonged critical illness.  On hemodialysis.  Receiving midodrine and albumin during dialysis.    Continue midodrine, as per nephrology.      History of seizure   After hypoxic event, in the context of baseline multifactorial encephalopathy secondary to his ongoing critical illness and prior cardiac arrests and prior strokes and cirrhosis with hepatic encephalopathy. MRI of head of 12/20/22 reveals no PRES or leptomeningeal enhancement but scattered.  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   No recent seizure activity.    Continue levetiracetam, lacosamide.    Seizure precautions.    Outpatient follow-up with neurology, consult inpatient if needed.     ESRD  Anemia due to renal disease  Hypotension during dialysis  Hyperphosphatemia    As per nephrology.    Nephrology reviewing vein mapping to assess options for internal access placement for dialysis, see note 3/16.    Underwent Fistula placement on  3/21/22.    Vascular surgery following-continue to use for tunnel catheter.  Outpatient follow-up with vascular surgery  and for will need a follow-up ultrasound of the fistula in 6 weeks to assess patency.    Started sevalmer 800mg TID with meals on 3/27/23.    Complained of cramping with dialysis. Tried a dose of oxycodone prior to dialysis but this was not helpful. He will discuss the cramping with nephrology.     Diabetes mellitus type 2  *Hemoglobin A1c on November 2022 was 4.7  *By report, on Lantus insulin in the past.  Blood sugar checks and sliding scale insulin previously discontinued as has been stable without insulin needs.    Monitor with periodic blood sugar checks as needed.     Hypomagnesemia    Resolved with replacement.     Severe malnutrition, protein and calorie type  Moderate dysphagia  G-tube feedings    Continue with tube feeds via PEG tube.    IR replaced the PEG tube on 2/8/2023, monitor.    Nutritionist consulted and following for tube feeds.    SLP following as well. Oral diet as per speech and language therapy (SLP). Patient hopeful to advance to thin liquids soon.    2/20 Nutrition following for tube feeds.    Now undergoing PRN tube feeds.    Encourage high-protein diet.    Nutrition notes reviewed, patient eating 0 to 100% of meals.  Continue current interventions.  As needed tube feed boluses available if patient consumes less than 50% of the meal.    Family encouraging patient to eat high protein diet including protein bars    Family asking about g tube removal. Per the EMR, last tube feed bolus was 3/22 if adequately documented. He refuses his tube feeds often per the dietitician. At this point, would favor keeping it in place given overall poor prognosis with ESRD, cirrhosis of transplanted liver, persistent encephalopathy, and inadequate oral intake. Event if he refuses, he is at risk for developing new infection or other decompensation and thus g tube as safety net.      Anxiety  Fluoxetine was discontinued as per psychiatry recommendation on 2/2 (see consult note for details).    Stable, monitor;  comfort and reassurance offered during visit.    Psychiatry follow-up.     Restless leg  Syndrome    Patient started on ropiranole by neurology.       Diet: Snacks/Supplements Adult: Other; Gelatein+ with brkfst and lunch, and magic cup with dinner (RD); With Meals  Combination Diet Regular Diet; Mildly Thick (level 2) (OK for fresh fruit (e.g., pineapple) per SLP)  Adult Formula Bolus Feeding: Novasource Renal; Route: Gastrostomy; 3 times daily; Volume per Bolus: 240; mL(s); Back up bolus for when pt consumes <50% of meals: 80 mL/hr x 3 hrs  Room Service    DVT Prophylaxis: DOAC  Nixon Catheter: Not present  Lines: PRESENT      CVC Double Lumen Right External jugular Tunneled-Site Assessment: WDL  Hemodialysis Vascular Access Arteriovenous fistula Left Forearm-Site Assessment: WDL      Cardiac Monitoring: None  Code Status: Full Code      Clinically Significant Risk Factors           # Hypercalcemia: Highest Ca = 10.3 mg/dL in last 2 days, will monitor as appropriate    # Hypoalbuminemia: Lowest albumin = 1.9 g/dL at 1/23/2023  6:38 AM, will monitor as appropriate   # Thrombocytopenia: Lowest platelets = 109 in last 2 days, will monitor for bleeding          # Severe Malnutrition: based on nutrition assessment        Disposition Plan     Expected Discharge Date: 04/19/2023, 12:00 PM  Discharge Delays: Placement - LTC  Destination: inpatient rehabilitation facility  Discharge Comments: Dialysis pt MWF. Will need placement TCU/LTC. [facility] willing to accept once no sitter, SW to follow up when sitter free          Pako Tan MD  Hospitalist Service  [facility]  Securely message with Matthieu (more info)  Text page via [facility] Paging/Directory   ______________________________________________________________________    Interval History     No new complaints.  Doing well    Physical Exam   /77 (BP Location: Right arm)   Pulse 80   Temp 97.9  F (36.6  C) (Oral)   Resp 18   Wt 80.8 kg  (178 lb 2.1 oz)   SpO2 93%   BMI 24.16 kg/m    Gen- pleasant   Neck- supple  CVS- I+II+ no m/r/g  RS- CTAB  Abdo- soft, no tenderness . No g/r/r    Medical Decision Making       36 MINUTES SPENT BY ME on the date of service doing chart review, history, exam, documentation & further activities per the note.      Data   ------------------------- PAST 24 HR DATA REVIEWED -----------------------------------------------    I have personally reviewed the following data over the past 24 hrs:    Ferritin:  N/A % Retic:  N/A LDH:  N/A

## 2023-04-15 NOTE — PLAN OF CARE
Goal Outcome Evaluation:    DATE & TIME: 4/15/23 [id number]   Cognitive Concerns/ Orientation : Pt A/Ox3, disoriented to situation. Confused.   BEHAVIOR & AGGRESSION TOOL COLOR: Green   ABNL VS/O2: VSS on RA  MOBILITY: Assist x1 with gait belt and walker, fall risk.   PAIN MANAGMENT: PRN tylenol given for lower back pain.   DIET: Regular, mildly thick. Good appetite  BOWEL/BLADDER: Continent.  loose BM's this shift  ABNL LAB/BG: Cr 5.40 /Ca 10.3  DRAIN/DEVICES: R chest HD CVC, PEG tube clamped, L arm fistula  SKIN: Scattered bruising, scabbing, dry/scaly skin  D/C DATE: Discharge to TCU pending placement  OTHER IMPORTANT INFO:

## 2023-04-15 NOTE — PLAN OF CARE
Goal Outcome Evaluation:  Alert and oriented x2-confused. Jumps out of bed at times. Assist of 1 gbw.  Incontinent at times-on HD MWF. HD CVC in place. AVF on L arm. PEG tube clamped. PRN tylenol given for back pain and mild headache.

## 2023-04-16 LAB
ALBUMIN SERPL BCG-MCNC: 3.7 G/DL (ref 3.5–5.2)
ANION GAP SERPL CALCULATED.3IONS-SCNC: 15 MMOL/L (ref 7–15)
BUN SERPL-MCNC: 78.6 MG/DL (ref 6–20)
CALCIUM SERPL-MCNC: 10.5 MG/DL (ref 8.6–10)
CHLORIDE SERPL-SCNC: 94 MMOL/L (ref 98–107)
CREAT SERPL-MCNC: 5.55 MG/DL (ref 0.67–1.17)
DEPRECATED HCO3 PLAS-SCNC: 25 MMOL/L (ref 22–29)
GFR SERPL CREATININE-BSD FRML MDRD: 11 ML/MIN/1.73M2
GLUCOSE BLDC GLUCOMTR-MCNC: 94 MG/DL (ref 70–99)
GLUCOSE SERPL-MCNC: 125 MG/DL (ref 70–99)
MAGNESIUM SERPL-MCNC: 2 MG/DL (ref 1.7–2.3)
PHOSPHATE SERPL-MCNC: 5.9 MG/DL (ref 2.5–4.5)
POTASSIUM SERPL-SCNC: 4.3 MMOL/L (ref 3.4–5.3)
SODIUM SERPL-SCNC: 134 MMOL/L (ref 136–145)

## 2023-04-16 PROCEDURE — 250N000013 HC RX MED GY IP 250 OP 250 PS 637: Performed by: STUDENT IN AN ORGANIZED HEALTH CARE EDUCATION/TRAINING PROGRAM

## 2023-04-16 PROCEDURE — 250N000012 HC RX MED GY IP 250 OP 636 PS 637: Performed by: STUDENT IN AN ORGANIZED HEALTH CARE EDUCATION/TRAINING PROGRAM

## 2023-04-16 PROCEDURE — 36415 COLL VENOUS BLD VENIPUNCTURE: CPT | Performed by: STUDENT IN AN ORGANIZED HEALTH CARE EDUCATION/TRAINING PROGRAM

## 2023-04-16 PROCEDURE — 120N000001 HC R&B MED SURG/OB

## 2023-04-16 PROCEDURE — 80069 RENAL FUNCTION PANEL: CPT | Performed by: STUDENT IN AN ORGANIZED HEALTH CARE EDUCATION/TRAINING PROGRAM

## 2023-04-16 PROCEDURE — 250N000013 HC RX MED GY IP 250 OP 250 PS 637: Performed by: HOSPITALIST

## 2023-04-16 PROCEDURE — 99232 SBSQ HOSP IP/OBS MODERATE 35: CPT | Performed by: INTERNAL MEDICINE

## 2023-04-16 PROCEDURE — 83735 ASSAY OF MAGNESIUM: CPT | Performed by: INTERNAL MEDICINE

## 2023-04-16 PROCEDURE — 250N000013 HC RX MED GY IP 250 OP 250 PS 637: Performed by: PSYCHIATRY & NEUROLOGY

## 2023-04-16 RX ADMIN — ACETAMINOPHEN 650 MG: 325 TABLET, FILM COATED ORAL at 14:41

## 2023-04-16 RX ADMIN — OLANZAPINE 5 MG: 5 TABLET, ORALLY DISINTEGRATING ORAL at 22:10

## 2023-04-16 RX ADMIN — RAMELTEON 8 MG: 8 TABLET, FILM COATED ORAL at 20:40

## 2023-04-16 RX ADMIN — SEVELAMER CARBONATE 800 MG: 800 TABLET, FILM COATED ORAL at 08:20

## 2023-04-16 RX ADMIN — PANTOPRAZOLE SODIUM 40 MG: 40 TABLET, DELAYED RELEASE ORAL at 08:21

## 2023-04-16 RX ADMIN — SEVELAMER CARBONATE 800 MG: 800 TABLET, FILM COATED ORAL at 12:13

## 2023-04-16 RX ADMIN — LACOSAMIDE 100 MG: 100 TABLET, FILM COATED ORAL at 12:13

## 2023-04-16 RX ADMIN — RIFAXIMIN 550 MG: 550 TABLET ORAL at 08:20

## 2023-04-16 RX ADMIN — Medication 1 CAPSULE: at 08:20

## 2023-04-16 RX ADMIN — APIXABAN 5 MG: 5 TABLET, FILM COATED ORAL at 20:40

## 2023-04-16 RX ADMIN — MIDODRINE HYDROCHLORIDE 10 MG: 5 TABLET ORAL at 18:12

## 2023-04-16 RX ADMIN — ACETAMINOPHEN 650 MG: 325 TABLET, FILM COATED ORAL at 00:29

## 2023-04-16 RX ADMIN — LACTULOSE 10 G: 10 SOLUTION ORAL at 08:19

## 2023-04-16 RX ADMIN — SEVELAMER CARBONATE 800 MG: 800 TABLET, FILM COATED ORAL at 18:12

## 2023-04-16 RX ADMIN — MIDODRINE HYDROCHLORIDE 10 MG: 5 TABLET ORAL at 12:13

## 2023-04-16 RX ADMIN — TACROLIMUS 1 MG: 1 CAPSULE ORAL at 08:20

## 2023-04-16 RX ADMIN — RIFAXIMIN 550 MG: 550 TABLET ORAL at 20:40

## 2023-04-16 RX ADMIN — LEVETIRACETAM 1000 MG: 500 TABLET, FILM COATED ORAL at 22:10

## 2023-04-16 RX ADMIN — ROPINIROLE HYDROCHLORIDE 0.5 MG: 0.25 TABLET, FILM COATED ORAL at 22:10

## 2023-04-16 RX ADMIN — TACROLIMUS 1 MG: 1 CAPSULE ORAL at 20:40

## 2023-04-16 RX ADMIN — MIDODRINE HYDROCHLORIDE 10 MG: 5 TABLET ORAL at 08:20

## 2023-04-16 RX ADMIN — APIXABAN 5 MG: 5 TABLET, FILM COATED ORAL at 08:21

## 2023-04-16 RX ADMIN — LACOSAMIDE 100 MG: 100 TABLET, FILM COATED ORAL at 00:26

## 2023-04-16 RX ADMIN — FOLIC ACID 1 MG: 1 TABLET ORAL at 08:20

## 2023-04-16 ASSESSMENT — ACTIVITIES OF DAILY LIVING (ADL)
ADLS_ACUITY_SCORE: 46
ADLS_ACUITY_SCORE: 47
ADLS_ACUITY_SCORE: 47
ADLS_ACUITY_SCORE: 46
ADLS_ACUITY_SCORE: 47
ADLS_ACUITY_SCORE: 46
ADLS_ACUITY_SCORE: 47
ADLS_ACUITY_SCORE: 46
ADLS_ACUITY_SCORE: 47
ADLS_ACUITY_SCORE: 46

## 2023-04-16 NOTE — PROGRESS NOTES
[facility]    Medicine Progress Note - Hospitalist Service    Date of Admission:  1/21/2023    Assessment & Plan   Blanco Osborne is a 57 year-old male admitted on 1/21/2023 as a transfer from [facility] for evaluation of loculated pleural effusion. He has extensive PMH including h/o liver transplant 2016 due to alcohol abuse, pAF on chronic anticoagulation, history of diabetes mellitus type 2, CVA, hypertension, CHRISTIAN on BiPAP.  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 He was recovering in [facility] and was transferred to [facility] for consideration of chest tube placement and possible intrapleural lysis for worsening effusion.     4/16: No change.  Await TCU.  Discussed continuation of current dose of Zyprexa versus decrease further.  Placed psychiatry consult for Monday  *May have updated plan from /care coordinator for disposition to home later during the week.    Acute metabolic encephalopathy with delirium, multifactorial, -resolved  Hepatic encephalopathy  Chronic liver disease, history of liver transplant 2016  End-stage renal disease (ESRD), on hemodialysis (HD)  History of seizure, on Keppra and Vimpat  Waxing and waning mentation, coinciding with lactulose being held at [facility]  Earlier in hospital stay, remained confused and had pulled out tracheostomy tube, PICC line and pulled his dialysis catheter.  Lines replaced.  Psychiatry consulted, recent follow-up 2/21, 2/28, see notes.    Mental status continues to fluctuate with periods of alertness and increased sleepiness, overall improving.    Complicated, prolonged hospital course with mental status concerns multifactorial related to hepatic encephalopathy, end-stage renal disease on dialysis, past PEA arrest,  seizure disorder, medication, and chronic liver disease with prior liver transplant.  As mental status fluctuates will continue to require one-to-one sitter as needed for added safety and supervision.  Reassess daily.    Continue to reorient and redirect as able.  Avoid restraints if possible with goals of safety and close supervision (given multiple lines and tubes - dialysis catheter, PEG tube, IV, etc).    Maintain normal day/night cycles and sleep-wake cycles.  Minimize sedating medications as able.  Remains weak and deconditioned with complex, prolonged hospital course and limited mobility.  Encouragement and support offered daily to patient.    Continue Lactulose to 10 mg BID, monitor for symptoms; goal to have 2-3 bowel movements per day.    Ongoing hemodialysis, as per nephrology, 3X/week dialysis schedule.    Nephrology following    Continue ramelteon 8 mg at bedtime; monitor for side effects including AM sedation, reassess daily.    Started on olanzapine 5 mg BID and PRN earlier during this hospitalization. Delirium improved, discontinued AM dose of olanzapine on non-dialysis days.    Seizure disorder stable, continue Keppra and Vimpat; monitor, no recurrent seizures reported of recent.    Reconsult psychiatry as needed.    Neurology consult appreciated.  Patient has been started on ropinirole for restless leg syndrome.     Bilateral pleural effusions   Acute respiratory failure requiring tracheostomy  - resolved    Pneumonia  - resolved   ARDS  - resolved    Right pneumothorax - resolved   *Initial event was parainfluenza and strep pneumo pneumonia back in November 2022. Treated for ARDS. Had failed extubation x2 and tracheostomy performed on previous hospitalization. *Had additional complications of bilateral pneumothoraces as well as hydropneumothorax- pleural fluid grew candida parapsilosis  *Completed 4-week antifungal treatment. While in LTACH he was successfully weaned from the  ventilator.  *Recently there were concern for worsening effusion while at [facility] and had thoracentesis 01/13 which returned exudative without growth on cultures. CT chest/abdomen/pelvis on 01/18 demonstrated worsening effusions and concerns for infected parapneumonic effusions with possible SBP.  Multiple pulmonologist at [facility] reviewed CT scan recommended transfer to [facility] for consideration of chest tube placement and possible intrapleural lysis  *Thoracic surgery (Dr. Jackson) consulted during admission   *CT chest 1/22, small effusions on imaging and so chest tube was not inserted.   ID consulted, see note 2/10.  *Patient pulled out his tracheostomy tube on the night 2/1-2/2 and is tolerating well without increased shortness of breath persistent cough or worsening hypoxia.  * Chest x-ray 2/3 with cardiomegaly, chronic small bilateral pleural effusions, no pulmonary edema; right internal jugular dialysis catheter in place.   3/12 Pulmonary consult, see note, concerns of hypercapnia, atelectasis, with consideration of BiPAP trial; no recommendations for antibiotics with infiltrates felt to be atelectasis.    Stable, continue to monitor respiratory status, recently stable on room air; occasional cough reported.    Encourage incentive spirometry as able, up to chair, deep breathing.    Wound care previously consulted for tracheostomy site care, monitor for signs and symptoms of infection, healing well - resolved.    Encourage ambulation.     Possible splenic infract  Wedge shaped area on CT scan chest and CT abdomen on 3/7/23.    Hematology consulted, suspicion for infarct was low.    TTE no thrombus or vegetations.    No abdominal pain CTM.     S/p liver transplant 2016   Chronic immunosuppression, on tacrolimus  Cirrhosis with ascites  Subacute bacterial peritonitis (SBP), resolved  *Main manifestations of this are hepatic encephalopathy and ascites.  Hepatologist at [place] felt that most likely reason for  "the cirrhosis was metabolic syndrome or alcohol intake.  There was no evidence of rejection on biopsy and there was some evidence of regeneration. Paracentesis done on 12/22/2022 showed lactobacillus indicating SBP, treated with Unasyn and Augmentin, finished 2-week course 1/12/2023.  Requires large-volume paracentesis periodically for recurring ascites.    *Paracentesis 1/13/2023 yielded about 7500 cc. Cultures NGTD. paracentesis on 1/24 with 4.8 L fluid removal  Paracentesis on 3/6/23 No SBP    Continue intermittent paracentesis as needed if develops symptomatic ascites.    Monitor for signs and symptoms of recurrent spontaneous bacterial peritonitis.    Further treatment for recurrent ascites with TIPS deferred to his Liver Transplant team and/or Hepatology, he has an appointment on 4/21/2023 with Hepatology, Dr. Simms.    Continue Tacrolimus 1 mg BID, rifaximin 550 mg BID.    Continue lactulose as ordered, titrate as needed to have 2-3 bms.    GI consult as needed in hospital for new acute issues, otherwise as outpatient.    Encourgae high protein diet.    CT abdomen on March 9, 2023 showed small to moderate ascites.     Goals of care   As per prior rounding provider, \"on 1/25 nursing had mentioned that patient had refused to undergo dialysis and want to stop care, palliative care was consulted.  Patient and his spouse denied wanting to stop care and wanted ongoing restorative care including full code\"    Monitor, Full Code status.    Needs placement to TCU -> SW working on placement     Diarrhea, improved, on lactulose for chronic liver disease    C difficile negative on 1/31. Secondary to lactulose. Discontinued rectal tube (2/12) as stools more formed.    Continue lactulose with goal of 2 to 3 soft bowel movement in 24 hours.     Paroxysmal atrial fibrillation  Chronic anticoagulation, apixaban  History of embolic strokes  Remains in sinus rhythm, on anticoagulation with apixaban indefinitely for stroke " prophylaxis.      Monitor rhythm.    Continue apixaban anticoagulation, it was briefly held for fistula placement.    Monitor hemoglobin, see below.     Acute anemia  Patient has required intermittent transfusions for on-going anemia.  Anemia most likely secondary to critical illness/chronic disease, chronic renal disease.  Baseline hemoglobin around 7-8.  Likely Epo resistance.    Hemoglobin stable at 8.4 on 3/30/23.    Monitor intermittently.     Hypercalcemia  Josue hypercalcemia of Immobility    Nephrology following.    Hold VIT D.    Continue to hold Phoslo.    Monitor labs.    Encourage ambulation.    Nephrology considering prolia, however patient's wife would like to hold off for now.     History PEA arrest   PEA arrest prior to his previous admission and 1 episode of PEA associated with desaturation on 12/20.  No structural cardiac disease.     Stable, continue cardiac Monitoring.     Chronic Hypotension  In the past has developed baseline hypotension with cirrhosis and prolonged critical illness.  On hemodialysis.  Receiving midodrine and albumin during dialysis.    Continue midodrine, as per nephrology.      History of seizure   After hypoxic event, in the context of baseline multifactorial encephalopathy secondary to his ongoing critical illness and prior cardiac arrests and prior strokes and cirrhosis with hepatic encephalopathy. MRI of head of 12/20/22 reveals no PRES or leptomeningeal enhancement but scattered.  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   No recent seizure activity.    Continue levetiracetam, lacosamide.    Seizure precautions.    Outpatient follow-up with neurology, consult inpatient if needed.     ESRD  Anemia due to renal disease  Hypotension during dialysis  Hyperphosphatemia    As per nephrology.    Nephrology reviewing vein mapping to assess options for internal access placement for dialysis, see note 3/16.    Underwent  Fistula placement on  3/21/22.    Vascular surgery following-continue to use for tunnel catheter.  Outpatient follow-up with vascular surgery and for will need a follow-up ultrasound of the fistula in 6 weeks to assess patency.    Started sevalmer 800mg TID with meals on 3/27/23.    Complained of cramping with dialysis. Tried a dose of oxycodone prior to dialysis but this was not helpful. He will discuss the cramping with nephrology.     Diabetes mellitus type 2  *Hemoglobin A1c on November 2022 was 4.7  *By report, on Lantus insulin in the past.  Blood sugar checks and sliding scale insulin previously discontinued as has been stable without insulin needs.    Monitor with periodic blood sugar checks as needed.     Hypomagnesemia    Resolved with replacement.     Severe malnutrition, protein and calorie type  Moderate dysphagia  G-tube feedings    Continue with tube feeds via PEG tube.    IR replaced the PEG tube on 2/8/2023, monitor.    Nutritionist consulted and following for tube feeds.    SLP following as well. Oral diet as per speech and language therapy (SLP). Patient hopeful to advance to thin liquids soon.    2/20 Nutrition following for tube feeds.    Now undergoing PRN tube feeds.    Encourage high-protein diet.    Nutrition notes reviewed, patient eating 0 to 100% of meals.  Continue current interventions.  As needed tube feed boluses available if patient consumes less than 50% of the meal.    Family encouraging patient to eat high protein diet including protein bars    Family asking about g tube removal. Per the EMR, last tube feed bolus was 3/22 if adequately documented. He refuses his tube feeds often per the dietitician. At this point, would favor keeping it in place given overall poor prognosis with ESRD, cirrhosis of transplanted liver, persistent encephalopathy, and inadequate oral intake. Event if he refuses, he is at risk for developing new infection or other decompensation and thus g tube as  safety net.      Anxiety  Fluoxetine was discontinued as per psychiatry recommendation on 2/2 (see consult note for details).    Stable, monitor; comfort and reassurance offered during visit.    Psychiatry follow-up.     Restless leg  Syndrome    Patient started on ropiranole by neurology.       Diet: Snacks/Supplements Adult: Other; Gelatein+ with brkfst and lunch, and magic cup with dinner (RD); With Meals  Combination Diet Regular Diet; Mildly Thick (level 2) (OK for fresh fruit (e.g., pineapple) per SLP)  Adult Formula Bolus Feeding: Novasource Renal; Route: Gastrostomy; 3 times daily; Volume per Bolus: 240; mL(s); Back up bolus for when pt consumes <50% of meals: 80 mL/hr x 3 hrs  Room Service    DVT Prophylaxis: DOAC  Nixon Catheter: Not present  Lines: PRESENT      CVC Double Lumen Right External jugular Tunneled-Site Assessment: WDL  Hemodialysis Vascular Access Arteriovenous fistula Left Forearm-Site Assessment: WDL (bruit, thrill audible)      Cardiac Monitoring: None  Code Status: Full Code      Clinically Significant Risk Factors           # Hypercalcemia: Highest Ca = 10.5 mg/dL in last 2 days, will monitor as appropriate    # Hypoalbuminemia: Lowest albumin = 1.9 g/dL at 1/23/2023  6:38 AM, will monitor as appropriate            # Severe Malnutrition: based on nutrition assessment        Disposition Plan     Expected Discharge Date: 04/19/2023, 12:00 PM  Discharge Delays: Placement - LTC  Destination: inpatient rehabilitation facility  Discharge Comments: Dialysis pt MWF. Will need placement TCU/LTC. [facility] willing to accept once no sitter, SW to follow up when sitter free          Pako Tan MD  Hospitalist Service  [facility]  Securely message with Mat[employer] (more info)  Text page via [employer] Paging/Directory   ______________________________________________________________________    Interval History     No new complaints.    Long discussion regarding disposition  planning.  Wife wondering about weaning Zyprexa      Physical Exam   /56 (BP Location: Right arm)   Pulse 74   Temp 97.9  F (36.6  C) (Oral)   Resp 18   Wt 80.8 kg (178 lb 2.1 oz)   SpO2 95%   BMI 24.16 kg/m    Gen- pleasant   Neck- supple  CVS- I+II+ no m/r/g  RS- CTAB  Abdo- soft, no tenderness . No g/r/r    Medical Decision Making       36 MINUTES SPENT BY ME on the date of service doing chart review, history, exam, documentation & further activities per the note.      Data   ------------------------- PAST 24 HR DATA REVIEWED -----------------------------------------------    I have personally reviewed the following data over the past 24 hrs:    N/A  \   N/A   / N/A     134 (L) 94 (L) 78.6 (H) /  125 (H)   4.3 25 5.55 (H) \       ALT: N/A AST: N/A AP: N/A TBILI: N/A   ALB: 3.7 TOT PROTEIN: N/A LIPASE: N/A

## 2023-04-16 NOTE — PLAN OF CARE
Goal Outcome Evaluation:    DATE & TIME: 4/16/23 [id number]  Cognitive Concerns/ Orientation : Pt A/Ox3, disoriented to situation. Confused.   BEHAVIOR & AGGRESSION TOOL COLOR: Green   ABNL VS/O2: VSS on RA  MOBILITY: Assist x1 with gait belt and walker, fall risk. Ambulating in halls and room frequently  PAIN MANAGMENT: Denies  DIET: Regular, mildly thick. Good appetite  BOWEL/BLADDER: Continent.  2 loose BM's this shift.  ABNL LAB/BG: Cr 5.55, GFR 11, Urea nitrogen 78.6  DRAIN/DEVICES: R chest HD CVC, PEG tube clamped, L arm fistula  SKIN: Scattered bruising, scabbing, dry/scaly skin  D/C DATE: Discharge to TCU pending placement  OTHER IMPORTANT INFO:

## 2023-04-16 NOTE — PLAN OF CARE
DATE & TIME: 4/15/23 [id number]   Cognitive Concerns/ Orientation : Pt A/Ox3, disoriented to situation. Confused.   BEHAVIOR & AGGRESSION TOOL COLOR: Green   ABNL VS/O2: VSS on RA  MOBILITY: Assist x1 with gait belt and walker, fall risk.   PAIN MANAGMENT: PRN tylenol given X1 for lower back pain.   DIET: Regular, mildly thick. Adequate  appetite  BOWEL/BLADDER: Continent.  loose BM's this shift, lactulose held for multiple stools   ABNL LAB/BG: Cr 4.89, GFR 13, Mg 1.9  DRAIN/DEVICES: R chest HD CVC, PEG tube clamped, L arm fistula  SKIN: Scattered bruising, scabbing, dry/scaly skin  D/C DATE: Discharge to TCU pending placement  OTHER IMPORTANT INFO: pt has  sitter during shift, melatonin 10 mg for insomnia per pt request

## 2023-04-16 NOTE — PLAN OF CARE
DATE & TIME: 4/16/23 [id number]  Cognitive Concerns/ Orientation : A&O x3-4, calm & cooperative. Forgetful   BEHAVIOR & AGGRESSION TOOL COLOR: Green   ABNL VS/O2: VSS on RA  MOBILITY: A x1 GB/W. Pt and family educated on falls risk precautions   PAIN MANAGMENT: Denies  DIET: Regular, mildly thick. Good appetite  BOWEL/BLADDER: Continent. Has had 4 loose BM's today, plan to hold Lactulose tonight  ABNL LAB/BG: Cr 5.55   DRAIN/DEVICES: R chest HD CVC, PEG tube clamped, L arm fistula  SKIN: Scattered bruising, scabbing, dry/scaly skin  TESTS/PROCEDURES: HD MWF  D/C DATE: Discharge to TCU pending placement  OTHER IMPORTANT INFO:

## 2023-04-17 LAB
ALBUMIN SERPL BCG-MCNC: 3.5 G/DL (ref 3.5–5.2)
ANION GAP SERPL CALCULATED.3IONS-SCNC: 15 MMOL/L (ref 7–15)
BUN SERPL-MCNC: 93.4 MG/DL (ref 6–20)
CALCIUM SERPL-MCNC: 10.4 MG/DL (ref 8.6–10)
CHLORIDE SERPL-SCNC: 96 MMOL/L (ref 98–107)
CREAT SERPL-MCNC: 6.7 MG/DL (ref 0.67–1.17)
DEPRECATED HCO3 PLAS-SCNC: 24 MMOL/L (ref 22–29)
GFR SERPL CREATININE-BSD FRML MDRD: 9 ML/MIN/1.73M2
GLUCOSE BLDC GLUCOMTR-MCNC: 78 MG/DL (ref 70–99)
GLUCOSE BLDC GLUCOMTR-MCNC: 83 MG/DL (ref 70–99)
GLUCOSE SERPL-MCNC: 109 MG/DL (ref 70–99)
MAGNESIUM SERPL-MCNC: 2.1 MG/DL (ref 1.7–2.3)
PHOSPHATE SERPL-MCNC: 6.6 MG/DL (ref 2.5–4.5)
POTASSIUM SERPL-SCNC: 5.1 MMOL/L (ref 3.4–5.3)
SODIUM SERPL-SCNC: 135 MMOL/L (ref 136–145)

## 2023-04-17 PROCEDURE — 36415 COLL VENOUS BLD VENIPUNCTURE: CPT | Performed by: STUDENT IN AN ORGANIZED HEALTH CARE EDUCATION/TRAINING PROGRAM

## 2023-04-17 PROCEDURE — 99233 SBSQ HOSP IP/OBS HIGH 50: CPT | Performed by: INTERNAL MEDICINE

## 2023-04-17 PROCEDURE — 250N000013 HC RX MED GY IP 250 OP 250 PS 637: Performed by: STUDENT IN AN ORGANIZED HEALTH CARE EDUCATION/TRAINING PROGRAM

## 2023-04-17 PROCEDURE — 99233 SBSQ HOSP IP/OBS HIGH 50: CPT | Mod: 24

## 2023-04-17 PROCEDURE — 82040 ASSAY OF SERUM ALBUMIN: CPT | Performed by: STUDENT IN AN ORGANIZED HEALTH CARE EDUCATION/TRAINING PROGRAM

## 2023-04-17 PROCEDURE — 634N000001 HC RX 634: Performed by: INTERNAL MEDICINE

## 2023-04-17 PROCEDURE — 250N000013 HC RX MED GY IP 250 OP 250 PS 637

## 2023-04-17 PROCEDURE — 250N000012 HC RX MED GY IP 250 OP 636 PS 637: Performed by: STUDENT IN AN ORGANIZED HEALTH CARE EDUCATION/TRAINING PROGRAM

## 2023-04-17 PROCEDURE — 250N000013 HC RX MED GY IP 250 OP 250 PS 637: Performed by: PSYCHIATRY & NEUROLOGY

## 2023-04-17 PROCEDURE — 90937 HEMODIALYSIS REPEATED EVAL: CPT

## 2023-04-17 PROCEDURE — 83735 ASSAY OF MAGNESIUM: CPT | Performed by: STUDENT IN AN ORGANIZED HEALTH CARE EDUCATION/TRAINING PROGRAM

## 2023-04-17 PROCEDURE — 99233 SBSQ HOSP IP/OBS HIGH 50: CPT | Performed by: HOSPITALIST

## 2023-04-17 PROCEDURE — 90935 HEMODIALYSIS ONE EVALUATION: CPT

## 2023-04-17 PROCEDURE — 120N000001 HC R&B MED SURG/OB

## 2023-04-17 PROCEDURE — 250N000013 HC RX MED GY IP 250 OP 250 PS 637: Performed by: HOSPITALIST

## 2023-04-17 RX ORDER — ALBUMIN (HUMAN) 12.5 G/50ML
50 SOLUTION INTRAVENOUS
Status: DISCONTINUED | OUTPATIENT
Start: 2023-04-17 | End: 2023-04-18

## 2023-04-17 RX ADMIN — OLANZAPINE 2.5 MG: 5 TABLET, ORALLY DISINTEGRATING ORAL at 21:05

## 2023-04-17 RX ADMIN — APIXABAN 5 MG: 5 TABLET, FILM COATED ORAL at 21:05

## 2023-04-17 RX ADMIN — SEVELAMER CARBONATE 800 MG: 800 TABLET, FILM COATED ORAL at 11:52

## 2023-04-17 RX ADMIN — ROPINIROLE HYDROCHLORIDE 0.5 MG: 0.25 TABLET, FILM COATED ORAL at 21:05

## 2023-04-17 RX ADMIN — PANTOPRAZOLE SODIUM 40 MG: 40 TABLET, DELAYED RELEASE ORAL at 07:31

## 2023-04-17 RX ADMIN — ACETAMINOPHEN 650 MG: 325 TABLET, FILM COATED ORAL at 18:46

## 2023-04-17 RX ADMIN — LACOSAMIDE 100 MG: 100 TABLET, FILM COATED ORAL at 01:30

## 2023-04-17 RX ADMIN — ACETAMINOPHEN 650 MG: 325 TABLET, FILM COATED ORAL at 05:33

## 2023-04-17 RX ADMIN — ACETAMINOPHEN 650 MG: 325 TABLET, FILM COATED ORAL at 23:07

## 2023-04-17 RX ADMIN — FOLIC ACID 1 MG: 1 TABLET ORAL at 07:31

## 2023-04-17 RX ADMIN — LACOSAMIDE 100 MG: 100 TABLET, FILM COATED ORAL at 23:06

## 2023-04-17 RX ADMIN — EPOETIN ALFA-EPBX 10000 UNITS: 10000 INJECTION, SOLUTION INTRAVENOUS; SUBCUTANEOUS at 10:35

## 2023-04-17 RX ADMIN — MIDODRINE HYDROCHLORIDE 10 MG: 5 TABLET ORAL at 17:32

## 2023-04-17 RX ADMIN — RIFAXIMIN 550 MG: 550 TABLET ORAL at 21:05

## 2023-04-17 RX ADMIN — RIFAXIMIN 550 MG: 550 TABLET ORAL at 07:31

## 2023-04-17 RX ADMIN — OLANZAPINE 5 MG: 5 TABLET, ORALLY DISINTEGRATING ORAL at 12:03

## 2023-04-17 RX ADMIN — ACETAMINOPHEN 650 MG: 325 TABLET, FILM COATED ORAL at 11:52

## 2023-04-17 RX ADMIN — RAMELTEON 8 MG: 8 TABLET, FILM COATED ORAL at 21:05

## 2023-04-17 RX ADMIN — Medication 1 CAPSULE: at 11:52

## 2023-04-17 RX ADMIN — LACTULOSE 10 G: 10 SOLUTION ORAL at 11:52

## 2023-04-17 RX ADMIN — APIXABAN 5 MG: 5 TABLET, FILM COATED ORAL at 07:31

## 2023-04-17 RX ADMIN — TACROLIMUS 1 MG: 1 CAPSULE ORAL at 07:31

## 2023-04-17 RX ADMIN — LACOSAMIDE 100 MG: 100 TABLET, FILM COATED ORAL at 11:53

## 2023-04-17 RX ADMIN — SEVELAMER CARBONATE 800 MG: 800 TABLET, FILM COATED ORAL at 17:32

## 2023-04-17 RX ADMIN — LACTULOSE 10 G: 10 SOLUTION ORAL at 21:05

## 2023-04-17 RX ADMIN — MELATONIN 5 MG TABLET 10 MG: at 21:05

## 2023-04-17 RX ADMIN — MIDODRINE HYDROCHLORIDE 10 MG: 5 TABLET ORAL at 11:53

## 2023-04-17 RX ADMIN — TACROLIMUS 1 MG: 1 CAPSULE ORAL at 21:05

## 2023-04-17 RX ADMIN — LEVETIRACETAM 1000 MG: 500 TABLET, FILM COATED ORAL at 21:05

## 2023-04-17 ASSESSMENT — ACTIVITIES OF DAILY LIVING (ADL)
ADLS_ACUITY_SCORE: 48
ADLS_ACUITY_SCORE: 46
ADLS_ACUITY_SCORE: 48

## 2023-04-17 NOTE — CONSULTS
"Psychiatry consult noted for patient request to discuss possibility of weaning off olanzapine. I briefly met with Blanco in his room after dialysis today, he was quite drowsy and had difficulty participating in assessment. Some of his responses did not make sense, he stated \"I'm just blanking today\". His wife Ofe was at the bedside and helped provide some input, but Blanco requested that we wait until he can speak to his brother Dylan instead and for me to come back tomorrow. Will re-attempt on 4/18.      STEFFANY Del Castillo CNP   Consult/Liaison Psychiatry   [facility]    Contact information available via [facility] Paging/Directory  If I am not available, then [facility] CL line ([phone]) should know who is covering our consult service.        "

## 2023-04-17 NOTE — PLAN OF CARE
Goal Outcome Evaluation:    DATE & TIME: 4/17/23 7a to 3p  Cognitive Concerns/ Orientation : A&O x2 to 3 waxes and wanes, calm & cooperative  BEHAVIOR & AGGRESSION TOOL COLOR: Green             ABNL VS/O2: VSS on RA  MOBILITY: A x1 GB/W. Pt and family educated on falls risk precautions   PAIN MANAGMENT: Tylenol x 1 for back pain  DIET: Regular, mildly thick. Good appetite  BOWEL/BLADDER: Continent  ABNL LAB/BG: Cr 6.7 from 5.55   DRAIN/DEVICES: R chest HD CVC, PEG tube clamped flushed, L arm fistula  SKIN: Scattered bruising, scabbing, dry/scaly skin  TESTS/PROCEDURES: HD MWF  D/C DATE: Discharge to TCU pending placement  OTHER IMPORTANT INFO: Psyche consulted regarding weaning pt.f rom Zyprexa.

## 2023-04-17 NOTE — CONSULTS
Psychiatry Consultation; Follow up              Reason for Consult, requesting source:    Zyprexa   Requesting source: Dominick    Labs and imaging reviewed, seen by STEFFANY Michael CNP    Total time spent in chart review, patient interview and coordination of care; 60 minutes                Interim history:    Blanco Osborne is a 57 year-old male admitted on 1/21/2023 as a transfer from [facility] for evaluation of loculated pleural effusion. He has extensive PMH including h/o liver transplant 2016 due to alcohol abuse, pAF on chronic anticoagulation, history of diabetes mellitus type 2, CVA, hypertension, CHRISTIAN on BiPAP.  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 He was recovering in [facility] and was transferred to [facility] for consideration of chest tube placement and possible intrapleural lysis for worsening effusion.    Patient was on Prozac and Vistaril but these were discontinued while in the ICU due to delirium. He was put on Zyprexa for agitation anxiety and sleep. His delirium and agitation has since resolved. He states he still has trouble sleeping.         Current Medications:       - MEDICATION INSTRUCTIONS for Dialysis Patients -   Does not apply See Admin Instructions     sodium chloride 0.9%  250 mL Intravenous Once in dialysis/CRRT     sodium chloride 0.9%  300 mL Hemodialysis Machine Once     apixaban ANTICOAGULANT  5 mg Oral BID     folic acid  1 mg Oral or Feeding Tube Daily     sodium chloride (PF) 0.9%  10 mL Intracatheter Once in dialysis/CRRT    Followed by     heparin  1.3-2.6 mL Intracatheter Once in dialysis/CRRT     sodium chloride (PF) 0.9%  10 mL Intracatheter Once in dialysis/CRRT    Followed by     heparin  1.3-2.6 mL Intracatheter Once in dialysis/CRRT     Lacosamide  " 100 mg Oral Q12H     lactulose  10 g Oral BID     levETIRAcetam  1,000 mg Oral Q24H     lidocaine   Transdermal Q8H BIJU     menthol   Transdermal Q8H     midodrine  10 mg Oral or Feeding Tube TID w/meals     multivitamin RENAL  1 capsule Oral Daily     - MEDICATION INSTRUCTIONS -   Does not apply Once     OLANZapine zydis  5 mg Oral At Bedtime     OLANZapine zydis  5 mg Oral Once per day on Mon Wed Fri     pantoprazole  40 mg Oral QAM AC     ramelteon  8 mg Oral QPM     rifaximin  550 mg Oral or Feeding Tube BID     rOPINIRole  0.5 mg Oral At Bedtime     sevelamer carbonate  800 mg Oral TID w/meals     sodium chloride (PF)  3 mL Intracatheter Q8H     tacrolimus  1 mg Oral Q12H              MSE:   Appearance: awake, alert, adequately groomed and dressed in hospital scrubs  Attitude:  cooperative  Eye Contact:  good  Mood:  \"fair\"  Affect:  : mood congruent  Speech:  clear, coherent  Psychomotor Behavior:  no evidence of tardive dyskinesia, dystonia, or tics  Muscle strength and tone: baseline   Thought Process:  logical, linear and goal oriented  Associations:  no loose associations  Thought Content:  no evidence of suicidal ideation or homicidal ideation and no evidence of psychotic thought  Insight:  fair  Judgement:  fair  Oriented to:  time, person, and place  Attention Span and Concentration:  intact  Recent and Remote Memory:  fair    Vital signs:  Temp: 97.6  F (36.4  C) Temp src: Oral BP: 102/65 Pulse: 78   Resp: 26 SpO2: 96 % O2 Device: None (Room air) Oxygen Delivery: 2 LPM   Weight: 82.1 kg (180 lb 14.4 oz)  Estimated body mass index is 24.53 kg/m  as calculated from the following:    Height as of 11/12/22: 1.829 m (6').    Weight as of this encounter: 82.1 kg (180 lb 14.4 oz).             DSM-5 Diagnosis:   Prior hospital delirium   Adjustment disorder, mixed           Assessment:   As patients delirium and mental status has improved, we can start tapering off Zyprexa. Patient complaining of poor sleep " "despite zyprexa and rozerem. Could trial Trazodone          Summary of Recommendations:     1. Decreased bedtime Zyprexa to 2.5mg     2. Consider adding Trazodone PRN for sleep      Lisbet Liu, PMHNP-BC  Consult/Liaison Psychiatry   [facility]       \"Much or all of the text in this note was generated through the use of Dragon Dictate voice to text software. Errors in spelling or words which appear to be out of contact are unintentional, may be present due having escaped editing\"           "

## 2023-04-17 NOTE — PROGRESS NOTES
Assessment and Plan:   ESRD: seen on dialysis. R CVC with poor BFR. Will use TPA and try again next Wednesday. If poor BFR then ask IR to replace. No heparin, he is on Eliquis.   On EPO. Plan 2.5 L UF, 350 BFR, 3h, no heparin, 3K 35 HCO3 and 140 Na.             Interval History:   Hepatic encephalopathy  Liver failure: S/P transplant, on tacrolimus.   Resp failure: resolved.  Afib:     Anemia: on EPO and folate.               Review of Systems:   No complaints on dialysis.           Medications:       - MEDICATION INSTRUCTIONS for Dialysis Patients -   Does not apply See Admin Instructions     sodium chloride 0.9%  250 mL Intravenous Once in dialysis/CRRT     sodium chloride 0.9%  300 mL Hemodialysis Machine Once     apixaban ANTICOAGULANT  5 mg Oral BID     epoetin mirta-epbx  10,000 Units Intravenous Once     folic acid  1 mg Oral or Feeding Tube Daily     sodium chloride (PF) 0.9%  10 mL Intracatheter Once in dialysis/CRRT    Followed by     heparin  1.3-2.6 mL Intracatheter Once in dialysis/CRRT     sodium chloride (PF) 0.9%  10 mL Intracatheter Once in dialysis/CRRT    Followed by     heparin  1.3-2.6 mL Intracatheter Once in dialysis/CRRT     Lacosamide  100 mg Oral Q12H     lactulose  10 g Oral BID     levETIRAcetam  1,000 mg Oral Q24H     lidocaine   Transdermal Q8H BIJU     menthol   Transdermal Q8H     midodrine  10 mg Oral or Feeding Tube TID w/meals     multivitamin RENAL  1 capsule Oral Daily     - MEDICATION INSTRUCTIONS -   Does not apply Once     OLANZapine zydis  5 mg Oral At Bedtime     OLANZapine zydis  5 mg Oral Once per day on Mon Wed Fri     pantoprazole  40 mg Oral QAM AC     ramelteon  8 mg Oral QPM     rifaximin  550 mg Oral or Feeding Tube BID     rOPINIRole  0.5 mg Oral At Bedtime     sevelamer carbonate  800 mg Oral TID w/meals     sodium chloride (PF)  3 mL Intracatheter Q8H     tacrolimus  1 mg Oral Q12H       - MEDICATION INSTRUCTIONS -       - MEDICATION INSTRUCTIONS -        Current active medications and PTA medications reviewed, see medication list for details.            Physical Exam:   Vitals were reviewed  Patient Vitals for the past 24 hrs:   BP Temp Temp src Pulse Resp SpO2 Weight   23 0915 94/63 -- -- 78 30 94 % --   23 0900 92/64 -- -- 78 25 93 % --   23 0845 94/59 -- -- 75 25 94 % --   23 0835 95/65 -- -- 79 26 93 % --   23 0830 (!) 88/58 -- -- 81 27 94 % --   23 0815 94/62 -- -- 79 25 94 % --   23 0800 99/62 -- -- 71 19 93 % --   23 0750 103/61 -- -- 69 23 -- --   23 0745 104/68 -- -- 71 24 -- --   23 0730 -- 98.4  F (36.9  C) Oral -- -- -- --   23 0719 113/67 97.8  F (36.6  C) Oral 79 18 95 % --   23 0700 -- -- -- -- -- -- 82.1 kg (180 lb 14.4 oz)   23 2100 131/79 98.2  F (36.8  C) Axillary 73 16 98 % --   23 1812 110/65 -- -- 77 18 98 % --   23 1549 108/68 97.9  F (36.6  C) Oral 73 18 99 % --       Temp:  [97.8  F (36.6  C)-98.4  F (36.9  C)] 98.4  F (36.9  C)  Pulse:  [69-81] 78  Resp:  [16-30] 30  BP: ()/(58-79) 94/63  SpO2:  [93 %-99 %] 94 %    Temperatures:  Current - Temp: 98.4  F (36.9  C); Max - Temp  Av.1  F (36.7  C)  Min: 97.8  F (36.6  C)  Max: 98.4  F (36.9  C)  Respiration range: Resp  Av.6  Min: 16  Max: 30  Pulse range: Pulse  Av.6  Min: 69  Max: 81  Blood pressure range: Systolic (24hrs), Av , Min:88 , Max:131   ; Diastolic (24hrs), Av, Min:58, Max:79    Pulse oximetry range: SpO2  Av %  Min: 93 %  Max: 99 %    I/O last 3 completed shifts:  In: 600 [P.O.:600]  Out: -       Intake/Output Summary (Last 24 hours) at 2023 0923  Last data filed at 2023 1206  Gross per 24 hour   Intake 240 ml   Output --   Net 240 ml       Resting in bed  R CVC with no redness or tenderness  Abd with PEG in place for TFvf       Wt Readings from Last 4 Encounters:   23 82.1 kg (180 lb 14.4 oz)   23 82.4 kg (181 lb 11.2 oz)    12/28/22 96 kg (211 lb 10.3 oz)   12/16/22 100.2 kg (221 lb)          Data:          Lab Results   Component Value Date     04/17/2023     04/16/2023     04/15/2023     04/20/2020     10/07/2019     02/20/2019    Lab Results   Component Value Date    CHLORIDE 96 04/17/2023    CHLORIDE 94 04/16/2023    CHLORIDE 94 04/15/2023    CHLORIDE 103 12/29/2022    CHLORIDE 103 12/28/2022    CHLORIDE 102 12/27/2022    CHLORIDE 105 08/05/2020    CHLORIDE 106 04/20/2020    CHLORIDE 99 10/07/2019    CHLORIDE 108 08/01/2019    CHLORIDE 106 02/20/2019    Lab Results   Component Value Date    BUN 93.4 04/17/2023    BUN 78.6 04/16/2023    BUN 66.6 04/15/2023    BUN 46 12/29/2022    BUN 62 12/28/2022    BUN 52 12/27/2022    BUN 23 04/20/2020    BUN 18 10/07/2019    BUN 20 02/20/2019      Lab Results   Component Value Date    POTASSIUM 5.1 04/17/2023    POTASSIUM 4.3 04/16/2023    POTASSIUM 4.2 04/15/2023    POTASSIUM 3.6 01/19/2023    POTASSIUM  01/18/2023      Comment:      Disregard results.      POTASSIUM 3.6 01/17/2023    POTASSIUM 3.4 12/29/2022    POTASSIUM 3.7 12/28/2022    POTASSIUM 3.7 12/28/2022    POTASSIUM 3.9 04/20/2020    POTASSIUM 4.3 10/07/2019    POTASSIUM 4.2 02/20/2019    Lab Results   Component Value Date    CO2 24 04/17/2023    CO2 25 04/16/2023    CO2 27 04/15/2023    CO2 31 12/29/2022    CO2 29 12/28/2022    CO2 29 12/27/2022    CO2 28 04/20/2020    CO2 26 10/07/2019    CO2 24 02/20/2019    Lab Results   Component Value Date    CR 6.70 04/17/2023    CR 5.55 04/16/2023    CR 4.89 04/15/2023    CR 1.06 04/20/2020    CR 1.01 10/07/2019    CR 1.08 02/20/2019        Recent Labs   Lab Test 04/14/23  0725 04/12/23  0755 04/08/23  0859   WBC 3.6* 2.9* 3.4*   HGB 8.9* 8.7* 9.0*   HCT 29.1* 28.7* 30.3*   MCV 97 97 101*   * 95* 95*     Recent Labs   Lab Test 04/05/23  0959 03/25/23  1150 03/14/23  1033 03/13/23  1454 03/12/23  1116 03/11/23  0846 03/10/23  1457   AST 16  --  20  21  --    < > 23   ALT <5*  --  8* 9*  --    < > 9*   ALKPHOS 184*  --  177* 182*  --    < > 231*   BILITOTAL 0.4  --  0.4 0.4  --    < > 0.4   CHANDLER  --  69*  --   --  64*  --  30    < > = values in this interval not displayed.       Recent Labs   Lab Test 04/17/23  0750 04/16/23  1004 04/15/23  1852   MAG 2.1 2.0 1.9     Recent Labs   Lab Test 04/17/23  0750 04/16/23  1004 04/15/23  1852   PHOS 6.6* 5.9* 4.8*     Recent Labs   Lab Test 04/17/23 0750 04/16/23  1004 04/15/23  1852   KELLY 10.4* 10.5* 10.0       Lab Results   Component Value Date    KELLY 10.4 (H) 04/17/2023     Lab Results   Component Value Date    WBC 3.6 (L) 04/14/2023    HGB 8.9 (L) 04/14/2023    HCT 29.1 (L) 04/14/2023    MCV 97 04/14/2023     (L) 04/14/2023     Lab Results   Component Value Date     (L) 04/17/2023    POTASSIUM 5.1 04/17/2023    CHLORIDE 96 (L) 04/17/2023    CO2 24 04/17/2023     (H) 04/17/2023     Lab Results   Component Value Date    BUN 93.4 (H) 04/17/2023    CR 6.70 (H) 04/17/2023     Lab Results   Component Value Date    MAG 2.1 04/17/2023     Lab Results   Component Value Date    PHOS 6.6 (H) 04/17/2023       Creatinine   Date Value Ref Range Status   04/17/2023 6.70 (H) 0.67 - 1.17 mg/dL Final   04/16/2023 5.55 (H) 0.67 - 1.17 mg/dL Final   04/15/2023 4.89 (H) 0.67 - 1.17 mg/dL Final   04/14/2023 5.40 (H) 0.67 - 1.17 mg/dL Final   04/13/2023 4.63 (H) 0.67 - 1.17 mg/dL Final   04/12/2023 5.42 (H) 0.67 - 1.17 mg/dL Final   04/20/2020 1.06 0.66 - 1.25 mg/dL Final   10/07/2019 1.01 0.66 - 1.25 mg/dL Final   02/20/2019 1.08 0.66 - 1.25 mg/dL Final   07/10/2018 1.32 (H) 0.66 - 1.25 mg/dL Final   10/31/2017 1.18 0.66 - 1.25 mg/dL Final   10/17/2017 1.26 (H) 0.66 - 1.25 mg/dL Final       Attestation:  I have reviewed today's vital signs, notes, medications, labs and imaging.  Seen on dialysis.      Elvis Mariano MD

## 2023-04-17 NOTE — PROGRESS NOTES
Care Management Follow Up    Length of Stay (days): 86    Expected Discharge Date: 04/26/2023     Concerns to be Addressed: discharge planning     Patient plan of care discussed at interdisciplinary rounds: Yes    Anticipated Discharge Disposition: TCU     Anticipated Discharge Services:  Rehab, dialysis  Anticipated Discharge DME:  marta    Patient/family educated on Medicare website which has current facility and service quality ratings:  yes  Education Provided on the Discharge Plan:  yes  Patient/Family in Agreement with the Plan: yes    Referrals Placed by CM/SW:  TCU, [facility]  Private pay costs discussed: Not applicable    Additional Information:  Patient is making progress from two weeks ago when writer last saw patient.  Met with patient, his Spouse Ofe and his Brother Dylan.  Ofe seems to lean on Dlyan for decision making.  The focus is still on TCU placement.  Patient has [facility] for insurance that means we will definitely need therapy notes from OT and PT.  SLP is also working with patient.  Have asked for OT to be ordered again as will need to understand patient's cognitive ability.  Hopefully his insurance will approve a TCU.  Ofe works three days per week. Patient will also need a secured dialysis unit before he discharges.  A referral had been [facility] to [facility] by a co-worker.  Discussed dialysis transportation.  Ofe voiced interest in [facility] and is aware that she will need to sign up 3-4 weeks in advance.    Care Management will continue to follow for safe discharge planning.      Rosalba Yoo, RN   Inpatient Care management  [phone]

## 2023-04-17 NOTE — PLAN OF CARE
Cognitive Concerns/ Orientation : A&O x3-4, calm & cooperative. Forgetful   BEHAVIOR & AGGRESSION TOOL COLOR: Green             ABNL VS/O2: VSS on RA  MOBILITY: A x1 GB/W. Pt educated on falls risk precautions   PAIN MANAGMENT: PRN Tylenol Given  DIET: Regular, mildly thick. Good appetite  BOWEL/BLADDER: Continent. Has had 4 loose BM's today, hold Lactulose tonight  ABNL LAB/BG: Cr 5.55   DRAIN/DEVICES: R chest HD CVC, PEG tube clamped, L arm fistula  SKIN: Scattered bruising, scabbing, dry/scaly skin  TESTS/PROCEDURES: HD MWF  D/C DATE: Discharge to TCU pending placement

## 2023-04-17 NOTE — PROGRESS NOTES
Care Management Follow Up    Spoke with patient and spouse regarding discharge plans. Per MD since it is getting difficult for placement and patient is improving with mobility,would need to work with patient in hospital and work with rehab and eventually discharge home.   Spoke with patients spouse and discussed options that poss discharge home by 4/21 with home care. Patients spouse requested writer to call patients brother and discuss options.She would also discuss with him.  Patients spouse stated she will probably have to retire or have patient move to [place] to be with his family while he recuperates.Writer attempted to call once and unable to reach him. Outpatient dialysis referral started. Family unsure at this time which dialysis unit to be used. Writer send request to the zip code that patient lives.   Carecoordinator to re eval on Monday regarding disposition.  Noted therapy continues to recommend TCU. SW to work on placement simultaneously.    Louann Machado RN CC  [phone]

## 2023-04-17 NOTE — PROGRESS NOTES
Potassium   Date Value Ref Range Status   04/17/2023 5.1 3.4 - 5.3 mmol/L Final   12/29/2022 3.4 3.4 - 5.3 mmol/L Final   04/20/2020 3.9 3.4 - 5.3 mmol/L Final     Potassium POCT   Date Value Ref Range Status   01/19/2023 3.6 3.5 - 5.0 mmol/L Final     Hemoglobin   Date Value Ref Range Status   04/14/2023 8.9 (L) 13.3 - 17.7 g/dL Final   04/20/2020 14.3 13.3 - 17.7 g/dL Final     Creatinine   Date Value Ref Range Status   04/17/2023 6.70 (H) 0.67 - 1.17 mg/dL Final   04/20/2020 1.06 0.66 - 1.25 mg/dL Final     Urea Nitrogen   Date Value Ref Range Status   04/17/2023 93.4 (H) 6.0 - 20.0 mg/dL Final   12/29/2022 46 (H) 7 - 30 mg/dL Final   04/20/2020 23 7 - 30 mg/dL Final     Sodium   Date Value Ref Range Status   04/17/2023 135 (L) 136 - 145 mmol/L Final   04/20/2020 139 133 - 144 mmol/L Final     INR   Date Value Ref Range Status   12/21/2022 1.36 (H) 0.85 - 1.15 Final   10/07/2019 1.20 (H) 0.86 - 1.14 Final       DIALYSIS PROCEDURE NOTE  Hepatitis status of previous patient on machine log was checked and verified ok to use with this patients hepatitis status.  Patient dialyzed for 3 hrs. on a K3 bath for 2 hours then K2 bath for an hour with result back from this am with a net fluid removal of  2.5L.  A BFR of 250-300 ml/min was obtained via a RIJ catheter.   The treatment plan was discussed with Dr. Grace during the treatment.    Total heparin received during the treatment: 0 units.   Line flushed, clamped and capped with heparin 1:1000 1.9 mL (1900 units) per lumen     Meds  given: EPO 10,000units IV, TPA dwell in lines post HD  Complications: CVC not functioning well so TPA instilled for 30 min post HD, will assess CVC function at next HD Wednesday    Person educated: pt. Knowledge base none. Barriers to learning: none. Educated on access care via verbal mode. Patient verbalized understanding. Pt prefers verbal education style.      ICEBOAT? Timeout performed pre-treatment  I: Patient was identified using 2  identifiers  C:  Consent Signed Yes  E: Equipment preventative maintenance is current and dialysis delivery system OK to use  B: Hepatitis B Surface Antigen: neg; Draw Date: 3/29/23      Hepatitis B Surface Antibody: succept; Draw Date: 3/29/23  O: Dialysis orders present and complete prior to treatment  A: Vascular access verified and assessed prior to treatment  T: Treatment was performed at a clinically appropriate time  ?: Patient was allowed to ask questions and address concerns prior to treatment  See Adult Hemodialysis flowsheet in [facility] for further details and post assessment.  Machine water alarm in place and functioning. Transducer pods intact and checked every 15min.   Pt returned via bed.  Chlorine/Chloramine water system checked every 4 hours.  Outpatient Dialysis at [facility]     Patient repositioned every 2 hours during the treatment.  Post treatment report given to AUSTIN Mcmullen RN regarding 2.5L of fluid removed, last BP of 102/65, and patient pain rating of 5/10.

## 2023-04-17 NOTE — PROGRESS NOTES
[facility]    Medicine Progress Note - Hospitalist Service    Date of Admission:  1/21/2023    Assessment & Plan     Blanco Osborne is a 57 year-old male admitted on 1/21/2023 as a transfer from [facility] for evaluation of loculated pleural effusion. He has extensive PMH including h/o liver transplant 2016 due to alcohol abuse, pAF on chronic anticoagulation, history of diabetes mellitus type 2, CVA, hypertension, CHRISTIAN on BiPAP.  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 He was recovering in [facility] and was transferred to [facility] for consideration of chest tube placement and possible intrapleural lysis for worsening effusion.     Acute metabolic encephalopathy with delirium, multifactorial, -resolved  Hepatic encephalopathy  Chronic liver disease, history of liver transplant 2016  End-stage renal disease (ESRD), on hemodialysis (HD)  History of seizure, on Keppra and Vimpat  Waxing and waning mentation, coinciding with lactulose being held at [facility]  Earlier in hospital stay, remained confused and had pulled out tracheostomy tube, PICC line and pulled his dialysis catheter.  Lines replaced.  Psychiatry consulted, recent follow-up 2/21, 2/28, see notes.    Mental status continues to fluctuate with periods of alertness and increased sleepiness, overall improving.    Complicated, prolonged hospital course with mental status concerns multifactorial related to hepatic encephalopathy, end-stage renal disease on dialysis, past PEA arrest, seizure disorder, medication, and chronic liver disease with prior liver transplant.  As mental status fluctuates will continue to require one-to-one sitter as needed for added safety and supervision.  Reassess daily.    Continue to reorient and  redirect as able.  Avoid restraints if possible with goals of safety and close supervision (given multiple lines and tubes - dialysis catheter, PEG tube, IV, etc).    Maintain normal day/night cycles and sleep-wake cycles.  Minimize sedating medications as able.  Remains weak and deconditioned with complex, prolonged hospital course and limited mobility.  Encouragement and support offered daily to patient.    Continue Lactulose to 10 mg BID, monitor for symptoms; goal to have 2-3 bowel movements per day.    Ongoing hemodialysis, as per nephrology, 3X/week dialysis schedule. Nephrology following    Continue ramelteon 8 mg at bedtime; monitor for side effects including AM sedation, reassess daily.    Started on olanzapine 5 mg BID and PRN earlier during this hospitalization. Delirium improved, discontinued AM dose of olanzapine on non-dialysis days.  Psychiatry has been reconsulted for further dosage adjustment as needed.    Seizure disorder stable, continue Keppra and Vimpat. No recurrent seizures reported of recent.     Neurology consult appreciated.  Patient has been started on ropinirole for restless leg syndrome.     Bilateral pleural effusions   Acute respiratory failure requiring tracheostomy  - resolved    Pneumonia  - resolved   ARDS  - resolved    Right pneumothorax - resolved   *Initial event was parainfluenza and strep pneumo pneumonia back in November 2022. Treated for ARDS. Had failed extubation x2 and tracheostomy performed on previous hospitalization. *Had additional complications of bilateral pneumothoraces as well as hydropneumothorax- pleural fluid grew candida parapsilosis  *Completed 4-week antifungal treatment. While in [facility] he was successfully weaned from the ventilator.  *Recently there were concern for worsening effusion while at [facility]ACH and had thoracentesis 01/13 which returned exudative without growth on cultures. CT chest/abdomen/pelvis on 01/18 demonstrated worsening effusions and concerns  for infected parapneumonic effusions with possible SBP.  Multiple pulmonologist at [facility]ACH reviewed CT scan recommended transfer to [facility] for consideration of chest tube placement and possible intrapleural lysis  *Thoracic surgery (Dr. Jackson) consulted during admission   *CT chest 1/22, small effusions on imaging and so chest tube was not inserted.   ID consulted, see note 2/10.  *Patient pulled out his tracheostomy tube on the night 2/1-2/2 and is tolerating well without increased shortness of breath persistent cough or worsening hypoxia.  * Chest x-ray 2/3 with cardiomegaly, chronic small bilateral pleural effusions, no pulmonary edema; right internal jugular dialysis catheter in place.   3/12 Pulmonary consult, see note, concerns of hypercapnia, atelectasis, with consideration of BiPAP trial; no recommendations for antibiotics with infiltrates felt to be atelectasis.    Stable, continue to monitor respiratory status, recently stable on room air; occasional cough reported.    Encourage incentive spirometry as able, up to chair, deep breathing.    Wound care previously consulted for tracheostomy site care, monitor for signs and symptoms of infection, healing well - resolved.    Encourage ambulation.     Possible splenic infract  Wedge shaped area on CT scan chest and CT abdomen on 3/7/23.    Hematology consulted, suspicion for infarct was low.    TTE no thrombus or vegetations.    No abdominal pain CTM.     S/p liver transplant 2016   Chronic immunosuppression, on tacrolimus  Cirrhosis with ascites  Subacute bacterial peritonitis (SBP), resolved  *Main manifestations of this are hepatic encephalopathy and ascites.  Hepatologist at [facility] felt that most likely reason for the cirrhosis was metabolic syndrome or alcohol intake.  There was no evidence of rejection on biopsy and there was some evidence of regeneration. Paracentesis done on 12/22/2022 showed lactobacillus indicating SBP, treated with Unasyn and  "Augmentin, finished 2-week course 1/12/2023.  Requires large-volume paracentesis periodically for recurring ascites.    *Paracentesis 1/13/2023 yielded about 7500 cc. Cultures NGTD. paracentesis on 1/24 with 4.8 L fluid removal  Paracentesis on 3/6/23 No SBP    Continue intermittent paracentesis as needed if develops symptomatic ascites.    Monitor for signs and symptoms of recurrent spontaneous bacterial peritonitis.    Further treatment for recurrent ascites with TIPS deferred to his Liver Transplant team and/or Hepatology, he has an appointment on 4/21/2023 with Hepatology, Dr. Simms.    Continue Tacrolimus 1 mg BID, rifaximin 550 mg BID.    Continue lactulose as ordered, titrate as needed to have 2-3 bms.    GI consult as needed in hospital for new acute issues, otherwise as outpatient.    Encourgae high protein diet.    CT abdomen on March 9, 2023 showed small to moderate ascites.     Goals of care   As per prior rounding provider, \"on 1/25 nursing had mentioned that patient had refused to undergo dialysis and want to stop care, palliative care was consulted.  Patient and his spouse denied wanting to stop care and wanted ongoing restorative care including full code\"    Full Code status.    Needs placement to TCU -> SW working on placement. Discussed.      Diarrhea, improved, on lactulose for chronic liver disease    C difficile negative on 1/31. Secondary to lactulose. Discontinued rectal tube (2/12) as stools more formed.    Continue lactulose with goal of 2 to 3 soft bowel movement in 24 hours.     Paroxysmal atrial fibrillation  Chronic anticoagulation, apixaban  History of embolic strokes  Remains in sinus rhythm, on anticoagulation with apixaban indefinitely for stroke prophylaxis.      Monitor rhythm.    Continue apixaban anticoagulation, it was briefly held for fistula placement.    Monitor hemoglobin, see below.     Acute anemia  Patient has required intermittent transfusions for on-going anemia.  Anemia " most likely secondary to critical illness/chronic disease, chronic renal disease.  Baseline hemoglobin around 7-8.  Likely Epo resistance.    Hemoglobin stable at 8    Monitor intermittently.     Hypercalcemia  Work-up shows low/low normal vitamin D, suppressed PTH.  But PTH related peptide is elevated.    Nephrology following and considered prolia, however patient's wife would like to hold off for now.    Holding VIT D and Phoslo.     Discussed with Dr Moulton regarding elevated PTHrP, will review.      History PEA arrest   PEA arrest prior to his previous admission and 1 episode of PEA associated with desaturation on 12/20.  No structural cardiac disease.      Chronic Hypotension  In the past has developed baseline hypotension with cirrhosis and prolonged critical illness.  On hemodialysis.  Receiving midodrine and albumin during dialysis.    Continue midodrine, as per nephrology.      History of seizure   After hypoxic event, in the context of baseline multifactorial encephalopathy secondary to his ongoing critical illness and prior cardiac arrests and prior strokes and cirrhosis with hepatic encephalopathy. MRI of head of 12/20/22 reveals no PRES or leptomeningeal enhancement but scattered.  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   No recent seizure activity.    Continue levetiracetam, lacosamide.    Seizure precautions.    Outpatient follow-up with neurology      ESRD  Anemia due to renal disease  Hypotension during dialysis  Hyperphosphatemia    Nephrology reviewing vein mapping to assess options for internal access placement for dialysis, see note 3/16.    Underwent Fistula placement on  3/21/22.    Vascular surgery following-continue to use for tunnel catheter.  Outpatient follow-up with vascular surgery and for will need a follow-up ultrasound of the fistula in 6 weeks to assess patency.    Started sevalmer 800mg TID with meals on  3/27/23.    Complained of cramping with dialysis. Tried a dose of oxycodone prior to dialysis but this was not helpful. He will discuss the cramping with nephrology. Spouse prefers to avoid narcotic given encephalopathy.      Diabetes mellitus type 2  *Hemoglobin A1c on November 2022 was 4.7  *By report, on Lantus insulin in the past.  Blood sugar checks and sliding scale insulin previously discontinued as has been stable without insulin needs.    Monitor with periodic blood sugar checks as needed.     Hypomagnesemia    Resolved with replacement.     Severe malnutrition, protein and calorie type  Moderate dysphagia  G-tube feedings    Continue with tube feeds via PEG tube.    IR replaced the PEG tube on 2/8/2023, monitor.    Nutritionist consulted and following for tube feeds.    SLP following as well. Oral diet as per speech and language therapy (SLP). Patient hopeful to advance to thin liquids soon.    2/20 Nutrition following for tube feeds.    Now undergoing PRN tube feeds.    Encourage high-protein diet.    Nutrition notes reviewed, patient eating 0 to 100% of meals.  Continue current interventions.  As needed tube feed boluses available if patient consumes less than 50% of the meal.    Family encouraging patient to eat high protein diet including protein bars    Family asking about g tube removal. Per the EMR, last tube feed bolus was 3/22 if adequately documented. He refuses his tube feeds often per the dietitician. At this point, would favor keeping it in place given overall poor prognosis with ESRD, cirrhosis of transplanted liver, persistent encephalopathy, and inadequate oral intake. Event if he refuses, he is at risk for developing new infection or other decompensation and thus g tube as safety net.      Anxiety  Fluoxetine was discontinued as per psychiatry recommendation on 2/2 (see consult note for details).    Stable, monitor; comfort and reassurance offered during visit.    Psychiatry  follow-up.     Restless leg  Syndrome    Patient started on ropiranole by neurology.       Diet: Snacks/Supplements Adult: Other; Gelatein+ with brkfst and lunch, and magic cup with dinner (RD); With Meals  Combination Diet Regular Diet; Mildly Thick (level 2) (OK for fresh fruit (e.g., pineapple) per SLP)  Adult Formula Bolus Feeding: Novasource Renal; Route: Gastrostomy; 3 times daily; Volume per Bolus: 240; mL(s); Back up bolus for when pt consumes <50% of meals: 80 mL/hr x 3 hrs  Room Service    DVT Prophylaxis: DOAC  Nixon Catheter: Not present  Lines: PRESENT      CVC Double Lumen Right External jugular Tunneled-Site Assessment: WDL  Hemodialysis Vascular Access Arteriovenous fistula Left Forearm-Site Assessment: WDL      Cardiac Monitoring: None  Code Status: Full Code      Clinically Significant Risk Factors           # Hypercalcemia: Highest Ca = 10.5 mg/dL in last 2 days, will monitor as appropriate    # Hypoalbuminemia: Lowest albumin = 1.9 g/dL at 1/23/2023  6:38 AM, will monitor as appropriate            # Severe Malnutrition: based on nutrition assessment        Disposition Plan      Expected Discharge Date: 04/26/2023, 12:00 PM  Discharge Delays: Placement - LTC  Destination: inpatient rehabilitation facility  Discharge Comments: Dialysis pt MWF. Will need placement TCU/LTC. [facility] willing to accept once no sitter, SW to follow up when sitter free          Светлана Noble MD  Hospitalist Service  [facility]  Securely message with [employer] (more info)  Text page via [employer] Paging/Directory   ______________________________________________________________________    Interval History   Chart reviewed extensively  Evaluated patient this afternoon.  No acute concerns by nursing staff.  Patient had received Zyprexa this afternoon and was somewhat sleepy when I evaluated although woke up and started answering questions appropriately.  Spouse at bedside.  She is concerned about him  being sleepy.  Discussed medication management and that psychiatry consult is pending and they are considering decreasing dose of Zyprexa versus discontinuing it.    Physical Exam   Vital Signs: Temp: 97.6  F (36.4  C) Temp src: Oral BP: 102/65 Pulse: 78   Resp: 26 SpO2: 96 % O2 Device: None (Room air)    Weight: 180 lbs 14.4 oz    General: AAOx self and place, appears comfortable.  HEENT: PERRLA EOMI. Mucosa moist.   Lungs: Bilateral equal air entry. Clear to auscultation, normal work of breathing.   CVS: S1S2 regular, no tachycardia or murmur.   Abdomen: Soft, NT, ND. BS heard.  MSK: No edema or deformities.  Neuro:  CN 2-12 normal. Strength symmetrical.  Skin: No rash.       Medical Decision Making       50 MINUTES SPENT BY ME on the date of service doing chart review, history, exam, documentation & further activities per the note.      Data     I have personally reviewed the following data over the past 24 hrs:    N/A  \   N/A   / N/A     135 (L) 96 (L) 93.4 (H) /  109 (H)   5.1 24 6.70 (H) \       ALT: N/A AST: N/A AP: N/A TBILI: N/A   ALB: 3.5 TOT PROTEIN: N/A LIPASE: N/A       Imaging results reviewed over the past 24 hrs:   No results found for this or any previous visit (from the past 24 hour(s)).

## 2023-04-18 ENCOUNTER — APPOINTMENT (OUTPATIENT)
Dept: OCCUPATIONAL THERAPY | Facility: CLINIC | Age: 58
DRG: 981 | End: 2023-04-18
Attending: HOSPITALIST
Payer: COMMERCIAL

## 2023-04-18 ENCOUNTER — APPOINTMENT (OUTPATIENT)
Dept: PHYSICAL THERAPY | Facility: CLINIC | Age: 58
DRG: 981 | End: 2023-04-18
Attending: STUDENT IN AN ORGANIZED HEALTH CARE EDUCATION/TRAINING PROGRAM
Payer: COMMERCIAL

## 2023-04-18 LAB
ALBUMIN SERPL BCG-MCNC: 3.7 G/DL (ref 3.5–5.2)
ANION GAP SERPL CALCULATED.3IONS-SCNC: 14 MMOL/L (ref 7–15)
BUN SERPL-MCNC: 69.9 MG/DL (ref 6–20)
CALCIUM SERPL-MCNC: 10.3 MG/DL (ref 8.6–10)
CHLORIDE SERPL-SCNC: 97 MMOL/L (ref 98–107)
CREAT SERPL-MCNC: 5.62 MG/DL (ref 0.67–1.17)
DEPRECATED HCO3 PLAS-SCNC: 25 MMOL/L (ref 22–29)
GFR SERPL CREATININE-BSD FRML MDRD: 11 ML/MIN/1.73M2
GLUCOSE SERPL-MCNC: 124 MG/DL (ref 70–99)
MAGNESIUM SERPL-MCNC: 2 MG/DL (ref 1.7–2.3)
PHOSPHATE SERPL-MCNC: 5.5 MG/DL (ref 2.5–4.5)
POTASSIUM SERPL-SCNC: 4.2 MMOL/L (ref 3.4–5.3)
SODIUM SERPL-SCNC: 136 MMOL/L (ref 136–145)

## 2023-04-18 PROCEDURE — 250N000013 HC RX MED GY IP 250 OP 250 PS 637: Performed by: HOSPITALIST

## 2023-04-18 PROCEDURE — 97535 SELF CARE MNGMENT TRAINING: CPT | Mod: GO | Performed by: OCCUPATIONAL THERAPIST

## 2023-04-18 PROCEDURE — 250N000013 HC RX MED GY IP 250 OP 250 PS 637: Performed by: STUDENT IN AN ORGANIZED HEALTH CARE EDUCATION/TRAINING PROGRAM

## 2023-04-18 PROCEDURE — 250N000013 HC RX MED GY IP 250 OP 250 PS 637: Performed by: PSYCHIATRY & NEUROLOGY

## 2023-04-18 PROCEDURE — 82542 COL CHROMOTOGRAPHY QUAL/QUAN: CPT | Performed by: INTERNAL MEDICINE

## 2023-04-18 PROCEDURE — 80069 RENAL FUNCTION PANEL: CPT | Performed by: STUDENT IN AN ORGANIZED HEALTH CARE EDUCATION/TRAINING PROGRAM

## 2023-04-18 PROCEDURE — 36415 COLL VENOUS BLD VENIPUNCTURE: CPT | Performed by: HOSPITALIST

## 2023-04-18 PROCEDURE — 36415 COLL VENOUS BLD VENIPUNCTURE: CPT | Performed by: INTERNAL MEDICINE

## 2023-04-18 PROCEDURE — 97530 THERAPEUTIC ACTIVITIES: CPT | Mod: GP

## 2023-04-18 PROCEDURE — 250N000012 HC RX MED GY IP 250 OP 636 PS 637: Performed by: STUDENT IN AN ORGANIZED HEALTH CARE EDUCATION/TRAINING PROGRAM

## 2023-04-18 PROCEDURE — 250N000013 HC RX MED GY IP 250 OP 250 PS 637

## 2023-04-18 PROCEDURE — 83735 ASSAY OF MAGNESIUM: CPT | Performed by: HOSPITALIST

## 2023-04-18 PROCEDURE — 120N000001 HC R&B MED SURG/OB

## 2023-04-18 PROCEDURE — 97166 OT EVAL MOD COMPLEX 45 MIN: CPT | Mod: GO | Performed by: OCCUPATIONAL THERAPIST

## 2023-04-18 PROCEDURE — 99232 SBSQ HOSP IP/OBS MODERATE 35: CPT | Performed by: HOSPITALIST

## 2023-04-18 PROCEDURE — 97116 GAIT TRAINING THERAPY: CPT | Mod: GP

## 2023-04-18 RX ADMIN — LACOSAMIDE 100 MG: 100 TABLET, FILM COATED ORAL at 11:17

## 2023-04-18 RX ADMIN — PANTOPRAZOLE SODIUM 40 MG: 40 TABLET, DELAYED RELEASE ORAL at 08:31

## 2023-04-18 RX ADMIN — Medication 1 CAPSULE: at 08:30

## 2023-04-18 RX ADMIN — LACOSAMIDE 100 MG: 100 TABLET, FILM COATED ORAL at 22:03

## 2023-04-18 RX ADMIN — LACTULOSE 10 G: 10 SOLUTION ORAL at 20:22

## 2023-04-18 RX ADMIN — APIXABAN 5 MG: 5 TABLET, FILM COATED ORAL at 08:30

## 2023-04-18 RX ADMIN — RIFAXIMIN 550 MG: 550 TABLET ORAL at 08:30

## 2023-04-18 RX ADMIN — MIDODRINE HYDROCHLORIDE 10 MG: 5 TABLET ORAL at 08:30

## 2023-04-18 RX ADMIN — FOLIC ACID 1 MG: 1 TABLET ORAL at 08:30

## 2023-04-18 RX ADMIN — OLANZAPINE 2.5 MG: 5 TABLET, ORALLY DISINTEGRATING ORAL at 22:03

## 2023-04-18 RX ADMIN — SEVELAMER CARBONATE 800 MG: 800 TABLET, FILM COATED ORAL at 11:17

## 2023-04-18 RX ADMIN — MIDODRINE HYDROCHLORIDE 10 MG: 5 TABLET ORAL at 17:57

## 2023-04-18 RX ADMIN — SEVELAMER CARBONATE 800 MG: 800 TABLET, FILM COATED ORAL at 17:57

## 2023-04-18 RX ADMIN — TRAZODONE HYDROCHLORIDE 25 MG: 50 TABLET ORAL at 20:15

## 2023-04-18 RX ADMIN — ROPINIROLE HYDROCHLORIDE 0.5 MG: 0.25 TABLET, FILM COATED ORAL at 22:04

## 2023-04-18 RX ADMIN — SEVELAMER CARBONATE 800 MG: 800 TABLET, FILM COATED ORAL at 08:30

## 2023-04-18 RX ADMIN — LEVETIRACETAM 1000 MG: 500 TABLET, FILM COATED ORAL at 20:17

## 2023-04-18 RX ADMIN — RAMELTEON 8 MG: 8 TABLET, FILM COATED ORAL at 20:15

## 2023-04-18 RX ADMIN — LACTULOSE 10 G: 10 SOLUTION ORAL at 08:29

## 2023-04-18 RX ADMIN — ACETAMINOPHEN 650 MG: 325 TABLET, FILM COATED ORAL at 20:13

## 2023-04-18 RX ADMIN — TACROLIMUS 1 MG: 1 CAPSULE ORAL at 20:16

## 2023-04-18 RX ADMIN — APIXABAN 5 MG: 5 TABLET, FILM COATED ORAL at 20:15

## 2023-04-18 RX ADMIN — MIDODRINE HYDROCHLORIDE 10 MG: 5 TABLET ORAL at 11:17

## 2023-04-18 RX ADMIN — MELATONIN 5 MG TABLET 10 MG: at 22:03

## 2023-04-18 RX ADMIN — TACROLIMUS 1 MG: 1 CAPSULE ORAL at 08:30

## 2023-04-18 RX ADMIN — RIFAXIMIN 550 MG: 550 TABLET ORAL at 20:16

## 2023-04-18 ASSESSMENT — ACTIVITIES OF DAILY LIVING (ADL)
ADLS_ACUITY_SCORE: 48
ADLS_ACUITY_SCORE: 48
ADLS_ACUITY_SCORE: 49
ADLS_ACUITY_SCORE: 49
ADLS_ACUITY_SCORE: 48
ADLS_ACUITY_SCORE: 48
ADLS_ACUITY_SCORE: 49
ADLS_ACUITY_SCORE: 48
ADLS_ACUITY_SCORE: 46
ADLS_ACUITY_SCORE: 48

## 2023-04-18 NOTE — PROGRESS NOTES
04/18/23 1330   Appointment Info   Signing Clinician's Name / Credentials (OT) Marcia Hensley OTR/L   Living Environment   People in Home alone   Current Living Arrangements house;condominium   Home Accessibility no concerns   Transportation Anticipated health plan transportation   Living Environment Comments pt's home is being renovated; wife and dad also have homes/condos   Self-Care   Usual Activity Tolerance excellent   Current Activity Tolerance moderate   Regular Exercise Yes   Activity/Exercise Type biking;strength training   Equipment Currently Used at Home none   Activity/Exercise/Self-Care Comment pt worked full time prior to fall 2022; wife is an RN   Instrumental Activities of Daily Living (IADL)   IADL Comments pt was indep with all I/ADLs prior to fall 2022   General Information   Onset of Illness/Injury or Date of Surgery 04/17/23   Referring Physician Светлана Noble   Patient/Family Therapy Goal Statement (OT) rehab   Additional Occupational Profile Info/Pertinent History of Current Problem 57 year-old male admitted on 1/21/2023 as a transfer from [facility] for evaluation of loculated pleural effusion. He has extensive PMH including h/o liver transplant 2016 due to alcohol abuse, pAF on chronic anticoagulation, history of diabetes mellitus type 2, CVA, hypertension, CHRISTIAN on BiPAP.  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 He was recovering in [facility] and was transferred to [facility] for consideration of chest tube placement and possible intrapleural lysis for worsening effusion.   Existing Precautions/Restrictions fall   Left Upper Extremity (Weight-bearing Status) partial weight-bearing (PWB)  (10#)   Right Upper Extremity (Weight-bearing Status) full weight-bearing (FWB)   Left Lower  Extremity (Weight-bearing Status) full weight-bearing (FWB)   Right Lower Extremity (Weight-bearing Status) full weight-bearing (FWB)   Cognitive Status Examination   Orientation Status person;place   Affect/Mental Status (Cognitive) WFL   Follows Commands follows one-step commands;75-90% accuracy   Memory Deficit moderate deficit   Cognitive Screens/Assessments   Cognitive Assessments Completed M-ACE   Mini-[place]oke s Cognitive Examination (M-ACE) Total Score (out of 30)  20/30   M-ACE Norms A score of 21 or less indicates suspected cognitive impairment   M-ACE Domains Assessed Cognitive Screen for Attention, Memory, Fluency,  Visuospatial Abilities   M-ACE Interpretation Attn 3/4, Memory 7/7, Fluency 2/7, Clock 1/5, Recall 7/7   Pain Assessment   Patient Currently in Pain Yes, see Vital Sign flowsheet  (LBP)   Posture   Posture protracted shoulders   Range of Motion Comprehensive   Comment, General Range of Motion BUE WFL, except B shd limited to ~80*   Strength Comprehensive (MMT)   Comment, General Manual Muscle Testing (MMT) Assessment BUE WFL   Coordination   Coordination Comments intact B opposition; shaky clock drawing   Bed Mobility   Sit-Supine [place] (Bed Mobility) contact guard   Transfers   Transfers toilet transfer;sit-stand transfer;shower transfer   Sit-Stand Transfer   Sit-Stand [place] (Transfers) contact guard   Shower Transfer   [place] Level (Shower Transfer) minimum assist (75% patient effort)   Shower Transfer Comments did not observe   Toilet Transfer   Type (Toilet Transfer) sit-stand;stand-sit   [place] Level (Toilet Transfer) contact guard;verbal cues   Assistive Device (Toilet Transfer) commode chair;walker, front-wheeled   Balance   Balance Comments good seated; unsteady standing EOB   Activities of Daily Living   BADL Assessment/Intervention lower body dressing;grooming;toileting   Lower Body Dressing Assessment/Training   [place] Level (Lower Body Dressing)  minimum assist (75% patient effort)   Comment, (Lower Body Dressing) able to thread using figure-4, A for balance with pulling up over hips   Grooming Assessment/Training   [place] Level (Grooming) contact guard assist   Toileting   [place] Level (Toileting) minimum assist (75% patient effort)   Clinical Impression   Criteria for Skilled Therapeutic Interventions Met (OT) Yes, treatment indicated   OT Diagnosis impaired ADLs   OT Problem List-Impairments impacting ADL problems related to;activity tolerance impaired;balance;cognition;coordination;range of motion (ROM);strength;pain   Assessment of Occupational Performance 3-5 Performance Deficits   Identified Performance Deficits dressing, toileting, shower transfer, med management, home chores   Planned Therapy Interventions (OT) ADL retraining;cognition;transfer training;strengthening;home program guidelines   Clinical Decision Making Complexity (OT) moderate complexity   Risk & Benefits of therapy have been explained evaluation/treatment results reviewed;patient;spouse/significant other   OT Total Evaluation Time   OT Eval, Moderate Complexity Minutes (43865) 15   OT Goals   Therapy Frequency (OT) 5 times/wk   OT Predicted Duration/Target Date for Goal Attainment 04/25/23   OT Goals Hygiene/Grooming;Lower Body Dressing;Transfers;Toilet Transfer/Toileting;Cognition;OT Goal 1   OT: Hygiene/Grooming modified independent   OT: Lower Body Dressing Modified independent   OT: Transfer Supervision/stand-by assist;with assistive device   OT: Toilet Transfer/Toileting Modified independent;toilet transfer;cleaning and garment management   OT: Cognitive Patient/caregiver will verbalize understanding of cognitive assessment results/recommendations as needed for safe discharge planning   OT: Goal 1 Pt will participate in 10-12 mins of UE ex to increase ROM, strength, coordination in prep for ADLs   Interventions   Interventions Quick Adds Self-Care/Home  Management;Therapeutic Activity   OT Discharge Planning   OT Plan OT: ADLs standing at sink, incl retrieval of items; memory h/o issue and review   OT Discharge Recommendation (DC Rec) Transitional Care Facility   OT Rationale for DC Rec Pt below baseline, limited by weakness, impaired cognition, decreased balance.  He would benefit from continued therapy to maximize safety and independence with I/ADLs. At this time pt would need 24/7 supervision to return to home, due to cognitive and balance deficits.  If family able to provide this, would recommend HHOT or OP OT to continue with cognitive evaluation and training.   OT Brief overview of current status MACE 20/30, CGA/Kelin ADLs   Total Session Time   Total Session Time (sum of timed and untimed services) 15

## 2023-04-18 NOTE — PLAN OF CARE
Goal Outcome Evaluation:    DATE & TIME: 4/17 [id number]  Cognitive Concerns/ Orientation : A&O x3-4, calm & cooperative. Forgetful   BEHAVIOR & AGGRESSION TOOL COLOR: Green   ABNL VS/O2: VSS on RA  MOBILITY: A x1 GB/W. Pt and family visiting at bedside and utilizing ambulating assistive devices  PAIN MANAGMENT: c/o back pain received tylenol x1 - received melatonin as well  DIET: Regular, mildly thick. Good appetite  BOWEL/BLADDER: Continent.  2 BM this shift  ABNL LAB/BG: Cr 5.55   DRAIN/DEVICES: R chest HD CVC, PEG tube clamped, L arm fistula  SKIN: Scattered bruising  TESTS/PROCEDURES: HD MWF  D/C DATE: Discharge to TCU pending placement  OTHER IMPORTANT INFO: zyprexa dose adjusted per psych

## 2023-04-18 NOTE — PLAN OF CARE
DATE & TIME: 4/17/23 [id number]    Cognitive Concerns/ Orientation : Pt A/Ox3, disoriented to situation.   BEHAVIOR & AGGRESSION TOOL COLOR: Green   ABNL VS/O2: VSS on RA  MOBILITY: Assist x1 with gait belt and walker, fall risk. Repositions self in bed.  PAIN MANAGMENT: Complaints of back pain, managed with PRN Tylenol  DIET: Regular  BOWEL/BLADDER: Continent  ABNL LAB/BG: Na 135/Cr 6.70/Ca 10.4/Phos 6.6  DRAIN/DEVICES: R chest HD CVC, PEG tube clamped, L arm fistula  SKIN: Scattered bruising  TESTS/PROCEDURES: Dialysis MWF  D/C DATE: Discharge to TCU pending placement  OTHER IMPORTANT INFO: Pt having difficulty sleeping overnight. Pt was able to get a couple hours intermittently throughout the night.

## 2023-04-18 NOTE — PROGRESS NOTES
[facility]  Medicine Progress Note - Hospitalist Service    Date of Admission:  1/21/2023    Assessment & Plan     Blanco Osborne is a 57 year-old male admitted on 1/21/2023 as a transfer from [facility] for evaluation of loculated pleural effusion. He has extensive PMH including h/o liver transplant 2016 due to alcohol abuse, pAF on chronic anticoagulation, history of diabetes mellitus type 2, CVA, hypertension, CHRISTIAN on BiPAP.  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 He was recovering in [facility] and was transferred to [facility] for consideration of chest tube placement and possible intrapleural lysis for worsening effusion.     Acute metabolic encephalopathy with delirium, multifactorial, -resolved  Hepatic encephalopathy  Chronic liver disease, history of liver transplant 2016  End-stage renal disease (ESRD), on hemodialysis (HD)  History of seizure, on Keppra and Vimpat  Waxing and waning mentation, coinciding with lactulose being held at [facility]  Earlier in hospital stay, remained confused and had pulled out tracheostomy tube, PICC line and pulled his dialysis catheter.  Lines replaced.  Psychiatry consulted, recent follow-up 2/21, 2/28, see notes.    Mental status continues to fluctuate with periods of alertness and increased sleepiness, overall improving.    Complicated, prolonged hospital course with mental status concerns multifactorial related to hepatic encephalopathy, end-stage renal disease on dialysis, past PEA arrest, seizure disorder, medication and chronic liver disease with prior liver transplant.  has been off sitter currently.    Continue to reorient and redirect as able.  Avoid restraints if possible with goals of safety and close supervision (given multiple  lines and tubes - dialysis catheter, PEG tube etc).    Maintain normal day/night cycles and sleep-wake cycles.  Minimize sedating medications as able.  Remains weak and deconditioned with complex, prolonged hospital course and limited mobility.  Encouragement and support offered daily to patient.    Continue Lactulose to 10 mg BID, monitor for symptoms; goal to have 2-3 bowel movements per day.    Ongoing hemodialysis, as per nephrology, 3X/week dialysis schedule. Nephrology following    Continue ramelteon 8 mg at bedtime; monitor for side effects including AM sedation, reassess daily.    Started on olanzapine 5 mg BID and PRN earlier during this hospitalization. Delirium improved.  Follow-up evaluation by psychiatry and dose has been further adjusted.  Changed to 2.5 Mg at bedtime and also during dialysis.  Reviewed with spouse and trazodone 25 Mg added at bedtime.    Seizure disorder stable, continue Keppra and Vimpat. No recurrent seizures reported of recent.     Neurology consult appreciated.  Patient has been started on ropinirole for restless leg syndrome.     Bilateral pleural effusions   Acute respiratory failure requiring tracheostomy  - resolved    Pneumonia  - resolved   ARDS  - resolved    Right pneumothorax - resolved   *Initial event was parainfluenza and strep pneumo pneumonia back in November 2022. Treated for ARDS. Had failed extubation x2 and tracheostomy performed on previous hospitalization. *Had additional complications of bilateral pneumothoraces as well as hydropneumothorax- pleural fluid grew candida parapsilosis  *Completed 4-week antifungal treatment. While in [facility] he was successfully weaned from the ventilator.  *Recently there were concern for worsening effusion while at [facility] and had thoracentesis 01/13 which returned exudative without growth on cultures. CT chest/abdomen/pelvis on 01/18 demonstrated worsening effusions and concerns for infected parapneumonic effusions with  possible SBP.  Multiple pulmonologist at [facility] reviewed CT scan recommended transfer to [facility] for consideration of chest tube placement and possible intrapleural lysis  *Thoracic surgery (Dr. Jackson) consulted during admission. CT chest 1/22, small effusions on imaging and so chest tube was not inserted.   * ID consulted, see note 2/10.  *Patient pulled out his tracheostomy tube on the night 2/1-2/2 and is tolerating well without increased shortness of breath persistent cough or worsening hypoxia.  * Chest x-ray 2/3 with cardiomegaly, chronic small bilateral pleural effusions, no pulmonary edema; right internal jugular dialysis catheter in place.   * 3/12 Pulmonary consult, see note, concerns of hypercapnia, atelectasis, with consideration of BiPAP trial; no recommendations for antibiotics with infiltrates felt to be atelectasis.    Stable on room air now; occasional cough reported.    Encourage incentive spirometry as able, up to chair, deep breathing.    Wound care previously consulted for tracheostomy site care, monitor for signs and symptoms of infection, healing well - resolved.    Encourage ambulation.     Possible splenic infract  Wedge shaped area on CT scan chest and CT abdomen on 3/7/23.    Hematology consulted, suspicion for infarct was low.    TTE no thrombus or vegetations.    No abdominal pain CTM.     S/p liver transplant 2016   Chronic immunosuppression, on tacrolimus  Cirrhosis with ascites  Subacute bacterial peritonitis (SBP), resolved  *Main manifestations of this are hepatic encephalopathy and ascites.  Hepatologist at [facility] felt that most likely reason for the cirrhosis was metabolic syndrome or alcohol intake.  There was no evidence of rejection on biopsy and there was some evidence of regeneration. Paracentesis done on 12/22/2022 showed lactobacillus indicating SBP, treated with Unasyn and Augmentin, finished 2-week course 1/12/2023.  Requires large-volume paracentesis periodically  "for recurring ascites.    *Paracentesis 1/13/2023 yielded about 7500 cc. Cultures NGTD. paracentesis on 1/24 with 4.8 L fluid removal  Paracentesis on 3/6/23 No SBP    Continue intermittent paracentesis as needed if develops symptomatic ascites.    Monitor for signs and symptoms of recurrent spontaneous bacterial peritonitis.    Further treatment for recurrent ascites with TIPS deferred to his Liver Transplant team and/or Hepatology, he has an appointment on 4/21/2023 with Hepatology, Dr. Simms.    Continue Tacrolimus 1 mg BID, rifaximin 550 mg BID.    Continue lactulose as ordered, titrate as needed to have 2-3 bms.    GI consult as needed in hospital for new acute issues, otherwise as outpatient.    Encourgae high protein diet.    CT abdomen on March 9, 2023 showed small to moderate ascites.     Goals of care   As per prior rounding provider, \"on 1/25 nursing had mentioned that patient had refused to undergo dialysis and want to stop care, palliative care was consulted.  Patient and his spouse denied wanting to stop care and wanted ongoing restorative care including full code\"    Full Code status.    Needs placement to TCU -> SW working on placement. Discussed.      Diarrhea, improved, on lactulose for chronic liver disease    C difficile negative on 1/31. Secondary to lactulose. Discontinued rectal tube (2/12) as stools more formed.    Continue lactulose with goal of 2 to 3 soft bowel movement in 24 hours.     Paroxysmal atrial fibrillation  Chronic anticoagulation, apixaban  History of embolic strokes  Remains in sinus rhythm, on anticoagulation with apixaban indefinitely for stroke prophylaxis.      Monitor rhythm.    Continue apixaban anticoagulation, it was briefly held for fistula placement.    Monitor hemoglobin, see below.     Acute anemia  Patient has required intermittent transfusions for on-going anemia.  Anemia most likely secondary to critical illness/chronic disease, chronic renal disease.  Baseline " hemoglobin around 7-8.  Likely Epo resistance.    Hemoglobin stable at 8    Monitor intermittently.     Hypercalcemia  Work-up shows low/low normal vitamin D, suppressed PTH.  But PTH related peptide is elevated.    Nephrology following and considered prolia, however patient's wife would like to hold off for now.    Holding VIT D and Phoslo.     Discussed with Dr Moulton regarding elevated PTHrP, suspected due to prolonged immobility. Plan is to interval follow up in a month or so. If trends up, then will plan forther work up.      History PEA arrest   PEA arrest prior to his previous admission and 1 episode of PEA associated with desaturation on 12/20.  No structural cardiac disease.      Chronic Hypotension  In the past has developed baseline hypotension with cirrhosis and prolonged critical illness.  On hemodialysis.  Receiving midodrine and albumin during dialysis.    Continue midodrine       History of seizure   After hypoxic event, in the context of baseline multifactorial encephalopathy secondary to his ongoing critical illness and prior cardiac arrests and prior strokes and cirrhosis with hepatic encephalopathy. MRI of head of 12/20/22 reveals no PRES or leptomeningeal enhancement but scattered.  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   No recent seizure activity.    Continue levetiracetam, lacosamide.    Seizure precautions.    Outpatient follow-up with neurology      ESRD  Anemia due to renal disease  Hypotension during dialysis  Hyperphosphatemia    Nephrology reviewing vein mapping to assess options for internal access placement for dialysis, see note 3/16.    Underwent Fistula placement on  3/21/22.    Vascular surgery following-continue to use for tunnel catheter.  Outpatient follow-up with vascular surgery and for will need a follow-up ultrasound of the fistula in 6 weeks to assess patency.    Started sevalmer 800mg TID with meals on  3/27/23.    Complained of cramping with dialysis. Tried a dose of oxycodone prior to dialysis but this was not helpful. He will discuss the cramping with nephrology. Spouse prefers to avoid narcotic given encephalopathy.      Diabetes mellitus type 2  *Hemoglobin A1c on November 2022 was 4.7  *By report, on Lantus insulin in the past.  Blood sugar checks and sliding scale insulin previously discontinued as has been stable without insulin needs.    Monitor with periodic blood sugar checks as needed.     Hypomagnesemia    Resolved with replacement.     Severe malnutrition, protein and calorie type  Moderate dysphagia  G-tube feedings    Continue with tube feeds via PEG tube.    IR replaced the PEG tube on 2/8/2023, monitor.    Nutritionist consulted and following for tube feeds.    SLP following as well. Oral diet as per speech and language therapy (SLP). Patient hopeful to advance to thin liquids soon.    2/20 Nutrition following for tube feeds.    Now undergoing PRN tube feeds.    Encourage high-protein diet.    Nutrition notes reviewed, patient eating 0 to 100% of meals.  Continue current interventions.  As needed tube feed boluses available if patient consumes less than 50% of the meal.    Family encouraging patient to eat high protein diet including protein bars    Family asking about g tube removal. Per the EMR, last tube feed bolus was 3/22 if adequately documented. He refuses his tube feeds often per the dietitician. At this point, would favor keeping it in place given overall poor prognosis with ESRD, cirrhosis of transplanted liver, persistent encephalopathy, and inadequate oral intake. Event if he refuses, he is at risk for developing new infection or other decompensation and thus g tube as safety net.      Anxiety  Fluoxetine was discontinued as per psychiatry recommendation on 2/2 (see consult note for details).    Stable, monitor; comfort and reassurance offered during visit.    Psychiatry  follow-up.     Restless leg  Syndrome    Patient started on ropiranole by neurology.       Diet: Snacks/Supplements Adult: Other; Gelatein+ with brkfst and lunch, and magic cup with dinner (RD); With Meals  Combination Diet Regular Diet; Mildly Thick (level 2) (OK for fresh fruit (e.g., pineapple) per SLP)  Adult Formula Bolus Feeding: Novasource Renal; Route: Gastrostomy; 3 times daily; Volume per Bolus: 240; mL(s); Back up bolus for when pt consumes <50% of meals: 80 mL/hr x 3 hrs  Room Service    DVT Prophylaxis: DOAC  Nixon Catheter: Not present  Lines: PRESENT      CVC Double Lumen Right External jugular Tunneled-Site Assessment: WDL  Hemodialysis Vascular Access Arteriovenous fistula Left Forearm-Site Assessment: WDL      Cardiac Monitoring: None  Code Status: Full Code      Clinically Significant Risk Factors           # Hypercalcemia: Highest Ca = 10.4 mg/dL in last 2 days, will monitor as appropriate    # Hypoalbuminemia: Lowest albumin = 1.9 g/dL at 1/23/2023  6:38 AM, will monitor as appropriate            # Severe Malnutrition: based on nutrition assessment        Disposition Plan      Expected Discharge Date: 04/26/2023, 12:00 PM  Discharge Delays: Placement - LTC  Destination: inpatient rehabilitation facility  Discharge Comments: Dialysis pt MWF. Will need placement TCU/LTC. [facility] willing to accept once no sitter, SW to follow up when sitter free          Светлана Noble MD  Hospitalist Service  [facility]  Securely message with [employer] (more info)  Text page via [employer] Paging/Directory   ______________________________________________________________________    Interval History     Discussed with Rn and evaluated patient this morning. Spouse in the room. Stated patient did not sleep very good last night and has been sleeping on off this morning. States back is sore.   - no nausea vomiting or pain abd.    - wife stated he has occasional cough with eating, concerned if  silent aspiration    Physical Exam   Vital Signs: Temp: 97.4  F (36.3  C) Temp src: Oral BP: 99/57 Pulse: 70   Resp: 18 SpO2: 95 % O2 Device: None (Room air)    Weight: 177 lbs 3.2 oz    General: AAOx 3 today, appears comfortable.  HEENT: PERRLA EOMI. Mucosa moist.   Lungs: Bilateral equal air entry. Clear to auscultation, normal work of breathing.   CVS: S1S2 regular, no tachycardia or murmur.   Abdomen: Soft, NT, ND. BS heard.  MSK: No edema or deformities.  Neuro:  CN 2-12 normal. Strength symmetrical.  Skin: No rash.       Medical Decision Making       40 MINUTES SPENT BY ME on the date of service doing chart review, history, exam, documentation & further activities per the note.      Data     I have personally reviewed the following data over the past 24 hrs:    N/A  \   N/A   / N/A     136 97 (L) 69.9 (H) /  124 (H)   4.2 25 5.62 (H) \       ALT: N/A AST: N/A AP: N/A TBILI: N/A   ALB: 3.7 TOT PROTEIN: N/A LIPASE: N/A       Imaging results reviewed over the past 24 hrs:   No results found for this or any previous visit (from the past 24 hour(s)).

## 2023-04-18 NOTE — PROGRESS NOTES
Planning HD tomorrow.  As PTH-rP was elevated last month on 3/10, I will order another level to see if rising.      Kb Moulton MD

## 2023-04-18 NOTE — PROGRESS NOTES
CLINICAL NUTRITION SERVICES - REASSESSMENT NOTE    Recommendations Ordered by Registered Dietitian (RD):   - continue regular diet (modifications per SLP; renal diet restrictions deferred to promote adequate PO intake) + oral nutrition supplements + room service assistance as ordered (noted request for breakfast as early as possible on dialysis days)  - Family may bring in meals/snacks as desired, small/frequent meals enouraged.     - TF formula available for back-up boluses.   Novasource Renal at 80 mL/hr x 3 hours PRN back up bolus if consumes <50%.   Per Bolus Provisions: 240 mL formula = 480 kcal, 22 g protein, 44 g CHO, 0 g fiber and 172 mL free water  Free Water Flush: 60 mL before and after each feeding  Noted plan to keep G tube in place for potential decompensation - defer to provider team.     Malnutrition (3/16)  % Weight Loss:  > 5% in 1 month (severe malnutrition)   % Intake:  No longer applies   Subcutaneous Fat Loss:  Orbital region moderate depletion, Upper arm region moderate-severe depletion and Thoracic region moderate-severe depletion  Muscle Loss:  Temporal region moderate depletion, Clavicle bone region severe depletion, Acromion bone region severe depletion, Patellar region moderate depletion and Anterior thigh region moderate-severe depletion  Fluid Retention:  Trace     Malnutrition Diagnosis: Severe malnutrition  In Context of:  Acute on Chronic illness or disease     EVALUATION OF PROGRESS TOWARD GOALS   Diet: Regular + Mildly thick liquids; fresh fruit per SLP ok + free water protocol  Oral nutrition supplements: gelatain plus at breakfast and lunch; Magic cup with dinner  M, W, F needs early breakfast  Receiving assistance in ordering meals.    Nutrition Support:   Back up bolus only (Has not been utilized - G tube clamped). Novasource Renal at 80 mL/hr x 3 hours PRN back up bolus if consumes <50%.   Per Bolus Provisions: 240 mL formula = 480 kcal, 22 g protein, 44 g CHO, 0 g fiber and  172 mL free water  Free Water Flush: 60 mL before and after each feeding  Tube flushing for maintenance per nursing protocols  Ongoing discussion of pros/cons of G tube removal per provider documentation with consideration of complex medical conditions that may hinder PO ability if future setbacks arise.    Intake/Tolerance:   % intake for majority of documented meals with good appetite per flow sheet review (a few meals of ~50% intake)    ASSESSED NUTRITION NEEDS:  Dosing Weight: 76.1 kg   Estimated Energy Needs: 9404-9798+ kcals (25-30+ Kcal/Kg)  Justification: dialysis  Estimated Protein Needs: + grams protein (1.2-1.5+ g pro/Kg)  Justification: dialysis    NEW FINDINGS:   - Labs: reviewed; consistent with ESK on HD (hyponatremia, hyperphosphatemia, elevated BUN and Cr)  - Weight trends: 80.4 kg 4/18; Lowest wt on file measured 3/29 (79.1 kg). Fluctuations in fluid status in setting of dialysis impacting ability to fully assess dry wt.   - Fluid: trace generalized, dependent, BUE and BLE edema  - Stooling: frequent BM's per day ongoing (3-6x per day)  - Meds:   Folic Acid 1 mg   Lactulose BID - titrate to 2-3 soft BMs (to be held for loose stools)  Nephrocaps  Renvela w/ meals   - discharge when TCU found vs discharge home?    Previous Goals 4/11:   Intake of >/=75% adequate meals on average.   Evaluation: Overall met    Previous Nutrition Diagnosis 4/11:   Malnutrition related to prolonged hospitalization in setting of chronic illness and poor prognosis per MD as evidenced by variable intakes   Evaluation: No change - updated to more chronic nutrition issue    CURRENT NUTRITION DIAGNOSIS  Malnutrition related to prolonged hospitalization in setting of chronic illness and poor prognosis per MD as evidenced by variable intakes with previous G tube reliance, increased needs with HD    INTERVENTIONS  Recommendations / Nutrition Prescription  - Continue supplements, Room service assistance. Deferred  addition on renal diet restrictions given repletion needs.     - TF bolus if patient eats <50% of meal. Flush 60 mL before and after bolus.     Implementation  No changes today     Goals  Intake of >/=75% adequate meals TID on average.     MONITORING AND EVALUATION:  Progress towards goals will be monitored and evaluated per protocol and Practice Guidelines      Kailey sIsa, MS, RDN-AP, LD, CNSC

## 2023-04-18 NOTE — PLAN OF CARE
Goal Outcome Evaluation:  DATE & TIME: 4/17/23 [id number]    Cognitive Concerns/ Orientation : Pt A/Ox2, disoriented to time and situation.   BEHAVIOR & AGGRESSION TOOL COLOR: Green   ABNL VS/O2: VSS on RA  MOBILITY: Assist x1 with gait belt and walker, fall risk. Repositions self in bed. Ambulated by the family in the hallway.   PAIN MANAGMENT: Complaints of back pain, Does not want any pain meds right now.   DIET: Regular  BOWEL/BLADDER: Continent  ABNL LAB/BG: Phos 5.5  DRAIN/DEVICES: R chest HD CVC, PEG tube clamped, L arm fistula  SKIN: Scattered bruising  TESTS/PROCEDURES: Dialysis MWF  D/C DATE: Discharge to TCU pending placement  OTHER IMPORTANT INFO: Pt having difficulty sleeping overnight. Pt calm and comfortable this shift.

## 2023-04-18 NOTE — PLAN OF CARE
Goal Outcome Evaluation:     DATE & TIME: 4/18/23               Cognitive Concerns/ Orientation : A&Ox3, disoriented to situation.   BEHAVIOR & AGGRESSION TOOL COLOR: Green             ABNL VS/O2: VSS on RA  MOBILITY: Assist x1 with gait belt and walker, fall risk, frequent check.  PAIN MANAGMENT: Denied pain  DIET: Regular  BOWEL/BLADDER: Continent  ABNL LAB/BG:   DRAIN/DEVICES: R chest HD CVC, PEG tube clamped, dressing changed, L arm fistula  SKIN: Scattered bruising  TESTS/PROCEDURES: Dialysis MWF  D/C DATE: Discharge to TCU pending placement  OTHER IMPORTANT INFO: New order, VSq2, trazodone to help with sleep.

## 2023-04-19 ENCOUNTER — APPOINTMENT (OUTPATIENT)
Dept: SPEECH THERAPY | Facility: CLINIC | Age: 58
DRG: 981 | End: 2023-04-19
Attending: STUDENT IN AN ORGANIZED HEALTH CARE EDUCATION/TRAINING PROGRAM
Payer: COMMERCIAL

## 2023-04-19 LAB
ALBUMIN SERPL BCG-MCNC: 3.7 G/DL (ref 3.5–5.2)
ANION GAP SERPL CALCULATED.3IONS-SCNC: 11 MMOL/L (ref 7–15)
BUN SERPL-MCNC: 46.4 MG/DL (ref 6–20)
CALCIUM SERPL-MCNC: 9.4 MG/DL (ref 8.6–10)
CHLORIDE SERPL-SCNC: 99 MMOL/L (ref 98–107)
CREAT SERPL-MCNC: 4.11 MG/DL (ref 0.67–1.17)
DEPRECATED HCO3 PLAS-SCNC: 28 MMOL/L (ref 22–29)
GFR SERPL CREATININE-BSD FRML MDRD: 16 ML/MIN/1.73M2
GLUCOSE SERPL-MCNC: 124 MG/DL (ref 70–99)
PHOSPHATE SERPL-MCNC: 3.9 MG/DL (ref 2.5–4.5)
PHOSPHATE SERPL-MCNC: 3.9 MG/DL (ref 2.5–4.5)
POTASSIUM SERPL-SCNC: 4 MMOL/L (ref 3.4–5.3)
SODIUM SERPL-SCNC: 138 MMOL/L (ref 136–145)

## 2023-04-19 PROCEDURE — 250N000013 HC RX MED GY IP 250 OP 250 PS 637: Performed by: STUDENT IN AN ORGANIZED HEALTH CARE EDUCATION/TRAINING PROGRAM

## 2023-04-19 PROCEDURE — 90937 HEMODIALYSIS REPEATED EVAL: CPT

## 2023-04-19 PROCEDURE — 250N000013 HC RX MED GY IP 250 OP 250 PS 637: Performed by: HOSPITALIST

## 2023-04-19 PROCEDURE — 634N000001 HC RX 634: Performed by: INTERNAL MEDICINE

## 2023-04-19 PROCEDURE — 80069 RENAL FUNCTION PANEL: CPT | Performed by: STUDENT IN AN ORGANIZED HEALTH CARE EDUCATION/TRAINING PROGRAM

## 2023-04-19 PROCEDURE — 99232 SBSQ HOSP IP/OBS MODERATE 35: CPT | Performed by: HOSPITALIST

## 2023-04-19 PROCEDURE — 258N000003 HC RX IP 258 OP 636: Performed by: INTERNAL MEDICINE

## 2023-04-19 PROCEDURE — 250N000013 HC RX MED GY IP 250 OP 250 PS 637: Performed by: PSYCHIATRY & NEUROLOGY

## 2023-04-19 PROCEDURE — 92526 ORAL FUNCTION THERAPY: CPT | Mod: GN | Performed by: SPEECH-LANGUAGE PATHOLOGIST

## 2023-04-19 PROCEDURE — P9047 ALBUMIN (HUMAN), 25%, 50ML: HCPCS | Performed by: STUDENT IN AN ORGANIZED HEALTH CARE EDUCATION/TRAINING PROGRAM

## 2023-04-19 PROCEDURE — 250N000012 HC RX MED GY IP 250 OP 636 PS 637: Performed by: STUDENT IN AN ORGANIZED HEALTH CARE EDUCATION/TRAINING PROGRAM

## 2023-04-19 PROCEDURE — 36415 COLL VENOUS BLD VENIPUNCTURE: CPT | Performed by: STUDENT IN AN ORGANIZED HEALTH CARE EDUCATION/TRAINING PROGRAM

## 2023-04-19 PROCEDURE — 84100 ASSAY OF PHOSPHORUS: CPT | Performed by: STUDENT IN AN ORGANIZED HEALTH CARE EDUCATION/TRAINING PROGRAM

## 2023-04-19 PROCEDURE — 250N000011 HC RX IP 250 OP 636: Performed by: INTERNAL MEDICINE

## 2023-04-19 PROCEDURE — 120N000001 HC R&B MED SURG/OB

## 2023-04-19 PROCEDURE — 90935 HEMODIALYSIS ONE EVALUATION: CPT | Performed by: INTERNAL MEDICINE

## 2023-04-19 PROCEDURE — 250N000013 HC RX MED GY IP 250 OP 250 PS 637

## 2023-04-19 PROCEDURE — 250N000011 HC RX IP 250 OP 636: Performed by: STUDENT IN AN ORGANIZED HEALTH CARE EDUCATION/TRAINING PROGRAM

## 2023-04-19 RX ORDER — ALBUMIN (HUMAN) 12.5 G/50ML
12.5 SOLUTION INTRAVENOUS ONCE
Status: COMPLETED | OUTPATIENT
Start: 2023-04-19 | End: 2023-04-19

## 2023-04-19 RX ADMIN — ALBUMIN HUMAN 12.5 G: 0.25 SOLUTION INTRAVENOUS at 10:48

## 2023-04-19 RX ADMIN — HEPARIN SODIUM 1900 UNITS: 1000 INJECTION INTRAVENOUS; SUBCUTANEOUS at 10:50

## 2023-04-19 RX ADMIN — SEVELAMER CARBONATE 800 MG: 800 TABLET, FILM COATED ORAL at 07:55

## 2023-04-19 RX ADMIN — ACETAMINOPHEN 650 MG: 325 TABLET, FILM COATED ORAL at 07:47

## 2023-04-19 RX ADMIN — SEVELAMER CARBONATE 800 MG: 800 TABLET, FILM COATED ORAL at 12:27

## 2023-04-19 RX ADMIN — ROPINIROLE HYDROCHLORIDE 0.5 MG: 0.25 TABLET, FILM COATED ORAL at 20:41

## 2023-04-19 RX ADMIN — LACTULOSE 10 G: 10 SOLUTION ORAL at 20:45

## 2023-04-19 RX ADMIN — LACOSAMIDE 100 MG: 100 TABLET, FILM COATED ORAL at 12:27

## 2023-04-19 RX ADMIN — HEPARIN SODIUM 1900 UNITS: 1000 INJECTION INTRAVENOUS; SUBCUTANEOUS at 10:51

## 2023-04-19 RX ADMIN — Medication 1 CAPSULE: at 07:55

## 2023-04-19 RX ADMIN — TACROLIMUS 1 MG: 1 CAPSULE ORAL at 20:42

## 2023-04-19 RX ADMIN — RAMELTEON 8 MG: 8 TABLET, FILM COATED ORAL at 20:42

## 2023-04-19 RX ADMIN — MIDODRINE HYDROCHLORIDE 10 MG: 5 TABLET ORAL at 17:39

## 2023-04-19 RX ADMIN — SEVELAMER CARBONATE 800 MG: 800 TABLET, FILM COATED ORAL at 17:39

## 2023-04-19 RX ADMIN — SODIUM CHLORIDE 250 ML: 9 INJECTION, SOLUTION INTRAVENOUS at 10:50

## 2023-04-19 RX ADMIN — APIXABAN 5 MG: 5 TABLET, FILM COATED ORAL at 07:46

## 2023-04-19 RX ADMIN — MIDODRINE HYDROCHLORIDE 10 MG: 5 TABLET ORAL at 12:27

## 2023-04-19 RX ADMIN — OLANZAPINE 2.5 MG: 5 TABLET, ORALLY DISINTEGRATING ORAL at 20:42

## 2023-04-19 RX ADMIN — MIDODRINE HYDROCHLORIDE 10 MG: 5 TABLET ORAL at 07:46

## 2023-04-19 RX ADMIN — Medication 2.5 MG: at 07:55

## 2023-04-19 RX ADMIN — EPOETIN ALFA-EPBX 10000 UNITS: 10000 INJECTION, SOLUTION INTRAVENOUS; SUBCUTANEOUS at 10:53

## 2023-04-19 RX ADMIN — APIXABAN 5 MG: 5 TABLET, FILM COATED ORAL at 20:41

## 2023-04-19 RX ADMIN — TRAZODONE HYDROCHLORIDE 25 MG: 50 TABLET ORAL at 20:42

## 2023-04-19 RX ADMIN — LEVETIRACETAM 1000 MG: 500 TABLET, FILM COATED ORAL at 20:41

## 2023-04-19 RX ADMIN — TACROLIMUS 1 MG: 1 CAPSULE ORAL at 07:47

## 2023-04-19 RX ADMIN — RIFAXIMIN 550 MG: 550 TABLET ORAL at 07:46

## 2023-04-19 RX ADMIN — RIFAXIMIN 550 MG: 550 TABLET ORAL at 20:40

## 2023-04-19 RX ADMIN — SODIUM CHLORIDE 300 ML: 9 INJECTION, SOLUTION INTRAVENOUS at 10:49

## 2023-04-19 RX ADMIN — LACOSAMIDE 100 MG: 100 TABLET, FILM COATED ORAL at 20:41

## 2023-04-19 RX ADMIN — FOLIC ACID 1 MG: 1 TABLET ORAL at 07:47

## 2023-04-19 RX ADMIN — PANTOPRAZOLE SODIUM 40 MG: 40 TABLET, DELAYED RELEASE ORAL at 07:46

## 2023-04-19 RX ADMIN — LACTULOSE 10 G: 10 SOLUTION ORAL at 07:55

## 2023-04-19 ASSESSMENT — ACTIVITIES OF DAILY LIVING (ADL)
ADLS_ACUITY_SCORE: 48

## 2023-04-19 NOTE — PLAN OF CARE
Goal Outcome Evaluation:         DATE & TIME: 4/19/23 [id number]  SUMMARY: encephalopathy, ETOH hx, cirrhosis  Cognitive Concerns/ Orientation : Pt A/Ox2, disoriented to time and situation.   BEHAVIOR & AGGRESSION TOOL COLOR: Green   ABNL VS/O2: VSS on RA  MOBILITY: Assist x1 with gait belt and walker, fall risk. Repositions self in bed.  PAIN MANAGMENT: Complaints of back pain, PRN tylenol x1 - effective.  DIET: Regular (bolus feedings if <50% meals) -- PO intake tolerated well this shift  BOWEL/BLADDER: Continent B&B  ABNL LAB/BG: none this shift  DRAIN/DEVICES: R chest HD CVC, PEG tube clamped, L arm hemodialysis fistula  SKIN: Scattered bruising and peeling of feet; blanchable redness on sacrum  TESTS/PROCEDURES: Dialysis MWF  D/C DATE: Discharge to TCU pending placement  OTHER IMPORTANT INFO: Encourage pt to sleep at night and stay awake during daytime per MD

## 2023-04-19 NOTE — PROGRESS NOTES
[facility]  Medicine Progress Note - Hospitalist Service    Date of Admission:  1/21/2023    Assessment & Plan     Blanco Osborne is a 57 year-old male admitted on 1/21/2023 as a transfer from [facility] for evaluation of loculated pleural effusion. He has extensive PMH including h/o liver transplant 2016 due to alcohol abuse, pAF on chronic anticoagulation, history of diabetes mellitus type 2, CVA, hypertension, CHRISTIAN on BiPAP.  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 He was recovering in [facility] and was transferred to [facility] for consideration of chest tube placement and possible intrapleural lysis for worsening effusion.     Acute metabolic encephalopathy with delirium, multifactorial, -resolved  Hepatic encephalopathy  Chronic liver disease, history of liver transplant 2016  End-stage renal disease (ESRD), on hemodialysis (HD)  History of seizure, on Keppra and Vimpat  Waxing and waning mentation, coinciding with lactulose being held at [facility]  Earlier in hospital stay, remained confused and had pulled out tracheostomy tube, PICC line and pulled his dialysis catheter.  Lines replaced.  Psychiatry consulted, recent follow-up 2/21, 2/28, see notes.    Mental status continues to fluctuate with periods of alertness and increased sleepiness, overall improving.    Complicated, prolonged hospital course with mental status concerns multifactorial related to hepatic encephalopathy, end-stage renal disease on dialysis, past PEA arrest, seizure disorder, medication and chronic liver disease with prior liver transplant.  has been off sitter currently.    Continue to reorient and redirect as able. Off restraints and sitter for several days.    Maintain normal day/night cycles and  sleep-wake cycles.  Minimize sedating medications as able.  Remains weak and deconditioned with complex, prolonged hospital course and limited mobility.  Encouragement and support offered daily to patient.    Continue Lactulose to 10 mg BID, monitor for symptoms; goal to have 2-3 bowel movements per day.    Ongoing hemodialysis, as per nephrology, 3X/week dialysis schedule. Nephrology following    Continue ramelteon 8 mg at bedtime; monitor for side effects including AM sedation, reassess daily.    Started on olanzapine 5 mg BID and PRN earlier during this hospitalization. Delirium improved.  Follow-up evaluation by psychiatry and dose has been further adjusted.  Changed to 2.5 Mg at bedtime and also during dialysis.  Reviewed with spouse and trazodone 25 Mg added at bedtime.    Seizure disorder stable, continue Keppra and Vimpat. No recurrent seizures reported of recent.     Neurology consult appreciated.  Patient has been started on ropinirole for restless leg syndrome.     Bilateral pleural effusions   Acute respiratory failure requiring tracheostomy  - resolved    Pneumonia  - resolved   ARDS  - resolved    Right pneumothorax - resolved   *Initial event was parainfluenza and strep pneumo pneumonia back in November 2022. Treated for ARDS. Had failed extubation x2 and tracheostomy performed on previous hospitalization. *Had additional complications of bilateral pneumothoraces as well as hydropneumothorax- pleural fluid grew candida parapsilosis  *Completed 4-week antifungal treatment. While in LTACH he was successfully weaned from the ventilator.  *Recently there were concern for worsening effusion while at [facility] and had thoracentesis 01/13 which returned exudative without growth on cultures. CT chest/abdomen/pelvis on 01/18 demonstrated worsening effusions and concerns for infected parapneumonic effusions with possible SBP.  Multiple pulmonologist at [facility] reviewed CT scan recommended transfer to [facility] for  consideration of chest tube placement and possible intrapleural lysis  *Thoracic surgery (Dr. Jackson) consulted during admission. CT chest 1/22, small effusions on imaging and so chest tube was not inserted.   * ID consulted, see note 2/10.  *Patient pulled out his tracheostomy tube on the night 2/1-2/2 and is tolerating well without increased shortness of breath persistent cough or worsening hypoxia.  * Chest x-ray 2/3 with cardiomegaly, chronic small bilateral pleural effusions, no pulmonary edema; right internal jugular dialysis catheter in place.   * 3/12 Pulmonary consult, see note, concerns of hypercapnia, atelectasis, with consideration of BiPAP trial; no recommendations for antibiotics with infiltrates felt to be atelectasis.    Stable on room air now; occasional cough reported.    Encourage incentive spirometry as able, up to chair, deep breathing.    Wound care previously consulted for tracheostomy site care, monitor for signs and symptoms of infection, healing well - resolved.    Encourage ambulation.     Possible splenic infract  Wedge shaped area on CT scan chest and CT abdomen on 3/7/23.    Hematology consulted, suspicion for infarct was low.    TTE no thrombus or vegetations.    No abdominal pain CTM.     S/p liver transplant 2016   Chronic immunosuppression, on tacrolimus  Cirrhosis with ascites  Subacute bacterial peritonitis (SBP), resolved  *Main manifestations of this are hepatic encephalopathy and ascites.  Hepatologist at [facility] felt that most likely reason for the cirrhosis was metabolic syndrome or alcohol intake.  There was no evidence of rejection on biopsy and there was some evidence of regeneration. Paracentesis done on 12/22/2022 showed lactobacillus indicating SBP, treated with Unasyn and Augmentin, finished 2-week course 1/12/2023.  Requires large-volume paracentesis periodically for recurring ascites.    *Paracentesis 1/13/2023 yielded about 7500 cc. Cultures NGTD. paracentesis  "on 1/24 with 4.8 L fluid removal  Paracentesis on 3/6/23 No SBP    Continue intermittent paracentesis as needed if develops symptomatic ascites.    Monitor for signs and symptoms of recurrent spontaneous bacterial peritonitis.    Further treatment for recurrent ascites with TIPS deferred to his Liver Transplant team and/or Hepatology, he has an appointment on 4/21/2023 with Hepatology, Dr. Simms.    Continue Tacrolimus 1 mg BID, rifaximin 550 mg BID.    Continue lactulose as ordered, titrate as needed to have 2-3 bms.    GI consult as needed in hospital for new acute issues, otherwise as outpatient.    Encourgae high protein diet.    CT abdomen on March 9, 2023 showed small to moderate ascites.     Goals of care   As per prior rounding provider, \"on 1/25 nursing had mentioned that patient had refused to undergo dialysis and want to stop care, palliative care was consulted.  Patient and his spouse denied wanting to stop care and wanted ongoing restorative care including full code\"    Full Code status.    Needs placement to TCU -> SW working on placement. Discussed.      Diarrhea, improved, on lactulose for chronic liver disease    C difficile negative on 1/31. Secondary to lactulose. Discontinued rectal tube (2/12) as stools more formed.    Continue lactulose with goal of 2 to 3 soft bowel movement in 24 hours.     Paroxysmal atrial fibrillation  Chronic anticoagulation, apixaban  History of embolic strokes  Remains in sinus rhythm, on anticoagulation with apixaban indefinitely for stroke prophylaxis.      Monitor rhythm.    Continue apixaban anticoagulation, it was briefly held for fistula placement.    Monitor hemoglobin, see below.     Acute anemia  Patient has required intermittent transfusions for on-going anemia.  Anemia most likely secondary to critical illness/chronic disease, chronic renal disease.  Baseline hemoglobin around 7-8.  Likely Epo resistance.    Hemoglobin stable at 8    Monitor " intermittently.     Hypercalcemia  Work-up shows low/low normal vitamin D, suppressed PTH.  But PTH related peptide is elevated.    Nephrology following and considered prolia, however patient's wife would like to hold off for now.    Holding VIT D and Phoslo.     Discussed with Dr Moulton regarding elevated PTHrP, suspected due to prolonged immobility. Plan is to interval follow up in a month or so. If trends up, then will plan forther work up.      History PEA arrest   PEA arrest prior to his previous admission and 1 episode of PEA associated with desaturation on 12/20.  No structural cardiac disease.      Chronic Hypotension  In the past has developed baseline hypotension with cirrhosis and prolonged critical illness.  On hemodialysis.  Receiving midodrine and albumin during dialysis.    Continue midodrine       History of seizure   After hypoxic event, in the context of baseline multifactorial encephalopathy secondary to his ongoing critical illness and prior cardiac arrests and prior strokes and cirrhosis with hepatic encephalopathy. MRI of head of 12/20/22 reveals no PRES or leptomeningeal enhancement but scattered.  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   No recent seizure activity.    Continue levetiracetam, lacosamide.    Seizure precautions.    Outpatient follow-up with neurology      ESRD  Anemia due to renal disease  Hypotension during dialysis  Hyperphosphatemia    Nephrology reviewing vein mapping to assess options for internal access placement for dialysis, see note 3/16.    Underwent Fistula placement on  3/21/22.    Vascular surgery following-continue to use for tunnel catheter.  Outpatient follow-up with vascular surgery and for will need a follow-up ultrasound of the fistula in 6 weeks to assess patency.    Started sevalmer 800mg TID with meals on 3/27/23.    Complained of cramping with dialysis. Tried a dose of oxycodone prior to dialysis  but this was not helpful. He will discuss the cramping with nephrology. Spouse prefers to avoid narcotic given encephalopathy.      Diabetes mellitus type 2  *Hemoglobin A1c on November 2022 was 4.7  *By report, on Lantus insulin in the past.  Blood sugar checks and sliding scale insulin previously discontinued as has been stable without insulin needs.    Monitor with periodic blood sugar checks as needed.     Hypomagnesemia    Resolved with replacement.     Severe malnutrition, protein and calorie type  Moderate dysphagia  G-tube feedings    Continue with tube feeds via PEG tube.    IR replaced the PEG tube on 2/8/2023, monitor.    Nutritionist consulted and following for tube feeds.    SLP following as well. Oral diet as per speech and language therapy (SLP). Patient hopeful to advance to thin liquids soon.    2/20 Nutrition following for tube feeds.    Now undergoing PRN tube feeds.    Encourage high-protein diet.    Nutrition notes reviewed, patient eating 0 to 100% of meals.  Continue current interventions.  As needed tube feed boluses available if patient consumes less than 50% of the meal.    Family encouraging patient to eat high protein diet including protein bars    Family asking about g tube removal. Per the EMR, last tube feed bolus was 3/22 if adequately documented. He refuses his tube feeds often per the dietitician. At this point, would favor keeping it in place given overall poor prognosis with ESRD, cirrhosis of transplanted liver, persistent encephalopathy, and inadequate oral intake. Event if he refuses, he is at risk for developing new infection or other decompensation and thus g tube as safety net.      Anxiety  Fluoxetine was discontinued as per psychiatry recommendation on 2/2 (see consult note for details).    Stable, monitor; comfort and reassurance offered during visit.    Psychiatry follow-up.     Restless leg  Syndrome    Patient started on ropiranole by neurology.       Diet:  Snacks/Supplements Adult: Other; Gelatein+ with brkfst and lunch, and magic cup with dinner (RD); With Meals  Combination Diet Regular Diet; Mildly Thick (level 2) (OK for fresh fruit (e.g., pineapple) per SLP)  Adult Formula Bolus Feeding: Novasource Renal; Route: Gastrostomy; 3 times daily; Volume per Bolus: 240; mL(s); Back up bolus for when pt consumes <50% of meals: 80 mL/hr x 3 hrs  Room Service    DVT Prophylaxis: DOAC  Nixon Catheter: Not present  Lines: PRESENT      CVC Double Lumen Right External jugular Tunneled-Site Assessment: WDL  Hemodialysis Vascular Access Arteriovenous fistula Left Forearm-Site Assessment: WDL      Cardiac Monitoring: None  Code Status: Full Code      Clinically Significant Risk Factors           # Hypercalcemia: Highest Ca = 10.3 mg/dL in last 2 days, will monitor as appropriate    # Hypoalbuminemia: Lowest albumin = 1.9 g/dL at 1/23/2023  6:38 AM, will monitor as appropriate            # Severe Malnutrition: based on nutrition assessment        Disposition Plan     Expected Discharge Date: 04/26/2023, 12:00 PM  Discharge Delays: Placement - LTC  Destination: inpatient rehabilitation facility  Discharge Comments: Dialysis pt MWF. Will need placement TCU/LTC. [facility] willing to accept once no sitter, SW to follow up when sitter free          Светлана Noble MD  Hospitalist Service  [facility]  Securely message with [employer] (more info)  Text page via [facility] Paging/Directory   ______________________________________________________________________    Interval History     Discussed with nursing staff this morning and evaluated patient while in dialysis.  Patient received trazodone last night and per nursing staff had good sleep.  He is in dialysis currently, complaining of back pain.  Also felt nauseous, has not gotten his breakfast yet.  Denies shortness of breath, dizziness, abdominal pain.     Physical Exam   Vital Signs: Temp: 97.5  F (36.4  C) Temp src:  Oral BP: (!) 88/55 Pulse: 75   Resp: 18 SpO2: 94 % O2 Device: None (Room air)    Weight: 178 lbs 3.2 oz    General: AAOx 3 today, appears comfortable.  HEENT: PERRLA EOMI. Mucosa moist.   Lungs: Bilateral equal air entry. Clear to auscultation, normal work of breathing.   CVS: S1S2 regular, no tachycardia or murmur.   Abdomen: Soft, NT, ND. BS heard.  MSK: No edema or deformities.  Neuro:  CN 2-12 normal. Strength symmetrical.  Skin: No rash.       Medical Decision Making       35 MINUTES SPENT BY ME on the date of service doing chart review, history, exam, documentation & further activities per the note.      Data     I have personally reviewed the following data over the past 24 hrs:    N/A  \   N/A   / N/A     N/A N/A N/A /  N/A   N/A N/A N/A \       ALT: N/A AST: N/A AP: N/A TBILI: N/A   ALB: N/A TOT PROTEIN: N/A LIPASE: N/A       Imaging results reviewed over the past 24 hrs:   No results found for this or any previous visit (from the past 24 hour(s)).

## 2023-04-19 NOTE — PROGRESS NOTES
Potassium   Date Value Ref Range Status   04/18/2023 4.2 3.4 - 5.3 mmol/L Final   12/29/2022 3.4 3.4 - 5.3 mmol/L Final   04/20/2020 3.9 3.4 - 5.3 mmol/L Final     Potassium POCT   Date Value Ref Range Status   01/19/2023 3.6 3.5 - 5.0 mmol/L Final     Hemoglobin   Date Value Ref Range Status   04/14/2023 8.9 (L) 13.3 - 17.7 g/dL Final   04/20/2020 14.3 13.3 - 17.7 g/dL Final     Creatinine   Date Value Ref Range Status   04/18/2023 5.62 (H) 0.67 - 1.17 mg/dL Final   04/20/2020 1.06 0.66 - 1.25 mg/dL Final     Urea Nitrogen   Date Value Ref Range Status   04/18/2023 69.9 (H) 6.0 - 20.0 mg/dL Final   12/29/2022 46 (H) 7 - 30 mg/dL Final   04/20/2020 23 7 - 30 mg/dL Final     Sodium   Date Value Ref Range Status   04/18/2023 136 136 - 145 mmol/L Final   04/20/2020 139 133 - 144 mmol/L Final     INR   Date Value Ref Range Status   12/21/2022 1.36 (H) 0.85 - 1.15 Final   10/07/2019 1.20 (H) 0.86 - 1.14 Final       DIALYSIS PROCEDURE NOTE  Hepatitis status of previous patient on machine log was checked and verified ok to use with this patients hepatitis status.  Patient dialyzed for 3 hrs. on a K3 bath with a net fluid removal of  1.8L.  A BFR of 250-350 ml/min was obtained via a CVC.      The treatment plan was discussed with Dr. Mariano during the treatment.    Total heparin received during the treatment: 0 units.   Line flushed, clamped and capped with heparin 1:1000 1.9 mL (1900 units) per lumen    Meds  given: epogen, albumin   Complications: none      Person educated: pre-tx. Knowledge base minimal. Barriers to learning: confusion. Educated on procedure via verbal mode. Patient/family verbalized understanding.   ICEBOAT? Timeout performed pre-treatment  I: Patient was identified using 2 identifiers  C:  Consent Signed Yes  E: Equipment preventative maintenance is current and dialysis delivery system OK to use  B: Hepatitis B Surface Antigen: negative; Draw Date: 3/31/23      Hepatitis B Surface Antibody:  susceptible; Draw Date: 3.29/23  O: Dialysis orders present and complete prior to treatment  A: Vascular access verified and assessed prior to treatment  T: Treatment was performed at a clinically appropriate time  ?: Patient was allowed to ask questions and address concerns prior to treatment  See Adult Hemodialysis flowsheet in [facility] for further details and post assessment.  Machine water alarm in place and functioning. Transducer pods intact and checked every 15min.   Pt returned via bed.  Chlorine/Chloramine water system checked every 4 hours.  Outpatient Dialysis at *not established    Patient repositioned every 2 hours during the treatment.  Post treatment report given to Simone Alegria RN regarding 1.8L of fluid removed, last BP of 95/67, and patient pain rating of 9/10.

## 2023-04-19 NOTE — PLAN OF CARE
Goal Outcome Evaluation:       SUMMARY: encephalopathy, ETOH hx, cirrhosis  DATE & TIME: 4/18/23 - 4/19/23 [id number]  Cognitive Concerns/ Orientation : Pt A/Ox2, disoriented to time and situation.   BEHAVIOR & AGGRESSION TOOL COLOR: Green   ABNL VS/O2: VSS on RA  MOBILITY: Assist x1 with gait belt and walker, fall risk. Repositions self in bed. Ambulated by the family in the hallway.   PAIN MANAGMENT: Complaints of back pain, PRN tylenol x1. - effective.  DIET: Regular (bolus feedings if <50% meals)  BOWEL/BLADDER: Continent. BM x1.  ABNL LAB/BG: Creat 5.62  DRAIN/DEVICES: R chest HD CVC, PEG tube clamped, L arm fistula  SKIN: Scattered bruising  TESTS/PROCEDURES: Dialysis MWF  D/C DATE: Discharge to TCU pending placement  OTHER IMPORTANT INFO: Pt calm and comfortable this shift.

## 2023-04-19 NOTE — PROGRESS NOTES
Assessment and Plan:   ESRD: seen on dialysis. 3h, 2-3 L UF, R  BFR, no heparin. K protocol, 35 HCO3 and 140 Na. On midodrine.   Taking renvela.    Needs placement in LTAC or TCU, preferably with dialysis on site.            Interval History:   Pancytopenia: getting EPO and folate.         Hepatic encephalopathy  Liver failure: S/P transplant, on tacrolimus.   Resp failure: resolved.  Afib: on apixaban.        Review of Systems:   C/O insomnia and back pain.           Medications:       - MEDICATION INSTRUCTIONS for Dialysis Patients -   Does not apply See Admin Instructions     sodium chloride 0.9%  250 mL Intravenous Once in dialysis/CRRT     sodium chloride 0.9%  300 mL Hemodialysis Machine Once     apixaban ANTICOAGULANT  5 mg Oral BID     epoetin mirta-epbx  10,000 Units Intravenous Once in dialysis/CRRT     folic acid  1 mg Oral or Feeding Tube Daily     sodium chloride (PF) 0.9%  10 mL Intracatheter Once in dialysis/CRRT    Followed by     heparin  1.3-2.6 mL Intracatheter Once in dialysis/CRRT     sodium chloride (PF) 0.9%  10 mL Intracatheter Once in dialysis/CRRT    Followed by     heparin  1.3-2.6 mL Intracatheter Once in dialysis/CRRT     Lacosamide  100 mg Oral Q12H     lactulose  10 g Oral BID     levETIRAcetam  1,000 mg Oral Q24H     lidocaine   Transdermal Q8H BIJU     menthol   Transdermal Q8H     midodrine  10 mg Oral or Feeding Tube TID w/meals     multivitamin RENAL  1 capsule Oral Daily     - MEDICATION INSTRUCTIONS -   Does not apply Once     OLANZapine zydis  2.5 mg Oral Once per day on Mon Wed Fri     OLANZapine zydis  2.5 mg Oral At Bedtime     pantoprazole  40 mg Oral QAM AC     ramelteon  8 mg Oral QPM     rifaximin  550 mg Oral or Feeding Tube BID     rOPINIRole  0.5 mg Oral At Bedtime     sevelamer carbonate  800 mg Oral TID w/meals     sodium chloride (PF)  9 mL Intracatheter During Dialysis/CRRT (from stock)     sodium chloride (PF)  9 mL Intracatheter During  Dialysis/CRRT (from stock)     tacrolimus  1 mg Oral Q12H     traZODone  25 mg Oral At Bedtime       - MEDICATION INSTRUCTIONS -       - MEDICATION INSTRUCTIONS -       Current active medications and PTA medications reviewed, see medication list for details.            Physical Exam:   Vitals were reviewed  Patient Vitals for the past 24 hrs:   BP Temp Temp src Pulse Resp SpO2 Weight   23 0915 (!) 88/55 -- -- 75 -- 94 % --   23 0900 103/68 -- -- 79 -- 94 % --   23 0845 107/61 -- -- 79 -- 96 % --   23 0830 104/70 -- -- 80 -- 95 % --   23 0742 (!) 81/46 97.5  F (36.4  C) Oral 74 18 95 % --   23 0634 -- -- -- -- -- -- 80.8 kg (178 lb 3.2 oz)   23 1553 121/79 97.9  F (36.6  C) Oral 74 18 99 % --       Temp:  [97.5  F (36.4  C)-97.9  F (36.6  C)] 97.5  F (36.4  C)  Pulse:  [74-80] 75  Resp:  [18] 18  BP: ()/(46-79) 88/55  SpO2:  [94 %-99 %] 94 %    Temperatures:  Current - Temp: 97.5  F (36.4  C); Max - Temp  Av.7  F (36.5  C)  Min: 97.5  F (36.4  C)  Max: 97.9  F (36.6  C)  Respiration range: Resp  Av  Min: 18  Max: 18  Pulse range: Pulse  Av.8  Min: 74  Max: 80  Blood pressure range: Systolic (24hrs), Av , Min:81 , Max:121   ; Diastolic (24hrs), Av, Min:46, Max:79    Pulse oximetry range: SpO2  Av.5 %  Min: 94 %  Max: 99 %    I/O last 3 completed shifts:  In: 440 [P.O.:440]  Out: -       Intake/Output Summary (Last 24 hours) at 2023 0919  Last data filed at 2023 0740  Gross per 24 hour   Intake 440 ml   Output --   Net 440 ml       Alert and responsive  R CVC with no redness or tenderness       Wt Readings from Last 4 Encounters:   23 80.8 kg (178 lb 3.2 oz)   23 82.4 kg (181 lb 11.2 oz)   22 96 kg (211 lb 10.3 oz)   22 100.2 kg (221 lb)          Data:          Lab Results   Component Value Date     2023     2023     2023     2020     10/07/2019      02/20/2019    Lab Results   Component Value Date    CHLORIDE 97 04/18/2023    CHLORIDE 96 04/17/2023    CHLORIDE 94 04/16/2023    CHLORIDE 103 12/29/2022    CHLORIDE 103 12/28/2022    CHLORIDE 102 12/27/2022    CHLORIDE 105 08/05/2020    CHLORIDE 106 04/20/2020    CHLORIDE 99 10/07/2019    CHLORIDE 108 08/01/2019    CHLORIDE 106 02/20/2019    Lab Results   Component Value Date    BUN 69.9 04/18/2023    BUN 93.4 04/17/2023    BUN 78.6 04/16/2023    BUN 46 12/29/2022    BUN 62 12/28/2022    BUN 52 12/27/2022    BUN 23 04/20/2020    BUN 18 10/07/2019    BUN 20 02/20/2019      Lab Results   Component Value Date    POTASSIUM 4.2 04/18/2023    POTASSIUM 5.1 04/17/2023    POTASSIUM 4.3 04/16/2023    POTASSIUM 3.6 01/19/2023    POTASSIUM  01/18/2023      Comment:      Disregard results.      POTASSIUM 3.6 01/17/2023    POTASSIUM 3.4 12/29/2022    POTASSIUM 3.7 12/28/2022    POTASSIUM 3.7 12/28/2022    POTASSIUM 3.9 04/20/2020    POTASSIUM 4.3 10/07/2019    POTASSIUM 4.2 02/20/2019    Lab Results   Component Value Date    CO2 25 04/18/2023    CO2 24 04/17/2023    CO2 25 04/16/2023    CO2 31 12/29/2022    CO2 29 12/28/2022    CO2 29 12/27/2022    CO2 28 04/20/2020    CO2 26 10/07/2019    CO2 24 02/20/2019    Lab Results   Component Value Date    CR 5.62 04/18/2023    CR 6.70 04/17/2023    CR 5.55 04/16/2023    CR 1.06 04/20/2020    CR 1.01 10/07/2019    CR 1.08 02/20/2019        Recent Labs   Lab Test 04/14/23  0725 04/12/23  0755 04/08/23  0859   WBC 3.6* 2.9* 3.4*   HGB 8.9* 8.7* 9.0*   HCT 29.1* 28.7* 30.3*   MCV 97 97 101*   * 95* 95*     Recent Labs   Lab Test 04/05/23  0959 03/25/23  1150 03/14/23  1033 03/13/23  1454 03/12/23  1116 03/11/23  0846 03/10/23  1457   AST 16  --  20 21  --    < > 23   ALT <5*  --  8* 9*  --    < > 9*   ALKPHOS 184*  --  177* 182*  --    < > 231*   BILITOTAL 0.4  --  0.4 0.4  --    < > 0.4   CHANDLER  --  69*  --   --  64*  --  30    < > = values in this interval not displayed.        Recent Labs   Lab Test 04/18/23  0928 04/17/23  0750 04/16/23  1004   MAG 2.0 2.1 2.0     Recent Labs   Lab Test 04/18/23  0928 04/17/23  0750 04/16/23  1004   PHOS 5.5* 6.6* 5.9*     Recent Labs   Lab Test 04/18/23  0928 04/17/23  0750 04/16/23  1004   KELLY 10.3* 10.4* 10.5*       Lab Results   Component Value Date    KELLY 10.3 (H) 04/18/2023     Lab Results   Component Value Date    WBC 3.6 (L) 04/14/2023    HGB 8.9 (L) 04/14/2023    HCT 29.1 (L) 04/14/2023    MCV 97 04/14/2023     (L) 04/14/2023     Lab Results   Component Value Date     04/18/2023    POTASSIUM 4.2 04/18/2023    CHLORIDE 97 (L) 04/18/2023    CO2 25 04/18/2023     (H) 04/18/2023     Lab Results   Component Value Date    BUN 69.9 (H) 04/18/2023    CR 5.62 (H) 04/18/2023     Lab Results   Component Value Date    MAG 2.0 04/18/2023     Lab Results   Component Value Date    PHOS 5.5 (H) 04/18/2023       Creatinine   Date Value Ref Range Status   04/18/2023 5.62 (H) 0.67 - 1.17 mg/dL Final   04/17/2023 6.70 (H) 0.67 - 1.17 mg/dL Final   04/16/2023 5.55 (H) 0.67 - 1.17 mg/dL Final   04/15/2023 4.89 (H) 0.67 - 1.17 mg/dL Final   04/14/2023 5.40 (H) 0.67 - 1.17 mg/dL Final   04/13/2023 4.63 (H) 0.67 - 1.17 mg/dL Final   04/20/2020 1.06 0.66 - 1.25 mg/dL Final   10/07/2019 1.01 0.66 - 1.25 mg/dL Final   02/20/2019 1.08 0.66 - 1.25 mg/dL Final   07/10/2018 1.32 (H) 0.66 - 1.25 mg/dL Final   10/31/2017 1.18 0.66 - 1.25 mg/dL Final   10/17/2017 1.26 (H) 0.66 - 1.25 mg/dL Final       Attestation:  I have reviewed today's vital signs, notes, medications, labs and imaging.  Seen on dialysis.      Elvis Mariano MD

## 2023-04-20 ENCOUNTER — APPOINTMENT (OUTPATIENT)
Dept: PHYSICAL THERAPY | Facility: CLINIC | Age: 58
DRG: 981 | End: 2023-04-20
Attending: STUDENT IN AN ORGANIZED HEALTH CARE EDUCATION/TRAINING PROGRAM
Payer: COMMERCIAL

## 2023-04-20 ENCOUNTER — APPOINTMENT (OUTPATIENT)
Dept: OCCUPATIONAL THERAPY | Facility: CLINIC | Age: 58
DRG: 981 | End: 2023-04-20
Attending: STUDENT IN AN ORGANIZED HEALTH CARE EDUCATION/TRAINING PROGRAM
Payer: COMMERCIAL

## 2023-04-20 ENCOUNTER — APPOINTMENT (OUTPATIENT)
Dept: SPEECH THERAPY | Facility: CLINIC | Age: 58
DRG: 981 | End: 2023-04-20
Attending: STUDENT IN AN ORGANIZED HEALTH CARE EDUCATION/TRAINING PROGRAM
Payer: COMMERCIAL

## 2023-04-20 LAB
ALBUMIN SERPL BCG-MCNC: 3.7 G/DL (ref 3.5–5.2)
ANION GAP SERPL CALCULATED.3IONS-SCNC: 12 MMOL/L (ref 7–15)
BUN SERPL-MCNC: 69 MG/DL (ref 6–20)
CALCIUM SERPL-MCNC: 10.2 MG/DL (ref 8.6–10)
CHLORIDE SERPL-SCNC: 100 MMOL/L (ref 98–107)
CREAT SERPL-MCNC: 5.34 MG/DL (ref 0.67–1.17)
DEPRECATED HCO3 PLAS-SCNC: 26 MMOL/L (ref 22–29)
GFR SERPL CREATININE-BSD FRML MDRD: 12 ML/MIN/1.73M2
GLUCOSE SERPL-MCNC: 121 MG/DL (ref 70–99)
PHOSPHATE SERPL-MCNC: 5.1 MG/DL (ref 2.5–4.5)
POTASSIUM SERPL-SCNC: 4.4 MMOL/L (ref 3.4–5.3)
SODIUM SERPL-SCNC: 138 MMOL/L (ref 136–145)

## 2023-04-20 PROCEDURE — 92526 ORAL FUNCTION THERAPY: CPT | Mod: GN | Performed by: SPEECH-LANGUAGE PATHOLOGIST

## 2023-04-20 PROCEDURE — 250N000013 HC RX MED GY IP 250 OP 250 PS 637: Performed by: PSYCHIATRY & NEUROLOGY

## 2023-04-20 PROCEDURE — 250N000013 HC RX MED GY IP 250 OP 250 PS 637: Performed by: STUDENT IN AN ORGANIZED HEALTH CARE EDUCATION/TRAINING PROGRAM

## 2023-04-20 PROCEDURE — 80197 ASSAY OF TACROLIMUS: CPT | Performed by: STUDENT IN AN ORGANIZED HEALTH CARE EDUCATION/TRAINING PROGRAM

## 2023-04-20 PROCEDURE — 99232 SBSQ HOSP IP/OBS MODERATE 35: CPT | Performed by: HOSPITALIST

## 2023-04-20 PROCEDURE — 97535 SELF CARE MNGMENT TRAINING: CPT | Mod: GO

## 2023-04-20 PROCEDURE — 120N000001 HC R&B MED SURG/OB

## 2023-04-20 PROCEDURE — 250N000013 HC RX MED GY IP 250 OP 250 PS 637

## 2023-04-20 PROCEDURE — 82040 ASSAY OF SERUM ALBUMIN: CPT | Performed by: STUDENT IN AN ORGANIZED HEALTH CARE EDUCATION/TRAINING PROGRAM

## 2023-04-20 PROCEDURE — 80177 DRUG SCRN QUAN LEVETIRACETAM: CPT | Performed by: STUDENT IN AN ORGANIZED HEALTH CARE EDUCATION/TRAINING PROGRAM

## 2023-04-20 PROCEDURE — 36415 COLL VENOUS BLD VENIPUNCTURE: CPT | Performed by: STUDENT IN AN ORGANIZED HEALTH CARE EDUCATION/TRAINING PROGRAM

## 2023-04-20 PROCEDURE — 80235 DRUG ASSAY LACOSAMIDE: CPT | Performed by: STUDENT IN AN ORGANIZED HEALTH CARE EDUCATION/TRAINING PROGRAM

## 2023-04-20 PROCEDURE — 97116 GAIT TRAINING THERAPY: CPT | Mod: GP

## 2023-04-20 PROCEDURE — 97110 THERAPEUTIC EXERCISES: CPT | Mod: GP

## 2023-04-20 PROCEDURE — 250N000012 HC RX MED GY IP 250 OP 636 PS 637: Performed by: STUDENT IN AN ORGANIZED HEALTH CARE EDUCATION/TRAINING PROGRAM

## 2023-04-20 PROCEDURE — 250N000013 HC RX MED GY IP 250 OP 250 PS 637: Performed by: HOSPITALIST

## 2023-04-20 RX ADMIN — ACETAMINOPHEN 650 MG: 325 TABLET, FILM COATED ORAL at 20:44

## 2023-04-20 RX ADMIN — TACROLIMUS 1 MG: 1 CAPSULE ORAL at 08:10

## 2023-04-20 RX ADMIN — APIXABAN 5 MG: 5 TABLET, FILM COATED ORAL at 20:33

## 2023-04-20 RX ADMIN — MIDODRINE HYDROCHLORIDE 10 MG: 5 TABLET ORAL at 11:17

## 2023-04-20 RX ADMIN — LACOSAMIDE 100 MG: 100 TABLET, FILM COATED ORAL at 10:47

## 2023-04-20 RX ADMIN — APIXABAN 5 MG: 5 TABLET, FILM COATED ORAL at 08:10

## 2023-04-20 RX ADMIN — PANTOPRAZOLE SODIUM 40 MG: 40 TABLET, DELAYED RELEASE ORAL at 08:10

## 2023-04-20 RX ADMIN — ACETAMINOPHEN 650 MG: 325 TABLET, FILM COATED ORAL at 23:51

## 2023-04-20 RX ADMIN — MIDODRINE HYDROCHLORIDE 10 MG: 5 TABLET ORAL at 17:35

## 2023-04-20 RX ADMIN — MIDODRINE HYDROCHLORIDE 10 MG: 5 TABLET ORAL at 08:10

## 2023-04-20 RX ADMIN — TACROLIMUS 1 MG: 1 CAPSULE ORAL at 20:35

## 2023-04-20 RX ADMIN — MELATONIN 5 MG TABLET 10 MG: at 00:31

## 2023-04-20 RX ADMIN — TRAZODONE HYDROCHLORIDE 25 MG: 50 TABLET ORAL at 20:36

## 2023-04-20 RX ADMIN — LACOSAMIDE 100 MG: 100 TABLET, FILM COATED ORAL at 20:46

## 2023-04-20 RX ADMIN — LACTULOSE 10 G: 10 SOLUTION ORAL at 20:33

## 2023-04-20 RX ADMIN — SEVELAMER CARBONATE 800 MG: 800 TABLET, FILM COATED ORAL at 11:17

## 2023-04-20 RX ADMIN — RIFAXIMIN 550 MG: 550 TABLET ORAL at 20:35

## 2023-04-20 RX ADMIN — LEVETIRACETAM 1000 MG: 500 TABLET, FILM COATED ORAL at 20:34

## 2023-04-20 RX ADMIN — FOLIC ACID 1 MG: 1 TABLET ORAL at 08:10

## 2023-04-20 RX ADMIN — RAMELTEON 8 MG: 8 TABLET, FILM COATED ORAL at 20:35

## 2023-04-20 RX ADMIN — RIFAXIMIN 550 MG: 550 TABLET ORAL at 08:10

## 2023-04-20 RX ADMIN — Medication 1 CAPSULE: at 08:10

## 2023-04-20 RX ADMIN — ROPINIROLE HYDROCHLORIDE 0.5 MG: 0.25 TABLET, FILM COATED ORAL at 20:45

## 2023-04-20 RX ADMIN — SEVELAMER CARBONATE 800 MG: 800 TABLET, FILM COATED ORAL at 17:35

## 2023-04-20 RX ADMIN — OLANZAPINE 2.5 MG: 5 TABLET, ORALLY DISINTEGRATING ORAL at 20:36

## 2023-04-20 RX ADMIN — LACTULOSE 10 G: 10 SOLUTION ORAL at 08:10

## 2023-04-20 RX ADMIN — ACETAMINOPHEN 650 MG: 325 TABLET, FILM COATED ORAL at 08:12

## 2023-04-20 RX ADMIN — SEVELAMER CARBONATE 800 MG: 800 TABLET, FILM COATED ORAL at 08:10

## 2023-04-20 ASSESSMENT — ACTIVITIES OF DAILY LIVING (ADL)
ADLS_ACUITY_SCORE: 48
ADLS_ACUITY_SCORE: 49
ADLS_ACUITY_SCORE: 48

## 2023-04-20 NOTE — PLAN OF CARE
Goal Outcome Evaluation:    DATE & TIME: 4/20/23 5530-6032     Cognitive Concerns/ Orientation : Pt A/Ox4 this shift, disoriented to situation at times    BEHAVIOR & AGGRESSION TOOL COLOR: Green   ABNL VS/O2: VSS on RA, BID vitals  MOBILITY: Assist x1 with gait belt and walker, fall risk  PAIN MANAGMENT: c/o not falling sleep given scheduled melatonin x1.  DIET: Regular with mildly thick liquids  BOWEL/BLADDER: Continent/ Assisted to the BR but no BM.  ABNL LAB/BG:  DRAIN/DEVICES: R chest HD CVC, PEG tube clamped, L arm hemodialysis fistula  SKIN: Scattered bruising. Peeling of feet. Blanchable redness to coccyx.  TESTS/PROCEDURES: Dialysis MWF  D/C DATE: Discharge to TCU pending placement

## 2023-04-20 NOTE — PLAN OF CARE
DATE & TIME: 4/19/23-4/20/23 4832-6333     Cognitive Concerns/ Orientation : Pt A/Ox3, disoriented to situation.    BEHAVIOR & AGGRESSION TOOL COLOR: Green   ABNL VS/O2: VSS on RA, 2x daily vitals/refused night shift VS  MOBILITY: Assist x1 with gait belt and walker, fall risk  PAIN MANAGMENT: c/o not falling sleep given scheduled melatonin x1.  DIET: Regular with mildly thick liquids  BOWEL/BLADDER: Continent/ Assisted to the BR but no BM.  ABNL LAB/BG: Cr 4.11  DRAIN/DEVICES: R chest HD CVC, PEG tube clamped, L arm hemodialysis fistula  SKIN: Scattered bruising. Peeling of feet. Blanchable redness to coccyx.  TESTS/PROCEDURES: Dialysis MWF  D/C DATE: Discharge to TCU pending placement

## 2023-04-20 NOTE — PROGRESS NOTES
Care Management Follow Up    Length of Stay (days): 89    Expected Discharge Date: Pending TCU and confirmed outpatient dialysis unit     Concerns to be Addressed: adjustment to diagnosis/illness, care coordination/care conferences, discharge planning     Patient plan of care discussed at interdisciplinary rounds: Yes    Anticipated Discharge Disposition:TCU     Anticipated Discharge Services:  rehab and dialysis  Anticipated Discharge DME:  marta    Patient/family educated on Medicare website which has current facility and service quality ratings:  yes  Education Provided on the Discharge Plan:  yes  Patient/Family in Agreement with the Plan: yes    Referrals Placed by CM/SW:  Multi TCUs, [facility]  Private pay costs discussed: Not applicable    Additional Information:  Care Coordinator last met with patient's family a few days ago concerning patient is making progress and family's ability to care for him at home once we have a secured dialysis unit.  At that time, we did not have current cognitive testing.  OT has seen with patient scoring 20/30 on MACE indicating 24/7 supervision.  Therapies are still recommending TCU.  We had been awaiting [facility] to make a final decision if they would be able to accept and they also have the ability to do dialysis there.  Unfortunately, they have now declined.  Much of his progress notes have how he was in the past with need for sitter due to agitation.  In discussion with [facility] this AM in working on securing a outpatient dialysis center, writer was told patient has been declined by [facility] due to their Medical Director's decision.  Questioned this, since patient is making progress.  Notes from the past 24 hrs have been faxed to [facility].      Rosalba Yoo, RN   Inpatient Care Management  [phone]

## 2023-04-20 NOTE — PLAN OF CARE
DATE & TIME: 4/19/23 8020-2892    Cognitive Concerns/ Orientation : Pt A/Ox3, disoriented to situation.    BEHAVIOR & AGGRESSION TOOL COLOR: Green   ABNL VS/O2: VSS on RA, 2x daily vitals  MOBILITY: Assist x1 with gait belt and walker, fall risk  PAIN MANAGMENT: Complaints of lower back pain, declines intervention  DIET: Regular with mildly thick liquids  BOWEL/BLADDER: Continent  ABNL LAB/BG: Cr 4.11  DRAIN/DEVICES: R chest HD CVC, PEG tube clamped, L arm hemodialysis fistula  SKIN: Scattered bruising. Peeling of feet. Blanchable redness to coccyx.  TESTS/PROCEDURES: Dialysis MWF  D/C DATE: Discharge to TCU pending placement

## 2023-04-20 NOTE — PROGRESS NOTES
[facility]  Medicine Progress Note - Hospitalist Service    Date of Admission:  1/21/2023    Assessment & Plan     Blanco Osborne is a 57 year-old male admitted on 1/21/2023 as a transfer from [facility] for evaluation of loculated pleural effusion. He has extensive PMH including h/o liver transplant 2016 due to alcohol abuse, pAF on chronic anticoagulation, history of diabetes mellitus type 2, CVA, hypertension, CHRISTIAN on BiPAP.  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 He was recovering in [facility] and was transferred to [facility] for consideration of chest tube placement and possible intrapleural lysis for worsening effusion.     Acute metabolic encephalopathy with delirium, multifactorial, -resolved  Hepatic encephalopathy  Chronic liver disease, history of liver transplant 2016  End-stage renal disease (ESRD), on hemodialysis (HD)  History of seizure, on Keppra and Vimpat  Waxing and waning mentation, coinciding with lactulose being held at [facility]  Earlier in hospital stay, remained confused and had pulled out tracheostomy tube, PICC line and pulled his dialysis catheter- replaced.    Mental status continues to fluctuate with periods of alertness and increased sleepiness but without agitation for several days and overall improving.  Off restraints and sitter for several days.    Complicated, prolonged hospital course with mental status concerns multifactorial related to hepatic encephalopathy, end-stage renal disease on dialysis, past PEA arrest, seizure disorder, medication and chronic liver disease with prior liver transplant.     Continue to reorient and redirect as able. Maintain normal day/night cycles and sleep-wake cycles.  Minimize sedating medications as able.  Remains  weak and deconditioned with complex, prolonged hospital course and limited mobility.  Encouragement and support offered daily to patient.    Continue Lactulose to 10 mg BID, monitor for symptoms; goal to have 2-3 bowel movements per day.    Ongoing hemodialysis, as per nephrology, 3X/week dialysis schedule. Nephrology following    Continue ramelteon 8 mg at bedtime; monitor for side effects including AM sedation, reassess daily.    Started on olanzapine 5 mg BID and PRN earlier during this hospitalization. Delirium improved.  Follow-up evaluation by psychiatry and dose has been further adjusted.  Changed to 2.5 Mg at bedtime and also during dialysis.  Reviewed with spouse and trazodone 25 Mg added at bedtime with good response for sleep per RN. No excessive sedation.     Seizure disorder stable, continue Keppra and Vimpat. No recurrent seizures reported of recent.     Neurology consult appreciated.  Patient has been started on ropinirole for restless leg syndrome.     Bilateral pleural effusions   Acute respiratory failure requiring tracheostomy  - resolved    Pneumonia  - resolved   ARDS  - resolved    Right pneumothorax - resolved   *Initial event was parainfluenza and strep pneumo pneumonia back in November 2022. Treated for ARDS. Had failed extubation x2 and tracheostomy performed on previous hospitalization. *Had additional complications of bilateral pneumothoraces as well as hydropneumothorax- pleural fluid grew candida parapsilosis  *Completed 4-week antifungal treatment. While in LTACH he was successfully weaned from the ventilator.  *Recently there were concern for worsening effusion while at [facility] and had thoracentesis 01/13 which returned exudative without growth on cultures. CT chest/abdomen/pelvis on 01/18 demonstrated worsening effusions and concerns for infected parapneumonic effusions with possible SBP.  Multiple pulmonologist at [facility] reviewed CT scan recommended transfer to [facility] for  consideration of chest tube placement and possible intrapleural lysis  *Thoracic surgery (Dr. Jackson) consulted during admission. CT chest small effusions on imaging and so chest tube was not inserted.   * ID and pulmonary as well consulted this stay and have since signed off. No abx.   *Patient pulled out his tracheostomy tube on the night 2/1-2/2 and is tolerating well without increased shortness of breath persistent cough or worsening hypoxia.       Stable on room air now; occasional cough reported.    Encourage incentive spirometry as able, up to chair, deep breathing.    Wound care followed for tracheostomy site care, has healed now       Possible splenic infract  Wedge shaped area on CT scan chest and CT abdomen on 3/7/23.    Hematology consulted, suspicion for infarct was low.    TTE no thrombus or vegetations.    No abdominal pain CTM.     S/p liver transplant 2016   Chronic immunosuppression, on tacrolimus  Cirrhosis with ascites  Subacute bacterial peritonitis (SBP), resolved  *Main manifestations of this are hepatic encephalopathy and ascites.  Hepatologist at [place] felt that most likely reason for the cirrhosis was metabolic syndrome or alcohol intake.  There was no evidence of rejection on biopsy and there was some evidence of regeneration. Paracentesis done on 12/22/2022 showed lactobacillus indicating SBP, treated with Unasyn and Augmentin, finished 2-week course 1/12/2023.  Requires large-volume paracentesis periodically for recurring ascites.    *Paracentesis 1/13/2023 yielded about 7500 cc. Cultures NGTD. paracentesis on 1/24 with 4.8 L fluid removal  Paracentesis on 3/6/23 No SBP    CT abdomen 3/9 small to moderate ascites. Continue intermittent paracentesis as needed if develops symptomatic ascites.    Monitor for signs and symptoms of recurrent spontaneous bacterial peritonitis.    Further treatment for recurrent ascites with TIPS deferred to his Liver Transplant team and/or Hepatology, he  "had an appointment on 4/21/2023 with Hepatology, Dr. Simms but needs to reschedule given ongoing hosptial stay.    Continue Tacrolimus 1 mg BID, rifaximin 550 mg BID.    Continue lactulose as ordered, titrate as needed to have 2-3 BMs    GI consult as needed in hospital for new acute issues, otherwise as outpatient.    Encourgae high protein diet.     Goals of care   As per prior rounding provider, \"on 1/25 nursing had mentioned that patient had refused to undergo dialysis and want to stop care, palliative care was consulted.  Patient and his spouse denied wanting to stop care and wanted ongoing restorative care including full code\"    Full Code status.    Needs placement to TCU -> SW working on placement. Discussed.      Diarrhea, improved, on lactulose for chronic liver disease    C difficile negative on 1/31. Secondary to lactulose. Discontinued rectal tube (2/12) as stools more formed.    Continue lactulose with goal of 2 to 3 soft bowel movement in 24 hours.     Paroxysmal atrial fibrillation  Chronic anticoagulation, apixaban  History of embolic strokes  Remains in sinus rhythm, on anticoagulation with apixaban indefinitely for stroke prophylaxis.      Continue apixaban anticoagulation, it was briefly held for fistula placement.    Monitor hemoglobin, see below.     Acute anemia  Patient has required intermittent transfusions for on-going anemia.  Anemia most likely secondary to critical illness/chronic disease, chronic renal disease.  Baseline hemoglobin around 7-8.  Likely Epo resistance.    Hemoglobin stable at 8    Monitor intermittently.     Hypercalcemia  Work-up shows low/low normal vitamin D, suppressed PTH.  But PTH related peptide is elevated.    Nephrology following and considered prolia, however patient's wife would like to hold off for now.    Holding VIT D and Phoslo.     Discussed with Dr Moulton regarding elevated PTHrP, suspected due to prolonged immobility. Plan is to interval follow up in a " month or so. If trends up, then will plan forther work up.      History PEA arrest   PEA arrest prior to his previous admission and 1 episode of PEA associated with desaturation on 12/20.  No structural cardiac disease.      Chronic Hypotension  In the past has developed baseline hypotension with cirrhosis and prolonged critical illness.  On hemodialysis.  Receiving midodrine and albumin during dialysis.    Continue midodrine       History of seizure   After hypoxic event, in the context of baseline multifactorial encephalopathy secondary to his ongoing critical illness and prior cardiac arrests and prior strokes and cirrhosis with hepatic encephalopathy. MRI of head of 12/20/22 reveals no PRES or leptomeningeal enhancement but scattered.  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   No recent seizure activity.    Continue levetiracetam, lacosamide.    Seizure precautions.    Outpatient follow-up with neurology      ESRD  Anemia due to renal disease  Hypotension during dialysis  Hyperphosphatemia    Nephrology reviewing vein mapping to assess options for internal access placement for dialysis, see note 3/16.    Underwent Fistula placement on  3/21/22.    Vascular surgery following-continue to use for tunnel catheter.  Outpatient follow-up with vascular surgery and for will need a follow-up ultrasound of the fistula in 6 weeks to assess patency.    Started sevalmer 800mg TID with meals on 3/27/23.    Complained of cramping with dialysis. Tried a dose of oxycodone prior to dialysis but this was not helpful. Spouse prefers to avoid narcotic given encephalopathy.      Diabetes mellitus type 2  *Hemoglobin A1c on November 2022 was 4.7  *By report, on Lantus insulin in the past.  Blood sugar checks and sliding scale insulin previously discontinued as has been stable without insulin needs.    Monitor with periodic blood sugar checks as  needed.     Hypomagnesemia    Resolved with replacement.     Severe malnutrition, protein and calorie type  Moderate dysphagia  G-tube feedings    Continue with tube feeds via PEG tube.    IR replaced the PEG tube on 2/8/2023, monitor.    Nutritionist consulted and following for tube feeds.    SLP following as well. Oral diet as per speech and language therapy (SLP). Patient hopeful to advance to thin liquids soon.    2/20 Nutrition following for tube feeds.    Now undergoing PRN tube feeds.    Encourage high-protein diet.    Nutrition notes reviewed, patient eating 0 to 100% of meals.  Continue current interventions.  As needed tube feed boluses available if patient consumes less than 50% of the meal.    Family encouraging patient to eat high protein diet including protein bars    Family asking about g tube removal. Per the EMR, last tube feed bolus was 3/22 if adequately documented. He refuses his tube feeds often per the dietitician. At this point, would favor keeping it in place given overall poor prognosis with ESRD, cirrhosis of transplanted liver, persistent encephalopathy, and inadequate oral intake. Event if he refuses, he is at risk for developing new infection or other decompensation and thus g tube as safety net.      Anxiety  Fluoxetine was discontinued as per psychiatry recommendation on 2/2 (see consult note for details).    Stable, monitor; comfort and reassurance offered during visit.    Psychiatry follow-up.     Restless leg  Syndrome    Patient started on ropiranole by neurology.       Diet: Snacks/Supplements Adult: Other; Gelatein+ with brkfst and lunch, and magic cup with dinner (RD); With Meals  Combination Diet Regular Diet; Mildly Thick (level 2) (OK for fresh fruit (e.g., pineapple) per SLP)  Adult Formula Bolus Feeding: Novasource Renal; Route: Gastrostomy; 3 times daily; Volume per Bolus: 240; mL(s); Back up bolus for when pt consumes <50% of meals: 80 mL/hr x 3 hrs  Room Service    DVT  Prophylaxis: DOAC  Nixon Catheter: Not present  Lines: PRESENT      CVC Double Lumen Right External jugular Tunneled-Site Assessment: WDL  Hemodialysis Vascular Access Arteriovenous fistula Left Forearm-Site Assessment: WDL      Cardiac Monitoring: None  Code Status: Full Code      Clinically Significant Risk Factors           # Hypercalcemia: Highest Ca = 10.2 mg/dL in last 2 days, will monitor as appropriate    # Hypoalbuminemia: Lowest albumin = 1.9 g/dL at 1/23/2023  6:38 AM, will monitor as appropriate            # Severe Malnutrition: based on nutrition assessment        Disposition Plan      Expected Discharge Date: 04/29/2023, 12:00 PM  Discharge Delays: Placement - LTC  Destination: inpatient rehabilitation facility  Discharge Comments: Dialysis pt MWF. Will need placement TCU/LTC. [facility] willing to accept once no sitter, SW to follow up when sitter free          Светлана Noble MD  Hospitalist Service  [facility]  Securely message with [employer] (more info)  Text page via [employer] Paging/Directory   ______________________________________________________________________    Interval History     Discussed with nursing staff this morning and evaluated patient. Patient was just waking up. Per RN, had good sleep. No agitation. Back pain controlled with tylenol.     Physical Exam   Vital Signs: Temp: 97.4  F (36.3  C) Temp src: Axillary BP: 112/70 Pulse: 84   Resp: 16 SpO2: 94 % O2 Device: None (Room air)    Weight: 177 lbs 1.6 oz    General: AAOx 3 today, appears comfortable.  HEENT: PERRLA EOMI. Mucosa moist.   Lungs: Bilateral equal air entry. Clear to auscultation, normal work of breathing.   CVS: S1S2 regular, no tachycardia or murmur.   Abdomen: Soft, NT, ND. BS heard.  MSK: No edema or deformities.  Neuro:  CN 2-12 normal. Strength symmetrical.  Skin: No rash.       Medical Decision Making       35 MINUTES SPENT BY ME on the date of service doing chart review, history, exam,  documentation & further activities per the note.      Data     I have personally reviewed the following data over the past 24 hrs:    N/A  \   N/A   / N/A     138 100 69.0 (H) /  121 (H)   4.4 26 5.34 (H) \       ALT: N/A AST: N/A AP: N/A TBILI: N/A   ALB: 3.7 TOT PROTEIN: N/A LIPASE: N/A       Imaging results reviewed over the past 24 hrs:   No results found for this or any previous visit (from the past 24 hour(s)).

## 2023-04-21 ENCOUNTER — TELEPHONE (OUTPATIENT)
Dept: FAMILY MEDICINE | Facility: CLINIC | Age: 58
End: 2023-04-21
Payer: COMMERCIAL

## 2023-04-21 LAB
LEVETIRACETAM SERPL-MCNC: 28.9 ΜG/ML (ref 10–40)
TACROLIMUS BLD-MCNC: 4.6 UG/L (ref 5–15)
TME LAST DOSE: ABNORMAL H
TME LAST DOSE: ABNORMAL H

## 2023-04-21 PROCEDURE — 250N000013 HC RX MED GY IP 250 OP 250 PS 637: Performed by: STUDENT IN AN ORGANIZED HEALTH CARE EDUCATION/TRAINING PROGRAM

## 2023-04-21 PROCEDURE — 250N000012 HC RX MED GY IP 250 OP 636 PS 637: Performed by: STUDENT IN AN ORGANIZED HEALTH CARE EDUCATION/TRAINING PROGRAM

## 2023-04-21 PROCEDURE — 250N000011 HC RX IP 250 OP 636: Performed by: INTERNAL MEDICINE

## 2023-04-21 PROCEDURE — 250N000013 HC RX MED GY IP 250 OP 250 PS 637: Performed by: PSYCHIATRY & NEUROLOGY

## 2023-04-21 PROCEDURE — 90937 HEMODIALYSIS REPEATED EVAL: CPT

## 2023-04-21 PROCEDURE — 250N000013 HC RX MED GY IP 250 OP 250 PS 637: Performed by: HOSPITALIST

## 2023-04-21 PROCEDURE — 634N000001 HC RX 634: Performed by: INTERNAL MEDICINE

## 2023-04-21 PROCEDURE — 99232 SBSQ HOSP IP/OBS MODERATE 35: CPT | Performed by: HOSPITALIST

## 2023-04-21 PROCEDURE — 90935 HEMODIALYSIS ONE EVALUATION: CPT | Performed by: INTERNAL MEDICINE

## 2023-04-21 PROCEDURE — 120N000001 HC R&B MED SURG/OB

## 2023-04-21 RX ORDER — LANOLIN ALCOHOL/MO/W.PET/CERES
3 CREAM (GRAM) TOPICAL AT BEDTIME
Status: DISCONTINUED | OUTPATIENT
Start: 2023-04-21 | End: 2023-04-28 | Stop reason: HOSPADM

## 2023-04-21 RX ORDER — TRAZODONE HYDROCHLORIDE 50 MG/1
50 TABLET, FILM COATED ORAL AT BEDTIME
Status: DISCONTINUED | OUTPATIENT
Start: 2023-04-21 | End: 2023-04-28 | Stop reason: HOSPADM

## 2023-04-21 RX ADMIN — MIDODRINE HYDROCHLORIDE 10 MG: 5 TABLET ORAL at 13:18

## 2023-04-21 RX ADMIN — APIXABAN 5 MG: 5 TABLET, FILM COATED ORAL at 21:35

## 2023-04-21 RX ADMIN — APIXABAN 5 MG: 5 TABLET, FILM COATED ORAL at 13:18

## 2023-04-21 RX ADMIN — LACOSAMIDE 100 MG: 100 TABLET, FILM COATED ORAL at 21:36

## 2023-04-21 RX ADMIN — Medication 1 CAPSULE: at 13:17

## 2023-04-21 RX ADMIN — MIDODRINE HYDROCHLORIDE 10 MG: 5 TABLET ORAL at 08:10

## 2023-04-21 RX ADMIN — RIFAXIMIN 550 MG: 550 TABLET ORAL at 21:35

## 2023-04-21 RX ADMIN — MELATONIN TAB 3 MG 3 MG: 3 TAB at 21:35

## 2023-04-21 RX ADMIN — EPOETIN ALFA-EPBX 10000 UNITS: 10000 INJECTION, SOLUTION INTRAVENOUS; SUBCUTANEOUS at 10:44

## 2023-04-21 RX ADMIN — LACTULOSE 10 G: 10 SOLUTION ORAL at 21:36

## 2023-04-21 RX ADMIN — ROPINIROLE HYDROCHLORIDE 0.5 MG: 0.25 TABLET, FILM COATED ORAL at 21:36

## 2023-04-21 RX ADMIN — SEVELAMER CARBONATE 800 MG: 800 TABLET, FILM COATED ORAL at 13:18

## 2023-04-21 RX ADMIN — ALTEPLASE 2 MG: 2.2 INJECTION, POWDER, LYOPHILIZED, FOR SOLUTION INTRAVENOUS at 10:44

## 2023-04-21 RX ADMIN — MIDODRINE HYDROCHLORIDE 10 MG: 5 TABLET ORAL at 17:19

## 2023-04-21 RX ADMIN — ALTEPLASE 2 MG: 2.2 INJECTION, POWDER, LYOPHILIZED, FOR SOLUTION INTRAVENOUS at 10:47

## 2023-04-21 RX ADMIN — TACROLIMUS 1 MG: 1 CAPSULE ORAL at 21:36

## 2023-04-21 RX ADMIN — ACETAMINOPHEN 650 MG: 325 TABLET, FILM COATED ORAL at 21:42

## 2023-04-21 RX ADMIN — TACROLIMUS 1 MG: 1 CAPSULE ORAL at 13:17

## 2023-04-21 RX ADMIN — TRAZODONE HYDROCHLORIDE 50 MG: 50 TABLET ORAL at 21:36

## 2023-04-21 RX ADMIN — RIFAXIMIN 550 MG: 550 TABLET ORAL at 13:18

## 2023-04-21 RX ADMIN — RAMELTEON 8 MG: 8 TABLET, FILM COATED ORAL at 21:36

## 2023-04-21 RX ADMIN — SEVELAMER CARBONATE 800 MG: 800 TABLET, FILM COATED ORAL at 17:19

## 2023-04-21 RX ADMIN — PANTOPRAZOLE SODIUM 40 MG: 40 TABLET, DELAYED RELEASE ORAL at 08:10

## 2023-04-21 RX ADMIN — ACETAMINOPHEN 650 MG: 325 TABLET, FILM COATED ORAL at 08:12

## 2023-04-21 RX ADMIN — FOLIC ACID 1 MG: 1 TABLET ORAL at 13:18

## 2023-04-21 RX ADMIN — LACTULOSE 10 G: 10 SOLUTION ORAL at 13:22

## 2023-04-21 RX ADMIN — LEVETIRACETAM 1000 MG: 500 TABLET, FILM COATED ORAL at 21:35

## 2023-04-21 RX ADMIN — LACOSAMIDE 100 MG: 100 TABLET, FILM COATED ORAL at 13:18

## 2023-04-21 ASSESSMENT — ACTIVITIES OF DAILY LIVING (ADL)
ADLS_ACUITY_SCORE: 52
ADLS_ACUITY_SCORE: 48
ADLS_ACUITY_SCORE: 52
ADLS_ACUITY_SCORE: 48
ADLS_ACUITY_SCORE: 52
ADLS_ACUITY_SCORE: 48
ADLS_ACUITY_SCORE: 52
ADLS_ACUITY_SCORE: 48

## 2023-04-21 NOTE — PROGRESS NOTES
[facility]  Medicine Progress Note - Hospitalist Service    Date of Admission:  1/21/2023    Assessment & Plan     Blanco Osborne is a 57 year-old male admitted on 1/21/2023 as a transfer from [facility] for evaluation of loculated pleural effusion. He has extensive PMH including h/o liver transplant 2016 due to alcohol abuse, pAF on chronic anticoagulation, history of diabetes mellitus type 2, CVA, hypertension, CHRISTIAN on BiPAP.  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 He was recovering in [facility] and was transferred to [facility] for consideration of chest tube placement and possible intrapleural lysis for worsening effusion.     Acute metabolic encephalopathy with delirium, multifactorial, -resolved  Hepatic encephalopathy  Chronic liver disease, history of liver transplant 2016  ESRD on HD  History of seizure, on Keppra and Vimpat  Waxing and waning mentation, coinciding with lactulose being held at [facility]  Earlier in hospital stay, remained confused and had pulled out tracheostomy tube, PICC line and pulled his dialysis catheter- replaced.    Mental status continues to fluctuate with periods of alertness and increased sleepiness but without agitation for several days and overall improving.  Off restraints and sitter for several days.    Complicated, prolonged hospital course with mental status concerns multifactorial related to hepatic encephalopathy, end-stage renal disease on dialysis, past PEA arrest, seizure disorder, medication and chronic liver disease with prior liver transplant.     Continue to reorient and redirect as able. Maintain normal day/night cycles and sleep-wake cycles.  Minimize sedating medications as able.  Remains weak and deconditioned with complex,  prolonged hospital course and limited mobility.  Encouragement and support offered daily to patient.    Continue Lactulose to 10 mg BID, monitor for symptoms; goal to have 2-3 bowel movements per day.    Ongoing hemodialysis, as per nephrology, 3X/week dialysis schedule. Nephrology following    Continue ramelteon 8 mg at bedtime; monitor for side effects including AM sedation, reassess daily.    Started on olanzapine 5 mg BID and PRN earlier during this hospitalization. Delirium improved/resolved. Now scheduled HS dose discontinued. Plan to stop the doses with HD as well in upcoming days. Psychiatry recommended  trazodone 25 mg at bedtime, increased to 50 mg. Melatonin as well added.     Continue Keppra and Vimpat. No recurrent seizures reported of recent.     Neurology consult appreciated.  Patient has been started on ropinirole for restless leg syndrome.     Bilateral pleural effusions   Acute respiratory failure requiring tracheostomy  - resolved    Pneumonia  - resolved   ARDS  - resolved    Right pneumothorax - resolved   *Initial event was parainfluenza and strep pneumo pneumonia back in November 2022. Treated for ARDS. Had failed extubation x2 and tracheostomy performed on previous hospitalization. *Had additional complications of bilateral pneumothoraces as well as hydropneumothorax- pleural fluid grew candida parapsilosis  *Completed 4-week antifungal treatment. While in LTACH he was successfully weaned from the ventilator.  *Recently there were concern for worsening effusion while at [facility] and had thoracentesis 01/13 which returned exudative without growth on cultures. CT chest/abdomen/pelvis on 01/18 demonstrated worsening effusions and concerns for infected parapneumonic effusions with possible SBP.  Multiple pulmonologist at [facility] reviewed CT scan recommended transfer to [facility] for consideration of chest tube placement and possible intrapleural lysis  *Thoracic surgery (Dr. Jackson) consulted during  admission. CT chest small effusions on imaging and so chest tube was not inserted.   * ID and pulmonary as well consulted this stay and have since signed off. No abx.   *Patient pulled out his tracheostomy tube on the night 2/1-2/2 and is tolerating well without increased shortness of breath persistent cough or worsening hypoxia.     Stable on room air now; occasional cough reported.    Encourage incentive spirometry as able, up to chair, deep breathing.    Tracheostomy site has healed now       Possible splenic infract  Wedge shaped area on CT scan chest and CT abdomen on 3/7/23.    Hematology consulted, suspicion for infarct was low.    TTE no thrombus or vegetations.    No abdominal pain CTM.     S/p liver transplant 2016   Chronic immunosuppression, on tacrolimus  Cirrhosis with ascites  Subacute bacterial peritonitis (SBP), resolved  *Main manifestations of this are hepatic encephalopathy and ascites.  Hepatologist at [place] felt that most likely reason for the cirrhosis was metabolic syndrome or alcohol intake.  There was no evidence of rejection on biopsy and there was some evidence of regeneration. Paracentesis done on 12/22/2022 showed lactobacillus indicating SBP, treated with Unasyn and Augmentin, finished 2-week course 1/12/2023.  Requires large-volume paracentesis periodically for recurring ascites.    *Paracentesis 1/13/2023 yielded about 7500 cc. Cultures NGTD. paracentesis on 1/24 with 4.8 L fluid removal  Paracentesis on 3/6/23 No SBP    CT abdomen 3/9 small to moderate ascites. Continue intermittent paracentesis as needed if develops symptomatic ascites.    Monitor for signs and symptoms of recurrent spontaneous bacterial peritonitis.    Further treatment for recurrent ascites with TIPS deferred to his Liver Transplant team and/or Hepatology, he had an appointment on 4/21/2023 with Hepatology, Dr. Simms but needs to reschedule given ongoing hosptial stay.    Continue Tacrolimus 1 mg  "BID, rifaximin 550 mg BID.    Continue lactulose as ordered, titrate as needed to have 2-3 BMs    GI consult as needed in hospital for new acute issues, otherwise as outpatient.    Encourgae high protein diet.     Goals of care   As per prior rounding provider, \"on 1/25 nursing had mentioned that patient had refused to undergo dialysis and want to stop care, palliative care was consulted.  Patient and his spouse denied wanting to stop care and wanted ongoing restorative care including full code\"    Full Code status.    Needs placement to TCU -> SW working on placement. Discussed.      Diarrhea, improved, on lactulose for chronic liver disease    C difficile negative on 1/31. Secondary to lactulose. Discontinued rectal tube (2/12) as stools more formed.    Continue lactulose with goal of 2 to 3 soft bowel movement in 24 hours.     Paroxysmal atrial fibrillation  Chronic anticoagulation, apixaban  History of embolic strokes  Remains in sinus rhythm, on anticoagulation with apixaban indefinitely for stroke prophylaxis.      Continue apixaban anticoagulation, it was briefly held for fistula placement.    Monitor hemoglobin, see below.     Acute anemia  Patient has required intermittent transfusions for on-going anemia.  Anemia most likely secondary to critical illness/chronic disease, chronic renal disease.  Baseline hemoglobin around 7-8.  Likely Epo resistance.    Hemoglobin stable at 8    Monitor intermittently.     Hypercalcemia  Work-up shows low/low normal vitamin D, suppressed PTH.  But PTH related peptide is elevated.    Nephrology following and considered prolia, however patient's wife would like to hold off for now.    Holding VIT D and Phoslo.     Discussed with Dr Moulton regarding elevated PTHrP, suspected due to prolonged immobility. Plan is to interval follow up in a month or so. If trends up, then will plan forther work up.      History PEA arrest   PEA arrest prior to his previous admission and 1 episode " of PEA associated with desaturation on 12/20.  No structural cardiac disease.      Chronic Hypotension  In the past has developed baseline hypotension with cirrhosis and prolonged critical illness.  On hemodialysis.  Receiving midodrine and albumin during dialysis.    Continue midodrine       History of seizure   After hypoxic event, in the context of baseline multifactorial encephalopathy secondary to his ongoing critical illness and prior cardiac arrests and prior strokes and cirrhosis with hepatic encephalopathy. MRI of head of 12/20/22 reveals no PRES or leptomeningeal enhancement but scattered.  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   No recent seizure activity.    Continue levetiracetam, lacosamide.    Seizure precautions.    Outpatient follow-up with neurology      ESRD  Anemia due to renal disease  Hypotension during dialysis  Hyperphosphatemia    Nephrology reviewing vein mapping to assess options for internal access placement for dialysis, see note 3/16.    Underwent Fistula placement on  3/21/22.    Vascular surgery following-continue to use for tunnel catheter.  Outpatient follow-up with vascular surgery and for will need a follow-up ultrasound of the fistula in 6 weeks to assess patency.    Started sevalmer 800mg TID with meals on 3/27/23.    Complained of cramping with dialysis. Tried a dose of oxycodone prior to dialysis but this was not helpful. Spouse prefers to avoid narcotic given encephalopathy.      Diabetes mellitus type 2  *Hemoglobin A1c on November 2022 was 4.7  *By report, on Lantus insulin in the past.  Blood sugar checks and sliding scale insulin previously discontinued as has been stable without insulin needs.    Monitor with periodic blood sugar checks as needed.     Hypomagnesemia    Resolved with replacement.     Severe malnutrition, protein and calorie type  Moderate dysphagia  G-tube feedings    Continue with tube feeds via  PEG tube.    IR replaced the PEG tube on 2/8/2023, monitor.    Nutritionist consulted and following for tube feeds.    SLP following as well. Oral diet as per speech and language therapy (SLP). Patient hopeful to advance to thin liquids soon.    2/20 Nutrition following for tube feeds.    Now undergoing PRN tube feeds.    Encourage high-protein diet.    Nutrition notes reviewed, patient eating 0 to 100% of meals.  Continue current interventions.  As needed tube feed boluses available if patient consumes less than 50% of the meal.    Family encouraging patient to eat high protein diet including protein bars    Family asking about g tube removal. Per the EMR, last tube feed bolus was 3/22 if adequately documented. He refuses his tube feeds often per the dietitician. At this point, would favor keeping it in place given overall poor prognosis with ESRD, cirrhosis of transplanted liver, persistent encephalopathy, and inadequate oral intake. Event if he refuses, he is at risk for developing new infection or other decompensation and thus g tube as safety net.      Anxiety  Fluoxetine was discontinued as per psychiatry recommendation on 2/2 (see consult note for details).    Psychiatry follow-up.     Restless leg  Syndrome    Patient started on ropiranole by neurology.       Diet: Snacks/Supplements Adult: Other; Gelatein+ with brkfst and lunch, and magic cup with dinner (RD); With Meals  Combination Diet Regular Diet; Mildly Thick (level 2) (OK for fresh fruit (e.g., pineapple) per SLP)  Adult Formula Bolus Feeding: Novasource Renal; Route: Gastrostomy; 3 times daily; Volume per Bolus: 240; mL(s); Back up bolus for when pt consumes <50% of meals: 80 mL/hr x 3 hrs  Room Service    DVT Prophylaxis: DOAC  Nixon Catheter: Not present  Lines: PRESENT      CVC Double Lumen Right External jugular Tunneled-Site Assessment: WDL  Hemodialysis Vascular Access Arteriovenous fistula Left Forearm-Site Assessment: WDL;Bruit  present;Thrill present      Cardiac Monitoring: None  Code Status: Full Code      Clinically Significant Risk Factors           # Hypercalcemia: Highest Ca = 10.2 mg/dL in last 2 days, will monitor as appropriate    # Hypoalbuminemia: Lowest albumin = 1.9 g/dL at 1/23/2023  6:38 AM, will monitor as appropriate            # Severe Malnutrition: based on nutrition assessment        Disposition Plan      Expected Discharge Date: 04/29/2023, 12:00 PM  Discharge Delays: Placement - LTC  Destination: inpatient rehabilitation facility  Discharge Comments: Dialysis pt MWF. Will need placement TCU/LTC.      Stable for discharge.        Светлана Noble MD  Hospitalist Service  [facility]  Securely message with [facility] (more info)  Text page via [employer] Paging/Directory   ______________________________________________________________________    Interval History     Discussed with nursing staff this morning and evaluated patient.  Followed up after he came back from hemodialysis.  Patient was somewhat sleepy this morning but currently up in the chair, feels good today.  Denies pain.   Nursing reported he could not sleep much last night. Discussed with spouse over the phone.    Physical Exam   Vital Signs: Temp: 97.9  F (36.6  C) Temp src: Oral BP: 108/69 Pulse: 76   Resp: 20 SpO2: 96 % O2 Device: None (Room air)    Weight: 177 lbs 1.6 oz    General: AAOx 3 today, appears comfortable.  HEENT: PERRLA EOMI. Mucosa moist.   Lungs: Bilateral equal air entry. Clear to auscultation, normal work of breathing.   CVS: S1S2 regular, no tachycardia or murmur.   Abdomen: Soft, NT, ND. BS heard.  MSK: No edema or deformities.  Neuro:  CN 2-12 normal. Strength symmetrical.  Skin: No rash.       Medical Decision Making       35 MINUTES SPENT BY ME on the date of service doing chart review, history, exam, documentation & further activities per the note.      Data         Imaging results reviewed over the past 24 hrs:    No results found for this or any previous visit (from the past 24 hour(s)).

## 2023-04-21 NOTE — PROGRESS NOTES
Care Management Follow Up    Length of Stay (days): 90    Expected Discharge Date: Pending confirmation of outpatient dialysis chair time and TCU acceptance     Concerns to be Addressed: adjustment to diagnosis/illness, care coordination/care conferences, discharge planning     Patient plan of care discussed at interdisciplinary rounds: Yes    Anticipated Discharge Disposition:TCU     Anticipated Discharge Services: rehab and dialysis   Anticipated Discharge DME:  NA    Patient/family educated on Medicare website which has current facility and service quality ratings:  NA  Education Provided on the Discharge Plan:  yes  Patient/Family in Agreement with the Plan: yes    Referrals Placed by CM/SW:  Dialysis/TCUs  Private pay costs discussed: Not applicable    Additional Information:  Care Coordinator met with patient, his Spouse Ofe and his Brother Dylan again yesterday.    Patient is requiring 24 hr supervision.  His family is not able to provide this for at least another week.  Ofe is cutting down to a .4 FTE.  They are planning to have Dylan's son being hired as his .    Patient does not have a PCP, so will need to have this established before a home care agency would be able to get involved.  Writer will call central scheduling for  clinics to get patient scheduled.    Have talked to Reji in [facility] late yesterday.  He agreed to do an assessment by [facility] and then thought he would do an on site assessment.  Writer had put the SW for contact number.  PAULIE was called by Reji this AM.  Concern now is the cost of three of his medications (Renvela, Vinpat, and Rifaxamin).  It is doubtful patient would be able to discontinue any of these medications with his health problems.  Hopefully, will receive more information from [facility] concerning their ability to accept patient for a week or two.  Therapies are recommending TCU.   If we do get acceptance then we will need to get authorization from  [facility].    Still needing a outpatient dialysis unit.  Discussed further with Diaz ([phone]) from [facility].  Discussed other units in the general area now that [facility] declined.  They are awaiting decision from [facility] which does have a M/W/F afternoon start time.      Rosalba Yoo, RN   Inpatient Care Management  [phone]

## 2023-04-21 NOTE — PROGRESS NOTES
Potassium   Date Value Ref Range Status   04/20/2023 4.4 3.4 - 5.3 mmol/L Final   12/29/2022 3.4 3.4 - 5.3 mmol/L Final   04/20/2020 3.9 3.4 - 5.3 mmol/L Final     Potassium POCT   Date Value Ref Range Status   01/19/2023 3.6 3.5 - 5.0 mmol/L Final     Hemoglobin   Date Value Ref Range Status   04/14/2023 8.9 (L) 13.3 - 17.7 g/dL Final   04/20/2020 14.3 13.3 - 17.7 g/dL Final     Creatinine   Date Value Ref Range Status   04/20/2023 5.34 (H) 0.67 - 1.17 mg/dL Final   04/20/2020 1.06 0.66 - 1.25 mg/dL Final     Urea Nitrogen   Date Value Ref Range Status   04/20/2023 69.0 (H) 6.0 - 20.0 mg/dL Final   12/29/2022 46 (H) 7 - 30 mg/dL Final   04/20/2020 23 7 - 30 mg/dL Final     Sodium   Date Value Ref Range Status   04/20/2023 138 136 - 145 mmol/L Final   04/20/2020 139 133 - 144 mmol/L Final     INR   Date Value Ref Range Status   12/21/2022 1.36 (H) 0.85 - 1.15 Final   10/07/2019 1.20 (H) 0.86 - 1.14 Final       DIALYSIS PROCEDURE NOTE  Hepatitis status of previous patient on machine log was checked and verified ok to use with this patients hepatitis status.  Patient dialyzed for 3hrs. on a K3 bath with a net fluid removal of  0.8L. A BFR of 200-300ml/min was obtained via a Right internal jugular CVC. The treatment plan was discussed with  during the treatment.    Total heparin received during the treatment: 0 units.     Line flushed, clamped and capped with heparin 1:1000 1.9mL ( 1900units) per lumen    Meds given: 10,000units epoetin given   Complications: Given cathflo post treatment. Treatment stopped 1.5 hours early after blood rinseback due to poor infusion pressures during treatment unresolved by repositioning lines and patient, reducing BFR under 300mL/min, and switching lines. Nephrologist made aware and agreeable alteplase    Person educated: patient. Knowledge base basic. Barriers to learning: intermittent confusion. Educated on oral via oral mode. Patient verbalized understanding. Pt prefers oral  education style.     ICEBOAT? Timeout performed pre-treatment  I: Patient was identified using 2 identifiers  C:  Consent Signed Yes  E: Equipment preventative maintenance is current and dialysis delivery system OK to use  B: Hepatitis B Surface Antigen: negative; Draw Date: 3/29/23      Hepatitis B Surface Antibody: susceptible; Draw Date: 3/29/23  O: Dialysis orders present and complete prior to treatment  A: Vascular access verified and assessed prior to treatment  T: Treatment was performed at a clinically appropriate time  ?: Patient was allowed to ask questions and address concerns prior to treatment  See Adult Hemodialysis flowsheet in [facility] for further details and post assessment.  Machine water alarm in place and functioning. Transducer pods intact and checked every 15min.   Pt returned via bed transport.  Chlorine/Chloramine water system checked every 4 hours.  Outpatient Dialysis TBD    Patient repositioned every 2 hours during the treatment.  Post treatment report given to NIA Reveles RN regarding 0.8L of fluid removed, last BP of 124/82 , and patient pain rating of 2/10.

## 2023-04-21 NOTE — PROGRESS NOTES
Potassium   Date Value Ref Range Status   04/20/2023 4.4 3.4 - 5.3 mmol/L Final   12/29/2022 3.4 3.4 - 5.3 mmol/L Final   04/20/2020 3.9 3.4 - 5.3 mmol/L Final     Potassium POCT   Date Value Ref Range Status   01/19/2023 3.6 3.5 - 5.0 mmol/L Final     Hemoglobin   Date Value Ref Range Status   04/14/2023 8.9 (L) 13.3 - 17.7 g/dL Final   04/20/2020 14.3 13.3 - 17.7 g/dL Final     Creatinine   Date Value Ref Range Status   04/20/2023 5.34 (H) 0.67 - 1.17 mg/dL Final   04/20/2020 1.06 0.66 - 1.25 mg/dL Final     Urea Nitrogen   Date Value Ref Range Status   04/20/2023 69.0 (H) 6.0 - 20.0 mg/dL Final   12/29/2022 46 (H) 7 - 30 mg/dL Final   04/20/2020 23 7 - 30 mg/dL Final     Sodium   Date Value Ref Range Status   04/20/2023 138 136 - 145 mmol/L Final   04/20/2020 139 133 - 144 mmol/L Final     INR   Date Value Ref Range Status   12/21/2022 1.36 (H) 0.85 - 1.15 Final   10/07/2019 1.20 (H) 0.86 - 1.14 Final       DIALYSIS PROCEDURE NOTE    Patient received cathflo to both CVC catheter lumens for 30 minutes instillation. After 30 minutes, bilateral lumens aspirating and flushing without resistance. Cathflo removed from line, lumens flushed and heplocked. Patient tolerated procedure well and returned to home unit. VSS.

## 2023-04-21 NOTE — PROGRESS NOTES
Assessment and Plan:   ESRD: 3h, 2L, 400 BFR, K protocol, 35 HCO3 and 140 Na. No heparin.   He is on midodrine for BP support.     Has LAF and R CVC.             Interval History:   Anemia: pancytopenia.  On EPO and oral folate.    Hepatic encephalopathy  Liver failure: S/P transplant, on tacrolimus.   Resp failure: resolved.  Afib: on apixaban.               Review of Systems:   No complaints during dialysis. Walking only with assistance.           Medications:       - MEDICATION INSTRUCTIONS for Dialysis Patients -   Does not apply See Admin Instructions     sodium chloride 0.9%  250 mL Intravenous Once in dialysis/CRRT     sodium chloride 0.9%  300 mL Hemodialysis Machine Once     apixaban ANTICOAGULANT  5 mg Oral BID     epoetin mirta-epbx  10,000 Units Intravenous Once in dialysis/CRRT     folic acid  1 mg Oral or Feeding Tube Daily     sodium chloride (PF) 0.9%  10 mL Intracatheter Once in dialysis/CRRT    Followed by     heparin  1.3-2.6 mL Intracatheter Once in dialysis/CRRT     sodium chloride (PF) 0.9%  10 mL Intracatheter Once in dialysis/CRRT    Followed by     heparin  1.3-2.6 mL Intracatheter Once in dialysis/CRRT     Lacosamide  100 mg Oral Q12H     lactulose  10 g Oral BID     levETIRAcetam  1,000 mg Oral Q24H     lidocaine   Transdermal Q8H BIJU     menthol   Transdermal Q8H     midodrine  10 mg Oral or Feeding Tube TID w/meals     multivitamin RENAL  1 capsule Oral Daily     - MEDICATION INSTRUCTIONS -   Does not apply Once     OLANZapine zydis  2.5 mg Oral Once per day on Mon Wed Fri     OLANZapine zydis  2.5 mg Oral At Bedtime     pantoprazole  40 mg Oral QAM AC     ramelteon  8 mg Oral QPM     rifaximin  550 mg Oral or Feeding Tube BID     rOPINIRole  0.5 mg Oral At Bedtime     sevelamer carbonate  800 mg Oral TID w/meals     sodium chloride (PF)  9 mL Intracatheter During Dialysis/CRRT (from stock)     sodium chloride (PF)  9 mL Intracatheter During Dialysis/CRRT (from stock)      tacrolimus  1 mg Oral Q12H     traZODone  25 mg Oral At Bedtime       - MEDICATION INSTRUCTIONS -       - MEDICATION INSTRUCTIONS -       Current active medications and PTA medications reviewed, see medication list for details.            Physical Exam:   Vitals were reviewed  Patient Vitals for the past 24 hrs:   BP Temp Temp src Pulse Resp SpO2   23 0930 110/68 -- -- 80 30 94 %   23 0928 110/68 -- -- 80 30 94 %   23 0915 106/68 -- -- 80 30 94 %   23 0900 101/63 -- -- 70 21 97 %   23 0845 113/67 -- -- 69 23 94 %   23 0759 97/60 97.2  F (36.2  C) Oral 78 17 95 %   23 97/52 98  F (36.7  C) Oral 75 16 97 %   23 1552 109/68 98  F (36.7  C) Oral 74 18 96 %       Temp:  [97.2  F (36.2  C)-98  F (36.7  C)] 97.2  F (36.2  C)  Pulse:  [69-80] 80  Resp:  [16-30] 30  BP: ()/(52-68) 110/68  SpO2:  [94 %-97 %] 94 %    Temperatures:  Current - Temp: 97.2  F (36.2  C); Max - Temp  Av.7  F (36.5  C)  Min: 97.2  F (36.2  C)  Max: 98  F (36.7  C)  Respiration range: Resp  Av.1  Min: 16  Max: 30  Pulse range: Pulse  Av.8  Min: 69  Max: 80  Blood pressure range: Systolic (24hrs), Av , Min:97 , Max:113   ; Diastolic (24hrs), Av, Min:52, Max:68    Pulse oximetry range: SpO2  Av.1 %  Min: 94 %  Max: 97 %    I/O last 3 completed shifts:  In: 150 [P.O.:150]  Out: -       Intake/Output Summary (Last 24 hours) at 2023 0980  Last data filed at 2023 0947  Gross per 24 hour   Intake 150 ml   Output --   Net 150 ml       Resting comfortably in bed  R CVC with no redness or tenderness  PEG in place, clamped.        Wt Readings from Last 4 Encounters:   23 80.3 kg (177 lb 1.6 oz)   23 82.4 kg (181 lb 11.2 oz)   22 96 kg (211 lb 10.3 oz)   22 100.2 kg (221 lb)          Data:          Lab Results   Component Value Date     2023     2023     2023     2020     10/07/2019    NA  137 02/20/2019    Lab Results   Component Value Date    CHLORIDE 100 04/20/2023    CHLORIDE 99 04/19/2023    CHLORIDE 97 04/18/2023    CHLORIDE 103 12/29/2022    CHLORIDE 103 12/28/2022    CHLORIDE 102 12/27/2022    CHLORIDE 105 08/05/2020    CHLORIDE 106 04/20/2020    CHLORIDE 99 10/07/2019    CHLORIDE 108 08/01/2019    CHLORIDE 106 02/20/2019    Lab Results   Component Value Date    BUN 69.0 04/20/2023    BUN 46.4 04/19/2023    BUN 69.9 04/18/2023    BUN 46 12/29/2022    BUN 62 12/28/2022    BUN 52 12/27/2022    BUN 23 04/20/2020    BUN 18 10/07/2019    BUN 20 02/20/2019      Lab Results   Component Value Date    POTASSIUM 4.4 04/20/2023    POTASSIUM 4.0 04/19/2023    POTASSIUM 4.2 04/18/2023    POTASSIUM 3.6 01/19/2023    POTASSIUM  01/18/2023      Comment:      Disregard results.      POTASSIUM 3.6 01/17/2023    POTASSIUM 3.4 12/29/2022    POTASSIUM 3.7 12/28/2022    POTASSIUM 3.7 12/28/2022    POTASSIUM 3.9 04/20/2020    POTASSIUM 4.3 10/07/2019    POTASSIUM 4.2 02/20/2019    Lab Results   Component Value Date    CO2 26 04/20/2023    CO2 28 04/19/2023    CO2 25 04/18/2023    CO2 31 12/29/2022    CO2 29 12/28/2022    CO2 29 12/27/2022    CO2 28 04/20/2020    CO2 26 10/07/2019    CO2 24 02/20/2019    Lab Results   Component Value Date    CR 5.34 04/20/2023    CR 4.11 04/19/2023    CR 5.62 04/18/2023    CR 1.06 04/20/2020    CR 1.01 10/07/2019    CR 1.08 02/20/2019        Recent Labs   Lab Test 04/14/23  0725 04/12/23  0755 04/08/23  0859   WBC 3.6* 2.9* 3.4*   HGB 8.9* 8.7* 9.0*   HCT 29.1* 28.7* 30.3*   MCV 97 97 101*   * 95* 95*     Recent Labs   Lab Test 04/05/23  0959 03/25/23  1150 03/14/23  1033 03/13/23  1454 03/12/23  1116 03/11/23  0846 03/10/23  1457   AST 16  --  20 21  --    < > 23   ALT <5*  --  8* 9*  --    < > 9*   ALKPHOS 184*  --  177* 182*  --    < > 231*   BILITOTAL 0.4  --  0.4 0.4  --    < > 0.4   CHANDLER  --  69*  --   --  64*  --  30    < > = values in this interval not displayed.        Recent Labs   Lab Test 04/18/23  0928 04/17/23  0750 04/16/23  1004   MAG 2.0 2.1 2.0     Recent Labs   Lab Test 04/20/23  1029 04/19/23  1433 04/18/23  0928   PHOS 5.1* 3.9  3.9 5.5*     Recent Labs   Lab Test 04/20/23  1029 04/19/23  1433 04/18/23  0928   KELLY 10.2* 9.4 10.3*       Lab Results   Component Value Date    KELLY 10.2 (H) 04/20/2023     Lab Results   Component Value Date    WBC 3.6 (L) 04/14/2023    HGB 8.9 (L) 04/14/2023    HCT 29.1 (L) 04/14/2023    MCV 97 04/14/2023     (L) 04/14/2023     Lab Results   Component Value Date     04/20/2023    POTASSIUM 4.4 04/20/2023    CHLORIDE 100 04/20/2023    CO2 26 04/20/2023     (H) 04/20/2023     Lab Results   Component Value Date    BUN 69.0 (H) 04/20/2023    CR 5.34 (H) 04/20/2023     Lab Results   Component Value Date    MAG 2.0 04/18/2023     Lab Results   Component Value Date    PHOS 5.1 (H) 04/20/2023       Creatinine   Date Value Ref Range Status   04/20/2023 5.34 (H) 0.67 - 1.17 mg/dL Final   04/19/2023 4.11 (H) 0.67 - 1.17 mg/dL Final   04/18/2023 5.62 (H) 0.67 - 1.17 mg/dL Final   04/17/2023 6.70 (H) 0.67 - 1.17 mg/dL Final   04/16/2023 5.55 (H) 0.67 - 1.17 mg/dL Final   04/15/2023 4.89 (H) 0.67 - 1.17 mg/dL Final   04/20/2020 1.06 0.66 - 1.25 mg/dL Final   10/07/2019 1.01 0.66 - 1.25 mg/dL Final   02/20/2019 1.08 0.66 - 1.25 mg/dL Final   07/10/2018 1.32 (H) 0.66 - 1.25 mg/dL Final   10/31/2017 1.18 0.66 - 1.25 mg/dL Final   10/17/2017 1.26 (H) 0.66 - 1.25 mg/dL Final       Attestation:  I have reviewed today's vital signs, notes, medications, labs and imaging.  Seen on dialysis.      Elvis Mariano MD

## 2023-04-21 NOTE — PROGRESS NOTES
DATE & TIME: 4/20/23 [id number]     Cognitive Concerns/ Orientation : Pt A/Ox4 this shift, disoriented to situation at times    BEHAVIOR & AGGRESSION TOOL COLOR: Green   ABNL VS/O2: VSS on RA, BID vitals  MOBILITY: Assist x1 with gait belt and walker, fall risk  PAIN MANAGMENT: Requested Tylenol and was offered.  DIET: Regular with mildly thick liquids  BOWEL/BLADDER: Continent/ Assisted to the BR but no BM.  ABNL LAB/BG:  DRAIN/DEVICES: R chest HD CVC, PEG tube clamped, L arm hemodialysis fistula  SKIN: Scattered bruising. Peeling of feet. Blanchable redness to coccyx.  TESTS/PROCEDURES: Dialysis MWF  D/C DATE: Discharge to TCU pending placement

## 2023-04-21 NOTE — TELEPHONE ENCOUNTER
A nurse call from [facility] wanting to schedule the patient for an appointment to establish care and hospital visit. Patient has been at the [facility] for the past 90 days and is needing to establish care with a provider so that he can be discharged home with home care. Patient has not been able to go to transitional care unit as his insurance will not cover it. Patient last apointment with primary care was in 2020 at [facility]. Patient is currently receiving dialysis M,W,F afternoon. Patient was scheduled for a hospital follow up on 4/26/23 with Ki Carter PA-C. Routing to provider as an FYI.    Carolina FUENTES RN  [facility] Triage Team

## 2023-04-22 ENCOUNTER — APPOINTMENT (OUTPATIENT)
Dept: PHYSICAL THERAPY | Facility: CLINIC | Age: 58
DRG: 981 | End: 2023-04-22
Attending: STUDENT IN AN ORGANIZED HEALTH CARE EDUCATION/TRAINING PROGRAM
Payer: COMMERCIAL

## 2023-04-22 LAB
ALBUMIN SERPL BCG-MCNC: 3.7 G/DL (ref 3.5–5.2)
ANION GAP SERPL CALCULATED.3IONS-SCNC: 10 MMOL/L (ref 7–15)
BUN SERPL-MCNC: 75.5 MG/DL (ref 6–20)
CALCIUM SERPL-MCNC: 10.5 MG/DL (ref 8.6–10)
CHLORIDE SERPL-SCNC: 99 MMOL/L (ref 98–107)
CREAT SERPL-MCNC: 6.16 MG/DL (ref 0.67–1.17)
DEPRECATED HCO3 PLAS-SCNC: 27 MMOL/L (ref 22–29)
GFR SERPL CREATININE-BSD FRML MDRD: 10 ML/MIN/1.73M2
GLUCOSE SERPL-MCNC: 134 MG/DL (ref 70–99)
PHOSPHATE SERPL-MCNC: 5.3 MG/DL (ref 2.5–4.5)
POTASSIUM SERPL-SCNC: 4.5 MMOL/L (ref 3.4–5.3)
SODIUM SERPL-SCNC: 136 MMOL/L (ref 136–145)
VIT B12 SERPL-MCNC: 989 PG/ML (ref 232–1245)

## 2023-04-22 PROCEDURE — 250N000013 HC RX MED GY IP 250 OP 250 PS 637: Performed by: STUDENT IN AN ORGANIZED HEALTH CARE EDUCATION/TRAINING PROGRAM

## 2023-04-22 PROCEDURE — 82607 VITAMIN B-12: CPT | Performed by: INTERNAL MEDICINE

## 2023-04-22 PROCEDURE — 97530 THERAPEUTIC ACTIVITIES: CPT | Mod: GP

## 2023-04-22 PROCEDURE — 99232 SBSQ HOSP IP/OBS MODERATE 35: CPT | Performed by: INTERNAL MEDICINE

## 2023-04-22 PROCEDURE — 80069 RENAL FUNCTION PANEL: CPT | Performed by: STUDENT IN AN ORGANIZED HEALTH CARE EDUCATION/TRAINING PROGRAM

## 2023-04-22 PROCEDURE — 97110 THERAPEUTIC EXERCISES: CPT | Mod: GP

## 2023-04-22 PROCEDURE — 120N000001 HC R&B MED SURG/OB

## 2023-04-22 PROCEDURE — 250N000013 HC RX MED GY IP 250 OP 250 PS 637: Performed by: HOSPITALIST

## 2023-04-22 PROCEDURE — 250N000012 HC RX MED GY IP 250 OP 636 PS 637: Performed by: STUDENT IN AN ORGANIZED HEALTH CARE EDUCATION/TRAINING PROGRAM

## 2023-04-22 PROCEDURE — 250N000013 HC RX MED GY IP 250 OP 250 PS 637: Performed by: PSYCHIATRY & NEUROLOGY

## 2023-04-22 PROCEDURE — 36415 COLL VENOUS BLD VENIPUNCTURE: CPT | Performed by: STUDENT IN AN ORGANIZED HEALTH CARE EDUCATION/TRAINING PROGRAM

## 2023-04-22 RX ADMIN — TACROLIMUS 1 MG: 1 CAPSULE ORAL at 20:05

## 2023-04-22 RX ADMIN — SEVELAMER CARBONATE 800 MG: 800 TABLET, FILM COATED ORAL at 08:14

## 2023-04-22 RX ADMIN — MIDODRINE HYDROCHLORIDE 10 MG: 5 TABLET ORAL at 13:00

## 2023-04-22 RX ADMIN — TACROLIMUS 1 MG: 1 CAPSULE ORAL at 08:14

## 2023-04-22 RX ADMIN — RIFAXIMIN 550 MG: 550 TABLET ORAL at 20:05

## 2023-04-22 RX ADMIN — RAMELTEON 8 MG: 8 TABLET, FILM COATED ORAL at 20:05

## 2023-04-22 RX ADMIN — Medication 1 CAPSULE: at 08:14

## 2023-04-22 RX ADMIN — ROPINIROLE HYDROCHLORIDE 0.5 MG: 0.25 TABLET, FILM COATED ORAL at 20:05

## 2023-04-22 RX ADMIN — TRAZODONE HYDROCHLORIDE 50 MG: 50 TABLET ORAL at 20:05

## 2023-04-22 RX ADMIN — LACTULOSE 10 G: 10 SOLUTION ORAL at 20:04

## 2023-04-22 RX ADMIN — PANTOPRAZOLE SODIUM 40 MG: 40 TABLET, DELAYED RELEASE ORAL at 08:14

## 2023-04-22 RX ADMIN — LACOSAMIDE 100 MG: 100 TABLET, FILM COATED ORAL at 20:05

## 2023-04-22 RX ADMIN — MIDODRINE HYDROCHLORIDE 10 MG: 5 TABLET ORAL at 08:14

## 2023-04-22 RX ADMIN — FOLIC ACID 1 MG: 1 TABLET ORAL at 08:14

## 2023-04-22 RX ADMIN — SEVELAMER CARBONATE 800 MG: 800 TABLET, FILM COATED ORAL at 17:09

## 2023-04-22 RX ADMIN — ACETAMINOPHEN 650 MG: 325 TABLET, FILM COATED ORAL at 09:10

## 2023-04-22 RX ADMIN — LACOSAMIDE 100 MG: 100 TABLET, FILM COATED ORAL at 09:10

## 2023-04-22 RX ADMIN — ACETAMINOPHEN 650 MG: 325 TABLET, FILM COATED ORAL at 21:35

## 2023-04-22 RX ADMIN — APIXABAN 5 MG: 5 TABLET, FILM COATED ORAL at 20:05

## 2023-04-22 RX ADMIN — SEVELAMER CARBONATE 800 MG: 800 TABLET, FILM COATED ORAL at 12:59

## 2023-04-22 RX ADMIN — LACTULOSE 10 G: 10 SOLUTION ORAL at 08:15

## 2023-04-22 RX ADMIN — RIFAXIMIN 550 MG: 550 TABLET ORAL at 08:14

## 2023-04-22 RX ADMIN — MIDODRINE HYDROCHLORIDE 10 MG: 5 TABLET ORAL at 17:09

## 2023-04-22 RX ADMIN — MELATONIN TAB 3 MG 3 MG: 3 TAB at 20:05

## 2023-04-22 RX ADMIN — LEVETIRACETAM 1000 MG: 500 TABLET, FILM COATED ORAL at 20:05

## 2023-04-22 RX ADMIN — APIXABAN 5 MG: 5 TABLET, FILM COATED ORAL at 08:15

## 2023-04-22 ASSESSMENT — ACTIVITIES OF DAILY LIVING (ADL)
ADLS_ACUITY_SCORE: 52
ADLS_ACUITY_SCORE: 48
ADLS_ACUITY_SCORE: 52
ADLS_ACUITY_SCORE: 48
ADLS_ACUITY_SCORE: 52

## 2023-04-22 NOTE — PLAN OF CARE
DATE & TIME: 4/21/23, 1900 - 0730   Cognitive Concerns/ Orientation : A&O x 3, disoriented to situation   BEHAVIOR & AGGRESSION TOOL COLOR: Green   ABNL VS/O2: Vital signs done twice daily, deferred overnight  MOBILITY: SBA with GB and walker  PAIN MANAGMENT: Prn Tylenol given for back pain  DIET: Regular with mildly thickened liquid  BOWEL/BLADDER: Continent of bladder/bowel  ABNL LAB/BG: Refused lab draw overnight  DRAIN/DEVICES: Right chest tunneled CVC for dialysis. AV fistula to left arm. PEG tube clamped.  TELEMETRY RHYTHM: NA  SKIN: Mild blanchable redness to coccyx. Scattered bruising, Peeling to feet. Scattered bruising  TESTS/PROCEDURES: Had dialysis yesterday  D/C DATE: Discharge to TCU pending placement

## 2023-04-22 NOTE — PLAN OF CARE
Goal Outcome Evaluation:     DATE & TIME: 4/21/23 [id number]     Cognitive Concerns/ Orientation : A&Ox3-4 wife was by the bedside this afternoon.  BEHAVIOR & AGGRESSION TOOL COLOR: Green   ABNL VS/O2: VSS on RA, soft BP, BID vitals  MOBILITY: Assist x1 with gait belt and walker, fall risk  PAIN MANAGMENT: Tylenol given this morning before dialysis for back pain.  DIET: Regular with mildly thick liquids  BOWEL/BLADDER: Continent, walks to bathroom, 1 BM reported for this shift.  ABNL LAB/BG: No new lab  DRAIN/DEVICES: R chest HD CVC, PEG tube clamped, L arm hemodialysis fistula  SKIN: Scattered bruising. Peeling of feet. Blanchable redness to coccyx.  TESTS/PROCEDURES: Dialysis MWF, completed this morning.  D/C DATE: Discharge to TCU pending placement

## 2023-04-22 NOTE — PROGRESS NOTES
[facility]  Hospitalist Progress Note    Admit Date:  1/21/2023  Date of Service (when I saw the patient): 04/22/2023   Provider:  Madelyn Singletary, DO    Assessment & Plan      Blanco Osborne is a 57 year-old male admitted on 1/21/2023 as a transfer from [facility] for evaluation of loculated pleural effusion. He has extensive PMH including h/o liver transplant 2016 due to alcohol abuse, pAF on chronic anticoagulation, history of diabetes mellitus type 2, CVA, hypertension, CHRISTIAN on BiPAP.  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 He was recovering in [facility] and was transferred to [facility] for consideration of chest tube placement and possible intrapleural lysis for worsening effusion    Problem List:    1. Acute metabolic encephalopathy with delirium, multifactorial, -resolving and now more chronic, hepatic encephalopathy  Chronic liver disease, history of liver transplant 2016  ESRD on HD  History of seizure, on Keppra and Vimpat    Continuing to have waxing and waning mentation,  Earlier in hospital stay, remained confused and had pulled out tracheostomy tube, PICC line and pulled his dialysis catheter- replaced.    Mental status continues to fluctuate with periods of alertness and increased sleepiness but without agitation for several days and overall improving.  Off restraints and sitter for several days.    Complicated, prolonged hospital course with mental status concerns multifactorial related to hepatic encephalopathy, end-stage renal disease on dialysis, past PEA arrest, seizure disorder, medication and chronic liver disease with prior liver transplant.     Continue to reorient and redirect as able. Maintain normal day/night cycles and sleep-wake cycles.   Minimize sedating medications as able.  Remains weak and deconditioned with complex, prolonged hospital course and limited mobility.  Encouragement and support offered daily to patient.    Continue Lactulose to 10 mg BID, monitor for symptoms; goal to have 2-3 bowel movements per day.    Ongoing hemodialysis, as per nephrology, 3X/week dialysis schedule. Nephrology following    Continue ramelteon 8 mg at bedtime; monitor for side effects including AM sedation, reassess daily.    Started on olanzapine 5 mg BID and PRN earlier during this hospitalization. Delirium improved/resolved. Now scheduled HS dose discontinued. Plan to stop the doses with HD as well in upcoming days. Psychiatry recommended  trazodone 25 mg at bedtime, increased to 50 mg. Melatonin as well added.     Continue Keppra and Vimpat. No recurrent seizures reported of recent.     Neurology consult appreciated.  Patient has been started on ropinirole for restless leg syndrome.     Family stating that he is doing well with weaning down on zyprexa  -----currently receiving doses 3x's/week    2. Bilateral pleural effusions   Acute respiratory failure requiring tracheostomy  - resolved    Pneumonia  - resolved   ARDS  - resolved    Right pneumothorax - resolved   *Initial event was parainfluenza and strep pneumo pneumonia back in November 2022. Treated for ARDS. Had failed extubation x2 and tracheostomy performed on previous hospitalization. *Had additional complications of bilateral pneumothoraces as well as hydropneumothorax- pleural fluid grew candida parapsilosis  *Completed 4-week antifungal treatment. While in LTACH he was successfully weaned from the ventilator.  *Recently there were concern for worsening effusion while at LTACH and had thoracentesis 01/13 which returned exudative without growth on cultures. CT chest/abdomen/pelvis on 01/18 demonstrated worsening effusions and concerns for infected parapneumonic effusions with possible SBP.   Multiple pulmonologist at LTACH reviewed CT scan recommended transfer to [facility] for consideration of chest tube placement and possible intrapleural lysis  *Thoracic surgery (Dr. Jackson) consulted during admission. CT chest small effusions on imaging and so chest tube was not inserted.   * ID and pulmonary as well consulted this stay and have since signed off. No abx.   *Patient pulled out his tracheostomy tube on the night 2/1-2/2 and is tolerating well without increased shortness of breath persistent cough or worsening hypoxia.     Stable on room air now; occasional cough reported.    Encourage incentive spirometry as able, up to chair, deep breathing.    Tracheostomy site has healed now        3.  Possible splenic infract  Wedge shaped area on CT scan chest and CT abdomen on 3/7/23.    Hematology consulted, suspicion for infarct was low.    TTE no thrombus or vegetations.    No abdominal pain CTM.     4. S/p liver transplant 2016   Chronic immunosuppression, on tacrolimus  Cirrhosis with ascites  Subacute bacterial peritonitis (SBP), resolved  *Main manifestations of this are hepatic encephalopathy and ascites.  Hepatologist at [facility] felt that most likely reason for the cirrhosis was metabolic syndrome or alcohol intake.  There was no evidence of rejection on biopsy and there was some evidence of regeneration. Paracentesis done on 12/22/2022 showed lactobacillus indicating SBP, treated with Unasyn and Augmentin, finished 2-week course 1/12/2023.  Requires large-volume paracentesis periodically for recurring ascites.    *Paracentesis 1/13/2023 yielded about 7500 cc. Cultures NGTD. paracentesis on 1/24 with 4.8 L fluid removal  Paracentesis on 3/6/23 No SBP    CT abdomen 3/9 small to moderate ascites. Continue intermittent paracentesis as needed if develops symptomatic ascites.    Monitor for signs and symptoms of recurrent spontaneous bacterial peritonitis.    Further treatment for recurrent ascites with  "TIPS deferred to his Liver Transplant team and/or Hepatology, he had an appointment on 4/21/2023 with Hepatology, Dr. Simms but needs to reschedule given ongoing hosptial stay.    Continue Tacrolimus 1 mg BID, rifaximin 550 mg BID.    Continue lactulose as ordered, titrate as needed to have 2-3 BMs    GI consult as needed in hospital for new acute issues, otherwise as outpatient.    Encourgae high protein diet.     5. Goals of care   As per prior rounding provider, \"on 1/25 nursing had mentioned that patient had refused to undergo dialysis and want to stop care, palliative care was consulted.  Patient and his spouse denied wanting to stop care and wanted ongoing restorative care including full code\"    Full Code status.    Needs placement to TCU -> SW working on placement. Discussed with pt and wife at bedside  -  There have been some  Financial issues with  Some of his chronic meds for TCU acceptance     6. Diarrhea, improved, on lactulose for chronic liver disease    C difficile negative on 1/31. Secondary to lactulose. Discontinued rectal tube (2/12) as stools more formed.    Continue lactulose with goal of 2 to 3 soft bowel movement in 24 hours.     7. Paroxysmal atrial fibrillation  Chronic anticoagulation, apixaban  History of embolic strokes  Remains in sinus rhythm, on anticoagulation with apixaban indefinitely for stroke prophylaxis.      Continue apixaban anticoagulation, it was briefly held for fistula placement.    Monitor hemoglobin, see below.     8. Acute anemia, normocytic  Patient has required intermittent transfusions for on-going anemia.  Anemia most likely secondary to critical illness/chronic disease, chronic renal disease.  Baseline hemoglobin around 7-8.  Likely Epo resistance.    Hemoglobin fairly stable now between 8-9    Last hgb 8.9 (4/14/23)    Family would like a vit B12 level drawn  (can do as an add-on)    On daily folate supplements    Monitor intermittently.     9. " Hypercalcemia  Work-up shows low/low normal vitamin D, suppressed PTH.  But PTH related peptide is elevated.    Nephrology following and considered prolia, however patient's wife would like to hold off for now.    Holding VIT D and Phoslo.     Discussed with Dr Moulton regarding elevated PTHrP, suspected due to prolonged immobility. Plan is to interval follow up in a month or so. If trends up, then will plan forther work up.      10. History PEA arrest   PEA arrest prior to his previous admission and 1 episode of PEA associated with desaturation on 12/20.  No structural cardiac disease.      11. Chronic Hypotension  In the past has developed baseline hypotension with cirrhosis and prolonged critical illness.  On hemodialysis.  Receiving midodrine and albumin during dialysis.    Continue midodrine       12. History of seizure   After hypoxic event, in the context of baseline multifactorial encephalopathy secondary to his ongoing critical illness and prior cardiac arrests and prior strokes and cirrhosis with hepatic encephalopathy. MRI of head of 12/20/22 reveals no PRES or leptomeningeal enhancement but scattered.  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   No recent seizure activity.    Continue levetiracetam, lacosamide.    Seizure precautions.    Outpatient follow-up with neurology      13. ESRD  Anemia due to renal disease  Hypotension during dialysis  Hyperphosphatemia    Nephrology reviewing vein mapping to assess options for internal access placement for dialysis, see note 3/16.    Underwent Fistula placement on  3/21/22.    Vascular surgery following-continue to use for tunnel catheter.  Outpatient follow-up with vascular surgery and for will need a follow-up ultrasound of the fistula in 6 weeks to assess patency.    Started sevalmer 800mg TID with meals on 3/27/23.    Complained of cramping with dialysis. Tried a dose of oxycodone prior to dialysis but  this was not helpful. Spouse prefers to avoid narcotic given encephalopathy.      14. Diabetes mellitus type 2  *Hemoglobin A1c on November 2022 was 4.7  *By report, on Lantus insulin in the past.  Blood sugar checks and sliding scale insulin previously discontinued as has been stable without insulin needs.    Monitor with periodic blood sugar checks as needed.     15.Hypomagnesemia    Resolved with replacement.     16. Severe malnutrition, protein and calorie type  Moderate dysphagia  G-tube feedings    Continue with tube feeds via PEG tube.    IR replaced the PEG tube on 2/8/2023, monitor.    Nutritionist consulted and following for tube feeds.    SLP following as well. Oral diet as per speech and language therapy (SLP). Patient hopeful to advance to thin liquids soon.    2/20 Nutrition following for tube feeds.    Now undergoing PRN tube feeds.    Encourage high-protein diet.    Nutrition notes reviewed, patient eating 0 to 100% of meals.  Continue current interventions.  As needed tube feed boluses available if patient consumes less than 50% of the meal.    Family encouraging patient to eat high protein diet including protein bars    Family had asked about g tube removal 4/21/23.  Per the EMR, last tube feed bolus was 3/22 if adequately documented. He refuses his tube feeds often per the dietitician. At this point, would favor keeping it in place given overall poor prognosis with ESRD, cirrhosis of transplanted liver, persistent encephalopathy, and inadequate oral intake. Event if he refuses, he is at risk for developing new infection or other decompensation and thus g tube as safety net.  -  This can continue to be re-addressed in f/up if he continues to do well and  There is really no need for it.     17. Anxiety  Fluoxetine was discontinued as per psychiatry recommendation on 2/2 (see consult note for details).    Psychiatry follow-up.     18. Restless leg  Syndrome    Patient started on ropiranole by  neurology.     Medical Decision Making   42 min spent today in direct pt care, chart review and family conferencing with family in the room.     Labs/Imaging Reviewed:  See Information above and Data section below      DVT Prophylaxis: DOAC  Nixon Catheter: Not present  Code Status: Full Code      Disposition Plan   Home with family  Vs. TCU     Entered: Madelyn Singletary DO 04/22/2023, 7:20 AM       The patient's care was discussed with the Patient and Patient's Family.    Interval History     Seen with wife at bedside.  Getting up with min assistance and walker to the bathroom.  Currently no c/o CP, SOB, F/C or N/V.   No current HA.   Appetite ok.   Tired and difficulty to sleep.  Wife wondering if he has ever had a vitamin B12 level drawn.    -Data reviewed today: I reviewed all new labs and imaging results over the last 24 hours. I personally reviewed no images or EKG's today.    Physical Exam   Temp: 98.6  F (37  C) Temp src: Oral BP: 110/66 Pulse: 70   Resp: 18 SpO2: 99 % O2 Device: None (Room air)    Vitals:    04/18/23 0343 04/19/23 0634 04/20/23 0645   Weight: 80.4 kg (177 lb 3.2 oz) 80.8 kg (178 lb 3.2 oz) 80.3 kg (177 lb 1.6 oz)     Vital Signs with Ranges  Temp:  [97.2  F (36.2  C)-98.6  F (37  C)] 98.6  F (37  C)  Pulse:  [69-80] 70  Resp:  [12-30] 18  BP: ()/(60-82) 110/66  SpO2:  [93 %-99 %] 99 %  No intake/output data recorded.    GEN:  Alert, appears slightly older than his stated age  HEENT:  Normocephalic/atraumatic, no signfiicant scleral icterus, no nasal discharge, mouth and membranes appear moist  CV:  Somewhat distant but regular rate and rhythm, no clear  loud murmur to ausc.  S1 + S2 noted, no S3 or S4.  LUNGS:  Clear to auscultation ant/lat bilaterally; post exam limited this am d/t body positioning and urgent need to get up to bathroom before post exam. No costal retractions bilaterally.  Symmetric chest rise on inhalation noted.  ABD:  Active bowel sounds, soft, non-tender,  mod distended. g tube site clean and dry.  No clear rebound/guarding/rigidity.  No obvious masses palpated.  .  EXT:  Trace pretibial edema noted bilaterally, no cyanosis bilaterally.   SKIN:  Dry to touch, no rashes or jaundice noted.  PSYCH:  Overall cooperative but appears to be intermittently agitated during my exam and history taking  NEURO: intermittent, mild bilateral hand tremors at rest , speech is clear but confused at times.    Data   Labs:  Recent Labs   Lab 04/22/23  0821 04/20/23  1029 04/19/23  1433    138 138   POTASSIUM 4.5 4.4 4.0   CHLORIDE 99 100 99   CO2 27 26 28   ANIONGAP 10 12 11   * 121* 124*   BUN 75.5* 69.0* 46.4*   CR 6.16* 5.34* 4.11*   GFRESTIMATED 10* 12* 16*   KELLY 10.5* 10.2* 9.4     No results for input(s): WBC, HGB, HCT, MCV, PLT in the last 168 hours.  Recent Labs   Lab 04/22/23  0821 04/20/23  1029 04/19/23  1433 04/18/23  0928 04/17/23  0750 04/16/23  2123 04/16/23  1004    138 138 136 135*  --  134*   POTASSIUM 4.5 4.4 4.0 4.2 5.1  --  4.3   CHLORIDE 99 100 99 97* 96*  --  94*   CO2 27 26 28 25 24  --  25   ANIONGAP 10 12 11 14 15  --  15   * 121* 124* 124* 109*   < > 125*   BUN 75.5* 69.0* 46.4* 69.9* 93.4*  --  78.6*   CR 6.16* 5.34* 4.11* 5.62* 6.70*  --  5.55*   GFRESTIMATED 10* 12* 16* 11* 9*  --  11*   KELLY 10.5* 10.2* 9.4 10.3* 10.4*  --  10.5*   MAG  --   --   --  2.0 2.1  --  2.0   PHOS 5.3* 5.1* 3.9  3.9 5.5* 6.6*  --  5.9*   ALBUMIN 3.7 3.7 3.7 3.7 3.5  --  3.7    < > = values in this interval not displayed.     No results for input(s): CKT in the last 168 hours.    Invalid input(s): CK, CK TOTAL  No results for input(s): COLOR, APPEARANCE, URINEGLC, URINEBILI, URINEKETONE, SG, UBLD, URINEPH, PROTEIN, UROBILINOGEN, NITRITE, LEUKEST, RBCU, WBCU in the last 168 hours.   Recent Imaging:   No results found for this or any previous visit (from the past 24 hour(s)).    Medications     - MEDICATION INSTRUCTIONS -       - MEDICATION INSTRUCTIONS -          - MEDICATION INSTRUCTIONS for Dialysis Patients -   Does not apply See Admin Instructions     apixaban ANTICOAGULANT  5 mg Oral BID     folic acid  1 mg Oral or Feeding Tube Daily     Lacosamide  100 mg Oral Q12H     lactulose  10 g Oral BID     levETIRAcetam  1,000 mg Oral Q24H     lidocaine   Transdermal Q8H BIJU     melatonin  3 mg Oral At Bedtime     menthol   Transdermal Q8H     midodrine  10 mg Oral or Feeding Tube TID w/meals     multivitamin RENAL  1 capsule Oral Daily     OLANZapine zydis  2.5 mg Oral Once per day on Mon Wed Fri     pantoprazole  40 mg Oral QAM AC     ramelteon  8 mg Oral QPM     rifaximin  550 mg Oral or Feeding Tube BID     rOPINIRole  0.5 mg Oral At Bedtime     sevelamer carbonate  800 mg Oral TID w/meals     tacrolimus  1 mg Oral Q12H     traZODone  50 mg Oral At Bedtime

## 2023-04-22 NOTE — PLAN OF CARE
Goal Outcome Evaluation:     DATE & TIME: 4/22/23, 8878-1616           Cognitive Concerns/ Orientation : A&Ox3 disoriented to situation   BEHAVIOR & AGGRESSION TOOL COLOR: Green, son and spouse by the bedside.    ABNL VS/O2: Vital signs stable, soft BP, done twice daily,   MOBILITY: SBA with GB and walker  PAIN MANAGMENT: PRN Tylenol given for back pain  DIET: Regular with mildly thickened liquid  BOWEL/BLADDER: Continent of bladder/bowel  ABNL LAB/BG:   DRAIN/DEVICES: Right chest tunneled CVC for dialysis. AV fistula to left arm. PEG tube clamped.  TELEMETRY RHYTHM: NA  SKIN: Mild blanchable redness to coccyx. Scattered bruising, Peeling to feet. Scattered bruising  TESTS/PROCEDURES: Had dialysis yesterday  D/C DATE: Discharge plan possible on Tuesday, April 25 per family. No SW note regarding patient is going home nest week.         Xolair Pregnancy And Lactation Text: This medication is Pregnancy Category B and is considered safe during pregnancy. This medication is excreted in breast milk.

## 2023-04-23 ENCOUNTER — APPOINTMENT (OUTPATIENT)
Dept: PHYSICAL THERAPY | Facility: CLINIC | Age: 58
DRG: 981 | End: 2023-04-23
Attending: STUDENT IN AN ORGANIZED HEALTH CARE EDUCATION/TRAINING PROGRAM
Payer: COMMERCIAL

## 2023-04-23 LAB
ALBUMIN SERPL BCG-MCNC: 3.5 G/DL (ref 3.5–5.2)
ANION GAP SERPL CALCULATED.3IONS-SCNC: 14 MMOL/L (ref 7–15)
BUN SERPL-MCNC: 85.8 MG/DL (ref 6–20)
CALCIUM SERPL-MCNC: 10.4 MG/DL (ref 8.6–10)
CHLORIDE SERPL-SCNC: 98 MMOL/L (ref 98–107)
CREAT SERPL-MCNC: 7.1 MG/DL (ref 0.67–1.17)
DEPRECATED HCO3 PLAS-SCNC: 25 MMOL/L (ref 22–29)
FOLATE SERPL-MCNC: >40 NG/ML (ref 4.6–34.8)
GFR SERPL CREATININE-BSD FRML MDRD: 8 ML/MIN/1.73M2
GLUCOSE SERPL-MCNC: 108 MG/DL (ref 70–99)
HGB BLD-MCNC: 8.4 G/DL (ref 13.3–17.7)
LACOSAMIDE SERPL-MCNC: 7.8 UG/ML
PHOSPHATE SERPL-MCNC: 6.6 MG/DL (ref 2.5–4.5)
POTASSIUM SERPL-SCNC: 4.5 MMOL/L (ref 3.4–5.3)
SODIUM SERPL-SCNC: 137 MMOL/L (ref 136–145)

## 2023-04-23 PROCEDURE — 85018 HEMOGLOBIN: CPT | Performed by: STUDENT IN AN ORGANIZED HEALTH CARE EDUCATION/TRAINING PROGRAM

## 2023-04-23 PROCEDURE — 250N000013 HC RX MED GY IP 250 OP 250 PS 637: Performed by: STUDENT IN AN ORGANIZED HEALTH CARE EDUCATION/TRAINING PROGRAM

## 2023-04-23 PROCEDURE — 250N000012 HC RX MED GY IP 250 OP 636 PS 637: Performed by: STUDENT IN AN ORGANIZED HEALTH CARE EDUCATION/TRAINING PROGRAM

## 2023-04-23 PROCEDURE — 82746 ASSAY OF FOLIC ACID SERUM: CPT | Performed by: INTERNAL MEDICINE

## 2023-04-23 PROCEDURE — 36415 COLL VENOUS BLD VENIPUNCTURE: CPT | Performed by: STUDENT IN AN ORGANIZED HEALTH CARE EDUCATION/TRAINING PROGRAM

## 2023-04-23 PROCEDURE — 97116 GAIT TRAINING THERAPY: CPT | Mod: GP

## 2023-04-23 PROCEDURE — 80069 RENAL FUNCTION PANEL: CPT | Performed by: STUDENT IN AN ORGANIZED HEALTH CARE EDUCATION/TRAINING PROGRAM

## 2023-04-23 PROCEDURE — 250N000013 HC RX MED GY IP 250 OP 250 PS 637: Performed by: PSYCHIATRY & NEUROLOGY

## 2023-04-23 PROCEDURE — 120N000001 HC R&B MED SURG/OB

## 2023-04-23 PROCEDURE — 97530 THERAPEUTIC ACTIVITIES: CPT | Mod: GP

## 2023-04-23 PROCEDURE — 99232 SBSQ HOSP IP/OBS MODERATE 35: CPT | Performed by: INTERNAL MEDICINE

## 2023-04-23 PROCEDURE — 97110 THERAPEUTIC EXERCISES: CPT | Mod: GP

## 2023-04-23 PROCEDURE — 250N000013 HC RX MED GY IP 250 OP 250 PS 637: Performed by: HOSPITALIST

## 2023-04-23 RX ADMIN — MIDODRINE HYDROCHLORIDE 10 MG: 5 TABLET ORAL at 18:19

## 2023-04-23 RX ADMIN — MIDODRINE HYDROCHLORIDE 10 MG: 5 TABLET ORAL at 13:22

## 2023-04-23 RX ADMIN — Medication 1 CAPSULE: at 08:27

## 2023-04-23 RX ADMIN — LEVETIRACETAM 1000 MG: 500 TABLET, FILM COATED ORAL at 21:29

## 2023-04-23 RX ADMIN — RAMELTEON 8 MG: 8 TABLET, FILM COATED ORAL at 21:30

## 2023-04-23 RX ADMIN — SEVELAMER CARBONATE 800 MG: 800 TABLET, FILM COATED ORAL at 18:19

## 2023-04-23 RX ADMIN — LACTULOSE 10 G: 10 SOLUTION ORAL at 21:30

## 2023-04-23 RX ADMIN — PANTOPRAZOLE SODIUM 40 MG: 40 TABLET, DELAYED RELEASE ORAL at 08:26

## 2023-04-23 RX ADMIN — SEVELAMER CARBONATE 800 MG: 800 TABLET, FILM COATED ORAL at 08:28

## 2023-04-23 RX ADMIN — MIDODRINE HYDROCHLORIDE 10 MG: 5 TABLET ORAL at 08:27

## 2023-04-23 RX ADMIN — ACETAMINOPHEN 650 MG: 325 TABLET, FILM COATED ORAL at 08:28

## 2023-04-23 RX ADMIN — ROPINIROLE HYDROCHLORIDE 0.5 MG: 0.25 TABLET, FILM COATED ORAL at 21:29

## 2023-04-23 RX ADMIN — RIFAXIMIN 550 MG: 550 TABLET ORAL at 21:29

## 2023-04-23 RX ADMIN — LACOSAMIDE 100 MG: 100 TABLET, FILM COATED ORAL at 21:30

## 2023-04-23 RX ADMIN — MELATONIN TAB 3 MG 3 MG: 3 TAB at 21:29

## 2023-04-23 RX ADMIN — SIMETHICONE 80 MG: 80 TABLET, CHEWABLE ORAL at 15:58

## 2023-04-23 RX ADMIN — FOLIC ACID 1 MG: 1 TABLET ORAL at 08:28

## 2023-04-23 RX ADMIN — ACETAMINOPHEN 650 MG: 325 TABLET, FILM COATED ORAL at 15:58

## 2023-04-23 RX ADMIN — RIFAXIMIN 550 MG: 550 TABLET ORAL at 08:27

## 2023-04-23 RX ADMIN — ACETAMINOPHEN 650 MG: 325 TABLET, FILM COATED ORAL at 22:03

## 2023-04-23 RX ADMIN — TACROLIMUS 1 MG: 1 CAPSULE ORAL at 08:26

## 2023-04-23 RX ADMIN — SEVELAMER CARBONATE 800 MG: 800 TABLET, FILM COATED ORAL at 13:22

## 2023-04-23 RX ADMIN — TACROLIMUS 1 MG: 1 CAPSULE ORAL at 21:29

## 2023-04-23 RX ADMIN — APIXABAN 5 MG: 5 TABLET, FILM COATED ORAL at 08:29

## 2023-04-23 RX ADMIN — LACOSAMIDE 100 MG: 100 TABLET, FILM COATED ORAL at 08:27

## 2023-04-23 RX ADMIN — APIXABAN 5 MG: 5 TABLET, FILM COATED ORAL at 21:29

## 2023-04-23 ASSESSMENT — ACTIVITIES OF DAILY LIVING (ADL)
ADLS_ACUITY_SCORE: 48
ADLS_ACUITY_SCORE: 52
ADLS_ACUITY_SCORE: 51
ADLS_ACUITY_SCORE: 48

## 2023-04-23 NOTE — PLAN OF CARE
DATE & TIME: 4/22/23, 8615-0185      Cognitive Concerns/ Orientation : A&Ox3 disoriented to situation   BEHAVIOR & AGGRESSION TOOL COLOR: Green     ABNL VS/O2: Vital signs stable, soft BP, done twice daily-on scheduled midodrine  MOBILITY: SBA with GB and walker  PAIN MANAGMENT: PRN Tylenol given x1 for back pain  DIET: Regular with mildly thickened liquid, good appetite  BOWEL/BLADDER: Continent of bladder/bowel, urinary frequency  ABNL LAB/BG: UN 75.5, Cr 6.16, Ca 10.5, Phos 5.3  DRAIN/DEVICES: Right chest tunneled CVC for dialysis. AV fistula to left arm. PEG tube clamped.  TELEMETRY RHYTHM: NA  SKIN: Mild blanchable redness to coccyx. Scattered bruising, Peeling to feet. Scattered bruising  TESTS/PROCEDURES: Had dialysis yesterday  D/C DATE: Discharge to TCU pending placement

## 2023-04-23 NOTE — PLAN OF CARE
DATE & TIME: 04/22/23 Night    Cognitive Concerns/ Orientation: Alert/Oriented x 3, disoriented to situation   BEHAVIOR & AGGRESSION TOOL COLOR: Green     ABNL VS/O2: Twice daily vitals-on scheduled midodrine, room air  MOBILITY: SBA gait belt/walker  PAIN MANAGMENT: Declines, PRN tylenol  DIET: Regular with mildly thickened liquid  BOWEL/BLADDER: Continent of bladder/bowel, bathroom fequency, no BM  ABNL LAB/BG: BUN 75.5, Cr 6.16, Ca 10.5, Phos 5.3  DRAIN/DEVICES: Right chest tunneled CVC for dialysis; AV fistula to left arm-thrill/bruit present; PEG tube clamped  SKIN: Mild blanchable redness to coccyx. Scattered bruising, Peeling to feet. Scattered bruising  TESTS/PROCEDURES: Dialysis schedule MWF  D/C DATE: Discharge to TCU pending placement

## 2023-04-23 NOTE — PROGRESS NOTES
[facility]  Hospitalist Progress Note    Admit Date:  1/21/2023  Date of Service (when I saw the patient): 04/23/2023   Provider:  Madelyn Singletary, DO    Assessment & Plan      Blanco Osborne is a 57 year-old male admitted on 1/21/2023 as a transfer from [facility] for evaluation of loculated pleural effusion. He has extensive PMH including h/o liver transplant 2016 due to alcohol abuse, pAF on chronic anticoagulation, history of diabetes mellitus type 2, CVA, hypertension, CHRISTIAN on BiPAP.  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 He was recovering in [facility] and was transferred to [facility] for consideration of chest tube placement and possible intrapleural lysis for worsening effusion    Problem List:    1. Acute metabolic encephalopathy with delirium, multifactorial, -resolving and now more chronic, hepatic encephalopathy  Chronic liver disease, history of liver transplant 2016  ESRD on HD  History of seizure, on Keppra and Vimpat    Continuing to have waxing and waning mentation,  Earlier in hospital stay, remained confused and had pulled out tracheostomy tube, PICC line and pulled his dialysis catheter- replaced.    Mental status continues to fluctuate with periods of alertness and increased sleepiness but without agitation for several days and overall improving.  Off restraints and sitter for several days.    Complicated, prolonged hospital course with mental status concerns multifactorial related to hepatic encephalopathy, end-stage renal disease on dialysis, past PEA arrest, seizure disorder, medication and chronic liver disease with prior liver transplant.     Continue to reorient and redirect as able. Maintain normal day/night cycles and sleep-wake cycles.   Minimize sedating medications as able.  Remains weak and deconditioned with complex, prolonged hospital course and limited mobility.  Encouragement and support offered daily to patient.    Continue Lactulose to 10 mg BID, monitor for symptoms; goal to have 2-3 bowel movements per day.    Ongoing hemodialysis, as per nephrology, 3X/week dialysis schedule. Nephrology following    Continue ramelteon 8 mg at bedtime; monitor for side effects including AM sedation, reassess daily.    Started on olanzapine 5 mg BID and PRN earlier during this hospitalization. Delirium improved/resolved. Now scheduled HS dose discontinued. Plan to stop the doses with HD as well in upcoming days. Psychiatry recommended  trazodone 25 mg at bedtime, increased to 50 mg. Melatonin as well added.     Continue Keppra and Vimpat. No recurrent seizures reported of recent.     Neurology consult appreciated.  Patient has been started on ropinirole for restless leg syndrome.     Family stating that he is doing well with weaning down on zyprexa  -----currently receiving doses 3x's/week    2. Bilateral pleural effusions   Acute respiratory failure requiring tracheostomy  - resolved    Pneumonia  - resolved   ARDS  - resolved    Right pneumothorax - resolved   *Initial event was parainfluenza and strep pneumo pneumonia back in November 2022. Treated for ARDS. Had failed extubation x2 and tracheostomy performed on previous hospitalization. *Had additional complications of bilateral pneumothoraces as well as hydropneumothorax- pleural fluid grew candida parapsilosis  *Completed 4-week antifungal treatment. While in LTACH he was successfully weaned from the ventilator.  *Recently there were concern for worsening effusion while at LTACH and had thoracentesis 01/13 which returned exudative without growth on cultures. CT chest/abdomen/pelvis on 01/18 demonstrated worsening effusions and concerns for infected parapneumonic effusions with possible SBP.   Multiple pulmonologist at LTACH reviewed CT scan recommended transfer to [facility] for consideration of chest tube placement and possible intrapleural lysis  *Thoracic surgery (Dr. Jackson) consulted during admission. CT chest small effusions on imaging and so chest tube was not inserted.   * ID and pulmonary as well consulted this stay and have since signed off. No abx.   *Patient pulled out his tracheostomy tube on the night 2/1-2/2 and is tolerating well without increased shortness of breath persistent cough or worsening hypoxia.     Stable on room air now; occasional cough reported.    Encourage incentive spirometry as able, up to chair, deep breathing.    Tracheostomy site has healed now        3.  Possible splenic infract  Wedge shaped area on CT scan chest and CT abdomen on 3/7/23.    Hematology consulted, suspicion for infarct was low.    TTE no thrombus or vegetations.    No abdominal pain CTM.     4. S/p liver transplant 2016   Chronic immunosuppression, on tacrolimus  Cirrhosis with ascites  Subacute bacterial peritonitis (SBP), resolved  *Main manifestations of this are hepatic encephalopathy and ascites.  Hepatologist at [place] felt that most likely reason for the cirrhosis was metabolic syndrome or alcohol intake.  There was no evidence of rejection on biopsy and there was some evidence of regeneration. Paracentesis done on 12/22/2022 showed lactobacillus indicating SBP, treated with Unasyn and Augmentin, finished 2-week course 1/12/2023.  Requires large-volume paracentesis periodically for recurring ascites.    *Paracentesis 1/13/2023 yielded about 7500 cc. Cultures NGTD. paracentesis on 1/24 with 4.8 L fluid removal  Paracentesis on 3/6/23 No SBP    CT abdomen 3/9 small to moderate ascites. Continue intermittent paracentesis as needed if develops symptomatic ascites.    Monitor for signs and symptoms of recurrent spontaneous bacterial peritonitis.    Further treatment for recurrent ascites with  "TIPS deferred to his Liver Transplant team and/or Hepatology, he had an appointment on 4/21/2023 with Hepatology, Dr. Simms but needs to reschedule given ongoing hosptial stay.    Continue Tacrolimus 1 mg BID, rifaximin 550 mg BID.    Continue lactulose as ordered, titrate as needed to have 2-3 BMs    GI consult as needed in hospital for new acute issues, otherwise as outpatient.    Encourgae high protein diet.     5. Goals of care   As per prior rounding provider, \"on 1/25 nursing had mentioned that patient had refused to undergo dialysis and want to stop care, palliative care was consulted.  Patient and his spouse denied wanting to stop care and wanted ongoing restorative care including full code\"    Full Code status.    Needs placement to TCU -> SW working on placement  Vs home if not able to find a TCU,  Per wife     6. Diarrhea, improved, on lactulose for chronic liver disease    C difficile negative on 1/31. Secondary to lactulose. Discontinued rectal tube (2/12) as stools more formed.    Continue lactulose with goal of 2 to 3 soft bowel movement in 24 hours.     7. Paroxysmal atrial fibrillation  Chronic anticoagulation, apixaban  History of embolic strokes  Remains in sinus rhythm, on anticoagulation with apixaban indefinitely for stroke prophylaxis.      Continue apixaban anticoagulation, it was briefly held for fistula placement.    Monitor hemoglobin, see below.     8. Acute anemia, normocytic  Patient has required intermittent transfusions for on-going anemia.  Anemia most likely secondary to critical illness/chronic disease, chronic renal disease.  Baseline hemoglobin around 7-8.  Likely Epo resistance.    Hemoglobin fairly stable now between 8-9    Last hgb 8.9 (4/14/23)    Family would like a vit B12 level drawn  (can do as an add-on)    On daily folate supplements    Monitor intermittently.     9. Hypercalcemia  Work-up shows low/low normal vitamin D, suppressed PTH.  But PTH related peptide is " elevated.    Nephrology following and considered prolia, however patient's wife would like to hold off for now.    Holding VIT D and Phoslo.     Discussed with Dr Moulton regarding elevated PTHrP, suspected due to prolonged immobility. Plan is to interval follow up in a month or so. If trends up, then will plan forther work up.      10. History PEA arrest   PEA arrest prior to his previous admission and 1 episode of PEA associated with desaturation on 12/20.  No structural cardiac disease.      11. Chronic Hypotension  In the past has developed baseline hypotension with cirrhosis and prolonged critical illness.  On hemodialysis.  Receiving midodrine and albumin during dialysis.    Continue midodrine       12. History of seizure   After hypoxic event, in the context of baseline multifactorial encephalopathy secondary to his ongoing critical illness and prior cardiac arrests and prior strokes and cirrhosis with hepatic encephalopathy. MRI of head of 12/20/22 reveals no PRES or leptomeningeal enhancement but scattered.  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   No recent seizure activity.    Continue levetiracetam, lacosamide.    Seizure precautions.    Outpatient follow-up with neurology      13. ESRD  Anemia due to renal disease  Hypotension during dialysis  Hyperphosphatemia    Nephrology reviewing vein mapping to assess options for internal access placement for dialysis, see note 3/16.    Underwent Fistula placement on  3/21/22.    Vascular surgery following-continue to use for tunnel catheter.  Outpatient follow-up with vascular surgery and for will need a follow-up ultrasound of the fistula in 6 weeks to assess patency.    Started sevalmer 800mg TID with meals on 3/27/23.    Complained of cramping with dialysis. Tried a dose of oxycodone prior to dialysis but this was not helpful. Spouse prefers to avoid narcotic given encephalopathy.      Vitamin  B12 level  within normal  limits    14. Diabetes mellitus type 2  *Hemoglobin A1c on November 2022 was 4.7  *By report, on Lantus insulin in the past.  Blood sugar checks and sliding scale insulin previously discontinued as has been stable without insulin needs.    Monitor with periodic blood sugar checks as needed.     15. Hypomagnesemia    Resolved with replacement.     16. Severe malnutrition, protein and calorie type  Moderate dysphagia  G-tube feedings    Continue with tube feeds via PEG tube.    IR replaced the PEG tube on 2/8/2023, monitor.    Nutritionist consulted and following for tube feeds.    SLP following as well. Oral diet as per speech and language therapy (SLP). Patient hopeful to advance to thin liquids soon.    2/20 Nutrition following for tube feeds.    Now undergoing PRN tube feeds.    Encourage high-protein diet.    Nutrition notes reviewed, patient eating 0 to 100% of meals.  Continue current interventions.  As needed tube feed boluses available if patient consumes less than 50% of the meal.    Family encouraging patient to eat high protein diet including protein bars    Family had asked about g tube removal 4/21/23.  Per the EMR, last tube feed bolus was 3/22 if adequately documented. He refuses his tube feeds often per the dietitician. At this point, would favor keeping it in place given overall poor prognosis with ESRD, cirrhosis of transplanted liver, persistent encephalopathy, and inadequate oral intake. Event if he refuses, he is at risk for developing new infection or other decompensation and thus g tube as safety net.  -  This can continue to be re-addressed in f/up if he continues to do well and  There is really no need for it.     17. Anxiety  Fluoxetine was discontinued as per psychiatry recommendation on 2/2 (see consult note for details).    Psychiatry follow-up.     18. Restless leg  Syndrome    Patient started on ropiranole by neurology.     Medical Decision Making   42 min spent today in  direct pt care, chart review and family conferencing with family in the room.     Labs/Imaging Reviewed:  See Information above and Data section below      DVT Prophylaxis: DOAC  Nixon Catheter: Not present  Code Status: Full Code      Disposition Plan   Home with family  Vs. TCU     Entered: Madelyn Singletary,  04/23/2023, 7:08 AM       Interval History     Seen alone this am.    Sleeping on his left side this am.  Able to wake up briefly but then falls asleep almost immediately again after asking for oxycodone.    No new concerns overnight per nursing staff.    -Data reviewed today: I reviewed all new labs and imaging results over the last 24 hours. I personally reviewed no images or EKG's today.    Physical Exam   Temp: 97.8  F (36.6  C) Temp src: Oral BP: 104/58 Pulse: 70   Resp: 16 SpO2: 95 % O2 Device: None (Room air)    Vitals:    04/18/23 0343 04/19/23 0634 04/20/23 0645   Weight: 80.4 kg (177 lb 3.2 oz) 80.8 kg (178 lb 3.2 oz) 80.3 kg (177 lb 1.6 oz)     Vital Signs with Ranges  Temp:  [97.4  F (36.3  C)-97.8  F (36.6  C)] 97.8  F (36.6  C)  Pulse:  [70-76] 70  Resp:  [16-18] 16  BP: (104)/(58-59) 104/58  SpO2:  [95 %-98 %] 95 %  I/O last 3 completed shifts:  In: 240 [P.O.:240]  Out: -     GEN:  Quite sleepy this am  - unable to stay awake except for a few minutes when I briefly am able to  Shake awake  Appears  Comfortable lying on his left side  HEENT:  Normocephalic/atraumatic, no nasal discharge, mouth and membranes appear moist  CV:  Somewhat distant but regular rate and rhythm, no clear  loud murmur to ausc.  S1 + S2 noted, no S3 or S4.  LUNGS:  Clear to auscultation ant/post bilaterally;  Fairly good air exchange throughout. No costal retractions bilaterally.  Symmetric chest rise on inhalation noted.  ABD:  Active bowel sounds, soft, non-tender, mod distended.  No clear rebound/guarding/rigidity.  No obvious masses palpated.  .  EXT:  Trace pretibial edema still noted bilaterally, no  cyanosis bilaterally.   SKIN:  Dry to touch, no rashes or jaundice noted.  PSYCH: calm,  sleepy    Data   Labs:  Recent Labs   Lab 04/22/23  0821 04/20/23  1029 04/19/23  1433    138 138   POTASSIUM 4.5 4.4 4.0   CHLORIDE 99 100 99   CO2 27 26 28   ANIONGAP 10 12 11   * 121* 124*   BUN 75.5* 69.0* 46.4*   CR 6.16* 5.34* 4.11*   GFRESTIMATED 10* 12* 16*   KELLY 10.5* 10.2* 9.4     No results for input(s): WBC, HGB, HCT, MCV, PLT in the last 168 hours.  Recent Labs   Lab 04/22/23  0821 04/20/23  1029 04/19/23  1433 04/18/23  0928 04/17/23  0750 04/16/23  2123 04/16/23  1004    138 138 136 135*  --  134*   POTASSIUM 4.5 4.4 4.0 4.2 5.1  --  4.3   CHLORIDE 99 100 99 97* 96*  --  94*   CO2 27 26 28 25 24  --  25   ANIONGAP 10 12 11 14 15  --  15   * 121* 124* 124* 109*   < > 125*   BUN 75.5* 69.0* 46.4* 69.9* 93.4*  --  78.6*   CR 6.16* 5.34* 4.11* 5.62* 6.70*  --  5.55*   GFRESTIMATED 10* 12* 16* 11* 9*  --  11*   KELLY 10.5* 10.2* 9.4 10.3* 10.4*  --  10.5*   MAG  --   --   --  2.0 2.1  --  2.0   PHOS 5.3* 5.1* 3.9  3.9 5.5* 6.6*  --  5.9*   ALBUMIN 3.7 3.7 3.7 3.7 3.5  --  3.7    < > = values in this interval not displayed.     No results for input(s): CKT in the last 168 hours.    Invalid input(s): CK, CK TOTAL  No results for input(s): COLOR, APPEARANCE, URINEGLC, URINEBILI, URINEKETONE, SG, UBLD, URINEPH, PROTEIN, UROBILINOGEN, NITRITE, LEUKEST, RBCU, WBCU in the last 168 hours.   Recent Imaging:   No results found for this or any previous visit (from the past 24 hour(s)).    Medications     - MEDICATION INSTRUCTIONS -       - MEDICATION INSTRUCTIONS -         - MEDICATION INSTRUCTIONS for Dialysis Patients -   Does not apply See Admin Instructions     apixaban ANTICOAGULANT  5 mg Oral BID     folic acid  1 mg Oral or Feeding Tube Daily     Lacosamide  100 mg Oral Q12H     lactulose  10 g Oral BID     levETIRAcetam  1,000 mg Oral Q24H     lidocaine   Transdermal Q8H BIJU     melatonin  3 mg  Oral At Bedtime     menthol   Transdermal Q8H     midodrine  10 mg Oral or Feeding Tube TID w/meals     multivitamin RENAL  1 capsule Oral Daily     OLANZapine zydis  2.5 mg Oral Once per day on Mon Wed Fri     pantoprazole  40 mg Oral QAM AC     ramelteon  8 mg Oral QPM     rifaximin  550 mg Oral or Feeding Tube BID     rOPINIRole  0.5 mg Oral At Bedtime     sevelamer carbonate  800 mg Oral TID w/meals     tacrolimus  1 mg Oral Q12H     traZODone  50 mg Oral At Bedtime

## 2023-04-23 NOTE — PLAN OF CARE
Goal Outcome Evaluation:    DATE & TIME: 04/23 AM shift    Cognitive Concerns/ Orientation: Alert/Oriented x 3, disoriented to situation   BEHAVIOR & AGGRESSION TOOL COLOR: Green     ABNL VS/O2: Twice daily vitals-on scheduled midodrine, room air  MOBILITY: SBA gait belt/walker  PAIN MANAGMENT: PRN tylenol given for back pain with improvement  DIET: Regular with mildly thickened liquid  BOWEL/BLADDER: Continent of bladder/bowel, bathroom frequency  ABNL LAB/BG: UN 85.8, Cr 7.10, Ca 10.4, Phos 6.6, Folate >40.0, Hemoglobin 8.4  DRAIN/DEVICES: Right chest tunneled CVC for dialysis; AV fistula to left arm-thrill/bruit present; PEG tube clamped  SKIN: Mild blanchable redness to coccyx. Scattered bruising, Peeling to feet. Scattered bruising  TESTS/PROCEDURES: Dialysis schedule MWF  D/C DATE: Ambulated in halls with PT, used urea cream for body itching  Discharge to TCU pending placement

## 2023-04-24 LAB
ALBUMIN SERPL BCG-MCNC: 3.6 G/DL (ref 3.5–5.2)
ANION GAP SERPL CALCULATED.3IONS-SCNC: 15 MMOL/L (ref 7–15)
BUN SERPL-MCNC: 93.8 MG/DL (ref 6–20)
CALCIUM SERPL-MCNC: 10.4 MG/DL (ref 8.6–10)
CHLORIDE SERPL-SCNC: 98 MMOL/L (ref 98–107)
CREAT SERPL-MCNC: 7.83 MG/DL (ref 0.67–1.17)
DEPRECATED HCO3 PLAS-SCNC: 25 MMOL/L (ref 22–29)
GFR SERPL CREATININE-BSD FRML MDRD: 7 ML/MIN/1.73M2
GLUCOSE SERPL-MCNC: 97 MG/DL (ref 70–99)
HBV SURFACE AB SERPL IA-ACNC: 0 M[IU]/ML
HBV SURFACE AB SERPL IA-ACNC: NONREACTIVE M[IU]/ML
HBV SURFACE AG SERPL QL IA: NONREACTIVE
MAGNESIUM SERPL-MCNC: 2.4 MG/DL (ref 1.7–2.3)
PHOSPHATE SERPL-MCNC: 8 MG/DL (ref 2.5–4.5)
POTASSIUM SERPL-SCNC: 4.9 MMOL/L (ref 3.4–5.3)
SODIUM SERPL-SCNC: 138 MMOL/L (ref 136–145)

## 2023-04-24 PROCEDURE — 83735 ASSAY OF MAGNESIUM: CPT | Performed by: STUDENT IN AN ORGANIZED HEALTH CARE EDUCATION/TRAINING PROGRAM

## 2023-04-24 PROCEDURE — 258N000003 HC RX IP 258 OP 636: Performed by: INTERNAL MEDICINE

## 2023-04-24 PROCEDURE — 80069 RENAL FUNCTION PANEL: CPT | Performed by: STUDENT IN AN ORGANIZED HEALTH CARE EDUCATION/TRAINING PROGRAM

## 2023-04-24 PROCEDURE — 87340 HEPATITIS B SURFACE AG IA: CPT | Performed by: INTERNAL MEDICINE

## 2023-04-24 PROCEDURE — 250N000013 HC RX MED GY IP 250 OP 250 PS 637: Performed by: STUDENT IN AN ORGANIZED HEALTH CARE EDUCATION/TRAINING PROGRAM

## 2023-04-24 PROCEDURE — 99232 SBSQ HOSP IP/OBS MODERATE 35: CPT | Performed by: HOSPITALIST

## 2023-04-24 PROCEDURE — 250N000013 HC RX MED GY IP 250 OP 250 PS 637: Performed by: PSYCHIATRY & NEUROLOGY

## 2023-04-24 PROCEDURE — 36415 COLL VENOUS BLD VENIPUNCTURE: CPT | Performed by: INTERNAL MEDICINE

## 2023-04-24 PROCEDURE — 90935 HEMODIALYSIS ONE EVALUATION: CPT | Performed by: INTERNAL MEDICINE

## 2023-04-24 PROCEDURE — 634N000001 HC RX 634: Performed by: INTERNAL MEDICINE

## 2023-04-24 PROCEDURE — 86706 HEP B SURFACE ANTIBODY: CPT | Performed by: INTERNAL MEDICINE

## 2023-04-24 PROCEDURE — 120N000001 HC R&B MED SURG/OB

## 2023-04-24 PROCEDURE — 250N000013 HC RX MED GY IP 250 OP 250 PS 637: Performed by: HOSPITALIST

## 2023-04-24 PROCEDURE — 250N000012 HC RX MED GY IP 250 OP 636 PS 637: Performed by: STUDENT IN AN ORGANIZED HEALTH CARE EDUCATION/TRAINING PROGRAM

## 2023-04-24 PROCEDURE — 90937 HEMODIALYSIS REPEATED EVAL: CPT

## 2023-04-24 PROCEDURE — 250N000011 HC RX IP 250 OP 636: Performed by: INTERNAL MEDICINE

## 2023-04-24 RX ORDER — ALBUMIN (HUMAN) 12.5 G/50ML
50 SOLUTION INTRAVENOUS
Status: DISCONTINUED | OUTPATIENT
Start: 2023-04-24 | End: 2023-04-26

## 2023-04-24 RX ADMIN — APIXABAN 5 MG: 5 TABLET, FILM COATED ORAL at 13:02

## 2023-04-24 RX ADMIN — EPOETIN ALFA-EPBX 4000 UNITS: 4000 INJECTION, SOLUTION INTRAVENOUS; SUBCUTANEOUS at 09:32

## 2023-04-24 RX ADMIN — SEVELAMER CARBONATE 800 MG: 800 TABLET, FILM COATED ORAL at 18:05

## 2023-04-24 RX ADMIN — LACTULOSE 10 G: 10 SOLUTION ORAL at 20:13

## 2023-04-24 RX ADMIN — MIDODRINE HYDROCHLORIDE 10 MG: 5 TABLET ORAL at 18:05

## 2023-04-24 RX ADMIN — MIDODRINE HYDROCHLORIDE 10 MG: 5 TABLET ORAL at 12:47

## 2023-04-24 RX ADMIN — SODIUM CHLORIDE 300 ML: 9 INJECTION, SOLUTION INTRAVENOUS at 08:58

## 2023-04-24 RX ADMIN — FOLIC ACID 1 MG: 1 TABLET ORAL at 12:47

## 2023-04-24 RX ADMIN — ACETAMINOPHEN 650 MG: 325 TABLET, FILM COATED ORAL at 20:34

## 2023-04-24 RX ADMIN — LEVETIRACETAM 1000 MG: 500 TABLET, FILM COATED ORAL at 20:17

## 2023-04-24 RX ADMIN — RIFAXIMIN 550 MG: 550 TABLET ORAL at 20:06

## 2023-04-24 RX ADMIN — SEVELAMER CARBONATE 800 MG: 800 TABLET, FILM COATED ORAL at 12:47

## 2023-04-24 RX ADMIN — LACTULOSE 10 G: 10 SOLUTION ORAL at 12:45

## 2023-04-24 RX ADMIN — PANTOPRAZOLE SODIUM 40 MG: 40 TABLET, DELAYED RELEASE ORAL at 12:47

## 2023-04-24 RX ADMIN — MELATONIN TAB 3 MG 3 MG: 3 TAB at 20:17

## 2023-04-24 RX ADMIN — RIFAXIMIN 550 MG: 550 TABLET ORAL at 13:09

## 2023-04-24 RX ADMIN — APIXABAN 5 MG: 5 TABLET, FILM COATED ORAL at 20:06

## 2023-04-24 RX ADMIN — IRON SUCROSE 100 MG: 20 INJECTION, SOLUTION INTRAVENOUS at 09:43

## 2023-04-24 RX ADMIN — LACOSAMIDE 100 MG: 100 TABLET, FILM COATED ORAL at 20:17

## 2023-04-24 RX ADMIN — HEPARIN SODIUM 1900 UNITS: 1000 INJECTION INTRAVENOUS; SUBCUTANEOUS at 09:24

## 2023-04-24 RX ADMIN — TACROLIMUS 1 MG: 1 CAPSULE ORAL at 20:06

## 2023-04-24 RX ADMIN — LACOSAMIDE 100 MG: 100 TABLET, FILM COATED ORAL at 12:48

## 2023-04-24 RX ADMIN — ACETAMINOPHEN 650 MG: 325 TABLET, FILM COATED ORAL at 06:57

## 2023-04-24 RX ADMIN — ROPINIROLE HYDROCHLORIDE 0.5 MG: 0.25 TABLET, FILM COATED ORAL at 20:17

## 2023-04-24 RX ADMIN — Medication 2.5 MG: at 13:01

## 2023-04-24 RX ADMIN — RAMELTEON 8 MG: 8 TABLET, FILM COATED ORAL at 20:06

## 2023-04-24 RX ADMIN — HEPARIN SODIUM 1900 UNITS: 1000 INJECTION INTRAVENOUS; SUBCUTANEOUS at 09:23

## 2023-04-24 RX ADMIN — Medication 1 CAPSULE: at 12:46

## 2023-04-24 RX ADMIN — MIDODRINE HYDROCHLORIDE 10 MG: 5 TABLET ORAL at 06:58

## 2023-04-24 RX ADMIN — TACROLIMUS 1 MG: 1 CAPSULE ORAL at 13:01

## 2023-04-24 RX ADMIN — SODIUM CHLORIDE 250 ML: 9 INJECTION, SOLUTION INTRAVENOUS at 08:58

## 2023-04-24 ASSESSMENT — ACTIVITIES OF DAILY LIVING (ADL)
ADLS_ACUITY_SCORE: 51
ADLS_ACUITY_SCORE: 49
ADLS_ACUITY_SCORE: 46
ADLS_ACUITY_SCORE: 49
ADLS_ACUITY_SCORE: 49
ADLS_ACUITY_SCORE: 56
ADLS_ACUITY_SCORE: 49
ADLS_ACUITY_SCORE: 46

## 2023-04-24 NOTE — PLAN OF CARE
Goal Outcome Evaluation:  DATE & TIME: 4/24/2023 [id number]   Cognitive Concerns/ Orientation : A&O X3   Forgetful  BEHAVIOR & AGGRESSION TOOL COLOR: Green  CIWA SCORE: N/A   ABNL VS/O2: Vss (BP runs soft), on RA  MOBILITY: SBA 1/gait belt/walker. Up  in chair for meals, ambulates to bathroom, 2 BM's today  PAIN MANAGMENT: Denies pain   DIET: Regular diet/ thickened liquid, can also have thin liquids 30 minutes before & 30 minutes after meals  BOWEL/BLADDER: Continent of bowel (on lactulose) pt on dialysis  ABNL LAB/BG: Labs taken before dialysis Creat 7.83 BUN 93.8 Phos 8.0  DRAIN/DEVICES: dialysis port  TELEMETRY RHYTHM: NA  SKIN: Scattered bruisis   TESTS/PROCEDURES: Had dialysis this am  D/C DATE:Pending  Discharge Barriers:   OTHER IMPORTANT INFO:

## 2023-04-24 NOTE — PLAN OF CARE
Goal Outcome Evaluation:                      DATE & TIME: 04/23/23, Pm shift    Cognitive Concerns/ Orientation: Alert/Oriented x 3, disoriented to situation.   BEHAVIOR & AGGRESSION TOOL COLOR: Green. Impulsive with getting out of bed at times.  ABNL VS/O2: VSS, On RA  MOBILITY: SBA gait belt/walker  PAIN MANAGMENT: PRN tylenol given for lower back pain and abdominal cramping pain. Aslo gave PRN Simethicone for abdominal cramp. Heat pack applied on abdomen.  DIET: Regular with mildly thickened liquid. Fair appetite.  BOWEL/BLADDER: Continent of bladder/bowel. Had bm. Gave schedule Lactulose.  ABNL LAB/BG: UN 85.8, Cr 7.10, Ca 10.4, Phos 6.6, Folate >40.0, Hemoglobin 8.4  DRAIN/DEVICES: Right chest tunneled CVC for dialysis; AV fistula to left arm-thrill/bruit present; PEG tube clamped, dressing changed.  SKIN: blanchable redness to coccyx. Scattered bruising.  TESTS/PROCEDURES: Dialysis schedule MWF. Dialysis tomorrow.  D/C DATE:   Discharge to TCU pending placement.  OTHER IMPORTANT INFO: PT/SW/Nephrology following. Bedtime Trazodone held per family request to hold and give Melatonin, according to family, patient more tired and sleepy today.

## 2023-04-24 NOTE — PLAN OF CARE
Goal Outcome Evaluation:       DATE & TIME: 04/24/23, [id number]  Cognitive Concerns/ Orientation: Alert/Oriented x 3, disoriented to situation.   BEHAVIOR & AGGRESSION TOOL COLOR: Green. Impulsive with getting out of bed at times.  ABNL VS/O2: VSS, On RA  MOBILITY: SBA gait belt/walker  PAIN MANAGMENT: Tylenol   DIET: Regular with mildly thickened liquid. Fair appetite.  BOWEL/BLADDER: Continent of bladder/bowel. 1 BM  ABNL LAB/BG: UN 85.8, Cr 7.10, Ca 10.4, Phos 6.6, Folate >40.0, Hemoglobin 8.4  DRAIN/DEVICES: Right chest tunneled CVC for dialysis; AV fistula to left arm-thrill/bruit present; PEG tube clamped, dressing changed.  SKIN: blanchable redness to coccyx. Scattered bruising.  TESTS/PROCEDURES: Dialysis schedule MWF. Dialysis this morning

## 2023-04-24 NOTE — PROGRESS NOTES
Care Management Follow Up    Length of Stay (days): 93     Expected Discharge Date: Pending confirmation of outpatient dialysis chair time and TCU acceptance     Concerns to be Addressed: adjustment to diagnosis/illness, care coordination/care conferences, discharge planning     Patient plan of care discussed at interdisciplinary rounds: Yes     Anticipated Discharge Disposition:TCU     Anticipated Discharge Services: rehab and dialysis   Anticipated Discharge DME:  NA     Patient/family educated on Medicare website which has current facility and service quality ratings:  NA  Education Provided on the Discharge Plan:  yes  Patient/Family in Agreement with the Plan: yes     Referrals Placed by CM/SW:  Dialysis/TCUs  Private pay costs discussed: Not applicable  Additional Information:  Conversed with Reji, Admissions from [place].  Currently unable to accept due to high cost of medications.  Have left [facility] Admissions a message concerning patient and improvements he has made, awaiting call back.  Also awaiting call back for [facility]ia[facility].  Also, there is still no confirmed outpatient dialysis unit.  [facility] is still working on this and requested current dialysis run sheets, that have been faxed.       Rosalba Yoo, RN   Inpatient Care Management  [phone]

## 2023-04-24 NOTE — PROGRESS NOTES
"[facility]    Nephrology Progress Note    Assessment & Plan      <ESRD    ~Seen on maintenance HD today. He is complaining of pain on dialysis, but I see from the notes this does not seem to be consisent and has pain elsewhere as well.     - Continue HD MWF.     -Monitor symptoms on treaments. Does not seem to be an issue of the pain being excess volume removal as remains stable and is not have cramping elsewhere currently.     <ANEMIA OF RENAL DISEASE    ~On epoeitin mirta maximum dose on dialysis.     <CKD BMD    ~Phos up to 8 today. 5-6 yesterday. Written for sevelamer.    Rainer Fitzpatrick MD  Nephrology  [facility]  Cell:[phone]  On Vocera   Pager:[phone]            .........    Interval History     Seen on dialysis. Doing with with nothing major untoward. Some hypotension which is not very unusual for Blanco.  Tells me he is having some pain on the treatment, but the abdomen is benign to examination. Also has been noted to have some cramping.       Temp: 98.1  F (36.7  C) Temp src: Oral BP: (!) 88/51 Pulse: 72   Resp: (!) 38 SpO2: 93 % O2 Device: None (Room air)      Vitals:    04/19/23 0634 04/20/23 0645 04/24/23 0558   Weight: 80.8 kg (178 lb 3.2 oz) 80.3 kg (177 lb 1.6 oz) 83.2 kg (183 lb 6.8 oz)       Vital Signs with Ranges    Temp:  [97.3  F (36.3  C)-98.1  F (36.7  C)] 98.1  F (36.7  C)  Pulse:  [66-83] 72  Resp:  [12-38] 38  BP: ()/(48-75) 88/51  SpO2:  [90 %-100 %] 93 %    I/O last 3 completed shifts:  In: 340 [P.O.:340]  Out: -     Physical Exam  Vitals and nursing note reviewed.   Constitutional:       Comments: Fatigued appearing. Speechis confused at time. \"When I'm going around [employer] I'm fine. It's just when I get here.\"   HENT:      Head: Normocephalic.      Mouth/Throat:      Mouth: Mucous membranes are dry.   Eyes:      Pupils: Pupils are equal, round, and reactive to light.   Cardiovascular:      Rate and Rhythm: Normal rate.   Pulmonary: "      Effort: Pulmonary effort is normal. No respiratory distress.   Abdominal:      General: Abdomen is flat. There is no distension.      Palpations: Abdomen is soft.   Musculoskeletal:         General: No swelling.         BP Readings from Last 5 Encounters:   04/24/23 (!) 88/51   01/21/23 120/71   12/29/22 (!) 139/94   12/16/22 101/63   12/16/22 117/75        Wt Readings from Last 10 Encounters:   04/24/23 83.2 kg (183 lb 6.8 oz)   01/21/23 82.4 kg (181 lb 11.2 oz)   12/28/22 96 kg (211 lb 10.3 oz)   12/16/22 100.2 kg (221 lb)   12/16/22 100.2 kg (221 lb)   12/15/22 87 kg (191 lb 12.8 oz)   10/07/19 137.5 kg (303 lb 3.2 oz)   10/04/19 131.5 kg (290 lb)   02/20/19 140.4 kg (309 lb 9.6 oz)   07/10/18 137.5 kg (303 lb 3.2 oz)           Medications       - MEDICATION INSTRUCTIONS -       - MEDICATION INSTRUCTIONS -           - MEDICATION INSTRUCTIONS for Dialysis Patients -   Does not apply See Admin Instructions     apixaban ANTICOAGULANT  5 mg Oral BID     folic acid  1 mg Oral or Feeding Tube Daily     Lacosamide  100 mg Oral Q12H     lactulose  10 g Oral BID     levETIRAcetam  1,000 mg Oral Q24H     lidocaine   Transdermal Q8H BIJU     melatonin  3 mg Oral At Bedtime     menthol   Transdermal Q8H     midodrine  10 mg Oral or Feeding Tube TID w/meals     multivitamin RENAL  1 capsule Oral Daily     OLANZapine zydis  2.5 mg Oral Once per day on Mon Wed Fri     pantoprazole  40 mg Oral QAM AC     ramelteon  8 mg Oral QPM     rifaximin  550 mg Oral or Feeding Tube BID     rOPINIRole  0.5 mg Oral At Bedtime     sevelamer carbonate  800 mg Oral TID w/meals     sodium chloride (PF)  9 mL Intracatheter During Dialysis/CRRT (from stock)     sodium chloride (PF)  9 mL Intracatheter During Dialysis/CRRT (from stock)     tacrolimus  1 mg Oral Q12H     traZODone  50 mg Oral At Bedtime       Data     UA RESULTS:  Recent Labs   Lab Test 11/25/22  1859 10/31/17  1038   COLOR Yellow [place]   APPEARANCE Slightly Cloudy* Slightly  Cloudy   URINEGLC Negative Negative   URINEBILI Negative Negative   URINEKETONE Negative 5*   SG 1.017 1.021   UBLD Moderate* Negative   URINEPH 5.5 5.0   PROTEIN 70* Negative   NITRITE Negative Negative   LEUKEST Small* Negative   RBCU  --  <1   WBCU  --  1      BMP  Recent Labs   Lab 04/24/23  0815 04/23/23  0812 04/22/23  0821 04/20/23  1029    137 136 138   POTASSIUM 4.9 4.5 4.5 4.4   CHLORIDE 98 98 99 100   KELLY 10.4* 10.4* 10.5* 10.2*   CO2 25 25 27 26   BUN 93.8* 85.8* 75.5* 69.0*   CR 7.83* 7.10* 6.16* 5.34*   GLC 97 108* 134* 121*     Phos@LABRCNTIPR(phos:4)  CBC)  Recent Labs   Lab 04/23/23  0812   HGB 8.4*     Lab Results   Component Value Date    ALBUMIN 3.6 04/24/2023    ALBUMIN 3.5 04/23/2023    ALBUMIN 3.7 04/22/2023    ALBUMIN 1.8 12/29/2022    ALBUMIN 1.7 12/28/2022    ALBUMIN 2.1 12/27/2022    ALBUMIN 3.9 04/20/2020    ALBUMIN 3.8 10/07/2019    ALBUMIN 3.8 02/20/2019        Recent Labs   Lab 04/23/23  0812 04/22/23  0821   HGB 8.4*  --    B12  --  989   FOLIC >40.0*  --        No lab results found in last 7 days.    Invalid input(s): BILIRUBININDIRECT  No lab results found in last 7 days.  25 OH Vit D2   Date Value Ref Range Status   09/24/2016 <5 ug/L Final     25 OH Vitamin D2   Date Value Ref Range Status   02/16/2023 <5 ug/L Final     25 OH Vit D3   Date Value Ref Range Status   09/24/2016 8 ug/L Final     25 OH Vitamin D3   Date Value Ref Range Status   02/16/2023 37 ug/L Final     25 OH Vit D total   Date Value Ref Range Status   09/24/2016 (L) 20 - 75 ug/L Final    <13  Season, race, dietary intake, and treatment affect the concentration of   25-hydroxy-Vitamin D. Values may decrease during winter months and increase   during summer months. Values 20-29 ug/L may indicate Vitamin D insufficiency   and values <20 ug/L may indicate Vitamin D deficiency.   This test was developed and its performance characteristics determined by the   [facility],  [facility]. It has   not been cleared or approved by the FDA. The laboratory is regulated under CLIA   as qualified to perform high-complexity testing. This test is used for clinical   purposes. It should not be regarded as investigational or for research.       25 OH Vit D Total   Date Value Ref Range Status   02/16/2023 <42 20 - 75 ug/L Final     Comment:     Season, race, dietary intake, and treatment affect the concentration of 25-hydroxy-Vitamin D. Values may decrease during winter months and increase during summer months. Values 20-29 ug/L may indicate Vitamin D insufficiency and values <20 ug/L may indicate Vitamin D deficiency.     No results for input(s): PTHI in the last 168 hours.    Attestation:   I have reviewed today's relevant vital signs, notes, medications, labs and imaging.

## 2023-04-24 NOTE — PROGRESS NOTES
Potassium   Date Value Ref Range Status   04/24/2023 4.9 3.4 - 5.3 mmol/L Final   12/29/2022 3.4 3.4 - 5.3 mmol/L Final   04/20/2020 3.9 3.4 - 5.3 mmol/L Final     Potassium POCT   Date Value Ref Range Status   01/19/2023 3.6 3.5 - 5.0 mmol/L Final     Hemoglobin   Date Value Ref Range Status   04/23/2023 8.4 (L) 13.3 - 17.7 g/dL Final   04/20/2020 14.3 13.3 - 17.7 g/dL Final     Creatinine   Date Value Ref Range Status   04/24/2023 7.83 (H) 0.67 - 1.17 mg/dL Final   04/20/2020 1.06 0.66 - 1.25 mg/dL Final     Urea Nitrogen   Date Value Ref Range Status   04/24/2023 93.8 (H) 6.0 - 20.0 mg/dL Final   12/29/2022 46 (H) 7 - 30 mg/dL Final   04/20/2020 23 7 - 30 mg/dL Final     Sodium   Date Value Ref Range Status   04/24/2023 138 136 - 145 mmol/L Final   04/20/2020 139 133 - 144 mmol/L Final     INR   Date Value Ref Range Status   12/21/2022 1.36 (H) 0.85 - 1.15 Final   10/07/2019 1.20 (H) 0.86 - 1.14 Final       DIALYSIS PROCEDURE NOTE  Hepatitis status of previous patient on machine log was checked and verified ok to use with this patients hepatitis status.  Patient dialyzed for 3 hrs. on a K3 bath with a net fluid removal of  1.5L.  A BFR of 400 ml/min was obtained via a CVC.      The treatment plan was discussed with Dr. Reddy during the treatment.    Total heparin received during the treatment: 0 units.   Line flushed, clamped and capped with heparin 1:1000 1.9 mL (1900 units) per lumen    Meds  given: Epo, Venofer   Complications:none      Person educated: patient. Knowledge base adequate. Barriers to learning: forgetful. Educated on procedure via verbal mode.Pt verbalized understanding. Pt prefers verbal education style.     ICEBOAT? Timeout performed pre-treatment  I: Patient was identified using 2 identifiers  C:  Consent Signed Yes  E: Equipment preventative maintenance is current and dialysis delivery system OK to use  B: Hepatitis B Surface Antigen: Neg; Draw Date: 3/29/23      Hepatitis B Surface Antibody:  Susceptible; Draw Date: 3/29/23  O: Dialysis orders present and complete prior to treatment  A: Vascular access verified and assessed prior to treatment  T: Treatment was performed at a clinically appropriate time  ?: Patient was allowed to ask questions and address concerns prior to treatment  See Adult Hemodialysis flowsheet in [facility] for further details and post assessment.  Machine water alarm in place and functioning. Transducer pods intact and checked every 15min.   Pt returned via bed.  Chlorine/Chloramine water system checked every 4 hours.  Outpatient Dialysis at [facility]    Patient repositioned every 2 hours during the treatment.  Post treatment report given to PETER Porter regarding 1.5L of fluid removed, last BP of 92/64, and patient pain rating of 0/10.

## 2023-04-24 NOTE — PROGRESS NOTES
[facility]    Medicine Progress Note - Hospitalist Service    Date of Admission:  1/21/2023    Assessment & Plan   Blanco Osborne is a 57 year-old male admitted on 1/21/2023 as a transfer from [facility] for evaluation of loculated pleural effusion. He has extensive PMH including h/o liver transplant 2016 due to alcohol abuse, pAF on chronic anticoagulation, history of diabetes mellitus type 2, CVA, hypertension, CHRISTIAN on BiPAP.  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 He was recovering in [facility] and was transferred to [facility] for consideration of chest tube placement and possible intrapleural lysis for worsening effusion    1. Acute metabolic encephalopathy with delirium, multifactorial, -resolving and now more chronic, hepatic encephalopathy  Chronic liver disease, history of liver transplant 2016  ESRD on HD  History of seizure, on Keppra and Vimpat     Continuing to have waxing and waning mentation,  Earlier in hospital stay, remained confused and had pulled out tracheostomy tube, PICC line and pulled his dialysis catheter- replaced.    Mental status continues to fluctuate with periods of alertness and increased sleepiness but without agitation for several days and overall improving.  Off restraints and sitter for several days.    Complicated, prolonged hospital course with mental status concerns multifactorial related to hepatic encephalopathy, end-stage renal disease on dialysis, past PEA arrest, seizure disorder, medication and chronic liver disease with prior liver transplant.     Continue to reorient and redirect as able. Maintain normal day/night cycles and sleep-wake cycles.  Minimize sedating medications as able.  Remains weak and deconditioned with complex,  prolonged hospital course and limited mobility.  Encouragement and support offered daily to patient.    Continue Lactulose to 10 mg BID, monitor for symptoms; goal to have 2-3 bowel movements per day.    Ongoing hemodialysis, as per nephrology, 3X/week dialysis schedule. Nephrology following    Continue ramelteon 8 mg at bedtime; monitor for side effects including AM sedation, reassess daily.    Started on olanzapine 5 mg BID and PRN earlier during this hospitalization. Delirium improved/resolved. Now scheduled HS dose discontinued. Plan to stop the doses with HD as well in upcoming days. Psychiatry recommended  trazodone 25 mg at bedtime, increased to 50 mg. Melatonin as well added.     Continue Keppra and Vimpat. No recurrent seizures reported of recent.     Neurology consult appreciated.  Patient has been started on ropinirole for restless leg syndrome.     Family stating that he is doing well with weaning down on zyprexa  -----currently receiving doses 3x's/week     2. Bilateral pleural effusions   Acute respiratory failure requiring tracheostomy  - resolved    Pneumonia  - resolved   ARDS  - resolved    Right pneumothorax - resolved   *Initial event was parainfluenza and strep pneumo pneumonia back in November 2022. Treated for ARDS. Had failed extubation x2 and tracheostomy performed on previous hospitalization. *Had additional complications of bilateral pneumothoraces as well as hydropneumothorax- pleural fluid grew candida parapsilosis  *Completed 4-week antifungal treatment. While in LTACH he was successfully weaned from the ventilator.  *Recently there were concern for worsening effusion while at [facility] and had thoracentesis 01/13 which returned exudative without growth on cultures. CT chest/abdomen/pelvis on 01/18 demonstrated worsening effusions and concerns for infected parapneumonic effusions with possible SBP.  Multiple pulmonologist at [facility] reviewed CT scan recommended transfer to [facility] for  consideration of chest tube placement and possible intrapleural lysis  *Thoracic surgery (Dr. Jacskon) consulted during admission. CT chest small effusions on imaging and so chest tube was not inserted.   * ID and pulmonary as well consulted this stay and have since signed off. No abx.   *Patient pulled out his tracheostomy tube on the night 2/1-2/2 and is tolerating well without increased shortness of breath persistent cough or worsening hypoxia.     Stable on room air now    Encourage incentive spirometry as able, up to chair, deep breathing.    Tracheostomy site has healed now     3.  Possible splenic infract  Wedge shaped area on CT scan chest and CT abdomen on 3/7/23.    Hematology consulted, suspicion for infarct was low.    TTE no thrombus or vegetations.     4. S/p liver transplant 2016   Chronic immunosuppression, on tacrolimus  Cirrhosis with ascites  Subacute bacterial peritonitis (SBP), resolved  *Main manifestations of this are hepatic encephalopathy and ascites.  Hepatologist at [place] felt that most likely reason for the cirrhosis was metabolic syndrome or alcohol intake.  There was no evidence of rejection on biopsy and there was some evidence of regeneration. Paracentesis done on 12/22/2022 showed lactobacillus indicating SBP, treated with Unasyn and Augmentin, finished 2-week course 1/12/2023.  Requires large-volume paracentesis periodically for recurring ascites.    *Paracentesis 1/13/2023 yielded about 7500 cc. Cultures NGTD. paracentesis on 1/24 with 4.8 L fluid removal  Paracentesis on 3/6/23 No SBP    CT abdomen 3/9 small to moderate ascites. Continue intermittent paracentesis as needed if develops symptomatic ascites.    Monitor for signs and symptoms of recurrent spontaneous bacterial peritonitis.    Further treatment for recurrent ascites with TIPS deferred to his Liver Transplant team and/or Hepatology, he had an appointment on 4/21/2023 with Hepatology, Dr. Simms but needs to  "reschedule given ongoing hosptial stay.    Continue Tacrolimus 1 mg BID, rifaximin 550 mg BID.    Continue lactulose as ordered, titrate as needed to have 2-3 BMs    GI consult as needed in hospital for new acute issues, otherwise as outpatient.    Encourgae high protein diet.     5. Goals of care   As per prior rounding provider, \"on 1/25 nursing had mentioned that patient had refused to undergo dialysis and want to stop care, palliative care was consulted.  Patient and his spouse denied wanting to stop care and wanted ongoing restorative care including full code\"    Full Code status.    Needs placement to TCU -> SW working on placement  Vs home if not able to find a TCU,  Per wife     6. Diarrhea, improved, on lactulose for chronic liver disease    C difficile negative on 1/31. Secondary to lactulose. Discontinued rectal tube (2/12) as stools more formed.    Continue lactulose with goal of 2 to 3 soft bowel movement in 24 hours.     7. Paroxysmal atrial fibrillation  Chronic anticoagulation, apixaban  History of embolic strokes  Remains in sinus rhythm, on anticoagulation with apixaban indefinitely for stroke prophylaxis.      Continue apixaban anticoagulation, it was briefly held for fistula placement.    Monitor hemoglobin, see below.     8. Acute anemia, normocytic  Patient has required intermittent transfusions for on-going anemia.  Anemia most likely secondary to critical illness/chronic disease, chronic renal disease.  Baseline hemoglobin around 7-8.  Likely Epo resistance.    Hemoglobin fairly stable now between 8-9    Last hgb 8.9 (4/14/23)    Family would like a vit B12 level drawn  (can do as an add-on)    On daily folate supplements    Monitor intermittently.     9. Hypercalcemia  Work-up shows low/low normal vitamin D, suppressed PTH.  But PTH related peptide is elevated.    Nephrology following and considered prolia, however patient's wife would like to hold off for now.    Holding VIT D and " Phoslo.     Discussed with Dr Moulton regarding elevated PTHrP, suspected due to prolonged immobility. Plan is to interval follow up in a month or so. If trends up, then will plan forther work up.      10. History PEA arrest   PEA arrest prior to his previous admission and 1 episode of PEA associated with desaturation on 12/20.  No structural cardiac disease.      11. Chronic Hypotension  In the past has developed baseline hypotension with cirrhosis and prolonged critical illness.  On hemodialysis.  Receiving midodrine and albumin during dialysis.    Continue midodrine       12. History of seizure   After hypoxic event, in the context of baseline multifactorial encephalopathy secondary to his ongoing critical illness and prior cardiac arrests and prior strokes and cirrhosis with hepatic encephalopathy. MRI of head of 12/20/22 reveals no PRES or leptomeningeal enhancement but scattered.  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   No recent seizure activity.    Continue levetiracetam, lacosamide.    Seizure precautions.    Outpatient follow-up with neurology      13. ESRD  Anemia due to renal disease  Hypotension during dialysis  Hyperphosphatemia    Nephrology reviewing vein mapping to assess options for internal access placement for dialysis, see note 3/16.    Underwent Fistula placement on  3/21/22.    Vascular surgery following-continue to use for tunnel catheter.  Outpatient follow-up with vascular surgery and for will need a follow-up ultrasound of the fistula in 6 weeks to assess patency.    Started sevalmer 800mg TID with meals on 3/27/23.    Complained of cramping with dialysis. Tried a dose of oxycodone prior to dialysis but this was not helpful. Spouse prefers to avoid narcotic given encephalopathy.      Vitamin  B12 level within normal  limits     14. Diabetes mellitus type 2  *Hemoglobin A1c on November 2022 was 4.7  *By report, on Lantus insulin in  the past.  Blood sugar checks and sliding scale insulin previously discontinued as has been stable without insulin needs.    Monitor with periodic blood sugar checks as needed.     15. Hypomagnesemia    Resolved with replacement.     16. Severe malnutrition, protein and calorie type  Moderate dysphagia  G-tube feedings    Continue with tube feeds via PEG tube.    IR replaced the PEG tube on 2/8/2023, monitor.    Nutritionist consulted and following for tube feeds.    SLP following as well. Oral diet as per speech and language therapy (SLP). Patient hopeful to advance to thin liquids soon.    2/20 Nutrition following for tube feeds.    Now undergoing PRN tube feeds.    Encourage high-protein diet.    Nutrition notes reviewed, patient eating 0 to 100% of meals.  Continue current interventions.  As needed tube feed boluses available if patient consumes less than 50% of the meal.    Family encouraging patient to eat high protein diet including protein bars    Family had asked about g tube removal 4/21/23.  Per the EMR, last tube feed bolus was 3/22 if adequately documented. He refuses his tube feeds often per the dietitician. At this point, would favor keeping it in place given overall poor prognosis with ESRD, cirrhosis of transplanted liver, persistent encephalopathy, and inadequate oral intake. Event if he refuses, he is at risk for developing new infection or other decompensation and thus g tube as safety net.  -  This can continue to be re-addressed in f/up if he continues to do well and  There is really no need for it.     17. Anxiety  Fluoxetine was discontinued as per psychiatry recommendation on 2/2 (see consult note for details).    Psychiatry follow-up.     18. Restless leg  Syndrome    Patient started on ropiranole by neurology.    4/24- stable, continue present care      Diet: Snacks/Supplements Adult: Other; Gelatein+ with brkfst and lunch, and magic cup with dinner (RD); With Meals  Combination Diet  Regular Diet; Mildly Thick (level 2) (OK for fresh fruit (e.g., pineapple) per SLP)  Adult Formula Bolus Feeding: Novasource Renal; Route: Gastrostomy; 3 times daily; Volume per Bolus: 240; mL(s); Back up bolus for when pt consumes <50% of meals: 80 mL/hr x 3 hrs  Room Service    DVT Prophylaxis: Pneumatic Compression Devices  Nixon Catheter: Not present  Lines: PRESENT      CVC Double Lumen Right External jugular Tunneled-Site Assessment: WDL  Hemodialysis Vascular Access Arteriovenous fistula Left Forearm-Site Assessment: WDL      Cardiac Monitoring: None  Code Status: Full Code      Clinically Significant Risk Factors           # Hypercalcemia: Highest Ca = 10.4 mg/dL in last 2 days, will monitor as appropriate    # Hypoalbuminemia: Lowest albumin = 1.9 g/dL at 1/23/2023  6:38 AM, will monitor as appropriate            # Severe Malnutrition: based on nutrition assessment        Disposition Plan      Expected Discharge Date: 05/01/2023, 12:00 PM  Discharge Delays: Complex Discharge  Placement - TCU  Other (Add Comment)  Destination: inpatient rehabilitation facility  Discharge Comments: Dialysis pt MWF. Will need placement TCU/LTC. [facility] willing to accept once no sitter, SW to follow up when sitter free          Jack Felix MD  Hospitalist Service  [facility]  Securely message with [employer] (more info)  Text page via [employer] Paging/Directory   ______________________________________________________________________    Interval History   No new issues or complaints     Physical Exam   Vital Signs: Temp: 97.8  F (36.6  C) Temp src: Oral BP: 100/60 Pulse: 68   Resp: 22 SpO2: 96 % O2 Device: None (Room air)    Weight: 183 lbs 6.76 oz  Awake and alert but confused as usual(per nurse)  Moving all extremities equally     Medical Decision Making       MANAGEMENT DISCUSSED with the following over the past 24 hours: patient and nurse   NOTE(S)/MEDICAL RECORDS REVIEWED over the past 24  hours: EPIC      Data     I have personally reviewed the following data over the past 24 hrs:    N/A  \   N/A   / N/A     138 98 93.8 (H) /  97   4.9 25 7.83 (H) \       ALT: N/A AST: N/A AP: N/A TBILI: N/A   ALB: 3.6 TOT PROTEIN: N/A LIPASE: N/A       Imaging results reviewed over the past 24 hrs:   No results found for this or any previous visit (from the past 24 hour(s)).

## 2023-04-25 ENCOUNTER — APPOINTMENT (OUTPATIENT)
Dept: OCCUPATIONAL THERAPY | Facility: CLINIC | Age: 58
DRG: 981 | End: 2023-04-25
Attending: STUDENT IN AN ORGANIZED HEALTH CARE EDUCATION/TRAINING PROGRAM
Payer: COMMERCIAL

## 2023-04-25 ENCOUNTER — APPOINTMENT (OUTPATIENT)
Dept: PHYSICAL THERAPY | Facility: CLINIC | Age: 58
DRG: 981 | End: 2023-04-25
Attending: STUDENT IN AN ORGANIZED HEALTH CARE EDUCATION/TRAINING PROGRAM
Payer: COMMERCIAL

## 2023-04-25 ENCOUNTER — APPOINTMENT (OUTPATIENT)
Dept: SPEECH THERAPY | Facility: CLINIC | Age: 58
DRG: 981 | End: 2023-04-25
Attending: HOSPITALIST
Payer: COMMERCIAL

## 2023-04-25 LAB
ALBUMIN SERPL BCG-MCNC: 3.7 G/DL (ref 3.5–5.2)
ANION GAP SERPL CALCULATED.3IONS-SCNC: 13 MMOL/L (ref 7–15)
BUN SERPL-MCNC: 49.6 MG/DL (ref 6–20)
CALCIUM SERPL-MCNC: 9.9 MG/DL (ref 8.6–10)
CHLORIDE SERPL-SCNC: 101 MMOL/L (ref 98–107)
CREAT SERPL-MCNC: 5.77 MG/DL (ref 0.67–1.17)
DEPRECATED HCO3 PLAS-SCNC: 27 MMOL/L (ref 22–29)
GFR SERPL CREATININE-BSD FRML MDRD: 11 ML/MIN/1.73M2
GLUCOSE SERPL-MCNC: 117 MG/DL (ref 70–99)
PHOSPHATE SERPL-MCNC: 6.1 MG/DL (ref 2.5–4.5)
POTASSIUM SERPL-SCNC: 4.2 MMOL/L (ref 3.4–5.3)
SODIUM SERPL-SCNC: 141 MMOL/L (ref 136–145)

## 2023-04-25 PROCEDURE — 250N000013 HC RX MED GY IP 250 OP 250 PS 637: Performed by: STUDENT IN AN ORGANIZED HEALTH CARE EDUCATION/TRAINING PROGRAM

## 2023-04-25 PROCEDURE — 97116 GAIT TRAINING THERAPY: CPT | Mod: GP

## 2023-04-25 PROCEDURE — 99231 SBSQ HOSP IP/OBS SF/LOW 25: CPT | Performed by: HOSPITALIST

## 2023-04-25 PROCEDURE — 92526 ORAL FUNCTION THERAPY: CPT | Mod: GN

## 2023-04-25 PROCEDURE — 120N000001 HC R&B MED SURG/OB

## 2023-04-25 PROCEDURE — 97112 NEUROMUSCULAR REEDUCATION: CPT | Mod: GP

## 2023-04-25 PROCEDURE — 80069 RENAL FUNCTION PANEL: CPT | Performed by: STUDENT IN AN ORGANIZED HEALTH CARE EDUCATION/TRAINING PROGRAM

## 2023-04-25 PROCEDURE — 250N000013 HC RX MED GY IP 250 OP 250 PS 637: Performed by: HOSPITALIST

## 2023-04-25 PROCEDURE — 250N000012 HC RX MED GY IP 250 OP 636 PS 637: Performed by: STUDENT IN AN ORGANIZED HEALTH CARE EDUCATION/TRAINING PROGRAM

## 2023-04-25 PROCEDURE — 36415 COLL VENOUS BLD VENIPUNCTURE: CPT | Performed by: STUDENT IN AN ORGANIZED HEALTH CARE EDUCATION/TRAINING PROGRAM

## 2023-04-25 PROCEDURE — 97530 THERAPEUTIC ACTIVITIES: CPT | Mod: GO | Performed by: OCCUPATIONAL THERAPIST

## 2023-04-25 PROCEDURE — 99232 SBSQ HOSP IP/OBS MODERATE 35: CPT | Performed by: INTERNAL MEDICINE

## 2023-04-25 PROCEDURE — 250N000013 HC RX MED GY IP 250 OP 250 PS 637: Performed by: PSYCHIATRY & NEUROLOGY

## 2023-04-25 RX ADMIN — MIDODRINE HYDROCHLORIDE 10 MG: 5 TABLET ORAL at 12:53

## 2023-04-25 RX ADMIN — LACOSAMIDE 100 MG: 100 TABLET, FILM COATED ORAL at 08:55

## 2023-04-25 RX ADMIN — ROPINIROLE HYDROCHLORIDE 0.5 MG: 0.25 TABLET, FILM COATED ORAL at 20:09

## 2023-04-25 RX ADMIN — MIDODRINE HYDROCHLORIDE 10 MG: 5 TABLET ORAL at 08:55

## 2023-04-25 RX ADMIN — RAMELTEON 8 MG: 8 TABLET, FILM COATED ORAL at 20:10

## 2023-04-25 RX ADMIN — APIXABAN 5 MG: 5 TABLET, FILM COATED ORAL at 20:10

## 2023-04-25 RX ADMIN — PANTOPRAZOLE SODIUM 40 MG: 40 TABLET, DELAYED RELEASE ORAL at 08:55

## 2023-04-25 RX ADMIN — LEVETIRACETAM 1000 MG: 500 TABLET, FILM COATED ORAL at 20:09

## 2023-04-25 RX ADMIN — LACTULOSE 10 G: 10 SOLUTION ORAL at 20:10

## 2023-04-25 RX ADMIN — TACROLIMUS 1 MG: 1 CAPSULE ORAL at 08:54

## 2023-04-25 RX ADMIN — SEVELAMER CARBONATE 800 MG: 800 TABLET, FILM COATED ORAL at 12:53

## 2023-04-25 RX ADMIN — MELATONIN TAB 3 MG 3 MG: 3 TAB at 20:10

## 2023-04-25 RX ADMIN — SEVELAMER CARBONATE 800 MG: 800 TABLET, FILM COATED ORAL at 17:19

## 2023-04-25 RX ADMIN — Medication 1 CAPSULE: at 08:55

## 2023-04-25 RX ADMIN — RIFAXIMIN 550 MG: 550 TABLET ORAL at 08:54

## 2023-04-25 RX ADMIN — LACTULOSE 10 G: 10 SOLUTION ORAL at 08:55

## 2023-04-25 RX ADMIN — LACOSAMIDE 100 MG: 100 TABLET, FILM COATED ORAL at 20:10

## 2023-04-25 RX ADMIN — MIDODRINE HYDROCHLORIDE 10 MG: 5 TABLET ORAL at 17:19

## 2023-04-25 RX ADMIN — TACROLIMUS 1 MG: 1 CAPSULE ORAL at 20:10

## 2023-04-25 RX ADMIN — APIXABAN 5 MG: 5 TABLET, FILM COATED ORAL at 08:55

## 2023-04-25 RX ADMIN — RIFAXIMIN 550 MG: 550 TABLET ORAL at 20:10

## 2023-04-25 RX ADMIN — ACETAMINOPHEN 650 MG: 325 TABLET, FILM COATED ORAL at 20:13

## 2023-04-25 RX ADMIN — SEVELAMER CARBONATE 800 MG: 800 TABLET, FILM COATED ORAL at 08:55

## 2023-04-25 RX ADMIN — FOLIC ACID 1 MG: 1 TABLET ORAL at 08:55

## 2023-04-25 ASSESSMENT — ACTIVITIES OF DAILY LIVING (ADL)
ADLS_ACUITY_SCORE: 52
ADLS_ACUITY_SCORE: 46
ADLS_ACUITY_SCORE: 52
ADLS_ACUITY_SCORE: 46
ADLS_ACUITY_SCORE: 46
ADLS_ACUITY_SCORE: 52
ADLS_ACUITY_SCORE: 52

## 2023-04-25 NOTE — PLAN OF CARE
DATE & TIME: 4/25/23 2603-7011  Cognitive Concerns/ Orientation : A&O X2; disoriented to time/situations; Forgetful  BEHAVIOR & AGGRESSION TOOL COLOR: Green  CIWA SCORE: N/A   ABNL VS/O2: BID; VSS on RA ex soft BP (99/61)  MOBILITY: SBA GB/W. Up to bathroom, No BM this shift  PAIN MANAGMENT: denies   DIET: Regular diet, mildly thick liquid (level 2) with meals, good appetitie. Can have thin liquids 30 minutes before & 30 minutes after meals. Takes pills whole with water  BOWEL/BLADDER: Continent of bowel (on lactulose), no BM this shift. pt also on HD  ABNL LAB/BG: n/a   DRAIN/DEVICES: dialysis port. PEG tube-clamped   TELEMETRY RHYTHM: NA  SKIN: Intact; jaundiced; scattered bruises  TESTS/PROCEDURES: HD M/W/F  D/C DATE: Pending   Discharge Barriers: out pt dialysis chair and TCU acceptance-CC following, referrals sent  OTHER IMPORTANT INFO: nephrology/PT/OT/SLP/CC following.

## 2023-04-25 NOTE — PLAN OF CARE
"Goal Outcome Evaluation:       DATE & TIME: 4/24/23-4/25/23 4299-5585  Cognitive Concerns/ Orientation : A&O X3   Forgetful  BEHAVIOR & AGGRESSION TOOL COLOR: Green  CIWA SCORE: N/A   ABNL VS/O2: BID VS, on RA  MOBILITY: SBA 1/gait belt/walker. Ambulated to BR. Patient getting up frequently this shift to d/t urge to use the BR  PAIN MANAGMENT: PRN Tylenol given for abdominal cramping/discomfort x1  DIET: Regular diet/ thickened liquid, can also have thin liquids 30 minutes before & 30 minutes after meals  BOWEL/BLADDER: Continent of bowel (on lactulose), pt also on dialysis  ABNL LAB/BG: Labs taken before dialysis 4/24, Creat 7.83 BUN 93.8 Phos 8.0, Mag 2.4  DRAIN/DEVICES: dialysis port  TELEMETRY RHYTHM: NA  SKIN: Scattered bruises  TESTS/PROCEDURES: Had dialysis 4/24  D/C DATE:Pending \"confirmation of outpatient dialysis chair time and TCU acceptance\" per CM note  Discharge Barriers:   OTHER IMPORTANT INFO: Per previous nurse, family requested to hold bedtime Trazodone                 "

## 2023-04-25 NOTE — PROGRESS NOTES
"[facility]    Nephrology Progress Note    Assessment & Plan     <ESRD    ~Medically stable. Main issue a currently is placement.    - Continue HD MWF.        <ANEMIA OF RENAL DISEASE    ~On epoeitin mirta maximum dose on dialysis.     <CKD BMD    ~Phos up to 8 yesterday. 5-6 prior. Written for rebecca.    Rainer Fitzpatrick MD  Nephrology  [facility]  Cell:[phone]  On Vocera   Pager:[phone]        .........    Interval History     Patient has been fairly active today. Has been ambulating with assistance and visiting with friends and family. His nephew David is here who will be heldpign take care of him when he leaves the hospital.    Overall is clear, but does have some confusion at times yet, such as asking questions without context, e.g.  \"How do you know?\" later meaning how does one know the kidney function is getting better.       Temp: 98  F (36.7  C) Temp src: Oral BP: 99/61 Pulse: 76   Resp: 20 SpO2: 95 % O2 Device: None (Room air)      Vitals:    04/19/23 0634 04/20/23 0645 04/24/23 0558   Weight: 80.8 kg (178 lb 3.2 oz) 80.3 kg (177 lb 1.6 oz) 83.2 kg (183 lb 6.8 oz)       Vital Signs with Ranges    Temp:  [98  F (36.7  C)] 98  F (36.7  C)  Pulse:  [76] 76  Resp:  [20] 20  BP: (99)/(61) 99/61  SpO2:  [95 %] 95 %    I/O last 3 completed shifts:  In: 590 [P.O.:560; NG/GT:30]  Out: -     Physical Exam    BP Readings from Last 5 Encounters:   04/25/23 99/61   01/21/23 120/71   12/29/22 (!) 139/94   12/16/22 101/63   12/16/22 117/75        Wt Readings from Last 10 Encounters:   04/24/23 83.2 kg (183 lb 6.8 oz)   01/21/23 82.4 kg (181 lb 11.2 oz)   12/28/22 96 kg (211 lb 10.3 oz)   12/16/22 100.2 kg (221 lb)   12/16/22 100.2 kg (221 lb)   12/15/22 87 kg (191 lb 12.8 oz)   10/07/19 137.5 kg (303 lb 3.2 oz)   10/04/19 131.5 kg (290 lb)   02/20/19 140.4 kg (309 lb 9.6 oz)   07/10/18 137.5 kg (303 lb 3.2 oz)     GENERAL: Alert, in no apparent distress. Speech fluent. "   HEENT:  Normocephalic. No gross abnormalities.  The mouth moist.    Neck The jugular venous pressure is not elevated  CV: RRR  RESP: Breathing unlabored   GI: Abdomen non-distended  MUSCULOSKELETAL: extremities nl - no gross deformities noted. No edema  SKIN: no new lesions or rashes, dry to touch. Pale and slight jaundice.   NEURO:  No overt deficit.  PSYCH: mood neutral affect somewhat flat.        Medications       - MEDICATION INSTRUCTIONS -       - MEDICATION INSTRUCTIONS -           - MEDICATION INSTRUCTIONS for Dialysis Patients -   Does not apply See Admin Instructions     apixaban ANTICOAGULANT  5 mg Oral BID     folic acid  1 mg Oral or Feeding Tube Daily     Lacosamide  100 mg Oral Q12H     lactulose  10 g Oral BID     levETIRAcetam  1,000 mg Oral Q24H     lidocaine   Transdermal Q8H BIJU     melatonin  3 mg Oral At Bedtime     menthol   Transdermal Q8H     midodrine  10 mg Oral or Feeding Tube TID w/meals     multivitamin RENAL  1 capsule Oral Daily     OLANZapine zydis  2.5 mg Oral Once per day on Mon Wed Fri     pantoprazole  40 mg Oral QAM AC     ramelteon  8 mg Oral QPM     rifaximin  550 mg Oral or Feeding Tube BID     rOPINIRole  0.5 mg Oral At Bedtime     sevelamer carbonate  800 mg Oral TID w/meals     sodium chloride (PF)  9 mL Intracatheter During Dialysis/CRRT (from stock)     sodium chloride (PF)  9 mL Intracatheter During Dialysis/CRRT (from stock)     tacrolimus  1 mg Oral Q12H     traZODone  50 mg Oral At Bedtime       Data     UA RESULTS:  Recent Labs   Lab Test 11/25/22  1859 10/31/17  1038   COLOR Yellow [place]   APPEARANCE Slightly Cloudy* Slightly Cloudy   URINEGLC Negative Negative   URINEBILI Negative Negative   URINEKETONE Negative 5*   SG 1.017 1.021   UBLD Moderate* Negative   URINEPH 5.5 5.0   PROTEIN 70* Negative   NITRITE Negative Negative   LEUKEST Small* Negative   RBCU  --  <1   WBCU  --  1      BMP  Recent Labs   Lab 04/25/23  1113 04/24/23  0815 04/23/23  0812  04/22/23  0821    138 137 136   POTASSIUM 4.2 4.9 4.5 4.5   CHLORIDE 101 98 98 99   KELLY 9.9 10.4* 10.4* 10.5*   CO2 27 25 25 27   BUN 49.6* 93.8* 85.8* 75.5*   CR 5.77* 7.83* 7.10* 6.16*   * 97 108* 134*     Phos@LABRCNTIPR(phos:4)  CBC)  Recent Labs   Lab 04/23/23  0812   HGB 8.4*     Lab Results   Component Value Date    ALBUMIN 3.7 04/25/2023    ALBUMIN 3.6 04/24/2023    ALBUMIN 3.5 04/23/2023    ALBUMIN 1.8 12/29/2022    ALBUMIN 1.7 12/28/2022    ALBUMIN 2.1 12/27/2022    ALBUMIN 3.9 04/20/2020    ALBUMIN 3.8 10/07/2019    ALBUMIN 3.8 02/20/2019        Recent Labs   Lab 04/23/23  0812 04/22/23  0821   HGB 8.4*  --    B12  --  989   FOLIC >40.0*  --        No lab results found in last 7 days.    Invalid input(s): BILIRUBININDIRECT  No lab results found in last 7 days.  25 OH Vit D2   Date Value Ref Range Status   09/24/2016 <5 ug/L Final     25 OH Vitamin D2   Date Value Ref Range Status   02/16/2023 <5 ug/L Final     25 OH Vit D3   Date Value Ref Range Status   09/24/2016 8 ug/L Final     25 OH Vitamin D3   Date Value Ref Range Status   02/16/2023 37 ug/L Final     25 OH Vit D total   Date Value Ref Range Status   09/24/2016 (L) 20 - 75 ug/L Final    <13  Season, race, dietary intake, and treatment affect the concentration of   25-hydroxy-Vitamin D. Values may decrease during winter months and increase   during summer months. Values 20-29 ug/L may indicate Vitamin D insufficiency   and values <20 ug/L may indicate Vitamin D deficiency.   This test was developed and its performance characteristics determined by the   [facility],  Special [facility] Laboratory. It has   not been cleared or approved by the FDA. The laboratory is regulated under CLIA   as qualified to perform high-complexity testing. This test is used for clinical   purposes. It should not be regarded as investigational or for research.       25 OH Vit D Total   Date Value Ref Range Status   02/16/2023 <42 20  - 75 ug/L Final     Comment:     Season, race, dietary intake, and treatment affect the concentration of 25-hydroxy-Vitamin D. Values may decrease during winter months and increase during summer months. Values 20-29 ug/L may indicate Vitamin D insufficiency and values <20 ug/L may indicate Vitamin D deficiency.     No results for input(s): PTHI in the last 168 hours.    Attestation:   I have reviewed today's relevant vital signs, notes, medications, labs and imaging.

## 2023-04-25 NOTE — PROGRESS NOTES
"CLINICAL NUTRITION SERVICES - REASSESSMENT NOTE     Nutrition Prescription    RECOMMENDATIONS FOR MDs/PROVIDERS TO ORDER:  None at this time    Malnutrition Status:   Severe malnutrition in the context of acute on chronic illness or disease (noted 3/16)    Recommendations already ordered by Registered Dietitian (RD):  - continue regular diet (modifications per SLP; renal diet restrictions deferred to promote adequate PO intake) + oral nutrition supplements + room service assistance as ordered (noted request for breakfast as early as possible on dialysis days)  - Family may bring in meals/snacks as desired, small/frequent meals enouraged.      - TF formula available for back-up boluses.   Novasource Renal at 80 mL/hr x 3 hours PRN back up bolus if consumes <50%.   Per Bolus Provisions: 240 mL formula = 480 kcal, 22 g protein, 44 g CHO, 0 g fiber and 172 mL free water  Free Water Flush: 60 mL before and after each feeding  Noted plan to keep G tube in place for potential decompensation    Future/Additional Recommendations:  Continue to monitor appetite/intake including acceptance/intake of supplements, weight trends, pertinent lab values, and bowel movements.      EVALUATION OF THE PROGRESS TOWARD GOALS   Diet: Regular + Mildly thick (level 2 ) liquids + Supplements (Gelatein BID and Magic Cup daily)    Nutrition Support:   Back up boluses- Novasource Renal bolus of 240 mL when meal intake is <50%  Per Bolus Provisions: 240 mL formula = 480 kcal, 22 g protein, 44 g CHO, 0 g fiber and 172 mL free water  Free Water Flush: 60 mL before and after each feeding  Has not needed bolus feedings. Last bolus was 3/22 if adequately documented.    Note: Per Hospitalist note on 4/24, \"would favor keeping the g tube in place given overall poor prognosis with ESRD, cirrhosis of transplanted liver, persistent encephalopathy, and inadequate oral intake. Even if he refuses, he is at risk for developing new infection or other " "decompensation and thus g tube as safety net.\"    Intake: % in the last week. 50-75% in past 2 days, % from 4/18-4/22.      NEW FINDINGS   Pt reports good appetite, and enjoys the supplements he is currently receiving (gelatein plus and magic cup). Family in the room reports bringing food in for the pt, as he is having some menu fatigue d/t prolonged hospitalization. Discussed protein-containing foods, and encouraged consumption of small/frequent meals with protein at each sitting. Continues on dialysis M/W/F.    Labs:  Na 141 (WNL)  BUN 49.6 (H), Cr 5.77 (H) -> trending down  Phos 6.1 (H), Mg 2.4 (H), K 4.2 (WNL)  Glucose 117 (H)  Hgb 8.4 (L)    Medications:  Folic acid 1 mg  Lactulose  Nephrocaps  Protonix  Renvela  Tacrolimus  PRN: Tums, Zofran    GI: 0-3 BM/day in the past week, mostly soft/brown, some loose/watery    Skin: Per nursing notes- Intact; jaundiced; scattered bruises    Weights:  Date/Time Weight Weight Method   04/24/23 0558 83.2 kg (183 lb 6.8 oz) Standing scale   04/20/23 0645 80.3 kg (177 lb 1.6 oz) Standing scale   04/19/23 0634 80.8 kg (178 lb 3.2 oz) --   04/18/23 0343 80.4 kg (177 lb 3.2 oz) --   04/17/23 0700 82.1 kg (180 lb 14.4 oz) --   04/12/23 0522 80.8 kg (178 lb 2.1 oz) Bed scale   04/10/23 1356 81.4 kg (179 lb 8 oz) Standing scale   04/10/23 0746 82.2 kg (181 lb 4.8 oz) Standing scale   Lowest weight on file 3/29 (76.1 kg)- fluctuations in weight r/t fluid status in the setting of dialysis. Generalized trace edema noted per flowsheets.    ASSESSED NUTRITION NEEDS:  Dosing Weight: 76.1 kg   Estimated Energy Needs: 3746-8482+ kcals (25-30+ Kcal/Kg)  Justification: dialysis  Estimated Protein Needs: + grams protein (1.2-1.5+ g pro/Kg)  Justification: dialysis    MALNUTRITION (3/16)  % Weight Loss:  > 5% in 1 month (severe malnutrition)   % Intake:  No longer applies   Subcutaneous Fat Loss:  Orbital region moderate depletion, Upper arm region moderate-severe depletion " and Thoracic region moderate-severe depletion  Muscle Loss:  Temporal region moderate depletion, Clavicle bone region severe depletion, Acromion bone region severe depletion, Patellar region moderate depletion and Anterior thigh region moderate-severe depletion  Fluid Retention:  Trace  Malnutrition Diagnosis: Severe malnutrition in the context of acute on chronic illness or disease    Previous Goals   Intake of >/=75% adequate meals TID on average.   Evaluation: Met    Previous Nutrition Diagnosis  Malnutrition related to prolonged hospitalization in setting of chronic illness and poor prognosis per MD as evidenced by variable intakes with previous G tube reliance, increased needs with HD  Evaluation: No change    CURRENT NUTRITION DIAGNOSIS  Malnutrition related to prolonged hospitalization in setting of chronic illness and poor prognosis per MD as evidenced by variable intakes with previous G tube reliance, increased needs with HD     INTERVENTIONS  Implementation  Encouraged consumption of high protein foods, small/frequent meals  Continue current supplement order (Gelatein plus BID and Magic cup daily)  Continue Room Service with Assist as ordered (noted request for breakfast as early as possible on dialysis days)    Goals  Intake of >/=75% adequate meals TID on average.     Monitoring/Evaluation  Progress toward goals will be monitored and evaluated per protocol.    Jazmin Goetz  Dietetic Intern

## 2023-04-25 NOTE — PROGRESS NOTES
[facility]    Medicine Progress Note - Hospitalist Service    Date of Admission:  1/21/2023    Assessment & Plan   Blanco Osborne is a 57 year-old male admitted on 1/21/2023 as a transfer from [facility] for evaluation of loculated pleural effusion. He has extensive PMH including h/o liver transplant 2016 due to alcohol abuse, pAF on chronic anticoagulation, history of diabetes mellitus type 2, CVA, hypertension, CHRISTIAN on BiPAP.  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 He was recovering in [facility] and was transferred to [facility] for consideration of chest tube placement and possible intrapleural lysis for worsening effusion    1. Acute metabolic encephalopathy with delirium, multifactorial, -resolving and now more chronic, hepatic encephalopathy  Chronic liver disease, history of liver transplant 2016  ESRD on HD  History of seizure, on Keppra and Vimpat     Continuing to have waxing and waning mentation,  Earlier in hospital stay, remained confused and had pulled out tracheostomy tube, PICC line and pulled his dialysis catheter- replaced.    Mental status continues to fluctuate with periods of alertness and increased sleepiness but without agitation for several days and overall improving.  Off restraints and sitter for several days.    Complicated, prolonged hospital course with mental status concerns multifactorial related to hepatic encephalopathy, end-stage renal disease on dialysis, past PEA arrest, seizure disorder, medication and chronic liver disease with prior liver transplant.     Continue to reorient and redirect as able. Maintain normal day/night cycles and sleep-wake cycles.  Minimize sedating medications as able.  Remains weak and deconditioned with complex,  prolonged hospital course and limited mobility.  Encouragement and support offered daily to patient.    Continue Lactulose to 10 mg BID, monitor for symptoms; goal to have 2-3 bowel movements per day.    Ongoing hemodialysis, as per nephrology, 3X/week dialysis schedule. Nephrology following    Continue ramelteon 8 mg at bedtime; monitor for side effects including AM sedation, reassess daily.    Started on olanzapine 5 mg BID and PRN earlier during this hospitalization. Delirium improved/resolved. Now scheduled HS dose discontinued. Plan to stop the doses with HD as well in upcoming days. Psychiatry recommended  trazodone 25 mg at bedtime, increased to 50 mg. Melatonin as well added.     Continue Keppra and Vimpat. No recurrent seizures reported of recent.     Neurology consult appreciated.  Patient has been started on ropinirole for restless leg syndrome.     Family stating that he is doing well with weaning down on zyprexa  -----currently receiving doses 3x's/week     2. Bilateral pleural effusions   Acute respiratory failure requiring tracheostomy  - resolved    Pneumonia  - resolved   ARDS  - resolved    Right pneumothorax - resolved   *Initial event was parainfluenza and strep pneumo pneumonia back in November 2022. Treated for ARDS. Had failed extubation x2 and tracheostomy performed on previous hospitalization. *Had additional complications of bilateral pneumothoraces as well as hydropneumothorax- pleural fluid grew candida parapsilosis  *Completed 4-week antifungal treatment. While in LTACH he was successfully weaned from the ventilator.  *Recently there were concern for worsening effusion while at [facility] and had thoracentesis 01/13 which returned exudative without growth on cultures. CT chest/abdomen/pelvis on 01/18 demonstrated worsening effusions and concerns for infected parapneumonic effusions with possible SBP.  Multiple pulmonologist at [facility] reviewed CT scan recommended transfer to [facility] for  consideration of chest tube placement and possible intrapleural lysis  *Thoracic surgery (Dr. Jackson) consulted during admission. CT chest small effusions on imaging and so chest tube was not inserted.   * ID and pulmonary as well consulted this stay and have since signed off. No abx.   *Patient pulled out his tracheostomy tube on the night 2/1-2/2 and is tolerating well without increased shortness of breath persistent cough or worsening hypoxia.     Stable on room air now    Encourage incentive spirometry as able, up to chair, deep breathing.    Tracheostomy site has healed now     3.  Possible splenic infract  Wedge shaped area on CT scan chest and CT abdomen on 3/7/23.    Hematology consulted, suspicion for infarct was low.    TTE no thrombus or vegetations.     4. S/p liver transplant 2016   Chronic immunosuppression, on tacrolimus  Cirrhosis with ascites  Subacute bacterial peritonitis (SBP), resolved  *Main manifestations of this are hepatic encephalopathy and ascites.  Hepatologist at [place] felt that most likely reason for the cirrhosis was metabolic syndrome or alcohol intake.  There was no evidence of rejection on biopsy and there was some evidence of regeneration. Paracentesis done on 12/22/2022 showed lactobacillus indicating SBP, treated with Unasyn and Augmentin, finished 2-week course 1/12/2023.  Requires large-volume paracentesis periodically for recurring ascites.    *Paracentesis 1/13/2023 yielded about 7500 cc. Cultures NGTD. paracentesis on 1/24 with 4.8 L fluid removal  Paracentesis on 3/6/23 No SBP    CT abdomen 3/9 small to moderate ascites. Continue intermittent paracentesis as needed if develops symptomatic ascites.    Monitor for signs and symptoms of recurrent spontaneous bacterial peritonitis.    Further treatment for recurrent ascites with TIPS deferred to his Liver Transplant team and/or Hepatology, he had an appointment on 4/21/2023 with Hepatology, Dr. Simms but needs to  "reschedule given ongoing hosptial stay.    Continue Tacrolimus 1 mg BID, rifaximin 550 mg BID.    Continue lactulose as ordered, titrate as needed to have 2-3 BMs    GI consult as needed in hospital for new acute issues, otherwise as outpatient.    Encourgae high protein diet.     5. Goals of care   As per prior rounding provider, \"on 1/25 nursing had mentioned that patient had refused to undergo dialysis and want to stop care, palliative care was consulted.  Patient and his spouse denied wanting to stop care and wanted ongoing restorative care including full code\"    Full Code status.    Needs placement to TCU -> SW working on placement  Vs home if not able to find a TCU,  Per wife     6. Diarrhea, improved, on lactulose for chronic liver disease    C difficile negative on 1/31. Secondary to lactulose. Discontinued rectal tube (2/12) as stools more formed.    Continue lactulose with goal of 2 to 3 soft bowel movement in 24 hours.     7. Paroxysmal atrial fibrillation  Chronic anticoagulation, apixaban  History of embolic strokes  Remains in sinus rhythm, on anticoagulation with apixaban indefinitely for stroke prophylaxis.      Continue apixaban anticoagulation, it was briefly held for fistula placement.    Monitor hemoglobin, see below.     8. Acute anemia, normocytic  Patient has required intermittent transfusions for on-going anemia.  Anemia most likely secondary to critical illness/chronic disease, chronic renal disease.  Baseline hemoglobin around 7-8.  Likely Epo resistance.    Hemoglobin fairly stable now between 8-9    Last hgb 8.9 (4/14/23)    Family would like a vit B12 level drawn  (can do as an add-on)    On daily folate supplements    Monitor intermittently.     9. Hypercalcemia  Work-up shows low/low normal vitamin D, suppressed PTH.  But PTH related peptide is elevated.    Nephrology following and considered prolia, however patient's wife would like to hold off for now.    Holding VIT D and " Phoslo.     Discussed with Dr Moulton regarding elevated PTHrP, suspected due to prolonged immobility. Plan is to interval follow up in a month or so. If trends up, then will plan forther work up.      10. History PEA arrest   PEA arrest prior to his previous admission and 1 episode of PEA associated with desaturation on 12/20.  No structural cardiac disease.      11. Chronic Hypotension  In the past has developed baseline hypotension with cirrhosis and prolonged critical illness.  On hemodialysis.  Receiving midodrine and albumin during dialysis.    Continue midodrine       12. History of seizure   After hypoxic event, in the context of baseline multifactorial encephalopathy secondary to his ongoing critical illness and prior cardiac arrests and prior strokes and cirrhosis with hepatic encephalopathy. MRI of head of 12/20/22 reveals no PRES or leptomeningeal enhancement but scattered.  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   No recent seizure activity.    Continue levetiracetam, lacosamide.    Seizure precautions.    Outpatient follow-up with neurology      13. ESRD  Anemia due to renal disease  Hypotension during dialysis  Hyperphosphatemia    Nephrology reviewing vein mapping to assess options for internal access placement for dialysis, see note 3/16.    Underwent Fistula placement on  3/21/22.    Vascular surgery following-continue to use for tunnel catheter.  Outpatient follow-up with vascular surgery and for will need a follow-up ultrasound of the fistula in 6 weeks to assess patency.    Started sevalmer 800mg TID with meals on 3/27/23.    Complained of cramping with dialysis. Tried a dose of oxycodone prior to dialysis but this was not helpful. Spouse prefers to avoid narcotic given encephalopathy.      Vitamin  B12 level within normal  limits     14. Diabetes mellitus type 2  *Hemoglobin A1c on November 2022 was 4.7  *By report, on Lantus insulin in  the past.  Blood sugar checks and sliding scale insulin previously discontinued as has been stable without insulin needs.    Monitor with periodic blood sugar checks as needed.     15. Hypomagnesemia    Resolved with replacement.     16. Severe malnutrition, protein and calorie type  Moderate dysphagia  G-tube feedings    Continue with tube feeds via PEG tube.    IR replaced the PEG tube on 2/8/2023, monitor.    Nutritionist consulted and following for tube feeds.    SLP following as well. Oral diet as per speech and language therapy (SLP). Patient hopeful to advance to thin liquids soon.    2/20 Nutrition following for tube feeds.    Now undergoing PRN tube feeds.    Encourage high-protein diet.    Nutrition notes reviewed, patient eating 0 to 100% of meals.  Continue current interventions.  As needed tube feed boluses available if patient consumes less than 50% of the meal.    Family encouraging patient to eat high protein diet including protein bars    Family had asked about g tube removal 4/21/23.  Per the EMR, last tube feed bolus was 3/22 if adequately documented. He refuses his tube feeds often per the dietitician. At this point, would favor keeping it in place given overall poor prognosis with ESRD, cirrhosis of transplanted liver, persistent encephalopathy, and inadequate oral intake. Event if he refuses, he is at risk for developing new infection or other decompensation and thus g tube as safety net.  -  This can continue to be re-addressed in f/up if he continues to do well and  There is really no need for it.     17. Anxiety  Fluoxetine was discontinued as per psychiatry recommendation on 2/2 (see consult note for details).    Psychiatry follow-up.     18. Restless leg  Syndrome    Patient started on ropiranole by neurology.    4/25- no new issues, continue present care      Diet: Snacks/Supplements Adult: Other; Gelatein+ with brkfst and lunch, and magic cup with dinner (RD); With Meals  Combination  Diet Regular Diet; Mildly Thick (level 2) (OK for fresh fruit (e.g., pineapple) per SLP)  Adult Formula Bolus Feeding: Novasource Renal; Route: Gastrostomy; 3 times daily; Volume per Bolus: 240; mL(s); Back up bolus for when pt consumes <50% of meals: 80 mL/hr x 3 hrs  Room Service    DVT Prophylaxis: Pneumatic Compression Devices  Nixon Catheter: Not present  Lines: PRESENT      CVC Double Lumen Right External jugular Tunneled-Site Assessment: WDL  Hemodialysis Vascular Access Arteriovenous fistula Left Forearm-Site Assessment: WDL      Cardiac Monitoring: None  Code Status: Full Code      Clinically Significant Risk Factors           # Hypercalcemia: Highest Ca = 10.4 mg/dL in last 2 days, will monitor as appropriate    # Hypoalbuminemia: Lowest albumin = 1.9 g/dL at 1/23/2023  6:38 AM, will monitor as appropriate            # Severe Malnutrition: based on nutrition assessment        Disposition Plan      Expected Discharge Date: 04/28/2023, 12:00 PM  Discharge Delays: Complex Discharge  Placement - TCU  Other (Add Comment)  Destination: inpatient rehabilitation facility  Discharge Comments: Dialysis pt MWF. Will need placement TCU/LTC. [facility] willing to accept once no sitter, SW to follow up when sitter free          Jack Felix MD  Hospitalist Service  [facility]  Securely message with [employer] (more info)  Text page via [employer] Paging/Directory   ______________________________________________________________________    Interval History   No new issues or complaints     Physical Exam   Vital Signs: Temp: 98  F (36.7  C) Temp src: Oral BP: 99/61 Pulse: 76   Resp: 20 SpO2: 95 % O2 Device: None (Room air)    Weight: 183 lbs 6.76 oz  More awake and conversant than yesterday.   Moving all extremities equally     Medical Decision Making       MANAGEMENT DISCUSSED with the following over the past 24 hours: patient and CC  NOTE(S)/MEDICAL RECORDS REVIEWED over the past 24 hours: EPIC       Data     I have personally reviewed the following data over the past 24 hrs:    N/A  \   N/A   / N/A     141 101 49.6 (H) /  117 (H)   4.2 27 5.77 (H) \       ALT: N/A AST: N/A AP: N/A TBILI: N/A   ALB: 3.7 TOT PROTEIN: N/A LIPASE: N/A       Imaging results reviewed over the past 24 hrs:   No results found for this or any previous visit (from the past 24 hour(s)).

## 2023-04-26 ENCOUNTER — APPOINTMENT (OUTPATIENT)
Dept: OCCUPATIONAL THERAPY | Facility: CLINIC | Age: 58
DRG: 981 | End: 2023-04-26
Attending: STUDENT IN AN ORGANIZED HEALTH CARE EDUCATION/TRAINING PROGRAM
Payer: COMMERCIAL

## 2023-04-26 LAB
ALBUMIN SERPL BCG-MCNC: 3.8 G/DL (ref 3.5–5.2)
ANION GAP SERPL CALCULATED.3IONS-SCNC: 9 MMOL/L (ref 7–15)
BUN SERPL-MCNC: 29.4 MG/DL (ref 6–20)
CALCIUM SERPL-MCNC: 9.1 MG/DL (ref 8.6–10)
CHLORIDE SERPL-SCNC: 102 MMOL/L (ref 98–107)
CREAT SERPL-MCNC: 3.56 MG/DL (ref 0.67–1.17)
DEPRECATED HCO3 PLAS-SCNC: 30 MMOL/L (ref 22–29)
GFR SERPL CREATININE-BSD FRML MDRD: 19 ML/MIN/1.73M2
GLUCOSE SERPL-MCNC: 132 MG/DL (ref 70–99)
PHOSPHATE SERPL-MCNC: 3.2 MG/DL (ref 2.5–4.5)
POTASSIUM SERPL-SCNC: 3.8 MMOL/L (ref 3.4–5.3)
SODIUM SERPL-SCNC: 141 MMOL/L (ref 136–145)

## 2023-04-26 PROCEDURE — 90937 HEMODIALYSIS REPEATED EVAL: CPT

## 2023-04-26 PROCEDURE — 250N000013 HC RX MED GY IP 250 OP 250 PS 637: Performed by: STUDENT IN AN ORGANIZED HEALTH CARE EDUCATION/TRAINING PROGRAM

## 2023-04-26 PROCEDURE — 36415 COLL VENOUS BLD VENIPUNCTURE: CPT | Performed by: STUDENT IN AN ORGANIZED HEALTH CARE EDUCATION/TRAINING PROGRAM

## 2023-04-26 PROCEDURE — 97535 SELF CARE MNGMENT TRAINING: CPT | Mod: GO

## 2023-04-26 PROCEDURE — 258N000003 HC RX IP 258 OP 636: Performed by: INTERNAL MEDICINE

## 2023-04-26 PROCEDURE — 250N000011 HC RX IP 250 OP 636: Performed by: INTERNAL MEDICINE

## 2023-04-26 PROCEDURE — 250N000013 HC RX MED GY IP 250 OP 250 PS 637: Performed by: PSYCHIATRY & NEUROLOGY

## 2023-04-26 PROCEDURE — 82040 ASSAY OF SERUM ALBUMIN: CPT | Performed by: STUDENT IN AN ORGANIZED HEALTH CARE EDUCATION/TRAINING PROGRAM

## 2023-04-26 PROCEDURE — 90935 HEMODIALYSIS ONE EVALUATION: CPT | Performed by: INTERNAL MEDICINE

## 2023-04-26 PROCEDURE — 250N000012 HC RX MED GY IP 250 OP 636 PS 637: Performed by: STUDENT IN AN ORGANIZED HEALTH CARE EDUCATION/TRAINING PROGRAM

## 2023-04-26 PROCEDURE — 634N000001 HC RX 634: Performed by: INTERNAL MEDICINE

## 2023-04-26 PROCEDURE — 250N000013 HC RX MED GY IP 250 OP 250 PS 637: Performed by: HOSPITALIST

## 2023-04-26 PROCEDURE — 120N000001 HC R&B MED SURG/OB

## 2023-04-26 PROCEDURE — 99231 SBSQ HOSP IP/OBS SF/LOW 25: CPT | Performed by: HOSPITALIST

## 2023-04-26 RX ADMIN — ACETAMINOPHEN 650 MG: 325 TABLET, FILM COATED ORAL at 10:15

## 2023-04-26 RX ADMIN — SODIUM CHLORIDE 250 ML: 9 INJECTION, SOLUTION INTRAVENOUS at 08:36

## 2023-04-26 RX ADMIN — TACROLIMUS 1 MG: 1 CAPSULE ORAL at 08:06

## 2023-04-26 RX ADMIN — ACETAMINOPHEN 650 MG: 325 TABLET, FILM COATED ORAL at 06:18

## 2023-04-26 RX ADMIN — RAMELTEON 8 MG: 8 TABLET, FILM COATED ORAL at 19:35

## 2023-04-26 RX ADMIN — SEVELAMER CARBONATE 800 MG: 800 TABLET, FILM COATED ORAL at 12:07

## 2023-04-26 RX ADMIN — HEPARIN SODIUM 1900 UNITS: 1000 INJECTION INTRAVENOUS; SUBCUTANEOUS at 11:40

## 2023-04-26 RX ADMIN — MIDODRINE HYDROCHLORIDE 10 MG: 5 TABLET ORAL at 18:00

## 2023-04-26 RX ADMIN — APIXABAN 5 MG: 5 TABLET, FILM COATED ORAL at 08:06

## 2023-04-26 RX ADMIN — ROPINIROLE HYDROCHLORIDE 0.5 MG: 0.25 TABLET, FILM COATED ORAL at 19:48

## 2023-04-26 RX ADMIN — RIFAXIMIN 550 MG: 550 TABLET ORAL at 19:39

## 2023-04-26 RX ADMIN — Medication 2.5 MG: at 10:15

## 2023-04-26 RX ADMIN — LACTULOSE 10 G: 10 SOLUTION ORAL at 19:40

## 2023-04-26 RX ADMIN — LACOSAMIDE 100 MG: 100 TABLET, FILM COATED ORAL at 19:38

## 2023-04-26 RX ADMIN — LEVETIRACETAM 1000 MG: 500 TABLET, FILM COATED ORAL at 19:36

## 2023-04-26 RX ADMIN — MIDODRINE HYDROCHLORIDE 10 MG: 5 TABLET ORAL at 06:18

## 2023-04-26 RX ADMIN — APIXABAN 5 MG: 5 TABLET, FILM COATED ORAL at 19:36

## 2023-04-26 RX ADMIN — Medication: at 08:37

## 2023-04-26 RX ADMIN — RIFAXIMIN 550 MG: 550 TABLET ORAL at 08:06

## 2023-04-26 RX ADMIN — MELATONIN TAB 3 MG 3 MG: 3 TAB at 19:39

## 2023-04-26 RX ADMIN — MIDODRINE HYDROCHLORIDE 10 MG: 5 TABLET ORAL at 12:07

## 2023-04-26 RX ADMIN — Medication 1 CAPSULE: at 08:06

## 2023-04-26 RX ADMIN — PANTOPRAZOLE SODIUM 40 MG: 40 TABLET, DELAYED RELEASE ORAL at 08:06

## 2023-04-26 RX ADMIN — ACETAMINOPHEN 650 MG: 325 TABLET, FILM COATED ORAL at 19:44

## 2023-04-26 RX ADMIN — SEVELAMER CARBONATE 800 MG: 800 TABLET, FILM COATED ORAL at 08:05

## 2023-04-26 RX ADMIN — LACTULOSE 10 G: 10 SOLUTION ORAL at 08:05

## 2023-04-26 RX ADMIN — FOLIC ACID 1 MG: 1 TABLET ORAL at 08:06

## 2023-04-26 RX ADMIN — SEVELAMER CARBONATE 800 MG: 800 TABLET, FILM COATED ORAL at 18:00

## 2023-04-26 RX ADMIN — TACROLIMUS 1 MG: 1 CAPSULE ORAL at 19:35

## 2023-04-26 RX ADMIN — EPOETIN ALFA-EPBX 10000 UNITS: 10000 INJECTION, SOLUTION INTRAVENOUS; SUBCUTANEOUS at 08:37

## 2023-04-26 RX ADMIN — SODIUM CHLORIDE 300 ML: 9 INJECTION, SOLUTION INTRAVENOUS at 08:36

## 2023-04-26 RX ADMIN — LACOSAMIDE 100 MG: 100 TABLET, FILM COATED ORAL at 08:05

## 2023-04-26 ASSESSMENT — ACTIVITIES OF DAILY LIVING (ADL)
ADLS_ACUITY_SCORE: 48
ADLS_ACUITY_SCORE: 52
ADLS_ACUITY_SCORE: 48
ADLS_ACUITY_SCORE: 48
ADLS_ACUITY_SCORE: 52
ADLS_ACUITY_SCORE: 48
ADLS_ACUITY_SCORE: 52
ADLS_ACUITY_SCORE: 48

## 2023-04-26 NOTE — PROGRESS NOTES
[facility]    Nephrology Progress Note    Assessment & Plan     <ESRD    ~Medically stable. Main issue a currently is placement.    - Continue HD MWF.        <ANEMIA OF RENAL DISEASE    ~On epoeitin mirta maximum dose on dialysis.     <CKD BMD    ~On sevelamer.    Rainer Fitzpatrick MD  Nephrology  [facility]  Cell:[phone]  On Matthieu   Pager:[phone]        .........    Interval History     Seen on dialysis. Intermittent AMS, but calm on treatment. No issues with 2 liters of UF.      Temp: 97.9  F (36.6  C) Temp src: Oral BP: 115/67 Pulse: 69   Resp: 22 SpO2: 95 % O2 Device: None (Room air)      Vitals:    04/20/23 0645 04/24/23 0558 04/26/23 0524   Weight: 80.3 kg (177 lb 1.6 oz) 83.2 kg (183 lb 6.8 oz) 82.3 kg (181 lb 7 oz)       Vital Signs with Ranges    Temp:  [97.1  F (36.2  C)-98  F (36.7  C)] 97.9  F (36.6  C)  Pulse:  [64-83] 69  Resp:  [7-36] 22  BP: ()/(49-72) 115/67  SpO2:  [94 %-97 %] 95 %    I/O last 3 completed shifts:  In: 470 [P.O.:440; NG/GT:30]  Out: -     Physical Exam    BP Readings from Last 5 Encounters:   04/26/23 115/67   01/21/23 120/71   12/29/22 (!) 139/94   12/16/22 101/63   12/16/22 117/75        Wt Readings from Last 10 Encounters:   04/26/23 82.3 kg (181 lb 7 oz)   01/21/23 82.4 kg (181 lb 11.2 oz)   12/28/22 96 kg (211 lb 10.3 oz)   12/16/22 100.2 kg (221 lb)   12/16/22 100.2 kg (221 lb)   12/15/22 87 kg (191 lb 12.8 oz)   10/07/19 137.5 kg (303 lb 3.2 oz)   10/04/19 131.5 kg (290 lb)   02/20/19 140.4 kg (309 lb 9.6 oz)   07/10/18 137.5 kg (303 lb 3.2 oz)     GENERAL: Alert, in no apparent distress.   HEENT:  Normocephalic. No gross abnormalities.  The mouth moist.    Neck The jugular venous pressure is not elevated  CV: RRR  RESP: Breathing unlabored   GI: Abdomen non-distended  MUSCULOSKELETAL: extremities nl - no gross deformities noted. No edema  SKIN: no new lesions or rashes, dry to touch.  NEURO:  No overt deficit.  PSYCH:  mood and affect neutral.        Medications       - MEDICATION INSTRUCTIONS -       - MEDICATION INSTRUCTIONS -           - MEDICATION INSTRUCTIONS for Dialysis Patients -   Does not apply See Admin Instructions     apixaban ANTICOAGULANT  5 mg Oral BID     folic acid  1 mg Oral or Feeding Tube Daily     Lacosamide  100 mg Oral Q12H     lactulose  10 g Oral BID     levETIRAcetam  1,000 mg Oral Q24H     lidocaine   Transdermal Q8H BIJU     melatonin  3 mg Oral At Bedtime     menthol   Transdermal Q8H     midodrine  10 mg Oral or Feeding Tube TID w/meals     multivitamin RENAL  1 capsule Oral Daily     OLANZapine zydis  2.5 mg Oral Once per day on Mon Wed Fri     pantoprazole  40 mg Oral QAM AC     ramelteon  8 mg Oral QPM     rifaximin  550 mg Oral or Feeding Tube BID     rOPINIRole  0.5 mg Oral At Bedtime     sevelamer carbonate  800 mg Oral TID w/meals     tacrolimus  1 mg Oral Q12H     traZODone  50 mg Oral At Bedtime       Data     UA RESULTS:  Recent Labs   Lab Test 11/25/22  1859 10/31/17  1038   COLOR Yellow [place]   APPEARANCE Slightly Cloudy* Slightly Cloudy   URINEGLC Negative Negative   URINEBILI Negative Negative   URINEKETONE Negative 5*   SG 1.017 1.021   UBLD Moderate* Negative   URINEPH 5.5 5.0   PROTEIN 70* Negative   NITRITE Negative Negative   LEUKEST Small* Negative   RBCU  --  <1   WBCU  --  1      BMP  Recent Labs   Lab 04/25/23  1113 04/24/23  0815 04/23/23  0812 04/22/23  0821    138 137 136   POTASSIUM 4.2 4.9 4.5 4.5   CHLORIDE 101 98 98 99   KELLY 9.9 10.4* 10.4* 10.5*   CO2 27 25 25 27   BUN 49.6* 93.8* 85.8* 75.5*   CR 5.77* 7.83* 7.10* 6.16*   * 97 108* 134*     Phos@[email or web address](phos:4)  CBC)  Recent Labs   Lab 04/23/23  0812   HGB 8.4*     Lab Results   Component Value Date    ALBUMIN 3.7 04/25/2023    ALBUMIN 3.6 04/24/2023    ALBUMIN 3.5 04/23/2023    ALBUMIN 1.8 12/29/2022    ALBUMIN 1.7 12/28/2022    ALBUMIN 2.1 12/27/2022    ALBUMIN 3.9 04/20/2020    ALBUMIN 3.8 10/07/2019     ALBUMIN 3.8 02/20/2019        Recent Labs   Lab 04/23/23  0812 04/22/23  0821   HGB 8.4*  --    B12  --  989   FOLIC >40.0*  --        No lab results found in last 7 days.    Invalid input(s): BILIRUBININDIRECT  No lab results found in last 7 days.  25 OH Vit D2   Date Value Ref Range Status   09/24/2016 <5 ug/L Final     25 OH Vitamin D2   Date Value Ref Range Status   02/16/2023 <5 ug/L Final     25 OH Vit D3   Date Value Ref Range Status   09/24/2016 8 ug/L Final     25 OH Vitamin D3   Date Value Ref Range Status   02/16/2023 37 ug/L Final     25 OH Vit D total   Date Value Ref Range Status   09/24/2016 (L) 20 - 75 ug/L Final    <13  Season, race, dietary intake, and treatment affect the concentration of   25-hydroxy-Vitamin D. Values may decrease during winter months and increase   during summer months. Values 20-29 ug/L may indicate Vitamin D insufficiency   and values <20 ug/L may indicate Vitamin D deficiency.   This test was developed and its performance characteristics determined by the   [facility],  Special Chemistry Laboratory. It has   not been cleared or approved by the FDA. The laboratory is regulated under CLIA   as qualified to perform high-complexity testing. This test is used for clinical   purposes. It should not be regarded as investigational or for research.       25 OH Vit D Total   Date Value Ref Range Status   02/16/2023 <42 20 - 75 ug/L Final     Comment:     Season, race, dietary intake, and treatment affect the concentration of 25-hydroxy-Vitamin D. Values may decrease during winter months and increase during summer months. Values 20-29 ug/L may indicate Vitamin D insufficiency and values <20 ug/L may indicate Vitamin D deficiency.     No results for input(s): PTHI in the last 168 hours.    Attestation:   I have reviewed today's relevant vital signs, notes, medications, labs and imaging.

## 2023-04-26 NOTE — PLAN OF CARE
DATE & TIME: 4/25/23 9982-3583  Cognitive Concerns/ Orientation : A&O X3; disoriented to situations; Forgetful  BEHAVIOR & AGGRESSION TOOL COLOR: Green  CIWA SCORE: N/A   ABNL VS/O2: BID; VSS on RA ex soft BP (96/55)  MOBILITY: SBA GB/W. Up to bathroom, No BM this shift  PAIN MANAGMENT: denies   DIET: Regular diet, mildly thick liquid (level 2) with meals, good appetitie. Can have thin liquids 30 minutes before & 30 minutes after meals. Takes pills whole with water  BOWEL/BLADDER: Continent of bowel (on lactulose), no BM this shift. pt also on HD  ABNL LAB/BG: n/a   DRAIN/DEVICES: dialysis port. PEG tube-clamped   TELEMETRY RHYTHM: NA  SKIN: Intact; jaundiced; scattered bruises  TESTS/PROCEDURES: HD M/W/F  D/C DATE: Pending   Discharge Barriers: out pt dialysis chair and TCU acceptance-CC following, referrals sent  OTHER IMPORTANT INFO: nephrology and nutrition following. Family and friends w/ pt

## 2023-04-26 NOTE — PROGRESS NOTES
Care Management Follow Up    Length of Stay (days): 95    Expected Discharge Date: 04/28/2023     Concerns to be Addressed: adjustment to diagnosis/illness, care coordination/care conferences, discharge planning     Patient plan of care discussed at interdisciplinary rounds: Yes    Anticipated Discharge Disposition: Home, Home Care  Disposition Comments: No TCU will accept due to high cost of medications, ARU declined due to impulsivity  Anticipated Discharge Services: Other (see comment)  Anticipated Discharge DME: Walker, Raised Toilet Seat    Patient/family educated on Medicare website which has current facility and service quality ratings:  NA  Education Provided on the Discharge Plan: Yes  Patient/Family in Agreement with the Plan: yes    Referrals Placed by CM/SW: Homecare, Post Acute Facilities, Scheduled Follow-up appointments  Private pay costs discussed: Not applicable    Additional Information:  We have reached conclusion that discharge plan will need to focus on patient going home with home care.      Considerable time has been spent on trying to get TCU or ARU placement.  [facility] admissions noted they would like to accept, but with the high cost of a few of his medications that they would need to absorb, they would not be able to accept patient.  CC asked Dwain, Clinical Liaison from  ARU, if he could assess again.   ARU has declined due to patient impulsive at times and he walked >200 feet, so his insurance [facility], would more than likely decline ARU admission.    While working on the above plan, [facility].  This has taken over a week.  Yesterday CC was told they are waiting for [facility] unit to confirm.  Have requested [facility] to send referral to the [facility] if [facility] unit declines.    Have also sent referral to the home care hub.  So far this AM have received four calls from different home care  agencies declining.  Patient will establish primary care at [facility] on 5/2/23.  He will not be able to have home care start until after he initiates primary care.    Patient's Spouse Ofe and his Brother Dylan have been kept updated.  Ofe works today and tomorrow, so timing of discharge would be best on Friday at the earliest for this plan to work and that is pending that we have an outpatient dialysis unit and a home care agency that would be able to service patient.  It would also be helpful if therapies could possibly work on patient's ability to get in and out of a car.  Ofe mentioned that she was placing a cushion in the passenger seat so that he would sit higher.  In discussion on equipment needed, they found an elevated toilet seat.  Ofe felt patient would only need a walker for equipment.  His fluids with meals are still mildly thickened, so they will need a thickening agent.    Will need the Dietician to meet with patient's family also before discharge.      Patient has not been home since his admission for C arrest 11/12/22.  He will require all medications to be filled here.  Hopefully there are no surprises with the cost of his home medications.  Have asked the Discharge Pharmacy Liaison to help with this.  Nursing will need to go over all medications that would need to be held until after dialysis depending on timing of his dialysis run.    CC will continue to follow for safe discharge planning.    Rosalba Yoo, RN   Inpatient Care Management  [phone]

## 2023-04-26 NOTE — PROGRESS NOTES
[facility]    Medicine Progress Note - Hospitalist Service    Date of Admission:  1/21/2023    Assessment & Plan   Blanco Osborne is a 57 year-old male admitted on 1/21/2023 as a transfer from [facility] for evaluation of loculated pleural effusion. He has extensive PMH including h/o liver transplant 2016 due to alcohol abuse, pAF on chronic anticoagulation, history of diabetes mellitus type 2, CVA, hypertension, CHRISTIAN on BiPAP.  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 He was recovering in [facility] and was transferred to [facility] for consideration of chest tube placement and possible intrapleural lysis for worsening effusion    1. Acute metabolic encephalopathy with delirium, multifactorial, -resolving and now more chronic, hepatic encephalopathy  Chronic liver disease, history of liver transplant 2016  ESRD on HD  History of seizure, on Keppra and Vimpat     Continuing to have waxing and waning mentation,  Earlier in hospital stay, remained confused and had pulled out tracheostomy tube, PICC line and pulled his dialysis catheter- replaced.    Mental status continues to fluctuate with periods of alertness and increased sleepiness but without agitation for several days and overall improving.  Off restraints and sitter for several days.    Complicated, prolonged hospital course with mental status concerns multifactorial related to hepatic encephalopathy, end-stage renal disease on dialysis, past PEA arrest, seizure disorder, medication and chronic liver disease with prior liver transplant.     Continue to reorient and redirect as able. Maintain normal day/night cycles and sleep-wake cycles.  Minimize sedating medications as able.  Remains weak and deconditioned with complex,  prolonged hospital course and limited mobility.  Encouragement and support offered daily to patient.    Continue Lactulose to 10 mg BID, monitor for symptoms; goal to have 2-3 bowel movements per day.    Ongoing hemodialysis, as per nephrology, 3X/week dialysis schedule. Nephrology following    Continue ramelteon 8 mg at bedtime; monitor for side effects including AM sedation, reassess daily.    Started on olanzapine 5 mg BID and PRN earlier during this hospitalization. Delirium improved/resolved. Now scheduled HS dose discontinued. Plan to stop the doses with HD as well in upcoming days. Psychiatry recommended  trazodone 25 mg at bedtime, increased to 50 mg. Melatonin as well added.     Continue Keppra and Vimpat. No recurrent seizures reported of recent.     Neurology consult appreciated.  Patient has been started on ropinirole for restless leg syndrome.     Family stating that he is doing well with weaning down on zyprexa  -----currently receiving doses 3x's/week     2. Bilateral pleural effusions   Acute respiratory failure requiring tracheostomy  - resolved    Pneumonia  - resolved   ARDS  - resolved    Right pneumothorax - resolved   *Initial event was parainfluenza and strep pneumo pneumonia back in November 2022. Treated for ARDS. Had failed extubation x2 and tracheostomy performed on previous hospitalization. *Had additional complications of bilateral pneumothoraces as well as hydropneumothorax- pleural fluid grew candida parapsilosis  *Completed 4-week antifungal treatment. While in LTACH he was successfully weaned from the ventilator.  *Recently there were concern for worsening effusion while at [facility] and had thoracentesis 01/13 which returned exudative without growth on cultures. CT chest/abdomen/pelvis on 01/18 demonstrated worsening effusions and concerns for infected parapneumonic effusions with possible SBP.  Multiple pulmonologist at [facility] reviewed CT scan recommended transfer to [facility] for  consideration of chest tube placement and possible intrapleural lysis  *Thoracic surgery (Dr. Jackson) consulted during admission. CT chest small effusions on imaging and so chest tube was not inserted.   * ID and pulmonary as well consulted this stay and have since signed off. No abx.   *Patient pulled out his tracheostomy tube on the night 2/1-2/2 and is tolerating well without increased shortness of breath persistent cough or worsening hypoxia.     Stable on room air now    Encourage incentive spirometry as able, up to chair, deep breathing.    Tracheostomy site has healed now     3.  Possible splenic infract  Wedge shaped area on CT scan chest and CT abdomen on 3/7/23.    Hematology consulted, suspicion for infarct was low.    TTE no thrombus or vegetations.     4. S/p liver transplant 2016   Chronic immunosuppression, on tacrolimus  Cirrhosis with ascites  Subacute bacterial peritonitis (SBP), resolved  *Main manifestations of this are hepatic encephalopathy and ascites.  Hepatologist at [place] felt that most likely reason for the cirrhosis was metabolic syndrome or alcohol intake.  There was no evidence of rejection on biopsy and there was some evidence of regeneration. Paracentesis done on 12/22/2022 showed lactobacillus indicating SBP, treated with Unasyn and Augmentin, finished 2-week course 1/12/2023.  Requires large-volume paracentesis periodically for recurring ascites.    *Paracentesis 1/13/2023 yielded about 7500 cc. Cultures NGTD. paracentesis on 1/24 with 4.8 L fluid removal  Paracentesis on 3/6/23 No SBP    CT abdomen 3/9 small to moderate ascites. Continue intermittent paracentesis as needed if develops symptomatic ascites.    Monitor for signs and symptoms of recurrent spontaneous bacterial peritonitis.    Further treatment for recurrent ascites with TIPS deferred to his Liver Transplant team and/or Hepatology, he had an appointment on 4/21/2023 with Hepatology, Dr. Simms but needs to  "reschedule given ongoing hosptial stay.    Continue Tacrolimus 1 mg BID, rifaximin 550 mg BID.    Continue lactulose as ordered, titrate as needed to have 2-3 BMs    GI consult as needed in hospital for new acute issues, otherwise as outpatient.    Encourgae high protein diet.     5. Goals of care   As per prior rounding provider, \"on 1/25 nursing had mentioned that patient had refused to undergo dialysis and want to stop care, palliative care was consulted.  Patient and his spouse denied wanting to stop care and wanted ongoing restorative care including full code\"    Full Code status.    Needs placement to TCU -> SW working on placement  Vs home if not able to find a TCU,  Per wife     6. Diarrhea, improved, on lactulose for chronic liver disease    C difficile negative on 1/31. Secondary to lactulose. Discontinued rectal tube (2/12) as stools more formed.    Continue lactulose with goal of 2 to 3 soft bowel movement in 24 hours.     7. Paroxysmal atrial fibrillation  Chronic anticoagulation, apixaban  History of embolic strokes  Remains in sinus rhythm, on anticoagulation with apixaban indefinitely for stroke prophylaxis.      Continue apixaban anticoagulation, it was briefly held for fistula placement.    Monitor hemoglobin, see below.     8. Acute anemia, normocytic  Patient has required intermittent transfusions for on-going anemia.  Anemia most likely secondary to critical illness/chronic disease, chronic renal disease.  Baseline hemoglobin around 7-8.  Likely Epo resistance.    Hemoglobin fairly stable now between 8-9    Last hgb 8.9 (4/14/23)    Family would like a vit B12 level drawn  (can do as an add-on)    On daily folate supplements    Monitor intermittently.     9. Hypercalcemia  Work-up shows low/low normal vitamin D, suppressed PTH.  But PTH related peptide is elevated.    Nephrology following and considered prolia, however patient's wife would like to hold off for now.    Holding VIT D and " Phoslo.     Discussed with Dr Moulton regarding elevated PTHrP, suspected due to prolonged immobility. Plan is to interval follow up in a month or so. If trends up, then will plan forther work up.      10. History PEA arrest   PEA arrest prior to his previous admission and 1 episode of PEA associated with desaturation on 12/20.  No structural cardiac disease.      11. Chronic Hypotension  In the past has developed baseline hypotension with cirrhosis and prolonged critical illness.  On hemodialysis.  Receiving midodrine and albumin during dialysis.    Continue midodrine       12. History of seizure   After hypoxic event, in the context of baseline multifactorial encephalopathy secondary to his ongoing critical illness and prior cardiac arrests and prior strokes and cirrhosis with hepatic encephalopathy. MRI of head of 12/20/22 reveals no PRES or leptomeningeal enhancement but scattered.  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   No recent seizure activity.    Continue levetiracetam, lacosamide.    Seizure precautions.    Outpatient follow-up with neurology      13. ESRD  Anemia due to renal disease  Hypotension during dialysis  Hyperphosphatemia    Nephrology reviewing vein mapping to assess options for internal access placement for dialysis, see note 3/16.    Underwent Fistula placement on  3/21/22.    Vascular surgery following-continue to use for tunnel catheter.  Outpatient follow-up with vascular surgery and for will need a follow-up ultrasound of the fistula in 6 weeks to assess patency.    Started sevalmer 800mg TID with meals on 3/27/23.    Complained of cramping with dialysis. Tried a dose of oxycodone prior to dialysis but this was not helpful. Spouse prefers to avoid narcotic given encephalopathy.      Vitamin  B12 level within normal  limits     14. Diabetes mellitus type 2  *Hemoglobin A1c on November 2022 was 4.7  *By report, on Lantus insulin in  the past.  Blood sugar checks and sliding scale insulin previously discontinued as has been stable without insulin needs.    Monitor with periodic blood sugar checks as needed.     15. Hypomagnesemia    Resolved with replacement.     16. Severe malnutrition, protein and calorie type  Moderate dysphagia  G-tube feedings    Continue with tube feeds via PEG tube.    IR replaced the PEG tube on 2/8/2023, monitor.    Nutritionist consulted and following for tube feeds.    SLP following as well. Oral diet as per speech and language therapy (SLP). Patient hopeful to advance to thin liquids soon.    2/20 Nutrition following for tube feeds.    Now undergoing PRN tube feeds.    Encourage high-protein diet.    Nutrition notes reviewed, patient eating 0 to 100% of meals.  Continue current interventions.  As needed tube feed boluses available if patient consumes less than 50% of the meal.    Family encouraging patient to eat high protein diet including protein bars    Family had asked about g tube removal 4/21/23.  Per the EMR, last tube feed bolus was 3/22 if adequately documented. He refuses his tube feeds often per the dietitician. At this point, would favor keeping it in place given overall poor prognosis with ESRD, cirrhosis of transplanted liver, persistent encephalopathy, and inadequate oral intake. Event if he refuses, he is at risk for developing new infection or other decompensation and thus g tube as safety net.  -  This can continue to be re-addressed in f/up if he continues to do well and  There is really no need for it.     17. Anxiety  Fluoxetine was discontinued as per psychiatry recommendation on 2/2 (see consult note for details).    Psychiatry follow-up.     18. Restless leg  Syndrome    Patient started on ropiranole by neurology.    4/26-the patient was seen during hemodialysis this morning.  He is stable.  Continue present care.     Diet: Snacks/Supplements Adult: Other; Gelatein+ with brkfst and lunch, and  magic cup with dinner (RD); With Meals  Combination Diet Regular Diet; Mildly Thick (level 2) (OK for fresh fruit (e.g., pineapple) per SLP)  Adult Formula Bolus Feeding: Novasource Renal; Route: Gastrostomy; 3 times daily; Volume per Bolus: 240; mL(s); Back up bolus for when pt consumes <50% of meals: 80 mL/hr x 3 hrs  Room Service    DVT Prophylaxis: Pneumatic Compression Devices  Nixon Catheter: Not present  Lines: PRESENT      CVC Double Lumen Right External jugular Tunneled-Site Assessment: WDL  Hemodialysis Vascular Access Arteriovenous fistula Left Forearm-Site Assessment: WDL;Bruit present;Thrill present      Cardiac Monitoring: None  Code Status: Full Code      Clinically Significant Risk Factors              # Hypoalbuminemia: Lowest albumin = 1.9 g/dL at 1/23/2023  6:38 AM, will monitor as appropriate            # Severe Malnutrition: based on nutrition assessment        Disposition Plan     Expected Discharge Date: 04/28/2023, 12:00 PM  Discharge Delays: Complex Discharge  Placement - TCU  Other (Add Comment)  Destination: inpatient rehabilitation facility  Discharge Comments: Dialysis pt MWF. Will need placement TCU/LTC. [facility] willing to accept once no sitter, SW to follow up when sitter free          Jack Felix MD  Hospitalist Service  [facility]  Securely message with [employer] (more info)  Text page via [employer] Paging/Directory   ______________________________________________________________________    Interval History   No new issues or complaints     Physical Exam   Vital Signs: Temp: 97.9  F (36.6  C) Temp src: Oral BP: 115/67 Pulse: 69   Resp: 22 SpO2: 95 % O2 Device: None (Room air)    Weight: 181 lbs 7.02 oz  Awake alert and conversant  Moving all extremities equally     Medical Decision Making       MANAGEMENT DISCUSSED with the following over the past 24 hours: patient and CC  NOTE(S)/MEDICAL RECORDS REVIEWED over the past 24 hours: EPIC      Data          Imaging results reviewed over the past 24 hrs:   No results found for this or any previous visit (from the past 24 hour(s)).

## 2023-04-26 NOTE — PLAN OF CARE
Goal Outcome Evaluation:         SUMMARY: Acute metabolic encephalopathy with delirium, ESRD on HD  DATE & TIME: 4/25-04/26 9377-2222  Cognitive Concerns/ Orientation : A&Ox3; disoriented to situation; Forgetful  BEHAVIOR & AGGRESSION TOOL COLOR: Green  CIWA SCORE: N/A   ABNL VS/O2: BID; VSS on RA   MOBILITY: SBA GB/W. Up to bathroom, loose BM x1.   PAIN MANAGMENT: denies   DIET: Regular diet, mildly thick liquid (level 2) with meals, good appetitie. Can have thin liquids 30 minutes before & 30 minutes after meals. Takes pills whole with water  BOWEL/BLADDER: Continent of bowel (on lactulose), Pt also on HD  ABNL LAB/BG: n/a   DRAIN/DEVICES: dialysis port. PEG tube-clamped   TELEMETRY RHYTHM: NA  SKIN: Intact; jaundiced; scattered bruises  TESTS/PROCEDURES: HD M/W/F  D/C DATE: Pending   Discharge Barriers: out pt dialysis chair and TCU acceptance-CC following, referrals sent  OTHER IMPORTANT INFO: nephrology and nutrition following. Breakfast to be delivered early on HD days as well as give tylenol and midodrine around the same time. Pt restless at times during the night.

## 2023-04-26 NOTE — PLAN OF CARE
Goal Outcome Evaluation:      SUMMARY: Acute metabolic encephalopathy with delirium, ESRD on HD  DATE & TIME: 04/26 5636-3408  Cognitive Concerns/ Orientation : A&Ox3; disoriented to situation; Forgetful  BEHAVIOR & AGGRESSION TOOL COLOR: Green  CIWA SCORE: N/A   ABNL VS/O2: BID; VSS on RA   MOBILITY: SBA GB/W. Up to bathroom,    PAIN MANAGMENT: 7/10 back pain. Tylenol given effective.   DIET: Regular diet, mildly thick liquid (level 2) with meals, good appetitie. Can have thin liquids 30 minutes before & 30 minutes after meals. Takes pills whole with water  BOWEL/BLADDER: Continent of bowel (on lactulose), Pt also on HD  ABNL LAB/BG: n/a   DRAIN/DEVICES: dialysis port. PEG tube-clamped   TELEMETRY RHYTHM: Co2 30, K 3.8.  SKIN: Intact; jaundiced; scattered bruises  TESTS/PROCEDURES: HD M/W/F  D/C DATE: Pending   Discharge Barriers: out pt dialysis chair and TCU acceptance-CC following, referrals sent  OTHER IMPORTANT INFO: nephrology and nutrition following. Breakfast to be delivered early on HD days as well as give tylenol and midodrine around the same time. Pt restless at times.

## 2023-04-26 NOTE — PROGRESS NOTES
HEMODIALYSIS TREATMENT NOTE    Date: 4/26/2023  Time: 11:29 AM    Data:    Ultrafiltration - Post Run Net Total Removed (mL): 2000 mL  Vascular Access Status: patent  Dialyzer Rinse: Light, Streaked  Total Blood Volume Processed: 66.8 Liters  Total Dialysis (Treatment) Time: 3 Hours    Lab:   No    Interventions:  3K 2.25Ca bath, 400 BFR, 500 DFR, Retacrit    Assessment:  Pt completed a 3 hr HD tx with 2L net fluid removed. Pt intermittently anxious and confused about time and situation, improved with redirection. Pt tolerated tx well, NAD noted. CVC patent, caps and clamps in place, dsg c/d/i, no s/s infection or bleeding noted. Tx report given to primary nurse.      Plan:    Per nephrology team

## 2023-04-27 ENCOUNTER — APPOINTMENT (OUTPATIENT)
Dept: PHYSICAL THERAPY | Facility: CLINIC | Age: 58
DRG: 981 | End: 2023-04-27
Attending: STUDENT IN AN ORGANIZED HEALTH CARE EDUCATION/TRAINING PROGRAM
Payer: COMMERCIAL

## 2023-04-27 ENCOUNTER — APPOINTMENT (OUTPATIENT)
Dept: SPEECH THERAPY | Facility: CLINIC | Age: 58
DRG: 981 | End: 2023-04-27
Attending: STUDENT IN AN ORGANIZED HEALTH CARE EDUCATION/TRAINING PROGRAM
Payer: COMMERCIAL

## 2023-04-27 ENCOUNTER — APPOINTMENT (OUTPATIENT)
Dept: OCCUPATIONAL THERAPY | Facility: CLINIC | Age: 58
DRG: 981 | End: 2023-04-27
Attending: STUDENT IN AN ORGANIZED HEALTH CARE EDUCATION/TRAINING PROGRAM
Payer: COMMERCIAL

## 2023-04-27 ENCOUNTER — APPOINTMENT (OUTPATIENT)
Dept: GENERAL RADIOLOGY | Facility: CLINIC | Age: 58
DRG: 981 | End: 2023-04-27
Attending: HOSPITALIST
Payer: COMMERCIAL

## 2023-04-27 LAB
ALBUMIN SERPL BCG-MCNC: 3.7 G/DL (ref 3.5–5.2)
ANION GAP SERPL CALCULATED.3IONS-SCNC: 14 MMOL/L (ref 7–15)
BUN SERPL-MCNC: 48.5 MG/DL (ref 6–20)
CALCIUM SERPL-MCNC: 9.9 MG/DL (ref 8.6–10)
CHLORIDE SERPL-SCNC: 101 MMOL/L (ref 98–107)
CREAT SERPL-MCNC: 4.9 MG/DL (ref 0.67–1.17)
DEPRECATED HCO3 PLAS-SCNC: 26 MMOL/L (ref 22–29)
GFR SERPL CREATININE-BSD FRML MDRD: 13 ML/MIN/1.73M2
GLUCOSE SERPL-MCNC: 115 MG/DL (ref 70–99)
PHOSPHATE SERPL-MCNC: 4.9 MG/DL (ref 2.5–4.5)
POTASSIUM SERPL-SCNC: 3.9 MMOL/L (ref 3.4–5.3)
SODIUM SERPL-SCNC: 141 MMOL/L (ref 136–145)

## 2023-04-27 PROCEDURE — 82947 ASSAY GLUCOSE BLOOD QUANT: CPT | Performed by: STUDENT IN AN ORGANIZED HEALTH CARE EDUCATION/TRAINING PROGRAM

## 2023-04-27 PROCEDURE — 97110 THERAPEUTIC EXERCISES: CPT | Mod: GO

## 2023-04-27 PROCEDURE — 97535 SELF CARE MNGMENT TRAINING: CPT | Mod: GO

## 2023-04-27 PROCEDURE — 82310 ASSAY OF CALCIUM: CPT | Performed by: STUDENT IN AN ORGANIZED HEALTH CARE EDUCATION/TRAINING PROGRAM

## 2023-04-27 PROCEDURE — 97116 GAIT TRAINING THERAPY: CPT | Mod: GP

## 2023-04-27 PROCEDURE — 92526 ORAL FUNCTION THERAPY: CPT | Mod: GN

## 2023-04-27 PROCEDURE — 250N000013 HC RX MED GY IP 250 OP 250 PS 637: Performed by: STUDENT IN AN ORGANIZED HEALTH CARE EDUCATION/TRAINING PROGRAM

## 2023-04-27 PROCEDURE — 250N000013 HC RX MED GY IP 250 OP 250 PS 637: Performed by: HOSPITALIST

## 2023-04-27 PROCEDURE — 97110 THERAPEUTIC EXERCISES: CPT | Mod: GP

## 2023-04-27 PROCEDURE — 97530 THERAPEUTIC ACTIVITIES: CPT | Mod: GP

## 2023-04-27 PROCEDURE — 74230 X-RAY XM SWLNG FUNCJ C+: CPT

## 2023-04-27 PROCEDURE — 250N000012 HC RX MED GY IP 250 OP 636 PS 637: Performed by: STUDENT IN AN ORGANIZED HEALTH CARE EDUCATION/TRAINING PROGRAM

## 2023-04-27 PROCEDURE — 120N000001 HC R&B MED SURG/OB

## 2023-04-27 PROCEDURE — 92611 MOTION FLUOROSCOPY/SWALLOW: CPT | Mod: GN

## 2023-04-27 PROCEDURE — 250N000013 HC RX MED GY IP 250 OP 250 PS 637: Performed by: PSYCHIATRY & NEUROLOGY

## 2023-04-27 PROCEDURE — 36415 COLL VENOUS BLD VENIPUNCTURE: CPT | Performed by: STUDENT IN AN ORGANIZED HEALTH CARE EDUCATION/TRAINING PROGRAM

## 2023-04-27 PROCEDURE — 99231 SBSQ HOSP IP/OBS SF/LOW 25: CPT | Performed by: HOSPITALIST

## 2023-04-27 RX ORDER — BARIUM SULFATE 400 MG/ML
SUSPENSION ORAL ONCE
Status: COMPLETED | OUTPATIENT
Start: 2023-04-27 | End: 2023-04-27

## 2023-04-27 RX ADMIN — FOLIC ACID 1 MG: 1 TABLET ORAL at 08:02

## 2023-04-27 RX ADMIN — SEVELAMER CARBONATE 800 MG: 800 TABLET, FILM COATED ORAL at 17:32

## 2023-04-27 RX ADMIN — SEVELAMER CARBONATE 800 MG: 800 TABLET, FILM COATED ORAL at 12:09

## 2023-04-27 RX ADMIN — Medication 1 CAPSULE: at 08:02

## 2023-04-27 RX ADMIN — MIDODRINE HYDROCHLORIDE 10 MG: 5 TABLET ORAL at 12:08

## 2023-04-27 RX ADMIN — SEVELAMER CARBONATE 800 MG: 800 TABLET, FILM COATED ORAL at 08:03

## 2023-04-27 RX ADMIN — PANTOPRAZOLE SODIUM 40 MG: 40 TABLET, DELAYED RELEASE ORAL at 08:02

## 2023-04-27 RX ADMIN — APIXABAN 5 MG: 5 TABLET, FILM COATED ORAL at 08:03

## 2023-04-27 RX ADMIN — ROPINIROLE HYDROCHLORIDE 0.5 MG: 0.25 TABLET, FILM COATED ORAL at 20:10

## 2023-04-27 RX ADMIN — ACETAMINOPHEN 650 MG: 325 TABLET, FILM COATED ORAL at 16:38

## 2023-04-27 RX ADMIN — LACTULOSE 10 G: 10 SOLUTION ORAL at 20:11

## 2023-04-27 RX ADMIN — LEVETIRACETAM 1000 MG: 500 TABLET, FILM COATED ORAL at 20:11

## 2023-04-27 RX ADMIN — RIFAXIMIN 550 MG: 550 TABLET ORAL at 20:00

## 2023-04-27 RX ADMIN — LACOSAMIDE 100 MG: 100 TABLET, FILM COATED ORAL at 20:11

## 2023-04-27 RX ADMIN — TACROLIMUS 1 MG: 1 CAPSULE ORAL at 08:02

## 2023-04-27 RX ADMIN — APIXABAN 5 MG: 5 TABLET, FILM COATED ORAL at 20:00

## 2023-04-27 RX ADMIN — LACTULOSE 10 G: 10 SOLUTION ORAL at 08:03

## 2023-04-27 RX ADMIN — MIDODRINE HYDROCHLORIDE 10 MG: 5 TABLET ORAL at 08:02

## 2023-04-27 RX ADMIN — MIDODRINE HYDROCHLORIDE 10 MG: 5 TABLET ORAL at 17:32

## 2023-04-27 RX ADMIN — RIFAXIMIN 550 MG: 550 TABLET ORAL at 08:03

## 2023-04-27 RX ADMIN — LACOSAMIDE 100 MG: 100 TABLET, FILM COATED ORAL at 08:03

## 2023-04-27 RX ADMIN — BARIUM SULFATE 15 ML: 400 SUSPENSION ORAL at 10:53

## 2023-04-27 RX ADMIN — RAMELTEON 8 MG: 8 TABLET, FILM COATED ORAL at 20:00

## 2023-04-27 RX ADMIN — MELATONIN TAB 3 MG 3 MG: 3 TAB at 20:11

## 2023-04-27 RX ADMIN — TACROLIMUS 1 MG: 1 CAPSULE ORAL at 20:00

## 2023-04-27 ASSESSMENT — ACTIVITIES OF DAILY LIVING (ADL)
ADLS_ACUITY_SCORE: 48
ADLS_ACUITY_SCORE: 52
ADLS_ACUITY_SCORE: 48

## 2023-04-27 NOTE — CONSULTS
IR consult request (and discussed with Veena RN, charge on 66) for a g tube removal. G tube was placed on 2/8/23 and has been over 6 weeks then. Per the patient and the RN the g tube has not been used for a month or so.     After explaining the procedure and answering questions the g tube was removed intact without pain or complications. Gauze and tape pressure dressing placed over the site.     Dressing cares will be entered.     Orders for NPO as best as can be done also entered.     Total time: 15 minutes     Thanks, Nina [facility] Interventional Radiology CNP ([phone]) (phone [phone])

## 2023-04-27 NOTE — PLAN OF CARE
SUMMARY: Acute metabolic encephalopathy with delirium, ESRD on HD  DATE & TIME: 04/26-4/27/23 Night shift  Cognitive Concerns/ Orientation : A&Ox3; disoriented to situation; Forgetful  BEHAVIOR & AGGRESSION TOOL COLOR: Green  CIWA SCORE: N/A   ABNL VS/O2: BID; VSS on RA   MOBILITY: SBA GB/W. Up to bathroom x3 during night  PAIN MANAGMENT: Denied  DIET: Regular diet, mildly thick liquid (level 2). Free water protocol: Thin water b/w meals (30 mins before or after)- to have video swallow exam today  BOWEL/BLADDER: Continent of bowel (on lactulose), Pt also on HD  ABNL LAB/BG: n/a   DRAIN/DEVICES: dialysis catheter; PEG tube-clamped, site open to air  TELEMETRY RHYTHM: NA  SKIN: Intact; jaundiced; scattered bruises  TESTS/PROCEDURES: HD M/W/F. Dialysis done yesterday; XR Video swallow ordered for today  D/C DATE: discharge to TCU Pending   Discharge Barriers: out pt dialysis chair and HC acceptance; CC following, referrals sent  OTHER IMPORTANT INFO: nephrology and nutrition following. Bedtime Trazodone held per family request. Per family report, Trazodone makes patient too sleepy.        Goal Outcome Evaluation:

## 2023-04-27 NOTE — PROGRESS NOTES
Care Management Follow Up    Length of Stay (days): 96    Expected Discharge Date: 04/28/2023     Concerns to be Addressed: adjustment to diagnosis/illness, care coordination/care conferences, discharge planning     Patient plan of care discussed at interdisciplinary rounds: Yes    Anticipated Discharge Disposition: Home, Home Care  Disposition Comments: No TCU will accept due to high cost of medications, ARU declined due to impulsivity  Anticipated Discharge Services: Other (see comment)  Anticipated Discharge DME: Walker, Raised Toilet Seat     Patient/family educated on Medicare website which has current facility and service quality ratings:  yes  Education Provided on the Discharge Plan: Yes  Patient/Family in Agreement with the Plan: yes    Referrals Placed by CM/SW: Homecare, Post Acute Facilities, Scheduled Follow-up appointments  Private pay costs discussed: Not applicable    Additional Information:  Planning discharge home tomorrow after his dialysis run.  Therapies were all still recommending TCU, so patient is a very high readmission risk.  There has been much time spent on trying to have him transition to a TCU or ARU but this has proved to not be successful.  His family is very supportive and ready to have him come home.  His spouse works two days per week.  On those days, his nephew will be staying with him.  His Brother Dylan works from home and has really been very supportive.    We finally have confirmed dialysis chair time at [facility] M/W/F at 7:00 AM.    [facility] has accepted for RN/PT/OT/ST and HHA.  Unfortunately, patient needs to initiate primary care before the home care can start.  His PCP appointment is on 5/2/23.    Patient has not been home since his admission for C arrest 11/12/22.  He will require all medications to be filled here.  Hopefully there are no surprises with the cost of his home medications.  Have asked the Discharge Pharmacy Liaison to help with  this.    Nursing will need to go over all medications that would need to be held until after dialysis.    The Dietician has left education at bedside and they will try to meet with patient's spouse tomorrow if possible.    It is his spouse's birthday today.  Hopefully she has time to celebrate, as after today she will be very busy in caring for patient.    Care Management will continue to follow for safe discharge planning.    Rosalba Yoo, RN   Inpatient Care Management  [phone]

## 2023-04-27 NOTE — PLAN OF CARE
Goal Outcome Evaluation:                      SUMMARY: Acute metabolic encephalopathy with delirium, ESRD on HD  DATE & TIME: 04/26, Pm shift  Cognitive Concerns/ Orientation : A&Ox3; disoriented to situation; Forgetful  BEHAVIOR & AGGRESSION TOOL COLOR: Green  CIWA SCORE: N/A   ABNL VS/O2: BID; VSS on RA   MOBILITY: SBA GB/W. Up to bathroom. Family ambulated patient x 1 this shift. Patient refused more  ambulation, patient reported that he is tired and will like to sleep.  PAIN MANAGMENT: PRN Tylenol for abdominal pain.  DIET: Regular diet, mildly thick liquid (level 2). Free water protocol: Thin water b/w meals (30 mins before or after)  BOWEL/BLADDER: Continent of bowel (on lactulose), Pt also on HD  ABNL LAB/BG: n/a   DRAIN/DEVICES: dialysis port. PEG tube-clamped, site open to air. Patient refused dressing to be applied.   TELEMETRY RHYTHM: Co2 30, Urea 29.4, Cr 3.58  SKIN: Intact; jaundiced; scattered bruises  TESTS/PROCEDURES: HD M/W/F. Dialysis done today. XR Video swallow ordered.  D/C DATE: discharge to TCU Pending   Discharge Barriers: out pt dialysis chair and TCU acceptance-CC following, referrals sent  OTHER IMPORTANT INFO: nephrology and nutrition following. Bedtime Trazodone held per family request. Per family report, Trazodone makes patient too sleepy.

## 2023-04-27 NOTE — CONSULTS
"CLINICAL NUTRITION SERVICES BRIEF NOTE  Refer to RD note dated 4/25 for full detailed reassessment.     Consulted for \"Discuss dialysis diet with patient and family\"     New Findings  - Pt seen in room. No family available. Per staff, not appropriate to complete education w/o family present as pt is forgetful.     Intervention  - D/w CC, family will be present tomorrow before discharge. RD team will attempt introductory ed session as schedule allows.     - Handouts left in room:     Low sodium foods and drinks    Foods and drinks low in potassium and phosphorous     Managing your potassium intake    Managing your phosphorous intake    Stage 5 CKD nutrition therapy for people on dialysis    - The dietitian at [facility] will be the best resource for diet going forward. Patient should still be encouraged to consume adequate kcal/protein.      Addendum: discontinued TF and fluid flush orders as pt's GT was removed today!     Thank you!    Edna Osuna RD,   [facility], [facility], Ortho, Ortho Spine  Pager: [phone]  Weekend Pager: [phone]      "

## 2023-04-27 NOTE — PROGRESS NOTES
[facility]    Medicine Progress Note - Hospitalist Service    Date of Admission:  1/21/2023    Assessment & Plan   Blanco Osborne is a 57 year-old male admitted on 1/21/2023 as a transfer from [facility] for evaluation of loculated pleural effusion. He has extensive PMH including h/o liver transplant 2016 due to alcohol abuse, pAF on chronic anticoagulation, history of diabetes mellitus type 2, CVA, hypertension, CHRISTIAN on BiPAP.  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 He was recovering in [facility] and was transferred to [facility] for consideration of chest tube placement and possible intrapleural lysis for worsening effusion    1. Acute metabolic encephalopathy with delirium, multifactorial, -resolving and now more chronic, hepatic encephalopathy  Chronic liver disease, history of liver transplant 2016  ESRD on HD  History of seizure, on Keppra and Vimpat     Continuing to have waxing and waning mentation,  Earlier in hospital stay, remained confused and had pulled out tracheostomy tube, PICC line and pulled his dialysis catheter- replaced.    Mental status continues to fluctuate with periods of alertness and increased sleepiness but without agitation for several days and overall improving.  Off restraints and sitter for several days.    Complicated, prolonged hospital course with mental status concerns multifactorial related to hepatic encephalopathy, end-stage renal disease on dialysis, past PEA arrest, seizure disorder, medication and chronic liver disease with prior liver transplant.     Continue to reorient and redirect as able. Maintain normal day/night cycles and sleep-wake cycles.  Minimize sedating medications as able.  Remains weak and deconditioned with complex,  prolonged hospital course and limited mobility.  Encouragement and support offered daily to patient.    Continue Lactulose to 10 mg BID, monitor for symptoms; goal to have 2-3 bowel movements per day.    Ongoing hemodialysis, as per nephrology, 3X/week dialysis schedule. Nephrology following    Continue ramelteon 8 mg at bedtime; monitor for side effects including AM sedation, reassess daily.    Started on olanzapine 5 mg BID and PRN earlier during this hospitalization. Delirium improved/resolved. Now scheduled HS dose discontinued. Plan to stop the doses with HD as well in upcoming days. Psychiatry recommended  trazodone 25 mg at bedtime, increased to 50 mg. Melatonin as well added.     Continue Keppra and Vimpat. No recurrent seizures reported of recent.     Neurology consult appreciated.  Patient has been started on ropinirole for restless leg syndrome.     Family stating that he is doing well with weaning down on zyprexa  -----currently receiving doses 3x's/week     2. Bilateral pleural effusions   Acute respiratory failure requiring tracheostomy  - resolved    Pneumonia  - resolved   ARDS  - resolved    Right pneumothorax - resolved   *Initial event was parainfluenza and strep pneumo pneumonia back in November 2022. Treated for ARDS. Had failed extubation x2 and tracheostomy performed on previous hospitalization. *Had additional complications of bilateral pneumothoraces as well as hydropneumothorax- pleural fluid grew candida parapsilosis  *Completed 4-week antifungal treatment. While in LTACH he was successfully weaned from the ventilator.  *Recently there were concern for worsening effusion while at [facility] and had thoracentesis 01/13 which returned exudative without growth on cultures. CT chest/abdomen/pelvis on 01/18 demonstrated worsening effusions and concerns for infected parapneumonic effusions with possible SBP.  Multiple pulmonologist at [facility] reviewed CT scan recommended transfer to [facility] for  consideration of chest tube placement and possible intrapleural lysis  *Thoracic surgery (Dr. Jackson) consulted during admission. CT chest small effusions on imaging and so chest tube was not inserted.   * ID and pulmonary as well consulted this stay and have since signed off. No abx.   *Patient pulled out his tracheostomy tube on the night 2/1-2/2 and is tolerating well without increased shortness of breath persistent cough or worsening hypoxia.     Stable on room air now    Encourage incentive spirometry as able, up to chair, deep breathing.    Tracheostomy site has healed now     3.  Possible splenic infract  Wedge shaped area on CT scan chest and CT abdomen on 3/7/23.    Hematology consulted, suspicion for infarct was low.    TTE no thrombus or vegetations.     4. S/p liver transplant 2016   Chronic immunosuppression, on tacrolimus  Cirrhosis with ascites  Subacute bacterial peritonitis (SBP), resolved  *Main manifestations of this are hepatic encephalopathy and ascites.  Hepatologist at [facility] felt that most likely reason for the cirrhosis was metabolic syndrome or alcohol intake.  There was no evidence of rejection on biopsy and there was some evidence of regeneration. Paracentesis done on 12/22/2022 showed lactobacillus indicating SBP, treated with Unasyn and Augmentin, finished 2-week course 1/12/2023.  Requires large-volume paracentesis periodically for recurring ascites.    *Paracentesis 1/13/2023 yielded about 7500 cc. Cultures NGTD. paracentesis on 1/24 with 4.8 L fluid removal  Paracentesis on 3/6/23 No SBP    CT abdomen 3/9 small to moderate ascites. Continue intermittent paracentesis as needed if develops symptomatic ascites.    Monitor for signs and symptoms of recurrent spontaneous bacterial peritonitis.    Further treatment for recurrent ascites with TIPS deferred to his Liver Transplant team and/or Hepatology, he had an appointment on 4/21/2023 with Hepatology, Dr. Simms but needs to  "reschedule given ongoing hosptial stay.    Continue Tacrolimus 1 mg BID, rifaximin 550 mg BID.    Continue lactulose as ordered, titrate as needed to have 2-3 BMs    GI consult as needed in hospital for new acute issues, otherwise as outpatient.    Encourgae high protein diet.     5. Goals of care   As per prior rounding provider, \"on 1/25 nursing had mentioned that patient had refused to undergo dialysis and want to stop care, palliative care was consulted.  Patient and his spouse denied wanting to stop care and wanted ongoing restorative care including full code\"    Full Code status.    Needs placement to TCU -> SW working on placement  Vs home if not able to find a TCU,  Per wife     6. Diarrhea, improved, on lactulose for chronic liver disease    C difficile negative on 1/31. Secondary to lactulose. Discontinued rectal tube (2/12) as stools more formed.    Continue lactulose with goal of 2 to 3 soft bowel movement in 24 hours.     7. Paroxysmal atrial fibrillation  Chronic anticoagulation, apixaban  History of embolic strokes  Remains in sinus rhythm, on anticoagulation with apixaban indefinitely for stroke prophylaxis.      Continue apixaban anticoagulation, it was briefly held for fistula placement.    Monitor hemoglobin, see below.     8. Acute anemia, normocytic  Patient has required intermittent transfusions for on-going anemia.  Anemia most likely secondary to critical illness/chronic disease, chronic renal disease.  Baseline hemoglobin around 7-8.  Likely Epo resistance.    Hemoglobin fairly stable now between 8-9    Last hgb 8.9 (4/14/23)    Family would like a vit B12 level drawn  (can do as an add-on)    On daily folate supplements    Monitor intermittently.     9. Hypercalcemia  Work-up shows low/low normal vitamin D, suppressed PTH.  But PTH related peptide is elevated.    Nephrology following and considered prolia, however patient's wife would like to hold off for now.    Holding VIT D and " Phoslo.     Discussed with Dr Moulton regarding elevated PTHrP, suspected due to prolonged immobility. Plan is to interval follow up in a month or so. If trends up, then will plan forther work up.      10. History PEA arrest   PEA arrest prior to his previous admission and 1 episode of PEA associated with desaturation on 12/20.  No structural cardiac disease.      11. Chronic Hypotension  In the past has developed baseline hypotension with cirrhosis and prolonged critical illness.  On hemodialysis.  Receiving midodrine and albumin during dialysis.    Continue midodrine       12. History of seizure   After hypoxic event, in the context of baseline multifactorial encephalopathy secondary to his ongoing critical illness and prior cardiac arrests and prior strokes and cirrhosis with hepatic encephalopathy. MRI of head of 12/20/22 reveals no PRES or leptomeningeal enhancement but scattered.  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   No recent seizure activity.    Continue levetiracetam, lacosamide.    Seizure precautions.    Outpatient follow-up with neurology      13. ESRD  Anemia due to renal disease  Hypotension during dialysis  Hyperphosphatemia    Nephrology reviewing vein mapping to assess options for internal access placement for dialysis, see note 3/16.    Underwent Fistula placement on  3/21/22.    Vascular surgery following-continue to use for tunnel catheter.  Outpatient follow-up with vascular surgery and for will need a follow-up ultrasound of the fistula in 6 weeks to assess patency.    Started sevalmer 800mg TID with meals on 3/27/23.    Complained of cramping with dialysis. Tried a dose of oxycodone prior to dialysis but this was not helpful. Spouse prefers to avoid narcotic given encephalopathy.      Vitamin  B12 level within normal  limits     14. Diabetes mellitus type 2  *Hemoglobin A1c on November 2022 was 4.7  *By report, on Lantus insulin in  the past.  Blood sugar checks and sliding scale insulin previously discontinued as has been stable without insulin needs.    Monitor with periodic blood sugar checks as needed.     15. Hypomagnesemia    Resolved with replacement.     16. Severe malnutrition, protein and calorie type  Moderate dysphagia  G-tube feedings    Continue with tube feeds via PEG tube.    IR replaced the PEG tube on 2/8/2023, monitor.    Nutritionist consulted and following for tube feeds.    SLP following as well. Oral diet as per speech and language therapy (SLP). Patient hopeful to advance to thin liquids soon.    2/20 Nutrition following for tube feeds.    Now undergoing PRN tube feeds.    Encourage high-protein diet.    Nutrition notes reviewed, patient eating 0 to 100% of meals.  Continue current interventions.  As needed tube feed boluses available if patient consumes less than 50% of the meal.    Family encouraging patient to eat high protein diet including protein bars    Will ask interventional radiology to remove PEG     17. Anxiety  Fluoxetine was discontinued as per psychiatry recommendation on 2/2 (see consult note for details).    Psychiatry follow-up.     18. Restless leg  Syndrome    Patient started on ropiranole by neurology.    4/26-the patient was seen during hemodialysis this morning.  He is stable.  Continue present care.     Diet: Snacks/Supplements Adult: Other; Gelatein+ with brkfst and lunch, and magic cup with dinner (RD); With Meals  Room Service  NPO for Medical/Clinical Reasons Except for: Other; Specify: NPO For oral intake as best as can be done for 6 hours post removal of Gastrostomy G/GJ tube.    DVT Prophylaxis: Pneumatic Compression Devices  Nixon Catheter: Not present  Lines: PRESENT      CVC Double Lumen Right External jugular Tunneled-Site Assessment: WDL  Hemodialysis Vascular Access Arteriovenous fistula Left Forearm-Site Assessment: WDL;Bruit present;Thrill present      Cardiac Monitoring: None  Code  Status: Full Code      Clinically Significant Risk Factors              # Hypoalbuminemia: Lowest albumin = 1.9 g/dL at 1/23/2023  6:38 AM, will monitor as appropriate            # Severe Malnutrition: based on nutrition assessment        Disposition Plan      Expected Discharge Date: 04/28/2023,  3:00 PM  Discharge Delays: Complex Discharge  Placement - TCU  Other (Add Comment)  Destination: inpatient rehabilitation facility  Discharge Comments: Dialysis pt MWF. Will need placement TCU/LTC. [facility] willing to accept once no sitter, SW to follow up when sitter free          Jack Felix MD  Hospitalist Service  [facility]  Securely message with kingsky (more info)  Text page via [employer] Paging/Directory   ______________________________________________________________________    Interval History   No new issues or complaints     Physical Exam   Vital Signs: Temp: 97  F (36.1  C) Temp src: Oral BP: 96/62 Pulse: 72   Resp: 16 SpO2: 96 % O2 Device: None (Room air)    Weight: 181 lbs 7.02 oz  Awake alert and conversant but not conversant   Moving all extremities equally     Medical Decision Making       MANAGEMENT DISCUSSED with the following over the past 24 hours: patient and CC  NOTE(S)/MEDICAL RECORDS REVIEWED over the past 24 hours: EPIC      Data     I have personally reviewed the following data over the past 24 hrs:    N/A  \   N/A   / N/A     141 101 48.5 (H) /  115 (H)   3.9 26 4.90 (H) \       ALT: N/A AST: N/A AP: N/A TBILI: N/A   ALB: 3.7 TOT PROTEIN: N/A LIPASE: N/A       Imaging results reviewed over the past 24 hrs:   Recent Results (from the past 24 hour(s))   XR Video Swallow with SLP or OT    Narrative    VIDEO SWALLOWING EVALUATION   4/27/2023 10:48 AM     HISTORY: Repeat study due to silent aspiration.    COMPARISON: 3/17/2023.    FLUOROSCOPY TIME: 0.5 minutes.  SPOT IMAGES OR CINE RUNS: 7      Impression    IMPRESSION:    Thin: Aspiration with cough. Aspiration  appeared to continue with chin  tuck maneuver.    Mildly thick: No penetration or aspiration.    This study only includes the cervical esophagus.      ERASMO FOREMAN MD         SYSTEM ID:  [id number]

## 2023-04-27 NOTE — PLAN OF CARE
SUMMARY: Acute metabolic encephalopathy with delirium, ESRD on HD  DATE & TIME: 4/27/23 3281-4878  Cognitive Concerns/ Orientation : A&Ox3; disoriented to situation; Forgetful  BEHAVIOR & AGGRESSION TOOL COLOR: Green  CIWA SCORE: N/A   ABNL VS/O2: BID; VSS on RA  MOBILITY: SBA GB/W  PAIN MANAGMENT: PRN tylenol x1 effective for generalized and shoulder pain  DIET: Renal diet, mildly thick liquid (level 2). Free water protocol: Thin water b/w meals (30 mins before or after)  BOWEL/BLADDER: Continent of bowel (on lactulose), Pt also on HD  ABNL LAB/BG: Cr 4.90  DRAIN/DEVICES: dialysis catheter  TELEMETRY RHYTHM: NA  SKIN: Intact; jaundiced; scattered bruises. Old PEG tube site-CDI  TESTS/PROCEDURES: HD M/W/F. XR Video swallow today. PEG tubed removed by IR today.   D/C DATE: home tomorrow with out pt dialysis and HC after primary care visit 5/2  Discharge Barriers: Pharmacy medication- discharge pharmacy liaison consulted   OTHER IMPORTANT INFO: nephrology/nutrition/SLP/PT/OT/CC following. Nutrition services to stop by tomorrow with family for education. Bedtime Trazodone being held per family request. Infrequent cough.

## 2023-04-27 NOTE — PROGRESS NOTES
Speech-Language Pathology: Video Swallow Study     04/27/23 1200   Appointment Info   Signing Clinician's Name / Credentials (SLP) Julee Luther MA, CCC-SLP   General Information   Onset of Illness/Injury or Date of Surgery 12/16/22   Patient/Family Therapy Goal Statement (SLP) To get back to thin liquids/ off thickened liquids   Pertinent History of Current Problem See prior SLP notes and Hospitalist's notes   General Observations Alert and cooperative   Type of Evaluation   Type of Evaluation Swallow Evaluation   General Swallowing Observations   Current Diet/Method of Nutritional Intake (General Swallowing Observations, NIS) regular diet;mildly thick liquids (level 2)  (With water protocol)   Respiratory Support (General Swallowing Observations) none   Past History of Dysphagia Significant hx of dysphagia and silent aspiration with thin liquids and multiple VFSS. His last was on 3/17/23. He has been tolerating Regular diet and mildly thick liquids with water protocol since 3/2/23.   Swallowing Evaluation Videofluoroscopic swallow study (VFSS)   VFSS Evaluation   Views Taken left lateral   Physical Location of Procedure [facility]   VFSS Textures Trialed thin liquids;mildly thick liquids   VFSS Eval: Thin Liquid Texture Trial   Mode of Presentation, Thin Liquid cup;spoon;self-fed   Order of Presentation 3,4,5,6,7   Preparatory Phase WFL   Oral Phase, Thin Liquid premature pharyngeal entry   Bolus Location When Swallow Triggered pyriforms   Pharyngeal Phase, Thin Liquid impaired hyolaryngeal excursion;impaired epiglottic movement;residue in pyriform sinus;impaired pharyngoesophageal segment opening   Rosenbek's Penetration Aspiration Scale: Thin Liquid Trial Results 7 - contrast passes glottis, visible subglottic residue remains despite patient's response (aspiration)   Response to Aspiration productive reflexive cough   Strategies and Compensations reduce bolus size;chin tuck   Diagnostic  Statement Patient had no penetration or aspiration with small sips given via spoon with head neutral. Tristian aspiration via cup sip, average bolus, and head neutral. Penetration to vocal cords and trace aspiration with use of small sip and chin tuck posture.   VFSS Eval: Mildly Thick Liquids   Mode of Presentation cup;spoon;self-fed   Order of Presentation 1,2   Preparatory Phase WFL   Oral Phase premature pharyngeal entry   Bolus Location When Swallow Triggered posterior laryngeal surface of epiglottis   Pharyngeal Phase impaired hyolaryngel excursion;impaired epiglottic movement;impaired pharyngoesophageal segment opening   Rosenbek's Penetration Aspiration Scale 1 - no aspiration, contrast does not enter airway   Diagnostic Statement No penetration or aspiration with small sips head neutral   Swallowing Recommendations   Diet Consistency Recommendations regular diet;mildly thick liquids (level 2);free water protocol   Supervision Level for Intake close supervision needed   Mode of Delivery Recommendations bolus size, small;slow rate of intake   Recommended Feeding/Eating Techniques (Swallow Eval) maintain upright sitting position for eating;maintain upright posture during/after eating for 30 minutes;minimize distractions during oral intake;moisten oral mucosa prior to intake;provide oral hygiene prior to intake   Comment, Swallowing Recommendations Patient is at high aspiration risk with thin liquids   Clinical Impression   Criteria for Skilled Therapeutic Interventions Met (SLP Eval) Yes, treatment indicated   SLP Diagnosis Dysphagia   Risks & Benefits of therapy have been explained evaluation/treatment results reviewed;care plan/treatment goals reviewed;risks/benefits reviewed;current/potential barriers reviewed;participants included;participants voiced agreement with care plan;patient   Clinical Impression Comments Videofluoroscopic Swallow Study completed. Patient had aspiration with thin liquids via average  size cup sip and presented with immediate cough. Deep penetration to vocal cords with trace aspiration occurred with thin via chin tuck posture. No penetration or aspiration with small tsp size sips of thin head neutral (NO chin tuck).  Oral phase WFL, anterior oral loss of bolus with thin. Tongue base retraction was WFL. Swallow response was initiated past the epiglottis with mildly thick and at the pyriform sinuses with thin, pooling noted with average sip size. Epiglottic inversion was posterior inferior.  Trace stasis occurred with thin at the base of the pyriform sinuses, clears with additional swallows and is reduced with small tsp size bolus. Hyolaryngeal elevation was WFL and hyolaryngeal excursion was reduced.  Tight UES opening.  Recommend diet of Regular and Mildly thick with Free Water Protocol.   SLP Total Evaluation Time   Evaluation, videofluoroscopic eval of swallow function Minutes (63583) 15   SLP Discharge Planning       SLP Discharge Recommendation Transitional Care Facility   SLP Rationale for DC Rec Swallow function and cognitive status are below his baseline.   SLP Brief overview of current status  Continue  on regular solids and mildly thick liquids - small/single sips, NO chin tuck, upright position, slow rate of intake, liquid wash PRN. Order soft textures of foods on MWF when he has dialysis. Free Water Protocol WITH 1:1  SUPERVISION: THIN water, between meals (30 mins before or after), after oral hygiene, when fully upright, small single sips NO chin tuck. Do not leave ice chips in front of him when eating a meal.  Could also consider ENT consult as OP given prolonged dysphonia.   Total Session Time   Total Session Time (sum of timed and untimed services) 15

## 2023-04-28 ENCOUNTER — TELEPHONE (OUTPATIENT)
Dept: TRANSPLANT | Facility: CLINIC | Age: 58
End: 2023-04-28
Payer: COMMERCIAL

## 2023-04-28 VITALS
RESPIRATION RATE: 20 BRPM | BODY MASS INDEX: 24.61 KG/M2 | WEIGHT: 181.44 LBS | DIASTOLIC BLOOD PRESSURE: 60 MMHG | TEMPERATURE: 97.8 F | SYSTOLIC BLOOD PRESSURE: 102 MMHG | OXYGEN SATURATION: 98 % | HEART RATE: 64 BPM

## 2023-04-28 DIAGNOSIS — Z94.4 LIVER TRANSPLANTED (H): Primary | ICD-10-CM

## 2023-04-28 LAB
ALBUMIN SERPL BCG-MCNC: 3.6 G/DL (ref 3.5–5.2)
ANION GAP SERPL CALCULATED.3IONS-SCNC: 12 MMOL/L (ref 7–15)
BUN SERPL-MCNC: 60.4 MG/DL (ref 6–20)
CALCIUM SERPL-MCNC: 10.1 MG/DL (ref 8.6–10)
CHLORIDE SERPL-SCNC: 99 MMOL/L (ref 98–107)
CREAT SERPL-MCNC: 5.91 MG/DL (ref 0.67–1.17)
DEPRECATED HCO3 PLAS-SCNC: 27 MMOL/L (ref 22–29)
GFR SERPL CREATININE-BSD FRML MDRD: 10 ML/MIN/1.73M2
GLUCOSE SERPL-MCNC: 100 MG/DL (ref 70–99)
PHOSPHATE SERPL-MCNC: 6.6 MG/DL (ref 2.5–4.5)
POTASSIUM SERPL-SCNC: 4.4 MMOL/L (ref 3.4–5.3)
PTH RELATED PROT SERPL-SCNC: 12.1 PMOL/L
SODIUM SERPL-SCNC: 138 MMOL/L (ref 136–145)

## 2023-04-28 PROCEDURE — 90937 HEMODIALYSIS REPEATED EVAL: CPT

## 2023-04-28 PROCEDURE — 258N000003 HC RX IP 258 OP 636: Performed by: INTERNAL MEDICINE

## 2023-04-28 PROCEDURE — 250N000013 HC RX MED GY IP 250 OP 250 PS 637: Performed by: STUDENT IN AN ORGANIZED HEALTH CARE EDUCATION/TRAINING PROGRAM

## 2023-04-28 PROCEDURE — 99232 SBSQ HOSP IP/OBS MODERATE 35: CPT | Performed by: HOSPITALIST

## 2023-04-28 PROCEDURE — 634N000001 HC RX 634: Performed by: INTERNAL MEDICINE

## 2023-04-28 PROCEDURE — 250N000011 HC RX IP 250 OP 636: Performed by: INTERNAL MEDICINE

## 2023-04-28 PROCEDURE — 82040 ASSAY OF SERUM ALBUMIN: CPT | Performed by: STUDENT IN AN ORGANIZED HEALTH CARE EDUCATION/TRAINING PROGRAM

## 2023-04-28 PROCEDURE — 90935 HEMODIALYSIS ONE EVALUATION: CPT

## 2023-04-28 PROCEDURE — 36415 COLL VENOUS BLD VENIPUNCTURE: CPT | Performed by: STUDENT IN AN ORGANIZED HEALTH CARE EDUCATION/TRAINING PROGRAM

## 2023-04-28 PROCEDURE — 250N000012 HC RX MED GY IP 250 OP 636 PS 637: Performed by: STUDENT IN AN ORGANIZED HEALTH CARE EDUCATION/TRAINING PROGRAM

## 2023-04-28 PROCEDURE — 90935 HEMODIALYSIS ONE EVALUATION: CPT | Performed by: INTERNAL MEDICINE

## 2023-04-28 RX ORDER — LACTULOSE 10 G/15ML
10 SOLUTION ORAL 2 TIMES DAILY
Qty: 946 ML | Refills: 0 | Status: SHIPPED | OUTPATIENT
Start: 2023-04-28 | End: 2023-06-20

## 2023-04-28 RX ORDER — PANTOPRAZOLE SODIUM 40 MG/1
40 TABLET, DELAYED RELEASE ORAL
Qty: 30 TABLET | Refills: 0 | Status: SHIPPED | OUTPATIENT
Start: 2023-04-29 | End: 2023-05-22

## 2023-04-28 RX ORDER — IPRATROPIUM BROMIDE AND ALBUTEROL SULFATE 2.5; .5 MG/3ML; MG/3ML
3 SOLUTION RESPIRATORY (INHALATION) EVERY 4 HOURS PRN
Qty: 90 ML | Refills: 0 | Status: SHIPPED | OUTPATIENT
Start: 2023-04-28 | End: 2023-04-28

## 2023-04-28 RX ORDER — B COMPLEX, C NO.20/FOLIC ACID 1 MG
1 CAPSULE ORAL DAILY
Qty: 30 CAPSULE | Refills: 0 | Status: SHIPPED | OUTPATIENT
Start: 2023-04-28 | End: 2023-05-22

## 2023-04-28 RX ORDER — TRAZODONE HYDROCHLORIDE 50 MG/1
50 TABLET, FILM COATED ORAL AT BEDTIME
Qty: 30 TABLET | Refills: 0 | Status: SHIPPED | OUTPATIENT
Start: 2023-04-28 | End: 2023-05-01

## 2023-04-28 RX ORDER — MIDODRINE HYDROCHLORIDE 10 MG/1
10 TABLET ORAL
Qty: 90 TABLET | Refills: 0 | Status: SHIPPED | OUTPATIENT
Start: 2023-04-28 | End: 2023-05-10

## 2023-04-28 RX ORDER — FOLIC ACID 1 MG/1
1 TABLET ORAL DAILY
Qty: 30 TABLET | Refills: 0 | Status: SHIPPED | OUTPATIENT
Start: 2023-04-28 | End: 2023-05-22

## 2023-04-28 RX ORDER — LACOSAMIDE 100 MG/1
100 TABLET ORAL EVERY 12 HOURS
Qty: 60 TABLET | Refills: 0 | Status: SHIPPED | OUTPATIENT
Start: 2023-04-28 | End: 2023-05-22

## 2023-04-28 RX ORDER — SEVELAMER CARBONATE 800 MG/1
800 TABLET, FILM COATED ORAL
Qty: 90 TABLET | Refills: 0 | Status: SHIPPED | OUTPATIENT
Start: 2023-04-28 | End: 2023-06-20

## 2023-04-28 RX ORDER — CARBOXYMETHYLCELLULOSE SODIUM 5 MG/ML
1 SOLUTION/ DROPS OPHTHALMIC 3 TIMES DAILY
Qty: 50 EACH | Refills: 1 | Status: SHIPPED | OUTPATIENT
Start: 2023-04-28 | End: 2023-05-02

## 2023-04-28 RX ORDER — TACROLIMUS 1 MG/1
1 CAPSULE ORAL EVERY 12 HOURS
Qty: 60 CAPSULE | Refills: 0 | Status: SHIPPED | OUTPATIENT
Start: 2023-04-28 | End: 2023-05-22

## 2023-04-28 RX ORDER — LEVETIRACETAM 1000 MG/1
1000 TABLET ORAL EVERY 24 HOURS
Qty: 30 TABLET | Refills: 0 | Status: SHIPPED | OUTPATIENT
Start: 2023-04-28 | End: 2023-05-22

## 2023-04-28 RX ORDER — LANOLIN ALCOHOL/MO/W.PET/CERES
3 CREAM (GRAM) TOPICAL AT BEDTIME
Qty: 30 TABLET | Refills: 0 | Status: SHIPPED | OUTPATIENT
Start: 2023-04-28 | End: 2023-05-22

## 2023-04-28 RX ORDER — ROPINIROLE 0.5 MG/1
0.5 TABLET, FILM COATED ORAL AT BEDTIME
Qty: 30 TABLET | Refills: 0 | Status: SHIPPED | OUTPATIENT
Start: 2023-04-28 | End: 2023-05-10

## 2023-04-28 RX ORDER — RAMELTEON 8 MG/1
8 TABLET ORAL EVERY EVENING
Qty: 30 TABLET | Refills: 0 | Status: SHIPPED | OUTPATIENT
Start: 2023-04-28 | End: 2023-05-22

## 2023-04-28 RX ORDER — NITROGLYCERIN 0.4 MG/1
TABLET SUBLINGUAL
Qty: 15 TABLET | Refills: 0 | Status: ON HOLD | OUTPATIENT
Start: 2023-04-28 | End: 2023-07-13

## 2023-04-28 RX ORDER — ACETYLCYSTEINE 200 MG/ML
2 SOLUTION ORAL; RESPIRATORY (INHALATION) 2 TIMES DAILY PRN
Qty: 10 ML | Refills: 0 | Status: SHIPPED | OUTPATIENT
Start: 2023-04-28 | End: 2023-04-28

## 2023-04-28 RX ADMIN — ACETAMINOPHEN 650 MG: 325 TABLET, FILM COATED ORAL at 12:26

## 2023-04-28 RX ADMIN — LACTULOSE 10 G: 10 SOLUTION ORAL at 12:25

## 2023-04-28 RX ADMIN — Medication 1 CAPSULE: at 12:26

## 2023-04-28 RX ADMIN — ACETAMINOPHEN 650 MG: 325 TABLET, FILM COATED ORAL at 08:35

## 2023-04-28 RX ADMIN — MIDODRINE HYDROCHLORIDE 10 MG: 5 TABLET ORAL at 12:26

## 2023-04-28 RX ADMIN — EPOETIN ALFA-EPBX 10000 UNITS: 10000 INJECTION, SOLUTION INTRAVENOUS; SUBCUTANEOUS at 09:52

## 2023-04-28 RX ADMIN — ACETAMINOPHEN 650 MG: 325 TABLET, FILM COATED ORAL at 04:24

## 2023-04-28 RX ADMIN — SODIUM CHLORIDE 300 ML: 9 INJECTION, SOLUTION INTRAVENOUS at 09:15

## 2023-04-28 RX ADMIN — SODIUM CHLORIDE 250 ML: 9 INJECTION, SOLUTION INTRAVENOUS at 09:16

## 2023-04-28 RX ADMIN — SEVELAMER CARBONATE 800 MG: 800 TABLET, FILM COATED ORAL at 13:02

## 2023-04-28 RX ADMIN — TACROLIMUS 1 MG: 1 CAPSULE ORAL at 07:15

## 2023-04-28 RX ADMIN — HEPARIN SODIUM 1900 UNITS: 1000 INJECTION INTRAVENOUS; SUBCUTANEOUS at 09:51

## 2023-04-28 RX ADMIN — MIDODRINE HYDROCHLORIDE 10 MG: 5 TABLET ORAL at 06:53

## 2023-04-28 RX ADMIN — PANTOPRAZOLE SODIUM 40 MG: 40 TABLET, DELAYED RELEASE ORAL at 07:14

## 2023-04-28 RX ADMIN — RIFAXIMIN 550 MG: 550 TABLET ORAL at 07:15

## 2023-04-28 RX ADMIN — APIXABAN 5 MG: 5 TABLET, FILM COATED ORAL at 07:15

## 2023-04-28 RX ADMIN — LACOSAMIDE 100 MG: 100 TABLET, FILM COATED ORAL at 07:16

## 2023-04-28 RX ADMIN — SIMETHICONE 80 MG: 80 TABLET, CHEWABLE ORAL at 06:21

## 2023-04-28 RX ADMIN — FOLIC ACID 1 MG: 1 TABLET ORAL at 12:26

## 2023-04-28 RX ADMIN — HEPARIN SODIUM 1900 UNITS: 1000 INJECTION INTRAVENOUS; SUBCUTANEOUS at 09:52

## 2023-04-28 ASSESSMENT — ACTIVITIES OF DAILY LIVING (ADL)
ADLS_ACUITY_SCORE: 48

## 2023-04-28 NOTE — PROGRESS NOTES
Occupational Therapy Discharge Summary    Reason for therapy discharge:    Discharged to home with home therapy.    Progress towards therapy goal(s). See goals on Care Plan in [facility] electronic health record for goal details.  Goals partially met.  Barriers to achieving goals:   discharge from facility.    Therapy recommendation(s):    Continued therapy is recommended.  Rationale/Recommendations: Recommended TCU, however patient to return home with home care and 24/7 supervision with I/ADLs, due to cognitive and balance deficits also for safety with ADLs and further cogntiive assessment..

## 2023-04-28 NOTE — PROGRESS NOTES
Speech Language Therapy Discharge Summary    Reason for therapy discharge:    Discharged to home with outpatient therapy.    Progress towards therapy goal(s). See goals on Care Plan in [facility] electronic health record for goal details.  Goals partially met.  Barriers to achieving goals:   discharge from facility.    Therapy recommendation(s):      Continued therapy is recommended.  Rationale/Recommendations:  Continue Regular textures and Mildy thick liquids with Free Water Protocol. Patient had VFSS on 4/27/23 and is at high aspiration risk with thin liquids. Discussed with the patient yesterday and provided written handouts with instructions for swallow strategies, diet recommendations, water protocol, and thickend liquids.     Discussed with RN today d/t no family present at time of appointment. RN will review handouts, recommendations, and how to thicken liquids with family.

## 2023-04-28 NOTE — DISCHARGE INSTRUCTIONS
Home Health Care [facility] has accepted you for home care services.  They will plan to see you after you have initiated primary care.    Outpatient Dialysis  [facility]  Monday Wednesday and Friday at 7:00 AM.  You will need to arrive at 6:30 on your first dialysis on 5/1/23  [address] [place] [address]  ([phone])

## 2023-04-28 NOTE — PROGRESS NOTES
Care Management Discharge Note    Discharge Date: 04/28/2023       Discharge Disposition: Home with spouse    Discharge Services: [facility] [employer] will start care on 5/3 with services RN/PT/OT/ST/HHA    Discharge DME: Walker, [employer]ised Toilet Seat    Discharge Transportation: Spouse and Brother Dylan    Private pay costs discussed: Not applicable    PAS Confirmation Code:  NA  Patient/family educated on Medicare website which has current facility and service quality ratings:  NA    Education Provided on the Discharge Plan: Yes  Persons Notified of Discharge Plans: Patient and entire team  Patient/Family in Agreement with the Plan: yes    Handoff Referral Completed: Yes    Additional Information:  Patient was discharged home with his Spouse Ofe.  Discharge instructions were completed with patient, Ofe and patient's Brother Dylan.  Dylan has really been involved a great deal the past few weeks and a huge support for Ofe.  Therapies were still recommending transition to a TCU, but this proved to not be possible.  [facility] has accepted patient for home care services of RN/PT/OT/ST and HHA.  Unfortunately, patient needs to initiate primary care before they will start care.  Conversed with their Intake department today.  They will plan to see him on 5/3.  They are aware of his dialysis times, so will plan to see him after dialysis.    CC called the [facility] concerning when patient would need follow-up.  Conversed with Jerrica, Transplant Coordinator for Dr Simms.  She has been following his labs.  She noted patient would not need an appointment scheduled at this time and Dr Simms is scheduling out to September.  She recommended that his PCP orders LFTs every 2-3 months.      Will send [facility] the Discharge summary when completed.  Patient has the welcome letter from [facility].  The Nephrologist has called in orders.  Patient's family will plan to stay with patient at  dialysis at least for the first few runs until patient is comfortable with this.    Patient has a new PCP appointment scheduled on 5/2/23 at [facility] and new Cardiology appointment on 5/4/23.      Rosalba Yoo, RN   Inpatient Care Management  [phone]

## 2023-04-28 NOTE — DISCHARGE SUMMARY
Hospitalist Discharge Summary      Date of Admission:  1/21/2023  Date of Discharge:  4/28/2023  Discharging Provider: Jack Felix MD  Discharge Service: Hospitalist Service    Discharge Diagnoses    1. Acute hypoxic and hypercapnic respiratory failure requiring mechanical ventilation due to bilateral pleural effusions   2. Candida lung infection present on admission   3. Acute metabolic encephalopathy with delirium, multifactorial  4. Hepatic encephalopathy  5. Anemia of end stage renal disease   6. Hypercalcemia   7. Hypomagnesemia    8. Hyperphosphatemia   9. Severe malnutrition, protein and calorie type  10. Moderate dysphagia    History of     Cirrhosis status post liver transplant 2016    End stage kidney disease on hemodialysis     Seizure disorder     Acute respiratory failure requiring tracheostomy     Pneumonia     ARDS     Right pneumothorax     Possible splenic infract     Chronic immunosuppression    Subacute bacterial peritonitis (SBP)    Paroxysmal atrial fibrillation    Chronic anticoagulation    Embolic strokes    PEA arrest     Chronic Hypotension    Diabetes mellitus type 2    Anxiety    Restless legs syndrome    Follow-ups Needed After Discharge   Follow-up Appointments     Follow-up and recommended labs and tests       Follow up with primary care provider, Physician No Ref-Primary, within 7   days for hospital follow- up.  The following labs/tests are recommended:   CBC and CMP.  In-center hemodialysis MWF's.  Cardiology and GI(at [facility]) as   scheduled.           Unresulted Labs Ordered in the Past 30 Days of this Admission     Date and Time Order Name Status Description    4/18/2023  3:15 PM PTH Related Peptide Test In process     1/19/2023  8:10 AM Prepare red blood cells (unit) Preliminary     1/8/2023  4:11 PM Prepare red blood cells (unit) Preliminary     1/8/2023  4:11 PM Prepare red blood cells (unit) Preliminary       These results will be  followed up by primary care provider     Discharge Disposition   Discharged to home  Condition at discharge: Stable    Hospital Course   Blanco Osborne is a 57 year-old male admitted on 1/21/2023 as a transfer from [facility] for evaluation of loculated pleural effusion. He has extensive PMH including h/o liver transplant 2016 due to alcohol abuse, pAF on chronic anticoagulation, history of diabetes mellitus type 2, CVA, hypertension, CHRISTIAN on BiPAP.  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 He was recovering in [facility] and was transferred to [facility] for consideration of chest tube placement and possible intrapleural lysis for worsening effusion    1. Acute metabolic encephalopathy with delirium, multifactorial, -resolving and now more chronic, hepatic encephalopathy  Chronic liver disease, history of liver transplant 2016  ESRD on HD  History of seizure, on Keppra and Vimpat     Continuing to have waxing and waning mentation,  Earlier in hospital stay, remained confused and had pulled out tracheostomy tube, PICC line and pulled his dialysis catheter- replaced.    Mental status continues to fluctuate with periods of alertness and increased sleepiness but without agitation for several days and overall improving.  Off restraints and sitter for several days.    Complicated, prolonged hospital course with mental status concerns multifactorial related to hepatic encephalopathy, end-stage renal disease on dialysis, past PEA arrest, seizure disorder, medication and chronic liver disease with prior liver transplant.     Continue to reorient and redirect as able. Maintain normal day/night cycles and sleep-wake cycles.  Minimize sedating medications as able.  Remains weak and deconditioned with complex, prolonged  hospital course and limited mobility.  Encouragement and support offered daily to patient.    Continue Lactulose to 10 mg BID, monitor for symptoms; goal to have 2-3 bowel movements per day.    Ongoing hemodialysis, as per nephrology, 3X/week dialysis schedule. Nephrology following    Continue ramelteon 8 mg at bedtime; monitor for side effects including AM sedation, reassess daily.    Started on olanzapine 5 mg BID and PRN earlier during this hospitalization. Delirium improved/resolved. Now scheduled HS dose discontinued. Plan to stop the doses with HD as well in upcoming days. Psychiatry recommended  trazodone 25 mg at bedtime, increased to 50 mg. Melatonin as well added.     Continue Keppra and Vimpat. No recurrent seizures reported of recent.     Neurology consult appreciated.  Patient has been started on ropinirole for restless leg syndrome.     Family stating that he is doing well with weaning down on zyprexa  -----currently receiving doses 3x's/week     2. Bilateral pleural effusions   Acute respiratory failure requiring tracheostomy  - resolved    Pneumonia  - resolved   ARDS  - resolved    Right pneumothorax - resolved   *Initial event was parainfluenza and strep pneumo pneumonia back in November 2022. Treated for ARDS. Had failed extubation x2 and tracheostomy performed on previous hospitalization. *Had additional complications of bilateral pneumothoraces as well as hydropneumothorax- pleural fluid grew candida parapsilosis  *Completed 4-week antifungal treatment. While in LTACH he was successfully weaned from the ventilator.  *Recently there were concern for worsening effusion while at [facility] and had thoracentesis 01/13 which returned exudative without growth on cultures. CT chest/abdomen/pelvis on 01/18 demonstrated worsening effusions and concerns for infected parapneumonic effusions with possible SBP.  Multiple pulmonologist at [facility] reviewed CT scan recommended transfer to [facility] for consideration  of chest tube placement and possible intrapleural lysis  *Thoracic surgery (Dr. Jackson) consulted during admission. CT chest small effusions on imaging and so chest tube was not inserted.   * ID and pulmonary as well consulted this stay and have since signed off. No abx.   *Patient pulled out his tracheostomy tube on the night 2/1-2/2 and is tolerating well without increased shortness of breath persistent cough or worsening hypoxia.     Stable on room air now    Encourage incentive spirometry as able, up to chair, deep breathing.    Tracheostomy site has healed now     3.  Possible splenic infract  Wedge shaped area on CT scan chest and CT abdomen on 3/7/23.    Hematology consulted, suspicion for infarct was low.    TTE no thrombus or vegetations.     4. S/p liver transplant 2016   Chronic immunosuppression, on tacrolimus  Cirrhosis with ascites  Subacute bacterial peritonitis (SBP), resolved  *Main manifestations of this are hepatic encephalopathy and ascites.  Hepatologist at [facility] felt that most likely reason for the cirrhosis was metabolic syndrome or alcohol intake.  There was no evidence of rejection on biopsy and there was some evidence of regeneration. Paracentesis done on 12/22/2022 showed lactobacillus indicating SBP, treated with Unasyn and Augmentin, finished 2-week course 1/12/2023.  Requires large-volume paracentesis periodically for recurring ascites.    *Paracentesis 1/13/2023 yielded about 7500 cc. Cultures NGTD. paracentesis on 1/24 with 4.8 L fluid removal  Paracentesis on 3/6/23 No SBP    CT abdomen 3/9 small to moderate ascites. Continue intermittent paracentesis as needed if develops symptomatic ascites.    Monitor for signs and symptoms of recurrent spontaneous bacterial peritonitis.    Further treatment for recurrent ascites with TIPS deferred to his Liver Transplant team and/or Hepatology, he had an appointment on 4/21/2023 with Hepatology, Dr. Simms but needs to reschedule given  "ongoing hosptial stay.    Continue Tacrolimus 1 mg BID, rifaximin 550 mg BID.    Continue lactulose as ordered, titrate as needed to have 2-3 BMs    Follow up in hepatology clinic      5. Goals of care   As per prior rounding provider, \"on 1/25 nursing had mentioned that patient had refused to undergo dialysis and want to stop care, palliative care was consulted.  Patient and his spouse denied wanting to stop care and wanted ongoing restorative care including full code\"    Full Code status.     6. Diarrhea, improved, on lactulose for chronic liver disease    C difficile negative on 1/31. Secondary to lactulose. Discontinued rectal tube (2/12) as stools more formed.    Continue lactulose with goal of 2 to 3 soft bowel movement in 24 hours.     7. Paroxysmal atrial fibrillation  Chronic anticoagulation, apixaban  History of embolic strokes  Remains in sinus rhythm, on anticoagulation with apixaban indefinitely for stroke prophylaxis.      Continue apixaban anticoagulation, it was briefly held for fistula placement.    Follow up with cardiologist      8. Acute anemia, normocytic  Patient has required intermittent transfusions for on-going anemia.  Anemia most likely secondary to critical illness/chronic disease, chronic renal disease.  Baseline hemoglobin around 7-8.  Likely Epo resistance.    Hemoglobin fairly stable now between 8-9    Last hgb 8.9 (4/14/23)    On daily folate supplements    Follow up with chest pain and nephrologist      9. Hypercalcemia  Work-up shows low/low normal vitamin D, suppressed PTH.  But PTH related peptide is elevated.    Nephrology following and considered prolia, however patient's wife would like to hold off for now.    Holding VIT D and Phoslo.     Discussed with Dr Moulton regarding elevated PTHrP, suspected due to prolonged immobility. Plan is to interval follow up in a month or so. If trends up, then will plan forther work up.      10. History PEA arrest   PEA arrest prior to his " previous admission and 1 episode of PEA associated with desaturation on 12/20.  No structural cardiac disease.     Follow up with cardiology      11. Chronic Hypotension  In the past has developed baseline hypotension with cirrhosis and prolonged critical illness.  On hemodialysis.  Receiving midodrine and albumin during dialysis.    Continue midodrine       12. History of seizure   After hypoxic event, in the context of baseline multifactorial encephalopathy secondary to his ongoing critical illness and prior cardiac arrests and prior strokes and cirrhosis with hepatic encephalopathy. MRI of head of 12/20/22 reveals no PRES or leptomeningeal enhancement but scattered.  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   No recent seizure activity.    Continue levetiracetam, lacosamide.    Seizure precautions.    Outpatient follow-up with neurology      13. ESRD  Anemia due to renal disease  Hypotension during dialysis  Hyperphosphatemia    Nephrology reviewing vein mapping to assess options for internal access placement for dialysis, see note 3/16.    Underwent Fistula placement on  3/21/22.    Vascular surgery following-continue to use for tunnel catheter.  Outpatient follow-up with vascular surgery and for will need a follow-up ultrasound of the fistula in 6 weeks to assess patency.    Started sevalmer 800mg TID with meals on 3/27/23.    Complained of cramping with dialysis. Tried a dose of oxycodone prior to dialysis but this was not helpful. Spouse prefers to avoid narcotic given encephalopathy.      Vitamin  B12 level within normal  limits     14. Diabetes mellitus type 2  *Hemoglobin A1c on November 2022 was 4.7  *By report, on Lantus insulin in the past.  Blood sugar checks and sliding scale insulin previously discontinued as has been stable without insulin needs.    Follow up with primary care provider      15. Hypomagnesemia    Resolved with  replacement.     16. Severe malnutrition, protein and calorie type  Moderate dysphagia  G-tube feedings    Continue with tube feeds via PEG tube.    IR replaced the PEG tube on 2/8/2023, monitor.    Nutritionist consulted and following for tube feeds.    SLP following as well. Oral diet as per speech and language therapy (SLP). Patient hopeful to advance to thin liquids soon.    2/20 Nutrition following for tube feeds.    Now undergoing PRN tube feeds.    Encourage high-protein diet.    Nutrition notes reviewed, patient eating 0 to 100% of meals.  Continue current interventions.  As needed tube feed boluses available if patient consumes less than 50% of the meal.    Family encouraging patient to eat high protein diet including protein bars    PEG removed this admission      17. Anxiety  Fluoxetine was discontinued as per psychiatry recommendation on 2/2 (see consult note for details).     18. Restless leg  Syndrome    Patient started on ropiranole by neurology.    Consultations This Hospital Stay   NEPHROLOGY IP CONSULT  THORACIC SURGERY IP CONSULT  RESPIRATORY CARE IP CONSULT  NUTRITION SERVICES ADULT IP CONSULT  WOUND OSTOMY CONTINENCE NURSE  IP CONSULT  INFECTIOUS DISEASES IP CONSULT  NUTRITION SERVICES ADULT IP CONSULT  VASCULAR ACCESS ADULT IP CONSULT  CARE MANAGEMENT / SOCIAL WORK IP CONSULT  PHYSICAL THERAPY ADULT IP CONSULT  PALLIATIVE CARE ADULT IP CONSULT  OCCUPATIONAL THERAPY ADULT IP CONSULT  VASCULAR ACCESS ADULT IP CONSULT  VASCULAR ACCESS ADULT IP CONSULT  PULMONARY IP CONSULT  THORACIC SURGERY IP CONSULT  SPEECH LANGUAGE PATH ADULT IP CONSULT  PSYCHIATRY IP CONSULT  VASCULAR ACCESS ADULT IP CONSULT  ENT IP CONSULT  VASCULAR ACCESS ADULT IP CONSULT  WOUND OSTOMY CONTINENCE NURSE  IP CONSULT  INTERVENTIONAL RADIOLOGY ADULT/PEDS IP CONSULT  PHARMACY IP CONSULT  PHYSICAL THERAPY ADULT IP CONSULT  CARE MANAGEMENT / SOCIAL WORK IP CONSULT  VASCULAR ACCESS ADULT IP CONSULT  VASCULAR ACCESS ADULT IP  CONSULT  PSYCHIATRY IP CONSULT  HEMATOLOGY & ONCOLOGY IP CONSULT  PULMONARY IP CONSULT  VASCULAR SURGERY IP CONSULT  NUTRITION SERVICES ADULT IP CONSULT  NEUROLOGY IP CONSULT  DENTIST IP CONSULT  NUTRITION SERVICES ADULT IP CONSULT  PSYCHIATRY IP CONSULT  OCCUPATIONAL THERAPY ADULT IP CONSULT  SPEECH LANGUAGE PATH ADULT IP CONSULT  NUTRITION SERVICES ADULT IP CONSULT  INTERVENTIONAL RADIOLOGY ADULT/PEDS IP CONSULT  INTERVENTIONAL RADIOLOGY ADULT/PEDS IP CONSULT  INTERVENTIONAL RADIOLOGY ADULT/PEDS IP CONSULT  NUTRITION SERVICES ADULT IP CONSULT    Code Status   Full Code    Time Spent on this Encounter   I, Jack Felix MD, personally saw the patient today and spent greater than 30 minutes discharging this patient.       Jack Felix MD  [place] [address] [facility]  [address] [place] [address]  Phone: [phone]  ______________________________________________________________________    Physical Exam   Vital Signs: Temp: 97.8  F (36.6  C) Temp src: Oral BP: 102/60 Pulse: 64   Resp: 20 SpO2: 98 % O2 Device: None (Room air)    Weight: 181 lbs 7.02 oz  Constitutional: awake, alert, cooperative, no apparent distress  Neuropsychiatric: General: normal, calm and normal eye contact    Primary Care Physician   Physician No Ref-Primary    Discharge Orders      Medication Therapy Management Referral      Home Care Referral      Adult GI  Referral - Consult Only      Adult Cardiology Eval  Referral      Reason for your hospital stay    Altered mental status     Follow-up and recommended labs and tests     Follow up with primary care provider, Physician No Ref-Primary, within 7 days for hospital follow- up.  The following labs/tests are recommended: CBC and CMP.  In-center hemodialysis MWF's.  Cardiology and GI(at [facility]) as scheduled.     Activity    Your activity upon discharge: activity as tolerated. Ambulate with walker     Walker Order for DME - ONLY  FOR DME    I, the undersigned, certify that the above prescribed supplies are medically necessary for this patient and is both reasonable and necessary in reference to accepted standards of medical and necessary in reference to accepted standards of medical practice in the treatment of this patient's condition and is not prescribed as a convenience.      Diet    Follow this diet upon discharge: Combination Diet Regular Diet; Mildly Thick (level 2); Renal Diet (dialysis)       Significant Results and Procedures   Most Recent 3 CBC's:  Recent Labs   Lab Test 04/23/23  0812 04/14/23  0725 04/12/23  0755 04/08/23  0859   WBC  --  3.6* 2.9* 3.4*   HGB 8.4* 8.9* 8.7* 9.0*   MCV  --  97 97 101*   PLT  --  109* 95* 95*     Most Recent 3 BMP's:  Recent Labs   Lab Test 04/28/23  0800 04/27/23  1022 04/26/23  1253    141 141   POTASSIUM 4.4 3.9 3.8   CHLORIDE 99 101 102   CO2 27 26 30*   BUN 60.4* 48.5* 29.4*   CR 5.91* 4.90* 3.56*   ANIONGAP 12 14 9   KELLY 10.1* 9.9 9.1   * 115* 132*     Most Recent 2 LFT's:  Recent Labs   Lab Test 04/05/23  0959 03/14/23  1033   AST 16 20   ALT <5* 8*   ALKPHOS 184* 177*   BILITOTAL 0.4 0.4     Most Recent 3 INR's:  Recent Labs   Lab Test 12/21/22  1315 12/16/22  2239 12/16/22  1620   INR 1.36* 1.14 1.58*     Most Recent 3 Troponin's:No lab results found.  7-Day Micro Results     No results found for the last 168 hours.        Most Recent Hemoglobin A1c:  Recent Labs   Lab Test 11/19/22  0212   A1C 4.7     Most Recent 6 glucoses:  Recent Labs   Lab Test 04/28/23  0800 04/27/23  1022 04/26/23  1253 04/25/23  1113 04/24/23  0815 04/23/23  0812   * 115* 132* 117* 97 108*     Most Recent Urinalysis:  Recent Labs   Lab Test 11/25/22  1859 10/31/17  1038   COLOR Yellow [place]   APPEARANCE Slightly Cloudy* Slightly Cloudy   URINEGLC Negative Negative   URINEBILI Negative Negative   URINEKETONE Negative 5*   SG 1.017 1.021   UBLD Moderate* Negative   URINEPH 5.5 5.0   PROTEIN 70*  Negative   NITRITE Negative Negative   LEUKEST Small* Negative   RBCU  --  <1   WBCU  --  1   ,   Results for orders placed or performed during the hospital encounter of 01/21/23   CT Chest w/o Contrast    Narrative    EXAM: CT CHEST WITHOUT CONTRAST  LOCATION: [facility]  DATE/TIME: 01/22/2023, 11:17 AM    INDICATION: Pleural effusions.  COMPARISON: 01/18/2023.  TECHNIQUE: CT chest without IV contrast. Multiplanar reformats were obtained. Dose reduction techniques were used.  CONTRAST: None.    FINDINGS:   LUNGS AND PLEURA: Small pleural effusion on the right with moderately extensive pleural thickening. Minimal effusion on the left with similar moderate pleural thickening. Areas of atelectasis versus less likely infiltrate are seen bilaterally.    MEDIASTINUM/AXILLAE: No gross adenopathy in the absence of contrast. No aortic aneurysm demonstrated.    CORONARY ARTERY CALCIFICATION: Moderate.    UPPER ABDOMEN: Splenomegaly at 15.8 cm. Ascites and possibly infiltrative changes in the omentum. These are better seen on recent imaging of the abdomen and pelvis.    MUSCULOSKELETAL: No frankly destructive bony lesions.      Impression    IMPRESSION:   1.  Small right and minimal left effusions with moderately extensive pleural thickening bilaterally. Effusions are similar to previous.  2.  Mild probable atelectasis versus less likely infiltrate in both lungs. This is similar to previous.       US Paracentesis without Albumin    Narrative    US PARACENTESIS WITHOUT ALBUMIN 1/24/2023 4:28 PM     HISTORY: HIGH VOLUME paracentesis with or without diagnostic fluid  analysis with labs to be drawn if ordered. Total paracentesis volume  as much as possible.    FINDINGS: Ultrasound was used to evaluate for the presence and best  approach for paracentesis. Written and oral informed consent was  obtained. A pause for the cause procedure to verify the correct  patient and correct procedure. The skin  overlying the right lower  quadrant was prepped and draped in the usual sterile fashion. The  subcutaneous tissues were anesthetized with 10mL 1% lidocaine. A  catheter was advanced into the peritoneal space and 4.8 L of  straw  colored fluid was drained. There were no immediate complications.  Ultrasound images were permanently stored.  Patient left the  ultrasound suite in satisfactory condition.      Impression    IMPRESSION: Technically successful paracentesis without immediate  complications.    HANS WESTON MD         SYSTEM ID:  [id number]   XR Chest Port 1 View    Narrative    EXAM: XR CHEST PORT 1 VIEW  LOCATION: [facility]  DATE/TIME: 1/29/2023 5:12 PM    INDICATION: Evaluate PICC positioning, patient pulled dressing  COMPARISON: 01/13/2023.      Impression    IMPRESSION:     Right PICC tip over the distal SVC. Right IJ catheter tip over the right atrium. Tracheostomy tube over the proximal trachea.    Hypoexpanded lungs. Mild basilar opacities, likely atelectasis. Small pleural effusions. No pneumothorax. Stable mildly enlarged cardiomediastinal silhouette.       XR Chest Port 1 View    Narrative    EXAM: XR CHEST PORT 1 VIEW  LOCATION: [facility]  DATE/TIME: 2/2/2023 6:47 AM    INDICATION: PICC confirmation (pt pulled back); document central or not  COMPARISON: 1/29/2023.    FINDINGS: The right PICC has been pulled back and the tip is now at the junction of subclavian vein and superior vena cava. Right IJ infusion catheter in place. No pneumothorax. The heart is enlarged. There is no pulmonary edema. Right pleural effusion.   Mild bibasilar infiltrates.      Impression    IMPRESSION: Right PICC with tip at the junction of subclavian vein and SVC.   XR Chest Port 1 View    Narrative    CHEST PORTABLE ONE VIEW  2/3/2023 9:39 AM       INDICATION: Partially pulled dialysis port, evaluate placement.  COMPARISON: 2/2/2023       Impression    IMPRESSION: Right  IJ dialysis catheter remains in good position with  tip in the right atrium. Previously seen PICC line no longer visible.  Cardiomegaly. Chronic small bilateral pleural effusions with adjacent  atelectasis in the lung bases. No pulmonary edema.       DYANA SANCHEZ MD         SYSTEM ID:  [id number]   XR Abdomen Port 1 View    Narrative    EXAM: XR ABDOMEN PORT 1 VIEW  LOCATION: [facility]  DATE/TIME: 2/5/2023 1:44 AM    INDICATION: G tube placement confirmation  COMPARISON: None.      Impression    IMPRESSION: Contrast material injected through the G-tube is within the stomach. No extraluminal contrast.   XR Video Swallow with SLP or OT    Narrative    VIDEO SWALLOWING EVALUATION   2/7/2023 2:25 PM     HISTORY: assess aspiration risk, potential to advance diet    COMPARISON: Swallow study 01/13/2023.    FLUOROSCOPY TIME: 2.3 minutes     Number of cine runs: 10    FINDINGS:    Thin: Silent aspiration.    Mildly thick: Penetration to the cords. No associated cough reflex.    Pudding: There was some residue in the vallecula and piriform sinuses  but no aspiration or penetration.    Semisolid: There was some residue in the vallecula and piriform  sinuses but no aspiration or penetration.    Solid: There was some residue in the vallecula and piriform sinuses  but no aspiration or penetration.    Please see the separately dictated speech pathologist's notes for  further details.    This study only includes the cervical esophagus.    ALEJANDRA WHITE MD         SYSTEM ID:  [id number]   IR Gastrostomy Tube Change    Narrative    PROCEDURE(S):   Percutaneous gastrostomy tube replacement.    DATE OF PROCEDURE: 2/8/2023 10:46 AM    MEDICATIONS: None    CONTRAST: 10 mL Isovue into the gastric lumen     REFERENCED AIR KERMA: 0.23 mGy  FLUOROSCOPY TIME: 0.01 minutes    ESTIMATED BLOOD LOSS:  Minimal    COMPLICATIONS:  None    CLINICAL HISTORY/INDICATION: G-Tube removed by patient overnight -  initial  placement 11/29/22.     PROCEDURES AND FINDINGS:  Following a discussion of the risks, benefits, indications and  alternatives to treatment, appropriate informed consent was obtained.   The patient was brought to the fluoroscopy suite and placed supine on  the table. The patient's left upper and mid abdomen were prepped and  draped in the usual sterile fashion. A time out was performed per  hospital universal protocol policy to ensure correct patient, site and  procedure to be performed.     Through the existing tract a new 14 [place] gastrostomy tube was  advanced into the stomach. The retention balloon was deflated and the  tube was removed under fluoroscopic guidance maintaining wire access.  A new 14 Icelandic gastrostomy tube ______ The retention balloon was  insufflated with 5 mL of dilute contrast.  The retention disk was  cinched to the skin. Contrast injection confirmed the intraluminal  positioning of the tube.     Throughout the procedure, the patient was monitored by a radiology  nurse for cardiac rhythm and oxygen saturation which remained stable.  The patient tolerated the procedure well and left interventional  radiology in stable condition.      Impression    IMPRESSION:  14 Icelandic KARAN type gastrostomy tube replacement, as detailed above.    INDIGO CAMPUZANO DO         SYSTEM ID:  [id number]   XR Video Swallow with SLP or OT    Narrative    VIDEO SWALLOWING EVALUATION   2/23/2023 10:08 AM     HISTORY: Dysphagia, trach history.    COMPARISON: None.    FLUOROSCOPY TIME: 1.4 minutes.     Number of cine runs: 13    FINDINGS:    The patient had an episode of a small amount of silent aspiration with  thin barium. The patient is available able to swallow mildly thickened  barium with intermittent penetration, but no aspiration. Puree and  semisolid consistencies were swallowed without difficulty. Please see  the speech pathologist's note for additional details and  recommendations.    This study only includes  the cervical esophagus.    SONIA SANDY MD         SYSTEM ID:  [id number]   XR Video Swallow with SLP or OT    Narrative    VIDEO SWALLOWING EVALUATION   3/3/2023 2:21 PM     HISTORY: reassess thin liquids    COMPARISON: 2/23/2023 swallow study.    FLUOROSCOPY TIME: 1.2 minutes     Number of cine runs: 8    FINDINGS: Thin liquid consistency demonstrated an episode of flash  penetration and an episode of deep penetration that cleared with  subsequent cough. No serge aspiration on thin liquid consistency.  Residual in the vallecula is seen with thin liquid consistency on each  swallow. Puree consistency taken with teaspoon and with regular food  demonstrates residual without penetration or aspiration.    This study only includes the cervical esophagus. Please see the speech  pathologist report for further details.    DARREN EVANS MD         SYSTEM ID:  [id number]   XR Chest 2 Views    Narrative    EXAM: XR CHEST 2 VIEWS  LOCATION: [facility]  DATE/TIME: 3/4/2023 2:42 PM    INDICATION: Cough SOB  COMPARISON: 2/3/2023      Impression    IMPRESSION: Prominent cardiomegaly unchanged with coronal IJ dialysis catheter in place. Pulmonary vessels appear normal. Some increase in opacity at right base likely represents a combination of small right pleural effusion and atelectasis/infiltrate.   Upper lung zones remain clear.   US Paracentesis with Albumin    Narrative    US PARACENTESIS WITH ALBUMIN 3/6/2023 3:22 PM    CLINICAL HISTORY: diagnostic and therapeutic    PROCEDURE: Informed consent obtained. Time out performed. The abdomen  was prepped and draped in a sterile fashion. 10 mL of 1% lidocaine was  infused into local soft tissues. A 5 [place] catheter system was  introduced into the abdominal ascites under ultrasound guidance.    3.9 liters of clear fluid were removed and sent to lab if requested.    Patient tolerated procedure well.    Ultrasound imaging was obtained and placed in the  patient's permanent  medical record.      Impression    IMPRESSION:  1.  Status post ultrasound-guided paracentesis.    DYANA SANCHEZ MD         SYSTEM ID:  [id number]   CT Chest w/o Contrast    Narrative    CT CHEST WITHOUT CONTRAST 3/7/2023 2:41 PM     HISTORY: RLL lesion.    COMPARISON: January 22, 2023    TECHNIQUE: Volumetric helical acquisition of CT images of the chest  from the clavicles to the kidneys were acquired without IV contrast.  Radiation dose for this scan was reduced using automated exposure  control, adjustment of the mA and/or kV according to patient size, or  iterative reconstruction technique.    FINDINGS:     LUNGS AND PLEURA: Minimal improvement in right pleural effusion,  pleural thickening since comparison. Similar associated atelectasis  vs. less likely infiltrate. Similar minimal left pleural effusion with  associated compressive atelectasis and/or infiltrate. Stable  granulomatous change.    MEDIASTINUM/AXILLAE: No gross adenopathy in the absence of contrast.  Mildly enlarged pulmonary artery similar to previous, question  pulmonary hypertension. No aortic aneurysm.    CORONARY ARTERY CALCIFICATIONS: Moderate.    UPPER ABDOMEN: Splenomegaly is again evident measuring 20 cm. There is  a new wedge-shaped area of decreased attenuation, this could represent  a splenic infarct.    MUSCULOSKELETAL: No frankly destructive bony lesions.      Impression    IMPRESSION:  1.  Slight improvement in right pleural effusion and pleural  thickening since the comparison study.  2.  Similar bilateral infiltrates and minimal left effusion.  3.  New wedge-shaped area of decreased attenuation in the spleen,  question a splenic infarct.    HANS WESTON MD         SYSTEM ID:  [id number]   CT Head w/o Contrast    Narrative    EXAM: CT HEAD W/O CONTRAST  LOCATION: [facility]  DATE/TIME: 3/8/2023 6:06 PM    INDICATION: confusion  COMPARISON: 12/20/2022  TECHNIQUE: Routine CT Head without  IV contrast. Multiplanar reformats. Dose reduction techniques were used.    FINDINGS:  INTRACRANIAL CONTENTS: No intracranial hemorrhage, extraaxial collection, or mass effect.  No CT evidence of acute infarct. Mild presumed chronic small vessel ischemic changes. Tiny low-attenuation lesion inferior to the basal ganglia likely represents a   prominent perivascular space. Mild generalized volume loss. No hydrocephalus.     VISUALIZED ORBITS/SINUSES/MASTOIDS: No intraorbital abnormality. Mild mucosal thickening scattered about the paranasal sinuses. No middle ear or mastoid effusion.    BONES/SOFT TISSUES: No acute abnormality.      Impression    IMPRESSION:  1.  No CT evidence for acute intracranial process.  2.  Brain atrophy and presumed chronic microvascular ischemic changes as above.   CT Abdomen w/o Contrast    Narrative    CT ABDOMEN WITHOUT CONTRAST  3/9/2023 8:15 AM     HISTORY: Splenomegaly. Question splenic infarct.    COMPARISON: Chest CT from March 7, 2023, chest, abdomen and pelvis CT  from January 6, 2023 images are not available for comparison. Abdomen  and pelvis CT from December 19, 2022.    TECHNIQUE: Volumetric helical acquisition of CT images of the abdomen  without contrast. Radiation dose for this scan was reduced using  automated exposure control, adjustment of the mA and/or kV according  to patient size, or iterative reconstruction technique.    FINDINGS: Wedge-shaped area in the spleen again noted suspicious for a  small splenic infarct. Spleen measures 18.3 cm in craniocaudal  dimension, 19.3 cm in AP dimension. Small to moderate amount of  ascites. Nonobstructing renal stone on the right measuring 5 mm in the  upper pole. No other urolithiasis. No hydronephrosis. No dilated  bowel. No focal liver lesions. Pancreas and adrenal glands grossly  unremarkable. No frankly destructive bony lesions.      Impression    IMPRESSION:  1. Splenomegaly with a small probable infarct superiorly, infarct  is  new since the most recent available abdomen comparison.   2. Small to moderate ascites.    HANS WESTON MD         SYSTEM ID:  [id number]   XR Video Swallow with SLP or OT    Narrative    VIDEO SWALLOWING EVALUATION   3/17/2023 2:31 PM     HISTORY: Dysphagia    COMPARISON: None.    FLUOROSCOPY TIME: 1.2 minutes     Number of cine runs: 7    FINDINGS:    Thin: Penetration to the cords on multiple swallows. One episode of  aspiration with spontaneous cough. No significant residue.    Slightly thick: Not administered.    Mildly thick: Not administered.    Moderately thick: Not administered.    Puree: Not administered.    Semisolid: Not administered.    Regular: Premature spill to the vallecula. Otherwise normal.    This study only includes the cervical esophagus.    ARY COLINDRES MD         SYSTEM ID:  [id number]   US Upper Extremity Venous Mapping Bilateral    Narrative    US UPPER EXTREMITY VENOUS MAPPING BILATERAL  3/16/2023 3:12 PM     HISTORY: End-stage renal disease. Preoperative evaluation prior to  dialysis access creation.    COMPARISON: None    FINDINGS:   Right upper extremity:  Cephalic vein: The right cephalic vein is patent. Its diameters in the  arm range between 3.8 to 2.0 mm. The vein divides into two branches in  the distal arm. Diameters of a lateral branch range between 4.6 to 1.5  mm. Diameters of a medial branch range between 3.0 to 2.7 mm. The  distal portion of the medial branch is obscured due to IV site  dressing.    The right basilic vein is patent in the arm to proximal forearm. Its  diameters range between 4.7 to 1.9 mm.    Left upper extremity: The left cephalic vein is patent. Its diameters  in the arm range between 2.7 to 1.5 mm. Its diameters in the forearm  range between 2.7 to 1.9 mm.    The left basilic vein is patent in the arm and proximal forearm. Its  diameters range between 6.0 to 3.7 mm.      Impression    IMPRESSION: Vein mapping performed as above. The left basilic  vein  appears to be the largest caliber vein.    DANIELA CRUZ MD         SYSTEM ID:  [id number]   XR Video Swallow with SLP or OT    Narrative    VIDEO SWALLOWING EVALUATION   2023 10:48 AM     HISTORY: Repeat study due to silent aspiration.    COMPARISON: 3/17/2023.    FLUOROSCOPY TIME: 0.5 minutes.  SPOT IMAGES OR CINE RUNS: 7      Impression    IMPRESSION:    Thin: Aspiration with cough. Aspiration appeared to continue with chin  tuck maneuver.    Mildly thick: No penetration or aspiration.    This study only includes the cervical esophagus.      ERASMO FOREMAN MD         SYSTEM ID:  [id number]   Echocardiogram Complete    Narrative    [id number]  [id number]  [id number]  [id number]^JALEEL^GONZALO^JOSEP     [facility]  Echocardiography Laboratory  [address] [place] [address]     Name: MARY JO CRAIN  MRN: [id number]  : 1965  Study Date: 2023 01:42 PM  Age: 57 yrs  Gender: Male  Patient Location: [facility]  Reason For Study: Endocarditis  Ordering Physician: GONZALO MARMOLEJO  Referring Physician: No Ref-Primary, Physician  Performed By: Starla Swanson RDCS     BSA: 2.0 m2  Height: 72 in  Weight: 174 lb  HR: 84  BP: 84/57 mmHg  ______________________________________________________________________________  Procedure  Complete Portable Echo Adult.  ______________________________________________________________________________  Interpretation Summary     Pt requested exam be done; no contrast used  pt agitated and confused. Limited images were obtained, suboptimal study  Left ventricular systolic function is normal.  Distal septum and apex now well seen, cannot assess wall motion in this zone.  Normal wall motion listed on echo 22  Trivial aortic valve stenosis  The aortic valve is trileaflet with aortic valve sclerosis.  There is mild (1+) aortic regurgitation.  The ascending aorta is Mildly dilated.  Sinus Valsalva 38mm, Asc aorta 39mm  Small  posterior pericardial effusion  7-8mm posterior pericardial effusion (no tamponade)  No obvious vegetations seen on this somewhat suboptimal study. Consider FRANKLIN if  stong concern of SBE  ______________________________________________________________________________  Left Ventricle  The left ventricle is normal in size. There is borderline concentric left  ventricular hypertrophy. Left ventricular systolic function is normal. Left  ventricular diastolic function is not assessable. Distal septum and apex now  well seen, cannot assess wall motion in this zone. Normal wall motion listed  on echo 12/20/22. Regional wall motion abnormalities cannot be excluded due to  limited visualization.     Right Ventricle  The right ventricle is normal in size and function.     Atria  Normal left atrial size. Right atrial size is normal.     Mitral Valve  There is moderate mitral annular calcification.     Tricuspid Valve  Right ventricular systolic pressure could not be approximated due to  inadequate tricuspid regurgitation.     Aortic Valve  The aortic valve is trileaflet with aortic valve sclerosis. There is mild (1+)  aortic regurgitation. Trivial aortic valve stenosis.     Pulmonic Valve  The pulmonic valve is not well seen, but is grossly normal.     Vessels  The ascending aorta is Mildly dilated. Sinus Valsalva 38mm, Asc aorta 39mm.  Inferior vena cava not well visualized for estimation of right atrial  pressure.     Pericardium  Small posterior pericardial effusion. 7-8mm posterior pericardial effusion (no  tamponade).     ______________________________________________________________________________  MMode/2D Measurements & Calculations  IVSd: 1.2 cm  LVIDd: 4.0 cm  LVIDs: 2.5 cm  LVPWd: 1.2 cm  FS: 37.7 %  LV mass(C)d: 165.5 grams  LV mass(C)dI: 82.4 grams/m2     Ao root diam: 3.8 cm  LA dimension: 3.5 cm  asc Aorta Diam: 3.9 cm  LA/Ao: 0.91  RWT: 0.60     Doppler Measurements & Calculations  MV E max brain: 43.5 cm/sec  MV  A max brain: 61.8 cm/sec  MV E/A: 0.70  MV dec time: 0.18 sec  Ao V2 max: 203.2 cm/sec  Ao max P.0 mmHg  Ao V2 mean: 150.2 cm/sec  Ao mean PG: 10.0 mmHg  Ao V2 VTI: 41.3 cm  PA acc time: 0.08 sec  E/E' av.5  Lateral E/e': 6.9  Medial E/e': 12.1     ______________________________________________________________________________  Report approved by: Tereso Allan 2023 02:53 PM               Discharge Medications   Discharge Medication List as of 2023  1:52 PM      START taking these medications    Details   folic acid (FOLVITE) 1 MG tablet Take 1 tablet (1 mg) by mouth daily, Disp-30 tablet, R-0, E-Prescribe      Lacosamide (VIMPAT) 100 MG TABS tablet Take 1 tablet (100 mg) by mouth every 12 hours, Disp-60 tablet, R-0, Local Print      levETIRAcetam (KEPPRA) 1000 MG tablet Take 1 tablet (1,000 mg) by mouth every 24 hours, Disp-30 tablet, R-0, E-Prescribe      melatonin 3 MG tablet Take 1 tablet (3 mg) by mouth At Bedtime, Disp-30 tablet, R-0, E-Prescribe      multivitamin RENAL (TRIPHROCAPS) 1 MG capsule Take 1 capsule by mouth daily, Disp-30 capsule, R-0, E-Prescribe      nitroGLYcerin (NITROSTAT) 0.4 MG sublingual tablet For chest pain place 1 tablet under the tongue every 5 minutes for 3 doses. If symptoms persist 5 minutes after 1st dose call 911., Disp-15 tablet, R-0, E-Prescribe      pantoprazole (PROTONIX) 40 MG EC tablet Take 1 tablet (40 mg) by mouth every morning (before breakfast), Disp-30 tablet, R-0, E-Prescribe      ramelteon (ROZEREM) 8 MG tablet Take 1 tablet (8 mg) by mouth every evening, Disp-30 tablet, R-0, E-Prescribe      rOPINIRole (REQUIP) 0.5 MG tablet Take 1 tablet (0.5 mg) by mouth At Bedtime, Disp-30 tablet, R-0, E-Prescribe      sevelamer carbonate (RENVELA) 800 MG tablet Take 1 tablet (800 mg) by mouth 3 times daily (with meals), Disp-90 tablet, R-0, E-Prescribe      tacrolimus (GENERIC EQUIVALENT) 1 MG capsule Take 1 capsule (1 mg) by mouth every 12 hours, Disp-60  capsule, R-0, E-Prescribe      traZODone (DESYREL) 50 MG tablet Take 1 tablet (50 mg) by mouth At Bedtime, Disp-30 tablet, R-0, E-Prescribe         CONTINUE these medications which have CHANGED    Details   apixaban ANTICOAGULANT (ELIQUIS) 5 MG tablet Take 1 tablet (5 mg) by mouth 2 times daily, Disp-60 tablet, R-0, E-Prescribe      carboxymethylcellulose PF (REFRESH PLUS) 0.5 % ophthalmic solution Place 1 drop into both eyes 3 times daily, Disp-50 each, R-1, E-Prescribe      lactulose (CHRONULAC) 10 GM/15ML solution Take 15 mLs (10 g) by mouth 2 times daily, Disp-946 mL, R-0, E-Prescribe      midodrine (PROAMATINE) 10 MG tablet Take 1 tablet (10 mg) by mouth 3 times daily (with meals), Disp-90 tablet, R-0, E-Prescribe      rifaximin (XIFAXAN) 550 MG TABS tablet Take 1 tablet (550 mg) by mouth 2 times daily, Disp-60 tablet, R-0, E-Prescribe         CONTINUE these medications which have NOT CHANGED    Details   !! order for DME Equipment being ordered: Compression knee high stockings for B LE lymphedema [id number] each, R-0, Local Print      !! order for DME Equipment being ordered: Class 2 (30-40mmHg) compression knee high stockings, night time knee high compression alternative, bandaging suppliesDisp-1 each, R-0, Local Print       !! - Potential duplicate medications found. Please discuss with provider.      STOP taking these medications       acetylcysteine (MUCOMYST) 20 % neb solution Comments:   Reason for Stopping:         acetylcysteine (MUCOMYST) 20 % neb solution Comments:   Reason for Stopping:         albuterol (PROVENTIL) (2.5 MG/3ML) 0.083% neb solution Comments:   Reason for Stopping:         calcium carbonate (TUMS) 500 MG chewable tablet Comments:   Reason for Stopping:         famotidine (PEPCID) 20 MG tablet Comments:   Reason for Stopping:         folic acid 5 MG/ML injection Comments:   Reason for Stopping:         glucose 40 % (400 mg/mL) gel Comments:   Reason for Stopping:          hydrocortisone sodium succinate PF (SOLU-CORTEF) 100 MG injection Comments:   Reason for Stopping:         insulin glargine (LANTUS PEN) 100 UNIT/ML pen Comments:   Reason for Stopping:         ipratropium - albuterol 0.5 mg/2.5 mg/3 mL (DUONEB) 0.5-2.5 (3) MG/3ML neb solution Comments:   Reason for Stopping:         ipratropium - albuterol 0.5 mg/2.5 mg/3 mL (DUONEB) 0.5-2.5 (3) MG/3ML neb solution Comments:   Reason for Stopping:         lacosamide (VIMPAT) 10 MG/ML SOLN solution Comments:   Reason for Stopping:         levETIRAcetam (KEPPRA) 100 MG/ML solution Comments:   Reason for Stopping:         menthol (ICY HOT) 5 % PTCH Comments:   Reason for Stopping:         Multiple Vitamins-Minerals (MULTIVITAMINS W/MINERALS) liquid Comments:   Reason for Stopping:         nystatin (MYCOSTATIN) 921418 UNIT/ML suspension Comments:   Reason for Stopping:         ondansetron (ZOFRAN ODT) 4 MG ODT tab Comments:   Reason for Stopping:         oxyCODONE (ROXICODONE) 5 MG tablet Comments:   Reason for Stopping:         pantoprazole (PROTONIX) 2 mg/mL SUSP suspension Comments:   Reason for Stopping:         tacrolimus (GENERIC EQUIVALENT) 1 mg/mL suspension Comments:   Reason for Stopping:             Allergies   No Known Allergies

## 2023-04-28 NOTE — PROGRESS NOTES
[facility]    Nephrology Progress Note    Assessment & Plan     <ESRD    ~Medically stable. Plan is for discharge and dialysis MWF at [facility].     ~I called orders to the unit for chronic dialysis.     - Continue HD MWF. 3 hours, target weight 81kg, establish target weight on discharge. No heparin. -350 to start. OK to start using fistula with one needle, 26 gauge. Successfully cannulated proximally. every day 500, K3 Na140, CO2 32       <ANEMIA OF RENAL DISEASE    ~On epoeitin mirta maximum dose on dialysis. Will transition outpatient to Mircera per unit protocol.    <CKD BMD    ~On sevelamer. BMD tool for protocol at outpatient unit.     <SP LIVER TRANSPLANT    ~One tacrolimus, Rifaxamin for hepatic encephalopathy.     Rainer Fitzpatrick MD  Nephrology  [facility]  Cell:[phone]  On Vocera   Pager:[phone]        .........    Interval History     Seen on dialysis. Feeling well and looking forward to going home. Blood pressure fine with 2kg of UF so far today. Most recent weight was 82.3kg.       Temp: 97.6  F (36.4  C) Temp src: Oral BP: 107/70 Pulse: 65   Resp: 12 SpO2: 94 % O2 Device: None (Room air)      Vitals:    04/20/23 0645 04/24/23 0558 04/26/23 0524   Weight: 80.3 kg (177 lb 1.6 oz) 83.2 kg (183 lb 6.8 oz) 82.3 kg (181 lb 7 oz)       Vital Signs with Ranges    Temp:  [97.6  F (36.4  C)-97.9  F (36.6  C)] 97.6  F (36.4  C)  Pulse:  [60-74] 65  Resp:  [8-37] 12  BP: ()/(60-80) 107/70  SpO2:  [94 %-98 %] 94 %    I/O last 3 completed shifts:  In: 180 [P.O.:180]  Out: -     Physical Exam    BP Readings from Last 5 Encounters:   04/28/23 107/70   01/21/23 120/71   12/29/22 (!) 139/94   12/16/22 101/63   12/16/22 117/75        Wt Readings from Last 10 Encounters:   04/26/23 82.3 kg (181 lb 7 oz)   01/21/23 82.4 kg (181 lb 11.2 oz)   12/28/22 96 kg (211 lb 10.3 oz)   12/16/22 100.2 kg (221 lb)   12/16/22 100.2 kg (221 lb)   12/15/22 87 kg (191 lb 12.8  oz)   10/07/19 137.5 kg (303 lb 3.2 oz)   10/04/19 131.5 kg (290 lb)   02/20/19 140.4 kg (309 lb 9.6 oz)   07/10/18 137.5 kg (303 lb 3.2 oz)     GENERAL: Alert, in no apparent distress.   HEENT:  Normocephalic. No gross abnormalities.  The mouth moist.    Neck The jugular venous pressure is not elevated  CV: RRR  RESP: Breathing unlabored   GI: Abdomen non-distended  MUSCULOSKELETAL: extremities nl - no gross deformities noted. No edema  SKIN: no new lesions or rashes, dry to touch.  NEURO:  No overt deficit.Mental status at baseline.   PSYCH: mood neutral, affect somewhat flat.         Medications       - MEDICATION INSTRUCTIONS -       - MEDICATION INSTRUCTIONS -           - MEDICATION INSTRUCTIONS for Dialysis Patients -   Does not apply See Admin Instructions     apixaban ANTICOAGULANT  5 mg Oral BID     folic acid  1 mg Oral or Feeding Tube Daily     Lacosamide  100 mg Oral Q12H     lactulose  10 g Oral BID     levETIRAcetam  1,000 mg Oral Q24H     lidocaine   Transdermal Q8H BIJU     melatonin  3 mg Oral At Bedtime     menthol   Transdermal Q8H     midodrine  10 mg Oral or Feeding Tube TID w/meals     multivitamin RENAL  1 capsule Oral Daily     OLANZapine zydis  2.5 mg Oral Once per day on Mon Wed Fri     pantoprazole  40 mg Oral QAM AC     ramelteon  8 mg Oral QPM     rifaximin  550 mg Oral or Feeding Tube BID     rOPINIRole  0.5 mg Oral At Bedtime     sevelamer carbonate  800 mg Oral TID w/meals     tacrolimus  1 mg Oral Q12H     traZODone  50 mg Oral At Bedtime       Data     UA RESULTS:  Recent Labs   Lab Test 11/25/22  1859 10/31/17  1038   COLOR Yellow [place]   APPEARANCE Slightly Cloudy* Slightly Cloudy   URINEGLC Negative Negative   URINEBILI Negative Negative   URINEKETONE Negative 5*   SG 1.017 1.021   UBLD Moderate* Negative   URINEPH 5.5 5.0   PROTEIN 70* Negative   NITRITE Negative Negative   LEUKEST Small* Negative   RBCU  --  <1   WBCU  --  1      BMP  Recent Labs   Lab 04/28/23  0800  04/27/23  1022 04/26/23  1253 04/25/23  1113    141 141 141   POTASSIUM 4.4 3.9 3.8 4.2   CHLORIDE 99 101 102 101   KELLY 10.1* 9.9 9.1 9.9   CO2 27 26 30* 27   BUN 60.4* 48.5* 29.4* 49.6*   CR 5.91* 4.90* 3.56* 5.77*   * 115* 132* 117*     Phos@[email or web address](phos:4)  CBC)  Recent Labs   Lab 04/23/23  0812   HGB 8.4*     Lab Results   Component Value Date    ALBUMIN 3.6 04/28/2023    ALBUMIN 3.7 04/27/2023    ALBUMIN 3.8 04/26/2023    ALBUMIN 1.8 12/29/2022    ALBUMIN 1.7 12/28/2022    ALBUMIN 2.1 12/27/2022    ALBUMIN 3.9 04/20/2020    ALBUMIN 3.8 10/07/2019    ALBUMIN 3.8 02/20/2019        Recent Labs   Lab 04/23/23  0812 04/22/23  0821   HGB 8.4*  --    B12  --  989   FOLIC >40.0*  --        No lab results found in last 7 days.    Invalid input(s): BILIRUBININDIRECT  No lab results found in last 7 days.  25 OH Vit D2   Date Value Ref Range Status   09/24/2016 <5 ug/L Final     25 OH Vitamin D2   Date Value Ref Range Status   02/16/2023 <5 ug/L Final     25 OH Vit D3   Date Value Ref Range Status   09/24/2016 8 ug/L Final     25 OH Vitamin D3   Date Value Ref Range Status   02/16/2023 37 ug/L Final     25 OH Vit D total   Date Value Ref Range Status   09/24/2016 (L) 20 - 75 ug/L Final    <13  Season, race, dietary intake, and treatment affect the concentration of   25-hydroxy-Vitamin D. Values may decrease during winter months and increase   during summer months. Values 20-29 ug/L may indicate Vitamin D insufficiency   and values <20 ug/L may indicate Vitamin D deficiency.   This test was developed and its performance characteristics determined by the   [facility],  Special Chemistry Laboratory. It has   not been cleared or approved by the FDA. The laboratory is regulated under CLIA   as qualified to perform high-complexity testing. This test is used for clinical   purposes. It should not be regarded as investigational or for research.       25 OH Vit D Total   Date Value Ref  Range Status   02/16/2023 <42 20 - 75 ug/L Final     Comment:     Season, race, dietary intake, and treatment affect the concentration of 25-hydroxy-Vitamin D. Values may decrease during winter months and increase during summer months. Values 20-29 ug/L may indicate Vitamin D insufficiency and values <20 ug/L may indicate Vitamin D deficiency.     No results for input(s): PTHI in the last 168 hours.    Attestation:   I have reviewed today's relevant vital signs, notes, medications, labs and imaging.

## 2023-04-28 NOTE — TELEPHONE ENCOUNTER
Patient Call: General  Route to LPN    Reason for call: pt being discharged today nurse at [facility] needs call in regards to coordination follow up care    Call back needed? Yes    Return Call Needed  Same as documented in contacts section  When to return call?: Same day: Route High Priority

## 2023-04-28 NOTE — TELEPHONE ENCOUNTER
"Call back to Rishi, the care coordinator at [facility]. Blanco is being discharged to home today.  His hospitalization began post cardiac arrest on 11/12.  He was discharged to [facility] twice but was never there for more than 2 weeks.  He last returned to [facility] 1/21/23 and has been there since. Liver biopsy in December showed cirrhosis.  He is eating, needs thickened fluids. He is able to walk w/ a walker independently.  His speech is \"nonsensical\" and he is impulsive. He is encephalopathic, is on rifaximin.  Had seizure during hospitalization, remains on seizure meds.   Apparently he cannot be discharged to rehab due to med costs.   He is on dialysis MWF at [facility].  Rishi is working on getting him a PCP, currently he is set up to see an internal medicine PA, Ki Carter.    Blanco's family, wife Ofe (who is a surgical nurse at [employer]) and brother Dylan have been very involved in his care.   I told Rishi that we will call Ofe next week to review, said he will need labs every 3 mos, told her I will place an order for follow up w/ Dr. Simms but it could be as late as in August / September.    "

## 2023-04-28 NOTE — PLAN OF CARE
Goal Outcome Evaluation:     Discharge    Patient discharged to home via private car with spouse and brother present.  Care plan note:  patient mostly oriented, confused at times to situations and forgetful to time.  Steady gait but impulsive, walker was provided at discharge and encouraged to use every time he is up.  Continues on a thickened liquid renal diet, some thickner was provided and education with patient and family on free water protocol.  Dietician met with patient and family as well prior to discharge.  Does not need to follow up with liver clinic, patient and family are aware.  Family has information on dialysis times and locations.  AVS reviewed with patient and family.    Listed belongings gathered and given to patient (including from security/pharmacy). Yes  Care Plan and Patient education resolved: Yes  Prescriptions if needed, hard copies sent with patient  filled and given to patient at discharge  Medication Bin checked and emptied on discharge Yes  SW/care coordinator/charge RN aware of discharge: Yes

## 2023-04-28 NOTE — PROGRESS NOTES
CLINICAL NUTRITION SERVICES - BRIEF NOTE    RD consult per family request regarding renal diet.     RD left handouts in pts room yesterday as family was not available:    Low sodium foods and drinks    Foods and drinks low in potassium and phosphorous     Managing your potassium intake    Managing your phosphorous intake    Stage 5 CKD nutrition therapy for people on dialysis    Visited with pt and family today. They have reviewed handouts. Reiterated that Deirdre and their dietitians work with pts very closely and will be of great guidance during this ongoing nutrition process. Until that resource is available on Monday, focusing on adequate protein and low sodium foods would be of priority.       Implementation:  Nutrition education for recommended modifications      Rocio Mccallum  Dietetic Intern

## 2023-04-28 NOTE — PLAN OF CARE
SUMMARY: Acute metabolic encephalopathy with delirium, ESRD on HD  DATE & TIME: 4/27/23 NOC  Cognitive Concerns/ Orientation : A&Ox3; disoriented to situation; Forgetful  BEHAVIOR & AGGRESSION TOOL COLOR: Green  CIWA SCORE: N/A   ABNL VS/O2: BID; VSS on RA  MOBILITY: SBA GB/W  PAIN MANAGMENT: PRN tylenol x1 effective for generalized and shoulder pain  DIET: Renal diet, mildly thick liquid (level 2). Free water protocol: Thin water b/w meals (30 mins before or after)  BOWEL/BLADDER: Continent of bowel (on lactulose), Pt also on HD  ABNL LAB/BG: Cr 4.90  DRAIN/DEVICES: dialysis catheter  TELEMETRY RHYTHM: NA  SKIN: Intact; jaundiced; scattered bruises. Old PEG tube site-CDI. Patient got linear scratch noted on R buttocks cheek after pt used bathroom, cleaned applied meplix to stop bleeding.  TESTS/PROCEDURES: HD M/W/F. XR Video swallow today. PEG tubed removed by IR yesterday.   D/C DATE: home today with out pt dialysis and HC after primary care visit 5/2  Discharge Barriers: Pharmacy medication- discharge pharmacy liaison consulted   OTHER IMPORTANT INFO: nephrology/nutrition/SLP/PT/OT/CC following. Nutrition services to stop by tomorrow with family for education. Bedtime Trazodone being held per family request. Infrequent cough.

## 2023-04-28 NOTE — PROGRESS NOTES
Potassium   Date Value Ref Range Status   04/28/2023 4.4 3.4 - 5.3 mmol/L Final   12/29/2022 3.4 3.4 - 5.3 mmol/L Final   04/20/2020 3.9 3.4 - 5.3 mmol/L Final     Potassium POCT   Date Value Ref Range Status   01/19/2023 3.6 3.5 - 5.0 mmol/L Final     Hemoglobin   Date Value Ref Range Status   04/23/2023 8.4 (L) 13.3 - 17.7 g/dL Final   04/20/2020 14.3 13.3 - 17.7 g/dL Final     Creatinine   Date Value Ref Range Status   04/28/2023 5.91 (H) 0.67 - 1.17 mg/dL Final   04/20/2020 1.06 0.66 - 1.25 mg/dL Final     Urea Nitrogen   Date Value Ref Range Status   04/28/2023 60.4 (H) 6.0 - 20.0 mg/dL Final   12/29/2022 46 (H) 7 - 30 mg/dL Final   04/20/2020 23 7 - 30 mg/dL Final     Sodium   Date Value Ref Range Status   04/28/2023 138 136 - 145 mmol/L Final   04/20/2020 139 133 - 144 mmol/L Final     INR   Date Value Ref Range Status   12/21/2022 1.36 (H) 0.85 - 1.15 Final   10/07/2019 1.20 (H) 0.86 - 1.14 Final       DIALYSIS PROCEDURE NOTE  Hepatitis status of previous patient on machine log was checked and verified ok to use with this patients hepatitis status.  Patient dialyzed for 3 hrs. on a K3 bath with a net fluid removal of  2L.  A BFR of 250 ml/min was obtained via a CVC & AVF using 17 gauge needles.      The treatment plan was discussed with Dr. Fitzpatrick during the treatment.    Total heparin received during the treatment: 0 units.   Needle cannulation sites held x 7 min.   Line flushed, clamped and capped with heparin 1:1000 1.9 mL (1900 units) per lumen    Meds  given: Epo   Complications: none      Person educated: patient. Knowledge base adequate. Barriers to learning: forgetgful. Educated on procedure via verbal mode. Patient/family verbalized understanding. Pt prefers verbal education style.     ICEBOAT? Timeout performed pre-treatment  I: Patient was identified using 2 identifiers  C:  Consent Signed Yes  E: Equipment preventative maintenance is current and dialysis delivery system OK to use  B: Hepatitis  B Surface Antigen: Neg; Draw Date: 4/24/23      Hepatitis B Surface Antibody: Sara; Draw Date: 4/24/23  O: Dialysis orders present and complete prior to treatment  A: Vascular access verified and assessed prior to treatment  T: Treatment was performed at a clinically appropriate time  ?: Patient was allowed to ask questions and address concerns prior to treatment  See Adult Hemodialysis flowsheet in EPIC for further details and post assessment.  Machine water alarm in place and functioning. Transducer pods intact and checked every 15min.   Pt returned via bed.  Chlorine/Chloramine water system checked every 4 hours.  Outpatient Dialysis at [facility]    Patient repositioned every 2 hours during the treatment.  Post treatment report given to PETER Maldonado regarding 2L of fluid removed, last BP of 104/74, and patient pain rating of 0/10.     Please remove patient dressing on AVF and AVG needle sites 24 hours after dialysis. If leaking occurs please apply a Band-Aid.

## 2023-04-29 NOTE — PLAN OF CARE
Physical Therapy Discharge Summary    Reason for therapy discharge:    Discharged to home with home therapy.    Progress towards therapy goal(s). See goals on Care Plan in [facility] electronic health record for goal details.  Goals not met.  Barriers to achieving goals:   discharge from facility.    Therapy recommendation(s):    Continued therapy is recommended.  Rationale/Recommendations:  TCU recommended, pt DCd to home with family and HHPT .

## 2023-05-01 ENCOUNTER — TELEPHONE (OUTPATIENT)
Dept: TRANSPLANT | Facility: CLINIC | Age: 58
End: 2023-05-01
Payer: COMMERCIAL

## 2023-05-01 DIAGNOSIS — I48.0 PAF (PAROXYSMAL ATRIAL FIBRILLATION) (H): Primary | ICD-10-CM

## 2023-05-01 NOTE — TELEPHONE ENCOUNTER
Call to Ofe to see how Blanco is doing. Ofe has a lot of questions.  She is overwhelmed by all the meds Blanco is on and she would really like to get him off some of these.    He has been taking both xifaxin and lactulose.  I spoke to Dr. Simms.  We will start by stopping the xifaxin, if Ofe doesn't notice a change we will stop lactulose approx a week later, if no change will stop this also.  Blanco will see his internist tomorrow and they will review meds. He will need evaluation to see if he will need to continue seizure meds, he will also be seeing a cardiologist.  She has been getting a lot of support from Blanco's brother Dylan.    We will be in touch.

## 2023-05-02 ENCOUNTER — OFFICE VISIT (OUTPATIENT)
Dept: FAMILY MEDICINE | Facility: CLINIC | Age: 58
End: 2023-05-02
Payer: COMMERCIAL

## 2023-05-02 VITALS
DIASTOLIC BLOOD PRESSURE: 68 MMHG | RESPIRATION RATE: 16 BRPM | WEIGHT: 184 LBS | OXYGEN SATURATION: 99 % | SYSTOLIC BLOOD PRESSURE: 107 MMHG | BODY MASS INDEX: 24.95 KG/M2 | HEART RATE: 68 BPM

## 2023-05-02 DIAGNOSIS — Z87.898 HISTORY OF SEIZURE: ICD-10-CM

## 2023-05-02 DIAGNOSIS — Z94.4 LIVER TRANSPLANTED (H): ICD-10-CM

## 2023-05-02 DIAGNOSIS — N18.6 ESRD (END STAGE RENAL DISEASE) ON DIALYSIS (H): ICD-10-CM

## 2023-05-02 DIAGNOSIS — Z99.2 ESRD (END STAGE RENAL DISEASE) ON DIALYSIS (H): ICD-10-CM

## 2023-05-02 DIAGNOSIS — Z09 HOSPITAL DISCHARGE FOLLOW-UP: Primary | ICD-10-CM

## 2023-05-02 PROCEDURE — 99496 TRANSJ CARE MGMT HIGH F2F 7D: CPT | Performed by: PHYSICIAN ASSISTANT

## 2023-05-02 ASSESSMENT — PAIN SCALES - GENERAL: PAINLEVEL: SEVERE PAIN (7)

## 2023-05-02 NOTE — PROGRESS NOTES
Assessment & Plan     Hospital discharge follow-up  Referral to home care placed for PT, OT, and speech therapy as well as a at home nursing evaluation for further needs.  Additionally, referral placed to [facility].  Plan to follow-up in 2 months.  Follow-up sooner if needed.  Provided patient with a handicap parking pass.  - Home Care Referral    ESRD (end stage renal disease) on dialysis (H)  Close follow-up with dialysis/nephrology team.  - Albumin Random Urine Quantitative with Creat Ratio  - Med Therapy Management Referral    Liver transplanted (H)  See recommendations from liver transplant team in regards to discontinuation of these medications.  - Med Therapy Management Referral    History of seizure  Referral placed to neurology for further recommendations regards to antiseizure medications.  Complex hospitalization including hypoxic event along with baseline multifactorial encephalopathy due to critical illness/prior cardiac arrest/prior strokes.  Would slowly taper liver medications first before starting to lower these.  - Adult Neurology  Referral      30 minutes spent by me on the date of the encounter doing chart review, review of test results, interpretation of tests, patient visit, documentation and discussion with family      MED REC REQUIRED  Post Medication Reconciliation Status: discharge medications reconciled and changed, per note/orders  BMI:   Estimated body mass index is 24.95 kg/m  as calculated from the following:    Height as of 11/12/22: 1.829 m (6').    Weight as of this encounter: 83.5 kg (184 lb).   Weight management plan: Discussed healthy diet and exercise guidelines    The likelihood of other entities in the differential is insufficient to justify any further testing for them at this time. This was explained to the patient. The patient was advised that persistent or worsening symptoms would require further evaluation. Patient advised to call the office and if unable to reach to  go to the emergency room if they develop any new or worsening symptoms. Expressed understanding and agreement with above stated plan.       Ki Carter PA-C  [facility] M[facility]HE Simeon is a 57 year old, presenting for the following health issues:  Hospital F/U    Here today with his wife who is an OR nurse for hospital discharge follow-up.  Significant hospitalization.  See hospital course below.    Doing well since returning home.  Appetite stable.  Bowels moving regularly.  Dialysis Monday, Wednesday, and Friday.  Ongoing follow-up with his liver specialist at the [facility].  Upcoming cardiology appointment.    Has not been receiving any services at home currently.  They are hopeful to decrease his med list.  Call yesterday to his liver specialist's nurse who plan to stop Xifaxin as well as Lactulose.  Additionally, hope to review need for antiseizure medication.    Hospital Follow-up Visit:    Hospital/Nursing Home/IP Rehab Facility: [facility]  Date of Admission: 1/21/23  Date of Discharge: 4/28/23  Reason(s) for Admission: Encephalopathy    Was your hospitalization related to COVID-19? No   Problems taking medications regularly:  None  Medication changes since discharge: Too many  Problems adhering to non-medication therapy:  None    Summary of hospitalization:  [facility] discharge summary reviewed  Diagnostic Tests/Treatments reviewed.  Follow up needed: Homecare, cardiology, neuro, dialysis team.   Other Healthcare Providers Involved in Patient s Care:         Homecare  Update since discharge: improved.         Plan of care communicated with patient and family           Hospital Course  Blanco Osborne is a 57 year-old male admitted on 1/21/2023 as a transfer from [facility] for evaluation of loculated pleural effusion. He has extensive PMH including h/o liver transplant 2016 due to alcohol abuse, pAF on chronic  anticoagulation, history of diabetes mellitus type 2, CVA, hypertension, CHRISTIAN on BiPAP.  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 He was recovering in LTACH and was transferred to [facility] for consideration of chest tube placement and possible intrapleural lysis for worsening effusion     1. Acute metabolic encephalopathy with delirium, multifactorial, -resolving and now more chronic, hepatic encephalopathy  Chronic liver disease, history of liver transplant 2016  ESRD on HD  History of seizure, on Keppra and Vimpat     Continuing to have waxing and waning mentation,  Earlier in hospital stay, remained confused and had pulled out tracheostomy tube, PICC line and pulled his dialysis catheter- replaced.    Mental status continues to fluctuate with periods of alertness and increased sleepiness but without agitation for several days and overall improving.  Off restraints and sitter for several days.    Complicated, prolonged hospital course with mental status concerns multifactorial related to hepatic encephalopathy, end-stage renal disease on dialysis, past PEA arrest, seizure disorder, medication and chronic liver disease with prior liver transplant.     Continue to reorient and redirect as able. Maintain normal day/night cycles and sleep-wake cycles.  Minimize sedating medications as able.  Remains weak and deconditioned with complex, prolonged hospital course and limited mobility.  Encouragement and support offered daily to patient.    Continue Lactulose to 10 mg BID, monitor for symptoms; goal to have 2-3 bowel movements per day.    Ongoing hemodialysis, as per nephrology, 3X/week dialysis schedule. Nephrology following    Continue ramelteon 8 mg at bedtime; monitor for side effects including AM sedation,  reassess daily.    Started on olanzapine 5 mg BID and PRN earlier during this hospitalization. Delirium improved/resolved. Now scheduled HS dose discontinued. Plan to stop the doses with HD as well in upcoming days. Psychiatry recommended  trazodone 25 mg at bedtime, increased to 50 mg. Melatonin as well added.     Continue Keppra and Vimpat. No recurrent seizures reported of recent.     Neurology consult appreciated.  Patient has been started on ropinirole for restless leg syndrome.     Family stating that he is doing well with weaning down on zyprexa  -----currently receiving doses 3x's/week     2. Bilateral pleural effusions   Acute respiratory failure requiring tracheostomy  - resolved    Pneumonia  - resolved   ARDS  - resolved    Right pneumothorax - resolved   *Initial event was parainfluenza and strep pneumo pneumonia back in November 2022. Treated for ARDS. Had failed extubation x2 and tracheostomy performed on previous hospitalization. *Had additional complications of bilateral pneumothoraces as well as hydropneumothorax- pleural fluid grew candida parapsilosis  *Completed 4-week antifungal treatment. [facility].  *Recently there were concern for worsening effusion while at [facility] and had thoracentesis 01/13 which returned exudative without growth on cultures. CT chest/abdomen/pelvis on 01/18 demonstrated worsening effusions and concerns for infected parapneumonic effusions with possible SBP.  Multiple pulmonologist at [facility] reviewed CT scan recommended transfer to [facility] for consideration of chest tube placement and possible intrapleural lysis  *Thoracic surgery (Dr. Jackson) consulted during admission. CT chest small effusions on imaging and so chest tube was not inserted.   * ID and pulmonary as well consulted this stay and have since signed off. No abx.   *Patient pulled out his tracheostomy tube on the night 2/1-2/2 and is tolerating well without  "increased shortness of breath persistent cough or worsening hypoxia.     Stable on room air now    Encourage incentive spirometry as able, up to chair, deep breathing.    Tracheostomy site has healed now     3.  Possible splenic infract  Wedge shaped area on CT scan chest and CT abdomen on 3/7/23.    Hematology consulted, suspicion for infarct was low.    TTE no thrombus or vegetations.     4. S/p liver transplant 2016   Chronic immunosuppression, on tacrolimus  Cirrhosis with ascites  Subacute bacterial peritonitis (SBP), resolved  *Main manifestations of this are hepatic encephalopathy and ascites.  Hepatologist at [facility] felt that most likely reason for the cirrhosis was metabolic syndrome or alcohol intake.  There was no evidence of rejection on biopsy and there was some evidence of regeneration. Paracentesis done on 12/22/2022 showed lactobacillus indicating SBP, treated with Unasyn and Augmentin, finished 2-week course 1/12/2023.  Requires large-volume paracentesis periodically for recurring ascites.    *Paracentesis 1/13/2023 yielded about 7500 cc. Cultures NGTD. paracentesis on 1/24 with 4.8 L fluid removal  Paracentesis on 3/6/23 No SBP    CT abdomen 3/9 small to moderate ascites. Continue intermittent paracentesis as needed if develops symptomatic ascites.    Monitor for signs and symptoms of recurrent spontaneous bacterial peritonitis.    Further treatment for recurrent ascites with TIPS deferred to his Liver Transplant team and/or Hepatology, he had an appointment on 4/21/2023 with Hepatology, Dr. Simms but needs to reschedule given ongoing hosptial stay.    Continue Tacrolimus 1 mg BID, rifaximin 550 mg BID.    Continue lactulose as ordered, titrate as needed to have 2-3 BMs    Follow up in hepatology clinic      5. Goals of care   As per prior rounding provider, \"on 1/25 nursing had mentioned that patient had refused to undergo dialysis and want to stop care, palliative care was consulted.  Patient " "and his spouse denied wanting to stop care and wanted ongoing restorative care including full code\"    Full Code status.     6. Diarrhea, improved, on lactulose for chronic liver disease    C difficile negative on 1/31. Secondary to lactulose. Discontinued rectal tube (2/12) as stools more formed.    Continue lactulose with goal of 2 to 3 soft bowel movement in 24 hours.     7. Paroxysmal atrial fibrillation  Chronic anticoagulation, apixaban  History of embolic strokes  Remains in sinus rhythm, on anticoagulation with apixaban indefinitely for stroke prophylaxis.      Continue apixaban anticoagulation, it was briefly held for fistula placement.    Follow up with cardiologist      8. Acute anemia, normocytic  Patient has required intermittent transfusions for on-going anemia.  Anemia most likely secondary to critical illness/chronic disease, chronic renal disease.  Baseline hemoglobin around 7-8.  Likely Epo resistance.    Hemoglobin fairly stable now between 8-9    Last hgb 8.9 (4/14/23)    On daily folate supplements    Follow up with chest pain and nephrologist      9. Hypercalcemia  Work-up shows low/low normal vitamin D, suppressed PTH.  But PTH related peptide is elevated.    Nephrology following and considered prolia, however patient's wife would like to hold off for now.    Holding VIT D and Phoslo.     Discussed with Dr Moulton regarding elevated PTHrP, suspected due to prolonged immobility. Plan is to interval follow up in a month or so. If trends up, then will plan forther work up.      10. History PEA arrest   PEA arrest prior to his previous admission and 1 episode of PEA associated with desaturation on 12/20.  No structural cardiac disease.     Follow up with cardiology      11. Chronic Hypotension  In the past has developed baseline hypotension with cirrhosis and prolonged critical illness.  On hemodialysis.  Receiving midodrine and albumin during dialysis.    Continue midodrine       12. History of " seizure   After hypoxic event, in the context of baseline multifactorial encephalopathy secondary to his ongoing critical illness and prior cardiac arrests and prior strokes and cirrhosis with hepatic encephalopathy. MRI of head of 12/20/22 reveals no PRES or leptomeningeal enhancement but scattered.  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   No recent seizure activity.    Continue levetiracetam, lacosamide.    Seizure precautions.    Outpatient follow-up with neurology      13. ESRD  Anemia due to renal disease  Hypotension during dialysis  Hyperphosphatemia    Nephrology reviewing vein mapping to assess options for internal access placement for dialysis, see note 3/16.    Underwent Fistula placement on  3/21/22.    Vascular surgery following-continue to use for tunnel catheter.  Outpatient follow-up with vascular surgery and for will need a follow-up ultrasound of the fistula in 6 weeks to assess patency.    Started sevalmer 800mg TID with meals on 3/27/23.    Complained of cramping with dialysis. Tried a dose of oxycodone prior to dialysis but this was not helpful. Spouse prefers to avoid narcotic given encephalopathy.      Vitamin  B12 level within normal  limits     14. Diabetes mellitus type 2  *Hemoglobin A1c on November 2022 was 4.7  *By report, on Lantus insulin in the past.  Blood sugar checks and sliding scale insulin previously discontinued as has been stable without insulin needs.    Follow up with primary care provider      15. Hypomagnesemia    Resolved with replacement.     16. Severe malnutrition, protein and calorie type  Moderate dysphagia  G-tube feedings    Continue with tube feeds via PEG tube.    IR replaced the PEG tube on 2/8/2023, monitor.    Nutritionist consulted and following for tube feeds.    SLP following as well. Oral diet as per speech and language therapy (SLP). Patient hopeful to advance to thin liquids soon.    2/20  Nutrition following for tube feeds.    Now undergoing PRN tube feeds.    Encourage high-protein diet.    Nutrition notes reviewed, patient eating 0 to 100% of meals.  Continue current interventions.  As needed tube feed boluses available if patient consumes less than 50% of the meal.    Family encouraging patient to eat high protein diet including protein bars    PEG removed this admission      17. Anxiety  Fluoxetine was discontinued as per psychiatry recommendation on 2/2 (see consult note for details).     18. Restless leg  Syndrome    Patient started on ropiranole by neurology.      Review of Systems   Constitutional, HEENT, cardiovascular, pulmonary, GI, , musculoskeletal, neuro, skin, endocrine and psych systems are negative, except as otherwise noted.      Objective    /68 (BP Location: Right arm, Patient Position: Sitting, Cuff Size: Adult Regular)   Pulse 68   Resp 16   Wt 83.5 kg (184 lb)   SpO2 99%   BMI 24.95 kg/m    Body mass index is 24.95 kg/m .     No Known Allergies  Current Outpatient Medications   Medication Sig Dispense Refill     apixaban ANTICOAGULANT (ELIQUIS) 5 MG tablet Take 1 tablet (5 mg) by mouth 2 times daily 60 tablet 0     folic acid (FOLVITE) 1 MG tablet Take 1 tablet (1 mg) by mouth daily 30 tablet 0     Lacosamide (VIMPAT) 100 MG TABS tablet Take 1 tablet (100 mg) by mouth every 12 hours 60 tablet 0     lactulose (CHRONULAC) 10 GM/15ML solution Take 15 mLs (10 g) by mouth 2 times daily 946 mL 0     levETIRAcetam (KEPPRA) 1000 MG tablet Take 1 tablet (1,000 mg) by mouth every 24 hours 30 tablet 0     melatonin 3 MG tablet Take 1 tablet (3 mg) by mouth At Bedtime 30 tablet 0     midodrine (PROAMATINE) 10 MG tablet Take 1 tablet (10 mg) by mouth 3 times daily (with meals) 90 tablet 0     multivitamin RENAL (TRIPHROCAPS) 1 MG capsule Take 1 capsule by mouth daily 30 capsule 0     nitroGLYcerin (NITROSTAT) 0.4 MG sublingual tablet For chest pain place 1 tablet under the  tongue every 5 minutes for 3 doses. If symptoms persist 5 minutes after 1st dose call 911. 15 tablet 0     pantoprazole (PROTONIX) 40 MG EC tablet Take 1 tablet (40 mg) by mouth every morning (before breakfast) 30 tablet 0     ramelteon (ROZEREM) 8 MG tablet Take 1 tablet (8 mg) by mouth every evening 30 tablet 0     rifaximin (XIFAXAN) 550 MG TABS tablet Take 1 tablet (550 mg) by mouth 2 times daily 60 tablet 0     rOPINIRole (REQUIP) 0.5 MG tablet Take 1 tablet (0.5 mg) by mouth At Bedtime 30 tablet 0     sevelamer carbonate (RENVELA) 800 MG tablet Take 1 tablet (800 mg) by mouth 3 times daily (with meals) 90 tablet 0     tacrolimus (GENERIC EQUIVALENT) 1 MG capsule Take 1 capsule (1 mg) by mouth every 12 hours 60 capsule 0     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     Cirrhosis of liver with ascites (H) 2/11/2016     H/O alcohol abuse      HTN (hypertension)      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     NAFLD (nonalcoholic fatty liver disease) 2/11/2016     CHRISTIAN on CPAP      Parainfluenza type 1 infection 11/19/2022     SBP (spontaneous bacterial peritonitis) (H)     [facility]     Sepsis due to Streptococcus pneumoniae with acute hypoxic respiratory failure (H) 11/19/2022     Tubular adenoma 10/2019    Large cecal adenoma- due for surveillance colonoscopy in 3 years (10/2022)     Past Surgical History:   Procedure Laterality Date     APPENDECTOMY       BENCH LIVER N/A 9/20/2016    Procedure: BENCH LIVER;  Surgeon: Enoc Crews MD;  Location: [facility] OR     BRONCHOSCOPY FLEXIBLE AND RIGID N/A 12/20/2022    Procedure: BRONCHOSCOPY;  Surgeon: Alena Valenzuela MD;  Location:  GI     COLONOSCOPY  8/6/13    repeat in 2018     COLONOSCOPY N/A 10/4/2019    Procedure: COLONOSCOPY, WITH POLYPECTOMY AND BIOPSY;  Surgeon: Go Chong MD;  Location: [facility] OR     CREATE FISTULA ARTERIOVENOUS UPPER  EXTREMITY Left 3/21/2023    Procedure: LEFT UPPER EXTREMITY ARTERIOVENOUS FISTULA CREATION. LIGATION OF COMPETING VASCULAR BRANCHES- LEFT;  Surgeon: Deejay Mcneil MD;  Location:  OR     HERNIA REPAIR       IR CHEST TUBE PLACEMENT NON-TUNNELED RIGHT  2022     IR CVC TUNNEL PLACEMENT > 5 YRS OF AGE  2022     IR GASTROSTOMY TUBE CHANGE  2023     IR GASTROSTOMY TUBE PERCUTANEOUS PLCMNT  2022     IR PARACENTESIS  2022     IR PARACENTESIS  2022     IR THORACENTESIS  2022     IR TRANSCATHETER BIOPSY  2022     TRACHEOSTOMY N/A 2022    Procedure: TRACHEOSTOMY;  Surgeon: Nilesh Jackson MD;  Location:  OR     TRANSPLANT LIVER RECIPIENT  DONOR N/A 2016    Procedure: TRANSPLANT LIVER RECIPIENT  DONOR;  Surgeon: Enoc Crews MD;  Location: [facility] OR       Physical Exam   GENERAL: healthy, alert and no distress  EYES: Eyes grossly normal to inspection, PERRL and conjunctivae and sclerae normal  RESP: lungs clear to auscultation - no rales, rhonchi or wheezes  CV: regular rate and rhythm, normal S1 S2, no S3 or S4, no murmur, click or rub  ABDOMEN: soft, nontender, no masses  MS: no gross musculoskeletal defects noted, no edema  SKIN: no suspicious lesions or rashes  NEURO: Normal strength and tone, mentation intact and speech normal.  Ambulating appropriately with walker assist.  PSYCH: mentation appears normal, affect normal/bright

## 2023-05-04 ENCOUNTER — OFFICE VISIT (OUTPATIENT)
Dept: CARDIOLOGY | Facility: CLINIC | Age: 58
End: 2023-05-04
Attending: HOSPITALIST
Payer: COMMERCIAL

## 2023-05-04 ENCOUNTER — TELEPHONE (OUTPATIENT)
Dept: FAMILY MEDICINE | Facility: CLINIC | Age: 58
End: 2023-05-04

## 2023-05-04 VITALS
DIASTOLIC BLOOD PRESSURE: 68 MMHG | HEIGHT: 72 IN | HEART RATE: 65 BPM | BODY MASS INDEX: 25.73 KG/M2 | WEIGHT: 190 LBS | SYSTOLIC BLOOD PRESSURE: 104 MMHG | OXYGEN SATURATION: 100 %

## 2023-05-04 DIAGNOSIS — K70.30 ALCOHOLIC CIRRHOSIS OF LIVER WITHOUT ASCITES (H): ICD-10-CM

## 2023-05-04 DIAGNOSIS — I48.0 PAF (PAROXYSMAL ATRIAL FIBRILLATION) (H): ICD-10-CM

## 2023-05-04 DIAGNOSIS — I46.9 PEA (PULSELESS ELECTRICAL ACTIVITY) (H): Primary | ICD-10-CM

## 2023-05-04 DIAGNOSIS — N18.6 ESRD (END STAGE RENAL DISEASE) ON DIALYSIS (H): ICD-10-CM

## 2023-05-04 DIAGNOSIS — Z99.2 ESRD (END STAGE RENAL DISEASE) ON DIALYSIS (H): ICD-10-CM

## 2023-05-04 PROCEDURE — 93000 ELECTROCARDIOGRAM COMPLETE: CPT | Performed by: INTERNAL MEDICINE

## 2023-05-04 PROCEDURE — 99205 OFFICE O/P NEW HI 60 MIN: CPT | Performed by: INTERNAL MEDICINE

## 2023-05-04 NOTE — Clinical Note
5/4/2023    Ki Carter PA-C  [address]  [place] [address]    RE: Blanco Osborne       Dear Colleague,     I had the pleasure of seeing Blanco CENTENO Dylon in the [facility].    SERVICE DATE:     REFERRING PROVIDER:  Jack Felix MD  [address] [place] [address]    PRIMARY CARE PROVIDER:  Ki Carter  [address]  [place] [address]    REASON FOR VISIT:      HISTORY OF PRESENT ILLNESS:      DIAGNOSES/ASSESSMENT:      PLAN:  1.      2.        3.  Patient education and counseling:       Kristina Abreu MD    New patient.  Established patient.   Today's clinic visit entailed:  {[facility] 2021 Documentation (Optional):196087}  {2021 E&M time:651555}  Provider  Link to [facility] Help Grid     {[facility] Level:627172}          PHYSICAL EXAMINATION:  Vitals: /68   Pulse 65   Ht 1.829 m (6')   Wt 86.2 kg (190 lb)   SpO2 100%   BMI 25.77 kg/m    Wt Readings from Last 5 Encounters:   05/04/23 86.2 kg (190 lb)   05/02/23 83.5 kg (184 lb)   04/26/23 82.3 kg (181 lb 7 oz)   01/21/23 82.4 kg (181 lb 11.2 oz)   12/28/22 96 kg (211 lb 10.3 oz)     CARDIOVASCULAR:  Regular heart sounds.  No murmur. No carotid bruit.  RESPIRATORY:  Normal breath sounds. No rales or wheeze.  EXTREMITIES:  No edema.    Encounter Diagnoses   Name Primary?     PEA (Pulseless electrical activity) (H)      PAF (paroxysmal atrial fibrillation) (H)          No orders of the defined types were placed in this encounter.          CURRENT MEDICATIONS:  Current Outpatient Medications   Medication Sig Dispense Refill     apixaban ANTICOAGULANT (ELIQUIS) 5 MG tablet Take 1 tablet (5 mg) by mouth 2 times daily 60 tablet 0     folic acid (FOLVITE) 1 MG tablet Take 1 tablet (1 mg) by mouth daily 30 tablet 0     Lacosamide (VIMPAT) 100 MG TABS tablet Take 1 tablet (100 mg) by mouth every 12 hours 60 tablet 0     lactulose (CHRONULAC) 10 GM/15ML solution Take 15 mLs (10 g) by mouth 2 times daily  946 mL 0     levETIRAcetam (KEPPRA) 1000 MG tablet Take 1 tablet (1,000 mg) by mouth every 24 hours 30 tablet 0     melatonin 3 MG tablet Take 1 tablet (3 mg) by mouth At Bedtime 30 tablet 0     midodrine (PROAMATINE) 10 MG tablet Take 1 tablet (10 mg) by mouth 3 times daily (with meals) 90 tablet 0     multivitamin RENAL (TRIPHROCAPS) 1 MG capsule Take 1 capsule by mouth daily 30 capsule 0     nitroGLYcerin (NITROSTAT) 0.4 MG sublingual tablet For chest pain place 1 tablet under the tongue every 5 minutes for 3 doses. If symptoms persist 5 minutes after 1st dose call 911. 15 tablet 0     pantoprazole (PROTONIX) 40 MG EC tablet Take 1 tablet (40 mg) by mouth every morning (before breakfast) 30 tablet 0     ramelteon (ROZEREM) 8 MG tablet Take 1 tablet (8 mg) by mouth every evening 30 tablet 0     rifaximin (XIFAXAN) 550 MG TABS tablet Take 1 tablet (550 mg) by mouth 2 times daily 60 tablet 0     rOPINIRole (REQUIP) 0.5 MG tablet Take 1 tablet (0.5 mg) by mouth At Bedtime 30 tablet 0     sevelamer carbonate (RENVELA) 800 MG tablet Take 1 tablet (800 mg) by mouth 3 times daily (with meals) 90 tablet 0     tacrolimus (GENERIC EQUIVALENT) 1 MG capsule Take 1 capsule (1 mg) by mouth every 12 hours 60 capsule 0         ALLERGIES:  No Known Allergies    PAST MEDICAL HISTORY:    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     Cirrhosis of liver with ascites (H) 2/11/2016     H/O alcohol abuse      HTN (hypertension)      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     NAFLD (nonalcoholic fatty liver disease) 2/11/2016     CHRISTIAN on CPAP      Parainfluenza type 1 infection 11/19/2022     SBP (spontaneous bacterial peritonitis) (H)     [facility]     Sepsis due to Streptococcus pneumoniae with acute hypoxic respiratory failure (H) 11/19/2022     Tubular adenoma 10/2019    Large cecal adenoma- due for surveillance colonoscopy in  3 years (10/2022)       PAST SURGICAL HISTORY:    Past Surgical History:   Procedure Laterality Date     APPENDECTOMY       BENCH LIVER N/A 2016    Procedure: BENCH LIVER;  Surgeon: Enoc Crews MD;  [facility]: [facility] OR     BRONCHOSCOPY FLEXIBLE AND RIGID N/A 2022    Procedure: BRONCHOSCOPY;  Surgeon: Alena Valenzuela MD;  Location:  GI     COLONOSCOPY  13    repeat in 2018     COLONOSCOPY N/A 10/4/2019    Procedure: COLONOSCOPY, WITH POLYPECTOMY AND BIOPSY;  Surgeon: Go Chong MD;  [facility]     CREATE FISTULA ARTERIOVENOUS UPPER EXTREMITY Left 3/21/2023    Procedure: LEFT UPPER EXTREMITY ARTERIOVENOUS FISTULA CREATION. LIGATION OF COMPETING VASCULAR BRANCHES- LEFT;  Surgeon: Deejay Mcneil MD;  Location:  OR     HERNIA REPAIR       IR CHEST TUBE PLACEMENT NON-TUNNELED RIGHT  2022     IR CVC TUNNEL PLACEMENT > 5 YRS OF AGE  2022     IR GASTROSTOMY TUBE CHANGE  2023     IR GASTROSTOMY TUBE PERCUTANEOUS PLCMNT  2022     IR PARACENTESIS  2022     IR PARACENTESIS  2022     IR THORACENTESIS  2022     IR TRANSCATHETER BIOPSY  2022     TRACHEOSTOMY N/A 2022    Procedure: TRACHEOSTOMY;  Surgeon: Nilesh Jackson MD;  Location:  OR     TRANSPLANT LIVER RECIPIENT  DONOR N/A 2016    Procedure: TRANSPLANT LIVER RECIPIENT  DONOR;  Surgeon: Enoc Crews MD;  Location:  OR       FAMILY HISTORY:    No family history on file.    SOCIAL HISTORY:    Social History     Socioeconomic History     Marital status:    Tobacco Use     Smoking status: Never     Smokeless tobacco: Never   Vaping Use     Vaping status: Never Used   Substance and Sexual Activity     Alcohol use: Not Currently     Alcohol/week: 0.0 standard drinks of alcohol     Drug use: No                           Thank you for allowing me to participate in the care of your patient.      Sincerely,     Kristina Abreu MD      [facility]  cc:   Jack Felix MD  [address] [address]

## 2023-05-04 NOTE — TELEPHONE ENCOUNTER
MTM referral from: [facility] visit (referral by provider)    MTM referral outreach attempt #2 on May 4, 2023 at 9:48 AM      Outcome: Patient not reachable after several attempts, will route to MTM Pharmacist/Provider as an FYI.  [facility] scheduling number is [phone].  Thank you for the referral.    Gunjan Laurent - [facility]

## 2023-05-04 NOTE — PROGRESS NOTES
SERVICE DATE: 5/4/2023    REFERRING PROVIDER:  Jack Felix MD  [address] [place] [address]    PRIMARY CARE PROVIDER:  Ki Carter  [address]  [place] [address]    REASON FOR VISIT:  Paroxysmal atrial fibrillation noted during the course of complicated and prolonged hospitalization of 3 months duration from 1/21-4/28/2023.    HISTORY OF PRESENT ILLNESS:  Blanco is new to my practice.  In the past, he has been seen by Dr. Morales at [facility] cardiology in 2016.    He has been referred to cardiology following recent hospitalization where he was noted to have paroxysmal atrial fibrillation and appropriately started on anticoagulation for stroke prophylaxis.    He was hospitalized for 3 months from 1/21-4/28/2023 at [facility].  It took me 45 minutes to review all his records, imaging, serial notes, the episode of paroxysmal atrial fibrillation that led to the referral.    Patient is 57 years old with alcoholic cirrhosis status post liver transplant in 2016, type 2 diabetes, chronic immunosuppression, PAF, previous stroke, hypertension, seizure disorder, obstructive sleep apnea on BiPAP following respiratory arrest in November 2022 which ended with end-stage renal disease needing dialysis.  He was recovering in long-term care at [facility] and transferred to [facility] on 1/21/2023 for evaluation of loculated pleural effusion.  During this he had acute metabolic encephalopathy with delirium, chronic hepatic encephalopathy, bilateral pleural effusions and acute respiratory failure requiring tracheostomy, ARDS, pneumonia.  He had multispecialty consultation including thoracic surgery, infectious diseases, pulmonology, neurology.  He was treated with antifungal therapy.  Had subacute bacterial peritonitis that resolved.    During all of this he had episodes of paroxysmal atrial fibrillation.  But he is chronically anticoagulated on apixaban and currently in  sinus rhythm at 65 bpm.  He cannot tolerate rate controlling medication such as beta-blocker due to low resting blood pressure in the 100s systolic.    His last echocardiogram in March 2023 showed normal LVEF, no significant valve disease, no obvious vegetations, small posterior pericardial effusion without tamponade.    Reviewed extensive labs.  He is chronically anemic with a hemoglobin of 8.4, has end-stage renal disease with a BUN of 60, creatinine 5.9, normal sodium and potassium, LDL is 41, albumin is 3.6 (low), no recent liver enzymes on system which is surprising.    ECG shows sinus rhythm of 67 bpm with normal cardiac intervals and a QTc of 404 ms.    DIAGNOSES/ASSESSMENT:  1.  Paroxysmal atrial fibrillation.  Currently in sinus rhythm.  Appropriately anticoagulated with apixaban.  Not a candidate for AV slowing drugs due to resting hypotension.  2.  Complex alcoholic liver cirrhosis, status post liver transplant in 2016, chronically immunosuppressed.  3.  End-stage renal disease, dialysis.  4.  Type 2 diabetes mellitus.  5.  Significant physical debility secondary to recent prolonged 3-month hospitalization for respiratory failure.    PLAN:  1.  Patient's primary issues are non-cardiac.  Not surprisingly, due to his prolonged complex hospitalization he had episodes of paroxysmal atrial fibrillation.  Currently in sinus rhythm.  Last echocardiogram shows preserved ventricular function, no significant valve disease.  His small pericardial effusion is likely due to the chronic liver disease and is not of clinical significance.    2.  No medication changes at this time.  Continue Eliquis.  If he gets GI bleeds from his liver disease, he will be referred to the heart rhythm team to consider Watchman device but for now he is doing well on anticoagulation.    3.  Echocardiogram, heart monitor and follow-up with HARRY in 4 months.  If issues are stable at that time, refer back to primary team and see cardiology, as  needed.      Kristina Abreu MD    New patient.  Very complex.  80 minutes spent by me on the date of the encounter doing chart review, history and exam, documentation and further activities per the note.        PHYSICAL EXAMINATION:  Vitals: /68   Pulse 65   Ht 1.829 m (6')   Wt 86.2 kg (190 lb)   SpO2 100%   BMI 25.77 kg/m    Wt Readings from Last 5 Encounters:   06/05/23 82.1 kg (181 lb)   05/30/23 84.5 kg (186 lb 4.8 oz)   05/04/23 86.2 kg (190 lb)   05/02/23 83.5 kg (184 lb)   04/26/23 82.3 kg (181 lb 7 oz)     CARDIOVASCULAR:  Regular heart sounds.  Soft systolic.  Murmur. No carotid bruit.  EXTREMITIES: Mild bilateral lower extremity edema.    Encounter Diagnoses   Name Primary?    PEA (Pulseless electrical activity) (H) Yes    PAF (paroxysmal atrial fibrillation) (H)     Alcoholic cirrhosis of liver without ascites (H)     ESRD (end stage renal disease) on dialysis (H)          Orders Placed This Encounter   Procedures    Follow-Up with Cardiology HARRY    Leadless EKG Monitor 8 to 14 Days    Echocardiogram Complete           CURRENT MEDICATIONS:  Current Outpatient Medications   Medication Sig Dispense Refill    lactulose (CHRONULAC) 10 GM/15ML solution Take 15 mLs (10 g) by mouth 2 times daily 946 mL 0    nitroGLYcerin (NITROSTAT) 0.4 MG sublingual tablet For chest pain place 1 tablet under the tongue every 5 minutes for 3 doses. If symptoms persist 5 minutes after 1st dose call 911. 15 tablet 0    rifaximin (XIFAXAN) 550 MG TABS tablet Take 1 tablet (550 mg) by mouth 2 times daily 60 tablet 0    sevelamer carbonate (RENVELA) 800 MG tablet Take 1 tablet (800 mg) by mouth 3 times daily (with meals) 90 tablet 0    apixaban ANTICOAGULANT (ELIQUIS) 5 MG tablet Take 1 tablet (5 mg) by mouth 2 times daily 60 tablet 1    hydrOXYzine (ATARAX) 25 MG tablet Take 1 tablet (25 mg) by mouth 3 times daily as needed for itching 30 tablet 2    Lacosamide (VIMPAT) 100 MG TABS tablet Take 1 tablet (100 mg) by  mouth every 12 hours 60 tablet 1    levETIRAcetam (KEPPRA) 1000 MG tablet Take 1 tablet (1,000 mg) by mouth every 24 hours 30 tablet 1    melatonin 3 MG tablet Take 1 tablet (3 mg) by mouth At Bedtime 30 tablet 1    midodrine (PROAMATINE) 10 MG tablet Take 1 tablet (10 mg) by mouth 3 times daily (with meals) 90 tablet 1    multivitamin RENAL (TRIPHROCAPS) 1 capsule capsule Take 1 capsule by mouth daily 30 capsule 1    pantoprazole (PROTONIX) 40 MG EC tablet Take 1 tablet (40 mg) by mouth every morning (before breakfast) 30 tablet 1    ramelteon (ROZEREM) 8 MG tablet Take 1 tablet (8 mg) by mouth every evening 30 tablet 1    rOPINIRole (REQUIP) 0.5 MG tablet Take 1 tablet (0.5 mg) by mouth 2 times daily 60 tablet 2    tacrolimus (GENERIC EQUIVALENT) 1 MG capsule Take 1 capsule (1 mg) by mouth every 12 hours 60 capsule 1    traZODone (DESYREL) 50 MG tablet Take 25 mg by mouth At Bedtime           ALLERGIES:  No Known Allergies    PAST MEDICAL HISTORY:    Past Medical History:   Diagnosis Date    Acquired immunocompromised state (H) 11/19/2022    Ascites     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    Cirrhosis of liver with ascites (H) 2/11/2016    H/O alcohol abuse     HTN (hypertension)     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    NAFLD (nonalcoholic fatty liver disease) 2/11/2016    CHRISTIAN on CPAP     Parainfluenza type 1 infection 11/19/2022    SBP (spontaneous bacterial peritonitis) (H)     [facility]    Sepsis due to Streptococcus pneumoniae with acute hypoxic respiratory failure (H) 11/19/2022    Tubular adenoma 10/2019    Large cecal adenoma- due for surveillance colonoscopy in 3 years (10/2022)       PAST SURGICAL HISTORY:    Past Surgical History:   Procedure Laterality Date    APPENDECTOMY      BENCH LIVER N/A 9/20/2016    Procedure: BENCH LIVER;  Surgeon: Enoc Crews MD;  Location: [facility] OR    BRONCHOSCOPY FLEXIBLE AND RIGID N/A 12/20/2022    Procedure: BRONCHOSCOPY;  Surgeon: Alena Valenzuela,  MD;  Location:  GI    COLONOSCOPY  13    repeat in 2018    COLONOSCOPY N/A 10/4/2019    Procedure: COLONOSCOPY, WITH POLYPECTOMY AND BIOPSY;  Surgeon: Go Chong MD;  [facility] OR    CREATE FISTULA ARTERIOVENOUS UPPER EXTREMITY Left 3/21/2023    Procedure: LEFT UPPER EXTREMITY ARTERIOVENOUS FISTULA CREATION. LIGATION OF COMPETING VASCULAR BRANCHES- LEFT;  Surgeon: Deejay Mcneil MD;  Location: [facility] OR    HERNIA REPAIR      IR CHEST TUBE PLACEMENT NON-TUNNELED RIGHT  2022    IR CVC TUNNEL PLACEMENT > 5 YRS OF AGE  2022    IR GASTROSTOMY TUBE CHANGE  2023    IR GASTROSTOMY TUBE PERCUTANEOUS PLCMNT  2022    IR PARACENTESIS  2022    IR PARACENTESIS  2022    IR THORACENTESIS  2022    IR THORACENTESIS  2020    IR THORACENTESIS  8/10/2020    IR TRANSCATHETER BIOPSY  2022    TRACHEOSTOMY N/A 2022    Procedure: TRACHEOSTOMY;  Surgeon: Nilesh Jackson MD;  Location:  OR    TRANSPLANT LIVER RECIPIENT  DONOR N/A 2016    Procedure: TRANSPLANT LIVER RECIPIENT  DONOR;  Surgeon: Enoc Crews MD;  Location: [facility] OR       FAMILY HISTORY:    Family History   Problem Relation Age of Onset    Coronary Artery Disease No family hx of     Cardiomyopathy No family hx of        SOCIAL HISTORY:    Social History     Socioeconomic History    Marital status:      Spouse name: None    Number of children: None    Years of education: None    Highest education level: None   Tobacco Use    Smoking status: Never    Smokeless tobacco: Never   Vaping Use    Vaping status: Never Used   Substance and Sexual Activity    Alcohol use: Not Currently     Alcohol/week: 0.0 standard drinks of alcohol    Drug use: No

## 2023-05-04 NOTE — PATIENT INSTRUCTIONS
In 6 months, 14-day heart monitor, heart ultrasound or echocardiogram and follow-up with HARRY.    There are many questions, please send my office a [employer] message.

## 2023-05-09 ENCOUNTER — TELEPHONE (OUTPATIENT)
Dept: FAMILY MEDICINE | Facility: CLINIC | Age: 58
End: 2023-05-09
Payer: COMMERCIAL

## 2023-05-09 ENCOUNTER — MEDICAL CORRESPONDENCE (OUTPATIENT)
Dept: HEALTH INFORMATION MANAGEMENT | Facility: CLINIC | Age: 58
End: 2023-05-09

## 2023-05-09 ENCOUNTER — TELEPHONE (OUTPATIENT)
Dept: PHARMACY | Facility: CLINIC | Age: 58
End: 2023-05-09
Payer: COMMERCIAL

## 2023-05-09 NOTE — TELEPHONE ENCOUNTER
Received message from Gunjan Chicas, PharmD at [facility].  Patient's spouse called asking how medications need to be given in relationship to dialysis.      Per review of [employer] - no medications currently listed on his chart in Epic need to be adjusted for dialysis.  There are suggestions to consider a supplemental dose at 50% for lacosamide after dialysis and a 250-500mg supplemental dose for levetiracetam after dialysis.  However, per review of patient's chart, he's been receiving dialysis for a few months now so assuming he's stable, supplemental doses may not be necessary.    Communicated this with Gunjan, she will update patient/pt's spouse.  Patient was also recently referred to MT, encouraged scheduling visit.    Jenise Hooker, PharmD, [facility]  Medication Therapy Management Provider  Pager: [phone]

## 2023-05-09 NOTE — TELEPHONE ENCOUNTER
Forms/Letter Request    Type of form/letter: [employer] Eligibility Application    Have you been seen for this request: Yes     Do we have the form/letter: Yes:     Who is the form from? Patient    Where did/will the form come from? Patient or family brought in       When is form/letter needed by: ASAP    How would you like the form/letter returned:  @     Patient Notified form requests are processed in 3-5 business days:Yes    Could we send this information to you in [facility] or would you prefer to receive a phone call?:   Patient would prefer a phone call   Okay to leave a detailed message?: Yes at Cell number on file:    Telephone Information:   Mobile [phone]

## 2023-05-10 ENCOUNTER — VIRTUAL VISIT (OUTPATIENT)
Dept: FAMILY MEDICINE | Facility: CLINIC | Age: 58
End: 2023-05-10
Payer: COMMERCIAL

## 2023-05-10 DIAGNOSIS — F51.01 PRIMARY INSOMNIA: ICD-10-CM

## 2023-05-10 DIAGNOSIS — R18.8 CIRRHOSIS OF LIVER WITH ASCITES, UNSPECIFIED HEPATIC CIRRHOSIS TYPE (H): Primary | ICD-10-CM

## 2023-05-10 DIAGNOSIS — R53.81 PHYSICAL DECONDITIONING: ICD-10-CM

## 2023-05-10 DIAGNOSIS — G72.81 CRITICAL ILLNESS MYOPATHY: ICD-10-CM

## 2023-05-10 DIAGNOSIS — I95.3 HEMODIALYSIS-ASSOCIATED HYPOTENSION: ICD-10-CM

## 2023-05-10 DIAGNOSIS — Z99.2 ESRD (END STAGE RENAL DISEASE) ON DIALYSIS (H): ICD-10-CM

## 2023-05-10 DIAGNOSIS — K74.60 CIRRHOSIS OF LIVER WITH ASCITES, UNSPECIFIED HEPATIC CIRRHOSIS TYPE (H): Primary | ICD-10-CM

## 2023-05-10 DIAGNOSIS — K21.00 GASTROESOPHAGEAL REFLUX DISEASE WITH ESOPHAGITIS, UNSPECIFIED WHETHER HEMORRHAGE: ICD-10-CM

## 2023-05-10 DIAGNOSIS — N18.6 ESRD (END STAGE RENAL DISEASE) ON DIALYSIS (H): ICD-10-CM

## 2023-05-10 DIAGNOSIS — G25.81 RESTLESS LEG SYNDROME: ICD-10-CM

## 2023-05-10 PROCEDURE — 99443 PR PHYSICIAN TELEPHONE EVALUATION 21-30 MIN: CPT | Mod: 95 | Performed by: PHYSICIAN ASSISTANT

## 2023-05-10 RX ORDER — MIDODRINE HYDROCHLORIDE 10 MG/1
10 TABLET ORAL
Qty: 90 TABLET | Refills: 1 | Status: SHIPPED | OUTPATIENT
Start: 2023-05-10 | End: 2023-05-22

## 2023-05-10 RX ORDER — TRAZODONE HYDROCHLORIDE 50 MG/1
25 TABLET, FILM COATED ORAL AT BEDTIME
COMMUNITY
End: 2023-06-20

## 2023-05-10 RX ORDER — ROPINIROLE 0.5 MG/1
0.5 TABLET, FILM COATED ORAL 2 TIMES DAILY
Qty: 60 TABLET | Refills: 2 | Status: SHIPPED | OUTPATIENT
Start: 2023-05-10 | End: 2023-08-04

## 2023-05-10 RX ORDER — OMEPRAZOLE 40 MG/1
40 CAPSULE, DELAYED RELEASE ORAL DAILY
COMMUNITY
End: 2023-05-30

## 2023-05-10 NOTE — TELEPHONE ENCOUNTER
Tried calling to schedule a telephone visit to fill out form.  Unable to LVM,,mailbox is full.  Form is at Alyssa's desk

## 2023-05-10 NOTE — PROGRESS NOTES
Blanco is a 57 year old who is being evaluated via a billable telephone visit.      What phone number would you like to be contacted at? [phone]  How would you like to obtain your AVS? Vikki    Distant Location (provider location):  On-site    Assessment & Plan     Cirrhosis of liver with ascites, unspecified hepatic cirrhosis type (H)  Continue close follow-up with his liver team.  Upcoming appointment hopefully with MTM to help further with some of his medications.  - Ammonia    Critical illness myopathy  Physical deconditioning  I believe he would benefit greatly from a outpatient physical therapy.  Order signed.  - Physical Therapy Referral    ESRD (end stage renal disease) on dialysis (H)  Looking into changing dialysis sites.  We will keep me posted.    Gastroesophageal reflux disease with esophagitis, unspecified whether hemorrhage  Reasonable to switch from Protonix to omeprazole 40 mg daily.  May switch back if breakthrough symptoms.    Hemodialysis-associated hypotension  Refill midodrine.  - midodrine (PROAMATINE) 10 MG tablet  Dispense: 90 tablet; Refill: 1    Restless leg syndrome  Increase Requip 2.5 twice daily daily.  Sent new prescription.  - rOPINIRole (REQUIP) 0.5 MG tablet  Dispense: 60 tablet; Refill: 2    Primary insomnia  Stop Ramelteon.  Start at home trazodone.  Currently has 50 mg tablet.  Recommended 25 mg nightly.      30 minutes spent by me on the date of the encounter doing chart review, review of test results, interpretation of tests, patient visit and documentation        The likelihood of other entities in the differential is insufficient to justify any further testing for them at this time. This was explained to the patient. The patient was advised that persistent or worsening symptoms would require further evaluation. Patient advised to call the office and if unable to reach to go to the emergency room if they develop any new or worsening symptoms. Expressed understanding and  agreement with above stated plan.     WALDO Ronquillo [facility] [place] is a 57 year old, presenting for the following health issues:  Form Request (Patient having a telephone appointment for [employer] forms.)    Here today for medication follow-up as well as for discussion in regards to [employer] home forms.    -Started occupational therapy and speech therapy at home.  Needs outpatient referral for physical therapy given he is not homebound.    -Taking Protonix 40 mg for history of GERD with esophagitis.  Has a number of omeprazole at home, patient's family questions whether he could transition to this.    -Ongoing issues with restless leg syndrome.  Made worse following prolonged periods of sitting during dialysis.  Currently taking Requip 0.5 mg at nighttime.    -Needs refill on midodrine which he takes daily for hypotension.    -Working closely with his liver transplant team.  Now off rifaximin.  Continuing lactulose.    -Notices that Ramelteon is no longer helpful for sleep.  Previously was on trazodone 50 mg which was too large of a dose.      Review of Systems   Constitutional, HEENT, cardiovascular, pulmonary, GI, , musculoskeletal, neuro, skin, endocrine and psych systems are negative, except as otherwise noted.      Objective    Vitals - Patient Reported  Systolic (Patient Reported): 102  Diastolic (Patient Reported): 78  Pain Score: Severe Pain (7)      Vitals:  No vitals were obtained today due to virtual visit.    Physical Exam   healthy, alert and no distress  PSYCH: Alert and oriented times 3; coherent speech, normal   rate and volume, able to articulate logical thoughts, able   to abstract reason, no tangential thoughts, no hallucinations   or delusions  His affect is normal  RESP: No cough, no audible wheezing, able to talk in full sentences  Remainder of exam unable to be completed due to telephone visits    Phone call duration: 22 minutes

## 2023-05-11 ENCOUNTER — MEDICAL CORRESPONDENCE (OUTPATIENT)
Dept: HEALTH INFORMATION MANAGEMENT | Facility: CLINIC | Age: 58
End: 2023-05-11
Payer: COMMERCIAL

## 2023-05-16 ENCOUNTER — MEDICAL CORRESPONDENCE (OUTPATIENT)
Dept: HEALTH INFORMATION MANAGEMENT | Facility: CLINIC | Age: 58
End: 2023-05-16

## 2023-05-20 ENCOUNTER — NURSE TRIAGE (OUTPATIENT)
Dept: NURSING | Facility: CLINIC | Age: 58
End: 2023-05-20
Payer: COMMERCIAL

## 2023-05-20 NOTE — TELEPHONE ENCOUNTER
Pt gave verbal permission to speak with wife about his medical care     Pt is wanting medications moved to a different pharmacy     Pt will be calling his provider on Monday     No triage     Jacklyn Duron RN  [facility] Nurse Advisor  10:32 AM 5/20/2023      Reason for Disposition    General information question, no triage required and triager able to answer question    Additional Information    Negative: [1] Caller is not with the adult (patient) AND [2] reporting urgent symptoms    Negative: Lab result questions    Negative: Medication questions    Negative: Caller can't be reached by phone    Negative: Caller has already spoken to PCP or another triager    Negative: RN needs further essential information from caller in order to complete triage    Negative: Requesting regular office appointment    Negative: [1] Caller requesting NON-URGENT health information AND [2] PCP's office is the best resource    Negative: Health Information question, no triage required and triager able to answer question    Protocols used: INFORMATION ONLY CALL - NO TRIAGE-A-

## 2023-05-22 DIAGNOSIS — I48.0 PAROXYSMAL ATRIAL FIBRILLATION (H): ICD-10-CM

## 2023-05-22 DIAGNOSIS — N18.6 ESRD (END STAGE RENAL DISEASE) ON DIALYSIS (H): ICD-10-CM

## 2023-05-22 DIAGNOSIS — R12 HEARTBURN: ICD-10-CM

## 2023-05-22 DIAGNOSIS — Z94.4 LIVER TRANSPLANTED (H): ICD-10-CM

## 2023-05-22 DIAGNOSIS — Z99.2 ESRD (END STAGE RENAL DISEASE) ON DIALYSIS (H): ICD-10-CM

## 2023-05-22 DIAGNOSIS — G47.00 INSOMNIA, UNSPECIFIED TYPE: ICD-10-CM

## 2023-05-22 DIAGNOSIS — I95.3 HEMODIALYSIS-ASSOCIATED HYPOTENSION: ICD-10-CM

## 2023-05-22 DIAGNOSIS — G25.81 RESTLESS LEG SYNDROME: ICD-10-CM

## 2023-05-22 DIAGNOSIS — R56.9 SEIZURES (H): ICD-10-CM

## 2023-05-22 RX ORDER — RAMELTEON 8 MG/1
8 TABLET ORAL EVERY EVENING
Qty: 30 TABLET | Refills: 1 | Status: ON HOLD | OUTPATIENT
Start: 2023-05-22 | End: 2023-07-13

## 2023-05-22 RX ORDER — LEVETIRACETAM 1000 MG/1
1000 TABLET ORAL EVERY 24 HOURS
Qty: 30 TABLET | Refills: 1 | Status: SHIPPED | OUTPATIENT
Start: 2023-05-22 | End: 2023-06-20

## 2023-05-22 RX ORDER — LACOSAMIDE 100 MG/1
100 TABLET ORAL EVERY 12 HOURS
Qty: 60 TABLET | Refills: 1 | Status: SHIPPED | OUTPATIENT
Start: 2023-05-22 | End: 2023-08-04

## 2023-05-22 RX ORDER — MIDODRINE HYDROCHLORIDE 10 MG/1
10 TABLET ORAL
Qty: 90 TABLET | Refills: 1 | Status: SHIPPED | OUTPATIENT
Start: 2023-05-22 | End: 2023-05-25

## 2023-05-22 RX ORDER — FOLIC ACID 1 MG/1
1 TABLET ORAL DAILY
Qty: 30 TABLET | Refills: 1 | Status: SHIPPED | OUTPATIENT
Start: 2023-05-22 | End: 2023-05-31 | Stop reason: ALTCHOICE

## 2023-05-22 RX ORDER — PANTOPRAZOLE SODIUM 40 MG/1
40 TABLET, DELAYED RELEASE ORAL
Qty: 30 TABLET | Refills: 1 | Status: ON HOLD | OUTPATIENT
Start: 2023-05-22 | End: 2023-09-27

## 2023-05-22 RX ORDER — B COMPLEX, C NO.20/FOLIC ACID 1 MG
1 CAPSULE ORAL DAILY
Qty: 30 CAPSULE | Refills: 1 | Status: SHIPPED | OUTPATIENT
Start: 2023-05-22 | End: 2023-05-25

## 2023-05-22 RX ORDER — LANOLIN ALCOHOL/MO/W.PET/CERES
3 CREAM (GRAM) TOPICAL AT BEDTIME
Qty: 30 TABLET | Refills: 1 | Status: SHIPPED | OUTPATIENT
Start: 2023-05-22 | End: 2023-08-04

## 2023-05-22 RX ORDER — TACROLIMUS 1 MG/1
1 CAPSULE ORAL EVERY 12 HOURS
Qty: 60 CAPSULE | Refills: 1 | Status: SHIPPED | OUTPATIENT
Start: 2023-05-22 | End: 2023-08-04

## 2023-05-22 NOTE — TELEPHONE ENCOUNTER
Signed. He has follow-up scheduled in July. Please also encourage them to schedule with MTM per Denys's recommendations: [phone]  Thanks  STEFFANY Fajardo CNP

## 2023-05-22 NOTE — TELEPHONE ENCOUNTER
Patient's wife calling to request refills. No consent to communicate on file. Requested refills and pharmacy pended. No health information released. Wife states that the patient will stop by clinic today after dialysis to sign SONI.    Patient was hospitalized for 5 months and has been home for 3 weeks. Wife states that he only has 2 days left of medications. Patient did have hospital follow up and a virtual visit since his discharge from the hospital.    Patient reports 2 medication changes that were made by dialysis, Dr. Zenon Bradshaw.         Patient needs refill:  midrodrine 10 mg tablet is still 10 mg TID except on dialysis day patient to take 20 mg before dialysis then resume 10 mg dose at noon and dinner.    Other medication that was changed, but the patient does not need a refill:       sevelamer carbonate 800 mg tablet, take 2 tablets 3 times daily with meals.     Please refill ASAP.  Call back wife when done. Patient to give verbal consent to communicate at that time.     Aracely Licea RN

## 2023-05-23 DIAGNOSIS — N18.6 ESRD (END STAGE RENAL DISEASE) ON DIALYSIS (H): ICD-10-CM

## 2023-05-23 DIAGNOSIS — G25.81 RESTLESS LEG SYNDROME: ICD-10-CM

## 2023-05-23 DIAGNOSIS — Z99.2 ESRD (END STAGE RENAL DISEASE) ON DIALYSIS (H): ICD-10-CM

## 2023-05-23 DIAGNOSIS — I95.3 HEMODIALYSIS-ASSOCIATED HYPOTENSION: ICD-10-CM

## 2023-05-23 RX ORDER — MIDODRINE HYDROCHLORIDE 10 MG/1
10 TABLET ORAL
Qty: 90 TABLET | Refills: 1 | Status: CANCELLED | OUTPATIENT
Start: 2023-05-23

## 2023-05-23 NOTE — TELEPHONE ENCOUNTER
Patient's wife called (C2C on file) and stated that the order for Midodrine is incorrect. Patient takes it one tablet three times a day and 2 tablets before dialysis and 1 tablet during dialysis. Pharmacy is pended.     Carolina FUENTES RN  [facility] Triage Team

## 2023-05-25 ENCOUNTER — TELEPHONE (OUTPATIENT)
Dept: FAMILY MEDICINE | Facility: CLINIC | Age: 58
End: 2023-05-25
Payer: COMMERCIAL

## 2023-05-25 ENCOUNTER — MEDICAL CORRESPONDENCE (OUTPATIENT)
Dept: HEALTH INFORMATION MANAGEMENT | Facility: CLINIC | Age: 58
End: 2023-05-25

## 2023-05-25 DIAGNOSIS — L29.9 ITCHING: Primary | ICD-10-CM

## 2023-05-25 DIAGNOSIS — I95.3 HEMODIALYSIS-ASSOCIATED HYPOTENSION: ICD-10-CM

## 2023-05-25 DIAGNOSIS — G25.81 RESTLESS LEG SYNDROME: ICD-10-CM

## 2023-05-25 DIAGNOSIS — Z99.2 ESRD (END STAGE RENAL DISEASE) ON DIALYSIS (H): ICD-10-CM

## 2023-05-25 DIAGNOSIS — N18.6 ESRD (END STAGE RENAL DISEASE) ON DIALYSIS (H): ICD-10-CM

## 2023-05-25 RX ORDER — B COMPLEX, C NO.20/FOLIC ACID 1 MG
1 CAPSULE ORAL DAILY
Qty: 30 CAPSULE | Refills: 1 | Status: SHIPPED | OUTPATIENT
Start: 2023-05-25 | End: 2023-05-25

## 2023-05-25 RX ORDER — HYDROXYZINE HYDROCHLORIDE 25 MG/1
25 TABLET, FILM COATED ORAL 3 TIMES DAILY PRN
Qty: 30 TABLET | Refills: 2 | Status: SHIPPED | OUTPATIENT
Start: 2023-05-25 | End: 2023-06-09

## 2023-05-25 RX ORDER — MIDODRINE HYDROCHLORIDE 10 MG/1
10 TABLET ORAL
Qty: 90 TABLET | Refills: 1 | Status: SHIPPED | OUTPATIENT
Start: 2023-05-25 | End: 2023-06-09

## 2023-05-25 RX ORDER — B COMPLEX, C NO.20/FOLIC ACID 1 MG
1 CAPSULE ORAL DAILY
Qty: 30 CAPSULE | Refills: 1 | Status: SHIPPED | OUTPATIENT
Start: 2023-05-25 | End: 2023-07-21

## 2023-05-25 NOTE — TELEPHONE ENCOUNTER
Called verbal order with PCP approval to the pharmacy. Not sure why the RX continues to fail in transmission.     Voice mail message left at pharmacy due to long wait times.       Felisha Turk RN  [facility]

## 2023-05-25 NOTE — TELEPHONE ENCOUNTER
RX transmission failed to pharmacy. Will re-order.     Felisha Turk RN  [facility]

## 2023-05-25 NOTE — TELEPHONE ENCOUNTER
Ki Carter PA-C  Cs Triage Im 3 hours ago (12:21 PM)     MG  Sent refill on Midodrine + sent hydroxyzine (Atarax) to be used 3 times daily as needed for itching.      Called and notified pt of Rxs approved    Laura ATKINS, Triage RN  [facility]

## 2023-05-25 NOTE — TELEPHONE ENCOUNTER
Prior Authorization Retail Medication Request    Medication/Dose: Triphrocaps capsules  ICD code (if different than what is on RX):  N18.6, Z99.2, G25.81  Previously Tried and Failed:  None  Rationale:  Patient stable on supplemental multivitamin     Insurance Name:  [employer]  Insurance ID:  [id number]      Pharmacy Information (if different than what is on RX)  Name:  [id number]  Phone:  [phone]

## 2023-05-25 NOTE — TELEPHONE ENCOUNTER
PCP, can you change the sig on RX for Midodrine ?        Patient is taking this medication one tablet with each meal and one before dialysis  one during dialysisand M,W,F.    He will need more medication.     The medication has help  stable ize his blood pressure.   His blood pressure was 98/48 this morning, when he takes the medication the B/P went  up to normal range so they can complete the dialysis.      What can the patient take for itching?   He is  requesting an oral medication.       Felisha Turk RN  [facility]

## 2023-05-30 ENCOUNTER — NURSE TRIAGE (OUTPATIENT)
Dept: FAMILY MEDICINE | Facility: CLINIC | Age: 58
End: 2023-05-30

## 2023-05-30 ENCOUNTER — OFFICE VISIT (OUTPATIENT)
Dept: FAMILY MEDICINE | Facility: CLINIC | Age: 58
End: 2023-05-30
Payer: COMMERCIAL

## 2023-05-30 ENCOUNTER — TELEPHONE (OUTPATIENT)
Dept: FAMILY MEDICINE | Facility: CLINIC | Age: 58
End: 2023-05-30

## 2023-05-30 ENCOUNTER — ANCILLARY PROCEDURE (OUTPATIENT)
Dept: GENERAL RADIOLOGY | Facility: CLINIC | Age: 58
End: 2023-05-30
Attending: PHYSICIAN ASSISTANT
Payer: COMMERCIAL

## 2023-05-30 VITALS
SYSTOLIC BLOOD PRESSURE: 112 MMHG | DIASTOLIC BLOOD PRESSURE: 71 MMHG | TEMPERATURE: 97.3 F | RESPIRATION RATE: 17 BRPM | HEART RATE: 61 BPM | WEIGHT: 186.3 LBS | HEIGHT: 72 IN | OXYGEN SATURATION: 99 % | BODY MASS INDEX: 25.23 KG/M2

## 2023-05-30 DIAGNOSIS — T14.8XXA HEMATOMA OF SKIN: ICD-10-CM

## 2023-05-30 DIAGNOSIS — S99.911A ANKLE INJURY, RIGHT, INITIAL ENCOUNTER: Primary | ICD-10-CM

## 2023-05-30 DIAGNOSIS — L98.9 SKIN LESION: ICD-10-CM

## 2023-05-30 PROCEDURE — 99213 OFFICE O/P EST LOW 20 MIN: CPT | Performed by: PHYSICIAN ASSISTANT

## 2023-05-30 PROCEDURE — 73610 X-RAY EXAM OF ANKLE: CPT | Mod: TC | Performed by: RADIOLOGY

## 2023-05-30 ASSESSMENT — PAIN SCALES - GENERAL: PAINLEVEL: SEVERE PAIN (6)

## 2023-05-30 NOTE — TELEPHONE ENCOUNTER
Called pharmacy  They closed early today - no tech or pharmacist on site currently   Reopens at 8am tomorrow     Brooklyn Guerrero RN  [facility]

## 2023-05-30 NOTE — RESULT ENCOUNTER NOTE
Patient does not check mychart, please call him to let him know no fracture on x-ray.    If his pain and swelling don't improve over the next 2-3 weeks please let me know.    Thanks.

## 2023-05-30 NOTE — PROGRESS NOTES
Assessment and Plan:     (S99.911A) Ankle injury, right, initial encounter  (primary encounter diagnosis)  Comment: turned right ankle one week ago, has noticed progressive swelling and now more pain, able to bear weight  Plan: XR Ankle Right G/E 3 Views  Elevate, walker or crutches as needed  Will notify of above results     (T14.8XXA) Hematoma of skin  Comment: anterior ankle due to eliquis and recent trauma with fall  Plan: warm compress to area 2-3 times per day, elevate    (L98.9) Skin lesion  Comment: scabbed right forearm lesion, present x months per patient   Plan: Adult Dermatology Referral      Paola Benjamin PA-C        Rolf Simeon is a 57 year old, presenting for the following health issues:  No chief complaint on file.         View : No data to display.              History of Present Illness       Reason for visit:  Ankle  Symptom onset:  1-3 days ago  Symptoms include:  Ankle  Symptom intensity:  Mild  Symptom progression:  Staying the same  Had these symptoms before:  Yes  Has tried/received treatment for these symptoms:  No  What makes it worse:  No  What makes it better:  Tylenol    He eats 4 or more servings of fruits and vegetables daily.He consumes 0 sweetened beverage(s) daily.He exercises with enough effort to increase his heart rate 20 to 29 minutes per day.  He exercises with enough effort to increase his heart rate 5 days per week.   He is taking medications regularly.     Mr. Osborne is here for right ankle swelling  He was recently discharged after a prolonged hospital stay and has been working with therapy on regaining strength and endurance since discharge in April  He was trying to do some exercises at home and decided to do stairs, he was going up stairs when his right ankle buckled one week ago  He feel forward and caught himself, he did not fall to the ground  He suspects he hit front of ankle on step  He initially had some right knee pain which has since  resolved  He has also had right ankle pain and swelling which has increased since the incident     He has had a wound on his right arm since he was in the hospital  He'd like to see dermatology     Review of Systems   See above      Objective    There were no vitals taken for this visit.  There is no height or weight on file to calculate BMI.     /71 (BP Location: Left arm, Patient Position: Sitting, Cuff Size: Adult Large)   Pulse 61   Temp 97.3  F (36.3  C) (Temporal)   Resp 17   Ht 1.829 m (6')   Wt 84.5 kg (186 lb 4.8 oz)   SpO2 99%   BMI 25.27 kg/m      Physical Exam     GENERAL:in NAD  RESP: lungs clear to auscultation - no rales, no rhonchi, no wheezes  CV: regular rates and rhythm, normal S1 S2, no S3 or S4 and no murmur, no click or rub   MS: extremities- no gross deformities noted, diffuse swelling of right ankle with anterior bruising, likely 2/2 hematoma  Tender over the lateral malleolus   Mild pain with ROM  DP and PT palpable  Right knee w/mild diffuse swelling, non-tender, stable w/varus and valgus stress, no pain with ROM   Skin: scabbed over lesion right forearm, non-tender, no erythema or surrounding induration

## 2023-05-30 NOTE — TELEPHONE ENCOUNTER
Pt's spouse called     Twisted right ankle - 1 week ago Tuesday   Knee is a little bruise, popping sound.   On Eliquis BID  Walking on ankle is painful   [place] PT [place], hung on to railing with left arm. Collapsed and got help up     PT is at his home, took a photo of the ankle, advised he needs to have it examined     APPEARANCE: swollen 2x as large as left side. Entire ankle is purple. Sometimes loud crack with walking  Initially was very swollen, tried to ice initially. Too painful then applied heat   PAIN: 6/10 weight bearing 1/10 no weight bearing   No breaks in skin     Discussed options of OV here vs walk in [facility] clinic. Prefers to come in and have x-rayed and examined and if needed to see ortho after that.     Appointments in Next Year    May 30, 2023 11:30 AM  (Arrive by 11:10 AM)  Provider Visit with Paola Benjamin PA-C  [facility] ([facility] ) [phone]   Jun 05, 2023 11:00 AM  (Arrive by 10:45 AM)  New Seizure with Reba Laughlin MD  [facility] Epile[facility] ([facility]) [phone]   Jun 20, 2023  9:00 AM  MTM New with Jenise Hooker PharmD  [facility] ([facility] ) [phone]   Jul 11, 2023 10:30 AM  (Arrive by 10:10 AM)  Provider Visit with Ki Carter PA-C  [facility] ([facility] ) [phone]   Aug 25, 2023  8:45 AM  (Arrive by 8:30 AM)  Return Liver Transplant with Juan Simms MD  [facility] [place] ([facility] ) [phone]   Nov 06, 2023 12:45 PM  ECHO COMPLETE with [id number]  [facility] ([facility] ) [phone]   Nov 06, 2023  2:30 PM  ZIOPATCH MONITOR with JEROMY  [facility]  [facility] ([facility] ) [phone]   Nov 29, 2023  1:10 PM  (Arrive by 1:00 PM)  Return Cardiology with STEFFANY Gonzales CNP  [facility] [place] ([facility] ) [phone]          Laura ATKINS Triage RN  [facility]     Reason for Disposition    Large swelling or bruise and size > palm of person's hand    Additional Information    Negative: Major bleeding (actively dripping or spurting) that can't be stopped    Negative: Amputation or bone sticking through the skin    Negative: Looks like a dislocated joint (crooked or deformed)    Negative: Serious injury with multiple fractures (broken bones)    Negative: Sounds like a life-threatening emergency to the triager    Negative: Wound looks infected    Negative: Caused by an animal bite    Negative: Puncture wound of foot    Negative: Toe injury is main symptom    Negative: Cast problems or questions    Negative: Bullet, stabbed by knife or other serious penetrating wound    Negative: Can't stand (bear weight) or walk (e.g., 4 steps)    Negative: Skin is split open or gaping (length > 1/2 inch or 12 mm)    Negative: Bleeding won't stop after 10 minutes of direct pressure (using correct technique)    Negative: Dirt in the wound and not removed after 15 minutes of scrubbing    Negative: Numbness (new loss of sensation) of toe(s)    Negative: Looks infected (e.g., spreading redness, red streak, pus)    Negative: Sounds like a serious injury to the triager    Negative: A 'snap' or 'pop' was heard at the time of injury    Negative: SEVERE pain (e.g., excruciating)    Protocols used: ANKLE AND FOOT INJURY-A-OH

## 2023-05-30 NOTE — TELEPHONE ENCOUNTER
Received a call from [facility] stating thy received the script for the Triphrocaps but they also have a script for Folic Acid. Pharmacy is wondering if the patient should be taking both medications since the Triphrocaps have 1000 mg of Folic Acid.     Will route to PCP for review.    Pharmacy requests a call back and to leave message as they are super busy after the holiday.   Can we leave a detailed message on this number? YES  Phone number patient can be reached at: Other phone number: [phone]    Melissa Arevalo RN  [facility] Triage

## 2023-05-31 ENCOUNTER — MEDICAL CORRESPONDENCE (OUTPATIENT)
Dept: HEALTH INFORMATION MANAGEMENT | Facility: CLINIC | Age: 58
End: 2023-05-31

## 2023-05-31 NOTE — TELEPHONE ENCOUNTER
Wife returned call, and questioned stopping folic acid, as the Triphorocaps have other vitamins.  Cross checked with Denys Carter, who agreed that stopping folic acid is better option.  Left message for pharmacy of update to discontinue folic acid, but to keep taking triphrocaps.  Kacey Lange, RN  [facility] RN Triage Team

## 2023-05-31 NOTE — TELEPHONE ENCOUNTER
Called pharmacy   Long hold/wait times   Unable to reach representative   Will rachel recall    Laura ATKINS, Triage RN  [facility]

## 2023-05-31 NOTE — TELEPHONE ENCOUNTER
"Tried recalling pharmacy   \"there are more than 3 callers ahead of you\"     Called patient also to let him know - LVM for pt to call back or review message on chart     Sent pt a YOOSE message with provider response     Laura ATKINS, Triage RN  [facility]     "

## 2023-06-02 NOTE — TELEPHONE ENCOUNTER
PA Initiation    Medication: TRIPHROCAPS 1 MG PO CAPS  Insurance Company: [employer] - Phone [phone] Fax [phone]  Pharmacy Filling the Rx: [id number] - [place], [place] - [address] AT [facility] [address]  Filling Pharmacy Phone: [phone]  Filling Pharmacy Fax:    Start Date: 6/2/2023    Central Prior Authorization Team   Phone: [phone]

## 2023-06-05 ENCOUNTER — TELEPHONE (OUTPATIENT)
Dept: PSYCHIATRY | Facility: CLINIC | Age: 58
End: 2023-06-05

## 2023-06-05 ENCOUNTER — OFFICE VISIT (OUTPATIENT)
Dept: NEUROLOGY | Facility: CLINIC | Age: 58
End: 2023-06-05
Attending: PHYSICIAN ASSISTANT
Payer: COMMERCIAL

## 2023-06-05 VITALS
DIASTOLIC BLOOD PRESSURE: 76 MMHG | TEMPERATURE: 96.8 F | WEIGHT: 181 LBS | HEART RATE: 54 BPM | BODY MASS INDEX: 24.52 KG/M2 | HEIGHT: 72 IN | SYSTOLIC BLOOD PRESSURE: 120 MMHG

## 2023-06-05 DIAGNOSIS — Z87.898 HISTORY OF SEIZURE: ICD-10-CM

## 2023-06-05 DIAGNOSIS — G40.109 FOCAL EPILEPSY (H): Primary | ICD-10-CM

## 2023-06-05 PROCEDURE — 80235 DRUG ASSAY LACOSAMIDE: CPT | Mod: ORL | Performed by: PSYCHIATRY & NEUROLOGY

## 2023-06-05 NOTE — TELEPHONE ENCOUNTER
Patient arrived to his appointment with Dr. Laughlin bleeding from his fistula. Patient stated they had just left dialysis. Patient stated they started bleeding from the fistula when they were in the parking lot of the [facility] building. Writer and one other nurse assessed patient and applied pressure to the area. After about 30 seconds patient stopped bleeding. After bleeding stopped patient had a blood pressure of 117/79. Patient was advised to follow-up with dialysis center and explain what happened. Patient appeared weak and slightly unsteady. Patient reports this as normal after dialysis. Patient was seen returning to waiting room for appointment.

## 2023-06-05 NOTE — PROGRESS NOTES
"[facility] Epilepsy Care History and Physical       Patient:  Blanco Osborne  :  1965   Age:  57 year old   Today's Office Visit:  2023    Referring Provider:    Ki Carter PA-C  [address] [facility] [address]  [place] [address]    History of Present Illness:   Mr. Blanco Osborne is a pleasant 57-year-old right-handed male with history of NAFLD, S/p Liver Transplant 2016 who presents to this clinic for evaluation and management of his seizures.  He is accompoanied by his brother, Dylan who contributes to the history.  The patient was admitted to the hospital on 2022 with acute respiratory failure due to strep pneumonia and parainfluenza in the setting of being immunosuppressed host given liver transplant and immunosuppresants.  He had septic shock.    Blanco does not recall much of the day of admission.  According to his brother, Dylan, he was not feeling well prior to admission to the hospital, with cold symptoms.  He was confused.  They called 911.  He collapsed when he wanted to walk to the ambualnce.  He had respiratory arrest.  He had 20 minutes CPR and shock and ROSC achieved.     Hospital notes: \"He had acute respiratory failure which slowly improved over time. Complicating factor is that he failed extubation twice. He had a right sided chest tube which was removed but then replaced when pneumothorax recurred.\"    \"He developed acute renal failure due to this illness and is dialysis dependent as of this note.\" He is currently on dialysis M, W, F.   He was transferred to [facility] on trach and PEG and 30% oxygen.    Prolonged video EEG was performed - at [facility].  The patient had 2 seizures during this monitoring.  First seizure occurred on .  He had gaze deviation to the right followed by head turn to the right followed by facial twitching that progressed to shoulder twitching on the right and then whole body low amplitude twitching.  Ictal EEG onset was " "unclear since it happened during impedance checks.  The patient had another seizure on 12/21.  It started with gaze deviation to the right and head turn to the right followed by grimacing, stiffening and head jerks and right shoulder delivered followed by low amplitude body jerk and whole body twitching.  The seizure onset was left occipital region.    He was started on lacosamide and levetiracetam.  He is currently taking lacosamide 100 mg twice daily and levetiracetam 1000 mg daily. Medication side effects: Feeling tired and itchy.  Neither patient or his family noted any seizures since discharge from the hospital.  He never had any seizures in the past that they are aware of.    Epilepsy Risk Factors: Patient denies history of febrile seizures, meningitis, encephalitis, significant head injury or family history of epilepsy.  His seizure occurred in the setting of acute respiratory failure, septic shock and renal failure.  His MRI 12/16/2022 showed chronic right parietal encephalomalacia and multiple lacunar strokes in the left frontal, left temporal and posterior right frontal lobe, consistent with embolic strokes.  Past medical history:     Cirrhosis status post liver transplant 2016    End stage kidney disease on hemodialysis     Seizure disorder     Acute respiratory failure requiring tracheostomy     Pneumonia     ARDS     Right pneumothorax     Possible splenic infract     Chronic immunosuppression    Subacute bacterial peritonitis (SBP)    Paroxysmal atrial fibrillation    On chronic anticoagulation    Embolic strokes    PEA arrest     Anxiety    Restless legs syndrome     Brain MRI 12/16/2022 showed \"tiny recent ischemic infarcts in the left frontal lobe, left temporal lobe and posterior right frontal lobe consistent with embolic infarcts from a central embolic source. A small area of chronic encephalomalacia at the high posterior right parietal lobe possibly due to old traumatic or ischemic injury. 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     NAFLD (nonalcoholic fatty liver disease) 2/11/2016     CHRISTIAN on CPAP      Parainfluenza type 1 infection 11/19/2022     SBP (spontaneous bacterial peritonitis) (H)     [facility]     Sepsis due to Streptococcus pneumoniae with acute hypoxic respiratory failure (H) 11/19/2022     Tubular adenoma 10/2019    Large cecal adenoma- due for surveillance colonoscopy in 3 years (10/2022)      Past Surgical History:   Procedure Laterality Date     APPENDECTOMY       BENCH LIVER N/A 9/20/2016    Procedure: BENCH LIVER;  Surgeon: Enoc Crews MD;  Location: [facility] OR     BRONCHOSCOPY FLEXIBLE AND RIGID N/A 12/20/2022    Procedure: BRONCHOSCOPY;  Surgeon: Alena Valenzuela MD;  Location:  GI     COLONOSCOPY  8/6/13    repeat in 2018     COLONOSCOPY N/A 10/4/2019    Procedure: COLONOSCOPY, WITH POLYPECTOMY AND BIOPSY;  Surgeon: Go Chong MD;  Location:  OR     CREATE FISTULA ARTERIOVENOUS UPPER EXTREMITY Left 3/21/2023    Procedure: LEFT UPPER EXTREMITY ARTERIOVENOUS FISTULA CREATION. LIGATION OF COMPETING VASCULAR BRANCHES- LEFT;  Surgeon: Deejay Mcneil MD;  Location:  OR     HERNIA REPAIR       IR CHEST TUBE PLACEMENT NON-TUNNELED RIGHT  12/12/2022     IR CVC TUNNEL PLACEMENT > 5 YRS OF AGE  12/6/2022     IR GASTROSTOMY TUBE CHANGE  2/8/2023     IR GASTROSTOMY TUBE PERCUTANEOUS PLCMNT  11/29/2022     IR PARACENTESIS  11/29/2022     IR PARACENTESIS  12/1/2022     IR THORACENTESIS  12/6/2022     IR THORACENTESIS  " "2020     IR THORACENTESIS  8/10/2020     IR TRANSCATHETER BIOPSY  2022     TRACHEOSTOMY N/A 2022    Procedure: TRACHEOSTOMY;  Surgeon: Nilesh Jackson MD;  Location: [facility] OR     TRANSPLANT LIVER RECIPIENT  DONOR N/A 2016    Procedure: TRANSPLANT LIVER RECIPIENT  DONOR;  Surgeon: Enoc Crews MD;  Location: [facility] OR     No family history on file.   Social History     Socioeconomic History     Marital status:      Spouse name: None     Number of children: None     Years of education: None     Highest education level: None   Tobacco Use     Smoking status: Never     Smokeless tobacco: Never   Vaping Use     Vaping status: Never Used   Substance and Sexual Activity     Alcohol use: Not Currently     Alcohol/week: 0.0 standard drinks of alcohol     Drug use: No      Employment/School: He worked as .   Previous Evaluations for Epilepsy:   EEG: SUMMARY OF FOUR DAYS OF VIDEO EEG -: \"Four day of Video EEG is abnormal due to presence of generalized delta theta slowing consistent with a moderate to severe encephalopathy. On Video EEG day 3 and 4 he had posterior dominant rhythm 6-7 hz. Patient had 2 electroclinical seizure (day 1 and 2) .  Clinically he had gaze deviation to the right, head turn to the right, facial twitching, that progresses to shoulder twitching on the right side, head jerks, then whole body low amplitude twitching.  Electrographically at onset of seizure on Video EEG day 1 there was an impedance check and we are not able to localize seizure onset. Day 2 seizure had left occipital focal onset.\"      Current Outpatient Medications   Medication Sig Dispense Refill     apixaban ANTICOAGULANT (ELIQUIS) 5 MG tablet Take 1 tablet (5 mg) by mouth 2 times daily 60 tablet 1     hydrOXYzine (ATARAX) 25 MG tablet Take 1 tablet (25 mg) by mouth 3 times daily as needed for itching 30 tablet 2     Lacosamide (VIMPAT) 100 MG TABS tablet " Take 1 tablet (100 mg) by mouth every 12 hours 60 tablet 1     lactulose (CHRONULAC) 10 GM/15ML solution Take 15 mLs (10 g) by mouth 2 times daily 946 mL 0     levETIRAcetam (KEPPRA) 1000 MG tablet Take 1 tablet (1,000 mg) by mouth every 24 hours 30 tablet 1     melatonin 3 MG tablet Take 1 tablet (3 mg) by mouth At Bedtime 30 tablet 1     midodrine (PROAMATINE) 10 MG tablet Take 1 tablet (10 mg) by mouth 3 times daily (with meals) 90 tablet 1     multivitamin RENAL (TRIPHROCAPS) 1 capsule capsule Take 1 capsule by mouth daily 30 capsule 1     nitroGLYcerin (NITROSTAT) 0.4 MG sublingual tablet For chest pain place 1 tablet under the tongue every 5 minutes for 3 doses. If symptoms persist 5 minutes after 1st dose call 911. 15 tablet 0     pantoprazole (PROTONIX) 40 MG EC tablet Take 1 tablet (40 mg) by mouth every morning (before breakfast) 30 tablet 1     ramelteon (ROZEREM) 8 MG tablet Take 1 tablet (8 mg) by mouth every evening 30 tablet 1     rifaximin (XIFAXAN) 550 MG TABS tablet Take 1 tablet (550 mg) by mouth 2 times daily 60 tablet 0     rOPINIRole (REQUIP) 0.5 MG tablet Take 1 tablet (0.5 mg) by mouth 2 times daily 60 tablet 2     sevelamer carbonate (RENVELA) 800 MG tablet Take 1 tablet (800 mg) by mouth 3 times daily (with meals) 90 tablet 0     tacrolimus (GENERIC EQUIVALENT) 1 MG capsule Take 1 capsule (1 mg) by mouth every 12 hours 60 capsule 1     traZODone (DESYREL) 50 MG tablet Take 25 mg by mouth At Bedtime       Past AEDs:      6/5/2023    11:26 AM   AED - ANTIEPILEPTIC DRUGS   Lacosamide 100 mg Q12H PO      levETIRAcetam 1,000 mg Q24H PO          Medication marked as long-term     Exam:    /76   Pulse 54   Temp 96.8  F (36  C) (Temporal)   Ht 6' (182.9 cm)   Wt 181 lb (82.1 kg)   BMI 24.55 kg/m       Wt Readings from Last 5 Encounters:   06/05/23 181 lb (82.1 kg)   05/30/23 186 lb 4.8 oz (84.5 kg)   05/04/23 190 lb (86.2 kg)   05/02/23 184 lb (83.5 kg)   04/26/23 181 lb 7 oz (82.3 kg)      GENERAL APPEARANCE:  Alert, awake, cooperative and pleasant,  NAD.  NEUROLOGICAL EXAMINATION:   MENTAL STATUS:   Alert, awake, oriented to person and place, partially oriented to time; he says it is Monday, 6/9/2023.    LANGUAGE/SPEECH:  No aphasia or dysarthria.   CRANIAL NERVES:  Pupils are round and reactive to light.  Extraocular movements are intact.  Facial sensation is intact to light touch.  Face is symmetric.  Tongue is midline.  Shrug shoulder is normal.  Hearing is intact to voice.   MOTOR:  Normal tone, bulk and strength 5/5 with no pronator drift.   SENSATION:  Intact to light touch  COORDINATION:  Normal finger to nose.  No dysmetria or tremor.   REFLEXES:  DTRs 1+ symmetric.   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  Ictal EEG onset was not clear with one due to impedance check during seizure onset, and was left occipital and the other one.  Patient's MRI showed remote right parietal stroke and acute left frontotemporal and right frontal small embolic strokes.  The patient was in critical condition in the hospital, with septic shock, renal failure and likely electrolyte abnormalities.  He never had seizures prior to this hospital admission and no seizures were reported since discharge from the hospital.  He feels very tired on the current antiseizure medications and wants to come off the medications.  I discussed tapering levetiracetam to off and continue lacosamide for now.  Will reevaluate in 3 months and repeat an EEG.  If he did not have any further seizures, may consider tapering lacosamide to off.    -Decrease levetiracetam to 500 mg daily for 1 week and then discontinue    -Continue lacosamide as before.    -Obtain lacosamide level for efficacy and compliance.    -Follow-up in 3  months.        As described above, I met with the patient and family member for 50 minutes and during this time counseling was greater than 50% of the visit time.  I spent an additional 20 minutes on chart review and 15 minutes on documentation. This note was created in part by the use of Dragon voice recognition system. Inadvertent grammatical errors and typographical errors may still exist.  Reba Laughlin MD

## 2023-06-05 NOTE — LETTER
"2023       RE: Blanco Osborne  : 1965   MRN: [id number]      Dear Colleague,    Thank you for referring your patient, Blanco Osborne, to the [facility] EPILEPSY CARE at [facility]. Please see a copy of my visit note below.    [facility] Epilepsy Care History and Physical       Patient:  Blanco Osborne  :  1965   Age:  57 year old   Today's Office Visit:  2023    Referring Provider:    Ki Carter PA-C  [address]  [place] [address]    History of Present Illness:   Mr. Blanco Osborne is a pleasant 57-year-old right-handed male with history of NAFLD, S/p Liver Transplant 2016 who presents to this clinic for evaluation and management of his seizures.  He is accompoanied by his brother, Dylan who contributes to the history.  The patient was admitted to the hospital on 2022 with acute respiratory failure due to strep pneumonia and parainfluenza in the setting of being immunosuppressed host given liver transplant and immunosuppresants.  He had septic shock.    Blanco does not recall much of the day of admission.  According to his brother, Dylan, he was not feeling well prior to admission to the hospital, with cold symptoms.  He was confused.  They called 911.  He collapsed when he wanted to walk to the ambualnce.  He had respiratory arrest.  He had 20 minutes CPR and shock and ROSC achieved.     Hospital notes: \"He had acute respiratory failure which slowly improved over time. Complicating factor is that he failed extubation twice. He had a right sided chest tube which was removed but then replaced when pneumothorax recurred.\"    \"He developed acute renal failure due to this illness and is dialysis dependent as of this note.\" He is currently on dialysis M, W, F.   He was transferred to [facility] on trach and PEG and 30% oxygen.    Prolonged video EEG was performed - at [facility].  The " "patient had 2 seizures during this monitoring.  First seizure occurred on 12/20.  He had gaze deviation to the right followed by head turn to the right followed by facial twitching that progressed to shoulder twitching on the right and then whole body low amplitude twitching.  Ictal EEG onset was unclear since it happened during impedance checks.  The patient had another seizure on 12/21.  It started with gaze deviation to the right and head turn to the right followed by grimacing, stiffening and head jerks and right shoulder delivered followed by low amplitude body jerk and whole body twitching.  The seizure onset was left occipital region.    He was started on lacosamide and levetiracetam.  He is currently taking lacosamide 100 mg twice daily and levetiracetam 1000 mg daily. Medication side effects: Feeling tired and itchy.  Neither patient or his family noted any seizures since discharge from the hospital.  He never had any seizures in the past that they are aware of.    Epilepsy Risk Factors: Patient denies history of febrile seizures, meningitis, encephalitis, significant head injury or family history of epilepsy.  His seizure occurred in the setting of acute respiratory failure, septic shock and renal failure.  His MRI 12/16/2022 showed chronic right parietal encephalomalacia and multiple lacunar strokes in the left frontal, left temporal and posterior right frontal lobe, consistent with embolic strokes.  Past medical history:   Cirrhosis status post liver transplant 2016  End stage kidney disease on hemodialysis   Seizure disorder   Acute respiratory failure requiring tracheostomy   Pneumonia   ARDS   Right pneumothorax   Possible splenic infract   Chronic immunosuppression  Subacute bacterial peritonitis (SBP)  Paroxysmal atrial fibrillation  On chronic anticoagulation  Embolic strokes  PEA arrest   Anxiety  Restless legs syndrome     Brain MRI 12/16/2022 showed \"tiny recent ischemic infarcts in the left " "frontal lobe, left temporal lobe and posterior right frontal lobe consistent with embolic infarcts from a central embolic source. A small area of chronic encephalomalacia at the high posterior right parietal lobe possibly due to old traumatic or ischemic injury. 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    NAFLD (nonalcoholic fatty liver disease) 2/11/2016    CHRISTIAN on CPAP     Parainfluenza type 1 infection 11/19/2022    SBP (spontaneous bacterial peritonitis) (H)     [facility]    Sepsis due to Streptococcus pneumoniae with acute hypoxic respiratory failure (H) 11/19/2022    Tubular adenoma 10/2019    Large cecal adenoma- due for surveillance colonoscopy in 3 years (10/2022)      Past Surgical History:   Procedure Laterality Date    APPENDECTOMY      BENCH LIVER N/A 9/20/2016    Procedure: BENCH LIVER;  Surgeon: Enoc Crews MD;  Location: [facility] OR    BRONCHOSCOPY FLEXIBLE AND RIGID N/A 12/20/2022    Procedure: BRONCHOSCOPY;  Surgeon: Alena Valenzuela MD;  Location:  GI    COLONOSCOPY  8/6/13    repeat in 2018    COLONOSCOPY N/A 10/4/2019    Procedure: COLONOSCOPY, WITH POLYPECTOMY AND BIOPSY;  Surgeon: Go Chong MD;  Location:  OR    CREATE FISTULA ARTERIOVENOUS UPPER EXTREMITY Left 3/21/2023    Procedure: LEFT UPPER EXTREMITY ARTERIOVENOUS FISTULA CREATION. LIGATION OF COMPETING VASCULAR BRANCHES- LEFT;  Surgeon: Deejay Mcneil MD;  Location:  OR    HERNIA REPAIR      IR CHEST TUBE PLACEMENT NON-TUNNELED RIGHT  12/12/2022    IR CVC TUNNEL PLACEMENT > 5 " "YRS OF AGE  2022    IR GASTROSTOMY TUBE CHANGE  2023    IR GASTROSTOMY TUBE PERCUTANEOUS PLCMNT  2022    IR PARACENTESIS  2022    IR PARACENTESIS  2022    IR THORACENTESIS  2022    IR THORACENTESIS  2020    IR THORACENTESIS  8/10/2020    IR TRANSCATHETER BIOPSY  2022    TRACHEOSTOMY N/A 2022    Procedure: TRACHEOSTOMY;  Surgeon: Nilesh Jackson MD;  Location: [facility] OR    TRANSPLANT LIVER RECIPIENT  DONOR N/A 2016    Procedure: TRANSPLANT LIVER RECIPIENT  DONOR;  Surgeon: Enoc Crews MD;  Location: [facility] OR     No family history on file.   Social History     Socioeconomic History    Marital status:      Spouse name: None    Number of children: None    Years of education: None    Highest education level: None   Tobacco Use    Smoking status: Never    Smokeless tobacco: Never   Vaping Use    Vaping status: Never Used   Substance and Sexual Activity    Alcohol use: Not Currently     Alcohol/week: 0.0 standard drinks of alcohol    Drug use: No      Employment/School: He worked as .   Previous Evaluations for Epilepsy:   EEG: SUMMARY OF FOUR DAYS OF VIDEO EEG -: \"Four day of Video EEG is abnormal due to presence of generalized delta theta slowing consistent with a moderate to severe encephalopathy. On Video EEG day 3 and 4 he had posterior dominant rhythm 6-7 hz. Patient had 2 electroclinical seizure (day 1 and 2) .  Clinically he had gaze deviation to the right, head turn to the right, facial twitching, that progresses to shoulder twitching on the right side, head jerks, then whole body low amplitude twitching.  Electrographically at onset of seizure on Video EEG day 1 there was an impedance check and we are not able to localize seizure onset. Day 2 seizure had left occipital focal onset.\"      Current Outpatient Medications   Medication Sig Dispense Refill    apixaban ANTICOAGULANT (ELIQUIS) 5 MG tablet " Take 1 tablet (5 mg) by mouth 2 times daily 60 tablet 1    hydrOXYzine (ATARAX) 25 MG tablet Take 1 tablet (25 mg) by mouth 3 times daily as needed for itching 30 tablet 2    Lacosamide (VIMPAT) 100 MG TABS tablet Take 1 tablet (100 mg) by mouth every 12 hours 60 tablet 1    lactulose (CHRONULAC) 10 GM/15ML solution Take 15 mLs (10 g) by mouth 2 times daily 946 mL 0    levETIRAcetam (KEPPRA) 1000 MG tablet Take 1 tablet (1,000 mg) by mouth every 24 hours 30 tablet 1    melatonin 3 MG tablet Take 1 tablet (3 mg) by mouth At Bedtime 30 tablet 1    midodrine (PROAMATINE) 10 MG tablet Take 1 tablet (10 mg) by mouth 3 times daily (with meals) 90 tablet 1    multivitamin RENAL (TRIPHROCAPS) 1 capsule capsule Take 1 capsule by mouth daily 30 capsule 1    nitroGLYcerin (NITROSTAT) 0.4 MG sublingual tablet For chest pain place 1 tablet under the tongue every 5 minutes for 3 doses. If symptoms persist 5 minutes after 1st dose call 911. 15 tablet 0    pantoprazole (PROTONIX) 40 MG EC tablet Take 1 tablet (40 mg) by mouth every morning (before breakfast) 30 tablet 1    ramelteon (ROZEREM) 8 MG tablet Take 1 tablet (8 mg) by mouth every evening 30 tablet 1    rifaximin (XIFAXAN) 550 MG TABS tablet Take 1 tablet (550 mg) by mouth 2 times daily 60 tablet 0    rOPINIRole (REQUIP) 0.5 MG tablet Take 1 tablet (0.5 mg) by mouth 2 times daily 60 tablet 2    sevelamer carbonate (RENVELA) 800 MG tablet Take 1 tablet (800 mg) by mouth 3 times daily (with meals) 90 tablet 0    tacrolimus (GENERIC EQUIVALENT) 1 MG capsule Take 1 capsule (1 mg) by mouth every 12 hours 60 capsule 1    traZODone (DESYREL) 50 MG tablet Take 25 mg by mouth At Bedtime       Past AEDs:      6/5/2023    11:26 AM   AED - ANTIEPILEPTIC DRUGS   Lacosamide 100 mg Q12H PO      levETIRAcetam 1,000 mg Q24H PO          Medication marked as long-term     Exam:    /76   Pulse 54   Temp 96.8  F (36  C) (Temporal)   Ht 6' (182.9 cm)   Wt 181 lb (82.1 kg)   BMI 24.55  kg/m       Wt Readings from Last 5 Encounters:   06/05/23 181 lb (82.1 kg)   05/30/23 186 lb 4.8 oz (84.5 kg)   05/04/23 190 lb (86.2 kg)   05/02/23 184 lb (83.5 kg)   04/26/23 181 lb 7 oz (82.3 kg)     GENERAL APPEARANCE:  Alert, awake, cooperative and pleasant,  NAD.  NEUROLOGICAL EXAMINATION:   MENTAL STATUS:   Alert, awake, oriented to person and place, partially oriented to time; he says it is Monday, 6/9/2023.    LANGUAGE/SPEECH:  No aphasia or dysarthria.   CRANIAL NERVES:  Pupils are round and reactive to light.  Extraocular movements are intact.  Facial sensation is intact to light touch.  Face is symmetric.  Tongue is midline.  Shrug shoulder is normal.  Hearing is intact to voice.   MOTOR:  Normal tone, bulk and strength 5/5 with no pronator drift.   SENSATION:  Intact to light touch  COORDINATION:  Normal finger to nose.  No dysmetria or tremor.   REFLEXES:  DTRs 1+ symmetric.   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  Ictal EEG onset was not clear with one due to impedance check during seizure onset, and was left occipital and the other one.  Patient's MRI showed remote right parietal stroke and acute left frontotemporal and right frontal small embolic strokes.  The patient was in critical condition in the hospital, with septic shock, renal failure and likely electrolyte abnormalities.  He never had seizures prior to this hospital admission and no seizures were reported since discharge from the hospital.  He feels very tired on the current antiseizure medications and wants to come off the medications.  I discussed tapering levetiracetam to off and continue lacosamide for now.  Will reevaluate in 3 months and repeat an EEG.  If he did not have any further seizures, may consider tapering lacosamide to  off.    -Decrease levetiracetam to 500 mg daily for 1 week and then discontinue    -Continue lacosamide as before.    -Obtain lacosamide level for efficacy and compliance.    -Follow-up in 3 months.        As described above, I met with the patient and family member for 50 minutes and during this time counseling was greater than 50% of the visit time.  I spent an additional 20 minutes on chart review and 15 minutes on documentation. This note was created in part by the use of Dragon voice recognition system. Inadvertent grammatical errors and typographical errors may still exist.        Again, thank you for allowing me to participate in the care of your patient.      Sincerely,    Reba Laughlin MD

## 2023-06-06 ENCOUNTER — TELEPHONE (OUTPATIENT)
Dept: FAMILY MEDICINE | Facility: CLINIC | Age: 58
End: 2023-06-06
Payer: COMMERCIAL

## 2023-06-06 DIAGNOSIS — L98.9 SKIN LESION: Primary | ICD-10-CM

## 2023-06-06 NOTE — TELEPHONE ENCOUNTER
PRIOR AUTHORIZATION DENIED    Medication: TRIPHROCAPS 1 MG PO CAPS  Insurance Company: [employer] - Phone [phone] Fax [phone]  Denial Date: 6/2/2023  Denial Rational:     Appeal Information:     Patient Notified: Yes

## 2023-06-06 NOTE — TELEPHONE ENCOUNTER
"TO PCP:     Pt's spouse on C2C called     Pt has a \"strange-looking\" lesion on his face with a black center he is trying to get in with dermatology to have examined     He noticed it about 2 months ago     Unable to get in until Sept/Oct.     She is very concerned about lesion \"no one wanted to touch it in the hospital\" and also concerned as he's taking liver immunosuppressant medications.     She is asking if provider would enter a new referral for an urgent dermatology consult so he can get in sooner     Laura ATKINS Triage RN  [facility]     "

## 2023-06-07 ENCOUNTER — TELEPHONE (OUTPATIENT)
Dept: FAMILY MEDICINE | Facility: CLINIC | Age: 58
End: 2023-06-07
Payer: COMMERCIAL

## 2023-06-07 NOTE — TELEPHONE ENCOUNTER
This encounter is being sent to inform the clinic that this patient has a referral from Ki Carter PA-C for the diagnoses of Skin lesion and has requested that this patient be seen within 1-2 weeks.  Based on the availability of our provider(s), we are unable to accommodate this request.      Were all sites offered this patient?  Yes      Does scheduling algorithm request to schedule next available?  Patient has been scheduled for the first available opening with JEANCARLOS Swann on 10/26.  We have informed the patient that the clinic will review their referral and reach out if a sooner appointment is medically necessary.

## 2023-06-07 NOTE — TELEPHONE ENCOUNTER
S/w wife Ofe (CTC in chart) and offered appt tomorrow 6/8 at 8 am with Dilcia and wife declined stating she has to work.  She also scheduled an appt with Dr. Ford outside of [place] for next week to be seen and cancelled appt with Dilcia Swann for October.    Aura MEJIA RN  [facility] [place]  [phone]

## 2023-06-07 NOTE — TELEPHONE ENCOUNTER
Ki Carter, WALDO  Cs Triage Im 11 hours ago (9:04 PM)     MG  Signed derm referral - can give them the number.[facility].   If not, [facility] or [facility]      Called back and discussed with pt's spouse, gave her scheduling numbers for the dermatology clinics     FYI - pt saw neurology and they tapering pt off the levetiracetam and continuing lacosamide for now (can see OV notes)     Laura ATKINS, Triage RN  [facility]

## 2023-06-08 ENCOUNTER — MEDICAL CORRESPONDENCE (OUTPATIENT)
Dept: HEALTH INFORMATION MANAGEMENT | Facility: CLINIC | Age: 58
End: 2023-06-08

## 2023-06-08 LAB — LACOSAMIDE SERPL-MCNC: 6.7 UG/ML

## 2023-06-09 ENCOUNTER — TELEPHONE (OUTPATIENT)
Dept: FAMILY MEDICINE | Facility: CLINIC | Age: 58
End: 2023-06-09
Payer: COMMERCIAL

## 2023-06-09 DIAGNOSIS — L29.9 ITCHING: ICD-10-CM

## 2023-06-09 DIAGNOSIS — I95.3 HEMODIALYSIS-ASSOCIATED HYPOTENSION: ICD-10-CM

## 2023-06-09 RX ORDER — HYDROXYZINE HYDROCHLORIDE 25 MG/1
25 TABLET, FILM COATED ORAL 4 TIMES DAILY PRN
Qty: 120 TABLET | Refills: 0 | Status: SHIPPED | OUTPATIENT
Start: 2023-06-09 | End: 2023-06-20

## 2023-06-09 RX ORDER — MIDODRINE HYDROCHLORIDE 10 MG/1
TABLET ORAL
Qty: 150 TABLET | Refills: 1 | Status: ON HOLD | OUTPATIENT
Start: 2023-06-09 | End: 2023-10-12

## 2023-06-09 NOTE — TELEPHONE ENCOUNTER
Patient's wife Ofe calling (on C2C) to request possible medication adjustment. Ofe stated that during patient's dialysis days , Monday, Wednesday, Friday, patient takes 2 extra Midodrine one hour before dialysis, takes 1 more Midodrine when he gets there, and takes 1 more tablet during dialysis, this is an addition to patient's scheduled doses. Ofe is requesting patient's script be updated to include these extra doses.     Ofe is also requesting Hydroxyzine script be updated to allow patient to take 4 times PRN instead of 3 times. Ofe stated patient will be out of both medications soon due to taking extra of both.     Routing to PCP for review. Scripts and pharmacy pended.

## 2023-06-12 ENCOUNTER — TELEPHONE (OUTPATIENT)
Dept: GASTROENTEROLOGY | Facility: CLINIC | Age: 58
End: 2023-06-12
Payer: COMMERCIAL

## 2023-06-12 NOTE — TELEPHONE ENCOUNTER
no VM set up & sent mychart // pt needs to reschedule appt on 8.25.23 with Dr. Simms, next available // first attempt, AN 6.12.23

## 2023-06-13 ENCOUNTER — MEDICAL CORRESPONDENCE (OUTPATIENT)
Dept: HEALTH INFORMATION MANAGEMENT | Facility: CLINIC | Age: 58
End: 2023-06-13
Payer: COMMERCIAL

## 2023-06-15 ENCOUNTER — TRANSFERRED RECORDS (OUTPATIENT)
Dept: HEALTH INFORMATION MANAGEMENT | Facility: CLINIC | Age: 58
End: 2023-06-15

## 2023-06-20 ENCOUNTER — VIRTUAL VISIT (OUTPATIENT)
Dept: PHARMACY | Facility: CLINIC | Age: 58
End: 2023-06-20
Attending: PHYSICIAN ASSISTANT
Payer: COMMERCIAL

## 2023-06-20 DIAGNOSIS — R18.8 CIRRHOSIS OF LIVER WITH ASCITES, UNSPECIFIED HEPATIC CIRRHOSIS TYPE (H): ICD-10-CM

## 2023-06-20 DIAGNOSIS — I46.9 PEA (PULSELESS ELECTRICAL ACTIVITY) (H): ICD-10-CM

## 2023-06-20 DIAGNOSIS — Z94.4 LIVER TRANSPLANTED (H): ICD-10-CM

## 2023-06-20 DIAGNOSIS — G25.81 RESTLESS LEGS SYNDROME (RLS): ICD-10-CM

## 2023-06-20 DIAGNOSIS — G47.00 INSOMNIA, UNSPECIFIED TYPE: ICD-10-CM

## 2023-06-20 DIAGNOSIS — R56.9 SEIZURES (H): ICD-10-CM

## 2023-06-20 DIAGNOSIS — I48.20 CHRONIC ATRIAL FIBRILLATION (H): ICD-10-CM

## 2023-06-20 DIAGNOSIS — K74.60 CIRRHOSIS OF LIVER WITH ASCITES, UNSPECIFIED HEPATIC CIRRHOSIS TYPE (H): ICD-10-CM

## 2023-06-20 DIAGNOSIS — Z99.2 ESRD (END STAGE RENAL DISEASE) ON DIALYSIS (H): Primary | ICD-10-CM

## 2023-06-20 DIAGNOSIS — N18.6 ESRD (END STAGE RENAL DISEASE) ON DIALYSIS (H): Primary | ICD-10-CM

## 2023-06-20 DIAGNOSIS — Z53.9 DIAGNOSIS NOT YET DEFINED: Primary | ICD-10-CM

## 2023-06-20 PROCEDURE — G0180 MD CERTIFICATION HHA PATIENT: HCPCS | Performed by: PHYSICIAN ASSISTANT

## 2023-06-20 PROCEDURE — 99207 PR NO CHARGE LOS: CPT | Performed by: PHARMACIST

## 2023-06-20 RX ORDER — SEVELAMER CARBONATE 800 MG/1
800 TABLET, FILM COATED ORAL 4 TIMES DAILY
COMMUNITY
End: 2024-03-06

## 2023-06-20 RX ORDER — TRAZODONE HYDROCHLORIDE 50 MG/1
25 TABLET, FILM COATED ORAL
COMMUNITY
End: 2023-06-20

## 2023-06-20 RX ORDER — TRAZODONE HYDROCHLORIDE 50 MG/1
25-50 TABLET, FILM COATED ORAL
Qty: 30 TABLET | Refills: 0 | Status: SHIPPED | OUTPATIENT
Start: 2023-06-20 | End: 2023-07-21

## 2023-06-20 RX ORDER — HYDROXYZINE HYDROCHLORIDE 25 MG/1
25 TABLET, FILM COATED ORAL 3 TIMES DAILY PRN
Qty: 90 TABLET | Refills: 0 | Status: SHIPPED | OUTPATIENT
Start: 2023-06-20 | End: 2023-08-04

## 2023-06-20 NOTE — PATIENT INSTRUCTIONS
"Recommendations from today's MTM visit:                                                    MTM (medication therapy management) is a service provided by a clinical pharmacist designed to help you get the most of out of your medicines.   Today we reviewed what your medicines are for, how to know if they are working, that your medicines are safe and how to make your medicine regimen as easy as possible.      1.  Continue current medication regimen.      2.  Recommended connecting with hepatology regarding lactulose.    3.  Requested refills provided    Follow-up: Return in about 4 months (around 10/20/2023) for Follow-up Medication Review.    It was great speaking with you today.  I value your experience and would be very thankful for your time in providing feedback in our clinic survey. In the next few days, you may receive an email or text message from [employer] with a link to a survey related to your  clinical pharmacist.\"     To schedule another MTM appointment, please call the clinic directly or you may call the MTM scheduling line at [phone] or toll-free at [phone].     My Clinical Pharmacist's contact information:                                                      Please feel free to contact me with any questions or concerns you have.      Jenise Hooker, Jed, [facility] Hospital[facility]  Medication Therapy Management Provider  Pager: [phone]    "

## 2023-06-20 NOTE — PROGRESS NOTES
"Medication Therapy Management (MTM) Encounter    ASSESSMENT:                            Medication Adherence/Access: See below for considerations    ESRD on Dialysis: Stable.    Cirrhosis of Liver with Ascites/H/o Liver Transplant, 2016: Should follow-up with hepatology about not using lactulose.  Plan in place.  Requests refill of hydroxyzine with three times daily dosing.    Focal Epilepsy: Stable.    A. Fib/PEA:  Stable.  Eliquis dose is appropriate (patient does not meet 2 of the required criteria for dose reduction).    RLS:  Stable.    Insomnia:  Stable, requests refill of trazodone.    PLAN:                            1.  Continue current medication regimen.    2.  Recommended connecting with hepatology regarding lactulose.  3.  Requested refills provided    Follow-up: Return in about 4 months (around 10/20/2023) for Follow-up Medication Review.    SUBJECTIVE/OBJECTIVE:                          Blanco Osborne is a 58 year old male called for an initial visit. He was referred to me from Ki Carter PA-C.  Blanco's wife, Ofe, spoke on his behalf today with his verbal authorization.    Reason for visit: Comprehensive medication review.    Allergies/ADRs: Reviewed in chart  Past Medical History: Reviewed in chart  Tobacco: He reports that he has never smoked. He has never used smokeless tobacco.  Alcohol: not currently using    Medication Adherence/Access: No issues reported other than noted below.  Ofe manages Blanco's medications for him.    History:  Patient was hospitalized 1/21/2023-4/28/2023 at [facility].  Per hospital discharge summary:  \"Blanco Osborne is a 57 year-old male admitted on 1/21/2023 as a transfer from [facility] for evaluation of loculated pleural effusion. He has extensive PMH including h/o liver transplant 2016 due to alcohol abuse, pAF on chronic anticoagulation, history of diabetes mellitus type 2, CVA, hypertension, CHRISTIAN on BiPAP.  In 11/2022 he had respiratory " "arrest and was diagnosed with parainfluenza and strep pneumoniae and acute on chronic renal insufficiency, which ended with ESRD, needing dialysis initiation and also found to have cirrhosis of transplanted liver.\"     ESRD on Dialysis: Patient is doing dialysis 3x/week and Ofe reports this is overall going well.  He is taking Triphrocaps daily and sevelamer 800mg generally four times daily (prescribed for a total of 6 tablets per day), and midodrine 10mg three times daily plus additional doses as needed on dialysis days.  They've been working with nephrology to adjust dialysis to prevent hypotension as well.  Ofe reports folic acid has been discontinued due to elevated levels.  No side effects reported with current regimen.  Lab Results   Component Value Date    CR 5.91 (H) 04/28/2023    CR 4.90 (H) 04/27/2023    CR 3.56 (H) 04/26/2023    CR 5.77 (H) 04/25/2023    GFRESTIMATED 10 (L) 04/28/2023    GFRESTIMATED 13 (L) 04/27/2023    GFRESTIMATED 19 (L) 04/26/2023    GFRESTIMATED 11 (L) 04/25/2023       Cirrhosis of Liver with Ascites/H/o Liver Transplant, 2016:  Blanco is taking tacrolimus 1mg twice daily, hydroxyzine 25mg three times daily as needed for itching (rarely) and pantoprazole 40mg daily.  Ofe reports rifixamin was stopped by hepatology (due to cost) and Blanco has been refusing to take lactulose as he doesn't feel it's needed.  They deny any current signs and symptoms of encephalopathy.  No side effects reported.  Liver Function Studies - Recent Labs   Lab Test 04/28/23  0800 04/06/23  0808 04/05/23  0959   PROTTOTAL  --   --  7.4   ALBUMIN 3.6   < > 3.6   BILITOTAL  --   --  0.4   ALKPHOS  --   --  184*   AST  --   --  16   ALT  --   --  <5*    < > = values in this interval not displayed.      Focal Epilepsy:  Blanco has been working closely with neurology and was recently tapered off Keppra.  He continues taking lacosamide 100mg twice daily with no recent seizure activity.  No side effects " reported.    A. Fib/PEA:  Patient takes Eliquis 5mg twice daily.  Is not on additional therapy due to hypotension.  He has sublingual nitroglycerin available for use but has never needed to use.  No signs and symptoms of bruising/bleeding reported.    RLS:  Patient uses ropinirole 0.5mg twice daily as needed for RLS.  Ofe reports Blanco finds this effective, no side effects reported.    Insomnia: Ofe reports that this is Blanco's main complaint.  He's currently taking melatonin 3mg at bedtime and Rozerem 8mg at bedtime.  He has trazodone available for use but has not been using.  Ofe wonders what additionally can be done.  No side effects or daytime grogginess reported.    Today's Vitals: There were no vitals taken for this visit.  ----------------    I spent 26 minutes with this patient today. All changes were made via collaborative practice agreement with Ki Carter PA-C. A copy of the visit note was provided to the patient's provider(s).    A summary of these recommendations was sent via ralali.    Jenise Hooker, PharmD, BCACP  Medication Therapy Management Provider  Pager: [phone]     Telemedicine Visit Details  Type of service:  Telephone visit  Start Time: 9:05 AM  End Time: 9:31 AM     Medication Therapy Recommendations  No medication therapy recommendations to display

## 2023-06-27 ENCOUNTER — MEDICAL CORRESPONDENCE (OUTPATIENT)
Dept: HEALTH INFORMATION MANAGEMENT | Facility: CLINIC | Age: 58
End: 2023-06-27
Payer: COMMERCIAL

## 2023-06-28 ENCOUNTER — TELEPHONE (OUTPATIENT)
Dept: GASTROENTEROLOGY | Facility: CLINIC | Age: 58
End: 2023-06-28
Payer: COMMERCIAL

## 2023-06-28 ENCOUNTER — TELEPHONE (OUTPATIENT)
Dept: TRANSPLANT | Facility: CLINIC | Age: 58
End: 2023-06-28
Payer: COMMERCIAL

## 2023-06-28 NOTE — TELEPHONE ENCOUNTER
Call to Ofe.   I see that Blanco's appt w/ Dr. Simms got moved to December.  LM asking if things are going ok, get a general update.  Also asked Ofe to have post liver transplant labs w/ a good tac trough level when they can.   Asked her to call me with concerns or questions.

## 2023-06-28 NOTE — TELEPHONE ENCOUNTER
LVM // pt needs to reschedule the now cancelled appt with Dr. Simms on 8.25.23, next available // second attempt, AN 6.28.23

## 2023-06-29 ENCOUNTER — TELEPHONE (OUTPATIENT)
Dept: TRANSPLANT | Facility: CLINIC | Age: 58
End: 2023-06-29
Payer: COMMERCIAL

## 2023-06-29 DIAGNOSIS — Z13.220 LIPID SCREENING: ICD-10-CM

## 2023-06-29 DIAGNOSIS — Z94.4 LIVER REPLACED BY TRANSPLANT (H): Primary | ICD-10-CM

## 2023-06-29 DIAGNOSIS — Z53.9 DIAGNOSIS NOT YET DEFINED: Primary | ICD-10-CM

## 2023-06-29 PROCEDURE — 99207 PR MD CERTIFICATION HHA PATIENT: CPT | Performed by: PHYSICIAN ASSISTANT

## 2023-06-29 NOTE — LETTER
OUTPATIENT LABORATORY TEST ORDER   Patient copy    Patient Name: Blanco Osborne   YOB: 1965     [facility] MR# [if applicable]: [id number]   Date & Time: June 29, 2023  10:33 AM  Expiration Date: 1 year after date issued     Diagnosis: Liver Transplant (ICD-10 Z94.4)   Aftercare following organ transplant (ICD-10 Z48.288)   Long term use of medications (ICD-10 Z79.899)      We ask your assistance in obtaining the following laboratory tests, which are part of our routine surveillance program for Solid Organ Transplant patients.     Please fax each result to [phone], same day as resulted/available    Critical lab results page [phone]  Monday - Friday 8 am to 5 pm  Evening/Weekend/Holiday communicate Critical labs results [phone]    >Every 6-months post-transplant  Magnesium    >1-year post-transplant  Every 2-3 months and as needed  CBC with Platelets   Basic Metabolic Panel   Magnesium  Hepatic panel   Tacrolimus drug level - 12 hour trough, please document time of last dose     >1-year post-transplant  Labs annually due December 2023  Fasting Lipid Panel  Urine protein/creatinine ratio  UA with reflex to micro  Phosphorous    If you have any questions, please call [facility]- [phone] or [phone], Fax- [phone].    .

## 2023-06-29 NOTE — TELEPHONE ENCOUNTER
Spoke to Ofe who asks that pt's lab orders are updated as pt will be seeing Dr. Simms now. Orders updated.

## 2023-07-05 ENCOUNTER — TRANSFERRED RECORDS (OUTPATIENT)
Dept: HEALTH INFORMATION MANAGEMENT | Facility: CLINIC | Age: 58
End: 2023-07-05

## 2023-07-05 DIAGNOSIS — Z53.9 DIAGNOSIS NOT YET DEFINED: Primary | ICD-10-CM

## 2023-07-05 PROCEDURE — G0179 MD RECERTIFICATION HHA PT: HCPCS | Performed by: PHYSICIAN ASSISTANT

## 2023-07-07 ENCOUNTER — TRANSFERRED RECORDS (OUTPATIENT)
Dept: HEALTH INFORMATION MANAGEMENT | Facility: CLINIC | Age: 58
End: 2023-07-07
Payer: COMMERCIAL

## 2023-07-11 ENCOUNTER — TRANSFERRED RECORDS (OUTPATIENT)
Dept: HEALTH INFORMATION MANAGEMENT | Facility: CLINIC | Age: 58
End: 2023-07-11

## 2023-07-11 ENCOUNTER — TELEPHONE (OUTPATIENT)
Dept: INTERVENTIONAL RADIOLOGY/VASCULAR | Facility: CLINIC | Age: 58
End: 2023-07-11

## 2023-07-11 ENCOUNTER — OFFICE VISIT (OUTPATIENT)
Dept: FAMILY MEDICINE | Facility: CLINIC | Age: 58
End: 2023-07-11
Payer: COMMERCIAL

## 2023-07-11 ENCOUNTER — TELEPHONE (OUTPATIENT)
Dept: FAMILY MEDICINE | Facility: CLINIC | Age: 58
End: 2023-07-11

## 2023-07-11 VITALS
TEMPERATURE: 97.6 F | HEART RATE: 63 BPM | BODY MASS INDEX: 25.87 KG/M2 | WEIGHT: 191 LBS | OXYGEN SATURATION: 95 % | DIASTOLIC BLOOD PRESSURE: 65 MMHG | HEIGHT: 72 IN | SYSTOLIC BLOOD PRESSURE: 104 MMHG | RESPIRATION RATE: 16 BRPM

## 2023-07-11 DIAGNOSIS — Z99.2 ESRD (END STAGE RENAL DISEASE) ON DIALYSIS (H): Primary | ICD-10-CM

## 2023-07-11 DIAGNOSIS — R53.81 PHYSICAL DECONDITIONING: ICD-10-CM

## 2023-07-11 DIAGNOSIS — Z94.4 LIVER REPLACED BY TRANSPLANT (H): ICD-10-CM

## 2023-07-11 DIAGNOSIS — R56.9 SEIZURES (H): ICD-10-CM

## 2023-07-11 DIAGNOSIS — N18.6 ESRD (END STAGE RENAL DISEASE) ON DIALYSIS (H): Primary | ICD-10-CM

## 2023-07-11 DIAGNOSIS — G47.00 INSOMNIA, UNSPECIFIED TYPE: ICD-10-CM

## 2023-07-11 LAB
ALBUMIN SERPL BCG-MCNC: 3.9 G/DL (ref 3.5–5.2)
ALP SERPL-CCNC: 197 U/L (ref 40–129)
ALT SERPL W P-5'-P-CCNC: 9 U/L (ref 0–70)
ANION GAP SERPL CALCULATED.3IONS-SCNC: 16 MMOL/L (ref 7–15)
AST SERPL W P-5'-P-CCNC: 19 U/L (ref 0–45)
BILIRUB DIRECT SERPL-MCNC: 0.25 MG/DL (ref 0–0.3)
BILIRUB SERPL-MCNC: 0.5 MG/DL
BUN SERPL-MCNC: 46.2 MG/DL (ref 8–23)
CALCIUM SERPL-MCNC: 9.1 MG/DL (ref 8.6–10)
CHLORIDE SERPL-SCNC: 99 MMOL/L (ref 98–107)
CREAT SERPL-MCNC: 5.56 MG/DL (ref 0.67–1.17)
DEPRECATED HCO3 PLAS-SCNC: 28 MMOL/L (ref 22–29)
ERYTHROCYTE [DISTWIDTH] IN BLOOD BY AUTOMATED COUNT: 14.4 % (ref 10–15)
GFR SERPL CREATININE-BSD FRML MDRD: 11 ML/MIN/1.73M2
GLUCOSE SERPL-MCNC: 100 MG/DL (ref 70–99)
HCT VFR BLD AUTO: 28.1 % (ref 40–53)
HGB BLD-MCNC: 8.7 G/DL (ref 13.3–17.7)
MCH RBC QN AUTO: 31.6 PG (ref 26.5–33)
MCHC RBC AUTO-ENTMCNC: 31 G/DL (ref 31.5–36.5)
MCV RBC AUTO: 102 FL (ref 78–100)
PLATELET # BLD AUTO: 101 10E3/UL (ref 150–450)
POTASSIUM SERPL-SCNC: 3.7 MMOL/L (ref 3.4–5.3)
PROT SERPL-MCNC: 7.1 G/DL (ref 6.4–8.3)
RBC # BLD AUTO: 2.75 10E6/UL (ref 4.4–5.9)
SODIUM SERPL-SCNC: 143 MMOL/L (ref 136–145)
TACROLIMUS BLD-MCNC: 5.7 UG/L (ref 5–15)
TME LAST DOSE: NORMAL H
TME LAST DOSE: NORMAL H
WBC # BLD AUTO: 3.8 10E3/UL (ref 4–11)

## 2023-07-11 PROCEDURE — 82248 BILIRUBIN DIRECT: CPT | Performed by: PHYSICIAN ASSISTANT

## 2023-07-11 PROCEDURE — 36415 COLL VENOUS BLD VENIPUNCTURE: CPT | Performed by: PHYSICIAN ASSISTANT

## 2023-07-11 PROCEDURE — 80197 ASSAY OF TACROLIMUS: CPT | Performed by: PHYSICIAN ASSISTANT

## 2023-07-11 PROCEDURE — 85027 COMPLETE CBC AUTOMATED: CPT | Performed by: PHYSICIAN ASSISTANT

## 2023-07-11 PROCEDURE — 80053 COMPREHEN METABOLIC PANEL: CPT | Performed by: PHYSICIAN ASSISTANT

## 2023-07-11 PROCEDURE — 99215 OFFICE O/P EST HI 40 MIN: CPT | Performed by: PHYSICIAN ASSISTANT

## 2023-07-11 ASSESSMENT — PAIN SCALES - GENERAL: PAINLEVEL: SEVERE PAIN (7)

## 2023-07-11 NOTE — PROGRESS NOTES
Assessment & Plan     ESRD (end stage renal disease) on dialysis (H)  Continue close follow-up with dialysis team.  Upcoming fistula procedure on 7/13/2023.  Continue same protocol with midodrine, ropinirole and anticipation of dialysis.  Happy to fill out disability paperwork for him.    Liver replaced by transplant (H)  Continue close follow-up with transplant team.  Labs today.  - Basic metabolic panel  - CBC with platelets  - Hepatic panel  - Tacrolimus by [employer] Mass [facility]rometry  - Physical Therapy Referral    Seizures (H)  Upcoming follow-up with neurology.  Seems as though the discontinuation of Keppra has improved his energy levels.  Less fatigued throughout the day.  No recurrence of symptoms.  - Physical Therapy Referral    Insomnia, unspecified type  Well-managed.  Continue melatonin nightly, trazodone as needed.  - Physical Therapy Referral    Physical deconditioning  Would benefit from continuing physical therapy outpatient to continue with balance/strength.  Avoidance of falls and regaining of independence is paramount.  - Physical Therapy Referral      45 minutes spent by me on the date of the encounter doing chart review, review of test results, interpretation of tests, patient visit and documentation          No follow-ups on file.   Follow-up Visit   Expected date:  Oct 11, 2023 (Approximate)      Follow Up Appointment Details:     Follow-up with whom?: Me    Follow-Up for what?: Chronic Disease f/u    Chronic Disease f/u: General (Other)    How?: In Person                 The likelihood of other entities in the differential is insufficient to justify any further testing for them at this time. This was explained to the patient. The patient was advised that persistent or worsening symptoms would require further evaluation. Patient advised to call the office and if unable to reach to go to the emergency room if they develop any new or worsening symptoms. Expressed understanding and agreement with  above stated plan.     WALDO [facility]    Subjective   Blanco Osborne is a 58 year old male presenting for the following health issues:  Patient presents with:  Follow Up  Wound Check: Right arm poss suture removal  Fall: About 5 days ago hurt left ribs and elbow  Forms: Insurance    Here today for regular scheduled follow-up.  Overall doing very well.  Ongoing follow-up with hepatology, dialysis team, neurology, as well as at home PT.    -Mobility and strength improving.  Will soon be discharged from home PT.  Discussed possibility of continuing physical therapy outpatient.    -Upcoming fistula procedure from his dialysis team.  Continues to experience dialysis related hypotension for which he takes midodrine.  Per patient dialysis team has discussed possibility of kidney transplant.    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  Sutures placed 5 days ago.  Due for return follow-up to dermatology to have these removed.    -Seeing Dr. Simms in Dec.  Labs placed by his team today.    -Recently discontinued Keppra, still on lacosamide given history of seizures.  Follow-up with neurology upcoming.    -Upcoming echo/Holter monitor given history of A-fib.  Currently on Eliquis 5 mg twice daily.  Question whether he could possibly decrease/discontinue Eliquis due to increased bleeding during dialysis.  Will await cardio results.    -Significant improvement in his insomnia.  Uses melatonin OTC nightly.  Does take trazodone on occasion as needed which is helpful.  Uses Requip typically with dialysis as he experiences RLS.    -Needs me to fill out his [employer] disability form for his life insurance.      Review of Systems   Constitutional, HEENT, cardiovascular, pulmonary, GI, , musculoskeletal, neuro, skin, endocrine and psych systems are negative, except as otherwise noted.      Objective    /65 (BP Location: Right arm, Patient  "Position: Sitting, Cuff Size: Adult Large)   Pulse 63   Temp 97.6  F (36.4  C) (Oral)   Resp 16   Ht 1.829 m (6')   Wt 86.6 kg (191 lb)   SpO2 95%   BMI 25.90 kg/m    6' 0\"  191 lbs 0 oz    No Known Allergies  Current Outpatient Medications   Medication Sig Dispense Refill     apixaban ANTICOAGULANT (ELIQUIS) 5 MG tablet Take 1 tablet (5 mg) by mouth 2 times daily 60 tablet 1     hydrOXYzine (ATARAX) 25 MG tablet Take 1 tablet (25 mg) by mouth 3 times daily as needed for itching 90 tablet 0     Lacosamide (VIMPAT) 100 MG TABS tablet Take 1 tablet (100 mg) by mouth every 12 hours 60 tablet 1     melatonin 3 MG tablet Take 1 tablet (3 mg) by mouth At Bedtime 30 tablet 1     midodrine (PROAMATINE) 10 MG tablet Take 1 tab 3xDay, during patient's dialysis days , Monday, Wednesday, Friday, patient takes 2 extra Midodrine one hour before dialysis, takes 1 more Midodrine when he gets there, and takes 1 more tablet during dialysis, this is an addition to patient's scheduled doses 150 tablet 1     multivitamin RENAL (TRIPHROCAPS) 1 capsule capsule Take 1 capsule by mouth daily 30 capsule 1     nitroGLYcerin (NITROSTAT) 0.4 MG sublingual tablet For chest pain place 1 tablet under the tongue every 5 minutes for 3 doses. If symptoms persist 5 minutes after 1st dose call 911. 15 tablet 0     pantoprazole (PROTONIX) 40 MG EC tablet Take 1 tablet (40 mg) by mouth every morning (before breakfast) 30 tablet 1     ramelteon (ROZEREM) 8 MG tablet Take 1 tablet (8 mg) by mouth every evening 30 tablet 1     rOPINIRole (REQUIP) 0.5 MG tablet Take 1 tablet (0.5 mg) by mouth 2 times daily 60 tablet 2     sevelamer carbonate (RENVELA) 800 MG tablet Take 1,600 mg by mouth 3 times daily (with meals)       tacrolimus (GENERIC EQUIVALENT) 1 MG capsule Take 1 capsule (1 mg) by mouth every 12 hours 60 capsule 1     traZODone (DESYREL) 50 MG tablet Take 0.5-1 tablets (25-50 mg) by mouth nightly as needed for sleep 30 tablet 0     Past " Medical History:   Diagnosis Date     Acquired immunocompromised state (H) 11/19/2022     Ascites      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     Cirrhosis of liver with ascites (H) 2/11/2016     H/O alcohol abuse      HTN (hypertension)      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     NAFLD (nonalcoholic fatty liver disease) 2/11/2016     CHRISTIAN on CPAP      Parainfluenza type 1 infection 11/19/2022     SBP (spontaneous bacterial peritonitis) (H)     [facility]     Sepsis due to Streptococcus pneumoniae with acute hypoxic respiratory failure (H) 11/19/2022     Tubular adenoma 10/2019    Large cecal adenoma- due for surveillance colonoscopy in 3 years (10/2022)     Past Surgical History:   Procedure Laterality Date     APPENDECTOMY       BENCH LIVER N/A 9/20/2016    Procedure: BENCH LIVER;  Surgeon: Enoc Crews MD;  Location: [facility] OR     BRONCHOSCOPY FLEXIBLE AND RIGID N/A 12/20/2022    Procedure: BRONCHOSCOPY;  Surgeon: Alena Valenzuela MD;  Location:  GI     COLONOSCOPY  8/6/13    repeat in 2018     COLONOSCOPY N/A 10/4/2019    Procedure: COLONOSCOPY, WITH POLYPECTOMY AND BIOPSY;  Surgeon: Go Chong MD;  Location: [facility] OR     CREATE FISTULA ARTERIOVENOUS UPPER EXTREMITY Left 3/21/2023    Procedure: LEFT UPPER EXTREMITY ARTERIOVENOUS FISTULA CREATION. LIGATION OF COMPETING VASCULAR BRANCHES- LEFT;  Surgeon: Deejay Mcneil MD;  Location:  OR     HERNIA REPAIR       IR CHEST TUBE PLACEMENT NON-TUNNELED RIGHT  12/12/2022     IR CVC TUNNEL PLACEMENT > 5 YRS OF AGE  12/6/2022     IR GASTROSTOMY TUBE CHANGE  2/8/2023     IR GASTROSTOMY TUBE PERCUTANEOUS PLCMNT  11/29/2022     IR PARACENTESIS  11/29/2022     IR PARACENTESIS  12/1/2022     IR THORACENTESIS  12/6/2022     IR THORACENTESIS  9/1/2020     IR THORACENTESIS  8/10/2020     IR TRANSCATHETER BIOPSY  12/1/2022     TRACHEOSTOMY N/A 11/29/2022    Procedure: TRACHEOSTOMY;  Surgeon: Nilesh Jackson MD;  Location:  OR      TRANSPLANT LIVER RECIPIENT  DONOR N/A 2016    Procedure: TRANSPLANT LIVER RECIPIENT  DONOR;  Surgeon: Enoc Crews MD;  Location: [facility] OR       Physical Exam   GENERAL: healthy, alert and no distress  EYES: Eyes grossly normal to inspection, PERRL and conjunctivae and sclerae normal  NECK: no adenopathy, no asymmetry, masses, or scars and thyroid normal to palpation  RESP: lungs clear to auscultation - no rales, rhonchi or wheezes  CV: regular rate and rhythm, normal S1 S2, no S3 or S4, no murmur, click or rub, no peripheral edema and peripheral pulses strong  ABDOMEN: soft, nontender, no hepatosplenomegaly, no masses  MS: no gross musculoskeletal defects noted, no edema  SKIN: no suspicious lesions or rashes.  Sutures intact right forearm without signs of infection.  NEURO: Normal strength and tone, mentation intact and speech normal  PSYCH: mentation appears normal, affect normal/bright

## 2023-07-13 ENCOUNTER — APPOINTMENT (OUTPATIENT)
Dept: INTERVENTIONAL RADIOLOGY/VASCULAR | Facility: CLINIC | Age: 58
End: 2023-07-13
Attending: INTERNAL MEDICINE
Payer: COMMERCIAL

## 2023-07-13 ENCOUNTER — HOSPITAL ENCOUNTER (OUTPATIENT)
Facility: CLINIC | Age: 58
Discharge: HOME | End: 2023-07-13
Admitting: INTERNAL MEDICINE
Payer: COMMERCIAL

## 2023-07-13 ENCOUNTER — APPOINTMENT (OUTPATIENT)
Dept: ULTRASOUND IMAGING | Facility: CLINIC | Age: 58
End: 2023-07-13
Attending: SURGERY
Payer: COMMERCIAL

## 2023-07-13 ENCOUNTER — REFERRAL (OUTPATIENT)
Dept: TRANSPLANT | Facility: CLINIC | Age: 58
End: 2023-07-13

## 2023-07-13 VITALS
HEIGHT: 76 IN | HEART RATE: 65 BPM | OXYGEN SATURATION: 98 % | WEIGHT: 185 LBS | SYSTOLIC BLOOD PRESSURE: 105 MMHG | RESPIRATION RATE: 12 BRPM | DIASTOLIC BLOOD PRESSURE: 63 MMHG | TEMPERATURE: 97.1 F | BODY MASS INDEX: 22.53 KG/M2

## 2023-07-13 DIAGNOSIS — N18.6 ESRD (END STAGE RENAL DISEASE) (H): Primary | ICD-10-CM

## 2023-07-13 DIAGNOSIS — Z94.4 STATUS POST LIVER TRANSPLANT (H): ICD-10-CM

## 2023-07-13 DIAGNOSIS — Z76.82 ORGAN TRANSPLANT CANDIDATE: ICD-10-CM

## 2023-07-13 DIAGNOSIS — N18.6 ESRD (END STAGE RENAL DISEASE) (H): ICD-10-CM

## 2023-07-13 DIAGNOSIS — I10 ESSENTIAL HYPERTENSION: ICD-10-CM

## 2023-07-13 DIAGNOSIS — G47.33 OBSTRUCTIVE SLEEP APNEA: ICD-10-CM

## 2023-07-13 DIAGNOSIS — K76.89 LIVER DYSFUNCTION: ICD-10-CM

## 2023-07-13 DIAGNOSIS — Z85.828 HISTORY OF SKIN CANCER: ICD-10-CM

## 2023-07-13 DIAGNOSIS — Z01.818 PRE-TRANSPLANT EVALUATION FOR KIDNEY TRANSPLANT: ICD-10-CM

## 2023-07-13 DIAGNOSIS — N18.6 END STAGE RENAL DISEASE (H): ICD-10-CM

## 2023-07-13 DIAGNOSIS — J98.4 DISEASE OF LUNG: ICD-10-CM

## 2023-07-13 LAB
ANION GAP SERPL CALCULATED.3IONS-SCNC: 12 MMOL/L (ref 7–15)
BUN SERPL-MCNC: 37.2 MG/DL (ref 8–23)
CALCIUM SERPL-MCNC: 9.4 MG/DL (ref 8.6–10)
CHLORIDE SERPL-SCNC: 97 MMOL/L (ref 98–107)
CREAT SERPL-MCNC: 5.13 MG/DL (ref 0.67–1.17)
DEPRECATED HCO3 PLAS-SCNC: 30 MMOL/L (ref 22–29)
GFR SERPL CREATININE-BSD FRML MDRD: 12 ML/MIN/1.73M2
GLUCOSE SERPL-MCNC: 94 MG/DL (ref 70–99)
POTASSIUM SERPL-SCNC: 4.1 MMOL/L (ref 3.4–5.3)
SODIUM SERPL-SCNC: 139 MMOL/L (ref 136–145)

## 2023-07-13 PROCEDURE — C1769 GUIDE WIRE: HCPCS

## 2023-07-13 PROCEDURE — 272N000196 HC ACCESSORY CR5

## 2023-07-13 PROCEDURE — 255N000002 HC RX 255 OP 636: Mod: JZ

## 2023-07-13 PROCEDURE — C1725 CATH, TRANSLUMIN NON-LASER: HCPCS

## 2023-07-13 PROCEDURE — 250N000009 HC RX 250: Performed by: PHYSICIAN ASSISTANT

## 2023-07-13 PROCEDURE — 272N000302 HC DEVICE INFLATION CR5

## 2023-07-13 PROCEDURE — 80048 BASIC METABOLIC PNL TOTAL CA: CPT | Performed by: NURSE PRACTITIONER

## 2023-07-13 PROCEDURE — 99214 OFFICE O/P EST MOD 30 MIN: CPT | Performed by: SURGERY

## 2023-07-13 PROCEDURE — 36415 COLL VENOUS BLD VENIPUNCTURE: CPT | Performed by: NURSE PRACTITIONER

## 2023-07-13 PROCEDURE — 250N000011 HC RX IP 250 OP 636: Mod: JZ | Performed by: NURSE PRACTITIONER

## 2023-07-13 PROCEDURE — 999N000163 HC STATISTIC SIMPLE TUBE INSERTION/CHARGE, PORT, CATH, FISTULOGRAM

## 2023-07-13 PROCEDURE — 272N000564 HC SHEATH CR2

## 2023-07-13 PROCEDURE — 76937 US GUIDE VASCULAR ACCESS: CPT

## 2023-07-13 PROCEDURE — 250N000011 HC RX IP 250 OP 636: Performed by: PHYSICIAN ASSISTANT

## 2023-07-13 PROCEDURE — 93971 EXTREMITY STUDY: CPT | Mod: LT

## 2023-07-13 PROCEDURE — 99152 MOD SED SAME PHYS/QHP 5/>YRS: CPT

## 2023-07-13 PROCEDURE — 36591 DRAW BLOOD OFF VENOUS DEVICE: CPT

## 2023-07-13 PROCEDURE — 36901 INTRO CATH DIALYSIS CIRCUIT: CPT

## 2023-07-13 RX ORDER — FLUMAZENIL 0.1 MG/ML
0.2 INJECTION, SOLUTION INTRAVENOUS
Status: DISCONTINUED | OUTPATIENT
Start: 2023-07-13 | End: 2023-07-13 | Stop reason: HOSPADM

## 2023-07-13 RX ORDER — FENTANYL CITRATE 50 UG/ML
25-50 INJECTION, SOLUTION INTRAMUSCULAR; INTRAVENOUS EVERY 5 MIN PRN
Status: DISCONTINUED | OUTPATIENT
Start: 2023-07-13 | End: 2023-07-13 | Stop reason: HOSPADM

## 2023-07-13 RX ORDER — IOPAMIDOL 612 MG/ML
100 INJECTION, SOLUTION INTRAVASCULAR ONCE
Status: COMPLETED | OUTPATIENT
Start: 2023-07-13 | End: 2023-07-13

## 2023-07-13 RX ORDER — HEPARIN SODIUM 200 [USP'U]/100ML
1 INJECTION, SOLUTION INTRAVENOUS CONTINUOUS PRN
Status: DISCONTINUED | OUTPATIENT
Start: 2023-07-13 | End: 2023-07-13 | Stop reason: HOSPADM

## 2023-07-13 RX ORDER — NALOXONE HYDROCHLORIDE 0.4 MG/ML
0.4 INJECTION, SOLUTION INTRAMUSCULAR; INTRAVENOUS; SUBCUTANEOUS
Status: DISCONTINUED | OUTPATIENT
Start: 2023-07-13 | End: 2023-07-13 | Stop reason: HOSPADM

## 2023-07-13 RX ORDER — NALOXONE HYDROCHLORIDE 0.4 MG/ML
0.2 INJECTION, SOLUTION INTRAMUSCULAR; INTRAVENOUS; SUBCUTANEOUS
Status: DISCONTINUED | OUTPATIENT
Start: 2023-07-13 | End: 2023-07-13 | Stop reason: HOSPADM

## 2023-07-13 RX ADMIN — MIDAZOLAM 0.5 MG: 1 INJECTION INTRAMUSCULAR; INTRAVENOUS at 11:10

## 2023-07-13 RX ADMIN — MIDAZOLAM 1 MG: 1 INJECTION INTRAMUSCULAR; INTRAVENOUS at 10:54

## 2023-07-13 RX ADMIN — FENTANYL CITRATE 50 MCG: 50 INJECTION, SOLUTION INTRAMUSCULAR; INTRAVENOUS at 11:01

## 2023-07-13 RX ADMIN — LIDOCAINE HYDROCHLORIDE 3 ML: 10 INJECTION, SOLUTION INFILTRATION; PERINEURAL at 11:03

## 2023-07-13 RX ADMIN — MIDAZOLAM 0.5 MG: 1 INJECTION INTRAMUSCULAR; INTRAVENOUS at 11:32

## 2023-07-13 RX ADMIN — FENTANYL CITRATE 25 MCG: 50 INJECTION, SOLUTION INTRAMUSCULAR; INTRAVENOUS at 11:32

## 2023-07-13 RX ADMIN — FENTANYL CITRATE 25 MCG: 50 INJECTION, SOLUTION INTRAMUSCULAR; INTRAVENOUS at 11:10

## 2023-07-13 RX ADMIN — FENTANYL CITRATE 50 MCG: 50 INJECTION, SOLUTION INTRAMUSCULAR; INTRAVENOUS at 11:43

## 2023-07-13 RX ADMIN — IOPAMIDOL 85 ML: 612 INJECTION, SOLUTION INTRAVENOUS at 11:56

## 2023-07-13 RX ADMIN — HEPARIN SODIUM 3 BAG: 200 INJECTION, SOLUTION INTRAVENOUS at 10:58

## 2023-07-13 ASSESSMENT — ACTIVITIES OF DAILY LIVING (ADL)
ADLS_ACUITY_SCORE: 35

## 2023-07-13 NOTE — LETTER
July 25, 2023    Blanco Osborne  [address]  [place] [address]          Dear Blanco,    Thank you for your interest in the Transplant Center at [facility]. We look forward to being a part of your care team and assisting you through the transplant process.    As we discussed, your transplant coordinator is Tamara Abel (Kidney). You may call your coordinator at any time with questions or concerns. Your first scheduled call will be on 08/08/2023 between 1:00-3:00 pm. The tentative evaluation is scheduled on 09/28/2023. If you need to reschedule, please call [phone].    Please complete the following.    Fill out and return the enclosed forms  Authorization for Electronic Communication  Authorization to Discuss Protected Health Information  Authorization for Release of Protected Health Information    Sign up for:  Vikki, access to your electronic medical record (see enclosed pamphlet)  Pre-Kidney Transplant (I) (English): [email or web address] [facility]playlist?[email or web address]    Pre-Kidney Transplant (II) (English): [email or web address]://www.[email or web address] [facility]Omnisiolist?[email or web address]                          Transplant Medication Training (English): https://www.[employer]?[email or web address]    Transplant Admission Training (English): https://www.[email or web address] [facility]?[email or web address]    Post-Transplant Complications (All Organs) (English): https://www.[employer]?[email or web address]    Post-Transplant General Health (All Organs) (English): https://www.[employer] [facility]Omn[employer]iolist?[email or web address]    General Patient Learning Modules (Enoxaparin, Warfarin, Nixon) (English): [email or web address].[email or web address].com/playlist?[email or web address]                    You can use these tools to learn more about your transplant, communicate with your care team, and track  your medical details      Sincerely,      Solid Organ Transplant  [facility]    cc: Referring Physician PCP

## 2023-07-13 NOTE — PROGRESS NOTES
Care Suites Admission Nursing Note    Patient Information  Name: Blanco Osborne  Age: 58 year old  Reason for admission: Fistulogram  Care Suites arrival time: 900    Visitor Information  Name: [facility]- [phone]     Patient Admission/Assessment   Pre-procedure assessment complete: Yes  If abnormal assessment/labs, provider notified: N/A  NPO: Yes  Medications held per instructions/orders: N/A  Consent: will sign with MD  If applicable, pregnancy test status: N/A  Patient oriented to room: Yes  Education/questions answered: Yes  Plan/other: Proceed as planned    Discharge Planning  Discharge name/phone number: [facility] [phone]  Overnight post sedation caregiver: [facility] [phone]  Discharge location: [place]    Miladys Gutiérrez RN

## 2023-07-13 NOTE — DISCHARGE INSTRUCTIONS
Fistulagram Discharge Instructions     After you go home:    You may resume your normal diet  Have an adult stay with you for 6 hours if you received sedation       For 24 hours - due to the sedation you received:  Relax and take it easy  Do NOT make any important or legal decisions  Do NOT drive or operate machines at home or at work  Do NOT drink alcohol    Care of Puncture Site:    For the first 48 hrs, check your puncture site every couple hours while you are awake   You may remove/change the bandage tomorrow  You may shower tomorrow  No tub baths, whirlpools or swimming until your puncture site has fully healed  If puncture site starts to bleed - apply light pressure to site for 5 minutes or until bleeding stops     Activity     You should try to elevate your arm for the remainder of today to prevent increased swelling  You may go back to your normal activity in 24 hours   Wait 48 hours before lifting, straining, exercise or other strenuous activity    Medicines:    You may resume all your medications  For minor pain, you may take Acetaminophen (Tylenol) or Ibuprofen (Advil)                 Call the provider who ordered this procedure if:    Increased pain or a large or growing hard lump around the site  Blood or fluid is draining from the site  The site is red, swollen, hot or tender  Chills or a fever greater than 101 F (38 C)  Pain that is getting worse  Any questions or concerns      Call  911 or go to the Emergency Room if:  Severe pain or trouble breathing  Bleeding that you cannot control      If you have questions call:          [facility] Radiology Dept @ [phone]      The provider who performed your procedure was Dr. Plata.

## 2023-07-13 NOTE — PROGRESS NOTES
Care Suites Post Procedure Note    Patient Information  Name: Blanco Osborne  Age: 58 year old    Post Procedure  Time patient returned to Care Suites: 1215  Concerns/abnormal assessment: pt felling light headed VSS  If abnormal assessment, provider notified: N/A  Plan/Other: post care as ordered, juice and snack box given,  Brother as bedside    Tobi Lopez RN

## 2023-07-13 NOTE — PROGRESS NOTES
Care Suites Discharge Nursing Note    Patient Information  Name: Blanco Osborne  Age: 58 year old    Discharge Education:  Discharge instructions reviewed: Yes  Additional education/resources provided: NA  Patient/patient representative verbalizes understanding: Yes  Patient discharging on new medications: No  Medication education completed: N/A    Discharge Plans:   Discharge location: home  Discharge ride contacted: Yes  Approximate discharge time: 1425    Discharge Criteria:  Discharge criteria met and vital signs stable: Yes    Patient Belongs:  Patient belongings returned to patient: Yes    Miladys Gutiérrez RN

## 2023-07-13 NOTE — LETTER
Blanco Osborne  [address]  [place] [address]                July 25, 2023                                                                                        MEDICAL RECORDS REQUEST    [facility] Records Request                      Facility: [place], [place]    Thank you for referring your patient to the [facility], in order to process the referral we will need the following information;    Demographics  5224 form   Immunizations records  Providers Progress notes, (last 3 note)      Please call our office at [phone] if you have any questions or concerns.                Please fax all paper records to [phone] within 1-3 business days.      Thank you,   The [facility] Referral Intake Team     [facility]  Solid Organ Transplant Office  [address]  [place] [address]

## 2023-07-13 NOTE — PRE-PROCEDURE
"GENERAL PRE-PROCEDURE:   Procedure:  Left upper extremity fistulagram  Date/Time:  7/13/2023 10:05 AM    Written consent obtained?: Yes    Risks and benefits: Risks, benefits and alternatives were discussed    Consent given by:  Patient  Patient states understanding of procedure being performed: Yes    Patient's understanding of procedure matches consent: Yes    Procedure consent matches procedure scheduled: Yes    Expected level of sedation:  Moderate  Appropriately NPO:  Yes  ASA Class:  2  Mallampati  :  Grade 2- soft palate, base of uvula, tonsillar pillars, and portion of posterior pharyngeal wall visible  Lungs:  Lungs clear with good breath sounds bilaterally  Heart:  Normal heart sounds and rate  History & Physical reviewed:  History and physical reviewed and no updates needed  Statement of review:  I have reviewed the lab findings, diagnostic data, medications, and the plan for sedation    Difficult cannulation, prolonged bleed times  HD via tunneled CVC  \"left upper arm proximal radial to cephalic fistula creation on 3/21/2023 for ESRD\" by Dr Tarun Horn PA-C  Interventional Radiology  [phone] (IR)  *[phone] ([facility] Office)      "

## 2023-07-13 NOTE — CONSULTS
[place] [facility]    I was asked to evaluate Blanco Osborne after his left upper arm fistulogram today by Dr. Bernabe of interventional radiology.    Blanco has end-stage renal disease on hemodialysis.  We created a left upper arm proximal radial to cephalic fistula on 3/21/2023.  He had a very adequate mid and distal upper arm vein and smaller by duplex at the shoulder region.  Patient did not follow-up with me after his discharge from the hospital.    He has been dialyzing at the [facility] unit using the fistula.  Apparently KT V's have been poor and they ordered a fistulogram which was performed today.    Fistulogram revealed a widely patent brachial/radial artery anastomosis.  Several centimeters beyond the anastomosis there is a pseudoaneurysm and stenosis that was gently angioplastied.  Patient had several sidebranches in the mid upper arm that was very narrow at the origins but had large flow with the proximal upper one third of the cephalic vein being chronically occluded.    Prefistulogram vein mapping revealed a very adequate left upper arm basilic/brachial vein almost 6 mm in diameter throughout its length.    Patient was seen in the care suites.  He here with his friend.  Pseudoaneurysm is noted in the distal forearm with overlying skin being normal.  He has a thin arm.  Strong pulses noted in the fistula with good clinical diameter to the proximal one third of the upper arm.  No significant arm swelling.      IMPRESSION: Poorly functioning fistula due to outflow cephalic vein occlusion.  Outflow is via the collateral veins which are keeping this open but would explain the poor dialysis.    I have recommended to consider revision.  We will perform a vein mapping today to reevaluate the left upper arm brachial/basilic vein and also try to make sure there is no central venous occlusion (patient has a liver transplant and likely had multiple lines at 1 time).       Depending on  these findings we could revise the fistula to the upper arm brachial/basilic vein.  This would require repair of the fistula pseudoaneurysm but may allow use of the mid portion of the cephalic vein outflow tract.  Otherwise the near complete revision to be for the pseudoaneurysm with the transposed brachial vein would be an option.    I discussed this with the patient and his friend.  They would like to wait due to personal reasons to early August.  Fistula is still usable but will be inefficient until revision is performed.  The total revisions necessary will need a tunneled catheter for approximately 4 weeks.    He is on chronic Eliquis that they are taking discontinuing.  I would recommend stopping this 3 days prior to the procedure to decrease bleeding and this was discussed.    Over 35 minutes with patient and family today.      Deejay Mcneil MD     ADDENDUM: Venous mapping performed this afternoon.  2 pseudoaneurysms appreciated with the segment of fistula this level following angioplasty measuring 5.3 mm.  To the point of occlusion fistula is of excellent diameter up to 8 mm.  Very well-developed left upper arm brachial vein at least 5 mm in diameter.    With these findings we will plan for revision of the fistula.  We will decide at time of surgery whether we transposed the entire left upper arm basilic vein anastomosis to the fistula just before the initial pseudoaneurysm or whether we repair the pseudoaneurysms with a vein patch and transposed the more proximal basilic vein up to the well-developed cephalic vein.    I spoke with he and his wife about this this evening after they returned home.  He does still have his right jugular tunneled catheter but they have not been using this.  With the issues with the fistula and the fact that he does not want this repaired until early August they should use the tunneled catheter for dialysis and hold on using the fistula till postrepair and  maturation    Deejay Mcneil MD

## 2023-07-13 NOTE — IR NOTE
Interventional Radiology Intra-procedural Nursing Note    Patient Name: Blanco Osborne  Medical Record Number: [id number]  Today's Date: July 13, 2023    Procedure: Left upper extremity fistulagram  Start time: 1100  End time: 1153  Report provided to: ELIECER RN, Caitlin      Note: Patient entered Interventional Radiology Suite number 1 via cart. Patient awake, alert and oriented. Assisted onto procedural table in Supine position. Prepped and draped.  Dr. Plata in room. Time out and procedure started. Vital signs stable. Telemetry reading NSR.    Procedure well tolerated by patient without complications. Procedure end with debrief by Dr. Plata.  Pressure held until hemostasis achieved. Gauze dressing, quickclot and tagederm  applied to Lt Fistula Site.    Administered medication totals:  Lidocaine 1% 3 mL Intradermal      Versed 2 mg IVP  Fentanyl 150 mcg IVP    Last dose of sedation administered at 1132

## 2023-07-18 ENCOUNTER — TELEPHONE (OUTPATIENT)
Dept: FAMILY MEDICINE | Facility: CLINIC | Age: 58
End: 2023-07-18
Payer: COMMERCIAL

## 2023-07-18 NOTE — TELEPHONE ENCOUNTER
Page 5 of 6 and 6 of 6 attending physician statement. Patient asking if this form was signed and sent to [facility]. The document had a fax number on it. Patient states it has not arrived to the people who need it. Do we know the status?     PETER Latham  [facility]

## 2023-07-20 ENCOUNTER — TELEPHONE (OUTPATIENT)
Dept: TRANSPLANT | Facility: CLINIC | Age: 58
End: 2023-07-20
Payer: COMMERCIAL

## 2023-07-20 DIAGNOSIS — G25.81 RESTLESS LEG SYNDROME: ICD-10-CM

## 2023-07-20 DIAGNOSIS — N18.6 ESRD (END STAGE RENAL DISEASE) ON DIALYSIS (H): ICD-10-CM

## 2023-07-20 DIAGNOSIS — G47.00 INSOMNIA, UNSPECIFIED TYPE: Primary | ICD-10-CM

## 2023-07-20 DIAGNOSIS — Z99.2 ESRD (END STAGE RENAL DISEASE) ON DIALYSIS (H): ICD-10-CM

## 2023-07-20 NOTE — TELEPHONE ENCOUNTER
"Call back to Blanco.  He is home, he is driving!! - Ofe and Blanco's brother have been a great help to him. Blanco is going to the airshow in [facility] this weekend.  He plans to camp w/ his brother and nephew  - has lots of friends.  Walking is still difficult at times.  Legs have given out twice.  Fell but didn't get hurt  He is doing dialysis 3 x / week.  He reports that he is taking tacrolimus 2 mg daily.  Weight is up to 190#.  He is not drinking.  He says he \"wasn't drinking much prior to this latest incident\".   Blanco is extremely thankful and grateful.  He is determined to live.  Would like to be put on a waitlist to see Dr. Simms sooner if possible.     "
Patient Call: General  Route to LPN    Reason for call: returning missed call. Patient provide no details to writer. Call back number is [phone].    Call back needed? Yes    Return Call Needed  Same as documented in contacts section  When to return call?: Same day: Route High Priority  
Improved

## 2023-07-21 ENCOUNTER — TELEPHONE (OUTPATIENT)
Dept: OTHER | Facility: CLINIC | Age: 58
End: 2023-07-21
Payer: COMMERCIAL

## 2023-07-21 ENCOUNTER — TELEPHONE (OUTPATIENT)
Dept: TRANSPLANT | Facility: CLINIC | Age: 58
End: 2023-07-21
Payer: COMMERCIAL

## 2023-07-21 ENCOUNTER — TRANSFERRED RECORDS (OUTPATIENT)
Dept: HEALTH INFORMATION MANAGEMENT | Facility: CLINIC | Age: 58
End: 2023-07-21
Payer: COMMERCIAL

## 2023-07-21 DIAGNOSIS — T82.590A MALFUNCTION OF ARTERIOVENOUS DIALYSIS FISTULA (H): Primary | ICD-10-CM

## 2023-07-21 RX ORDER — B COMPLEX, C NO.20/FOLIC ACID 1 MG
1 CAPSULE ORAL DAILY
Qty: 30 CAPSULE | Refills: 1 | Status: SHIPPED | OUTPATIENT
Start: 2023-07-21 | End: 2023-11-07

## 2023-07-21 RX ORDER — TRAZODONE HYDROCHLORIDE 50 MG/1
25-50 TABLET, FILM COATED ORAL
Qty: 30 TABLET | Refills: 2 | Status: SHIPPED | OUTPATIENT
Start: 2023-07-21 | End: 2023-08-10

## 2023-07-21 NOTE — TELEPHONE ENCOUNTER
Patient recently consulted on per Dr. Mcneil while inpatient on 7/13/23.    Written surgery order obtained for the following:  Repair of left arm fistula pseudoaneurysm; outflow revision of basilic vein    Patient Education completed with patient via phone    Procedure: Repair of left arm fistula pseudoaneurysm; outflow revision of basilic vein  Diagnosis: AVF malfunction  Anticoagulation Instruction: hold Eliquis 3 days prior  Pre-Operative Physical Exam: You need to have a pre-op physical exam within 30 days of your procedure. Your procedure may be cancelled if you do not have a current History and Physical. Call your PCP's office to schedule.  Allergies:  Updated in Epic  Bowel Prep: n/a  NPO for solid 8 hours prior to arrival time.   NPO for clear liquids 2 hours prior to arrival time.   Post Procedure Education: [facility] post-procedure fact sheet reviewed with patient.    Showering instructions reviewed: Yes    Learner(s):patient  Method: Listening  Barriers to Learning:No Barrier  Outcome: Patient did verbalize understanding of above education.    Patient stated he would prefer to be scheduled the first week of August.  He will be out of town starting July 24th and will not be reachable.  He would prefer his wife Ofe, to be called to schedule his fistula surgery.  Will inform surgery scheduler regarding this.    Raquel Rod, ANANDN, RN-[facility]

## 2023-07-21 NOTE — TELEPHONE ENCOUNTER
Patient Call: General  Route to LPN    Reason for call: Daw[place]  Deirdre Anderson [facility] called in regards of update on patient's referral paper work fax over about 10 days ago on Jun 11th. Call back number [phone].     Call back needed? Yes    Return Call Needed  Same as documented in contacts section  When to return call?: Same day: Route High Priority

## 2023-07-25 ENCOUNTER — TELEPHONE (OUTPATIENT)
Dept: OTHER | Facility: CLINIC | Age: 58
End: 2023-07-25
Payer: COMMERCIAL

## 2023-07-25 VITALS — HEIGHT: 72 IN | WEIGHT: 190 LBS | BODY MASS INDEX: 25.73 KG/M2

## 2023-07-25 DIAGNOSIS — Z53.9 DIAGNOSIS NOT YET DEFINED: Primary | ICD-10-CM

## 2023-07-25 PROCEDURE — 99207 PR MD RECERTIFICATION HHA PT: CPT | Performed by: PHYSICIAN ASSISTANT

## 2023-07-25 PROCEDURE — G0179 MD RECERTIFICATION HHA PT: HCPCS | Performed by: PHYSICIAN ASSISTANT

## 2023-07-25 NOTE — TELEPHONE ENCOUNTER
Co-worker left a message for patient's wife Ofe to call with dates to avoid for scheduling surgery between now and the beginning of September.

## 2023-07-25 NOTE — TELEPHONE ENCOUNTER
CASE REQUEST RECEIVED ON 7/21/23 FOR: REPAIR OF LEFT ARM FISTULA PSEUDOANEURYSM, OUTFLOW REVISION OF BASILIC VEIN    CASE ID: [id number]

## 2023-07-25 NOTE — TELEPHONE ENCOUNTER
PCP: Trevor Carter PA-C  Referring Provider: Zenon Bradshaw MD   Referring Diagnosis: ESRD     GFR/Date: 12 (07/13/2023) 11 (07/11/2023)     Is patient under the age of 65? Yes  Is patient diabetic? No  Is patient on insulin? No  Was patient offered a pancreas transplant referral? No    Is patient in a group home/assisted living? No  Does patient have a guardian? No    Referral intake process completed.  Patient is aware that after financial approval is received, medical records will be requested.   Patient confirmed for a callback from transplant coordinator on 08/08/2023 (within 2 weeks)  Tentative evaluation date on 09/28/2023 slot #1 (within 4 weeks) if appointment is virtual, does patient have capabilities of setting this up? Did not ask     Confirmed coordinator will discuss evaluation process in more detail at the time of their call.   Patient is aware of the need to arrange age appropriate cancer screening, vaccinations, and dental care.    Reminded patient to complete questionnaire, complete medical records release, and review packet prior to evaluation visit .  Assessed patient for special needs (ie-wheelchair, assistance, guardian, and ):  None   Patient instructed to call [phone] with questions.     Patient gave verbal consent during intake call to obtain medical records and documents outside of [facility]: Yes

## 2023-07-31 NOTE — TELEPHONE ENCOUNTER
Spoke with Blanco and he has been out of town for a week.  He would like the next available Tuesday as he does dialysis MWF.

## 2023-08-01 ENCOUNTER — TELEPHONE (OUTPATIENT)
Dept: TRANSPLANT | Facility: CLINIC | Age: 58
End: 2023-08-01
Payer: COMMERCIAL

## 2023-08-01 NOTE — TELEPHONE ENCOUNTER
Spoke with patient's wife Ofe and informed her of Blanco's scheduled surgery on Tuesday, 8/15/23 @ 12:30pm with a check-in time of 10:30am at [facility]k.    Patient will schedule a pre-op exam appointment.      Patient is scheduled for a post-op appointment on Tuesday, 8/29/23 with Saima Coker NP.

## 2023-08-04 DIAGNOSIS — G47.00 INSOMNIA, UNSPECIFIED TYPE: ICD-10-CM

## 2023-08-04 DIAGNOSIS — R56.9 SEIZURES (H): ICD-10-CM

## 2023-08-04 DIAGNOSIS — G25.81 RESTLESS LEG SYNDROME: ICD-10-CM

## 2023-08-04 DIAGNOSIS — K74.60 CIRRHOSIS OF LIVER WITH ASCITES, UNSPECIFIED HEPATIC CIRRHOSIS TYPE (H): Primary | ICD-10-CM

## 2023-08-04 DIAGNOSIS — I48.0 PAROXYSMAL ATRIAL FIBRILLATION (H): ICD-10-CM

## 2023-08-04 DIAGNOSIS — R18.8 CIRRHOSIS OF LIVER WITH ASCITES, UNSPECIFIED HEPATIC CIRRHOSIS TYPE (H): Primary | ICD-10-CM

## 2023-08-04 DIAGNOSIS — Z94.4 LIVER TRANSPLANTED (H): ICD-10-CM

## 2023-08-04 DIAGNOSIS — R18.8 OTHER ASCITES: ICD-10-CM

## 2023-08-04 RX ORDER — HYDROXYZINE HYDROCHLORIDE 25 MG/1
25 TABLET, FILM COATED ORAL 3 TIMES DAILY PRN
Qty: 90 TABLET | Refills: 0 | Status: SHIPPED | OUTPATIENT
Start: 2023-08-04 | End: 2024-04-11

## 2023-08-04 RX ORDER — ROPINIROLE 0.5 MG/1
0.5 TABLET, FILM COATED ORAL 2 TIMES DAILY
Qty: 60 TABLET | Refills: 2 | Status: SHIPPED | OUTPATIENT
Start: 2023-08-04 | End: 2023-08-10

## 2023-08-04 RX ORDER — TACROLIMUS 1 MG/1
1 CAPSULE ORAL EVERY 12 HOURS
Qty: 60 CAPSULE | Refills: 1 | Status: ON HOLD | OUTPATIENT
Start: 2023-08-04 | End: 2023-09-28

## 2023-08-04 RX ORDER — LACOSAMIDE 100 MG/1
100 TABLET ORAL EVERY 12 HOURS
Qty: 60 TABLET | Refills: 1 | Status: SHIPPED | OUTPATIENT
Start: 2023-08-04 | End: 2023-08-10

## 2023-08-04 RX ORDER — TRAZODONE HYDROCHLORIDE 50 MG/1
25-50 TABLET, FILM COATED ORAL
Qty: 30 TABLET | Refills: 2 | OUTPATIENT
Start: 2023-08-04

## 2023-08-04 RX ORDER — LANOLIN ALCOHOL/MO/W.PET/CERES
3 CREAM (GRAM) TOPICAL AT BEDTIME
Qty: 90 TABLET | Refills: 1 | Status: SHIPPED | OUTPATIENT
Start: 2023-08-04

## 2023-08-10 ENCOUNTER — MYC MEDICAL ADVICE (OUTPATIENT)
Dept: GASTROENTEROLOGY | Facility: CLINIC | Age: 58
End: 2023-08-10

## 2023-08-10 ENCOUNTER — PATIENT OUTREACH (OUTPATIENT)
Dept: GASTROENTEROLOGY | Facility: CLINIC | Age: 58
End: 2023-08-10

## 2023-08-10 ENCOUNTER — OFFICE VISIT (OUTPATIENT)
Dept: FAMILY MEDICINE | Facility: CLINIC | Age: 58
End: 2023-08-10
Payer: COMMERCIAL

## 2023-08-10 VITALS
HEART RATE: 72 BPM | RESPIRATION RATE: 12 BRPM | WEIGHT: 194.5 LBS | SYSTOLIC BLOOD PRESSURE: 98 MMHG | BODY MASS INDEX: 26.34 KG/M2 | OXYGEN SATURATION: 97 % | DIASTOLIC BLOOD PRESSURE: 63 MMHG | TEMPERATURE: 98 F | HEIGHT: 72 IN

## 2023-08-10 DIAGNOSIS — G25.81 RESTLESS LEG SYNDROME: ICD-10-CM

## 2023-08-10 DIAGNOSIS — Z99.2 ESRD (END STAGE RENAL DISEASE) ON DIALYSIS (H): ICD-10-CM

## 2023-08-10 DIAGNOSIS — Z12.11 SPECIAL SCREENING FOR MALIGNANT NEOPLASMS, COLON: Primary | ICD-10-CM

## 2023-08-10 DIAGNOSIS — R56.9 SEIZURES (H): ICD-10-CM

## 2023-08-10 DIAGNOSIS — D84.9 ACQUIRED IMMUNOCOMPROMISED STATE (H): ICD-10-CM

## 2023-08-10 DIAGNOSIS — Z01.818 PRE-OP EXAM: Primary | ICD-10-CM

## 2023-08-10 DIAGNOSIS — Z94.4 LIVER TRANSPLANTED (H): ICD-10-CM

## 2023-08-10 DIAGNOSIS — G47.00 INSOMNIA, UNSPECIFIED TYPE: ICD-10-CM

## 2023-08-10 DIAGNOSIS — Z79.899 OTHER LONG TERM (CURRENT) DRUG THERAPY: ICD-10-CM

## 2023-08-10 DIAGNOSIS — N18.6 ESRD (END STAGE RENAL DISEASE) ON DIALYSIS (H): ICD-10-CM

## 2023-08-10 LAB
ALBUMIN SERPL BCG-MCNC: 4.2 G/DL (ref 3.5–5.2)
ALP SERPL-CCNC: 196 U/L (ref 40–129)
ALT SERPL W P-5'-P-CCNC: 7 U/L (ref 0–70)
ANION GAP SERPL CALCULATED.3IONS-SCNC: 14 MMOL/L (ref 7–15)
AST SERPL W P-5'-P-CCNC: 25 U/L (ref 0–45)
BASOPHILS # BLD AUTO: 0 10E3/UL (ref 0–0.2)
BASOPHILS NFR BLD AUTO: 1 %
BILIRUB SERPL-MCNC: 0.5 MG/DL
BUN SERPL-MCNC: 45.6 MG/DL (ref 8–23)
CALCIUM SERPL-MCNC: 9.5 MG/DL (ref 8.6–10)
CHLORIDE SERPL-SCNC: 95 MMOL/L (ref 98–107)
CREAT SERPL-MCNC: 5.76 MG/DL (ref 0.67–1.17)
DEPRECATED HCO3 PLAS-SCNC: 32 MMOL/L (ref 22–29)
EOSINOPHIL # BLD AUTO: 0.1 10E3/UL (ref 0–0.7)
EOSINOPHIL NFR BLD AUTO: 5 %
ERYTHROCYTE [DISTWIDTH] IN BLOOD BY AUTOMATED COUNT: 14.4 % (ref 10–15)
GFR SERPL CREATININE-BSD FRML MDRD: 11 ML/MIN/1.73M2
GLUCOSE SERPL-MCNC: 104 MG/DL (ref 70–99)
HBA1C MFR BLD: 4.7 % (ref 0–5.6)
HCT VFR BLD AUTO: 28.5 % (ref 40–53)
HGB BLD-MCNC: 9.1 G/DL (ref 13.3–17.7)
IMM GRANULOCYTES # BLD: 0 10E3/UL
IMM GRANULOCYTES NFR BLD: 0 %
LYMPHOCYTES # BLD AUTO: 1.3 10E3/UL (ref 0.8–5.3)
LYMPHOCYTES NFR BLD AUTO: 44 %
MCH RBC QN AUTO: 31.8 PG (ref 26.5–33)
MCHC RBC AUTO-ENTMCNC: 31.9 G/DL (ref 31.5–36.5)
MCV RBC AUTO: 100 FL (ref 78–100)
MONOCYTES # BLD AUTO: 0.3 10E3/UL (ref 0–1.3)
MONOCYTES NFR BLD AUTO: 12 %
NEUTROPHILS # BLD AUTO: 1.1 10E3/UL (ref 1.6–8.3)
NEUTROPHILS NFR BLD AUTO: 39 %
PLATELET # BLD AUTO: 82 10E3/UL (ref 150–450)
POTASSIUM SERPL-SCNC: 4.3 MMOL/L (ref 3.4–5.3)
PROT SERPL-MCNC: 7.6 G/DL (ref 6.4–8.3)
RBC # BLD AUTO: 2.86 10E6/UL (ref 4.4–5.9)
SODIUM SERPL-SCNC: 141 MMOL/L (ref 136–145)
WBC # BLD AUTO: 2.9 10E3/UL (ref 4–11)

## 2023-08-10 PROCEDURE — 99207 PR FOOT EXAM NO CHARGE: CPT | Performed by: PHYSICIAN ASSISTANT

## 2023-08-10 PROCEDURE — 36415 COLL VENOUS BLD VENIPUNCTURE: CPT | Performed by: PHYSICIAN ASSISTANT

## 2023-08-10 PROCEDURE — 99214 OFFICE O/P EST MOD 30 MIN: CPT | Mod: 25 | Performed by: PHYSICIAN ASSISTANT

## 2023-08-10 PROCEDURE — 85025 COMPLETE CBC W/AUTO DIFF WBC: CPT | Performed by: PHYSICIAN ASSISTANT

## 2023-08-10 PROCEDURE — 93000 ELECTROCARDIOGRAM COMPLETE: CPT | Performed by: PHYSICIAN ASSISTANT

## 2023-08-10 PROCEDURE — 83036 HEMOGLOBIN GLYCOSYLATED A1C: CPT | Performed by: PHYSICIAN ASSISTANT

## 2023-08-10 PROCEDURE — 80053 COMPREHEN METABOLIC PANEL: CPT | Performed by: PHYSICIAN ASSISTANT

## 2023-08-10 RX ORDER — TRAZODONE HYDROCHLORIDE 50 MG/1
25-50 TABLET, FILM COATED ORAL
Qty: 90 TABLET | Refills: 1 | Status: ON HOLD | OUTPATIENT
Start: 2023-08-10 | End: 2023-09-27

## 2023-08-10 RX ORDER — LACOSAMIDE 100 MG/1
100 TABLET ORAL EVERY 12 HOURS
Qty: 60 TABLET | Refills: 2 | Status: SHIPPED | OUTPATIENT
Start: 2023-08-10 | End: 2023-09-05

## 2023-08-10 RX ORDER — ROPINIROLE 0.5 MG/1
0.5 TABLET, FILM COATED ORAL 2 TIMES DAILY
Qty: 60 TABLET | Refills: 2 | Status: SHIPPED | OUTPATIENT
Start: 2023-08-10 | End: 2023-09-20

## 2023-08-10 ASSESSMENT — PAIN SCALES - GENERAL: PAINLEVEL: MODERATE PAIN (4)

## 2023-08-10 NOTE — PROGRESS NOTES
HX adenomatous polyps with 3 year recall on last colonoscopy performed in 2019  Per Ki Carter via staff message [facility] for another colonoscopy    Ordering/Referring Provider: Ki Carter   BMI: Estimated body mass index is 26.38 kg/m  as calculated from the following:    Height as of an earlier encounter on 8/10/23: 1.829 m (6').    Weight as of an earlier encounter on 8/10/23: 88.2 kg (194 lb 8 oz).     Sedation:  Based on patient's medical history patient is appropriate for   MAC/deep sedation.   BMI<= 45 45 < BMI <= 48 48 < BMI < = 50  BMI > 50   No Restrictions No [facility]  No [facility]  [facility] with exceptions Hospital Only OR Only       Location:  Does patient have an LVAD?  No    Does patient have a history of moderate to severe sleep apnea?  Yes (Hospital Only)    Does patient have a history of asthma, COPD or any other lung disease?  Yes     Patient has a documented history of COPD.     Review of chart, indicate respiratory disease is documented as well controlled. (no scheduling restrictions).    Does patient use home oxygen?  No    Does patient have a history of cardiac disease?  Yes     In the past 6 months, has the patient been hospitalized for any cardiac issues including cardiomyopathy, heart attack, or stent placement?  No    Does the patient have any implantable devices (pacemaker, ICD)?  No    Is patient awaiting a heart or lung transplant?   No    Has patient had a stroke or transient ischemic attack in the last 6 months?   No    Is the patient currently on dialysis?   Yes (Hospital Only)    Prep:  Previous prep (last colonoscopy):   Standard [facility]    Quality of previous prep:   Good    Is patient currently on dialysis, is ESRD, or CKD stage 4/5?   Yes ([facility])    Does patient have a diagnosis of diabetes?  No    Does patient have a diagnosis of cystic fibrosis (CF)?  No    BMI > 40?  No    Final Referral Status:  meets the criteria for placement of colonoscopy screening   referral order.      Referral order placed with the following recommendations:  Sedation: MAC/Deep Sedation  Location Type: Hospital  Prep: Standard [facility]

## 2023-08-10 NOTE — PROGRESS NOTES
[address], [address]  [facility] [address]  Phone: [phone]  Primary Provider: Rio Carter  Pre-op Performing Provider: RIO CARTER      PREOPERATIVE EVALUATION:  Today's date: 8/10/2023    Blanco Osborne is a 58 year old male who presents for a preoperative evaluation.    Surgical Information:  Surgery/Procedure: REPAIR OF LEFT ARM FISTULA PSEUDOANEURYSM; OUTFLOW REVISION OF BASILIC VEIN   Surgery Location:  OR   Surgeon: Deejay Mcneil MD   Surgery Date: 8/15/2023  Time of Surgery: 12:30 pm  Where patient plans to recover: At home with family  Fax number for surgical facility: Note does not need to be faxed, will be available electronically in Epic.    Assessment & Plan     The proposed surgical procedure is considered INTERMEDIATE risk.    Pre-op exam  ESRD (end stage renal disease) on dialysis (H)  Liver transplanted (H)  Acquired immunocompromised state (H)    Cleared for surgery pending labs.  Check CBC, CMP, A1c,.  EKG stable compared to prior tracings.  Hold Eliquis 3 days prior to surgery.  No NSAIDs which he does not take any way as well as vitamins anticipation of surgery.  Continue all other medications as prescribed.  Comfortable with plan.    Restless leg syndrome  Continue Requip.  Tolerating well.  - rOPINIRole (REQUIP) 0.5 MG tablet  Dispense: 60 tablet; Refill: 2    Seizures (H)  Continues to remain seizure-free.  Has follow-up with neurology.  - Lacosamide (VIMPAT) 100 MG TABS tablet  Dispense: 60 tablet; Refill: 2    Insomnia, unspecified type  Refilled medication.  Sleep is stable.  - traZODone (DESYREL) 50 MG tablet  Dispense: 90 tablet; Refill: 1      Risks and Recommendations:  The patient has the following additional risks and recommendations for perioperative complications:   - No identified additional risk factors other than previously addressed    Suspect abdominal findings potentially a hernia/lipoma.  Recommend  ultrasound in the future.  Close monitoring for worsening symptoms.      RECOMMENDATION:  APPROVAL GIVEN to proceed with proposed procedure pending review of diagnostic evaluation.    30 minutes spent by me on the date of the encounter doing chart review, review of test results, interpretation of tests, patient visit, and documentation         Subjective       HPI related to upcoming procedure:    58-year-old male with a past medical history for end-stage renal disease on dialysis and is status post liver transplant, here for preoperative clearance for upcoming repair of left arm fistula.  Overall he feels well.  Was recommended by vascular to hold Eliquis 3 days prior to surgery which is reasonable.  No problems in the past with anesthesia.  Denies shortness of breath, chest pain, palpitations, abdominal pain, nausea or vomiting.  No issues with activity, working with outpatient PT.          8/10/2023     9:36 AM   Preop Questions   1. Have you ever had a heart attack or stroke? Yes   2. Have you ever had surgery on your heart or blood vessels, such as a stent placement, a coronary artery bypass, or surgery on an artery in your head, neck, heart, or legs? No   3. Do you have chest pain with activity? No   4. Do you have a history of  heart failure? No   5. Do you currently have a cold, bronchitis or symptoms of other infection? No   6. Do you have a cough, shortness of breath, or wheezing? No   7. Do you or anyone in your family have previous history of blood clots? No   8. Do you or does anyone in your family have a serious bleeding problem such as prolonged bleeding following surgeries or cuts? No   9. Have you ever had problems with anemia or been told to take iron pills? No   10. Have you had any abnormal blood loss such as black, tarry or bloody stools? YES   11. Have you ever had a blood transfusion? YES   11a. Have you ever had a transfusion reaction? No   12. Are you willing to have a blood transfusion if  it is medically needed before, during, or after your surgery? Yes   13. Have you or any of your relatives ever had problems with anesthesia? No   14. Do you have sleep apnea, excessive snoring or daytime drowsiness? No   15. Do you have any artifical heart valves or other implanted medical devices like a pacemaker, defibrillator, or continuous glucose monitor? No   16. Do you have artificial joints? No   17. Are you allergic to latex? No     Health Care Directive:  Patient does not have a Health Care Directive or Living Will:    Preoperative Review of :   reviewed - controlled substances reflected in medication list.        Review of Systems  CONSTITUTIONAL: NEGATIVE for fever, chills, change in weight  INTEGUMENTARY/SKIN: NEGATIVE for worrisome rashes, moles or lesions  EYES: NEGATIVE for vision changes or irritation  ENT/MOUTH: NEGATIVE for ear, mouth and throat problems  RESP: NEGATIVE for significant cough or SOB  CV: NEGATIVE for chest pain, palpitations or peripheral edema  GI: NEGATIVE for nausea, abdominal pain, heartburn, or change in bowel habits  : NEGATIVE for frequency, dysuria, or hematuria  MUSCULOSKELETAL: NEGATIVE for significant arthralgias or myalgia  NEURO: NEGATIVE for weakness, dizziness or paresthesias  ENDOCRINE: NEGATIVE for temperature intolerance, skin/hair changes  HEME: NEGATIVE for bleeding problems  PSYCHIATRIC: NEGATIVE for changes in mood or affect    Patient Active Problem List    Diagnosis Date Noted    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     Priority: High    Acquired immunocompromised state (H) 11/19/2022     Priority: High    Sepsis due to Streptococcus pneumoniae with acute hypoxic respiratory failure (H) 11/19/2022     Priority: High    Encounter for therapeutic drug monitoring 11/19/2022     Priority: High    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     Priority: High    Parapneumonic effusion 01/21/2023     Priority: Medium    Pleural effusion 01/21/2023      Priority: Medium    ESRD (end stage renal disease) on dialysis (H) 01/07/2023     Priority: Medium    Anemia of chronic disease due to ESRD, cirrhosis and hypersplenism 01/07/2023     Priority: Medium    Acute on chronic respiratory failure with hypoxia (H) 12/29/2022     Priority: Medium    History of sudden cardiac arrest, PEA 12/29/2022     Priority: Medium     2 min CPR      Hypotension, unspecified hypotension type 12/27/2022     Priority: Medium    Encephalopathy 12/23/2022     Priority: Medium    PEA (Pulseless electrical activity) (H) 12/23/2022     Priority: Medium    Seizures (H) 12/23/2022     Priority: Medium    Pneumothorax on left 12/16/2022     Priority: Medium    Critical illness myopathy 12/04/2022     Priority: Medium    Hyperammonemia (H) 11/29/2022     Priority: Medium    Pneumothorax 11/29/2022     Priority: Medium    Embolic stroke (H) 11/20/2022     Priority: Medium    Respiratory arrest (H) 11/12/2022     Priority: Medium    Lactic acidosis 11/12/2022     Priority: Medium    Respiratory acidosis 11/12/2022     Priority: Medium    Acute renal failure, unspecified acute renal failure type (H) 11/12/2022     Priority: Medium    Immunosuppression (H) 09/28/2016     Priority: Medium    Liver transplanted (H) 09/21/2016     Priority: Medium    Gastroesophageal reflux disease with esophagitis 03/15/2016     Priority: Medium     Formatting of this note might be different from the original.  [facility], 3/16.  Varices banded 2016.      Cirrhosis of liver (H) 02/11/2016     Priority: Medium    NAFLD (nonalcoholic fatty liver disease) 02/11/2016     Priority: Medium    SBP (spontaneous bacterial peritonitis) (H) 11/11/2015     Priority: Medium     Lactobacillus      Coagulopathy (H) 11/09/2015     Priority: Medium    Leukocytosis 11/09/2015     Priority: Medium    Ascites 05/13/2015     Priority: Medium    Hyperlipidemia 05/13/2015     Priority: Medium    Hypertension 05/13/2015     Priority: Medium     Colon adenoma 08/21/2013     Priority: Medium     Formatting of this note might be different from the original.  8/13 1 adenoma, recheck 2019:      CHRISTIAN on CPAP 03/05/2012     Priority: Medium     Formatting of this note might be different from the original.  PSG 2/12: 142 AHI. 11cm H20      Dyslipidemia 02/07/2012     Priority: Medium    Morbid obesity (H) 08/28/2011     Priority: Medium    Abnormal liver function test 07/28/2011     Priority: Medium    Umbilical hernia 07/27/2011     Priority: Medium      Past Medical History:   Diagnosis Date    Acquired immunocompromised state (H) 11/19/2022    Ascites     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    Cancer (H) 2023    Squamous Cell Cancer- Since resolved    Cirrhosis of liver with ascites (H) 02/11/2016    H/O alcohol abuse     History of blood transfusion     Hypertension     Patients states that HTN has been resolved    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    NAFLD (nonalcoholic fatty liver disease) 02/11/2016    CHIRSTIAN on CPAP     Parainfluenza type 1 infection 11/19/2022    SBP (spontaneous bacterial peritonitis) (H)     [facility]    Sepsis due to Streptococcus pneumoniae with acute hypoxic respiratory failure (H) 11/19/2022    Tubular adenoma 10/2019    Large cecal adenoma- due for surveillance colonoscopy in 3 years (10/2022)     Past Surgical History:   Procedure Laterality Date    APPENDECTOMY      BENCH LIVER N/A 09/20/2016    Procedure: BENCH LIVER;  Surgeon: Enoc Crews MD;  Location: [facility] OR    BRONCHOSCOPY FLEXIBLE AND RIGID N/A 12/20/2022    Procedure: BRONCHOSCOPY;  Surgeon: Alena Valenzuela MD;  Location:  GI    COLONOSCOPY  08/06/2013    repeat in 2018    COLONOSCOPY N/A 10/04/2019    Procedure: COLONOSCOPY, WITH POLYPECTOMY AND BIOPSY;  Surgeon: Go Chong MD;  Location: [facility] OR    CREATE FISTULA ARTERIOVENOUS UPPER EXTREMITY Left 03/21/2023    Procedure: LEFT UPPER EXTREMITY ARTERIOVENOUS FISTULA CREATION. LIGATION OF  COMPETING VASCULAR BRANCHES- LEFT;  Surgeon: Deejay Mcneil MD;  Location: [facility] OR    HERNIA REPAIR      IR CHEST TUBE PLACEMENT NON-TUNNELED RIGHT  2022    IR CVC TUNNEL PLACEMENT > 5 YRS OF AGE  2022    IR DIALYSIS FISTULOGRAM LEFT  2023    IR GASTROSTOMY TUBE CHANGE  2023    IR GASTROSTOMY TUBE PERCUTANEOUS PLCMNT  2022    IR PARACENTESIS  2022    IR PARACENTESIS  2022    IR THORACENTESIS  2022    IR THORACENTESIS  2020    IR THORACENTESIS  08/10/2020    IR TRANSCATHETER BIOPSY  2022    TRACHEOSTOMY N/A 2022    Procedure: TRACHEOSTOMY;  Surgeon: Nilesh Jackson MD;  Location: [facility] OR    TRANSPLANT LIVER RECIPIENT  DONOR N/A 2016    Procedure: TRANSPLANT LIVER RECIPIENT  DONOR;  Surgeon: Enoc Crews MD;  Location: [facility] OR     Current Outpatient Medications   Medication Sig Dispense Refill    apixaban ANTICOAGULANT (ELIQUIS) 5 MG tablet Take 1 tablet (5 mg) by mouth 2 times daily for 90 days 180 tablet 1    hydrOXYzine (ATARAX) 25 MG tablet Take 1 tablet (25 mg) by mouth 3 times daily as needed for itching 90 tablet 0    Lacosamide (VIMPAT) 100 MG TABS tablet Take 1 tablet (100 mg) by mouth every 12 hours 60 tablet 2    melatonin 3 MG tablet Take 1 tablet (3 mg) by mouth At Bedtime 90 tablet 1    midodrine (PROAMATINE) 10 MG tablet Take 1 tab 3xDay, during patient's dialysis days , Monday, Wednesday, Friday, patient takes 2 extra Midodrine one hour before dialysis, takes 1 more Midodrine when he gets there, and takes 1 more tablet during dialysis, this is an addition to patient's scheduled doses 150 tablet 1    multivitamin RENAL (TRIPHROCAPS) 1 capsule capsule Take 1 capsule by mouth daily 30 capsule 1    pantoprazole (PROTONIX) 40 MG EC tablet Take 1 tablet (40 mg) by mouth every morning (before breakfast) 30 tablet 1    rOPINIRole (REQUIP) 0.5 MG tablet Take 1 tablet (0.5 mg) by mouth 2 times daily 60  tablet 2    sevelamer carbonate (RENVELA) 800 MG tablet Take 1,600 mg by mouth 3 times daily (with meals)      tacrolimus (GENERIC EQUIVALENT) 1 MG capsule Take 1 capsule (1 mg) by mouth every 12 hours 60 capsule 1    traZODone (DESYREL) 50 MG tablet Take 0.5-1 tablets (25-50 mg) by mouth nightly as needed for sleep 90 tablet 1       No Known Allergies     Social History     Tobacco Use    Smoking status: Never    Smokeless tobacco: Never   Substance Use Topics    Alcohol use: Not Currently     Alcohol/week: 0.0 standard drinks of alcohol     Family History   Problem Relation Age of Onset    Coronary Artery Disease No family hx of     Cardiomyopathy No family hx of      History   Drug Use No         Objective     BP 98/63 (BP Location: Right arm, Patient Position: Sitting, Cuff Size: Adult Regular)   Pulse 72   Temp 98  F (36.7  C) (Temporal)   Resp 12   Ht 1.829 m (6')   Wt 88.2 kg (194 lb 8 oz)   SpO2 97%   BMI 26.38 kg/m      Physical Exam    GENERAL APPEARANCE: healthy, alert and no distress     EYES: EOMI,  PERRL     NECK: no adenopathy, no asymmetry, masses, or scars and thyroid normal to palpation     RESP: lungs clear to auscultation - no rales, rhonchi or wheezes     CV: regular rates and rhythm, normal S1 S2, no S3 or S4 and no murmur, click or rub     ABDOMEN: Large abdominal scar.  Ventral hernia present.  On the upper portion of his abdominal scar there is a soft freely movable nodule.  Approximately 1 cm.  Only reproducible with Valsalva.  Otherwise soft, nontender.     MS: extremities normal- no gross deformities noted, no evidence of inflammation in joints, FROM in all extremities.     SKIN: Port present. no suspicious lesions or rashes     NEURO: Normal strength and tone, sensory exam grossly normal, mentation intact and speech normal     PSYCH: mentation appears normal. and affect normal/bright     LYMPHATICS: No cervical adenopathy    Recent Labs   Lab Test 07/13/23  0926 07/11/23  1054  04/24/23  0815 04/23/23  0812 04/15/23  1852 04/14/23  0725 12/22/22  1108 12/21/22  1315 12/17/22  0519 12/16/22  2239 11/19/22  0447 11/19/22  0212   HGB  --  8.7*  --  8.4*  --  8.9*   < > 8.0*   < > 7.9*  7.9*   < >  --    PLT  --  101*  --   --   --  109*   < > 75*   < > 94*   < >  --    INR  --   --   --   --   --   --   --  1.36*  --  1.14   < >  --     143   < > 137   < > 132*   < >  --    < > 135   < >  --    POTASSIUM 4.1 3.7   < > 4.5   < > 5.0   < >  --    < > 4.0   < >  --    CR 5.13* 5.56*   < > 7.10*   < > 5.40*   < >  --    < > 3.35*   < >  --    A1C  --   --   --   --   --   --   --   --   --   --   --  4.7    < > = values in this interval not displayed.        Diagnostics:  Labs pending at this time.  Results will be reviewed when available.   EKG: appears normal, NSR, normal axis, normal intervals, no acute ST/T changes c/w ischemia, no LVH by voltage criteria, unchanged from previous tracings    Revised Cardiac Risk Index (RCRI):  The patient has the following serious cardiovascular risks for perioperative complications:   - Cerebrovascular Disease (TIA or CVA) = 1 point   - Serum Creatinine >2.0 mg/dl = 1 point     RCRI Interpretation: 2 points: Class III (moderate risk - 6.6% complication rate)     Estimated Functional Capacity: Performs 4 METS exercise without symptoms (e.g., light housework, stairs, 4 mph walk, 7 mph bike, slow step dance)      The likelihood of other entities in the differential is insufficient to justify any further testing for them at this time. This was explained to the patient. The patient was advised that persistent or worsening symptoms would require further evaluation. Patient advised to call the office and if unable to reach to go to the emergency room if they develop any new or worsening symptoms. Expressed understanding and agreement with above stated plan.     Signed Electronically by: Ki Carter PA-C  Copy of this evaluation report is provided to  requesting physician.

## 2023-08-10 NOTE — RESULT ENCOUNTER NOTE
Jeff Simeon,     It was nice seeing you today for your pre-op physical.  Labs and EKG are stable.  Red blood cell counts are the best they have been in over 4 months.  Heading in the right direction.    Once again, hold Eliquis 3 days prior to procedure.    Let me know if you have any questions or concerns,     Ki Carter PA-C  [facility]

## 2023-08-14 NOTE — TELEPHONE ENCOUNTER
[place] [facility]    I called Blanco Osborne and spoke with him and his wife this afternoon about his left upper arm fistula revision.  He is still dialyzing via the tunneled catheter.  He was at the [place] air show recently and wanted to wait for the revision till he returned home.      He has been on Eliquis due to episode of atrial fibrillation.  He was monitored multiple times after this with no recurrent atrial fibrillation.  Unclear if he and his wife while they continue on this medication but they do have a follow-up with cardiology in the near future.    They have been holding the Eliquis since this weekend in preparation for the surgery.  I did discuss that from a fistula standpoint Eliquis is not required.    We answered all questions this afternoon.       Deejay Mcneil MD

## 2023-08-15 ENCOUNTER — ANESTHESIA EVENT (OUTPATIENT)
Dept: SURGERY | Facility: CLINIC | Age: 58
DRG: 673 | End: 2023-08-15
Payer: COMMERCIAL

## 2023-08-15 ENCOUNTER — ANESTHESIA (OUTPATIENT)
Dept: SURGERY | Facility: CLINIC | Age: 58
DRG: 673 | End: 2023-08-15
Payer: COMMERCIAL

## 2023-08-15 ENCOUNTER — APPOINTMENT (OUTPATIENT)
Dept: SURGERY | Facility: PHYSICIAN GROUP | Age: 58
End: 2023-08-15
Payer: COMMERCIAL

## 2023-08-15 ENCOUNTER — HOSPITAL ENCOUNTER (INPATIENT)
Facility: CLINIC | Age: 58
LOS: 1 days | Discharge: HOME | DRG: 673 | End: 2023-08-16
Attending: SURGERY | Admitting: SURGERY
Payer: COMMERCIAL

## 2023-08-15 DIAGNOSIS — T82.590S DIALYSIS AV FISTULA MALFUNCTION, SEQUELA: Primary | ICD-10-CM

## 2023-08-15 PROCEDURE — 03QY0ZZ REPAIR UPPER ARTERY, OPEN APPROACH: ICD-10-PCS | Performed by: SURGERY

## 2023-08-15 PROCEDURE — 250N000012 HC RX MED GY IP 250 OP 636 PS 637: Performed by: STUDENT IN AN ORGANIZED HEALTH CARE EDUCATION/TRAINING PROGRAM

## 2023-08-15 PROCEDURE — 250N000011 HC RX IP 250 OP 636: Performed by: ANESTHESIOLOGY

## 2023-08-15 PROCEDURE — 710N000009 HC RECOVERY PHASE 1, LEVEL 1, PER MIN: Performed by: SURGERY

## 2023-08-15 PROCEDURE — 36832 AV FISTULA REVISION OPEN: CPT | Mod: LT | Performed by: SURGERY

## 2023-08-15 PROCEDURE — 250N000011 HC RX IP 250 OP 636: Performed by: SURGERY

## 2023-08-15 PROCEDURE — 250N000025 HC SEVOFLURANE, PER MIN: Performed by: SURGERY

## 2023-08-15 PROCEDURE — 03C80ZZ EXTIRPATION OF MATTER FROM LEFT BRACHIAL ARTERY, OPEN APPROACH: ICD-10-PCS | Performed by: SURGERY

## 2023-08-15 PROCEDURE — 370N000017 HC ANESTHESIA TECHNICAL FEE, PER MIN: Performed by: SURGERY

## 2023-08-15 PROCEDURE — 03WY0JZ REVISION OF SYNTHETIC SUBSTITUTE IN UPPER ARTERY, OPEN APPROACH: ICD-10-PCS | Performed by: SURGERY

## 2023-08-15 PROCEDURE — 258N000003 HC RX IP 258 OP 636: Performed by: SURGERY

## 2023-08-15 PROCEDURE — G0378 HOSPITAL OBSERVATION PER HR: HCPCS

## 2023-08-15 PROCEDURE — 250N000013 HC RX MED GY IP 250 OP 250 PS 637: Performed by: STUDENT IN AN ORGANIZED HEALTH CARE EDUCATION/TRAINING PROGRAM

## 2023-08-15 PROCEDURE — 250N000009 HC RX 250

## 2023-08-15 PROCEDURE — 250N000011 HC RX IP 250 OP 636

## 2023-08-15 PROCEDURE — 120N000001 HC R&B MED SURG/OB

## 2023-08-15 PROCEDURE — 03120ZD BYPASS INNOMINATE ARTERY TO UPPER ARM VEIN, OPEN APPROACH: ICD-10-PCS | Performed by: SURGERY

## 2023-08-15 PROCEDURE — 250N000013 HC RX MED GY IP 250 OP 250 PS 637: Performed by: SURGERY

## 2023-08-15 PROCEDURE — 272N000001 HC OR GENERAL SUPPLY STERILE: Performed by: SURGERY

## 2023-08-15 PROCEDURE — 250N000009 HC RX 250: Performed by: NURSE ANESTHETIST, CERTIFIED REGISTERED

## 2023-08-15 PROCEDURE — 250N000011 HC RX IP 250 OP 636: Mod: JZ | Performed by: NURSE ANESTHETIST, CERTIFIED REGISTERED

## 2023-08-15 PROCEDURE — 250N000009 HC RX 250: Performed by: SURGERY

## 2023-08-15 PROCEDURE — 360N000076 HC SURGERY LEVEL 3, PER MIN: Performed by: SURGERY

## 2023-08-15 PROCEDURE — 258N000003 HC RX IP 258 OP 636

## 2023-08-15 PROCEDURE — 999N000141 HC STATISTIC PRE-PROCEDURE NURSING ASSESSMENT: Performed by: SURGERY

## 2023-08-15 RX ORDER — SEVELAMER CARBONATE 800 MG/1
1600 TABLET, FILM COATED ORAL
Status: DISCONTINUED | OUTPATIENT
Start: 2023-08-16 | End: 2023-08-15

## 2023-08-15 RX ORDER — ACETAMINOPHEN 325 MG/1
975 TABLET ORAL EVERY 8 HOURS
Status: DISCONTINUED | OUTPATIENT
Start: 2023-08-15 | End: 2023-08-16 | Stop reason: HOSPADM

## 2023-08-15 RX ORDER — NALOXONE HYDROCHLORIDE 0.4 MG/ML
0.2 INJECTION, SOLUTION INTRAMUSCULAR; INTRAVENOUS; SUBCUTANEOUS
Status: DISCONTINUED | OUTPATIENT
Start: 2023-08-15 | End: 2023-08-16 | Stop reason: HOSPADM

## 2023-08-15 RX ORDER — SODIUM CHLORIDE, SODIUM LACTATE, POTASSIUM CHLORIDE, CALCIUM CHLORIDE 600; 310; 30; 20 MG/100ML; MG/100ML; MG/100ML; MG/100ML
INJECTION, SOLUTION INTRAVENOUS CONTINUOUS PRN
Status: DISCONTINUED | OUTPATIENT
Start: 2023-08-15 | End: 2023-08-15

## 2023-08-15 RX ORDER — PANTOPRAZOLE SODIUM 40 MG/1
40 TABLET, DELAYED RELEASE ORAL
Status: DISCONTINUED | OUTPATIENT
Start: 2023-08-16 | End: 2023-08-16 | Stop reason: HOSPADM

## 2023-08-15 RX ORDER — LIDOCAINE 40 MG/G
CREAM TOPICAL
Status: DISCONTINUED | OUTPATIENT
Start: 2023-08-15 | End: 2023-08-16 | Stop reason: HOSPADM

## 2023-08-15 RX ORDER — HYDROMORPHONE HCL IN WATER/PF 6 MG/30 ML
0.2 PATIENT CONTROLLED ANALGESIA SYRINGE INTRAVENOUS EVERY 5 MIN PRN
Status: DISCONTINUED | OUTPATIENT
Start: 2023-08-15 | End: 2023-08-15 | Stop reason: HOSPADM

## 2023-08-15 RX ORDER — FENTANYL CITRATE 50 UG/ML
50 INJECTION, SOLUTION INTRAMUSCULAR; INTRAVENOUS EVERY 5 MIN PRN
Status: DISCONTINUED | OUTPATIENT
Start: 2023-08-15 | End: 2023-08-15 | Stop reason: HOSPADM

## 2023-08-15 RX ORDER — PROCHLORPERAZINE MALEATE 10 MG
10 TABLET ORAL EVERY 6 HOURS PRN
Status: DISCONTINUED | OUTPATIENT
Start: 2023-08-15 | End: 2023-08-16 | Stop reason: HOSPADM

## 2023-08-15 RX ORDER — ONDANSETRON 2 MG/ML
4 INJECTION INTRAMUSCULAR; INTRAVENOUS EVERY 30 MIN PRN
Status: DISCONTINUED | OUTPATIENT
Start: 2023-08-15 | End: 2023-08-15 | Stop reason: HOSPADM

## 2023-08-15 RX ORDER — ONDANSETRON 2 MG/ML
INJECTION INTRAMUSCULAR; INTRAVENOUS PRN
Status: DISCONTINUED | OUTPATIENT
Start: 2023-08-15 | End: 2023-08-15

## 2023-08-15 RX ORDER — ACETAMINOPHEN 325 MG/1
650 TABLET ORAL EVERY 4 HOURS PRN
Status: DISCONTINUED | OUTPATIENT
Start: 2023-08-18 | End: 2023-08-16 | Stop reason: HOSPADM

## 2023-08-15 RX ORDER — HYDROMORPHONE HCL IN WATER/PF 6 MG/30 ML
0.2 PATIENT CONTROLLED ANALGESIA SYRINGE INTRAVENOUS
Status: DISCONTINUED | OUTPATIENT
Start: 2023-08-15 | End: 2023-08-16 | Stop reason: HOSPADM

## 2023-08-15 RX ORDER — ROPINIROLE 0.5 MG/1
0.5 TABLET, FILM COATED ORAL 2 TIMES DAILY
Status: DISCONTINUED | OUTPATIENT
Start: 2023-08-15 | End: 2023-08-16 | Stop reason: HOSPADM

## 2023-08-15 RX ORDER — ONDANSETRON 4 MG/1
4 TABLET, ORALLY DISINTEGRATING ORAL EVERY 30 MIN PRN
Status: DISCONTINUED | OUTPATIENT
Start: 2023-08-15 | End: 2023-08-15 | Stop reason: HOSPADM

## 2023-08-15 RX ORDER — HYDROXYZINE HYDROCHLORIDE 25 MG/1
25 TABLET, FILM COATED ORAL EVERY 6 HOURS PRN
Status: DISCONTINUED | OUTPATIENT
Start: 2023-08-15 | End: 2023-08-16 | Stop reason: HOSPADM

## 2023-08-15 RX ORDER — CEFAZOLIN SODIUM/WATER 2 G/20 ML
2 SYRINGE (ML) INTRAVENOUS
Status: COMPLETED | OUTPATIENT
Start: 2023-08-15 | End: 2023-08-15

## 2023-08-15 RX ORDER — LIDOCAINE HYDROCHLORIDE 20 MG/ML
INJECTION, SOLUTION INFILTRATION; PERINEURAL PRN
Status: DISCONTINUED | OUTPATIENT
Start: 2023-08-15 | End: 2023-08-15

## 2023-08-15 RX ORDER — ONDANSETRON 2 MG/ML
4 INJECTION INTRAMUSCULAR; INTRAVENOUS EVERY 6 HOURS PRN
Status: DISCONTINUED | OUTPATIENT
Start: 2023-08-15 | End: 2023-08-16 | Stop reason: HOSPADM

## 2023-08-15 RX ORDER — HYDROMORPHONE HCL IN WATER/PF 6 MG/30 ML
0.4 PATIENT CONTROLLED ANALGESIA SYRINGE INTRAVENOUS
Status: DISCONTINUED | OUTPATIENT
Start: 2023-08-15 | End: 2023-08-16 | Stop reason: HOSPADM

## 2023-08-15 RX ORDER — HEPARIN SODIUM 1000 [USP'U]/ML
INJECTION, SOLUTION INTRAVENOUS; SUBCUTANEOUS PRN
Status: DISCONTINUED | OUTPATIENT
Start: 2023-08-15 | End: 2023-08-15

## 2023-08-15 RX ORDER — ONDANSETRON 4 MG/1
4 TABLET, ORALLY DISINTEGRATING ORAL EVERY 6 HOURS PRN
Status: DISCONTINUED | OUTPATIENT
Start: 2023-08-15 | End: 2023-08-16 | Stop reason: HOSPADM

## 2023-08-15 RX ORDER — NALOXONE HYDROCHLORIDE 0.4 MG/ML
0.4 INJECTION, SOLUTION INTRAMUSCULAR; INTRAVENOUS; SUBCUTANEOUS
Status: DISCONTINUED | OUTPATIENT
Start: 2023-08-15 | End: 2023-08-16 | Stop reason: HOSPADM

## 2023-08-15 RX ORDER — POLYETHYLENE GLYCOL 3350 17 G/17G
17 POWDER, FOR SOLUTION ORAL DAILY
Status: DISCONTINUED | OUTPATIENT
Start: 2023-08-16 | End: 2023-08-16 | Stop reason: HOSPADM

## 2023-08-15 RX ORDER — BUPIVACAINE HYDROCHLORIDE 5 MG/ML
INJECTION, SOLUTION EPIDURAL; INTRACAUDAL
Status: DISCONTINUED
Start: 2023-08-15 | End: 2023-08-15 | Stop reason: HOSPADM

## 2023-08-15 RX ORDER — FENTANYL CITRATE 50 UG/ML
25 INJECTION, SOLUTION INTRAMUSCULAR; INTRAVENOUS EVERY 5 MIN PRN
Status: DISCONTINUED | OUTPATIENT
Start: 2023-08-15 | End: 2023-08-15 | Stop reason: HOSPADM

## 2023-08-15 RX ORDER — BUPIVACAINE HYDROCHLORIDE 5 MG/ML
INJECTION, SOLUTION PERINEURAL PRN
Status: DISCONTINUED | OUTPATIENT
Start: 2023-08-15 | End: 2023-08-15 | Stop reason: HOSPADM

## 2023-08-15 RX ORDER — HYDROMORPHONE HCL IN WATER/PF 6 MG/30 ML
0.4 PATIENT CONTROLLED ANALGESIA SYRINGE INTRAVENOUS EVERY 5 MIN PRN
Status: DISCONTINUED | OUTPATIENT
Start: 2023-08-15 | End: 2023-08-15 | Stop reason: HOSPADM

## 2023-08-15 RX ORDER — FENTANYL CITRATE 50 UG/ML
INJECTION, SOLUTION INTRAMUSCULAR; INTRAVENOUS PRN
Status: DISCONTINUED | OUTPATIENT
Start: 2023-08-15 | End: 2023-08-15

## 2023-08-15 RX ORDER — OXYCODONE HYDROCHLORIDE 5 MG/1
10 TABLET ORAL EVERY 4 HOURS PRN
Status: DISCONTINUED | OUTPATIENT
Start: 2023-08-15 | End: 2023-08-16 | Stop reason: HOSPADM

## 2023-08-15 RX ORDER — SODIUM CHLORIDE, SODIUM LACTATE, POTASSIUM CHLORIDE, CALCIUM CHLORIDE 600; 310; 30; 20 MG/100ML; MG/100ML; MG/100ML; MG/100ML
INJECTION, SOLUTION INTRAVENOUS CONTINUOUS
Status: DISCONTINUED | OUTPATIENT
Start: 2023-08-15 | End: 2023-08-15 | Stop reason: HOSPADM

## 2023-08-15 RX ORDER — PROPOFOL 10 MG/ML
INJECTION, EMULSION INTRAVENOUS CONTINUOUS PRN
Status: DISCONTINUED | OUTPATIENT
Start: 2023-08-15 | End: 2023-08-15

## 2023-08-15 RX ORDER — SODIUM CHLORIDE 9 MG/ML
INJECTION, SOLUTION INTRAVENOUS CONTINUOUS PRN
Status: DISCONTINUED | OUTPATIENT
Start: 2023-08-15 | End: 2023-08-15

## 2023-08-15 RX ORDER — AMOXICILLIN 250 MG
1 CAPSULE ORAL 2 TIMES DAILY
Status: DISCONTINUED | OUTPATIENT
Start: 2023-08-15 | End: 2023-08-16 | Stop reason: HOSPADM

## 2023-08-15 RX ORDER — HEPARIN SODIUM 1000 [USP'U]/ML
INJECTION, SOLUTION INTRAVENOUS; SUBCUTANEOUS
Status: DISCONTINUED
Start: 2023-08-15 | End: 2023-08-15 | Stop reason: HOSPADM

## 2023-08-15 RX ORDER — OXYCODONE HYDROCHLORIDE 5 MG/1
5 TABLET ORAL EVERY 4 HOURS PRN
Status: DISCONTINUED | OUTPATIENT
Start: 2023-08-15 | End: 2023-08-16 | Stop reason: HOSPADM

## 2023-08-15 RX ORDER — HEPARIN SODIUM 1000 [USP'U]/ML
INJECTION, SOLUTION INTRAVENOUS; SUBCUTANEOUS PRN
Status: DISCONTINUED | OUTPATIENT
Start: 2023-08-15 | End: 2023-08-15 | Stop reason: HOSPADM

## 2023-08-15 RX ORDER — TACROLIMUS 1 MG/1
1 CAPSULE ORAL EVERY 12 HOURS
Status: DISCONTINUED | OUTPATIENT
Start: 2023-08-15 | End: 2023-08-16 | Stop reason: HOSPADM

## 2023-08-15 RX ORDER — LACOSAMIDE 50 MG/1
100 TABLET ORAL EVERY 12 HOURS
Status: DISCONTINUED | OUTPATIENT
Start: 2023-08-15 | End: 2023-08-16 | Stop reason: HOSPADM

## 2023-08-15 RX ORDER — PROPOFOL 10 MG/ML
INJECTION, EMULSION INTRAVENOUS PRN
Status: DISCONTINUED | OUTPATIENT
Start: 2023-08-15 | End: 2023-08-15

## 2023-08-15 RX ORDER — LABETALOL HYDROCHLORIDE 5 MG/ML
10 INJECTION, SOLUTION INTRAVENOUS
Status: DISCONTINUED | OUTPATIENT
Start: 2023-08-15 | End: 2023-08-15 | Stop reason: HOSPADM

## 2023-08-15 RX ORDER — DEXAMETHASONE SODIUM PHOSPHATE 4 MG/ML
INJECTION, SOLUTION INTRA-ARTICULAR; INTRALESIONAL; INTRAMUSCULAR; INTRAVENOUS; SOFT TISSUE PRN
Status: DISCONTINUED | OUTPATIENT
Start: 2023-08-15 | End: 2023-08-15

## 2023-08-15 RX ORDER — LIDOCAINE HYDROCHLORIDE 10 MG/ML
INJECTION, SOLUTION INFILTRATION; PERINEURAL
Status: DISCONTINUED
Start: 2023-08-15 | End: 2023-08-15 | Stop reason: HOSPADM

## 2023-08-15 RX ORDER — EPHEDRINE SULFATE 50 MG/ML
INJECTION, SOLUTION INTRAMUSCULAR; INTRAVENOUS; SUBCUTANEOUS PRN
Status: DISCONTINUED | OUTPATIENT
Start: 2023-08-15 | End: 2023-08-15

## 2023-08-15 RX ORDER — SEVELAMER CARBONATE 800 MG/1
1600 TABLET, FILM COATED ORAL
Status: DISCONTINUED | OUTPATIENT
Start: 2023-08-15 | End: 2023-08-16 | Stop reason: HOSPADM

## 2023-08-15 RX ORDER — BISACODYL 10 MG
10 SUPPOSITORY, RECTAL RECTAL DAILY PRN
Status: DISCONTINUED | OUTPATIENT
Start: 2023-08-15 | End: 2023-08-16 | Stop reason: HOSPADM

## 2023-08-15 RX ADMIN — SEVELAMER CARBONATE 1600 MG: 800 TABLET, FILM COATED ORAL at 18:47

## 2023-08-15 RX ADMIN — SODIUM CHLORIDE: 9 INJECTION, SOLUTION INTRAVENOUS at 12:34

## 2023-08-15 RX ADMIN — FENTANYL CITRATE 25 MCG: 50 INJECTION, SOLUTION INTRAMUSCULAR; INTRAVENOUS at 14:29

## 2023-08-15 RX ADMIN — PHENYLEPHRINE HYDROCHLORIDE 100 MCG: 10 INJECTION INTRAVENOUS at 15:48

## 2023-08-15 RX ADMIN — TACROLIMUS 1 MG: 1 CAPSULE ORAL at 20:48

## 2023-08-15 RX ADMIN — PHENYLEPHRINE HYDROCHLORIDE 100 MCG: 10 INJECTION INTRAVENOUS at 13:20

## 2023-08-15 RX ADMIN — DEXAMETHASONE SODIUM PHOSPHATE 4 MG: 4 INJECTION, SOLUTION INTRA-ARTICULAR; INTRALESIONAL; INTRAMUSCULAR; INTRAVENOUS; SOFT TISSUE at 14:05

## 2023-08-15 RX ADMIN — Medication 5 MG: at 15:37

## 2023-08-15 RX ADMIN — PROPOFOL 20 MCG/KG/MIN: 10 INJECTION, EMULSION INTRAVENOUS at 12:45

## 2023-08-15 RX ADMIN — PHENYLEPHRINE HYDROCHLORIDE 100 MCG: 10 INJECTION INTRAVENOUS at 13:54

## 2023-08-15 RX ADMIN — LACOSAMIDE 100 MG: 50 TABLET, FILM COATED ORAL at 20:48

## 2023-08-15 RX ADMIN — MIDAZOLAM 0.5 MG: 1 INJECTION INTRAMUSCULAR; INTRAVENOUS at 12:34

## 2023-08-15 RX ADMIN — OXYCODONE HYDROCHLORIDE 5 MG: 5 TABLET ORAL at 18:27

## 2023-08-15 RX ADMIN — OXYCODONE HYDROCHLORIDE 10 MG: 5 TABLET ORAL at 22:21

## 2023-08-15 RX ADMIN — SENNOSIDES AND DOCUSATE SODIUM 1 TABLET: 50; 8.6 TABLET ORAL at 20:48

## 2023-08-15 RX ADMIN — PROPOFOL 150 MG: 10 INJECTION, EMULSION INTRAVENOUS at 12:42

## 2023-08-15 RX ADMIN — PHENYLEPHRINE HYDROCHLORIDE 100 MCG: 10 INJECTION INTRAVENOUS at 12:57

## 2023-08-15 RX ADMIN — ROPINIROLE 0.5 MG: 0.5 TABLET, FILM COATED ORAL at 20:48

## 2023-08-15 RX ADMIN — PHENYLEPHRINE HYDROCHLORIDE 0.3 MCG/KG/MIN: 10 INJECTION INTRAVENOUS at 13:23

## 2023-08-15 RX ADMIN — ACETAMINOPHEN 975 MG: 325 TABLET, FILM COATED ORAL at 22:21

## 2023-08-15 RX ADMIN — FENTANYL CITRATE 25 MCG: 50 INJECTION, SOLUTION INTRAMUSCULAR; INTRAVENOUS at 14:08

## 2023-08-15 RX ADMIN — PHENYLEPHRINE HYDROCHLORIDE 100 MCG: 10 INJECTION INTRAVENOUS at 13:31

## 2023-08-15 RX ADMIN — HEPARIN SODIUM 3000 UNITS: 1000 INJECTION, SOLUTION INTRAVENOUS; SUBCUTANEOUS at 13:49

## 2023-08-15 RX ADMIN — MIDAZOLAM 0.5 MG: 1 INJECTION INTRAMUSCULAR; INTRAVENOUS at 12:39

## 2023-08-15 RX ADMIN — PHENYLEPHRINE HYDROCHLORIDE 100 MCG: 10 INJECTION INTRAVENOUS at 12:59

## 2023-08-15 RX ADMIN — PHENYLEPHRINE HYDROCHLORIDE 100 MCG: 10 INJECTION INTRAVENOUS at 13:10

## 2023-08-15 RX ADMIN — LIDOCAINE HYDROCHLORIDE 80 MG: 20 INJECTION, SOLUTION INFILTRATION; PERINEURAL at 12:42

## 2023-08-15 RX ADMIN — FENTANYL CITRATE 50 MCG: 50 INJECTION, SOLUTION INTRAMUSCULAR; INTRAVENOUS at 12:42

## 2023-08-15 RX ADMIN — Medication 2 G: at 12:34

## 2023-08-15 RX ADMIN — FENTANYL CITRATE 50 MCG: 50 INJECTION, SOLUTION INTRAMUSCULAR; INTRAVENOUS at 16:20

## 2023-08-15 RX ADMIN — PHENYLEPHRINE HYDROCHLORIDE 100 MCG: 10 INJECTION INTRAVENOUS at 14:44

## 2023-08-15 RX ADMIN — ONDANSETRON 4 MG: 2 INJECTION INTRAMUSCULAR; INTRAVENOUS at 15:27

## 2023-08-15 ASSESSMENT — ACTIVITIES OF DAILY LIVING (ADL)
ADLS_ACUITY_SCORE: 35

## 2023-08-15 ASSESSMENT — ENCOUNTER SYMPTOMS: SEIZURES: 1

## 2023-08-15 NOTE — INTERVAL H&P NOTE
I have reviewed the surgical (or preoperative) H&P that is linked to this encounter, and examined the patient. There are no significant changes    Clinical Conditions Present on Arrival:  Clinically Significant Risk Factors Present on Admission                # Drug Induced Coagulation Defect: home medication list includes an anticoagulant medication  # Thrombocytopenia: Lowest platelets = 82 in last 30 days, will monitor for bleeding  # Overweight: Estimated body mass index is 26.45 kg/m  as calculated from the following:    Height as of this encounter: 1.829 m (6').    Weight as of this encounter: 88.5 kg (195 lb).

## 2023-08-15 NOTE — BRIEF OP NOTE
[facility]    Brief Operative Note    Pre-operative diagnosis: Malfunction of arteriovenous dialysis fistula (H) [T82.590A]  Post-operative diagnosis Same as pre-operative diagnosis    Procedure: Procedure(s):  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 2; OUTFLOW REVISION CEPHALIC TO BRACHIAL VEIN  Surgeon: Surgeon(s) and Role:     * Deejay Mcneil MD - Primary     * Kalyani Neal MD - Resident - Assisting  Anesthesia: General   Estimated Blood Loss: Less than 100 ml    Drains: Billy-Buck  Specimens: * No specimens in log *  Findings:   Two pseudoaneurysms noted along the cephalic outflow limb which were excised  . cephalic outflow limb was ligated proximally and transposed to basilic vein medially.   Complications: None.  Implants: * No implants in log *

## 2023-08-15 NOTE — ANESTHESIA PROCEDURE NOTES
Airway       Patient location during procedure: OR  Staff -        Anesthesiologist:  Keven Land MD       CRNA: Perry Ramey APRN CRNA       Performed By: CRNA  Consent for Airway        Urgency: elective  Indications and Patient Condition       Indications for airway management: josé luis-procedural       Induction type:intravenous       Mask difficulty assessment: 0 - not attempted    Final Airway Details       Final airway type: supraglottic airway    Supraglottic Airway Details        Type: LMA       Brand: Ambu AuraGain       LMA size: 5    Post intubation assessment        Placement verified by: capnometry, equal breath sounds and chest rise        Number of attempts at approach: 1       Number of other approaches attempted: 0       Secured with: plastic tape       Ease of procedure: easy       Dentition: Intact and Unchanged

## 2023-08-15 NOTE — OP NOTE
Procedure Date: 08/15/2023    PREOPERATIVE DIAGNOSIS:  Failing left upper arm brachiocephalic arteriovenous fistula secondary to outflow stenosis in 2 pseudoaneurysms.    POSTOPERATIVE DIAGNOSIS:  Failing left upper arm brachiocephalic arteriovenous fistula secondary to outflow stenosis in 2 pseudoaneurysms.    PROCEDURE:    1.  Repair of left upper arm brachial to cephalic arteriovenous fistula pseudoaneurysms x2.  2.  Outflow revision of proximal cephalic vein to brachial vein.  A. Mobilization of arteriovenous fistula with ligation of side branches.    SURGEON:  Deejay Mcneil MD    : Kalyani Neal MD ([facility] [place], [place] Surgery Resident)        ANESTHESIA:  General -- LMA.    PREOPERATIVE MEDICATIONS:  Ancef 2 g IV.    INDICATIONS FOR PROCEDURE:  This 58-year-old patient has a left upper arm brachiocephalic fistula.  He is dialyzing via right jugular tunneled catheter.  He has developed 2 large pseudoaneurysms, 1 measuring 4 cm and the other 2 cm in the distal 1/3 of the fistula.  The fistula is otherwise very well matured and has multiple side branches.  However, in the upper 1/3 of the arm, the outflow cephalic vein is occluded.  Outflow is via these multiple side branches that have very high-grade stenoses.  We felt that repair of the pseudoaneurysm was indicated with outflow revision into the widely patent basilic vein system.      The patient comes to the operating room today under informed consent.    DESCRIPTION OF PROCEDURE:  The patient was brought to the operating room.  Induced under general anesthesia.  LMA was placed.    Duplex ultrasound:  Duplex ultrasound was used to identify the occluded segment of the upper portion of the fistula, which was easily marked on the skin, and we identified multiple other side branches.  We also ensured that the brachial vein was widely patent, and this was marked on the skin in this very thin patient.    Vascular exposure:  Left arm and axilla were prepped and draped.   Timeout was called, and the sites were identified.  Lidocaine 1% was injected in field block fashion.  We made an incision on the distal 1/3 of the forearm over the fistula, so we could identify both pseudoaneurysms.  The inflow artery close to the elbow measured at least 6 mm in diameter and was encircled with Silastic vessel loops as well as the outflow vein.  We then dissected free both pseudoaneurysms circumferentially.  Both these were coming off the medial aspect of the fistula, which otherwise was unremarkable.    Mobilization of the upper arm fistula:  We then worked under a skin flap and completely mobilized the fistula, making a second incision in the upper arm.  We identified multiple side branches, and these were ligated between 3-0 silk sutures, and the fistula was completely mobilized to its occluded segment going into a scarred cephalic vein.  We did leave the largest fistula open so we would still have flow within the fistula.  We then dissected down to expose an excellent, approximately 6 mm brachial vein.  This was encircled with vessel loops proximally and distally and preserving a large posterior side branch.  The fistula was completely mobilized from the pseudoaneurysms down to the upper arm.    Repair of pseudoaneurysm:  Heparin 3000 units intravenous were given.  The vessel loops were tightened on the inflow and outflow.  We entered the largest pseudoaneurysm distally.  A large amount of organized thrombus was removed.  We found a small opening measuring approximately 2 mm in the fistula.  This was repaired with 2 mattress 6-0 Prolene sutures along with another 3rd mattress 6-0 Prolene suture.  With release of the vessel loops, we had a strong pulse and good hemostasis.  We had a good diameter of the normal fistula.  We excised the redundant aneurysm wall.    Then, we directed our attention to the smaller 2 cm aneurysm.  Vessel loops were retightened.  Again, we entered the pseudoaneurysm and  removed the thrombus.  This pseudoaneurysm had actually healed over, and there was no bleeding.  A redundant portion of the pseudoaneurysmal wall was excised.    Outflow revision:  We then ligated the large side branch closer to the shoulder region and ligated the cephalic vein, which was transected.  We completely mobilized this portion of the fistula.  This was spatulated out on a large side branch.  Heparinized saline solution was injected, and this easily accepted a 6 mm dilator down to the antecubital area.  This was brought in a very gentle curve up to the brachial vein.  Vessel loops were redone, not tightened.  A 1 cm venotomy was made and a end-to-side anastomosis with a very gentle curve was performed with running 6-0 Prolene suture under loupe magnification.    With release of the vessel loops, we had an excellent pulse and thrill within the fistula and very good hemostasis and an anastomosis that dilated very nicely.  We had a very gentle curve with no tension down to the outflow anastomosis vein.    We did have some overall generalized oozing related to his significant mobilization of the fistula and his uremia.  We dried this up as much as possible and had good results, but were very concerned that he may have ongoing bleeding.  We therefore left a #15 fluted drain that was brought through a proximal stab incision and placed around dissected fistula.    Wounds were infiltrated with 0.5% Marcaine for postop analgesia.  Subcutaneous tissue was closed with a running 5-0 Monocryl in subcuticular fashion.  Gauze and Kaden rolls were applied.    The patient tolerated the procedure well.    ESTIMATED BLOOD LOSS:  100 mL.    COMPLICATIONS:  None.    The procedure was more difficult due to the extensive mobilization to preserve the very well developed, arterialized cephalic vein for dialysis.        Deejay Mcneil MD        D: 08/15/2023   T: 08/15/2023   MT: OBED    Name:     MARY JO CRAIN  MRN:       [id number]        Account:        [id number]   :      1965           Procedure Date: 08/15/2023     Document: [id number]

## 2023-08-15 NOTE — ANESTHESIA CARE TRANSFER NOTE
Patient: Blanco Osborne    Procedure: Procedure(s):  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 2; OUTFLOW REVISION CEPHALIC TO BRACHIAL VEIN       Diagnosis: Malfunction of arteriovenous dialysis fistula (H) [T82.590A]  Diagnosis Additional Information: No value filed.    Anesthesia Type:   General     Note:    Oropharynx: oropharynx clear of all foreign objects and spontaneously breathing  Level of Consciousness: awake  Oxygen Supplementation: face mask  Level of Supplemental Oxygen (L/min / FiO2): 10  Independent Airway: airway patency satisfactory and stable  Dentition: dentition unchanged  Vital Signs Stable: post-procedure vital signs reviewed and stable  Report to RN Given: handoff report given  Patient transferred to: PACU  Comments: Pt awake and able to verbalize needs. ALL monitors on and audible. Spontaneous respiration without difficulty. o2 sats 100%. Pt denies pain. Report given to PACU RN.  Handoff Report: Identifed the Patient, Identified the Reponsible Provider, Reviewed the pertinent medical history, Discussed the surgical course, Reviewed Intra-OP anesthesia mangement and issues during anesthesia, Set expectations for post-procedure period and Allowed opportunity for questions and acknowledgement of understanding      Vitals:  Vitals Value Taken Time   /68 08/15/23 1601   Temp     Pulse 65 08/15/23 1604   Resp 16 08/15/23 1604   SpO2 100 % 08/15/23 1604   Vitals shown include unvalidated device data.    Electronically Signed By: STEFFANY Mares CRNA  August 15, 2023  4:05 PM

## 2023-08-15 NOTE — ANESTHESIA POSTPROCEDURE EVALUATION
Patient: Blanco Osborne    Procedure: Procedure(s):  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 2; OUTFLOW REVISION CEPHALIC TO BRACHIAL VEIN       Anesthesia Type:  General    Note:  Disposition: Outpatient   Postop Pain Control: Uneventful            Sign Out: Well controlled pain   PONV: No   Neuro/Psych: Uneventful            Sign Out: Acceptable/Baseline neuro status   Airway/Respiratory: Uneventful            Sign Out: Acceptable/Baseline resp. status   CV/Hemodynamics: Uneventful            Sign Out: Acceptable CV status; No obvious hypovolemia; No obvious fluid overload   Other NRE: NONE   DID A NON-ROUTINE EVENT OCCUR? No           Last vitals:  Vitals Value Taken Time   /66 08/15/23 1630   Temp 36.2  C (97.2  F) 08/15/23 1600   Pulse 69 08/15/23 1638   Resp 13 08/15/23 1638   SpO2 93 % 08/15/23 1638   Vitals shown include unvalidated device data.    Electronically Signed By: KATY GUZMAN MD  August 15, 2023  4:39 PM

## 2023-08-15 NOTE — ANESTHESIA PREPROCEDURE EVALUATION
Anesthesia Pre-Procedure Evaluation    Patient: Blanco Osborne   MRN: [id number] : 1965        Procedure : Procedure(s):  REPAIR OF LEFT ARM FISTULA PSEUDOANEURYSM; OUTFLOW REVISION OF BASILIC VEIN          Past Medical History:   Diagnosis Date    Acquired immunocompromised state (H) 2022    Acute renal failure, unspecified acute renal failure type (H) 2022    Antiplatelet or antithrombotic long-term use     Arrhythmia     PEA    Ascites     Aspergillus pneumonia (H) 2022    Cancer (H)     Squamous Cell Cancer- Since resolved    Cirrhosis of liver with ascites (H) 2016    Coagulopathy (H)     Critical illness myopathy     Dialysis patient (H)     DIALYSIS 3X/ WEEK    Encephalopathy     ESRD (end stage renal disease) on dialysis (H)     Gastroesophageal reflux disease     H/O alcohol abuse     History of blood transfusion     Hyperammonemia (H)     Hyperlipidemia     Hypertension     Patients states that HTN has been resolved    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    Insomnia     Lactic acidosis 2022    Liver replaced by transplant (H) 2016    NAFLD (nonalcoholic fatty liver disease) 2016    CHRISTIAN on CPAP     Parainfluenza type 1 infection 2022    Parapneumonic effusion     Pleural effusion     Pneumothorax on left 2022    Respiratory acidosis 2022    Respiratory arrest (H) 2022    Restless legs syndrome (RLS)     SBP (spontaneous bacterial peritonitis) (H)     MNGI    Seizures (H) 2022    Sepsis due to Streptococcus pneumoniae with acute hypoxic respiratory failure (H) 2022    Stroke, embolic (H) 2022    Sudden cardiac arrest (H) 2022    PEA- 2 min of CPR    Tubular adenoma 10/2019    Large cecal adenoma- due for surveillance colonoscopy in 3 years (10/2022)      Past Surgical History:   Procedure Laterality Date    APPENDECTOMY      BENCH LIVER N/A 2016    Procedure: BENCH LIVER;  Surgeon:  Enoc Crews MD;  Location: [facility] OR    BRONCHOSCOPY FLEXIBLE AND RIGID N/A 2022    Procedure: BRONCHOSCOPY;  Surgeon: Alena Valenzuela MD;  Location:  GI    COLONOSCOPY  2013    repeat in 2018    COLONOSCOPY N/A 10/04/2019    Procedure: COLONOSCOPY, WITH POLYPECTOMY AND BIOPSY;  Surgeon: Go Chong MD;  Location:  OR    CREATE FISTULA ARTERIOVENOUS UPPER EXTREMITY Left 2023    Procedure: LEFT UPPER EXTREMITY ARTERIOVENOUS FISTULA CREATION. LIGATION OF COMPETING VASCULAR BRANCHES- LEFT;  Surgeon: Deejay Mcneil MD;  Location:  OR    HERNIA REPAIR      IR CHEST TUBE PLACEMENT NON-TUNNELED RIGHT  2022    IR CVC TUNNEL PLACEMENT > 5 YRS OF AGE  2022    IR DIALYSIS FISTULOGRAM LEFT  2023    IR GASTROSTOMY TUBE CHANGE  2023    IR GASTROSTOMY TUBE PERCUTANEOUS PLCMNT  2022    IR PARACENTESIS  2022    IR PARACENTESIS  2022    IR THORACENTESIS  2022    IR THORACENTESIS  2020    IR THORACENTESIS  08/10/2020    IR TRANSCATHETER BIOPSY  2022    TRACHEOSTOMY N/A 2022    Procedure: TRACHEOSTOMY;  Surgeon: Nilesh Jackson MD;  Location:  OR    TRANSPLANT LIVER RECIPIENT  DONOR N/A 2016    Procedure: TRANSPLANT LIVER RECIPIENT  DONOR;  Surgeon: Enoc Crews MD;  Location: [facility] OR      No Known Allergies   Social History     Tobacco Use    Smoking status: Never    Smokeless tobacco: Never   Substance Use Topics    Alcohol use: Not Currently     Alcohol/week: 0.0 standard drinks of alcohol      Wt Readings from Last 1 Encounters:   08/15/23 88.5 kg (195 lb)        Anesthesia Evaluation            ROS/MED HX  ENT/Pulmonary:     (+) sleep apnea,                                      Neurologic: Comment: RLS;    (+)       seizures,   CVA,                      Cardiovascular: Comment: Hx PEA;    (+) Dyslipidemia hypertension- -   -  - -   Taking blood thinners                                    METS/Exercise Tolerance:     Hematologic:       Musculoskeletal:       GI/Hepatic: Comment: Hx liver transplant;    (+) GERD,            liver disease,       Renal/Genitourinary:     (+) renal disease, type: ESRD, Pt requires dialysis,           Endo:       Psychiatric/Substance Use:     (+)   alcohol abuse      Infectious Disease:       Malignancy:       Other: Comment: Acquired immunocompromised state           Physical Exam    Airway        Mallampati: I   TM distance: > 3 FB   Neck ROM: full   Mouth opening: > 3 cm    Respiratory Devices and Support         Dental       (+) Minor Abnormalities - some fillings, tiny chips      Cardiovascular   cardiovascular exam normal          Pulmonary   pulmonary exam normal                OUTSIDE LABS:  CBC:   Lab Results   Component Value Date    WBC 2.9 (L) 08/10/2023    WBC 3.8 (L) 07/11/2023    HGB 9.1 (L) 08/10/2023    HGB 8.7 (L) 07/11/2023    HCT 28.5 (L) 08/10/2023    HCT 28.1 (L) 07/11/2023    PLT 82 (L) 08/10/2023     (L) 07/11/2023     BMP:   Lab Results   Component Value Date     08/10/2023     07/13/2023    POTASSIUM 4.3 08/10/2023    POTASSIUM 4.1 07/13/2023    CHLORIDE 95 (L) 08/10/2023    CHLORIDE 97 (L) 07/13/2023    CO2 32 (H) 08/10/2023    CO2 30 (H) 07/13/2023    BUN 45.6 (H) 08/10/2023    BUN 37.2 (H) 07/13/2023    CR 5.76 (H) 08/10/2023    CR 5.13 (H) 07/13/2023     (H) 08/10/2023    GLC 94 07/13/2023     COAGS:   Lab Results   Component Value Date    PTT 39 (H) 12/21/2022    INR 1.36 (H) 12/21/2022    FIBR 766 (H) 11/14/2022     POC:   Lab Results   Component Value Date     (H) 09/28/2016     HEPATIC:   Lab Results   Component Value Date    ALBUMIN 4.2 08/10/2023    PROTTOTAL 7.6 08/10/2023    ALT 7 08/10/2023    AST 25 08/10/2023    ALKPHOS 196 (H) 08/10/2023    BILITOTAL 0.5 08/10/2023    CHANDLER 69 (H) 03/25/2023     OTHER:   Lab Results   Component Value Date    PH 7.30 (L) 03/05/2023    LACT 1.3  04/06/2023    A1C 4.7 08/10/2023    KELLY 9.5 08/10/2023    PHOS 6.6 (H) 04/28/2023    MAG 2.4 (H) 04/24/2023    LIPASE 63 (L) 09/22/2016    AMYLASE 72 09/22/2016    TSH 4.89 (H) 03/25/2023    T4 0.92 03/25/2023    CRP 22.3 (H) 11/22/2022       Anesthesia Plan    ASA Status:  4    NPO Status:  NPO Appropriate    Anesthesia Type: General.     - Airway: LMA   Induction: Intravenous.   Maintenance: Balanced.        Consents    Anesthesia Plan(s) and associated risks, benefits, and realistic alternatives discussed. Questions answered and patient/representative(s) expressed understanding.     - Discussed:     - Discussed with:  Patient            Postoperative Care       PONV prophylaxis: Ondansetron (or other 5HT-3)     Comments:                KATY GUZMAN MD

## 2023-08-16 VITALS
SYSTOLIC BLOOD PRESSURE: 109 MMHG | HEIGHT: 72 IN | BODY MASS INDEX: 26.34 KG/M2 | WEIGHT: 194.5 LBS | RESPIRATION RATE: 17 BRPM | DIASTOLIC BLOOD PRESSURE: 62 MMHG | OXYGEN SATURATION: 95 % | TEMPERATURE: 97.7 F | HEART RATE: 67 BPM

## 2023-08-16 LAB
BASOPHILS # BLD AUTO: 0 10E3/UL (ref 0–0.2)
BASOPHILS NFR BLD AUTO: 0 %
EOSINOPHIL # BLD AUTO: 0 10E3/UL (ref 0–0.7)
EOSINOPHIL NFR BLD AUTO: 0 %
ERYTHROCYTE [DISTWIDTH] IN BLOOD BY AUTOMATED COUNT: 14 % (ref 10–15)
GLUCOSE BLDC GLUCOMTR-MCNC: 101 MG/DL (ref 70–99)
HCT VFR BLD AUTO: 27.2 % (ref 40–53)
HGB BLD-MCNC: 8.5 G/DL (ref 13.3–17.7)
IMM GRANULOCYTES # BLD: 0 10E3/UL
IMM GRANULOCYTES NFR BLD: 0 %
INR PPP: 1.29 (ref 0.85–1.15)
LYMPHOCYTES # BLD AUTO: 1 10E3/UL (ref 0.8–5.3)
LYMPHOCYTES NFR BLD AUTO: 30 %
MCH RBC QN AUTO: 31.1 PG (ref 26.5–33)
MCHC RBC AUTO-ENTMCNC: 31.3 G/DL (ref 31.5–36.5)
MCV RBC AUTO: 100 FL (ref 78–100)
MONOCYTES # BLD AUTO: 0.4 10E3/UL (ref 0–1.3)
MONOCYTES NFR BLD AUTO: 10 %
NEUTROPHILS # BLD AUTO: 2 10E3/UL (ref 1.6–8.3)
NEUTROPHILS NFR BLD AUTO: 60 %
NRBC # BLD AUTO: 0 10E3/UL
NRBC BLD AUTO-RTO: 0 /100
PLATELET # BLD AUTO: 73 10E3/UL (ref 150–450)
POTASSIUM SERPL-SCNC: 4.4 MMOL/L (ref 3.4–5.3)
RBC # BLD AUTO: 2.73 10E6/UL (ref 4.4–5.9)
WBC # BLD AUTO: 3.5 10E3/UL (ref 4–11)

## 2023-08-16 PROCEDURE — 250N000013 HC RX MED GY IP 250 OP 250 PS 637: Performed by: SURGERY

## 2023-08-16 PROCEDURE — 85025 COMPLETE CBC W/AUTO DIFF WBC: CPT | Performed by: STUDENT IN AN ORGANIZED HEALTH CARE EDUCATION/TRAINING PROGRAM

## 2023-08-16 PROCEDURE — 85610 PROTHROMBIN TIME: CPT | Performed by: STUDENT IN AN ORGANIZED HEALTH CARE EDUCATION/TRAINING PROGRAM

## 2023-08-16 PROCEDURE — 250N000013 HC RX MED GY IP 250 OP 250 PS 637: Performed by: STUDENT IN AN ORGANIZED HEALTH CARE EDUCATION/TRAINING PROGRAM

## 2023-08-16 PROCEDURE — 96374 THER/PROPH/DIAG INJ IV PUSH: CPT

## 2023-08-16 PROCEDURE — G0378 HOSPITAL OBSERVATION PER HR: HCPCS

## 2023-08-16 PROCEDURE — 250N000012 HC RX MED GY IP 250 OP 636 PS 637: Performed by: STUDENT IN AN ORGANIZED HEALTH CARE EDUCATION/TRAINING PROGRAM

## 2023-08-16 PROCEDURE — 36415 COLL VENOUS BLD VENIPUNCTURE: CPT | Performed by: STUDENT IN AN ORGANIZED HEALTH CARE EDUCATION/TRAINING PROGRAM

## 2023-08-16 PROCEDURE — 84132 ASSAY OF SERUM POTASSIUM: CPT | Performed by: STUDENT IN AN ORGANIZED HEALTH CARE EDUCATION/TRAINING PROGRAM

## 2023-08-16 RX ORDER — ACETAMINOPHEN 325 MG/1
650 TABLET ORAL EVERY 4 HOURS PRN
Status: ON HOLD | COMMUNITY
Start: 2023-08-18 | End: 2023-10-30

## 2023-08-16 RX ORDER — OXYCODONE HYDROCHLORIDE 5 MG/1
5 TABLET ORAL EVERY 4 HOURS PRN
Qty: 6 TABLET | Refills: 0 | Status: ON HOLD | OUTPATIENT
Start: 2023-08-16 | End: 2023-11-01

## 2023-08-16 RX ADMIN — LACOSAMIDE 100 MG: 50 TABLET, FILM COATED ORAL at 08:26

## 2023-08-16 RX ADMIN — PANTOPRAZOLE SODIUM 40 MG: 40 TABLET, DELAYED RELEASE ORAL at 06:55

## 2023-08-16 RX ADMIN — SEVELAMER CARBONATE 1600 MG: 800 TABLET, FILM COATED ORAL at 08:26

## 2023-08-16 RX ADMIN — TACROLIMUS 1 MG: 1 CAPSULE ORAL at 08:14

## 2023-08-16 RX ADMIN — OXYCODONE HYDROCHLORIDE 10 MG: 5 TABLET ORAL at 08:14

## 2023-08-16 RX ADMIN — HYDROXYZINE HYDROCHLORIDE 25 MG: 25 TABLET, FILM COATED ORAL at 02:42

## 2023-08-16 RX ADMIN — ACETAMINOPHEN 975 MG: 325 TABLET, FILM COATED ORAL at 06:16

## 2023-08-16 RX ADMIN — OXYCODONE HYDROCHLORIDE 10 MG: 5 TABLET ORAL at 02:42

## 2023-08-16 RX ADMIN — ROPINIROLE 0.5 MG: 0.5 TABLET, FILM COATED ORAL at 08:26

## 2023-08-16 ASSESSMENT — ACTIVITIES OF DAILY LIVING (ADL)
ADLS_ACUITY_SCORE: 37
ADLS_ACUITY_SCORE: 35
ADLS_ACUITY_SCORE: 37

## 2023-08-16 NOTE — PLAN OF CARE
Orientation: AO x4     Vitals/Tele: VSS RA, pain managed w/ oxycodone and scheduled tylenol.     IV Access/drains: LAZARUS drain to LUE, R PIV SL, L AV fistula w/ + bruit/thrill    Diet: Low saturated fat     Mobility: not OOB this shift, A1 w/ gb/walker    GI/: Pt on hemodialysis, no BM this shift. Calls appropriately.     Wound/Skin: stage 2 pressure injury to sacrum/coccyx- mepilex CDI, scattered bruises/scratches    Discharge Plan: pending    Pt worried about missing dialysis today, wondering if he would be able to have it here as he won't be able to make it to his dialysis center d/t surgery. Called dialysis, waiting for a call back.     See Flow sheets for assessment

## 2023-08-16 NOTE — PROGRESS NOTES
Vascular Surgery Progress Note:  POD#1    S: Very comfortable this morning.  Anxious to go home.      O:   Vitals:  BP  Min: 87/47  Max: 119/72  Temp  Av.7  F (36.5  C)  Min: 97.2  F (36.2  C)  Max: 98.3  F (36.8  C)  Pulse  Av  Min: 57  Max: 70  I/O last 3 completed shifts:  In: 940 [P.O.:240; I.V.:700]  Out: 63 [Drains:63]    Physical Exam: Alert and appropriate.  Very comfortable.    Dressings removed.Wd=A no swelling or ecchymosis    +3 pulses revised AVF    Minimal LAZARUS output--removed      Redressed with Kerlix.      Assessment/Plan: #1.  Doing well following significant revision left upper arm fistula.  This will need approximately 4 weeks to mature.  Does have a right jugular tunneled catheter that he will use for his hemodialysis.     #2.  Dialyzes every [facility] at the [facility] unit in the morning.  He will check with the unit to see if he can dialyze later today otherwise plan dialysis at the hospital prior to discharge using right jugular tunneled catheter.     #3.  On chronic Eliquis.  He will restart this this evening with his home medications after discharge.      Wm. Tarun MD

## 2023-08-16 NOTE — PLAN OF CARE
A&O, VSS on RA, independent in transfers, Oxycodone x 1 for pain, discharge instruction done, questions encouraged and answered, patient and caregiver acknowledged understanding.

## 2023-08-16 NOTE — PROGRESS NOTES
Smoking cessation order received. Per chart pt not a smoker and never has been a smoker. No needs. Will complete orders.

## 2023-08-16 NOTE — DISCHARGE SUMMARY
[facility]  VASCULAR SURGERY  DISCHARGE SUMMARY    Blanco Osborne MRN# [id number]   YOB: 1965 Age: 58 year old     Date of Admission:  8/15/2023  Date of Discharge::  8/16/2023 10:00 AM  Admitting Physician:  Deejay Mcneil MD  Discharge Physician:  Deejay Mcneil MD  Primary Care Physician:        Ki Carter          Admission Diagnoses:   Malfunction of arteriovenous dialysis fistula (H) [T82.590A]  Dialysis AV fistula malfunction, sequela [T82.590S]            Discharge Diagnosis:   Same as above           Procedures:     Procedure(s):  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 2; OUTFLOW REVISION CEPHALIC TO BRACHIAL VEIN          Consultations:   SMOKING CESSATION PROGRAM IP CONSULT  NEPHROLOGY IP CONSULT  NEPHROLOGY IP CONSULT          Brief History of Illness:   From Dr. Mcneil's operative report dated 8/15/2023:    This 58-year-old patient has a left upper arm brachiocephalic fistula. He is dialyzing via right jugular tunneled catheter. He has developed 2 large pseudoaneurysms, 1 measuring 4 cm and the other 2 cm in the distal 1/3 of the fistula. The fistula is otherwise very well matured and has multiple side branches. However, in the upper 1/3 of the arm, the outflow cephalic vein is occluded. Outflow is via these multiple side branches that have very high-grade stenoses. We felt that repair of the pseudoaneurysm was indicated with outflow revision into the widely patent basilic vein system.              Hospital Course:   The patient underwent left upper arm brachial to cephalic AV fistula pseudoaneurysm repair and outflow revision which was he tolerated well without immediate complications. He was transferred to the PACU and then floor for routine postoperative care. The remainder of his postoperative course was unremarkable. Drain was removed on POD#1 and he discharged home in stable condition with appropriate follow-up in place. He will dialysis at his  outpatient dialysis unit later on the day of discharge.           Imaging Studies:     Results for orders placed or performed during the hospital encounter of 07/13/23   IR Dialysis Fistulogram Left    Narrative    INTERVENTIONAL RADIOLOGY DIALYSIS FISTULOGRAM  7/13/2023 11:56 AM    HISTORY:  58-year-old patient with history of end-stage renal disease  and difficulty accessing fistula in the left upper arm, created in  March 2023. Request made for evaluation. No previous imaging.    TECHNIQUE: Patient was brought to the interventional radiology  department and informed consent obtained. Patient was placed in a  supine position. Skin overlying the left upper arm was prepped and  draped in standard sterile fashion. Ultrasound was used to visualize  the fistula, patency is confirmed, and image stored for documentation.  Two pseudoaneurysms also noted. After 1% lidocaine administration,  micropuncture kit was used to access the fistula at a perpendicular  angle to maintain both retrograde and antegrade access. Wire was  initially advanced into a retrograde position and fistulogram was  performed. Wire was advanced retrograde into the brachial artery,  followed by the catheter. 6 Croatian vascular sheath was placed. A Velasquez  wire was placed and 5 mm and 6 mm balloon angioplasty performed of the  AV anastomosis to be certain of adequate inflow. Good result noted at  completion. Moderate stenosis noted in the outflow vein adjacent to  pseudoaneurysm, mild stenosis adjacent to smaller pseudoaneurysm. The  outflow vein is noted to be chronically occluded with multiple  collateralized veins. Area of occlusion was accessed with a stiff  angled Glidewire and catheter, though vein is atretic. Therefore, wire  was placed and 6 mm and 7 mm Baker balloons used in area of stenosis  by the pseudoaneurysms. Good result on completion. However,  unfortunately, pseudoaneurysms remain patent.    Sedation: A moderate level of sedation was  achieved with 2 mg IV  Versed and 150 mcg IV fentanyl.  Sedation time: 53 minutes  Please note the above medications were administered by the  interventional radiology staff under my direct supervision. Patient's  vital signs were monitored and remained stable throughout the  procedure.  Fluoroscopic time: 7.9 minutes  Air kerma: 31.5 mGy  Contrast: 85 mL of Isovue administered intravenously and  intra-arterially without complication.  Local anesthetic: 3 mL of 1% lidocaine.    FINDINGS: A total of 38 spot fluoroscopic images and fistulogram  sequences were obtained throughout the procedure. Mild stenosis at the  AV anastomosis treated with 6 mm balloon angioplasty with good result.  There is outflow vein obstruction of the fistulized cephalic vein. The  obstructed vein was accessed and found to be atretic. Two  pseudoaneurysms are noted arising from the fistula with associated  extrinsic compression and stenosis. These were treated to 7 mm balloon  diameter with good result. Pseudoaneurysms remain patent at  completion.      Impression    IMPRESSION:  1. Fistulogram in left upper extremity with improved inflow with 5 and  6 mm balloon angioplasty of the AV anastomosis.  2. Two areas of moderate stenosis in the outflow vein associated with  adjacent pseudoaneurysms, likely related to prior punctures. These  pseudoaneurysms remain patent and likely causing extrinsic compression  on adjacent fistula. After 7 mm balloon angioplasty, improved result.  3. Chronic occlusion of the outflow vein. Outflow of the fistula is  dependent upon numerous collaterals.    ABEBA SIMONS MD         SYSTEM ID:  [id number]    Upper Extremity Venous Duplex Left    Narrative    US UPPER EXTREMITY VENOUS DUPLEX LEFT  7/13/2023 2:36 PM     HISTORY: Evaluate for AVF revision.     COMPARISON: None    ______ TECHNIQUE: Color Doppler and spectral waveform analysis  obtained throughout the inflow brachial artery as well as outflow  cephalic  vein. Also, measurements obtained of the basilic vein.    Inflow brachial artery is patent. Diameters of the patent segments of  the fistulized basilic vein range from 5.2-8.2 mm. Two pseudoaneurysms  are identified arising from the fistula, the larger of which measures  approximately 2 x 4 cm and the smaller of which is 1.5 x 1.9 cm. The  pseudoaneurysms are patent. Two side branches are noted arising from  the fistulized cephalic vein, one of which is 1.5 mm and 436/255  cm/sec and the other is 2.7 mm and 203/68 cm/second. The cephalic vein  in the outflow segment is occluded.    The basilic vein ranges from 2.4-5.9 mm throughout the upper arm.  Multiple side branches are noted.      Impression    IMPRESSION:  1. Two pseudoaneurysms arising from the left upper arm fistula, the  largest of which is 4 cm and the smaller of which is 2 cm.  2. Basilic vein measurements in the upper arm range from 2.4 to 5.9  mm. Two side branches noted.  3. The outflow vein segment of the fistulized basilic vein is  occluded, chronically.    ABEBA SIMONS MD         SYSTEM ID:  [id number]     *Note: Due to a large number of results and/or encounters for the requested time period, some results have not been displayed. A complete set of results can be found in Results Review.              Medications Prior to Admission:     No medications prior to admission.              Discharge Medications:     Discharge Medication List as of 8/16/2023  9:48 AM        START taking these medications    Details   acetaminophen (TYLENOL) 325 MG tablet Take 2 tablets (650 mg) by mouth every 4 hours as needed for other (For optimal non-opioid multimodal pain management to improve pain control.), OTC      oxyCODONE (ROXICODONE) 5 MG tablet Take 1 tablet (5 mg) by mouth every 4 hours as needed for moderate pain, Disp-6 tablet, R-0, E-Prescribe           CONTINUE these medications which have NOT CHANGED    Details   apixaban ANTICOAGULANT (ELIQUIS) 5 MG  tablet Take 1 tablet (5 mg) by mouth 2 times daily for 90 days, Disp-180 tablet, R-1, E-Prescribe      hydrOXYzine (ATARAX) 25 MG tablet Take 1 tablet (25 mg) by mouth 3 times daily as needed for itching, Disp-90 tablet, R-0, E-Prescribe      Lacosamide (VIMPAT) 100 MG TABS tablet Take 1 tablet (100 mg) by mouth every 12 hours, Disp-60 tablet, R-2, E-Prescribe      melatonin 3 MG tablet Take 1 tablet (3 mg) by mouth At Bedtime, Disp-90 tablet, R-1, E-Prescribe      midodrine (PROAMATINE) 10 MG tablet Take 1 tab 3xDay, during patient's dialysis days , Monday, Wednesday, Friday, patient takes 2 extra Midodrine one hour before dialysis, takes 1 more Midodrine when he gets there, and takes 1 more tablet during dialysis, this is an addition to patient's s cheduled doses, Disp-150 tablet, R-1, E-Prescribe      multivitamin RENAL (TRIPHROCAPS) 1 capsule capsule Take 1 capsule by mouth daily, Disp-30 capsule, R-1, E-Prescribe      pantoprazole (PROTONIX) 40 MG EC tablet Take 1 tablet (40 mg) by mouth every morning (before breakfast), Disp-30 tablet, R-1, E-Prescribe      rOPINIRole (REQUIP) 0.5 MG tablet Take 1 tablet (0.5 mg) by mouth 2 times daily, Disp-60 tablet, R-2, E-Prescribe      sevelamer carbonate (RENVELA) 800 MG tablet Take 1,600 mg by mouth 3 times daily (with meals), Historical      tacrolimus (GENERIC EQUIVALENT) 1 MG capsule Take 1 capsule (1 mg) by mouth every 12 hours, Disp-60 capsule, R-1, E-Prescribe      traZODone (DESYREL) 50 MG tablet Take 0.5-1 tablets (25-50 mg) by mouth nightly as needed for sleep, Disp-90 tablet, R-1, E-Prescribe                     Day of Discharge Physical Exam:   Temp:  [97.2  F (36.2  C)-98.3  F (36.8  C)] 97.7  F (36.5  C)  Pulse:  [64-70] 67  Resp:  [12-19] 17  BP: ()/(46-72) 109/62  SpO2:  [94 %-100 %] 95 %    Physical Exam: Alert and appropriate.  Very comfortable.                          Dressings removed.Wd=A no swelling or ecchymosis                          +3  pulses revised AVF                          Minimal LAZARUS output--removed                             Redressed with Kerlix.           Discharge Instructions and Follow-Up:     Discharge Procedure Orders   Follow-up and recommended labs and tests    Order Comments: Follow up with me,  Deejay Mcneil MD, within 2-3 weeks. to evaluate after surgery.  No follow up labs or test are needed.     Activity   Order Comments: Your activity upon discharge: activity as tolerated.     Order Specific Question Answer Comments   Is discharge order? Yes      When to contact your care team   Order Comments: Call your primary doctor if you have any concerns.  Start Eliquis this afternoon at home     Diet   Order Comments: Follow this diet upon discharge: Advance to a regular diet as tolerated     Order Specific Question Answer Comments   Is discharge order? Yes              Discharge Disposition:     Discharged to home      Condition at discharge: Stable    Patient was discussed with staff, Dr. Mcneil, on the day of discharge.    Alban Carvajal MD    STAFF:  As above.  Should be able to use AVF in about 4 weeks.     Deejay Mcneil MD

## 2023-08-16 NOTE — CONSULTS
Patient discharging today. Today is his normal dialysis day. He will discharge and go to his outpatient dialysis unit for scheduled dialysis.     Discussed with vascular surgery team. Consult order completed.

## 2023-08-16 NOTE — PLAN OF CARE
Goal Outcome Evaluation:      Plan of Care Reviewed With: patient, spouse      Date & Time:8/15/2023 1923  Surgery/POD#: POD#0   1.  Repair of left upper arm brachial to cephalic arteriovenous fistula pseudoaneurysms x2.  2.  Outflow revision of proximal cephalic vein to brachial vein.  A. Mobilization of arteriovenous fistula with ligation of side branches.     Behavior & Aggression: calm and cooperative  Fall Risk: unknown, pt has not been out of bed yet  Orientation:A/O x 4  ABNL VS/O2:VSS, Pt is on RA, O2 sats 96 %.  Pt refused capnography post op.  ABNL Labs: Cr+ elevated, pt is a dialysis pt and is do for a dialysis run 8/16/2023  Pain Management:Pt c/o (L) LE pain s/p procedure, Oxycodone administered per MD orders, some relief noted.  Bowel/Bladder: Pt states he does not void, hemodialysis pt.  No BM since arrival to the unit.  Drains: J.P drain to the pt's (L) UE, 40cc of sanguinous drainage noted.  Diet:low sat diet, pt ate 100% of his dinner  Activity Level: bedrest  Tests/Procedures: see above procedure  Anticipated  DC Date: tomorrow.  Significant Information:   Pt states that his dialysis time tomorrow is at 7am, so he will miss it.  He is concerned about getting a demerit for not showing up in regards to the transplant list.  May have to do dialysis here.  JAMIE

## 2023-08-17 ENCOUNTER — HOSPITAL ENCOUNTER (OUTPATIENT)
Dept: CARDIOLOGY | Facility: CLINIC | Age: 58
Discharge: HOME | End: 2023-08-17
Attending: INTERNAL MEDICINE
Payer: COMMERCIAL

## 2023-08-17 ENCOUNTER — TELEPHONE (OUTPATIENT)
Dept: CARDIOLOGY | Facility: CLINIC | Age: 58
End: 2023-08-17
Payer: COMMERCIAL

## 2023-08-17 DIAGNOSIS — I46.9 PEA (PULSELESS ELECTRICAL ACTIVITY) (H): ICD-10-CM

## 2023-08-17 DIAGNOSIS — I31.39 PERICARDIAL EFFUSION: Primary | ICD-10-CM

## 2023-08-17 DIAGNOSIS — I48.0 PAF (PAROXYSMAL ATRIAL FIBRILLATION) (H): ICD-10-CM

## 2023-08-17 LAB — LVEF ECHO: NORMAL

## 2023-08-17 PROCEDURE — 93306 TTE W/DOPPLER COMPLETE: CPT | Mod: 26 | Performed by: INTERNAL MEDICINE

## 2023-08-17 PROCEDURE — 93306 TTE W/DOPPLER COMPLETE: CPT

## 2023-08-17 PROCEDURE — 93248 EXT ECG>7D<15D REV&INTERPJ: CPT | Performed by: INTERNAL MEDICINE

## 2023-08-17 PROCEDURE — 93246 EXT ECG>7D<15D RECORDING: CPT

## 2023-08-17 NOTE — TELEPHONE ENCOUNTER
Echo 8/17/2023 noted. To be reviewed at OV 9/29/2023 with HARRY Brie Ocasio.  Will message Dr. Abreu for review prior to the visit.    Ziopatch started same day  Patient was discharged 8/16/2023 after 1 day admit for fistula repair    Echo:  Moderate mostly posterior pericardial effusion.  There are no echocardiographic indications of cardiac tamponade.  Dilated main PA 3.5cm  Left ventricular systolic function is normal.  The right ventricle is normal in structure, function and size.  Mild valvular aortic stenosis.  In direct comparison to previous study, effusion has increased in size( 0.7-0.8 to 1.4-1.7cm)      Per Dr. Abreu's dictation 5/4/2023:  Patient's primary issues are non-cardiac.  Not surprisingly, due to his prolonged complex hospitalization he had episodes of paroxysmal atrial fibrillation.  Currently in sinus rhythm.  Last echocardiogram shows preserved ventricular function, no significant valve disease.  His small pericardial effusion is likely due to the chronic liver disease and is not of clinical significance.    Will message Dr. Abreu to review

## 2023-08-21 NOTE — TELEPHONE ENCOUNTER
Kristina Abreu MD Anderson, Barbara E, RN  Cc: RENETTA Becerril [facility] 2  Caller: Unspecified (4 days ago,  1:10 PM)    1. The patient was just discharged from the hospital, so he must be stable.  2.  Please call the patient.  If he has worsening shortness of breath or dizziness or decline in his overall health, he should go to the emergency room.  3.  He has postop follow-up with his vascular team that is coming up where he can be assessed.  4.  Repeat echocardiogram in 1 week as there are no concerning signs of tamponade.  5.  Please see if HARRY visit can be moved to an earlier date.    Thanks.  Dr. Abreu      Spoke to patient's wife, Ofe (consent on file), says patient is currently at dialysis. He had some issues with hypotension after his surgery due to blood loss. She notes he is not necessarily dizzy or short of breath, though he has been very tired since his surgery and is also taking oxycodone for pain which makes him more lethargic per Ofe. Reviewed ED precautions but will plan to repeat echo in 1wk and try to move up HARRY follow up. Order placed. Scheduling messaged to arrange.

## 2023-08-29 ENCOUNTER — OFFICE VISIT (OUTPATIENT)
Dept: OTHER | Facility: CLINIC | Age: 58
End: 2023-08-29
Payer: COMMERCIAL

## 2023-08-29 ENCOUNTER — HOSPITAL ENCOUNTER (OUTPATIENT)
Dept: CARDIOLOGY | Facility: CLINIC | Age: 58
Discharge: HOME | End: 2023-08-29
Attending: INTERNAL MEDICINE
Payer: COMMERCIAL

## 2023-08-29 VITALS — DIASTOLIC BLOOD PRESSURE: 60 MMHG | SYSTOLIC BLOOD PRESSURE: 91 MMHG | HEART RATE: 67 BPM

## 2023-08-29 DIAGNOSIS — Z99.2 ESRD (END STAGE RENAL DISEASE) ON DIALYSIS (H): Primary | ICD-10-CM

## 2023-08-29 DIAGNOSIS — T82.898A OTHER SPECIFIED COMPLICATION OF VASCULAR PROSTHETIC DEVICES, IMPLANTS AND GRAFTS, INITIAL ENCOUNTER (H): ICD-10-CM

## 2023-08-29 DIAGNOSIS — I31.39 PERICARDIAL EFFUSION: ICD-10-CM

## 2023-08-29 DIAGNOSIS — N18.6 ESRD (END STAGE RENAL DISEASE) ON DIALYSIS (H): Primary | ICD-10-CM

## 2023-08-29 LAB — LVEF ECHO: NORMAL

## 2023-08-29 PROCEDURE — 93325 DOPPLER ECHO COLOR FLOW MAPG: CPT | Mod: 26 | Performed by: INTERNAL MEDICINE

## 2023-08-29 PROCEDURE — 93325 DOPPLER ECHO COLOR FLOW MAPG: CPT

## 2023-08-29 PROCEDURE — 93308 TTE F-UP OR LMTD: CPT | Mod: 26 | Performed by: INTERNAL MEDICINE

## 2023-08-29 PROCEDURE — 99214 OFFICE O/P EST MOD 30 MIN: CPT

## 2023-08-29 PROCEDURE — G0463 HOSPITAL OUTPT CLINIC VISIT: HCPCS | Mod: 25

## 2023-08-29 PROCEDURE — 93321 DOPPLER ECHO F-UP/LMTD STD: CPT

## 2023-08-29 PROCEDURE — 93321 DOPPLER ECHO F-UP/LMTD STD: CPT | Mod: 26 | Performed by: INTERNAL MEDICINE

## 2023-08-29 NOTE — PROGRESS NOTES
VASCULAR SURGERY PROGRESS NOTE    LOCATION: [facility]     Blanco Osborne  Medical Record #:  [id number]  YOB: 1965  Age:  58 year old     Date of Service: 8/29/2023    PRIMARY CARE PROVIDER: Ki Carter    Reason for visit:  post-op    IMPRESSION:  Blanco Osborne is a 58 year old male who underwent a repair of left upper arm brachial to cephalic arteriovenous fistula pseudoaneurysms x2, outflow revision of proximal cephalic vein to brachial vein, and mobilization of arteriovenous fistula with ligation of side branches on 8/15.     On examination Blanco is doing well. Alert and oriented x4  Palpable thrill. 2+ radial pulses  Incision is fully healed.    RECOMMENDATION/RISKS: He dialyzes via right internal jugular catheter on MWF. He can start vein building exercises, and follow-up in 2-4 weeks with Dr. Mcneil and duplex of his AVF. Continue all medications.     REVIEW OF SYSTEMS:    A 12 point ROS was reviewed and is negative except for what is listed above in HPI.    PHH:    Past Medical History:   Diagnosis Date    Acquired immunocompromised state (H) 11/19/2022    Acute renal failure, unspecified acute renal failure type (H) 11/12/2022    Antiplatelet or antithrombotic long-term use     Arrhythmia     PEA    Ascites     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    Cancer (H) 2023    Squamous Cell Cancer- Since resolved    Cirrhosis of liver with ascites (H) 02/11/2016    Coagulopathy (H)     Critical illness myopathy     Dialysis patient (H)     DIALYSIS 3X/ WEEK    Encephalopathy     ESRD (end stage renal disease) on dialysis (H)     Gastroesophageal reflux disease     H/O alcohol abuse     History of blood transfusion     Hyperammonemia (H)     Hyperlipidemia     Hypertension     Patients states that HTN has been resolved    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    Insomnia     Lactic acidosis 11/12/2022    Liver replaced by transplant (H) 09/20/2016    NAFLD  (nonalcoholic fatty liver disease) 02/11/2016    CHRISTIAN on CPAP     Parainfluenza type 1 infection 11/19/2022    Parapneumonic effusion     Pleural effusion     Pneumothorax on left 12/16/2022    Respiratory acidosis 11/12/2022    Respiratory arrest (H) 11/12/2022    Restless legs syndrome (RLS)     SBP (spontaneous bacterial peritonitis) (H)     MNGI    Seizures (H) 12/2022    Sepsis due to Streptococcus pneumoniae with acute hypoxic respiratory failure (H) 11/19/2022    Stroke, embolic (H) 11/20/2022    Sudden cardiac arrest (H) 12/29/2022    PEA- 2 min of CPR    Tubular adenoma 10/2019    Large cecal adenoma- due for surveillance colonoscopy in 3 years (10/2022)          Past Surgical History:   Procedure Laterality Date    APPENDECTOMY      BENCH LIVER N/A 09/20/2016    Procedure: BENCH LIVER;  Surgeon: Enoc Crews MD;  Location: [facility] OR    BRONCHOSCOPY FLEXIBLE AND RIGID N/A 12/20/2022    Procedure: BRONCHOSCOPY;  Surgeon: Alena Valenzuela MD;  Location:  GI    COLONOSCOPY  08/06/2013    repeat in 2018    COLONOSCOPY N/A 10/04/2019    Procedure: COLONOSCOPY, WITH POLYPECTOMY AND BIOPSY;  Surgeon: Go Chong MD;  Location:  OR    CREATE FISTULA ARTERIOVENOUS UPPER EXTREMITY Left 03/21/2023    Procedure: LEFT UPPER EXTREMITY ARTERIOVENOUS FISTULA CREATION. LIGATION OF COMPETING VASCULAR BRANCHES- LEFT;  Surgeon: Deejay Mcneil MD;  Location:  OR    HERNIA REPAIR      IR CHEST TUBE PLACEMENT NON-TUNNELED RIGHT  12/12/2022    IR CVC TUNNEL PLACEMENT > 5 YRS OF AGE  12/06/2022    IR DIALYSIS FISTULOGRAM LEFT  07/13/2023    IR GASTROSTOMY TUBE CHANGE  02/08/2023    IR GASTROSTOMY TUBE PERCUTANEOUS PLCMNT  11/29/2022    IR PARACENTESIS  11/29/2022    IR PARACENTESIS  12/01/2022    IR THORACENTESIS  12/06/2022    IR THORACENTESIS  09/01/2020    IR THORACENTESIS  08/10/2020    IR TRANSCATHETER BIOPSY  12/01/2022    REVISION FISTULA ARTERIOVENOUS UPPER EXTREMITY Left 8/15/2023     Procedure: 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 2; OUTFLOW REVISION CEPHALIC TO BRACHIAL VEIN;  Surgeon: Deejay Mcneil MD;  Location: [facility] OR    TRACHEOSTOMY N/A 2022    Procedure: TRACHEOSTOMY;  Surgeon: Nilesh Jackson MD;  Location: [facility] OR    TRANSPLANT LIVER RECIPIENT  DONOR N/A 2016    Procedure: TRANSPLANT LIVER RECIPIENT  DONOR;  Surgeon: Enoc Crews MD;  Location: [facility] OR       ALLERGIES:  Patient has no known allergies.    MEDS:    Current Outpatient Medications:     acetaminophen (TYLENOL) 325 MG tablet, Take 2 tablets (650 mg) by mouth every 4 hours as needed for other (For optimal non-opioid multimodal pain management to improve pain control.), Disp: , Rfl:     apixaban ANTICOAGULANT (ELIQUIS) 5 MG tablet, Take 1 tablet (5 mg) by mouth 2 times daily for 90 days, Disp: 180 tablet, Rfl: 1    hydrOXYzine (ATARAX) 25 MG tablet, Take 1 tablet (25 mg) by mouth 3 times daily as needed for itching, Disp: 90 tablet, Rfl: 0    Lacosamide (VIMPAT) 100 MG TABS tablet, Take 1 tablet (100 mg) by mouth every 12 hours, Disp: 60 tablet, Rfl: 2    melatonin 3 MG tablet, Take 1 tablet (3 mg) by mouth At Bedtime, Disp: 90 tablet, Rfl: 1    midodrine (PROAMATINE) 10 MG tablet, Take 1 tab 3xDay, during patient's dialysis days , Monday, Wednesday, Friday, patient takes 2 extra Midodrine one hour before dialysis, takes 1 more Midodrine when he gets there, and takes 1 more tablet during dialysis, this is an addition to patient's scheduled doses, Disp: 150 tablet, Rfl: 1    multivitamin RENAL (TRIPHROCAPS) 1 capsule capsule, Take 1 capsule by mouth daily, Disp: 30 capsule, Rfl: 1    oxyCODONE (ROXICODONE) 5 MG tablet, Take 1 tablet (5 mg) by mouth every 4 hours as needed for moderate pain, Disp: 6 tablet, Rfl: 0    pantoprazole (PROTONIX) 40 MG EC tablet, Take 1 tablet (40 mg) by mouth every morning (before breakfast), Disp: 30 tablet, Rfl: 1    rOPINIRole (REQUIP) 0.5 MG  tablet, Take 1 tablet (0.5 mg) by mouth 2 times daily, Disp: 60 tablet, Rfl: 2    sevelamer carbonate (RENVELA) 800 MG tablet, Take 1,600 mg by mouth 3 times daily (with meals), Disp: , Rfl:     tacrolimus (GENERIC EQUIVALENT) 1 MG capsule, Take 1 capsule (1 mg) by mouth every 12 hours, Disp: 60 capsule, Rfl: 1    traZODone (DESYREL) 50 MG tablet, Take 0.5-1 tablets (25-50 mg) by mouth nightly as needed for sleep, Disp: 90 tablet, Rfl: 1    SOCIAL HABITS:    History   Smoking Status    Never   Smokeless Tobacco    Never     Social History    Substance and Sexual Activity      Alcohol use: Not Currently        Alcohol/week: 0.0 standard drinks of alcohol      History   Drug Use No       FAMILY HISTORY:    Family History   Problem Relation Age of Onset    Coronary Artery Disease No family hx of     Cardiomyopathy No family hx of        PE:  BP 91/60 (BP Location: Right arm, Patient Position: Chair, Cuff Size: Adult Regular)   Pulse 67   Wt Readings from Last 1 Encounters:   23 194 lb 8 oz (88.2 kg)     There is no height or weight on file to calculate BMI.    EXAM:  GENERAL: well-developed 58 year old male who appears his stated age  CARDIAC: normal   CHEST/LUNG: normal respiratory effort   MUSCULOSKELETAL: grossly normal and both lower extremities are intact, no lower extremity edema  NEUROLOGIC: focally intact, alert and oriented x 3  PSYCH: appropriate affect  VASCULAR:       DIAGNOSTIC STUDIES:     Images:  Echocardiogram Limited    Result Date: 2023  [id number]^ROMELIA^MANUEL^AHMET  [facility] [address] [place] [address] Phone [phone] Fax [phone]  Name: MARY JO CRAIN MRN: [id number] : 1965 Study Date: 2023 07:52 AM Age: 58 yrs Gender: Male Patient Location: [facility] Reason For Study: Pericardial effusion Ordering Physician: MANUEL LEÓN  Referring Physician: Ki Carter Performed By: Kyle Angelo RDCS  BSA: 2.1 m2 Height: 72 in Weight: 194 lb HR: 50 BP: 88/58 mmHg ______________________________________________________________________________ Procedure Limited Echo Adult.  ______________________________________________________________________________ Interpretation Summary  Left ventricular systolic function is normal. The visual ejection fraction is 55-60%. The right ventricle is normal in structure, function and size. The aortic valve is trileaflet with aortic valve sclerosis. There is trace to mild aortic regurgitation.  Moderate pericardial effusion localized primarily posteriorly, measuring up to 1.7 cm around the base. No echocardiographic findings suggesting tamponade physiology. No significant respiratory variation in inflow velocities across the mitral valve, tricuspid valve. No chamber collapse. IVC is normal in size with preserved response. Compared to prior study 8/17/2023, the effusion is similar in size posteriorly, however appears smaller around the apex than on the prior study. ______________________________________________________________________________ Left Ventricle Left ventricular systolic function is normal. The visual ejection fraction is 55-60%.  Right Ventricle The right ventricle is normal in structure, function and size.  Mitral Valve There is moderate mitral annular calcification. There is trace mitral regurgitation.  Tricuspid Valve There is trace tricuspid regurgitation. The right ventricular systolic pressure is approximated at 28mmHg plus the right atrial pressure.  Aortic Valve The aortic valve is trileaflet with aortic valve sclerosis. There is trace to mild aortic regurgitation.  Pulmonic Valve The pulmonic valve is not well seen, but is grossly normal.  Vessels The inferior vena cava was normal in size with preserved respiratory variability.  Pericardium Moderate pericardial effusion localized primarily  posteriorly, measuring up to 1.7 cm around the base. No echocardiographic findings suggesting tamponade physiology. No significant respiratory variation in inflow velocities across the mitral valve, tricuspid valve. No chamber collapse. IVC is normal in size with preserved response. Compared to prior study 2023, the effusion is similar in size posteriorly, however appears smaller around the apex than on the prior study. ______________________________________________________________________________ Report approved by: Tereso Clark 2023 10:19 AM  ______________________________________________________________________________      Echocardiogram Complete    Result Date: 2023  [id number]^ROMELIA^MANUEL^AHMET  [facility] Echocardiography [facility] [address] [place] [address] [address] [place] [address] Phone [phone] Fax [phone]  Name: MARY JO CRAIN MRN: [id number] : 1965 Study Date: 2023 08:47 AM Age: 58 yrs Gender: Male Patient Location: [facility] Reason For Study: PEA (Pulseless electrical activity) (H), PAF (paroxysmal atrial Ordering Physician: MANUEL LEÓN Referring Physician: RIO ROBLERO Performed By: Christel Jones  BSA: 2.1 m2 Height: 72 in Weight: 194 lb HR: 59 BP: 97/59 mmHg ______________________________________________________________________________ Procedure Complete Echo Adult.  ______________________________________________________________________________ Interpretation Summary  Moderate mostly posterior pericardial effusion. There are no echocardiographic indications of cardiac tamponade. Dilated main PA 3.5cm Left ventricular systolic function is normal. The right ventricle is normal in structure, function and size. Mild valvular aortic stenosis. In direct comparison to previous study, effusion has increased in size( 0.7- 0.8 to 1.4-1.7cm)  ______________________________________________________________________________ Left Ventricle The left ventricle is normal in size. Left ventricular systolic function is normal. The visual ejection fraction is 60-65%. Grade I or early diastolic dysfunction. No regional wall motion abnormalities noted.  Right Ventricle The right ventricle is normal in structure, function and size.  Atria The left atrium is borderline dilated.  Mitral Valve The mitral valve is normal in structure and function. There is mild to moderate mitral annular calcification. There is trace mitral regurgitation. There is no mitral valve stenosis.  Tricuspid Valve The tricuspid valve is not well visualized, but is grossly normal. The right ventricular systolic pressure is approximated at 21mmHg plus the right atrial pressure.  Aortic Valve The aortic valve is trileaflet with aortic valve sclerosis. The peak AoV pressure gradient is 24.0 mmHg. The mean AoV pressure gradient is 13.0 mmHg. The calculated aortic valve are is 2.6 cm^2. Mild valvular aortic stenosis.  Pulmonic Valve The pulmonic valve is not well seen, but is grossly normal. Normal pulmonic valve velocity.  Vessels Borderline aortic root dilatation. The ascending aorta is Mildly dilated. The inferior vena cava is normal. Dilated pulmonary arteries.  Pericardium Moderate posterior pericardial effusion. There are no echocardiographic indications of cardiac tamponade.  ______________________________________________________________________________ MMode/2D Measurements & Calculations IVSd: 0.96 cm LVIDd: 5.1 cm LVIDs: 3.3 cm LVPWd: 1.0 cm FS: 34.7 % LV mass(C)d: 186.2 grams LV mass(C)dI: 88.5 grams/m2 Ao root diam: 3.9 cm asc Aorta Diam: 4.1 cm LVOT diam: 2.3 cm LVOT area: 4.2 cm2 LA Volume (BP): 64.4 ml  LA Volume Index (BP): 30.7 ml/m2 RWT: 0.41  Doppler Measurements & Calculations MV E max brain: 71.8 cm/sec MV A max brain: 87.3 cm/sec MV E/A: 0.82 MV dec slope: 161.0 cm/sec2 MV dec time:  0.45 sec Ao V2 max: 246.0 cm/sec Ao max P.0 mmHg Ao V2 mean: 169.0 cm/sec Ao mean P.0 mmHg Ao V2 VTI: 54.3 cm WOLFGANG(I,D): 2.6 cm2 WOLFGANG(V,D): 2.3 cm2 AI P1/2t: 566.0 msec LV V1 max P.7 mmHg LV V1 max: 139.0 cm/sec LV V1 VTI: 33.5 cm SV(LVOT): 139.2 ml SI(LVOT): 66.2 ml/m2  PA V2 max: 168.0 cm/sec PA max P.3 mmHg PA acc time: 0.08 sec AV Baljeet Ratio (DI): 0.57 WOLFGANG Index (cm2/m2): 1.2 E/E' av.0 Lateral E/e': 6.9 Medial E/e': 9.0 RV S Baljeet: 15.4 cm/sec  ______________________________________________________________________________ Report approved by: Tereso Britt 2023 10:57 AM         LABS:      Sodium   Date Value Ref Range Status   08/10/2023 141 136 - 145 mmol/L Final   2023 139 136 - 145 mmol/L Final   2023 143 136 - 145 mmol/L Final   2020 139 133 - 144 mmol/L Final   10/07/2019 131 (L) 133 - 144 mmol/L Final   2019 137 133 - 144 mmol/L Final     Urea Nitrogen   Date Value Ref Range Status   08/10/2023 45.6 (H) 8.0 - 23.0 mg/dL Final   2023 37.2 (H) 8.0 - 23.0 mg/dL Final   2023 46.2 (H) 8.0 - 23.0 mg/dL Final   2022 46 (H) 7 - 30 mg/dL Final   2022 62 (H) 7 - 30 mg/dL Final   2022 52 (H) 7 - 30 mg/dL Final   2020 23 7 - 30 mg/dL Final   10/07/2019 18 7 - 30 mg/dL Final   2019 20 7 - 30 mg/dL Final     Hemoglobin   Date Value Ref Range Status   2023 8.5 (L) 13.3 - 17.7 g/dL Final   08/10/2023 9.1 (L) 13.3 - 17.7 g/dL Final   2023 8.7 (L) 13.3 - 17.7 g/dL Final   2020 14.3 13.3 - 17.7 g/dL Final   10/07/2019 13.7 13.3 - 17.7 g/dL Final   2019 13.7 13.3 - 17.7 g/dL Final     Platelet Count   Date Value Ref Range Status   2023 73 (L) 150 - 450 10e3/uL Final   08/10/2023 82 (L) 150 - 450 10e3/uL Final   2023 101 (L) 150 - 450 10e3/uL Final   2020 108 (L) 150 - 450 10e9/L Final   10/07/2019 194 150 - 450 10e9/L Final   2019 125 (L) 150 - 450 10e9/L Final     INR   Date Value  Ref Range Status   08/16/2023 1.29 (H) 0.85 - 1.15 Final   12/21/2022 1.36 (H) 0.85 - 1.15 Final   12/16/2022 1.14 0.85 - 1.15 Final   10/07/2019 1.20 (H) 0.86 - 1.14 Final   10/17/2017 1.26 (H) 0.86 - 1.14 Final   09/19/2017 1.15 (H) 0.86 - 1.14 Final       30 minutes spent on the day of encounter doing chart review, history and exam, documentation, and further activities as noted.       Saima Coker, NP  VASCULAR SURGERY

## 2023-08-29 NOTE — PROGRESS NOTES
[facility]        Patient is here for a  follow up.     Pt is currently taking Eliquis.    BP 91/60 (BP Location: Right arm, Patient Position: Chair, Cuff Size: Adult Regular)   Pulse 67     The provider has been notified that the patient has no concerns.     Questions patient would like addressed today are: N/A.    Refills are needed: N/A    Has homecare services and agency name:  Parul Valdez MA

## 2023-09-05 ENCOUNTER — OFFICE VISIT (OUTPATIENT)
Dept: NEUROLOGY | Facility: CLINIC | Age: 58
End: 2023-09-05
Payer: COMMERCIAL

## 2023-09-05 VITALS
TEMPERATURE: 97.3 F | SYSTOLIC BLOOD PRESSURE: 97 MMHG | BODY MASS INDEX: 25.6 KG/M2 | HEART RATE: 61 BPM | WEIGHT: 189 LBS | DIASTOLIC BLOOD PRESSURE: 62 MMHG | HEIGHT: 72 IN

## 2023-09-05 DIAGNOSIS — G40.109 FOCAL EPILEPSY (H): Primary | ICD-10-CM

## 2023-09-05 DIAGNOSIS — R56.9 SEIZURES (H): ICD-10-CM

## 2023-09-05 RX ORDER — LACOSAMIDE 50 MG/1
50 TABLET ORAL EVERY MORNING
Qty: 31 TABLET | Refills: 5 | Status: SHIPPED | OUTPATIENT
Start: 2023-09-05 | End: 2023-09-20

## 2023-09-05 RX ORDER — LACOSAMIDE 100 MG/1
100 TABLET ORAL EVERY EVENING
Qty: 31 TABLET | Refills: 5 | Status: SHIPPED | OUTPATIENT
Start: 2023-09-05 | End: 2024-04-30

## 2023-09-05 NOTE — LETTER
2023       RE: Blanco Osborne  : 1965   MRN: [id number]      Dear Colleague,    Thank you for referring your patient, Blanco Osborne, to the [facility] EPILEPSY CARE at [facility]. Please see a copy of my visit note below.    [facility] Epilepsy [facility] Progress Note      Patient:  Blanco Osborne  :  1965   Age:  58 year old   Today's Office Visit:  2023    History of Present Illness:     Mr. Blanco Osborne returns to the clinic for a follow up on his epilepsy.  He is accompanied by his wife, Ofe, who contributes to the history.  Mr. Osborne was not noted to have any seizures since last visit 2023.      During 4 days of video EEG monitoring he had 2 electroclinical seizures on day 1 and 2, characterized by right gaze deviation, right head turn, facial twitching and progression to shoulder twitches on the right side head jerks and whole body twitches.  EEG on the first seizure was during impedance check and on day 2, seizure had left occipital focal onset.     Mr. Osborne has been getting headaches in the past few months. They were happening couple times a week, usually on the days of dialysis.  It's usually in his forehead.  Usually 4-5 in intensity, feels pressure.  He denies dizziness, nausea or other associated symptoms.  He takes Tylenol and it improves.     Sometimes he feels jittery during dialysis. He attributes it to taking his lacosamide in the morning before dialysis. He also feels tired.   He is taking lacosamide 100 mg bid. Last lacosamide level 2023 was therapeutic--6.7.     Brain MRI 22, which I reviewed with Mr. Osborne and his wife showed scattered tiny ischemic infarcts in bilateral hemispheres and diffuse cerebral volume loss and cerebral white matter changes consistent with chronic small vessel ischemic disease.    Current Outpatient Medications   Medication Sig Dispense Refill      acetaminophen (TYLENOL) 325 MG tablet Take 2 tablets (650 mg) by mouth every 4 hours as needed for other (For optimal non-opioid multimodal pain management to improve pain control.)       apixaban ANTICOAGULANT (ELIQUIS) 5 MG tablet Take 1 tablet (5 mg) by mouth 2 times daily for 90 days 180 tablet 1     Lacosamide (VIMPAT) 100 MG TABS tablet Take 1 tablet (100 mg) by mouth every 12 hours 60 tablet 2     melatonin 3 MG tablet Take 1 tablet (3 mg) by mouth At Bedtime 90 tablet 1     midodrine (PROAMATINE) 10 MG tablet Take 1 tab 3xDay, during patient's dialysis days , Monday, Wednesday, Friday, patient takes 2 extra Midodrine one hour before dialysis, takes 1 more Midodrine when he gets there, and takes 1 more tablet during dialysis, this is an addition to patient's scheduled doses 150 tablet 1     multivitamin RENAL (TRIPHROCAPS) 1 capsule capsule Take 1 capsule by mouth daily 30 capsule 1     sevelamer carbonate (RENVELA) 800 MG tablet Take 1,600 mg by mouth 3 times daily (with meals)       tacrolimus (GENERIC EQUIVALENT) 1 MG capsule Take 1 capsule (1 mg) by mouth every 12 hours 60 capsule 1     hydrOXYzine (ATARAX) 25 MG tablet Take 1 tablet (25 mg) by mouth 3 times daily as needed for itching (Patient not taking: Reported on 9/5/2023) 90 tablet 0     oxyCODONE (ROXICODONE) 5 MG tablet Take 1 tablet (5 mg) by mouth every 4 hours as needed for moderate pain (Patient not taking: Reported on 9/5/2023) 6 tablet 0     pantoprazole (PROTONIX) 40 MG EC tablet Take 1 tablet (40 mg) by mouth every morning (before breakfast) (Patient not taking: Reported on 9/5/2023) 30 tablet 1     rOPINIRole (REQUIP) 0.5 MG tablet Take 1 tablet (0.5 mg) by mouth 2 times daily (Patient not taking: Reported on 9/5/2023) 60 tablet 2     traZODone (DESYREL) 50 MG tablet Take 0.5-1 tablets (25-50 mg) by mouth nightly as needed for sleep (Patient not taking: Reported on 9/5/2023) 90 tablet 1        Medication Notes:        AED Medication  Compliance:  compliant most of the time    Exam:    BP 97/62   Pulse 61   Temp 97.3  F (36.3  C) (Temporal)   Ht 1.829 m (6')   Wt 85.7 kg (189 lb)   BMI 25.63 kg/m       Wt Readings from Last 5 Encounters:   09/05/23 85.7 kg (189 lb)   08/16/23 88.2 kg (194 lb 8 oz)   08/10/23 88.2 kg (194 lb 8 oz)   07/13/23 83.9 kg (185 lb)   07/25/23 86.2 kg (190 lb)     General Appearance: Alert, awake, cooperative, pleasant, NAD  Gait: steady  Attention Span:  Normal  Language/speech: no aphasia or dysarthria  Extraocular Movements:  Normal  Coordination:  Normal finger to nose  Facial Sensation:  Normal  Facial Strength:  Normal  Tongue Strength:  Normal  Motor Exam: normal tone, bulk and strength 5/5 bilaterally    Assessment and Plan:  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  Ictal EEG onset was not clear with one due to impedance check during seizure onset, and was left occipital and the other one.  Patient's MRI showed remote right parietal stroke and acute left frontotemporal and right frontal small embolic strokes.  The patient was in critical condition in the hospital, with septic shock, renal failure and likely electrolyte abnormalities.  He never had seizures prior to this hospital admission and no seizures were reported since discharge from the hospital.   Levetiracetam was tapered to off since last visit.  He is taking lacosamide 100 mg bid.  He feels tired and feels jittery--he attributes to lacosamide.  His lacosamide level in in mid-range. I suggested to decrease the morning dose to 50 mg.      - Decrease lacosamide to 50 mg am and 100 mg pm.    - Follow up in 6 months.       As described above, I met with the patient and his family for 25 minutes and during this time counseling was greater than 50% of the visit time. I spent an additional 15 min on chart review and  documentation.   Reba Laughlin MD                        Again, thank you for allowing me to participate in the care of your patient.      Sincerely,    Reba Laughlin MD

## 2023-09-05 NOTE — PROGRESS NOTES
[facility]psy Care Progress Note      Patient:  Blanco Osborne  :  1965   Age:  58 year old   Today's Office Visit:  2023    History of Present Illness:     Mr. Blanco Osborne returns to the clinic for a follow up on his epilepsy.  He is accompanied by his wife, Ofe, who contributes to the history.  Mr. Osborne was not noted to have any seizures since last visit 2023.      During 4 days of video EEG monitoring he had 2 electroclinical seizures on day 1 and 2, characterized by right gaze deviation, right head turn, facial twitching and progression to shoulder twitches on the right side head jerks and whole body twitches.  EEG on the first seizure was during impedance check and on day 2, seizure had left occipital focal onset.     Mr. Osborne has been getting headaches in the past few months. They were happening couple times a week, usually on the days of dialysis.  It's usually in his forehead.  Usually 4-5 in intensity, feels pressure.  He denies dizziness, nausea or other associated symptoms.  He takes Tylenol and it improves.     Sometimes he feels jittery during dialysis. He attributes it to taking his lacosamide in the morning before dialysis. He also feels tired.   He is taking lacosamide 100 mg bid. Last lacosamide level 2023 was therapeutic--6.7.     Brain MRI 22, which I reviewed with Mr. Osborne and his wife showed scattered tiny ischemic infarcts in bilateral hemispheres and diffuse cerebral volume loss and cerebral white matter changes consistent with chronic small vessel ischemic disease.    Current Outpatient Medications   Medication Sig Dispense Refill    acetaminophen (TYLENOL) 325 MG tablet Take 2 tablets (650 mg) by mouth every 4 hours as needed for other (For optimal non-opioid multimodal pain management to improve pain control.)      apixaban ANTICOAGULANT (ELIQUIS) 5 MG tablet Take 1 tablet (5 mg) by mouth 2 times daily for 90 days 180 tablet 1     Lacosamide (VIMPAT) 100 MG TABS tablet Take 1 tablet (100 mg) by mouth every 12 hours 60 tablet 2    melatonin 3 MG tablet Take 1 tablet (3 mg) by mouth At Bedtime 90 tablet 1    midodrine (PROAMATINE) 10 MG tablet Take 1 tab 3xDay, during patient's dialysis days , Monday, Wednesday, Friday, patient takes 2 extra Midodrine one hour before dialysis, takes 1 more Midodrine when he gets there, and takes 1 more tablet during dialysis, this is an addition to patient's scheduled doses 150 tablet 1    multivitamin RENAL (TRIPHROCAPS) 1 capsule capsule Take 1 capsule by mouth daily 30 capsule 1    sevelamer carbonate (RENVELA) 800 MG tablet Take 1,600 mg by mouth 3 times daily (with meals)      tacrolimus (GENERIC EQUIVALENT) 1 MG capsule Take 1 capsule (1 mg) by mouth every 12 hours 60 capsule 1    hydrOXYzine (ATARAX) 25 MG tablet Take 1 tablet (25 mg) by mouth 3 times daily as needed for itching (Patient not taking: Reported on 9/5/2023) 90 tablet 0    oxyCODONE (ROXICODONE) 5 MG tablet Take 1 tablet (5 mg) by mouth every 4 hours as needed for moderate pain (Patient not taking: Reported on 9/5/2023) 6 tablet 0    pantoprazole (PROTONIX) 40 MG EC tablet Take 1 tablet (40 mg) by mouth every morning (before breakfast) (Patient not taking: Reported on 9/5/2023) 30 tablet 1    rOPINIRole (REQUIP) 0.5 MG tablet Take 1 tablet (0.5 mg) by mouth 2 times daily (Patient not taking: Reported on 9/5/2023) 60 tablet 2    traZODone (DESYREL) 50 MG tablet Take 0.5-1 tablets (25-50 mg) by mouth nightly as needed for sleep (Patient not taking: Reported on 9/5/2023) 90 tablet 1        Medication Notes:        AED Medication Compliance:  compliant most of the time    Exam:    BP 97/62   Pulse 61   Temp 97.3  F (36.3  C) (Temporal)   Ht 1.829 m (6')   Wt 85.7 kg (189 lb)   BMI 25.63 kg/m       Wt Readings from Last 5 Encounters:   09/05/23 85.7 kg (189 lb)   08/16/23 88.2 kg (194 lb 8 oz)   08/10/23 88.2 kg (194 lb 8 oz)   07/13/23  83.9 kg (185 lb)   07/25/23 86.2 kg (190 lb)     General Appearance: Alert, awake, cooperative, pleasant, NAD  Gait: steady  Attention Span:  Normal  Language/speech: no aphasia or dysarthria  Extraocular Movements:  Normal  Coordination:  Normal finger to nose  Facial Sensation:  Normal  Facial Strength:  Normal  Tongue Strength:  Normal  Motor Exam: normal tone, bulk and strength 5/5 bilaterally    Assessment and Plan:  Likely focal epilepsy: 2 focal to bilateral generalized tonic-clonic seizures were captured during video-EEG monitoring at [facility] which were identical semiologically.  Ictal EEG onset was not clear with one due to impedance check during seizure onset, and was left occipital and the other one.  Patient's MRI showed remote right parietal stroke and acute left frontotemporal and right frontal small embolic strokes.  The patient was in critical condition in the hospital, with septic shock, renal failure and likely electrolyte abnormalities.  He never had seizures prior to this hospital admission and no seizures were reported since discharge from the hospital.   Levetiracetam was tapered to off since last visit.  He is taking lacosamide 100 mg bid.  He feels tired and feels jittery--he attributes to lacosamide.  His lacosamide level in in mid-range. I suggested to decrease the morning dose to 50 mg.      - Decrease lacosamide to 50 mg am and 100 mg pm.    - Follow up in 6 months.       As described above, I met with the patient and his family for 25 minutes and during this time counseling was greater than 50% of the visit time. I spent an additional 15 min on chart review and documentation.   Reba Laughlin MD

## 2023-09-11 ENCOUNTER — OFFICE VISIT (OUTPATIENT)
Dept: CARDIOLOGY | Facility: CLINIC | Age: 58
End: 2023-09-11
Payer: COMMERCIAL

## 2023-09-11 VITALS
SYSTOLIC BLOOD PRESSURE: 95 MMHG | DIASTOLIC BLOOD PRESSURE: 58 MMHG | OXYGEN SATURATION: 98 % | HEIGHT: 72 IN | HEART RATE: 60 BPM | WEIGHT: 191 LBS | BODY MASS INDEX: 25.87 KG/M2

## 2023-09-11 DIAGNOSIS — G72.81 CRITICAL ILLNESS MYOPATHY: ICD-10-CM

## 2023-09-11 DIAGNOSIS — Z99.2 ESRD (END STAGE RENAL DISEASE) ON DIALYSIS (H): ICD-10-CM

## 2023-09-11 DIAGNOSIS — I31.39 PERICARDIAL EFFUSION: Primary | ICD-10-CM

## 2023-09-11 DIAGNOSIS — R56.9 SEIZURES (H): ICD-10-CM

## 2023-09-11 DIAGNOSIS — D84.9 IMMUNOSUPPRESSION (H): ICD-10-CM

## 2023-09-11 DIAGNOSIS — Z86.74 HISTORY OF SUDDEN CARDIAC ARREST: ICD-10-CM

## 2023-09-11 DIAGNOSIS — E78.5 DYSLIPIDEMIA: ICD-10-CM

## 2023-09-11 DIAGNOSIS — N18.6 ESRD (END STAGE RENAL DISEASE) ON DIALYSIS (H): ICD-10-CM

## 2023-09-11 DIAGNOSIS — G47.33 OSA ON CPAP: ICD-10-CM

## 2023-09-11 PROCEDURE — 99214 OFFICE O/P EST MOD 30 MIN: CPT | Performed by: NURSE PRACTITIONER

## 2023-09-11 NOTE — LETTER
9/11/2023    Ki Carter PA-C  [address]  [facility] [address]    RE: Blanco Osborne       Dear Colleague,     I had the pleasure of seeing Blanco Osborne in the [facility].              ~Cardiology Clinic Visit~    Primary Cardiologist: Dr. Abreu  Reason for visit: 4-month follow up    History of Present Illness  Blanco Osborne is a very pleasant 58 year old male with a past medical history notable for Patient is 57 years old with alcoholic cirrhosis status post liver transplant in 2016, type 2 diabetes, chronic immunosuppression, PAF, previous stroke, hypertension, seizure disorder, obstructive sleep apnea on BiPAP following respiratory arrest in November 2022 which ended with end-stage renal disease needing dialysis.       He has been referred to cardiology following recent hospitalization where he was noted to have paroxysmal atrial fibrillation and appropriately started on anticoagulation for stroke prophylaxis, and consulted with Dr. Abreu on 5/4/23.  In the past, he has been seen by Dr. Morales at [facility] cardiology in 2016.     He was hospitalized for 3-months from 1/21/23 - 4/28/23.  He was recovering in long-term care at [place] and transferred to [facility] on 1/21/2023 for evaluation of loculated pleural effusion.  During this he had acute metabolic encephalopathy with delirium, chronic hepatic encephalopathy, bilateral pleural effusions and acute respiratory failure requiring tracheostomy, ARDS, pneumonia.  He had multispecialty consultation including thoracic surgery, infectious diseases, pulmonology, neurology.  He was treated with antifungal therapy.  Had subacute bacterial peritonitis that resolved.     During all of this he had episodes of paroxysmal atrial fibrillation. But he is chronically anticoagulated on apixaban and currently in sinus rhythm at 65 bpm. He cannot tolerate rate controlling medication such as beta-blocker due to low resting  blood pressure in the 100s systolic.     Echocardiogram in March 2023 showed normal LVEF, no significant valve disease, no obvious vegetations, but with note of a small posterior pericardial effusion without tamponade.  He had a follow up echocardiogram 4-months later.      TTE on 8/17/23 showed moderate, mostly posterior pericardial effusion without signs of tamponade.  Effusion increased in size up to 1.4-1.7 cm.  EF remains stable at 60-65%.    We repeated the study again, and TTE on 8/29/2023 showed similar moderate pericardial effusion localized posteriorly measuring up to 1.7 cm around the base.  There were no signs of tamponade.  IVC is normal in size with preserved response.  Compared to prior study, effusion is similar in size posteriorly, however, appears smaller around the apex.  EF remains stable at 55-60%.    Interval 9/11/23  Patient has no new cardiac concerns to discuss today.  He is fatigued from dialysis earlier this morning.  He denies SOB, chest pain or pressure, palpitations, orthopnea, PND.  He is a little lightheaded after dialysis, and they removed 2.5L fluid.  He has had no falls or syncope.  __________________________________________________________________         Assessment and Impression:     1.  Paroxysmal atrial fibrillation.  Currently in sinus rhythm.  Appropriately anticoagulated with apixaban.  Not a candidate for AV slowing drugs due to resting hypotension.  2.  Complex alcoholic liver cirrhosis, status post liver transplant in 2016, chronically immunosuppressed.  3.  End-stage renal disease, dialysis via PCAD on MWF, new AVF.  4.  Type 2 diabetes mellitus.  5.  Significant physical debility secondary to recent prolonged 3-month hospitalization for respiratory failure.  6. Pericardial effusion on echocardiogram; moderate and stable on last imaging.         Recommendations and Plan:     Repeat limited echocardiogram this week.  Counseled on symptoms of worsening effusion and when  to seek emergent care.  Follow up in 1-month with HARRY to discuss testing results.  We will also discuss the results of the heart monitor, which unfortunately was still in process at the time of our visit today.  __________________________________________________________________    Thank you for the opportunity to participate in this pleasant patient's care.    We would be happy to see this patient sooner for any concerns in the meantime.    STEFFANY Vizcarra, CNP   Nurse Practitioner  [facility]    Today's clinic visit entailed:  Review of the result(s) of each unique test - cardiac testing, multiple echocardiograms, hospitalization records, care everywhere records, labs  The following tests were independently interpreted by me as noted in my documentation: echocardiogram  Ordering of each unique test  Prescription drug management    The level of medical decision making during this visit was of moderate complexity.    Orders this Visit:  Orders Placed This Encounter   Procedures    Follow-Up with Cardiology- HARRY    Echocardiogram Limited     No orders of the defined types were placed in this encounter.    There are no discontinued medications.  Encounter Diagnoses   Name Primary?    Pericardial effusion Yes    ESRD (end stage renal disease) on dialysis (H)     CHRISTIAN on CPAP     Dyslipidemia     History of sudden cardiac arrest, PEA     Critical illness myopathy     Immunosuppression (H)     Seizures (H)      CURRENT MEDICATIONS:  Current Outpatient Medications   Medication Sig Dispense Refill    acetaminophen (TYLENOL) 325 MG tablet Take 2 tablets (650 mg) by mouth every 4 hours as needed for other (For optimal non-opioid multimodal pain management to improve pain control.)      apixaban ANTICOAGULANT (ELIQUIS) 5 MG tablet Take 1 tablet (5 mg) by mouth 2 times daily for 90 days 180 tablet 1    hydrOXYzine (ATARAX) 25 MG tablet Take 1 tablet (25 mg) by mouth 3 times daily as needed for  itching 90 tablet 0    Lacosamide (VIMPAT) 100 MG TABS tablet Take 1 tablet (100 mg) by mouth every evening (Patient taking differently: Take 75 mg by mouth daily 25mg in the morning, 50mg in the evening) 31 tablet 5    melatonin 3 MG tablet Take 1 tablet (3 mg) by mouth At Bedtime 90 tablet 1    midodrine (PROAMATINE) 10 MG tablet Take 1 tab 3xDay, during patient's dialysis days , Monday, Wednesday, Friday, patient takes 2 extra Midodrine one hour before dialysis, takes 1 more Midodrine when he gets there, and takes 1 more tablet during dialysis, this is an addition to patient's scheduled doses 150 tablet 1    multivitamin RENAL (TRIPHROCAPS) 1 capsule capsule Take 1 capsule by mouth daily 30 capsule 1    sevelamer carbonate (RENVELA) 800 MG tablet Take 1,600 mg by mouth 3 times daily (with meals)      tacrolimus (GENERIC EQUIVALENT) 1 MG capsule Take 1 capsule (1 mg) by mouth every 12 hours 60 capsule 1    lacosamide (VIMPAT) 50 MG TABS tablet Take 1 tablet (50 mg) by mouth every morning (Patient not taking: Reported on 9/11/2023) 31 tablet 5    oxyCODONE (ROXICODONE) 5 MG tablet Take 1 tablet (5 mg) by mouth every 4 hours as needed for moderate pain (Patient not taking: Reported on 9/5/2023) 6 tablet 0    pantoprazole (PROTONIX) 40 MG EC tablet Take 1 tablet (40 mg) by mouth every morning (before breakfast) (Patient not taking: Reported on 9/5/2023) 30 tablet 1    rOPINIRole (REQUIP) 0.5 MG tablet Take 1 tablet (0.5 mg) by mouth 2 times daily (Patient not taking: Reported on 9/5/2023) 60 tablet 2    traZODone (DESYREL) 50 MG tablet Take 0.5-1 tablets (25-50 mg) by mouth nightly as needed for sleep (Patient not taking: Reported on 9/5/2023) 90 tablet 1     ALLERGIES   No Known Allergies  PAST MEDICAL, SURGICAL, FAMILY, SOCIAL HISTORY:  History was reviewed and updated as needed, see medical record.    Review of Systems:  A 10-point Review Of Systems is otherwise normal except for that which is noted in the HPI and  interval summary.    Physical Exam:    Vitals: BP 95/58   Pulse 60   Ht 1.829 m (6')   Wt 86.6 kg (191 lb)   SpO2 98%   BMI 25.90 kg/m    Constitutional: Appears stated age, well nourished, NAD.  Neck: Supple. Carotid pulses full and equal.   Respiratory: Non-labored. Lungs CTAB.  Cardiovascular: RRR, normal S1 and S2. No M/G/R.  No edema.  Musculoskeletal/Extremities: LUE AVF, positive thrill/bruit.  Neurologic: No gross focal deficits. Alert, awake, and oriented to all spheres.  Psychiatric: Affect appropriate. Mentation normal.    Recent Lab Results:  LIPID RESULTS:  Lab Results   Component Value Date    CHOL 89 12/21/2022    CHOL 161 04/20/2020    HDL 27 (L) 12/21/2022    HDL 42 04/20/2020    LDL 41 12/21/2022    LDL 81 04/20/2020    TRIG 106 12/21/2022    TRIG 192 (H) 04/20/2020     LIVER ENZYME RESULTS:  Lab Results   Component Value Date    AST 25 08/10/2023     (H) 04/20/2020    ALT 7 08/10/2023    ALT 72 (H) 04/20/2020     CBC RESULTS:  Lab Results   Component Value Date    WBC 3.5 (L) 08/16/2023    WBC 4.0 04/20/2020    RBC 2.73 (L) 08/16/2023    RBC 3.95 (L) 04/20/2020    HGB 8.5 (L) 08/16/2023    HGB 14.3 04/20/2020    HCT 27.2 (L) 08/16/2023    HCT 43.1 04/20/2020     08/16/2023     (H) 04/20/2020    MCH 31.1 08/16/2023    MCH 36.2 (H) 04/20/2020    MCHC 31.3 (L) 08/16/2023    MCHC 33.2 04/20/2020    RDW 14.0 08/16/2023    RDW 12.7 04/20/2020    PLT 73 (L) 08/16/2023     (L) 04/20/2020     BMP RESULTS:  Lab Results   Component Value Date     08/10/2023     04/20/2020    POTASSIUM 4.4 08/16/2023    POTASSIUM 3.6 01/19/2023    POTASSIUM 3.4 12/29/2022    POTASSIUM 3.9 04/20/2020    CHLORIDE 95 (L) 08/10/2023    CHLORIDE 103 12/29/2022    CHLORIDE 105 08/05/2020    CHLORIDE 106 04/20/2020    CO2 32 (H) 08/10/2023    CO2 31 12/29/2022    CO2 28 04/20/2020    ANIONGAP 14 08/10/2023    ANIONGAP 3 12/29/2022    ANIONGAP 6 04/20/2020     (H) 08/16/2023    GLC  160 (H) 12/29/2022    GLC 93 04/20/2020    BUN 45.6 (H) 08/10/2023    BUN 46 (H) 12/29/2022    BUN 23 04/20/2020    CR 5.76 (H) 08/10/2023    CR 1.06 04/20/2020    GFRESTIMATED 11 (L) 08/10/2023    GFRESTIMATED 78 04/20/2020    GFRESTBLACK >90 04/20/2020    KELLY 9.5 08/10/2023    KELLY 9.2 04/20/2020      A1C RESULTS:  Lab Results   Component Value Date    A1C 4.7 08/10/2023    A1C 4.8 09/22/2016     INR RESULTS:  Lab Results   Component Value Date    INR 1.29 (H) 08/16/2023    INR 1.36 (H) 12/21/2022    INR 1.20 (H) 10/07/2019    INR 1.26 (H) 10/17/2017                       Thank you for allowing me to participate in the care of your patient.      Sincerely,     STEFFANY Vizcarra [facility]  cc:   No referring provider defined for this encounter.

## 2023-09-11 NOTE — PATIENT INSTRUCTIONS
Thank you for your visit with the [facility] today.    Today's Summary     Repeat limited echocardiogram in 1-2 weeks  Follow up in 1-month to discuss echo and heart monitor report.    If you have questions or concerns, please do not hesitate to call my nursing support team at [phone].    Scheduling phone number: [phone]  [facility] Clinic Number: [phone]    It was a pleasure seeing you today.     STEFFANY Vizcarra, CNP  Nurse Practitioner  [facility]  September 11, 2023  ________________________________________________________

## 2023-09-11 NOTE — PROGRESS NOTES
~Cardiology Clinic Visit~    Primary Cardiologist: Dr. Abreu  Reason for visit: 4-month follow up    History of Present Illness  Blanco Osborne is a very pleasant 58 year old male with a past medical history notable for Patient is 57 years old with alcoholic cirrhosis status post liver transplant in 2016, type 2 diabetes, chronic immunosuppression, PAF, previous stroke, hypertension, seizure disorder, obstructive sleep apnea on BiPAP following respiratory arrest in November 2022 which ended with end-stage renal disease needing dialysis.       He has been referred to cardiology following recent hospitalization where he was noted to have paroxysmal atrial fibrillation and appropriately started on anticoagulation for stroke prophylaxis, and consulted with Dr. Abreu on 5/4/23.  In the past, he has been seen by Dr. Morales at [facility] cardiology in 2016.     He was hospitalized for 3-months from 1/21/23 - 4/28/23.  He was recovering in long-term care at [facility] and transferred to [facility] on 1/21/2023 for evaluation of loculated pleural effusion.  During this he had acute metabolic encephalopathy with delirium, chronic hepatic encephalopathy, bilateral pleural effusions and acute respiratory failure requiring tracheostomy, ARDS, pneumonia.  He had multispecialty consultation including thoracic surgery, infectious diseases, pulmonology, neurology.  He was treated with antifungal therapy.  Had subacute bacterial peritonitis that resolved.     During all of this he had episodes of paroxysmal atrial fibrillation. But he is chronically anticoagulated on apixaban and currently in sinus rhythm at 65 bpm. He cannot tolerate rate controlling medication such as beta-blocker due to low resting blood pressure in the 100s systolic.     Echocardiogram in March 2023 showed normal LVEF, no significant valve disease, no obvious vegetations, but with note of a small posterior pericardial effusion without tamponade.   He had a follow up echocardiogram 4-months later.      TTE on 8/17/23 showed moderate, mostly posterior pericardial effusion without signs of tamponade.  Effusion increased in size up to 1.4-1.7 cm.  EF remains stable at 60-65%.    We repeated the study again, and TTE on 8/29/2023 showed similar moderate pericardial effusion localized posteriorly measuring up to 1.7 cm around the base.  There were no signs of tamponade.  IVC is normal in size with preserved response.  Compared to prior study, effusion is similar in size posteriorly, however, appears smaller around the apex.  EF remains stable at 55-60%.    Interval 9/11/23  Patient has no new cardiac concerns to discuss today.  He is fatigued from dialysis earlier this morning.  He denies SOB, chest pain or pressure, palpitations, orthopnea, PND.  He is a little lightheaded after dialysis, and they removed 2.5L fluid.  He has had no falls or syncope.  __________________________________________________________________         Assessment and Impression:     1.  Paroxysmal atrial fibrillation.  Currently in sinus rhythm.  Appropriately anticoagulated with apixaban.  Not a candidate for AV slowing drugs due to resting hypotension.  2.  Complex alcoholic liver cirrhosis, status post liver transplant in 2016, chronically immunosuppressed.  3.  End-stage renal disease, dialysis via PCAD on MWF, new AVF.  4.  Type 2 diabetes mellitus.  5.  Significant physical debility secondary to recent prolonged 3-month hospitalization for respiratory failure.  6. Pericardial effusion on echocardiogram; moderate and stable on last imaging.         Recommendations and Plan:     Repeat limited echocardiogram this week.  Counseled on symptoms of worsening effusion and when to seek emergent care.  Follow up in 1-month with HARRY to discuss testing results.  We will also discuss the results of the heart monitor, which unfortunately was still in process at the time of our visit  today.  __________________________________________________________________    Thank you for the opportunity to participate in this pleasant patient's care.    We would be happy to see this patient sooner for any concerns in the meantime.    STEFFANY Vizcarra, CNP   Nurse Practitioner  [facility]    Today's clinic visit entailed:  Review of the result(s) of each unique test - cardiac testing, multiple echocardiograms, hospitalization records, care everywhere records, labs  The following tests were independently interpreted by me as noted in my documentation: echocardiogram  Ordering of each unique test  Prescription drug management    The level of medical decision making during this visit was of moderate complexity.    Orders this Visit:  Orders Placed This Encounter   Procedures    Follow-Up with Cardiology- HARRY    Echocardiogram Limited     No orders of the defined types were placed in this encounter.    There are no discontinued medications.  Encounter Diagnoses   Name Primary?    Pericardial effusion Yes    ESRD (end stage renal disease) on dialysis (H)     CHRISTIAN on CPAP     Dyslipidemia     History of sudden cardiac arrest, PEA     Critical illness myopathy     Immunosuppression (H)     Seizures (H)      CURRENT MEDICATIONS:  Current Outpatient Medications   Medication Sig Dispense Refill    acetaminophen (TYLENOL) 325 MG tablet Take 2 tablets (650 mg) by mouth every 4 hours as needed for other (For optimal non-opioid multimodal pain management to improve pain control.)      apixaban ANTICOAGULANT (ELIQUIS) 5 MG tablet Take 1 tablet (5 mg) by mouth 2 times daily for 90 days 180 tablet 1    hydrOXYzine (ATARAX) 25 MG tablet Take 1 tablet (25 mg) by mouth 3 times daily as needed for itching 90 tablet 0    Lacosamide (VIMPAT) 100 MG TABS tablet Take 1 tablet (100 mg) by mouth every evening (Patient taking differently: Take 75 mg by mouth daily 25mg in the morning, 50mg in the evening) 31  tablet 5    melatonin 3 MG tablet Take 1 tablet (3 mg) by mouth At Bedtime 90 tablet 1    midodrine (PROAMATINE) 10 MG tablet Take 1 tab 3xDay, during patient's dialysis days , Monday, Wednesday, Friday, patient takes 2 extra Midodrine one hour before dialysis, takes 1 more Midodrine when he gets there, and takes 1 more tablet during dialysis, this is an addition to patient's scheduled doses 150 tablet 1    multivitamin RENAL (TRIPHROCAPS) 1 capsule capsule Take 1 capsule by mouth daily 30 capsule 1    sevelamer carbonate (RENVELA) 800 MG tablet Take 1,600 mg by mouth 3 times daily (with meals)      tacrolimus (GENERIC EQUIVALENT) 1 MG capsule Take 1 capsule (1 mg) by mouth every 12 hours 60 capsule 1    lacosamide (VIMPAT) 50 MG TABS tablet Take 1 tablet (50 mg) by mouth every morning (Patient not taking: Reported on 9/11/2023) 31 tablet 5    oxyCODONE (ROXICODONE) 5 MG tablet Take 1 tablet (5 mg) by mouth every 4 hours as needed for moderate pain (Patient not taking: Reported on 9/5/2023) 6 tablet 0    pantoprazole (PROTONIX) 40 MG EC tablet Take 1 tablet (40 mg) by mouth every morning (before breakfast) (Patient not taking: Reported on 9/5/2023) 30 tablet 1    rOPINIRole (REQUIP) 0.5 MG tablet Take 1 tablet (0.5 mg) by mouth 2 times daily (Patient not taking: Reported on 9/5/2023) 60 tablet 2    traZODone (DESYREL) 50 MG tablet Take 0.5-1 tablets (25-50 mg) by mouth nightly as needed for sleep (Patient not taking: Reported on 9/5/2023) 90 tablet 1     ALLERGIES   No Known Allergies  PAST MEDICAL, SURGICAL, FAMILY, SOCIAL HISTORY:  History was reviewed and updated as needed, see medical record.    Review of Systems:  A 10-point Review Of Systems is otherwise normal except for that which is noted in the HPI and interval summary.    Physical Exam:    Vitals: BP 95/58   Pulse 60   Ht 1.829 m (6')   Wt 86.6 kg (191 lb)   SpO2 98%   BMI 25.90 kg/m    Constitutional: Appears stated age, well nourished, NAD.  Neck:  Supple. Carotid pulses full and equal.   Respiratory: Non-labored. Lungs CTAB.  Cardiovascular: RRR, normal S1 and S2. No M/G/R.  No edema.  Musculoskeletal/Extremities: LUE AVF, positive thrill/bruit.  Neurologic: No gross focal deficits. Alert, awake, and oriented to all spheres.  Psychiatric: Affect appropriate. Mentation normal.    Recent Lab Results:  LIPID RESULTS:  Lab Results   Component Value Date    CHOL 89 12/21/2022    CHOL 161 04/20/2020    HDL 27 (L) 12/21/2022    HDL 42 04/20/2020    LDL 41 12/21/2022    LDL 81 04/20/2020    TRIG 106 12/21/2022    TRIG 192 (H) 04/20/2020     LIVER ENZYME RESULTS:  Lab Results   Component Value Date    AST 25 08/10/2023     (H) 04/20/2020    ALT 7 08/10/2023    ALT 72 (H) 04/20/2020     CBC RESULTS:  Lab Results   Component Value Date    WBC 3.5 (L) 08/16/2023    WBC 4.0 04/20/2020    RBC 2.73 (L) 08/16/2023    RBC 3.95 (L) 04/20/2020    HGB 8.5 (L) 08/16/2023    HGB 14.3 04/20/2020    HCT 27.2 (L) 08/16/2023    HCT 43.1 04/20/2020     08/16/2023     (H) 04/20/2020    MCH 31.1 08/16/2023    MCH 36.2 (H) 04/20/2020    MCHC 31.3 (L) 08/16/2023    MCHC 33.2 04/20/2020    RDW 14.0 08/16/2023    RDW 12.7 04/20/2020    PLT 73 (L) 08/16/2023     (L) 04/20/2020     BMP RESULTS:  Lab Results   Component Value Date     08/10/2023     04/20/2020    POTASSIUM 4.4 08/16/2023    POTASSIUM 3.6 01/19/2023    POTASSIUM 3.4 12/29/2022    POTASSIUM 3.9 04/20/2020    CHLORIDE 95 (L) 08/10/2023    CHLORIDE 103 12/29/2022    CHLORIDE 105 08/05/2020    CHLORIDE 106 04/20/2020    CO2 32 (H) 08/10/2023    CO2 31 12/29/2022    CO2 28 04/20/2020    ANIONGAP 14 08/10/2023    ANIONGAP 3 12/29/2022    ANIONGAP 6 04/20/2020     (H) 08/16/2023     (H) 12/29/2022    GLC 93 04/20/2020    BUN 45.6 (H) 08/10/2023    BUN 46 (H) 12/29/2022    BUN 23 04/20/2020    CR 5.76 (H) 08/10/2023    CR 1.06 04/20/2020    GFRESTIMATED 11 (L) 08/10/2023     GFRESTIMATED 78 04/20/2020    GFRESTBLACK >90 04/20/2020    KELLY 9.5 08/10/2023    KELLY 9.2 04/20/2020      A1C RESULTS:  Lab Results   Component Value Date    A1C 4.7 08/10/2023    A1C 4.8 09/22/2016     INR RESULTS:  Lab Results   Component Value Date    INR 1.29 (H) 08/16/2023    INR 1.36 (H) 12/21/2022    INR 1.20 (H) 10/07/2019    INR 1.26 (H) 10/17/2017

## 2023-09-14 ENCOUNTER — TELEPHONE (OUTPATIENT)
Dept: CARDIOLOGY | Facility: CLINIC | Age: 58
End: 2023-09-14
Payer: COMMERCIAL

## 2023-09-14 NOTE — TELEPHONE ENCOUNTER
ZIO Monitor results - Ziopatch started same day worn 8-17-23-8-31-23.   Patient was discharged 8/16/2023 after 1 day admit for fistula repair      Next HARRY OV 11-16-23.   Last HARRY OV 9-11-23 -        Assessment and Impression:     1.  Paroxysmal atrial fibrillation.  Currently in sinus rhythm.  Appropriately anticoagulated with apixaban.  Not a candidate for AV slowing drugs due to resting hypotension.  Follow up in 1-month with HARRY to discuss testing results. We will also discuss the results of the heart monitor, which unfortunately was still in process at the time of our visit today.     Last Dr. Abreu OV 5-4-23     -------------------------------------------------------------------------------------------------------

## 2023-09-18 NOTE — TELEPHONE ENCOUNTER
Per dictation from HARRY Brie Ocasio on 9/11/2023:Follow up in 1-month with HARRY to discuss testing results. We will also discuss the results of the heart monitor, which unfortunately was still in process at the time of our visit today.     Patient's next HARRY visit is 11/6/2023.

## 2023-09-20 NOTE — PROGRESS NOTES
[facility]    Blanco Osborne has a history of end-stage renal disease dialyzing via right jugular tunneled catheter.  Has a left upper arm brachial to cephalic fistula has developed 2 large pseudoaneurysms and outflow stenosis.      8/15/2023 outflow revision of proximal shoulder region cephalic vein to brachial vein, ligation of side branches with mobilization of fistula, repair of pseudoaneurysms x2.      Still using his right jugular tunneled catheter without issues.  He has had a successful liver transplant many years ago at [facility] and is presently looking into a renal transplant.      Exam: Alert and appropriate.  Comfortable.  Here with his wife.   Blood pressure 116/75 right arm.  Pulse 65.   Well-healing left arm access revision.   Strong pulse.  Easily palpable.   Marked area of narrowing in the midportion--dilates with compression    Duplex reveals a patent revised fistula particular at the cephalic to brachial vein transposition anastomosis.  Pseudoaneurysm repair sites also look very good.  There is a narrowing without a tourniquet at 3.3 mm as marked in the mid upper arm portion of the fistula.  Excellent outflow volume of 30 to 78 mL/min.          IMPRESSION: Very good outcome following fistula revision.  Excellent flow and diameter.  Area of narrowing is of no clinical concern.  Dialysis team may start using the fistula.  Would consider the arterial needle be placed just above the elbow and the outflow needle just beyond the stenotic area with the fistula straight going down into the brachial vein.    Discussed this with the patient.  Once transitioned over the fistula he will call us to have the right jugular catheter removed.    Due to the stenotic area follow-up duplex will be performed in 3 months.    We also discussed renal transplant which I do feel would be a very good idea for Blanco at his young age.    20 minutes with patient and wife today.    Deejay Mcneil MD    This note was created using Dragon voice recognition software which may result in transcription errors.

## 2023-09-21 ENCOUNTER — OFFICE VISIT (OUTPATIENT)
Dept: OTHER | Facility: CLINIC | Age: 58
End: 2023-09-21
Payer: COMMERCIAL

## 2023-09-21 ENCOUNTER — OFFICE VISIT (OUTPATIENT)
Dept: FAMILY MEDICINE | Facility: CLINIC | Age: 58
End: 2023-09-21
Payer: COMMERCIAL

## 2023-09-21 ENCOUNTER — HOSPITAL ENCOUNTER (OUTPATIENT)
Dept: ULTRASOUND IMAGING | Facility: CLINIC | Age: 58
Discharge: HOME | End: 2023-09-21
Payer: COMMERCIAL

## 2023-09-21 VITALS
BODY MASS INDEX: 26.41 KG/M2 | DIASTOLIC BLOOD PRESSURE: 72 MMHG | RESPIRATION RATE: 16 BRPM | WEIGHT: 195 LBS | TEMPERATURE: 97.7 F | HEART RATE: 94 BPM | OXYGEN SATURATION: 97 % | HEIGHT: 72 IN | SYSTOLIC BLOOD PRESSURE: 119 MMHG

## 2023-09-21 VITALS — SYSTOLIC BLOOD PRESSURE: 116 MMHG | DIASTOLIC BLOOD PRESSURE: 75 MMHG | HEART RATE: 65 BPM

## 2023-09-21 DIAGNOSIS — T82.898A OTHER SPECIFIED COMPLICATION OF VASCULAR PROSTHETIC DEVICES, IMPLANTS AND GRAFTS, INITIAL ENCOUNTER (H): ICD-10-CM

## 2023-09-21 DIAGNOSIS — Z99.2 ESRD (END STAGE RENAL DISEASE) ON DIALYSIS (H): ICD-10-CM

## 2023-09-21 DIAGNOSIS — B35.1 ONYCHOMYCOSIS: ICD-10-CM

## 2023-09-21 DIAGNOSIS — L29.9 ITCHING: ICD-10-CM

## 2023-09-21 DIAGNOSIS — Z98.890 HISTORY OF TRACHEOSTOMY: ICD-10-CM

## 2023-09-21 DIAGNOSIS — N18.6 ESRD (END STAGE RENAL DISEASE) ON DIALYSIS (H): ICD-10-CM

## 2023-09-21 DIAGNOSIS — H61.23 BILATERAL IMPACTED CERUMEN: Primary | ICD-10-CM

## 2023-09-21 DIAGNOSIS — I77.0 ARTERIOVENOUS FISTULA (H): Primary | ICD-10-CM

## 2023-09-21 DIAGNOSIS — R49.0 VOICE HOARSENESS: ICD-10-CM

## 2023-09-21 PROCEDURE — 90686 IIV4 VACC NO PRSV 0.5 ML IM: CPT | Performed by: PHYSICIAN ASSISTANT

## 2023-09-21 PROCEDURE — 90472 IMMUNIZATION ADMIN EACH ADD: CPT | Performed by: PHYSICIAN ASSISTANT

## 2023-09-21 PROCEDURE — 90750 HZV VACC RECOMBINANT IM: CPT | Performed by: PHYSICIAN ASSISTANT

## 2023-09-21 PROCEDURE — 69210 REMOVE IMPACTED EAR WAX UNI: CPT | Performed by: PHYSICIAN ASSISTANT

## 2023-09-21 PROCEDURE — 99213 OFFICE O/P EST LOW 20 MIN: CPT | Performed by: SURGERY

## 2023-09-21 PROCEDURE — G0008 ADMIN INFLUENZA VIRUS VAC: HCPCS | Mod: 59 | Performed by: PHYSICIAN ASSISTANT

## 2023-09-21 PROCEDURE — 93990 DOPPLER FLOW TESTING: CPT

## 2023-09-21 PROCEDURE — G0463 HOSPITAL OUTPT CLINIC VISIT: HCPCS | Performed by: SURGERY

## 2023-09-21 PROCEDURE — 99215 OFFICE O/P EST HI 40 MIN: CPT | Mod: 25 | Performed by: PHYSICIAN ASSISTANT

## 2023-09-21 RX ORDER — GABAPENTIN 100 MG/1
100 CAPSULE ORAL DAILY
Qty: 30 CAPSULE | Refills: 2 | Status: ON HOLD | OUTPATIENT
Start: 2023-09-21 | End: 2023-11-01

## 2023-09-21 ASSESSMENT — PAIN SCALES - GENERAL: PAINLEVEL: NO PAIN (0)

## 2023-09-21 NOTE — PROGRESS NOTES
[facility]        Patient is here for a  follow up.    Pt is currently taking Eliquis.    /75 (BP Location: Right arm, Patient Position: Chair, Cuff Size: Adult Regular)   Pulse 65     The provider has been notified that the patient has no concerns.     Questions patient would like addressed today are: N/A.    Refills are needed: N/A    Has homecare services and agency name:  Parul Valdez MA

## 2023-09-21 NOTE — PROGRESS NOTES
Prior to immunization administration, verified patients identity using patient s name and date of birth. Please see Immunization Activity for additional information.     Screening Questionnaire for Adult Immunization    Are you sick today?   No   Do you have allergies to medications, food, a vaccine component or latex?   No   Have you ever had a serious reaction after receiving a vaccination?   No   Do you have a long-term health problem with heart, lung, kidney, or metabolic disease (e.g., diabetes), asthma, a blood disorder, no spleen, complement component deficiency, a cochlear implant, or a spinal fluid leak?  Are you on long-term aspirin therapy?   Yes, kidney   Do you have cancer, leukemia, HIV/AIDS, or any other immune system problem?   No   Do you have a parent, brother, or sister with an immune system problem?   No   In the past 3 months, have you taken medications that affect  your immune system, such as prednisone, other steroids, or anticancer drugs; drugs for the treatment of rheumatoid arthritis, Crohn s disease, or psoriasis; or have you had radiation treatments?   No   Have you had a seizure, or a brain or other nervous system problem?   Yes, 1 seizure   During the past year, have you received a transfusion of blood or blood    products, or been given immune (gamma) globulin or antiviral drug?   No   For women: Are you pregnant or is there a chance you could become       pregnant during the next month?   No   Have you received any vaccinations in the past 4 weeks?   No     Immunization questionnaire was positive for at least one answer.  Notified pcp. Flu and shingrix admin Right arm    No left arm due to Dialysis AV fistula      Patient instructed to remain in clinic for 15 minutes afterwards, and to report any adverse reactions.     Screening performed by Lulu Godfrey MA on 9/21/2023 at 5:13 PM.

## 2023-09-21 NOTE — TELEPHONE ENCOUNTER
Contacted patient and introduced myself as their Transplant Coordinator, also introduced the role of the Transplant Coordinator in the transplant process.  Explained the purpose of this call including reviewing next steps and answering questions.    Confirmed Referring Provider, Dialysis Center, and Primary Care Physician. Notified patient of the importance of continued communication with referring providers and primary care physicians.    Reviewed components of transplant evaluation process including necessary appointments, tests, and procedures.    Answered questions for patient regarding evaluation, provided my name and contact information and requested they call with any additional questions.    Determined that patient would like additional information regarding transplant by:     Drop Down choices: Mail, Email, MyChart, Phone Call   Encourage MyChart   Notified patients that they will hear from a Transplant  to schedule evaluation.      Reviewed pt's chart for pre-kidney transplant evaluation planning. Pt lives in [place], [place]. Pt is s/p liver tx 9/2016 d/t LORENA, now w/ ESKD on HD following extensive hospitalization 11/2022 w/ acute hypoxic and hypercapnic respiratory failure, PEA arrest, strep pneumoniae, pleural effusion growing Candida, seizure, and a-fib, resulting in tracheostomy, PEG, and chest tube- all of which are now removed.  Pt's primary Nephrologist is Dr. Zenon Bradshaw, of note, does take 10 mg Midodrine on dialysis days. Other hx includes seizure - he is following w/ Neurology who is weaning his Lacosamid, RLS, GERD, skin cancer (pt reports SCC - records requested), A-fib on Eliquis (reports he may be taken off, underwent ziopatch monitoring w/ Cardiology), anemia, Splenomegaly noted on 3/9/23 CT scan, pt to see liver tx team again in December.  BMI 25.77. Colonoscopy last done 2019 - thinks he may have had on since, requested [facility] records to be sent.  Dental: UTD.  Pt is not a smoker,  does not consume alcohol, and does not use recreational drugs. Pt is independent w/ ADLs - feels that he is back to his baseline from a functional standpoint.  Pt lives w/ wife and will have support following transplant. Pt may have potential living donors.     I also introduced [employer] and asked pt to create an account and view pre-kidney transplant videos for review with me following evaluation. Confirmed STD 9/28/23. Informed pt they will hear from scheduling to arrange the evaluation. Smartset orders entered.

## 2023-09-21 NOTE — PROGRESS NOTES
Assessment & Plan     Bilateral impacted cerumen  Ear lavage performed bilaterally.  Cerumen removed.  Patient tolerated procedure well.  Follow-up as needed for this complaint.  - REMOVE IMPACTED CERUMEN    Itching  Reasonable to use gabapentin as needed for pruritus which is persistent.  Does use hydroxyzine which is helpful on occasion.  Discussed this medication can also work off label for restless leg for which she takes Requip.  Max 300 mg of gabapentin daily due to ESRD.  Start with 100 mg.  - gabapentin (NEURONTIN) 100 MG capsule  Dispense: 30 capsule; Refill: 2    Voice hoarseness  History of tracheostomy  Referral placed to ENT specialty care for further evaluation.  - Adult ENT  Referral    Onychomycosis  Referral placed to podiatry for further foot management.  Would not be a candidate for oral antifungals due to liver transplant.  - Orthopedic  Referral    Shingrix and flu shot given today.  Second Shingrix in 2 to 6 months.  RSV shot at the pharmacy.    40 minutes spent by me on the date of the encounter doing chart review, review of outside records, review of test results, interpretation of tests, patient visit, and documentation        The likelihood of other entities in the differential is insufficient to justify any further testing for them at this time. This was explained to the patient. The patient was advised that persistent or worsening symptoms would require further evaluation. Patient advised to call the office and if unable to reach to go to the emergency room if they develop any new or worsening symptoms. Expressed understanding and agreement with above stated plan.     WALDO Ronquillo [facility] MECHE Simeon is a 58 year old, presenting for the following health issues:  Ear Problem    Here today for follow-up.    -bilateral cerumen impaction, requesting ear lavage.  -wants flu and shingles immunization.  -recovering well following  left arm fistula repair.   -on-going cardiology follow-up  -kidney transplant assessment upcoming  -Toenail fungus, right foot.  Wishes to see podiatry.  -Ongoing voice hoarseness following intubation/tracheotomy.  Wishes to see ENT.       Review of Systems   Constitutional, HEENT, cardiovascular, pulmonary, GI, , musculoskeletal, neuro, skin, endocrine and psych systems are negative, except as otherwise noted.      Objective    /72 (BP Location: Right arm, Patient Position: Chair, Cuff Size: Adult Regular)   Pulse 94   Temp 97.7  F (36.5  C) (Temporal)   Resp 16   Ht 1.829 m (6')   Wt 88.5 kg (195 lb)   SpO2 97%   BMI 26.45 kg/m    Body mass index is 26.45 kg/m .  Physical Exam   GENERAL: healthy, alert and no distress  EYES: Eyes grossly normal to inspection, PERRL and conjunctivae and sclerae normal  HENT: Mild amount of bilateral cerumen impaction otherwise ear canals and TM's normal, nose and mouth without ulcers or lesions  NECK: no adenopathy, no asymmetry -tracheotomy scar.  RESP: lungs clear to auscultation - no rales, rhonchi or wheezes  CV: regular rate and rhythm, normal S1 S2, no S3 or S4, no murmur, click or rub, no peripheral edema and peripheral pulses strong  MS: no gross musculoskeletal defects noted, no edema  SKIN: no suspicious lesions or rashes  NEURO: Normal strength and tone, mentation intact and speech normal  PSYCH: mentation appears normal, affect normal/bright

## 2023-09-25 ENCOUNTER — TELEPHONE (OUTPATIENT)
Dept: TRANSPLANT | Facility: CLINIC | Age: 58
End: 2023-09-25
Payer: COMMERCIAL

## 2023-09-25 NOTE — TELEPHONE ENCOUNTER
Spoke with patient. Confirmed upcoming PKE appointments at [facility] on 9/28/23 starting at 0730. Instructed patient may eat breakfast and take regularly scheduled medications.

## 2023-09-27 ENCOUNTER — HOSPITAL ENCOUNTER (INPATIENT)
Facility: CLINIC | Age: 58
LOS: 4 days | Discharge: HOME | DRG: 314 | End: 2023-10-01
Attending: HOSPITALIST | Admitting: HOSPITALIST
Payer: COMMERCIAL

## 2023-09-27 ENCOUNTER — HOSPITAL ENCOUNTER (OUTPATIENT)
Dept: CARDIOLOGY | Facility: CLINIC | Age: 58
Discharge: HOME | DRG: 314 | End: 2023-09-27
Attending: NURSE PRACTITIONER
Payer: COMMERCIAL

## 2023-09-27 ENCOUNTER — TELEPHONE (OUTPATIENT)
Dept: CARDIOLOGY | Facility: CLINIC | Age: 58
End: 2023-09-27

## 2023-09-27 ENCOUNTER — TELEPHONE (OUTPATIENT)
Dept: TRANSPLANT | Facility: CLINIC | Age: 58
End: 2023-09-27

## 2023-09-27 DIAGNOSIS — I31.39 PERICARDIAL EFFUSION: ICD-10-CM

## 2023-09-27 DIAGNOSIS — I31.39 PERICARDIAL EFFUSION: Primary | ICD-10-CM

## 2023-09-27 LAB
ALBUMIN SERPL BCG-MCNC: 4.3 G/DL (ref 3.5–5.2)
ALP SERPL-CCNC: 201 U/L (ref 40–129)
ALT SERPL W P-5'-P-CCNC: 14 U/L (ref 0–70)
ANION GAP SERPL CALCULATED.3IONS-SCNC: 14 MMOL/L (ref 7–15)
AST SERPL W P-5'-P-CCNC: 28 U/L (ref 0–45)
BASOPHILS # BLD AUTO: 0 10E3/UL (ref 0–0.2)
BASOPHILS NFR BLD AUTO: 1 %
BILIRUB SERPL-MCNC: 0.5 MG/DL
BUN SERPL-MCNC: 60 MG/DL (ref 8–23)
CALCIUM SERPL-MCNC: 9.5 MG/DL (ref 8.6–10)
CHLORIDE SERPL-SCNC: 95 MMOL/L (ref 98–107)
CREAT SERPL-MCNC: 6.27 MG/DL (ref 0.67–1.17)
DEPRECATED HCO3 PLAS-SCNC: 30 MMOL/L (ref 22–29)
EGFRCR SERPLBLD CKD-EPI 2021: 10 ML/MIN/1.73M2
EOSINOPHIL # BLD AUTO: 0.2 10E3/UL (ref 0–0.7)
EOSINOPHIL NFR BLD AUTO: 5 %
ERYTHROCYTE [DISTWIDTH] IN BLOOD BY AUTOMATED COUNT: 14.1 % (ref 10–15)
GLUCOSE SERPL-MCNC: 96 MG/DL (ref 70–99)
HCT VFR BLD AUTO: 29.7 % (ref 40–53)
HGB BLD-MCNC: 9.5 G/DL (ref 13.3–17.7)
HOLD SPECIMEN: NORMAL
IMM GRANULOCYTES # BLD: 0 10E3/UL
IMM GRANULOCYTES NFR BLD: 0 %
LVEF ECHO: NORMAL
LYMPHOCYTES # BLD AUTO: 1.6 10E3/UL (ref 0.8–5.3)
LYMPHOCYTES NFR BLD AUTO: 37 %
MCH RBC QN AUTO: 31 PG (ref 26.5–33)
MCHC RBC AUTO-ENTMCNC: 32 G/DL (ref 31.5–36.5)
MCV RBC AUTO: 97 FL (ref 78–100)
MONOCYTES # BLD AUTO: 0.5 10E3/UL (ref 0–1.3)
MONOCYTES NFR BLD AUTO: 11 %
NEUTROPHILS # BLD AUTO: 2 10E3/UL (ref 1.6–8.3)
NEUTROPHILS NFR BLD AUTO: 46 %
NRBC # BLD AUTO: 0 10E3/UL
NRBC BLD AUTO-RTO: 0 /100
PLATELET # BLD AUTO: 109 10E3/UL (ref 150–450)
POTASSIUM SERPL-SCNC: 4.2 MMOL/L (ref 3.4–5.3)
PROT SERPL-MCNC: 8 G/DL (ref 6.4–8.3)
RBC # BLD AUTO: 3.06 10E6/UL (ref 4.4–5.9)
SODIUM SERPL-SCNC: 139 MMOL/L (ref 135–145)
WBC # BLD AUTO: 4.3 10E3/UL (ref 4–11)

## 2023-09-27 PROCEDURE — 250N000013 HC RX MED GY IP 250 OP 250 PS 637: Performed by: HOSPITALIST

## 2023-09-27 PROCEDURE — 99223 1ST HOSP IP/OBS HIGH 75: CPT | Performed by: HOSPITALIST

## 2023-09-27 PROCEDURE — 93325 DOPPLER ECHO COLOR FLOW MAPG: CPT

## 2023-09-27 PROCEDURE — 93321 DOPPLER ECHO F-UP/LMTD STD: CPT | Mod: 26 | Performed by: INTERNAL MEDICINE

## 2023-09-27 PROCEDURE — 93325 DOPPLER ECHO COLOR FLOW MAPG: CPT | Mod: 26 | Performed by: INTERNAL MEDICINE

## 2023-09-27 PROCEDURE — 86706 HEP B SURFACE ANTIBODY: CPT | Performed by: INTERNAL MEDICINE

## 2023-09-27 PROCEDURE — 87340 HEPATITIS B SURFACE AG IA: CPT | Performed by: INTERNAL MEDICINE

## 2023-09-27 PROCEDURE — 210N000002 HC R&B HEART CARE

## 2023-09-27 PROCEDURE — 250N000012 HC RX MED GY IP 250 OP 636 PS 637: Performed by: HOSPITALIST

## 2023-09-27 PROCEDURE — 93321 DOPPLER ECHO F-UP/LMTD STD: CPT

## 2023-09-27 PROCEDURE — 85049 AUTOMATED PLATELET COUNT: CPT | Performed by: HOSPITALIST

## 2023-09-27 PROCEDURE — 93308 TTE F-UP OR LMTD: CPT | Mod: 26 | Performed by: INTERNAL MEDICINE

## 2023-09-27 PROCEDURE — 82040 ASSAY OF SERUM ALBUMIN: CPT | Performed by: HOSPITALIST

## 2023-09-27 RX ORDER — ONDANSETRON 2 MG/ML
4 INJECTION INTRAMUSCULAR; INTRAVENOUS EVERY 6 HOURS PRN
Status: DISCONTINUED | OUTPATIENT
Start: 2023-09-27 | End: 2023-10-01 | Stop reason: HOSPADM

## 2023-09-27 RX ORDER — ACETAMINOPHEN 325 MG/1
975 TABLET ORAL
Status: COMPLETED | OUTPATIENT
Start: 2023-09-27 | End: 2023-09-27

## 2023-09-27 RX ORDER — NALOXONE HYDROCHLORIDE 0.4 MG/ML
0.2 INJECTION, SOLUTION INTRAMUSCULAR; INTRAVENOUS; SUBCUTANEOUS
Status: DISCONTINUED | OUTPATIENT
Start: 2023-09-27 | End: 2023-10-01 | Stop reason: HOSPADM

## 2023-09-27 RX ORDER — LACOSAMIDE 100 MG/1
100 TABLET ORAL AT BEDTIME
Status: DISCONTINUED | OUTPATIENT
Start: 2023-09-27 | End: 2023-10-01 | Stop reason: HOSPADM

## 2023-09-27 RX ORDER — ONDANSETRON 4 MG/1
4 TABLET, ORALLY DISINTEGRATING ORAL EVERY 6 HOURS PRN
Status: DISCONTINUED | OUTPATIENT
Start: 2023-09-27 | End: 2023-10-01 | Stop reason: HOSPADM

## 2023-09-27 RX ORDER — PANTOPRAZOLE SODIUM 40 MG/1
40 TABLET, DELAYED RELEASE ORAL
Status: DISCONTINUED | OUTPATIENT
Start: 2023-09-28 | End: 2023-09-27

## 2023-09-27 RX ORDER — TACROLIMUS 1 MG/1
1 CAPSULE ORAL EVERY 12 HOURS
Status: DISCONTINUED | OUTPATIENT
Start: 2023-09-27 | End: 2023-10-01 | Stop reason: HOSPADM

## 2023-09-27 RX ORDER — ACETAMINOPHEN 325 MG/1
650 TABLET ORAL EVERY 4 HOURS PRN
Status: DISCONTINUED | OUTPATIENT
Start: 2023-09-27 | End: 2023-10-01 | Stop reason: HOSPADM

## 2023-09-27 RX ORDER — ROPINIROLE 0.5 MG/1
0.5 TABLET, FILM COATED ORAL 2 TIMES DAILY PRN
COMMUNITY
End: 2023-11-09

## 2023-09-27 RX ORDER — MIDODRINE HYDROCHLORIDE 5 MG/1
10 TABLET ORAL
Status: DISCONTINUED | OUTPATIENT
Start: 2023-09-29 | End: 2023-10-01 | Stop reason: HOSPADM

## 2023-09-27 RX ORDER — POLYETHYLENE GLYCOL 3350 17 G/17G
17 POWDER, FOR SOLUTION ORAL DAILY PRN
Status: DISCONTINUED | OUTPATIENT
Start: 2023-09-27 | End: 2023-10-01 | Stop reason: HOSPADM

## 2023-09-27 RX ORDER — ROPINIROLE 0.5 MG/1
0.5 TABLET, FILM COATED ORAL 2 TIMES DAILY PRN
Status: DISCONTINUED | OUTPATIENT
Start: 2023-09-27 | End: 2023-10-01 | Stop reason: HOSPADM

## 2023-09-27 RX ORDER — SEVELAMER CARBONATE 800 MG/1
800 TABLET, FILM COATED ORAL 4 TIMES DAILY
Status: DISCONTINUED | OUTPATIENT
Start: 2023-09-28 | End: 2023-10-01 | Stop reason: HOSPADM

## 2023-09-27 RX ORDER — GABAPENTIN 100 MG/1
100 CAPSULE ORAL EVERY EVENING
Status: DISCONTINUED | OUTPATIENT
Start: 2023-09-27 | End: 2023-10-01 | Stop reason: HOSPADM

## 2023-09-27 RX ORDER — AMOXICILLIN 250 MG
2 CAPSULE ORAL 2 TIMES DAILY
Status: DISCONTINUED | OUTPATIENT
Start: 2023-09-27 | End: 2023-10-01 | Stop reason: HOSPADM

## 2023-09-27 RX ORDER — LACOSAMIDE 50 MG/1
50 TABLET ORAL EVERY MORNING
Status: DISCONTINUED | OUTPATIENT
Start: 2023-09-28 | End: 2023-10-01 | Stop reason: HOSPADM

## 2023-09-27 RX ORDER — NALOXONE HYDROCHLORIDE 0.4 MG/ML
0.4 INJECTION, SOLUTION INTRAMUSCULAR; INTRAVENOUS; SUBCUTANEOUS
Status: DISCONTINUED | OUTPATIENT
Start: 2023-09-27 | End: 2023-10-01 | Stop reason: HOSPADM

## 2023-09-27 RX ORDER — OXYCODONE HYDROCHLORIDE 5 MG/1
5 TABLET ORAL EVERY 4 HOURS PRN
Status: DISCONTINUED | OUTPATIENT
Start: 2023-09-27 | End: 2023-09-29

## 2023-09-27 RX ORDER — HYDROXYZINE HYDROCHLORIDE 25 MG/1
25 TABLET, FILM COATED ORAL 3 TIMES DAILY PRN
Status: DISCONTINUED | OUTPATIENT
Start: 2023-09-27 | End: 2023-10-01 | Stop reason: HOSPADM

## 2023-09-27 RX ORDER — LACOSAMIDE 50 MG/1
50 TABLET ORAL EVERY MORNING
COMMUNITY
End: 2023-12-08

## 2023-09-27 RX ORDER — AMOXICILLIN 250 MG
1 CAPSULE ORAL 2 TIMES DAILY
Status: DISCONTINUED | OUTPATIENT
Start: 2023-09-27 | End: 2023-10-01 | Stop reason: HOSPADM

## 2023-09-27 RX ADMIN — LACOSAMIDE 100 MG: 100 TABLET, FILM COATED ORAL at 22:02

## 2023-09-27 RX ADMIN — ACETAMINOPHEN 975 MG: 325 TABLET, FILM COATED ORAL at 20:22

## 2023-09-27 RX ADMIN — TACROLIMUS 1 MG: 1 CAPSULE ORAL at 23:18

## 2023-09-27 RX ADMIN — GABAPENTIN 100 MG: 100 CAPSULE ORAL at 20:21

## 2023-09-27 ASSESSMENT — ACTIVITIES OF DAILY LIVING (ADL)
ADLS_ACUITY_SCORE: 22
ADLS_ACUITY_SCORE: 20
ADLS_ACUITY_SCORE: 20

## 2023-09-27 NOTE — PROGRESS NOTES
Vitals: WNL  Lungs: WNL, RLL diminished. Room air. Denies SOB  CV: NSR. Denies CP or pressure.   GI:Rounded. WNL  Uro: On hemodialysis   CMS: baseline neuropathy, otherwise WNL  Neuro: no deficits noted.   Skin: Dry. Scabs.   Psych: WNL  MSK: Indp  Pain: denies   Labs: Stable per hx chart review.   Tests/Procedures: Possible pericardial centesis tomorrow  IV/Lines: left arm fistula. Right internal jugular dialysis cath. Peripheral IV.   Other:  Cardiology consult and Nephrology consult in place.   Dialysis on MWF.

## 2023-09-27 NOTE — TELEPHONE ENCOUNTER
Call from Dr. Abreu re: echo results. Patient needs to be admitted sometime today or tomorrow to a cardiac bed for a tap of his pericardial effusion.     Called bed placement and they will be working on a placement in the cardiac unit.      Call from bed placement: patient may check in at the  at 4PM today and explain he is a direct admit for the heart center.    1530 wife picked up the phone, she thinks the patient would rather be at the [facility]. He is in the process of getting a transplant and is working closely with the staff.    1535 spoke with the patient. He also mentioned the U, reviewed that our bed placement does not manage the U and can only offer a place at [facility]. Patient states he is waiting for his spouse and then will head over.

## 2023-09-27 NOTE — TELEPHONE ENCOUNTER
Pt's wife, Ofe called to let me know that the pt had an echo today showing fluid around his heart and it will need to be drained. The call dropped. I called her back and left VM explaining we will need to cancel his PKE if he is admitted and asked that she call back w/ an update. Provided direct line.

## 2023-09-27 NOTE — H&P
[facility]  History and Physical   Hospitalist  Carl Green MD       Blanco Osborne MRN# [id number]   YOB: 1965 Age: 58 year old      Date of Admission:  9/27/2023         Assessment and Plan:   Blanco Osborne is a 58 year old male with extensive PMH including h/o liver transplant 2016 due to alcohol abuse, pAF (on chronic anticoagulation), history of diabetes mellitus type 2, CVA, hypertension, CHRISTIAN on BiPAP, h/o respiratory failure with parainfluenza and  strep pneumoniae with parapneumonic pleural effusion, h/o pericardial effusion who was sent from cardiology clinic as a direct admission for worsening pericardial effusion, admitted inpatient 9/27/23.    He has history of prolonged hospitalization early 2023. 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  He was recovering in LTACH and then had a prolonged hospital stay at [facility] (1/21/23 to 4/28/23) with loculated pleural effusion.     Worsening pericardial effusion  -sent from cardiology clinic; ECHO 9/27 noted with moderate pericardial effusion but with no tamponade physiology, EF 60-65 %  -currently hemodynamically stable; not hypoxic  -will consult cardiology  -will keep NPO after midnight  -will get routine labs including CBC, BMP, LFTs    H/o prolonged hospitalization with respiratory failure/ARDS following parainfluenza and strep pneumoniae with b/l pleural effusion, respiratory failure requiring tracheostomy-- now resolved    ESRD on hemodialysis  H/o Left AV fistula pseudoaneurysm repair (8/2023 by vascular surgery)  -gets Monday, Wednesday, Friday dialysis; dialyzed 9/27/23  -will get nephrology consulted for continuation of dialysis  -has left upper extremity AV fistula; also right chest dialysis catheter    Chronic  liver disease, history of liver transplant 2016  cirrhosis with ascites  H/o SBP  -will check LFTs  -will resume PTA tacrolimus when verified by pharmacy    History of seizure, on Keppra and Vimpat, resume when verified    Paroxysmal on atrial fibrillation  History of embolic strokes  H/o PEA arrest  H/o Chronic hypotension  -currently in sinus rhythm  -will hold off on Eliquis at this time until cardiology evaluation for likely pericardial synthesis  -will resume PTA Midodrine when verified by pharmacy    GERD: resume PTA Protonix when verified    Clinically Significant Risk Factors Present on Admission               # Drug Induced Coagulation Defect: home medication list includes an anticoagulant medication    # Hypertension: Noted on problem list      # Overweight: Estimated body mass index is 26.45 kg/m  as calculated from the following:    Height as of 9/21/23: 1.829 m (6').    Weight as of 9/21/23: 88.5 kg (195 lb).                 DVT prophylaxis: hold Eliquis, cardiology okay with cardiology  Code status: full code    Care plan discussed with patient and nursing           Primary Care Physician:   Ki Carter [phone]         Chief Complaint:     Sent from cardiology clinic, worsening pericardial effusion    History is obtained from the patient         History of Present Illness:     Blanco Osborne is a 58 year old male with extensive PMH including h/o liver transplant 2016 due to alcohol abuse, pAF (on chronic anticoagulation), history of diabetes mellitus type 2, CVA, hypertension, CHRISTIAN on BiPAP, h/o respiratory failure with parainfluenza and  strep pneumoniae with parapneumonic pleural effusion, h/o pericardial effusion who was sent from cardiology clinic as a direct admission for worsening pericardial effusion, admitted inpatient 9/27/23.    He has history of prolonged hospitalization early 2023. In 11/2022 he had respiratory arrest and was diagnosed with parainfluenza and strep pneumoniae  and acute on chronic renal insufficiency, which ended with ESRD, needing dialysis initiation and also found to have cirrhosis of transplanted liver.  He was recovering in LTACH and then had a prolonged hospital stay at [facility] (1/21/23 to 4/28/23) with loculated pleural effusion.     He was being followed by cardiology and had follow-up echo today and was noted with worsening moderate pericardial effusion. Cardiology suggested direct admission for consideration of pericardiocentesis.    The patient denies any fever, chills, rigors, chest pain or shortness of breath.  Denies pain abdomen.  No bowel or bladder disturbances.           Past Medical History:     Pericardial effusion  H/o prolonged hospitalization with respiratory failure/ARDS following parainfluenza and strep pneumoniae with b/l pleural effusion, respiratory failure requiring tracheostomy-- now resolved  ESRD on hemodialysis  H/o Left AV fistula pseudoaneurysm repair (8/2023 by vascular surgery)  Chronic liver disease, history of liver transplant 2016  cirrhosis with ascites  H/o SBP  History of seizure, on Keppra and Vimpat, resume when verified  Paroxysmal on atrial fibrillation  History of embolic strokes  H/o PEA arrest  H/o Chronic hypotension  GERD          Past Surgical History:     Past Surgical History:   Procedure Laterality Date    APPENDECTOMY      BENCH LIVER N/A 09/20/2016    Procedure: BENCH LIVER;  Surgeon: Enoc Crews MD;  Location: [facility] OR    BRONCHOSCOPY FLEXIBLE AND RIGID N/A 12/20/2022    Procedure: BRONCHOSCOPY;  Surgeon: Alena Valenzuela MD;  Location:  GI    COLONOSCOPY  08/06/2013    repeat in 2018    COLONOSCOPY N/A 10/04/2019    Procedure: COLONOSCOPY, WITH POLYPECTOMY AND BIOPSY;  Surgeon: Go Chong MD;  Location:  OR    CREATE FISTULA ARTERIOVENOUS UPPER EXTREMITY Left 03/21/2023    Procedure: LEFT UPPER EXTREMITY ARTERIOVENOUS FISTULA CREATION. LIGATION OF COMPETING VASCULAR BRANCHES- LEFT;  Surgeon:  Deejay Mcneil MD;  Location: [facility] OR    HERNIA REPAIR      IR CHEST TUBE PLACEMENT NON-TUNNELED RIGHT  2022    IR CVC TUNNEL PLACEMENT > 5 YRS OF AGE  2022    IR DIALYSIS FISTULOGRAM LEFT  2023    IR GASTROSTOMY TUBE CHANGE  2023    IR GASTROSTOMY TUBE PERCUTANEOUS PLCMNT  2022    IR PARACENTESIS  2022    IR PARACENTESIS  2022    IR THORACENTESIS  2022    IR THORACENTESIS  2020    IR THORACENTESIS  08/10/2020    IR TRANSCATHETER BIOPSY  2022    REVISION FISTULA ARTERIOVENOUS UPPER EXTREMITY Left 8/15/2023    Procedure: 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 2; OUTFLOW REVISION CEPHALIC TO BRACHIAL VEIN;  Surgeon: Deejay Mcneil MD;  Location: [facility] OR    TRACHEOSTOMY N/A 2022    Procedure: TRACHEOSTOMY;  Surgeon: Nilesh Jackson MD;  Location: [facility] OR    TRANSPLANT LIVER RECIPIENT  DONOR N/A 2016    Procedure: TRANSPLANT LIVER RECIPIENT  DONOR;  Surgeon: Enoc Crews MD;  Location: [facility] OR              Home Medications:     Prior to Admission Medications   Prescriptions Last Dose Informant Patient Reported? Taking?   Lacosamide (VIMPAT) 100 MG TABS tablet   No No   Sig: Take 1 tablet (100 mg) by mouth every evening   Patient taking differently: Take 75 mg by mouth daily 25mg in the morning, 50mg in the evening   acetaminophen (TYLENOL) 325 MG tablet   No No   Sig: Take 2 tablets (650 mg) by mouth every 4 hours as needed for other (For optimal non-opioid multimodal pain management to improve pain control.)   apixaban ANTICOAGULANT (ELIQUIS) 5 MG tablet   No No   Sig: Take 1 tablet (5 mg) by mouth 2 times daily for 90 days   gabapentin (NEURONTIN) 100 MG capsule   No No   Sig: Take 1 capsule (100 mg) by mouth daily   hydrOXYzine (ATARAX) 25 MG tablet   No No   Sig: Take 1 tablet (25 mg) by mouth 3 times daily as needed for itching   melatonin 3 MG tablet   No No   Sig: Take 1 tablet (3 mg) by mouth  At Bedtime   midodrine (PROAMATINE) 10 MG tablet   No No   Sig: Take 1 tab 3xDay, during patient's dialysis days , Monday, Wednesday, Friday, patient takes 2 extra Midodrine one hour before dialysis, takes 1 more Midodrine when he gets there, and takes 1 more tablet during dialysis, this is an addition to patient's scheduled doses   multivitamin RENAL (TRIPHROCAPS) 1 capsule capsule   No No   Sig: Take 1 capsule by mouth daily   oxyCODONE (ROXICODONE) 5 MG tablet   No No   Sig: Take 1 tablet (5 mg) by mouth every 4 hours as needed for moderate pain   pantoprazole (PROTONIX) 40 MG EC tablet   No No   Sig: Take 1 tablet (40 mg) by mouth every morning (before breakfast)   sevelamer carbonate (RENVELA) 800 MG tablet   Yes No   Sig: Take 1,600 mg by mouth 3 times daily (with meals)   tacrolimus (GENERIC EQUIVALENT) 1 MG capsule   No No   Sig: Take 1 capsule (1 mg) by mouth every 12 hours   traZODone (DESYREL) 50 MG tablet   No No   Sig: Take 0.5-1 tablets (25-50 mg) by mouth nightly as needed for sleep      Facility-Administered Medications: None            Allergies:   No Known Allergies         Social History:   Blanco Osborne  reports that he has never smoked. He has never used smokeless tobacco. He reports that he does not currently use alcohol. He reports that he does not use drugs.              Family History:   Blanco Osborne family history is not on file.    Family history was reviewed by myself and not pertinent to current presentation.           Review of Systems:   A10 point Review of Systems was done and were negative other than noted in the HPI.             Physical Exam:   There were no vitals taken for this visit.  0 lbs 0 oz        Constitutional: Alert, awake and oriented X 3; lying comfortably in bed in no apparent distress   HEENT: Pupils equal and reactive to light and accomodation, EOMI intact; neck supple no raised JVD or rigidity    Oral cavity: Moist mucosa   Cardiovascular: Normal s1 s2,  regular rate and rhythm, no murmur   Lungs: B/l clear to auscultation, no wheezes or crepitations   Abdomen: Soft, nt, nd, no guarding, rigidity or rebound; BS +; abdominal scar from prior surgery present   LE : No edema   Musculoskeletal: Power 5/5 in all extremities   Neuro: No focal neurological deficits noted, CN II to XII grossly intact   Psychiatry: normal mood and affect  Skin: No obvious skin rashes or ulcers             Data:   All new lab and imaging data was reviewed in Epic.   Significant labs and imagings include:    ECHO 9/27/23:  Moderate, apical posterior pericardial effusion without signs of ventricular  interdependence/tamponade.  Normal inferior vena cava.  Normal right ventricular size and systolic function.  Normal left ventricular size and systolic function.  The visual ejection fraction is 60-65%.  No regional wall motion abnormalities noted.  Mild aortic valve stenosis.  Trace mitral and tricuspid valve regurgitation.     Compared to the recent study dated 8/29/2023, the pericardial effusion appears  larger.             Carl Green MD  Hospitalist

## 2023-09-28 ENCOUNTER — TELEPHONE (OUTPATIENT)
Dept: GASTROENTEROLOGY | Facility: CLINIC | Age: 58
End: 2023-09-28

## 2023-09-28 DIAGNOSIS — Z94.4 LIVER TRANSPLANTED (H): ICD-10-CM

## 2023-09-28 PROCEDURE — 210N000002 HC R&B HEART CARE

## 2023-09-28 PROCEDURE — 99223 1ST HOSP IP/OBS HIGH 75: CPT | Performed by: INTERNAL MEDICINE

## 2023-09-28 PROCEDURE — 99222 1ST HOSP IP/OBS MODERATE 55: CPT | Performed by: STUDENT IN AN ORGANIZED HEALTH CARE EDUCATION/TRAINING PROGRAM

## 2023-09-28 PROCEDURE — 250N000013 HC RX MED GY IP 250 OP 250 PS 637: Performed by: HOSPITALIST

## 2023-09-28 PROCEDURE — 250N000012 HC RX MED GY IP 250 OP 636 PS 637: Performed by: HOSPITALIST

## 2023-09-28 PROCEDURE — 99232 SBSQ HOSP IP/OBS MODERATE 35: CPT | Performed by: INTERNAL MEDICINE

## 2023-09-28 RX ORDER — TACROLIMUS 1 MG/1
1 CAPSULE ORAL EVERY 12 HOURS
Qty: 60 CAPSULE | Refills: 1 | Status: ON HOLD | OUTPATIENT
Start: 2023-09-28 | End: 2023-11-01

## 2023-09-28 RX ORDER — LIDOCAINE/PRILOCAINE 2.5 %-2.5%
CREAM (GRAM) TOPICAL
Status: DISCONTINUED | OUTPATIENT
Start: 2023-09-28 | End: 2023-10-01 | Stop reason: HOSPADM

## 2023-09-28 RX ADMIN — ACETAMINOPHEN 650 MG: 325 TABLET, FILM COATED ORAL at 21:01

## 2023-09-28 RX ADMIN — SEVELAMER CARBONATE 800 MG: 800 TABLET, FILM COATED ORAL at 10:36

## 2023-09-28 RX ADMIN — SEVELAMER CARBONATE 800 MG: 800 TABLET, FILM COATED ORAL at 18:18

## 2023-09-28 RX ADMIN — HYDROXYZINE HYDROCHLORIDE 25 MG: 25 TABLET, FILM COATED ORAL at 20:49

## 2023-09-28 RX ADMIN — HYDROXYZINE HYDROCHLORIDE 25 MG: 25 TABLET, FILM COATED ORAL at 10:37

## 2023-09-28 RX ADMIN — LACOSAMIDE 50 MG: 50 TABLET, FILM COATED ORAL at 08:37

## 2023-09-28 RX ADMIN — SENNOSIDES AND DOCUSATE SODIUM 1 TABLET: 50; 8.6 TABLET ORAL at 08:37

## 2023-09-28 RX ADMIN — TACROLIMUS 1 MG: 1 CAPSULE ORAL at 20:50

## 2023-09-28 RX ADMIN — SEVELAMER CARBONATE 800 MG: 800 TABLET, FILM COATED ORAL at 07:37

## 2023-09-28 RX ADMIN — LACOSAMIDE 100 MG: 100 TABLET, FILM COATED ORAL at 20:50

## 2023-09-28 RX ADMIN — SEVELAMER CARBONATE 800 MG: 800 TABLET, FILM COATED ORAL at 14:29

## 2023-09-28 RX ADMIN — GABAPENTIN 100 MG: 100 CAPSULE ORAL at 20:50

## 2023-09-28 RX ADMIN — ACETAMINOPHEN 650 MG: 325 TABLET, FILM COATED ORAL at 02:13

## 2023-09-28 RX ADMIN — TACROLIMUS 1 MG: 1 CAPSULE ORAL at 10:36

## 2023-09-28 ASSESSMENT — ACTIVITIES OF DAILY LIVING (ADL)
ADLS_ACUITY_SCORE: 22
ADLS_ACUITY_SCORE: 21
ADLS_ACUITY_SCORE: 21
ADLS_ACUITY_SCORE: 22
ADLS_ACUITY_SCORE: 21
ADLS_ACUITY_SCORE: 21
DEPENDENT_IADLS:: INDEPENDENT
ADLS_ACUITY_SCORE: 21
ADLS_ACUITY_SCORE: 22
ADLS_ACUITY_SCORE: 21
ADLS_ACUITY_SCORE: 21

## 2023-09-28 NOTE — PHARMACY-ADMISSION MEDICATION HISTORY
Pharmacist Admission Medication History    Admission medication history is complete. The information provided in this note is only as accurate as the sources available at the time of the update.    Medication reconciliation/reorder completed by provider prior to medication history? No    Information Source(s): Patient, Family member, Clinic records, and CareEverywhere/SureScripts via in-person    Pertinent Information:   -When discussing lacosamide dose, Blanco and his wife inquired about needing antiepileptic treatment lifelong as his one seizure in his life seemed circumstantial while he was critically ill.   -Confirmed patient is no longer taking levetiracetam or folic acid.   -Not taking pantoprazole after reading about long-term side effects. No GERD issues identified upon discontinuation.     Changes made to PTA medication list:  Added: ropinirole - uses PRN  Deleted: pantoprazole  Changed: lacosamide dosing - per pt/family report and neurology visit 9/5/23     Allergies reviewed with patient and updates made in EHR: yes    Medication History Completed By: Ebony Suarez RPH 9/27/2023 7:15 PM    Prior to Admission medications    Medication Sig Last Dose Taking? Auth Provider Long Term End Date   acetaminophen (TYLENOL) 325 MG tablet Take 2 tablets (650 mg) by mouth every 4 hours as needed for other (For optimal non-opioid multimodal pain management to improve pain control.)  at prn Yes Deejay Mcneil MD No    apixaban ANTICOAGULANT (ELIQUIS) 5 MG tablet Take 1 tablet (5 mg) by mouth 2 times daily for 90 days 9/27/2023 at 1100 Yes Ki Carter PA-C No 11/2/23   gabapentin (NEURONTIN) 100 MG capsule Take 1 capsule (100 mg) by mouth daily 9/26/2023 at pm (1st dose) Yes Ki Carter PA-C Yes    hydrOXYzine (ATARAX) 25 MG tablet Take 1 tablet (25 mg) by mouth 3 times daily as needed for itching  at prn Yes Ki Carter PA-C     Lacosamide (VIMPAT) 100 MG TABS tablet Take 1 tablet  (100 mg) by mouth every evening 9/26/2023 at pm Yes Reba Laughlin MD Yes    lacosamide (VIMPAT) 50 MG TABS tablet Take 50 mg by mouth every morning 9/27/2023 Yes Unknown, Entered By History Yes    melatonin 3 MG tablet Take 1 tablet (3 mg) by mouth At Bedtime 9/26/2023 Yes Ki Carter PA-C     midodrine (PROAMATINE) 10 MG tablet Take 1 tab 3xDay, during patient's dialysis days , Monday, Wednesday, Friday, patient takes 2 extra Midodrine one hour before dialysis, takes 1 more Midodrine when he gets there, and takes 1 more tablet during dialysis, this is an addition to patient's scheduled doses 9/27/2023 Yes Lanny Daly MD No    multivitamin RENAL (TRIPHROCAPS) 1 capsule capsule Take 1 capsule by mouth daily 9/27/2023 Yes Ki Carter PA-C     oxyCODONE (ROXICODONE) 5 MG tablet Take 1 tablet (5 mg) by mouth every 4 hours as needed for moderate pain  at prn Yes Deejay Mcneil MD No    rOPINIRole (REQUIP) 0.5 MG tablet Take 0.5 mg by mouth 2 times daily as needed (restless legs)  at prn Yes Unknown, Entered By History No    sevelamer carbonate (RENVELA) 800 MG tablet Take 800 mg by mouth 4 times daily With meals and snacks 9/27/2023 at pm Yes Reported, Patient     tacrolimus (GENERIC EQUIVALENT) 1 MG capsule Take 1 capsule (1 mg) by mouth every 12 hours 9/27/2023 at 1100 Yes Ki Carter PA-C No

## 2023-09-28 NOTE — TELEPHONE ENCOUNTER
Pre Assessment RN Review    Focused Assessments    Cardiac Review    Patient has hx of hospitalization for a cardiac event/procedure in the last 6 months. Contacted PCP / ordering provider to discuss appropriateness of procedure and recommended delay of procedure for at least 6 months. Appropriateness of procedure will be reevaluated according to provider recommendation.      Scheduling Status & Recommendations    Location Type: Hospital - Per exclusion criteria.Pt is currently on Dialysis but also admitted to the hospital for pericardial effusion. Message sent to Dr. Vasquez.    Elodia Stroud RN on 9/28/2023 at 1:47 PM

## 2023-09-28 NOTE — PROVIDER NOTIFICATION
"Dr Moeller was paged at 16:25    \"FYI: 5 beats of vtach around 1600. Asymptomatic. Also, he is requesting 2.5& lidocaine cream for his fistula site one hour prior to dialysis tomorrow.\"  "

## 2023-09-28 NOTE — PROGRESS NOTES
[facility]    Hospitalist Progress Note    Brief Summary:  Blanco Osborne is a 58 year old male with extensive PMH including h/o liver transplant 2016 due to alcohol abuse, pAF (on chronic anticoagulation), history of diabetes mellitus type 2, CVA, hypertension, CHRISTIAN on BiPAP, h/o respiratory failure with parainfluenza and  strep pneumoniae with parapneumonic pleural effusion, h/o pericardial effusion who was sent from cardiology clinic as a direct admission for worsening pericardial effusion, admitted inpatient 9/27/23.     He has history of prolonged hospitalization early 2023. 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  He was recovering in LTACH and then had a prolonged hospital stay at [facility] (1/21/23 to 4/28/23) with loculated pleural effusion.     Assessment & Plan        Worsening pericardial effusion  -sent from cardiology clinic; ECHO 9/27 noted with moderate pericardial effusion but with no tamponade physiology, EF 60-65 %  -currently hemodynamically stable; not hypoxic  -Neurology is consulted and patient scheduled for pericardiocentesis today.  -Overall remained stable at this time.  Send fluid for analysis     H/o prolonged hospitalization with respiratory failure/ARDS following parainfluenza and strep pneumoniae with b/l pleural effusion, respiratory failure requiring tracheostomy-- now resolved     ESRD on hemodialysis  H/o Left AV fistula pseudoaneurysm repair (8/2023 by vascular surgery)  -gets Monday, Wednesday, Friday dialysis; dialyzed 9/27/23  -has left upper extremity AV fistula; also right chest dialysis catheter  Neurology consulted and scheduled for dialysis tomorrow     Chronic liver disease, history of liver transplant 2016  H/o SBP  -LFTs stable at this time  -will  resume PTA tacrolimus when verified by pharmacy     History of seizure, on Keppra and Vimpat, resume when verified     Paroxysmal on atrial fibrillation  History of embolic strokes  H/o PEA arrest  H/o Chronic hypotension  -currently in sinus rhythm  -Holding Eliquis for procedure resume it after the procedure  -Use midodrine on the day of dialysis, blood pressure stable at this time     GERD: resume PTA Protonix when verified     Clinically Significant Risk Factors Present on Admission                # Drug Induced Coagulation Defect: home medication list includes an anticoagulant medication    # Hypertension: Noted on problem list      # Overweight: Estimated body mass index is 26.45 kg/m  as calculated from the following:    Height as of 9/21/23: 1.829 m (6').    Weight as of 9/21/23: 88.5 kg (195 lb).               DVT prophylaxis: Resume Eliquis after cardiocentesis  Code Status: Full Code    Disposition: Expected discharge in 1 to 2 days once remained stable    Juan Moeller MD, MD  Text Page  (7am - 6pm)    Interval History   Patient seen and evaluated in his room today denies any fever chills chest pain headache dizziness lightheadedness, does get dizzy when his blood pressure dropped.  He uses midodrine on days of dialysis.  Rest of the review of system negative    No other significant event overnight    -Data reviewed today: I reviewed all new labs and imaging results over the last 24 hours. I personally reviewed no images or EKG's today.    Physical Exam   Temp: 98  F (36.7  C) Temp src: Oral BP: 101/56 Pulse: 60   Resp: 20 SpO2: 97 % O2 Device: None (Room air)    Vitals:    09/27/23 1737 09/28/23 0608   Weight: 86.6 kg (190 lb 14.4 oz) 86.5 kg (190 lb 11.2 oz)     Vital Signs with Ranges  Temp:  [98  F (36.7  C)-98.4  F (36.9  C)] 98  F (36.7  C)  Pulse:  [57-66] 60  Resp:  [18-20] 20  BP: ()/(52-77) 101/56  SpO2:  [95 %-97 %] 97 %  No intake/output data recorded.    Constitutional: awake, alert,  cooperative, no apparent distress, and appears stated age  Eyes: Lids and lashes normal, pupils equal, round and reactive to light, extra ocular muscles intact, sclera clear, conjunctiva normal  Respiratory: No increased work of breathing, good air exchange, clear to auscultation bilaterally, no crackles or wheezing  Cardiovascular: Normal apical impulse, regular rate and rhythm, normal S1 and S2, no S3 or S4, and no murmur noted  GI: No scars, normal bowel sounds, soft, non-distended, non-tender, no masses palpated, no hepatosplenomegally  Musculoskeletal: no lower extremity pitting edema present  Neurologic: No focal deficit    Medications      - MEDICATION INSTRUCTIONS for Dialysis Patients -   Does not apply See Admin Instructions    [Held by provider] apixaban ANTICOAGULANT  5 mg Oral BID    gabapentin  100 mg Oral QPM    Lacosamide  100 mg Oral At Bedtime    lacosamide  50 mg Oral QAM    [START ON 9/29/2023] midodrine  10 mg Oral 3 times per day on Mon Wed Fri    senna-docusate  1 tablet Oral BID    Or    senna-docusate  2 tablet Oral BID    sevelamer carbonate  800 mg Oral 4x Daily    tacrolimus  1 mg Oral Q12H       Data   Recent Labs   Lab 09/27/23  1750   WBC 4.3   HGB 9.5*   MCV 97   *      POTASSIUM 4.2   CHLORIDE 95*   CO2 30*   BUN 60.0*   CR 6.27*   ANIONGAP 14   KELLY 9.5   GLC 96   ALBUMIN 4.3   PROTTOTAL 8.0   BILITOTAL 0.5   ALKPHOS 201*   ALT 14   AST 28       No results found for this or any previous visit (from the past 24 hour(s)).

## 2023-09-28 NOTE — TELEPHONE ENCOUNTER
Stephen Vasquez MD Faust, Jennifer L, RN  Sounds like they are going to drain it during this hospitalization so he should be good to go    Message sent to scheduling.    Elodia Stroud RN on 9/28/2023 at 2:15 PM

## 2023-09-28 NOTE — PLAN OF CARE
Goal Outcome Evaluation:      Plan of Care Reviewed With: patient    Overall Patient Progress: no change    VSS on RA. Tele SR. A/O x 4. Up independently in room. Denies COREEN, CP. Regular diet, NPO @ 0000. Anuric, HD MWF. RPIV SL. Rt chest HD cath heparin locked. LUE fistula, limb alert in place. Cardiology, nephrology consults. Plan for pericardial synthesis today.

## 2023-09-28 NOTE — CONSULTS
Care Management Initial Consult    General Information  Assessment completed with: Patient, Spouse or significant other, Ofe Siemon  Type of CM/SW Visit: Initial Assessment    Primary Care Provider verified and updated as needed: Yes   Readmission within the last 30 days: no previous admission in last 30 days      Reason for Consult: discharge planning  Advance Care Planning: Advance Care Planning Reviewed: no concerns identified (no document)          Communication Assessment  Patient's communication style: spoken language (English or Bilingual)    Hearing Difficulty or Deaf: no   Wear Glasses or Blind: yes    Cognitive  Cognitive/Neuro/Behavioral: WDL                      Living Environment:   People in home: spouse  Blanco Thakur  Current living Arrangements: condominium      Able to return to prior arrangements: yes  Living Arrangement Comments: anticipate return home    Family/Social Support:  Care provided by: self  Provides care for: no one  Marital Status:   Wife, Sibling(s)  Ofe       Description of Support System: Supportive, Involved    Support Assessment: Adequate family and caregiver support, Adequate social supports    Current Resources:   Patient receiving home care services: No     Community Resources: Dialysis Services  Equipment currently used at home: none  Supplies currently used at home: None    Employment/Financial:  Employment Status: other (see comments) (Self employed- . Tries to work)     Employment/ Comments: also gets Social Security  Financial Concerns: No concerns identified   Referral to Financial Worker: No  Finance Comments: [employer] Medicare Advantage    Does the patient's insurance plan have a 3 day qualifying hospital stay waiver?  No    Lifestyle & Psychosocial Needs:  Social Determinants of Health     Food Insecurity: Not on file   Depression: Not at risk (7/11/2023)    PHQ-2     PHQ-2 Score: 0   Housing Stability: Not on file   Tobacco  "Use: Low Risk  (9/21/2023)    Patient History     Smoking Tobacco Use: Never     Smokeless Tobacco Use: Never     Passive Exposure: Not on file   Financial Resource Strain: Not on file   Alcohol Use: Unknown (10/7/2019)    AUDIT-C     Frequency of Alcohol Consumption: Monthly or less     Average Number of Drinks: Not on file     Frequency of Binge Drinking: Not on file   Transportation Needs: Not on file   Physical Activity: Not on file   Interpersonal Safety: Low Risk  (9/21/2023)    Interpersonal Safety     Do you feel physically and emotionally safe where you currently live?: Yes     Within the past 12 months, have you been hit, slapped, kicked or otherwise physically hurt by someone?: No     Within the past 12 months, have you been humiliated or emotionally abused in other ways by your partner or ex-partner?: No   Stress: Not on file   Social Connections: Not on file       Functional Status:  Prior to admission patient needed assistance:   Dependent ADLs:: Independent  Dependent IADLs:: Independent  Assesssment of Functional Status: At functional baseline    Mental Health Status:  Mental Health Status: No Current Concerns       Chemical Dependency Status:  Chemical Dependency Status: past concern. Per H&P \"history of liver transport 2016 due to alcohol abuse\"          Values/Beliefs:  Spiritual, Cultural Beliefs, Gnosticist Practices, Values that affect care: no               Additional Information:  Per care management consult for Elevated Risk/Discharge planning, chart was reviewed.Patient is a pleasant 58 year old man that has a complicated medical history. Per H& P \"He has history of prolonged hospitalization early 2023. 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  He was recovering in LTACH and then had a " "prolonged hospital stay at [facility] (1/21/23 to 4/28/23) with loculated pleural effusion. h/o liver transplant 2016 due to alcohol abuse, pAF (on chronic anticoagulation), history of diabetes mellitus type 2, CVA, hypertension, CHRISTIAN on BiPAP, h/o respiratory failure with parainfluenza and  strep pneumoniae with parapneumonic pleural effusion, h/o pericardial effusion who was sent from cardiology clinic as a direct admission for worsening pericardial effusion, admitted inpatient 9/27/23.\" Patient's discharge date is unknown at this time.     Writer met with patient to complete consult. Patient reports at baseline patient and spouse, Ofe live in a Condo in [place]. Patient reports there is elevator access. Patient states he is independent with ADL's and IADL's. Patient confirmed he gets dialysis at the [facility] Patient reports he takes himself to dialysis. Patient reports his support system is his wife and brother, Dylan. Patient explained that they are available to assist at discharge is needed. Spouse works a few days a week outside of the home. Patient explains that \"I have been trying to work at times\". 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 At this patient, patient denied any financial concerns or any concerns at this time relating to discharge. Care management will continue to follow for any needs that may arise.     Anel Irving, JAMISON, [facility]   Social Work   [facility]   "

## 2023-09-28 NOTE — PLAN OF CARE
Goal Outcome Evaluation:    Pt here with pericardial effusion. Plan for percardiocentesis tomorrow. Asymptomatic. A&Ox4. VSS on RA, soft bps. Tele SR with AV block. Regular diet, thin liquids. Takes pills whole. NPO at midnight. Up ind. Denies pain. L arm fistula, R internal jugular. Anuria d/t dialysis. Plan for dialysis tomorrow at some point, dialyzes MWF. Would like lido cream on fistula one hour before, order was put in.

## 2023-09-28 NOTE — TELEPHONE ENCOUNTER
"Endoscopy Scheduling Screen    Have you had a positive Covid test in the last 14 days?  No    Are you active on MyChart?   Yes    What insurance is in the chart?  Other:  [facility]    Ordering/Referring Provider:    (If ordering provider performs procedure, schedule with ordering provider unless otherwise instructed. )    BMI: Estimated body mass index is 25.86 kg/m  as calculated from the following:    Height as of 9/27/23: 1.829 m (6').    Weight as of an earlier encounter on 9/28/23: 86.5 kg (190 lb 11.2 oz).     Sedation Ordered  MAC/deep sedation.   BMI<= 45 45 < BMI <= 48 48 < BMI < = 50  BMI > 50   No Restrictions No [facility]  No [facility]  [facility] with exceptions Hospital Only OR Only       Are you taking any prescription medications for pain 3 or more times per week?   No    Do you have a history of malignant hyperthermia or adverse reaction to anesthesia?  No    (Females) Are you currently pregnant?   No     Have you been diagnosed or told you have pulmonary hypertension?   No    Do you have an LVAD?  No    Have you been told you have moderate to severe sleep apnea?  No    Have you been told you have COPD, asthma, or any other lung disease?  No    Do you have any heart conditions?  Yes     In the past 6 months, have you had any hospitalizations for heart related issues including cardiomyopathy, heart attack, or stent placement?  Yes (RN Review required for scheduling.)    Do you have any implantable devices in your body (pacemaker, ICD)?  No    Do you take nitroglycerine?  No    Have you ever had an organ transplant?   Yes - No [facility] 7 years ago     Have you ever had or are you awaiting a heart or lung transplant?   No    Have you had a stroke or transient ischemic attack (TIA aka \"mini stroke\" in the last 6 months?   No    Have you been diagnosed with or been told you have cirrhosis of the liver?   No    Are you currently on dialysis?   Yes (Hospital Only)    Do you need assistance " transferring?   No    BMI: Estimated body mass index is 25.86 kg/m  as calculated from the following:    Height as of 9/27/23: 1.829 m (6').    Weight as of an earlier encounter on 9/28/23: 86.5 kg (190 lb 11.2 oz).     Is patients BMI > 40 and scheduling location [facility]?  No    Do you take an injectable medication for weight loss or diabetes (excluding insulin)?  No    Do you take the medication Naltrexone?  No    Do you take blood thinners?  Yes     Are you taking Effient/Prasugrel?  No, you must contact your prescribing provider for direction on holding or bridging with a different medication.       Prep   Are you currently on dialysis or do you have chronic kidney disease?  Yes (Golyt[facility] Prep)    Do you have a diagnosis of diabetes?  No    Do you have a diagnosis of cystic fibrosis (CF)?  No    On a regular basis do you go 3 -5 days between bowel movements?  No    BMI > 40?  No    Preferred Pharmacy:        [id number] - [place] - [address] [facility] [address]  [address] [id number]  Phone: [phone] Fax: [phone]      After submitting pt for nurse review pt will need approval [facility]  scheduling. Pt is currently in the hospital for heart related issues.

## 2023-09-28 NOTE — CONSULTS
[facility]    Cardiology Consultation     Date of Admission:  9/27/2023    Assessment & Plan   Blanco Osborne is a 58 year old male who was admitted on 9/27/2023.      Moderate to large pericardial effusion, worsening.  Agree with the pericardiocentesis.  Discussed in detail with patient about the procedure, potential complications.  He understands the rational of pericardiocentesis and wishes to proceed with it.  Keep NPO.  Continue to withhold apixaban.  Paroxysmal atrial fibrillation on apixaban which has been withheld for the pericardiocentesis  End-stage renal disease on hemodialysis  S/p liver transplant with cirrhosis and chronic thrombocytopenia  Low blood pressure on chronic midodrine with dialysis Monday Wednesday and Friday    Recommendations  Pericardiocentesis  Continue to withhold apixaban  Continue n.p.o.        High complexity     Vidal Guzman MD, MD    Primary Care Physician   Ki Carter    Reason for Consult   Reason for consult: I was asked to evaluate this patient for worsening pericardial effusion.        History of Present Illness   Blanco Osborne is a 58 year old male who presents with worsening pericardial effusion. Patient has history of pericardial effusion which is slowly progressed and had an outpatient echocardiogram yesterday that showed moderate to large pericardial fusion oh the apex without any evidence of tamponade.  He was admitted from the clinic for pericardiocentesis.  Patient has end-stage renal disease on hemodialysis Monday Wednesday Friday.  Last year he had a prolonged hospitalization due to PEA cardiac arrest and respiratory failure and ended up having end-stage renal disease.  He has history of liver transplant 2016 now with cirrhosis and chronic thrombocytopenia.  He is also on chronic midodrine to support blood pressure in the setting of end-stage renal disease on dialysis.  He has paroxysmal atrial fibrillation and is on apixaban  which has been withheld in anticipation of pericardiocentesis..  In addition to pericardial effusion echocardiogram showed normal LV systolic function mild aortic valve stenosis, normal LV systolic function.  He denies any chest discomfort or shortness of breath.    Past Medical History   Past Medical History:   Diagnosis Date    Acquired immunocompromised state (H) 11/19/2022    Acute renal failure, unspecified acute renal failure type (H) 11/12/2022    Antiplatelet or antithrombotic long-term use     Arrhythmia     PEA    Ascites     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    Cancer (H) 2023    Squamous Cell Cancer- Since resolved    Cirrhosis of liver with ascites (H) 02/11/2016    Coagulopathy (H)     Critical illness myopathy     Dialysis patient (H)     DIALYSIS 3X/ WEEK    Encephalopathy     ESRD (end stage renal disease) on dialysis (H)     Gastroesophageal reflux disease     H/O alcohol abuse     History of blood transfusion     Hyperammonemia (H)     Hyperlipidemia     Hypertension     Patients states that HTN has been resolved    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    Insomnia     Lactic acidosis 11/12/2022    Liver replaced by transplant (H) 09/20/2016    NAFLD (nonalcoholic fatty liver disease) 02/11/2016    CHRISTIAN on CPAP     Parainfluenza type 1 infection 11/19/2022    Parapneumonic effusion     Pleural effusion     Pneumothorax on left 12/16/2022    Respiratory acidosis 11/12/2022    Respiratory arrest (H) 11/12/2022    Restless legs syndrome (RLS)     SBP (spontaneous bacterial peritonitis) (H)     [facility]I    Seizures (H) 12/2022    Sepsis due to Streptococcus pneumoniae with acute hypoxic respiratory failure (H) 11/19/2022    Stroke, embolic (H) 11/20/2022    Sudden cardiac arrest (H) 12/29/2022    PEA- 2 min of CPR    Tubular adenoma 10/2019    Large cecal adenoma- due for surveillance colonoscopy in 3 years (10/2022)         Past Surgical History   Past Surgical History:   Procedure  Laterality Date    APPENDECTOMY      BENCH LIVER N/A 2016    Procedure: BENCH LIVER;  Surgeon: Enoc Crews MD;  Location: [facility] OR    BRONCHOSCOPY FLEXIBLE AND RIGID N/A 2022    Procedure: BRONCHOSCOPY;  Surgeon: Alena Valenzuela MD;  Location:  GI    COLONOSCOPY  2013    repeat in 2018    COLONOSCOPY N/A 10/04/2019    Procedure: COLONOSCOPY, WITH POLYPECTOMY AND BIOPSY;  Surgeon: Go Chong MD;  Location: [facility] OR    CREATE FISTULA ARTERIOVENOUS UPPER EXTREMITY Left 2023    Procedure: LEFT UPPER EXTREMITY ARTERIOVENOUS FISTULA CREATION. LIGATION OF COMPETING VASCULAR BRANCHES- LEFT;  Surgeon: Deejay Mcneil MD;  Location: [facility] OR    HERNIA REPAIR      IR CHEST TUBE PLACEMENT NON-TUNNELED RIGHT  2022    IR CVC TUNNEL PLACEMENT > 5 YRS OF AGE  2022    IR DIALYSIS FISTULOGRAM LEFT  2023    IR GASTROSTOMY TUBE CHANGE  2023    IR GASTROSTOMY TUBE PERCUTANEOUS PLCMNT  2022    IR PARACENTESIS  2022    IR PARACENTESIS  2022    IR THORACENTESIS  2022    IR THORACENTESIS  2020    IR THORACENTESIS  08/10/2020    IR TRANSCATHETER BIOPSY  2022    REVISION FISTULA ARTERIOVENOUS UPPER EXTREMITY Left 8/15/2023    Procedure: 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 2; OUTFLOW REVISION CEPHALIC TO BRACHIAL VEIN;  Surgeon: Deejay Mcneil MD;  Location:  OR    TRACHEOSTOMY N/A 2022    Procedure: TRACHEOSTOMY;  Surgeon: Nilesh Jackson MD;  Location:  OR    TRANSPLANT LIVER RECIPIENT  DONOR N/A 2016    Procedure: TRANSPLANT LIVER RECIPIENT  DONOR;  Surgeon: Enoc Crews MD;  Location: [facility] OR         Prior to Admission Medications   Prior to Admission Medications   Prescriptions Last Dose Informant Patient Reported? Taking?   Lacosamide (VIMPAT) 100 MG TABS tablet 2023 at pm  No Yes   Sig: Take 1 tablet (100 mg) by mouth every evening   acetaminophen (TYLENOL)  325 MG tablet  at prn  No Yes   Sig: Take 2 tablets (650 mg) by mouth every 4 hours as needed for other (For optimal non-opioid multimodal pain management to improve pain control.)   apixaban ANTICOAGULANT (ELIQUIS) 5 MG tablet 9/27/2023 at 1100  No Yes   Sig: Take 1 tablet (5 mg) by mouth 2 times daily for 90 days   gabapentin (NEURONTIN) 100 MG capsule 9/26/2023 at pm (1st dose)  No Yes   Sig: Take 1 capsule (100 mg) by mouth daily   hydrOXYzine (ATARAX) 25 MG tablet  at prn  No Yes   Sig: Take 1 tablet (25 mg) by mouth 3 times daily as needed for itching   lacosamide (VIMPAT) 50 MG TABS tablet 9/27/2023  Yes Yes   Sig: Take 50 mg by mouth every morning   melatonin 3 MG tablet 9/26/2023  No Yes   Sig: Take 1 tablet (3 mg) by mouth At Bedtime   midodrine (PROAMATINE) 10 MG tablet 9/27/2023  No Yes   Sig: Take 1 tab 3xDay, during patient's dialysis days , Monday, Wednesday, Friday, patient takes 2 extra Midodrine one hour before dialysis, takes 1 more Midodrine when he gets there, and takes 1 more tablet during dialysis, this is an addition to patient's scheduled doses   multivitamin RENAL (TRIPHROCAPS) 1 capsule capsule 9/27/2023  No Yes   Sig: Take 1 capsule by mouth daily   oxyCODONE (ROXICODONE) 5 MG tablet  at prn  No Yes   Sig: Take 1 tablet (5 mg) by mouth every 4 hours as needed for moderate pain   rOPINIRole (REQUIP) 0.5 MG tablet  at prn  Yes Yes   Sig: Take 0.5 mg by mouth 2 times daily as needed (restless legs)   sevelamer carbonate (RENVELA) 800 MG tablet 9/27/2023 at pm  Yes Yes   Sig: Take 800 mg by mouth 4 times daily With meals and snacks   tacrolimus (GENERIC EQUIVALENT) 1 MG capsule 9/27/2023 at 1100  No Yes   Sig: Take 1 capsule (1 mg) by mouth every 12 hours      Facility-Administered Medications: None     Current Facility-Administered Medications   Medication Dose Route Frequency    - MEDICATION INSTRUCTIONS for Dialysis Patients -   Does not apply See Admin Instructions    [Held by provider]  apixaban ANTICOAGULANT  5 mg Oral BID    gabapentin  100 mg Oral QPM    Lacosamide  100 mg Oral At Bedtime    lacosamide  50 mg Oral QAM    [START ON 9/29/2023] midodrine  10 mg Oral 3 times per day on Mon Wed Fri    senna-docusate  1 tablet Oral BID    Or    senna-docusate  2 tablet Oral BID    sevelamer carbonate  800 mg Oral 4x Daily    tacrolimus  1 mg Oral Q12H     Current Facility-Administered Medications   Medication Last Rate     Allergies   No Known Allergies    Social History    reports that he has never smoked. He has never used smokeless tobacco. He reports that he does not currently use alcohol. He reports that he does not use drugs.      Family History            Review of Systems   A comprehensive review of system was performed and is negative other than that noted in the HPI or here.     Physical Exam   Vital Signs with Ranges  Temp:  [98  F (36.7  C)-98.4  F (36.9  C)] 98.4  F (36.9  C)  Pulse:  [57-66] 60  Resp:  [18-20] 20  BP: ()/(52-77) 91/59  SpO2:  [95 %-97 %] 97 %  Wt Readings from Last 4 Encounters:   09/28/23 86.5 kg (190 lb 11.2 oz)   09/21/23 88.5 kg (195 lb)   09/11/23 86.6 kg (191 lb)   09/05/23 85.7 kg (189 lb)     No intake/output data recorded.      Vitals: BP 91/59 (BP Location: Right arm)   Pulse 60   Temp 98.4  F (36.9  C) (Oral)   Resp 20   Ht 1.829 m (6')   Wt 86.5 kg (190 lb 11.2 oz)   SpO2 97%   BMI 25.86 kg/m      Physical Exam:   General - Alert and oriented to time place and person in no acute distress  Eyes - No scleral icterus  HEENT - Neck supple, moist mucous membranes  Cardiovascular -S1-S2 normal grade 2 x 6 ejection systolic murmur over the right upper sternal border, no S3-S4 rub or gallop  Extremities - There is no pitting pedal edema  Respiratory -clear to auscultation  Skin - No pallor or cyanosis  Gastrointestinal - Non tender and non distended without rebound or guarding  Psych - Appropriate affect   Neurological - No gross motor neurological focal  deficits    No lab results found in last 7 days.    Invalid input(s): TROPONINIES    Recent Labs   Lab 23  1750   WBC 4.3   HGB 9.5*   MCV 97   *      POTASSIUM 4.2   CHLORIDE 95*   CO2 30*   BUN 60.0*   CR 6.27*   GFRESTIMATED 10*   ANIONGAP 14   KELLY 9.5   GLC 96   ALBUMIN 4.3   PROTTOTAL 8.0   BILITOTAL 0.5   ALKPHOS 201*   ALT 14   AST 28     Recent Labs   Lab Test 22  1315 22  1151   CHOL 89 121   HDL 27* 22*   LDL 41 80   TRIG 106 94     Recent Labs   Lab 23  1750   WBC 4.3   HGB 9.5*   HCT 29.7*   MCV 97   *     No results for input(s): PH, PHV, PO2, PO2V, SAT, PCO2, PCO2V, HCO3, HCO3V in the last 168 hours.  No results for input(s): NTBNPI, NTBNP in the last 168 hours.  No results for input(s): DD in the last 168 hours.  No results for input(s): SED, CRP in the last 168 hours.  Recent Labs   Lab 23  1750   *     No results for input(s): TSH in the last 168 hours.  No results for input(s): COLOR, APPEARANCE, URINEGLC, URINEBILI, URINEKETONE, SG, UBLD, URINEPH, PROTEIN, UROBILINOGEN, NITRITE, LEUKEST, RBCU, WBCU in the last 168 hours.    Imaging:  Recent Results (from the past 48 hour(s))   Echocardiogram Limited   Result Value    LVEF  60-65%    Narrative    [id number]  [id number]  [id number]  [id number]^ULISES     [facility]  [facility]  Echocardiography Laboratory  [address] [facility]  [address]  [place] [address]  Phone [phone]  Fax [phone]     Name: MARY JO CRAIN  MRN: [id number]  : 1965  Study Date: 2023 12:50 PM  Age: 58 yrs  Gender: Male  Patient Location: [facility]  Reason For Study: Pericardial effusion  Ordering Physician: ULISES JACOBSEN  Referring Physician: ULISES JACOBSEN  Performed By: Christel Jones     BSA: 2.1 m2  Height: 72 in  Weight: 195 lb  HR: 59  BP: 119/72  mmHg  ______________________________________________________________________________  Procedure  Limited Echo Adult.     ______________________________________________________________________________  Interpretation Summary     Moderate, apical posterior pericardial effusion without signs of ventricular  interdependence/tamponade.  Normal inferior vena cava.  Normal right ventricular size and systolic function.  Normal left ventricular size and systolic function.  The visual ejection fraction is 60-65%.  No regional wall motion abnormalities noted.  Mild aortic valve stenosis.  Trace mitral and tricuspid valve regurgitation.     Compared to the recent study dated 8/29/2023, the pericardial effusion appears  larger.  I personally visited with the patient. He is asymptomatic, had dialysis  earlier today, hemodynamically and rhythmically stable.  However, given the temporal progression the size of the pericardial effusion,  elective direct hospital admission for pericardiocentesis recommended.  Please see EMR for details.     ______________________________________________________________________________  Left Ventricle  The left ventricle is normal in size. There is normal left ventricular wall  thickness. Left ventricular systolic function is normal. The visual ejection  fraction is 60-65%. Left ventricular diastolic function is indeterminate. No  regional wall motion abnormalities noted.     Right Ventricle  The right ventricle is normal in size and function.     Atria  Normal left atrial size. Right atrial size is normal. There is no atrial shunt  seen.     Mitral Valve  The mitral valve leaflets appear normal. There is no evidence of stenosis,  fluttering, or prolapse. There is no mitral regurgitation noted. There is no  mitral valve stenosis.     Tricuspid Valve  Normal tricuspid valve. There is trace tricuspid regurgitation. Right  ventricular systolic pressure could not be approximated due to  inadequate  tricuspid regurgitation.     Aortic Valve  The aortic valve is trileaflet with aortic valve sclerosis. There is trace  aortic regurgitation. Mild valvular aortic stenosis. The mean AoV pressure  gradient is 14.3 mmHg. The calculated aortic valve are is 2.3 cm^2.     Pulmonic Valve  The pulmonic valve is not well visualized. There is trace pulmonic valvular  regurgitation.     Vessels  The aortic root is normal size. Normal size ascending aorta. The inferior vena  cava is normal.     Pericardium  Moderate pericardial effusion. There are no echocardiographic indications of  cardiac tamponade.     Rhythm  Sinus rhythm was noted.     ______________________________________________________________________________  MMode/2D Measurements & Calculations  IVSd: 0.81 cm  LVIDd: 5.4 cm  LVIDs: 3.0 cm  LVPWd: 0.81 cm  FS: 43.5 %  LV mass(C)d: 155.5 grams  LV mass(C)dI: 73.8 grams/m2  asc Aorta Diam: 3.8 cm     LVOT diam: 2.2 cm  LVOT area: 3.9 cm2  Asc Ao diam index BSA (cm/m2): 1.8  Asc Ao diam index Ht(cm/m): 2.1  RWT: 0.30     Doppler Measurements & Calculations  Ao V2 max: 258.2 cm/sec  Ao max P.0 mmHg  Ao V2 mean: 178.1 cm/sec  Ao mean P.3 mmHg  Ao V2 VTI: 56.2 cm  WOLFGANG(I,D): 2.3 cm2  WOLFGANG(V,D): 2.3 cm2  LV V1 max P.9 mmHg  LV V1 max: 149.0 cm/sec  LV V1 VTI: 32.7 cm  SV(LVOT): 127.6 ml  SI(LVOT): 60.6 ml/m2  PA acc time: 0.10 sec  AV Baljeet Ratio (DI): 0.58  WOLFGANG Index (cm2/m2): 1.1  RV S Baljeet: 17.1 cm/sec     ______________________________________________________________________________  Report approved by: Dr Kristina Jacob 2023 02:19 PM             Echo:  No results found for this or any previous visit (from the past 4320 hour(s)).    Clinically Significant Risk Factors Present on Admission               # Drug Induced Coagulation Defect: home medication list includes an anticoagulant medication  # Thrombocytopenia: Lowest platelets = 109 in last 2 days, will monitor for bleeding   #  Hypertension: Noted on problem list      # Overweight: Estimated body mass index is 25.86 kg/m  as calculated from the following:    Height as of this encounter: 1.829 m (6').    Weight as of this encounter: 86.5 kg (190 lb 11.2 oz).

## 2023-09-28 NOTE — CONSULTS
[facility]    Nephrology Consultation     Date of Admission:  9/27/2023    Assessment & Plan     Blanco Osborne is a 58 year old male with PMH liver transplant, pAF, DM2, CVA, HTN, CHRISTIAN, ESRD on HD who was admitted on 9/27/2023 for worsening pericardial effusion.     Assessment:    Large pericardial effusion   Noted on TTE 9/27, will require pericardiocentesis. Cardiology consulted, holding eliquis.     ESRD on HD   MWF. Deirdre DOBSON . Dr. Augustine weems. EDW 85.5kg. 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 Still has [facility] TDC. Called his unit, they prefer us to use the catheter for his run here tomorrow. No heparin.   - HD tomorrow   - midodrine 1 hour before, immediately before and during run   - continue renal vitamin    CKD MBD   Continue PTA renvela.     Anemia of CKD   Hgb 9.5, will give epo with dialysis.     Plan/Recs:  1) HD tomorrow    Dwayne Laboy MD   [facility] - Nephrology  [phone]  --------------------------------------------------------------------------------------------  Reason for Consult     I was asked to see the patient for ESRD.    Primary Care Physician     Ki Carter    Chief Complaint     Pericardial effusion    History is obtained from the patient and chart review.      History of Present Illness     Blanco Osborne is a 58 year old male who presents for direct admission for pericardial effusion noted on TTE 9/27.     He reports feeling in his normal health. Denies any particular complaints. Denies SOB. Denies chest discomfort. Has not had an issues at dialysis recently. He notes that his fistula recently started to get used. He denies issues with it thus far. Still has catheter in place. His dialysis unit notes only smaller gauge needles used, not quite ready to remove catheter yet. They prefer we use the catheter during this admission.      Denies n/v. Appetite good. Living at home. Started doing some work as able.     Past Medical History   I have reviewed this patient's medical history and updated it with pertinent information if needed.   Past Medical History:   Diagnosis Date    Acquired immunocompromised state (H) 11/19/2022    Acute renal failure, unspecified acute renal failure type (H) 11/12/2022    Antiplatelet or antithrombotic long-term use     Arrhythmia     PEA    Ascites     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    Cancer (H) 2023    Squamous Cell Cancer- Since resolved    Cirrhosis of liver with ascites (H) 02/11/2016    Coagulopathy (H)     Critical illness myopathy     Dialysis patient (H)     DIALYSIS 3X/ WEEK    Encephalopathy     ESRD (end stage renal disease) on dialysis (H)     Gastroesophageal reflux disease     H/O alcohol abuse     History of blood transfusion     Hyperammonemia (H)     Hyperlipidemia     Hypertension     Patients states that HTN has been resolved    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    Insomnia     Lactic acidosis 11/12/2022    Liver replaced by transplant (H) 09/20/2016    NAFLD (nonalcoholic fatty liver disease) 02/11/2016    CHRISTIAN on CPAP     Parainfluenza type 1 infection 11/19/2022    Parapneumonic effusion     Pleural effusion     Pneumothorax on left 12/16/2022    Respiratory acidosis 11/12/2022    Respiratory arrest (H) 11/12/2022    Restless legs syndrome (RLS)     SBP (spontaneous bacterial peritonitis) (H)     MNGI    Seizures (H) 12/2022    Sepsis due to Streptococcus pneumoniae with acute hypoxic respiratory failure (H) 11/19/2022    Stroke, embolic (H) 11/20/2022    Sudden cardiac arrest (H) 12/29/2022    PEA- 2 min of CPR    Tubular adenoma 10/2019    Large cecal adenoma- due for surveillance colonoscopy in 3 years (10/2022)       Past Surgical History   I have reviewed this patient's surgical history and updated it with pertinent information if needed.  Past Surgical History:    Procedure Laterality Date    APPENDECTOMY      BENCH LIVER N/A 2016    Procedure: BENCH LIVER;  Surgeon: Enoc Crews MD;  Location: [facility] OR    BRONCHOSCOPY FLEXIBLE AND RIGID N/A 2022    Procedure: BRONCHOSCOPY;  Surgeon: Alena Valenzuela MD;  Location:  GI    COLONOSCOPY  2013    repeat in 2018    COLONOSCOPY N/A 10/04/2019    Procedure: COLONOSCOPY, WITH POLYPECTOMY AND BIOPSY;  Surgeon: Go Chong MD;  Location: [facility] OR    CREATE FISTULA ARTERIOVENOUS UPPER EXTREMITY Left 2023    Procedure: LEFT UPPER EXTREMITY ARTERIOVENOUS FISTULA CREATION. LIGATION OF COMPETING VASCULAR BRANCHES- LEFT;  Surgeon: Deejay Mcneil MD;  Location: [facility] OR    HERNIA REPAIR      IR CHEST TUBE PLACEMENT NON-TUNNELED RIGHT  2022    IR CVC TUNNEL PLACEMENT > 5 YRS OF AGE  2022    IR DIALYSIS FISTULOGRAM LEFT  2023    IR GASTROSTOMY TUBE CHANGE  2023    IR GASTROSTOMY TUBE PERCUTANEOUS PLCMNT  2022    IR PARACENTESIS  2022    IR PARACENTESIS  2022    IR THORACENTESIS  2022    IR THORACENTESIS  2020    IR THORACENTESIS  08/10/2020    IR TRANSCATHETER BIOPSY  2022    REVISION FISTULA ARTERIOVENOUS UPPER EXTREMITY Left 8/15/2023    Procedure: 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 2; OUTFLOW REVISION CEPHALIC TO BRACHIAL VEIN;  Surgeon: Deejay Mcneil MD;  Location: [facility] OR    TRACHEOSTOMY N/A 2022    Procedure: TRACHEOSTOMY;  Surgeon: Nilesh Jackson MD;  Location:  OR    TRANSPLANT LIVER RECIPIENT  DONOR N/A 2016    Procedure: TRANSPLANT LIVER RECIPIENT  DONOR;  Surgeon: Enoc Crews MD;  Location: [facility] OR       Prior to Admission Medications   Prior to Admission Medications   Prescriptions Last Dose Informant Patient Reported? Taking?   Lacosamide (VIMPAT) 100 MG TABS tablet 2023 at pm  No Yes   Sig: Take 1 tablet (100 mg) by mouth every evening   acetaminophen  (TYLENOL) 325 MG tablet  at prn  No Yes   Sig: Take 2 tablets (650 mg) by mouth every 4 hours as needed for other (For optimal non-opioid multimodal pain management to improve pain control.)   apixaban ANTICOAGULANT (ELIQUIS) 5 MG tablet 9/27/2023 at 1100  No Yes   Sig: Take 1 tablet (5 mg) by mouth 2 times daily for 90 days   gabapentin (NEURONTIN) 100 MG capsule 9/26/2023 at pm (1st dose)  No Yes   Sig: Take 1 capsule (100 mg) by mouth daily   hydrOXYzine (ATARAX) 25 MG tablet  at prn  No Yes   Sig: Take 1 tablet (25 mg) by mouth 3 times daily as needed for itching   lacosamide (VIMPAT) 50 MG TABS tablet 9/27/2023  Yes Yes   Sig: Take 50 mg by mouth every morning   melatonin 3 MG tablet 9/26/2023  No Yes   Sig: Take 1 tablet (3 mg) by mouth At Bedtime   midodrine (PROAMATINE) 10 MG tablet 9/27/2023  No Yes   Sig: Take 1 tab 3xDay, during patient's dialysis days , Monday, Wednesday, Friday, patient takes 2 extra Midodrine one hour before dialysis, takes 1 more Midodrine when he gets there, and takes 1 more tablet during dialysis, this is an addition to patient's scheduled doses   multivitamin RENAL (TRIPHROCAPS) 1 capsule capsule 9/27/2023  No Yes   Sig: Take 1 capsule by mouth daily   oxyCODONE (ROXICODONE) 5 MG tablet  at prn  No Yes   Sig: Take 1 tablet (5 mg) by mouth every 4 hours as needed for moderate pain   rOPINIRole (REQUIP) 0.5 MG tablet  at prn  Yes Yes   Sig: Take 0.5 mg by mouth 2 times daily as needed (restless legs)   sevelamer carbonate (RENVELA) 800 MG tablet 9/27/2023 at pm  Yes Yes   Sig: Take 800 mg by mouth 4 times daily With meals and snacks   tacrolimus (GENERIC EQUIVALENT) 1 MG capsule 9/27/2023 at 1100  No Yes   Sig: Take 1 capsule (1 mg) by mouth every 12 hours      Facility-Administered Medications: None     Allergies   No Known Allergies    Social History   I have reviewed this patient's social history and updated it with pertinent information if needed. Blanco Osborne  reports that  he has never smoked. He has never used smokeless tobacco. He reports that he does not currently use alcohol. He reports that he does not use drugs.    Family History   I have reviewed this patient's family history and updated it with pertinent information if needed.   Family History   Problem Relation Age of Onset    Coronary Artery Disease No family hx of     Cardiomyopathy No family hx of        Review of Systems   The 10 point Review of Systems is negative other than noted in the HPI.     Physical Exam   Temp: 98.4  F (36.9  C) Temp src: Oral BP: 110/74 Pulse: 60   Resp: 20 SpO2: 97 % O2 Device: None (Room air)    Vital Signs with Ranges  Temp:  [98  F (36.7  C)-98.4  F (36.9  C)] 98.4  F (36.9  C)  Pulse:  [57-66] 60  Resp:  [18-20] 20  BP: ()/(52-77) 110/74  SpO2:  [95 %-97 %] 97 %  190 lbs 11.2 oz    GENERAL: NAD, well appearing  HEENT:  Normocephalic. MMM  CV: RRR, systolic murmur  RESP: Clear anteriorly  GI: distended but nontender  MUSCULOSKELETAL: extremities nl - no gross deformities noted; no LE edema  SKIN: no suspicious lesions or rashes, dry to touch  NEURO:  Strength normal and symmetric. Alert, oriented, normal speech  PSYCH: mood good, affect appropriate  ACCESS: RIJ TDC clean and intact. LUE AVF with palpable thrill and audible bruit.     Data   BMP  Recent Labs   Lab 09/27/23  1750      POTASSIUM 4.2   CHLORIDE 95*   KELLY 9.5   CO2 30*   BUN 60.0*   CR 6.27*   GLC 96     Phos@[email or web address](phos:4)  CBC)  Recent Labs   Lab 09/27/23  1750   WBC 4.3   HGB 9.5*   HCT 29.7*   MCV 97   *     Recent Labs   Lab 09/27/23  1750   AST 28   ALT 14   ALKPHOS 201*   BILITOTAL 0.5     No lab results found in last 7 days.  25 OH Vit D2   Date Value Ref Range Status   09/24/2016 <5 ug/L Final     25 OH Vitamin D2   Date Value Ref Range Status   02/16/2023 <5 ug/L Final     25 OH Vit D3   Date Value Ref Range Status   09/24/2016 8 ug/L Final     25 OH Vitamin D3   Date Value Ref Range Status    02/16/2023 37 ug/L Final     25 OH Vit D total   Date Value Ref Range Status   09/24/2016 (L) 20 - 75 ug/L Final    <13  Season, race, dietary intake, and treatment affect the concentration of   25-hydroxy-Vitamin D. Values may decrease during winter months and increase   during summer months. Values 20-29 ug/L may indicate Vitamin D insufficiency   and values <20 ug/L may indicate Vitamin D deficiency.   This test was developed and its performance characteristics determined by the   [facility],  [facility]. It has   not been cleared or approved by the FDA. The laboratory is regulated under CLIA   as qualified to perform high-complexity testing. This test is used for clinical   purposes. It should not be regarded as investigational or for research.       25 OH Vit D Total   Date Value Ref Range Status   02/16/2023 <42 20 - 75 ug/L Final     Comment:     Season, race, dietary intake, and treatment affect the concentration of 25-hydroxy-Vitamin D. Values may decrease during winter months and increase during summer months. Values 20-29 ug/L may indicate Vitamin D insufficiency and values <20 ug/L may indicate Vitamin D deficiency.     Recent Labs   Lab 09/27/23  1750   HGB 9.5*   HCT 29.7*   MCV 97     No results for input(s): PTHI in the last 168 hours.  Color Urine (no units)   Date Value   11/25/2022 Yellow   10/31/2017 [place]     Appearance Urine (no units)   Date Value   11/25/2022 Slightly Cloudy (A)   10/31/2017 Slightly Cloudy     Glucose Urine (mg/dL)   Date Value   11/25/2022 Negative   10/31/2017 Negative     Bilirubin Urine (no units)   Date Value   11/25/2022 Negative   10/31/2017 Negative     Ketones Urine (mg/dL)   Date Value   11/25/2022 Negative   10/31/2017 5 (A)     Specific Gravity Urine (no units)   Date Value   11/25/2022 1.017   10/31/2017 1.021     pH Urine   Date Value   11/25/2022 5.5   10/31/2017 5.0 pH     Protein Albumin Urine (mg/dL)   Date  Value   11/25/2022 70 (A)   10/31/2017 Negative     Nitrite Urine (no units)   Date Value   11/25/2022 Negative   10/31/2017 Negative     Leukocyte Esterase Urine (no units)   Date Value   11/25/2022 Small (A)   10/31/2017 Negative           Dwayne Laboy MD   [facility] - Nephrology  [phone]

## 2023-09-29 ENCOUNTER — APPOINTMENT (OUTPATIENT)
Dept: CARDIOLOGY | Facility: CLINIC | Age: 58
DRG: 314 | End: 2023-09-29
Attending: INTERNAL MEDICINE
Payer: COMMERCIAL

## 2023-09-29 LAB
% LINING CELLS, BODY FLUID: 1 %
ALBUMIN BODY FLUID SOURCE: NORMAL
ALBUMIN FLD-MCNC: 3.6 G/DL
ALBUMIN SERPL BCG-MCNC: 3.8 G/DL (ref 3.5–5.2)
ANION GAP SERPL CALCULATED.3IONS-SCNC: 19 MMOL/L (ref 7–15)
APPEARANCE FLD: ABNORMAL
BASOPHILS # BLD AUTO: 0 10E3/UL (ref 0–0.2)
BASOPHILS NFR BLD AUTO: 1 %
BUN SERPL-MCNC: 87.9 MG/DL (ref 8–23)
CALCIUM SERPL-MCNC: 9.4 MG/DL (ref 8.6–10)
CELL COUNT BODY FLUID SOURCE: ABNORMAL
CHLORIDE SERPL-SCNC: 97 MMOL/L (ref 98–107)
COLOR FLD: ABNORMAL
CREAT SERPL-MCNC: 8.87 MG/DL (ref 0.67–1.17)
DEPRECATED HCO3 PLAS-SCNC: 22 MMOL/L (ref 22–29)
EGFRCR SERPLBLD CKD-EPI 2021: 6 ML/MIN/1.73M2
EOSINOPHIL # BLD AUTO: 0.2 10E3/UL (ref 0–0.7)
EOSINOPHIL NFR BLD AUTO: 6 %
EOSINOPHIL NFR FLD MANUAL: 5 %
ERYTHROCYTE [DISTWIDTH] IN BLOOD BY AUTOMATED COUNT: 13.8 % (ref 10–15)
GLUCOSE BODY FLUID SOURCE: NORMAL
GLUCOSE FLD-MCNC: 69 MG/DL
GLUCOSE SERPL-MCNC: 92 MG/DL (ref 70–99)
GRAM STAIN RESULT: NORMAL
GRAM STAIN RESULT: NORMAL
HBV SURFACE AB SERPL IA-ACNC: 0.12 M[IU]/ML
HBV SURFACE AB SERPL IA-ACNC: NONREACTIVE M[IU]/ML
HBV SURFACE AG SERPL QL IA: NONREACTIVE
HCT VFR BLD AUTO: 28.9 % (ref 40–53)
HGB BLD-MCNC: 9.3 G/DL (ref 13.3–17.7)
IMM GRANULOCYTES # BLD: 0 10E3/UL
IMM GRANULOCYTES NFR BLD: 0 %
LD BODY BODY FLUID SOURCE: NORMAL
LDH FLD L TO P-CCNC: 133 U/L
LVEF ECHO: NORMAL
LYMPHOCYTES # BLD AUTO: 1.3 10E3/UL (ref 0.8–5.3)
LYMPHOCYTES NFR BLD AUTO: 45 %
LYMPHOCYTES NFR FLD MANUAL: 59 %
MCH RBC QN AUTO: 31.1 PG (ref 26.5–33)
MCHC RBC AUTO-ENTMCNC: 32.2 G/DL (ref 31.5–36.5)
MCV RBC AUTO: 97 FL (ref 78–100)
MONOCYTES # BLD AUTO: 0.3 10E3/UL (ref 0–1.3)
MONOCYTES NFR BLD AUTO: 11 %
MONOS+MACROS NFR FLD MANUAL: 25 %
NEUTROPHILS # BLD AUTO: 1 10E3/UL (ref 1.6–8.3)
NEUTROPHILS NFR BLD AUTO: 37 %
NEUTS BAND NFR FLD MANUAL: 10 %
NRBC # BLD AUTO: 0 10E3/UL
NRBC BLD AUTO-RTO: 0 /100
PHOSPHATE SERPL-MCNC: 6.1 MG/DL (ref 2.5–4.5)
PLATELET # BLD AUTO: 77 10E3/UL (ref 150–450)
POTASSIUM SERPL-SCNC: 4.5 MMOL/L (ref 3.4–5.3)
PROT FLD-MCNC: 5.7 G/DL
PROTEIN BODY FLUID SOURCE: NORMAL
RBC # BLD AUTO: 2.99 10E6/UL (ref 4.4–5.9)
SODIUM SERPL-SCNC: 138 MMOL/L (ref 135–145)
WBC # BLD AUTO: 2.8 10E3/UL (ref 4–11)
WBC # FLD AUTO: 633 /UL

## 2023-09-29 PROCEDURE — 0W9D30Z DRAINAGE OF PERICARDIAL CAVITY WITH DRAINAGE DEVICE, PERCUTANEOUS APPROACH: ICD-10-PCS | Performed by: INTERNAL MEDICINE

## 2023-09-29 PROCEDURE — 83615 LACTATE (LD) (LDH) ENZYME: CPT | Performed by: INTERNAL MEDICINE

## 2023-09-29 PROCEDURE — 93010 ELECTROCARDIOGRAM REPORT: CPT | Performed by: INTERNAL MEDICINE

## 2023-09-29 PROCEDURE — 93321 DOPPLER ECHO F-UP/LMTD STD: CPT | Mod: 26 | Performed by: INTERNAL MEDICINE

## 2023-09-29 PROCEDURE — 99152 MOD SED SAME PHYS/QHP 5/>YRS: CPT | Performed by: INTERNAL MEDICINE

## 2023-09-29 PROCEDURE — 93308 TTE F-UP OR LMTD: CPT

## 2023-09-29 PROCEDURE — 85025 COMPLETE CBC W/AUTO DIFF WBC: CPT | Performed by: INTERNAL MEDICINE

## 2023-09-29 PROCEDURE — 250N000012 HC RX MED GY IP 250 OP 636 PS 637: Performed by: HOSPITALIST

## 2023-09-29 PROCEDURE — 250N000011 HC RX IP 250 OP 636: Performed by: INTERNAL MEDICINE

## 2023-09-29 PROCEDURE — 89050 BODY FLUID CELL COUNT: CPT | Performed by: INTERNAL MEDICINE

## 2023-09-29 PROCEDURE — 250N000013 HC RX MED GY IP 250 OP 250 PS 637: Performed by: INTERNAL MEDICINE

## 2023-09-29 PROCEDURE — 99232 SBSQ HOSP IP/OBS MODERATE 35: CPT | Performed by: INTERNAL MEDICINE

## 2023-09-29 PROCEDURE — 82042 OTHER SOURCE ALBUMIN QUAN EA: CPT | Performed by: INTERNAL MEDICINE

## 2023-09-29 PROCEDURE — 250N000009 HC RX 250: Performed by: INTERNAL MEDICINE

## 2023-09-29 PROCEDURE — C1894 INTRO/SHEATH, NON-LASER: HCPCS | Performed by: INTERNAL MEDICINE

## 2023-09-29 PROCEDURE — 87070 CULTURE OTHR SPECIMN AEROBIC: CPT | Performed by: INTERNAL MEDICINE

## 2023-09-29 PROCEDURE — 33017 PRCRD DRG 6YR+ W/O CGEN CAR: CPT | Performed by: INTERNAL MEDICINE

## 2023-09-29 PROCEDURE — 210N000002 HC R&B HEART CARE

## 2023-09-29 PROCEDURE — P9045 ALBUMIN (HUMAN), 5%, 250 ML: HCPCS | Mod: JZ | Performed by: STUDENT IN AN ORGANIZED HEALTH CARE EDUCATION/TRAINING PROGRAM

## 2023-09-29 PROCEDURE — 93308 TTE F-UP OR LMTD: CPT | Mod: 26 | Performed by: INTERNAL MEDICINE

## 2023-09-29 PROCEDURE — 88342 IMHCHEM/IMCYTCHM 1ST ANTB: CPT | Mod: TC | Performed by: INTERNAL MEDICINE

## 2023-09-29 PROCEDURE — 80069 RENAL FUNCTION PANEL: CPT | Performed by: INTERNAL MEDICINE

## 2023-09-29 PROCEDURE — 99232 SBSQ HOSP IP/OBS MODERATE 35: CPT | Mod: 25 | Performed by: PHYSICIAN ASSISTANT

## 2023-09-29 PROCEDURE — 258N000003 HC RX IP 258 OP 636: Performed by: STUDENT IN AN ORGANIZED HEALTH CARE EDUCATION/TRAINING PROGRAM

## 2023-09-29 PROCEDURE — 88305 TISSUE EXAM BY PATHOLOGIST: CPT | Mod: TC | Performed by: INTERNAL MEDICINE

## 2023-09-29 PROCEDURE — 93325 DOPPLER ECHO COLOR FLOW MAPG: CPT

## 2023-09-29 PROCEDURE — 82945 GLUCOSE OTHER FLUID: CPT | Performed by: INTERNAL MEDICINE

## 2023-09-29 PROCEDURE — 5A1D70Z PERFORMANCE OF URINARY FILTRATION, INTERMITTENT, LESS THAN 6 HOURS PER DAY: ICD-10-PCS | Performed by: INTERNAL MEDICINE

## 2023-09-29 PROCEDURE — 90937 HEMODIALYSIS REPEATED EVAL: CPT

## 2023-09-29 PROCEDURE — 36415 COLL VENOUS BLD VENIPUNCTURE: CPT | Performed by: INTERNAL MEDICINE

## 2023-09-29 PROCEDURE — 634N000001 HC RX 634: Mod: JZ | Performed by: STUDENT IN AN ORGANIZED HEALTH CARE EDUCATION/TRAINING PROGRAM

## 2023-09-29 PROCEDURE — 84311 SPECTROPHOTOMETRY: CPT | Performed by: HOSPITALIST

## 2023-09-29 PROCEDURE — 90935 HEMODIALYSIS ONE EVALUATION: CPT | Performed by: INTERNAL MEDICINE

## 2023-09-29 PROCEDURE — 93325 DOPPLER ECHO COLOR FLOW MAPG: CPT | Mod: 26 | Performed by: INTERNAL MEDICINE

## 2023-09-29 PROCEDURE — 87205 SMEAR GRAM STAIN: CPT | Performed by: INTERNAL MEDICINE

## 2023-09-29 PROCEDURE — 250N000011 HC RX IP 250 OP 636: Mod: JZ | Performed by: STUDENT IN AN ORGANIZED HEALTH CARE EDUCATION/TRAINING PROGRAM

## 2023-09-29 PROCEDURE — 272N000001 HC OR GENERAL SUPPLY STERILE: Performed by: INTERNAL MEDICINE

## 2023-09-29 PROCEDURE — 250N000013 HC RX MED GY IP 250 OP 250 PS 637: Performed by: HOSPITALIST

## 2023-09-29 PROCEDURE — 93005 ELECTROCARDIOGRAM TRACING: CPT

## 2023-09-29 PROCEDURE — 84157 ASSAY OF PROTEIN OTHER: CPT | Performed by: INTERNAL MEDICINE

## 2023-09-29 RX ORDER — HYDROMORPHONE HYDROCHLORIDE 1 MG/ML
0.3 INJECTION, SOLUTION INTRAMUSCULAR; INTRAVENOUS; SUBCUTANEOUS
Status: DISCONTINUED | OUTPATIENT
Start: 2023-09-29 | End: 2023-10-01 | Stop reason: HOSPADM

## 2023-09-29 RX ORDER — FENTANYL CITRATE 50 UG/ML
INJECTION, SOLUTION INTRAMUSCULAR; INTRAVENOUS
Status: DISCONTINUED | OUTPATIENT
Start: 2023-09-29 | End: 2023-09-29 | Stop reason: HOSPADM

## 2023-09-29 RX ORDER — OXYCODONE HYDROCHLORIDE 5 MG/1
5-10 TABLET ORAL EVERY 4 HOURS PRN
Status: DISCONTINUED | OUTPATIENT
Start: 2023-09-29 | End: 2023-10-01 | Stop reason: HOSPADM

## 2023-09-29 RX ORDER — IBUPROFEN 400 MG/1
400 TABLET, FILM COATED ORAL EVERY 6 HOURS PRN
Status: DISCONTINUED | OUTPATIENT
Start: 2023-09-29 | End: 2023-10-01 | Stop reason: HOSPADM

## 2023-09-29 RX ORDER — KETOROLAC TROMETHAMINE 15 MG/ML
15 INJECTION, SOLUTION INTRAMUSCULAR; INTRAVENOUS EVERY 6 HOURS PRN
Status: DISCONTINUED | OUTPATIENT
Start: 2023-09-29 | End: 2023-09-29

## 2023-09-29 RX ADMIN — SEVELAMER CARBONATE 800 MG: 800 TABLET, FILM COATED ORAL at 14:47

## 2023-09-29 RX ADMIN — OXYCODONE HYDROCHLORIDE 5 MG: 5 TABLET ORAL at 13:33

## 2023-09-29 RX ADMIN — SENNOSIDES AND DOCUSATE SODIUM 1 TABLET: 50; 8.6 TABLET ORAL at 21:02

## 2023-09-29 RX ADMIN — SEVELAMER CARBONATE 800 MG: 800 TABLET, FILM COATED ORAL at 17:21

## 2023-09-29 RX ADMIN — GABAPENTIN 100 MG: 100 CAPSULE ORAL at 21:03

## 2023-09-29 RX ADMIN — HEPARIN SODIUM 1900 UNITS: 1000 INJECTION INTRAVENOUS; SUBCUTANEOUS at 11:00

## 2023-09-29 RX ADMIN — HYDROXYZINE HYDROCHLORIDE 25 MG: 25 TABLET, FILM COATED ORAL at 07:46

## 2023-09-29 RX ADMIN — SODIUM CHLORIDE 300 ML: 9 INJECTION, SOLUTION INTRAVENOUS at 10:59

## 2023-09-29 RX ADMIN — EPOETIN ALFA-EPBX 4000 UNITS: 4000 INJECTION, SOLUTION INTRAVENOUS; SUBCUTANEOUS at 10:55

## 2023-09-29 RX ADMIN — Medication: at 10:58

## 2023-09-29 RX ADMIN — ALBUMIN HUMAN 250 ML: 0.05 INJECTION, SOLUTION INTRAVENOUS at 08:22

## 2023-09-29 RX ADMIN — LACOSAMIDE 100 MG: 100 TABLET, FILM COATED ORAL at 21:39

## 2023-09-29 RX ADMIN — ACETAMINOPHEN 650 MG: 325 TABLET, FILM COATED ORAL at 07:46

## 2023-09-29 RX ADMIN — LIDOCAINE AND PRILOCAINE: 25; 25 CREAM TOPICAL at 07:33

## 2023-09-29 RX ADMIN — TACROLIMUS 1 MG: 1 CAPSULE ORAL at 14:47

## 2023-09-29 RX ADMIN — HEPARIN SODIUM 1900 UNITS: 1000 INJECTION INTRAVENOUS; SUBCUTANEOUS at 11:01

## 2023-09-29 RX ADMIN — TACROLIMUS 1 MG: 1 CAPSULE ORAL at 21:39

## 2023-09-29 RX ADMIN — ACETAMINOPHEN 650 MG: 325 TABLET, FILM COATED ORAL at 03:49

## 2023-09-29 RX ADMIN — OXYCODONE HYDROCHLORIDE 10 MG: 5 TABLET ORAL at 21:02

## 2023-09-29 RX ADMIN — ACETAMINOPHEN 650 MG: 325 TABLET, FILM COATED ORAL at 14:47

## 2023-09-29 RX ADMIN — MIDODRINE HYDROCHLORIDE 10 MG: 5 TABLET ORAL at 07:33

## 2023-09-29 RX ADMIN — MIDODRINE HYDROCHLORIDE 10 MG: 5 TABLET ORAL at 05:42

## 2023-09-29 RX ADMIN — MIDODRINE HYDROCHLORIDE 10 MG: 5 TABLET ORAL at 14:34

## 2023-09-29 RX ADMIN — HYDROMORPHONE HYDROCHLORIDE 0.3 MG: 1 INJECTION, SOLUTION INTRAMUSCULAR; INTRAVENOUS; SUBCUTANEOUS at 15:27

## 2023-09-29 RX ADMIN — OXYCODONE HYDROCHLORIDE 10 MG: 5 TABLET ORAL at 17:21

## 2023-09-29 ASSESSMENT — ACTIVITIES OF DAILY LIVING (ADL)
ADLS_ACUITY_SCORE: 21

## 2023-09-29 NOTE — PROGRESS NOTES
Renal Medicine Inpatient Dialysis Note                                Blanco Osborne MRN# [id number]   Age: 58 year old YOB: 1965   Date of Admission: 9/27/2023 Hospital LOS: 2          Assessment/Plan:     1.  ESRD on HD     MWF. Deirdre DOBSON . Dr. Augustine weems. EDW 85.5kg. 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 Still has RIJ TDC. Called his unit, they prefer us to use the catheter for his run here tomorrow. No heparin.     2.  CKD MBD   Continue PTA renvela.      3.  Anemia of CKD   Hgb 9.5, will give epo with dialysis.       Continue MWF schedule  No heparin dialysis   Next 10/02/23      Interval History:     Dialysis run parameters reviewed with dialysis RN at patient bedside.  Seen on run    3.5 hour run   RIJ  AVF in place  Cannulated as outpatient    3K    5% albumin prime  10 mg midodrine pre run  UF as able to 2 liter range    Stable initial portion of run      ROS     GENERAL: NAD, No fever,chills  R: NEGATIVE for significant cough or SOB  CV: NEGATIVE for chest pain, palpitations  EXT: no change edema    Dialysis Parameters:     Vitals were reviewed  Patient Vitals for the past 8 hrs:   BP Temp Temp src Pulse Resp SpO2 Weight   09/29/23 0900 106/66 -- -- 61 18 -- --   09/29/23 0845 111/74 -- -- 60 18 -- --   09/29/23 0830 109/72 -- -- 57 15 -- --   09/29/23 0815 111/73 -- -- 55 19 -- --   09/29/23 0811 121/73 -- -- 56 14 94 % --   09/29/23 0805 115/73 98.1  F (36.7  C) Oral 58 16 92 % --   09/29/23 0736 117/70 97.5  F (36.4  C) Oral 71 20 94 % --   09/29/23 0548 -- -- -- -- -- -- 87.5 kg (192 lb 14.4 oz)   09/29/23 0346 -- 97.6  F (36.4  C) Oral 66 20 -- --     Wt Readings from Last 4 Encounters:   09/29/23 87.5 kg (192 lb 14.4 oz)   09/21/23 88.5 kg (195 lb)   09/11/23 86.6 kg (191 lb)   09/05/23 85.7 kg (189 lb)     No intake/output data recorded.    Vitals:    09/27/23 1737 09/28/23 0608  09/29/23 0548   Weight: 86.6 kg (190 lb 14.4 oz) 86.5 kg (190 lb 11.2 oz) 87.5 kg (192 lb 14.4 oz)       Current Weight: 87.5  Dry Weight: 85  Dialysis Temp: 36.5  C  Access Device: IJ  Access Site: right  Dialyzer: Revaclear  Dialysis Bath: 3  Sodium Profile: n  UF Goal: 2.5  Blood Flow Rate (mL/min): 400  Total Treatment Time (hrs): 3.5  Heparin: Low dose as required      EPO dose: y  Zemplar: n  IV Fe: n      Medications and Allergies:     Reviewed      Physical Exam:     Seen and examined during course of dialysis run    /66   Pulse 61   Temp 98.1  F (36.7  C) (Oral)   Resp 18   Ht 1.829 m (6')   Wt 87.5 kg (192 lb 14.4 oz)   SpO2 94%   BMI 26.16 kg/m      GENERAL: awake, alert, follows  HEENT: NC/AT, PERRLA, EOMI, non icteric, pharynx moist without lesion  RESP: clear  CV: RRR, normal S1 S2  MS: no edema  SKIN: catheter site clean without drainage  NEURO: speech normal and cranial nerves 2-12 intact  PSYCH: affect normal/bright    Data:       Recent Labs   Lab 09/29/23  0552      POTASSIUM 4.5   CHLORIDE 97*   CO2 22   ANIONGAP 19*   GLC 92   BUN 87.9*   CR 8.87*   GFRESTIMATED 6*   KELLY 9.4         G Jose Reilly MD    [facility] - Nephrology  [phone]

## 2023-09-29 NOTE — PLAN OF CARE
Pt here with pericardial effusion. A&Ox4. BP soft but stable, pt taking midodrine before dialysis. Tele SR w/ AVB. NPO pending pericardiocentesis. Up independently. C/o headache, managing with tylenol. L AV fistula site WDL, bruit and thrill present. R internal jugular site WDL. Plan for dialysis this AM and pericardiocentesis in PM.

## 2023-09-29 NOTE — PROGRESS NOTES
[facility]    Hospitalist Progress Note    Brief Summary:  Blanco Osborne is a 58 year old male with extensive PMH including h/o liver transplant 2016 due to alcohol abuse, pAF (on chronic anticoagulation), history of diabetes mellitus type 2, CVA, hypertension, CHRISTIAN on BiPAP, h/o respiratory failure with parainfluenza and  strep pneumoniae with parapneumonic pleural effusion, h/o pericardial effusion who was sent from cardiology clinic as a direct admission for worsening pericardial effusion, admitted inpatient 9/27/23.     He has history of prolonged hospitalization early 2023. 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  He was recovering in LTACH and then had a prolonged hospital stay at [facility] (1/21/23 to 4/28/23) with loculated pleural effusion.     Assessment & Plan        Worsening pericardial effusion  -sent from cardiology clinic; ECHO 9/27 noted with moderate pericardial effusion but with no tamponade physiology, EF 60-65 %  -currently hemodynamically stable; not hypoxic  -Audiology is consulted and following, scheduled for pericardiocentesis today.  -Overall remained stable at this time.  Send fluid for analysis     H/o prolonged hospitalization with respiratory failure/ARDS following parainfluenza and strep pneumoniae with b/l pleural effusion, respiratory failure requiring tracheostomy-- now resolved     ESRD on hemodialysis  H/o Left AV fistula pseudoaneurysm repair (8/2023 by vascular surgery)  -gets Monday, Wednesday, Friday dialysis; dialyzed 9/27/23  -has left upper extremity AV fistula; also right chest dialysis catheter  Neurology consulted and scheduled for dialysis tomorrow     Chronic liver disease, history of liver transplant 2016  H/o SBP  -LFTs stable at this  time  -will resume PTA tacrolimus when verified by pharmacy     History of seizure, on Keppra and Vimpat, resume when verified     Paroxysmal on atrial fibrillation  History of embolic strokes  H/o PEA arrest  H/o Chronic hypotension  -currently in sinus rhythm  -Holding Eliquis for procedure resume it after the procedure  -Use midodrine on the day of dialysis, blood pressure stable at this time     GERD: resume PTA Protonix when verified     Clinically Significant Risk Factors Present on Admission                # Drug Induced Coagulation Defect: home medication list includes an anticoagulant medication    # Hypertension: Noted on problem list      # Overweight: Estimated body mass index is 26.45 kg/m  as calculated from the following:    Height as of 9/21/23: 1.829 m (6').    Weight as of 9/21/23: 88.5 kg (195 lb).               DVT prophylaxis: Resume Eliquis after cardiocentesis  Code Status: Full Code    Disposition: Expected discharge in 1 to 2 days once remained stable    Juan Moeller MD, MD  Text Page  (7am - 6pm)    Interval History   Seen during the dialysis today, offers no active complaint, disappointed that he did not had the procedure yesterday that he has to wait for at least 24-hour to hold the apixaban.  Offers no complaint at this time.  Asking when he can be discharged.    No other significant event overnight      -Data reviewed today: I reviewed all new labs and imaging results over the last 24 hours. I personally reviewed no images or EKG's today.    Physical Exam   Temp: 98.1  F (36.7  C) Temp src: Oral BP: 105/70 Pulse: 63   Resp: 15 SpO2: 94 % O2 Device: Nasal cannula    Vitals:    09/27/23 1737 09/28/23 0608 09/29/23 0548   Weight: 86.6 kg (190 lb 14.4 oz) 86.5 kg (190 lb 11.2 oz) 87.5 kg (192 lb 14.4 oz)     Vital Signs with Ranges  Temp:  [97.5  F (36.4  C)-98.3  F (36.8  C)] 98.1  F (36.7  C)  Pulse:  [55-72] 63  Resp:  [14-21] 15  BP: ()/(56-81) 105/70  SpO2:  [92 %-97 %] 94 %  No  intake/output data recorded.    Constitutional: awake, alert, cooperative, no apparent distress, and appears stated age  Eyes: Lids and lashes normal, pupils equal, round and reactive to light, extra ocular muscles intact, sclera clear, conjunctiva normal  Respiratory: No increased work of breathing, good air exchange, clear to auscultation bilaterally, no crackles or wheezing  Cardiovascular: Normal apical impulse, regular rate and rhythm, normal S1 and S2, no S3 or S4, and no murmur noted  GI: No scars, normal bowel sounds, soft, non-distended, non-tender, no masses palpated, no hepatosplenomegally  Musculoskeletal: no lower extremity pitting edema present  Neurologic: No focal deficit    Medications      - MEDICATION INSTRUCTIONS for Dialysis Patients -   Does not apply See Admin Instructions    [Held by provider] apixaban ANTICOAGULANT  5 mg Oral BID    gabapentin  100 mg Oral QPM    Lacosamide  100 mg Oral At Bedtime    lacosamide  50 mg Oral QAM    midodrine  10 mg Oral 3 times per day on Mon Wed Fri    senna-docusate  1 tablet Oral BID    Or    senna-docusate  2 tablet Oral BID    sevelamer carbonate  800 mg Oral 4x Daily    tacrolimus  1 mg Oral Q12H       Data   Recent Labs   Lab 09/29/23  0552 09/27/23  1750   WBC 2.8* 4.3   HGB 9.3* 9.5*   MCV 97 97   PLT 77* 109*    139   POTASSIUM 4.5 4.2   CHLORIDE 97* 95*   CO2 22 30*   BUN 87.9* 60.0*   CR 8.87* 6.27*   ANIONGAP 19* 14   KELLY 9.4 9.5   GLC 92 96   ALBUMIN 3.8 4.3   PROTTOTAL  --  8.0   BILITOTAL  --  0.5   ALKPHOS  --  201*   ALT  --  14   AST  --  28         No results found for this or any previous visit (from the past 24 hour(s)).

## 2023-09-29 NOTE — PROGRESS NOTES
[facility]  Cardiology Progress Note  Date of Service: 09/29/2023  Primary Cardiologist: Dr. Abreu    Assessment & Plan    Blanco Osborne is a 58 year old male with past medical history significant for liver transplant and chronic thrombocytopenia, end-stage renal disease on dialysis awaiting transplant, paroxysmal A-fib on Eliquis, hypotension on midodrine, sleep apnea on BiPAP, history of CVA, history of respiratory failure due to various organisms, history of EtOH abuse admitted on 9/27/2023 with increasing size pericardial effusion on outpatient echocardiogram, admitted given progression of size for the planned pericardiocentesis.    Assessment:   Moderate to large pericardial effusion, worsening.  Agree with the pericardiocentesis.    -Status post pericardiocentesis for 360 cc.  Fluid pending.  Suspect secondary to renal failure and dialysis.   -8 out of 10 chest pain, has not yet gotten pain meds.  He had no pain prior to drain placement, this pain is expected.  -Please monitor output closely, limited echo in the morning we will pull tomorrow if output is low and no recurrent effusion.    2.  Paroxysmal atrial fibrillation on apixaban which has been withheld for the pericardiocentesis  3.  End-stage renal disease on hemodialysis  4.  S/p liver transplant with cirrhosis and chronic thrombocytopenia  5.  Low blood pressure on chronic midodrine with dialysis Monday Wednesday and Friday    Plan:   Close output monitoring.  Limited echo in the morning, if low output and no recurrent effusion and possible drain removal then  Continue to hold Eliquis  We will continue to await fluid studies  Dispo pending the above.  We will continue to follow with you.    Justyna Eller PA-C  [facility]  Pager: [phone]     I spent  30 minutes face-to-face and/or coordinating care of Blanco Osborne. Over 50% of our time on the unit was spent counseling the patient and/or coordinating care regarding his or  her cardiac condition.    Interval History   He feels okay, 8 out of 10 chest pain worse with deep breaths.  He had numbness prior to procedure and he understands its secondary to having the pericardial drain in place.  He also had dialysis this morning so he is pretty tired.  Working on getting a kidney transplant.  Has done relatively well with his liver transplant.  He has a niece who may be a candidate.    Physical Exam   Temp: 98.1  F (36.7  C) Temp src: Oral BP: 106/72 Pulse: 58   Resp: 18 SpO2: 94 % O2 Device: Nasal cannula    Vitals:    09/27/23 1737 09/28/23 0608 09/29/23 0548   Weight: 86.6 kg (190 lb 14.4 oz) 86.5 kg (190 lb 11.2 oz) 87.5 kg (192 lb 14.4 oz)     Well-developed well-nourished gentleman in no acute distress.  Normocephalic atraumatic.  Heart is regular with a 2 out of 6 systolic murmur heard best at lower left sternal border with a question of a rub.  Lungs are clear anteriorly.  Extremities without peripheral edema.  Skin is warm and dry.    Clinically Significant Risk Factors             # Anion Gap Metabolic Acidosis: Highest Anion Gap = 19 mmol/L in last 2 days, will monitor and treat as appropriate    # Thrombocytopenia: Lowest platelets = 77 in last 2 days, will monitor for bleeding   # Hypertension: Noted on problem list        # Overweight: Estimated body mass index is 26.16 kg/m  as calculated from the following:    Height as of this encounter: 1.829 m (6').    Weight as of this encounter: 87.5 kg (192 lb 14.4 oz)., PRESENT ON ADMISSION           Cardiac Arrhythmia: Atrial fibrillation: Paroxysmal    Fluid overload, unspecified    Liver failure with history of transplant    CKD POA List: ESRD on dialysis            Medications      - MEDICATION INSTRUCTIONS for Dialysis Patients -   Does not apply See Admin Instructions    [Held by provider] apixaban ANTICOAGULANT  5 mg Oral BID    gabapentin  100 mg Oral QPM    Lacosamide  100 mg Oral At Bedtime    lacosamide  50 mg Oral QAM     midodrine  10 mg Oral 3 times per day on Mon Wed Fri    senna-docusate  1 tablet Oral BID    Or    senna-docusate  2 tablet Oral BID    sevelamer carbonate  800 mg Oral 4x Daily    tacrolimus  1 mg Oral Q12H       Data   Last 24 hours labs reviewed

## 2023-09-29 NOTE — PLAN OF CARE
A&O x 4. VSS, ex soft BP's at times. Tele: SR w/1st degree AVB. Assist x 1 d/t pericardial drain. L) upper arm fistula, (+) Bruit/thrill. R) chest dialysis catheter. Anuric. L) pericardial drain site, CDI, output unchanged. Oxycodone, IV dilaudid, tylenol for pain management. Plan for ECHO tomorrow & possible drain removal. Family at bedside. Will continue w/plan of care.

## 2023-09-29 NOTE — PROGRESS NOTES
Potassium   Date Value Ref Range Status   09/29/2023 4.5 3.4 - 5.3 mmol/L Final   12/29/2022 3.4 3.4 - 5.3 mmol/L Final   04/20/2020 3.9 3.4 - 5.3 mmol/L Final     Potassium POCT   Date Value Ref Range Status   01/19/2023 3.6 3.5 - 5.0 mmol/L Final     Hemoglobin   Date Value Ref Range Status   09/29/2023 9.3 (L) 13.3 - 17.7 g/dL Final   04/20/2020 14.3 13.3 - 17.7 g/dL Final     Creatinine   Date Value Ref Range Status   09/29/2023 8.87 (H) 0.67 - 1.17 mg/dL Final   04/20/2020 1.06 0.66 - 1.25 mg/dL Final     Urea Nitrogen   Date Value Ref Range Status   09/29/2023 87.9 (H) 8.0 - 23.0 mg/dL Final   12/29/2022 46 (H) 7 - 30 mg/dL Final   04/20/2020 23 7 - 30 mg/dL Final     Sodium   Date Value Ref Range Status   09/29/2023 138 135 - 145 mmol/L Final     Comment:     Reference intervals for this test were updated on 09/26/2023 to more accurately reflect our healthy population. There may be differences in the flagging of prior results with similar values performed with this method. Interpretation of those prior results can be made in the context of the updated reference intervals.    04/20/2020 139 133 - 144 mmol/L Final     INR   Date Value Ref Range Status   08/16/2023 1.29 (H) 0.85 - 1.15 Final   10/07/2019 1.20 (H) 0.86 - 1.14 Final       DIALYSIS PROCEDURE NOTE  Hepatitis status of previous patient on machine log was checked and verified ok to use with this patients hepatitis status.  Patient dialyzed for 3.5 hrs. on a K3 bath with a net fluid removal of  2.5L.  A BFR of 400 ml/min was obtained via a Right internal jugular CVC.      The treatment plan was discussed with Dr. Reilly during the treatment.    Total heparin received during the treatment: 0 units.     Line flushed, clamped and capped with heparin 1:1000 1.9 mL (1900 units) per lumen    Meds  given: 4,000units epoetin given and 5% albumin prime with 10mg midodrine pretreatment   Complications: none      Person educated: patient. Knowledge base  substantial. Barriers to learning: none. Educated on procedure, potassium and fluid management  via oral mode. Patient verbalized understanding. Pt prefers verbal education style.     ICEBOAT? Timeout performed pre-treatment  I: Patient was identified using 2 identifiers  C:  Consent Signed Yes  E: Equipment preventative maintenance is current and dialysis delivery system OK to use  B: Hepatitis B Surface Antigen: Negative; Draw Date: 09/06/23      Hepatitis B Surface Antibody: Unknown, pending results; Draw Date: 09/28/23  O: Dialysis orders present and complete prior to treatment  A: Vascular access verified and assessed prior to treatment  T: Treatment was performed at a clinically appropriate time  ?: Patient was allowed to ask questions and address concerns prior to treatment  See Adult Hemodialysis flowsheet in [employer] for further details and post assessment.  Machine water alarm in place and functioning. Transducer pods intact and checked every 15min.   Pt returned via bed transport.  Chlorine/Chloramine water system checked every 4 hours.  Outpatient Dialysis at [place] on MWF    Patient repositioned every 2 hours during the treatment.  Post treatment report given to YENNIFER Haley RN regarding 2.5L of fluid removed, last BP of 105/69, and patient pain rating of 0/10.

## 2023-09-30 ENCOUNTER — APPOINTMENT (OUTPATIENT)
Dept: CARDIOLOGY | Facility: CLINIC | Age: 58
DRG: 314 | End: 2023-09-30
Attending: INTERNAL MEDICINE
Payer: COMMERCIAL

## 2023-09-30 LAB — LVEF ECHO: NORMAL

## 2023-09-30 PROCEDURE — 99232 SBSQ HOSP IP/OBS MODERATE 35: CPT | Mod: 25 | Performed by: INTERNAL MEDICINE

## 2023-09-30 PROCEDURE — 250N000011 HC RX IP 250 OP 636: Performed by: INTERNAL MEDICINE

## 2023-09-30 PROCEDURE — 93325 DOPPLER ECHO COLOR FLOW MAPG: CPT

## 2023-09-30 PROCEDURE — 250N000013 HC RX MED GY IP 250 OP 250 PS 637: Performed by: HOSPITALIST

## 2023-09-30 PROCEDURE — 250N000013 HC RX MED GY IP 250 OP 250 PS 637: Performed by: INTERNAL MEDICINE

## 2023-09-30 PROCEDURE — 99232 SBSQ HOSP IP/OBS MODERATE 35: CPT | Performed by: INTERNAL MEDICINE

## 2023-09-30 PROCEDURE — 93308 TTE F-UP OR LMTD: CPT | Mod: 26 | Performed by: INTERNAL MEDICINE

## 2023-09-30 PROCEDURE — 93321 DOPPLER ECHO F-UP/LMTD STD: CPT | Mod: 26 | Performed by: INTERNAL MEDICINE

## 2023-09-30 PROCEDURE — 93325 DOPPLER ECHO COLOR FLOW MAPG: CPT | Mod: 26 | Performed by: INTERNAL MEDICINE

## 2023-09-30 PROCEDURE — 210N000002 HC R&B HEART CARE

## 2023-09-30 PROCEDURE — 250N000012 HC RX MED GY IP 250 OP 636 PS 637: Performed by: HOSPITALIST

## 2023-09-30 RX ORDER — ACYCLOVIR 200 MG/1
10 CAPSULE ORAL ONCE
Status: COMPLETED | OUTPATIENT
Start: 2023-09-30 | End: 2023-09-30

## 2023-09-30 RX ADMIN — SEVELAMER CARBONATE 800 MG: 800 TABLET, FILM COATED ORAL at 12:00

## 2023-09-30 RX ADMIN — TACROLIMUS 1 MG: 1 CAPSULE ORAL at 21:28

## 2023-09-30 RX ADMIN — LACOSAMIDE 50 MG: 50 TABLET, FILM COATED ORAL at 09:10

## 2023-09-30 RX ADMIN — LACOSAMIDE 100 MG: 100 TABLET, FILM COATED ORAL at 21:28

## 2023-09-30 RX ADMIN — OXYCODONE HYDROCHLORIDE 10 MG: 5 TABLET ORAL at 05:11

## 2023-09-30 RX ADMIN — SEVELAMER CARBONATE 800 MG: 800 TABLET, FILM COATED ORAL at 09:11

## 2023-09-30 RX ADMIN — SENNOSIDES AND DOCUSATE SODIUM 2 TABLET: 50; 8.6 TABLET ORAL at 20:31

## 2023-09-30 RX ADMIN — OXYCODONE HYDROCHLORIDE 10 MG: 5 TABLET ORAL at 09:10

## 2023-09-30 RX ADMIN — OXYCODONE HYDROCHLORIDE 10 MG: 5 TABLET ORAL at 14:28

## 2023-09-30 RX ADMIN — GABAPENTIN 100 MG: 100 CAPSULE ORAL at 20:31

## 2023-09-30 RX ADMIN — HYDROMORPHONE HYDROCHLORIDE 0.3 MG: 1 INJECTION, SOLUTION INTRAMUSCULAR; INTRAVENOUS; SUBCUTANEOUS at 18:14

## 2023-09-30 RX ADMIN — ACETAMINOPHEN 650 MG: 325 TABLET, FILM COATED ORAL at 01:13

## 2023-09-30 RX ADMIN — TACROLIMUS 1 MG: 1 CAPSULE ORAL at 09:18

## 2023-09-30 RX ADMIN — SEVELAMER CARBONATE 800 MG: 800 TABLET, FILM COATED ORAL at 18:24

## 2023-09-30 RX ADMIN — SENNOSIDES AND DOCUSATE SODIUM 1 TABLET: 50; 8.6 TABLET ORAL at 09:11

## 2023-09-30 RX ADMIN — OXYCODONE HYDROCHLORIDE 5 MG: 5 TABLET ORAL at 01:13

## 2023-09-30 RX ADMIN — HYDROXYZINE HYDROCHLORIDE 25 MG: 25 TABLET, FILM COATED ORAL at 05:14

## 2023-09-30 ASSESSMENT — ACTIVITIES OF DAILY LIVING (ADL)
ADLS_ACUITY_SCORE: 21

## 2023-09-30 NOTE — PLAN OF CARE
Goal Outcome Evaluation:  A&O x4, VSS on RA. Tele SR. Left pericardial drain in place with small bloody out put this shift. Pain managed with prn oxycodone. Up with 1A. Left upper arm fistula with +bruit/thrill. Plans for possible drain removal & echo today.

## 2023-09-30 NOTE — PLAN OF CARE
A&O x 4. VSS. RA. Tele: SR. Oxycodone & IV dilaudid for pain management. Pericardial drain removed, dressing CDI.Up SBA in room. L) upper fistula (+) bruit/thrill.Family at beside. Plan for Echo in AM, restarting Eliquis in AM. Will continue w/plan of care.

## 2023-09-30 NOTE — PROGRESS NOTES
[facility]    Hospitalist Progress Note    Brief Summary:  Blanco Osborne is a 58 year old male with extensive PMH including h/o liver transplant 2016 due to alcohol abuse, pAF (on chronic anticoagulation), history of diabetes mellitus type 2, CVA, hypertension, CHRISTIAN on BiPAP, h/o respiratory failure with parainfluenza and  strep pneumoniae with parapneumonic pleural effusion, h/o pericardial effusion who was sent from cardiology clinic as a direct admission for worsening pericardial effusion, admitted inpatient 9/27/23.     He has history of prolonged hospitalization early 2023. 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  He was recovering in LTACH and then had a prolonged hospital stay at [facility] (1/21/23 to 4/28/23) with loculated pleural effusion.     Assessment & Plan        Worsening pericardial effusion  Status post pericardiocentesis and drain placement: 9/29/2023  -sent from cardiology clinic; ECHO 9/27 noted with moderate pericardial effusion but with no tamponade physiology, EF 60-65 %  -currently hemodynamically stable; not hypoxic  -Cardiology is consulted and following, and he is now status post pericardiocentesis, about 360 mL fluid removed, drain placement 150 ml came out since yesterday.  Overall remained stable, repeat echocardiogram ordered, report pending but still mild pericardial effusion is my review.  Cardiology is following we will defer further management to them.       H/o prolonged hospitalization with respiratory failure/ARDS following parainfluenza and strep pneumoniae with b/l pleural effusion, respiratory failure requiring tracheostomy-- now resolved     ESRD on hemodialysis  H/o Left AV fistula pseudoaneurysm repair (8/2023 by vascular surgery)  -gets Monday,  Wednesday, Friday dialysis; dialyzed 9/27/23  -has left upper extremity AV fistula; also right chest dialysis catheter  Nephrology is consulted and following, add dialysis on 9/29/2023     Chronic liver disease, history of liver transplant 2016  H/o SBP  -LFTs stable at this time  -will resume PTA tacrolimus when verified by pharmacy     History of seizure, on Keppra and Vimpat, resume when verified     Paroxysmal on atrial fibrillation  History of embolic strokes  H/o PEA arrest  H/o Chronic hypotension  -currently in sinus rhythm  -Holding Eliquis for procedure resume it after the procedure  -Use midodrine on the day of dialysis, blood pressure stable at this time     GERD: resume PTA Protonix when verified     Clinically Significant Risk Factors Present on Admission                # Drug Induced Coagulation Defect: home medication list includes an anticoagulant medication    # Hypertension: Noted on problem list      # Overweight: Estimated body mass index is 26.45 kg/m  as calculated from the following:    Height as of 9/21/23: 1.829 m (6').    Weight as of 9/21/23: 88.5 kg (195 lb).               DVT prophylaxis: Resume Eliquis after cardiocentesis  Code Status: Full Code    Disposition: Expected discharge, once cleared by the cardiology and drain is removed.      Discussed with the patient, cardiology and the nursing staff taking care with the patient.    Juan Moeller MD, MD  Text Page  (7am - 6pm)    Interval History   Patient seen and evaluated in his room today, told me that he is ready to go home.  Had a lot of pain after the drain placement yesterday but now much better controlled.  He denies any fever chills chest pain abdominal pain dysuria materia constipation diarrhea at this time    No other significant event overnight    Palpable 150 mL output from the drain      -Data reviewed today: I reviewed all new labs and imaging results over the last 24 hours. I personally reviewed no images or EKG's  today.    Physical Exam   Temp: 97.8  F (36.6  C) Temp src: Oral BP: 99/68 Pulse: 75   Resp: 20 SpO2: 93 % O2 Device: None (Room air)    Vitals:    09/28/23 0608 09/29/23 0548 09/30/23 0613   Weight: 86.5 kg (190 lb 11.2 oz) 87.5 kg (192 lb 14.4 oz) 85.8 kg (189 lb 1.6 oz)     Vital Signs with Ranges  Temp:  [97.7  F (36.5  C)-98.8  F (37.1  C)] 97.8  F (36.6  C)  Pulse:  [59-78] 75  Resp:  [20-27] 20  BP: ()/(67-82) 99/68  SpO2:  [93 %-99 %] 93 %  I/O last 3 completed shifts:  In: 460 [P.O.:210; Other:250]  Out: 2655 [Drains:155; Other:2500]    Constitutional: awake, alert, cooperative, no apparent distress, and appears stated age  Eyes: Lids and lashes normal, pupils equal, round and reactive to light, extra ocular muscles intact, sclera clear, conjunctiva normal  Respiratory: No increased work of breathing, good air exchange, clear to auscultation bilaterally, no crackles or wheezing  Cardiovascular: Normal apical impulse, regular rate and rhythm, normal S1 and S2, no S3 or S4, and no murmur noted  GI: No scars, normal bowel sounds, soft, non-distended, non-tender, no masses palpated, no hepatosplenomegally  Musculoskeletal: no lower extremity pitting edema present  Neurologic: No focal deficit    Medications      - MEDICATION INSTRUCTIONS for Dialysis Patients -   Does not apply See Admin Instructions    [Held by provider] apixaban ANTICOAGULANT  5 mg Oral BID    gabapentin  100 mg Oral QPM    Lacosamide  100 mg Oral At Bedtime    lacosamide  50 mg Oral QAM    midodrine  10 mg Oral 3 times per day on Mon Wed Fri    senna-docusate  1 tablet Oral BID    Or    senna-docusate  2 tablet Oral BID    sevelamer carbonate  800 mg Oral 4x Daily    tacrolimus  1 mg Oral Q12H       Data   Recent Labs   Lab 09/29/23  0552 09/27/23  1750   WBC 2.8* 4.3   HGB 9.3* 9.5*   MCV 97 97   PLT 77* 109*    139   POTASSIUM 4.5 4.2   CHLORIDE 97* 95*   CO2 22 30*   BUN 87.9* 60.0*   CR 8.87* 6.27*   ANIONGAP 19* 14   KELLY 9.4  9.5   GLC 92 96   ALBUMIN 3.8 4.3   PROTTOTAL  --  8.0   BILITOTAL  --  0.5   ALKPHOS  --  201*   ALT  --  14   AST  --  28       Recent Results (from the past 24 hour(s))   Cardiac Catheterization    Narrative    1.  Successful removal of 360 cc of bloody fluid.  2.  Pericardial drain secured in place     Echocardiogram Limited   Result Value    LVEF  55-60%    Narrative    [id number]  [id number]  [id number]  [id number]^JANEEN^RAULITO^PRINCE     [facility]  Echocardiography Laboratory  [address] [place] [id number]     Name: MARY JO CRAIN  MRN: [id number]  : 1965  Study Date: 2023 12:17 PM  Age: 58 yrs  Gender: Male  Patient Location: [facility]  Reason For Study: Pericardial Effusion  Ordering Physician: RAULITO VERNON  Performed By: Kelly Myers     ______________________________________________________________________________  Procedure  Limited Portable Echo Adult.  ______________________________________________________________________________  Interpretation Summary     Prior to pericardiocentesis, there was a large pericardial effusion, mostly  posterior and lateral, 2.5-3 cm in thickness.     After removal of 360 ml of fluid from the pericardial space, a trivial  pericardial effusion remained.  ______________________________________________________________________________  Left Ventricle  The left ventricle is normal in size. Left ventricular systolic function is  normal. The visual ejection fraction is 55-60%.  ______________________________________________________________________________  Report approved by: Tereso Fuller 2023 03:03 PM     ______________________________________________________________________________

## 2023-09-30 NOTE — PROGRESS NOTES
"[facility]  Cardiology Progress Note  Date of Service: 09/30/2023  Primary Cardiologist: Dr. Brady Schuster & Plan    Blanco Osborne is a 58 year old male with past medical history significant for liver transplant and chronic thrombocytopenia, end-stage renal disease on dialysis awaiting transplant, paroxysmal A-fib on Eliquis, hypotension on midodrine, sleep apnea on BiPAP, history of CVA, history of respiratory failure due to various organisms, history of EtOH abuse admitted on 9/27/2023 with increasing size pericardial effusion on outpatient echocardiogram, admitted given progression of size for the planned pericardiocentesis.    Yesterday he underwent pericardiocentesis which was uncomplicated. It was moderate to large in size . Fluid analysis was nonspecific. It was reported to be \"bloody\" in appearance but no hemoglobin / hematocrit levels were obtained from the sample. A residual effusion was seen again on echo today. We will attempt to aspirate the drain and if unsuccessful the drain will need to be repositioned given the persistent effusion.     Pericardial effusion   2.  Paroxysmal atrial fibrillation on apixaban which has been withheld for the pericardiocentesis  3.  End-stage renal disease on hemodialysis  4.  S/p liver transplant with cirrhosis and chronic thrombocytopenia  5.  Low blood pressure on chronic midodrine with dialysis Monday Wednesday and Friday    Plan:     Will attempt aspiration of drain and repositioning given persistent effusion, but lack of drain output. Drain can be removed after < 50 ml/hr has been removed from drain and limited echo shows resolution of effusion.  Hold eliquis 5mg BID until drain issues resolved  We will continue to follow     ADDENDUM:  I spoke with the oncall internationalist who felt further repositioning or attempts at approaching the posterior effusion would be futile. We ultimately decided to remove the pericardial drain. We " will repeat a limited echocardiogram and restart eliquis in the AM.        Interval History   Patient has mild discomfort at drain site. Otherwise feeling OK     Physical Exam   Temp: 97.8  F (36.6  C) Temp src: Oral BP: 99/68 Pulse: 75   Resp: 20 SpO2: 93 % O2 Device: None (Room air)    Vitals:    09/28/23 0608 09/29/23 0548 09/30/23 0613   Weight: 86.5 kg (190 lb 11.2 oz) 87.5 kg (192 lb 14.4 oz) 85.8 kg (189 lb 1.6 oz)     General: Appears chronicall unwell  Chest: Slight firm soft tissue prominence on chest directly overlying the pericardial drain, no erythema   CV: RRR, no rubs, soft systolic murmur  Extremities: No LE edema    Clinically Significant Risk Factors             # Anion Gap Metabolic Acidosis: Highest Anion Gap = 19 mmol/L in last 2 days, will monitor and treat as appropriate      # Thrombocytopenia: Lowest platelets = 77 in last 2 days, will monitor for bleeding     # Hypertension: Noted on problem list        # Overweight: Estimated body mass index is 25.65 kg/m  as calculated from the following:    Height as of this encounter: 1.829 m (6').    Weight as of this encounter: 85.8 kg (189 lb 1.6 oz).  , PRESENT ON ADMISSION           Cardiac Arrhythmia: Atrial fibrillation: Paroxysmal    Fluid overload, unspecified    Liver failure with history of transplant    CKD POA List: ESRD on dialysis            Medications      - MEDICATION INSTRUCTIONS for Dialysis Patients -   Does not apply See Admin Instructions    [Held by provider] apixaban ANTICOAGULANT  5 mg Oral BID    gabapentin  100 mg Oral QPM    Lacosamide  100 mg Oral At Bedtime    lacosamide  50 mg Oral QAM    midodrine  10 mg Oral 3 times per day on Mon Wed Fri    senna-docusate  1 tablet Oral BID    Or    senna-docusate  2 tablet Oral BID    sevelamer carbonate  800 mg Oral 4x Daily    tacrolimus  1 mg Oral Q12H       Data   Last 24 hours labs reviewed

## 2023-10-01 ENCOUNTER — APPOINTMENT (OUTPATIENT)
Dept: CARDIOLOGY | Facility: CLINIC | Age: 58
DRG: 314 | End: 2023-10-01
Attending: INTERNAL MEDICINE
Payer: COMMERCIAL

## 2023-10-01 VITALS
BODY MASS INDEX: 26.24 KG/M2 | HEIGHT: 72 IN | SYSTOLIC BLOOD PRESSURE: 104 MMHG | WEIGHT: 193.7 LBS | TEMPERATURE: 98.5 F | RESPIRATION RATE: 16 BRPM | OXYGEN SATURATION: 94 % | DIASTOLIC BLOOD PRESSURE: 69 MMHG | HEART RATE: 87 BPM

## 2023-10-01 LAB
ADENOSINE DEAMINASE PCAR-CCNC: 8 U/L
ANION GAP SERPL CALCULATED.3IONS-SCNC: 16 MMOL/L (ref 7–15)
BASOPHILS # BLD AUTO: 0 10E3/UL (ref 0–0.2)
BASOPHILS NFR BLD AUTO: 0 %
BUN SERPL-MCNC: 68.9 MG/DL (ref 8–23)
CALCIUM SERPL-MCNC: 9.2 MG/DL (ref 8.6–10)
CHLORIDE SERPL-SCNC: 91 MMOL/L (ref 98–107)
CREAT SERPL-MCNC: 8.41 MG/DL (ref 0.67–1.17)
DEPRECATED HCO3 PLAS-SCNC: 27 MMOL/L (ref 22–29)
EGFRCR SERPLBLD CKD-EPI 2021: 7 ML/MIN/1.73M2
EOSINOPHIL # BLD AUTO: 0.1 10E3/UL (ref 0–0.7)
EOSINOPHIL NFR BLD AUTO: 2 %
ERYTHROCYTE [DISTWIDTH] IN BLOOD BY AUTOMATED COUNT: 14.6 % (ref 10–15)
GLUCOSE SERPL-MCNC: 106 MG/DL (ref 70–99)
HCT VFR BLD AUTO: 30.2 % (ref 40–53)
HGB BLD-MCNC: 9.6 G/DL (ref 13.3–17.7)
IMM GRANULOCYTES # BLD: 0 10E3/UL
IMM GRANULOCYTES NFR BLD: 1 %
LVEF ECHO: NORMAL
LYMPHOCYTES # BLD AUTO: 1 10E3/UL (ref 0.8–5.3)
LYMPHOCYTES NFR BLD AUTO: 25 %
MCH RBC QN AUTO: 31.2 PG (ref 26.5–33)
MCHC RBC AUTO-ENTMCNC: 31.8 G/DL (ref 31.5–36.5)
MCV RBC AUTO: 98 FL (ref 78–100)
MONOCYTES # BLD AUTO: 0.7 10E3/UL (ref 0–1.3)
MONOCYTES NFR BLD AUTO: 16 %
NEUTROPHILS # BLD AUTO: 2.4 10E3/UL (ref 1.6–8.3)
NEUTROPHILS NFR BLD AUTO: 56 %
NRBC # BLD AUTO: 0 10E3/UL
NRBC BLD AUTO-RTO: 0 /100
PLATELET # BLD AUTO: 71 10E3/UL (ref 150–450)
POTASSIUM SERPL-SCNC: 4.9 MMOL/L (ref 3.4–5.3)
RBC # BLD AUTO: 3.08 10E6/UL (ref 4.4–5.9)
SODIUM SERPL-SCNC: 134 MMOL/L (ref 135–145)
WBC # BLD AUTO: 4.2 10E3/UL (ref 4–11)

## 2023-10-01 PROCEDURE — 250N000012 HC RX MED GY IP 250 OP 636 PS 637: Performed by: HOSPITALIST

## 2023-10-01 PROCEDURE — 99239 HOSP IP/OBS DSCHRG MGMT >30: CPT | Performed by: INTERNAL MEDICINE

## 2023-10-01 PROCEDURE — 93308 TTE F-UP OR LMTD: CPT | Mod: 26 | Performed by: INTERNAL MEDICINE

## 2023-10-01 PROCEDURE — 93321 DOPPLER ECHO F-UP/LMTD STD: CPT | Mod: 26 | Performed by: INTERNAL MEDICINE

## 2023-10-01 PROCEDURE — 250N000013 HC RX MED GY IP 250 OP 250 PS 637: Performed by: HOSPITALIST

## 2023-10-01 PROCEDURE — 99231 SBSQ HOSP IP/OBS SF/LOW 25: CPT | Mod: 25 | Performed by: INTERNAL MEDICINE

## 2023-10-01 PROCEDURE — 93325 DOPPLER ECHO COLOR FLOW MAPG: CPT | Mod: 26 | Performed by: INTERNAL MEDICINE

## 2023-10-01 PROCEDURE — 93325 DOPPLER ECHO COLOR FLOW MAPG: CPT

## 2023-10-01 PROCEDURE — 99232 SBSQ HOSP IP/OBS MODERATE 35: CPT | Performed by: INTERNAL MEDICINE

## 2023-10-01 PROCEDURE — 36415 COLL VENOUS BLD VENIPUNCTURE: CPT | Performed by: INTERNAL MEDICINE

## 2023-10-01 PROCEDURE — 85025 COMPLETE CBC W/AUTO DIFF WBC: CPT | Performed by: INTERNAL MEDICINE

## 2023-10-01 PROCEDURE — 80048 BASIC METABOLIC PNL TOTAL CA: CPT | Performed by: INTERNAL MEDICINE

## 2023-10-01 PROCEDURE — 93308 TTE F-UP OR LMTD: CPT

## 2023-10-01 RX ADMIN — ACETAMINOPHEN 650 MG: 325 TABLET, FILM COATED ORAL at 00:55

## 2023-10-01 RX ADMIN — LACOSAMIDE 50 MG: 50 TABLET, FILM COATED ORAL at 08:47

## 2023-10-01 RX ADMIN — SEVELAMER CARBONATE 800 MG: 800 TABLET, FILM COATED ORAL at 11:40

## 2023-10-01 RX ADMIN — SEVELAMER CARBONATE 800 MG: 800 TABLET, FILM COATED ORAL at 08:47

## 2023-10-01 RX ADMIN — TACROLIMUS 1 MG: 1 CAPSULE ORAL at 10:04

## 2023-10-01 RX ADMIN — SENNOSIDES AND DOCUSATE SODIUM 2 TABLET: 50; 8.6 TABLET ORAL at 08:47

## 2023-10-01 ASSESSMENT — ACTIVITIES OF DAILY LIVING (ADL)
ADLS_ACUITY_SCORE: 20
ADLS_ACUITY_SCORE: 21
ADLS_ACUITY_SCORE: 20

## 2023-10-01 NOTE — DISCHARGE SUMMARY
[facility]    Discharge Summary  Hospitalist    Date of Admission:  9/27/2023  Date of Discharge:  10/1/2023  Discharging Provider: Juan Moeller MD, MD  Date of Service (when I saw the patient): 10/01/23    Discharge Diagnoses   Please refer below    History of Present Illness   Blanco Osborne is an 58 year old male who presented with pericardial effusion    Hospital Course   Blanco Osborne is a 58 year old male with extensive PMH including h/o liver transplant 2016 due to alcohol abuse, pAF (on chronic anticoagulation), history of diabetes mellitus type 2, CVA, hypertension, CHRISTIAN on BiPAP, h/o respiratory failure with parainfluenza and  strep pneumoniae with parapneumonic pleural effusion, h/o pericardial effusion who was sent from cardiology clinic as a direct admission for worsening pericardial effusion, admitted inpatient 9/27/23.     He has history of prolonged hospitalization early 2023. 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  He was recovering in LTACH and then had a prolonged hospital stay at [facility] (1/21/23 to 4/28/23) with loculated pleural effusion.         Final discharge diagnoses and hospital course     Worsening pericardial effusion  Status post pericardiocentesis and drain placement: 9/29/2023  -sent from cardiology clinic; ECHO 9/27 noted with moderate pericardial effusion but with no tamponade physiology, EF 60-65 %  -currently hemodynamically stable; not hypoxic  -Cardiology is consulted and following, and he is now status post pericardiocentesis, about 360 mL fluid removed, drain placement 150 ml came out positive and since then with no output from the pericardial drain.    Evaluated by the cardiology, repeat echocardiogram done which shows small to moderate-sized  posterior and lateral pericardial effusion, no tamponade.    Cardiology discussed with interventional cardiology they did not recommend any further manipulation of the drain or repeating a pericardiocentesis at this time.  The drain was later discontinued.        Repeat echocardiogram done today on 10/1/2023, was remain unchanged pericardial fusion remains unchanged as well.  Pericardial effusion likely because of his renal failure, cardiology has cleared him for discharge.  Patient will be discharged home in stable and improved condition, with outpatient follow-up with cardiology in outpatient echocardiogram order as per cardiology.    At the time of discharge feeling well, denies any fever chills chest pain headache dizziness lightheadedness no abdominal pain back pain dysuria hematuria constipation diarrhea     Patient has h/o prolonged hospitalization with respiratory failure/ARDS following parainfluenza and strep pneumoniae with b/l pleural effusion, respiratory failure requiring tracheostomy-- now resolved     ESRD on hemodialysis  H/o Left AV fistula pseudoaneurysm repair (8/2023 by vascular surgery)  -gets Monday, Wednesday, Friday dialysis; dialyzed 9/27/23  -has left upper extremity AV fistula; also right chest dialysis catheter  Nephrology is consulted and following, do dialysis Monday Wednesday and Friday     Chronic liver disease, history of liver transplant 2016  H/o SBP  -LFTs stable at this time  -will resume PTA tacrolimus when verified by pharmacy     History of seizure, on Keppra and Vimpat, resume when verified     Paroxysmal on atrial fibrillation  History of embolic strokes  H/o PEA arrest  H/o Chronic hypotension  -currently in sinus rhythm  -Resume Eliquis as no further procedures planned.  -Use midodrine on the day of dialysis, blood pressure stable at this time     GERD: resume PTA Protonix when verified     Clinically Significant Risk Factors Present on Admission                # Drug  Induced Coagulation Defect: home medication list includes an anticoagulant medication    # Hypertension: Noted on problem list      # Overweight: Estimated body mass index is 26.45 kg/m  as calculated from the following:    Height as of 9/21/23: 1.829 m (6').    Weight as of 9/21/23: 88.5 kg (195 lb).            Discharged home in stable improved condition  Juan Moeller MD, MD    Significant Results and Procedures   Pericardiocentesis       1.  Successful removal of 360 cc of bloody fluid.  2.  Pericardial drain secured in place       Pending Results   These results will be followed up by PCP-cardiology  Unresulted Labs Ordered in the Past 30 Days of this Admission       Date and Time Order Name Status Description    9/29/2023  1:06 PM Adenosine Deaminase Pericardial Fluid In process     9/29/2023 12:34 PM Pericardial Fluid Aerobic Bacterial Culture Routine Preliminary     9/29/2023 12:34 PM Cytology non gyn In process             Code Status   Full Code       Primary Care Physician   Ki Carter    Physical Exam   Temp: 98.5  F (36.9  C) Temp src: Oral BP: 104/69 Pulse: 87   Resp: 16 SpO2: 94 % O2 Device: None (Room air) Oxygen Delivery: 2 LPM  Vitals:    09/30/23 0613 10/01/23 0114 10/01/23 0900   Weight: 85.8 kg (189 lb 1.6 oz) 87.5 kg (192 lb 14.4 oz) 87.9 kg (193 lb 11.2 oz)     Vital Signs with Ranges  Temp:  [98  F (36.7  C)-99  F (37.2  C)] 98.5  F (36.9  C)  Pulse:  [83-87] 87  Resp:  [16-20] 16  BP: (100-117)/(65-69) 104/69  SpO2:  [88 %-97 %] 94 %  I/O last 3 completed shifts:  In: 90 [P.O.:90]  Out: 30 [Urine:30]    Constitutional: awake, alert, cooperative, no apparent distress, and appears stated age  Eyes: Lids and lashes normal, pupils equal, round and reactive to light, extra ocular muscles intact, sclera clear, conjunctiva normal  Respiratory: No increased work of breathing, good air exchange, clear to auscultation bilaterally, no crackles or wheezing  Cardiovascular:  normal S1 and S2, no  S3 or S4, and no murmur noted  Neurologic: No focal deficit    Discharge Disposition   Discharged to home  Condition at discharge: Stable    Consultations This Hospital Stay   CARDIOLOGY IP CONSULT  NEPHROLOGY IP CONSULT  CARE MANAGEMENT / SOCIAL WORK IP CONSULT    Time Spent on this Encounter   Juan LANDON MD, personally saw the patient today and spent greater than 30 minutes discharging this patient.    Discharge Orders      Follow-Up with Cardiology HARRY      Reason for your hospital stay    Pericardial effusion s/p pericardiocentesis     Follow-up and recommended labs and tests     Follow up with primary care provider, Ki Carter, within 7 days for hospital follow- up.  The following labs/tests are recommended: cbc,bmp.     Activity    Your activity upon discharge: activity as tolerated     Echocardiogram Complete    Administration of IV contrast will be tailored to this examination per the appropriate written protocol listed in the Echocardiography department Protocol Book, or by the supervising Cardiologist. This may result in an order change.    Use of contrast is at the discretion of the supervising Cardiologist.     Diet    Follow this diet upon discharge: Orders Placed This Encounter      Snacks/Supplements Pediatric: Other - Please comment; GelatinPlus; With Meals      Regular Diet Adult     Discharge Medications   Current Discharge Medication List        CONTINUE these medications which have NOT CHANGED    Details   acetaminophen (TYLENOL) 325 MG tablet Take 2 tablets (650 mg) by mouth every 4 hours as needed for other (For optimal non-opioid multimodal pain management to improve pain control.)    Associated Diagnoses: Dialysis AV fistula malfunction, sequela      apixaban ANTICOAGULANT (ELIQUIS) 5 MG tablet Take 1 tablet (5 mg) by mouth 2 times daily for 90 days  Qty: 180 tablet, Refills: 1    Associated Diagnoses: Paroxysmal atrial fibrillation (H)      gabapentin (NEURONTIN) 100 MG capsule  Take 1 capsule (100 mg) by mouth daily  Qty: 30 capsule, Refills: 2    Associated Diagnoses: Itching      hydrOXYzine (ATARAX) 25 MG tablet Take 1 tablet (25 mg) by mouth 3 times daily as needed for itching  Qty: 90 tablet, Refills: 0    Associated Diagnoses: Restless leg syndrome; Insomnia, unspecified type; Liver transplanted (H); Cirrhosis of liver with ascites, unspecified hepatic cirrhosis type (H)      !! Lacosamide (VIMPAT) 100 MG TABS tablet Take 1 tablet (100 mg) by mouth every evening  Qty: 31 tablet, Refills: 5    Associated Diagnoses: Seizures (H)      !! lacosamide (VIMPAT) 50 MG TABS tablet Take 50 mg by mouth every morning      melatonin 3 MG tablet Take 1 tablet (3 mg) by mouth At Bedtime  Qty: 90 tablet, Refills: 1    Associated Diagnoses: Insomnia, unspecified type      midodrine (PROAMATINE) 10 MG tablet Take 1 tab 3xDay, during patient's dialysis days , Monday, Wednesday, Friday, patient takes 2 extra Midodrine one hour before dialysis, takes 1 more Midodrine when he gets there, and takes 1 more tablet during dialysis, this is an addition to patient's scheduled doses  Qty: 150 tablet, Refills: 1    Associated Diagnoses: Hemodialysis-associated hypotension      multivitamin RENAL (TRIPHROCAPS) 1 capsule capsule Take 1 capsule by mouth daily  Qty: 30 capsule, Refills: 1    Associated Diagnoses: ESRD (end stage renal disease) on dialysis (H); Restless leg syndrome      oxyCODONE (ROXICODONE) 5 MG tablet Take 1 tablet (5 mg) by mouth every 4 hours as needed for moderate pain  Qty: 6 tablet, Refills: 0    Associated Diagnoses: Dialysis AV fistula malfunction, sequela      rOPINIRole (REQUIP) 0.5 MG tablet Take 0.5 mg by mouth 2 times daily as needed (restless legs)      sevelamer carbonate (RENVELA) 800 MG tablet Take 800 mg by mouth 4 times daily With meals and snacks      tacrolimus (GENERIC EQUIVALENT) 1 MG capsule Take 1 capsule (1 mg) by mouth every 12 hours  Qty: 60 capsule, Refills: 1     Associated Diagnoses: Liver transplanted (H)       !! - Potential duplicate medications found. Please discuss with provider.        Allergies   No Known Allergies  Data   Most Recent 3 CBC's:  Recent Labs   Lab Test 10/01/23  0631 09/29/23  0552 09/27/23  1750   WBC 4.2 2.8* 4.3   HGB 9.6* 9.3* 9.5*   MCV 98 97 97   PLT 71* 77* 109*      Most Recent 3 BMP's:  Recent Labs   Lab Test 10/01/23  0631 09/29/23  0552 09/27/23  1750   * 138 139   POTASSIUM 4.9 4.5 4.2   CHLORIDE 91* 97* 95*   CO2 27 22 30*   BUN 68.9* 87.9* 60.0*   CR 8.41* 8.87* 6.27*   ANIONGAP 16* 19* 14   KELLY 9.2 9.4 9.5   * 92 96     Most Recent 2 LFT's:  Recent Labs   Lab Test 09/27/23  1750 08/10/23  1032   AST 28 25   ALT 14 7   ALKPHOS 201* 196*   BILITOTAL 0.5 0.5     Most Recent INR's and Anticoagulation Dosing History:  Anticoagulation Dose History  More data exists         Latest Ref Rng & Units 11/14/2022 11/29/2022 12/1/2022 12/6/2022 12/16/2022 12/21/2022 8/16/2023   Recent Dosing and Labs   INR 0.85 - 1.15 1.50  1.47  1.26  1.17  1.16  1.14  1.58  1.36  1.29      Most Recent 3 Troponin's:No lab results found.  Most Recent Cholesterol Panel:  Recent Labs   Lab Test 12/21/22  1315   CHOL 89   LDL 41   HDL 27*   TRIG 106     Most Recent 6 Bacteria Isolates From Any Culture (See EPIC Reports for Culture Details):  Recent Labs   Lab Test 09/28/16  1600 09/23/16  1458 09/20/16  1901   CULT No VRE isolated No growth Culture negative for acid fast bacilli  Assayed at [facility] [address]    Culture negative after 4 weeks  No anaerobes isolated  No growth     Most Recent TSH, T4 and A1c Labs:  Recent Labs   Lab Test 08/10/23  1032 03/25/23  1058   TSH  --  4.89*   T4  --  0.92   A1C 4.7  --      Results for orders placed or performed during the hospital encounter of 09/27/23   Echocardiogram Limited     Value    LVEF  55-60%    Narrative    [id number]  [id number]  [id number]^JANEEN^RAULITO^PRINCE      [facility]  Echocardiography Laboratory  [address] [place] [address]     Name: MARY JO CRAIN  MRN: [id number]  : 1965  Study Date: 2023 12:17 PM  Age: 58 yrs  Gender: Male  Patient Location: [facility]  Reason For Study: Pericardial Effusion  Ordering Physician: RAULITO VERNON  Performed By: Kelly Myers     ______________________________________________________________________________  Procedure  Limited Portable Echo Adult.  ______________________________________________________________________________  Interpretation Summary     Prior to pericardiocentesis, there was a large pericardial effusion, mostly  posterior and lateral, 2.5-3 cm in thickness.     After removal of 360 ml of fluid from the pericardial space, a trivial  pericardial effusion remained.  ______________________________________________________________________________  Left Ventricle  The left ventricle is normal in size. Left ventricular systolic function is  normal. The visual ejection fraction is 55-60%.  ______________________________________________________________________________  Report approved by: Tereso Fuller 2023 03:03 PM     ______________________________________________________________________________      Echocardiogram Limited     Value    LVEF  55-60%    Narrative    [id number]  [id number]  [id number]  [id number]^JANEEN^RAULITO^PRINCE     [facility]  Echocardiography Laboratory  [address] [place] [id number]     Name: MARY JO CRAIN  MRN: [id number]  : 1965  Study Date: 2023 11:24 AM  Age: 58 yrs  Gender: Male  Patient Location: [facility]  Reason For Study: Pericardial Effusion  Ordering Physician: RAULITO VERNON  Performed By: Kelly Myers     BSA: 2.1 m2  Height: 72 in  Weight: 192 lb  HR: 72  BP: 105/69 mmHg  ______________________________________________________________________________  Procedure  Limited Portable Echo  Adult.  ______________________________________________________________________________  Interpretation Summary     Small to moderately sized posterior and lateral pericardial effusion.  There are no echocardiographic indications of cardiac tamponade.  By direct comparison with yesterday's study, effusion appears slightly  smaller.  ______________________________________________________________________________  Left Ventricle  The visual ejection fraction is 55-60%.     Aortic Valve  There is moderate trileaflet aortic sclerosis. There is trace to mild aortic  regurgitation.     Pulmonic Valve  The pulmonic valve is not well seen, but is grossly normal.     Vessels  The ascending aorta is Mildly dilated.     Pericardium  Small posterior pericardial effusion. There are no echocardiographic  indications of cardiac tamponade.     Rhythm  Sinus rhythm was noted.  ______________________________________________________________________________  MMode/2D Measurements & Calculations  asc Aorta Diam: 4.0 cm  Asc Ao diam index BSA (cm/m2): 1.9  Asc Ao diam index Ht(cm/m): 2.2     ______________________________________________________________________________  Report approved by: Tereso Myers 2023 11:53 AM         Echocardiogram Limited     Value    LVEF  60-65%    Narrative    [id number]  [id number]  [id number]  [id number]^LEROY^PRISCILLA     [facility]  Echocardiography Laboratory  [address] [place] [address]     Name: MARY JO CRAIN  MRN: [id number]  : 1965  Study Date: 10/01/2023 07:57 AM  Age: 58 yrs  Gender: Male  Patient Location: [facility]  Reason For Study: Pericardial Effusion  Ordering Physician: PRISCILLA HARPER  Referring Physician: Ki Carter  Performed By: Cory Burnett     BSA: 2.1 m2  Height: 72 in  Weight: 189 lb  HR: 86  BP: 100/65 mmHg  ______________________________________________________________________________  Procedure  Limited Portable Echo  Adult.  ______________________________________________________________________________  Interpretation Summary     1. Moderate size, pericardial effusion, mostly noted around the left  ventricle. Doppler hemodynamics not assessed.  2. Normal biventricular size and function. Left ventricular ejection fraction  of 60-65%.  Compared to the study from 9/30/2023, no change.  ______________________________________________________________________________  Left Ventricle  Left ventricular systolic function is normal. The visual ejection fraction is  60-65%.     Right Ventricle  The right ventricle is normal in size and function.     Rhythm  Sinus rhythm was noted.     ______________________________________________________________________________  MMode/2D Measurements & Calculations  IVSd: 1.1 cm  LVIDd: 4.8 cm  LVIDs: 3.3 cm  LVPWd: 0.98 cm  FS: 30.6 %  LV mass(C)d: 176.4 grams  LV mass(C)dI: 84.8 grams/m2  LA dimension: 3.5 cm  RWT: 0.41     ______________________________________________________________________________  Report approved by: Tereso Ambrosio 10/01/2023 09:02 AM         Cardiac Catheterization    Narrative    1.  Successful removal of 360 cc of bloody fluid.  2.  Pericardial drain secured in place       *Note: Due to a large number of results and/or encounters for the requested time period, some results have not been displayed. A complete set of results can be found in Results Review.     Most Recent 3 CBC's:  Recent Labs   Lab Test 10/01/23  0631 09/29/23  0552 09/27/23  1750   WBC 4.2 2.8* 4.3   HGB 9.6* 9.3* 9.5*   MCV 98 97 97   PLT 71* 77* 109*     Most Recent 3 BMP's:  Recent Labs   Lab Test 10/01/23  0631 09/29/23  0552 09/27/23  1750   * 138 139   POTASSIUM 4.9 4.5 4.2   CHLORIDE 91* 97* 95*   CO2 27 22 30*   BUN 68.9* 87.9* 60.0*   CR 8.41* 8.87* 6.27*   ANIONGAP 16* 19* 14   KELLY 9.2 9.4 9.5   * 92 96     Most Recent 2 LFT's:  Recent Labs   Lab Test 09/27/23  1750 08/10/23  1032    AST 28 25   ALT 14 7   ALKPHOS 201* 196*   BILITOTAL 0.5 0.5

## 2023-10-01 NOTE — PROVIDER NOTIFICATION
Provider notification    Notified person: Juan Moeller MD     Platform: [employer]    Message: paged Dr. Gregorio but haven't heard anything back, pt wants to leave, continue to try to reach him or ok to discharge pt?

## 2023-10-01 NOTE — PROGRESS NOTES
Renal Medicine       Following for ESRD  MWF dialysis schedule  Ran as scheduled 09/29/23    Comfortable  No 02   No pericardial drain    Dialysis 10/02/23  Orders placed     Probable discharge today  Should return to [facility] 10/02/23  Patient aware      Left message at outpatient unit:  No heparin on dialysis       Recent Labs   Lab 10/01/23  0631 09/29/23  0552 09/27/23  1750   POTASSIUM 4.9 4.5 4.2           MIKEL Reilly    [facility]  [phone]

## 2023-10-01 NOTE — PLAN OF CARE
Goal Outcome Evaluation:  A&O x4, VSS on RA. Desat to 86-88% while asleep. Sat 90s on 2L/NC while as sleep. Encouraged pulmonary hygiene. Old josé luis drain site CDI. Lethargic, sleeps between cares, more awake as the shift progresses. L arm fistula +bruit/thrill. Pain managed with prn tylenol. Plans for limited echo today.

## 2023-10-01 NOTE — PLAN OF CARE
Patient discharged at 1:30 pm to home. IV was discontinued. Pain at time of discharge was 0/10. Belongings returned to patient. Discharge instructions and medications reviewed with patient. Patient verbalized understanding and all questions were answered. At time of discharge, patient condition was stable and left the unit in wheelchair escorted by DANELLE.

## 2023-10-01 NOTE — PROGRESS NOTES
[facility]  Cardiology Progress Note  Date of Service: 10/01/2023  Primary Cardiologist: Dr. Abreu    Assessment & Plan    Blanco Osborne is a 58 year old male with past medical history significant for liver transplant and chronic thrombocytopenia, end-stage renal disease on dialysis awaiting transplant, paroxysmal A-fib on Eliquis, hypotension on midodrine, sleep apnea on BiPAP, history of CVA, history of respiratory failure due to various organisms, history of EtOH abuse admitted on 9/27/2023 with increasing size pericardial effusion on outpatient echocardiogram, admitted given progression of size for the planned pericardiocentesis. He is now s/p pericardiocentesis on 9/29 with drain pull 9/30.       Pericardial effusion   2.  Paroxysmal atrial fibrillation on apixaban which has been withheld for the pericardiocentesis  3.  End-stage renal disease on hemodialysis  4.  S/p liver transplant with cirrhosis and chronic thrombocytopenia  5.  Low blood pressure on chronic midodrine with dialysis Monday Wednesday and Friday    Plan:     Limited TTE this AM to assess effusion size, resume eliquis pending TTE  If no change in effusion the patient is OK to discharge with repeat TTE in one month and CV follow up (ordered)     ADDENDUM:   Repeat TTE showed unchanged posterior effusion. OK to discharge from a CV standpoint with previously outlined follow up.     Interval History   Patient feeling OK, has had diffuse body pains for months which are unchanged. CP improved with pericardial drain removal. No new SOB or lightheadedness with exertion     Physical Exam   Temp: 98.8  F (37.1  C) Temp src: Oral BP: 100/65 Pulse: 84   Resp: 20 SpO2: 95 % O2 Device: Nasal cannula Oxygen Delivery: 2 LPM  Vitals:    09/29/23 0548 09/30/23 0613 10/01/23 0114   Weight: 87.5 kg (192 lb 14.4 oz) 85.8 kg (189 lb 1.6 oz) 87.5 kg (192 lb 14.4 oz)     General: Appears chronicall unwell  Neck: JVP elevated  Chest: Slight  firm soft tissue prominence on chest directly no erythema   CV: RRR, no rubs, soft systolic murmur  Extremities: No LE edema    Clinically Significant Risk Factors             # Anion Gap Metabolic Acidosis: Highest Anion Gap = 19 mmol/L in last 2 days, will monitor and treat as appropriate      # Thrombocytopenia: Lowest platelets = 77 in last 2 days, will monitor for bleeding     # Hypertension: Noted on problem list        # Overweight: Estimated body mass index is 26.16 kg/m  as calculated from the following:    Height as of this encounter: 1.829 m (6').    Weight as of this encounter: 87.5 kg (192 lb 14.4 oz).  , PRESENT ON ADMISSION           Cardiac Arrhythmia: Atrial fibrillation: Paroxysmal    Fluid overload, unspecified    Liver failure with history of transplant    CKD POA List: ESRD on dialysis            Medications      - MEDICATION INSTRUCTIONS for Dialysis Patients -   Does not apply See Admin Instructions    [Held by provider] apixaban ANTICOAGULANT  5 mg Oral BID    gabapentin  100 mg Oral QPM    Lacosamide  100 mg Oral At Bedtime    lacosamide  50 mg Oral QAM    midodrine  10 mg Oral 3 times per day on Mon Wed Fri    senna-docusate  1 tablet Oral BID    Or    senna-docusate  2 tablet Oral BID    sevelamer carbonate  800 mg Oral 4x Daily    tacrolimus  1 mg Oral Q12H       Data   Last 24 hours labs reviewed

## 2023-10-02 ENCOUNTER — TELEPHONE (OUTPATIENT)
Dept: CARDIOLOGY | Facility: CLINIC | Age: 58
End: 2023-10-02
Payer: COMMERCIAL

## 2023-10-02 NOTE — TELEPHONE ENCOUNTER
"Patient was admitted to [facility] on 9/27/23 with increasing size pericardial effusion on outpatient echocardiogram, admitted given progression of size for the planned pericardiocentesis.    PMH:  liver transplant and chronic thrombocytopenia, end-stage renal disease on dialysis awaiting transplant, paroxysmal A-fib on Eliquis, hypotension on Midodrine, sleep apnea on BiPAP, history of CVA, history of respiratory failure due to various organisms, history of ETOH abuse. admitted on 9/27/2023 with increasing size pericardial effusion on outpatient echocardiogram, admitted given progression of size for the planned pericardiocentesis.     9/27/23: Echo showed EF of 60%. Moderate, apical posterior pericardial effusion without signs of ventricular interdependence/tamponade. Normal inferior vena cava. Normal right ventricular size and systolic function. Normal left ventricular size and systolic function. No regional wall motion abnormalities noted. Mild aortic valve stenosis. Trace mitral and tricuspid valve regurgitation.    9/29/23: s/p pericardiocentesis with 360 ml fluid removed. Suspect secondary to renal failure and dialysis     9/30/23: Echo showed EF of 55%. Small to moderately sized posterior and lateral pericardial effusion. Pericardial drain pulled.    10/1/23: Limited TTE showed EF of 60%. Normal biventricular size and function. Moderate size, pericardial effusion, mostly noted around the left ventricle. Doppler hemodynamics not assessed. Compared to the study from 9/30/2023, no change.    No medication changes made at time of discharge.     Called patient to discuss any post hospital d/c questions he may have,and confirm f/u appts. Phone call answered by his wife, with pt on speaker phone.    Patient denied any SOB, chest pain, or light headedness. Pt has been c/o fatigue since returning from dialysis earlier today. Wife states, \"His BP was low at dialysis, so they pulled less fluid today.\" States his HR is 60 and " 02 sats 93% RA. Wife states unable to check his home BP as machine not working. Asking writer if pt should take Midodrine. Encouraged wife to speak wife pt's Nephrology team or PMD for further directions regarding Midodrine.    Left anterior pericardial site is healing without signs of infection, swelling or bleeding.    RN confirmed with wife that pt  needs to be scheduled for a post discharged echo and ANA M follow up OV. PTA was scheduled for cardiology return on 11/6/23 at 1450 with ANA M Brie Ocasio at our [place] Office. Dr. Abreu's Team RN and scheduling phone numbers provided.    Advised to call clinic with any cardiac related questions or concerns prior to this ana m't and wife verbalized understanding and agreed with plan. SCOTT Acosta RN.

## 2023-10-03 ENCOUNTER — HOSPITAL ENCOUNTER (EMERGENCY)
Facility: CLINIC | Age: 58
Discharge: HOME | End: 2023-10-03
Attending: EMERGENCY MEDICINE | Admitting: EMERGENCY MEDICINE
Payer: COMMERCIAL

## 2023-10-03 VITALS
RESPIRATION RATE: 16 BRPM | TEMPERATURE: 98.1 F | WEIGHT: 190 LBS | HEIGHT: 72 IN | SYSTOLIC BLOOD PRESSURE: 109 MMHG | OXYGEN SATURATION: 98 % | HEART RATE: 74 BPM | BODY MASS INDEX: 25.73 KG/M2 | DIASTOLIC BLOOD PRESSURE: 70 MMHG

## 2023-10-03 DIAGNOSIS — N18.6 END-STAGE RENAL DISEASE NEEDING DIALYSIS (H): ICD-10-CM

## 2023-10-03 DIAGNOSIS — Z99.2 END-STAGE RENAL DISEASE NEEDING DIALYSIS (H): ICD-10-CM

## 2023-10-03 LAB
ANION GAP SERPL CALCULATED.3IONS-SCNC: 19 MMOL/L (ref 7–15)
ATRIAL RATE - MUSE: 77 BPM
BASO+EOS+MONOS # BLD AUTO: ABNORMAL 10*3/UL
BASO+EOS+MONOS NFR BLD AUTO: ABNORMAL %
BASOPHILS # BLD AUTO: 0 10E3/UL (ref 0–0.2)
BASOPHILS NFR BLD AUTO: 0 %
BUN SERPL-MCNC: 73.8 MG/DL (ref 8–23)
CALCIUM SERPL-MCNC: 9 MG/DL (ref 8.6–10)
CHLORIDE SERPL-SCNC: 94 MMOL/L (ref 98–107)
CREAT SERPL-MCNC: 8.64 MG/DL (ref 0.67–1.17)
DEPRECATED HCO3 PLAS-SCNC: 24 MMOL/L (ref 22–29)
DIASTOLIC BLOOD PRESSURE - MUSE: NORMAL MMHG
EGFRCR SERPLBLD CKD-EPI 2021: 7 ML/MIN/1.73M2
EOSINOPHIL # BLD AUTO: 0.2 10E3/UL (ref 0–0.7)
EOSINOPHIL NFR BLD AUTO: 4 %
ERYTHROCYTE [DISTWIDTH] IN BLOOD BY AUTOMATED COUNT: 14.6 % (ref 10–15)
GLUCOSE SERPL-MCNC: 113 MG/DL (ref 70–99)
HCT VFR BLD AUTO: 30.7 % (ref 40–53)
HGB BLD-MCNC: 9.8 G/DL (ref 13.3–17.7)
IMM GRANULOCYTES # BLD: 0 10E3/UL
IMM GRANULOCYTES NFR BLD: 0 %
INTERPRETATION ECG - MUSE: NORMAL
LYMPHOCYTES # BLD AUTO: 1.7 10E3/UL (ref 0.8–5.3)
LYMPHOCYTES NFR BLD AUTO: 36 %
MCH RBC QN AUTO: 31 PG (ref 26.5–33)
MCHC RBC AUTO-ENTMCNC: 31.9 G/DL (ref 31.5–36.5)
MCV RBC AUTO: 97 FL (ref 78–100)
MONOCYTES # BLD AUTO: 0.7 10E3/UL (ref 0–1.3)
MONOCYTES NFR BLD AUTO: 15 %
NEUTROPHILS # BLD AUTO: 2.1 10E3/UL (ref 1.6–8.3)
NEUTROPHILS NFR BLD AUTO: 45 %
NRBC # BLD AUTO: 0 10E3/UL
NRBC BLD AUTO-RTO: 0 /100
P AXIS - MUSE: 57 DEGREES
PLATELET # BLD AUTO: 93 10E3/UL (ref 150–450)
POTASSIUM SERPL-SCNC: 4.5 MMOL/L (ref 3.4–5.3)
PR INTERVAL - MUSE: 196 MS
QRS DURATION - MUSE: 80 MS
QT - MUSE: 424 MS
QTC - MUSE: 479 MS
R AXIS - MUSE: 75 DEGREES
RBC # BLD AUTO: 3.16 10E6/UL (ref 4.4–5.9)
SODIUM SERPL-SCNC: 137 MMOL/L (ref 135–145)
SYSTOLIC BLOOD PRESSURE - MUSE: NORMAL MMHG
T AXIS - MUSE: 37 DEGREES
VENTRICULAR RATE- MUSE: 77 BPM
WBC # BLD AUTO: 4.8 10E3/UL (ref 4–11)

## 2023-10-03 PROCEDURE — 99283 EMERGENCY DEPT VISIT LOW MDM: CPT | Performed by: EMERGENCY MEDICINE

## 2023-10-03 PROCEDURE — 85025 COMPLETE CBC W/AUTO DIFF WBC: CPT | Performed by: EMERGENCY MEDICINE

## 2023-10-03 PROCEDURE — 80048 BASIC METABOLIC PNL TOTAL CA: CPT | Performed by: EMERGENCY MEDICINE

## 2023-10-03 PROCEDURE — 36415 COLL VENOUS BLD VENIPUNCTURE: CPT | Performed by: EMERGENCY MEDICINE

## 2023-10-03 ASSESSMENT — ACTIVITIES OF DAILY LIVING (ADL)
ADLS_ACUITY_SCORE: 33
ADLS_ACUITY_SCORE: 33

## 2023-10-03 NOTE — ED TRIAGE NOTES
Patient presents to the ED for evaluation of chest and head pain. Pt s/p pericardiocentesis on Friday 9/29 c/b extreme pain. Pt currently being evaluated for kidney TxP, s/p liver TxP 2016. Last dialysis 10/2, however they were unable to due full run r/t hypotension.     /70   Pulse 74   Temp 98.1  F (36.7  C) (Oral)   Resp 16   Ht 1.829 m (6')   Wt 86.2 kg (190 lb)   SpO2 98%   BMI 25.77 kg/m         Triage Assessment       Row Name 10/03/23 1431       Triage Assessment (Adult)    Airway WDL WDL       Respiratory WDL    Respiratory WDL WDL       Skin Circulation/Temperature WDL    Skin Circulation/Temperature WDL WDL       Cardiac WDL    Cardiac WDL chest pain;X       Chest Pain Assessment    Chest Pain Location midsternal    Character aching       Peripheral/Neurovascular WDL    Peripheral Neurovascular WDL WDL       Cognitive/Neuro/Behavioral WDL    Cognitive/Neuro/Behavioral WDL WDL

## 2023-10-03 NOTE — ED PROVIDER NOTES
ED Provider Note  [facility]      History     Chief Complaint   Patient presents with    Altered Mental Status    Chest Pain     HPI  Blanco Osborne is a 58 year old male cirrhosis of liver, s/p liver transplant, immunosuppressed status, HTN, HLD, SBP, respiratory arrest, SP bariatric LST Aerius infection, embolic stroke, PEA, seizures, acute on chronic respiratory failure with hypoxia, ESRD, who to the ER for evaluation of altered mental status.    Patient was not able to have a full dialysis run on Monday because his blood pressure was too low.  He got really confused last night.  He is transitioning from the venous catheter.  His nephrologist is aware that he is at the ER today.  They recommend he come get his labs drawn for evaluation.  He has been partially dialyzed.  He is feeling dizzy.  Friday patient had a pericardiocentesis and dialysis, was hospitalized over the weekend on Dilaudid and hydrocodone.    Past Medical History  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    Cancer (H) 2023    Squamous Cell Cancer- Since resolved    Cirrhosis of liver with ascites (H) 02/11/2016    Coagulopathy (H24)     Critical illness myopathy     Dialysis patient (H24)     DIALYSIS 3X/ WEEK    Encephalopathy     ESRD (end stage renal disease) on dialysis (H)     Gastroesophageal reflux disease     H/O alcohol abuse     History of blood transfusion     Hyperammonemia (H24)     Hyperlipidemia     Hypertension     Patients states that HTN has been resolved    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    Insomnia     Lactic acidosis 11/12/2022    Liver replaced by transplant (H) 09/20/2016    NAFLD (nonalcoholic fatty liver disease) 02/11/2016     CHRISTIAN on CPAP     Parainfluenza type 1 infection 11/19/2022    Parapneumonic effusion     Pleural effusion     Pneumothorax on left 12/16/2022    Respiratory acidosis 11/12/2022    Respiratory arrest (H) 11/12/2022    Restless legs syndrome (RLS)     SBP (spontaneous bacterial peritonitis) (H)     MNGI    Seizures (H) 12/2022    Sepsis due to Streptococcus pneumoniae with acute hypoxic respiratory failure (H) 11/19/2022    Stroke, embolic (H) 11/20/2022    Sudden cardiac arrest (H) 12/29/2022    PEA- 2 min of CPR    Tubular adenoma 10/2019    Large cecal adenoma- due for surveillance colonoscopy in 3 years (10/2022)     Past Surgical History:   Procedure Laterality Date    APPENDECTOMY      BENCH LIVER N/A 09/20/2016    Procedure: BENCH LIVER;  Surgeon: Enoc Crews MD;  Location: [facility] OR    BRONCHOSCOPY FLEXIBLE AND RIGID N/A 12/20/2022    Procedure: BRONCHOSCOPY;  Surgeon: Alena Valenzueal MD;  Location:  GI    COLONOSCOPY  08/06/2013    repeat in 2018    COLONOSCOPY N/A 10/04/2019    Procedure: COLONOSCOPY, WITH POLYPECTOMY AND BIOPSY;  Surgeon: Go Chong MD;  Location: [facility] OR    CREATE FISTULA ARTERIOVENOUS UPPER EXTREMITY Left 03/21/2023    Procedure: LEFT UPPER EXTREMITY ARTERIOVENOUS FISTULA CREATION. LIGATION OF COMPETING VASCULAR BRANCHES- LEFT;  Surgeon: Deejay Mcneil MD;  Location:  OR    CV PERICARDIOCENTESIS N/A 9/29/2023    Procedure: Pericardiocentesis;  Surgeon: Barry Mckeon MD;  Location:  HEART CARDIAC CATH LAB    HERNIA REPAIR      IR CHEST TUBE PLACEMENT NON-TUNNELED RIGHT  12/12/2022    IR CVC TUNNEL PLACEMENT > 5 YRS OF AGE  12/06/2022    IR DIALYSIS FISTULOGRAM LEFT  07/13/2023    IR GASTROSTOMY TUBE CHANGE  02/08/2023    IR GASTROSTOMY TUBE PERCUTANEOUS PLCMNT  11/29/2022    IR PARACENTESIS  11/29/2022    IR PARACENTESIS  12/01/2022    IR THORACENTESIS  12/06/2022    IR THORACENTESIS  09/01/2020    IR THORACENTESIS  08/10/2020    IR  TRANSCATHETER BIOPSY  2022    REVISION FISTULA ARTERIOVENOUS UPPER EXTREMITY Left 8/15/2023    Procedure: 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 2; OUTFLOW REVISION CEPHALIC TO BRACHIAL VEIN;  Surgeon: Deejay Mcneil MD;  Location: [facility] OR    TRACHEOSTOMY N/A 2022    Procedure: TRACHEOSTOMY;  Surgeon: Nilesh Jackson MD;  Location: [facility] OR    TRANSPLANT LIVER RECIPIENT  DONOR N/A 2016    Procedure: TRANSPLANT LIVER RECIPIENT  DONOR;  Surgeon: Enoc Crews MD;  Location: [facility] OR     acetaminophen (TYLENOL) 325 MG tablet  apixaban ANTICOAGULANT (ELIQUIS) 5 MG tablet  gabapentin (NEURONTIN) 100 MG capsule  hydrOXYzine (ATARAX) 25 MG tablet  Lacosamide (VIMPAT) 100 MG TABS tablet  lacosamide (VIMPAT) 50 MG TABS tablet  melatonin 3 MG tablet  midodrine (PROAMATINE) 10 MG tablet  multivitamin RENAL (TRIPHROCAPS) 1 capsule capsule  oxyCODONE (ROXICODONE) 5 MG tablet  rOPINIRole (REQUIP) 0.5 MG tablet  sevelamer carbonate (RENVELA) 800 MG tablet  tacrolimus (GENERIC EQUIVALENT) 1 MG capsule      No Known Allergies  Family History  Family History   Problem Relation Age of Onset    Coronary Artery Disease No family hx of     Cardiomyopathy No family hx of      Social History   Social History     Tobacco Use    Smoking status: Never    Smokeless tobacco: Never   Vaping Use    Vaping Use: Never used   Substance Use Topics    Alcohol use: Not Currently     Alcohol/week: 0.0 standard drinks of alcohol    Drug use: No         A medically appropriate review of systems was performed with pertinent positives and negatives noted in the HPI, and all other systems negative.    Physical Exam   BP: 109/70  Pulse: 74  Temp: 98.1  F (36.7  C)  Resp: 16  Height: 182.9 cm (6')  Weight: 86.2 kg (190 lb)  SpO2: 98 %  Physical Exam  Vitals and nursing note reviewed.   Constitutional:       General: He is not in acute distress.     Appearance: He is well-developed.   HENT:      Head:  Normocephalic and atraumatic.      Mouth/Throat:      Mouth: Mucous membranes are moist.   Eyes:      General: No scleral icterus.     Conjunctiva/sclera: Conjunctivae normal.      Pupils: Pupils are equal, round, and reactive to light.   Cardiovascular:      Rate and Rhythm: Normal rate and regular rhythm.      Heart sounds: Normal heart sounds.   Pulmonary:      Effort: Pulmonary effort is normal. No respiratory distress.      Breath sounds: Normal breath sounds. No wheezing.   Abdominal:      General: Abdomen is flat.      Palpations: Abdomen is soft.   Musculoskeletal:      Cervical back: Neck supple.   Skin:     General: Skin is warm and dry.   Neurological:      General: No focal deficit present.      Mental Status: He is alert and oriented to person, place, and time.      Cranial Nerves: No cranial nerve deficit.   Psychiatric:         Mood and Affect: Mood normal.         Behavior: Behavior normal.         ED Course, Procedures, & Data      Procedures                Results for orders placed or performed during the hospital encounter of 10/03/23   Basic metabolic panel     Status: Abnormal   Result Value Ref Range    Sodium 137 135 - 145 mmol/L    Potassium 4.5 3.4 - 5.3 mmol/L    Chloride 94 (L) 98 - 107 mmol/L    Carbon Dioxide (CO2) 24 22 - 29 mmol/L    Anion Gap 19 (H) 7 - 15 mmol/L    Urea Nitrogen 73.8 (H) 8.0 - 23.0 mg/dL    Creatinine 8.64 (H) 0.67 - 1.17 mg/dL    GFR Estimate 7 (L) >60 mL/min/1.73m2    Calcium 9.0 8.6 - 10.0 mg/dL    Glucose 113 (H) 70 - 99 mg/dL   CBC with platelets and differential     Status: Abnormal   Result Value Ref Range    WBC Count 4.8 4.0 - 11.0 10e3/uL    RBC Count 3.16 (L) 4.40 - 5.90 10e6/uL    Hemoglobin 9.8 (L) 13.3 - 17.7 g/dL    Hematocrit 30.7 (L) 40.0 - 53.0 %    MCV 97 78 - 100 fL    MCH 31.0 26.5 - 33.0 pg    MCHC 31.9 31.5 - 36.5 g/dL    RDW 14.6 10.0 - 15.0 %    Platelet Count 93 (L) 150 - 450 10e3/uL    % Neutrophils 45 %    % Lymphocytes 36 %    % Monocytes  15 %    Mids % (Monos, Eos, Basos)      % Eosinophils 4 %    % Basophils 0 %    % Immature Granulocytes 0 %    NRBCs per 100 WBC 0 <1 /100    Absolute Neutrophils 2.1 1.6 - 8.3 10e3/uL    Absolute Lymphocytes 1.7 0.8 - 5.3 10e3/uL    Absolute Monocytes 0.7 0.0 - 1.3 10e3/uL    Mids Abs (Monos, Eos, Basos)      Absolute Eosinophils 0.2 0.0 - 0.7 10e3/uL    Absolute Basophils 0.0 0.0 - 0.2 10e3/uL    Absolute Immature Granulocytes 0.0 <=0.4 10e3/uL    Absolute NRBCs 0.0 10e3/uL   CBC with platelets differential     Status: Abnormal    Narrative    The following orders were created for panel order CBC with platelets differential.  Procedure                               Abnormality         Status                     ---------                               -----------         ------                     CBC with platelets and d...[[id number]]  Abnormal            Final result                 Please view results for these tests on the individual orders.     Medications - No data to display  Labs Ordered and Resulted from Time of ED Arrival to Time of ED Departure   BASIC METABOLIC PANEL - Abnormal       Result Value    Sodium 137      Potassium 4.5      Chloride 94 (*)     Carbon Dioxide (CO2) 24      Anion Gap 19 (*)     Urea Nitrogen 73.8 (*)     Creatinine 8.64 (*)     GFR Estimate 7 (*)     Calcium 9.0      Glucose 113 (*)    CBC WITH PLATELETS AND DIFFERENTIAL - Abnormal    WBC Count 4.8      RBC Count 3.16 (*)     Hemoglobin 9.8 (*)     Hematocrit 30.7 (*)     MCV 97      MCH 31.0      MCHC 31.9      RDW 14.6      Platelet Count 93 (*)     % Neutrophils 45      % Lymphocytes 36      % Monocytes 15      Mids % (Monos, Eos, Basos)        % Eosinophils 4      % Basophils 0      % Immature Granulocytes 0      NRBCs per 100 WBC 0      Absolute Neutrophils 2.1      Absolute Lymphocytes 1.7      Absolute Monocytes 0.7      Mids Abs (Monos, Eos, Basos)        Absolute Eosinophils 0.2      Absolute Basophils 0.0      Absolute  Immature Granulocytes 0.0      Absolute NRBCs 0.0       No orders to display          Critical care was not performed.     Medical Decision Making  The patient's presentation was of moderate complexity (a chronic illness mild to moderate exacerbation, progression, or side effect of treatment).    The patient's evaluation involved:  ordering and/or review of 3+ test(s) in this encounter (CBC, metabolic panel)    The patient's management necessitated only low risk treatment.  No current indication for emergent dialysis patient would like to go home and follow-up at for his regular run tomorrow    Assessment & Plan    Advised to come in after incomplete dialysis run yesterday to have his labs checked he is not seem to be fluid overloaded is not hypoxic his potassium is normal I think he can follow-up tomorrow for his usual dialysis run through his Christianson catheter.  He is alert afebrile with normal vital signs.    I have reviewed the nursing notes. I have reviewed the findings, diagnosis, plan and need for follow up with the patient.    New Prescriptions    No medications on file       Final diagnoses:   End-stage renal disease needing dialysis (H)     I, Laura London, am serving as a trained medical scribe to document services personally performed by Geronimo Solares MD, based on the provider's statements to me.     I, Geronimo Solares MD, was physically present and have reviewed and verified the accuracy of this note documented by Laura London.    Geronimo Solares MD  [facility] EMERGENCY DEPARTMENT  10/3/2023     Geronimo Solares MD  10/03/23 1832

## 2023-10-04 ENCOUNTER — TELEPHONE (OUTPATIENT)
Dept: FAMILY MEDICINE | Facility: CLINIC | Age: 58
End: 2023-10-04
Payer: COMMERCIAL

## 2023-10-04 DIAGNOSIS — N18.6 ESRD (END STAGE RENAL DISEASE) ON DIALYSIS (H): ICD-10-CM

## 2023-10-04 DIAGNOSIS — R14.0 ABDOMINAL DISTENTION: ICD-10-CM

## 2023-10-04 DIAGNOSIS — Z94.4 LIVER TRANSPLANTED (H): Primary | ICD-10-CM

## 2023-10-04 DIAGNOSIS — Z99.2 ESRD (END STAGE RENAL DISEASE) ON DIALYSIS (H): ICD-10-CM

## 2023-10-04 LAB — BACTERIA PCAR CULT: NO GROWTH

## 2023-10-04 NOTE — TELEPHONE ENCOUNTER
Message relayed to patient that US order is signed. Given Radiology scheduling number.     PETER Latham  [facility] Triage

## 2023-10-04 NOTE — TELEPHONE ENCOUNTER
"Received a call from the patient's wife, Ofe, stating the patient's dialysis MD (Dr. Galindo), recommended the patient to have an Abdominal US completed to assess for fluid in the abdomen.     Upon chart review, patient was seen in the ER yesterday due to AMS. Ofe states the experience was \"awful\" and all they did for the patient was run some blood work.     Ofe states she tired to schedule the US today but there is no order. Ofe is wondering if PCP would be willing to place the Abdominal US order.     Will route to PCP for review. Abdominal US order pended.     Can we leave a detailed message on this number? YES  Phone number patient can be reached at: Cell number on file:    Telephone Information:   Mobile [phone]       Melissa Arevalo RN  [facility] Triage  "

## 2023-10-05 ENCOUNTER — APPOINTMENT (OUTPATIENT)
Dept: ULTRASOUND IMAGING | Facility: CLINIC | Age: 58
DRG: 871 | End: 2023-10-05
Attending: INTERNAL MEDICINE
Payer: COMMERCIAL

## 2023-10-05 ENCOUNTER — ANCILLARY PROCEDURE (OUTPATIENT)
Dept: ULTRASOUND IMAGING | Facility: CLINIC | Age: 58
End: 2023-10-05
Attending: EMERGENCY MEDICINE
Payer: COMMERCIAL

## 2023-10-05 ENCOUNTER — APPOINTMENT (OUTPATIENT)
Dept: CT IMAGING | Facility: CLINIC | Age: 58
DRG: 871 | End: 2023-10-05
Attending: EMERGENCY MEDICINE
Payer: COMMERCIAL

## 2023-10-05 ENCOUNTER — APPOINTMENT (OUTPATIENT)
Dept: CARDIOLOGY | Facility: CLINIC | Age: 58
DRG: 871 | End: 2023-10-05
Attending: EMERGENCY MEDICINE
Payer: COMMERCIAL

## 2023-10-05 ENCOUNTER — HOSPITAL ENCOUNTER (INPATIENT)
Facility: CLINIC | Age: 58
LOS: 18 days | Discharge: ANOTHER HOSPITAL | DRG: 871 | End: 2023-10-23
Attending: EMERGENCY MEDICINE | Admitting: INTERNAL MEDICINE
Payer: COMMERCIAL

## 2023-10-05 DIAGNOSIS — I26.94 MULTIPLE SUBSEGMENTAL PULMONARY EMBOLI WITHOUT ACUTE COR PULMONALE (H): ICD-10-CM

## 2023-10-05 DIAGNOSIS — R91.8 PULMONARY INFILTRATES: ICD-10-CM

## 2023-10-05 DIAGNOSIS — A41.9 SEVERE SEPSIS (H): ICD-10-CM

## 2023-10-05 DIAGNOSIS — N18.6 ESRD (END STAGE RENAL DISEASE) ON DIALYSIS (H): ICD-10-CM

## 2023-10-05 DIAGNOSIS — R00.0 WIDE-COMPLEX TACHYCARDIA: ICD-10-CM

## 2023-10-05 DIAGNOSIS — R65.20 SEVERE SEPSIS (H): ICD-10-CM

## 2023-10-05 DIAGNOSIS — R07.9 CHEST PAIN, UNSPECIFIED TYPE: Primary | ICD-10-CM

## 2023-10-05 DIAGNOSIS — I48.91 ATRIAL FIBRILLATION WITH RAPID VENTRICULAR RESPONSE (H): ICD-10-CM

## 2023-10-05 DIAGNOSIS — Z99.2 ESRD (END STAGE RENAL DISEASE) ON DIALYSIS (H): ICD-10-CM

## 2023-10-05 DIAGNOSIS — R79.89 ELEVATED TROPONIN: ICD-10-CM

## 2023-10-05 DIAGNOSIS — I31.39 PERICARDIAL EFFUSION: ICD-10-CM

## 2023-10-05 LAB
ABO/RH(D): NORMAL
ALBUMIN SERPL BCG-MCNC: 3.9 G/DL (ref 3.5–5.2)
ALP SERPL-CCNC: 252 U/L (ref 40–129)
ALT SERPL W P-5'-P-CCNC: 13 U/L (ref 0–70)
AMMONIA PLAS-SCNC: 14 UMOL/L (ref 16–60)
ANION GAP SERPL CALCULATED.3IONS-SCNC: 22 MMOL/L (ref 7–15)
ANTIBODY SCREEN: NEGATIVE
APTT PPP: 48 SECONDS (ref 22–38)
APTT PPP: 67 SECONDS (ref 22–38)
APTT PPP: 89 SECONDS (ref 22–38)
AST SERPL W P-5'-P-CCNC: 17 U/L (ref 0–45)
ATRIAL RATE - MUSE: 68 BPM
ATRIAL RATE - MUSE: NORMAL BPM
BASO+EOS+MONOS # BLD AUTO: ABNORMAL 10*3/UL
BASO+EOS+MONOS NFR BLD AUTO: ABNORMAL %
BASOPHILS # BLD AUTO: 0 10E3/UL (ref 0–0.2)
BASOPHILS NFR BLD AUTO: 0 %
BILIRUB SERPL-MCNC: 1.3 MG/DL
BUN SERPL-MCNC: 44.5 MG/DL (ref 8–23)
CALCIUM SERPL-MCNC: 9.4 MG/DL (ref 8.6–10)
CHLORIDE SERPL-SCNC: 90 MMOL/L (ref 98–107)
CPB POCT: NO
CREAT SERPL-MCNC: 5.77 MG/DL (ref 0.67–1.17)
DEPRECATED HCO3 PLAS-SCNC: 24 MMOL/L (ref 22–29)
DIASTOLIC BLOOD PRESSURE - MUSE: NORMAL MMHG
DIASTOLIC BLOOD PRESSURE - MUSE: NORMAL MMHG
EGFRCR SERPLBLD CKD-EPI 2021: 11 ML/MIN/1.73M2
EOSINOPHIL # BLD AUTO: 0.3 10E3/UL (ref 0–0.7)
EOSINOPHIL NFR BLD AUTO: 3 %
ERYTHROCYTE [DISTWIDTH] IN BLOOD BY AUTOMATED COUNT: 14.3 % (ref 10–15)
FLUAV RNA SPEC QL NAA+PROBE: NEGATIVE
FLUBV RNA RESP QL NAA+PROBE: NEGATIVE
GLUCOSE BLDC GLUCOMTR-MCNC: 132 MG/DL (ref 70–99)
GLUCOSE BLDC GLUCOMTR-MCNC: 178 MG/DL (ref 70–99)
GLUCOSE SERPL-MCNC: 127 MG/DL (ref 70–99)
HCO3 BLDV-SCNC: 28 MMOL/L (ref 21–28)
HCO3 BLDV-SCNC: 29 MMOL/L (ref 21–28)
HCO3 BLDV-SCNC: 29 MMOL/L (ref 21–28)
HCT VFR BLD AUTO: 35.2 % (ref 40–53)
HCT VFR BLD CALC: 37 % (ref 40–53)
HGB BLD-MCNC: 11.4 G/DL (ref 13.3–17.7)
HGB BLD-MCNC: 12.6 G/DL (ref 13.3–17.7)
HOLD SPECIMEN: NORMAL
HOLD SPECIMEN: NORMAL
IMM GRANULOCYTES # BLD: 0 10E3/UL
IMM GRANULOCYTES NFR BLD: 0 %
INR PPP: 1.55 (ref 0.85–1.15)
INTERPRETATION ECG - MUSE: NORMAL
INTERPRETATION ECG - MUSE: NORMAL
LACTATE BLD-SCNC: 0.7 MMOL/L
LACTATE BLD-SCNC: 2.4 MMOL/L
LACTATE SERPL-SCNC: 0.6 MMOL/L (ref 0.7–2)
LVEF ECHO: NORMAL
LYMPHOCYTES # BLD AUTO: 4.4 10E3/UL (ref 0.8–5.3)
LYMPHOCYTES NFR BLD AUTO: 45 %
MAGNESIUM SERPL-MCNC: 2.1 MG/DL (ref 1.7–2.3)
MCH RBC QN AUTO: 31 PG (ref 26.5–33)
MCHC RBC AUTO-ENTMCNC: 32.4 G/DL (ref 31.5–36.5)
MCV RBC AUTO: 96 FL (ref 78–100)
MONOCYTES # BLD AUTO: 1.1 10E3/UL (ref 0–1.3)
MONOCYTES NFR BLD AUTO: 11 %
NEUTROPHILS # BLD AUTO: 4 10E3/UL (ref 1.6–8.3)
NEUTROPHILS NFR BLD AUTO: 41 %
NRBC # BLD AUTO: 0 10E3/UL
NRBC BLD AUTO-RTO: 0 /100
P AXIS - MUSE: 57 DEGREES
P AXIS - MUSE: NORMAL DEGREES
PATH REPORT.COMMENTS IMP SPEC: NORMAL
PATH REPORT.FINAL DX SPEC: NORMAL
PATH REPORT.GROSS SPEC: NORMAL
PATH REPORT.MICROSCOPIC SPEC OTHER STN: NORMAL
PATH REPORT.RELEVANT HX SPEC: NORMAL
PCO2 BLDV: 45 MM HG (ref 40–50)
PCO2 BLDV: 45 MM HG (ref 40–50)
PCO2 BLDV: 49 MM HG (ref 40–50)
PH BLDV: 7.37 [PH] (ref 7.32–7.43)
PH BLDV: 7.41 [PH] (ref 7.32–7.43)
PH BLDV: 7.42 [PH] (ref 7.32–7.43)
PHOSPHATE SERPL-MCNC: 5.2 MG/DL (ref 2.5–4.5)
PLATELET # BLD AUTO: 146 10E3/UL (ref 150–450)
PO2 BLDV: 16 MM HG (ref 25–47)
PO2 BLDV: 16 MM HG (ref 25–47)
PO2 BLDV: 29 MM HG (ref 25–47)
POTASSIUM BLD-SCNC: 3.6 MMOL/L (ref 3.4–5.3)
POTASSIUM SERPL-SCNC: 3.8 MMOL/L (ref 3.4–5.3)
PR INTERVAL - MUSE: 188 MS
PR INTERVAL - MUSE: NORMAL MS
PROT SERPL-MCNC: 7.6 G/DL (ref 6.4–8.3)
QRS DURATION - MUSE: 72 MS
QRS DURATION - MUSE: 80 MS
QT - MUSE: 326 MS
QT - MUSE: 440 MS
QTC - MUSE: 467 MS
QTC - MUSE: 522 MS
R AXIS - MUSE: 39 DEGREES
R AXIS - MUSE: 39 DEGREES
RADIOLOGIST FLAGS: ABNORMAL
RBC # BLD AUTO: 3.68 10E6/UL (ref 4.4–5.9)
RSV RNA SPEC NAA+PROBE: NEGATIVE
SAO2 % BLDV: 21 % (ref 94–100)
SAO2 % BLDV: 22 % (ref 94–100)
SAO2 % BLDV: 51 % (ref 94–100)
SARS-COV-2 RNA RESP QL NAA+PROBE: NEGATIVE
SODIUM BLD-SCNC: 135 MMOL/L (ref 133–144)
SODIUM SERPL-SCNC: 136 MMOL/L (ref 135–145)
SPECIMEN EXPIRATION DATE: NORMAL
SYSTOLIC BLOOD PRESSURE - MUSE: NORMAL MMHG
SYSTOLIC BLOOD PRESSURE - MUSE: NORMAL MMHG
T AXIS - MUSE: 28 DEGREES
T AXIS - MUSE: 54 DEGREES
TROPONIN T SERPL HS-MCNC: 346 NG/L
UFH PPP CHRO-ACNC: >1.1 IU/ML
VENTRICULAR RATE- MUSE: 154 BPM
VENTRICULAR RATE- MUSE: 68 BPM
WBC # BLD AUTO: 9.9 10E3/UL (ref 4–11)

## 2023-10-05 PROCEDURE — 99292 CRITICAL CARE ADDL 30 MIN: CPT | Performed by: INTERNAL MEDICINE

## 2023-10-05 PROCEDURE — 86850 RBC ANTIBODY SCREEN: CPT | Performed by: EMERGENCY MEDICINE

## 2023-10-05 PROCEDURE — 93971 EXTREMITY STUDY: CPT | Mod: RT

## 2023-10-05 PROCEDURE — 93005 ELECTROCARDIOGRAM TRACING: CPT | Mod: 76,59

## 2023-10-05 PROCEDURE — 99291 CRITICAL CARE FIRST HOUR: CPT | Performed by: INTERNAL MEDICINE

## 2023-10-05 PROCEDURE — 74177 CT ABD & PELVIS W/CONTRAST: CPT

## 2023-10-05 PROCEDURE — 96367 TX/PROPH/DG ADDL SEQ IV INF: CPT | Mod: 59

## 2023-10-05 PROCEDURE — 84100 ASSAY OF PHOSPHORUS: CPT | Performed by: INTERNAL MEDICINE

## 2023-10-05 PROCEDURE — 83735 ASSAY OF MAGNESIUM: CPT | Performed by: EMERGENCY MEDICINE

## 2023-10-05 PROCEDURE — 96365 THER/PROPH/DIAG IV INF INIT: CPT | Mod: 59

## 2023-10-05 PROCEDURE — 85730 THROMBOPLASTIN TIME PARTIAL: CPT | Performed by: EMERGENCY MEDICINE

## 2023-10-05 PROCEDURE — 85730 THROMBOPLASTIN TIME PARTIAL: CPT | Performed by: INTERNAL MEDICINE

## 2023-10-05 PROCEDURE — 250N000011 HC RX IP 250 OP 636: Performed by: EMERGENCY MEDICINE

## 2023-10-05 PROCEDURE — 93308 TTE F-UP OR LMTD: CPT

## 2023-10-05 PROCEDURE — 84295 ASSAY OF SERUM SODIUM: CPT

## 2023-10-05 PROCEDURE — 250N000011 HC RX IP 250 OP 636: Mod: JZ | Performed by: INTERNAL MEDICINE

## 2023-10-05 PROCEDURE — 250N000013 HC RX MED GY IP 250 OP 250 PS 637: Performed by: INTERNAL MEDICINE

## 2023-10-05 PROCEDURE — 3E043XZ INTRODUCTION OF VASOPRESSOR INTO CENTRAL VEIN, PERCUTANEOUS APPROACH: ICD-10-PCS | Performed by: EMERGENCY MEDICINE

## 2023-10-05 PROCEDURE — 88342 IMHCHEM/IMCYTCHM 1ST ANTB: CPT | Mod: 26 | Performed by: PATHOLOGY

## 2023-10-05 PROCEDURE — 93325 DOPPLER ECHO COLOR FLOW MAPG: CPT | Mod: 26 | Performed by: INTERNAL MEDICINE

## 2023-10-05 PROCEDURE — 85520 HEPARIN ASSAY: CPT | Performed by: EMERGENCY MEDICINE

## 2023-10-05 PROCEDURE — 250N000012 HC RX MED GY IP 250 OP 636 PS 637: Performed by: INTERNAL MEDICINE

## 2023-10-05 PROCEDURE — 82803 BLOOD GASES ANY COMBINATION: CPT

## 2023-10-05 PROCEDURE — 200N000001 HC R&B ICU

## 2023-10-05 PROCEDURE — 85025 COMPLETE CBC W/AUTO DIFF WBC: CPT | Performed by: EMERGENCY MEDICINE

## 2023-10-05 PROCEDURE — 86901 BLOOD TYPING SEROLOGIC RH(D): CPT | Performed by: EMERGENCY MEDICINE

## 2023-10-05 PROCEDURE — 99292 CRITICAL CARE ADDL 30 MIN: CPT

## 2023-10-05 PROCEDURE — 93970 EXTREMITY STUDY: CPT

## 2023-10-05 PROCEDURE — 84484 ASSAY OF TROPONIN QUANT: CPT | Performed by: EMERGENCY MEDICINE

## 2023-10-05 PROCEDURE — 96376 TX/PRO/DX INJ SAME DRUG ADON: CPT | Mod: 59

## 2023-10-05 PROCEDURE — 36556 INSERT NON-TUNNEL CV CATH: CPT

## 2023-10-05 PROCEDURE — 85610 PROTHROMBIN TIME: CPT | Performed by: EMERGENCY MEDICINE

## 2023-10-05 PROCEDURE — 93321 DOPPLER ECHO F-UP/LMTD STD: CPT

## 2023-10-05 PROCEDURE — 250N000011 HC RX IP 250 OP 636: Mod: JZ | Performed by: STUDENT IN AN ORGANIZED HEALTH CARE EDUCATION/TRAINING PROGRAM

## 2023-10-05 PROCEDURE — 258N000003 HC RX IP 258 OP 636: Performed by: EMERGENCY MEDICINE

## 2023-10-05 PROCEDURE — 255N000002 HC RX 255 OP 636: Performed by: EMERGENCY MEDICINE

## 2023-10-05 PROCEDURE — 250N000009 HC RX 250: Performed by: EMERGENCY MEDICINE

## 2023-10-05 PROCEDURE — 88305 TISSUE EXAM BY PATHOLOGIST: CPT | Mod: 26 | Performed by: PATHOLOGY

## 2023-10-05 PROCEDURE — 92960 CARDIOVERSION ELECTRIC EXT: CPT

## 2023-10-05 PROCEDURE — 99223 1ST HOSP IP/OBS HIGH 75: CPT | Performed by: INTERNAL MEDICINE

## 2023-10-05 PROCEDURE — 87040 BLOOD CULTURE FOR BACTERIA: CPT | Performed by: EMERGENCY MEDICINE

## 2023-10-05 PROCEDURE — 250N000011 HC RX IP 250 OP 636: Mod: JZ | Performed by: EMERGENCY MEDICINE

## 2023-10-05 PROCEDURE — 83605 ASSAY OF LACTIC ACID: CPT

## 2023-10-05 PROCEDURE — 93325 DOPPLER ECHO COLOR FLOW MAPG: CPT

## 2023-10-05 PROCEDURE — 96366 THER/PROPH/DIAG IV INF ADDON: CPT | Mod: 59

## 2023-10-05 PROCEDURE — 250N000011 HC RX IP 250 OP 636: Mod: JZ

## 2023-10-05 PROCEDURE — 5A2204Z RESTORATION OF CARDIAC RHYTHM, SINGLE: ICD-10-PCS | Performed by: EMERGENCY MEDICINE

## 2023-10-05 PROCEDURE — 88341 IMHCHEM/IMCYTCHM EA ADD ANTB: CPT | Mod: 26 | Performed by: PATHOLOGY

## 2023-10-05 PROCEDURE — 93971 EXTREMITY STUDY: CPT | Mod: LT,XS

## 2023-10-05 PROCEDURE — 93308 TTE F-UP OR LMTD: CPT | Mod: 26 | Performed by: INTERNAL MEDICINE

## 2023-10-05 PROCEDURE — 99232 SBSQ HOSP IP/OBS MODERATE 35: CPT | Mod: 24 | Performed by: SURGERY

## 2023-10-05 PROCEDURE — 87637 SARSCOV2&INF A&B&RSV AMP PRB: CPT | Performed by: EMERGENCY MEDICINE

## 2023-10-05 PROCEDURE — 36620 INSERTION CATHETER ARTERY: CPT

## 2023-10-05 PROCEDURE — 80053 COMPREHEN METABOLIC PANEL: CPT | Performed by: EMERGENCY MEDICINE

## 2023-10-05 PROCEDURE — 88112 CYTOPATH CELL ENHANCE TECH: CPT | Mod: 26 | Performed by: PATHOLOGY

## 2023-10-05 PROCEDURE — 93321 DOPPLER ECHO F-UP/LMTD STD: CPT | Mod: 26 | Performed by: INTERNAL MEDICINE

## 2023-10-05 PROCEDURE — 82140 ASSAY OF AMMONIA: CPT | Performed by: EMERGENCY MEDICINE

## 2023-10-05 PROCEDURE — 36415 COLL VENOUS BLD VENIPUNCTURE: CPT | Performed by: EMERGENCY MEDICINE

## 2023-10-05 PROCEDURE — 99291 CRITICAL CARE FIRST HOUR: CPT | Mod: 25

## 2023-10-05 PROCEDURE — 250N000013 HC RX MED GY IP 250 OP 250 PS 637: Performed by: EMERGENCY MEDICINE

## 2023-10-05 PROCEDURE — 96375 TX/PRO/DX INJ NEW DRUG ADDON: CPT | Mod: 59

## 2023-10-05 PROCEDURE — 93005 ELECTROCARDIOGRAM TRACING: CPT | Mod: 59

## 2023-10-05 RX ORDER — ASPIRIN 81 MG/1
324 TABLET, CHEWABLE ORAL ONCE
Status: COMPLETED | OUTPATIENT
Start: 2023-10-05 | End: 2023-10-05

## 2023-10-05 RX ORDER — ROPINIROLE 0.5 MG/1
0.5 TABLET, FILM COATED ORAL 2 TIMES DAILY PRN
Status: DISCONTINUED | OUTPATIENT
Start: 2023-10-05 | End: 2023-10-23 | Stop reason: HOSPADM

## 2023-10-05 RX ORDER — LACOSAMIDE 50 MG/1
100 TABLET ORAL EVERY EVENING
Status: DISCONTINUED | OUTPATIENT
Start: 2023-10-05 | End: 2023-10-23 | Stop reason: HOSPADM

## 2023-10-05 RX ORDER — PIPERACILLIN SODIUM, TAZOBACTAM SODIUM 2; .25 G/10ML; G/10ML
2.25 INJECTION, POWDER, LYOPHILIZED, FOR SOLUTION INTRAVENOUS ONCE
Status: COMPLETED | OUTPATIENT
Start: 2023-10-05 | End: 2023-10-05

## 2023-10-05 RX ORDER — HYDROXYZINE HYDROCHLORIDE 25 MG/1
25 TABLET, FILM COATED ORAL 3 TIMES DAILY PRN
Status: DISCONTINUED | OUTPATIENT
Start: 2023-10-05 | End: 2023-10-23 | Stop reason: HOSPADM

## 2023-10-05 RX ORDER — ACETAMINOPHEN 325 MG/1
650 TABLET ORAL EVERY 4 HOURS PRN
Status: DISCONTINUED | OUTPATIENT
Start: 2023-10-05 | End: 2023-10-23 | Stop reason: HOSPADM

## 2023-10-05 RX ORDER — TACROLIMUS 1 MG/1
1 CAPSULE ORAL EVERY 12 HOURS
Status: DISCONTINUED | OUTPATIENT
Start: 2023-10-05 | End: 2023-10-14

## 2023-10-05 RX ORDER — CALCIUM CHLORIDE 100 MG/ML
1 INJECTION INTRAVENOUS; INTRAVENTRICULAR ONCE
Status: COMPLETED | OUTPATIENT
Start: 2023-10-05 | End: 2023-10-05

## 2023-10-05 RX ORDER — HEPARIN SODIUM 10000 [USP'U]/100ML
0-5000 INJECTION, SOLUTION INTRAVENOUS CONTINUOUS
Status: DISCONTINUED | OUTPATIENT
Start: 2023-10-05 | End: 2023-10-05

## 2023-10-05 RX ORDER — HEPARIN SODIUM 10000 [USP'U]/100ML
0-5000 INJECTION, SOLUTION INTRAVENOUS CONTINUOUS
Status: DISPENSED | OUTPATIENT
Start: 2023-10-05 | End: 2023-10-13

## 2023-10-05 RX ORDER — SEVELAMER CARBONATE 800 MG/1
800 TABLET, FILM COATED ORAL 4 TIMES DAILY
Status: DISCONTINUED | OUTPATIENT
Start: 2023-10-05 | End: 2023-10-23 | Stop reason: HOSPADM

## 2023-10-05 RX ORDER — HYDROMORPHONE HCL IN WATER/PF 6 MG/30 ML
0.2 PATIENT CONTROLLED ANALGESIA SYRINGE INTRAVENOUS ONCE
Status: COMPLETED | OUTPATIENT
Start: 2023-10-06 | End: 2023-10-05

## 2023-10-05 RX ORDER — B COMPLEX, C NO.20/FOLIC ACID 1 MG
1 CAPSULE ORAL DAILY
Status: DISCONTINUED | OUTPATIENT
Start: 2023-10-05 | End: 2023-10-23 | Stop reason: HOSPADM

## 2023-10-05 RX ORDER — ONDANSETRON 2 MG/ML
4 INJECTION INTRAMUSCULAR; INTRAVENOUS EVERY 30 MIN PRN
Status: DISCONTINUED | OUTPATIENT
Start: 2023-10-05 | End: 2023-10-05

## 2023-10-05 RX ORDER — LACOSAMIDE 50 MG/1
50 TABLET ORAL EVERY MORNING
Status: DISCONTINUED | OUTPATIENT
Start: 2023-10-06 | End: 2023-10-23 | Stop reason: HOSPADM

## 2023-10-05 RX ORDER — PIPERACILLIN SODIUM, TAZOBACTAM SODIUM 2; .25 G/10ML; G/10ML
2.25 INJECTION, POWDER, LYOPHILIZED, FOR SOLUTION INTRAVENOUS EVERY 6 HOURS
Status: DISCONTINUED | OUTPATIENT
Start: 2023-10-05 | End: 2023-10-12

## 2023-10-05 RX ORDER — MIDODRINE HYDROCHLORIDE 5 MG/1
10 TABLET ORAL 2 TIMES DAILY PRN
Status: DISCONTINUED | OUTPATIENT
Start: 2023-10-05 | End: 2023-10-20

## 2023-10-05 RX ORDER — LACOSAMIDE 100 MG/1
100 TABLET ORAL DAILY
Status: CANCELLED | OUTPATIENT
Start: 2023-10-05

## 2023-10-05 RX ORDER — NICOTINE POLACRILEX 4 MG
15-30 LOZENGE BUCCAL
Status: DISCONTINUED | OUTPATIENT
Start: 2023-10-05 | End: 2023-10-23 | Stop reason: HOSPADM

## 2023-10-05 RX ORDER — ACETAMINOPHEN 650 MG/1
650 SUPPOSITORY RECTAL EVERY 4 HOURS PRN
Status: DISCONTINUED | OUTPATIENT
Start: 2023-10-05 | End: 2023-10-23 | Stop reason: HOSPADM

## 2023-10-05 RX ORDER — TACROLIMUS 1 MG/1
1 CAPSULE ORAL EVERY 12 HOURS SCHEDULED
Status: CANCELLED | OUTPATIENT
Start: 2023-10-05

## 2023-10-05 RX ORDER — ONDANSETRON 2 MG/ML
INJECTION INTRAMUSCULAR; INTRAVENOUS
Status: COMPLETED
Start: 2023-10-05 | End: 2023-10-05

## 2023-10-05 RX ORDER — NOREPINEPHRINE BITARTRATE 0.02 MG/ML
.01-.6 INJECTION, SOLUTION INTRAVENOUS CONTINUOUS
Status: DISCONTINUED | OUTPATIENT
Start: 2023-10-05 | End: 2023-10-08

## 2023-10-05 RX ORDER — SEVELAMER CARBONATE 800 MG/1
800 TABLET, FILM COATED ORAL
Status: CANCELLED | OUTPATIENT
Start: 2023-10-05

## 2023-10-05 RX ORDER — DEXTROSE MONOHYDRATE 25 G/50ML
25-50 INJECTION, SOLUTION INTRAVENOUS
Status: DISCONTINUED | OUTPATIENT
Start: 2023-10-05 | End: 2023-10-23 | Stop reason: HOSPADM

## 2023-10-05 RX ORDER — PANTOPRAZOLE SODIUM 40 MG/1
40 TABLET, DELAYED RELEASE ORAL
Status: DISCONTINUED | OUTPATIENT
Start: 2023-10-06 | End: 2023-10-23 | Stop reason: HOSPADM

## 2023-10-05 RX ORDER — GABAPENTIN 100 MG/1
100 CAPSULE ORAL DAILY
Status: DISCONTINUED | OUTPATIENT
Start: 2023-10-05 | End: 2023-10-06

## 2023-10-05 RX ORDER — IOPAMIDOL 755 MG/ML
90 INJECTION, SOLUTION INTRAVASCULAR ONCE
Status: COMPLETED | OUTPATIENT
Start: 2023-10-05 | End: 2023-10-05

## 2023-10-05 RX ADMIN — VANCOMYCIN HYDROCHLORIDE 1750 MG: 10 INJECTION, POWDER, LYOPHILIZED, FOR SOLUTION INTRAVENOUS at 04:17

## 2023-10-05 RX ADMIN — HYDROMORPHONE HYDROCHLORIDE 0.2 MG: 0.2 INJECTION, SOLUTION INTRAMUSCULAR; INTRAVENOUS; SUBCUTANEOUS at 23:47

## 2023-10-05 RX ADMIN — ROPINIROLE HYDROCHLORIDE 0.5 MG: 0.5 TABLET, FILM COATED ORAL at 20:42

## 2023-10-05 RX ADMIN — SODIUM CHLORIDE 95 ML: 9 INJECTION, SOLUTION INTRAVENOUS at 06:10

## 2023-10-05 RX ADMIN — ASPIRIN 81 MG 324 MG: 81 TABLET ORAL at 06:12

## 2023-10-05 RX ADMIN — ONDANSETRON 4 MG: 2 INJECTION INTRAMUSCULAR; INTRAVENOUS at 02:50

## 2023-10-05 RX ADMIN — NOREPINEPHRINE BITARTRATE 0.03 MCG/KG/MIN: 0.02 INJECTION, SOLUTION INTRAVENOUS at 04:07

## 2023-10-05 RX ADMIN — PIPERACILLIN AND TAZOBACTAM 2.25 G: 2; .25 INJECTION, POWDER, FOR SOLUTION INTRAVENOUS at 20:42

## 2023-10-05 RX ADMIN — Medication 1 CAPSULE: at 19:00

## 2023-10-05 RX ADMIN — ACETAMINOPHEN 650 MG: 325 TABLET, FILM COATED ORAL at 14:40

## 2023-10-05 RX ADMIN — HYDROXYZINE HYDROCHLORIDE 25 MG: 25 TABLET, FILM COATED ORAL at 20:42

## 2023-10-05 RX ADMIN — ACETAMINOPHEN 650 MG: 325 TABLET, FILM COATED ORAL at 19:00

## 2023-10-05 RX ADMIN — HEPARIN SODIUM 1050 UNITS/HR: 10000 INJECTION, SOLUTION INTRAVENOUS at 06:52

## 2023-10-05 RX ADMIN — SEVELAMER CARBONATE 800 MG: 800 TABLET, FILM COATED ORAL at 19:00

## 2023-10-05 RX ADMIN — NOREPINEPHRINE BITARTRATE 0.03 MCG/KG/MIN: 0.02 INJECTION, SOLUTION INTRAVENOUS at 06:45

## 2023-10-05 RX ADMIN — PIPERACILLIN AND TAZOBACTAM 2.25 G: 2; .25 INJECTION, POWDER, FOR SOLUTION INTRAVENOUS at 04:10

## 2023-10-05 RX ADMIN — CALCIUM CHLORIDE 1 G: 100 INJECTION INTRAVENOUS; INTRAVENTRICULAR at 02:52

## 2023-10-05 RX ADMIN — SODIUM CHLORIDE 1 MG/MIN: 9 INJECTION, SOLUTION INTRAVENOUS at 03:26

## 2023-10-05 RX ADMIN — ONDANSETRON 4 MG: 2 INJECTION INTRAMUSCULAR; INTRAVENOUS at 04:25

## 2023-10-05 RX ADMIN — AMIODARONE HYDROCHLORIDE 150 MG: 1.5 INJECTION, SOLUTION INTRAVENOUS at 03:09

## 2023-10-05 RX ADMIN — IOPAMIDOL 90 ML: 755 INJECTION, SOLUTION INTRAVENOUS at 06:10

## 2023-10-05 RX ADMIN — MIDAZOLAM 2 MG: 1 INJECTION INTRAMUSCULAR; INTRAVENOUS at 02:53

## 2023-10-05 RX ADMIN — GABAPENTIN 100 MG: 100 CAPSULE ORAL at 17:27

## 2023-10-05 RX ADMIN — PIPERACILLIN AND TAZOBACTAM 2.25 G: 2; .25 INJECTION, POWDER, FOR SOLUTION INTRAVENOUS at 13:47

## 2023-10-05 RX ADMIN — TACROLIMUS 1 MG: 1 CAPSULE ORAL at 20:42

## 2023-10-05 RX ADMIN — HUMAN ALBUMIN MICROSPHERES AND PERFLUTREN 9 ML: 10; .22 INJECTION, SOLUTION INTRAVENOUS at 04:33

## 2023-10-05 RX ADMIN — SODIUM CHLORIDE, POTASSIUM CHLORIDE, SODIUM LACTATE AND CALCIUM CHLORIDE 500 ML: 600; 310; 30; 20 INJECTION, SOLUTION INTRAVENOUS at 02:53

## 2023-10-05 RX ADMIN — LACOSAMIDE 100 MG: 50 TABLET, FILM COATED ORAL at 20:42

## 2023-10-05 ASSESSMENT — ACTIVITIES OF DAILY LIVING (ADL)
ADLS_ACUITY_SCORE: 35
ADLS_ACUITY_SCORE: 41
ADLS_ACUITY_SCORE: 35
ADLS_ACUITY_SCORE: 41
ADLS_ACUITY_SCORE: 43

## 2023-10-05 NOTE — Clinical Note
Potential access sites were evaluated for patency using ultrasound.   The left chest was selected. Access was obtained under with Sonosite and Fluoroscopic guidance using a micropuncture 21 gauge needle with direct visualization of needle entry.

## 2023-10-05 NOTE — Clinical Note
Potential access sites were evaluated for patency using ultrasound.   The left internal jugular vein was selected. Access was obtained under with Sonosite and Fluoroscopic guidance using a micropuncture 21 gauge needle with direct visualization of needle entry.

## 2023-10-05 NOTE — H&P
Critical Care  Note      10/05/2023    Name: Blanco Osborne MRN#: [id number]   Age: 58 year old YOB: 1965     Hsptl Day# 0  ICU DAY #    MV DAY #             Problem List:   Principal Problem:    Pulmonary infiltrates  Active Problems:    ESRD (end stage renal disease) on dialysis (H)    Pericardial effusion    Wide-complex tachycardia    Elevated troponin    Atrial fibrillation with rapid ventricular response (H)    Severe sepsis (H)    Multiple subsegmental pulmonary emboli without acute cor pulmonale (H)           Summary/Hospital Course:     Blanco Osborne is a 58 year old male with liver transplant 2016 due to alcohol abuse, pAF on chronic anticoagulation, history of diabetes mellitus type 2, CVA, hypertension, CHRISTIAN on BiPAP he awoke with chest discomfort and palpitations, wife relays that he was diaphoretic and pale. He has not had similar symptoms in the past. He was recently admitted to [facility] (9/28) for a pericardial effusion and had this drained. During that time he was off of his apixaban for approximately 48 hours.   Upon arrival to ED was hypotensive and noted to have a wide complex tachycardia. Despite fluid resuscitation, remained hypotensive and required emergent cardioversion which was unsuccessful. Subsequently amiodarone was initiated and after some time the patient did convert to normal sinus rhythm with improvement. He had elevated troponin however it is unclear if this is secondary to underlying acute coronary syndrome, demand ischemia from his tachycardia, or his end-stage renal disease. He has a known pericardial effusion, bedside echo demonstrates fluid therefore a formal echo was obtained with the read above. There is no signs of tamponade on the echo report. CT imaging of the chest abdomen pelvis demonstrated PE and pulmonary infiltrates and the patient was anticoagulated with heparin, and received broad-spectrum antibiotic coverage for concern for sepsis.  Patient responded nicely to judicious IV fluid resuscitation as well as administration of vasopressors.     He is requiring ICU care for hemodynamic instability.      ROS - Having chest pain. Last bowel movement yesterday. Otherwise unremarkable.       Assessment and plan :     Blanco Osborne IS a 58 year old male admitted on 10/5/2023 for hemodynamic instability.     I have personally reviewed the daily labs, imaging studies, cultures and discussed the case with referring physician and consulting physicians.     My assessment and plan by system for this patient is as follows:    Neurology/Psychiatry:   1. RASS 0/-1  2. Pain/analgesia - Noted that he should avoid opioids due to history of encephaloathy and confusion, even if patient requests.  Manage with Acetaminophen, PTA gabapentin 100mg  3. Sedation - not required at this time  4. Delirium - history of hospital delirium, hepatic encephalopathy  -Protocols: consider Delirium precautions    #History of Seizure  Per chart review was previously on Keppra and Lacosamide during hospitalization for seizures thought to be secondary to hypoxic events.  -Meds: PTA Lacosamide, per pharmacy levels not needed    #Itching #Anxiety  -PTA PRN Hydroxyzine    Cardiovascular:   1.Hemodynamics -  #Shock, Cardiogenic vs. Septic  #SVT with aberrancy #Paroxysmal Afib  #Pericardial Effusion, Chronic  #Pulmonary Embolism  #Pulmonary Infiltrates  Requiring pressor support with Levo at 0.02 mcg/kg/min to meet MAP goals > 65mmHg despite fluid resuscitation. Had worsening chronic pericardial effusion with planned pericardiocentesis one week ago (9/28) and had lifelong apixaban held for procedure. Per Echo, found to have recurrent effusion with no evidence of tamponade. Per EKG, patient was in SVT with aberrancy secondary to Afib in the ED. Achieved sinus rhythm with amiodarone after two failed attempts with electric cardioversion. CT showed a small PE with scattered pulmonary infiltrates,  subsequently started on high intensity Heparin though per phone call with radiology, PE is likely smaller than stated due to motion artifact and unlikely to cause burden large enough for significant RV strain. Likely that pericardial effusion paroxysmal Afib and loss of atrial kick caused acute presentation of shock due to low reserve from a worsening pericardial effusion, ESRD with recent incomplete dialysis, pulmonary hypertension/lung fibrosis from ~6mo hospitalization this year. Given the possibility of incidental PE findings but concurrent uncertainty of VTE acuity, will continue heparin at a dose TBD. Low concern for sepsis given WBC, lactic acid WNL with pericardial effusion sample on 9/28 showing no growth to date, though due to history of prolonged hospital stay and immunosuppression will continue broad-spectrum abx while pending sputum/blood cx. In regards to arrhythmias - remains in sinus rhythm, will continue IV amiodarone given low cardiac reserve, as he requires midodrine to maintain CO while receiving dialysis. Will consider metoprolol if requiring rate control.   -Pressors: 0.02 Levo mcg/kg/min  -Infusions: Heparin (dose to be determined), IV Amiodarone  -Consults: Cardiology  -Abx: Vancomycin, Zosyn (end date TBD)  -Labs: Coags, CBC, CMP  -Meds: PRN Midodrine for dialysis  -Cultures: pericardial effusion 9/28 NGTD, pending blood, sputum      Pulmonary/Ventilator Management:   1. Oxygenation/ventilation/mechanics  #Sating 90s on room air  No evidence of acidosis/alkalosis.     #Pulmonary Embolism  Per radiology, PE is likely smaller than stated due to motion artifact and unlikely to cause burden large enough for significant RV strain. Given possibility of an incidental PE in the setting of worsening and recurrent pericardial effusion, will continue with thoughtful and conservative management of anticoagulant to not further exacerbate effusion to require centesis, while addressing acute issues  contributing to presentation. Will follow up to determine acuity of clotting with dopplers in all extremities to determine if Heparin will be therapeutic for PE, DVT or prophylactic. Will likely consult Heme Onc pending doppler results.   -Infusions: Anticoagulation for heparin TBD in the setting of worsening pericardial effusion  -Imaging: Doppler of all extremities  -Consults: likely AM Heme Consult    #Pulmonary Infiltrates, concerning for PNA  Will treat empirically in the setting of chronic immunosuppression, ESRD, and prolonged hospital course in 4/2023 (treated for ARDS, b/l pneumothoraces, hydroPTX, fungal PNA). As stated above.   -Abx: Vancomycin, Zosyn (end date TBD)  -Imaging: Chest XR    #Sleep Apnea  #Pulmonary Hypertension  Continue BIPAP inpatient Provides therapeutic decrease in preload and afterload in setting of low cardiac reserve  -PTA Bipap    GI and Nutrition :   #S/p liver transplant 2016   #Chronic immunosuppression, on tacrolimus  Alk Phos elevated to 252, baseline in 200's. Appears well compensated based on labs, per chart review thought to have been able to regenerate after cirrhosis last year. On chronic immunosuppression with Tacrolimus 1mg BID. Historically has been continued in the setting of sepsis/infection.   -Meds: Tacrolimus 1mg BID, to follow up with levels s/p 2-3 doses.    #Renal Diet    Renal/Fluids/Electrolytes:   #ESRD on dialysis (MWF)  Last dialysis on 10/4 Wednesday, will continue in hospital. Nephrology consulted, will appreciate recommendations. BUN/Cr consistent with ESRD. Replete lytes as necessary. Likely contributory to troponemia. Nephrology consulted.  -Labs: Trend CMP, Phos  -Meds: PTA Sevelamer QID, can skip last dose, Midodrine for hypotension during dialysis  -Consults: Nephrology - Continue dialysis MWF  -Protocols: replete lytes as necessary    Infectious Disease:   #Pulmonary Infiltrates  #Chronic Immunosuppression  As stated above.    Endocrine:   #Stress  induced hyperglycemia  A1c 8/23 4.7. Did not require IV insulin during last hospitalization (4/23). Will discontinue if needed.   -Hyperglycemia Protocol    Hematology/Oncology:    #Anemia, secondary to ESRD, chronic disease. No signs, symptoms of active blood loss  Baseline hgb 7-8, per chart review possible Epo resistance.   -Labs: CBC    #Hypercoaguability  #History of embolic strokes  As stated above.     IV/Access:   1. Venous access - Central Line, PIV  2. Arterial access - A line, right femoral        ICU Prophylaxis:   1. DVT: Hep infusion at this time, to be determined with heme consult  2. VAP: HOB 30 degrees, chlorhexidine rinse  3. Stress Ulcer: PPI  4. Restraints: Nonviolent soft two point restraints required and necessary for patient safety and continued cares and good effect as patient continues to pull at necessary lines, tubes despite education and distraction. Will readdress daily.   5. Wound care  - n/a  6. Feeding - Regular Diet  7. Family Update: Wife  8. Disposition - ICU for Pressor support    Clinically Significant Risk Factors Present on Admission             # Anion Gap Metabolic Acidosis: Highest Anion Gap = 22 mmol/L in last 2 days, will monitor and treat as appropriate   # Drug Induced Coagulation Defect: home medication list includes an anticoagulant medication  # Thrombocytopenia: Lowest platelets = 93 in last 2 days, will monitor for bleeding   # Hypertension: Noted on problem list   # Circulatory Shock: currently requiring pressors for blood pressure support     # Overweight: Estimated body mass index is 26.61 kg/m  as calculated from the following:    Height as of this encounter: 1.829 m (6').    Weight as of this encounter: 89 kg (196 lb 3.4 oz).                                 Key Medications:      calcium chloride  1 g Intravenous Once      amiodarone 0.5 mg/min (10/05/23 0911)    heparin 1,050 Units/hr (10/05/23 0832)    norepinephrine 0.03 mcg/kg/min (10/05/23 0833)               Physical Examination:   Temp:  [97.8  F (36.6  C)-98.2  F (36.8  C)] 97.8  F (36.6  C)  Pulse:  [] 51  Resp:  [5-105] 21  BP: ()/(36-84) 101/66  MAP:  [54 mmHg-91 mmHg] 76 mmHg  Arterial Line BP: (-2-117)/(-3-72) 110/57  SpO2:  [89 %-100 %] 95 %    Intake/Output Summary (Last 24 hours) at 10/5/2023 1008  Last data filed at 10/5/2023 1000  Gross per 24 hour   Intake 342.88 ml   Output --   Net 342.88 ml     Wt Readings from Last 4 Encounters:   10/05/23 89 kg (196 lb 3.4 oz)   10/03/23 86.2 kg (190 lb)   10/01/23 87.9 kg (193 lb 11.2 oz)   09/21/23 88.5 kg (195 lb)     Arterial Line BP: (-2-117)/(-3-72) 110/57  MAP:  [54 mmHg-91 mmHg] 76 mmHg  BP - Mean:  [42-90] 78  Resp: 21    No lab results found in last 7 days.    GEN: no acute distress   HEENT: head ncat, sclera anicteric, OP patent, trachea midline   PULM: unlabored synchronous with vent, clear anteriorly    CV/COR: RRR S1S2 no gallop,  No rub, no murmur  ABD: soft nontender, hypoactive bowel sounds, no mass  EXT:  warm and well perfused x4  NEURO: PERRL, no obvious deficits  SKIN: no obvious rash  LINES: clean, dry intact         Data:   All data and imaging reviewed     ROUTINE ICU LABS (Last four results)  CMP  Recent Labs   Lab 10/05/23  0251 10/05/23  0245 10/03/23  1638 10/01/23  0631 09/29/23  0552    136 137 134* 138   POTASSIUM 3.6 3.8 4.5 4.9 4.5   CHLORIDE  --  90* 94* 91* 97*   CO2  --  24 24 27 22   ANIONGAP  --  22* 19* 16* 19*   GLC  --  127* 113* 106* 92   BUN  --  44.5* 73.8* 68.9* 87.9*   CR  --  5.77* 8.64* 8.41* 8.87*   GFRESTIMATED  --  11* 7* 7* 6*   KELLY  --  9.4 9.0 9.2 9.4   MAG  --  2.1  --   --   --    PHOS  --   --   --   --  6.1*   PROTTOTAL  --  7.6  --   --   --    ALBUMIN  --  3.9  --   --  3.8   BILITOTAL  --  1.3*  --   --   --    ALKPHOS  --  252*  --   --   --    AST  --  17  --   --   --    ALT  --  13  --   --   --      CBC  Recent Labs   Lab 10/05/23  0251 10/05/23  0245 10/03/23  1638 10/01/23  0631  23  0552   WBC  --  9.9 4.8 4.2 2.8*   RBC  --  3.68* 3.16* 3.08* 2.99*   HGB 12.6* 11.4* 9.8* 9.6* 9.3*   HCT 37* 35.2* 30.7* 30.2* 28.9*   MCV  --  96 97 98 97   MCH  --  31.0 31.0 31.2 31.1   MCHC  --  32.4 31.9 31.8 32.2   RDW  --  14.3 14.6 14.6 13.8   PLT  --  146* 93* 71* 77*     INR  Recent Labs   Lab 10/05/23  0245   INR 1.55*     Arterial Blood GasNo lab results found in last 7 days.    All cultures:  No results for input(s): CULT in the last 168 hours.  Recent Results (from the past 24 hour(s))   POC US ECHO LIMITED    Impression    Limited bedside ultrasound of the heart demonstrates a small pericardial effusion and tachycardia.   POC US GUIDED VASCULAR ACCESS    Impression    Ultrasound was utilized to visualize cannulation of the right femoral vein, needle was visualized cannulating the vein and subsequently wire was noted to be in the lumen of the vein.  No complications.   Echo Limited   Result Value    LVEF  65-70%    Narrative    [id number]  [id number]  [id number]  [id number]^SONIDO^DONA^SUSHMA     [facility]  Echocardiography Laboratory  [address] [place] [id number]     Name: MARY JO CRAIN  MRN: [id number]  : 1965  Study Date: 10/05/2023 04:17 AM  Age: 58 yrs  Gender: Male  Patient Location: [facility]  Reason For Study: Pericardial Effusion  Ordering Physician: DONA HEAD  Referring Physician: Ki Carter  Performed By: Kyle Angelo RDCS     BSA: 2.1 m2  Height: 72 in  Weight: 190 lb  HR: 105  BP: 104/74 mmHg  ______________________________________________________________________________  Procedure  Limited Portable Echo Adult. Optison (NDC #4004-9814) given intravenously.  ______________________________________________________________________________  Interpretation Summary     The rhythm appears to be atrial fibrillation with RVR. Confirm with ECG.     A small circumferential pericardial effusion is present with a moderate amount  of  pericardial effusion posteriorly.  There is no respiratory variation of mitral inflow or cardiac chamber  compression/collapse that would suggest tamponade. IVC is normal in size.     There are no echocardiographic indications of cardiac tamponade.     Compareed with the previous study. afib with RVR is new. The pericardial  effusion has increased in size slightly.  ______________________________________________________________________________  Left Ventricle  The left ventricle is normal in size. There is normal left ventricular wall  thickness. Left ventricular systolic function is normal. The visual ejection  fraction is 65-70%. Normal left ventricular wall motion.     Right Ventricle  The right ventricle is normal in structure, function and size.     Atria  Normal left atrial size. Right atrial size is normal. There is no atrial shunt  seen.     Mitral Valve  The mitral valve is normal in structure and function. There is trace mitral  regurgitation.     Tricuspid Valve  The tricuspid valve is normal in structure and function. There is trace  tricuspid regurgitation. Right ventricular systolic pressure could not be  approximated due to inadequate tricuspid regurgitation. IVC diameter <2.1 cm  collapsing >50% with sniff suggests a normal RA pressure of 3 mmHg.     Aortic Valve  The aortic valve is trileaflet with aortic valve sclerosis. There is trace  aortic regurgitation. No aortic stenosis is present.     Pulmonic Valve  The pulmonic valve is not well seen, but is grossly normal. There is trace  pulmonic valvular regurgitation.     Vessels  Normal size aorta. The inferior vena cava was normal in size with preserved  respiratory variability.     Pericardium  A small circumferential pericardial effusion is present with a moderate amount  of pericardial effusion posteriorly.  There is no respiratory variation of mitral inflow or cardiac chamber  compression/collapse that would suggest tamponade. IVC is normal in  size.  There are no echocardiographic indications of cardiac tamponade.     Rhythm  The rhythm appears to be atrial fibrillation with RVR. Confirm with ECG.  ______________________________________________________________________________  Report approved by: Tereso Fuller 10/05/2023 05:38 AM     ______________________________________________________________________________      CT Chest (PE) Abdomen Pelvis w Contrast   Result Value    Radiologist flags (AA)     New diagnosis of pulmonary embolism, as well as a large pericardial effusion.    Narrative    EXAM: CT CHEST PE ABDOMEN PELVIS W CONTRAST  LOCATION: [facility]  DATE: 10/5/2023    INDICATION: Hypotension, recent pericardial effusion  COMPARISON: CT abdomen/pelvis without contrast 03/09/2023, CT chest without contrast 03/07/2023  TECHNIQUE: CT chest pulmonary angiogram and routine CT abdomen pelvis with IV contrast. Arterial phase through the chest and venous phase through the abdomen and pelvis. Multiplanar reformats and MIP reconstructions were performed. Dose reduction   techniques were used.   CONTRAST: 90mL Isovue 370    FINDINGS:    ANGIOGRAM CHEST: Pulmonary arteries are increased in size with the main pulmonary artery measuring 39 mm (series 6 image 83). Segmental and subsegmental pulmonary artery emboli to the left upper lobe.  No definite right heart strain. Thoracic aorta is   negative for dissection.     LUNGS AND PLEURA: Small volume loculated right pleural effusion with thickening of the pleura redemonstrated. Right lung granulomas. Right lower lobe consolidation suspicious for pneumonia. Small left pleural effusion with compressive atelectasis.   Smaller consolidation in the inferior left lower lobe also suspicious for pneumonia.    MEDIASTINUM/AXILLAE: Cardiomegaly. Large pericardial effusion. This measures in greatest depth of 25 mm which is suggestive of at least 500 mL of fluid. Right internal jugular dual-lumen  central venous catheter which terminates at the junction of the   right atrium and suprahepatic IVC. Prominent mediastinal and hilar lymph nodes, likely reactive.    CORONARY ARTERY CALCIFICATION: Moderate.    HEPATOBILIARY: Prior liver transplant. Tiny hepatic cyst requiring no dedicated follow-up. No focal suspicious hepatic lesion. Patent portal veins. Cholecystectomy. Mildly prominent intrahepatic bile ducts.    PANCREAS: Normal.    SPLEEN: Splenomegaly.    ADRENAL GLANDS: Normal.    KIDNEYS/BLADDER: Bilateral nonobstructing calyceal calculi measuring up to 5 mm in the right upper pole. No hydronephrosis. Decompressed urinary bladder.    BOWEL: Small to moderate abdominopelvic ascites. Prominent gas and fluid-filled loops of small and large bowel likely reactive ileus. No bowel obstruction. Appendectomy.    LYMPH NODES: Normal.    VASCULATURE: Atherosclerotic calcifications of the aortoiliac vessels. No AAA. Multiple upper abdominal collaterals. Right femoral approach arterial catheter which terminates in the right external iliac artery. Right femoral approach venous catheter   which terminates in the right common iliac vein.    PELVIC ORGANS: Prominent prostate.    MUSCULOSKELETAL: Small inguinal hernias containing fat and ascites. Gynecomastia. Multilevel degenerative changes of the thoracic and lumbosacral spine. Mild wedge compression deformities of T10 and L1 are unchanged. No definite acute osseous   abnormality.      Impression    IMPRESSION:    1.  Large pericardial effusion. This measures 25 mm in greatest depth which is suggestive of at least 500 mL of fluid.  2.  Pulmonary arteries are increased in size with the main pulmonary artery measuring 39 mm. This is consistent with pulmonary arterial hypertension. Segmental and subsegmental pulmonary artery emboli are seen to the left upper lobe. No definite right   heart strain.  3.  Small bilateral pleural effusions with the right effusion appearing  chronically loculated with pleural thickening. Bilateral lung consolidations right greater than left suspicious for multifocal pneumonia.  4.  Prior liver transplant. Multiple upper abdominal collaterals and splenomegaly suggestive of portal hypertension, with small to moderate abdominopelvic ascites.  5.  Prominent gas and fluid-filled loops of small and large bowel likely reactive ileus.      [Critical Result: New diagnosis of pulmonary embolism, as well as a large pericardial effusion.]    Finding was identified on 10/5/2023 6:14 AM CDT.     Dr. Carmona was contacted by me on 10/5/2023 6:39 AM CDT and verbalized understanding of the critical results.          Billing: This patient is critically ill: Yes. Total critical care time today 40 min, excluding procedures.       Past Medical/surgical hx, meds, allergies, social history, family history     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    Cancer (H) 2023    Squamous Cell Cancer- Since resolved    Cirrhosis of liver with ascites (H) 02/11/2016    Coagulopathy (H24)     Critical illness myopathy     Dialysis patient (H24)     DIALYSIS 3X/ WEEK    Encephalopathy     ESRD (end stage renal disease) on dialysis (H)     Gastroesophageal reflux disease     H/O alcohol abuse     History of blood transfusion     Hyperammonemia (H24)     Hyperlipidemia     Hypertension     Patients states that HTN has been resolved    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    Insomnia     Lactic acidosis 11/12/2022    Liver replaced by transplant (H) 09/20/2016    NAFLD (nonalcoholic fatty liver disease) 02/11/2016    CHRISTIAN on CPAP     Parainfluenza type 1 infection 11/19/2022    Parapneumonic effusion     Pleural effusion      Pneumothorax on left 12/16/2022    Respiratory acidosis 11/12/2022    Respiratory arrest (H) 11/12/2022    Restless legs syndrome (RLS)     SBP (spontaneous bacterial peritonitis) (H)     MNGI    Seizures (H) 12/2022    Sepsis due to Streptococcus pneumoniae with acute hypoxic respiratory failure (H) 11/19/2022    Stroke, embolic (H) 11/20/2022    Sudden cardiac arrest (H) 12/29/2022    PEA- 2 min of CPR    Tubular adenoma 10/2019    Large cecal adenoma- due for surveillance colonoscopy in 3 years (10/2022)     Past Surgical History:   Procedure Laterality Date    APPENDECTOMY      BENCH LIVER N/A 09/20/2016    Procedure: BENCH LIVER;  Surgeon: Enoc Crews MD;  Location: [facility] OR    BRONCHOSCOPY FLEXIBLE AND RIGID N/A 12/20/2022    Procedure: BRONCHOSCOPY;  Surgeon: Alena Valenzuela MD;  Location:  GI    COLONOSCOPY  08/06/2013    repeat in 2018    COLONOSCOPY N/A 10/04/2019    Procedure: COLONOSCOPY, WITH POLYPECTOMY AND BIOPSY;  Surgeon: Go Chong MD;  Location: [facility] OR    CREATE FISTULA ARTERIOVENOUS UPPER EXTREMITY Left 03/21/2023    Procedure: LEFT UPPER EXTREMITY ARTERIOVENOUS FISTULA CREATION. LIGATION OF COMPETING VASCULAR BRANCHES- LEFT;  Surgeon: Deejay Mcneil MD;  Location:  OR    CV PERICARDIOCENTESIS N/A 9/29/2023    Procedure: Pericardiocentesis;  Surgeon: Barry Mckeon MD;  Location:  HEART CARDIAC CATH LAB    HERNIA REPAIR      IR CHEST TUBE PLACEMENT NON-TUNNELED RIGHT  12/12/2022    IR CVC TUNNEL PLACEMENT > 5 YRS OF AGE  12/06/2022    IR DIALYSIS FISTULOGRAM LEFT  07/13/2023    IR GASTROSTOMY TUBE CHANGE  02/08/2023    IR GASTROSTOMY TUBE PERCUTANEOUS PLCMNT  11/29/2022    IR PARACENTESIS  11/29/2022    IR PARACENTESIS  12/01/2022    IR THORACENTESIS  12/06/2022    IR THORACENTESIS  09/01/2020    IR THORACENTESIS  08/10/2020    IR TRANSCATHETER BIOPSY  12/01/2022    REVISION FISTULA ARTERIOVENOUS UPPER EXTREMITY Left 8/15/2023    Procedure: REPAIR OF  LEFT ARM FISTULA PSEUDOANEURYSM x 2; OUTFLOW REVISION CEPHALIC TO BRACHIAL VEIN;  Surgeon: Deejay Mcneil MD;  Location: [facility] OR    TRACHEOSTOMY N/A 2022    Procedure: TRACHEOSTOMY;  Surgeon: Nilesh Jackson MD;  Location: [facility] OR    TRANSPLANT LIVER RECIPIENT  DONOR N/A 2016    Procedure: TRANSPLANT LIVER RECIPIENT  DONOR;  Surgeon: Enoc Crews MD;  Location: [facility] OR     No current facility-administered medications on file prior to encounter.  acetaminophen (TYLENOL) 325 MG tablet, Take 2 tablets (650 mg) by mouth every 4 hours as needed for other (For optimal non-opioid multimodal pain management to improve pain control.)  apixaban ANTICOAGULANT (ELIQUIS) 5 MG tablet, Take 1 tablet (5 mg) by mouth 2 times daily for 90 days  gabapentin (NEURONTIN) 100 MG capsule, Take 1 capsule (100 mg) by mouth daily  hydrOXYzine (ATARAX) 25 MG tablet, Take 1 tablet (25 mg) by mouth 3 times daily as needed for itching  Lacosamide (VIMPAT) 100 MG TABS tablet, Take 1 tablet (100 mg) by mouth every evening  lacosamide (VIMPAT) 50 MG TABS tablet, Take 50 mg by mouth every morning  melatonin 3 MG tablet, Take 1 tablet (3 mg) by mouth At Bedtime  midodrine (PROAMATINE) 10 MG tablet, Take 1 tab 3xDay, during patient's dialysis days , Monday, Wednesday, Friday, patient takes 2 extra Midodrine one hour before dialysis, takes 1 more Midodrine when he gets there, and takes 1 more tablet during dialysis, this is an addition to patient's scheduled doses  multivitamin RENAL (TRIPHROCAPS) 1 capsule capsule, Take 1 capsule by mouth daily  oxyCODONE (ROXICODONE) 5 MG tablet, Take 1 tablet (5 mg) by mouth every 4 hours as needed for moderate pain  rOPINIRole (REQUIP) 0.5 MG tablet, Take 0.5 mg by mouth 2 times daily as needed (restless legs)  sevelamer carbonate (RENVELA) 800 MG tablet, Take 800 mg by mouth 4 times daily With meals and snacks  tacrolimus (GENERIC EQUIVALENT) 1 MG capsule, Take 1  capsule (1 mg) by mouth every 12 hours       No Known Allergies  Social History     Socioeconomic History    Marital status:      Spouse name: Not on file    Number of children: Not on file    Years of education: Not on file    Highest education level: Not on file   Occupational History    Not on file   Tobacco Use    Smoking status: Never    Smokeless tobacco: Never   Vaping Use    Vaping Use: Never used   Substance and Sexual Activity    Alcohol use: Not Currently     Alcohol/week: 0.0 standard drinks of alcohol    Drug use: No    Sexual activity: Not Currently   Other Topics Concern    Parent/sibling w/ CABG, MI or angioplasty before 65F 55M? Not Asked   Social History Narrative    Not on file     Social Determinants of Health     Financial Resource Strain: Not on file   Food Insecurity: Not on file   Transportation Needs: Not on file   Physical Activity: Not on file   Stress: Not on file   Social Connections: Not on file   Interpersonal Safety: Low Risk  (9/21/2023)    Interpersonal Safety     Do you feel physically and emotionally safe where you currently live?: Yes     Within the past 12 months, have you been hit, slapped, kicked or otherwise physically hurt by someone?: No     Within the past 12 months, have you been humiliated or emotionally abused in other ways by your partner or ex-partner?: No   Housing Stability: Not on file     Family History   Problem Relation Age of Onset    Coronary Artery Disease No family hx of     Cardiomyopathy No family hx of                   Malika Cassidy, MS4  [employer]        Physician Attestation   I, Laura Fernandes MD, was present with the medical/HARRY student who participated in the service and in the documentation of the note.  I have verified the history and personally performed the physical exam and medical decision making.  I agree with the assessment and plan of care as documented in the note.      Key findings: Please see my note dated 10/5 for  details of assessment and plan.     Laura Fernandes MD  Date of Service (when I saw the patient): 10/5

## 2023-10-05 NOTE — CONSULTS
Cardiology Progress Note          Assessment and Plan:       Pulmonary infiltrates    ESRD (end stage renal disease) on dialysis (H)    Pericardial effusion    Wide-complex tachycardia    Elevated troponin    Atrial fibrillation with rapid ventricular response (H)    Severe sepsis (H)    Multiple subsegmental pulmonary emboli without acute cor pulmonale (H)    S/p Liver transplant     Thrombocytopenia  Called emergently for cardiology consult for ventricular tachycardia and pulmonary embolism    Small- Moderate pericardial effusion recurrent no evidence of tamponade  Secondary to thrombocytopenia, OAC, liver transplant unfortunately need to continue heparin with pulmonary embolism  Afib- Review of strips shows SVT with aberrancy no VT  Continue amiodarone for now  Hemodynamically stable but requiring pressors.  Could support with fluids as needed since getting dialysis tomorrow.         60 min of critical care time spent               Interval History:     Blanco Osborne is a 58-year-old male with a complex past medical history including end-stage renal disease on dialysis, history of renal liver transplant approximate 7 years ago, previously recently admitted for bloody pericardial effusion seen by Dr. Bingham from our clinic and underwent Thora pericardiocentesis and drainage with removal of bloody fluid.  He comes in with chest pain palpitations diaphoresis paleness.  Work-up and evaluation CT showed multiple pulmonary emboli.  In the emergency room he went into A-fib with rapid ventricular sponsor and wide-complex tachycardia.  Review of the rhythm shows SVT with a Vela C.  He can was attempted cardioversion however this was unsuccessful but after placing patient on amiodarone and placing up in the intensive care unit patient was converted to normal sinus rhythm.    Patient currently having some mild chest discomfort and abdominal discomfort.  EKG looks normal.  Blood pressure is 110/74 on nor epi and Levophed.   Eliquis is being held and he is on intravenous heparin.    Review of echocardiogram emergently shows a small amount of pericardial fusion which is mostly.  Posterior.  There is only approximately 1 cm fluid apically.  There is no evidence of tamponade on echocardiogram.  Lab results have elevated elevated LFTs alkaline phosphatase 189 and 252.              Review of Systems:   As per subjective, otherwise 5 systems reviewed and negative.           Physical Exam:   Blood pressure 101/66, pulse 53, temperature 97.8  F (36.6  C), temperature source Oral, resp. rate 20, height 1.829 m (6'), weight 93.2 kg (205 lb 7.5 oz), SpO2 94 %.      Vital Sign Ranges  Temperature Temp  Av  F (36.7  C)  Min: 97.8  F (36.6  C)  Max: 98.2  F (36.8  C)   Blood pressure Systolic (24hrs), Av , Min:65 , Max:104        Diastolic (24hrs), Av, Min:36, Max:84      Pulse Pulse  Av.7  Min: 51  Max: 156   Respirations Resp  Av.6  Min: 5  Max: 105   Pulse oximetry SpO2  Av.3 %  Min: 89 %  Max: 100 %         Intake/Output Summary (Last 24 hours) at 10/5/2023 1202  Last data filed at 10/5/2023 1000  Gross per 24 hour   Intake 342.88 ml   Output --   Net 342.88 ml       Constitutional:   NAD   Skin:   Warm and dry   Head:   Nontraumatic   Neck:   Supple, symmetrical, trachea midline, no adenopathy, thyroid symmetric, not enlarged and no tenderness, skin normal   Lungs:   scattered wheezes   Cardiovascular:   regular rate and rhythm no murmur noted   Abdomen:   Benign   Extremities and Back:   Symmetric, no curvature, spinous processes are non-tender on palpation, paraspinous muscles are non-tender on palpation, no costal vertebral tenderness   Neurological:   Grossly nonfocal            Medications:     No current outpatient medications on file.                Data:

## 2023-10-05 NOTE — ED PROVIDER NOTES
History     Chief Complaint:  Chest Pain       HPI   Blanco Osborne is a 58 year old male with complex past medical history including end-stage renal disease on dialysis Mondays Wednesdays and Fridays, status post liver transplant approximately 7 years ago.  He was recently admitted to [facility] for a pericardial effusion and had this drained.  Tonight he states that he awoke with chest discomfort and palpitations, wife relays that he was diaphoretic and pale.  He has not had similar symptoms in the past.  Of note the wife states that his dialysis run was shortened on Monday and they tried to increase the duration of his run on Wednesday.  He states that when he was in the hospital recently he was off of his apixaban for approximately 48 hours.  There are no further aggravating or alleviating factors and no further associated symptoms.  He denies any fevers at home.      Independent Historian:    Spouse/partner - They report the above    Review of External Notes:  Recent discharge summary and emergency department visit at [facility] was reviewed    Medications:    acetaminophen (TYLENOL) 325 MG tablet  apixaban ANTICOAGULANT (ELIQUIS) 5 MG tablet  gabapentin (NEURONTIN) 100 MG capsule  hydrOXYzine (ATARAX) 25 MG tablet  Lacosamide (VIMPAT) 100 MG TABS tablet  lacosamide (VIMPAT) 50 MG TABS tablet  melatonin 3 MG tablet  midodrine (PROAMATINE) 10 MG tablet  multivitamin RENAL (TRIPHROCAPS) 1 capsule capsule  oxyCODONE (ROXICODONE) 5 MG tablet  rOPINIRole (REQUIP) 0.5 MG tablet  sevelamer carbonate (RENVELA) 800 MG tablet  tacrolimus (GENERIC EQUIVALENT) 1 MG capsule        Past Medical History:    Past Medical History:   Diagnosis Date    Acquired immunocompromised state (H24) 11/19/2022    Acute renal failure, unspecified acute renal failure type (H24) 11/12/2022    Antiplatelet or antithrombotic long-term use     Arrhythmia     Ascites     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    Cancer (H) 2023     Cirrhosis of liver with ascites (H) 02/11/2016    Coagulopathy (H24)     Critical illness myopathy     Dialysis patient (H24)     Encephalopathy     ESRD (end stage renal disease) on dialysis (H)     Gastroesophageal reflux disease     H/O alcohol abuse     History of blood transfusion     Hyperammonemia (H24)     Hyperlipidemia     Hypertension     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    Insomnia     Lactic acidosis 11/12/2022    Liver replaced by transplant (H) 09/20/2016    NAFLD (nonalcoholic fatty liver disease) 02/11/2016    CHRISTIAN on CPAP     Parainfluenza type 1 infection 11/19/2022    Parapneumonic effusion     Pleural effusion     Pneumothorax on left 12/16/2022    Respiratory acidosis 11/12/2022    Respiratory arrest (H) 11/12/2022    Restless legs syndrome (RLS)     SBP (spontaneous bacterial peritonitis) (H)     Seizures (H) 12/2022    Sepsis due to Streptococcus pneumoniae with acute hypoxic respiratory failure (H) 11/19/2022    Stroke, embolic (H) 11/20/2022    Sudden cardiac arrest (H) 12/29/2022    Tubular adenoma 10/2019       Past Surgical History:    Past Surgical History:   Procedure Laterality Date    APPENDECTOMY      BENCH LIVER N/A 09/20/2016    Procedure: BENCH LIVER;  Surgeon: Enoc Crews MD;  Location: [facility] OR    BRONCHOSCOPY FLEXIBLE AND RIGID N/A 12/20/2022    Procedure: BRONCHOSCOPY;  Surgeon: Alena Valenzuela MD;  Location:  GI    COLONOSCOPY  08/06/2013    repeat in 2018    COLONOSCOPY N/A 10/04/2019    Procedure: COLONOSCOPY, WITH POLYPECTOMY AND BIOPSY;  Surgeon: Go Chong MD;  Location:  OR    CREATE FISTULA ARTERIOVENOUS UPPER EXTREMITY Left 03/21/2023    Procedure: LEFT UPPER EXTREMITY ARTERIOVENOUS FISTULA CREATION. LIGATION OF COMPETING VASCULAR BRANCHES- LEFT;  Surgeon: Deejay Mcneil MD;  Location:  OR    CV PERICARDIOCENTESIS N/A 9/29/2023    Procedure: Pericardiocentesis;  Surgeon: Barry Mckeon MD;  Location:   HEART CARDIAC CATH LAB    HERNIA REPAIR      IR CHEST TUBE PLACEMENT NON-TUNNELED RIGHT  2022    IR CVC TUNNEL PLACEMENT > 5 YRS OF AGE  2022    IR DIALYSIS FISTULOGRAM LEFT  2023    IR GASTROSTOMY TUBE CHANGE  2023    IR GASTROSTOMY TUBE PERCUTANEOUS PLCMNT  2022    IR PARACENTESIS  2022    IR PARACENTESIS  2022    IR THORACENTESIS  2022    IR THORACENTESIS  2020    IR THORACENTESIS  08/10/2020    IR TRANSCATHETER BIOPSY  2022    REVISION FISTULA ARTERIOVENOUS UPPER EXTREMITY Left 8/15/2023    Procedure: 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 2; OUTFLOW REVISION CEPHALIC TO BRACHIAL VEIN;  Surgeon: Deejay Mcneil MD;  Location: [facility] OR    TRACHEOSTOMY N/A 2022    Procedure: TRACHEOSTOMY;  Surgeon: Nilesh Jackson MD;  Location:  OR    TRANSPLANT LIVER RECIPIENT  DONOR N/A 2016    Procedure: TRANSPLANT LIVER RECIPIENT  DONOR;  Surgeon: Enoc Crews MD;  Location: [facility] OR          Physical Exam   Patient Vitals for the past 24 hrs:   BP Temp Temp src Pulse Resp SpO2 Height Weight   10/05/23 0705 98/ -- -- 56 16 94 % -- --   10/05/23 0700 96/ -- -- 56 (!) 6 93 % -- --   10/05/23 0655 99/ -- -- 57 (!) 7 94 % -- --   10/05/23 0650 97/ -- -- 58 (!) 5 94 % -- --   10/05/23 0645 98/ -- -- 66 18 96 % -- --   10/05/23 0640 (!) 82/56 -- -- 70 12 92 % -- --   10/05/23 0635 (!) 78/56 -- -- 67 17 94 % -- --   10/05/23 0630 97/63 -- -- 62 17 94 % -- --   10/05/23 0604 100/65 -- -- 66 20 93 % -- --   10/05/23 0514 101/ -- -- 106 (!) 101 91 % -- --   10/05/23 0509 102/63 -- -- (!) 129 (!) 105 91 % -- --   10/05/23 0504 90/70 -- -- 112 (!) 101 91 % -- --   10/05/23 0454 101/69 -- -- (!) 131 (!) 97 (!) 89 % -- --   10/05/23 0444 102/84 -- -- 118 (!) 70 92 % -- --   10/05/23 0426 -- -- -- 120 (!) 97 93 % -- --   10/05/23 0425 (!) 104/36 -- -- 118 -- -- -- --   10/05/23 0420 104/74 -- -- (!) 121 (!) 95 94 %  -- --   10/05/23 0415 (!) 79/67 -- -- (!) 122 (!) 95 95 % -- --   10/05/23 0400 -- -- -- -- -- -- -- 89 kg (196 lb 3.4 oz)   10/05/23 0350 (!) 75/55 -- -- 114 20 100 % -- --   10/05/23 0344 (!) 71/61 -- -- (!) 124 20 100 % -- --   10/05/23 0342 -- -- -- -- -- -- 1.829 m (6') 86.2 kg (190 lb)   10/05/23 0339 (!) 84/63 -- -- (!) 125 21 100 % -- --   10/05/23 0335 (!) 83/65 -- -- (!) 125 24 100 % -- --   10/05/23 0330 (!) 86/62 -- -- (!) 125 20 100 % -- --   10/05/23 0240 -- -- -- (!) 156 (!) 35 97 % -- --   10/05/23 0235 (!) 75/65 -- -- 105 29 -- -- --   10/05/23 0233 (!) 75/65 -- -- (!) 150 -- 95 % -- --   10/05/23 0232 (!) 65/41 98.2  F (36.8  C) Oral (!) 150 16 -- -- --        Physical Exam  General: Alert, interactive in moderate severe distress  Head:  Scalp is atraumatic  Eyes:  The pupils are equal, round, and reactive to light    EOM's intact    No scleral icterus  ENT:      Nose:  The external nose is normal  Ears:  External ears are normal  Mouth/Throat: The oropharynx is normal    Mucus membranes are dry      Neck:  Normal range of motion.      There is no rigidity.    Trachea is in the midline         CV:  Irregularly irregular and tachycardic, faint heart tones, trace pedal edema bilateral lower extremities  Resp:  Breath sounds are coarse bilaterally    Non-labored, no retractions or accessory muscle use      GI:  Abdomen is soft, diffuse distension, no reproducible tenderness.       MS:  Normal strength in all 4 extremities  Skin:  Pale, diaphoretic  Neuro:   Strength 5/5 x4.  Sensation intact  In all 4 extremities.        Psych: Awake. Alert.  Normal affect.      Appropriate interactions.    Emergency Department Course   ECG  ECG taken at 0225, ECG read at 0240  Supraventricular tachycardia with frequent premature ventricular complexes  Cannot rule out Anterior infarct, age undetermined  Rate 154 bpm. MO interval * ms. QRS duration 72 ms. QT/QTc 326/522 ms. P-R-T axes * 39 28.     ECG #2  ECG taken at  0246, ECG read at 0248  Atrial fibrillation [facility] premature ventricular or aberrantly conducted complex   Nonspecific ST abnormality  Rate 98 bpm. WI interval * ms. QRS duration 74 ms. QT/QTc 370/472 ms. P-R-T axes * 46 40.     ECG #3  ECG taken at 0555, ECG read at 0602  Normal sinus rhythm   When compared with prior ECG, resolution of afib  Rate 68 bpm. WI interval 188 ms. QRS duration 80 ms. QT/QTc 440/467 ms. P-R-T axes 57 39 54.    Imaging:  CT Chest (PE) Abdomen Pelvis w Contrast   Final Result   Abnormal   IMPRESSION:      1.  Large pericardial effusion. This measures 25 mm in greatest depth which is suggestive of at least 500 mL of fluid.   2.  Pulmonary arteries are increased in size with the main pulmonary artery measuring 39 mm. This is consistent with pulmonary arterial hypertension. Segmental and subsegmental pulmonary artery emboli are seen to the left upper lobe. No definite right    heart strain.   3.  Small bilateral pleural effusions with the right effusion appearing chronically loculated with pleural thickening. Bilateral lung consolidations right greater than left suspicious for multifocal pneumonia.   4.  Prior liver transplant. Multiple upper abdominal collaterals and splenomegaly suggestive of portal hypertension, with small to moderate abdominopelvic ascites.   5.  Prominent gas and fluid-filled loops of small and large bowel likely reactive ileus.         [Critical Result: New diagnosis of pulmonary embolism, as well as a large pericardial effusion.]      Finding was identified on 10/5/2023 6:14 AM CDT.       Dr. Carmona was contacted by me on 10/5/2023 6:39 AM CDT and verbalized understanding of the critical results.       Echo Limited   Final Result      POC US GUIDED VASCULAR ACCESS   Preliminary Result   Ultrasound was utilized to visualize cannulation of the right femoral vein, needle was visualized cannulating the vein and subsequently wire was noted to be in the lumen of the vein.  No  complications.      POC US ECHO LIMITED   Final Result   Limited bedside ultrasound of the heart demonstrates a small pericardial effusion and tachycardia.        Report per radiology    Laboratory:  Labs Ordered and Resulted from Time of ED Arrival to Time of ED Departure   INR - Abnormal       Result Value    INR 1.55 (*)    PARTIAL THROMBOPLASTIN TIME - Abnormal    aPTT 48 (*)    COMPREHENSIVE METABOLIC PANEL - Abnormal    Sodium 136      Potassium 3.8      Carbon Dioxide (CO2) 24      Anion Gap 22 (*)     Urea Nitrogen 44.5 (*)     Creatinine 5.77 (*)     GFR Estimate 11 (*)     Calcium 9.4      Chloride 90 (*)     Glucose 127 (*)     Alkaline Phosphatase 252 (*)     AST 17      ALT 13      Protein Total 7.6      Albumin 3.9      Bilirubin Total 1.3 (*)    TROPONIN T, HIGH SENSITIVITY - Abnormal    Troponin T, High Sensitivity 346 (*)    AMMONIA - Abnormal    Ammonia 14 (*)    CBC WITH PLATELETS AND DIFFERENTIAL - Abnormal    WBC Count 9.9      RBC Count 3.68 (*)     Hemoglobin 11.4 (*)     Hematocrit 35.2 (*)     MCV 96      MCH 31.0      MCHC 32.4      RDW 14.3      Platelet Count 146 (*)     % Neutrophils 41      % Lymphocytes 45      % Monocytes 11      Mids % (Monos, Eos, Basos)        % Eosinophils 3      % Basophils 0      % Immature Granulocytes 0      NRBCs per 100 WBC 0      Absolute Neutrophils 4.0      Absolute Lymphocytes 4.4      Absolute Monocytes 1.1      Mids Abs (Monos, Eos, Basos)        Absolute Eosinophils 0.3      Absolute Basophils 0.0      Absolute Immature Granulocytes 0.0      Absolute NRBCs 0.0     ISTAT GASES LACTATE VENOUS POCT - Abnormal    Lactic Acid POCT 2.4 (*)     Bicarbonate Venous POCT 29 (*)     O2 Sat, Venous POCT 22 (*)     pCO2 Venous POCT 45      pH Venous POCT 7.41      pO2 Venous POCT 16 (*)    ISTAT GASES ELECTROLYTES VENOUS POCT - Abnormal    CPB Applied No      Hematocrit POCT 37 (*)     Bicarbonate Venous POCT 29 (*)     Hemoglobin POCT 12.6 (*)     Potassium POCT  3.6      Sodium POCT 135      pCO2 Venous POCT 45      pH Venous POCT 7.42      pO2 Venous POCT 16 (*)     O2 Sat, Venous POCT 21 (*)    ISTAT GASES LACTATE VENOUS POCT - Abnormal    Lactic Acid POCT 0.7      Bicarbonate Venous POCT 28      O2 Sat, Venous POCT 51 (*)     pCO2 Venous POCT 49      pH Venous POCT 7.37      pO2 Venous POCT 29     MAGNESIUM - Normal    Magnesium 2.1     LACTIC ACID WHOLE BLOOD   INFLUENZA A/B, RSV, & SARS-COV2 PCR   INFLUENZA A/B, RSV, & SARS-COV2 PCR   TYPE AND SCREEN, ADULT    ABO/RH(D) O POS      Antibody Screen Negative      SPECIMEN EXPIRATION DATE [id number]     BLOOD CULTURE   BLOOD CULTURE   ABO/RH TYPE AND SCREEN        Procedures     Arterial Line Insertion      Procedure: Arterial Line Placement     Indication Critical care blood pressure monitoring and Hypotension    Consent:   Verbal from Patient     Location: Right Femoral     Preparation: Chlorhexidine     Anesthesia/Sedation: Lidocaine - 1%    Procedure Detail: Seldinger Technique   The patient was properly positioned. The skin was prepped and draped with sterile technique. Local anesthesia was infiltrated. The artery was identified by (drop down palpation, ultrasound, landmarks). The needle was advanced until a flash of arterial blood confirmed position within the lumen of the artery. The guidewire was then advanced through the needle with ease. The needle was removed, a small incision was made with an 11 blade along the wire, the catheter was passed over the guidewire and the guidewire was removed.  Continuous flow of arterial blood confirmed position within the lumen of the artery.     Ultrasound was utilized to visualize cannulation of the vessel  Pressure tubing was attached to the catheter.  The catheter was secured.  A sterile dressing was placed.    Patient Status: The patient tolerated the procedure well: Yes. There were no complications.         Central Line Placement     Procedure:  Central Venous Catheter  Insertion     Indication: Vasopressor administration and Vascular access    Consent:  Verbal from Patient  Risk Discussed: bleeding or infection    Universal Protocol: Universal protocol was followed and time out conducted just prior to starting procedure, confirming patient identity, site/side, procedure, patient position, and availability of correct equipment and implants.     Anesthesia/Sedation: Lidocaine - 1%    Procedure Detail:    Central line bundle elements were complete including preparatory hand leansing, donning full barrier precautions, use of a full body drape and chlorhxidine gluconate skin prep.   The Right internal femoral vein was selected as the optimal location for patient s condition.   Ultrasound was utilized for guidance: Yes, see separate procedural guidance POCUS note   A finder needle was used to enter the vein, through which a guidewire was inserted. The finder needle was then removed and the tract was dilated.   A triple lumen central venous catheter was inserted over the guidewire without difficulty. The guidewire was removed and the catheter was sutured in place and covered with an appropriate dressing.   A post-procedure chest radiograph was performed.     Patient Status:  The patient tolerated the procedure well: Yes. There were no complications.         Electrical Cardioversion         Procedure: Electrical Cardioversion        Indication: Tachydysrhymia     Consent: Unable/Emergent     Universal Protocol: Universal protocol was followed and time out conducted just prior to starting procedure, confirming patient identity, site/side, procedure, patient position, and availability of correct equipment and implants.      Anesthesia/Sedation: Midazolam 2 mg IV x1     Procedure Detail:   Electrodes were placed: Anterior/posterior  Trial #1: Synchronized Cardioversion at 100 Joules   Trial #2: Synchronized Cardioversion at 200 Joules   Cardioversion was unsuccessful      Patient Status:   The patient tolerated the procedure well: Yes. There were no complications.   Emergency Department Course & Assessments:  Interventions:  Medications   ondansetron (ZOFRAN) injection 4 mg (4 mg Intravenous $Given 10/5/23 0425)   calcium chloride 1 g in sodium chloride 0.9 % 100 mL intermittent infusion ( Intravenous Canceled Entry 10/5/23 0251)   amiodarone (CORDARONE) 1.8 mg/mL in sodium chloride 0.9% 500 mL ADULT STANDARD infusion (0 mg/min Intravenous ED/Periop/Clinic Infusing on Admission/transfer 10/5/23 0723)   amiodarone (CORDARONE) 1.8 mg/mL in sodium chloride 0.9% 500 mL ADULT STANDARD infusion (has no administration in time range)   norepinephrine (LEVOPHED) 4 mg in  mL infusion PREMIX (0.03 mcg/kg/min × 86.2 kg Intravenous $New Bag 10/5/23 0645)   heparin infusion 25,000 units in D5W 250 mL ANTICOAGULANT (0 Units/hr Intravenous ED/Periop/Clinic Infusing on Admission/transfer 10/5/23 0723)   lactated ringers BOLUS 500 mL (0 mLs Intravenous Stopped 10/5/23 0419)   midazolam (VERSED) injection 2 mg (2 mg Intravenous $Given 10/5/23 0253)   calcium chloride injection 1 g (1 g Intravenous $Given 10/5/23 0252)   amiodarone (NEXTERONE) bolus 150 mg (0 mg Intravenous Stopped 10/5/23 0327)   piperacillin-tazobactam (ZOSYN) 2.25 g vial to attach to  ml bag (0 g Intravenous Stopped 10/5/23 0419)   vancomycin (VANCOCIN) 1,750 mg in 0.9% NaCl 500 mL intermittent infusion (1,750 mg Intravenous $Given 10/5/23 0417)   sodium chloride (PF) 0.9% PF flush 10 mL (10 mLs Intravenous $Given 10/5/23 0433)   perflutren diluted 1mL to 2mL with saline (OPTISON) diluted injection 9 mL (9 mLs Intravenous $Given 10/5/23 0433)   aspirin (ASA) chewable tablet 324 mg (324 mg Oral $Given 10/5/23 0612)   iopamidol (ISOVUE-370) solution 90 mL (90 mLs Intravenous $Given 10/5/23 0610)   Saline Flush (95 mLs Intravenous $Given 10/5/23 0610)        Assessments:  Patient was evaluated at the bedside and multiple times during his stay  "in the emergency department    Independent Interpretation (X-rays, CTs, rhythm strip):  CT of the chest abdomen pelvis was reviewed, no obvious pneumothorax is appreciated    Consultations/Discussion of Management or Tests:  Dr. Anguiano from the intensive care unit       Social Determinants of Health affecting care:  None     Disposition:  The patient was admitted to the hospital under the care of Dr. Anguiano.     Impression & Plan    CMS Diagnoses: The patient has signs of Severe Sepsis        If one the following conditions is present, a 30 mL/kg bolus is recommended as part of the 6 hour bundle (IBW can be used for BMI >30, or document refusal/contraindication):      1.   Initial hypotension  defined as 2 bps < 90 or map < 65 in the 6hrs before or 3hrs after time zero.     2.  Lactate >4.      The patient has signs of Severe Sepsis as evidenced by:    1. 2 SIRS criteria, AND  2. Suspected infection, AND   3. Organ dysfunction:  SBP <90, MAP < 65, or SBP decrease of >40 from baseline due to infection and Lactic Acidosis with value >2.0    Time severe sepsis diagnosis confirmed: 0248  10/05/23 as this was the time when Lactate resulted, and the level was > 2.0    3 Hour Severe Sepsis Bundle Completion:    1. Initial Lactic Acid Result:   Recent Labs   Lab Test 10/05/23  0600 10/05/23  0248 04/06/23  0808   LACT 0.7 2.4* 1.3     2. Blood Cultures before Antibiotics: Yes  3. Broad Spectrum Antibiotics Administered:  yes       Anti-infectives (From admission through now)      Start     Dose/Rate Route Frequency Ordered Stop    10/05/23 0400  vancomycin (VANCOCIN) 1,750 mg in 0.9% NaCl 500 mL intermittent infusion         1,750 mg  over 2 Hours Intravenous ONCE 10/05/23 0313 10/05/23 0617    10/05/23 0330  piperacillin-tazobactam (ZOSYN) 2.25 g vial to attach to  ml bag        Note to Pharmacy: For [facility], [facility] and [facility]: For Zosyn-naive patients, use the \"Zosyn initial dose + extended infusion\" order panel.    2.25 " g  over 30 Minutes Intravenous ONCE 10/05/23 0305 10/05/23 0419            4. Is initial hypotension present?     Yes. (Definition - 2 SBPs <90, MAP <65, or decrease > 40 from baseline due to infection w/in 3 hrs of each other during the time period of 6 hrs before and 3 hrs after time zero)   Full 30 mL/kg bolus intentionally NOT administered to this patient due to ESRD. The target volume to infuse in this patient is 1000 mL.    BMI Readings from Last 1 Encounters:   10/05/23 26.61 kg/m      30 mL/kg fluids based on weight: 2,670 mL  30 mL/kg fluids based on IBW (must be >= 60 inches tall): 2,330 mL                    Severe Sepsis reassessment:  1. Repeat Lactic Acid Level within 6 hours of time zero: 0.7  2. Vasopressors started for Persistent Hypotension defined by the last 2 BP readings within the ONE HOUR following completion of the 30mL/kg bolus being low (SBP <90 or MAP <65).    I attest to having performed a repeat sepsis exam and assessment of perfusion at 0600 and the results demonstrate improved perfusion.    Medical Decision Making:  Patient presents via private vehicle with his wife.  He is got a complicated past medical history as noted above.  Upon his arrival here EKG was undertaken and was noted to be demonstrate a wide-complex tachycardia.  He was hypotensive with this and IV access was obtained tried briefly for fluid resuscitation and administered calcium given the patient's history of end-stage renal disease however i-STAT electrolytes demonstrate no hyperkalemia.  Subsequently the patient continued to remain hypotensive with prolonged runs of wide-complex tachycardia therefore decision was made to emergently cardiovert the patient, see the procedure note above, unfortunately this was unsuccessful.  Subsequently amiodarone was initiated and after some time the patient did convert to normal sinus rhythm, he was feeling much improved after this.  His electrocardiogram following cardioversion was  normal sinus rhythm with no signs of acute ischemia.  He does have an elevated troponin however it is unclear if this is secondary to underlying acute coronary syndrome, demand ischemia from his tachycardia, or his end-stage renal disease.  He has a known pericardial effusion, bedside echo demonstrates fluid therefore a formal echo was obtained with the read above.  There is no signs of tamponade on the echo report.  CT imaging of the chest abdomen pelvis demonstrate the findings above, the patient was anticoagulated with heparin, and received broad-spectrum antibiotic coverage for pulmonary infiltrates with concern for possible severe sepsis.  Patient responded nicely to judicious IV fluid resuscitation as well as administration of vasopressors.  Given the multitude of findings he will be brought into the hospital on the intensive care unit under the care of Dr. Anguiano and colleagues.  Patient and wife were informed of all the findings and were in agreement this plan of action.    Critical Care time:  was 80 minutes for this patient excluding procedures.    Diagnosis:    ICD-10-CM    1. Wide-complex tachycardia  R00.0       2. Atrial fibrillation with rapid ventricular response (H)  I48.91       3. ESRD (end stage renal disease) on dialysis (H)  N18.6     Z99.2       4. Severe sepsis (H)  A41.9     R65.20       5. Pericardial effusion  I31.39       6. Elevated troponin  R79.89       7. Multiple subsegmental pulmonary emboli without acute cor pulmonale (H)  I26.94       8. Pulmonary infiltrates  R91.8            Renato Carmona MD  10/5/2023   Trigger, Renato Garcia,*              Trigger, Renato Garcia MD  10/05/23 0740

## 2023-10-05 NOTE — CARE PLAN
Shift Summary    Neuro: A&O. Forgetful.   Cardiac: SB. On Levo to maintain MAP greater than 65. On heparin and Amio  Pulmonary: RA  GI/: Aneuric. Dialysis MWF  Skin: See chart  Activity: BR  Pain: Abd pain

## 2023-10-05 NOTE — CONSULTS
[facility]    RENAL CONSULTATION NOTE    REFERRING MD:  Dr. Fernandes    REASON FOR CONSULTATION:  ESKD, PE    DATE OF CONSULTATION: 10/05/23    SHORTHAND KEY FOR MY NOTES:  c = with, s = without, p = after, a = before, x = except, asx = asymptomatic, tx = transplant or treatment, sx = symptoms or symptomatic, cx = canceled or culture, rxn = reaction, yday = yesterday, nl = normal, abx = antibiotics, fxn = function, dx = diagnosis, dz = disease, m/h = melena/hematochezia, c/d/l/ha = cramping/dizziness/lightheadedness/headache, d/c = discharge or diarrhea/constipation, f/c/n/v = fevers/chills/nausea/vomiting, cp/sob = chest pain/shortness of breath, tbv = total body volume, rxn = reaction, tdc = tunneled dialysis catheter, pta = prior to admission, hd = hemodialysis, pd = peritoneal dialysis, hhd = home hemodialysis, edw = estimated dry wt    HPI: Blanco Osborne is a 58 year old male c ESKD 2 ATN (sepsis, meds) who dialyses MWF via a LAF/RIJ TDC at the [facility] under the care of Dr. Bradshaw and was admitted on 10/5/2023 c cp and found to have B pneumonia + PE + pericardial effusion.  Notes from Dr. Carmona (ER), Dr. Fernandes (ICU) and Dr. Hassan (Cards) reviewed.    Pt was recently admitted to [facility] for a pericardial effusion and had a pericardiocentesis performed.  He went home earlier this wk.  Yday, he woke up c cp and palpitations and was also diaphoretic.  He presented to the ER and he was quite hypotensive and tachycardic.  A central and art line were placed and cardioversion was attempted, but was unsuccessful.  Imaging showed pneumonia and a PE.  He was started on vanco/Zosyn.  An echo showed another pericardial effusion but no tamponade.  Cards was consulted.    The pt dialyses on a MWF basis and his run records from 10/4 were reviewed.  He was able to remove 1L and felt fine afterwards.  He takes midodrine pre/during the runs to help facilitate fluid removal.  He hasn't  achieved his dry wt and feels that it is incorrect bc he oftentimes feels d/l and tired after HD.  He recently infiltrated the LAF and has been using the RIJ TDC.     Currently, he has some abd and chest discomfort.  Breathing ok.      ROS:  A complete review of systems was performed and is negative x as noted above.    PMH:    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    Cancer (H) 2023    Squamous Cell Cancer- Since resolved    Cirrhosis of liver with ascites (H) 02/11/2016    Coagulopathy (H24)     Critical illness myopathy     Dialysis patient (H24)     DIALYSIS 3X/ WEEK    Encephalopathy     ESRD (end stage renal disease) on dialysis (H)     Gastroesophageal reflux disease     H/O alcohol abuse     History of blood transfusion     Hyperammonemia (H24)     Hyperlipidemia     Hypertension     Patients states that HTN has been resolved    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    Insomnia     Lactic acidosis 11/12/2022    Liver replaced by transplant (H) 09/20/2016    NAFLD (nonalcoholic fatty liver disease) 02/11/2016    CHRISTIAN on CPAP     Parainfluenza type 1 infection 11/19/2022    Parapneumonic effusion     Pleural effusion     Pneumothorax on left 12/16/2022    Respiratory acidosis 11/12/2022    Respiratory arrest (H) 11/12/2022    Restless legs syndrome (RLS)     SBP (spontaneous bacterial peritonitis) (H)     MNGI    Seizures (H) 12/2022    Sepsis due to Streptococcus pneumoniae with acute hypoxic respiratory failure (H) 11/19/2022    Stroke, embolic (H) 11/20/2022    Sudden cardiac arrest (H) 12/29/2022    PEA- 2 min of CPR    Tubular adenoma 10/2019    Large cecal adenoma- due for surveillance colonoscopy in 3 years (10/2022)     PSH:    Past  Surgical History:   Procedure Laterality Date    APPENDECTOMY      BENCH LIVER N/A 2016    Procedure: BENCH LIVER;  Surgeon: Enoc Crews MD;  Location: [facility] OR    BRONCHOSCOPY FLEXIBLE AND RIGID N/A 2022    Procedure: BRONCHOSCOPY;  Surgeon: Alena Valenzuela MD;  Location:  GI    COLONOSCOPY  2013    repeat in 2018    COLONOSCOPY N/A 10/04/2019    Procedure: COLONOSCOPY, WITH POLYPECTOMY AND BIOPSY;  Surgeon: Go Chong MD;  Location: [facility] OR    CREATE FISTULA ARTERIOVENOUS UPPER EXTREMITY Left 2023    Procedure: LEFT UPPER EXTREMITY ARTERIOVENOUS FISTULA CREATION. LIGATION OF COMPETING VASCULAR BRANCHES- LEFT;  Surgeon: Deejay Mcneil MD;  Location:  OR    CV PERICARDIOCENTESIS N/A 2023    Procedure: Pericardiocentesis;  Surgeon: Barry Mckeon MD;  Location:  HEART CARDIAC CATH LAB    HERNIA REPAIR      IR CHEST TUBE PLACEMENT NON-TUNNELED RIGHT  2022    IR CVC TUNNEL PLACEMENT > 5 YRS OF AGE  2022    IR DIALYSIS FISTULOGRAM LEFT  2023    IR GASTROSTOMY TUBE CHANGE  2023    IR GASTROSTOMY TUBE PERCUTANEOUS PLCMNT  2022    IR PARACENTESIS  2022    IR PARACENTESIS  2022    IR THORACENTESIS  2022    IR THORACENTESIS  2020    IR THORACENTESIS  08/10/2020    IR TRANSCATHETER BIOPSY  2022    REVISION FISTULA ARTERIOVENOUS UPPER EXTREMITY Left 8/15/2023    Procedure: 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 2; OUTFLOW REVISION CEPHALIC TO BRACHIAL VEIN;  Surgeon: Deejay Mcneil MD;  Location:  OR    TRACHEOSTOMY N/A 2022    Procedure: TRACHEOSTOMY;  Surgeon: Nilesh Jackson MD;  Location:  OR    TRANSPLANT LIVER RECIPIENT  DONOR N/A 2016    Procedure: TRANSPLANT LIVER RECIPIENT  DONOR;  Surgeon: Enoc Crews MD;  Location: [facility] OR     MEDICATIONS:     gabapentin  100 mg Oral Daily    Lacosamide  100 mg Oral QPM    [START ON  10/6/2023] lacosamide  50 mg Oral QAM    multivitamin RENAL  1 capsule Oral Daily    [START ON 10/6/2023] pantoprazole  40 mg Oral QAM AC    piperacillin-tazobactam  2.25 g Intravenous Q6H    sevelamer carbonate  800 mg Oral 4x Daily    tacrolimus  1 mg Oral Q12H     ALLERGIES:    Allergies as of 10/05/2023    (No Known Allergies)     FH:    Family History   Problem Relation Age of Onset    Coronary Artery Disease No family hx of     Cardiomyopathy No family hx of      SH:    Social History     Socioeconomic History    Marital status:      Spouse name: Not on file    Number of children: Not on file    Years of education: Not on file    Highest education level: Not on file   Occupational History    Not on file   Tobacco Use    Smoking status: Never    Smokeless tobacco: Never   Vaping Use    Vaping Use: Never used   Substance and Sexual Activity    Alcohol use: Not Currently     Alcohol/week: 0.0 standard drinks of alcohol    Drug use: No    Sexual activity: Not Currently   Other Topics Concern    Parent/sibling w/ CABG, MI or angioplasty before 65F 55M? Not Asked   Social History Narrative    Not on file     Social Determinants of Health     Financial Resource Strain: Not on file   Food Insecurity: Not on file   Transportation Needs: Not on file   Physical Activity: Not on file   Stress: Not on file   Social Connections: Not on file   Interpersonal Safety: Low Risk  (9/21/2023)    Interpersonal Safety     Do you feel physically and emotionally safe where you currently live?: Yes     Within the past 12 months, have you been hit, slapped, kicked or otherwise physically hurt by someone?: No     Within the past 12 months, have you been humiliated or emotionally abused in other ways by your partner or ex-partner?: No   Housing Stability: Not on file     PHYSICAL EXAM:    /66 (BP Location: Right arm)   Pulse 54   Temp 98  F (36.7  C) (Oral)   Resp 11   Ht 1.829 m (6')   Wt 93.2 kg (205 lb 7.5 oz)   SpO2  94%   BMI 27.87 kg/m      GENERAL: awake, alert, NAD  HEENT:  normocephalic, no gross abnormalities  CV: RRR, nl S1/S2; no ble edema  RESP: CTA B c good efforts  GI: abd o/s/nt/nd, + abd scars; no pain at tx site  SKIN: no suspicious lesions or rashes, dry to touch  NEURO:  strength normal and symmetric  PSYCH: mood good, affect appropriate  LYMPH: no palpable ant/post cervical and supraclavicular adenopathy  LINES: + R fem art/central line  OTHER:  Access - RIJ TDC, LAF c good thrill/bruit    LABS:      CBC RESULTS:     Recent Labs   Lab 10/05/23  0251 10/05/23  0245 10/03/23  1638 10/01/23  0631 09/29/23  0552   WBC  --  9.9 4.8 4.2 2.8*   RBC  --  3.68* 3.16* 3.08* 2.99*   HGB 12.6* 11.4* 9.8* 9.6* 9.3*   HCT 37* 35.2* 30.7* 30.2* 28.9*   PLT  --  146* 93* 71* 77*     BMP RESULTS:  Recent Labs   Lab 10/05/23  1611 10/05/23  0251 10/05/23  0245 10/03/23  1638 10/01/23  0631 09/29/23  0552   NA  --  135 136 137 134* 138   POTASSIUM  --  3.6 3.8 4.5 4.9 4.5   CHLORIDE  --   --  90* 94* 91* 97*   CO2  --   --  24 24 27 22   BUN  --   --  44.5* 73.8* 68.9* 87.9*   CR  --   --  5.77* 8.64* 8.41* 8.87*   *  --  127* 113* 106* 92   KELLY  --   --  9.4 9.0 9.2 9.4     INR  Recent Labs   Lab 10/05/23  0245   INR 1.55*      DIAGNOSTICS:  Personally reviewed - CXR: cardiomegaly, B effusions    A/P:  Blanco Osborne is a 58 year old male c ESKD who has B pneumonia, PE and pericardial effusions.    1.  ESKD.  Pt is on a MWF schedule and is due to run tmrw.  He is having a LAF fistula US now and it should be ready to use based on my exam. D/w Dr. Mcneil who will look at the US later.    A.  HD tmrw c 17g needles.  If there are any issues, then we can either do 1:1 c the catheter or just the catheter exclusively.    B.  Will only take 1L tmrw and see how he does clinically.  C.  Midodrine ordered pre/post.    2.  Hypotension / shock.  Pt is on low dose pressors still.  He tends to run on the low side and uses midodrine on  HD days.  He does have pneumonia, but this doesn't seem like overt sepsis.    A.  Wean pressors off.  B.  Consider scheduling midodrine 10 mg tid.  C.  Continue broad abx at proper renal doses.    3.  PE.  Pt is on a heparin gtt.  A.  Follow clinically.    4.  Pericardial effusion.  Cards is involved and is monitoring the pt.  At present, no plan to tap it given location.  May need a pericardial window.  A.  Per Cards.    5.  FEN.  Electrolytes are ok.  A.  Reg diet is ok since K is fine.    Thank you for this consultation. We will follow c you.  Please call if any questions.      Attestation:   I have reviewed today's relevant vital signs, notes, medications, labs and imaging.    Thierry Reddy MD  [facility] - Nephrology  [phone]    Consent 3/Introductory Paragraph: I gave the patient a chance to ask questions they had about the procedure.  Following this I explained the Mohs procedure and consent was obtained. The risks, benefits and alternatives to therapy were discussed in detail. Specifically, the risks of infection, scarring, bleeding, prolonged wound healing, incomplete removal, allergy to anesthesia, nerve injury and recurrence were addressed. Prior to the procedure, the treatment site was clearly identified and confirmed by the patient. All components of Universal Protocol/PAUSE Rule completed.

## 2023-10-05 NOTE — PROGRESS NOTES
VASCULAR SURGERY    Blanco Osborne has been admitted to ICU for progressive shortness of breath.  No DVT by ultrasound.    End-stage renal disease dialyzing via right jugular catheter.      8/15/2023 outflow revision approximately shoulder region cephalic vein to brachial vein and ligation of sidebranch and repair of pseudoaneurysms of left upper arm fistula.    He was seen in the clinic on 9/21/2023 with an excellent pulse in the fistula and good wound healing.  Mild narrow area in the mid fistula in the upper arm of no clinical concern otherwise widely patent.    They have been able to use the fistula only once at his outpatient dialysis unit and another attempt did have extravasation closer to the shoulder region.  Still using right IJ tunneled catheter.    Asked to evaluate the patient by Dr. Reddy this evening.  Fistula duplex: Pending.    Exam: Alert and appropriate.  Mild shortness of breath on oxygen.   Strong soft pulse and fistula.  Very easily and palpating immediately subcutaneous    With a very thin arm.   Resolving mild ecchymosis and upper arm axillary region with no compression on fistula      Impression: Clinically the fistula looks fine.  Good diameter and strong pulse.  We will evaluate fistula duplex which has not yet been completed.  I would suspect the fistula should function well.    Deejay Mcneil MD

## 2023-10-05 NOTE — ED NOTES
Emergent cardioversion.  Unstable atrial fib.    100 J.    Irregular rhythm change to Atrial fib & with V runs.   Then rhythm change to atrial fib rate with intermittent V runs.   Cardioversion again with 200 J.

## 2023-10-05 NOTE — PROGRESS NOTES
Physician Attestation   I, Laura Fernandes MD, was present with the medical/HARRY student who participated in the service and in the documentation of the note.  I have verified the history and personally performed the physical exam and medical decision making.  I agree with the assessment and plan of care as documented in the note.      Key findings: Very complicated patient with ESRD and prior liver transplant as well as recent hospitalization to drain a pericardial effusion who presented to the ED in shock this morning.  Blood pressure improved when converted from tachyarrhythmia to normal sinus rhythm.  At this point other than bilateral infiltrates of unknown chronicity we have no specific signs of sepsis.  Had finding of PE.  Discussed with radiologist who feel this is likely acute but very small and unlikely to be of clinical significance.  Upper and lower extremity Dopplers did not find DVTs although unable to visualize his dialysis access or the right lower extremity completely.  Large pericardial effusion but not thought to be because of his hemodynamic compromise and technically would be very difficult to drain.    Please see A&P for additional details of medical decision making.  -Continue antibiotics while we await cultures given his immunocompromised status.  -Continue on higher intensity anticoagulation for PE for now.  We will make a plan for duration more intense anticoagulation in the coming days.  -Dialysis per schedule.  Nephrologist will discuss fluid balance and address his concerns about dry weight.  -Avoid opioids or other agents which may worsen confusion  -Goal blood pressure of 95.  Wean vasopressors as possible.  -Repeat x-ray in the morning.  -Continue amiodarone.  I would guess that he would be at high risk of having more episodes of A-fib with his new pericardial effusion.  Appreciate cardiologist comanagement not to determine longer-term plan for this.      Critical Care Time:  90 minutes    Laura Fernandes MD  Date of Service (when I saw the patient): 10/05/23

## 2023-10-06 ENCOUNTER — APPOINTMENT (OUTPATIENT)
Dept: GENERAL RADIOLOGY | Facility: CLINIC | Age: 58
DRG: 871 | End: 2023-10-06
Attending: INTERNAL MEDICINE
Payer: COMMERCIAL

## 2023-10-06 LAB
ALBUMIN BODY FLUID SOURCE: NORMAL
ALBUMIN FLD-MCNC: 1.6 G/DL
ALBUMIN SERPL BCG-MCNC: 3.3 G/DL (ref 3.5–5.2)
ALP SERPL-CCNC: 213 U/L (ref 40–129)
ALT SERPL W P-5'-P-CCNC: 10 U/L (ref 0–70)
ANION GAP SERPL CALCULATED.3IONS-SCNC: 17 MMOL/L (ref 7–15)
APPEARANCE FLD: ABNORMAL
APTT PPP: 73 SECONDS (ref 22–38)
AST SERPL W P-5'-P-CCNC: 12 U/L (ref 0–45)
BILIRUB DIRECT SERPL-MCNC: 0.34 MG/DL (ref 0–0.3)
BILIRUB SERPL-MCNC: 0.6 MG/DL
BUN SERPL-MCNC: 60.5 MG/DL (ref 8–23)
CALCIUM SERPL-MCNC: 8.9 MG/DL (ref 8.6–10)
CELL COUNT BODY FLUID SOURCE: ABNORMAL
CHLORIDE SERPL-SCNC: 94 MMOL/L (ref 98–107)
COLOR FLD: ABNORMAL
CREAT SERPL-MCNC: 7.03 MG/DL (ref 0.67–1.17)
DEPRECATED HCO3 PLAS-SCNC: 23 MMOL/L (ref 22–29)
EGFRCR SERPLBLD CKD-EPI 2021: 8 ML/MIN/1.73M2
ERYTHROCYTE [DISTWIDTH] IN BLOOD BY AUTOMATED COUNT: 14.6 % (ref 10–15)
GLUCOSE BLDC GLUCOMTR-MCNC: 139 MG/DL (ref 70–99)
GLUCOSE BLDC GLUCOMTR-MCNC: 145 MG/DL (ref 70–99)
GLUCOSE BODY FLUID SOURCE: NORMAL
GLUCOSE FLD-MCNC: 174 MG/DL
GLUCOSE SERPL-MCNC: 152 MG/DL (ref 70–99)
HCT VFR BLD AUTO: 29.2 % (ref 40–53)
HGB BLD-MCNC: 9.5 G/DL (ref 13.3–17.7)
LD BODY BODY FLUID SOURCE: NORMAL
LDH FLD L TO P-CCNC: 51 U/L
MCH RBC QN AUTO: 30.5 PG (ref 26.5–33)
MCHC RBC AUTO-ENTMCNC: 32.5 G/DL (ref 31.5–36.5)
MCV RBC AUTO: 94 FL (ref 78–100)
PATH REV: ABNORMAL
PHOSPHATE SERPL-MCNC: 6.6 MG/DL (ref 2.5–4.5)
PHOSPHATE SERPL-MCNC: 6.7 MG/DL (ref 2.5–4.5)
PLATELET # BLD AUTO: 117 10E3/UL (ref 150–450)
POTASSIUM SERPL-SCNC: 4.6 MMOL/L (ref 3.4–5.3)
PROT FLD-MCNC: 3 G/DL
PROT SERPL-MCNC: 6.6 G/DL (ref 6.4–8.3)
PROTEIN BODY FLUID SOURCE: NORMAL
RBC # BLD AUTO: 3.11 10E6/UL (ref 4.4–5.9)
SODIUM SERPL-SCNC: 134 MMOL/L (ref 135–145)
WBC # BLD AUTO: 6.6 10E3/UL (ref 4–11)
WBC # FLD AUTO: 1119 /UL

## 2023-10-06 PROCEDURE — 90937 HEMODIALYSIS REPEATED EVAL: CPT

## 2023-10-06 PROCEDURE — 258N000003 HC RX IP 258 OP 636: Performed by: INTERNAL MEDICINE

## 2023-10-06 PROCEDURE — 84157 ASSAY OF PROTEIN OTHER: CPT | Performed by: INTERNAL MEDICINE

## 2023-10-06 PROCEDURE — 89050 BODY FLUID CELL COUNT: CPT | Performed by: INTERNAL MEDICINE

## 2023-10-06 PROCEDURE — 250N000011 HC RX IP 250 OP 636: Mod: JZ | Performed by: INTERNAL MEDICINE

## 2023-10-06 PROCEDURE — 85730 THROMBOPLASTIN TIME PARTIAL: CPT | Performed by: INTERNAL MEDICINE

## 2023-10-06 PROCEDURE — 87075 CULTR BACTERIA EXCEPT BLOOD: CPT | Performed by: INTERNAL MEDICINE

## 2023-10-06 PROCEDURE — 0W9G3ZZ DRAINAGE OF PERITONEAL CAVITY, PERCUTANEOUS APPROACH: ICD-10-PCS | Performed by: INTERNAL MEDICINE

## 2023-10-06 PROCEDURE — 49083 ABD PARACENTESIS W/IMAGING: CPT | Performed by: INTERNAL MEDICINE

## 2023-10-06 PROCEDURE — 84100 ASSAY OF PHOSPHORUS: CPT | Performed by: INTERNAL MEDICINE

## 2023-10-06 PROCEDURE — 84478 ASSAY OF TRIGLYCERIDES: CPT | Performed by: INTERNAL MEDICINE

## 2023-10-06 PROCEDURE — 87070 CULTURE OTHR SPECIMN AEROBIC: CPT | Performed by: INTERNAL MEDICINE

## 2023-10-06 PROCEDURE — 250N000013 HC RX MED GY IP 250 OP 250 PS 637: Performed by: INTERNAL MEDICINE

## 2023-10-06 PROCEDURE — 83615 LACTATE (LD) (LDH) ENZYME: CPT | Performed by: INTERNAL MEDICINE

## 2023-10-06 PROCEDURE — 200N000001 HC R&B ICU

## 2023-10-06 PROCEDURE — 80053 COMPREHEN METABOLIC PANEL: CPT | Performed by: INTERNAL MEDICINE

## 2023-10-06 PROCEDURE — 250N000009 HC RX 250: Performed by: INTERNAL MEDICINE

## 2023-10-06 PROCEDURE — 250N000012 HC RX MED GY IP 250 OP 636 PS 637: Performed by: INTERNAL MEDICINE

## 2023-10-06 PROCEDURE — 88305 TISSUE EXAM BY PATHOLOGIST: CPT | Mod: TC | Performed by: INTERNAL MEDICINE

## 2023-10-06 PROCEDURE — P9045 ALBUMIN (HUMAN), 5%, 250 ML: HCPCS | Mod: JZ | Performed by: INTERNAL MEDICINE

## 2023-10-06 PROCEDURE — 5A1D70Z PERFORMANCE OF URINARY FILTRATION, INTERMITTENT, LESS THAN 6 HOURS PER DAY: ICD-10-PCS | Performed by: INTERNAL MEDICINE

## 2023-10-06 PROCEDURE — 99232 SBSQ HOSP IP/OBS MODERATE 35: CPT | Performed by: INTERNAL MEDICINE

## 2023-10-06 PROCEDURE — 87102 FUNGUS ISOLATION CULTURE: CPT | Performed by: INTERNAL MEDICINE

## 2023-10-06 PROCEDURE — 250N000011 HC RX IP 250 OP 636: Performed by: INTERNAL MEDICINE

## 2023-10-06 PROCEDURE — 99291 CRITICAL CARE FIRST HOUR: CPT | Mod: 25 | Performed by: INTERNAL MEDICINE

## 2023-10-06 PROCEDURE — 82945 GLUCOSE OTHER FLUID: CPT | Performed by: INTERNAL MEDICINE

## 2023-10-06 PROCEDURE — 82248 BILIRUBIN DIRECT: CPT | Performed by: INTERNAL MEDICINE

## 2023-10-06 PROCEDURE — 85027 COMPLETE CBC AUTOMATED: CPT | Performed by: INTERNAL MEDICINE

## 2023-10-06 PROCEDURE — 82042 OTHER SOURCE ALBUMIN QUAN EA: CPT | Performed by: INTERNAL MEDICINE

## 2023-10-06 PROCEDURE — 99233 SBSQ HOSP IP/OBS HIGH 50: CPT | Performed by: INTERNAL MEDICINE

## 2023-10-06 PROCEDURE — 71045 X-RAY EXAM CHEST 1 VIEW: CPT

## 2023-10-06 PROCEDURE — 87205 SMEAR GRAM STAIN: CPT | Performed by: INTERNAL MEDICINE

## 2023-10-06 PROCEDURE — 250N000011 HC RX IP 250 OP 636

## 2023-10-06 RX ORDER — HYDROMORPHONE HCL IN WATER/PF 6 MG/30 ML
0.2 PATIENT CONTROLLED ANALGESIA SYRINGE INTRAVENOUS EVERY 6 HOURS PRN
Status: DISCONTINUED | OUTPATIENT
Start: 2023-10-06 | End: 2023-10-15

## 2023-10-06 RX ORDER — MIDODRINE HYDROCHLORIDE 10 MG/1
10 TABLET ORAL
Status: DISCONTINUED | OUTPATIENT
Start: 2023-10-06 | End: 2023-10-07

## 2023-10-06 RX ORDER — NALOXONE HYDROCHLORIDE 0.4 MG/ML
0.2 INJECTION, SOLUTION INTRAMUSCULAR; INTRAVENOUS; SUBCUTANEOUS
Status: DISCONTINUED | OUTPATIENT
Start: 2023-10-06 | End: 2023-10-23 | Stop reason: HOSPADM

## 2023-10-06 RX ORDER — LIDOCAINE 40 MG/G
CREAM TOPICAL
Status: DISCONTINUED | OUTPATIENT
Start: 2023-10-06 | End: 2023-10-08

## 2023-10-06 RX ORDER — FENTANYL CITRATE 50 UG/ML
200 INJECTION, SOLUTION INTRAMUSCULAR; INTRAVENOUS ONCE
Status: COMPLETED | OUTPATIENT
Start: 2023-10-06 | End: 2023-10-06

## 2023-10-06 RX ORDER — GABAPENTIN 100 MG/1
100 CAPSULE ORAL AT BEDTIME
Status: CANCELLED | OUTPATIENT
Start: 2023-10-06

## 2023-10-06 RX ORDER — GABAPENTIN 100 MG/1
100 CAPSULE ORAL DAILY
Status: DISCONTINUED | OUTPATIENT
Start: 2023-10-07 | End: 2023-10-07

## 2023-10-06 RX ORDER — RAMELTEON 8 MG/1
8 TABLET ORAL ONCE
Status: COMPLETED | OUTPATIENT
Start: 2023-10-06 | End: 2023-10-06

## 2023-10-06 RX ORDER — COLCHICINE 0.6 MG/1
0.6 TABLET ORAL
Status: CANCELLED | OUTPATIENT
Start: 2023-10-07

## 2023-10-06 RX ORDER — MIDODRINE HYDROCHLORIDE 10 MG/1
10 TABLET ORAL
Status: COMPLETED | OUTPATIENT
Start: 2023-10-06 | End: 2023-10-06

## 2023-10-06 RX ORDER — FENTANYL CITRATE 50 UG/ML
INJECTION, SOLUTION INTRAMUSCULAR; INTRAVENOUS
Status: COMPLETED
Start: 2023-10-06 | End: 2023-10-06

## 2023-10-06 RX ORDER — NALOXONE HYDROCHLORIDE 0.4 MG/ML
0.4 INJECTION, SOLUTION INTRAMUSCULAR; INTRAVENOUS; SUBCUTANEOUS
Status: DISCONTINUED | OUTPATIENT
Start: 2023-10-06 | End: 2023-10-23 | Stop reason: HOSPADM

## 2023-10-06 RX ORDER — ALBUMIN (HUMAN) 12.5 G/50ML
50 SOLUTION INTRAVENOUS
Status: DISCONTINUED | OUTPATIENT
Start: 2023-10-06 | End: 2023-10-06

## 2023-10-06 RX ORDER — AMIODARONE HYDROCHLORIDE 200 MG/1
200 TABLET ORAL 2 TIMES DAILY
Status: DISCONTINUED | OUTPATIENT
Start: 2023-10-06 | End: 2023-10-16

## 2023-10-06 RX ORDER — LANOLIN ALCOHOL/MO/W.PET/CERES
3 CREAM (GRAM) TOPICAL AT BEDTIME
Status: DISCONTINUED | OUTPATIENT
Start: 2023-10-06 | End: 2023-10-23 | Stop reason: HOSPADM

## 2023-10-06 RX ADMIN — ALBUMIN HUMAN 250 ML: 0.05 INJECTION, SOLUTION INTRAVENOUS at 13:02

## 2023-10-06 RX ADMIN — PIPERACILLIN AND TAZOBACTAM 2.25 G: 2; .25 INJECTION, POWDER, FOR SOLUTION INTRAVENOUS at 08:49

## 2023-10-06 RX ADMIN — PANTOPRAZOLE SODIUM 40 MG: 40 TABLET, DELAYED RELEASE ORAL at 08:49

## 2023-10-06 RX ADMIN — SODIUM CHLORIDE 250 ML: 9 INJECTION, SOLUTION INTRAVENOUS at 13:00

## 2023-10-06 RX ADMIN — HEPARIN SODIUM 1900 UNITS: 1000 INJECTION INTRAVENOUS; SUBCUTANEOUS at 15:18

## 2023-10-06 RX ADMIN — PIPERACILLIN AND TAZOBACTAM 2.25 G: 2; .25 INJECTION, POWDER, FOR SOLUTION INTRAVENOUS at 20:30

## 2023-10-06 RX ADMIN — ALBUMIN HUMAN 250 ML: 0.05 INJECTION, SOLUTION INTRAVENOUS at 12:40

## 2023-10-06 RX ADMIN — ACETAMINOPHEN 650 MG: 325 TABLET, FILM COATED ORAL at 13:48

## 2023-10-06 RX ADMIN — LACOSAMIDE 100 MG: 50 TABLET, FILM COATED ORAL at 20:51

## 2023-10-06 RX ADMIN — AMIODARONE HYDROCHLORIDE 200 MG: 200 TABLET ORAL at 20:57

## 2023-10-06 RX ADMIN — SEVELAMER CARBONATE 800 MG: 800 TABLET, FILM COATED ORAL at 20:51

## 2023-10-06 RX ADMIN — TACROLIMUS 1 MG: 1 CAPSULE ORAL at 20:52

## 2023-10-06 RX ADMIN — NOREPINEPHRINE BITARTRATE 0.04 MCG/KG/MIN: 0.02 INJECTION, SOLUTION INTRAVENOUS at 00:38

## 2023-10-06 RX ADMIN — ACETAMINOPHEN 650 MG: 325 TABLET, FILM COATED ORAL at 08:49

## 2023-10-06 RX ADMIN — AMIODARONE HYDROCHLORIDE 0.5 MG/MIN: 50 INJECTION, SOLUTION INTRAVENOUS at 01:53

## 2023-10-06 RX ADMIN — FENTANYL CITRATE 50 MCG: 50 INJECTION, SOLUTION INTRAMUSCULAR; INTRAVENOUS at 20:13

## 2023-10-06 RX ADMIN — SEVELAMER CARBONATE 800 MG: 800 TABLET, FILM COATED ORAL at 16:39

## 2023-10-06 RX ADMIN — ALBUMIN HUMAN 25 G: 0.05 INJECTION, SOLUTION INTRAVENOUS at 20:30

## 2023-10-06 RX ADMIN — GABAPENTIN 100 MG: 100 CAPSULE ORAL at 08:49

## 2023-10-06 RX ADMIN — RAMELTEON 8 MG: 8 TABLET ORAL at 20:57

## 2023-10-06 RX ADMIN — MIDODRINE HYDROCHLORIDE 10 MG: 10 TABLET ORAL at 13:02

## 2023-10-06 RX ADMIN — ACETAMINOPHEN 650 MG: 325 TABLET, FILM COATED ORAL at 02:05

## 2023-10-06 RX ADMIN — LACOSAMIDE 50 MG: 50 TABLET, FILM COATED ORAL at 08:49

## 2023-10-06 RX ADMIN — HYDROXYZINE HYDROCHLORIDE 25 MG: 25 TABLET, FILM COATED ORAL at 16:44

## 2023-10-06 RX ADMIN — MIDAZOLAM 2 MG: 1 INJECTION INTRAMUSCULAR; INTRAVENOUS at 20:14

## 2023-10-06 RX ADMIN — Medication 1 CAPSULE: at 08:49

## 2023-10-06 RX ADMIN — HEPARIN SODIUM 1900 UNITS: 1000 INJECTION INTRAVENOUS; SUBCUTANEOUS at 15:20

## 2023-10-06 RX ADMIN — PIPERACILLIN AND TAZOBACTAM 2.25 G: 2; .25 INJECTION, POWDER, FOR SOLUTION INTRAVENOUS at 02:06

## 2023-10-06 RX ADMIN — TACROLIMUS 1 MG: 1 CAPSULE ORAL at 08:49

## 2023-10-06 RX ADMIN — PIPERACILLIN AND TAZOBACTAM 2.25 G: 2; .25 INJECTION, POWDER, FOR SOLUTION INTRAVENOUS at 13:50

## 2023-10-06 RX ADMIN — HEPARIN SODIUM 1050 UNITS/HR: 10000 INJECTION, SOLUTION INTRAVENOUS at 07:29

## 2023-10-06 RX ADMIN — SEVELAMER CARBONATE 800 MG: 800 TABLET, FILM COATED ORAL at 08:49

## 2023-10-06 RX ADMIN — MIDODRINE HYDROCHLORIDE 10 MG: 10 TABLET ORAL at 11:40

## 2023-10-06 ASSESSMENT — ACTIVITIES OF DAILY LIVING (ADL)
ADLS_ACUITY_SCORE: 40
ADLS_ACUITY_SCORE: 43
ADLS_ACUITY_SCORE: 43
ADLS_ACUITY_SCORE: 40
ADLS_ACUITY_SCORE: 43
ADLS_ACUITY_SCORE: 40
ADLS_ACUITY_SCORE: 43
ADLS_ACUITY_SCORE: 43

## 2023-10-06 NOTE — PLAN OF CARE
Neuro:  A&Ox4, able to make needs known, follows commands, PERRLA, extremities strengths equal bilaterally.  CV:  SR shoulder pain, IV dilaudid given x 1, and PRN tylenol, Patient decline bath this shift, wants to sleep and rest.  Pulm: LS diminished bilaterally, reports some SOB with activity, on RA  GI/: BS active, tolerate regular diet well, anuric on HD, M W Fr  Skin: scattered scabbing scattered bruising  Lines: triple lumen R groin, R groin art line, PIV, and R Subclavian HD catheter  Drips: Levophed 0.04, amiodarone 0.5, heparin 1050  Additional: L arm restriction fistula  Labs: PTT 73, Goal 60-99  Plan for HD today.

## 2023-10-06 NOTE — PLAN OF CARE
Neuro: A&O. Intact,Forgetful. PERRL  Cardiac: SB. On Levo to maintain SBP > 90. On heparin and Amio gtt  Pulmonary: RA, LSC  GI/: Tolerate Regular diet Aneuric. Dialysis MWF  Skin: See chart  Activity: BR  Pain: Abd pain

## 2023-10-06 NOTE — PROGRESS NOTES
Critical Care  Note      10/06/2023    Name: Blanco Osborne MRN#: [id number]   Age: 58 year old YOB: 1965     Hsptl Day# 1  ICU DAY #    MV DAY #             Problem List:   Principal Problem:    Pulmonary infiltrates  Active Problems:    ESRD (end stage renal disease) on dialysis (H)    Pericardial effusion    Wide-complex tachycardia    Elevated troponin    Atrial fibrillation with rapid ventricular response (H)    Severe sepsis (H)    Multiple subsegmental pulmonary emboli without acute cor pulmonale (H)           Summary/Hospital Course:     Blanco Osborne is a 58 year old male with a complicated medical history, s/p liver transplant 2016, on chronic immunosuppression, pAF on chronic anticoagulation, ESRD on dialysis, with his last hospitalization ~6 mo in 4/2023, presented to the ED with chest discomfort and palpitations. Wife relays that he was diaphoretic and pale. He has not had similar symptoms in the past. He was recently admitted to [facility] (9/28) for a pericardial effusion and had this drained. During that time he was off of his apixaban for approximately 48 hours.   Upon arrival to ED was hypotensive and noted to have a wide complex tachycardia. Despite fluid resuscitation, remained hypotensive and with two attempts of unsuccessful emergent cardioversion. Subsequently amiodarone was initiated and after some time the patient did convert to normal sinus rhythm with improvement. He had elevated troponin however it is unclear if this is secondary to underlying acute coronary syndrome, demand ischemia from his tachycardia, or his end-stage renal disease. He has a known pericardial effusion, bedside echo demonstrates fluid therefore a formal echo was obtained with the read above. There is no signs of tamponade on the echo report. CT imaging of the chest abdomen pelvis demonstrated PE and pulmonary infiltrates and the patient was anticoagulated with heparin, and received  broad-spectrum antibiotic coverage for concern for sepsis. Patient responded nicely to judicious IV fluid resuscitation as well as administration of vasopressors.     Interval/overnight events:  Started on 2L NC O2 for desats to 80s, had received Dilaudid for pain. Able to breathe comfortably on RA by AM.       Assessment and plan :     Blanco Osborne IS a 58 year old male admitted on 10/5/2023 for hemodynamic instability.   I have personally reviewed the daily labs, imaging studies, cultures and discussed the case with referring physician and consulting physicians.     My assessment and plan by system for this patient is as follows:    Neurology/Psychiatry:   1. AxO3  2. Pain/analgesia  Noted that he should avoid opioids due to history of encephaloathy and confusion, even if patient requests.  Manage with Acetaminophen, PTA gabapentin 100mg  3. Sedation - not required at this time  4. Delirium - history of hospital delirium, hepatic encephalopathy  -Protocols: consider Delirium precautions    #History of Seizure  Per chart review was previously on Keppra and Lacosamide during hospitalization for seizures thought to be secondary to hypoxic events.  -Meds: PTA Lacosamide, per pharmacy levels not needed     #Itching #Anxiety #Restless Leg Syndrome  -PTA PRN Hydroxyzine, Ropinirole     #Delayed Sleep Onset  Difficulty onset with sleep in hospital setting.  -Meds: Ramelteon 8mg at bedtime      Cardiovascular:   Hemodynamics  #Shock, Cardiogenic - improving  #SVT with aberrancy #Paroxysmal Afib #History of Embolic Strokes  #Pericardial Effusion, Chronic  #Pulmonary Embolism  #Pulmonary Infiltrates  Decreasing pressor requirements with intermittent Levo at 0.01 mcg/kg/min to meet SysBP goals of >90mmg. On bedside ultrasound, pericardial effusion slightly increased with no evidence of tamponade. Likely worsening due to missed dialysis and concurrent anticoagulation. On interview, characterization of chest pain more  consistent with pericardial effusion over PE. No evidence of DVTs on doppler of all extremities. Per phone call with radiology, PE unlikely to cause burden large enough for significant RV strain. Likely worsening pericardial effusion and Afib caused acute presentation. Patient likely has low cardiac reserve from a worsening pericardial effusion, ESRD with incomplete dialysis, pulmonary hypertension/lung fibrosis from ~6mo hospitalization, that loss of atrial kick caused him to remain hypotensive and requiring pressors. Will continue with high-intensity heparin in the setting of pAF with a history of embolic strokes, and current PE. May consider IVC filter, though will elect for conservative management at this time due to complex medical history. Cardiology consulted and following, patient remains in sinus rhythm, will continue with amiodarone though may plan on continuing through hospitalization if effusion cannot be fully addressed due to contraindications. Low concern for sepsis given WBC, lactic acid WNL with pericardial effusion sample on 9/28 showing no growth to date, though due to history of prolonged hospital stay and immunosuppression will continue broad-spectrum abx while pending sputum/blood cx and inpatient.    -Pressors: 0.01 Levo mcg/kg/min  -Consults: Cardiology  -Infusions: Heparin high-intensity  -Abx: Vancomycin, Zosyn (end date TBD)  -Labs: Coags, CBC, CMP  -Meds: PRN Midodrine for dialysis, PO Amiodarone  200 mg BID  -Cultures: pericardial effusion 9/28 NGTD, pending blood, sputum    #Chest Pain 2/2 Pericardial Effusion  Per literature review, rare cases of patients on DOAC with recurrent spontaneous hemopericardial effusions and  thromboembolism were treated supportively with varying doses of colchicine -- presenting patients had significant polypharmacy with concurrent liver/renal dysfunction. Per pharmacy, currently no present hospital guideline for renal dosing of colchicine for pericarditis.  Will start conservatively and increase if efficacious and well tolerated.  -Meds: Colchicine 0.6mg Q48H    Pulmonary/Ventilator Management:   1. Oxygenation/ventilation/mechanics  #Sating 90s on room air  No evidence of acidosis/alkalosis.      #Pulmonary Embolism  Per radiology, PE is likely smaller than stated due to motion artifact and unlikely to cause burden large enough for significant RV strain. Given possibility of an incidental PE in the setting of worsening and recurrent pericardial effusion, will continue with thoughtful and conservative management of anticoagulant to not further exacerbate effusion to require centesis, while addressing acute issues contributing to presentation. Doppler in all extremities largely unremarkable for DVT, though unable to assess port site but overall findings suggestive of incidental PE finding.   -Infusions: High Intensity Heparin  -Consults: considering Heme Consult     #Pulmonary Infiltrates, concerning for PNA  Will treat empirically in the setting of chronic immunosuppression, ESRD, and prolonged hospital course in 4/2023 (treated for ARDS, b/l pneumothoraces, hydroPTX, fungal PNA). As stated above.   -Abx: Vancomycin, Zosyn (end date TBD)  -Imaging: Chest XR     #Sleep Apnea  #Pulmonary Hypertension  Patient declines BiPap, says he does not use at home.      GI and Nutrition :   #S/p liver transplant 2016   #Chronic immunosuppression, on tacrolimus  Alk Phos elevated to 252, baseline in 200's. Appears well compensated based on labs, per chart review thought to have been able to regenerate after cirrhosis last year. On chronic immunosuppression with Tacrolimus 1mg BID. Historically has been continued in the setting of sepsis/infection.   -Meds: Tacrolimus 1mg BID, to follow up with levels s/p 2-3 doses.     #Renal Diet    #PPI PPX     Renal/Fluids/Electrolytes:   #ESRD on dialysis (MWF)  Last dialysis on 10/4 Wednesday, will continue in hospital. Nephrology consulted,  will appreciate recommendations. BUN/Cr consistent with ESRD. Replete lytes as necessary. Likely contributory to troponemia. Nephrology consulted.  -Labs: Trend CMP, Phos  -Meds: PTA Sevelamer QID, can skip last dose, Midodrine for hypotension during dialysis  -Consults: Nephrology - Continue dialysis MWF  -Protocols: replete lytes as necessary     Infectious Disease:   #Pulmonary Infiltrates  #Chronic Immunosuppression  As stated above.     Endocrine:   #Stress induced hyperglycemia  A1c 8/23 4.7. Did not require IV insulin during last hospitalization (4/23). Will discontinue if needed.   -Hyperglycemia Protocol     Hematology/Oncology:    #Anemia, secondary to ESRD, chronic disease. No signs, symptoms of active blood loss  Baseline hgb 7-8, per chart review possible Epo resistance.   -Labs: CBC     #Hypercoaguability  #History of embolic strokes  As stated above.     IV/Access:   1. Venous access - Central Line, PIV  2. Arterial access - A line, right femoral    ICU Prophylaxis:   1. DVT: Hep Subq/ LMWH/mechanical  2. VAP: HOB 30 degrees, chlorhexidine rinse  3. Stress Ulcer: PPI  4. Restraints: Nonviolent soft two point restraints required and necessary for patient safety and continued cares and good effect as patient continues to pull at necessary lines, tubes despite education and distraction. Will readdress daily.   5. Wound care  - n/a  6. Feeding - Renal Diet  7. Family Update:Wife Ofe  8. Disposition - ICU     Clinically Significant Risk Factors             # Anion Gap Metabolic Acidosis: Highest Anion Gap = 22 mmol/L in last 2 days, will monitor and treat as appropriate  # Hypoalbuminemia: Lowest albumin = 3.3 g/dL at 10/6/2023  5:33 AM, will monitor as appropriate  # Coagulation Defect: INR = 1.55 (Ref range: 0.85 - 1.15) and/or PTT = 73 Seconds (Ref range: 22 - 38 Seconds), will monitor for bleeding  # Thrombocytopenia: Lowest platelets = 117 in last 2 days, will monitor for bleeding   # Hypertension:  Noted on problem list        # Overweight: Estimated body mass index is 27.87 kg/m  as calculated from the following:    Height as of this encounter: 1.829 m (6').    Weight as of this encounter: 93.2 kg (205 lb 7.5 oz)., PRESENT ON ADMISSION                                Key Medications:      albumin human  250 mL Intravenous Once in dialysis/CRRT    gabapentin  100 mg Oral Daily    sodium chloride (PF) 0.9%  10 mL Intracatheter Once in dialysis/CRRT    Followed by    heparin  1.3-2.6 mL Intracatheter Once in dialysis/CRRT    sodium chloride (PF) 0.9%  10 mL Intracatheter Once in dialysis/CRRT    Followed by    heparin  1.3-2.6 mL Intracatheter Once in dialysis/CRRT    Lacosamide  100 mg Oral QPM    lacosamide  50 mg Oral QAM    multivitamin RENAL  1 capsule Oral Daily    - MEDICATION INSTRUCTIONS -   Does not apply Once    - MEDICATION INSTRUCTIONS -   Does not apply Once    pantoprazole  40 mg Oral QAM AC    piperacillin-tazobactam  2.25 g Intravenous Q6H    sevelamer carbonate  800 mg Oral 4x Daily    sodium chloride (PF)  9 mL Intracatheter During Dialysis/CRRT (from stock)    sodium chloride (PF)  9 mL Intracatheter During Dialysis/CRRT (from stock)    sodium chloride 0.9%  250 mL Intravenous Once in dialysis/CRRT    sodium chloride 0.9%  300 mL Hemodialysis Machine Once    sodium chloride 0.9%  300 mL Hemodialysis Machine Once    tacrolimus  1 mg Oral Q12H      amiodarone 0.5 mg/min (10/06/23 0800)    heparin 1,050 Units/hr (10/06/23 0800)    norepinephrine 0.02 mcg/kg/min (10/06/23 0839)    - MEDICATION INSTRUCTIONS -      - MEDICATION INSTRUCTIONS -                Physical Examination:   Temp:  [98  F (36.7  C)-98.7  F (37.1  C)] 98  F (36.7  C)  Pulse:  [51-71] 71  Resp:  [7-35] 15  MAP:  [57 mmHg-89 mmHg] 65 mmHg  Arterial Line BP: ()/(41-70) 89/53  SpO2:  [89 %-98 %] 96 %    Intake/Output Summary (Last 24 hours) at 10/6/2023 0900  Last data filed at 10/6/2023 0800  Gross per 24 hour   Intake  1770.53 ml   Output --   Net 1770.53 ml     Wt Readings from Last 4 Encounters:   10/05/23 93.2 kg (205 lb 7.5 oz)   10/03/23 86.2 kg (190 lb)   10/01/23 87.9 kg (193 lb 11.2 oz)   09/21/23 88.5 kg (195 lb)     Arterial Line BP: ()/(41-70) 89/53  MAP:  [57 mmHg-89 mmHg] 65 mmHg  Resp: 15    No lab results found in last 7 days.    GEN: no acute distress   HEENT: head ncat, sclera anicteric, OP patent, trachea midline   PULM: unlabored synchronous with vent, clear anteriorly    CV/COR: RRR S1S2 no gallop,  No rub, no murmur  ABD: slightly tender at RUQ, hypoactive bowel sounds, no mass  EXT:  warm and well perfused x4  NEURO: PERRL, no obvious deficits  SKIN: no obvious rash  LINES: clean, dry intact         Data:   All data and imaging reviewed     ROUTINE ICU LABS (Last four results)  CMP  Recent Labs   Lab 10/06/23  0533 10/05/23  2047 10/05/23  1611 10/05/23  0251 10/05/23  0245 10/03/23  1638 10/01/23  0631   *  --   --  135 136 137 134*   POTASSIUM 4.6  --   --  3.6 3.8 4.5 4.9   CHLORIDE 94*  --   --   --  90* 94* 91*   CO2 23  --   --   --  24 24 27   ANIONGAP 17*  --   --   --  22* 19* 16*   * 132* 178*  --  127* 113* 106*   BUN 60.5*  --   --   --  44.5* 73.8* 68.9*   CR 7.03*  --   --   --  5.77* 8.64* 8.41*   GFRESTIMATED 8*  --   --   --  11* 7* 7*   KELLY 8.9  --   --   --  9.4 9.0 9.2   MAG  --   --   --   --  2.1  --   --    PHOS 6.7*  --   --   --  5.2*  --   --    PROTTOTAL 6.6  --   --   --  7.6  --   --    ALBUMIN 3.3*  --   --   --  3.9  --   --    BILITOTAL 0.6  --   --   --  1.3*  --   --    ALKPHOS 213*  --   --   --  252*  --   --    AST 12  --   --   --  17  --   --    ALT 10  --   --   --  13  --   --      CBC  Recent Labs   Lab 10/06/23  0533 10/05/23  0251 10/05/23  0245 10/03/23  1638 10/01/23  0631   WBC 6.6  --  9.9 4.8 4.2   RBC 3.11*  --  3.68* 3.16* 3.08*   HGB 9.5* 12.6* 11.4* 9.8* 9.6*   HCT 29.2* 37* 35.2* 30.7* 30.2*   MCV 94  --  96 97 98   MCH 30.5  --  31.0  31.0 31.2   MCHC 32.5  --  32.4 31.9 31.8   RDW 14.6  --  14.3 14.6 14.6   *  --  146* 93* 71*     INR  Recent Labs   Lab 10/05/23  0245   INR 1.55*     Arterial Blood GasNo lab results found in last 7 days.    All cultures:  No results for input(s): CULT in the last 168 hours.  Recent Results (from the past 24 hour(s))   US Lower Extremity Venous Duplex Bilateral    Narrative    [facility]  DATE: 10/5/2023    EXAM: BILATERAL LOWER EXTREMITY VENOUS ULTRASOUND    INDICATION: Recent diagnosis PE on anticoagulation.    TECHNIQUE: Duplex imaging was performed utilizing gray-scale,  compression, augmentation as appropriate, color-flow, Doppler waveform  analysis, and spectral Doppler imaging.    COMPARISON: None..    FINDINGS:     RIGHT LOWER EXTREMITY:   The right common femoral and great saphenous veins are unable to be  assessed due to arterial line in place. The right profunda femoral,  femoral, popliteal, and segmentally visualized calf veins are patent  and compressible with appropriate vascular waveforms. No deep venous  thrombus is identified.    LEFT LOWER EXTREMITY:   The left common femoral, profunda femoral, femoral, popliteal, and  segmentally visualized calf veins are patent and compressible with  appropriate vascular waveforms. No deep venous thrombus is identified.  The great saphenous vein is patent.      Impression    IMPRESSION:   The right CFV and GSV are unable to be assessed due to the existing  arterial line. Exam is otherwise negative for deep vein thrombosis  bilaterally.    MARY JO FAN MD         SYSTEM ID:  [id number]   US Upper Extremity Venous Duplex Right    Narrative    [facility]  DATE: 10/5/2023    EXAM: RIGHT UPPER EXTREMITY VENOUS ULTRASOUND    INDICATION: Recently diagnosed PE on heparin.     TECHNIQUE: Duplex imaging was performed utilizing gray-scale,  compression, augmentation as appropriate, color-flow, and spectral  Doppler imaging with Doppler waveform  analysis.     COMPARISON: None.    FINDINGS:  RIGHT ARM:  The right internal jugular vein is patent and  compressible. The visualized innominate and subclavian veins are  patent with normal phasic waveforms. The right axillary, basilic, and  brachial veins are patent and compressible. Short segment thrombosis  in the right cephalic vein at the antecubital fossa. The right chest  tunnel line is not well evaluated due to acoustic shadowing.      Impression    IMPRESSION:   1. Negative for right upper extremity DVT.  2. Short segment DVT in the right cephalic vein at the antecubital  fossa, which may be related to prior intravenous access.    MARY JO FAN MD         SYSTEM ID:  [id number]   US Upper Extremity Venous Duplex Left    Narrative    VENOUS DUPLEX ULTRASOUND LEFT UPPER EXTREMITY  10/5/2023 2:57 PM     HISTORY: Patient with bilateral PEs, request made for evaluation of  DVT.    COMPARISON: None.    TECHNIQUE: Color Doppler and spectral waveform analysis performed  throughout the deep veins of the left upper extremity. Patient also  has an AV fistula, therefore color Doppler and spectral waveform  analysis obtained in the inflow brachial artery as well as outflow  fistulized vein.    FINDINGS: The left internal jugular, subclavian, axillary, and  brachial veins demonstrate normal blood flow, compression (where  applicable), and augmentation. Radial and ulnar veins are  compressible. Basilic vein is patent. Total blood flow volume is 1920  mL/min. Shortly beyond the mid to upper portion of the upper arm,  there is an area of narrowing measuring 2.6 mm with velocity of  515/301 cm/sec. More central segments are 4.9-8.8 mm. An area of  irregularity is noted in the upper arm, perhaps a small  pseudoaneurysmal segment measuring 7 x 2 mm.      Impression    IMPRESSION:  1. Negative for DVT in the left upper extremity.  2. Patent AV fistula with good blood flow volume of 1920 mL/min.  Moderate stenosis in the mid to  upper outflow cephalic vein with  diameter reduced to 2.6 mm.    ABEBA SIMONS MD         SYSTEM ID:  [id number]   XR Chest Port 1 View    Narrative    EXAM: XR CHEST PORT 1 VIEW  LOCATION: [facility]  DATE: 10/6/2023    INDICATION: hypoxia  COMPARISON: 10/05/2023      Impression    IMPRESSION: Cardiac enlargement. Dual-lumen central line tip right atrium. Bibasilar atelectasis or infiltrate and small effusions.         Billing: This patient is critically ill: Yes. Total critical care time today 45 min, excluding procedures.                       Malika Cassidy, MS4  [employer]        Physician Attestation   I, Laura Fernandes MD, was present with the medical/HARRY student who participated in the service and in the documentation of the note.  I have verified the history and personally performed the physical exam and medical decision making.  I agree with the assessment and plan of care as documented in the note.      Key findings: still on a small amount of vasopressors. Still having chest pain. Plan is for dialysis today. Bedside echo showed similar size of pericardial effusion.  - continue high intensity heparin for now. Can go back to usual Apixipan dose on discharge which is same for both PE/AFib  - Goal SBP is 90. Hope we can wean off after dialysis  - Consider colchicine for symptoms  - Continue Amiodarone  - Serial TTE  - Continue antibiotics until tomorrow and then if cultures result negative will discontinue    Laura Fernandes MD  Date of Service (when I saw the patient): 10/06/23  Critical Care Time: 45 minutes excluding time for procedures.

## 2023-10-06 NOTE — PLAN OF CARE
ICU End of Shift Summary. See flowsheets for vital signs and detailed assessment.    Changes this shift: A&O, continues to be hypotensive low dose Levo titrated to SBP>90, femoral A line and CVC in place. Amio infusion stopped, transitioned to PO, has remained SR, plans for echo. Heparin infusion therapeutic, next PTT recheck in am. Attempted HD today, pt became hypotensive 60's/30's despite midodrine, levo titrated, albumin given per dialysis RN, unable to remove fluids due to hypotension, nephrologist came to bedside to evaluate pt and discussed POC. L arm fistula infiltrated at beginning of dialysis, switched to HD line. Ice pack applied and arm elevated.      Plan:  Echo, levo titration to off  Goal Outcome Evaluation: Progressing

## 2023-10-06 NOTE — PROGRESS NOTES
Cardiology Progress Note          Assessment and Plan:       Pulmonary infiltrates    ESRD (end stage renal disease) on dialysis (H)    Pericardial effusion    Wide-complex tachycardia    Elevated troponin    Atrial fibrillation with rapid ventricular response (H)    Severe sepsis (H)    Multiple subsegmental pulmonary emboli without acute cor pulmonale (H)      Will recheck limited echo to make sure effusion not expanding on heparin  Low BP however only during dialysis  Rhythm stable will convert to po amiodarone                 Interval History:     no new complaints and doing well; no cp, sob, n/v/d, or abd pain.              Review of Systems:   As per subjective, otherwise 5 systems reviewed and negative.           Physical Exam:   Blood pressure 101/66, pulse 66, temperature 97.9  F (36.6  C), temperature source Oral, resp. rate 15, height 1.829 m (6'), weight 93.2 kg (205 lb 7.5 oz), SpO2 95 %.      Vital Sign Ranges  Temperature Temp  Av.3  F (36.8  C)  Min: 97.9  F (36.6  C)  Max: 98.7  F (37.1  C)   Blood pressure No data recorded.       No data recorded.      Pulse Pulse  Av.7  Min: 53  Max: 71   Respirations Resp  Av.1  Min: 7  Max: 35   Pulse oximetry SpO2  Av.4 %  Min: 89 %  Max: 98 %         Intake/Output Summary (Last 24 hours) at 10/6/2023 1307  Last data filed at 10/6/2023 1200  Gross per 24 hour   Intake 2222 ml   Output --   Net 2222 ml       Constitutional:   NAD   Skin:   Warm and dry   Head:   Nontraumatic   Neck:   Supple, symmetrical, trachea midline, no adenopathy, thyroid symmetric, not enlarged and no tenderness, skin normal   Lungs:   normal   Cardiovascular:   Normal apical impulse, regular rate and rhythm, normal S1 and S2, no S3 or S4, and no murmur noted    Abdomen:   Benign   Extremities and Back:   Symmetric, no curvature, spinous processes are non-tender on palpation, paraspinous muscles are non-tender on palpation, no costal vertebral tenderness    Neurological:   Grossly nonfocal            Medications:     No current outpatient medications on file.                Data:     Results for orders placed or performed during the hospital encounter of 10/05/23 (from the past 24 hour(s))   US Lower Extremity Venous Duplex Bilateral    Narrative    [facility]  DATE: 10/5/2023    EXAM: BILATERAL LOWER EXTREMITY VENOUS ULTRASOUND    INDICATION: Recent diagnosis PE on anticoagulation.    TECHNIQUE: Duplex imaging was performed utilizing gray-scale,  compression, augmentation as appropriate, color-flow, Doppler waveform  analysis, and spectral Doppler imaging.    COMPARISON: None..    FINDINGS:     RIGHT LOWER EXTREMITY:   The right common femoral and great saphenous veins are unable to be  assessed due to arterial line in place. The right profunda femoral,  femoral, popliteal, and segmentally visualized calf veins are patent  and compressible with appropriate vascular waveforms. No deep venous  thrombus is identified.    LEFT LOWER EXTREMITY:   The left common femoral, profunda femoral, femoral, popliteal, and  segmentally visualized calf veins are patent and compressible with  appropriate vascular waveforms. No deep venous thrombus is identified.  The great saphenous vein is patent.      Impression    IMPRESSION:   The right CFV and GSV are unable to be assessed due to the existing  arterial line. Exam is otherwise negative for deep vein thrombosis  bilaterally.    MARY JO FAN MD         SYSTEM ID:  [id number]   US Upper Extremity Venous Duplex Right    Narrative    [facility]  DATE: 10/5/2023    EXAM: RIGHT UPPER EXTREMITY VENOUS ULTRASOUND    INDICATION: Recently diagnosed PE on heparin.     TECHNIQUE: Duplex imaging was performed utilizing gray-scale,  compression, augmentation as appropriate, color-flow, and spectral  Doppler imaging with Doppler waveform analysis.     COMPARISON: None.    FINDINGS:  RIGHT ARM:  The right internal jugular vein is  patent and  compressible. The visualized innominate and subclavian veins are  patent with normal phasic waveforms. The right axillary, basilic, and  brachial veins are patent and compressible. Short segment thrombosis  in the right cephalic vein at the antecubital fossa. The right chest  tunnel line is not well evaluated due to acoustic shadowing.      Impression    IMPRESSION:   1. Negative for right upper extremity DVT.  2. Short segment DVT in the right cephalic vein at the antecubital  fossa, which may be related to prior intravenous access.    MARY JO FAN MD         SYSTEM ID:  [id number]   US Upper Extremity Venous Duplex Left    Narrative    VENOUS DUPLEX ULTRASOUND LEFT UPPER EXTREMITY  10/5/2023 2:57 PM     HISTORY: Patient with bilateral PEs, request made for evaluation of  DVT.    COMPARISON: None.    TECHNIQUE: Color Doppler and spectral waveform analysis performed  throughout the deep veins of the left upper extremity. Patient also  has an AV fistula, therefore color Doppler and spectral waveform  analysis obtained in the inflow brachial artery as well as outflow  fistulized vein.    FINDINGS: The left internal jugular, subclavian, axillary, and  brachial veins demonstrate normal blood flow, compression (where  applicable), and augmentation. Radial and ulnar veins are  compressible. Basilic vein is patent. Total blood flow volume is 1920  mL/min. Shortly beyond the mid to upper portion of the upper arm,  there is an area of narrowing measuring 2.6 mm with velocity of  515/301 cm/sec. More central segments are 4.9-8.8 mm. An area of  irregularity is noted in the upper arm, perhaps a small  pseudoaneurysmal segment measuring 7 x 2 mm.      Impression    IMPRESSION:  1. Negative for DVT in the left upper extremity.  2. Patent AV fistula with good blood flow volume of 1920 mL/min.  Moderate stenosis in the mid to upper outflow cephalic vein with  diameter reduced to 2.6 mm.    ABEBA SIMONS MD          SYSTEM ID:  [id number]   Glucose by meter   Result Value Ref Range    GLUCOSE BY METER POCT 178 (H) 70 - 99 mg/dL   Glucose by meter   Result Value Ref Range    GLUCOSE BY METER POCT 132 (H) 70 - 99 mg/dL   Partial thromboplastin time   Result Value Ref Range    aPTT 67 (H) 22 - 38 Seconds   Renal panel   Result Value Ref Range    Sodium 134 (L) 135 - 145 mmol/L    Potassium 4.6 3.4 - 5.3 mmol/L    Chloride 94 (L) 98 - 107 mmol/L    Carbon Dioxide (CO2) 23 22 - 29 mmol/L    Anion Gap 17 (H) 7 - 15 mmol/L    Glucose 152 (H) 70 - 99 mg/dL    Urea Nitrogen 60.5 (H) 8.0 - 23.0 mg/dL    Creatinine 7.03 (H) 0.67 - 1.17 mg/dL    GFR Estimate 8 (L) >60 mL/min/1.73m2    Calcium 8.9 8.6 - 10.0 mg/dL    Albumin 3.3 (L) 3.5 - 5.2 g/dL    Phosphorus 6.7 (H) 2.5 - 4.5 mg/dL   CBC with platelets   Result Value Ref Range    WBC Count 6.6 4.0 - 11.0 10e3/uL    RBC Count 3.11 (L) 4.40 - 5.90 10e6/uL    Hemoglobin 9.5 (L) 13.3 - 17.7 g/dL    Hematocrit 29.2 (L) 40.0 - 53.0 %    MCV 94 78 - 100 fL    MCH 30.5 26.5 - 33.0 pg    MCHC 32.5 31.5 - 36.5 g/dL    RDW 14.6 10.0 - 15.0 %    Platelet Count 117 (L) 150 - 450 10e3/uL   Partial thromboplastin time   Result Value Ref Range    aPTT 73 (H) 22 - 38 Seconds   ALT   Result Value Ref Range    ALT 10 0 - 70 U/L   AST   Result Value Ref Range    AST 12 0 - 45 U/L   Alkaline phosphatase   Result Value Ref Range    Alkaline Phosphatase 213 (H) 40 - 129 U/L   Bilirubin direct   Result Value Ref Range    Bilirubin Direct 0.34 (H) 0.00 - 0.30 mg/dL   Bilirubin  total   Result Value Ref Range    Bilirubin Total 0.6 <=1.2 mg/dL   Protein total   Result Value Ref Range    Protein Total 6.6 6.4 - 8.3 g/dL   XR Chest Port 1 View    Narrative    EXAM: XR CHEST PORT 1 VIEW  LOCATION: [facility]  DATE: 10/6/2023    INDICATION: hypoxia  COMPARISON: 10/05/2023      Impression    IMPRESSION: Cardiac enlargement. Dual-lumen central line tip right atrium. Bibasilar atelectasis or  infiltrate and small effusions.   Glucose by meter   Result Value Ref Range    GLUCOSE BY METER POCT 145 (H) 70 - 99 mg/dL   Phosphorus   Result Value Ref Range    Phosphorus 6.6 (H) 2.5 - 4.5 mg/dL     *Note: Due to a large number of results and/or encounters for the requested time period, some results have not been displayed. A complete set of results can be found in Results Review.

## 2023-10-06 NOTE — PLAN OF CARE
Goal Outcome Evaluation:  A/oX3, forgetful. VSS ex HR 50s. Tele SB. Levophed gtt for SBP>90. LS dim, MALDONADO. Tylenol for chronic shoulder and abd pain. HD pt, aneuric. +BS. A1gb/walker, turns IND in bed. Regular diet. . R femoral triple lumen CVC infusing Amnio gtt and Heparin gtt, recheck PTT at 0600. R femoral arterial line WNL. R PIV, IV abx. Maturing fistula to MARY. Plan for Vascular, PT/OT consults and dialysis today.      Plan of Care Reviewed With: patient    Overall Patient Progress: no changeOverall Patient Progress: no change

## 2023-10-06 NOTE — PROGRESS NOTES
Potassium   Date Value Ref Range Status   10/06/2023 4.6 3.4 - 5.3 mmol/L Final   12/29/2022 3.4 3.4 - 5.3 mmol/L Final   04/20/2020 3.9 3.4 - 5.3 mmol/L Final     Potassium POCT   Date Value Ref Range Status   10/05/2023 3.6 3.4 - 5.3 mmol/L Final     Hemoglobin   Date Value Ref Range Status   10/06/2023 9.5 (L) 13.3 - 17.7 g/dL Final   04/20/2020 14.3 13.3 - 17.7 g/dL Final     Creatinine   Date Value Ref Range Status   10/06/2023 7.03 (H) 0.67 - 1.17 mg/dL Final   04/20/2020 1.06 0.66 - 1.25 mg/dL Final     Urea Nitrogen   Date Value Ref Range Status   10/06/2023 60.5 (H) 8.0 - 23.0 mg/dL Final   12/29/2022 46 (H) 7 - 30 mg/dL Final   04/20/2020 23 7 - 30 mg/dL Final     Sodium   Date Value Ref Range Status   10/06/2023 134 (L) 135 - 145 mmol/L Final     Comment:     Reference intervals for this test were updated on 09/26/2023 to more accurately reflect our healthy population. There may be differences in the flagging of prior results with similar values performed with this method. Interpretation of those prior results can be made in the context of the updated reference intervals.    04/20/2020 139 133 - 144 mmol/L Final     INR   Date Value Ref Range Status   10/05/2023 1.55 (H) 0.85 - 1.15 Final   10/07/2019 1.20 (H) 0.86 - 1.14 Final       DIALYSIS PROCEDURE NOTE  Hepatitis status of previous patient on machine log was checked and verified ok to use with this patients hepatitis status.  Patient dialyzed for 3 hrs. on a K2 bath with a net fluid removal of  0L. Dr. Reddy aware.  A BFR of 200 ml/min was obtained via a left arm AVFistula using 17 gauge needles.  About half way into tx. access arm infiltrated. Switched to CVC, BFR of 400 ml/min. Dr. Reddy aware.     The treatment plan was discussed with Dr. Reddy during the treatment.    Total heparin received during the treatment: 0 units.   Needle cannulation sites held x 15 min.   Line flushed, clamped and capped with heparin 1:1000 4.3 mL (4300 units) per  lumen    Meds  given: 5% Albumin 500  mL. Midodrine given per primary ICU RN. Levo titrated during tx per ICU RN.   Complications: hypotension. 200 mL NS bolus given. No UF, levo titrated and Albumin given.     ICEBOAT? Timeout performed pre-treatment  I: Patient was identified using 2 identifiers  C:  Consent Signed Yes  E: Equipment preventative maintenance is current and dialysis delivery system OK to use  B: Hepatitis B Surface Antigen: negative; Draw Date: 9/27/2023      Hepatitis B Surface Antibody: susceptible; Draw Date: 9/27/2023  O: Dialysis orders present and complete prior to treatment  A: Vascular access verified and assessed prior to treatment  T: Treatment was performed at a clinically appropriate time  ?: Patient was allowed to ask questions and address concerns prior to treatment  See Adult Hemodialysis flowsheet in [employer] for further details and post assessment.  Machine water alarm in place and functioning. Transducer pods intact and checked every 15min.   Chlorine/Chloramine water system checked every 4 hours.  Outpatient Dialysis at [facility].      Post treatment report given to Faye MADSEN RN regarding 0.L of fluid removed.      Veena LEBRON RN [facility].

## 2023-10-06 NOTE — PROGRESS NOTES
[facility]     Renal Progress Note       SHORTHAND KEY FOR MY NOTES:  c = with, s = without, p = after, a = before, x = except, asx = asymptomatic, tx = transplant or treatment, sx = symptoms or symptomatic, cx = canceled or culture, rxn = reaction, yday = yesterday, nl = normal, abx = antibiotics, fxn = function, dx = diagnosis, dz = disease, m/h = melena/hematochezia, c/d/l/ha = cramping/dizziness/lightheadedness/headache, d/c = discharge or diarrhea/constipation, f/c/n/v = fevers/chills/nausea/vomiting, cp/sob = chest pain/shortness of breath, tbv = total body volume, rxn = reaction, tdc = tunneled dialysis catheter, pta = prior to admission, hd = hemodialysis, pd = peritoneal dialysis, hhd = home hemodialysis, edw = estimated dry wt         Assessment/Plan:     1.  ESKD. Pt is due for HD today. He thinks he needs to take more fluid off bc he ate more yday.  Clinically, he still seems a bit dry.    A.  Will use 5% alb prime and pull 0.5-1L.  B.  Midodrine 10 mg pre/mid-run.    2.  Sepsis syndrome.  Pt is still on low dose pressors.  A.  Wean pressors to keep SBP > 90.  B.  Abx at proper renal dose.    3.  PE.  Pt is on a heparin gtt.  Breathing ok at present and is not using supp o2.  A.  Follow clinically.    4.  Anemia.  Hb is in the 9s.  It seems like the higher hbs (11-12) are the aberration, not the norm.  A.  Follow hb.    5.  FEN.  Electrolytes are ok.        Interval History:     Pt is tired and has some pain.  No d/l today.  Breathing fine.          Medications and Allergies:      albumin human  250 mL Intravenous Once in dialysis/CRRT    [START ON 10/7/2023] gabapentin  100 mg Oral Daily    sodium chloride (PF) 0.9%  10 mL Intracatheter Once in dialysis/CRRT    Followed by    heparin  1.3-2.6 mL Intracatheter Once in dialysis/CRRT    sodium chloride (PF) 0.9%  10 mL Intracatheter Once in dialysis/CRRT    Followed by    heparin  1.3-2.6 mL Intracatheter Once in dialysis/CRRT     Lacosamide  100 mg Oral QPM    lacosamide  50 mg Oral QAM    melatonin  3 mg Oral At Bedtime    midodrine  10 mg Oral Once in dialysis/CRRT    midodrine  10 mg Oral Q Mon Wed Fri AM    multivitamin RENAL  1 capsule Oral Daily    - MEDICATION INSTRUCTIONS -   Does not apply Once    - MEDICATION INSTRUCTIONS -   Does not apply Once    pantoprazole  40 mg Oral QAM AC    piperacillin-tazobactam  2.25 g Intravenous Q6H    sevelamer carbonate  800 mg Oral 4x Daily    sodium chloride (PF)  9 mL Intracatheter During Dialysis/CRRT (from stock)    sodium chloride (PF)  9 mL Intracatheter During Dialysis/CRRT (from stock)    sodium chloride 0.9%  250 mL Intravenous Once in dialysis/CRRT    sodium chloride 0.9%  300 mL Hemodialysis Machine Once    sodium chloride 0.9%  300 mL Hemodialysis Machine Once    tacrolimus  1 mg Oral Q12H     No Known Allergies       Physical Exam:     Vitals were reviewed     , Blood pressure 101/66, pulse 63, temperature 98.3  F (36.8  C), temperature source Oral, resp. rate 11, height 1.829 m (6'), weight 93.2 kg (205 lb 7.5 oz), SpO2 94 %.  Wt Readings from Last 3 Encounters:   10/05/23 93.2 kg (205 lb 7.5 oz)   10/03/23 86.2 kg (190 lb)   10/01/23 87.9 kg (193 lb 11.2 oz)     Intake/Output Summary (Last 24 hours) at 10/6/2023 1035  Last data filed at 10/6/2023 0900  Gross per 24 hour   Intake 1920.46 ml   Output --   Net 1920.46 ml     GENERAL APPEARANCE: pleasant, looks ill  HEENT:  eyes/ears/nose/neck grossly nl  RESP: CTA B c good efforts anteriorly  CV: RRR, nl S1/S2   ABDOMEN: o/s/nt/nd  EXTREMITIES/SKIN: no ble edema  ACCESS: LAF c good thrill/bruit, RIJ TDC site ok         Data:     CBC RESULTS:     Recent Labs   Lab 10/06/23  0533 10/05/23  0251 10/05/23  0245 10/03/23  1638 10/01/23  0631   WBC 6.6  --  9.9 4.8 4.2   RBC 3.11*  --  3.68* 3.16* 3.08*   HGB 9.5* 12.6* 11.4* 9.8* 9.6*   HCT 29.2* 37* 35.2* 30.7* 30.2*   *  --  146* 93* 71*     Basic Metabolic Panel:  Recent Labs   Lab  10/06/23  0905 10/06/23  0533 10/05/23  2047 10/05/23  1611 10/05/23  0251 10/05/23  0245 10/03/23  1638 10/01/23  0631   NA  --  134*  --   --  135 136 137 134*   POTASSIUM  --  4.6  --   --  3.6 3.8 4.5 4.9   CHLORIDE  --  94*  --   --   --  90* 94* 91*   CO2  --  23  --   --   --  24 24 27   BUN  --  60.5*  --   --   --  44.5* 73.8* 68.9*   CR  --  7.03*  --   --   --  5.77* 8.64* 8.41*   * 152* 132* 178*  --  127* 113* 106*   KELLY  --  8.9  --   --   --  9.4 9.0 9.2     INR  Recent Labs   Lab 10/05/23  0245   INR 1.55*      Attestation:   I have reviewed today's relevant vital signs, notes, medications, labs and imaging.    Thierry Reddy MD  [facility] - Nephrology  [phone]

## 2023-10-06 NOTE — PLAN OF CARE
ICU End of Shift Nursing Summary *For vital signs and complete assessments, please see documentation flowsheets      Neuro:  A&Ox4, able to make needs known, follows commands, PERRLA, extremities strengths equal bilaterally  Pain: Reports some chronic shoulder and head pain controlled with PRN tylenol  CV:  Sinus keon, on levophed 0.04 keep systolic BP greater than 90, on amiodarone and heparin, denies chest pain  Pulm: LS diminished bilaterally, reports some SOB with activity, on RA  GI/: BS active, regular diet tolerating well, anuric on hemodialysis  Endocrine: Q4h BG  Skin: scattered scabbing scattered bruising  Lines: triple lumen R groin, R groin art line, PIV, hemodialysis catheter  Drips: Levophed 0.04, amiodarone 0.5, heparin 1050  Additional: L arm restriction fistula    Plan (Upcoming Events): Hemodialysis tomorrow    Bedside Shift Report Completed : yes  Bedside Safety Check Completed: yes

## 2023-10-07 ENCOUNTER — APPOINTMENT (OUTPATIENT)
Dept: CARDIOLOGY | Facility: CLINIC | Age: 58
DRG: 871 | End: 2023-10-07
Attending: INTERNAL MEDICINE
Payer: COMMERCIAL

## 2023-10-07 LAB
ALBUMIN SERPL BCG-MCNC: 3.4 G/DL (ref 3.5–5.2)
ALP SERPL-CCNC: 166 U/L (ref 40–129)
ALT SERPL W P-5'-P-CCNC: 8 U/L (ref 0–70)
ANION GAP SERPL CALCULATED.3IONS-SCNC: 16 MMOL/L (ref 7–15)
APTT PPP: 55 SECONDS (ref 22–38)
APTT PPP: 71 SECONDS (ref 22–38)
APTT PPP: 77 SECONDS (ref 22–38)
AST SERPL W P-5'-P-CCNC: 9 U/L (ref 0–45)
BILIRUB SERPL-MCNC: 0.6 MG/DL
BUN SERPL-MCNC: 40.9 MG/DL (ref 8–23)
CALCIUM SERPL-MCNC: 8.6 MG/DL (ref 8.6–10)
CHLORIDE SERPL-SCNC: 97 MMOL/L (ref 98–107)
CREAT SERPL-MCNC: 5.45 MG/DL (ref 0.67–1.17)
DEPRECATED HCO3 PLAS-SCNC: 25 MMOL/L (ref 22–29)
EGFRCR SERPLBLD CKD-EPI 2021: 11 ML/MIN/1.73M2
GLUCOSE BLDC GLUCOMTR-MCNC: 109 MG/DL (ref 70–99)
GLUCOSE BLDC GLUCOMTR-MCNC: 145 MG/DL (ref 70–99)
GLUCOSE SERPL-MCNC: 136 MG/DL (ref 70–99)
LVEF ECHO: NORMAL
POTASSIUM SERPL-SCNC: 4.2 MMOL/L (ref 3.4–5.3)
PROT SERPL-MCNC: 6.1 G/DL (ref 6.4–8.3)
SODIUM SERPL-SCNC: 138 MMOL/L (ref 135–145)

## 2023-10-07 PROCEDURE — 250N000009 HC RX 250: Performed by: INTERNAL MEDICINE

## 2023-10-07 PROCEDURE — 250N000011 HC RX IP 250 OP 636: Mod: JZ | Performed by: INTERNAL MEDICINE

## 2023-10-07 PROCEDURE — C8924 2D TTE W OR W/O FOL W/CON,FU: HCPCS

## 2023-10-07 PROCEDURE — 93308 TTE F-UP OR LMTD: CPT | Mod: 26 | Performed by: INTERNAL MEDICINE

## 2023-10-07 PROCEDURE — 85730 THROMBOPLASTIN TIME PARTIAL: CPT | Performed by: INTERNAL MEDICINE

## 2023-10-07 PROCEDURE — 255N000002 HC RX 255 OP 636: Performed by: INTERNAL MEDICINE

## 2023-10-07 PROCEDURE — 250N000013 HC RX MED GY IP 250 OP 250 PS 637: Performed by: INTERNAL MEDICINE

## 2023-10-07 PROCEDURE — 93321 DOPPLER ECHO F-UP/LMTD STD: CPT | Mod: 26 | Performed by: INTERNAL MEDICINE

## 2023-10-07 PROCEDURE — 80053 COMPREHEN METABOLIC PANEL: CPT | Performed by: INTERNAL MEDICINE

## 2023-10-07 PROCEDURE — 93325 DOPPLER ECHO COLOR FLOW MAPG: CPT | Mod: 26 | Performed by: INTERNAL MEDICINE

## 2023-10-07 PROCEDURE — 99291 CRITICAL CARE FIRST HOUR: CPT | Performed by: INTERNAL MEDICINE

## 2023-10-07 PROCEDURE — 99232 SBSQ HOSP IP/OBS MODERATE 35: CPT | Performed by: INTERNAL MEDICINE

## 2023-10-07 PROCEDURE — 250N000012 HC RX MED GY IP 250 OP 636 PS 637: Performed by: INTERNAL MEDICINE

## 2023-10-07 PROCEDURE — 200N000001 HC R&B ICU

## 2023-10-07 PROCEDURE — 99232 SBSQ HOSP IP/OBS MODERATE 35: CPT | Mod: 25 | Performed by: INTERNAL MEDICINE

## 2023-10-07 RX ORDER — GABAPENTIN 300 MG/1
300 CAPSULE ORAL AT BEDTIME
Status: DISCONTINUED | OUTPATIENT
Start: 2023-10-07 | End: 2023-10-23 | Stop reason: HOSPADM

## 2023-10-07 RX ORDER — OXYCODONE AND ACETAMINOPHEN 7.5; 325 MG/1; MG/1
1 TABLET ORAL EVERY 8 HOURS PRN
Status: DISCONTINUED | OUTPATIENT
Start: 2023-10-07 | End: 2023-10-15

## 2023-10-07 RX ORDER — ALOE VERA
GEL (GRAM) TOPICAL
Status: DISCONTINUED | OUTPATIENT
Start: 2023-10-07 | End: 2023-10-07

## 2023-10-07 RX ORDER — GUAIFENESIN 600 MG/1
600 TABLET, EXTENDED RELEASE ORAL 2 TIMES DAILY
Status: DISCONTINUED | OUTPATIENT
Start: 2023-10-07 | End: 2023-10-23 | Stop reason: HOSPADM

## 2023-10-07 RX ORDER — MIDODRINE HYDROCHLORIDE 5 MG/1
15 TABLET ORAL
Status: DISCONTINUED | OUTPATIENT
Start: 2023-10-07 | End: 2023-10-14

## 2023-10-07 RX ADMIN — MIDODRINE HYDROCHLORIDE 15 MG: 5 TABLET ORAL at 17:22

## 2023-10-07 RX ADMIN — AMIODARONE HYDROCHLORIDE 200 MG: 200 TABLET ORAL at 08:13

## 2023-10-07 RX ADMIN — GUAIFENESIN 600 MG: 600 TABLET, EXTENDED RELEASE ORAL at 03:49

## 2023-10-07 RX ADMIN — OXYCODONE HYDROCHLORIDE AND ACETAMINOPHEN 1 TABLET: 7.5; 325 TABLET ORAL at 11:42

## 2023-10-07 RX ADMIN — CAMPHOR AND MENTHOL: 5; 5 LOTION TOPICAL at 13:54

## 2023-10-07 RX ADMIN — SEVELAMER CARBONATE 800 MG: 800 TABLET, FILM COATED ORAL at 11:41

## 2023-10-07 RX ADMIN — OXYCODONE HYDROCHLORIDE AND ACETAMINOPHEN 1 TABLET: 7.5; 325 TABLET ORAL at 19:34

## 2023-10-07 RX ADMIN — PIPERACILLIN AND TAZOBACTAM 2.25 G: 2; .25 INJECTION, POWDER, FOR SOLUTION INTRAVENOUS at 01:53

## 2023-10-07 RX ADMIN — Medication 1 CAPSULE: at 08:13

## 2023-10-07 RX ADMIN — SEVELAMER CARBONATE 800 MG: 800 TABLET, FILM COATED ORAL at 08:13

## 2023-10-07 RX ADMIN — PIPERACILLIN AND TAZOBACTAM 2.25 G: 2; .25 INJECTION, POWDER, FOR SOLUTION INTRAVENOUS at 20:42

## 2023-10-07 RX ADMIN — HYDROMORPHONE HYDROCHLORIDE 0.2 MG: 0.2 INJECTION, SOLUTION INTRAMUSCULAR; INTRAVENOUS; SUBCUTANEOUS at 01:14

## 2023-10-07 RX ADMIN — HUMAN ALBUMIN MICROSPHERES AND PERFLUTREN 9 ML: 10; .22 INJECTION, SOLUTION INTRAVENOUS at 09:13

## 2023-10-07 RX ADMIN — PIPERACILLIN AND TAZOBACTAM 2.25 G: 2; .25 INJECTION, POWDER, FOR SOLUTION INTRAVENOUS at 08:13

## 2023-10-07 RX ADMIN — AMIODARONE HYDROCHLORIDE 200 MG: 200 TABLET ORAL at 20:42

## 2023-10-07 RX ADMIN — GUAIFENESIN 600 MG: 600 TABLET, EXTENDED RELEASE ORAL at 20:42

## 2023-10-07 RX ADMIN — TACROLIMUS 1 MG: 1 CAPSULE ORAL at 20:42

## 2023-10-07 RX ADMIN — HYDROMORPHONE HYDROCHLORIDE 0.2 MG: 0.2 INJECTION, SOLUTION INTRAMUSCULAR; INTRAVENOUS; SUBCUTANEOUS at 07:14

## 2023-10-07 RX ADMIN — HYDROMORPHONE HYDROCHLORIDE 0.2 MG: 0.2 INJECTION, SOLUTION INTRAMUSCULAR; INTRAVENOUS; SUBCUTANEOUS at 13:34

## 2023-10-07 RX ADMIN — MIDODRINE HYDROCHLORIDE 15 MG: 5 TABLET ORAL at 11:41

## 2023-10-07 RX ADMIN — TACROLIMUS 1 MG: 1 CAPSULE ORAL at 08:12

## 2023-10-07 RX ADMIN — HYDROXYZINE HYDROCHLORIDE 25 MG: 25 TABLET, FILM COATED ORAL at 08:13

## 2023-10-07 RX ADMIN — HYDROXYZINE HYDROCHLORIDE 25 MG: 25 TABLET, FILM COATED ORAL at 21:14

## 2023-10-07 RX ADMIN — MELATONIN TAB 3 MG 3 MG: 3 TAB at 21:14

## 2023-10-07 RX ADMIN — SEVELAMER CARBONATE 800 MG: 800 TABLET, FILM COATED ORAL at 17:22

## 2023-10-07 RX ADMIN — PANTOPRAZOLE SODIUM 40 MG: 40 TABLET, DELAYED RELEASE ORAL at 06:34

## 2023-10-07 RX ADMIN — LACOSAMIDE 50 MG: 50 TABLET, FILM COATED ORAL at 08:13

## 2023-10-07 RX ADMIN — ACETAMINOPHEN 650 MG: 325 TABLET, FILM COATED ORAL at 08:13

## 2023-10-07 RX ADMIN — LACOSAMIDE 100 MG: 50 TABLET, FILM COATED ORAL at 20:42

## 2023-10-07 RX ADMIN — GABAPENTIN 300 MG: 300 CAPSULE ORAL at 21:14

## 2023-10-07 RX ADMIN — ACETAMINOPHEN 650 MG: 325 TABLET, FILM COATED ORAL at 03:49

## 2023-10-07 RX ADMIN — GABAPENTIN 100 MG: 100 CAPSULE ORAL at 08:13

## 2023-10-07 RX ADMIN — QUETIAPINE FUMARATE 50 MG: 50 TABLET ORAL at 21:14

## 2023-10-07 RX ADMIN — NOREPINEPHRINE BITARTRATE 0.02 MCG/KG/MIN: 0.02 INJECTION, SOLUTION INTRAVENOUS at 01:53

## 2023-10-07 RX ADMIN — HEPARIN SODIUM 1200 UNITS/HR: 10000 INJECTION, SOLUTION INTRAVENOUS at 05:38

## 2023-10-07 RX ADMIN — PIPERACILLIN AND TAZOBACTAM 2.25 G: 2; .25 INJECTION, POWDER, FOR SOLUTION INTRAVENOUS at 13:57

## 2023-10-07 RX ADMIN — SEVELAMER CARBONATE 800 MG: 800 TABLET, FILM COATED ORAL at 20:42

## 2023-10-07 ASSESSMENT — ACTIVITIES OF DAILY LIVING (ADL)
ADLS_ACUITY_SCORE: 40

## 2023-10-07 NOTE — CARE PLAN
ICU End of Shift Summary. See flowsheets for vital signs and detailed assessment.     Changes this shift: A&O,X4 low dose Levo titrated to SBP>90, femoral A line and CVC in place. Paracentesis done 3L. Heparin infusion restarted rate of 1200 due to PTT sub therapeutic, next PTT recheck at noon. Pt C/O pain hydromophine 0.2 mg given. Low dose levo 0.02      Plan:  levo titration to off  Goal Outcome Evaluation: Progressing

## 2023-10-07 NOTE — PROCEDURES
[facility]    Procedure: Abdominal paracentesis    Date/Time: 10/6/2023 8:50 PM    Performed by: Ash Kilpatrick MD  Authorized by: Ash Kilpatrick MD      UNIVERSAL PROTOCOL   Site Marked: Yes  Prior Images Obtained and Reviewed:  Yes  Required items: Required blood products, implants, devices and special equipment available    Patient identity confirmed:  Verbally with patient, arm band, provided demographic data and hospital-assigned identification number  Patient was reevaluated immediately before administering moderate or deep sedation or anesthesia  Confirmation Checklist:  Patient's identity using two indicators, relevant allergies, procedure was appropriate and matched the consent or emergent situation and correct equipment/implants were available  Time out: Immediately prior to the procedure a time out was called    Universal Protocol: the Joint Commission Universal Protocol was followed    ESBL (mL):  2     ANESTHESIA    Local Anesthetic:  Lidocaine 1% without epinephrine  Anesthetic Total (mL):  5      SEDATION  Patient Sedated: Yes    Sedation:  Fentanyl and midazolam  Vital signs: Vital signs monitored during sedation      PROCEDURE  Describe Procedure: RLQ prepped in sterile fashion. Ascites pocket marked with US guidance. 8F cavity drainage catheter placed in sterile fashion. A total of 3L of cloudy, turbid milky fluid aspirated into vacuum bottles. Fluid sent for infection, LDH, glucose and chylomicrons   Patient Tolerance:  Patient tolerated the procedure well with no immediate complications  Length of time physician/provider present for 1:1 monitoring during sedation: 15   Dx ascites

## 2023-10-07 NOTE — PROGRESS NOTES
Brief ICU Note    RN called for SOB    Bedside US with similar appearance of pericardial effusion compared to 10/5   Large volume ascites    Plan to perform bedside paracentesis   BPAP for comfort  Seroquel for anxiety    Ash Kilpatrick MD, MHA  Associate Professor of Medicine  Section of Interventional Pulmonology   Division of Pulmonary, Allergy, Critical Care and Sleep Medicine   [facility], [place]  Pager: [phone]   Office: [phone]  Email: [email or web address]

## 2023-10-07 NOTE — PROGRESS NOTES
Critical Care  Note      10/07/2023    Name: Blanco Osborne MRN#: [id number]   Age: 58 year old YOB: 1965     Hsptl Day# 2  ICU DAY #    MV DAY #             Problem List:   Principal Problem:    Pulmonary infiltrates  Active Problems:    Other ascites    ESRD (end stage renal disease) on dialysis (H)    Pericardial effusion    Wide-complex tachycardia    Elevated troponin    Atrial fibrillation with rapid ventricular response (H)    Severe sepsis (H)    Multiple subsegmental pulmonary emboli without acute cor pulmonale (H)           Summary/Hospital Course:     Blanco Osborne is a 58 year old male with a complicated medical history, s/p liver transplant 2016, on chronic immunosuppression, pAF on chronic anticoagulation, ESRD on dialysis, with his last hospitalization ~6 mo in 4/2023, presented to the ED with chest discomfort and palpitations. Wife relays that he was diaphoretic and pale. He has not had similar symptoms in the past. He was recently admitted to [facility] (9/28) for a pericardial effusion and had this drained. During that time he was off of his apixaban for approximately 48 hours.   Upon arrival to ED was hypotensive and noted to have a wide complex tachycardia. Despite fluid resuscitation, remained hypotensive and with two attempts of unsuccessful emergent cardioversion. Subsequently amiodarone was initiated and after some time the patient did convert to normal sinus rhythm with improvement. He had elevated troponin however it is unclear if this is secondary to underlying acute coronary syndrome, demand ischemia from his tachycardia, or his end-stage renal disease. He has a known pericardial effusion, bedside echo demonstrates fluid therefore a formal echo was obtained with the read above. There is no signs of tamponade on the echo report. CT imaging of the chest abdomen pelvis demonstrated PE and pulmonary infiltrates and the patient was anticoagulated with heparin, and  received broad-spectrum antibiotic coverage for concern for sepsis. Patient responded nicely to judicious IV fluid resuscitation as well as administration of vasopressors.     Interval/overnight events:  Started on 2L NC O2 for desats to 80s, had received Dilaudid for pain. Able to breathe comfortably on RA by AM.     #Interval History:   -Continues to require mild pressers.  -Midodrine is added.       Assessment and plan :     Blanco Osborne IS a 58 year old male admitted on 10/5/2023 for hemodynamic instability.   I have personally reviewed the daily labs, imaging studies, cultures and discussed the case with referring physician and consulting physicians.     My assessment and plan by system for this patient is as follows:    Neurology/Psychiatry:   1. AxO3  2. Pain/analgesia  Noted that he should avoid opioids due to history of encephaloathy and confusion, even if patient requests.  Manage with Acetaminophen, PTA gabapentin 100mg  3. Sedation - not required at this time  4. Delirium - history of hospital delirium, hepatic encephalopathy  -Protocols: consider Delirium precautions    #History of Seizure  Per chart review was previously on Keppra and Lacosamide during hospitalization for seizures thought to be secondary to hypoxic events.  -Meds: PTA Lacosamide, per pharmacy levels not needed     #Itching #Anxiety #Restless Leg Syndrome  -PTA PRN Hydroxyzine, Ropinirole     #Delayed Sleep Onset  Difficulty onset with sleep in hospital setting.  -Meds: Ramelteon 8mg at bedtime      Cardiovascular:   Hemodynamics  #Shock, Cardiogenic - improving  #SVT with aberrancy #Paroxysmal Afib #History of Embolic Strokes  #Pericardial Effusion, Chronic  #Pulmonary Embolism  #Pulmonary Infiltrates  Decreasing pressor requirements with intermittent Levo at 0.01 mcg/kg/min to meet SysBP goals of >90mmg. On bedside ultrasound, pericardial effusion slightly increased with no evidence of tamponade. Likely worsening due to missed  dialysis and concurrent anticoagulation. On interview, characterization of chest pain more consistent with pericardial effusion over PE. No evidence of DVTs on doppler of all extremities. Per phone call with radiology, PE unlikely to cause burden large enough for significant RV strain. Likely worsening pericardial effusion and Afib caused acute presentation. Patient likely has low cardiac reserve from a worsening pericardial effusion, ESRD with incomplete dialysis, pulmonary hypertension/lung fibrosis from ~6mo hospitalization, that loss of atrial kick caused him to remain hypotensive and requiring pressors. Will continue with high-intensity heparin in the setting of pAF with a history of embolic strokes, and current PE. May consider IVC filter, though will elect for conservative management at this time due to complex medical history. Cardiology consulted and following, patient remains in sinus rhythm, will continue with amiodarone though may plan on continuing through hospitalization if effusion cannot be fully addressed due to contraindications. -Pressors: 0.01 Levo mcg/kg/min  -Consults: Cardiology  -Infusions: Heparin high-intensity  -Abx: Vancomycin, Zosyn (end date TBD)  -Labs: Coags, CBC, CMP  -Meds: Midodrine 15 TID , PO Amiodarone  200 mg BID  -Cultures: pericardial effusion 9/28 NGTD, pending blood, sputum    #Chest Pain 2/2 Pericardial Effusion  Per literature review, rare cases of patients on DOAC with recurrent spontaneous hemopericardial effusions and  thromboembolism were treated supportively with varying doses of colchicine -- presenting patients had significant polypharmacy with concurrent liver/renal dysfunction. Per pharmacy, currently no present hospital guideline for renal dosing of colchicine for pericarditis. Will start conservatively and increase if efficacious and well tolerated.  -Meds: Colchicine 0.6mg Q48H    Pulmonary/Ventilator Management:   1. Oxygenation/ventilation/mechanics  #Sating  90s on room air  No evidence of acidosis/alkalosis.      #Pulmonary Embolism  -Infusions: High Intensity Heparin       #Pulmonary Infiltrates, concerning for PNA  Will treat empirically in the setting of chronic immunosuppression, ESRD, and prolonged hospital course in 4/2023 (treated for ARDS, b/l pneumothoraces, hydroPTX, fungal PNA). As stated above.   -Abx: Vancomycin, Zosyn (end date TBD)  -Imaging: Chest XR     #Sleep Apnea  #Pulmonary Hypertension  Patient declines BiPap, says he does not use at home.      GI and Nutrition :   #S/p liver transplant 2016   #Chronic immunosuppression, on tacrolimus  Alk Phos elevated to 252, baseline in 200's. Appears well compensated based on labs, per chart review thought to have been able to regenerate after cirrhosis last year. On chronic immunosuppression with Tacrolimus 1mg BID. Historically has been continued in the setting of sepsis/infection.   -Meds: Tacrolimus 1mg BID, to follow up with levels s/p 2-3 doses.    #Peritoneal fluid:   -Ascites  -Concerned for infection. Increased wbcs.        #Renal Diet    #PPI PPX     Renal/Fluids/Electrolytes:   #ESRD on dialysis (MWF)  Last dialysis on 10/4 Wednesday, will continue in hospital. Nephrology consulted, will appreciate recommendations. BUN/Cr consistent with ESRD. Replete lytes as necessary. Likely contributory to troponemia. Nephrology consulted.  -Labs: Trend CMP, Phos  -Meds: PTA Sevelamer QID, can skip last dose, Midodrine for hypotension during dialysis  -Consults: Nephrology - Continue dialysis MWF  -Protocols: replete lytes as necessary     Infectious Disease:   #Pulmonary Infiltrates  #Chronic Immunosuppression  As stated above.     Endocrine:   #Stress induced hyperglycemia  A1c 8/23 4.7. Did not require IV insulin during last hospitalization (4/23). Will discontinue if needed.   -Hyperglycemia Protocol     Hematology/Oncology:    #Anemia, secondary to ESRD, chronic disease. No signs, symptoms of active blood  loss  Baseline hgb 7-8, per chart review possible Epo resistance.   -Labs: CBC     #Hypercoaguability  #History of embolic strokes  As stated above.     IV/Access:   1. Venous access - Central Line, PIV  2. Arterial access - A line, right femoral    ICU Prophylaxis:   1. DVT: Hep Subq/ LMWH/mechanical  2. VAP: HOB 30 degrees, chlorhexidine rinse  3. Stress Ulcer: PPI  4. Restraints: Nonviolent soft two point restraints required and necessary for patient safety and continued cares and good effect as patient continues to pull at necessary lines, tubes despite education and distraction. Will readdress daily.   5. Wound care  - n/a  6. Feeding - Renal Diet  7. Family Update:Wife Ofe  8. Disposition - ICU     Clinically Significant Risk Factors              # Hypoalbuminemia: Lowest albumin = 3.3 g/dL at 10/6/2023  5:33 AM, will monitor as appropriate    # Coagulation Defect: INR = 1.55 (Ref range: 0.85 - 1.15) and/or PTT = 71 Seconds (Ref range: 22 - 38 Seconds), will monitor for bleeding    # Thrombocytopenia: Lowest platelets = 117 in last 2 days, will monitor for bleeding     # Hypertension: Noted on problem list        # Overweight: Estimated body mass index is 27.27 kg/m  as calculated from the following:    Height as of this encounter: 1.829 m (6').    Weight as of this encounter: 91.2 kg (201 lb 1 oz)., PRESENT ON ADMISSION                                Key Medications:      amiodarone  200 mg Oral BID    gabapentin  300 mg Oral At Bedtime    guaiFENesin  600 mg Oral BID    Lacosamide  100 mg Oral QPM    lacosamide  50 mg Oral QAM    lidocaine (buffered or not buffered)  5 mL Intradermal Once    melatonin  3 mg Oral At Bedtime    midodrine  15 mg Oral TID w/meals    multivitamin RENAL  1 capsule Oral Daily    pantoprazole  40 mg Oral QAM AC    piperacillin-tazobactam  2.25 g Intravenous Q6H    sevelamer carbonate  800 mg Oral 4x Daily    sodium chloride (PF)  3 mL Intracatheter Q8H    tacrolimus  1 mg Oral Q12H       heparin 1,200 Units/hr (10/07/23 1200)    norepinephrine 0.02 mcg/kg/min (10/07/23 1450)    - MEDICATION INSTRUCTIONS -                Physical Examination:   Temp:  [98  F (36.7  C)-98.6  F (37  C)] 98  F (36.7  C)  Pulse:  [65-82] 70  Resp:  [4-47] 7  BP: (79)/(43) 79/43  MAP:  [55 mmHg-94 mmHg] 78 mmHg  Arterial Line BP: ()/(39-72) 113/60  SpO2:  [89 %-100 %] 95 %    Intake/Output Summary (Last 24 hours) at 10/6/2023 0900  Last data filed at 10/6/2023 0800  Gross per 24 hour   Intake 1770.53 ml   Output --   Net 1770.53 ml     Wt Readings from Last 4 Encounters:   10/07/23 91.2 kg (201 lb 1 oz)   10/03/23 86.2 kg (190 lb)   10/01/23 87.9 kg (193 lb 11.2 oz)   09/21/23 88.5 kg (195 lb)     Arterial Line BP: ()/(39-72) 113/60  MAP:  [55 mmHg-94 mmHg] 78 mmHg  BP - Mean:  [53] 53  Resp: (!) 7    No lab results found in last 7 days.    GEN: no acute distress   HEENT: head ncat, sclera anicteric, OP patent, trachea midline   PULM: unlabored synchronous with vent, clear anteriorly    CV/COR: RRR S1S2 no gallop,  No rub, no murmur  ABD: slightly tender at RUQ, hypoactive bowel sounds, no mass  EXT:  warm and well perfused x4  NEURO: PERRL, no obvious deficits  SKIN: no obvious rash  LINES: clean, dry intact         Data:   All data and imaging reviewed     ROUTINE ICU LABS (Last four results)  CMP  Recent Labs   Lab 10/07/23  0743 10/07/23  0431 10/06/23  1530 10/06/23  1040 10/06/23  0905 10/06/23  0533 10/05/23  1611 10/05/23  0251 10/05/23  0245 10/03/23  1638   NA  --  138  --   --   --  134*  --  135 136 137   POTASSIUM  --  4.2  --   --   --  4.6  --  3.6 3.8 4.5   CHLORIDE  --  97*  --   --   --  94*  --   --  90* 94*   CO2  --  25  --   --   --  23  --   --  24 24   ANIONGAP  --  16*  --   --   --  17*  --   --  22* 19*   * 136* 139*  --  145* 152*   < >  --  127* 113*   BUN  --  40.9*  --   --   --  60.5*  --   --  44.5* 73.8*   CR  --  5.45*  --   --   --  7.03*  --   --  5.77* 8.64*    GFRESTIMATED  --  11*  --   --   --  8*  --   --  11* 7*   KELLY  --  8.6  --   --   --  8.9  --   --  9.4 9.0   MAG  --   --   --   --   --   --   --   --  2.1  --    PHOS  --   --   --  6.6*  --  6.7*  --   --  5.2*  --    PROTTOTAL  --  6.1*  --   --   --  6.6  --   --  7.6  --    ALBUMIN  --  3.4*  --   --   --  3.3*  --   --  3.9  --    BILITOTAL  --  0.6  --   --   --  0.6  --   --  1.3*  --    ALKPHOS  --  166*  --   --   --  213*  --   --  252*  --    AST  --  9  --   --   --  12  --   --  17  --    ALT  --  8  --   --   --  10  --   --  13  --     < > = values in this interval not displayed.     CBC  Recent Labs   Lab 10/06/23  0533 10/05/23  0251 10/05/23  0245 10/03/23  1638 10/01/23  0631   WBC 6.6  --  9.9 4.8 4.2   RBC 3.11*  --  3.68* 3.16* 3.08*   HGB 9.5* 12.6* 11.4* 9.8* 9.6*   HCT 29.2* 37* 35.2* 30.7* 30.2*   MCV 94  --  96 97 98   MCH 30.5  --  31.0 31.0 31.2   MCHC 32.5  --  32.4 31.9 31.8   RDW 14.6  --  14.3 14.6 14.6   *  --  146* 93* 71*     INR  Recent Labs   Lab 10/05/23  0245   INR 1.55*     Arterial Blood GasNo lab results found in last 7 days.    All cultures:  No results for input(s): CULT in the last 168 hours.  Recent Results (from the past 24 hour(s))   US Lower Extremity Venous Duplex Bilateral    Narrative    [facility]  DATE: 10/5/2023    EXAM: BILATERAL LOWER EXTREMITY VENOUS ULTRASOUND    INDICATION: Recent diagnosis PE on anticoagulation.    TECHNIQUE: Duplex imaging was performed utilizing gray-scale,  compression, augmentation as appropriate, color-flow, Doppler waveform  analysis, and spectral Doppler imaging.    COMPARISON: None..    FINDINGS:     RIGHT LOWER EXTREMITY:   The right common femoral and great saphenous veins are unable to be  assessed due to arterial line in place. The right profunda femoral,  femoral, popliteal, and segmentally visualized calf veins are patent  and compressible with appropriate vascular waveforms. No deep venous  thrombus is  identified.    LEFT LOWER EXTREMITY:   The left common femoral, profunda femoral, femoral, popliteal, and  segmentally visualized calf veins are patent and compressible with  appropriate vascular waveforms. No deep venous thrombus is identified.  The great saphenous vein is patent.      Impression    IMPRESSION:   The right CFV and GSV are unable to be assessed due to the existing  arterial line. Exam is otherwise negative for deep vein thrombosis  bilaterally.    MARY JO FAN MD         SYSTEM ID:  [id number]   US Upper Extremity Venous Duplex Right    Narrative    [facility]  DATE: 10/5/2023    EXAM: RIGHT UPPER EXTREMITY VENOUS ULTRASOUND    INDICATION: Recently diagnosed PE on heparin.     TECHNIQUE: Duplex imaging was performed utilizing gray-scale,  compression, augmentation as appropriate, color-flow, and spectral  Doppler imaging with Doppler waveform analysis.     COMPARISON: None.    FINDINGS:  RIGHT ARM:  The right internal jugular vein is patent and  compressible. The visualized innominate and subclavian veins are  patent with normal phasic waveforms. The right axillary, basilic, and  brachial veins are patent and compressible. Short segment thrombosis  in the right cephalic vein at the antecubital fossa. The right chest  tunnel line is not well evaluated due to acoustic shadowing.      Impression    IMPRESSION:   1. Negative for right upper extremity DVT.  2. Short segment DVT in the right cephalic vein at the antecubital  fossa, which may be related to prior intravenous access.    MARY JO FAN MD         SYSTEM ID:  [id number]   US Upper Extremity Venous Duplex Left    Narrative    VENOUS DUPLEX ULTRASOUND LEFT UPPER EXTREMITY  10/5/2023 2:57 PM     HISTORY: Patient with bilateral PEs, request made for evaluation of  DVT.    COMPARISON: None.    TECHNIQUE: Color Doppler and spectral waveform analysis performed  throughout the deep veins of the left upper extremity. Patient also  has an AV  fistula, therefore color Doppler and spectral waveform  analysis obtained in the inflow brachial artery as well as outflow  fistulized vein.    FINDINGS: The left internal jugular, subclavian, axillary, and  brachial veins demonstrate normal blood flow, compression (where  applicable), and augmentation. Radial and ulnar veins are  compressible. Basilic vein is patent. Total blood flow volume is 1920  mL/min. Shortly beyond the mid to upper portion of the upper arm,  there is an area of narrowing measuring 2.6 mm with velocity of  515/301 cm/sec. More central segments are 4.9-8.8 mm. An area of  irregularity is noted in the upper arm, perhaps a small  pseudoaneurysmal segment measuring 7 x 2 mm.      Impression    IMPRESSION:  1. Negative for DVT in the left upper extremity.  2. Patent AV fistula with good blood flow volume of 1920 mL/min.  Moderate stenosis in the mid to upper outflow cephalic vein with  diameter reduced to 2.6 mm.    ABEBA SIMONS MD         SYSTEM ID:  [id number]   XR Chest Port 1 View    Narrative    EXAM: XR CHEST PORT 1 VIEW  LOCATION: [facility] [place]  DATE: 10/6/2023    INDICATION: hypoxia  COMPARISON: 10/05/2023      Impression    IMPRESSION: Cardiac enlargement. Dual-lumen central line tip right atrium. Bibasilar atelectasis or infiltrate and small effusions.         Billing: This patient is critically ill: Yes. Total critical care time today 45 min, excluding procedures.                               Physician Attestation   I, Dominik Turner MD, participated in the service and in the documentation of the note.  I have verified the history and personally performed the physical exam and medical decision making.  I agree with the assessment and plan of care as documented in the note.        Dominik Turner MD  Date of Service (when I saw the patient): 10/06/23

## 2023-10-07 NOTE — PLAN OF CARE
ICU End of Shift Summary. See flowsheets for vital signs and detailed assessment.    Changes this shift: A&O, BP improving throughout shift, low dose levo on for short period. Scheduled midodrine started. PRN percocet and dilaudid given for pain with some relief. Abdomen rounded/distended. Reports SOB, 2L NC applied, crackle in bases. Heparin infusing, PTT recheck sent, awaiting result.     Plan: Pull A line, CVC, scheduled midodrine. Bipap overnight.   Goal Outcome Evaluation: Progressing

## 2023-10-07 NOTE — PROGRESS NOTES
Renal Medicine Progress Note            Assessment/Plan:     1.  ESKD. MWF HD. No UF on HD yesterday. Volume excess may be only in abd, pericardial spaces and not easily amenable to removal with HD.    Planned next HD Monday 10/9 unless some urgent indication over the weekend.     Will plan to rest left AVF with infiltration 10//6 and continue CVC for now.    2.  Sepsis syndrome.  Pt is still on low dose pressors.  A.  Wean pressors to keep SBP > 90.  B.  Consider scheduled midodrine to facilitate getting off levophed.      3.  PE.  Pt is on a heparin gtt.  Breathing ok at present and is not using supp o2.  A.  Follow clinically.     4.  Anemia.  stable     5.  FEN.  Electrolytes are ok.    Torsten Montaño DO  [facility]  Office: [phone]  Cell: [phone]        Interval History:      S/p HD yesterday, infiltration of his left AVF and transition to his CVC. NO UF done on the session. Pt getting TTE when seen.          Medications and Allergies:      amiodarone  200 mg Oral BID    gabapentin  100 mg Oral Daily    guaiFENesin  600 mg Oral BID    Lacosamide  100 mg Oral QPM    lacosamide  50 mg Oral QAM    lidocaine (buffered or not buffered)  5 mL Intradermal Once    melatonin  3 mg Oral At Bedtime    midodrine  10 mg Oral Q Mon Wed Fri AM    multivitamin RENAL  1 capsule Oral Daily    pantoprazole  40 mg Oral QAM AC    piperacillin-tazobactam  2.25 g Intravenous Q6H    sevelamer carbonate  800 mg Oral 4x Daily    sodium chloride (PF)  3 mL Intracatheter Q8H    tacrolimus  1 mg Oral Q12H      No Known Allergies         Physical Exam:   Vitals were reviewed  /62   Pulse 75   Temp 98.5  F (36.9  C) (Oral)   Resp 27   Ht 1.829 m (6')   Wt 91.2 kg (201 lb 1 oz)   SpO2 93%   BMI 27.27 kg/m      Wt Readings from Last 3 Encounters:   10/07/23 91.2 kg (201 lb 1 oz)   10/03/23 86.2 kg (190 lb)   10/01/23 87.9 kg (193 lb 11.2 oz)       Intake/Output Summary (Last 24 hours) at 10/7/2023 0842  Last  data filed at 10/7/2023 0400  Gross per 24 hour   Intake 1382.98 ml   Output 0 ml   Net 1382.98 ml       GENERAL APPEARANCE: pleasant, looks ill  HEENT:  eyes/ears/nose/neck grossly nl  RESP: CTA B c good efforts anteriorly  CV: RRR, nl S1/S2   ABDOMEN: o/s/nt/nd  EXTREMITIES/SKIN: no peripheral edema. Left arm with area of swelling/infiltrate and tender to touch  ACCESS: LAF c good thrill/bruit, RIJ TDC site ok           Data:     BMP  Recent Labs   Lab 10/07/23  0743 10/07/23  0431 10/06/23  1530 10/06/23  0905 10/06/23  0533 10/05/23  1611 10/05/23  0251 10/05/23  0245 10/03/23  1638   NA  --  138  --   --  134*  --  135 136 137   POTASSIUM  --  4.2  --   --  4.6  --  3.6 3.8 4.5   CHLORIDE  --  97*  --   --  94*  --   --  90* 94*   KELLY  --  8.6  --   --  8.9  --   --  9.4 9.0   CO2  --  25  --   --  23  --   --  24 24   BUN  --  40.9*  --   --  60.5*  --   --  44.5* 73.8*   CR  --  5.45*  --   --  7.03*  --   --  5.77* 8.64*   * 136* 139* 145* 152*   < >  --  127* 113*    < > = values in this interval not displayed.     CBC  Recent Labs   Lab 10/06/23  0533 10/05/23  0251 10/05/23  0245 10/03/23  1638 10/01/23  0631   WBC 6.6  --  9.9 4.8 4.2   HGB 9.5* 12.6* 11.4* 9.8* 9.6*   HCT 29.2* 37* 35.2* 30.7* 30.2*   MCV 94  --  96 97 98   *  --  146* 93* 71*     Lab Results   Component Value Date    AST 9 10/07/2023    ALT 8 10/07/2023    ALKPHOS 166 (H) 10/07/2023    BILITOTAL 0.6 10/07/2023    CHANDLER 14 (L) 10/05/2023     Lab Results   Component Value Date    INR 1.55 (H) 10/05/2023       Attestation:  I have reviewed today's vital signs, notes, medications, labs and imaging.    DO [facility] - Nephrology  Office: [phone]  Cell: [phone]

## 2023-10-07 NOTE — PROGRESS NOTES
[facility]    Cardiology Consultation - Progress Note     Date of Admission:  10/5/2023  Date of Service: 10/07/23    Reason for Consult   Reason for consult: pericardial effusion, wide complex tachycardia    History of Present Illness   Blanco Osborne is a 58 year old male who was admitted on 10/5/2023 for pulmonary embolism, sepsis and wide complex tachycardia. Medical comorbidities include paroxysmal atrial fibrillation on chronic anticoagulation, chronic pericardial effusion, end stage liver disease s/p liver transplant 2016, on chronic immunosuppression, ESRD on dialysis.    Yesterday, the patient remained in the ICU given ongoing vasopressor needs (NE). Initial troponin 346 around this time. He initially received IV Amiodarone infusion for wide complex tachycardia (looks like atrial fibrillation with aberrancy) with conversion to sinus rhythm, now transitioned to PO Amiodarone (200 mg BID). He underwent bedside POCUS revealing reportedly unchanged pericardial effusion, no tamponade physiology bur did have large volume ascites and is now s/p diagnostic and therapeutic paracentesis (3L removed). Patient had attempted HD but due to hypotension no fluid removal was performed. It was thought both from his tachycardia (atrial fibrillation, chronic) and pericardial effusion that these are likely the culprits of his presentation.    Electrolytes stable this AM. Patient on 0.01 NE. IV Heparin for PE. Patient reports left upper arm discomfort this a.m. related to IV infiltrating AV fistula during dialysis session yesterday.  He denies chest pain or dyspnea.  I performed point-of-care bedside ultrasound showing moderate circumferential pericardial effusion with no signs of tamponade physiology on my assessment.  He also underwent formal TTE today revealing moderate pericardial effusion no signs of tamponade physiology.    Assessment & Plan   #1 Pericardial Effusion, chronic  #2 Atrial  Fibrillation, paroxysmal, now sinus rhythm  #3 ESRD on HD  #4 Pulmonary Embolism  #5 Chronic Anticoagulation    In summary, Mr. Osborne presents with pulmonary embolism and sepsis.  He continues on heparin for his pulmonary embolism and low-dose vasopressors and antibiotics for sepsis. He had previously demonstrated wide-complex tachycardia perhaps suggestive of atrial fibrillation with aberrant conduction.  It would be reasonable to continue oral amiodarone at this point, currently dosed at 200 mg twice daily while he continues to heal from his acute stressors which I suspect were the etiology of his arrhythmia.  His pericardial effusion does not show signs of tamponade physiology on my assessment; per [id number] images it does look slightly enlarged as compared to limited TTE on 10/5; we will continue to monitor.  He would benefit from repeat limited echocardiogram on 10/9 or sooner if he developed any hemodynamic decompensation.     Plan:  Continue amiodarone 200 mg twice daily for now, will consider de-escalation to 200 mg daily after amiodarone load of at least 5 g  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)    Acquired immunocompromised state (H24)    Sepsis due to Streptococcus pneumoniae with acute hypoxic respiratory failure (H)    Encounter for therapeutic drug monitoring    Aspergillus pneumonia (H)    Embolic stroke (H)    Hyperammonemia (H24)    Pneumothorax    Critical illness  myopathy    Pneumothorax on left    Encephalopathy    PEA (Pulseless electrical activity) (H)    Seizures (H)    Hypotension, unspecified hypotension type    Acute on chronic respiratory failure with hypoxia (H)    History of sudden cardiac arrest, PEA    ESRD (end stage renal disease) on dialysis (H)    Anemia of chronic disease due to ESRD, cirrhosis and hypersplenism    Parapneumonic effusion    Pleural effusion    Abnormal liver function test    Colon adenoma    Dyslipidemia    Gastroesophageal reflux disease with esophagitis    CHRISTIAN on CPAP    Umbilical hernia    Dialysis AV fistula malfunction, sequela    Pericardial effusion    Pulmonary infiltrates    Wide-complex tachycardia    Elevated troponin    Atrial fibrillation with rapid ventricular response (H)    Severe sepsis (H)    Multiple subsegmental pulmonary emboli without acute cor pulmonale (H)       Past Medical History   I have reviewed this patient's medical history and updated it with pertinent information if needed.   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    Cancer (H) 2023    Squamous Cell Cancer- Since resolved    Cirrhosis of liver with ascites (H) 02/11/2016    Coagulopathy (H24)     Critical illness myopathy     Dialysis patient (H24)     DIALYSIS 3X/ WEEK    Encephalopathy     ESRD (end stage renal disease) on dialysis (H)     Gastroesophageal reflux disease     H/O alcohol abuse     History of blood transfusion     Hyperammonemia (H24)     Hyperlipidemia     Hypertension     Patients states that HTN has been resolved    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    Insomnia     Lactic acidosis 11/12/2022    Liver replaced by transplant (H) 09/20/2016    NAFLD (nonalcoholic fatty liver  disease) 02/11/2016    CHRISTIAN on CPAP     Parainfluenza type 1 infection 11/19/2022    Parapneumonic effusion     Pleural effusion     Pneumothorax on left 12/16/2022    Respiratory acidosis 11/12/2022    Respiratory arrest (H) 11/12/2022    Restless legs syndrome (RLS)     SBP (spontaneous bacterial peritonitis) (H)     MNGI    Seizures (H) 12/2022    Sepsis due to Streptococcus pneumoniae with acute hypoxic respiratory failure (H) 11/19/2022    Stroke, embolic (H) 11/20/2022    Sudden cardiac arrest (H) 12/29/2022    PEA- 2 min of CPR    Tubular adenoma 10/2019    Large cecal adenoma- due for surveillance colonoscopy in 3 years (10/2022)       Past Surgical History   I have reviewed this patient's surgical history and updated it with pertinent information if needed.  Past Surgical History:   Procedure Laterality Date    APPENDECTOMY      BENCH LIVER N/A 09/20/2016    Procedure: BENCH LIVER;  Surgeon: Enoc Crews MD;  Location: [facility] OR    BRONCHOSCOPY FLEXIBLE AND RIGID N/A 12/20/2022    Procedure: BRONCHOSCOPY;  Surgeon: Alena Valenzuela MD;  Location:  GI    COLONOSCOPY  08/06/2013    repeat in 2018    COLONOSCOPY N/A 10/04/2019    Procedure: COLONOSCOPY, WITH POLYPECTOMY AND BIOPSY;  Surgeon: Go Chong MD;  Location: [facility] OR    CREATE FISTULA ARTERIOVENOUS UPPER EXTREMITY Left 03/21/2023    Procedure: LEFT UPPER EXTREMITY ARTERIOVENOUS FISTULA CREATION. LIGATION OF COMPETING VASCULAR BRANCHES- LEFT;  Surgeon: Deejay Mcneil MD;  Location:  OR    CV PERICARDIOCENTESIS N/A 9/29/2023    Procedure: Pericardiocentesis;  Surgeon: Barry Mckeon MD;  Location:  [facility]    HERNIA REPAIR      IR CHEST TUBE PLACEMENT NON-TUNNELED RIGHT  12/12/2022    IR CVC TUNNEL PLACEMENT > 5 YRS OF AGE  12/06/2022    IR DIALYSIS FISTULOGRAM LEFT  07/13/2023    IR GASTROSTOMY TUBE CHANGE  02/08/2023    IR GASTROSTOMY TUBE PERCUTANEOUS PLCMNT  11/29/2022    IR PARACENTESIS   2022    IR PARACENTESIS  2022    IR THORACENTESIS  2022    IR THORACENTESIS  2020    IR THORACENTESIS  08/10/2020    IR TRANSCATHETER BIOPSY  2022    REVISION FISTULA ARTERIOVENOUS UPPER EXTREMITY Left 8/15/2023    Procedure: 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 2; OUTFLOW REVISION CEPHALIC TO BRACHIAL VEIN;  Surgeon: Deejay Mcneil MD;  Location: [facility] OR    TRACHEOSTOMY N/A 2022    Procedure: TRACHEOSTOMY;  Surgeon: Nilesh Jackson MD;  Location: [facility] OR    TRANSPLANT LIVER RECIPIENT  DONOR N/A 2016    Procedure: TRANSPLANT LIVER RECIPIENT  DONOR;  Surgeon: Enoc Crews MD;  Location: [facility] OR       Prior to Admission Medications   Prior to Admission Medications   Prescriptions Last Dose Informant Patient Reported? Taking?   Lacosamide (VIMPAT) 100 MG TABS tablet 10/4/2023 Spouse/Significant Other No Yes   Sig: Take 1 tablet (100 mg) by mouth every evening   acetaminophen (TYLENOL) 325 MG tablet prn at prn Spouse/Significant Other No Yes   Sig: Take 2 tablets (650 mg) by mouth every 4 hours as needed for other (For optimal non-opioid multimodal pain management to improve pain control.)   apixaban ANTICOAGULANT (ELIQUIS) 5 MG tablet 10/4/2023 Spouse/Significant Other No Yes   Sig: Take 1 tablet (5 mg) by mouth 2 times daily for 90 days   gabapentin (NEURONTIN) 100 MG capsule 10/4/2023 Spouse/Significant Other No Yes   Sig: Take 1 capsule (100 mg) by mouth daily   hydrOXYzine (ATARAX) 25 MG tablet prn at prn Spouse/Significant Other No Yes   Sig: Take 1 tablet (25 mg) by mouth 3 times daily as needed for itching   lacosamide (VIMPAT) 50 MG TABS tablet 10/4/2023 Spouse/Significant Other Yes Yes   Sig: Take 50 mg by mouth every morning   melatonin 3 MG tablet 10/4/2023 Spouse/Significant Other No Yes   Sig: Take 1 tablet (3 mg) by mouth At Bedtime   midodrine (PROAMATINE) 10 MG tablet 10/4/2023 Spouse/Significant Other No Yes   Sig:  Take 1 tab 3xDay, during patient's dialysis days , Monday, Wednesday, Friday, patient takes 2 extra Midodrine one hour before dialysis, takes 1 more Midodrine when he gets there, and takes 1 more tablet during dialysis, this is an addition to patient's scheduled doses   multivitamin RENAL (TRIPHROCAPS) 1 capsule capsule 10/4/2023 Spouse/Significant Other No Yes   Sig: Take 1 capsule by mouth daily   oxyCODONE (ROXICODONE) 5 MG tablet prn at prn Spouse/Significant Other No Yes   Sig: Take 1 tablet (5 mg) by mouth every 4 hours as needed for moderate pain   rOPINIRole (REQUIP) 0.5 MG tablet prn at prn Spouse/Significant Other Yes Yes   Sig: Take 0.5 mg by mouth 2 times daily as needed (restless legs)   sevelamer carbonate (RENVELA) 800 MG tablet 10/4/2023 Spouse/Significant Other Yes Yes   Sig: Take 800 mg by mouth 4 times daily With meals and snacks   tacrolimus (GENERIC EQUIVALENT) 1 MG capsule 10/4/2023 Spouse/Significant Other No Yes   Sig: Take 1 capsule (1 mg) by mouth every 12 hours      Facility-Administered Medications: None     Current Facility-Administered Medications   Medication Dose Route Frequency    amiodarone  200 mg Oral BID    gabapentin  100 mg Oral Daily    guaiFENesin  600 mg Oral BID    Lacosamide  100 mg Oral QPM    lacosamide  50 mg Oral QAM    lidocaine (buffered or not buffered)  5 mL Intradermal Once    melatonin  3 mg Oral At Bedtime    midodrine  10 mg Oral Q Mon Wed Fri AM    multivitamin RENAL  1 capsule Oral Daily    pantoprazole  40 mg Oral QAM AC    piperacillin-tazobactam  2.25 g Intravenous Q6H    sevelamer carbonate  800 mg Oral 4x Daily    sodium chloride (PF)  3 mL Intracatheter Q8H    tacrolimus  1 mg Oral Q12H     Current Facility-Administered Medications   Medication Last Rate    heparin 1,200 Units/hr (10/07/23 2798)    norepinephrine 0.01 mcg/kg/min (10/07/23 7745)    - MEDICATION INSTRUCTIONS -       Allergies   No Known Allergies    Social History    reports that he has  never smoked. He has never used smokeless tobacco. He reports that he does not currently use alcohol. He reports that he does not use drugs.    Family History   Family History   Problem Relation Age of Onset    Coronary Artery Disease No family hx of     Cardiomyopathy No family hx of        Review of Systems   The comprehensive Review of Systems is negative other than noted in the HPI or here.     Physical Exam   Vital Signs with Ranges  Temp:  [97.9  F (36.6  C)-98.8  F (37.1  C)] 98.4  F (36.9  C)  Pulse:  [59-82] 75  Resp:  [4-47] 20  BP: (102)/(62) 102/62  MAP:  [55 mmHg-94 mmHg] 57 mmHg  Arterial Line BP: ()/(24-72) 87/41  SpO2:  [89 %-100 %] 96 %  Wt Readings from Last 4 Encounters:   10/05/23 93.2 kg (205 lb 7.5 oz)   10/03/23 86.2 kg (190 lb)   10/01/23 87.9 kg (193 lb 11.2 oz)   09/21/23 88.5 kg (195 lb)     I/O last 3 completed shifts:  In: 1854.59 [P.O.:890; I.V.:964.59]  Out: 0       Vitals: /62   Pulse 75   Temp 98.4  F (36.9  C) (Oral)   Resp 20   Ht 1.829 m (6')   Wt 93.2 kg (205 lb 7.5 oz)   SpO2 96%   BMI 27.87 kg/m      General: Alert, oriented, looks tired, in no acute distress  Neck: Normal JVP  CV: Regular rate and rhythm without murmur  Respiratory: Clear to auscultation bilaterally  GI: Distended, ascites present  Neuro: No focal deficits appreciated  Extremities: No peripheral edema bilaterally    Recent Labs   Lab 10/07/23  0431 10/06/23  1530 10/06/23  0905 10/06/23  0533 10/05/23  1611 10/05/23  0251 10/05/23  0245 10/03/23  1638 10/03/23  1638   WBC  --   --   --  6.6  --   --  9.9  --  4.8   HGB  --   --   --  9.5*  --  12.6* 11.4*  --  9.8*   MCV  --   --   --  94  --   --  96  --  97   PLT  --   --   --  117*  --   --  146*  --  93*   INR  --   --   --   --   --   --  1.55*  --   --      --   --  134*  --  135 136  --  137   POTASSIUM 4.2  --   --  4.6  --  3.6 3.8  --  4.5   CHLORIDE 97*  --   --  94*  --   --  90*  --  94*   CO2 25  --   --  23  --   --  24   --  24   BUN 40.9*  --   --  60.5*  --   --  44.5*  --  73.8*   CR 5.45*  --   --  7.03*  --   --  5.77*  --  8.64*   GFRESTIMATED 11*  --   --  8*  --   --  11*  --  7*   ANIONGAP 16*  --   --  17*  --   --  22*  --  19*   KELLY 8.6  --   --  8.9  --   --  9.4  --  9.0   * 139* 145* 152*   < >  --  127*  --  113*   ALBUMIN 3.4*  --   --  3.3*  --   --  3.9   < >  --    PROTTOTAL 6.1*  --   --  6.6  --   --  7.6   < >  --    BILITOTAL 0.6  --   --  0.6  --   --  1.3*   < >  --    ALKPHOS 166*  --   --  213*  --   --  252*   < >  --    ALT 8  --   --  10  --   --  13   < >  --    AST 9  --   --  12  --   --  17   < >  --     < > = values in this interval not displayed.     Recent Labs   Lab Test 12/21/22  1315 11/20/22  1151   CHOL 89 121   HDL 27* 22*   LDL 41 80   TRIG 106 94     Recent Labs   Lab 10/06/23  0533 10/05/23  0251 10/05/23  0245 10/03/23  1638   WBC 6.6  --  9.9 4.8   HGB 9.5* 12.6* 11.4* 9.8*   HCT 29.2* 37* 35.2* 30.7*   MCV 94  --  96 97   *  --  146* 93*     Recent Labs   Lab 10/05/23  0600 10/05/23  0251 10/05/23  0248   PHV 7.37 7.42 7.41   PO2V 29 16* 16*   PCO2V 49 45 45   HCO3V 28 29* 29*     No results for input(s): NTBNPI, NTBNP in the last 168 hours.  No results for input(s): DD in the last 168 hours.  No results for input(s): SED, CRP in the last 168 hours.  Recent Labs   Lab 10/06/23  0533 10/05/23  0245 10/03/23  1638   * 146* 93*     No results for input(s): TSH in the last 168 hours.  No results for input(s): COLOR, APPEARANCE, URINEGLC, URINEBILI, URINEKETONE, SG, UBLD, URINEPH, PROTEIN, UROBILINOGEN, NITRITE, LEUKEST, RBCU, WBCU in the last 168 hours.    Imaging:  Recent Results (from the past 48 hour(s))   US Lower Extremity Venous Duplex Bilateral    Narrative    [facility]  DATE: 10/5/2023    EXAM: BILATERAL LOWER EXTREMITY VENOUS ULTRASOUND    INDICATION: Recent diagnosis PE on anticoagulation.    TECHNIQUE: Duplex imaging was performed utilizing  gray-scale,  compression, augmentation as appropriate, color-flow, Doppler waveform  analysis, and spectral Doppler imaging.    COMPARISON: None..    FINDINGS:     RIGHT LOWER EXTREMITY:   The right common femoral and great saphenous veins are unable to be  assessed due to arterial line in place. The right profunda femoral,  femoral, popliteal, and segmentally visualized calf veins are patent  and compressible with appropriate vascular waveforms. No deep venous  thrombus is identified.    LEFT LOWER EXTREMITY:   The left common femoral, profunda femoral, femoral, popliteal, and  segmentally visualized calf veins are patent and compressible with  appropriate vascular waveforms. No deep venous thrombus is identified.  The great saphenous vein is patent.      Impression    IMPRESSION:   The right CFV and GSV are unable to be assessed due to the existing  arterial line. Exam is otherwise negative for deep vein thrombosis  bilaterally.    MARY JO FAN MD         SYSTEM ID:  [id number]   US Upper Extremity Venous Duplex Right    [facility]  DATE: 10/5/2023    EXAM: RIGHT UPPER EXTREMITY VENOUS ULTRASOUND    INDICATION: Recently diagnosed PE on heparin.     TECHNIQUE: Duplex imaging was performed utilizing gray-scale,  compression, augmentation as appropriate, color-flow, and spectral  Doppler imaging with Doppler waveform analysis.     COMPARISON: None.    FINDINGS:  RIGHT ARM:  The right internal jugular vein is patent and  compressible. The visualized innominate and subclavian veins are  patent with normal phasic waveforms. The right axillary, basilic, and  brachial veins are patent and compressible. Short segment thrombosis  in the right cephalic vein at the antecubital fossa. The right chest  tunnel line is not well evaluated due to acoustic shadowing.      Impression    IMPRESSION:   1. Negative for right upper extremity DVT.  2. Short segment DVT in the right cephalic vein at the  antecubital  fossa, which may be related to prior intravenous access.    MARY JO FAN MD         SYSTEM ID:  [id number]   US Upper Extremity Venous Duplex Left    Narrative    VENOUS DUPLEX ULTRASOUND LEFT UPPER EXTREMITY  10/5/2023 2:57 PM     HISTORY: Patient with bilateral PEs, request made for evaluation of  DVT.    COMPARISON: None.    TECHNIQUE: Color Doppler and spectral waveform analysis performed  throughout the deep veins of the left upper extremity. Patient also  has an AV fistula, therefore color Doppler and spectral waveform  analysis obtained in the inflow brachial artery as well as outflow  fistulized vein.    FINDINGS: The left internal jugular, subclavian, axillary, and  brachial veins demonstrate normal blood flow, compression (where  applicable), and augmentation. Radial and ulnar veins are  compressible. Basilic vein is patent. Total blood flow volume is 1920  mL/min. Shortly beyond the mid to upper portion of the upper arm,  there is an area of narrowing measuring 2.6 mm with velocity of  515/301 cm/sec. More central segments are 4.9-8.8 mm. An area of  irregularity is noted in the upper arm, perhaps a small  pseudoaneurysmal segment measuring 7 x 2 mm.      Impression    IMPRESSION:  1. Negative for DVT in the left upper extremity.  2. Patent AV fistula with good blood flow volume of 1920 mL/min.  Moderate stenosis in the mid to upper outflow cephalic vein with  diameter reduced to 2.6 mm.    ABEBA SIMONS MD         SYSTEM ID:  [id number]   XR Chest Port 1 View    Narrative    EXAM: XR CHEST PORT 1 VIEW  LOCATION: [facility]  DATE: 10/6/2023    INDICATION: hypoxia  COMPARISON: 10/05/2023      Impression    IMPRESSION: Cardiac enlargement. Dual-lumen central line tip right atrium. Bibasilar atelectasis or infiltrate and small effusions.         Medical Decision Making       45 MINUTES SPENT BY ME on the date of service doing chart review, history, exam, documentation  & further activities per the note.

## 2023-10-08 ENCOUNTER — APPOINTMENT (OUTPATIENT)
Dept: PHYSICAL THERAPY | Facility: CLINIC | Age: 58
DRG: 871 | End: 2023-10-08
Attending: INTERNAL MEDICINE
Payer: COMMERCIAL

## 2023-10-08 ENCOUNTER — APPOINTMENT (OUTPATIENT)
Dept: OCCUPATIONAL THERAPY | Facility: CLINIC | Age: 58
DRG: 871 | End: 2023-10-08
Attending: INTERNAL MEDICINE
Payer: COMMERCIAL

## 2023-10-08 LAB
ALBUMIN SERPL BCG-MCNC: 3 G/DL (ref 3.5–5.2)
ALP SERPL-CCNC: 136 U/L (ref 40–129)
ALT SERPL W P-5'-P-CCNC: 7 U/L (ref 0–70)
ANION GAP SERPL CALCULATED.3IONS-SCNC: 14 MMOL/L (ref 7–15)
APTT PPP: 73 SECONDS (ref 22–38)
AST SERPL W P-5'-P-CCNC: 11 U/L (ref 0–45)
BILIRUB SERPL-MCNC: 0.6 MG/DL
BUN SERPL-MCNC: 54.8 MG/DL (ref 8–23)
CALCIUM SERPL-MCNC: 8.8 MG/DL (ref 8.6–10)
CHLORIDE SERPL-SCNC: 96 MMOL/L (ref 98–107)
CHYLO FLD QL: ABNORMAL
CREAT SERPL-MCNC: 6.91 MG/DL (ref 0.67–1.17)
DEPRECATED HCO3 PLAS-SCNC: 24 MMOL/L (ref 22–29)
EGFRCR SERPLBLD CKD-EPI 2021: 9 ML/MIN/1.73M2
ERYTHROCYTE [DISTWIDTH] IN BLOOD BY AUTOMATED COUNT: 14.6 % (ref 10–15)
GLUCOSE BLDC GLUCOMTR-MCNC: 90 MG/DL (ref 70–99)
GLUCOSE SERPL-MCNC: 94 MG/DL (ref 70–99)
HCT VFR BLD AUTO: 28.2 % (ref 40–53)
HGB BLD-MCNC: 9 G/DL (ref 13.3–17.7)
MCH RBC QN AUTO: 31.1 PG (ref 26.5–33)
MCHC RBC AUTO-ENTMCNC: 31.9 G/DL (ref 31.5–36.5)
MCV RBC AUTO: 98 FL (ref 78–100)
PLATELET # BLD AUTO: 106 10E3/UL (ref 150–450)
POTASSIUM SERPL-SCNC: 5.2 MMOL/L (ref 3.4–5.3)
PROT SERPL-MCNC: 6.1 G/DL (ref 6.4–8.3)
RBC # BLD AUTO: 2.89 10E6/UL (ref 4.4–5.9)
SODIUM SERPL-SCNC: 134 MMOL/L (ref 135–145)
TRIGL FLD-MCNC: 425 MG/DL
WBC # BLD AUTO: 6 10E3/UL (ref 4–11)

## 2023-10-08 PROCEDURE — 85027 COMPLETE CBC AUTOMATED: CPT | Performed by: INTERNAL MEDICINE

## 2023-10-08 PROCEDURE — 250N000011 HC RX IP 250 OP 636: Mod: JZ | Performed by: INTERNAL MEDICINE

## 2023-10-08 PROCEDURE — 97116 GAIT TRAINING THERAPY: CPT | Mod: GP

## 2023-10-08 PROCEDURE — 97110 THERAPEUTIC EXERCISES: CPT | Mod: GO

## 2023-10-08 PROCEDURE — 80053 COMPREHEN METABOLIC PANEL: CPT | Performed by: INTERNAL MEDICINE

## 2023-10-08 PROCEDURE — 250N000013 HC RX MED GY IP 250 OP 250 PS 637: Performed by: INTERNAL MEDICINE

## 2023-10-08 PROCEDURE — 97530 THERAPEUTIC ACTIVITIES: CPT | Mod: GP

## 2023-10-08 PROCEDURE — 97166 OT EVAL MOD COMPLEX 45 MIN: CPT | Mod: GO

## 2023-10-08 PROCEDURE — 250N000012 HC RX MED GY IP 250 OP 636 PS 637: Performed by: INTERNAL MEDICINE

## 2023-10-08 PROCEDURE — 99232 SBSQ HOSP IP/OBS MODERATE 35: CPT | Performed by: INTERNAL MEDICINE

## 2023-10-08 PROCEDURE — 94660 CPAP INITIATION&MGMT: CPT

## 2023-10-08 PROCEDURE — 99233 SBSQ HOSP IP/OBS HIGH 50: CPT | Performed by: INTERNAL MEDICINE

## 2023-10-08 PROCEDURE — 85730 THROMBOPLASTIN TIME PARTIAL: CPT | Performed by: INTERNAL MEDICINE

## 2023-10-08 PROCEDURE — 97162 PT EVAL MOD COMPLEX 30 MIN: CPT | Mod: GP

## 2023-10-08 PROCEDURE — 999N000157 HC STATISTIC RCP TIME EA 10 MIN

## 2023-10-08 PROCEDURE — 120N000001 HC R&B MED SURG/OB

## 2023-10-08 PROCEDURE — 97535 SELF CARE MNGMENT TRAINING: CPT | Mod: GO

## 2023-10-08 PROCEDURE — 36415 COLL VENOUS BLD VENIPUNCTURE: CPT | Performed by: INTERNAL MEDICINE

## 2023-10-08 RX ADMIN — GUAIFENESIN 600 MG: 600 TABLET, EXTENDED RELEASE ORAL at 04:46

## 2023-10-08 RX ADMIN — HEPARIN SODIUM 1200 UNITS/HR: 10000 INJECTION, SOLUTION INTRAVENOUS at 02:59

## 2023-10-08 RX ADMIN — TACROLIMUS 1 MG: 1 CAPSULE ORAL at 21:08

## 2023-10-08 RX ADMIN — MIDODRINE HYDROCHLORIDE 15 MG: 5 TABLET ORAL at 11:18

## 2023-10-08 RX ADMIN — GUAIFENESIN 600 MG: 600 TABLET, EXTENDED RELEASE ORAL at 21:08

## 2023-10-08 RX ADMIN — PIPERACILLIN AND TAZOBACTAM 2.25 G: 2; .25 INJECTION, POWDER, FOR SOLUTION INTRAVENOUS at 13:45

## 2023-10-08 RX ADMIN — MIDODRINE HYDROCHLORIDE 15 MG: 5 TABLET ORAL at 18:27

## 2023-10-08 RX ADMIN — SEVELAMER CARBONATE 800 MG: 800 TABLET, FILM COATED ORAL at 17:51

## 2023-10-08 RX ADMIN — AMIODARONE HYDROCHLORIDE 200 MG: 200 TABLET ORAL at 08:03

## 2023-10-08 RX ADMIN — SEVELAMER CARBONATE 800 MG: 800 TABLET, FILM COATED ORAL at 06:46

## 2023-10-08 RX ADMIN — Medication 1 CAPSULE: at 08:03

## 2023-10-08 RX ADMIN — HYDROXYZINE HYDROCHLORIDE 25 MG: 25 TABLET, FILM COATED ORAL at 08:03

## 2023-10-08 RX ADMIN — ACETAMINOPHEN 650 MG: 325 TABLET, FILM COATED ORAL at 17:51

## 2023-10-08 RX ADMIN — OXYCODONE HYDROCHLORIDE AND ACETAMINOPHEN 1 TABLET: 7.5; 325 TABLET ORAL at 15:48

## 2023-10-08 RX ADMIN — TACROLIMUS 1 MG: 1 CAPSULE ORAL at 08:03

## 2023-10-08 RX ADMIN — PANTOPRAZOLE SODIUM 40 MG: 40 TABLET, DELAYED RELEASE ORAL at 06:46

## 2023-10-08 RX ADMIN — SEVELAMER CARBONATE 800 MG: 800 TABLET, FILM COATED ORAL at 21:08

## 2023-10-08 RX ADMIN — LACOSAMIDE 100 MG: 50 TABLET, FILM COATED ORAL at 21:08

## 2023-10-08 RX ADMIN — PIPERACILLIN AND TAZOBACTAM 2.25 G: 2; .25 INJECTION, POWDER, FOR SOLUTION INTRAVENOUS at 21:00

## 2023-10-08 RX ADMIN — OXYCODONE HYDROCHLORIDE AND ACETAMINOPHEN 1 TABLET: 7.5; 325 TABLET ORAL at 08:04

## 2023-10-08 RX ADMIN — MELATONIN TAB 3 MG 3 MG: 3 TAB at 22:02

## 2023-10-08 RX ADMIN — MIDODRINE HYDROCHLORIDE 15 MG: 5 TABLET ORAL at 08:03

## 2023-10-08 RX ADMIN — PIPERACILLIN AND TAZOBACTAM 2.25 G: 2; .25 INJECTION, POWDER, FOR SOLUTION INTRAVENOUS at 03:00

## 2023-10-08 RX ADMIN — LACOSAMIDE 50 MG: 50 TABLET, FILM COATED ORAL at 08:03

## 2023-10-08 RX ADMIN — PIPERACILLIN AND TAZOBACTAM 2.25 G: 2; .25 INJECTION, POWDER, FOR SOLUTION INTRAVENOUS at 07:52

## 2023-10-08 RX ADMIN — SEVELAMER CARBONATE 800 MG: 800 TABLET, FILM COATED ORAL at 11:18

## 2023-10-08 RX ADMIN — AMIODARONE HYDROCHLORIDE 200 MG: 200 TABLET ORAL at 21:08

## 2023-10-08 RX ADMIN — GABAPENTIN 300 MG: 300 CAPSULE ORAL at 22:02

## 2023-10-08 ASSESSMENT — ACTIVITIES OF DAILY LIVING (ADL)
ADLS_ACUITY_SCORE: 36
ADLS_ACUITY_SCORE: 36
ADLS_ACUITY_SCORE: 40
ADLS_ACUITY_SCORE: 35
ADLS_ACUITY_SCORE: 35
ADLS_ACUITY_SCORE: 36
ADLS_ACUITY_SCORE: 40
ADLS_ACUITY_SCORE: 35
ADLS_ACUITY_SCORE: 40
ADLS_ACUITY_SCORE: 40

## 2023-10-08 NOTE — PROGRESS NOTES
[facility]    Medicine Progress Note - Hospitalist Service    Date of Admission:  10/5/2023    Assessment & Plan     Blanco Osborne is a 58 year old male with extensive medical history that includes liver transplant in 2016 due to alcoholic liver disease, now with progressive cirrhosis and ascites, paroxysmal atrial fibrillation, on anticoagulation with Eliquis, diabetes mellitus type 2, previous CVA, hypertension, CHRISTIAN on BiPAP, chronic pericardial effusion, ESRD on hemodialysis who presented on 10/5/2023 with palpitations and chest discomfort.      He was hypotensive and had wide-complex tachycardia felt to be atrial fibrillation with aberrancy.  He failed 2 attempts of emergent cardioversion.  He was started on amiodarone and subsequently converted to sinus rhythm. He is now on po amiodarone    He received IV fluids but remained hypotensive and subsequently received pressors that were discontinued on 10/7. He had severe sepsis likely due to SBP and pneumonia. Blood and ascitic fluid cultures were negative. He was intially on vancomycin and zosyn. Vancomycin was discontinued on 10/8.    Troponins were elevated, felt to be demand ischemia.  Echo showed pericardial effusion without tamponade.  CT C/A/P showed PE and pulmonary infiltrates.  He was started on IV heparin.     Acute hypoxic respiratory failure  - multifactorial, Requiring supplemental oxygen, currently 2 L/min  - continue supplemental oxygen, wean as able    Severe sepsis with septic shock - due to SBP and multifactorial pneumonia   Chronic hypotension, on midodrine  -Is normally on midodrine for chronic hypotension.  Initially presented with septic shock which has now resolved.  Off pressors since 10/7/2023.  Blood pressure currently in normal range  -Continue midodrine for chronic hypotension    End-stage liver disease, s/p liver transplant in 2016, now with cirrhosis of transplanted liver with recurrent ascites and Portal  hypertension  Probable SBP, s/p paracentesis  - peritoneal fluid was turbid with 1119 nucleated cells, no differential available  - culture negative  - on IV zosyn, continue   - On immunosuppression with tacrolimus, continue    Multifocal pneumonia, organism unspecified  - CT scan showed pulmonary infiltrates   - COVID 19/influenza/RSV negative  - blood cultures negative  - unable to provide sputum specimen  - continue IV zosyn. Vancomycin discontinued on 10/8    Pulmonary embolism in segmental and subsegmental pulmonary arteries of left upper lobe, without cor pulmonale -  Short segment  thrombophlebitis in right cephalic vein at antecubital fossa, likely related to prior intravenous access  - normally on eliquis for A fib. Now on IV heparin  - transition to po anticoagulation when able  - Anticoagulation with apixaban was interrupted for at least 2 days during previous hospitalization for pericardiocentesis.  Will transition back to apixaban when no more procedures required   - No DVT in extremities    Chronic pericardial effusion, s/p pericardiocentesis on 9/29.  Drain was removed on 9/30.   - Felt to be due to volume overload due to renal failure and cirrhosis  - Repeat TTE 10/7 showed moderate pericardial effusion, slightly larger than seen on 10/5, with no evidence of tamponade physiology  - Cardiology following.  Have recommended repeat echo on 10/9.  If pericardial effusion continues to enlarge, may need pericardial drain placement    Chronic paroxysmal atrial fibrillation, on anticoagulation with Eliquis  Wide-complex tachycardia on admission felt to be atrial fibrillation with aberrant conduction, failed cardioversion x 2  - converted to NSR with amiodarone  - On amiodarone 200 mg twice daily, continue   - eliquis on hold. Currently on IV heparin, will continue until it is clear he does not need any more procedure     ESRD, on hemodialysis q. Monday, Wednesday, Friday  S/p left AV fistula pseudoaneurysm  repair, 8/2023  - nephrology following. Last dialyzed on Friday. Will dialyze on Monday, 10/9. Most of the fluid is in form of ascities and pericardial effusion which cannot be removed with dialysis  - Has been in left upper extremity as AV fistula infiltrated on 10/6  - continue dialysis per nephrology  -Continue renal multivitamin, sevelamer    Small bilateral pleural effusions  Chronically loculated right pleural effusion  - stable       Generalized anxiety disorder  Restless leg syndrome  -On hydroxyzine and ropinirole      Chronic anemia-due to anemia of chronic disease  -Hemoglobin stable at 9.  Monitor    chronic thrombocytopenia  -Secondary to cirrhosis  -platelet count stable, 106k.  Monitor    Seizure disorder  -On, continue Vimpat    GERD  -Continue PPI    Probable CHRISTIAN  --Per patient's wife, he had a sleep study about 10 years ago but does not have any CPAP or BiPAP at home  -Did use BiPAP last night  -Should undergo sleep study as outpatient after discharge    Previous embolic strokes    Generalized weakness  -Continue PT/OT.  Has started to ambulate.  Still feels weak.    Pain at multiple locations  -Has pain in left upper extremity due to infiltration of AV fistula, pain in right upper extremity due to thrombophlebitis and frequent lab draws.  Also complains of diffuse pain in shoulders and upper back  -continue Tylenol as needed  -IV Dilaudid 0.2 mg every 6 hours as needed and Percocet every 8 hours as needed           Diet: Advance Diet as Tolerated: Regular Diet Adult  Snacks/Supplements Adult: Gelatein Plus; Between Meals    DVT Prophylaxis: IV heparin  Nixon Catheter: Not present  Lines: PRESENT      [REMOVED] CVC Triple Lumen Right Femoral-Site Assessment: WDL  [REMOVED] Arterial Line 10/05/23 Femoral-Site Assessment: WDL  Hemodialysis Vascular Access Right Subclavian-Site Assessment: WDL  Hemodialysis Vascular Access Arteriovenous fistula Left Forearm-Site Assessment: Bruit present;Thrill  present;Tender;Ecchymotic      Cardiac Monitoring: ACTIVE order. Indication: ICU  Code Status: Full Code      Clinically Significant Risk Factors              # Hypoalbuminemia: Lowest albumin = 3 g/dL at 10/8/2023  6:08 AM, will monitor as appropriate     # Thrombocytopenia: Lowest platelets = 106 in last 2 days, will monitor for bleeding     # Hypertension: Noted on problem list        # Overweight: Estimated body mass index is 27.69 kg/m  as calculated from the following:    Height as of this encounter: 1.829 m (6').    Weight as of this encounter: 92.6 kg (204 lb 2.3 oz)., PRESENT ON ADMISSION            Disposition Plan     Expected Discharge Date: 10/09/2023                  Sabina Bustos MD  Hospitalist Service  [facility]  Securely message with [employer] (more info)  Text page via [employer] Paging/Directory   ______________________________________________________________________    Interval History   Patient sitting in bed.  Wife present in room.  Complains of pain in left upper extremity due to AV fistula infiltration on 10/6.  Complains of pain in bilateral shoulders.  Pain in her right upper extremity.  Also reports some shortness of breath which is more than his baseline.  Not wearing oxygen currently.  Reports his breathing improved with paracentesis.  Denies any abdominal pain.  No chest pain.  Vital signs are stable    Physical Exam   Vital Signs: Temp: 97.6  F (36.4  C) Temp src: Oral BP: 126/75 Pulse: 86   Resp: 13 SpO2: 95 % O2 Device: None (Room air) Oxygen Delivery: 2 LPM  Weight: 204 lbs 2.34 oz    Constitutional - awake and alert, resting in bed, in no acute distress  Cardiovascular - regular rate and rhythm, no murmurs, no edema  Pulmonary - lungs are clear to auscultation bilaterally, no wheezing or rhonchi  GI - abdomen is soft, nontender, distended  Integumentary - skin is warm and dry, no rashes or ulcers  Neurological - awake, alert and oriented x3.  Moving all 4  extremities, normal speech, no focal deficits    Medical Decision Making       65 MINUTES SPENT BY ME on the date of service doing chart review, history, exam, documentation & further activities per the note.      Data     I have personally reviewed the following data over the past 24 hrs:    6.0  \   9.0 (L)   / 106 (L)     134 (L) 96 (L) 54.8 (H) /  94; 90   5.2 24 6.91 (H) \     ALT: 7 AST: 11 AP: 136 (H) TBILI: 0.6   ALB: 3.0 (L) TOT PROTEIN: 6.1 (L) LIPASE: N/A     INR:  N/A PTT:  73 (H)   D-dimer:  N/A Fibrinogen:  N/A     Imaging results reviewed over the past 24 hrs:   No results found for this or any previous visit (from the past 24 hour(s)).

## 2023-10-08 NOTE — PROGRESS NOTES
[facility]    Cardiology Consultation - Progress Note     Date of Admission:  10/5/2023  Date of Service: 10/08/23    Reason for Consult   Reason for consult: pericardial effusion, wide complex tachycardia    History of Present Illness   Blanco Osborne is a 58 year old male who was admitted on 10/5/2023 for pulmonary embolism, sepsis and wide complex tachycardia. Medical comorbidities include paroxysmal atrial fibrillation on chronic anticoagulation, chronic pericardial effusion, end stage liver disease s/p liver transplant 2016, on chronic immunosuppression, ESRD on dialysis.    Yesterday patient had repeat transthoracic echocardiogram revealing moderate circumferential pericardial effusion that subjectively does appear slightly larger than prior TTE on 10/5/2023; I do note the patient recently had enlarging pericardial effusion and underwent pericardiocentesis with pericardial drain placement approximately 1 week ago.    Patient off vasopressors as of this morning, vital stable.  This a.m. patient reports overall feeling better.  He continues to endorse right upper extremity pain at site of AV fistula related to IV infiltration as well as right arm pain related to IV stick.  Denies dyspnea or lightheadedness.      Assessment & Plan   #1 Pericardial Effusion, chronic  #2 Atrial Fibrillation, paroxysmal, now sinus rhythm  #3 ESRD on HD  #4 Pulmonary Embolism  #5 Chronic Anticoagulation    In summary, Mr. Osborne presents with pulmonary embolism and sepsis.  He continues on heparin for his pulmonary embolism and antibiotics for sepsis. He had previously demonstrated wide-complex tachycardia perhaps suggestive of atrial fibrillation with aberrant conduction.  It would be reasonable to continue oral amiodarone at this point, currently dosed at 200 mg twice daily while he continues to heal from his acute stressors which I suspect were the etiology of his arrhythmia.      Yesterday repeat TTE  revealed moderate pericardial effusion, perhaps slightly larger from prior, no signs of tamponade physiology.  I do worry with the patient's recent history that he will have ongoing enlargement of his pericardial effusion.  I recommend repeat TTE tomorrow, which I will order, for which if this shows progressive enlargement of his effusion he would very much benefit not only from pericardial drain placement, but also further work-up for etiology of his recurrent pericardial effusion as well as consideration for pericardial window/pericardiectomy as he has had very high risk of having recurrent effusions down the road.    Plan:  Continue amiodarone 200 mg twice daily for now, will consider de-escalation to 200 mg daily after amiodarone load of at least 5 g  Repeat transthoracic echocardiogram tomorrow to evaluate size of pericardial effusion.  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)    Acquired immunocompromised state (H24)    Sepsis due to Streptococcus pneumoniae with acute hypoxic respiratory failure (H)    Encounter for therapeutic drug monitoring    Aspergillus pneumonia (H)    Embolic stroke (H)    Hyperammonemia (H24)    Pneumothorax    Critical illness myopathy    Pneumothorax on left    Encephalopathy    PEA (Pulseless electrical activity) (H)    Seizures (H)    Hypotension, unspecified hypotension type    Acute on chronic respiratory failure with hypoxia (H)    History of sudden cardiac  arrest, PEA    ESRD (end stage renal disease) on dialysis (H)    Anemia of chronic disease due to ESRD, cirrhosis and hypersplenism    Parapneumonic effusion    Pleural effusion    Abnormal liver function test    Colon adenoma    Dyslipidemia    Gastroesophageal reflux disease with esophagitis    CHRISTIAN on CPAP    Umbilical hernia    Dialysis AV fistula malfunction, sequela    Pericardial effusion    Pulmonary infiltrates    Wide-complex tachycardia    Elevated troponin    Atrial fibrillation with rapid ventricular response (H)    Severe sepsis (H)    Multiple subsegmental pulmonary emboli without acute cor pulmonale (H)       Past Medical History   I have reviewed this patient's medical history and updated it with pertinent information if needed.   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    Cancer (H) 2023    Squamous Cell Cancer- Since resolved    Cirrhosis of liver with ascites (H) 02/11/2016    Coagulopathy (H24)     Critical illness myopathy     Dialysis patient (H24)     DIALYSIS 3X/ WEEK    Encephalopathy     ESRD (end stage renal disease) on dialysis (H)     Gastroesophageal reflux disease     H/O alcohol abuse     History of blood transfusion     Hyperammonemia (H24)     Hyperlipidemia     Hypertension     Patients states that HTN has been resolved    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    Insomnia     Lactic acidosis 11/12/2022    Liver replaced by transplant (H) 09/20/2016    NAFLD (nonalcoholic fatty liver disease) 02/11/2016    CHRISTIAN on CPAP     Parainfluenza type 1 infection 11/19/2022    Parapneumonic effusion     Pleural effusion     Pneumothorax on left 12/16/2022    Respiratory acidosis 11/12/2022    Respiratory arrest (H) 11/12/2022     Restless legs syndrome (RLS)     SBP (spontaneous bacterial peritonitis) (H)     MNGI    Seizures (H) 12/2022    Sepsis due to Streptococcus pneumoniae with acute hypoxic respiratory failure (H) 11/19/2022    Stroke, embolic (H) 11/20/2022    Sudden cardiac arrest (H) 12/29/2022    PEA- 2 min of CPR    Tubular adenoma 10/2019    Large cecal adenoma- due for surveillance colonoscopy in 3 years (10/2022)       Past Surgical History   I have reviewed this patient's surgical history and updated it with pertinent information if needed.  Past Surgical History:   Procedure Laterality Date    APPENDECTOMY      BENCH LIVER N/A 09/20/2016    Procedure: BENCH LIVER;  Surgeon: Enoc Crews MD;  Location: [facility] OR    BRONCHOSCOPY FLEXIBLE AND RIGID N/A 12/20/2022    Procedure: BRONCHOSCOPY;  Surgeon: Alena Valenzuela MD;  Location:  GI    COLONOSCOPY  08/06/2013    repeat in 2018    COLONOSCOPY N/A 10/04/2019    Procedure: COLONOSCOPY, WITH POLYPECTOMY AND BIOPSY;  Surgeon: Go Chong MD;  Location: [facility]    CREATE FISTULA ARTERIOVENOUS UPPER EXTREMITY Left 03/21/2023    Procedure: LEFT UPPER EXTREMITY ARTERIOVENOUS FISTULA CREATION. LIGATION OF COMPETING VASCULAR BRANCHES- LEFT;  Surgeon: Deejay Mcneil MD;  Location: [facility] OR    CV PERICARDIOCENTESIS N/A 9/29/2023    Procedure: Pericardiocentesis;  Surgeon: Barry Mckeon MD;  Location:  [facility]    HERNIA REPAIR      IR CHEST TUBE PLACEMENT NON-TUNNELED RIGHT  12/12/2022    IR CVC TUNNEL PLACEMENT > 5 YRS OF AGE  12/06/2022    IR DIALYSIS FISTULOGRAM LEFT  07/13/2023    IR GASTROSTOMY TUBE CHANGE  02/08/2023    IR GASTROSTOMY TUBE PERCUTANEOUS PLCMNT  11/29/2022    IR PARACENTESIS  11/29/2022    IR PARACENTESIS  12/01/2022    IR THORACENTESIS  12/06/2022    IR THORACENTESIS  09/01/2020    IR THORACENTESIS  08/10/2020    IR TRANSCATHETER BIOPSY  12/01/2022    REVISION FISTULA ARTERIOVENOUS UPPER EXTREMITY Left 8/15/2023     Procedure: 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 2; OUTFLOW REVISION CEPHALIC TO BRACHIAL VEIN;  Surgeon: Deejay Mcneil MD;  Location: [facility] OR    TRACHEOSTOMY N/A 2022    Procedure: TRACHEOSTOMY;  Surgeon: Nilesh Jackson MD;  Location: [facility] OR    TRANSPLANT LIVER RECIPIENT  DONOR N/A 2016    Procedure: TRANSPLANT LIVER RECIPIENT  DONOR;  Surgeon: Enoc Crews MD;  Location: [facility] OR       Prior to Admission Medications   Prior to Admission Medications   Prescriptions Last Dose Informant Patient Reported? Taking?   Lacosamide (VIMPAT) 100 MG TABS tablet 10/4/2023 Spouse/Significant Other No Yes   Sig: Take 1 tablet (100 mg) by mouth every evening   acetaminophen (TYLENOL) 325 MG tablet prn at prn Spouse/Significant Other No Yes   Sig: Take 2 tablets (650 mg) by mouth every 4 hours as needed for other (For optimal non-opioid multimodal pain management to improve pain control.)   apixaban ANTICOAGULANT (ELIQUIS) 5 MG tablet 10/4/2023 Spouse/Significant Other No Yes   Sig: Take 1 tablet (5 mg) by mouth 2 times daily for 90 days   gabapentin (NEURONTIN) 100 MG capsule 10/4/2023 Spouse/Significant Other No Yes   Sig: Take 1 capsule (100 mg) by mouth daily   hydrOXYzine (ATARAX) 25 MG tablet prn at prn Spouse/Significant Other No Yes   Sig: Take 1 tablet (25 mg) by mouth 3 times daily as needed for itching   lacosamide (VIMPAT) 50 MG TABS tablet 10/4/2023 Spouse/Significant Other Yes Yes   Sig: Take 50 mg by mouth every morning   melatonin 3 MG tablet 10/4/2023 Spouse/Significant Other No Yes   Sig: Take 1 tablet (3 mg) by mouth At Bedtime   midodrine (PROAMATINE) 10 MG tablet 10/4/2023 Spouse/Significant Other No Yes   Sig: Take 1 tab 3xDay, during patient's dialysis days , Monday, Wednesday, Friday, patient takes 2 extra Midodrine one hour before dialysis, takes 1 more Midodrine when he gets there, and takes 1 more tablet during dialysis, this is an addition to  patient's scheduled doses   multivitamin RENAL (TRIPHROCAPS) 1 capsule capsule 10/4/2023 Spouse/Significant Other No Yes   Sig: Take 1 capsule by mouth daily   oxyCODONE (ROXICODONE) 5 MG tablet prn at prn Spouse/Significant Other No Yes   Sig: Take 1 tablet (5 mg) by mouth every 4 hours as needed for moderate pain   rOPINIRole (REQUIP) 0.5 MG tablet prn at prn Spouse/Significant Other Yes Yes   Sig: Take 0.5 mg by mouth 2 times daily as needed (restless legs)   sevelamer carbonate (RENVELA) 800 MG tablet 10/4/2023 Spouse/Significant Other Yes Yes   Sig: Take 800 mg by mouth 4 times daily With meals and snacks   tacrolimus (GENERIC EQUIVALENT) 1 MG capsule 10/4/2023 Spouse/Significant Other No Yes   Sig: Take 1 capsule (1 mg) by mouth every 12 hours      Facility-Administered Medications: None       Current Facility-Administered Medications   Medication Dose Route Frequency    amiodarone  200 mg Oral BID    gabapentin  300 mg Oral At Bedtime    guaiFENesin  600 mg Oral BID    Lacosamide  100 mg Oral QPM    lacosamide  50 mg Oral QAM    lidocaine (buffered or not buffered)  5 mL Intradermal Once    melatonin  3 mg Oral At Bedtime    midodrine  15 mg Oral TID w/meals    multivitamin RENAL  1 capsule Oral Daily    pantoprazole  40 mg Oral QAM AC    piperacillin-tazobactam  2.25 g Intravenous Q6H    sevelamer carbonate  800 mg Oral 4x Daily    sodium chloride (PF)  3 mL Intracatheter Q8H    tacrolimus  1 mg Oral Q12H     Current Facility-Administered Medications   Medication Last Rate    heparin 1,200 Units/hr (10/08/23 0259)    norepinephrine Stopped (10/07/23 1530)    - MEDICATION INSTRUCTIONS -       Allergies   No Known Allergies    Social History    reports that he has never smoked. He has never used smokeless tobacco. He reports that he does not currently use alcohol. He reports that he does not use drugs.    Family History   Family History   Problem Relation Age of Onset    Coronary Artery Disease No family hx of      Cardiomyopathy No family hx of        Review of Systems   The comprehensive Review of Systems is negative other than noted in the HPI or here.     Physical Exam   Vital Signs with Ranges  Temp:  [96.2  F (35.7  C)-98.6  F (37  C)] 96.2  F (35.7  C)  Pulse:  [65-87] 79  Resp:  [6-41] 31  BP: ()/(43-83) 112/71  MAP:  [59 mmHg-98 mmHg] 95 mmHg  Arterial Line BP: ()/(46-75) 135/72  FiO2 (%):  [30 %] 30 %  SpO2:  [88 %-100 %] 100 %  Wt Readings from Last 4 Encounters:   10/08/23 92.6 kg (204 lb 2.3 oz)   10/03/23 86.2 kg (190 lb)   10/01/23 87.9 kg (193 lb 11.2 oz)   09/21/23 88.5 kg (195 lb)     I/O last 3 completed shifts:  In: 1593.19 [P.O.:960; I.V.:633.19]  Out: -       Vitals: /71   Pulse 79   Temp (!) 96.2  F (35.7  C) (Axillary)   Resp (!) 31   Ht 1.829 m (6')   Wt 92.6 kg (204 lb 2.3 oz)   SpO2 100%   BMI 27.69 kg/m      General: Alert, oriented, in no acute distress  Neck: Normal JVP  CV: Regular rate and rhythm without murmur  Respiratory: Clear to auscultation bilaterally  GI: Distended, ascites present  Neuro: No focal deficits appreciated  Extremities: No peripheral edema bilaterally    Recent Labs   Lab 10/08/23  0608 10/07/23  2230 10/07/23  0743 10/07/23  0431 10/06/23  0905 10/06/23  0533 10/05/23  1611 10/05/23  0251 10/05/23  0245   WBC 6.0  --   --   --   --  6.6  --   --  9.9   HGB 9.0*  --   --   --   --  9.5*  --  12.6* 11.4*   MCV 98  --   --   --   --  94  --   --  96   *  --   --   --   --  117*  --   --  146*   INR  --   --   --   --   --   --   --   --  1.55*   *  --   --  138  --  134*  --  135 136   POTASSIUM 5.2  --   --  4.2  --  4.6  --  3.6 3.8   CHLORIDE 96*  --   --  97*  --  94*  --   --  90*   CO2 24  --   --  25  --  23  --   --  24   BUN 54.8*  --   --  40.9*  --  60.5*  --   --  44.5*   CR 6.91*  --   --  5.45*  --  7.03*  --   --  5.77*   GFRESTIMATED 9*  --   --  11*  --  8*  --   --  11*   ANIONGAP 14  --   --  16*  --  17*  --   --   22*   KELLY 8.8  --   --  8.6  --  8.9  --   --  9.4   GLC 90  94 145*   < > 136*   < > 152*   < >  --  127*   ALBUMIN 3.0*  --   --  3.4*  --  3.3*  --   --  3.9   PROTTOTAL 6.1*  --   --  6.1*  --  6.6  --   --  7.6   BILITOTAL 0.6  --   --  0.6  --  0.6  --   --  1.3*   ALKPHOS 136*  --   --  166*  --  213*  --   --  252*   ALT 7  --   --  8  --  10  --   --  13   AST 11  --   --  9  --  12  --   --  17    < > = values in this interval not displayed.       Recent Labs   Lab Test 12/21/22  1315 11/20/22  1151   CHOL 89 121   HDL 27* 22*   LDL 41 80   TRIG 106 94     Recent Labs   Lab 10/08/23  0608 10/06/23  0533 10/05/23  0251 10/05/23  0245   WBC 6.0 6.6  --  9.9   HGB 9.0* 9.5* 12.6* 11.4*   HCT 28.2* 29.2* 37* 35.2*   MCV 98 94  --  96   * 117*  --  146*       Recent Labs   Lab 10/05/23  0600 10/05/23  0251 10/05/23  0248   PHV 7.37 7.42 7.41   PO2V 29 16* 16*   PCO2V 49 45 45   HCO3V 28 29* 29*       No results for input(s): NTBNPI, NTBNP in the last 168 hours.  No results for input(s): DD in the last 168 hours.  No results for input(s): SED, CRP in the last 168 hours.  Recent Labs   Lab 10/08/23  0608 10/06/23  0533 10/05/23  0245   * 117* 146*       No results for input(s): TSH in the last 168 hours.  No results for input(s): COLOR, APPEARANCE, URINEGLC, URINEBILI, URINEKETONE, SG, UBLD, URINEPH, PROTEIN, UROBILINOGEN, NITRITE, LEUKEST, RBCU, WBCU in the last 168 hours.    Imaging:  Recent Results (from the past 48 hour(s))   US Lower Extremity Venous Duplex Bilateral    Narrative    [facility]  DATE: 10/5/2023    EXAM: BILATERAL LOWER EXTREMITY VENOUS ULTRASOUND    INDICATION: Recent diagnosis PE on anticoagulation.    TECHNIQUE: Duplex imaging was performed utilizing gray-scale,  compression, augmentation as appropriate, color-flow, Doppler waveform  analysis, and spectral Doppler imaging.    COMPARISON: None..    FINDINGS:     RIGHT LOWER EXTREMITY:   The right common femoral and  great saphenous veins are unable to be  assessed due to arterial line in place. The right profunda femoral,  femoral, popliteal, and segmentally visualized calf veins are patent  and compressible with appropriate vascular waveforms. No deep venous  thrombus is identified.    LEFT LOWER EXTREMITY:   The left common femoral, profunda femoral, femoral, popliteal, and  segmentally visualized calf veins are patent and compressible with  appropriate vascular waveforms. No deep venous thrombus is identified.  The great saphenous vein is patent.      Impression    IMPRESSION:   The right CFV and GSV are unable to be assessed due to the existing  arterial line. Exam is otherwise negative for deep vein thrombosis  bilaterally.    MARY JO FAN MD         SYSTEM ID:  [id number]   US Upper Extremity Venous Duplex Right    Narrative    [facility]  DATE: 10/5/2023    EXAM: RIGHT UPPER EXTREMITY VENOUS ULTRASOUND    INDICATION: Recently diagnosed PE on heparin.     TECHNIQUE: Duplex imaging was performed utilizing gray-scale,  compression, augmentation as appropriate, color-flow, and spectral  Doppler imaging with Doppler waveform analysis.     COMPARISON: None.    FINDINGS:  RIGHT ARM:  The right internal jugular vein is patent and  compressible. The visualized innominate and subclavian veins are  patent with normal phasic waveforms. The right axillary, basilic, and  brachial veins are patent and compressible. Short segment thrombosis  in the right cephalic vein at the antecubital fossa. The right chest  tunnel line is not well evaluated due to acoustic shadowing.      Impression    IMPRESSION:   1. Negative for right upper extremity DVT.  2. Short segment DVT in the right cephalic vein at the antecubital  fossa, which may be related to prior intravenous access.    MARY JO FAN MD         SYSTEM ID:  [id number]   US Upper Extremity Venous Duplex Left    Narrative    VENOUS DUPLEX ULTRASOUND LEFT UPPER EXTREMITY   10/5/2023 2:57 PM     HISTORY: Patient with bilateral PEs, request made for evaluation of  DVT.    COMPARISON: None.    TECHNIQUE: Color Doppler and spectral waveform analysis performed  throughout the deep veins of the left upper extremity. Patient also  has an AV fistula, therefore color Doppler and spectral waveform  analysis obtained in the inflow brachial artery as well as outflow  fistulized vein.    FINDINGS: The left internal jugular, subclavian, axillary, and  brachial veins demonstrate normal blood flow, compression (where  applicable), and augmentation. Radial and ulnar veins are  compressible. Basilic vein is patent. Total blood flow volume is 1920  mL/min. Shortly beyond the mid to upper portion of the upper arm,  there is an area of narrowing measuring 2.6 mm with velocity of  515/301 cm/sec. More central segments are 4.9-8.8 mm. An area of  irregularity is noted in the upper arm, perhaps a small  pseudoaneurysmal segment measuring 7 x 2 mm.      Impression    IMPRESSION:  1. Negative for DVT in the left upper extremity.  2. Patent AV fistula with good blood flow volume of 1920 mL/min.  Moderate stenosis in the mid to upper outflow cephalic vein with  diameter reduced to 2.6 mm.    ABEBA SIMONS MD         SYSTEM ID:  [id number]   XR Chest Port 1 View    Narrative    EXAM: XR CHEST PORT 1 VIEW  LOCATION: [facility]  DATE: 10/6/2023    INDICATION: hypoxia  COMPARISON: 10/05/2023      Impression    IMPRESSION: Cardiac enlargement. Dual-lumen central line tip right atrium. Bibasilar atelectasis or infiltrate and small effusions.         Medical Decision Making       45 MINUTES SPENT BY ME on the date of service doing chart review, history, exam, documentation & further activities per the note.

## 2023-10-08 NOTE — PROGRESS NOTES
10/08/23 1400   Appointment Info   Signing Clinician's Name / Credentials (OT) Marlene Ruiz, OTR/L   Living Environment   People in Home spouse   Current Living Arrangements condominium   Home Accessibility no concerns   Transportation Anticipated car, drives self;family or friend will provide   Living Environment Comments Uses tub/shower w/grab bars, also has walk-in shower avail in condo if needed.   Self-Care   Usual Activity Tolerance good   Current Activity Tolerance fair   Regular Exercise Yes   Activity/Exercise Type walking;other (see comments)  (BUE ex's and B hand ex's daily)   Equipment Currently Used at Home   (Owns RW but no longer using pta)   Fall history within last six months yes   Number of times patient has fallen within last six months 2  (was right after d/c from hospital last stay and none since)   Activity/Exercise/Self-Care Comment Per pt/spouse: indep dressing, grooming, has HHA assist with bathing but almost done with that assistance, indep meds, driving. Spouse does cooking, laundry, cleaning. Pt ambulates without AD.   General Information   Onset of Illness/Injury or Date of Surgery 10/05/23   Referring Physician Laura Fernandes MD   Patient/Family Therapy Goal Statement (OT) to regain strength, walk   Additional Occupational Profile Info/Pertinent History of Current Problem Per chart: Blanco Osborne is a 58 year old male who was admitted on 10/5/2023 for pulmonary embolism, sepsis and wide complex tachycardia. Medical comorbidities include paroxysmal atrial fibrillation on chronic anticoagulation, chronic pericardial effusion, end stage liver disease s/p liver transplant 2016, on chronic immunosuppression, ESRD on dialysis.   Existing Precautions/Restrictions fall   Cognitive Status Examination   Orientation Status orientation to person, place and time   Affect/Mental Status (Cognitive) WNL   Follows Commands WFL;follows one-step commands;over 90% accuracy   Visual  "Perception   Visual Impairment/Limitations WFL;corrective lenses for reading   Pain Assessment   Patient Currently in Pain No   Range of Motion Comprehensive   General Range of Motion bilateral upper extremity ROM WFL   Comment, General Range of Motion Limited by lines in ICU but able to achieve B valeria flex to at least 160-70o actively   Strength Comprehensive (MMT)   Comment, General Manual Muscle Testing (MMT) Assessment R valeria flex 4/5, L valeria flex 4-/5   Coordination   Coordination Comments Pt able to manipulate juice container, phone   Bed Mobility   Comment (Bed Mobility) Min A sup<>sit   Transfers   Transfer Comments declined out of bed activity as head just returned to bed and nursing hanging antibiotic and giving pain meds   Balance   Balance Comments Not assessed - per PT assessment from earlier, pt very unsteady without an AD, Min A with FWW for standing   Activities of Daily Living   BADL Assessment/Intervention lower body dressing;upper body dressing;grooming;toileting   Upper Body Dressing Assessment/Training   [place] Level (Upper Body Dressing) unable to perform  (pt reports \"I'm too weak\")   Lower Body Dressing Assessment/Training   [place] Level (Lower Body Dressing) unable to perform  (pt reports \"too weak to attempt\")   Grooming Assessment/Training   Position (Grooming) sitting up in bed;supported sitting   [place] Level (Grooming) set up   Toileting   Comment, (Toileting) Unable to tolerate out of bed activity, currently dependent   [place] Level (Toileting) unable to perform   Clinical Impression   Criteria for Skilled Therapeutic Interventions Met (OT) Yes, treatment indicated   OT Diagnosis decline in ADL/IADL performance   OT Problem List-Impairments impacting ADL problems related to;activity tolerance impaired;balance;cognition;strength;pain   Assessment of Occupational Performance 5 or more Performance Deficits   Identified Performance Deficits functional mobility, " dressing, toileting, grooming, bathing   Planned Therapy Interventions (OT) ADL retraining;IADL retraining;cognition;strengthening;transfer training;home program guidelines   Clinical Decision Making Complexity (OT) moderate complexity   Risk & Benefits of therapy have been explained evaluation/treatment results reviewed;care plan/treatment goals reviewed;risks/benefits reviewed;current/potential barriers reviewed;participants voiced agreement with care plan;patient;participants included;spouse/significant other   OT Total Evaluation Time   OT Eval, Moderate Complexity Minutes (16352) 17   OT Goals   Therapy Frequency (OT) Daily   OT Predicted Duration/Target Date for Goal Attainment 10/13/23   OT Goals Hygiene/Grooming;Lower Body Dressing;Toilet Transfer/Toileting;Cognition;OT Goal 1   OT: Hygiene/Grooming independent;while standing   OT: Lower Body Dressing Modified independent;including set-up/clothing retrieval   OT: Toilet Transfer/Toileting Modified independent;toilet transfer;cleaning and garment management   OT: Cognitive Patient/caregiver will verbalize understanding of cognitive assessment results/recommendations as needed for safe discharge planning   OT: Goal 1 Patient will be indep in HEP to maximize strength and coordination in BUEs for indep with ADL/IADL tasks.   Interventions   Interventions Quick Adds Self-Care/Home Management;Therapeutic Procedures/Exercise;Therapeutic Activity   Self-Care/Home Management   Self-Care/Home Mgmt/ADL, Compensatory, Meal Prep Minutes (75784) 10   Treatment Detail/Skilled Intervention Pt awake, spouse present however reported he had just returned to bed, and nurse was hanging antibiotic.  Pt oriented X 4, also to POTUS, VPOTUS, recent events and followed directives consistently. Very talkative requiring redirection at times to continue on task.  Pt able to drink from juice cup using R hand without spilling, required A to open the container, in supported sitting in bed.  "  Therapeutic Procedures/Exercise   Therapeutic Procedure: strength, endurance, ROM, flexibillity minutes (72449) 8   Treatment Detail/Skilled Intervention Instructed patient in AROM ex's to prevent loss of strength in BUE's including B valeria flex, abduction and horizontal abduction/adduction. Pt tires quickly so instead of 10 continuous reps each suggested he could try 5 each side then repeat 1-2x so doing 10-15 reps at a time maybe 2-3X daily. Pt agreed \"to try\".   OT Discharge Planning   OT Plan EOB G/H, advance to standing, bed to chair/commode transfers, BUE exercises, do SLUMS   OT Discharge Recommendation (DC Rec) [facility] patient will benefit from intensive, interdisciplinary therapy.  Anticipate will be able to tolerate 3 hours of therapy per day   OT Rationale for DC Rec Pt made good gains following last hospitalization and is motivated to return to [facility]. Has good home support. Will benefit from continued daily OT in [facility] setting to advance indep with ADL, IADL prior to return home.   OT Brief overview of current status A & O x 4, BUE WFL but weak, impaired tolerance for out of bed activity.   Total Session Time   Timed Code Treatment Minutes 18   Total Session Time (sum of timed and untimed services) 35     "

## 2023-10-08 NOTE — PROGRESS NOTES
Transfer to [facility], report given to PETER Reddy. VSS stable at time of transfer, wife assist with belongings. Transport via wheelchair with flyer RN.

## 2023-10-08 NOTE — PROGRESS NOTES
10/08/23 0935   Appointment Info   Signing Clinician's Name / Credentials (PT) Anjana Lawton, PT   Living Environment   People in Home spouse   Current Living Arrangements condominium   Home Accessibility no concerns   Transportation Anticipated car, drives self;family or friend will provide   Self-Care   Usual Activity Tolerance good   Current Activity Tolerance fair   Equipment Currently Used at Home walker, rolling   Fall history within last six months yes   Number of times patient has fallen within last six months 2   Activity/Exercise/Self-Care Comment Reports he's been doing well since his last hospitalization-- has since been ambulatory without device. 2 falls happened right after d/c from hospital. Family supportive.   General Information   Onset of Illness/Injury or Date of Surgery 10/05/23   Referring Physician Laura Fernandes MD   Patient/Family Therapy Goals Statement (PT) to get better, to walk   Pertinent History of Current Problem (include personal factors and/or comorbidities that impact the POC) Per chart: Blanco Osborne is a 58 year old male who was admitted on 10/5/2023 for pulmonary embolism, sepsis and wide complex tachycardia. Medical comorbidities include paroxysmal atrial fibrillation on chronic anticoagulation, chronic pericardial effusion, end stage liver disease s/p liver transplant 2016, on chronic immunosuppression, ESRD on dialysis.   Existing Precautions/Restrictions fall   Weight-Bearing Status - LLE full weight-bearing   Weight-Bearing Status - RLE full weight-bearing   General Observations soft BPs   Cognition   Affect/Mental Status (Cognition) WFL   Cognitive Status Comments eager to participate in mobility   Pain Assessment   Patient Currently in Pain No  (tolerable)   Integumentary/Edema   Integumentary/Edema Comments Paracentesis site w/ some drainage   Posture    Posture Forward head position   Range of Motion (ROM)   Range of Motion ROM is WFL   Strength (Manual  Muscle Testing)   Strength Comments fatigues quickly   Bed Mobility   Comment, (Bed Mobility) CGA seated to supine   Transfers   Comment, (Transfers) min assist sit<>stand with FWW; mod assist at first without a device   Gait/Stairs (Locomotion)   [place] Level (Gait) minimum assist (75% patient effort)   Assistive Device (Gait) walker, front-wheeled   Distance in Feet 20   Distance in Feet (Gait) 80ft x 2   Comment, (Gait/Stairs) unsteady but no overt LOB with FWW; slow pace, tolerated well   Balance   Balance Comments needs FWW and min assist in standing; very unsteady without device   Clinical Impression   Criteria for Skilled Therapeutic Intervention Yes, treatment indicated   PT Diagnosis (PT) impaired functional mobility   Influenced by the following impairments decreased strength, balance, activity tolerance, coordination   Functional limitations due to impairments bed mobility, transfers, ambulation   Clinical Presentation (PT Evaluation Complexity) Evolving/Changing   Clinical Presentation Rationale clinical judgement   Clinical Decision Making (Complexity) moderate complexity   Planned Therapy Interventions (PT) balance training;bed mobility training;gait training;home exercise program;motor coordination training;neuromuscular re-education;patient/family education;stair training;strengthening;transfer training   Anticipated Equipment Needs at Discharge (PT) walker, rolling   Risk & Benefits of therapy have been explained evaluation/treatment results reviewed;care plan/treatment goals reviewed;risks/benefits reviewed;current/potential barriers reviewed;participants voiced agreement with care plan;participants included;patient;sibling   PT Total Evaluation Time   PT Eval, Moderate Complexity Minutes (36698) 15   Physical Therapy Goals   PT Frequency Daily   PT Predicted Duration/Target Date for Goal Attainment 10/15/23   PT Goals Bed Mobility;Transfers;Gait   PT: Bed Mobility Modified independent   PT:  Transfers Supervision/stand-by assist;Sit to/from stand;Bed to/from chair;Assistive device   PT: Gait Supervision/stand-by assist;Rolling walker;Greater than 200 feet   Interventions   Interventions Quick Adds Gait Training;Therapeutic Activity   Therapeutic Activity   Therapeutic Activities: dynamic activities to improve functional performance Minutes (79883) 10   Treatment Detail/Skilled Intervention From chair, stood with mod assist to don underwear, unsteady in standing but tolerated well. Soft BPs, RN tony'ed activity as tolerated. Back to bed at end of session, SBA-CGA to lie down and assist to adjust to midline.   Gait Training   Gait Training Minutes (22493) 18   Symptoms Noted During/After Treatment (Gait Training) fatigue   Treatment Detail/Skilled Intervention Pt eager to ambulate. Ambulation with min assist to turn and veers R. Slow pace, tolerated well. Second assist for lines/tubes in setting of ICU, family to assist with chair follow. Pt w/ 98% O2 on room air. Seated rest break in between bouts; BP improved with mobility. Pt reports mild dizziness at end of ambulation, but relatively tolerable.   [place] Level (Gait Training) minimum assist (75% patient effort)   Physical Assistance Level (Gait Training) 1 person + 1 person to manage equipment   Weight Bearing (Gait Training) full weight-bearing   Assistive Device (Gait Training) rolling walker   PT Discharge Planning   PT Plan progress ambulation w/ FWW, chair follow and BP checks; strengthening   PT Discharge Recommendation (DC Rec) Acute [facility] patient will benefit from intensive, interdisciplinary therapy.  Anticipate will be able to tolerate 3 hours of therapy per day   PT Rationale for DC Rec Patient is highly motivated to return to [facility], has been indepednent at home without a device. Will benefit from intensive rehab to address safety and independence prior to returning home.   PT Brief overview of current status min assist  ambulation with FWW x 80ft   Total Session Time   Timed Code Treatment Minutes 28   Total Session Time (sum of timed and untimed services) 43

## 2023-10-08 NOTE — PROGRESS NOTES
Pt VSS on 2L NC and later transitioned to Bipap overnight. Pt initially very anxious about trying bipap, but was able to sleep most of the night with it on. Pt complaints of of pain early in the night, relieved by PRN percocet. Arterial line and Right femoral central line removed. Right groin sight soft with no evidence of hematoma formation, however, pt complains of some tenderness in the area. Pt remains anuric. No BM overnight.

## 2023-10-08 NOTE — PROGRESS NOTES
Renal Medicine Progress Note            Assessment/Plan:     1.  ESKD. MWF HD. No UF on HD yesterday. Volume excess may be only in abd, pericardial spaces and not easily amenable to removal with HD.    Planned next HD Monday 10/9 unless some urgent indication over the weekend.     Will plan to rest left AVF with infiltration 10//6 and continue CVC for now.     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  Sepsis syndrome.  Now off pressors.       3.  PE.  Pt is on a heparin gtt.       4.  Anemia: 9.0 today.      5.  FEN.  sodium 134, potassium 5.2. bicarb 24.     Plan/Recs:  1) Plan HD tomorrow via CVC, attempt some ultrafiltration  2) if inadequate UF, may need additional paracentesis    Torsten Montaño DO  [facility]  Office: [phone]  Cell: [phone]        Interval History:      TTE yesterday with moderate pericardial effusion. Midodrine started 15mg TID.   Off levophed. BP's 83 to 112 systolic since midnight.     Weight today 92.6kg. Remains anuric. 10/7 input 1553 ml's.     Possible slight aspiration reported. He feels volume up, abdomen distended.          Medications and Allergies:      amiodarone  200 mg Oral BID    gabapentin  300 mg Oral At Bedtime    guaiFENesin  600 mg Oral BID    Lacosamide  100 mg Oral QPM    lacosamide  50 mg Oral QAM    lidocaine (buffered or not buffered)  5 mL Intradermal Once    melatonin  3 mg Oral At Bedtime    midodrine  15 mg Oral TID w/meals    multivitamin RENAL  1 capsule Oral Daily    pantoprazole  40 mg Oral QAM AC    piperacillin-tazobactam  2.25 g Intravenous Q6H    sevelamer carbonate  800 mg Oral 4x Daily    sodium chloride (PF)  3 mL Intracatheter Q8H    tacrolimus  1 mg Oral Q12H      No Known Allergies         Physical Exam:   Vitals were reviewed  BP (!) 88/55   Pulse 101   Temp 97.9  F (36.6  C) (Oral)   Resp 11   Ht 1.829 m (6')   Wt 92.6 kg (204 lb 2.3 oz)   SpO2 94%   BMI 27.69 kg/m      Wt Readings from Last  3 Encounters:   10/08/23 92.6 kg (204 lb 2.3 oz)   10/03/23 86.2 kg (190 lb)   10/01/23 87.9 kg (193 lb 11.2 oz)       Intake/Output Summary (Last 24 hours) at 10/8/2023 0856  Last data filed at 10/8/2023 0800  Gross per 24 hour   Intake 2076.33 ml   Output --   Net 2076.33 ml       GENERAL APPEARANCE: pleasant, looks ill  HEENT:  eyes/ears/nose/neck grossly nl  RESP: CTA B c good efforts anteriorly  CV: RRR, nl S1/S2   ABDOMEN: soft, distended  EXTREMITIES/SKIN: no peripheral edema. Left arm with area of hematoma around distal part of upper arm fistula  ACCESS: LAF c good thrill/bruit, RIJ TDC site ok           Data:     BMP  Recent Labs   Lab 10/08/23  0608 10/07/23  2230 10/07/23  0743 10/07/23  0431 10/06/23  0905 10/06/23  0533 10/05/23  1611 10/05/23  0251 10/05/23  0245   *  --   --  138  --  134*  --  135 136   POTASSIUM 5.2  --   --  4.2  --  4.6  --  3.6 3.8   CHLORIDE 96*  --   --  97*  --  94*  --   --  90*   KELLY 8.8  --   --  8.6  --  8.9  --   --  9.4   CO2 24  --   --  25  --  23  --   --  24   BUN 54.8*  --   --  40.9*  --  60.5*  --   --  44.5*   CR 6.91*  --   --  5.45*  --  7.03*  --   --  5.77*   GLC 90  94 145* 109* 136*   < > 152*   < >  --  127*    < > = values in this interval not displayed.     CBC  Recent Labs   Lab 10/08/23  0608 10/06/23  0533 10/05/23  0251 10/05/23  0245 10/03/23  1638   WBC 6.0 6.6  --  9.9 4.8   HGB 9.0* 9.5* 12.6* 11.4* 9.8*   HCT 28.2* 29.2* 37* 35.2* 30.7*   MCV 98 94  --  96 97   * 117*  --  146* 93*     Lab Results   Component Value Date    AST 11 10/08/2023    ALT 7 10/08/2023    ALKPHOS 136 (H) 10/08/2023    BILITOTAL 0.6 10/08/2023    CHANDLER 14 (L) 10/05/2023     Lab Results   Component Value Date    INR 1.55 (H) 10/05/2023       Attestation:  I have reviewed today's vital signs, notes, medications, labs and imaging.    Torsten Montaño DO  [facility] - Nephrology  Office: [phone]  Cell: [phone]

## 2023-10-09 ENCOUNTER — APPOINTMENT (OUTPATIENT)
Dept: CARDIOLOGY | Facility: CLINIC | Age: 58
DRG: 871 | End: 2023-10-09
Attending: INTERNAL MEDICINE
Payer: COMMERCIAL

## 2023-10-09 LAB
ALBUMIN SERPL BCG-MCNC: 3.5 G/DL (ref 3.5–5.2)
ALP SERPL-CCNC: 152 U/L (ref 40–129)
ALT SERPL W P-5'-P-CCNC: 7 U/L (ref 0–70)
ANION GAP SERPL CALCULATED.3IONS-SCNC: 16 MMOL/L (ref 7–15)
APTT PPP: 64 SECONDS (ref 22–38)
AST SERPL W P-5'-P-CCNC: 13 U/L (ref 0–45)
ATRIAL RATE - MUSE: NORMAL BPM
BILIRUB SERPL-MCNC: 0.7 MG/DL
BUN SERPL-MCNC: 36.1 MG/DL (ref 8–23)
CALCIUM SERPL-MCNC: 8.7 MG/DL (ref 8.6–10)
CHLORIDE SERPL-SCNC: 95 MMOL/L (ref 98–107)
CREAT SERPL-MCNC: 4.8 MG/DL (ref 0.67–1.17)
DEPRECATED HCO3 PLAS-SCNC: 23 MMOL/L (ref 22–29)
DIASTOLIC BLOOD PRESSURE - MUSE: NORMAL MMHG
EGFRCR SERPLBLD CKD-EPI 2021: 13 ML/MIN/1.73M2
ERYTHROCYTE [DISTWIDTH] IN BLOOD BY AUTOMATED COUNT: 14.9 % (ref 10–15)
GLUCOSE SERPL-MCNC: 124 MG/DL (ref 70–99)
HCT VFR BLD AUTO: 26.4 % (ref 40–53)
HGB BLD-MCNC: 8.4 G/DL (ref 13.3–17.7)
INTERPRETATION ECG - MUSE: NORMAL
MCH RBC QN AUTO: 31.3 PG (ref 26.5–33)
MCHC RBC AUTO-ENTMCNC: 31.8 G/DL (ref 31.5–36.5)
MCV RBC AUTO: 99 FL (ref 78–100)
P AXIS - MUSE: NORMAL DEGREES
PLATELET # BLD AUTO: 152 10E3/UL (ref 150–450)
POTASSIUM SERPL-SCNC: 3.9 MMOL/L (ref 3.4–5.3)
PR INTERVAL - MUSE: NORMAL MS
PROT SERPL-MCNC: 7 G/DL (ref 6.4–8.3)
QRS DURATION - MUSE: 74 MS
QT - MUSE: 370 MS
QTC - MUSE: 472 MS
R AXIS - MUSE: 46 DEGREES
RBC # BLD AUTO: 2.68 10E6/UL (ref 4.4–5.9)
SODIUM SERPL-SCNC: 134 MMOL/L (ref 135–145)
SYSTOLIC BLOOD PRESSURE - MUSE: NORMAL MMHG
T AXIS - MUSE: 40 DEGREES
VENTRICULAR RATE- MUSE: 98 BPM
WBC # BLD AUTO: 7.9 10E3/UL (ref 4–11)

## 2023-10-09 PROCEDURE — 250N000011 HC RX IP 250 OP 636: Mod: JZ | Performed by: HOSPITALIST

## 2023-10-09 PROCEDURE — 99418 PROLNG IP/OBS E/M EA 15 MIN: CPT | Performed by: HOSPITALIST

## 2023-10-09 PROCEDURE — 250N000013 HC RX MED GY IP 250 OP 250 PS 637: Performed by: HOSPITALIST

## 2023-10-09 PROCEDURE — 85027 COMPLETE CBC AUTOMATED: CPT | Performed by: HOSPITALIST

## 2023-10-09 PROCEDURE — 93308 TTE F-UP OR LMTD: CPT

## 2023-10-09 PROCEDURE — 99233 SBSQ HOSP IP/OBS HIGH 50: CPT | Performed by: HOSPITALIST

## 2023-10-09 PROCEDURE — 94660 CPAP INITIATION&MGMT: CPT

## 2023-10-09 PROCEDURE — 250N000013 HC RX MED GY IP 250 OP 250 PS 637: Performed by: INTERNAL MEDICINE

## 2023-10-09 PROCEDURE — 90935 HEMODIALYSIS ONE EVALUATION: CPT | Performed by: INTERNAL MEDICINE

## 2023-10-09 PROCEDURE — 999N000215 HC STATISTIC HFNC ADULT NON-CPAP

## 2023-10-09 PROCEDURE — 258N000003 HC RX IP 258 OP 636: Performed by: INTERNAL MEDICINE

## 2023-10-09 PROCEDURE — 999N000157 HC STATISTIC RCP TIME EA 10 MIN

## 2023-10-09 PROCEDURE — 210N000002 HC R&B HEART CARE

## 2023-10-09 PROCEDURE — 93325 DOPPLER ECHO COLOR FLOW MAPG: CPT | Mod: 26 | Performed by: INTERNAL MEDICINE

## 2023-10-09 PROCEDURE — 250N000012 HC RX MED GY IP 250 OP 636 PS 637: Performed by: INTERNAL MEDICINE

## 2023-10-09 PROCEDURE — 272N000064 HC CIRCUIT HUMIDITY W/CPAP BIPAP

## 2023-10-09 PROCEDURE — 93321 DOPPLER ECHO F-UP/LMTD STD: CPT | Mod: 26 | Performed by: INTERNAL MEDICINE

## 2023-10-09 PROCEDURE — 90937 HEMODIALYSIS REPEATED EVAL: CPT

## 2023-10-09 PROCEDURE — 250N000012 HC RX MED GY IP 250 OP 636 PS 637: Performed by: HOSPITALIST

## 2023-10-09 PROCEDURE — 85730 THROMBOPLASTIN TIME PARTIAL: CPT | Performed by: INTERNAL MEDICINE

## 2023-10-09 PROCEDURE — 80053 COMPREHEN METABOLIC PANEL: CPT | Performed by: INTERNAL MEDICINE

## 2023-10-09 PROCEDURE — 250N000011 HC RX IP 250 OP 636: Mod: JZ | Performed by: INTERNAL MEDICINE

## 2023-10-09 PROCEDURE — 93005 ELECTROCARDIOGRAM TRACING: CPT

## 2023-10-09 PROCEDURE — 93308 TTE F-UP OR LMTD: CPT | Mod: 26 | Performed by: INTERNAL MEDICINE

## 2023-10-09 PROCEDURE — 93325 DOPPLER ECHO COLOR FLOW MAPG: CPT

## 2023-10-09 PROCEDURE — 99233 SBSQ HOSP IP/OBS HIGH 50: CPT | Mod: FS | Performed by: NURSE PRACTITIONER

## 2023-10-09 PROCEDURE — 93010 ELECTROCARDIOGRAM REPORT: CPT | Performed by: INTERNAL MEDICINE

## 2023-10-09 PROCEDURE — 99207 PR APP CREDIT; MD BILLING SHARED VISIT: CPT | Performed by: PHYSICIAN ASSISTANT

## 2023-10-09 RX ORDER — MIDODRINE HYDROCHLORIDE 5 MG/1
10 TABLET ORAL
Status: DISCONTINUED | OUTPATIENT
Start: 2023-10-09 | End: 2023-10-12

## 2023-10-09 RX ADMIN — LACOSAMIDE 100 MG: 50 TABLET, FILM COATED ORAL at 21:41

## 2023-10-09 RX ADMIN — TACROLIMUS 1 MG: 1 CAPSULE ORAL at 21:41

## 2023-10-09 RX ADMIN — GUAIFENESIN 600 MG: 600 TABLET, EXTENDED RELEASE ORAL at 21:45

## 2023-10-09 RX ADMIN — SODIUM CHLORIDE 300 ML: 9 INJECTION, SOLUTION INTRAVENOUS at 11:39

## 2023-10-09 RX ADMIN — AMIODARONE HYDROCHLORIDE 200 MG: 200 TABLET ORAL at 15:32

## 2023-10-09 RX ADMIN — SEVELAMER CARBONATE 800 MG: 800 TABLET, FILM COATED ORAL at 06:40

## 2023-10-09 RX ADMIN — MIDODRINE HYDROCHLORIDE 10 MG: 5 TABLET ORAL at 10:57

## 2023-10-09 RX ADMIN — ACETAMINOPHEN 650 MG: 325 TABLET, FILM COATED ORAL at 06:45

## 2023-10-09 RX ADMIN — MIDODRINE HYDROCHLORIDE 15 MG: 5 TABLET ORAL at 19:16

## 2023-10-09 RX ADMIN — PIPERACILLIN AND TAZOBACTAM 2.25 G: 2; .25 INJECTION, POWDER, FOR SOLUTION INTRAVENOUS at 14:46

## 2023-10-09 RX ADMIN — ACETAMINOPHEN 650 MG: 325 TABLET, FILM COATED ORAL at 19:20

## 2023-10-09 RX ADMIN — PANTOPRAZOLE SODIUM 40 MG: 40 TABLET, DELAYED RELEASE ORAL at 06:40

## 2023-10-09 RX ADMIN — SODIUM CHLORIDE 250 ML: 9 INJECTION, SOLUTION INTRAVENOUS at 11:39

## 2023-10-09 RX ADMIN — PIPERACILLIN AND TAZOBACTAM 2.25 G: 2; .25 INJECTION, POWDER, FOR SOLUTION INTRAVENOUS at 21:35

## 2023-10-09 RX ADMIN — MELATONIN TAB 3 MG 3 MG: 3 TAB at 22:29

## 2023-10-09 RX ADMIN — AMIODARONE HYDROCHLORIDE 200 MG: 200 TABLET ORAL at 21:44

## 2023-10-09 RX ADMIN — TACROLIMUS 1 MG: 1 CAPSULE ORAL at 15:32

## 2023-10-09 RX ADMIN — HYDROMORPHONE HYDROCHLORIDE 0.2 MG: 0.2 INJECTION, SOLUTION INTRAMUSCULAR; INTRAVENOUS; SUBCUTANEOUS at 21:23

## 2023-10-09 RX ADMIN — Medication: at 11:40

## 2023-10-09 RX ADMIN — MIDODRINE HYDROCHLORIDE 15 MG: 5 TABLET ORAL at 08:58

## 2023-10-09 RX ADMIN — OXYCODONE HYDROCHLORIDE AND ACETAMINOPHEN 1 TABLET: 7.5; 325 TABLET ORAL at 22:29

## 2023-10-09 RX ADMIN — HYDROMORPHONE HYDROCHLORIDE 0.2 MG: 0.2 INJECTION, SOLUTION INTRAMUSCULAR; INTRAVENOUS; SUBCUTANEOUS at 17:07

## 2023-10-09 RX ADMIN — GABAPENTIN 300 MG: 300 CAPSULE ORAL at 21:41

## 2023-10-09 RX ADMIN — PIPERACILLIN AND TAZOBACTAM 2.25 G: 2; .25 INJECTION, POWDER, FOR SOLUTION INTRAVENOUS at 02:01

## 2023-10-09 RX ADMIN — HEPARIN SODIUM 1200 UNITS/HR: 10000 INJECTION, SOLUTION INTRAVENOUS at 00:52

## 2023-10-09 RX ADMIN — SEVELAMER CARBONATE 800 MG: 800 TABLET, FILM COATED ORAL at 14:47

## 2023-10-09 RX ADMIN — MIDODRINE HYDROCHLORIDE 15 MG: 5 TABLET ORAL at 14:47

## 2023-10-09 RX ADMIN — SEVELAMER CARBONATE 800 MG: 800 TABLET, FILM COATED ORAL at 16:47

## 2023-10-09 RX ADMIN — OXYCODONE HYDROCHLORIDE AND ACETAMINOPHEN 1 TABLET: 7.5; 325 TABLET ORAL at 04:28

## 2023-10-09 RX ADMIN — HEPARIN SODIUM 1200 UNITS/HR: 10000 INJECTION, SOLUTION INTRAVENOUS at 22:31

## 2023-10-09 RX ADMIN — Medication 1 CAPSULE: at 14:48

## 2023-10-09 RX ADMIN — GUAIFENESIN 600 MG: 600 TABLET, EXTENDED RELEASE ORAL at 04:28

## 2023-10-09 RX ADMIN — LACOSAMIDE 50 MG: 50 TABLET, FILM COATED ORAL at 14:47

## 2023-10-09 RX ADMIN — OXYCODONE HYDROCHLORIDE AND ACETAMINOPHEN 1 TABLET: 7.5; 325 TABLET ORAL at 14:48

## 2023-10-09 ASSESSMENT — ACTIVITIES OF DAILY LIVING (ADL)
ADLS_ACUITY_SCORE: 35
DEPENDENT_IADLS:: CLEANING;COOKING;LAUNDRY
ADLS_ACUITY_SCORE: 35

## 2023-10-09 NOTE — CODE/RAPID RESPONSE
[facility]   RRT/House Officer HARRY Progress Note  Date of Service: 10/9/2023   Time Called: 1654  RRT called for (per RN): chest pain    IMPRESSION & PLAN:  Assessment & Plan     Blanco Osborne is a 58 year old male w/ PMH of ESRD on hemodialysis, liver transplant in 2016 due to alcoholic liver disease, now with progressive cirrhosis and ascites, paroxysmal atrial fibrillation on Eliquis, chronic pericardial effusion,diabetes mellitus type 2, previous CVA, hypertension, CHRISTIAN on BiPAP,  who presented on 10/5/2023 with palpitations and chest discomfort.       RRT called by the nurse on the floor as the patient mentioned to have chest discomfort.  But in further discussions he actually had chest pain started approximately 4 and half hours ago while he was in hemodialysis this morning.  Reports is underneath his left chest.  It slowly increased over the course of the day, it was up to a max of 7 out of 10, presents down to 6 out of 10.  He denies it is GERD.  Does not report any shortness of breath although with initiation of oxygen chest pain is improved.  Patient denies any dizziness, or any other chest symptoms.  No abdominal symptoms.  He reports history of GERD but this does not feel anything like that.  Denies any other new concerns.  It is also of note that the patient has a history of currently diagnosed PE and is on heparin at this time.     -Shortly after the RRT was called I was advised that an echocardiogram done today was abnormal.  Cardiologist did respond to the room and I spoke with him at 1715, they report his pericardial effusion previously known, has increased from moderate to large. Desptie drain 9/29.  Thus they agree to transfer him to [facility], with a future recommendation for a pericardial window for drainage.  He has had a past drainage.  It is ultimately thought that his chest pain is most likely related to this Updated EKG did not show any acute ischemic signs.  Patient was given  Dilaudid for his pain.    Working diagnosis/Impression:  -Atypical chest pain thought secondary to pericardial effusion versus PE  -Chronic pericardial effusion, increasing moderate--> large .  S/p 9/29 drain Sequela of end-stage liver disease with ascites, ESRD on HD, (on anticoagulation also)  -Acute PE, PTA Eliquis, currently on heparin  -ESRD on HD  -End-stage liver disease, s/p liver transplant 2016. Hx Alc cirrhosis  -Septic shock, since controlled/resolved.  -SBP suspected, status post paracentesis  -CAP suspected    Differential diagnosis considered following (not exclusive):  -PE known but is PTA Eliquis, since switched to heparin, no strain signs  -Atypical chest wall possible  -Pain related to pneumonia is possible   ACS-thought unlikely, noting nonischemic EKG without acute change.  -GERD-seems unlikely  -Hemodialysis related chest pain seems unlikely never had before  -A-fib and wide-complex tachycardia w failed cardioversion.  Currently NSR though.    INTERVENTIONS:  -Updated EKG, nonischemic, normal sinus, no A-fib.  -Dilaudid 0.2 given (as needed)  -PO oxycodone available  -Transferring to coronary care unit shortly. Transfer orders done beforehand  -N.p.o. at midnight- future pericardial window to be arranged by hospitalist/cardiology  -Additional treatments as per hospitalist note from 1003a.  -Coordinated in detail with cardiologist in the room, appreciate help.    At the end of the RRT, pt appears to be stable reporting his chest discomfort is sl better.  Just did receive the Dilaudid.  Had no other new symptoms.  On oxygen.,  With stable BP systolic 110/70s.  Pain reportedly slightly better    Disposition-pending transfer to cardiac unit.  Stable at this time.    Discussed with cardiologist MD and PA in detail in the room.  Defer future cares to rounder/hospitalist attending provider     Code Status: Full Code    Allergies   No Known Allergies    Last 24H PRN:     acetaminophen (TYLENOL) tablet  650 mg, 650 mg at 10/09/23 0645 **OR** acetaminophen (TYLENOL) Suppository 650 mg    HYDROmorphone (DILAUDID) injection 0.2 mg, 0.2 mg at 10/09/23 1707    midodrine (PROAMATINE) tablet 10 mg, 10 mg at 10/09/23 1057    oxyCODONE-acetaminophen (PERCOCET) 7.5-325 MG per tablet 1 tablet, 1 tablet at 10/09/23 1448    Subjective:  Interval History     As noted above patient reports he has had actually chest discomfort for at least 4 to 5 hours. L lower chest, rates 6.10 now, ? Heaviness.  It is unclear why he did not tell anyone until he came back to the room as it started during hemodialysis but it might have increased slightly recently.  Reports he get a little bit better despite not really having much meds.  He reports he is taking oxycodone only here in the hospital due to new pain in his left upper extremity fistula area which has a leak.  That however is improved.  Denies he has any shortness of breath but the application of oxygen has been helpful.  He has no current abdominal pain no current fevers or chills.  Does not endorse any other new concerns.  -As noted above [facility] MD and PA both responded to the room and updated the patient and myself in person regarding the echocardiogram results showing that his pericardial effusion has increased and will need future drainage versus favoring a pericardial window.    -Patient stable at time of end of call.      Exam::  Physical Exam   Vital Signs with Ranges:  Vitals:    10/09/23 1350 10/09/23 1355 10/09/23 1400 10/09/23 1633   BP: 115/71 111/74 115/74 103/62   BP Location:  Right arm  Right arm   Patient Position:       Cuff Size:  Adult Regular     Pulse: 82 81 80 78   Resp: 26 20 25 20   Temp:  98  F (36.7  C)  98.1  F (36.7  C)   TempSrc:  Oral  Oral   SpO2: 100% 99%  91%   Weight:       Height:         Temp:  [97.4  F (36.3  C)-98.2  F (36.8  C)] 98.1  F (36.7  C)  Pulse:  [73-89] 78  Resp:  [12-26] 20  BP: ()/(61-75)  103/62  SpO2:  [91 %-100 %] 91 %  I/O last 3 completed shifts:  In: 438 [I.V.:38; Other:400]  Out: 2000 [Other:2000]    Lines, PIV ,  Constitutional: vs as above and/or per EMR  General:  adult pt lying in bed without acute distress  HEENT: NC/AT,      Neck: w/o JVD, supple  CV: S1S2, w/o obvious murmur  Resp: on O2 at 2l, Spo2 99, chest rise unlabored, lungs clr, w/o rhonchi, rales, whz upper and lower lobes  GI: protuberant, soft, nontender, nondistended  Musculoskeletal: MAEW, no lower edema or mottling  Skin: no obvious rashes on exposed skin  Lymph: defer  Neuro: non focal, faces symmetric, tongue midline, speech fluent  Psych: calm    Labs/Imaging  Data     CBC with Diff:  Recent Labs   Lab Test 10/09/23  1109 10/08/23  0608 10/06/23  0533 10/05/23  0251 10/05/23  0245 09/27/23  1750 08/16/23  0734 12/22/22  1108 12/21/22  1315   WBC 7.9 6.0 6.6  --  9.9   < > 3.5*   < > 3.4*   HGB 8.4* 9.0* 9.5*   < > 11.4*   < > 8.5*   < > 8.0*   MCV 99 98 94  --  96   < > 100   < > 102*    106* 117*  --  146*   < > 73*   < > 75*   INR  --   --   --   --  1.55*  --  1.29*  --  1.36*    < > = values in this interval not displayed.      Comprehensive Metabolic Panel:  Recent Labs   Lab 10/09/23  1109 10/08/23  0608 10/07/23  0743 10/07/23  0431 10/06/23  1530 10/06/23  1040 10/06/23  0905 10/06/23  0533 10/05/23  0251 10/05/23  0245   * 134*  --  138  --   --   --  134*   < > 136   POTASSIUM 3.9 5.2  --  4.2  --   --   --  4.6   < > 3.8   CHLORIDE 95* 96*  --  97*  --   --   --  94*  --  90*   CO2 23 24  --  25  --   --   --  23  --  24   ANIONGAP 16* 14  --  16*  --   --   --  17*  --  22*   * 90  94   < > 136*   < >  --    < > 152*   < > 127*   BUN 36.1* 54.8*  --  40.9*  --   --   --  60.5*  --  44.5*   CR 4.80* 6.91*  --  5.45*  --   --   --  7.03*  --  5.77*   GFRESTIMATED 13* 9*  --  11*  --   --   --  8*  --  11*   KELLY 8.7 8.8  --  8.6  --   --   --  8.9  --  9.4   MAG  --   --   --   --   --   --    --   --   --  2.1   PHOS  --   --   --   --   --  6.6*  --  6.7*  --  5.2*   PROTTOTAL 7.0 6.1*  --  6.1*  --   --   --  6.6  --  7.6   ALBUMIN 3.5 3.0*  --  3.4*  --   --   --  3.3*  --  3.9   BILITOTAL 0.7 0.6  --  0.6  --   --   --  0.6  --  1.3*   ALKPHOS 152* 136*  --  166*  --   --   --  213*  --  252*   AST 13 11  --  9  --   --   --  12  --  17   ALT 7 7  --  8  --   --   --  10  --  13    < > = values in this interval not displayed.       VBG/ABG:    Recent Labs   Lab 10/05/23  0600 10/05/23  0251   PHV 7.37 7.42   PCO2V 49 45   PO2V 29 16*   HCO3V 28 29*       INR:    Recent Labs   Lab Test 10/05/23  0245   INR 1.55*       IMAGING recent:   Echocardiogram Limited:  10/09/2023 03:36 PM   Interpretation Summary  Moderate to large pericardial effusion, slightly larger compared to the study from 2 days ago. Measures 2.5 cm posteriorly. IVC is mildly dilated. Mild RV diastolic collapse and has inflow velocities variation consistent with early hemodynamic compromise. Correlate clinically. Reported findings to [facility].   Left Ventricle Left ventricular systolic function is normal.  Right Ventricle The right ventricle is normal in size and function.   Report approved by: Marily Ramirez MD on 10/09/2023 04:14 PM       Echocardiogram Limited 10/7/2023  Interpretation Summary  The visual ejection fraction is 60-65%. No regional wall motion abnormalities noted. Moderate pericardial effusion There are no echocardiographic indications of cardiac tamponade. Sinus rhythm was noted. Left Ventricle The visual ejection fraction is 60-65%. No regional wall motion abnormalities noted.  Mitral Valve There is trace mitral regurgitation.  Tricuspid Valve There is trace tricuspid regurgitation. IVC diameter <2.1 cm collapsing >50% with sniff suggests a normal RA pressure of 3 mmHg.  Aortic Valve There is trace aortic regurgitation.  Pericardium Moderate pericardial effusion. There are no echocardiographic  indications of cardiac tamponade.  Rhythm Sinus rhythm was noted.       Time Spent on this Encounter   I spent 30 additional minutes on the unit/floor managing the care of this patient. Over 50% of my time was spent counseling the patient w coordinating care regarding services listed in this note.    JEANCARLOS Ramachandran  [facility] House Officer/HARRY    Jose Manuel Castillo PA-C  [facility]  Securely message with the [employer] (learn more here)  Text page via [employer] Paging/Directory

## 2023-10-09 NOTE — PROGRESS NOTES
[facility]    Medicine Progress Note - Hospitalist Service    Date of Admission:  10/5/2023    Assessment & Plan     Blanco Osborne is a 58 year old male with extensive medical history that includes liver transplant in 2016 due to alcoholic liver disease, now with progressive cirrhosis and ascites, paroxysmal atrial fibrillation, on anticoagulation with Eliquis, diabetes mellitus type 2, previous CVA, hypertension, CHRISTIAN on BiPAP, chronic pericardial effusion, ESRD on hemodialysis who presented on 10/5/2023 with palpitations and chest discomfort.       He was hypotensive and had wide-complex tachycardia felt to be atrial fibrillation with aberrancy.  He failed 2 attempts of emergent cardioversion.  He was started on amiodarone and subsequently converted to sinus rhythm. He is now on po amiodarone     He received IV fluids but remained hypotensive and subsequently received pressors that were discontinued on 10/7. He had severe sepsis likely due to SBP and pneumonia. Blood and ascitic fluid cultures were negative. He was intially on vancomycin and zosyn. Vancomycin was discontinued on 10/8.     Troponins were elevated, felt to be demand ischemia.  Echo showed pericardial effusion without tamponade.  CT C/A/P showed PE and pulmonary infiltrates.  He was started on IV heparin.      Acute hypoxic respiratory failure  - multifactorial, Requiring supplemental oxygen, currently 2 L/min  - continue supplemental oxygen, wean as able     Severe sepsis with septic shock - due to SBP and multifactorial pneumonia -resolved  Chronic hypotension, on midodrine  -Is normally on midodrine for chronic hypotension.    -Initially presented with septic shock which has now resolved.  Off pressors since 10/7/2023.  Blood pressure currently in normal range  -Continue midodrine for chronic hypotension     End-stage liver disease, s/p liver transplant in 2016, now with cirrhosis of transplanted liver with recurrent  ascites and Portal hypertension  Probable SBP, s/p paracentesis  - peritoneal fluid was turbid with 1119 nucleated cells, no differential available  - culture negative for now including aerobic culture, anaerobic culture and fungal  - on IV zosyn, continue   - On immunosuppression with tacrolimus and given that patient is currently on amiodarone we will check for tacrolimus levels in the morning after discussion with pharmacy today and repeat EKG  - will plan for paracentesis in am based on plans from cardiology     Multifocal pneumonia, organism unspecified  - CT scan showed pulmonary infiltrates   - COVID 19/influenza/RSV negative  - blood cultures negative till date from 10/5  - unable to provide sputum specimen  -vancomycin was discontinued on 10/8  - continue IV zosyn for now     Pulmonary embolism in segmental and subsegmental pulmonary arteries of left upper lobe, without cor pulmonale -  Short segment  thrombophlebitis in right cephalic vein at antecubital fossa, likely related to prior intravenous access  -Eliquis held on admission and was started on IV heparin  - Anticoagulation with apixaban was interrupted for at least 2 days during previous hospitalization for pericardiocentesis.  Will transition back to apixaban when no more procedures required   - No DVT in extremities     Chronic pericardial effusion, s/p pericardiocentesis on 9/29.  Drain was removed on 9/30.   - Felt to be due to volume overload due to renal failure and cirrhosis  - Repeat TTE 10/7 showed moderate pericardial effusion, slightly larger than seen on 10/5, with no evidence of tamponade physiology  - Cardiology following and repeat echo is to be done today  -And if his pericardial effusion continues to enlarge then he may need pericardial drain placement again  - repeat echo concerning for increased fluid and d/w Dr vu and they will see the patient today and will move him to cards floor and discussed with pt, wife and RN        Chronic paroxysmal atrial fibrillation, on anticoagulation with Eliquis  Wide-complex tachycardia on admission felt to be atrial fibrillation with aberrant conduction, failed cardioversion x 2  -Patient was started on amiodarone and converted to normal sinus rhythm and his Eliquis was held and he was started on heparin drip  -Cardiology has been following the patient and we will continue with the heparin drip today and plan is to see the repeat echo and if no procedures are required in the near future then he can be transitioned to oral Eliquis       ESRD, on hemodialysis q. Monday, Wednesday, Friday  S/p left AV fistula pseudoaneurysm repair, 8/2023  His AV fistula on the left upper extremity infiltrated on 10/6-  -Nephrology is on board and patient will undergo dialysis today with plan to remove 2 kg UF goal  - continue dialysis per nephrology and continue with renal multivitamin and sevelamer       Small bilateral pleural effusions  Chronically loculated right pleural effusion  - stable         Generalized anxiety disorder  Restless leg syndrome  -On hydroxyzine and ropinirole        Chronic anemia-due to anemia of chronic disease  -Hemoglobin is baseline between 8-9  -Hemoglobin is stable today at 8.4     chronic thrombocytopenia-Resolved  -Secondary to cirrhosis  -Platelet count this morning is stable at 152 and will continue to monitor     Seizure disorder  -On, continue Vimpat     GERD  -Continue PPI     Probable CHRISTIAN  --Per patient's wife, he had a sleep study about 10 years ago but does not have any CPAP or BiPAP at home  -Did use BiPAP last night  -Need to  undergo sleep study as outpatient after discharge     Previous embolic strokes     Generalized weakness  -Continue with physical therapy and Occupational Therapy.     Pain at multiple locations  -Has pain in left upper extremity due to infiltration of AV fistula, pain in right upper extremity due to thrombophlebitis and frequent lab draws.  Also  complains of diffuse pain in shoulders and upper back  -continue Tylenol as needed  -IV Dilaudid 0.2 mg every 6 hours as needed and Percocet every 8 hours as needed                    Diet: Advance Diet as Tolerated: Regular Diet Adult  Snacks/Supplements Adult: Gelatein Plus; Between Meals    DVT Prophylaxis: Heparin drip  Nixon Catheter: Not present  Lines: PRESENT      Hemodialysis Vascular Access Right Subclavian-Site Assessment: WDL  Hemodialysis Vascular Access Arteriovenous fistula Left Forearm-Site Assessment: Bruit present;Thrill present      Cardiac Monitoring: ACTIVE order. Indication: Tachyarrhythmias, acute (48 hours)  Code Status: Full Code      Clinically Significant Risk Factors              # Hypoalbuminemia: Lowest albumin = 3 g/dL at 10/8/2023  6:08 AM, will monitor as appropriate   # Thrombocytopenia: Lowest platelets = 106 in last 2 days, will monitor for bleeding   # Hypertension: Noted on problem list        # Overweight: Estimated body mass index is 28.17 kg/m  as calculated from the following:    Height as of this encounter: 1.829 m (6').    Weight as of this encounter: 94.2 kg (207 lb 11.2 oz).             Disposition Plan      Expected Discharge Date: 10/12/2023                  Deloris Hamilton MD  Hospitalist Service  [facility]  Securely message with [employer] (more info)  Text page via [employer] Paging/Directory   ______________________________________________________________________    Interval History     I saw the patient in hemodialysis and his wife was present.  I did discuss plan of care with the patient and he was having some pain at the site of the fistula.  He did tell me that his permacath has been there for a long.  Of time.  He was tolerating hemodialysis well.  Patient did receive a higher dose of midodrine given that his blood pressure was on the lower side.    Of note wife did ask me about removing more fluid from the belly but given that he is having  ultrafiltration and was not having significant discomfort we will hold on for now        Physical Exam   Vital Signs: Temp: 98.2  F (36.8  C) Temp src: Oral BP: 111/65 Pulse: 84   Resp: 16 SpO2: 96 % O2 Device: Nasal cannula Oxygen Delivery: 2 LPM  Weight: 207 lbs 11.2 oz        General: Patient appears comfortable and in no acute distress.  HEENT: Head is atraumatic, normocephalic.  Pupils are equal, round and reactive to light.  No scleral icterus. Oral mucosa is moist   Neck: Neck is supple and No Lymphadenopathy   Respiratory: Lungs are clear to auscultation bilaterally with no wheeze or crackles and has right-sided permacath  Cardiovascular: Regular rate , S1 and S2 normal with no murmer or rubs or gallops  Abdomen:   soft , non tender , non distended and bowel sound present   Skin: No skin rashes or lesions to inspection or palpation.  Neurologic:  No facial droop  Musculoskeletal: Normal Range of motion over upper and lower extremities bilaterally   Psychiatric: cooperative          Medical Decision Making       Time spent in care of patient is 45 minutes and I discussed plan of care with the patient, nurse and also discussed with his wife and discussed with nephrology team and appreciate input     Data     I have personally reviewed the following data over the past 24 hrs:    7.9  \   8.4 (L)   / 152     134 (L) 95 (L) 36.1 (H) /  124 (H)   3.9 23 4.80 (H) \     ALT: 7 AST: 13 AP: 152 (H) TBILI: 0.7   ALB: 3.5 TOT PROTEIN: 7.0 LIPASE: N/A     INR:  N/A PTT:  64 (H)   D-dimer:  N/A Fibrinogen:  N/A       Imaging results reviewed over the past 24 hrs:   No results found for this or any previous visit (from the past 24 hour(s)).

## 2023-10-09 NOTE — PLAN OF CARE
Summary: Presented to the ER with palpations and chest discomfort. Found to have acute hypoxic respiratory failure and severe sepsis with septic shock- resolved. Transferred from ICU evening 10/8       DATE & TIME: 10/8/2023 Night           Cognitive Concerns/ Orientation: Alert/Oriented x 4; calm/cooperative; forgetful   BEHAVIOR & AGGRESSION TOOL COLOR: Green       ABNL VS/O2: VSS, 2 LPM oxygen via nasal cannula (declined BiPaP after 4 am) to keep O2 sats above 90%   MOBILITY: Assist-1 gait belt/walker; up to bathroom   PAIN MANAGMENT: PRN Percocet (3x/d) given once for headache/BUE pain  DIET: Reg  BOWEL/BLADDER: Continent; on hemodialysis with very little urine output; 2 BMs overnight-passing flatus  ABNL LAB/BG: Fx=799, urea=54.8, creatinine= 6.91, albumin= 3.0, pro=6.1, alk phos=136, hgb=9.0, hematocrit= 28.2; platelets= 106, PTT= 73 (WDL x 2, next check 10/9 am)   DRAIN/DEVICES: x 2 PIV, RU PIV infusing heparin at 1200 units/hr (12 mL/hr), RL. PIV/SL   TELEMETRY RHYTHM:NSR   SKIN: dusky, LUE swollen from previous infiltration- encouraged elevation, refused heat packs. L. Fistula - thrill/bruit (+)  TESTS/PROCEDURES: Plan for dialysis (M-W-F schedule), ECHO, and possible paracentesis 10/09   D/C DAY/GOALS/PLACE: pending clinical improvement   OTHER IMPORTANT INFO: Cardiology/nephrology consults; Ambulated around unit x1

## 2023-10-09 NOTE — PLAN OF CARE
Goal Outcome Evaluation:  DATE & TIME: 110/9/23 0700- 1530      Cognitive Concerns/ Orientation: Alert/Oriented x 4; calm/cooperative; forgetful   BEHAVIOR & AGGRESSION TOOL COLOR: Green       ABNL VS/O2: VSS, 2 LPM oxygen via nasal cannula, de sats to 88- 89% on RA  MOBILITY: Assist-1 gait belt/walker; up to bathroom   PAIN MANAGMENT: PRN Percocet  given once for headache/BUE pain  DIET: Reg  BOWEL/BLADDER: Continent; on hemodialysis with very little urine output; no Bm this shift  ABNL LAB/BG: Ks=074,creatinine= 4.0, Alk phos=152 hgb=9.0,  PTT 64( next check 10/10 am)   DRAIN/DEVICES: x 2 PIV, RU PIV infusing heparin at 1200 units/hr  RL. PIV/SL with intermittent abx   TELEMETRY RHYTHM:NSR   SKIN: dusky, LUE swollen from previous infiltration- encouraged elevation, refused heat packs. L. Fistula - thrill/bruit (+)  TESTS/PROCEDURES:Dialysis today, removed about 2.4L, ECHO pending.  D/C DAY/GOALS/PLACE: pending clinical improvement   OTHER IMPORTANT INFO: Cardiology/nephrology consults

## 2023-10-09 NOTE — PROGRESS NOTES
Pt was on BIPAP of 12/6 and 30% the whole night with no issue. The VS were stable throughout. We will continue to follow   Lisa Craig RT

## 2023-10-09 NOTE — PLAN OF CARE
Goal Outcome Evaluation:      Plan of Care Reviewed With: patient    Summary: Presented to the ER with palpations and chest discomfort. Found to have acute hypoxic respiratory failure and severe sepsis with septic shock- resolved. Transferred from ICU this evening 10/8     DATE & TIME: 10/8/2023, [id number]   Cognitive Concerns/ Orientation : A & O x 4. Calm and cooperative. Forgetful at times   BEHAVIOR & AGGRESSION TOOL COLOR: Green   CIWA SCORE: NA    ABNL VS/O2: VSS on RA, intermittent O2 2L when resting. BIPAP at night   MOBILITY: x 1 GB/W. Up to the bathroom   PAIN MANAGMENT: reported headache pain 7/10. Managed well with PRN tylenol, rest. Will request percocet at times.   DIET: regular, tolerating   BOWEL/BLADDER: BS active x 4. Continent   ABNL LAB/BG: Xb=465, urea=54.8, creatinine= 6.91, albumin= 3.0, alk phos=136, hgb=9.0, PTT= 73 (WDL x 2, next check 10/9 am)   DRAIN/DEVICES: x 2 PIV, RU PIV infusing heparin at 1200 units/hr (12 mL/hr), RL. PIV/SL   TELEMETRY RHYTHM:NSR   SKIN: dusky, LUE swollen from previous infiltration- encouraged elevation, refused heat packs. L. Fistula - thrill/bruit (+)  TESTS/PROCEDURES: Plan for dialysis, ECHO, and possible paracentesis 10/09   D/C DAY/GOALS/PLACE: pending clinical improvement   OTHER IMPORTANT INFO: hemodialysis: Mon., Wed., Fri., denies SOB/chest pain. Cards and nephrology consulted. Wife at bedside and updated on plan of care.

## 2023-10-09 NOTE — PROGRESS NOTES
Renal Medicine Progress Note            Assessment/Plan:     1.  ESKD. MWF HD. No UF on HD yesterday. Volume excess may be only in abd, pericardial spaces and not easily amenable to removal with HD.      Will plan to rest left AVF with infiltration 10//6 and continue CVC for now.     Anemia: 9.0 today.     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  Sepsis syndrome.  Now off pressors. ON zosyn for possible SBP.  Improved hemodynamics on 15mg mg TID midodrine. Giving addition dose 10mg mid run during HD today.      3.  PE.  Pt is on a heparin gtt.       4.  Anemia: 9.0 yesterday.           Plan/Recs:  1) Plan HD today via CVC, 2kg UF goal  2) if inadequate UF, may need additional paracentesis  3) Planned TTE today     Torsten Montaño DO  [facility]  Office: [phone]  Cell: [phone]        Interval History:      Pt seen at start of dialysis. BP's better with 15mg TID midodrine. Spouse at bedside. He denies any changes except abd feels more distended.            Medications and Allergies:      - MEDICATION INSTRUCTIONS for Dialysis Patients -   Does not apply See Admin Instructions    amiodarone  200 mg Oral BID    gabapentin  300 mg Oral At Bedtime    guaiFENesin  600 mg Oral BID    Lacosamide  100 mg Oral QPM    lacosamide  50 mg Oral QAM    melatonin  3 mg Oral At Bedtime    midodrine  15 mg Oral TID w/meals    multivitamin RENAL  1 capsule Oral Daily    - MEDICATION INSTRUCTIONS -   Does not apply Once    pantoprazole  40 mg Oral QAM AC    piperacillin-tazobactam  2.25 g Intravenous Q6H    sevelamer carbonate  800 mg Oral 4x Daily    sodium chloride 0.9%  250 mL Intravenous Once in dialysis/CRRT    sodium chloride 0.9%  300 mL Hemodialysis Machine Once    tacrolimus  1 mg Oral Q12H      No Known Allergies         Physical Exam:   Vitals were reviewed  /65 (BP Location: Right arm)   Pulse 84   Temp 98.2  F (36.8  C) (Oral)   Resp 16   Ht 1.829 m (6')   Wt  94.2 kg (207 lb 11.2 oz)   SpO2 96%   BMI 28.17 kg/m      Wt Readings from Last 3 Encounters:   10/09/23 94.2 kg (207 lb 11.2 oz)   10/03/23 86.2 kg (190 lb)   10/01/23 87.9 kg (193 lb 11.2 oz)       Intake/Output Summary (Last 24 hours) at 10/9/2023 1008  Last data filed at 10/8/2023 1710  Gross per 24 hour   Intake 690 ml   Output --   Net 690 ml       GENERAL APPEARANCE: alert, oriented  HEENT:  Eyes/ears/nose/neck grossly normal  RESP: lungs cta b c good efforts, no crackles, rhonchi or wheezes  CV: RRR, nl S1/S2, distant, no murmurs  ABDOMEN: distended  EXTREMITIES/SKIN: no c/c/rashes/lesions; 1+ b/l leg edema  NEURO:  non-focal           Data:     BMP  Recent Labs   Lab 10/08/23  0608 10/07/23  2230 10/07/23  0743 10/07/23  0431 10/06/23  0905 10/06/23  0533 10/05/23  1611 10/05/23  0251 10/05/23  0245   *  --   --  138  --  134*  --  135 136   POTASSIUM 5.2  --   --  4.2  --  4.6  --  3.6 3.8   CHLORIDE 96*  --   --  97*  --  94*  --   --  90*   KELLY 8.8  --   --  8.6  --  8.9  --   --  9.4   CO2 24  --   --  25  --  23  --   --  24   BUN 54.8*  --   --  40.9*  --  60.5*  --   --  44.5*   CR 6.91*  --   --  5.45*  --  7.03*  --   --  5.77*   GLC 90  94 145* 109* 136*   < > 152*   < >  --  127*    < > = values in this interval not displayed.     CBC  Recent Labs   Lab 10/08/23  0608 10/06/23  0533 10/05/23  0251 10/05/23  0245 10/03/23  1638   WBC 6.0 6.6  --  9.9 4.8   HGB 9.0* 9.5* 12.6* 11.4* 9.8*   HCT 28.2* 29.2* 37* 35.2* 30.7*   MCV 98 94  --  96 97   * 117*  --  146* 93*     Lab Results   Component Value Date    AST 11 10/08/2023    ALT 7 10/08/2023    ALKPHOS 136 (H) 10/08/2023    BILITOTAL 0.6 10/08/2023    CHANDLER 14 (L) 10/05/2023     Lab Results   Component Value Date    INR 1.55 (H) 10/05/2023       Attestation:  I have reviewed today's vital signs, notes, medications, labs and imaging.    DO [facility] - Nephrology  Office: [phone]  Cell:  [phone]

## 2023-10-09 NOTE — PROGRESS NOTES
[facility] Cardiology Progress Note  Date of Service: 10/09/2023  Staff Cardiologist: Dr. Alfaro     Assessment & Plan   Blanco Osborne is a 58 year old male with past medical history significant for liver transplant in 2016 due to alcoholic liver disease, now with progressive cirrhosis and ascites, paroxysmal atrial fibrillation on anticoagulation with Eliquis, diabetes mellitus type 2, previous CVA, hypertension, CHRISTIAN on BiPAP, chronic pericardial effusion, ESRD on hemodialysis who presented on 10/5/2023 with palpitations and chest discomfort, found to have PE and .     Interval History:   Patient is having ongoing chest discomfort, no clinical signs of tamponade. He is hemodynamically stable.     Assessment:   Recurrent pericardial effusion   - Patient underwent pericardiocentesis on 9/29, follow-up echo with trivial effusion   - Echo (10/8) revealed recurrent moderate pericardial effusion, repeat echo today with slightly larger effusion   - No clinical signs of tamponade, no RV collapse  - Recurrent effusions likely 2/2 ESRD and progressive liver disease     Paroxysmal atrial fibrillation   - Presented with wide-complex tachycardiac felt to be Afib s/p failed DCCV x 2 in ED  - Subsequently converted to sinus on amiodarone, now on oral amiodarone  - On heparin gtt [PTA apixaban 5 mg BID]    ESRD on HD  - Nephrology following, HD on MWF   - Awaiting renal transplant     Pulmonary embolism  - CT CAP showed PE, on heparin gtt     5. S/p liver transplant in 2016, now with cirrhosis  - On chronic immunosuppression therapy    Plan:  Plan for pericardial window vs pericardiocentesis tomorrow.   NPO at midnight.   Will continue to follow.     Darline Frazier, CNP  Pager:  [phone]  (7am - 5pm, M-F)      Physical Exam   Temp: 98.1  F (36.7  C) Temp src: Oral BP: 103/62 Pulse: 78   Resp: 20 SpO2: 91 % O2 Device: Nasal cannula Oxygen Delivery: 2 LPM  Vitals:    10/07/23 0645  10/08/23 0000 10/09/23 0620   Weight: 91.2 kg (201 lb 1 oz) 92.6 kg (204 lb 2.3 oz) 94.2 kg (207 lb 11.2 oz)       Constitutional:   NAD   Skin:   Warm and dry   Head:   Nontraumatic   Neck:   no JVD   Lungs:   normal   Cardiovascular:   regular rate and rhythm   Abdomen:   Benign   Extremities and Back:   No edema   Neurological:   Grossly nonfocal       Medications    heparin 1,200 Units/hr (10/09/23 0052)    - MEDICATION INSTRUCTIONS -        - MEDICATION INSTRUCTIONS for Dialysis Patients -   Does not apply See Admin Instructions    amiodarone  200 mg Oral BID    gabapentin  300 mg Oral At Bedtime    guaiFENesin  600 mg Oral BID    Lacosamide  100 mg Oral QPM    lacosamide  50 mg Oral QAM    melatonin  3 mg Oral At Bedtime    midodrine  10 mg Oral Once in dialysis/CRRT    midodrine  15 mg Oral TID w/meals    multivitamin RENAL  1 capsule Oral Daily    pantoprazole  40 mg Oral QAM AC    piperacillin-tazobactam  2.25 g Intravenous Q6H    sevelamer carbonate  800 mg Oral 4x Daily    tacrolimus  1 mg Oral Q12H       Data     Most Recent 3 CBC's:  Recent Labs   Lab Test 10/09/23  1109 10/08/23  0608 10/06/23  0533   WBC 7.9 6.0 6.6   HGB 8.4* 9.0* 9.5*   MCV 99 98 94    106* 117*     Most Recent 3 BMP's:  Recent Labs   Lab Test 10/09/23  1109 10/08/23  0608 10/07/23  0743 10/07/23  0431   * 134*  --  138   POTASSIUM 3.9 5.2  --  4.2   CHLORIDE 95* 96*  --  97*   CO2 23 24  --  25   BUN 36.1* 54.8*  --  40.9*   CR 4.80* 6.91*  --  5.45*   ANIONGAP 16* 14  --  16*   KELLY 8.7 8.8  --  8.6   * 90  94   < > 136*    < > = values in this interval not displayed.     Most Recent 3 Troponin's:No lab results found.      Medical Decision Making       40 MINUTES SPENT BY ME on the date of service doing chart review, history, exam, documentation & further activities per the note.        Clinically Significant Risk Factors              # Hypoalbuminemia: Lowest albumin = 3 g/dL at 10/8/2023  6:08 AM, will  monitor as appropriate   # Thrombocytopenia: Lowest platelets = 106 in last 2 days, will monitor for bleeding   # Hypertension: Noted on problem list        # Overweight: Estimated body mass index is 28.17 kg/m  as calculated from the following:    Height as of this encounter: 1.829 m (6').    Weight as of this encounter: 94.2 kg (207 lb 11.2 oz).

## 2023-10-09 NOTE — PROGRESS NOTES
Potassium   Date Value Ref Range Status   10/09/2023 3.9 3.4 - 5.3 mmol/L Final   12/29/2022 3.4 3.4 - 5.3 mmol/L Final   04/20/2020 3.9 3.4 - 5.3 mmol/L Final     Potassium POCT   Date Value Ref Range Status   10/05/2023 3.6 3.4 - 5.3 mmol/L Final     Hemoglobin   Date Value Ref Range Status   10/09/2023 8.4 (L) 13.3 - 17.7 g/dL Final   04/20/2020 14.3 13.3 - 17.7 g/dL Final     Creatinine   Date Value Ref Range Status   10/09/2023 4.80 (H) 0.67 - 1.17 mg/dL Final   04/20/2020 1.06 0.66 - 1.25 mg/dL Final     Urea Nitrogen   Date Value Ref Range Status   10/09/2023 36.1 (H) 8.0 - 23.0 mg/dL Final   12/29/2022 46 (H) 7 - 30 mg/dL Final   04/20/2020 23 7 - 30 mg/dL Final     Sodium   Date Value Ref Range Status   10/09/2023 134 (L) 135 - 145 mmol/L Final     Comment:     Reference intervals for this test were updated on 09/26/2023 to more accurately reflect our healthy population. There may be differences in the flagging of prior results with similar values performed with this method. Interpretation of those prior results can be made in the context of the updated reference intervals.    04/20/2020 139 133 - 144 mmol/L Final     INR   Date Value Ref Range Status   10/05/2023 1.55 (H) 0.85 - 1.15 Final   10/07/2019 1.20 (H) 0.86 - 1.14 Final       DIALYSIS PROCEDURE NOTE  Hepatitis status of previous patient on machine log was checked and verified ok to use with this patients hepatitis status.  Patient dialyzed for 4 hrs. on a K2 bath with a net fluid removal of  2L.  A BFR of 400 ml/min was obtained via a CVC.   The treatment plan was discussed with Dr. Montaño during the treatment.    Total heparin received during the treatment: none units.     Line flushed, clamped and capped with heparin 1:1000 1.6 mL (1600 units) per lumen    Meds  given: midodrine   Complications: none      Person educated: patient. Knowledge base moderate. Barriers to learning: none. Educated on procedure via verbal mode. Patient verbalized  understanding.     ICEBOAT? Timeout performed pre-treatment  I: Patient was identified using 2 identifiers  C:  Consent Signed Yes  E: Equipment preventative maintenance is current and dialysis delivery system OK to use  B: Hepatitis B Surface Antigen: negative; Draw Date: 9/27/23      Hepatitis B Surface Antibody: Susceptible; Draw Date: 9/27/23  O: Dialysis orders present and complete prior to treatment  A: Vascular access verified and assessed prior to treatment  T: Treatment was performed at a clinically appropriate time  ?: Patient was allowed to ask questions and address concerns prior to treatment  See Adult Hemodialysis flowsheet in [facility]ZE for further details and post assessment.  Machine water alarm in place and functioning. Transducer pods intact and checked every 15min.   Pt returned via bed.  Chlorine/Chloramine water system checked every 4 hours.      Patient repositioned every 2 hours during the treatment.

## 2023-10-09 NOTE — CONSULTS
Care Management Initial Consult    General Information  Assessment completed with: Patient, Spouse or significant other, Spouse Ofe  Type of CM/SW Visit: Initial Assessment    Primary Care Provider verified and updated as needed: Yes   Readmission within the last 30 days: previous discharge plan unsuccessful   Return Category: Progression of disease  Reason for Consult: discharge planning  Advance Care Planning: Advance Care Planning Reviewed: no concerns identified     General Information Comments: High readmission risk    Communication Assessment  Patient's communication style: spoken language (English or Bilingual)    Hearing Difficulty or Deaf: no        Cognitive  Cognitive/Neuro/Behavioral: WDL  Level of Consciousness: alert  Arousal Level: arouses to voice  Orientation: oriented x 4  Mood/Behavior: calm, cooperative  Best Language: 0 - No aphasia  Speech: clear    Living Environment:   People in home: spouse     Current living Arrangements: condominium (also has a [place] home that they are remodeling)      Able to return to prior arrangements: other (see comments) (current recommendations are for ARU)  Living Arrangement Comments: patient does have stairs    Family/Social Support:  Care provided by: self, spouse/significant other  Provides care for: no one  Marital Status:   Wife, Sibling(s) (Brother Dylan)  Ofe       Description of Support System: Involved    Support Assessment: Adequate family and caregiver support    Current Resources:   Patient receiving home care services: No     Community Resources: Dialysis Services  Equipment currently used at home: grab bar, toilet, grab bar, tub/shower (has a walker but does not currently use)  Supplies currently used at home: None    Employment/Financial:  Employment Status: self-employed     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  No    Lifestyle & Psychosocial Needs:  Social Determinants of Health     Food Insecurity: Not on file   Depression: Not at risk (7/11/2023)    PHQ-2     PHQ-2 Score: 0   Housing Stability: Not on file   Tobacco Use: Low Risk  (10/3/2023)    Patient History     Smoking Tobacco Use: Never     Smokeless Tobacco Use: Never     Passive Exposure: Not on file   Financial Resource Strain: Not on file   Alcohol Use: Unknown (10/7/2019)    AUDIT-C     Frequency of Alcohol Consumption: Monthly or less     Average Number of Drinks: Not on file     Frequency of Binge Drinking: Not on file   Transportation Needs: Not on file   Physical Activity: Not on file   Interpersonal Safety: Low Risk  (9/21/2023)    Interpersonal Safety     Do you feel physically and emotionally safe where you currently live?: Yes     Within the past 12 months, have you been hit, slapped, kicked or otherwise physically hurt by someone?: No     Within the past 12 months, have you been humiliated or emotionally abused in other ways by your partner or ex-partner?: No   Stress: Not on file   Social Connections: Not on file       Functional Status:  Prior to admission patient needed assistance:   Dependent ADLs:: Independent  Dependent IADLs:: Cleaning, Cooking, Laundry       Mental Health Status:  Mental Health Status: No Current Concerns       Chemical Dependency Status:  Chemical Dependency Status: Past Concern  Chemical Dependency Management: Other (see comment) (Almost one year sobriety)          Values/Beliefs:  Spiritual, Cultural Beliefs, Restoration Practices, Values that affect care: no               Additional Information:  Care Coordinator met with patient and his Spouse Ofe and know the two of them from an admission earlier this year when he was admitted from [facility].  Patient was here 1/21/-4/28 very medically compromised and with delirium requiring a sitter for most of that admission.  Once he started feeling better,  he was bound and determined to transition home, as [facility] would not accept back and we were unable to find a TCU to accept him due to the high cost of his medications.  With the the great help from his spouse and his Brother Dylan, patient transitioned home and was not needing to be admitted for five months.  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  Patient's cognition has improved and he is a good historian.  His spouse is a surgical nurse at [employer] and is only working a few days, so she has been able to help patient.  Patient has multiple co-morbidities that include paroxysmal atrial fibrillation on chronic anticoagulation, chronic pericardial effusion, end stage liver disease s/p liver transplant 2016, on chronic immunosuppression, ESRD on dialysis.  Patient is starting the process of being worked up to see if he would be able to receive a kidney transplant.  He is currently on a M/W/F schedule at [facility].  Patient follows with Dr Simms [facility] for liver health, (transplant 2016).  His abdomen currently looks large.    Therapies are currently recommending ARU.  Will await echo results and Cardiology treatment recommendations before having discussion on ARU if this is still being recommended.  Patient has been using a bipap at night and shares that he feels more rested with using this.  He does not currently have a bipap.    Ofe noted difficulty getting an appointment with Cardiology.  There is an appointment scheduled on 11/6/23.    Care Management will continue to follow and be available to help with any additional needs patient may have.      Rosalba Yoo, RN  Inpatient Care Management  [phone]

## 2023-10-10 ENCOUNTER — TELEPHONE (OUTPATIENT)
Dept: TRANSPLANT | Facility: CLINIC | Age: 58
End: 2023-10-10
Payer: COMMERCIAL

## 2023-10-10 ENCOUNTER — APPOINTMENT (OUTPATIENT)
Dept: OCCUPATIONAL THERAPY | Facility: CLINIC | Age: 58
DRG: 871 | End: 2023-10-10
Payer: COMMERCIAL

## 2023-10-10 ENCOUNTER — APPOINTMENT (OUTPATIENT)
Dept: ULTRASOUND IMAGING | Facility: CLINIC | Age: 58
DRG: 871 | End: 2023-10-10
Attending: HOSPITALIST
Payer: COMMERCIAL

## 2023-10-10 LAB
% LINING CELLS, BODY FLUID: 4 %
ABSOLUTE NEUTROPHILS, BODY FLUID: 11.2 /UL
ALBUMIN BODY FLUID SOURCE: NORMAL
ALBUMIN FLD-MCNC: 1.3 G/DL
ALBUMIN SERPL BCG-MCNC: 3.5 G/DL (ref 3.5–5.2)
ALP SERPL-CCNC: 152 U/L (ref 40–129)
ALT SERPL W P-5'-P-CCNC: 9 U/L (ref 0–70)
ANION GAP SERPL CALCULATED.3IONS-SCNC: 18 MMOL/L (ref 7–15)
APTT PPP: 74 SECONDS (ref 22–38)
AST SERPL W P-5'-P-CCNC: 14 U/L (ref 0–45)
ATRIAL RATE - MUSE: 76 BPM
BACTERIA BLD CULT: NO GROWTH
BACTERIA BLD CULT: NO GROWTH
BILIRUB SERPL-MCNC: 0.9 MG/DL
BUN SERPL-MCNC: 36.9 MG/DL (ref 8–23)
C DIFF TOX B STL QL: NEGATIVE
CALCIUM SERPL-MCNC: 9.2 MG/DL (ref 8.6–10)
CHLORIDE SERPL-SCNC: 94 MMOL/L (ref 98–107)
CREAT SERPL-MCNC: 5.31 MG/DL (ref 0.67–1.17)
DEPRECATED HCO3 PLAS-SCNC: 22 MMOL/L (ref 22–29)
DIASTOLIC BLOOD PRESSURE - MUSE: NORMAL MMHG
EGFRCR SERPLBLD CKD-EPI 2021: 12 ML/MIN/1.73M2
GLUCOSE SERPL-MCNC: 102 MG/DL (ref 70–99)
INTERPRETATION ECG - MUSE: NORMAL
LYMPHOCYTES NFR FLD MANUAL: 77 %
MONOS+MACROS NFR FLD MANUAL: 6 %
NEUTS BAND NFR FLD MANUAL: 1 %
OTHER CELLS FLD MANUAL: 12 %
P AXIS - MUSE: 19 DEGREES
POTASSIUM SERPL-SCNC: 4.5 MMOL/L (ref 3.4–5.3)
PR INTERVAL - MUSE: 184 MS
PROT FLD-MCNC: 3 G/DL
PROT SERPL-MCNC: 6.8 G/DL (ref 6.4–8.3)
PROTEIN BODY FLUID SOURCE: NORMAL
QRS DURATION - MUSE: 76 MS
QT - MUSE: 378 MS
QTC - MUSE: 425 MS
R AXIS - MUSE: 38 DEGREES
SODIUM SERPL-SCNC: 134 MMOL/L (ref 135–145)
SYSTOLIC BLOOD PRESSURE - MUSE: NORMAL MMHG
T AXIS - MUSE: 13 DEGREES
TROPONIN T SERPL HS-MCNC: 307 NG/L
VENTRICULAR RATE- MUSE: 76 BPM

## 2023-10-10 PROCEDURE — 250N000009 HC RX 250: Performed by: RADIOLOGY

## 2023-10-10 PROCEDURE — 99233 SBSQ HOSP IP/OBS HIGH 50: CPT | Performed by: HOSPITALIST

## 2023-10-10 PROCEDURE — 84157 ASSAY OF PROTEIN OTHER: CPT | Performed by: HOSPITALIST

## 2023-10-10 PROCEDURE — 82042 OTHER SOURCE ALBUMIN QUAN EA: CPT | Performed by: HOSPITALIST

## 2023-10-10 PROCEDURE — 0W9G3ZZ DRAINAGE OF PERITONEAL CAVITY, PERCUTANEOUS APPROACH: ICD-10-PCS | Performed by: RADIOLOGY

## 2023-10-10 PROCEDURE — 85730 THROMBOPLASTIN TIME PARTIAL: CPT | Performed by: HOSPITALIST

## 2023-10-10 PROCEDURE — 210N000002 HC R&B HEART CARE

## 2023-10-10 PROCEDURE — 97535 SELF CARE MNGMENT TRAINING: CPT | Mod: GO | Performed by: OCCUPATIONAL THERAPIST

## 2023-10-10 PROCEDURE — 87070 CULTURE OTHR SPECIMN AEROBIC: CPT | Performed by: HOSPITALIST

## 2023-10-10 PROCEDURE — 84478 ASSAY OF TRIGLYCERIDES: CPT | Performed by: PHYSICIAN ASSISTANT

## 2023-10-10 PROCEDURE — 250N000013 HC RX MED GY IP 250 OP 250 PS 637: Performed by: HOSPITALIST

## 2023-10-10 PROCEDURE — 250N000011 HC RX IP 250 OP 636: Mod: JZ | Performed by: HOSPITALIST

## 2023-10-10 PROCEDURE — 272N000706 US PARACENTESIS WITH ALBUMIN

## 2023-10-10 PROCEDURE — 97110 THERAPEUTIC EXERCISES: CPT | Mod: GO | Performed by: OCCUPATIONAL THERAPIST

## 2023-10-10 PROCEDURE — 80053 COMPREHEN METABOLIC PANEL: CPT | Performed by: HOSPITALIST

## 2023-10-10 PROCEDURE — 99232 SBSQ HOSP IP/OBS MODERATE 35: CPT | Performed by: INTERNAL MEDICINE

## 2023-10-10 PROCEDURE — 36415 COLL VENOUS BLD VENIPUNCTURE: CPT | Performed by: HOSPITALIST

## 2023-10-10 PROCEDURE — 89050 BODY FLUID CELL COUNT: CPT | Performed by: HOSPITALIST

## 2023-10-10 PROCEDURE — 36415 COLL VENOUS BLD VENIPUNCTURE: CPT | Performed by: NURSE PRACTITIONER

## 2023-10-10 PROCEDURE — 99223 1ST HOSP IP/OBS HIGH 75: CPT | Mod: FS | Performed by: INTERNAL MEDICINE

## 2023-10-10 PROCEDURE — 250N000012 HC RX MED GY IP 250 OP 636 PS 637: Performed by: INTERNAL MEDICINE

## 2023-10-10 PROCEDURE — 87493 C DIFF AMPLIFIED PROBE: CPT | Performed by: PHYSICIAN ASSISTANT

## 2023-10-10 PROCEDURE — 99233 SBSQ HOSP IP/OBS HIGH 50: CPT | Mod: FS | Performed by: NURSE PRACTITIONER

## 2023-10-10 PROCEDURE — 84484 ASSAY OF TROPONIN QUANT: CPT | Performed by: NURSE PRACTITIONER

## 2023-10-10 RX ORDER — CALCIUM CARBONATE 500 MG/1
500 TABLET, CHEWABLE ORAL 3 TIMES DAILY PRN
Status: DISCONTINUED | OUTPATIENT
Start: 2023-10-10 | End: 2023-10-23 | Stop reason: HOSPADM

## 2023-10-10 RX ORDER — LIDOCAINE HYDROCHLORIDE 10 MG/ML
10 INJECTION, SOLUTION EPIDURAL; INFILTRATION; INTRACAUDAL; PERINEURAL ONCE
Status: COMPLETED | OUTPATIENT
Start: 2023-10-10 | End: 2023-10-10

## 2023-10-10 RX ORDER — ALBUMIN (HUMAN) 12.5 G/50ML
25-50 SOLUTION INTRAVENOUS ONCE
Status: DISCONTINUED | OUTPATIENT
Start: 2023-10-10 | End: 2023-10-14

## 2023-10-10 RX ADMIN — TACROLIMUS 1 MG: 1 CAPSULE ORAL at 09:20

## 2023-10-10 RX ADMIN — SEVELAMER CARBONATE 800 MG: 800 TABLET, FILM COATED ORAL at 12:07

## 2023-10-10 RX ADMIN — PIPERACILLIN AND TAZOBACTAM 2.25 G: 2; .25 INJECTION, POWDER, FOR SOLUTION INTRAVENOUS at 02:21

## 2023-10-10 RX ADMIN — MIDODRINE HYDROCHLORIDE 15 MG: 5 TABLET ORAL at 12:04

## 2023-10-10 RX ADMIN — TACROLIMUS 1 MG: 1 CAPSULE ORAL at 21:11

## 2023-10-10 RX ADMIN — SEVELAMER CARBONATE 800 MG: 800 TABLET, FILM COATED ORAL at 21:12

## 2023-10-10 RX ADMIN — MIDODRINE HYDROCHLORIDE 15 MG: 5 TABLET ORAL at 09:19

## 2023-10-10 RX ADMIN — CALCIUM CARBONATE (ANTACID) CHEW TAB 500 MG 500 MG: 500 CHEW TAB at 01:59

## 2023-10-10 RX ADMIN — OXYCODONE HYDROCHLORIDE AND ACETAMINOPHEN 1 TABLET: 7.5; 325 TABLET ORAL at 22:15

## 2023-10-10 RX ADMIN — GABAPENTIN 300 MG: 300 CAPSULE ORAL at 21:11

## 2023-10-10 RX ADMIN — PIPERACILLIN AND TAZOBACTAM 2.25 G: 2; .25 INJECTION, POWDER, FOR SOLUTION INTRAVENOUS at 09:19

## 2023-10-10 RX ADMIN — Medication 1 CAPSULE: at 09:19

## 2023-10-10 RX ADMIN — AMIODARONE HYDROCHLORIDE 200 MG: 200 TABLET ORAL at 21:12

## 2023-10-10 RX ADMIN — OXYCODONE HYDROCHLORIDE AND ACETAMINOPHEN 1 TABLET: 7.5; 325 TABLET ORAL at 06:27

## 2023-10-10 RX ADMIN — MELATONIN TAB 3 MG 3 MG: 3 TAB at 22:15

## 2023-10-10 RX ADMIN — LACOSAMIDE 100 MG: 50 TABLET, FILM COATED ORAL at 21:12

## 2023-10-10 RX ADMIN — Medication 1 LOZENGE: at 01:59

## 2023-10-10 RX ADMIN — MIDODRINE HYDROCHLORIDE 15 MG: 5 TABLET ORAL at 17:03

## 2023-10-10 RX ADMIN — HYDROMORPHONE HYDROCHLORIDE 0.2 MG: 0.2 INJECTION, SOLUTION INTRAMUSCULAR; INTRAVENOUS; SUBCUTANEOUS at 05:00

## 2023-10-10 RX ADMIN — AMIODARONE HYDROCHLORIDE 200 MG: 200 TABLET ORAL at 09:20

## 2023-10-10 RX ADMIN — GUAIFENESIN 600 MG: 600 TABLET, EXTENDED RELEASE ORAL at 21:11

## 2023-10-10 RX ADMIN — GUAIFENESIN 600 MG: 600 TABLET, EXTENDED RELEASE ORAL at 09:20

## 2023-10-10 RX ADMIN — LIDOCAINE HYDROCHLORIDE 10 ML: 10 INJECTION, SOLUTION EPIDURAL; INFILTRATION; INTRACAUDAL; PERINEURAL at 13:08

## 2023-10-10 RX ADMIN — HEPARIN SODIUM 1200 UNITS/HR: 10000 INJECTION, SOLUTION INTRAVENOUS at 18:46

## 2023-10-10 RX ADMIN — LACOSAMIDE 50 MG: 50 TABLET, FILM COATED ORAL at 09:20

## 2023-10-10 RX ADMIN — ACETAMINOPHEN 650 MG: 325 TABLET, FILM COATED ORAL at 01:59

## 2023-10-10 RX ADMIN — HYDROMORPHONE HYDROCHLORIDE 0.2 MG: 0.2 INJECTION, SOLUTION INTRAMUSCULAR; INTRAVENOUS; SUBCUTANEOUS at 17:16

## 2023-10-10 RX ADMIN — SEVELAMER CARBONATE 800 MG: 800 TABLET, FILM COATED ORAL at 17:03

## 2023-10-10 RX ADMIN — OXYCODONE HYDROCHLORIDE AND ACETAMINOPHEN 1 TABLET: 7.5; 325 TABLET ORAL at 14:21

## 2023-10-10 RX ADMIN — PIPERACILLIN AND TAZOBACTAM 2.25 G: 2; .25 INJECTION, POWDER, FOR SOLUTION INTRAVENOUS at 14:22

## 2023-10-10 RX ADMIN — PIPERACILLIN AND TAZOBACTAM 2.25 G: 2; .25 INJECTION, POWDER, FOR SOLUTION INTRAVENOUS at 21:12

## 2023-10-10 ASSESSMENT — ACTIVITIES OF DAILY LIVING (ADL)
ADLS_ACUITY_SCORE: 35

## 2023-10-10 NOTE — CONSULTS
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 2016 now with evidence of portal hypertension and ascites, paroxysmal atrial fibrillation on Eliquis, DM type 2, history of CVA, hypertension, CHRISTIAN on BiPAP, chronic pericardial effusion, ESRD on dialysis who presented to [facility] ED on 10/5/23 with chest discomfort and palpitations, admitted with severe sepsis with septic shock requiring pressors secondary to multifactorial pneumonia and probable SBP, atrial fibrillation with aberrancy s/p 2 failed attempts at cardioversion, worsening pericardial effusion on ECHO with plans for future pericardial window, and segmental and subsegmental PE on IV heparin. GI consulted for \"liver transplant, developing portal hypertension, initial tap concerning for sbp\".      # Cirrhosis secondary to NAFLD s/p liver transplant 2016 now with evidence of portal hypertension and ascites   # Abdominal pain   - Patient follows with Dr. Simms, though notably has not been seen in years; last clinic visit 4/21/2020 with plan for 6 month follow up. Next appt 12/2023   - Patient presents with palpitations and chest discomfort but also notes worsening abdominal distention and pain that began around the same time. He has required paracentesis in the past but states this was back in 2016.    - PTA on tacrolimus, continued   - Admit labs with normal LFTs, INR 1.55  - CT A/P with \"Prior liver transplant. Multiple upper abdominal collaterals and splenomegaly suggestive of portal hypertension, with small to moderate abdominopelvic ascites.\"  - s/p paracentesis 10/6 with 3L removed with 1119 nucleated cells though no differential is available. Cultures are " negative including aerobic culture, anaerobic culture and fungal.   - Started on Zosyn 10/5-present   - Unclear etiology of portal hypertension and ascites - liver vs heart given cardiac event. LFTs are normal. Discussed with cardiology regarding obtaining right heart pressures and pulmonary pressures.   - Plan to repeat paracentesis today     # Diarrhea  - Loose stools began 10/9  - No hx of Cdiff  - Has been on Zosyn since 10/5 and given one dose of vancomycin 10/5  - Will check Cdiff     # Recurrent pericardial effusion  # Paroxysmal atrial fibrillation   # NSTEMI  - Cardiology following  - ECHO 10/10 with recurrent pleural effusion slightly larger than seen on ECHO 10/8  - Considering heart cath and per patient a stress test. Discussed with cardiology to request right heart pressures and pulmonary pressures     # Pulmonary embolism  - Noted on CT scan   - Currently on heparin gtt    # ESRD on HD  - Nephrology following, HD on MWF  - On kidney transplant list     Recommendations:  - Agree with repeat paracentesis, await fluid analysis including triglycerides   - Check Cdiff    - Agree with cardiac evaluation, discussed with cardiology to please obtain right heart pressures and pulmonary pressures during heart cath   - Continue PTA tacrolimus   - Antibiotics per primary team   - Continue PPI   - Analgesia/Antiemetics per primary team    Gastroenterology follow up recommendations: TBD pending inpatient course    Thank you for involving us in this patient's care. Please do not hesitate to contact the GI service with any questions or concerns.     Pt seen and care plan discussed with Dr. Jang, GI staff physician.      Overall time spent on the date of this encounter preparing to see the patient (including chart review of available notes, clinical status events, imaging and labs); obtaining and/or reviewing separately obtained history; ordering medications, tests or procedures; communicating with other health care  "professionals; and documenting the above clinical information in the electronic medical record was 85 minutes.      Martha Medrano PA-C  GI Service  [facility]  Text Page (Monday-Friday, 8am-4pm)    To page the On-Call [facility] GI provider 24 hours a day, please click [facility] and select GASTROENTEROLOGY MEDICINE ADULT / [facility] in the drop-down menu.   -------------------------------------------------------------------------------------------------------------------       Reason for Consultation:   We were asked by Laura Fernandes, * of medicine to evaluate this patient with \"liver transplant, developing portal hypertension, initial tap concerning for sbp\".     History is obtained from the patient and the medical record.            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/p liver transplant  in 2016 now with evidence of portal hypertension and ascites, paroxysmal atrial fibrillation on Eliquis, DM type 2, history of CVA, hypertension, CHRISTIAN on BiPAP, chronic pericardial effusion, ESRD on dialysis who presented to [facility] ED on 10/5/23 with chest discomfort and palpitations, admitted with severe sepsis with septic shock requiring pressors secondary to multifactorial pneumonia and probable SBP, atrial fibrillation with aberrancy s/p 2 failed attempts at cardioversion, worsening pericardial effusion on ECHO with plans for future pericardial window, and segmental and subsegmental PE on IV heparin.     Patient reports that he developed chest discomfort and palpitations the evening he came into the ED. He reported that he had not had these types of symptoms before. He had a recent hospitalization from 9/27/23-10/1/23 for pericardial effusion s/p pericardiocentesis and drain placement 9/29/23.     History of prolonged hospitalization starting in November 2022 for respiratory arrest, diagnosed with " parainfluenza and strep pneumoniae and acute on chronic renal insufficiency which resulted in ESRD now on dialysis. He was recovering at [facility] and then had a prolonged hospital stay at [facility] from January 2023-April 2023 with loculated pleural effusion.     In the ED, he was afebrile, hypotensive, irregular rhythm with tachycardia and intermittently hypoxic. Lab eval showed INR 1.55, K 3.8, Cr 5.77, normal LFTs, trop 346, ammonia 14, WBC 9.9, hgb 11.4, plts 146. Imaging obtained with CT chest PE, A/P which showed large pericardial effusion, findings consistent with pulmonary arterial hypertension, segmental and subsegmental pulmonary artery emboli, small bilateral pleural effusions, bilateral lung consolidations suspicious for multifocal pneumonia, prior liver transplant with multiple upper abdominal collaterals and splenomegaly suggestive of portal hypertension.     Currently, the patient reports that he has ongoing abdominal pain. The pain initially got better after recent paracentesis 4 days ago. He reports worsening distention in the last 5-6 days that corresponded to his chest pain, palpitations and shortness of breath. He denies nausea, vomiting. He reports diarrhea with loose stools multiple times since yesterday. Typically has 1-2 formed stools at baseline. No black or bloody stools. He has been afebrile.     Previous Procedures:  Paracentesis 10/6/23 with 3L cloudy, turbid milky fluid aspirated into vacuum bottles. Fluid sent for infection, LDH, glucose and chylomicrons     Colonoscopy on 10/2019 with Dr. Chong  Impression:          One large (13mm) pedunculated cecal polyp was resected                        with hot snare after lifting and clipped at base but                        unfortunately was not retrieved as polyp trap was not                        on. One small transverse polyp was resected with cold                        snare.                        - One 13 mm polyp in the cecum,  removed using                        injection-lift and a hot snare. Complete resection.                        Polyp tissue not retrieved.                        - One 3 mm polyp in the transverse colon, removed with a                        cold snare. Resected and retrieved.                        - The examined portion of the ileum was normal.                        - The examination was otherwise normal on direct and                        retroflexion views.                        - Internal hemorrhoids.     EGD on 6/2016 with [facility]  Impression:  2 columns of small varices without stigmata of bleeding            Past Medical History:   Reviewed and edited as appropriate  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    Cancer (H) 2023    Squamous Cell Cancer- Since resolved    Cirrhosis of liver with ascites (H) 02/11/2016    Coagulopathy (H24)     Critical illness myopathy     Dialysis patient (H24)     DIALYSIS 3X/ WEEK    Encephalopathy     ESRD (end stage renal disease) on dialysis (H)     Gastroesophageal reflux disease     H/O alcohol abuse     History of blood transfusion     Hyperammonemia (H24)     Hyperlipidemia     Hypertension     Patients states that HTN has been resolved    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    Insomnia     Lactic acidosis 11/12/2022    Liver replaced by transplant (H) 09/20/2016    NAFLD (nonalcoholic fatty liver disease) 02/11/2016    CHRISTIAN on CPAP     Parainfluenza type 1 infection 11/19/2022    Parapneumonic effusion     Pleural effusion     Pneumothorax on left 12/16/2022    Respiratory acidosis 11/12/2022    Respiratory arrest (H) 11/12/2022    Restless legs syndrome (RLS)     SBP (spontaneous bacterial peritonitis)  (H)     MNGI    Seizures (H) 12/2022    Sepsis due to Streptococcus pneumoniae with acute hypoxic respiratory failure (H) 11/19/2022    Stroke, embolic (H) 11/20/2022    Sudden cardiac arrest (H) 12/29/2022    PEA- 2 min of CPR    Tubular adenoma 10/2019    Large cecal adenoma- due for surveillance colonoscopy in 3 years (10/2022)            Past Surgical History:   Reviewed and edited as appropriate   Past Surgical History:   Procedure Laterality Date    APPENDECTOMY      BENCH LIVER N/A 09/20/2016    Procedure: BENCH LIVER;  Surgeon: Enoc Crews MD;  Location: [facility] OR    BRONCHOSCOPY FLEXIBLE AND RIGID N/A 12/20/2022    Procedure: BRONCHOSCOPY;  Surgeon: Alena Valenzuela MD;  Location:  GI    COLONOSCOPY  08/06/2013    repeat in 2018    COLONOSCOPY N/A 10/04/2019    Procedure: COLONOSCOPY, WITH POLYPECTOMY AND BIOPSY;  Surgeon: Go Chong MD;  Location: [facility] OR    CREATE FISTULA ARTERIOVENOUS UPPER EXTREMITY Left 03/21/2023    Procedure: LEFT UPPER EXTREMITY ARTERIOVENOUS FISTULA CREATION. LIGATION OF COMPETING VASCULAR BRANCHES- LEFT;  Surgeon: Deejay Mcneil MD;  Location:  OR    CV PERICARDIOCENTESIS N/A 9/29/2023    Procedure: Pericardiocentesis;  Surgeon: Barry Mckeon MD;  Location:  HEART CARDIAC CATH LAB    HERNIA REPAIR      IR CHEST TUBE PLACEMENT NON-TUNNELED RIGHT  12/12/2022    IR CVC TUNNEL PLACEMENT > 5 YRS OF AGE  12/06/2022    IR DIALYSIS FISTULOGRAM LEFT  07/13/2023    IR GASTROSTOMY TUBE CHANGE  02/08/2023    IR GASTROSTOMY TUBE PERCUTANEOUS PLCMNT  11/29/2022    IR PARACENTESIS  11/29/2022    IR PARACENTESIS  12/01/2022    IR THORACENTESIS  12/06/2022    IR THORACENTESIS  09/01/2020    IR THORACENTESIS  08/10/2020    IR TRANSCATHETER BIOPSY  12/01/2022    REVISION FISTULA ARTERIOVENOUS UPPER EXTREMITY Left 8/15/2023    Procedure: 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 2; OUTFLOW REVISION CEPHALIC TO BRACHIAL VEIN;  Surgeon: Deejay Mcneil  MD Ministerio;  Location: [facility] OR    TRACHEOSTOMY N/A 2022    Procedure: TRACHEOSTOMY;  Surgeon: Nilesh Jackson MD;  Location:  OR    TRANSPLANT LIVER RECIPIENT  DONOR N/A 2016    Procedure: TRANSPLANT LIVER RECIPIENT  DONOR;  Surgeon: Enoc Crews MD;  Location: [facility] OR              Social History:   Reviewed and edited as appropriate  Social History     Socioeconomic History    Marital status:      Spouse name: Not on file    Number of children: Not on file    Years of education: Not on file    Highest education level: Not on file   Occupational History    Not on file   Tobacco Use    Smoking status: Never    Smokeless tobacco: Never   Vaping Use    Vaping Use: Never used   Substance and Sexual Activity    Alcohol use: Not Currently     Alcohol/week: 0.0 standard drinks of alcohol    Drug use: No    Sexual activity: Not Currently   Other Topics Concern    Parent/sibling w/ CABG, MI or angioplasty before 65F 55M? Not Asked   Social History Narrative    Not on file     Social Determinants of Health     Financial Resource Strain: Not on file   Food Insecurity: Not on file   Transportation Needs: Not on file   Physical Activity: Not on file   Stress: Not on file   Social Connections: Not on file   Interpersonal Safety: Low Risk  (2023)    Interpersonal Safety     Do you feel physically and emotionally safe where you currently live?: Yes     Within the past 12 months, have you been hit, slapped, kicked or otherwise physically hurt by someone?: No     Within the past 12 months, have you been humiliated or emotionally abused in other ways by your partner or ex-partner?: No   Housing Stability: Not on file              Family History:   Patient's family history is reviewed today and is non-contributory  Family History   Problem Relation Age of Onset    Coronary Artery Disease No family hx of     Cardiomyopathy No family hx of             Allergies:   Reviewed and  edited as appropriate   No Known Allergies         Medications:     Medications Prior to Admission   Medication Sig Dispense Refill Last Dose    acetaminophen (TYLENOL) 325 MG tablet Take 2 tablets (650 mg) by mouth every 4 hours as needed for other (For optimal non-opioid multimodal pain management to improve pain control.)   prn at prn    apixaban ANTICOAGULANT (ELIQUIS) 5 MG tablet Take 1 tablet (5 mg) by mouth 2 times daily for 90 days 180 tablet 1 10/4/2023    gabapentin (NEURONTIN) 100 MG capsule Take 1 capsule (100 mg) by mouth daily 30 capsule 2 10/4/2023    hydrOXYzine (ATARAX) 25 MG tablet Take 1 tablet (25 mg) by mouth 3 times daily as needed for itching 90 tablet 0 prn at prn    Lacosamide (VIMPAT) 100 MG TABS tablet Take 1 tablet (100 mg) by mouth every evening 31 tablet 5 10/4/2023    lacosamide (VIMPAT) 50 MG TABS tablet Take 50 mg by mouth every morning   10/4/2023    melatonin 3 MG tablet Take 1 tablet (3 mg) by mouth At Bedtime 90 tablet 1 10/4/2023    midodrine (PROAMATINE) 10 MG tablet Take 1 tab 3xDay, during patient's dialysis days , Monday, Wednesday, Friday, patient takes 2 extra Midodrine one hour before dialysis, takes 1 more Midodrine when he gets there, and takes 1 more tablet during dialysis, this is an addition to patient's scheduled doses 150 tablet 1 10/4/2023    multivitamin RENAL (TRIPHROCAPS) 1 capsule capsule Take 1 capsule by mouth daily 30 capsule 1 10/4/2023    oxyCODONE (ROXICODONE) 5 MG tablet Take 1 tablet (5 mg) by mouth every 4 hours as needed for moderate pain 6 tablet 0 prn at prn    rOPINIRole (REQUIP) 0.5 MG tablet Take 0.5 mg by mouth 2 times daily as needed (restless legs)   prn at prn    sevelamer carbonate (RENVELA) 800 MG tablet Take 800 mg by mouth 4 times daily With meals and snacks   10/4/2023    tacrolimus (GENERIC EQUIVALENT) 1 MG capsule Take 1 capsule (1 mg) by mouth every 12 hours 60 capsule 1 10/4/2023             Review of Systems:     A complete review  of systems was performed and is negative except as noted in the HPI             Physical Exam:   Temp: 97.9  F (36.6  C) Temp src: Oral BP: 116/79 Pulse: 82   Resp: 18 SpO2: 94 % O2 Device: Nasal cannula Oxygen Delivery: 1 LPM  Wt:   Wt Readings from Last 2 Encounters:   10/10/23 92.8 kg (204 lb 8 oz)   10/03/23 86.2 kg (190 lb)        General: 58 year old male in NAD.  Answers appropriately.    HEENT: Head is AT/NC. Sclera anicteric. No conjunctival injection.  Oropharynx is clear, moist    Neck: Supple  Lungs: Non labored breathing on supplemental O2   Heart: Regular rate   Abdomen: Soft, mild generalized tenderness, distended. No rebound or peritoneal signs  Extremities: No pedal edema.  Skin: No jaundice or rash  Neurologic: Grossly non-focal. No asterixis           Data:   Labs and imaging below were independently reviewed and interpreted    LAB WORK:    BMP  Recent Labs   Lab 10/10/23  0551 10/09/23  1109 10/08/23  0608 10/07/23  0743 10/07/23  0431   * 134* 134*  --  138   POTASSIUM 4.5 3.9 5.2  --  4.2   CHLORIDE 94* 95* 96*  --  97*   KELLY 9.2 8.7 8.8  --  8.6   CO2 22 23 24  --  25   BUN 36.9* 36.1* 54.8*  --  40.9*   CR 5.31* 4.80* 6.91*  --  5.45*   * 124* 90  94   < > 136*    < > = values in this interval not displayed.     CBC  Recent Labs   Lab 10/09/23  1109 10/08/23  0608 10/06/23  0533 10/05/23  0251 10/05/23  0245   WBC 7.9 6.0 6.6  --  9.9   RBC 2.68* 2.89* 3.11*  --  3.68*   HGB 8.4* 9.0* 9.5* 12.6* 11.4*   HCT 26.4* 28.2* 29.2* 37* 35.2*   MCV 99 98 94  --  96   MCH 31.3 31.1 30.5  --  31.0   MCHC 31.8 31.9 32.5  --  32.4   RDW 14.9 14.6 14.6  --  14.3    106* 117*  --  146*     INR  Recent Labs   Lab 10/05/23  0245   INR 1.55*     LFTs  Recent Labs   Lab 10/10/23  0551 10/09/23  1109 10/08/23  0608 10/07/23  0431   ALKPHOS 152* 152* 136* 166*   AST 14 13 11 9   ALT 9 7 7 8   BILITOTAL 0.9 0.7 0.6 0.6   PROTTOTAL 6.8 7.0 6.1* 6.1*   ALBUMIN 3.5 3.5 3.0* 3.4*      PANCNo lab  results found in last 7 days.    MELD 3.0: 22 at 10/7/2023  4:31 AM  MELD-Na: 25 at 10/7/2023  4:31 AM  Calculated from:  Serum Creatinine: On dialysis. Using the maximum value.  Serum Sodium: 138 mmol/L (Using max of 137 mmol/L) at 10/7/2023  4:31 AM  Total Bilirubin: 0.6 mg/dL (Using min of 1 mg/dL) at 10/7/2023  4:31 AM  Serum Albumin: 3.4 g/dL at 10/7/2023  4:31 AM  INR(ratio): 1.55 at 10/5/2023  2:45 AM  Age at listin years  Sex: Male at 10/7/2023  4:31 AM      CULTURES:   7-Day Micro Results       Collected Updated Procedure Result Status      10/06/2023 2110 10/06/2023 2211 Albumin fluid [[id number]]    Ascites Fluid from Abdomen    Final result Component Value Units   Albumin Fluid Source Peritoneum    Albumin fluid 1.6 g/dL            10/06/2023 2110 10/06/2023 2211 Protein fluid [[id number]]    Ascites Fluid from Abdomen    Final result Component Value Units   Protein Fluid Source Peritoneum    Protein Total Fluid 3.0 g/dL            10/06/2023 2110 10/09/2023 0829 Cytology non gyn [[id number]]   Ascites Fluid from Abdomen    In process Component Value   No component results            10/06/2023 2050 10/06/2023 2301 Cell count with differential fluid [[id number]]    (Abnormal)   Peritoneal Fluid from Abdomen    In process Component Value   No component results            10/06/2023 2050 10/10/2023 0707 Peritoneal Fluid Aerobic Bacterial Culture Routine with Gram Stain [[id number]]   Peritoneal Fluid from Abdomen    Preliminary result Component Value   Culture No growth after 3 days  [P]    Gram Stain Result No organisms seen  [P]     2+ WBC seen  [P]                10/06/2023 2050 10/09/2023 2346 Anaerobic Bacterial Culture Routine [[id number]]   Ascites Fluid from Abdomen    Preliminary result Component Value   Culture No anaerobic organisms isolated after 3 days  [P]                10/06/2023 2050 10/09/2023 2331 Fungal or Yeast Culture Routine [[id number]]   Ascites Fluid from  Peritoneum    Preliminary result Component Value   Culture No growth after 3 days  [P]                10/06/2023 2050 10/06/2023 2137 Lactate dehydrogenase fluid [[id number]]    Ascites Fluid from Abdomen    Final result Component Value Units   LD Fluid Source Peritoneum    Lactate dehydrogenase fluid 51 U/L            10/06/2023 2050 10/06/2023 2136 Glucose fluid [[id number]]    Ascites Fluid from Abdomen    Final result Component Value Units   Glucose Fluid Source Peritoneum    Glucose fluid 174 mg/dL            10/06/2023 2050 10/06/2023 2301 Cell Count Body Fluid [[id number]]    (Abnormal)   Peritoneal Fluid from Abdomen    Final result Component Value Units   Color Pink    Clarity Turbid    Body Fluid Comment       Cell Count Fluid Source Peritoneum    Total Nucleated Cells 1,119 /uL            10/06/2023 2050 10/06/2023 2055 Differential Body Fluid [[id number]]   Peritoneal Fluid from Abdomen    In process Component Value   No component results            10/05/2023 0727 10/05/2023 0925 Symptomatic Influenza A/B, RSV, & SARS-CoV2 PCR (COVID-19) Nasopharyngeal [[id number]]    Swab from Nasopharyngeal    Final result Component Value   Influenza A PCR Negative   Influenza B PCR Negative   RSV PCR Negative   SARS CoV2 PCR Negative   NEGATIVE: SARS-CoV-2 (COVID-19) RNA not detected, presumed negative.            10/05/2023 0328 10/10/2023 0431 Blood Culture Peripheral Blood [[id number]]   Peripheral Blood    Final result Component Value   Culture No Growth               10/05/2023 0328 10/10/2023 0431 Blood Culture Peripheral Blood [[id number]]   Peripheral Blood    Final result Component Value   Culture No Growth                        IMAGING:  CT Chest PE, A/P on 10/5/23  IMPRESSION:  1.  Large pericardial effusion. This measures 25 mm in greatest depth which is suggestive of at least 500 mL of fluid.  2.  Pulmonary arteries are increased in size with the main pulmonary artery measuring 39 mm. This  is consistent with pulmonary arterial hypertension. Segmental and subsegmental pulmonary artery emboli are seen to the left upper lobe. No definite right   heart strain.  3.  Small bilateral pleural effusions with the right effusion appearing chronically loculated with pleural thickening. Bilateral lung consolidations right greater than left suspicious for multifocal pneumonia.  4.  Prior liver transplant. Multiple upper abdominal collaterals and splenomegaly suggestive of portal hypertension, with small to moderate abdominopelvic ascites.  5.  Prominent gas and fluid-filled loops of small and large bowel likely reactive ileus.    US LE on 10/5/23  IMPRESSION:   The right CFV and GSV are unable to be assessed due to the existing  arterial line. Exam is otherwise negative for deep vein thrombosis  bilaterally.    US RUE on 10/5/23  IMPRESSION:   1. Negative for right upper extremity DVT.  2. Short segment DVT in the right cephalic vein at the antecubital  fossa, which may be related to prior intravenous access.    US LUE on 10/5/23  IMPRESSION:  1. Negative for DVT in the left upper extremity.  2. Patent AV fistula with good blood flow volume of 1920 mL/min.  Moderate stenosis in the mid to upper outflow cephalic vein with  diameter reduced to 2.6 mm.    CXR on 10/5/23  IMPRESSION: Cardiac enlargement. Dual-lumen central line tip right atrium. Bibasilar atelectasis or infiltrate and small effusions.               =======================================================================

## 2023-10-10 NOTE — PROGRESS NOTES
[facility]    Medicine Progress Note - Hospitalist Service    Date of Admission:  10/5/2023    Assessment & Plan     Blanco Osborne is a 58 year old male with extensive medical history that includes liver transplant in 2016 due to alcoholic liver disease, now with progressive cirrhosis and ascites, paroxysmal atrial fibrillation, on anticoagulation with Eliquis, diabetes mellitus type 2, previous CVA, hypertension, CHRISTIAN on BiPAP, chronic pericardial effusion, ESRD on hemodialysis who presented on 10/5/2023 with palpitations and chest discomfort.       He was hypotensive and had wide-complex tachycardia felt to be atrial fibrillation with aberrancy.  He failed 2 attempts of emergent cardioversion.  He was started on amiodarone and subsequently converted to sinus rhythm. He is now on po amiodarone     He received IV fluids but remained hypotensive and subsequently received pressors that were discontinued on 10/7. He had severe sepsis likely due to SBP and pneumonia. Blood and ascitic fluid cultures were negative. He was intially on vancomycin and zosyn. Vancomycin was discontinued on 10/8.     Troponins were elevated, felt to be demand ischemia.  Echo showed pericardial effusion without tamponade.  CT C/A/P showed PE and pulmonary infiltrates.  He was started on IV heparin.      Acute hypoxic respiratory failure  - multifactorial, Requiring supplemental oxygen, currently 2 L/min  - continue supplemental oxygen, wean as able     Severe sepsis with septic shock - due to SBP and multifactorial pneumonia -resolved  Chronic hypotension, on midodrine  -Is normally on midodrine for chronic hypotension.    -Initially presented with septic shock which has now resolved.  Off pressors since 10/7/2023.  Blood pressure currently in normal range  -Continue midodrine for chronic hypotension     End-stage liver disease, s/p liver transplant in 2016, now with recurrent ascites and Portal  hypertension  Probable SBP, s/p paracentesis  - peritoneal fluid was turbid with 1119 nucleated cells, no differential available and still pending and triglyceride levels on 10/6 on the fluid was normal  - culture negative for now including aerobic culture, anaerobic culture and fungal  - on IV zosyn, continue   - On immunosuppression with tacrolimus and  levels ordered   - On exam is full again and I have ordered paracentesis with labs  -I do not have a good explanation why patient is developing recurrent ascites and I spoke with the [facility] team and discussed with them and they will be adding triglycerides to his fluid labs and as per my discussion patient has not followed with them in the past 3 years     Multifocal pneumonia, organism unspecified  - CT scan showed pulmonary infiltrates   - COVID 19/influenza/RSV negative  - blood cultures negative till date from 10/5  - unable to provide sputum specimen  -vancomycin was discontinued on 10/8  - continue IV zosyn for now and will plan for total of 7 to 10 days based on culture data     Pulmonary embolism in segmental and subsegmental pulmonary arteries of left upper lobe, without cor pulmonale -  Short segment  thrombophlebitis in right cephalic vein at antecubital fossa, likely related to prior intravenous access  -Eliquis held on admission and was started on IV heparin  - Anticoagulation with apixaban was interrupted for at least 2 days during previous hospitalization for pericardiocentesis.  Will transition back to apixaban when no more procedures required   - No DVT in extremities  -Continue with heparin drip for now     Recurrent chronic pericardial effusion, s/p pericardiocentesis on 9/29.  Drain was removed on 9/30.   - Felt to be due to volume overload due to renal failure and cirrhosis  - Repeat TTE 10/7 showed moderate pericardial effusion, slightly larger than seen on 10/5, with no evidence of tamponade physiology  -Repeat echo on 10/9 showed  moderate to large bloody cardial effusion and as compared to 10/7 it has increased, IVC is mildly dilated, mild RV diastolic collapse and has inflow velocity variation consistent with hemodynamic compromise  -Cardiology has been following the patient and Dr. Mac saw the patient yesterday and today and discussed with him and initially they were thinking pericardiocentesis drain versus pericardial window and as per my conversation plan is to do ischemic work-up likely angiogram tomorrow along with pericardiocentesis drain  -We will keep him n.p.o. after midnight    Recurrent chest discomfort  -He has been having on and off chest discomfort and initially during the course of hospitalization his troponin did peak at 346 on 10/5 and it was thought to be due to demand because of shock and EKG did not had any ST elevation and patient had rapid response yesterday and repeat troponin yesterday showed troponins have trended down to 307  -Echo as above  -Given his high risk and comorbidities he does need ischemic work-up and as per my discussion with cardiology most likely plan for cardiac cath tomorrow       Chronic paroxysmal atrial fibrillation, on anticoagulation with Eliquis  Wide-complex tachycardia on admission felt to be atrial fibrillation with aberrant conduction, failed cardioversion x 2  -Patient was started on amiodarone and converted to normal sinus rhythm and his Eliquis was held and he was started on heparin drip  -Cardiology has been following the patient and we will continue with the heparin drip today and plan is to transition to oral Eliquis when no more procedures are required     ESRD, on hemodialysis q. Monday, Wednesday, Friday  S/p left AV fistula pseudoaneurysm repair, 8/2023  His AV fistula on the left upper extremity infiltrated on 10/6-  - continue dialysis per nephrology and continue with renal multivitamin and sevelamer and discussed with Dr. Montaño from nephrology       Small bilateral pleural  effusions  Chronically loculated right pleural effusion  - stable         Generalized anxiety disorder  Restless leg syndrome  -On hydroxyzine and ropinirole        Chronic anemia-due to anemia of chronic disease  -Hemoglobin is baseline between 8-9  -Hemoglobin is stable today at 8.4 on 10/9  -We will check labs intermittently     chronic thrombocytopenia-Resolved  -Secondary to cirrhosis  -Platelet count this morning is stable at 152 on 10/9    Seizure disorder  - continue Vimpat     GERD  -Continue PPI     Probable CHRISTIAN  --Per patient's wife, he had a sleep study about 10 years ago but does not have any CPAP or BiPAP at home  -Did use BiPAP last night  -Need to  undergo sleep study as outpatient after discharge     Previous embolic strokes     Generalized weakness  -Continue with physical therapy and Occupational Therapy.     Pain at multiple locations  -Has pain in left upper extremity due to infiltration of AV fistula, pain in right upper extremity due to thrombophlebitis and frequent lab draws.  Also complains of diffuse pain in shoulders and upper back  -continue Tylenol as needed  -IV Dilaudid 0.2 mg every 6 hours as needed and Percocet every 8 hours as needed             Diet: Snacks/Supplements Adult: Gelatein Plus; Between Meals  Renal Diet (dialysis)    DVT Prophylaxis: Heparin drip  Nixon Catheter: Not present  Lines: PRESENT      Hemodialysis Vascular Access Right Subclavian-Site Assessment: WDL  Hemodialysis Vascular Access Arteriovenous fistula Left Forearm-Site Assessment: Bruit present;Thrill present      Cardiac Monitoring: ACTIVE order. Indication: Tachyarrhythmias, acute (48 hours)  Code Status: Full Code      Clinically Significant Risk Factors              # Hypoalbuminemia: Lowest albumin = 3 g/dL at 10/8/2023  6:08 AM, will monitor as appropriate         # Hypertension: Noted on problem list        # Overweight: Estimated body mass index is 27.74 kg/m  as calculated from the following:     Height as of this encounter: 1.829 m (6').    Weight as of this encounter: 92.8 kg (204 lb 8 oz).               Disposition Plan      Expected Discharge Date: 10/12/2023      Destination: home with family;inpatient rehabilitation facility  Discharge Comments: 10-10 pericardiocentesis vs pericardial window          Deloris Hamilton MD  Hospitalist Service  [facility]  Securely message with [employer] (more info)  Text page via [employer] Paging/Directory   _    Patient is critically ill and his prognosis is guarded  _____________________________________________________________________    Interval History     Seen this morning and his brother and wife are present.  Cardiology had already seen the patient and I discussed with patient about the plan and he agrees.  He still needing oxygen on and off.  He denies any chest discomfort but mentioned that it has gotten better.  He denies any tingling or numbness.  He does feel that his abdomen is full and does need fluid to be removed again    Nephrology team was also present in the room and I discussed with them and the patient and plan is to do paracentesis with repeat labs today and he agrees.  Patient had a lot of questions which answered to satisfaction.  His wife and brother were in agreement with plan of care.    Of note he still has pain over the fistula site where infiltration happened and pain meds are available for the patient        Physical Exam   Vital Signs: Temp: 97.9  F (36.6  C) Temp src: Oral BP: 116/78 Pulse: 72   Resp: 18 SpO2: 94 % O2 Device: Nasal cannula Oxygen Delivery: 1 LPM  Weight: 204 lbs 8 oz        General: Patient appears comfortable and in no acute distress.  Respiratory: Lungs are clear to auscultation bilaterally with no wheeze or crackles and has right-sided permacath  Cardiovascular: Regular rate , S1 and S2 normal with no murmer or rubs or gallops  Abdomen:   soft , non tender , mild distended distended and bowel sound  present   Skin: No skin rashes   Neurologic:  No facial droop  Musculoskeletal: Normal Range of motion over upper and lower extremities bilaterally   Psychiatric: cooperative          Medical Decision Making       Time spent in care of patient is 55 minutes and I discussed plan of care with the patient, nurse and also discussed with his wife and discussed with nephrology, cardiology with Dr. vu, GI team     Data     I have personally reviewed the following data over the past 24 hrs:    N/A  \   N/A   / N/A     134 (L) 94 (L) 36.9 (H) /  102 (H)   4.5 22 5.31 (H) \     ALT: 9 AST: 14 AP: 152 (H) TBILI: 0.9   ALB: 3.5 TOT PROTEIN: 6.8 LIPASE: N/A     Trop: 307 (HH) BNP: N/A     INR:  N/A PTT:  74 (H)   D-dimer:  N/A Fibrinogen:  N/A       Imaging results reviewed over the past 24 hrs:   Recent Results (from the past 24 hour(s))   Echocardiogram Limited    Narrative    [id number]  [id number]  [id number]  [id number]^[email or web address]^DONI     [facility]  Echocardiography Laboratory  [address] [place] [address]     Name: MARY JO CRAIN  MRN: [id number]  : 1965  Study Date: 10/09/2023 03:36 PM  Age: 58 yrs  Gender: Male  Patient Location: [facility]  Reason For Study: Pericardial Effusion  Ordering Physician: DONI WORLEY  Performed By: Kelly Myers     ______________________________________________________________________________  Procedure  Limited Portable Echo Adult.  ______________________________________________________________________________  Interpretation Summary     Moderate to large pericardial effusion, slightly larger compared to the study  from 2 days ago. Measures 2.5 cm posteriorly. IVC is mildly dilated. Mild RV  diastolic collapse and has inflow velocitiies variation consistent with early  hemodynamic compromise. Correlate clinically. Reported findings to Dr. Hamilton  [facility] medicine MD.  ______________________________________________________________________________  Left  Ventricle  Left ventricular systolic function is normal.     Right Ventricle  The right ventricle is normal in size and function.  ______________________________________________________________________________  MMode/2D Measurements & Calculations  asc Aorta Diam: 3.8 cm     ______________________________________________________________________________  Report approved by: Tereso Mayfield 10/09/2023 04:14 PM         US Paracentesis with Albumin    Narrative    ULTRASOUND PARACENTESIS WITH ALBUMIN 10/10/2023 1:35 PM     HISTORY: Status post liver transplant has portal hypertension. Had tap  in ICU.    FINDINGS: Ultrasound was used to evaluate for the presence and best  approach for paracentesis. Written and oral informed consent was  obtained. A pause for the cause procedure to verify the correct  patient and correct procedure. The skin overlying the left lower  quadrant was prepped and draped in the usual sterile fashion. The  subcutaneous tissues were anesthetized with 10 mL 1% lidocaine. A  catheter was advanced into the peritoneal space and 4.65 L of  straw  colored fluid was drained. There were no immediate complications.  Ultrasound images were permanently stored.  Patient left the  ultrasound suite in satisfactory condition.      Impression    IMPRESSION: Technically successful paracentesis without immediate  complications.

## 2023-10-10 NOTE — TELEPHONE ENCOUNTER
Call to Blanco .  No answer.  LM asking him to speak to his primary team - asking for GI consult if he wants to talk to GI med / transplant team.

## 2023-10-10 NOTE — PROGRESS NOTES
"SPIRITUAL HEALTH SERVICES Progress Note  [facility]    Saw pt Blanco Osborne per introductory visit for length of stay.    Patient/Family Understanding of Illness and Goals of Care - Blanco shared that he has \"fluid building up\" and the medical team is trying to determine the cause. He had a paracentesis earlier this afternoon and is feeling a bit better.    Distress and Loss   Blanco shared that following his liver transplant 7 years ago, he was doing well until the last year when he's had multiple and lengthy hospitalizations.  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    Strengths, Coping, and Resources   Blanco has strong family support, especially from his wife and brother.  Blanco shared regarding feelings of gratitude and responsibility as a recipient of organ donation.     Meaning, Beliefs, and Spirituality - \"You're looking at a miracle and proof that prayer works.\" He reflected on these last 7 years as opportunity to live more intentionally and \"figure out what God wants from me.\"    Visit ended when Blanco's wife and brother called. Blanco welcomes spiritual health support.    Plan of Care - Will follow up in 1-3 days.     Linda Garcia MDiv  Associate     [facility] routine referrals *[phone]     [facility] available 24/7 for emergent requests/referrals, either by paging the on-call  or by entering an ASAP/STAT consult in Epic (this will also page the on-call ).     "

## 2023-10-10 NOTE — PROGRESS NOTES
[facility] Cardiology Progress Note  Date of Service: 10/10/2023  Staff Cardiologist: Dr. 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     Interval History:   Patient remains chest pain free today, no clinical signs of tamponade. He is hemodynamically stable.     Assessment:   Recurrent pericardial effusion   - Patient underwent pericardiocentesis on 9/29, follow-up echo with trivial effusion   - Echo (10/8) revealed recurrent moderate pericardial effusion, repeat echo 10/10 with slightly larger effusion   - No clinical signs of tamponade, no RV collapse  - Recurrent effusions likely 2/2 ESRD and progressive liver disease     NSTEMI  - Patient presented with chest discomfort  - Troponin 346 on admission  - EKG with no ST or TWI    Paroxysmal atrial fibrillation   - Presented with wide-complex tachycardiac felt to be Afib s/p failed DCCV x 2 in ED  - Subsequently converted to sinus on amiodarone, now on oral amiodarone  - On heparin gtt [PTA apixaban 5 mg BID]    ESRD on HD  - Nephrology following, HD on MWF   - Awaiting renal transplant     Pulmonary embolism  - CT CAP showed PE, on heparin gtt     5. NAFLD s/p liver transplant in 2016, now with recurrent ascites and portal hypertension  - s/p paracentesis x 2 this admission  - GI following, appreciate assistance    Plan:  Plan for coronary angiogram, RHC, and pericardiocentesis tomorrow, 10/11.   RHC will help determine cause of portal hypertension and ascites, cardiac  vs liver etiology.     Darline Frazier, CNP  Pager:  [phone]  (7am - 5pm, M-F)      Physical Exam   Temp: 97.9  F (36.6  C) Temp src: Oral BP: 116/78 Pulse: 72   Resp: 18 SpO2: 94 % O2 Device: Nasal cannula Oxygen Delivery: 1 LPM  Vitals:    10/08/23 0000 10/09/23 0620 10/10/23 0520   Weight: 92.6 kg (204 lb 2.3 oz) 94.2 kg (207 lb 11.2 oz) 92.8 kg (204 lb 8 oz)       Constitutional:   NAD   Skin:   Warm and dry   Head:   Nontraumatic   Neck:   no JVD   Lungs:   normal   Cardiovascular:   regular rate and rhythm   Abdomen:   Benign   Extremities and Back:   No edema   Neurological:   Grossly nonfocal       Medications    heparin 1,200 Units/hr (10/09/23 2231)    - MEDICATION INSTRUCTIONS -        - MEDICATION INSTRUCTIONS for Dialysis Patients -   Does not apply See Admin Instructions    albumin human  25-50 g Intravenous Once    amiodarone  200 mg Oral BID    gabapentin  300 mg Oral At Bedtime    guaiFENesin  600 mg Oral BID    Lacosamide  100 mg Oral QPM    lacosamide  50 mg Oral QAM    melatonin  3 mg Oral At Bedtime    midodrine  10 mg Oral Once in dialysis/CRRT    midodrine  15 mg Oral TID w/meals    multivitamin RENAL  1 capsule Oral Daily    pantoprazole  40 mg Oral QAM AC    piperacillin-tazobactam  2.25 g Intravenous Q6H    sevelamer carbonate  800 mg Oral 4x Daily    tacrolimus  1 mg Oral Q12H       Data     Most Recent 3 CBC's:  Recent Labs   Lab Test 10/09/23  1109 10/08/23  0608 10/06/23  0533   WBC 7.9 6.0 6.6   HGB 8.4* 9.0* 9.5*   MCV 99 98 94    106* 117*     Most Recent 3 BMP's:  Recent Labs   Lab Test 10/10/23  0551 10/09/23  1109 10/08/23  0608   * 134* 134*   POTASSIUM 4.5 3.9 5.2   CHLORIDE 94* 95* 96*   CO2 22 23 24   BUN 36.9* 36.1* 54.8*   CR 5.31* 4.80* 6.91*   ANIONGAP 18* 16* 14   KELLY 9.2 8.7 8.8   * 124* 90  94     Most Recent 3 Troponin's:No lab results found.      Medical Decision Making       40 MINUTES SPENT BY ME on the date of service doing chart  review, history, exam, documentation & further activities per the note.        Clinically Significant Risk Factors              # Hypoalbuminemia: Lowest albumin = 3 g/dL at 10/8/2023  6:08 AM, will monitor as appropriate         # Hypertension: Noted on problem list        # Overweight: Estimated body mass index is 27.74 kg/m  as calculated from the following:    Height as of this encounter: 1.829 m (6').    Weight as of this encounter: 92.8 kg (204 lb 8 oz).

## 2023-10-10 NOTE — TELEPHONE ENCOUNTER
Patient Call: General  Route to LPN    Reason for call: Blanco stated he's in the hospital, with fluid build up, and would like for the transplant team to round on him while he's in the hospital, and would like a call,    Call back needed? Yes    Return Call Needed  Same as documented in contacts section  When to return call?: Same day: Route High Priority

## 2023-10-10 NOTE — PLAN OF CARE
Pt here with Pericardial effusion, PNA. A&Ox4, appears anxious at times. VSS on 1L NC. Tele NSR. NPO since midnight ex sips of water with meds. Up with SBA. Continues to c/o moderate chest discomfort, relieved with pain medication. PIV infusing Heparin 1200 units/hr in goal range, recheck tomrrow.. Plan Pericardiocentesis vs pericardial window with cardiology today.

## 2023-10-10 NOTE — PLAN OF CARE
Paracentesis completed in US today. A&O x4, forgetful. VSS on RA after procedure. Up w/ Ax1. Tele: NSR. Heparin gtt running therapeutic. Given percocet, dilaudid for generalized pain. Tolerating renal diet. Takes pills whole. Alarms on. Stool sample pending, enteric precautions initiated for CDIFF rule out. Plan for pericardiocentesis and angiogram tomorrow.

## 2023-10-10 NOTE — PROGRESS NOTES
Renal Medicine Progress Note            Assessment/Plan:     1.  ESKD. MWF HD. No UF on HD yesterday. Volume excess may be only in abd, pericardial spaces and not easily amenable to removal with HD.      Will plan to rest left AVF with infiltration 10//6 and continue CVC for now.     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  Sepsis syndrome.  Now off pressors. ON zosyn for possible SBP.  Improved hemodynamics on 15mg mg TID midodrine. addition dose 10mg mid run during HD yesterday.      3.  PE.  Pt is on a heparin gtt.       4.  Anemia: 8.4 today.            Plan/Recs:  1) Plan HD tomorrow via CVC, 2kg UF goal  2) agree with paracentesis      Torsten Montaño DO  [facility]  Office: [phone]  Cell: [phone]        Interval History:     Pt with good session dialysis yesterday, had 2Kg uf and with good hemodynamics. Subsequent RRT for chest pains. Transferred floors . TTE with percardial effusion slighlty larger.    Today, feeing some dyspnea, feels like related to worsening abd distension.           Medications and Allergies:      - MEDICATION INSTRUCTIONS for Dialysis Patients -   Does not apply See Admin Instructions    albumin human  25-50 g Intravenous Once    amiodarone  200 mg Oral BID    gabapentin  300 mg Oral At Bedtime    guaiFENesin  600 mg Oral BID    Lacosamide  100 mg Oral QPM    lacosamide  50 mg Oral QAM    melatonin  3 mg Oral At Bedtime    midodrine  10 mg Oral Once in dialysis/CRRT    midodrine  15 mg Oral TID w/meals    multivitamin RENAL  1 capsule Oral Daily    pantoprazole  40 mg Oral QAM AC    piperacillin-tazobactam  2.25 g Intravenous Q6H    sevelamer carbonate  800 mg Oral 4x Daily    tacrolimus  1 mg Oral Q12H      No Known Allergies         Physical Exam:   Vitals were reviewed  /79 (BP Location: Right arm)   Pulse 82   Temp 97.9  F (36.6  C) (Oral)   Resp 18   Ht 1.829 m (6')   Wt 92.8 kg (204 lb 8 oz)   SpO2 94%   BMI 27.74  kg/m      Wt Readings from Last 3 Encounters:   10/10/23 92.8 kg (204 lb 8 oz)   10/03/23 86.2 kg (190 lb)   10/01/23 87.9 kg (193 lb 11.2 oz)       Intake/Output Summary (Last 24 hours) at 10/10/2023 1052  Last data filed at 10/9/2023 1400  Gross per 24 hour   Intake 400 ml   Output 2000 ml   Net -1600 ml       GENERAL APPEARANCE: alert, oriented  HEENT:  Eyes/ears/nose/neck grossly normal  RESP: lungs cta b c good efforts, no crackles, rhonchi or wheezes  CV: RRR, nl S1/S2, distant, no murmurs  ABDOMEN: distended  EXTREMITIES/SKIN: no c/c/rashes/lesions; 1+ b/l leg edema  NEURO:  non-focal           Data:     BMP  Recent Labs   Lab 10/10/23  0551 10/09/23  1109 10/08/23  0608 10/07/23  0743 10/07/23  0431   * 134* 134*  --  138   POTASSIUM 4.5 3.9 5.2  --  4.2   CHLORIDE 94* 95* 96*  --  97*   KELLY 9.2 8.7 8.8  --  8.6   CO2 22 23 24  --  25   BUN 36.9* 36.1* 54.8*  --  40.9*   CR 5.31* 4.80* 6.91*  --  5.45*   * 124* 90  94   < > 136*    < > = values in this interval not displayed.     CBC  Recent Labs   Lab 10/09/23  1109 10/08/23  0608 10/06/23  0533 10/05/23  0251 10/05/23  0245   WBC 7.9 6.0 6.6  --  9.9   HGB 8.4* 9.0* 9.5* 12.6* 11.4*   HCT 26.4* 28.2* 29.2* 37* 35.2*   MCV 99 98 94  --  96    106* 117*  --  146*     Lab Results   Component Value Date    AST 14 10/10/2023    ALT 9 10/10/2023    ALKPHOS 152 (H) 10/10/2023    BILITOTAL 0.9 10/10/2023    CHANDLER 14 (L) 10/05/2023     Lab Results   Component Value Date    INR 1.55 (H) 10/05/2023       Attestation:  I have reviewed today's vital signs, notes, medications, labs and imaging.    DO [facility] - Nephrology  Office: [phone]  Cell: [phone]

## 2023-10-11 ENCOUNTER — TELEPHONE (OUTPATIENT)
Dept: GASTROENTEROLOGY | Facility: CLINIC | Age: 58
End: 2023-10-11
Payer: COMMERCIAL

## 2023-10-11 ENCOUNTER — APPOINTMENT (OUTPATIENT)
Dept: CARDIOLOGY | Facility: CLINIC | Age: 58
DRG: 871 | End: 2023-10-11
Attending: INTERNAL MEDICINE
Payer: COMMERCIAL

## 2023-10-11 LAB
ACT BLD: 180 SECONDS (ref 74–150)
ALBUMIN BODY FLUID SOURCE: NORMAL
ALBUMIN FLD-MCNC: 2.7 G/DL
ALBUMIN SERPL BCG-MCNC: 3.5 G/DL (ref 3.5–5.2)
ALP SERPL-CCNC: 130 U/L (ref 40–129)
ALT SERPL W P-5'-P-CCNC: 8 U/L (ref 0–70)
ANION GAP SERPL CALCULATED.3IONS-SCNC: 16 MMOL/L (ref 7–15)
APPEARANCE FLD: ABNORMAL
APTT PPP: 60 SECONDS (ref 22–38)
AST SERPL W P-5'-P-CCNC: 10 U/L (ref 0–45)
BACTERIA PRT CULT: NO GROWTH
BILIRUB SERPL-MCNC: 0.8 MG/DL
BUN SERPL-MCNC: 50.8 MG/DL (ref 8–23)
CALCIUM SERPL-MCNC: 9 MG/DL (ref 8.6–10)
CELL COUNT BODY FLUID SOURCE: ABNORMAL
CHLORIDE SERPL-SCNC: 92 MMOL/L (ref 98–107)
CHYLO FLD QL: ABNORMAL
COLOR FLD: ABNORMAL
CREAT SERPL-MCNC: 7.01 MG/DL (ref 0.67–1.17)
DEPRECATED HCO3 PLAS-SCNC: 24 MMOL/L (ref 22–29)
EGFRCR SERPLBLD CKD-EPI 2021: 8 ML/MIN/1.73M2
EOSINOPHIL NFR FLD MANUAL: 4 %
ERYTHROCYTE [DISTWIDTH] IN BLOOD BY AUTOMATED COUNT: 15.2 % (ref 10–15)
GLUCOSE BLDC GLUCOMTR-MCNC: 107 MG/DL (ref 70–99)
GLUCOSE BLDC GLUCOMTR-MCNC: 181 MG/DL (ref 70–99)
GLUCOSE BODY FLUID SOURCE: NORMAL
GLUCOSE FLD-MCNC: 79 MG/DL
GLUCOSE SERPL-MCNC: 136 MG/DL (ref 70–99)
GRAM STAIN RESULT: NORMAL
HCO3 BLDV-SCNC: 30 MMOL/L (ref 21–28)
HCT VFR BLD AUTO: 27.3 % (ref 40–53)
HGB BLD-MCNC: 8.6 G/DL (ref 13.3–17.7)
LACTATE BLD-SCNC: 0.4 MMOL/L
LYMPHOCYTES NFR FLD MANUAL: 16 %
MCH RBC QN AUTO: 30.9 PG (ref 26.5–33)
MCHC RBC AUTO-ENTMCNC: 31.5 G/DL (ref 31.5–36.5)
MCV RBC AUTO: 98 FL (ref 78–100)
MONOS+MACROS NFR FLD MANUAL: 18 %
NEUTS BAND NFR FLD MANUAL: 62 %
PATH REPORT.COMMENTS IMP SPEC: NORMAL
PATH REPORT.FINAL DX SPEC: NORMAL
PATH REPORT.GROSS SPEC: NORMAL
PATH REPORT.MICROSCOPIC SPEC OTHER STN: NORMAL
PATH REPORT.RELEVANT HX SPEC: NORMAL
PCO2 BLDV: 60 MM HG (ref 40–50)
PH BLDV: 7.3 [PH] (ref 7.32–7.43)
PLATELET # BLD AUTO: 183 10E3/UL (ref 150–450)
PO2 BLDV: 33 MM HG (ref 25–47)
POTASSIUM SERPL-SCNC: 4.8 MMOL/L (ref 3.4–5.3)
PROT FLD-MCNC: 4.9 G/DL
PROT SERPL-MCNC: 6.6 G/DL (ref 6.4–8.3)
PROTEIN BODY FLUID SOURCE: NORMAL
RBC # BLD AUTO: 2.78 10E6/UL (ref 4.4–5.9)
SAO2 % BLDV: 57 % (ref 94–100)
SODIUM SERPL-SCNC: 132 MMOL/L (ref 135–145)
TACROLIMUS BLD-MCNC: 8 UG/L (ref 5–15)
TME LAST DOSE: NORMAL H
TME LAST DOSE: NORMAL H
TRIGL FLD-MCNC: 503 MG/DL
WBC # BLD AUTO: 10.1 10E3/UL (ref 4–11)
WBC # FLD AUTO: 2558 /UL

## 2023-10-11 PROCEDURE — 99153 MOD SED SAME PHYS/QHP EA: CPT | Performed by: INTERNAL MEDICINE

## 2023-10-11 PROCEDURE — 250N000011 HC RX IP 250 OP 636: Mod: JZ | Performed by: HOSPITALIST

## 2023-10-11 PROCEDURE — 250N000013 HC RX MED GY IP 250 OP 250 PS 637: Performed by: HOSPITALIST

## 2023-10-11 PROCEDURE — 250N000009 HC RX 250: Performed by: INTERNAL MEDICINE

## 2023-10-11 PROCEDURE — 85730 THROMBOPLASTIN TIME PARTIAL: CPT | Performed by: HOSPITALIST

## 2023-10-11 PROCEDURE — 85347 COAGULATION TIME ACTIVATED: CPT

## 2023-10-11 PROCEDURE — 85027 COMPLETE CBC AUTOMATED: CPT | Performed by: HOSPITALIST

## 2023-10-11 PROCEDURE — 272N000001 HC OR GENERAL SUPPLY STERILE: Performed by: INTERNAL MEDICINE

## 2023-10-11 PROCEDURE — 84157 ASSAY OF PROTEIN OTHER: CPT | Performed by: INTERNAL MEDICINE

## 2023-10-11 PROCEDURE — 82042 OTHER SOURCE ALBUMIN QUAN EA: CPT | Performed by: INTERNAL MEDICINE

## 2023-10-11 PROCEDURE — C1887 CATHETER, GUIDING: HCPCS | Performed by: INTERNAL MEDICINE

## 2023-10-11 PROCEDURE — 250N000012 HC RX MED GY IP 250 OP 636 PS 637: Performed by: INTERNAL MEDICINE

## 2023-10-11 PROCEDURE — 99152 MOD SED SAME PHYS/QHP 5/>YRS: CPT | Performed by: INTERNAL MEDICINE

## 2023-10-11 PROCEDURE — 99233 SBSQ HOSP IP/OBS HIGH 50: CPT | Mod: FS | Performed by: NURSE PRACTITIONER

## 2023-10-11 PROCEDURE — 80197 ASSAY OF TACROLIMUS: CPT | Performed by: INTERNAL MEDICINE

## 2023-10-11 PROCEDURE — 88112 CYTOPATH CELL ENHANCE TECH: CPT | Mod: 26

## 2023-10-11 PROCEDURE — 999N000064 ECHOCARDIOGRAM LIMITED

## 2023-10-11 PROCEDURE — 90937 HEMODIALYSIS REPEATED EVAL: CPT

## 2023-10-11 PROCEDURE — 250N000013 HC RX MED GY IP 250 OP 250 PS 637: Performed by: INTERNAL MEDICINE

## 2023-10-11 PROCEDURE — C1894 INTRO/SHEATH, NON-LASER: HCPCS | Performed by: INTERNAL MEDICINE

## 2023-10-11 PROCEDURE — 99152 MOD SED SAME PHYS/QHP 5/>YRS: CPT | Mod: GC | Performed by: INTERNAL MEDICINE

## 2023-10-11 PROCEDURE — 88305 TISSUE EXAM BY PATHOLOGIST: CPT | Mod: 26

## 2023-10-11 PROCEDURE — 87205 SMEAR GRAM STAIN: CPT | Performed by: INTERNAL MEDICINE

## 2023-10-11 PROCEDURE — 84311 SPECTROPHOTOMETRY: CPT | Performed by: INTERNAL MEDICINE

## 2023-10-11 PROCEDURE — 88305 TISSUE EXAM BY PATHOLOGIST: CPT | Mod: TC | Performed by: INTERNAL MEDICINE

## 2023-10-11 PROCEDURE — 93451 RIGHT HEART CATH: CPT | Performed by: INTERNAL MEDICINE

## 2023-10-11 PROCEDURE — 258N000003 HC RX IP 258 OP 636: Performed by: INTERNAL MEDICINE

## 2023-10-11 PROCEDURE — 82945 GLUCOSE OTHER FLUID: CPT | Performed by: INTERNAL MEDICINE

## 2023-10-11 PROCEDURE — 33017 PRCRD DRG 6YR+ W/O CGEN CAR: CPT | Mod: GC | Performed by: INTERNAL MEDICINE

## 2023-10-11 PROCEDURE — 89050 BODY FLUID CELL COUNT: CPT | Performed by: INTERNAL MEDICINE

## 2023-10-11 PROCEDURE — 99233 SBSQ HOSP IP/OBS HIGH 50: CPT | Performed by: INTERNAL MEDICINE

## 2023-10-11 PROCEDURE — 33017 PRCRD DRG 6YR+ W/O CGEN CAR: CPT | Performed by: INTERNAL MEDICINE

## 2023-10-11 PROCEDURE — 82803 BLOOD GASES ANY COMBINATION: CPT

## 2023-10-11 PROCEDURE — 93451 RIGHT HEART CATH: CPT | Mod: 26 | Performed by: INTERNAL MEDICINE

## 2023-10-11 PROCEDURE — 250N000011 HC RX IP 250 OP 636: Performed by: INTERNAL MEDICINE

## 2023-10-11 PROCEDURE — 36415 COLL VENOUS BLD VENIPUNCTURE: CPT | Performed by: HOSPITALIST

## 2023-10-11 PROCEDURE — 99233 SBSQ HOSP IP/OBS HIGH 50: CPT | Performed by: HOSPITALIST

## 2023-10-11 PROCEDURE — 87070 CULTURE OTHR SPECIMN AEROBIC: CPT | Performed by: INTERNAL MEDICINE

## 2023-10-11 PROCEDURE — 210N000002 HC R&B HEART CARE

## 2023-10-11 PROCEDURE — C1769 GUIDE WIRE: HCPCS | Performed by: INTERNAL MEDICINE

## 2023-10-11 PROCEDURE — 80053 COMPREHEN METABOLIC PANEL: CPT | Performed by: HOSPITALIST

## 2023-10-11 RX ORDER — SODIUM CHLORIDE 9 MG/ML
INJECTION, SOLUTION INTRAVENOUS CONTINUOUS
Status: DISCONTINUED | OUTPATIENT
Start: 2023-10-11 | End: 2023-10-11

## 2023-10-11 RX ORDER — LORAZEPAM 2 MG/ML
0.5 INJECTION INTRAMUSCULAR
Status: DISCONTINUED | OUTPATIENT
Start: 2023-10-11 | End: 2023-10-11 | Stop reason: HOSPADM

## 2023-10-11 RX ORDER — POTASSIUM CHLORIDE 1500 MG/1
20 TABLET, EXTENDED RELEASE ORAL
Status: DISCONTINUED | OUTPATIENT
Start: 2023-10-11 | End: 2023-10-11 | Stop reason: HOSPADM

## 2023-10-11 RX ORDER — MIDODRINE HYDROCHLORIDE 5 MG/1
10 TABLET ORAL
Status: COMPLETED | OUTPATIENT
Start: 2023-10-11 | End: 2023-10-11

## 2023-10-11 RX ORDER — FENTANYL CITRATE 50 UG/ML
INJECTION, SOLUTION INTRAMUSCULAR; INTRAVENOUS
Status: DISCONTINUED | OUTPATIENT
Start: 2023-10-11 | End: 2023-10-11 | Stop reason: HOSPADM

## 2023-10-11 RX ORDER — ASPIRIN 81 MG/1
243 TABLET, CHEWABLE ORAL ONCE
Status: DISCONTINUED | OUTPATIENT
Start: 2023-10-11 | End: 2023-10-11 | Stop reason: HOSPADM

## 2023-10-11 RX ORDER — LORAZEPAM 0.5 MG/1
0.5 TABLET ORAL
Status: DISCONTINUED | OUTPATIENT
Start: 2023-10-11 | End: 2023-10-11 | Stop reason: HOSPADM

## 2023-10-11 RX ORDER — ASPIRIN 325 MG
325 TABLET ORAL ONCE
Status: DISCONTINUED | OUTPATIENT
Start: 2023-10-11 | End: 2023-10-11 | Stop reason: HOSPADM

## 2023-10-11 RX ORDER — LIDOCAINE 40 MG/G
CREAM TOPICAL
Status: DISCONTINUED | OUTPATIENT
Start: 2023-10-11 | End: 2023-10-11 | Stop reason: HOSPADM

## 2023-10-11 RX ADMIN — AMIODARONE HYDROCHLORIDE 200 MG: 200 TABLET ORAL at 20:25

## 2023-10-11 RX ADMIN — OXYCODONE HYDROCHLORIDE AND ACETAMINOPHEN 1 TABLET: 7.5; 325 TABLET ORAL at 18:16

## 2023-10-11 RX ADMIN — MIDODRINE HYDROCHLORIDE 10 MG: 5 TABLET ORAL at 11:11

## 2023-10-11 RX ADMIN — GUAIFENESIN 600 MG: 600 TABLET, EXTENDED RELEASE ORAL at 20:25

## 2023-10-11 RX ADMIN — MELATONIN TAB 3 MG 3 MG: 3 TAB at 22:22

## 2023-10-11 RX ADMIN — CALCIUM CARBONATE (ANTACID) CHEW TAB 500 MG 500 MG: 500 CHEW TAB at 11:08

## 2023-10-11 RX ADMIN — SODIUM CHLORIDE 250 ML: 9 INJECTION, SOLUTION INTRAVENOUS at 10:31

## 2023-10-11 RX ADMIN — ACETAMINOPHEN 650 MG: 325 TABLET, FILM COATED ORAL at 20:25

## 2023-10-11 RX ADMIN — TACROLIMUS 1 MG: 1 CAPSULE ORAL at 18:07

## 2023-10-11 RX ADMIN — OXYCODONE HYDROCHLORIDE AND ACETAMINOPHEN 1 TABLET: 7.5; 325 TABLET ORAL at 06:09

## 2023-10-11 RX ADMIN — SEVELAMER CARBONATE 800 MG: 800 TABLET, FILM COATED ORAL at 18:06

## 2023-10-11 RX ADMIN — PIPERACILLIN AND TAZOBACTAM 2.25 G: 2; .25 INJECTION, POWDER, FOR SOLUTION INTRAVENOUS at 18:05

## 2023-10-11 RX ADMIN — LACOSAMIDE 100 MG: 50 TABLET, FILM COATED ORAL at 18:06

## 2023-10-11 RX ADMIN — MIDODRINE HYDROCHLORIDE 15 MG: 5 TABLET ORAL at 08:04

## 2023-10-11 RX ADMIN — GABAPENTIN 300 MG: 300 CAPSULE ORAL at 22:22

## 2023-10-11 RX ADMIN — PIPERACILLIN AND TAZOBACTAM 2.25 G: 2; .25 INJECTION, POWDER, FOR SOLUTION INTRAVENOUS at 22:23

## 2023-10-11 RX ADMIN — HYDROMORPHONE HYDROCHLORIDE 0.2 MG: 0.2 INJECTION, SOLUTION INTRAMUSCULAR; INTRAVENOUS; SUBCUTANEOUS at 20:25

## 2023-10-11 RX ADMIN — PIPERACILLIN AND TAZOBACTAM 2.25 G: 2; .25 INJECTION, POWDER, FOR SOLUTION INTRAVENOUS at 05:30

## 2023-10-11 RX ADMIN — HEPARIN SODIUM 1200 UNITS/HR: 10000 INJECTION, SOLUTION INTRAVENOUS at 23:27

## 2023-10-11 RX ADMIN — SODIUM CHLORIDE 300 ML: 9 INJECTION, SOLUTION INTRAVENOUS at 10:31

## 2023-10-11 RX ADMIN — MIDODRINE HYDROCHLORIDE 15 MG: 5 TABLET ORAL at 18:07

## 2023-10-11 ASSESSMENT — ACTIVITIES OF DAILY LIVING (ADL)
ADLS_ACUITY_SCORE: 35

## 2023-10-11 NOTE — PLAN OF CARE
Goal Outcome Evaluation:    Pt. Sent to dialysis at 0800. Pt. Requested Midodrine before dialysis. Pt. Requests to take other am meds after dialysis. Pt. Sent to cath lab from dialysis for right heart cath and pericardiocentesis.

## 2023-10-11 NOTE — PROGRESS NOTES
[facility] Cardiology Progress Note  Date of Service: 10/11/2023  Staff Cardiologist: Dr. 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     Interval History:   Patient is stable, denies pain. Groin site and RIJ site intact. Hemodynamically stable.     Assessment:   Recurrent pericardial effusion   - Patient underwent pericardiocentesis on 9/29, follow-up echo with trivial effusion   - Echo (10/8) revealed recurrent moderate pericardial effusion, repeat echo 10/10 with slightly larger effusion   - No clinical signs of tamponade, no RV collapse  - Recurrent effusions likely 2/2 ESRD and progressive liver disease   - s/p pericardiocentesis today with 560 mL removed    NSTEMI  - Patient presented with chest discomfort, EKG unremarkable  - Troponin 346 > 307    Paroxysmal atrial fibrillation   - Presented with wide-complex tachycardiac felt to be Afib s/p failed DCCV x 2 in ED  - Subsequently converted to sinus on amiodarone, now on oral amiodarone  - On heparin gtt [PTA apixaban 5 mg BID]    ESRD on HD  - Nephrology following, HD on MWF   - Awaiting renal transplant     Pulmonary embolism  - CT CAP showed PE, on heparin gtt     5. NAFLD s/p liver transplant in 2016, now with recurrent ascites and portal hypertension  - s/p paracentesis x 2 this admission  - RHC showed portal hypertension with pressure gradient of 10 mmHg.     Plan:  Will need ischemic evaluation inpatient vs outpatient pending clinical status.    Darline MEJIA  Stenach, CNP  Pager:  [phone]  (7am - 5pm, M-F)      Physical Exam   Temp: 97.7  F (36.5  C) Temp src: Oral BP: 92/70 Pulse: 84   Resp: 16 SpO2: 98 % O2 Device: Nasal cannula Oxygen Delivery: 2 LPM  Vitals:    10/09/23 0620 10/10/23 0520 10/11/23 0529   Weight: 94.2 kg (207 lb 11.2 oz) 92.8 kg (204 lb 8 oz) 89.4 kg (197 lb 3.2 oz)       Constitutional:   NAD   Skin:   Warm and dry   Head:   Nontraumatic   Neck:   no JVD   Lungs:   normal   Cardiovascular:   regular rate and rhythm   Abdomen:   Benign   Extremities and Back:   No edema   Neurological:   Grossly nonfocal       Medications    heparin 1,200 Units/hr (10/11/23 0800)    - MEDICATION INSTRUCTIONS -      - MEDICATION INSTRUCTIONS -        - MEDICATION INSTRUCTIONS for Dialysis Patients -   Does not apply See Admin Instructions    albumin human  25-50 g Intravenous Once    amiodarone  200 mg Oral BID    aspirin  325 mg Oral Once    Or    aspirin  243 mg Oral Once    gabapentin  300 mg Oral At Bedtime    guaiFENesin  600 mg Oral BID    Lacosamide  100 mg Oral QPM    lacosamide  50 mg Oral QAM    melatonin  3 mg Oral At Bedtime    midodrine  10 mg Oral Once in dialysis/CRRT    midodrine  15 mg Oral TID w/meals    multivitamin RENAL  1 capsule Oral Daily    pantoprazole  40 mg Oral QAM AC    piperacillin-tazobactam  2.25 g Intravenous Q6H    sevelamer carbonate  800 mg Oral 4x Daily    sodium chloride (PF)  3 mL Intracatheter Q8H    tacrolimus  1 mg Oral Q12H       Data     Most Recent 3 CBC's:  Recent Labs   Lab Test 10/11/23  0626 10/09/23  1109 10/08/23  0608   WBC 10.1 7.9 6.0   HGB 8.6* 8.4* 9.0*   MCV 98 99 98    152 106*     Most Recent 3 BMP's:  Recent Labs   Lab Test 10/11/23  0626 10/10/23  0551 10/09/23  1109   * 134* 134*   POTASSIUM 4.8 4.5 3.9   CHLORIDE 92* 94* 95*   CO2 24 22 23   BUN 50.8* 36.9* 36.1*   CR 7.01* 5.31* 4.80*   ANIONGAP 16* 18* 16*   KELLY 9.0 9.2 8.7   * 102* 124*     Most Recent 3 Troponin's:No lab  results found.      Medical Decision Making       40 MINUTES SPENT BY ME on the date of service doing chart review, history, exam, documentation & further activities per the note.        Clinically Significant Risk Factors              # Hypoalbuminemia: Lowest albumin = 3 g/dL at 10/8/2023  6:08 AM, will monitor as appropriate         # Hypertension: Noted on problem list        # Overweight: Estimated body mass index is 26.75 kg/m  as calculated from the following:    Height as of this encounter: 1.829 m (6').    Weight as of this encounter: 89.4 kg (197 lb 3.2 oz).

## 2023-10-11 NOTE — PLAN OF CARE
VSS, SR, 3L NC. Dialysis this morning then RHC and pericadiocentesis completed this afternoon. L groin and L neck venous sites from RHC. Off bedrest 1745. Has sat up in bed to eat but not yet out of bed per his request.  L chest pericardial drain is painful, PRN percocet given. Resuming heparin tonight for PE.

## 2023-10-11 NOTE — TELEPHONE ENCOUNTER
PER STAFF MESSAGE BELOW PT IS OK TO PROCEED WITH SCHEDULING.CALLED PT NUMBER REACHED WIFE (GEORGE) SHE ADVISED PT IS STILL IN THE HOSPITAL AND THEY WERE GOING TO CHECK AND SEE IF HE CAN HAVE PROCEDURE DONE WILL ADMITTED. IF UNABLE THEY WILL CALL BACK TO SCHEDULE. PT HAS OTHER HEART RELATED ISSUES THAT ARE ON GOING THEY ARE ATTENDING TO.

## 2023-10-11 NOTE — PLAN OF CARE
Goal Outcome Evaluation:  A&O x4, VSS on RA. Tele SR. Heparin infusing at 1200 units/hr. Pain managed with prn percocet. Ambulated independently  in hallways last night. Stool results :C-diff negative. NPO, Plans for RHC and pericardiocentesis today.

## 2023-10-11 NOTE — PROGRESS NOTES
"    GASTROENTEROLOGY PROGRESS NOTE    Date of Admission: 10/5/2023  Reason for Admission: Chest pain       ASSESSMENT:  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 2016 now with evidence of portal hypertension and ascites, paroxysmal atrial fibrillation on Eliquis, DM type 2, history of CVA, hypertension, CHRISTIAN on BiPAP, chronic pericardial effusion, ESRD on dialysis who presented to [facility] ED on 10/5/23 with chest discomfort and palpitations, admitted with severe sepsis with septic shock requiring pressors secondary to multifactorial pneumonia, atrial fibrillation with aberrancy s/p 2 failed attempts at cardioversion, worsening pericardial effusion on ECHO with plans for future pericardial window, and segmental and subsegmental PE on IV heparin. GI consulted for \"liver transplant, developing portal hypertension, initial tap concerning for sbp\".      # Cirrhosis secondary to NAFLD s/p liver transplant 2016 now with evidence of portal hypertension and ascites   # Chylous ascites   # Abdominal pain   - Patient follows with Dr. Simms, though notably has not been seen in years; last clinic visit 4/21/2020 with plan for 6 month follow up. Next appt 12/2023   - Patient presents with palpitations and chest discomfort but also notes worsening abdominal distention and pain that began around the same time. He has required paracentesis in the past but states this was back in 2016.    - PTA on tacrolimus, continued. Tacro level pending   - Admit labs with normal LFTs other than mildly elevated alk phos which has essentially normalized today, INR 1.55  - CT A/P with \"Prior liver transplant. Multiple upper abdominal collaterals and splenomegaly suggestive of portal hypertension, with small to moderate abdominopelvic ascites.\"  - s/p paracentesis 10/6 with 3L removed with 1119 nucleated cells, differential returned and not consistent with SBP, triglycerides >400 " consistent with chylous ascites. Cytology negative for malignancy. Cultures are negative including aerobic culture, anaerobic culture and fungal.   - Started on Zosyn 10/5-present, continuing for pneumonia per hospitalist  - Unclear etiology of portal hypertension and ascites - liver vs high right sided heart pressures. LFTs are normal other than slightly elevated alk phos which has improved. Discussed with cardiology regarding obtaining right heart pressures and pulmonary artery pressures, RHC planned for today.   - s/p repeat paracentesis 10/10: cell count with diff pending, triglycerides pending, and cultures pending      # Diarrhea  - Loose stools began 10/9  - No hx of Cdiff  - Has been on Zosyn since 10/5 and given one dose of vancomycin 10/5  - Cdiff negative 10/10     # Recurrent pericardial effusion  # Paroxysmal atrial fibrillation   # NSTEMI  - Cardiology following  - ECHO 10/10 with recurrent pleural effusion slightly larger than seen on ECHO 10/8  - Angiogram, right heart cath and pericardiocentesis planned for today      # Pulmonary embolism  - Noted on CT scan   - Currently on heparin gtt     # ESRD on HD  - Nephrology following, HD on MWF  - On kidney transplant list      RECOMMENDATIONS:  - Await paracentesis fluid analysis  - Await RHC findings   - Further recs pending above findings   - Continue PTA tacrolimus  - Continue PPI       GI will continue to follow     Thank you for involving us in this patient's care. Please do not hesitate to contact the GI service with any questions or concerns.     Overall time spent on the date of this encounter preparing to see the patient (including chart review of available notes, clinical status events, imaging and labs); obtaining and/or reviewing separately obtained history; ordering medications, tests or procedures; communicating with other health care professionals; and documenting the above clinical information in the electronic medical record was 45  minutes.    Martha Medrano PA-C  GI Service  [facility]  Text Page (Monday-Friday, 8am-4pm)    To page the On-Call [facility] GI provider 24 hours a day, please click [facility] and select GASTROENTEROLOGY MEDICINE ADULT / [facility] in the drop-down menu.   _______________________________________________________________      Subjective: Nursing notes and 24hr events reviewed. Attempted to see patient twice however he was out of his room this morning and now is in cardiac procedures.     ROS:   Not obtained    Medications:  Current Facility-Administered Medications   Medication    - MEDICATION INSTRUCTIONS for Dialysis Patients -    acetaminophen (TYLENOL) tablet 650 mg    Or    acetaminophen (TYLENOL) Suppository 650 mg    albumin human 25 % injection 25-50 g    amiodarone (PACERONE) tablet 200 mg    benzocaine-menthol (CHLORASEPTIC) 6-10 MG lozenge 1 lozenge    calcium carbonate (TUMS) chewable tablet 500 mg    camphor-menthol (DERMASARRA) lotion    glucose gel 15-30 g    Or    dextrose 50 % injection 25-50 mL    Or    glucagon injection 1 mg    gabapentin (NEURONTIN) capsule 300 mg    guaiFENesin (MUCINEX) 12 hr tablet 600 mg    heparin infusion 25,000 units in D5W 250 mL ANTICOAGULANT    HYDROmorphone (DILAUDID) injection 0.2 mg    hydrOXYzine (ATARAX) tablet 25 mg    Lacosamide (VIMPAT) tablet 100 mg    lacosamide (VIMPAT) tablet 50 mg    melatonin tablet 3 mg    midodrine (PROAMATINE) tablet 10 mg    midodrine (PROAMATINE) tablet 10 mg    midodrine (PROAMATINE) tablet 15 mg    multivitamin RENAL (TRIPHROCAPS) capsule 1 capsule    naloxone (NARCAN) injection 0.2 mg    Or    naloxone (NARCAN) injection 0.4 mg    Or    naloxone (NARCAN) injection 0.2 mg    Or    naloxone (NARCAN) injection 0.4 mg    No heparin via hemodialysis machine    oxyCODONE-acetaminophen (PERCOCET) 7.5-325 MG per tablet 1 tablet    pantoprazole (PROTONIX) EC tablet 40 mg    Patient is already receiving anticoagulation  with heparin, enoxaparin (LOVENOX), warfarin (COUMADIN)  or other anticoagulant medication    piperacillin-tazobactam (ZOSYN) 2.25 g vial to attach to  ml bag    QUEtiapine (SEROquel) tablet 50 mg    rOPINIRole (REQUIP) tablet 0.5 mg    sevelamer carbonate (RENVELA) tablet 800 mg    sodium chloride 0.9% BOLUS 100-150 mL    sodium chloride 0.9% BOLUS 250 mL    sodium chloride 0.9% BOLUS 300 mL    tacrolimus (GENERIC) capsule 1 mg       Objective:  Blood pressure 97/78, pulse 78, temperature 97.8  F (36.6  C), temperature source Oral, resp. rate 16, height 1.829 m (6'), weight 89.4 kg (197 lb 3.2 oz), SpO2 93%.  Patient not seen today as he was out of his room this morning and now is in cardiac procedures.       PROCEDURES:  Paracentesis 10/10 with 4.65 L of  straw colored fluid was drained. There were no immediate complications.    Paracentesis 10/6 with 3L  cloudy, turbid milky fluid aspirated into vacuum bottles. Fluid sent for infection, LDH, glucose and chylomicrons      LABS:  BMP  Recent Labs   Lab 10/11/23  0626 10/10/23  0551 10/09/23  1109 10/08/23  0608   * 134* 134* 134*   POTASSIUM 4.8 4.5 3.9 5.2   CHLORIDE 92* 94* 95* 96*   KELLY 9.0 9.2 8.7 8.8   CO2 24 22 23 24   BUN 50.8* 36.9* 36.1* 54.8*   CR 7.01* 5.31* 4.80* 6.91*   * 102* 124* 90  94     CBC  Recent Labs   Lab 10/11/23  0626 10/09/23  1109 10/08/23  0608 10/06/23  0533   WBC 10.1 7.9 6.0 6.6   RBC 2.78* 2.68* 2.89* 3.11*   HGB 8.6* 8.4* 9.0* 9.5*   HCT 27.3* 26.4* 28.2* 29.2*   MCV 98 99 98 94   MCH 30.9 31.3 31.1 30.5   MCHC 31.5 31.8 31.9 32.5   RDW 15.2* 14.9 14.6 14.6    152 106* 117*     INR  Recent Labs   Lab 10/05/23  0245   INR 1.55*     LFTs  Recent Labs   Lab 10/11/23  0626 10/10/23  0551 10/09/23  1109 10/08/23  0608   ALKPHOS 130* 152* 152* 136*   AST 10 14 13 11   ALT 8 9 7 7   BILITOTAL 0.8 0.9 0.7 0.6   PROTTOTAL 6.6 6.8 7.0 6.1*   ALBUMIN 3.5 3.5 3.5 3.0*      PANCNo lab results found in last 7  days.  CULTURES:   7-Day Micro Results       Collected Updated Procedure Result Status      10/10/2023 1711 10/10/2023 2030 C. difficile Toxin B PCR with reflex to C. difficile Antigen and Toxins A/B EIA [[id number]]    Stool from Per Rectum    Final result Component Value   C Difficile Toxin B by PCR Negative   A negative result does not exclude actual disease due to C. difficile and may be due to improper collection, handling and storage of the specimen or the number of organisms in the specimen is below the detection limit of the assay.            10/10/2023 1313 10/10/2023 1336 Cell count with differential fluid [[id number]]    Ascites Fluid from Paracentesis    In process Component Value   No component results            10/10/2023 1313 10/10/2023 1438 Albumin fluid [[id number]]    Ascites Fluid from Paracentesis    Final result Component Value Units   Albumin Fluid Source Abdomen    Albumin fluid 1.3 g/dL            10/10/2023 1313 10/10/2023 1438 Protein fluid [[id number]]    Ascites Fluid from Paracentesis    Final result Component Value Units   Protein Fluid Source Abdomen    Protein Total Fluid 3.0 g/dL            10/10/2023 1313 10/10/2023 1336 Ascites Fluid Aerobic Bacterial Culture Routine [[id number]]   Ascites Fluid from Peritoneum    In process Component Value   No component results               10/10/2023 1313 10/10/2023 1336 Cell Count Body Fluid [[id number]]   Ascites Fluid from Paracentesis    In process Component Value   No component results            10/10/2023 1313 10/10/2023 1336 Differential Body Fluid [[id number]]   Ascites Fluid from Paracentesis    In process Component Value   No component results            10/06/2023 2110 10/06/2023 2211 Albumin fluid [[id number]]    Ascites Fluid from Abdomen    Final result Component Value Units   Albumin Fluid Source Peritoneum    Albumin fluid 1.6 g/dL            10/06/2023 2110 10/06/2023 2211 Protein fluid [[id number]]     Ascites Fluid from Abdomen    Final result Component Value Units   Protein Fluid Source Peritoneum    Protein Total Fluid 3.0 g/dL            10/06/2023 2110 10/11/2023 0024 Cytology non gyn [[id number]]   Ascites Fluid from Abdomen    Final result Component Value   Final Diagnosis This result contains rich text formatting which cannot be displayed here.   Clinical Information This result contains rich text formatting which cannot be displayed here.   Gross Description This result contains rich text formatting which cannot be displayed here.   Microscopic Description This result contains rich text formatting which cannot be displayed here.   Performing Labs This result contains rich text formatting which cannot be displayed here.            10/06/2023 2050 10/10/2023 2326 Cell count with differential fluid [[id number]]    (Abnormal)   Peritoneal Fluid from Abdomen    Final result Component Value   No component results            10/06/2023 2050 10/11/2023 0721 Peritoneal Fluid Aerobic Bacterial Culture Routine with Gram Stain [[id number]]   Peritoneal Fluid from Abdomen    Final result Component Value   Culture No Growth   Gram Stain Result No organisms seen    2+ WBC seen               10/06/2023 2050 10/10/2023 2346 Anaerobic Bacterial Culture Routine [[id number]]   Ascites Fluid from Abdomen    Preliminary result Component Value   Culture No anaerobic organisms isolated after 4 days  [P]                10/06/2023 2050 10/10/2023 2331 Fungal or Yeast Culture Routine [[id number]]   Ascites Fluid from Peritoneum    Preliminary result Component Value   Culture No growth after 4 days  [P]                10/06/2023 2050 10/06/2023 2137 Lactate dehydrogenase fluid [[id number]]    Ascites Fluid from Abdomen    Final result Component Value Units   LD Fluid Source Peritoneum    Lactate dehydrogenase fluid 51 U/L            10/06/2023 2050 10/06/2023 2136 Glucose fluid [[id number]]    Ascites Fluid from  Abdomen    Final result Component Value Units   Glucose Fluid Source Peritoneum    Glucose fluid 174 mg/dL            10/06/2023 2050 10/06/2023 2301 Cell Count Body Fluid [[id number]]    (Abnormal)   Peritoneal Fluid from Abdomen    Final result Component Value Units   Color Pink    Clarity Turbid    Body Fluid Comment       Cell Count Fluid Source Peritoneum    Total Nucleated Cells 1,119 /uL            10/06/2023 2050 10/10/2023 2326 Differential Body Fluid [[id number]]    Peritoneal Fluid from Abdomen    Final result Component Value Units   % Neutrophils 1 %   % Lymphocytes 77 %   % Monocyte/Macrophages 6 %   % Lining Cells 4 %   % Other Cells 12 %   Absolute Neutrophils, Body Fluid 11.2 /uL            10/05/2023 0727 10/05/2023 0925 Symptomatic Influenza A/B, RSV, & SARS-CoV2 PCR (COVID-19) Nasopharyngeal [[id number]]    Swab from Nasopharyngeal    Final result Component Value   Influenza A PCR Negative   Influenza B PCR Negative   RSV PCR Negative   SARS CoV2 PCR Negative   NEGATIVE: SARS-CoV-2 (COVID-19) RNA not detected, presumed negative.            10/05/2023 0328 10/10/2023 0431 Blood Culture Peripheral Blood [[id number]]   Peripheral Blood    Final result Component Value   Culture No Growth               10/05/2023 0328 10/10/2023 0431 Blood Culture Peripheral Blood [[id number]]   Peripheral Blood    Final result Component Value   Culture No Growth                        IMAGING:  CT Chest PE, A/P on 10/5/23  IMPRESSION:  1.  Large pericardial effusion. This measures 25 mm in greatest depth which is suggestive of at least 500 mL of fluid.  2.  Pulmonary arteries are increased in size with the main pulmonary artery measuring 39 mm. This is consistent with pulmonary arterial hypertension. Segmental and subsegmental pulmonary artery emboli are seen to the left upper lobe. No definite right   heart strain.  3.  Small bilateral pleural effusions with the right effusion appearing chronically  loculated with pleural thickening. Bilateral lung consolidations right greater than left suspicious for multifocal pneumonia.  4.  Prior liver transplant. Multiple upper abdominal collaterals and splenomegaly suggestive of portal hypertension, with small to moderate abdominopelvic ascites.  5.  Prominent gas and fluid-filled loops of small and large bowel likely reactive ileus.     US LE on 10/5/23  IMPRESSION:   The right CFV and GSV are unable to be assessed due to the existing  arterial line. Exam is otherwise negative for deep vein thrombosis  bilaterally.     US RUE on 10/5/23  IMPRESSION:   1. Negative for right upper extremity DVT.  2. Short segment DVT in the right cephalic vein at the antecubital  fossa, which may be related to prior intravenous access.     US LUE on 10/5/23  IMPRESSION:  1. Negative for DVT in the left upper extremity.  2. Patent AV fistula with good blood flow volume of 1920 mL/min.  Moderate stenosis in the mid to upper outflow cephalic vein with  diameter reduced to 2.6 mm.     CXR on 10/5/23  IMPRESSION: Cardiac enlargement. Dual-lumen central line tip right atrium. Bibasilar atelectasis or infiltrate and small effusions.

## 2023-10-11 NOTE — PROGRESS NOTES
Potassium   Date Value Ref Range Status   10/11/2023 4.8 3.4 - 5.3 mmol/L Final   12/29/2022 3.4 3.4 - 5.3 mmol/L Final   04/20/2020 3.9 3.4 - 5.3 mmol/L Final     Potassium POCT   Date Value Ref Range Status   10/05/2023 3.6 3.4 - 5.3 mmol/L Final     Hemoglobin   Date Value Ref Range Status   10/11/2023 8.6 (L) 13.3 - 17.7 g/dL Final   04/20/2020 14.3 13.3 - 17.7 g/dL Final     Creatinine   Date Value Ref Range Status   10/11/2023 7.01 (H) 0.67 - 1.17 mg/dL Final   04/20/2020 1.06 0.66 - 1.25 mg/dL Final     Urea Nitrogen   Date Value Ref Range Status   10/11/2023 50.8 (H) 8.0 - 23.0 mg/dL Final   12/29/2022 46 (H) 7 - 30 mg/dL Final   04/20/2020 23 7 - 30 mg/dL Final     Sodium   Date Value Ref Range Status   10/11/2023 132 (L) 135 - 145 mmol/L Final     Comment:     Reference intervals for this test were updated on 09/26/2023 to more accurately reflect our healthy population. There may be differences in the flagging of prior results with similar values performed with this method. Interpretation of those prior results can be made in the context of the updated reference intervals.    04/20/2020 139 133 - 144 mmol/L Final     INR   Date Value Ref Range Status   10/05/2023 1.55 (H) 0.85 - 1.15 Final   10/07/2019 1.20 (H) 0.86 - 1.14 Final       DIALYSIS PROCEDURE NOTE  Hepatitis status of previous patient on machine log was checked and verified ok to use with this patients hepatitis status.  Patient dialyzed for 4 hrs. on a K2 bath with a net fluid removal of  2L.  A BFR of 400 ml/min was obtained via a CVC.      The treatment plan was discussed with Dr. Montaño during the treatment.    Total heparin received during the treatment: 0 units.     Line flushed, clamped and capped with heparin 1:1000 1.9 mL (1900  units) per lumen    Meds  given: midodrine   Complications: none      Person educated: pre-tx. Knowledge base substantial. Barriers to learning: none. Educated on procedure via verbal mode. Patient verbalized  understanding.     ICEBOAT? Timeout performed pre-treatment  I: Patient was identified using 2 identifiers  C:  Consent Signed Yes  E: Equipment preventative maintenance is current and dialysis delivery system OK to use  B:    Latest Reference Range & Units 09/27/23 17:50   Hep B Surface Agn Nonreactive  Nonreactive   Hepatitis B Surface Antibody Instrument Value <8.00 m[IU]/mL 0.12   Hepatitis B Surface Antibody  Nonreactive     O: Dialysis orders present and complete prior to treatment  A: Vascular access verified and assessed prior to treatment  T: Treatment was performed at a clinically appropriate time  ?: Patient was allowed to ask questions and address concerns prior to treatment  See Adult Hemodialysis flowsheet in [employer] for further details and post assessment.  Machine water alarm in place and functioning. Transducer pods intact and checked every 15min.   Patient sent to cath lab via bed post treatment..  Chlorine/Chloramine water system checked every 4 hours.  Outpatient Dialysis at [facility].    Patient repositioned every 2 hours during the treatment.  Post treatment report given to NIA Guardado RN regarding 2L of fluid removed, last BP of 102/73, and patient pain rating of 0/10.

## 2023-10-11 NOTE — PROGRESS NOTES
" Renal Medicine Progress Note            Assessment/Plan:     1.  ESKD. MWF HD. No UF on HD yesterday. Volume excess may be only in abd, pericardial spaces and not easily amenable to removal with HD.      Will plan to rest left AVF with infiltration 10//6 and continue CVC for now.     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     2.  Pericardial effusion, ascites. Getting heart cath today, pericardiocentesis.    3. Recent Sepsis syndrome.  Was in ICU on pressors. ON zosyn for possible SBP.  Improved hemodynamics on 15mg mg TID midodrine. addition dose 10mg mid run during HD.      3.  PE.  Pt on a heparin gtt.       4.  Hx liver transplant. S/p GI consult.            Plan/Recs:  1) Plan HD today via CVC, 2kg UF goal, additional midodrine given mid run to miminimize intradialytic hypotension.      Torsten Montaño DO  [facility] consultants  Office: [phone]  Cell: [phone]        Interval History:      Pt seen on dialysis, one hour into run, BP\"s  systolic.     Pt not feeling much different. Planned cardiac procedures today.     Weight today 89.4kg.         Medications and Allergies:      - MEDICATION INSTRUCTIONS for Dialysis Patients -   Does not apply See Admin Instructions    albumin human  25-50 g Intravenous Once    amiodarone  200 mg Oral BID    gabapentin  300 mg Oral At Bedtime    guaiFENesin  600 mg Oral BID    Lacosamide  100 mg Oral QPM    lacosamide  50 mg Oral QAM    melatonin  3 mg Oral At Bedtime    midodrine  10 mg Oral Once in dialysis/CRRT    midodrine  15 mg Oral TID w/meals    multivitamin RENAL  1 capsule Oral Daily    - MEDICATION INSTRUCTIONS -   Does not apply Once    pantoprazole  40 mg Oral QAM AC    piperacillin-tazobactam  2.25 g Intravenous Q6H    sevelamer carbonate  800 mg Oral 4x Daily    sodium chloride 0.9%  250 mL Intravenous Once in dialysis/CRRT    sodium chloride 0.9%  300 mL Hemodialysis Machine Once    tacrolimus  1 mg " Oral Q12H      No Known Allergies         Physical Exam:   Vitals were reviewed  BP 91/63   Pulse 82   Temp 97.7  F (36.5  C) (Oral)   Resp 16   Ht 1.829 m (6')   Wt 89.4 kg (197 lb 3.2 oz)   SpO2 98%   BMI 26.75 kg/m      Wt Readings from Last 3 Encounters:   10/11/23 89.4 kg (197 lb 3.2 oz)   10/03/23 86.2 kg (190 lb)   10/01/23 87.9 kg (193 lb 11.2 oz)       Intake/Output Summary (Last 24 hours) at 10/11/2023 0950  Last data filed at 10/11/2023 0600  Gross per 24 hour   Intake 810 ml   Output --   Net 810 ml     GENERAL APPEARANCE: alert, oriented  HEENT:  Eyes/ears/nose/neck grossly normal  RESP: lungs cta b c good efforts, no crackles, rhonchi or wheezes  CV: RRR, nl S1/S2, distant, no murmurs  ABDOMEN: distended  EXTREMITIES/SKIN: no c/c/rashes/lesions; 1+ b/l leg edema  NEURO:  non-focal           Data:     BMP  Recent Labs   Lab 10/11/23  0626 10/10/23  0551 10/09/23  1109 10/08/23  0608   * 134* 134* 134*   POTASSIUM 4.8 4.5 3.9 5.2   CHLORIDE 92* 94* 95* 96*   KELLY 9.0 9.2 8.7 8.8   CO2 24 22 23 24   BUN 50.8* 36.9* 36.1* 54.8*   CR 7.01* 5.31* 4.80* 6.91*   * 102* 124* 90  94     CBC  Recent Labs   Lab 10/11/23  0626 10/09/23  1109 10/08/23  0608 10/06/23  0533   WBC 10.1 7.9 6.0 6.6   HGB 8.6* 8.4* 9.0* 9.5*   HCT 27.3* 26.4* 28.2* 29.2*   MCV 98 99 98 94    152 106* 117*     Lab Results   Component Value Date    AST 10 10/11/2023    ALT 8 10/11/2023    ALKPHOS 130 (H) 10/11/2023    BILITOTAL 0.8 10/11/2023    CHANDLER 14 (L) 10/05/2023     Lab Results   Component Value Date    INR 1.55 (H) 10/05/2023       Attestation:  I have reviewed today's vital signs, notes, medications, labs and imaging.    DO [facility] - Nephrology  Office: [phone]  Cell: [phone]

## 2023-10-11 NOTE — PROGRESS NOTES
[facility]    Medicine Progress Note - Hospitalist Service    Date of Admission:  10/5/2023    Assessment & Plan     Blanco Osborne is a 58 year old male with extensive medical history that includes liver transplant in 2016 due to alcoholic liver disease, now with progressive cirrhosis and ascites, paroxysmal atrial fibrillation, on anticoagulation with Eliquis, diabetes mellitus type 2, previous CVA, hypertension, CHRISTIAN on BiPAP, chronic pericardial effusion, ESRD on hemodialysis who presented on 10/5/2023 with palpitations and chest discomfort.       He was hypotensive and had wide-complex tachycardia felt to be atrial fibrillation with aberrancy.  He failed 2 attempts of emergent cardioversion.  He was started on amiodarone and subsequently converted to sinus rhythm. He is now on po amiodarone     He received IV fluids but remained hypotensive and subsequently received pressors that were discontinued on 10/7. He had severe sepsis likely due to SBP and pneumonia. Blood and ascitic fluid cultures were negative. He was intially on vancomycin and zosyn. Vancomycin was discontinued on 10/8.     Troponins were elevated, felt to be demand ischemia.  Echo showed pericardial effusion without tamponade.  CT C/A/P showed PE and pulmonary infiltrates.  He was started on IV heparin.      Acute hypoxic respiratory failure  - multifactorial, Requiring supplemental oxygen, currently 2 L/min  - continue supplemental oxygen, wean as able     Severe sepsis with septic shock - due to multifactorial pneumonia -resolved  Chronic hypotension, on midodrine  -Is normally on midodrine for chronic hypotension.    -Initially presented with septic shock which has now resolved.  Off pressors since 10/7/2023.  Blood pressure currently in normal range  -Continue midodrine for chronic hypotension     End-stage liver disease, s/p liver transplant in 2016, now with recurrent ascites and Portal hypertension  s/p  paracentesis on 10/6 and 10/11  Chylous effusion  -On presentation patient was found to have effusion and paracentesis was done and the there were 1119 nucleated cells and triglycerides were elevated at more than 400 and it was chylous effusion and patient has been treated with IV Zosyn for presumed SBP  - his differential count shows predominantly lymphocytes and ANC is only 11 and he does not have SBP and his cultures have been negative including aerobic, anaerobic and fungal  -Patient continues to have recurrent ascites and underwent another paracentesis on 10/11 and about 4.6 L of fluid was removed and cell count and culture data on it is pending  -Patient is followed by GI from the [place] team and reviewed their note and given the onset of fasciitis and cardiopulmonary symptoms it raises the possibility that his ascites may be due to right sided heart pressure and they have recommended a right heart cath and have spoken to cardiology team and pulmonary pressures will also be assessed.  If his pressures are not elevated then future test may include transjugular liver biopsy and or hepatic vein wedge pressure measurement and that decision will be up to the transplant team of Dr. Simms and they are in touch with him  - I again discussed with GI team today       Multifocal pneumonia, organism unspecified  - CT scan showed pulmonary infiltrates   - COVID 19/influenza/RSV negative  - blood cultures negative till date from 10/5  - unable to provide sputum specimen  -vancomycin was discontinued on 10/8  - continue IV zosyn for now and will plan To switch to oral Augmentin from tomorrow morning to complete 10-day course     Pulmonary embolism in segmental and subsegmental pulmonary arteries of left upper lobe, without cor pulmonale -  Short segment  thrombophlebitis in right cephalic vein at antecubital fossa, likely related to prior intravenous access  -Eliquis held on admission and was started on IV heparin  -  Anticoagulation with apixaban was interrupted for at least 2 days during previous hospitalization for pericardiocentesis.  Will transition back to apixaban when no more procedures required   - No DVT in extremities  -Continue with heparin drip for now     Recurrent chronic pericardial effusion, s/p pericardiocentesis on 9/29.  Drain was removed on 9/30.   - Felt to be due to volume overload due to renal failure   - Repeat TTE 10/7 showed moderate pericardial effusion, slightly larger than seen on 10/5, with no evidence of tamponade physiology  -Repeat echo on 10/9 showed moderate to large bloody cardial effusion and as compared to 10/7 it has increased, IVC is mildly dilated, mild RV diastolic collapse and has inflow velocity variation consistent with hemodynamic compromise  -Cardiology has been following the patient andPlan is for pericardial drain placement along with right and left heart cath and this was communicated to the patient and family yesterday and today      Recurrent chest discomfort  -He has been having on and off chest discomfort and initially during the course of hospitalization his troponin did peak at 346 on 10/5 and it was thought to be due to demand because of shock and EKG did not had any ST elevation and patient had rapid response yesterday and repeat troponin yesterday showed troponins have trended down to 307  -Echo as above  -Plan for right and left heart cath today       Chronic paroxysmal atrial fibrillation, on anticoagulation with Eliquis  Wide-complex tachycardia on admission felt to be atrial fibrillation with aberrant conduction, failed cardioversion x 2  -Patient was started on amiodarone and converted to normal sinus rhythm and his Eliquis was held and he was started on heparin drip  -Cardiology has been following the patient and we will continue with the heparin drip today and plan is to transition to oral Eliquis when no more procedures are required     ESRD, on hemodialysis q.  Monday, Wednesday, Friday  S/p left AV fistula pseudoaneurysm repair, 8/2023  His AV fistula on the left upper extremity infiltrated on 10/6-  - continue dialysis per nephrology and continue with renal multivitamin and sevelamer and discussed with Dr. Montaño from nephrology       Small bilateral pleural effusions  Chronically loculated right pleural effusion  - stable         Generalized anxiety disorder  Restless leg syndrome  -On hydroxyzine and ropinirole        Chronic anemia-due to anemia of chronic disease  -Hemoglobin is baseline between 8-9  -Hemoglobin is stable today at 8.6 on 10/1  -We will check labs intermittently     chronic thrombocytopenia-Resolved  -Secondary to cirrhosis  -Platelet count this morning is stable at 183 on 10/11    Seizure disorder  - continue Vimpat     GERD  -Continue PPI     Probable CHRISTIAN  --Per patient's wife, he had a sleep study about 10 years ago but does not have any CPAP or BiPAP at home  -Did use BiPAP last night  -Need to  undergo sleep study as outpatient after discharge     Previous embolic strokes     Generalized weakness  -Continue with physical therapy and Occupational Therapy.     Pain at multiple locations  -Has pain in left upper extremity due to infiltration of AV fistula, pain in right upper extremity due to thrombophlebitis and frequent lab draws.  Also complains of diffuse pain in shoulders and upper back  -continue Tylenol as needed  -IV Dilaudid 0.2 mg every 6 hours as needed and Percocet every 8 hours as needed             Diet: Snacks/Supplements Adult: Gelatein Plus; Between Meals  NPO per Anesthesia Guidelines for Procedure/Surgery Except for: Meds  NPO for Medical/Clinical Reasons Except for: Meds    DVT Prophylaxis: Heparin drip  Nixon Catheter: Not present  Lines: PRESENT      Hemodialysis Vascular Access Right Subclavian-Site Assessment: WDL  Hemodialysis Vascular Access Arteriovenous fistula Left Forearm-Site Assessment: Bruit present;Thrill present       Cardiac Monitoring: ACTIVE order. Indication: Tachyarrhythmias, acute (48 hours)  Code Status: Full Code      Clinically Significant Risk Factors              # Hypoalbuminemia: Lowest albumin = 3 g/dL at 10/8/2023  6:08 AM, will monitor as appropriate         # Hypertension: Noted on problem list        # Overweight: Estimated body mass index is 26.75 kg/m  as calculated from the following:    Height as of this encounter: 1.829 m (6').    Weight as of this encounter: 89.4 kg (197 lb 3.2 oz).               Disposition Plan      Expected Discharge Date: 10/12/2023      Destination: home with family;inpatient rehabilitation facility  Discharge Comments: 10-11 angio and pericardiocentesis and HD          Deloris Hamilton MD  Hospitalist Service  [facility]  Securely message with [employer] (more info)  Text page via [employer] Paging/Directory   _    Patient is critically ill and his prognosis is guarded  _____________________________________________________________________    Interval History     Seen today and patient was in the hemodialysis lab.  He mentioned that his belly is again getting full.  I did tell the patient about plan for right and left heart cath along with pericardiocentesis drain placement but apparently patient said he was not told about it.  It was clearly told to the patient yesterday in front of his wife, nephrology, and his brother.    I explained at length about the procedure to the patient when apparently his understanding was that after paracentesis it will be determined further but after my explanation he understands and agrees.  He denies any nausea or vomiting but was hungry but understands that he needs to be n.p.o. until his procedures are done.    No family was present and he told me that his wife is working till 3 PM today    Spoke with wife ursula at 340 pm and discussed his case         Physical Exam   Vital Signs: Temp: 97.7  F (36.5  C) Temp src: Oral BP: 97/68 Pulse:  85   Resp: 16 SpO2: 100 % O2 Device: Nasal cannula Oxygen Delivery: 2 LPM  Weight: 197 lbs 3.2 oz        General: Patient appears comfortable and in no acute distress.  Respiratory: Lungs are clear to auscultation bilaterally with no wheeze or crackles and has right-sided permacath  Cardiovascular: Regular rate , S1 and S2 normal with no murmer or rubs or gallops  Abdomen:   soft , non tender , mild distended distended and bowel sound present   Skin: No skin rashes   Neurologic:  No facial droop  Musculoskeletal: Normal Range of motion over upper and lower extremities bilaterally   Psychiatric: cooperative          Medical Decision Making       Time spent in care of patient is 56 minutes and I discussed plan of care with the patient, nurse , nephrology and GI and reviewed notes from cards and reviewed his labs      Data     I have personally reviewed the following data over the past 24 hrs:    10.1  \   8.6 (L)   / 183     132 (L) 92 (L) 50.8 (H) /  136 (H)   4.8 24 7.01 (H) \     ALT: 8 AST: 10 AP: 130 (H) TBILI: 0.8   ALB: 3.5 TOT PROTEIN: 6.6 LIPASE: N/A     Trop: 307 (HH) BNP: N/A     INR:  N/A PTT:  60 (H)   D-dimer:  N/A Fibrinogen:  N/A       Imaging results reviewed over the past 24 hrs:   Recent Results (from the past 24 hour(s))   US Paracentesis with Albumin    Narrative    ULTRASOUND PARACENTESIS WITH ALBUMIN 10/10/2023 1:35 PM     HISTORY: Status post liver transplant has portal hypertension. Had tap  in ICU.    FINDINGS: Ultrasound was used to evaluate for the presence and best  approach for paracentesis. Written and oral informed consent was  obtained. A pause for the cause procedure to verify the correct  patient and correct procedure. The skin overlying the left lower  quadrant was prepped and draped in the usual sterile fashion. The  subcutaneous tissues were anesthetized with 10 mL 1% lidocaine. A  catheter was advanced into the peritoneal space and 4.65 L of  straw  colored fluid was drained.  There were no immediate complications.  Ultrasound images were permanently stored.  Patient left the  ultrasound suite in satisfactory condition.      Impression    IMPRESSION: Technically successful paracentesis without immediate  complications.    HANS WESTON MD         SYSTEM ID:  [id number]

## 2023-10-11 NOTE — TELEPHONE ENCOUNTER
----- Message from Jean-Paul Wright sent at 9/28/2023  3:06 PM CDT -----  Regarding: FW: Hard stop response    ----- Message -----  From: Elodia Stroud RN  Sent: 9/28/2023   2:15 PM CDT  To: Endoscopy Scheduling Pool  Subject: Hard stop response                               Please see message below    Elodia Stroud RN on 9/28/2023 at 2:15 PM    ----- Message -----  From: Stephen Vasquez MD  Sent: 9/28/2023   2:14 PM CDT  To: Elodia Stroud RN  Subject: RE: Review - Cardiac                             Sounds like they are going to drain it during this hospitalization so he should be good to go  ----- Message -----  From: Elodia Stroud RN  Sent: 9/28/2023   1:46 PM CDT  To: Stephen Vasquez MD  Subject: Review - Cardiac                                 Hi there,    Patient is scheduled for a Colonoscopy   Date of procedure: TBD  Indication: screening  Sedation type: MAC    Reason for review: Hi there pt is trying to schedule his colonoscopy.    He is currently admitted to the hospital for pericardial effusion. Report from yesterday's Echo reads:    Interpretation Summary     Moderate, apical posterior pericardial effusion without signs of ventricular  interdependence/tamponade.  Normal inferior vena cava.  Normal right ventricular size and systolic function.  Normal left ventricular size and systolic function.  The visual ejection fraction is 60-65%.  No regional wall motion abnormalities noted.  Mild aortic valve stenosis.  Trace mitral and tricuspid valve regurgitation.     Compared to the recent study dated 8/29/2023, the pericardial effusion appears  larger.  I personally visited with the patient. He is asymptomatic, had dialysis  earlier today, hemodynamically and rhythmically stable.  However, given the temporal progression the size of the pericardial effusion,  elective direct hospital admission for pericardiocentesis recommended.  Please see EMR for details.       Is this pt ok to proceed  to schedule at the [facility] or does he need follow up prior to having his procedure?      Thank you,   Elodia Stroud RN  Endoscopy Procedure Pre Assessment RN  [phone] option 4

## 2023-10-12 ENCOUNTER — APPOINTMENT (OUTPATIENT)
Dept: ULTRASOUND IMAGING | Facility: CLINIC | Age: 58
DRG: 871 | End: 2023-10-12
Attending: PHYSICIAN ASSISTANT
Payer: COMMERCIAL

## 2023-10-12 ENCOUNTER — APPOINTMENT (OUTPATIENT)
Dept: PHYSICAL THERAPY | Facility: CLINIC | Age: 58
DRG: 871 | End: 2023-10-12
Payer: COMMERCIAL

## 2023-10-12 ENCOUNTER — APPOINTMENT (OUTPATIENT)
Dept: CARDIOLOGY | Facility: CLINIC | Age: 58
DRG: 871 | End: 2023-10-12
Attending: INTERNAL MEDICINE
Payer: COMMERCIAL

## 2023-10-12 LAB
% LINING CELLS, BODY FLUID: 1 %
ABSOLUTE NEUTROPHILS, BODY FLUID: 26.6 /UL
ALBUMIN BODY FLUID SOURCE: NORMAL
ALBUMIN FLD-MCNC: 1 G/DL
ALBUMIN SERPL BCG-MCNC: 3.2 G/DL (ref 3.5–5.2)
ALP SERPL-CCNC: 129 U/L (ref 40–129)
ALT SERPL W P-5'-P-CCNC: 8 U/L (ref 0–70)
ANION GAP SERPL CALCULATED.3IONS-SCNC: 13 MMOL/L (ref 7–15)
APPEARANCE FLD: ABNORMAL
APTT PPP: 67 SECONDS (ref 22–38)
AST SERPL W P-5'-P-CCNC: 12 U/L (ref 0–45)
BILIRUB SERPL-MCNC: 0.8 MG/DL
BUN SERPL-MCNC: 36.1 MG/DL (ref 8–23)
CALCIUM SERPL-MCNC: 8.9 MG/DL (ref 8.6–10)
CELL COUNT BODY FLUID SOURCE: ABNORMAL
CHLORIDE SERPL-SCNC: 94 MMOL/L (ref 98–107)
COLOR FLD: ABNORMAL
CREAT SERPL-MCNC: 4.89 MG/DL (ref 0.67–1.17)
DEPRECATED HCO3 PLAS-SCNC: 27 MMOL/L (ref 22–29)
EGFRCR SERPLBLD CKD-EPI 2021: 13 ML/MIN/1.73M2
EOSINOPHIL NFR FLD MANUAL: 1 %
ERYTHROCYTE [DISTWIDTH] IN BLOOD BY AUTOMATED COUNT: 15.4 % (ref 10–15)
GLUCOSE BLDC GLUCOMTR-MCNC: 142 MG/DL (ref 70–99)
GLUCOSE SERPL-MCNC: 117 MG/DL (ref 70–99)
HCT VFR BLD AUTO: 24.8 % (ref 40–53)
HGB BLD-MCNC: 8 G/DL (ref 13.3–17.7)
LVEF ECHO: NORMAL
LYMPHOCYTES NFR FLD MANUAL: 73 %
MCH RBC QN AUTO: 32 PG (ref 26.5–33)
MCHC RBC AUTO-ENTMCNC: 32.3 G/DL (ref 31.5–36.5)
MCV RBC AUTO: 99 FL (ref 78–100)
MONOS+MACROS NFR FLD MANUAL: 22 %
NEUTS BAND NFR FLD MANUAL: 4 %
OTHER CELLS FLD MANUAL: 1 %
PLATELET # BLD AUTO: 128 10E3/UL (ref 150–450)
POTASSIUM SERPL-SCNC: 4.5 MMOL/L (ref 3.4–5.3)
PROT FLD-MCNC: 2.4 G/DL
PROT SERPL-MCNC: 6.3 G/DL (ref 6.4–8.3)
PROTEIN BODY FLUID SOURCE: NORMAL
RBC # BLD AUTO: 2.5 10E6/UL (ref 4.4–5.9)
SODIUM SERPL-SCNC: 134 MMOL/L (ref 135–145)
UFH PPP CHRO-ACNC: <0.1 IU/ML
WBC # BLD AUTO: 6.4 10E3/UL (ref 4–11)
WBC # FLD AUTO: 761 /UL

## 2023-10-12 PROCEDURE — 88184 FLOWCYTOMETRY/ TC 1 MARKER: CPT | Performed by: PHYSICIAN ASSISTANT

## 2023-10-12 PROCEDURE — 99233 SBSQ HOSP IP/OBS HIGH 50: CPT | Performed by: INTERNAL MEDICINE

## 2023-10-12 PROCEDURE — 36415 COLL VENOUS BLD VENIPUNCTURE: CPT | Performed by: HOSPITALIST

## 2023-10-12 PROCEDURE — 82247 BILIRUBIN TOTAL: CPT | Performed by: DIETITIAN, REGISTERED

## 2023-10-12 PROCEDURE — 85027 COMPLETE CBC AUTOMATED: CPT | Performed by: HOSPITALIST

## 2023-10-12 PROCEDURE — 84157 ASSAY OF PROTEIN OTHER: CPT | Performed by: PHYSICIAN ASSISTANT

## 2023-10-12 PROCEDURE — 88305 TISSUE EXAM BY PATHOLOGIST: CPT | Mod: TC | Performed by: PHYSICIAN ASSISTANT

## 2023-10-12 PROCEDURE — 250N000012 HC RX MED GY IP 250 OP 636 PS 637: Performed by: INTERNAL MEDICINE

## 2023-10-12 PROCEDURE — 93308 TTE F-UP OR LMTD: CPT | Mod: 26 | Performed by: INTERNAL MEDICINE

## 2023-10-12 PROCEDURE — 82042 OTHER SOURCE ALBUMIN QUAN EA: CPT | Performed by: PHYSICIAN ASSISTANT

## 2023-10-12 PROCEDURE — 87070 CULTURE OTHR SPECIMN AEROBIC: CPT | Performed by: PHYSICIAN ASSISTANT

## 2023-10-12 PROCEDURE — 250N000011 HC RX IP 250 OP 636: Mod: JZ | Performed by: HOSPITALIST

## 2023-10-12 PROCEDURE — 250N000009 HC RX 250: Performed by: STUDENT IN AN ORGANIZED HEALTH CARE EDUCATION/TRAINING PROGRAM

## 2023-10-12 PROCEDURE — 80053 COMPREHEN METABOLIC PANEL: CPT | Performed by: HOSPITALIST

## 2023-10-12 PROCEDURE — 88185 FLOWCYTOMETRY/TC ADD-ON: CPT | Performed by: PHYSICIAN ASSISTANT

## 2023-10-12 PROCEDURE — 99232 SBSQ HOSP IP/OBS MODERATE 35: CPT | Mod: FS | Performed by: NURSE PRACTITIONER

## 2023-10-12 PROCEDURE — 97530 THERAPEUTIC ACTIVITIES: CPT | Mod: GP

## 2023-10-12 PROCEDURE — 120N000001 HC R&B MED SURG/OB

## 2023-10-12 PROCEDURE — 93325 DOPPLER ECHO COLOR FLOW MAPG: CPT | Mod: 26 | Performed by: INTERNAL MEDICINE

## 2023-10-12 PROCEDURE — 89051 BODY FLUID CELL COUNT: CPT | Performed by: PHYSICIAN ASSISTANT

## 2023-10-12 PROCEDURE — 84478 ASSAY OF TRIGLYCERIDES: CPT | Performed by: DIETITIAN, REGISTERED

## 2023-10-12 PROCEDURE — 99232 SBSQ HOSP IP/OBS MODERATE 35: CPT | Performed by: PHYSICIAN ASSISTANT

## 2023-10-12 PROCEDURE — 88189 FLOWCYTOMETRY/READ 16 & >: CPT | Mod: GC | Performed by: STUDENT IN AN ORGANIZED HEALTH CARE EDUCATION/TRAINING PROGRAM

## 2023-10-12 PROCEDURE — 272N000706 US PARACENTESIS WITH ALBUMIN

## 2023-10-12 PROCEDURE — 250N000013 HC RX MED GY IP 250 OP 250 PS 637: Performed by: HOSPITALIST

## 2023-10-12 PROCEDURE — 97116 GAIT TRAINING THERAPY: CPT | Mod: GP

## 2023-10-12 PROCEDURE — 0W9G3ZZ DRAINAGE OF PERITONEAL CAVITY, PERCUTANEOUS APPROACH: ICD-10-PCS | Performed by: STUDENT IN AN ORGANIZED HEALTH CARE EDUCATION/TRAINING PROGRAM

## 2023-10-12 PROCEDURE — 93308 TTE F-UP OR LMTD: CPT

## 2023-10-12 PROCEDURE — 99233 SBSQ HOSP IP/OBS HIGH 50: CPT | Performed by: HOSPITALIST

## 2023-10-12 PROCEDURE — 93325 DOPPLER ECHO COLOR FLOW MAPG: CPT

## 2023-10-12 PROCEDURE — 85730 THROMBOPLASTIN TIME PARTIAL: CPT | Performed by: HOSPITALIST

## 2023-10-12 PROCEDURE — 93321 DOPPLER ECHO F-UP/LMTD STD: CPT | Mod: 26 | Performed by: INTERNAL MEDICINE

## 2023-10-12 PROCEDURE — 83690 ASSAY OF LIPASE: CPT | Performed by: DIETITIAN, REGISTERED

## 2023-10-12 PROCEDURE — 85520 HEPARIN ASSAY: CPT | Performed by: HOSPITALIST

## 2023-10-12 RX ORDER — LIDOCAINE HYDROCHLORIDE 10 MG/ML
10 INJECTION, SOLUTION EPIDURAL; INFILTRATION; INTRACAUDAL; PERINEURAL ONCE
Status: COMPLETED | OUTPATIENT
Start: 2023-10-12 | End: 2023-10-12

## 2023-10-12 RX ORDER — MIDODRINE HYDROCHLORIDE 10 MG/1
10 TABLET ORAL 3 TIMES DAILY
COMMUNITY
End: 2023-11-22

## 2023-10-12 RX ORDER — MIDODRINE HYDROCHLORIDE 10 MG/1
10-20 TABLET ORAL 4 TIMES DAILY PRN
COMMUNITY
End: 2023-12-28

## 2023-10-12 RX ORDER — AMOXICILLIN AND CLAVULANATE POTASSIUM 500; 125 MG/1; MG/1
1 TABLET, FILM COATED ORAL
Status: DISCONTINUED | OUTPATIENT
Start: 2023-10-12 | End: 2023-10-13

## 2023-10-12 RX ADMIN — TACROLIMUS 1 MG: 1 CAPSULE ORAL at 09:20

## 2023-10-12 RX ADMIN — LIDOCAINE HYDROCHLORIDE 10 ML: 10 INJECTION, SOLUTION EPIDURAL; INFILTRATION; INTRACAUDAL; PERINEURAL at 11:06

## 2023-10-12 RX ADMIN — MIDODRINE HYDROCHLORIDE 15 MG: 5 TABLET ORAL at 11:43

## 2023-10-12 RX ADMIN — SEVELAMER CARBONATE 800 MG: 800 TABLET, FILM COATED ORAL at 11:43

## 2023-10-12 RX ADMIN — MELATONIN TAB 3 MG 3 MG: 3 TAB at 22:09

## 2023-10-12 RX ADMIN — AMIODARONE HYDROCHLORIDE 200 MG: 200 TABLET ORAL at 09:20

## 2023-10-12 RX ADMIN — HYDROMORPHONE HYDROCHLORIDE 0.2 MG: 0.2 INJECTION, SOLUTION INTRAMUSCULAR; INTRAVENOUS; SUBCUTANEOUS at 10:42

## 2023-10-12 RX ADMIN — LACOSAMIDE 100 MG: 50 TABLET, FILM COATED ORAL at 20:53

## 2023-10-12 RX ADMIN — Medication 1 CAPSULE: at 09:20

## 2023-10-12 RX ADMIN — HYDROMORPHONE HYDROCHLORIDE 0.2 MG: 0.2 INJECTION, SOLUTION INTRAMUSCULAR; INTRAVENOUS; SUBCUTANEOUS at 21:15

## 2023-10-12 RX ADMIN — SEVELAMER CARBONATE 800 MG: 800 TABLET, FILM COATED ORAL at 09:20

## 2023-10-12 RX ADMIN — OXYCODONE HYDROCHLORIDE AND ACETAMINOPHEN 1 TABLET: 7.5; 325 TABLET ORAL at 19:57

## 2023-10-12 RX ADMIN — HEPARIN SODIUM 1200 UNITS/HR: 10000 INJECTION, SOLUTION INTRAVENOUS at 22:48

## 2023-10-12 RX ADMIN — LACOSAMIDE 50 MG: 50 TABLET, FILM COATED ORAL at 09:24

## 2023-10-12 RX ADMIN — GABAPENTIN 300 MG: 300 CAPSULE ORAL at 22:09

## 2023-10-12 RX ADMIN — SEVELAMER CARBONATE 800 MG: 800 TABLET, FILM COATED ORAL at 16:24

## 2023-10-12 RX ADMIN — AMOXICILLIN AND CLAVULANATE POTASSIUM 1 TABLET: 500; 125 TABLET, FILM COATED ORAL at 16:24

## 2023-10-12 RX ADMIN — PANTOPRAZOLE SODIUM 40 MG: 40 TABLET, DELAYED RELEASE ORAL at 09:20

## 2023-10-12 RX ADMIN — AMIODARONE HYDROCHLORIDE 200 MG: 200 TABLET ORAL at 22:09

## 2023-10-12 RX ADMIN — OXYCODONE HYDROCHLORIDE AND ACETAMINOPHEN 1 TABLET: 7.5; 325 TABLET ORAL at 09:24

## 2023-10-12 RX ADMIN — HYDROMORPHONE HYDROCHLORIDE 0.2 MG: 0.2 INJECTION, SOLUTION INTRAMUSCULAR; INTRAVENOUS; SUBCUTANEOUS at 05:18

## 2023-10-12 RX ADMIN — GUAIFENESIN 600 MG: 600 TABLET, EXTENDED RELEASE ORAL at 21:14

## 2023-10-12 RX ADMIN — MIDODRINE HYDROCHLORIDE 15 MG: 5 TABLET ORAL at 09:21

## 2023-10-12 RX ADMIN — OXYCODONE HYDROCHLORIDE AND ACETAMINOPHEN 1 TABLET: 7.5; 325 TABLET ORAL at 02:08

## 2023-10-12 RX ADMIN — GUAIFENESIN 600 MG: 600 TABLET, EXTENDED RELEASE ORAL at 09:21

## 2023-10-12 RX ADMIN — MIDODRINE HYDROCHLORIDE 15 MG: 5 TABLET ORAL at 16:24

## 2023-10-12 RX ADMIN — SEVELAMER CARBONATE 800 MG: 800 TABLET, FILM COATED ORAL at 20:53

## 2023-10-12 RX ADMIN — PIPERACILLIN AND TAZOBACTAM 2.25 G: 2; .25 INJECTION, POWDER, FOR SOLUTION INTRAVENOUS at 05:17

## 2023-10-12 ASSESSMENT — ACTIVITIES OF DAILY LIVING (ADL)
FALL_HISTORY_WITHIN_LAST_SIX_MONTHS: YES
TOILETING_ISSUES: NO
DIFFICULTY_EATING/SWALLOWING: NO
ADLS_ACUITY_SCORE: 24
ADLS_ACUITY_SCORE: 24
DRESSING/BATHING_DIFFICULTY: NO
ADLS_ACUITY_SCORE: 24
EQUIPMENT_CURRENTLY_USED_AT_HOME: CANE, QUAD
CONCENTRATING,_REMEMBERING_OR_MAKING_DECISIONS_DIFFICULTY: NO
CHANGE_IN_FUNCTIONAL_STATUS_SINCE_ONSET_OF_CURRENT_ILLNESS/INJURY: NO
WEAR_GLASSES_OR_BLIND: YES
ADLS_ACUITY_SCORE: 35
DOING_ERRANDS_INDEPENDENTLY_DIFFICULTY: NO
ADLS_ACUITY_SCORE: 24
NUMBER_OF_TIMES_PATIENT_HAS_FALLEN_WITHIN_LAST_SIX_MONTHS: 2
ADLS_ACUITY_SCORE: 35
WALKING_OR_CLIMBING_STAIRS_DIFFICULTY: NO
ADLS_ACUITY_SCORE: 24
ADLS_ACUITY_SCORE: 24
VISION_MANAGEMENT: GLASSES
ADLS_ACUITY_SCORE: 24

## 2023-10-12 NOTE — PROGRESS NOTES
Transfer report given to PETER Ornelas. All belongings packed and sent with patient to room 820. RN called pts wife Ofe and left voicemail to inform of transfer.

## 2023-10-12 NOTE — PLAN OF CARE
A&Ox4, forgetful. Has repeated requests and questions. BP 96/63 (BP Location: Right arm)   Pulse 82   Temp 97.6  F (36.4  C) (Oral)   Resp 16   Ht 1.829 m (6')   Wt 87.7 kg (193 lb 4.8 oz)   SpO2 93%   BMI 26.22 kg/m   BP drops 50-80's when standing but in the 90's when sitting. Dizzy when standing. Tele SR. PRN Dilaudid IVP and Percocet given for generalized and pericardial drain pain. Up with A 1-2 GB. L jugular and L groin sites intact. R groin site ecchymotic. Clement cath to R chest wall. Pericardial drain in place. No output overnight. ABX given.

## 2023-10-12 NOTE — PLAN OF CARE
BP low, scheduled midodrine effective. Pericardial drain removed 1030, site CDI. Paracentesis again today for 2.3 L. Heparin infusing at 1200 units per hour. Pain treated with PRN percocet and IV dilaudid. Pt feels strongly about walking in halls frequently-tolerates well after midodrine dose. Renal, GI, following. Cards signed off. Family involved and supportive. Pt is very forgetful and occasionally confused. With staff patient is quite cooperative and thankful for cares.

## 2023-10-12 NOTE — PROGRESS NOTES
Renal Medicine Progress Note            Assessment/Plan:     1.  ESKD. MWF HD.      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  Pericardial effusion, ascites. S/p pericardiocentesis, drain in place but no output.      3. Recent Sepsis syndrome.  Was in ICU on pressors. ON zosyn for possible SBP.  Improved hemodynamics on 15mg mg TID midodrine. addition dose 10mg mid run during HD.      3.  PE.       4.  Hx liver transplant. S/p GI consult.         Plan/Recs:  1) Plan HD tomorrow via CVC, 2kg UF goal which he has been tolerating. Weights lower, will need new target weight established for outpatient dialysis unit    Torsten Montaño DO  [facility]  Office: [phone]  Cell: [phone]        Interval History:     Pt s/p right heart cath and pericardial drain placed (after 560cc fluid removed). Filling pressures in normal range. He is feeling well, wanting drain removed so he can ambulate/exercise. NO dyspnea today.     Weght today 87.7kg.Systolic BP's consistently in the  range.           Medications and Allergies:      - MEDICATION INSTRUCTIONS for Dialysis Patients -   Does not apply See Admin Instructions    albumin human  25-50 g Intravenous Once    amiodarone  200 mg Oral BID    amoxicillin-clavulanate  1 tablet Oral Q24H BIJU    gabapentin  300 mg Oral At Bedtime    guaiFENesin  600 mg Oral BID    Lacosamide  100 mg Oral QPM    lacosamide  50 mg Oral QAM    melatonin  3 mg Oral At Bedtime    midodrine  15 mg Oral TID w/meals    multivitamin RENAL  1 capsule Oral Daily    pantoprazole  40 mg Oral QAM AC    sevelamer carbonate  800 mg Oral 4x Daily    tacrolimus  1 mg Oral Q12H      No Known Allergies         Physical Exam:   Vitals were reviewed  BP 96/54 (BP Location: Right arm, Patient Position: Semi-Salgado's, Cuff Size: Adult Regular)   Pulse 78   Temp 98.1  F (36.7  C)  (Oral)   Resp 18   Ht 1.829 m (6')   Wt 87.7 kg (193 lb 4.8 oz)   SpO2 96%   BMI 26.22 kg/m      Wt Readings from Last 3 Encounters:   10/12/23 87.7 kg (193 lb 4.8 oz)   10/03/23 86.2 kg (190 lb)   10/01/23 87.9 kg (193 lb 11.2 oz)       Intake/Output Summary (Last 24 hours) at 10/12/2023 0941  Last data filed at 10/12/2023 0021  Gross per 24 hour   Intake 480 ml   Output 2660 ml   Net -2180 ml       GENERAL APPEARANCE: alert, oriented  HEENT:  Eyes/ears/nose/neck grossly normal  RESP: lungs cta b c good efforts, no crackles, rhonchi or wheezes  CV: RRR, nl S1/S2, distant, no murmurs, drain present  ABDOMEN: distended  EXTREMITIES/SKIN: no c/c/rashes/lesions; no b/l leg edema  NEURO:  non-focal           Data:     BMP  Recent Labs   Lab 10/12/23  0644 10/12/23  0204 10/11/23  2114 10/11/23  1746 10/11/23  0626 10/10/23  0551 10/09/23  1109   *  --   --   --  132* 134* 134*   POTASSIUM 4.5  --   --   --  4.8 4.5 3.9   CHLORIDE 94*  --   --   --  92* 94* 95*   KELLY 8.9  --   --   --  9.0 9.2 8.7   CO2 27  --   --   --  24 22 23   BUN 36.1*  --   --   --  50.8* 36.9* 36.1*   CR 4.89*  --   --   --  7.01* 5.31* 4.80*   * 142* 181* 107* 136* 102* 124*     CBC  Recent Labs   Lab 10/12/23  0644 10/11/23  0626 10/09/23  1109 10/08/23  0608   WBC 6.4 10.1 7.9 6.0   HGB 8.0* 8.6* 8.4* 9.0*   HCT 24.8* 27.3* 26.4* 28.2*   MCV 99 98 99 98   * 183 152 106*     Lab Results   Component Value Date    AST 12 10/12/2023    ALT 8 10/12/2023    ALKPHOS 129 10/12/2023    BILITOTAL 0.8 10/12/2023    CHANDLER 14 (L) 10/05/2023     Lab Results   Component Value Date    INR 1.55 (H) 10/05/2023       Attestation:  I have reviewed today's vital signs, notes, medications, labs and imaging.    DO [facility] - Nephrology  Office: [phone]  Cell: [phone]

## 2023-10-12 NOTE — PROVIDER NOTIFICATION
Pt's BP 55/28 sitting up in bed. Pt just walked back from the bathroom. Reports lightheaded and dizziness. After laying down /74. Dr. Rockwell paged. Monitor BP Q6H. No other orders.

## 2023-10-12 NOTE — PROVIDER NOTIFICATION
Pt's BP 70-80's while standing, dizzy. 90's when sitting. Pt requesting multiple times to go for a walk but educated each time that with the low blood pressure it's unsafe as it could drop further and he could be at risk of a syncopal event. PT requesting PRN Midodrine but the only PRN is for dialysis. Dr Quiroz paged for PRN Midodrine. Dr. Quiroz wants to defer to day hospitalist. No new orders.

## 2023-10-12 NOTE — PROGRESS NOTES
[facility] Cardiology Progress Note  Date of Service: 10/12/2023  Staff Cardiologist: Dr. 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     Interval History:   S/p pericardiocentesis with removal of 560 ml on 10/11, minimal output overnight. Patient c/o chest discomfort at drain site. He remains hemodynamically stable.     Assessment:   Recurrent pericardial effusion   - Patient underwent pericardiocentesis on 9/29, follow-up echo with trivial effusion   - Repeat echo this admission with recurrent moderate to large effusion, no clinical signs of tamponade  - Recurrent effusions likely 2/2 ESRD and progressive liver disease   - s/p pericardiocentesis 10/11 with 560 mL removed  - RHC with normal R and L sided filling pressures, mildly elevated pulmonary pressure and portal hypertension    NSTEMI  - Patient presented with chest discomfort, EKG unremarkable  - Troponin 346 > 307    Paroxysmal atrial fibrillation   - Presented with wide-complex tachycardiac felt to be Afib s/p failed DCCV x 2 in ED  - Subsequently converted to sinus on amiodarone, now on oral amiodarone  - On heparin gtt [PTA apixaban 5 mg BID]    ESRD on HD  - Nephrology following, HD on MWF   - Awaiting renal transplant     Pulmonary embolism  - CT CAP showed PE, on heparin gtt     5. NAFLD s/p liver transplant in 2016, now with recurrent ascites and portal hypertension  - s/p paracentesis x 2 this admission  - RHC showed portal hypertension with pressure  gradient of 10 mmHg.   - GI following, appreciate assistance     Plan:  Plan for drain removal today.   Stop heparin gtt.   Will need DOAC prior to discharge.     Darline Frazier, CNP  Pager:  [phone]  (7am - 5pm, M-F)      Physical Exam   Temp: 98.1  F (36.7  C) Temp src: Oral BP: 96/54 Pulse: 78   Resp: 18 SpO2: 96 % O2 Device: Nasal cannula Oxygen Delivery: 3 LPM  Vitals:    10/10/23 0520 10/11/23 0529 10/12/23 0528   Weight: 92.8 kg (204 lb 8 oz) 89.4 kg (197 lb 3.2 oz) 87.7 kg (193 lb 4.8 oz)       Constitutional:   NAD   Skin:   Warm and dry   Head:   Nontraumatic   Neck:   no JVD   Lungs:   normal   Cardiovascular:   regular rate and rhythm   Abdomen:   Benign   Extremities and Back:   No edema   Neurological:   Grossly nonfocal       Medications    heparin Stopped (10/12/23 0931)    - MEDICATION INSTRUCTIONS -        - MEDICATION INSTRUCTIONS for Dialysis Patients -   Does not apply See Admin Instructions    albumin human  25-50 g Intravenous Once    amiodarone  200 mg Oral BID    amoxicillin-clavulanate  1 tablet Oral Q24H BIJU    gabapentin  300 mg Oral At Bedtime    guaiFENesin  600 mg Oral BID    Lacosamide  100 mg Oral QPM    lacosamide  50 mg Oral QAM    lidocaine (PF)  10 mL Subcutaneous Once    melatonin  3 mg Oral At Bedtime    midodrine  15 mg Oral TID w/meals    multivitamin RENAL  1 capsule Oral Daily    pantoprazole  40 mg Oral QAM AC    sevelamer carbonate  800 mg Oral 4x Daily    tacrolimus  1 mg Oral Q12H       Data     Most Recent 3 CBC's:  Recent Labs   Lab Test 10/12/23  0644 10/11/23  0626 10/09/23  1109   WBC 6.4 10.1 7.9   HGB 8.0* 8.6* 8.4*   MCV 99 98 99   * 183 152     Most Recent 3 BMP's:  Recent Labs   Lab Test 10/12/23  0644 10/12/23  0204 10/11/23  2114 10/11/23  1746 10/11/23  0626 10/10/23  0551   *  --   --   --  132* 134*   POTASSIUM 4.5  --   --   --  4.8 4.5   CHLORIDE 94*  --   --   --  92* 94*   CO2 27  --   --   --  24 22   BUN 36.1*  --   --   --   50.8* 36.9*   CR 4.89*  --   --   --  7.01* 5.31*   ANIONGAP 13  --   --   --  16* 18*   KELLY 8.9  --   --   --  9.0 9.2   * 142* 181*   < > 136* 102*    < > = values in this interval not displayed.     Most Recent 3 Troponin's:No lab results found.      Medical Decision Making       40 MINUTES SPENT BY ME on the date of service doing chart review, history, exam, documentation & further activities per the note.        Clinically Significant Risk Factors              # Hypoalbuminemia: Lowest albumin = 3 g/dL at 10/8/2023  6:08 AM, will monitor as appropriate     # Thrombocytopenia: Lowest platelets = 128 in last 2 days, will monitor for bleeding     # Hypertension: Noted on problem list        # Overweight: Estimated body mass index is 26.22 kg/m  as calculated from the following:    Height as of this encounter: 1.829 m (6').    Weight as of this encounter: 87.7 kg (193 lb 4.8 oz).

## 2023-10-12 NOTE — PROGRESS NOTES
"    GASTROENTEROLOGY PROGRESS NOTE    Date of Admission: 10/5/2023  Reason for Admission: palpitations / chest pain       ASSESSMENT:  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 2016 now with progressive cirrhosis (liver bx 12/2022) with evidence of portal hypertension and ascites, paroxysmal atrial fibrillation on Eliquis, DM type 2, history of CVA, hypertension, CHRISTIAN on BiPAP, chronic pericardial effusion, ESRD on dialysis who presented to [facility] ED on 10/5/23 with chest discomfort and palpitations, admitted with severe sepsis with septic shock requiring pressors secondary to multifactorial pneumonia, atrial fibrillation with aberrancy s/p 2 failed attempts at cardioversion, worsening pericardial effusion on ECHO s/p pericardiocentesis 10/11, and PE now on IV heparin. GI consulted for \"liver transplant, developing portal hypertension, initial tap concerning for sbp\".       # Cirrhosis secondary to NAFLD s/p liver transplant 2016 now with progressive cirrhosis with evidence of portal hypertension and ascites   # Chylous ascites   # Abdominal pain   - Patient follows with Dr. Simms, though notably has not been seen in years; last clinic visit 4/21/2020 with plan for 6 month follow up. Next appt 12/2023   - Patient presents with palpitations and chest discomfort but also notes worsening abdominal distention and pain that began around the same time. He has required paracentesis in the past but states this was back in 2016.    - PTA on tacrolimus, continued. Tacro level normal 10/11  - Admit labs with normal LFTs other than mildly elevated alk phos which has normalized today, INR 1.55. Liver bx 12/2022 with evidence of cirrhosis   - CT A/P with \"Prior liver transplant. Multiple upper abdominal collaterals and splenomegaly suggestive of portal hypertension, with small to moderate abdominopelvic ascites.\"  - s/p paracentesis 10/6 with 3L removed with " 1119 nucleated cells, differential returned and not consistent with SBP, triglycerides >400 consistent with chylous ascites. Cytology negative for malignancy. Cultures are negative including aerobic culture, anaerobic culture and fungal.   - Started on Zosyn 10/5-10/12 for pneumonia and possible SBP (no evidence of SBP on peritoneal fluid analysis 10/6), switched to Augmentin for pneumonia per hospitalist  -  s/p repeat paracentesis 10/10: cell count with diff pending, triglycerides pending. Cultures NGTD  - s/p RHC 10/11 which showed normal right and left sided filling pressures, mild elevated pulmonary pressures and portal hypertension with hepatic vein pressure gradient of 10mmHg   - Plan to repeat paracentesis today to obtain cytology and flow cytometry. No plans for repeat liver biopsy.      # Diarrhea  - Loose stools began 10/9, per nursing charting patient having 1-2 stools per day   - No hx of Cdiff  - Has been on Zosyn since 10/5 and given one dose of vancomycin 10/5  - Cdiff negative 10/10  - Could be secondary to bowel edema/poor absorption from portal hypertension vs antibiotics      # Recurrent pericardial effusion  # Paroxysmal atrial fibrillation   # NSTEMI  - PTA on Eliquis   - Cardiology following  - ECHO 10/10 with recurrent pericardial effusion slightly larger than seen on ECHO 10/8 s/p pericardiocentesis 10/11 with 560cc removed   - s/p RHC 10/11 which showed normal right and left sided filling pressures, mild elevated pulmonary pressures and portal hypertension with hepatic vein pressure gradient of 10mmHg      # Pulmonary embolism  - Noted on CT scan   - Currently on heparin gtt     # ESRD on HD  - Nephrology following, HD on MWF  - On kidney transplant list       RECOMMENDATIONS:  - Paracentesis today to obtain flow cytometry and cytology   - Hold tacrolimus for now, recheck level in morning and resume after level results   - Continue PPI     GI will continue to follow     Thank you for  involving us in this patient's care. Please do not hesitate to contact the GI service with any questions or concerns.     Pt care plan discussed with Dr. Leventhal, GI staff physician.    Overall time spent on the date of this encounter preparing to see the patient (including chart review of available notes, clinical status events, imaging and labs); obtaining and/or reviewing separately obtained history; ordering medications, tests or procedures; communicating with other health care professionals; and documenting the above clinical information in the electronic medical record was 45 minutes.    Martha Medrano PA-C  GI Service  [facility]  Text Page (Monday-Friday, 8am-4pm)    To page the [facility] GI provider 24 hours a day, please click [facility] and select GASTROENTEROLOGY MEDICINE ADULT / [facility] in the drop-down menu.   _______________________________________________________________      Subjective: Nursing notes and 24hr events reviewed. S/p RHC and pericardiocentesis with 560cc bloody fluid removed. Patient is lethargic, wakens to voice but easily falls back asleep but is oriented x3. He is concerned about his blood pressure, requesting medication for it so he can get up and walk so he does not become deconditioned. Reports generalized abdominal pain, thinks the distention is better. No nausea or vomiting. Reports ongoing loose stools. Per nursing charting patient is having 1-2 bowel movements per day.     ROS:   4 pt ROS negative unless noted in subjective.     Medications:  Current Facility-Administered Medications   Medication    - MEDICATION INSTRUCTIONS for Dialysis Patients -    acetaminophen (TYLENOL) tablet 650 mg    Or    acetaminophen (TYLENOL) Suppository 650 mg    albumin human 25 % injection 25-50 g    amiodarone (PACERONE) tablet 200 mg    benzocaine-menthol (CHLORASEPTIC) 6-10 MG lozenge 1 lozenge    calcium carbonate (TUMS) chewable tablet 500 mg     camphor-menthol (DERMASARRA) lotion    glucose gel 15-30 g    Or    dextrose 50 % injection 25-50 mL    Or    glucagon injection 1 mg    gabapentin (NEURONTIN) capsule 300 mg    guaiFENesin (MUCINEX) 12 hr tablet 600 mg    heparin infusion 25,000 units in D5W 250 mL ANTICOAGULANT    HYDROmorphone (DILAUDID) injection 0.2 mg    hydrOXYzine (ATARAX) tablet 25 mg    Lacosamide (VIMPAT) tablet 100 mg    lacosamide (VIMPAT) tablet 50 mg    melatonin tablet 3 mg    midodrine (PROAMATINE) tablet 10 mg    midodrine (PROAMATINE) tablet 10 mg    midodrine (PROAMATINE) tablet 15 mg    multivitamin RENAL (TRIPHROCAPS) capsule 1 capsule    naloxone (NARCAN) injection 0.2 mg    Or    naloxone (NARCAN) injection 0.4 mg    Or    naloxone (NARCAN) injection 0.2 mg    Or    naloxone (NARCAN) injection 0.4 mg    oxyCODONE-acetaminophen (PERCOCET) 7.5-325 MG per tablet 1 tablet    pantoprazole (PROTONIX) EC tablet 40 mg    Patient is already receiving anticoagulation with heparin, enoxaparin (LOVENOX), warfarin (COUMADIN)  or other anticoagulant medication    piperacillin-tazobactam (ZOSYN) 2.25 g vial to attach to  ml bag    QUEtiapine (SEROquel) tablet 50 mg    rOPINIRole (REQUIP) tablet 0.5 mg    sevelamer carbonate (RENVELA) tablet 800 mg    sodium chloride 0.9% BOLUS 100-150 mL    tacrolimus (GENERIC) capsule 1 mg       Objective:  Blood pressure 96/54, pulse 78, temperature 98.1  F (36.7  C), temperature source Oral, resp. rate 18, height 1.829 m (6'), weight 87.7 kg (193 lb 4.8 oz), SpO2 93%.  Gen: Lying in bed, in NAD  HEENT: NCAT. Conjunctiva clear. Sclera anicteric    CV: Regular rate  Resp: Non-labored breathing on supplemental O2   Abd: Soft, non tender, distended, no guarding or rebound   Skin:  No jaundice  Ext: warm, well perfused   Mental status/Psych: Sleepy, but oriented to person, place, month     PROCEDURES:  Paracentesis 10/10 with 4.65 L of  straw colored fluid was drained. There were no immediate  complications.     Paracentesis 10/6 with 3L  cloudy, turbid milky fluid aspirated into vacuum bottles. Fluid sent for infection, LDH, glucose and chylomicrons      Liver bx 12/2022   Final Diagnosis   LIVER ALLOGRAFT, NEEDLE BIOPSY:  Autolyzed/poorly preserved liver with evidence of cirrhosis  (See Comment)     LABS:  BMP  Recent Labs   Lab 10/12/23  0644 10/12/23  0204 10/11/23  2114 10/11/23  1746 10/11/23  0626 10/10/23  0551 10/09/23  1109   *  --   --   --  132* 134* 134*   POTASSIUM 4.5  --   --   --  4.8 4.5 3.9   CHLORIDE 94*  --   --   --  92* 94* 95*   KELLY 8.9  --   --   --  9.0 9.2 8.7   CO2 27  --   --   --  24 22 23   BUN 36.1*  --   --   --  50.8* 36.9* 36.1*   CR 4.89*  --   --   --  7.01* 5.31* 4.80*   * 142* 181* 107* 136* 102* 124*     CBC  Recent Labs   Lab 10/12/23  0644 10/11/23  0626 10/09/23  1109 10/08/23  0608   WBC 6.4 10.1 7.9 6.0   RBC 2.50* 2.78* 2.68* 2.89*   HGB 8.0* 8.6* 8.4* 9.0*   HCT 24.8* 27.3* 26.4* 28.2*   MCV 99 98 99 98   MCH 32.0 30.9 31.3 31.1   MCHC 32.3 31.5 31.8 31.9   RDW 15.4* 15.2* 14.9 14.6   * 183 152 106*     INRNo lab results found in last 7 days.  LFTs  Recent Labs   Lab 10/12/23  0644 10/11/23  0626 10/10/23  0551 10/09/23  1109   ALKPHOS 129 130* 152* 152*   AST 12 10 14 13   ALT 8 8 9 7   BILITOTAL 0.8 0.8 0.9 0.7   PROTTOTAL 6.3* 6.6 6.8 7.0   ALBUMIN 3.2* 3.5 3.5 3.5      PANCNo lab results found in last 7 days.  CULTURES:   7-Day Micro Results       Collected Updated Procedure Result Status      10/11/2023 1355 10/11/2023 1503 Albumin fluid [[id number]]    Pericardial Fluid from Pericardium    Final result Component Value Units   Albumin Fluid Source Pericardium    Albumin fluid 2.7 g/dL            10/11/2023 1355 10/11/2023 1719 Cell count with differential fluid [[id number]]    (Abnormal)   Pericardial Fluid from Pericardium    Final result Component Value   No component results            10/11/2023 1355 10/12/2023 0745 Cytology  non gyn [[id number]]   Pericardial Fluid from Pericardium    In process Component Value   No component results            10/11/2023 1355 10/11/2023 1430 Pericardial Fluid Aerobic Bacterial Culture Routine [[id number]]   Pericardial Fluid from Pericardium    In process Component Value   No component results               10/11/2023 1355 10/11/2023 1715 Gram stain [[id number]]   Pericardial Fluid from Pericardium    Final result Component Value   Gram Stain Result No organisms seen   Gram Stain Result 2+ WBC seen            10/11/2023 1355 10/11/2023 1503 Glucose fluid [[id number]]    Pericardial Fluid from Pericardium    Final result Component Value Units   Glucose Fluid Source Pericardium    Glucose fluid 79 mg/dL            10/11/2023 1355 10/11/2023 1503 Protein fluid [[id number]]    Pericardial Fluid from Pericardium    Final result Component Value Units   Protein Fluid Source Pericardium    Protein Total Fluid 4.9 g/dL            10/11/2023 1355 10/11/2023 1719 Cell Count Body Fluid [[id number]]    (Abnormal)   Pericardial Fluid from Pericardium    Final result Component Value Units   Color Red    Clarity Bloody    Cell Count Fluid Source Pericardium    Total Nucleated Cells 2,558 /uL            10/11/2023 1355 10/11/2023 1719 Differential Body Fluid [[id number]]    Pericardial Fluid from Pericardium    Final result Component Value Units   % Neutrophils 62 %   % Lymphocytes 16 %   % Monocyte/Macrophages 18 %   % Eosinophils 4 %            10/10/2023 1711 10/10/2023 2030 C. difficile Toxin B PCR with reflex to C. difficile Antigen and Toxins A/B EIA [[id number]]    Stool from Per Rectum    Final result Component Value   C Difficile Toxin B by PCR Negative   A negative result does not exclude actual disease due to C. difficile and may be due to improper collection, handling and storage of the specimen or the number of organisms in the specimen is below the detection limit of the assay.             10/10/2023 1313 10/10/2023 1336 Cell count with differential fluid [[id number]]    Ascites Fluid from Paracentesis    In process Component Value   No component results            10/10/2023 1313 10/10/2023 1438 Albumin fluid [[id number]]    Ascites Fluid from Paracentesis    Final result Component Value Units   Albumin Fluid Source Abdomen    Albumin fluid 1.3 g/dL            10/10/2023 1313 10/10/2023 1438 Protein fluid [[id number]]    Ascites Fluid from Paracentesis    Final result Component Value Units   Protein Fluid Source Abdomen    Protein Total Fluid 3.0 g/dL            10/10/2023 1313 10/11/2023 1058 Ascites Fluid Aerobic Bacterial Culture Routine [[id number]]   Ascites Fluid from Peritoneum    Preliminary result Component Value   Culture No growth, less than 1 day  [P]                10/10/2023 1313 10/10/2023 1336 Cell Count Body Fluid [[id number]]   Ascites Fluid from Paracentesis    In process Component Value   No component results            10/10/2023 1313 10/10/2023 1336 Differential Body Fluid [[id number]]   Ascites Fluid from Paracentesis    In process Component Value   No component results            10/06/2023 2110 10/06/2023 2211 Albumin fluid [[id number]]    Ascites Fluid from Abdomen    Final result Component Value Units   Albumin Fluid Source Peritoneum    Albumin fluid 1.6 g/dL            10/06/2023 2110 10/06/2023 2211 Protein fluid [[id number]]    Ascites Fluid from Abdomen    Final result Component Value Units   Protein Fluid Source Peritoneum    Protein Total Fluid 3.0 g/dL            10/06/2023 2110 10/11/2023 0024 Cytology non gyn [[id number]]   Ascites Fluid from Abdomen    Final result Component Value   Final Diagnosis This result contains rich text formatting which cannot be displayed here.   Clinical Information This result contains rich text formatting which cannot be displayed here.   Gross Description This result contains rich text formatting which cannot be  displayed here.   Microscopic Description This result contains rich text formatting which cannot be displayed here.   Performing Labs This result contains rich text formatting which cannot be displayed here.            10/06/2023 2050 10/10/2023 2326 Cell count with differential fluid [[id number]]    (Abnormal)   Peritoneal Fluid from Abdomen    Final result Component Value   No component results            10/06/2023 2050 10/11/2023 0721 Peritoneal Fluid Aerobic Bacterial Culture Routine with Gram Stain [[id number]]   Peritoneal Fluid from Abdomen    Final result Component Value   Culture No Growth   Gram Stain Result No organisms seen    2+ WBC seen               10/06/2023 2050 10/11/2023 2346 Anaerobic Bacterial Culture Routine [[id number]]   Ascites Fluid from Abdomen    Preliminary result Component Value   Culture No anaerobic organisms isolated after 5 days  [P]                10/06/2023 2050 10/11/2023 2331 Fungal or Yeast Culture Routine [[id number]]   Ascites Fluid from Peritoneum    Preliminary result Component Value   Culture No growth after 5 days  [P]                10/06/2023 2050 10/06/2023 2137 Lactate dehydrogenase fluid [[id number]]    Ascites Fluid from Abdomen    Final result Component Value Units   LD Fluid Source Peritoneum    Lactate dehydrogenase fluid 51 U/L            10/06/2023 2050 10/06/2023 2136 Glucose fluid [[id number]]    Ascites Fluid from Abdomen    Final result Component Value Units   Glucose Fluid Source Peritoneum    Glucose fluid 174 mg/dL            10/06/2023 2050 10/06/2023 2301 Cell Count Body Fluid [[id number]]    (Abnormal)   Peritoneal Fluid from Abdomen    Final result Component Value Units   Color Pink    Clarity Turbid    Body Fluid Comment       Cell Count Fluid Source Peritoneum    Total Nucleated Cells 1,119 /uL            10/06/2023 2050 10/10/2023 2326 Differential Body Fluid [[id number]]    Peritoneal Fluid from Abdomen    Final result Component  Value Units   % Neutrophils 1 %   % Lymphocytes 77 %   % Monocyte/Macrophages 6 %   % Lining Cells 4 %   % Other Cells 12 %   Absolute Neutrophils, Body Fluid 11.2 /uL                    IMAGING:  CT Chest PE, A/P on 10/5/23  IMPRESSION:  1.  Large pericardial effusion. This measures 25 mm in greatest depth which is suggestive of at least 500 mL of fluid.  2.  Pulmonary arteries are increased in size with the main pulmonary artery measuring 39 mm. This is consistent with pulmonary arterial hypertension. Segmental and subsegmental pulmonary artery emboli are seen to the left upper lobe. No definite right   heart strain.  3.  Small bilateral pleural effusions with the right effusion appearing chronically loculated with pleural thickening. Bilateral lung consolidations right greater than left suspicious for multifocal pneumonia.  4.  Prior liver transplant. Multiple upper abdominal collaterals and splenomegaly suggestive of portal hypertension, with small to moderate abdominopelvic ascites.  5.  Prominent gas and fluid-filled loops of small and large bowel likely reactive ileus.     US LE on 10/5/23  IMPRESSION:   The right CFV and GSV are unable to be assessed due to the existing  arterial line. Exam is otherwise negative for deep vein thrombosis  bilaterally.     US RUE on 10/5/23  IMPRESSION:   1. Negative for right upper extremity DVT.  2. Short segment DVT in the right cephalic vein at the antecubital  fossa, which may be related to prior intravenous access.     US LUE on 10/5/23  IMPRESSION:  1. Negative for DVT in the left upper extremity.  2. Patent AV fistula with good blood flow volume of 1920 mL/min.  Moderate stenosis in the mid to upper outflow cephalic vein with  diameter reduced to 2.6 mm.     CXR on 10/5/23  IMPRESSION: Cardiac enlargement. Dual-lumen central line tip right atrium. Bibasilar atelectasis or infiltrate and small effusions.

## 2023-10-12 NOTE — PROGRESS NOTES
[facility]    Medicine Progress Note - Hospitalist Service    Date of Admission:  10/5/2023    Assessment & Plan     Blanco Osborne is a 58 year old male with extensive medical history that includes liver transplant in 2016 due to alcoholic liver disease, now with progressive cirrhosis and ascites, paroxysmal atrial fibrillation, on anticoagulation with Eliquis, diabetes mellitus type 2, previous CVA, hypertension, CHRISTIAN on BiPAP, chronic pericardial effusion, ESRD on hemodialysis who presented on 10/5/2023 with palpitations and chest discomfort.       He was hypotensive and had wide-complex tachycardia felt to be atrial fibrillation with aberrancy.  He failed 2 attempts of emergent cardioversion.  He was started on amiodarone and subsequently converted to sinus rhythm. He is now on po amiodarone     He received IV fluids but remained hypotensive and subsequently received pressors that were discontinued on 10/7. He had severe sepsis likely due to SBP and pneumonia. Blood and ascitic fluid cultures were negative. He was intially on vancomycin and zosyn. Vancomycin was discontinued on 10/8.     Troponins were elevated, felt to be demand ischemia.  Echo showed pericardial effusion without tamponade.  CT C/A/P showed PE and pulmonary infiltrates.  He was started on IV heparin.      Acute hypoxic respiratory failure-Resolved - multifactorial  -Clinically patient is on room air since yesterday night and was a combination of likely pneumonia versus fluid overload       Severe sepsis with septic shock - due to multifactorial pneumonia -resolved  Chronic hypotension, on midodrine  -Is normally on midodrine for chronic hypotension    -Initially presented with septic shock which has now resolved.  Off pressors since 10/7/2023.  Blood pressure currently in normal range  -Continue midodrine for chronic hypotension and of note patient on nondialysis takes it on  as needed basis and after he was  transferred from ICU he has been on scheduled 15 3 times daily and in spite of that he does get hypotensive at times and we will plan to taper the dose from tomorrow if possible     End-stage liver disease, s/p liver transplant in 2016, now with recurrent ascites and Portal hypertension likely due to cirrhosis(status biopsy on 12/22 which showed poorly preserved liver with evidence of cirrhosis  s/p paracentesis on 10/6 and 10/11  Chylous effusion  -On presentation on admission patient was found to have effusion and paracentesis was done and the there were 1119 nucleated cells and triglycerides were elevated at more than 400 and it was chylous effusion and patient has been treated with IV Zosyn for presumed SBP  - his differential count from 10/6 shows predominantly lymphocytes and ANC is only 11 and he does not have SBP and his cultures have been negative including aerobic, anaerobic and fungal  -Status repeat paracentesis on 10/11 and 4.6 L of fluid was removed and triglycerides are elevated at 503, chylous effusion, local cultures again are negative and cell count is pending  -GI team from [place] has been following the patient and they recommended right heart cath his concern was that his ascites started at the time when he had cardiopulmonary symptoms and If his pressures are not elevated then future test may include transjugular liver biopsy and or hepatic vein wedge pressure measurement and that decision will be up to the transplant team of Dr. Simms and they are in touch with him  -Status right heart cath on 10/11 which shows normal right and left-sided filling pressures, mild elevated pulmonary pressures and portal hypertension with hepatic vein pressure gradient of 10 mmHg  -Most likely cause of portal hypertension is cirrhosis as evident on biopsy done on 12/22  -Continue with his immunosuppressive regimen with tacrolimus and level checked on 10/11 were 8 and I have talked with the GI team from  [place] and they will check with transplant hepatology about appropriate levels and if they are high then dose will need to be adjusted and they will take care of it as per my communication with them today       Multifocal pneumonia, organism unspecified  - CT scan showed pulmonary infiltrates   - COVID 19/influenza/RSV negative  - blood cultures negative till date from 10/5  - unable to provide sputum specimen  -vancomycin was discontinued on 10/8  - continue IV zosyn for now and will plan To switch to oral Augmentin from today to complete 10-day course    Diarrhea-improved  -C. difficile has been negative on 10/10     Pulmonary embolism in segmental and subsegmental pulmonary arteries of left upper lobe, without cor pulmonale -  Short segment  thrombophlebitis in right cephalic vein at antecubital fossa, likely related to prior intravenous access  -Eliquis held on admission and was started on IV heparin  - Anticoagulation with apixaban was interrupted for at least 2 days during previous hospitalization for pericardiocentesis.  Will transition back to apixaban when no more procedures required   - No DVT in extremities  -Continue with heparin drip for now and will need to be sure before starting DOAC that no further procedures are needed     Recurrent chronic pericardial effusion, s/p pericardiocentesis on 9/29.  Drain was removed on 9/30.   Status repeat pericardiocentesis on 10/11  - Felt to be due to volume overload due to renal failure   -  TTE 10/7 showed moderate pericardial effusion, slightly larger than seen on 10/5, with no evidence of tamponade physiology  - echo on 10/9 showed moderate to large bloody cardial effusion and as compared to 10/7 it has increased, IVC is mildly dilated, mild RV diastolic collapse and has inflow velocity variation consistent with hemodynamic compromise  -Status repeat pericardiocentesis with removal of 560 mL of fluid and it was bloody, red and there were 2558 nucleated  cells, no organisms, 2 WBCs, glucose of 79, total protein of 4.9 and albumin level of 2.7 and ADA is pending along with cytology and he had post procedure repeat echo which showed excellent results after removal of fluid  -Drain was removed today  -Repeat echo as per cardiology      Recurrent chest discomfort  -He has been having on and off chest discomfort and initially during the course of hospitalization his troponin did peak at 346 on 10/5 and it was thought to be due to demand because of shock and EKG did not had any ST elevation and patient had rapid response yesterday and repeat troponin yesterday showed troponins have trended down to 307  -Echo as above  -Patient did undergo right heart cath but he will need ischemic evaluation at some point in time as per cardiology and no evidence of any chest discomfort today       Chronic paroxysmal atrial fibrillation, on anticoagulation with Eliquis  Wide-complex tachycardia on admission felt to be atrial fibrillation with aberrant conduction, failed cardioversion x 2  -Patient was started on amiodarone and converted to normal sinus rhythm and his Eliquis was held and he was started on heparin drip  -Cardiology has been following the patient and we will continue with the heparin drip today and plan is to transition to oral Eliquis when no more procedures are required     ESRD, on hemodialysis q. Monday, Wednesday, Friday  S/p left AV fistula pseudoaneurysm repair, 8/2023  His AV fistula on the left upper extremity infiltrated on 10/6-  - continue dialysis per nephrology and continue with renal multivitamin and sevelamer        Small bilateral pleural effusions  Chronically loculated right pleural effusion  - stable         Generalized anxiety disorder  Restless leg syndrome  -On hydroxyzine and ropinirole        Chronic anemia-due to anemia of chronic disease  -Hemoglobin is baseline between 8-9  -Hemoglobin is stable today at 8.0 on 10/12  -We will check labs  intermittently     chronic thrombocytopenia  -Platelet count this morning is 128    Seizure disorder  - continue Vimpat     GERD  -Continue PPI     Probable CHRISTIAN  --Per patient's wife, he had a sleep study about 10 years ago but does not have any CPAP or BiPAP at home  -Did use BiPAP last night  -Need to  undergo sleep study as outpatient after discharge     Previous embolic strokes     Generalized weakness  -Continue with physical therapy and Occupational Therapy.     Pain at multiple locations  -Has pain in left upper extremity due to infiltration of AV fistula, pain in right upper extremity due to thrombophlebitis and frequent lab draws.  Also complains of diffuse pain in shoulders and upper back  -continue Tylenol as needed  -IV Dilaudid 0.2 mg every 6 hours as needed and Percocet every 8 hours as needed             Diet: Snacks/Supplements Adult: Gelatein Plus; Between Meals  Renal Diet (dialysis)    DVT Prophylaxis: Heparin drip  Nixon Catheter: Not present  Lines: PRESENT      Hemodialysis Vascular Access Right Subclavian-Site Assessment: WDL      Cardiac Monitoring: ACTIVE order. Indication: Tachyarrhythmias, acute (48 hours)  Code Status: Full Code      Clinically Significant Risk Factors              # Hypoalbuminemia: Lowest albumin = 3 g/dL at 10/8/2023  6:08 AM, will monitor as appropriate     # Thrombocytopenia: Lowest platelets = 128 in last 2 days, will monitor for bleeding     # Hypertension: Noted on problem list        # Overweight: Estimated body mass index is 26.22 kg/m  as calculated from the following:    Height as of this encounter: 1.829 m (6').    Weight as of this encounter: 87.7 kg (193 lb 4.8 oz).               Disposition Plan     Expected Discharge Date: 10/12/2023      Destination: home with family;inpatient rehabilitation facility  Discharge Comments: 10-11 angio and pericardiocentesis and HD          Deloris Hamilton MD  Hospitalist Service  [facility]  Hospital  Securely message with [email or web address] (more info)  Text page via [employer] Paging/Directory   _    Patient is critically ill and his prognosis is guarded  _____________________________________________________________________    Interval History     Reviewed events of overnight and patient was hypotensive when he was walking and did not had lightheadedness or dizziness.  No fever or chills.  Drain is having excellent output.  He does not have any chest pain but does have discomfort at the site of fistula      Discussed plan of care with the patient and the nurse.  And I also met with his wife and brother.    I also showed them the results of the biopsy done on 12/22.  They had questions which answered to satisfaction and I also discussed plan of care with the patient's nurse, GI team, reviewed cardiology note      Physical Exam     Temp: 98.1  F (36.7  C) Temp src: Oral BP: 96/54 Pulse: 78   Resp: 18 SpO2: 93 % O2 Device: None (Room air) Oxygen Delivery: 3 LPM         General: Patient appears comfortable and in no acute distress.  Respiratory: Lungs are clear to auscultation bilaterally with no wheeze or crackles and has right-sided permacath  Cardiovascular: Regular rate , S1 and S2 normal with no murmer or rubs or gallops  Abdomen:   soft , non tender , mild distended distended and bowel sound present   Skin: No skin rashes   Neurologic:  No facial droop  Musculoskeletal: Normal Range of motion over upper and lower extremities bilaterally   Psychiatric: cooperative          Medical Decision Making       Time spent in care of patient is 58 minutes and I discussed plan of care with the patient, nurse , nephrology and GI and reviewed notes from cards and reviewed his labs      Data     I have personally reviewed the following data over the past 24 hrs:    6.4  \   8.0 (L)   / 128 (L)     134 (L) 94 (L) 36.1 (H) /  117 (H)   4.5 27 4.89 (H) \     ALT: 8 AST: 12 AP: 129 TBILI: 0.8   ALB: 3.2 (L) TOT PROTEIN: 6.3 (L)  LIPASE: N/A     Procal: N/A CRP: N/A Lactic Acid: 0.4       INR:  N/A PTT:  67 (H)   D-dimer:  N/A Fibrinogen:  N/A       Imaging results reviewed over the past 24 hrs:   Recent Results (from the past 24 hour(s))   Echocardiogram Limited    Narrative    [id number]  [id number]  [id number]  [id number]^MARCH^MARIA^KIMBERLEY     [facility]  Echocardiography Laboratory  [address] [place] [address]     Name: MARY JO CRAIN  MRN: [id number]  : 1965  Study Date: 10/11/2023 01:21 PM  Age: 58 yrs  Gender: Male  Patient Location: [facility]  Reason For Study: Pericardial Effusion  Ordering Physician: MARIA ORTEZ  Referring Physician: Ki Carter  Performed By: Kyle Angelo RDCS     BSA: 2.1 m2  Height: 72 in  Weight: 197 lb  BP: 97/78 mmHg  ______________________________________________________________________________  Procedure  Echo Guided Centesis Adult.  ______________________________________________________________________________  Interpretation Summary     Limited 2 D echocardiogram to guide pericardiocentesis     Pre-procedure there a moderate to large sized pericardial effusion on  modiified subcostal views with fearues suggestive of early tamponade.     Post removal of 560 ml fluid there is only a small residual pericardial  effusion with excellent expansion of RV and no features of tamponade.     The findings are consistent with successful pericardiocentesis  ______________________________________________________________________________  ______________________________________________________________________________  Report approved by: Dr. Ki Gtz 10/11/2023 04:35 PM     ______________________________________________________________________________      Cardiac Catheterization    Narrative      Right sided filling pressures are normal.    Left sided filling pressures are normal.    Mild elevated pulmonary hypertension.    Normal cardiac output level.    Large  pericardial effusion status post pericardiocentesis - 560 mL.   Normal right and left-sided filling pressures.  Normal cardiac output.  The elevated pulmonary pressures.  Portal hypertension with hepatic vein pressure gradient of 10 mmHg.

## 2023-10-13 ENCOUNTER — APPOINTMENT (OUTPATIENT)
Dept: OCCUPATIONAL THERAPY | Facility: CLINIC | Age: 58
DRG: 871 | End: 2023-10-13
Payer: COMMERCIAL

## 2023-10-13 LAB
ABSOLUTE NEUTROPHILS, BODY FLUID: 7.2 /UL
ADENOSINE DEAMINASE PCAR-CCNC: 31 U/L
ALBUMIN SERPL BCG-MCNC: 2.9 G/DL (ref 3.5–5.2)
ALP SERPL-CCNC: 150 U/L (ref 40–129)
ALT SERPL W P-5'-P-CCNC: 7 U/L (ref 0–70)
ANION GAP SERPL CALCULATED.3IONS-SCNC: 14 MMOL/L (ref 7–15)
APPEARANCE FLD: ABNORMAL
APTT PPP: 64 SECONDS (ref 22–38)
AST SERPL W P-5'-P-CCNC: 12 U/L (ref 0–45)
BACTERIA FLD CULT: NORMAL
BILIRUB FLD-MCNC: 0.3 MG/DL
BILIRUB SERPL-MCNC: 0.6 MG/DL
BILIRUBIN BODY FLUID SOURCE: NORMAL
BUN SERPL-MCNC: 53.4 MG/DL (ref 8–23)
CALCIUM SERPL-MCNC: 8.7 MG/DL (ref 8.6–10)
CELL COUNT BODY FLUID SOURCE: ABNORMAL
CHLORIDE SERPL-SCNC: 89 MMOL/L (ref 98–107)
COLOR FLD: YELLOW
CREAT SERPL-MCNC: 6.37 MG/DL (ref 0.67–1.17)
DEPRECATED HCO3 PLAS-SCNC: 24 MMOL/L (ref 22–29)
EGFRCR SERPLBLD CKD-EPI 2021: 9 ML/MIN/1.73M2
ERYTHROCYTE [DISTWIDTH] IN BLOOD BY AUTOMATED COUNT: 15.8 % (ref 10–15)
GLUCOSE SERPL-MCNC: 141 MG/DL (ref 70–99)
HCT VFR BLD AUTO: 25.9 % (ref 40–53)
HGB BLD-MCNC: 8.3 G/DL (ref 13.3–17.7)
LIPASE BODY FLUID SOURCE: NORMAL
LIPASE FLD-CCNC: 37 U/L
LIPASE SERPL-CCNC: 24 U/L (ref 13–60)
LYMPHOCYTES NFR FLD MANUAL: 88 %
MCH RBC QN AUTO: 31.3 PG (ref 26.5–33)
MCHC RBC AUTO-ENTMCNC: 32 G/DL (ref 31.5–36.5)
MCV RBC AUTO: 98 FL (ref 78–100)
MONOS+MACROS NFR FLD MANUAL: 11 %
NEUTS BAND NFR FLD MANUAL: 1 %
PATH REPORT.COMMENTS IMP SPEC: NORMAL
PATH REPORT.FINAL DX SPEC: NORMAL
PATH REPORT.FINAL DX SPEC: NORMAL
PATH REPORT.GROSS SPEC: NORMAL
PATH REPORT.MICROSCOPIC SPEC OTHER STN: NORMAL
PATH REPORT.MICROSCOPIC SPEC OTHER STN: NORMAL
PATH REPORT.RELEVANT HX SPEC: NORMAL
PATH REPORT.RELEVANT HX SPEC: NORMAL
PATH REV: ABNORMAL
PLATELET # BLD AUTO: 143 10E3/UL (ref 150–450)
POTASSIUM SERPL-SCNC: 4.7 MMOL/L (ref 3.4–5.3)
PROT SERPL-MCNC: 6.2 G/DL (ref 6.4–8.3)
RBC # BLD AUTO: 2.65 10E6/UL (ref 4.4–5.9)
SODIUM SERPL-SCNC: 127 MMOL/L (ref 135–145)
TACROLIMUS BLD-MCNC: 5.6 UG/L (ref 5–15)
TME LAST DOSE: NORMAL H
TME LAST DOSE: NORMAL H
TRIGL FLD-MCNC: 303 MG/DL
TRIGLYCERIDE BODY FLUID SOURCE: NORMAL
WBC # BLD AUTO: 6.5 10E3/UL (ref 4–11)
WBC # FLD AUTO: 719 /UL

## 2023-10-13 PROCEDURE — 99222 1ST HOSP IP/OBS MODERATE 55: CPT | Performed by: INTERNAL MEDICINE

## 2023-10-13 PROCEDURE — 99233 SBSQ HOSP IP/OBS HIGH 50: CPT | Performed by: HOSPITALIST

## 2023-10-13 PROCEDURE — 80053 COMPREHEN METABOLIC PANEL: CPT | Performed by: HOSPITALIST

## 2023-10-13 PROCEDURE — 250N000011 HC RX IP 250 OP 636: Mod: JZ | Performed by: HOSPITALIST

## 2023-10-13 PROCEDURE — 250N000013 HC RX MED GY IP 250 OP 250 PS 637: Performed by: HOSPITALIST

## 2023-10-13 PROCEDURE — 86481 TB AG RESPONSE T-CELL SUSP: CPT | Performed by: DIETITIAN, REGISTERED

## 2023-10-13 PROCEDURE — 97110 THERAPEUTIC EXERCISES: CPT | Mod: GO

## 2023-10-13 PROCEDURE — 88112 CYTOPATH CELL ENHANCE TECH: CPT | Mod: 26

## 2023-10-13 PROCEDURE — 36415 COLL VENOUS BLD VENIPUNCTURE: CPT | Performed by: HOSPITALIST

## 2023-10-13 PROCEDURE — 85027 COMPLETE CBC AUTOMATED: CPT | Performed by: HOSPITALIST

## 2023-10-13 PROCEDURE — 120N000001 HC R&B MED SURG/OB

## 2023-10-13 PROCEDURE — 634N000001 HC RX 634: Mod: JZ | Performed by: INTERNAL MEDICINE

## 2023-10-13 PROCEDURE — 83690 ASSAY OF LIPASE: CPT | Performed by: DIETITIAN, REGISTERED

## 2023-10-13 PROCEDURE — 99233 SBSQ HOSP IP/OBS HIGH 50: CPT | Performed by: DIETITIAN, REGISTERED

## 2023-10-13 PROCEDURE — 80197 ASSAY OF TACROLIMUS: CPT | Performed by: INTERNAL MEDICINE

## 2023-10-13 PROCEDURE — 99233 SBSQ HOSP IP/OBS HIGH 50: CPT | Performed by: INTERNAL MEDICINE

## 2023-10-13 PROCEDURE — 36415 COLL VENOUS BLD VENIPUNCTURE: CPT | Performed by: INTERNAL MEDICINE

## 2023-10-13 PROCEDURE — 85730 THROMBOPLASTIN TIME PARTIAL: CPT | Performed by: INTERNAL MEDICINE

## 2023-10-13 PROCEDURE — 88305 TISSUE EXAM BY PATHOLOGIST: CPT | Mod: 26

## 2023-10-13 PROCEDURE — 80061 LIPID PANEL: CPT | Performed by: HOSPITALIST

## 2023-10-13 RX ADMIN — MIDODRINE HYDROCHLORIDE 15 MG: 5 TABLET ORAL at 13:16

## 2023-10-13 RX ADMIN — MIDODRINE HYDROCHLORIDE 10 MG: 5 TABLET ORAL at 10:23

## 2023-10-13 RX ADMIN — PANTOPRAZOLE SODIUM 40 MG: 40 TABLET, DELAYED RELEASE ORAL at 06:00

## 2023-10-13 RX ADMIN — APIXABAN 5 MG: 5 TABLET, FILM COATED ORAL at 21:15

## 2023-10-13 RX ADMIN — GUAIFENESIN 600 MG: 600 TABLET, EXTENDED RELEASE ORAL at 21:15

## 2023-10-13 RX ADMIN — OXYCODONE HYDROCHLORIDE AND ACETAMINOPHEN 1 TABLET: 7.5; 325 TABLET ORAL at 21:19

## 2023-10-13 RX ADMIN — HYDROMORPHONE HYDROCHLORIDE 0.2 MG: 0.2 INJECTION, SOLUTION INTRAMUSCULAR; INTRAVENOUS; SUBCUTANEOUS at 15:40

## 2023-10-13 RX ADMIN — Medication 1 CAPSULE: at 13:16

## 2023-10-13 RX ADMIN — LACOSAMIDE 100 MG: 50 TABLET, FILM COATED ORAL at 19:37

## 2023-10-13 RX ADMIN — MELATONIN TAB 3 MG 3 MG: 3 TAB at 21:15

## 2023-10-13 RX ADMIN — AMIODARONE HYDROCHLORIDE 200 MG: 200 TABLET ORAL at 13:17

## 2023-10-13 RX ADMIN — EPOETIN ALFA-EPBX 10000 UNITS: 10000 INJECTION, SOLUTION INTRAVENOUS; SUBCUTANEOUS at 11:33

## 2023-10-13 RX ADMIN — MIDODRINE HYDROCHLORIDE 15 MG: 5 TABLET ORAL at 08:16

## 2023-10-13 RX ADMIN — SEVELAMER CARBONATE 800 MG: 800 TABLET, FILM COATED ORAL at 13:17

## 2023-10-13 RX ADMIN — OXYCODONE HYDROCHLORIDE AND ACETAMINOPHEN 1 TABLET: 7.5; 325 TABLET ORAL at 13:19

## 2023-10-13 RX ADMIN — OXYCODONE HYDROCHLORIDE AND ACETAMINOPHEN 1 TABLET: 7.5; 325 TABLET ORAL at 03:49

## 2023-10-13 RX ADMIN — SEVELAMER CARBONATE 800 MG: 800 TABLET, FILM COATED ORAL at 17:01

## 2023-10-13 RX ADMIN — SEVELAMER CARBONATE 800 MG: 800 TABLET, FILM COATED ORAL at 19:37

## 2023-10-13 RX ADMIN — GABAPENTIN 300 MG: 300 CAPSULE ORAL at 21:15

## 2023-10-13 RX ADMIN — AMOXICILLIN AND CLAVULANATE POTASSIUM 1 TABLET: 500; 125 TABLET, FILM COATED ORAL at 13:16

## 2023-10-13 RX ADMIN — GUAIFENESIN 600 MG: 600 TABLET, EXTENDED RELEASE ORAL at 13:16

## 2023-10-13 RX ADMIN — HYDROMORPHONE HYDROCHLORIDE 0.2 MG: 0.2 INJECTION, SOLUTION INTRAMUSCULAR; INTRAVENOUS; SUBCUTANEOUS at 06:00

## 2023-10-13 RX ADMIN — LACOSAMIDE 50 MG: 50 TABLET, FILM COATED ORAL at 13:16

## 2023-10-13 RX ADMIN — ACETAMINOPHEN 650 MG: 325 TABLET, FILM COATED ORAL at 08:03

## 2023-10-13 RX ADMIN — HYDROXYZINE HYDROCHLORIDE 25 MG: 25 TABLET, FILM COATED ORAL at 13:31

## 2023-10-13 RX ADMIN — MIDODRINE HYDROCHLORIDE 15 MG: 5 TABLET ORAL at 17:01

## 2023-10-13 RX ADMIN — SEVELAMER CARBONATE 800 MG: 800 TABLET, FILM COATED ORAL at 07:30

## 2023-10-13 RX ADMIN — AMIODARONE HYDROCHLORIDE 200 MG: 200 TABLET ORAL at 21:15

## 2023-10-13 ASSESSMENT — ACTIVITIES OF DAILY LIVING (ADL)
ADLS_ACUITY_SCORE: 24
ADLS_ACUITY_SCORE: 25
ADLS_ACUITY_SCORE: 24
ADLS_ACUITY_SCORE: 25
ADLS_ACUITY_SCORE: 24
ADLS_ACUITY_SCORE: 25

## 2023-10-13 NOTE — CONSULTS
"CLINICAL NUTRITION SERVICES  -  ASSESSMENT NOTE    Recommendations Ordered by Registered Dietitian (RD):   - Room service assistance to help patient with extremely restrictive diet.  - Already receiving gelatein plus BID, pt may order w/ meals as well. Noted that Ensure Clear is also appropriate for a low fat diet - pt may order if desired.   - Diet per GI: Low Sodium, Very Low Fat diet(10 g fat)   - Calorie Counts began this morning, will run x3 days.    Malnutrition:   % Weight Loss:  None noted - fluid shifts may be impacting / masking losses  % Intake:  No decreased intake noted  Subcutaneous Fat Loss:  Orbital region mild depletion and Upper arm region mild depletion  Muscle Loss:  Temporal region moderate depletion, Clavicle bone region moderate depletion, and Dorsal hand region moderate depletion  Fluid Retention:  None noted    Malnutrition Diagnosis: Moderate malnutrition  In Context of:  Chronic illness or disease     REASON FOR ASSESSMENT  Blanco Osborne is a 58 year old male seen by Registered Dietitian for Provider Order - \"Calorie counts with restrictive diet (Very low fat/Dialysis) to eval need FT/TF, ed for new component of very low fat diet and assist with optimizing po with chylous ascites\"    NUTRITION HISTORY  - Information obtained from chart review and visit w/ patient, family. Visit centered on current diet restriction, and nutrition POC in setting of highly restrictive diet. Our nutrition associate came in to assist with meal ordering, and finally ID entered the room for their consult. Did not speak with patient about how his intakes had been outside the hospital given everything else going on. Pt was energetic and involved in discussion, with a good appetite.     - Pt is well-known to this writer from a very long admission earlier this year. He required TF for some time, and eventually was able to remove gtube in April with evidence of adequate intakes.     CURRENT NUTRITION ORDERS  Diet " "Order:     Renal and Very Low Fat Diet (up to 10g)  Gelatein plus BID between meals  Calorie Count x3 days, beginning today      Current Intake/Tolerance:  Pt has been ordering and eating fairly well this admission. % of most meals ordered, 1 instance of 50% consumption. Good appetite recorded for most meals. Per meal review:     10/12 - pt ordered 3060 kcal, 130 g protein (3 meals, 2 supplements - consumed 100% of at least 2 of the meals, nothing else documented)  10/11 - pt ordered 2420 kcal, 118 g protein (2 meals, 2 supplements) - 50% of one meal recorded, nothing else documented.   10/10 - pt ordered 1740 kcal, 113 g protein (2 meals, 1 supplement) - no intakes were documented.     - He and his family member were understandably feeling frustrated with ordering this afternoon, as the Low Fat diet went into effect after his breakfast was ordered - he had ordered almost 30 g of fat in breakfast alone. Out of fairness and need to provide meals to patient for the remainder of the day, OK to order up to the usual meal limits despite the earlier discrepancy.     NUTRITION FOCUSED PHYSICAL ASSESSMENT FOR DIAGNOSING MALNUTRITION)  Yes             Observed:    Muscle wasting (refer to documentation in Malnutrition section) and Subcutaneous fat loss (refer to documentation in Malnutrition section)  - Pt looks much better than writer recalls back in March/April admission.     Obtained from Chart/Interdisciplinary Team:  10/10 - Paracentesis 4.65L straw colored fluid   10/12 - Paracentesis 3.4L cloudy, light brown fluid    10/13 - HD today     Per GI - \"would avoid MCT supplementation in setting of cirrhosis\" due to risk for narcosis/altering mentation.     ANTHROPOMETRICS  Height: 6' 0\"  Weight: 191 lbs 14.4 oz (87 kg)   Body mass index is 26.03 kg/m .  Weight Status:  Overweight BMI 25-29.9  IBW: 80.9 kg   % IBW: 108%  Weight History: Wts have been relatively stable. Fluid shifts may impact trends, pt on dialysis " with ascites.   Wt Readings from Last 25 Encounters:   10/13/23 87 kg (191 lb 14.4 oz)   10/03/23 86.2 kg (190 lb)   10/01/23 87.9 kg (193 lb 11.2 oz)   09/21/23 88.5 kg (195 lb)   09/11/23 86.6 kg (191 lb)   09/05/23 85.7 kg (189 lb)   08/16/23 88.2 kg (194 lb 8 oz)   08/10/23 88.2 kg (194 lb 8 oz)   07/13/23 83.9 kg (185 lb)   07/25/23 86.2 kg (190 lb)   07/11/23 86.6 kg (191 lb)   06/05/23 82.1 kg (181 lb)   05/30/23 84.5 kg (186 lb 4.8 oz)   05/04/23 86.2 kg (190 lb)   05/02/23 83.5 kg (184 lb)   04/26/23 82.3 kg (181 lb 7 oz)   01/21/23 82.4 kg (181 lb 11.2 oz)       Date/Time Weight Weight Method   10/13/23 0540 87 kg (191 lb 14.4 oz) Standing scale   10/12/23 0528 87.7 kg (193 lb 4.8 oz) Standing scale   10/11/23 0529 89.4 kg (197 lb 3.2 oz) Standing scale   10/10/23 0520 92.8 kg (204 lb 8 oz) Standing scale   10/09/23 0620 94.2 kg (207 lb 11.2 oz) Standing scale   10/08/23 0000 92.6 kg (204 lb 2.3 oz) Bed scale   10/07/23 0645 91.2 kg (201 lb 1 oz) --   10/05/23 0745 93.2 kg (205 lb 7.5 oz) Bed scale   10/05/23 0400 89 kg (196 lb 3.4 oz) --   10/05/23 0342 86.2 kg (190 lb)        LABS  Labs reviewed  Na 127 (L)  BUN 53.4 (H), Cr 6.37 (H) - HD, nephrology managing  AlkPhos 150 (H)      MEDICATIONS  Medications reviewed  Renal multivitamin    Renvela 4x daily     ASSESSED NUTRITION NEEDS PER APPROVED PRACTICE GUIDELINES:  Dosing Weight 87 kg   Estimated Energy Needs: 5196-8545+ kcals (25-30+ Kcal/Kg)  Justification: maintenance, on dialysis   Estimated Protein Needs: 104-157 grams protein (1.2-1.8 g pro/Kg)  Justification: Repletion  Estimated Fluid Needs: 1 mL/kcal   Justification: maintenance    MALNUTRITION:  % Weight Loss:  None noted - fluid shifts may be impacting / masking losses  % Intake:  No decreased intake noted  Subcutaneous Fat Loss:  Orbital region mild depletion and Upper arm region mild depletion  Muscle Loss:  Temporal region moderate depletion, Clavicle bone region moderate depletion, and  Dorsal hand region moderate depletion  Fluid Retention:  None noted    Malnutrition Diagnosis: Moderate malnutrition  In Context of:  Chronic illness or disease    NUTRITION DIAGNOSIS:  Altered GI function related to digestion of fat as evidenced by very low fat diet ordered.     NUTRITION INTERVENTIONS  Recommendations / Nutrition Prescription  - Room service assistance to help patient with extremely restrictive diet.  - Already receiving gelatein plus BID, pt may order w/ meals as well. Noted that Ensure Clear is also appropriate for a low fat diet - pt may order if desired.   - Diet per GI: Low Sodium, Very Low Fat diet(10 g fat)   - Calorie Counts began this morning, will run x3 days.     Implementation  Nutrition education: Provided education on Low Fat diet:   Assessed learning needs, learning preferences, and willingness to learn  Nutrition Education (Content):  Provided handout   Low Fat Diet  Menu provided with low fat items highlighted  Discussed   Daily fat limit  Supplements   Room service w/ assist   Nutrition Education (Application):  Discussed eating habits and recommended alternative food choices  Patient verbalizes understanding of diet   Anticipate good compliance  Diet Education - refer to Education Flowsheet    Medical Food Supplement and Collaboration and Referral of Nutrition care    Nutrition Goals  Intake of at least 2/3 nutrition needs to avoid recommendation for TF.     MONITORING AND EVALUATION:  Progress towards goals will be monitored and evaluated per protocol and Practice Guidelines    Edna Osuna RD, LD  Pager: [phone]  Weekend Pager: [phone]

## 2023-10-13 NOTE — CONSULTS
[facility]    Infectious Disease Consultation     Date of Admission:  10/5/2023  Date of Consult (When I saw the patient): 10/13/23    Assessment & Plan   Blanco Osborne is a 58 year old who was admitted on 10/5/2023.     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 s/p liver transplant  in 2016 now with progressive cirrhosis  with evidence of portal hypertension   Other PMH include: paroxysmal atrial fibrillation on Eliquis, DM type 2, history of CVA, hypertension, CHRISTIAN on BiPAP, chronic pericardial effusion, ESRD on dialysis '  Presented with chest discomfort and palpitations  Found to have pericardial fluid which was tapped, no positive micro   Peritoneal fluid tapped again no positive micro   Previously asitic fluid has been checked for AFB organism none found   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 Atypical infection ? Other cause.   If repeat paracentesis is being pursued get AFB cultures and stain   Would recommend evaluation with transplant ID team at [facility].       Gurwinder Lanza MD    Reason for Consult   Reason for consult: I was asked to evaluate this patient for ascites.     Primary Care Physician   Ki Carter    Chief Complaint   Abdomen distention     History is obtained from the patient and medical records    History of Present Illness   Blanco Osborne is a 58 year old male who presented palpitation generally feeling unwell. Diagnosed with PE but also treated as pneumonia and the PE is a liver transplant patient with ascites with high white count though conventional bacterial cultures are negative still on zosyn day  9 no fever or any other positive micro through out the hospital stay in his now for more than a week     Past Medical History   I have reviewed this patient's medical history and updated it with pertinent information if needed.   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    Cancer (H) 2023    Squamous Cell Cancer- Since resolved    Cirrhosis of liver with ascites (H) 02/11/2016    Coagulopathy (H24)     Critical illness myopathy     Dialysis patient (H24)     DIALYSIS 3X/ WEEK    Encephalopathy     ESRD (end stage renal disease) on dialysis (H)     Gastroesophageal reflux disease     H/O alcohol abuse     History of blood transfusion     Hyperammonemia (H24)     Hyperlipidemia     Hypertension     Patients states that HTN has been resolved    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    Insomnia     Lactic acidosis 11/12/2022    Liver replaced by transplant (H) 09/20/2016    NAFLD (nonalcoholic fatty liver disease) 02/11/2016    CHRISTIAN on CPAP     Parainfluenza type 1 infection 11/19/2022    Parapneumonic effusion     Pleural effusion     Pneumothorax on left 12/16/2022    Respiratory acidosis 11/12/2022    Respiratory arrest (H) 11/12/2022    Restless legs syndrome (RLS)     SBP (spontaneous bacterial peritonitis) (H)     [facility]    Seizures (H) 12/2022    Sepsis due to Streptococcus pneumoniae with acute hypoxic respiratory failure (H) 11/19/2022    Stroke, embolic (H) 11/20/2022    Sudden cardiac arrest (H) 12/29/2022    PEA- 2 min of CPR    Tubular adenoma 10/2019    Large cecal adenoma- due for surveillance colonoscopy in 3 years (10/2022)       Past Surgical History   I have reviewed this patient's surgical history and updated it with pertinent information if  needed.  Past Surgical History:   Procedure Laterality Date    APPENDECTOMY      BENCH LIVER N/A 2016    Procedure: BENCH LIVER;  Surgeon: Enoc Crews MD;  Location: [facility] OR    BRONCHOSCOPY FLEXIBLE AND RIGID N/A 2022    Procedure: BRONCHOSCOPY;  Surgeon: Alena Valenzuela MD;  Location:  GI    COLONOSCOPY  2013    repeat in 2018    COLONOSCOPY N/A 10/04/2019    Procedure: COLONOSCOPY, WITH POLYPECTOMY AND BIOPSY;  Surgeon: Go Chong MD;  Location: [facility] OR    CREATE FISTULA ARTERIOVENOUS UPPER EXTREMITY Left 2023    Procedure: LEFT UPPER EXTREMITY ARTERIOVENOUS FISTULA CREATION. LIGATION OF COMPETING VASCULAR BRANCHES- LEFT;  Surgeon: Deejay Mcneil MD;  Location:  OR    CV PERICARDIOCENTESIS N/A 2023    Procedure: Pericardiocentesis;  Surgeon: Barry Mckeon MD;  Location:  [facility]ATH [facility]    HERNIA REPAIR      IR CHEST TUBE PLACEMENT NON-TUNNELED RIGHT  2022    IR CVC TUNNEL PLACEMENT > 5 YRS OF AGE  2022    IR DIALYSIS FISTULOGRAM LEFT  2023    IR GASTROSTOMY TUBE CHANGE  2023    IR GASTROSTOMY TUBE PERCUTANEOUS PLCMNT  2022    IR PARACENTESIS  2022    IR PARACENTESIS  2022    IR THORACENTESIS  2022    IR THORACENTESIS  2020    IR THORACENTESIS  08/10/2020    IR TRANSCATHETER BIOPSY  2022    REVISION FISTULA ARTERIOVENOUS UPPER EXTREMITY Left 8/15/2023    Procedure: 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 2; OUTFLOW REVISION CEPHALIC TO BRACHIAL VEIN;  Surgeon: Deejay Mcneil MD;  Location:  OR    TRACHEOSTOMY N/A 2022    Procedure: TRACHEOSTOMY;  Surgeon: Nilesh Jackson MD;  Location:  OR    TRANSPLANT LIVER RECIPIENT  DONOR N/A 2016    Procedure: TRANSPLANT LIVER RECIPIENT  DONOR;  Surgeon: Enoc Crews MD;  Location: [facility] OR       Prior to Admission Medications   Prior to Admission Medications   Prescriptions  Last Dose Informant Patient Reported? Taking?   Lacosamide (VIMPAT) 100 MG TABS tablet 10/4/2023 Spouse/Significant Other No Yes   Sig: Take 1 tablet (100 mg) by mouth every evening   acetaminophen (TYLENOL) 325 MG tablet prn at prn Spouse/Significant Other No Yes   Sig: Take 2 tablets (650 mg) by mouth every 4 hours as needed for other (For optimal non-opioid multimodal pain management to improve pain control.)   apixaban ANTICOAGULANT (ELIQUIS) 5 MG tablet 10/4/2023 Spouse/Significant Other No Yes   Sig: Take 1 tablet (5 mg) by mouth 2 times daily for 90 days   gabapentin (NEURONTIN) 100 MG capsule 10/4/2023 Spouse/Significant Other No Yes   Sig: Take 1 capsule (100 mg) by mouth daily   hydrOXYzine (ATARAX) 25 MG tablet prn at prn Spouse/Significant Other No Yes   Sig: Take 1 tablet (25 mg) by mouth 3 times daily as needed for itching   lacosamide (VIMPAT) 50 MG TABS tablet 10/4/2023 Spouse/Significant Other Yes Yes   Sig: Take 50 mg by mouth every morning   melatonin 3 MG tablet 10/4/2023 Spouse/Significant Other No Yes   Sig: Take 1 tablet (3 mg) by mouth At Bedtime   midodrine (PROAMATINE) 10 MG tablet prn at prn  Yes Yes   Sig: Take 10 mg by mouth 3 times daily   midodrine (PROAMATINE) 10 MG tablet 10/4/2023 at dialysis  Yes Yes   Sig: Take 10-20 mg by mouth 4 times daily as needed (On dialysis days) On dialysis days - patient takes 1 tablet one hour before dialysis, 1 tablet when he gets there, 1 tablet during dialysis and 1-2 tablets after dialysis. Max 5 tablets   multivitamin RENAL (TRIPHROCAPS) 1 capsule capsule 10/4/2023 Spouse/Significant Other No Yes   Sig: Take 1 capsule by mouth daily   oxyCODONE (ROXICODONE) 5 MG tablet prn at prn Spouse/Significant Other No Yes   Sig: Take 1 tablet (5 mg) by mouth every 4 hours as needed for moderate pain   rOPINIRole (REQUIP) 0.5 MG tablet prn at prn Spouse/Significant Other Yes Yes   Sig: Take 0.5 mg by mouth 2 times daily as needed (restless legs)   sevelamer  carbonate (RENVELA) 800 MG tablet 10/4/2023 Spouse/Significant Other Yes Yes   Sig: Take 800 mg by mouth 4 times daily With meals and snacks   tacrolimus (GENERIC EQUIVALENT) 1 MG capsule 10/4/2023 Spouse/Significant Other No Yes   Sig: Take 1 capsule (1 mg) by mouth every 12 hours      Facility-Administered Medications: None     Allergies   No Known Allergies    Immunization History   Immunization History   Administered Date(s) Administered    COVID-19 Monovalent 18+ (Moderna) 03/31/2021, 04/28/2021    FLU 6-35 months 01/08/2019    Influenza Vaccine 18-64 (Flublok) 10/07/2019    Influenza Vaccine >6 months (Alfuria,Fluzone) 12/01/2015, 12/14/2015, 10/31/2017, 01/08/2019, 09/21/2023    Pneumo Conj 13-V (2010&after) 07/10/2018    Pneumococcal 23 valent 03/15/2016    TDAP (Adacel,Boostrix) 03/15/2016, 05/07/2021    Td (Adult), Adsorbed 03/24/1995    Twinrix A/B 06/26/2015, 03/15/2016, 05/31/2016    Zoster recombinant adjuvanted (SHINGRIX) 09/21/2023       Social History   I have reviewed this patient's social history and updated it with pertinent information if needed. Blanco Osborne  reports that he has never smoked. He has never used smokeless tobacco. He reports that he does not currently use alcohol. He reports that he does not use drugs.    Family History   I have reviewed this patient's family history and updated it with pertinent information if needed.   Family History   Problem Relation Age of Onset    Coronary Artery Disease No family hx of     Cardiomyopathy No family hx of        Review of Systems   The 10 point Review of Systems is negative    Physical Exam   Temp: 97.7  F (36.5  C) Temp src: Oral BP: 99/73 Pulse: 81   Resp: 17 SpO2: 98 % O2 Device: None (Room air) Oxygen Delivery: 1 LPM  Vital Signs with Ranges  Temp:  [97.7  F (36.5  C)-98.2  F (36.8  C)] 97.7  F (36.5  C)  Pulse:  [66-94] 81  Resp:  [11-23] 17  BP: ()/(53-75) 99/73  SpO2:  [92 %-98 %] 98 %  191 lbs 14.4 oz  Body mass index is  "26.03 kg/m .    GENERAL APPEARANCE:  awake  EYES: Eyes grossly normal to inspection  NECK: no adenopathy  RESP: lungs clear   CV: regular rates and rhythm  LYMPHATICS: normal ant/post cervical and supraclavicular nodes  ABDOMEN: distended   MS: extremities normal  SKIN: no suspicious lesions or rashes        Data   All laboratory and imaging data in the past 24 hours reviewed  No results for input(s): \"CULT\" in the last 168 hours.  Recent Labs   Lab Test 09/28/16  1600 09/23/16  1458 09/20/16  1901   CULT No VRE isolated No growth Culture negative for acid fast bacilli  Assayed at [facility] [address]    Culture negative after 4 weeks  No anaerobes isolated  No growth          All cultures:  Recent Labs   Lab 10/12/23  1113 10/11/23  1355 10/10/23  1313 10/06/23  2050   CULTURE No growth, less than 1 day No growth after 1 day No growth after 2 days No anaerobic organisms isolated  No growth after 6 days  No Growth      Blood culture:  Results for orders placed or performed during the hospital encounter of 10/05/23   Blood Culture Peripheral Blood    Specimen: Peripheral Blood   Result Value Ref Range    Culture No Growth    Blood Culture Peripheral Blood    Specimen: Peripheral Blood   Result Value Ref Range    Culture No Growth    Results for orders placed or performed during the hospital encounter of 01/21/23   Blood Culture Arm, Right    Specimen: Arm, Right; Blood   Result Value Ref Range    Culture No Growth    Blood Culture Peripheral Blood    Specimen: Peripheral Blood   Result Value Ref Range    Culture No Growth    Results for orders placed or performed during the hospital encounter of 12/29/22   Blood Culture Hand, Right    Specimen: Hand, Right; Blood   Result Value Ref Range    Culture No Growth    Blood Culture Hand, Left    Specimen: Hand, Left; Blood   Result Value Ref Range    Culture No Growth    Blood Culture Peripheral Blood    Specimen: Peripheral Blood   Result " Value Ref Range    Culture No Growth    Blood Culture Arm, Left    Specimen: Arm, Left; Blood   Result Value Ref Range    Culture No Growth    Results for orders placed or performed during the hospital encounter of 12/16/22   Blood Culture Peripheral Blood    Specimen: Peripheral Blood   Result Value Ref Range    Culture No Growth    Blood Culture Arm, Right    Specimen: Arm, Right; Blood   Result Value Ref Range    Culture No Growth    Results for orders placed or performed during the hospital encounter of 12/16/22   Blood Culture Peripheral Blood    Specimen: Peripheral Blood   Result Value Ref Range    Culture No Growth    Results for orders placed or performed during the hospital encounter of 11/12/22   Blood Culture Line, venous    Specimen: Line, venous; Blood   Result Value Ref Range    Culture No Growth    Blood Culture Hand, Right    Specimen: Hand, Right; Blood   Result Value Ref Range    Culture No Growth    Blood Culture Hand, Right    Specimen: Hand, Right; Blood   Result Value Ref Range    Culture No Growth    Blood Culture Hand, Left    Specimen: Hand, Left; Blood   Result Value Ref Range    Culture No Growth    Blood Culture Peripheral Blood    Specimen: Peripheral Blood   Result Value Ref Range    Culture No Growth    Blood Culture Peripheral Blood    Specimen: Peripheral Blood   Result Value Ref Range    Culture Positive on the 1st day of incubation (A)     Culture Streptococcus pneumoniae (AA)        Susceptibility    Streptococcus pneumoniae - KARAN     Levofloxacin 1.5 Susceptible ug/mL     Meropenem 0.012 Susceptible ug/mL     Vancomycin 0.50 Susceptible ug/mL     Penicillin (meningitis) 0.016 Susceptible ug/mL     Penicillin (non-meningitis) 0.016 Susceptible ug/mL     Penicillin(oral) 0.016 Susceptible ug/mL     Ceftriaxone (non-meningitis) 0.016 Susceptible ug/mL     Ceftriaxone (meningitis) 0.016 Susceptible ug/mL     *Note: Due to a large number of results and/or encounters for the requested  time period, some results have not been displayed. A complete set of results can be found in Results Review.      Urine culture:  No results found. However, due to the size of the patient record, not all encounters were searched. Please check Results Review for a complete set of results.

## 2023-10-13 NOTE — PROGRESS NOTES
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 ED on 10/5/23 with chest discomfort and palpitations, admitted with severe sepsis with septic shock requiring pressors secondary to multifactorial pneumonia, atrial fibrillation with aberrancy s/p 2 failed attempts at cardioversion, worsening pericardial effusion on ECHO s/p pericardiocentesis 10/11, and PE now on IV heparin. GI consulted for \"liver transplant, developing portal hypertension, initial tap concerning for sbp\".       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 Simms, though notably has not been seen in years; last clinic visit 4/21/2020 with plan for 6 month follow up. Next appt 12/2023   - Patient presents with palpitations and chest discomfort but also notes worsening abdominal distention and pain that began around the same time. He has required paracentesis in the past but states this was back in 2016.    - PTA on tacrolimus (1 mg q 12 hrs), resumed this adm. Tacro level 8 on 10/11 (supra-therapeutic for desired level 3-5) and suspect likely related to drug interaction with amiodarone (can increase levels).  Held Tacroliumus 10/12 and pending recheck 10/13.  - Admit labs with normal LFTs other than mildly elevated alk phos which has normalized today, INR 1.55 (10/5). Liver bx 12/2022 with " "evidence of cirrhosis. No plans for repeat liver biopsy.   - CT A/P (10/5) with \"Prior liver transplant. Multiple upper abdominal collaterals and splenomegaly suggestive of portal hypertension, with small to moderate abdominopelvic ascites.\"  Pancreas normal.  - Paracentesis:  -10/6: 3L (cloudy, turbid milky fluid), TNC 1119 (1% PMN), Alb 1.6 (serum 3.3), SAAG 1.7 (c/w portal HTN), T.PRO 3, Cx NGTD (aerobic/anaerobic/fungal),  , LDH 51, Glu 174, cytology negative for malignancy, no lipase/amylase  -10/10: 4.65 L (straw colored fluid),  (4% PMN), Alb 1.3 (serum 3.5), SAAG 3.4, T.PRO 3, Cx NGTD,    -10/12: 3.4 L (cloudy/light brown fluid).   (pending diff and % PMN), Alb 1 (serum 2.9), SAAG 2.8, T.PRO 2.4, Cx (aerobic) pending, flow cytometry pending, no TG rechecked.    - Started on Zosyn 10/5-10/12 for pneumonia and possible SBP (no evidence of SBP on peritoneal fluid analysis 10/6), switched to Augmentin (10/12-present) for pneumonia per hospitalist.  Decreased cell counts on repeat paracentesis but remain >500 with low PMNs, high T.PRO and pending repeat flow cytometry to evaluate for peritoneal carcinamatosis/malignancy, cx sent for aerobic organisms but would additionally obtain quantiferon gold TB given mycobacteria could also be in ddx (although low suspicion given quant gol negative 2/6/2023).    -Ascites likely related to portal HTN in the setting of Cirrhosis but further c/b disruption of lymphatics (d/t increased lymph flow and portal HTN seen with cirrhosis) leading to chylous ascites as reflective of persistent TG >200 in ascites fluid.  Currently on Renal dialysis diet and no fat (LCT) restriction trialed yet with ongoing TG elevations in ascites and requiring paracentesis every 2-4 days for relief of abd distention and suspect ascites re-accumulation exacerbated by chyle formation. High risk for developing malnutrition (PRO losses) and further immunocompromise with chyle " losses. RD not yet following.  Would avoid MCT supplementation in the setting of cirrhosis (increased risk for narcosis/altering mentation).    -Noted ongoing intermittent issues with hypotension (BP 94/68 today, lowest recorded 70/55 yesterday) despite midodrine TID.  Na 127 (134).  Paracentesis of 3.4 L yesterday but no IV Alb given as tapped <5 L (last Alb given on 10/6).     # Moderate protein-energy malnutrition  Reports has had a good appetite and intermittent decreases to po due to early satiety when abd distended but overall consuming % of meals.  Weight appears stable (no losses) per available records and patient/wife report but difficult to interpret in the setting of ascites. On exam, noted moderate muscle wasting, mild fat wasting.  Risk for worsening nutrition status with PRO losses and fat malabsorption with chylous ascites.    # Diarrhea - improving  - Loose stools began 10/9, per nursing charting patient having 1-2 stools per day at the most (none documented 10/12) and now improving with change of abx.  - Zosyn (10/5-10/12) and now on Augmentin (10/12-now)   - No hx of Cdiff, C.diff negative 10/10  - Could be secondary to bowel edema/poor absorption from portal hypertension vs antibiotics      # Recurrent pericardial effusion  # Paroxysmal atrial fibrillation   # NSTEMI  - PTA on Eliquis   - Cardiology following  - ECHO 10/10 with recurrent pericardial effusion slightly larger than seen on ECHO 10/8 s/p pericardiocentesis 10/11 with 560cc removed   - s/p RHC 10/11 which showed normal right and left sided filling pressures, mild elevated pulmonary pressures and portal hypertension with hepatic vein pressure gradient of 10mmHg      # Pulmonary embolism  - Noted on CT scan   - Currently on heparin gtt     # ESRD on HD  - Nephrology following, HD on MWF  - No hyperkalemia between HD runs.  Phos 6's between HD runs, on sevelamar for binder with meals.   - On kidney transplant list      RECOMMENDATIONS:  - Follow Tacrolimus drug levels: (called lab as level still not drawn 10/13)    -If drug level 3-5, resume Tacrolimus and continue to check level M/W/F to monitor levels.   -If drug level >5, continue to hold Tacrolimus and recheck level daily until within therapeutic range.  - Check Quantiferon Gold.  - Follow paracentesis studies - including flow cytology; add-on TG, lipase (with serum) and bilirubin to ascites studies.  - Monitor abd distention for need approp to repeat paracentesis (Dx and therapeutic).    -On next tap, repeat the following ascites studies: TG (to evaluate if improved post restriction to low fat diet), cell count with diff, cultures, Alb, T.PRO.  -If TG improved on next paracentesis, would continue very low fat (LCT) diet for 2-3 weeks then liberalize fat restriction for LCT challenge and repeat TG on next paracentesis to evaluate if chylous ascites persists.  -If chylous ascites persists despite compliance to low fat diet, consider IR consult for lymphoscintigraphy (possibly with SPECT CT) to locate site of lymphatic leak for embolization of leak.   -Hold diuretics for ascites management in the setting of hypotension.  - If hypotensive and sx orthostasis post paracentesis or HD runs, would rec give IV Alb 5%, 12.5-25 g (250-500 ml).  - Per discussion with Nephrology, approved to change to the following combination diet: Very Low Fat (<10 gm/day) + 2 gm Na+ (liberalized K/Phos restrictions but continue Na+ restriction for renal/cirrhosis and monitor K/Phos between HD runs).  - No MCT supplementation in the setting of Cirrhosis (high risk for narcosis)  - Calorie counts to evaluate adequacy of po (risk for inadequate intakes with need for restrictive diet) vs need to consider FT/TF.  - Nutrition consult to evaluate ina counts/need for TF, educate pt on minimal fat (LCT) diet and assist with increasing PRO/kcals with restrictive diet for chylous ascites.  - Continue PPI      GI will continue to follow     Thank you for involving us in this patient's care. Please do not hesitate to contact the GI service with any questions or concerns.     Pt care plan discussed with Dr. Leventhal, GI (Hepatology) staff physician.    Overall time spent on the date of this encounter preparing to see the patient (including chart review of available notes, clinical status events, imaging and labs); obtaining and/or reviewing separately obtained history; ordering medications, tests or procedures; communicating with other health care professionals; and documenting the above clinical information in the electronic medical record was 75 minutes.    Starla Lyles PA-C, RD  [facility] Inpatient Gastroenterology Service  Pager: *[phone]  Text Page (Monday-Friday, 8am-4pm)     To page the On-Call [facility] GI provider 24 hours a day, please click [facility] and select GASTROENTEROLOGY MEDICINE ADULT / [facility] in the drop-down menu.   _______________________________________________________________      Subjective: Nursing notes and 24hr events reviewed. NAEO.      Examined at 11:30 while on HD run.  Patient A&O x3 but occasionally repeats himself or repeats questions but is self aware when he does this.  Per patient and wife (Ofe) report, improved abd pain after paracentesis yesterday.  Occasionally feels lightheaded/dizzy either after HD run or paracentesis but has had no falls.  Denies fevers, chills, N/V.  Good appetite (trying to eat Gelatein for PRO supplement and Ricardo PRO bars) and eating % of meals and occasionally c/o early satiety when abd more distended inhibiting his po some but not significantly.  Described increased motivation to walk/exercise, denies any known weight losses but has noticed some decreased muscle mass and feels like he is getting stronger with po intakes and activity.    ROS:   4 pt ROS negative unless noted in subjective.     Medications:  Current Facility-Administered Medications    Medication     - MEDICATION INSTRUCTIONS for Dialysis Patients -     acetaminophen (TYLENOL) tablet 650 mg    Or     acetaminophen (TYLENOL) Suppository 650 mg     albumin human 25 % injection 25-50 g     amiodarone (PACERONE) tablet 200 mg     amoxicillin-clavulanate (AUGMENTIN) 500-125 MG per tablet 1 tablet     benzocaine-menthol (CHLORASEPTIC) 6-10 MG lozenge 1 lozenge     calcium carbonate (TUMS) chewable tablet 500 mg     camphor-menthol (DERMASARRA) lotion     glucose gel 15-30 g    Or     dextrose 50 % injection 25-50 mL    Or     glucagon injection 1 mg     epoetin mirta-epbx (RETACRIT) injection 10,000 Units     gabapentin (NEURONTIN) capsule 300 mg     guaiFENesin (MUCINEX) 12 hr tablet 600 mg     heparin infusion 25,000 units in D5W 250 mL ANTICOAGULANT     HYDROmorphone (DILAUDID) injection 0.2 mg     hydrOXYzine (ATARAX) tablet 25 mg     lacosamide (VIMPAT) tablet 100 mg     lacosamide (VIMPAT) tablet 50 mg     melatonin tablet 3 mg     midodrine (PROAMATINE) tablet 10 mg     midodrine (PROAMATINE) tablet 15 mg     multivitamin RENAL (TRIPHROCAPS) capsule 1 capsule     naloxone (NARCAN) injection 0.2 mg    Or     naloxone (NARCAN) injection 0.4 mg    Or     naloxone (NARCAN) injection 0.2 mg    Or     naloxone (NARCAN) injection 0.4 mg     No heparin via hemodialysis machine     oxyCODONE-acetaminophen (PERCOCET) 7.5-325 MG per tablet 1 tablet     pantoprazole (PROTONIX) EC tablet 40 mg     Patient is already receiving anticoagulation with heparin, enoxaparin (LOVENOX), warfarin (COUMADIN)  or other anticoagulant medication     QUEtiapine (SEROquel) half-tab 50 mg     rOPINIRole (REQUIP) tablet 0.5 mg     sevelamer carbonate (RENVELA) tablet 800 mg     sodium chloride 0.9% BOLUS 100-150 mL     sodium chloride 0.9% BOLUS 100-150 mL     sodium chloride 0.9% BOLUS 250 mL     sodium chloride 0.9% BOLUS 300 mL     [Held by provider] tacrolimus (GENERIC) capsule 1 mg       Objective:  Blood pressure 101/62,  pulse 76, temperature 98.2  F (36.8  C), temperature source Oral, resp. rate 16, height 1.829 m (6'), weight 87 kg (191 lb 14.4 oz), SpO2 92%.    Gen: Lying in bed while on HD run.  Visiting with wife at bedside.  In NAD  HEENT: NCAT. Conjunctiva clear. Sclera anicteric.  Mild-moderate temporal wasting. Mild periorbital fat wasting/darkening under eyes.  CV: RRR and SBP low 100s on monitor  Resp: Non-labored breathing on RA  Abd: Soft, non-distended, non tender in all quadrants, no guarding or rebound, well-healed chevron incision, paracentesis site covered with dressing/no leaking.  Skin:  No jaundice  MSK/Ext: warm, well perfused.  Mild-moderate b/l temporal, clavicular, interosseous and UE muscle wasting and squaring of shoulder joint.  Mental status/Psych: A&O x3, forgetful/asking repeat questions periodically during interview    PROCEDURES:  10/12: Paracentesis with 3.4 L of cloudy/light brown fluid drained.  There were no immediate complications.    10/10: Paracentesis with 4.65 L of  straw colored fluid was drained. There were no immediate complications.    10/6: Paracentesis with 3L  cloudy, turbid milky fluid aspirated into vacuum bottles.    Liver bx 12/2022   Final Diagnosis   LIVER ALLOGRAFT, NEEDLE BIOPSY:  Autolyzed/poorly preserved liver with evidence of cirrhosis  (See Comment)     LABS:  BMP  Recent Labs   Lab 10/12/23  0644 10/12/23  0204 10/11/23  2114 10/11/23  1746 10/11/23  0626 10/10/23  0551 10/09/23  1109   *  --   --   --  132* 134* 134*   POTASSIUM 4.5  --   --   --  4.8 4.5 3.9   CHLORIDE 94*  --   --   --  92* 94* 95*   KELLY 8.9  --   --   --  9.0 9.2 8.7   CO2 27  --   --   --  24 22 23   BUN 36.1*  --   --   --  50.8* 36.9* 36.1*   CR 4.89*  --   --   --  7.01* 5.31* 4.80*   * 142* 181* 107* 136* 102* 124*     CBC  Recent Labs   Lab 10/12/23  0644 10/11/23  0626 10/09/23  1109 10/08/23  0608   WBC 6.4 10.1 7.9 6.0   RBC 2.50* 2.78* 2.68* 2.89*   HGB 8.0* 8.6* 8.4* 9.0*    HCT 24.8* 27.3* 26.4* 28.2*   MCV 99 98 99 98   MCH 32.0 30.9 31.3 31.1   MCHC 32.3 31.5 31.8 31.9   RDW 15.4* 15.2* 14.9 14.6   * 183 152 106*     INRNo lab results found in last 7 days.  LFTs  Recent Labs   Lab 10/12/23  0644 10/11/23  0626 10/10/23  0551 10/09/23  1109   ALKPHOS 129 130* 152* 152*   AST 12 10 14 13   ALT 8 8 9 7   BILITOTAL 0.8 0.8 0.9 0.7   PROTTOTAL 6.3* 6.6 6.8 7.0   ALBUMIN 3.2* 3.5 3.5 3.5      PANCNo lab results found in last 7 days.  CULTURES:   7-Day Micro Results       Collected Updated Procedure Result Status      10/12/2023 1113 10/12/2023 1136 Cell count with differential fluid [[id number]]    (Abnormal)   Ascites Fluid from Paracentesis    In process Component Value   No component results            10/12/2023 1113 10/12/2023 1235 Albumin fluid [[id number]]    Ascites Fluid from Paracentesis    Final result Component Value Units   Albumin Fluid Source Abdomen    Albumin fluid 1.0 g/dL            10/12/2023 1113 10/12/2023 1235 Protein fluid [[id number]]    Ascites Fluid from Paracentesis    Final result Component Value Units   Protein Fluid Source Abdomen    Protein Total Fluid 2.4 g/dL            10/12/2023 1113 10/12/2023 1136 Ascites Fluid Aerobic Bacterial Culture Routine [[id number]]   Ascites Fluid from Peritoneum    In process Component Value   No component results               10/12/2023 1113 10/12/2023 1137 Cytology, non-gynecologic [[id number]]   Ascites Fluid from Abdomen    In process Component Value   No component results            10/12/2023 1113 10/12/2023 1446 Cell Count Body Fluid [[id number]]    (Abnormal)   Ascites Fluid from Paracentesis    Preliminary result Component Value Units   Color Yellow  [P]     Clarity Turbid  [P]     Body Fluid Comment Slides sent for a path review.  [P]     Cell Count Fluid Source Abdomen  [P]     Total Nucleated Cells 719  [P]  /uL            10/12/2023 1113 10/12/2023 1136 Differential Body Fluid [[id number]]    Ascites Fluid from Paracentesis    In process Component Value   No component results            10/11/2023 1355 10/11/2023 1503 Albumin fluid [[id number]]    Pericardial Fluid from Pericardium    Final result Component Value Units   Albumin Fluid Source Pericardium    Albumin fluid 2.7 g/dL            10/11/2023 1355 10/11/2023 1719 Cell count with differential fluid [[id number]]    (Abnormal)   Pericardial Fluid from Pericardium    Final result Component Value   No component results            10/11/2023 1355 10/12/2023 0745 Cytology non gyn [[id number]]   Pericardial Fluid from Pericardium    In process Component Value   No component results            10/11/2023 1355 10/12/2023 1133 Pericardial Fluid Aerobic Bacterial Culture Routine [[id number]]   Pericardial Fluid from Pericardium    Preliminary result Component Value   Culture No growth, less than 1 day  [P]                10/11/2023 1355 10/11/2023 1715 Gram stain [[id number]]   Pericardial Fluid from Pericardium    Final result Component Value   Gram Stain Result No organisms seen   Gram Stain Result 2+ WBC seen            10/11/2023 1355 10/11/2023 1503 Glucose fluid [[id number]]    Pericardial Fluid from Pericardium    Final result Component Value Units   Glucose Fluid Source Pericardium    Glucose fluid 79 mg/dL            10/11/2023 1355 10/11/2023 1503 Protein fluid [[id number]]    Pericardial Fluid from Pericardium    Final result Component Value Units   Protein Fluid Source Pericardium    Protein Total Fluid 4.9 g/dL            10/11/2023 1355 10/11/2023 1719 Cell Count Body Fluid [[id number]]    (Abnormal)   Pericardial Fluid from Pericardium    Final result Component Value Units   Color Red    Clarity Bloody    Cell Count Fluid Source Pericardium    Total Nucleated Cells 2,558 /uL            10/11/2023 1355 10/11/2023 1719 Differential Body Fluid [[id number]]    Pericardial Fluid from Pericardium    Final result Component Value  Units   % Neutrophils 62 %   % Lymphocytes 16 %   % Monocyte/Macrophages 18 %   % Eosinophils 4 %            10/10/2023 1711 10/10/2023 2030 C. difficile Toxin B PCR with reflex to C. difficile Antigen and Toxins A/B EIA [[id number]]    Stool from Per Rectum    Final result Component Value   C Difficile Toxin B by PCR Negative   A negative result does not exclude actual disease due to C. difficile and may be due to improper collection, handling and storage of the specimen or the number of organisms in the specimen is below the detection limit of the assay.            10/10/2023 1313 10/12/2023 2238 Cell count with differential fluid [[id number]]    (Abnormal)   Ascites Fluid from Paracentesis    Final result Component Value   No component results            10/10/2023 1313 10/10/2023 1438 Albumin fluid [[id number]]    Ascites Fluid from Paracentesis    Final result Component Value Units   Albumin Fluid Source Abdomen    Albumin fluid 1.3 g/dL            10/10/2023 1313 10/10/2023 1438 Protein fluid [[id number]]    Ascites Fluid from Paracentesis    Final result Component Value Units   Protein Fluid Source Abdomen    Protein Total Fluid 3.0 g/dL            10/10/2023 1313 10/12/2023 0822 Ascites Fluid Aerobic Bacterial Culture Routine [[id number]]   Ascites Fluid from Peritoneum    Preliminary result Component Value   Culture No growth after 1 day  [P]                10/10/2023 1313 10/12/2023 2238 Cell Count Body Fluid [[id number]]    (Abnormal)   Ascites Fluid from Paracentesis    Final result Component Value Units   Color Pink    Clarity Turbid    Cell Count Fluid Source Abdomen    Total Nucleated Cells 761 /uL            10/10/2023 1313 10/12/2023 2238 Differential Body Fluid [[id number]]    Ascites Fluid from Paracentesis    Final result Component Value Units   % Neutrophils 4 %   % Lymphocytes 73 %   % Monocyte/Macrophages 22 %   % Eosinophils 1 %   % Lining Cells 1 %   % Other Cells 1 %    Absolute Neutrophils, Body Fluid 26.6 /uL            10/06/2023 2110 10/06/2023 2211 Albumin fluid [[id number]]    Ascites Fluid from Abdomen    Final result Component Value Units   Albumin Fluid Source Peritoneum    Albumin fluid 1.6 g/dL            10/06/2023 2110 10/06/2023 2211 Protein fluid [[id number]]    Ascites Fluid from Abdomen    Final result Component Value Units   Protein Fluid Source Peritoneum    Protein Total Fluid 3.0 g/dL            10/06/2023 2110 10/11/2023 0024 Cytology non gyn [[id number]]   Ascites Fluid from Abdomen    Final result Component Value   Final Diagnosis This result contains rich text formatting which cannot be displayed here.   Clinical Information This result contains rich text formatting which cannot be displayed here.   Gross Description This result contains rich text formatting which cannot be displayed here.   Microscopic Description This result contains rich text formatting which cannot be displayed here.   Performing Labs This result contains rich text formatting which cannot be displayed here.            10/06/2023 2050 10/10/2023 2326 Cell count with differential fluid [[id number]]    (Abnormal)   Peritoneal Fluid from Abdomen    Final result Component Value   No component results            10/06/2023 2050 10/11/2023 0721 Peritoneal Fluid Aerobic Bacterial Culture Routine with Gram Stain [[id number]]   Peritoneal Fluid from Abdomen    Final result Component Value   Culture No Growth   Gram Stain Result No organisms seen    2+ WBC seen               10/06/2023 2050 10/13/2023 0753 Anaerobic Bacterial Culture Routine [[id number]]   Ascites Fluid from Abdomen    Final result Component Value   Culture No anaerobic organisms isolated               10/06/2023 2050 10/12/2023 2331 Fungal or Yeast Culture Routine [[id number]]   Ascites Fluid from Peritoneum    Preliminary result Component Value   Culture No growth after 6 days  [P]                10/06/2023 2050  10/06/2023 2137 Lactate dehydrogenase fluid [[id number]]    Ascites Fluid from Abdomen    Final result Component Value Units   LD Fluid Source Peritoneum    Lactate dehydrogenase fluid 51 U/L            10/06/2023 2050 10/06/2023 2136 Glucose fluid [[id number]]    Ascites Fluid from Abdomen    Final result Component Value Units   Glucose Fluid Source Peritoneum    Glucose fluid 174 mg/dL            10/06/2023 2050 10/06/2023 2301 Cell Count Body Fluid [[id number]]    (Abnormal)   Peritoneal Fluid from Abdomen    Final result Component Value Units   Color Pink    Clarity Turbid    Body Fluid Comment       Cell Count Fluid Source Peritoneum    Total Nucleated Cells 1,119 /uL            10/06/2023 2050 10/10/2023 2326 Differential Body Fluid [[id number]]    Peritoneal Fluid from Abdomen    Final result Component Value Units   % Neutrophils 1 %   % Lymphocytes 77 %   % Monocyte/Macrophages 6 %   % Lining Cells 4 %   % Other Cells 12 %   Absolute Neutrophils, Body Fluid 11.2 /uL                    IMAGING:  CT Chest PE, A/P on 10/5/23  IMPRESSION:  1.  Large pericardial effusion. This measures 25 mm in greatest depth which is suggestive of at least 500 mL of fluid.  2.  Pulmonary arteries are increased in size with the main pulmonary artery measuring 39 mm. This is consistent with pulmonary arterial hypertension. Segmental and subsegmental pulmonary artery emboli are seen to the left upper lobe. No definite right   heart strain.  3.  Small bilateral pleural effusions with the right effusion appearing chronically loculated with pleural thickening. Bilateral lung consolidations right greater than left suspicious for multifocal pneumonia.  4.  Prior liver transplant. Multiple upper abdominal collaterals and splenomegaly suggestive of portal hypertension, with small to moderate abdominopelvic ascites.  5.  Prominent gas and fluid-filled loops of small and large bowel likely reactive ileus.     US LE on  10/5/23  IMPRESSION:   The right CFV and GSV are unable to be assessed due to the existing  arterial line. Exam is otherwise negative for deep vein thrombosis  bilaterally.     US RUE on 10/5/23  IMPRESSION:   1. Negative for right upper extremity DVT.  2. Short segment DVT in the right cephalic vein at the antecubital  fossa, which may be related to prior intravenous access.     US LUE on 10/5/23  IMPRESSION:  1. Negative for DVT in the left upper extremity.  2. Patent AV fistula with good blood flow volume of 1920 mL/min.  Moderate stenosis in the mid to upper outflow cephalic vein with  diameter reduced to 2.6 mm.     CXR on 10/5/23  IMPRESSION: Cardiac enlargement. Dual-lumen central line tip right atrium. Bibasilar atelectasis or infiltrate and small effusions.

## 2023-10-13 NOTE — PROGRESS NOTES
Renal Medicine Progress Note            Assessment/Plan:   1.  ESKD. MWF HD.      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  Pericardial effusion, ascites.      3. Recent Sepsis syndrome.  Was in ICU on pressors. ON zosyn for possible SBP.  Improved hemodynamics on 15mg mg TID midodrine. addition dose 10mg mid run during HD.      3.  PE.      4.  Hx liver transplant. New ascites, portal HTN.        Plan/Recs:  1) Plan HD today via CVC, 2kg UF goal which he has been tolerating. Weights lower, will need new target weight established for outpatient dialysis unit. Likely 86Kg or so.     Torsten Montaño DO  [facility] consultants  Office: [phone]  Cell: [phone]        Interval History:      Pericardial drain removed yesterday, TTE repeated and cards signed off. Additional paracentesis yesterday, additional 3.4L removed.     Weight today 87kg. Vitals at baseline,  systolic.     Pt feeling well, states ambulating on own, hoping to go home soon.       Medications and Allergies:      - MEDICATION INSTRUCTIONS for Dialysis Patients -   Does not apply See Admin Instructions    albumin human  25-50 g Intravenous Once    amiodarone  200 mg Oral BID    amoxicillin-clavulanate  1 tablet Oral Q24H BIJU    epoetin mirta-epbx  10,000 Units Intravenous Once in dialysis/CRRT    gabapentin  300 mg Oral At Bedtime    guaiFENesin  600 mg Oral BID    lacosamide  100 mg Oral QPM    lacosamide  50 mg Oral QAM    melatonin  3 mg Oral At Bedtime    midodrine  15 mg Oral TID w/meals    multivitamin RENAL  1 capsule Oral Daily    - MEDICATION INSTRUCTIONS -   Does not apply Once    pantoprazole  40 mg Oral QAM AC    sevelamer carbonate  800 mg Oral 4x Daily    sodium chloride 0.9%  250 mL Intravenous Once in dialysis/CRRT    sodium chloride 0.9%  300 mL Hemodialysis Machine Once    [Held by provider]  tacrolimus  1 mg Oral Q12H      No Known Allergies         Physical Exam:   Vitals were reviewed  BP 90/59   Pulse 70   Temp 98.2  F (36.8  C) (Oral)   Resp 23   Ht 1.829 m (6')   Wt 87 kg (191 lb 14.4 oz)   SpO2 96%   BMI 26.03 kg/m      Wt Readings from Last 3 Encounters:   10/13/23 87 kg (191 lb 14.4 oz)   10/03/23 86.2 kg (190 lb)   10/01/23 87.9 kg (193 lb 11.2 oz)       Intake/Output Summary (Last 24 hours) at 10/13/2023 0851  Last data filed at 10/13/2023 0757  Gross per 24 hour   Intake 830 ml   Output --   Net 830 ml       GENERAL APPEARANCE: alert, oriented  HEENT:  Eyes/ears/nose/neck grossly normal  RESP: lungs cta b c good efforts, no crackles, rhonchi or wheezes  CV: RRR, nl S1/S2, distant, no murmurs  ABDOMEN: distended, nontender  EXTREMITIES/SKIN: no c/c/rashes/lesions; no b/l leg edema  NEURO:  non-focal           Data:     BMP  Recent Labs   Lab 10/12/23  0644 10/12/23  0204 10/11/23  2114 10/11/23  1746 10/11/23  0626 10/10/23  0551 10/09/23  1109   *  --   --   --  132* 134* 134*   POTASSIUM 4.5  --   --   --  4.8 4.5 3.9   CHLORIDE 94*  --   --   --  92* 94* 95*   KELLY 8.9  --   --   --  9.0 9.2 8.7   CO2 27  --   --   --  24 22 23   BUN 36.1*  --   --   --  50.8* 36.9* 36.1*   CR 4.89*  --   --   --  7.01* 5.31* 4.80*   * 142* 181* 107* 136* 102* 124*     CBC  Recent Labs   Lab 10/12/23  0644 10/11/23  0626 10/09/23  1109 10/08/23  0608   WBC 6.4 10.1 7.9 6.0   HGB 8.0* 8.6* 8.4* 9.0*   HCT 24.8* 27.3* 26.4* 28.2*   MCV 99 98 99 98   * 183 152 106*     Lab Results   Component Value Date    AST 12 10/12/2023    ALT 8 10/12/2023    ALKPHOS 129 10/12/2023    BILITOTAL 0.8 10/12/2023    CHANDLER 14 (L) 10/05/2023     Lab Results   Component Value Date    INR 1.55 (H) 10/05/2023       Attestation:  I have reviewed today's vital signs, notes, medications, labs and imaging.    DO [facility] - Nephrology  Office: [phone]  Cell: [phone]

## 2023-10-13 NOTE — PLAN OF CARE
SHIFT NOTE     [id number]    Orientation: A+Ox4, forgetful at times  Activity: SBA w/ IV pole in room/hallways  Diet/BS Checks: 2 g sodium, very low fat diet. Calorie counts initiated. Supplements given between meals.   Tele:  NSR   IV Access/Drains: R PIV running heparin gtt @ 1200 units/hr  Pain Management: PRN Percocet (q8h)  Abnormal VS/Results: Mild hypotension (on scheduled midodrine), otherwise AVSS on room air.   Bowel/Bladder: Continent of B/B. Minimal UOP (baseline). LBM noted this morning.   Skin/Wounds: L dialysis fistula (ONEIL), R chest CVC, L groin & neck insertion sites (CDI).   Consults: Nephrology, GI, nephrology  D/C Disposition: pending

## 2023-10-13 NOTE — PLAN OF CARE
Goal Outcome Evaluation: 1930-0700    A&O x4, forgetful. Repeats himself often. SBA. VSS on RA now, was on 1L overnight. Tele NSR. Renal diet. Ambulated unit x2. Steady gait, denied dizziness. Heparin drip infusing through PIV @ 1200 units/hr. Anuria, on hemodialysis MWF. Had a formed BM this am. No BP/lab on left arm r/t fistula, infiltrated. Has R subclavian dialysis port. L groin and L jugular dressing WNL.  Band-aid on LUQ from drain removal CDI. Percocet x2 and IV Dilaudid x2 for left upper extremity pain. Daily weights. Heparin recheck this am.

## 2023-10-13 NOTE — PROGRESS NOTES
[facility]    Medicine Progress Note - Hospitalist Service    Date of Admission:  10/5/2023    Assessment & Plan     Blanco Osborne is a 58 year old male with extensive medical history that includes liver transplant in 2016 due to alcoholic liver disease, now with progressive cirrhosis and ascites, paroxysmal atrial fibrillation, on anticoagulation with Eliquis, diabetes mellitus type 2, previous CVA, hypertension, CHRISTIAN on BiPAP, chronic pericardial effusion, ESRD on hemodialysis who presented on 10/5/2023 with palpitations and chest discomfort.       He was hypotensive and had wide-complex tachycardia felt to be atrial fibrillation with aberrancy.  He failed 2 attempts of emergent cardioversion.  He was started on amiodarone and subsequently converted to sinus rhythm. He is now on po amiodarone     He received IV fluids but remained hypotensive and subsequently received pressors that were discontinued on 10/7. He had severe sepsis likely due to SBP and pneumonia. Blood and ascitic fluid cultures were negative. He was intially on vancomycin and zosyn. Vancomycin was discontinued on 10/8.     Troponins were elevated, felt to be demand ischemia.  Echo showed pericardial effusion without tamponade.  CT C/A/P showed PE and pulmonary infiltrates.  He was started on IV heparin.      Acute hypoxic respiratory failure-Resolved - multifactorial  -Clinically patient is on room air since yesterday night and was a combination of likely pneumonia versus fluid overload       Severe sepsis with septic shock - due to multifactorial pneumonia -resolved  Chronic hypotension, on midodrine  -Is normally on midodrine for chronic hypotension    -Initially presented with septic shock which has now resolved.  Off pressors since 10/7/2023.  Blood pressure currently in normal range  -Continue midodrine for chronic hypotension and of note patient on nondialysis takes it on  as needed basis and after he was  transferred from ICU he has been on scheduled 15 three times daily and in spite of that he does get hypotensive at times and we will plan to taper the dose from tomorrow if possible and may need albumin tommorow  -     End-stage liver disease, s/p liver transplant in 2016, now with recurrent ascites and Portal hypertension likely due to cirrhosis(status biopsy on 12/22 which showed poorly preserved liver with evidence of cirrhosis  s/p paracentesis on 10/6 and 10/11  Chylous ascites/ effusion  -On presentation on admission patient was found to have effusion and paracentesis was done and the there were 1119 nucleated cells and triglycerides were elevated at more than 400 and it was chylous effusion and patient has been treated with IV Zosyn for presumed SBP  - his differential count from 10/6 shows predominantly lymphocytes(77 per cent) and ANC is only 11.2 and he does not have SBP and his cultures have been negative including aerobic, anaerobic and fungal  -Status repeat paracentesis on 10/10 and 4.6 L of fluid was removed and triglycerides are elevated at 503, chylous effusion, local cultures again are negative and cell count  showed 761 nucleated cells with 73% lymphocytes, 22% monocytes and ANC of 26.6  -GI team from [place] has been following the patient and they recommended right heart cath his concern was that his ascites started at the time when he had cardiopulmonary symptoms and If his pressures are not elevated then future test may include transjugular liver biopsy and or hepatic vein wedge pressure measurement and that decision will be up to the transplant team of Dr. Simms and they are in touch with him  -Status right heart cath on 10/11 which shows normal right and left-sided filling pressures, mild elevated pulmonary pressures and portal hypertension with hepatic vein pressure gradient of 10 mmHg  -Most likely cause of portal hypertension is cirrhosis as evident on biopsy done on 12/22  - 10/12-His  tacrolimus levels were 8( 3-5 is desired level) discussed with the hepatology/GI team and they have held the tacrolimus levels with plan to repeat levels and he had repeat paracentesis done again and cell count, flow cytometry and non-GYN cytology pending  -10/13-Tacrolimus levels are still pending and we will continue to hold discussed with the GI team and they will evaluate and follow levels and restart when deemed appropriate  -I also discussed with the GI team and we will check QuantiFERON gold and his diet was changed to very low-fat, nutrition consult and follow the culture data as above and they have recommended on neck step to repeat acetic fluid studies and based on the studies further management.  I also consulted infectious disease to have their opinion about lymphocytosis  -We will also consult nutrition team to evaluate calorie count and educate patient on minimal fat diet with assisting increased calories       Multifocal pneumonia, organism unspecified  - CT scan showed pulmonary infiltrates   - COVID 19/influenza/RSV negative  - blood cultures negative till date from 10/5  - unable to provide sputum specimen  -vancomycin was discontinued on 10/8  - continue IV zosyn for now and was switched to oral Augmentin and he is already received 8 days and we will DC the same    Diarrhea-improved  -C. difficile has been negative on 10/10     Pulmonary embolism in segmental and subsegmental pulmonary arteries of left upper lobe, without cor pulmonale -  Short segment  thrombophlebitis in right cephalic vein at antecubital fossa, likely related to prior intravenous access  -Eliquis held on admission and was started on IV heparin  - Anticoagulation with apixaban was interrupted for at least 2 days during previous hospitalization for pericardiocentesis.  Will transition back to apixaban when no more procedures required   - No DVT in extremities  -I have discussed with cardiology and GI team and there are no  procedures planned and we will switch him to Eliquis     Recurrent chronic pericardial effusion, s/p pericardiocentesis on 9/29.  Drain was removed on 9/30.   Status repeat pericardiocentesis on 10/11  - Felt to be due to volume overload due to renal failure   -  TTE 10/7 showed moderate pericardial effusion, slightly larger than seen on 10/5, with no evidence of tamponade physiology  - echo on 10/9 showed moderate to large bloody cardial effusion and as compared to 10/7 it has increased, IVC is mildly dilated, mild RV diastolic collapse and has inflow velocity variation consistent with hemodynamic compromise  -Status repeat pericardiocentesis with removal of 560 mL of fluid and it was bloody, red and there were 2558 nucleated cells, no organisms, 2 WBCs, glucose of 79, total protein of 4.9 and albumin level of 2.7 and ADA is pending along with cytology and he had post procedure repeat echo which showed excellent results after removal of fluid  -Drain was removed on 10/12  -Repeat echo as per cardiology      Recurrent chest discomfort  -He has been having on and off chest discomfort and initially during the course of hospitalization his troponin did peak at 346 on 10/5 and it was thought to be due to demand because of shock and EKG did not had any ST elevation and patient had rapid response yesterday and repeat troponin yesterday showed troponins have trended down to 307  -Echo as above  -P discussed with cardiology and as per them his chest discomfort was likely due to pericardial effusion       Chronic paroxysmal atrial fibrillation, on anticoagulation with Eliquis  Wide-complex tachycardia on admission felt to be atrial fibrillation with aberrant conduction, failed cardioversion x 2  -Patient was started on amiodarone and converted to normal sinus rhythm and his Eliquis was held and he was started on heparin drip  -We will transition patient to Eliquis     ESRD, on hemodialysis q. Monday, Wednesday, Friday  S/p  left AV fistula pseudoaneurysm repair, 8/2023  His AV fistula on the left upper extremity infiltrated on 10/6-  - continue dialysis per nephrology and continue with renal multivitamin and sevelamer        Small bilateral pleural effusions  Chronically loculated right pleural effusion  - stable         Generalized anxiety disorder  Restless leg syndrome  -On hydroxyzine and ropinirole        Chronic anemia-due to anemia of chronic disease  -Hemoglobin is baseline between 8-9  -Hemoglobin is stable today at 8.3on 10/13  -We will check labs intermittently     chronic thrombocytopenia  -Platelet count this morning is 128    Seizure disorder  - continue Vimpat     GERD  -Continue PPI     Probable CHRISTIAN  --Per patient's wife, he had a sleep study about 10 years ago but does not have any CPAP or BiPAP at home  -Did use BiPAP last night  -Need to  undergo sleep study as outpatient after discharge     Previous embolic strokes     Generalized weakness  -Continue with physical therapy and Occupational Therapy.     Pain at multiple locations  -Has pain in left upper extremity due to infiltration of AV fistula, pain in right upper extremity due to thrombophlebitis and frequent lab draws.  Also complains of diffuse pain in shoulders and upper back  -continue Tylenol as needed  -IV Dilaudid 0.2 mg every 6 hours as needed and Percocet every 8 hours as needed             Diet: Snacks/Supplements Adult: Gelatein Plus; Between Meals  Calorie Counts  Room Service  Combination Diet 2 gm NA Diet; Very Low Fat Diet (up to 10g)  Snacks/Supplements Adult: Other; pt may order diet-appropriate supplements (gelatein or ensure clear) PRN; With Meals    DVT Prophylaxis: Heparin drip  Nixon Catheter: Not present  Lines: PRESENT      Hemodialysis Vascular Access Right Subclavian-Site Assessment: WDL  Hemodialysis Vascular Access Arteriovenous fistula Left Forearm-Site Assessment: Bruit present;Thrill present      Cardiac Monitoring: ACTIVE order.  Indication: Tachyarrhythmias, acute (48 hours)  Code Status: Full Code      Clinically Significant Risk Factors         # Hyponatremia: Lowest Na = 127 mmol/L in last 2 days, will monitor as appropriate      # Hypoalbuminemia: Lowest albumin = 2.9 g/dL at 10/13/2023  8:50 AM, will monitor as appropriate     # Thrombocytopenia: Lowest platelets = 128 in last 2 days, will monitor for bleeding     # Hypertension: Noted on problem list        # Overweight: Estimated body mass index is 25.92 kg/m  as calculated from the following:    Height as of this encounter: 1.829 m (6').    Weight as of this encounter: 86.7 kg (191 lb 1.6 oz).     # Moderate Malnutrition: based on nutrition assessment           Disposition Plan      Expected Discharge Date: 10/15/2023      Destination: home with family;inpatient rehabilitation facility  Discharge Comments: 10-11 angio and pericardiocentesis and HD          Deloris Hamilton MD  Hospitalist Service  [facility]  Securely message with [employer] (more info)  Text page via [employer] Paging/Directory   _    Patient is critically ill and his prognosis is guarded  _____________________________________________________________________    Interval History     Seen today and he was in the dialysis earlier in the day and later was walking in the hallway.  He denied any lightheadedness or dizziness.  His blood pressure can be on the lower side at times.  Discussed recommendation from the GI team and he understands.  Discussed plan of care with the patient, nurse and the wife.    Physical Exam     Temp: 97.7  F (36.5  C) Temp src: Oral BP: 99/73 Pulse: 81   Resp: 17 SpO2: 98 % O2 Device: None (Room air) Oxygen Delivery: 1 LPM         General: Patient appears comfortable and in no acute distress.  Respiratory: Lungs are clear to auscultation bilaterally with no wheeze or crackles and has right-sided permacath  Cardiovascular: Regular rate , S1 and S2 normal with no murmer or rubs or  gallops  Abdomen:   soft , non tender , mild distended distended and bowel sound present   Skin: No skin rashes   Neurologic:  No facial droop  Musculoskeletal: Normal Range of motion over upper and lower extremities bilaterally   Psychiatric: cooperative          Medical Decision Making       Time spent in care of patient is 51 minutes and I discussed plan of care with the patient, nurse , GI team in extensive detail s      Data     I have personally reviewed the following data over the past 24 hrs:    6.5  \   8.3 (L)   / 143 (L)     127 (L) 89 (L) 53.4 (H) /  141 (H)   4.7 24 6.37 (H) \     ALT: 7 AST: 12 AP: 150 (H) TBILI: 0.6   ALB: 2.9 (L) TOT PROTEIN: 6.2 (L) LIPASE: 24     INR:  N/A PTT:  64 (H)   D-dimer:  N/A Fibrinogen:  N/A       Imaging results reviewed over the past 24 hrs:   No results found for this or any previous visit (from the past 24 hour(s)).

## 2023-10-13 NOTE — PROGRESS NOTES
Potassium   Date Value Ref Range Status   10/13/2023 4.7 3.4 - 5.3 mmol/L Final   12/29/2022 3.4 3.4 - 5.3 mmol/L Final   04/20/2020 3.9 3.4 - 5.3 mmol/L Final     Potassium POCT   Date Value Ref Range Status   10/05/2023 3.6 3.4 - 5.3 mmol/L Final     Hemoglobin   Date Value Ref Range Status   10/13/2023 8.3 (L) 13.3 - 17.7 g/dL Final   04/20/2020 14.3 13.3 - 17.7 g/dL Final     Creatinine   Date Value Ref Range Status   10/13/2023 6.37 (H) 0.67 - 1.17 mg/dL Final   04/20/2020 1.06 0.66 - 1.25 mg/dL Final     Urea Nitrogen   Date Value Ref Range Status   10/13/2023 53.4 (H) 8.0 - 23.0 mg/dL Final   12/29/2022 46 (H) 7 - 30 mg/dL Final   04/20/2020 23 7 - 30 mg/dL Final     Sodium   Date Value Ref Range Status   10/13/2023 127 (L) 135 - 145 mmol/L Final     Comment:     Reference intervals for this test were updated on 09/26/2023 to more accurately reflect our healthy population. There may be differences in the flagging of prior results with similar values performed with this method. Interpretation of those prior results can be made in the context of the updated reference intervals.    04/20/2020 139 133 - 144 mmol/L Final     INR   Date Value Ref Range Status   10/05/2023 1.55 (H) 0.85 - 1.15 Final   10/07/2019 1.20 (H) 0.86 - 1.14 Final       DIALYSIS PROCEDURE NOTE  Hepatitis status of previous patient on machine log was checked and verified ok to use with this patients hepatitis status.  Patient dialyzed for 4 hrs. on a K2 bath with a net fluid removal of  2L.  A BFR of 400 ml/min was obtained via a CVC.      The treatment plan was discussed with Dr. Montaño during the treatment.    Total heparin received during the treatment: 0 units.      Line flushed, clamped and capped with heparin 1:1000 1.9 mL (1900  units) per lumen     Meds  given: retacrit, midodrine pre and mid run, see MAR  Complications: none      Person educated: pre-tx. Knowledge base substantial. Barriers to learning: none. Educated on procedure  via verbal mode. Patient verbalized understanding.      ICEBOAT? Timeout performed pre-treatment  I: Patient was identified using 2 identifiers  C:  Consent Signed Yes  E: Equipment preventative maintenance is current and dialysis delivery system OK to use  B:     Latest Reference Range & Units 09/27/23 17:50   Hep B Surface Agn Nonreactive  Nonreactive   Hepatitis B Surface Antibody Instrument Value <8.00 m[IU]/mL 0.12   Hepatitis B Surface Antibody   Nonreactive      O: Dialysis orders present and complete prior to treatment  A: Vascular access verified and assessed prior to treatment  T: Treatment was performed at a clinically appropriate time  ?: Patient was allowed to ask questions and address concerns prior to treatment  See Adult Hemodialysis flowsheet in [employer] for further details and post assessment.  Machine water alarm in place and functioning. Transducer pods intact and checked every 15min.   Patient sent to cath lab via bed post treatment..  Chlorine/Chloramine water system checked every 4 hours.  Outpatient Dialysis at [facility].     Patient repositioned every 2 hours during the treatment.  Post treatment report given to NIA Guardado RN regarding 2L of fluid removed and last BP of 99/73.

## 2023-10-14 ENCOUNTER — APPOINTMENT (OUTPATIENT)
Dept: OCCUPATIONAL THERAPY | Facility: CLINIC | Age: 58
DRG: 871 | End: 2023-10-14
Payer: COMMERCIAL

## 2023-10-14 ENCOUNTER — APPOINTMENT (OUTPATIENT)
Dept: PHYSICAL THERAPY | Facility: CLINIC | Age: 58
DRG: 871 | End: 2023-10-14
Payer: COMMERCIAL

## 2023-10-14 LAB
ALBUMIN SERPL BCG-MCNC: 2.9 G/DL (ref 3.5–5.2)
ALP SERPL-CCNC: 142 U/L (ref 40–129)
ALT SERPL W P-5'-P-CCNC: 7 U/L (ref 0–70)
ANION GAP SERPL CALCULATED.3IONS-SCNC: 14 MMOL/L (ref 7–15)
APTT PPP: 47 SECONDS (ref 22–38)
AST SERPL W P-5'-P-CCNC: 12 U/L (ref 0–45)
BILIRUB SERPL-MCNC: 0.5 MG/DL
BUN SERPL-MCNC: 35.3 MG/DL (ref 8–23)
C PNEUM DNA SPEC QL NAA+PROBE: NOT DETECTED
CALCIUM SERPL-MCNC: 8.8 MG/DL (ref 8.6–10)
CHLORIDE SERPL-SCNC: 93 MMOL/L (ref 98–107)
CHOLEST SERPL-MCNC: 87 MG/DL
CORTIS SERPL-MCNC: 11.7 UG/DL
CREAT SERPL-MCNC: 4.83 MG/DL (ref 0.67–1.17)
DEPRECATED HCO3 PLAS-SCNC: 23 MMOL/L (ref 22–29)
EGFRCR SERPLBLD CKD-EPI 2021: 13 ML/MIN/1.73M2
ERYTHROCYTE [DISTWIDTH] IN BLOOD BY AUTOMATED COUNT: 16.6 % (ref 10–15)
FLUAV H1 2009 PAND RNA SPEC QL NAA+PROBE: NOT DETECTED
FLUAV H1 RNA SPEC QL NAA+PROBE: NOT DETECTED
FLUAV H3 RNA SPEC QL NAA+PROBE: NOT DETECTED
FLUAV RNA SPEC QL NAA+PROBE: NOT DETECTED
FLUBV RNA SPEC QL NAA+PROBE: NOT DETECTED
GLUCOSE SERPL-MCNC: 146 MG/DL (ref 70–99)
HADV DNA SPEC QL NAA+PROBE: NOT DETECTED
HCOV PNL SPEC NAA+PROBE: NOT DETECTED
HCT VFR BLD AUTO: 25.2 % (ref 40–53)
HDLC SERPL-MCNC: 42 MG/DL
HGB BLD-MCNC: 7.9 G/DL (ref 13.3–17.7)
HMPV RNA SPEC QL NAA+PROBE: NOT DETECTED
HPIV1 RNA SPEC QL NAA+PROBE: NOT DETECTED
HPIV2 RNA SPEC QL NAA+PROBE: NOT DETECTED
HPIV3 RNA SPEC QL NAA+PROBE: NOT DETECTED
HPIV4 RNA SPEC QL NAA+PROBE: NOT DETECTED
LDLC SERPL CALC-MCNC: 32 MG/DL
M PNEUMO DNA SPEC QL NAA+PROBE: NOT DETECTED
MCH RBC QN AUTO: 31.5 PG (ref 26.5–33)
MCHC RBC AUTO-ENTMCNC: 31.3 G/DL (ref 31.5–36.5)
MCV RBC AUTO: 100 FL (ref 78–100)
NONHDLC SERPL-MCNC: 45 MG/DL
PLATELET # BLD AUTO: 145 10E3/UL (ref 150–450)
POTASSIUM SERPL-SCNC: 4.4 MMOL/L (ref 3.4–5.3)
PROT SERPL-MCNC: 6.1 G/DL (ref 6.4–8.3)
RBC # BLD AUTO: 2.51 10E6/UL (ref 4.4–5.9)
RSV RNA SPEC QL NAA+PROBE: NOT DETECTED
RSV RNA SPEC QL NAA+PROBE: NOT DETECTED
RV+EV RNA SPEC QL NAA+PROBE: NOT DETECTED
SODIUM SERPL-SCNC: 130 MMOL/L (ref 135–145)
TRIGL SERPL-MCNC: 65 MG/DL
TROPONIN T SERPL HS-MCNC: 320 NG/L
WBC # BLD AUTO: 8.4 10E3/UL (ref 4–11)

## 2023-10-14 PROCEDURE — 93010 ELECTROCARDIOGRAM REPORT: CPT | Performed by: INTERNAL MEDICINE

## 2023-10-14 PROCEDURE — 93005 ELECTROCARDIOGRAM TRACING: CPT

## 2023-10-14 PROCEDURE — 87633 RESP VIRUS 12-25 TARGETS: CPT | Performed by: HOSPITALIST

## 2023-10-14 PROCEDURE — 120N000001 HC R&B MED SURG/OB

## 2023-10-14 PROCEDURE — 99233 SBSQ HOSP IP/OBS HIGH 50: CPT | Performed by: HOSPITALIST

## 2023-10-14 PROCEDURE — 36415 COLL VENOUS BLD VENIPUNCTURE: CPT | Performed by: HOSPITALIST

## 2023-10-14 PROCEDURE — 250N000011 HC RX IP 250 OP 636: Performed by: HOSPITALIST

## 2023-10-14 PROCEDURE — 85730 THROMBOPLASTIN TIME PARTIAL: CPT | Performed by: HOSPITALIST

## 2023-10-14 PROCEDURE — 250N000013 HC RX MED GY IP 250 OP 250 PS 637: Performed by: HOSPITALIST

## 2023-10-14 PROCEDURE — 97110 THERAPEUTIC EXERCISES: CPT | Mod: GP

## 2023-10-14 PROCEDURE — 97110 THERAPEUTIC EXERCISES: CPT | Mod: GO

## 2023-10-14 PROCEDURE — 97116 GAIT TRAINING THERAPY: CPT | Mod: GP

## 2023-10-14 PROCEDURE — 250N000012 HC RX MED GY IP 250 OP 636 PS 637: Performed by: INTERNAL MEDICINE

## 2023-10-14 PROCEDURE — 85027 COMPLETE CBC AUTOMATED: CPT | Performed by: HOSPITALIST

## 2023-10-14 PROCEDURE — 87799 DETECT AGENT NOS DNA QUANT: CPT | Performed by: HOSPITALIST

## 2023-10-14 PROCEDURE — 80197 ASSAY OF TACROLIMUS: CPT | Performed by: DIETITIAN, REGISTERED

## 2023-10-14 PROCEDURE — 87389 HIV-1 AG W/HIV-1&-2 AB AG IA: CPT | Performed by: HOSPITALIST

## 2023-10-14 PROCEDURE — 80053 COMPREHEN METABOLIC PANEL: CPT | Performed by: HOSPITALIST

## 2023-10-14 PROCEDURE — 250N000009 HC RX 250: Performed by: INTERNAL MEDICINE

## 2023-10-14 PROCEDURE — 86682 HELMINTH ANTIBODY: CPT | Performed by: HOSPITALIST

## 2023-10-14 PROCEDURE — 87486 CHLMYD PNEUM DNA AMP PROBE: CPT | Performed by: HOSPITALIST

## 2023-10-14 PROCEDURE — 250N000011 HC RX IP 250 OP 636: Mod: JZ | Performed by: HOSPITALIST

## 2023-10-14 PROCEDURE — 84484 ASSAY OF TROPONIN QUANT: CPT | Performed by: HOSPITALIST

## 2023-10-14 PROCEDURE — 82533 TOTAL CORTISOL: CPT | Performed by: HOSPITALIST

## 2023-10-14 PROCEDURE — P9047 ALBUMIN (HUMAN), 25%, 50ML: HCPCS | Performed by: HOSPITALIST

## 2023-10-14 RX ORDER — MIDODRINE HYDROCHLORIDE 5 MG/1
10 TABLET ORAL
Status: DISCONTINUED | OUTPATIENT
Start: 2023-10-14 | End: 2023-10-23 | Stop reason: HOSPADM

## 2023-10-14 RX ORDER — ALBUMIN (HUMAN) 12.5 G/50ML
25 SOLUTION INTRAVENOUS ONCE
Status: COMPLETED | OUTPATIENT
Start: 2023-10-14 | End: 2023-10-14

## 2023-10-14 RX ORDER — ONDANSETRON 2 MG/ML
4 INJECTION INTRAMUSCULAR; INTRAVENOUS EVERY 6 HOURS PRN
Status: DISCONTINUED | OUTPATIENT
Start: 2023-10-14 | End: 2023-10-23 | Stop reason: HOSPADM

## 2023-10-14 RX ORDER — ONDANSETRON 4 MG/1
4 TABLET, ORALLY DISINTEGRATING ORAL EVERY 6 HOURS PRN
Status: DISCONTINUED | OUTPATIENT
Start: 2023-10-14 | End: 2023-10-23 | Stop reason: HOSPADM

## 2023-10-14 RX ADMIN — APIXABAN 5 MG: 5 TABLET, FILM COATED ORAL at 21:28

## 2023-10-14 RX ADMIN — HYDROMORPHONE HYDROCHLORIDE 0.2 MG: 0.2 INJECTION, SOLUTION INTRAMUSCULAR; INTRAVENOUS; SUBCUTANEOUS at 09:17

## 2023-10-14 RX ADMIN — SEVELAMER CARBONATE 800 MG: 800 TABLET, FILM COATED ORAL at 19:44

## 2023-10-14 RX ADMIN — LACOSAMIDE 100 MG: 50 TABLET, FILM COATED ORAL at 19:44

## 2023-10-14 RX ADMIN — MIDODRINE HYDROCHLORIDE 10 MG: 5 TABLET ORAL at 17:10

## 2023-10-14 RX ADMIN — AMIODARONE HYDROCHLORIDE 200 MG: 200 TABLET ORAL at 21:28

## 2023-10-14 RX ADMIN — LACOSAMIDE 50 MG: 50 TABLET, FILM COATED ORAL at 09:08

## 2023-10-14 RX ADMIN — PANTOPRAZOLE SODIUM 40 MG: 40 TABLET, DELAYED RELEASE ORAL at 06:34

## 2023-10-14 RX ADMIN — ALBUMIN HUMAN 25 G: 0.25 SOLUTION INTRAVENOUS at 16:03

## 2023-10-14 RX ADMIN — HYDROMORPHONE HYDROCHLORIDE 0.2 MG: 0.2 INJECTION, SOLUTION INTRAMUSCULAR; INTRAVENOUS; SUBCUTANEOUS at 19:50

## 2023-10-14 RX ADMIN — SEVELAMER CARBONATE 800 MG: 800 TABLET, FILM COATED ORAL at 17:10

## 2023-10-14 RX ADMIN — OXYCODONE HYDROCHLORIDE AND ACETAMINOPHEN 1 TABLET: 7.5; 325 TABLET ORAL at 16:03

## 2023-10-14 RX ADMIN — ACETAMINOPHEN 650 MG: 325 TABLET, FILM COATED ORAL at 00:26

## 2023-10-14 RX ADMIN — GABAPENTIN 300 MG: 300 CAPSULE ORAL at 21:28

## 2023-10-14 RX ADMIN — HYDROXYZINE HYDROCHLORIDE 25 MG: 25 TABLET, FILM COATED ORAL at 09:17

## 2023-10-14 RX ADMIN — APIXABAN 5 MG: 5 TABLET, FILM COATED ORAL at 09:09

## 2023-10-14 RX ADMIN — TACROLIMUS 1 MG: 5 CAPSULE ORAL at 12:34

## 2023-10-14 RX ADMIN — Medication 1 CAPSULE: at 09:09

## 2023-10-14 RX ADMIN — TACROLIMUS 0.5 MG: 5 CAPSULE ORAL at 17:10

## 2023-10-14 RX ADMIN — GUAIFENESIN 600 MG: 600 TABLET, EXTENDED RELEASE ORAL at 21:28

## 2023-10-14 RX ADMIN — GUAIFENESIN 600 MG: 600 TABLET, EXTENDED RELEASE ORAL at 09:09

## 2023-10-14 RX ADMIN — SEVELAMER CARBONATE 800 MG: 800 TABLET, FILM COATED ORAL at 12:34

## 2023-10-14 RX ADMIN — OXYCODONE HYDROCHLORIDE AND ACETAMINOPHEN 1 TABLET: 7.5; 325 TABLET ORAL at 05:34

## 2023-10-14 RX ADMIN — MIDODRINE HYDROCHLORIDE 15 MG: 5 TABLET ORAL at 09:09

## 2023-10-14 RX ADMIN — MIDODRINE HYDROCHLORIDE 15 MG: 5 TABLET ORAL at 12:33

## 2023-10-14 RX ADMIN — AMIODARONE HYDROCHLORIDE 200 MG: 200 TABLET ORAL at 09:09

## 2023-10-14 RX ADMIN — SEVELAMER CARBONATE 800 MG: 800 TABLET, FILM COATED ORAL at 07:30

## 2023-10-14 RX ADMIN — MELATONIN TAB 3 MG 3 MG: 3 TAB at 21:28

## 2023-10-14 ASSESSMENT — ACTIVITIES OF DAILY LIVING (ADL)
ADLS_ACUITY_SCORE: 25

## 2023-10-14 NOTE — PLAN OF CARE
7971 - 1809    Orientation: A&Ox4, forgetful at times  Activity: SBA w/GB+W  Diet/BS Checks: Low fat, 2g Na  Tele: NSR  IV Access/Drains: R PIV SL, R CVC, L fistula  Pain Management: PRN tylenol x1 and percocet x1 for 7/10 L arm pain - effective  Abnormal VS/Results: VSS on RA ex soft BP  Bowel/Bladder: Continent, no BM overnight  Skin/Wounds: Bruise to L arm, L groin site - CDI  Consults: Nutrition, ID, GI, Neph, Cardio,   D/C Disposition: Pending improvement  Other Info:

## 2023-10-14 NOTE — PLAN OF CARE
Shift note: 0700 - 1530  Orientation: A+Ox4  Activity: SBA.  Diet/BS Checks: Low fat, 2g Na limit.  Tele:  Normal Sinus  IV Access/Drains: R PIV SL, R CVC, L Fistula  Pain Management: Percocet x1, Dilaudid x1  Abnormal VS/Results: AVSS on RA  Bowel/Bladder: Cont of bowel and bladder  Skin/Wounds: Bruising on L arm, L groin site, L Jugular site  Consults: SW  D/C Disposition: pending improvement, home with wife  Other Info:

## 2023-10-14 NOTE — PROGRESS NOTES
[facility]    Medicine Progress Note - Hospitalist Service    Date of Admission:  10/5/2023    Assessment & Plan     Blanco Osborne is a 58 year old male with extensive medical history that includes liver transplant in 2016 due to alcoholic liver disease, now with progressive cirrhosis and ascites, paroxysmal atrial fibrillation, on anticoagulation with Eliquis, diabetes mellitus type 2, previous CVA, hypertension, CHRISTIAN on BiPAP, chronic pericardial effusion, ESRD on hemodialysis who presented on 10/5/2023 with palpitations and chest discomfort.       He was hypotensive and had wide-complex tachycardia felt to be atrial fibrillation with aberrancy.  He failed 2 attempts of emergent cardioversion.  He was started on amiodarone and subsequently converted to sinus rhythm. He is now on po amiodarone     He received IV fluids but remained hypotensive and subsequently received pressors that were discontinued on 10/7. He had severe sepsis likely due to SBP and pneumonia. Blood and ascitic fluid cultures were negative. He was intially on vancomycin and zosyn. Vancomycin was discontinued on 10/8.     Troponins were elevated, felt to be demand ischemia.  Echo showed pericardial effusion without tamponade.  CT C/A/P showed PE and pulmonary infiltrates.  He was started on IV heparin.      Acute hypoxic respiratory failure-Resolved - multifactorial  -Clinically patient is on room air since yesterday night and was a combination of likely pneumonia versus fluid overload       Severe sepsis with septic shock - due to multifactorial pneumonia -resolved  Chronic hypotension, on midodrine  -Is normally on midodrine for chronic hypotension    -Initially presented with septic shock which has now resolved.  Off pressors since 10/7/2023.  Blood pressure currently in normal range  -Continue midodrine for chronic hypotension and of note patient on nondialysis takes it on  as needed basis and after he was  transferred from ICU he has been on scheduled 15 three times daily and in spite of that he does get hypotensive at times  -Note patient has been afebrile since he has been in the hospital, has completed 8 days of antibiotics, WBC count continues to be stable, no SBP on the ascitic fluid but in spite of that he continues to have lower blood pressure and he is on very high dose of midodrine as started by the ICU team  -We will give him 25 g of albumin, will check random cortisol and continue to monitor his counts  -Infectious disease team was consulted yesterday and they wanted to see if patient can be transferred to [facility] currently they do not have beds and I did talk to transplant ID team as below      Infectious disease   -Blood cultures on 10/6/2023 have been negative  -C. difficile has been negative during this hospital stay  -Ascitic tap was done on 10/6 and aerobic, anaerobic have been negative and fungal or yeast have been negative so far and is predominantly lymphocytic and triglycerides are high and negative for malignancy  -Ascites tap on 10/10 the aerobic cultures are negative and is predominantly lymphocytic  -Pericardial tap on 10/11-negative for malignancy, Gram stain is negative, aerobic bacterial culture is negative  -Ascitic tap on 10/12-predominantly lymphocytic and triglycerides, flow cytometry shows polytypic B cells, aerobic cultures have not shown any growth  -He was checked for AFB in the ascitic fluid in January 2023  -I have discussed his case in detail with the infectious disease with Dr. Moreira at [facility] transplant ID team    -Will check for repeat HIV  -Will order respiratory viral panel  -Strongyloides antibody ordered  -Will check for exotic travel, pets and exposure to tick  -We will also order CMV and Trena-Barr virus in the blood  -In future if patient gets a pericardial tap then sent for 16 S, 28 S, AFB and fungal cultures  -Repeat paracentesis we  will order for AFB fungal and bacterial likely Monday         End-stage liver disease, s/p liver transplant in 2016, now with recurrent ascites and Portal hypertension likely due to cirrhosis(status biopsy on 12/22 which showed poorly preserved liver with evidence of cirrhosis  s/p paracentesis on 10/6 and 10/11  Chylous ascites/ effusion  -On presentation on admission patient was found to have effusion and paracentesis was done and the there were 1119 nucleated cells and triglycerides were elevated at more than 400 and it was chylous effusion and patient has been treated with IV Zosyn for presumed SBP  - his differential count from 10/6 shows predominantly lymphocytes(77 per cent) and ANC is only 11.2 and he does not have SBP and his cultures have been negative including aerobic, anaerobic and fungal  -Status repeat paracentesis on 10/10 and 4.6 L of fluid was removed and triglycerides are elevated at 503, chylous effusion, local cultures again are negative and cell count  showed 761 nucleated cells with 73% lymphocytes, 22% monocytes and ANC of 26.6  -GI team from [place] has been following the patient and they recommended right heart cath his concern was that his ascites started at the time when he had cardiopulmonary symptoms and If his pressures are not elevated then future test may include transjugular liver biopsy and or hepatic vein wedge pressure measurement and that decision will be up to the transplant team of Dr. Simms and they are in touch with him  -Status right heart cath on 10/11 which shows normal right and left-sided filling pressures, mild elevated pulmonary pressures and portal hypertension with hepatic vein pressure gradient of 10 mmHg  -Most likely cause of portal hypertension is cirrhosis as evident on biopsy done on 12/22  - 10/12-His tacrolimus levels were 8( 3-5 is desired level) discussed with the hepatology/GI team and they have held the tacrolimus levels with plan to repeat levels and  he had repeat paracentesis done again and cell count, flow cytometry and non-GYN cytology pending  -10/13-Tacrolimus levels are still pending and we will continue to hold discussed with the GI team and they will evaluate and follow levels and restart when deemed appropriate  -I also discussed with the GI team and we will check QuantiFERON gold and his diet was changed to very low-fat, nutrition consult and follow the culture data as above and they have recommended on neck step to repeat acetic fluid studies and based on the studies further management.  I also consulted infectious disease to have their opinion about lymphocytosis  -utrition team to evaluate calorie count and educate patient on minimal fat diet with assisting increased calories  -10/14-d/w with Dr. Leventhal from transplant hepatology and he did start the patient back on tacrolimus at 1 mg and 0.5 mg and repeat levels to be done on Monday or Tuesday and I did discuss albumin and he generally prefers 25% given that volume is less       Multifocal pneumonia, organism unspecified  - CT scan showed pulmonary infiltrates   - COVID 19/influenza/RSV negative  - blood cultures negative till date from 10/5  - unable to provide sputum specimen  -vancomycin was discontinued on 10/8  -Patient received total of 7 to 8 days of IV Zosyn and then another day Augmentin and it was discontinued with last dose on 10/13    Diarrhea-improved  -C. difficile has been negative on 10/10     Pulmonary embolism in segmental and subsegmental pulmonary arteries of left upper lobe, without cor pulmonale -  Short segment  thrombophlebitis in right cephalic vein at antecubital fossa, likely related to prior intravenous access  -Eliquis held on admission and was started on IV heparin  -Continue with Eliquis which was restarted on     Recurrent chronic pericardial effusion, s/p pericardiocentesis on 9/29.  Drain was removed on 9/30.   Status repeat pericardiocentesis on 10/11  - [place]  to be due to volume overload due to renal failure   -  TTE 10/7 showed moderate pericardial effusion, slightly larger than seen on 10/5, with no evidence of tamponade physiology  - echo on 10/9 showed moderate to large bloody cardial effusion and as compared to 10/7 it has increased, IVC is mildly dilated, mild RV diastolic collapse and has inflow velocity variation consistent with hemodynamic compromise  -Status repeat pericardiocentesis with removal of 560 mL of fluid and it was bloody, red and there were 2558 nucleated cells, no organisms, 2 WBCs, glucose of 79, total protein of 4.9 and albumin level of 2.7 and ADA is pending along with cytology and he had post procedure repeat echo which showed excellent results after removal of fluid  -Drain was removed on 10/12  -Repeat echo as per cardiology in the next 1 to 2 weeks  -If patient has repeat pericardiocentesis in future then sent for AFB/fungal culture, 16 S and 28 S      Recurrent chest discomfort  -He has been having on and off chest discomfort and initially during the course of hospitalization his troponin did peak at 346 on 10/5 and it was thought to be due to demand because of shock and EKG did not had any ST elevation and patient had rapid response yesterday and repeat troponin yesterday showed troponins have trended down to 307  -Echo as above  - discussed with cardiology and as per them his chest discomfort was likely due to pericardial effusion  -In case in future he has recurrent chest discomfort he will need ischemic work-up  - he again complained to left sided chest discomfort and it is same which he had in the past and is present on palpation and no sob  - we will check another EKG , see troponin trend as compared to past level and monitor on tele        Chronic paroxysmal atrial fibrillation, on anticoagulation with Eliquis  Wide-complex tachycardia on admission felt to be atrial fibrillation with aberrant conduction, failed cardioversion x  2  -Patient was started on amiodarone and converted to normal sinus rhythm and he was initially on heparin and transition to amiodarone     ESRD, on hemodialysis q. Monday, Wednesday, Friday  S/p left AV fistula pseudoaneurysm repair, 8/2023  His AV fistula on the left upper extremity infiltrated on 10/6-  - continue dialysis per nephrology and continue with renal multivitamin and sevelamer        Small bilateral pleural effusions  Chronically loculated right pleural effusion  - stable         Generalized anxiety disorder  Restless leg syndrome  -On hydroxyzine and ropinirole        Chronic anemia-due to anemia of chronic disease  -Hemoglobin is baseline between 8-9  -Hemoglobin is stable today at 7.9 on 10/13  -We will check labs intermittently     chronic thrombocytopenia  -Platelet count this morning is 145    Seizure disorder  - continue Vimpat     GERD  -Continue PPI     Probable CHRISTIAN  --Per patient's wife, he had a sleep study about 10 years ago but does not have any CPAP or BiPAP at home  -Did use BiPAP last night  -Need to  undergo sleep study as outpatient after discharge     Previous embolic strokes     Generalized weakness  -Continue with physical therapy and Occupational Therapy.     Pain at multiple locations  -Has pain in left upper extremity due to infiltration of AV fistula, pain in right upper extremity due to thrombophlebitis and frequent lab draws.  Also complains of diffuse pain in shoulders and upper back  -continue Tylenol as needed  -IV Dilaudid 0.2 mg every 6 hours as needed and Percocet every 8 hours as needed             Diet: Snacks/Supplements Adult: Gelatein Plus; Between Meals  Calorie Counts  Room Service  Combination Diet 2 gm NA Diet; Very Low Fat Diet (up to 10g)  Snacks/Supplements Adult: Other; pt may order diet-appropriate supplements (gelatein or ensure clear) PRN; With Meals    DVT Prophylaxis: Heparin drip  Nixon Catheter: Not present  Lines: PRESENT      Hemodialysis Vascular  Access Right Subclavian-Site Assessment: WDL  Hemodialysis Vascular Access Arteriovenous fistula Left Forearm-Site Assessment: Bruit present;Thrill present      Cardiac Monitoring: ACTIVE order. Indication: Tachyarrhythmias, acute (48 hours)  Code Status: Full Code      Clinically Significant Risk Factors         # Hyponatremia: Lowest Na = 127 mmol/L in last 2 days, will monitor as appropriate      # Hypoalbuminemia: Lowest albumin = 2.9 g/dL at 10/14/2023 10:42 AM, will monitor as appropriate         # Hypertension: Noted on problem list        # Overweight: Estimated body mass index is 25.89 kg/m  as calculated from the following:    Height as of this encounter: 1.829 m (6').    Weight as of this encounter: 86.6 kg (190 lb 14.4 oz).     # Moderate Malnutrition: based on nutrition assessment           Disposition Plan      Expected Discharge Date: 10/15/2023      Destination: home with family;inpatient rehabilitation facility  Discharge Comments: 10-11 angio and pericardiocentesis and HD          Deloris Hamilton MD  Hospitalist Service  [facility]  Securely message with [employer] (more info)  Text page via [employer] Paging/Directory   _    Patient is critically ill and his prognosis is guarded  _____________________________________________________________________    Interval History     I saw him couple of times during the course of the day and discussed plan to talk to transplant ID and hepatology.  He denies any lightheadedness, dizziness.  He denies any chest pain or shortness of breath.  His wife was present and discussed plan of care and questions were answered to satisfaction.  I also met them again later in the day and plan of care was discussed and recommendations conveyed    Physical Exam     Temp: 98.9  F (37.2  C) Temp src: Oral BP: 97/59 Pulse: 76   Resp: 16 SpO2: 97 % O2 Device: None (Room air)           General: Patient appears comfortable and in no acute distress.  Respiratory:  Lungs are clear to auscultation bilaterally with no wheeze or crackles and has right-sided permacath  Cardiovascular: Regular rate , S1 and S2 normal with no murmer or rubs or gallops  Abdomen:   soft , non tender , mild distended distended and bowel sound present   Skin: No skin rashes   Neurologic:  No facial droop  Musculoskeletal: Normal Range of motion over upper and lower extremities bilaterally   Psychiatric: cooperative          Medical Decision Making       Time spent in care of patient is 75 minutes and I discussed plan of care with the patient, nurse , discussed with Dr. Leventhal from transplant hepatology and discussed with  from transplant infectious disease team     Data     I have personally reviewed the following data over the past 24 hrs:    8.4  \   7.9 (L)   / 145 (L)     130 (L) 93 (L) 35.3 (H) /  146 (H)   4.4 23 4.83 (H) \     ALT: 7 AST: 12 AP: 142 (H) TBILI: 0.5   ALB: 2.9 (L) TOT PROTEIN: 6.1 (L) LIPASE: N/A     INR:  N/A PTT:  47 (H)   D-dimer:  N/A Fibrinogen:  N/A       Imaging results reviewed over the past 24 hrs:   No results found for this or any previous visit (from the past 24 hour(s)).

## 2023-10-14 NOTE — PLAN OF CARE
10/13/-8852    Summary:  Presented to the ER on 10/05 with palpations, hypotension  and chest discomfort. Found to have acute hypoxic respiratory failure and severe sepsis with septic shock    Primary Diagnosis: Severe sepsis with septic shock - due to multifactorial pneumonia, hypotension    Hx:ESRD, on hemodialysis q. Monday, Wednesday, Friday, post liver transplant in 2016, pericardial effusion, anxiety, RLS, seizure disorder, CHRISTIAN, stroke, A-fib, pleural effusion    Orientation: A+Ox4, forgetful at times    Aggression Stop Light: Green    Mobility: SBA     Pain Management:  PRN Percocet (q8h)     Diet: 2 g sodium, very low fat diet. Calorie counts initiated. Supplements given between meals.    Bowel/Bladder: Continent of B/B. Minimal UOP (baseline).On HD    Abnormal Lab/Assessments: Mild hypotension, VSS on room air. Na-127, Hgb-8.3, Creatinine-6.37, PTT-64    Drain/Device/Wound: L dialysis fistula (ONEIL), R chest CVC, L groin & neck insertion sites (CDI). R PIV    Consults: ID, cardio, PT,OT, Nephro    D/C Day/Goals/Place: TBD-to home    Shift Note: VSS on RA, has pain at L hand, managed with PRN Percocet. SBA, walked along the hallway with family. Abdomen is distended. HD on M/W/F. Hep infusion discontinued at 2100, started on Eliquis. No BP, lab on left arm. Tacrolimus level and PTT to recheck at AM.

## 2023-10-14 NOTE — PROGRESS NOTES
CALORIE COUNT      Approximate Oral Intake for:  10/13/23    Calories:  1734 kcal   Protein:  126 grams        Estimated Needs:    Dosing Weight 87 kg   Estimated Energy Needs: 8434-2098+ kcals (25-30+ Kcal/Kg)  Justification: maintenance, on dialysis   Estimated Protein Needs: 104-157 grams protein (1.2-1.8 g pro/Kg)  Justification: repletion    Martha Sandoval RD, LD, CNSC   Clinical Dietitian - [facility]

## 2023-10-14 NOTE — PROGRESS NOTES
Infectious Disease [facility] Emergency Department Note  I was called by {provider name Dr. Hamilton on 10/14/2023 at 3:16 PM to provide input for Blanco Osborne MRN [id number]. The patient is located [facility]. The nature [facility] does not permit me to perform a comprehensive review of health care records, patient/family interview, nor an examination of the patient. I obtained limited patient information from the provider on the phone call and a limited chart review.    Based on only the information I was provided today, I make the following recommendations to the treating provider/team for their review and consideration: The pt with  s secondary to NAFLD s/p liver transplant  in 2016 who has been admitted for progressive cirrhosis  with evidence of portal hypertension. He has been having recurrent pericardial effusions and ascites (negayive for SBP) and was tapped a few times. Given his transplant history TID was curb sided. His pericardial effusion and ascites is likely from his cirrhosis as he has no fevers or leukocytosis. However, will recommend obtaining hx on his travel (to r/o TB), sexual history (for HIV) and pets (for toxo) as risk factors for infectious pericardial effusions. Pericardial fluid cultures were sent for only bacterial and are negative to date. Abd fluid was sent for fungal and bacterial and was negative in the past. Cytology was negative for malignancy.   At this time I would recommend the pt get HIV screen, EBV and CMV blood, strongy ab and a full resp viral panel. In addition, if he were to get a repeat pericardial tap, please send for fungal, bacterial and AFB stain/cutlure and also consider sending it out for 16s/28s as well for esoteric organisms. The pt was was on zosyn and now off.     These recommendations are not intended to take the place of the care team's clinical judgement, which should always be utilized to provide the most appropriate care to meet the unique  needs of each patient. The recommendations offered were based on the limited scope of information provided as today's date. Should additional guidance be needed or required a formal consultation with infectious diseases is recommended.    *Primary team: If a patient is discharged on IV antibiotics it is not the responsibility of the ID curbside provider to monitor labs or sign for antibiotic orders unless it is otherwise written by the curbside provider. If further outpatient management is required then place an outpatient ID consult and call [phone] to facilitate making an ID appointment before discharge.

## 2023-10-14 NOTE — PROGRESS NOTES
Care Management Follow Up    Length of Stay (days): 9    Expected Discharge Date: 10/15/2023     Concerns to be Addressed: discharge planning  Continues cardiac workup  Patient plan of care discussed at interdisciplinary rounds: Yes    Anticipated Discharge Disposition: Home, Home Care, Acute Rehab     Anticipated Discharge Services: Other (see comment) (dialysis)  Anticipated Discharge DME: Other (see comment) (?bipap if qualifies)    Patient/family educated on Medicare website which has current facility and service quality ratings:    Education Provided on the Discharge Plan:    Patient/Family in Agreement with the Plan: unable to assess    Referrals Placed by CM/SW:    Private pay costs discussed: Not applicable    Additional Information:  Writer spoke with patient's spouse Ofe regarding recommendation for home therapy and Ofe stated that patient is agreeable.  Referral for Home PT/OT made.     Livia Meade RN Care Coordinator  [facility]  [phone]

## 2023-10-15 ENCOUNTER — APPOINTMENT (OUTPATIENT)
Dept: OCCUPATIONAL THERAPY | Facility: CLINIC | Age: 58
DRG: 871 | End: 2023-10-15
Payer: COMMERCIAL

## 2023-10-15 LAB
ALBUMIN SERPL BCG-MCNC: 3 G/DL (ref 3.5–5.2)
ALP SERPL-CCNC: 164 U/L (ref 40–129)
ALT SERPL W P-5'-P-CCNC: 6 U/L (ref 0–70)
ANION GAP SERPL CALCULATED.3IONS-SCNC: 15 MMOL/L (ref 7–15)
AST SERPL W P-5'-P-CCNC: 12 U/L (ref 0–45)
BACTERIA FLD CULT: NO GROWTH
BILIRUB SERPL-MCNC: 0.6 MG/DL
BUN SERPL-MCNC: 52.2 MG/DL (ref 8–23)
CALCIUM SERPL-MCNC: 9.1 MG/DL (ref 8.6–10)
CHLORIDE SERPL-SCNC: 91 MMOL/L (ref 98–107)
CMV DNA SPEC NAA+PROBE-ACNC: NOT DETECTED IU/ML
CREAT SERPL-MCNC: 6.38 MG/DL (ref 0.67–1.17)
DEPRECATED HCO3 PLAS-SCNC: 23 MMOL/L (ref 22–29)
EGFRCR SERPLBLD CKD-EPI 2021: 9 ML/MIN/1.73M2
GLUCOSE SERPL-MCNC: 106 MG/DL (ref 70–99)
POTASSIUM SERPL-SCNC: 4.7 MMOL/L (ref 3.4–5.3)
PROT SERPL-MCNC: 6.4 G/DL (ref 6.4–8.3)
SODIUM SERPL-SCNC: 129 MMOL/L (ref 135–145)
TACROLIMUS BLD-MCNC: 4.2 UG/L (ref 5–15)
TACROLIMUS BLD-MCNC: 4.2 UG/L (ref 5–15)
TME LAST DOSE: ABNORMAL H
TROPONIN T SERPL HS-MCNC: 315 NG/L

## 2023-10-15 PROCEDURE — 250N000011 HC RX IP 250 OP 636: Mod: JZ | Performed by: HOSPITALIST

## 2023-10-15 PROCEDURE — 99232 SBSQ HOSP IP/OBS MODERATE 35: CPT | Performed by: HOSPITALIST

## 2023-10-15 PROCEDURE — 84484 ASSAY OF TROPONIN QUANT: CPT | Performed by: HOSPITALIST

## 2023-10-15 PROCEDURE — 250N000013 HC RX MED GY IP 250 OP 250 PS 637: Performed by: HOSPITALIST

## 2023-10-15 PROCEDURE — 250N000009 HC RX 250: Performed by: INTERNAL MEDICINE

## 2023-10-15 PROCEDURE — 120N000001 HC R&B MED SURG/OB

## 2023-10-15 PROCEDURE — 97530 THERAPEUTIC ACTIVITIES: CPT | Mod: GO

## 2023-10-15 PROCEDURE — 80053 COMPREHEN METABOLIC PANEL: CPT | Performed by: HOSPITALIST

## 2023-10-15 PROCEDURE — 250N000012 HC RX MED GY IP 250 OP 636 PS 637: Performed by: INTERNAL MEDICINE

## 2023-10-15 PROCEDURE — 36415 COLL VENOUS BLD VENIPUNCTURE: CPT | Performed by: HOSPITALIST

## 2023-10-15 PROCEDURE — 80197 ASSAY OF TACROLIMUS: CPT | Performed by: DIETITIAN, REGISTERED

## 2023-10-15 RX ORDER — HYDROMORPHONE HCL IN WATER/PF 6 MG/30 ML
0.2 PATIENT CONTROLLED ANALGESIA SYRINGE INTRAVENOUS EVERY 4 HOURS PRN
Status: DISCONTINUED | OUTPATIENT
Start: 2023-10-15 | End: 2023-10-23 | Stop reason: HOSPADM

## 2023-10-15 RX ADMIN — OXYCODONE HYDROCHLORIDE AND ACETAMINOPHEN 1 TABLET: 7.5; 325 TABLET ORAL at 08:36

## 2023-10-15 RX ADMIN — GABAPENTIN 300 MG: 300 CAPSULE ORAL at 21:11

## 2023-10-15 RX ADMIN — MIDODRINE HYDROCHLORIDE 10 MG: 5 TABLET ORAL at 17:58

## 2023-10-15 RX ADMIN — SEVELAMER CARBONATE 800 MG: 800 TABLET, FILM COATED ORAL at 07:38

## 2023-10-15 RX ADMIN — Medication 1 CAPSULE: at 08:36

## 2023-10-15 RX ADMIN — SEVELAMER CARBONATE 800 MG: 800 TABLET, FILM COATED ORAL at 19:00

## 2023-10-15 RX ADMIN — ACETAMINOPHEN 650 MG: 325 TABLET, FILM COATED ORAL at 15:47

## 2023-10-15 RX ADMIN — TACROLIMUS 1 MG: 5 CAPSULE ORAL at 08:36

## 2023-10-15 RX ADMIN — PANTOPRAZOLE SODIUM 40 MG: 40 TABLET, DELAYED RELEASE ORAL at 06:50

## 2023-10-15 RX ADMIN — LACOSAMIDE 100 MG: 50 TABLET, FILM COATED ORAL at 19:00

## 2023-10-15 RX ADMIN — HYDROMORPHONE HYDROCHLORIDE 0.2 MG: 0.2 INJECTION, SOLUTION INTRAMUSCULAR; INTRAVENOUS; SUBCUTANEOUS at 07:42

## 2023-10-15 RX ADMIN — ACETAMINOPHEN 650 MG: 325 TABLET, FILM COATED ORAL at 23:54

## 2023-10-15 RX ADMIN — AMIODARONE HYDROCHLORIDE 200 MG: 200 TABLET ORAL at 21:11

## 2023-10-15 RX ADMIN — SEVELAMER CARBONATE 800 MG: 800 TABLET, FILM COATED ORAL at 12:08

## 2023-10-15 RX ADMIN — AMIODARONE HYDROCHLORIDE 200 MG: 200 TABLET ORAL at 08:36

## 2023-10-15 RX ADMIN — MIDODRINE HYDROCHLORIDE 10 MG: 5 TABLET ORAL at 12:08

## 2023-10-15 RX ADMIN — OXYCODONE HYDROCHLORIDE AND ACETAMINOPHEN 1 TABLET: 7.5; 325 TABLET ORAL at 00:11

## 2023-10-15 RX ADMIN — TACROLIMUS 0.5 MG: 5 CAPSULE ORAL at 17:58

## 2023-10-15 RX ADMIN — APIXABAN 5 MG: 5 TABLET, FILM COATED ORAL at 21:11

## 2023-10-15 RX ADMIN — APIXABAN 5 MG: 5 TABLET, FILM COATED ORAL at 08:36

## 2023-10-15 RX ADMIN — LACOSAMIDE 50 MG: 50 TABLET, FILM COATED ORAL at 08:35

## 2023-10-15 RX ADMIN — MIDODRINE HYDROCHLORIDE 10 MG: 5 TABLET ORAL at 08:36

## 2023-10-15 RX ADMIN — GUAIFENESIN 600 MG: 600 TABLET, EXTENDED RELEASE ORAL at 21:11

## 2023-10-15 RX ADMIN — MELATONIN TAB 3 MG 3 MG: 3 TAB at 21:11

## 2023-10-15 RX ADMIN — GUAIFENESIN 600 MG: 600 TABLET, EXTENDED RELEASE ORAL at 08:36

## 2023-10-15 RX ADMIN — SEVELAMER CARBONATE 800 MG: 800 TABLET, FILM COATED ORAL at 17:58

## 2023-10-15 ASSESSMENT — ACTIVITIES OF DAILY LIVING (ADL)
ADLS_ACUITY_SCORE: 25
ADLS_ACUITY_SCORE: 23
ADLS_ACUITY_SCORE: 25
ADLS_ACUITY_SCORE: 23
ADLS_ACUITY_SCORE: 25
ADLS_ACUITY_SCORE: 25
ADLS_ACUITY_SCORE: 23
ADLS_ACUITY_SCORE: 23

## 2023-10-15 NOTE — PLAN OF CARE
10/14/-6136     Summary:  Presented to the ER on 10/05 with palpations, hypotension  and chest discomfort. Found to have acute hypoxic respiratory failure and severe sepsis with septic shock     Primary Diagnosis: Severe sepsis with septic shock - due to multifactorial pneumonia, hypotension     Hx:ESRD, on hemodialysis q. Monday, Wednesday, Friday, post liver transplant in 2016, pericardial effusion, anxiety, RLS, seizure disorder, CHRISTIAN, stroke, A-fib, pleural effusion     Orientation: A+Ox4, forgetful at times     Aggression Stop Light: Green     Mobility: SBA      Pain Management:  PRN Percocet (q8h)     Diet: 2 g sodium, very low fat diet. Calorie counts. Supplements given between meals.     Bowel/Bladder: Continent of B/B. Minimal UOP (baseline).On HD     Abnormal Lab/Assessments: Mild hypotension, VSS on room air. Na-130, Hgb-7.9, Creatinine-6.37, PTT-47     Drain/Device/Wound: L dialysis fistula (ONEIL), R chest CVC, L groin & neck insertion sites (CDI). R PIV-SL. Bruises on L hand, R groin and perineum.     Consults: ID, cardio, PT,OT, Nephro     D/C Day/Goals/Place: TBD-to home w/ PT, OT     Shift Note: VSS on RA, has pain at L hand, managed with PRN Percocet. SBA, walked along the hallway with family. Abdomen is distended. HD on M/W/F. No BP, lab on left arm. Pt complaints of chest pain and headache, Dr Hamilton was present at the floor, advised to do EKG and Troponin. Pt was nauseated around 2100, nausea relieved w/o intervention. Hospitalist added PRN Zofran.

## 2023-10-15 NOTE — PLAN OF CARE
2942 - 6344    Orientation: A&Ox4, forgetful   Activity: SBA w/walker  Diet/BS Checks: 2g Na, low fat  Tele: NSR  IV Access/Drains: R PIV SL, R CVC  Pain Management: Percocet x1 for 8/10 L arm pain - effective  Abnormal VS/Results: VSS on RA  Bowel/Bladder: Continent, 1 BM overnight  Skin/Wounds: Bruising to L arm and perineum  Consults: Cardio, PT, OT, SW, ID  D/C Disposition: Back home with home care pending improvement  Other Info: Walked in hallway x2.

## 2023-10-15 NOTE — PROVIDER NOTIFICATION
MD Notification    Notified Person: MD    Notified Person Name:Raymundo Bunch    Notification Date/Time:10/14@2300    Notification Interaction:Matthieu    Purpose of Notification:Trop T 320, was 307 on 10/10. -FYI    Orders Received:None    Comments:

## 2023-10-15 NOTE — PROGRESS NOTES
Chart reviewed. Labs and vitals reviewed.   Plan for HD tomorrow.     Recent Labs   Lab Test 10/15/23  0747 10/14/23  1042   * 130*   POTASSIUM 4.7 4.4   CHLORIDE 91* 93*   CO2 23 23   ANIONGAP 15 14   * 146*   BUN 52.2* 35.3*   CR 6.38* 4.83*   KELLY 9.1 8.8       Maria Fink MD  [facility] - Nephrology   [phone]

## 2023-10-15 NOTE — PROGRESS NOTES
CALORIE COUNT      Approximate Oral Intake for:    10/14/23  Calories:  1914 kcal   Protein:  104 grams     Above reflective of 3 meals and 2 supplements     Estimated Needs:    Dosing Weight 87 kg   Estimated Energy Needs: 7545-0697+ kcals (25-30+ Kcal/Kg)  Justification: maintenance, on dialysis   Estimated Protein Needs: 104-157 grams protein (1.2-1.8 g pro/Kg)  Justification: repletion      Summary:   Three day summary to follow 10/16/23    Martha Sandoval RD, LD, CNSC   Clinical Dietitian - [facility]

## 2023-10-15 NOTE — PROGRESS NOTES
[facility]    Medicine Progress Note - Hospitalist Service    Date of Admission:  10/5/2023    Assessment & Plan     Blanco Osborne is a 58 year old male with extensive medical history that includes liver transplant in 2016 due to alcoholic liver disease, now with progressive cirrhosis and ascites, paroxysmal atrial fibrillation, on anticoagulation with Eliquis, diabetes mellitus type 2, previous CVA, hypertension, CHRISTIAN on BiPAP, chronic pericardial effusion, ESRD on hemodialysis who presented on 10/5/2023 with palpitations and chest discomfort.       He was hypotensive and had wide-complex tachycardia felt to be atrial fibrillation with aberrancy.  He failed 2 attempts of emergent cardioversion.  He was started on amiodarone and subsequently converted to sinus rhythm. He is now on po amiodarone     He received IV fluids but remained hypotensive and subsequently received pressors that were discontinued on 10/7. He had severe sepsis likely due to SBP and pneumonia. Blood and ascitic fluid cultures were negative. He was intially on vancomycin and zosyn. Vancomycin was discontinued on 10/8.     Troponins were elevated, felt to be demand ischemia.  Echo showed pericardial effusion without tamponade.  CT C/A/P showed PE and pulmonary infiltrates.  He was started on IV heparin.      Acute hypoxic respiratory failure-Resolved - multifactorial  -Clinically patient is on room air since yesterday night and was a combination of likely pneumonia versus fluid overload       Severe sepsis with septic shock - due to multifactorial pneumonia -resolved  Chronic hypotension, on midodrine  -Is normally on midodrine for chronic hypotension    -Initially presented with septic shock which has now resolved.  Off pressors since 10/7/2023.  Blood pressure currently in normal range  -Continue midodrine for chronic hypotension and of note patient on nondialysis takes it on  as needed basis and after he was  transferred from ICU he has been on scheduled 15 three times daily and in spite of that he does get hypotensive at times  -Note patient has been afebrile since he has been in the hospital, has completed 8 days of antibiotics, WBC count continues to be stable, no SBP on the ascitic fluid but in spite of that he continues to have lower blood pressure and he is on very high dose of midodrine as started by the ICU team  -Random cortisol level checked on 10/13 have been normal  -Status 25 g of 25% albumin on 10/14  -Dose of midodrine was cut down to 10 mg 3 times daily scheduled on 10/14 evening  -Infectious disease team was consulted yesterday and they wanted to see if patient can be transferred to [facility] currently they do not have beds and I did talk to transplant ID team as below  -We will monitor how his blood pressures are during the course of the day and patient only takes midodrine on as-needed basis and does take it for sure on days of dialysis      Infectious disease   -Blood cultures on 10/6/2023 have been negative  -C. difficile has been negative during this hospital stay  -Ascitic tap was done on 10/6 and aerobic, anaerobic have been negative and fungal or yeast have been negative so far and is predominantly lymphocytic and triglycerides are high and negative for malignancy  -Ascites tap on 10/10 the aerobic cultures are negative and is predominantly lymphocytic  -Pericardial tap on 10/11-negative for malignancy, Gram stain is negative, aerobic bacterial culture is negative  -Ascitic tap on 10/12-predominantly lymphocytic and triglycerides, flow cytometry shows polytypic B cells, aerobic cultures have not shown any growth  -He was checked for AFB in the ascitic fluid in January 2023  -I have discussed his case in detail with the infectious disease with Dr. Moreira at [facility] transplant ID team    - HIV is pending  - respiratory viral panel is negative  -Strongyloides antibody  ordered and pending rest  -No history of pets, exposure to ticks or exotic travel  -CMV is negative  -Trena-Bonilla virus is pending  -In future if patient gets a pericardial tap then sent for 16 S, 28 S, AFB and fungal cultures  -Repeat paracentesis we will order for AFB fungal and bacterial likely Monday         End-stage liver disease, s/p liver transplant in 2016, now with recurrent ascites and Portal hypertension likely due to cirrhosis(status biopsy on 12/22 which showed poorly preserved liver with evidence of cirrhosis  s/p paracentesis on 10/6 and 10/11  Chylous ascites/ effusion  -On presentation on admission patient was found to have effusion and paracentesis was done and the there were 1119 nucleated cells and triglycerides were elevated at more than 400 and it was chylous effusion and patient has been treated with IV Zosyn for presumed SBP  - his differential count from 10/6 shows predominantly lymphocytes(77 per cent) and ANC is only 11.2 and he does not have SBP and his cultures have been negative including aerobic, anaerobic and fungal  -Status repeat paracentesis on 10/10 and 4.6 L of fluid was removed and triglycerides are elevated at 503, chylous effusion, local cultures again are negative and cell count  showed 761 nucleated cells with 73% lymphocytes, 22% monocytes and ANC of 26.6  -GI team from [place] has been following the patient and they recommended right heart cath his concern was that his ascites started at the time when he had cardiopulmonary symptoms and If his pressures are not elevated then future test may include transjugular liver biopsy and or hepatic vein wedge pressure measurement and that decision will be up to the transplant team of Dr. Simms and they are in touch with him  -right heart cath on 10/11 which shows normal right and left-sided filling pressures, mild elevated pulmonary pressures and portal hypertension with hepatic vein pressure gradient of 10 mmHg  -Most likely  cause of portal hypertension is cirrhosis as evident on biopsy done on 12/22  - 10/12-10/12-Tacrolimus levels were high and transplant hepatology has been managing his tacrolimus    -As per GI team note dated 10/13 on next Repeat the sciatic studies including triglyceridesTG (to evaluate if improved post restriction to low fat diet), cell count with diff, cultures, Alb, T.PRO.  -If TG improved on next paracentesis, would continue very low fat (LCT) diet for 2-3 weeks then liberalize fat restriction for LCT challenge and repeat TG on next paracentesis to evaluate if chylous ascites persists.  -If chylous ascites persists despite compliance to low fat diet, consider IR consult for lymphoscintigraphy (possibly with SPECT CT) to locate site of lymphatic leak for embolization of leak.   -No MCT supplementation in the setting of Cirrhosis (high risk for narcosis)   -Calories count  -Nutrition consult      -10/14-d/w with Dr. Leventhal from transplant hepatology and he did start the patient back on tacrolimus at 1 mg and 0.5 mg and repeat levels to be done on Monday or Tuesday and I did discuss albumin and he generally prefers 25% given that volume is less       Multifocal pneumonia, organism unspecified  - CT scan showed pulmonary infiltrates   - COVID 19/influenza/RSV negative  - blood cultures negative till date from 10/5  - unable to provide sputum specimen  -vancomycin was discontinued on 10/8  -Patient received total of 7 to 8 days of IV Zosyn and then another day Augmentin and it was discontinued with last dose on 10/13    Diarrhea-improved  -C. difficile has been negative on 10/10     Pulmonary embolism in segmental and subsegmental pulmonary arteries of left upper lobe, without cor pulmonale -  Short segment  thrombophlebitis in right cephalic vein at antecubital fossa, likely related to prior intravenous access  -Eliquis held on admission and was started on IV heparin  -Continue with Eliquis which was restarted  on     Recurrent chronic pericardial effusion, s/p pericardiocentesis on 9/29.  Drain was removed on 9/30.   Status repeat pericardiocentesis on 10/11  - Felt to be due to volume overload due to renal failure   -  TTE 10/7 showed moderate pericardial effusion, slightly larger than seen on 10/5, with no evidence of tamponade physiology  - echo on 10/9 showed moderate to large bloody cardial effusion and as compared to 10/7 it has increased, IVC is mildly dilated, mild RV diastolic collapse and has inflow velocity variation consistent with hemodynamic compromise  -Status repeat pericardiocentesis with removal of 560 mL of fluid and it was bloody, red and there were 2558 nucleated cells, no organisms, 2 WBCs, glucose of 79, total protein of 4.9 and albumin level of 2.7 and ADA is pending along with cytology and he had post procedure repeat echo which showed excellent results after removal of fluid  -Drain was removed on 10/12  -Repeat echo as per cardiology in the next 1 to 2 weeks  -If patient has repeat pericardiocentesis in future then sent for AFB/fungal culture, 16 S and 28 S      Recurrent chest discomfort  -He has been having on and off chest discomfort and initially during the course of hospitalization his troponin did peak at 346 on 10/5 and it was thought to be due to demand because of shock and EKG did not had any ST elevation and patient had rapid response yesterday and repeat troponin yesterday showed troponins have trended down to 307  -Echo as above  - discussed with cardiology and as per them his chest discomfort was likely due to pericardial effusion  -In case in future he has recurrent chest discomfort he will need ischemic work-up  -Patient again and had episode of chest discomfort on 10/14 and was present on palpation and is similar to prior episodes and troponin was checked and was 320 and a repeat this morning is similar and has trended down and EKG did not show any ST elevation  -Discussed with  the cardiology team and we will have them see him again tomorrow     Chronic paroxysmal atrial fibrillation, on anticoagulation with Eliquis  Wide-complex tachycardia on admission felt to be atrial fibrillation with aberrant conduction, failed cardioversion x 2  -Patient was started on amiodarone and converted to normal sinus rhythm and is currently on oral amiodarone  -Family did wanted to discuss about ongoing use of amiodarone as it has a lot of side effects and can interfere with tacrolimus levels and we will consult cardiology team again for tomorrow morning     ESRD, on hemodialysis q. Monday, Wednesday, Friday  S/p left AV fistula pseudoaneurysm repair, 8/2023  His AV fistula on the left upper extremity infiltrated on 10/6-  - continue dialysis per nephrology and continue with renal multivitamin and sevelamer        Small bilateral pleural effusions  Chronically loculated right pleural effusion  - stable         Generalized anxiety disorder  Restless leg syndrome  -On hydroxyzine and ropinirole        Chronic anemia-due to anemia of chronic disease  -Hemoglobin is baseline between 8-9  -Hemoglobin is stable today at 7.9 on 10/15  -We will check labs intermittently     chronic thrombocytopenia  -Platelet count this morning is 145    Seizure disorder  - continue Vimpat     GERD  -Continue PPI     Probable CHRISTIAN  --Per patient's wife, he had a sleep study about 10 years ago but does not have any CPAP or BiPAP at home  -Did use BiPAP last night  -Need to  undergo sleep study as outpatient after discharge     Previous embolic strokes     Generalized weakness  -Continue with physical therapy and Occupational Therapy.     Pain at multiple locations  -Has pain in left upper extremity due to infiltration of AV fistula, pain in right upper extremity due to thrombophlebitis and frequent lab draws.  Also complains of diffuse pain in shoulders and upper back  -continue Tylenol as needed  -IV Dilaudid 0.2 mg every 6 hours as  needed and Percocet every 8 hours as needed     Plan for 10/16  -Hemodialysis as per nephrology  -Touch base with GI team regarding tacrolimus dose  -       Diet: Snacks/Supplements Adult: Gelatein Plus; Between Meals  Calorie Counts  Room Service  Combination Diet 2 gm NA Diet; Very Low Fat Diet (up to 10g)  Snacks/Supplements Adult: Other; pt may order diet-appropriate supplements (gelatein or ensure clear) PRN; With Meals    DVT Prophylaxis: Heparin drip  Nixon Catheter: Not present  Lines: PRESENT      Hemodialysis Vascular Access Right Subclavian-Site Assessment: WDL  Hemodialysis Vascular Access Arteriovenous fistula Left Forearm-Site Assessment: WDL except;Ecchymotic      Cardiac Monitoring: ACTIVE order. Indication: Tachyarrhythmias, acute (48 hours)  Code Status: Full Code      Clinically Significant Risk Factors         # Hyponatremia: Lowest Na = 129 mmol/L in last 2 days, will monitor as appropriate      # Hypoalbuminemia: Lowest albumin = 2.9 g/dL at 10/14/2023 10:42 AM, will monitor as appropriate         # Hypertension: Noted on problem list        # Overweight: Estimated body mass index is 26.42 kg/m  as calculated from the following:    Height as of this encounter: 1.829 m (6').    Weight as of this encounter: 88.4 kg (194 lb 12.8 oz).     # Moderate Malnutrition: based on nutrition assessment           Disposition Plan     Expected Discharge Date: 10/15/2023      Destination: home with family;inpatient rehabilitation facility  Discharge Comments: 10-11 angio and pericardiocentesis and HD          Deloris Hamilton MD  Hospitalist Service  [facility]  Securely message with [employer] (more info)  Text page via [employer] Paging/Directory   _    Patient is critically ill and his prognosis is guarded     I am off service from tomorrow morning and his care will be taken over by hospital medicine team      _____________________________________________________________________    Interval  History     Seen this morning and later in the day and this morning his brother was present and discussed plan of care with the patient.  He denies any lightheadedness or dizziness.  He had friends visiting him and patient gave permission to talk in front of them and he is clinically feeling much better and denies any lightheadedness.  I did discuss some of the lab results with him    Physical Exam     Temp: 98.5  F (36.9  C) Temp src: Oral BP: 111/62 Pulse: 80   Resp: 16 SpO2: 92 % O2 Device: None (Room air)           General: Patient appears comfortable and in no acute distress.  Respiratory: Lungs are clear to auscultation bilaterally with no wheeze or crackles and has right-sided permacath  Cardiovascular: Regular rate , S1 and S2 normal with no murmer or rubs or gallops  Abdomen:   soft , non tender , mild distended distended and bowel sound present   Skin: No skin rashes   Neurologic:  No facial droop  Musculoskeletal: Normal Range of motion over upper and lower extremities bilaterally   Psychiatric: cooperative          Medical Decision Making       Time spent in care of patient is 46 minutes and I discussed plan of care with the patient, nurse , and discussed plan with the patient's brother and the family     Data     I have personally reviewed the following data over the past 24 hrs:    8.4  \   7.9 (L)   / 145 (L)     129 (L) 91 (L) 52.2 (H) /  106 (H)   4.7 23 6.38 (H) \     ALT: 6 AST: 12 AP: 164 (H) TBILI: 0.6   ALB: 3.0 (L) TOT PROTEIN: 6.4 LIPASE: N/A     Trop: 320 (HH) BNP: N/A     INR:  N/A PTT:  47 (H)   D-dimer:  N/A Fibrinogen:  N/A       Imaging results reviewed over the past 24 hrs:   No results found for this or any previous visit (from the past 24 hour(s)).

## 2023-10-16 ENCOUNTER — APPOINTMENT (OUTPATIENT)
Dept: PHYSICAL THERAPY | Facility: CLINIC | Age: 58
DRG: 871 | End: 2023-10-16
Payer: COMMERCIAL

## 2023-10-16 ENCOUNTER — APPOINTMENT (OUTPATIENT)
Dept: ULTRASOUND IMAGING | Facility: CLINIC | Age: 58
DRG: 871 | End: 2023-10-16
Attending: PHYSICIAN ASSISTANT
Payer: COMMERCIAL

## 2023-10-16 LAB
ABSOLUTE NEUTROPHILS, BODY FLUID: 3190.5 /UL
ALBUMIN BODY FLUID SOURCE: NORMAL
ALBUMIN FLD-MCNC: 1.1 G/DL
ALBUMIN SERPL BCG-MCNC: 3.2 G/DL (ref 3.5–5.2)
ALP SERPL-CCNC: 158 U/L (ref 40–129)
ALT SERPL W P-5'-P-CCNC: 6 U/L (ref 0–70)
ANION GAP SERPL CALCULATED.3IONS-SCNC: 16 MMOL/L (ref 7–15)
APPEARANCE FLD: ABNORMAL
AST SERPL W P-5'-P-CCNC: 13 U/L (ref 0–45)
BACTERIA PCAR CULT: NO GROWTH
BILIRUB SERPL-MCNC: 0.5 MG/DL
BUN SERPL-MCNC: 41.1 MG/DL (ref 8–23)
CALCIUM SERPL-MCNC: 8.6 MG/DL (ref 8.6–10)
CELL COUNT BODY FLUID SOURCE: ABNORMAL
CHLORIDE SERPL-SCNC: 92 MMOL/L (ref 98–107)
COLOR FLD: ABNORMAL
CREAT SERPL-MCNC: 5.09 MG/DL (ref 0.67–1.17)
DEPRECATED HCO3 PLAS-SCNC: 23 MMOL/L (ref 22–29)
EGFRCR SERPLBLD CKD-EPI 2021: 12 ML/MIN/1.73M2
GAMMA INTERFERON BACKGROUND BLD IA-ACNC: 0 IU/ML
GLUCOSE SERPL-MCNC: 123 MG/DL (ref 70–99)
HIV 1+2 AB+HIV1 P24 AG SERPL QL IA: NONREACTIVE
LYMPHOCYTES NFR FLD MANUAL: 7 %
M TB IFN-G BLD-IMP: NEGATIVE
M TB IFN-G CD4+ BCKGRND COR BLD-ACNC: 0.6 IU/ML
MITOGEN IGNF BCKGRD COR BLD-ACNC: 0 IU/ML
MITOGEN IGNF BCKGRD COR BLD-ACNC: 0 IU/ML
MONOS+MACROS NFR FLD MANUAL: 10 %
NEUTS BAND NFR FLD MANUAL: 83 %
PATH REPORT.COMMENTS IMP SPEC: NORMAL
PATH REPORT.FINAL DX SPEC: NORMAL
PATH REPORT.GROSS SPEC: NORMAL
PATH REPORT.MICROSCOPIC SPEC OTHER STN: NORMAL
PATH REPORT.RELEVANT HX SPEC: NORMAL
POTASSIUM SERPL-SCNC: 4.4 MMOL/L (ref 3.4–5.3)
PROT FLD-MCNC: 2.6 G/DL
PROT SERPL-MCNC: 6.3 G/DL (ref 6.4–8.3)
PROTEIN BODY FLUID SOURCE: NORMAL
QUANTIFERON MITOGEN: 0.6 IU/ML
QUANTIFERON NIL TUBE: 0 IU/ML
QUANTIFERON TB1 TUBE: 0 IU/ML
QUANTIFERON TB2 TUBE: 0
SODIUM SERPL-SCNC: 131 MMOL/L (ref 135–145)
WBC # FLD AUTO: 3844 /UL

## 2023-10-16 PROCEDURE — 87102 FUNGUS ISOLATION CULTURE: CPT | Performed by: PHYSICIAN ASSISTANT

## 2023-10-16 PROCEDURE — 99233 SBSQ HOSP IP/OBS HIGH 50: CPT | Performed by: INTERNAL MEDICINE

## 2023-10-16 PROCEDURE — 258N000003 HC RX IP 258 OP 636: Performed by: INTERNAL MEDICINE

## 2023-10-16 PROCEDURE — 250N000013 HC RX MED GY IP 250 OP 250 PS 637: Performed by: HOSPITALIST

## 2023-10-16 PROCEDURE — 84478 ASSAY OF TRIGLYCERIDES: CPT | Performed by: PHYSICIAN ASSISTANT

## 2023-10-16 PROCEDURE — 89050 BODY FLUID CELL COUNT: CPT | Performed by: PHYSICIAN ASSISTANT

## 2023-10-16 PROCEDURE — 120N000001 HC R&B MED SURG/OB

## 2023-10-16 PROCEDURE — 0W9G3ZZ DRAINAGE OF PERITONEAL CAVITY, PERCUTANEOUS APPROACH: ICD-10-PCS | Performed by: STUDENT IN AN ORGANIZED HEALTH CARE EDUCATION/TRAINING PROGRAM

## 2023-10-16 PROCEDURE — 250N000012 HC RX MED GY IP 250 OP 636 PS 637: Performed by: INTERNAL MEDICINE

## 2023-10-16 PROCEDURE — 84157 ASSAY OF PROTEIN OTHER: CPT | Performed by: PHYSICIAN ASSISTANT

## 2023-10-16 PROCEDURE — P9047 ALBUMIN (HUMAN), 25%, 50ML: HCPCS | Performed by: PHYSICIAN ASSISTANT

## 2023-10-16 PROCEDURE — 82042 OTHER SOURCE ALBUMIN QUAN EA: CPT | Performed by: PHYSICIAN ASSISTANT

## 2023-10-16 PROCEDURE — 250N000011 HC RX IP 250 OP 636: Mod: JZ | Performed by: HOSPITALIST

## 2023-10-16 PROCEDURE — 99233 SBSQ HOSP IP/OBS HIGH 50: CPT | Performed by: STUDENT IN AN ORGANIZED HEALTH CARE EDUCATION/TRAINING PROGRAM

## 2023-10-16 PROCEDURE — 80053 COMPREHEN METABOLIC PANEL: CPT | Performed by: HOSPITALIST

## 2023-10-16 PROCEDURE — 87070 CULTURE OTHR SPECIMN AEROBIC: CPT | Performed by: PHYSICIAN ASSISTANT

## 2023-10-16 PROCEDURE — 90937 HEMODIALYSIS REPEATED EVAL: CPT

## 2023-10-16 PROCEDURE — 250N000011 HC RX IP 250 OP 636: Performed by: PHYSICIAN ASSISTANT

## 2023-10-16 PROCEDURE — 88305 TISSUE EXAM BY PATHOLOGIST: CPT | Mod: 26 | Performed by: PATHOLOGY

## 2023-10-16 PROCEDURE — 250N000009 HC RX 250: Performed by: INTERNAL MEDICINE

## 2023-10-16 PROCEDURE — 97750 PHYSICAL PERFORMANCE TEST: CPT | Mod: GP

## 2023-10-16 PROCEDURE — 250N000009 HC RX 250: Performed by: STUDENT IN AN ORGANIZED HEALTH CARE EDUCATION/TRAINING PROGRAM

## 2023-10-16 PROCEDURE — 88112 CYTOPATH CELL ENHANCE TECH: CPT | Mod: 26 | Performed by: PATHOLOGY

## 2023-10-16 PROCEDURE — 80197 ASSAY OF TACROLIMUS: CPT | Performed by: DIETITIAN, REGISTERED

## 2023-10-16 PROCEDURE — 90935 HEMODIALYSIS ONE EVALUATION: CPT | Performed by: INTERNAL MEDICINE

## 2023-10-16 PROCEDURE — 36415 COLL VENOUS BLD VENIPUNCTURE: CPT | Performed by: HOSPITALIST

## 2023-10-16 PROCEDURE — 99232 SBSQ HOSP IP/OBS MODERATE 35: CPT | Performed by: PHYSICIAN ASSISTANT

## 2023-10-16 PROCEDURE — 272N000706 US PARACENTESIS WITH ALBUMIN

## 2023-10-16 PROCEDURE — 87206 SMEAR FLUORESCENT/ACID STAI: CPT | Performed by: PHYSICIAN ASSISTANT

## 2023-10-16 PROCEDURE — 97530 THERAPEUTIC ACTIVITIES: CPT | Mod: GP

## 2023-10-16 RX ORDER — CEFTRIAXONE 2 G/1
2 INJECTION, POWDER, FOR SOLUTION INTRAMUSCULAR; INTRAVENOUS EVERY 24 HOURS
Status: DISCONTINUED | OUTPATIENT
Start: 2023-10-16 | End: 2023-10-17

## 2023-10-16 RX ORDER — LIDOCAINE HYDROCHLORIDE 10 MG/ML
10 INJECTION, SOLUTION EPIDURAL; INFILTRATION; INTRACAUDAL; PERINEURAL ONCE
Status: COMPLETED | OUTPATIENT
Start: 2023-10-16 | End: 2023-10-16

## 2023-10-16 RX ORDER — CEFTRIAXONE 1 G/1
1 INJECTION, POWDER, FOR SOLUTION INTRAMUSCULAR; INTRAVENOUS EVERY 24 HOURS
Status: DISCONTINUED | OUTPATIENT
Start: 2023-10-16 | End: 2023-10-16

## 2023-10-16 RX ORDER — ALBUMIN (HUMAN) 12.5 G/50ML
25 SOLUTION INTRAVENOUS ONCE
Status: COMPLETED | OUTPATIENT
Start: 2023-10-16 | End: 2023-10-16

## 2023-10-16 RX ADMIN — LACOSAMIDE 50 MG: 50 TABLET, FILM COATED ORAL at 12:52

## 2023-10-16 RX ADMIN — Medication 1 CAPSULE: at 12:52

## 2023-10-16 RX ADMIN — CAMPHOR AND MENTHOL: 5; 5 LOTION TOPICAL at 21:17

## 2023-10-16 RX ADMIN — MELATONIN TAB 3 MG 3 MG: 3 TAB at 21:06

## 2023-10-16 RX ADMIN — Medication: at 09:33

## 2023-10-16 RX ADMIN — AMIODARONE HYDROCHLORIDE 200 MG: 200 TABLET ORAL at 12:51

## 2023-10-16 RX ADMIN — MIDODRINE HYDROCHLORIDE 10 MG: 5 TABLET ORAL at 09:26

## 2023-10-16 RX ADMIN — CEFTRIAXONE SODIUM 2 G: 2 INJECTION, POWDER, FOR SOLUTION INTRAMUSCULAR; INTRAVENOUS at 20:59

## 2023-10-16 RX ADMIN — SEVELAMER CARBONATE 800 MG: 800 TABLET, FILM COATED ORAL at 21:00

## 2023-10-16 RX ADMIN — SEVELAMER CARBONATE 800 MG: 800 TABLET, FILM COATED ORAL at 12:51

## 2023-10-16 RX ADMIN — SEVELAMER CARBONATE 800 MG: 800 TABLET, FILM COATED ORAL at 07:44

## 2023-10-16 RX ADMIN — LIDOCAINE HYDROCHLORIDE 10 ML: 10 INJECTION, SOLUTION EPIDURAL; INFILTRATION; INTRACAUDAL; PERINEURAL at 15:04

## 2023-10-16 RX ADMIN — APIXABAN 5 MG: 5 TABLET, FILM COATED ORAL at 21:00

## 2023-10-16 RX ADMIN — ACETAMINOPHEN 650 MG: 325 TABLET, FILM COATED ORAL at 08:10

## 2023-10-16 RX ADMIN — PANTOPRAZOLE SODIUM 40 MG: 40 TABLET, DELAYED RELEASE ORAL at 06:39

## 2023-10-16 RX ADMIN — LACOSAMIDE 100 MG: 50 TABLET, FILM COATED ORAL at 21:00

## 2023-10-16 RX ADMIN — SEVELAMER CARBONATE 800 MG: 800 TABLET, FILM COATED ORAL at 17:40

## 2023-10-16 RX ADMIN — GUAIFENESIN 600 MG: 600 TABLET, EXTENDED RELEASE ORAL at 12:51

## 2023-10-16 RX ADMIN — SODIUM CHLORIDE 250 ML: 9 INJECTION, SOLUTION INTRAVENOUS at 09:31

## 2023-10-16 RX ADMIN — TACROLIMUS 0.5 MG: 5 CAPSULE ORAL at 17:41

## 2023-10-16 RX ADMIN — MIDODRINE HYDROCHLORIDE 10 MG: 5 TABLET ORAL at 17:40

## 2023-10-16 RX ADMIN — ACETAMINOPHEN 650 MG: 325 TABLET, FILM COATED ORAL at 21:09

## 2023-10-16 RX ADMIN — TACROLIMUS 1 MG: 5 CAPSULE ORAL at 12:58

## 2023-10-16 RX ADMIN — ALBUMIN HUMAN 25 G: 0.25 SOLUTION INTRAVENOUS at 17:41

## 2023-10-16 RX ADMIN — MIDODRINE HYDROCHLORIDE 10 MG: 5 TABLET ORAL at 07:50

## 2023-10-16 RX ADMIN — GUAIFENESIN 600 MG: 600 TABLET, EXTENDED RELEASE ORAL at 21:00

## 2023-10-16 RX ADMIN — GABAPENTIN 300 MG: 300 CAPSULE ORAL at 21:06

## 2023-10-16 RX ADMIN — SODIUM CHLORIDE 300 ML: 9 INJECTION, SOLUTION INTRAVENOUS at 09:32

## 2023-10-16 RX ADMIN — APIXABAN 5 MG: 5 TABLET, FILM COATED ORAL at 12:52

## 2023-10-16 ASSESSMENT — ACTIVITIES OF DAILY LIVING (ADL)
ADLS_ACUITY_SCORE: 25
ADLS_ACUITY_SCORE: 29
ADLS_ACUITY_SCORE: 25
ADLS_ACUITY_SCORE: 29

## 2023-10-16 NOTE — PROGRESS NOTES
[facility]    Infectious Disease Progress Note    Date of Service (when I saw the patient): 10/16/2023     Assessment & Plan   Blanco Osborne is a 58 year old who was admitted on 10/5/2023.     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 s/p liver transplant  in 2016 now with progressive cirrhosis  with evidence of portal hypertension   Other PMH include: paroxysmal atrial fibrillation on Eliquis, DM type 2, history of CVA, hypertension, CHRISTIAN on BiPAP, chronic pericardial effusion, ESRD on dialysis '  Presented with chest discomfort and palpitations  Found to have pericardial fluid which was tapped, no positive micro   Peritoneal fluid tapped again no positive micro   Previously asitic fluid has been checked for AFB organism none found   Other possible etiology is chylous ascites   Patient does not have traditional TB risk factors   Immunocompromised with liver transplant      Recommendations :   Noted and appreciate transplant ID curbside recommendations   If repeat paracentesis is being pursued get AFB cultures and stain and fungal cultures          Gurwinder Lanza MD    Interval History   Did not see the patient     Physical Exam   Temp: 98.2  F (36.8  C) Temp src: Oral BP: 105/69 Pulse: 86   Resp: 18 SpO2: 98 % O2 Device: Nasal cannula Oxygen Delivery: 2 LPM  Vitals:    10/15/23 0626 10/15/23 1639 10/16/23 0707   Weight: 88.4 kg (194 lb 12.8 oz) 88.9 kg (195 lb 14.4 oz) 89.2 kg (196 lb 9.6 oz)     Vital Signs with Ranges  Temp:  [98.2  F (36.8  C)-98.6  F (37  C)] 98.2  F (36.8  C)  Pulse:  [75-89] 86  Resp:  [16-20] 18  BP: ()/(57-71) 105/69  SpO2:  [90 %-98 %] 98 %      Medications    - MEDICATION INSTRUCTIONS -        - MEDICATION INSTRUCTIONS for Dialysis Patients -   Does not apply See Admin Instructions    amiodarone  200 mg Oral BID    apixaban ANTICOAGULANT  5 mg Oral BID    gabapentin  300 mg Oral At Bedtime    guaiFENesin  600 mg  Oral BID    lacosamide  100 mg Oral QPM    lacosamide  50 mg Oral QAM    melatonin  3 mg Oral At Bedtime    midodrine  10 mg Oral TID w/meals    multivitamin RENAL  1 capsule Oral Daily    pantoprazole  40 mg Oral QAM AC    sevelamer carbonate  800 mg Oral 4x Daily    tacrolimus  0.5 mg Oral QPM    tacrolimus  1 mg Oral QAM       Data   All microbiology laboratory data reviewed.  Recent Labs   Lab Test 10/14/23  1042 10/13/23  1155 10/12/23  0644   WBC 8.4 6.5 6.4   HGB 7.9* 8.3* 8.0*   HCT 25.2* 25.9* 24.8*    98 99   * 143* 128*     Recent Labs   Lab Test 10/15/23  0747 10/14/23  1042 10/13/23  0850   CR 6.38* 4.83* 6.37*     No lab results found.  Recent Labs   Lab Test 09/28/16  1600 09/23/16  1458 09/20/16  1901   CULT No VRE isolated No growth Culture negative for acid fast bacilli  Assayed at [facility] [address]    Culture negative after 4 weeks  No anaerobes isolated  No growth       All cultures:  Recent Labs   Lab 10/12/23  1113 10/11/23  1355 10/10/23  1313   CULTURE No growth after 3 days No Growth No Growth      Blood culture:  Results for orders placed or performed during the hospital encounter of 10/05/23   Blood Culture Peripheral Blood    Specimen: Peripheral Blood   Result Value Ref Range    Culture No Growth    Blood Culture Peripheral Blood    Specimen: Peripheral Blood   Result Value Ref Range    Culture No Growth    Results for orders placed or performed during the hospital encounter of 01/21/23   Blood Culture Arm, Right    Specimen: Arm, Right; Blood   Result Value Ref Range    Culture No Growth    Blood Culture Peripheral Blood    Specimen: Peripheral Blood   Result Value Ref Range    Culture No Growth    Results for orders placed or performed during the hospital encounter of 12/29/22   Blood Culture Hand, Right    Specimen: Hand, Right; Blood   Result Value Ref Range    Culture No Growth    Blood Culture Hand, Left    Specimen: Hand, Left; Blood    Result Value Ref Range    Culture No Growth    Blood Culture Peripheral Blood    Specimen: Peripheral Blood   Result Value Ref Range    Culture No Growth    Blood Culture Arm, Left    Specimen: Arm, Left; Blood   Result Value Ref Range    Culture No Growth    Results for orders placed or performed during the hospital encounter of 12/16/22   Blood Culture Peripheral Blood    Specimen: Peripheral Blood   Result Value Ref Range    Culture No Growth    Blood Culture Arm, Right    Specimen: Arm, Right; Blood   Result Value Ref Range    Culture No Growth    Results for orders placed or performed during the hospital encounter of 12/16/22   Blood Culture Peripheral Blood    Specimen: Peripheral Blood   Result Value Ref Range    Culture No Growth    Results for orders placed or performed during the hospital encounter of 11/12/22   Blood Culture Line, venous    Specimen: Line, venous; Blood   Result Value Ref Range    Culture No Growth    Blood Culture Hand, Right    Specimen: Hand, Right; Blood   Result Value Ref Range    Culture No Growth    Blood Culture Hand, Right    Specimen: Hand, Right; Blood   Result Value Ref Range    Culture No Growth    Blood Culture Hand, Left    Specimen: Hand, Left; Blood   Result Value Ref Range    Culture No Growth    Blood Culture Peripheral Blood    Specimen: Peripheral Blood   Result Value Ref Range    Culture No Growth    Blood Culture Peripheral Blood    Specimen: Peripheral Blood   Result Value Ref Range    Culture Positive on the 1st day of incubation (A)     Culture Streptococcus pneumoniae (AA)        Susceptibility    Streptococcus pneumoniae - KARAN     Levofloxacin 1.5 Susceptible ug/mL     Meropenem 0.012 Susceptible ug/mL     Vancomycin 0.50 Susceptible ug/mL     Penicillin (meningitis) 0.016 Susceptible ug/mL     Penicillin (non-meningitis) 0.016 Susceptible ug/mL     Penicillin(oral) 0.016 Susceptible ug/mL     Ceftriaxone (non-meningitis) 0.016 Susceptible ug/mL      Ceftriaxone (meningitis) 0.016 Susceptible ug/mL     *Note: Due to a large number of results and/or encounters for the requested time period, some results have not been displayed. A complete set of results can be found in Results Review.      Urine culture:  No results found. However, due to the size of the patient record, not all encounters were searched. Please check Results Review for a complete set of results.

## 2023-10-16 NOTE — PLAN OF CARE
Goal Outcome Evaluation:       10/16/23 [id number]     Summary:  Presented to the ER on 10/05 with palpations, hypotension  and chest discomfort. Found to have acute hypoxic respiratory failure and severe sepsis with septic shock     Primary Diagnosis: Severe sepsis with septic shock - due to multifactorial pneumonia, hypotension     Hx: ESRD, on hemodialysis M, W, F post liver transplant in 2016, pericardial effusion, anxiety, RLS, seizure disorder, CHRISTIAN, stroke, A-fib, pleural effusion     Orientation: A/O x4, forgetful at times     Aggression Stop Light: Green     Mobility: SBA      Pain Management:  denies, PRN Percocet available     Diet: 2 g Na+, very low fat diet. Calorie counts. Supplements given between meals. Room Service.     Bowel/Bladder: Continent of B/B. Minimal UOP (anuria), on hemodialysis     Abnormal Lab/Assessments: Mild hypotension, VSS on RA. K+ 4.7, troponin 315 (demand ischemia, MD aware)     Drain/Device/Wound: L dialysis fistula (ONEIL), R chest CVC, L groin & neck insertion sites (CDI). R PIV-SL. Bruises on L hand, R groin, and perineum.     Consults: ID, cardio, PT,OT, Nephro, SW, GI     D/C Day/Goals/Place: discharge back to home with PT/OT when medical stable     Shift Note: SBA, ambulated in hallway. Abdomen distended. HD completed today.  Abdominal ultrasound and paracentesis completed today, microbiology results pending.  No BP on L arm due to healing fistula site. Small skin tear on L elbow, mepi applied. CMP and Tacrolimus level check 10/17 in the AM.  Tele NSR.

## 2023-10-16 NOTE — PROGRESS NOTES
[facility]    Medicine Progress Note - Hospitalist Service    Date of Admission:  10/5/2023    Assessment & Plan     Blanco Osborne is a 58 year old male with extensive medical history that includes liver transplant in 2016 due to alcoholic liver disease, now with progressive cirrhosis and ascites, paroxysmal atrial fibrillation, on anticoagulation with Eliquis, diabetes mellitus type 2, previous CVA, hypertension, CHRISTIAN on BiPAP, chronic pericardial effusion, ESRD on hemodialysis who presented on 10/5/2023 with palpitations and chest discomfort.       He was hypotensive and had wide-complex tachycardia felt to be atrial fibrillation with aberrancy.  He failed 2 attempts of emergent cardioversion.  He was started on amiodarone and subsequently converted to sinus rhythm. He is now on po amiodarone     He received IV fluids but remained hypotensive and subsequently received pressors that were discontinued on 10/7. He had severe sepsis likely due to SBP and pneumonia. Blood and ascitic fluid cultures were negative. He was intially on vancomycin and zosyn. Vancomycin was discontinued on 10/8.     Troponins were elevated, felt to be demand ischemia.  Echo showed pericardial effusion without tamponade.  CT C/A/P showed PE and pulmonary infiltrates.  He was started on IV heparin.      Acute hypoxic respiratory failure-Resolved - multifactorial  -Clinically patient is on room air since yesterday night and was a combination of likely pneumonia versus fluid overload       Severe sepsis with septic shock - due to multifactorial pneumonia -resolved  Chronic hypotension, on midodrine  -Is normally on midodrine for chronic hypotension    -Initially presented with septic shock which has now resolved.  Off pressors since 10/7/2023.  Blood pressure currently in normal range  -Continue midodrine for chronic hypotension and of note patient on nondialysis takes it on  as needed basis and after he was  transferred from ICU he has been on scheduled 15 three times daily and in spite of that he does get hypotensive at times  -Note patient has been afebrile since he has been in the hospital, has completed 8 days of antibiotics, WBC count continues to be stable, no SBP on the ascitic fluid but in spite of that he continues to have lower blood pressure and he is on very high dose of midodrine as started by the ICU team  -Random cortisol level checked on 10/13 have been normal  -Status 25 g of 25% albumin on 10/14  -Dose of midodrine was cut down to 10 mg 3 times daily scheduled on 10/14 evening  -Infectious disease team was consulted yesterday and they wanted to see if patient can be transferred to [facility] currently they do not have beds and I did talk to transplant ID team as below  -We will monitor how his blood pressures are during the course of the day and patient only takes midodrine on as-needed basis and does take it for sure on days of dialysis    Infectious disease   -Blood cultures on 10/6/2023 have been negative  -C. difficile has been negative during this hospital stay  -Ascitic tap was done on 10/6 and aerobic, anaerobic have been negative and fungal or yeast have been negative so far and is predominantly lymphocytic and triglycerides are high and negative for malignancy  -Ascites tap on 10/10 the aerobic cultures are negative and is predominantly lymphocytic  -Pericardial tap on 10/11-negative for malignancy, Gram stain is negative, aerobic bacterial culture is negative  -Ascitic tap on 10/12-predominantly lymphocytic and triglycerides, flow cytometry shows polytypic B cells, aerobic cultures have not shown any growth  -He was checked for AFB in the ascitic fluid in January 2023  -I have discussed his case in detail with the infectious disease with Dr. Moreira at [facility] transplant ID team    - HIV is pending  - respiratory viral panel is negative  -Strongyloides antibody  ordered and pending rest  -No history of pets, exposure to ticks or exotic travel  -CMV is negative  -Repeat paracentesis we will order for AFB fungal and bacterial today     End-stage liver disease, s/p liver transplant in 2016, now with recurrent ascites and Portal hypertension likely due to cirrhosis(status biopsy on 12/22 which showed poorly preserved liver with evidence of cirrhosis  s/p paracentesis on 10/6 and 10/11  Chylous ascites/ effusion  -On presentation on admission patient was found to have effusion and paracentesis was done and the there were 1119 nucleated cells and triglycerides were elevated at more than 400 and it was chylous effusion and patient has been treated with IV Zosyn for presumed SBP  - his differential count from 10/6 shows predominantly lymphocytes(77 per cent) and ANC is only 11.2 and he does not have SBP and his cultures have been negative including aerobic, anaerobic and fungal  -Status repeat paracentesis on 10/10 and 4.6 L of fluid was removed and triglycerides are elevated at 503, chylous effusion, local cultures again are negative and cell count  showed 761 nucleated cells with 73% lymphocytes, 22% monocytes and ANC of 26.6  -GI team from [place] has been following the patient and they recommended right heart cath his concern was that his ascites started at the time when he had cardiopulmonary symptoms and If his pressures are not elevated then future test may include transjugular liver biopsy and or hepatic vein wedge pressure measurement and that decision will be up to the transplant team of Dr. Simms and they are in touch with him  -right heart cath on 10/11 which shows normal right and left-sided filling pressures, mild elevated pulmonary pressures and portal hypertension with hepatic vein pressure gradient of 10 mmHg  -Most likely cause of portal hypertension is cirrhosis as evident on biopsy done on 12/22  - 10/12-10/12-Tacrolimus levels were high and transplant  hepatology has been managing his tacrolimus    -As per GI team note dated 10/13 on next Repeat the sciatic studies including triglyceridesTG (to evaluate if improved post restriction to low fat diet), cell count with diff, cultures, Alb, T.PRO.  -If TG improved on next paracentesis, would continue very low fat (LCT) diet for 2-3 weeks then liberalize fat restriction for LCT challenge and repeat TG on next paracentesis to evaluate if chylous ascites persists.  -If chylous ascites persists despite compliance to low fat diet, consider IR consult for lymphoscintigraphy (possibly with SPECT CT) to locate site of lymphatic leak for embolization of leak.   -No MCT supplementation in the setting of Cirrhosis (high risk for narcosis)   -Calories count  -Nutrition consulted  -10/14-d/w with Dr. Leventhal from transplant hepatology and he did start the patient back on tacrolimus at 1 mg and 0.5 mg and repeat levels to be done on Monday or Tuesday and I did discuss albumin and he generally prefers 25% given that volume is less  -10/16 -> repeat paracentesis with ascites studies: triglcyerides, AFB culture and stain, fungal cultures, cell count with diff, cultures, albumin and total protein. Give 25g IV albumin given symptomatic hypotension following HD and paracentesis         Multifocal pneumonia, organism unspecified  - CT scan showed pulmonary infiltrates   - COVID 19/influenza/RSV negative  - blood cultures negative till date from 10/5  - unable to provide sputum specimen  -vancomycin was discontinued on 10/8  -Patient received total of 7 to 8 days of IV Zosyn and then another day Augmentin and it was discontinued with last dose on 10/13    Diarrhea-improved  -C. difficile has been negative on 10/10     Pulmonary embolism in segmental and subsegmental pulmonary arteries of left upper lobe, without cor pulmonale -  Short segment  thrombophlebitis in right cephalic vein at antecubital fossa, likely related to prior  intravenous access  -Eliquis held on admission and was started on IV heparin  -Continue with Eliquis which was restarted on     Recurrent chronic pericardial effusion, s/p pericardiocentesis on 9/29.  Drain was removed on 9/30.   Status repeat pericardiocentesis on 10/11  - Felt to be due to volume overload due to renal failure   -  TTE 10/7 showed moderate pericardial effusion, slightly larger than seen on 10/5, with no evidence of tamponade physiology  - echo on 10/9 showed moderate to large bloody cardial effusion and as compared to 10/7 it has increased, IVC is mildly dilated, mild RV diastolic collapse and has inflow velocity variation consistent with hemodynamic compromise  -Status repeat pericardiocentesis with removal of 560 mL of fluid and it was bloody, red and there were 2558 nucleated cells, no organisms, 2 WBCs, glucose of 79, total protein of 4.9 and albumin level of 2.7 and ADA is pending along with cytology and he had post procedure repeat echo which showed excellent results after removal of fluid  -Drain was removed on 10/12  -Repeat echo as per cardiology -> Repeat echocardiogram tomorrow   -If patient has repeat pericardiocentesis in future then sent for AFB/fungal culture, 16 S and 28 S    Recurrent chest discomfort  -He has been having on and off chest discomfort and initially during the course of hospitalization his troponin did peak at 346 on 10/5 and it was thought to be due to demand because of shock and EKG did not had any ST elevation and patient had rapid response yesterday and repeat troponin yesterday showed troponins have trended down to 307  -Echo as above  - discussed with cardiology and as per them his chest discomfort was likely due to pericardial effusion  -In case in future he has recurrent chest discomfort he will need ischemic work-up  -Patient again and had episode of chest discomfort on 10/14 and was present on palpation and is similar to prior episodes and troponin was  checked and was 320 and a repeat this morning is similar and has trended down and EKG did not show any ST elevation  -Discussed with the cardiology team and we will have them see him again tomorrow     Chronic paroxysmal atrial fibrillation, on anticoagulation with Eliquis  Wide-complex tachycardia on admission felt to be atrial fibrillation with aberrant conduction, failed cardioversion x 2  -Patient was started on amiodarone and converted to normal sinus rhythm and is currently on oral amiodarone  -Family did wanted to discuss about ongoing use of amiodarone as it has a lot of side effects and can interfere with tacrolimus levels and we will consult cardiology team again for tomorrow morning     ESRD, on hemodialysis q. Monday, Wednesday, Friday  S/p left AV fistula pseudoaneurysm repair, 8/2023  His AV fistula on the left upper extremity infiltrated on 10/6-  - continue dialysis per nephrology and continue with renal multivitamin and sevelamer        Small bilateral pleural effusions  Chronically loculated right pleural effusion  - stable         Generalized anxiety disorder  Restless leg syndrome  -On hydroxyzine and ropinirole        Chronic anemia-due to anemia of chronic disease  -Hemoglobin is baseline between 8-9  -Hemoglobin is stable today at 7.9 on 10/15  -We will check labs intermittently     chronic thrombocytopenia  -Platelet count this morning is 145    Seizure disorder  - continue Vimpat     GERD  -Continue PPI     Probable CHRISTIAN  --Per patient's wife, he had a sleep study about 10 years ago but does not have any CPAP or BiPAP at home  -Did use BiPAP last night  -Need to  undergo sleep study as outpatient after discharge     Previous embolic strokes     Generalized weakness  -Continue with physical therapy and Occupational Therapy.     Pain at multiple locations  -Has pain in left upper extremity due to infiltration of AV fistula, pain in right upper extremity due to thrombophlebitis and frequent lab  draws.  Also complains of diffuse pain in shoulders and upper back  -continue Tylenol as needed  -IV Dilaudid 0.2 mg every 6 hours as needed and Percocet every 8 hours as needed     Plan for 10/16  -Hemodialysis as per nephrology  -Touch base with GI team regarding tacrolimus dose  -       Diet: Snacks/Supplements Adult: Gelatein Plus; Between Meals  Room Service  Combination Diet 2 gm NA Diet; Very Low Fat Diet (up to 10g)  Snacks/Supplements Adult: Other; pt may order diet-appropriate supplements (gelatein or ensure clear) PRN; With Meals    DVT Prophylaxis: Heparin drip  Nixon Catheter: Not present  Lines: PRESENT      Hemodialysis Vascular Access Right Subclavian-Site Assessment: WDL  Hemodialysis Vascular Access Arteriovenous fistula Left Forearm-Site Assessment: WDL except (fistula infiltrated, not in use)      Cardiac Monitoring: ACTIVE order. Indication: Tachyarrhythmias, acute (48 hours)  Code Status: Full Code      Clinically Significant Risk Factors         # Hyponatremia: Lowest Na = 129 mmol/L in last 2 days, will monitor as appropriate      # Hypoalbuminemia: Lowest albumin = 2.9 g/dL at 10/14/2023 10:42 AM, will monitor as appropriate         # Hypertension: Noted on problem list        # Overweight: Estimated body mass index is 26.66 kg/m  as calculated from the following:    Height as of this encounter: 1.829 m (6').    Weight as of this encounter: 89.2 kg (196 lb 9.6 oz).     # Moderate Malnutrition: based on nutrition assessment           Disposition Plan      Expected Discharge Date: 10/18/2023      Destination: home with family;inpatient rehabilitation facility  Discharge Comments: 10-11 angio and pericardiocentesis and HD          Franky Monet MD  Hospitalist Service  [facility]  Securely message with [employer] (more info)  Text page via [employer] Paging/Directory   _  _____________________________________________________________________    Interval History     No acute events  overnight  Paracentesis completed with no complications  He denies any lightheadedness or dizziness.    No CP/SOB  No fevers    Physical Exam     Temp: 98.3  F (36.8  C) Temp src: Oral BP: (!) 87/51 Pulse: 75   Resp: 18 SpO2: 95 % O2 Device: None (Room air) Oxygen Delivery: 2 LPM         General: Patient appears comfortable and in no acute distress.  Respiratory: Lungs are clear to auscultation bilaterally with no wheeze or crackles and has right-sided permacath  Cardiovascular: Regular rate , S1 and S2 normal with no murmer or rubs or gallops  Abdomen:   soft , non tender , mild distended distended and bowel sound present   Skin: No skin rashes   Neurologic:  No facial droop  Musculoskeletal: Normal Range of motion over upper and lower extremities bilaterally   Psychiatric: cooperative          Medical Decision Making       Time spent in care of patient is 50 minutes and I discussed plan of care with the patient, nurse , and discussed plan with the patient's brother and the family     Data         Imaging results reviewed over the past 24 hrs:   No results found for this or any previous visit (from the past 24 hour(s)).

## 2023-10-16 NOTE — PROGRESS NOTES
Assessment and Plan:     Recurrent pericardial effusion   - Patient underwent pericardiocentesis on 9/29, follow-up echo with trivial effusion   - Echo 10/8 and 10/10 demonstrated increasing size of pericardial effusion   - s/p pericardiocentesis 10/11 with 560 mL removed.    - Right heart catheterization demonstrated normal cardiac filling pressures, indicating that pericardial effusion, pleural effusion, and ascites are not likely due to congestive heart failure  - Recurrent effusions likely 2/2 ESRD and progressive liver disease     NSTEMI  - Patient presented with rapid A-fib without ischemic ECG changes.  - Troponin 346 > 307     Paroxysmal atrial fibrillation   - Presented with wide-complex tachycardiac felt to be Afib s/p failed DCCV x 2 in ED  - Subsequently converted to sinus on amiodarone, now on oral amiodarone  - On apixaban 5 mg BID     ESRD on HD  - Nephrology following, HD on MWF   - Awaiting renal transplant      Pulmonary embolism identified by CT at time of admission  -On apixaban 5 mg p.o. twice daily     6.  NAFLD s/p liver transplant in 2016, now with recurrent ascites and portal hypertension  - s/p paracentesis x 2 this admission.  Paracentesis again today.  - RHC showed portal hypertension with pressure gradient of 10 mmHg.     Recommendations:    The patient has not had recurrent atrial fibrillation since shortly after admission.  I would suggest that the risk of liver toxicity with amiodarone outweighs the benefit of rhythm stabilization at this point.  I suspect that the risk of recurrent atrial fibrillation is significantly lower now other medical issues have stabilized.  I will discontinue amiodarone.  It is a long acting slow tapering medication will still be somewhat effective over the next several weeks.    Repeat echocardiogram tomorrow to determine whether pericardial effusion continues to recur.  This will help discharge planning.       DARREN HODGE MD        Interval  History:     denies chest pain and denies shortness of breath          Medications:      - MEDICATION INSTRUCTIONS for Dialysis Patients -   Does not apply See Admin Instructions    albumin human  25 g Intravenous Once    amiodarone  200 mg Oral BID    apixaban ANTICOAGULANT  5 mg Oral BID    gabapentin  300 mg Oral At Bedtime    guaiFENesin  600 mg Oral BID    lacosamide  100 mg Oral QPM    lacosamide  50 mg Oral QAM    melatonin  3 mg Oral At Bedtime    midodrine  10 mg Oral TID w/meals    multivitamin RENAL  1 capsule Oral Daily    pantoprazole  40 mg Oral QAM AC    sevelamer carbonate  800 mg Oral 4x Daily    tacrolimus  0.5 mg Oral QPM    tacrolimus  1 mg Oral QAM            Physical Exam:   Blood pressure 99/69, pulse 77, temperature 97.5  F (36.4  C), temperature source Oral, resp. rate 18, height 1.829 m (6'), weight 89.2 kg (196 lb 9.6 oz), SpO2 97%.  Vitals:    10/05/23 0342 10/05/23 0400 10/05/23 0745 10/07/23 0645   Weight: 86.2 kg (190 lb) 89 kg (196 lb 3.4 oz) 93.2 kg (205 lb 7.5 oz) 91.2 kg (201 lb 1 oz)    10/08/23 0000 10/09/23 0620 10/10/23 0520 10/11/23 0529   Weight: 92.6 kg (204 lb 2.3 oz) 94.2 kg (207 lb 11.2 oz) 92.8 kg (204 lb 8 oz) 89.4 kg (197 lb 3.2 oz)    10/12/23 0528 10/13/23 0540 10/13/23 1420 10/14/23 0610   Weight: 87.7 kg (193 lb 4.8 oz) 87 kg (191 lb 14.4 oz) 86.7 kg (191 lb 1.6 oz) 86.6 kg (190 lb 14.4 oz)    10/15/23 0626 10/15/23 1639 10/16/23 0707   Weight: 88.4 kg (194 lb 12.8 oz) 88.9 kg (195 lb 14.4 oz) 89.2 kg (196 lb 9.6 oz)         Intake/Output Summary (Last 24 hours) at 10/16/2023 7328  Last data filed at 10/16/2023 1205  Gross per 24 hour   Intake 360 ml   Output 2400 ml   Net -2040 ml       10/11 1500 - 10/16 1459  In: 3403 [P.O.:3403]  Out: 5110 [Drains:710]  Net: -1707    Exam:  GENERAL APPEARANCE ADULT: Alert, in no acute distress  RESP: lungs clear to auscultation   CV: regular rate and rhythm, no murmur, rub, or gallop  ABDOMEN: normal bowel sounds, soft,  "nontender, no hepatosplenomegaly or other masses  EXTREMITIES: No edema         Data:   LABS (Last four results)  CMP  Recent Labs   Lab 10/15/23  0747 10/14/23  1042 10/13/23  0850 10/12/23  0644   * 130* 127* 134*   POTASSIUM 4.7 4.4 4.7 4.5   CHLORIDE 91* 93* 89* 94*   CO2 23 23 24 27   ANIONGAP 15 14 14 13   * 146* 141* 117*   BUN 52.2* 35.3* 53.4* 36.1*   CR 6.38* 4.83* 6.37* 4.89*   GFRESTIMATED 9* 13* 9* 13*   KELLY 9.1 8.8 8.7 8.9   PROTTOTAL 6.4 6.1* 6.2* 6.3*   ALBUMIN 3.0* 2.9* 2.9* 3.2*   BILITOTAL 0.6 0.5 0.6 0.8   ALKPHOS 164* 142* 150* 129   AST 12 12 12 12   ALT 6 7 7 8     CBC  Recent Labs   Lab 10/14/23  1042 10/13/23  1155 10/12/23  0644 10/11/23  0626   WBC 8.4 6.5 6.4 10.1   RBC 2.51* 2.65* 2.50* 2.78*   HGB 7.9* 8.3* 8.0* 8.6*   HCT 25.2* 25.9* 24.8* 27.3*    98 99 98   MCH 31.5 31.3 32.0 30.9   MCHC 31.3* 32.0 32.3 31.5   RDW 16.6* 15.8* 15.4* 15.2*   * 143* 128* 183     INRNo lab results found in last 7 days.  TROPONINS No results found for: \"TROPI\", \"TROPONIN\", \"TROPR\", \"TROPN\"                                                                                                            DARREN HODGE MD   "

## 2023-10-16 NOTE — PROGRESS NOTES
Renal Medicine Inpatient Dialysis Note                                Blanco Osborne MRN# [id number]   Age: 58 year old YOB: 1965   Date of Admission: 10/5/2023 Hospital LOS: 11          Assessment/Plan:     1.  ESRD on HD      MWF. Deirdre DOBSON . Dr. Augustine weems. EDW 85.5kg. 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 Still has [facility] TD. Called his unit, they prefer us to use the catheter for his run here tomorrow. No heparin.      2.  CKD MBD   Continue PTA renvela.      3.  Anemia of CKD     4.   Pericardial effusion, ascites.      3. Recent Sepsis syndrome.  Was in ICU on pressors. ON zosyn for possible SBP. Improved hemodynamics on 15mg mg TID midodrine. addition dose 10mg mid run during HD.      3.  PE.      4.  Hx liver transplant. New ascites, portal HTN      Continue MWF schedule       Interval History:     Dialysis run parameters reviewed with dialysis RN at patient bedside.  Seen on run  Comfortable  SBP 90 to 100 range    3.5 hours  2K  UF 2.5 liter      ROS     GENERAL: NAD, No fever,chills  R: NEGATIVE for significant cough or SOB  CV: NEGATIVE for chest pain, palpitations  EXT: no change edema  ROS otherwise negative    Dialysis Parameters:     Vitals were reviewed  Patient Vitals for the past 8 hrs:   BP Temp Temp src Pulse Resp SpO2 Weight   10/16/23 0930 100/65 -- -- 77 -- -- --   10/16/23 0915 (!) 88/63 -- -- 77 -- -- --   10/16/23 0900 96/71 -- -- 79 -- -- --   10/16/23 0845 93/64 -- -- 78 -- -- --   10/16/23 0830 100/68 -- -- 75 -- 98 % --   10/16/23 0820 99/66 98.2  F (36.8  C) Oral 77 18 92 % --   10/16/23 0707 114/68 98.3  F (36.8  C) Oral 80 20 92 % 89.2 kg (196 lb 9.6 oz)     I/O last 3 completed shifts:  In: 240 [P.O.:240]  Out: -     Vitals:    10/13/23 1420 10/14/23 0610 10/15/23 0626 10/15/23 1639   Weight: 86.7 kg (191 lb 1.6 oz) 86.6 kg (190 lb 14.4 oz) 88.4 kg (194 lb 12.8 oz)  88.9 kg (195 lb 14.4 oz)    10/16/23 0707   Weight: 89.2 kg (196 lb 9.6 oz)       Current Weight: 89.2  Dry Weight: 86  Dialysis Temp: 36.5  C  Access Device: IJ  Access Site: right  Dialyzer: Revaclear  Dialysis Bath: 2  Sodium Profile: n  UF Goal: 2.5  Blood Flow Rate (mL/min): 400  Total Treatment Time (hrs): 3.5  Heparin: Low dose as required      EPO dose: n  Zemplar: n  IV Fe: n      Medications and Allergies:     Reviewed      Physical Exam:     Seen and examined during course of dialysis run    BP 96/71   Pulse 79   Temp 98.2  F (36.8  C) (Oral)   Resp 18   Ht 1.829 m (6')   Wt 89.2 kg (196 lb 9.6 oz)   SpO2 98%   BMI 26.66 kg/m      GENERAL: awake, alert, follows  HEENT: NC/AT, PERRLA, EOMI, non icteric, pharynx moist without lesion  NECK: supple, no masses or adenopathy  RESP: clear  CV: RRR, normal S1 S2  ABDOMEN: S/NT, BS present  MS: no edema  SKIN: catheter site clean without drainage  NEURO: speech normal and cranial nerves 2-12 intact  PSYCH: affect normal/bright    Data:       Recent Labs   Lab 10/15/23  0747   *   POTASSIUM 4.7   CHLORIDE 91*   CO2 23   ANIONGAP 15   *   BUN 52.2*   CR 6.38*   GFRESTIMATED 9*   KELLY 9.1     Recent Labs   Lab 10/15/23  0747 10/14/23  1042   POTASSIUM 4.7 4.4     Recent Labs   Lab 10/15/23  0747 10/14/23  1042 10/13/23  0850 10/12/23  0644   ALBUMIN 3.0* 2.9* 2.9* 3.2*     Recent Labs   Lab 10/14/23  1042 10/13/23  1155 10/12/23  0644 10/11/23  0626   HGB 7.9* 8.3* 8.0* 8.6*         G Jose Reilly MD    [facility] - Nephrology  [phone]

## 2023-10-16 NOTE — PLAN OF CARE
10/15/-5334     Summary:  Presented to the ER on 10/05 with palpations, hypotension  and chest discomfort. Found to have acute hypoxic respiratory failure and severe sepsis with septic shock     Primary Diagnosis: Severe sepsis with septic shock - due to multifactorial pneumonia, hypotension     Hx:ESRD, on hemodialysis q. Monday, Wednesday, Friday, post liver transplant in 2016, pericardial effusion, anxiety, RLS, seizure disorder, CHRISTIAN, stroke, A-fib, pleural effusion     Orientation: A+Ox4, forgetful at times     Aggression Stop Light: Green     Mobility: SBA      Pain Management:  PRN Percocet      Diet: 2 g sodium, very low fat diet. Calorie counts. Supplements given between meals.     Bowel/Bladder: Continent of B/B. Minimal UOP (baseline).On HD     Abnormal Lab/Assessments: Mild hypotension, VSS on room air. Na-129, Hgb-7.9, Creatinine-6.38     Drain/Device/Wound: L dialysis fistula (ONEIL), R chest CVC, L groin & neck insertion sites (CDI). R PIV-SL. Bruises on L hand, R groin and perineum.     Consults: ID, cardio, PT,OT, Nephro     D/C Day/Goals/Place: TBD-to home w/ PT, OT     Shift Note: VSS on RA, has pain at L hand and headache, managed with PRN Tylenol. Pt doesn't want to take Percocet and Dilaudid.  SBA, walked along the hallway. Abdomen is distended. HD on M/W/F. No BP, lab on left arm. When pt went to the BR, he hit his elbow on the wall with a small skin tear, that was bleeding, pressure applied, mepilex applied. CMP and Tacrolimus level to check AM.  Tele-NSR.

## 2023-10-16 NOTE — PROGRESS NOTES
10/16/23 1800   Signing Clinician's Name / Credentials   Signing clinician's name / credentials Alyssa Duffy, DPT   Wright Balance Scale (SARIKA ROSS, MURPHY S, TATIANNA JUAN, FELICIA HINKLE: MEASURING BALANCE IN THE ELDERLY: VALIDATION OF AN INSTRUMENT. CAN. J. PUB. HEALTH, JULY/AUGUST SUPPLEMENT 2:S7-11, 1992.)   Sit To Stand 3   Standing Unsupported 4   Sitting Unsupported 4   Stand to Sit 4   Transfers 4   Standing with Eyes Closed 2   Standing Unsupported, Feet Together 3   Reach Forward With Outstretched Arm 3   Retrieve Object From Floor 4   Turning to Look Behind 4   Turn 360 Degrees 3   Placing Alternate Foot on Stool (4-6 inches) 2   Unsupported Tandem Stand (Demonstrate to Subject) 2   One Leg Stand 2   Total Score (A score of 45 or less has been correlated with an increased risk of falls)   Total Score (out of 56) 44       Wright Balance Scale (BBS) Cutoff Scores: A score of < 45 /56 indicates an increased risk for falls.     The BBS is a measure of static and dynamic standing balance that has been validated in community dwelling elderly individuals and individuals who have Parkinson's Disease, MS, and those who are s/p CVA and TBI. The test is administered without an assistive device. Scores from the Wright are used to determine the probability of falling based on the patient's previous history of falls and their test performance.     Minimal Detectable Change = 6.5   & Minimal Detectable Change (Parkinson's Disease) = 5 according to Viet & Geeney 2008    Assessment (rationale for performing, application to patient s function & care plan): Pt scored below 44/56, is at risk for falls.   (Minutes billed as physical performance test)

## 2023-10-16 NOTE — PROGRESS NOTES
CLINICAL NUTRITION SERVICES - REASSESSMENT NOTE      Future/Additional Recommendations:     Continue with fat-free nutrition supplements between meals    3 day calorie count average = 1666 cals (76% needs), 105 gm pro (100% needs)  Calorie count reflects pt meeting at least 2/3 nutrition needs with oral intake       Malnutrition: (10/13)  % Weight Loss:  None noted - fluid shifts may be impacting / masking losses  % Intake:  No decreased intake noted  Subcutaneous Fat Loss:  Orbital region mild depletion and Upper arm region mild depletion  Muscle Loss:  Temporal region moderate depletion, Clavicle bone region moderate depletion, and Dorsal hand region moderate depletion  Fluid Retention:  None noted     Malnutrition Diagnosis: Moderate malnutrition  In Context of:  Chronic illness or disease       EVALUATION OF PROGRESS TOWARD GOALS   Diet:    2gm Na, Very Low Fat (<10gm)  GelateinPLUS at 10am and 2pm (150 cals, 20 gm pro each)  Room Service with Assist    Intake/Tolerance:    Chart reviewed  On daily multivitamin RENAL  BM x1 this morning    3 day calorie count average = 1666 cals (76% needs), 105 gm pro (100% needs)    Pt currently off the unit at dialysis      ASSESSED NUTRITION NEEDS:  Dosing Weight (10/14 - lowest wt) ~87 kg  Estimated Energy Needs: 6904-6433+ kcals (25-30+ Kcal/Kg)  Justification: maintenance, on dialysis   Estimated Protein Needs: 104-157 grams protein (1.2-1.8 g pro/Kg)  Justification: Repletion      NEW FINDINGS:   Plan for HD today    Previous Goals (10/13):   Intake of at least 2/3 nutrition needs to avoid recommendation for TF   Evaluation: Met    Previous Nutrition Diagnosis (10/13):   Altered GI function related to digestion of fat as evidenced by very low fat diet ordered.   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

## 2023-10-16 NOTE — PROGRESS NOTES
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 2016 now with progressive cirrhosis (liver bx 12/2022) with evidence of portal hypertension and ascites, paroxysmal atrial fibrillation on Eliquis, DM type 2, history of CVA, hypertension, CHRISTIAN on BiPAP, chronic pericardial effusion, ESRD on dialysis who presented to [facility] ED on 10/5/23 with chest discomfort and palpitations, admitted with severe sepsis with septic shock requiring pressors secondary to multifactorial pneumonia, atrial fibrillation with aberrancy s/p 2 failed attempts at cardioversion, worsening pericardial effusion on ECHO s/p pericardiocentesis 10/11, and PE started on IV heparin. GI consulted for \"liver transplant, developing portal hypertension, initial tap concerning for sbp\".       # Cirrhosis secondary to NAFLD s/p liver transplant 2016 now with progressive cirrhosis with evidence of portal hypertension and ascites   # Chylous ascites   # Abdominal pain   - Patient follows with Dr. Simms, though notably has not been seen in years; last clinic visit 4/21/2020 with plan for 6 month follow up. Next appt 12/2023   - Patient presented with palpitations and chest discomfort but also noted worsening abdominal distention and pain that began around the same time. He has required paracentesis in the past but stated this was back in 2016.    - PTA on tacrolimus 1mg q 12 hours. Tacro level supratherapeutic at 8 on 10/11 (desired levels 3-5), held initially then resumed at 1mg in the morning and 0.5mg in the evening. Tacro level 4.2 10/15, level not collected today. Discussed with lab, the order is unit collect. Patient received morning tacro dose 1mg at 1258.  Will await tacrolimus trough level for tomorrow morning lab draw   - Admit labs with normal LFTs other " than mildly elevated alk phos which is elevated again at 164, INR 1.55 10/5 on admission. Liver bx 12/2022 with evidence of cirrhosis.   - Paracentesis   - 10/6: 3L removed with 1119 nucleated cells, differential returned and not consistent with SBP, triglycerides >400 consistent with chylous ascites. SAAG 1.7 consistent with protal hypertension. Cytology negative for malignancy. Cultures are negative including aerobic culture, anaerobic culture and fungal.   - 10/10: 4.65L removed with 761 total nucleated cells, SAAG 3.4. Cultures negative. Triglycerides 503  - 10/12: 3.4L removed with 719 total nucleated cells, SAAG 2.8. Cultures negative. Cytology pending. Flow cytometry with polytypic B cells, triglycerides 303, lipase 37.   - Antibiotics: Zosyn (10/5-10/12) for pneumonia and possible SBP (no evidence of SBP on peritoneal fluid analysis 10/6), Augmentin (10/12-10/13) for pneumonia   - Quantiferon gold TB pending  - s/p RHC 10/11 which showed normal right and left sided filling pressures, mild elevated pulmonary pressures and portal hypertension with hepatic vein pressure gradient of 10mmHg     - Ascites likely related to portal HTN in the setting of cirrhosis, further c/b disruption of lymphatics (d/t increased lymph flow and portal HTN seen with cirrhosis) leading to chylous ascites as reflective of persistent TG >200 in ascites fluid.  Currently on 2gm NA and very low fat diet. He continues to have ongoing TG elevations in ascites and requiring paracentesis every 2-4 days for relief of abd distention. Suspect ascites re-accumulation exacerbated by chyle formation. High risk for developing malnutrition (PRO losses) and further immunocompromise with chyle losses. RD following. Would avoid MCT supplementation in the setting of cirrhosis (increased risk for narcosis/altering mentation).     # Moderate protein-energy malnutrition  - RD following for calorie counts, education on low fat diet, and assistance with  increasing PRO/kcals      # Diarrhea, resolved  - Loose stools began 10/9, per nursing charting patient having 1-2 stools per day   - No hx of Cdiff, C diff negative 10/10  - Treated with Zosyn 10/5-10/12, Augmentin 10/12-10/13 and given one dose of vancomycin 10/5  - Could be secondary to bowel edema/poor absorption from portal hypertension, but likely antibiotics as diarrhea has resolved when antibiotics were stopped      # Recurrent pericardial effusion  # Paroxysmal atrial fibrillation   # NSTEMI  - PTA on Eliquis   - Cardiology following  - ECHO 10/10 with recurrent pericardial effusion slightly larger than seen on ECHO 10/8 s/p pericardiocentesis 10/11 with 560cc removed   - s/p RHC 10/11 which showed normal right and left sided filling pressures, mild elevated pulmonary pressures and portal hypertension with hepatic vein pressure gradient of 10mmHg      # Pulmonary embolism  - Noted on CT scan   - Treated with heparin gtt, now on Eliquis BID      # ESRD on HD  - Nephrology following, HD on MWF  - On kidney transplant list       RECOMMENDATIONS:  - Paracentesis today with ascites studies: triglcyerides, AFB culture and stain, fungal cultures, cell count with diff, cultures, albumin and total protein. Give 25g IV albumin given symptomatic hypotension following HD and paracentesis   -If TG improved on next paracentesis, would continue very low fat (LCT) diet for 2-3 weeks then liberalize fat restriction for LCT challenge and repeat TG on next paracentesis to evaluate if chylous ascites persists.  -If chylous ascites persists despite compliance to low fat diet, consider IR consult for lymphoscintigraphy (possibly with SPECT CT) to locate site of lymphatic leak for embolization of leak.   - Await Quantiferon Gold   - Follow tacrolimus drug levels; Discussed with Jacklyn Liu, transplant hepatology CNP  -If drug level 3-5, continue Tacrolimus and continue to check level M/W/F to monitor levels.                -If  drug level >5, would continue Tacrolimus at decreased dose of 0.5mg BID and recheck level daily until within therapeutic range.  - If hypotensive and sx orthostasis post paracentesis or HD runs, would rec give IV Alb, 12.5-25 g (planning to give 25g with paracentesis today)  - Appreciate RD following, continue low fat diet   - Continue PPI     GI will continue to follow     Thank you for involving us in this patient's care. Please do not hesitate to contact the GI service with any questions or concerns.     Overall time spent on the date of this encounter preparing to see the patient (including chart review of available notes, clinical status events, imaging and labs); obtaining and/or reviewing separately obtained history; ordering medications, tests or procedures; communicating with other health care professionals; and documenting the above clinical information in the electronic medical record was 45 minutes.    Martha Medrano PA-C  GI Service  [facility]  Text Page (Monday-Friday, 8am-4pm)    To page the [facility] GI provider 24 hours a day, please click [facility] and select GASTROENTEROLOGY MEDICINE ADULT / [facility] in the drop-down menu.   _______________________________________________________________      Subjective: Nursing notes and 24hr events reviewed. Patient seen during dialysis run. He reports feeling more distended with mild discomfort across the front of his abdomen. He has been ambulating in the halls, endorses feeling lightheaded occasionally. He has been tolerating low fat diet, states he eats cereal two times a day and chicken. He reports the diarrhea has resolved, having one bowel movement a day. No black or bloody stools.      ROS:   4 pt ROS negative unless noted in subjective.     Medications:  Current Facility-Administered Medications   Medication    - MEDICATION INSTRUCTIONS for Dialysis Patients -    acetaminophen (TYLENOL) tablet 650 mg    Or     acetaminophen (TYLENOL) Suppository 650 mg    amiodarone (PACERONE) tablet 200 mg    apixaban ANTICOAGULANT (ELIQUIS) tablet 5 mg    benzocaine-menthol (CHLORASEPTIC) 6-10 MG lozenge 1 lozenge    calcium carbonate (TUMS) chewable tablet 500 mg    camphor-menthol (DERMASARRA) lotion    glucose gel 15-30 g    Or    dextrose 50 % injection 25-50 mL    Or    glucagon injection 1 mg    gabapentin (NEURONTIN) capsule 300 mg    guaiFENesin (MUCINEX) 12 hr tablet 600 mg    HYDROmorphone (DILAUDID) injection 0.2 mg    hydrOXYzine (ATARAX) tablet 25 mg    lacosamide (VIMPAT) tablet 100 mg    lacosamide (VIMPAT) tablet 50 mg    melatonin tablet 3 mg    midodrine (PROAMATINE) tablet 10 mg    midodrine (PROAMATINE) tablet 10 mg    multivitamin RENAL (TRIPHROCAPS) capsule 1 capsule    naloxone (NARCAN) injection 0.2 mg    Or    naloxone (NARCAN) injection 0.4 mg    Or    naloxone (NARCAN) injection 0.2 mg    Or    naloxone (NARCAN) injection 0.4 mg    No heparin via hemodialysis machine    ondansetron (ZOFRAN ODT) ODT tab 4 mg    Or    ondansetron (ZOFRAN) injection 4 mg    oxyCODONE IR (ROXICODONE) half-tab 2.5 mg    pantoprazole (PROTONIX) EC tablet 40 mg    Patient is already receiving anticoagulation with heparin, enoxaparin (LOVENOX), warfarin (COUMADIN)  or other anticoagulant medication    QUEtiapine (SEROquel) half-tab 50 mg    rOPINIRole (REQUIP) tablet 0.5 mg    sevelamer carbonate (RENVELA) tablet 800 mg    sodium chloride 0.9% BOLUS 100-150 mL    sodium chloride 0.9% BOLUS 250 mL    sodium chloride 0.9% BOLUS 300 mL    tacrolimus (GENERIC) suspension 0.5 mg    tacrolimus (GENERIC) suspension 1 mg       Objective:  Blood pressure 98/62, pulse 76, temperature 98.2  F (36.8  C), temperature source Oral, resp. rate 18, height 1.829 m (6'), weight 89.2 kg (196 lb 9.6 oz), SpO2 98%.  Gen: Lying in bed, on HD run. Appears comfortable  HEENT: NCAT. Conjunctiva clear. Sclera anicteric   CV: Regular rate  Resp: Non-labored  breathing  Abd: Distended, mild generalized tenderness, no guarding or rebound   Skin: No jaundice  Ext: warm, well perfused    Neuro: grossly normal  Mental status/Psych: A&O. Asks/answers questions appropriately, occasionally repeats questions/statements      PROCEDURES:  10/12: Paracentesis with 3.4 L of cloudy/light brown fluid drained.  There were no immediate complications.     10/10: Paracentesis with 4.65 L of  straw colored fluid was drained. There were no immediate complications.     10/6: Paracentesis with 3L  cloudy, turbid milky fluid aspirated into vacuum bottles.    LABS:  BMP  Recent Labs   Lab 10/15/23  0747 10/14/23  1042 10/13/23  0850 10/12/23  0644   * 130* 127* 134*   POTASSIUM 4.7 4.4 4.7 4.5   CHLORIDE 91* 93* 89* 94*   KELLY 9.1 8.8 8.7 8.9   CO2 23 23 24 27   BUN 52.2* 35.3* 53.4* 36.1*   CR 6.38* 4.83* 6.37* 4.89*   * 146* 141* 117*     CBC  Recent Labs   Lab 10/14/23  1042 10/13/23  1155 10/12/23  0644 10/11/23  0626   WBC 8.4 6.5 6.4 10.1   RBC 2.51* 2.65* 2.50* 2.78*   HGB 7.9* 8.3* 8.0* 8.6*   HCT 25.2* 25.9* 24.8* 27.3*    98 99 98   MCH 31.5 31.3 32.0 30.9   MCHC 31.3* 32.0 32.3 31.5   RDW 16.6* 15.8* 15.4* 15.2*   * 143* 128* 183     INRNo lab results found in last 7 days.  LFTs  Recent Labs   Lab 10/15/23  0747 10/14/23  1042 10/13/23  0850 10/12/23  0644   ALKPHOS 164* 142* 150* 129   AST 12 12 12 12   ALT 6 7 7 8   BILITOTAL 0.6 0.5 0.6 0.8   PROTTOTAL 6.4 6.1* 6.2* 6.3*   ALBUMIN 3.0* 2.9* 2.9* 3.2*      PANC  Recent Labs   Lab 10/13/23  0850   LIPASE 24     CULTURES:   7-Day Micro Results       Collected Updated Procedure Result Status      10/14/2023 2101 10/15/2023 1100 Cytomegalovirus DNA by PCR, Quantitative [[id number]]    Blood from Arm, Right    Final result Component Value Units   CMV DNA IU/mL Not Detected IU/mL            10/14/2023 1808 10/14/2023 2211 Respiratory Panel PCR [[id number]]    Swab from Nasopharyngeal    Final result  Component Value   Adenovirus Not Detected   Coronavirus Not Detected   This test detects Coronavirus 229E, HKU1, NL63 and OC43 but does not distinguish between them. It does not detect MERS ( Respiratory Syndrome), SARS (Severe Acute Respiratory Syndrome) or 2019-nCoV (Novel 2019) Coronavirus.   Human Metapneumovirus Not Detected   Human Rhin/Enterovirus Not Detected   Influenza A Not Detected   Influenza A, H1 Not Detected   Influenza A 2009 H1N1 Not Detected   Influenza A, H3 Not Detected   Influenza B Not Detected   Parainfluenza Virus 1 Not Detected   Parainfluenza Virus 2 Not Detected   Parainfluenza Virus 3 Not Detected   Parainfluenza Virus 4 Not Detected   Respiratory Syncytial Virus A Not Detected   Respiratory Syncytial Virus B Not Detected   Chlamydia Pneumoniae Not Detected   Mycoplasma Pneumoniae Not Detected            10/13/2023 1155 10/13/2023 1212 Quantiferon TB Gold Plus Grey Tube [[id number]]   Peripheral Blood    In process Component Value   No component results            10/13/2023 1155 10/13/2023 1212 Quantiferon TB Gold Plus Green Tube [[id number]]   Peripheral Blood    In process Component Value   No component results            10/13/2023 1155 10/13/2023 1212 Quantiferon TB Gold Plus Yellow Tube [[id number]]   Peripheral Blood    In process Component Value   No component results            10/13/2023 1155 10/13/2023 1212 Quantiferon TB Gold Plus Purple Tube [[id number]]   Peripheral Blood    In process Component Value   No component results            10/12/2023 1113 10/13/2023 2103 Cell count with differential fluid [[id number]]    (Abnormal)   Ascites Fluid from Paracentesis    Final result Component Value   No component results            10/12/2023 1113 10/12/2023 1235 Albumin fluid [[id number]]    Ascites Fluid from Paracentesis    Final result Component Value Units   Albumin Fluid Source Abdomen    Albumin fluid 1.0 g/dL            10/12/2023 1113 10/12/2023  1235 Protein fluid [[id number]]    Ascites Fluid from Paracentesis    Final result Component Value Units   Protein Fluid Source Abdomen    Protein Total Fluid 2.4 g/dL            10/12/2023 1113 10/16/2023 0745 Ascites Fluid Aerobic Bacterial Culture Routine [[id number]]   Ascites Fluid from Peritoneum    Preliminary result Component Value   Culture No growth after 3 days  [P]                10/12/2023 1113 10/13/2023 0828 Cytology, non-gynecologic [[id number]]   Ascites Fluid from Abdomen    In process Component Value   No component results            10/12/2023 1113 10/13/2023 2101 Cell Count Body Fluid [[id number]]    (Abnormal)   Ascites Fluid from Paracentesis    Final result Component Value Units   Color Yellow    Clarity Turbid    Body Fluid Comment Slides sent for a path review.    Cell Count Fluid Source Abdomen    Total Nucleated Cells 719 /uL            10/12/2023 1113 10/13/2023 2103 Differential Body Fluid [[id number]]    Ascites Fluid from Paracentesis    Final result Component Value Units   % Neutrophils 1 %   % Lymphocytes 88 %   % Monocyte/Macrophages 11 %   Absolute Neutrophils, Body Fluid 7.2 /uL            10/12/2023 1113 10/13/2023 1336 Triglyceride Fluid [[id number]]    Ascites Fluid from Paracentesis    Final result Component Value Units   Triglyceride Fluid Source --    Abdomen   Triglyceride Fluid 303 mg/dL            10/12/2023 1113 10/13/2023 1332 Lipase fluid [[id number]]    Ascites Fluid from Paracentesis    Final result Component Value Units   Lipase Fluid Source --    Abdomen   Lipase fluid 37 U/L            10/12/2023 1113 10/13/2023 1402 Bilirubin fluid [[id number]]    Ascites Fluid from Paracentesis    Final result Component Value Units   Bilirubin Fluid Source Abdomen    Bilirubin fluid 0.3 mg/dL            10/11/2023 1355 10/11/2023 1503 Albumin fluid [[id number]]    Pericardial Fluid from Pericardium    Final result Component Value Units   Albumin Fluid Source  Pericardium    Albumin fluid 2.7 g/dL            10/11/2023 1355 10/11/2023 1719 Cell count with differential fluid [[id number]]    (Abnormal)   Pericardial Fluid from Pericardium    Final result Component Value   No component results            10/11/2023 1355 10/13/2023 1655 Cytology non gyn [[id number]]   Pericardial Fluid from Pericardium    Final result Component Value   Final Diagnosis This result contains rich text formatting which cannot be displayed here.   Clinical Information This result contains rich text formatting which cannot be displayed here.   Gross Description This result contains rich text formatting which cannot be displayed here.   Microscopic Description This result contains rich text formatting which cannot be displayed here.   Performing Labs This result contains rich text formatting which cannot be displayed here.            10/11/2023 1355 10/16/2023 0738 Pericardial Fluid Aerobic Bacterial Culture Routine [[id number]]   Pericardial Fluid from Pericardium    Final result Component Value   Culture No Growth               10/11/2023 1355 10/11/2023 1715 Gram stain [[id number]]   Pericardial Fluid from Pericardium    Final result Component Value   Gram Stain Result No organisms seen   Gram Stain Result 2+ WBC seen            10/11/2023 1355 10/11/2023 1503 Glucose fluid [[id number]]    Pericardial Fluid from Pericardium    Final result Component Value Units   Glucose Fluid Source Pericardium    Glucose fluid 79 mg/dL            10/11/2023 1355 10/11/2023 1503 Protein fluid [[id number]]    Pericardial Fluid from Pericardium    Final result Component Value Units   Protein Fluid Source Pericardium    Protein Total Fluid 4.9 g/dL            10/11/2023 1355 10/11/2023 1719 Cell Count Body Fluid [[id number]]    (Abnormal)   Pericardial Fluid from Pericardium    Final result Component Value Units   Color Red    Clarity Bloody    Cell Count Fluid Source Pericardium    Total Nucleated  Cells 2,558 /uL            10/11/2023 1355 10/11/2023 1719 Differential Body Fluid [[id number]]    Pericardial Fluid from Pericardium    Final result Component Value Units   % Neutrophils 62 %   % Lymphocytes 16 %   % Monocyte/Macrophages 18 %   % Eosinophils 4 %            10/10/2023 1711 10/10/2023 2030 C. difficile Toxin B PCR with reflex to C. difficile Antigen and Toxins A/B EIA [[id number]]    Stool from Per Rectum    Final result Component Value   C Difficile Toxin B by PCR Negative   A negative result does not exclude actual disease due to C. difficile and may be due to improper collection, handling and storage of the specimen or the number of organisms in the specimen is below the detection limit of the assay.            10/10/2023 1313 10/12/2023 2238 Cell count with differential fluid [[id number]]    (Abnormal)   Ascites Fluid from Paracentesis    Final result Component Value   No component results            10/10/2023 1313 10/10/2023 1438 Albumin fluid [[id number]]    Ascites Fluid from Paracentesis    Final result Component Value Units   Albumin Fluid Source Abdomen    Albumin fluid 1.3 g/dL            10/10/2023 1313 10/10/2023 1438 Protein fluid [[id number]]    Ascites Fluid from Paracentesis    Final result Component Value Units   Protein Fluid Source Abdomen    Protein Total Fluid 3.0 g/dL            10/10/2023 1313 10/15/2023 0643 Ascites Fluid Aerobic Bacterial Culture Routine [[id number]]   Ascites Fluid from Peritoneum    Final result Component Value   Culture No Growth               10/10/2023 1313 10/12/2023 2238 Cell Count Body Fluid [[id number]]    (Abnormal)   Ascites Fluid from Paracentesis    Final result Component Value Units   Color Pink    Clarity Turbid    Cell Count Fluid Source Abdomen    Total Nucleated Cells 761 /uL            10/10/2023 1313 10/12/2023 2238 Differential Body Fluid [[id number]]    Ascites Fluid from Paracentesis    Final result Component Value  Units   % Neutrophils 4 %   % Lymphocytes 73 %   % Monocyte/Macrophages 22 %   % Eosinophils 1 %   % Lining Cells 1 %   % Other Cells 1 %   Absolute Neutrophils, Body Fluid 26.6 /uL                   IMAGING:  CT Chest PE, A/P on 10/5/23  IMPRESSION:  1.  Large pericardial effusion. This measures 25 mm in greatest depth which is suggestive of at least 500 mL of fluid.  2.  Pulmonary arteries are increased in size with the main pulmonary artery measuring 39 mm. This is consistent with pulmonary arterial hypertension. Segmental and subsegmental pulmonary artery emboli are seen to the left upper lobe. No definite right   heart strain.  3.  Small bilateral pleural effusions with the right effusion appearing chronically loculated with pleural thickening. Bilateral lung consolidations right greater than left suspicious for multifocal pneumonia.  4.  Prior liver transplant. Multiple upper abdominal collaterals and splenomegaly suggestive of portal hypertension, with small to moderate abdominopelvic ascites.  5.  Prominent gas and fluid-filled loops of small and large bowel likely reactive ileus.     US LE on 10/5/23  IMPRESSION:   The right CFV and GSV are unable to be assessed due to the existing  arterial line. Exam is otherwise negative for deep vein thrombosis  bilaterally.     US RUE on 10/5/23  IMPRESSION:   1. Negative for right upper extremity DVT.  2. Short segment DVT in the right cephalic vein at the antecubital  fossa, which may be related to prior intravenous access.     US LUE on 10/5/23  IMPRESSION:  1. Negative for DVT in the left upper extremity.  2. Patent AV fistula with good blood flow volume of 1920 mL/min.  Moderate stenosis in the mid to upper outflow cephalic vein with  diameter reduced to 2.6 mm.     CXR on 10/5/23  IMPRESSION: Cardiac enlargement. Dual-lumen central line tip right atrium. Bibasilar atelectasis or infiltrate and small effusions.

## 2023-10-16 NOTE — PROGRESS NOTES
Potassium   Date Value Ref Range Status   10/15/2023 4.7 3.4 - 5.3 mmol/L Final   12/29/2022 3.4 3.4 - 5.3 mmol/L Final   04/20/2020 3.9 3.4 - 5.3 mmol/L Final     Potassium POCT   Date Value Ref Range Status   10/05/2023 3.6 3.4 - 5.3 mmol/L Final     Hemoglobin   Date Value Ref Range Status   10/14/2023 7.9 (L) 13.3 - 17.7 g/dL Final   04/20/2020 14.3 13.3 - 17.7 g/dL Final     Creatinine   Date Value Ref Range Status   10/15/2023 6.38 (H) 0.67 - 1.17 mg/dL Final   04/20/2020 1.06 0.66 - 1.25 mg/dL Final     Urea Nitrogen   Date Value Ref Range Status   10/15/2023 52.2 (H) 8.0 - 23.0 mg/dL Final   12/29/2022 46 (H) 7 - 30 mg/dL Final   04/20/2020 23 7 - 30 mg/dL Final     Sodium   Date Value Ref Range Status   10/15/2023 129 (L) 135 - 145 mmol/L Final     Comment:     Reference intervals for this test were updated on 09/26/2023 to more accurately reflect our healthy population. There may be differences in the flagging of prior results with similar values performed with this method. Interpretation of those prior results can be made in the context of the updated reference intervals.    04/20/2020 139 133 - 144 mmol/L Final     INR   Date Value Ref Range Status   10/05/2023 1.55 (H) 0.85 - 1.15 Final   10/07/2019 1.20 (H) 0.86 - 1.14 Final      Latest Reference Range & Units 09/27/23 17:50   Hep B Surface Agn Nonreactive  Nonreactive   Hepatitis B Surface Antibody Instrument Value <8.00 m[IU]/mL 0.12   Hepatitis B Surface Antibody  Nonreactive     DIALYSIS PROCEDURE NOTE  Hepatitis status of previous patient on machine log was checked and verified ok to use with this patients hepatitis status.  Patient dialyzed for 3.5 hrs. on a K2 bath with a net fluid removal of  2L.  A BFR of 400 ml/min was obtained via a CVC.     The treatment plan was discussed with Dr. Gale during the treatment.    Total heparin received during the treatment: 0 units.     Line flushed, clamped and capped with heparin 1:1000 1.9 mL (1900  units) per lumen    Meds  given: midodrine   Complications: Hypotension      Person educated: patient. Knowledge base substantial. Barriers to learning: none. Educated on procedure via verbal mode. Patient verbalized understanding.     ICEBOAT? Timeout performed pre-treatment  I: Patient was identified using 2 identifiers  C:  Consent Signed Yes  E: Equipment preventative maintenance is current and dialysis delivery system OK to use  B: Hepatitis B Surface Antigen: negative; Draw Date: 9/27/23      Hepatitis B Surface Antibody: susceptible; Draw Date: 9/27/23  O: Dialysis orders present and complete prior to treatment  A: Vascular access verified and assessed prior to treatment  T: Treatment was performed at a clinically appropriate time  ?: Patient was allowed to ask questions and address concerns prior to treatment  See Adult Hemodialysis flowsheet in EPIC for further details and post assessment.  Machine water alarm in place and functioning. Transducer pods intact and checked every 15min.   Pt returned via bed.  Chlorine/Chloramine water system checked every 4 hours.  Outpatient Dialysis at [facility]    Patient repositioned every 2 hours during the treatment.  Post treatment report given to Alyssa Pedro RN regarding

## 2023-10-16 NOTE — PLAN OF CARE
Goal Outcome Evaluation:                 Outcome Evaluation: Diet: 2gm Na, Very Low Fat Diet (>10gm/day).  Tolerating po well.    3 day calorie count average = 1666 cals (76% needs), 105 gm pro (100% needs)  Calorie count reflects pt meeting at least 2/3 nutrition needs with oral intake.

## 2023-10-16 NOTE — PLAN OF CARE
[id number]    Orientation: A&Ox4, forgetful  Activity: A1 w/GB+W  Diet/BS Checks: 2g Na, low fat  Tele: NSR  IV Access/Drains: R PIV SL, R CVC  Pain Management: PRN tylenol x1  Abnormal VS/Results: VSS on RA  Bowel/Bladder: Continent, no BM overnight  Skin/Wounds: Bruising to L arm and perineum, L forearm skin tear - mepilex in place  Consults: Cardiology, PT, OT, ID, SW  D/C Disposition: Pending improvement and placement plan  Other Info: Walked in hallway x1. Dialysis today.

## 2023-10-17 ENCOUNTER — APPOINTMENT (OUTPATIENT)
Dept: CARDIOLOGY | Facility: CLINIC | Age: 58
DRG: 871 | End: 2023-10-17
Attending: INTERNAL MEDICINE
Payer: COMMERCIAL

## 2023-10-17 LAB
ALBUMIN SERPL BCG-MCNC: 2.8 G/DL (ref 3.5–5.2)
ALP SERPL-CCNC: 142 U/L (ref 40–129)
ALT SERPL W P-5'-P-CCNC: 7 U/L (ref 0–70)
ANION GAP SERPL CALCULATED.3IONS-SCNC: 16 MMOL/L (ref 7–15)
AST SERPL W P-5'-P-CCNC: 14 U/L (ref 0–45)
ATRIAL RATE - MUSE: 68 BPM
ATRIAL RATE - MUSE: 70 BPM
BACTERIA FLD CULT: NO GROWTH
BILIRUB SERPL-MCNC: 0.5 MG/DL
BUN SERPL-MCNC: 47.8 MG/DL (ref 8–23)
CALCIUM SERPL-MCNC: 8.7 MG/DL (ref 8.6–10)
CHLORIDE SERPL-SCNC: 94 MMOL/L (ref 98–107)
CHYLO FLD QL: ABNORMAL
CREAT SERPL-MCNC: 5.68 MG/DL (ref 0.67–1.17)
DEPRECATED HCO3 PLAS-SCNC: 22 MMOL/L (ref 22–29)
DIASTOLIC BLOOD PRESSURE - MUSE: NORMAL MMHG
DIASTOLIC BLOOD PRESSURE - MUSE: NORMAL MMHG
EGFRCR SERPLBLD CKD-EPI 2021: 11 ML/MIN/1.73M2
ERYTHROCYTE [DISTWIDTH] IN BLOOD BY AUTOMATED COUNT: 18 % (ref 10–15)
GLUCOSE SERPL-MCNC: 99 MG/DL (ref 70–99)
HCT VFR BLD AUTO: 25 % (ref 40–53)
HGB BLD-MCNC: 7.9 G/DL (ref 13.3–17.7)
INR PPP: 2 (ref 0.85–1.15)
INTERPRETATION ECG - MUSE: NORMAL
INTERPRETATION ECG - MUSE: NORMAL
MCH RBC QN AUTO: 31.5 PG (ref 26.5–33)
MCHC RBC AUTO-ENTMCNC: 31.6 G/DL (ref 31.5–36.5)
MCV RBC AUTO: 100 FL (ref 78–100)
P AXIS - MUSE: 22 DEGREES
P AXIS - MUSE: 32 DEGREES
PLATELET # BLD AUTO: 133 10E3/UL (ref 150–450)
POTASSIUM SERPL-SCNC: 4.7 MMOL/L (ref 3.4–5.3)
PR INTERVAL - MUSE: 184 MS
PR INTERVAL - MUSE: 200 MS
PROT SERPL-MCNC: 5.9 G/DL (ref 6.4–8.3)
QRS DURATION - MUSE: 78 MS
QRS DURATION - MUSE: 88 MS
QT - MUSE: 408 MS
QT - MUSE: 414 MS
QTC - MUSE: 440 MS
QTC - MUSE: 440 MS
R AXIS - MUSE: 34 DEGREES
R AXIS - MUSE: 37 DEGREES
RBC # BLD AUTO: 2.51 10E6/UL (ref 4.4–5.9)
SARS-COV-2 RNA RESP QL NAA+PROBE: NEGATIVE
SODIUM SERPL-SCNC: 132 MMOL/L (ref 135–145)
STRONGYLOIDES IGG SER IA-ACNC: 0.1 IV
SYSTOLIC BLOOD PRESSURE - MUSE: NORMAL MMHG
SYSTOLIC BLOOD PRESSURE - MUSE: NORMAL MMHG
T AXIS - MUSE: 0 DEGREES
T AXIS - MUSE: 62 DEGREES
TACROLIMUS BLD-MCNC: 4.4 UG/L (ref 5–15)
TACROLIMUS BLD-MCNC: 4.7 UG/L (ref 5–15)
TME LAST DOSE: ABNORMAL H
TRIGL FLD-MCNC: 332 MG/DL
VENTRICULAR RATE- MUSE: 68 BPM
VENTRICULAR RATE- MUSE: 70 BPM
WBC # BLD AUTO: 7.6 10E3/UL (ref 4–11)

## 2023-10-17 PROCEDURE — 36415 COLL VENOUS BLD VENIPUNCTURE: CPT | Performed by: STUDENT IN AN ORGANIZED HEALTH CARE EDUCATION/TRAINING PROGRAM

## 2023-10-17 PROCEDURE — 99232 SBSQ HOSP IP/OBS MODERATE 35: CPT | Performed by: INTERNAL MEDICINE

## 2023-10-17 PROCEDURE — 99232 SBSQ HOSP IP/OBS MODERATE 35: CPT | Mod: FS | Performed by: NURSE PRACTITIONER

## 2023-10-17 PROCEDURE — 99233 SBSQ HOSP IP/OBS HIGH 50: CPT | Performed by: PHYSICIAN ASSISTANT

## 2023-10-17 PROCEDURE — 250N000009 HC RX 250: Performed by: INTERNAL MEDICINE

## 2023-10-17 PROCEDURE — 80053 COMPREHEN METABOLIC PANEL: CPT | Performed by: HOSPITALIST

## 2023-10-17 PROCEDURE — 99233 SBSQ HOSP IP/OBS HIGH 50: CPT | Performed by: STUDENT IN AN ORGANIZED HEALTH CARE EDUCATION/TRAINING PROGRAM

## 2023-10-17 PROCEDURE — 250N000013 HC RX MED GY IP 250 OP 250 PS 637: Performed by: HOSPITALIST

## 2023-10-17 PROCEDURE — 93321 DOPPLER ECHO F-UP/LMTD STD: CPT | Mod: 26 | Performed by: INTERNAL MEDICINE

## 2023-10-17 PROCEDURE — 87635 SARS-COV-2 COVID-19 AMP PRB: CPT | Performed by: STUDENT IN AN ORGANIZED HEALTH CARE EDUCATION/TRAINING PROGRAM

## 2023-10-17 PROCEDURE — 250N000011 HC RX IP 250 OP 636: Performed by: INTERNAL MEDICINE

## 2023-10-17 PROCEDURE — 85027 COMPLETE CBC AUTOMATED: CPT | Performed by: STUDENT IN AN ORGANIZED HEALTH CARE EDUCATION/TRAINING PROGRAM

## 2023-10-17 PROCEDURE — 93321 DOPPLER ECHO F-UP/LMTD STD: CPT

## 2023-10-17 PROCEDURE — 93308 TTE F-UP OR LMTD: CPT | Mod: 26 | Performed by: INTERNAL MEDICINE

## 2023-10-17 PROCEDURE — 250N000012 HC RX MED GY IP 250 OP 636 PS 637: Performed by: INTERNAL MEDICINE

## 2023-10-17 PROCEDURE — 80197 ASSAY OF TACROLIMUS: CPT | Performed by: DIETITIAN, REGISTERED

## 2023-10-17 PROCEDURE — 93325 DOPPLER ECHO COLOR FLOW MAPG: CPT | Mod: 26 | Performed by: INTERNAL MEDICINE

## 2023-10-17 PROCEDURE — 85610 PROTHROMBIN TIME: CPT | Performed by: STUDENT IN AN ORGANIZED HEALTH CARE EDUCATION/TRAINING PROGRAM

## 2023-10-17 PROCEDURE — 120N000001 HC R&B MED SURG/OB

## 2023-10-17 RX ORDER — CEFEPIME HYDROCHLORIDE 1 G/1
1 INJECTION, POWDER, FOR SOLUTION INTRAMUSCULAR; INTRAVENOUS EVERY 24 HOURS
Status: DISCONTINUED | OUTPATIENT
Start: 2023-10-17 | End: 2023-10-23 | Stop reason: HOSPADM

## 2023-10-17 RX ADMIN — SEVELAMER CARBONATE 800 MG: 800 TABLET, FILM COATED ORAL at 17:12

## 2023-10-17 RX ADMIN — APIXABAN 5 MG: 5 TABLET, FILM COATED ORAL at 20:29

## 2023-10-17 RX ADMIN — MIDODRINE HYDROCHLORIDE 10 MG: 5 TABLET ORAL at 17:12

## 2023-10-17 RX ADMIN — CEFEPIME HYDROCHLORIDE 1 G: 1 INJECTION, POWDER, FOR SOLUTION INTRAMUSCULAR; INTRAVENOUS at 13:40

## 2023-10-17 RX ADMIN — LACOSAMIDE 50 MG: 50 TABLET, FILM COATED ORAL at 08:55

## 2023-10-17 RX ADMIN — MIDODRINE HYDROCHLORIDE 10 MG: 5 TABLET ORAL at 12:08

## 2023-10-17 RX ADMIN — TACROLIMUS 0.5 MG: 5 CAPSULE ORAL at 17:18

## 2023-10-17 RX ADMIN — OXYCODONE HYDROCHLORIDE 2.5 MG: 5 TABLET ORAL at 12:20

## 2023-10-17 RX ADMIN — SEVELAMER CARBONATE 800 MG: 800 TABLET, FILM COATED ORAL at 06:36

## 2023-10-17 RX ADMIN — MIDODRINE HYDROCHLORIDE 10 MG: 5 TABLET ORAL at 08:55

## 2023-10-17 RX ADMIN — LACOSAMIDE 100 MG: 50 TABLET, FILM COATED ORAL at 20:29

## 2023-10-17 RX ADMIN — SEVELAMER CARBONATE 800 MG: 800 TABLET, FILM COATED ORAL at 12:08

## 2023-10-17 RX ADMIN — MELATONIN TAB 3 MG 3 MG: 3 TAB at 21:23

## 2023-10-17 RX ADMIN — ACETAMINOPHEN 650 MG: 325 TABLET, FILM COATED ORAL at 06:59

## 2023-10-17 RX ADMIN — GUAIFENESIN 600 MG: 600 TABLET, EXTENDED RELEASE ORAL at 08:55

## 2023-10-17 RX ADMIN — Medication 1 CAPSULE: at 08:55

## 2023-10-17 RX ADMIN — GUAIFENESIN 600 MG: 600 TABLET, EXTENDED RELEASE ORAL at 20:29

## 2023-10-17 RX ADMIN — ACETAMINOPHEN 650 MG: 325 TABLET, FILM COATED ORAL at 12:20

## 2023-10-17 RX ADMIN — GABAPENTIN 300 MG: 300 CAPSULE ORAL at 21:23

## 2023-10-17 RX ADMIN — TACROLIMUS 1 MG: 5 CAPSULE ORAL at 08:56

## 2023-10-17 RX ADMIN — APIXABAN 5 MG: 5 TABLET, FILM COATED ORAL at 08:56

## 2023-10-17 RX ADMIN — PANTOPRAZOLE SODIUM 40 MG: 40 TABLET, DELAYED RELEASE ORAL at 06:36

## 2023-10-17 RX ADMIN — SEVELAMER CARBONATE 800 MG: 800 TABLET, FILM COATED ORAL at 20:29

## 2023-10-17 ASSESSMENT — ACTIVITIES OF DAILY LIVING (ADL)
ADLS_ACUITY_SCORE: 29

## 2023-10-17 NOTE — PROGRESS NOTES
Started ceftriaxone after reviewing fluid analysis from paracentesis with neutrophil more than 250( 3100)

## 2023-10-17 NOTE — PROVIDER NOTIFICATION
MD Notification    Notified Person: MD    Notified Person Name: Omid Rockwell    Notification Date/Time: 10/16/23 1958H    Notification Interaction: Vocera    Purpose of Notification: FYI Absolute Neutrophil 3,190.5 ( Paracentesis fluid). Thanks!    Orders Received: Thanks started Abx     Comments:

## 2023-10-17 NOTE — PROGRESS NOTES
[facility]    Medicine Progress Note - Hospitalist Service    Date of Admission:  10/5/2023  Date of Service: 10/17/2023    Assessment & Plan     Blanco Osborne is a 58 year old male with extensive medical history that includes liver transplant in 2016 due to alcoholic liver disease, now with progressive cirrhosis and ascites, paroxysmal atrial fibrillation, on anticoagulation with Eliquis, diabetes mellitus type 2, previous CVA, hypertension, CHRISTIAN on BiPAP, chronic pericardial effusion, ESRD on hemodialysis who presented on 10/5/2023 with palpitations and chest discomfort.       He was hypotensive and had wide-complex tachycardia felt to be atrial fibrillation with aberrancy.  He failed 2 attempts of emergent cardioversion.  He was started on amiodarone and subsequently converted to sinus rhythm. He is now on po amiodarone     He received IV fluids but remained hypotensive and subsequently received pressors that were discontinued on 10/7. He had severe sepsis likely due to SBP and pneumonia. Blood and ascitic fluid cultures were negative. He was intially on vancomycin and zosyn. Vancomycin was discontinued on 10/8.     Troponins were elevated, felt to be demand ischemia.  Echo showed pericardial effusion without tamponade.  CT C/A/P showed PE and pulmonary infiltrates.  He was started on IV heparin.      Acute hypoxic respiratory failure-Resolved - multifactorial  -Clinically patient is on room air since yesterday night and was a combination of likely pneumonia versus fluid overload    Severe sepsis with septic shock - due to multifactorial pneumonia -resolved  Chronic hypotension, on midodrine  -Is normally on midodrine for chronic hypotension    -Initially presented with septic shock which has now resolved.  Off pressors since 10/7/2023.  Blood pressure currently in normal range  -Continue midodrine for chronic hypotension and of note patient on nondialysis takes it on  as needed  basis and after he was transferred from ICU he has been on scheduled 15 three times daily and in spite of that he does get hypotensive at times  -Note patient has been afebrile since he has been in the hospital, has completed 8 days of antibiotics, WBC count continues to be stable, no SBP on the ascitic fluid but in spite of that he continues to have lower blood pressure and he is on very high dose of midodrine as started by the ICU team  -Random cortisol level checked on 10/13 have been normal  -Status 25 g of 25% albumin on 10/14  -Dose of midodrine was cut down to 10 mg 3 times daily scheduled on 10/14 evening  -Infectious disease team was consulted yesterday and they wanted to see if patient can be transferred to [facility] currently they do not have beds and I did talk to transplant ID team as below  -We will monitor how his blood pressures are during the course of the day and patient only takes midodrine on as-needed basis and does take it for sure on days of dialysis    Infectious disease   -Blood cultures on 10/6/2023 have been negative  -C. difficile has been negative during this hospital stay  -Ascitic tap was done on 10/6 and aerobic, anaerobic have been negative and fungal or yeast have been negative so far and is predominantly lymphocytic and triglycerides are high and negative for malignancy  -Ascites tap on 10/10 the aerobic cultures are negative and is predominantly lymphocytic  -Pericardial tap on 10/11-negative for malignancy, Gram stain is negative, aerobic bacterial culture is negative  -Ascitic tap on 10/12-predominantly lymphocytic and triglycerides, flow cytometry shows polytypic B cells, aerobic cultures have not shown any growth  - He was checked for AFB in the ascitic fluid in January 2023  - Case discussed with transplant hepatology and noted transplant ID's request to transfer pt to [facility]. Working on transfer  - Continue cefepime per ID   - respiratory viral panel is  negative  - Strongyloides antibody ordered and pending rest  - No history of pets, exposure to ticks or exotic travel  - CMV is negative     End-stage liver disease, s/p liver transplant in 2016, now with recurrent ascites and Portal hypertension likely due to cirrhosis(status biopsy on 12/22 which showed poorly preserved liver with evidence of cirrhosis  s/p paracentesis on 10/6 and 10/11  Chylous ascites/ effusion  -On presentation on admission patient was found to have effusion and paracentesis was done and the there were 1119 nucleated cells and triglycerides were elevated at more than 400 and it was chylous effusion and patient has been treated with IV Zosyn for presumed SBP  - his differential count from 10/6 shows predominantly lymphocytes(77 per cent) and ANC is only 11.2 and he does not have SBP and his cultures have been negative including aerobic, anaerobic and fungal  -Status repeat paracentesis on 10/10 and 4.6 L of fluid was removed and triglycerides are elevated at 503, chylous effusion, local cultures again are negative and cell count  showed 761 nucleated cells with 73% lymphocytes, 22% monocytes and ANC of 26.6  -GI team from [place] has been following the patient and they recommended right heart cath his concern was that his ascites started at the time when he had cardiopulmonary symptoms and If his pressures are not elevated then future test may include transjugular liver biopsy and or hepatic vein wedge pressure measurement and that decision will be up to the transplant team of Dr. Simms and they are in touch with him  -right heart cath on 10/11 which shows normal right and left-sided filling pressures, mild elevated pulmonary pressures and portal hypertension with hepatic vein pressure gradient of 10 mmHg  -Most likely cause of portal hypertension is cirrhosis as evident on biopsy done on 12/22  - 10/12-10/12-Tacrolimus levels were high and transplant hepatology has been managing his  tacrolimus    -As per GI team note dated 10/13 on next Repeat the sciatic studies including triglyceridesTG (to evaluate if improved post restriction to low fat diet), cell count with diff, cultures, Alb, T.PRO.  -If TG improved on next paracentesis, would continue very low fat (LCT) diet for 2-3 weeks then liberalize fat restriction for LCT challenge and repeat TG on next paracentesis to evaluate if chylous ascites persists.  -If chylous ascites persists despite compliance to low fat diet, consider IR consult for lymphoscintigraphy (possibly with SPECT CT) to locate site of lymphatic leak for embolization of leak.   -No MCT supplementation in the setting of Cirrhosis (high risk for narcosis)   -Calories count  -Nutrition consulted  -10/14-d/w with Dr. Leventhal from transplant hepatology and he did start the patient back on tacrolimus at 1 mg and 0.5 mg and repeat levels to be done on Monday or Tuesday and I did discuss albumin and he generally prefers 25% given that volume is less  -10/16 -> repeat paracentesis with ascites studies: triglcyerides, AFB culture and stain, fungal cultures, cell count with diff, cultures, albumin and total protein. Give 25g IV albumin given symptomatic hypotension following HD and paracentesis    - Dscussed with transplant hepatology and noted transplant ID's request to transfer pt to [facility]. Working on transfer     Multifocal pneumonia, organism unspecified  - CT scan showed pulmonary infiltrates   - COVID 19/influenza/RSV negative  - blood cultures negative till date from 10/5  - unable to provide sputum specimen  -vancomycin was discontinued on 10/8  -Patient received total of 7 to 8 days of IV Zosyn and then another day Augmentin and it was discontinued with last dose on 10/13    Diarrhea-improved  -C. difficile has been negative on 10/10     Pulmonary embolism in segmental and subsegmental pulmonary arteries of left upper lobe, without cor pulmonale -  Short segment   thrombophlebitis in right cephalic vein at antecubital fossa, likely related to prior intravenous access  -Eliquis held on admission and was started on IV heparin  -Continue with Eliquis which was restarted on     Recurrent chronic pericardial effusion, s/p pericardiocentesis on 9/29.  Drain was removed on 9/30.   Status repeat pericardiocentesis on 10/11  - Felt to be due to volume overload due to renal failure   -  TTE 10/7 showed moderate pericardial effusion, slightly larger than seen on 10/5, with no evidence of tamponade physiology  - echo on 10/9 showed moderate to large bloody cardial effusion and as compared to 10/7 it has increased, IVC is mildly dilated, mild RV diastolic collapse and has inflow velocity variation consistent with hemodynamic compromise  -Status repeat pericardiocentesis with removal of 560 mL of fluid and it was bloody, red and there were 2558 nucleated cells, no organisms, 2 WBCs, glucose of 79, total protein of 4.9 and albumin level of 2.7 and ADA is pending along with cytology and he had post procedure repeat echo which showed excellent results after removal of fluid  -Drain was removed on 10/12  -Repeat echo as per cardiology -> Repeat echocardiogram tomorrow   -If patient has repeat pericardiocentesis in future then sent for AFB/fungal culture, 16 S and 28 S    Recurrent chest discomfort  -He has been having on and off chest discomfort and initially during the course of hospitalization his troponin did peak at 346 on 10/5 and it was thought to be due to demand because of shock and EKG did not had any ST elevation and patient had rapid response yesterday and repeat troponin yesterday showed troponins have trended down to 307  -Echo as above  - discussed with cardiology and as per them his chest discomfort was likely due to pericardial effusion  -In case in future he has recurrent chest discomfort he will need ischemic work-up  -Patient again and had episode of chest discomfort on  10/14 and was present on palpation and is similar to prior episodes and troponin was checked and was 320 and a repeat this morning is similar and has trended down and EKG did not show any ST elevation  -Discussed with the cardiology team and we will have them see him again tomorrow     Chronic paroxysmal atrial fibrillation, on anticoagulation with Eliquis  Wide-complex tachycardia on admission felt to be atrial fibrillation with aberrant conduction, failed cardioversion x 2  -Patient was started on amiodarone and converted to normal sinus rhythm and is currently on oral amiodarone  -Family did wanted to discuss about ongoing use of amiodarone as it has a lot of side effects and can interfere with tacrolimus levels and we will consult cardiology team again for tomorrow morning     ESRD, on hemodialysis q. Monday, Wednesday, Friday  S/p left AV fistula pseudoaneurysm repair, 8/2023  His AV fistula on the left upper extremity infiltrated on 10/6-  - continue dialysis per nephrology and continue with renal multivitamin and sevelamer        Small bilateral pleural effusions  Chronically loculated right pleural effusion  - stable         Generalized anxiety disorder  Restless leg syndrome  -On hydroxyzine and ropinirole        Chronic anemia-due to anemia of chronic disease  -Hemoglobin is baseline between 8-9  -Hemoglobin is stable today at 7.9 on 10/15  -We will check labs intermittently     chronic thrombocytopenia  -Platelet count this morning is 145    Seizure disorder  - continue Vimpat     GERD  -Continue PPI     Probable CHRISTIAN  --Per patient's wife, he had a sleep study about 10 years ago but does not have any CPAP or BiPAP at home  -Did use BiPAP last night  -Need to  undergo sleep study as outpatient after discharge     Previous embolic strokes     Generalized weakness  -Continue with physical therapy and Occupational Therapy.     Pain at multiple locations  -Has pain in left upper extremity due to infiltration  of AV fistula, pain in right upper extremity due to thrombophlebitis and frequent lab draws.  Also complains of diffuse pain in shoulders and upper back  -continue Tylenol as needed  -IV Dilaudid 0.2 mg every 6 hours as needed and Percocet every 8 hours as needed     Plan for 10/16  -Hemodialysis as per nephrology  -Touch base with GI team regarding tacrolimus dose  -       Diet: Snacks/Supplements Adult: Gelatein Plus; Between Meals  Room Service  Snacks/Supplements Adult: Other; pt may order diet-appropriate supplements (gelatein or ensure clear) PRN; With Meals  Low Saturated Fat Na <2400 mg    DVT Prophylaxis: Heparin drip  Nixon Catheter: Not present  Lines: PRESENT      Hemodialysis Vascular Access Right Subclavian-Site Assessment: WDL  Hemodialysis Vascular Access Arteriovenous fistula Left Forearm-Site Assessment: WDL;Bruit present;Thrill present      Cardiac Monitoring: ACTIVE order. Indication: Tachyarrhythmias, acute (48 hours)  Code Status: Full Code      Clinically Significant Risk Factors              # Hypoalbuminemia: Lowest albumin = 2.8 g/dL at 10/17/2023  6:33 AM, will monitor as appropriate  # Coagulation Defect: INR = 2.00 (Ref range: 0.85 - 1.15) and/or PTT = 47 Seconds (Ref range: 22 - 38 Seconds), will monitor for bleeding        # Hypertension: Noted on problem list        # Overweight: Estimated body mass index is 25.44 kg/m  as calculated from the following:    Height as of this encounter: 1.829 m (6').    Weight as of this encounter: 85.1 kg (187 lb 9.6 oz).     # Moderate Malnutrition: based on nutrition assessment           Disposition Plan      Expected Discharge Date: 10/19/2023      Destination: home with family;inpatient rehabilitation facility  Discharge Comments: 10-11 angio and pericardiocentesis and HD          Franky Monet MD  Hospitalist Service  [facility]  Securely message with [employer]. (more info)  Text page via [employer] Paging/Directory    _  _____________________________________________________________________    Interval History     No acute events overnight  Abx started overnight for SBP  He denies any lightheadedness or dizziness.    No CP/SOB  No fevers  Working on transfer to [facility]    Physical Exam     Temp: 98.2  F (36.8  C) Temp src: Oral BP: (!) 88/55 Pulse: 68   Resp: 18 SpO2: 94 % O2 Device: None (Room air)           General: Patient appears comfortable and in no acute distress.  Respiratory: Lungs are clear to auscultation bilaterally with no wheeze or crackles and has right-sided permacath  Cardiovascular: Regular rate , S1 and S2 normal with no murmer or rubs or gallops  Abdomen:   soft , non tender , mild distended distended and bowel sound present   Skin: No skin rashes   Neurologic:  No facial droop  Musculoskeletal: Normal Range of motion over upper and lower extremities bilaterally   Psychiatric: cooperative          Medical Decision Making       Time spent in care of patient is 50 minutes and I discussed plan of care with the patient, nurse , and discussed plan with the patient's brother and the family     Data     I have personally reviewed the following data over the past 24 hrs:    7.6  \   7.9 (L)   / 133 (L)     132 (L) 94 (L) 47.8 (H) /  99   4.7 22 5.68 (H) \     ALT: 7 AST: 14 AP: 142 (H) TBILI: 0.5   ALB: 2.8 (L) TOT PROTEIN: 5.9 (L) LIPASE: N/A     INR:  2.00 (H) PTT:  N/A   D-dimer:  N/A Fibrinogen:  N/A       Imaging results reviewed over the past 24 hrs:   Recent Results (from the past 24 hour(s))   US Paracentesis with Albumin    Narrative    US PARACENTESIS WITH ALBUMIN 10/16/2023 3:24 PM     HISTORY: Status post liver tx now with cirrhosis with portal  htn/ascites, recent para with chylous ascites    FINDINGS: Ultrasound was used to evaluate for the presence and best  approach for paracentesis. Written and oral informed consent was  obtained. A pause for the cause procedure to verify the correct  patient and  correct procedure. The skin overlying the right upper  quadrant was prepped and draped in the usual sterile fashion. The  subcutaneous tissues were anesthetized with 10 mL 1% lidocaine. A  catheter was advanced into the peritoneal space and 2 L (max) of   straw colored fluid was drained. There were no immediate  complications. Ultrasound images were permanently stored.  Patient  left the ultrasound suite in satisfactory condition.      Impression    IMPRESSION: Technically successful paracentesis without immediate  complications.    JOSEPH SAMANO MD         SYSTEM ID:  [id number]

## 2023-10-17 NOTE — PROGRESS NOTES
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 2016 now with progressive cirrhosis (liver bx 12/2022) with evidence of portal hypertension and ascites, paroxysmal atrial fibrillation on Eliquis, DM type 2, history of CVA, hypertension, CHRISTIAN on BiPAP, chronic pericardial effusion, ESRD on dialysis who presented to [facility] ED on 10/5/23 with chest discomfort and palpitations, admitted with severe sepsis with septic shock requiring pressors secondary to multifactorial pneumonia, atrial fibrillation with aberrancy s/p 2 failed attempts at cardioversion, worsening pericardial effusion on ECHO s/p pericardiocentesis 10/11, and PE started on IV heparin. GI consulted for \"liver transplant, developing portal hypertension, initial tap concerning for sbp\".     # Cirrhosis secondary to NAFLD s/p liver transplant 2016 now with progressive cirrhosis with evidence of portal hypertension and ascites   # SBP   # Chylous ascites   # Abdominal pain   - Patient follows with Dr. Simms, though notably has not been seen in years; last clinic visit 4/21/2020 with plan for 6 month follow up. Next appt 12/2023   - Patient presented with palpitations and chest discomfort but also noted worsening abdominal distention and pain that began around the same time. He has required paracentesis in the past but stated this was back in 2016.    - PTA on tacrolimus 1mg q 12 hours. Tacro level supratherapeutic at 8 on 10/11 (desired levels 3-5), held initially then resumed at 1mg in the morning and 0.5mg in the evening. Tacro level 4.2 10/15, level not collected 10/16. Tacro level pending.   - Admit labs with normal LFTs other than mildly elevated alk phos which is elevated again but improving 142. INR 2.00 (1.55 on admission). WBC normal. Liver bx 12/2022 " with evidence of cirrhosis, but notably the sample was autolyzed with delayed fixation.   - Paracentesis   - 10/6: 3L removed with 1119 nucleated cells, triglycerides >400 consistent with chylous ascites. SAAG 1.7 consistent with portal hypertension. Cytology negative for malignancy. Cultures are negative including aerobic culture, anaerobic culture and fungal.   - 10/10: 4.65L removed with 761 total nucleated cells, SAAG 3.4. Cultures negative. Triglycerides 503  - 10/12: 3.4L removed with 719 total nucleated cells, SAAG 2.8. Cultures negative. Cytology negative for malignancy. Flow cytometry with polytypic B cells, triglycerides 303, lipase 37.   - 10/16: 2L max removed with 3,844 total nucleated cells, SAAG 2.1, ANC 3,190, prelim aerobic cultures NGTD, AFB culture and stain pending, fungal culture pending, triglycerides pending.   - Antibiotics: Zosyn (10/5-10/12) for pneumonia and possible SBP (no evidence of SBP on peritoneal fluid analysis 10/6), Augmentin (10/12-10/13) for pneumonia, Ceftriaxone for SBP (10/16 x1) switched to cefepime per ID (10/17- present)  - Quantiferon gold TB negative, HIV negative, EBV pending, CMV negative, respiratory panel negative, strongyloides ab IgG negative  - s/p RHC 10/11 which showed normal right and left sided filling pressures, mild elevated pulmonary pressures and portal hypertension with hepatic vein pressure gradient of 10mmHg      - Ascites likely related to portal HTN in the setting of cirrhosis, further c/b disruption of lymphatics (d/t increased lymph flow and portal HTN seen with cirrhosis) leading to chylous ascites as reflective of persistent TG >200 in ascites fluid.  Currently on 2gm NA and very low fat diet. He continues to have ongoing TG elevations in ascites and requiring paracentesis every 2-4 days for relief of abd distention. Suspect ascites re-accumulation exacerbated by chyle formation. High risk for developing malnutrition (PRO losses) and further  immunocompromise with chyle losses. RD following. Would avoid MCT supplementation in the setting of cirrhosis (increased risk for narcosis/altering mentation).      # Moderate protein-energy malnutrition  - RD following for calorie counts, education on low fat diet, and assistance with increasing PRO/kcals      # Diarrhea, resolved  - Loose stools began 10/9, per nursing charting patient having 1-2 stools per day   - No hx of Cdiff, C diff negative 10/10  - Treated with Zosyn 10/5-10/12, Augmentin 10/12-10/13 and given one dose of vancomycin 10/5  - Could be secondary to bowel edema/poor absorption from portal hypertension, but likely antibiotics as diarrhea has resolved when antibiotics were stopped      # Recurrent pericardial effusion  # Paroxysmal atrial fibrillation   # NSTEMI  - PTA on Eliquis   - Cardiology following  - ECHO 10/10 with recurrent pericardial effusion slightly larger than seen on ECHO 10/8 s/p pericardiocentesis 10/11 with 560cc removed   - s/p RHC 10/11 which showed normal right and left sided filling pressures, mild elevated pulmonary pressures and portal hypertension with hepatic vein pressure gradient of 10mmHg   - Repeat ECHO 10/17 pending     # Pulmonary embolism  - Noted on CT scan   - Treated with heparin gtt, now on Eliquis BID      # ESRD on HD  - Nephrology following, HD on MWF  - On kidney transplant list       RECOMMENDATIONS:  - Discussed with transplant hepatology and noted transplant ID's request to transfer pt to [facility]. Discussed with hospitalist who will initiate transfer.   - Continue cefepime per ID   - Await pending labs/fluid analysis   -If TG improved on next paracentesis, would continue very low fat (LCT) diet for 2-3 weeks then liberalize fat restriction for LCT challenge and repeat TG on next paracentesis to evaluate if chylous ascites persists.  -If chylous ascites persists despite compliance to low fat diet, consider IR consult for lymphoscintigraphy (possibly with  SPECT CT) to locate site of lymphatic leak for embolization of leak.   - Follow tacrolimus drug levels; Discussed with Jcaklyn Liu, transplant hepatology CNP on 10/16  -If drug level 3-5, continue Tacrolimus and continue to check level M/W/F to monitor levels.                -If drug level >5, would continue Tacrolimus at decreased dose of 0.5mg BID and recheck level daily until within therapeutic range.  - If hypotensive and sx orthostasis post paracentesis or HD runs, would rec give IV Alb, 12.5-25 g (planning to give 25g with paracentesis today)  - Appreciate RD following, continue low fat diet   - Continue PPI     GI will continue to follow     Thank you for involving us in this patient's care. Please do not hesitate to contact the GI service with any questions or concerns.     Pt care plan discussed with Dr. Jang, GI staff physician.    Overall time spent on the date of this encounter preparing to see the patient (including chart review of available notes, clinical status events, imaging and labs); obtaining and/or reviewing separately obtained history; ordering medications, tests or procedures; communicating with other health care professionals; and documenting the above clinical information in the electronic medical record was 60 minutes.    aMrtha Medrano PA-C  GI Service  [facility]  Text Page (Monday-Friday, 8am-4pm)    To page the On-Call [facility] GI provider 24 hours a day, please click [facility] and select GASTROENTEROLOGY MEDICINE ADULT / [facility] in the drop-down menu.   _______________________________________________________________      Subjective: Nursing notes and 24hr events reviewed. Patient reports that he continues to have abdominal pain, though it did get better after the paracentesis yesterday. No nausea, vomiting. He reports he had 2 bowel movements yesterday, no diarrhea. Afebrile. He has been tolerating diet. He is frustrated that he is not able to get up  and walk in the halls by himself.     ROS:   4 pt ROS negative unless noted in subjective.     Medications:  Current Facility-Administered Medications   Medication    - MEDICATION INSTRUCTIONS for Dialysis Patients -    acetaminophen (TYLENOL) tablet 650 mg    Or    acetaminophen (TYLENOL) Suppository 650 mg    apixaban ANTICOAGULANT (ELIQUIS) tablet 5 mg    benzocaine-menthol (CHLORASEPTIC) 6-10 MG lozenge 1 lozenge    calcium carbonate (TUMS) chewable tablet 500 mg    camphor-menthol (DERMASARRA) lotion    cefTRIAXone (ROCEPHIN) 2 g vial to attach to  ml bag for ADULTS or NS 50 ml bag for PEDS    glucose gel 15-30 g    Or    dextrose 50 % injection 25-50 mL    Or    glucagon injection 1 mg    gabapentin (NEURONTIN) capsule 300 mg    guaiFENesin (MUCINEX) 12 hr tablet 600 mg    HYDROmorphone (DILAUDID) injection 0.2 mg    hydrOXYzine (ATARAX) tablet 25 mg    lacosamide (VIMPAT) tablet 100 mg    lacosamide (VIMPAT) tablet 50 mg    melatonin tablet 3 mg    midodrine (PROAMATINE) tablet 10 mg    midodrine (PROAMATINE) tablet 10 mg    multivitamin RENAL (TRIPHROCAPS) capsule 1 capsule    naloxone (NARCAN) injection 0.2 mg    Or    naloxone (NARCAN) injection 0.4 mg    Or    naloxone (NARCAN) injection 0.2 mg    Or    naloxone (NARCAN) injection 0.4 mg    ondansetron (ZOFRAN ODT) ODT tab 4 mg    Or    ondansetron (ZOFRAN) injection 4 mg    oxyCODONE IR (ROXICODONE) half-tab 2.5 mg    pantoprazole (PROTONIX) EC tablet 40 mg    Patient is already receiving anticoagulation with heparin, enoxaparin (LOVENOX), warfarin (COUMADIN)  or other anticoagulant medication    QUEtiapine (SEROquel) half-tab 50 mg    rOPINIRole (REQUIP) tablet 0.5 mg    sevelamer carbonate (RENVELA) tablet 800 mg    tacrolimus (GENERIC) suspension 0.5 mg    tacrolimus (GENERIC) suspension 1 mg       Objective:  Blood pressure (!) 88/55, pulse 68, temperature 98.2  F (36.8  C), temperature source Oral, resp. rate 18, height 1.829 m (6'), weight  85.1 kg (187 lb 9.6 oz), SpO2 94%.  Gen: Lying in bed. Appears comfortable  HEENT: NCAT. Conjunctiva clear. Sclera anicteric    CV: Regular rate  Resp: Non-labored breathing  Abd: Soft, mild generalized tenderness, distended  Skin:  No jaundice  Ext: warm, well perfused   Mental status/Psych: A&O. Occasionally repeats himself        PROCEDURES:  10/16: Paracentesis with 2L (max) of straw colored fluid drained. Technically successful paracentesis without immediate complications. Given 25g albumin    10/12: Paracentesis with 3.4 L of cloudy/light brown fluid drained.  There were no immediate complications.     10/10: Paracentesis with 4.65 L of  straw colored fluid was drained. There were no immediate complications.     10/6: Paracentesis with 3L  cloudy, turbid milky fluid aspirated into vacuum bottles.    LABS:  BMP  Recent Labs   Lab 10/17/23  0633 10/16/23  2228 10/15/23  0747 10/14/23  1042   * 131* 129* 130*   POTASSIUM 4.7 4.4 4.7 4.4   CHLORIDE 94* 92* 91* 93*   KELLY 8.7 8.6 9.1 8.8   CO2 22 23 23 23   BUN 47.8* 41.1* 52.2* 35.3*   CR 5.68* 5.09* 6.38* 4.83*   GLC 99 123* 106* 146*     CBC  Recent Labs   Lab 10/17/23  0633 10/14/23  1042 10/13/23  1155 10/12/23  0644   WBC 7.6 8.4 6.5 6.4   RBC 2.51* 2.51* 2.65* 2.50*   HGB 7.9* 7.9* 8.3* 8.0*   HCT 25.0* 25.2* 25.9* 24.8*    100 98 99   MCH 31.5 31.5 31.3 32.0   MCHC 31.6 31.3* 32.0 32.3   RDW 18.0* 16.6* 15.8* 15.4*   * 145* 143* 128*     INR  Recent Labs   Lab 10/17/23  0633   INR 2.00*     LFTs  Recent Labs   Lab 10/17/23  0633 10/16/23  2228 10/15/23  0747 10/14/23  1042   ALKPHOS 142* 158* 164* 142*   AST 14 13 12 12   ALT 7 6 6 7   BILITOTAL 0.5 0.5 0.6 0.5   PROTTOTAL 5.9* 6.3* 6.4 6.1*   ALBUMIN 2.8* 3.2* 3.0* 2.9*      PANC  Recent Labs   Lab 10/13/23  0850   LIPASE 24     CULTURES:   7-Day Micro Results       Collected Updated Procedure Result Status      10/16/2023 1511 10/16/2023 1941 Cell count with differential fluid  [[id number]]    (Abnormal)   Ascites Fluid from Paracentesis    Final result Component Value   No component results            10/16/2023 1511 10/16/2023 1604 Albumin fluid [[id number]]    Ascites Fluid from Paracentesis    Final result Component Value Units   Albumin Fluid Source Abdomen    ascites  ascites   Albumin fluid 1.1 g/dL            10/16/2023 1511 10/16/2023 1604 Protein fluid [[id number]]    Ascites Fluid from Paracentesis    Final result Component Value Units   Protein Fluid Source Abdomen    ascites  ascites  ascites   Protein Total Fluid 2.6 g/dL            10/16/2023 1511 10/16/2023 1520 Ascites Fluid Aerobic Bacterial Culture Routine [[id number]]   Ascites Fluid from Abdomen    In process Component Value   No component results               10/16/2023 1511 10/16/2023 1520 Acid-Fast Bacilli Culture and Stain [[id number]]    Ascites Fluid from Abdomen    In process Component Value   No component results            10/16/2023 1511 10/16/2023 1520 Fungal or Yeast Culture Routine [[id number]]   Ascites Fluid from Abdomen    In process Component Value   No component results               10/16/2023 1511 10/16/2023 1938 Cell Count Body Fluid [[id number]]    (Abnormal)   Ascites Fluid from Paracentesis    Final result Component Value Units   Color Orange    Clarity Turbid    Cell Count Fluid Source Abdomen    ascites  ascites   Total Nucleated Cells 3,844 /uL            10/16/2023 1511 10/16/2023 1520 Acid-Fast Bacilli Culture and Stain [[id number]]   Ascites Fluid from Abdomen    In process Component Value   No component results            10/16/2023 1511 10/16/2023 1941 Differential Body Fluid [[id number]]    (Abnormal)   Ascites Fluid from Paracentesis    Final result Component Value Units   % Neutrophils 83 %   % Lymphocytes 7 %   % Monocyte/Macrophages 10 %   Absolute Neutrophils, Body Fluid 3,190.5 /uL            10/14/2023 2101 10/15/2023 1100 Cytomegalovirus DNA by PCR, Quantitative  [[id number]]    Blood from Arm, Right    Final result Component Value Units   CMV DNA IU/mL Not Detected IU/mL            10/14/2023 1808 10/14/2023 2211 Respiratory Panel PCR [[id number]]    Swab from Nasopharyngeal    Final result Component Value   Adenovirus Not Detected   Coronavirus Not Detected   This test detects Coronavirus 229E, HKU1, NL63 and OC43 but does not distinguish between them. It does not detect MERS ( Respiratory Syndrome), SARS (Severe Acute Respiratory Syndrome) or 2019-nCoV (Novel 2019) Coronavirus.   Human Metapneumovirus Not Detected   Human Rhin/Enterovirus Not Detected   Influenza A Not Detected   Influenza A, H1 Not Detected   Influenza A 2009 H1N1 Not Detected   Influenza A, H3 Not Detected   Influenza B Not Detected   Parainfluenza Virus 1 Not Detected   Parainfluenza Virus 2 Not Detected   Parainfluenza Virus 3 Not Detected   Parainfluenza Virus 4 Not Detected   Respiratory Syncytial Virus A Not Detected   Respiratory Syncytial Virus B Not Detected   Chlamydia Pneumoniae Not Detected   Mycoplasma Pneumoniae Not Detected            10/13/2023 1155 10/16/2023 1307 Quantiferon TB Gold Plus Grey Tube [[id number]]   Peripheral Blood    Final result Component Value Units   Quantiferon Nil Tube 0.00 IU/mL            10/13/2023 1155 10/16/2023 1307 Quantiferon TB Gold Plus Green Tube [[id number]]   Peripheral Blood    Final result Component Value Units   Quantiferon TB1 Tube 0.00 IU/mL            10/13/2023 1155 10/16/2023 1308 Quantiferon TB Gold Plus Yellow Tube [[id number]]   Peripheral Blood    Final result Component Value   Quantiferon TB2 Tube 0.00            10/13/2023 1155 10/16/2023 1308 Quantiferon TB Gold Plus Purple Tube [[id number]]   Peripheral Blood    Final result Component Value Units   Quantiferon Mitogen 0.60 IU/mL            10/13/2023 1155 10/16/2023 1317 Quantiferon TB Gold Plus [[id number]]   Peripheral Blood    Final result Component Value  Units   Quantiferon-TB Gold Plus Negative    No interferon gamma response to M.tuberculosis antigens was detected. Infection with M.tuberculosis is unlikely, however a single negative result does not exclude infection. In patients at high risk for infection, a second test should be considered in accordance with the 2017 ATS/IDSA/CDC Clinical Pract  ice Guidelines for Diagnosis of Tuberculosis in Adults and Children    TB1 Ag minus Nil Value 0.00 IU/mL   TB2 Ag minus Nil Value 0.00 IU/mL   Mitogen minus Nil Result 0.60 IU/mL   Nil Result 0.00 IU/mL            10/12/2023 1113 10/13/2023 2103 Cell count with differential fluid [[id number]]    (Abnormal)   Ascites Fluid from Paracentesis    Final result Component Value   No component results            10/12/2023 1113 10/12/2023 1235 Albumin fluid [[id number]]    Ascites Fluid from Paracentesis    Final result Component Value Units   Albumin Fluid Source Abdomen    Albumin fluid 1.0 g/dL            10/12/2023 1113 10/12/2023 1235 Protein fluid [[id number]]    Ascites Fluid from Paracentesis    Final result Component Value Units   Protein Fluid Source Abdomen    Protein Total Fluid 2.4 g/dL            10/12/2023 1113 10/17/2023 0724 Ascites Fluid Aerobic Bacterial Culture Routine [[id number]]   Ascites Fluid from Peritoneum    Final result Component Value   Culture No Growth               10/12/2023 1113 10/16/2023 1904 Cytology, non-gynecologic [[id number]]   Ascites Fluid from Abdomen    Final result Component Value   Final Diagnosis This result contains rich text formatting which cannot be displayed here.   Clinical Information This result contains rich text formatting which cannot be displayed here.   Gross Description This result contains rich text formatting which cannot be displayed here.   Microscopic Description This result contains rich text formatting which cannot be displayed here.   Performing Labs This result contains rich text formatting which  cannot be displayed here.            10/12/2023 1113 10/13/2023 2101 Cell Count Body Fluid [[id number]]    (Abnormal)   Ascites Fluid from Paracentesis    Final result Component Value Units   Color Yellow    Clarity Turbid    Body Fluid Comment Slides sent for a path review.    Cell Count Fluid Source Abdomen    Total Nucleated Cells 719 /uL            10/12/2023 1113 10/13/2023 2103 Differential Body Fluid [[id number]]    Ascites Fluid from Paracentesis    Final result Component Value Units   % Neutrophils 1 %   % Lymphocytes 88 %   % Monocyte/Macrophages 11 %   Absolute Neutrophils, Body Fluid 7.2 /uL            10/12/2023 1113 10/13/2023 1336 Triglyceride Fluid [[id number]]    Ascites Fluid from Paracentesis    Final result Component Value Units   Triglyceride Fluid Source --    Abdomen   Triglyceride Fluid 303 mg/dL            10/12/2023 1113 10/13/2023 1332 Lipase fluid [[id number]]    Ascites Fluid from Paracentesis    Final result Component Value Units   Lipase Fluid Source --    Abdomen   Lipase fluid 37 U/L            10/12/2023 1113 10/13/2023 1402 Bilirubin fluid [[id number]]    Ascites Fluid from Paracentesis    Final result Component Value Units   Bilirubin Fluid Source Abdomen    Bilirubin fluid 0.3 mg/dL            10/11/2023 1355 10/11/2023 1503 Albumin fluid [[id number]]    Pericardial Fluid from Pericardium    Final result Component Value Units   Albumin Fluid Source Pericardium    Albumin fluid 2.7 g/dL            10/11/2023 1355 10/11/2023 1719 Cell count with differential fluid [[id number]]    (Abnormal)   Pericardial Fluid from Pericardium    Final result Component Value   No component results            10/11/2023 1355 10/13/2023 1655 Cytology non gyn [[id number]]   Pericardial Fluid from Pericardium    Final result Component Value   Final Diagnosis This result contains rich text formatting which cannot be displayed here.   Clinical Information This result contains rich text  formatting which cannot be displayed here.   Gross Description This result contains rich text formatting which cannot be displayed here.   Microscopic Description This result contains rich text formatting which cannot be displayed here.   Performing Labs This result contains rich text formatting which cannot be displayed here.            10/11/2023 1355 10/16/2023 0738 Pericardial Fluid Aerobic Bacterial Culture Routine [[id number]]   Pericardial Fluid from Pericardium    Final result Component Value   Culture No Growth               10/11/2023 1355 10/11/2023 1715 Gram stain [[id number]]   Pericardial Fluid from Pericardium    Final result Component Value   Gram Stain Result No organisms seen   Gram Stain Result 2+ WBC seen            10/11/2023 1355 10/11/2023 1503 Glucose fluid [[id number]]    Pericardial Fluid from Pericardium    Final result Component Value Units   Glucose Fluid Source Pericardium    Glucose fluid 79 mg/dL            10/11/2023 1355 10/11/2023 1503 Protein fluid [[id number]]    Pericardial Fluid from Pericardium    Final result Component Value Units   Protein Fluid Source Pericardium    Protein Total Fluid 4.9 g/dL            10/11/2023 1355 10/11/2023 1719 Cell Count Body Fluid [[id number]]    (Abnormal)   Pericardial Fluid from Pericardium    Final result Component Value Units   Color Red    Clarity Bloody    Cell Count Fluid Source Pericardium    Total Nucleated Cells 2,558 /uL            10/11/2023 1355 10/11/2023 1719 Differential Body Fluid [[id number]]    Pericardial Fluid from Pericardium    Final result Component Value Units   % Neutrophils 62 %   % Lymphocytes 16 %   % Monocyte/Macrophages 18 %   % Eosinophils 4 %            10/10/2023 1711 10/10/2023 2030 C. difficile Toxin B PCR with reflex to C. difficile Antigen and Toxins A/B EIA [[id number]]    Stool from Per Rectum    Final result Component Value   C Difficile Toxin B by PCR Negative   A negative result does not  exclude actual disease due to C. difficile and may be due to improper collection, handling and storage of the specimen or the number of organisms in the specimen is below the detection limit of the assay.            10/10/2023 1313 10/12/2023 2238 Cell count with differential fluid [[id number]]    (Abnormal)   Ascites Fluid from Paracentesis    Final result Component Value   No component results            10/10/2023 1313 10/10/2023 1438 Albumin fluid [[id number]]    Ascites Fluid from Paracentesis    Final result Component Value Units   Albumin Fluid Source Abdomen    Albumin fluid 1.3 g/dL            10/10/2023 1313 10/10/2023 1438 Protein fluid [[id number]]    Ascites Fluid from Paracentesis    Final result Component Value Units   Protein Fluid Source Abdomen    Protein Total Fluid 3.0 g/dL            10/10/2023 1313 10/15/2023 0643 Ascites Fluid Aerobic Bacterial Culture Routine [[id number]]   Ascites Fluid from Peritoneum    Final result Component Value   Culture No Growth               10/10/2023 1313 10/12/2023 2238 Cell Count Body Fluid [[id number]]    (Abnormal)   Ascites Fluid from Paracentesis    Final result Component Value Units   Color Pink    Clarity Turbid    Cell Count Fluid Source Abdomen    Total Nucleated Cells 761 /uL            10/10/2023 1313 10/12/2023 2238 Differential Body Fluid [[id number]]    Ascites Fluid from Paracentesis    Final result Component Value Units   % Neutrophils 4 %   % Lymphocytes 73 %   % Monocyte/Macrophages 22 %   % Eosinophils 1 %   % Lining Cells 1 %   % Other Cells 1 %   Absolute Neutrophils, Body Fluid 26.6 /uL                     IMAGING:  CT Chest PE, A/P on 10/5/23  IMPRESSION:  1.  Large pericardial effusion. This measures 25 mm in greatest depth which is suggestive of at least 500 mL of fluid.  2.  Pulmonary arteries are increased in size with the main pulmonary artery measuring 39 mm. This is consistent with pulmonary arterial hypertension.  Segmental and subsegmental pulmonary artery emboli are seen to the left upper lobe. No definite right   heart strain.  3.  Small bilateral pleural effusions with the right effusion appearing chronically loculated with pleural thickening. Bilateral lung consolidations right greater than left suspicious for multifocal pneumonia.  4.  Prior liver transplant. Multiple upper abdominal collaterals and splenomegaly suggestive of portal hypertension, with small to moderate abdominopelvic ascites.  5.  Prominent gas and fluid-filled loops of small and large bowel likely reactive ileus.     US LE on 10/5/23  IMPRESSION:   The right CFV and GSV are unable to be assessed due to the existing  arterial line. Exam is otherwise negative for deep vein thrombosis  bilaterally.     US RUE on 10/5/23  IMPRESSION:   1. Negative for right upper extremity DVT.  2. Short segment DVT in the right cephalic vein at the antecubital  fossa, which may be related to prior intravenous access.     US LUE on 10/5/23  IMPRESSION:  1. Negative for DVT in the left upper extremity.  2. Patent AV fistula with good blood flow volume of 1920 mL/min.  Moderate stenosis in the mid to upper outflow cephalic vein with  diameter reduced to 2.6 mm.     CXR on 10/5/23  IMPRESSION: Cardiac enlargement. Dual-lumen central line tip right atrium. Bibasilar atelectasis or infiltrate and small effusions.

## 2023-10-17 NOTE — PLAN OF CARE
Goal Outcome Evaluation:    10/16/23  1900-0730H    Orientation: A/O 2-3, disoriented to situation and time, with forgetfulness  Activity: SBA to BR  Diet/BS Checks: 2gm Na diet, low fat with room service  Tele:  NSR with soft SBP, on RA  IV Access/Drains: PIV SL int with antibiotic  Pain Management: Tylenol PO PRN given 2x  Abnormal VS/Results: Neutrophil 3190.5- MD notified see note  Bowel/Bladder: Bowel cont/anuric - on HD MWF  Skin/Wounds: scattered bruising  Consults: Cardio/ID/PT/OT/Nephro/SW/GI  D/C Disposition: pending  Other Info: Poss echo this morning.

## 2023-10-17 NOTE — PROGRESS NOTES
Renal Medicine       Following for ESRD  Dialyzed yesterday without issue    UF 2 liter range    No current O2 requirement     Next dialysis 10/18/23  Orders entered        Recent Labs   Lab 10/17/23  0633   *   POTASSIUM 4.7   CHLORIDE 94*   CO2 22   ANIONGAP 16*   GLC 99   BUN 47.8*   CR 5.68*   GFRESTIMATED 11*   KELLY 8.7     Recent Labs   Lab 10/17/23  0633 10/16/23  2228   POTASSIUM 4.7 4.4           MIKEL Reilly    [facility]  [phone]

## 2023-10-17 NOTE — PLAN OF CARE
Goal Outcome Evaluation:     O rientation: A+0x4, Forgetful  Activity: SBA  Diet/BS Checks: Low sat fats, 2.4g Na  Tele:  Normal Sinus  IV Access/Drains: RPIV SL,  Pain Management: Oxy x1, tylenol x1  Abnormal VS/Results: AVSS on RA, Blood pressure 88/55. Will monitor.  Bowel/Bladder: Cont of B/B. Minimal UOP  Skin/Wounds: L Fistula, R CVC, L groin, L neck site. Skin tear on L elbow.  Consults: GI, Cardio, Neph, ID  D/C Disposition: Pending Improvement  Other Info: Mepilex for L elbow skin tear changed.

## 2023-10-17 NOTE — PROGRESS NOTES
[facility]  Cardiology Progress Note  Date of Service: 10/17/2023      Assessment & Plan    Blanco Osborne is a 58 year old male admitted on 10/5/2023 with palpitations and chest discomfort.     Assessment:  1.  Recurrent pericardial effusion    - Pericardiocentesis 9/29, follow up echo with trivial effusion   - Echo 10/8 and 10/10 showed increasing size of pericardial effusoin   - Pericardiocentesis 10/11 with 560mL removed   - Right heart catheterization 10/11 demonstrated normal filling pressures, indicating that pericardial effusion, pleural effusion and ascites are not likely due to congestive heart failure.    - Recurrent effusions likely secondary to ESRD and progressive liver disease  2. NSTEMI - troponin 320-315, patient presented with rapid afib without ischemic changes  3. Paroxsymal atrial fibrillation - presented with wide-complex tachycardia felt to be atrial fibrillation s/p DCCV x 2 in ED.    - subsequently converted to sinus on amiodarone, ultimately amiodarone stopped due to risk of liver toxicity with amiodarone outweighs benefits of rhythm stabilization now given medical stabilization. Risk of recurrent atrial fibrillation significantly lower at this point.   4. ESRD on HD MWF   - Nephrology following   - Awaiting renal transplant  5. Pulmonary embolism on CT at time of admission  6. NAFLD s/p liver transplant in 2016, now with recurrent ascites and portal hypertension.    - s/p paracentesis x 3 this admission      Patient underwent echocardiogram today to evaluate for recurrent pericardial effusion, results pending.       Plan:   Limited echo pending   Apixaban 5mg BID    STEFFANY Gonzales CNP  [facility]  Pager: [phone]        Interval History   Denies chest pain and shortness of breath.     Physical Exam   Temp: 98.2  F (36.8  C) Temp src: Oral BP: (!) 88/55 Pulse: 68   Resp: 18 SpO2: 94 % O2 Device: None (Room air)    Vitals:    10/15/23 1639 10/16/23  0707 10/17/23 0705   Weight: 88.9 kg (195 lb 14.4 oz) 89.2 kg (196 lb 9.6 oz) 85.1 kg (187 lb 9.6 oz)       GEN: well nourished, in no acute distress.  HEENT:  Pupils equal, round. Sclerae nonicteric.   NECK: Supple, no masses appreciated.   C/V:  Regular rate and rhythm, no murmur, rub or gallop.    RESP: Respirations are unlabored. Clear to auscultation bilaterally without wheezing, rales, or rhonchi.  GI: Abdomen soft, nontender.  EXTREM: No LE edema.  NEURO: Alert and oriented, cooperative.  SKIN: Warm and dry with area of ecchymosis to right upper extremity    Medications    - MEDICATION INSTRUCTIONS -        - MEDICATION INSTRUCTIONS for Dialysis Patients -   Does not apply See Admin Instructions    apixaban ANTICOAGULANT  5 mg Oral BID    ceFEPIme  1 g Intravenous Q24H    gabapentin  300 mg Oral At Bedtime    guaiFENesin  600 mg Oral BID    lacosamide  100 mg Oral QPM    lacosamide  50 mg Oral QAM    melatonin  3 mg Oral At Bedtime    midodrine  10 mg Oral TID w/meals    multivitamin RENAL  1 capsule Oral Daily    pantoprazole  40 mg Oral QAM AC    sevelamer carbonate  800 mg Oral 4x Daily    tacrolimus  0.5 mg Oral QPM    tacrolimus  1 mg Oral QAM       Data   Last 24 hours labs reviewed     Echo: 10/12/23  Left ventricular systolic function is normal.  The visual ejection fraction is 60-65%.  The right ventricle is normal in structure, function and size.  Valves not visualized on limited study.  Pericardial effusion consists of thick echodense material, maximum dimension  1.0 cm adjacent to anterolaterl wall segment. Minimal free fluid is  demonstrated.

## 2023-10-17 NOTE — PROGRESS NOTES
[facility]    Infectious Disease Progress Note    Date of Service (when I saw the patient): 10/17/2023     Assessment & Plan   Blanco Osborne is a 58 year old who was admitted on 10/5/2023.     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 s/p liver transplant  in 2016 now with progressive cirrhosis  with evidence of portal hypertension   Other PMH include: paroxysmal atrial fibrillation on Eliquis, DM type 2, history of CVA, hypertension, CHRISTIAN on BiPAP, chronic pericardial effusion, ESRD on dialysis '  Presented with chest discomfort and palpitations  Found to have pericardial fluid which was tapped, no positive micro   Peritoneal fluid tapped again no positive micro   Previously asitic fluid has been checked for AFB organism none found   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 Switch to cefepime   Cultures are pending.          Gurwinder Lanza MD    Interval History   No fever   New abdominal pain   Wbc is normal   Back on  antibiotics due to elevated cell count.     Physical Exam   Temp: 98.2  F (36.8  C) Temp src: Oral BP: (!) 88/55 Pulse: 68   Resp: 18 SpO2: 94 % O2 Device: None (Room air)    Vitals:    10/15/23 1639 10/16/23 0707 10/17/23 0705   Weight: 88.9 kg (195 lb 14.4 oz) 89.2 kg (196 lb 9.6 oz) 85.1 kg (187 lb 9.6 oz)     Vital Signs with Ranges  Temp:  [97.5  F (36.4  C)-98.9  F (37.2  C)] 98.2  F (36.8  C)  Pulse:  [43-86] 68  Resp:  [16-18] 18  BP: ()/(51-70) 88/55  SpO2:  [85 %-100 %] 94 %    GENERAL APPEARANCE: frail appearing male   HENT: Mouth without ulcers or lesions  RESP: lungs clear to auscultation   ABDOMEN: distended but soft    MS: extremities normal- no gross deformities noted  SKIN: no suspicious lesions or rashes    Medications    - MEDICATION INSTRUCTIONS -        - MEDICATION INSTRUCTIONS for Dialysis Patients -   Does not apply See Admin Instructions    apixaban ANTICOAGULANT  5 mg Oral BID    cefTRIAXone  2 g Intravenous Q24H    gabapentin  300 mg Oral At Bedtime    guaiFENesin  600 mg Oral BID    lacosamide  100 mg Oral QPM    lacosamide  50 mg Oral QAM    melatonin  3 mg Oral At Bedtime    midodrine  10 mg Oral TID w/meals    multivitamin RENAL  1 capsule Oral Daily    pantoprazole  40 mg Oral QAM AC    sevelamer carbonate  800 mg Oral 4x Daily    tacrolimus  0.5 mg Oral QPM    tacrolimus  1 mg Oral QAM       Data   All microbiology laboratory data reviewed.  Recent Labs   Lab Test 10/17/23  0633 10/14/23  1042 10/13/23  1155   WBC 7.6 8.4 6.5   HGB 7.9* 7.9* 8.3*   HCT 25.0* 25.2* 25.9*    100 98   * 145* 143*     Recent Labs   Lab Test 10/17/23  0633 10/16/23  2228 10/15/23  0747   CR 5.68* 5.09* 6.38*     No lab results found.  Recent Labs   Lab Test 09/28/16  1600 09/23/16  1458 09/20/16  1901   CULT No VRE isolated No growth Culture negative for acid fast bacilli  Assayed at [facility] [address]    Culture negative after 4 weeks  No anaerobes isolated  No growth       All cultures:  Recent Labs   Lab 10/12/23  1113 10/11/23  1355 10/10/23  1313   CULTURE No Growth No Growth No Growth      Blood culture:  Results for orders placed or performed during the hospital encounter of 10/05/23   Blood Culture Peripheral Blood    Specimen: Peripheral Blood   Result Value Ref Range    Culture No Growth    Blood Culture Peripheral Blood    Specimen: Peripheral Blood   Result Value Ref Range    Culture No Growth    Results for orders placed or performed during the hospital encounter of 01/21/23   Blood Culture Arm, Right    Specimen: Arm, Right; Blood   Result Value Ref Range    Culture No  Growth    Blood Culture Peripheral Blood    Specimen: Peripheral Blood   Result Value Ref Range    Culture No Growth    Results for orders placed or performed during the hospital encounter of 12/29/22   Blood Culture Hand, Right    Specimen: Hand, Right; Blood   Result Value Ref Range    Culture No Growth    Blood Culture Hand, Left    Specimen: Hand, Left; Blood   Result Value Ref Range    Culture No Growth    Blood Culture Peripheral Blood    Specimen: Peripheral Blood   Result Value Ref Range    Culture No Growth    Blood Culture Arm, Left    Specimen: Arm, Left; Blood   Result Value Ref Range    Culture No Growth    Results for orders placed or performed during the hospital encounter of 12/16/22   Blood Culture Peripheral Blood    Specimen: Peripheral Blood   Result Value Ref Range    Culture No Growth    Blood Culture Arm, Right    Specimen: Arm, Right; Blood   Result Value Ref Range    Culture No Growth    Results for orders placed or performed during the hospital encounter of 12/16/22   Blood Culture Peripheral Blood    Specimen: Peripheral Blood   Result Value Ref Range    Culture No Growth    Results for orders placed or performed during the hospital encounter of 11/12/22   Blood Culture Line, venous    Specimen: Line, venous; Blood   Result Value Ref Range    Culture No Growth    Blood Culture Hand, Right    Specimen: Hand, Right; Blood   Result Value Ref Range    Culture No Growth    Blood Culture Hand, Right    Specimen: Hand, Right; Blood   Result Value Ref Range    Culture No Growth    Blood Culture Hand, Left    Specimen: Hand, Left; Blood   Result Value Ref Range    Culture No Growth    Blood Culture Peripheral Blood    Specimen: Peripheral Blood   Result Value Ref Range    Culture No Growth    Blood Culture Peripheral Blood    Specimen: Peripheral Blood   Result Value Ref Range    Culture Positive on the 1st day of incubation (A)     Culture Streptococcus pneumoniae (AA)        Susceptibility     Streptococcus pneumoniae - KARAN     Levofloxacin 1.5 Susceptible ug/mL     Meropenem 0.012 Susceptible ug/mL     Vancomycin 0.50 Susceptible ug/mL     Penicillin (meningitis) 0.016 Susceptible ug/mL     Penicillin (non-meningitis) 0.016 Susceptible ug/mL     Penicillin(oral) 0.016 Susceptible ug/mL     Ceftriaxone (non-meningitis) 0.016 Susceptible ug/mL     Ceftriaxone (meningitis) 0.016 Susceptible ug/mL     *Note: Due to a large number of results and/or encounters for the requested time period, some results have not been displayed. A complete set of results can be found in Results Review.      Urine culture:  No results found. However, due to the size of the patient record, not all encounters were searched. Please check Results Review for a complete set of results.

## 2023-10-18 ENCOUNTER — TELEPHONE (OUTPATIENT)
Dept: TRANSPLANT | Facility: CLINIC | Age: 58
End: 2023-10-18
Payer: COMMERCIAL

## 2023-10-18 LAB
ALBUMIN SERPL BCG-MCNC: 3.1 G/DL (ref 3.5–5.2)
ALP SERPL-CCNC: 187 U/L (ref 40–129)
ALT SERPL W P-5'-P-CCNC: 7 U/L (ref 0–70)
ANION GAP SERPL CALCULATED.3IONS-SCNC: 13 MMOL/L (ref 7–15)
AST SERPL W P-5'-P-CCNC: 11 U/L (ref 0–45)
BILIRUB SERPL-MCNC: 0.5 MG/DL
BUN SERPL-MCNC: 28.3 MG/DL (ref 8–23)
CALCIUM SERPL-MCNC: 8.8 MG/DL (ref 8.6–10)
CHLORIDE SERPL-SCNC: 94 MMOL/L (ref 98–107)
CREAT SERPL-MCNC: 3.81 MG/DL (ref 0.67–1.17)
DEPRECATED HCO3 PLAS-SCNC: 26 MMOL/L (ref 22–29)
EBV DNA # SPEC NAA+PROBE: <500 COPIES/ML
EBV DNA SPEC NAA+PROBE-LOG#: <2.7 {LOG_COPIES}/ML
EGFRCR SERPLBLD CKD-EPI 2021: 17 ML/MIN/1.73M2
ERYTHROCYTE [DISTWIDTH] IN BLOOD BY AUTOMATED COUNT: 18.2 % (ref 10–15)
GLUCOSE SERPL-MCNC: 174 MG/DL (ref 70–99)
HCT VFR BLD AUTO: 27.4 % (ref 40–53)
HGB BLD-MCNC: 8.8 G/DL (ref 13.3–17.7)
MCH RBC QN AUTO: 31.9 PG (ref 26.5–33)
MCHC RBC AUTO-ENTMCNC: 32.1 G/DL (ref 31.5–36.5)
MCV RBC AUTO: 99 FL (ref 78–100)
PLATELET # BLD AUTO: 151 10E3/UL (ref 150–450)
POTASSIUM SERPL-SCNC: 3.7 MMOL/L (ref 3.4–5.3)
PROT SERPL-MCNC: 6.6 G/DL (ref 6.4–8.3)
RBC # BLD AUTO: 2.76 10E6/UL (ref 4.4–5.9)
SODIUM SERPL-SCNC: 133 MMOL/L (ref 135–145)
TACROLIMUS BLD-MCNC: 5.9 UG/L (ref 5–15)
TME LAST DOSE: NORMAL H
TME LAST DOSE: NORMAL H
WBC # BLD AUTO: 5.4 10E3/UL (ref 4–11)

## 2023-10-18 PROCEDURE — 250N000013 HC RX MED GY IP 250 OP 250 PS 637: Performed by: HOSPITALIST

## 2023-10-18 PROCEDURE — 90937 HEMODIALYSIS REPEATED EVAL: CPT

## 2023-10-18 PROCEDURE — 120N000001 HC R&B MED SURG/OB

## 2023-10-18 PROCEDURE — 250N000012 HC RX MED GY IP 250 OP 636 PS 637: Performed by: INTERNAL MEDICINE

## 2023-10-18 PROCEDURE — 36415 COLL VENOUS BLD VENIPUNCTURE: CPT | Performed by: HOSPITALIST

## 2023-10-18 PROCEDURE — 99232 SBSQ HOSP IP/OBS MODERATE 35: CPT | Performed by: INTERNAL MEDICINE

## 2023-10-18 PROCEDURE — 634N000001 HC RX 634: Mod: JZ | Performed by: INTERNAL MEDICINE

## 2023-10-18 PROCEDURE — 80197 ASSAY OF TACROLIMUS: CPT | Performed by: DIETITIAN, REGISTERED

## 2023-10-18 PROCEDURE — 99233 SBSQ HOSP IP/OBS HIGH 50: CPT | Performed by: STUDENT IN AN ORGANIZED HEALTH CARE EDUCATION/TRAINING PROGRAM

## 2023-10-18 PROCEDURE — 250N000009 HC RX 250: Performed by: INTERNAL MEDICINE

## 2023-10-18 PROCEDURE — 250N000011 HC RX IP 250 OP 636: Performed by: INTERNAL MEDICINE

## 2023-10-18 PROCEDURE — 99232 SBSQ HOSP IP/OBS MODERATE 35: CPT | Performed by: PHYSICIAN ASSISTANT

## 2023-10-18 PROCEDURE — 80053 COMPREHEN METABOLIC PANEL: CPT | Performed by: HOSPITALIST

## 2023-10-18 PROCEDURE — 85027 COMPLETE CBC AUTOMATED: CPT | Performed by: STUDENT IN AN ORGANIZED HEALTH CARE EDUCATION/TRAINING PROGRAM

## 2023-10-18 PROCEDURE — 90935 HEMODIALYSIS ONE EVALUATION: CPT | Performed by: INTERNAL MEDICINE

## 2023-10-18 RX ADMIN — OXYCODONE HYDROCHLORIDE 2.5 MG: 5 TABLET ORAL at 07:21

## 2023-10-18 RX ADMIN — OXYCODONE HYDROCHLORIDE 2.5 MG: 5 TABLET ORAL at 12:31

## 2023-10-18 RX ADMIN — GUAIFENESIN 600 MG: 600 TABLET, EXTENDED RELEASE ORAL at 21:33

## 2023-10-18 RX ADMIN — LACOSAMIDE 50 MG: 50 TABLET, FILM COATED ORAL at 12:11

## 2023-10-18 RX ADMIN — GABAPENTIN 300 MG: 300 CAPSULE ORAL at 22:45

## 2023-10-18 RX ADMIN — MELATONIN TAB 3 MG 3 MG: 3 TAB at 22:45

## 2023-10-18 RX ADMIN — SEVELAMER CARBONATE 800 MG: 800 TABLET, FILM COATED ORAL at 16:54

## 2023-10-18 RX ADMIN — ACETAMINOPHEN 650 MG: 325 TABLET, FILM COATED ORAL at 00:16

## 2023-10-18 RX ADMIN — APIXABAN 5 MG: 5 TABLET, FILM COATED ORAL at 12:11

## 2023-10-18 RX ADMIN — LACOSAMIDE 100 MG: 50 TABLET, FILM COATED ORAL at 21:03

## 2023-10-18 RX ADMIN — TACROLIMUS 1 MG: 5 CAPSULE ORAL at 12:24

## 2023-10-18 RX ADMIN — CEFEPIME HYDROCHLORIDE 1 G: 1 INJECTION, POWDER, FOR SOLUTION INTRAMUSCULAR; INTRAVENOUS at 12:11

## 2023-10-18 RX ADMIN — SEVELAMER CARBONATE 800 MG: 800 TABLET, FILM COATED ORAL at 12:01

## 2023-10-18 RX ADMIN — EPOETIN ALFA-EPBX 4000 UNITS: 4000 INJECTION, SOLUTION INTRAVENOUS; SUBCUTANEOUS at 09:26

## 2023-10-18 RX ADMIN — APIXABAN 5 MG: 5 TABLET, FILM COATED ORAL at 21:33

## 2023-10-18 RX ADMIN — MIDODRINE HYDROCHLORIDE 10 MG: 5 TABLET ORAL at 07:22

## 2023-10-18 RX ADMIN — HYDROXYZINE HYDROCHLORIDE 25 MG: 25 TABLET, FILM COATED ORAL at 00:21

## 2023-10-18 RX ADMIN — TACROLIMUS 0.5 MG: 5 CAPSULE ORAL at 17:35

## 2023-10-18 RX ADMIN — ACETAMINOPHEN 650 MG: 325 TABLET, FILM COATED ORAL at 22:53

## 2023-10-18 RX ADMIN — SEVELAMER CARBONATE 800 MG: 800 TABLET, FILM COATED ORAL at 21:03

## 2023-10-18 RX ADMIN — GUAIFENESIN 600 MG: 600 TABLET, EXTENDED RELEASE ORAL at 12:09

## 2023-10-18 RX ADMIN — MIDODRINE HYDROCHLORIDE 10 MG: 5 TABLET ORAL at 16:53

## 2023-10-18 RX ADMIN — Medication 1 CAPSULE: at 12:09

## 2023-10-18 RX ADMIN — MIDODRINE HYDROCHLORIDE 10 MG: 5 TABLET ORAL at 09:27

## 2023-10-18 RX ADMIN — MIDODRINE HYDROCHLORIDE 10 MG: 5 TABLET ORAL at 12:10

## 2023-10-18 RX ADMIN — PANTOPRAZOLE SODIUM 40 MG: 40 TABLET, DELAYED RELEASE ORAL at 06:20

## 2023-10-18 ASSESSMENT — ACTIVITIES OF DAILY LIVING (ADL)
ADLS_ACUITY_SCORE: 27
ADLS_ACUITY_SCORE: 29
ADLS_ACUITY_SCORE: 27
ADLS_ACUITY_SCORE: 29
ADLS_ACUITY_SCORE: 27

## 2023-10-18 NOTE — PROGRESS NOTES
Transfer Type: [facility]  Transfer Triage Note    Date of call: 10/18/23  Time of call: 9:58 AM    Current Patient Location:  [facility]  Current Level of Care: Med Surg    Vitals: BP: 96/67 HR: 72 O2 Sats: 94% on RA  Diagnosis: decompensated cirrhosis of transplanted liver, SBP, chylous ascites  Reason for requested transfer: Patient has established care here  Further diagnostic work up, management, and consultation for specialized care   Isolation Needs: None    Care everywhere has been updated and reviewed: Yes  Necessary images have been sent through PACS: Yes    If patient is transferring for specialty care or specific procedure, the specialist required has participated in the transfer call and agreed with need for transfer and anticipated timeline: No    Transfer accepted: Yes  Stability of Patient: Patient is at risk for instability, however patient requires urgent transfer and does not meet ICU criteria   Is the patient appropriate for [facility]? No, What specific Nada needs are anticipated? Transplant ID, transplant hepatology  Level of Care Needed: IMC  Telemetry Needed:  Med (Remote) Telemetry  Expected Time of Arrival for Transfer: greater than 24 hours  Arrival Location:  [facility]     Recommendations for Management and Stabilization: Not needed    Additional Comments:   58 year old male with PMHx of NAFLD cirrhosis s/p liver transplant in 2016 with recurrent decompensated cirrhosis in transplant, paroxysmal atrial fibrillation, on anticoagulation with Eliquis, diabetes mellitus type 2, previous CVA, hypertension, CHRISTIAN on BiPAP, chronic pericardial effusion, ESRD on hemodialysis who is admitted to [facility] since 10/5/2023. He was initially admitted for palpitations and chest discomfort - these concerns have since been addressed.     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 For the SBP, ID is following and he is currently on cefepime (started 10/17). ID team at [facility] feels that he would benefit from transfer to [facility] where he could be seen by transplant ID. GI is also following, who recommended transfer to [facility] for hepatology evaluation given the complexity of his case, including the possibility of an atypical infection in the setting of tacrolimus use.     Dr. Monet reports that otherwise patient is clinically stable. He does become mildly hypotensive on HD days (SBP 80s, baseline 80-90s). He is asymptomatic with this and is currently on midodrine to support his BP. He is currently on telemetry due to hx of afib and at presentation had wide complex tachycardia thought to be afib with aberrancy (now resolved).      Patient accepted for transfer to [facility] to an [facility] bed with telemetry. Dr. Monet informed that there are currently no beds available, so he will be added to the wait list. Requested that Dr. Monet reach out if there is any change in patient's clinical status while he is waiting for bed.    Sabiha Saucedo MD    Addendum:     Received page that bed is now available on [place] and was asked for updated call on 10/22/23 but provider wasn't available at other hospital. I discussed with my colleague who took over after me.     Nadira Wiggins MD  Internal Medicine/Pediatrics Hospitalist

## 2023-10-18 NOTE — TELEPHONE ENCOUNTER
Pt is inpt at [facility]  waiting for bed availability for transfer here  wants to update Jerrica

## 2023-10-18 NOTE — PLAN OF CARE
[id number]  Pt slightly hypotensive this afternoon, midodrine helped.  Pt up ambulating independently in room/hallway.  Abd distended.  Passing large amount of gas.  Plan for para tomorrow.  Pt's wife at bedside.

## 2023-10-18 NOTE — TELEPHONE ENCOUNTER
Call from Blanco asking about several issues - fluid around heart and ascites.  He wanted me to know about this.  I assured himthat the MD's assigned to him will be able to review records and identify issues - I also encouraged him to communicate w/ the team that is seeing him now.  He's concerned about his diet, losing weight.  Again, I encouraged to talk w/ his providers, communicate concerns.

## 2023-10-18 NOTE — TELEPHONE ENCOUNTER
Call back to Blanco.  Ofe (sig other) answered.    Looks like he is being transferred to us.    Is apparently being treated for SBP.  Encouraged Ofe to be patient.  I called admissions, they have had contact w/ AMRITA Miles.

## 2023-10-18 NOTE — PROGRESS NOTES
[facility]    Medicine Progress Note - Hospitalist Service    Date of Admission:  10/5/2023  Date of Service: 10/18/2023    Assessment & Plan     Blanco Osborne is a 58 year old male with extensive medical history that includes liver transplant in 2016 due to alcoholic liver disease, now with progressive cirrhosis and ascites, paroxysmal atrial fibrillation, on anticoagulation with Eliquis, diabetes mellitus type 2, previous CVA, hypertension, CHRISTIAN on BiPAP, chronic pericardial effusion, ESRD on hemodialysis who presented on 10/5/2023 with palpitations and chest discomfort.       He was hypotensive and had wide-complex tachycardia felt to be atrial fibrillation with aberrancy.  He failed 2 attempts of emergent cardioversion.  He was started on amiodarone and subsequently converted to sinus rhythm. He is now on po amiodarone     He received IV fluids but remained hypotensive and subsequently received pressors that were discontinued on 10/7. He had severe sepsis likely due to SBP and pneumonia. Blood and ascitic fluid cultures were negative. He was intially on vancomycin and zosyn. Vancomycin was discontinued on 10/8.     Troponins were elevated, felt to be demand ischemia.  Echo showed pericardial effusion without tamponade.  CT C/A/P showed PE and pulmonary infiltrates.  He was started on IV heparin.      Acute hypoxic respiratory failure-Resolved - multifactorial  -Clinically patient is on room air since yesterday night and was a combination of likely pneumonia versus fluid overload    Severe sepsis with septic shock - due to multifactorial pneumonia -resolved  Chronic hypotension, on midodrine  -Is normally on midodrine for chronic hypotension    -Initially presented with septic shock which has now resolved.  Off pressors since 10/7/2023.  Blood pressure currently in normal range  -Continue midodrine for chronic hypotension and of note patient on nondialysis takes it on  as needed  basis and after he was transferred from ICU he has been on scheduled 15 three times daily and in spite of that he does get hypotensive at times  -Note patient has been afebrile since he has been in the hospital, has completed 8 days of antibiotics, WBC count continues to be stable, no SBP on the ascitic fluid but in spite of that he continues to have lower blood pressure and he is on very high dose of midodrine as started by the ICU team  -Random cortisol level checked on 10/13 have been normal  -Status 25 g of 25% albumin on 10/14  -Dose of midodrine was cut down to 10 mg 3 times daily scheduled on 10/14 evening  -Infectious disease team was consulted yesterday and they wanted to see if patient can be transferred to [facility] currently they do not have beds and I did talk to transplant ID team as below  -We will monitor how his blood pressures are during the course of the day and patient only takes midodrine on as-needed basis and does take it for sure on days of dialysis    Infectious disease   -Blood cultures on 10/6/2023 have been negative  -C. difficile has been negative during this hospital stay  -Ascitic tap was done on 10/6 and aerobic, anaerobic have been negative and fungal or yeast have been negative so far and is predominantly lymphocytic and triglycerides are high and negative for malignancy  -Ascites tap on 10/10 the aerobic cultures are negative and is predominantly lymphocytic  -Pericardial tap on 10/11-negative for malignancy, Gram stain is negative, aerobic bacterial culture is negative  -Ascitic tap on 10/12-predominantly lymphocytic and triglycerides, flow cytometry shows polytypic B cells, aerobic cultures have not shown any growth  - He was checked for AFB in the ascitic fluid in January 2023  - Case discussed with transplant hepatology and noted transplant ID's request to transfer pt to [facility]. Working on transfer  - Continue cefepime per ID   - respiratory viral panel is  negative  - Strongyloides antibody ordered and pending rest  - No history of pets, exposure to ticks or exotic travel  - CMV is negative     End-stage liver disease, s/p liver transplant in 2016, now with recurrent ascites and Portal hypertension likely due to cirrhosis(status biopsy on 12/22 which showed poorly preserved liver with evidence of cirrhosis  s/p paracentesis on 10/6 and 10/11  Chylous ascites/ effusion  -On presentation on admission patient was found to have effusion and paracentesis was done and the there were 1119 nucleated cells and triglycerides were elevated at more than 400 and it was chylous effusion and patient has been treated with IV Zosyn for presumed SBP  - his differential count from 10/6 shows predominantly lymphocytes(77 per cent) and ANC is only 11.2 and he does not have SBP and his cultures have been negative including aerobic, anaerobic and fungal  -Status repeat paracentesis on 10/10 and 4.6 L of fluid was removed and triglycerides are elevated at 503, chylous effusion, local cultures again are negative and cell count  showed 761 nucleated cells with 73% lymphocytes, 22% monocytes and ANC of 26.6  -GI team from [place] has been following the patient and they recommended right heart cath his concern was that his ascites started at the time when he had cardiopulmonary symptoms and If his pressures are not elevated then future test may include transjugular liver biopsy and or hepatic vein wedge pressure measurement and that decision will be up to the transplant team of Dr. Simms and they are in touch with him  -right heart cath on 10/11 which shows normal right and left-sided filling pressures, mild elevated pulmonary pressures and portal hypertension with hepatic vein pressure gradient of 10 mmHg  -Most likely cause of portal hypertension is cirrhosis as evident on biopsy done on 12/22  - 10/12-10/12-Tacrolimus levels were high and transplant hepatology has been managing his  tacrolimus    -As per GI team note dated 10/13 on next Repeat the sciatic studies including triglyceridesTG (to evaluate if improved post restriction to low fat diet), cell count with diff, cultures, Alb, T.PRO.  -If TG improved on next paracentesis, would continue very low fat (LCT) diet for 2-3 weeks then liberalize fat restriction for LCT challenge and repeat TG on next paracentesis to evaluate if chylous ascites persists.  -If chylous ascites persists despite compliance to low fat diet, consider IR consult for lymphoscintigraphy (possibly with SPECT CT) to locate site of lymphatic leak for embolization of leak.   -No MCT supplementation in the setting of Cirrhosis (high risk for narcosis)   -Calories count  -Nutrition consulted  -10/14-d/w with Dr. Leventhal from transplant hepatology and he did start the patient back on tacrolimus at 1 mg and 0.5 mg and repeat levels to be done on Monday or Tuesday and I did discuss albumin and he generally prefers 25% given that volume is less  -10/16 -> repeat paracentesis with ascites studies: triglcyerides, AFB culture and stain, fungal cultures, cell count with diff, cultures, albumin and total protein. Give 25g IV albumin given symptomatic hypotension following HD and paracentesis    - Dscussed with transplant hepatology and noted transplant ID's request to transfer pt to [facility]. Working on transfer, currently on waitlist.     Multifocal pneumonia, organism unspecified  - CT scan showed pulmonary infiltrates   - COVID 19/influenza/RSV negative  - blood cultures negative till date from 10/5  - unable to provide sputum specimen  -vancomycin was discontinued on 10/8  -Patient received total of 7 to 8 days of IV Zosyn and then another day Augmentin and it was discontinued with last dose on 10/13    Diarrhea-improved  -C. difficile has been negative on 10/10     Pulmonary embolism in segmental and subsegmental pulmonary arteries of left upper lobe, without cor pulmonale -  Short  segment  thrombophlebitis in right cephalic vein at antecubital fossa, likely related to prior intravenous access  -Eliquis held on admission and was started on IV heparin  -Continue with Eliquis which was restarted on     Recurrent chronic pericardial effusion, s/p pericardiocentesis on 9/29.  Drain was removed on 9/30.   Status repeat pericardiocentesis on 10/11  - Felt to be due to volume overload due to renal failure   -  TTE 10/7 showed moderate pericardial effusion, slightly larger than seen on 10/5, with no evidence of tamponade physiology  - echo on 10/9 showed moderate to large bloody cardial effusion and as compared to 10/7 it has increased, IVC is mildly dilated, mild RV diastolic collapse and has inflow velocity variation consistent with hemodynamic compromise  -Status repeat pericardiocentesis with removal of 560 mL of fluid and it was bloody, red and there were 2558 nucleated cells, no organisms, 2 WBCs, glucose of 79, total protein of 4.9 and albumin level of 2.7 and ADA is pending along with cytology and he had post procedure repeat echo which showed excellent results after removal of fluid  -Drain was removed on 10/12  -Repeat echo as per cardiology -> Repeat echocardiogram tomorrow   -If patient has repeat pericardiocentesis in future then sent for AFB/fungal culture, 16 S and 28 S    Recurrent chest discomfort  -He has been having on and off chest discomfort and initially during the course of hospitalization his troponin did peak at 346 on 10/5 and it was thought to be due to demand because of shock and EKG did not had any ST elevation and patient had rapid response yesterday and repeat troponin yesterday showed troponins have trended down to 307  -Echo as above  - discussed with cardiology and as per them his chest discomfort was likely due to pericardial effusion  -In case in future he has recurrent chest discomfort he will need ischemic work-up  -Patient again and had episode of chest  discomfort on 10/14 and was present on palpation and is similar to prior episodes and troponin was checked and was 320 and a repeat this morning is similar and has trended down and EKG did not show any ST elevation  -Discussed with the cardiology team and we will have them see him again tomorrow     Chronic paroxysmal atrial fibrillation, on anticoagulation with Eliquis  Wide-complex tachycardia on admission felt to be atrial fibrillation with aberrant conduction, failed cardioversion x 2  -Patient was started on amiodarone and converted to normal sinus rhythm and is currently on oral amiodarone  -Family did wanted to discuss about ongoing use of amiodarone as it has a lot of side effects and can interfere with tacrolimus levels and we will consult cardiology team again for tomorrow morning     ESRD, on hemodialysis q. Monday, Wednesday, Friday  S/p left AV fistula pseudoaneurysm repair, 8/2023  His AV fistula on the left upper extremity infiltrated on 10/6-  - continue dialysis per nephrology and continue with renal multivitamin and sevelamer        Small bilateral pleural effusions  Chronically loculated right pleural effusion  - stable         Generalized anxiety disorder  Restless leg syndrome  -On hydroxyzine and ropinirole        Chronic anemia-due to anemia of chronic disease  -Hemoglobin is baseline between 8-9  -Hemoglobin is stable today at 7.9 on 10/15  -We will check labs intermittently     chronic thrombocytopenia  -Platelet count this morning is 145    Seizure disorder  - continue Vimpat     GERD  -Continue PPI     Probable CHRISTIAN  --Per patient's wife, he had a sleep study about 10 years ago but does not have any CPAP or BiPAP at home  -Did use BiPAP last night  -Need to  undergo sleep study as outpatient after discharge     Previous embolic strokes     Generalized weakness  -Continue with physical therapy and Occupational Therapy.     Pain at multiple locations  -Has pain in left upper extremity due to  infiltration of AV fistula, pain in right upper extremity due to thrombophlebitis and frequent lab draws.  Also complains of diffuse pain in shoulders and upper back  -continue Tylenol as needed  -IV Dilaudid 0.2 mg every 6 hours as needed and Percocet every 8 hours as needed     Plan for 10/16  -Hemodialysis as per nephrology  -Touch base with GI team regarding tacrolimus dose  -       Diet: Snacks/Supplements Adult: Gelatein Plus; Between Meals  Room Service  Snacks/Supplements Adult: Other; pt may order diet-appropriate supplements (gelatein or ensure clear) PRN; With Meals  Low Saturated Fat Na <2400 mg    DVT Prophylaxis: Heparin drip  Nixon Catheter: Not present  Lines: PRESENT      Hemodialysis Vascular Access Right Subclavian-Site Assessment: WDL  Hemodialysis Vascular Access Arteriovenous fistula Left Forearm-Site Assessment: WDL;Thrill present;Bruit present      Cardiac Monitoring: ACTIVE order. Indication: Tachyarrhythmias, acute (48 hours)  Code Status: Full Code      Clinically Significant Risk Factors              # Hypoalbuminemia: Lowest albumin = 2.8 g/dL at 10/17/2023  6:33 AM, will monitor as appropriate  # Coagulation Defect: INR = 2.00 (Ref range: 0.85 - 1.15) and/or PTT = 47 Seconds (Ref range: 22 - 38 Seconds), will monitor for bleeding        # Hypertension: Noted on problem list        # Severe Obesity: Estimated body mass index is 57.05 kg/m  as calculated from the following:    Height as of this encounter: 1.829 m (6').    Weight as of this encounter: 190.8 kg (420 lb 10.2 oz).     # Moderate Malnutrition: based on nutrition assessment           Disposition Plan      Expected Discharge Date: 10/19/2023      Destination: home with family;inpatient rehabilitation facility  Discharge Comments: 10-11 angio and pericardiocentesis and HD          Franky Monet MD  Hospitalist Service  [facility]  Securely message with [employer] (more info)  Text page via [employer]  Paging/Directory   _  _____________________________________________________________________    Interval History     No acute events overnight  Abx started overnight for SBP  He denies any lightheadedness or dizziness.    No CP/SOB  No fevers  Working on transfer to [facility]  Would like paracentesis tomorrow.     Physical Exam     Temp: 99  F (37.2  C) Temp src: Oral BP: (!) 82/45 Pulse: 71   Resp: 18 SpO2: 96 % O2 Device: None (Room air)       General: Patient appears comfortable and in no acute distress.  Respiratory: Lungs are clear to auscultation bilaterally with no wheeze or crackles and has right-sided permacath  Cardiovascular: Regular rate , S1 and S2 normal with no murmer or rubs or gallops  Abdomen:   soft , non tender , mild distended and bowel sounds are present   Skin: No skin rashes   Neurologic:  No facial droop  Musculoskeletal: Normal Range of motion over upper and lower extremities bilaterally   Psychiatric: cooperative          Medical Decision Making       Time spent in care of patient is 50 minutes and I discussed plan of care with the patient, nurse , and discussed plan with the patient's brother and the family     Data     I have personally reviewed the following data over the past 24 hrs:    5.4  \   8.8 (L)   / 151     133 (L) 94 (L) 28.3 (H) /  174 (H)   3.7 26 3.81 (H) \     ALT: 7 AST: 11 AP: 187 (H) TBILI: 0.5   ALB: 3.1 (L) TOT PROTEIN: 6.6 LIPASE: N/A       Imaging results reviewed over the past 24 hrs:   No results found for this or any previous visit (from the past 24 hour(s)).

## 2023-10-18 NOTE — PLAN OF CARE
Orientation: A+0x4, Forgetful  Activity: SBA  Diet/BS Checks: Low fat, low sodium  Tele:  Normal Sinus  IV Access/Drains: R PIV SL,   Pain Management: Oxy x1  Abnormal VS/Results: AVSS on RA, hypotensive (baseline)  Bowel/Bladder: Cont of B/B. Minimal UOP (baseline- hemodialysis MWF)  Skin/Wounds: L Fistula, R CVC, L groin, L neck site. Skin tear on L elbow.  Consults: GI, Neph, ID, cardiology  D/C Disposition: Pending Improvement  Other Info: Dialysis run completed this morning. Mepilex for L elbow skin tear changed.

## 2023-10-18 NOTE — PROGRESS NOTES
Potassium   Date Value Ref Range Status   10/17/2023 4.7 3.4 - 5.3 mmol/L Final   12/29/2022 3.4 3.4 - 5.3 mmol/L Final   04/20/2020 3.9 3.4 - 5.3 mmol/L Final     Potassium POCT   Date Value Ref Range Status   10/05/2023 3.6 3.4 - 5.3 mmol/L Final     Hemoglobin   Date Value Ref Range Status   10/17/2023 7.9 (L) 13.3 - 17.7 g/dL Final   04/20/2020 14.3 13.3 - 17.7 g/dL Final     Creatinine   Date Value Ref Range Status   10/17/2023 5.68 (H) 0.67 - 1.17 mg/dL Final   04/20/2020 1.06 0.66 - 1.25 mg/dL Final     Urea Nitrogen   Date Value Ref Range Status   10/17/2023 47.8 (H) 8.0 - 23.0 mg/dL Final   12/29/2022 46 (H) 7 - 30 mg/dL Final   04/20/2020 23 7 - 30 mg/dL Final     Sodium   Date Value Ref Range Status   10/17/2023 132 (L) 135 - 145 mmol/L Final     Comment:     Reference intervals for this test were updated on 09/26/2023 to more accurately reflect our healthy population. There may be differences in the flagging of prior results with similar values performed with this method. Interpretation of those prior results can be made in the context of the updated reference intervals.    04/20/2020 139 133 - 144 mmol/L Final     INR   Date Value Ref Range Status   10/17/2023 2.00 (H) 0.85 - 1.15 Final   10/07/2019 1.20 (H) 0.86 - 1.14 Final         Latest Reference Range & Units 09/27/23 17:50   Hep B Surface Agn Nonreactive  Nonreactive   Hepatitis B Surface Antibody Instrument Value <8.00 m[IU]/mL 0.12   Hepatitis B Surface Antibody   Nonreactive      DIALYSIS PROCEDURE NOTE  Hepatitis status of previous patient on machine log was checked and verified ok to use with this patients hepatitis status.  Patient dialyzed for 3.5 hrs. on a K2 bath with a net fluid removal of  2.5L.  A BFR of 400 ml/min was obtained via a CVC.     The treatment plan was discussed with Dr. Gale during the treatment.    Total heparin received during the treatment: 0 units.      Line flushed, clamped and capped with heparin 1:1000 1.9 mL  (1900 units) per lumen     Meds  given: midodrine, retacrit, see MAR  Complications: Hypotension       Person educated: patient. Knowledge base substantial. Barriers to learning: none. Educated on procedure via verbal mode. Patient verbalized understanding.      ICEBOAT? Timeout performed pre-treatment  I: Patient was identified using 2 identifiers  C:  Consent Signed Yes  E: Equipment preventative maintenance is current and dialysis delivery system OK to use  B: Hepatitis B Surface Antigen: negative; Draw Date: 9/27/23      Hepatitis B Surface Antibody: susceptible; Draw Date: 9/27/23  O: Dialysis orders present and complete prior to treatment  A: Vascular access verified and assessed prior to treatment  T: Treatment was performed at a clinically appropriate time  ?: Patient was allowed to ask questions and address concerns prior to treatment  See Adult Hemodialysis flowsheet in [employer] for further details and post assessment.  Machine water alarm in place and functioning. Transducer pods intact and checked every 15min.   Pt returned via bed.  Chlorine/Chloramine water system checked every 4 hours.  Outpatient Dialysis at [facility]     Patient repositioned every 2 hours during the treatment.  Post treatment report given to JACKIE Pickard RN regarding 2.5L of fluid removed, last BP of 99/72, and patient pain rating of 0/10.

## 2023-10-18 NOTE — PLAN OF CARE
Goal Outcome Evaluation:    Summary:  Presented to the ER on 10/05 with palpations, hypotension  and chest discomfort. Found to have acute hypoxic respiratory failure and severe sepsis with septic shock     Primary Diagnosis: Severe sepsis with septic shock - due to multifactorial pneumonia, hypotension  Hx:ESRD, on HD (MW,F), post liver transplant in 2016, pericardial effusion, anxiety, RLS, seizure disorder, CHRISTIAN, stroke, A-fib, pleural effusion     Date/time: 10/17/23 [id number]  Orientation: A+Ox4, forgetful at times   Aggression Stop Light: Green  Mobility: SBA   Tele: NSR  Pain Management:  PRN Percocet   Diet: Low saturated fat, Na < 2400  Bowel/Bladder: Continent of B/B. Minimal UOP (baseline).On HD  Abnormal Lab/Assessments: Mild hypotension, VSS on RA . Na 132, Hgb 7.9, Creatinine 5.68, hematocrit 25.0   Drain/Device/Wound: L dialysis fistula , R chest CVC, L groin & neck insertion sites (CDI). R PIV-SL. Bruises on L hand, R groin and perineum. Mepilex left elbow   Consults: ID, cardio, PT,OT, Nephro   D/C Day/Goals/Place: TBD  Other info: Pt said that one of his visitors  2 days ago tested positive for Covid 19. Pt requested test and was negative.

## 2023-10-18 NOTE — PROGRESS NOTES
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 2016 now with progressive cirrhosis (liver bx 12/2022) with evidence of portal hypertension and ascites, paroxysmal atrial fibrillation on Eliquis, DM type 2, history of CVA, hypertension, CHRISTIAN on BiPAP, chronic pericardial effusion, ESRD on dialysis who presented to [facility] ED on 10/5/23 with chest discomfort and palpitations, admitted with severe sepsis with septic shock requiring pressors secondary to multifactorial pneumonia, atrial fibrillation with aberrancy s/p 2 failed attempts at cardioversion, worsening pericardial effusion on ECHO s/p pericardiocentesis 10/11, and PE started on IV heparin. GI consulted for \"liver transplant, developing portal hypertension, initial tap concerning for sbp\".      # Cirrhosis secondary to NAFLD s/p liver transplant 2016 now with progressive cirrhosis with evidence of portal hypertension and ascites   # SBP   # Chylous ascites   # Abdominal pain   - Patient follows with Dr. Simms, though notably has not been seen in years; last clinic visit 4/21/2020 with plan for 6 month follow up. Next appt 12/2023   - Patient presented with palpitations and chest discomfort but also noted worsening abdominal distention and pain that began around the same time. He has required paracentesis in the past but stated this was back in 2016.    - PTA on tacrolimus 1mg q 12 hours. Tacro level supratherapeutic at 8 on 10/11 (desired levels 3-5), held initially then resumed at 1mg in the morning and 0.5mg in the evening. Tacro level 4.4 on 10/18. Tacro level not collected yet.   - Admit labs with normal LFTs other than mildly elevated alk phos which is elevated again but has been improving, labs have not been collected yet today. INR 2.00 (1.55 on " admission). WBC normal yesterday. Liver bx 12/2022 with evidence of cirrhosis, but notably the sample was autolyzed with delayed fixation.   - Paracentesis   - 10/6: 3L removed with 1119 nucleated cells, triglycerides >400 consistent with chylous ascites. SAAG 1.7 consistent with portal hypertension. Cytology negative for malignancy. Cultures are negative including aerobic culture, anaerobic culture and fungal.   - 10/10: 4.65L removed with 761 total nucleated cells, SAAG 3.4. Cultures negative. Triglycerides 503  - 10/12: 3.4L removed with 719 total nucleated cells, SAAG 2.8. Cultures negative. Cytology negative for malignancy. Flow cytometry with polytypic B cells, triglycerides 303, lipase 37.   - 10/16: 2L max removed with 3,844 total nucleated cells, SAAG 2.1, ANC 3,190, prelim aerobic cultures NGTD, prelim fungal culture NGTD, triglycerides 332. AFB culture and stain pending  - Antibiotics: Zosyn (10/5-10/12) for pneumonia and possible SBP (no evidence of SBP on peritoneal fluid analysis 10/6), Augmentin (10/12-10/13) for pneumonia, Ceftriaxone for SBP (10/16 x1) switched to cefepime per ID (10/17- present)  - Quantiferon gold TB negative, HIV negative, EBV detected <500, CMV negative, respiratory panel negative, strongyloides ab IgG negative  - s/p RHC 10/11 which showed normal right and left sided filling pressures, mild elevated pulmonary pressures and portal hypertension with hepatic vein pressure gradient of 10mmHg      - Ascites likely related to portal HTN in the setting of cirrhosis, further c/b disruption of lymphatics (d/t increased lymph flow and portal HTN seen with cirrhosis) leading to chylous ascites as reflective of persistent TG >200 in ascites fluid.  Currently on 2gm NA and very low fat diet. He continues to have ongoing TG elevations in ascites and requiring paracentesis every 2-4 days for relief of abd distention. Suspect ascites re-accumulation exacerbated by chyle formation. High risk  "for developing malnutrition (PRO losses) and further immunocompromise with chyle losses. RD following. Would avoid MCT supplementation in the setting of cirrhosis (increased risk for narcosis/altering mentation).     # Moderate protein-energy malnutrition  - RD following for calorie counts, education on low fat diet, and assistance with increasing PRO/kcals. 3 day calorie count average with \"1666 cals (76% needs), 105 gm pro (100% needs) Calorie count reflects pt meeting at least 2/3 nutrition needs with oral intake\" per RD note 10/16     # Diarrhea, resolved  - Loose stools began 10/9, per nursing charting patient having 1-2 stools per day   - No hx of Cdiff, C diff negative 10/10  - Treated with Zosyn 10/5-10/12, Augmentin 10/12-10/13 and given one dose of vancomycin 10/5  - Could be secondary to bowel edema/poor absorption from portal hypertension, but likely antibiotics as diarrhea has resolved when antibiotics were stopped      # Recurrent pericardial effusion  # Paroxysmal atrial fibrillation   # NSTEMI  - PTA on Eliquis, now continued   - Cardiology followed, now signed off   - ECHO 10/10 with recurrent pericardial effusion slightly larger than seen on ECHO 10/8 s/p pericardiocentesis 10/11 with 560cc removed   - s/p RHC 10/11 which showed normal right and left sided filling pressures, mild elevated pulmonary pressures and portal hypertension with hepatic vein pressure gradient of 10mmHg   - Repeat ECHO 10/17 with no recurrent pericardial effusion, LVEF >70%     # Pulmonary embolism  - Noted on CT scan   - Treated with heparin gtt      # ESRD on HD  - Nephrology following, HD on MWF  - On kidney transplant list     RECOMMENDATIONS:  - Transfer to [facility] when bed is available  - Continue empiric antibiotics per ID   - Continue very low fat diet, do not expect TG to change quickly so will not plan on repeating TG in ascites fluid at this time   - Likely repeat paracentesis tomorrow   - Follow tacrolimus trough " levels; Discussed with Jacklyn Liu, transplant hepatology CNP on 10/16  -If drug level 3-5, continue Tacrolimus and continue to check level M/W/F to monitor levels.                -If drug level >5, would continue Tacrolimus at decreased dose of 0.5mg BID and recheck level daily until within therapeutic range.  - If hypotensive and sx orthostasis post paracentesis or HD runs, would rec give IV Alb, 12.5-25 g (planning to give 25g with paracentesis today)  - Appreciate RD following, continue low fat diet   - Continue PPI       GI will continue to follow     Thank you for involving us in this patient's care. Please do not hesitate to contact the GI service with any questions or concerns.     Pt care plan discussed with Dr. Leventhal, GI hepatology staff physician.    Overall time spent on the date of this encounter preparing to see the patient (including chart review of available notes, clinical status events, imaging and labs); obtaining and/or reviewing separately obtained history; ordering medications, tests or procedures; communicating with other health care professionals; and documenting the above clinical information in the electronic medical record was 45 minutes.    Martha Medrano PA-C  GI Service  [facility]  Text Page (Monday-Friday, 8am-4pm)    To page the On-Call [facility] provider 24 hours a day, please click [facility] and select GASTROENTEROLOGY MEDICINE ADULT / [facility] in the drop-down menu.   _______________________________________________________________      Subjective: Nursing notes and 24hr events reviewed. Patient seen on HD run. He reports that he still has abdominal pain but has been taking pain medication for that and his left arm, which has helped. He denies feeling significantly distended and has been able to eat though he expresses concern regarding the limited options of food he can have given his diet restrictions. No fevers, chills. He denies recurrence of  diarrhea.     ROS:   4 pt ROS negative unless noted in subjective.     Medications:  Current Facility-Administered Medications   Medication    - MEDICATION INSTRUCTIONS for Dialysis Patients -    acetaminophen (TYLENOL) tablet 650 mg    Or    acetaminophen (TYLENOL) Suppository 650 mg    apixaban ANTICOAGULANT (ELIQUIS) tablet 5 mg    benzocaine-menthol (CHLORASEPTIC) 6-10 MG lozenge 1 lozenge    calcium carbonate (TUMS) chewable tablet 500 mg    camphor-menthol (DERMASARRA) lotion    ceFEPIme (MAXIPIME) 1 g vial to attach to  mL bag for ADULTS or NS 50 mL bag for PEDS    glucose gel 15-30 g    Or    dextrose 50 % injection 25-50 mL    Or    glucagon injection 1 mg    epoetin mirta-epbx (RETACRIT) injection 4,000 Units    gabapentin (NEURONTIN) capsule 300 mg    guaiFENesin (MUCINEX) 12 hr tablet 600 mg    HYDROmorphone (DILAUDID) injection 0.2 mg    hydrOXYzine (ATARAX) tablet 25 mg    lacosamide (VIMPAT) tablet 100 mg    lacosamide (VIMPAT) tablet 50 mg    melatonin tablet 3 mg    midodrine (PROAMATINE) tablet 10 mg    midodrine (PROAMATINE) tablet 10 mg    multivitamin RENAL (TRIPHROCAPS) capsule 1 capsule    naloxone (NARCAN) injection 0.2 mg    Or    naloxone (NARCAN) injection 0.4 mg    Or    naloxone (NARCAN) injection 0.2 mg    Or    naloxone (NARCAN) injection 0.4 mg    No heparin via hemodialysis machine    ondansetron (ZOFRAN ODT) ODT tab 4 mg    Or    ondansetron (ZOFRAN) injection 4 mg    oxyCODONE IR (ROXICODONE) half-tab 2.5 mg    pantoprazole (PROTONIX) EC tablet 40 mg    Patient is already receiving anticoagulation with heparin, enoxaparin (LOVENOX), warfarin (COUMADIN)  or other anticoagulant medication    QUEtiapine (SEROquel) half-tab 50 mg    rOPINIRole (REQUIP) tablet 0.5 mg    sevelamer carbonate (RENVELA) tablet 800 mg    sodium chloride 0.9% BOLUS 100-150 mL    sodium chloride 0.9% BOLUS 250 mL    sodium chloride 0.9% BOLUS 300 mL    tacrolimus (GENERIC) suspension 0.5 mg    tacrolimus  (GENERIC) suspension 1 mg       Objective:  Blood pressure 95/65, pulse 75, temperature 98.2  F (36.8  C), temperature source Oral, resp. rate 23, height 1.829 m (6'), weight (!) 190.8 kg (420 lb 10.2 oz), SpO2 94%.  Gen: Lying in dialysis bed. Appears comfortable  HEENT: NCAT. Conjunctiva clear. Sclera anicteric   CV: Regular rate   Resp: Non-labored breathing  Abd: Soft, mild generalized tenderness, distended, no guarding or rebound   Skin:  No jaundice  Ext: warm, well perfused  Mental status/Psych: A&O. Asks/answers questions appropriately     PROCEDURES:  10/16: Paracentesis with 2L (max) of straw colored fluid drained. Technically successful paracentesis without immediate complications. Given 25g albumin     10/12: Paracentesis with 3.4 L of cloudy/light brown fluid drained.  There were no immediate complications.     10/10: Paracentesis with 4.65 L of  straw colored fluid was drained. There were no immediate complications.     10/6: Paracentesis with 3L  cloudy, turbid milky fluid aspirated into vacuum bottles.    LABS:  BMP  Recent Labs   Lab 10/17/23  0633 10/16/23  2228 10/15/23  0747 10/14/23  1042   * 131* 129* 130*   POTASSIUM 4.7 4.4 4.7 4.4   CHLORIDE 94* 92* 91* 93*   KELLY 8.7 8.6 9.1 8.8   CO2 22 23 23 23   BUN 47.8* 41.1* 52.2* 35.3*   CR 5.68* 5.09* 6.38* 4.83*   GLC 99 123* 106* 146*     CBC  Recent Labs   Lab 10/17/23  0633 10/14/23  1042 10/13/23  1155 10/12/23  0644   WBC 7.6 8.4 6.5 6.4   RBC 2.51* 2.51* 2.65* 2.50*   HGB 7.9* 7.9* 8.3* 8.0*   HCT 25.0* 25.2* 25.9* 24.8*    100 98 99   MCH 31.5 31.5 31.3 32.0   MCHC 31.6 31.3* 32.0 32.3   RDW 18.0* 16.6* 15.8* 15.4*   * 145* 143* 128*     INR  Recent Labs   Lab 10/17/23  0633   INR 2.00*     LFTs  Recent Labs   Lab 10/17/23  0633 10/16/23  2228 10/15/23  0747 10/14/23  1042   ALKPHOS 142* 158* 164* 142*   AST 14 13 12 12   ALT 7 6 6 7   BILITOTAL 0.5 0.5 0.6 0.5   PROTTOTAL 5.9* 6.3* 6.4 6.1*   ALBUMIN 2.8* 3.2* 3.0* 2.9*       PANC  Recent Labs   Lab 10/13/23  0850   LIPASE 24     CULTURES:   7-Day Micro Results       Collected Updated Procedure Result Status      10/17/2023 2010 10/17/2023 2056 Asymptomatic COVID-19 Virus (Coronavirus) by PCR Nasopharyngeal [[id number]]    Swab from Nasopharyngeal    Final result Component Value   SARS CoV2 PCR Negative   NEGATIVE: SARS-CoV-2 (COVID-19) RNA not detected, presumed negative.            10/16/2023 1511 10/16/2023 1941 Cell count with differential fluid [[id number]]    (Abnormal)   Ascites Fluid from Paracentesis    Final result Component Value   No component results            10/16/2023 1511 10/16/2023 1604 Albumin fluid [[id number]]    Ascites Fluid from Paracentesis    Final result Component Value Units   Albumin Fluid Source Abdomen    ascites  ascites   Albumin fluid 1.1 g/dL            10/16/2023 1511 10/16/2023 1604 Protein fluid [[id number]]    Ascites Fluid from Paracentesis    Final result Component Value Units   Protein Fluid Source Abdomen    ascites  ascites  ascites   Protein Total Fluid 2.6 g/dL            10/16/2023 1511 10/17/2023 1205 Ascites Fluid Aerobic Bacterial Culture Routine [[id number]]   Ascites Fluid from Abdomen    Preliminary result Component Value   Culture No growth, less than 1 day  [P]                10/16/2023 1511 10/16/2023 1520 Acid-Fast Bacilli Culture and Stain [[id number]]    Ascites Fluid from Abdomen    In process Component Value   No component results            10/16/2023 1511 10/17/2023 1832 Fungal or Yeast Culture Routine [[id number]]   Ascites Fluid from Abdomen    Preliminary result Component Value   Culture No growth after 1 day  [P]                10/16/2023 1511 10/16/2023 1938 Cell Count Body Fluid [[id number]]    (Abnormal)   Ascites Fluid from Paracentesis    Final result Component Value Units   Color Orange    Clarity Turbid    Cell Count Fluid Source Abdomen    ascites  ascites   Total Nucleated Cells 3,844 /uL             10/16/2023 1511 10/16/2023 1520 Acid-Fast Bacilli Culture and Stain [[id number]]   Ascites Fluid from Abdomen    In process Component Value   No component results            10/16/2023 1511 10/16/2023 1941 Differential Body Fluid [[id number]]    (Abnormal)   Ascites Fluid from Paracentesis    Final result Component Value Units   % Neutrophils 83 %   % Lymphocytes 7 %   % Monocyte/Macrophages 10 %   Absolute Neutrophils, Body Fluid 3,190.5 /uL            10/14/2023 2101 10/15/2023 1100 Cytomegalovirus DNA by PCR, Quantitative [[id number]]    Blood from Arm, Right    Final result Component Value Units   CMV DNA IU/mL Not Detected IU/mL            10/14/2023 1808 10/14/2023 2211 Respiratory Panel PCR [[id number]]    Swab from Nasopharyngeal    Final result Component Value   Adenovirus Not Detected   Coronavirus Not Detected   This test detects Coronavirus 229E, HKU1, NL63 and OC43 but does not distinguish between them. It does not detect MERS ( Respiratory Syndrome), SARS (Severe Acute Respiratory Syndrome) or 2019-nCoV (Novel 2019) Coronavirus.   Human Metapneumovirus Not Detected   Human Rhin/Enterovirus Not Detected   Influenza A Not Detected   Influenza A, H1 Not Detected   Influenza A 2009 H1N1 Not Detected   Influenza A, H3 Not Detected   Influenza B Not Detected   Parainfluenza Virus 1 Not Detected   Parainfluenza Virus 2 Not Detected   Parainfluenza Virus 3 Not Detected   Parainfluenza Virus 4 Not Detected   Respiratory Syncytial Virus A Not Detected   Respiratory Syncytial Virus B Not Detected   Chlamydia Pneumoniae Not Detected   Mycoplasma Pneumoniae Not Detected            10/13/2023 1155 10/16/2023 1307 Quantiferon TB Gold Plus Grey Tube [[id number]]   Peripheral Blood    Final result Component Value Units   Quantiferon Nil Tube 0.00 IU/mL            10/13/2023 1155 10/16/2023 1307 Quantiferon TB Gold Plus Green Tube [[id number]]   Peripheral Blood    Final result Component  Value Units   Quantiferon TB1 Tube 0.00 IU/mL            10/13/2023 1155 10/16/2023 1308 Quantiferon TB Gold Plus Yellow Tube [[id number]]   Peripheral Blood    Final result Component Value   Quantiferon TB2 Tube 0.00            10/13/2023 1155 10/16/2023 1308 Quantiferon TB Gold Plus Purple Tube [[id number]]   Peripheral Blood    Final result Component Value Units   Quantiferon Mitogen 0.60 IU/mL            10/13/2023 1155 10/16/2023 1317 Quantiferon TB Gold Plus [[id number]]   Peripheral Blood    Final result Component Value Units   Quantiferon-TB Gold Plus Negative    No interferon gamma response to M.tuberculosis antigens was detected. Infection with M.tuberculosis is unlikely, however a single negative result does not exclude infection. In patients at high risk for infection, a second test should be considered in accordance with the 2017 ATS/IDSA/CDC Clinical Pract  ice Guidelines for Diagnosis of Tuberculosis in Adults and Children    TB1 Ag minus Nil Value 0.00 IU/mL   TB2 Ag minus Nil Value 0.00 IU/mL   Mitogen minus Nil Result 0.60 IU/mL   Nil Result 0.00 IU/mL            10/12/2023 1113 10/13/2023 2103 Cell count with differential fluid [[id number]]    (Abnormal)   Ascites Fluid from Paracentesis    Final result Component Value   No component results            10/12/2023 1113 10/12/2023 1235 Albumin fluid [[id number]]    Ascites Fluid from Paracentesis    Final result Component Value Units   Albumin Fluid Source Abdomen    Albumin fluid 1.0 g/dL            10/12/2023 1113 10/12/2023 1235 Protein fluid [[id number]]    Ascites Fluid from Paracentesis    Final result Component Value Units   Protein Fluid Source Abdomen    Protein Total Fluid 2.4 g/dL            10/12/2023 1113 10/17/2023 0724 Ascites Fluid Aerobic Bacterial Culture Routine [[id number]]   Ascites Fluid from Peritoneum    Final result Component Value   Culture No Growth               10/12/2023 1113 10/16/2023 1904 Cytology,  non-gynecologic [[id number]]   Ascites Fluid from Abdomen    Final result Component Value   Final Diagnosis This result contains rich text formatting which cannot be displayed here.   Clinical Information This result contains rich text formatting which cannot be displayed here.   Gross Description This result contains rich text formatting which cannot be displayed here.   Microscopic Description This result contains rich text formatting which cannot be displayed here.   Performing Labs This result contains rich text formatting which cannot be displayed here.            10/12/2023 1113 10/13/2023 2101 Cell Count Body Fluid [[id number]]    (Abnormal)   Ascites Fluid from Paracentesis    Final result Component Value Units   Color Yellow    Clarity Turbid    Body Fluid Comment Slides sent for a path review.    Cell Count Fluid Source Abdomen    Total Nucleated Cells 719 /uL            10/12/2023 1113 10/13/2023 2103 Differential Body Fluid [[id number]]    Ascites Fluid from Paracentesis    Final result Component Value Units   % Neutrophils 1 %   % Lymphocytes 88 %   % Monocyte/Macrophages 11 %   Absolute Neutrophils, Body Fluid 7.2 /uL            10/12/2023 1113 10/13/2023 1336 Triglyceride Fluid [[id number]]    Ascites Fluid from Paracentesis    Final result Component Value Units   Triglyceride Fluid Source --    Abdomen   Triglyceride Fluid 303 mg/dL            10/12/2023 1113 10/13/2023 1332 Lipase fluid [[id number]]    Ascites Fluid from Paracentesis    Final result Component Value Units   Lipase Fluid Source --    Abdomen   Lipase fluid 37 U/L            10/12/2023 1113 10/13/2023 1402 Bilirubin fluid [[id number]]    Ascites Fluid from Paracentesis    Final result Component Value Units   Bilirubin Fluid Source Abdomen    Bilirubin fluid 0.3 mg/dL            10/11/2023 1355 10/11/2023 1503 Albumin fluid [[id number]]    Pericardial Fluid from Pericardium    Final result Component Value Units   Albumin  Fluid Source Pericardium    Albumin fluid 2.7 g/dL            10/11/2023 1355 10/11/2023 1719 Cell count with differential fluid [[id number]]    (Abnormal)   Pericardial Fluid from Pericardium    Final result Component Value   No component results            10/11/2023 1355 10/13/2023 1655 Cytology non gyn [[id number]]   Pericardial Fluid from Pericardium    Final result Component Value   Final Diagnosis This result contains rich text formatting which cannot be displayed here.   Clinical Information This result contains rich text formatting which cannot be displayed here.   Gross Description This result contains rich text formatting which cannot be displayed here.   Microscopic Description This result contains rich text formatting which cannot be displayed here.   Performing Labs This result contains rich text formatting which cannot be displayed here.            10/11/2023 1355 10/16/2023 0738 Pericardial Fluid Aerobic Bacterial Culture Routine [[id number]]   Pericardial Fluid from Pericardium    Final result Component Value   Culture No Growth               10/11/2023 1355 10/11/2023 1715 Gram stain [[id number]]   Pericardial Fluid from Pericardium    Final result Component Value   Gram Stain Result No organisms seen   Gram Stain Result 2+ WBC seen            10/11/2023 1355 10/11/2023 1503 Glucose fluid [[id number]]    Pericardial Fluid from Pericardium    Final result Component Value Units   Glucose Fluid Source Pericardium    Glucose fluid 79 mg/dL            10/11/2023 1355 10/11/2023 1503 Protein fluid [[id number]]    Pericardial Fluid from Pericardium    Final result Component Value Units   Protein Fluid Source Pericardium    Protein Total Fluid 4.9 g/dL            10/11/2023 1355 10/11/2023 1719 Cell Count Body Fluid [[id number]]    (Abnormal)   Pericardial Fluid from Pericardium    Final result Component Value Units   Color Red    Clarity Bloody    Cell Count Fluid Source Pericardium    Total  Nucleated Cells 2,558 /uL            10/11/2023 1355 10/11/2023 1719 Differential Body Fluid [[id number]]    Pericardial Fluid from Pericardium    Final result Component Value Units   % Neutrophils 62 %   % Lymphocytes 16 %   % Monocyte/Macrophages 18 %   % Eosinophils 4 %                   IMAGING:  CT Chest PE, A/P on 10/5/23  IMPRESSION:  1.  Large pericardial effusion. This measures 25 mm in greatest depth which is suggestive of at least 500 mL of fluid.  2.  Pulmonary arteries are increased in size with the main pulmonary artery measuring 39 mm. This is consistent with pulmonary arterial hypertension. Segmental and subsegmental pulmonary artery emboli are seen to the left upper lobe. No definite right   heart strain.  3.  Small bilateral pleural effusions with the right effusion appearing chronically loculated with pleural thickening. Bilateral lung consolidations right greater than left suspicious for multifocal pneumonia.  4.  Prior liver transplant. Multiple upper abdominal collaterals and splenomegaly suggestive of portal hypertension, with small to moderate abdominopelvic ascites.  5.  Prominent gas and fluid-filled loops of small and large bowel likely reactive ileus.     US LE on 10/5/23  IMPRESSION:   The right CFV and GSV are unable to be assessed due to the existing  arterial line. Exam is otherwise negative for deep vein thrombosis  bilaterally.     US RUE on 10/5/23  IMPRESSION:   1. Negative for right upper extremity DVT.  2. Short segment DVT in the right cephalic vein at the antecubital  fossa, which may be related to prior intravenous access.     US LUE on 10/5/23  IMPRESSION:  1. Negative for DVT in the left upper extremity.  2. Patent AV fistula with good blood flow volume of 1920 mL/min.  Moderate stenosis in the mid to upper outflow cephalic vein with  diameter reduced to 2.6 mm.     CXR on 10/5/23  IMPRESSION: Cardiac enlargement. Dual-lumen central line tip right atrium. Bibasilar  atelectasis or infiltrate and small effusions.

## 2023-10-18 NOTE — PROGRESS NOTES
[facility]    Infectious Disease Progress Note    Date of Service (when I saw the patient): 10/18/2023     Assessment & Plan   Blanco Osborne is a 58 year old who was admitted on 10/5/2023.     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 s/p liver transplant  in 2016 now with progressive cirrhosis  with evidence of portal hypertension   Other PMH include: paroxysmal atrial fibrillation on Eliquis, DM type 2, history of CVA, hypertension, CHRISTIAN on BiPAP, chronic pericardial effusion, ESRD on dialysis '  Presented with chest discomfort and palpitations  Found to have pericardial fluid which was tapped, no positive micro   Peritoneal fluid tapped again no positive micro   Previously asitic fluid has been checked for AFB organism none found   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          Gurwinder Lanza MD    Interval History   No fever   Abdominal pain is better today though abdomen is more distended then yesterday   Wbc is normal   Back on  antibiotics due to elevated cell count.     Physical Exam   Temp: 98.2  F (36.8  C) Temp src: Oral BP: 90/55 Pulse: 75   Resp: 19 SpO2: 94 % O2 Device: None (Room air)    Vitals:    10/16/23 0707 10/17/23 0705 10/18/23 0633   Weight: 89.2 kg (196 lb 9.6 oz) 85.1 kg (187 lb 9.6 oz) (!) 190.8 kg (420 lb 10.2 oz)     Vital Signs with Ranges  Temp:  [97.1  F (36.2  C)-98.6  F (37  C)] 98.2  F (36.8  C)  Pulse:  [70-82] 75  Resp:  [13-27] 19  BP: ()/(48-79) 90/55  SpO2:  [90 %-95 %] 94 %    GENERAL APPEARANCE: frail appearing male   HENT: Mouth without ulcers or lesions  RESP: lungs clear to auscultation   ABDOMEN:  distended  MS: extremities normal- no gross deformities noted  SKIN: no suspicious lesions or rashes    Medications    - MEDICATION INSTRUCTIONS -        - MEDICATION INSTRUCTIONS for Dialysis Patients -   Does not apply See Admin Instructions    apixaban ANTICOAGULANT  5 mg Oral BID    ceFEPIme  1 g Intravenous Q24H    gabapentin  300 mg Oral At Bedtime    guaiFENesin  600 mg Oral BID    lacosamide  100 mg Oral QPM    lacosamide  50 mg Oral QAM    melatonin  3 mg Oral At Bedtime    midodrine  10 mg Oral TID w/meals    multivitamin RENAL  1 capsule Oral Daily    - MEDICATION INSTRUCTIONS -   Does not apply Once    pantoprazole  40 mg Oral QAM AC    sevelamer carbonate  800 mg Oral 4x Daily    sodium chloride 0.9%  250 mL Intravenous Once in dialysis/CRRT    sodium chloride 0.9%  300 mL Hemodialysis Machine Once    tacrolimus  0.5 mg Oral QPM    tacrolimus  1 mg Oral QAM       Data   All microbiology laboratory data reviewed.  Recent Labs   Lab Test 10/17/23  0633 10/14/23  1042 10/13/23  1155   WBC 7.6 8.4 6.5   HGB 7.9* 7.9* 8.3*   HCT 25.0* 25.2* 25.9*    100 98   * 145* 143*     Recent Labs   Lab Test 10/17/23  0633 10/16/23  2228 10/15/23  0747   CR 5.68* 5.09* 6.38*     No lab results found.  Recent Labs   Lab Test 09/28/16  1600 09/23/16  1458 09/20/16  1901   CULT No VRE isolated No growth Culture negative for acid fast bacilli  Assayed at [employer] [employer] [address]    Culture negative after 4 weeks  No anaerobes isolated  No growth       All cultures:  Recent Labs   Lab 10/16/23  1511 10/12/23  1113 10/11/23  1355   CULTURE No growth after 1 day  No growth after 1 day No Growth No Growth      Blood culture:  Results for orders placed or performed during the hospital encounter of 10/05/23   Blood Culture Peripheral Blood    Specimen: Peripheral Blood   Result Value Ref Range    Culture No Growth    Blood Culture Peripheral Blood    Specimen: Peripheral Blood   Result  Value Ref Range    Culture No Growth    Results for orders placed or performed during the hospital encounter of 01/21/23   Blood Culture Arm, Right    Specimen: Arm, Right; Blood   Result Value Ref Range    Culture No Growth    Blood Culture Peripheral Blood    Specimen: Peripheral Blood   Result Value Ref Range    Culture No Growth    Results for orders placed or performed during the hospital encounter of 12/29/22   Blood Culture Hand, Right    Specimen: Hand, Right; Blood   Result Value Ref Range    Culture No Growth    Blood Culture Hand, Left    Specimen: Hand, Left; Blood   Result Value Ref Range    Culture No Growth    Blood Culture Peripheral Blood    Specimen: Peripheral Blood   Result Value Ref Range    Culture No Growth    Blood Culture Arm, Left    Specimen: Arm, Left; Blood   Result Value Ref Range    Culture No Growth    Results for orders placed or performed during the hospital encounter of 12/16/22   Blood Culture Peripheral Blood    Specimen: Peripheral Blood   Result Value Ref Range    Culture No Growth    Blood Culture Arm, Right    Specimen: Arm, Right; Blood   Result Value Ref Range    Culture No Growth    Results for orders placed or performed during the hospital encounter of 12/16/22   Blood Culture Peripheral Blood    Specimen: Peripheral Blood   Result Value Ref Range    Culture No Growth    Results for orders placed or performed during the hospital encounter of 11/12/22   Blood Culture Line, venous    Specimen: Line, venous; Blood   Result Value Ref Range    Culture No Growth    Blood Culture Hand, Right    Specimen: Hand, Right; Blood   Result Value Ref Range    Culture No Growth    Blood Culture Hand, Right    Specimen: Hand, Right; Blood   Result Value Ref Range    Culture No Growth    Blood Culture Hand, Left    Specimen: Hand, Left; Blood   Result Value Ref Range    Culture No Growth    Blood Culture Peripheral Blood    Specimen: Peripheral Blood   Result Value Ref Range    Culture No  Growth    Blood Culture Peripheral Blood    Specimen: Peripheral Blood   Result Value Ref Range    Culture Positive on the 1st day of incubation (A)     Culture Streptococcus pneumoniae (AA)        Susceptibility    Streptococcus pneumoniae - KARAN     Levofloxacin 1.5 Susceptible ug/mL     Meropenem 0.012 Susceptible ug/mL     Vancomycin 0.50 Susceptible ug/mL     Penicillin (meningitis) 0.016 Susceptible ug/mL     Penicillin (non-meningitis) 0.016 Susceptible ug/mL     Penicillin(oral) 0.016 Susceptible ug/mL     Ceftriaxone (non-meningitis) 0.016 Susceptible ug/mL     Ceftriaxone (meningitis) 0.016 Susceptible ug/mL     *Note: Due to a large number of results and/or encounters for the requested time period, some results have not been displayed. A complete set of results can be found in Results Review.      Urine culture:  No results found. However, due to the size of the patient record, not all encounters were searched. Please check Results Review for a complete set of results.

## 2023-10-18 NOTE — PROGRESS NOTES
Renal Medicine Inpatient Dialysis Note                                Blanco Osborne MRN# [id number]   Age: 58 year old YOB: 1965   Date of Admission: 10/5/2023 Hospital LOS: 13          Assessment/Plan:     1.  ESRD on HD      MWF. Deirdre DOBSON . Dr. Augustine weems. EDW 85.5kg. 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 Still has [facility] TD. Called his unit, they prefer us to use the catheter for his run here tomorrow. No heparin.      2.  CKD MBD   Continue PTA renvela.      3.  Anemia of CKD     4.   Pericardial effusion, ascites.      3. Recent Sepsis syndrome.  Was in ICU on pressors. ON zosyn for possible SBP. Improved hemodynamics on 15mg mg TID midodrine. addition dose 10mg mid run during HD.      3.  PE.      4.  Hx liver transplant. New ascites, portal HTN      Continue MWF schedule   Next 10/20/23      Interval History:     Dialysis run parameters reviewed with dialysis RN at patient bedside.  Seen on run  Comfortable  SBP 90 to 100 range    3.5 hours  2K  UF 2.5 liter    Pending transfer to [facility]      ROS     GENERAL: NAD, No fever,chills  R: NEGATIVE for significant cough or SOB  CV: NEGATIVE for chest pain, palpitations  EXT: no change edema  ROS otherwise negative    Dialysis Parameters:     Vitals were reviewed  Patient Vitals for the past 8 hrs:   BP Temp Temp src Pulse Resp SpO2 Weight   10/18/23 0915 (!) 88/66 -- -- 75 18 -- --   10/18/23 0900 99/72 -- -- 77 27 -- --   10/18/23 0845 96/70 -- -- 75 21 -- --   10/18/23 0830 (!) 85/59 -- -- 71 14 -- --   10/18/23 0815 91/65 -- -- 73 17 -- --   10/18/23 0800 95/65 -- -- 75 18 -- --   10/18/23 0755 115/64 -- -- 82 23 94 % --   10/18/23 0745 116/79 -- -- -- -- -- --   10/18/23 0739 (!) 86/50 98.2  F (36.8  C) Oral 74 16 95 % --   10/18/23 0633 -- -- -- -- -- -- (!) 190.8 kg (420 lb 10.2 oz)   10/18/23 0233 98/58 98.3  F (36.8  C) Oral 71 18 90 % --      I/O last 3 completed shifts:  In: 840 [P.O.:840]  Out: -     Vitals:    10/15/23 0626 10/15/23 1639 10/16/23 0707 10/17/23 0705   Weight: 88.4 kg (194 lb 12.8 oz) 88.9 kg (195 lb 14.4 oz) 89.2 kg (196 lb 9.6 oz) 85.1 kg (187 lb 9.6 oz)    10/18/23 0633   Weight: (!) 190.8 kg (420 lb 10.2 oz)       Current Weight: 89.2  Dry Weight: 86  Dialysis Temp: 36.5  C  Access Device: IJ  Access Site: right  Dialyzer: Revaclear  Dialysis Bath: 2  Sodium Profile: n  UF Goal: 2.5  Blood Flow Rate (mL/min): 400  Total Treatment Time (hrs): 3.5  Heparin: Low dose as required      EPO dose: n  Zemplar: n  IV Fe: n      Medications and Allergies:     Reviewed      Physical Exam:     Seen and examined during course of dialysis run    BP (!) 88/66   Pulse 75   Temp 98.2  F (36.8  C) (Oral)   Resp 18   Ht 1.829 m (6')   Wt (!) 190.8 kg (420 lb 10.2 oz)   SpO2 94%   BMI 57.05 kg/m      GENERAL: awake, alert, follows  HEENT: NC/AT, PERRLA, EOMI, non icteric, pharynx moist without lesion  NECK: supple, no masses or adenopathy  RESP: clear  CV: RRR, normal S1 S2  ABDOMEN: S/NT, BS present  MS: no edema  SKIN: catheter site clean without drainage  NEURO: speech normal and cranial nerves 2-12 intact  PSYCH: affect normal/bright    Data:       Recent Labs   Lab 10/17/23  0633   *   POTASSIUM 4.7   CHLORIDE 94*   CO2 22   ANIONGAP 16*   GLC 99   BUN 47.8*   CR 5.68*   GFRESTIMATED 11*   KELLY 8.7     Recent Labs   Lab 10/17/23  0633 10/16/23  2228   POTASSIUM 4.7 4.4     Recent Labs   Lab 10/17/23  0633 10/16/23  2228 10/15/23  0747 10/14/23  1042   ALBUMIN 2.8* 3.2* 3.0* 2.9*     Recent Labs   Lab 10/17/23  0633 10/14/23  1042 10/13/23  1155 10/12/23  0644   HGB 7.9* 7.9* 8.3* 8.0*         G Jose Reilly MD    [facility] - Nephrology  [phone]

## 2023-10-18 NOTE — PROGRESS NOTES
Goal Outcome Evaluation:     Summary:  Presented to the ER on 10/05 with palpations, hypotension  and chest discomfort. Found to have acute hypoxic respiratory failure and severe sepsis with septic shock     Primary Diagnosis: Severe sepsis with septic shock - due to multifactorial pneumonia, hypotension  Hx:ESRD, on HD (MW,F), post liver transplant in 2016, pericardial effusion, anxiety, RLS, seizure disorder, CHRISTIAN, stroke, A-fib, pleural effusion      Date/time: 10/17/23-10/18/23 [id number]  Orientation: A+Ox4, forgetful at times   Aggression Stop Light: Green  Mobility: SBA   Tele: NSR  Pain Management:  PRN tylenol.  Diet: Low saturated fat, Na < 2400  Bowel/Bladder: Continent of B/B. Minimal UOP (baseline).On HD  Abnormal Lab/Assessments: Mild hypotension, improved with fluid intake, VSS on RA . Na 132, Hgb 7.9, Creatinine 5.68, hematocrit 25.0   Drain/Device/Wound: L dialysis fistula , R chest CVC, L groin & neck insertion sites (CDI). R PIV-SL. Bruises on L hand, R groin and perineum. Mepilex left elbow for skin tear   Consults: ID, cardio, PT,OT, Nephro   D/C Day/Goals/Place: TBD  Other info: PT to have dialysis today. Requested PRN midodrine prior to dialysis. Abd distended. Hardened tissue in R groin. L groin site CDI. CMS intact.

## 2023-10-19 ENCOUNTER — APPOINTMENT (OUTPATIENT)
Dept: OCCUPATIONAL THERAPY | Facility: CLINIC | Age: 58
DRG: 871 | End: 2023-10-19
Payer: COMMERCIAL

## 2023-10-19 ENCOUNTER — APPOINTMENT (OUTPATIENT)
Dept: ULTRASOUND IMAGING | Facility: CLINIC | Age: 58
DRG: 871 | End: 2023-10-19
Attending: STUDENT IN AN ORGANIZED HEALTH CARE EDUCATION/TRAINING PROGRAM
Payer: COMMERCIAL

## 2023-10-19 LAB
ALBUMIN SERPL BCG-MCNC: 3 G/DL (ref 3.5–5.2)
ALP SERPL-CCNC: 203 U/L (ref 40–129)
ALT SERPL W P-5'-P-CCNC: 5 U/L (ref 0–70)
ANION GAP SERPL CALCULATED.3IONS-SCNC: 14 MMOL/L (ref 7–15)
AST SERPL W P-5'-P-CCNC: 15 U/L (ref 0–45)
BILIRUB SERPL-MCNC: 0.4 MG/DL
BUN SERPL-MCNC: 49.4 MG/DL (ref 8–23)
CALCIUM SERPL-MCNC: 8.8 MG/DL (ref 8.6–10)
CHLORIDE SERPL-SCNC: 95 MMOL/L (ref 98–107)
CREAT SERPL-MCNC: 5.52 MG/DL (ref 0.67–1.17)
DEPRECATED HCO3 PLAS-SCNC: 22 MMOL/L (ref 22–29)
EGFRCR SERPLBLD CKD-EPI 2021: 11 ML/MIN/1.73M2
GLUCOSE SERPL-MCNC: 142 MG/DL (ref 70–99)
POTASSIUM SERPL-SCNC: 4.4 MMOL/L (ref 3.4–5.3)
PROT SERPL-MCNC: 6.4 G/DL (ref 6.4–8.3)
SODIUM SERPL-SCNC: 131 MMOL/L (ref 135–145)
TACROLIMUS BLD-MCNC: 4.9 UG/L (ref 5–15)
TME LAST DOSE: ABNORMAL H
TME LAST DOSE: ABNORMAL H

## 2023-10-19 PROCEDURE — 250N000011 HC RX IP 250 OP 636: Performed by: PHYSICIAN ASSISTANT

## 2023-10-19 PROCEDURE — 0W9G3ZZ DRAINAGE OF PERITONEAL CAVITY, PERCUTANEOUS APPROACH: ICD-10-PCS | Performed by: INTERNAL MEDICINE

## 2023-10-19 PROCEDURE — 120N000001 HC R&B MED SURG/OB

## 2023-10-19 PROCEDURE — 250N000013 HC RX MED GY IP 250 OP 250 PS 637: Performed by: HOSPITALIST

## 2023-10-19 PROCEDURE — 250N000011 HC RX IP 250 OP 636: Performed by: INTERNAL MEDICINE

## 2023-10-19 PROCEDURE — 99232 SBSQ HOSP IP/OBS MODERATE 35: CPT | Performed by: PHYSICIAN ASSISTANT

## 2023-10-19 PROCEDURE — 80053 COMPREHEN METABOLIC PANEL: CPT | Performed by: HOSPITALIST

## 2023-10-19 PROCEDURE — 250N000009 HC RX 250: Performed by: INTERNAL MEDICINE

## 2023-10-19 PROCEDURE — 80197 ASSAY OF TACROLIMUS: CPT | Performed by: DIETITIAN, REGISTERED

## 2023-10-19 PROCEDURE — 99233 SBSQ HOSP IP/OBS HIGH 50: CPT | Performed by: STUDENT IN AN ORGANIZED HEALTH CARE EDUCATION/TRAINING PROGRAM

## 2023-10-19 PROCEDURE — 82947 ASSAY GLUCOSE BLOOD QUANT: CPT | Performed by: HOSPITALIST

## 2023-10-19 PROCEDURE — 272N000706 US PARACENTESIS WITH ALBUMIN

## 2023-10-19 PROCEDURE — 250N000012 HC RX MED GY IP 250 OP 636 PS 637: Performed by: INTERNAL MEDICINE

## 2023-10-19 PROCEDURE — 97110 THERAPEUTIC EXERCISES: CPT | Mod: GO

## 2023-10-19 PROCEDURE — P9047 ALBUMIN (HUMAN), 25%, 50ML: HCPCS | Performed by: PHYSICIAN ASSISTANT

## 2023-10-19 PROCEDURE — 36415 COLL VENOUS BLD VENIPUNCTURE: CPT | Performed by: HOSPITALIST

## 2023-10-19 PROCEDURE — 97530 THERAPEUTIC ACTIVITIES: CPT | Mod: GO

## 2023-10-19 RX ORDER — ALBUMIN (HUMAN) 12.5 G/50ML
25 SOLUTION INTRAVENOUS ONCE
Status: COMPLETED | OUTPATIENT
Start: 2023-10-19 | End: 2023-10-19

## 2023-10-19 RX ORDER — LIDOCAINE HYDROCHLORIDE 10 MG/ML
10 INJECTION, SOLUTION EPIDURAL; INFILTRATION; INTRACAUDAL; PERINEURAL ONCE
Status: COMPLETED | OUTPATIENT
Start: 2023-10-19 | End: 2023-10-19

## 2023-10-19 RX ADMIN — OXYCODONE HYDROCHLORIDE 2.5 MG: 5 TABLET ORAL at 20:42

## 2023-10-19 RX ADMIN — TACROLIMUS 1 MG: 5 CAPSULE ORAL at 08:06

## 2023-10-19 RX ADMIN — ALBUMIN HUMAN 25 G: 0.25 SOLUTION INTRAVENOUS at 11:32

## 2023-10-19 RX ADMIN — CEFEPIME HYDROCHLORIDE 1 G: 1 INJECTION, POWDER, FOR SOLUTION INTRAMUSCULAR; INTRAVENOUS at 12:26

## 2023-10-19 RX ADMIN — MIDODRINE HYDROCHLORIDE 10 MG: 5 TABLET ORAL at 08:02

## 2023-10-19 RX ADMIN — MIDODRINE HYDROCHLORIDE 10 MG: 5 TABLET ORAL at 16:50

## 2023-10-19 RX ADMIN — APIXABAN 5 MG: 5 TABLET, FILM COATED ORAL at 21:25

## 2023-10-19 RX ADMIN — GABAPENTIN 300 MG: 300 CAPSULE ORAL at 21:25

## 2023-10-19 RX ADMIN — LACOSAMIDE 50 MG: 50 TABLET, FILM COATED ORAL at 08:01

## 2023-10-19 RX ADMIN — LIDOCAINE HYDROCHLORIDE 10 ML: 10 INJECTION, SOLUTION EPIDURAL; INFILTRATION; INTRACAUDAL; PERINEURAL at 10:11

## 2023-10-19 RX ADMIN — SEVELAMER CARBONATE 800 MG: 800 TABLET, FILM COATED ORAL at 11:06

## 2023-10-19 RX ADMIN — SEVELAMER CARBONATE 800 MG: 800 TABLET, FILM COATED ORAL at 07:34

## 2023-10-19 RX ADMIN — APIXABAN 5 MG: 5 TABLET, FILM COATED ORAL at 08:01

## 2023-10-19 RX ADMIN — GUAIFENESIN 600 MG: 600 TABLET, EXTENDED RELEASE ORAL at 08:01

## 2023-10-19 RX ADMIN — MIDODRINE HYDROCHLORIDE 10 MG: 5 TABLET ORAL at 11:06

## 2023-10-19 RX ADMIN — PANTOPRAZOLE SODIUM 40 MG: 40 TABLET, DELAYED RELEASE ORAL at 06:45

## 2023-10-19 RX ADMIN — GUAIFENESIN 600 MG: 600 TABLET, EXTENDED RELEASE ORAL at 21:25

## 2023-10-19 RX ADMIN — Medication 1 CAPSULE: at 08:01

## 2023-10-19 RX ADMIN — LACOSAMIDE 100 MG: 50 TABLET, FILM COATED ORAL at 20:38

## 2023-10-19 RX ADMIN — MELATONIN TAB 3 MG 3 MG: 3 TAB at 21:25

## 2023-10-19 RX ADMIN — SEVELAMER CARBONATE 800 MG: 800 TABLET, FILM COATED ORAL at 16:50

## 2023-10-19 RX ADMIN — TACROLIMUS 0.5 MG: 5 CAPSULE ORAL at 16:50

## 2023-10-19 RX ADMIN — OXYCODONE HYDROCHLORIDE 2.5 MG: 5 TABLET ORAL at 08:30

## 2023-10-19 ASSESSMENT — ACTIVITIES OF DAILY LIVING (ADL)
ADLS_ACUITY_SCORE: 26
ADLS_ACUITY_SCORE: 26
ADLS_ACUITY_SCORE: 27
ADLS_ACUITY_SCORE: 27
ADLS_ACUITY_SCORE: 26
ADLS_ACUITY_SCORE: 26
ADLS_ACUITY_SCORE: 27
ADLS_ACUITY_SCORE: 26
ADLS_ACUITY_SCORE: 27
ADLS_ACUITY_SCORE: 26

## 2023-10-19 NOTE — PLAN OF CARE
Occupational Therapy Discharge Summary    Reason for therapy discharge:    All goals and outcomes met, no further needs identified.    Progress towards therapy goal(s). See goals on Care Plan in [facility] electronic health record for goal details.  Goals met    Therapy recommendation(s):    Pt making good progress. pt limited due to weakness, activity tolerance, anticipate pt will be able to return home with family A with ADL/IADl's as needed ie A with tub/showering, shopping, community mobility, cleaning, laundry, etc and home OT for home safety with ADL/IADl's, UE strengthening, etc and PT for balance and strengthening. Eventual OP OT and PT recommended to cont with higher level ex to prepare for possible kidney transplant.

## 2023-10-19 NOTE — PLAN OF CARE
[id number]  Pt up in room/hallway ind.  Had para this am with 4 L removed.  BPs soft, on midodrine.  Creat 5.51, plan for dialysis tomorrow.  Oxy once this shift for pain.  Eating/drinking well.  Tele dc'd.  Dialysis catheter in right chest.  Fistula in left arm.  Skin with bruises, scabs.  Awaiting transfer to [facility] when bed available.

## 2023-10-19 NOTE — PROGRESS NOTES
[facility]    Medicine Progress Note - Hospitalist Service    Date of Admission:  10/5/2023  Date of Service: 10/19/2023    Assessment & Plan     Blanco Osborne is a 58 year old male with extensive medical history that includes liver transplant in 2016 due to alcoholic liver disease, now with progressive cirrhosis and ascites, paroxysmal atrial fibrillation, on anticoagulation with Eliquis, diabetes mellitus type 2, previous CVA, hypertension, CHRISTIAN on BiPAP, chronic pericardial effusion, ESRD on hemodialysis who presented on 10/5/2023 with palpitations and chest discomfort.       He was hypotensive and had wide-complex tachycardia felt to be atrial fibrillation with aberrancy.  He failed 2 attempts of emergent cardioversion.  He was started on amiodarone and subsequently converted to sinus rhythm. He is now on po amiodarone     He received IV fluids but remained hypotensive and subsequently received pressors that were discontinued on 10/7. He had severe sepsis likely due to SBP and pneumonia. Blood and ascitic fluid cultures were negative. He was intially on vancomycin and zosyn. Vancomycin was discontinued on 10/8.     Troponins were elevated, felt to be demand ischemia.  Echo showed pericardial effusion without tamponade.  CT C/A/P showed PE and pulmonary infiltrates.  He was started on IV heparin.      Acute hypoxic respiratory failure-Resolved - multifactorial  -Clinically patient is on room air since yesterday night and was a combination of likely pneumonia versus fluid overload    Severe sepsis with septic shock - due to multifactorial pneumonia -resolved  Chronic hypotension, on midodrine  -Is normally on midodrine for chronic hypotension    -Initially presented with septic shock which has now resolved.  Off pressors since 10/7/2023.  Blood pressure currently in normal range  -Continue midodrine for chronic hypotension and of note patient on nondialysis takes it on  as needed  basis and after he was transferred from ICU he has been on scheduled 15 three times daily and in spite of that he does get hypotensive at times  -Note patient has been afebrile since he has been in the hospital, has completed 8 days of antibiotics, WBC count continues to be stable, no SBP on the ascitic fluid but in spite of that he continues to have lower blood pressure and he is on very high dose of midodrine as started by the ICU team  -Random cortisol level checked on 10/13 have been normal  -Status 25 g of 25% albumin on 10/14  -Dose of midodrine was cut down to 10 mg 3 times daily scheduled on 10/14 evening  -Infectious disease team was consulted yesterday and they wanted to see if patient can be transferred to [facility] currently they do not have beds and I did talk to transplant ID team as below  -We will monitor how his blood pressures are during the course of the day and patient only takes midodrine on as-needed basis and does take it for sure on days of dialysis    Infectious disease   -Blood cultures on 10/6/2023 have been negative  -C. difficile has been negative during this hospital stay  -Ascitic tap was done on 10/6 and aerobic, anaerobic have been negative and fungal or yeast have been negative so far and is predominantly lymphocytic and triglycerides are high and negative for malignancy  -Ascites tap on 10/10 the aerobic cultures are negative and is predominantly lymphocytic  -Pericardial tap on 10/11-negative for malignancy, Gram stain is negative, aerobic bacterial culture is negative  -Ascitic tap on 10/12-predominantly lymphocytic and triglycerides, flow cytometry shows polytypic B cells, aerobic cultures have not shown any growth  - He was checked for AFB in the ascitic fluid in January 2023  - Case discussed with transplant hepatology and noted transplant ID's request [facility]. Working on transfer  - Continue cefepime per ID   - respiratory viral panel is  negative  - Strongyloides antibody ordered and pending rest  - No history of pets, exposure to ticks or exotic travel  - CMV is negative     End-stage liver disease, s/p liver transplant in 2016, now with recurrent ascites and Portal hypertension likely due to cirrhosis(status biopsy on 12/22 which showed poorly preserved liver with evidence of cirrhosis  s/p paracentesis on 10/6 and 10/11  Chylous ascites/ effusion  -On presentation on admission patient was found to have effusion and paracentesis was done and the there were 1119 nucleated cells and triglycerides were elevated at more than 400 and it was chylous effusion and patient has been treated with IV Zosyn for presumed SBP  - his differential count from 10/6 shows predominantly lymphocytes(77 per cent) and ANC is only 11.2 and he does not have SBP and his cultures have been negative including aerobic, anaerobic and fungal  -Status repeat paracentesis on 10/10 and 4.6 L of fluid was removed and triglycerides are elevated at 503, chylous effusion, local cultures again are negative and cell count  showed 761 nucleated cells with 73% lymphocytes, 22% monocytes and ANC of 26.6  -GI team from [facility] has been following the patient and they recommended right heart cath his concern was that his ascites started at the time when he had cardiopulmonary symptoms and If his pressures are not elevated then future test may include transjugular liver biopsy and or hepatic vein wedge pressure measurement and that decision will be up to the transplant team of Dr. Simms and they are in touch with him  -right heart cath on 10/11 which shows normal right and left-sided filling pressures, mild elevated pulmonary pressures and portal hypertension with hepatic vein pressure gradient of 10 mmHg  -Most likely cause of portal hypertension is cirrhosis as evident on biopsy done on 12/22  - 10/12-10/12-Tacrolimus levels were high and transplant hepatology has been managing his  tacrolimus    -As per GI team note dated 10/13 on next Repeat the sciatic studies including triglyceridesTG (to evaluate if improved post restriction to low fat diet), cell count with diff, cultures, Alb, T.PRO.  -If TG improved on next paracentesis, would continue very low fat (LCT) diet for 2-3 weeks then liberalize fat restriction for LCT challenge and repeat TG on next paracentesis to evaluate if chylous ascites persists.  -If chylous ascites persists despite compliance to low fat diet, consider IR consult for lymphoscintigraphy (possibly with SPECT CT) to locate site of lymphatic leak for embolization of leak.   -No MCT supplementation in the setting of Cirrhosis (high risk for narcosis)   -Calories count  -Nutrition consulted  -10/14-d/w with Dr. Leventhal from transplant hepatology and he did start the patient back on tacrolimus at 1 mg and 0.5 mg and repeat levels to be done on Monday or Tuesday and I did discuss albumin and he generally prefers 25% given that volume is less  -10/16 -> repeat paracentesis with ascites studies: triglcyerides, AFB culture and stain, fungal cultures, cell count with diff, cultures, albumin and total protein. Give 25g IV albumin given symptomatic hypotension following HD and paracentesis    - Dscussed with transplant hepatology and noted transplant ID's request to transfer pt to [facility]. Working on transfer, currently on waitlist.     Multifocal pneumonia, organism unspecified  - CT scan showed pulmonary infiltrates   - COVID 19/influenza/RSV negative  - blood cultures negative till date from 10/5  - unable to provide sputum specimen  -vancomycin was discontinued on 10/8  -Patient received total of 7 to 8 days of IV Zosyn and then another day Augmentin and it was discontinued with last dose on 10/13    Diarrhea-improved  -C. difficile has been negative on 10/10     Pulmonary embolism in segmental and subsegmental pulmonary arteries of left upper lobe, without cor pulmonale -  Short  segment  thrombophlebitis in right cephalic vein at antecubital fossa, likely related to prior intravenous access  -Eliquis held on admission and was started on IV heparin  -Continue with Eliquis which was restarted on     Recurrent chronic pericardial effusion, s/p pericardiocentesis on 9/29.  Drain was removed on 9/30.   Status repeat pericardiocentesis on 10/11  - Felt to be due to volume overload due to renal failure   -  TTE 10/7 showed moderate pericardial effusion, slightly larger than seen on 10/5, with no evidence of tamponade physiology  - echo on 10/9 showed moderate to large bloody cardial effusion and as compared to 10/7 it has increased, IVC is mildly dilated, mild RV diastolic collapse and has inflow velocity variation consistent with hemodynamic compromise  -Status repeat pericardiocentesis with removal of 560 mL of fluid and it was bloody, red and there were 2558 nucleated cells, no organisms, 2 WBCs, glucose of 79, total protein of 4.9 and albumin level of 2.7 and ADA is pending along with cytology and he had post procedure repeat echo which showed excellent results after removal of fluid  -Drain was removed on 10/12  -Repeat echo as per cardiology -> Repeat echocardiogram tomorrow   -If patient has repeat pericardiocentesis in future then sent for AFB/fungal culture, 16 S and 28 S    Recurrent chest discomfort  -He has been having on and off chest discomfort and initially during the course of hospitalization his troponin did peak at 346 on 10/5 and it was thought to be due to demand because of shock and EKG did not had any ST elevation and patient had rapid response yesterday and repeat troponin yesterday showed troponins have trended down to 307  -Echo as above  - discussed with cardiology and as per them his chest discomfort was likely due to pericardial effusion  -In case in future he has recurrent chest discomfort he will need ischemic work-up  -Patient again and had episode of chest  discomfort on 10/14 and was present on palpation and is similar to prior episodes and troponin was checked and was 320 and a repeat this morning is similar and has trended down and EKG did not show any ST elevation  -Discussed with the cardiology team and we will have them see him again tomorrow     Chronic paroxysmal atrial fibrillation, on anticoagulation with Eliquis  Wide-complex tachycardia on admission felt to be atrial fibrillation with aberrant conduction, failed cardioversion x 2  -Patient was started on amiodarone and converted to normal sinus rhythm and is currently on oral amiodarone  -Family did wanted to discuss about ongoing use of amiodarone as it has a lot of side effects and can interfere with tacrolimus levels and we will consult cardiology team again for tomorrow morning     ESRD, on hemodialysis q. Monday, Wednesday, Friday  S/p left AV fistula pseudoaneurysm repair, 8/2023  His AV fistula on the left upper extremity infiltrated on 10/6-  - continue dialysis per nephrology and continue with renal multivitamin and sevelamer     Small bilateral pleural effusions  Chronically loculated right pleural effusion  - stable       Generalized anxiety disorder  Restless leg syndrome  -On hydroxyzine and ropinirole     Chronic anemia-due to anemia of chronic disease  -Hemoglobin is baseline between 8-9  -Hemoglobin is stable today at 7.9 on 10/15  -We will check labs intermittently     chronic thrombocytopenia  -Platelet count this morning is 145    Seizure disorder  - continue Vimpat     GERD  -Continue PPI     Probable CHRISTIAN  --Per patient's wife, he had a sleep study about 10 years ago but does not have any CPAP or BiPAP at home  -Did use BiPAP last night  -Need to  undergo sleep study as outpatient after discharge     Previous embolic strokes     Generalized weakness  -Continue with physical therapy and Occupational Therapy.     Pain at multiple locations  -Has pain in left upper extremity due to  infiltration of AV fistula, pain in right upper extremity due to thrombophlebitis and frequent lab draws.  Also complains of diffuse pain in shoulders and upper back  -continue Tylenol as needed  -IV Dilaudid 0.2 mg every 6 hours as needed and Percocet every 8 hours as needed     Diet: Snacks/Supplements Adult: Gelatein Plus; Between Meals  Room Service  Combination Diet 2 gm NA Diet; Very Low Fat Diet (up to 10g)  Snacks/Supplements Adult: Ensure Clear; With Meals    DVT Prophylaxis: Heparin drip  Nixon Catheter: Not present  Lines: PRESENT      Hemodialysis Vascular Access Right Subclavian-Site Assessment: WDL  Hemodialysis Vascular Access Arteriovenous fistula Left Forearm-Site Assessment: WDL;Bruit present;Thrill present      Cardiac Monitoring: None  Code Status: Full Code      Clinically Significant Risk Factors              # Hypoalbuminemia: Lowest albumin = 2.8 g/dL at 10/17/2023  6:33 AM, will monitor as appropriate  # Coagulation Defect: INR = 2.00 (Ref range: 0.85 - 1.15) and/or PTT = 47 Seconds (Ref range: 22 - 38 Seconds), will monitor for bleeding        # Hypertension: Noted on problem list        # Overweight: Estimated body mass index is 25.61 kg/m  as calculated from the following:    Height as of this encounter: 1.829 m (6').    Weight as of this encounter: 85.6 kg (188 lb 12.8 oz).     # Moderate Malnutrition: based on nutrition assessment           Disposition Plan      Expected Discharge Date: 10/20/2023      Destination: home with family;inpatient rehabilitation facility  Discharge Comments: 10-11 angio and pericardiocentesis and HD          Franky Monet MD  Hospitalist Service  [facility]  Securely message with Matthieu (more info)  Text page via [facility] Paging/Directory   _  _____________________________________________________________________    Interval History     No acute events overnight  He denies any lightheadedness or dizziness.    No CP/SOB  No fevers  Working  on transfer to [facility], waiting for bed availability  Otherwise doing well post paracentesis    Physical Exam     Temp: 98.5  F (36.9  C) Temp src: Oral BP: 98/57 Pulse: 75   Resp: 18 SpO2: 98 % O2 Device: None (Room air)       General: Patient appears comfortable and in no acute distress.  Respiratory: Lungs are clear to auscultation bilaterally with no wheeze or crackles and has right-sided permacath  Cardiovascular: Regular rate , S1 and S2 normal with no murmer or rubs or gallops  Abdomen:   soft , non tender , mild distended and bowel sounds are present   Skin: No skin rashes   Neurologic:  No facial droop  Musculoskeletal: Normal Range of motion over upper and lower extremities bilaterally   Psychiatric: cooperative          Medical Decision Making       Time spent in care of patient is 50 minutes and I discussed plan of care with the patient, nurse , and discussed plan with the patient's brother and the family     Data     I have personally reviewed the following data over the past 24 hrs:    N/A  \   N/A   / N/A     131 (L) 95 (L) 49.4 (H) /  142 (H)   4.4 22 5.52 (H) \     ALT: 5 AST: 15 AP: 203 (H) TBILI: 0.4   ALB: 3.0 (L) TOT PROTEIN: 6.4 LIPASE: N/A       Imaging results reviewed over the past 24 hrs:   Recent Results (from the past 24 hour(s))   US Paracentesis with Albumin    Narrative    US PARACENTESIS WITH ALBUMIN 10/19/2023 10:44 AM     HISTORY: ascites, abdominal pain    FINDINGS: Ultrasound was used to evaluate for the presence and best  approach for paracentesis. Written and oral informed consent was  obtained. A pause for the cause procedure to verify the correct  patient and correct procedure. The skin overlying the right lower  quadrant was prepped and draped in the usual sterile fashion. The  subcutaneous tissues were anesthetized with 10 mL 1% lidocaine. A  catheter was advanced into the peritoneal space and 4 L of  light  brown colored fluid was drained. There were no  immediate  complications. Ultrasound images were permanently stored.  Patient  left the ultrasound suite in satisfactory condition.      Impression    IMPRESSION: Technically successful paracentesis without immediate  complications.    MELINDA SALVADOR MD         SYSTEM ID:  [id number]

## 2023-10-19 NOTE — PROGRESS NOTES
"SPIRITUAL HEALTH SERVICES - Progress Note    Oncology  Saw pt Blanco Osborne and his wife, Ofe, per length of stay.     Patient/Family Understanding of Illness and Goals of Care - Blanco shared his long health journey and his current experiences with dialysis. He states his long term goal as \"receiving a kidney transplant.\"      Distress and Loss -   Blanco lamented that he \"just wants to go home\" but also understands that he \"needs to be here.\"   He also expressed concern that he \"doesn't want to be a burden or let others down.\"   Blanco says he has been asking the question \"Why Me?\" more frequently lately.    Strengths, Coping, and Resources - Blanco names his wife and brother as his primary sources of support and coping.     Meaning, Beliefs, and Spirituality - Blanco and Ofe are both children of [employer] past[employer] and count their loyd as \"a gift.\" He appreciated sharing a prayer during our time together.    Plan of Care - Spiritual Care will continue to check in with patient during his hospitalization.       Myrna Sales  Chaplain Resident    [facility] routine referrals *[phone]    [facility] available 24/7 for emergent requests/referrals, either by paging the on-call  or by entering an ASAP/STAT consult in Epic (this will also page the on-call ).   "

## 2023-10-19 NOTE — PROGRESS NOTES
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 2016 now with progressive cirrhosis (liver bx 12/2022) with evidence of portal hypertension and ascites, paroxysmal atrial fibrillation on Eliquis, DM type 2, history of CVA, hypertension, CHRISTIAN on BiPAP, chronic pericardial effusion, ESRD on dialysis who presented to [facility] ED on 10/5/23 with chest discomfort and palpitations, admitted with severe sepsis with septic shock requiring pressors secondary to multifactorial pneumonia, atrial fibrillation with aberrancy s/p 2 failed attempts at cardioversion, worsening pericardial effusion on ECHO s/p pericardiocentesis 10/11, and PE started on IV heparin. GI consulted for \"liver transplant, developing portal hypertension, initial tap concerning for sbp\".      # Cirrhosis secondary to NAFLD s/p liver transplant 2016 now with progressive cirrhosis with evidence of portal hypertension and ascites   # SBP   # Chylous ascites   # Abdominal pain   - Patient follows with Dr. Simms, though notably has not been seen in years; last clinic visit 4/21/2020 with plan for 6 month follow up. Next appt 12/2023   - Patient presented with palpitations and chest discomfort but also noted worsening abdominal distention and pain that began around the same time. He has required paracentesis in the past but stated this was back in 2016.    - PTA on tacrolimus 1mg q 12 hours. Tacro level supratherapeutic at 8 on 10/11 (desired levels 3-5), held initially then resumed at 1mg in the morning and 0.5mg in the evening. Tacro level 4.4 on 10/18. Tacro level pending   - Admit labs with normal LFTs other than mildly elevated alk phos which is elevated 203. INR 2.00 (1.55 on admission). WBC has been normal. Liver bx 12/2022 with evidence of cirrhosis, but " notably the sample was autolyzed with delayed fixation.   - Paracentesis   - 10/6: 3L removed with 1119 nucleated cells, triglycerides >400 consistent with chylous ascites. SAAG 1.7 consistent with portal hypertension. Cytology negative for malignancy. Cultures are negative including aerobic culture, anaerobic culture and fungal.   - 10/10: 4.65L removed with 761 total nucleated cells, SAAG 3.4. Cultures negative. Triglycerides 503  - 10/12: 3.4L removed with 719 total nucleated cells, SAAG 2.8. Cultures negative. Cytology negative for malignancy. Flow cytometry with polytypic B cells, triglycerides 303, lipase 37.   - 10/16: 2L max removed with 3,844 total nucleated cells, SAAG 2.1, ANC 3,190, prelim aerobic cultures NGTD, prelim fungal culture NGTD, triglycerides 332. Prelim AFB culture and stain negative  - Antibiotics: Zosyn (10/5-10/12) for pneumonia and possible SBP (no evidence of SBP on peritoneal fluid analysis 10/6), Augmentin (10/12-10/13) for pneumonia, Ceftriaxone for SBP (10/16 x1) switched to cefepime per ID (10/17- present)  - Quantiferon gold TB negative, HIV negative, EBV detected <500, CMV negative, respiratory panel negative, strongyloides ab IgG negative  - s/p RHC 10/11 which showed normal right and left sided filling pressures, mild elevated pulmonary pressures and portal hypertension with hepatic vein pressure gradient of 10mmHg      - Ascites likely related to portal HTN in the setting of cirrhosis, further c/b disruption of lymphatics (d/t increased lymph flow and portal HTN seen with cirrhosis) leading to chylous ascites as reflective of persistent TG >200 in ascites fluid.  Currently on 2gm NA and very low fat diet. He continues to have ongoing TG elevations in ascites and requiring paracentesis every 2-4 days for relief of abd distention. Suspect ascites re-accumulation exacerbated by chyle formation. High risk for developing malnutrition (PRO losses) and further immunocompromise with  "chyle losses. RD following. Would avoid MCT supplementation in the setting of cirrhosis (increased risk for narcosis/altering mentation).      # Moderate protein-energy malnutrition  - RD following for calorie counts, education on low fat diet, and assistance with increasing PRO/kcals. 3 day calorie count average with \"1666 cals (76% needs), 105 gm pro (100% needs) Calorie count reflects pt meeting at least 2/3 nutrition needs with oral intake\" per RD note 10/16     # Diarrhea, resolved  - Loose stools began 10/9, per nursing charting patient having 1-2 stools per day   - No hx of Cdiff, C diff negative 10/10  - Treated with Zosyn 10/5-10/12, Augmentin 10/12-10/13 and given one dose of vancomycin 10/5  - Could be secondary to bowel edema/poor absorption from portal hypertension, but likely antibiotics as diarrhea has resolved when antibiotics were stopped      # Recurrent pericardial effusion  # Paroxysmal atrial fibrillation   # NSTEMI  - PTA on Eliquis, now continued   - Cardiology followed, now signed off   - ECHO 10/10 with recurrent pericardial effusion slightly larger than seen on ECHO 10/8 s/p pericardiocentesis 10/11 with 560cc removed   - s/p RHC 10/11 which showed normal right and left sided filling pressures, mild elevated pulmonary pressures and portal hypertension with hepatic vein pressure gradient of 10mmHg   - Repeat ECHO 10/17 with no recurrent pericardial effusion, LVEF >70%     # Pulmonary embolism  - Noted on CT scan   - Treated with heparin gtt      # ESRD on HD  - Nephrology following, HD on MWF  - On kidney transplant list        RECOMMENDATIONS:  - Paracentesis today   - Transfer to [facility] when bed is available  - Continue empiric antibiotics per ID   - Continue very low fat diet, do not expect TG to change quickly so will not repeat TG in ascites fluid at this time   - Likely plan to continue very low fat (LCT) diet for 2-3 weeks then liberalize fat restriction for LCT challenge and repeat TG " "on next paracentesis to evaluate if chylous ascites persists.  -If chylous ascites persists despite compliance to low fat diet, consider IR consult for lymphoscintigraphy (possibly with SPECT CT) to locate site of lymphatic leak for embolization of leak.   - Follow tacrolimus trough levels; Discussed with Jacklyn Liu, transplant hepatology CNP on 10/16  -If drug level 3-5, continue Tacrolimus and continue to check level M/W/F to monitor levels.                -If drug level >5, would continue Tacrolimus at decreased dose of 0.5mg BID and recheck level daily until within therapeutic range.  - If hypotensive and sx orthostasis post paracentesis or HD runs, would rec give IV Alb, 12.5-25 g (planning to give 25g with paracentesis today)  - Appreciate RD following, continue low fat diet   - Continue PPI     GI will continue to follow    Thank you for involving us in this patient's care. Please do not hesitate to contact the GI service with any questions or concerns.     Overall time spent on the date of this encounter preparing to see the patient (including chart review of available notes, clinical status events, imaging and labs); obtaining and/or reviewing separately obtained history; ordering medications, tests or procedures; communicating with other health care professionals; and documenting the above clinical information in the electronic medical record was 45 minutes.    Martha Medrano PA-C  GI Service  [facility]  Text Page (Monday-Friday, 8am-4pm)    To page the On-Call [facility] provider 24 hours a day, please click [facility] and select GASTROENTEROLOGY MEDICINE ADULT / [facility] in the drop-down menu.   _______________________________________________________________      Subjective: Nursing notes and 24hr events reviewed. Patient reports worsening distention and \"stretching\" of his abdomen. He states it has been harder to exercise due to the distention. He is fixated o his diet and " "reports the \"kitchen\" told him he can only have \"ice\" with the diet restrictions he is on. He reports he does not follow a low sodium diet at home. Reiterated the importance of following the diet restrictions. Afebrile. Reports last bowel movement was this morning. No diarrhea, black or bloody stools.     ROS:   4 pt ROS negative unless noted in subjective.     Medications:  Current Facility-Administered Medications   Medication    - MEDICATION INSTRUCTIONS for Dialysis Patients -    acetaminophen (TYLENOL) tablet 650 mg    Or    acetaminophen (TYLENOL) Suppository 650 mg    apixaban ANTICOAGULANT (ELIQUIS) tablet 5 mg    benzocaine-menthol (CHLORASEPTIC) 6-10 MG lozenge 1 lozenge    calcium carbonate (TUMS) chewable tablet 500 mg    camphor-menthol (DERMASARRA) lotion    ceFEPIme (MAXIPIME) 1 g vial to attach to  mL bag for ADULTS or NS 50 mL bag for PEDS    glucose gel 15-30 g    Or    dextrose 50 % injection 25-50 mL    Or    glucagon injection 1 mg    gabapentin (NEURONTIN) capsule 300 mg    guaiFENesin (MUCINEX) 12 hr tablet 600 mg    HYDROmorphone (DILAUDID) injection 0.2 mg    hydrOXYzine (ATARAX) tablet 25 mg    lacosamide (VIMPAT) tablet 100 mg    lacosamide (VIMPAT) tablet 50 mg    melatonin tablet 3 mg    midodrine (PROAMATINE) tablet 10 mg    midodrine (PROAMATINE) tablet 10 mg    multivitamin RENAL (TRIPHROCAPS) capsule 1 capsule    naloxone (NARCAN) injection 0.2 mg    Or    naloxone (NARCAN) injection 0.4 mg    Or    naloxone (NARCAN) injection 0.2 mg    Or    naloxone (NARCAN) injection 0.4 mg    ondansetron (ZOFRAN ODT) ODT tab 4 mg    Or    ondansetron (ZOFRAN) injection 4 mg    oxyCODONE IR (ROXICODONE) half-tab 2.5 mg    pantoprazole (PROTONIX) EC tablet 40 mg    Patient is already receiving anticoagulation with heparin, enoxaparin (LOVENOX), warfarin (COUMADIN)  or other anticoagulant medication    QUEtiapine (SEROquel) half-tab 50 mg    rOPINIRole (REQUIP) tablet 0.5 mg    sevelamer " carbonate (RENVELA) tablet 800 mg    sodium chloride 0.9% BOLUS 250 mL    sodium chloride 0.9% BOLUS 300 mL    tacrolimus (GENERIC) suspension 0.5 mg    tacrolimus (GENERIC) suspension 1 mg       Objective:  Blood pressure 107/63, pulse 80, temperature 98.4  F (36.9  C), temperature source Oral, resp. rate 18, height 1.829 m (6'), weight 85.6 kg (188 lb 12.8 oz), SpO2 97%.  Gen: Lying in bed. Appears comfortable  HEENT: NCAT. Conjunctiva clear. Sclera anicteric   CV: Regular rate  Resp: Non-labored breathing  Abd: Soft, non tender, distended, no guarding or rebound   Skin:  No jaundice  Ext: warm, well perfused   Mental status/Psych: A&O. Asks/answers questions appropriately     PROCEDURES:  10/16: Paracentesis with 2L (max) of straw colored fluid drained. Technically successful paracentesis without immediate complications. Given 25g albumin     10/12: Paracentesis with 3.4 L of cloudy/light brown fluid drained.  There were no immediate complications.     10/10: Paracentesis with 4.65 L of  straw colored fluid was drained. There were no immediate complications.     10/6: Paracentesis with 3L  cloudy, turbid milky fluid aspirated into vacuum bottles.    LABS:  BMP  Recent Labs   Lab 10/19/23  0810 10/18/23  1306 10/17/23  0633 10/16/23  2228   * 133* 132* 131*   POTASSIUM 4.4 3.7 4.7 4.4   CHLORIDE 95* 94* 94* 92*   KELLY 8.8 8.8 8.7 8.6   CO2 22 26 22 23   BUN 49.4* 28.3* 47.8* 41.1*   CR 5.52* 3.81* 5.68* 5.09*   * 174* 99 123*     CBC  Recent Labs   Lab 10/18/23  1306 10/17/23  0633 10/14/23  1042 10/13/23  1155   WBC 5.4 7.6 8.4 6.5   RBC 2.76* 2.51* 2.51* 2.65*   HGB 8.8* 7.9* 7.9* 8.3*   HCT 27.4* 25.0* 25.2* 25.9*   MCV 99 100 100 98   MCH 31.9 31.5 31.5 31.3   MCHC 32.1 31.6 31.3* 32.0   RDW 18.2* 18.0* 16.6* 15.8*    133* 145* 143*     INR  Recent Labs   Lab 10/17/23  0633   INR 2.00*     LFTs  Recent Labs   Lab 10/19/23  0810 10/18/23  1306 10/17/23  0633 10/16/23  2228   ALKPHOS 203* 187*  142* 158*   AST 15 11 14 13   ALT 5 7 7 6   BILITOTAL 0.4 0.5 0.5 0.5   PROTTOTAL 6.4 6.6 5.9* 6.3*   ALBUMIN 3.0* 3.1* 2.8* 3.2*      PANC  Recent Labs   Lab 10/13/23  0850   LIPASE 24     CULTURES:   7-Day Micro Results       Collected Updated Procedure Result Status      10/17/2023 2010 10/17/2023 2056 Asymptomatic COVID-19 Virus (Coronavirus) by PCR Nasopharyngeal [[id number]]    Swab from Nasopharyngeal    Final result Component Value   SARS CoV2 PCR Negative   NEGATIVE: SARS-CoV-2 (COVID-19) RNA not detected, presumed negative.            10/16/2023 1511 10/16/2023 1941 Cell count with differential fluid [[id number]]    (Abnormal)   Ascites Fluid from Paracentesis    Final result Component Value   No component results            10/16/2023 1511 10/16/2023 1604 Albumin fluid [[id number]]    Ascites Fluid from Paracentesis    Final result Component Value Units   Albumin Fluid Source Abdomen    ascites  ascites   Albumin fluid 1.1 g/dL            10/16/2023 1511 10/16/2023 1604 Protein fluid [[id number]]    Ascites Fluid from Paracentesis    Final result Component Value Units   Protein Fluid Source Abdomen    ascites  ascites  ascites   Protein Total Fluid 2.6 g/dL            10/16/2023 1511 10/19/2023 0730 Ascites Fluid Aerobic Bacterial Culture Routine [[id number]]   Ascites Fluid from Abdomen    Preliminary result Component Value   Culture No growth after 2 days  [P]                10/16/2023 1511 10/16/2023 1520 Acid-Fast Bacilli Culture and Stain [[id number]]    Ascites Fluid from Abdomen    In process Component Value   No component results            10/16/2023 1511 10/18/2023 1832 Fungal or Yeast Culture Routine [[id number]]   Ascites Fluid from Abdomen    Preliminary result Component Value   Culture No growth after 2 days  [P]                10/16/2023 1511 10/16/2023 1938 Cell Count Body Fluid [[id number]]    (Abnormal)   Ascites Fluid from Paracentesis    Final result Component Value  Units   Color Orange    Clarity Turbid    Cell Count Fluid Source Abdomen    ascites  ascites   Total Nucleated Cells 3,844 /uL            10/16/2023 1511 10/18/2023 1331 Acid-Fast Bacilli Culture and Stain [[id number]]    Ascites Fluid from Abdomen    Preliminary result Component Value   Acid Fast Stain No acid fast bacilli seen  [P]    Performed By: [facility] [address] [place], [place] [address]Yfomvziyxm Director: Elvis Mattson MD, PhDCLIA Number: [id number]   Acid Fast Stain No acid fast bacilli seen  [P]    Performed By: [facility] [place], [place] [address]Bjhitmgvvx Director: Elvis Mattson MD, PhDCLIA Number: [id number]  This is an appended report. These results have been appended to a previously preliminary verified report.            10/16/2023 1511 10/16/2023 1941 Differential Body Fluid [[id number]]    (Abnormal)   Ascites Fluid from Paracentesis    Final result Component Value Units   % Neutrophils 83 %   % Lymphocytes 7 %   % Monocyte/Macrophages 10 %   Absolute Neutrophils, Body Fluid 3,190.5 /uL            10/14/2023 2101 10/15/2023 1100 Cytomegalovirus DNA by PCR, Quantitative [[id number]]    Blood from Arm, Right    Final result Component Value Units   CMV DNA IU/mL Not Detected IU/mL            10/14/2023 1808 10/14/2023 2211 Respiratory Panel PCR [[id number]]    Swab from Nasopharyngeal    Final result Component Value   Adenovirus Not Detected   Coronavirus Not Detected   This test detects Coronavirus 229E, HKU1, NL63 and OC43 but does not distinguish between them. It does not detect MERS ( Respiratory Syndrome), SARS (Severe Acute Respiratory Syndrome) or 2019-nCoV (Novel 2019) Coronavirus.   Human Metapneumovirus Not Detected   Human Rhin/Enterovirus Not Detected   Influenza A Not Detected   Influenza A, H1 Not Detected   Influenza A 2009 H1N1 Not Detected   Influenza A, H3 Not Detected   Influenza B Not Detected   Parainfluenza Virus  1 Not Detected   Parainfluenza Virus 2 Not Detected   Parainfluenza Virus 3 Not Detected   Parainfluenza Virus 4 Not Detected   Respiratory Syncytial Virus A Not Detected   Respiratory Syncytial Virus B Not Detected   Chlamydia Pneumoniae Not Detected   Mycoplasma Pneumoniae Not Detected            10/13/2023 1155 10/16/2023 1307 Quantiferon TB Gold Plus Grey Tube [[id number]]   Peripheral Blood    Final result Component Value Units   Quantiferon Nil Tube 0.00 IU/mL            10/13/2023 1155 10/16/2023 1307 Quantiferon TB Gold Plus Green Tube [[id number]]   Peripheral Blood    Final result Component Value Units   Quantiferon TB1 Tube 0.00 IU/mL            10/13/2023 1155 10/16/2023 1308 Quantiferon TB Gold Plus Yellow Tube [[id number]]   Peripheral Blood    Final result Component Value   Quantiferon TB2 Tube 0.00            10/13/2023 1155 10/16/2023 1308 Quantiferon TB Gold Plus Purple Tube [[id number]]   Peripheral Blood    Final result Component Value Units   Quantiferon Mitogen 0.60 IU/mL            10/13/2023 1155 10/16/2023 1317 Quantiferon TB Gold Plus [[id number]]   Peripheral Blood    Final result Component Value Units   Quantiferon-TB Gold Plus Negative    No interferon gamma response to M.tuberculosis antigens was detected. Infection with M.tuberculosis is unlikely, however a single negative result does not exclude infection. In patients at high risk for infection, a second test should be considered in accordance with the 2017 ATS/IDSA/CDC Clinical Pract  ice Guidelines for Diagnosis of Tuberculosis in Adults and Children    TB1 Ag minus Nil Value 0.00 IU/mL   TB2 Ag minus Nil Value 0.00 IU/mL   Mitogen minus Nil Result 0.60 IU/mL   Nil Result 0.00 IU/mL            10/12/2023 1113 10/13/2023 2103 Cell count with differential fluid [[id number]]    (Abnormal)   Ascites Fluid from Paracentesis    Final result Component Value   No component results            10/12/2023 1113 10/12/2023 1235  Albumin fluid [[id number]]    Ascites Fluid from Paracentesis    Final result Component Value Units   Albumin Fluid Source Abdomen    Albumin fluid 1.0 g/dL            10/12/2023 1113 10/12/2023 1235 Protein fluid [[id number]]    Ascites Fluid from Paracentesis    Final result Component Value Units   Protein Fluid Source Abdomen    Protein Total Fluid 2.4 g/dL            10/12/2023 1113 10/17/2023 0724 Ascites Fluid Aerobic Bacterial Culture Routine [[id number]]   Ascites Fluid from Peritoneum    Final result Component Value   Culture No Growth               10/12/2023 1113 10/16/2023 1904 Cytology, non-gynecologic [[id number]]   Ascites Fluid from Abdomen    Final result Component Value   Final Diagnosis This result contains rich text formatting which cannot be displayed here.   Clinical Information This result contains rich text formatting which cannot be displayed here.   Gross Description This result contains rich text formatting which cannot be displayed here.   Microscopic Description This result contains rich text formatting which cannot be displayed here.   Performing Labs This result contains rich text formatting which cannot be displayed here.            10/12/2023 1113 10/13/2023 2101 Cell Count Body Fluid [[id number]]    (Abnormal)   Ascites Fluid from Paracentesis    Final result Component Value Units   Color Yellow    Clarity Turbid    Body Fluid Comment Slides sent for a path review.    Cell Count Fluid Source Abdomen    Total Nucleated Cells 719 /uL            10/12/2023 1113 10/13/2023 2103 Differential Body Fluid [[id number]]    Ascites Fluid from Paracentesis    Final result Component Value Units   % Neutrophils 1 %   % Lymphocytes 88 %   % Monocyte/Macrophages 11 %   Absolute Neutrophils, Body Fluid 7.2 /uL            10/12/2023 1113 10/13/2023 1336 Triglyceride Fluid [[id number]]    Ascites Fluid from Paracentesis    Final result Component Value Units   Triglyceride Fluid Source --     Abdomen   Triglyceride Fluid 303 mg/dL            10/12/2023 1113 10/13/2023 1332 Lipase fluid [[id number]]    Ascites Fluid from Paracentesis    Final result Component Value Units   Lipase Fluid Source --    Abdomen   Lipase fluid 37 U/L            10/12/2023 1113 10/13/2023 1402 Bilirubin fluid [[id number]]    Ascites Fluid from Paracentesis    Final result Component Value Units   Bilirubin Fluid Source Abdomen    Bilirubin fluid 0.3 mg/dL                    IMAGING:  CT Chest PE, A/P on 10/5/23  IMPRESSION:  1.  Large pericardial effusion. This measures 25 mm in greatest depth which is suggestive of at least 500 mL of fluid.  2.  Pulmonary arteries are increased in size with the main pulmonary artery measuring 39 mm. This is consistent with pulmonary arterial hypertension. Segmental and subsegmental pulmonary artery emboli are seen to the left upper lobe. No definite right   heart strain.  3.  Small bilateral pleural effusions with the right effusion appearing chronically loculated with pleural thickening. Bilateral lung consolidations right greater than left suspicious for multifocal pneumonia.  4.  Prior liver transplant. Multiple upper abdominal collaterals and splenomegaly suggestive of portal hypertension, with small to moderate abdominopelvic ascites.  5.  Prominent gas and fluid-filled loops of small and large bowel likely reactive ileus.     US LE on 10/5/23  IMPRESSION:   The right CFV and GSV are unable to be assessed due to the existing  arterial line. Exam is otherwise negative for deep vein thrombosis  bilaterally.     US RUE on 10/5/23  IMPRESSION:   1. Negative for right upper extremity DVT.  2. Short segment DVT in the right cephalic vein at the antecubital  fossa, which may be related to prior intravenous access.     US LUE on 10/5/23  IMPRESSION:  1. Negative for DVT in the left upper extremity.  2. Patent AV fistula with good blood flow volume of 1920 mL/min.  Moderate stenosis in the mid  to upper outflow cephalic vein with  diameter reduced to 2.6 mm.     CXR on 10/5/23  IMPRESSION: Cardiac enlargement. Dual-lumen central line tip right atrium. Bibasilar atelectasis or infiltrate and small effusions.

## 2023-10-19 NOTE — PROGRESS NOTES
"CLINICAL NUTRITION SERVICES - REASSESSMENT NOTE    Recommendations Ordered by Registered Dietitian (RD):   Scheduled Ensure Clear mixed berry TID with meals  Discussed the importance of dietary restrictions and encouraged po intake, emphasizing the importance of protein to preserve lean muscle mass   Malnutrition: (10/13)  % Weight Loss:  None noted - fluid shifts may be impacting / masking losses  % Intake:  <75% for > 7 days (moderate malnutrition) (10/19)  Subcutaneous Fat Loss:  Orbital region mild depletion and Upper arm region mild depletion  Muscle Loss:  Temporal region moderate depletion, Clavicle bone region moderate depletion, and Dorsal hand region moderate depletion  Fluid Retention:  None noted     Malnutrition Diagnosis: Moderate malnutrition  In Context of:  Chronic illness or disease     EVALUATION OF PROGRESS TOWARD GOALS   Diet:  Low Sat Fat, Na <2400 mg; Gelatein Plus pineapple between meals and prn, Room Service Assist    Intake/Tolerance:  Blanco said his appetite is fine, he's just frustrated with the restricted diet, but does understand its importance. He did want to note he's disappointed we no longer have the fish on the menu. He denied any n/v/d and said his BM have been \"fine.\" Blanco did want to have Ensure Clear with meals scheduled.     Per flow sheet, pt has a good appetite most days, consuming % of mostly 2-3 meals per day, 10/15: x1 meal and no intake recorded on 10/18.    3 day calorie count average (10/13-15) = 1666 cals (76% needs), 105 gm pro (100% needs)  Calorie count reflects pt meeting at least 2/3 nutrition needs with oral intake    ASSESSED NEEDS:   Dosing Weight 87 kg   Energy: 7746-8592+ kcals (25-30+ Kcal/Kg) - maintenance, on dialysis   Protein: 104-157 grams protein (1.2-1.8 g pro/Kg) - repletion     NEW FINDINGS:   Weight: -2.1 kg (2.5%) loss over past week  10/19/23 0225 85.6 kg (188 lb 12.8 oz) Standing scale   10/18/23 1548 85.4 kg (188 lb 4.8 oz) -- "   10/17/23 0705 85.1 kg (187 lb 9.6 oz) Standing scale   10/16/23 0707 89.2 kg (196 lb 9.6 oz) Standing scale   10/15/23 1639 88.9 kg (195 lb 14.4 oz) Standing scale   10/15/23 0626 88.4 kg (194 lb 12.8 oz) Standing scale   10/14/23 0610 86.6 kg (190 lb 14.4 oz) Standing scale   10/13/23 1420 86.7 kg (191 lb 1.6 oz) Standing scale   10/13/23 0540 87 kg (191 lb 14.4 oz) Standing scale   10/12/23 0528 87.7 kg (193 lb 4.8 oz) Standing scale     Labs: Na+ 131 (L), BUN 49.4 (H), Cr 5.52 (H), GFR 11 (L), Alk Phos 203 (H), -174, Phos 6.6 (H - 10/6)     Meds: Renal MVM    GI: BM x1-2 most days; no BM on 10/17     Skin: Chylous Ascites    Renal: paracentesis today 10/19      Previous Goals:   Pt to consume 75% meals + at least 2 supplements/day   Evaluation: Partially Met; ongoing     Previous Nutrition Diagnosis:   Altered GI function related to chylous ascites as evidenced by need for very low fat diet (<10gm/day)  Evaluation: No change    MALNUTRITION  % Weight Loss:  Up to 1-2% in 1 week (moderate malnutrition); difficult to assess fluid status  % Intake:  <75% for > 7 days (moderate malnutrition)  Subcutaneous Fat Loss:  Orbital region mild depletion and Upper arm region mild depletion  Muscle Loss:  Temporal region moderate depletion, Clavicle bone region moderate depletion, and Dorsal hand region moderate depletion  Fluid Retention: Chylous Ascites    Malnutrition Diagnosis: Moderate malnutrition  In Context of:  Chronic illness or disease    CURRENT NUTRITION DIAGNOSIS  Altered GI function related to chylous ascites as evidenced by need for very low fat diet (<10gm/day)    INTERVENTIONS  Recommendations / Nutrition Prescription  Scheduled Ensure Clear mixed berry with meals  Discussed the importance of dietary restrictions and encouraged po intake, emphasizing the importance of protein to preserve lean muscle mass    Implementation  General/healthful diet and Medical Food Supplement    Goals  Pt to consume 75%  meals + at least 2 supplements/day     MONITORING AND EVALUATION:  Progress towards goals will be monitored and evaluated per protocol and Practice Guidelines    Aki Ag RD

## 2023-10-19 NOTE — PLAN OF CARE
[id number] - [id number]    Orientation: A&Ox4    Activity: SBA    Diet/BS Checks: Low fat, Na<2400    Tele: NSR    IV Access/Drains: R PIV SL, R CVC, L arm fistula    Pain Management: PRN tylenol x1 for 7/10 L arm pain - effective    Abnormal VS/Results: VSS on RA ex soft BP    Bowel/Bladder: Continent, 1 BM overnight    Skin/Wounds: Bruising to L arm and perineum, L elbow skin tear    Consults: ID, Neph, GI, Cardio, SW, PT, OT    D/C Disposition: Pending placement    Other Info: Possible para today.

## 2023-10-19 NOTE — PROGRESS NOTES
Renal Medicine      Patient undergoing paracentesis currently  Plan dialysis 10/20/21  Dialysis orders entered         Recent Labs   Lab 10/19/23  0810   *   POTASSIUM 4.4   CHLORIDE 95*   CO2 22   ANIONGAP 14   *   BUN 49.4*   CR 5.52*   GFRESTIMATED 11*   KELLY 8.8     Recent Labs   Lab 10/19/23  0810 10/18/23  1306   POTASSIUM 4.4 3.7           MIKEL Reilly    [facility]  [phone]

## 2023-10-20 ENCOUNTER — APPOINTMENT (OUTPATIENT)
Dept: PHYSICAL THERAPY | Facility: CLINIC | Age: 58
DRG: 871 | End: 2023-10-20
Payer: COMMERCIAL

## 2023-10-20 LAB
ALBUMIN SERPL BCG-MCNC: 3 G/DL (ref 3.5–5.2)
ALP SERPL-CCNC: 185 U/L (ref 40–129)
ALT SERPL W P-5'-P-CCNC: 7 U/L (ref 0–70)
ANION GAP SERPL CALCULATED.3IONS-SCNC: 9 MMOL/L (ref 7–15)
AST SERPL W P-5'-P-CCNC: 13 U/L (ref 0–45)
BILIRUB SERPL-MCNC: 0.4 MG/DL
BUN SERPL-MCNC: 39.9 MG/DL (ref 8–23)
CALCIUM SERPL-MCNC: 8.4 MG/DL (ref 8.6–10)
CHLORIDE SERPL-SCNC: 94 MMOL/L (ref 98–107)
CREAT SERPL-MCNC: 4.37 MG/DL (ref 0.67–1.17)
DEPRECATED HCO3 PLAS-SCNC: 25 MMOL/L (ref 22–29)
EGFRCR SERPLBLD CKD-EPI 2021: 15 ML/MIN/1.73M2
ERYTHROCYTE [DISTWIDTH] IN BLOOD BY AUTOMATED COUNT: 17.3 % (ref 10–15)
GLUCOSE SERPL-MCNC: 141 MG/DL (ref 70–99)
HCT VFR BLD AUTO: 28.2 % (ref 40–53)
HGB BLD-MCNC: 9.1 G/DL (ref 13.3–17.7)
MCH RBC QN AUTO: 32 PG (ref 26.5–33)
MCHC RBC AUTO-ENTMCNC: 32.3 G/DL (ref 31.5–36.5)
MCV RBC AUTO: 99 FL (ref 78–100)
PLATELET # BLD AUTO: 157 10E3/UL (ref 150–450)
POTASSIUM SERPL-SCNC: 4.3 MMOL/L (ref 3.4–5.3)
PROT SERPL-MCNC: 6.4 G/DL (ref 6.4–8.3)
RBC # BLD AUTO: 2.84 10E6/UL (ref 4.4–5.9)
SODIUM SERPL-SCNC: 128 MMOL/L (ref 135–145)
TACROLIMUS BLD-MCNC: 5.6 UG/L (ref 5–15)
TME LAST DOSE: NORMAL H
TME LAST DOSE: NORMAL H
WBC # BLD AUTO: 4.4 10E3/UL (ref 4–11)

## 2023-10-20 PROCEDURE — 90935 HEMODIALYSIS ONE EVALUATION: CPT

## 2023-10-20 PROCEDURE — 250N000012 HC RX MED GY IP 250 OP 636 PS 637: Performed by: INTERNAL MEDICINE

## 2023-10-20 PROCEDURE — 99232 SBSQ HOSP IP/OBS MODERATE 35: CPT | Performed by: PHYSICIAN ASSISTANT

## 2023-10-20 PROCEDURE — 250N000012 HC RX MED GY IP 250 OP 636 PS 637: Performed by: PHYSICIAN ASSISTANT

## 2023-10-20 PROCEDURE — 80321 ALCOHOLS BIOMARKERS 1OR 2: CPT | Performed by: INTERNAL MEDICINE

## 2023-10-20 PROCEDURE — 80197 ASSAY OF TACROLIMUS: CPT | Performed by: PHYSICIAN ASSISTANT

## 2023-10-20 PROCEDURE — 36415 COLL VENOUS BLD VENIPUNCTURE: CPT | Performed by: HOSPITALIST

## 2023-10-20 PROCEDURE — 99232 SBSQ HOSP IP/OBS MODERATE 35: CPT | Performed by: INTERNAL MEDICINE

## 2023-10-20 PROCEDURE — 250N000013 HC RX MED GY IP 250 OP 250 PS 637: Performed by: HOSPITALIST

## 2023-10-20 PROCEDURE — 250N000011 HC RX IP 250 OP 636: Performed by: INTERNAL MEDICINE

## 2023-10-20 PROCEDURE — 634N000001 HC RX 634: Mod: JZ | Performed by: INTERNAL MEDICINE

## 2023-10-20 PROCEDURE — 250N000013 HC RX MED GY IP 250 OP 250 PS 637: Performed by: STUDENT IN AN ORGANIZED HEALTH CARE EDUCATION/TRAINING PROGRAM

## 2023-10-20 PROCEDURE — 120N000001 HC R&B MED SURG/OB

## 2023-10-20 PROCEDURE — 258N000003 HC RX IP 258 OP 636: Performed by: INTERNAL MEDICINE

## 2023-10-20 PROCEDURE — 36415 COLL VENOUS BLD VENIPUNCTURE: CPT | Performed by: STUDENT IN AN ORGANIZED HEALTH CARE EDUCATION/TRAINING PROGRAM

## 2023-10-20 PROCEDURE — 97116 GAIT TRAINING THERAPY: CPT | Mod: GP

## 2023-10-20 PROCEDURE — 80053 COMPREHEN METABOLIC PANEL: CPT | Performed by: STUDENT IN AN ORGANIZED HEALTH CARE EDUCATION/TRAINING PROGRAM

## 2023-10-20 PROCEDURE — 97530 THERAPEUTIC ACTIVITIES: CPT | Mod: GP

## 2023-10-20 PROCEDURE — 250N000009 HC RX 250: Performed by: PHYSICIAN ASSISTANT

## 2023-10-20 PROCEDURE — 85041 AUTOMATED RBC COUNT: CPT | Performed by: HOSPITALIST

## 2023-10-20 PROCEDURE — 99233 SBSQ HOSP IP/OBS HIGH 50: CPT | Performed by: STUDENT IN AN ORGANIZED HEALTH CARE EDUCATION/TRAINING PROGRAM

## 2023-10-20 PROCEDURE — 250N000009 HC RX 250: Performed by: INTERNAL MEDICINE

## 2023-10-20 PROCEDURE — 90935 HEMODIALYSIS ONE EVALUATION: CPT | Performed by: INTERNAL MEDICINE

## 2023-10-20 RX ORDER — MIDODRINE HYDROCHLORIDE 5 MG/1
10 TABLET ORAL 2 TIMES DAILY PRN
Status: DISCONTINUED | OUTPATIENT
Start: 2023-10-20 | End: 2023-10-23 | Stop reason: HOSPADM

## 2023-10-20 RX ADMIN — TACROLIMUS 0.5 MG: 5 CAPSULE ORAL at 14:23

## 2023-10-20 RX ADMIN — Medication 1 CAPSULE: at 12:02

## 2023-10-20 RX ADMIN — GUAIFENESIN 600 MG: 600 TABLET, EXTENDED RELEASE ORAL at 20:50

## 2023-10-20 RX ADMIN — SODIUM CHLORIDE 250 ML: 9 INJECTION, SOLUTION INTRAVENOUS at 09:08

## 2023-10-20 RX ADMIN — OXYCODONE HYDROCHLORIDE 2.5 MG: 5 TABLET ORAL at 21:05

## 2023-10-20 RX ADMIN — LACOSAMIDE 100 MG: 50 TABLET, FILM COATED ORAL at 19:36

## 2023-10-20 RX ADMIN — MELATONIN TAB 3 MG 3 MG: 3 TAB at 21:05

## 2023-10-20 RX ADMIN — SEVELAMER CARBONATE 800 MG: 800 TABLET, FILM COATED ORAL at 19:36

## 2023-10-20 RX ADMIN — MIDODRINE HYDROCHLORIDE 10 MG: 5 TABLET ORAL at 16:38

## 2023-10-20 RX ADMIN — SEVELAMER CARBONATE 800 MG: 800 TABLET, FILM COATED ORAL at 18:03

## 2023-10-20 RX ADMIN — EPOETIN ALFA-EPBX 4000 UNITS: 4000 INJECTION, SOLUTION INTRAVENOUS; SUBCUTANEOUS at 09:03

## 2023-10-20 RX ADMIN — LACOSAMIDE 50 MG: 50 TABLET, FILM COATED ORAL at 12:02

## 2023-10-20 RX ADMIN — SODIUM CHLORIDE 300 ML: 9 INJECTION, SOLUTION INTRAVENOUS at 09:07

## 2023-10-20 RX ADMIN — SEVELAMER CARBONATE 800 MG: 800 TABLET, FILM COATED ORAL at 07:26

## 2023-10-20 RX ADMIN — APIXABAN 5 MG: 5 TABLET, FILM COATED ORAL at 20:50

## 2023-10-20 RX ADMIN — MIDODRINE HYDROCHLORIDE 10 MG: 5 TABLET ORAL at 08:59

## 2023-10-20 RX ADMIN — OXYCODONE HYDROCHLORIDE 2.5 MG: 5 TABLET ORAL at 07:49

## 2023-10-20 RX ADMIN — TACROLIMUS 0.5 MG: 5 CAPSULE ORAL at 18:07

## 2023-10-20 RX ADMIN — CEFEPIME HYDROCHLORIDE 1 G: 1 INJECTION, POWDER, FOR SOLUTION INTRAMUSCULAR; INTRAVENOUS at 13:14

## 2023-10-20 RX ADMIN — MIDODRINE HYDROCHLORIDE 10 MG: 5 TABLET ORAL at 07:48

## 2023-10-20 RX ADMIN — PANTOPRAZOLE SODIUM 40 MG: 40 TABLET, DELAYED RELEASE ORAL at 06:43

## 2023-10-20 RX ADMIN — GABAPENTIN 300 MG: 300 CAPSULE ORAL at 21:04

## 2023-10-20 RX ADMIN — SEVELAMER CARBONATE 800 MG: 800 TABLET, FILM COATED ORAL at 12:01

## 2023-10-20 RX ADMIN — APIXABAN 5 MG: 5 TABLET, FILM COATED ORAL at 07:49

## 2023-10-20 ASSESSMENT — ACTIVITIES OF DAILY LIVING (ADL)
ADLS_ACUITY_SCORE: 26

## 2023-10-20 NOTE — PROGRESS NOTES
[facility]    Medicine Progress Note - Hospitalist Service    Date of Admission:  10/5/2023  Date of Service: 10/20/2023    Assessment & Plan     Blanco Osborne is a 58 year old male with extensive medical history that includes liver transplant in 2016 due to alcoholic liver disease, now with progressive cirrhosis and ascites, paroxysmal atrial fibrillation, on anticoagulation with Eliquis, diabetes mellitus type 2, previous CVA, hypertension, CHRISTIAN on BiPAP, chronic pericardial effusion, ESRD on hemodialysis who presented on 10/5/2023 with palpitations and chest discomfort.       He was hypotensive and had wide-complex tachycardia felt to be atrial fibrillation with aberrancy.  He failed 2 attempts of emergent cardioversion.  He was started on amiodarone and subsequently converted to sinus rhythm. He is now on po amiodarone     He received IV fluids but remained hypotensive and subsequently received pressors that were discontinued on 10/7. He had severe sepsis likely due to SBP and pneumonia. Blood and ascitic fluid cultures were negative. He was intially on vancomycin and zosyn. Vancomycin was discontinued on 10/8.     Troponins were elevated, felt to be demand ischemia.  Echo showed pericardial effusion without tamponade.  CT C/A/P showed PE and pulmonary infiltrates.  He was started on IV heparin.      Acute hypoxic respiratory failure-Resolved - multifactorial  -Clinically patient is on room air since yesterday night and was a combination of likely pneumonia versus fluid overload    Severe sepsis with septic shock - due to multifactorial pneumonia -resolved  Chronic hypotension, on midodrine  -Is normally on midodrine for chronic hypotension    -Initially presented with septic shock which has now resolved.  Off pressors since 10/7/2023.  Blood pressure currently in normal range  -Continue midodrine for chronic hypotension and of note patient on nondialysis takes it on  as needed  basis and after he was transferred from ICU he has been on scheduled 15 three times daily and in spite of that he does get hypotensive at times  -Note patient has been afebrile since he has been in the hospital, has completed 8 days of antibiotics, WBC count continues to be stable, no SBP on the ascitic fluid but in spite of that he continues to have lower blood pressure and he is on very high dose of midodrine as started by the ICU team  -Random cortisol level checked on 10/13 have been normal  -Status 25 g of 25% albumin on 10/14  -Dose of midodrine was cut down to 10 mg 3 times daily scheduled on 10/14 evening  -Infectious disease team was consulted yesterday and they wanted to see if patient can be transferred to [facility] currently they do not have beds and I did talk to transplant ID team as below  -We will monitor how his blood pressures are during the course of the day and patient only takes midodrine on as-needed basis and does take it for sure on days of dialysis    Infectious disease   -Blood cultures on 10/6/2023 have been negative  -C. difficile has been negative during this hospital stay  -Ascitic tap was done on 10/6 and aerobic, anaerobic have been negative and fungal or yeast have been negative so far and is predominantly lymphocytic and triglycerides are high and negative for malignancy  -Ascites tap on 10/10 the aerobic cultures are negative and is predominantly lymphocytic  -Pericardial tap on 10/11-negative for malignancy, Gram stain is negative, aerobic bacterial culture is negative  -Ascitic tap on 10/12-predominantly lymphocytic and triglycerides, flow cytometry shows polytypic B cells, aerobic cultures have not shown any growth  - He was checked for AFB in the ascitic fluid in January 2023  - Case discussed with transplant hepatology and noted transplant ID's request to transfer pt to [facility]. Working on transfer  - Continue cefepime per ID   - respiratory viral panel is  negative  - Strongyloides antibody ordered and pending rest  - No history of pets, exposure to ticks or exotic travel  - CMV is negative     End-stage liver disease, s/p liver transplant in 2016, now with recurrent ascites and Portal hypertension likely due to cirrhosis(status biopsy on 12/22 which showed poorly preserved liver with evidence of cirrhosis  s/p paracentesis on 10/6 and 10/11  Chylous ascites/ effusion  -On presentation on admission patient was found to have effusion and paracentesis was done and the there were 1119 nucleated cells and triglycerides were elevated at more than 400 and it was chylous effusion and patient has been treated with IV Zosyn for presumed SBP  - his differential count from 10/6 shows predominantly lymphocytes(77 per cent) and ANC is only 11.2 and he does not have SBP and his cultures have been negative including aerobic, anaerobic and fungal  -Status repeat paracentesis on 10/10 and 4.6 L of fluid was removed and triglycerides are elevated at 503, chylous effusion, local cultures again are negative and cell count  showed 761 nucleated cells with 73% lymphocytes, 22% monocytes and ANC of 26.6  -GI team from [place] has been following the patient and they recommended right heart cath his concern was that his ascites started at the time when he had cardiopulmonary symptoms and If his pressures are not elevated then future test may include transjugular liver biopsy and or hepatic vein wedge pressure measurement and that decision will be up to the transplant team of Dr. Simms and they are in touch with him  -right heart cath on 10/11 which shows normal right and left-sided filling pressures, mild elevated pulmonary pressures and portal hypertension with hepatic vein pressure gradient of 10 mmHg  -Most likely cause of portal hypertension is cirrhosis as evident on biopsy done on 12/22  - 10/12-10/12-Tacrolimus levels were high and transplant hepatology has been managing his  tacrolimus    -As per GI team note dated 10/13 on next Repeat the sciatic studies including triglyceridesTG (to evaluate if improved post restriction to low fat diet), cell count with diff, cultures, Alb, T.PRO.  -If TG improved on next paracentesis, would continue very low fat (LCT) diet for 2-3 weeks then liberalize fat restriction for LCT challenge and repeat TG on next paracentesis to evaluate if chylous ascites persists.  -If chylous ascites persists despite compliance to low fat diet, consider IR consult for lymphoscintigraphy (possibly with SPECT CT) to locate site of lymphatic leak for embolization of leak.   -No MCT supplementation in the setting of Cirrhosis (high risk for narcosis)   -Calories count  -Nutrition consulted  -10/14-d/w with Dr. Leventhal from transplant hepatology and he did start the patient back on tacrolimus at 1 mg and 0.5 mg and repeat levels to be done on Monday or Tuesday and I did discuss albumin and he generally prefers 25% given that volume is less  -10/16 -> repeat paracentesis with ascites studies: triglcyerides, AFB culture and stain, fungal cultures, cell count with diff, cultures, albumin and total protein. Give 25g IV albumin given symptomatic hypotension following HD and paracentesis    - Dscussed with transplant hepatology and noted transplant ID's request to transfer pt to [facility]. Working on transfer, currently on waitlist.     Multifocal pneumonia, organism unspecified  - CT scan showed pulmonary infiltrates   - COVID 19/influenza/RSV negative  - blood cultures negative till date from 10/5  - unable to provide sputum specimen  -vancomycin was discontinued on 10/8  -Patient received total of 7 to 8 days of IV Zosyn and then another day Augmentin and it was discontinued with last dose on 10/13    Diarrhea-improved  -C. difficile has been negative on 10/10     Pulmonary embolism in segmental and subsegmental pulmonary arteries of left upper lobe, without cor pulmonale -  Short  segment  thrombophlebitis in right cephalic vein at antecubital fossa, likely related to prior intravenous access  -Eliquis held on admission and was started on IV heparin  -Continue with Eliquis which was restarted on     Recurrent chronic pericardial effusion, s/p pericardiocentesis on 9/29.  Drain was removed on 9/30.   Status repeat pericardiocentesis on 10/11  - Felt to be due to volume overload due to renal failure   -  TTE 10/7 showed moderate pericardial effusion, slightly larger than seen on 10/5, with no evidence of tamponade physiology  - echo on 10/9 showed moderate to large bloody cardial effusion and as compared to 10/7 it has increased, IVC is mildly dilated, mild RV diastolic collapse and has inflow velocity variation consistent with hemodynamic compromise  -Status repeat pericardiocentesis with removal of 560 mL of fluid and it was bloody, red and there were 2558 nucleated cells, no organisms, 2 WBCs, glucose of 79, total protein of 4.9 and albumin level of 2.7 and ADA is pending along with cytology and he had post procedure repeat echo which showed excellent results after removal of fluid  -Drain was removed on 10/12  -Repeat echo as per cardiology -> Repeat echocardiogram tomorrow   -If patient has repeat pericardiocentesis in future then sent for AFB/fungal culture, 16 S and 28 S    Recurrent chest discomfort  -He has been having on and off chest discomfort and initially during the course of hospitalization his troponin did peak at 346 on 10/5 and it was thought to be due to demand because of shock and EKG did not had any ST elevation and patient had rapid response yesterday and repeat troponin yesterday showed troponins have trended down to 307  -Echo as above  - discussed with cardiology and as per them his chest discomfort was likely due to pericardial effusion  -In case in future he has recurrent chest discomfort he will need ischemic work-up  -Patient again and had episode of chest  discomfort on 10/14 and was present on palpation and is similar to prior episodes and troponin was checked and was 320 and a repeat this morning is similar and has trended down and EKG did not show any ST elevation  -Discussed with the cardiology team and we will have them see him again tomorrow     Chronic paroxysmal atrial fibrillation, on anticoagulation with Eliquis  Wide-complex tachycardia on admission felt to be atrial fibrillation with aberrant conduction, failed cardioversion x 2  -Patient was started on amiodarone and converted to normal sinus rhythm and is currently on oral amiodarone  -Family did wanted to discuss about ongoing use of amiodarone as it has a lot of side effects and can interfere with tacrolimus levels and we will consult cardiology team again for tomorrow morning     ESRD, on hemodialysis q. Monday, Wednesday, Friday  S/p left AV fistula pseudoaneurysm repair, 8/2023  His AV fistula on the left upper extremity infiltrated on 10/6-  - continue dialysis per nephrology and continue with renal multivitamin and sevelamer     Small bilateral pleural effusions  Chronically loculated right pleural effusion  - stable       Generalized anxiety disorder  Restless leg syndrome  -On hydroxyzine and ropinirole     Chronic anemia-due to anemia of chronic disease  -Hemoglobin is baseline between 8-9  -Hemoglobin is stable today at 7.9 on 10/15  -We will check labs intermittently     chronic thrombocytopenia  -Platelet count this morning is 145    Seizure disorder  - continue Vimpat     GERD  -Continue PPI     Probable CHRISTIAN  --Per patient's wife, he had a sleep study about 10 years ago but does not have any CPAP or BiPAP at home  -Did use BiPAP last night  -Need to  undergo sleep study as outpatient after discharge     Previous embolic strokes     Generalized weakness  -Continue with physical therapy and Occupational Therapy.     Pain at multiple locations  -Has pain in left upper extremity due to  infiltration of AV fistula, pain in right upper extremity due to thrombophlebitis and frequent lab draws.  Also complains of diffuse pain in shoulders and upper back  -continue Tylenol as needed  -IV Dilaudid 0.2 mg every 6 hours as needed and Percocet every 8 hours as needed     Diet: Snacks/Supplements Adult: Gelatein Plus; Between Meals  Room Service  Combination Diet 2 gm NA Diet; Very Low Fat Diet (up to 10g)  Snacks/Supplements Adult: Ensure Clear; With Meals    DVT Prophylaxis: Heparin drip  Nixon Catheter: Not present  Lines: PRESENT      Hemodialysis Vascular Access Right Subclavian-Site Assessment: WDL  Hemodialysis Vascular Access Arteriovenous fistula Left Forearm-Site Assessment: WDL      Cardiac Monitoring: None  Code Status: Full Code      Clinically Significant Risk Factors              # Hypoalbuminemia: Lowest albumin = 2.8 g/dL at 10/17/2023  6:33 AM, will monitor as appropriate         # Hypertension: Noted on problem list           # Moderate Malnutrition: based on nutrition assessment           Disposition Plan      Expected Discharge Date: 10/21/2023      Destination: home with family;inpatient rehabilitation facility  Discharge Comments: 10-11 angio and pericardiocentesis and HD          Franky Monet MD  Hospitalist Service  [facility]  Securely message with dineout (more info)  Text page via [employer] Paging/Directory   _  _____________________________________________________________________    Interval History     No acute events overnight  He denies any lightheadedness or dizziness.    No CP/SOB  No fevers  Working on transfer to [facility], waiting for bed availability  Otherwise doing well post paracentesis yesterday    Physical Exam     Temp: 98.2  F (36.8  C) Temp src: Oral BP: 96/58 Pulse: 75   Resp: 18 SpO2: 98 % O2 Device: None (Room air)       General: Patient appears comfortable and in no acute distress.  Respiratory: Lungs are clear to auscultation bilaterally with  no wheeze or crackles and has right-sided permacath  Cardiovascular: Regular rate , S1 and S2 normal with no murmer or rubs or gallops  Abdomen:   soft , non tender , mild distended and bowel sounds are present   Skin: No skin rashes   Neurologic:  No facial droop  Musculoskeletal: Normal Range of motion over upper and lower extremities bilaterally   Psychiatric: cooperative          Medical Decision Making       Time spent in care of patient is 50 minutes and I discussed plan of care with the patient, nurse , and discussed plan with the patient's brother and the family     Data     I have personally reviewed the following data over the past 24 hrs:    4.4  \   9.1 (L)   / 157     N/A N/A N/A /  N/A   N/A N/A N/A \       Imaging results reviewed over the past 24 hrs:   No results found for this or any previous visit (from the past 24 hour(s)).

## 2023-10-20 NOTE — PROGRESS NOTES
[facility]    Infectious Disease Progress Note    Date of Service (when I saw the patient): 10/20/2023     Assessment & Plan   Blanco Osborne is a 58 year old who was admitted on 10/5/2023.     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 s/p liver transplant  in 2016 now with progressive cirrhosis  with evidence of portal hypertension   Other PMH include: paroxysmal atrial fibrillation on Eliquis, DM type 2, history of CVA, hypertension, CHRISTIAN on BiPAP, chronic pericardial effusion, ESRD on dialysis '  Presented with chest discomfort and palpitations  Found to have pericardial fluid which was tapped, no positive micro   Peritoneal fluid tapped again no positive micro   Previously asitic fluid has been checked for AFB organism none found   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     Physical Exam   Temp: 97.8  F (36.6  C) Temp src: Oral BP: (!) 87/60 Pulse: 80   Resp: 15 SpO2: 100 % O2 Device: None (Room air)    Vitals:    10/18/23 1548 10/19/23 0225 10/20/23 0646   Weight: 85.4 kg (188 lb 4.8 oz) 85.6 kg (188 lb 12.8 oz) 83 kg (183 lb)     Vital Signs with Ranges  Temp:  [97.7  F (36.5  C)-98.9  F (37.2  C)] 97.8  F (36.6  C)  Pulse:  [69-82] 80  Resp:  [15-26] 15  BP: ()/(50-71) 87/60  SpO2:  [96 %-100 %] 100 %    GENERAL APPEARANCE: frail appearing male   HENT: Mouth without ulcers or lesions  RESP: lungs clear to  auscultation   ABDOMEN: distended  MS: extremities normal- no gross deformities noted  SKIN: no suspicious lesions or rashes    Medications    - MEDICATION INSTRUCTIONS -        - MEDICATION INSTRUCTIONS for Dialysis Patients -   Does not apply See Admin Instructions    apixaban ANTICOAGULANT  5 mg Oral BID    ceFEPIme  1 g Intravenous Q24H    gabapentin  300 mg Oral At Bedtime    guaiFENesin  600 mg Oral BID    lacosamide  100 mg Oral QPM    lacosamide  50 mg Oral QAM    melatonin  3 mg Oral At Bedtime    midodrine  10 mg Oral TID w/meals    multivitamin RENAL  1 capsule Oral Daily    - MEDICATION INSTRUCTIONS -   Does not apply Once    pantoprazole  40 mg Oral QAM AC    sevelamer carbonate  800 mg Oral 4x Daily    sodium chloride 0.9%  250 mL Intravenous Once in dialysis/CRRT    sodium chloride 0.9%  300 mL Hemodialysis Machine Once    [START ON 10/21/2023] tacrolimus  0.5 mg Oral QAM    tacrolimus  0.5 mg Oral QPM       Data   All microbiology laboratory data reviewed.  Recent Labs   Lab Test 10/20/23  1050 10/18/23  1306 10/17/23  0633   WBC 4.4 5.4 7.6   HGB 9.1* 8.8* 7.9*   HCT 28.2* 27.4* 25.0*   MCV 99 99 100    151 133*     Recent Labs   Lab Test 10/19/23  0810 10/18/23  1306 10/17/23  0633   CR 5.52* 3.81* 5.68*     No lab results found.  Recent Labs   Lab Test 09/28/16  1600 09/23/16  1458 09/20/16  1901   CULT No VRE isolated No growth Culture negative for acid fast bacilli  Assayed at [facility] [address]    Culture negative after 4 weeks  No anaerobes isolated  No growth       All cultures:  Recent Labs   Lab 10/16/23  1511   CULTURE No growth after 3 days  No growth after 3 days      Blood culture:  Results for orders placed or performed during the hospital encounter of 10/05/23   Blood Culture Peripheral Blood    Specimen: Peripheral Blood   Result Value Ref Range    Culture No Growth    Blood Culture Peripheral Blood    Specimen: Peripheral Blood   Result Value  Ref Range    Culture No Growth    Results for orders placed or performed during the hospital encounter of 01/21/23   Blood Culture Arm, Right    Specimen: Arm, Right; Blood   Result Value Ref Range    Culture No Growth    Blood Culture Peripheral Blood    Specimen: Peripheral Blood   Result Value Ref Range    Culture No Growth    Results for orders placed or performed during the hospital encounter of 12/29/22   Blood Culture Hand, Right    Specimen: Hand, Right; Blood   Result Value Ref Range    Culture No Growth    Blood Culture Hand, Left    Specimen: Hand, Left; Blood   Result Value Ref Range    Culture No Growth    Blood Culture Peripheral Blood    Specimen: Peripheral Blood   Result Value Ref Range    Culture No Growth    Blood Culture Arm, Left    Specimen: Arm, Left; Blood   Result Value Ref Range    Culture No Growth    Results for orders placed or performed during the hospital encounter of 12/16/22   Blood Culture Peripheral Blood    Specimen: Peripheral Blood   Result Value Ref Range    Culture No Growth    Blood Culture Arm, Right    Specimen: Arm, Right; Blood   Result Value Ref Range    Culture No Growth    Results for orders placed or performed during the hospital encounter of 12/16/22   Blood Culture Peripheral Blood    Specimen: Peripheral Blood   Result Value Ref Range    Culture No Growth    Results for orders placed or performed during the hospital encounter of 11/12/22   Blood Culture Line, venous    Specimen: Line, venous; Blood   Result Value Ref Range    Culture No Growth    Blood Culture Hand, Right    Specimen: Hand, Right; Blood   Result Value Ref Range    Culture No Growth    Blood Culture Hand, Right    Specimen: Hand, Right; Blood   Result Value Ref Range    Culture No Growth    Blood Culture Hand, Left    Specimen: Hand, Left; Blood   Result Value Ref Range    Culture No Growth    Blood Culture Peripheral Blood    Specimen: Peripheral Blood   Result Value Ref Range    Culture No Growth     Blood Culture Peripheral Blood    Specimen: Peripheral Blood   Result Value Ref Range    Culture Positive on the 1st day of incubation (A)     Culture Streptococcus pneumoniae (AA)        Susceptibility    Streptococcus pneumoniae - KARAN     Levofloxacin 1.5 Susceptible ug/mL     Meropenem 0.012 Susceptible ug/mL     Vancomycin 0.50 Susceptible ug/mL     Penicillin (meningitis) 0.016 Susceptible ug/mL     Penicillin (non-meningitis) 0.016 Susceptible ug/mL     Penicillin(oral) 0.016 Susceptible ug/mL     Ceftriaxone (non-meningitis) 0.016 Susceptible ug/mL     Ceftriaxone (meningitis) 0.016 Susceptible ug/mL     *Note: Due to a large number of results and/or encounters for the requested time period, some results have not been displayed. A complete set of results can be found in Results Review.      Urine culture:  No results found. However, due to the size of the patient record, not all encounters were searched. Please check Results Review for a complete set of results.

## 2023-10-20 NOTE — PROGRESS NOTES
Renal Medicine Inpatient Dialysis Note                                Blanco Osborne MRN# [id number]   Age: 58 year old YOB: 1965   Date of Admission: 10/5/2023 Hospital LOS: 15          Assessment/Plan:     1.  ESRD on HD      MWF. Deirdre DOBSON . Dr. Augustine weems. EDW 85.5kg. 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 Still has [facility] TD. Called his unit, they prefer us to use the catheter for his run here tomorrow. No heparin.      2.  CKD MBD   Continue PTA renvela.      3.  Anemia of CKD     4.  Pericardial effusion, ascites.      3. Recent Sepsis syndrome.  Was in ICU on pressors. ON zosyn for possible SBP. Improved hemodynamics on 15mg mg TID midodrine. addition dose 10mg mid run during HD.      3.  PE.      4.  Hx liver transplant. New ascites, portal HTN      Continue MWF schedule   Next 10/20/30      Interval History:     Dialysis run parameters reviewed with dialysis RN at patient bedside.  Seen on run  Comfortable  SBP 90 to 100 range    3.5 hours  2K  UF 2.0 liter    Pending transfer to [facility]      ROS     GENERAL: NAD, No fever,chills  R: NEGATIVE for significant cough or SOB  CV: NEGATIVE for chest pain, palpitations  EXT: no change edema  ROS otherwise negative    Dialysis Parameters:     Vitals were reviewed  Patient Vitals for the past 8 hrs:   BP Temp Temp src Pulse Resp SpO2 Weight   10/20/23 0900 (!) 89/63 -- -- 79 20 -- --   10/20/23 0855 -- -- -- 82 19 -- --   10/20/23 0845 (!) 87/63 -- -- 74 15 -- --   10/20/23 0830 (!) 83/64 -- -- 78 -- -- --   10/20/23 0815 96/67 97.8  F (36.6  C) Oral 71 20 100 % --   10/20/23 0743 102/64 97.7  F (36.5  C) Oral 73 18 98 % --   10/20/23 0646 -- -- -- -- -- -- 83 kg (183 lb)     I/O last 3 completed shifts:  In: 560 [P.O.:560]  Out: -     Vitals:    10/16/23 0707 10/17/23 0705 10/18/23 1548 10/19/23 0225   Weight: 89.2 kg (196 lb 9.6 oz) 85.1 kg (187 lb  9.6 oz) 85.4 kg (188 lb 4.8 oz) 85.6 kg (188 lb 12.8 oz)    10/20/23 0646   Weight: 83 kg (183 lb)       Current Weight: 83.2  Dry Weight: varies based on relationship to paracentesis   Dialysis Temp: 36.5  C  Access Device: IJ  Access Site: right  Dialyzer: Revaclear  Dialysis Bath: 2  Sodium Profile: n  UF Goal: 2.5  Blood Flow Rate (mL/min): 400  Total Treatment Time (hrs): 3.5  Heparin: Low dose as required      EPO dose: n  Zemplar: n  IV Fe: n      Medications and Allergies:     Reviewed      Physical Exam:     Seen and examined during course of dialysis run    BP (!) 89/63   Pulse 79   Temp 97.8  F (36.6  C) (Oral)   Resp 20   Ht 1.829 m (6')   Wt 83 kg (183 lb)   SpO2 100%   BMI 24.82 kg/m      GENERAL: awake, alert, follows  HEENT: NC/AT, PERRLA, EOMI, non icteric, pharynx moist without lesion  NECK: supple, no masses or adenopathy  RESP: clear  CV: RRR, normal S1 S2  ABDOMEN: S/NT, BS present  MS: no edema  SKIN: catheter site clean without drainage  NEURO: speech normal and cranial nerves 2-12 intact  PSYCH: affect normal/bright    Data:       Recent Labs   Lab 10/19/23  0810   *   POTASSIUM 4.4   CHLORIDE 95*   CO2 22   ANIONGAP 14   *   BUN 49.4*   CR 5.52*   GFRESTIMATED 11*   KELLY 8.8     Recent Labs   Lab 10/19/23  0810 10/18/23  1306   POTASSIUM 4.4 3.7     Recent Labs   Lab 10/19/23  0810 10/18/23  1306 10/17/23  0633 10/16/23  2228   ALBUMIN 3.0* 3.1* 2.8* 3.2*     Recent Labs   Lab 10/18/23  1306 10/17/23  0633 10/14/23  1042 10/13/23  1155   HGB 8.8* 7.9* 7.9* 8.3*         G Jose Reilly MD    [facility] - Nephrology  [phone]

## 2023-10-20 NOTE — PROGRESS NOTES
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 2016 now with progressive cirrhosis (liver bx 12/2022) with evidence of portal hypertension and ascites, paroxysmal atrial fibrillation on Eliquis, DM type 2, history of CVA, hypertension, CHRISTIAN on BiPAP, chronic pericardial effusion, ESRD on dialysis who presented to [facility] ED on 10/5/23 with chest discomfort and palpitations, admitted with severe sepsis with septic shock requiring pressors secondary to multifactorial pneumonia, atrial fibrillation with aberrancy s/p 2 failed attempts at cardioversion, worsening pericardial effusion on ECHO s/p pericardiocentesis 10/11, and PE started on IV heparin. GI consulted for \"liver transplant, developing portal hypertension, initial tap concerning for sbp\".       # Cirrhosis secondary to NAFLD s/p liver transplant 2016 now with progressive cirrhosis with evidence of portal hypertension and ascites   # SBP   # Chylous ascites   # Abdominal pain   - Patient follows with Dr. Simms, though notably has not been seen in years; last clinic visit 4/21/2020 with plan for 6 month follow up. Next appt 12/2023   - Patient presented with palpitations and chest discomfort but also noted worsening abdominal distention and pain that began around the same time. He has required paracentesis in the past but stated this was back in 2016.    - PTA on tacrolimus 1mg q 12 hours. Tacro level supratherapeutic at 8 on 10/11 (desired levels 3-5), held initially then resumed at 1mg in the morning and 0.5mg in the evening. Tacro level 4.9 on 10/19. Tacro level pending today. Will plan to reduce dose to 0.5 in morning and 0.5 in the evening with recheck labs on Tuesday.   - Admit labs with normal LFTs other than mildly elevated alk phos which is elevated 203, " labs are pending for today. INR 2.00 (1.55 on admission). WBC has been normal.   - Evidence of liver fibrosis in 2017 which could have resolved with complete abstinence from alcohol however in a note from 12/2022, patient's family reported alcohol intake. Liver bx 12/2022 with evidence of cirrhosis, but notably the sample was autolyzed with delayed fixation.     - Paracentesis   - 10/6: 3L removed with 1119 nucleated cells, triglycerides >400 consistent with chylous ascites. SAAG 1.7 consistent with portal hypertension. Cytology negative for malignancy. Cultures are negative including aerobic culture, anaerobic culture and fungal.   - 10/10: 4.65L removed with 761 total nucleated cells, SAAG 3.4. Cultures negative. Triglycerides 503  - 10/12: 3.4L removed with 719 total nucleated cells, SAAG 2.8. Cultures negative. Cytology negative for malignancy. Flow cytometry with polytypic B cells, triglycerides 303, lipase 37.   - 10/16: 2L max removed with 3,844 total nucleated cells, SAAG 2.1, ANC 3,190, prelim aerobic cultures NGTD, prelim fungal culture NGTD, triglycerides 332. Prelim AFB culture and stain negative  - 10/19: 4L removed. Unfortunately my diagnostic paracentesis order was cancelled and diagnostics were not run on the fluid and are unable to be added on.   - Antibiotics: Zosyn (10/5-10/12) for pneumonia and possible SBP (no evidence of SBP on peritoneal fluid analysis 10/6), Augmentin (10/12-10/13) for pneumonia, Ceftriaxone for SBP (10/16 x1) switched to cefepime per ID (10/17- present)  - Quantiferon gold TB negative, HIV negative, EBV detected <500, CMV negative, respiratory panel negative, strongyloides ab IgG negative  - s/p RHC 10/11 which showed normal right and left sided filling pressures, mild elevated pulmonary pressures and portal hypertension with hepatic vein pressure gradient of 10mmHg      - Ascites likely related to portal HTN in the setting of cirrhosis, further c/b disruption of lymphatics  "(d/t increased lymph flow and portal HTN seen with cirrhosis) leading to chylous ascites as reflective of persistent TG >200 in ascites fluid.  Currently on 2gm NA and very low fat diet. He continues to have ongoing TG elevations in ascites and requiring paracentesis every 2-4 days for relief of abd distention. Suspect ascites re-accumulation exacerbated by chyle formation. High risk for developing malnutrition (PRO losses) and further immunocompromise with chyle losses. RD following. Would avoid MCT supplementation in the setting of cirrhosis (increased risk for narcosis/altering mentation).      # Moderate protein-energy malnutrition  - RD following for calorie counts, education on low fat diet, and assistance with increasing PRO/kcals. 3 day calorie count average with \"1666 cals (76% needs), 105 gm pro (100% needs) Calorie count reflects pt meeting at least 2/3 nutrition needs with oral intake\" per RD note 10/16     # Diarrhea, resolved  - Loose stools began 10/9, per nursing charting patient having 1-2 stools per day   - No hx of Cdiff, C diff negative 10/10  - Treated with Zosyn 10/5-10/12, Augmentin 10/12-10/13 and given one dose of vancomycin 10/5  - Could be secondary to bowel edema/poor absorption from portal hypertension, but likely antibiotics as diarrhea has resolved when antibiotics were stopped      # Recurrent pericardial effusion  # Paroxysmal atrial fibrillation   # NSTEMI  - PTA on Eliquis, now continued   - Cardiology followed, now signed off   - ECHO 10/10 with recurrent pericardial effusion slightly larger than seen on ECHO 10/8 s/p pericardiocentesis 10/11 with 560cc removed   - s/p RHC 10/11 which showed normal right and left sided filling pressures, mild elevated pulmonary pressures and portal hypertension with hepatic vein pressure gradient of 10mmHg   - Repeat ECHO 10/17 with no recurrent pericardial effusion, LVEF >70%     # Pulmonary embolism  - Noted on CT scan   - Treated with heparin " gtt      # ESRD on HD  - Nephrology following, HD on MWF  - On kidney transplant list      RECOMMENDATIONS:  - Awaiting transfer to [facility]  - Continue antibiotics per ID   - Continue very low fat diet, do not expect TG to change quickly so will not repeat TG in ascites fluid at this time   - Likely plan to continue very low fat (LCT) diet for 2-3 weeks then liberalize fat restriction for LCT challenge and repeat TG on next paracentesis to evaluate if chylous ascites persists.  -If chylous ascites persists despite compliance to low fat diet, consider IR consult for lymphoscintigraphy (possibly with SPECT CT) to locate site of lymphatic leak for embolization of leak.   - Follow tacrolimus trough levels; Discussed with Dr. Miller, hepatology staff physician.  -Will decrease tacrolimus dose to 0.5mg in the morning and 0.5mg in the evening   - Recheck tacro trough level on Tuesday   - If hypotensive and sx orthostasis post paracentesis or HD runs, would rec give IV Alb, 12.5-25 g (planning to give 25g with paracentesis today)  - Appreciate RD following, continue low fat diet   - Continue PPI     GI will continue to follow     Thank you for involving us in this patient's care. Please do not hesitate to contact the GI service with any questions or concerns.     Overall time spent on the date of this encounter preparing to see the patient (including chart review of available notes, clinical status events, imaging and labs); obtaining and/or reviewing separately obtained history; ordering medications, tests or procedures; communicating with other health care professionals; and documenting the above clinical information in the electronic medical record was 45 minutes.    Martha Medrano PA-C  GI Service  [facility]  Text Page (Monday-Friday, 8am-4pm)    To page the On-Call [facility] GI provider 24 hours a day, please click [facility] and select GASTROENTEROLOGY MEDICINE ADULT / [facility] in the drop-down  menu.   _______________________________________________________________      Subjective: Nursing notes and 24hr events reviewed. Patient seen on dialysis run. He reports that his abdominal pain is significantly better after paracentesis yesterday. He denies new nausea, vomiting. He states he was able to walk more yesterday due to less distention in his abdomen. Answered numerous questions about diet and clinical course. No fevers.     ROS:   4 pt ROS negative unless noted in subjective.     Medications:  Current Facility-Administered Medications   Medication    - MEDICATION INSTRUCTIONS for Dialysis Patients -    acetaminophen (TYLENOL) tablet 650 mg    Or    acetaminophen (TYLENOL) Suppository 650 mg    apixaban ANTICOAGULANT (ELIQUIS) tablet 5 mg    benzocaine-menthol (CHLORASEPTIC) 6-10 MG lozenge 1 lozenge    calcium carbonate (TUMS) chewable tablet 500 mg    camphor-menthol (DERMASARRA) lotion    ceFEPIme (MAXIPIME) 1 g vial to attach to  mL bag for ADULTS or NS 50 mL bag for PEDS    glucose gel 15-30 g    Or    dextrose 50 % injection 25-50 mL    Or    glucagon injection 1 mg    epoetin mirta-epbx (RETACRIT) injection 4,000 Units    gabapentin (NEURONTIN) capsule 300 mg    guaiFENesin (MUCINEX) 12 hr tablet 600 mg    HYDROmorphone (DILAUDID) injection 0.2 mg    hydrOXYzine (ATARAX) tablet 25 mg    lacosamide (VIMPAT) tablet 100 mg    lacosamide (VIMPAT) tablet 50 mg    melatonin tablet 3 mg    midodrine (PROAMATINE) tablet 10 mg    midodrine (PROAMATINE) tablet 10 mg    multivitamin RENAL (TRIPHROCAPS) capsule 1 capsule    naloxone (NARCAN) injection 0.2 mg    Or    naloxone (NARCAN) injection 0.4 mg    Or    naloxone (NARCAN) injection 0.2 mg    Or    naloxone (NARCAN) injection 0.4 mg    No heparin via hemodialysis machine    ondansetron (ZOFRAN ODT) ODT tab 4 mg    Or    ondansetron (ZOFRAN) injection 4 mg    oxyCODONE IR (ROXICODONE) half-tab 2.5 mg    pantoprazole (PROTONIX) EC tablet 40 mg     Patient is already receiving anticoagulation with heparin, enoxaparin (LOVENOX), warfarin (COUMADIN)  or other anticoagulant medication    QUEtiapine (SEROquel) half-tab 50 mg    rOPINIRole (REQUIP) tablet 0.5 mg    sevelamer carbonate (RENVELA) tablet 800 mg    sodium chloride 0.9% BOLUS 100-150 mL    sodium chloride 0.9% BOLUS 250 mL    sodium chloride 0.9% BOLUS 250 mL    sodium chloride 0.9% BOLUS 300 mL    sodium chloride 0.9% BOLUS 300 mL    tacrolimus (GENERIC) suspension 0.5 mg    tacrolimus (GENERIC) suspension 1 mg       Objective:  Blood pressure 102/64, pulse 73, temperature 97.7  F (36.5  C), temperature source Oral, resp. rate 18, height 1.829 m (6'), weight 83 kg (183 lb), SpO2 98%.  Gen: Lying in dialysis bed. Appears comfortable  HEENT: NCAT. Conjunctiva clear. Sclera anicteric   CV: Regular rate  Resp: Non-labored breathing  Abd: Soft, non tender, mildly distended, no guarding or rebound   Skin:  No jaundice  Ext: warm, well perfused   Neuro: grossly normal  Mental status/Psych: A&O. Asks/answers questions appropriately     PROCEDURES:  10/19: Paracentesis with 4L of light brown colored fluid drained. Technically successful paracentesis without immediate complications. Given 25g albumin    10/16: Paracentesis with 2L (max) of straw colored fluid drained. Technically successful paracentesis without immediate complications. Given 25g albumin     10/12: Paracentesis with 3.4 L of cloudy/light brown fluid drained.  There were no immediate complications.     10/10: Paracentesis with 4.65 L of  straw colored fluid was drained. There were no immediate complications.     10/6: Paracentesis with 3L  cloudy, turbid milky fluid aspirated into vacuum bottles.    LABS:  [facility]  Recent Labs   Lab 10/19/23  0810 10/18/23  1306 10/17/23  0633 10/16/23  2228   * 133* 132* 131*   POTASSIUM 4.4 3.7 4.7 4.4   CHLORIDE 95* 94* 94* 92*   KELLY 8.8 8.8 8.7 8.6   CO2 22 26 22 23   BUN 49.4* 28.3* 47.8* 41.1*   CR 5.52*  3.81* 5.68* 5.09*   * 174* 99 123*     CBC  Recent Labs   Lab 10/18/23  1306 10/17/23  0633 10/14/23  1042 10/13/23  1155   WBC 5.4 7.6 8.4 6.5   RBC 2.76* 2.51* 2.51* 2.65*   HGB 8.8* 7.9* 7.9* 8.3*   HCT 27.4* 25.0* 25.2* 25.9*   MCV 99 100 100 98   MCH 31.9 31.5 31.5 31.3   MCHC 32.1 31.6 31.3* 32.0   RDW 18.2* 18.0* 16.6* 15.8*    133* 145* 143*     INR  Recent Labs   Lab 10/17/23  0633   INR 2.00*     LFTs  Recent Labs   Lab 10/19/23  0810 10/18/23  1306 10/17/23  0633 10/16/23  2228   ALKPHOS 203* 187* 142* 158*   AST 15 11 14 13   ALT 5 7 7 6   BILITOTAL 0.4 0.5 0.5 0.5   PROTTOTAL 6.4 6.6 5.9* 6.3*   ALBUMIN 3.0* 3.1* 2.8* 3.2*      PANC  Recent Labs   Lab 10/13/23  0850   LIPASE 24     CULTURES:   7-Day Micro Results       Collected Updated Procedure Result Status      10/17/2023 2010 10/17/2023 2056 Asymptomatic COVID-19 Virus (Coronavirus) by PCR Nasopharyngeal [[id number]]    Swab from Nasopharyngeal    Final result Component Value   SARS CoV2 PCR Negative   NEGATIVE: SARS-CoV-2 (COVID-19) RNA not detected, presumed negative.            10/16/2023 1511 10/16/2023 1941 Cell count with differential fluid [[id number]]    (Abnormal)   Ascites Fluid from Paracentesis    Final result Component Value   No component results            10/16/2023 1511 10/16/2023 1604 Albumin fluid [[id number]]    Ascites Fluid from Paracentesis    Final result Component Value Units   Albumin Fluid Source Abdomen    ascites  ascites   Albumin fluid 1.1 g/dL            10/16/2023 1511 10/16/2023 1604 Protein fluid [[id number]]    Ascites Fluid from Paracentesis    Final result Component Value Units   Protein Fluid Source Abdomen    ascites  ascites  ascites   Protein Total Fluid 2.6 g/dL            10/16/2023 1511 10/20/2023 0723 Ascites Fluid Aerobic Bacterial Culture Routine [[id number]]   Ascites Fluid from Abdomen    Preliminary result Component Value   Culture No growth after 3 days  [P]                 10/16/2023 1511 10/16/2023 1520 Acid-Fast Bacilli Culture and Stain [[id number]]    Ascites Fluid from Abdomen    In process Component Value   No component results            10/16/2023 1511 10/19/2023 1831 Fungal or Yeast Culture Routine [[id number]]   Ascites Fluid from Abdomen    Preliminary result Component Value   Culture No growth after 3 days  [P]                10/16/2023 1511 10/16/2023 1938 Cell Count Body Fluid [[id number]]    (Abnormal)   Ascites Fluid from Paracentesis    Final result Component Value Units   Color Orange    Clarity Turbid    Cell Count Fluid Source Abdomen    ascites  ascites   Total Nucleated Cells 3,844 /uL            10/16/2023 1511 10/18/2023 1331 Acid-Fast Bacilli Culture and Stain [[id number]]    Ascites Fluid from Abdomen    Preliminary result Component Value   Acid Fast Stain No acid fast bacilli seen  [P]    Performed By: [facility] [place] [id number] Director: Elvis Mattson MD, PhDCLIA Number: [id number]   Acid Fast Stain No acid fast bacilli seen  [P]    Performed By: [facility] [place], [place] [id number] Director: Elvis Mattson MD, PhDCLIA Number: [id number]  This is an appended report. These results have been appended to a previously preliminary verified report.            10/16/2023 1511 10/16/2023 1941 Differential Body Fluid [[id number]]    (Abnormal)   Ascites Fluid from Paracentesis    Final result Component Value Units   % Neutrophils 83 %   % Lymphocytes 7 %   % Monocyte/Macrophages 10 %   Absolute Neutrophils, Body Fluid 3,190.5 /uL            10/14/2023 2101 10/15/2023 1100 Cytomegalovirus DNA by PCR, Quantitative [[id number]]    Blood from Arm, Right    Final result Component Value Units   CMV DNA IU/mL Not Detected IU/mL            10/14/2023 1808 10/14/2023 2211 Respiratory Panel PCR [[id number]]    Swab from Nasopharyngeal    Final result Component Value   Adenovirus Not  Detected   Coronavirus Not Detected   This test detects Coronavirus 229E, HKU1, NL63 and OC43 but does not distinguish between them. It does not detect MERS ( Respiratory Syndrome), SARS (Severe Acute Respiratory Syndrome) or 2019-nCoV (Novel 2019) Coronavirus.   Human Metapneumovirus Not Detected   Human Rhin/Enterovirus Not Detected   Influenza A Not Detected   Influenza A, H1 Not Detected   Influenza A 2009 H1N1 Not Detected   Influenza A, H3 Not Detected   Influenza B Not Detected   Parainfluenza Virus 1 Not Detected   Parainfluenza Virus 2 Not Detected   Parainfluenza Virus 3 Not Detected   Parainfluenza Virus 4 Not Detected   Respiratory Syncytial Virus A Not Detected   Respiratory Syncytial Virus B Not Detected   Chlamydia Pneumoniae Not Detected   Mycoplasma Pneumoniae Not Detected            10/13/2023 1155 10/16/2023 1307 Quantiferon TB Gold Plus Grey Tube [[id number]]   Peripheral Blood    Final result Component Value Units   Quantiferon Nil Tube 0.00 IU/mL            10/13/2023 1155 10/16/2023 1307 Quantiferon TB Gold Plus Green Tube [[id number]]   Peripheral Blood    Final result Component Value Units   Quantiferon TB1 Tube 0.00 IU/mL            10/13/2023 1155 10/16/2023 1308 Quantiferon TB Gold Plus Yellow Tube [[id number]]   Peripheral Blood    Final result Component Value   Quantiferon TB2 Tube 0.00            10/13/2023 1155 10/16/2023 1308 Quantiferon TB Gold Plus Purple Tube [[id number]]   Peripheral Blood    Final result Component Value Units   Quantiferon Mitogen 0.60 IU/mL            10/13/2023 1155 10/16/2023 1317 Quantiferon TB Gold Plus [[id number]]   Peripheral Blood    Final result Component Value Units   Quantiferon-TB Gold Plus Negative    No interferon gamma response to M.tuberculosis antigens was detected. Infection with M.tuberculosis is unlikely, however a single negative result does not exclude infection. In patients at high risk for infection, a second test  should be considered in accordance with the 2017 ATS/IDSA/CDC Clinical Pract  ice Guidelines for Diagnosis of Tuberculosis in Adults and Children    TB1 Ag minus Nil Value 0.00 IU/mL   TB2 Ag minus Nil Value 0.00 IU/mL   Mitogen minus Nil Result 0.60 IU/mL   Nil Result 0.00 IU/mL                    IMAGING:  CT Chest PE, A/P on 10/5/23  IMPRESSION:  1.  Large pericardial effusion. This measures 25 mm in greatest depth which is suggestive of at least 500 mL of fluid.  2.  Pulmonary arteries are increased in size with the main pulmonary artery measuring 39 mm. This is consistent with pulmonary arterial hypertension. Segmental and subsegmental pulmonary artery emboli are seen to the left upper lobe. No definite right   heart strain.  3.  Small bilateral pleural effusions with the right effusion appearing chronically loculated with pleural thickening. Bilateral lung consolidations right greater than left suspicious for multifocal pneumonia.  4.  Prior liver transplant. Multiple upper abdominal collaterals and splenomegaly suggestive of portal hypertension, with small to moderate abdominopelvic ascites.  5.  Prominent gas and fluid-filled loops of small and large bowel likely reactive ileus.     US LE on 10/5/23  IMPRESSION:   The right CFV and GSV are unable to be assessed due to the existing  arterial line. Exam is otherwise negative for deep vein thrombosis  bilaterally.     US RUE on 10/5/23  IMPRESSION:   1. Negative for right upper extremity DVT.  2. Short segment DVT in the right cephalic vein at the antecubital  fossa, which may be related to prior intravenous access.     US LUE on 10/5/23  IMPRESSION:  1. Negative for DVT in the left upper extremity.  2. Patent AV fistula with good blood flow volume of 1920 mL/min.  Moderate stenosis in the mid to upper outflow cephalic vein with  diameter reduced to 2.6 mm.     CXR on 10/5/23  IMPRESSION: Cardiac enlargement. Dual-lumen central line tip right atrium. Bibasilar  atelectasis or infiltrate and small effusions.

## 2023-10-20 NOTE — PLAN OF CARE
Physical Therapy Discharge Summary    Reason for therapy discharge:    All goals and outcomes met, no further needs identified.    Progress towards therapy goal(s). See goals on Care Plan in [facility] electronic health record for goal details.  Goals met    Therapy recommendation(s):    No further therapy is recommended. Continue HEP, reassess PT needs after transfer of care and change in status.

## 2023-10-20 NOTE — PROGRESS NOTES
Potassium   Date Value Ref Range Status   10/19/2023 4.4 3.4 - 5.3 mmol/L Final   12/29/2022 3.4 3.4 - 5.3 mmol/L Final   04/20/2020 3.9 3.4 - 5.3 mmol/L Final     Potassium POCT   Date Value Ref Range Status   10/05/2023 3.6 3.4 - 5.3 mmol/L Final     Hemoglobin   Date Value Ref Range Status   10/18/2023 8.8 (L) 13.3 - 17.7 g/dL Final   04/20/2020 14.3 13.3 - 17.7 g/dL Final     Creatinine   Date Value Ref Range Status   10/19/2023 5.52 (H) 0.67 - 1.17 mg/dL Final   04/20/2020 1.06 0.66 - 1.25 mg/dL Final     Urea Nitrogen   Date Value Ref Range Status   10/19/2023 49.4 (H) 8.0 - 23.0 mg/dL Final   12/29/2022 46 (H) 7 - 30 mg/dL Final   04/20/2020 23 7 - 30 mg/dL Final     Sodium   Date Value Ref Range Status   10/19/2023 131 (L) 135 - 145 mmol/L Final     Comment:     Reference intervals for this test were updated on 09/26/2023 to more accurately reflect our healthy population. There may be differences in the flagging of prior results with similar values performed with this method. Interpretation of those prior results can be made in the context of the updated reference intervals.    04/20/2020 139 133 - 144 mmol/L Final     INR   Date Value Ref Range Status   10/17/2023 2.00 (H) 0.85 - 1.15 Final   10/07/2019 1.20 (H) 0.86 - 1.14 Final       DIALYSIS PROCEDURE NOTE  Hepatitis status of previous patient on machine log was checked and verified ok to use with this patients hepatitis status.  Patient dialyzed for 3 hrs. on a K3 bath with a net fluid removal of  1.9L.  A BFR of 400 ml/min was obtained via a CVC access.    The treatment plan was discussed with Dr. Reilly during the treatment.    Total heparin received during the treatment: 0 units.   Line flushed, clamped and capped with NS locks.    Meds  given: Epogen 4,000 Units and Midodrine 10 mg.   Complications: Hypotension, resolved.     Person educated: Patient. Knowledge base substantial. Barriers to learning: none. Educated on procedural via verbal mode.   Patient verbalized understanding.     ICEBOAT? Timeout performed pre-treatment  I: Patient was identified using 2 identifiers  C:  Consent Signed Yes  E: Equipment preventative maintenance is current and dialysis delivery system OK to use  B:    Latest Reference Range & Units 09/27/23 17:50   Hep B Surface Agn Nonreactive  Nonreactive   Hepatitis B Surface Antibody Instrument Value <8.00 m[IU]/mL 0.12   Hepatitis B Surface Antibody  Nonreactive     O: Dialysis orders present and complete prior to treatment  A: Vascular access verified and assessed prior to treatment  T: Treatment was performed at a clinically appropriate time  ?: Patient was allowed to ask questions and address concerns prior to treatment  See Adult Hemodialysis flowsheet in [facility] for further details and post assessment.  Machine water alarm in place and functioning. Transducer pods intact and checked every 15min.   Pt returned via bed.  Chlorine/Chloramine water system checked every 4 hours.  Outpatient Dialysis at [facility] on MW.    Patient repositioned every 2 hours during the treatment.  Post treatment report given to Julissa Bowen RN regarding 1.9L of fluid removed, last BP of 94/62, and patient pain rating of 0/10.

## 2023-10-20 NOTE — PLAN OF CARE
4511 - 2214    Orientation: A&Ox4    Activity: Independent    Diet/BS Checks: Low fat, Na<2400    Tele: N/A    IV Access/Drains: R PIV SL, R CVC, L arm fistula    Pain Management: PRN oxy x1 for L arm pain - effective    Abnormal VS/Results: VSS on RA ex soft BP    Bowel/Bladder: Continent, no BM overnight    Skin/Wounds: Bruising to L arm and perineum, L elbow skin tear    Consults: ID, Neph, GI, Cardio, SW, PT, OT    D/C Disposition: Pending placement    Other Info: Walked in hallway x3

## 2023-10-20 NOTE — PLAN OF CARE
DATE & TIME: 10-20-23 AM shift    Cognitive Concerns/ Orientation : A/Ox4.    BEHAVIOR & AGGRESSION TOOL COLOR: Green  CIWA SCORE: n/a   ABNL VS/O2: VSS on RA, except hypotension. Pt is on midodrine.   MOBILITY: Independent.   PAIN MANAGMENT: Reports generalized body aches, stomachake and shoulder pain, oxycodone x 1 given this shift  DIET: low sodium and low fat diet, fair appetite and tolerating.   BOWEL/BLADDER: Dialysis pt. No BM this shift. Dialysis run done today, 2L taken off.   ABNL LAB/BG: lab draw pending  DRAIN/DEVICES: PIV SL. L fistula, not used. R port for dialysis  TELEMETRY RHYTHM: n/a  SKIN: scattered bruises. L shoulder, mepilex in place, due to scab. Itchy dry skin, moisturizer applied.   TESTS/PROCEDURES: Dialysis done today. Paracentesis with 4L out done yesterday, 10-20-23  D/C DATE: Pending transfer to [facility] when bed is available. Nephrology and GI are following.   Discharge Barriers: awaiting [facility] bed  OTHER IMPORTANT INFO: n/a

## 2023-10-21 LAB
ALBUMIN SERPL BCG-MCNC: 3.2 G/DL (ref 3.5–5.2)
ALP SERPL-CCNC: 182 U/L (ref 40–129)
ALT SERPL W P-5'-P-CCNC: 7 U/L (ref 0–70)
ANION GAP SERPL CALCULATED.3IONS-SCNC: 12 MMOL/L (ref 7–15)
AST SERPL W P-5'-P-CCNC: 13 U/L (ref 0–45)
BACTERIA FLD CULT: NO GROWTH
BILIRUB SERPL-MCNC: 0.4 MG/DL
BUN SERPL-MCNC: 48.6 MG/DL (ref 8–23)
CALCIUM SERPL-MCNC: 8.7 MG/DL (ref 8.6–10)
CHLORIDE SERPL-SCNC: 91 MMOL/L (ref 98–107)
CREAT SERPL-MCNC: 5.43 MG/DL (ref 0.67–1.17)
DEPRECATED HCO3 PLAS-SCNC: 24 MMOL/L (ref 22–29)
EGFRCR SERPLBLD CKD-EPI 2021: 11 ML/MIN/1.73M2
ERYTHROCYTE [DISTWIDTH] IN BLOOD BY AUTOMATED COUNT: 17.7 % (ref 10–15)
GLUCOSE SERPL-MCNC: 116 MG/DL (ref 70–99)
HCT VFR BLD AUTO: 29.6 % (ref 40–53)
HGB BLD-MCNC: 9.1 G/DL (ref 13.3–17.7)
MCH RBC QN AUTO: 31.4 PG (ref 26.5–33)
MCHC RBC AUTO-ENTMCNC: 30.7 G/DL (ref 31.5–36.5)
MCV RBC AUTO: 102 FL (ref 78–100)
PLATELET # BLD AUTO: 169 10E3/UL (ref 150–450)
POTASSIUM SERPL-SCNC: 4.2 MMOL/L (ref 3.4–5.3)
PROT SERPL-MCNC: 6.4 G/DL (ref 6.4–8.3)
RBC # BLD AUTO: 2.9 10E6/UL (ref 4.4–5.9)
SODIUM SERPL-SCNC: 127 MMOL/L (ref 135–145)
WBC # BLD AUTO: 4.6 10E3/UL (ref 4–11)

## 2023-10-21 PROCEDURE — 36415 COLL VENOUS BLD VENIPUNCTURE: CPT | Performed by: HOSPITALIST

## 2023-10-21 PROCEDURE — 250N000013 HC RX MED GY IP 250 OP 250 PS 637: Performed by: HOSPITALIST

## 2023-10-21 PROCEDURE — 250N000009 HC RX 250: Performed by: PHYSICIAN ASSISTANT

## 2023-10-21 PROCEDURE — 85027 COMPLETE CBC AUTOMATED: CPT | Performed by: STUDENT IN AN ORGANIZED HEALTH CARE EDUCATION/TRAINING PROGRAM

## 2023-10-21 PROCEDURE — 250N000009 HC RX 250: Performed by: INTERNAL MEDICINE

## 2023-10-21 PROCEDURE — 250N000012 HC RX MED GY IP 250 OP 636 PS 637: Performed by: PHYSICIAN ASSISTANT

## 2023-10-21 PROCEDURE — 250N000011 HC RX IP 250 OP 636: Performed by: INTERNAL MEDICINE

## 2023-10-21 PROCEDURE — 250N000012 HC RX MED GY IP 250 OP 636 PS 637: Performed by: INTERNAL MEDICINE

## 2023-10-21 PROCEDURE — 80053 COMPREHEN METABOLIC PANEL: CPT | Performed by: HOSPITALIST

## 2023-10-21 PROCEDURE — 99232 SBSQ HOSP IP/OBS MODERATE 35: CPT | Performed by: STUDENT IN AN ORGANIZED HEALTH CARE EDUCATION/TRAINING PROGRAM

## 2023-10-21 PROCEDURE — 120N000001 HC R&B MED SURG/OB

## 2023-10-21 RX ADMIN — TACROLIMUS 0.5 MG: 5 CAPSULE ORAL at 09:12

## 2023-10-21 RX ADMIN — SEVELAMER CARBONATE 800 MG: 800 TABLET, FILM COATED ORAL at 06:59

## 2023-10-21 RX ADMIN — OXYCODONE HYDROCHLORIDE 2.5 MG: 5 TABLET ORAL at 21:15

## 2023-10-21 RX ADMIN — OXYCODONE HYDROCHLORIDE 2.5 MG: 5 TABLET ORAL at 09:00

## 2023-10-21 RX ADMIN — MELATONIN TAB 3 MG 3 MG: 3 TAB at 21:15

## 2023-10-21 RX ADMIN — HYDROXYZINE HYDROCHLORIDE 25 MG: 25 TABLET, FILM COATED ORAL at 09:00

## 2023-10-21 RX ADMIN — LACOSAMIDE 50 MG: 50 TABLET, FILM COATED ORAL at 08:51

## 2023-10-21 RX ADMIN — MIDODRINE HYDROCHLORIDE 10 MG: 5 TABLET ORAL at 11:49

## 2023-10-21 RX ADMIN — LACOSAMIDE 100 MG: 50 TABLET, FILM COATED ORAL at 20:38

## 2023-10-21 RX ADMIN — Medication 1 CAPSULE: at 08:52

## 2023-10-21 RX ADMIN — PANTOPRAZOLE SODIUM 40 MG: 40 TABLET, DELAYED RELEASE ORAL at 06:59

## 2023-10-21 RX ADMIN — SEVELAMER CARBONATE 800 MG: 800 TABLET, FILM COATED ORAL at 11:49

## 2023-10-21 RX ADMIN — APIXABAN 5 MG: 5 TABLET, FILM COATED ORAL at 08:51

## 2023-10-21 RX ADMIN — MIDODRINE HYDROCHLORIDE 10 MG: 5 TABLET ORAL at 08:52

## 2023-10-21 RX ADMIN — TACROLIMUS 0.5 MG: 5 CAPSULE ORAL at 17:11

## 2023-10-21 RX ADMIN — GUAIFENESIN 600 MG: 600 TABLET, EXTENDED RELEASE ORAL at 20:37

## 2023-10-21 RX ADMIN — SEVELAMER CARBONATE 800 MG: 800 TABLET, FILM COATED ORAL at 20:40

## 2023-10-21 RX ADMIN — GUAIFENESIN 600 MG: 600 TABLET, EXTENDED RELEASE ORAL at 08:52

## 2023-10-21 RX ADMIN — APIXABAN 5 MG: 5 TABLET, FILM COATED ORAL at 20:37

## 2023-10-21 RX ADMIN — SEVELAMER CARBONATE 800 MG: 800 TABLET, FILM COATED ORAL at 17:12

## 2023-10-21 RX ADMIN — CEFEPIME HYDROCHLORIDE 1 G: 1 INJECTION, POWDER, FOR SOLUTION INTRAMUSCULAR; INTRAVENOUS at 12:36

## 2023-10-21 RX ADMIN — MIDODRINE HYDROCHLORIDE 10 MG: 5 TABLET ORAL at 17:11

## 2023-10-21 RX ADMIN — GABAPENTIN 300 MG: 300 CAPSULE ORAL at 21:15

## 2023-10-21 ASSESSMENT — ACTIVITIES OF DAILY LIVING (ADL)
ADLS_ACUITY_SCORE: 26
ADLS_ACUITY_SCORE: 27
ADLS_ACUITY_SCORE: 26
ADLS_ACUITY_SCORE: 27
ADLS_ACUITY_SCORE: 26

## 2023-10-21 NOTE — PROGRESS NOTES
[facility]    Medicine Progress Note - Hospitalist Service    Date of Admission:  10/5/2023  Date of Service: 10/21/2023    Assessment & Plan     Blanco Osborne is a 58 year old male with extensive medical history that includes liver transplant in 2016 due to alcoholic liver disease, now with progressive cirrhosis and ascites, paroxysmal atrial fibrillation, on anticoagulation with Eliquis, diabetes mellitus type 2, previous CVA, hypertension, CHRISTIAN on BiPAP, chronic pericardial effusion, ESRD on hemodialysis who presented on 10/5/2023 with palpitations and chest discomfort.       He was hypotensive and had wide-complex tachycardia felt to be atrial fibrillation with aberrancy.  He failed 2 attempts of emergent cardioversion.  He was started on amiodarone and subsequently converted to sinus rhythm. He is now on po amiodarone     He received IV fluids but remained hypotensive and subsequently received pressors that were discontinued on 10/7. He had severe sepsis likely due to SBP and pneumonia. Blood and ascitic fluid cultures were negative. He was intially on vancomycin and zosyn. Vancomycin was discontinued on 10/8.     Troponins were elevated, felt to be demand ischemia.  Echo showed pericardial effusion without tamponade.  CT C/A/P showed PE and pulmonary infiltrates.  He was started on IV heparin.      Acute hypoxic respiratory failure-Resolved - multifactorial  -Clinically patient is on room air since yesterday night and was a combination of likely pneumonia versus fluid overload    Severe sepsis with septic shock - due to multifactorial pneumonia -resolved  Chronic hypotension, on midodrine  -Is normally on midodrine for chronic hypotension    -Initially presented with septic shock which has now resolved.  Off pressors since 10/7/2023.  Blood pressure currently in normal range  -Continue midodrine for chronic hypotension and of note patient on nondialysis takes it on  as needed  basis and after he was transferred from ICU he has been on scheduled 15 three times daily and in spite of that he does get hypotensive at times  -Note patient has been afebrile since he has been in the hospital, has completed 8 days of antibiotics, WBC count continues to be stable, no SBP on the ascitic fluid but in spite of that he continues to have lower blood pressure and he is on very high dose of midodrine as started by the ICU team  -Random cortisol level checked on 10/13 have been normal  -Status 25 g of 25% albumin on 10/14  -Dose of midodrine was cut down to 10 mg 3 times daily scheduled on 10/14 evening  -Infectious disease team was consulted yesterday and they wanted to see if patient can be transferred to [facility] currently they do not have beds and I did talk to transplant ID team as below  -We will monitor how his blood pressures are during the course of the day and patient only takes midodrine on as-needed basis and does take it for sure on days of dialysis    Infectious disease   -Blood cultures on 10/6/2023 have been negative  -C. difficile has been negative during this hospital stay  -Ascitic tap was done on 10/6 and aerobic, anaerobic have been negative and fungal or yeast have been negative so far and is predominantly lymphocytic and triglycerides are high and negative for malignancy  -Ascites tap on 10/10 the aerobic cultures are negative and is predominantly lymphocytic  -Pericardial tap on 10/11-negative for malignancy, Gram stain is negative, aerobic bacterial culture is negative  -Ascitic tap on 10/12-predominantly lymphocytic and triglycerides, flow cytometry shows polytypic B cells, aerobic cultures have not shown any growth  - He was checked for AFB in the ascitic fluid in January 2023  - Case discussed with transplant hepatology and noted transplant ID's request to transfer pt to [facility]. Working on transfer  - Continue cefepime per ID   - respiratory viral panel is  negative  - Strongyloides antibody ordered and pending rest  - No history of pets, exposure to ticks or exotic travel  - CMV is negative     End-stage liver disease, s/p liver transplant in 2016, now with recurrent ascites and Portal hypertension likely due to cirrhosis(status biopsy on 12/22 which showed poorly preserved liver with evidence of cirrhosis  s/p paracentesis on 10/6 and 10/11  Chylous ascites/ effusion  -On presentation on admission patient was found to have effusion and paracentesis was done and the there were 1119 nucleated cells and triglycerides were elevated at more than 400 and it was chylous effusion and patient has been treated with IV Zosyn for presumed SBP  - his differential count from 10/6 shows predominantly lymphocytes(77 per cent) and ANC is only 11.2 and he does not have SBP and his cultures have been negative including aerobic, anaerobic and fungal  -Status repeat paracentesis on 10/10 and 4.6 L of fluid was removed and triglycerides are elevated at 503, chylous effusion, local cultures again are negative and cell count  showed 761 nucleated cells with 73% lymphocytes, 22% monocytes and ANC of 26.6  -GI team from [place] has been following the patient and they recommended right heart cath his concern was that his ascites started at the time when he had cardiopulmonary symptoms and If his pressures are not elevated then future test may include transjugular liver biopsy and or hepatic vein wedge pressure measurement and that decision will be up to the transplant team of Dr. Simms and they are in touch with him  -right heart cath on 10/11 which shows normal right and left-sided filling pressures, mild elevated pulmonary pressures and portal hypertension with hepatic vein pressure gradient of 10 mmHg  -Most likely cause of portal hypertension is cirrhosis as evident on biopsy done on 12/22  - 10/12-10/12-Tacrolimus levels were high and transplant hepatology has been managing his  tacrolimus    -As per GI team note dated 10/13 on next Repeat the sciatic studies including triglyceridesTG (to evaluate if improved post restriction to low fat diet), cell count with diff, cultures, Alb, T.PRO.  -If TG improved on next paracentesis, would continue very low fat (LCT) diet for 2-3 weeks then liberalize fat restriction for LCT challenge and repeat TG on next paracentesis to evaluate if chylous ascites persists.  -If chylous ascites persists despite compliance to low fat diet, consider IR consult for lymphoscintigraphy (possibly with SPECT CT) to locate site of lymphatic leak for embolization of leak.   -No MCT supplementation in the setting of Cirrhosis (high risk for narcosis)   -Calories count  -Nutrition consulted  -10/14-d/w with Dr. Leventhal from transplant hepatology and he did start the patient back on tacrolimus at 1 mg and 0.5 mg and repeat levels to be done on Monday or Tuesday and I did discuss albumin and he generally prefers 25% given that volume is less  -10/16 -> repeat paracentesis with ascites studies: triglcyerides, AFB culture and stain, fungal cultures, cell count with diff, cultures, albumin and total protein. Give 25g IV albumin given symptomatic hypotension following HD and paracentesis    - Dscussed with transplant hepatology and noted transplant ID's request to transfer pt to [facility]. Working on transfer, currently on waitlist.     Multifocal pneumonia, organism unspecified  - CT scan showed pulmonary infiltrates   - COVID 19/influenza/RSV negative  - blood cultures negative till date from 10/5  - unable to provide sputum specimen  -vancomycin was discontinued on 10/8  -Patient received total of 7 to 8 days of IV Zosyn and then another day Augmentin and it was discontinued with last dose on 10/13    Diarrhea-improved  -C. difficile has been negative on 10/10     Pulmonary embolism in segmental and subsegmental pulmonary arteries of left upper lobe, without cor pulmonale -  Short  segment  thrombophlebitis in right cephalic vein at antecubital fossa, likely related to prior intravenous access  -Eliquis held on admission and was started on IV heparin  -Continue with Eliquis which was restarted on     Recurrent chronic pericardial effusion, s/p pericardiocentesis on 9/29.  Drain was removed on 9/30.   Status repeat pericardiocentesis on 10/11  - Felt to be due to volume overload due to renal failure   -  TTE 10/7 showed moderate pericardial effusion, slightly larger than seen on 10/5, with no evidence of tamponade physiology  - echo on 10/9 showed moderate to large bloody cardial effusion and as compared to 10/7 it has increased, IVC is mildly dilated, mild RV diastolic collapse and has inflow velocity variation consistent with hemodynamic compromise  -Status repeat pericardiocentesis with removal of 560 mL of fluid and it was bloody, red and there were 2558 nucleated cells, no organisms, 2 WBCs, glucose of 79, total protein of 4.9 and albumin level of 2.7 and ADA is pending along with cytology and he had post procedure repeat echo which showed excellent results after removal of fluid  -Drain was removed on 10/12  -Repeat echo as per cardiology -> Repeat echocardiogram 10/17 -> Hyperdynamic left ventricular systolic function. The visual ejection fraction is >70%. Normal left ventricular wall motion. The right ventricle is normal in structure, function and size. No pericardial efffusion  -If patient has repeat pericardiocentesis in future then sent for AFB/fungal culture, 16 S and 28 S    Recurrent chest discomfort  -He has been having on and off chest discomfort and initially during the course of hospitalization his troponin did peak at 346 on 10/5 and it was thought to be due to demand because of shock and EKG did not had any ST elevation and patient had rapid response yesterday and repeat troponin yesterday showed troponins have trended down to 307  -Echo as above  - discussed with cardiology  and as per them his chest discomfort was likely due to pericardial effusion  -In case in future he has recurrent chest discomfort he will need ischemic work-up  -Patient again and had episode of chest discomfort on 10/14 and was present on palpation and is similar to prior episodes and troponin was checked and was 320 and a repeat this morning is similar and has trended down and EKG did not show any ST elevation. Chest pain has now resolved.     Chronic paroxysmal atrial fibrillation, on anticoagulation with Eliquis  Wide-complex tachycardia on admission felt to be atrial fibrillation with aberrant conduction, failed cardioversion x 2  -Patient was started on amiodarone and converted to normal sinus rhythm and is currently on oral amiodarone  -Family did wanted to discuss about ongoing use of amiodarone as it has a lot of side effects and can interfere with tacrolimus levels and we will consult cardiology team again for tomorrow morning     ESRD, on hemodialysis q. Monday, Wednesday, Friday  S/p left AV fistula pseudoaneurysm repair, 8/2023  His AV fistula on the left upper extremity infiltrated on 10/6-  - continue dialysis per nephrology and continue with renal multivitamin and sevelamer     Small bilateral pleural effusions  Chronically loculated right pleural effusion  - stable       Generalized anxiety disorder  Restless leg syndrome  -On hydroxyzine and ropinirole     Chronic anemia-due to anemia of chronic disease  -Hemoglobin is baseline between 8-9  -Hemoglobin is stable today at 7.9 on 10/15  -We will check labs intermittently     chronic thrombocytopenia  -Platelet count this morning is 145    Seizure disorder  - continue Vimpat     GERD  -Continue PPI     Probable CHRISTIAN  --Per patient's wife, he had a sleep study about 10 years ago but does not have any CPAP or BiPAP at home  -Did use BiPAP last night  -Need to  undergo sleep study as outpatient after discharge     Previous embolic strokes     Generalized  weakness  -Continue with physical therapy and Occupational Therapy.     Pain at multiple locations  -Has pain in left upper extremity due to infiltration of AV fistula, pain in right upper extremity due to thrombophlebitis and frequent lab draws.  Also complains of diffuse pain in shoulders and upper back  -continue Tylenol as needed  -IV Dilaudid 0.2 mg every 6 hours as needed and Percocet every 8 hours as needed     Diet: Snacks/Supplements Adult: Gelatein Plus; Between Meals  Room Service  Combination Diet 2 gm NA Diet; Very Low Fat Diet (up to 10g)  Snacks/Supplements Adult: Ensure Clear; With Meals  Diet    DVT Prophylaxis: Heparin drip  Nixon Catheter: Not present  Lines: PRESENT      Hemodialysis Vascular Access Right Subclavian-Site Assessment: WDL  Hemodialysis Vascular Access Arteriovenous fistula Left Forearm-Site Assessment: WDL;Thrill present      Cardiac Monitoring: None  Code Status: Full Code      Clinically Significant Risk Factors         # Hyponatremia: Lowest Na = 127 mmol/L in last 2 days, will monitor as appropriate      # Hypoalbuminemia: Lowest albumin = 2.8 g/dL at 10/17/2023  6:33 AM, will monitor as appropriate         # Hypertension: Noted on problem list           # Moderate Malnutrition: based on nutrition assessment           Disposition Plan      Expected Discharge Date: 10/22/2023      Destination: home with family;inpatient rehabilitation facility  Discharge Comments: 10-11 angio and pericardiocentesis and HD          Franky Monet MD  Hospitalist Service  [facility]  Securely message with [employer] (more info)  Text page via [employer] Paging/Directory   _  _____________________________________________________________________    Interval History     No acute events overnight  He denies any lightheadedness or dizziness.    No CP/SOB  No fevers  Working on transfer to [facility], waiting for bed availability  Otherwise doing well     Physical Exam     Temp: 97.6  F (36.4   C) Temp src: Axillary BP: 100/60 Pulse: 73   Resp: 16 SpO2: 97 % O2 Device: None (Room air)       General: Patient appears comfortable and in no acute distress.  Respiratory: Lungs are clear to auscultation bilaterally with no wheeze or crackles and has right-sided permacath  Cardiovascular: Regular rate , S1 and S2 normal with no murmer or rubs or gallops  Abdomen:   soft , non tender , mild distended and bowel sounds are present   Skin: No skin rashes   Neurologic:  No facial droop  Musculoskeletal: Normal Range of motion over upper and lower extremities bilaterally   Psychiatric: cooperative          Medical Decision Making       Time spent in care of patient is 35 minutes and I discussed plan of care with the patient, nurse , and discussed plan with the patient's brother and the family     Data     I have personally reviewed the following data over the past 24 hrs:    4.6  \   9.1 (L)   / 169     127 (L) 91 (L) 48.6 (H) /  116 (H)   4.2 24 5.43 (H) \     ALT: 7 AST: 13 AP: 182 (H) TBILI: 0.4   ALB: 3.2 (L) TOT PROTEIN: 6.4 LIPASE: N/A       Imaging results reviewed over the past 24 hrs:   No results found for this or any previous visit (from the past 24 hour(s)).

## 2023-10-21 NOTE — PROGRESS NOTES
1500 - 2330                  Cognitive Concerns/ Orientation: A/Ox4.    BEHAVIOR & AGGRESSION TOOL COLOR: Green  ABNL VS/O2: VSS on RA, except hypotension. Pt is on midodrine.   MOBILITY: Independent.   PAIN MANAGMENT: Reports generalized body aches, stomachache and shoulder pain, oxycodone x 1 given this shift  DIET: low sodium and low fat diet, fair appetite and tolerating.   BOWEL/BLADDER: Dialysis pt. No BM this shift. Dialysis run done today, 2L taken off.   ABNL LAB/BG: Comp. Metabolic Panel drawn  DRAIN/DEVICES: PIV SL. L fistula, not used. R port for dialysis  TELEMETRY RHYTHM: N/A  SKIN: scattered bruises. L shoulder, mepilex in place, due to scab. Itchy dry skin, moisturizer applied.   TESTS/PROCEDURES: Dialysis done today. Paracentesis with 4L out done yesterday, 10-20-23  D/C DATE: Pending transfer to [facility] when bed is available. Nephrology and GI are following.   Discharge Barriers: awaiting [facility] bed

## 2023-10-21 NOTE — PLAN OF CARE
Goal Outcome Evaluation:             Orientation: A&Ox4    Activity: Independent    Diet/BS Checks: Low fat, Na<2400    Tele: N/A    IV Access/Drains: R PIV SL, R CVC, L arm fistula    Pain Management: PRN oxy x1 for L arm pain - effective    Abnormal VS/Results: VSS on RA ex soft BP    Bowel/Bladder: Continent,   Skin/Wounds: Bruising to L arm and perineum, L elbow skin tear    Consults: ID, Neph, GI, Cardio, SW, PT, OT    D/C Disposition: Pending placement    Other Info: Walked in hallway x3

## 2023-10-22 VITALS
BODY MASS INDEX: 25.29 KG/M2 | TEMPERATURE: 98 F | HEIGHT: 72 IN | SYSTOLIC BLOOD PRESSURE: 108 MMHG | WEIGHT: 186.7 LBS | HEART RATE: 69 BPM | RESPIRATION RATE: 16 BRPM | DIASTOLIC BLOOD PRESSURE: 69 MMHG | OXYGEN SATURATION: 98 %

## 2023-10-22 LAB
ALBUMIN SERPL BCG-MCNC: 2.8 G/DL (ref 3.5–5.2)
ALP SERPL-CCNC: 164 U/L (ref 40–129)
ALT SERPL W P-5'-P-CCNC: 6 U/L (ref 0–70)
ANION GAP SERPL CALCULATED.3IONS-SCNC: 11 MMOL/L (ref 7–15)
AST SERPL W P-5'-P-CCNC: 13 U/L (ref 0–45)
BILIRUB SERPL-MCNC: 0.3 MG/DL
BUN SERPL-MCNC: 74.8 MG/DL (ref 8–23)
CALCIUM SERPL-MCNC: 8.4 MG/DL (ref 8.6–10)
CHLORIDE SERPL-SCNC: 91 MMOL/L (ref 98–107)
CREAT SERPL-MCNC: 6.9 MG/DL (ref 0.67–1.17)
DEPRECATED HCO3 PLAS-SCNC: 23 MMOL/L (ref 22–29)
EGFRCR SERPLBLD CKD-EPI 2021: 9 ML/MIN/1.73M2
ERYTHROCYTE [DISTWIDTH] IN BLOOD BY AUTOMATED COUNT: 17 % (ref 10–15)
GLUCOSE SERPL-MCNC: 126 MG/DL (ref 70–99)
HCT VFR BLD AUTO: 26 % (ref 40–53)
HGB BLD-MCNC: 8.3 G/DL (ref 13.3–17.7)
LABORATORY REPORT: NORMAL
MCH RBC QN AUTO: 31.4 PG (ref 26.5–33)
MCHC RBC AUTO-ENTMCNC: 31.9 G/DL (ref 31.5–36.5)
MCV RBC AUTO: 99 FL (ref 78–100)
PLATELET # BLD AUTO: 175 10E3/UL (ref 150–450)
PLPETH BLD-MCNC: <10 NG/ML
POPETH BLD-MCNC: <10 NG/ML
POTASSIUM SERPL-SCNC: 4.7 MMOL/L (ref 3.4–5.3)
PROT SERPL-MCNC: 5.7 G/DL (ref 6.4–8.3)
RBC # BLD AUTO: 2.64 10E6/UL (ref 4.4–5.9)
SODIUM SERPL-SCNC: 125 MMOL/L (ref 135–145)
WBC # BLD AUTO: 6.2 10E3/UL (ref 4–11)

## 2023-10-22 PROCEDURE — 250N000012 HC RX MED GY IP 250 OP 636 PS 637: Performed by: INTERNAL MEDICINE

## 2023-10-22 PROCEDURE — 250N000011 HC RX IP 250 OP 636: Performed by: INTERNAL MEDICINE

## 2023-10-22 PROCEDURE — 85027 COMPLETE CBC AUTOMATED: CPT | Performed by: INTERNAL MEDICINE

## 2023-10-22 PROCEDURE — 250N000009 HC RX 250: Performed by: PHYSICIAN ASSISTANT

## 2023-10-22 PROCEDURE — 36415 COLL VENOUS BLD VENIPUNCTURE: CPT | Performed by: HOSPITALIST

## 2023-10-22 PROCEDURE — 250N000013 HC RX MED GY IP 250 OP 250 PS 637: Performed by: HOSPITALIST

## 2023-10-22 PROCEDURE — 120N000001 HC R&B MED SURG/OB

## 2023-10-22 PROCEDURE — 99232 SBSQ HOSP IP/OBS MODERATE 35: CPT | Performed by: STUDENT IN AN ORGANIZED HEALTH CARE EDUCATION/TRAINING PROGRAM

## 2023-10-22 PROCEDURE — 80053 COMPREHEN METABOLIC PANEL: CPT | Performed by: HOSPITALIST

## 2023-10-22 PROCEDURE — 250N000012 HC RX MED GY IP 250 OP 636 PS 637: Performed by: PHYSICIAN ASSISTANT

## 2023-10-22 PROCEDURE — 36415 COLL VENOUS BLD VENIPUNCTURE: CPT | Performed by: INTERNAL MEDICINE

## 2023-10-22 RX ORDER — TACROLIMUS 0.5 MG/1
0.5 CAPSULE ORAL
Status: DISCONTINUED | OUTPATIENT
Start: 2023-10-22 | End: 2023-10-23 | Stop reason: HOSPADM

## 2023-10-22 RX ADMIN — LACOSAMIDE 50 MG: 50 TABLET, FILM COATED ORAL at 09:58

## 2023-10-22 RX ADMIN — MIDODRINE HYDROCHLORIDE 10 MG: 5 TABLET ORAL at 09:58

## 2023-10-22 RX ADMIN — SEVELAMER CARBONATE 800 MG: 800 TABLET, FILM COATED ORAL at 07:27

## 2023-10-22 RX ADMIN — MIDODRINE HYDROCHLORIDE 10 MG: 5 TABLET ORAL at 17:20

## 2023-10-22 RX ADMIN — GUAIFENESIN 600 MG: 600 TABLET, EXTENDED RELEASE ORAL at 09:58

## 2023-10-22 RX ADMIN — OXYCODONE HYDROCHLORIDE 2.5 MG: 5 TABLET ORAL at 21:18

## 2023-10-22 RX ADMIN — CEFEPIME HYDROCHLORIDE 1 G: 1 INJECTION, POWDER, FOR SOLUTION INTRAMUSCULAR; INTRAVENOUS at 12:57

## 2023-10-22 RX ADMIN — MELATONIN TAB 3 MG 3 MG: 3 TAB at 21:12

## 2023-10-22 RX ADMIN — APIXABAN 5 MG: 5 TABLET, FILM COATED ORAL at 21:12

## 2023-10-22 RX ADMIN — HYDROXYZINE HYDROCHLORIDE 25 MG: 25 TABLET, FILM COATED ORAL at 22:53

## 2023-10-22 RX ADMIN — TACROLIMUS 0.5 MG: 5 CAPSULE ORAL at 09:58

## 2023-10-22 RX ADMIN — APIXABAN 5 MG: 5 TABLET, FILM COATED ORAL at 09:58

## 2023-10-22 RX ADMIN — GABAPENTIN 300 MG: 300 CAPSULE ORAL at 21:12

## 2023-10-22 RX ADMIN — SEVELAMER CARBONATE 800 MG: 800 TABLET, FILM COATED ORAL at 11:57

## 2023-10-22 RX ADMIN — PANTOPRAZOLE SODIUM 40 MG: 40 TABLET, DELAYED RELEASE ORAL at 06:49

## 2023-10-22 RX ADMIN — MIDODRINE HYDROCHLORIDE 10 MG: 5 TABLET ORAL at 11:57

## 2023-10-22 RX ADMIN — GUAIFENESIN 600 MG: 600 TABLET, EXTENDED RELEASE ORAL at 21:12

## 2023-10-22 RX ADMIN — SEVELAMER CARBONATE 800 MG: 800 TABLET, FILM COATED ORAL at 17:20

## 2023-10-22 RX ADMIN — Medication 1 CAPSULE: at 09:58

## 2023-10-22 RX ADMIN — LACOSAMIDE 100 MG: 50 TABLET, FILM COATED ORAL at 21:11

## 2023-10-22 RX ADMIN — TACROLIMUS 0.5 MG: 0.5 CAPSULE ORAL at 21:12

## 2023-10-22 ASSESSMENT — ACTIVITIES OF DAILY LIVING (ADL)
ADLS_ACUITY_SCORE: 26
ADLS_ACUITY_SCORE: 22
ADLS_ACUITY_SCORE: 26
ADLS_ACUITY_SCORE: 22
ADLS_ACUITY_SCORE: 22

## 2023-10-22 NOTE — PROGRESS NOTES
10/21,7080 - 2278  Dx:Acute hypoxic respiratory failure and severe sepsis with septic shock     Orientation: A&Ox4  Activity: Independent  Diet/BS Checks: Very low fat, 2 gm NA diet  Tele: N/A  IV Access/Drains: R PIV SL, R CVC, L arm fistula  Pain Management: PRN oxy x1 for L arm pain - effective  Abnormal VS/Results: VSS on RA ex soft BP  Bowel/Bladder: Continent,   Skin/Wounds: Bruising to L arm and groin area, L elbow skin tear  Consults: ID, Neph, GI, Cardio, SW, PT, OT  D/C Disposition: 10/22 to home with family?? transfer to [facility] when bed available ??  Other Info: Pt on Hemodialysis MWF  Up walking in the hallway this morning.

## 2023-10-22 NOTE — PLAN OF CARE
Pt had a pretty good day. Vitals stable. A&O, ambulating halls independently. Eating well. CVC to R chest CDI, L arm fistula present- would like Dr. Mcneil to re-eval maturity tomorrow. Abd rounded- last para 10/19, monitoring cultures. Nephrology following- plan for Dialysis sancho. ID and GI following peripherally over the weekend, need clarification sancho if [facility] still needs to [facility] or stable for discharge with outpatient follow up w/ transplant team.

## 2023-10-22 NOTE — DISCHARGE SUMMARY
[facility]    Medicine Progress Note - Hospitalist Service    Date of Admission:  10/5/2023  Date of Service: 10/22/2023    Assessment & Plan     Blanco Osborne is a 58 year old male with extensive medical history that includes liver transplant in 2016 due to alcoholic liver disease, now with progressive cirrhosis and ascites, paroxysmal atrial fibrillation, on anticoagulation with Eliquis, diabetes mellitus type 2, previous CVA, hypertension, CHRISTIAN on BiPAP, chronic pericardial effusion, ESRD on hemodialysis who presented on 10/5/2023 with palpitations and chest discomfort.       He was hypotensive and had wide-complex tachycardia felt to be atrial fibrillation with aberrancy.  He failed 2 attempts of emergent cardioversion.  He was started on amiodarone and subsequently converted to sinus rhythm. He is now on po amiodarone     He received IV fluids but remained hypotensive and subsequently received pressors that were discontinued on 10/7. He had severe sepsis likely due to SBP and pneumonia. Blood and ascitic fluid cultures were negative. He was intially on vancomycin and zosyn. Vancomycin was discontinued on 10/8.     Troponins were elevated, felt to be demand ischemia.  Echo showed pericardial effusion without tamponade.  CT C/A/P showed PE and pulmonary infiltrates.  He was started on IV heparin.      Acute hypoxic respiratory failure-Resolved - multifactorial  -Clinically patient is on room air since yesterday night and was a combination of likely pneumonia versus fluid overload    Severe sepsis with septic shock - due to multifactorial pneumonia -resolved  Chronic hypotension, on midodrine  -Is normally on midodrine for chronic hypotension    -Initially presented with septic shock which has now resolved.  Off pressors since 10/7/2023.  Blood pressure currently in normal range  -Continue midodrine for chronic hypotension and of note patient on nondialysis takes it on  as needed  basis and after he was transferred from ICU he has been on scheduled 15 three times daily and in spite of that he does get hypotensive at times  -Note patient has been afebrile since he has been in the hospital, has completed 8 days of antibiotics, WBC count continues to be stable, no SBP on the ascitic fluid but in spite of that he continues to have lower blood pressure and he is on very high dose of midodrine as started by the ICU team  -Random cortisol level checked on 10/13 have been normal  -Status 25 g of 25% albumin on 10/14  -Dose of midodrine was cut down to 10 mg 3 times daily scheduled on 10/14 evening  -Infectious disease team was consulted yesterday and they wanted to see if patient can be transferred to [facility] currently they do not have beds and I did talk to transplant ID team as below  -We will monitor how his blood pressures are during the course of the day and patient only takes midodrine on as-needed basis and does take it for sure on days of dialysis    Infectious disease   -Blood cultures on 10/6/2023 have been negative  -C. difficile has been negative during this hospital stay  -Ascitic tap was done on 10/6 and aerobic, anaerobic have been negative and fungal or yeast have been negative so far and is predominantly lymphocytic and triglycerides are high and negative for malignancy  -Ascites tap on 10/10 the aerobic cultures are negative and is predominantly lymphocytic  -Pericardial tap on 10/11-negative for malignancy, Gram stain is negative, aerobic bacterial culture is negative  -Ascitic tap on 10/12-predominantly lymphocytic and triglycerides, flow cytometry shows polytypic B cells, aerobic cultures have not shown any growth  - He was checked for AFB in the ascitic fluid in January 2023  - Case discussed with transplant hepatology and noted transplant ID's request to transfer pt to [facility]. Working on transfer  - Continue cefepime per ID   - respiratory viral panel is  negative  - Strongyloides antibody ordered and pending rest  - No history of pets, exposure to ticks or exotic travel  - CMV is negative     End-stage liver disease, s/p liver transplant in 2016, now with recurrent ascites and Portal hypertension likely due to cirrhosis(status biopsy on 12/22 which showed poorly preserved liver with evidence of cirrhosis  s/p paracentesis on 10/6 and 10/11  Chylous ascites/ effusion  -On presentation on admission patient was found to have effusion and paracentesis was done and the there were 1119 nucleated cells and triglycerides were elevated at more than 400 and it was chylous effusion and patient has been treated with IV Zosyn for presumed SBP  - his differential count from 10/6 shows predominantly lymphocytes(77 per cent) and ANC is only 11.2 and he does not have SBP and his cultures have been negative including aerobic, anaerobic and fungal  -Status repeat paracentesis on 10/10 and 4.6 L of fluid was removed and triglycerides are elevated at 503, chylous effusion, local cultures again are negative and cell count  showed 761 nucleated cells with 73% lymphocytes, 22% monocytes and ANC of 26.6  -GI team from [place] has been following the patient and they recommended right heart cath his concern was that his ascites started at the time when he had cardiopulmonary symptoms and If his pressures are not elevated then future test may include transjugular liver biopsy and or hepatic vein wedge pressure measurement and that decision will be up to the transplant team of Dr. Simms and they are in touch with him  -right heart cath on 10/11 which shows normal right and left-sided filling pressures, mild elevated pulmonary pressures and portal hypertension with hepatic vein pressure gradient of 10 mmHg  -Most likely cause of portal hypertension is cirrhosis as evident on biopsy done on 12/22  - 10/12-10/12-Tacrolimus levels were high and transplant hepatology has been managing his  tacrolimus    -As per GI team note dated 10/13 on next Repeat the sciatic studies including triglyceridesTG (to evaluate if improved post restriction to low fat diet), cell count with diff, cultures, Alb, T.PRO.  -If TG improved on next paracentesis, would continue very low fat (LCT) diet for 2-3 weeks then liberalize fat restriction for LCT challenge and repeat TG on next paracentesis to evaluate if chylous ascites persists.  -If chylous ascites persists despite compliance to low fat diet, consider IR consult for lymphoscintigraphy (possibly with SPECT CT) to locate site of lymphatic leak for embolization of leak.   -No MCT supplementation in the setting of Cirrhosis (high risk for narcosis)   -Calories count  -Nutrition consulted  -10/14-d/w with Dr. Leventhal from transplant hepatology and he did start the patient back on tacrolimus at 1 mg and 0.5 mg and repeat levels to be done on Monday or Tuesday and I did discuss albumin and he generally prefers 25% given that volume is less  -10/16 -> repeat paracentesis with ascites studies: triglcyerides, AFB culture and stain, fungal cultures, cell count with diff, cultures, albumin and total protein. Give 25g IV albumin given symptomatic hypotension following HD and paracentesis    - Dscussed with transplant hepatology and noted transplant ID's request to transfer pt to [facility]. Working on transfer, currently on waitlist.     Multifocal pneumonia, organism unspecified  - CT scan showed pulmonary infiltrates   - COVID 19/influenza/RSV negative  - blood cultures negative till date from 10/5  - unable to provide sputum specimen  -vancomycin was discontinued on 10/8  -Patient received total of 7 to 8 days of IV Zosyn and then another day Augmentin and it was discontinued with last dose on 10/13    Diarrhea-improved  -C. difficile has been negative on 10/10     Pulmonary embolism in segmental and subsegmental pulmonary arteries of left upper lobe, without cor pulmonale -  Short  segment  thrombophlebitis in right cephalic vein at antecubital fossa, likely related to prior intravenous access  -Eliquis held on admission and was started on IV heparin -> back to Eliquis  -Continue with Eliquis     Recurrent chronic pericardial effusion, s/p pericardiocentesis on 9/29.  Drain was removed on 9/30.   Status repeat pericardiocentesis on 10/11  - Felt to be due to volume overload due to renal failure   -  TTE 10/7 showed moderate pericardial effusion, slightly larger than seen on 10/5, with no evidence of tamponade physiology  - echo on 10/9 showed moderate to large bloody cardial effusion and as compared to 10/7 it has increased, IVC is mildly dilated, mild RV diastolic collapse and has inflow velocity variation consistent with hemodynamic compromise  -Status repeat pericardiocentesis with removal of 560 mL of fluid and it was bloody, red and there were 2558 nucleated cells, no organisms, 2 WBCs, glucose of 79, total protein of 4.9 and albumin level of 2.7 and ADA is pending along with cytology and he had post procedure repeat echo which showed excellent results after removal of fluid  -Drain was removed on 10/12  -Repeat echo as per cardiology -> Repeat echocardiogram 10/17 -> Hyperdynamic left ventricular systolic function. The visual ejection fraction is >70%. Normal left ventricular wall motion. The right ventricle is normal in structure, function and size. No pericardial efffusion  -If patient has repeat pericardiocentesis in future then sent for AFB/fungal culture, 16 S and 28 S    Recurrent chest discomfort  -He has been having on and off chest discomfort and initially during the course of hospitalization his troponin did peak at 346 on 10/5 and it was thought to be due to demand because of shock and EKG did not had any ST elevation and patient had rapid response yesterday and repeat troponin yesterday showed troponins have trended down to 307  -Echo as above  - discussed with cardiology and  as per them his chest discomfort was likely due to pericardial effusion  -In case in future he has recurrent chest discomfort he will need ischemic work-up  -Patient again and had episode of chest discomfort on 10/14 and was present on palpation and is similar to prior episodes and troponin was checked and was 320 and a repeat this morning is similar and has trended down and EKG did not show any ST elevation. Chest pain has now resolved.     Chronic paroxysmal atrial fibrillation, on anticoagulation with Eliquis  Wide-complex tachycardia on admission felt to be atrial fibrillation with aberrant conduction, failed cardioversion x 2  -Patient was started on amiodarone and converted to normal sinus rhythm and was on oral amiodarone -> amio now stopped  -Family did wanted to discuss about ongoing use of amiodarone as it has a lot of side effects and can interfere with tacrolimus levels    ESRD, on hemodialysis q. Monday, Wednesday, Friday  S/p left AV fistula pseudoaneurysm repair, 8/2023  His AV fistula on the left upper extremity infiltrated on 10/6-  - continue dialysis per nephrology and continue with renal multivitamin and sevelamer     Small bilateral pleural effusions  Chronically loculated right pleural effusion  - stable       Generalized anxiety disorder  Restless leg syndrome  -On hydroxyzine and ropinirole     Chronic anemia-due to anemia of chronic disease  -Hemoglobin is baseline between 8-9  -Hemoglobin is stable today at 7.9 on 10/15  -We will check labs intermittently     chronic thrombocytopenia  -Platelet count this morning is 145    Seizure disorder  - continue Vimpat     GERD  -Continue PPI     Probable CHRISTIAN  --Per patient's wife, he had a sleep study about 10 years ago but does not have any CPAP or BiPAP at home  -Did use BiPAP last night  -Should undergo sleep study as outpatient after discharge     Previous embolic strokes     Generalized weakness  -Continue with physical therapy and Occupational  Therapy.     Pain at multiple locations  -Has pain in left upper extremity due to infiltration of AV fistula, pain in right upper extremity due to thrombophlebitis and frequent lab draws.  Also complains of diffuse pain in shoulders and upper back  -continue Tylenol as needed  -IV Dilaudid 0.2 mg every 6 hours as needed and Percocet every 8 hours as needed     Diet: Snacks/Supplements Adult: Gelatein Plus; Between Meals  Room Service  Combination Diet 2 gm NA Diet; Very Low Fat Diet (up to 10g)  Snacks/Supplements Adult: Ensure Clear; With Meals  Diet    DVT Prophylaxis: Heparin drip  Nixon Catheter: Not present  Lines: PRESENT      Hemodialysis Vascular Access Right Subclavian-Site Assessment: WDL  Hemodialysis Vascular Access Arteriovenous fistula Left Forearm-Site Assessment: WDL      Cardiac Monitoring: None  Code Status: Full Code      Clinically Significant Risk Factors         # Hyponatremia: Lowest Na = 125 mmol/L in last 2 days, will monitor as appropriate      # Hypoalbuminemia: Lowest albumin = 2.8 g/dL at 10/22/2023  9:59 AM, will monitor as appropriate         # Hypertension: Noted on problem list        # Overweight: Estimated body mass index is 25.32 kg/m  as calculated from the following:    Height as of this encounter: 1.829 m (6').    Weight as of this encounter: 84.7 kg (186 lb 11.2 oz).     # Moderate Malnutrition: based on nutrition assessment           Disposition Plan      Expected Discharge Date: 10/22/2023      Destination: home with family;inpatient rehabilitation facility  Discharge Comments: 10-11 angio and pericardiocentesis and HD          Franky Monet MD  Hospitalist Service  [employer]  Securely message with [employer] (more info)  Text page via [employer] Paging/Directory   _  _____________________________________________________________________    Interval History     No acute events overnight  He denies any lightheadedness or dizziness.    No CP/SOB  No  fevers  Working on transfer to [facility], waiting for bed availability. Patient wondering if he could discharge home in coming days vs transfer.  Otherwise doing well     Physical Exam     Temp: 97.8  F (36.6  C) Temp src: Oral BP: 96/59 Pulse: 73   Resp: 16 SpO2: 98 % O2 Device: None (Room air)       General: Patient appears comfortable and in no acute distress.  Respiratory: Lungs are clear to auscultation bilaterally with no wheeze or crackles and has right-sided permacath  Cardiovascular: Regular rate , S1 and S2 normal with no murmer or rubs or gallops  Abdomen:   soft , non tender , mild distended and bowel sounds are present   Skin: No skin rashes   Neurologic:  No facial droop  Musculoskeletal: Normal Range of motion over upper and lower extremities bilaterally   Psychiatric: cooperative          Medical Decision Making       Time spent in care of patient is 35 minutes and I discussed plan of care with the patient, nurse , and discussed plan with the patient's brother and the family     Data     I have personally reviewed the following data over the past 24 hrs:    N/A  \   N/A   / N/A     125 (L) 91 (L) 74.8 (H) /  126 (H)   4.7 23 6.90 (H) \     ALT: 6 AST: 13 AP: 164 (H) TBILI: 0.3   ALB: 2.8 (L) TOT PROTEIN: 5.7 (L) LIPASE: N/A       Imaging results reviewed over the past 24 hrs:   No results found for this or any previous visit (from the past 24 hour(s)).

## 2023-10-23 ENCOUNTER — HOSPITAL ENCOUNTER (INPATIENT)
Facility: CLINIC | Age: 58
LOS: 9 days | Discharge: HOME | DRG: 280 | End: 2023-11-01
Attending: INTERNAL MEDICINE | Admitting: STUDENT IN AN ORGANIZED HEALTH CARE EDUCATION/TRAINING PROGRAM
Payer: COMMERCIAL

## 2023-10-23 DIAGNOSIS — Z94.4 LIVER TRANSPLANTED (H): ICD-10-CM

## 2023-10-23 DIAGNOSIS — K74.60 CIRRHOSIS OF LIVER WITH ASCITES, UNSPECIFIED HEPATIC CIRRHOSIS TYPE (H): Primary | ICD-10-CM

## 2023-10-23 DIAGNOSIS — L29.9 ITCHING: ICD-10-CM

## 2023-10-23 DIAGNOSIS — T82.590S DIALYSIS AV FISTULA MALFUNCTION, SEQUELA: ICD-10-CM

## 2023-10-23 DIAGNOSIS — R79.89 ELEVATED TROPONIN: ICD-10-CM

## 2023-10-23 DIAGNOSIS — R18.8 OTHER ASCITES: ICD-10-CM

## 2023-10-23 DIAGNOSIS — D84.9 IMMUNOSUPPRESSION (H): ICD-10-CM

## 2023-10-23 DIAGNOSIS — R18.8 CIRRHOSIS OF LIVER WITH ASCITES, UNSPECIFIED HEPATIC CIRRHOSIS TYPE (H): Primary | ICD-10-CM

## 2023-10-23 DIAGNOSIS — K76.0 NAFLD (NONALCOHOLIC FATTY LIVER DISEASE): ICD-10-CM

## 2023-10-23 DIAGNOSIS — K65.2 SBP (SPONTANEOUS BACTERIAL PERITONITIS) (H): ICD-10-CM

## 2023-10-23 PROBLEM — R65.21 SEPTIC SHOCK (H): Status: ACTIVE | Noted: 2023-10-23

## 2023-10-23 PROBLEM — A41.9 SEPTIC SHOCK (H): Status: ACTIVE | Noted: 2023-10-23

## 2023-10-23 LAB
ALBUMIN SERPL BCG-MCNC: 3.2 G/DL (ref 3.5–5.2)
ALP SERPL-CCNC: 177 U/L (ref 40–129)
ALT SERPL W P-5'-P-CCNC: <5 U/L (ref 0–70)
ANION GAP SERPL CALCULATED.3IONS-SCNC: 15 MMOL/L (ref 7–15)
AST SERPL W P-5'-P-CCNC: 17 U/L (ref 0–45)
BASO+EOS+MONOS # BLD AUTO: ABNORMAL 10*3/UL
BASO+EOS+MONOS NFR BLD AUTO: ABNORMAL %
BASOPHILS # BLD AUTO: 0.1 10E3/UL (ref 0–0.2)
BASOPHILS NFR BLD AUTO: 1 %
BILIRUB SERPL-MCNC: 0.4 MG/DL
BUN SERPL-MCNC: 87.9 MG/DL (ref 8–23)
CALCIUM SERPL-MCNC: 8.8 MG/DL (ref 8.6–10)
CHLORIDE SERPL-SCNC: 87 MMOL/L (ref 98–107)
CREAT SERPL-MCNC: 8.03 MG/DL (ref 0.67–1.17)
DEPRECATED HCO3 PLAS-SCNC: 19 MMOL/L (ref 22–29)
EGFRCR SERPLBLD CKD-EPI 2021: 7 ML/MIN/1.73M2
EOSINOPHIL # BLD AUTO: 0.3 10E3/UL (ref 0–0.7)
EOSINOPHIL NFR BLD AUTO: 6 %
ERYTHROCYTE [DISTWIDTH] IN BLOOD BY AUTOMATED COUNT: 17.2 % (ref 10–15)
GLUCOSE SERPL-MCNC: 107 MG/DL (ref 70–99)
HCT VFR BLD AUTO: 28 % (ref 40–53)
HGB BLD-MCNC: 9 G/DL (ref 13.3–17.7)
IMM GRANULOCYTES # BLD: 0 10E3/UL
IMM GRANULOCYTES NFR BLD: 1 %
LYMPHOCYTES # BLD AUTO: 1.7 10E3/UL (ref 0.8–5.3)
LYMPHOCYTES NFR BLD AUTO: 28 %
MCH RBC QN AUTO: 31.8 PG (ref 26.5–33)
MCHC RBC AUTO-ENTMCNC: 32.1 G/DL (ref 31.5–36.5)
MCV RBC AUTO: 99 FL (ref 78–100)
MONOCYTES # BLD AUTO: 0.8 10E3/UL (ref 0–1.3)
MONOCYTES NFR BLD AUTO: 13 %
NEUTROPHILS # BLD AUTO: 3.1 10E3/UL (ref 1.6–8.3)
NEUTROPHILS NFR BLD AUTO: 51 %
NRBC # BLD AUTO: 0 10E3/UL
NRBC BLD AUTO-RTO: 0 /100
PHOSPHATE SERPL-MCNC: 3.1 MG/DL (ref 2.5–4.5)
PLATELET # BLD AUTO: 166 10E3/UL (ref 150–450)
POTASSIUM SERPL-SCNC: 5.5 MMOL/L (ref 3.4–5.3)
PROT SERPL-MCNC: 5.9 G/DL (ref 6.4–8.3)
RBC # BLD AUTO: 2.83 10E6/UL (ref 4.4–5.9)
SODIUM SERPL-SCNC: 121 MMOL/L (ref 135–145)
TACROLIMUS BLD-MCNC: 4.8 UG/L (ref 5–15)
TME LAST DOSE: ABNORMAL H
TME LAST DOSE: ABNORMAL H
WBC # BLD AUTO: 6 10E3/UL (ref 4–11)

## 2023-10-23 PROCEDURE — 258N000003 HC RX IP 258 OP 636: Performed by: HOSPITALIST

## 2023-10-23 PROCEDURE — 94660 CPAP INITIATION&MGMT: CPT

## 2023-10-23 PROCEDURE — 99223 1ST HOSP IP/OBS HIGH 75: CPT | Performed by: NURSE PRACTITIONER

## 2023-10-23 PROCEDURE — 99222 1ST HOSP IP/OBS MODERATE 55: CPT | Mod: FS | Performed by: PHYSICIAN ASSISTANT

## 2023-10-23 PROCEDURE — 80053 COMPREHEN METABOLIC PANEL: CPT

## 2023-10-23 PROCEDURE — 90935 HEMODIALYSIS ONE EVALUATION: CPT

## 2023-10-23 PROCEDURE — 4A023N6 MEASUREMENT OF CARDIAC SAMPLING AND PRESSURE, RIGHT HEART, PERCUTANEOUS APPROACH: ICD-10-PCS | Performed by: INTERNAL MEDICINE

## 2023-10-23 PROCEDURE — 90937 HEMODIALYSIS REPEATED EVAL: CPT

## 2023-10-23 PROCEDURE — 99255 IP/OBS CONSLTJ NEW/EST HI 80: CPT | Performed by: INTERNAL MEDICINE

## 2023-10-23 PROCEDURE — 250N000013 HC RX MED GY IP 250 OP 250 PS 637

## 2023-10-23 PROCEDURE — 84100 ASSAY OF PHOSPHORUS: CPT | Performed by: HOSPITALIST

## 2023-10-23 PROCEDURE — 634N000001 HC RX 634: Mod: JZ | Performed by: HOSPITALIST

## 2023-10-23 PROCEDURE — 5A1D70Z PERFORMANCE OF URINARY FILTRATION, INTERMITTENT, LESS THAN 6 HOURS PER DAY: ICD-10-PCS | Performed by: INTERNAL MEDICINE

## 2023-10-23 PROCEDURE — 250N000013 HC RX MED GY IP 250 OP 250 PS 637: Performed by: STUDENT IN AN ORGANIZED HEALTH CARE EDUCATION/TRAINING PROGRAM

## 2023-10-23 PROCEDURE — 250N000012 HC RX MED GY IP 250 OP 636 PS 637

## 2023-10-23 PROCEDURE — 250N000011 HC RX IP 250 OP 636

## 2023-10-23 PROCEDURE — 0W9D3ZZ DRAINAGE OF PERICARDIAL CAVITY, PERCUTANEOUS APPROACH: ICD-10-PCS | Performed by: INTERNAL MEDICINE

## 2023-10-23 PROCEDURE — 120N000003 HC R&B IMCU UMMC

## 2023-10-23 PROCEDURE — 80197 ASSAY OF TACROLIMUS: CPT | Performed by: STUDENT IN AN ORGANIZED HEALTH CARE EDUCATION/TRAINING PROGRAM

## 2023-10-23 PROCEDURE — 250N000013 HC RX MED GY IP 250 OP 250 PS 637: Performed by: HOSPITALIST

## 2023-10-23 PROCEDURE — 36415 COLL VENOUS BLD VENIPUNCTURE: CPT | Performed by: STUDENT IN AN ORGANIZED HEALTH CARE EDUCATION/TRAINING PROGRAM

## 2023-10-23 PROCEDURE — 87040 BLOOD CULTURE FOR BACTERIA: CPT | Performed by: STUDENT IN AN ORGANIZED HEALTH CARE EDUCATION/TRAINING PROGRAM

## 2023-10-23 PROCEDURE — 85025 COMPLETE CBC W/AUTO DIFF WBC: CPT

## 2023-10-23 PROCEDURE — 99223 1ST HOSP IP/OBS HIGH 75: CPT | Mod: GC | Performed by: STUDENT IN AN ORGANIZED HEALTH CARE EDUCATION/TRAINING PROGRAM

## 2023-10-23 PROCEDURE — 36415 COLL VENOUS BLD VENIPUNCTURE: CPT

## 2023-10-23 PROCEDURE — 999N000157 HC STATISTIC RCP TIME EA 10 MIN

## 2023-10-23 RX ORDER — MIDODRINE HYDROCHLORIDE 5 MG/1
10 TABLET ORAL
Status: DISCONTINUED | OUTPATIENT
Start: 2023-10-23 | End: 2023-10-23

## 2023-10-23 RX ORDER — GABAPENTIN 100 MG/1
100 CAPSULE ORAL DAILY
Status: DISCONTINUED | OUTPATIENT
Start: 2023-10-23 | End: 2023-10-23

## 2023-10-23 RX ORDER — MIDODRINE HYDROCHLORIDE 5 MG/1
10-20 TABLET ORAL 4 TIMES DAILY PRN
Status: DISCONTINUED | OUTPATIENT
Start: 2023-10-23 | End: 2023-10-24

## 2023-10-23 RX ORDER — NALOXONE HYDROCHLORIDE 0.4 MG/ML
0.4 INJECTION, SOLUTION INTRAMUSCULAR; INTRAVENOUS; SUBCUTANEOUS
Status: DISCONTINUED | OUTPATIENT
Start: 2023-10-23 | End: 2023-11-01 | Stop reason: HOSPADM

## 2023-10-23 RX ORDER — LACOSAMIDE 50 MG/1
50 TABLET ORAL EVERY MORNING
Status: DISCONTINUED | OUTPATIENT
Start: 2023-10-23 | End: 2023-11-01 | Stop reason: HOSPADM

## 2023-10-23 RX ORDER — GABAPENTIN 300 MG/1
300 CAPSULE ORAL AT BEDTIME
Status: DISCONTINUED | OUTPATIENT
Start: 2023-10-23 | End: 2023-11-01 | Stop reason: HOSPADM

## 2023-10-23 RX ORDER — ACETAMINOPHEN 325 MG/1
650 TABLET ORAL EVERY 8 HOURS PRN
Status: DISCONTINUED | OUTPATIENT
Start: 2023-10-23 | End: 2023-11-01 | Stop reason: HOSPADM

## 2023-10-23 RX ORDER — AMOXICILLIN 250 MG
1 CAPSULE ORAL 2 TIMES DAILY PRN
Status: DISCONTINUED | OUTPATIENT
Start: 2023-10-23 | End: 2023-11-01 | Stop reason: HOSPADM

## 2023-10-23 RX ORDER — TACROLIMUS 0.5 MG/1
0.5 CAPSULE ORAL EVERY 12 HOURS
Status: DISCONTINUED | OUTPATIENT
Start: 2023-10-23 | End: 2023-10-24

## 2023-10-23 RX ORDER — NALOXONE HYDROCHLORIDE 0.4 MG/ML
0.2 INJECTION, SOLUTION INTRAMUSCULAR; INTRAVENOUS; SUBCUTANEOUS
Status: DISCONTINUED | OUTPATIENT
Start: 2023-10-23 | End: 2023-11-01 | Stop reason: HOSPADM

## 2023-10-23 RX ORDER — CEFEPIME HYDROCHLORIDE 1 G/1
1 INJECTION, POWDER, FOR SOLUTION INTRAMUSCULAR; INTRAVENOUS EVERY 24 HOURS
Status: DISCONTINUED | OUTPATIENT
Start: 2023-10-23 | End: 2023-10-23

## 2023-10-23 RX ORDER — PANTOPRAZOLE SODIUM 40 MG/1
40 TABLET, DELAYED RELEASE ORAL
Status: DISCONTINUED | OUTPATIENT
Start: 2023-10-24 | End: 2023-11-01 | Stop reason: HOSPADM

## 2023-10-23 RX ORDER — ACETAMINOPHEN 325 MG/1
650 TABLET ORAL EVERY 6 HOURS PRN
Status: DISCONTINUED | OUTPATIENT
Start: 2023-10-23 | End: 2023-10-23

## 2023-10-23 RX ORDER — LACOSAMIDE 50 MG/1
100 TABLET ORAL EVERY EVENING
Status: DISCONTINUED | OUTPATIENT
Start: 2023-10-23 | End: 2023-11-01 | Stop reason: HOSPADM

## 2023-10-23 RX ORDER — OXYCODONE HYDROCHLORIDE 5 MG/1
5 TABLET ORAL EVERY 6 HOURS PRN
Status: DISCONTINUED | OUTPATIENT
Start: 2023-10-23 | End: 2023-10-29

## 2023-10-23 RX ORDER — POLYETHYLENE GLYCOL 3350 17 G/17G
17 POWDER, FOR SOLUTION ORAL DAILY PRN
Status: DISCONTINUED | OUTPATIENT
Start: 2023-10-23 | End: 2023-11-01 | Stop reason: HOSPADM

## 2023-10-23 RX ORDER — TACROLIMUS 1 MG/1
1 CAPSULE ORAL EVERY 12 HOURS
Status: DISCONTINUED | OUTPATIENT
Start: 2023-10-23 | End: 2023-10-23

## 2023-10-23 RX ORDER — SEVELAMER CARBONATE 800 MG/1
800 TABLET, FILM COATED ORAL
Status: DISCONTINUED | OUTPATIENT
Start: 2023-10-23 | End: 2023-11-01 | Stop reason: HOSPADM

## 2023-10-23 RX ORDER — SEVELAMER CARBONATE 800 MG/1
800 TABLET, FILM COATED ORAL 4 TIMES DAILY
Status: DISCONTINUED | OUTPATIENT
Start: 2023-10-23 | End: 2023-10-23

## 2023-10-23 RX ORDER — CEFEPIME HYDROCHLORIDE 1 G/1
1 INJECTION, POWDER, FOR SOLUTION INTRAMUSCULAR; INTRAVENOUS EVERY 24 HOURS
Status: COMPLETED | OUTPATIENT
Start: 2023-10-23 | End: 2023-10-24

## 2023-10-23 RX ORDER — LANOLIN ALCOHOL/MO/W.PET/CERES
3 CREAM (GRAM) TOPICAL
Status: DISCONTINUED | OUTPATIENT
Start: 2023-10-23 | End: 2023-11-01 | Stop reason: HOSPADM

## 2023-10-23 RX ORDER — ROPINIROLE 0.5 MG/1
0.5 TABLET, FILM COATED ORAL 2 TIMES DAILY PRN
Status: DISCONTINUED | OUTPATIENT
Start: 2023-10-23 | End: 2023-11-01 | Stop reason: HOSPADM

## 2023-10-23 RX ORDER — AMOXICILLIN 250 MG
2 CAPSULE ORAL 2 TIMES DAILY PRN
Status: DISCONTINUED | OUTPATIENT
Start: 2023-10-23 | End: 2023-11-01 | Stop reason: HOSPADM

## 2023-10-23 RX ORDER — B COMPLEX, C NO.20/FOLIC ACID 1 MG
1 CAPSULE ORAL DAILY
Status: DISCONTINUED | OUTPATIENT
Start: 2023-10-23 | End: 2023-11-01 | Stop reason: HOSPADM

## 2023-10-23 RX ORDER — ACETAMINOPHEN 650 MG/1
650 SUPPOSITORY RECTAL EVERY 6 HOURS PRN
Status: DISCONTINUED | OUTPATIENT
Start: 2023-10-23 | End: 2023-10-23

## 2023-10-23 RX ORDER — HYDROMORPHONE HYDROCHLORIDE 2 MG/1
2 TABLET ORAL EVERY 6 HOURS PRN
Status: DISCONTINUED | OUTPATIENT
Start: 2023-10-23 | End: 2023-10-27

## 2023-10-23 RX ADMIN — LACOSAMIDE 50 MG: 50 TABLET, FILM COATED ORAL at 10:12

## 2023-10-23 RX ADMIN — SODIUM CHLORIDE 300 ML: 9 INJECTION, SOLUTION INTRAVENOUS at 13:42

## 2023-10-23 RX ADMIN — EPOETIN ALFA-EPBX 4000 UNITS: 10000 INJECTION, SOLUTION INTRAVENOUS; SUBCUTANEOUS at 13:43

## 2023-10-23 RX ADMIN — Medication 1 CAPSULE: at 07:59

## 2023-10-23 RX ADMIN — SEVELAMER CARBONATE 800 MG: 800 TABLET, FILM COATED ORAL at 17:35

## 2023-10-23 RX ADMIN — Medication: at 13:43

## 2023-10-23 RX ADMIN — APIXABAN 5 MG: 5 TABLET, FILM COATED ORAL at 19:59

## 2023-10-23 RX ADMIN — OXYCODONE HYDROCHLORIDE 5 MG: 5 TABLET ORAL at 12:18

## 2023-10-23 RX ADMIN — GABAPENTIN 100 MG: 100 CAPSULE ORAL at 07:59

## 2023-10-23 RX ADMIN — MIDODRINE HYDROCHLORIDE 10 MG: 5 TABLET ORAL at 11:52

## 2023-10-23 RX ADMIN — SEVELAMER CARBONATE 800 MG: 800 TABLET, FILM COATED ORAL at 11:52

## 2023-10-23 RX ADMIN — SEVELAMER CARBONATE 800 MG: 800 TABLET, FILM COATED ORAL at 07:59

## 2023-10-23 RX ADMIN — CEFEPIME HYDROCHLORIDE 1 G: 1 INJECTION, POWDER, FOR SOLUTION INTRAMUSCULAR; INTRAVENOUS at 18:49

## 2023-10-23 RX ADMIN — LACOSAMIDE 100 MG: 50 TABLET, FILM COATED ORAL at 19:58

## 2023-10-23 RX ADMIN — APIXABAN 5 MG: 5 TABLET, FILM COATED ORAL at 07:59

## 2023-10-23 RX ADMIN — SODIUM CHLORIDE 250 ML: 9 INJECTION, SOLUTION INTRAVENOUS at 13:42

## 2023-10-23 RX ADMIN — TACROLIMUS 0.5 MG: 0.5 CAPSULE ORAL at 17:35

## 2023-10-23 RX ADMIN — MIDODRINE HYDROCHLORIDE 20 MG: 5 TABLET ORAL at 14:21

## 2023-10-23 RX ADMIN — OXYCODONE HYDROCHLORIDE 5 MG: 5 TABLET ORAL at 21:01

## 2023-10-23 RX ADMIN — Medication 3 MG: at 03:18

## 2023-10-23 RX ADMIN — OXYCODONE HYDROCHLORIDE 5 MG: 5 TABLET ORAL at 06:26

## 2023-10-23 RX ADMIN — GABAPENTIN 300 MG: 300 CAPSULE ORAL at 21:01

## 2023-10-23 ASSESSMENT — ACTIVITIES OF DAILY LIVING (ADL)
WALKING_OR_CLIMBING_STAIRS_DIFFICULTY: NO
ADLS_ACUITY_SCORE: 22
ADLS_ACUITY_SCORE: 26
TOILETING_ISSUES: NO
ADLS_ACUITY_SCORE: 22
DEPENDENT_IADLS:: INDEPENDENT
ADLS_ACUITY_SCORE: 22
DIFFICULTY_EATING/SWALLOWING: NO
ADLS_ACUITY_SCORE: 22
DRESSING/BATHING_DIFFICULTY: NO
ADLS_ACUITY_SCORE: 22
CONCENTRATING,_REMEMBERING_OR_MAKING_DECISIONS_DIFFICULTY: NO
VISION_MANAGEMENT: GLASSES
CHANGE_IN_FUNCTIONAL_STATUS_SINCE_ONSET_OF_CURRENT_ILLNESS/INJURY: NO
DOING_ERRANDS_INDEPENDENTLY_DIFFICULTY: NO
FALL_HISTORY_WITHIN_LAST_SIX_MONTHS: NO
ADLS_ACUITY_SCORE: 26
ADLS_ACUITY_SCORE: 35
ADLS_ACUITY_SCORE: 22
WEAR_GLASSES_OR_BLIND: YES
ADLS_ACUITY_SCORE: 22
ADLS_ACUITY_SCORE: 26
ADLS_ACUITY_SCORE: 26

## 2023-10-23 ASSESSMENT — COLUMBIA-SUICIDE SEVERITY RATING SCALE - C-SSRS
2. HAVE YOU ACTUALLY HAD ANY THOUGHTS OF KILLING YOURSELF IN THE PAST MONTH?: NO
6. HAVE YOU EVER DONE ANYTHING, STARTED TO DO ANYTHING, OR PREPARED TO DO ANYTHING TO END YOUR LIFE?: NO
1. IN THE PAST MONTH, HAVE YOU WISHED YOU WERE DEAD OR WISHED YOU COULD GO TO SLEEP AND NOT WAKE UP?: NO

## 2023-10-23 NOTE — PROGRESS NOTES
Alert and oriented x 4. Independent in room. Oxycodone 2.5 mg given for pain and atarax for itching. VSS, RA. Abdomen rounded and distended. Paracentesis done 10/19 waiting for culture result. For transfer to [facility]. Given report to receiving RN at [facility]-. Patient aware.

## 2023-10-23 NOTE — CONSULTS
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 s/p liver transplant  in 2016 now with progressive cirrhosis (liver bx 12/2022) with evidence of portal hypertension and ascites, paroxysmal atrial fibrillation on Eliquis, DM type 2, PEA arrest 12/2022, history of CVA, HTN, CHRISTIAN on BiPAP, ESRD on dialysis who presented to [facility] ED on 10/5/23 with chest discomfort and palpitations, admitted with severe sepsis with septic shock requiring pressors secondary to multifactorial pneumonia, atrial fibrillation with aberrancy s/p 2 failed attempts at cardioversion, worsening pericardial effusion on ECHO s/p pericardiocentesis 10/11, and PE started on IV heparin. 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 Has required multiple taps with subsequent chylous ascites findings despite tailored diet.  Transferred to [facility] to be seen by liver transplant team.    # ESKD: HD dependent ~ 1yr. Dialyzes MWF at [facility] SLP with Dr. Bradshaw. EDW 85.5 kg. Access: tunneled RIJ (has recently revised L AVF, unit has used twice using 17 g)  - Plan MWF dialysis  - plan to use CVC today, if admission is prolonged, will start using AVF    # Volume: EDW 85.5 kg (though may be lower at this point); newly developing chylous ascites; had RHC 10/11 at wt of 87.7 kg, normal R and L filling pressures, mild pulm HTN, L pericardial effusion; echo 10/17 with hyperkinetic EF > 70 and no pericardial effusion s/p drain  # Chronic hypotension  - requires midodrine before HD and often again during  - usual UF 2 to 2.5 kg  - fluid restrict as above  - may have paracentesis in near future    # Hypervolemic  hyponatremia: 121  - ordered 1L strict fluid restriction (discussed with pt) and he is on 2g Na diet    # BMD: Ca 8's, alb 3.2  - rechecking phos  - reduced sevelamer to tid (not qid)    # Anemia of CKD:  - continue epo 8K per run    # s/p liver transplant: now with cirrhosis, portal HTN, chylous ascites      Recommendations were communicated to primary team via this note    Seen and discussed with Dr. 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 s/p liver transplant  in 2016 now with progressive cirrhosis (liver bx 12/2022) with evidence of portal hypertension and ascites, paroxysmal atrial fibrillation on Eliquis, DM type 2, hx of PEA arrest 12/2022, history of CVA, HTN, CHRISTIAN on BiPAP, ESRD on dialysis who presented to [facility] ED on 10/5/23 with chest discomfort and palpitations, admitted with severe sepsis with septic shock requiring pressors secondary to multifactorial pneumonia, atrial fibrillation with aberrancy s/p 2 failed attempts at cardioversion, worsening pericardial effusion on ECHO s/p pericardiocentesis 10/11, and PE started on IV heparin. 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 Has required multiple taps with subsequent chylous ascites findings despite tailored diet.  Transferred to [facility] to be seen by liver transplant team.    Pt has been on HD for about 1 yr. He has chronic hypotension that at times limits his UF. Today is Na is 121, we ordered a 1L fluid restriction and he's already on low salt diet. He reports doing fairly well until 6 or so weeks ago when he developed hypotension and was found to have afib as well as pericardial effusion. He also recently started developing ascites in setting of worsening cirrhosis and portal HTN.     He is  seen on dialysis today, BP's soft but being maintained and improving with midodrine. 2 kg UF goal. No n/v, CP, SOB, chills    PAST MEDICAL HISTORY:  Reviewed with patient on 10/23/2023     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    Cancer (H) 2023    Squamous Cell Cancer- Since resolved    Cirrhosis of liver with ascites (H) 02/11/2016    Coagulopathy (H24)     Critical illness myopathy     Dialysis patient (H24)     DIALYSIS 3X/ WEEK    Encephalopathy     ESRD (end stage renal disease) on dialysis (H)     Gastroesophageal reflux disease     H/O alcohol abuse     History of blood transfusion     Hyperammonemia (H24)     Hyperlipidemia     Hypertension     Patients states that HTN has been resolved    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    Insomnia     Lactic acidosis 11/12/2022    Liver replaced by transplant (H) 09/20/2016    NAFLD (nonalcoholic fatty liver disease) 02/11/2016    CHRISTIAN on CPAP     Parainfluenza type 1 infection 11/19/2022    Parapneumonic effusion     Pleural effusion     Pneumothorax on left 12/16/2022    Respiratory acidosis 11/12/2022    Respiratory arrest (H) 11/12/2022    Restless legs syndrome (RLS)     SBP (spontaneous bacterial peritonitis) (H)     MNGI    Seizures (H) 12/2022    Sepsis due to Streptococcus pneumoniae with acute hypoxic respiratory failure (H) 11/19/2022    Stroke, embolic (H) 11/20/2022    Sudden cardiac arrest (H) 12/29/2022    PEA- 2 min of CPR    Tubular adenoma 10/2019    Large cecal adenoma- due for surveillance colonoscopy in 3 years (10/2022)       Past Surgical History:   Procedure Laterality Date    APPENDECTOMY      BENCH LIVER N/A 09/20/2016    Procedure: BENCH LIVER;  Surgeon: Enoc Crews  MD;  Location: [facility] OR    BRONCHOSCOPY FLEXIBLE AND RIGID N/A 2022    Procedure: BRONCHOSCOPY;  Surgeon: Alena Valenzuela MD;  Location:  GI    COLONOSCOPY  2013    repeat in 2018    COLONOSCOPY N/A 10/04/2019    Procedure: COLONOSCOPY, WITH POLYPECTOMY AND BIOPSY;  Surgeon: Go Chong MD;  Location: [facility] OR    CREATE FISTULA ARTERIOVENOUS UPPER EXTREMITY Left 2023    Procedure: LEFT UPPER EXTREMITY ARTERIOVENOUS FISTULA CREATION. LIGATION OF COMPETING VASCULAR BRANCHES- LEFT;  Surgeon: Deejay Mcneil MD;  Location:  OR    CV PERICARDIOCENTESIS N/A 2023    Procedure: Pericardiocentesis;  Surgeon: Barry Mckeon MD;  Location:  HEART CARDIAC CATH LAB    CV PERICARDIOCENTESIS N/A 10/11/2023    Procedure: Pericardiocentesis;  Surgeon: Ulises Bhakta MD;  Location:  HEART CARDIAC CATH LAB    CV RIGHT HEART CATH MEASUREMENTS RECORDED N/A 10/11/2023    Procedure: Right Heart Catheterization;  Surgeon: Ulises Bhakta MD;  Location:  HEART CARDIAC CATH LAB    HERNIA REPAIR      IR CHEST TUBE PLACEMENT NON-TUNNELED RIGHT  2022    IR CVC TUNNEL PLACEMENT > 5 YRS OF AGE  2022    IR DIALYSIS FISTULOGRAM LEFT  2023    IR GASTROSTOMY TUBE CHANGE  2023    IR GASTROSTOMY TUBE PERCUTANEOUS PLCMNT  2022    IR PARACENTESIS  2022    IR PARACENTESIS  2022    IR THORACENTESIS  2022    IR THORACENTESIS  2020    IR THORACENTESIS  08/10/2020    IR TRANSCATHETER BIOPSY  2022    REVISION FISTULA ARTERIOVENOUS UPPER EXTREMITY Left 8/15/2023    Procedure: 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 2; OUTFLOW REVISION CEPHALIC TO BRACHIAL VEIN;  Surgeon: Deejay Mcneil MD;  Location:  OR    TRACHEOSTOMY N/A 2022    Procedure: TRACHEOSTOMY;  Surgeon: Nilesh Jackson MD;  Location:  OR    TRANSPLANT LIVER RECIPIENT  DONOR N/A 2016    Procedure: TRANSPLANT LIVER RECIPIENT   DONOR;  Surgeon: Enoc Crews MD;  Location: [facility] OR        MEDICATIONS:  PTA Meds  Prior to Admission medications    Medication Sig Last Dose Taking? Auth Provider Long Term End Date   acetaminophen (TYLENOL) 325 MG tablet Take 2 tablets (650 mg) by mouth every 4 hours as needed for other (For optimal non-opioid multimodal pain management to improve pain control.) Past Month Yes Deejay Mcneil MD No    apixaban ANTICOAGULANT (ELIQUIS) 5 MG tablet Take 1 tablet (5 mg) by mouth 2 times daily for 90 days 10/22/2023 Yes Ki Carter PA-C No 23   gabapentin (NEURONTIN) 100 MG capsule Take 1 capsule (100 mg) by mouth daily 10/22/2023 Yes Ki Carter PA-C Yes    hydrOXYzine (ATARAX) 25 MG tablet Take 1 tablet (25 mg) by mouth 3 times daily as needed for itching 10/22/2023 Yes Ki Carter PA-C     Lacosamide (VIMPAT) 100 MG TABS tablet Take 1 tablet (100 mg) by mouth every evening 10/22/2023 Yes Reba Laughlin MD Yes    lacosamide (VIMPAT) 50 MG TABS tablet Take 50 mg by mouth every morning 10/22/2023 Yes Unknown, Entered By History Yes    melatonin 3 MG tablet Take 1 tablet (3 mg) by mouth At Bedtime 10/22/2023 Yes Ki Carter PA-C     midodrine (PROAMATINE) 10 MG tablet Take 10 mg by mouth 3 times daily 10/22/2023 Yes Unknown, Entered By History No    midodrine (PROAMATINE) 10 MG tablet Take 10-20 mg by mouth 4 times daily as needed (On dialysis days) On dialysis days - patient takes 1 tablet one hour before dialysis, 1 tablet when he gets there, 1 tablet during dialysis and 1-2 tablets after dialysis. Max 5 tablets 10/22/2023 Yes Unknown, Entered By History No    multivitamin RENAL (TRIPHROCAPS) 1 capsule capsule Take 1 capsule by mouth daily 10/22/2023 Yes Ki Carter PA-C     oxyCODONE (ROXICODONE) 5 MG tablet Take 1 tablet (5 mg) by mouth every 4 hours as needed for moderate pain 10/22/2023 Yes Deejay Mcneil MD No     rOPINIRole (REQUIP) 0.5 MG tablet Take 0.5 mg by mouth 2 times daily as needed (restless legs) Past Month Yes Unknown, Entered By History No    sevelamer carbonate (RENVELA) 800 MG tablet Take 800 mg by mouth 4 times daily With meals and snacks 10/22/2023 Yes Reported, Patient     tacrolimus (GENERIC EQUIVALENT) 1 MG capsule Take 1 capsule (1 mg) by mouth every 12 hours 10/22/2023 Yes iK Carter PA-C No       Current Meds   apixaban ANTICOAGULANT  5 mg Oral BID    ceFEPIme  1 g Intravenous Q24H    gabapentin  100 mg Oral Daily    Lacosamide  100 mg Oral QPM    lacosamide  50 mg Oral QAM    multivitamin RENAL  1 capsule Oral Daily    sevelamer carbonate  800 mg Oral TID w/meals    sodium chloride (PF)  9 mL Intracatheter During Dialysis/CRRT (from stock)    sodium chloride (PF)  9 mL Intracatheter During Dialysis/CRRT (from stock)    tacrolimus  0.5 mg Oral Q12H     Infusion Meds      ALLERGIES:    No Known Allergies    REVIEW OF SYSTEMS:  A comprehensive of systems was negative except as noted above.    SOCIAL HISTORY:   Social History     Socioeconomic History    Marital status:      Spouse name: Not on file    Number of children: Not on file    Years of education: Not on file    Highest education level: Not on file   Occupational History    Not on file   Tobacco Use    Smoking status: Never    Smokeless tobacco: Never   Vaping Use    Vaping Use: Never used   Substance and Sexual Activity    Alcohol use: Not Currently     Alcohol/week: 0.0 standard drinks of alcohol    Drug use: No    Sexual activity: Not Currently   Other Topics Concern    Parent/sibling w/ CABG, MI or angioplasty before 65F 55M? Not Asked   Social History Narrative    Not on file     Social Determinants of Health     Financial Resource Strain: Not on file   Food Insecurity: Not on file   Transportation Needs: Not on file   Physical Activity: Not on file   Stress: Not on file   Social Connections: Not on file   Interpersonal  Safety: Low Risk  (2023)    Interpersonal Safety     Do you feel physically and emotionally safe where you currently live?: Yes     Within the past 12 months, have you been hit, slapped, kicked or otherwise physically hurt by someone?: No     Within the past 12 months, have you been humiliated or emotionally abused in other ways by your partner or ex-partner?: No   Housing Stability: Not on file     Reviewed with patient         FAMILY MEDICAL HISTORY:   Family History   Problem Relation Age of Onset    Coronary Artery Disease No family hx of     Cardiomyopathy No family hx of      No known Family history of kidney disease  Reviewed with patient      PHYSICAL EXAM:   Temp  Av.2  F (36.8  C)  Min: 96.2  F (35.7  C)  Max: 99.5  F (37.5  C)  Arterial Line BP  Min: -2/-3  Max: 146/53  Arterial Line MAP (mmHg)  Av.4 mmHg  Min: 54 mmHg  Max: 98 mmHg      Pulse  Av.3  Min: 43  Max: 156 Resp  Av.9  Min: 4  Max: 105  FiO2 (%)  Av %  Min: 30 %  Max: 30 %  SpO2  Av.9 %  Min: 48 %  Max: 100 %       BP 91/61   Pulse 81   Temp 98.6  F (37  C)   Resp 18   Wt 86.6 kg (190 lb 14.7 oz)   SpO2 96%   BMI 25.89 kg/m     Date 10/23/23 0700 - 10/24/23 0659   Shift 8212-8585 8495-3164 0078-6355 24 Hour Total   INTAKE   P.O. 237   237   Shift Total(mL/kg) 237(2.74)   237(2.74)   OUTPUT   Shift Total(mL/kg)       Weight (kg) 86.6 86.6 86.6 86.6      Admit Weight: 86.6 kg (190 lb 14.7 oz)     GENERAL APPEARANCE: alert, NAD  EYES: no scleral icterus, pupils equal  Pulmonary: no increased WOB  CV: RRR   - Edema no peripheral  GI: soft, distended  MS: no evidence of inflammation in joints, no muscle tenderness  : no gomez  SKIN: no rash, warm, dry, no cyanosis  NEURO: face symmetric, a/o3  Access: CVC (newly revised L AVF)    LABS:   I have reviewed the following labs:  CMP  Recent Labs   Lab 10/23/23  0654 10/22/23  0959 10/21/23  0748 10/20/23  1949   * 125* 127* 128*   POTASSIUM 5.5* 4.7 4.2  4.3   CHLORIDE 87* 91* 91* 94*   CO2 19* 23 24 25   ANIONGAP 15 11 12 9   * 126* 116* 141*   BUN 87.9* 74.8* 48.6* 39.9*   CR 8.03* 6.90* 5.43* 4.37*   GFRESTIMATED 7* 9* 11* 15*   KELLY 8.8 8.4* 8.7 8.4*   PROTTOTAL 5.9* 5.7* 6.4 6.4   ALBUMIN 3.2* 2.8* 3.2* 3.0*   BILITOTAL 0.4 0.3 0.4 0.4   ALKPHOS 177* 164* 182* 185*   AST 17 13 13 13   ALT <5 6 7 7     CBC  Recent Labs   Lab 10/23/23  0654 10/22/23  1655 10/21/23  0748 10/20/23  1050   HGB 9.0* 8.3* 9.1* 9.1*   WBC 6.0 6.2 4.6 4.4   RBC 2.83* 2.64* 2.90* 2.84*   HCT 28.0* 26.0* 29.6* 28.2*   MCV 99 99 102* 99   MCH 31.8 31.4 31.4 32.0   MCHC 32.1 31.9 30.7* 32.3   RDW 17.2* 17.0* 17.7* 17.3*    175 169 157     INR  Recent Labs   Lab 10/17/23  0633   INR 2.00*     ABGNo lab results found in last 7 days.   URINE STUDIES  Recent Labs   Lab Test 11/25/22  1859 10/31/17  1038 09/24/16  1055 09/23/16  1458 03/01/16  0644   COLOR Yellow [place] Yellow Red Duplicate request  SEE [id number]    Yellow   APPEARANCE Slightly Cloudy* Slightly Cloudy Slightly Cloudy Cloudy Duplicate request  SEE [id number]    Clear   URINEGLC Negative Negative Negative Negative Duplicate request  SEE [id number]  *  Negative   URINEBILI Negative Negative Negative Moderate  This is an unconfirmed screening test result. A positive result may be false.  * Duplicate request  SEE [id number]  *  Negative   URINEKETONE Negative 5* Negative Negative Duplicate request  SEE [id number]  *  Negative   SG 1.017 1.021 1.011 1.032 Duplicate request  SEE [id number]    1.029   UBLD Moderate* Negative Moderate* Large* Duplicate request  SEE [id number]  *  Negative   URINEPH 5.5 5.0 5.0 7.5* Duplicate request  SEE [id number]    5.0   PROTEIN 70* Negative Negative 300* Duplicate request  SEE [id number]  *  Negative   NITRITE Negative Negative Negative Negative Duplicate request  SEE [id number]  *  Negative   LEUKEST Small* Negative Moderate* Large* Duplicate request  SEE [id number]  *  Negative   RBCU  --  <1 38* >182* 1*   WBCU  --   1 13* 103* 2*     Recent Labs   Lab Test 10/31/17  1038 03/01/16  0644   UTPG 0.09 0.02     PTH  Recent Labs   Lab Test 03/12/23  0756 03/07/23  1004 02/17/23  0808 01/23/23  0638 09/24/16  0637   PTHI 7* 7* 9* 11* 230*     IRON STUDIES  Recent Labs   Lab Test 04/14/23  0725 04/06/23  0808 03/25/23  1058 03/12/23  0756 01/30/23  0512 12/30/22  0651 12/26/22  0435 12/13/22  0427 03/01/16  0648   IRON 60* 78  --  37* 29* 23* 28* 37 206*   *  --   --  142* 96* 104* 114* 138* 226*   IRONSAT 29  --   --  26 30 22 25 27 91*   HOSSEIN 286  --  184 252 502* 426* 252 212  --        IMAGING:  Reviewed    JEANCARLOS Fowler

## 2023-10-23 NOTE — SIGNIFICANT EVENT
Significant Event Note    Time of event: 10:01 PM October 22, 2023    Description of event:  Patient has a bed available at [facility].    Plan:  Discussed with Dr. Luu at [facility] who accepted the patient.       Julio C Fonseca MD

## 2023-10-23 NOTE — PLAN OF CARE
Goal Outcome Evaluation:      Plan of Care Reviewed With: patient    Overall Patient Progress: no changeOverall Patient Progress: no change    Outcome Evaluation: See RD note 10/23    LEONCIO Bae, RD, LD  6B/Obs RD pager [phone]  Weekend/Holiday RD pager [phone]

## 2023-10-23 NOTE — CONSULTS
Care Management Initial Consult    General Information  Assessment completed with: Patient,    Type of CM/SW Visit: Initial Assessment    Primary Care Provider verified and updated as needed: Yes   Readmission within the last 30 days:        Reason for Consult: discharge planning  Advance Care Planning: Advance Care Planning Reviewed: no concerns identified          Communication Assessment  Patient's communication style: spoken language (English or Bilingual)    Hearing Difficulty or Deaf: no   Wear Glasses or Blind: yes    Cognitive  Cognitive/Neuro/Behavioral: WDL  Level of Consciousness: alert  Arousal Level: opens eyes spontaneously  Orientation: oriented x 4  Mood/Behavior: calm, cooperative  Best Language: 0 - No aphasia  Speech: clear    Living Environment:   People in home: spouse  WifeOfe  Current living Arrangements: Condominium   Able to return to prior arrangements: yes       Family/Social Support:  Care provided by: self, spouse/significant other  Provides care for:    Marital Status:   Wife, Sibling(s)          Description of Support System: Supportive, Involved    Support Assessment: Adequate family and caregiver support    Current Resources:   Patient receiving home care services: No  Community Resources: Outpatient Dialysis  Equipment currently used at home: none  Supplies currently used at home: None    Employment/Financial:  Employment Status: Self employed- , works as able.   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 Patient was admitted to [facility] on 10/5 for septic shock and afib. Pt transferred to [facility] on 10/18 for further workup with transplant ID and hepatology evaluation.    This writer called pt to complete initial case management assessment as pt was down in dialysis. Pt lives with his wife in a condo. Pt states he is independent with ADLs/iADLs at baseline. Pt confirmed his outpatient dialysis is at [facility] (M/W/F). Pt states he drives himself to dialysis and follow up appointments. Pt stated that his wife and brother are able to assist as needed.     PT/OT consults currently pending. ID and GI consults also pending.     [facility] (M/W/F)  Phone: [phone]  Fax: [phone]    CC will continue to monitor patient's medical condition and progress towards discharge.  Lavonne Nuno RN BSN  6C Unit Care Coordinator  Phone number: [phone]  Pager: [phone]

## 2023-10-23 NOTE — CONSULTS
SOLID ORGAN INFECTIOUS DISEASES CONSULTATION     Patient:  Blanco Osborne   YOB: 1965  Date of Visit:  10/23/2023  Date of Admission: 10/23/2023  Consult Requester:Jimbo Luu,*          Assessment and Recommendations:   Recommendations:  Continue Cefepime 1g q24hrs for continued peritonitis coverage.   Please order an ultrasound of L arm fistula to assess for fluid collection/infection since patient complaining of ongoing pain and redness.   Please collect blood culture from AV fistula (per dialysis) and 2 peripheral BCx today.   With next paracentesis, please send diagnostic work up including cell count with differential, aerobic, anaerobic, and fungal culture.     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 Admitted with septic shock 2/2 multifocal pneumonia requiring pressors c/b A fib with aberrancy s/p 2 attempts at cardioversion which failed, worsening pericardial effusion s/p pericardiocentesis 10/11, and PE on heparin. 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 ID consulted for concern of peritonitis.     Concern for SB 10/16 paracentesis.   Chylous ascites.   Developed increasing abdominal distension prior to admission. Has needed multiple paracenteses wince arrival. 10/6 1119 WBC/ 1% neuts and trigs >400, 10/10 761 WBC/ 4% neuts, 10/12 719/1% neuts, 10/16 3844 WBC/83% neuts, aerobic culture negative, fungal culture NGTD, AFB Cx NGTD/stain negative, 10/19 without diagnostics. GI consulted and suspect recurrent ascites related to portal HTN in setting of cirrhosis.  Attempting diet change to improve chylous ascites. ID was consulted at [facility] and aiding in work up.   -On Cefepime since 10/17    Pericardial effusion   S/p pericardiocentesis 10/11 with 2558 WBC/62% neutrophils, cultures negative. Pericardial drain removed 10/12. Quant gold negative, HIV screen negative, EBV DNA PCR <500/log <2.7,  CMV DNA PCR not detected.    Multifocal pneumonia.   Septic shock- resolved.   Hypotensive requiring pressor support and ICU admission 10/5. Did have A fib with 2 failed cardioversions, initiated on amiodarone with improvement to NSR. CT chest with PE and pulmonary infiltrates c/f pneumonia and is now s/p broad spectrum abx as above. No sputum collected or organism identified. Was negative for COVID/influenza/RSV on PCR. Completed course of Zosyn 10/5-12 + 2 days of Augmentin.    H/o Liver transplant (2016).   ESRD on HD, transplant listed.  On tacrolimus for liver transplant.     Previous ID Issues:  -H/o pneumococcal bacteremia & parainfluenza pneumonia 11/2022 hospitalization  -H/o Candida parapsilosis empyema (L pleural fluid) s/p 4 wks of fluconazole (completed 1/23/23) and lactobacillus SBP during 12/2022 hospitalization  -HHV-6 + CSF 12/2022, given IV GCV x5 days  -HSV+ lip swab 11/2022     Other ID issues:  - QTc interval:  440 msec 10/14/23  - Pneumocystis prophylaxis:  none  - Viral serostatus & prophylaxis:  CMV D+/R-, EBV D+/R+  - Immunization status:  COVID vaccinated x2, influenza completed for 2023, due for COVID booster, shingrix #2 11/16/2  - Isolation status: good hand hygiene     Thank you for the consult. Transplant ID will continue to follow with you.     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 Admitted with septic shock 2/2 multifocal pneumonia requiring pressors c/b A fib with aberrancy s/p 2 attempts at cardioversion which failed, worsening pericardial effusion s/p pericardiocentesis 10/11, and PE on heparin. 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 Has had many repeat paracentesis now with concern for SBP on para 10/16 with rise in WBC count and elevated neutrophils, no culture growth to date. On Cefepime for this.     Transferred to [facility] for further care. On ID evaluation, he complains of ongoing L arm fistula pain and mild redness, ongoing but improved headache, and intermittent but ongoing abdominal pain and distension. Denies cough or sputum production, no vision changes or numbness/tingling, no diarrhea or vomiting. Endorses abdominal pain that is worse with distension and improved post paracentesis. Denies peritoneal symptoms. Regarding LUE fistula, he noted some overlying erythema but this has improved with ice/heat packs. Has ongoing pain overlying it.     Has been hospitalized since 10/5. He is from [place], [place]. No recent travel prior to hospitalization. Has visited the [facility] part of the , but a few yrs ago since that visit. International travel to [place] in past.            Antimicrobials:    Cefepime 10/17-current     Past  Zosyn 10/5-12  Augmentin 10/12-13   Vancomycin 10/5,  Ceftriaxone 10/16       Review of Systems:     CONSTITUTIONAL:  No fevers, chills, or sweats.  EYES: negative for icterus, conjunctival injection or drainage.  ENT:  negative for nasal congestion, rhinorrhea, or sore throat.   RESPIRATORY:  negative for cough, sputum production, or SOB.  CARDIOVASCULAR:  negative for chest pain or dyspnea.   GASTROINTESTINAL:  negative for  nausea, vomiting, diarrhea and constipation. +abdominal pain & distension.  GENITOURINARY:  negative for dysuria, frequency or urgency.   HEME:  No easy bruising.  INTEGUMENT:  negative for rash and pruritus.  NEURO:  Negative for vision changes, or weakness. + headache.  MSK: + L AV fistula pain.         Past Medical History:     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    Cancer (H) 2023    Squamous Cell Cancer- Since resolved    Cirrhosis of liver with ascites (H) 02/11/2016    Coagulopathy (H24)     Critical illness myopathy     Dialysis patient (H24)     DIALYSIS 3X/ WEEK    Encephalopathy     ESRD (end stage renal disease) on dialysis (H)     Gastroesophageal reflux disease     H/O alcohol abuse     History of blood transfusion     Hyperammonemia (H24)     Hyperlipidemia     Hypertension     Patients states that HTN has been resolved    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    Insomnia     Lactic acidosis 11/12/2022    Liver replaced by transplant (H) 09/20/2016    NAFLD (nonalcoholic fatty liver disease) 02/11/2016    CHRISTIAN on CPAP     Parainfluenza type 1 infection 11/19/2022    Parapneumonic effusion     Pleural effusion     Pneumothorax on left 12/16/2022    Respiratory acidosis 11/12/2022    Respiratory arrest (H) 11/12/2022    Restless legs syndrome (RLS)     SBP (spontaneous bacterial peritonitis) (H)     MNGI    Seizures (H) 12/2022    Sepsis due to Streptococcus pneumoniae with acute hypoxic respiratory failure (H) 11/19/2022    Stroke, embolic (H) 11/20/2022    Sudden cardiac arrest (H) 12/29/2022    PEA- 2 min of CPR    Tubular adenoma 10/2019    Large cecal adenoma- due for surveillance colonoscopy in 3 years (10/2022)            Past  Surgical History:     Past Surgical History:   Procedure Laterality Date    APPENDECTOMY      BENCH LIVER N/A 2016    Procedure: BENCH LIVER;  Surgeon: Enoc Crews MD;  Location: [facility] OR    BRONCHOSCOPY FLEXIBLE AND RIGID N/A 2022    Procedure: BRONCHOSCOPY;  Surgeon: Alena Valenzuela MD;  Location:  GI    COLONOSCOPY  2013    repeat in 2018    COLONOSCOPY N/A 10/04/2019    Procedure: COLONOSCOPY, WITH POLYPECTOMY AND BIOPSY;  Surgeon: Go Chong MD;  Location: [facility] OR    CREATE FISTULA ARTERIOVENOUS UPPER EXTREMITY Left 2023    Procedure: LEFT UPPER EXTREMITY ARTERIOVENOUS FISTULA CREATION. LIGATION OF COMPETING VASCULAR BRANCHES- LEFT;  Surgeon: Deejay Mcneil MD;  Location:  OR    CV PERICARDIOCENTESIS N/A 2023    Procedure: Pericardiocentesis;  Surgeon: Barry Mckeon MD;  Location:  HEART CARDI[facility] CATH LAB    HERNIA REPAIR      IR CHEST TUBE PLACEMENT NON-TUNNELED RIGHT  2022    IR CVC TUNNEL PLACEMENT > 5 YRS OF AGE  2022    IR DIALYSIS FISTULOGRAM LEFT  2023    IR GASTROSTOMY TUBE CHANGE  2023    IR GASTROSTOMY TUBE PERCUTANEOUS PLCMNT  2022    IR PARACENTESIS  2022    IR PARACENTESIS  2022    IR THORACENTESIS  2022    IR THORACENTESIS  2020    IR THORACENTESIS  08/10/2020    IR TRANSCATHETER BIOPSY  2022    REVISION FISTULA ARTERIOVENOUS UPPER EXTREMITY Left 8/15/2023    Procedure: 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 2; OUTFLOW REVISION CEPHALIC TO BRACHIAL VEIN;  Surgeon: Deejay Mcneil MD;  Location:  OR    TRACHEOSTOMY N/A 2022    Procedure: TRACHEOSTOMY;  Surgeon: Nilesh Jackson MD;  Location:  OR    TRANSPLANT LIVER RECIPIENT  DONOR N/A 2016    Procedure: TRANSPLANT LIVER RECIPIENT  DONOR;  Surgeon: Enoc Crews MD;  Location: [facility] OR            Family History:     Family History   Problem Relation Age of  Onset    Coronary Artery Disease No family hx of     Cardiomyopathy No family hx of             Social History:     Social History     Tobacco Use    Smoking status: Never    Smokeless tobacco: Never   Substance Use Topics    Alcohol use: Not Currently     Alcohol/week: 0.0 standard drinks of alcohol     History   Sexual Activity    Sexual activity: Not Currently            Current Medications:      apixaban ANTICOAGULANT  5 mg Oral BID    ceFEPIme  1 g Intravenous Q24H    epoetin mirta-epbx  4,000 Units Intravenous Once in dialysis/CRRT    gabapentin  100 mg Oral Daily    Lacosamide  100 mg Oral QPM    lacosamide  50 mg Oral QAM    multivitamin RENAL  1 capsule Oral Daily    - MEDICATION INSTRUCTIONS -   Does not apply Once    sevelamer carbonate  800 mg Oral 4x Daily    sodium chloride (PF)  9 mL Intracatheter During Dialysis/CRRT (from stock)    sodium chloride (PF)  9 mL Intracatheter During Dialysis/CRRT (from stock)    sodium chloride 0.9%  250 mL Intravenous Once in dialysis/CRRT    sodium chloride 0.9%  300 mL Hemodialysis Machine Once    tacrolimus  0.5 mg Oral Q12H            Allergies:   No Known Allergies         Physical Exam:   Vitals were reviewed  Temp:  [97.7  F (36.5  C)-98.4  F (36.9  C)] 97.7  F (36.5  C)  Pulse:  [69-79] 77  Resp:  [13-20] 19  BP: ()/(63-73) 112/66  SpO2:  [97 %-100 %] 98 %    Vitals:    10/23/23 0100   Weight: 86.6 kg (190 lb 14.7 oz)       Constitutional: Pleasant male seen lying in bed, in NAD. Alert and interactive.   HEENT: NCAT, anicteric sclerae, conjunctiva clear. Moist mucous membranes.  Respiratory: Non-labored breathing, good air exchange. Lungs are CTAB.   Cardiovascular: Regular rate and rhythm.  GI: Normoactive BS. Abdomen is soft, moderately distended, and tender to palpation, negative rebound.  Skin: Warm and dry. No rashes.  Musculoskeletal: Extremities grossly normal. L AV fistula with some erythema overlying distal graft. Thrill present.  Neurologic: A &O  x3, speech normal, answering questions appropriately. Moves all extremities spontaneously. Grossly non-focal.  Neuropsychiatric: Mentation and affect normal/appropriate.  VAD: HD catheter.            Laboratory Data:     Microbiology:  Culture   Date Value Ref Range Status   10/16/2023 No Growth  Final   10/16/2023 No growth after 6 days  Preliminary   10/12/2023 No Growth  Final   10/11/2023 No Growth  Final   10/10/2023 No Growth  Final   10/06/2023 No Growth  Final   10/06/2023 No anaerobic organisms isolated  Final   10/06/2023 No growth after 16 days  Preliminary   10/05/2023 No Growth  Final   10/05/2023 No Growth  Final   09/29/2023 No Growth  Final   03/06/2023 No Growth  Final   01/31/2023 No Growth  Final   01/31/2023 No Growth  Final   01/24/2023 No Growth  Final   01/24/2023 No anaerobic organisms isolated  Final   01/13/2023 No Growth  Final   01/13/2023 No Growth  Final   01/13/2023 No anaerobic organisms isolated  Final   01/13/2023 No Growth  Final   01/13/2023 No Growth  Final   01/13/2023 No anaerobic organisms isolated  Final   01/13/2023 No Growth  Final   01/10/2023 No Growth  Final   01/10/2023 No Growth  Final   12/26/2022 No Growth  Final   12/26/2022 No Growth  Final   12/22/2022 Isolated in broth only Lactobacillus species (A)  Final     Comment:     On day 4 of incubationIdentification obtained by MALDI-TOF mass spectrometry research use only database. Test characteristics determined and verified by the Infectious Diseases Diagnostic Laboratory.Susceptibilities not routinely done   12/22/2022 Isolated in broth only Lactobacillus species (A)  Final     Comment:     On day 3 of incubationIdentification obtained by MALDI-TOF mass spectrometry research use only database. Test characteristics determined and verified by the Infectious Diseases Diagnostic Laboratory.   12/21/2022 No Growth  Final   12/21/2022 No Growth  Final   12/17/2022 (A)  Final    Isolated in broth only Candida parapsilosis  complex     Comment:     On day 5 of incubation  Susceptibilities not routinely done   12/16/2022 2+ Staphylococcus epidermidis (A)  Final     Comment:     Susceptibilities not routinely done   12/16/2022 2+ Candida parapsilosis complex (A)  Final     Comment:     Susceptibilities not routinely done   12/16/2022 No Growth  Final   12/15/2022 No MRSA isolated  Final   12/01/2022 No Growth  Final   12/01/2022 No anaerobic organisms isolated  Final   11/26/2022 No Growth  Final   11/16/2022 No Growth  Final   11/15/2022 1+ Aspergillus terreus (A)  Final   11/14/2022 No Growth  Final   11/14/2022 No Growth  Final   11/13/2022 No Growth  Final   11/13/2022 No Growth  Final   11/13/2022 1+ Staphylococcus epidermidis (A)  Final     Comment:     Susceptibilities not routinely done   11/12/2022 Positive on the 1st day of incubation (A)  Final   11/12/2022 Streptococcus pneumoniae (AA)  Corrected     Comment:     2 of 2 bottles     11/12/2022 No Growth  Final       Culture Micro   Date Value Ref Range Status   09/28/2016 No VRE isolated  Final   09/23/2016 No growth  Final   09/20/2016   Final    Culture negative for acid fast bacilli  Assayed at [facility] [address]     09/20/2016 No anaerobes isolated  Final   09/20/2016 No growth  Final   09/20/2016 Culture negative after 4 weeks  Final       Inflammatory Markers    Recent Labs   Lab Test 11/22/22  0025   CRP 22.3*       Metabolic Studies       Recent Labs   Lab Test 10/23/23  0654 10/22/23  0959 10/21/23  0748 10/20/23  1949 10/19/23  0810 10/18/23  1306 10/11/23  1746 10/11/23  1430 10/06/23  1530 10/06/23  1040 10/06/23  0905 10/06/23  0533 10/05/23  1611 10/05/23  1244 10/05/23  0248 10/05/23  0245 08/16/23  0607 08/10/23  1032 04/25/23  1113 04/24/23  0815   * 125* 127* 128* 131* 133*   < >  --    < >  --   --  134*  --   --    < > 136   < > 141   < > 138   POTASSIUM 5.5* 4.7 4.2 4.3 4.4 3.7   < >  --    < >  --   --  4.6  --   --    <  > 3.8   < > 4.3   < > 4.9   CHLORIDE 87* 91* 91* 94* 95* 94*   < >  --    < >  --   --  94*  --   --   --  90*   < > 95*   < > 98   CO2 19* 23 24 25 22 26   < >  --    < >  --   --  23  --   --   --  24   < > 32*   < > 25   ANIONGAP 15 11 12 9 14 13   < >  --    < >  --   --  17*  --   --   --  22*   < > 14   < > 15   BUN 87.9* 74.8* 48.6* 39.9* 49.4* 28.3*   < >  --    < >  --   --  60.5*  --   --   --  44.5*   < > 45.6*   < > 93.8*   CR 8.03* 6.90* 5.43* 4.37* 5.52* 3.81*   < >  --    < >  --   --  7.03*  --   --   --  5.77*   < > 5.76*   < > 7.83*   GFRESTIMATED 7* 9* 11* 15* 11* 17*   < >  --    < >  --   --  8*  --   --   --  11*   < > 11*   < > 7*   * 126* 116* 141* 142* 174*   < >  --    < >  --    < > 152*   < >  --   --  127*   < > 104*   < > 97   A1C  --   --   --   --   --   --   --   --   --   --   --   --   --   --   --   --   --  4.7  --   --    KELLY 8.8 8.4* 8.7 8.4* 8.8 8.8   < >  --    < >  --   --  8.9  --   --   --  9.4   < > 9.5   < > 10.4*   PHOS  --   --   --   --   --   --   --   --   --  6.6*  --  6.7*  --   --   --  5.2*   < >  --    < > 8.0*   MAG  --   --   --   --   --   --   --   --   --   --   --   --   --   --   --  2.1  --   --   --  2.4*   LACT  --   --   --   --   --   --   --  0.4  --   --   --   --   --  0.6*   < >  --   --   --   --   --     < > = values in this interval not displayed.       Hepatic Studies    Recent Labs   Lab Test 10/23/23  0654 10/22/23  0959 10/21/23  0748 10/20/23  1949 10/19/23  0810 10/18/23  1306 01/14/23  0615 01/13/23  0553 01/12/23  0621   BILITOTAL 0.4 0.3 0.4 0.4 0.4 0.5   < >  --  0.4   ALKPHOS 177* 164* 182* 185* 203* 187*   < >  --  368*   ALBUMIN 3.2* 2.8* 3.2* 3.0* 3.0* 3.1*   < >  --  2.1*   AST 17 13 13 13 15 11   < >  --  19   ALT <5 6 7 7 5 7   < >  --  11   LDH  --   --   --   --   --   --   --  217 217    < > = values in this interval not displayed.       Pancreatitis testing    Recent Labs   Lab Test 10/13/23  0850 12/21/22  1315  11/20/22  1151 04/20/20  1157 08/01/19  1500 01/08/19  0840 10/31/17  1037 09/22/16  0741 09/20/16  1259   AMYLASE  --   --   --   --   --   --   --  72 86   LIPASE 24  --   --   --   --   --   --  63*  --    TRIG 65 106 94 192* 177* 135   < >  --   --     < > = values in this interval not displayed.       Hematology Studies      Recent Labs   Lab Test 10/23/23  0654 10/22/23  1655 10/21/23  0748 10/20/23  1050 10/18/23  1306 10/17/23  0633 11/22/22  0423 11/22/22  0025 11/16/22  0147 11/15/22  1822 11/15/22  1132 11/15/22  1007 11/14/22  0657 11/13/22  0828 11/12/22  1502 11/12/22  1147   WBC 6.0 6.2 4.6 4.4 5.4 7.6   < > 47.0*   < > 55.8* 60.0* 58.0*  58.0*   < > 16.3*   < > 17.2*   ANEU  --   --   --   --   --   --   --  39.0*  --  52.5* 54.6* 49.9*  --  14.7*  --  12.6*   ALYM  --   --   --   --   --   --   --  3.3  --  1.7 1.8 2.3  --  0.8  --  2.1   MARCELO  --   --   --   --   --   --   --  3.8*  --  0.0 1.2 0.6  --  0.8  --  1.9*   AEOS  --   --   --   --   --   --   --  0.0  --  0.0 0.0 0.0  --  0.0  --  0.0   HGB 9.0* 8.3* 9.1* 9.1* 8.8* 7.9*   < > 10.5*   < > 11.5* 11.4* 11.7*  11.7*   < > 11.3*   < > 11.6*   HCT 28.0* 26.0* 29.6* 28.2* 27.4* 25.0*   < > 32.6*   < > 34.5* 34.7* 35.2*  35.2*   < > 36.0*   < > 40.0    175 169 157 151 133*   < > 229   < > 87* 95* 88*  88*   < > 115*   < > 237    < > = values in this interval not displayed.       Arterial Blood Gas Testing    Recent Labs   Lab Test 03/12/23  1117 03/10/23  1457 03/08/23  1640 03/07/23  1657 03/06/23  1253 03/05/23  2059 03/05/23  1605 12/20/22  0719 11/25/22  1947 11/23/22  1107 11/23/22  0442 11/22/22  1839   PH  --   --   --   --   --  7.30*  --  7.43  --  7.34* 7.47* 7.47*   PCO2  --   --   --   --   --  57*  --  40  --  43 32* 33*   PO2  --   --   --   --   --  94  --  118*  --  99 100 104   HCO3  --   --   --   --   --  28  --  27  --  23 23 24   O2PER 0 21 21 25   < > 30   < > 30   < > 30 40 40    < > = values in this interval  "not displayed.        Urine Studies     Recent Labs   Lab Test 11/25/22  1859 10/31/17  1038 09/24/16  1055 09/23/16  1458 03/01/16  0644   URINEPH 5.5 5.0 5.0 7.5* Duplicate request  SEE [id number]    5.0   NITRITE Negative Negative Negative Negative Duplicate request  SEE [id number]  *  Negative   LEUKEST Small* Negative Moderate* Large* Duplicate request  SEE [id number]  *  Negative   WBCU  --  1 13* 103* 2*       Vancomycin Levels     Recent Labs   Lab Test 11/16/22  0546 11/15/22  0503 11/14/22  1020 11/14/22  0551 11/13/22  0516   VANCOMYCIN 13.6 14.7 18.1 19.8 23.0     Recent Labs   Lab Test 11/16/22  0546 11/15/22  0503 11/14/22  1020   VANCOMYCIN 13.6 14.7 18.1       Tobramycin levels     No lab results found.    Gentamicin levels    No lab results found.    CSF testing     Recent Labs   Lab Test 12/21/22  1526   CWBC 3   CRBC 58*   CGLU 69   CTP 71*       Hepatitis B Testing   Recent Labs   Lab Test 09/27/23  1750 04/24/23  0815 03/29/23  0800 03/01/23  0901 02/02/23  0618   HBCAB  --   --   --  Nonreactive Nonreactive   HEPBANG Nonreactive Nonreactive   < > Nonreactive Nonreactive    < > = values in this interval not displayed.       Hepatitis C Testing     Hepatitis C Antibody   Date Value Ref Range Status   09/20/2016  NR Final    Nonreactive   Assay performance characteristics have not been established for newborns,   infants, and children     03/01/2016  NR Final    Nonreactive   Assay performance characteristics have not been established for newborns,   infants, and children         Respiratory Virus Testing    No results found for: \"RS\", \"FLUAG\"    Last check of C difficile  C Difficile Toxin B by PCR   Date Value Ref Range Status   10/10/2023 Negative Negative Final     Comment:     A negative result does not exclude actual disease due to C. difficile and may be due to improper collection, handling and storage of the specimen or the number of organisms in the specimen is below the detection limit of the " assay.              Imaging:     Results for orders placed or performed during the hospital encounter of 10/05/23   CT Chest (PE) Abdomen Pelvis w Contrast     Value    Radiologist flags (AA)     New diagnosis of pulmonary embolism, as well as a large pericardial effusion.    Narrative    EXAM: CT CHEST PE ABDOMEN PELVIS W CONTRAST  LOCATION: [facility]  DATE: 10/5/2023    INDICATION: Hypotension, recent pericardial effusion  COMPARISON: CT abdomen/pelvis without contrast 03/09/2023, CT chest without contrast 03/07/2023  TECHNIQUE: CT chest pulmonary angiogram and routine CT abdomen pelvis with IV contrast. Arterial phase through the chest and venous phase through the abdomen and pelvis. Multiplanar reformats and MIP reconstructions were performed. Dose reduction   techniques were used.   CONTRAST: 90mL Isovue 370    FINDINGS:    ANGIOGRAM CHEST: Pulmonary arteries are increased in size with the main pulmonary artery measuring 39 mm (series 6 image 83). Segmental and subsegmental pulmonary artery emboli to the left upper lobe.  No definite right heart strain. Thoracic aorta is   negative for dissection.     LUNGS AND PLEURA: Small volume loculated right pleural effusion with thickening of the pleura redemonstrated. Right lung granulomas. Right lower lobe consolidation suspicious for pneumonia. Small left pleural effusion with compressive atelectasis.   Smaller consolidation in the inferior left lower lobe also suspicious for pneumonia.    MEDIASTINUM/AXILLAE: Cardiomegaly. Large pericardial effusion. This measures in greatest depth of 25 mm which is suggestive of at least 500 mL of fluid. Right internal jugular dual-lumen central venous catheter which terminates at the junction of the   right atrium and suprahepatic IVC. Prominent mediastinal and hilar lymph nodes, likely reactive.    CORONARY ARTERY CALCIFICATION: Moderate.    HEPATOBILIARY: Prior liver transplant. Tiny hepatic cyst  requiring no dedicated follow-up. No focal suspicious hepatic lesion. Patent portal veins. Cholecystectomy. Mildly prominent intrahepatic bile ducts.    PANCREAS: Normal.    SPLEEN: Splenomegaly.    ADRENAL GLANDS: Normal.    KIDNEYS/BLADDER: Bilateral nonobstructing calyceal calculi measuring up to 5 mm in the right upper pole. No hydronephrosis. Decompressed urinary bladder.    BOWEL: Small to moderate abdominopelvic ascites. Prominent gas and fluid-filled loops of small and large bowel likely reactive ileus. No bowel obstruction. Appendectomy.    LYMPH NODES: Normal.    VASCULATURE: Atherosclerotic calcifications of the aortoiliac vessels. No AAA. Multiple upper abdominal collaterals. Right femoral approach arterial catheter which terminates in the right external iliac artery. Right femoral approach venous catheter   which terminates in the right common iliac vein.    PELVIC ORGANS: Prominent prostate.    MUSCULOSKELETAL: Small inguinal hernias containing fat and ascites. Gynecomastia. Multilevel degenerative changes of the thoracic and lumbosacral spine. Mild wedge compression deformities of T10 and L1 are unchanged. No definite acute osseous   abnormality.      Impression    IMPRESSION:    1.  Large pericardial effusion. This measures 25 mm in greatest depth which is suggestive of at least 500 mL of fluid.  2.  Pulmonary arteries are increased in size with the main pulmonary artery measuring 39 mm. This is consistent with pulmonary arterial hypertension. Segmental and subsegmental pulmonary artery emboli are seen to the left upper lobe. No definite right   heart strain.  3.  Small bilateral pleural effusions with the right effusion appearing chronically loculated with pleural thickening. Bilateral lung consolidations right greater than left suspicious for multifocal pneumonia.  4.  Prior liver transplant. Multiple upper abdominal collaterals and splenomegaly suggestive of portal hypertension, with small to  moderate abdominopelvic ascites.  5.  Prominent gas and fluid-filled loops of small and large bowel likely reactive ileus.      [Critical Result: New diagnosis of pulmonary embolism, as well as a large pericardial effusion.]    Finding was identified on 10/5/2023 6:14 AM CDT.     Dr. Carmona was contacted by me on 10/5/2023 6:39 AM CDT and verbalized understanding of the critical results.    POC US ECHO LIMITED    Impression    Limited bedside ultrasound of the heart demonstrates a small pericardial effusion and tachycardia.   POC US GUIDED VASCULAR ACCESS    Impression    Ultrasound was utilized to visualize cannulation of the right femoral vein, needle was visualized cannulating the vein and subsequently wire was noted to be in the lumen of the vein.  No complications.   US Lower Extremity Venous Duplex Bilateral    Narrative    [facility]  DATE: 10/5/2023    EXAM: BILATERAL LOWER EXTREMITY VENOUS ULTRASOUND    INDICATION: Recent diagnosis PE on anticoagulation.    TECHNIQUE: Duplex imaging was performed utilizing gray-scale,  compression, augmentation as appropriate, color-flow, Doppler waveform  analysis, and spectral Doppler imaging.    COMPARISON: None..    FINDINGS:     RIGHT LOWER EXTREMITY:   The right common femoral and great saphenous veins are unable to be  assessed due to arterial line in place. The right profunda femoral,  femoral, popliteal, and segmentally visualized calf veins are patent  and compressible with appropriate vascular waveforms. No deep venous  thrombus is identified.    LEFT LOWER EXTREMITY:   The left common femoral, profunda femoral, femoral, popliteal, and  segmentally visualized calf veins are patent and compressible with  appropriate vascular waveforms. No deep venous thrombus is identified.  The great saphenous vein is patent.      Impression    IMPRESSION:   The right CFV and GSV are unable to be assessed due to the existing  arterial line. Exam is otherwise negative for  deep vein thrombosis  bilaterally.    MARY JO FAN MD         SYSTEM ID:  [id number]   US Upper Extremity Venous Duplex Right    Narrative    [facility]  DATE: 10/5/2023    EXAM: RIGHT UPPER EXTREMITY VENOUS ULTRASOUND    INDICATION: Recently diagnosed PE on heparin.     TECHNIQUE: Duplex imaging was performed utilizing gray-scale,  compression, augmentation as appropriate, color-flow, and spectral  Doppler imaging with Doppler waveform analysis.     COMPARISON: None.    FINDINGS:  RIGHT ARM:  The right internal jugular vein is patent and  compressible. The visualized innominate and subclavian veins are  patent with normal phasic waveforms. The right axillary, basilic, and  brachial veins are patent and compressible. Short segment thrombosis  in the right cephalic vein at the antecubital fossa. The right chest  tunnel line is not well evaluated due to acoustic shadowing.      Impression    IMPRESSION:   1. Negative for right upper extremity DVT.  2. Short segment DVT in the right cephalic vein at the antecubital  fossa, which may be related to prior intravenous access.    MARY JO AFN MD         SYSTEM ID:  [id number]   US Upper Extremity Venous Duplex Left    Narrative    VENOUS DUPLEX ULTRASOUND LEFT UPPER EXTREMITY  10/5/2023 2:57 PM     HISTORY: Patient with bilateral PEs, request made for evaluation of  DVT.    COMPARISON: None.    TECHNIQUE: Color Doppler and spectral waveform analysis performed  throughout the deep veins of the left upper extremity. Patient also  has an AV fistula, therefore color Doppler and spectral waveform  analysis obtained in the inflow brachial artery as well as outflow  fistulized vein.    FINDINGS: The left internal jugular, subclavian, axillary, and  brachial veins demonstrate normal blood flow, compression (where  applicable), and augmentation. Radial and ulnar veins are  compressible. Basilic vein is patent. Total blood flow volume is 1920  mL/min. Shortly beyond the  mid to upper portion of the upper arm,  there is an area of narrowing measuring 2.6 mm with velocity of  515/301 cm/sec. More central segments are 4.9-8.8 mm. An area of  irregularity is noted in the upper arm, perhaps a small  pseudoaneurysmal segment measuring 7 x 2 mm.      Impression    IMPRESSION:  1. Negative for DVT in the left upper extremity.  2. Patent AV fistula with good blood flow volume of 1920 mL/min.  Moderate stenosis in the mid to upper outflow cephalic vein with  diameter reduced to 2.6 mm.    ABEBA SIMONS MD         SYSTEM ID:  [id number]   XR Chest Port 1 View    Narrative    EXAM: XR CHEST PORT 1 VIEW  LOCATION: [facility]  DATE: 10/6/2023    INDICATION: hypoxia  COMPARISON: 10/05/2023      Impression    IMPRESSION: Cardiac enlargement. Dual-lumen central line tip right atrium. Bibasilar atelectasis or infiltrate and small effusions.   US Paracentesis with Albumin    Narrative    ULTRASOUND PARACENTESIS WITH ALBUMIN 10/10/2023 1:35 PM     HISTORY: Status post liver transplant has portal hypertension. Had tap  in ICU.    FINDINGS: Ultrasound was used to evaluate for the presence and best  approach for paracentesis. Written and oral informed consent was  obtained. A pause for the cause procedure to verify the correct  patient and correct procedure. The skin overlying the left lower  quadrant was prepped and draped in the usual sterile fashion. The  subcutaneous tissues were anesthetized with 10 mL 1% lidocaine. A  catheter was advanced into the peritoneal space and 4.65 L of  straw  colored fluid was drained. There were no immediate complications.  Ultrasound images were permanently stored.  Patient left the  ultrasound suite in satisfactory condition.      Impression    IMPRESSION: Technically successful paracentesis without immediate  complications.    HANS WESTON MD         SYSTEM ID:  [id number]   US Paracentesis with Albumin    Narrative    US PARACENTESIS WITH  ALBUMIN 10/12/2023 11:40 AM     HISTORY: Status post liver transplant with portal hypertension.    FINDINGS: Ultrasound was used to evaluate for the presence and best  approach for paracentesis. Written and oral informed consent was  obtained. A pause for the cause procedure to verify the correct  patient and correct procedure. The skin overlying the right lower  quadrant was prepped and draped in the usual sterile fashion. The  subcutaneous tissues were anesthetized with 10 mL 1% lidocaine. A  catheter was advanced into the peritoneal space and 3.4 L of  cloudy  light brown colored fluid was drained. There were no immediate  complications. Ultrasound images were permanently stored.  Patient  left the ultrasound suite in satisfactory condition.      Impression    IMPRESSION: Technically successful paracentesis without immediate  complications.    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 Written and oral informed consent was  obtained. A pause for the cause procedure to verify the correct  patient and correct procedure. The skin overlying the right upper  quadrant was prepped and draped in the usual sterile fashion. The  subcutaneous tissues were anesthetized with 10 mL 1% lidocaine. A  catheter was advanced into the peritoneal space and 2 L (max) of   straw colored fluid was drained. There were no immediate  complications. Ultrasound images were permanently stored.  Patient  left the ultrasound suite in satisfactory condition.      Impression    IMPRESSION: Technically successful paracentesis without immediate  complications.    JOSEPH SAMANO MD         SYSTEM ID:  [id number]   US Paracentesis  with Albumin    Narrative    US PARACENTESIS WITH ALBUMIN 10/19/2023 10:44 AM     HISTORY: ascites, abdominal pain    FINDINGS: Ultrasound was used to evaluate for the presence and best  approach for paracentesis. Written and oral informed consent was  obtained. A pause for the cause procedure to verify the correct  patient and correct procedure. The skin overlying the right lower  quadrant was prepped and draped in the usual sterile fashion. The  subcutaneous tissues were anesthetized with 10 mL 1% lidocaine. A  catheter was advanced into the peritoneal space and 4 L of  light  brown colored fluid was drained. There were no immediate  complications. Ultrasound images were permanently stored.  Patient  left the ultrasound suite in satisfactory condition.      Impression    IMPRESSION: Technically successful paracentesis without immediate  complications.    MELINDA SALVADOR MD         SYSTEM ID:  [id number]   Echo Limited     Value    LVEF  65-70%    Narrative    [id number]  [id number]  [id number]  [id number]^SONIDO^DONA^SUSHMA     [facility]  Echocardiography Laboratory  [address] [place] [address]     Name: MARY JO CRAIN  MRN: [id number]  : 1965  Study Date: 10/05/2023 04:17 AM  Age: 58 yrs  Gender: Male  Patient Location: [facility]  Reason For Study: Pericardial Effusion  Ordering Physician: DONA HEAD  Referring Physician: Ki Carter  Performed By: Kyle Angelo RDCS     BSA: 2.1 m2  Height: 72 in  Weight: 190 lb  HR: 105  BP: 104/74 mmHg  ______________________________________________________________________________  Procedure  Limited Portable Echo Adult. Optison (NDC #8700-5227) given intravenously.  ______________________________________________________________________________  Interpretation Summary     The rhythm appears to be atrial fibrillation with RVR. Confirm with ECG.     A small circumferential pericardial effusion is present with a moderate  amount  of pericardial effusion posteriorly.  There is no respiratory variation of mitral inflow or cardiac chamber  compression/collapse that would suggest tamponade. IVC is normal in size.     There are no echocardiographic indications of cardiac tamponade.     Compareed with the previous study. afib with RVR is new. The pericardial  effusion has increased in size slightly.  ______________________________________________________________________________  Left Ventricle  The left ventricle is normal in size. There is normal left ventricular wall  thickness. Left ventricular systolic function is normal. The visual ejection  fraction is 65-70%. Normal left ventricular wall motion.     Right Ventricle  The right ventricle is normal in structure, function and size.     Atria  Normal left atrial size. Right atrial size is normal. There is no atrial shunt  seen.     Mitral Valve  The mitral valve is normal in structure and function. There is trace mitral  regurgitation.     Tricuspid Valve  The tricuspid valve is normal in structure and function. There is trace  tricuspid regurgitation. Right ventricular systolic pressure could not be  approximated due to inadequate tricuspid regurgitation. IVC diameter <2.1 cm  collapsing >50% with sniff suggests a normal RA pressure of 3 mmHg.     Aortic Valve  The aortic valve is trileaflet with aortic valve sclerosis. There is trace  aortic regurgitation. No aortic stenosis is present.     Pulmonic Valve  The pulmonic valve is not well seen, but is grossly normal. There is trace  pulmonic valvular regurgitation.     Vessels  Normal size aorta. The inferior vena cava was normal in size with preserved  respiratory variability.     Pericardium  A small circumferential pericardial effusion is present with a moderate amount  of pericardial effusion posteriorly.  There is no respiratory variation of mitral inflow or cardiac chamber  compression/collapse that would suggest tamponade. IVC is  normal in size.  There are no echocardiographic indications of cardiac tamponade.     Rhythm  The rhythm appears to be atrial fibrillation with RVR. Confirm with ECG.  ______________________________________________________________________________  Report approved by: Tereso Fuller 10/05/2023 05:38 AM     ______________________________________________________________________________      Echocardiogram Limited     Value    LVEF  60-65%    Narrative    [id number]  [id number]  [id number]  [id number]^LYSSA^MANNY^NESSA     [facility]  Echocardiography Laboratory  [address] [place] [address]     Name: MARY JO CRAIN  MRN: [id number]  : 1965  Study Date: 10/07/2023 08:43 AM  Age: 58 yrs  Gender: Male  Patient Location: [facility]  Reason For Study: Pericardial Effusion  Ordering Physician: MANNY OMALLEY  Referring Physician: Ki Carter PA-C  Performed By: Ingrid Mora     BSA: 2.1 m2  Height: 72 in  Weight: 201 lb  HR: 77  BP: 102/62 mmHg  ______________________________________________________________________________  Procedure  Limited Portable Echo Adult. Optison (NDC #0258-1938) given intravenously.  ______________________________________________________________________________  Interpretation Summary     The visual ejection fraction is 60-65%.  No regional wall motion abnormalities noted.  Moderate pericardial effusion  There are no echocardiographic indications of cardiac tamponade.  Sinus rhythm was noted.  ______________________________________________________________________________  Left Ventricle  The visual ejection fraction is 60-65%. No regional wall motion abnormalities  noted.     Mitral Valve  There is trace mitral regurgitation.     Tricuspid Valve  There is trace tricuspid regurgitation. IVC diameter <2.1 cm collapsing >50%  with sniff suggests a normal RA pressure of 3 mmHg.     Aortic Valve  There is trace aortic regurgitation.      Pericardium  Moderate pericardial effusion. There are no echocardiographic indications of  cardiac tamponade.     Rhythm  Sinus rhythm was noted.  ______________________________________________________________________________  Doppler Measurements & Calculations  Ao V2 max: 240.0 cm/sec  Ao max P.0 mmHg  Ao V2 mean: 158.0 cm/sec  Ao mean P.0 mmHg  Ao V2 VTI: 41.6 cm  TR max brain: 266.6 cm/sec  TR max P.4 mmHg     ______________________________________________________________________________  Report approved by: Tereso Duval 10/07/2023 01:18 PM         Echocardiogram Limited    Narrative    [id number]  [id number]  [id number]  [id number]^BRUNILDA^DONI     [facility]  Echocardiography Laboratory  [address] [place] [address]     Name: MARY JO CRAIN  MRN: [id number]  : 1965  Study Date: 10/09/2023 03:36 PM  Age: 58 yrs  Gender: Male  Patient Location: [facility]  Reason For Study: Pericardial Effusion  Ordering Physician: DONI WORLEY  Performed By: Kelly Myers     ______________________________________________________________________________  Procedure  Limited Portable Echo Adult.  ______________________________________________________________________________  Interpretation Summary     Moderate to large pericardial effusion, slightly larger compared to the study  from 2 days ago. Measures 2.5 cm posteriorly. IVC is mildly dilated. Mild RV  diastolic collapse and has inflow velocitiies variation consistent with early  hemodynamic compromise. Correlate clinically. Reported findings to Dr. Hamilton  [facility] medicine MD.  ______________________________________________________________________________  Left Ventricle  Left ventricular systolic function is normal.     Right Ventricle  The right ventricle is normal in size and function.  ______________________________________________________________________________  MMode/2D Measurements & Calculations  asc Aorta Diam: 3.8       ______________________________________________________________________________  Report approved by: Tereso Mayfield 10/09/2023 04:14 PM         Echocardiogram Limited    Narrative    [id number]  [facility]  [id number]  [id number]^MARCH^MARIA^KIMBERLEY     [facility]  Echocardiography Laboratory  [address] [place] [address]     Name: MARY JO CRAIN  MRN: [id number]  : 1965  Study Date: 10/11/2023 01:21 PM  Age: 58 yrs  Gender: Male  Patient Location: [facility]  Reason For Study: Pericardial Effusion  Ordering Physician: MARIA ORTEZ  Referring Physician: Ki Carter  Performed By: Kyle Angelo RDCS     BSA: 2.1 m2  Height: 72 in  Weight: 197 lb  BP: 97/78 mmHg  ______________________________________________________________________________  Procedure  Echo Guided Centesis Adult.  ______________________________________________________________________________  Interpretation Summary     Limited 2 D echocardiogram to guide pericardiocentesis     Pre-procedure there a moderate to large sized pericardial effusion on  modiified subcostal views with fearues suggestive of early tamponade.     Post removal of 560 ml fluid there is only a small residual pericardial  effusion with excellent expansion of RV and no features of tamponade.     The findings are consistent with successful pericardiocentesis  ______________________________________________________________________________  ______________________________________________________________________________  Report approved by: Dr. Ki Gtz 10/11/2023 04:35 PM     ______________________________________________________________________________      Echocardiogram Limited     Value    LVEF  60-65%    Narrative    [id number]  [id number]  [id number]  [id number]^MARTHA^EDYTA^AHMAD     [facility]  Echocardiography Laboratory  [address] [place] [address]     Name: MARY JO CRAIN  MRN:  [id number]  : 1965  Study Date: 10/12/2023 02:07 PM  Age: 58 yrs  Gender: Male  Patient Location: [facility]  Reason For Study: Pericardial Effusion  Ordering Physician: EDYTA THOMAS  Performed By: Jocelynn Bravo RDCS     BSA: 2.1 m2  Height: 72 in  Weight: 193 lb  HR: 76  BP: 96/54 mmHg  ______________________________________________________________________________  Procedure  Limited Portable Echo Adult.  ______________________________________________________________________________  Interpretation Summary     Left ventricular systolic function is normal.  The visual ejection fraction is 60-65%.  The right ventricle is normal in structure, function and size.  Valves not visualized on limited study.  Pericardial effusion consists of thick echodense material, maximum dimension  1.0 cm adjacent to anterolaterl wall segment. Minimal free fluid is  demonstrated.     ______________________________________________________________________________  Left Ventricle  The left ventricle is normal in size. Left ventricular systolic function is  normal. The visual ejection fraction is 60-65%. Normal left ventricular wall  motion.     Right Ventricle  The right ventricle is normal in structure, function and size.     Atria  The left atrium is not well visualized. Right atrium not well visualized.     Mitral Valve  The mitral valve is not well visualized.     Tricuspid Valve  The tricuspid valve is not well visualized.     Aortic Valve  The aortic valve is not well visualized.     Pulmonic Valve  The pulmonic valve is not well visualized.     Vessels  The aortic root is normal size.     Pericardium  Pericardial effusion consists of thick echodense material, maximum dimension  1.0 cm adjacent to anterolaterl wall segment. Minimal free fluid is  demonstrated.     ______________________________________________________________________________  Report approved by: Tereso Denney 10/12/2023 03:00 PM      ______________________________________________________________________________      Echo Limited    Narrative    [id number]  [id number]  [id number]  [id number]^AYESHA^DARREN^ERASMO     [facility]  Echocardiography Laboratory  [address] [place] [address], [place] [address]     Name: MARY JO CRAIN  MRN: [id number]  : 1965  Study Date: 10/17/2023 07:44 AM  Age: 58 yrs  Gender: Male  Patient Location: [facility]  Reason For Study: Pericardial Effusion  Ordering Physician: DARREN HODGE  Referring Physician: DARREN HODGE  Performed By: TAE Gage     BSA: 2.1 m2  Height: 72 in  Weight: 187 lb  HR: 72  BP: 95/58 mmHg  ______________________________________________________________________________  Procedure  Limited Portable Echo Adult.  ______________________________________________________________________________  Interpretation Summary     Hyperdynamic left ventricular systolic function. The visual ejection fraction  is >70%. Normal left ventricular wall motion.  The right ventricle is normal in structure, function and size.  No pericardial efffusion.  Compared to prior study from 10/12/2023, there is no pericarial effusion  today.  ______________________________________________________________________________  ______________________________________________________________________________  MMode/2D Measurements & Calculations  Ao root diam: 3.7 cm  LVOT diam: 2.1 cm  LVOT area: 3.5 cm2  Ao root diam index Ht(cm/m): 2.0  Ao root diam index BSA (cm/m2): 1.8     Doppler Measurements & Calculations  Ao V2 max: 252.3 cm/sec  Ao max P.0 mmHg  Ao V2 mean: 170.7 cm/sec  Ao mean P.7 mmHg  Ao V2 VTI: 48.8 cm  WOLFGANG(I,D): 1.8 cm2  WOLFGANG(V,D): 1.9 cm2  LV V1 max P.7 mmHg  LV V1 max: 138.7 cm/sec  LV V1 VTI: 25.5 cm  SV(LVOT): 88.8 ml  SI(LVOT): 42.9 ml/m2  AV Baljeet Ratio (DI): 0.55  WOLFGANG Index (cm2/m2): 0.88     ______________________________________________________________________________  Report  approved by: Tereso Ambrosio 10/17/2023 11:44 AM         Cardiac Catheterization    Narrative      Right sided filling pressures are normal.    Left sided filling pressures are normal.    Mild elevated pulmonary hypertension.    Normal cardiac output level.    Large pericardial effusion status post pericardiocentesis - 560 mL.   Normal right and left-sided filling pressures.  Normal cardiac output.  The elevated pulmonary pressures.  Portal hypertension with hepatic vein pressure gradient of 10 mmHg.       *Note: Due to a large number of results and/or encounters for the requested time period, some results have not been displayed. A complete set of results can be found in Results Review.

## 2023-10-23 NOTE — PLAN OF CARE
[id number]  Neuro: A&Ox4. Afebrile.  Cardiac: SR. VSS.   Respiratory: Sating >95% on overnight CPAP. Pt refused pulse oximetry. Team aware Brandon Canchola with spot checks.  GI/: Anuric, pt on HD. No BM on this shift. Abdominal distension, non tender.  Diet/appetite: Tolerating 2g Na, low fat diet. Eating well.  Activity:  Independent, not OOB this shift.  Pain: At acceptable level on current regimen. Oxy 5mg given x1.  Skin: No new deficits noted. Pt refused PCDs calming it is causing itchiness.  LDA's: 1 R PIV, SL. R CVC port. L fistula.    Plan: Continue with POC. Notify primary team with changes.     Dialysis in the AM

## 2023-10-23 NOTE — PROGRESS NOTES
CLINICAL NUTRITION SERVICES - ASSESSMENT NOTE      Nutrition Prescription     RECOMMENDATIONS FOR MDs/PROVIDERS TO ORDER:  Consider low K diet vs Renal dialysis diet (patient's Phos WNL), to minimize the amount of restriction on meal ordering- patient also has Na, fat and fluid restrictions.     Malnutrition Status:    Moderate malnutrition in the context of acute illness     Recommendations already ordered by Registered Dietitian (RD):  - Discussed diet with primary team, who changed diet from low saturated fat to very low fat  - Ensure Clear (apple) TID and Gelatein Plus (pineapple) TID -> per Nephrologist, Ensure clear does not have to count towards fluid restriction, but wants Gelatein plus to count towards fluid restriction  - Provided renal dialysis menu and highlighted items that are also low in fat/sodium  - Room service appropriate with assist to help with ordering food with multiple nutrient restrictions     Future/Additional Recommendations:  Monitor appetite/intake including supplement intake, pertinent labs, weight trends, diet restrictions/advancements.      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 s/p liver transplant  in 2016 now with progressive cirrhosis (liver bx 12/2022) with evidence of portal hypertension and ascites, paroxysmal atrial fibrillation on Eliquis, DM type 2, history of CVA, hypertension, CHRISTIAN on BiPAP, chronic pericardial effusion, ESRD on dialysis who presented to [facility] ED on 10/5/23 with chest discomfort and palpitations, admitted with severe sepsis with septic shock requiring pressors secondary to multifactorial pneumonia, atrial fibrillation with aberrancy s/p 2 failed attempts at cardioversion, worsening pericardial effusion on ECHO s/p pericardiocentesis 10/11, and PE  "started on IV heparin. 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 Has required multiple taps with subsequent chylous ascites findings despite tailored diet.     Paracentesis:  10/6: 3L removed, triglycerides >400  10/10: 4.65L removed, triglycerides 503  10/12: 3.4L removed, triglycerides 303  10/16: 2L removed, triglycerides 332  10/19: 4L removed     NUTRITION HISTORY  RD spoke with patient while at dialysis. Patient reports good appetite, but is frustrated with diet restrictions. Reports he isn't able to order much, but he wants to do whatever he can to improve his health. Prefers apple Ensure clear TID and pineapple gelatein plus TID. Items he had been ordering during previous admission at [facility] included stir henry with chicken and vegetables, grilled chicken sandwich, grapes/pineapple, dry cereal, and supplements (Ensure clear TID and gelatein TID- provides 1170 kcals and 84 g protein).     Spoke with provider in patient's dialysis room regarding supplement intake and fluid restriction. Per provider, Ensure clear does not count need to count toward fluid restriction, but gelatein plus will.     Diet was ordered as low saturated fat vs very low fat diet, therefore was able to order ~7g total fat with breakfast tray this morning. Discussed diet order with primary team- they changed it to very low fat diet per GI recommendation.     Per H&P:   \"- GI wanted to continue very low fat diet, as do not expect TG to change quickly   - Likely plan to continue very low fat (LCT) diet for 2-3 weeks then liberalize fat restriction for LCT challenge and repeat TG on next paracentesis to evaluate if chylous ascites persists.  - Would avoid MCT supplementation in the setting of cirrhosis (increased risk for narcosis/altering mentation) \"     Per previous RD notes:   Pt had good appetite and consumed % of meals TID. 3 day calorie count average (10/13-15) = 1666 " "cals (76% needs), 105 gm pro (100% needs)      CURRENT NUTRITION ORDERS  Diet: 1000 mL Fluid Restriction, Low Saturated Fat/2400 mg Sodium, and Renal + Supplements (Gelatein plus and Ensure clear)  Intake/Tolerance: 1x 100% intake documented since admission     LABS  Na 121 (L)  K 5.5 (H), Phos 3.1 (WNL)  BUN 87.9 (H), Cr 8.03 (H)  Glucose 107 (H)     MEDICATIONS  Vimpat  Triphrocaps  Renvela QID  Tacrolimus     ANTHROPOMETRICS  Height: 182.9 cm (6' 0\")  Most Recent Weight: 86.6 kg (190 lb 14.7 oz)    IBW: 80.9 kg  BMI: Overweight BMI 25-29.9  Weight History:   Wt Readings from Last 15 Encounters:   10/23/23 86.6 kg (190 lb 14.7 oz)   10/22/23 84.7 kg (186 lb 11.2 oz)   10/03/23 86.2 kg (190 lb)   10/01/23 87.9 kg (193 lb 11.2 oz)   09/21/23 88.5 kg (195 lb)   09/11/23 86.6 kg (191 lb)   09/05/23 85.7 kg (189 lb)   08/16/23 88.2 kg (194 lb 8 oz)   08/10/23 88.2 kg (194 lb 8 oz)   07/13/23 83.9 kg (185 lb)   07/25/23 86.2 kg (190 lb)   07/11/23 86.6 kg (191 lb)   06/05/23 82.1 kg (181 lb)   05/30/23 84.5 kg (186 lb 4.8 oz)   05/04/23 86.2 kg (190 lb)   Wt appears to fluctuate between ~185-195 lbs in the past 5 months    Dosing Weight: 84.7 kg (driest weight in the past month)     ASSESSED NUTRITION NEEDS  Estimated Energy Needs: 2115 - 2540 kcals/day (25 - 30 kcals/kg)  Justification: Maintenance  Estimated Protein Needs: 127 - 169 grams protein/day (1.5 - 2 grams of pro/kg)  Justification: Dialysis and Increased needs  Estimated Fluid Needs: 1000 mL/day  Justification: On a fluid restriction and Per provider pending fluid status     PHYSICAL FINDINGS  See malnutrition section below.      MALNUTRITION  % Intake: Decreased intake does not meet criteria  % Weight Loss: Unable to assess -> confounded by fluid status  Subcutaneous Fat Loss: Facial region:  mild  Muscle Loss: Temporal:  mild and Thoracic region (clavicle, acromium bone, deltoid, trapezius, pectoral):  moderate  Fluid Accumulation/Edema: Does not meet " criteria  Malnutrition Diagnosis: Moderate malnutrition in the context of acute illness     NUTRITION DIAGNOSIS  Increased nutrient needs (protein) related to dialysis and chylous ascites as evidenced by estimated protein needs at 1.5-2 g/kg.       INTERVENTIONS  Implementation  Nutrition Education: RD role in care, protein needs with dialysis and chylous ascites   Provided Renal/dialysis diet menu and highlighted menu items that are also low in fat/sodium  Collaboration with other providers - discussed supplement relation to fluid restriction with Nephrologist, discussed very low fat diet vs low saturated fat diet with primary team  Medical food supplement therapy - Ensure Clear TID and Gelatein Plus TID  Room service appropriate with assist to help with meal ordering with many nutrient restrictions     Goals  Patient to consume % of nutritionally adequate meal trays TID, or the equivalent with supplements/snacks.     Monitoring/Evaluation  Progress toward goals will be monitored and evaluated per protocol.    LEONCIO Bae, RD, LD  6B/Obs RD pager [phone]  Weekend/Holiday RD pager [phone]

## 2023-10-23 NOTE — PROGRESS NOTES
Date: 10/23/2023  Time: 1756     Data:  Pre Wt:   86.6 kg  Desired Wt:   To be established  Post Wt:  84.6 kg (estimated)  Weight change: - 2 kg  Ultrafiltration - Post Run Net Total Removed (mL):  2000 ml  Vascular Access Status: CVC patent  Dialyzer Rinse:  Light  Total Blood Volume Processed: 76.7 L   Total Dialysis (Treatment) Time:   3.5 Hrs  Dialysate Bath: K 2, Ca 2.5  Heparin: Heparin: None     Lab:   No  HbsAg - NR (9/27/23)  HbsAb - NR  SUSCEPTIBLE  (9/27/23)     Interventions:  Dialysis done through Right  CVC  UF set to 2300 Liters of fluid removal, accommodating priming and rinse back volumes  , . Medication  administered per MAR .Treatment has ended safely and  blood is rinsed back completely.Catheter lumens flushed with saline and  catheter caps (ClearGuard ) changed post HD.Report given to PCN (abdulaziz), sent back to Bed [facility]- room in stable condition. Patient put on 1 liter a day fluid restriction.     Assessment:  A/O x 4, calm & cooperative, denies pain  Lung sounds clear  anterior and lateral BUL,  BLL  CVC intact, previous dressing clean and dry                Plan:    Per Renal team        RCIKI Haynes, RN  Acute Dialysis RN  [facility]

## 2023-10-23 NOTE — H&P
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 2016 now with progressive cirrhosis (liver bx 12/2022) with evidence of portal hypertension and ascites, paroxysmal atrial fibrillation on Eliquis, DM type 2, history of CVA, hypertension, CHRISTIAN on BiPAP, chronic pericardial effusion, ESRD on dialysis who presented to [facility] ED on 10/5/23 with chest discomfort and palpitations, admitted with severe sepsis with septic shock requiring pressors secondary to multifactorial pneumonia, atrial fibrillation with aberrancy s/p 2 failed attempts at cardioversion, worsening pericardial effusion on ECHO s/p pericardiocentesis 10/11, and PE started on IV heparin. 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 Has required multiple taps with subsequent chylous ascites findings despite tailored diet.    # Cirrhosis secondary to NAFLD s/p liver transplant 2016 now with progressive cirrhosis with evidence of portal hypertension and ascites   # SBP   # Chylous ascites   # Moderate protein-energy malnutrition  # Abdominal pain   - Patient follows with Dr. Simms, though notably has not been seen in years; last clinic visit 4/21/2020 with plan for 6 month follow up. Next appt 12/2023.  - PTA tacrolimus 1mg q 12 hours. Tacro level supratherapeutic at 8 on 10/11 (desired levels 3-5), held initially then resumed at 1mg in the morning and 0.5mg in the evening. Tacro level 4.9 on 10/19. Reduced dose to 0.5 in morning and 0.5 in the evening with plans to recheck on 10/24.  - Admit labs with normal LFTs other than mildly elevated alk phos which is elevated 203, labs are pending for today. INR 2.00 (1.55 on admission). WBC has been normal.   - Evidence  "of liver fibrosis in 2017 which could have resolved with complete abstinence from alcohol however in a note from 12/2022, patient's family reported alcohol intake. Liver bx 12/2022 with evidence of cirrhosis, but notably the sample was autolyzed with delayed fixation.   - Paracentesis (prior done in 2016)  - 10/6: 3L removed with 1119 nucleated cells, triglycerides >400 consistent with chylous ascites. SAAG 1.7 consistent with portal hypertension. Cytology negative for malignancy. Cultures are negative including aerobic culture, anaerobic culture and fungal.   - 10/10: 4.65L removed with 761 total nucleated cells, SAAG 3.4. Cultures negative. Triglycerides 503  - 10/12: 3.4L removed with 719 total nucleated cells, SAAG 2.8. Cultures negative. Cytology negative for malignancy. Flow cytometry with polytypic B cells, triglycerides 303, lipase 37.   - 10/16: 2L max removed with 3,844 total nucleated cells, SAAG 2.1, ANC 3,190, prelim aerobic cultures NGTD, prelim fungal culture NGTD, triglycerides 332. Prelim AFB culture and stain negative  - 10/19: 4L removed. Unfortunately my diagnostic paracentesis order was cancelled and diagnostics were not run on the fluid and are unable to be added on.   - Antibiotics: Zosyn (10/5-10/12) for pneumonia and possible SBP (no evidence of SBP on peritoneal fluid analysis 10/6), Augmentin (10/12-10/13) for pneumonia, Ceftriaxone for SBP (10/16 x1) switched to cefepime per ID (10/17- present)  - Infectious workup: Quantiferon gold TB negative, HIV negative, EBV detected <500, CMV negative, respiratory panel negative, strongyloides ab IgG negative, C diff negative, no history of pets, exposure to ticks or exotic travel  - s/p RHC 10/11 which showed normal right and left sided filling pressures, mild elevated pulmonary pressures and portal hypertension with hepatic vein pressure gradient of 10mmHg   - Per GI note: \"Ascites likely related to portal HTN in the setting of cirrhosis, further " "c/b disruption of lymphatics (d/t increased lymph flow and portal HTN seen with cirrhosis) leading to chylous ascites as reflective of persistent TG >200 in ascites fluid.  Currently on 2gm NA and very low fat diet. He continues to have ongoing TG elevations in ascites and requiring paracentesis every 2-4 days for relief of abd distention. Suspect ascites re-accumulation exacerbated by chyle formation. High risk for developing malnutrition (PRO losses) and further immunocompromise with chyle losses. Would avoid MCT supplementation in the setting of cirrhosis (increased risk for narcosis/altering mentation).\"    Plan:  - Hepatology consulted, appreciate recs   -Ongoing chylous ascites  - Infectious disease consulted, appreciate recs   -They were managing the abx   - Nutrition consulted, appreciate recs   - Management of potential malnutrition  - 3 day calorie count average with \"1666 cals (76% needs), 105 gm pro (100% needs) Calorie count reflects pt meeting at least 2/3 nutrition needs with oral intake\" per RD note 10/16    - Currently on 2gm NA and very low fat diet   - GI wanted to continue very low fat diet, as do not expect TG to change quickly   - Likely plan to continue very low fat (LCT) diet for 2-3 weeks then liberalize fat restriction for LCT challenge and repeat TG on next paracentesis to evaluate if chylous ascites persists.    - Per GI   - Repeat TG on the next paracentesis  - If chylous ascites persists despite compliance to low fat diet, consider IR consult for lymphoscintigraphy (possibly with SPECT CT) to locate site of lymphatic leak for embolization of leak.     -Labs:   -Tacrolimus level in AM    - Ordered tacrolimus as 0.5mg BID   - CMP   - CBC with diff  - Medications   - Cefepime 1g Q24h  - PTA tacrolimus 0.5mg BID    ----------------------------------------------------------------------------------------  # Multifocal pneumonia, organism unspecified  Acute Respiratory Failure with " Hypoxia/Hypercapnia considered and ruled out   - CT scan showed pulmonary infiltrates   - COVID 19/influenza/RSV negative  - Unable to provide sputum specimen  - Abx as detailed above    ---------------------------------------------------------------------------------------  # Recurrent pericardial effusion  # Paroxysmal atrial fibrillation   # NSTEMI  - Troponin did peak at 346 on 10/05 (thought to be due to demand because of shock and EKG did not had any ST elevation)  - ECHO 10/10 with recurrent pericardial effusion slightly larger than seen on ECHO 10/8 s/p pericardiocentesis 10/11 with 560cc removed   - s/p RHC 10/11 which showed normal right and left sided filling pressures, mild elevated pulmonary pressures and portal hypertension with hepatic vein pressure gradient of 10mmHg   - Drain removed on 10/12  - Repeat ECHO 10/17 with no recurrent pericardial effusion, LVEF >70%  - If patient requires repeat pericardiocentesis in future then send for AFB/fungal culture, 16 S and 28 S  - Cardiology had signed off at previous hospital    Plan:  - Continue PTA Eliquis 5mg BID  - On telemetry currently    --------------------------------------------------------------------------------------------  # Pulmonary embolism  - Noted on CT scan   - Treated with heparin gtt     #Previous embolic stroke in 11/2022    ---------------------------------------------------------------------------------------------  # ESRD on HD MWF  # S/p left AV fistula pseudoaneurysm repair, 8/2023  # LUE AV fistula infiltrated on 10/6  # On kidney transplant list  - Nephrology consulted  - Dialysis per nephrology  - Continue renal multivitamin  - Continue PTA sevelamer 800mg QID    --------------------------------------------------------------------------------------------  # Severe sepsis with septic shock - due to multifactorial pneumonia -resolved  # Chronic hypotension, on midodrine  - Is normally on midodrine for chronic hypotension, was  adjusted multiple times during this hospitalization. Now only on midodrine for dialysis.    - Off pressors since 10/7/2023  - Random cortisol level checked on 10/13, which was normal  - S/p 25 g of 25% albumin on 10/14  -------------------------------------------------------------------------------------------------  # Pain at multiple locations  Has pain in left upper extremity due to infiltration of AV fistula, pain in right upper extremity due to thrombophlebitis and frequent lab draws.  - Tylenol 650mg Q8h PRN (keep daily < 2g) for pain  - Oxycodone 5mg PO Q6 PRN for moderate pain  - Dilaudid 2mg PO Q6h PRN for severe pain     # Generalized weakness  - PT and OT ordered    # Probable CHRISTIAN  Per patient's wife, he had a sleep study about 10 years ago but does not have any CPAP or BiPAP at home.  - CPAP ordered for sleep  - Should undergo sleep study as outpatient after discharge    # Anemia of chronic disease  Hgb baseline between 8-9  - Hemoglobin is stable yesterday at 8.3  - Will follow     # Chronic Thrombocytopenia  -Plt yesterday was 175    # Restless leg syndrome  -Continue PTA ropinirole     # Seizure disorder  - Continue PTA Vimpat     # GERD  -Continue PTA PPI           Diet: Combination Diet Regular Diet Adult    DVT Prophylaxis: DOAC  Nixon Catheter: Not present  Fluids: None  Lines: PRESENT      Hemodialysis Vascular Access Right Subclavian-Site Assessment: WDL  Hemodialysis Vascular Access Arteriovenous fistula Left Forearm-Site Assessment: Thrill present;Bruit present      Cardiac Monitoring: ACTIVE order. Indication: Tachyarrhythmias, acute (48 hours)  Code Status: Full Code      Clinically Significant Risk Factors Present on Admission         # Hyponatremia: Lowest Na = 125 mmol/L in last 2 days, will monitor as appropriate      # Hypoalbuminemia: Lowest albumin = 2.8 g/dL at 10/22/2023  9:59 AM, will monitor as appropriate  # Drug Induced Coagulation Defect: home medication list includes an  anticoagulant medication    # Hypertension: Noted on problem list   # Non-Invasive mechanical ventilation: current O2 Device: BiPAP/CPAP  # Acute hypoxic respiratory failure: continue supplemental O2 as needed     # Overweight: Estimated body mass index is 25.89 kg/m  as calculated from the following:    Height as of 10/13/23: 1.829 m (6').    Weight as of this encounter: 86.6 kg (190 lb 14.7 oz).              Disposition Plan      Expected Discharge Date: 10/25/2023                  The patient will be discussed with the AM attending.      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 s/p liver transplant  in 2016 now with progressive cirrhosis (liver bx 12/2022) with evidence of portal hypertension and ascites, paroxysmal atrial fibrillation on Eliquis, DM type 2, history of CVA, hypertension, CHRISTIAN on BiPAP, chronic pericardial effusion, ESRD on dialysis who presented to [facility] ED on 10/5/23 with chest discomfort and palpitations, admitted with severe sepsis with septic shock requiring pressors secondary to multifactorial pneumonia, atrial fibrillation with aberrancy s/p 2 failed attempts at cardioversion, worsening pericardial effusion on ECHO s/p pericardiocentesis 10/11, and PE started on IV heparin. 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 Has required multiple taps with subsequent chylous ascites findings despite tailored diet.    Patient states that  he has had a headache since he was admitted. He also reports some shortness of breath, but attributes this to needing another tap. Denies dizziness or blurred vision. Denies cough or congestion. Denies chest pain. Denies nausea or vomiting.     He does report L arm pain; LUE AVF infiltrated and patient also has thrombophlebitis that have been causing him pain throughout his hospitalization.    He is on dialysis MWF.      Past Medical History    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    Cancer (H) 2023    Squamous Cell Cancer- Since resolved    Cirrhosis of liver with ascites (H) 02/11/2016    Coagulopathy (H24)     Critical illness myopathy     Dialysis patient (H24)     DIALYSIS 3X/ WEEK    Encephalopathy     ESRD (end stage renal disease) on dialysis (H)     Gastroesophageal reflux disease     H/O alcohol abuse     History of blood transfusion     Hyperammonemia (H24)     Hyperlipidemia     Hypertension     Patients states that HTN has been resolved    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    Insomnia     Lactic acidosis 11/12/2022    Liver replaced by transplant (H) 09/20/2016    NAFLD (nonalcoholic fatty liver disease) 02/11/2016    CHRISTIAN on CPAP     Parainfluenza type 1 infection 11/19/2022    Parapneumonic effusion     Pleural effusion     Pneumothorax on left 12/16/2022    Respiratory acidosis 11/12/2022    Respiratory arrest (H) 11/12/2022    Restless legs syndrome (RLS)     SBP (spontaneous bacterial peritonitis) (H)     MNGI    Seizures (H) 12/2022    Sepsis due to Streptococcus pneumoniae with acute hypoxic respiratory failure (H) 11/19/2022    Stroke, embolic (H) 11/20/2022    Sudden cardiac arrest (H) 12/29/2022    PEA- 2 min of CPR     Tubular adenoma 10/2019    Large cecal adenoma- due for surveillance colonoscopy in 3 years (10/2022)       Past Surgical History   Past Surgical History:   Procedure Laterality Date    APPENDECTOMY      BENCH LIVER N/A 2016    Procedure: BENCH LIVER;  Surgeon: Enoc Crews MD;  Location: [facility] OR    BRONCHOSCOPY FLEXIBLE AND RIGID N/A 2022    Procedure: BRONCHOSCOPY;  Surgeon: Alena Valenzuela MD;  Location:  GI    COLONOSCOPY  2013    repeat in 2018    COLONOSCOPY N/A 10/04/2019    Procedure: COLONOSCOPY, WITH POLYPECTOMY AND BIOPSY;  Surgeon: Go Chong MD;  Location: [facility] OR    CREATE FISTULA ARTERIOVENOUS UPPER EXTREMITY Left 2023    Procedure: LEFT UPPER EXTREMITY ARTERIOVENOUS FISTULA CREATION. LIGATION OF COMPETING VASCULAR BRANCHES- LEFT;  Surgeon: Deejay Mcneil MD;  Location:  OR    CV PERICARDIOCENTESIS N/A 2023    Procedure: Pericardiocentesis;  Surgeon: Barry Mckeon MD;  Location:  [facility]ATH [facility]    HERNIA REPAIR      IR CHEST TUBE PLACEMENT NON-TUNNELED RIGHT  2022    IR CVC TUNNEL PLACEMENT > 5 YRS OF AGE  2022    IR DIALYSIS FISTULOGRAM LEFT  2023    IR GASTROSTOMY TUBE CHANGE  2023    IR GASTROSTOMY TUBE PERCUTANEOUS PLCMNT  2022    IR PARACENTESIS  2022    IR PARACENTESIS  2022    IR THORACENTESIS  2022    IR THORACENTESIS  2020    IR THORACENTESIS  08/10/2020    IR TRANSCATHETER BIOPSY  2022    REVISION FISTULA ARTERIOVENOUS UPPER EXTREMITY Left 8/15/2023    Procedure: 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 2; OUTFLOW REVISION CEPHALIC TO BRACHIAL VEIN;  Surgeon: Deejay Mcneil MD;  Location:  OR    TRACHEOSTOMY N/A 2022    Procedure: TRACHEOSTOMY;  Surgeon: Nilesh Jackson MD;  Location:  OR    TRANSPLANT LIVER RECIPIENT  DONOR N/A 2016    Procedure: TRANSPLANT LIVER RECIPIENT  DONOR;  Surgeon:  Enoc Crews MD;  Location: [facility] OR       Prior to Admission Medications   Prior to Admission Medications   Prescriptions Last Dose Informant Patient Reported? Taking?   Lacosamide (VIMPAT) 100 MG TABS tablet 10/22/2023 Spouse/Significant Other No Yes   Sig: Take 1 tablet (100 mg) by mouth every evening   acetaminophen (TYLENOL) 325 MG tablet Past Month Spouse/Significant Other No Yes   Sig: Take 2 tablets (650 mg) by mouth every 4 hours as needed for other (For optimal non-opioid multimodal pain management to improve pain control.)   apixaban ANTICOAGULANT (ELIQUIS) 5 MG tablet 10/22/2023 Spouse/Significant Other No Yes   Sig: Take 1 tablet (5 mg) by mouth 2 times daily for 90 days   gabapentin (NEURONTIN) 100 MG capsule 10/22/2023 Spouse/Significant Other No Yes   Sig: Take 1 capsule (100 mg) by mouth daily   hydrOXYzine (ATARAX) 25 MG tablet 10/22/2023 Spouse/Significant Other No Yes   Sig: Take 1 tablet (25 mg) by mouth 3 times daily as needed for itching   lacosamide (VIMPAT) 50 MG TABS tablet 10/22/2023 Spouse/Significant Other Yes Yes   Sig: Take 50 mg by mouth every morning   melatonin 3 MG tablet 10/22/2023 Spouse/Significant Other No Yes   Sig: Take 1 tablet (3 mg) by mouth At Bedtime   midodrine (PROAMATINE) 10 MG tablet 10/22/2023  Yes Yes   Sig: Take 10 mg by mouth 3 times daily   midodrine (PROAMATINE) 10 MG tablet 10/22/2023  Yes Yes   Sig: Take 10-20 mg by mouth 4 times daily as needed (On dialysis days) On dialysis days - patient takes 1 tablet one hour before dialysis, 1 tablet when he gets there, 1 tablet during dialysis and 1-2 tablets after dialysis. Max 5 tablets   multivitamin RENAL (TRIPHROCAPS) 1 capsule capsule 10/22/2023 Spouse/Significant Other No Yes   Sig: Take 1 capsule by mouth daily   oxyCODONE (ROXICODONE) 5 MG tablet 10/22/2023 Spouse/Significant Other No Yes   Sig: Take 1 tablet (5 mg) by mouth every 4 hours as needed for moderate pain   rOPINIRole (REQUIP) 0.5 MG tablet  Past Month Spouse/Significant Other Yes Yes   Sig: Take 0.5 mg by mouth 2 times daily as needed (restless legs)   sevelamer carbonate (RENVELA) 800 MG tablet 10/22/2023 Spouse/Significant Other Yes Yes   Sig: Take 800 mg by mouth 4 times daily With meals and snacks   tacrolimus (GENERIC EQUIVALENT) 1 MG capsule 10/22/2023 Spouse/Significant Other No Yes   Sig: Take 1 capsule (1 mg) by mouth every 12 hours      Facility-Administered Medications: None        Review of Systems    The 10 point Review of Systems is negative other than noted in the HPI or here.      Physical Exam   Vital Signs: Temp: 98  F (36.7  C) Temp src: Oral BP: 97/63 Pulse: 76   Resp: 13 SpO2: 98 % O2 Device: BiPAP/CPAP    Weight: 190 lbs 14.69 oz    General Appearance: Laying comfortably in bed. No acute distress.  Respiratory: Clear to auscultation bilaterally. No increased WOB.  Cardiovascular: Regular rate and rhythm. No murmur heard.  GI: Distended abdomen, generally soft. Non-tender to palpation. Difficult to assess hepatosplenomegaly due to distension. Multiple scars present from previous surgeries and taps.  Skin: No rash. Slightly jaundiced.      Data     I have personally reviewed the following data over the past 24 hrs:    6.0  \   9.0 (L)   / 166     125 (L) 91 (L) 74.8 (H) /  126 (H)   4.7 23 6.90 (H) \     ALT: 6 AST: 13 AP: 164 (H) TBILI: 0.3   ALB: 2.8 (L) TOT PROTEIN: 5.7 (L) LIPASE: N/A       Imaging results reviewed over the past 24 hrs:   No results found for this or any previous visit (from the past 24 hour(s)).

## 2023-10-23 NOTE — PHARMACY-ADMISSION MEDICATION HISTORY
Pharmacist Admission Medication History    Admission medication history is complete. The information provided in this note is only as accurate as the sources available at the time of the update.    Information Source(s): Medication history completed by pharmacist at [facility] on 10/5/23. Please see that note for additional details (including further information on midodrine dosing).    Medication History Completed By: Darline [facility] 10/23/2023 12:43 PM    PTA Med List   Medication Sig Last Dose    acetaminophen (TYLENOL) 325 MG tablet Take 2 tablets (650 mg) by mouth every 4 hours as needed for other (For optimal non-opioid multimodal pain management to improve pain control.) Past Month    apixaban ANTICOAGULANT (ELIQUIS) 5 MG tablet Take 1 tablet (5 mg) by mouth 2 times daily for 90 days 10/22/2023    gabapentin (NEURONTIN) 100 MG capsule Take 1 capsule (100 mg) by mouth daily 10/22/2023    hydrOXYzine (ATARAX) 25 MG tablet Take 1 tablet (25 mg) by mouth 3 times daily as needed for itching 10/22/2023    Lacosamide (VIMPAT) 100 MG TABS tablet Take 1 tablet (100 mg) by mouth every evening 10/22/2023    lacosamide (VIMPAT) 50 MG TABS tablet Take 50 mg by mouth every morning 10/22/2023    melatonin 3 MG tablet Take 1 tablet (3 mg) by mouth At Bedtime 10/22/2023    midodrine (PROAMATINE) 10 MG tablet Take 10 mg by mouth 3 times daily 10/22/2023    midodrine (PROAMATINE) 10 MG tablet Take 10-20 mg by mouth 4 times daily as needed (On dialysis days) On dialysis days - patient takes 1 tablet one hour before dialysis, 1 tablet when he gets there, 1 tablet during dialysis and 1-2 tablets after dialysis. Max 5 tablets 10/22/2023    multivitamin RENAL (TRIPHROCAPS) 1 capsule capsule Take 1 capsule by mouth daily 10/22/2023    oxyCODONE (ROXICODONE) 5 MG tablet Take 1 tablet (5 mg) by mouth every 4 hours as needed for moderate pain 10/22/2023    rOPINIRole (REQUIP) 0.5 MG tablet Take 0.5 mg by mouth 2 times  daily as needed (restless legs) Past Month    sevelamer carbonate (RENVELA) 800 MG tablet Take 800 mg by mouth 4 times daily With meals and snacks 10/22/2023    tacrolimus (GENERIC EQUIVALENT) 1 MG capsule Take 1 capsule (1 mg) by mouth every 12 hours 10/22/2023

## 2023-10-23 NOTE — PROGRESS NOTES
[id number]    Neuro: A&Ox4.   Cardiac: SR, 1st degree AV block. VSS. SBP 90s-100s.   Respiratory: Sating >95% on RA. Spot check pulse ox.   GI/: Anuric - iHD. BM X1. Ascites, abdominal tenderness.  Diet/appetite: Tolerating very low fat, low Na diet. 1L/day fluid restriction. Eating well.  Activity:  Independent - up to bathroom.  Pain: C/o intermittent HA, and constant L upper arm fistula pain radiating across chest - not new, team aware. Oxy 5 mg given q6hr.  Skin: No new deficits noted. C/o itchiness r/t kidney dysfunction. Lotion from home applied per wife.  LDA's: R CVC (HD), LUE fistula, R PIV (SL)    Plan: Continue with POC. Notify primary team with changes. Paracentesis TBD.     Pertinent shift events: HD run - 2L removed.

## 2023-10-24 ENCOUNTER — APPOINTMENT (OUTPATIENT)
Dept: ULTRASOUND IMAGING | Facility: CLINIC | Age: 58
DRG: 280 | End: 2023-10-24
Attending: STUDENT IN AN ORGANIZED HEALTH CARE EDUCATION/TRAINING PROGRAM
Payer: COMMERCIAL

## 2023-10-24 LAB
ABSOLUTE NEUTROPHILS, BODY FLUID: 634.5 /UL
ALBUMIN BODY FLUID SOURCE: NORMAL
ALBUMIN FLD-MCNC: 1.1 G/DL
ALBUMIN SERPL BCG-MCNC: 3.1 G/DL (ref 3.5–5.2)
ALP SERPL-CCNC: 189 U/L (ref 40–129)
ALT SERPL W P-5'-P-CCNC: <5 U/L (ref 0–70)
ANION GAP SERPL CALCULATED.3IONS-SCNC: 12 MMOL/L (ref 7–15)
APPEARANCE FLD: ABNORMAL
AST SERPL W P-5'-P-CCNC: 21 U/L (ref 0–45)
BILIRUB SERPL-MCNC: 0.4 MG/DL
BUN SERPL-MCNC: 47.6 MG/DL (ref 8–23)
CALCIUM SERPL-MCNC: 8.5 MG/DL (ref 8.6–10)
CELL COUNT BODY FLUID SOURCE: ABNORMAL
CHLORIDE SERPL-SCNC: 94 MMOL/L (ref 98–107)
COLOR FLD: ABNORMAL
CREAT SERPL-MCNC: 5.41 MG/DL (ref 0.67–1.17)
CRP SERPL-MCNC: 31.4 MG/L
DEPRECATED HCO3 PLAS-SCNC: 21 MMOL/L (ref 22–29)
EGFRCR SERPLBLD CKD-EPI 2021: 11 ML/MIN/1.73M2
ERYTHROCYTE [DISTWIDTH] IN BLOOD BY AUTOMATED COUNT: 17.6 % (ref 10–15)
FERRITIN SERPL-MCNC: 423 NG/ML (ref 31–409)
FIBRINOGEN PPP-MCNC: 442 MG/DL (ref 170–490)
GLUCOSE BODY FLUID SOURCE: NORMAL
GLUCOSE FLD-MCNC: 130 MG/DL
GLUCOSE SERPL-MCNC: 96 MG/DL (ref 70–99)
GRAM STAIN RESULT: NORMAL
GRAM STAIN RESULT: NORMAL
HCT VFR BLD AUTO: 27.8 % (ref 40–53)
HGB BLD-MCNC: 8.6 G/DL (ref 13.3–17.7)
IRON BINDING CAPACITY (ROCHE): 190 UG/DL (ref 240–430)
IRON SATN MFR SERPL: 20 % (ref 15–46)
IRON SERPL-MCNC: 38 UG/DL (ref 61–157)
LDH SERPL L TO P-CCNC: 172 U/L (ref 0–250)
LYMPHOCYTES NFR FLD MANUAL: 20 %
MCH RBC QN AUTO: 31.2 PG (ref 26.5–33)
MCHC RBC AUTO-ENTMCNC: 30.9 G/DL (ref 31.5–36.5)
MCV RBC AUTO: 101 FL (ref 78–100)
MONOS+MACROS NFR FLD MANUAL: ABNORMAL %
NEUTS BAND NFR FLD MANUAL: 61 %
OTHER CELLS FLD MANUAL: 19 %
PLATELET # BLD AUTO: 151 10E3/UL (ref 150–450)
POTASSIUM SERPL-SCNC: 4.7 MMOL/L (ref 3.4–5.3)
PROT FLD-MCNC: 2.3 G/DL
PROT FLD-MCNC: 2.4 G/DL
PROT SERPL-MCNC: 6 G/DL (ref 6.4–8.3)
PROTEIN BODY FLUID SOURCE: NORMAL
PROTEIN BODY FLUID SOURCE: NORMAL
RBC # BLD AUTO: 2.76 10E6/UL (ref 4.4–5.9)
SODIUM SERPL-SCNC: 127 MMOL/L (ref 135–145)
TACROLIMUS BLD-MCNC: 3.7 UG/L (ref 5–15)
TME LAST DOSE: ABNORMAL H
TME LAST DOSE: ABNORMAL H
TRIGL FLD-MCNC: 130 MG/DL
TRIGLYCERIDE BODY FLUID SOURCE: NORMAL
WBC # BLD AUTO: 5.2 10E3/UL (ref 4–11)
WBC # FLD AUTO: 1035 /UL

## 2023-10-24 PROCEDURE — 99233 SBSQ HOSP IP/OBS HIGH 50: CPT | Performed by: NURSE PRACTITIONER

## 2023-10-24 PROCEDURE — 76882 US LMTD JT/FCL EVL NVASC XTR: CPT | Mod: 26 | Performed by: RADIOLOGY

## 2023-10-24 PROCEDURE — 250N000011 HC RX IP 250 OP 636

## 2023-10-24 PROCEDURE — 85384 FIBRINOGEN ACTIVITY: CPT | Performed by: PEDIATRICS

## 2023-10-24 PROCEDURE — 84157 ASSAY OF PROTEIN OTHER: CPT | Performed by: INTERNAL MEDICINE

## 2023-10-24 PROCEDURE — 80053 COMPREHEN METABOLIC PANEL: CPT | Performed by: STUDENT IN AN ORGANIZED HEALTH CARE EDUCATION/TRAINING PROGRAM

## 2023-10-24 PROCEDURE — 85027 COMPLETE CBC AUTOMATED: CPT | Performed by: STUDENT IN AN ORGANIZED HEALTH CARE EDUCATION/TRAINING PROGRAM

## 2023-10-24 PROCEDURE — 83615 LACTATE (LD) (LDH) ENZYME: CPT | Performed by: INTERNAL MEDICINE

## 2023-10-24 PROCEDURE — 999N000128 HC STATISTIC PERIPHERAL IV START W/O US GUIDANCE

## 2023-10-24 PROCEDURE — 250N000012 HC RX MED GY IP 250 OP 636 PS 637

## 2023-10-24 PROCEDURE — 87205 SMEAR GRAM STAIN: CPT | Performed by: STUDENT IN AN ORGANIZED HEALTH CARE EDUCATION/TRAINING PROGRAM

## 2023-10-24 PROCEDURE — 250N000013 HC RX MED GY IP 250 OP 250 PS 637: Performed by: HOSPITALIST

## 2023-10-24 PROCEDURE — 250N000013 HC RX MED GY IP 250 OP 250 PS 637: Performed by: STUDENT IN AN ORGANIZED HEALTH CARE EDUCATION/TRAINING PROGRAM

## 2023-10-24 PROCEDURE — 49083 ABD PARACENTESIS W/IMAGING: CPT | Performed by: PEDIATRICS

## 2023-10-24 PROCEDURE — 87070 CULTURE OTHR SPECIMN AEROBIC: CPT | Performed by: STUDENT IN AN ORGANIZED HEALTH CARE EDUCATION/TRAINING PROGRAM

## 2023-10-24 PROCEDURE — 250N000013 HC RX MED GY IP 250 OP 250 PS 637

## 2023-10-24 PROCEDURE — 250N000012 HC RX MED GY IP 250 OP 636 PS 637: Performed by: HOSPITALIST

## 2023-10-24 PROCEDURE — 82042 OTHER SOURCE ALBUMIN QUAN EA: CPT | Performed by: STUDENT IN AN ORGANIZED HEALTH CARE EDUCATION/TRAINING PROGRAM

## 2023-10-24 PROCEDURE — 83550 IRON BINDING TEST: CPT | Performed by: STUDENT IN AN ORGANIZED HEALTH CARE EDUCATION/TRAINING PROGRAM

## 2023-10-24 PROCEDURE — 99233 SBSQ HOSP IP/OBS HIGH 50: CPT | Mod: 25 | Performed by: STUDENT IN AN ORGANIZED HEALTH CARE EDUCATION/TRAINING PROGRAM

## 2023-10-24 PROCEDURE — 87070 CULTURE OTHR SPECIMN AEROBIC: CPT | Performed by: INTERNAL MEDICINE

## 2023-10-24 PROCEDURE — 36415 COLL VENOUS BLD VENIPUNCTURE: CPT

## 2023-10-24 PROCEDURE — 82945 GLUCOSE OTHER FLUID: CPT | Performed by: INTERNAL MEDICINE

## 2023-10-24 PROCEDURE — 76882 US LMTD JT/FCL EVL NVASC XTR: CPT | Mod: LT

## 2023-10-24 PROCEDURE — 99232 SBSQ HOSP IP/OBS MODERATE 35: CPT | Performed by: INTERNAL MEDICINE

## 2023-10-24 PROCEDURE — 999N000157 HC STATISTIC RCP TIME EA 10 MIN

## 2023-10-24 PROCEDURE — P9047 ALBUMIN (HUMAN), 25%, 50ML: HCPCS

## 2023-10-24 PROCEDURE — 120N000003 HC R&B IMCU UMMC

## 2023-10-24 PROCEDURE — 87075 CULTR BACTERIA EXCEPT BLOOD: CPT | Performed by: INTERNAL MEDICINE

## 2023-10-24 PROCEDURE — 84157 ASSAY OF PROTEIN OTHER: CPT | Performed by: STUDENT IN AN ORGANIZED HEALTH CARE EDUCATION/TRAINING PROGRAM

## 2023-10-24 PROCEDURE — 87102 FUNGUS ISOLATION CULTURE: CPT | Performed by: STUDENT IN AN ORGANIZED HEALTH CARE EDUCATION/TRAINING PROGRAM

## 2023-10-24 PROCEDURE — 84478 ASSAY OF TRIGLYCERIDES: CPT | Performed by: STUDENT IN AN ORGANIZED HEALTH CARE EDUCATION/TRAINING PROGRAM

## 2023-10-24 PROCEDURE — 89051 BODY FLUID CELL COUNT: CPT | Performed by: STUDENT IN AN ORGANIZED HEALTH CARE EDUCATION/TRAINING PROGRAM

## 2023-10-24 PROCEDURE — 0W9G3ZZ DRAINAGE OF PERITONEAL CAVITY, PERCUTANEOUS APPROACH: ICD-10-PCS | Performed by: PEDIATRICS

## 2023-10-24 PROCEDURE — 80197 ASSAY OF TACROLIMUS: CPT

## 2023-10-24 PROCEDURE — 86140 C-REACTIVE PROTEIN: CPT

## 2023-10-24 PROCEDURE — 82728 ASSAY OF FERRITIN: CPT | Performed by: STUDENT IN AN ORGANIZED HEALTH CARE EDUCATION/TRAINING PROGRAM

## 2023-10-24 PROCEDURE — 36415 COLL VENOUS BLD VENIPUNCTURE: CPT | Performed by: PEDIATRICS

## 2023-10-24 RX ORDER — HYDROXYZINE HYDROCHLORIDE 25 MG/1
25 TABLET, FILM COATED ORAL 3 TIMES DAILY PRN
Status: DISCONTINUED | OUTPATIENT
Start: 2023-10-24 | End: 2023-11-01 | Stop reason: HOSPADM

## 2023-10-24 RX ORDER — MIDODRINE HYDROCHLORIDE 5 MG/1
10-20 TABLET ORAL 4 TIMES DAILY PRN
Status: DISCONTINUED | OUTPATIENT
Start: 2023-10-24 | End: 2023-10-29

## 2023-10-24 RX ORDER — ALBUMIN (HUMAN) 12.5 G/50ML
25 SOLUTION INTRAVENOUS ONCE
Status: COMPLETED | OUTPATIENT
Start: 2023-10-24 | End: 2023-10-24

## 2023-10-24 RX ORDER — PRAMOXINE HYDROCHLORIDE 10 MG/ML
LOTION TOPICAL 3 TIMES DAILY
Status: DISCONTINUED | OUTPATIENT
Start: 2023-10-24 | End: 2023-10-24

## 2023-10-24 RX ORDER — TACROLIMUS 0.5 MG/1
0.5 CAPSULE ORAL
Status: DISCONTINUED | OUTPATIENT
Start: 2023-10-24 | End: 2023-11-01 | Stop reason: HOSPADM

## 2023-10-24 RX ORDER — MIDODRINE HYDROCHLORIDE 5 MG/1
10-20 TABLET ORAL 4 TIMES DAILY PRN
Status: DISCONTINUED | OUTPATIENT
Start: 2023-10-24 | End: 2023-11-01 | Stop reason: HOSPADM

## 2023-10-24 RX ADMIN — PANTOPRAZOLE SODIUM 40 MG: 40 TABLET, DELAYED RELEASE ORAL at 08:03

## 2023-10-24 RX ADMIN — OXYCODONE HYDROCHLORIDE 5 MG: 5 TABLET ORAL at 09:31

## 2023-10-24 RX ADMIN — OXYCODONE HYDROCHLORIDE 5 MG: 5 TABLET ORAL at 15:42

## 2023-10-24 RX ADMIN — SEVELAMER CARBONATE 800 MG: 800 TABLET, FILM COATED ORAL at 17:11

## 2023-10-24 RX ADMIN — CEFEPIME HYDROCHLORIDE 1 G: 1 INJECTION, POWDER, FOR SOLUTION INTRAMUSCULAR; INTRAVENOUS at 12:13

## 2023-10-24 RX ADMIN — APIXABAN 5 MG: 5 TABLET, FILM COATED ORAL at 20:12

## 2023-10-24 RX ADMIN — HYDROXYZINE HYDROCHLORIDE 25 MG: 25 TABLET ORAL at 21:03

## 2023-10-24 RX ADMIN — TACROLIMUS 0.5 MG: 0.5 CAPSULE ORAL at 17:11

## 2023-10-24 RX ADMIN — ALBUMIN HUMAN 25 G: 0.25 SOLUTION INTRAVENOUS at 13:42

## 2023-10-24 RX ADMIN — OXYCODONE HYDROCHLORIDE 5 MG: 5 TABLET ORAL at 03:08

## 2023-10-24 RX ADMIN — MIDODRINE HYDROCHLORIDE 10 MG: 5 TABLET ORAL at 15:45

## 2023-10-24 RX ADMIN — SEVELAMER CARBONATE 800 MG: 800 TABLET, FILM COATED ORAL at 08:03

## 2023-10-24 RX ADMIN — SEVELAMER CARBONATE 800 MG: 800 TABLET, FILM COATED ORAL at 12:13

## 2023-10-24 RX ADMIN — GABAPENTIN 300 MG: 300 CAPSULE ORAL at 21:03

## 2023-10-24 RX ADMIN — APIXABAN 5 MG: 5 TABLET, FILM COATED ORAL at 08:03

## 2023-10-24 RX ADMIN — LACOSAMIDE 50 MG: 50 TABLET, FILM COATED ORAL at 08:03

## 2023-10-24 RX ADMIN — Medication 1 CAPSULE: at 08:03

## 2023-10-24 RX ADMIN — TACROLIMUS 0.5 MG: 0.5 CAPSULE ORAL at 05:35

## 2023-10-24 RX ADMIN — MIDODRINE HYDROCHLORIDE 10 MG: 5 TABLET ORAL at 20:12

## 2023-10-24 RX ADMIN — LACOSAMIDE 100 MG: 50 TABLET, FILM COATED ORAL at 20:12

## 2023-10-24 RX ADMIN — OXYCODONE HYDROCHLORIDE 5 MG: 5 TABLET ORAL at 21:41

## 2023-10-24 ASSESSMENT — ACTIVITIES OF DAILY LIVING (ADL)
ADLS_ACUITY_SCORE: 26
ADLS_ACUITY_SCORE: 22
ADLS_ACUITY_SCORE: 22
ADLS_ACUITY_SCORE: 26
ADLS_ACUITY_SCORE: 22
ADLS_ACUITY_SCORE: 26
ADLS_ACUITY_SCORE: 22
ADLS_ACUITY_SCORE: 22

## 2023-10-24 NOTE — PROCEDURES
[facility]  CAPS PROCEDURE NOTE  Date of Admission:  10/23/2023  Consult Requested by: Medicine  Reason for Consult: diagnostic evaluation of ascites and management of symptomatic ascites    Indication/HPI:     59 yo M with PMH of OLT now with chylous ascites and SBP    Pre-Procedure Diagnosis: Ascites  Post-Procedure Diagnosis: Ascites    Risk Assessment: Average risk, Technically straightforward; patient's anticoagulation has been held according to guidelines based on the agent and platelets and coags are within guidelines    Procedure Outcome:  Diagnostic paracentesis performed and Therapeutic paracentesis performed with 2.7 liters of ascites removed.   [facility] bedside during entire procedure  See additional procedure details below.    The primary covering service should follow up and address any lab results as appropriate.    Stephen Burr MD  [facility]  Securely message with Vocera (more info)  Text page via [facility] Paging/Directory   See signed in provider for up to date coverage information      [facility]    Paracentesis    Date/Time: 10/24/2023 12:08 PM    Performed by: Stephen Burr MD  Authorized by: Stephen Burr MD    PRE-PROCEDURE DETAILS  Initial or subsequent exam: subsequent  Procedure purpose: therapeutic and diagnostic  Indications: abdominal discomfort secondary to ascites and suspected peritonitis        UNIVERSAL PROTOCOL   Site Marked: Yes  Prior Images Obtained and Reviewed:  Yes  Required items: Required blood products, implants, devices and special equipment available    Patient identity confirmed:  Verbally with patient, arm band and provided demographic data  NA - No sedation, light sedation, or local anesthesia  Confirmation Checklist:  Relevant allergies, patient's identity using two indicators, correct equipment/implants were available  and procedure was appropriate and matched the consent or emergent situation  Time out: Immediately prior to the procedure a time out was called    Universal Protocol: the Joint Commission Universal Protocol was followed    Preparation: Patient was prepped and draped in usual sterile fashion    ESBL (mL):  0     ANESTHESIA    Anesthesia:  Local infiltration  Local Anesthetic:  Lidocaine 1% without epinephrine  Anesthetic Total (mL):  6      SEDATION    Patient Sedated: No    POST PROCEDURE DETAILS  Needle gauge: 5 Fr Yueh.  Ultrasound guidance: yes  Fluid characteristics: yellow.  Dressing: bandage.        PROCEDURE  Describe Procedure: Consult and Procedure Service Note    The risks and benefits of the procedure were explained to patient who expressed understanding and opted to proceed. Consent was obtained and placed in the chart.  A time out was performed. An area of ascites was located with ultrasound and marked in the RLQ; the area was prepped and draped in the usual sterile fashion. A pocklet of ascites was located using ultrasound and 1% lidocaine was instilled a 25 g needle and using an 11 blade scapel a small skin incision was made.   Subsequently a 5 Fr Yeuh catheter guided to fluid pocket under real time guidance; aspiration yeilded 2.700 ml yellow fluid obtained on the 1st attempt.   The specimen(s) left secured in lab bag at bedside, to be sent for analysis; RN updated, and the area dressed per CAPS routine                     Patient Tolerance:  Patient tolerated the procedure well with no immediate complications  Length of time physician/provider present for 1:1 monitoring during sedation: 0   Thank you for consulting the CAPS team. Please contact the Consult and Procedure Service if any concerns or complications arise.         POC US GUIDE FOR PARACENTESIS     Impression  US Indication: decompensated liver disease and abdominal distension    FINDINGS:  Limited abdominal ultrasound was performed and  demonstrated an adequate fluid collection on the RLQ of the abdomen. Doppler of the skin demonstrated an area at this site without significant vasculature. A paracentesis at this site was subsequently performed.

## 2023-10-24 NOTE — PLAN OF CARE
Goal Outcome Evaluation:    BP 93/58 (BP Location: Right arm)   Pulse 76   Temp 97.8  F (36.6  C) (Oral)   Resp 12   Wt 85.7 kg (188 lb 15 oz)   SpO2 99%   BMI 25.62 kg/m      VSS. SR with first degree AV block. BPs soft but stable. RA. A&O x4. Oxy given x1 for pain in L arm. Tolerating diet and fluid restriction. Anuric. BM x1 per pt. Paracentesis done at bedside. Up independently. Continue to monitor.

## 2023-10-24 NOTE — PROGRESS NOTES
[facility]  Transplant Infectious Disease Progress Note     Patient:  Blanco Osborne, Date of birth 1965, Medical record number [id number]  Date of Visit:  10/24/2023         Assessment and Recommendations:   Recommendations:  - Continue Cefepime 1g q24hrs for continued peritonitis coverage.   - Await pending ID studies from 10/24/2023 paracentesis.    Transplant Infectious Disease will continue to follow with you.      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 Admitted with septic shock 2/2 multifocal pneumonia requiring pressors c/b A fib with aberrancy s/p 2 attempts at cardioversion which failed, worsening pericardial effusion s/p pericardiocentesis 10/11, and PE on heparin. 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 ID consulted for concern of peritonitis.   Infectious Disease issues include:  - Peritonitis by cell count numbers in 10/16/2023 paracentesis of chylous ascites. 10/16/2023 ascites fluid showed 3,190 neutrophils. He was started on ceftriaxone 10/16/2023, with a change to cefepime 10/17/2023. 10/24/2023 paracentesis showing 634 neutrophils, so this is a measure of success in treating culture negative peritonitis.   - Pericardial effusion. S/p pericardiocentesis 10/11 with 2558 WBC/62% neutrophils, cultures negative. Pericardial drain removed 10/12. Quant gold negative, HIV screen negative, EBV DNA PCR <500/log <2.7, CMV DNA PCR not detected.  - Hx of multifocal pneumonia with septic shock. Hypotensive requiring pressor support and ICU admission 10/5. Did have  A fib with 2 failed cardioversions, initiated on amiodarone with improvement to NSR. CT chest with PE and pulmonary infiltrates c/f pneumonia and is now s/p broad spectrum abx as above. No sputum collected or organism identified. Was negative for COVID/influenza/RSV on PCR. Completed course of Zosyn 10/5-12 + 2 days of Augmentin.  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 10/14/2023 check of CMV & EBV DNA neg.   - Immunization status: COVID vaccinated x2, influenza completed for 2023, due for COVID booster, shingrix #2 11/16/2   - Gamma globulin status: unknown  - Isolation status: Good hand hygiene.    Margaux Pickard MD. Pager [phone]         Interval History:   Since Blanco was last seen by me on 10/23/2023, he is doing ok. Tolerating diet and fluid restriction. Paracentesis done at bedside; 2.7 liters of ascites removed.       Transplants:  9/20/2016 (Liver), Postoperative day:  2590.  Coordinator Tamara Khan    Review of Systems:  CONSTITUTIONAL:  BPs soft but stable. No fever.   EYES:   ENT:    RESPIRATORY:  Satting >95% on RA overnight, refused to wear CPAP.    CARDIOVASCULAR:  SR with first degree AV block   GASTROINTESTINAL:  BM x1   GENITOURINARY:  Anuric, had dialysis yesterday  HEME:  no tranfusions given  INTEGUMENT:  no new rash  NEURO: alert and oriented x4          Current Medications & Allergies:      apixaban ANTICOAGULANT  5 mg Oral BID    gabapentin  300 mg Oral At Bedtime    Lacosamide  100 mg Oral QPM    lacosamide  50 mg Oral QAM    multivitamin RENAL  1 capsule Oral Daily    pantoprazole  40  mg Oral QAM AC    sevelamer carbonate  800 mg Oral TID w/meals    tacrolimus  0.5 mg Oral BID IS       No Known Allergies         Physical Exam:   Patient Vitals for the past 24 hrs:   BP Temp Temp src Pulse Resp SpO2 Weight   10/24/23 1214 93/58 -- -- 76 12 99 % --   10/24/23 0717 90/55 97.8  F (36.6  C) Oral 78 16 97 % --   10/24/23 0308 92/61 98.1  F (36.7  C) Oral 76 19 98 % --   10/24/23 0034 -- -- -- -- -- -- 85.7 kg (188 lb 15 oz)   10/23/23 2000 92/54 97.5  F (36.4  C) Oral 84 20 98 % --   10/23/23 1729 95/64 97.8  F (36.6  C) Oral 74 17 98 % --   10/23/23 1658 96/68 -- -- 71 13 100 % --   10/23/23 1656 94/64 -- -- 74 20 99 % --   10/23/23 1645 94/67 -- -- 69 12 98 % --   10/23/23 1632 91/64 -- -- 72 14 99 % --   10/23/23 1630 (!) 87/63 -- -- 68 14 98 % --   10/23/23 1615 103/67 -- -- 77 25 99 % --   10/23/23 1607 93/66 -- -- 70 12 98 % --   10/23/23 1600 (!) 85/56 -- -- 68 16 98 % --   10/23/23 1548 94/63 -- -- 71 19 99 % --   10/23/23 1545 (!) 89/64 -- -- 74 19 97 % --   10/23/23 1530 91/59 -- -- 70 14 98 % --     Ranges for vital signs:  Temp:  [97.5  F (36.4  C)-98.1  F (36.7  C)] 97.8  F (36.6  C)  Pulse:  [68-84] 76  Resp:  [12-25] 12  BP: ()/(54-68) 93/58  SpO2:  [97 %-100 %] 99 %  Vitals:    10/23/23 0100 10/24/23 0034   Weight: 86.6 kg (190 lb 14.7 oz) 85.7 kg (188 lb 15 oz)       Physical Examination:  GENERAL:  well-developed, well-nourished man, resting in bed in no acute distress.  HEAD:  Head is normocephalic, atraumatic   EYES:  Eyes have anicteric sclerae   ENT:  Oropharynx is moist without exudates or ulcers. Tongue is midline  NECK:  Supple.   LUNGS:  Clear to auscultation bilateral.   CARDIOVASCULAR:  Regular rate and rhythm   ABDOMEN:  Normal bowel sounds, soft, moderately distended from ascites, nontender.   SKIN:  No acute rashes. Various ecchymoses. L AV fistula with some erythema overlying distal graft. Thrill present. Tunneled dialysis line in place without any surrounding  erythema or exudate. PIV in place.   NEUROLOGIC:  Grossly nonfocal. Active x4 extremities         Laboratory Data:     Inflammatory Markers    Recent Labs   Lab Test 10/24/23  0537 10/14/23  1042 01/22/23  0528 01/19/23  0627 11/22/22  0025 03/01/16  0648   CRP  --   --   --   --  22.3*  --    CRPI 31.40*  --  138.42*   < >  --   --    ELPIDIO  --  11.7  --   --   --   --    PSA  --   --   --   --   --  0.02    < > = values in this interval not displayed.       Metabolic Studies       Recent Labs   Lab Test 10/24/23  0537 10/23/23  0654 10/11/23  1746 10/11/23  1430 10/05/23  1611 10/05/23  1244 10/05/23  0248 10/05/23  0245 08/16/23  0607 08/10/23  1032 01/22/23  1147 01/22/23  0528   * 121*   < >  --    < >  --    < > 136   < > 141   < > 136   POTASSIUM 4.7 5.5*   < >  --    < >  --    < > 3.8   < > 4.3   < > 3.8   CHLORIDE 94* 87*   < >  --    < >  --   --  90*   < > 95*   < > 94*   CO2 21* 19*   < >  --    < >  --   --  24   < > 32*   < > 29   ANIONGAP 12 15   < >  --    < >  --   --  22*   < > 14   < > 13   BUN 47.6* 87.9*   < >  --    < >  --   --  44.5*   < > 45.6*   < > 33.6*   CR 5.41* 8.03*   < >  --    < >  --   --  5.77*   < > 5.76*   < > 2.68*   GFRESTIMATED 11* 7*   < >  --    < >  --   --  11*   < > 11*   < > 27*   GLC 96 107*   < >  --    < >  --   --  127*   < > 104*   < > 90   A1C  --   --   --   --   --   --   --   --   --  4.7  --   --    KELLY 8.5* 8.8   < >  --    < >  --   --  9.4   < > 9.5   < > 9.1   PHOS  --  3.1  --   --    < >  --   --  5.2*   < >  --    < >  --    MAG  --   --   --   --   --   --   --  2.1  --   --    < >  --    LACT  --   --   --  0.4  --  0.6*   < >  --   --   --    < >  --    PCAL  --   --   --   --   --   --   --   --   --   --   --  1.78*    < > = values in this interval not displayed.       Hepatic Studies    Recent Labs   Lab Test 10/24/23  0537 10/23/23  0654 10/22/23  0959 10/07/23  0431 10/06/23  0533 01/14/23  0615 01/13/23  0553   BILITOTAL 0.4 0.4 0.3   < >  0.6   < >  --    DBIL  --   --   --   --  0.34*   < >  --    ALKPHOS 189* 177* 164*   < > 213*   < >  --    PROTTOTAL 6.0* 5.9* 5.7*   < > 6.6   < > 6.5   ALBUMIN 3.1* 3.2* 2.8*   < > 3.3*   < >  --    AST 21 17 13   < > 12   < >  --    ALT <5 <5 6   < > 10   < >  --      --   --   --   --   --  217    < > = values in this interval not displayed.       Pancreatitis testing    Recent Labs   Lab Test 10/13/23  0850 10/31/17  1037 09/22/16  0741 09/20/16  1259   AMYLASE  --   --  72 86   LIPASE 24  --  63*  --    TRIG 65   < >  --   --     < > = values in this interval not displayed.       Hematology Studies   Recent Labs   Lab Test 10/24/23  0537 10/23/23  0654 10/22/23  1655 10/21/23  0748 10/05/23  0251 10/05/23  0245 11/22/22  0423 11/22/22  0025 11/16/22  0147 11/15/22  1822 11/12/22  1147 08/05/20  1230   WBC 5.2 6.0 6.2 4.6   < > 9.9   < > 47.0*   < > 55.8*   < >  --    31038  --   --   --   --   --   --   --   --   --   --   --  2.8*   ANEU  --   --   --   --   --   --   --  39.0*  --  52.5*   < >  --    ANEUTAUTO  --  3.1  --   --   --  4.0   < >  --   --   --    < >  --    ALYM  --   --   --   --   --   --   --  3.3  --  1.7   < >  --    ALYMPAUTO  --  1.7  --   --   --  4.4   < >  --   --   --    < >  --    MARCELO  --   --   --   --   --   --   --  3.8*  --  0.0   < >  --    AMONOAUTO  --  0.8  --   --   --  1.1   < >  --   --   --    < >  --    AEOS  --   --   --   --   --   --   --  0.0  --  0.0   < >  --    AEOSAUTO  --  0.3  --   --   --  0.3   < >  --   --   --    < >  --    ABSBASO  --  0.1  --   --   --  0.0   < >  --   --   --    < >  --    HGB 8.6* 9.0* 8.3* 9.1*   < > 11.4*   < > 10.5*   < > 11.5*   < >  --    83800  --   --   --   --   --   --   --   --   --   --   --  13.9*   HCT 27.8* 28.0* 26.0* 29.6*   < > 35.2*   < > 32.6*   < > 34.5*   < >  --     166 175 169   < > 146*   < > 229   < > 87*   < >  --    02916  --   --   --   --   --   --   --   --   --   --   --  103*    < > =  values in this interval not displayed.       Clotting Studies    Recent Labs   Lab Test 10/17/23  0633 10/14/23  1042 10/05/23  1244 10/05/23  0245 08/16/23  0734 12/21/22  1315   INR 2.00*  --   --  1.55* 1.29* 1.36*   PTT  --  47*   < > 48*  --  39*    < > = values in this interval not displayed.       Iron Testing    Recent Labs   Lab Test 10/24/23  0537 07/11/23  1057 04/23/23  0812 04/22/23  0821 04/14/23  0725 04/08/23  0859 04/06/23  0808 12/14/22  0412 12/13/22  0427   IRON 38*  --   --   --  60*  --  78   < > 37   *  --   --   --  210*  --   --    < > 138*   IRONSAT 20  --   --   --  29  --   --    < > 27   HOSSEIN 423*  --   --   --  286  --   --    < > 212   *   < >  --   --  97   < >  --    < > 113*   FOLIC  --   --  >40.0*  --   --   --   --    < >  --    B12  --   --   --  989  --   --   --   --   --    RETP  --   --   --   --   --   --   --   --  6.5*   RETICABSCT  --   --   --   --   --   --   --   --  0.124*    < > = values in this interval not displayed.       Arterial Blood Gas Testing    Recent Labs   Lab Test 03/12/23  1117 03/10/23  1457 03/08/23  1640 03/07/23  1657 03/06/23  1253 03/05/23  2059 03/05/23  1605 12/20/22  0719 11/25/22  1947 11/23/22  1107 11/23/22  0442 11/22/22  1839   PH  --   --   --   --   --  7.30*  --  7.43  --  7.34* 7.47* 7.47*   PCO2  --   --   --   --   --  57*  --  40  --  43 32* 33*   PO2  --   --   --   --   --  94  --  118*  --  99 100 104   HCO3  --   --   --   --   --  28  --  27  --  23 23 24   O2PER 0 21 21 25   < > 30   < > 30   < > 30 40 40    < > = values in this interval not displayed.        Thyroid Studies     Recent Labs   Lab Test 03/25/23  1058 03/01/16  0648   TSH 4.89* 1.76   T4 0.92  --        Urine Studies     Recent Labs   Lab Test 11/25/22  1859 10/31/17  1038 09/24/16  1055 09/23/16  1458 03/01/16  0644   URINEPH 5.5 5.0 5.0 7.5* Duplicate request  SEE [id number]    5.0   NITRITE Negative Negative Negative Negative Duplicate request  SEE  [id number]  *  Negative   LEUKEST Small* Negative Moderate* Large* Duplicate request  SEE [id number]  *  Negative   WBCU  --  1 13* 103* 2*   UWBCLM  --   --  Present*  --   --        CSF testing     Recent Labs   Lab Test 12/21/22  1526   CWBC 3   CRBC 58*   CGLU 69   CTP 71*       Medication levels    Recent Labs   Lab Test 10/24/23  0537 11/29/22  0439 11/16/22  0546   VANCOMYCIN  --   --  13.6   TACROL 3.7*   < >  --     < > = values in this interval not displayed.       Body fluid stats    Recent Labs   Lab Test 10/24/23  1059 10/16/23  1511 10/12/23  1113 03/06/23  1503 01/24/23  1615 01/13/23  1259 11/26/22  1123 09/20/16  1901   FCOL Orange* Orange* Yellow   < > Yellow Red*  Yellow   < >  --    FAPR Cloudy* Turbid* Turbid*   < > Turbid* Cloudy*  Turbid*   < >  --    FRBC  --   --   --   --   --  96  60,000  --   --    FWBC 1,035 3,844 719   < > 458 7,918  180   < >  --    FNEU 61 83 1   < > 1 93  16   < >  --    FLYM 20 7 88   < > 61 7  42   < >  --    FMONO  --  10 11   < > 36 40   < >  --    FBAS  --   --   --   --  1  --   --   --    FALB 1.1 1.1 1.0   < > 0.6 0.5   < >  --    FTP 2.4  2.3 2.6 2.4   < > 1.8 3.1  1.4   < >  --    GS  --   --   --   --   --   --   --  No organisms seen  No PMNs seen      < > = values in this interval not displayed.       Microbiology:  Last Culture results   Culture   Date Value Ref Range Status   10/23/2023 No growth after 12 hours  Preliminary   10/23/2023 No growth after 12 hours  Preliminary   10/16/2023 No Growth  Final   10/16/2023 No growth after 7 days  Preliminary   10/12/2023 No Growth  Final   10/11/2023 No Growth  Final   10/10/2023 No Growth  Final   10/06/2023 No Growth  Final   10/06/2023 No anaerobic organisms isolated  Final   10/06/2023 No growth after 17 days  Preliminary   10/05/2023 No Growth  Final   10/05/2023 No Growth  Final   09/29/2023 No Growth  Final   03/06/2023 No Growth  Final   01/31/2023 No Growth  Final   01/31/2023 No Growth  Final    01/24/2023 No Growth  Final   01/24/2023 No anaerobic organisms isolated  Final   01/13/2023 No Growth  Final   01/13/2023 No Growth  Final     Culture Micro   Date Value Ref Range Status   09/28/2016 No VRE isolated  Final   09/23/2016 No growth  Final   09/20/2016   Final    Culture negative for acid fast bacilli  Assayed at [facility] [address]     09/20/2016 No anaerobes isolated  Final   09/20/2016 No growth  Final   09/20/2016 Culture negative after 4 weeks  Final           Last checks of Clostridioides difficile testing  Recent Labs   Lab Test 10/10/23  1711 01/23/23  1656   CDBPCT Negative Negative       Virology:  Coronavirus-19 testing    Recent Labs   Lab Test 10/17/23  2010 10/05/23  0727 01/24/23  1729 11/19/22  1546   [id number] Negative Negative Negative Negative       Respiratory virus (non-coronavirus-19) testing    Recent Labs   Lab Test 10/14/23  1808 10/05/23  0727   IFLUA Not Detected  --    INFZA  --  Negative   FLUAH1 Not Detected  --    LR2720 Not Detected  --    FLUAH3 Not Detected  --    IFLUB Not Detected  --    INFZB  --  Negative   PIV1 Not Detected  --    PIV2 Not Detected  --    PIV3 Not Detected  --    PIV4 Not Detected  --    IRSV  --  Negative   RSVA Not Detected  --    RSVB Not Detected  --    HMPV Not Detected  --    RHINEV Not Detected  --    ADENOV Not Detected  --    CORONA Not Detected  --        CMV viral loads    Recent Labs   Lab Test 10/14/23  2101 02/03/17  1215   CMVQNT Not Detected CMV DNA Not Detected   The GASTON AmpliPrep/GASTON TaqMan CMV Test is an FDA-approved in vitro nucleic   acid amplification test for the quantitation of cytomegalovirus DNA in human   plasma (EDTA plasma) using the GASTON AmpliPrep Instrument for automated viral   nucleic acid extraction and the GASTON TaqMan Analyzer or [employer] TaqMan for   automated Real Time amplification and detection of the viral nucleic acid   target.   Titer results are reported in International  Units/mL (IU/mL using 1st WHO   International standard for Human Cytomegalovirus for Nucleic Acid Amplification   based assays. The conversion factor between CMV DNA copis/mL (as defined by the   Roche GASTON TaqMan CMV test) and International Units is the CMV DNA   concentration in IU/mL x 1.1 copies/IU = CMV DNA in copies/mL.   This assay has received FDA approval for the testing of human plasma only. The   Infect[facility] Disease Diagnostic Laboratory at the [facility], [place], has validated the pe rformance characteristics of the Roche   CMV assay for plasma, bronchial alveolar lavage/wash and urine.     CSPEC  --  Plasma, EDTA anticoagulant   CMVLOG  --  Not Calculated       EBV DNA Copies/mL   Date Value Ref Range Status   10/14/2023 <500 (A) Not Detected copies/mL Final     Comment:     EBV DNA Detected below the reportable range of 500 copies/mL       Imaging:  Recent Results (from the past 48 hour(s))   US Upper Extremity Non Vascular Left    Narrative    Exam: Upper extremity soft tissue nonvascular, 10/24/2023 10:32 AM    Indication: Evaluate for fluid collection associated with left upper  extremity dialysis fistula.    Comparison: 3/16/2023    Findings:   Heterogeneously hypoechoic collection measuring 3.2 x 1.8 x 5.8 cm in  the lateral left upper arm. There is no internal vascularity or  surrounding hyperemia/edema. The collection is not contiguous with the  adjacent vasculature. No worrisome soft tissue component.      Impression    Impression: 3.2 x 1.8 x 5.8 cm hematoma in the lateral left upper  extremity soft tissues. There is no internal flow to suggest  pseudoaneurysm.    I have personally reviewed the examination and initial interpretation  and I agree with the findings.    CANELO SHULTZ MD         SYSTEM ID:  [id number]   POC US GUIDE FOR PARACENTESIS    Impression    US Indication: decompensated liver disease and abdominal distension    FINDINGS:  Limited abdominal  ultrasound was performed and demonstrated an adequate fluid collection on the RLQ of the abdomen. Doppler of the skin demonstrated an area at this site without significant vasculature. A paracentesis at this site was subsequently performed.

## 2023-10-24 NOTE — PROGRESS NOTES
[facility]    Progress Note - Medicine Service, MANUEL TEAM 4       Date of Admission:  10/23/2023    Assessment & Plan   Balnco Osborne is a 58 year old male admitted on 10/23/2023. 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 2016 now with progressive cirrhosis (liver bx 12/2022) with evidence of portal hypertension and ascites, paroxysmal atrial fibrillation on Eliquis, DM type 2, history of CVA, hypertension, CHRISTIAN on BiPAP, chronic pericardial effusion, ESRD on dialysis who presented to [facility] ED on 10/5/23 with chest discomfort and palpitations, admitted with severe sepsis with septic shock requiring pressors secondary to multifactorial pneumonia, atrial fibrillation with aberrancy s/p 2 failed attempts at cardioversion, worsening pericardial effusion on ECHO s/p pericardiocentesis 10/11, and PE on apixaban. 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 Has required multiple taps with subsequent chylous ascites findings despite tailored diet. Has also been on treatment for SBP    Update today:  -diagnotic and therapeutic tap: evidence of elevated WBC though better than before, and elevated G  -albumin bolus               # Cirrhosis secondary to NAFLD s/p liver transplant 2016 now with recurrent cirrhosis with evidence of portal hypertension and ascites   #Immunosuppression  Patient follows with Dr. Simms, though notably has not been seen in years; last clinic visit 4/21/2020 with plan for 6 month follow up. Next appt 12/2023.   evidence of recurrent cirrhosis on liver biopsy 12/2022, he did have alcohol use post transplant and continues to have metabolic syndrome that can also contribute. Poor sample to differentiate cause. Peth has been negative, last +ETG 2017.  Has not had EGD posttransplant.  Continues to have splenomegaly on recent  "CT.  No evidence of HCC.  Currently on  immunosuppression with tacrolimus 1mg q 12 hours. Tacro on 10/24 on  3.7    (desired levels 3-5),   Liver enzymes showed  elevated  alkaline phosphatase . INR us elevated abd elevated INR and albumin is low.   --PTA tacrolimus 0.5mg BID (with goal of 3-5)  -Hepatology consulted appreciate recs    # SBP   # Chylous ascites   # Abdominal pain   Ascites likely related to portal HTN in the setting of cirrhosis, further c/b disruption of lymphatics (d/t increased lymph flow and portal HTN seen with cirrhosis) leading to chylous ascites as reflective of persistently elevated triglyceride. Most recently 130 (10/24_)  Has been on different antibiotics (see admission note).  Currently on cefepime.  Diagnostic and therapeutic paracentesis done on 10/24 most recent ascitic fluid analysis showed cell count of 1035, with ANC of 634 which is improving from previous value suggesting some improvement with antibiotics.  We will do lymphoscintigraphy to locate area of lymphatic leak  Plan:  - Hepatology consulted, appreciate recs              -consider lymphoscintigraphy  - Infectious disease consulted, appreciate recs  - Continue Cefepime 1g Q24h       # Moderate protein-energy malnutrition    High risk for developing malnutrition (PRO losses) and further immunocompromise with chyle losses   -Nutrition consulted,  appreciate recs  - 3 day calorie count average with \"1666 cals (76% needs), 105 gm pro (100% needs) Calorie count reflects pt meeting at least 2/3 nutrition needs with oral intake\" per RD note 10/16               - Currently on 2gm NA and very low fat diet              - GI wanted to continue very low fat diet, as do not expect TG to change quickly   - Likely plan to continue very low fat (LCT) diet for 2-3 weeks then liberalize fat restriction for LCT challenge and repeat TG on next paracentesis to evaluate if chylous ascites persists.    #Left arm fistula sight  pain and " swelling  Swelling and tenderness on the left arm fistula site which is concerning for fistula site infection.  Blood cultures were sent.  Fistula site cultures not sent.  -Follow blood cultures    ----------------------------------------------------------------------------------------  # Multifocal pneumonia, organism unspecified  Acute Respiratory Failure with Hypoxia/Hypercapnia considered and ruled out   - CT scan showed pulmonary infiltrates   - COVID 19/influenza/RSV negative  - Unable to provide sputum specimen  - Abx as detailed above     ---------------------------------------------------------------------------------------  # Recurrent pericardial effusion  # Paroxysmal atrial fibrillation   # NSTEMI  - Troponin did peak at 346 on 10/05 (thought to be due to demand because of shock and EKG did not had any ST elevation)  - ECHO 10/10 with recurrent pericardial effusion slightly larger than seen on ECHO 10/8 s/p pericardiocentesis 10/11 with 560cc removed   - s/p RHC 10/11 which showed normal right and left sided filling pressures, mild elevated pulmonary pressures and portal hypertension with hepatic vein pressure gradient of 10mmHg   - Drain removed on 10/12  - Repeat ECHO 10/17 with no recurrent pericardial effusion, LVEF >70%  - If patient requires repeat pericardiocentesis in future then send for AFB/fungal culture, 16 S and 28 S  - Cardiology had signed off at previous hospital     Plan:  - Continue PTA Eliquis 5mg BID  - On telemetry currently     --------------------------------------------------------------------------------------------  # Pulmonary embolism  - Noted on CT scan   - Treated with heparin gtt      #Previous embolic stroke in 11/2022     ---------------------------------------------------------------------------------------------  # ESRD on HD MWF  # S/p left AV fistula pseudoaneurysm repair, 8/2023  # LAI AV fistula infiltrated on 10/6  # On kidney transplant list    - Nephrology  consulted  - Dialysis per nephrology  - Continue renal multivitamin  - Continue PTA sevelamer 800mg QID     --------------------------------------------------------------------------------------------  # Severe sepsis with septic shock - due to multifactorial pneumonia -resolved  # Chronic hypotension, on midodrine  - Is normally on midodrine for chronic hypotension, was adjusted multiple times during this hospitalization. Now only on midodrine for dialysis.    - Off pressors since 10/7/2023  - Random cortisol level checked on 10/13, which was normal  - S/p 25 g of 25% albumin on 10/14  -------------------------------------------------------------------------------------------------  # Pain at multiple locations  Has pain in left upper extremity due to infiltration of AV fistula, pain in right upper extremity due to thrombophlebitis and frequent lab draws.  - Tylenol 650mg Q8h PRN (keep daily < 2g) for pain  - Oxycodone 5mg PO Q6 PRN for moderate pain  - Dilaudid 2mg PO Q6h PRN for severe pain      # Generalized weakness  - PT and OT ordered     # Probable CHRISTIAN  Per patient's wife, he had a sleep study about 10 years ago but does not have any CPAP or BiPAP at home.  - CPAP ordered for sleep  - Should undergo sleep study as outpatient after discharge     # Anemia of chronic disease  Hgb baseline between 8-9  - Hemoglobin is stable yesterday at 8.3  - Will follow     # Chronic Thrombocytopenia  -Plt yesterday was 175     # Restless leg syndrome  -Continue PTA ropinirole     # Seizure disorder  - Continue PTA Vimpat     # GERD  -Continue PTA PPI             Diet: Combination Diet Renal Diet (dialysis); 2 gm NA Diet; Very Low Fat Diet (up to 10g)  Snacks/Supplements Adult: Ensure Clear; With Meals  Snacks/Supplements Adult: Gelatein Plus; Between Meals  Room Service  Fluid restriction 1200 ML FLUID    DVT Prophylaxis: DOAC  Nixon Catheter: Not present  Fluids: albumin intermittently   Lines: PRESENT      Hemodialysis  Vascular Access Right Subclavian-Site Assessment: WDL  Hemodialysis Vascular Access Arteriovenous fistula Left Forearm-Site Assessment: WDL except;Tender;Ecchymotic      Cardiac Monitoring: ACTIVE order. Indication: Tachyarrhythmias, acute (48 hours)  Code Status: Full Code      Clinically Significant Risk Factors        # Hyperkalemia: Highest K = 5.5 mmol/L in last 2 days, will monitor as appropriate  # Hyponatremia: Lowest Na = 121 mmol/L in last 2 days, will monitor as appropriate      # Hypoalbuminemia: Lowest albumin = 2.8 g/dL at 10/22/2023  9:59 AM, will monitor as appropriate     # Hypertension: Noted on problem list        # Overweight: Estimated body mass index is 25.62 kg/m  as calculated from the following:    Height as of 10/13/23: 1.829 m (6').    Weight as of this encounter: 85.7 kg (188 lb 15 oz)., PRESENT ON ADMISSION  # Moderate Malnutrition: based on nutrition assessment, PRESENT ON ADMISSION   # Financial/Environmental Concerns: none    #Coagulopathy due to anticoagulation with apixaban       Disposition Plan      Expected Discharge Date: 10/27/2023      Destination: home;home with family          The patient's care was discussed with the  the attending physician, Dr Aguirre .    Jagdish Juan MD  Medicine Service, [address]  Securely message with [employer] (more info)  Text page via [employer] Paging/Directory   See signed in provider for up to date coverage information  ______________________________________________________________________    Interval History     No acute overnight events.  Abdominal distention and pain improving after therapeutic paracentesis  Physical Exam   Vital Signs: Temp: 98.2  F (36.8  C) Temp src: Oral BP: (!) 89/67 (prn midodrine given) Pulse: 78   Resp: 19 SpO2: 100 % O2 Device: None (Room air)    Weight: 188 lbs 14.95 oz    eneral Appearance: Laying comfortably in bed. No acute distress.  Respiratory: Clear to  auscultation bilaterally. No increased WOB.  Cardiovascular: Regular rate and rhythm. No murmur heard.  GI: Distended abdomen, generally soft. Non-tender to palpation. Difficult to assess hepatosplenomegaly due to distension. Multiple scars present from previous surgeries and taps.  Skin: No rash. Slightly jaundiced.  ADRIÁN: swollen and tender left arm at the fistula site        Medical Decision Making       Please see A&P for additional details of medical decision making.      Data     I have personally reviewed the following data over the past 24 hrs:    5.2  \   8.6 (L)   / 151     127 (L) 94 (L) 47.6 (H) /  96   4.7 21 (L) 5.41 (H) \     ALT: <5 AST: 21 AP: 189 (H) TBILI: 0.4   ALB: 3.1 (L) TOT PROTEIN: 6.0 (L) LIPASE: N/A     Procal: N/A CRP: 31.40 (H) Lactic Acid: N/A       INR:  N/A PTT:  N/A   D-dimer:  N/A Fibrinogen:  442     Ferritin:  423 (H) % Retic:  N/A LDH:  172

## 2023-10-24 NOTE — PROGRESS NOTES
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      RECOMMENDATIONS:  # DDLT 9/20/16 for MASH  # Immunosuppression  # Recurrent cirrhosis  -Solitary alk phos stable over the past year, now stable.     - continue tacrolimus 0.5 mg BID, trough level pending today.  goal trough level 3-5. He remains on HD.   - evidence of recurrent cirrhosis on liver biopsy 12/2022, he did have alcohol use post transplant and continues to have metabolic syndrome that can also contribute. Poor sample to differentiate cause. Peth has been negative, last +ETG 2017.  Has not had EGD posttransplant.  Continues to have splenomegaly on recent CT.  No evidence of HCC.     # Ascites  # SBP  # chylous ascites  - repeat paracentesis today with cell count, cultures, albumin, total protein and triglycerides.   - If repeat para has evidence of chylous ascites, will consider tagged WBC (lymphoscintigraphy) to evaluate lymph interruption. This most likely related to cirrhosis.      # Hyponatremia  # CKD on HD  -  followed by nephrology.   -Improvement in sodium following HD run yesterday.    Patient was discussed with attending physician, Dr. Miller.  The above note reflects our joint plan.     Next follow up appointment: Dr. Simms 12/29/2023    Thank you for the opportunity to be involved with  Blanco Osborne care. Please call with any questions or concerns.    Jacklyn Liu, STEFFANY, CNP  Inpatient Hepatology [facility]        SUBJECTIVE:  Denies fever.  No increase  in abdominal discomfort or change in mentation.  Has been up walking in his room without dizziness or syncope.    ROS:  A 10-point review of systems was negative.    OBJECTIVE:  VS: BP 90/55 (BP Location: Right arm)   Pulse 78   Temp 97.8  F (36.6  C) (Oral)   Resp 16   Wt 85.7 kg (188 lb 15 oz)   SpO2 97%   BMI 25.62 kg/m        General: In no acute distress, mild facial muscle wasting  Neuro: AOx3, No asterixis  HEENT:  Noscleral icterus, Nooral lesions  CV:  Skin warm and dry  Lungs:  Respirations even and nonlabored on room air  Abd:  Moderately distended, nontender   Extrem: Noperipheral edema   Skin: Nojaundice  Psych: pleasant    MEDICATIONS:  Current Facility-Administered Medications   Medication    acetaminophen (TYLENOL) tablet 650 mg    apixaban ANTICOAGULANT (ELIQUIS) tablet 5 mg    ceFEPIme (MAXIPIME) 1 g vial to attach to  mL bag for ADULTS or NS 50 mL bag for PEDS    gabapentin (NEURONTIN) capsule 300 mg    HYDROmorphone (DILAUDID) tablet 2 mg    Lacosamide (VIMPAT) tablet 100 mg    lacosamide (VIMPAT) tablet 50 mg    melatonin tablet 3 mg    midodrine (PROAMATINE) tablet 10-20 mg    midodrine (PROAMATINE) tablet 10-20 mg    multivitamin RENAL (TRIPHROCAPS) capsule 1 capsule    naloxone (NARCAN) injection 0.2 mg    Or    naloxone (NARCAN) injection 0.4 mg    Or    naloxone (NARCAN) injection 0.2 mg    Or    naloxone (NARCAN) injection 0.4 mg    oxyCODONE (ROXICODONE) tablet 5 mg    pantoprazole (PROTONIX) EC tablet 40 mg    polyethylene glycol (MIRALAX) Packet 17 g    rOPINIRole (REQUIP) tablet 0.5 mg    senna-docusate (SENOKOT-S/PERICOLACE) 8.6-50 MG per tablet 1 tablet    Or    senna-docusate (SENOKOT-S/PERICOLACE) 8.6-50 MG per tablet 2 tablet    sevelamer carbonate (RENVELA) tablet 800 mg    tacrolimus (GENERIC) capsule 0.5 mg       REVIEW OF LABORATORY, PATHOLOGY AND IMAGING RESULTS:  BMP  Recent Labs   Lab 10/24/23  0537 10/23/23  0654 10/22/23  0959 10/21/23  0748   * 121*  "125* 127*   POTASSIUM 4.7 5.5* 4.7 4.2   CHLORIDE 94* 87* 91* 91*   KELLY 8.5* 8.8 8.4* 8.7   CO2  24   BUN 47.6* 87.9* 74.8* 48.6*   CR 5.41* 8.03* 6.90* 5.43*   GLC 96 107* 126* 116*     CBC  Recent Labs   Lab 10/24/23  0537 10/23/23  0654 10/22/23  1655 10/21/23  0748   WBC 5.2 6.0 6.2 4.6   RBC 2.76* 2.83* 2.64* 2.90*   HGB 8.6* 9.0* 8.3* 9.1*   HCT 27.8* 28.0* 26.0* 29.6*   * 99 99 102*   MCH 31.2 31.8 31.4 31.4   MCHC 30.9* 32.1 31.9 30.7*   RDW 17.6* 17.2* 17.0* 17.7*    166 175 169     INRNo lab results found in last 7 days.  LFTs  Recent Labs   Lab 10/24/23  0537 10/23/23  0654 10/22/23  0959 10/21/23  0748   ALKPHOS 189* 177* 164* 182*   AST 21 17 13 13   ALT <5 <5 6 7   BILITOTAL 0.4 0.4 0.3 0.4   PROTTOTAL 6.0* 5.9* 5.7* 6.4   ALBUMIN 3.1* 3.2* 2.8* 3.2*        MELD 3.0: 29 at 10/19/2023  8:10 AM  MELD-Na: 30 at 10/19/2023  8:10 AM  Calculated from:  Serum Creatinine: On dialysis. Using the maximum value.  Serum Sodium: 131 mmol/L at 10/19/2023  8:10 AM  Total Bilirubin: 0.4 mg/dL (Using min of 1 mg/dL) at 10/19/2023  8:10 AM  Serum Albumin: 3.0 g/dL at 10/19/2023  8:10 AM  INR(ratio): 2.00 at 10/17/2023  6:33 AM  Age at listin years  Sex: Male at 10/19/2023  8:10 AM    Previous work-up:   Lab Results   Component Value Date    HEPBANG Nonreactive 2023    HBCAB Nonreactive 2023    AUSAB 0.12 2023    HCVAB  2016     Nonreactive   Assay performance characteristics have not been established for newborns,   infants, and children      HCVRNA Not Detected 2022    HOSSEIN 423 (H) 10/24/2023    IRONSAT 20 10/24/2023    TSH 4.89 (H) 2023    CHOL 87 10/13/2023    HDL 42 10/13/2023    LDL 32 10/13/2023    TRIG 65 10/13/2023    A1C 4.7 08/10/2023    POPETH <10 10/20/2023    PLPETH <10 10/20/2023      No results found for: \"SPECDES\", \"LDRESULTS\"      Imaging Results:  CT chest/abd 10/5/23  IMPRESSION:     1.  Large pericardial effusion. This measures 25 mm in " greatest depth which is suggestive of at least 500 mL of fluid.  2.  Pulmonary arteries are increased in size with the main pulmonary artery measuring 39 mm. This is consistent with pulmonary arterial hypertension. Segmental and subsegmental pulmonary artery emboli are seen to the left upper lobe. No definite right   heart strain.  3.  Small bilateral pleural effusions with the right effusion appearing chronically loculated with pleural thickening. Bilateral lung consolidations right greater than left suspicious for multifocal pneumonia.  4.  Prior liver transplant. Multiple upper abdominal collaterals and splenomegaly suggestive of portal hypertension, with small to moderate abdominopelvic ascites.  5.  Prominent gas and fluid-filled loops of small and large bowel likely reactive ileus.

## 2023-10-24 NOTE — PROGRESS NOTES
Recd consult for dgx and tx para in the setting of decomp cirrhosis. Added fibrinogen level, platelet lvl above goal and INR elevated. Please note for paracentesis and/or thoracentesis with known liver disease we recommend goal platelets >20 fibrinogen >100.     Thank you for consulting the CAP[facility] team, please do not hesitate to page/call/Vocera if you have any further questions       Stephen Burr MD

## 2023-10-24 NOTE — PLAN OF CARE
Neuro: Patient alert and oriented x4. Slept off and on throughout the night.    Cardiac: NSR with 1st degree. BP's 90's systolic. Afebrile.      Respiratory: Sating >95% on RA overnight, refused to wear CPAP.   GI/: Anuric, pt on HD. No BM overnight.   Diet/appetite: Tolerating 2g NA, low fat diet. On 1L FR.   Activity:  Independent, walked in hallway x1 so far today.  Pain: At acceptable level on current regimen. Oxy 5mg given x2.  Skin: No new deficits noted.   LDA's: 1 R PIV, SL. R CVC port for dialysis. L fistula.     Plan: Plans for paracentesis today. Will continue with POC.

## 2023-10-25 LAB
ALBUMIN SERPL BCG-MCNC: 3.1 G/DL (ref 3.5–5.2)
ALP SERPL-CCNC: 170 U/L (ref 40–129)
ALT SERPL W P-5'-P-CCNC: <5 U/L (ref 0–70)
ANION GAP SERPL CALCULATED.3IONS-SCNC: 13 MMOL/L (ref 7–15)
AST SERPL W P-5'-P-CCNC: 18 U/L (ref 0–45)
BILIRUB SERPL-MCNC: 0.3 MG/DL
BUN SERPL-MCNC: 68.5 MG/DL (ref 8–23)
CALCIUM SERPL-MCNC: 8.6 MG/DL (ref 8.6–10)
CHLORIDE SERPL-SCNC: 94 MMOL/L (ref 98–107)
CREAT SERPL-MCNC: 7.08 MG/DL (ref 0.67–1.17)
DEPRECATED HCO3 PLAS-SCNC: 20 MMOL/L (ref 22–29)
EGFRCR SERPLBLD CKD-EPI 2021: 8 ML/MIN/1.73M2
ERYTHROCYTE [DISTWIDTH] IN BLOOD BY AUTOMATED COUNT: 17 % (ref 10–15)
GLUCOSE SERPL-MCNC: 144 MG/DL (ref 70–99)
HCT VFR BLD AUTO: 24.4 % (ref 40–53)
HGB BLD-MCNC: 7.7 G/DL (ref 13.3–17.7)
MCH RBC QN AUTO: 31.2 PG (ref 26.5–33)
MCHC RBC AUTO-ENTMCNC: 31.6 G/DL (ref 31.5–36.5)
MCV RBC AUTO: 99 FL (ref 78–100)
PLATELET # BLD AUTO: 120 10E3/UL (ref 150–450)
POTASSIUM SERPL-SCNC: 4.7 MMOL/L (ref 3.4–5.3)
PROT SERPL-MCNC: 6 G/DL (ref 6.4–8.3)
RBC # BLD AUTO: 2.47 10E6/UL (ref 4.4–5.9)
SODIUM SERPL-SCNC: 127 MMOL/L (ref 135–145)
WBC # BLD AUTO: 4.2 10E3/UL (ref 4–11)

## 2023-10-25 PROCEDURE — 250N000013 HC RX MED GY IP 250 OP 250 PS 637: Performed by: STUDENT IN AN ORGANIZED HEALTH CARE EDUCATION/TRAINING PROGRAM

## 2023-10-25 PROCEDURE — 90935 HEMODIALYSIS ONE EVALUATION: CPT

## 2023-10-25 PROCEDURE — 999N000111 HC STATISTIC OT IP EVAL DEFER: Performed by: OCCUPATIONAL THERAPIST

## 2023-10-25 PROCEDURE — 250N000012 HC RX MED GY IP 250 OP 636 PS 637: Performed by: HOSPITALIST

## 2023-10-25 PROCEDURE — 250N000013 HC RX MED GY IP 250 OP 250 PS 637: Performed by: HOSPITALIST

## 2023-10-25 PROCEDURE — 258N000003 HC RX IP 258 OP 636: Performed by: STUDENT IN AN ORGANIZED HEALTH CARE EDUCATION/TRAINING PROGRAM

## 2023-10-25 PROCEDURE — 999N000147 HC STATISTIC PT IP EVAL DEFER: Performed by: REHABILITATION PRACTITIONER

## 2023-10-25 PROCEDURE — 250N000013 HC RX MED GY IP 250 OP 250 PS 637

## 2023-10-25 PROCEDURE — 250N000011 HC RX IP 250 OP 636

## 2023-10-25 PROCEDURE — 99233 SBSQ HOSP IP/OBS HIGH 50: CPT | Performed by: NURSE PRACTITIONER

## 2023-10-25 PROCEDURE — 85014 HEMATOCRIT: CPT | Performed by: STUDENT IN AN ORGANIZED HEALTH CARE EDUCATION/TRAINING PROGRAM

## 2023-10-25 PROCEDURE — 90935 HEMODIALYSIS ONE EVALUATION: CPT | Performed by: STUDENT IN AN ORGANIZED HEALTH CARE EDUCATION/TRAINING PROGRAM

## 2023-10-25 PROCEDURE — 36415 COLL VENOUS BLD VENIPUNCTURE: CPT | Performed by: STUDENT IN AN ORGANIZED HEALTH CARE EDUCATION/TRAINING PROGRAM

## 2023-10-25 PROCEDURE — 80053 COMPREHEN METABOLIC PANEL: CPT | Performed by: STUDENT IN AN ORGANIZED HEALTH CARE EDUCATION/TRAINING PROGRAM

## 2023-10-25 PROCEDURE — 99233 SBSQ HOSP IP/OBS HIGH 50: CPT | Performed by: STUDENT IN AN ORGANIZED HEALTH CARE EDUCATION/TRAINING PROGRAM

## 2023-10-25 PROCEDURE — 120N000003 HC R&B IMCU UMMC

## 2023-10-25 PROCEDURE — 634N000001 HC RX 634: Mod: JZ | Performed by: STUDENT IN AN ORGANIZED HEALTH CARE EDUCATION/TRAINING PROGRAM

## 2023-10-25 PROCEDURE — 99232 SBSQ HOSP IP/OBS MODERATE 35: CPT | Performed by: PHYSICIAN ASSISTANT

## 2023-10-25 RX ORDER — CEFEPIME HYDROCHLORIDE 1 G/1
1 INJECTION, POWDER, FOR SOLUTION INTRAMUSCULAR; INTRAVENOUS EVERY 24 HOURS
Status: COMPLETED | OUTPATIENT
Start: 2023-10-25 | End: 2023-10-30

## 2023-10-25 RX ORDER — CEFEPIME HYDROCHLORIDE 1 G/1
1 INJECTION, POWDER, FOR SOLUTION INTRAMUSCULAR; INTRAVENOUS EVERY 24 HOURS
Status: DISCONTINUED | OUTPATIENT
Start: 2023-10-25 | End: 2023-10-25

## 2023-10-25 RX ADMIN — SEVELAMER CARBONATE 800 MG: 800 TABLET, FILM COATED ORAL at 12:18

## 2023-10-25 RX ADMIN — OXYCODONE HYDROCHLORIDE 5 MG: 5 TABLET ORAL at 04:49

## 2023-10-25 RX ADMIN — TACROLIMUS 0.5 MG: 0.5 CAPSULE ORAL at 06:46

## 2023-10-25 RX ADMIN — HYDROXYZINE HYDROCHLORIDE 25 MG: 25 TABLET ORAL at 20:11

## 2023-10-25 RX ADMIN — PANTOPRAZOLE SODIUM 40 MG: 40 TABLET, DELAYED RELEASE ORAL at 06:44

## 2023-10-25 RX ADMIN — Medication 1 CAPSULE: at 06:47

## 2023-10-25 RX ADMIN — MIDODRINE HYDROCHLORIDE 10 MG: 5 TABLET ORAL at 06:46

## 2023-10-25 RX ADMIN — APIXABAN 5 MG: 5 TABLET, FILM COATED ORAL at 20:11

## 2023-10-25 RX ADMIN — CEFEPIME HYDROCHLORIDE 1 G: 1 INJECTION, POWDER, FOR SOLUTION INTRAMUSCULAR; INTRAVENOUS at 18:25

## 2023-10-25 RX ADMIN — EPOETIN ALFA-EPBX 8000 UNITS: 10000 INJECTION, SOLUTION INTRAVENOUS; SUBCUTANEOUS at 10:37

## 2023-10-25 RX ADMIN — SODIUM CHLORIDE 250 ML: 9 INJECTION, SOLUTION INTRAVENOUS at 07:57

## 2023-10-25 RX ADMIN — MIDODRINE HYDROCHLORIDE 20 MG: 5 TABLET ORAL at 08:00

## 2023-10-25 RX ADMIN — SODIUM CHLORIDE 300 ML: 9 INJECTION, SOLUTION INTRAVENOUS at 07:57

## 2023-10-25 RX ADMIN — MIDODRINE HYDROCHLORIDE 20 MG: 5 TABLET ORAL at 10:26

## 2023-10-25 RX ADMIN — MIDODRINE HYDROCHLORIDE 10 MG: 5 TABLET ORAL at 20:12

## 2023-10-25 RX ADMIN — LACOSAMIDE 50 MG: 50 TABLET, FILM COATED ORAL at 06:45

## 2023-10-25 RX ADMIN — OXYCODONE HYDROCHLORIDE 5 MG: 5 TABLET ORAL at 20:11

## 2023-10-25 RX ADMIN — SEVELAMER CARBONATE 800 MG: 800 TABLET, FILM COATED ORAL at 17:02

## 2023-10-25 RX ADMIN — Medication 3 MG: at 21:00

## 2023-10-25 RX ADMIN — ACETAMINOPHEN 650 MG: 325 TABLET, FILM COATED ORAL at 06:52

## 2023-10-25 RX ADMIN — GABAPENTIN 300 MG: 300 CAPSULE ORAL at 21:01

## 2023-10-25 RX ADMIN — APIXABAN 5 MG: 5 TABLET, FILM COATED ORAL at 06:45

## 2023-10-25 RX ADMIN — SEVELAMER CARBONATE 800 MG: 800 TABLET, FILM COATED ORAL at 06:44

## 2023-10-25 RX ADMIN — LACOSAMIDE 100 MG: 50 TABLET, FILM COATED ORAL at 20:11

## 2023-10-25 RX ADMIN — TACROLIMUS 0.5 MG: 0.5 CAPSULE ORAL at 17:02

## 2023-10-25 ASSESSMENT — ACTIVITIES OF DAILY LIVING (ADL)
ADLS_ACUITY_SCORE: 24
ADLS_ACUITY_SCORE: 26
ADLS_ACUITY_SCORE: 24
ADLS_ACUITY_SCORE: 24
ADLS_ACUITY_SCORE: 26
ADLS_ACUITY_SCORE: 24
ADLS_ACUITY_SCORE: 26
ADLS_ACUITY_SCORE: 24
ADLS_ACUITY_SCORE: 24
ADLS_ACUITY_SCORE: 26

## 2023-10-25 NOTE — PLAN OF CARE
BP 96/57 (BP Location: Right arm)   Pulse 70   Temp 97.2  F (36.2  C) (Oral)   Resp 20   Wt 85.9 kg (189 lb 6.4 oz)   SpO2 99%   BMI 25.69 kg/m      Shift: 1395-8709  Reason for Admission: OSH with confusion following hypotension during HD and noted to need pericardiocentesis, had chylous ascites and transferred for ongoing liver transplant care.     VS/Tele: VSS on RA. Tele orders discontinued this shift.   Neuros: A/Ox4, able to make needs known  Respiratory: Sats >95% on RA  GI/: 2 formed BMs this shift. Anuric to oliguric d/t HD  Nutrition: Renal diet, low Na, low fat, 1.2L FR  Drains/Lines: R. PIV SL. L. AVF. R. CVC (HD only)  Activity: Pt up ad kaelyn. Worked with PT and OT today- both signed off.   Pain/Nausea: C/o of pain on L. Arm d/t AV fistula- declines medication. Denies nausea  Skin: L. Arm AVF erythema  Social: Family at bedside  New this shift: HD today- 1.5L removed  Plan of care: Continue on IV Cefapime q24h. HD plan is MWF. Will continue to monitor and follow POC.

## 2023-10-25 NOTE — PROGRESS NOTES
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      RECOMMENDATIONS:  # DDLT 9/20/16 for MASH  # Immunosuppression  # Recurrent cirrhosis  -Solitary alk phos stable over the past year, now stable.     - continue tacrolimus 0.5 mg BID, trough level 10/24 at 3.7. goal trough level 3-5. He remains on HD.   - evidence of recurrent cirrhosis on liver biopsy 12/2022, he did have alcohol use post transplant and continues to have metabolic syndrome that can also contribute. Poor sample to differentiate cause. Peth has been negative, last +ETG 2017.  Has not had EGD posttransplant, this will be scheduled as OP.  Continues to have splenomegaly on recent CT.  No evidence of HCC.     # Ascites  # SBP  # chylous ascites  - repeat paracentesis with improvement in PMN's, however, has not shown clearance despite being on abx for past several weeks (cefepime 10/17-, zosyn 10/5-10/12).    - followed by tx ID with plans to continue cefepime for now.   - triglycerides decreased from prior. Will hold on performing tagged WBC (lymphoscintigraphy).   - please repeat para in 2-3 days and send cell count, triglycerides, flow cytometry, cytology.      # Hyponatremia  # CKD on HD  -  followed by nephrology.   -Improvement in sodium following HD     Patient was discussed with attending physician, Dr. Miller.  The above note reflects our joint plan.     Next follow up appointment: Dr. Simms 12/29/2023    Thank you for the opportunity to be involved with   [facility]. Please call with any questions or concerns.    STEFFANY Phillips, CNP  Inpatient Hepatology [facility]        SUBJECTIVE:  Feeling overall well, fatigued due to HD and not as active as he was as an outpatient. Denies nausea, vomiting, fevers.     ROS:  A 10-point review of systems was negative.    OBJECTIVE:  VS: BP (!) 87/58 (BP Location: Right arm, Cuff Size: Adult Regular)   Pulse 76   Temp 98.1  F (36.7  C) (Oral)   Resp 22   Wt 85.9 kg (189 lb 6.4 oz)   SpO2 99%   BMI 25.69 kg/m        General: In no acute distress, mild facial muscle wasting  Neuro: AOx3, No asterixis  HEENT:  Noscleral icterus, Nooral lesions  CV:  Skin warm and dry  Lungs:  Respirations even and nonlabored on room air  Abd:  Mildly distended, nontender   Extrem: Noperipheral edema   Skin: Nojaundice  Psych: pleasant    MEDICATIONS:  Current Facility-Administered Medications   Medication    acetaminophen (TYLENOL) tablet 650 mg    alteplase (CATHFLO ACTIVASE) injection 2 mg    alteplase (CATHFLO ACTIVASE) injection 2 mg    apixaban ANTICOAGULANT (ELIQUIS) tablet 5 mg    camphor-menthol (DERMASARRA) lotion    gabapentin (NEURONTIN) capsule 300 mg    HYDROmorphone (DILAUDID) tablet 2 mg    hydrOXYzine (ATARAX) tablet 25 mg    Lacosamide (VIMPAT) tablet 100 mg    lacosamide (VIMPAT) tablet 50 mg    melatonin tablet 3 mg    midodrine (PROAMATINE) tablet 10-20 mg    midodrine (PROAMATINE) tablet 10-20 mg    multivitamin RENAL (TRIPHROCAPS) capsule 1 capsule    naloxone (NARCAN) injection 0.2 mg    Or    naloxone (NARCAN) injection 0.4 mg    Or    naloxone (NARCAN) injection 0.2 mg    Or    naloxone (NARCAN) injection 0.4 mg    No heparin via hemodialysis machine    oxyCODONE (ROXICODONE) tablet 5 mg    pantoprazole (PROTONIX) EC tablet 40 mg    polyethylene glycol (MIRALAX) Packet 17 g    rOPINIRole (REQUIP) tablet 0.5 mg    senna-docusate (SENOKOT-S/PERICOLACE) 8.6-50 MG per tablet 1 tablet    Or    senna-docusate  (SENOKOT-S/PERICOLACE) 8.6-50 MG per tablet 2 tablet    sevelamer carbonate (RENVELA) tablet 800 mg    sodium chloride (PF) 0.9% PF flush 10 mL    sodium chloride (PF) 0.9% PF flush 10 mL    sodium chloride (PF) 0.9% PF flush 9 mL    sodium chloride (PF) 0.9% PF flush 9 mL    sodium chloride 0.9% BOLUS 100-150 mL    tacrolimus (GENERIC) capsule 0.5 mg       REVIEW OF LABORATORY, PATHOLOGY AND IMAGING RESULTS:  BMP  Recent Labs   Lab 10/25/23  0440 10/24/23  0537 10/23/23  0654 10/22/23  0959   * 127* 121* 125*   POTASSIUM 4.7 4.7 5.5* 4.7   CHLORIDE 94* 94* 87* 91*   KELLY 8.6 8.5* 8.8 8.4*   CO2 20* 21* 19* 23   BUN 68.5* 47.6* 87.9* 74.8*   CR 7.08* 5.41* 8.03* 6.90*   * 96 107* 126*     CBC  Recent Labs   Lab 10/25/23  0440 10/24/23  0537 10/23/23  0654 10/22/23  1655   WBC 4.2 5.2 6.0 6.2   RBC 2.47* 2.76* 2.83* 2.64*   HGB 7.7* 8.6* 9.0* 8.3*   HCT 24.4* 27.8* 28.0* 26.0*   MCV 99 101* 99 99   MCH 31.2 31.2 31.8 31.4   MCHC 31.6 30.9* 32.1 31.9   RDW 17.0* 17.6* 17.2* 17.0*   * 151 166 175     INRNo lab results found in last 7 days.  LFTs  Recent Labs   Lab 10/25/23  0440 10/24/23  0537 10/23/23  0654 10/22/23  0959   ALKPHOS 170* 189* 177* 164*   AST 18 21 17 13   ALT <5 <5 <5 6   BILITOTAL 0.3 0.4 0.4 0.3   PROTTOTAL 6.0* 6.0* 5.9* 5.7*   ALBUMIN 3.1* 3.1* 3.2* 2.8*        MELD 3.0: 29 at 10/19/2023  8:10 AM  MELD-Na: 30 at 10/19/2023  8:10 AM  Calculated from:  Serum Creatinine: On dialysis. Using the maximum value.  Serum Sodium: 131 mmol/L at 10/19/2023  8:10 AM  Total Bilirubin: 0.4 mg/dL (Using min of 1 mg/dL) at 10/19/2023  8:10 AM  Serum Albumin: 3.0 g/dL at 10/19/2023  8:10 AM  INR(ratio): 2.00 at 10/17/2023  6:33 AM  Age at listin years  Sex: Male at 10/19/2023  8:10 AM    Previous work-up:   Lab Results   Component Value Date    HEPBANG Nonreactive 2023    HBCAB Nonreactive 2023    AUSAB 0.12 2023    HCVAB  2016     Nonreactive   Assay performance  "characteristics have not been established for newborns,   infants, and children      HCVRNA Not Detected 12/16/2022    HOSSEIN 423 (H) 10/24/2023    IRONSAT 20 10/24/2023    TSH 4.89 (H) 03/25/2023    CHOL 87 10/13/2023    HDL 42 10/13/2023    LDL 32 10/13/2023    TRIG 65 10/13/2023    A1C 4.7 08/10/2023    POPETH <10 10/20/2023    PLPETH <10 10/20/2023      No results found for: \"SPECDES\", \"LDRESULTS\"      Imaging Results:  None current    "

## 2023-10-25 NOTE — PLAN OF CARE
Neuro: A&Ox4. Calls appropriately.  Cardiac: SR. Soft pressures, SBP 89-100s. 10mg Midodrine given x2 for SBP <90.   Respiratory: Sating >95% on RA, spot checking O2.   GI/: Anuric on HD. No BM this shift.  Diet/appetite: Low Na, 1.2 FR, Low fat diet.  Activity:  Independent  Pain: At acceptable level on current regimen. Oxy given x2 for L fistula pain  Skin: Patient c/o itching, atarax ordered. L elbow scab, dressing removed. Ramon abraisons from scratching, lotion applied.  LDA's: 1x PIV. R chest HD line    Plan: Dialysis tomorrow. Notify primary team with changes.

## 2023-10-25 NOTE — PROGRESS NOTES
[facility] TRANS[facility] ID PROGRESS NOTE     Patient:  Blanco Osborne   Date of birth 1965, Medical record number [id number]  Date of Visit:  10/25/2023  Date of Admission: 10/23/2023  Consult Requester:Starla Aguirre MD          Assessment and Plan:   Recommendations:  Continue Cefepime 1g q24hrs (renally adjusted for HD) to complete a 14 day course of treatment for SBP (through 10/30).   US LUE with 3.2 x 1.8x 5.8 cm hematoma per report.     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 Admitted with septic shock 2/2 multifocal pneumonia requiring pressors c/b A fib with aberrancy s/p 2 attempts at cardioversion which failed, worsening pericardial effusion s/p pericardiocentesis 10/11, and PE on heparin. 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 ID consulted for concern of peritonitis.      SBP on 10/16 paracentesis.   Chylous ascites.   Developed increasing abdominal distension prior to admission. Has needed multiple paracenteses wince arrival. 10/6 1119 WBC/ 1% neuts and trigs >400, 10/10 761 WBC/ 4% neuts, 10/12 719/1% neuts, 10/16 3844 WBC/83% neuts, aerobic culture negative, fungal culture NGTD, AFB Cx NGTD/stain negative, 10/19 without diagnostics. GI consulted and suspect recurrent ascites related to portal HTN in setting of cirrhosis. Attempting diet change to improve chylous ascites. ID was consulted at [facility] and aiding in work up.   -On Cefepime since 10/17     Pericardial effusion   S/p pericardiocentesis 10/11 with 2558 WBC/62% neutrophils, cultures negative. Pericardial drain removed  10/12. Quant gold negative, HIV screen negative, EBV DNA PCR <500/log <2.7,  CMV DNA PCR not detected.     Multifocal pneumonia-resolved.   Septic shock- resolved.   Hypotensive requiring pressor support and ICU admission 10/5. Did have A fib with 2 failed cardioversions, initiated on amiodarone with improvement to NSR. CT chest with PE and pulmonary infiltrates c/f pneumonia and is now s/p broad spectrum abx as above. No sputum collected or organism identified. Was negative for COVID/influenza/RSV on PCR. Completed course of Zosyn 10/5-12 + 2 days of Augmentin.     H/o Liver transplant (2016).   ESRD on HD, transplant listed.  On tacrolimus for liver transplant.      Previous ID Issues:  -H/o pneumococcal bacteremia & parainfluenza pneumonia 11/2022 hospitalization  -H/o Candida parapsilosis empyema (L pleural fluid) s/p 4 wks of fluconazole (completed 1/23/23) and lactobacillus SBP during 12/2022 hospitalization  -HHV-6 + CSF 12/2022, given IV GCV x5 days  -HSV+ lip swab 11/2022      Other ID issues:  - QTc interval:  440 msec 10/14/23  - Pneumocystis prophylaxis:  none  - Viral serostatus & prophylaxis:  CMV D+/R-, EBV D+/R+  - Immunization status:  COVID vaccinated x2, influenza completed for 2023, due for COVID booster, shingrix #2 11/16/2  - Isolation status: good hand hygiene      Thank you for the consult. Transplant ID will continue to follow with you.     45 MINUTES SPENT BY ME on the date of service doing chart review, history, exam, documentation & further activities per the note.      Ebony Machado PA-C  Infectious Diseases  Pager #[phone]          Interim History and Events:     Feeling well today. Continues to complain of LUE pain overlying AV fistula. Abdominal discomfort improved s/p paracentesis. Continues to complain of stable headache.          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 Admitted with septic shock 2/2 multifocal pneumonia requiring pressors c/b A fib with aberrancy s/p 2 attempts at cardioversion which failed, worsening pericardial effusion s/p pericardiocentesis 10/11, and PE on heparin. 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 Has had many repeat paracentesis now with concern for SBP on para 10/16 with rise in WBC count and elevated neutrophils, no culture growth to date. On Cefepime for this.      Transferred to [facility] for further care. On ID evaluation, he complains of ongoing L arm fistula pain and mild redness, ongoing but improved headache, and intermittent but ongoing abdominal pain and distension. Denies cough or sputum production, no vision changes or numbness/tingling, no diarrhea or vomiting. Endorses abdominal pain that is worse with distension and improved post paracentesis. Denies peritoneal symptoms. Regarding LUE fistula, he noted some overlying erythema but this has improved with ice/heat packs. Has ongoing pain overlying it.      Has been hospitalized since 10/5. He is from [place], [place]. No recent travel prior to hospitalization. Has visited the [facility] part of the , but a few yrs ago since that visit. International travel to [place] in past.      Antimicrobials:    Cefepime 10/17-current      Past  Zosyn 10/5-12  Augmentin 10/12-13   Vancomycin 10/5,  Ceftriaxone 10/16        ROS:   -Focused 5 point ROS completed and pertinent positives/negatives listed in interim history.       Physical Examination:  Temp: 98.1  F (36.7  C) Temp src: Oral BP: 93/57 Pulse: 75   Resp: 15 SpO2: 95 % O2 Device: None (Room air)      Vitals:    10/23/23 0100 10/24/23 0034  "10/25/23 0200   Weight: 86.6 kg (190 lb 14.7 oz) 85.7 kg (188 lb 15 oz) 85.9 kg (189 lb 6.4 oz)       Constitutional: Pleasant male seen sitting up in bed, in NAD. Alert and interactive.   HEENT: NCAT, anicteric sclerae, conjunctiva clear. Moist mucous membranes.  Respiratory: Non-labored breathing on RA. Lungs CTAB.  Cardiovascular: Regular rate and rhythm.  GI: Normoactive BS. Abdomen is soft, mildly distended and mildly tender to palpation, no rebound.  Skin: Warm and dry. No rashes or lesions on exposed surfaces.  Musculoskeletal: Extremities grossly normal. Tenderness over light touch of AV fistula on L.  Neurologic: A &O x3, speech normal, answering questions appropriately. Moves all extremities spontaneously. Grossly non-focal.  Neuropsychiatric: Mentation and affect normal/appropriate.  VAD: HD catheter.     Medications:   apixaban ANTICOAGULANT  5 mg Oral BID    epoetin mirta-epbx  8,000 Units Intravenous Once in dialysis/CRRT    gabapentin  300 mg Oral At Bedtime    Lacosamide  100 mg Oral QPM    lacosamide  50 mg Oral QAM    multivitamin RENAL  1 capsule Oral Daily    - MEDICATION INSTRUCTIONS -   Does not apply Once    pantoprazole  40 mg Oral QAM AC    sevelamer carbonate  800 mg Oral TID w/meals    sodium chloride (PF)  9 mL Intracatheter During Dialysis/CRRT (from stock)    sodium chloride (PF)  9 mL Intracatheter During Dialysis/CRRT (from stock)    tacrolimus  0.5 mg Oral BID IS       Infusions/Drips:      Laboratory Data:   No results found for: \"ACD4\"    Inflammatory Markers    Recent Labs   Lab Test 11/22/22  0025   CRP 22.3*       Metabolic Studies       Recent Labs   Lab Test 10/25/23  0440 10/24/23  0537 10/23/23  0654 10/22/23  0959 10/21/23  0748 10/20/23  1949 10/11/23  1746 10/11/23  1430 10/06/23  1530 10/06/23  1040 10/05/23  1611 10/05/23  1244 10/05/23  0248 10/05/23  0245 08/16/23  0607 08/10/23  1032 04/25/23  1113 04/24/23  0815   * 127* 121* 125* 127* 128*   < >  --    < >  -- "    < >  --    < > 136   < > 141   < > 138   POTASSIUM 4.7 4.7 5.5* 4.7 4.2 4.3   < >  --    < >  --    < >  --    < > 3.8   < > 4.3   < > 4.9   CHLORIDE 94* 94* 87* 91* 91* 94*   < >  --    < >  --    < >  --   --  90*   < > 95*   < > 98   CO2 20* 21* 19* 23 24 25   < >  --    < >  --    < >  --   --  24   < > 32*   < > 25   ANIONGAP 13 12 15 11 12 9   < >  --    < >  --    < >  --   --  22*   < > 14   < > 15   BUN 68.5* 47.6* 87.9* 74.8* 48.6* 39.9*   < >  --    < >  --    < >  --   --  44.5*   < > 45.6*   < > 93.8*   CR 7.08* 5.41* 8.03* 6.90* 5.43* 4.37*   < >  --    < >  --    < >  --   --  5.77*   < > 5.76*   < > 7.83*   GFRESTIMATED 8* 11* 7* 9* 11* 15*   < >  --    < >  --    < >  --   --  11*   < > 11*   < > 7*   * 96 107* 126* 116* 141*   < >  --    < >  --    < >  --   --  127*   < > 104*   < > 97   A1C  --   --   --   --   --   --   --   --   --   --   --   --   --   --   --  4.7  --   --    KELLY 8.6 8.5* 8.8 8.4* 8.7 8.4*   < >  --    < >  --    < >  --   --  9.4   < > 9.5   < > 10.4*   PHOS  --   --  3.1  --   --   --   --   --   --  6.6*   < >  --   --  5.2*   < >  --    < > 8.0*   MAG  --   --   --   --   --   --   --   --   --   --   --   --   --  2.1  --   --   --  2.4*   LACT  --   --   --   --   --   --   --  0.4  --   --   --  0.6*   < >  --   --   --   --   --     < > = values in this interval not displayed.       Hepatic Studies    Recent Labs   Lab Test 10/25/23  0440 10/24/23  0537 10/23/23  0654 10/22/23  0959 10/21/23  0748 10/20/23  1949 01/14/23  0615 01/13/23  0553   BILITOTAL 0.3 0.4 0.4 0.3 0.4 0.4   < >  --    ALKPHOS 170* 189* 177* 164* 182* 185*   < >  --    ALBUMIN 3.1* 3.1* 3.2* 2.8* 3.2* 3.0*   < >  --    AST 18 21 17 13 13 13   < >  --    ALT <5 <5 <5 6 7 7   < >  --    LDH  --  172  --   --   --   --   --  217    < > = values in this interval not displayed.       Pancreatitis testing    Recent Labs   Lab Test 10/13/23  0850 12/21/22  1315 11/20/22  1151 04/20/20  1157  08/01/19  1500 01/08/19  0840 10/31/17  1037 09/22/16  0741 09/20/16  1259   AMYLASE  --   --   --   --   --   --   --  72 86   LIPASE 24  --   --   --   --   --   --  63*  --    TRIG 65 106 94 192* 177* 135   < >  --   --     < > = values in this interval not displayed.       Hematology Studies      Recent Labs   Lab Test 10/25/23  0440 10/24/23  0537 10/23/23  0654 10/22/23  1655 10/21/23  0748 10/20/23  1050 11/22/22  0423 11/22/22  0025 11/16/22  0147 11/15/22  1822 11/15/22  1132 11/15/22  1007 11/14/22  0657 11/13/22  0828 11/12/22  1502 11/12/22  1147   WBC 4.2 5.2 6.0 6.2 4.6 4.4   < > 47.0*   < > 55.8* 60.0* 58.0*  58.0*   < > 16.3*   < > 17.2*   ANEU  --   --   --   --   --   --   --  39.0*  --  52.5* 54.6* 49.9*  --  14.7*  --  12.6*   ALYM  --   --   --   --   --   --   --  3.3  --  1.7 1.8 2.3  --  0.8  --  2.1   MARCELO  --   --   --   --   --   --   --  3.8*  --  0.0 1.2 0.6  --  0.8  --  1.9*   AEOS  --   --   --   --   --   --   --  0.0  --  0.0 0.0 0.0  --  0.0  --  0.0   HGB 7.7* 8.6* 9.0* 8.3* 9.1* 9.1*   < > 10.5*   < > 11.5* 11.4* 11.7*  11.7*   < > 11.3*   < > 11.6*   HCT 24.4* 27.8* 28.0* 26.0* 29.6* 28.2*   < > 32.6*   < > 34.5* 34.7* 35.2*  35.2*   < > 36.0*   < > 40.0   * 151 166 175 169 157   < > 229   < > 87* 95* 88*  88*   < > 115*   < > 237    < > = values in this interval not displayed.       Arterial Blood Gas Testing    Recent Labs   Lab Test 03/12/23  1117 03/10/23  1457 03/08/23  1640 03/07/23  1657 03/06/23  1253 03/05/23  2059 03/05/23  1605 12/20/22  0719 11/25/22  1947 11/23/22  1107 11/23/22  0442 11/22/22  1839   PH  --   --   --   --   --  7.30*  --  7.43  --  7.34* 7.47* 7.47*   PCO2  --   --   --   --   --  57*  --  40  --  43 32* 33*   PO2  --   --   --   --   --  94  --  118*  --  99 100 104   HCO3  --   --   --   --   --  28  --  27  --  23 23 24   O2PER 0 21 21 25   < > 30   < > 30   < > 30 40 40    < > = values in this interval not displayed.        Urine  Studies     Recent Labs   Lab Test 11/25/22  1859 10/31/17  1038 09/24/16  1055 09/23/16  1458 03/01/16  0644   URINEPH 5.5 5.0 5.0 7.5* Duplicate request  SEE [id number]    5.0   NITRITE Negative Negative Negative Negative Duplicate request  SEE [id number]  *  Negative   LEUKEST Small* Negative Moderate* Large* Duplicate request  SEE [id number]  *  Negative   WBCU  --  1 13* 103* 2*       Vancomycin Levels     Recent Labs   Lab Test 11/16/22  0546 11/15/22  0503 11/14/22  1020 11/14/22  0551 11/13/22  0516   VANCOMYCIN 13.6 14.7 18.1 19.8 23.0       Tobramycin levels     No lab results found.    Gentamicin levels    No lab results found.    CSF testing     Recent Labs   Lab Test 12/21/22  1526   CWBC 3   CRBC 58*   CGLU 69   CTP 71*         Microbiology:  Culture   Date Value Ref Range Status   10/24/2023 No growth, less than 1 day  Preliminary   10/23/2023 No growth after 1 day  Preliminary   10/23/2023 No growth after 1 day  Preliminary   10/16/2023 No Growth  Final   10/16/2023 No growth after 8 days  Preliminary   10/12/2023 No Growth  Final   10/11/2023 No Growth  Final   10/10/2023 No Growth  Final   10/06/2023 No Growth  Final   10/06/2023 No anaerobic organisms isolated  Final   10/06/2023 No growth after 18 days  Preliminary   10/05/2023 No Growth  Final   10/05/2023 No Growth  Final   09/29/2023 No Growth  Final   03/06/2023 No Growth  Final   01/31/2023 No Growth  Final   01/31/2023 No Growth  Final   01/24/2023 No Growth  Final   01/24/2023 No anaerobic organisms isolated  Final   01/13/2023 No Growth  Final   01/13/2023 No Growth  Final   01/13/2023 No anaerobic organisms isolated  Final   01/13/2023 No Growth  Final   01/13/2023 No Growth  Final   01/13/2023 No anaerobic organisms isolated  Final   01/13/2023 No Growth  Final   01/10/2023 No Growth  Final   01/10/2023 No Growth  Final   12/26/2022 No Growth  Final   12/26/2022 No Growth  Final   12/22/2022 Isolated in broth only Lactobacillus species (A)   Final     Comment:     On day 4 of incubationIdentification obtained by MALDI-TOF mass spectrometry research use only database. Test characteristics determined and verified by the [facility].Susceptibilities not routinely done   12/22/2022 Isolated in broth only Lactobacillus species (A)  Final     Comment:     On day 3 of incubationIdentification obtained by MALDI-TOF mass spectrometry research use only database. Test characteristics determined and verified by the [facility] Diseases Diagnostic Laboratory.   12/21/2022 No Growth  Final   12/21/2022 No Growth  Final   12/17/2022 (A)  Final    Isolated in broth only Candida parapsilosis complex     Comment:     On day 5 of incubation  Susceptibilities not routinely done   12/16/2022 2+ Staphylococcus epidermidis (A)  Final     Comment:     Susceptibilities not routinely done   12/16/2022 2+ Candida parapsilosis complex (A)  Final     Comment:     Susceptibilities not routinely done   12/16/2022 No Growth  Final   12/15/2022 No MRSA isolated  Final   12/01/2022 No Growth  Final   12/01/2022 No anaerobic organisms isolated  Final   11/26/2022 No Growth  Final   11/16/2022 No Growth  Final   11/15/2022 1+ Aspergillus terreus (A)  Final   11/14/2022 No Growth  Final   11/14/2022 No Growth  Final   11/13/2022 No Growth  Final   11/13/2022 No Growth  Final   11/13/2022 1+ Staphylococcus epidermidis (A)  Final     Comment:     Susceptibilities not routinely done   11/12/2022 Positive on the 1st day of incubation (A)  Final   11/12/2022 Streptococcus pneumoniae (AA)  Corrected     Comment:     2 of 2 bottles     11/12/2022 No Growth  Final       Last check of C difficile  C Difficile Toxin B by PCR   Date Value Ref Range Status   10/10/2023 Negative Negative Final     Comment:     A negative result does not exclude actual disease due to C. difficile and may be due to improper collection, handling and storage of the specimen or the number of  organisms in the specimen is below the detection limit of the assay.       Imaging:  Results for orders placed or performed during the hospital encounter of 10/23/23   US Upper Extremity Non Vascular Left    Narrative    Exam: Upper extremity soft tissue nonvascular, 10/24/2023 10:32 AM    Indication: Evaluate for fluid collection associated with left upper  extremity dialysis fistula.    Comparison: 3/16/2023    Findings:   Heterogeneously hypoechoic collection measuring 3.2 x 1.8 x 5.8 cm in  the lateral left upper arm. There is no internal vascularity or  surrounding hyperemia/edema. The collection is not contiguous with the  adjacent vasculature. No worrisome soft tissue component.      Impression    Impression: 3.2 x 1.8 x 5.8 cm hematoma in the lateral left upper  extremity soft tissues. There is no internal flow to suggest  pseudoaneurysm.    I have personally reviewed the examination and initial interpretation  and I agree with the findings.    CANELO SHULTZ MD         SYSTEM ID:  [id number]   POC US GUIDE FOR PARACENTESIS    Impression    US Indication: decompensated liver disease and abdominal distension    FINDINGS:  Limited abdominal ultrasound was performed and demonstrated an adequate fluid collection on the RLQ of the abdomen. Doppler of the skin demonstrated an area at this site without significant vasculature. A paracentesis at this site was subsequently performed.        *Note: Due to a large number of results and/or encounters for the requested time period, some results have not been displayed. A complete set of results can be found in Results Review.

## 2023-10-25 NOTE — PROGRESS NOTES
HEMODIALYSIS TREATMENT NOTE    Date: 10/25/2023  Time: 11:24 AM    Data:    Weight change: 1.5 kg  Ultrafiltration - Post Run Net Total Removed (mL): 1500 mL  Vascular Access Status: patent  Dialyzer Rinse: Clear  Total Blood Volume Processed: 74.51 L Liters  Total Dialysis (Treatment) Time: 3.5 Hours  K2Ca2.5     CVC used w good flows   Lab:   No    Interventions:  No labs obtained. Pt tolerated tx w intermittent hypotension managed w PO midodrine. Total 40mg PO midodrine, and Epo administered r.t iHD. CVC C,D,&I- no interventions. CVC locked saline and caps changed. K2Ca2.5 per protocol based off labs 10/25/2023.     Assessment:  A&Ox4. Hr-RRR . 20 rpm. Lung sounds diminished ant & post BUL/BLL. Edema present. Mgmt w UF pull. CVC C,D,&I- no interventions. CVC locked w saline and caps changed, pt denies complaints of pain.      Plan:    Per renal and pcn

## 2023-10-25 NOTE — PROGRESS NOTES
Nephrology Progress Note  10/25/2023         Assessment & Recommendations:   Blanco Osborne is a 58 year old male with PMH of cirrhosis secondary to NAFLD s/p liver transplant  in 2016 now with progressive cirrhosis (liver bx 12/2022) with evidence of portal hypertension and ascites, paroxysmal atrial fibrillation on Eliquis, DM type 2, PEA arrest 12/2022, history of CVA, HTN, CHRISTIAN on BiPAP, ESRD on dialysis who presented to [facility] ED on 10/5/23 with chest discomfort and palpitations, admitted with severe sepsis with septic shock requiring pressors secondary to multifactorial pneumonia, atrial fibrillation with aberrancy s/p 2 failed attempts at cardioversion, worsening pericardial effusion on ECHO s/p pericardiocentesis 10/11, and PE started on IV heparin. 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 Has required multiple taps with subsequent chylous ascites findings despite tailored diet.  Transferred to [facility] to be seen by liver transplant team.     # ESKD: HD dependent ~ 1yr. Dialyzes MWF at [facility] SLP with Dr. Bradshaw. EDW 85.5 kg. Access: tunneled RIJ (has recently revised L AVF, unit has used twice using 17 g)  - Plan MWF dialysis  - plan to use CVC today, if admission is prolonged, will start using AVF     # Volume: EDW 85.5 kg (highly dependent on ascites at this point); newly developing chylous ascites; had RHC 10/11 at wt of 87.7 kg, normal R and L filling pressures, mild pulm HTN, L pericardial effusion; echo 10/17 with hyperkinetic EF > 70 and no pericardial effusion s/p drain  # Chronic hypotension  - requires midodrine before HD and often again during  - usual UF 2 to 2.5 kg  - UF today limited by hypotension  - s/p 2.7 kg para 10/24     # Hypervolemic hyponatremia: 127  - continue 1200 ml fluid restriction and 2g Na diet     # BMD: Ca 8's, alb 3.1, phos 3.1  - reduced sevelamer to 800 mg tid (not qid)     # Anemia of CKD: hgb 7-8's; iron  studies 10/24: ferritin 423, iron 38, IS 20  - continue epo 8K per run  - hold venofer in setting of infection, but post infection would benefit from iron loading     # SBP  - on cefepime  # s/p liver transplant: now with cirrhosis, portal HTN, chylous ascites        Recommendations were communicated to primary team via this note    Seen and discussed with Dr. Melody Olivas, PA   Division of Renal Disease and Hypertension  [phone]    Interval History :   Seen on dialysis, stable run with gentle UF goals, limited by BP today, likely in part due to 2.7L para yesterday and ongoing SBP. No current n/v, CP, SOB, chills. Abd pain much better    Review of Systems:   4 point ROS neg other than as noted above    Physical Exam:   I/O last 3 completed shifts:  In: 435 [P.O.:435]  Out: -    BP (!) 87/57 (BP Location: Right arm, Cuff Size: Adult Regular)   Pulse 68   Temp 98.1  F (36.7  C) (Oral)   Resp 16   Wt 85.9 kg (189 lb 6.4 oz)   SpO2 100%   BMI 25.69 kg/m       GENERAL APPEARANCE: alert, NAD  EYES: no scleral icterus, pupils equal  Pulmonary: no increased WOB  CV: RRR   - Edema no peripheral  GI: soft, distended, NT  MS: no evidence of inflammation in joints, no muscle tenderness  : no gomez  SKIN: no rash, warm, dry, no cyanosis  NEURO: face symmetric, a/o3  Access: CVC (newly revised L AVF)    Labs:   All labs reviewed by me  Electrolytes/Renal -   Recent Labs   Lab Test 10/25/23  0440 10/24/23  0537 10/23/23  0654 10/06/23  1530 10/06/23  1040 10/06/23  0905 10/06/23  0533 10/05/23  0251 10/05/23  0245 04/25/23  1113 04/24/23  0815 04/19/23  1433 04/18/23  0928   * 127* 121*   < >  --   --  134*   < > 136   < > 138   < > 136   POTASSIUM 4.7 4.7 5.5*   < >  --   --  4.6   < > 3.8   < > 4.9   < > 4.2   CHLORIDE 94* 94* 87*   < >  --   --  94*  --  90*   < > 98   < > 97*   CO2 20* 21* 19*   < >  --   --  23  --  24   < > 25   < > 25   BUN 68.5* 47.6* 87.9*   < >  --   --  60.5*  --  44.5*    < > 93.8*   < > 69.9*   CR 7.08* 5.41* 8.03*   < >  --   --  7.03*  --  5.77*   < > 7.83*   < > 5.62*   * 96 107*   < >  --    < > 152*   < > 127*   < > 97   < > 124*   KELLY 8.6 8.5* 8.8   < >  --   --  8.9  --  9.4   < > 10.4*   < > 10.3*   MAG  --   --   --   --   --   --   --   --  2.1  --  2.4*  --  2.0   PHOS  --   --  3.1  --  6.6*  --  6.7*  --  5.2*   < > 8.0*   < > 5.5*    < > = values in this interval not displayed.       CBC -   Recent Labs   Lab Test 10/25/23  0440 10/24/23  0537 10/23/23  0654   WBC 4.2 5.2 6.0   HGB 7.7* 8.6* 9.0*   * 151 166       LFTs -   Recent Labs   Lab Test 10/25/23  0440 10/24/23  0537 10/23/23  0654   ALKPHOS 170* 189* 177*   BILITOTAL 0.3 0.4 0.4   ALT <5 <5 <5   AST 18 21 17   PROTTOTAL 6.0* 6.0* 5.9*   ALBUMIN 3.1* 3.1* 3.2*       Iron Panel -   Recent Labs   Lab Test 10/24/23  0537 04/14/23  0725 04/06/23  0808 03/25/23  1058 03/12/23  0756   IRON 38* 60* 78  --  37*   IRONSAT 20 29  --   --  26   HOSSEIN 423* 286  --    < > 252    < > = values in this interval not displayed.         Imaging:  Reviewed    Current Medications:   apixaban ANTICOAGULANT  5 mg Oral BID    gabapentin  300 mg Oral At Bedtime    Lacosamide  100 mg Oral QPM    lacosamide  50 mg Oral QAM    multivitamin RENAL  1 capsule Oral Daily    - MEDICATION INSTRUCTIONS -   Does not apply Once    pantoprazole  40 mg Oral QAM AC    sevelamer carbonate  800 mg Oral TID w/meals    sodium chloride (PF)  9 mL Intracatheter During Dialysis/CRRT (from stock)    sodium chloride (PF)  9 mL Intracatheter During Dialysis/CRRT (from stock)    tacrolimus  0.5 mg Oral BID IS       JEANCARLOS Fowler

## 2023-10-25 NOTE — PROGRESS NOTES
Temp: 98.4  F (36.9  C) Temp src: Oral BP: 92/58 Pulse: 76   Resp: 20 SpO2: 100 % O2 Device: None (Room air)      Shift: [id number]       Neuro: A&O x4. Able to make needs known. Able to sleep most of shift.   Cardiac: Tele in place, SR. Afebrile. Denies chest pain.  Resp: VSS on RA overnight. Denies SOB.   GI/: Anuric, on hemodialysis. No BM this shift.    Skin: Spot on R hand, marked. No changes this shift. PRN anti-itch lotion applied with relief.   Activity: Up independently. Walked around unit.   LDA's: PIV in place SL. HD line on R chest. Previous fistula.   Pain: Left arm in pain from occluded fistula- team aware. PRN oxycodone given 1x.      Plan: Continue to monitor and follow POC. Notify with changes. Pt to dialysis this morning 0740am. Will receive pre-dialysis midodrine and 0800am medications.

## 2023-10-25 NOTE — PROGRESS NOTES
[facility]    Medicine Progress Note - Medicine Service, MANUEL TEAM 4    Date of Admission:  10/23/2023    Assessment & Plan   Blanco Osborne is a 58 year old male admitted on 10/23/2023. 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 2016 now with progressive cirrhosis (liver bx 12/2022) with evidence of portal hypertension and ascites, paroxysmal atrial fibrillation on Eliquis, DM type 2, history of CVA, hypertension, CHRISTIAN on BiPAP, chronic pericardial effusion, ESRD on dialysis who presented to [facility] ED on 10/5/23 with chest discomfort and palpitations, admitted with severe sepsis with septic shock requiring pressors secondary to multifactorial pneumonia, atrial fibrillation with aberrancy s/p 2 failed attempts at cardioversion, worsening pericardial effusion on ECHO s/p pericardiocentesis 10/11, and PE on apixaban. 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 Has required multiple taps with subsequent chylous ascites findings despite tailored diet. Has also been on treatment for SBP    Update today:  - Continue cefepime  - Dialysis today   - Likely repeat paracentesis at the end of the week to monitor for improvement in PMNs. Will discuss with ID and hepatology.        # Cirrhosis secondary to NAFLD s/p liver transplant 2016 now with recurrent cirrhosis with evidence of portal hypertension and ascites   #Immunosuppression  Patient follows with Dr. Simms, though notably has not been seen in years; last clinic visit 4/21/2020 with plan for 6 month follow up. Next appt 12/2023.   evidence of recurrent cirrhosis on liver biopsy 12/2022, he did have alcohol use post transplant and continues to have metabolic syndrome that can also contribute. Poor sample to differentiate cause. Peth has been negative, last +ETG 2017.  Has not had EGD  posttransplant.  Continues to have splenomegaly on recent CT.  No evidence of HCC.  Currently on  immunosuppression with tacrolimus 1mg q 12 hours. Tacro on 10/24 on  3.7    (desired levels 3-5), Liver enzymes showed  elevated  alkaline phosphatase . INR us elevated abd elevated INR and albumin is low.   -PTA tacrolimus 0.5mg BID (with goal of 3-5)  -Hepatology consulted appreciate recs    # SBP   # Chylous ascites   # Abdominal pain   Ascites likely related to portal HTN in the setting of cirrhosis, further c/b disruption of lymphatics (d/t increased lymph flow and portal HTN seen with cirrhosis) leading to chylous ascites as reflective of persistently elevated triglyceride. Most recently 130 (10/24_)  Has been on different antibiotics (see admission note).  Currently on cefepime.  Diagnostic and therapeutic paracentesis done on 10/24 most recent ascitic fluid analysis showed cell count of 1035, with ANC of 634 which is improving from previous value suggesting some improvement with antibiotics.   Plan:  - Hepatology consulted, appreciate recs              -consider lymphoscintigraphy (hold for now given improvement in triglycerides)  - Infectious disease consulted, appreciate recs  - Continue Cefepime 1g Q24h     # Moderate protein-energy malnutrition  -Nutrition consulted,  appreciate recs              - Currently on 2gm NA and very low fat diet              - GI wanted to continue very low fat diet, as do not expect TG to change quickly   - Likely plan to continue very low fat (LCT) diet for 2-3 weeks then liberalize fat restriction for LCT challenge and repeat TG on next paracentesis to evaluate if chylous ascites persists.    #Left arm fistula sight  pain and swelling  Swelling and tenderness on the left arm fistula site which is concerning for fistula site infection.  Blood cultures were sent.  -Follow blood cultures    # Concern for multifocal pneumonia, organism unspecified  - CT scan showed pulmonary  infiltrates   - COVID 19/influenza/RSV negative  - Unable to provide sputum specimen  - Abx as detailed above    # Recurrent pericardial effusion  # Paroxysmal atrial fibrillation   # NSTEMI  - Troponin did peak at 346 on 10/05 (thought to be due to demand because of shock and EKG did not had any ST elevation)  - ECHO 10/10 with recurrent pericardial effusion slightly larger than seen on ECHO 10/8 s/p pericardiocentesis 10/11 with 560cc removed   - s/p RHC 10/11 which showed normal right and left sided filling pressures, mild elevated pulmonary pressures and portal hypertension with hepatic vein pressure gradient of 10mmHg   - Drain removed on 10/12  - Repeat ECHO 10/17 with no recurrent pericardial effusion, LVEF >70%  - If patient requires repeat pericardiocentesis in future then send for AFB/fungal culture, 16 S and 28 S  - Cardiology had signed off at previous hospital     Plan:  - Continue PTA Eliquis 5mg BID  - On telemetry currently     # Pulmonary embolism  - Noted on CT scan   - Treated with heparin gtt      #Previous embolic stroke in 11/2022     # ESRD on HD MWF  # S/p left AV fistula pseudoaneurysm repair, 8/2023  # LUE AV fistula infiltrated on 10/6  # On kidney transplant list    - Nephrology consulted  - Dialysis per nephrology  - Continue renal multivitamin  - Continue PTA sevelamer 800mg QID    # Severe sepsis with septic shock - due to multifactorial pneumonia -resolved  # Chronic hypotension, on midodrine  - Is normally on midodrine for chronic hypotension, was adjusted multiple times during this hospitalization. Now only on midodrine for dialysis.    - Off pressors since 10/7/2023  - Random cortisol level checked on 10/13, which was normal  - S/p 25 g of 25% albumin on 10/14    # Pain at multiple locations  Has pain in left upper extremity due to infiltration of AV fistula, pain in right upper extremity due to thrombophlebitis and frequent lab draws.  - Tylenol 650mg Q8h PRN (keep daily < 2g) for  pain  - Oxycodone 5mg PO Q6 PRN for moderate pain  - Dilaudid 2mg PO Q6h PRN for severe pain      # Generalized weakness  - PT and OT ordered     # Probable CHRISTIAN  Per patient's wife, he had a sleep study about 10 years ago but does not have any CPAP or BiPAP at home.  - CPAP ordered for sleep  - Should undergo sleep study as outpatient after discharge     # Anemia of chronic disease  Hgb baseline between 8-9  - Hemoglobin is stable yesterday at 8.3  - Will follow     # Chronic Thrombocytopenia  Likely due to liver disease.   -Plt yesterday was 175     # Restless leg syndrome  -Continue PTA ropinirole     # Seizure disorder  - Continue PTA Vimpat     # GERD  -Continue PTA PPI            Diet: Combination Diet Renal Diet (dialysis); 2 gm NA Diet; Very Low Fat Diet (up to 10g)  Snacks/Supplements Adult: Ensure Clear; With Meals  Snacks/Supplements Adult: Gelatein Plus; Between Meals  Room Service  Fluid restriction 1200 ML FLUID    DVT Prophylaxis: DOAC  Nixon Catheter: Not present  Lines: PRESENT      Hemodialysis Vascular Access Right Subclavian-Site Assessment: WDL  Hemodialysis Vascular Access Arteriovenous fistula Left Forearm-Site Assessment: WDL except;Tender;Ecchymotic;Edematous;Thrill present      Cardiac Monitoring: ACTIVE order. Indication: Tachyarrhythmias, acute (48 hours)  Code Status: Full Code      Clinically Significant Risk Factors         # Hyponatremia: Lowest Na = 127 mmol/L in last 2 days, will monitor as appropriate      # Hypoalbuminemia: Lowest albumin = 2.8 g/dL at 10/22/2023  9:59 AM, will monitor as appropriate   # Thrombocytopenia: Lowest platelets = 120 in last 2 days, will monitor for bleeding   # Hypertension: Noted on problem list        # Overweight: Estimated body mass index is 25.69 kg/m  as calculated from the following:    Height as of 10/13/23: 1.829 m (6').    Weight as of this encounter: 85.9 kg (189 lb 6.4 oz)., PRESENT ON ADMISSION  # Moderate Malnutrition: based on nutrition  assessment, PRESENT ON ADMISSION   # Financial/Environmental Concerns: none         Disposition Plan     Expected Discharge Date: 10/27/2023      Destination: home;home with family              Starla Aguirre MD  Medicine Service, [facility] TEAM 4  [facility]  Securely message with [employer] (more info)  Text page via [facility] Paging/Directory   See signed in provider for up to date coverage information  ______________________________________________________________________    Interval History   No acute events overnight. Overall feeling much better today. Reports pain in his abdomen is better. No nausea or vomiting. Has gotten up to walk several times over the past day.     Physical Exam   Vital Signs: Temp: 97.8  F (36.6  C) Temp src: Oral BP: 107/71 Pulse: 77   Resp: 16 SpO2: 96 % O2 Device: None (Room air)    Weight: 189 lbs 6.4 oz    General Appearance: Alert, pleasant, NAD.   Respiratory: Breathing comfortably on room air.   Cardiovascular: RRR. Normal S1/S2. No murmurs.   GI: Soft, mildly distended, non-tender. No guarding or rebound.   Skin: No rash or lesions.   Other: Alert and oriented x3.     Medical Decision Making       55 MINUTES SPENT BY ME on the date of service doing chart review, history, exam, documentation & further activities per the note.      Data     I have personally reviewed the following data over the past 24 hrs:    4.2  \   7.7 (L)   / 120 (L)     127 (L) 94 (L) 68.5 (H) /  144 (H)   4.7 20 (L) 7.08 (H) \     ALT: <5 AST: 18 AP: 170 (H) TBILI: 0.3   ALB: 3.1 (L) TOT PROTEIN: 6.0 (L) LIPASE: N/A

## 2023-10-25 NOTE — PLAN OF CARE
Occupational Therapy: Orders received. Chart reviewed and discussed with care team.? Occupational Therapy not indicated as pt has no acute OT needs per PT. Is moving well, has necessary assist at home, and plans to attend OP CR.? Defer discharge recommendations to PT.? Will complete orders.

## 2023-10-25 NOTE — PROGRESS NOTES
Physical Therapy: Cancel/defer, Orders received. Chart reviewed and discussed with care team.? Physical Therapy not indicated due to patient indep with sup to sit, to stand, gait with progression to lateral and vertical head turns, increased step length and speed without LOB, up/down 3 stairs with combo of B rails, BUE support on 1 rail, step to and reciprocal with pt indicating he varies based upon how he feels and what railings are available. Mod Indep on stairs, and reports did stairs with his brother yesterday for exercise & walks laps in halls.  Wife arrives, she is a RN and is good support for patient if needed.  Pt will be home alone for up to 4-5 hrs a day, but neither or concerned about this, and both deny balance, mobility and ADL concerns.  Pt does mention a history of poor cervical AROM and shoulder AROM and correctly demonstrates AROM of these without being asked and plans to continue working on this, as well as his fwd head and shoulder posture, especially when doing shoulder exercises.  Pt is in agreement with OP CR & anticipates hospital disch this weekend.  He agrees to continue walking halls in meantime, and plans to have supervision on stairs.? Will complete orders.     Entered by: Kailee Jose, PT 10/25/2023 5:00 PM

## 2023-10-26 LAB
ALBUMIN SERPL BCG-MCNC: 3 G/DL (ref 3.5–5.2)
ALP SERPL-CCNC: 167 U/L (ref 40–129)
ALT SERPL W P-5'-P-CCNC: <5 U/L (ref 0–70)
ANION GAP SERPL CALCULATED.3IONS-SCNC: 9 MMOL/L (ref 7–15)
AST SERPL W P-5'-P-CCNC: 16 U/L (ref 0–45)
BILIRUB SERPL-MCNC: 0.3 MG/DL
BUN SERPL-MCNC: 45 MG/DL (ref 8–23)
CALCIUM SERPL-MCNC: 8.5 MG/DL (ref 8.6–10)
CHLORIDE SERPL-SCNC: 95 MMOL/L (ref 98–107)
CREAT SERPL-MCNC: 4.93 MG/DL (ref 0.67–1.17)
CRP SERPL-MCNC: 30.4 MG/L
DEPRECATED HCO3 PLAS-SCNC: 24 MMOL/L (ref 22–29)
EGFRCR SERPLBLD CKD-EPI 2021: 13 ML/MIN/1.73M2
ERYTHROCYTE [DISTWIDTH] IN BLOOD BY AUTOMATED COUNT: 17.2 % (ref 10–15)
GLUCOSE BLDC GLUCOMTR-MCNC: 119 MG/DL (ref 70–99)
GLUCOSE SERPL-MCNC: 100 MG/DL (ref 70–99)
HCT VFR BLD AUTO: 24.6 % (ref 40–53)
HGB BLD-MCNC: 7.7 G/DL (ref 13.3–17.7)
INR PPP: 1.7 (ref 0.85–1.15)
MCH RBC QN AUTO: 31.6 PG (ref 26.5–33)
MCHC RBC AUTO-ENTMCNC: 31.3 G/DL (ref 31.5–36.5)
MCV RBC AUTO: 101 FL (ref 78–100)
PLATELET # BLD AUTO: 154 10E3/UL (ref 150–450)
POTASSIUM SERPL-SCNC: 4.4 MMOL/L (ref 3.4–5.3)
PROT SERPL-MCNC: 5.9 G/DL (ref 6.4–8.3)
RBC # BLD AUTO: 2.44 10E6/UL (ref 4.4–5.9)
SODIUM SERPL-SCNC: 128 MMOL/L (ref 135–145)
WBC # BLD AUTO: 5.2 10E3/UL (ref 4–11)

## 2023-10-26 PROCEDURE — 99232 SBSQ HOSP IP/OBS MODERATE 35: CPT | Performed by: INTERNAL MEDICINE

## 2023-10-26 PROCEDURE — 250N000011 HC RX IP 250 OP 636

## 2023-10-26 PROCEDURE — 120N000002 HC R&B MED SURG/OB UMMC

## 2023-10-26 PROCEDURE — 250N000012 HC RX MED GY IP 250 OP 636 PS 637: Performed by: HOSPITALIST

## 2023-10-26 PROCEDURE — 99233 SBSQ HOSP IP/OBS HIGH 50: CPT | Mod: GC | Performed by: STUDENT IN AN ORGANIZED HEALTH CARE EDUCATION/TRAINING PROGRAM

## 2023-10-26 PROCEDURE — 250N000013 HC RX MED GY IP 250 OP 250 PS 637

## 2023-10-26 PROCEDURE — 80053 COMPREHEN METABOLIC PANEL: CPT | Performed by: STUDENT IN AN ORGANIZED HEALTH CARE EDUCATION/TRAINING PROGRAM

## 2023-10-26 PROCEDURE — 85610 PROTHROMBIN TIME: CPT | Performed by: STUDENT IN AN ORGANIZED HEALTH CARE EDUCATION/TRAINING PROGRAM

## 2023-10-26 PROCEDURE — 86140 C-REACTIVE PROTEIN: CPT | Performed by: INTERNAL MEDICINE

## 2023-10-26 PROCEDURE — 250N000013 HC RX MED GY IP 250 OP 250 PS 637: Performed by: HOSPITALIST

## 2023-10-26 PROCEDURE — 85027 COMPLETE CBC AUTOMATED: CPT | Performed by: STUDENT IN AN ORGANIZED HEALTH CARE EDUCATION/TRAINING PROGRAM

## 2023-10-26 PROCEDURE — 250N000013 HC RX MED GY IP 250 OP 250 PS 637: Performed by: STUDENT IN AN ORGANIZED HEALTH CARE EDUCATION/TRAINING PROGRAM

## 2023-10-26 PROCEDURE — 36415 COLL VENOUS BLD VENIPUNCTURE: CPT | Performed by: STUDENT IN AN ORGANIZED HEALTH CARE EDUCATION/TRAINING PROGRAM

## 2023-10-26 RX ADMIN — HYDROXYZINE HYDROCHLORIDE 25 MG: 25 TABLET ORAL at 07:53

## 2023-10-26 RX ADMIN — HYDROMORPHONE HYDROCHLORIDE 2 MG: 2 TABLET ORAL at 21:47

## 2023-10-26 RX ADMIN — APIXABAN 5 MG: 5 TABLET, FILM COATED ORAL at 07:47

## 2023-10-26 RX ADMIN — MIDODRINE HYDROCHLORIDE 10 MG: 5 TABLET ORAL at 07:53

## 2023-10-26 RX ADMIN — OXYCODONE HYDROCHLORIDE 5 MG: 5 TABLET ORAL at 11:11

## 2023-10-26 RX ADMIN — MIDODRINE HYDROCHLORIDE 10 MG: 5 TABLET ORAL at 06:50

## 2023-10-26 RX ADMIN — HYDROXYZINE HYDROCHLORIDE 25 MG: 25 TABLET ORAL at 17:40

## 2023-10-26 RX ADMIN — LACOSAMIDE 100 MG: 50 TABLET, FILM COATED ORAL at 20:22

## 2023-10-26 RX ADMIN — CEFEPIME HYDROCHLORIDE 1 G: 1 INJECTION, POWDER, FOR SOLUTION INTRAMUSCULAR; INTRAVENOUS at 17:40

## 2023-10-26 RX ADMIN — GABAPENTIN 300 MG: 300 CAPSULE ORAL at 21:44

## 2023-10-26 RX ADMIN — SEVELAMER CARBONATE 800 MG: 800 TABLET, FILM COATED ORAL at 07:47

## 2023-10-26 RX ADMIN — TACROLIMUS 0.5 MG: 0.5 CAPSULE ORAL at 17:40

## 2023-10-26 RX ADMIN — LACOSAMIDE 50 MG: 50 TABLET, FILM COATED ORAL at 07:47

## 2023-10-26 RX ADMIN — APIXABAN 5 MG: 5 TABLET, FILM COATED ORAL at 20:22

## 2023-10-26 RX ADMIN — CAMPHOR AND MENTHOL: 5; 5 LOTION TOPICAL at 20:26

## 2023-10-26 RX ADMIN — SEVELAMER CARBONATE 800 MG: 800 TABLET, FILM COATED ORAL at 11:18

## 2023-10-26 RX ADMIN — Medication 1 CAPSULE: at 07:47

## 2023-10-26 RX ADMIN — SEVELAMER CARBONATE 800 MG: 800 TABLET, FILM COATED ORAL at 17:40

## 2023-10-26 RX ADMIN — PANTOPRAZOLE SODIUM 40 MG: 40 TABLET, DELAYED RELEASE ORAL at 07:47

## 2023-10-26 RX ADMIN — OXYCODONE HYDROCHLORIDE 5 MG: 5 TABLET ORAL at 04:07

## 2023-10-26 RX ADMIN — ACETAMINOPHEN 650 MG: 325 TABLET, FILM COATED ORAL at 07:57

## 2023-10-26 RX ADMIN — TACROLIMUS 0.5 MG: 0.5 CAPSULE ORAL at 07:47

## 2023-10-26 RX ADMIN — OXYCODONE HYDROCHLORIDE 5 MG: 5 TABLET ORAL at 20:22

## 2023-10-26 ASSESSMENT — ACTIVITIES OF DAILY LIVING (ADL)
ADLS_ACUITY_SCORE: 24
ADLS_ACUITY_SCORE: 22
ADLS_ACUITY_SCORE: 24
ADLS_ACUITY_SCORE: 22
ADLS_ACUITY_SCORE: 22
ADLS_ACUITY_SCORE: 24
ADLS_ACUITY_SCORE: 22
ADLS_ACUITY_SCORE: 24
ADLS_ACUITY_SCORE: 22
ADLS_ACUITY_SCORE: 24
ADLS_ACUITY_SCORE: 22
ADLS_ACUITY_SCORE: 22

## 2023-10-26 NOTE — PROGRESS NOTES
Admitted/transferred from:   2 RN full   skin assessment completed by Nargis CENTENO RN and Marcia ROSS RN.  Skin assessment finding: small scabs/scratches on shin, thighs, and arms, bruised groin, L elbow open scab  Interventions/actions: Will continue to monitor

## 2023-10-26 NOTE — PLAN OF CARE
Transfer  Transferred to: 7C  Via:bed  Reason for transfer:Pt no longer appropriate for 6B- improved patient condition  Family: To be notified of transfer in AM  Belongings: Packed and sent with pt  Chart: Delivered with pt to next unit  Medications: Meds sent to new unit with pt  Report given to: Nargis Barakat RN  Pt status: VSS, BP 90s/50s. AxO x4. Up independently. PRN midodrine given x1, hydroxyzine x1, oxycodone x1, melatonin x1. 1.2L fluid restriction, 2g Na, and low fat diet. Hemodialysis MWF, anuric. Denies nausea.

## 2023-10-26 NOTE — PROGRESS NOTES
[facility]    Progress Note - Medicine Service, MAROON TEAM 4       Date of Admission:  10/23/2023    Assessment & Plan   Blanco Osborne is a 58 year old male admitted on 10/23/2023. 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 2016 now with progressive cirrhosis (liver bx 12/2022) with evidence of portal hypertension and ascites, paroxysmal atrial fibrillation on Eliquis, DM type 2, history of CVA, hypertension, CHRISTIAN on BiPAP, chronic pericardial effusion, ESRD on dialysis who presented to [facility] ED on 10/5/23 with chest discomfort and palpitations, admitted with severe sepsis with septic shock requiring pressors secondary to multifactorial pneumonia, atrial fibrillation with aberrancy s/p 2 failed attempts at cardioversion, worsening pericardial effusion on ECHO s/p pericardiocentesis 10/11, and PE on apixaban. 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 Has required multiple taps with subsequent chylous ascites findings despite tailored diet. Has also been on treatment for SBP.     Update today:  - Continue cefepime 1g q24h through 10/30  - SBP prophylaxis after cefepime completed  - Likely repeat paracentesis Saturday to monitor for improvement in PMNs / TAGs    # SBP   # Chylous ascites   # Abdominal pain   Ascites likely related to portal HTN in the setting of cirrhosis, further c/b disruption of lymphatics (d/t increased lymph flow and portal HTN seen with cirrhosis) leading to chylous ascites as reflective of persistently elevated triglyceride. Has been on different antibiotics (see admission note).  Diagnostic and therapeutic paracentesis done on 10/24 with cell count of 1035, ANC of 634 which is improving from previous value suggesting some improvement with antibiotics.   - Hepatology consulted, appreciate recs               -consider lymphoscintigraphy (hold for now given improvement in triglycerides)   - Repeat paracentesis 10/28  - Transplant Infectious disease consulted, appreciate recs  - Continue Cefepime 1g Q24h through 10/30        # Cirrhosis secondary to NAFLD s/p liver transplant 2016 now with recurrent cirrhosis with evidence of portal hypertension and ascites   #Immunosuppression  Patient follows with Dr. Simms, though notably has not been seen in years; last clinic visit 4/21/2020 with plan for 6 month follow up. Next appt 12/2023. Evidence of recurrent cirrhosis on liver biopsy 12/2022, he did have alcohol use post transplant and continues to have metabolic syndrome that can also contribute. Continues to have splenomegaly on recent CT.  No evidence of HCC.    -PTA tacrolimus 0.5mg BID (with goal of 3-5)  -Hepatology consulted appreciate recs      # Moderate protein-energy malnutrition  -Nutrition consulted,  appreciate recs  - Currently on 2gm NA and very low fat diet  - GI wanted to continue very low fat diet, as do not expect TG to change quickly   - Likely plan to continue very low fat (LCT) diet for 2-3 weeks then liberalize fat restriction for LCT challenge and repeat TG on next paracentesis to evaluate if chylous ascites persists.     #Left arm fistula sight  pain and swelling  Swelling and tenderness on the left arm fistula site which is concerning for fistula site infection. Blood cultures sent.  -10/23 Blood Cultures NGTD     # Concern for multifocal pneumonia, organism unspecified  - CT scan showed pulmonary infiltrates   - COVID 19/influenza/RSV negative  - Unable to provide sputum specimen  - Abx as detailed above     # Recurrent pericardial effusion  # Paroxysmal atrial fibrillation   # NSTEMI  Troponin did peak at 346 on 10/05 (thought to be due to demand because of shock and EKG did not had any ST elevation). ECHO 10/10 with recurrent pericardial effusion slightly larger than seen on ECHO 10/8 s/p  pericardiocentesis 10/11 with 560cc removed, s/p RHC 10/11 which showed normal right and left sided filling pressures, mild elevated pulmonary pressures and portal hypertension with hepatic vein pressure gradient of 10mmHg. Drain removed on 10/12. Repeat ECHO 10/17 with no recurrent pericardial effusion, LVEF >70%.  - If patient requires repeat pericardiocentesis in future then send for AFB/fungal culture, 16 S and 28 S  - Cardiology had signed off at previous hospital  - Continue PTA eliquis 5mg BID      # Pulmonary embolism  - Noted on CT scan   - Continue PTA eliquis 5mg BID     #Previous embolic stroke in 11/2022     # ESRD on HD MWF  # S/p left AV fistula pseudoaneurysm repair, 8/2023  # LUE AV fistula infiltrated on 10/6  # On kidney transplant list  - Dialysis per nephrology  - Continue renal multivitamin  - Continue PTA sevelamer 800mg QID     # Severe sepsis with septic shock - due to multifactorial pneumonia -resolved  # Chronic hypotension, on midodrine  - Is normally on midodrine for chronic hypotension, was adjusted multiple times during this hospitalization. Now only on midodrine for dialysis.    - Off pressors since 10/7/2023  - Random cortisol level checked on 10/13, which was normal  - S/p 25 g of 25% albumin on 10/14     # Pain at multiple locations  Has pain in left upper extremity due to infiltration of AV fistula, pain in right upper extremity due to thrombophlebitis and frequent lab draws.  - Tylenol 650mg Q8h PRN (keep daily < 2g) for pain  - Oxycodone 5mg PO Q6 PRN for moderate pain  - Dilaudid 2mg PO Q6h PRN for severe pain      # Generalized weakness  - PT and OT ordered     # Probable CHRISTIAN  Per patient's wife, he had a sleep study about 10 years ago but does not have any CPAP or BiPAP at home.  - CPAP ordered for sleep  - Should undergo sleep study as outpatient after discharge     # Anemia of chronic disease  Hgb baseline between 8-9  - Hemoglobin is stable on monitoring    # Chronic  Thrombocytopenia  Likely due to liver disease.   - Continue to monitor     # Restless leg syndrome  -Continue PTA ropinirole     # Seizure disorder  - Continue PTA Vimpat     # GERD  -Continue PTA PPI        Diet: Combination Diet Renal Diet (dialysis); 2 gm NA Diet; Very Low Fat Diet (up to 10g)  Snacks/Supplements Adult: Ensure Clear; With Meals  Snacks/Supplements Adult: Gelatein Plus; Between Meals  Room Service  Fluid restriction 1200 ML FLUID    DVT Prophylaxis: DOAC  Nixon Catheter: Not present  Fluids: PO  Lines: PRESENT      Hemodialysis Vascular Access Right Subclavian-Site Assessment: WDL  Hemodialysis Vascular Access Arteriovenous fistula Left Forearm-Site Assessment: WDL except;Tender;Painful      Cardiac Monitoring: None  Code Status: Full Code      Clinically Significant Risk Factors         # Hyponatremia: Lowest Na = 127 mmol/L in last 2 days, will monitor as appropriate      # Hypoalbuminemia: Lowest albumin = 2.8 g/dL at 10/22/2023  9:59 AM, will monitor as appropriate  # Coagulation Defect: INR = 1.70 (Ref range: 0.85 - 1.15) and/or PTT = 47 Seconds (Ref range: 22 - 38 Seconds), will monitor for bleeding  # Thrombocytopenia: Lowest platelets = 120 in last 2 days, will monitor for bleeding   # Hypertension: Noted on problem list        # Overweight: Estimated body mass index is 25.8 kg/m  as calculated from the following:    Height as of 10/13/23: 1.829 m (6').    Weight as of this encounter: 86.3 kg (190 lb 4.1 oz)., PRESENT ON ADMISSION  # Moderate Malnutrition: based on nutrition assessment, PRESENT ON ADMISSION   # Financial/Environmental Concerns: none         Disposition Plan      Expected Discharge Date: 10/29/2023      Destination: home;home with family  Discharge Comments: Dispo: TBD, pending recs  Plan: Sepsis - IV Abx; OP HD - [facility]  Progress: H2H transfer via [facility];        The patient's care was discussed with the Attending Physician, Dr. Aguirre .    Deejay Yoon MD  Medicine  Service, MANUEL TEAM 4  [facility]  Securely message with [employer] (more info)  Text page via [facility] Paging/Directory   See signed in provider for up to date coverage information  ______________________________________________________________________    Interval History     Nursing notes reviewed. No acute events overnight. This morning reports continued abdominal distention but generally has had improved abdominal pain and nausea. No fevers, chills, shortness of breath.    Physical Exam   Vital Signs: Temp: 97.9  F (36.6  C) Temp src: Oral BP: 105/65 Pulse: 61   Resp: 16 SpO2: 99 % O2 Device: None (Room air)    Weight: 190 lbs 4.11 oz    General Appearance: Appears comfortable, no acute distress  Respiratory: clear to auscultation bilaterally, breathing comfortably on room air  Cardiovascular: regular rate and rhythm, no MRG  GI: soft, mildly distended, non-tender to palpation  Skin: no visible rashes / lesions    Medical Decision Making       Please see A&P for additional details of medical decision making.      Data     I have personally reviewed the following data over the past 24 hrs:    5.2  \   7.7 (L)   / 154     128 (L) 95 (L) 45.0 (H) /  100 (H)   4.4 24 4.93 (H) \     ALT: <5 AST: 16 AP: 167 (H) TBILI: 0.3   ALB: 3.0 (L) TOT PROTEIN: 5.9 (L) LIPASE: N/A     Procal: N/A CRP: 30.40 (H) Lactic Acid: N/A       INR:  1.70 (H) PTT:  N/A   D-dimer:  N/A Fibrinogen:  N/A       Imaging results reviewed over the past 24 hrs:   No results found for this or any previous visit (from the past 24 hour(s)).

## 2023-10-26 NOTE — PROVIDER NOTIFICATION
Time of notification: 8:20 PM  Provider notified: Sheyla Taylor.  Patient status: FYI, BP 89/46. PRN midodrine 10mg given. Will recheck.    Temp:  [97.2  F (36.2  C)-98.5  F (36.9  C)] 98.5  F (36.9  C)  Pulse:  [68-83] 72  Resp:  [11-24] 18  BP: ()/(46-71) 89/46  Cuff Mean (mmHg):  [63-77] 66  SpO2:  [95 %-100 %] 98 %

## 2023-10-26 NOTE — PLAN OF CARE
Goal Outcome Evaluation:      Plan of Care Reviewed With: patient    Overall Patient Progress: no changeOverall Patient Progress: no change    Outcome Evaluation: 9800-5191, pt transferred to unit from . 1200 ml fluid restriction, pt aware. Up independently.  A&ox4, Vss on RA except soft bps. Systolic <90, PRN midodrine given. C/o pain in L arm where fisutla is and chest pain, oxy given. Oliguric and pt reports last BM was yesterday. HD on MWF, continue to monitor.   Pt oriented to unit and given call light.   Pt's belongings: black duffel bag, cooler, wallet, cell phone, clothes.

## 2023-10-26 NOTE — PLAN OF CARE
Goal Outcome Evaluation:       Admitted for sepsis. Administered IV abx. Pt c/o pain in fistula site. Administered tylenol & oxycodone. Pt & family curious about next steps for fistula, MD notified. Plan to continue IV abx through 10/30 & para on Saturday.

## 2023-10-26 NOTE — PROGRESS NOTES
[facility]  Transplant Infectious Disease Progress Note - Sign off     Patient:  Blanco Osborne, Date of birth 1965, Medical record number [id number]  Date of Visit:  10/26/2023         Assessment and Recommendations:   Recommendations:  - Continue Cefepime 1g q24hrs (renally adjusted for HD) to complete a 14 day course of treatment for SBP (through 10/30/2023).   - After completion of cefepime course, will need SBP prophylaxis started.   - Encourage seasonal Covid vaccination about 2 weeks or later after cefepime completed.   - No specific ID followup is needed outpatient, as he has good transplant hepatology followup already scheduled with Dr. Alban Simms for 12/29/2023.     Transplant Infectious Disease will sign off. Please call if new questions or issues arise.     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   Infectious Disease issues include:  - Peritonitis. He presented to [facility] on 10/5/23 with chest discomfort and palpitations. Admitted with septic shock 2/2 multifocal pneumonia requiring pressors c/b A fib with aberrancy s/p 2 attempts at cardioversion which failed, worsening pericardial effusion s/p pericardiocentesis 10/11, and PE on heparin. 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 Has needed multiple paracenteses since arrival. 10/6/2023 1119 WBC/ 1% neuts and trigs >400, 10/10/2023 761 WBC/ 4% neuts, 10/12/2023 719/1% neuts. SBP on 10/16/2023 paracentesis of chylous ascites: 10/16/2023 ascites with 3844 WBC/83% neuts, aerobic culture negative, fungal culture NGTD, AFB Cx NGTD/stain negative, 10/19 without diagnostics. On Cefepime since  10/17/2023. 10/24/2023 ascites fluid with 634 PMNs. Continue Cefepime 1g q24hrs (renally adjusted for HD) to complete a 14 day course of treatment for SBP (through 10/30).   - Pericardial effusion. S/p pericardiocentesis 10/11 with 2558 WBC/62% neutrophils, cultures negative. Pericardial drain removed 10/12. Quant gold negative, HIV screen negative, EBV DNA PCR <500/log <2.7, CMV DNA PCR not detected.  - Hx of multifocal pneumonia with septic shock. Hypotensive requiring pressor support and ICU admission 10/5. Did have A fib with 2 failed cardioversions, initiated on amiodarone with improvement to NSR. CT chest with PE and pulmonary infiltrates c/f pneumonia and is now s/p broad spectrum abx as above. No sputum collected or organism identified. Was negative for COVID/influenza/RSV on PCR. Completed course of Zosyn 10/5-12 + 2 days of Augmentin.  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 10/14/2023 check of CMV & EBV DNA neg.   - Immunization status: COVID vaccinated x2, influenza completed for 2023, due for seasonal COVID, shingrix #2 11/16/2   - Gamma globulin status: unknown  - Isolation status: Good hand hygiene.    Margaux Pickard MD. Pager [phone]         Interval History:   Since Blanco Osborne was last seen by me on 10/25/2023, he is doing about the same. He was transferred to the general medicine vargas from intermediate care since he is not requiring as much nursing needs; this happened in the middle of the night so he had interrupted sleep. He continues on a diet with 1200 ml fluid restriction. Systolic BP a little soft  at <90, so midodrine given. He has some mild pain in the L arm where his fisutla is.       Transplants:  9/20/2016 (Liver), Postoperative day:  2592.  Coordinator Tamara Khan    Review of Systems:  CONSTITUTIONAL:  no fever.   EYES:   ENT:    RESPIRATORY:    CARDIOVASCULAR:    GASTROINTESTINAL:  daily BM  GENITOURINARY:  Oliguric, on qMWF dialysis  HEME:  no need for transfusions.  INTEGUMENT:  no new rash.   NEURO:  he is alert & oriented.          Current Medications & Allergies:      apixaban ANTICOAGULANT  5 mg Oral BID    ceFEPIme  1 g Intravenous Q24H    gabapentin  300 mg Oral At Bedtime    Lacosamide  100 mg Oral QPM    lacosamide  50 mg Oral QAM    multivitamin RENAL  1 capsule Oral Daily    pantoprazole  40 mg Oral QAM AC    sevelamer carbonate  800 mg Oral TID w/meals    tacrolimus  0.5 mg Oral BID IS       No Known Allergies         Physical Exam:   Patient Vitals for the past 24 hrs:   BP Temp Temp src Pulse Resp SpO2 Weight   10/26/23 0700 91/58 -- -- -- -- -- --   10/26/23 0651 (!) 89/57 -- -- -- -- -- --   10/26/23 0635 (!) 84/48 98.5  F (36.9  C) Oral 69 18 97 % --   10/26/23 0323 93/59 98.3  F (36.8  C) Oral 73 20 -- --   10/26/23 0247 -- -- -- -- -- -- 86.3 kg (190 lb 4.1 oz)   10/25/23 2318 94/58 97.3  F (36.3  C) Oral 71 18 96 % --   10/25/23 2059 93/58 -- -- -- -- -- --   10/25/23 2000 (!) 89/46 98.5  F (36.9  C) Oral 72 18 98 % --   10/25/23 1452 96/57 97.2  F (36.2  C) Oral -- 20 99 % --   10/25/23 1130 101/63 -- -- 70 24 100 % --   10/25/23 1125 92/59 -- -- 71 12 100 % --   10/25/23 1120 -- -- -- 76 22 99 % --   10/25/23 1115 (!) 87/58 -- -- 68 12 100 % --   10/25/23 1100 (!) 87/57 -- -- 68 16 100 % --   10/25/23 1045 94/57 -- -- 69 13 99 % --   10/25/23 1030 95/64 -- -- 71 13 98 % --   10/25/23 1015 95/63 -- -- 72 16 99 % --   10/25/23 1000 101/65 -- -- 73 16 97 % --     Ranges for vital signs:  Temp:  [97.2  F (36.2  C)-98.5  F (36.9  C)] 98.5  F (36.9  C)  Pulse:  [68-76]  69  Resp:  [12-24] 18  BP: ()/(46-65) 91/58  Cuff Mean (mmHg):  [66-77] 66  SpO2:  [96 %-100 %] 97 %  Vitals:    10/24/23 0034 10/25/23 0200 10/26/23 0247   Weight: 85.7 kg (188 lb 15 oz) 85.9 kg (189 lb 6.4 oz) 86.3 kg (190 lb 4.1 oz)       Physical Examination:  GENERAL:  well-developed, well-nourished man, resting in bed in no acute distress.  HEAD:  Head is normocephalic, atraumatic   EYES:  Eyes have anicteric sclerae   ENT:  Oropharynx is moist without exudates or ulcers. Tongue is midline  NECK:  Supple.   LUNGS:  Clear to auscultation bilateral.   CARDIOVASCULAR:  Regular rate and rhythm with + soft sys murmur  ABDOMEN:  Normal bowel sounds, soft, mildly distended from ascites with mild tenderness throughout.   SKIN:  No acute rashes. PIV Line in place without any surrounding erythema or exudate. Tunneled dialysis line in place & not accessed. L forearm AV fistula with good thrill. Various ecchymoses.   NEUROLOGIC:  Grossly nonfocal. Active x4 extremities         Laboratory Data:     Inflammatory Markers    Recent Labs   Lab Test 10/24/23  0537 10/14/23  1042 01/22/23  0528 01/19/23  0627 11/22/22  0025 03/01/16  0648   CRP  --   --   --   --  22.3*  --    CRPI 31.40*  --  138.42*   < >  --   --    ELPIDIO  --  11.7  --   --   --   --    PSA  --   --   --   --   --  0.02    < > = values in this interval not displayed.       Metabolic Studies       Recent Labs   Lab Test 10/26/23  0457 10/25/23  0440 10/24/23  0537 10/23/23  0654 10/11/23  1746 10/11/23  1430 10/05/23  1611 10/05/23  1244 10/05/23  0248 10/05/23  0245 08/16/23  0607 08/10/23  1032 01/22/23  1147 01/22/23  0528   * 127*   < > 121*   < >  --    < >  --    < > 136   < > 141   < > 136   POTASSIUM 4.4 4.7   < > 5.5*   < >  --    < >  --    < > 3.8   < > 4.3   < > 3.8   CHLORIDE 95* 94*   < > 87*   < >  --    < >  --   --  90*   < > 95*   < > 94*   CO2 24 20*   < > 19*   < >  --    < >  --   --  24   < > 32*   < > 29   ANIONGAP 9 13   < >  15   < >  --    < >  --   --  22*   < > 14   < > 13   BUN 45.0* 68.5*   < > 87.9*   < >  --    < >  --   --  44.5*   < > 45.6*   < > 33.6*   CR 4.93* 7.08*   < > 8.03*   < >  --    < >  --   --  5.77*   < > 5.76*   < > 2.68*   GFRESTIMATED 13* 8*   < > 7*   < >  --    < >  --   --  11*   < > 11*   < > 27*   * 144*   < > 107*   < >  --    < >  --   --  127*   < > 104*   < > 90   A1C  --   --   --   --   --   --   --   --   --   --   --  4.7  --   --    KELLY 8.5* 8.6   < > 8.8   < >  --    < >  --   --  9.4   < > 9.5   < > 9.1   PHOS  --   --   --  3.1  --   --    < >  --   --  5.2*   < >  --    < >  --    MAG  --   --   --   --   --   --   --   --   --  2.1  --   --    < >  --    LACT  --   --   --   --   --  0.4  --  0.6*   < >  --   --   --    < >  --    PCAL  --   --   --   --   --   --   --   --   --   --   --   --   --  1.78*    < > = values in this interval not displayed.       Hepatic Studies    Recent Labs   Lab Test 10/26/23  0457 10/25/23  0440 10/24/23  0537 10/07/23  0431 10/06/23  0533 01/14/23  0615 01/13/23  0553   BILITOTAL 0.3 0.3 0.4   < > 0.6   < >  --    DBIL  --   --   --   --  0.34*   < >  --    ALKPHOS 167* 170* 189*   < > 213*   < >  --    PROTTOTAL 5.9* 6.0* 6.0*   < > 6.6   < > 6.5   ALBUMIN 3.0* 3.1* 3.1*   < > 3.3*   < >  --    AST 16 18 21   < > 12   < >  --    ALT <5 <5 <5   < > 10   < >  --    LDH  --   --  172  --   --   --  217    < > = values in this interval not displayed.       Pancreatitis testing    Recent Labs   Lab Test 10/13/23  0850 10/31/17  1037 09/22/16  0741 09/20/16  1259   AMYLASE  --   --  72 86   LIPASE 24  --  63*  --    TRIG 65   < >  --   --     < > = values in this interval not displayed.       Hematology Studies   Recent Labs   Lab Test 10/26/23  0525 10/25/23  0440 10/24/23  0537 10/23/23  0654 10/05/23  0251 10/05/23  0245 11/22/22  0423 11/22/22  0025 11/16/22  0147 11/15/22  1822 11/12/22  1147 08/05/20  1230   WBC 5.2 4.2 5.2 6.0   < > 9.9   < > 47.0*    < > 55.8*   < >  --    25816  --   --   --   --   --   --   --   --   --   --   --  2.8*   ANEU  --   --   --   --   --   --   --  39.0*  --  52.5*   < >  --    ANEUTAUTO  --   --   --  3.1  --  4.0   < >  --   --   --    < >  --    ALYM  --   --   --   --   --   --   --  3.3  --  1.7   < >  --    ALYMPAUTO  --   --   --  1.7  --  4.4   < >  --   --   --    < >  --    MARCELO  --   --   --   --   --   --   --  3.8*  --  0.0   < >  --    AMONOAUTO  --   --   --  0.8  --  1.1   < >  --   --   --    < >  --    AEOS  --   --   --   --   --   --   --  0.0  --  0.0   < >  --    AEOSAUTO  --   --   --  0.3  --  0.3   < >  --   --   --    < >  --    ABSBASO  --   --   --  0.1  --  0.0   < >  --   --   --    < >  --    HGB 7.7* 7.7* 8.6* 9.0*   < > 11.4*   < > 10.5*   < > 11.5*   < >  --    76994  --   --   --   --   --   --   --   --   --   --   --  13.9*   HCT 24.6* 24.4* 27.8* 28.0*   < > 35.2*   < > 32.6*   < > 34.5*   < >  --     120* 151 166   < > 146*   < > 229   < > 87*   < >  --    68620  --   --   --   --   --   --   --   --   --   --   --  103*    < > = values in this interval not displayed.       Clotting Studies    Recent Labs   Lab Test 10/26/23  0525 10/17/23  0633 10/14/23  1042 10/05/23  1244 10/05/23  0245 08/16/23  0734   INR 1.70* 2.00*  --   --  1.55* 1.29*   PTT  --   --  47*   < > 48*  --     < > = values in this interval not displayed.       Iron Testing    Recent Labs   Lab Test 10/26/23  0525 10/25/23  0440 10/24/23  0537 07/11/23  1057 04/23/23  0812 04/22/23  0821 04/14/23  0725 04/08/23  0859 04/06/23  0808 12/14/22  0412 12/13/22  0427   IRON  --   --  38*  --   --   --  60*  --  78   < > 37   FEB  --   --  190*  --   --   --  210*  --   --    < > 138*   IRONSAT  --   --  20  --   --   --  29  --   --    < > 27   HOSSEIN  --   --  423*  --   --   --  286  --   --    < > 212   *   < > 101*   < >  --   --  97   < >  --    < > 113*   FOLIC  --   --   --   --  >40.0*  --   --   --   --    <  >  --    B12  --   --   --   --   --  989  --   --   --   --   --    RETP  --   --   --   --   --   --   --   --   --   --  6.5*   RETICABSCT  --   --   --   --   --   --   --   --   --   --  0.124*    < > = values in this interval not displayed.       Arterial Blood Gas Testing    Recent Labs   Lab Test 03/12/23  1117 03/10/23  1457 03/08/23  1640 03/07/23  1657 03/06/23  1253 03/05/23  2059 03/05/23  1605 12/20/22  0719 11/25/22  1947 11/23/22  1107 11/23/22  0442 11/22/22  1839   PH  --   --   --   --   --  7.30*  --  7.43  --  7.34* 7.47* 7.47*   PCO2  --   --   --   --   --  57*  --  40  --  43 32* 33*   PO2  --   --   --   --   --  94  --  118*  --  99 100 104   HCO3  --   --   --   --   --  28  --  27  --  23 23 24   O2PER 0 21 21 25   < > 30   < > 30   < > 30 40 40    < > = values in this interval not displayed.        Thyroid Studies     Recent Labs   Lab Test 03/25/23  1058 03/01/16  0648   TSH 4.89* 1.76   T4 0.92  --        Urine Studies     Recent Labs   Lab Test 11/25/22  1859 10/31/17  1038 09/24/16  1055 09/23/16  1458 03/01/16  0644   URINEPH 5.5 5.0 5.0 7.5* Duplicate request  SEE [id number]    5.0   NITRITE Negative Negative Negative Negative Duplicate request  SEE [id number]  *  Negative   LEUKEST Small* Negative Moderate* Large* Duplicate request  SEE [id number]  *  Negative   WBCU  --  1 13* 103* 2*   UWBCLM  --   --  Present*  --   --        CSF testing     Recent Labs   Lab Test 12/21/22  1526   CWBC 3   CRBC 58*   CGLU 69   CTP 71*       Medication levels    Recent Labs   Lab Test 10/24/23  0537 11/29/22  0439 11/16/22  0546   VANCOMYCIN  --   --  13.6   TACROL 3.7*   < >  --     < > = values in this interval not displayed.       Body fluid stats    Recent Labs   Lab Test 10/24/23  1059 10/16/23  1511 10/12/23  1113 03/06/23  1503 01/24/23  1615 01/13/23  1259 11/26/22  1123 09/20/16  1901   FCOL Orange* Orange* Yellow   < > Yellow Red*  Yellow   < >  --    FAPR Cloudy* Turbid* Turbid*   < > Turbid*  Cloudy*  Turbid*   < >  --    FRBC  --   --   --   --   --  96  60,000  --   --    FWBC 1,035 3,844 719   < > 458 7,918  180   < >  --    FNEU 61 83 1   < > 1 93  16   < >  --    FLYM 20 7 88   < > 61 7  42   < >  --    FMONO  --  10 11   < > 36 40   < >  --    FBAS  --   --   --   --  1  --   --   --    FALB 1.1 1.1 1.0   < > 0.6 0.5   < >  --    FTP 2.4  2.3 2.6 2.4   < > 1.8 3.1  1.4   < >  --    GS  --   --   --   --   --   --   --  No organisms seen  No PMNs seen      < > = values in this interval not displayed.       Microbiology:  Last Culture results   Culture   Date Value Ref Range Status   10/24/2023 No growth, less than 1 day  Preliminary   10/24/2023 No anaerobic organisms isolated after 1 day  Preliminary   10/24/2023 No growth, less than 1 day  Preliminary   10/24/2023 No growth after 1 day  Preliminary   10/23/2023 No growth after 2 days  Preliminary   10/23/2023 No growth after 2 days  Preliminary   10/16/2023 No Growth  Final   10/16/2023 No growth after 9 days  Preliminary   10/12/2023 No Growth  Final   10/11/2023 No Growth  Final   10/10/2023 No Growth  Final   10/06/2023 No Growth  Final   10/06/2023 No anaerobic organisms isolated  Final   10/06/2023 No growth after 19 days  Preliminary   10/05/2023 No Growth  Final   10/05/2023 No Growth  Final   09/29/2023 No Growth  Final   03/06/2023 No Growth  Final   01/31/2023 No Growth  Final   01/31/2023 No Growth  Final     Culture Micro   Date Value Ref Range Status   09/28/2016 No VRE isolated  Final   09/23/2016 No growth  Final   09/20/2016   Final    Culture negative for acid fast bacilli  Assayed at [facility] [address]     09/20/2016 No anaerobes isolated  Final   09/20/2016 No growth  Final   09/20/2016 Culture negative after 4 weeks  Final           Last checks of Clostridioides difficile testing  Recent Labs   Lab Test 10/10/23  1711 01/23/23  1656   CDBPCT Negative Negative       Virology:  Coronavirus-19  testing    Recent Labs   Lab Test 10/17/23  2010 10/05/23  0727 01/24/23  1729 11/19/22  1546   [id number] Negative Negative Negative Negative       Respiratory virus (non-coronavirus-19) testing    Recent Labs   Lab Test 10/14/23  1808 10/05/23  0727   IFLUA Not Detected  --    INFZA  --  Negative   FLUAH1 Not Detected  --    VL1633 Not Detected  --    FLUAH3 Not Detected  --    IFLUB Not Detected  --    INFZB  --  Negative   PIV1 Not Detected  --    PIV2 Not Detected  --    PIV3 Not Detected  --    PIV4 Not Detected  --    IRSV  --  Negative   RSVA Not Detected  --    RSVB Not Detected  --    HMPV Not Detected  --    RHINEV Not Detected  --    ADENOV Not Detected  --    CORONA Not Detected  --        EBV DNA Copies/mL   Date Value Ref Range Status   10/14/2023 <500 (A) Not Detected copies/mL Final     Comment:     EBV DNA Detected below the reportable range of 500 copies/mL       Imaging:  Recent Results (from the past 48 hour(s))   US Upper Extremity Non Vascular Left    Narrative    Exam: Upper extremity soft tissue nonvascular, 10/24/2023 10:32 AM    Indication: Evaluate for fluid collection associated with left upper  extremity dialysis fistula.    Comparison: 3/16/2023    Findings:   Heterogeneously hypoechoic collection measuring 3.2 x 1.8 x 5.8 cm in  the lateral left upper arm. There is no internal vascularity or  surrounding hyperemia/edema. The collection is not contiguous with the  adjacent vasculature. No worrisome soft tissue component.      Impression    Impression: 3.2 x 1.8 x 5.8 cm hematoma in the lateral left upper  extremity soft tissues. There is no internal flow to suggest  pseudoaneurysm.    I have personally reviewed the examination and initial interpretation  and I agree with the findings.    CANELO SHULTZ MD         SYSTEM ID:  [id number]   POC US GUIDE FOR PARACENTESIS    Impression    US Indication: decompensated liver disease and abdominal distension    FINDINGS:  Limited abdominal  ultrasound was performed and demonstrated an adequate fluid collection on the RLQ of the abdomen. Doppler of the skin demonstrated an area at this site without significant vasculature. A paracentesis at this site was subsequently performed.

## 2023-10-27 LAB
ALBUMIN SERPL BCG-MCNC: 2.9 G/DL (ref 3.5–5.2)
ALP SERPL-CCNC: 178 U/L (ref 40–129)
ALT SERPL W P-5'-P-CCNC: <5 U/L (ref 0–70)
ANION GAP SERPL CALCULATED.3IONS-SCNC: 12 MMOL/L (ref 7–15)
AST SERPL W P-5'-P-CCNC: 14 U/L (ref 0–45)
BILIRUB SERPL-MCNC: 0.3 MG/DL
BUN SERPL-MCNC: 67.8 MG/DL (ref 8–23)
CALCIUM SERPL-MCNC: 8.9 MG/DL (ref 8.6–10)
CHLORIDE SERPL-SCNC: 91 MMOL/L (ref 98–107)
CREAT SERPL-MCNC: 6.42 MG/DL (ref 0.67–1.17)
DEPRECATED HCO3 PLAS-SCNC: 23 MMOL/L (ref 22–29)
EGFRCR SERPLBLD CKD-EPI 2021: 9 ML/MIN/1.73M2
ERYTHROCYTE [DISTWIDTH] IN BLOOD BY AUTOMATED COUNT: 16.8 % (ref 10–15)
GLUCOSE SERPL-MCNC: 94 MG/DL (ref 70–99)
HCT VFR BLD AUTO: 26.8 % (ref 40–53)
HGB BLD-MCNC: 8.4 G/DL (ref 13.3–17.7)
INR PPP: 1.67 (ref 0.85–1.15)
MCH RBC QN AUTO: 31.7 PG (ref 26.5–33)
MCHC RBC AUTO-ENTMCNC: 31.3 G/DL (ref 31.5–36.5)
MCV RBC AUTO: 101 FL (ref 78–100)
PLATELET # BLD AUTO: 131 10E3/UL (ref 150–450)
POTASSIUM SERPL-SCNC: 5.4 MMOL/L (ref 3.4–5.3)
PROT SERPL-MCNC: 6 G/DL (ref 6.4–8.3)
RBC # BLD AUTO: 2.65 10E6/UL (ref 4.4–5.9)
SODIUM SERPL-SCNC: 126 MMOL/L (ref 135–145)
WBC # BLD AUTO: 4.6 10E3/UL (ref 4–11)

## 2023-10-27 PROCEDURE — 36415 COLL VENOUS BLD VENIPUNCTURE: CPT | Performed by: STUDENT IN AN ORGANIZED HEALTH CARE EDUCATION/TRAINING PROGRAM

## 2023-10-27 PROCEDURE — 250N000013 HC RX MED GY IP 250 OP 250 PS 637: Performed by: STUDENT IN AN ORGANIZED HEALTH CARE EDUCATION/TRAINING PROGRAM

## 2023-10-27 PROCEDURE — 258N000003 HC RX IP 258 OP 636: Performed by: STUDENT IN AN ORGANIZED HEALTH CARE EDUCATION/TRAINING PROGRAM

## 2023-10-27 PROCEDURE — 250N000013 HC RX MED GY IP 250 OP 250 PS 637

## 2023-10-27 PROCEDURE — 250N000011 HC RX IP 250 OP 636

## 2023-10-27 PROCEDURE — 634N000001 HC RX 634: Mod: JZ | Performed by: STUDENT IN AN ORGANIZED HEALTH CARE EDUCATION/TRAINING PROGRAM

## 2023-10-27 PROCEDURE — 250N000012 HC RX MED GY IP 250 OP 636 PS 637: Performed by: HOSPITALIST

## 2023-10-27 PROCEDURE — 99233 SBSQ HOSP IP/OBS HIGH 50: CPT | Mod: GC | Performed by: STUDENT IN AN ORGANIZED HEALTH CARE EDUCATION/TRAINING PROGRAM

## 2023-10-27 PROCEDURE — 120N000002 HC R&B MED SURG/OB UMMC

## 2023-10-27 PROCEDURE — 90935 HEMODIALYSIS ONE EVALUATION: CPT

## 2023-10-27 PROCEDURE — 90935 HEMODIALYSIS ONE EVALUATION: CPT | Performed by: STUDENT IN AN ORGANIZED HEALTH CARE EDUCATION/TRAINING PROGRAM

## 2023-10-27 PROCEDURE — 85610 PROTHROMBIN TIME: CPT | Performed by: STUDENT IN AN ORGANIZED HEALTH CARE EDUCATION/TRAINING PROGRAM

## 2023-10-27 PROCEDURE — 99233 SBSQ HOSP IP/OBS HIGH 50: CPT | Performed by: NURSE PRACTITIONER

## 2023-10-27 PROCEDURE — 80053 COMPREHEN METABOLIC PANEL: CPT | Performed by: STUDENT IN AN ORGANIZED HEALTH CARE EDUCATION/TRAINING PROGRAM

## 2023-10-27 PROCEDURE — 85027 COMPLETE CBC AUTOMATED: CPT | Performed by: STUDENT IN AN ORGANIZED HEALTH CARE EDUCATION/TRAINING PROGRAM

## 2023-10-27 PROCEDURE — 999N000157 HC STATISTIC RCP TIME EA 10 MIN

## 2023-10-27 PROCEDURE — 94660 CPAP INITIATION&MGMT: CPT

## 2023-10-27 PROCEDURE — 250N000013 HC RX MED GY IP 250 OP 250 PS 637: Performed by: HOSPITALIST

## 2023-10-27 RX ORDER — ONDANSETRON 4 MG/1
4 TABLET, ORALLY DISINTEGRATING ORAL EVERY 6 HOURS PRN
Status: DISCONTINUED | OUTPATIENT
Start: 2023-10-27 | End: 2023-11-01 | Stop reason: HOSPADM

## 2023-10-27 RX ORDER — LIDOCAINE HYDROCHLORIDE 20 MG/ML
1 JELLY TOPICAL 2 TIMES DAILY PRN
Status: COMPLETED | OUTPATIENT
Start: 2023-10-27 | End: 2023-10-29

## 2023-10-27 RX ORDER — ONDANSETRON 2 MG/ML
4 INJECTION INTRAMUSCULAR; INTRAVENOUS EVERY 6 HOURS PRN
Status: DISCONTINUED | OUTPATIENT
Start: 2023-10-27 | End: 2023-11-01 | Stop reason: HOSPADM

## 2023-10-27 RX ADMIN — MIDODRINE HYDROCHLORIDE 20 MG: 5 TABLET ORAL at 23:01

## 2023-10-27 RX ADMIN — EPOETIN ALFA-EPBX 6000 UNITS: 10000 INJECTION, SOLUTION INTRAVENOUS; SUBCUTANEOUS at 11:16

## 2023-10-27 RX ADMIN — MIDODRINE HYDROCHLORIDE 20 MG: 5 TABLET ORAL at 10:50

## 2023-10-27 RX ADMIN — APIXABAN 5 MG: 5 TABLET, FILM COATED ORAL at 21:10

## 2023-10-27 RX ADMIN — Medication: at 11:15

## 2023-10-27 RX ADMIN — OXYCODONE HYDROCHLORIDE 5 MG: 5 TABLET ORAL at 18:32

## 2023-10-27 RX ADMIN — SEVELAMER CARBONATE 800 MG: 800 TABLET, FILM COATED ORAL at 07:56

## 2023-10-27 RX ADMIN — OXYCODONE HYDROCHLORIDE 5 MG: 5 TABLET ORAL at 05:19

## 2023-10-27 RX ADMIN — PANTOPRAZOLE SODIUM 40 MG: 40 TABLET, DELAYED RELEASE ORAL at 07:56

## 2023-10-27 RX ADMIN — Medication 1 CAPSULE: at 07:56

## 2023-10-27 RX ADMIN — TACROLIMUS 0.5 MG: 0.5 CAPSULE ORAL at 07:56

## 2023-10-27 RX ADMIN — MIDODRINE HYDROCHLORIDE 10 MG: 5 TABLET ORAL at 13:02

## 2023-10-27 RX ADMIN — CEFEPIME HYDROCHLORIDE 1 G: 1 INJECTION, POWDER, FOR SOLUTION INTRAMUSCULAR; INTRAVENOUS at 17:54

## 2023-10-27 RX ADMIN — SEVELAMER CARBONATE 800 MG: 800 TABLET, FILM COATED ORAL at 17:54

## 2023-10-27 RX ADMIN — SEVELAMER CARBONATE 800 MG: 800 TABLET, FILM COATED ORAL at 16:24

## 2023-10-27 RX ADMIN — LACOSAMIDE 50 MG: 50 TABLET, FILM COATED ORAL at 07:56

## 2023-10-27 RX ADMIN — MIDODRINE HYDROCHLORIDE 20 MG: 5 TABLET ORAL at 05:51

## 2023-10-27 RX ADMIN — SODIUM CHLORIDE 300 ML: 9 INJECTION, SOLUTION INTRAVENOUS at 11:14

## 2023-10-27 RX ADMIN — OXYCODONE HYDROCHLORIDE 5 MG: 5 TABLET ORAL at 11:03

## 2023-10-27 RX ADMIN — APIXABAN 5 MG: 5 TABLET, FILM COATED ORAL at 07:56

## 2023-10-27 RX ADMIN — ACETAMINOPHEN 650 MG: 325 TABLET, FILM COATED ORAL at 21:15

## 2023-10-27 RX ADMIN — LACOSAMIDE 100 MG: 50 TABLET, FILM COATED ORAL at 21:10

## 2023-10-27 RX ADMIN — GABAPENTIN 300 MG: 300 CAPSULE ORAL at 21:10

## 2023-10-27 RX ADMIN — TACROLIMUS 0.5 MG: 0.5 CAPSULE ORAL at 17:54

## 2023-10-27 RX ADMIN — ONDANSETRON 4 MG: 2 INJECTION INTRAMUSCULAR; INTRAVENOUS at 17:59

## 2023-10-27 RX ADMIN — MIDODRINE HYDROCHLORIDE 10 MG: 5 TABLET ORAL at 11:47

## 2023-10-27 RX ADMIN — SODIUM CHLORIDE 250 ML: 9 INJECTION, SOLUTION INTRAVENOUS at 11:15

## 2023-10-27 ASSESSMENT — ACTIVITIES OF DAILY LIVING (ADL)
ADLS_ACUITY_SCORE: 26
ADLS_ACUITY_SCORE: 22
ADLS_ACUITY_SCORE: 26
ADLS_ACUITY_SCORE: 26
ADLS_ACUITY_SCORE: 22
ADLS_ACUITY_SCORE: 22
ADLS_ACUITY_SCORE: 26
ADLS_ACUITY_SCORE: 26
ADLS_ACUITY_SCORE: 22

## 2023-10-27 NOTE — PROGRESS NOTES
Patient is in dialysis.  Oxycodone given right before the patient left for dialysis. The team came by after the patient was gone and nurse updated them on the patient's concern and pain on the left arm site of the fistula and they recommended to apply warm pack to the site and they will talk to the nephrology team  too. Continue to monitor.   If you are a smoker, it is important for your health to stop smoking. Please be aware that second hand smoke is also harmful.

## 2023-10-27 NOTE — PROGRESS NOTES
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 s/p liver transplant  in 2016 now with progressive cirrhosis (liver bx 12/2022) with evidence of portal hypertension and ascites, paroxysmal atrial fibrillation on Eliquis, DM type 2, PEA arrest 12/2022, history of CVA, HTN, CHRISTIAN on BiPAP, ESRD on dialysis who presented to [facility] ED on 10/5/23 with chest discomfort and palpitations, admitted with severe sepsis with septic shock requiring pressors secondary to multifactorial pneumonia, atrial fibrillation with aberrancy s/p 2 failed attempts at cardioversion, worsening pericardial effusion on ECHO s/p pericardiocentesis 10/11, and PE started on IV heparin. 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 Has required multiple taps with subsequent chylous ascites findings despite tailored diet. Transferred to [facility] to be seen by liver transplant team. Being treated for SBP. Deemed not a liver tx candidate     # ESKD: HD dependent ~ 1yr. Dialyzes MWF at [facility] SLP with Dr. Bradshaw. EDW 85.5 kg. Access: tunneled RIJ (has recently revised L AVF, unit has used twice using 17 g)  - Plan MWF dialysis  - using CVC for now      # Volume: EDW 85.5 kg (highly dependent on ascites at this point); newly developing chylous ascites; had RHC 10/11 at wt of 87.7 kg, normal R and L filling pressures, mild pulm HTN, L pericardial effusion; echo 10/17 with hyperkinetic EF > 70 and no pericardial effusion s/p drain  # Chronic hypotension  - requires midodrine before HD and often again during  - usual OP UF 2 to 3 kg  - s/p 2.7 kg para 10/24, next para planned for 10/28     # Hypervolemic hyponatremia: 126  - continue 1200 ml fluid restriction and 2g Na diet  - will improve with HD     # BMD: Ca 8's, alb 2.9, phos 3.1  - reduced sevelamer to 800 mg tid (not qid)     # Anemia of  CKD: hgb 7-8's; iron studies 10/24: ferritin 423, iron 38, IS 20  - continue epo 8K per run  - hold venofer in setting of infection, but post infection would benefit from iron loading     # SBP  - on cefepime  # s/p liver transplant: now with cirrhosis, portal HTN, chylous ascites        Recommendations were communicated to primary team via this note    Seen and discussed with Dr. Melody Olivas, PA   Division of Renal Disease and Hypertension  [phone]    Interval History :   Seen on dialysis, stable run so far for 3 kg UF goal. Being treated for SBP. Found out this AM he is not a liver tx candidate and therefore not a kidney tx candidate, he is understandably disappointed. No current n/v, CP, SOB, chills. Abd pain much better    Review of Systems:   4 point ROS neg other than as noted above    Physical Exam:   I/O last 3 completed shifts:  In: 100 [P.O.:100]  Out: -    BP (P) 101/59 (Cuff Size: Adult Regular)   Pulse 68   Temp 97.6  F (36.4  C) (Oral)   Resp 20   Wt 86.3 kg (190 lb 4.1 oz)   SpO2 98%   BMI 25.80 kg/m       GENERAL APPEARANCE: alert, NAD  EYES: no scleral icterus, pupils equal  Pulmonary: no increased WOB  CV: RRR   - Edema no peripheral  GI: soft, distended, NT  MS: no evidence of inflammation in joints, no muscle tenderness  : no gomez  SKIN: no rash, warm, dry, no cyanosis  NEURO: face symmetric, a/o3  Access: CVC (newly revised L AVF, defer to OP for use)    Labs:   All labs reviewed by me  Electrolytes/Renal -   Recent Labs   Lab Test 10/27/23  0506 10/26/23  2118 10/26/23  0457 10/25/23  0440 10/24/23  0537 10/23/23  0654 10/06/23  1530 10/06/23  1040 10/06/23  0905 10/06/23  0533 10/05/23  0251 10/05/23  0245 04/25/23  1113 04/24/23  0815 04/19/23  1433 04/18/23  0928   *  --  128* 127*   < > 121*   < >  --   --  134*   < > 136   < > 138   < > 136   POTASSIUM 5.4*  --  4.4 4.7   < > 5.5*   < >  --   --  4.6   < > 3.8   < > 4.9   < > 4.2   CHLORIDE 91*  --  95* 94*    < > 87*   < >  --   --  94*  --  90*   < > 98   < > 97*   CO2 23  --  24 20*   < > 19*   < >  --   --  23  --  24   < > 25   < > 25   BUN 67.8*  --  45.0* 68.5*   < > 87.9*   < >  --   --  60.5*  --  44.5*   < > 93.8*   < > 69.9*   CR 6.42*  --  4.93* 7.08*   < > 8.03*   < >  --   --  7.03*  --  5.77*   < > 7.83*   < > 5.62*   GLC 94 119* 100* 144*   < > 107*   < >  --    < > 152*   < > 127*   < > 97   < > 124*   KELLY 8.9  --  8.5* 8.6   < > 8.8   < >  --   --  8.9  --  9.4   < > 10.4*   < > 10.3*   MAG  --   --   --   --   --   --   --   --   --   --   --  2.1  --  2.4*  --  2.0   PHOS  --   --   --   --   --  3.1  --  6.6*  --  6.7*  --  5.2*   < > 8.0*   < > 5.5*    < > = values in this interval not displayed.       CBC -   Recent Labs   Lab Test 10/27/23  0506 10/26/23  0525 10/25/23  0440   WBC 4.6 5.2 4.2   HGB 8.4* 7.7* 7.7*   * 154 120*       LFTs -   Recent Labs   Lab Test 10/27/23  0506 10/26/23  0457 10/25/23  0440   ALKPHOS 178* 167* 170*   BILITOTAL 0.3 0.3 0.3   ALT <5 <5 <5   AST 14 16 18   PROTTOTAL 6.0* 5.9* 6.0*   ALBUMIN 2.9* 3.0* 3.1*       Iron Panel -   Recent Labs   Lab Test 10/24/23  0537 04/14/23  0725 04/06/23  0808 03/25/23  1058 03/12/23  0756   IRON 38* 60* 78  --  37*   IRONSAT 20 29  --   --  26   HOSSEIN 423* 286  --    < > 252    < > = values in this interval not displayed.         Imaging:  Reviewed    Current Medications:   apixaban ANTICOAGULANT  5 mg Oral BID    ceFEPIme  1 g Intravenous Q24H    gabapentin  300 mg Oral At Bedtime    Lacosamide  100 mg Oral QPM    lacosamide  50 mg Oral QAM    multivitamin RENAL  1 capsule Oral Daily    pantoprazole  40 mg Oral QAM AC    sevelamer carbonate  800 mg Oral TID w/meals    sodium chloride (PF)  9 mL Intracatheter During Dialysis/CRRT (from stock)    sodium chloride (PF)  9 mL Intracatheter During Dialysis/CRRT (from stock)    tacrolimus  0.5 mg Oral BID IS       JEANCARLOS Fowler

## 2023-10-27 NOTE — PROGRESS NOTES
Report given to dialysis, 20 mg of Midodrine given to patient as ordered and will give oxycodone too.

## 2023-10-27 NOTE — PLAN OF CARE
BP 90/52 (BP Location: Right arm, Patient Position: Semi-Salgado's)   Pulse 74   Temp 97.6  F (36.4  C) (Oral)   Resp 20   Wt 86.3 kg (190 lb 4.1 oz)   SpO2 97%   BMI 25.80 kg/m        Goal Outcome Evaluation:     Plan of care reviewed with: patient  Overall patient progress: no change    Pt noted to be comfortable on this shift, but did c/o left fistula pain PRN oxycodone, PRN dilaudid administer with schedule tylenol. PT BP borderline this morning BP 90/52, stating he is having chest pain, requested PRN midodrine, was administer. PT does have dialysis today. Plan to continue IV abx though 10/30 & para on Saturday. No acute events noted on this shift.

## 2023-10-27 NOTE — PROGRESS NOTES
HEMODIALYSIS TREATMENT NOTE    Date: 10/27/2023  Time: 2:24 PM    Data:   Weight change:  3 kg  Ultrafiltration - Post Run Net Total Removed (mL): 3000 mL  Vascular Access Status: patent  Dialyzer Rinse: Clear  Total Blood Volume Processed: 79.34 L Liters  Total Dialysis (Treatment) Time: 3.5 Hours    Lab:   No    Interventions:  CVC dressing changed  Midodrine 1 hr prior to tx  Midodrine at start of tx  Midodrine long-term through tx    Assessment:  Pt ran for 3.5 hrs on a K2/ Ca 2.5 bath with a /  and 3 L pulled with no complications. RCVC patent both lumens with good flow. Pt rinsed back, CVC NS locked, and caps changed. CVC dressing changed with no abnormal findings. Pt alert and oriented x4 with no complaints. Report given to PCN.     Plan:    Per renal team

## 2023-10-27 NOTE — PROGRESS NOTES
[facility]    Progress Note - Medicine Service, MANUEL TEAM 4       Date of Admission:  10/23/2023    Assessment & Plan   Blanco Osborne is a 58 year old male admitted on 10/23/2023. 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 2016 now with progressive cirrhosis (liver bx 12/2022) with evidence of portal hypertension and ascites, paroxysmal atrial fibrillation on Eliquis, DM type 2, history of CVA, hypertension, CHRISTIAN on BiPAP, chronic pericardial effusion, ESRD on dialysis who presented to [facility] ED on 10/5/23 with chest discomfort and palpitations, admitted with severe sepsis with septic shock requiring pressors secondary to multifactorial pneumonia, atrial fibrillation with aberrancy s/p 2 failed attempts at cardioversion, worsening pericardial effusion on ECHO s/p pericardiocentesis 10/11, and PE on apixaban. 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     Update today:  - Discussion with patient by hepatology team regarding not being candidate for renal transplant or liver/renal transplant  - Continue cefepime 1g q24h through 10/30  - SBP prophylaxis after cefepime completed  - Repeat paracentesis 10/28 to monitor for improvement in PMNs / TAGs     # SBP   # Chylous ascites   # Abdominal pain   Ascites likely related to portal HTN in the setting of cirrhosis, further c/b disruption of lymphatics (d/t increased lymph flow and portal HTN seen with cirrhosis) leading to chylous ascites as reflective of persistently elevated triglyceride. Has been on different antibiotics (see admission note).  Diagnostic and therapeutic paracentesis done on 10/24 with cell count of 1035, ANC of 634 which is improving from prior suggesting some improvement with antibiotics.   -  Hepatology consulted, appreciate recs              -consider lymphoscintigraphy (hold for now given improvement in triglycerides)              - Repeat paracentesis 10/28  - Transplant Infectious disease consulted, appreciate recs  - Continue Cefepime 1g Q24h through 10/30        # Cirrhosis secondary to NAFLD s/p liver transplant 2016 now with recurrent cirrhosis with evidence of portal hypertension and ascites   #Immunosuppression  Patient follows with Dr. Simms, though notably has not been seen in years; last clinic visit 2020 with plan for 6 month follow up. Next appt 2023. Evidence of recurrent cirrhosis on liver biopsy 2022, he did have alcohol use post transplant and continues to have metabolic syndrome that can also contribute. Continues to have splenomegaly on recent CT.  No evidence of HCC.  - Per hepatology note, not a candidate for KT or SLK  MELD 3.0: 32 at 10/27/2023  6:55 AM  MELD-Na: 31 at 10/27/2023  6:55 AM  Calculated from:  Serum Creatinine: On dialysis. Using the maximum value.  Serum Sodium: 126 mmol/L at 10/27/2023  5:06 AM  Total Bilirubin: 0.3 mg/dL (Using min of 1 mg/dL) at 10/27/2023  5:06 AM  Serum Albumin: 2.9 g/dL at 10/27/2023  5:06 AM  INR(ratio): 1.67 at 10/27/2023  6:55 AM  Age at listin years  Sex: Male at 10/27/2023  6:55 AM  -PTA tacrolimus 0.5mg BID (with goal of 3-5)  -Hepatology consulted appreciate recs      # Moderate protein-energy malnutrition  - Nutrition consulted,  appreciate recs  - Currently on 2gm NA and very low fat diet  - GI wanted to continue very low fat diet, as do not expect TG to change quickly   - Likely plan to continue very low fat (LCT) diet for 2-3 weeks then liberalize fat restriction for LCT challenge and repeat TG on next paracentesis to evaluate if chylous ascites persists.     #Left arm fistula sight  pain and swelling  Swelling and tenderness on the left arm fistula site which was initially concerning for fistula site infection  however imaging consistent with hematoma.  - 10/23 Blood Cultures NGTD  - Ice / Heat to affected area  - Lidocaine topical to affected area     # Concern for multifocal pneumonia, organism unspecified  - CT scan showed pulmonary infiltrates   - COVID 19/influenza/RSV negative  - Unable to provide sputum specimen  - Abx as detailed above     # Recurrent pericardial effusion  # Paroxysmal atrial fibrillation   # NSTEMI  Troponin did peak at 346 on 10/05 (thought to be due to demand because of shock and EKG did not had any ST elevation). ECHO 10/10 with recurrent pericardial effusion slightly larger than seen on ECHO 10/8 s/p pericardiocentesis 10/11 with 560cc removed, s/p RHC 10/11 which showed normal right and left sided filling pressures, mild elevated pulmonary pressures and portal hypertension with hepatic vein pressure gradient of 10mmHg. Drain removed on 10/12. Repeat ECHO 10/17 with no recurrent pericardial effusion, LVEF >70%.  - If patient requires repeat pericardiocentesis in future then send for AFB/fungal culture, 16 S and 28 S  - Cardiology had signed off at previous hospital  - Continue PTA eliquis 5mg BID      # Pulmonary embolism diagnosed 10/5/2023  - Continue PTA eliquis 5mg BID     #Previous embolic stroke in 11/2022     # ESRD on HD MWF  # S/p left AV fistula pseudoaneurysm repair, 8/2023  # LUE AV fistula infiltrated on 10/6  # On kidney transplant list  - Dialysis per nephrology  - Continue renal multivitamin  - Continue PTA sevelamer 800mg QID     # Severe sepsis with septic shock - due to multifactorial pneumonia -resolved  # Chronic hypotension, on midodrine  - Is normally on midodrine for chronic hypotension, was adjusted multiple times during this hospitalization.     - Off pressors since 10/7/2023  - Random cortisol level checked on 10/13, which was normal     # Pain at multiple locations  Has pain in left upper extremity due to infiltration of AV fistula, pain in right upper extremity due  to thrombophlebitis and frequent lab draws.  - Tylenol 650mg Q8h PRN (keep daily < 2g) for pain  - Oxycodone 5mg PO Q6 PRN for moderate pain  - Dilaudid 2mg PO Q6h PRN for severe pain      # Generalized weakness  - PT and OT ordered     # Probable CHRISTIAN  Per patient's wife, he had a sleep study about 10 years ago but does not have any CPAP or BiPAP at home.  - CPAP ordered for sleep  - Should undergo sleep study as outpatient after discharge     # Anemia of chronic disease  Hgb baseline between 8-9  - Hemoglobin is stable on monitoring     # Chronic Thrombocytopenia  Likely due to liver disease.   - Continue to monitor     # Restless leg syndrome  -Continue PTA ropinirole     # Seizure disorder  - Continue PTA Vimpat     # GERD  -Continue PTA PPI        Diet: Combination Diet Renal Diet (dialysis); 2 gm NA Diet; Very Low Fat Diet (up to 10g)  Snacks/Supplements Adult: Ensure Clear; With Meals  Snacks/Supplements Adult: Gelatein Plus; Between Meals  Room Service  Fluid restriction 1200 ML FLUID    DVT Prophylaxis: DOAC  Nixon Catheter: Not present  Fluids: PO  Lines: PRESENT      Hemodialysis Vascular Access Right Subclavian-Site Assessment: WDL  Hemodialysis Vascular Access Arteriovenous fistula Left Forearm-Site Assessment: WDL except;Tender;Painful      Cardiac Monitoring: None  Code Status: Full Code      Clinically Significant Risk Factors        # Hyperkalemia: Highest K = 5.4 mmol/L in last 2 days, will monitor as appropriate  # Hyponatremia: Lowest Na = 126 mmol/L in last 2 days, will monitor as appropriate      # Hypoalbuminemia: Lowest albumin = 2.8 g/dL at 10/22/2023  9:59 AM, will monitor as appropriate  # Coagulation Defect: INR = 1.67 (Ref range: 0.85 - 1.15) and/or PTT = 47 Seconds (Ref range: 22 - 38 Seconds), will monitor for bleeding    # Hypertension: Noted on problem list        # Overweight: Estimated body mass index is 25.8 kg/m  as calculated from the following:    Height as of 10/13/23: 1.829 m  (6').    Weight as of this encounter: 86.3 kg (190 lb 4.1 oz).   # Moderate Malnutrition: based on nutrition assessment    # Financial/Environmental Concerns: none         Disposition Plan      Expected Discharge Date: 10/30/2023      Destination: home;home with family  Discharge Comments: Dispo: Home with OP Dialysis  Plan: Sepsis - IV Abx; OP HD - [facility]  Progress: Plan to continue IV abx though 10/30 & para on Saturday        The patient's care was discussed with the Attending Physician, Dr. Aguirre .    Deejay Yoon MD  Medicine Service, [facility] TEAM [address] [place] [address]  Securely message with icanbuy (more info)  Text page via [facility] Paging/Directory   See signed in provider for up to date coverage information  ______________________________________________________________________    Interval History     Nursing notes reviewed. No acute events overnight. This morning seen in dialysis, reporting that he is processing information from GI team that he is not a liver or renal transplant candidate. Still reporting pain in his L arm at site of hematoma - discussed adding ice/heat/topical lidocaine to area. Discussed with patient and nephrology team at bedside - plan to have outpatient HD center decide timing of when to start using fistula again unless patient staying admitted longer than early next week.      Physical Exam   Vital Signs: Temp: 98.4  F (36.9  C) Temp src: Oral BP: 100/77 Pulse: 74   Resp: 20 SpO2: 99 % O2 Device: None (Room air)    Weight: 190 lbs 4.11 oz    General Appearance:  Appears comfortable, no acute distress  Respiratory: clear to auscultation bilaterally, breathing comfortably on room air  Cardiovascular: regular rate and rhythm, no MRG  GI: soft, mildly distended, non-tender to palpation  Skin: no visible rashes / lesions. Area of bruising and tenderness on L arm    Medical Decision Making       Please see A&P for additional details of medical  decision making.      Data     I have personally reviewed the following data over the past 24 hrs:    4.6  \   8.4 (L)   / 131 (L)     126 (L) 91 (L) 67.8 (H) /  94   5.4 (H) 23 6.42 (H) \     ALT: <5 AST: 14 AP: 178 (H) TBILI: 0.3   ALB: 2.9 (L) TOT PROTEIN: 6.0 (L) LIPASE: N/A     INR:  1.67 (H) PTT:  N/A   D-dimer:  N/A Fibrinogen:  N/A       Imaging results reviewed over the past 24 hrs:   No results found for this or any previous visit (from the past 24 hour(s)).

## 2023-10-27 NOTE — PROGRESS NOTES
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      RECOMMENDATIONS:  # DDLT 9/20/16 for MASH  # Immunosuppression  # Recurrent cirrhosis  -Solitary alk phos stable over the past year.    - continue tacrolimus 0.5 mg BID, trough level 10/24 at 3.7. goal trough level 3-5. He remains on HD.   - evidence of recurrent cirrhosis on liver biopsy 12/2022, he did have alcohol use post transplant and continues to have metabolic syndrome that may also be contributing. Poor sample to differentiate cause, would not recommend repeating.  Peth has been negative, last +ETG 2017.  Has not had EGD posttransplant, this will be scheduled as OP.  Continues to have splenomegaly on recent CT.  No evidence of HCC.  - Discussed with him and his wife that he is not a candidate for solitary KT or SLK due to non adherence to follow up, alcohol use post transplant, need for multiorgan transplant in a re transplant patient, metabolic syndrome. He is concerned about his quality of life and we did discuss a palliative approach (stopping HD) which he feels does not align with his wishes. Discussed with CKD on HD and cirrhosis, not a candidate for diuretics, TIPS.      # Ascites  # SBP  # chylous ascites  - repeat paracentesis with improvement in PMN's, however, did not show clearance despite being on abx for past several weeks (cefepime 10/17-, zosyn 10/5-10/12).  Triglycerides decreased from prior.   - followed by tx ID with plans to continue cefepime  for now to complete 2 week course.   - triglycerides decreased from prior.   - plan to have repeat para to evaluate for infection and send cell count, triglycerides, flow cytometry, cytology. Most likely related to cirrhosis.      # Hyponatremia  # CKD on HD  -  followed by nephrology.   -Improvement in sodium following HD     Patient was discussed with attending physician, Dr. Jigna Reid.  The above note reflects our joint plan.     Next follow up appointment: Dr. Simms 12/29/2023    Thank you for the opportunity to be involved with  Blanco CENTENO Dylon care. Please call with any questions or concerns.    Jacklyn Liu, APRN, CNP  Inpatient Hepatology [facility]        SUBJECTIVE:  Has been feeling well, denies change in mentation, nausea, vomiting. Having an increase in abdominal distention. No dizziness or syncope.     ROS:  A 10-point review of systems was negative.    OBJECTIVE:  VS: BP 97/66   Pulse 79   Temp 98.4  F (36.9  C) (Oral)   Resp 20   Wt 86.3 kg (190 lb 4.1 oz)   SpO2 99%   BMI 25.80 kg/m        General: In no acute distress, mild facial muscle wasting  Neuro: AOx3, No asterixis  HEENT:  Noscleral icterus, Nooral lesions  CV:  Skin warm and dry  Lungs:  Respirations even and nonlabored on room air  Abd:  Mildly distended, nontender   Extrem: Noperipheral edema   Skin: Nojaundice  Psych: pleasant    MEDICATIONS:  Current Facility-Administered Medications   Medication    acetaminophen (TYLENOL) tablet 650 mg    alteplase (CATHFLO ACTIVASE) injection 2 mg    alteplase (CATHFLO ACTIVASE) injection 2 mg    apixaban ANTICOAGULANT (ELIQUIS) tablet 5 mg    camphor-menthol (DERMASARRA) lotion    ceFEPIme (MAXIPIME) 1 g vial to attach to  mL bag for ADULTS or NS 50 mL bag for PEDS    gabapentin (NEURONTIN) capsule 300 mg    HYDROmorphone (DILAUDID) tablet 2 mg    hydrOXYzine (ATARAX) tablet 25 mg    lacosamide (VIMPAT) tablet 100 mg    lacosamide (VIMPAT) tablet 50 mg    melatonin tablet 3 mg    midodrine  (PROAMATINE) tablet 10-20 mg    midodrine (PROAMATINE) tablet 10-20 mg    multivitamin RENAL (TRIPHROCAPS) capsule 1 capsule    naloxone (NARCAN) injection 0.2 mg    Or    naloxone (NARCAN) injection 0.4 mg    Or    naloxone (NARCAN) injection 0.2 mg    Or    naloxone (NARCAN) injection 0.4 mg    oxyCODONE (ROXICODONE) tablet 5 mg    pantoprazole (PROTONIX) EC tablet 40 mg    polyethylene glycol (MIRALAX) Packet 17 g    rOPINIRole (REQUIP) tablet 0.5 mg    senna-docusate (SENOKOT-S/PERICOLACE) 8.6-50 MG per tablet 1 tablet    Or    senna-docusate (SENOKOT-S/PERICOLACE) 8.6-50 MG per tablet 2 tablet    sevelamer carbonate (RENVELA) tablet 800 mg    sodium chloride (PF) 0.9% PF flush 10 mL    sodium chloride (PF) 0.9% PF flush 10 mL    sodium chloride (PF) 0.9% PF flush 9 mL    sodium chloride (PF) 0.9% PF flush 9 mL    sodium chloride 0.9% BOLUS 100-150 mL    tacrolimus (GENERIC) capsule 0.5 mg       REVIEW OF LABORATORY, PATHOLOGY AND IMAGING RESULTS:  BMP  Recent Labs   Lab 10/27/23  0506 10/26/23  2118 10/26/23  0457 10/25/23  0440 10/24/23  0537   *  --  128* 127* 127*   POTASSIUM 5.4*  --  4.4 4.7 4.7   CHLORIDE 91*  --  95* 94* 94*   KELLY 8.9  --  8.5* 8.6 8.5*   CO2 23  --  24 20* 21*   BUN 67.8*  --  45.0* 68.5* 47.6*   CR 6.42*  --  4.93* 7.08* 5.41*   GLC 94 119* 100* 144* 96     CBC  Recent Labs   Lab 10/27/23  0506 10/26/23  0525 10/25/23  0440 10/24/23  0537   WBC 4.6 5.2 4.2 5.2   RBC 2.65* 2.44* 2.47* 2.76*   HGB 8.4* 7.7* 7.7* 8.6*   HCT 26.8* 24.6* 24.4* 27.8*   * 101* 99 101*   MCH 31.7 31.6 31.2 31.2   MCHC 31.3* 31.3* 31.6 30.9*   RDW 16.8* 17.2* 17.0* 17.6*   * 154 120* 151     INR  Recent Labs   Lab 10/27/23  0655 10/26/23  0525   INR 1.67* 1.70*     LFTs  Recent Labs   Lab 10/27/23  0506 10/26/23  0457 10/25/23  0440 10/24/23  0537   ALKPHOS 178* 167* 170* 189*   AST 14 16 18 21   ALT <5 <5 <5 <5   BILITOTAL 0.3 0.3 0.3 0.4   PROTTOTAL 6.0* 5.9* 6.0* 6.0*   ALBUMIN 2.9* 3.0*  "3.1* 3.1*        MELD 3.0: 32 at 10/27/2023  6:55 AM  MELD-Na: 31 at 10/27/2023  6:55 AM  Calculated from:  Serum Creatinine: On dialysis. Using the maximum value.  Serum Sodium: 126 mmol/L at 10/27/2023  5:06 AM  Total Bilirubin: 0.3 mg/dL (Using min of 1 mg/dL) at 10/27/2023  5:06 AM  Serum Albumin: 2.9 g/dL at 10/27/2023  5:06 AM  INR(ratio): 1.67 at 10/27/2023  6:55 AM  Age at listin years  Sex: Male at 10/27/2023  6:55 AM    Previous work-up:   Lab Results   Component Value Date    HEPBANG Nonreactive 2023    HBCAB Nonreactive 2023    AUSAB 0.12 2023    HCVAB  2016     Nonreactive   Assay performance characteristics have not been established for newborns,   infants, and children      HCVRNA Not Detected 2022    HOSSEIN 423 (H) 10/24/2023    IRONSAT 20 10/24/2023    TSH 4.89 (H) 2023    CHOL 87 10/13/2023    HDL 42 10/13/2023    LDL 32 10/13/2023    TRIG 65 10/13/2023    A1C 4.7 08/10/2023    POPETH <10 10/20/2023    PLPETH <10 10/20/2023      No results found for: \"SPECDES\", \"LDRESULTS\"      Imaging Results:  None current    "

## 2023-10-28 LAB
ABSOLUTE NEUTROPHILS, BODY FLUID: 203.7 /UL
ALBUMIN BODY FLUID SOURCE: NORMAL
ALBUMIN FLD-MCNC: 1.4 G/DL
ALBUMIN SERPL BCG-MCNC: 3.2 G/DL (ref 3.5–5.2)
ALP SERPL-CCNC: 208 U/L (ref 40–129)
ALT SERPL W P-5'-P-CCNC: 6 U/L (ref 0–70)
ANION GAP SERPL CALCULATED.3IONS-SCNC: 11 MMOL/L (ref 7–15)
APPEARANCE FLD: ABNORMAL
AST SERPL W P-5'-P-CCNC: 16 U/L (ref 0–45)
BACTERIA BLD CULT: NO GROWTH
BACTERIA BLD CULT: NO GROWTH
BILIRUB SERPL-MCNC: 0.4 MG/DL
BUN SERPL-MCNC: 43.8 MG/DL (ref 8–23)
CALCIUM SERPL-MCNC: 9 MG/DL (ref 8.6–10)
CELL COUNT BODY FLUID SOURCE: ABNORMAL
CHLORIDE SERPL-SCNC: 94 MMOL/L (ref 98–107)
COLOR FLD: ABNORMAL
CREAT SERPL-MCNC: 4.81 MG/DL (ref 0.67–1.17)
DEPRECATED HCO3 PLAS-SCNC: 25 MMOL/L (ref 22–29)
EGFRCR SERPLBLD CKD-EPI 2021: 13 ML/MIN/1.73M2
ERYTHROCYTE [DISTWIDTH] IN BLOOD BY AUTOMATED COUNT: 17.3 % (ref 10–15)
GLUCOSE SERPL-MCNC: 93 MG/DL (ref 70–99)
HCT VFR BLD AUTO: 29 % (ref 40–53)
HGB BLD-MCNC: 9.1 G/DL (ref 13.3–17.7)
LYMPHOCYTES NFR FLD MANUAL: 34 %
MCH RBC QN AUTO: 31.6 PG (ref 26.5–33)
MCHC RBC AUTO-ENTMCNC: 31.4 G/DL (ref 31.5–36.5)
MCV RBC AUTO: 101 FL (ref 78–100)
MONOS+MACROS NFR FLD MANUAL: NORMAL %
NEUTS BAND NFR FLD MANUAL: 28 %
OTHER CELLS FLD MANUAL: 38 %
PLATELET # BLD AUTO: 168 10E3/UL (ref 150–450)
POTASSIUM SERPL-SCNC: 4.7 MMOL/L (ref 3.4–5.3)
PROT FLD-MCNC: 2.5 G/DL
PROT SERPL-MCNC: 6.6 G/DL (ref 6.4–8.3)
PROTEIN BODY FLUID SOURCE: NORMAL
RBC # BLD AUTO: 2.88 10E6/UL (ref 4.4–5.9)
SODIUM SERPL-SCNC: 130 MMOL/L (ref 135–145)
TRIGL FLD-MCNC: 152 MG/DL
TRIGLYCERIDE BODY FLUID SOURCE: NORMAL
WBC # BLD AUTO: 5.4 10E3/UL (ref 4–11)
WBC # FLD AUTO: 737 /UL

## 2023-10-28 PROCEDURE — 80053 COMPREHEN METABOLIC PANEL: CPT | Performed by: STUDENT IN AN ORGANIZED HEALTH CARE EDUCATION/TRAINING PROGRAM

## 2023-10-28 PROCEDURE — 250N000013 HC RX MED GY IP 250 OP 250 PS 637

## 2023-10-28 PROCEDURE — 85027 COMPLETE CBC AUTOMATED: CPT | Performed by: STUDENT IN AN ORGANIZED HEALTH CARE EDUCATION/TRAINING PROGRAM

## 2023-10-28 PROCEDURE — 88112 CYTOPATH CELL ENHANCE TECH: CPT | Mod: 26 | Performed by: PATHOLOGY

## 2023-10-28 PROCEDURE — 88185 FLOWCYTOMETRY/TC ADD-ON: CPT

## 2023-10-28 PROCEDURE — 84478 ASSAY OF TRIGLYCERIDES: CPT

## 2023-10-28 PROCEDURE — 88184 FLOWCYTOMETRY/ TC 1 MARKER: CPT

## 2023-10-28 PROCEDURE — 89051 BODY FLUID CELL COUNT: CPT

## 2023-10-28 PROCEDURE — 84157 ASSAY OF PROTEIN OTHER: CPT

## 2023-10-28 PROCEDURE — 250N000011 HC RX IP 250 OP 636

## 2023-10-28 PROCEDURE — 250N000013 HC RX MED GY IP 250 OP 250 PS 637: Performed by: HOSPITALIST

## 2023-10-28 PROCEDURE — 250N000013 HC RX MED GY IP 250 OP 250 PS 637: Performed by: STUDENT IN AN ORGANIZED HEALTH CARE EDUCATION/TRAINING PROGRAM

## 2023-10-28 PROCEDURE — 87205 SMEAR GRAM STAIN: CPT

## 2023-10-28 PROCEDURE — 88189 FLOWCYTOMETRY/READ 16 & >: CPT | Performed by: STUDENT IN AN ORGANIZED HEALTH CARE EDUCATION/TRAINING PROGRAM

## 2023-10-28 PROCEDURE — 88184 FLOWCYTOMETRY/ TC 1 MARKER: CPT | Performed by: STUDENT IN AN ORGANIZED HEALTH CARE EDUCATION/TRAINING PROGRAM

## 2023-10-28 PROCEDURE — 99233 SBSQ HOSP IP/OBS HIGH 50: CPT | Mod: GC | Performed by: STUDENT IN AN ORGANIZED HEALTH CARE EDUCATION/TRAINING PROGRAM

## 2023-10-28 PROCEDURE — 88305 TISSUE EXAM BY PATHOLOGIST: CPT | Mod: 26 | Performed by: PATHOLOGY

## 2023-10-28 PROCEDURE — 82042 OTHER SOURCE ALBUMIN QUAN EA: CPT

## 2023-10-28 PROCEDURE — 88305 TISSUE EXAM BY PATHOLOGIST: CPT | Mod: TC

## 2023-10-28 PROCEDURE — 120N000002 HC R&B MED SURG/OB UMMC

## 2023-10-28 PROCEDURE — 36415 COLL VENOUS BLD VENIPUNCTURE: CPT | Performed by: STUDENT IN AN ORGANIZED HEALTH CARE EDUCATION/TRAINING PROGRAM

## 2023-10-28 PROCEDURE — 250N000012 HC RX MED GY IP 250 OP 636 PS 637: Performed by: HOSPITALIST

## 2023-10-28 RX ADMIN — PANTOPRAZOLE SODIUM 40 MG: 40 TABLET, DELAYED RELEASE ORAL at 08:29

## 2023-10-28 RX ADMIN — Medication 1 CAPSULE: at 08:29

## 2023-10-28 RX ADMIN — OXYCODONE HYDROCHLORIDE 5 MG: 5 TABLET ORAL at 14:19

## 2023-10-28 RX ADMIN — GABAPENTIN 300 MG: 300 CAPSULE ORAL at 21:06

## 2023-10-28 RX ADMIN — OXYCODONE HYDROCHLORIDE 5 MG: 5 TABLET ORAL at 00:48

## 2023-10-28 RX ADMIN — HYDROXYZINE HYDROCHLORIDE 25 MG: 25 TABLET ORAL at 21:11

## 2023-10-28 RX ADMIN — SEVELAMER CARBONATE 800 MG: 800 TABLET, FILM COATED ORAL at 08:29

## 2023-10-28 RX ADMIN — CEFEPIME HYDROCHLORIDE 1 G: 1 INJECTION, POWDER, FOR SOLUTION INTRAMUSCULAR; INTRAVENOUS at 18:46

## 2023-10-28 RX ADMIN — APIXABAN 5 MG: 5 TABLET, FILM COATED ORAL at 08:29

## 2023-10-28 RX ADMIN — LACOSAMIDE 100 MG: 50 TABLET, FILM COATED ORAL at 19:15

## 2023-10-28 RX ADMIN — ACETAMINOPHEN 650 MG: 325 TABLET, FILM COATED ORAL at 10:56

## 2023-10-28 RX ADMIN — LACOSAMIDE 50 MG: 50 TABLET, FILM COATED ORAL at 08:29

## 2023-10-28 RX ADMIN — APIXABAN 5 MG: 5 TABLET, FILM COATED ORAL at 19:15

## 2023-10-28 RX ADMIN — SEVELAMER CARBONATE 800 MG: 800 TABLET, FILM COATED ORAL at 16:45

## 2023-10-28 RX ADMIN — MIDODRINE HYDROCHLORIDE 20 MG: 5 TABLET ORAL at 14:44

## 2023-10-28 RX ADMIN — MIDODRINE HYDROCHLORIDE 10 MG: 5 TABLET ORAL at 16:44

## 2023-10-28 RX ADMIN — OXYCODONE HYDROCHLORIDE 5 MG: 5 TABLET ORAL at 21:06

## 2023-10-28 RX ADMIN — TACROLIMUS 0.5 MG: 0.5 CAPSULE ORAL at 08:29

## 2023-10-28 RX ADMIN — OXYCODONE HYDROCHLORIDE 5 MG: 5 TABLET ORAL at 08:29

## 2023-10-28 RX ADMIN — TACROLIMUS 0.5 MG: 0.5 CAPSULE ORAL at 18:46

## 2023-10-28 ASSESSMENT — ACTIVITIES OF DAILY LIVING (ADL)
ADLS_ACUITY_SCORE: 22
ADLS_ACUITY_SCORE: 20
ADLS_ACUITY_SCORE: 22

## 2023-10-28 NOTE — PROGRESS NOTES
/61 (BP Location: Right arm)   Pulse 74   Temp 98.3  F (36.8  C) (Oral)   Resp 15   Wt 86.3 kg (190 lb 4.1 oz)   SpO2 97%   BMI 25.80 kg/m     Activity: independent   Neuros:  A&O x4.   Cardiac:  XWDL,  was hypotensive BP 90/61 at the beginning of this shift, but BP improved /58 @0440 and /61 @ 0454  Respiratory:  WDL on RA. Denies SOB.  GI/:  Voiding spontaneously. +BS, passing flatus.   Diet: Regular   Skin: dry and itchy   Lines: R PIV-SL, R-CVC, L-HD not in use.  Incisions/Drains: None  Labs: Reviewed   Pain/nausea: c/o left arm that has fistula pain 7/10-that is being managed with Oxy at 0048. Denies N/V  New changes this shift: Refused CPAP at HS. Charge nurse notified team.   Plan:  Continue POC

## 2023-10-28 NOTE — PROCEDURES
[facility]  CAPS PROCEDURE NOTE  Date of Admission:  10/23/2023  Consult Requested by: Medicine  Reason for Consult: diagnostic evaluation of ascites and management of symptomatic ascites    Indication/HPI:     59 yo M with decomp cirrhosis and ascites, CKD    Pre-Procedure Diagnosis: Ascites    Post-Procedure Diagnosis: Ascites    Risk Assessment: Average risk, Technically straightforward; patient's anticoagulation has been held according to guidelines based on the agent and platelets and coags are within guidelines    Procedure Outcome:  Diagnostic paracentesis performed and Therapeutic paracentesis performed with 2 liters of ascites removed.   See additional procedure details below.    The primary covering service should follow up and address any lab results as appropriate.    Stephen Burr MD  [facility]  Securely message with Vocera (more info)  Text page via [employer] Paging/Directory   See signed in provider for up to date coverage information      [facility]    Paracentesis    Date/Time: 10/28/2023 11:49 AM    Performed by: Stephen Burr MD  Authorized by: Stephen Burr MD    PRE-PROCEDURE DETAILS  Initial or subsequent exam: subsequent  Procedure purpose: therapeutic and diagnostic  Indications: abdominal discomfort secondary to ascites and suspected peritonitis        UNIVERSAL PROTOCOL   Site Marked: Yes  Prior Images Obtained and Reviewed:  Yes  Required items: Required blood products, implants, devices and special equipment available    Patient identity confirmed:  Verbally with patient, arm band and provided demographic data  NA - No sedation, light sedation, or local anesthesia  Confirmation Checklist:  Relevant allergies, patient's identity using two indicators, correct equipment/implants were available and procedure was appropriate and matched the consent or  emergent situation  Time out: Immediately prior to the procedure a time out was called    Universal Protocol: the Joint Commission Universal Protocol was followed    Preparation: Patient was prepped and draped in usual sterile fashion    ESBL (mL):  0     ANESTHESIA    Anesthesia:  Local infiltration  Local Anesthetic:  Lidocaine 1% without epinephrine  Anesthetic Total (mL):  6      SEDATION    Patient Sedated: No    POST PROCEDURE DETAILS  Needle gauge: 5 Fr Blessingeh.  Ultrasound guidance: yes  Fluid characteristics: yellow.  Dressing: bandage.        PROCEDURE  Describe Procedure: Consult and Procedure Service Note    The risks and benefits of the procedure were explained to patient  who expressed understanding and opted to proceed. Confirmed consent was obtained and placed in the chart.  A time out was performed. An area of ascites was located with ultrasound and marked in the LLQ; the area was prepped and draped in the usual sterile fashion. A pocklet of ascites was located using ultrasound and 1% lidocaine was instilled a 25 g needle and using an 11 blade scapel a small skin incision was made.   Subsequently a 5 Fr Yeuh catheter guided to fluid pocket under real time guidance; aspiration yeilded 20 ml yellow fluid obtained on the 1st attempt.   Fluid stopped draining and drained slowly, after multiple attempts to optimize, d/w pt possible intestinal blockage and re-attempt at new site. Site marked after vascular eval and adeq pocket  seen, 1% lidocaine was instilled a 25 g needle and using an 11 blade scapel a small skin incision was made.   Subsequently a 5 Fr straight catheter guided to fluid pocket under real time guidance; aspiration yeilded 20 ml yellow fluid obtained on the 3rd attempt.   The specimen(s) left secured in lab bag at bedside, to be sent for analysis; RN updated, and the area dressed per CAPS routine                     Patient Tolerance:  Patient tolerated the procedure well with no immediate  complications  Length of time physician/provider present for 1:1 monitoring during sedation: 0   Thank you for consulting the CAPS team. Please contact the Consult and Procedure Service if any concerns or complications arise.         POC US GUIDE FOR PARACENTESIS     Impression  US Indication: decompensated liver disease and abdominal distension    FINDINGS:  Limited abdominal ultrasound was performed and demonstrated an adequate fluid collection on the LLQ of the abdomen. Doppler of the skin demonstrated an area at this site without significant vasculature. A paracentesis at this site was subsequently performed.

## 2023-10-28 NOTE — PROGRESS NOTES
[facility]    Progress Note - Medicine Service, [facility] TEAM 4       Date of Admission:  10/23/2023    Assessment & Plan   Blanco Osobrne is a 58 year old male admitted on 10/23/2023. 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 2016 now with progressive cirrhosis (liver bx 12/2022) with evidence of portal hypertension and ascites, paroxysmal atrial fibrillation on Eliquis, DM type 2, history of CVA, hypertension, CHRISTIAN on BiPAP, chronic pericardial effusion, ESRD on dialysis who presented to [facility] ED on 10/5/23 with chest discomfort and palpitations, admitted with severe sepsis with septic shock requiring pressors secondary to multifactorial pneumonia, atrial fibrillation with aberrancy s/p 2 failed attempts at cardioversion, worsening pericardial effusion on ECHO s/p pericardiocentesis 10/11, and PE on apixaban. 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     Update today:  - Paracentesis with CAPS to evaluate interval PMN count and TAG  - Continue cefepime 1g q24h through 10/30  - SBP prophylaxis after cefepime completed     # SBP   # Chylous ascites   # Abdominal pain   Ascites likely related to portal HTN in the setting of cirrhosis, further c/b disruption of lymphatics (d/t increased lymph flow and portal HTN seen with cirrhosis) leading to chylous ascites as reflective of persistently elevated triglyceride. Has been on different antibiotics (see admission note).  Diagnostic and therapeutic paracentesis repeated multiple times during hospitalization with down-trending PMN count suggesting some improvement with antibiotics.   - Hepatology consulted, appreciate recs              -consider lymphoscintigraphy (hold for now given improvement in triglycerides)               - Repeat paracentesis 10/28  - Transplant Infectious disease consulted, appreciate recs  - Continue Cefepime 1g Q24h through 10/30        # Cirrhosis secondary to NAFLD s/p liver transplant 2016 now with recurrent cirrhosis with evidence of portal hypertension and ascites   #Immunosuppression  Patient follows with Dr. Simms, though notably has not been seen in years; last clinic visit 2020 with plan for 6 month follow up. Next appt 2023. Evidence of recurrent cirrhosis on liver biopsy 2022, he did have alcohol use post transplant and continues to have metabolic syndrome that can also contribute. Continues to have splenomegaly on recent CT.  No evidence of HCC.  - Per hepatology note, not a candidate for KT or SLK  MELD 3.0: 29 at 10/28/2023  7:26 AM  MELD-Na: 29 at 10/28/2023  7:26 AM  Calculated from:  Serum Creatinine: On dialysis. Using the maximum value.  Serum Sodium: 130 mmol/L at 10/28/2023  7:26 AM  Total Bilirubin: 0.4 mg/dL (Using min of 1 mg/dL) at 10/28/2023  7:26 AM  Serum Albumin: 3.2 g/dL at 10/28/2023  7:26 AM  INR(ratio): 1.67 at 10/27/2023  6:55 AM  Age at listin years  Sex: Male at 10/28/2023  7:26 AM  -PTA tacrolimus 0.5mg BID (with goal of 3-5)  -Hepatology consulted appreciate recs      # Moderate protein-energy malnutrition  - Nutrition consulted,  appreciate recs  - 2gm NA and very low fat diet  - GI wanted to continue very low fat diet, as do not expect TG to change quickly   - Likely plan to continue very low fat (LCT) diet for 2-3 weeks then liberalize fat restriction for LCT challenge and repeat TG on next paracentesis to evaluate if chylous ascites persists.     #Left arm fistula sight  pain and swelling  Swelling and tenderness on the left arm fistula site which was initially concerning for fistula site infection however imaging consistent with hematoma.  - 10/23 Blood Cultures NGTD  - Ice / Heat to affected area  - Lidocaine topical to affected area     # Concern  for multifocal pneumonia, organism unspecified  - CT scan showed pulmonary infiltrates   - COVID 19/influenza/RSV negative  - Unable to provide sputum specimen  - Abx as detailed above     # Recurrent pericardial effusion  # Paroxysmal atrial fibrillation   # NSTEMI  Troponin did peak at 346 on 10/05 (thought to be due to demand because of shock and EKG did not had any ST elevation). ECHO 10/10 with recurrent pericardial effusion slightly larger than seen on ECHO 10/8 s/p pericardiocentesis 10/11 with 560cc removed, s/p RHC 10/11 which showed normal right and left sided filling pressures, mild elevated pulmonary pressures and portal hypertension with hepatic vein pressure gradient of 10mmHg. Drain removed on 10/12. Repeat ECHO 10/17 with no recurrent pericardial effusion, LVEF >70%.  - If patient requires repeat pericardiocentesis in future then send for AFB/fungal culture, 16 S and 28 S  - Cardiology had signed off at previous hospital  - Continue PTA eliquis 5mg BID      # Pulmonary embolism diagnosed 10/5/2023  - Continue PTA eliquis 5mg BID     #Previous embolic stroke in 11/2022     # ESRD on HD MWF  # S/p left AV fistula pseudoaneurysm repair, 8/2023  # LUE AV fistula infiltrated on 10/6  # On kidney transplant list  - Dialysis per nephrology  - Continue renal multivitamin  - Continue PTA sevelamer 800mg QID     # Severe sepsis with septic shock - due to multifactorial pneumonia -resolved  # Chronic hypotension, on midodrine  - Is normally on midodrine for chronic hypotension, was adjusted multiple times during this hospitalization.     - Off pressors since 10/7/2023  - Random cortisol level checked on 10/13, which was normal     # Pain at multiple locations  Has pain in left upper extremity due to infiltration of AV fistula, pain in right upper extremity due to thrombophlebitis and frequent lab draws.  - Tylenol 650mg Q8h PRN (keep daily < 2g) for pain  - Oxycodone 5mg PO Q6 PRN for moderate pain     #  Probable CHRISTIAN  Per patient's wife, he had a sleep study about 10 years ago but does not have any CPAP or BiPAP at home.  - CPAP ordered for sleep  - Should undergo sleep study as outpatient after discharge     # Anemia of chronic disease  Hgb baseline between 8-9  - Hemoglobin is stable on monitoring     # Chronic Thrombocytopenia  Likely due to liver disease.   - Continue to monitor     # Restless leg syndrome  -Continue PTA ropinirole     # Seizure disorder  - Continue PTA Vimpat     # GERD  -Continue PTA PPI        Diet: Combination Diet Renal Diet (dialysis); 2 gm NA Diet; Very Low Fat Diet (up to 10g)  Snacks/Supplements Adult: Ensure Clear; With Meals  Snacks/Supplements Adult: Gelatein Plus; Between Meals  Room Service  Fluid restriction 1200 ML FLUID    DVT Prophylaxis: DOAC  Nixon Catheter: Not present  Fluids: PO  Lines: PRESENT      Hemodialysis Vascular Access Right Subclavian-Site Assessment: WDL  Hemodialysis Vascular Access Arteriovenous fistula Left Forearm-Site Assessment: WDL except;Painful      Cardiac Monitoring: None  Code Status: Full Code      Clinically Significant Risk Factors        # Hyperkalemia: Highest K = 5.4 mmol/L in last 2 days, will monitor as appropriate  # Hyponatremia: Lowest Na = 126 mmol/L in last 2 days, will monitor as appropriate      # Hypoalbuminemia: Lowest albumin = 2.8 g/dL at 10/22/2023  9:59 AM, will monitor as appropriate  # Coagulation Defect: INR = 1.67 (Ref range: 0.85 - 1.15) and/or PTT = 47 Seconds (Ref range: 22 - 38 Seconds), will monitor for bleeding    # Hypertension: Noted on problem list        # Overweight: Estimated body mass index is 26.09 kg/m  as calculated from the following:    Height as of 10/13/23: 1.829 m (6').    Weight as of this encounter: 87.3 kg (192 lb 6.4 oz).   # Moderate Malnutrition: based on nutrition assessment    # Financial/Environmental Concerns: none         Disposition Plan     Expected Discharge Date: 10/30/2023       Destination: home;home with family  Discharge Comments: Dispo: Home with OP Dialysis  Plan: Sepsis - IV Abx; OP HD - [facility]  Progress: Plan to continue IV abx though 10/30 & para on Saturday        The patient's care was discussed with the Attending Physician, Dr. Aguirre .    Deejay Yoon MD  [facility] [address] [place] [address]  Securely message with [employer] (more info)  Text page via [employer] Paging/Directory   See signed in provider for up to date coverage information  ______________________________________________________________________    Interval History     Nursing notes reviewed. No acute events overnight. This morning reports no concerning symptoms. Discussed care plans including continued antibiotics and follow-up paracentesis labs.      Physical Exam   Vital Signs: Temp: 98.3  F (36.8  C) Temp src: Oral BP: 104/61 Pulse: 74   Resp: 15 SpO2: 97 % O2 Device: None (Room air)    Weight: 192 lbs 6.4 oz    General Appearance:  Appears comfortable, no acute distress  Respiratory: clear to auscultation bilaterally, breathing comfortably on room air  Cardiovascular: regular rate and rhythm, no MRG  GI: soft, non-distended, non-tender to palpation  Skin: no visible rashes / lesions. Area of bruising and tenderness on L arm    Medical Decision Making       Please see A&P for additional details of medical decision making.      Data     I have personally reviewed the following data over the past 24 hrs:    5.4  \   9.1 (L)   / 168     130 (L) 94 (L) 43.8 (H) /  93   4.7 25 4.81 (H) \     ALT: 6 AST: 16 AP: 208 (H) TBILI: 0.4   ALB: 3.2 (L) TOT PROTEIN: 6.6 LIPASE: N/A       Imaging results reviewed over the past 24 hrs:   Recent Results (from the past 24 hour(s))   POC US GUIDE FOR PARACENTESIS    Impression    US Indication: decompensated liver disease and abdominal distension    FINDINGS:  Limited abdominal ultrasound was performed and demonstrated an adequate  fluid collection on the LLQ of the abdomen. Doppler of the skin demonstrated an area at this site without significant vasculature. A paracentesis at this site was subsequently performed.

## 2023-10-28 NOTE — PLAN OF CARE
RN [id number]:    Vital signs: BP (!) 86/45   Pulse 77   Temp 97.7  F (36.5  C) (Oral)   Resp 16   Wt 87.3 kg (192 lb 6.4 oz)   SpO2 96%   BMI 26.09 kg/m      Status: 10/23 admit for septic shock  Neuro: AOX4  Pain/Nausea: pain managed with PRN Oxycdo  Cardiac: hypotensive, midodrine given x 1  Respiratory: WNL  Mobility: independent  Diet: regular  Labs: reviewed  LDAs: PIV, CVC, L HD  Skin/incisions: no new deficits found  GI/: continent B/B  Plan: continue with plan of care

## 2023-10-28 NOTE — PLAN OF CARE
Goal Outcome Evaluation: Pt alert and oriented x4. C/o pain in left arm and chest (not new or change in pain). PRN oxycodone and tylenol administered with minimal effect. Warm packs applied to left arm. Pt declined cold pack. Hypotensive later in evening. PRN midodrine administered. Eating fair. Needs reinforcement of fluid restriction. Ambulated in halls independently. Plan for paracentesis tomorrow. Refusing cpap. Monitor BP.

## 2023-10-29 LAB
ALBUMIN SERPL BCG-MCNC: 3.1 G/DL (ref 3.5–5.2)
ALP SERPL-CCNC: 228 U/L (ref 40–129)
ALT SERPL W P-5'-P-CCNC: <5 U/L (ref 0–70)
ANION GAP SERPL CALCULATED.3IONS-SCNC: 14 MMOL/L (ref 7–15)
AST SERPL W P-5'-P-CCNC: 18 U/L (ref 0–45)
BACTERIA FLD CULT: NO GROWTH
BACTERIA FLD CULT: NO GROWTH
BILIRUB SERPL-MCNC: 0.4 MG/DL
BUN SERPL-MCNC: 64.9 MG/DL (ref 8–23)
CALCIUM SERPL-MCNC: 9.3 MG/DL (ref 8.6–10)
CHLORIDE SERPL-SCNC: 91 MMOL/L (ref 98–107)
CREAT SERPL-MCNC: 6.68 MG/DL (ref 0.67–1.17)
DEPRECATED HCO3 PLAS-SCNC: 22 MMOL/L (ref 22–29)
EGFRCR SERPLBLD CKD-EPI 2021: 9 ML/MIN/1.73M2
ERYTHROCYTE [DISTWIDTH] IN BLOOD BY AUTOMATED COUNT: 17.2 % (ref 10–15)
GLUCOSE SERPL-MCNC: 114 MG/DL (ref 70–99)
HCT VFR BLD AUTO: 26.4 % (ref 40–53)
HGB BLD-MCNC: 8.4 G/DL (ref 13.3–17.7)
MCH RBC QN AUTO: 31.6 PG (ref 26.5–33)
MCHC RBC AUTO-ENTMCNC: 31.8 G/DL (ref 31.5–36.5)
MCV RBC AUTO: 99 FL (ref 78–100)
PLATELET # BLD AUTO: 152 10E3/UL (ref 150–450)
POTASSIUM SERPL-SCNC: 4.8 MMOL/L (ref 3.4–5.3)
PROT SERPL-MCNC: 6.3 G/DL (ref 6.4–8.3)
RBC # BLD AUTO: 2.66 10E6/UL (ref 4.4–5.9)
SODIUM SERPL-SCNC: 127 MMOL/L (ref 135–145)
WBC # BLD AUTO: 7.1 10E3/UL (ref 4–11)

## 2023-10-29 PROCEDURE — 250N000013 HC RX MED GY IP 250 OP 250 PS 637: Performed by: HOSPITALIST

## 2023-10-29 PROCEDURE — 250N000009 HC RX 250

## 2023-10-29 PROCEDURE — 120N000002 HC R&B MED SURG/OB UMMC

## 2023-10-29 PROCEDURE — 85027 COMPLETE CBC AUTOMATED: CPT | Performed by: STUDENT IN AN ORGANIZED HEALTH CARE EDUCATION/TRAINING PROGRAM

## 2023-10-29 PROCEDURE — 36415 COLL VENOUS BLD VENIPUNCTURE: CPT | Performed by: STUDENT IN AN ORGANIZED HEALTH CARE EDUCATION/TRAINING PROGRAM

## 2023-10-29 PROCEDURE — 250N000012 HC RX MED GY IP 250 OP 636 PS 637: Performed by: HOSPITALIST

## 2023-10-29 PROCEDURE — 250N000013 HC RX MED GY IP 250 OP 250 PS 637

## 2023-10-29 PROCEDURE — 250N000013 HC RX MED GY IP 250 OP 250 PS 637: Performed by: STUDENT IN AN ORGANIZED HEALTH CARE EDUCATION/TRAINING PROGRAM

## 2023-10-29 PROCEDURE — 80053 COMPREHEN METABOLIC PANEL: CPT | Performed by: STUDENT IN AN ORGANIZED HEALTH CARE EDUCATION/TRAINING PROGRAM

## 2023-10-29 PROCEDURE — 99233 SBSQ HOSP IP/OBS HIGH 50: CPT | Performed by: STUDENT IN AN ORGANIZED HEALTH CARE EDUCATION/TRAINING PROGRAM

## 2023-10-29 PROCEDURE — 250N000011 HC RX IP 250 OP 636

## 2023-10-29 RX ORDER — MIDODRINE HYDROCHLORIDE 5 MG/1
10-20 TABLET ORAL 4 TIMES DAILY PRN
Status: DISCONTINUED | OUTPATIENT
Start: 2023-10-29 | End: 2023-10-29

## 2023-10-29 RX ORDER — MIDODRINE HYDROCHLORIDE 5 MG/1
10-20 TABLET ORAL 4 TIMES DAILY PRN
Status: DISCONTINUED | OUTPATIENT
Start: 2023-10-29 | End: 2023-10-31

## 2023-10-29 RX ORDER — OXYCODONE HYDROCHLORIDE 5 MG/1
5 TABLET ORAL 2 TIMES DAILY PRN
Status: DISCONTINUED | OUTPATIENT
Start: 2023-10-29 | End: 2023-10-31

## 2023-10-29 RX ADMIN — SEVELAMER CARBONATE 800 MG: 800 TABLET, FILM COATED ORAL at 12:06

## 2023-10-29 RX ADMIN — SEVELAMER CARBONATE 800 MG: 800 TABLET, FILM COATED ORAL at 09:11

## 2023-10-29 RX ADMIN — LIDOCAINE HYDROCHLORIDE 1 ML: 20 JELLY TOPICAL at 06:09

## 2023-10-29 RX ADMIN — GABAPENTIN 300 MG: 300 CAPSULE ORAL at 21:08

## 2023-10-29 RX ADMIN — OXYCODONE HYDROCHLORIDE 5 MG: 5 TABLET ORAL at 21:08

## 2023-10-29 RX ADMIN — APIXABAN 5 MG: 5 TABLET, FILM COATED ORAL at 21:08

## 2023-10-29 RX ADMIN — PANTOPRAZOLE SODIUM 40 MG: 40 TABLET, DELAYED RELEASE ORAL at 09:10

## 2023-10-29 RX ADMIN — Medication 1 CAPSULE: at 09:10

## 2023-10-29 RX ADMIN — HYDROXYZINE HYDROCHLORIDE 25 MG: 25 TABLET ORAL at 12:06

## 2023-10-29 RX ADMIN — SEVELAMER CARBONATE 800 MG: 800 TABLET, FILM COATED ORAL at 16:36

## 2023-10-29 RX ADMIN — LACOSAMIDE 50 MG: 50 TABLET, FILM COATED ORAL at 09:11

## 2023-10-29 RX ADMIN — APIXABAN 5 MG: 5 TABLET, FILM COATED ORAL at 09:10

## 2023-10-29 RX ADMIN — HYDROXYZINE HYDROCHLORIDE 25 MG: 25 TABLET ORAL at 16:36

## 2023-10-29 RX ADMIN — LACOSAMIDE 100 MG: 50 TABLET, FILM COATED ORAL at 21:08

## 2023-10-29 RX ADMIN — OXYCODONE HYDROCHLORIDE 5 MG: 5 TABLET ORAL at 06:08

## 2023-10-29 RX ADMIN — TACROLIMUS 0.5 MG: 0.5 CAPSULE ORAL at 17:53

## 2023-10-29 RX ADMIN — HYDROXYZINE HYDROCHLORIDE 25 MG: 25 TABLET ORAL at 21:08

## 2023-10-29 RX ADMIN — CEFEPIME HYDROCHLORIDE 1 G: 1 INJECTION, POWDER, FOR SOLUTION INTRAMUSCULAR; INTRAVENOUS at 17:55

## 2023-10-29 RX ADMIN — TACROLIMUS 0.5 MG: 0.5 CAPSULE ORAL at 09:10

## 2023-10-29 RX ADMIN — MIDODRINE HYDROCHLORIDE 10 MG: 5 TABLET ORAL at 17:53

## 2023-10-29 ASSESSMENT — ACTIVITIES OF DAILY LIVING (ADL)
ADLS_ACUITY_SCORE: 20

## 2023-10-29 NOTE — PLAN OF CARE
Goal Outcome Evaluation: Pt alert and oriented x4. Pain to left arm but declined prn intervention earlier in shift. Ambulated in halls independently. Pt concerned about blood pressure being low. Requesting prn midodrine. PRN midodrine administered x1. S/p paracentesis earlier in day. Continue to monitor BP. Assess pain and effectiveness of interventions.

## 2023-10-29 NOTE — PLAN OF CARE
Goal Outcome Evaluation:      Plan of Care Reviewed With: patient    Overall Patient Progress: no changeOverall Patient Progress: no change    Outcome Evaluation: Pt A&Ox4 with VSS. pain managed with prn po oyxcodone and lidocaine gel. 1x BM per pt overnight. refused CPAP overnight, team notified. Paracentesis dressing CDI. Plan will be to discharge home with OP dialysis once medically ready.

## 2023-10-29 NOTE — PROGRESS NOTES
[facility]    Medicine Progress Note - Medicine Service, MANUEL TEAM 4    Date of Admission:  10/23/2023    Assessment & Plan   Blanco Osborne is a 58 year old male admitted on 10/23/2023. 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 2016 now with progressive cirrhosis (liver bx 12/2022) with evidence of portal hypertension and ascites, paroxysmal atrial fibrillation on Eliquis, DM type 2, history of CVA, hypertension, CHRISTIAN on BiPAP, chronic pericardial effusion, ESRD on dialysis who presented to [facility] ED on 10/5/23 with chest discomfort and palpitations, admitted with severe sepsis with septic shock requiring pressors secondary to multifactorial pneumonia, atrial fibrillation with aberrancy s/p 2 failed attempts at cardioversion, worsening pericardial effusion on ECHO s/p pericardiocentesis 10/11, and PE on apixaban. 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     Update today:  - Continue cefepime 1g q24h through 10/30  - SBP prophylaxis after cefepime completed     # SBP   # Chylous ascites   # Abdominal pain   Ascites likely related to portal HTN in the setting of cirrhosis, further c/b disruption of lymphatics (d/t increased lymph flow and portal HTN seen with cirrhosis) leading to chylous ascites as reflective of persistently elevated triglyceride. Has been on different antibiotics (see admission note).  Diagnostic and therapeutic paracentesis repeated multiple times during hospitalization with down-trending PMN count suggesting some improvement with antibiotics. PMNs on 10/28 are improved to 203.  - Hepatology consulted, appreciate recs  - Transplant Infectious disease consulted, appreciate recs  - Continue Cefepime 1g Q24h through 10/30        # Cirrhosis secondary to  NAFLD s/p liver transplant 2016 now with recurrent cirrhosis with evidence of portal hypertension and ascites   #Immunosuppression  Patient follows with Dr. Simms, though notably has not been seen in years; last clinic visit 2020 with plan for 6 month follow up. Next appt 2023. Evidence of recurrent cirrhosis on liver biopsy 2022, he did have alcohol use post transplant and continues to have metabolic syndrome that can also contribute. Continues to have splenomegaly on recent CT.  No evidence of HCC.  - Per hepatology note, not a candidate for KT or SLK  MELD 3.0: 31 at 10/29/2023  7:45 AM  MELD-Na: 31 at 10/29/2023  7:45 AM  Calculated from:  Serum Creatinine: On dialysis. Using the maximum value.  Serum Sodium: 127 mmol/L at 10/29/2023  7:45 AM  Total Bilirubin: 0.4 mg/dL (Using min of 1 mg/dL) at 10/29/2023  7:45 AM  Serum Albumin: 3.1 g/dL at 10/29/2023  7:45 AM  INR(ratio): 1.67 at 10/27/2023  6:55 AM  Age at listin years  Sex: Male at 10/29/2023  7:45 AM     # Moderate protein-energy malnutrition  - Nutrition consulted,  appreciate recs  - 2gm NA and very low fat diet  - GI wanted to continue very low fat diet, as do not expect TG to change quickly   - Likely plan to continue very low fat (LCT) diet for 2-3 weeks then liberalize fat restriction for LCT challenge and repeat TG on next paracentesis to evaluate if chylous ascites persists.     #Left arm fistula sight  pain and swelling  Swelling and tenderness on the left arm fistula site which was initially concerning for fistula site infection however imaging consistent with hematoma.  - 10/23 Blood Cultures NGTD  - Ice / Heat to affected area  - Lidocaine topical to affected area  - Wean oxycodone 5mg BID prn      # Concern for multifocal pneumonia, organism unspecified  - CT scan showed pulmonary infiltrates   - COVID 19/influenza/RSV negative  - Unable to provide sputum specimen  - Abx as detailed above     # Recurrent pericardial effusion  #  Paroxysmal atrial fibrillation   # NSTEMI  Troponin did peak at 346 on 10/05 (thought to be due to demand because of shock and EKG did not had any ST elevation). ECHO 10/10 with recurrent pericardial effusion slightly larger than seen on ECHO 10/8 s/p pericardiocentesis 10/11 with 560cc removed, s/p RHC 10/11 which showed normal right and left sided filling pressures, mild elevated pulmonary pressures and portal hypertension with hepatic vein pressure gradient of 10mmHg. Drain removed on 10/12. Repeat ECHO 10/17 with no recurrent pericardial effusion, LVEF >70%.  - If patient requires repeat pericardiocentesis in future then send for AFB/fungal culture, 16 S and 28 S  - Cardiology had signed off at previous hospital  - Continue PTA eliquis 5mg BID      # Pulmonary embolism diagnosed 10/5/2023  - Continue PTA eliquis 5mg BID     #Previous embolic stroke in 11/2022     # ESRD on HD MWF  # S/p left AV fistula pseudoaneurysm repair, 8/2023  # LUE AV fistula infiltrated on 10/6  # On kidney transplant list  - Dialysis per nephrology  - Continue renal multivitamin  - Continue PTA sevelamer 800mg QID     # Severe sepsis with septic shock - due to multifactorial pneumonia -resolved  # Chronic hypotension, on midodrine  - Is normally on midodrine for chronic hypotension, was adjusted multiple times during this hospitalization.     - Off pressors since 10/7/2023  - Random cortisol level checked on 10/13, which was normal     # Pain at multiple locations  Has pain in left upper extremity due to infiltration of AV fistula, pain in right upper extremity due to thrombophlebitis and frequent lab draws.  - Tylenol 650mg Q8h PRN (keep daily < 2g) for pain  - Wean Oxycodone 5mg PO BID PRN for moderate pain     # Probable CHRISTIAN  Per patient's wife, he had a sleep study about 10 years ago but does not have any CPAP or BiPAP at home.  - CPAP ordered for sleep  - Should undergo sleep study as outpatient after discharge     # Anemia of  chronic disease  Hgb baseline between 8-9  - Hemoglobin is stable on monitoring     # Chronic Thrombocytopenia  Likely due to liver disease.   - Continue to monitor     # Restless leg syndrome  -Continue PTA ropinirole     # Seizure disorder  - Continue PTA Vimpat     # GERD  -Continue PTA PPI          Diet: Combination Diet Renal Diet (dialysis); 2 gm NA Diet; Very Low Fat Diet (up to 10g)  Snacks/Supplements Adult: Ensure Clear; With Meals  Snacks/Supplements Adult: Gelatein Plus; Between Meals  Room Service  Fluid restriction 1200 ML FLUID    DVT Prophylaxis: DOAC  Nixon Catheter: Not present  Lines: PRESENT      Hemodialysis Vascular Access Right Subclavian-Site Assessment: WDL  Hemodialysis Vascular Access Arteriovenous fistula Left Forearm-Site Assessment: WDL except;Painful      Cardiac Monitoring: None  Code Status: Full Code      Clinically Significant Risk Factors              # Hypoalbuminemia: Lowest albumin = 2.8 g/dL at 10/22/2023  9:59 AM, will monitor as appropriate  # Coagulation Defect: INR = 1.67 (Ref range: 0.85 - 1.15) and/or PTT = 47 Seconds (Ref range: 22 - 38 Seconds), will monitor for bleeding    # Hypertension: Noted on problem list        # Overweight: Estimated body mass index is 26.09 kg/m  as calculated from the following:    Height as of 10/13/23: 1.829 m (6').    Weight as of this encounter: 87.3 kg (192 lb 6.4 oz).   # Moderate Malnutrition: based on nutrition assessment    # Financial/Environmental Concerns: none         Disposition Plan     Expected Discharge Date: 10/30/2023      Destination: home;home with family  Discharge Comments: Dispo: Home with OP Dialysis  Plan: Sepsis - IV Abx; OP HD - [facility]  Progress: Plan to continue IV abx though 10/30 & para on Saturday            Starla Aguirre MD  Medicine Service, MAROON TEAM 4  [facility]  Securely message with [employer] (more info)  Text page via [employer] Paging/Directory   See  signed in provider for up to date coverage information  ______________________________________________________________________    Interval History   No acute events overnight. Blanco is feeling well and has been up out of bed frequently. He has many questions about his transplant candidacy. He would like to meet with the hepatology team tomorrow and is wondering about a second opinion. He denies any abdominal pain, nausea, vomiting.     Physical Exam   Vital Signs: Temp: 98.1  F (36.7  C) Temp src: Oral BP: 100/59 Pulse: 68   Resp: 16 SpO2: 96 % O2 Device: None (Room air)    Weight: 192 lbs 6.4 oz    General Appearance: Alert, talkative, NAD.  Respiratory: CTAB. No wheezing or crackles.   Cardiovascular: RRR. Normal S1/S2.  GI: Soft, mildly distended, non-tender. No guarding or rebound.   Skin: Left fistula with hematoma.   Other: Oriented x3.     Medical Decision Making       55 MINUTES SPENT BY ME on the date of service doing chart review, history, exam, documentation & further activities per the note.      Data     I have personally reviewed the following data over the past 24 hrs:    7.1  \   8.4 (L)   / 152     127 (L) 91 (L) 64.9 (H) /  114 (H)   4.8 22 6.68 (H) \     ALT: <5 AST: 18 AP: 228 (H) TBILI: 0.4   ALB: 3.1 (L) TOT PROTEIN: 6.3 (L) LIPASE: N/A

## 2023-10-29 NOTE — PROVIDER NOTIFICATION
Provider notified (name): Teo  Reason for notification: Pt reporting feeling dizzy with ambulation and is requesting midodrine. SBP >90.   Recommendation/request given to provider: Update prn midodrine order to include administration when feeling symptomatic   Response from provider: Midodrine order updated

## 2023-10-29 NOTE — PLAN OF CARE
Assumed cares [id number]. A&Ox4. Forgetful. Stable on RA. Denied pain. Up ad kaelyn - walking in halls x2. Renal, 2 g Na, low fat diet w/ 1.2 L FR. Para site CDI. Atarax x1 for itching. R PIV saline locked. Plan to discharge home after dialysis tomorrow.     Goal Outcome Evaluation:      Plan of Care Reviewed With: patient    Overall Patient Progress: improving

## 2023-10-30 ENCOUNTER — ANCILLARY PROCEDURE (OUTPATIENT)
Dept: ULTRASOUND IMAGING | Facility: CLINIC | Age: 58
End: 2023-10-30
Payer: COMMERCIAL

## 2023-10-30 LAB
ABSOLUTE NEUTROPHILS, BODY FLUID: 448 /UL
ALBUMIN BODY FLUID SOURCE: NORMAL
ALBUMIN FLD-MCNC: 1.4 G/DL
ALBUMIN SERPL BCG-MCNC: 2.9 G/DL (ref 3.5–5.2)
ALP SERPL-CCNC: 241 U/L (ref 40–129)
ALT SERPL W P-5'-P-CCNC: <5 U/L (ref 0–70)
AMMONIA PLAS-SCNC: 37 UMOL/L (ref 16–60)
ANION GAP SERPL CALCULATED.3IONS-SCNC: 15 MMOL/L (ref 7–15)
APPEARANCE FLD: ABNORMAL
AST SERPL W P-5'-P-CCNC: 15 U/L (ref 0–45)
BILIRUB SERPL-MCNC: 0.3 MG/DL
BUN SERPL-MCNC: 79.9 MG/DL (ref 8–23)
CALCIUM SERPL-MCNC: 8.8 MG/DL (ref 8.6–10)
CELL COUNT BODY FLUID SOURCE: ABNORMAL
CHLORIDE SERPL-SCNC: 89 MMOL/L (ref 98–107)
COLOR FLD: YELLOW
CREAT SERPL-MCNC: 7.92 MG/DL (ref 0.67–1.17)
DEPRECATED HCO3 PLAS-SCNC: 20 MMOL/L (ref 22–29)
EGFRCR SERPLBLD CKD-EPI 2021: 7 ML/MIN/1.73M2
ERYTHROCYTE [DISTWIDTH] IN BLOOD BY AUTOMATED COUNT: 16.5 % (ref 10–15)
GLUCOSE SERPL-MCNC: 91 MG/DL (ref 70–99)
HBV SURFACE AB SERPL IA-ACNC: 0.42 M[IU]/ML
HBV SURFACE AB SERPL IA-ACNC: NONREACTIVE M[IU]/ML
HBV SURFACE AG SERPL QL IA: NONREACTIVE
HCT VFR BLD AUTO: 25.1 % (ref 40–53)
HGB BLD-MCNC: 7.8 G/DL (ref 13.3–17.7)
LYMPHOCYTES NFR FLD MANUAL: 47 %
MCH RBC QN AUTO: 31.5 PG (ref 26.5–33)
MCHC RBC AUTO-ENTMCNC: 31.1 G/DL (ref 31.5–36.5)
MCV RBC AUTO: 101 FL (ref 78–100)
MONOS+MACROS NFR FLD MANUAL: 15 %
NEUTS BAND NFR FLD MANUAL: 38 %
PATH REPORT.COMMENTS IMP SPEC: ABNORMAL
PATH REPORT.COMMENTS IMP SPEC: YES
PATH REPORT.FINAL DX SPEC: ABNORMAL
PATH REPORT.MICROSCOPIC SPEC OTHER STN: ABNORMAL
PATH REPORT.RELEVANT HX SPEC: ABNORMAL
PLATELET # BLD AUTO: 128 10E3/UL (ref 150–450)
POTASSIUM SERPL-SCNC: 5.6 MMOL/L (ref 3.4–5.3)
POTASSIUM SERPL-SCNC: 6.1 MMOL/L (ref 3.4–5.3)
PROT FLD-MCNC: 2.6 G/DL
PROT SERPL-MCNC: 5.9 G/DL (ref 6.4–8.3)
PROTEIN BODY FLUID SOURCE: NORMAL
RBC # BLD AUTO: 2.48 10E6/UL (ref 4.4–5.9)
SODIUM SERPL-SCNC: 124 MMOL/L (ref 135–145)
TACROLIMUS BLD-MCNC: 3.8 UG/L (ref 5–15)
TME LAST DOSE: ABNORMAL H
TME LAST DOSE: ABNORMAL H
WBC # BLD AUTO: 5.5 10E3/UL (ref 4–11)
WBC # FLD AUTO: 1179 /UL

## 2023-10-30 PROCEDURE — 82140 ASSAY OF AMMONIA: CPT | Performed by: STUDENT IN AN ORGANIZED HEALTH CARE EDUCATION/TRAINING PROGRAM

## 2023-10-30 PROCEDURE — 80053 COMPREHEN METABOLIC PANEL: CPT | Performed by: STUDENT IN AN ORGANIZED HEALTH CARE EDUCATION/TRAINING PROGRAM

## 2023-10-30 PROCEDURE — 120N000002 HC R&B MED SURG/OB UMMC

## 2023-10-30 PROCEDURE — 87070 CULTURE OTHR SPECIMN AEROBIC: CPT

## 2023-10-30 PROCEDURE — 49083 ABD PARACENTESIS W/IMAGING: CPT | Performed by: PEDIATRICS

## 2023-10-30 PROCEDURE — 0W9G3ZX DRAINAGE OF PERITONEAL CAVITY, PERCUTANEOUS APPROACH, DIAGNOSTIC: ICD-10-PCS | Performed by: PEDIATRICS

## 2023-10-30 PROCEDURE — 250N000013 HC RX MED GY IP 250 OP 250 PS 637: Performed by: STUDENT IN AN ORGANIZED HEALTH CARE EDUCATION/TRAINING PROGRAM

## 2023-10-30 PROCEDURE — 84157 ASSAY OF PROTEIN OTHER: CPT

## 2023-10-30 PROCEDURE — 634N000001 HC RX 634: Mod: JZ | Performed by: STUDENT IN AN ORGANIZED HEALTH CARE EDUCATION/TRAINING PROGRAM

## 2023-10-30 PROCEDURE — 89051 BODY FLUID CELL COUNT: CPT

## 2023-10-30 PROCEDURE — 85027 COMPLETE CBC AUTOMATED: CPT | Performed by: STUDENT IN AN ORGANIZED HEALTH CARE EDUCATION/TRAINING PROGRAM

## 2023-10-30 PROCEDURE — 80197 ASSAY OF TACROLIMUS: CPT | Performed by: NURSE PRACTITIONER

## 2023-10-30 PROCEDURE — 250N000013 HC RX MED GY IP 250 OP 250 PS 637: Performed by: HOSPITALIST

## 2023-10-30 PROCEDURE — 86706 HEP B SURFACE ANTIBODY: CPT | Performed by: INTERNAL MEDICINE

## 2023-10-30 PROCEDURE — 87340 HEPATITIS B SURFACE AG IA: CPT | Performed by: INTERNAL MEDICINE

## 2023-10-30 PROCEDURE — 84132 ASSAY OF SERUM POTASSIUM: CPT

## 2023-10-30 PROCEDURE — 99233 SBSQ HOSP IP/OBS HIGH 50: CPT | Mod: GC | Performed by: STUDENT IN AN ORGANIZED HEALTH CARE EDUCATION/TRAINING PROGRAM

## 2023-10-30 PROCEDURE — 250N000011 HC RX IP 250 OP 636

## 2023-10-30 PROCEDURE — 82042 OTHER SOURCE ALBUMIN QUAN EA: CPT

## 2023-10-30 PROCEDURE — 93005 ELECTROCARDIOGRAM TRACING: CPT

## 2023-10-30 PROCEDURE — 250N000013 HC RX MED GY IP 250 OP 250 PS 637

## 2023-10-30 PROCEDURE — 99233 SBSQ HOSP IP/OBS HIGH 50: CPT | Performed by: NURSE PRACTITIONER

## 2023-10-30 PROCEDURE — 36415 COLL VENOUS BLD VENIPUNCTURE: CPT

## 2023-10-30 PROCEDURE — 36415 COLL VENOUS BLD VENIPUNCTURE: CPT | Performed by: STUDENT IN AN ORGANIZED HEALTH CARE EDUCATION/TRAINING PROGRAM

## 2023-10-30 PROCEDURE — 250N000012 HC RX MED GY IP 250 OP 636 PS 637: Performed by: HOSPITALIST

## 2023-10-30 PROCEDURE — 90935 HEMODIALYSIS ONE EVALUATION: CPT

## 2023-10-30 PROCEDURE — 258N000003 HC RX IP 258 OP 636: Performed by: STUDENT IN AN ORGANIZED HEALTH CARE EDUCATION/TRAINING PROGRAM

## 2023-10-30 PROCEDURE — 99233 SBSQ HOSP IP/OBS HIGH 50: CPT | Performed by: PHYSICIAN ASSISTANT

## 2023-10-30 RX ORDER — CIPROFLOXACIN 250 MG/1
250 TABLET, FILM COATED ORAL EVERY 24 HOURS
Qty: 90 TABLET | Refills: 0 | Status: SHIPPED | OUTPATIENT
Start: 2023-10-31 | End: 2024-01-29

## 2023-10-30 RX ORDER — ACETAMINOPHEN 325 MG/1
650 TABLET ORAL EVERY 8 HOURS PRN
COMMUNITY
Start: 2023-10-30

## 2023-10-30 RX ORDER — PANTOPRAZOLE SODIUM 40 MG/1
40 TABLET, DELAYED RELEASE ORAL
Qty: 90 TABLET | Refills: 0 | Status: SHIPPED | OUTPATIENT
Start: 2023-10-31 | End: 2023-11-22

## 2023-10-30 RX ORDER — LIDOCAINE HYDROCHLORIDE 10 MG/ML
20 INJECTION, SOLUTION EPIDURAL; INFILTRATION; INTRACAUDAL; PERINEURAL ONCE
OUTPATIENT
Start: 2023-10-30 | End: 2023-10-30

## 2023-10-30 RX ORDER — MEPERIDINE HYDROCHLORIDE 25 MG/ML
25 INJECTION INTRAMUSCULAR; INTRAVENOUS; SUBCUTANEOUS EVERY 30 MIN PRN
OUTPATIENT
Start: 2023-10-30

## 2023-10-30 RX ORDER — CIPROFLOXACIN 250 MG/1
250 TABLET, FILM COATED ORAL
Status: DISCONTINUED | OUTPATIENT
Start: 2023-10-31 | End: 2023-11-01 | Stop reason: HOSPADM

## 2023-10-30 RX ORDER — METHYLPREDNISOLONE SODIUM SUCCINATE 125 MG/2ML
125 INJECTION, POWDER, LYOPHILIZED, FOR SOLUTION INTRAMUSCULAR; INTRAVENOUS
Start: 2023-10-30

## 2023-10-30 RX ORDER — ALBUTEROL SULFATE 90 UG/1
1-2 AEROSOL, METERED RESPIRATORY (INHALATION)
Start: 2023-10-30

## 2023-10-30 RX ORDER — HEPARIN SODIUM,PORCINE 10 UNIT/ML
5-20 VIAL (ML) INTRAVENOUS DAILY PRN
OUTPATIENT
Start: 2023-10-30

## 2023-10-30 RX ORDER — DIPHENHYDRAMINE HYDROCHLORIDE 50 MG/ML
50 INJECTION INTRAMUSCULAR; INTRAVENOUS
Start: 2023-10-30

## 2023-10-30 RX ORDER — ALBUTEROL SULFATE 0.83 MG/ML
2.5 SOLUTION RESPIRATORY (INHALATION)
OUTPATIENT
Start: 2023-10-30

## 2023-10-30 RX ORDER — ALBUMIN (HUMAN) 12.5 G/50ML
12.5 SOLUTION INTRAVENOUS
OUTPATIENT
Start: 2023-10-30

## 2023-10-30 RX ORDER — EPINEPHRINE 1 MG/ML
0.3 INJECTION, SOLUTION, CONCENTRATE INTRAVENOUS EVERY 5 MIN PRN
OUTPATIENT
Start: 2023-10-30

## 2023-10-30 RX ORDER — HEPARIN SODIUM (PORCINE) LOCK FLUSH IV SOLN 100 UNIT/ML 100 UNIT/ML
5 SOLUTION INTRAVENOUS
OUTPATIENT
Start: 2023-10-30

## 2023-10-30 RX ORDER — LACTULOSE 10 G/15ML
20 SOLUTION ORAL ONCE
Status: COMPLETED | OUTPATIENT
Start: 2023-10-30 | End: 2023-10-30

## 2023-10-30 RX ORDER — LACTULOSE 10 G/15ML
20 SOLUTION ORAL 2 TIMES DAILY
Status: DISCONTINUED | OUTPATIENT
Start: 2023-10-31 | End: 2023-11-01 | Stop reason: HOSPADM

## 2023-10-30 RX ADMIN — TACROLIMUS 0.5 MG: 0.5 CAPSULE ORAL at 07:47

## 2023-10-30 RX ADMIN — GABAPENTIN 300 MG: 300 CAPSULE ORAL at 20:40

## 2023-10-30 RX ADMIN — MIDODRINE HYDROCHLORIDE 10 MG: 5 TABLET ORAL at 14:46

## 2023-10-30 RX ADMIN — APIXABAN 5 MG: 5 TABLET, FILM COATED ORAL at 20:40

## 2023-10-30 RX ADMIN — SODIUM CHLORIDE 300 ML: 9 INJECTION, SOLUTION INTRAVENOUS at 08:12

## 2023-10-30 RX ADMIN — HYDROXYZINE HYDROCHLORIDE 25 MG: 25 TABLET ORAL at 14:50

## 2023-10-30 RX ADMIN — LACOSAMIDE 100 MG: 50 TABLET, FILM COATED ORAL at 20:40

## 2023-10-30 RX ADMIN — PANTOPRAZOLE SODIUM 40 MG: 40 TABLET, DELAYED RELEASE ORAL at 07:48

## 2023-10-30 RX ADMIN — SEVELAMER CARBONATE 800 MG: 800 TABLET, FILM COATED ORAL at 12:39

## 2023-10-30 RX ADMIN — Medication 1 CAPSULE: at 07:47

## 2023-10-30 RX ADMIN — SEVELAMER CARBONATE 800 MG: 800 TABLET, FILM COATED ORAL at 15:50

## 2023-10-30 RX ADMIN — SODIUM CHLORIDE 250 ML: 9 INJECTION, SOLUTION INTRAVENOUS at 08:12

## 2023-10-30 RX ADMIN — SEVELAMER CARBONATE 800 MG: 800 TABLET, FILM COATED ORAL at 07:47

## 2023-10-30 RX ADMIN — CEFEPIME HYDROCHLORIDE 1 G: 1 INJECTION, POWDER, FOR SOLUTION INTRAMUSCULAR; INTRAVENOUS at 13:54

## 2023-10-30 RX ADMIN — OXYCODONE HYDROCHLORIDE 5 MG: 5 TABLET ORAL at 05:33

## 2023-10-30 RX ADMIN — MIDODRINE HYDROCHLORIDE 10 MG: 5 TABLET ORAL at 07:47

## 2023-10-30 RX ADMIN — EPOETIN ALFA-EPBX 8000 UNITS: 10000 INJECTION, SOLUTION INTRAVENOUS; SUBCUTANEOUS at 09:59

## 2023-10-30 RX ADMIN — MIDODRINE HYDROCHLORIDE 20 MG: 5 TABLET ORAL at 09:58

## 2023-10-30 RX ADMIN — MIDODRINE HYDROCHLORIDE 20 MG: 5 TABLET ORAL at 20:51

## 2023-10-30 RX ADMIN — TACROLIMUS 0.5 MG: 0.5 CAPSULE ORAL at 17:20

## 2023-10-30 RX ADMIN — HYDROXYZINE HYDROCHLORIDE 25 MG: 25 TABLET ORAL at 12:46

## 2023-10-30 RX ADMIN — LACTULOSE 20 G: 20 SOLUTION ORAL at 12:39

## 2023-10-30 RX ADMIN — Medication: at 08:13

## 2023-10-30 RX ADMIN — APIXABAN 5 MG: 5 TABLET, FILM COATED ORAL at 07:47

## 2023-10-30 RX ADMIN — LACOSAMIDE 50 MG: 50 TABLET, FILM COATED ORAL at 07:46

## 2023-10-30 ASSESSMENT — ACTIVITIES OF DAILY LIVING (ADL)
ADLS_ACUITY_SCORE: 20

## 2023-10-30 NOTE — PROCEDURES
[facility]  CAPS PROCEDURE NOTE  Date of Admission:  10/23/2023  Consult Requested by: Medicine  Reason for Consult: diagnostic evaluation of ascites    Indication/HPI:     59 yo M with decomp cirrhosis 2/2 etoh and mASH, admitted with SBP, now downtrending    Pre-Procedure Diagnosis: Ascites    Post-Procedure Diagnosis: Ascites    Risk Assessment: Average risk, Technically straightforward; patient's anticoagulation has been held according to guidelines based on the agent and platelets and coags are within guidelines    Procedure Outcome:  Diagnostic paracentesis performed. NST Dr Yessica Fernández and friend Jabari and wife Ofe bedside thru out procedure  See additional procedure details below.    The primary covering service should follow up and address any lab results as appropriate.    Stephen Burr MD  [facility]  Securely message with [employer] (more info)  Text page via [employer] Paging/Directory   See signed in provider for up to date coverage information      [facility]    Paracentesis    Date/Time: 10/30/2023 3:15 PM    Performed by: Stephen Burr MD  Authorized by: Stephen Burr MD    PRE-PROCEDURE DETAILS  Initial or subsequent exam: subsequent  Procedure purpose: therapeutic and diagnostic  Indications: abdominal discomfort secondary to ascites and suspected peritonitis        UNIVERSAL PROTOCOL   Site Marked: Yes  Prior Images Obtained and Reviewed:  Yes  Required items: Required blood products, implants, devices and special equipment available    Patient identity confirmed:  Verbally with patient, arm band and provided demographic data  NA - No sedation, light sedation, or local anesthesia  Confirmation Checklist:  Relevant allergies, patient's identity using two indicators, correct equipment/implants were available and procedure was appropriate and matched the  consent or emergent situation  Time out: Immediately prior to the procedure a time out was called    Universal Protocol: the Joint Commission Universal Protocol was followed    Preparation: Patient was prepped and draped in usual sterile fashion    ESBL (mL):  0     ANESTHESIA    Anesthesia:  Local infiltration  Local Anesthetic:  Lidocaine 1% without epinephrine  Anesthetic Total (mL):  6      SEDATION    Patient Sedated: No    POST PROCEDURE DETAILS  Needle gauge: 22 G.  Ultrasound guidance: yes  Puncture site: left lower quadrant  Fluid removed: 10(ml)  Fluid characteristics: yellow.  Dressing: bandage.        PROCEDURE  Describe Procedure: Consult and Procedure Service Note    The risks and benefits of the procedure were explained to patient who expressed understanding and opted to proceed. Confirmed consent was obtained and placed in the chart.  A time out was performed. An area of ascites was located with ultrasound and marked in the LLQ; the area was prepped and draped in the usual sterile fashion. A pocklet of ascites was located using ultrasound and 1% lidocaine was instilled a 25 g needle and using an 11 blade scapel a small skin incision was not made.   Subsequently a 5 Fr Yeuh catheter guided to fluid pocket under real time guidance; aspiration yeilded 10 ml yellow fluid obtained on the 1st attempt.   The specimen(s) left secured in lab bag at bedside, to be sent for analysis; RN updated, and the area dressed per CAPS routine                     Patient Tolerance:  Patient tolerated the procedure well with no immediate complications  Length of time physician/provider present for 1:1 monitoring during sedation: 0   Thank you for consulting the CAPS team. Please contact the Consult and Procedure Service if any concerns or complications arise.         POC US GUIDE FOR PARACENTESIS     Impression  US Indication: decompensated liver disease    FINDINGS:  Limited abdominal ultrasound was performed and  demonstrated an adequate fluid collection on the LLQ of the abdomen. Doppler of the skin demonstrated an area at this site without significant vasculature. A paracentesis at this site was subsequently performed.

## 2023-10-30 NOTE — PLAN OF CARE
Goal Outcome Evaluation: Pt alert and oriented x4. C/o pain to left arm fistula. PRN oxycodone administered x1 with adequate effect. C/o itching. PRN atarax administered x2. Ambulated in halls independently. Tolerating diet. Reported BM today. No void. Dialysis day tomorrow. Pt hoping to discharge home tomorrow.

## 2023-10-30 NOTE — PROGRESS NOTES
[facility]  HEPATOLOGY PROGRESS NOTE  Blanco Osborne [id number]       IMPRESSION:  Blanco Osborne is a 58 year old male with a history of DDLT 9/20/16 for MASH cirrhosis with post operative course complicated by alcohol use disorder, recurrent cirrhosis (liver biopsy 12/2022), HE, CKD on HD, PEA arrest (2022), p a-fib on eliquis, HTN, HLD, DM II, CHRISTIAN who was initially admitted to OSH with confusion following hypotension during HD and noted to need pericardiocentesis, had chylous ascites along with concern for SBP and transferred for ongoing liver transplant care.      RECOMMENDATIONS:  # DDLT 9/20/16 for MASH  # Immunosuppression  # Recurrent cirrhosis  -Solitary alk phos stable over the past year.  Liver tests other wise wnl.    - continue tacrolimus 0.5 mg BID, trough level 10/30 3.8.  goal trough level 3-5. He remains on HD. He will need new capsule order for 0.5 mg at discharge.   - evidence of recurrent cirrhosis on liver biopsy 12/2022, he did have alcohol use post transplant and continues to have metabolic syndrome that may also be contributing. Poor sample to differentiate cause, would not recommend repeating.  Peth has been negative, last +ETG 2017.  Has not had EGD posttransplant, this will be scheduled as OP.  Continues to have splenomegaly on recent CT.  No evidence of HCC.  - Not a candidate for SKT or SLK.      # Ascites  # SBP  # chylous ascites  - repeat paracentesis 10/28 with normal PMN's, continues to have elevated WBC. Has been seen by Tx ID, received x2 weeks of cefepime- last dose today.  Triglycerides decreased. Continue with low fat diet for now. Will be on ciprofloxacin, Ciprofloxacin 250 mg daily for SBP ppx.   - ascites with SAAG consistent with portal hypertension. Likely cause is related to cirrhosis. Higher total protein likely related to CKD on HD.   - Will order diagnostic para for OP.   - not a candidate for diuretics or TIPS due to CKD/HD.      # Hyponatremia  # CKD on  HD  -  followed by nephrology.     # Hepatic encephalopathy  - had normal mentation over the past week. Now with trace asterixis, even after HD. With recent events, recommend diagnostic eval for infection.   - agree with lactulose with goal of 2-3 BM's per day.     Patient was discussed with attending physician, Dr. Jigna Reid.  The above note reflects our joint plan.     Next follow up appointment: Dr. Simms 12/29/2023    Thank you for the opportunity to be involved with  Blanco CENTENO Dylon care. Please call with any questions or concerns.    Jacklyn Liu, APRN, CNP  Inpatient Hepatology [facility]        SUBJECTIVE:  States he is still in shock from recent discussions about cirrhosis and transplant candidacy. Continues to have abdominal pain that has not changed since admission. No nausea or vomiting.     ROS:  A 10-point review of systems was negative.    OBJECTIVE:  VS: BP 99/67   Pulse 75   Temp 98  F (36.7  C) (Oral)   Resp 10   Wt 87.5 kg (192 lb 14.4 oz)   SpO2 98%   BMI 26.16 kg/m        General: In no acute distress, mild facial muscle wasting  Neuro: AOx3, mild difficulty following conversation, trace asterixis  HEENT:  Noscleral icterus, Nooral lesions  CV:  Skin warm and dry  Lungs:  Respirations even and nonlabored on room air  Abd:  Mildly distended, nontender   Extrem: Noperipheral edema   Skin: Nojaundice  Psych: pleasant    MEDICATIONS:  Current Facility-Administered Medications   Medication    acetaminophen (TYLENOL) tablet 650 mg    apixaban ANTICOAGULANT (ELIQUIS) tablet 5 mg    camphor-menthol (DERMASARRA) lotion    ceFEPIme (MAXIPIME) 1 g vial to attach to  mL bag for ADULTS or NS 50 mL bag for PEDS    gabapentin (NEURONTIN) capsule 300 mg    hydrOXYzine (ATARAX) tablet 25 mg    lacosamide (VIMPAT) tablet 100 mg    lacosamide (VIMPAT) tablet 50 mg    melatonin tablet 3 mg    midodrine (PROAMATINE) tablet 10-20 mg    midodrine (PROAMATINE) tablet 10-20 mg    multivitamin RENAL  (TRIPHROCAPS) capsule 1 capsule    naloxone (NARCAN) injection 0.2 mg    Or    naloxone (NARCAN) injection 0.4 mg    Or    naloxone (NARCAN) injection 0.2 mg    Or    naloxone (NARCAN) injection 0.4 mg    ondansetron (ZOFRAN ODT) ODT tab 4 mg    Or    ondansetron (ZOFRAN) injection 4 mg    oxyCODONE (ROXICODONE) tablet 5 mg    pantoprazole (PROTONIX) EC tablet 40 mg    polyethylene glycol (MIRALAX) Packet 17 g    rOPINIRole (REQUIP) tablet 0.5 mg    senna-docusate (SENOKOT-S/PERICOLACE) 8.6-50 MG per tablet 1 tablet    Or    senna-docusate (SENOKOT-S/PERICOLACE) 8.6-50 MG per tablet 2 tablet    sevelamer carbonate (RENVELA) tablet 800 mg    tacrolimus (GENERIC) capsule 0.5 mg       REVIEW OF LABORATORY, PATHOLOGY AND IMAGING RESULTS:  BMP  Recent Labs   Lab 10/30/23  0735 10/30/23  0459 10/29/23  0745 10/28/23  0726 10/27/23  0506   NA  --  124* 127* 130* 126*   POTASSIUM 5.6* 6.1* 4.8 4.7 5.4*   CHLORIDE  --  89* 91* 94* 91*   KELLY  --  8.8 9.3 9.0 8.9   CO2  --  20* 22 25 23   BUN  --  79.9* 64.9* 43.8* 67.8*   CR  --  7.92* 6.68* 4.81* 6.42*   GLC  --  91 114* 93 94     CBC  Recent Labs   Lab 10/30/23  0459 10/29/23  0745 10/28/23  0726 10/27/23  0506   WBC 5.5 7.1 5.4 4.6   RBC 2.48* 2.66* 2.88* 2.65*   HGB 7.8* 8.4* 9.1* 8.4*   HCT 25.1* 26.4* 29.0* 26.8*   * 99 101* 101*   MCH 31.5 31.6 31.6 31.7   MCHC 31.1* 31.8 31.4* 31.3*   RDW 16.5* 17.2* 17.3* 16.8*   * 152 168 131*     INR  Recent Labs   Lab 10/27/23  0655 10/26/23  0525   INR 1.67* 1.70*     LFTs  Recent Labs   Lab 10/30/23  0459 10/29/23  0745 10/28/23  0726 10/27/23  0506   ALKPHOS 241* 228* 208* 178*   AST 15 18 16 14   ALT <5 <5 6 <5   BILITOTAL 0.3 0.4 0.4 0.3   PROTTOTAL 5.9* 6.3* 6.6 6.0*   ALBUMIN 2.9* 3.1* 3.2* 2.9*        MELD 3.0: 31 at 10/29/2023  7:45 AM  MELD-Na: 31 at 10/29/2023  7:45 AM  Calculated from:  Serum Creatinine: On dialysis. Using the maximum value.  Serum Sodium: 127 mmol/L at 10/29/2023  7:45 AM  Total  "Bilirubin: 0.4 mg/dL (Using min of 1 mg/dL) at 10/29/2023  7:45 AM  Serum Albumin: 3.1 g/dL at 10/29/2023  7:45 AM  INR(ratio): 1.67 at 10/27/2023  6:55 AM  Age at listin years  Sex: Male at 10/29/2023  7:45 AM    Previous work-up:   Lab Results   Component Value Date    HEPBANG Nonreactive 10/30/2023    HBCAB Nonreactive 2023    AUSAB 0.42 10/30/2023    HCVAB  2016     Nonreactive   Assay performance characteristics have not been established for newborns,   infants, and children      HCVRNA Not Detected 2022    HOSSEIN 423 (H) 10/24/2023    IRONSAT 20 10/24/2023    TSH 4.89 (H) 2023    CHOL 87 10/13/2023    HDL 42 10/13/2023    LDL 32 10/13/2023    TRIG 65 10/13/2023    A1C 4.7 08/10/2023    POPETH <10 10/20/2023    PLPETH <10 10/20/2023      No results found for: \"SPECDES\", \"LDRESULTS\"      Imaging Results:  None current    "

## 2023-10-30 NOTE — PLAN OF CARE
Goal Outcome Evaluation:       Admitted for sepsis. Administered last dose of IV abx. Pt denies pain. Pt c/o itching. Administered atarax and applied sarna lotion. Soft BP, symptomatic. Administered midodrine prior to HD this AM and in afternoon. Diagnostic para completed today. Wife requesting to stay away from oxycodone tonight d/t pt being more confused and lethargic today. Plan to discharge home tomorrow.

## 2023-10-30 NOTE — PROGRESS NOTES
"CLINICAL NUTRITION SERVICES - REASSESSMENT NOTE     Nutrition Prescription    RECOMMENDATIONS FOR MDs/PROVIDERS TO ORDER:  Patient with restricted 10 gm fat diet (due to chylous Ascites) and is at risk for EFAD if maintained on long term >2-3 weeks regimen of fat free per day diet.      Malnutrition Status:    Moderate malnutrition in the context of acute presentation     Recommendations already ordered by Registered Dietitian (RD):  Continue with ensure clear TID + Gelatin plus TID to optimize protein intake:  Ensure Clear contains 240 kcal, 8 grams protein, 52 grams carbohydrate, 0 grams fat, 0 grams fiber per 8 fl oz ( 0 mg K+, 188 mg phos)  Gelatin plus contains 120 ml fluid, 150 kcal, 20 gm protein, 21 gm carb, 0 gm fat per 4 oz container  ( 150 mg K+, 2 mg phos)    Future/Additional Recommendations:  Ability to upgrade diet        EVALUATION OF THE PROGRESS TOWARD GOALS   Diet: Dialysis diet + FR 1200 ml /day + 10 gm fat /day diet since 10/23  -  ( Per GI note, \" plan to continue very low fat (LCT) diet for 2-3 weeks then liberalize fat restriction for LCT challenge and repeat TG on next paracentesis to evaluate if chylous ascites persists\")     Oral nutrition supplements: Ensure Clear (apple) TID and Gelatin Plus (pineapple) TID   Room service with assist    Intake: unable to see patient, he was off floor in HD. Per nursing flow sheet, patient is consuming 100% of multiple restricted diet. Suspect patient is unable to order much.        NEW FINDINGS   Transferred from , chart reviewed: Admitted on 10/23/2023.   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       Wt trend:  EDW: ~ 85 kg   Admit standing wt: 86.6 kg (190 lb 14.7 oz) on 10/23 --> 87.5 kg (192 lb 14.4 oz) on 10/29 standing wt    Lab noted:   K+: 5.6 (H)  BUN:79.9, Cr: 7.92, GFR: 7 -> as of this am, before HD  Na+: 124 (L)  Total bili: 0.3  B ( DM2 history), A1C: 4.3 ( normal on 8/10/23)    Stool/GI:  Last BM x1 yesterday     MALNUTRITION  % Intake: Unable to assess given limited options on multiple restricted diet , likely insufficient   % Weight Loss: None noted  Subcutaneous Fat Loss:  Previous RD: Facial region:  mild   Muscle Loss: Previous RD: Temporal:  mild and Thoracic region (clavicle, acromium bone, deltoid, trapezius, pectoral):  moderate   Fluid Accumulation/Edema: Mild  Malnutrition Diagnosis: Moderate malnutrition in the context of acute presentation     Previous Goals   Patient to consume % of nutritionally adequate meal trays TID, or the equivalent with supplements/snacks.   Evaluation: Met    Previous Nutrition Diagnosis  Increased nutrient needs (protein) related to dialysis and chylous ascites as evidenced by estimated protein needs at 1.5-2 g/kg.     Evaluation: No change    CURRENT NUTRITION DIAGNOSIS  Inadequate oral intake related to restrictive diet orders as evidenced by limited meal options on 10 gm fat /day diet + dialysis       INTERVENTIONS  Implementation  Medical food supplement therapy    Goals  Patient to consume % of nutritionally adequate meal trays TID, or the equivalent with supplements/snacks.    Monitoring/Evaluation  Progress toward goals will be monitored and evaluated per protocol.      Padma Hoyt RD/MEJIA  7C ([phone])/7D RD pager: [phone]  Weekend/Holiday RD pager: [phone]

## 2023-10-30 NOTE — PROGRESS NOTES
Nephrology Progress Note  10/30/2023         Assessment & Recommendations:   Blanco Osborne is a 58 year old male with PMH of cirrhosis secondary to NAFLD s/p liver transplant  in 2016 now with progressive cirrhosis (liver bx 12/2022) with evidence of portal hypertension and ascites, paroxysmal atrial fibrillation on Eliquis, DM type 2, PEA arrest 12/2022, history of CVA, HTN, CHRISTIAN on BiPAP, ESRD on dialysis who presented to [facility] ED on 10/5/23 with chest discomfort and palpitations, admitted with severe sepsis with septic shock requiring pressors secondary to multifactorial pneumonia, atrial fibrillation with aberrancy s/p 2 failed attempts at cardioversion, worsening pericardial effusion on ECHO s/p pericardiocentesis 10/11, and PE started on IV heparin. 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 Has required multiple taps with subsequent chylous ascites findings despite tailored diet. Transferred to [facility] to be seen by liver transplant team. Being treated for SBP. Deemed not a liver tx candidate     # ESKD: HD dependent ~ 1yr. Dialyzes MWF at [facility] with Dr. Bradshaw. EDW 85.5 kg. Access: tunneled RIJ (used L AVF today with 15g needles, no issues)  - Plan MWF dialysis         # Volume: EDW 85.5 kg (highly dependent on ascites at this point); newly developing chylous ascites; had RHC 10/11 at wt of 87.7 kg, normal R and L filling pressures, mild pulm HTN, L pericardial effusion; echo 10/17 with hyperkinetic EF > 70 and no pericardial effusion s/p drain  # Chronic hypotension  - requires midodrine before HD and often again during  - usual OP UF 2 to 3 kg  - s/p 2.7 kg para 10/24, para on 10/28 diagnostic only     # Hypervolemic hyponatremia: 124  - please strictly enforce 1200 ml fluid restriction and 2g Na diet  - will improve but not fully correct with HD    # Hyperkalemia:   - HD today  - pt is on renal diet but Ensures should be switched to  Mikey     # BMD: Ca 8's, alb 2.9, phos 3.1  - reduced sevelamer to 800 mg tid (not qid)     # Anemia of CKD: hgb 7-8's; iron studies 10/24: ferritin 423, iron 38, IS 20  - continue epo 8K per run  - hold venofer in setting of infection, but post infection would benefit from iron loading     # SBP: neutrophils improved to 203 on 10/28  - cefepime completed today 10/30 (may well be contributing to current increasing confusion due to neurotoxicity; cefepime is about 40% dialyzable)  # s/p liver transplant: now with cirrhosis, portal HTN, chylous ascites  - has been deemed not eligible for repeat liver tx        Recommendations were communicated to primary team via this note and JEANCARLOS Duff   Division of Renal Disease and Hypertension  [phone]    Interval History :   Seen on dialysis, stable run for 2.5 kg. Being treated for SBP, last day of cefepime today. Pt is more confused today, may be related to cefepime neurotoxicity. Used AVF with no issues today. No CP, SOB, n/v, chills    Review of Systems:   4 point ROS neg other than as noted above    Physical Exam:   I/O last 3 completed shifts:  In: 580 [P.O.:480; I.V.:100]  Out: -    BP 99/67   Pulse 75   Temp 98  F (36.7  C) (Oral)   Resp 10   Wt 87.5 kg (192 lb 14.4 oz)   SpO2 98%   BMI 26.16 kg/m       GENERAL APPEARANCE: NAD  EYES: no scleral icterus, pupils equal  Pulmonary: no increased WOB  CV: RRR   - Edema no peripheral  GI: soft, distended, NT  MS: no evidence of inflammation in joints, no muscle tenderness  : no gomez  SKIN: no rash, warm, dry, no cyanosis  NEURO: face symmetric, more confused  Access: CVC (newly revised L AVF working well)    Labs:   All labs reviewed by me  Electrolytes/Renal -   Recent Labs   Lab Test 10/30/23  0735 10/30/23  0459 10/29/23  0745 10/28/23  0726 10/24/23  0537 10/23/23  0654 10/06/23  1530 10/06/23  1040 10/06/23  0905 10/06/23  0533 10/05/23  0251 10/05/23  0245 04/25/23  1113  04/24/23  0815 04/19/23  1433 04/18/23  0928   NA  --  124* 127* 130*   < > 121*   < >  --   --  134*   < > 136   < > 138   < > 136   POTASSIUM 5.6* 6.1* 4.8 4.7   < > 5.5*   < >  --   --  4.6   < > 3.8   < > 4.9   < > 4.2   CHLORIDE  --  89* 91* 94*   < > 87*   < >  --   --  94*  --  90*   < > 98   < > 97*   CO2  --  20* 22 25   < > 19*   < >  --   --  23  --  24   < > 25   < > 25   BUN  --  79.9* 64.9* 43.8*   < > 87.9*   < >  --   --  60.5*  --  44.5*   < > 93.8*   < > 69.9*   CR  --  7.92* 6.68* 4.81*   < > 8.03*   < >  --   --  7.03*  --  5.77*   < > 7.83*   < > 5.62*   GLC  --  91 114* 93   < > 107*   < >  --    < > 152*   < > 127*   < > 97   < > 124*   KELLY  --  8.8 9.3 9.0   < > 8.8   < >  --   --  8.9  --  9.4   < > 10.4*   < > 10.3*   MAG  --   --   --   --   --   --   --   --   --   --   --  2.1  --  2.4*  --  2.0   PHOS  --   --   --   --   --  3.1  --  6.6*  --  6.7*  --  5.2*   < > 8.0*   < > 5.5*    < > = values in this interval not displayed.       CBC -   Recent Labs   Lab Test 10/30/23  0459 10/29/23  0745 10/28/23  0726   WBC 5.5 7.1 5.4   HGB 7.8* 8.4* 9.1*   * 152 168       LFTs -   Recent Labs   Lab Test 10/30/23  0459 10/29/23  0745 10/28/23  0726   ALKPHOS 241* 228* 208*   BILITOTAL 0.3 0.4 0.4   ALT <5 <5 6   AST 15 18 16   PROTTOTAL 5.9* 6.3* 6.6   ALBUMIN 2.9* 3.1* 3.2*       Iron Panel -   Recent Labs   Lab Test 10/24/23  0537 04/14/23  0725 04/06/23  0808 03/25/23  1058 03/12/23  0756   IRON 38* 60* 78  --  37*   IRONSAT 20 29  --   --  26   HOSSEIN 423* 286  --    < > 252    < > = values in this interval not displayed.         Imaging:  Reviewed    Current Medications:   apixaban ANTICOAGULANT  5 mg Oral BID    ceFEPIme  1 g Intravenous Q24H    [START ON 10/31/2023] ciprofloxacin  250 mg Oral Q24H BIJU    gabapentin  300 mg Oral At Bedtime    Lacosamide  100 mg Oral QPM    lacosamide  50 mg Oral QAM    multivitamin RENAL  1 capsule Oral Daily    pantoprazole  40 mg Oral QAM AC     sevelamer carbonate  800 mg Oral TID w/meals    tacrolimus  0.5 mg Oral BID IS       Teressa Olivas PA

## 2023-10-30 NOTE — PROGRESS NOTES
[facility]    Progress Note - Medicine Service, MAROON TEAM 4       Date of Admission:  10/23/2023    Assessment & Plan   Blanco Osborne is a 58 year old male admitted on 10/23/2023. 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 2016 now with progressive cirrhosis (liver bx 12/2022) with evidence of portal hypertension and ascites, paroxysmal atrial fibrillation on Eliquis, DM type 2, history of CVA, hypertension, CHRISTIAN on BiPAP, chronic pericardial effusion, ESRD on dialysis who presented to [facility] ED on 10/5/23 with chest discomfort and palpitations, admitted with severe sepsis with septic shock requiring pressors secondary to multifactorial pneumonia, atrial fibrillation with aberrancy s/p 2 failed attempts at cardioversion, worsening pericardial effusion on ECHO s/p pericardiocentesis 10/11, and PE on apixaban. 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     Update today:  - Interested in discussing several questions regarding future care further with hepatology team  - More confused today, checking amonia, giving lactulose, diagnostic para  - Continue cefepime 1g q24h through 10/30  - SBP prophylaxis after cefepime completed with PO ciprofloxacin     # SBP   # Chylous ascites   # Abdominal pain   Ascites likely related to portal HTN in the setting of cirrhosis, further c/b disruption of lymphatics (d/t increased lymph flow and portal HTN seen with cirrhosis) leading to chylous ascites as reflective of persistently elevated triglyceride. Has been on different antibiotics (see admission note).  Diagnostic and therapeutic paracentesis repeated multiple times during hospitalization with down-trending PMN count suggesting some improvement with antibiotics. PMNs on 10/28 are  improved to 203.  - Hepatology consulted, appreciate recs  - Transplant Infectious disease consulted, appreciate recs  - Continue Cefepime 1g Q24h through 10/30  - ciprofloxacin 250mg qday starting 10/31 for SBP prophylaxis        # Cirrhosis secondary to NAFLD s/p liver transplant 2016 now with recurrent cirrhosis with evidence of portal hypertension and ascites   #Immunosuppression  Patient follows with Dr. Simms, though notably has not been seen in years; last clinic visit 2020 with plan for 6 month follow up. Next appt 2023. Evidence of recurrent cirrhosis on liver biopsy 2022, he did have alcohol use post transplant and continues to have metabolic syndrome that can also contribute. Continues to have splenomegaly on recent CT.  No evidence of HCC.  - Per hepatology note, not a candidate for KT or SLK  MELD 3.0: 31 at 10/29/2023  7:45 AM  MELD-Na: 31 at 10/29/2023  7:45 AM  Calculated from:  Serum Creatinine: On dialysis. Using the maximum value.  Serum Sodium: 127 mmol/L at 10/29/2023  7:45 AM  Total Bilirubin: 0.4 mg/dL (Using min of 1 mg/dL) at 10/29/2023  7:45 AM  Serum Albumin: 3.1 g/dL at 10/29/2023  7:45 AM  INR(ratio): 1.67 at 10/27/2023  6:55 AM  Age at listin years  Sex: Male at 10/29/2023  7:45 AM  - Ammonia check 10/30  - Lactulose x 1 10/30  - Diagnostic paracentesis 10/30 to rule out new infection in setting of HE      # Moderate protein-energy malnutrition  - Nutrition consulted,  appreciate recs  - 2gm NA and very low fat diet  - GI wanted to continue very low fat diet, as do not expect TG to change quickly   - Likely plan to continue very low fat (LCT) diet for 2-3 weeks then liberalize fat restriction for LCT challenge and repeat TG on next paracentesis to evaluate if chylous ascites persists.     #Left arm fistula sight  pain and swelling  Swelling and tenderness on the left arm fistula site which was initially concerning for fistula site infection however imaging consistent with  hematoma.  - 10/23 Blood Cultures NGTD  - Ice / Heat to affected area  - Lidocaine topical to affected area  - Wean oxycodone 5mg BID prn      # Concern for multifocal pneumonia, organism unspecified  - CT scan showed pulmonary infiltrates   - COVID 19/influenza/RSV negative  - Unable to provide sputum specimen  - Abx as detailed above     # Recurrent pericardial effusion  # Paroxysmal atrial fibrillation   # NSTEMI  Troponin did peak at 346 on 10/05 (thought to be due to demand because of shock and EKG did not had any ST elevation). ECHO 10/10 with recurrent pericardial effusion slightly larger than seen on ECHO 10/8 s/p pericardiocentesis 10/11 with 560cc removed, s/p RHC 10/11 which showed normal right and left sided filling pressures, mild elevated pulmonary pressures and portal hypertension with hepatic vein pressure gradient of 10mmHg. Drain removed on 10/12. Repeat ECHO 10/17 with no recurrent pericardial effusion, LVEF >70%.  - If patient requires repeat pericardiocentesis in future then send for AFB/fungal culture, 16 S and 28 S  - Cardiology had signed off at previous hospital  - Continue PTA eliquis 5mg BID      # Pulmonary embolism diagnosed 10/5/2023  - Continue PTA eliquis 5mg BID     #Previous embolic stroke in 11/2022     # ESRD on HD MWF  # S/p left AV fistula pseudoaneurysm repair, 8/2023  # LUE AV fistula infiltrated on 10/6  # On kidney transplant list  - Dialysis per nephrology  - Continue renal multivitamin  - Continue PTA sevelamer 800mg QID     # Severe sepsis with septic shock - due to multifactorial pneumonia -resolved  # Chronic hypotension, on midodrine  - Is normally on midodrine for chronic hypotension, was adjusted multiple times during this hospitalization.     - Off pressors since 10/7/2023  - Random cortisol level checked on 10/13, which was normal     # Pain at multiple locations  Has pain in left upper extremity due to infiltration of AV fistula, pain in right upper extremity due  to thrombophlebitis and frequent lab draws.  - Tylenol 650mg Q8h PRN (keep daily < 2g) for pain  - Wean Oxycodone 5mg PO BID PRN for moderate pain     # Probable CHRISTIAN  Per patient's wife, he had a sleep study about 10 years ago but does not have any CPAP or BiPAP at home.  - CPAP ordered for sleep  - Should undergo sleep study as outpatient after discharge     # Anemia of chronic disease  Hgb baseline between 8-9  - Hemoglobin is stable on monitoring     # Chronic Thrombocytopenia  Likely due to liver disease.   - Continue to monitor     # Restless leg syndrome  -Continue PTA ropinirole     # Seizure disorder  - Continue PTA Vimpat     # GERD  -Continue PTA PPI        Diet: Combination Diet Renal Diet (dialysis); 2 gm NA Diet; Very Low Fat Diet (up to 10g)  Snacks/Supplements Adult: Ensure Clear; With Meals  Snacks/Supplements Adult: Gelatein Plus; Between Meals  Room Service  Fluid restriction 1200 ML FLUID    DVT Prophylaxis: DOAC  Nixon Catheter: Not present  Fluids: PO  Lines: PRESENT      Hemodialysis Vascular Access Right Subclavian-Site Assessment: WDL  Hemodialysis Vascular Access Arteriovenous fistula Left Forearm-Site Assessment: WDL      Cardiac Monitoring: None  Code Status: Full Code      Clinically Significant Risk Factors        # Hyperkalemia: Highest K = 6.1 mmol/L in last 2 days, will monitor as appropriate  # Hyponatremia: Lowest Na = 124 mmol/L in last 2 days, will monitor as appropriate   # Hypercalcemia: corrected calcium is >10.1, will monitor as appropriate    # Hypoalbuminemia: Lowest albumin = 2.8 g/dL at 10/22/2023  9:59 AM, will monitor as appropriate   # Thrombocytopenia: Lowest platelets = 128 in last 2 days, will monitor for bleeding   # Hypertension: Noted on problem list        # Overweight: Estimated body mass index is 26.16 kg/m  as calculated from the following:    Height as of 10/13/23: 1.829 m (6').    Weight as of this encounter: 87.5 kg (192 lb 14.4 oz).   # Moderate  Malnutrition: based on nutrition assessment    # Financial/Environmental Concerns: none         Disposition Plan      Expected Discharge Date: 10/31/2023      Destination: home;home with family  Discharge Comments: Dispo: Home with OP Dialysis  Plan: Sepsis - IV Abx; OP HD - [facility]  Progress: Plan to continue IV abx though 10/30 & para on Saturday        The patient's care was discussed with the Attending Physician, Dr. Aguirre .    Deejay Yoon MD  Medicine Service, [address]  Securely message with Vocera (more info)  Text page via [facility] Paging/Directory   See signed in provider for up to date coverage information  ______________________________________________________________________    Interval History     Nursing notes reviewed. No acute events overnight. This morning seen in dialysis with brother Dylan who joined by phone with questions mostly for the hepatology team regarding plans for follow-up and considering second opinion in near future. Did appear more confused today, slower processing and having more difficulty than prior formulating complex sentences.      Physical Exam   Vital Signs: Temp: 98  F (36.7  C) Temp src: Oral BP: 99/67 Pulse: 75   Resp: 10 SpO2: 98 % O2 Device: None (Room air)    Weight: 192 lbs 14.4 oz    General Appearance:  Alert, talkative, NAD. Some slower processing and difficulty with word finding.  Respiratory: CTAB. No wheezing or crackles.   Cardiovascular: RRR. Normal S1/S2.  GI: Soft, mildly distended, non-tender. No guarding or rebound.   Skin: Left fistula with hematoma.   Other:  Oriented x3.     Medical Decision Making       Please see A&P for additional details of medical decision making.      Data     I have personally reviewed the following data over the past 24 hrs:    5.5  \   7.8 (L)   / 128 (L)     124 (L) 89 (L) 79.9 (H) /  91   5.6 (H) 20 (L) 7.92 (H) \     ALT: <5 AST: 15 AP: 241 (H) TBILI: 0.3   ALB:  2.9 (L) TOT PROTEIN: 5.9 (L) LIPASE: N/A       Imaging results reviewed over the past 24 hrs:   No results found for this or any previous visit (from the past 24 hour(s)).

## 2023-10-30 NOTE — PROGRESS NOTES
HEMODIALYSIS TREATMENT NOTE    Date: 10/30/2023  Time: 11:15 AM    Data:  Pre Wt: 87.5 kg (192 lb 14.4 oz)   Desired Wt: 85  kg   Post Wt: 85 kg (187 lb 6.3 oz)  Weight change: 2.5 kg  Ultrafiltration - Post Run Net Total Removed (mL): 2500 mL  Vascular Access Status: Fistula  patent  Dialyzer Rinse: Streaked, Light  Total Blood Volume Processed: 79.96 L   Total Dialysis (Treatment) Time: 3.5   Dialysate Bath: K 2, Ca 2.5  Heparin: None    Lab:   Yes  Hep B  Interventions:  Pt dialyzed for 3.5 hrs via LUE AVF. Pt was hypotensive at the start of tx. Midodrine was given prior to HD and Mid tx with good effect. UF goal titrated accordingly and 2.5 L fluid removed. Epoetin administered with HD. AVF cannulated with 15g needles. Positive for thrill and bruit. 400-450 BFR achieved. Hemostasis obtained within 10 min. Handoff report given to PETER Goyal.    Assessment:  A&Ox4  Intermittently confused at times.  VSS except hypotensive treated with Midodrine  Calm and cooperative  AVF +T/B   Plan:    Next HD per renal

## 2023-10-30 NOTE — PLAN OF CARE
Goal Outcome Evaluation: 2300-0700      Plan of Care Reviewed With: patient    Overall Patient Progress: no changeOverall Patient Progress: no change    Outcome Evaluation: Refused CPAP MD aware. Slept well overnight. K 6.1 this AM MD paged EKG ordered Sinus rhythm. Plan for dialysis today. Pt also requesting care conference today. OXY given PRN for fistula pain with relief. Continue to monitor.     BP 94/58 (BP Location: Right arm)   Pulse 83   Temp 98  F (36.7  C) (Oral)   Resp 18   Wt 87.5 kg (192 lb 14.4 oz)   SpO2 96%   BMI 26.16 kg/m

## 2023-10-31 ENCOUNTER — APPOINTMENT (OUTPATIENT)
Dept: CT IMAGING | Facility: CLINIC | Age: 58
DRG: 280 | End: 2023-10-31
Attending: INTERNAL MEDICINE
Payer: COMMERCIAL

## 2023-10-31 LAB
ALBUMIN SERPL BCG-MCNC: 2.8 G/DL (ref 3.5–5.2)
ALP SERPL-CCNC: 235 U/L (ref 40–129)
ALT SERPL W P-5'-P-CCNC: <5 U/L (ref 0–70)
ANION GAP SERPL CALCULATED.3IONS-SCNC: 13 MMOL/L (ref 7–15)
AST SERPL W P-5'-P-CCNC: 16 U/L (ref 0–45)
BACTERIA FLD CULT: NORMAL
BILIRUB SERPL-MCNC: 0.3 MG/DL
BUN SERPL-MCNC: 47.4 MG/DL (ref 8–23)
CALCIUM SERPL-MCNC: 8.8 MG/DL (ref 8.6–10)
CHLORIDE SERPL-SCNC: 93 MMOL/L (ref 98–107)
CREAT SERPL-MCNC: 5.49 MG/DL (ref 0.67–1.17)
DEPRECATED HCO3 PLAS-SCNC: 23 MMOL/L (ref 22–29)
EGFRCR SERPLBLD CKD-EPI 2021: 11 ML/MIN/1.73M2
ERYTHROCYTE [DISTWIDTH] IN BLOOD BY AUTOMATED COUNT: 17 % (ref 10–15)
GLUCOSE SERPL-MCNC: 99 MG/DL (ref 70–99)
HCT VFR BLD AUTO: 22.5 % (ref 40–53)
HGB BLD-MCNC: 7.1 G/DL (ref 13.3–17.7)
MCH RBC QN AUTO: 31 PG (ref 26.5–33)
MCHC RBC AUTO-ENTMCNC: 31.6 G/DL (ref 31.5–36.5)
MCV RBC AUTO: 98 FL (ref 78–100)
PLATELET # BLD AUTO: 129 10E3/UL (ref 150–450)
POTASSIUM SERPL-SCNC: 4.6 MMOL/L (ref 3.4–5.3)
PROT SERPL-MCNC: 6 G/DL (ref 6.4–8.3)
RBC # BLD AUTO: 2.29 10E6/UL (ref 4.4–5.9)
SODIUM SERPL-SCNC: 129 MMOL/L (ref 135–145)
WBC # BLD AUTO: 5.6 10E3/UL (ref 4–11)

## 2023-10-31 PROCEDURE — 85027 COMPLETE CBC AUTOMATED: CPT | Performed by: STUDENT IN AN ORGANIZED HEALTH CARE EDUCATION/TRAINING PROGRAM

## 2023-10-31 PROCEDURE — 250N000012 HC RX MED GY IP 250 OP 636 PS 637: Performed by: HOSPITALIST

## 2023-10-31 PROCEDURE — 250N000013 HC RX MED GY IP 250 OP 250 PS 637

## 2023-10-31 PROCEDURE — 36592 COLLECT BLOOD FROM PICC: CPT | Performed by: STUDENT IN AN ORGANIZED HEALTH CARE EDUCATION/TRAINING PROGRAM

## 2023-10-31 PROCEDURE — 99233 SBSQ HOSP IP/OBS HIGH 50: CPT | Mod: GC | Performed by: INTERNAL MEDICINE

## 2023-10-31 PROCEDURE — 250N000013 HC RX MED GY IP 250 OP 250 PS 637: Performed by: HOSPITALIST

## 2023-10-31 PROCEDURE — 250N000013 HC RX MED GY IP 250 OP 250 PS 637: Performed by: STUDENT IN AN ORGANIZED HEALTH CARE EDUCATION/TRAINING PROGRAM

## 2023-10-31 PROCEDURE — 74176 CT ABD & PELVIS W/O CONTRAST: CPT | Mod: 26 | Performed by: RADIOLOGY

## 2023-10-31 PROCEDURE — 74176 CT ABD & PELVIS W/O CONTRAST: CPT

## 2023-10-31 PROCEDURE — 80053 COMPREHEN METABOLIC PANEL: CPT | Performed by: STUDENT IN AN ORGANIZED HEALTH CARE EDUCATION/TRAINING PROGRAM

## 2023-10-31 PROCEDURE — 250N000013 HC RX MED GY IP 250 OP 250 PS 637: Performed by: INTERNAL MEDICINE

## 2023-10-31 PROCEDURE — 120N000002 HC R&B MED SURG/OB UMMC

## 2023-10-31 RX ORDER — MIDODRINE HYDROCHLORIDE 5 MG/1
10-20 TABLET ORAL 4 TIMES DAILY PRN
Status: DISCONTINUED | OUTPATIENT
Start: 2023-10-31 | End: 2023-11-01 | Stop reason: HOSPADM

## 2023-10-31 RX ADMIN — HYDROXYZINE HYDROCHLORIDE 25 MG: 25 TABLET ORAL at 21:55

## 2023-10-31 RX ADMIN — TACROLIMUS 0.5 MG: 0.5 CAPSULE ORAL at 07:53

## 2023-10-31 RX ADMIN — LACOSAMIDE 100 MG: 50 TABLET, FILM COATED ORAL at 21:02

## 2023-10-31 RX ADMIN — MIDODRINE HYDROCHLORIDE 10 MG: 5 TABLET ORAL at 21:50

## 2023-10-31 RX ADMIN — TACROLIMUS 0.5 MG: 0.5 CAPSULE ORAL at 17:43

## 2023-10-31 RX ADMIN — SEVELAMER CARBONATE 800 MG: 800 TABLET, FILM COATED ORAL at 11:30

## 2023-10-31 RX ADMIN — HYDROXYZINE HYDROCHLORIDE 25 MG: 25 TABLET ORAL at 11:30

## 2023-10-31 RX ADMIN — LACTULOSE 20 G: 20 SOLUTION ORAL at 21:02

## 2023-10-31 RX ADMIN — SEVELAMER CARBONATE 800 MG: 800 TABLET, FILM COATED ORAL at 07:53

## 2023-10-31 RX ADMIN — GABAPENTIN 300 MG: 300 CAPSULE ORAL at 21:02

## 2023-10-31 RX ADMIN — APIXABAN 5 MG: 5 TABLET, FILM COATED ORAL at 07:53

## 2023-10-31 RX ADMIN — MIDODRINE HYDROCHLORIDE 10 MG: 5 TABLET ORAL at 09:36

## 2023-10-31 RX ADMIN — Medication 1 CAPSULE: at 07:53

## 2023-10-31 RX ADMIN — CIPROFLOXACIN 250 MG: 250 TABLET, FILM COATED ORAL at 13:25

## 2023-10-31 RX ADMIN — PANTOPRAZOLE SODIUM 40 MG: 40 TABLET, DELAYED RELEASE ORAL at 07:53

## 2023-10-31 RX ADMIN — LACOSAMIDE 50 MG: 50 TABLET, FILM COATED ORAL at 07:53

## 2023-10-31 RX ADMIN — HYDROXYZINE HYDROCHLORIDE 25 MG: 25 TABLET ORAL at 01:16

## 2023-10-31 RX ADMIN — SEVELAMER CARBONATE 800 MG: 800 TABLET, FILM COATED ORAL at 17:43

## 2023-10-31 RX ADMIN — LACTULOSE 20 G: 20 SOLUTION ORAL at 07:53

## 2023-10-31 RX ADMIN — APIXABAN 5 MG: 5 TABLET, FILM COATED ORAL at 21:02

## 2023-10-31 ASSESSMENT — ACTIVITIES OF DAILY LIVING (ADL)
ADLS_ACUITY_SCORE: 20

## 2023-10-31 NOTE — PROGRESS NOTES
[facility]    Progress Note - Medicine Service, [facility] TEAM 4       Date of Admission:  10/23/2023    Assessment & Plan   Blanco Osborne is a 58 year old male admitted on 10/23/2023. 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 2016 now with progressive cirrhosis (liver bx 12/2022) with evidence of portal hypertension and ascites, paroxysmal atrial fibrillation on Eliquis, DM type 2, history of CVA, hypertension, CHRISTIAN on BiPAP, chronic pericardial effusion, ESRD on dialysis who presented to [facility] ED on 10/5/23 with chest discomfort and palpitations, admitted with severe sepsis with septic shock requiring pressors secondary to multifactorial pneumonia, atrial fibrillation with aberrancy s/p 2 failed attempts at cardioversion, worsening pericardial effusion on ECHO s/p pericardiocentesis 10/11, and PE on apixaban. 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     Update today:  - Discussed case with ID/GI, plan for CT abdomen pelvis to examine for loculated ascites in setting of elevated PMN count  - Improvement in mentation, continue lactulose  - Transition abx to prophylactic ciprofloxacin     # SBP   # Chylous ascites   # Abdominal pain   Ascites likely related to portal HTN in the setting of cirrhosis, further c/b disruption of lymphatics (d/t increased lymph flow and portal HTN seen with cirrhosis) leading to chylous ascites as reflective of persistently elevated triglyceride. Has been on different antibiotics (see admission note).  Diagnostic and therapeutic paracentesis repeated multiple times during hospitalization with down-trending PMN count suggesting some improvement with antibiotics. PMNs on 10/28 are improved to 203.  - Hepatology consulted, appreciate recs  -  Transplant Infectious disease consulted, appreciate recs  - Cefepime 1g Q24h through 10/30 completed  - Ciprofloxacin 250mg daily indefinitely for prophylaxis        # Cirrhosis secondary to NAFLD s/p liver transplant 2016 now with recurrent cirrhosis with evidence of portal hypertension and ascites   #Immunosuppression  Patient follows with Dr. Simms, though notably has not been seen in years; last clinic visit 2020 with plan for 6 month follow up. Next appt 2023. Evidence of recurrent cirrhosis on liver biopsy 2022, he did have alcohol use post transplant and continues to have metabolic syndrome that can also contribute. Continues to have splenomegaly on recent CT.  No evidence of HCC.  - Per hepatology note, not a candidate for KT or SLK  MELD 3.0: 31 at 10/29/2023  7:45 AM  MELD-Na: 31 at 10/29/2023  7:45 AM  Calculated from:  Serum Creatinine: On dialysis. Using the maximum value.  Serum Sodium: 127 mmol/L at 10/29/2023  7:45 AM  Total Bilirubin: 0.4 mg/dL (Using min of 1 mg/dL) at 10/29/2023  7:45 AM  Serum Albumin: 3.1 g/dL at 10/29/2023  7:45 AM  INR(ratio): 1.67 at 10/27/2023  6:55 AM  Age at listin years  Sex: Male at 10/29/2023  7:45 AM  - Lactulose for 2-3 BM per day  - Diagnostic paracentesis 10/30 with rising PMN count, ID favors observation rather than extending treatment course      # Moderate protein-energy malnutrition  - Nutrition consulted,  appreciate recs  - 2gm NA and very low fat diet  - GI wanted to continue very low fat diet, as do not expect TG to change quickly   - Likely plan to continue very low fat (LCT) diet for 2-3 weeks then liberalize fat restriction for LCT challenge and repeat TG on next paracentesis to evaluate if chylous ascites persists.     #Left arm fistula sight  pain and swelling  Swelling and tenderness on the left arm fistula site which was initially concerning for fistula site infection however imaging consistent with hematoma.  - 10/23 Blood Cultures  NGTD  - Ice / Heat to affected area  - Lidocaine topical to affected area  - Wean oxycodone 5mg BID prn      # Pulmonary Infiltrates  CT scan showed pulmonary infiltrates, based on progression of symptoms low concern for infectious etiology.  - COVID 19/influenza/RSV negative  - Unable to provide sputum specimen     # Recurrent pericardial effusion  # Paroxysmal atrial fibrillation   # NSTEMI  Troponin did peak at 346 on 10/05 (thought to be due to demand because of shock and EKG did not had any ST elevation). ECHO 10/10 with recurrent pericardial effusion slightly larger than seen on ECHO 10/8 s/p pericardiocentesis 10/11 with 560cc removed, s/p RHC 10/11 which showed normal right and left sided filling pressures, mild elevated pulmonary pressures and portal hypertension with hepatic vein pressure gradient of 10mmHg. Drain removed on 10/12. Repeat ECHO 10/17 with no recurrent pericardial effusion, LVEF >70%.  - If patient requires repeat pericardiocentesis in future then send for AFB/fungal culture, 16 S and 28 S  - Cardiology had signed off at previous hospital  - Continue PTA eliquis 5mg BID      # Pulmonary embolism diagnosed 10/5/2023  - Continue PTA eliquis 5mg BID     #Previous embolic stroke in 11/2022     # ESRD on HD MWF  # S/p left AV fistula pseudoaneurysm repair, 8/2023  # LUE AV fistula infiltrated on 10/6  # On kidney transplant list  - Dialysis per nephrology  - Continue renal multivitamin  - Continue PTA sevelamer 800mg QID     # Severe sepsis with septic shock - due to multifactorial pneumonia -resolved  # Chronic hypotension, on midodrine  - Is normally on midodrine for chronic hypotension, was adjusted multiple times during this hospitalization.     - Off pressors since 10/7/2023  - Random cortisol level checked on 10/13, which was normal     # Pain at multiple locations  Has pain in left upper extremity due to infiltration of AV fistula, pain in right upper extremity due to thrombophlebitis and  frequent lab draws.  - Tylenol 650mg Q8h PRN (keep daily < 2g) for pain  - Wean Oxycodone 5mg PO BID PRN for moderate pain     # Probable CHRISTIAN  Per patient's wife, he had a sleep study about 10 years ago but does not have any CPAP or BiPAP at home.  - CPAP ordered for sleep  - Should undergo sleep study as outpatient after discharge     # Anemia of chronic disease  Hgb baseline between 8-9  - Hemoglobin is stable on monitoring     # Chronic Thrombocytopenia  Likely due to liver disease.   - Continue to monitor     # Restless leg syndrome  -Continue PTA ropinirole     # Seizure disorder  - Continue PTA Vimpat     # GERD  -Continue PTA PPI        Diet: Combination Diet Renal Diet (dialysis); 2 gm NA Diet; Very Low Fat Diet (up to 10g)  Snacks/Supplements Adult: Gelatein Plus; Between Meals  Room Service  Fluid restriction 1200 ML FLUID  Snacks/Supplements Adult: Ensure Clear; With Meals    DVT Prophylaxis: DOAC  Nixon Catheter: Not present  Fluids: PO  Lines: PRESENT      Hemodialysis Vascular Access Right Subclavian-Site Assessment: WDL  Hemodialysis Vascular Access Arteriovenous fistula Left Forearm-Site Assessment: Unable to visulaize (covered)      Cardiac Monitoring: None  Code Status: Full Code      Clinically Significant Risk Factors        # Hyperkalemia: Highest K = 6.1 mmol/L in last 2 days, will monitor as appropriate  # Hyponatremia: Lowest Na = 124 mmol/L in last 2 days, will monitor as appropriate      # Hypoalbuminemia: Lowest albumin = 2.8 g/dL at 10/31/2023  6:55 AM, will monitor as appropriate   # Thrombocytopenia: Lowest platelets = 128 in last 2 days, will monitor for bleeding   # Hypertension: Noted on problem list        # Overweight: Estimated body mass index is 25.98 kg/m  as calculated from the following:    Height as of 10/13/23: 1.829 m (6').    Weight as of this encounter: 86.9 kg (191 lb 9.3 oz).   # Moderate Malnutrition: based on nutrition assessment    # Financial/Environmental Concerns:  none         Disposition Plan      Expected Discharge Date: 11/01/2023      Destination: home;home with family  Discharge Comments: Dispo: Home with OP Dialysis  Plan: potential care conference  Progress: cefepime for SBP ppx;   Delay: encephalopathy?        The patient's care was discussed with the Attending Physician, Dr. Arriola .    Deejay Yoon MD  Medicine Service, [facility] TEAM [address] [place] [address]  Securely message with [employer] (more info)  Text page via [employer] Paging/Directory   See signed in provider for up to date coverage information  ______________________________________________________________________    Interval History     Nursing notes reviewed. No acute events overnight. This morning mentation markedly improved from yesterday. No reported chest pain, shortness of breath. Abdominal distention causing discomfort but no acute new pains.      Physical Exam   Vital Signs: Temp: 98  F (36.7  C) Temp src: Oral BP: 91/54 Pulse: 71   Resp: 18 SpO2: 97 % O2 Device: None (Room air)    Weight: 191 lbs 9.28 oz    General Appearance: Alert, no acute distress, conversant  Respiratory: CTAB, no wheezing / crackles  Cardiovascular: RRR, no MRG  GI: soft, distended, non-tender to palpation  Skin: no rashes or skin lesions identified    Medical Decision Making       Please see A&P for additional details of medical decision making.      Data     I have personally reviewed the following data over the past 24 hrs:    5.6  \   7.1 (L)   / 129 (L)     129 (L) 93 (L) 47.4 (H) /  99   4.6 23 5.49 (H) \     ALT: <5 AST: 16 AP: 235 (H) TBILI: 0.3   ALB: 2.8 (L) TOT PROTEIN: 6.0 (L) LIPASE: N/A       Imaging results reviewed over the past 24 hrs:   Recent Results (from the past 24 hour(s))   CT Abdomen Pelvis w/o Contrast    Narrative    EXAMINATION: CT ABDOMEN PELVIS W/O CONTRAST, 10/31/2023 2:36 PM    INDICATION: rising PMN count on paracentesis rule out loculated  ascites    COMPARISON  STUDY: CT chest abdomen pelvis 10/5/2023    TECHNIQUE: CT scan of the abdomen and pelvis was performed on  multidetector CT scanner using volumetric acquisition technique and  images were reconstructed in multiple planes with variable thickness  and reviewed on dedicated workstations.     CONTRAST: No contrast    CT scan radiation dose is optimized to minimum requisite dose using  automated dose modulation techniques.    FINDINGS:    Lower thorax: Right basilar rounded atelectasis. Right pleural  calcifications. Right basilar calcified granuloma.  Subsegmental  bibasilar atelectasis. Right greater than left small pleural  effusions. Cardiomegaly. Small pericardial effusion.    Liver: Postsurgical changes of liver transplant. Subcentimeter  hypodense lesion of the liver, likely hepatic cyst..    Biliary System: Gallbladder is surgically absent. Normal appearance of  the hepaticojejunostomy. No significant intrahepatic biliary  dilatation.    Pancreas: No mass or pancreatic ductal dilation.    Adrenal glands: No mass or nodules    Spleen: Splenomegaly measuring 17.7 cm in craniocaudal dimension.    Kidneys: Atrophic appearance of the kidneys. No hydronephrosis  bilaterally. Nonobstructive renal calculi. Nonspecific perirenal fat  stranding.    Gastrointestinal tract: Status post appendectomy. Normal caliber small  bowel and large bowel.    Mesentery/peritoneum/retroperitoneum: Moderate septated ascites,  similar to prior exam.    Lymph nodes: New enlarged right inguinal lymph node measuring 20 mm,  and 13 mm. Stable prominent mesenteric lymph nodes and associated hazy  appearance of the mesenteric fat.    Vasculature: Scattered atherosclerotic calcification of abdominal  aorta with no aneurysmal dilation.    Pelvis: The bladder is decompressed.  Prostate and seminal vesicles  are within normal limits.    Osseous structures: No aggressive or acute osseous lesion.  Stable  compression deformities of T10 and L1. Stable  sclerotic lesion of the  sacrum.      Soft tissues: Diffuse subcutaneous anasarca.      Impression    IMPRESSION:   1. Stable moderate septated ascites.  2. New enlarged right inguinal lymph nodes. These are indeterminate  and may represent reactive lymphadenopathy. Attention on follow-up.  3. Stable bibasilar atelectasis with right greater than left small  pleural effusions.  4. Small pericardial effusion.  5. Splenomegaly.    I have personally reviewed the examination and initial interpretation  and I agree with the findings.    CANELO SHULTZ MD         SYSTEM ID:  [id number]

## 2023-10-31 NOTE — PROGRESS NOTES
[facility]  Transplant Infectious Disease Progress Note     Patient:  Blanco Osborne, Date of birth 1965, Medical record number [id number]  Date of Visit:  10/31/2023         Assessment and Recommendations:   Recommendations:  - Agree with stopping cefepime as of 10/30 as planned and starting SBP prophylaxis with ciprofloxacin 250 mg po daily  - Encourage seasonal Covid vaccination about 2 weeks or later after cefepime completed.   - No specific ID followup is needed outpatient, as he has good transplant hepatology followup already scheduled with Dr. Alban Simms for 12/29/2023.   - Would not repeat diagnostic paracentesis for monitoring PMN count. Would only repeat diagnostic paracentesis if new concerning symptoms or clinical deterioration arise.  - No ID contraindication to discharge  - See discussion below for alternative recommendations if there is continued concern for peritonitis. We are comfortable giving him secondary prophylactic/suppressive ciprofloxacin as above and monitoring.    Transplant Infectious Disease will sign off. Please call if new questions or issues arise.     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     Infectious Disease issues include:    - Peritonitis. He presented to [facility] on 10/5/23 with chest discomfort and palpitations. Admitted with septic shock 2/2 multifocal pneumonia requiring pressors c/b A fib with aberrancy s/p 2 attempts at cardioversion which failed, worsening pericardial effusion s/p pericardiocentesis 10/11, and PE on heparin. 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 Has needed multiple paracenteses since  arrival. 10/6/2023 1119 WBC/ 1% neuts and trigs >400, 10/10/2023 761 WBC/ 4% neuts, 10/12/2023 719/1% neuts. SBP on 10/16/2023 paracentesis of chylous ascites: 10/16/2023 ascites with 3844 WBC/83% neuts, aerobic culture negative, fungal culture NGTD, AFB Cx NGTD/stain negative, 10/19 without diagnostics. On Cefepime since 10/17/2023. 10/24/2023 ascites fluid with 634 PMNs. Continue Cefepime 1g q24hrs (renally adjusted for HD) to complete a 14 day course of treatment for SBP (through 10/30). PMN down to 203 on 10/28, but repeated on 10/30 due to AMS and back up to 448. Unclear explanation for these changes in count, but would note he was still therapeutic on cefepime when repeat was done. In absence of other infectious symptoms, would not invoke cefepime failure, especially since the reason it was checked (AMS) resolved quickly with lactulose, and has multiple other etiologies (cefepime-neurotoxicity, oxycodone use in renal/liver failure, hepatic encephalopathy, etc). It is reasonable to switch to secondary prophylaxis and monitor clinically.     UPDATE 10/31 PM: Another CT A/P was obtained this afternoon. This shows stable (from 10/5) continued septated ascites. Given imaging stability over the past month, overall clinical improvement, and high risk of source control procedures in this patient with decompensated cirrhosis, feel comfortable calling his SBP treated after 14 days of cefepime.  Would note that if he had not had brief confusion (probably unrelated and already resolved) we would not have gone down this path of repeat paracentesis, CT A/P, etc.  If there is still clinical concern for uncontrolled infection, reasonable to keep him on broad spectrum antibiotic (cefepime), and perform large volume paracentesis on largest pocket for source control along with diagnostic paracentesis on each separate pocket to clarify which are infected, send for routine studies and cultures, and then consider stopping  antibiotics if cultures (bacterial, fungal, AFB) are negative.     - Pericardial effusion. S/p pericardiocentesis 10/11 with 2558 WBC/62% neutrophils, cultures negative. Pericardial drain removed 10/12. Quant gold negative, HIV screen negative, EBV DNA PCR <500/log <2.7, CMV DNA PCR not detected.  - Hx of multifocal pneumonia with septic shock. Hypotensive requiring pressor support and ICU admission 10/5. Did have A fib with 2 failed cardioversions, initiated on amiodarone with improvement to NSR. CT chest with PE and pulmonary infiltrates c/f pneumonia and is now s/p broad spectrum abx as above. No sputum collected or organism identified. Was negative for COVID/influenza/RSV on PCR. Completed course of Zosyn 10/5-12 + 2 days of Augmentin.  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 10/14/2023 check of CMV & EBV DNA neg.   - Immunization status: COVID vaccinated x2, influenza completed for 2023, due for seasonal COVID, shingrix #2 11/16/2   - Gamma globulin status: unknown  - Isolation status: Good hand hygiene.    Margaux Pickard MD. Pager [phone]         Interval History:   Since Blanco Osborne was last seen by Dr Pickard on 10/26/2023, he has overall improved. However, we are called back because he had some altered mental status yesterday prompting repeat paracentesis that showed rising PMN count. He received his last dose of 14 days of cefepime on 10/30 as well. His AMS is improved this morning. Of note, he receives oxycodone for chronic pain (L arm at fistula site) and also received some lactulose for HE.       Transplants:   9/20/2016 (Liver), Postoperative day:  2597.  Coordinator Tamara Khan           Current Medications & Allergies:       apixaban ANTICOAGULANT  5 mg Oral BID     ciprofloxacin  250 mg Oral Q24H BIJU     gabapentin  300 mg Oral At Bedtime     Lacosamide  100 mg Oral QPM     lacosamide  50 mg Oral QAM     lactulose  20 g Oral BID     multivitamin RENAL  1 capsule Oral Daily     pantoprazole  40 mg Oral QAM AC     sevelamer carbonate  800 mg Oral TID w/meals     tacrolimus  0.5 mg Oral BID IS       No Known Allergies         Physical Exam:     Patient Vitals for the past 24 hrs:   BP Temp Temp src Pulse Resp SpO2 Weight   10/31/23 0900 90/50 -- -- -- -- -- 86.9 kg (191 lb 9.3 oz)   10/31/23 0658 93/55 98.4  F (36.9  C) Oral 75 16 95 % --   10/30/23 2140 97/58 98.3  F (36.8  C) Oral 73 16 95 % --   10/30/23 2050 (!) 82/52 98.2  F (36.8  C) Oral 70 14 96 % --   10/30/23 1500 -- -- -- -- -- 95 % --   10/30/23 1440 93/57 -- -- -- -- -- --   10/30/23 1432 90/55 98.5  F (36.9  C) Oral 79 14 -- 85.7 kg (188 lb 14.4 oz)     Ranges for vital signs:  Temp:  [98.2  F (36.8  C)-98.5  F (36.9  C)] 98.4  F (36.9  C)  Pulse:  [70-79] 75  Resp:  [14-16] 16  BP: (82-97)/(50-58) 90/50  SpO2:  [95 %-96 %] 95 %  Vitals:    10/29/23 2055 10/30/23 1432 10/31/23 0900   Weight: 87.5 kg (192 lb 14.4 oz) 85.7 kg (188 lb 14.4 oz) 86.9 kg (191 lb 9.3 oz)       Physical Examination:  GENERAL:  well-developed, well-nourished man, resting in bed in no acute distress.  HEAD:  Head is normocephalic, atraumatic   EYES:  Eyes have anicteric sclerae   ENT:  Oropharynx is moist without exudates or ulcers. Tongue is midline  NECK:  Supple.   LUNGS:  Clear to auscultation bilateral.   CARDIOVASCULAR:  Regular rate and rhythm with + soft sys murmur  ABDOMEN:  Normal bowel sounds, soft, distended from ascites with mild tenderness throughout. Paracentesis sites look clean and dry.  SKIN:  No acute rashes. PIV Line in place without any  surrounding erythema or exudate. Tunneled dialysis line in place & not accessed. L forearm AV fistula with good thrill.   NEUROLOGIC:  Grossly nonfocal. Active x4 extremities         Laboratory Data:     Inflammatory Markers    Recent Labs   Lab Test 10/26/23  0457 10/24/23  0537 10/14/23  1042 01/19/23  0627 11/22/22  0025 03/01/16  0648   CRP  --   --   --   --  22.3*  --    CRPI 30.40* 31.40*  --    < >  --   --    ELPIDIO  --   --  11.7  --   --   --    PSA  --   --   --   --   --  0.02    < > = values in this interval not displayed.       Metabolic Studies       Recent Labs   Lab Test 10/31/23  0655 10/30/23  0735 10/30/23  0459 10/24/23  0537 10/23/23  0654 10/11/23  1746 10/11/23  1430 10/05/23  1611 10/05/23  1244 10/05/23  0248 10/05/23  0245 08/16/23  0607 08/10/23  1032 01/22/23  1147 01/22/23  0528   *  --  124*   < > 121*   < >  --    < >  --    < > 136   < > 141   < > 136   POTASSIUM 4.6 5.6* 6.1*   < > 5.5*   < >  --    < >  --    < > 3.8   < > 4.3   < > 3.8   CHLORIDE 93*  --  89*   < > 87*   < >  --    < >  --   --  90*   < > 95*   < > 94*   CO2 23  --  20*   < > 19*   < >  --    < >  --   --  24   < > 32*   < > 29   ANIONGAP 13  --  15   < > 15   < >  --    < >  --   --  22*   < > 14   < > 13   BUN 47.4*  --  79.9*   < > 87.9*   < >  --    < >  --   --  44.5*   < > 45.6*   < > 33.6*   CR 5.49*  --  7.92*   < > 8.03*   < >  --    < >  --   --  5.77*   < > 5.76*   < > 2.68*   GFRESTIMATED 11*  --  7*   < > 7*   < >  --    < >  --   --  11*   < > 11*   < > 27*   GLC 99  --  91   < > 107*   < >  --    < >  --   --  127*   < > 104*   < > 90   A1C  --   --   --   --   --   --   --   --   --   --   --   --  4.7  --   --    KELLY 8.8  --  8.8   < > 8.8   < >  --    < >  --   --  9.4   < > 9.5   < > 9.1   PHOS  --   --   --   --  3.1  --   --    < >  --   --  5.2*   < >  --    < >  --    MAG  --   --   --   --   --   --   --   --   --   --  2.1  --   --    < >  --    LACT  --   --   --   --   --   --  0.4   --  0.6*   < >  --   --   --    < >  --    PCAL  --   --   --   --   --   --   --   --   --   --   --   --   --   --  1.78*    < > = values in this interval not displayed.       Hepatic Studies    Recent Labs   Lab Test 10/31/23  0655 10/30/23  0459 10/29/23  0745 10/25/23  0440 10/24/23  0537 10/07/23  0431 10/06/23  0533 01/14/23  0615 01/13/23  0553   BILITOTAL 0.3 0.3 0.4   < > 0.4   < > 0.6   < >  --    DBIL  --   --   --   --   --   --  0.34*   < >  --    ALKPHOS 235* 241* 228*   < > 189*   < > 213*   < >  --    PROTTOTAL 6.0* 5.9* 6.3*   < > 6.0*   < > 6.6   < > 6.5   ALBUMIN 2.8* 2.9* 3.1*   < > 3.1*   < > 3.3*   < >  --    AST 16 15 18   < > 21   < > 12   < >  --    ALT <5 <5 <5   < > <5   < > 10   < >  --    LDH  --   --   --   --  172  --   --   --  217    < > = values in this interval not displayed.       Pancreatitis testing    Recent Labs   Lab Test 10/13/23  0850 10/31/17  1037 09/22/16  0741 09/20/16  1259   AMYLASE  --   --  72 86   LIPASE 24  --  63*  --    TRIG 65   < >  --   --     < > = values in this interval not displayed.       Hematology Studies   Recent Labs   Lab Test 10/31/23  0655 10/30/23  0459 10/29/23  0745 10/28/23  0726 10/24/23  0537 10/23/23  0654 10/05/23  0251 10/05/23  0245 11/22/22  0423 11/22/22  0025 11/16/22  0147 11/15/22  1822 11/12/22  1147 08/05/20  1230   WBC 5.6 5.5 7.1 5.4   < > 6.0   < > 9.9   < > 47.0*   < > 55.8*   < >  --    69652  --   --   --   --   --   --   --   --   --   --   --   --   --  2.8*   ANEU  --   --   --   --   --   --   --   --   --  39.0*  --  52.5*   < >  --    ANEUTAUTO  --   --   --   --   --  3.1  --  4.0   < >  --   --   --    < >  --    ALYM  --   --   --   --   --   --   --   --   --  3.3  --  1.7   < >  --    ALYMPAUTO  --   --   --   --   --  1.7  --  4.4   < >  --   --   --    < >  --    MARCELO  --   --   --   --   --   --   --   --   --  3.8*  --  0.0   < >  --    AMONOAUTO  --   --   --   --   --  0.8  --  1.1   < >  --   --   --    <  >  --    AEOS  --   --   --   --   --   --   --   --   --  0.0  --  0.0   < >  --    AEOSAUTO  --   --   --   --   --  0.3  --  0.3   < >  --   --   --    < >  --    ABSBASO  --   --   --   --   --  0.1  --  0.0   < >  --   --   --    < >  --    HGB 7.1* 7.8* 8.4* 9.1*   < > 9.0*   < > 11.4*   < > 10.5*   < > 11.5*   < >  --    67737  --   --   --   --   --   --   --   --   --   --   --   --   --  13.9*   HCT 22.5* 25.1* 26.4* 29.0*   < > 28.0*   < > 35.2*   < > 32.6*   < > 34.5*   < >  --    * 128* 152 168   < > 166   < > 146*   < > 229   < > 87*   < >  --    94329  --   --   --   --   --   --   --   --   --   --   --   --   --  103*    < > = values in this interval not displayed.       Clotting Studies    Recent Labs   Lab Test 10/27/23  0655 10/26/23  0525 10/17/23  0633 10/14/23  1042 10/05/23  1244 10/05/23  0245   INR 1.67* 1.70* 2.00*  --   --  1.55*   PTT  --   --   --  47*   < > 48*    < > = values in this interval not displayed.       Iron Testing    Recent Labs   Lab Test 10/31/23  0655 10/25/23  0440 10/24/23  0537 07/11/23  1057 04/23/23  0812 04/22/23  0821 04/14/23  0725 04/08/23  0859 04/06/23  0808 12/14/22  0412 12/13/22  0427   IRON  --   --  38*  --   --   --  60*  --  78   < > 37   FEB  --   --  190*  --   --   --  210*  --   --    < > 138*   IRONSAT  --   --  20  --   --   --  29  --   --    < > 27   HOSSEIN  --   --  423*  --   --   --  286  --   --    < > 212   MCV 98   < > 101*   < >  --   --  97   < >  --    < > 113*   FOLIC  --   --   --   --  >40.0*  --   --   --   --    < >  --    B12  --   --   --   --   --  989  --   --   --   --   --    RETP  --   --   --   --   --   --   --   --   --   --  6.5*   RETICABSCT  --   --   --   --   --   --   --   --   --   --  0.124*    < > = values in this interval not displayed.       Arterial Blood Gas Testing    Recent Labs   Lab Test 03/12/23  1117 03/10/23  1457 03/08/23  1640 03/07/23  1657 03/06/23  1253 03/05/23  2059 03/05/23  1602  12/20/22  0719 11/25/22  1947 11/23/22  1107 11/23/22  0442 11/22/22  1839   PH  --   --   --   --   --  7.30*  --  7.43  --  7.34* 7.47* 7.47*   PCO2  --   --   --   --   --  57*  --  40  --  43 32* 33*   PO2  --   --   --   --   --  94  --  118*  --  99 100 104   HCO3  --   --   --   --   --  28  --  27  --  23 23 24   O2PER 0 21 21 25   < > 30   < > 30   < > 30 40 40    < > = values in this interval not displayed.        Thyroid Studies     Recent Labs   Lab Test 03/25/23  1058 03/01/16  0648   TSH 4.89* 1.76   T4 0.92  --        Urine Studies     Recent Labs   Lab Test 11/25/22  1859 10/31/17  1038 09/24/16  1055 09/23/16  1458 03/01/16  0644   URINEPH 5.5 5.0 5.0 7.5* Duplicate request  SEE [id number]    5.0   NITRITE Negative Negative Negative Negative Duplicate request  SEE [id number]  *  Negative   LEUKEST Small* Negative Moderate* Large* Duplicate request  SEE [id number]  *  Negative   WBCU  --  1 13* 103* 2*   UWBCLM  --   --  Present*  --   --        CSF testing     Recent Labs   Lab Test 12/21/22  1526   CWBC 3   CRBC 58*   CGLU 69   CTP 71*       Medication levels    Recent Labs   Lab Test 10/30/23  0459 11/29/22  0439 11/16/22  0546   VANCOMYCIN  --   --  13.6   TACROL 3.8*   < >  --     < > = values in this interval not displayed.       Body fluid stats    Recent Labs   Lab Test 10/30/23  1526 10/28/23  1034 10/24/23  1059 03/06/23  1503 01/24/23  1615 01/13/23  1259 11/26/22  1123 09/20/16  1901   FCOL Yellow Orange* Orange*   < > Yellow Red*  Yellow   < >  --    FAPR Cloudy* Cloudy* Cloudy*   < > Turbid* Cloudy*  Turbid*   < >  --    FRBC  --   --   --   --   --  96  60,000  --   --    FWBC 1,179 737 1,035   < > 458 7,918  180   < >  --    FNEU 38 28 61   < > 1 93  16   < >  --    FLYM 47 34 20   < > 61 7  42   < >  --    FMONO 15  --   --    < > 36 40   < >  --    FBAS  --   --   --   --  1  --   --   --    FALB 1.4 1.4 1.1   < > 0.6 0.5   < >  --    FTP 2.6 2.5 2.4  2.3   < > 1.8 3.1  1.4   < >  --     GS  --   --   --   --   --   --   --  No organisms seen  No PMNs seen      < > = values in this interval not displayed.       Microbiology:  Last Culture results   Culture   Date Value Ref Range Status   10/30/2023 No growth, less than 1 day  Preliminary   10/28/2023 No growth after 2 days  Preliminary   10/24/2023 No Growth  Final   10/24/2023 No anaerobic organisms isolated  Final   10/24/2023 No Growth  Final   10/24/2023 No growth after 6 days  Preliminary   10/23/2023 No Growth  Final   10/23/2023 No Growth  Final   10/16/2023 No Growth  Final   10/16/2023 No growth after 14 days  Preliminary   10/12/2023 No Growth  Final   10/11/2023 No Growth  Final   10/10/2023 No Growth  Final   10/06/2023 No Growth  Final   10/06/2023 No anaerobic organisms isolated  Final   10/06/2023 No growth after 24 days  Preliminary   10/05/2023 No Growth  Final   10/05/2023 No Growth  Final   09/29/2023 No Growth  Final   03/06/2023 No Growth  Final     Culture Micro   Date Value Ref Range Status   09/28/2016 No VRE isolated  Final   09/23/2016 No growth  Final   09/20/2016   Final    Culture negative for acid fast bacilli  Assayed at [facility] [address]     09/20/2016 No anaerobes isolated  Final   09/20/2016 No growth  Final   09/20/2016 Culture negative after 4 weeks  Final           Last checks of Clostridioides difficile testing  Recent Labs   Lab Test 10/10/23  1711 01/23/23  1656   CDBPCT Negative Negative       Virology:  Coronavirus-19 testing    Recent Labs   Lab Test 10/17/23  2010 10/05/23  0727 01/24/23  1729 11/19/22  1546   [id number] Negative Negative Negative Negative       Respiratory virus (non-coronavirus-19) testing    Recent Labs   Lab Test 10/14/23  1808 10/05/23  0727   IFLUA Not Detected  --    INFZA  --  Negative   FLUAH1 Not Detected  --    [id number] Not Detected  --    FLUAH3 Not Detected  --    IFLUB Not Detected  --    INFZB  --  Negative   PIV1 Not Detected  --    PIV2 Not  Detected  --    PIV3 Not Detected  --    PIV4 Not Detected  --    IRSV  --  Negative   RSVA Not Detected  --    RSVB Not Detected  --    HMPV Not Detected  --    RHINEV Not Detected  --    ADENOV Not Detected  --    CORONA Not Detected  --        EBV DNA Copies/mL   Date Value Ref Range Status   10/14/2023 <500 (A) Not Detected copies/mL Final     Comment:     EBV DNA Detected below the reportable range of 500 copies/mL       Imaging:  Recent Results (from the past 48 hour(s))   POC US GUIDE FOR PARACENTESIS    Impression    US Indication: decompensated liver disease    FINDINGS:  Limited abdominal ultrasound was performed and demonstrated an adequate fluid collection on the LLQ of the abdomen. Doppler of the skin demonstrated an area at this site without significant vasculature. A paracentesis at this site was subsequently performed.

## 2023-10-31 NOTE — PLAN OF CARE
Goal Outcome Evaluation:  Admitted for sepsis. Abx finished yesterday. PO abx for SBP prophylaxis. Pt denies pain and nausea. Administered atarax for itching. 1.2L FR, ensures do not count towards FR. A&Ox4. 2 BM today. Abd/pelvis CT completed.

## 2023-10-31 NOTE — PLAN OF CARE
Goal Outcome Evaluation:  1900-0730 VSS on RA ex hypotensive, PRN midodrine 20mg given for BP 82/52, recheck SBP >90.  Refusing CPAP still, does not wear it at home either, MD was paged.  A&Ox4, very anxious this shift & talking about dying. Empathetic listening & encouragement provided, pt may have more questions regarding care today.  Denies pain.  PRN atarax given for itching.  R PIV SL.  L fistula drsg CDI.  Up ad kaelyn.  Reports BM x2 10/30.  Continue to monitor & follow POC.

## 2023-11-01 ENCOUNTER — HOSPITAL ENCOUNTER (OUTPATIENT)
Facility: CLINIC | Age: 58
End: 2023-11-01
Payer: COMMERCIAL

## 2023-11-01 VITALS
SYSTOLIC BLOOD PRESSURE: 91 MMHG | WEIGHT: 191.58 LBS | TEMPERATURE: 98.5 F | DIASTOLIC BLOOD PRESSURE: 49 MMHG | BODY MASS INDEX: 25.98 KG/M2 | OXYGEN SATURATION: 99 % | RESPIRATION RATE: 18 BRPM | HEART RATE: 67 BPM

## 2023-11-01 LAB
ALBUMIN SERPL BCG-MCNC: 2.8 G/DL (ref 3.5–5.2)
ALP SERPL-CCNC: 222 U/L (ref 40–129)
ALT SERPL W P-5'-P-CCNC: <5 U/L (ref 0–70)
ANION GAP SERPL CALCULATED.3IONS-SCNC: 15 MMOL/L (ref 7–15)
AST SERPL W P-5'-P-CCNC: 15 U/L (ref 0–45)
BILIRUB SERPL-MCNC: 0.3 MG/DL
BUN SERPL-MCNC: 67.1 MG/DL (ref 8–23)
CALCIUM SERPL-MCNC: 8.9 MG/DL (ref 8.6–10)
CHLORIDE SERPL-SCNC: 93 MMOL/L (ref 98–107)
CREAT SERPL-MCNC: 7.05 MG/DL (ref 0.67–1.17)
DEPRECATED HCO3 PLAS-SCNC: 21 MMOL/L (ref 22–29)
EGFRCR SERPLBLD CKD-EPI 2021: 8 ML/MIN/1.73M2
ERYTHROCYTE [DISTWIDTH] IN BLOOD BY AUTOMATED COUNT: 16.3 % (ref 10–15)
GLUCOSE SERPL-MCNC: 94 MG/DL (ref 70–99)
HCT VFR BLD AUTO: 23.4 % (ref 40–53)
HGB BLD-MCNC: 7.3 G/DL (ref 13.3–17.7)
MCH RBC QN AUTO: 31.1 PG (ref 26.5–33)
MCHC RBC AUTO-ENTMCNC: 31.2 G/DL (ref 31.5–36.5)
MCV RBC AUTO: 100 FL (ref 78–100)
PATH REPORT.COMMENTS IMP SPEC: ABNORMAL
PATH REPORT.COMMENTS IMP SPEC: ABNORMAL
PATH REPORT.COMMENTS IMP SPEC: YES
PATH REPORT.FINAL DX SPEC: ABNORMAL
PATH REPORT.GROSS SPEC: ABNORMAL
PATH REPORT.MICROSCOPIC SPEC OTHER STN: ABNORMAL
PATH REPORT.RELEVANT HX SPEC: ABNORMAL
PHOSPHATE SERPL-MCNC: 4 MG/DL (ref 2.5–4.5)
PLATELET # BLD AUTO: 126 10E3/UL (ref 150–450)
POTASSIUM SERPL-SCNC: 4.4 MMOL/L (ref 3.4–5.3)
PROT SERPL-MCNC: 5.9 G/DL (ref 6.4–8.3)
RBC # BLD AUTO: 2.35 10E6/UL (ref 4.4–5.9)
SODIUM SERPL-SCNC: 129 MMOL/L (ref 135–145)
WBC # BLD AUTO: 5.6 10E3/UL (ref 4–11)

## 2023-11-01 PROCEDURE — 99239 HOSP IP/OBS DSCHRG MGMT >30: CPT | Performed by: INTERNAL MEDICINE

## 2023-11-01 PROCEDURE — 250N000013 HC RX MED GY IP 250 OP 250 PS 637: Performed by: STUDENT IN AN ORGANIZED HEALTH CARE EDUCATION/TRAINING PROGRAM

## 2023-11-01 PROCEDURE — 258N000003 HC RX IP 258 OP 636: Performed by: INTERNAL MEDICINE

## 2023-11-01 PROCEDURE — 80053 COMPREHEN METABOLIC PANEL: CPT | Performed by: STUDENT IN AN ORGANIZED HEALTH CARE EDUCATION/TRAINING PROGRAM

## 2023-11-01 PROCEDURE — 250N000013 HC RX MED GY IP 250 OP 250 PS 637: Performed by: HOSPITALIST

## 2023-11-01 PROCEDURE — 99232 SBSQ HOSP IP/OBS MODERATE 35: CPT | Performed by: NURSE PRACTITIONER

## 2023-11-01 PROCEDURE — 250N000012 HC RX MED GY IP 250 OP 636 PS 637: Performed by: HOSPITALIST

## 2023-11-01 PROCEDURE — 250N000013 HC RX MED GY IP 250 OP 250 PS 637: Performed by: INTERNAL MEDICINE

## 2023-11-01 PROCEDURE — 99233 SBSQ HOSP IP/OBS HIGH 50: CPT | Performed by: PHYSICIAN ASSISTANT

## 2023-11-01 PROCEDURE — 250N000013 HC RX MED GY IP 250 OP 250 PS 637

## 2023-11-01 PROCEDURE — 84100 ASSAY OF PHOSPHORUS: CPT | Performed by: INTERNAL MEDICINE

## 2023-11-01 PROCEDURE — 634N000001 HC RX 634: Mod: JZ | Performed by: INTERNAL MEDICINE

## 2023-11-01 PROCEDURE — 85027 COMPLETE CBC AUTOMATED: CPT | Performed by: STUDENT IN AN ORGANIZED HEALTH CARE EDUCATION/TRAINING PROGRAM

## 2023-11-01 PROCEDURE — 36415 COLL VENOUS BLD VENIPUNCTURE: CPT | Performed by: STUDENT IN AN ORGANIZED HEALTH CARE EDUCATION/TRAINING PROGRAM

## 2023-11-01 PROCEDURE — 90935 HEMODIALYSIS ONE EVALUATION: CPT

## 2023-11-01 PROCEDURE — 250N000009 HC RX 250: Performed by: INTERNAL MEDICINE

## 2023-11-01 RX ORDER — TACROLIMUS 0.5 MG/1
0.5 CAPSULE ORAL 2 TIMES DAILY
Qty: 180 CAPSULE | Refills: 0 | Status: SHIPPED | OUTPATIENT
Start: 2023-11-01 | End: 2024-01-30

## 2023-11-01 RX ORDER — LACTULOSE 10 G/15ML
20 SOLUTION ORAL 2 TIMES DAILY
Qty: 3600 ML | Refills: 0 | Status: SHIPPED | OUTPATIENT
Start: 2023-11-01 | End: 2024-01-01

## 2023-11-01 RX ORDER — GABAPENTIN 100 MG/1
300 CAPSULE ORAL AT BEDTIME
Qty: 30 CAPSULE | Refills: 2 | Status: SHIPPED | OUTPATIENT
Start: 2023-11-01 | End: 2024-05-09

## 2023-11-01 RX ADMIN — SEVELAMER CARBONATE 800 MG: 800 TABLET, FILM COATED ORAL at 07:26

## 2023-11-01 RX ADMIN — TACROLIMUS 0.5 MG: 0.5 CAPSULE ORAL at 07:27

## 2023-11-01 RX ADMIN — Medication: at 08:12

## 2023-11-01 RX ADMIN — EPOETIN ALFA-EPBX 8000 UNITS: 10000 INJECTION, SOLUTION INTRAVENOUS; SUBCUTANEOUS at 10:32

## 2023-11-01 RX ADMIN — SEVELAMER CARBONATE 800 MG: 800 TABLET, FILM COATED ORAL at 13:41

## 2023-11-01 RX ADMIN — PANTOPRAZOLE SODIUM 40 MG: 40 TABLET, DELAYED RELEASE ORAL at 07:27

## 2023-11-01 RX ADMIN — LACTULOSE 20 G: 20 SOLUTION ORAL at 07:26

## 2023-11-01 RX ADMIN — LIDOCAINE HYDROCHLORIDE 0.5 ML: 10 INJECTION, SOLUTION EPIDURAL; INFILTRATION; INTRACAUDAL; PERINEURAL at 08:12

## 2023-11-01 RX ADMIN — MIDODRINE HYDROCHLORIDE 20 MG: 5 TABLET ORAL at 04:14

## 2023-11-01 RX ADMIN — CIPROFLOXACIN 250 MG: 250 TABLET, FILM COATED ORAL at 13:41

## 2023-11-01 RX ADMIN — APIXABAN 5 MG: 5 TABLET, FILM COATED ORAL at 07:27

## 2023-11-01 RX ADMIN — ACETAMINOPHEN 650 MG: 325 TABLET, FILM COATED ORAL at 00:34

## 2023-11-01 RX ADMIN — MIDODRINE HYDROCHLORIDE 10 MG: 5 TABLET ORAL at 07:26

## 2023-11-01 RX ADMIN — Medication 1 CAPSULE: at 07:26

## 2023-11-01 RX ADMIN — SODIUM CHLORIDE 300 ML: 9 INJECTION, SOLUTION INTRAVENOUS at 08:12

## 2023-11-01 RX ADMIN — MIDODRINE HYDROCHLORIDE 10 MG: 5 TABLET ORAL at 10:51

## 2023-11-01 RX ADMIN — SODIUM CHLORIDE 250 ML: 9 INJECTION, SOLUTION INTRAVENOUS at 08:12

## 2023-11-01 RX ADMIN — OXYCODONE HYDROCHLORIDE 2.5 MG: 5 TABLET ORAL at 07:31

## 2023-11-01 RX ADMIN — MIDODRINE HYDROCHLORIDE 10 MG: 5 TABLET ORAL at 13:46

## 2023-11-01 RX ADMIN — MIDODRINE HYDROCHLORIDE 10 MG: 5 TABLET ORAL at 09:32

## 2023-11-01 RX ADMIN — HYDROXYZINE HYDROCHLORIDE 25 MG: 25 TABLET ORAL at 13:41

## 2023-11-01 RX ADMIN — LACOSAMIDE 50 MG: 50 TABLET, FILM COATED ORAL at 07:27

## 2023-11-01 RX ADMIN — MIDODRINE HYDROCHLORIDE 10 MG: 5 TABLET ORAL at 08:37

## 2023-11-01 ASSESSMENT — ACTIVITIES OF DAILY LIVING (ADL)
ADLS_ACUITY_SCORE: 20

## 2023-11-01 NOTE — DISCHARGE SUMMARY
[facility]  Discharge Summary - Medicine & Pediatrics       Date of Admission:  10/23/2023  Date of Discharge:  11/1/2023  4:57 PM  Discharging Provider: Dr. 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 s/p liver transplant 2016 now with recurrent cirrhosis with evidence of portal hypertension and ascites   #Immunosuppression  # Moderate protein-energy malnutrition   # L arm hematoma   # Pulmonary Infiltrates   # Recurrent pericardial effusion  # Paroxysmal atrial fibrillation   # NSTEMI  # Pulmonary embolism diagnosed 10/5/2023   #Previous embolic stroke in 11/2022   # ESRD on HD MWF  # S/p left AV fistula pseudoaneurysm repair, 8/2023  # LUE AV fistula infiltrated on 10/6  # Severe sepsis with septic shock - due to multifactorial pneumonia -resolved  # Chronic hypotension, on midodrine  # Probable CHRISTIAN   # Anemia of chronic disease   # Chronic Thrombocytopenia   # Restless leg syndrome   # Seizure disorder   # GERD       Follow-ups Needed After Discharge   Follow-up Appointments     Adult [facility] Follow-up and recommended labs and tests      1. You will be called by your transplant coordinator to arrange   outpatient lab checks  2. You will be called to schedule outpatient paracentesis as needed for   abdominal distension/ascites fluid build up  3. You are scheduled for Liver team f/up w/ Dr. Simms 12/29  4. Resume outpatient dialysis schedule    Appointments on [place] and/or [facility] (with [facility] or [facility]   provider or service). Call [phone] if you haven't heard regarding   these appointments within 7 days of discharge.        Adult [facility] Follow-up and recommended labs and tests      Follow up with  Cardiac Rehab 11/2/2023 at 9:30 AM   Dialysis as scheduled with your outpatient nephrology team.  Gastroenterology - Hepatology  with Dr. Simms 12/29/23 at 9:45 AM.  Neurology 3/5/2024 with Dr. Reba Laughlin at 11:00 AM.      Appointments on [place] and/or [facility] (with [facility] or [facility]   provider or service). Call [phone] if you haven't heard regarding   these appointments within 7 days of discharge.            Unresulted Labs Ordered in the Past 30 Days of this Admission       Date and Time Order Name Status Description    10/30/2023 12:52 PM Ascites Fluid Aerobic Bacterial Culture Routine Preliminary     10/28/2023 12:01 AM Ascites Fluid Aerobic Bacterial Culture Routine with Gram Stain Preliminary     10/24/2023  8:22 AM Fungal or Yeast Culture Routine Preliminary     10/16/2023 12:57 PM Fungal or Yeast Culture Routine Preliminary     10/16/2023 12:57 PM Acid-Fast Bacilli Culture and Stain In process     10/6/2023  8:46 PM Fungal or Yeast Culture Routine Preliminary         These results will be followed up by primary team/Liver Transplant team.    Discharge Disposition   Discharged to home  Condition at discharge: Stable    Hospital Course   # SBP   # Chylous ascites   # Abdominal pain   Ascites likely related to portal HTN in the setting of cirrhosis, further c/b disruption of lymphatics (d/t increased lymph flow and portal HTN seen with cirrhosis) leading to chylous ascites as reflective of persistently elevated triglyceride. Due to elevated PMN count, treated for SBP with assistance from transplant infectious disease team and completed 2 week course of cefepime. Repeated diagnostic and therapeutic paracenteses during hospitalization demonstrated down-trending PMN count and triglyceride count with antibiotic treatment and low fat diet. Due to interval development of confusion that responded to baseline with re-introduction of lactulose, repeat diagnostic paracentesis performed prior to discharge which demonstrated mild increase in PMN count. Discussed results with transplant ID team and hepatology team - possibly  elevated in the setting of septated (stable) ascites and deferred further treatement dosed antibiotics. Placed on prophylactic antibiotics for SBP and arranged with paracenteses to continue in outpatient setting.  - 2 week course of cefepime completed 10/30  - Prophylactic ciprofloxacin 250mg daily (renally dosed)  - Hepatology follow up on  with Dr. Simms  - Paracentesis ordered for outpatient  - No need for transplant ID follow-up  - Encouraged connection with transplant coordinator for connecting with hepatology team        # Cirrhosis secondary to NAFLD s/p liver transplant 2016 now with recurrent cirrhosis with evidence of portal hypertension and ascites   #Immunosuppression  Patient follows with Dr. Simms, though notably has not been seen in years; last clinic visit 2020 with plan for 6 month follow up. Next appt 2023. Evidence of recurrent cirrhosis on liver biopsy 2022, he did have alcohol use post transplant and continues to have metabolic syndrome that can also contribute. Continues to have splenomegaly on recent CT.  No evidence of HCC.  - Per hepatology note, not a candidate for KT or SLK  MELD 3.0: 31 at 10/29/2023  7:45 AM  MELD-Na: 31 at 10/29/2023  7:45 AM  Calculated from:  Serum Creatinine: On dialysis. Using the maximum value.  Serum Sodium: 127 mmol/L at 10/29/2023  7:45 AM  Total Bilirubin: 0.4 mg/dL (Using min of 1 mg/dL) at 10/29/2023  7:45 AM  Serum Albumin: 3.1 g/dL at 10/29/2023  7:45 AM  INR(ratio): 1.67 at 10/27/2023  6:55 AM  Age at listin years  Sex: Male at 10/29/2023  7:45 AM  - Lactulose for 2-3 BM per day prescribed at discharge as needed  - Hepatology follow-up       # Moderate protein-energy malnutrition  - Discussed diet with patient multiple times during hospitalization including maintaining a high protein and low fat diet     #LUE Hematoma  Swelling and tenderness on the left arm fistula site which was initially concerning for fistula site infection  however imaging consistent with hematoma.  - Recommended treatment with tylenol, Ice/Heat to area     # Pulmonary Infiltrates  CT scan showed pulmonary infiltrates, based on progression of symptoms low concern for infectious etiology.  - Recommend outpatient follow up and consider repeat imaging with PCP to ensure resolution     # Recurrent pericardial effusion  # Paroxysmal atrial fibrillation   # NSTEMI  Troponin did peak at 346 on 10/05 (thought to be due to demand because of shock and EKG did not had any ST elevation). ECHO 10/10 with recurrent pericardial effusion slightly larger than seen on ECHO 10/8 s/p pericardiocentesis 10/11 with 560cc removed, s/p RHC 10/11 which showed normal right and left sided filling pressures, mild elevated pulmonary pressures and portal hypertension with hepatic vein pressure gradient of 10mmHg. Pericardial drain removed on 10/12. Repeat ECHO 10/17 with no recurrent pericardial effusion, LVEF >70%.  - If patient requires repeat pericardiocentesis in future then send for AFB/fungal culture, 16 S and 28 S  - Cardiac rehab follow-up scheduled 11/2  - Continue PTA eliquis 5mg BID      # Previous embolic stroke in 11/2022  # Pulmonary embolism diagnosed 10/5/2023  - Continue PTA eliquis 5mg BID     # ESRD on HD MWF  # S/p left AV fistula pseudoaneurysm repair, 8/2023  # LUE AV fistula infiltrated on 10/6  - Dialysis per nephrology continue outpatient  - Continue renal multivitamin  - Continue PTA sevelamer 800mg QID     # Severe sepsis with septic shock - due to multifactorial pneumonia -resolved  # Chronic hypotension, on midodrine  - Continue PTA midodrine 10mg TID and 10-20 QID PRN 1 hour prior to HD, at HD, during HD, and after HD     # Probable CHRISTIAN  Per patient's wife, he had a sleep study about 10 years ago but does not have any CPAP or BiPAP at home.  - Recommend outpatient follow-up with PCP to consider sleep study     # Anemia of chronic disease  Hgb baseline between 8-9     #  Chronic Thrombocytopenia  Likely due to liver disease.   - Routine outpatient monitoring     # Restless leg syndrome  -Continue PTA ropinirole     # Seizure disorder  - Continue PTA Vimpat     # GERD  -Continue PTA PPI    Consultations This Hospital Stay   NEPHROLOGY ESRD ADULT IP CONSULT  NUTRITION SERVICES ADULT IP CONSULT  GI HEPATOLOGY ADULT IP CONSULT  INFECTIOUS DISEASE TRANSPLANT SOT ADULT IP CONSULT  PHYSICAL THERAPY ADULT IP CONSULT  OCCUPATIONAL THERAPY ADULT IP CONSULT  INTERNAL MEDICINE PROCEDURE TEAM ADULT IP CONSULT [facility]  CARE MANAGEMENT / SOCIAL WORK IP CONSULT  NURSING TO CONSULT FOR VASCULAR ACCESS CARE IP CONSULT  INTERNAL MEDICINE PROCEDURE TEAM ADULT IP CONSULT [facility]  INTERNAL MEDICINE PROCEDURE TEAM ADULT IP CONSULT [facility]    Code Status   Full Code       The patient was discussed with Dr. Flako Yoon MD  [address] MED SURG  [address]  [place] [address]  Phone: [phone]  ______________________________________________________________________    Physical Exam   Vital Signs: Temp: 98.5  F (36.9  C) Temp src: Oral BP: 91/49 Pulse: 67   Resp: 18 SpO2: 99 % O2 Device: None (Room air)    Weight: 191 lbs 9.28 oz  General Appearance: Appears comfortable, no acute distress  Respiratory: CTAB, no wheezes / crackles  Cardiovascular: RRR, no MRG  GI: soft, mildly distended, non-tender to palpation  Skin: faint jaundice, no visible skin rashes      Primary Care Physician   Ki Carter    Discharge Orders      US Paracentesis with Albumin     Cardiac Rehab Referral      Reason for your hospital stay    You were hospitalized because you were having recurrent fluid building up in your abdomen that was concerning for infection. Part of this fluid collection was also due to high triglycerides which we addressed by keeping you on a low fat diet. We also treated you with 2 weeks of antibiotics to  treat the infected fluid in your abdomen. We had the liver team and the transplant infectious disease team follow you during your hospital stay. After leaving the hospital, we recommend you continue with an antibiotic ciprofloxacin daily to prevent future infections. We also recommend that you continue to get fluid removed from your abdomen on a scheduled basis - this was arranged by the liver team. You will continue to follow with the liver team once you leave the hospital.     Activity    Your activity upon discharge: activity as tolerated     Adult [facility] Follow-up and recommended labs and tests    1. You will be called by your transplant coordinator to arrange outpatient lab checks  2. You will be called to schedule outpatient paracentesis as needed for abdominal distension/ascites fluid build up  3. You are scheduled for Liver team f/up w/ Dr. Simms 12/29  4. Resume outpatient dialysis schedule    Appointments on [place] and/or [facility] (with [facility] or [facility] provider or service). Call [phone] if you haven't heard regarding these appointments within 7 days of discharge.     Adult [facility] Follow-up and recommended labs and tests    Follow up with  Cardiac Rehab 11/2/2023 at 9:30 AM   Dialysis as scheduled with your outpatient nephrology team.  Gastroenterology - Hepatology with Dr. Simms 12/29/23 at 9:45 AM.  Neurology 3/5/2024 with Dr. Reba Laughlin at 11:00 AM.      Appointments on [place] and/or [facility] (with [facility] or [facility] provider or service). Call [phone] if you haven't heard regarding these appointments within 7 days of discharge.     Diet    Follow this diet upon discharge:           Fluid restriction 1200 ML FLUID            2 gm NA Diet; Very Low Fat Diet (up to 10g)       Significant Results and Procedures   Most Recent 3 CBC's:  Recent Labs   Lab Test 11/01/23  0634 10/31/23  0655 10/30/23  0459   WBC 5.6 5.6 5.5   HGB 7.3* 7.1* 7.8*    98 101*   * 129*  128*     Most Recent 3 BMP's:  Recent Labs   Lab Test 11/01/23  0634 10/31/23  0655 10/30/23  0735 10/30/23  0459   * 129*  --  124*   POTASSIUM 4.4 4.6 5.6* 6.1*   CHLORIDE 93* 93*  --  89*   CO2 21* 23  --  20*   BUN 67.1* 47.4*  --  79.9*   CR 7.05* 5.49*  --  7.92*   ANIONGAP 15 13  --  15   KELLY 8.9 8.8  --  8.8   GLC 94 99  --  91     Most Recent 2 LFT's:  Recent Labs   Lab Test 11/01/23  0634 10/31/23  0655   AST 15 16   ALT <5 <5   ALKPHOS 222* 235*   BILITOTAL 0.3 0.3     Most Recent 3 INR's:  Recent Labs   Lab Test 10/27/23  0655 10/26/23  0525 10/17/23  0633   INR 1.67* 1.70* 2.00*       Discharge Medications   Discharge Medication List as of 11/1/2023  4:07 PM        START taking these medications    Details   ciprofloxacin (CIPRO) 250 MG tablet Take 1 tablet (250 mg) by mouth every 24 hours for 90 days, Disp-90 tablet, R-0, E-Prescribe      lactulose (CHRONULAC) 10 GM/15ML solution Take 30 mLs (20 g) by mouth 2 times daily for 60 days, Disp-3600 mL, R-0, E-PrescribeGoal to have 2-3 bowel movements per day - can hold dose if having >2-3 bowel movements.      pantoprazole (PROTONIX) 40 MG EC tablet Take 1 tablet (40 mg) by mouth every morning (before breakfast) for 90 days, Disp-90 tablet, R-0, E-Prescribe           CONTINUE these medications which have CHANGED    Details   acetaminophen (TYLENOL) 325 MG tablet Take 2 tablets (650 mg) by mouth every 8 hours as needed for other (For optimal non-opioid multimodal pain management to improve pain control.), Historical      gabapentin (NEURONTIN) 100 MG capsule Take 3 capsules (300 mg) by mouth at bedtime, Disp-30 capsule, R-2, E-Prescribe      tacrolimus (GENERIC) 0.5 MG capsule Take 1 capsule (0.5 mg) by mouth 2 times daily for 90 days, Disp-180 capsule, R-0, E-Prescribe           CONTINUE these medications which have NOT CHANGED    Details   apixaban ANTICOAGULANT (ELIQUIS) 5 MG tablet Take 1 tablet (5 mg) by mouth 2 times daily for 90 days, Disp-180  tablet, R-1, E-Prescribe      hydrOXYzine (ATARAX) 25 MG tablet Take 1 tablet (25 mg) by mouth 3 times daily as needed for itching, Disp-90 tablet, R-0, E-Prescribe      !! Lacosamide (VIMPAT) 100 MG TABS tablet Take 1 tablet (100 mg) by mouth every evening, Disp-31 tablet, R-5, E-Prescribe      !! lacosamide (VIMPAT) 50 MG TABS tablet Take 50 mg by mouth every morning, Historical      melatonin 3 MG tablet Take 1 tablet (3 mg) by mouth At Bedtime, Disp-90 tablet, R-1, E-Prescribe      !! midodrine (PROAMATINE) 10 MG tablet Take 10 mg by mouth 3 times daily, Historical      !! midodrine (PROAMATINE) 10 MG tablet Take 10-20 mg by mouth 4 times daily as needed (On dialysis days) On dialysis days - patient takes 1 tablet one hour before dialysis, 1 tablet when he gets there, 1 tablet during dialysis and 1-2 tablets after dialysis. Max 5 tablets, Historical      multivitamin RENAL (TRIPHROCAPS) 1 capsule capsule Take 1 capsule by mouth daily, Disp-30 capsule, R-1, E-Prescribe      rOPINIRole (REQUIP) 0.5 MG tablet Take 0.5 mg by mouth 2 times daily as needed (restless legs), Historical      sevelamer carbonate (RENVELA) 800 MG tablet Take 800 mg by mouth 4 times daily With meals and snacks, Historical       !! - Potential duplicate medications found. Please discuss with provider.        STOP taking these medications       oxyCODONE (ROXICODONE) 5 MG tablet Comments:   Reason for Stopping:             Allergies   No Known Allergies

## 2023-11-01 NOTE — PROGRESS NOTES
Care Management Discharge Note    Discharge Date: 11/01/2023       Discharge Disposition: Home    Discharge Services:  OP HD, OP Para    Discharge DME:  n/a     Discharge Transportation:  family will transport     Private pay costs discussed: Not applicable    Does the patient's insurance plan have a 3 day qualifying hospital stay waiver?  No    PAS Confirmation Code:  n/a   Patient/family educated on Medicare website which has current facility and service quality ratings:  n/a     Education Provided on the Discharge Plan:  Yes   Persons Notified of Discharge Plans: Pt, OP HD  Patient/Family in Agreement with the Plan:      Handoff Referral Completed: Yes    Additional Information:  RNCC notified Pt is medically ready for discharge and will need and OP Paracentesis scheduled for tomorrow at the [facility]. Apt confirmed for Thursday Nov 2, 2023 12:30 PM. Paracentesis Order was placed for a one time need. RNCC paged Dr. Arriola with update and requested additional standing order, if needed,  with discharge orders.     RNCC faxed DS summary to Deirdre.     [facility]  Phone: [phone]  Fax: [phone]    Pt's family at bedside and will transport home. No other discharge needs identified at this time.    Sharon De Leon RN  [facility]   [phone]

## 2023-11-01 NOTE — SUMMARY OF CARE
Providing pt with empathetic listening and answering pt questions about care. Pt alert and oriented x4 but can be forgetful at times. VSS on RA. CPAP discontinued and not given tonight as pt refuses. Pt c/o of pain at L fistula site and given PRN tylenol x1. Midodrine given x2 as pt's SBP was <90 at start of shift. Pt's SBP was over 90 after recheck. Pt given lactulose overnight. Anuric due to HD. R PIV SL; has R dialysis line. Plan do discharge home today with family with outpatient HD.

## 2023-11-01 NOTE — PROGRESS NOTES
Date: 11/1/2023  Time: 1236     Data:  Pre Wt:   86.9 kg ( as of 10/31/2023)  Desired Wt:   To be established  Post Wt:  84.4 kg (estimated)  Weight change: - 2.5 kg  Ultrafiltration - Post Run Net Total Removed (mL):  2500 ml  Vascular Access Status: Fistula patent  Dialyzer Rinse:  Light  Total Blood Volume Processed: 78.77 L   Total Dialysis (Treatment) Time:   3.5 Hrs  Dialysate Bath: K 3, Ca 2.25  Heparin: Heparin: None     Lab:   No      Interventions:  Dialysis done through L AV Fistula using 15 gauge needles.     Initially set UF to 2L.  Patient requested to increase goal to 2.5 UF Liters, accommodating priming and rinse back volumes    ,   Epogen/ Lidocaine / Midodrine administered per MAR  CritLine showed a stable Profile B throughout the run, tolerating UF pull  Decannulation done post HD, hemostasis is achieved in 10 minutes  Pressure dressing is applied, to be removed after 4-6 hours  Report given to PCN, sent back to his room in stable condition     Assessment:  A/O x 4, calm and cooperative, denies pain  L arm AV Fistula has good thrill and bruit                Plan:    Per Renal team        Marilia Rogers, RN  Acute Dialysis RN  [facility]

## 2023-11-01 NOTE — PROGRESS NOTES
Nephrology Progress Note  11/01/2023         Assessment & Recommendations:   Blanco Osborne is a 58 year old male with PMH of cirrhosis secondary to NAFLD s/p liver transplant  in 2016 now with progressive cirrhosis (liver bx 12/2022) with evidence of portal hypertension and ascites, paroxysmal atrial fibrillation on Eliquis, DM type 2, PEA arrest 12/2022, history of CVA, HTN, CHRISTIAN on BiPAP, ESRD on dialysis who presented to [facility] ED on 10/5/23 with chest discomfort and palpitations, admitted with severe sepsis with septic shock requiring pressors secondary to multifactorial pneumonia, atrial fibrillation with aberrancy s/p 2 failed attempts at cardioversion, worsening pericardial effusion on ECHO s/p pericardiocentesis 10/11, and PE started on IV heparin. 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 Has required multiple taps with subsequent chylous ascites findings despite tailored diet. Transferred to [facility] to be seen by liver transplant team. Being treated for SBP. Deemed not a liver tx candidate     # ESKD: HD dependent ~ 1yr. Dialyzes MWF at [facility] SLP with Dr. Bradshaw. EDW 85.5 kg. Access: tunneled RIJ (used L AVF 10/31 and 11/1 with 15g needles, no issues)  - HD MWF dialysis         # Volume: EDW 85.5 kg (highly dependent on ascites at this point); newly developing chylous ascites; had RHC 10/11 at wt of 87.7 kg, normal R and L filling pressures, mild pulm HTN, L pericardial effusion; echo 10/17 with hyperkinetic EF > 70 and no pericardial effusion s/p drain  # Chronic hypotension  - requires midodrine before HD and often again during  - usual OP UF 2 to 3 kg  - s/p 2.7 kg para 10/24, para on 10/28 diagnostic only     # Hypervolemic hyponatremia: 129  - please strictly enforce 1200 ml fluid restriction and 2g Na diet  - will improve but not fully correct with HD       # BMD: Ca 8's, alb 2.8, phos 3.1  - reduced sevelamer to 800 mg tid (not qid)  -  ordered phos recheck     # Anemia of CKD: hgb 7-8's; iron studies 10/24: ferritin 423, iron 38, IS 20  - continue epo 8K per run  - would benefit from iron loading via HD now that pt is off antibiotics (other than ppx cipro)     # SBP: neutrophils improved to 203 on 10/28  - cefepime completed 10/30 (may well be contributing to current increasing confusion due to neurotoxicity; cefepime is about 40% dialyzable)  # s/p liver transplant: now with cirrhosis, portal HTN, chylous ascites  - has been deemed not eligible for repeat liver tx        Recommendations were communicated to primary team via this note and in person         JEANCARLOS Fowler   Division of Renal Disease and Hypertension  [phone]    Interval History :   Seen on dialysis, stable run for 2.5 kg.  Used AVF with no issues again today. No CP, SOB, n/v, chills    Review of Systems:   4 point ROS neg other than as noted above    Physical Exam:   No intake/output data recorded.   BP 92/66   Pulse 68   Temp 98.5  F (36.9  C)   Resp 18   Wt 86.9 kg (191 lb 9.3 oz)   SpO2 99%   BMI 25.98 kg/m       GENERAL APPEARANCE: NAD  EYES: no scleral icterus, pupils equal  Pulmonary: no increased WOB  CV: RRR   - Edema no peripheral  GI: soft, distended, NT  MS: no evidence of inflammation in joints, no muscle tenderness  : no gomez  SKIN: no rash, warm, dry, no cyanosis  NEURO: face symmetric, a/o3  Access: CVC, newly revised L AVF working well    Labs:   All labs reviewed by me  Electrolytes/Renal -   Recent Labs   Lab Test 11/01/23  0634 10/31/23  0655 10/30/23  0735 10/30/23  0459 10/24/23  0537 10/23/23  0654 10/06/23  1530 10/06/23  1040 10/06/23  0905 10/06/23  0533 10/05/23  0251 10/05/23  0245 04/25/23  1113 04/24/23  0815 04/19/23  1433 04/18/23  0928   * 129*  --  124*   < > 121*   < >  --   --  134*   < > 136   < > 138   < > 136   POTASSIUM 4.4 4.6 5.6* 6.1*   < > 5.5*   < >  --   --  4.6   < > 3.8   < > 4.9   < > 4.2   CHLORIDE 93* 93*  --   89*   < > 87*   < >  --   --  94*  --  90*   < > 98   < > 97*   CO2 21* 23  --  20*   < > 19*   < >  --   --  23  --  24   < > 25   < > 25   BUN 67.1* 47.4*  --  79.9*   < > 87.9*   < >  --   --  60.5*  --  44.5*   < > 93.8*   < > 69.9*   CR 7.05* 5.49*  --  7.92*   < > 8.03*   < >  --   --  7.03*  --  5.77*   < > 7.83*   < > 5.62*   GLC 94 99  --  91   < > 107*   < >  --    < > 152*   < > 127*   < > 97   < > 124*   KELLY 8.9 8.8  --  8.8   < > 8.8   < >  --   --  8.9  --  9.4   < > 10.4*   < > 10.3*   MAG  --   --   --   --   --   --   --   --   --   --   --  2.1  --  2.4*  --  2.0   PHOS  --   --   --   --   --  3.1  --  6.6*  --  6.7*  --  5.2*   < > 8.0*   < > 5.5*    < > = values in this interval not displayed.       CBC -   Recent Labs   Lab Test 11/01/23  0634 10/31/23  0655 10/30/23  0459   WBC 5.6 5.6 5.5   HGB 7.3* 7.1* 7.8*   * 129* 128*       LFTs -   Recent Labs   Lab Test 11/01/23 0634 10/31/23  0655 10/30/23  0459   ALKPHOS 222* 235* 241*   BILITOTAL 0.3 0.3 0.3   ALT <5 <5 <5   AST 15 16 15   PROTTOTAL 5.9* 6.0* 5.9*   ALBUMIN 2.8* 2.8* 2.9*       Iron Panel -   Recent Labs   Lab Test 10/24/23  0537 04/14/23  0725 04/06/23  0808 03/25/23  1058 03/12/23  0756   IRON 38* 60* 78  --  37*   IRONSAT 20 29  --   --  26   HOSSEIN 423* 286  --    < > 252    < > = values in this interval not displayed.         Imaging:  Reviewed    Current Medications:   apixaban ANTICOAGULANT  5 mg Oral BID    ciprofloxacin  250 mg Oral Q24H BIJU    gabapentin  300 mg Oral At Bedtime    Lacosamide  100 mg Oral QPM    lacosamide  50 mg Oral QAM    lactulose  20 g Oral BID    multivitamin RENAL  1 capsule Oral Daily    pantoprazole  40 mg Oral QAM AC    sevelamer carbonate  800 mg Oral TID w/meals    tacrolimus  0.5 mg Oral BID IS      - MEDICATION INSTRUCTIONS -       JEANCARLOS Fowler

## 2023-11-01 NOTE — PROGRESS NOTES
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      RECOMMENDATIONS:  # DDLT 9/20/16 for MASH  # Immunosuppression  # Recurrent cirrhosis  -Solitary alk phos stable over the past year.  Liver tests other wise wnl.    - continue tacrolimus 0.5 mg BID, trough level 10/30 3.8.  goal trough level 3-5. He remains on HD. He will need new capsule order for 0.5 mg at discharge.   - evidence of recurrent cirrhosis on liver biopsy 12/2022, he did have alcohol use post transplant and continues to have metabolic syndrome that may also be contributing. Poor sample to differentiate cause, would not recommend repeating.  Peth has been negative, last +ETG 2017.  Has not had EGD posttransplant, this will be scheduled as OP.  Continues to have splenomegaly on recent CT.  No evidence of HCC.  - Not a candidate for SKT or SLK.      # Ascites  # SBP  # chylous ascites  # Loculated ascites  - repeat paracentesis 10/28 with normal PMN's, continues to have elevated WBC. Has been seen by Tx ID, received x2 weeks of cefepime- last dose today.  Triglycerides decreased. Continue with low fat diet for now. Will be on ciprofloxacin, Ciprofloxacin 250 mg daily for SBP ppx.   - ascites with SAAG consistent with portal hypertension. Likely cause is related to cirrhosis. Higher total protein likely related to CKD on HD. Flow cytometry did have atypical cells, may consider repeat flow cytometry or blood cytometry.   - not a  candidate for diuretics or TIPS due to CKD/HD.   - Cell count remained elevated on para 10/30. CT abdomen with septations. ID does not feel further abx indicated. Will continue to monitor, clinically stable.     Patient was discussed with attending physician, Dr. Jigna Reid.  The above note reflects our joint plan.     Next follow up appointment: Dr. Simms 12/29/2023    Thank you for the opportunity to be involved with  Blanco Osborne care. Please call with any questions or concerns.    Jacklyn Liu, APRN, CNP  Inpatient Hepatology [facility]        SUBJECTIVE:  Denies increase in abdominal pain, nausea or vomiting. Reports looking forward to discharge from hospital.     ROS:  A 10-point review of systems was negative.    OBJECTIVE:  VS: BP 97/60   Pulse 67   Temp 98.5  F (36.9  C)   Resp 18   Wt 86.9 kg (191 lb 9.3 oz)   SpO2 99%   BMI 25.98 kg/m        General: In no acute distress, mild facial muscle wasting  Neuro: AOx3,   HEENT:  Noscleral icterus, Nooral lesions  CV:  Skin warm and dry  Lungs:  Respirations even and nonlabored on room air  Abd:  Mildly distended, nontender   Extrem: Noperipheral edema   Skin: Nojaundice  Psych: pleasant    MEDICATIONS:  Current Facility-Administered Medications   Medication    acetaminophen (TYLENOL) tablet 650 mg    apixaban ANTICOAGULANT (ELIQUIS) tablet 5 mg    camphor-menthol (DERMASARRA) lotion    ciprofloxacin (CIPRO) tablet 250 mg    gabapentin (NEURONTIN) capsule 300 mg    hydrOXYzine (ATARAX) tablet 25 mg    lacosamide (VIMPAT) tablet 100 mg    lacosamide (VIMPAT) tablet 50 mg    lactulose (CHRONULAC) solution 20 g    lidocaine 1 % 0.5 mL    melatonin tablet 3 mg    midodrine (PROAMATINE) tablet 10-20 mg    midodrine (PROAMATINE) tablet 10-20 mg    multivitamin RENAL (TRIPHROCAPS) capsule 1 capsule    naloxone (NARCAN) injection 0.2 mg    Or    naloxone (NARCAN) injection 0.4 mg    Or    naloxone (NARCAN) injection 0.2 mg    Or    naloxone (NARCAN) injection 0.4 mg     ondansetron (ZOFRAN ODT) ODT tab 4 mg    Or    ondansetron (ZOFRAN) injection 4 mg    oxyCODONE IR (ROXICODONE) half-tab 2.5 mg    pantoprazole (PROTONIX) EC tablet 40 mg    polyethylene glycol (MIRALAX) Packet 17 g    rOPINIRole (REQUIP) tablet 0.5 mg    senna-docusate (SENOKOT-S/PERICOLACE) 8.6-50 MG per tablet 1 tablet    Or    senna-docusate (SENOKOT-S/PERICOLACE) 8.6-50 MG per tablet 2 tablet    sevelamer carbonate (RENVELA) tablet 800 mg    sodium chloride 0.9% BOLUS 100-150 mL    Stop Heparin 60 minutes before end of treatment    tacrolimus (GENERIC) capsule 0.5 mg       REVIEW OF LABORATORY, PATHOLOGY AND IMAGING RESULTS:  BMP  Recent Labs   Lab 11/01/23  0634 10/31/23  0655 10/30/23  0735 10/30/23  0459 10/29/23  0745   * 129*  --  124* 127*   POTASSIUM 4.4 4.6 5.6* 6.1* 4.8   CHLORIDE 93* 93*  --  89* 91*   KELLY 8.9 8.8  --  8.8 9.3   CO2 21* 23  --  20* 22   BUN 67.1* 47.4*  --  79.9* 64.9*   CR 7.05* 5.49*  --  7.92* 6.68*   GLC 94 99  --  91 114*     CBC  Recent Labs   Lab 11/01/23  0634 10/31/23  0655 10/30/23  0459 10/29/23  0745   WBC 5.6 5.6 5.5 7.1   RBC 2.35* 2.29* 2.48* 2.66*   HGB 7.3* 7.1* 7.8* 8.4*   HCT 23.4* 22.5* 25.1* 26.4*    98 101* 99   MCH 31.1 31.0 31.5 31.6   MCHC 31.2* 31.6 31.1* 31.8   RDW 16.3* 17.0* 16.5* 17.2*   * 129* 128* 152     INR  Recent Labs   Lab 10/27/23  0655 10/26/23  0525   INR 1.67* 1.70*     LFTs  Recent Labs   Lab 11/01/23  0634 10/31/23  0655 10/30/23  0459 10/29/23  0745   ALKPHOS 222* 235* 241* 228*   AST 15 16 15 18   ALT <5 <5 <5 <5   BILITOTAL 0.3 0.3 0.3 0.4   PROTTOTAL 5.9* 6.0* 5.9* 6.3*   ALBUMIN 2.8* 2.8* 2.9* 3.1*        MELD 3.0: 31 at 10/29/2023  7:45 AM  MELD-Na: 31 at 10/29/2023  7:45 AM  Calculated from:  Serum Creatinine: On dialysis. Using the maximum value.  Serum Sodium: 127 mmol/L at 10/29/2023  7:45 AM  Total Bilirubin: 0.4 mg/dL (Using min of 1 mg/dL) at 10/29/2023  7:45 AM  Serum Albumin: 3.1 g/dL at 10/29/2023  7:45  "AM  INR(ratio): 1.67 at 10/27/2023  6:55 AM  Age at listin years  Sex: Male at 10/29/2023  7:45 AM    Previous work-up:   Lab Results   Component Value Date    HEPBANG Nonreactive 10/30/2023    HBCAB Nonreactive 2023    AUSAB 0.42 10/30/2023    HCVAB  2016     Nonreactive   Assay performance characteristics have not been established for newborns,   infants, and children      HCVRNA Not Detected 2022    HOSSEIN 423 (H) 10/24/2023    IRONSAT 20 10/24/2023    TSH 4.89 (H) 2023    CHOL 87 10/13/2023    HDL 42 10/13/2023    LDL 32 10/13/2023    TRIG 65 10/13/2023    A1C 4.7 08/10/2023    POPETH <10 10/20/2023    PLPETH <10 10/20/2023      No results found for: \"SPECDES\", \"LDRESULTS\"      Imaging Results:  CT abd wo 10/31/23  IMPRESSION:   1. Stable moderate septated ascites.  2. New enlarged right inguinal lymph nodes. These are indeterminate  and may represent reactive lymphadenopathy. Attention on follow-up.  3. Stable bibasilar atelectasis with right greater than left small  pleural effusions.  4. Small pericardial effusion.  5. Splenomegaly.    "

## 2023-11-01 NOTE — PLAN OF CARE
Goal Outcome Evaluation:  A&Ox4. HD completed today. Pt c/o itching. Administered atarax. Pt c/o pain at fistula site, administered oxycodone this AM. Pt discharging home with wife. To  meds from [facility]. Removed PIV. Went over AVS. Belongings given to wife.

## 2023-11-02 ENCOUNTER — OFFICE VISIT (OUTPATIENT)
Dept: FAMILY MEDICINE | Facility: CLINIC | Age: 58
End: 2023-11-02
Payer: COMMERCIAL

## 2023-11-02 ENCOUNTER — HOSPITAL ENCOUNTER (OUTPATIENT)
Dept: CARDIAC REHAB | Facility: CLINIC | Age: 58
Discharge: HOME | End: 2023-11-02
Attending: STUDENT IN AN ORGANIZED HEALTH CARE EDUCATION/TRAINING PROGRAM
Payer: COMMERCIAL

## 2023-11-02 VITALS
TEMPERATURE: 97.5 F | HEART RATE: 81 BPM | SYSTOLIC BLOOD PRESSURE: 104 MMHG | WEIGHT: 191 LBS | OXYGEN SATURATION: 99 % | BODY MASS INDEX: 25.87 KG/M2 | DIASTOLIC BLOOD PRESSURE: 69 MMHG | HEIGHT: 72 IN | RESPIRATION RATE: 16 BRPM

## 2023-11-02 DIAGNOSIS — N18.6 ESRD (END STAGE RENAL DISEASE) ON DIALYSIS (H): ICD-10-CM

## 2023-11-02 DIAGNOSIS — Z94.4 LIVER TRANSPLANTED (H): ICD-10-CM

## 2023-11-02 DIAGNOSIS — R79.89 ELEVATED TROPONIN: ICD-10-CM

## 2023-11-02 DIAGNOSIS — R06.83 SNORING: ICD-10-CM

## 2023-11-02 DIAGNOSIS — K74.60 CIRRHOSIS OF LIVER WITH ASCITES, UNSPECIFIED HEPATIC CIRRHOSIS TYPE (H): ICD-10-CM

## 2023-11-02 DIAGNOSIS — Z99.2 ESRD (END STAGE RENAL DISEASE) ON DIALYSIS (H): ICD-10-CM

## 2023-11-02 DIAGNOSIS — Z09 HOSPITAL DISCHARGE FOLLOW-UP: Primary | ICD-10-CM

## 2023-11-02 DIAGNOSIS — R18.8 CIRRHOSIS OF LIVER WITH ASCITES, UNSPECIFIED HEPATIC CIRRHOSIS TYPE (H): ICD-10-CM

## 2023-11-02 LAB
ATRIAL RATE - MUSE: 86 BPM
BACTERIA FLD CULT: NO GROWTH
DIASTOLIC BLOOD PRESSURE - MUSE: NORMAL MMHG
GRAM STAIN RESULT: NORMAL
GRAM STAIN RESULT: NORMAL
INTERPRETATION ECG - MUSE: NORMAL
P AXIS - MUSE: 21 DEGREES
PR INTERVAL - MUSE: 204 MS
QRS DURATION - MUSE: 76 MS
QT - MUSE: 334 MS
QTC - MUSE: 399 MS
R AXIS - MUSE: 12 DEGREES
SYSTOLIC BLOOD PRESSURE - MUSE: NORMAL MMHG
T AXIS - MUSE: 12 DEGREES
VENTRICULAR RATE- MUSE: 86 BPM

## 2023-11-02 PROCEDURE — G0463 HOSPITAL OUTPT CLINIC VISIT: HCPCS

## 2023-11-02 PROCEDURE — 99495 TRANSJ CARE MGMT MOD F2F 14D: CPT | Performed by: PHYSICIAN ASSISTANT

## 2023-11-02 ASSESSMENT — PAIN SCALES - GENERAL: PAINLEVEL: MILD PAIN (3)

## 2023-11-02 NOTE — DISCHARGE SUMMARY
Dialysis Discharge Summary Brief    [facility]  Division of Nephrology  Nephrology Discharge Dialysis Orders  Ph: [phone]  Fax: [phone]    Blanco Osborne  MRN: [id number]  YOB: 1965    [facility] Dialysis Unit: SLP  Primary Nephrologist: Dr. Bradshaw    Date of Admission: 10/23/2023  Date of Discharge: 11/1/2023    Please page me at  with any questions. Thanks much. Teressa Olivas PA-C    Blanco Osborne is a 58 year old male with PMH of cirrhosis secondary to NAFLD s/p liver transplant  in 2016 now with progressive cirrhosis (liver bx 12/2022) with evidence of portal hypertension and ascites, paroxysmal atrial fibrillation on Eliquis, DM type 2, PEA arrest 12/2022, history of CVA, HTN, CHRISTIAN on BiPAP, ESRD on dialysis who presented to [facility] ED on 10/5/23 with chest discomfort and palpitations, admitted with severe sepsis with septic shock requiring pressors secondary to multifactorial pneumonia, atrial fibrillation with aberrancy s/p 2 failed attempts at cardioversion, worsening pericardial effusion on ECHO s/p pericardiocentesis 10/11, and PE started on IV heparin. 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 Has required multiple taps with subsequent chylous ascites findings despite tailored diet. Transferred to [facility] to be seen by liver transplant team. Was treated for SBP. Deemed not a liver tx candidate     # ESKD: HD dependent ~ 1yr. Dialyzes MWF at [facility] SLP with Dr. Bradshaw. EDW 85.5 kg. Access: tunneled RIJ (used L AVF 10/31 and 11/1 with 15g needles, no issues); not eligible for kidney transplant given worsening liver disease and deemed not eligible for repeat liver tx            # Volume: EDW 85.5 kg (highly dependent on ascites at this point); newly developing chylous ascites; had RHC 10/11 at wt of 87.7 kg, normal R and L filling pressures, mild pulm HTN, L pericardial effusion;  echo 10/17 with hyperkinetic EF > 70 and no pericardial effusion s/p drain  # Chronic hypotension  - requires midodrine before HD and often again during  - usual OP UF 2 to 3 kg  - s/p 2.7 kg para 10/24, para on 10/28 diagnostic only     # Hypervolemic hyponatremia: 129  - talked extensively about limiting fluid and sodium intake  - will improve but not fully correct with HD        # BMD: Ca 8's, alb 2.8, phos 4.0  - reduced sevelamer to 800 mg tid (not qid)        # Anemia of CKD: hgb 7-8's; iron studies 10/24: ferritin 423, iron 38, IS 20  -continue mircera protocol (gave epogen 8000 units per HD while inpatient)  - would benefit from iron loading via HD now that pt is off antibiotics (other than ppx cipro)     # SBP: neutrophils improved to 203 on 10/28  - cefepime completed 10/30      # s/p liver transplant: now with cirrhosis, portal HTN, chylous ascites  - has been deemed not eligible for repeat liver tx            Discharge Diagnosis:    ICD-10-CM    1. Cirrhosis of liver with ascites, unspecified hepatic cirrhosis type (H)  K74.60 pantoprazole (PROTONIX) 40 MG EC tablet    R18.8 lactulose (CHRONULAC) 10 GM/15ML solution      2. Elevated troponin  R79.89 Cardiac Rehab Referral      3. Immunosuppression (H24)  D84.9 US Paracentesis with Albumin     CANCELED: US Paracentesis with Albumin      4. SBP (spontaneous bacterial peritonitis) (H)  K65.2 ciprofloxacin (CIPRO) 250 MG tablet     US Paracentesis with Albumin     CANCELED: US Paracentesis with Albumin      5. Other ascites  R18.8 US Paracentesis with Albumin     CANCELED: US Paracentesis with Albumin      6. Liver transplanted (H)  Z94.4 tacrolimus (GENERIC) 0.5 MG capsule      7. NAFLD (nonalcoholic fatty liver disease)  K76.0       8. Dialysis AV fistula malfunction, sequela  T82.590S acetaminophen (TYLENOL) 325 MG tablet      9. Itching  L29.9 gabapentin (NEURONTIN) 100 MG capsule

## 2023-11-02 NOTE — PROGRESS NOTES
Assessment & Plan     Hospital discharge follow-up  See below.  Plan for follow-up 3 to 4 months.  Happy he has started cardiac rehab.    Cirrhosis of liver with ascites, unspecified hepatic cirrhosis type (H)  Liver transplanted (H)  Close follow-up with liver team.  Standing order for ultrasound paracentesis with albumin    - US Paracentesis with Albumin    ESRD (end stage renal disease) on dialysis (H)  Continue dialysis.  Likely needs to hold off on renal transplant now.  Expected to proceed further in the coming months.    Snoring  Discussed need for repeat sleep study stressed in the hospital.  - Adult Sleep Eval & Management  Referral      30 minutes spent by me on the date of the encounter doing chart review, review of test results, interpretation of tests, patient visit, and documentation      MED REC REQUIRED  Post Medication Reconciliation Status: discharge medications reconciled and changed, per note/orders  BMI:   Estimated body mass index is 25.9 kg/m  as calculated from the following:    Height as of this encounter: 1.829 m (6').    Weight as of this encounter: 86.6 kg (191 lb).   Weight management plan: Discussed healthy diet and exercise guidelines    The likelihood of other entities in the differential is insufficient to justify any further testing for them at this time. This was explained to the patient. The patient was advised that persistent or worsening symptoms would require further evaluation. Patient advised to call the office and if unable to reach to go to the emergency room if they develop any new or worsening symptoms. Expressed understanding and agreement with above stated plan.     WALDO Ronquillo [facility] MECHE Simeon is a 58 year old, presenting for the following health issues:  No chief complaint on file.    Complex medical history.  Significant hospitalization.  Here today with his brother for hospital discharge follow-up.  Doing  well since returning home.  Has close follow-up with liver team as well as cardiology.  Leaving for [place] soon.  Needs complete paracentesis prior to this.  BP stable.  Using midodrine especially on days that he has dialysis.    History of Present Illness       Reason for visit:  Get orders    He eats 2-3 servings of fruits and vegetables daily.He consumes 0 sweetened beverage(s) daily.He exercises with enough effort to increase his heart rate 10 to 19 minutes per day.  He exercises with enough effort to increase his heart rate 4 days per week.   He is taking medications regularly.       Hospital Follow-up Visit:    Hospital/Nursing Home/IP Rehab Facility: [facility]  Date of Admission: 10/23/2023  Date of Discharge: 11/1/2023  Reason(s) for Admission:     Cirrhosis secondary to NAFLD s/p liver transplant 2016 now with progressive cirrhosis with evidence of portal hypertension and ascites   SBP   Chylous ascites   Moderate protein-energy malnutrition  Abdominal pain     Was your hospitalization related to COVID-19? No   Problems taking medications regularly:  None  Medication changes since discharge: Yes, updated on med list   Problems adhering to non-medication therapy:  None    Summary of hospitalization:  [facility] discharge summary reviewed  Diagnostic Tests/Treatments reviewed.  Follow up needed: Liver team  Other Healthcare Providers Involved in Patient s Care:         cardiology, liver, fastro  Update since discharge: stable.         Plan of care communicated with patient and family           Hospital Course  # SBP   # Chylous ascites   # Abdominal pain   Ascites likely related to portal HTN in the setting of cirrhosis, further c/b disruption of lymphatics (d/t increased lymph flow and portal HTN seen with cirrhosis) leading to chylous ascites as reflective of persistently elevated triglyceride. Has been on different antibiotics (see admission  note).  Diagnostic and therapeutic paracentesis repeated multiple times during hospitalization with down-trending PMN count suggesting some improvement with antibiotics. PMNs on 10/28 are improved to 203.  - Hepatology consulted, appreciate recs  - Transplant Infectious disease consulted, appreciate recs  - Cefepime 1g Q24h through 10/30 completed  - Ciprofloxacin 250mg daily indefinitely for prophylaxis        # Cirrhosis secondary to NAFLD s/p liver transplant 2016 now with recurrent cirrhosis with evidence of portal hypertension and ascites   #Immunosuppression  Patient follows with Dr. Simms, though notably has not been seen in years; last clinic visit 2020 with plan for 6 month follow up. Next appt 2023. Evidence of recurrent cirrhosis on liver biopsy 2022, he did have alcohol use post transplant and continues to have metabolic syndrome that can also contribute. Continues to have splenomegaly on recent CT.  No evidence of HCC.  - Per hepatology note, not a candidate for KT or SLK  MELD 3.0: 31 at 10/29/2023  7:45 AM  MELD-Na: 31 at 10/29/2023  7:45 AM  Calculated from:  Serum Creatinine: On dialysis. Using the maximum value.  Serum Sodium: 127 mmol/L at 10/29/2023  7:45 AM  Total Bilirubin: 0.4 mg/dL (Using min of 1 mg/dL) at 10/29/2023  7:45 AM  Serum Albumin: 3.1 g/dL at 10/29/2023  7:45 AM  INR(ratio): 1.67 at 10/27/2023  6:55 AM  Age at listin years  Sex: Male at 10/29/2023  7:45 AM  - Lactulose for 2-3 BM per day  - Diagnostic paracentesis 10/30 with rising PMN count, ID favors observation rather than extending treatment course      # Moderate protein-energy malnutrition  - Nutrition consulted,  appreciate recs  - 2gm NA and very low fat diet  - GI wanted to continue very low fat diet, as do not expect TG to change quickly   - Likely plan to continue very low fat (LCT) diet for 2-3 weeks then liberalize fat restriction for LCT challenge and repeat TG on next paracentesis to evaluate if  chylous ascites persists.     #LUE Hematoma  Swelling and tenderness on the left arm fistula site which was initially concerning for fistula site infection however imaging consistent with hematoma.  - 10/23 Blood Cultures NGTD  - Ice / Heat to affected area  - Lidocaine topical to affected area  - Wean oxycodone 5mg BID prn      # Pulmonary Infiltrates  CT scan showed pulmonary infiltrates, based on progression of symptoms low concern for infectious etiology.  - COVID 19/influenza/RSV negative  - Unable to provide sputum specimen     # Recurrent pericardial effusion  # Paroxysmal atrial fibrillation   # NSTEMI  Troponin did peak at 346 on 10/05 (thought to be due to demand because of shock and EKG did not had any ST elevation). ECHO 10/10 with recurrent pericardial effusion slightly larger than seen on ECHO 10/8 s/p pericardiocentesis 10/11 with 560cc removed, s/p RHC 10/11 which showed normal right and left sided filling pressures, mild elevated pulmonary pressures and portal hypertension with hepatic vein pressure gradient of 10mmHg. Drain removed on 10/12. Repeat ECHO 10/17 with no recurrent pericardial effusion, LVEF >70%.  - If patient requires repeat pericardiocentesis in future then send for AFB/fungal culture, 16 S and 28 S  - Cardiology had signed off at previous hospital  - Continue PTA eliquis 5mg BID      # Pulmonary embolism diagnosed 10/5/2023  - Continue PTA eliquis 5mg BID     #Previous embolic stroke in 11/2022     # ESRD on HD MWF  # S/p left AV fistula pseudoaneurysm repair, 8/2023  # LUE AV fistula infiltrated on 10/6  - Dialysis per nephrology  - Continue renal multivitamin  - Continue PTA sevelamer 800mg QID     # Severe sepsis with septic shock - due to multifactorial pneumonia -resolved  # Chronic hypotension, on midodrine  - Is normally on midodrine for chronic hypotension, was adjusted multiple times during this hospitalization.     - Off pressors since 10/7/2023  - Random cortisol level  checked on 10/13, which was normal     # Probable CHRISTIAN  Per patient's wife, he had a sleep study about 10 years ago but does not have any CPAP or BiPAP at home.  - CPAP ordered for sleep  - Should undergo sleep study as outpatient after discharge     # Anemia of chronic disease  Hgb baseline between 8-9  - Hemoglobin is stable on monitoring     # Chronic Thrombocytopenia  Likely due to liver disease.   - Continue to monitor     # Restless leg syndrome  -Continue PTA ropinirole     # Seizure disorder  - Continue PTA Vimpat     # GERD  -Continue PTA PPI        Review of Systems   Constitutional, HEENT, cardiovascular, pulmonary, GI, , musculoskeletal, neuro, skin, endocrine and psych systems are negative, except as otherwise noted.      Objective    There were no vitals taken for this visit.  There is no height or weight on file to calculate BMI.  Physical Exam   EXAM:  GENERAL APPEARANCE: healthy, alert and no distress  EYES: Eyes grossly normal to inspection, PERRL and conjunctivae and sclerae normal  NECK: no asymmetry, masses, or scars  RESP: lungs clear to auscultation - no rales, rhonchi or wheezes  CV: regular rates and rhythm, normal S1 S2, no S3 or S4 and no murmur, click or rub  MS: extremities normal- no gross deformities noted  SKIN: Fistula left upper arm.  No active bleeding.  No suspicious lesions or rashes  NEURO: Normal strength and tone, mentation intact and speech normal  PSYCH: mentation appears normal and affect normal

## 2023-11-02 NOTE — PROGRESS NOTES
"Patient came in for initial evaluation of cardiac rehab today. During appointment, patient requested a blood pressure check and reported, \" I feel like it is low.\" Patient has a history of chronic hypotension and typically takes Midodrine HCl. BP was assessed and was 82/60. HR was 74 bpm. Patient's brother is going to come pick him up and will bring his Midodrine dose. Patient's initial evaluation was rescheduled for 11/7/23.     15 minutes spent face-to-face reviewing cardiac rehab program, discussing current symptoms and assessing vitals.     Can Lubin  Cardiac Rehab Therapist    "

## 2023-11-03 LAB — BACTERIA FLD CULT: NO GROWTH

## 2023-11-04 LAB — BACTERIA FLD CULT: NO GROWTH

## 2023-11-06 ENCOUNTER — HOSPITAL ENCOUNTER (OUTPATIENT)
Dept: CARDIOLOGY | Facility: CLINIC | Age: 58
Discharge: HOME | End: 2023-11-06
Attending: NURSE PRACTITIONER | Admitting: NURSE PRACTITIONER
Payer: COMMERCIAL

## 2023-11-06 DIAGNOSIS — I31.39 PERICARDIAL EFFUSION: ICD-10-CM

## 2023-11-06 LAB — LVEF ECHO: NORMAL

## 2023-11-06 PROCEDURE — 93308 TTE F-UP OR LMTD: CPT

## 2023-11-06 PROCEDURE — 93325 DOPPLER ECHO COLOR FLOW MAPG: CPT

## 2023-11-06 PROCEDURE — 93325 DOPPLER ECHO COLOR FLOW MAPG: CPT | Mod: 26 | Performed by: STUDENT IN AN ORGANIZED HEALTH CARE EDUCATION/TRAINING PROGRAM

## 2023-11-06 PROCEDURE — 93321 DOPPLER ECHO F-UP/LMTD STD: CPT | Mod: 26 | Performed by: STUDENT IN AN ORGANIZED HEALTH CARE EDUCATION/TRAINING PROGRAM

## 2023-11-06 PROCEDURE — 93308 TTE F-UP OR LMTD: CPT | Mod: 26 | Performed by: STUDENT IN AN ORGANIZED HEALTH CARE EDUCATION/TRAINING PROGRAM

## 2023-11-07 DIAGNOSIS — G25.81 RESTLESS LEG SYNDROME: ICD-10-CM

## 2023-11-07 DIAGNOSIS — N18.6 ESRD (END STAGE RENAL DISEASE) ON DIALYSIS (H): ICD-10-CM

## 2023-11-07 DIAGNOSIS — Z99.2 ESRD (END STAGE RENAL DISEASE) ON DIALYSIS (H): ICD-10-CM

## 2023-11-07 RX ORDER — B COMPLEX, C NO.20/FOLIC ACID 1 MG
1 CAPSULE ORAL DAILY
Qty: 30 CAPSULE | Refills: 3 | Status: SHIPPED | OUTPATIENT
Start: 2023-11-07 | End: 2024-04-11

## 2023-11-09 DIAGNOSIS — G25.81 RESTLESS LEGS SYNDROME (RLS): Primary | ICD-10-CM

## 2023-11-09 RX ORDER — ROPINIROLE 0.5 MG/1
0.5 TABLET, FILM COATED ORAL 2 TIMES DAILY PRN
Qty: 180 TABLET | Refills: 1 | Status: SHIPPED | OUTPATIENT
Start: 2023-11-09 | End: 2023-12-07

## 2023-11-10 ENCOUNTER — TELEPHONE (OUTPATIENT)
Dept: MEDSURG UNIT | Facility: CLINIC | Age: 58
End: 2023-11-10
Payer: COMMERCIAL

## 2023-11-10 ENCOUNTER — HOSPITAL ENCOUNTER (OUTPATIENT)
Facility: CLINIC | Age: 58
End: 2023-11-10
Payer: COMMERCIAL

## 2023-11-13 LAB — BACTERIA FLD CULT: NO GROWTH

## 2023-11-15 ENCOUNTER — TELEPHONE (OUTPATIENT)
Dept: TRANSPLANT | Facility: CLINIC | Age: 58
End: 2023-11-15
Payer: COMMERCIAL

## 2023-11-15 DIAGNOSIS — Z94.4 LIVER TRANSPLANTED (H): Primary | ICD-10-CM

## 2023-11-15 NOTE — TELEPHONE ENCOUNTER
"Call back from Blanco.  He has questions about his hospitalization.  He has been hospitalized for several months in the past year.  He is on dialysis.   He was told by \"someone\" in the hospital that he may have \"liver issues\". He doesn't think his liver is that bad \"actually I'm healthy - I know what I was like when I needed a liver and I've been going like this for 7 years.  It was a big surprise to me when they told me that I had liver issues\". He does remember that someone talked about his liver bx w/ him:       Case Report   Surgical Pathology Report                         Case: [id number]                                   Authorizing Provider:  Laura Fernandes MD Collected:           12/01/2022 03:38 PM           Ordering Location:     [facility]          Received:            12/01/2022 03:39 PM                                  [facility]                                                     Pathologist:           Twan Benitez MD                                                           Specimen:    Liver                                                                                      Final Diagnosis   LIVER ALLOGRAFT, NEEDLE BIOPSY:  Autolyzed/poorly preserved liver with evidence of cirrhosis  (See Comment)       Electronically signed by Twan Benitez MD on 12/5/2022 at  6:00 PM   Comment  [facility]   The sample size is adequate but apart from the trichrome and reticulin stains demonstrating evidence of cirrhosis with regenerating nodules, the sample is autolyzed from delayed fixation. There is is no immediate suggestion of acute rejection but autolysis makes it difficult to perform further assessment.          The inpatient team pointed me directly to you ( Dr. Simms).  He wants to know what transplant can offer him.  He is wondering if he needs another liver biopsy to see if he really has liver problems.  He has upcoming appt's w/ cardiology, has regular follow -up w/ his PCP, " Dr. Carter.  He has finished work w/ PT.  Neurology tells him  his brain is fine.  He is driving.  He is out walking 4-5 times a day. He is working really hard.  He was out hunting this past weekend.    He would like pharmacy to look thru his meds to see if anything is harmful to his liver.  Message to JAMARI Song referral made.     Looking forward to his appt w/ Dr. Simms in late December.

## 2023-11-15 NOTE — TELEPHONE ENCOUNTER
Patient Call: General  Route to LPN    Reason for call: patient has some questions about requesting medical records and looking to touch base.     Call back needed? Yes    Return Call Needed  Same as documented in contacts section  When to return call?: Same day: Route High Priority

## 2023-11-16 ENCOUNTER — TELEPHONE (OUTPATIENT)
Dept: FAMILY MEDICINE | Facility: CLINIC | Age: 58
End: 2023-11-16

## 2023-11-16 ENCOUNTER — TELEPHONE (OUTPATIENT)
Dept: OTHER | Facility: CLINIC | Age: 58
End: 2023-11-16

## 2023-11-16 ENCOUNTER — OFFICE VISIT (OUTPATIENT)
Dept: CARDIOLOGY | Facility: CLINIC | Age: 58
End: 2023-11-16
Payer: COMMERCIAL

## 2023-11-16 VITALS
DIASTOLIC BLOOD PRESSURE: 74 MMHG | SYSTOLIC BLOOD PRESSURE: 110 MMHG | WEIGHT: 183 LBS | BODY MASS INDEX: 24.79 KG/M2 | HEART RATE: 72 BPM | HEIGHT: 72 IN | OXYGEN SATURATION: 96 %

## 2023-11-16 DIAGNOSIS — Z94.4 LIVER TRANSPLANTED (H): ICD-10-CM

## 2023-11-16 DIAGNOSIS — E78.5 DYSLIPIDEMIA: ICD-10-CM

## 2023-11-16 DIAGNOSIS — Z45.2 PICC (PERIPHERALLY INSERTED CENTRAL CATHETER) IN PLACE: Primary | ICD-10-CM

## 2023-11-16 DIAGNOSIS — I31.39 PERICARDIAL EFFUSION: Primary | ICD-10-CM

## 2023-11-16 DIAGNOSIS — I48.0 PAF (PAROXYSMAL ATRIAL FIBRILLATION) (H): ICD-10-CM

## 2023-11-16 DIAGNOSIS — N18.6 ESRD (END STAGE RENAL DISEASE) ON DIALYSIS (H): ICD-10-CM

## 2023-11-16 DIAGNOSIS — Z99.2 ESRD (END STAGE RENAL DISEASE) ON DIALYSIS (H): ICD-10-CM

## 2023-11-16 PROCEDURE — 99214 OFFICE O/P EST MOD 30 MIN: CPT | Performed by: NURSE PRACTITIONER

## 2023-11-16 RX ORDER — APIXABAN 5 MG/1
5 TABLET, FILM COATED ORAL 2 TIMES DAILY
COMMUNITY
Start: 2023-11-02 | End: 2024-01-29

## 2023-11-16 NOTE — PROGRESS NOTES
~Cardiology Clinic Visit~    Primary Cardiologist: Dr. Abreu  Reason for visit: Hospital follow up     History of Present Illness  Blanco Osborne is a very pleasant 58 year old male with a past medical history notable for Patient is 57 years old with alcoholic cirrhosis status post liver transplant in 2016, type 2 diabetes, chronic immunosuppression, PAF, previous stroke, hypertension, seizure disorder, obstructive sleep apnea on BiPAP following respiratory arrest in November 2022 which ended with end-stage renal disease needing dialysis.        He has been referred to cardiology following recent hospitalization where he was noted to have paroxysmal atrial fibrillation and appropriately started on anticoagulation for stroke prophylaxis, and consulted with Dr. Abreu on 5/4/23.  In the past, he has been seen by Dr. Morales at [facility] cardiology in 2016.      He was hospitalized for 3-months from 1/21/23 - 4/28/23.  He was recovering in long-term care at [place] and transferred to [facility] on 1/21/2023 for evaluation of loculated pleural effusion.  During this he had acute metabolic encephalopathy with delirium, chronic hepatic encephalopathy, bilateral pleural effusions and acute respiratory failure requiring tracheostomy, ARDS, pneumonia.  He had multispecialty consultation including thoracic surgery, infectious diseases, pulmonology, neurology.  He was treated with antifungal therapy.  Had subacute bacterial peritonitis that resolved.      During all of this he had episodes of paroxysmal atrial fibrillation. But he is chronically anticoagulated on apixaban and currently in sinus rhythm at 65 bpm. He cannot tolerate rate controlling medication such as beta-blocker due to low resting blood pressure in the 100s systolic.      Echocardiogram in March 2023 showed normal LVEF, no significant valve disease, no obvious vegetations, but with note of a small posterior pericardial effusion without  tamponade.  We followed with many serial echocardiograms since the initial effusion was discovered.  With this, the effusion continued to develop, and eventually he returned to [facility] for pericardiocentesis on 9/29/23.    Diagnostics:  11/6/23 Echocardiogram:  Global and regional left ventricular function is normal with an EF of 60-65%.  Global right ventricular function is normal.  Mild aortic insufficiency is present.  IVC diameter <2.1 cm collapsing >50% with sniff suggests a normal RA pressure  of 3 mmHg.  No pericardial effusion is present.     Interval 11/16/23    Patient has no new cardiac concerns to discuss today.  He denies SOB, chest pain or pressure, palpitations, orthopnea, PND.  He is a little lightheaded after dialysis, and they removed 2.5L fluid.  He has had no falls or syncope.  __________________________________________________________________          Assessment and Impression:      1.  Paroxysmal atrial fibrillation.  Currently in sinus rhythm.  Appropriately anticoagulated with apixaban.  Not a candidate for AV slowing drugs due to resting hypotension.  2.  Complex alcoholic liver cirrhosis, status post liver transplant in 2016, chronically immunosuppressed.  3.  End-stage renal disease, dialysis via PCAD on MWF, new AVF.  4.  Type 2 diabetes mellitus.  5.  Significant physical debility secondary to recent prolonged 3-month hospitalization for respiratory failure.  6. Pericardial effusion, resolved per recent echocardiogram.          Recommendations and Plan:      Continue current plan of care without changes today.  Agree with Dr. Mcneil for PCAD removal in a timely manner - advised pt that this should be discussed at his outpatient dialysis treatment tomorrow.  Counseled on symptoms of recurrent effusion and when to seek emergent care.  Repeat echocardiogram in about 6-weeks, plan for early January 2024.  Follow up with HARRY in 3-months for testing  review.  __________________________________________________________________    Thank you for the opportunity to participate in this pleasant patient's care.    We would be happy to see this patient sooner for any concerns in the meantime.    STEFFANY Vizcarra, CNP   Nurse Practitioner  [facility]    Today's clinic visit entailed:  Review of the result(s) of each unique test - hospital admission records and testing, cardiac testing, multiple echocardiograms reviewed, lab review  The following tests were independently interpreted by me as noted in my documentation: echo  Ordering of each unique test  Prescription drug management    The level of medical decision making during this visit was of moderate complexity.    Orders this Visit:  Orders Placed This Encounter   Procedures    Follow-Up with Cardiology- HARRY    Echocardiogram Limited     Orders Placed This Encounter   Medications    ELIQUIS ANTICOAGULANT 5 MG tablet     Sig: Take 5 mg by mouth 2 times daily     There are no discontinued medications.  Encounter Diagnoses   Name Primary?    Pericardial effusion Yes    ESRD (end stage renal disease) on dialysis (H)     Dyslipidemia     PAF (paroxysmal atrial fibrillation) (H)     Liver transplanted (H)      CURRENT MEDICATIONS:  Current Outpatient Medications   Medication Sig Dispense Refill    acetaminophen (TYLENOL) 325 MG tablet Take 2 tablets (650 mg) by mouth every 8 hours as needed for other (For optimal non-opioid multimodal pain management to improve pain control.)      ciprofloxacin (CIPRO) 250 MG tablet Take 1 tablet (250 mg) by mouth every 24 hours for 90 days 90 tablet 0    ELIQUIS ANTICOAGULANT 5 MG tablet Take 5 mg by mouth 2 times daily      gabapentin (NEURONTIN) 100 MG capsule Take 3 capsules (300 mg) by mouth at bedtime (Patient taking differently: Take 300 mg by mouth at bedtime prn) 30 capsule 2    hydrOXYzine (ATARAX) 25 MG tablet Take 1 tablet (25 mg) by mouth 3 times  daily as needed for itching 90 tablet 0    Lacosamide (VIMPAT) 100 MG TABS tablet Take 1 tablet (100 mg) by mouth every evening 31 tablet 5    lacosamide (VIMPAT) 50 MG TABS tablet Take 50 mg by mouth every morning      melatonin 3 MG tablet Take 1 tablet (3 mg) by mouth At Bedtime 90 tablet 1    midodrine (PROAMATINE) 10 MG tablet Take 10 mg by mouth 3 times daily      midodrine (PROAMATINE) 10 MG tablet Take 10-20 mg by mouth 4 times daily as needed (On dialysis days) On dialysis days - patient takes 1 tablet one hour before dialysis, 1 tablet when he gets there, 1 tablet during dialysis and 1-2 tablets after dialysis. Max 5 tablets      multivitamin RENAL (TRIPHROCAPS) 1 capsule capsule Take 1 capsule by mouth daily 30 capsule 3    rOPINIRole (REQUIP) 0.5 MG tablet Take 1 tablet (0.5 mg) by mouth 2 times daily as needed (restless legs) (Patient taking differently: Take 0.5 mg by mouth 2 times daily as needed (restless legs) PRN) 180 tablet 1    sevelamer carbonate (RENVELA) 800 MG tablet Take 800 mg by mouth 4 times daily With meals and snacks      tacrolimus (GENERIC) 0.5 MG capsule Take 1 capsule (0.5 mg) by mouth 2 times daily for 90 days 180 capsule 0    lactulose (CHRONULAC) 10 GM/15ML solution Take 30 mLs (20 g) by mouth 2 times daily for 60 days (Patient not taking: Reported on 11/16/2023) 3600 mL 0    pantoprazole (PROTONIX) 40 MG EC tablet Take 1 tablet (40 mg) by mouth every morning (before breakfast) for 90 days (Patient not taking: Reported on 11/16/2023) 90 tablet 0     ALLERGIES   No Known Allergies  PAST MEDICAL, SURGICAL, FAMILY, SOCIAL HISTORY:  History was reviewed and updated as needed, see medical record.    Review of Systems:  A 10-point Review Of Systems is otherwise normal except for that which is noted in the HPI and interval summary.    Physical Exam:    Vitals: /74   Pulse 72   Ht 1.829 m (6')   Wt 83 kg (183 lb)   SpO2 96%   BMI 24.82 kg/m    Constitutional: Appears stated  age, well nourished, NAD.  Neck: Supple. Carotid pulses full and equal.   Respiratory: Non-labored. Lungs CTAB.  Cardiovascular: RRR, normal S1 and S2. No M/G/R.  No edema.  GI: Soft, non-distended, non-tender.  Skin: Warm and dry.   Musculoskeletal/Extremities: Symmetrical movement to all extremities.  Neurologic: No gross focal deficits. Alert, awake, and oriented to all spheres.  Psychiatric: Affect appropriate. Mentation normal.    Recent Lab Results:  LIPID RESULTS:  Lab Results   Component Value Date    CHOL 87 10/13/2023    CHOL 161 04/20/2020    HDL 42 10/13/2023    HDL 42 04/20/2020    LDL 32 10/13/2023    LDL 81 04/20/2020    TRIG 65 10/13/2023    TRIG 192 (H) 04/20/2020     LIVER ENZYME RESULTS:  Lab Results   Component Value Date    AST 15 11/01/2023     (H) 04/20/2020    ALT <5 11/01/2023    ALT 72 (H) 04/20/2020     CBC RESULTS:  Lab Results   Component Value Date    WBC 5.6 11/01/2023    WBC 4.0 04/20/2020    RBC 2.35 (L) 11/01/2023    RBC 3.95 (L) 04/20/2020    HGB 7.3 (L) 11/01/2023    HGB 14.3 04/20/2020    HCT 23.4 (L) 11/01/2023    HCT 43.1 04/20/2020     11/01/2023     (H) 04/20/2020    MCH 31.1 11/01/2023    MCH 36.2 (H) 04/20/2020    MCHC 31.2 (L) 11/01/2023    MCHC 33.2 04/20/2020    RDW 16.3 (H) 11/01/2023    RDW 12.7 04/20/2020     (L) 11/01/2023     (L) 04/20/2020     BMP RESULTS:  Lab Results   Component Value Date     (L) 11/01/2023     04/20/2020    POTASSIUM 4.4 11/01/2023    POTASSIUM 3.6 10/05/2023    POTASSIUM 3.4 12/29/2022    POTASSIUM 3.9 04/20/2020    CHLORIDE 93 (L) 11/01/2023    CHLORIDE 103 12/29/2022    CHLORIDE 105 08/05/2020    CHLORIDE 106 04/20/2020    CO2 21 (L) 11/01/2023    CO2 31 12/29/2022    CO2 28 04/20/2020    ANIONGAP 15 11/01/2023    ANIONGAP 3 12/29/2022    ANIONGAP 6 04/20/2020    GLC 94 11/01/2023     (H) 10/26/2023     (H) 12/29/2022    GLC 93 04/20/2020    BUN 67.1 (H) 11/01/2023    BUN 46 (H)  12/29/2022    BUN 23 04/20/2020    CR 7.05 (H) 11/01/2023    CR 1.06 04/20/2020    GFRESTIMATED 8 (L) 11/01/2023    GFRESTIMATED 78 04/20/2020    GFRESTBLACK >90 04/20/2020    KELLY 8.9 11/01/2023    KELLY 9.2 04/20/2020      A1C RESULTS:  Lab Results   Component Value Date    A1C 4.7 08/10/2023    A1C 4.8 09/22/2016     INR RESULTS:  Lab Results   Component Value Date    INR 1.67 (H) 10/27/2023    INR 1.70 (H) 10/26/2023    INR 1.20 (H) 10/07/2019    INR 1.26 (H) 10/17/2017

## 2023-11-16 NOTE — TELEPHONE ENCOUNTER
Patient now has working fistula for dialysis. He has been trying to get his central venous catheter removed.   Patient is hoping to get it removed before Thanksgiving. He is traveling to [place] that day.  Aracely Licea RN

## 2023-11-16 NOTE — PATIENT INSTRUCTIONS
Thank you for your visit with the [facility] today.    Today's Summary     Repeat an echocardiogram at the beginning of the year, January 2024  Follow up in 3-months with me to review echo.    If you have questions or concerns, please do not hesitate to call my nursing support team at [phone].    Scheduling phone number: [phone]  [facility] Clinic Number: [phone]    It was a pleasure seeing you today.     STEFFANY Vizcarra, CNP  Nurse Practitioner  [facility] [place]  November 16, 2023  ________________________________________________________

## 2023-11-16 NOTE — LETTER
11/16/2023    Ki Carter PA-C  [address]  [facility] [address]    RE: Blanco Osborne       Dear Colleague,     I had the pleasure of seeing Blanco Osborne in the [facility].              ~Cardiology Clinic Visit~    Primary Cardiologist: Dr. Abreu  Reason for visit: Hospital follow up     History of Present Illness  Blanco Osborne is a very pleasant 58 year old male with a past medical history notable for Patient is 57 years old with alcoholic cirrhosis status post liver transplant in 2016, type 2 diabetes, chronic immunosuppression, PAF, previous stroke, hypertension, seizure disorder, obstructive sleep apnea on BiPAP following respiratory arrest in November 2022 which ended with end-stage renal disease needing dialysis.        He has been referred to cardiology following recent hospitalization where he was noted to have paroxysmal atrial fibrillation and appropriately started on anticoagulation for stroke prophylaxis, and consulted with Dr. Abreu on 5/4/23.  In the past, he has been seen by Dr. Morales at [facility] cardiology in 2016.      He was hospitalized for 3-months from 1/21/23 - 4/28/23.  He was recovering in long-term care at [facility] and transferred to [facility] on 1/21/2023 for evaluation of loculated pleural effusion.  During this he had acute metabolic encephalopathy with delirium, chronic hepatic encephalopathy, bilateral pleural effusions and acute respiratory failure requiring tracheostomy, ARDS, pneumonia.  He had multispecialty consultation including thoracic surgery, infectious diseases, pulmonology, neurology.  He was treated with antifungal therapy.  Had subacute bacterial peritonitis that resolved.      During all of this he had episodes of paroxysmal atrial fibrillation. But he is chronically anticoagulated on apixaban and currently in sinus rhythm at 65 bpm. He cannot tolerate rate controlling medication such as beta-blocker due to low  resting blood pressure in the 100s systolic.      Echocardiogram in March 2023 showed normal LVEF, no significant valve disease, no obvious vegetations, but with note of a small posterior pericardial effusion without tamponade.  We followed with many serial echocardiograms since the initial effusion was discovered.  With this, the effusion continued to develop, and eventually he returned to [facility] for pericardiocentesis on 9/29/23.    Diagnostics:  11/6/23 Echocardiogram:  Global and regional left ventricular function is normal with an EF of 60-65%.  Global right ventricular function is normal.  Mild aortic insufficiency is present.  IVC diameter <2.1 cm collapsing >50% with sniff suggests a normal RA pressure  of 3 mmHg.  No pericardial effusion is present.     Interval 11/16/23    Patient has no new cardiac concerns to discuss today.  He denies SOB, chest pain or pressure, palpitations, orthopnea, PND.  He is a little lightheaded after dialysis, and they removed 2.5L fluid.  He has had no falls or syncope.  __________________________________________________________________          Assessment and Impression:      1.  Paroxysmal atrial fibrillation.  Currently in sinus rhythm.  Appropriately anticoagulated with apixaban.  Not a candidate for AV slowing drugs due to resting hypotension.  2.  Complex alcoholic liver cirrhosis, status post liver transplant in 2016, chronically immunosuppressed.  3.  End-stage renal disease, dialysis via PCAD on MWF, new AVF.  4.  Type 2 diabetes mellitus.  5.  Significant physical debility secondary to recent prolonged 3-month hospitalization for respiratory failure.  6. Pericardial effusion, resolved per recent echocardiogram.          Recommendations and Plan:      Continue current plan of care without changes today.  Agree with Dr. Mcneil for PCAD removal in a timely manner - advised pt that this should be discussed at his outpatient dialysis treatment tomorrow.  Counseled on symptoms  of recurrent effusion and when to seek emergent care.  Repeat echocardiogram in about 6-weeks, plan for early January 2024.  Follow up with [facility] in 3-months for testing review.  __________________________________________________________________    Thank you for the opportunity to participate in this pleasant patient's care.    We would be happy to see this patient sooner for any concerns in the meantime.    STEFFANY Vizcarra, CNP   Nurse Practitioner  [facility]    Today's clinic visit entailed:  Review of the result(s) of each unique test - hospital admission records and testing, cardiac testing, multiple echocardiograms reviewed, lab review  The following tests were independently interpreted by me as noted in my documentation: echo  Ordering of each unique test  Prescription drug management    The level of medical decision making during this visit was of moderate complexity.    Orders this Visit:  Orders Placed This Encounter   Procedures    Follow-Up with Cardiology- [facility]ARR[facility]    Echocardiogram Limited     Orders Placed This Encounter   Medications    ELIQUIS ANTICOAGULANT 5 MG tablet     Sig: Take 5 mg by mouth 2 times daily     There are no discontinued medications.  Encounter Diagnoses   Name Primary?    Pericardial effusion Yes    ESRD (end stage renal disease) on dialysis (H)     Dyslipidemia     PAF (paroxysmal atrial fibrillation) (H)     Liver transplanted (H)      CURRENT MEDICATIONS:  Current Outpatient Medications   Medication Sig Dispense Refill    acetaminophen (TYLENOL) 325 MG tablet Take 2 tablets (650 mg) by mouth every 8 hours as needed for other (For optimal non-opioid multimodal pain management to improve pain control.)      ciprofloxacin (CIPRO) 250 MG tablet Take 1 tablet (250 mg) by mouth every 24 hours for 90 days 90 tablet 0    ELIQUIS ANTICOAGULANT 5 MG tablet Take 5 mg by mouth 2 times daily      gabapentin (NEURONTIN) 100 MG capsule Take 3 capsules (300 mg) by mouth  at bedtime (Patient taking differently: Take 300 mg by mouth at bedtime prn) 30 capsule 2    hydrOXYzine (ATARAX) 25 MG tablet Take 1 tablet (25 mg) by mouth 3 times daily as needed for itching 90 tablet 0    Lacosamide (VIMPAT) 100 MG TABS tablet Take 1 tablet (100 mg) by mouth every evening 31 tablet 5    lacosamide (VIMPAT) 50 MG TABS tablet Take 50 mg by mouth every morning      melatonin 3 MG tablet Take 1 tablet (3 mg) by mouth At Bedtime 90 tablet 1    midodrine (PROAMATINE) 10 MG tablet Take 10 mg by mouth 3 times daily      midodrine (PROAMATINE) 10 MG tablet Take 10-20 mg by mouth 4 times daily as needed (On dialysis days) On dialysis days - patient takes 1 tablet one hour before dialysis, 1 tablet when he gets there, 1 tablet during dialysis and 1-2 tablets after dialysis. Max 5 tablets      multivitamin RENAL (TRIPHROCAPS) 1 capsule capsule Take 1 capsule by mouth daily 30 capsule 3    rOPINIRole (REQUIP) 0.5 MG tablet Take 1 tablet (0.5 mg) by mouth 2 times daily as needed (restless legs) (Patient taking differently: Take 0.5 mg by mouth 2 times daily as needed (restless legs) PRN) 180 tablet 1    sevelamer carbonate (RENVELA) 800 MG tablet Take 800 mg by mouth 4 times daily With meals and snacks      tacrolimus (GENERIC) 0.5 MG capsule Take 1 capsule (0.5 mg) by mouth 2 times daily for 90 days 180 capsule 0    lactulose (CHRONULAC) 10 GM/15ML solution Take 30 mLs (20 g) by mouth 2 times daily for 60 days (Patient not taking: Reported on 11/16/2023) 3600 mL 0    pantoprazole (PROTONIX) 40 MG EC tablet Take 1 tablet (40 mg) by mouth every morning (before breakfast) for 90 days (Patient not taking: Reported on 11/16/2023) 90 tablet 0     ALLERGIES   No Known Allergies  PAST MEDICAL, SURGICAL, FAMILY, SOCIAL HISTORY:  History was reviewed and updated as needed, see medical record.    Review of Systems:  A 10-point Review Of Systems is otherwise normal except for that which is noted in the HPI and interval  summary.    Physical Exam:    Vitals: /74   Pulse 72   Ht 1.829 m (6')   Wt 83 kg (183 lb)   SpO2 96%   BMI 24.82 kg/m    Constitutional: Appears stated age, well nourished, NAD.  Neck: Supple. Carotid pulses full and equal.   Respiratory: Non-labored. Lungs CTAB.  Cardiovascular: RRR, normal S1 and S2. No M/G/R.  No edema.  GI: Soft, non-distended, non-tender.  Skin: Warm and dry.   Musculoskeletal/Extremities: Symmetrical movement to all extremities.  Neurologic: No gross focal deficits. Alert, awake, and oriented to all spheres.  Psychiatric: Affect appropriate. Mentation normal.    Recent Lab Results:  LIPID RESULTS:  Lab Results   Component Value Date    CHOL 87 10/13/2023    CHOL 161 04/20/2020    HDL 42 10/13/2023    HDL 42 04/20/2020    LDL 32 10/13/2023    LDL 81 04/20/2020    TRIG 65 10/13/2023    TRIG 192 (H) 04/20/2020     LIVER ENZYME RESULTS:  Lab Results   Component Value Date    AST 15 11/01/2023     (H) 04/20/2020    ALT <5 11/01/2023    ALT 72 (H) 04/20/2020     CBC RESULTS:  Lab Results   Component Value Date    WBC 5.6 11/01/2023    WBC 4.0 04/20/2020    RBC 2.35 (L) 11/01/2023    RBC 3.95 (L) 04/20/2020    HGB 7.3 (L) 11/01/2023    HGB 14.3 04/20/2020    HCT 23.4 (L) 11/01/2023    HCT 43.1 04/20/2020     11/01/2023     (H) 04/20/2020    MCH 31.1 11/01/2023    MCH 36.2 (H) 04/20/2020    MCHC 31.2 (L) 11/01/2023    MCHC 33.2 04/20/2020    RDW 16.3 (H) 11/01/2023    RDW 12.7 04/20/2020     (L) 11/01/2023     (L) 04/20/2020     BMP RESULTS:  Lab Results   Component Value Date     (L) 11/01/2023     04/20/2020    POTASSIUM 4.4 11/01/2023    POTASSIUM 3.6 10/05/2023    POTASSIUM 3.4 12/29/2022    POTASSIUM 3.9 04/20/2020    CHLORIDE 93 (L) 11/01/2023    CHLORIDE 103 12/29/2022    CHLORIDE 105 08/05/2020    CHLORIDE 106 04/20/2020    CO2 21 (L) 11/01/2023    CO2 31 12/29/2022    CO2 28 04/20/2020    ANIONGAP 15 11/01/2023    ANIONGAP 3  12/29/2022    ANIONGAP 6 04/20/2020    GLC 94 11/01/2023     (H) 10/26/2023     (H) 12/29/2022    GLC 93 04/20/2020    BUN 67.1 (H) 11/01/2023    BUN 46 (H) 12/29/2022    BUN 23 04/20/2020    CR 7.05 (H) 11/01/2023    CR 1.06 04/20/2020    GFRESTIMATED 8 (L) 11/01/2023    GFRESTIMATED 78 04/20/2020    GFRESTBLACK >90 04/20/2020    KELLY 8.9 11/01/2023    KELLY 9.2 04/20/2020      A1C RESULTS:  Lab Results   Component Value Date    A1C 4.7 08/10/2023    A1C 4.8 09/22/2016     INR RESULTS:  Lab Results   Component Value Date    INR 1.67 (H) 10/27/2023    INR 1.70 (H) 10/26/2023    INR 1.20 (H) 10/07/2019    INR 1.26 (H) 10/17/2017                     Thank you for allowing me to participate in the care of your patient.      Sincerely,     STEFFANY Vizcarra CNP     [facility]  cc:   STEFFANY Larson CNP  [address]  [place] [id number]

## 2023-11-16 NOTE — TELEPHONE ENCOUNTER
Pt is s/p repair of left upper arm brachial to cephalic AVF pseudoaneurysms x2; outflow revision of proximal cephalic vein to brachial vein; mobilization of arteriovenous fistula with ligation of side branches on 8/16/23 with Dr. Mcneil.    Pt and spouse arrived to clinic and I spoke with them directly to clarify the previous note.    Fistula runs are going well and patient is using the fistula.    Pt would like the CVC tunnel catheter pulled.  Pt was instructed at the time of his last OV on 9/21/23 to contact us to schedule to have the tunnel cath pulled once his fistula had been accessed.    I scheduled patient for the next available follow up, patient was going to be out of town for approximately two weeks and is scheduled as followed:  Future Appointments   Date Time Provider Department Center   12/7/2023  9:15 AM Deejay Mcneil MD [facility]     RICKI Robles, RN, [facility] [place]

## 2023-11-16 NOTE — TELEPHONE ENCOUNTER
[facility]    Who is the name of the provider?:  ELICEO PALUMBO   What is the location you see this provider at/preferred location?: [place]  Person calling / Facility: O[facility]  Phone number:  [phone]   Nurse call back needed:  YES     Reason for call:  Ofe(Wife) called stating patient is currently at a cardiology appointment but has some concerns about the renal catheter Dr Palumbo placed. Ofe states davita has not used in a couple weeks and Dr Palumbo informed patient if it is not being used there possibility of clotting. Patient would liketo speak to RN or Dr Palumbo to discuss concerns.    Pharmacy location:   n/a  Outside Imaging: n/a   Can we leave a detailed message on this number?  YES

## 2023-11-17 NOTE — TELEPHONE ENCOUNTER
Ki Carter, WALDO  Cs Triage Im21 hours ago (5:37 PM)     MG  Placed IR referral for him to get this removed.  Can we get him the number to call and schedule? Thanks!     TO WALDO AYALA  Called patient regarding PCP message below. He verbalized understanding but said the fistula is not working again so he will have to wait to have this done. Writer gave number to IR for when he is ready. Patient verbalized understanding.     Lisa Garcia RN on 11/17/2023 at 3:19 PM

## 2023-11-21 ENCOUNTER — HOSPITAL ENCOUNTER (OUTPATIENT)
Dept: CARDIAC REHAB | Facility: CLINIC | Age: 58
Discharge: HOME | End: 2023-11-21
Attending: STUDENT IN AN ORGANIZED HEALTH CARE EDUCATION/TRAINING PROGRAM
Payer: COMMERCIAL

## 2023-11-21 ENCOUNTER — HOSPITAL ENCOUNTER (OUTPATIENT)
Facility: CLINIC | Age: 58
End: 2023-11-21
Payer: COMMERCIAL

## 2023-11-21 LAB — BACTERIA FLD CULT: NO GROWTH

## 2023-11-21 PROCEDURE — 93797 PHYS/QHP OP CAR RHAB WO ECG: CPT

## 2023-11-21 PROCEDURE — 93798 PHYS/QHP OP CAR RHAB W/ECG: CPT

## 2023-11-22 ENCOUNTER — VIRTUAL VISIT (OUTPATIENT)
Dept: PHARMACY | Facility: CLINIC | Age: 58
End: 2023-11-22
Attending: INTERNAL MEDICINE
Payer: COMMERCIAL

## 2023-11-22 DIAGNOSIS — N18.6 ESRD (END STAGE RENAL DISEASE) ON DIALYSIS (H): ICD-10-CM

## 2023-11-22 DIAGNOSIS — R56.9 SEIZURES (H): ICD-10-CM

## 2023-11-22 DIAGNOSIS — I48.91 ATRIAL FIBRILLATION WITH RAPID VENTRICULAR RESPONSE (H): ICD-10-CM

## 2023-11-22 DIAGNOSIS — Z94.4 LIVER TRANSPLANTED (H): Primary | ICD-10-CM

## 2023-11-22 DIAGNOSIS — Z99.2 ESRD (END STAGE RENAL DISEASE) ON DIALYSIS (H): ICD-10-CM

## 2023-11-22 DIAGNOSIS — L29.9 ITCHING: ICD-10-CM

## 2023-11-22 DIAGNOSIS — G25.81 RESTLESS LEGS SYNDROME (RLS): ICD-10-CM

## 2023-11-22 PROCEDURE — 99207 PR NO CHARGE LOS: CPT | Performed by: PHARMACIST

## 2023-11-22 NOTE — Clinical Note
1. No obvious liver toxic medications. Ropinirole and Lacosamide are heavily metabolized in the liver, he is following up with neurology to taper his seizure medications as able. I think this is appropriate. He only uses Ropinirole once weekly PRN for RLS, I think this is appropriate as well.   He does not believe he has cirrhosis, I will include the 12/1 biopsy in the plan of this note if yo chetanould like to review. He denies ascites and HE (not taking Lactulose), but does have occasional itching overnight.

## 2023-11-22 NOTE — PROGRESS NOTES
Medication Therapy Management (MTM) Encounter    ASSESSMENT:                            Medication Adherence/Access: No issues identified    Liver Transplant:    Patient's main concern is the confusion surrounding his liver biopsy during his extended hospital stay last year. The biopsy itself does say he has cirrhosis, but also that it is difficult to make full assessment due to other factors. He has an appointment with Dr. Simms to clear this up. I included a copy of the 12/1 biopsy in the plan.     Pruritus:   Stable.    RLS:   Ropinirole can rarely cause acute liver injury as well as LFT elevations, but as patient has tolerated this for quite some time I would not think it is contributing to any liver issues at the moment. He is using very rarely, and it is heavily metabolized in the liver. Would be reasonable to limit use when he does not need it.     Afib:  Stable.     Seizures:  Patient is working with neurology to reduce his anticonvulsant medications. His only seizures were during his 6 month hospitalization, and states he has had none since then. Lacosamide can increase LFTs for patients, but it is a rarer side effect. Still it is heavily metabolized in the liver and if he does not need this medication I would recommend tapering off. Patient to continue follow-up with Neurology for further dose reductions. I of course defer to neurologies expertise. .     ESRD:   Stable.     PLAN:                            Continue to follow-up with neurology, if appropriate continue tapering Lacosamide as it is heavily processed in the liver. It is up to neurology to decide if this medication is needed or not.  Continue using Ropinirole as needed, it is heavily processed in the liver as well, but you seem to tolerating it at the lower dose.   Follow-up with Dr. Simms to discuss the status of your current liver transplant.  I will request a in person appointment for you on 12/29.    Jerrica Almaraz...  Patient would like a in  "person visit on 12/29 with Dr. Simms, can you change this?     Per the biopsy, 12/1/22:  \"The sample size is adequate but apart from the trichrome and reticulin stains demonstrating evidence of cirrhosis with regenerating nodules, the sample is autolyzed from delayed fixation. There is is no immediate suggestion of acute rejection but autolysis makes it difficult to perform further assessment.\"    \"Patient is a 57-year-old man 6 years s/p OLT (on 9/20/2016), and admitted about 2 weeks ago with PEA cardiac arrest and 20 minutes of CPR, presumed secondary to acute respiratory failure and COPD exacerbation and parainfluenza and strep pneumo pneumonias.  He also has acute renal failure and is dialysis dependent, as well as hyperammonemia probably related to worsening of his underlying liver disease. \"    Follow-up: as needed    SUBJECTIVE/OBJECTIVE:                          Blanco Osborne is a 58 year old male called for a transitions of care visit. He was discharged from [facility] on 11/1 for septic shock.      Reason for visit: General medication check up per Dr. Simms. Patient is frustrated that his biopsy on 12/1/222 was inconclusive, but still everyone references it stating he has cirrhosis. Dr. Simms would like an assessment to remove any possible liver toxic medications.     Allergies/ADRs: Reviewed in chart  Past Medical History: Reviewed in chart  Tobacco: He reports that he has never smoked. He has never used smokeless tobacco.  Alcohol: not currently using  THC/CBD: none    Medication Adherence/Access: Patient uses pill box(es).    Liver Transplant:    Tacrolimus 0.5 mg every morning, 0.5 mg every evening  .   Pt reports no ascites or HE. Mild Tremor.   SBP prophylaxis: Cipro 250mg daily- had infection of the fluid around his heart at most recent hospitalization.   HE Prophylaxis: Lactulose 30 mL twice in the past month. No symptoms of HE per patient. He just took it as it was prescribed for him. He is no longer " using it.   Transplant date: 9/20/16  Tx Coordinator: Charu Herrera MD: Dr. Simms, Using Med Card: Yes  Immunizations: annual flu shot 2023; [id number]:  2016; Prevnar 13: 2018; TDaP:  2021; Shingrix: x1 2023, HBV: 2016 series, no immunity after that, COVID: Primary x 2    Estimated Creatinine Clearance: 13.2 mL/min (A) (based on SCr of 7.05 mg/dL (H)).    Pruritus:   Gabapentin 100-300mg at bedtime for itching.   Hydroxyzine 25mg prn  Itching is well controlled with these medications. Occurs in lower back and ankles.     RLS:   Ropinirole 0.5mg at bedtime prn rarely.   Sx well controlled, takes about once weekly.     Afib:  Eliquis 5mg twice daily for stroke prevention  Patient reports no current medication side effects.    Patient does not have a history of GI bleed.   Patient self-monitors blood pressure.  Home BP monitoring 110/72.   BP Readings from Last 3 Encounters:   11/16/23 110/74   11/02/23 104/69   11/01/23 91/49     Pulse Readings from Last 3 Encounters:   11/16/23 72   11/02/23 81   11/01/23 67     Seizures:  Lacosamide 50mg every morning and 100mg every evening   Seizures occurred during his 6 month hospitalization last year, none since. Following with Neurology for this medication. Tapered off one other medication recently.     ESRD:   M,W,F dialysis.  Takes Renal moris daily.  Sevelamer 800mg 2-4 x daily whenever he eats  Patient's BP drops when he first starts dialysis. Takes Midodrine 10mg 1 hour before dialysis, 10mg at start and another PRN when he leaves if needed. This works well for him. Takes it if he drops 100.   Lab Results   Component Value Date    PHOS 4.0 11/01/2023    PHOS 3.1 10/23/2023    PHOS 6.6 (H) 10/06/2023    PTHI 7 (L) 03/12/2023     Today's Vitals: There were no vitals taken for this visit.  ----------------  Post Discharge Medication Reconciliation Status: discharge medications reconciled, continue medications without change.    I spent 47 minutes with this patient today. All  changes were made via collaborative practice agreement with Dr Simms. A copy of the visit note was provided to the patient's provider(s).    A summary of these recommendations was sent via Inf[employer].    Ismael Welch PharmD  [facility] Pharmacist    Phone: [phone]     Telemedicine Visit Details  Type of service:  Telephone visit  Start Time: 12:38 PM  End Time: 1:25 PM     Medication Therapy Recommendations  No medication therapy recommendations to display

## 2023-11-22 NOTE — PATIENT INSTRUCTIONS
"Recommendations from today's MTM visit:                                                       Continue to follow-up with neurology, if appropriate continue tapering Lacosamide as it is heavily processed in the liver. It is up to neurology to decide if this medication is needed or not.  Continue using Ropinirole as needed, it is heavily processed in the liver as well, but you seem to tolerating it at the lower dose.   Follow-up with Dr. Simms to discuss the status of your current liver transplant.  I will request a in person appointment for you on 12/29.    Follow-up: as needed    It was great speaking with you today.  I value your experience and would be very thankful for your time in providing feedback in our clinic survey. In the next few days, you may receive an email or text message from "[employer] with a link to a survey related to your  clinical pharmacist.\"     To schedule another MTM appointment, please call the clinic directly or you may call the MTM scheduling line at [phone] or toll-free at [phone].     My Clinical Pharmacist's contact information:                                                      Please feel free to contact me with any questions or concerns you have.      Ismael Welch, PharmD  MTM Pharmacist    Phone: [phone]     "

## 2023-12-01 ENCOUNTER — TELEPHONE (OUTPATIENT)
Dept: MEDSURG UNIT | Facility: CLINIC | Age: 58
End: 2023-12-01
Payer: COMMERCIAL

## 2023-12-05 NOTE — PROGRESS NOTES
[facility]    Blanco Osborne returns today to remove his jugular tunneled dialysis catheter.  On chronic hemodialysis.  3/21/2023 left upper arm proximal radial to cephalic fistula with ligation of side branches performed.  Developed 2 pseudoaneurysms and underwent repair of these on 8/15/2023.  Outflow revision to the brachial vein also performed with ligation and mobilization of fistula branches.        10/5/2023 duplex l:well-functioning fistula with excellent outflow volume 1920 mL/min.  Narrowing at 2.6 mm in the mid upper arm segment otherwise widely patent.  Very small pseudoaneurysm noted in the mid fistula measuring 7 x 2 mm.    Recently with extensive 1 month hospitalization at [facility] for other medical problems but now at home.    Dialyzes at the [facility] unit.  They have been unable to consistently use the fistula without issues at the upper arm outflow area that is extravasated mildly.  Still using right jugular tunneled catheters.    Exam: Alert and appropriate.  Here with his wife.   Blood pressure 112/73 right arm.  Pulse 73   right jugular tunneled catheter.  Small maturing left upper arm fistula.  Strong pulse and thrill.   Narrowing is clinically appreciated in upper arm or takes a slight bend.   Resolving axillary extravasation hematoma    I reviewed the images from the fistula duplex from 10/5/2023.  Copies given to patient and his wife for the dialysis unit.    IMPRESSION: Unit is having trouble using the fistula though it appears to be widely patent.  I do not feel the mild narrowing at the mid fistula were advantages of any clinical significance.    We are unable to remove the tunneled catheter today due to the issues with the fistula.  We have marked the areas on the skin which would be the easiest access with a sharpie.    Will schedule him to see me again in 2 weeks and do a fistula duplex and hopefully remove the tunneled catheter at that time.    20  minutes with patient and wife today.    Deejay Mcneil MD

## 2023-12-07 ENCOUNTER — HOSPITAL ENCOUNTER (OUTPATIENT)
Dept: ULTRASOUND IMAGING | Facility: CLINIC | Age: 58
Discharge: HOME | End: 2023-12-07
Attending: NURSE PRACTITIONER
Payer: COMMERCIAL

## 2023-12-07 ENCOUNTER — HOSPITAL ENCOUNTER (OUTPATIENT)
Facility: CLINIC | Age: 58
Discharge: HOME | End: 2023-12-07
Payer: COMMERCIAL

## 2023-12-07 ENCOUNTER — OFFICE VISIT (OUTPATIENT)
Dept: OTHER | Facility: CLINIC | Age: 58
End: 2023-12-07
Attending: SURGERY
Payer: COMMERCIAL

## 2023-12-07 ENCOUNTER — HOSPITAL ENCOUNTER (OUTPATIENT)
Dept: CARDIAC REHAB | Facility: CLINIC | Age: 58
Discharge: HOME | End: 2023-12-07
Attending: STUDENT IN AN ORGANIZED HEALTH CARE EDUCATION/TRAINING PROGRAM
Payer: COMMERCIAL

## 2023-12-07 VITALS — DIASTOLIC BLOOD PRESSURE: 73 MMHG | HEART RATE: 73 BPM | SYSTOLIC BLOOD PRESSURE: 112 MMHG

## 2023-12-07 DIAGNOSIS — K65.2 SBP (SPONTANEOUS BACTERIAL PERITONITIS) (H): ICD-10-CM

## 2023-12-07 DIAGNOSIS — Z99.2 ARTERIOVENOUS FISTULA FOR HEMODIALYSIS IN PLACE, PRIMARY (H): Primary | ICD-10-CM

## 2023-12-07 DIAGNOSIS — T82.898A OTHER SPECIFIED COMPLICATION OF VASCULAR PROSTHETIC DEVICES, IMPLANTS AND GRAFTS, INITIAL ENCOUNTER (H): ICD-10-CM

## 2023-12-07 DIAGNOSIS — D84.9 IMMUNOSUPPRESSION (H): ICD-10-CM

## 2023-12-07 DIAGNOSIS — R18.8 OTHER ASCITES: ICD-10-CM

## 2023-12-07 PROCEDURE — 99213 OFFICE O/P EST LOW 20 MIN: CPT | Performed by: SURGERY

## 2023-12-07 PROCEDURE — 272N000706 US PARACENTESIS WITH ALBUMIN

## 2023-12-07 PROCEDURE — 93798 PHYS/QHP OP CAR RHAB W/ECG: CPT

## 2023-12-07 PROCEDURE — 250N000009 HC RX 250: Performed by: RADIOLOGY

## 2023-12-07 PROCEDURE — G0463 HOSPITAL OUTPT CLINIC VISIT: HCPCS | Mod: 25 | Performed by: SURGERY

## 2023-12-07 RX ORDER — LIDOCAINE HYDROCHLORIDE 10 MG/ML
10 INJECTION, SOLUTION EPIDURAL; INFILTRATION; INTRACAUDAL; PERINEURAL ONCE
Status: COMPLETED | OUTPATIENT
Start: 2023-12-07 | End: 2023-12-07

## 2023-12-07 RX ADMIN — LIDOCAINE HYDROCHLORIDE 10 ML: 10 INJECTION, SOLUTION EPIDURAL; INFILTRATION; INTRACAUDAL; PERINEURAL at 10:17

## 2023-12-07 NOTE — PROGRESS NOTES
[facility]        Patient is here for a  follow up.    Pt is currently taking Eliquis.    /73 (BP Location: Right arm, Patient Position: Chair, Cuff Size: Adult Regular)   Pulse 73     The provider has been notified that the patient has no concerns.     Questions patient would like addressed today are: N/A.    Refills are needed: N/A    Has homecare services and agency name:  Parul Valdez MA

## 2023-12-08 DIAGNOSIS — G40.109 FOCAL EPILEPSY (H): Primary | ICD-10-CM

## 2023-12-08 RX ORDER — LACOSAMIDE 50 MG/1
50 TABLET ORAL EVERY MORNING
Qty: 93 TABLET | Refills: 3 | Status: SHIPPED | OUTPATIENT
Start: 2023-12-08 | End: 2024-04-30

## 2023-12-08 NOTE — NURSING NOTE
12/7/23 Progress notes faxed via [facility] to Deirdre DOBSON 12/8/23 10:43a.RICKI Posadas, RN  [facility]  Office:  [phone] Fax: [phone]

## 2023-12-10 NOTE — TELEPHONE ENCOUNTER
Action January 5, 2024 .8:18 AM MT   Action Taken Sent a request for imaging from .     DIAGNOSIS: Onychomycosis - Patient would also like to be evaluated for neuropathy.   APPOINTMENT DATE: 01/09/2024   NOTES STATUS DETAILS   OFFICE NOTE from referring provider Internal Ki Carter PA-C - [facility]   OFFICE NOTE from other specialist Internal    MEDICATION LIST Internal    IMAGING Complete- IN PACS Internal    HP:  06/06/2023 - RT Foot

## 2023-12-11 LAB
ACID FAST STAIN (ARUP): NORMAL

## 2023-12-12 ENCOUNTER — HOSPITAL ENCOUNTER (OUTPATIENT)
Dept: CARDIAC REHAB | Facility: CLINIC | Age: 58
Discharge: HOME | End: 2023-12-12
Attending: STUDENT IN AN ORGANIZED HEALTH CARE EDUCATION/TRAINING PROGRAM
Payer: COMMERCIAL

## 2023-12-12 PROCEDURE — 93798 PHYS/QHP OP CAR RHAB W/ECG: CPT | Performed by: OCCUPATIONAL THERAPIST

## 2023-12-14 ENCOUNTER — HOSPITAL ENCOUNTER (OUTPATIENT)
Dept: CARDIAC REHAB | Facility: CLINIC | Age: 58
Discharge: HOME | End: 2023-12-14
Attending: STUDENT IN AN ORGANIZED HEALTH CARE EDUCATION/TRAINING PROGRAM
Payer: COMMERCIAL

## 2023-12-14 PROCEDURE — 93798 PHYS/QHP OP CAR RHAB W/ECG: CPT | Performed by: OCCUPATIONAL THERAPIST

## 2023-12-18 ENCOUNTER — NURSE TRIAGE (OUTPATIENT)
Dept: FAMILY MEDICINE | Facility: CLINIC | Age: 58
End: 2023-12-18

## 2023-12-18 ENCOUNTER — OFFICE VISIT (OUTPATIENT)
Dept: PEDIATRICS | Facility: CLINIC | Age: 58
End: 2023-12-18
Payer: COMMERCIAL

## 2023-12-18 ENCOUNTER — ANCILLARY PROCEDURE (OUTPATIENT)
Dept: GENERAL RADIOLOGY | Facility: CLINIC | Age: 58
End: 2023-12-18
Attending: FAMILY MEDICINE
Payer: COMMERCIAL

## 2023-12-18 ENCOUNTER — TELEPHONE (OUTPATIENT)
Dept: TRANSPLANT | Facility: CLINIC | Age: 58
End: 2023-12-18

## 2023-12-18 VITALS
HEART RATE: 62 BPM | TEMPERATURE: 97.4 F | RESPIRATION RATE: 16 BRPM | HEIGHT: 72 IN | SYSTOLIC BLOOD PRESSURE: 94 MMHG | DIASTOLIC BLOOD PRESSURE: 61 MMHG | OXYGEN SATURATION: 99 % | BODY MASS INDEX: 27.06 KG/M2 | WEIGHT: 199.8 LBS

## 2023-12-18 DIAGNOSIS — R05.1 ACUTE COUGH: ICD-10-CM

## 2023-12-18 DIAGNOSIS — Z94.4 LIVER TRANSPLANTED (H): Primary | ICD-10-CM

## 2023-12-18 DIAGNOSIS — Z94.4 LIVER TRANSPLANTED (H): ICD-10-CM

## 2023-12-18 LAB
ALBUMIN SERPL BCG-MCNC: 4.4 G/DL (ref 3.5–5.2)
ALP SERPL-CCNC: 210 U/L (ref 40–150)
ALT SERPL W P-5'-P-CCNC: 13 U/L (ref 0–70)
ANION GAP SERPL CALCULATED.3IONS-SCNC: 14 MMOL/L (ref 7–15)
AST SERPL W P-5'-P-CCNC: 24 U/L (ref 0–45)
BASOPHILS # BLD AUTO: 0 10E3/UL (ref 0–0.2)
BASOPHILS NFR BLD AUTO: 1 %
BILIRUB DIRECT SERPL-MCNC: 0.25 MG/DL (ref 0–0.3)
BILIRUB SERPL-MCNC: 0.6 MG/DL
BUN SERPL-MCNC: 39.2 MG/DL (ref 8–23)
CALCIUM SERPL-MCNC: 9.6 MG/DL (ref 8.6–10)
CHLORIDE SERPL-SCNC: 92 MMOL/L (ref 98–107)
CREAT SERPL-MCNC: 5.13 MG/DL (ref 0.67–1.17)
CRP SERPL-MCNC: 11.65 MG/L
DEPRECATED HCO3 PLAS-SCNC: 32 MMOL/L (ref 22–29)
EGFRCR SERPLBLD CKD-EPI 2021: 12 ML/MIN/1.73M2
EOSINOPHIL # BLD AUTO: 0.3 10E3/UL (ref 0–0.7)
EOSINOPHIL NFR BLD AUTO: 6 %
ERYTHROCYTE [DISTWIDTH] IN BLOOD BY AUTOMATED COUNT: 14.2 % (ref 10–15)
GLUCOSE SERPL-MCNC: 89 MG/DL (ref 70–99)
HCT VFR BLD AUTO: 37.5 % (ref 40–53)
HGB BLD-MCNC: 11.6 G/DL (ref 13.3–17.7)
IMM GRANULOCYTES # BLD: 0 10E3/UL
IMM GRANULOCYTES NFR BLD: 0 %
LYMPHOCYTES # BLD AUTO: 1.4 10E3/UL (ref 0.8–5.3)
LYMPHOCYTES NFR BLD AUTO: 30 %
MCH RBC QN AUTO: 30.2 PG (ref 26.5–33)
MCHC RBC AUTO-ENTMCNC: 30.9 G/DL (ref 31.5–36.5)
MCV RBC AUTO: 98 FL (ref 78–100)
MONOCYTES # BLD AUTO: 0.5 10E3/UL (ref 0–1.3)
MONOCYTES NFR BLD AUTO: 12 %
NEUTROPHILS # BLD AUTO: 2.4 10E3/UL (ref 1.6–8.3)
NEUTROPHILS NFR BLD AUTO: 52 %
PLATELET # BLD AUTO: 110 10E3/UL (ref 150–450)
POTASSIUM SERPL-SCNC: 4.3 MMOL/L (ref 3.4–5.3)
PROCALCITONIN SERPL IA-MCNC: 0.81 NG/ML
PROT SERPL-MCNC: 8.3 G/DL (ref 6.4–8.3)
RBC # BLD AUTO: 3.84 10E6/UL (ref 4.4–5.9)
SODIUM SERPL-SCNC: 138 MMOL/L (ref 135–145)
WBC # BLD AUTO: 4.7 10E3/UL (ref 4–11)

## 2023-12-18 PROCEDURE — 86140 C-REACTIVE PROTEIN: CPT | Performed by: FAMILY MEDICINE

## 2023-12-18 PROCEDURE — 85025 COMPLETE CBC W/AUTO DIFF WBC: CPT | Performed by: FAMILY MEDICINE

## 2023-12-18 PROCEDURE — 84145 PROCALCITONIN (PCT): CPT | Performed by: FAMILY MEDICINE

## 2023-12-18 PROCEDURE — 99214 OFFICE O/P EST MOD 30 MIN: CPT | Performed by: FAMILY MEDICINE

## 2023-12-18 PROCEDURE — 80053 COMPREHEN METABOLIC PANEL: CPT | Performed by: FAMILY MEDICINE

## 2023-12-18 PROCEDURE — 82248 BILIRUBIN DIRECT: CPT | Performed by: FAMILY MEDICINE

## 2023-12-18 PROCEDURE — 71046 X-RAY EXAM CHEST 2 VIEWS: CPT | Mod: TC | Performed by: RADIOLOGY

## 2023-12-18 PROCEDURE — 36415 COLL VENOUS BLD VENIPUNCTURE: CPT | Performed by: FAMILY MEDICINE

## 2023-12-18 NOTE — TELEPHONE ENCOUNTER
Pt's wife called with pt on the line. Pt has a barking cough, nasal congestion for a couple weeks. Pt denies chest pain but admits wheezing but no fever, or SOB at rest. Pt is a liver transplant pt and is immunocompromised.  Pt had dialysis visit today and has 3 litters of fluids output. Pt is fatigued. Pt is scheduled with cardiology for echo tomorrow. Clinic has no in person visits. Pt would need to see UC or ADS for evaluations of symptoms.     Triage huddled with ADS provider. Pt was accepted. Pt is scheduled today at 2:15 PM. Pt verbalized agreement with plan.     Reason for Disposition   Wheezing is present    Additional Information   Negative: Bluish (or gray) lips or face   Negative: SEVERE difficulty breathing (e.g., struggling for each breath, speaks in single words)   Negative: Rapid onset of cough and has hives   Negative: Coughing started suddenly after medicine, an allergic food or bee sting   Negative: Difficulty breathing after exposure to flames, smoke, or fumes   Negative: Sounds like a life-threatening emergency to the triager   Negative: Previous asthma attacks and this feels like asthma attack   Negative: Dry cough (non-productive; no sputum or minimal clear sputum) and within 14 days of COVID-19 Exposure   Negative: MODERATE difficulty breathing (e.g., speaks in phrases, SOB even at rest, pulse 100-120) and still present when not coughing   Negative: Chest pain present when not coughing   Negative: Passed out (i.e., fainted, collapsed and was not responding)   Negative: Patient sounds very sick or weak to the triager    Protocols used: Cough-A-OH

## 2023-12-18 NOTE — PATIENT INSTRUCTIONS
If cough worsens (sputum production, shortness of breath develops, labored breathing/dizzinses, or new fever) please seek help right away

## 2023-12-18 NOTE — PROGRESS NOTES
Assessment & Plan     Acute cough  - CBC with platelets differential  - Basic metabolic panel  - Procalcitonin  - CRP inflammation  - XR Chest 2 Views  - CBC with platelets differential  - Basic metabolic panel  - Procalcitonin  - CRP inflammation    Liver transplanted (H)  - Hepatic panel     Hx of SBP -- is maintained on cipro 250mg daily for this.     No notable changes in CRP and no clear suggestion ( SOB/fever/asymmetric crackles) to suggest pneumonia, CXR does not suggest obvious consolidation. Advised conservative care for viral URI, if new symptoms develop seek help right away. Discussed with his current medications additional use of antibiotics increase risk of drug interactions and agrees benefits does not outweigh risks at this time.     Deejay Doyle MD   [facility]    Rolf Simeon is a 58 year old male who presents to clinic today for the following health issues:  Chief Complaint   Patient presents with    Cough     HPI  Cough present but on shortness of breath -- absent of fever    Hx of sepsis last year from strep pneumo     No reported dizziness/lightheadedness    T2DM, S/p liver transplant 2016 due to alcoholic cirrhosis, seizure disorder, CHRISTIAN, paroxismal a-fib, ESRD ( dialysis MWF), recent pericardial effusion with resolution on echo (11/6/23)]    Has echo scheduled for tomorrow    No present sinus pressure or nasal drainage    Patient Active Problem List    Diagnosis Date Noted    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     Priority: High    Acquired immunocompromised state (H24) 11/19/2022     Priority: High    Sepsis due to Streptococcus pneumoniae with acute hypoxic respiratory failure (H) 11/19/2022     Priority: High    Encounter for therapeutic drug monitoring 11/19/2022     Priority: High    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     Priority: High    Septic shock (H) 10/23/2023     Priority: Medium    Pulmonary infiltrates 10/05/2023     Priority: Medium     Wide-complex tachycardia 10/05/2023     Priority: Medium    Elevated troponin 10/05/2023     Priority: Medium    Atrial fibrillation with rapid ventricular response (H) 10/05/2023     Priority: Medium    Severe sepsis (H) 10/05/2023     Priority: Medium    Multiple subsegmental pulmonary emboli without acute cor pulmonale (H) 10/05/2023     Priority: Medium    Pericardial effusion 09/27/2023     Priority: Medium    Dialysis AV fistula malfunction, sequela 08/15/2023     Priority: Medium    Parapneumonic effusion 01/21/2023     Priority: Medium    Pleural effusion 01/21/2023     Priority: Medium    ESRD (end stage renal disease) on dialysis (H) 01/07/2023     Priority: Medium    Anemia of chronic disease due to ESRD, cirrhosis and hypersplenism 01/07/2023     Priority: Medium    Acute on chronic respiratory failure with hypoxia (H) 12/29/2022     Priority: Medium    History of sudden cardiac arrest, PEA 12/29/2022     Priority: Medium     2 min CPR      Hypotension, unspecified hypotension type 12/27/2022     Priority: Medium    Encephalopathy 12/23/2022     Priority: Medium    PEA (Pulseless electrical activity) (H) 12/23/2022     Priority: Medium    Seizures (H) 12/23/2022     Priority: Medium    Pneumothorax on left 12/16/2022     Priority: Medium    Critical illness myopathy 12/04/2022     Priority: Medium    Hyperammonemia (H24) 11/29/2022     Priority: Medium    Pneumothorax 11/29/2022     Priority: Medium    Embolic stroke (H) 11/20/2022     Priority: Medium    Respiratory arrest (H) 11/12/2022     Priority: Medium    Lactic acidosis 11/12/2022     Priority: Medium    Respiratory acidosis 11/12/2022     Priority: Medium    Acute renal failure, unspecified acute renal failure type (H24) 11/12/2022     Priority: Medium    Immunosuppression (H24) 09/28/2016     Priority: Medium    Liver transplanted (H) 09/21/2016     Priority: Medium    Gastroesophageal reflux disease with esophagitis 03/15/2016     Priority:  Medium     Formatting of this note might be different from the original.  [facility], 3/16.  Varices banded 2016.      Cirrhosis of liver (H) 02/11/2016     Priority: Medium    NAFLD (nonalcoholic fatty liver disease) 02/11/2016     Priority: Medium    SBP (spontaneous bacterial peritonitis) (H) 11/11/2015     Priority: Medium     Lactobacillus      Coagulopathy (H24) 11/09/2015     Priority: Medium    Leukocytosis 11/09/2015     Priority: Medium    Other ascites 05/13/2015     Priority: Medium    Hyperlipidemia 05/13/2015     Priority: Medium    Hypertension 05/13/2015     Priority: Medium    Colon adenoma 08/21/2013     Priority: Medium     Formatting of this note might be different from the original.  8/13 1 adenoma, recheck 2019:      CHRISTIAN on CPAP 03/05/2012     Priority: Medium     Formatting of this note might be different from the original.  PSG 2/12: 142 AHI. 11cm H20      Dyslipidemia 02/07/2012     Priority: Medium    Abnormal liver function test 07/28/2011     Priority: Medium    Umbilical hernia 07/27/2011     Priority: Medium       Current Outpatient Medications   Medication    acetaminophen (TYLENOL) 325 MG tablet    ciprofloxacin (CIPRO) 250 MG tablet    ELIQUIS ANTICOAGULANT 5 MG tablet    gabapentin (NEURONTIN) 100 MG capsule    hydrOXYzine (ATARAX) 25 MG tablet    Lacosamide (VIMPAT) 100 MG TABS tablet    lacosamide (VIMPAT) 50 MG TABS tablet    lactulose (CHRONULAC) 10 GM/15ML solution    melatonin 3 MG tablet    midodrine (PROAMATINE) 10 MG tablet    multivitamin RENAL (TRIPHROCAPS) 1 capsule capsule    sevelamer carbonate (RENVELA) 800 MG tablet    tacrolimus (GENERIC) 0.5 MG capsule     No current facility-administered medications for this visit.           Objective    BP 94/61 (BP Location: Right arm, Patient Position: Sitting, Cuff Size: Adult Regular)   Pulse 62   Temp 97.4  F (36.3  C)   Resp 16   Ht 1.829 m (6')   Wt 90.6 kg (199 lb 12.8 oz)   SpO2 99%   BMI 27.10 kg/m    Physical Exam        CV: RRR no m/r/g  Pulm: no wheezes. On-labored  GEN: normal mentation, no distress    Results for orders placed or performed in visit on 12/18/23   XR Chest 2 Views     Status: None    Narrative    XR CHEST 2 VIEWS 12/18/2023 2:43 PM    HISTORY: cough 2 weeks, complex hx of PNA/sepsis last 2 years, coarse  lungs; Acute cough    COMPARISON: 10/6/2023 and chest CT on 10/31/2023      Impression    IMPRESSION: Linear opacities in the lung bases, likely due to scarring  as seen on the CT of the abdomen and pelvis on 10/21/2023.  Superimposed pneumonia is not excluded. Mild pleural thickening with  calcifications in the right lung base. No pleural effusion or  pneumothorax. Mild cardiomegaly stable. Right IJ dialysis catheter tip  in the low right atrium, stable.    JULIAN PETE MD         SYSTEM ID:  [id number]   Results for orders placed or performed in visit on 12/18/23   Basic metabolic panel     Status: Abnormal   Result Value Ref Range    Sodium 138 135 - 145 mmol/L    Potassium 4.3 3.4 - 5.3 mmol/L    Chloride 92 (L) 98 - 107 mmol/L    Carbon Dioxide (CO2) 32 (H) 22 - 29 mmol/L    Anion Gap 14 7 - 15 mmol/L    Urea Nitrogen 39.2 (H) 8.0 - 23.0 mg/dL    Creatinine 5.13 (H) 0.67 - 1.17 mg/dL    GFR Estimate 12 (L) >60 mL/min/1.73m2    Calcium 9.6 8.6 - 10.0 mg/dL    Glucose 89 70 - 99 mg/dL   Procalcitonin     Status: Abnormal   Result Value Ref Range    Procalcitonin 0.81 (H) <0.50 ng/mL   CRP inflammation     Status: Abnormal   Result Value Ref Range    CRP Inflammation 11.65 (H) <5.00 mg/L   CBC with platelets and differential     Status: Abnormal   Result Value Ref Range    WBC Count 4.7 4.0 - 11.0 10e3/uL    RBC Count 3.84 (L) 4.40 - 5.90 10e6/uL    Hemoglobin 11.6 (L) 13.3 - 17.7 g/dL    Hematocrit 37.5 (L) 40.0 - 53.0 %    MCV 98 78 - 100 fL    MCH 30.2 26.5 - 33.0 pg    MCHC 30.9 (L) 31.5 - 36.5 g/dL    RDW 14.2 10.0 - 15.0 %    Platelet Count 110 (L) 150 - 450 10e3/uL    % Neutrophils 52 %    %  Lymphocytes 30 %    % Monocytes 12 %    % Eosinophils 6 %    % Basophils 1 %    % Immature Granulocytes 0 %    Absolute Neutrophils 2.4 1.6 - 8.3 10e3/uL    Absolute Lymphocytes 1.4 0.8 - 5.3 10e3/uL    Absolute Monocytes 0.5 0.0 - 1.3 10e3/uL    Absolute Eosinophils 0.3 0.0 - 0.7 10e3/uL    Absolute Basophils 0.0 0.0 - 0.2 10e3/uL    Absolute Immature Granulocytes 0.0 <=0.4 10e3/uL   Hepatic panel     Status: Abnormal   Result Value Ref Range    Protein Total 8.3 6.4 - 8.3 g/dL    Albumin 4.4 3.5 - 5.2 g/dL    Bilirubin Total 0.6 <=1.2 mg/dL    Alkaline Phosphatase 210 (H) 40 - 150 U/L    AST 24 0 - 45 U/L    ALT 13 0 - 70 U/L    Bilirubin Direct 0.25 0.00 - 0.30 mg/dL   CBC with platelets differential     Status: Abnormal    Narrative    The following orders were created for panel order CBC with platelets differential.  Procedure                               Abnormality         Status                     ---------                               -----------         ------                     CBC with platelets and d...[[id number]]  Abnormal            Final result                 Please view results for these tests on the individual orders.     Estimated Creatinine Clearance: 19.9 mL/min (A) (based on SCr of 5.13 mg/dL (H)).                  The use of Dragon/[employer] services may have been used to construct the content in this note; any grammatical or spelling errors are non-intentional. Please contact the author of this note directly if you are in need of any clarification.

## 2023-12-18 NOTE — PROGRESS NOTES
{PROVIDER CHARTING PREFERENCE:866724}    Rolf Simeon is a 58 year old, presenting for the following health issues:  No chief complaint on file.      HPI     Shortness of Breath/Breathing Problem  Onset/Duration: 2 weeks  Progression of symptoms: same  Accompanying signs and symptoms:       SOB at rest: No       SOB with activity: No       Pain with inspiration: No       Cough: YES       Pink tinged sputum: No       Sweating: No       Nausea/vomiting: No       Lightheadedness: No       Palpitations: No       Fever/chills: No       Heartburn: No  History   Family history of coagulation disorders: No  Tobacco use: No  Previous similar symptoms: no   Precipitating factors:  Related to eating: No  Better with burping: No  Therapies tried and outcome: Cough drops        Review of Systems   {ROS COMP (Optional):621429}      Objective    There were no vitals taken for this visit.  There is no height or weight on file to calculate BMI.  Physical Exam   {Exam List (Optional):712910}    {Diagnostic Test Results (Optional):679295}    {AMBULATORY ATTESTATION (Optional):936263}

## 2023-12-20 NOTE — PATIENT INSTRUCTIONS
Your tunnel catheter has been removed!    Sutures are to remain in place for 10 days.  The sutures can be clipped and removed by you (or family member) or you can see if your dialysis center will remove them.  You may also return to the clinic and a nurse can remove the sutures for you as well.    We applied bacitracin and gauze to the site.  You can remove this after 24 hours.    If the sutures are irritating and rub on your shirt, you may place another band-aid over it.    It is now ok to shower in 24 hours.

## 2023-12-20 NOTE — PROGRESS NOTES
[facility]    Blanco Osborne close today remove his jugular tunneled dialysis catheter.    -- 3/21/2023 left upper arm possible radial to cephalic aVF    --8/15/2023: Repair of fistula pseudoaneurysms x 2.  Outflow revision of proximal cephalic vein to brachial vein with mobilization of fistula.      10/5/2022 duplex well-functioning fistula.  Mid upper arm narrowing to 2.6 mm and a very small mid fistula 7 x 2 mm pseudoaneurysm.    Overall fistula has been working well though some of the dialysis nurses do have trouble accessing.  Inflow needle was between the elbow and the mid upper arm band where there is a mild stenosis in the outflow just above this.    Exam: Alert and appropriate.  Normal affect.  Here with his wife.   Blood pressure 97/64 right arm.  Pulse 67   Strong pulse within the fistula.   --Mildly narrowed segment at the bend in the mid upper arm marked on the skin.  Good dilatation with compression of proximal and distal portions of the fistula.     Well incorporated right jugular tunneled catheter.    Duplex today revealed fistula is patent.  There is a narrowed area that stable in the mid upper arm at 2.6 mm.  Also mild narrowing of the axilla at 3.1 mm.  Excellent outflow volume= 1126 mL/min.          PROCEDURE: Timeout was called sites were identified in the right shoulder with patient sitting.  ChloraPrep to the area followed by sterile drapes.  1% lidocaine injected field block fashion.  With a 15 blade scalpel the well incorporated skin under the fistula was excised.  Not able to then easily remove the decomp on the catheter and the catheter was entirety with pressure held on the neck.  Outflow area was debrided and approximated with two 4-0 nylon sutures followed by bacitracin ointment and gauze.  Tolerated very well.  No blood loss.    IMPRESSION:     #1.  Well-functioning left upper arm fistula.  Discussed the narrowed area in the mid fistula and the importance of the  tip of the inflow needle being away from the site to prevent extravasation.  Fistula dilates very nicely and access of both needles should be quite easy.    Due to stenosis will repeat duplex in 3 to 4 months of the fistula.    #2.  Removal of right jugular tunneled catheter.  May remove dressing and shower tomorrow.  Wife may remove sutures in approximately 10 days or come to the clinic.    Deejay Mcneil MD  This note was created using Dragon voice recognition software which may result in transcription errors.

## 2023-12-21 ENCOUNTER — OFFICE VISIT (OUTPATIENT)
Dept: OTHER | Facility: CLINIC | Age: 58
End: 2023-12-21
Attending: SURGERY
Payer: COMMERCIAL

## 2023-12-21 ENCOUNTER — HOSPITAL ENCOUNTER (OUTPATIENT)
Dept: ULTRASOUND IMAGING | Facility: CLINIC | Age: 58
Discharge: HOME | End: 2023-12-21
Attending: SURGERY
Payer: COMMERCIAL

## 2023-12-21 VITALS — DIASTOLIC BLOOD PRESSURE: 64 MMHG | SYSTOLIC BLOOD PRESSURE: 97 MMHG | OXYGEN SATURATION: 100 % | HEART RATE: 67 BPM

## 2023-12-21 DIAGNOSIS — T82.898A OTHER SPECIFIED COMPLICATION OF VASCULAR PROSTHETIC DEVICES, IMPLANTS AND GRAFTS, INITIAL ENCOUNTER (H): ICD-10-CM

## 2023-12-21 DIAGNOSIS — I77.0 ARTERIOVENOUS FISTULA (H): ICD-10-CM

## 2023-12-21 DIAGNOSIS — N18.6 ESRD (END STAGE RENAL DISEASE) ON DIALYSIS (H): Primary | ICD-10-CM

## 2023-12-21 DIAGNOSIS — Z99.2 ARTERIOVENOUS FISTULA FOR HEMODIALYSIS IN PLACE, PRIMARY (H): ICD-10-CM

## 2023-12-21 DIAGNOSIS — Z99.2 ESRD (END STAGE RENAL DISEASE) ON DIALYSIS (H): Primary | ICD-10-CM

## 2023-12-21 PROCEDURE — 36589 REMOVAL TUNNELED CV CATH: CPT | Performed by: SURGERY

## 2023-12-21 PROCEDURE — G0463 HOSPITAL OUTPT CLINIC VISIT: HCPCS | Performed by: SURGERY

## 2023-12-21 PROCEDURE — 93990 DOPPLER FLOW TESTING: CPT

## 2023-12-21 PROCEDURE — 99213 OFFICE O/P EST LOW 20 MIN: CPT | Mod: 25 | Performed by: SURGERY

## 2023-12-21 NOTE — PROGRESS NOTES
Patient is here to discuss follow up    BP 97/64 (BP Location: Right arm, Patient Position: Chair, Cuff Size: Adult Regular)   Pulse 67   SpO2 100%     Questions patient would like addressed today are: N/A.    Refills are needed: No    Has homecare services and agency name:  [facility] THOMAS

## 2023-12-28 DIAGNOSIS — I10 HYPERTENSION, UNSPECIFIED TYPE: Primary | ICD-10-CM

## 2023-12-28 RX ORDER — MIDODRINE HYDROCHLORIDE 10 MG/1
TABLET ORAL
Qty: 450 TABLET | Refills: 1 | Status: SHIPPED | OUTPATIENT
Start: 2023-12-28

## 2023-12-29 ENCOUNTER — ALLIED HEALTH/NURSE VISIT (OUTPATIENT)
Dept: TRANSPLANT | Facility: CLINIC | Age: 58
End: 2023-12-29
Attending: INTERNAL MEDICINE
Payer: COMMERCIAL

## 2023-12-29 ENCOUNTER — OFFICE VISIT (OUTPATIENT)
Dept: GASTROENTEROLOGY | Facility: CLINIC | Age: 58
End: 2023-12-29
Attending: INTERNAL MEDICINE
Payer: COMMERCIAL

## 2023-12-29 VITALS
WEIGHT: 193 LBS | DIASTOLIC BLOOD PRESSURE: 75 MMHG | HEART RATE: 81 BPM | BODY MASS INDEX: 26.18 KG/M2 | SYSTOLIC BLOOD PRESSURE: 107 MMHG | OXYGEN SATURATION: 99 %

## 2023-12-29 DIAGNOSIS — Z23 NEED FOR COVID-19 VACCINE: ICD-10-CM

## 2023-12-29 DIAGNOSIS — Z94.4 LIVER REPLACED BY TRANSPLANT (H): Primary | ICD-10-CM

## 2023-12-29 DIAGNOSIS — Z01.818 PRE-TRANSPLANT EVALUATION FOR KIDNEY TRANSPLANT: Primary | ICD-10-CM

## 2023-12-29 PROCEDURE — 91320 SARSCV2 VAC 30MCG TRS-SUC IM: CPT | Performed by: INTERNAL MEDICINE

## 2023-12-29 PROCEDURE — 250N000011 HC RX IP 250 OP 636: Performed by: INTERNAL MEDICINE

## 2023-12-29 PROCEDURE — 90480 ADMN SARSCOV2 VAC 1/ONLY CMP: CPT | Performed by: INTERNAL MEDICINE

## 2023-12-29 PROCEDURE — 99215 OFFICE O/P EST HI 40 MIN: CPT | Performed by: INTERNAL MEDICINE

## 2023-12-29 PROCEDURE — G0463 HOSPITAL OUTPT CLINIC VISIT: HCPCS | Mod: 25 | Performed by: INTERNAL MEDICINE

## 2023-12-29 RX ADMIN — COVID-19 VACCINE, MRNA 30 MCG: 0.05 INJECTION, SUSPENSION INTRAMUSCULAR at 10:07

## 2023-12-29 NOTE — PROGRESS NOTES
Solid Organ Transplant Hepatology Follow-Up Visit:     HISTORY OF PRESENT ILLNESS:   I had the pleasure of seeing Blanco Osborne for followup in the Liver Clinic at the [facility] on December 29, 2023.  Mr. Osborne returns for follow-up now more than 7 years status post liver transplantation for metabolic dysfunction associated steatotic liver disease.    It has been sometime since I saw him last.  In the meantime he had a cardiac arrest and sepsis resulting in a hospitalization for almost 6 months including a stent and rehabilitation.  Unfortunately he developed acute kidney injury that has resulted in him being dialysis dependent since then.  He also did develop some hypotension as well as some ascites and had an episode of SBP back in October here.  It actually is unclear whether he has cirrhosis at this point in time.    At this visit he is feeling fairly well.  He denies any abdominal pain.  He does have some itching which is in part related to dry skin and also some to dialysis.  He has improving fatigue.  He denies any increased abdominal girth and has not required a paracentesis since October.  He has no lower extremity edema.  He has not had any gastrointestinal bleeding.    He denies any fevers or chills, cough or shortness of breath.  He denies any nausea or vomiting and is having about 2 bowel months per day.  His appetite is good and his weight has remained stable.    He is quite interested in kidney transplantation if it is a possibility.    Medications:   Current Outpatient Medications   Medication    acetaminophen (TYLENOL) 325 MG tablet    ciprofloxacin (CIPRO) 250 MG tablet    ELIQUIS ANTICOAGULANT 5 MG tablet    gabapentin (NEURONTIN) 100 MG capsule    hydrOXYzine (ATARAX) 25 MG tablet    Lacosamide (VIMPAT) 100 MG TABS tablet    lacosamide (VIMPAT) 50 MG TABS tablet    lactulose (CHRONULAC) 10 GM/15ML solution    melatonin 3 MG tablet    midodrine (PROAMATINE) 10 MG  tablet    multivitamin RENAL (TRIPHROCAPS) 1 capsule capsule    sevelamer carbonate (RENVELA) 800 MG tablet    tacrolimus (GENERIC) 0.5 MG capsule     No current facility-administered medications for this visit.      Vitals:   There were no vitals taken for this visit.    Physical Exam:   In general he looks quite well. HEENT exam shows no scleral icterus or temporal muscle wasting. Chest is clear. Abdominal exam shows no increase in girth. No masses or tenderness to palpation are present. Liver is 10-11 cm in span without left lobe enlargement. No spleen tip is palpable. Extremity exam shows no edema. Skin exam shows no stigmata of chronic liver disease. Neurologic exam shows no asterixis.     Labs:   Lab Results   Component Value Date     12/18/2023    POTASSIUM 4.3 12/18/2023    CHLORIDE 92 (L) 12/18/2023    ANIONGAP 14 12/18/2023    CO2 32 (H) 12/18/2023    BUN 39.2 (H) 12/18/2023    CR 5.13 (H) 12/18/2023    GFRESTIMATED 12 (L) 12/18/2023    KELLY 9.6 12/18/2023      Lab Results   Component Value Date    WBC 4.7 12/18/2023    HGB 11.6 (L) 12/18/2023    HCT 37.5 (L) 12/18/2023    MCV 98 12/18/2023    MCH 30.2 12/18/2023    MCHC 30.9 (L) 12/18/2023    RDW 14.2 12/18/2023     (L) 12/18/2023     Lab Results   Component Value Date    ALBUMIN 4.4 12/18/2023    ALKPHOS 210 (H) 12/18/2023    AST 24 12/18/2023     Lab Results   Component Value Date    INR 1.67 (H) 10/27/2023       Recent Labs   Lab Test 12/18/23  1436 11/01/23  0634 10/31/23  0655 10/30/23  0459 10/29/23  0745 10/28/23  0726 10/27/23  0506 10/26/23  0457 10/25/23  0440 10/24/23  0537   ALKPHOS 210* 222* 235* 241* 228* 208* 178* 167* 170* 189*   ALT 13 <5 <5 <5 <5 6 <5 <5 <5 <5   AST 24 15 16 15 18 16 14 16 18 21      Imaging:   No images are attached to the encounter.     Assessment/Plan:   IMPRESSION:   My impression is that Mr. Osborne is 7 years status post liver transplantation for metabolic dysfunction associated steatotic liver  disease.  His liver function is excellent at this point in time.  He does have a slightly low platelet count but I see nothing on his CT scan in October that suggest the presence of cirrhosis.  Certainly patients with some degree of fibrosis and in renal failure can develop a fair amount of ascites and I suspect that is the primary reason for that.    He will get another COVID-vaccine today.  He has had a flu vaccine and is otherwise up-to-date on other vaccinations.  He does need screening colonoscopy which he will schedule.  I also recommend he contact his kidney transplant coordinator about beginning evaluation for kidney transplantation.    Otherwise my plan will be to see him back in the clinic in 6 months.    I did spend a total of 40 minutes (on the date of the encounter), including 30 minutes of face-to-face clinic time including counseling. The rest of the time was spent in documentation and review of records.    Thank you very much for allowing me to participate in the care of this patient.  If you have any questions regarding my recommendations, please do not hesitate to contact me.         Juan Simms MD      Professor of Medicine  [employer]      Executive Medical Director, Solid Organ Transplant Program  [facility]

## 2023-12-29 NOTE — NURSING NOTE
No chief complaint on file.    /75   Pulse 81   Wt 87.5 kg (193 lb)   SpO2 99%   BMI 26.18 kg/m    Gricelda Hernández on 12/29/2023 at 9:59 AM

## 2023-12-29 NOTE — LETTER
12/29/2023         RE: Blanco Osborne  [address]  [facility] [address]        Dear Colleague,    Thank you for referring your patient, Blanco Osborne, to the [facility] [place]. Please see a copy of my visit note below.    Solid Organ Transplant Hepatology Follow-Up Visit:     HISTORY OF PRESENT ILLNESS:   I had the pleasure of seeing Blanco Osborne for followup in the Liver Clinic at the [facility] on December 29, 2023.  Mr. Osborne returns for follow-up now more than 7 years status post liver transplantation for metabolic dysfunction associated steatotic liver disease.    It has been sometime since I saw him last.  In the meantime he had a cardiac arrest and sepsis resulting in a hospitalization for almost 6 months including a stent and rehabilitation.  Unfortunately he developed acute kidney injury that has resulted in him being dialysis dependent since then.  He also did develop some hypotension as well as some ascites and had an episode of SBP back in October here.  It actually is unclear whether he has cirrhosis at this point in time.    At this visit he is feeling fairly well.  He denies any abdominal pain.  He does have some itching which is in part related to dry skin and also some to dialysis.  He has improving fatigue.  He denies any increased abdominal girth and has not required a paracentesis since October.  He has no lower extremity edema.  He has not had any gastrointestinal bleeding.    He denies any fevers or chills, cough or shortness of breath.  He denies any nausea or vomiting and is having about 2 bowel months per day.  His appetite is good and his weight has remained stable.    He is quite interested in kidney transplantation if it is a possibility.    Medications:   Current Outpatient Medications   Medication    acetaminophen (TYLENOL) 325 MG tablet    ciprofloxacin (CIPRO) 250 MG tablet    ELIQUIS  ANTICOAGULANT 5 MG tablet    gabapentin (NEURONTIN) 100 MG capsule    hydrOXYzine (ATARAX) 25 MG tablet    Lacosamide (VIMPAT) 100 MG TABS tablet    lacosamide (VIMPAT) 50 MG TABS tablet    lactulose (CHRONULAC) 10 GM/15ML solution    melatonin 3 MG tablet    midodrine (PROAMATINE) 10 MG tablet    multivitamin RENAL (TRIPHROCAPS) 1 capsule capsule    sevelamer carbonate (RENVELA) 800 MG tablet    tacrolimus (GENERIC) 0.5 MG capsule     No current facility-administered medications for this visit.      Vitals:   There were no vitals taken for this visit.    Physical Exam:   In general he looks quite well. HEENT exam shows no scleral icterus or temporal muscle wasting. Chest is clear. Abdominal exam shows no increase in girth. No masses or tenderness to palpation are present. Liver is 10-11 cm in span without left lobe enlargement. No spleen tip is palpable. Extremity exam shows no edema. Skin exam shows no stigmata of chronic liver disease. Neurologic exam shows no asterixis.     Labs:   Lab Results   Component Value Date     12/18/2023    POTASSIUM 4.3 12/18/2023    CHLORIDE 92 (L) 12/18/2023    ANIONGAP 14 12/18/2023    CO2 32 (H) 12/18/2023    BUN 39.2 (H) 12/18/2023    CR 5.13 (H) 12/18/2023    GFRESTIMATED 12 (L) 12/18/2023    KELLY 9.6 12/18/2023      Lab Results   Component Value Date    WBC 4.7 12/18/2023    HGB 11.6 (L) 12/18/2023    HCT 37.5 (L) 12/18/2023    MCV 98 12/18/2023    MCH 30.2 12/18/2023    MCHC 30.9 (L) 12/18/2023    RDW 14.2 12/18/2023     (L) 12/18/2023     Lab Results   Component Value Date    ALBUMIN 4.4 12/18/2023    ALKPHOS 210 (H) 12/18/2023    AST 24 12/18/2023     Lab Results   Component Value Date    INR 1.67 (H) 10/27/2023       Recent Labs   Lab Test 12/18/23  1436 11/01/23  0634 10/31/23  0655 10/30/23  0459 10/29/23  0745 10/28/23  0726 10/27/23  0506 10/26/23  0457 10/25/23  0440 10/24/23  0537   ALKPHOS 210* 222* 235* 241* 228* 208* 178* 167* 170* 189*   ALT 13 <5 <5  <5 <5 6 <5 <5 <5 <5   AST 24 15 16 15 18 16 14 16 18 21      Imaging:   No images are attached to the encounter.     Assessment/Plan:   IMPRESSION:   My impression is that Mr. Osborne is 7 years status post liver transplantation for metabolic dysfunction associated steatotic liver disease.  His liver function is excellent at this point in time.  He does have a slightly low platelet count but I see nothing on his CT scan in October that suggest the presence of cirrhosis.  Certainly patients with some degree of fibrosis and in renal failure can develop a fair amount of ascites and I suspect that is the primary reason for that.    He will get another COVID-vaccine today.  He has had a flu vaccine and is otherwise up-to-date on other vaccinations.  He does need screening colonoscopy which he will schedule.  I also recommend he contact his kidney transplant coordinator about beginning evaluation for kidney transplantation.    Otherwise my plan will be to see him back in the clinic in 6 months.    I did spend a total of 40 minutes (on the date of the encounter), including 30 minutes of face-to-face clinic time including counseling. The rest of the time was spent in documentation and review of records.    Thank you very much for allowing me to participate in the care of this patient.  If you have any questions regarding my recommendations, please do not hesitate to contact me.       Juan Simms MD      Professor of Medicine  [employer]    Executive Medical Director, Solid Organ Transplant Program  [facility]

## 2023-12-29 NOTE — NURSING NOTE
Patient had his CVC removed by Dr. Briscoe on 12/21/23. 2 stitches removed from the site per Dr. Briscoe's note. Patient has a hematoma around insertion site. Hot pack given. Nurse in Dr. Briscoe's clinic, Starla Rod, messaged to inform her of the hematoma.     MANUELA LO RN on 12/29/2023 at 9:03 AM

## 2023-12-30 VITALS
OXYGEN SATURATION: 98 % | SYSTOLIC BLOOD PRESSURE: 119 MMHG | RESPIRATION RATE: 16 BRPM | TEMPERATURE: 97 F | HEART RATE: 61 BPM | DIASTOLIC BLOOD PRESSURE: 70 MMHG

## 2023-12-30 PROCEDURE — 93005 ELECTROCARDIOGRAM TRACING: CPT

## 2023-12-30 PROCEDURE — 99284 EMERGENCY DEPT VISIT MOD MDM: CPT

## 2023-12-31 ENCOUNTER — HOSPITAL ENCOUNTER (EMERGENCY)
Facility: CLINIC | Age: 58
Discharge: HOME | End: 2023-12-31
Attending: EMERGENCY MEDICINE | Admitting: EMERGENCY MEDICINE
Payer: COMMERCIAL

## 2023-12-31 DIAGNOSIS — N18.6 ESRD (END STAGE RENAL DISEASE) ON DIALYSIS (H): ICD-10-CM

## 2023-12-31 DIAGNOSIS — Z99.2 ESRD (END STAGE RENAL DISEASE) ON DIALYSIS (H): ICD-10-CM

## 2023-12-31 LAB
ANION GAP SERPL CALCULATED.3IONS-SCNC: 21 MMOL/L (ref 7–15)
ATRIAL RATE - MUSE: 68 BPM
BUN SERPL-MCNC: 100.4 MG/DL (ref 8–23)
CALCIUM SERPL-MCNC: 9.3 MG/DL (ref 8.6–10)
CHLORIDE SERPL-SCNC: 90 MMOL/L (ref 98–107)
CREAT SERPL-MCNC: 10.6 MG/DL (ref 0.67–1.17)
DEPRECATED HCO3 PLAS-SCNC: 25 MMOL/L (ref 22–29)
DIASTOLIC BLOOD PRESSURE - MUSE: NORMAL MMHG
EGFRCR SERPLBLD CKD-EPI 2021: 5 ML/MIN/1.73M2
GLUCOSE SERPL-MCNC: 105 MG/DL (ref 70–99)
HOLD SPECIMEN: NORMAL
INTERPRETATION ECG - MUSE: NORMAL
P AXIS - MUSE: 55 DEGREES
POTASSIUM SERPL-SCNC: 4.2 MMOL/L (ref 3.4–5.3)
PR INTERVAL - MUSE: 202 MS
QRS DURATION - MUSE: 90 MS
QT - MUSE: 436 MS
QTC - MUSE: 463 MS
R AXIS - MUSE: 28 DEGREES
SODIUM SERPL-SCNC: 136 MMOL/L (ref 135–145)
SYSTOLIC BLOOD PRESSURE - MUSE: NORMAL MMHG
T AXIS - MUSE: 36 DEGREES
VENTRICULAR RATE- MUSE: 68 BPM

## 2023-12-31 PROCEDURE — 80048 BASIC METABOLIC PNL TOTAL CA: CPT | Performed by: EMERGENCY MEDICINE

## 2023-12-31 PROCEDURE — 36415 COLL VENOUS BLD VENIPUNCTURE: CPT | Performed by: EMERGENCY MEDICINE

## 2023-12-31 ASSESSMENT — ACTIVITIES OF DAILY LIVING (ADL): ADLS_ACUITY_SCORE: 35

## 2023-12-31 NOTE — DISCHARGE INSTRUCTIONS
Please call [facility] this morning to see if they can get you in today for dialysis.  If not please keep your appointment for Monday.    Please return to the emergency department if you have shortness of breath, chest pain.    Please continue to restrict your fluids to 1 L/day.

## 2023-12-31 NOTE — ED TRIAGE NOTES
"Pt arrives via triage requesting dialysis. Pt reports he is on a MWF schedule, but missed dialysis yesterday due to doctors appointment. It was rescheduled this morning but pt reports [facility] was \"having troubles with his fistula.\" Pt then took ambulance to [facility] earlier today for hypotension and didn't do dialysis there because \"they didn't have space to do it.\" Labs were drawn earlier today.      Triage Assessment (Adult)       Row Name 12/30/23 2110          Triage Assessment    Airway WDL WDL        Respiratory WDL    Respiratory WDL WDL        Skin Circulation/Temperature WDL    Skin Circulation/Temperature WDL WDL        Cardiac WDL    Cardiac WDL WDL        Peripheral/Neurovascular WDL    Peripheral Neurovascular WDL WDL        Cognitive/Neuro/Behavioral WDL    Cognitive/Neuro/Behavioral WDL WDL                     "

## 2023-12-31 NOTE — ED PROVIDER NOTES
History     Chief Complaint:  Vascular Access Problem       HPI   Blanco Osborne is a 58 year old male presents emergency department with a request of dialysis.  Patient denies any shortness of breath, chest pain, lower extremity edema.  He states that he was supposed to get dialysis on Friday, he did not go to his appointment.  He did go yesterday where he had an episode of hypotension and did not get dialysis.  Patient was seen in the emergency department yesterday, his blood pressure had improved without any intervention.  Nephrology was consulted, no emergent dialysis was recommended at that time and the patient was advised to follow-up on Monday for his scheduled dialysis.  Patient states that he is here today because he thinks he needs emergent dialysis.      Independent Historian:   None - Patient Only    Review of External Notes:   Reviewed patient's ED visit yesterday at [facility].  Nephrology was consulted and the patient was advised to have dialysis on Monday at his scheduled appointment.  Creatinine was 9.88 and potassium was 4.4.      Medications:    acetaminophen (TYLENOL) 325 MG tablet  ciprofloxacin (CIPRO) 250 MG tablet  ELIQUIS ANTICOAGULANT 5 MG tablet  gabapentin (NEURONTIN) 100 MG capsule  hydrOXYzine (ATARAX) 25 MG tablet  Lacosamide (VIMPAT) 100 MG TABS tablet  lacosamide (VIMPAT) 50 MG TABS tablet  lactulose (CHRONULAC) 10 GM/15ML solution  melatonin 3 MG tablet  midodrine (PROAMATINE) 10 MG tablet  multivitamin RENAL (TRIPHROCAPS) 1 capsule capsule  sevelamer carbonate (RENVELA) 800 MG tablet  tacrolimus (GENERIC) 0.5 MG capsule        Past Medical History:    Past Medical History:   Diagnosis Date    Acquired immunocompromised state (H24) 11/19/2022    Acute renal failure, unspecified acute renal failure type (H24) 11/12/2022    Antiplatelet or antithrombotic long-term use     Arrhythmia     Ascites     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    Cancer (H) 2023    Cirrhosis of liver with  ascites (H) 02/11/2016    Coagulopathy (H24)     Critical illness myopathy     Dialysis patient (H24)     Embolic stroke (H) 11/20/2022    Encephalopathy     ESRD (end stage renal disease) on dialysis (H)     Gastroesophageal reflux disease     H/O alcohol abuse     History of blood transfusion     Hyperammonemia (H24)     Hyperlipidemia     Hypertension     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    Insomnia     Lactic acidosis 11/12/2022    Liver replaced by transplant (H) 09/20/2016    NAFLD (nonalcoholic fatty liver disease) 02/11/2016    [facility] on CPAP     Parainfluenza type 1 infection 11/19/2022    Parapneumonic effusion     Pleural effusion     Pneumothorax on left 12/16/2022    Respiratory acidosis 11/12/2022    Respiratory arrest (H) 11/12/2022    Restless legs syndrome (RLS)     SBP (spontaneous bacterial peritonitis) (H)     Seizures (H) 12/2022    Sepsis due to Streptococcus pneumoniae with acute hypoxic respiratory failure (H) 11/19/2022    Stroke, embolic (H) 11/20/2022    Sudden cardiac arrest (H) 12/29/2022    Tubular adenoma 10/2019       Past Surgical History:    Past Surgical History:   Procedure Laterality Date    APPENDECTOMY      BENCH LIVER N/A 09/20/2016    Procedure: BENCH LIVER;  Surgeon: Enoc Crews MD;  Location: [facility] OR    BRONCHOSCOPY FLEXIBLE AND RIGID N/A 12/20/2022    Procedure: BRONCHOSCOPY;  Surgeon: Alena Valenzuela MD;  Location:  GI    COLONOSCOPY  08/06/2013    repeat in 2018    COLONOSCOPY N/A 10/04/2019    Procedure: COLONOSCOPY, WITH POLYPECTOMY AND BIOPSY;  Surgeon: Go Chong MD;  Location: [facility] OR    CREATE FISTULA ARTERIOVENOUS UPPER EXTREMITY Left 03/21/2023    Procedure: LEFT UPPER EXTREMITY ARTERIOVENOUS FISTULA CREATION. LIGATION OF COMPETING VASCULAR BRANCHES- LEFT;  Surgeon: Deejay Mcneil MD;  Location:  OR    CV PERICARDIOCENTESIS N/A 9/29/2023    Procedure: Pericardiocentesis;  Surgeon: Barry Mckeon MD;   Location:  HEART CARDIAC CATH LAB    CV PERICARDIOCENTESIS N/A 10/11/2023    Procedure: Pericardiocentesis;  Surgeon: Ulises Bhakta MD;  Location:  HEART CARDIAC CATH LAB    CV RIGHT HEART CATH MEASUREMENTS RECORDED N/A 10/11/2023    Procedure: Right Heart Catheterization;  Surgeon: Ulises Bhakta MD;  Location:  HEART CARDIAC CATH LAB    HERNIA REPAIR      IR CHEST TUBE PLACEMENT NON-TUNNELED RIGHT  2022    IR CVC TUNNEL PLACEMENT > 5 YRS OF AGE  2022    IR DIALYSIS FISTULOGRAM LEFT  2023    IR GASTROSTOMY TUBE CHANGE  2023    IR GASTROSTOMY TUBE PERCUTANEOUS PLCMNT  2022    IR PARACENTESIS  2022    IR PARACENTESIS  2022    IR PARACENTESIS  2/10/2016    IR PARACENTESIS  2016    IR THORACENTESIS  2022    IR THORACENTESIS  2020    IR THORACENTESIS  08/10/2020    IR TRANSCATHETER BIOPSY  2022    REVISION FISTULA ARTERIOVENOUS UPPER EXTREMITY Left 8/15/2023    Procedure: 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 2; OUTFLOW REVISION CEPHALIC TO BRACHIAL VEIN;  Surgeon: Deejay Mcneil MD;  Location:  OR    TRACHEOSTOMY N/A 2022    Procedure: TRACHEOSTOMY;  Surgeon: Nilesh Jackson MD;  Location:  OR    TRANSPLANT LIVER RECIPIENT  DONOR N/A 2016    Procedure: TRANSPLANT LIVER RECIPIENT  DONOR;  Surgeon: Enoc Crews MD;  Location: [facility] OR        Physical Exam   Patient Vitals for the past 24 hrs:   BP Temp Pulse Resp SpO2   23 2115 119/70 97  F (36.1  C) 61 16 98 %        Physical Exam  General: Well-nourished, resting comfortably when I enter the room  Eyes: Pupils equal, conjunctivae pink no scleral icterus or conjunctival injection  ENT:  Moist mucus membranes  Respiratory:  Lungs clear to auscultation bilaterally, no crackles/rubs/wheezes.  Good air movement  CV: Normal rate and rhythm, no murmurs  GI:  Abdomen soft and non-distended.  No tenderness, guarding or  rebound  Skin: Warm, dry.  No rashes or petechiae.  Fistula in the left upper arm, palpable thrill.  Musculoskeletal: No peripheral edema or calf tenderness  Neuro: Alert and oriented to person/place/time  Psychiatric: Normal affect      ECG results from 12/31/23   EKG 12 lead     Value    Systolic Blood Pressure     Diastolic Blood Pressure     Ventricular Rate 68    Atrial Rate 68    SC Interval 202    QRS Duration 90        QTc 463    P Axis 55    R AXIS 28    T Axis 36    Interpretation ECG      Sinus rhythm  Normal ECG  When compared with ECG of 30-OCT-2023 06:41,  Minimal criteria for Anterior infarct are no longer Present  T wave amplitude has increased in Anterior leads  QT has lengthened  Confirmed by GENERATED REPORT, COMPUTER ([id number]),  Aasen, Bradley ([id number]) on 12/31/2023 3:52:32 AM       *Note: Due to a large number of results and/or encounters for the requested time period, some results have not been displayed. A complete set of results can be found in Results Review.            Imaging:  No orders to display          Laboratory:  Labs Ordered and Resulted from Time of ED Arrival to Time of ED Departure   BASIC METABOLIC PANEL - Abnormal       Result Value    Sodium 136      Potassium 4.2      Chloride 90 (*)     Carbon Dioxide (CO2) 25      Anion Gap 21 (*)     Urea Nitrogen 100.4 (*)     Creatinine 10.60 (*)     GFR Estimate 5 (*)     Calcium 9.3      Glucose 105 (*)         Procedures       Emergency Department Course & Assessments:             Interventions:  Medications - No data to display     Assessments:  On reassessment, the patient is sleeping in the room.  He is not needing any oxygen.  He is not in any acute distress.    Independent Interpretation (X-rays, CTs, rhythm strip):  None    Consultations/Discussion of Management or Tests:  None        Social Determinants of Health affecting care:   None    Disposition:  The patient was discharged to home.     Impression & Plan    CMS  Diagnoses: None    Medical Decision Makin-year-old male with end-stage renal disease presents emergency department requesting emergent dialysis.  Patient reports that he missed his appointment on Friday and needs dialysis.  Patient denies any chest pain, shortness of breath, swelling in his lower extremities.  Patient's vital signs are within acceptable limits, patient is not hypoxic or needing oxygen.  He is not in any acute distress.  Fistula is in his left upper arm, and is buzzing.  EKG is reassuring, no signs of prolonged QT or peaked T waves.  Basic metabolic panel shows a creatinine of 10.6.  Potassium is within acceptable limits.  Labs were done earlier [facility] at [facility], and were very similar.  Nephrology was consulted at that time and the patient was advised to go to his appointment on Monday, no need for emergent dialysis.  Since his labs have not changed significantly, and he is not having any different or increased symptoms, I do believe that the patient can get dialysis on Monday at his scheduled appointment.  I also told him that he could call [facility] today and see if they could fit him in.  Patient feels comfortable with this plan.  Patient is discharged home.      Diagnosis:    ICD-10-CM    1. ESRD (end stage renal disease) on dialysis (H)  N18.6     Z99.2            Discharge Medications:  New Prescriptions    No medications on file          Erica Silver MD  2023   Erica Silver MD Richardson, Elizabeth, MD  23 0621

## 2024-01-01 ENCOUNTER — HOSPITAL ENCOUNTER (INPATIENT)
Facility: CLINIC | Age: 59
LOS: 5 days | Discharge: HOME | DRG: 252 | End: 2024-01-06
Attending: EMERGENCY MEDICINE | Admitting: HOSPITALIST
Payer: COMMERCIAL

## 2024-01-01 DIAGNOSIS — D63.1 ANEMIA DUE TO CHRONIC KIDNEY DISEASE, ON CHRONIC DIALYSIS (H): ICD-10-CM

## 2024-01-01 DIAGNOSIS — Z99.2 ANEMIA DUE TO CHRONIC KIDNEY DISEASE, ON CHRONIC DIALYSIS (H): ICD-10-CM

## 2024-01-01 DIAGNOSIS — T82.838A: Primary | ICD-10-CM

## 2024-01-01 DIAGNOSIS — N19 UREMIA: ICD-10-CM

## 2024-01-01 DIAGNOSIS — Z99.2 ESRD (END STAGE RENAL DISEASE) ON DIALYSIS (H): ICD-10-CM

## 2024-01-01 DIAGNOSIS — N18.6 ESRD (END STAGE RENAL DISEASE) ON DIALYSIS (H): ICD-10-CM

## 2024-01-01 DIAGNOSIS — N18.6 ANEMIA DUE TO CHRONIC KIDNEY DISEASE, ON CHRONIC DIALYSIS (H): ICD-10-CM

## 2024-01-01 DIAGNOSIS — T82.590A MALFUNCTION OF ARTERIOVENOUS DIALYSIS FISTULA, INITIAL ENCOUNTER (H): ICD-10-CM

## 2024-01-01 LAB
ALBUMIN SERPL BCG-MCNC: 4 G/DL (ref 3.5–5.2)
ALP SERPL-CCNC: 178 U/L (ref 40–150)
ALT SERPL W P-5'-P-CCNC: 10 U/L (ref 0–70)
ANION GAP SERPL CALCULATED.3IONS-SCNC: 20 MMOL/L (ref 7–15)
AST SERPL W P-5'-P-CCNC: 17 U/L (ref 0–45)
ATRIAL RATE - MUSE: 64 BPM
BASOPHILS # BLD AUTO: 0 10E3/UL (ref 0–0.2)
BASOPHILS NFR BLD AUTO: 1 %
BILIRUB SERPL-MCNC: 0.4 MG/DL
BUN SERPL-MCNC: 124.5 MG/DL (ref 8–23)
CALCIUM SERPL-MCNC: 9.1 MG/DL (ref 8.6–10)
CHLORIDE SERPL-SCNC: 91 MMOL/L (ref 98–107)
CREAT SERPL-MCNC: 11.96 MG/DL (ref 0.67–1.17)
DEPRECATED HCO3 PLAS-SCNC: 24 MMOL/L (ref 22–29)
DIASTOLIC BLOOD PRESSURE - MUSE: NORMAL MMHG
EGFRCR SERPLBLD CKD-EPI 2021: 4 ML/MIN/1.73M2
EOSINOPHIL # BLD AUTO: 0.2 10E3/UL (ref 0–0.7)
EOSINOPHIL NFR BLD AUTO: 6 %
ERYTHROCYTE [DISTWIDTH] IN BLOOD BY AUTOMATED COUNT: 14.6 % (ref 10–15)
GLUCOSE SERPL-MCNC: 90 MG/DL (ref 70–99)
HCT VFR BLD AUTO: 26.4 % (ref 40–53)
HGB BLD-MCNC: 8.6 G/DL (ref 13.3–17.7)
IMM GRANULOCYTES # BLD: 0 10E3/UL
IMM GRANULOCYTES NFR BLD: 0 %
INTERPRETATION ECG - MUSE: NORMAL
LYMPHOCYTES # BLD AUTO: 1 10E3/UL (ref 0.8–5.3)
LYMPHOCYTES NFR BLD AUTO: 27 %
MCH RBC QN AUTO: 30.1 PG (ref 26.5–33)
MCHC RBC AUTO-ENTMCNC: 32.6 G/DL (ref 31.5–36.5)
MCV RBC AUTO: 92 FL (ref 78–100)
MONOCYTES # BLD AUTO: 0.5 10E3/UL (ref 0–1.3)
MONOCYTES NFR BLD AUTO: 14 %
NEUTROPHILS # BLD AUTO: 1.9 10E3/UL (ref 1.6–8.3)
NEUTROPHILS NFR BLD AUTO: 52 %
NRBC # BLD AUTO: 0 10E3/UL
NRBC BLD AUTO-RTO: 0 /100
P AXIS - MUSE: 50 DEGREES
PLATELET # BLD AUTO: 63 10E3/UL (ref 150–450)
POTASSIUM SERPL-SCNC: 4.7 MMOL/L (ref 3.4–5.3)
PR INTERVAL - MUSE: 198 MS
PROT SERPL-MCNC: 7.4 G/DL (ref 6.4–8.3)
QRS DURATION - MUSE: 88 MS
QT - MUSE: 452 MS
QTC - MUSE: 466 MS
R AXIS - MUSE: 46 DEGREES
RBC # BLD AUTO: 2.86 10E6/UL (ref 4.4–5.9)
SODIUM SERPL-SCNC: 135 MMOL/L (ref 135–145)
SYSTOLIC BLOOD PRESSURE - MUSE: NORMAL MMHG
T AXIS - MUSE: 21 DEGREES
VENTRICULAR RATE- MUSE: 64 BPM
WBC # BLD AUTO: 3.5 10E3/UL (ref 4–11)

## 2024-01-01 PROCEDURE — 36415 COLL VENOUS BLD VENIPUNCTURE: CPT | Performed by: EMERGENCY MEDICINE

## 2024-01-01 PROCEDURE — 93005 ELECTROCARDIOGRAM TRACING: CPT

## 2024-01-01 PROCEDURE — 96374 THER/PROPH/DIAG INJ IV PUSH: CPT

## 2024-01-01 PROCEDURE — 250N000012 HC RX MED GY IP 250 OP 636 PS 637: Performed by: EMERGENCY MEDICINE

## 2024-01-01 PROCEDURE — 99254 IP/OBS CNSLTJ NEW/EST MOD 60: CPT | Mod: 25 | Performed by: INTERNAL MEDICINE

## 2024-01-01 PROCEDURE — 250N000009 HC RX 250: Performed by: INTERNAL MEDICINE

## 2024-01-01 PROCEDURE — 99221 1ST HOSP IP/OBS SF/LOW 40: CPT | Mod: GC | Performed by: SURGERY

## 2024-01-01 PROCEDURE — 99223 1ST HOSP IP/OBS HIGH 75: CPT | Performed by: PHYSICIAN ASSISTANT

## 2024-01-01 PROCEDURE — 85025 COMPLETE CBC W/AUTO DIFF WBC: CPT | Performed by: EMERGENCY MEDICINE

## 2024-01-01 PROCEDURE — 90937 HEMODIALYSIS REPEATED EVAL: CPT

## 2024-01-01 PROCEDURE — 250N000011 HC RX IP 250 OP 636: Performed by: INTERNAL MEDICINE

## 2024-01-01 PROCEDURE — 80053 COMPREHEN METABOLIC PANEL: CPT | Performed by: EMERGENCY MEDICINE

## 2024-01-01 PROCEDURE — 250N000013 HC RX MED GY IP 250 OP 250 PS 637

## 2024-01-01 PROCEDURE — 5A1D70Z PERFORMANCE OF URINARY FILTRATION, INTERMITTENT, LESS THAN 6 HOURS PER DAY: ICD-10-PCS | Performed by: INTERNAL MEDICINE

## 2024-01-01 PROCEDURE — 250N000013 HC RX MED GY IP 250 OP 250 PS 637: Performed by: PHYSICIAN ASSISTANT

## 2024-01-01 PROCEDURE — 99285 EMERGENCY DEPT VISIT HI MDM: CPT

## 2024-01-01 PROCEDURE — 87040 BLOOD CULTURE FOR BACTERIA: CPT | Performed by: EMERGENCY MEDICINE

## 2024-01-01 PROCEDURE — 258N000003 HC RX IP 258 OP 636: Performed by: INTERNAL MEDICINE

## 2024-01-01 PROCEDURE — 250N000013 HC RX MED GY IP 250 OP 250 PS 637: Performed by: INTERNAL MEDICINE

## 2024-01-01 PROCEDURE — 90935 HEMODIALYSIS ONE EVALUATION: CPT | Performed by: INTERNAL MEDICINE

## 2024-01-01 PROCEDURE — P9047 ALBUMIN (HUMAN), 25%, 50ML: HCPCS | Performed by: INTERNAL MEDICINE

## 2024-01-01 PROCEDURE — 120N000001 HC R&B MED SURG/OB

## 2024-01-01 PROCEDURE — 250N000013 HC RX MED GY IP 250 OP 250 PS 637: Performed by: EMERGENCY MEDICINE

## 2024-01-01 RX ORDER — CALCIUM CARBONATE 500 MG/1
1000 TABLET, CHEWABLE ORAL 4 TIMES DAILY PRN
Status: DISCONTINUED | OUTPATIENT
Start: 2024-01-01 | End: 2024-01-06 | Stop reason: HOSPADM

## 2024-01-01 RX ORDER — MIDODRINE HYDROCHLORIDE 5 MG/1
10 TABLET ORAL
Status: DISCONTINUED | OUTPATIENT
Start: 2024-01-01 | End: 2024-01-06 | Stop reason: HOSPADM

## 2024-01-01 RX ORDER — GABAPENTIN 300 MG/1
300 CAPSULE ORAL AT BEDTIME
Status: DISCONTINUED | OUTPATIENT
Start: 2024-01-01 | End: 2024-01-01

## 2024-01-01 RX ORDER — OXYCODONE HYDROCHLORIDE 5 MG/1
5 TABLET ORAL
Status: COMPLETED | OUTPATIENT
Start: 2024-01-01 | End: 2024-01-02

## 2024-01-01 RX ORDER — MIDODRINE HYDROCHLORIDE 5 MG/1
10 TABLET ORAL ONCE
Qty: 1 TABLET | Refills: 0 | Status: COMPLETED | OUTPATIENT
Start: 2024-01-01 | End: 2024-01-01

## 2024-01-01 RX ORDER — GABAPENTIN 300 MG/1
300 CAPSULE ORAL ONCE
Status: COMPLETED | OUTPATIENT
Start: 2024-01-01 | End: 2024-01-01

## 2024-01-01 RX ORDER — SEVELAMER CARBONATE 800 MG/1
800 TABLET, FILM COATED ORAL
Status: DISCONTINUED | OUTPATIENT
Start: 2024-01-01 | End: 2024-01-06 | Stop reason: HOSPADM

## 2024-01-01 RX ORDER — AMOXICILLIN 250 MG
2 CAPSULE ORAL 2 TIMES DAILY PRN
Status: DISCONTINUED | OUTPATIENT
Start: 2024-01-01 | End: 2024-01-06 | Stop reason: HOSPADM

## 2024-01-01 RX ORDER — GABAPENTIN 300 MG/1
300 CAPSULE ORAL AT BEDTIME
Status: DISCONTINUED | OUTPATIENT
Start: 2024-01-02 | End: 2024-01-06 | Stop reason: HOSPADM

## 2024-01-01 RX ORDER — MIDODRINE HYDROCHLORIDE 5 MG/1
10 TABLET ORAL ONCE
Status: COMPLETED | OUTPATIENT
Start: 2024-01-02 | End: 2024-01-02

## 2024-01-01 RX ORDER — ACETAMINOPHEN 325 MG/1
650 TABLET ORAL EVERY 8 HOURS PRN
Status: DISCONTINUED | OUTPATIENT
Start: 2024-01-01 | End: 2024-01-06 | Stop reason: HOSPADM

## 2024-01-01 RX ORDER — B COMPLEX, C NO.20/FOLIC ACID 1 MG
1 CAPSULE ORAL DAILY
Status: DISCONTINUED | OUTPATIENT
Start: 2024-01-02 | End: 2024-01-06 | Stop reason: HOSPADM

## 2024-01-01 RX ORDER — NALOXONE HYDROCHLORIDE 0.4 MG/ML
0.2 INJECTION, SOLUTION INTRAMUSCULAR; INTRAVENOUS; SUBCUTANEOUS
Status: DISCONTINUED | OUTPATIENT
Start: 2024-01-01 | End: 2024-01-06 | Stop reason: HOSPADM

## 2024-01-01 RX ORDER — LACOSAMIDE 100 MG/1
100 TABLET ORAL EVERY EVENING
Status: DISCONTINUED | OUTPATIENT
Start: 2024-01-01 | End: 2024-01-01

## 2024-01-01 RX ORDER — GABAPENTIN 100 MG/1
100 CAPSULE ORAL
Status: COMPLETED | OUTPATIENT
Start: 2024-01-01 | End: 2024-01-01

## 2024-01-01 RX ORDER — LIDOCAINE 40 MG/G
CREAM TOPICAL
Status: DISCONTINUED | OUTPATIENT
Start: 2024-01-01 | End: 2024-01-06

## 2024-01-01 RX ORDER — NALOXONE HYDROCHLORIDE 0.4 MG/ML
0.4 INJECTION, SOLUTION INTRAMUSCULAR; INTRAVENOUS; SUBCUTANEOUS
Status: DISCONTINUED | OUTPATIENT
Start: 2024-01-01 | End: 2024-01-06 | Stop reason: HOSPADM

## 2024-01-01 RX ORDER — TACROLIMUS 0.5 MG/1
0.5 CAPSULE ORAL
Status: DISCONTINUED | OUTPATIENT
Start: 2024-01-01 | End: 2024-01-06 | Stop reason: HOSPADM

## 2024-01-01 RX ORDER — LACOSAMIDE 50 MG/1
100 TABLET ORAL EVERY EVENING
Status: DISCONTINUED | OUTPATIENT
Start: 2024-01-01 | End: 2024-01-06 | Stop reason: HOSPADM

## 2024-01-01 RX ORDER — LACOSAMIDE 50 MG/1
50 TABLET ORAL EVERY MORNING
Status: DISCONTINUED | OUTPATIENT
Start: 2024-01-02 | End: 2024-01-01

## 2024-01-01 RX ORDER — OXYCODONE HYDROCHLORIDE 5 MG/1
5 TABLET ORAL ONCE
Status: COMPLETED | OUTPATIENT
Start: 2024-01-01 | End: 2024-01-01

## 2024-01-01 RX ORDER — SALIVA STIMULANT COMB. NO.3
1 SPRAY, NON-AEROSOL (ML) MUCOUS MEMBRANE 4 TIMES DAILY
Status: DISCONTINUED | OUTPATIENT
Start: 2024-01-01 | End: 2024-01-06 | Stop reason: HOSPADM

## 2024-01-01 RX ORDER — HYDROXYZINE HYDROCHLORIDE 25 MG/1
50 TABLET, FILM COATED ORAL ONCE
Status: COMPLETED | OUTPATIENT
Start: 2024-01-01 | End: 2024-01-01

## 2024-01-01 RX ORDER — CIPROFLOXACIN 250 MG/1
250 TABLET, FILM COATED ORAL EVERY 24 HOURS
Status: DISCONTINUED | OUTPATIENT
Start: 2024-01-01 | End: 2024-01-01

## 2024-01-01 RX ORDER — TACROLIMUS 0.5 MG/1
0.5 CAPSULE ORAL 2 TIMES DAILY
Status: DISCONTINUED | OUTPATIENT
Start: 2024-01-01 | End: 2024-01-01

## 2024-01-01 RX ORDER — AMOXICILLIN 250 MG
1 CAPSULE ORAL 2 TIMES DAILY PRN
Status: DISCONTINUED | OUTPATIENT
Start: 2024-01-01 | End: 2024-01-06 | Stop reason: HOSPADM

## 2024-01-01 RX ORDER — ALBUMIN (HUMAN) 12.5 G/50ML
50 SOLUTION INTRAVENOUS
Status: DISCONTINUED | OUTPATIENT
Start: 2024-01-01 | End: 2024-01-02

## 2024-01-01 RX ORDER — HYDROXYZINE HYDROCHLORIDE 25 MG/1
25 TABLET, FILM COATED ORAL 3 TIMES DAILY PRN
Status: DISCONTINUED | OUTPATIENT
Start: 2024-01-01 | End: 2024-01-06 | Stop reason: HOSPADM

## 2024-01-01 RX ORDER — LACOSAMIDE 50 MG/1
50 TABLET ORAL EVERY MORNING
Status: DISCONTINUED | OUTPATIENT
Start: 2024-01-01 | End: 2024-01-06 | Stop reason: HOSPADM

## 2024-01-01 RX ORDER — CIPROFLOXACIN 250 MG/1
250 TABLET, FILM COATED ORAL DAILY
Status: DISCONTINUED | OUTPATIENT
Start: 2024-01-01 | End: 2024-01-06 | Stop reason: HOSPADM

## 2024-01-01 RX ADMIN — ALBUMIN HUMAN 50 ML: 0.25 SOLUTION INTRAVENOUS at 14:19

## 2024-01-01 RX ADMIN — LACOSAMIDE 100 MG: 50 TABLET, FILM COATED ORAL at 21:35

## 2024-01-01 RX ADMIN — SODIUM CHLORIDE 300 ML: 9 INJECTION, SOLUTION INTRAVENOUS at 14:20

## 2024-01-01 RX ADMIN — HYDROXYZINE HYDROCHLORIDE 50 MG: 25 TABLET ORAL at 12:45

## 2024-01-01 RX ADMIN — APIXABAN 5 MG: 5 TABLET, FILM COATED ORAL at 21:35

## 2024-01-01 RX ADMIN — Medication: at 14:21

## 2024-01-01 RX ADMIN — TACROLIMUS 0.5 MG: 0.5 CAPSULE ORAL at 16:10

## 2024-01-01 RX ADMIN — SODIUM CHLORIDE 250 ML: 9 INJECTION, SOLUTION INTRAVENOUS at 14:20

## 2024-01-01 RX ADMIN — LIDOCAINE HYDROCHLORIDE 0.5 ML: 10 INJECTION, SOLUTION EPIDURAL; INFILTRATION; INTRACAUDAL; PERINEURAL at 14:20

## 2024-01-01 RX ADMIN — OXYCODONE HYDROCHLORIDE 5 MG: 5 TABLET ORAL at 08:14

## 2024-01-01 RX ADMIN — HYDROXYZINE HYDROCHLORIDE 25 MG: 25 TABLET, FILM COATED ORAL at 17:45

## 2024-01-01 RX ADMIN — GABAPENTIN 100 MG: 100 CAPSULE ORAL at 21:45

## 2024-01-01 RX ADMIN — MIDODRINE HYDROCHLORIDE 10 MG: 5 TABLET ORAL at 14:44

## 2024-01-01 RX ADMIN — Medication 1 SPRAY: at 17:38

## 2024-01-01 RX ADMIN — OXYCODONE HYDROCHLORIDE 5 MG: 5 TABLET ORAL at 13:36

## 2024-01-01 RX ADMIN — GABAPENTIN 300 MG: 300 CAPSULE ORAL at 12:44

## 2024-01-01 RX ADMIN — SEVELAMER CARBONATE 800 MG: 800 TABLET, FILM COATED ORAL at 17:36

## 2024-01-01 RX ADMIN — LACOSAMIDE 50 MG: 50 TABLET, FILM COATED ORAL at 17:36

## 2024-01-01 RX ADMIN — SEVELAMER CARBONATE 800 MG: 800 TABLET, FILM COATED ORAL at 17:38

## 2024-01-01 RX ADMIN — MIDODRINE HYDROCHLORIDE 10 MG: 5 TABLET ORAL at 17:36

## 2024-01-01 RX ADMIN — IRON SUCROSE 100 MG: 20 INJECTION, SOLUTION INTRAVENOUS at 14:39

## 2024-01-01 RX ADMIN — CIPROFLOXACIN HYDROCHLORIDE 250 MG: 250 TABLET, FILM COATED ORAL at 16:10

## 2024-01-01 RX ADMIN — ACETAMINOPHEN 650 MG: 325 TABLET, FILM COATED ORAL at 21:45

## 2024-01-01 RX ADMIN — APIXABAN 5 MG: 5 TABLET, FILM COATED ORAL at 16:10

## 2024-01-01 ASSESSMENT — ACTIVITIES OF DAILY LIVING (ADL)
ADLS_ACUITY_SCORE: 35
ADLS_ACUITY_SCORE: 20
ADLS_ACUITY_SCORE: 35
ADLS_ACUITY_SCORE: 20
ADLS_ACUITY_SCORE: 20
ADLS_ACUITY_SCORE: 35

## 2024-01-01 NOTE — PROGRESS NOTES
Potassium   Date Value Ref Range Status   01/01/2024 4.7 3.4 - 5.3 mmol/L Final   12/29/2022 3.4 3.4 - 5.3 mmol/L Final   04/20/2020 3.9 3.4 - 5.3 mmol/L Final     Potassium POCT   Date Value Ref Range Status   10/05/2023 3.6 3.4 - 5.3 mmol/L Final     Hemoglobin   Date Value Ref Range Status   01/01/2024 8.6 (L) 13.3 - 17.7 g/dL Final   04/20/2020 14.3 13.3 - 17.7 g/dL Final     Creatinine   Date Value Ref Range Status   01/01/2024 11.96 (H) 0.67 - 1.17 mg/dL Final   04/20/2020 1.06 0.66 - 1.25 mg/dL Final     Urea Nitrogen   Date Value Ref Range Status   01/01/2024 124.5 (H) 8.0 - 23.0 mg/dL Final   12/29/2022 46 (H) 7 - 30 mg/dL Final   04/20/2020 23 7 - 30 mg/dL Final     Sodium   Date Value Ref Range Status   01/01/2024 135 135 - 145 mmol/L Final     Comment:     Reference intervals for this test were updated on 09/26/2023 to more accurately reflect our healthy population. There may be differences in the flagging of prior results with similar values performed with this method. Interpretation of those prior results can be made in the context of the updated reference intervals.    04/20/2020 139 133 - 144 mmol/L Final     INR   Date Value Ref Range Status   10/27/2023 1.67 (H) 0.85 - 1.15 Final   10/07/2019 1.20 (H) 0.86 - 1.14 Final       DIALYSIS PROCEDURE NOTE  Hepatitis status of previous patient on machine log was checked and verified ok to use with this patients hepatitis status.    Patient dialyzed for 3.5 hrs. on a K2 bath with a net fluid removal of  2.6L.    A BFR of 250 ml/min was obtained via a left avf using 17 gauge needles.        The treatment plan was discussed with Dr. Bradshaw during the treatment.      Total heparin received during the treatment: 0 units.   Needle cannulation sites held x 10 min.       Meds  given: venofer 100 mg, albumin 50ml and midodrine 10mg   Complications: none      Person educated: patient and wife. Knowledge base substantial. Barriers to learning: none. Educated on  procedure via verbal mode. The patient/family verbalized understanding. Pt prefers verbal education style.     ICEBOAT? Timeout performed pre-treatment  I: Patient was identified using 2 identifiers  C:  Consent Signed Yes  E: Equipment preventative maintenance is current and dialysis delivery system OK to use  B: Hepatitis B Surface Antigen: negative; Draw Date: 12/06/23      Hepatitis B Surface Antibody: susceptible; Draw Date: 01/01/2023  O: Dialysis orders present and complete prior to treatment  A: Vascular access verified and assessed prior to treatment  T: Treatment was performed at a clinically appropriate time  ?: Patient was allowed to ask questions and address concerns prior to treatment  See Adult Hemodialysis flowsheet in [email or web address] for further details and post assessment.  Machine water alarm in place and functioning. Transducer pods intact and checked every 15min.   Pt returned via stretcher.  Chlorine/Chloramine water system checked every 4 hours.  Outpatient Dialysis at [facility]    Patient repositioned every 2 hours during the treatment.  Post treatment report given to 6th floor RN, RN regarding 2.6L of fluid removed, last BP of 122/66, and patient pain rating of 3/10.      Please remove patient dressing on AVF and AVG needle sites 24 hours after dialysis. If leaking occurs please apply a Band-Aid.

## 2024-01-01 NOTE — ED PROVIDER NOTES
History     Chief Complaint:  Vascular Access Problem     The history is provided by the patient.      Blanco Osborne is a 58 year old male with history of cirrhosis, ESRD on dialysis, fistula malfunction, and sepsis who presents to the ED for evaluation of dialysis fistula pain. Blanco reports pain and redness to his dialysis fistula in his left arm began 2 days ago. He presented to [facility] ED that day requesting dialysis, as the previous day he was hypotensive and did not receive dialysis. He did not receive dialysis and was discharged to receive dialysis on Monday (today). Since his ED visit he developed some chest pain, shortness of breath, and abdominal distention. Blanco was turned away from dialysis today as his fistula was infiltrated. His last dialysis was 5 days ago. He endorses decreased appetite, sleep, and weakness. He is making some urine. No fevers, chills, night sweats, or edema to lower extremities. He notes he had a CVC 1 week ago.    Independent Historian:   None - Patient Only    Review of External Notes:   Reviewed ED visit from 12/31/2023, as well as visit from 12/30/2023 to [facility].  At [facility], the patient had hypotension, so was sent to the ED could not be dialyzed.    Medications:    Eliquis  Cipro  Neurontin  Vinpat  ProAmatine    Past Medical History:    Aspergillus pneumonia  Sepsis due to streptococcus pneumoniae with acute hypoxic respiratory failure  Cirrhosis of liver  Nonalcoholic fatty liver disease  Liver transplanted  Other ascites  Hyperlipidemia  Hypertension  Leukocytosis  Spontaneous bacterial peritonitis  Respiratory arrest  Lactic acidosis  Embolic stroke  Hyperammonemia  Pneumothorax  PEA  Seizures  Hypotension  ESRD  Anemia  Parapneumonia effusion  Colon adenoma  GERD with esophagitis  CHRISTIAN on CPAP  Umbilical hernia  Dialysis AV fistula malfunction  Pericardial effusion  Pulmonary infiltrates  Atrial fibrillation with RVR  Severe sepsis  Septic  shock  Encephalopathy  Respiratory acidosis  RLS    Past Surgical History:    Appendectomy  Bench liver  Create fistula AV left upper extremity  Pericardiocentesis x2  Hernia repair  Chest tube placement, right  CVC tunnel placement  Dialysis fistulogram left  Gastrostomy tube percutaneous placement  Gastrostomy tube change  Paracentesis x4  Thoracentesis x3  Tracheostomy  Transplant liver recipient  donor    Physical Exam   Patient Vitals for the past 24 hrs:   BP Temp Temp src Pulse Resp SpO2 Height Weight   24 0719 136/77 97.1  F (36.2  C) Temporal 70 16 98 % 1.829 m (6') 90.7 kg (200 lb)      Physical Exam  General: alert, lying comfortably on gurney  HENT: mucous membranes moist  CV: regular rate, regular rhythm  Resp: normal effort, clear throughout, no crackles or wheezing  GI: abdomen soft, mild distention, nontender.  MSK: no bony tenderness  Skin: appropriately warm and dry, scattered ecchymoses across the neck and forearms.  Fistula in the left upper extremity is swollen, erythematous, warm to touch, tender.  No purulent drainage noted.  The patient has slight thrill to the proximal aspect of the fistula, none distally.  The distal aspect of the fistula is firm.  Left hand is warm and well-perfused.  Extremities: no edema, calves non-tender  Neuro: alert, clear speech, oriented  Psych: normal mood and affect      Emergency Department Course   ECG  ECG taken at 0807, ECG read at 0812  Normal sinus rhythm  Low voltage QRS  Cannot rule out anterior infarct, age undetermined  Abnormal ECG   No significant changes as compared to prior, dated 23.  Rate 64 bpm. VT interval 198 ms. QRS duration 88 ms. QT/QTc 452/466 ms. P-R-T axes 50 46 21.     Laboratory:  Labs Ordered and Resulted from Time of ED Arrival to Time of ED Departure   COMPREHENSIVE METABOLIC PANEL - Abnormal       Result Value    Sodium 135      Potassium 4.7      Carbon Dioxide (CO2) 24      Anion Gap 20 (*)     Urea Nitrogen  124.5 (*)     Creatinine 11.96 (*)     GFR Estimate 4 (*)     Calcium 9.1      Chloride 91 (*)     Glucose 90      Alkaline Phosphatase 178 (*)     AST 17      ALT 10      Protein Total 7.4      Albumin 4.0      Bilirubin Total 0.4     CBC WITH PLATELETS AND DIFFERENTIAL - Abnormal    WBC Count 3.5 (*)     RBC Count 2.86 (*)     Hemoglobin 8.6 (*)     Hematocrit 26.4 (*)     MCV 92      MCH 30.1      MCHC 32.6      RDW 14.6      Platelet Count 63 (*)     % Neutrophils 52      % Lymphocytes 27      % Monocytes 14      % Eosinophils 6      % Basophils 1      % Immature Granulocytes 0      NRBCs per 100 WBC 0      Absolute Neutrophils 1.9      Absolute Lymphocytes 1.0      Absolute Monocytes 0.5      Absolute Eosinophils 0.2      Absolute Basophils 0.0      Absolute Immature Granulocytes 0.0      Absolute NRBCs 0.0     BLOOD CULTURE   BLOOD CULTURE     Procedures   None    Emergency Department Course & Assessments:    Interventions:  Medications   oxyCODONE (ROXICODONE) tablet 5 mg (5 mg Oral $Given 1/1/24 0814)     Assessments:  0742 I obtained history and examined the patient as noted above.    Independent Interpretation (X-rays, CTs, rhythm strip):  None    Consultations/Discussion of Management or Tests:  ED Course as of 01/01/24 1032   Mon Jan 01, 2024   0905 I spoke to Dr. Eric; recommends vascular surgery eval to help make plan for access for dialysis.   0919 I spoke with Dr. Carvajal of vascular surgery regarding the patient's history and presentation to the ED today.   0931 I spoke with Dr. Silva of vascular surgery as well.  Given that we are unable to get the appropriate ultrasound today, they are unable to do a full evaluation to determine if the fistula is clotted.  Dr. Silva recommends discussing the case again with Dr. Eric, potentially attempting access the fistula at our facility, and if not possible, obtaining a tunneled dialysis catheter with IR.   0937 I spoke with Dr. Eric again.   1031 I spoke  with Jose Manuel Castillo PA-C for Dr. Noble of the hospitalist team regarding the patient, who accepted the patient for admission.        Social Determinants of Health affecting care:   None    Disposition:  The patient was admitted to the hospital under the care of Dr. Noble.     Impression & Plan    CMS Diagnoses: None    Medical Decision Making:  Blanco Osborne is a 58 year old male with a history of end-stage renal disease, multisystem organ failure from multifactorial pneumonia, spontaneous bacterial peritonitis, liver transplant 2016, presenting today with infiltration of his fistula.  On exam, the patient is afebrile, with normal vitals.  He does not appear volume overloaded on my exam, and is breathing comfortably.  EKG without concerning signs for hyperkalemia, and potassium 4.7 today.  His fistula does appear to be inflamed, but no sign of ischemia to his left arm.  I discussed the patient with vascular surgery, who recommends ultrasound.  Unfortunately, the ultrasound tech capable of performing this test is not available today.  Also discussed the patient with Dr. Eric of nephrology.  At this point, the patient does not need emergent hemodialysis, but has missed 2 dialysis appointments.  He is becoming progressively more uremic.  He will need to be admitted for dialysis, and plan for vascular access is still unresolved.  Options include the dialysis nurse again attempting to access the fistula, versus having a tunneled catheter placed by IR.  This will be determined by the admitting team, including nephrology and vascular surgery.  At this point, no evidence for acute infection.  Patient stable for admission to the floor with cardiac monitoring.    Diagnosis:    ICD-10-CM    1. Malfunction of arteriovenous dialysis fistula, initial encounter (H24)  T82.590A       2. Uremia  N19       3. ESRD (end stage renal disease) on dialysis (H)  N18.6     Z99.2         Scribe Disclosure:  Robin LANDON Hailie, haile  serving as a scribe at 8:56 AM on 1/1/2024 to document services personally performed by Angeline Gomez MD based on my observations and the provider's statements to me.   1/1/2024   Angeline Gomez MD Pepper, Tracy Lynn, MD  01/02/24 1356

## 2024-01-01 NOTE — CONSULTS
RENAL CONSULTATION NOTE    REFERRING MD:  Erica Silver MD     REASON FOR CONSULTATION:  Dialysis AVF infiltration and swelling    HPI:  58 y.o gentleman with ESRD, who was sent to the ER due to L arm swelling after infiltration on Saturday.    Blanco is under my care at [facility] unit.  He had dialysis on Tuesday and Wednesday due to the holiday schedule.   He had an appointment with Dr. Simms on Friday.  Friday dialysis was scheduled for Saturday.  Unfortunately, his AVF was infiltrated on Saturday.  He did not get dialysis on Saturday.  He was sent to the ER, but was unable to be seemed.   Today, he came to [facility], and staff were concern and sent him here.   Our RN here is able to cannulate his access.  He is getting HD tx now.     ROS:  A complete review of systems was performed and is negative except as noted above.    PMH:    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    Cancer (H) 2023    Squamous Cell Cancer- Since resolved    Cirrhosis of liver with ascites (H) 02/11/2016    Coagulopathy (H24)     Critical illness myopathy     Dialysis patient (H24)     DIALYSIS 3X/ WEEK    Embolic stroke (H) 11/20/2022    Encephalopathy     ESRD (end stage renal disease) on dialysis (H)     Gastroesophageal reflux disease     H/O alcohol abuse     History of blood transfusion     Hyperammonemia (H24)     Hyperlipidemia     Hypertension     Patients states that HTN has been resolved    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    Insomnia     Lactic acidosis 11/12/2022    Liver replaced by transplant (H) 09/20/2016    NAFLD (nonalcoholic fatty liver disease) 02/11/2016    CHRISTIAN on CPAP     Parainfluenza type 1 infection 11/19/2022    Parapneumonic effusion      Pleural effusion     Pneumothorax on left 12/16/2022    Respiratory acidosis 11/12/2022    Respiratory arrest (H) 11/12/2022    Restless legs syndrome (RLS)     SBP (spontaneous bacterial peritonitis) (H)     MNGI    Seizures (H) 12/2022    Sepsis due to Streptococcus pneumoniae with acute hypoxic respiratory failure (H) 11/19/2022    Stroke, embolic (H) 11/20/2022    Sudden cardiac arrest (H) 12/29/2022    PEA- 2 min of CPR    Tubular adenoma 10/2019    Large cecal adenoma- due for surveillance colonoscopy in 3 years (10/2022)       PSH:    Past Surgical History:   Procedure Laterality Date    APPENDECTOMY      BENCH LIVER N/A 09/20/2016    Procedure: BENCH LIVER;  Surgeon: Enoc Crews MD;  Location: [facility] OR    BRONCHOSCOPY FLEXIBLE AND RIGID N/A 12/20/2022    Procedure: BRONCHOSCOPY;  Surgeon: Alena Valenzuela MD;  Location:  GI    COLONOSCOPY  08/06/2013    repeat in 2018    COLONOSCOPY N/A 10/04/2019    Procedure: COLONOSCOPY, WITH POLYPECTOMY AND BIOPSY;  Surgeon: Go Chong MD;  Location: [facility] OR    CREATE FISTULA ARTERIOVENOUS UPPER EXTREMITY Left 03/21/2023    Procedure: LEFT UPPER EXTREMITY ARTERIOVENOUS FISTULA CREATION. LIGATION OF COMPETING VASCULAR BRANCHES- LEFT;  Surgeon: Deejay Mcneil MD;  Location:  OR    CV PERICARDIOCENTESIS N/A 9/29/2023    Procedure: Pericardiocentesis;  Surgeon: Barry Mckeon MD;  Location:  HEART CARDIAC CATH LAB    CV PERICARDIOCENTESIS N/A 10/11/2023    Procedure: Pericardiocentesis;  Surgeon: Ulises Bhakta MD;  Location:  HEART CARDIAC CATH LAB    CV RIGHT HEART CATH MEASUREMENTS RECORDED N/A 10/11/2023    Procedure: Right Heart Catheterization;  Surgeon: Ulises Bhakta MD;  Location:  HEART CARDIAC CATH LAB    HERNIA REPAIR      IR CHEST TUBE PLACEMENT NON-TUNNELED RIGHT  12/12/2022    IR CVC TUNNEL PLACEMENT > 5 YRS OF AGE  12/06/2022    IR DIALYSIS FISTULOGRAM LEFT  07/13/2023    IR GASTROSTOMY TUBE  CHANGE  2023    IR GASTROSTOMY TUBE PERCUTANEOUS PLCMNT  2022    IR PARACENTESIS  2022    IR PARACENTESIS  2022    IR PARACENTESIS  2/10/2016    IR PARACENTESIS  2016    IR THORACENTESIS  2022    IR THORACENTESIS  2020    IR THORACENTESIS  08/10/2020    IR TRANSCATHETER BIOPSY  2022    REVISION FISTULA ARTERIOVENOUS UPPER EXTREMITY Left 8/15/2023    Procedure: 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 2; OUTFLOW REVISION CEPHALIC TO BRACHIAL VEIN;  Surgeon: Deejay Mcneil MD;  Location: [facility] OR    TRACHEOSTOMY N/A 2022    Procedure: TRACHEOSTOMY;  Surgeon: Nilesh Jackson MD;  Location: [facility] OR    TRANSPLANT LIVER RECIPIENT  DONOR N/A 2016    Procedure: TRANSPLANT LIVER RECIPIENT  DONOR;  Surgeon: Enoc Crews MD;  Location: [facility] OR       MEDICATIONS:      ALLERGIES:    Allergies as of 2024    (No Known Allergies)       FH:    Family History   Problem Relation Age of Onset    Coronary Artery Disease No family hx of     Cardiomyopathy No family hx of        SH:    Social History     Socioeconomic History    Marital status:      Spouse name: Not on file    Number of children: Not on file    Years of education: Not on file    Highest education level: Not on file   Occupational History    Not on file   Tobacco Use    Smoking status: Never    Smokeless tobacco: Never   Vaping Use    Vaping Use: Never used   Substance and Sexual Activity    Alcohol use: Not Currently     Alcohol/week: 0.0 standard drinks of alcohol    Drug use: No    Sexual activity: Not Currently   Other Topics Concern    Parent/sibling w/ CABG, MI or angioplasty before 65F 55M? Not Asked   Social History Narrative    Not on file     Social Determinants of Health     Financial Resource Strain: Low Risk  (2023)    Financial Resource Strain     Within the past 12 months, have you or your family members you live with been unable to get  utilities (heat, electricity) when it was really needed?: No   Food Insecurity: Low Risk  (11/2/2023)    Food Insecurity     Within the past 12 months, did you worry that your food would run out before you got money to buy more?: No     Within the past 12 months, did the food you bought just not last and you didn t have money to get more?: No   Transportation Needs: Low Risk  (11/2/2023)    Transportation Needs     Within the past 12 months, has lack of transportation kept you from medical appointments, getting your medicines, non-medical meetings or appointments, work, or from getting things that you need?: No   Physical Activity: Not on file   Stress: Not on file   Social Connections: Not on file   Interpersonal Safety: Low Risk  (9/21/2023)    Interpersonal Safety     Do you feel physically and emotionally safe where you currently live?: Yes     Within the past 12 months, have you been hit, slapped, kicked or otherwise physically hurt by someone?: No     Within the past 12 months, have you been humiliated or emotionally abused in other ways by your partner or ex-partner?: No   Housing Stability: Low Risk  (11/2/2023)    Housing Stability     Do you have housing? : Yes     Are you worried about losing your housing?: No       PHYSICAL EXAM:    /76   Pulse 66   Temp 97.1  F (36.2  C) (Temporal)   Resp 16   Ht 1.829 m (6')   Wt 90.7 kg (200 lb)   SpO2 98%   BMI 27.12 kg/m    GENERAL: pleasant, alert, NAD  HEENT:  Normocephalic. No gross abnormalities.  Pupils equal.  MMM.   CV: RRR, +murmurs, no clicks, gallops, or rubs, no edema,  RESP: Clear bilaterally with good efforts. No wheezes or crackles.  GI: Abdomen obese, soft, NT  MUSCULOSKELETAL: extremities nl - no gross deformities noted. No edema  NEURO:  Awake, alert and conversing normally  PSYCH: mood good, affect appropriate  LYMPH: No palpable ant/post cervical     LABS:      CBC RESULTS:     Recent Labs   Lab 01/01/24  0813   WBC 3.5*   RBC 2.86*    HGB 8.6*   HCT 26.4*   PLT 63*       BMP RESULTS:  Recent Labs   Lab 01/01/24  0813 12/31/23  0149    136   POTASSIUM 4.7 4.2   CHLORIDE 91* 90*   CO2 24 25   .5* 100.4*   CR 11.96* 10.60*   GLC 90 105*   KELLY 9.1 9.3       INRNo lab results found in last 7 days.     DIAGNOSTICS:  Reviewed    A/P: 58 y.o man with ESRD, admitted for AVF dysfunction.     # ESRD:   -SLP under my care   -MWF  # L AVF: infiltrated on Saturday. Unable to cannulate today but with 4 needles attempted   -I reviewed Dr. Mcneil's note from 12/21   -Vascular to re-evaluate  # Anemia: Mircera and Venover  # Intra-dialytic hypotension: + midodrine    Plan:  # HD again tomorrow to optimize UF  # Vascular to evaluate AVF.   # TDC if we can cannulate access tomorrow

## 2024-01-01 NOTE — PHARMACY-ADMISSION MEDICATION HISTORY
Pharmacist Admission Medication History    Admission medication history is complete. The information provided in this note is only as accurate as the sources available at the time of the update.    Information Source(s): Patient via in-person    Pertinent Information:     Changes made to PTA medication list:  Added: None  Deleted: None  Changed: None    Medication Affordability:       Medication History Completed By: Antonieta Duron ESTRELLITA 1/1/2024 8:39 AM    PTA Med List   Medication Sig Last Dose    acetaminophen (TYLENOL) 325 MG tablet Take 2 tablets (650 mg) by mouth every 8 hours as needed for other (For optimal non-opioid multimodal pain management to improve pain control.)     ciprofloxacin (CIPRO) 250 MG tablet Take 1 tablet (250 mg) by mouth every 24 hours for 90 days 12/31/2023    ELIQUIS ANTICOAGULANT 5 MG tablet Take 5 mg by mouth 2 times daily 12/31/2023 at pm    gabapentin (NEURONTIN) 100 MG capsule Take 3 capsules (300 mg) by mouth at bedtime 12/31/2023 at pm    hydrOXYzine (ATARAX) 25 MG tablet Take 1 tablet (25 mg) by mouth 3 times daily as needed for itching     Lacosamide (VIMPAT) 100 MG TABS tablet Take 1 tablet (100 mg) by mouth every evening 12/31/2023    lacosamide (VIMPAT) 50 MG TABS tablet Take 1 tablet (50 mg) by mouth every morning 12/31/2023    melatonin 3 MG tablet Take 1 tablet (3 mg) by mouth At Bedtime (Patient taking differently: Take 3 mg by mouth nightly as needed) 12/31/2023    midodrine (PROAMATINE) 10 MG tablet TAKE 1 TABLET 3 X DAILY. ON DIALYSIS DAYS(M, W, F) TAKE 2 EXTRA TABLETS 1 HOUR BEFORE, 1 TABLET WHEN YOU ARRIVE, AND 1 TABLET DURING DIALYSIS 12/29/2023    multivitamin RENAL (TRIPHROCAPS) 1 capsule capsule Take 1 capsule by mouth daily 12/31/2023    sevelamer carbonate (RENVELA) 800 MG tablet Take 800 mg by mouth 4 times daily With meals and snacks 12/31/2023 at pm    tacrolimus (GENERIC) 0.5 MG capsule Take 1 capsule (0.5 mg) by mouth 2 times daily for 90 days 12/31/2023 at  pm

## 2024-01-01 NOTE — PROGRESS NOTES
RECEIVING UNIT ED HANDOFF REVIEW    ED Nurse Handoff Report was reviewed by: Jonelle Box RN on January 1, 2024 at 11:40 AM

## 2024-01-01 NOTE — ED NOTES
[facility]  ED Nurse Handoff Report    ED Chief complaint: Vascular Access Problem      ED Diagnosis:   Final diagnoses:   None       Code Status: Full Code    Allergies: No Known Allergies    Patient Story: Blanco Osborne is a 58 year old male with history of cirrhosis, ESRD on dialysis, fistula malfunction, and sepsis who presents to the ED for evaluation of dialysis fistula pain. Blanco reports pain and redness to his dialysis fistula in his left arm began 2 days ago. He presented to [facility] ED that day requesting dialysis, as the previous day he was hypotensive and did not receive dialysis. Since his ED visit he developed some chest pain, shortness of breath, and abdominal distention. Blanco was turned away from dialysis today as his fistula was infiltrated. His last dialysis was 5 days ago. He endorses decreased appetite, sleep, and weakness. He is making some urine. No fevers, chills, night sweats, or edema to lower extremities.          Treatments and/or interventions provided: IV/labs/pain control  Patient's response to treatments and/or interventions: good    To be done/followed up on inpatient unit:  dialysis    Does this patient have any cognitive concerns?:  none    Activity level - Baseline/Home:  Independent  Activity Level - Current:   Independent    Patient's Preferred language: English   Needed?: No    Isolation: None  Infection: Not Applicable  Patient tested for COVID 19 prior to admission: NO  Bariatric?: No    Vital Signs:   Vitals:    01/01/24 0719   BP: 136/77   Pulse: 70   Resp: 16   Temp: 97.1  F (36.2  C)   TempSrc: Temporal   SpO2: 98%   Weight: 90.7 kg (200 lb)   Height: 1.829 m (6')       Cardiac Rhythm:     Was the PSS-3 completed:   Yes  What interventions are required if any?               Family Comments: none  OBS brochure/video discussed/provided to patient/family: No              Name of person given brochure if not patient: na               Relationship to patient: na    For the majority of the shift this patient's behavior was Green.   Behavioral interventions performed were na.    ED NURSE PHONE NUMBER: [phone]

## 2024-01-01 NOTE — ED TRIAGE NOTES
Patient went to dialysis this AM and they told him to go to the ED as his fistula was infiltrated.  Were not able to access or dialyze.      Triage Assessment (Adult)       Row Name 01/01/24 0718          Triage Assessment    Airway WDL WDL        Respiratory WDL    Respiratory WDL WDL        Skin Circulation/Temperature WDL    Skin Circulation/Temperature WDL WDL        Cardiac WDL    Cardiac WDL WDL        Peripheral/Neurovascular WDL    Peripheral Neurovascular WDL WDL        Cognitive/Neuro/Behavioral WDL    Cognitive/Neuro/Behavioral WDL WDL

## 2024-01-01 NOTE — H&P
[facility]  History and Physical - Hospitalist Service     Date of Admission:  1/1/2024  PRIMARY CARE PROVIDER: Ki Carter      Assessment & Plan   Blanco TARYN Osborne is a 58 year old male , with a PMH ESRD on HD w L UE fistula and recently removed Jungular Tunneled cath, liver cirrhosis s/p transplant, portal hypertension, SBP, PE, A-fib, on DOAC, seizures, CHRISTIAN not on CPAP, who was admitted on 1/1/2024., for urgent hemodialysis and need for fistula reevaluation vs new central access need...    Discussed with ED MD-presented to ED for evaluation of dialysis fistula with pain.  Last HD was 5 days ago.  Has had 2 ED visits since w request for hemodialysis.  Deferred till today.  Presented to outpatient hemodialysis today, they did not feel comfortable accessing sent to the ED.  Admitted for  r/o rule out fistula infiltrated versus extravasated, versus possible need for repeat placement of Tunneled cath      Principal Problem:    ESRD (end stage renal disease) on dialysis (H)  Active Problems:    Uremia    Malfunction of arteriovenous dialysis fistula, initial encounter (H24)    ESRD on HD, with missed HD  Fistual complication  Hx left AV fistula pseudoaneurysm repair, 8/2023, 12/21/23  HTN w hx chronic hypotension, on midodrine    ESRD . Here w fistula access concern and missed HD,  At this time using fistula which has had multiple past access issues noted in mid LUE AV fistula stenosis per Dr. Mendoza.  follows w Dr Bradshaw for nephrology through the  of .He dialyzes at [facility] . pt last completed HD was 12/27/23, unsuccessful HD attempt 12/30 was aborted due to hypotension, evaluated at  [facility], and later same day also with [facility] ED, renal aware, urgent dialysis deferred. presented 1/1/2024 outpatient HD but fistula access declined by hemodialysis site and sent to ED.  Patient endorsing MALDONADO x 2-3 days with volume overload sx.  Mild weakness, with uremic signs including   (baseline 39), creatinine 11.96,  - admit inpt, expect possible fistula revision versus need for central access.  - Renal consulted- ED provider discussed  - Hemodialysis pending today  - Vasc consulted re fistula  - Patient did take PTA Midodrine - but 6 hour ahead of current dialysis session   - Continue renal multivitamin  - Continue PTA sevelamer 800mg QID  - Blood cultures were collected by ED staff on arrival.  - no ABX indicated  - AM CMP, CBC    Hx Liver transplant 2016 with recurrent cirrhosis with evidence of portal hypertension and ascites   Hx Spontaneous Bacterial Peritonitis  11/1/23  - per last GI note, s/p 2016 liver transplantation for metabolic dysfunction associated steatotic liver disease . Saw GI last month. LFTs ow likely mild cirrhosis .  Patient without any abdominal distention, denies any fevers chills or infectious symptoms.   No   - PT HD as above  -Resume PTA prophylactic ciprofloxacin 250mg daily (renally dosed)   -Hepatology follow up outpatient as scheduled with Dr. Simms     Hx DVT, PE, dx 10/5/2023   Atrial fibrillation on Eliquis  Hx AF w wide complex tachycardia 10/2023 admit, w failed cardioversion x 2.  Heart rate sinus on arrival.  No RVR signs.  - PTA eliquis twice daily resumed  - No PTA beta-blocker secondary to hypotension.    Hx completed embolic stroke, 11/2022    - PTA DOAC (no ASA). PTA statin    Chronic anemia-due to anemia of chronic disease  Hemoglobin is baseline 8-9. Hemoglobin on admit 8.6 w MCV 92, w suspected hemodilution from missed HD.  No acute bleeding.  -CBC QD     Chronic thrombocytopenia  avg Platelet ~120s, on admit 63 (w likely hemodilution from missed HD)  -  CBC QD    Seizure disorder  - continue Vimpat    Hx Recurrent pericardial effusion   Past hospitalizations reviewed,12/18 last chest x-ray reviewed.  -If any worsening breathing consider updated CXR.    Generalized anxiety disorder  -PTA gabapentin 300 at bedtime  -PTA as needed  hydroxyzine    Restless leg syndrome  -PTA ropinirole only used occasionally, not helpful  -PTA as needed gabapentin helps     GERD  -Continue PPI     Hx Moderate protein-energy malnutrition     Consider repeat Dietary consult    Probable CHRISTIAN-  NOT on CPAP,   --Per patient's wife, he had a sleep study about 10 years ago but does not have any CPAP or BiPAP at home.  Patient confirms he does not have CPAP and has not yet been able to schedule the sleep study.  Thus untreated.  -Monitor for nocturnal hypoxia.  -needs to schedule sleep study as outpatient after discharge    History of prolonged hospitalization N for CAP (*see 10/5/23) d/c summary, had prolonged TCU stay and now able to walk gain.      Clinically Significant Risk Factors Present on Admission             # Anion Gap Metabolic Acidosis: Highest Anion Gap = 21 mmol/L in last 2 days, will monitor and treat as appropriate   # Drug Induced Coagulation Defect: home medication list includes an anticoagulant medication  # Thrombocytopenia: Lowest platelets = 63 in last 2 days, will monitor for bleeding   # Hypertension: Noted on problem list      # Overweight: Estimated body mass index is 27.12 kg/m  as calculated from the following:    Height as of this encounter: 1.829 m (6').    Weight as of this encounter: 90.7 kg (200 lb).       # Financial/Environmental Concerns:              Diet:  Renal  DVT Prophylaxis: DOAC  Nixon Catheter: Not present  Lines: PRESENT             Cardiac Monitoring: None  Code Status:  FULL per discussion 1/1/24    Disposition: inpt, expect > 2 midnights with urgent hemodialysis, possible need for fistula versus other central venous access for     The patient has been discussed in detail with Dr. Noble, hospitalist, who agrees with the assessment and plan as noted  The patient's care was discussed with the Attending Physician, Dr. Noble, Bedside Nurse, Patient, and ED MD Dr Auguste in detail, Renal RN .  Entered: Jose Manuel Castillo,  WALDO 01/01/2024, 10:16 AM       Jose Manuel Castillo PA-C  [facility]  Securely message with the Vocera Web Console (learn more here)  Text page via ItsGoinOn Paging/Directory    The above form was partially completed using Dragon voice dictation software.  At times inadvertent word substitutions or word omissions may occur, not seen on proof read. Should any part of the documentation be unclear or otherwise contradictory, reader should please contact me for clarification.  _________________________________________________________________    Chief Complaint   Mild MALDONADO, need for hemodialysis    History of Present Illness   History is obtained from the ED provider, patient and EMR.  Pt is considered a reliable historian    Patient confirms history overall.  After he had his central access removed 12/21 he was doing well.  Last successful hemodialysis was 12/27. HD Attempted 12/30 but due to hypotension was deferred.  Understood the plan was to have repeat outpatient hemodialysis schedule today 1/1.  He went to the clinic this morning.  He reports that staff looked at the fistula and stated they were not comfortable attempting access and sent him to the ED.  Patient had taken his Motrin 1 hour prior to that.  Patient endorsed new onset MALDONADO for the last 2 to 3 days which he attributes to extra volume.  He denies any has any resting shortness of breath.  Any chest pain or pressure.  No palpitations.  Has not mentioned any abdominal discomfort or bloating or signs of recurrent SBP.  Denies any fevers or chills consistent with a repeat infection.  No other new symptoms were endorsed.     PMH reviewed and summarized below, patient overall confirms.    PMH -  Per Dr Mcneil. Difficulty in HD to use L upper arm fistula d/t narrowing mid fistula and outflow area extravasation  12/21 Dr Mcneil saw, had office based fistula debridement. REMOVED Jug cath.  12/27- HD completed  12/29- missed scheduled HD  day?  12/30- HD attempt aborted d/t hypotension there  70/30, sent to [facility]. ED  12/30 am-seen at [facility] ED , ok BP on recheck, HD deferred  12/30 p.m.-seen at [facility] ED, HD deferred until today  1/1/24- presented to outpatient HD, declined to access fistula, referred to [facility] ED for same, admitted, Renal and Vasc med consulted, need HD resumed, r/o rule out fistula infiltrated versus extravasated, versus possible need for repeat placement of Tunneled cath    Past Medical History    I have reviewed this patient's medical history and updated it with pertinent information if needed.   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    Cancer (H) 2023    Squamous Cell Cancer- Since resolved    Cirrhosis of liver with ascites (H) 02/11/2016    Coagulopathy (H24)     Critical illness myopathy     Dialysis patient (H24)     DIALYSIS 3X/ WEEK    Embolic stroke (H) 11/20/2022    Encephalopathy     ESRD (end stage renal disease) on dialysis (H)     Gastroesophageal reflux disease     H/O alcohol abuse     History of blood transfusion     Hyperammonemia (H24)     Hyperlipidemia     Hypertension     Patients states that HTN has been resolved    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    Insomnia     Lactic acidosis 11/12/2022    Liver replaced by transplant (H) 09/20/2016    NAFLD (nonalcoholic fatty liver disease) 02/11/2016    CHRISTIAN on CPAP     Parainfluenza type 1 infection 11/19/2022    Parapneumonic effusion     Pleural effusion     Pneumothorax on left 12/16/2022    Respiratory acidosis 11/12/2022    Respiratory arrest (H) 11/12/2022    Restless legs syndrome (RLS)     SBP (spontaneous bacterial peritonitis) (H)     MNGI    Seizures (H) 12/2022    Sepsis due to  Streptococcus pneumoniae with acute hypoxic respiratory failure (H) 11/19/2022    Stroke, embolic (H) 11/20/2022    Sudden cardiac arrest (H) 12/29/2022    PEA- 2 min of CPR    Tubular adenoma 10/2019    Large cecal adenoma- due for surveillance colonoscopy in 3 years (10/2022)     Prior to Admission Medications   Prior to Admission Medications   Prescriptions Last Dose Informant Patient Reported? Taking?   ELIQUIS ANTICOAGULANT 5 MG tablet 12/31/2023 at pm  Yes Yes   Sig: Take 5 mg by mouth 2 times daily   Lacosamide (VIMPAT) 100 MG TABS tablet 12/31/2023 Spouse/Significant Other No Yes   Sig: Take 1 tablet (100 mg) by mouth every evening   acetaminophen (TYLENOL) 325 MG tablet   Yes Yes   Sig: Take 2 tablets (650 mg) by mouth every 8 hours as needed for other (For optimal non-opioid multimodal pain management to improve pain control.)   ciprofloxacin (CIPRO) 250 MG tablet 12/31/2023  No Yes   Sig: Take 1 tablet (250 mg) by mouth every 24 hours for 90 days   gabapentin (NEURONTIN) 100 MG capsule 12/31/2023 at pm  No Yes   Sig: Take 3 capsules (300 mg) by mouth at bedtime   hydrOXYzine (ATARAX) 25 MG tablet  Spouse/Significant Other No Yes   Sig: Take 1 tablet (25 mg) by mouth 3 times daily as needed for itching   lacosamide (VIMPAT) 50 MG TABS tablet 12/31/2023  No Yes   Sig: Take 1 tablet (50 mg) by mouth every morning   melatonin 3 MG tablet 12/31/2023 Spouse/Significant Other No Yes   Sig: Take 1 tablet (3 mg) by mouth At Bedtime   Patient taking differently: Take 3 mg by mouth nightly as needed   midodrine (PROAMATINE) 10 MG tablet 12/29/2023  No Yes   Sig: TAKE 1 TABLET 3 X DAILY. ON DIALYSIS DAYS(M, W, F) TAKE 2 EXTRA TABLETS 1 HOUR BEFORE, 1 TABLET WHEN YOU ARRIVE, AND 1 TABLET DURING DIALYSIS   multivitamin RENAL (TRIPHROCAPS) 1 capsule capsule 12/31/2023  No Yes   Sig: Take 1 capsule by mouth daily   sevelamer carbonate (RENVELA) 800 MG tablet 12/31/2023 at pm Spouse/Significant Other Yes Yes   Sig: Take  800 mg by mouth 4 times daily With meals and snacks   tacrolimus (GENERIC) 0.5 MG capsule 12/31/2023 at pm  No Yes   Sig: Take 1 capsule (0.5 mg) by mouth 2 times daily for 90 days      Facility-Administered Medications: None     Allergies   No Known Allergies     Physical Exam   Vitals:    01/01/24 0719   BP: 136/77   Pulse: 70   Resp: 16   Temp: 97.1  F (36.2  C)   TempSrc: Temporal   SpO2: 98%   Weight: 90.7 kg (200 lb)   Height: 1.829 m (6')       Vital Signs: Temp: 97.1  F (36.2  C) Temp src: Temporal BP: 136/77 Pulse: 70   Resp: 16 SpO2: 98 %      Weight: 200 lbs 0 oz    Constitutional: Awake, alert, cooperative, no apparent distress.  In HD suite w RN attempting access to fistula  ENT: Normocephalic, Normal sclera.    Neck: Supple, symmetrical, trachea midline, no adenopathy.  Pulmonary: on RA< No increased work of breathing, good air exchange, clear to auscultation bilaterally, no crackles or wheezing.  Cardiovascular: Regular rate and rhythm, normal S1 and S2, no S3 or S4, and no murmur noted.  GI:  soft, non-distended, non-tender.  Obese.  Skin/Integumen: Visualized skin appeared clear.  Neuro: nonfocal  Psych:  Alert and oriented x 3. Normal affect.  Extremities: No lower extremity edema noted, abd w/o obv ascites  : no Nixon      Medical Decision Making       Please see A&P for additional details of medical decision making.  MANAGEMENT DISCUSSED with the following over the past 24 hours:     NOTE(S)/MEDICAL RECORDS REVIEWED over the past 24 hours:       Chart reviewed  Notes summarized    Data   Data reviewed today: I reviewed all medications, new labs and imaging results over the last 24 hours.     Labs:   CBC with Diff:  Recent Labs   Lab Test 01/01/24  0813   WBC 3.5*   HGB 8.6*   MCV 92   PLT 63*        Comprehensive Metabolic Panel:  Recent Labs   Lab 01/01/24  0813 12/31/23  0149    136   POTASSIUM 4.7 4.2   CHLORIDE 91* 90*   CO2 24 25   ANIONGAP 20* 21*   GLC 90 105*   .5* 100.4*    CR 11.96* 10.60*   GFRESTIMATED 4* 5*   KELLY 9.1 9.3   PROTTOTAL 7.4  --    ALBUMIN 4.0  --    BILITOTAL 0.4  --    ALKPHOS 178*  --    AST 17  --    ALT 10  --      Venous Blood Gas  No lab results found in last 7 days.    IMAGING:   None today           EKG Interpretation:      ECG:   Interpreted by Jose Manuel Castillo PA-C,  Sinus, no ectopy,    No Q waves, no new ST depression/elevation/ acute strain or acute ischemic signs.   Normal QTc interval,    Appears stable vs 12/31 comparison tracing,     ------------------------- PAST 24 HR DATA REVIEWED -----------------------------------------------    I have personally reviewed the following data over the past 24 hrs:    3.5 (L)  \   8.6 (L)   / 63 (L)     135 91 (L) 124.5 (H) /  90   4.7 24 11.96 (H) \     ALT: 10 AST: 17 AP: 178 (H) TBILI: 0.4   ALB: 4.0 TOT PROTEIN: 7.4 LIPASE: N/A         The above form was partially completed using Dragon voice dictation software.  At times inadvertent word substitutions or word omissions may occur, not seen on proof read. Should any part of the documentation be unclear or otherwise contradictory, reader is encouraged to please contact me for clarification.

## 2024-01-01 NOTE — PROGRESS NOTES
RENAL CONSULTATION NOTE    REFERRING MD:  Erica Silver MD     REASON FOR CONSULTATION:  Dialysis AVF infiltration and swelling    HPI:  58 y.o gentleman with ESRD, who was sent to the ER due to L arm swelling after infiltration on Saturday.    Blanco is under my care at [facility] unit.  He had dialysis on Tuesday and Wednesday due to the holiday schedule.   He had an appointment with Dr. Simms on Friday.  Friday dialysis was scheduled for Saturday.  Unfortunately, his AVF was infiltrated on Saturday.  He did not get dialysis on Saturday.  He was sent to the ER, but was unable to be seemed.   Today, he came to [facility], and staff were concern and sent him here.   Our RN here is able to cannulate his access.  He is getting HD tx now.     ROS:  A complete review of systems was performed and is negative except as noted above.    PMH:    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    Cancer (H) 2023    Squamous Cell Cancer- Since resolved    Cirrhosis of liver with ascites (H) 02/11/2016    Coagulopathy (H24)     Critical illness myopathy     Dialysis patient (H24)     DIALYSIS 3X/ WEEK    Embolic stroke (H) 11/20/2022    Encephalopathy     ESRD (end stage renal disease) on dialysis (H)     Gastroesophageal reflux disease     H/O alcohol abuse     History of blood transfusion     Hyperammonemia (H24)     Hyperlipidemia     Hypertension     Patients states that HTN has been resolved    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    Insomnia     Lactic acidosis 11/12/2022    Liver replaced by transplant (H) 09/20/2016    NAFLD (nonalcoholic fatty liver disease) 02/11/2016    CHRISTIAN on CPAP     Parainfluenza type 1 infection 11/19/2022    Parapneumonic effusion      Pleural effusion     Pneumothorax on left 12/16/2022    Respiratory acidosis 11/12/2022    Respiratory arrest (H) 11/12/2022    Restless legs syndrome (RLS)     SBP (spontaneous bacterial peritonitis) (H)     MNGI    Seizures (H) 12/2022    Sepsis due to Streptococcus pneumoniae with acute hypoxic respiratory failure (H) 11/19/2022    Stroke, embolic (H) 11/20/2022    Sudden cardiac arrest (H) 12/29/2022    PEA- 2 min of CPR    Tubular adenoma 10/2019    Large cecal adenoma- due for surveillance colonoscopy in 3 years (10/2022)       PSH:    Past Surgical History:   Procedure Laterality Date    APPENDECTOMY      BENCH LIVER N/A 09/20/2016    Procedure: BENCH LIVER;  Surgeon: Enoc Crews MD;  Location: [facility] OR    BRONCHOSCOPY FLEXIBLE AND RIGID N/A 12/20/2022    Procedure: BRONCHOSCOPY;  Surgeon: Alena Valenzuela MD;  Location:  GI    COLONOSCOPY  08/06/2013    repeat in 2018    COLONOSCOPY N/A 10/04/2019    Procedure: COLONOSCOPY, WITH POLYPECTOMY AND BIOPSY;  Surgeon: Go Chong MD;  Location: [facility] OR    CREATE FISTULA ARTERIOVENOUS UPPER EXTREMITY Left 03/21/2023    Procedure: LEFT UPPER EXTREMITY ARTERIOVENOUS FISTULA CREATION. LIGATION OF COMPETING VASCULAR BRANCHES- LEFT;  Surgeon: Deejay Mcneil MD;  Location:  OR    CV PERICARDIOCENTESIS N/A 9/29/2023    Procedure: Pericardiocentesis;  Surgeon: Barry Mckeon MD;  Location:  HEART CARDIAC CATH LAB    CV PERICARDIOCENTESIS N/A 10/11/2023    Procedure: Pericardiocentesis;  Surgeon: Ulises Bhakta MD;  Location:  HEART CARDIAC CATH LAB    CV RIGHT HEART CATH MEASUREMENTS RECORDED N/A 10/11/2023    Procedure: Right Heart Catheterization;  Surgeon: Ulises Bhakta MD;  Location:  HEART CARDIAC CATH LAB    HERNIA REPAIR      IR CHEST TUBE PLACEMENT NON-TUNNELED RIGHT  12/12/2022    IR CVC TUNNEL PLACEMENT > 5 YRS OF AGE  12/06/2022    IR DIALYSIS FISTULOGRAM LEFT  07/13/2023    IR GASTROSTOMY TUBE  CHANGE  2023    IR GASTROSTOMY TUBE PERCUTANEOUS PLCMNT  2022    IR PARACENTESIS  2022    IR PARACENTESIS  2022    IR PARACENTESIS  2/10/2016    IR PARACENTESIS  2016    IR THORACENTESIS  2022    IR THORACENTESIS  2020    IR THORACENTESIS  08/10/2020    IR TRANSCATHETER BIOPSY  2022    REVISION FISTULA ARTERIOVENOUS UPPER EXTREMITY Left 8/15/2023    Procedure: 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 2; OUTFLOW REVISION CEPHALIC TO BRACHIAL VEIN;  Surgeon: Deejay Mcneil MD;  Location: [facility] OR    TRACHEOSTOMY N/A 2022    Procedure: TRACHEOSTOMY;  Surgeon: Nilesh Jackson MD;  Location: [facility] OR    TRANSPLANT LIVER RECIPIENT  DONOR N/A 2016    Procedure: TRANSPLANT LIVER RECIPIENT  DONOR;  Surgeon: Enoc Crews MD;  Location: [facility] OR       MEDICATIONS:      ALLERGIES:    Allergies as of 2024    (No Known Allergies)       FH:    Family History   Problem Relation Age of Onset    Coronary Artery Disease No family hx of     Cardiomyopathy No family hx of        SH:    Social History     Socioeconomic History    Marital status:      Spouse name: Not on file    Number of children: Not on file    Years of education: Not on file    Highest education level: Not on file   Occupational History    Not on file   Tobacco Use    Smoking status: Never    Smokeless tobacco: Never   Vaping Use    Vaping Use: Never used   Substance and Sexual Activity    Alcohol use: Not Currently     Alcohol/week: 0.0 standard drinks of alcohol    Drug use: No    Sexual activity: Not Currently   Other Topics Concern    Parent/sibling w/ CABG, MI or angioplasty before 65F 55M? Not Asked   Social History Narrative    Not on file     Social Determinants of Health     Financial Resource Strain: Low Risk  (2023)    Financial Resource Strain     Within the past 12 months, have you or your family members you live with been unable to get  utilities (heat, electricity) when it was really needed?: No   Food Insecurity: Low Risk  (11/2/2023)    Food Insecurity     Within the past 12 months, did you worry that your food would run out before you got money to buy more?: No     Within the past 12 months, did the food you bought just not last and you didn t have money to get more?: No   Transportation Needs: Low Risk  (11/2/2023)    Transportation Needs     Within the past 12 months, has lack of transportation kept you from medical appointments, getting your medicines, non-medical meetings or appointments, work, or from getting things that you need?: No   Physical Activity: Not on file   Stress: Not on file   Social Connections: Not on file   Interpersonal Safety: Low Risk  (9/21/2023)    Interpersonal Safety     Do you feel physically and emotionally safe where you currently live?: Yes     Within the past 12 months, have you been hit, slapped, kicked or otherwise physically hurt by someone?: No     Within the past 12 months, have you been humiliated or emotionally abused in other ways by your partner or ex-partner?: No   Housing Stability: Low Risk  (11/2/2023)    Housing Stability     Do you have housing? : Yes     Are you worried about losing your housing?: No       PHYSICAL EXAM:    /76   Pulse 66   Temp 97.1  F (36.2  C) (Temporal)   Resp 16   Ht 1.829 m (6')   Wt 90.7 kg (200 lb)   SpO2 98%   BMI 27.12 kg/m    GENERAL: pleasant, alert, NAD  HEENT:  Normocephalic. No gross abnormalities.  Pupils equal.  MMM.   CV: RRR, +murmurs, no clicks, gallops, or rubs, no edema,  RESP: Clear bilaterally with good efforts. No wheezes or crackles.  GI: Abdomen obese, soft, NT  MUSCULOSKELETAL: extremities nl - no gross deformities noted. No edema  NEURO:  Awake, alert and conversing normally  PSYCH: mood good, affect appropriate  LYMPH: No palpable ant/post cervical     LABS:      CBC RESULTS:     Recent Labs   Lab 01/01/24  0813   WBC 3.5*   RBC 2.86*    HGB 8.6*   HCT 26.4*   PLT 63*       BMP RESULTS:  Recent Labs   Lab 01/01/24  0813 12/31/23  0149    136   POTASSIUM 4.7 4.2   CHLORIDE 91* 90*   CO2 24 25   .5* 100.4*   CR 11.96* 10.60*   GLC 90 105*   KELLY 9.1 9.3       INRNo lab results found in last 7 days.     DIAGNOSTICS:  Reviewed    A/P: 58 y.o man with ESRD, admitted for AVF dysfunction.     # ESRD:   -Deirdre SLP under my care   -MWF  # L AVF: new access. Infiltrated on Saturday. LUE are swollen and does not look infected. RN is able to cannulate.   # Anemia: Mircera with HD   -venofer 100 mg with HD  # IDH with HD: He takes midodrine with HD.   # Restless leg syndrome.     Plan:  # Venofer 100 mg with HD  # 3.5 hrs HD. Qb 250. 17g needle. UF ~ 2.5 liters net. HD again tomorrow.  # Vascular surgery to see patient    Zenon Bradshaw MD  [facility] - Nephrology  Office Phone: [phone]  Pager: [phone]

## 2024-01-02 ENCOUNTER — APPOINTMENT (OUTPATIENT)
Dept: ULTRASOUND IMAGING | Facility: CLINIC | Age: 59
DRG: 252 | End: 2024-01-02
Attending: HOSPITALIST
Payer: COMMERCIAL

## 2024-01-02 ENCOUNTER — TELEPHONE (OUTPATIENT)
Dept: TRANSPLANT | Facility: CLINIC | Age: 59
End: 2024-01-02
Payer: COMMERCIAL

## 2024-01-02 DIAGNOSIS — K76.89 LIVER DYSFUNCTION: ICD-10-CM

## 2024-01-02 DIAGNOSIS — I25.10 CARDIOVASCULAR DISEASE: ICD-10-CM

## 2024-01-02 DIAGNOSIS — N18.6 END STAGE RENAL DISEASE (H): ICD-10-CM

## 2024-01-02 DIAGNOSIS — Z76.82 ORGAN TRANSPLANT CANDIDATE: ICD-10-CM

## 2024-01-02 DIAGNOSIS — J98.4 DISEASE OF LUNG: ICD-10-CM

## 2024-01-02 DIAGNOSIS — Z94.4 STATUS POST LIVER TRANSPLANT (H): Primary | ICD-10-CM

## 2024-01-02 DIAGNOSIS — Z01.818 PRE-TRANSPLANT EVALUATION FOR KIDNEY TRANSPLANT: ICD-10-CM

## 2024-01-02 LAB
ALBUMIN SERPL BCG-MCNC: 4.2 G/DL (ref 3.5–5.2)
ALP SERPL-CCNC: 179 U/L (ref 40–150)
ALT SERPL W P-5'-P-CCNC: 12 U/L (ref 0–70)
ANION GAP SERPL CALCULATED.3IONS-SCNC: 14 MMOL/L (ref 7–15)
AST SERPL W P-5'-P-CCNC: 22 U/L (ref 0–45)
BASOPHILS # BLD AUTO: 0 10E3/UL (ref 0–0.2)
BASOPHILS NFR BLD AUTO: 1 %
BILIRUB SERPL-MCNC: 0.8 MG/DL
BUN SERPL-MCNC: 47 MG/DL (ref 8–23)
CALCIUM SERPL-MCNC: 9.3 MG/DL (ref 8.6–10)
CHLORIDE SERPL-SCNC: 93 MMOL/L (ref 98–107)
CREAT SERPL-MCNC: 5.73 MG/DL (ref 0.67–1.17)
DEPRECATED HCO3 PLAS-SCNC: 31 MMOL/L (ref 22–29)
EGFRCR SERPLBLD CKD-EPI 2021: 11 ML/MIN/1.73M2
EOSINOPHIL # BLD AUTO: 0.3 10E3/UL (ref 0–0.7)
EOSINOPHIL NFR BLD AUTO: 7 %
ERYTHROCYTE [DISTWIDTH] IN BLOOD BY AUTOMATED COUNT: 14.7 % (ref 10–15)
GLUCOSE SERPL-MCNC: 93 MG/DL (ref 70–99)
HCT VFR BLD AUTO: 26.6 % (ref 40–53)
HGB BLD-MCNC: 8.7 G/DL (ref 13.3–17.7)
IMM GRANULOCYTES # BLD: 0 10E3/UL
IMM GRANULOCYTES NFR BLD: 0 %
LYMPHOCYTES # BLD AUTO: 1.3 10E3/UL (ref 0.8–5.3)
LYMPHOCYTES NFR BLD AUTO: 32 %
MCH RBC QN AUTO: 30.7 PG (ref 26.5–33)
MCHC RBC AUTO-ENTMCNC: 32.7 G/DL (ref 31.5–36.5)
MCV RBC AUTO: 94 FL (ref 78–100)
MONOCYTES # BLD AUTO: 0.7 10E3/UL (ref 0–1.3)
MONOCYTES NFR BLD AUTO: 17 %
NEUTROPHILS # BLD AUTO: 1.8 10E3/UL (ref 1.6–8.3)
NEUTROPHILS NFR BLD AUTO: 43 %
NRBC # BLD AUTO: 0 10E3/UL
NRBC BLD AUTO-RTO: 0 /100
PLATELET # BLD AUTO: 78 10E3/UL (ref 150–450)
POTASSIUM SERPL-SCNC: 4.5 MMOL/L (ref 3.4–5.3)
PROT SERPL-MCNC: 7.5 G/DL (ref 6.4–8.3)
RBC # BLD AUTO: 2.83 10E6/UL (ref 4.4–5.9)
RETICS # AUTO: 0.04 10E6/UL (ref 0.03–0.1)
RETICS/RBC NFR AUTO: 1.2 % (ref 0.5–2)
SODIUM SERPL-SCNC: 138 MMOL/L (ref 135–145)
WBC # BLD AUTO: 4 10E3/UL (ref 4–11)

## 2024-01-02 PROCEDURE — 82607 VITAMIN B-12: CPT | Performed by: HOSPITALIST

## 2024-01-02 PROCEDURE — 85025 COMPLETE CBC W/AUTO DIFF WBC: CPT | Performed by: HOSPITALIST

## 2024-01-02 PROCEDURE — 250N000011 HC RX IP 250 OP 636: Performed by: HOSPITALIST

## 2024-01-02 PROCEDURE — 250N000012 HC RX MED GY IP 250 OP 636 PS 637: Performed by: EMERGENCY MEDICINE

## 2024-01-02 PROCEDURE — 120N000001 HC R&B MED SURG/OB

## 2024-01-02 PROCEDURE — 99233 SBSQ HOSP IP/OBS HIGH 50: CPT | Performed by: HOSPITALIST

## 2024-01-02 PROCEDURE — 250N000013 HC RX MED GY IP 250 OP 250 PS 637: Performed by: PHYSICIAN ASSISTANT

## 2024-01-02 PROCEDURE — 36415 COLL VENOUS BLD VENIPUNCTURE: CPT | Performed by: HOSPITALIST

## 2024-01-02 PROCEDURE — 90937 HEMODIALYSIS REPEATED EVAL: CPT

## 2024-01-02 PROCEDURE — 250N000013 HC RX MED GY IP 250 OP 250 PS 637: Performed by: EMERGENCY MEDICINE

## 2024-01-02 PROCEDURE — 250N000011 HC RX IP 250 OP 636: Mod: JZ | Performed by: INTERNAL MEDICINE

## 2024-01-02 PROCEDURE — 85060 BLOOD SMEAR INTERPRETATION: CPT | Performed by: PATHOLOGY

## 2024-01-02 PROCEDURE — 90935 HEMODIALYSIS ONE EVALUATION: CPT | Performed by: INTERNAL MEDICINE

## 2024-01-02 PROCEDURE — 85045 AUTOMATED RETICULOCYTE COUNT: CPT | Performed by: HOSPITALIST

## 2024-01-02 PROCEDURE — 93990 DOPPLER FLOW TESTING: CPT

## 2024-01-02 PROCEDURE — P9045 ALBUMIN (HUMAN), 5%, 250 ML: HCPCS | Mod: JZ | Performed by: INTERNAL MEDICINE

## 2024-01-02 PROCEDURE — 80053 COMPREHEN METABOLIC PANEL: CPT | Performed by: HOSPITALIST

## 2024-01-02 PROCEDURE — 82746 ASSAY OF FOLIC ACID SERUM: CPT | Performed by: HOSPITALIST

## 2024-01-02 RX ORDER — ALBUMIN (HUMAN) 12.5 G/50ML
50 SOLUTION INTRAVENOUS
Status: DISCONTINUED | OUTPATIENT
Start: 2024-01-02 | End: 2024-01-02

## 2024-01-02 RX ORDER — HYDROMORPHONE HCL IN WATER/PF 6 MG/30 ML
0.2 PATIENT CONTROLLED ANALGESIA SYRINGE INTRAVENOUS
Status: DISCONTINUED | OUTPATIENT
Start: 2024-01-02 | End: 2024-01-03

## 2024-01-02 RX ADMIN — LACOSAMIDE 50 MG: 50 TABLET, FILM COATED ORAL at 12:47

## 2024-01-02 RX ADMIN — APIXABAN 5 MG: 5 TABLET, FILM COATED ORAL at 20:14

## 2024-01-02 RX ADMIN — TACROLIMUS 0.5 MG: 0.5 CAPSULE ORAL at 12:47

## 2024-01-02 RX ADMIN — IRON SUCROSE 200 MG: 20 INJECTION, SOLUTION INTRAVENOUS at 10:29

## 2024-01-02 RX ADMIN — ALBUMIN HUMAN 250 ML: 0.05 INJECTION, SOLUTION INTRAVENOUS at 08:20

## 2024-01-02 RX ADMIN — HYDROMORPHONE HYDROCHLORIDE 0.2 MG: 0.2 INJECTION, SOLUTION INTRAMUSCULAR; INTRAVENOUS; SUBCUTANEOUS at 15:40

## 2024-01-02 RX ADMIN — MIDODRINE HYDROCHLORIDE 10 MG: 5 TABLET ORAL at 07:53

## 2024-01-02 RX ADMIN — HYDROXYZINE HYDROCHLORIDE 25 MG: 25 TABLET, FILM COATED ORAL at 17:41

## 2024-01-02 RX ADMIN — HYDROMORPHONE HYDROCHLORIDE 0.2 MG: 0.2 INJECTION, SOLUTION INTRAMUSCULAR; INTRAVENOUS; SUBCUTANEOUS at 22:23

## 2024-01-02 RX ADMIN — MIDODRINE HYDROCHLORIDE 10 MG: 5 TABLET ORAL at 17:34

## 2024-01-02 RX ADMIN — HYDROMORPHONE HYDROCHLORIDE 0.2 MG: 0.2 INJECTION, SOLUTION INTRAMUSCULAR; INTRAVENOUS; SUBCUTANEOUS at 20:15

## 2024-01-02 RX ADMIN — LACOSAMIDE 100 MG: 50 TABLET, FILM COATED ORAL at 20:14

## 2024-01-02 RX ADMIN — CIPROFLOXACIN HYDROCHLORIDE 250 MG: 250 TABLET, FILM COATED ORAL at 15:40

## 2024-01-02 RX ADMIN — SEVELAMER CARBONATE 800 MG: 800 TABLET, FILM COATED ORAL at 17:34

## 2024-01-02 RX ADMIN — HYDROXYZINE HYDROCHLORIDE 25 MG: 25 TABLET, FILM COATED ORAL at 12:47

## 2024-01-02 RX ADMIN — MIDODRINE HYDROCHLORIDE 10 MG: 5 TABLET ORAL at 12:48

## 2024-01-02 RX ADMIN — HYDROMORPHONE HYDROCHLORIDE 0.2 MG: 0.2 INJECTION, SOLUTION INTRAMUSCULAR; INTRAVENOUS; SUBCUTANEOUS at 17:41

## 2024-01-02 RX ADMIN — TACROLIMUS 0.5 MG: 0.5 CAPSULE ORAL at 17:34

## 2024-01-02 RX ADMIN — HYDROMORPHONE HYDROCHLORIDE 0.2 MG: 0.2 INJECTION, SOLUTION INTRAMUSCULAR; INTRAVENOUS; SUBCUTANEOUS at 07:41

## 2024-01-02 RX ADMIN — HYDROMORPHONE HYDROCHLORIDE 0.2 MG: 0.2 INJECTION, SOLUTION INTRAMUSCULAR; INTRAVENOUS; SUBCUTANEOUS at 12:51

## 2024-01-02 RX ADMIN — OXYCODONE HYDROCHLORIDE 5 MG: 5 TABLET ORAL at 00:54

## 2024-01-02 RX ADMIN — APIXABAN 5 MG: 5 TABLET, FILM COATED ORAL at 12:47

## 2024-01-02 RX ADMIN — HYDROMORPHONE HYDROCHLORIDE 0.2 MG: 0.2 INJECTION, SOLUTION INTRAMUSCULAR; INTRAVENOUS; SUBCUTANEOUS at 04:50

## 2024-01-02 RX ADMIN — Medication 1 CAPSULE: at 12:47

## 2024-01-02 RX ADMIN — SEVELAMER CARBONATE 800 MG: 800 TABLET, FILM COATED ORAL at 12:47

## 2024-01-02 RX ADMIN — HYDROMORPHONE HYDROCHLORIDE 0.2 MG: 0.2 INJECTION, SOLUTION INTRAMUSCULAR; INTRAVENOUS; SUBCUTANEOUS at 02:33

## 2024-01-02 RX ADMIN — GABAPENTIN 300 MG: 300 CAPSULE ORAL at 22:23

## 2024-01-02 RX ADMIN — Medication 1 SPRAY: at 12:51

## 2024-01-02 ASSESSMENT — ACTIVITIES OF DAILY LIVING (ADL)
ADLS_ACUITY_SCORE: 22
ADLS_ACUITY_SCORE: 20
ADLS_ACUITY_SCORE: 22

## 2024-01-02 NOTE — PROGRESS NOTES
Nephrology Progress Note  01/02/2024         Assessment & Recommendations:    58 y.o man with ESRD, admitted for AVF dysfunction.      # ESRD:              -SLP under care of Dr. Bradshaw              -F  # L AVF: infiltrated on Saturday and unable to use at outpatient unit.  Accessed  here yesterday and today with 17-gauge needles.  Vascular surgery on board.  No new areas of stenosis and recent ultrasound.  Plan to repeat duplex today.    # Anemia: Mircera and Venover  # Intra-dialytic hypotension: + midodrine    Next dialysis tomorrow per Monday Wednesday Friday schedule, either here or as outpatient.      Maria Fink MD  [facility] - Nephrology   [phone]      Interval History :   Seen / examined on dialysis.  Early part of dialysis going okay.  17-gauge needles used.  Venous pressure slightly high.  Ultrafiltration goal of 2-2.5 L.  Blood pressure holding okay.    Physical Exam:   I/O last 3 completed shifts:  In: 200 [P.O.:200]  Out: 2600 [Other:2600]    /76   Pulse 77   Temp 97.9  F (36.6  C) (Oral)   Resp 18   Ht 1.829 m (6')   Wt 90.7 kg (200 lb)   SpO2 98%   BMI 27.12 kg/m      GENERAL APPEARANCE: alert and no distress  EYES:  no scleral icterus, pupils equal  PULM: lungs clear to auscultation,   CV: regular rhythm, normal rate, no rub    GI: soft, non tender,   NEURO: mentation intact and speech normal, no asterixis   Access -left upper extremity AV fistula.  Left arm is swollen.  Blood flow rate of 250 mL/minute    Labs:   All labs reviewed by me  Electrolytes/Renal -   Recent Labs   Lab Test 01/01/24  0813 12/31/23  0149 12/18/23  1436 11/01/23  0634 10/24/23  0537 10/23/23  0654 10/06/23  1530 10/06/23  1040 10/05/23  0251 10/05/23  0245 04/25/23  1113 04/24/23  0815 04/19/23  1433 04/18/23  0928    136 138 129*   < > 121*   < >  --    < > 136   < > 138   < > 136   POTASSIUM 4.7 4.2 4.3 4.4   < > 5.5*   < >  --    < > 3.8   < > 4.9   < > 4.2   CHLORIDE 91* 90* 92* 93*    < > 87*   < >  --    < > 90*   < > 98   < > 97*   CO2 24 25 32* 21*   < > 19*   < >  --    < > 24   < > 25   < > 25   .5* 100.4* 39.2* 67.1*   < > 87.9*   < >  --    < > 44.5*   < > 93.8*   < > 69.9*   CR 11.96* 10.60* 5.13* 7.05*   < > 8.03*   < >  --    < > 5.77*   < > 7.83*   < > 5.62*   GLC 90 105* 89 94   < > 107*   < >  --    < > 127*   < > 97   < > 124*   KELLY 9.1 9.3 9.6 8.9   < > 8.8   < >  --    < > 9.4   < > 10.4*   < > 10.3*   MAG  --   --   --   --   --   --   --   --   --  2.1  --  2.4*  --  2.0   PHOS  --   --   --  4.0  --  3.1  --  6.6*   < > 5.2*   < > 8.0*   < > 5.5*    < > = values in this interval not displayed.       CBC -   Recent Labs   Lab Test 01/01/24  0813 12/18/23  1436 11/01/23  0634   WBC 3.5* 4.7 5.6   HGB 8.6* 11.6* 7.3*   PLT 63* 110* 126*       LFTs -   Recent Labs   Lab Test 01/01/24  0813 12/18/23  1436 11/01/23  0634   ALKPHOS 178* 210* 222*   BILITOTAL 0.4 0.6 0.3   ALT 10 13 <5   AST 17 24 15   PROTTOTAL 7.4 8.3 5.9*   ALBUMIN 4.0 4.4 2.8*       Iron Panel -   Recent Labs   Lab Test 10/24/23  0537 04/14/23  0725 04/06/23  0808 03/25/23  1058 03/12/23  0756   IRON 38* 60* 78  --  37*   IRONSAT 20 29  --   --  26   HOSSEIN 423* 286  --    < > 252    < > = values in this interval not displayed.           Current Medications:   - MEDICATION INSTRUCTIONS for Dialysis Patients -   Does not apply See Admin Instructions    apixaban ANTICOAGULANT  5 mg Oral BID    artificial saliva  1 spray Mouth/Throat 4x Daily    ciprofloxacin  250 mg Oral Daily    gabapentin  300 mg Oral At Bedtime    lacosamide  100 mg Oral QPM    lacosamide  50 mg Oral QAM    midodrine  10 mg Oral TID w/meals    midodrine  10 mg Oral Once    multivitamin RENAL  1 capsule Oral Daily    - MEDICATION INSTRUCTIONS -   Does not apply Once    sevelamer carbonate  800 mg Oral TID w/meals    sodium chloride (PF)  3 mL Intracatheter Q8H    sodium chloride 0.9%  250 mL Intravenous Once in dialysis/CRRT    sodium chloride  0.9%  300 mL Hemodialysis Machine Once    tacrolimus  0.5 mg Oral BID IS      - MEDICATION INSTRUCTIONS -      - MEDICATION INSTRUCTIONS -      - MEDICATION INSTRUCTIONS -       Maria Fink MD

## 2024-01-02 NOTE — PLAN OF CARE
Goal Outcome Evaluation:         ED admission  history of cirrhosis, ESRD on dialysis, fistula malfunction,and   evaluation of dialysis fistula pain. HD done today .Plan for HD again tomorrow.Pt is complaining of fistula pain.got telephone order for PRN Tylenol first ,then  gabapentin 100 mg one time dose. Wait 2 hrs, Last is oxycodone 5 mg one time dose. Order placed.pt is alert family at bedside.Dressing intact in the fistula site.

## 2024-01-02 NOTE — PLAN OF CARE
Goal Outcome Evaluation:      Plan of Care Reviewed With: patient    Overall Patient Progress: no changeOverall Patient Progress: no change     Summary:  Hemodiaylsis, infiltrated fistula   DATE & TIME: 01/01-01/02 [id number]  Cognitive Concerns/ Orientation : A&Ox4   BEHAVIOR & AGGRESSION TOOL COLOR: A&Ox4  CIWA SCORE: NA   ABNL VS/O2: VSS on RA- keon at times  MOBILITY: Independent  PAIN MANAGMENT: c/o left arm pain d/t infiltrated fistula. Tylenol, gabapentin, and oxy given x1- not effective. Order obtained for dilaudid IV q2 PRN given x2.   DIET: Renal   BOWEL/BLADDER: Continent of bowel- hemodialysis- producing very minimal urine  ABNL LAB/BG: CR- 11.96, Hgb 8.6   DRAIN/DEVICES: PIV SL   TELEMETRY RHYTHM: NA  SKIN: L arm is bandaged/ ace wrapped- +2 edema- sensation intact. Dialysis port was removed on right chest few weeks ago, small hematoma present. Dressing CDI.   TESTS/PROCEDURES: duplex US, dialysis   D/C DAY/GOALS/PLACE: pending   OTHER IMPORTANT INFO: Plan for dialysis again today.

## 2024-01-02 NOTE — CONSULTS
Vascular Surgery Consultation    Blanco Osborne MRN# [id number]   Age: 58 year old YOB: 1965     Date of Admission:  1/1/2024    Date of Consult:   01/01/24    Reason for consult: Left AVF infiltration       Requesting service: Hospital medicine; requesting provider: Mr. Jose Manuel Castillo PA-C                   Assessment and Plan:   58 y.o. male with ESRD on hemodialysis via proximal left radial artery to cephalic vein AV fistula and recent removal of right IJ tunneled catheter seen in consultation today after fistula was infiltrated on 12/30/23. Due to this and the holiday schedule he has not dialyzed in 6 days. Thankfully the dialysis nurses have been able to cannulate the fistula here and he dialyzed today without issue.  His nephrologist, Dr. Bradshaw, plans for another dialysis session tomorrow.  We recommend the left arm be gently wrapped with ace wrap after dialysis to treat the kizzy and discomfort.  Duplex ultrasound is ordered but we are told there is not staff to perform this study today. He will complete a duplex tomorrow before discharging.  With his recent history PE in October recommend continued anticoagulation with Eliquis    Patient was seen and discussed with staff, Dr. Silva.    Alban Carvajal MD           History of Present Illness:   Mr. Blanco Osborne is a 58-year-old male with a history of liver transplant, DVT and PE, A-fib on Eliquis, embolic stroke, end-stage renal disease via left upper extremity AVF (proximal radial artery to cephalic vein) and history of two pseudoanerysmal segments repaired primarily in August 2023, The fistula was infiltrated during attempt for cannulation with dialysis on Saturday.  Apparently went to the emergency room but was unable to be seen.  He attempted to dialyze again today however staff were unable to cannulate his fistula and he was again referred to the [facility] ED.  He is now gone 6 days without dialysis and notes significant fatigue and  complains of left arm swelling and discomfort since the fistula was infiltrated on Saturday.     Note he was last seen in vascular clinic on 12/21/2023 by Dr. Mcneil when his fistula was noted to be working well.  There is a mild area of narrowing in the upper arm as well as at the axilla however the outflow volume is more than adequate at 1126 ml/min. His right internal jugular tunneled line was removed at his December visit and he also notes a small hematoma since this time.    He is seen in the dialysis unit at [facility] where the nurses were able to successfully cannulate his fistula and is currently dialyzing without issue. He has no numbness or weakness in the arm or hand.  He denies any chest pain or constitutional symptoms.           Past Medical History:     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    Cancer (H) 2023    Squamous Cell Cancer- Since resolved    Cirrhosis of liver with ascites (H) 02/11/2016    Coagulopathy (H24)     Critical illness myopathy     Dialysis patient (H24)     DIALYSIS 3X/ WEEK    Embolic stroke (H) 11/20/2022    Encephalopathy     ESRD (end stage renal disease) on dialysis (H)     Gastroesophageal reflux disease     H/O alcohol abuse     History of blood transfusion     Hyperammonemia (H24)     Hyperlipidemia     Hypertension     Patients states that HTN has been resolved    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    Insomnia     Lactic acidosis 11/12/2022    Liver replaced by transplant (H) 09/20/2016    NAFLD (nonalcoholic fatty liver disease) 02/11/2016    CHRISTIAN on CPAP     Parainfluenza type 1 infection 11/19/2022    Parapneumonic effusion     Pleural effusion     Pneumothorax on left 12/16/2022    Respiratory  acidosis 11/12/2022    Respiratory arrest (H) 11/12/2022    Restless legs syndrome (RLS)     SBP (spontaneous bacterial peritonitis) (H)     MNGI    Seizures (H) 12/2022    Sepsis due to Streptococcus pneumoniae with acute hypoxic respiratory failure (H) 11/19/2022    Stroke, embolic (H) 11/20/2022    Sudden cardiac arrest (H) 12/29/2022    PEA- 2 min of CPR    Tubular adenoma 10/2019    Large cecal adenoma- due for surveillance colonoscopy in 3 years (10/2022)             Past Surgical History:     Past Surgical History:   Procedure Laterality Date    APPENDECTOMY      BENCH LIVER N/A 09/20/2016    Procedure: BENCH LIVER;  Surgeon: Enoc Crews MD;  Location: [facility] OR    BRONCHOSCOPY FLEXIBLE AND RIGID N/A 12/20/2022    Procedure: BRONCHOSCOPY;  Surgeon: Alena Valenzuela MD;  Location:  GI    COLONOSCOPY  08/06/2013    repeat in 2018    COLONOSCOPY N/A 10/04/2019    Procedure: COLONOSCOPY, WITH POLYPECTOMY AND BIOPSY;  Surgeon: Go Chong MD;  Location: [facility] OR    CREATE FISTULA ARTERIOVENOUS UPPER EXTREMITY Left 03/21/2023    Procedure: LEFT UPPER EXTREMITY ARTERIOVENOUS FISTULA CREATION. LIGATION OF COMPETING VASCULAR BRANCHES- LEFT;  Surgeon: Deejay Mcneil MD;  Location:  OR    CV PERICARDIOCENTESIS N/A 9/29/2023    Procedure: Pericardiocentesis;  Surgeon: Barry Mckeon MD;  Location:  HEART CARDIAC CATH LAB    CV PERICARDIOCENTESIS N/A 10/11/2023    Procedure: Pericardiocentesis;  Surgeon: Ulises Bhakta MD;  Location:  HEART CARDIAC CATH LAB    CV RIGHT HEART CATH MEASUREMENTS RECORDED N/A 10/11/2023    Procedure: Right Heart Catheterization;  Surgeon: Ulises Bhakta MD;  Location:  HEART CARDIAC CATH LAB    HERNIA REPAIR      IR CHEST TUBE PLACEMENT NON-TUNNELED RIGHT  12/12/2022    IR CVC TUNNEL PLACEMENT > 5 YRS OF AGE  12/06/2022    IR DIALYSIS FISTULOGRAM LEFT  07/13/2023    IR GASTROSTOMY TUBE CHANGE  02/08/2023    IR GASTROSTOMY TUBE  PERCUTANEOUS PLCMNT  2022    IR PARACENTESIS  2022    IR PARACENTESIS  2022    IR PARACENTESIS  2/10/2016    IR PARACENTESIS  2016    IR THORACENTESIS  2022    IR THORACENTESIS  2020    IR THORACENTESIS  08/10/2020    IR TRANSCATHETER BIOPSY  2022    REVISION FISTULA ARTERIOVENOUS UPPER EXTREMITY Left 8/15/2023    Procedure: 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 2; OUTFLOW REVISION CEPHALIC TO BRACHIAL VEIN;  Surgeon: Deejay Mcneil MD;  Location: [facility] OR    TRACHEOSTOMY N/A 2022    Procedure: TRACHEOSTOMY;  Surgeon: Nilesh Jackson MD;  Location: [facility] OR    TRANSPLANT LIVER RECIPIENT  DONOR N/A 2016    Procedure: TRANSPLANT LIVER RECIPIENT  DONOR;  Surgeon: Enoc Crews MD;  Location: [facility] OR             Social History:     Social History     Tobacco Use    Smoking status: Never    Smokeless tobacco: Never   Substance Use Topics    Alcohol use: Not Currently     Alcohol/week: 0.0 standard drinks of alcohol             Family History:     Family History   Problem Relation Age of Onset    Coronary Artery Disease No family hx of     Cardiomyopathy No family hx of                 Allergies:   No Known Allergies          Medications:     Current Facility-Administered Medications   Medication    acetaminophen (TYLENOL) tablet 650 mg    albumin human 25 % injection 50 mL    albumin human 5 % injection  mL    apixaban ANTICOAGULANT (ELIQUIS) tablet 5 mg    artificial saliva (BIOTENE MT) solution 1 spray    calcium carbonate (TUMS) chewable tablet 1,000 mg    ciprofloxacin (CIPRO) tablet 250 mg    gabapentin (NEURONTIN) capsule 100 mg    [START ON 2024] gabapentin (NEURONTIN) capsule 300 mg    hydrOXYzine HCl (ATARAX) tablet 25 mg    lacosamide (VIMPAT) tablet 100 mg    lacosamide (VIMPAT) tablet 50 mg    lidocaine (LMX4) cream    lidocaine 1 % 0.1-1 mL    lidocaine 1 % 0.5 mL    midodrine (PROAMATINE) tablet 10 mg     [START ON 1/2/2024] midodrine (PROAMATINE) tablet 10 mg    [START ON 1/2/2024] multivitamin RENAL (TRIPHROCAPS) capsule 1 capsule    oxyCODONE (ROXICODONE) tablet 5 mg    Patient is already receiving anticoagulation with heparin, enoxaparin (LOVENOX), warfarin (COUMADIN)  or other anticoagulant medication    senna-docusate (SENOKOT-S/PERICOLACE) 8.6-50 MG per tablet 1 tablet    Or    senna-docusate (SENOKOT-S/PERICOLACE) 8.6-50 MG per tablet 2 tablet    sevelamer carbonate (RENVELA) tablet 800 mg    sevelamer carbonate (RENVELA) tablet 800 mg    sodium chloride (PF) 0.9% PF flush 3 mL    sodium chloride (PF) 0.9% PF flush 3 mL    sodium chloride 0.9% BOLUS 100-150 mL    Stop Heparin 60 minutes before end of treatment    tacrolimus (GENERIC) capsule 0.5 mg               Review of Systems:   A 12 point review of systems was completed and found to be negative unless otherwise stated in the above HPI            Physical Exam:   /80 (BP Location: Right arm)   Pulse 77   Temp 97.5  F (36.4  C) (Axillary)   Resp 16   Ht 1.829 m (6')   Wt 90.7 kg (200 lb)   SpO2 95%   BMI 27.12 kg/m        Intake/Output Summary (Last 24 hours) at 1/1/2024 2001  Last data filed at 1/1/2024 1700  Gross per 24 hour   Intake 200 ml   Output 2600 ml   Net -2400 ml       GEN: A&Ox3, no acute distress  NEURO: CN II-XII grossly intact. No gross neurologic deficits  NECK: trachea midline, no JVD  HEENT: No scleral icterus.  RESP: Nonlabored breathing on room air, no cough  CV: RRR by R radial pulse, noncyanotic   ABD: soft, non-tender, nondistended. No guarding or rebound tenderness  EXTREMITIES: Left upper extremity fistula with palpable thrill and dialysis this cannula is in place above the elbow.  Left hand is warm, normal color, normal capillary refill, though notably nonpalpable left radial and ulnar pulses  PSYCH: cooperative           Data:   All laboratory data reviewed    Results:  [facility]  Recent Labs   Lab 01/01/24  0813  12/31/23  0149    136   POTASSIUM 4.7 4.2   CHLORIDE 91* 90*   CO2 24 25   .5* 100.4*   CR 11.96* 10.60*   GLC 90 105*     CBC  Recent Labs   Lab 01/01/24 0813   WBC 3.5*   HGB 8.6*   PLT 63*     LFT  Recent Labs   Lab 01/01/24 0813   AST 17   ALT 10   ALKPHOS 178*   BILITOTAL 0.4   ALBUMIN 4.0     Recent Labs   Lab 01/01/24 0813 12/31/23  0149   GLC 90 105*       Imaging:  US Ext Arterial Venous Dialys Acs Graft    Result Date: 12/21/2023  ULTRASOUND EXTREMITY ARTERIAL VENOUS DIALYSIS ACCESS GRAFT 12/21/2023 10:18 AM HISTORY:  58-year-old patient with history of AV fistula in the left upper arm, created March 21, 2023 with revision August 15, 2023. COMPARISON: September 21, 2023. TECHNIQUE: Color Doppler and spectral waveform analysis obtained throughout the inflow brachial artery and outflow cephalic vein in the upper arm. FINDINGS: Inflow brachial artery ranges from 5-5.5 mm. Radial artery is noted proximally to be 5.1 mm and 139 cm/sec. AV anastomosis is patent. Total blood flow volume is 1126 mL/min. Diameters of the outflow vein range from 2.6 mm in the mid upper arm and 3.1 mm in the most central segment. Remaining diameters range from 4.9-8.1 mm.     IMPRESSION: 1. Patent left upper arm AV fistula. 2. Moderate stenoses, greatest degree in the mid upper arm outflow vein at 2.6 mm, as well as mild stenosis in the central-most outflow vein of 3.1 mm. Previously, most narrow area was 3.3 mm. ABEBA SIMONS MD   SYSTEM ID:  [id number]    XR Chest 2 Views    Result Date: 12/18/2023  XR CHEST 2 VIEWS 12/18/2023 2:43 PM HISTORY: cough 2 weeks, complex hx of PNA/sepsis last 2 years, coarse lungs; Acute cough COMPARISON: 10/6/2023 and chest CT on 10/31/2023     IMPRESSION: Linear opacities in the lung bases, likely due to scarring as seen on the CT of the abdomen and pelvis on 10/21/2023. Superimposed pneumonia is not excluded. Mild pleural thickening with calcifications in the right lung base. No  pleural effusion or pneumothorax. Mild cardiomegaly stable. Right IJ dialysis catheter tip in the low right atrium, stable. JULIAN PETE MD   SYSTEM ID:  [id number]    US Paracentesis with Albumin    Result Date: 12/7/2023  ULTRASOUND PARACENTESIS WITH ALBUMIN 12/7/2023 10:34 AM HISTORY: Post liver transplant, recurrent cirrhosis. Now with ascites, CKD on HD. Immunosuppression (H24). SBP (spontaneous bacterial peritonitis) (H). Other ascites. FINDINGS: Ultrasound was used to evaluate for the presence and best approach for paracentesis. Written and oral informed consent was obtained. A pause for the cause procedure to verify the correct patient and correct procedure. The skin overlying the right lower quadrant was prepped and draped in the usual sterile fashion. The subcutaneous tissues were anesthetized with 10 mL 1% lidocaine. A catheter was advanced into the peritoneal space and 0.475 L of straw colored fluid was drained. There were no immediate complications. Ultrasound images were permanently stored.  Patient left the ultrasound suite in satisfactory condition.     IMPRESSION: 1. Technically successful paracentesis without immediate complications. 2. Ultrasound guidance. HANS WESTON MD   SYSTEM ID:  [id number]

## 2024-01-02 NOTE — PROVIDER NOTIFICATION
MD Notification    Notified Person: MD    Notified Person Name: Richie    Notification Date/Time: 01/02 0219    Notification Interaction: Vocera     Purpose of Notification: Pt complaining of severe pain in his left arm where fistula infiltrated. PRNs utilized and not effective     Orders Received: IV dilaudid 0.2mg q2 PRN.     Comments:

## 2024-01-02 NOTE — CONSULTS
Received provider order --> Patient on renal diet, states he has reviewed with nutrition in past and has gotten option for different meals but now not able to order meals per his choices     Chart reviewed, patient is well known to our services    Eating well per flowsheets, no weight loss noted   Already receiving BID supplements     Will have Nutrition Associate see for meal selection    Please re-consult should nutrition issues arise    Martha Sandoval, RD, LD, CNSC   Clinical Dietitian - [facility]

## 2024-01-02 NOTE — PROGRESS NOTES
Potassium   Date Value Ref Range Status   01/01/2024 4.7 3.4 - 5.3 mmol/L Final   12/29/2022 3.4 3.4 - 5.3 mmol/L Final   04/20/2020 3.9 3.4 - 5.3 mmol/L Final     Potassium POCT   Date Value Ref Range Status   10/05/2023 3.6 3.4 - 5.3 mmol/L Final     Hemoglobin   Date Value Ref Range Status   01/01/2024 8.6 (L) 13.3 - 17.7 g/dL Final   04/20/2020 14.3 13.3 - 17.7 g/dL Final     Creatinine   Date Value Ref Range Status   01/01/2024 11.96 (H) 0.67 - 1.17 mg/dL Final   04/20/2020 1.06 0.66 - 1.25 mg/dL Final     Urea Nitrogen   Date Value Ref Range Status   01/01/2024 124.5 (H) 8.0 - 23.0 mg/dL Final   12/29/2022 46 (H) 7 - 30 mg/dL Final   04/20/2020 23 7 - 30 mg/dL Final     Sodium   Date Value Ref Range Status   01/01/2024 135 135 - 145 mmol/L Final     Comment:     Reference intervals for this test were updated on 09/26/2023 to more accurately reflect our healthy population. There may be differences in the flagging of prior results with similar values performed with this method. Interpretation of those prior results can be made in the context of the updated reference intervals.    04/20/2020 139 133 - 144 mmol/L Final     INR   Date Value Ref Range Status   10/27/2023 1.67 (H) 0.85 - 1.15 Final   10/07/2019 1.20 (H) 0.86 - 1.14 Final       DIALYSIS PROCEDURE NOTE  Hepatitis status of previous patient on machine log was checked and verified ok to use with this patients hepatitis status.     Patient dialyzed for 3.5 hrs. on a K2 bath with a net fluid removal of  2.4L.    A BFR of 250 ml/min was obtained via a left avf using 17 gauge needles.        The treatment plan was discussed with Dr. Fink during the treatment.       Total heparin received during the treatment: 0 units.     Needle cannulation sites held x 10 min.         Meds  given: venofer 100 mg, albumin 250ml prime, and midodrine 10mg   Complications: none       Person educated: patient and wife. Knowledge base substantial. Barriers to learning: none.  Educated on procedure via verbal mode. The patient/family verbalized understanding. Pt prefers verbal education style.      ICEBOAT? Timeout performed pre-treatment  I: Patient was identified using 2 identifiers  C:  Consent Signed Yes  E: Equipment preventative maintenance is current and dialysis delivery system OK to use  B: Hepatitis B Surface Antigen: negative; Draw Date: 12/06/23      Hepatitis B Surface Antibody: susceptible; Draw Date: 01/01/2023  O: Dialysis orders present and complete prior to treatment  A: Vascular access verified and assessed prior to treatment  T: Treatment was performed at a clinically appropriate time  ?: Patient was allowed to ask questions and address concerns prior to treatment  See Adult Hemodialysis flowsheet in [facility] for further details and post assessment.  Machine water alarm in place and functioning. Transducer pods intact and checked every 15min.   Pt returned via stretcher.  Chlorine/Chloramine water system checked every 4 hours.  Outpatient Dialysis at [facility]     Patient repositioned every 2 hours during the treatment.  Post treatment report given to GERTRUDE West RN regarding 2.4L of fluid removed, last BP of 126/78, and patient pain rating of 8/10.       Please remove patient dressing on AVF and AVG needle sites 24 hours after dialysis. If leaking occurs please apply a Band-Aid.

## 2024-01-02 NOTE — PROGRESS NOTES
[facility]    Medicine Progress Note - Hospitalist Service    Date of Admission:  1/1/2024    Assessment & Plan     Blanco Osborne is a 58 year old male , with a PMH ESRD on HD w L UE fistula and recently removed Jungular Tunneled cath, liver cirrhosis s/p transplant, portal hypertension, SBP, PE, A-fib, on DOAC, seizures, CHRISTIAN not on CPAP, who was admitted on 1/1/2024., for urgent hemodialysis and need for fistula reevaluation vs new central access need.     Discussed with ED MD-presented to ED for evaluation of dialysis fistula with pain.  Last HD was 5 days before admission.  Has had 2 ED visits since w request for hemodialysis.  Deferred till today.  Presented to outpatient hemodialysis today, they did not feel comfortable accessing, and so sent to the ED.  Admitted for evaluation of fistulaand need for repeat placement of Tunneled cath        ESRD on HD, with missed HD  Fistual complication  Hx left AV fistula pseudoaneurysm repair, 8/2023, 12/21/23  HTN w hx chronic hypotension, on midodrine    ESRD . Here w fistula access concern and missed HD,  At this time using fistula which has had multiple past access issues noted in mid LUE AV fistula stenosis per Dr. Mendoza.  follows w Dr Bradshaw for nephrology through the [facility].He dialyzes at [facility] . pt last completed HD was 12/27/23, unsuccessful HD attempt 12/30 was aborted due to hypotension, evaluated at  [facility] ED, and later same day also with [facility] ED, renal aware, urgent dialysis deferred. presented 1/1/2024 outpatient HD but fistula access declined by hemodialysis site and sent to ED.  Patient endorsing MALDONADO x 2-3 days with volume overload sx.  Mild weakness, and  (baseline 39), creatinine 11.96.     -Nephrology  consulted and following, was able to access aVF and dialyze 1/1 and 1/2.  -Vascular surgery consulted and evaluated patient, arterial Doppler ordered.  -Will get nonvascular ultrasound as well to evaluate  for any hematoma   -PTA midodrine, renal vitamin, sevelamer resumed  -Pain control, p.o. and IV meds      Hx Liver transplant 2016 with recurrent cirrhosis with evidence of portal hypertension and ascites   Hx Spontaneous Bacterial Peritonitis  11/1/23  Per last GI note, s/p 2016 liver transplantation for metabolic dysfunction associated steatotic liver disease . Saw GI last month. LFTs ow likely mild cirrhosis .  Patient without any abdominal distention, denies any fevers chills or infectious symptoms.   No   -Resumed PTA prophylactic ciprofloxacin 250mg daily (renally dosed)   -Hepatology follow up outpatient as scheduled with Dr. Simms     Chronic anemia  Leukopenia  Acute on chronic thrombocytopenia  Baseline hemoglobin 7, presented with hemoglobin of 8.  Also baseline hemoglobin 110-120s, presented with 63.   -All labs are pending today including CBC BMP  -Peripheral smear, B12 and folate ordered  -If platelet count drops further, will consult hematology, also for recommendation regarding continuation of anticoagulation      Hx DVT, PE, dx 10/5/2023   Atrial fibrillation on Eliquis  Hx AF w wide complex tachycardia 10/2023 admit, w failed cardioversion x 2.  Heart rate sinus on arrival.  No RVR signs.  - PTA eliquis twice daily resumed  - No PTA beta-blocker secondary to hypotension.     Hx completed embolic stroke, 11/2022    - PTA DOAC (no ASA). PTA statin      Seizure disorder  - continue Vimpat     Hx Recurrent pericardial effusion   Past hospitalizations reviewed,12/18 last chest x-ray reviewed.    Generalized anxiety disorder  -PTA gabapentin 300 at bedtime  -PTA as needed hydroxyzine     Restless leg syndrome  -PTA ropinirole only used occasionally, not helpful  -PTA as needed gabapentin helps     GERD  -Continue PPI     Hx Moderate protein-energy malnutrition     Consider repeat Dietary consult     Probable CHRISTIAN-  NOT on CPAP,   --Per patient's wife, he had a sleep study about 10 years ago but does not  have any CPAP or BiPAP at home.  Patient confirms he does not have CPAP and has not yet been able to schedule the sleep study.  Thus untreated.  -Monitor for nocturnal hypoxia.  -needs to schedule sleep study as outpatient after discharge    History of prolonged hospitalization [facility] for CAP (*see 10/5/23) d/c summary, had prolonged TCU stay and now able to walk gain.           Diet: Renal Diet (dialysis)  Snacks/Supplements Adult: Gelatein Plus; Between Meals  Snacks/Supplements Adult: Ensure Clear; With Meals    DVT Prophylaxis: DOAC  Nixon Catheter: Not present  Lines: None     Cardiac Monitoring: None  Code Status: Full Code      Clinically Significant Risk Factors             # Anion Gap Metabolic Acidosis: Highest Anion Gap = 20 mmol/L in last 2 days, will monitor and treat as appropriate    # Thrombocytopenia: Lowest platelets = 63 in last 2 days, will monitor for bleeding   # Hypertension: Noted on problem list        # Overweight: Estimated body mass index is 27.12 kg/m  as calculated from the following:    Height as of this encounter: 1.829 m (6').    Weight as of this encounter: 90.7 kg (200 lb)., PRESENT ON ADMISSION     # Financial/Environmental Concerns:           Disposition Plan     Expected Discharge Date: 01/03/2024                    Светлана Noble MD  Hospitalist Service  [facility]  Securely message with [employer] (more info)  Text page via [employer] Paging/Directory   ______________________________________________________________________    Interval History     Correct reviewed, evaluated patient this morning while in dialysis and all followed up this afternoon once dialysis completed.  Reports ongoing left arm pain, controlled but needing IV Dilaudid.  Denies tingling or numbness distally in the forearm/hand, no pain distally.  Shortness of breath is resolved    Physical Exam   Vital Signs: Temp: 97.8  F (36.6  C) Temp src: Oral BP: 111/76 Pulse: 65   Resp: 14 SpO2: 99 % O2  Device: None (Room air)    Weight: 200 lbs 0 oz    General: AAOx3, appears comfortable.  HEENT: PERRLA EOMI. Mucosa moist.   Lungs: Bilateral equal air entry. Clear to auscultation, normal work of breathing.   CVS: S1S2 regular, no tachycardia or murmur.   Abdomen: Soft, NT, ND. BS heard.  MSK: Lt arm with AVF with bruit. It is swollen and tender. No erythema although faint bruise/hyperpigmentation noted.   Neuro: AAOX3. CN 2-12 normal. Strength symmetrical.  Skin: No rash.   Patient also has small hematoma at the Rt chest wall, prior catheter site.      Medical Decision Making       60 MINUTES SPENT BY ME on the date of service doing chart review, history, exam, documentation & further activities per the note.      Data         Imaging results reviewed over the past 24 hrs:   No results found for this or any previous visit (from the past 24 hour(s)).

## 2024-01-02 NOTE — TELEPHONE ENCOUNTER
Pt' wife called and stated they recently saw Dr. Simms who is supportive of proceeding w/ pre-kidney transplant evaluation. They would like to come on 1/11/24. Orders entered.

## 2024-01-02 NOTE — PROGRESS NOTES
Vascular Surgery Progress Note    Subjective:  Still some pain in the left arm. Able to be dialyzed yesterday.    Objective:    *Vitals, labs, intake and output, pertinent imaging, and other pertinent studies were reviewed*    Gen: NAD  HEENT: NCAT  Vascular: Good thrill. 1+ left radial pulse. No expanding hematoma noted  Neuro: Moving all extremities  Psych: Cooperative    Assessment and Plan:  58 year old male  with ESRD on hemodialysis via proximal left radial artery to cephalic vein AV fistula and recent removal of right IJ tunneled catheter seen in consultation today after fistula was infiltrated on 12/30/23. Due to this and the holiday schedule he has not dialyzed in 6 days.    He was able to be dialyzed through [facility] yesterday. Plan for another session today.    - recommend ace wrap after HD for edema and discomfort  - duplex to be done before discharge    Andria Klein MD  Vascular Surgery Fellow        /71 (BP Location: Right arm, Patient Position: Semi-Salgado's, Cuff Size: Adult Regular)   Pulse 66   Temp 97.6  F (36.4  C) (Oral)   Resp 16   Ht 1.829 m (6')   Wt 90.7 kg (200 lb)   SpO2 95%   BMI 27.12 kg/m       Intake/Output Summary (Last 24 hours) at 1/2/2024 0727  Last data filed at 1/1/2024 1700  Gross per 24 hour   Intake 200 ml   Output 2600 ml   Net -2400 ml

## 2024-01-02 NOTE — PLAN OF CARE
Goal Outcome Evaluation:  Summary:  Hemodiaylsis, infiltrated fistula   DATE & TIME: 01/02 6412-2187  Cognitive Concerns/ Orientation : A&Ox4   BEHAVIOR & AGGRESSION TOOL COLOR: A&Ox4  CIWA SCORE: NA   ABNL VS/O2: VSS on RA- keon at times  MOBILITY: Independent  PAIN MANAGMENT: c/o left arm pain d/t infiltrated fistula. dilaudid IV q2 PRN given x2.   DIET: Renal   BOWEL/BLADDER: Continent of bowel- hemodialysis- producing very minimal urine  ABNL LAB/BG: CR- 11.96, Hgb 8.6. AM lab pending. Lab reminded to draw.   DRAIN/DEVICES: PIV SL   TELEMETRY RHYTHM: NA  SKIN: L arm +2 edema- sensation intact. Dialysis port was removed on right chest few weeks ago, small hematoma present. Dressing CDI. Dialysis M,W,F.  TESTS/PROCEDURES: duplex US pending, dialysis  D/C DAY/GOALS/PLACE: pending   OTHER IMPORTANT INFO: Plan for dialysis again. Will have another one tomorrow.

## 2024-01-03 ENCOUNTER — ANESTHESIA (OUTPATIENT)
Dept: SURGERY | Facility: CLINIC | Age: 59
DRG: 252 | End: 2024-01-03
Payer: COMMERCIAL

## 2024-01-03 ENCOUNTER — ANESTHESIA EVENT (OUTPATIENT)
Dept: SURGERY | Facility: CLINIC | Age: 59
DRG: 252 | End: 2024-01-03
Payer: COMMERCIAL

## 2024-01-03 ENCOUNTER — APPOINTMENT (OUTPATIENT)
Dept: INTERVENTIONAL RADIOLOGY/VASCULAR | Facility: CLINIC | Age: 59
DRG: 252 | End: 2024-01-03
Attending: SURGERY
Payer: COMMERCIAL

## 2024-01-03 LAB
ABO/RH(D): NORMAL
ANION GAP SERPL CALCULATED.3IONS-SCNC: 11 MMOL/L (ref 7–15)
ANTIBODY SCREEN: NEGATIVE
BASOPHILS # BLD AUTO: 0 10E3/UL (ref 0–0.2)
BASOPHILS NFR BLD AUTO: 1 %
BUN SERPL-MCNC: 54.6 MG/DL (ref 8–23)
CALCIUM SERPL-MCNC: 9.7 MG/DL (ref 8.6–10)
CHLORIDE SERPL-SCNC: 91 MMOL/L (ref 98–107)
CREAT SERPL-MCNC: 6.34 MG/DL (ref 0.67–1.17)
DEPRECATED HCO3 PLAS-SCNC: 32 MMOL/L (ref 22–29)
EGFRCR SERPLBLD CKD-EPI 2021: 9 ML/MIN/1.73M2
EOSINOPHIL # BLD AUTO: 0.3 10E3/UL (ref 0–0.7)
EOSINOPHIL NFR BLD AUTO: 8 %
ERYTHROCYTE [DISTWIDTH] IN BLOOD BY AUTOMATED COUNT: 14.8 % (ref 10–15)
FOLATE SERPL-MCNC: 27.6 NG/ML (ref 4.6–34.8)
GLUCOSE SERPL-MCNC: 88 MG/DL (ref 70–99)
HCT VFR BLD AUTO: 27.4 % (ref 40–53)
HGB BLD-MCNC: 8.6 G/DL (ref 13.3–17.7)
IMM GRANULOCYTES # BLD: 0 10E3/UL
IMM GRANULOCYTES NFR BLD: 0 %
LYMPHOCYTES # BLD AUTO: 1.4 10E3/UL (ref 0.8–5.3)
LYMPHOCYTES NFR BLD AUTO: 37 %
MCH RBC QN AUTO: 29.9 PG (ref 26.5–33)
MCHC RBC AUTO-ENTMCNC: 31.4 G/DL (ref 31.5–36.5)
MCV RBC AUTO: 95 FL (ref 78–100)
MONOCYTES # BLD AUTO: 0.6 10E3/UL (ref 0–1.3)
MONOCYTES NFR BLD AUTO: 15 %
NEUTROPHILS # BLD AUTO: 1.5 10E3/UL (ref 1.6–8.3)
NEUTROPHILS NFR BLD AUTO: 39 %
NRBC # BLD AUTO: 0 10E3/UL
NRBC BLD AUTO-RTO: 0 /100
PATH REPORT.COMMENTS IMP SPEC: NORMAL
PATH REPORT.FINAL DX SPEC: NORMAL
PATH REPORT.MICROSCOPIC SPEC OTHER STN: NORMAL
PATH REPORT.MICROSCOPIC SPEC OTHER STN: NORMAL
PLATELET # BLD AUTO: 76 10E3/UL (ref 150–450)
POTASSIUM SERPL-SCNC: 4.4 MMOL/L (ref 3.4–5.3)
RBC # BLD AUTO: 2.88 10E6/UL (ref 4.4–5.9)
SODIUM SERPL-SCNC: 134 MMOL/L (ref 135–145)
SPECIMEN EXPIRATION DATE: NORMAL
VIT B12 SERPL-MCNC: 948 PG/ML (ref 232–1245)
WBC # BLD AUTO: 3.8 10E3/UL (ref 4–11)

## 2024-01-03 PROCEDURE — 258N000003 HC RX IP 258 OP 636: Performed by: SURGERY

## 2024-01-03 PROCEDURE — 03UC07Z SUPPLEMENT LEFT RADIAL ARTERY WITH AUTOLOGOUS TISSUE SUBSTITUTE, OPEN APPROACH: ICD-10-PCS | Performed by: SURGERY

## 2024-01-03 PROCEDURE — 999N000141 HC STATISTIC PRE-PROCEDURE NURSING ASSESSMENT: Performed by: SURGERY

## 2024-01-03 PROCEDURE — 99152 MOD SED SAME PHYS/QHP 5/>YRS: CPT

## 2024-01-03 PROCEDURE — 250N000011 HC RX IP 250 OP 636: Performed by: ANESTHESIOLOGY

## 2024-01-03 PROCEDURE — 80048 BASIC METABOLIC PNL TOTAL CA: CPT | Performed by: HOSPITALIST

## 2024-01-03 PROCEDURE — 250N000013 HC RX MED GY IP 250 OP 250 PS 637: Performed by: STUDENT IN AN ORGANIZED HEALTH CARE EDUCATION/TRAINING PROGRAM

## 2024-01-03 PROCEDURE — 250N000012 HC RX MED GY IP 250 OP 636 PS 637: Performed by: EMERGENCY MEDICINE

## 2024-01-03 PROCEDURE — 87340 HEPATITIS B SURFACE AG IA: CPT | Performed by: INTERNAL MEDICINE

## 2024-01-03 PROCEDURE — 250N000012 HC RX MED GY IP 250 OP 636 PS 637: Performed by: STUDENT IN AN ORGANIZED HEALTH CARE EDUCATION/TRAINING PROGRAM

## 2024-01-03 PROCEDURE — 250N000025 HC SEVOFLURANE, PER MIN: Performed by: SURGERY

## 2024-01-03 PROCEDURE — 05BF0ZZ EXCISION OF LEFT CEPHALIC VEIN, OPEN APPROACH: ICD-10-PCS | Performed by: SURGERY

## 2024-01-03 PROCEDURE — 250N000011 HC RX IP 250 OP 636: Performed by: SURGERY

## 2024-01-03 PROCEDURE — 36415 COLL VENOUS BLD VENIPUNCTURE: CPT | Performed by: HOSPITALIST

## 2024-01-03 PROCEDURE — 76937 US GUIDE VASCULAR ACCESS: CPT

## 2024-01-03 PROCEDURE — 250N000009 HC RX 250: Performed by: ANESTHESIOLOGY

## 2024-01-03 PROCEDURE — 360N000076 HC SURGERY LEVEL 3, PER MIN: Performed by: SURGERY

## 2024-01-03 PROCEDURE — 0JH63XZ INSERTION OF TUNNELED VASCULAR ACCESS DEVICE INTO CHEST SUBCUTANEOUS TISSUE AND FASCIA, PERCUTANEOUS APPROACH: ICD-10-PCS | Performed by: RADIOLOGY

## 2024-01-03 PROCEDURE — 710N000009 HC RECOVERY PHASE 1, LEVEL 1, PER MIN: Performed by: SURGERY

## 2024-01-03 PROCEDURE — 86900 BLOOD TYPING SEROLOGIC ABO: CPT | Performed by: HOSPITALIST

## 2024-01-03 PROCEDURE — 250N000009 HC RX 250: Performed by: SURGERY

## 2024-01-03 PROCEDURE — 258N000003 HC RX IP 258 OP 636: Performed by: ANESTHESIOLOGY

## 2024-01-03 PROCEDURE — 250N000013 HC RX MED GY IP 250 OP 250 PS 637: Performed by: EMERGENCY MEDICINE

## 2024-01-03 PROCEDURE — 05QA0ZZ REPAIR LEFT BRACHIAL VEIN, OPEN APPROACH: ICD-10-PCS | Performed by: SURGERY

## 2024-01-03 PROCEDURE — C1750 CATH, HEMODIALYSIS,LONG-TERM: HCPCS

## 2024-01-03 PROCEDURE — 99232 SBSQ HOSP IP/OBS MODERATE 35: CPT | Performed by: HOSPITALIST

## 2024-01-03 PROCEDURE — 272N000196 HC ACCESSORY CR5

## 2024-01-03 PROCEDURE — 85025 COMPLETE CBC W/AUTO DIFF WBC: CPT | Performed by: HOSPITALIST

## 2024-01-03 PROCEDURE — 272N000001 HC OR GENERAL SUPPLY STERILE: Performed by: SURGERY

## 2024-01-03 PROCEDURE — 120N000001 HC R&B MED SURG/OB

## 2024-01-03 PROCEDURE — 250N000011 HC RX IP 250 OP 636: Performed by: PHYSICIAN ASSISTANT

## 2024-01-03 PROCEDURE — 370N000017 HC ANESTHESIA TECHNICAL FEE, PER MIN: Performed by: SURGERY

## 2024-01-03 PROCEDURE — 86923 COMPATIBILITY TEST ELECTRIC: CPT | Performed by: HOSPITALIST

## 2024-01-03 PROCEDURE — 99232 SBSQ HOSP IP/OBS MODERATE 35: CPT | Performed by: INTERNAL MEDICINE

## 2024-01-03 PROCEDURE — 250N000013 HC RX MED GY IP 250 OP 250 PS 637: Performed by: PHYSICIAN ASSISTANT

## 2024-01-03 PROCEDURE — 99232 SBSQ HOSP IP/OBS MODERATE 35: CPT | Mod: 57 | Performed by: SURGERY

## 2024-01-03 PROCEDURE — 76998 US GUIDE INTRAOP: CPT | Mod: 26 | Performed by: SURGERY

## 2024-01-03 PROCEDURE — 02H633Z INSERTION OF INFUSION DEVICE INTO RIGHT ATRIUM, PERCUTANEOUS APPROACH: ICD-10-PCS | Performed by: RADIOLOGY

## 2024-01-03 PROCEDURE — C1769 GUIDE WIRE: HCPCS

## 2024-01-03 PROCEDURE — 250N000011 HC RX IP 250 OP 636: Performed by: RADIOLOGY

## 2024-01-03 PROCEDURE — 250N000009 HC RX 250: Performed by: PHYSICIAN ASSISTANT

## 2024-01-03 PROCEDURE — 36832 AV FISTULA REVISION OPEN: CPT | Mod: LT | Performed by: SURGERY

## 2024-01-03 PROCEDURE — 03CC0ZZ EXTIRPATION OF MATTER FROM LEFT RADIAL ARTERY, OPEN APPROACH: ICD-10-PCS | Performed by: SURGERY

## 2024-01-03 PROCEDURE — 250N000011 HC RX IP 250 OP 636: Performed by: HOSPITALIST

## 2024-01-03 RX ORDER — CEFAZOLIN SODIUM 2 G/100ML
2 INJECTION, SOLUTION INTRAVENOUS EVERY 24 HOURS
Status: COMPLETED | OUTPATIENT
Start: 2024-01-04 | End: 2024-01-04

## 2024-01-03 RX ORDER — GINSENG 100 MG
CAPSULE ORAL PRN
Status: DISCONTINUED | OUTPATIENT
Start: 2024-01-03 | End: 2024-01-03 | Stop reason: HOSPADM

## 2024-01-03 RX ORDER — OXYCODONE HYDROCHLORIDE 5 MG/1
5 TABLET ORAL EVERY 4 HOURS PRN
Status: DISCONTINUED | OUTPATIENT
Start: 2024-01-03 | End: 2024-01-06 | Stop reason: HOSPADM

## 2024-01-03 RX ORDER — HEPARIN SODIUM 1000 [USP'U]/ML
4000 INJECTION, SOLUTION INTRAVENOUS; SUBCUTANEOUS ONCE
Status: COMPLETED | OUTPATIENT
Start: 2024-01-03 | End: 2024-01-03

## 2024-01-03 RX ORDER — HYDROMORPHONE HCL IN WATER/PF 6 MG/30 ML
0.3 PATIENT CONTROLLED ANALGESIA SYRINGE INTRAVENOUS
Status: DISCONTINUED | OUTPATIENT
Start: 2024-01-03 | End: 2024-01-06 | Stop reason: HOSPADM

## 2024-01-03 RX ORDER — FENTANYL CITRATE 50 UG/ML
25 INJECTION, SOLUTION INTRAMUSCULAR; INTRAVENOUS EVERY 5 MIN PRN
Status: DISCONTINUED | OUTPATIENT
Start: 2024-01-03 | End: 2024-01-03

## 2024-01-03 RX ORDER — ONDANSETRON 2 MG/ML
INJECTION INTRAMUSCULAR; INTRAVENOUS PRN
Status: DISCONTINUED | OUTPATIENT
Start: 2024-01-03 | End: 2024-01-03

## 2024-01-03 RX ORDER — ONDANSETRON 2 MG/ML
4 INJECTION INTRAMUSCULAR; INTRAVENOUS EVERY 30 MIN PRN
Status: DISCONTINUED | OUTPATIENT
Start: 2024-01-03 | End: 2024-01-03

## 2024-01-03 RX ORDER — NALOXONE HYDROCHLORIDE 0.4 MG/ML
0.2 INJECTION, SOLUTION INTRAMUSCULAR; INTRAVENOUS; SUBCUTANEOUS
Status: DISCONTINUED | OUTPATIENT
Start: 2024-01-03 | End: 2024-01-03 | Stop reason: HOSPADM

## 2024-01-03 RX ORDER — HYDROMORPHONE HCL IN WATER/PF 6 MG/30 ML
0.4 PATIENT CONTROLLED ANALGESIA SYRINGE INTRAVENOUS EVERY 5 MIN PRN
Status: DISCONTINUED | OUTPATIENT
Start: 2024-01-03 | End: 2024-01-03

## 2024-01-03 RX ORDER — HEPARIN SODIUM 1000 [USP'U]/ML
INJECTION, SOLUTION INTRAVENOUS; SUBCUTANEOUS PRN
Status: DISCONTINUED | OUTPATIENT
Start: 2024-01-03 | End: 2024-01-03 | Stop reason: HOSPADM

## 2024-01-03 RX ORDER — DEXAMETHASONE SODIUM PHOSPHATE 4 MG/ML
INJECTION, SOLUTION INTRA-ARTICULAR; INTRALESIONAL; INTRAMUSCULAR; INTRAVENOUS; SOFT TISSUE PRN
Status: DISCONTINUED | OUTPATIENT
Start: 2024-01-03 | End: 2024-01-03

## 2024-01-03 RX ORDER — FENTANYL CITRATE 0.05 MG/ML
25-50 INJECTION, SOLUTION INTRAMUSCULAR; INTRAVENOUS EVERY 5 MIN PRN
Status: DISCONTINUED | OUTPATIENT
Start: 2024-01-03 | End: 2024-01-03 | Stop reason: HOSPADM

## 2024-01-03 RX ORDER — PROPOFOL 10 MG/ML
INJECTION, EMULSION INTRAVENOUS PRN
Status: DISCONTINUED | OUTPATIENT
Start: 2024-01-03 | End: 2024-01-03

## 2024-01-03 RX ORDER — SODIUM CHLORIDE, SODIUM LACTATE, POTASSIUM CHLORIDE, CALCIUM CHLORIDE 600; 310; 30; 20 MG/100ML; MG/100ML; MG/100ML; MG/100ML
INJECTION, SOLUTION INTRAVENOUS CONTINUOUS
Status: DISCONTINUED | OUTPATIENT
Start: 2024-01-03 | End: 2024-01-03

## 2024-01-03 RX ORDER — CEFAZOLIN SODIUM 500 MG/2.2ML
INJECTION, POWDER, FOR SOLUTION INTRAMUSCULAR; INTRAVENOUS PRN
Status: DISCONTINUED | OUTPATIENT
Start: 2024-01-03 | End: 2024-01-03

## 2024-01-03 RX ORDER — CEFAZOLIN SODIUM 2 G/100ML
2 INJECTION, SOLUTION INTRAVENOUS
Status: COMPLETED | OUTPATIENT
Start: 2024-01-03 | End: 2024-01-03

## 2024-01-03 RX ORDER — NALOXONE HYDROCHLORIDE 0.4 MG/ML
0.4 INJECTION, SOLUTION INTRAMUSCULAR; INTRAVENOUS; SUBCUTANEOUS
Status: DISCONTINUED | OUTPATIENT
Start: 2024-01-03 | End: 2024-01-03 | Stop reason: HOSPADM

## 2024-01-03 RX ORDER — LIDOCAINE 40 MG/G
CREAM TOPICAL
Status: DISCONTINUED | OUTPATIENT
Start: 2024-01-03 | End: 2024-01-06 | Stop reason: HOSPADM

## 2024-01-03 RX ORDER — ONDANSETRON 4 MG/1
4 TABLET, ORALLY DISINTEGRATING ORAL EVERY 30 MIN PRN
Status: DISCONTINUED | OUTPATIENT
Start: 2024-01-03 | End: 2024-01-03

## 2024-01-03 RX ORDER — EPHEDRINE SULFATE 50 MG/ML
INJECTION, SOLUTION INTRAMUSCULAR; INTRAVENOUS; SUBCUTANEOUS PRN
Status: DISCONTINUED | OUTPATIENT
Start: 2024-01-03 | End: 2024-01-03

## 2024-01-03 RX ORDER — HYDROMORPHONE HCL IN WATER/PF 6 MG/30 ML
0.2 PATIENT CONTROLLED ANALGESIA SYRINGE INTRAVENOUS EVERY 5 MIN PRN
Status: DISCONTINUED | OUTPATIENT
Start: 2024-01-03 | End: 2024-01-03

## 2024-01-03 RX ORDER — FENTANYL CITRATE 50 UG/ML
INJECTION, SOLUTION INTRAMUSCULAR; INTRAVENOUS PRN
Status: DISCONTINUED | OUTPATIENT
Start: 2024-01-03 | End: 2024-01-03

## 2024-01-03 RX ORDER — FENTANYL CITRATE 50 UG/ML
50 INJECTION, SOLUTION INTRAMUSCULAR; INTRAVENOUS EVERY 5 MIN PRN
Status: DISCONTINUED | OUTPATIENT
Start: 2024-01-03 | End: 2024-01-03

## 2024-01-03 RX ORDER — BUPIVACAINE HYDROCHLORIDE 5 MG/ML
INJECTION, SOLUTION PERINEURAL PRN
Status: DISCONTINUED | OUTPATIENT
Start: 2024-01-03 | End: 2024-01-03 | Stop reason: HOSPADM

## 2024-01-03 RX ORDER — LIDOCAINE HYDROCHLORIDE 20 MG/ML
INJECTION, SOLUTION INFILTRATION; PERINEURAL PRN
Status: DISCONTINUED | OUTPATIENT
Start: 2024-01-03 | End: 2024-01-03

## 2024-01-03 RX ORDER — CEFAZOLIN SODIUM/WATER 2 G/20 ML
2 SYRINGE (ML) INTRAVENOUS EVERY 8 HOURS
Status: DISCONTINUED | OUTPATIENT
Start: 2024-01-04 | End: 2024-01-03

## 2024-01-03 RX ORDER — CEFAZOLIN SODIUM/WATER 2 G/20 ML
2 SYRINGE (ML) INTRAVENOUS EVERY 8 HOURS
Qty: 40 ML | Refills: 0 | Status: DISCONTINUED | OUTPATIENT
Start: 2024-01-03 | End: 2024-01-03

## 2024-01-03 RX ORDER — FLUMAZENIL 0.1 MG/ML
0.2 INJECTION, SOLUTION INTRAVENOUS
Status: CANCELLED | OUTPATIENT
Start: 2024-01-03

## 2024-01-03 RX ORDER — SODIUM CHLORIDE 9 MG/ML
INJECTION, SOLUTION INTRAVENOUS CONTINUOUS
Status: DISCONTINUED | OUTPATIENT
Start: 2024-01-03 | End: 2024-01-05

## 2024-01-03 RX ORDER — HYDROMORPHONE HCL IN WATER/PF 6 MG/30 ML
0.2 PATIENT CONTROLLED ANALGESIA SYRINGE INTRAVENOUS ONCE
Status: COMPLETED | OUTPATIENT
Start: 2024-01-03 | End: 2024-01-03

## 2024-01-03 RX ADMIN — OXYCODONE HYDROCHLORIDE 5 MG: 5 TABLET ORAL at 20:45

## 2024-01-03 RX ADMIN — FENTANYL CITRATE 50 MCG: 50 INJECTION, SOLUTION INTRAMUSCULAR; INTRAVENOUS at 11:35

## 2024-01-03 RX ADMIN — ACETAMINOPHEN 650 MG: 325 TABLET, FILM COATED ORAL at 20:45

## 2024-01-03 RX ADMIN — SEVELAMER CARBONATE 800 MG: 800 TABLET, FILM COATED ORAL at 20:40

## 2024-01-03 RX ADMIN — DEXAMETHASONE SODIUM PHOSPHATE 4 MG: 4 INJECTION, SOLUTION INTRA-ARTICULAR; INTRALESIONAL; INTRAMUSCULAR; INTRAVENOUS; SOFT TISSUE at 16:27

## 2024-01-03 RX ADMIN — Medication 1 SPRAY: at 09:20

## 2024-01-03 RX ADMIN — TACROLIMUS 0.5 MG: 0.5 CAPSULE ORAL at 09:21

## 2024-01-03 RX ADMIN — Medication 1 SPRAY: at 12:55

## 2024-01-03 RX ADMIN — SODIUM CHLORIDE: 9 INJECTION, SOLUTION INTRAVENOUS at 14:57

## 2024-01-03 RX ADMIN — CEFAZOLIN SODIUM 2 G: 2 INJECTION, SOLUTION INTRAVENOUS at 11:35

## 2024-01-03 RX ADMIN — Medication 5 MG: at 17:20

## 2024-01-03 RX ADMIN — HYDROMORPHONE HYDROCHLORIDE 0.2 MG: 0.2 INJECTION, SOLUTION INTRAMUSCULAR; INTRAVENOUS; SUBCUTANEOUS at 15:30

## 2024-01-03 RX ADMIN — LACOSAMIDE 50 MG: 50 TABLET, FILM COATED ORAL at 09:21

## 2024-01-03 RX ADMIN — HYDROMORPHONE HYDROCHLORIDE 0.2 MG: 0.2 INJECTION, SOLUTION INTRAMUSCULAR; INTRAVENOUS; SUBCUTANEOUS at 12:57

## 2024-01-03 RX ADMIN — MIDODRINE HYDROCHLORIDE 10 MG: 5 TABLET ORAL at 12:55

## 2024-01-03 RX ADMIN — LIDOCAINE HYDROCHLORIDE 20 ML: 10 INJECTION, SOLUTION INFILTRATION; PERINEURAL at 11:44

## 2024-01-03 RX ADMIN — HYDROMORPHONE HYDROCHLORIDE 0.2 MG: 0.2 INJECTION, SOLUTION INTRAMUSCULAR; INTRAVENOUS; SUBCUTANEOUS at 09:24

## 2024-01-03 RX ADMIN — FENTANYL CITRATE 50 MCG: 50 INJECTION INTRAMUSCULAR; INTRAVENOUS at 17:34

## 2024-01-03 RX ADMIN — Medication 5 MG: at 17:07

## 2024-01-03 RX ADMIN — Medication 5 MG: at 18:31

## 2024-01-03 RX ADMIN — FENTANYL CITRATE 50 MCG: 50 INJECTION INTRAMUSCULAR; INTRAVENOUS at 16:40

## 2024-01-03 RX ADMIN — HEPARIN SODIUM 4000 UNITS: 1000 INJECTION INTRAVENOUS; SUBCUTANEOUS at 11:54

## 2024-01-03 RX ADMIN — HYDROMORPHONE HYDROCHLORIDE 0.2 MG: 0.2 INJECTION, SOLUTION INTRAMUSCULAR; INTRAVENOUS; SUBCUTANEOUS at 06:18

## 2024-01-03 RX ADMIN — TACROLIMUS 0.5 MG: 0.5 CAPSULE ORAL at 20:41

## 2024-01-03 RX ADMIN — Medication 5 MG: at 18:00

## 2024-01-03 RX ADMIN — ONDANSETRON 4 MG: 2 INJECTION INTRAMUSCULAR; INTRAVENOUS at 16:27

## 2024-01-03 RX ADMIN — Medication 5 MG: at 18:10

## 2024-01-03 RX ADMIN — MIDODRINE HYDROCHLORIDE 10 MG: 5 TABLET ORAL at 20:39

## 2024-01-03 RX ADMIN — MIDODRINE HYDROCHLORIDE 10 MG: 5 TABLET ORAL at 09:21

## 2024-01-03 RX ADMIN — Medication 1 CAPSULE: at 09:21

## 2024-01-03 RX ADMIN — PHENYLEPHRINE HYDROCHLORIDE 200 MCG: 10 INJECTION INTRAVENOUS at 16:20

## 2024-01-03 RX ADMIN — PHENYLEPHRINE HYDROCHLORIDE 0.5 MCG/KG/MIN: 10 INJECTION INTRAVENOUS at 16:20

## 2024-01-03 RX ADMIN — HYDROMORPHONE HYDROCHLORIDE 0.2 MG: 0.2 INJECTION, SOLUTION INTRAMUSCULAR; INTRAVENOUS; SUBCUTANEOUS at 04:12

## 2024-01-03 RX ADMIN — FENTANYL CITRATE 50 MCG: 50 INJECTION INTRAMUSCULAR; INTRAVENOUS at 17:53

## 2024-01-03 RX ADMIN — HYDROXYZINE HYDROCHLORIDE 25 MG: 25 TABLET, FILM COATED ORAL at 09:36

## 2024-01-03 RX ADMIN — FENTANYL CITRATE 25 MCG: 50 INJECTION INTRAMUSCULAR; INTRAVENOUS at 16:27

## 2024-01-03 RX ADMIN — PROPOFOL 200 MG: 10 INJECTION, EMULSION INTRAVENOUS at 16:10

## 2024-01-03 RX ADMIN — SEVELAMER CARBONATE 800 MG: 800 TABLET, FILM COATED ORAL at 09:21

## 2024-01-03 RX ADMIN — FENTANYL CITRATE 25 MCG: 50 INJECTION INTRAMUSCULAR; INTRAVENOUS at 16:10

## 2024-01-03 RX ADMIN — CEFAZOLIN 2 MG: 225 INJECTION, POWDER, FOR SOLUTION INTRAMUSCULAR; INTRAVENOUS at 16:04

## 2024-01-03 RX ADMIN — LIDOCAINE HYDROCHLORIDE 100 MG: 20 INJECTION, SOLUTION INFILTRATION; PERINEURAL at 16:10

## 2024-01-03 RX ADMIN — LACOSAMIDE 100 MG: 50 TABLET, FILM COATED ORAL at 20:40

## 2024-01-03 ASSESSMENT — ACTIVITIES OF DAILY LIVING (ADL)
ADLS_ACUITY_SCORE: 22
ADLS_ACUITY_SCORE: 29
ADLS_ACUITY_SCORE: 22
ADLS_ACUITY_SCORE: 26
DEPENDENT_IADLS:: INDEPENDENT
ADLS_ACUITY_SCORE: 22
ADLS_ACUITY_SCORE: 26
ADLS_ACUITY_SCORE: 26
ADLS_ACUITY_SCORE: 22
ADLS_ACUITY_SCORE: 22

## 2024-01-03 ASSESSMENT — ENCOUNTER SYMPTOMS: SEIZURES: 1

## 2024-01-03 NOTE — PROGRESS NOTES
VASCULAR SURGERY PROGRESS NOTE    Subjective:  Resting in bed, pain in his left arm has slightly worsened in 24 hour period.     Objective:  Intake/Output Summary (Last 24 hours) at 1/3/2024 0731  Last data filed at 1/2/2024 1155  Gross per 24 hour   Intake --   Output 2400 ml   Net -2400 ml     PHYSICAL EXAM:  /67 (BP Location: Right arm)   Pulse 65   Temp 98.4  F (36.9  C) (Oral)   Resp 16   Ht 1.829 m (6')   Wt 90.7 kg (200 lb)   SpO2 99%   BMI 27.12 kg/m    Alert and oriented x4, neurologically intact  Left CMS intact, left arm noticeably edematous   Found to have pseudoaneurysm and hematoma on US          ASSESSMENT:  Blanco Osborne is a 58 year old male who was admitted given suspected infiltration/pain of his AVF    PLAN:  -keep NPO except for medications, likely OR today versus tomorrow, will update chart once timing known  -continue elevation and ace wrap of his LUE  -continue current medications    Saima Coker NP  VASCULAR SURGERY

## 2024-01-03 NOTE — IR NOTE
[facility]  POST PROCEDURE NOTE    Date of Procedure: Today    Pre-Procedure Diagnosis: Renal failure    Post-Procedure Diagnosis: Renal failure  Procedure:  Right EXTERNAL Jugular 23 cm Palindrome HD catheter with tip in the RA.  Heparinized.  Ready for use.  No complications.    Type of Sedation: Sedation    Estimated Blood Loss: 5 ml    Specimen/Tissues Removed: None    Complications/Reactions:  None    Plan: Catheter ready for use.      Renny Plata MD   1/3/2024, 12:15 PM

## 2024-01-03 NOTE — ANESTHESIA PREPROCEDURE EVALUATION
Anesthesia Pre-Procedure Evaluation    Patient: Blanco Osborne   MRN: [id number] : 1965        Procedure : Procedure(s):  REVISION, ARTERIOVENOUS FISTULA, UPPER EXTREMITY, PSEUDOANEURYSM REPAIR AND HEMATOMA EVACUATION          Past Medical History:   Diagnosis Date    Acquired immunocompromised state (H24) 2022    Acute renal failure, unspecified acute renal failure type (H24) 2022    Antiplatelet or antithrombotic long-term use     Arrhythmia     PEA    Ascites     Aspergillus pneumonia (H) 2022    Cancer (H)     Squamous Cell Cancer- Since resolved    Cirrhosis of liver with ascites (H) 2016    Coagulopathy (H24)     Critical illness myopathy     Dialysis patient (H24)     DIALYSIS 3X/ WEEK    Embolic stroke (H) 2022    Encephalopathy     ESRD (end stage renal disease) on dialysis (H)     Gastroesophageal reflux disease     H/O alcohol abuse     History of blood transfusion     Hyperammonemia (H24)     Hyperlipidemia     Hypertension     Patients states that HTN has been resolved    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    Insomnia     Lactic acidosis 2022    Liver replaced by transplant (H) 2016    NAFLD (nonalcoholic fatty liver disease) 2016    CHRISTIAN on CPAP     Parainfluenza type 1 infection 2022    Parapneumonic effusion     Pleural effusion     Pneumothorax on left 2022    Respiratory acidosis 2022    Respiratory arrest (H) 2022    Restless legs syndrome (RLS)     SBP (spontaneous bacterial peritonitis) (H)     [facility]    Seizures (H) 2022    Sepsis due to Streptococcus pneumoniae with acute hypoxic respiratory failure (H) 2022    Stroke, embolic (H) 2022    Sudden cardiac arrest (H) 2022    PEA- 2 min of CPR    Tubular adenoma 10/2019    Large cecal adenoma- due for surveillance colonoscopy in 3 years (10/2022)      Past Surgical History:   Procedure Laterality Date    APPENDECTOMY      BENCH  LIVER N/A 09/20/2016    Procedure: BENCH LIVER;  Surgeon: Enoc Crews MD;  Location: [facility] OR    BRONCHOSCOPY FLEXIBLE AND RIGID N/A 12/20/2022    Procedure: BRONCHOSCOPY;  Surgeon: Alena Valenzuela MD;  Location:  GI    COLONOSCOPY  08/06/2013    repeat in 2018    COLONOSCOPY N/A 10/04/2019    Procedure: COLONOSCOPY, WITH POLYPECTOMY AND BIOPSY;  Surgeon: Go Chong MD;  Location: [facility] OR    CREATE FISTULA ARTERIOVENOUS UPPER EXTREMITY Left 03/21/2023    Procedure: LEFT UPPER EXTREMITY ARTERIOVENOUS FISTULA CREATION. LIGATION OF COMPETING VASCULAR BRANCHES- LEFT;  Surgeon: Deejay Mcneil MD;  Location:  OR    CV PERICARDIOCENTESIS N/A 9/29/2023    Procedure: Pericardiocentesis;  Surgeon: Barry Mckeon MD;  Location:  HEART CARDIAC CATH LAB    CV PERICARDIOCENTESIS N/A 10/11/2023    Procedure: Pericardiocentesis;  Surgeon: Ulises Bhakta MD;  Location:  HEART CARDIAC CATH LAB    CV RIGHT HEART CATH MEASUREMENTS RECORDED N/A 10/11/2023    Procedure: Right Heart Catheterization;  Surgeon: Ulises Bhakta MD;  Location:  HEART CARDIAC CATH LAB    HERNIA REPAIR      IR CHEST TUBE PLACEMENT NON-TUNNELED RIGHT  12/12/2022    IR CVC TUNNEL PLACEMENT > 5 YRS OF AGE  12/06/2022    IR DIALYSIS FISTULOGRAM LEFT  07/13/2023    IR GASTROSTOMY TUBE CHANGE  02/08/2023    IR GASTROSTOMY TUBE PERCUTANEOUS PLCMNT  11/29/2022    IR PARACENTESIS  11/29/2022    IR PARACENTESIS  12/01/2022    IR PARACENTESIS  2/10/2016    IR PARACENTESIS  2/28/2016    IR THORACENTESIS  12/06/2022    IR THORACENTESIS  09/01/2020    IR THORACENTESIS  08/10/2020    IR TRANSCATHETER BIOPSY  12/01/2022    REVISION FISTULA ARTERIOVENOUS UPPER EXTREMITY Left 8/15/2023    Procedure: 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 2; OUTFLOW REVISION CEPHALIC TO BRACHIAL VEIN;  Surgeon: Deejay Mcneil MD;  Location:  OR    TRACHEOSTOMY N/A 11/29/2022    Procedure: TRACHEOSTOMY;  Surgeon:  Nilesh Jackson MD;  Location:  OR    TRANSPLANT LIVER RECIPIENT  DONOR N/A 2016    Procedure: TRANSPLANT LIVER RECIPIENT  DONOR;  Surgeon: Enoc Crews MD;  Location: [facility] OR      No Known Allergies   Social History     Tobacco Use    Smoking status: Never    Smokeless tobacco: Never   Substance Use Topics    Alcohol use: Not Currently     Alcohol/week: 0.0 standard drinks of alcohol      Wt Readings from Last 1 Encounters:   24 86.5 kg (190 lb 11.2 oz)        Anesthesia Evaluation        History of anesthetic complications       ROS/MED HX  ENT/Pulmonary:     (+) sleep apnea,                                       Neurologic:     (+)       seizures,   CVA,    TIA,                  Cardiovascular:     (+) Dyslipidemia hypertension- -   -  - -   Taking blood thinners                                (-) CAD   METS/Exercise Tolerance:     Hematologic:  - neg hematologic  ROS     Musculoskeletal:  - neg musculoskeletal ROS     GI/Hepatic: Comment: Liver transplant.     (+) GERD,            liver disease,       Renal/Genitourinary:     (+) renal disease, type: ESRD, Pt requires dialysis,           Endo:       Psychiatric/Substance Use:       Infectious Disease:       Malignancy:       Other:            Physical Exam    Airway        Mallampati: III   TM distance: > 3 FB   Neck ROM: full   Mouth opening: > 3 cm    Respiratory Devices and Support         Dental       (+) Multiple visibly decayed, broken teeth      Cardiovascular   cardiovascular exam normal       Rhythm and rate: regular     Pulmonary   pulmonary exam normal        breath sounds clear to auscultation           OUTSIDE LABS:  CBC:   Lab Results   Component Value Date    WBC 3.8 (L) 2024    WBC 4.0 2024    HGB 8.6 (L) 2024    HGB 8.7 (L) 2024    HCT 27.4 (L) 2024    HCT 26.6 (L) 2024    PLT 76 (L) 2024    PLT 78 (L) 2024     BMP:   Lab Results   Component Value Date      (L) 01/03/2024     01/02/2024    POTASSIUM 4.4 01/03/2024    POTASSIUM 4.5 01/02/2024    CHLORIDE 91 (L) 01/03/2024    CHLORIDE 93 (L) 01/02/2024    CO2 32 (H) 01/03/2024    CO2 31 (H) 01/02/2024    BUN 54.6 (H) 01/03/2024    BUN 47.0 (H) 01/02/2024    CR 6.34 (H) 01/03/2024    CR 5.73 (H) 01/02/2024    GLC 88 01/03/2024    GLC 93 01/02/2024     COAGS:   Lab Results   Component Value Date    PTT 47 (H) 10/14/2023    INR 1.67 (H) 10/27/2023    FIBR 442 10/24/2023     POC:   Lab Results   Component Value Date     (H) 09/28/2016     HEPATIC:   Lab Results   Component Value Date    ALBUMIN 4.2 01/02/2024    PROTTOTAL 7.5 01/02/2024    ALT 12 01/02/2024    AST 22 01/02/2024    ALKPHOS 179 (H) 01/02/2024    BILITOTAL 0.8 01/02/2024    CHANDLER 37 10/30/2023     OTHER:   Lab Results   Component Value Date    PH 7.30 (L) 03/05/2023    LACT 0.4 10/11/2023    A1C 4.7 08/10/2023    KELLY 9.7 01/03/2024    PHOS 4.0 11/01/2023    MAG 2.1 10/05/2023    LIPASE 24 10/13/2023    AMYLASE 72 09/22/2016    TSH 4.89 (H) 03/25/2023    T4 0.92 03/25/2023    CRP 22.3 (H) 11/22/2022       Anesthesia Plan    ASA Status:  3    NPO Status:  NPO Appropriate    Anesthesia Type: General.     - Airway: LMA   Induction: Intravenous, Propofol.   Maintenance: Balanced.        Consents    Anesthesia Plan(s) and associated risks, benefits, and realistic alternatives discussed. Questions answered and patient/representative(s) expressed understanding.     - Discussed:     - Discussed with:  Patient      - Extended Intubation/Ventilatory Support Discussed: No.      - Patient is DNR/DNI Status: No     Use of blood products discussed: No .     Postoperative Care    Pain management: Multi-modal analgesia, IV analgesics.   PONV prophylaxis: Ondansetron (or other 5HT-3), Dexamethasone or Solumedrol     Comments:    Other Comments: Patient is counseled on the anesthesia plan and relevant anesthesia procedures including all risks and benefits.  All patient questions were answered.              Jem Anderson MD    I have reviewed the pertinent notes and labs in the chart from the past 30 days and (re)examined the patient.  Any updates or changes from those notes are reflected in this note.

## 2024-01-03 NOTE — CONSULTS
Care Management Initial Consult    General Information  Assessment completed with: Patient, Spouse or significant other, Spouse Ofe was on speaker phone  Type of CM/SW Visit: Offer D/C Planning    Primary Care Provider verified and updated as needed: Yes   Readmission within the last 30 days: no previous admission in last 30 days      Reason for Consult: discharge planning  Advance Care Planning: Advance Care Planning Reviewed: no concerns identified     General Information Comments: High readmission risk    Communication Assessment  Patient's communication style: spoken language (English or Bilingual)    Hearing Difficulty or Deaf: no   Wear Glasses or Blind: yes    Cognitive  Cognitive/Neuro/Behavioral: WDL  Level of Consciousness: alert  Arousal Level: opens eyes spontaneously  Orientation: oriented x 4  Mood/Behavior: calm, cooperative, behavior appropriate to situation  Best Language: 0 - No aphasia  Speech: clear    Living Environment:   People in home: spouse  Ofe  Current living Arrangements: condominium      Able to return to prior arrangements: yes  Living Arrangement Comments: has a condo and a home on [address] that is being remodeled    Family/Social Support:  Care provided by: self, spouse/significant other  Provides care for: no one  Marital Status:   Wife          Description of Support System: Involved    Support Assessment: Adequate family and caregiver support    Current Resources:   Patient receiving home care services: No     Community Resources: Dialysis Services  Equipment currently used at home: grab bar, toilet, grab bar, tub/shower, walker, standard (not currently using his walker)  Supplies currently used at home: None    Employment/Financial:  Employment Status: employed part-time     Employment/ Comments: Consulting  Financial Concerns: none           Does the patient's insurance plan have a 3 day qualifying hospital stay waiver?      Lifestyle & Psychosocial  Needs:  Social Determinants of Health     Food Insecurity: Low Risk  (11/2/2023)    Food Insecurity     Within the past 12 months, did you worry that your food would run out before you got money to buy more?: No     Within the past 12 months, did the food you bought just not last and you didn t have money to get more?: No   Depression: Not at risk (11/2/2023)    PHQ-2     PHQ-2 Score: 0   Housing Stability: Low Risk  (11/2/2023)    Housing Stability     Do you have housing? : Yes     Are you worried about losing your housing?: No   Tobacco Use: Low Risk  (12/21/2023)    Patient History     Smoking Tobacco Use: Never     Smokeless Tobacco Use: Never     Passive Exposure: Not on file   Financial Resource Strain: Low Risk  (11/2/2023)    Financial Resource Strain     Within the past 12 months, have you or your family members you live with been unable to get utilities (heat, electricity) when it was really needed?: No   Alcohol Use: Unknown (10/7/2019)    AUDIT-C     Frequency of Alcohol Consumption: Monthly or less     Average Number of Drinks: Not on file     Frequency of Binge Drinking: Not on file   Transportation Needs: Low Risk  (11/2/2023)    Transportation Needs     Within the past 12 months, has lack of transportation kept you from medical appointments, getting your medicines, non-medical meetings or appointments, work, or from getting things that you need?: No   Physical Activity: Not on file   Interpersonal Safety: Low Risk  (9/21/2023)    Interpersonal Safety     Do you feel physically and emotionally safe where you currently live?: Yes     Within the past 12 months, have you been hit, slapped, kicked or otherwise physically hurt by someone?: No     Within the past 12 months, have you been humiliated or emotionally abused in other ways by your partner or ex-partner?: No   Stress: Not on file   Social Connections: Not on file       Functional Status:  Prior to admission patient needed assistance:   Dependent  ADLs:: Independent  Dependent IADLs:: Independent       Mental Health Status:  Mental Health Status: No Current Concerns       Chemical Dependency Status:  Chemical Dependency Status: Past Concern             Values/Beliefs:  Spiritual, Cultural Beliefs, Scientologist Practices, Values that affect care: no               Additional Information:  Care Coordinator met with patient per automatic care management consult due to high readmission risk.  Patient has come a long way from a year ago, with admission here 1/21/23 to 4/28/23 and had come from [place].  Much of that hospitalization was with severe delirium requiring a sitter for much of that hospitalization.  Patient did go home with home care and has had steady improvement.  He is independent and a very good historian.  His spouse was a surgical nurse at [employer] and has recently retired.  She is very involved with patient's health affairs.  Patient has multiple co-morbidities that include paroxysmal atrial fibrillation on chronic anticoagulation, liver transplant 2016, on chronic immunosuppression, ESRD on dialysis.  Patient is starting the process of being worked up to see if he would be able to receive a kidney transplant.  He is currently on a M/W/F schedule at [place].  Patient is on the wait list for the [facility] unit.  Patient reports he favors the Nephrologist that covers this unit.  Patient follows with Dr Simms at [facility] for liver health, (transplant 2016) every six months and just saw him two weeks ago.  Patient was told his liver is now perfect.  Patient noted that in his records, it was noted that he currently has cirrhosis.  Patient would like that removed as this is not accurate.  It was most likely meant that he had a cirrhotic liver prior to his transplant.  In discussion with the two of them, no discharge needs have been identified.  Will discuss if patient needs any help in scheduling recommended follow-up  appointments.  Planning tunneled dialysis catheter placement and fistula revision surgery this afternoon.    Care Management will continue to follow.    Rosalba Yoo RN  Inpatient Care Management  [phone]

## 2024-01-03 NOTE — ANESTHESIA PROCEDURE NOTES
Airway       Patient location during procedure: OR  Staff -        CRNA: Elodia Jimenez APRN CRNA       Performed By: CRNA  Consent for Airway        Urgency: elective  Indications and Patient Condition       Indications for airway management: josé luis-procedural         Mask difficulty assessment: 0 - not attempted    Final Airway Details       Final airway type: supraglottic airway    Supraglottic Airway Details        Type: LMA       Brand: I-Gel       LMA size: 5    Post intubation assessment        Placement verified by: capnometry, equal breath sounds and chest rise        Number of attempts at approach: 1       Secured with: pink tape       Ease of procedure: easy       Dentition: Intact and Unchanged

## 2024-01-03 NOTE — PROGRESS NOTES
[facility]    Medicine Progress Note - Hospitalist Service    Date of Admission:  1/1/2024    Assessment & Plan     Blanco Osborne is a 58 year old male , with a PMH ESRD on HD w L UE fistula and recently removed Jungular Tunneled cath, liver cirrhosis s/p transplant, portal hypertension, SBP, PE, A-fib, on DOAC, seizures, CHRISTIAN not on CPAP, who was admitted on 1/1/2024., for urgent hemodialysis and need for fistula reevaluation vs new central access need.     Discussed with ED MD-presented to ED for evaluation of dialysis fistula with pain.  Last HD was 5 days before admission.  Has had 2 ED visits since w request for hemodialysis.  Deferred till today.  Presented to outpatient hemodialysis today, they did not feel comfortable accessing, and so sent to the ED.  Admitted for evaluation of fistulaand need for repeat placement of Tunneled cath        ESRD on HD, with missed HD  Fistual complication  Hx left AV fistula pseudoaneurysm repair, 8/2023, 12/21/23  Stenosis of the AVF  Hematoma Lt arm around AVF with with pseudoaneurysm   HTN w hx chronic hypotension, on midodrine    ESRD . Here w fistula access concern and missed HD,  At this time using fistula which has had multiple past access issues noted in mid LUE AV fistula stenosis per Dr. Mendoza.  follows w Dr Bradshaw for nephrology through the  of .He dialyzes at [facility] . pt last completed HD was 12/27/23, unsuccessful HD attempt 12/30 was aborted due to hypotension, [facility], and later same day also with [facility] ED, renal aware, urgent dialysis deferred. presented 1/1/2024 outpatient HD but fistula access declined by hemodialysis site and sent to ED.  Patient endorsing MALDONADO x 2-3 days with volume overload sx.  Mild weakness, and  (baseline 39), creatinine 11.96.     -Nephrology consulted and following, was able to access aVF and dialyze 1/1 and 1/2.   -Vascular surgery consulted and evaluated patient,  arterial Doppler and non vasc US lt UE completed, shows patent AV fistula with moderate stenosis.  It also showed hematoma 20 x 6 x 4 with pseudoaneurysm.   -Tunneled catheter has been placed 1/3, plan is OR per vascular surgery today.  -PTA midodrine, renal vitamin, sevelamer resumed  -Pain control, p.o. and IV meds      Hx Liver transplant 2016 with recurrent cirrhosis with evidence of portal hypertension and ascites   Hx Spontaneous Bacterial Peritonitis  11/1/23  Per last GI note, s/p 2016 liver transplantation for metabolic dysfunction associated steatotic liver disease . Saw GI last month. LFTs ow likely mild cirrhosis .  Patient without any abdominal distention, denies any fevers chills or infectious symptoms.   No   -Resumed PTA prophylactic ciprofloxacin 250mg daily (renally dosed)   -Hepatology follow up outpatient as scheduled with Dr. Simms     Chronic anemia  Leukopenia  Acute on chronic thrombocytopenia  Baseline hemoglobin 7, presented with hemoglobin of 8.  Also baseline hemoglobin 110-120s, presented with 63.   -Hemoglobin is stable at 8  -Platelet count stable at 70s  -Peripheral smear shows normocytic normochromic anemia and moderate thrombocytopenia but no abnormal platelet clumping.  No schistocytes or features of hemolysis.  No circulating blast. B12 and folate normal.  Suspect due to bleeding and platelet consumption.   -Transfuse for Hb less than 7 or platelet less than 20.  -Anticoagulation on hold currently.    Hx DVT, PE, dx 10/5/2023   Atrial fibrillation on Eliquis  Hx AF w wide complex tachycardia 10/2023 admit, w failed cardioversion x 2.  Heart rate sinus on arrival.  No RVR signs.  - PTA eliquis now on hold given hematoma and planned OR  - No PTA beta-blocker secondary to hypotension.     Hx completed embolic stroke, 11/2022    - PTA DOAC (no ASA). PTA statin      Seizure disorder  - continue Vimpat     Hx Recurrent pericardial effusion   Past hospitalizations reviewed,12/18 last  chest x-ray reviewed.    Generalized anxiety disorder  -PTA gabapentin 300 at bedtime  -PTA as needed hydroxyzine     Restless leg syndrome  -PTA ropinirole only used occasionally, not helpful  -PTA as needed gabapentin helps     GERD  -Continue PPI     Hx Moderate protein-energy malnutrition     Consider repeat Dietary consult     Probable CHRISTIAN-  NOT on CPAP  --Per patient's wife, he had a sleep study about 10 years ago but does not have any CPAP or BiPAP at home.  Patient confirms he does not have CPAP and has not yet been able to schedule the sleep study.  Thus untreated.  -Monitor for nocturnal hypoxia.  -needs to schedule sleep study as outpatient after discharge    History of prolonged hospitalization [facility] for CAP (*see 10/5/23) d/c summary, had prolonged TCU stay and now able to walk gain.           Diet: Snacks/Supplements Adult: Gelatein Plus; Between Meals  Snacks/Supplements Adult: Ensure Clear; With Meals  Room Service  NPO for Medical/Clinical Reasons Except for: Meds    DVT Prophylaxis: DOAC  Nixon Catheter: Not present  Lines: None     Cardiac Monitoring: None  Code Status: Full Code      Clinically Significant Risk Factors                # Thrombocytopenia: Lowest platelets = 76 in last 2 days, will monitor for bleeding   # Hypertension: Noted on problem list        # Overweight: Estimated body mass index is 25.86 kg/m  as calculated from the following:    Height as of this encounter: 1.829 m (6').    Weight as of this encounter: 86.5 kg (190 lb 11.2 oz)., PRESENT ON ADMISSION       # Financial/Environmental Concerns: none         Disposition Plan      Expected Discharge Date: 01/04/2024      Destination: home with family              Светлана Noble MD  Hospitalist Service  [facility]  Securely message with Matthieu (more info)  Text page via [facility] Paging/Directory   ______________________________________________________________________    Interval History     Discussed with  nursing staff and chart reviewed this morning.  Ongoing left arm pain, using IV Dilaudid for pain.  Denies tingling or numbness distally in the forearm/hand.  Hemoglobin stable, platelet count stable at 70s.     Physical Exam   Vital Signs: Temp: 98.4  F (36.9  C) Temp src: Oral BP: 108/67 Pulse: 65   Resp: 16 SpO2: 99 % O2 Device: None (Room air)    Weight: 190 lbs 11.2 oz    General: AAOx3, appears comfortable.  HEENT: PERRLA EOMI. Mucosa moist.   Lungs: Bilateral equal air entry. Clear to auscultation, normal work of breathing.   CVS: S1S2 regular, no tachycardia or murmur.   Abdomen: Soft, NT, ND. BS heard.  MSK: Lt arm with AVF with bruit. It is swollen and tender, unchanged. No erythema although faint bruise/hyperpigmentation noted.  Arm has been wrapped now.  Neuro: AAOX3. CN 2-12 normal. Strength symmetrical.  Skin: No rash.   Patient also has small hematoma at the Rt chest wall, prior catheter site.      Medical Decision Making       45 MINUTES SPENT BY ME on the date of service doing chart review, history, exam, documentation & further activities per the note.         Data     I have personally reviewed the following data over the past 24 hrs:    3.8 (L)  \   8.6 (L)   / 76 (L)     134 (L) 91 (L) 54.6 (H) /  88   4.4 32 (H) 6.34 (H) \     ALT: 12 AST: 22 AP: 179 (H) TBILI: 0.8   ALB: 4.2 TOT PROTEIN: 7.5 LIPASE: N/A     Ferritin:  N/A % Retic:  1.2 LDH:  N/A       Imaging results reviewed over the past 24 hrs:   Recent Results (from the past 24 hour(s))   US Ext Arterial Venous Dialys Acs Graft    Narrative    [facility]  01/02/2024    EXAM: ULTRASOUND VENOUS HEMODIALYSIS STUDY LEFT UPPER EXTREMITY DATED    INDICATION:  End stage renal disease.    TECHNIQUE: Ultrasound examination of the upper extremity arteries and veins  was performed, including gray-scale, color, and Doppler waveform analysis.    LEFT UPPER EXTREMITY FINDINGS :    There is a hematoma along the left humerus  measuring 20 x 6 x 4 cm likely representing a hematoma. There is some internal flow within the hematoma consistent with a 5.2 x 2.5 x 2.3 cm pseudoaneurysm likely from previous needle access site during   dialysis. Otherwise the left left radial to cephalic AV fistula is patent.      Impression    IMPRESSION:  1. There is a 20 x 6 x 4 cm hematoma within the left distal arm at the level of the distal humerus. There is a pseudoaneurysm measuring 5.2 x 2.5 x 2.3 cm within the hematoma right above the antecubital fossa likely from previous needle access site from   dialysis.

## 2024-01-03 NOTE — IR NOTE
Interventional Radiology Intra-procedural Nursing Note    Patient Name: Blanco Osborne  Medical Record Number: [id number]  Today's Date: January 3, 2024    Procedure consented for : Tunnel CVC  Start time: 1142  End time: 1158  Report provided to: st Dockery RN  Patient depart time and location: 5159 to 1645    Note: Patient entered Interventional Radiology Suite number 2 via cart. Patient awake, alert and oriented. Assisted onto procedural table in supine position. Prepped and draped.  Dr. Plata in room. Time out and procedure started. Vital signs stable. .    Procedure well tolerated by patient without complications. Procedure end with debrief by Dr. Plata.  Pressure held until hemostasis achieved. Chlorhexadine and tegaderm dressing applied to Right chest wall.        Administered medication totals:  Lidocaine 1% 20 mL Intradermal  Heparin 4000 Units CVC  Fentanyl 50 mcg IVP

## 2024-01-03 NOTE — PLAN OF CARE
Summary:  Hemodiaylsis, infiltrated fistula   DATE & TIME: 01/02 Evenings   Cognitive Concerns/ Orientation : A&Ox4. Calm, cooperative, and pleasant.   BEHAVIOR & AGGRESSION TOOL COLOR: Green   CIWA SCORE: NA   ABNL VS/O2: VSS with BP in the 100's. On RA   MOBILITY: Independent  PAIN MANAGMENT: c/o left arm pain d/t infiltrated fistula. Dilaudid IV q2 PRN given x3. Atarax x1 for itching   DIET: Renal. Good appetite   BOWEL/BLADDER: Continent of bowel- hemodialysis- producing very minimal urine. Pt reports BM this AM   ABNL LAB/BG: CR- 11.96, Hgb 8.6. Awaiting AM labs after rescheduled.   DRAIN/DEVICES: NEW PIV in RFA SL   TELEMETRY RHYTHM: NA  SKIN: L arm +2 edema- sensation intact. Dialysis port was removed on right chest few weeks ago, small hematoma present. Dressing CDI. Dialysis M,W,F.  TESTS/PROCEDURES: Duplex US completed, dialysis scheduled for Weds.   D/C DAY/GOALS/PLACE: Pending   OTHER IMPORTANT INFO: Plan for dialysis tomorrow to get back on regular MWF schedule. Nephrology and Vascular following.

## 2024-01-03 NOTE — PROGRESS NOTES
VASCULAR SURGERY    Blanco Osborne is well known to me.  Left AVF with several revisions.    Seen in Clinic last week.  AVF working well with mild short stenosis at mid upper arm--good diameter of AVF proximally and distally.    We removed right TC then.    Significant hematoma to left upper arm.  Duplex with PSA closer to elbow and known mid stenosis.    Will need surgical repair of PSA and evacuation of hematoma.  Will also repair stenotic area with patch angioplasty since will need new TC and unable to use AVF for about 4 weeks.    Patient being evaluated for LRRT at [facility]--still will need AVF working well.    Dialysis via AVF was successful yesterday.      Deejay Mcneil MD

## 2024-01-03 NOTE — PRE-PROCEDURE
GENERAL PRE-PROCEDURE:   Procedure:  Tunneled dialysis catheter placement  Date/Time:  1/3/2024 9:02 AM    Written consent obtained?: Yes    Risks and benefits: Risks, benefits and alternatives were discussed    Consent given by:  Patient  Patient states understanding of procedure being performed: Yes    Patient's understanding of procedure matches consent: Yes    Procedure consent matches procedure scheduled: Yes    Expected level of sedation:  Moderate  Appropriately NPO:  Yes  ASA Class:  2  Mallampati  :  Grade 2- soft palate, base of uvula, tonsillar pillars, and portion of posterior pharyngeal wall visible  Lungs:  Lungs clear with good breath sounds bilaterally  Heart:  Normal heart sounds and rate  History & Physical reviewed:  History and physical reviewed and no updates needed  Statement of review:  I have reviewed the lab findings, diagnostic data, medications, and the plan for sedation    Left upper extremity AVF w PSA  Small hematoma Rt chest s/p tunneled HD catheter removal    Russell Horn PA-C  Interventional Radiology  [phone] (IR)  [phone] ([facility] Office)

## 2024-01-03 NOTE — PROGRESS NOTES
Nephrology Progress Note  01/03/2024         Assessment & Recommendations:    58 y.o man with ESRD, admitted for AVF dysfunction.      # ESRD:              -SLP under care of Dr. Bradshaw              -[facility]  # L AVF: infiltrated on Saturday and unable to use at outpatient unit.  Accessed  here on 1/1 and 1/2 with 17-gauge needles.  Ultrasound shows hematoma and pseudoaneurysm.  Plan for surgery and tunneled dialysis catheter placement.    # Anemia: Mircera and Venover  # Intra-dialytic hypotension: + midodrine    Skip dialysis today.  Discussed with patient.  He wants to skip tomorrow and do his next dialysis on Friday.  He already had 2 sessions this week.  It should be okay.      Maria Fink MD  [facility] - Nephrology   [phone]      Interval History :   Seen / examined.   Reviewed ultrasound findings   Showing hematoma and pseudoaneurysm.  Noted plans for surgery today or tomorrow.  Noted plans for tunneled dialysis catheter placement.  Otherwise reports doing well.  Denies any other complaints.    Physical Exam:   I/O last 3 completed shifts:  In: -   Out: 2400 [Other:2400]    /68 (BP Location: Right arm)   Pulse 64   Temp 98.1  F (36.7  C) (Oral)   Resp 16   Ht 1.829 m (6')   Wt 86.5 kg (190 lb 11.2 oz)   SpO2 98%   BMI 25.86 kg/m      GENERAL APPEARANCE: alert and no distress  EYES:  no scleral icterus, pupils equal  PULM: lungs clear to auscultation,   CV: regular rhythm, normal rate, no rub    GI: soft, non tender,   NEURO: mentation intact and speech normal, no asterixis   Access -left upper extremity AV fistula.  Left arm is swollen.      Labs:   All labs reviewed by me  Electrolytes/Renal -   Recent Labs   Lab Test 01/03/24  0733 01/02/24  2327 01/01/24  0813 12/18/23  1436 11/01/23  0634 10/24/23  0537 10/23/23  0654 10/06/23  1530 10/06/23  1040 10/05/23  0251 10/05/23  0245 04/25/23  1113 04/24/23  0815 04/19/23  1433 04/18/23  0928   * 138 135   < > 129*   < > 121*   < >   --    < > 136   < > 138   < > 136   POTASSIUM 4.4 4.5 4.7   < > 4.4   < > 5.5*   < >  --    < > 3.8   < > 4.9   < > 4.2   CHLORIDE 91* 93* 91*   < > 93*   < > 87*   < >  --    < > 90*   < > 98   < > 97*   CO2 32* 31* 24   < > 21*   < > 19*   < >  --    < > 24   < > 25   < > 25   BUN 54.6* 47.0* 124.5*   < > 67.1*   < > 87.9*   < >  --    < > 44.5*   < > 93.8*   < > 69.9*   CR 6.34* 5.73* 11.96*   < > 7.05*   < > 8.03*   < >  --    < > 5.77*   < > 7.83*   < > 5.62*   GLC 88 93 90   < > 94   < > 107*   < >  --    < > 127*   < > 97   < > 124*   KELLY 9.7 9.3 9.1   < > 8.9   < > 8.8   < >  --    < > 9.4   < > 10.4*   < > 10.3*   MAG  --   --   --   --   --   --   --   --   --   --  2.1  --  2.4*  --  2.0   PHOS  --   --   --   --  4.0  --  3.1  --  6.6*   < > 5.2*   < > 8.0*   < > 5.5*    < > = values in this interval not displayed.       CBC -   Recent Labs   Lab Test 01/03/24 0733 01/02/24 2327 01/01/24 0813   WBC 3.8* 4.0 3.5*   HGB 8.6* 8.7* 8.6*   PLT 76* 78* 63*       LFTs -   Recent Labs   Lab Test 01/02/24 2327 01/01/24  0813 12/18/23  1436   ALKPHOS 179* 178* 210*   BILITOTAL 0.8 0.4 0.6   ALT 12 10 13   AST 22 17 24   PROTTOTAL 7.5 7.4 8.3   ALBUMIN 4.2 4.0 4.4       Iron Panel -   Recent Labs   Lab Test 10/24/23  0537 04/14/23  0725 04/06/23  0808 03/25/23  1058 03/12/23  0756   IRON 38* 60* 78  --  37*   IRONSAT 20 29  --   --  26   HOSSEIN 423* 286  --    < > 252    < > = values in this interval not displayed.           Current Medications:   - MEDICATION INSTRUCTIONS for Dialysis Patients -   Does not apply See Admin Instructions    [Held by provider] apixaban ANTICOAGULANT  5 mg Oral BID    artificial saliva  1 spray Mouth/Throat 4x Daily    ciprofloxacin  250 mg Oral Daily    gabapentin  300 mg Oral At Bedtime    lacosamide  100 mg Oral QPM    lacosamide  50 mg Oral QAM    midodrine  10 mg Oral TID w/meals    multivitamin RENAL  1 capsule Oral Daily    sevelamer carbonate  800 mg Oral TID w/meals     sodium chloride (PF)  3 mL Intracatheter Q8H    tacrolimus  0.5 mg Oral BID IS      - MEDICATION INSTRUCTIONS -       Maria Fink MD

## 2024-01-03 NOTE — PLAN OF CARE
Summary:  Hemodiaylsis, infiltrated fistula   DATE & TIME: 01/02-01/03/24 Night shift  Cognitive Concerns/ Orientation : A&Ox4. Calm, cooperative, and pleasant.   BEHAVIOR & AGGRESSION TOOL COLOR: Green   CIWA SCORE: NA   ABNL VS/O2: VSS on RA   MOBILITY: Independent  PAIN MANAGMENT: c/o left arm pain due to infiltrated fistula. Dilaudid IV q2 PRN given x1 with relief   DIET: Renal  BOWEL/BLADDER: Continent of bowel- no BM this shift; hemodialysis- producing very minimal urine.   ABNL LAB/BG: NA  DRAIN/DEVICES: PIV in RFA SL   TELEMETRY RHYTHM: NA  SKIN: L arm +2 edema- sensation intact- to elevate on pillows and ace wrap per Vascular; Dialysis port was removed on right chest few weeks ago, small hematoma present. Dressing CDI. Dialysis M,W,F.  TESTS/PROCEDURES: Duplex US completed, dialysis scheduled for Weds.   D/C DAY/GOALS/PLACE:   OTHER IMPORTANT INFO: Plan for dialysis today to get back on regular MWF schedule. Nephrology and Vascular following.

## 2024-01-03 NOTE — PLAN OF CARE
Goal Outcome Evaluation:    Summary: Hemodiaylsis, infiltrated fistula     DATE & TIME: 01/03/24 [id number]  Cognitive Concerns/ Orientation: A&Ox4. Calm, cooperative, and pleasant.   BEHAVIOR & AGGRESSION TOOL COLOR: Green   CIWA SCORE: NA   ABNL VS/O2: VSS on RA   MOBILITY: Independent  PAIN MANAGMENT: C/O left arm pain due to infiltrated fistula. Dilaudid IV q2 PRN given x2. PRN Atarax given x1 for itching  DIET: NPO for procedure  BOWEL/BLADDER: Continent of bowel- no BM this shift; hemodialysis- producing very minimal urine.   ABNL LAB/BG: NA  DRAIN/DEVICES: R PIV and RFA SL. CVC tunneled cath in R. jugular  TELEMETRY RHYTHM: NA  SKIN: L arm +2 edema- sensation intact- to elevate on pillows and ace wrap per Vascular; Dialysis port was removed on right chest few weeks ago, small hematoma present. Dressing CDI. Dialysis M,W,F.  TESTS/PROCEDURES: New tunneled dialysis catheter placed 1/3. Revision of AV fistula and hematoma evaluation to be completed 1500 1/3.  D/C DAY/GOALS/PLACE: home pending improvement  OTHER IMPORTANT INFO: Plan for dialysis today to get back on regular MWF schedule. Nephrology and Vascular following.

## 2024-01-04 ENCOUNTER — TELEPHONE (OUTPATIENT)
Dept: FAMILY MEDICINE | Facility: CLINIC | Age: 59
End: 2024-01-04
Payer: COMMERCIAL

## 2024-01-04 ENCOUNTER — TELEPHONE (OUTPATIENT)
Dept: TRANSPLANT | Facility: CLINIC | Age: 59
End: 2024-01-04
Payer: COMMERCIAL

## 2024-01-04 LAB
BLD PROD TYP BPU: NORMAL
BLOOD COMPONENT TYPE: NORMAL
CODING SYSTEM: NORMAL
CROSSMATCH: NORMAL
HBV SURFACE AB SERPL IA-ACNC: <3.5 M[IU]/ML
HBV SURFACE AB SERPL IA-ACNC: NONREACTIVE M[IU]/ML
HBV SURFACE AG SERPL QL IA: NONREACTIVE
HCT VFR BLD AUTO: 20.3 % (ref 40–53)
HCT VFR BLD AUTO: 21.2 % (ref 40–53)
HGB BLD-MCNC: 6.7 G/DL (ref 13.3–17.7)
HGB BLD-MCNC: 7 G/DL (ref 13.3–17.7)
ISSUE DATE AND TIME: NORMAL
UNIT ABO/RH: NORMAL
UNIT NUMBER: NORMAL
UNIT STATUS: NORMAL
UNIT TYPE ISBT: 5100

## 2024-01-04 PROCEDURE — 86706 HEP B SURFACE ANTIBODY: CPT | Performed by: INTERNAL MEDICINE

## 2024-01-04 PROCEDURE — P9016 RBC LEUKOCYTES REDUCED: HCPCS | Performed by: HOSPITALIST

## 2024-01-04 PROCEDURE — 250N000013 HC RX MED GY IP 250 OP 250 PS 637: Performed by: STUDENT IN AN ORGANIZED HEALTH CARE EDUCATION/TRAINING PROGRAM

## 2024-01-04 PROCEDURE — 120N000001 HC R&B MED SURG/OB

## 2024-01-04 PROCEDURE — 99232 SBSQ HOSP IP/OBS MODERATE 35: CPT | Performed by: INTERNAL MEDICINE

## 2024-01-04 PROCEDURE — 36415 COLL VENOUS BLD VENIPUNCTURE: CPT | Performed by: HOSPITALIST

## 2024-01-04 PROCEDURE — 99232 SBSQ HOSP IP/OBS MODERATE 35: CPT | Performed by: HOSPITALIST

## 2024-01-04 PROCEDURE — 85014 HEMATOCRIT: CPT | Performed by: HOSPITALIST

## 2024-01-04 PROCEDURE — 250N000011 HC RX IP 250 OP 636

## 2024-01-04 PROCEDURE — 250N000013 HC RX MED GY IP 250 OP 250 PS 637: Performed by: PHYSICIAN ASSISTANT

## 2024-01-04 PROCEDURE — 36415 COLL VENOUS BLD VENIPUNCTURE: CPT | Performed by: INTERNAL MEDICINE

## 2024-01-04 PROCEDURE — 250N000012 HC RX MED GY IP 250 OP 636 PS 637: Performed by: STUDENT IN AN ORGANIZED HEALTH CARE EDUCATION/TRAINING PROGRAM

## 2024-01-04 RX ORDER — FERROUS SULFATE 325(65) MG
325 TABLET ORAL DAILY
Status: DISCONTINUED | OUTPATIENT
Start: 2024-01-04 | End: 2024-01-06 | Stop reason: HOSPADM

## 2024-01-04 RX ORDER — FERROUS SULFATE 325(65) MG
325 TABLET ORAL DAILY
Qty: 180 TABLET | Refills: 0 | Status: SHIPPED | OUTPATIENT
Start: 2024-01-04 | End: 2024-03-25

## 2024-01-04 RX ORDER — OXYCODONE HYDROCHLORIDE 5 MG/1
5 TABLET ORAL EVERY 6 HOURS PRN
Qty: 12 TABLET | Refills: 0 | Status: SHIPPED | OUTPATIENT
Start: 2024-01-04 | End: 2024-01-07

## 2024-01-04 RX ADMIN — Medication 1 SPRAY: at 08:09

## 2024-01-04 RX ADMIN — OXYCODONE HYDROCHLORIDE 5 MG: 5 TABLET ORAL at 06:34

## 2024-01-04 RX ADMIN — OXYCODONE HYDROCHLORIDE 5 MG: 5 TABLET ORAL at 00:34

## 2024-01-04 RX ADMIN — SEVELAMER CARBONATE 800 MG: 800 TABLET, FILM COATED ORAL at 18:05

## 2024-01-04 RX ADMIN — MIDODRINE HYDROCHLORIDE 10 MG: 5 TABLET ORAL at 18:05

## 2024-01-04 RX ADMIN — OXYCODONE HYDROCHLORIDE 5 MG: 5 TABLET ORAL at 22:31

## 2024-01-04 RX ADMIN — OXYCODONE HYDROCHLORIDE 5 MG: 5 TABLET ORAL at 18:46

## 2024-01-04 RX ADMIN — Medication 1 CAPSULE: at 08:02

## 2024-01-04 RX ADMIN — MIDODRINE HYDROCHLORIDE 10 MG: 5 TABLET ORAL at 08:02

## 2024-01-04 RX ADMIN — Medication 1 SPRAY: at 12:00

## 2024-01-04 RX ADMIN — FERROUS SULFATE TAB 325 MG (65 MG ELEMENTAL FE) 325 MG: 325 (65 FE) TAB at 14:39

## 2024-01-04 RX ADMIN — TACROLIMUS 0.5 MG: 0.5 CAPSULE ORAL at 18:05

## 2024-01-04 RX ADMIN — SEVELAMER CARBONATE 800 MG: 800 TABLET, FILM COATED ORAL at 11:12

## 2024-01-04 RX ADMIN — TACROLIMUS 0.5 MG: 0.5 CAPSULE ORAL at 07:58

## 2024-01-04 RX ADMIN — LACOSAMIDE 50 MG: 50 TABLET, FILM COATED ORAL at 07:58

## 2024-01-04 RX ADMIN — OXYCODONE HYDROCHLORIDE 5 MG: 5 TABLET ORAL at 14:39

## 2024-01-04 RX ADMIN — SEVELAMER CARBONATE 800 MG: 800 TABLET, FILM COATED ORAL at 08:02

## 2024-01-04 RX ADMIN — CEFAZOLIN SODIUM 2 G: 2 INJECTION, SOLUTION INTRAVENOUS at 11:11

## 2024-01-04 RX ADMIN — CIPROFLOXACIN HYDROCHLORIDE 250 MG: 250 TABLET, FILM COATED ORAL at 16:00

## 2024-01-04 RX ADMIN — OXYCODONE HYDROCHLORIDE 5 MG: 5 TABLET ORAL at 10:38

## 2024-01-04 RX ADMIN — LACOSAMIDE 100 MG: 50 TABLET, FILM COATED ORAL at 19:41

## 2024-01-04 RX ADMIN — MIDODRINE HYDROCHLORIDE 10 MG: 5 TABLET ORAL at 11:11

## 2024-01-04 RX ADMIN — ACETAMINOPHEN 650 MG: 325 TABLET, FILM COATED ORAL at 22:31

## 2024-01-04 ASSESSMENT — ACTIVITIES OF DAILY LIVING (ADL)
ADLS_ACUITY_SCORE: 30
ADLS_ACUITY_SCORE: 29
ADLS_ACUITY_SCORE: 30
ADLS_ACUITY_SCORE: 29

## 2024-01-04 NOTE — PROGRESS NOTES
Nephrology Progress Note  01/04/2024         Assessment & Recommendations:    58 y.o man with ESRD, admitted for AVF dysfunction.      # ESRD:              -SLP under care of Dr. Bradshaw              -F  # L AVF: Presented post infiltration and found to have large hematoma and pseudoaneurysm, status postrepair on 1/3.  Now has a tunneled catheter in place.    # Anemia: Mircera and Venofer as outpt.   Status post evacuation of hematoma.  Recheck pending.  # Intra-dialytic hypotension: + midodrine    Dialysis tomorrow.  Midodrine as needed.      Maria Fink MD  [facility] - Nephrology   [phone]      Interval History :   Seen / examined.   Status postrepair of pseudoaneurysm and evacuation of large hematoma yesterday.  Hemoglobin recheck pending.  Otherwise no acute issues.  Blood pressure okay    Physical Exam:   I/O last 3 completed shifts:  In: 940 [P.O.:240; I.V.:700]  Out: 265 [Drains:15; Blood:250]    /61 (BP Location: Right arm)   Pulse 71   Temp 98.6  F (37  C) (Oral)   Resp 16   Ht 1.829 m (6')   Wt 86.5 kg (190 lb 11.2 oz)   SpO2 97%   BMI 25.86 kg/m      GENERAL APPEARANCE: alert and no distress  EYES:  no scleral icterus, pupils equal  PULM: lungs clear to auscultation,   CV: regular rhythm, normal rate, no rub    GI: soft, non tender,   NEURO: mentation intact and speech normal, no asterixis   Access -left upper extremity AV fistula.  Surgical draping in place with drain    Labs:   All labs reviewed by me  Electrolytes/Renal -   Recent Labs   Lab Test 01/03/24  0733 01/02/24  2327 01/01/24  0813 12/18/23  1436 11/01/23  0634 10/24/23  0537 10/23/23  0654 10/06/23  1530 10/06/23  1040 10/05/23  0251 10/05/23  0245 04/25/23  1113 04/24/23  0815 04/19/23  1433 04/18/23  0928   * 138 135   < > 129*   < > 121*   < >  --    < > 136   < > 138   < > 136   POTASSIUM 4.4 4.5 4.7   < > 4.4   < > 5.5*   < >  --    < > 3.8   < > 4.9   < > 4.2   CHLORIDE 91* 93* 91*   < > 93*   < > 87*    < >  --    < > 90*   < > 98   < > 97*   CO2 32* 31* 24   < > 21*   < > 19*   < >  --    < > 24   < > 25   < > 25   BUN 54.6* 47.0* 124.5*   < > 67.1*   < > 87.9*   < >  --    < > 44.5*   < > 93.8*   < > 69.9*   CR 6.34* 5.73* 11.96*   < > 7.05*   < > 8.03*   < >  --    < > 5.77*   < > 7.83*   < > 5.62*   GLC 88 93 90   < > 94   < > 107*   < >  --    < > 127*   < > 97   < > 124*   KELLY 9.7 9.3 9.1   < > 8.9   < > 8.8   < >  --    < > 9.4   < > 10.4*   < > 10.3*   MAG  --   --   --   --   --   --   --   --   --   --  2.1  --  2.4*  --  2.0   PHOS  --   --   --   --  4.0  --  3.1  --  6.6*   < > 5.2*   < > 8.0*   < > 5.5*    < > = values in this interval not displayed.       CBC -   Recent Labs   Lab Test 01/03/24 0733 01/02/24 2327 01/01/24 0813   WBC 3.8* 4.0 3.5*   HGB 8.6* 8.7* 8.6*   PLT 76* 78* 63*       LFTs -   Recent Labs   Lab Test 01/02/24 2327 01/01/24  0813 12/18/23  1436   ALKPHOS 179* 178* 210*   BILITOTAL 0.8 0.4 0.6   ALT 12 10 13   AST 22 17 24   PROTTOTAL 7.5 7.4 8.3   ALBUMIN 4.2 4.0 4.4       Iron Panel -   Recent Labs   Lab Test 10/24/23  0537 04/14/23  0725 04/06/23  0808 03/25/23  1058 03/12/23  0756   IRON 38* 60* 78  --  37*   IRONSAT 20 29  --   --  26   HOSSEIN 423* 286  --    < > 252    < > = values in this interval not displayed.           Current Medications:   - MEDICATION INSTRUCTIONS for Dialysis Patients -   Does not apply See Admin Instructions    [Held by provider] apixaban ANTICOAGULANT  5 mg Oral BID    artificial saliva  1 spray Mouth/Throat 4x Daily    ciprofloxacin  250 mg Oral Daily    gabapentin  300 mg Oral At Bedtime    lacosamide  100 mg Oral QPM    lacosamide  50 mg Oral QAM    midodrine  10 mg Oral TID w/meals    multivitamin RENAL  1 capsule Oral Daily    sevelamer carbonate  800 mg Oral TID w/meals    sodium chloride (PF)  3 mL Intracatheter Q8H    tacrolimus  0.5 mg Oral BID IS      - MEDICATION INSTRUCTIONS -      sodium chloride 10 mL/hr at 01/03/24 1457     Maria  MD Danae

## 2024-01-04 NOTE — PROGRESS NOTES
[facility]    Medicine Progress Note - Hospitalist Service    Date of Admission:  1/1/2024    Assessment & Plan     Blanco Osborne is a 58 year old male , with a PMH ESRD on HD w L UE fistula and recently removed Jungular Tunneled cath, liver cirrhosis s/p transplant, portal hypertension, SBP, PE, A-fib, on DOAC, seizures, CHRISTIAN not on CPAP, who was admitted on 1/1/2024., for urgent hemodialysis and need for fistula reevaluation vs new central access need.     Discussed with ED MD-presented to ED for evaluation of dialysis fistula with pain.  Last HD was 5 days before admission.  Has had 2 ED visits since w request for hemodialysis.  Deferred till today.  Presented to outpatient hemodialysis today, they did not feel comfortable accessing, and so sent to the ED.  Admitted for evaluation of fistulaand need for repeat placement of Tunneled cath        ESRD on HD, with missed HD  Fistual complication  Hx left AV fistula pseudoaneurysm repair, 8/2023, 12/21/23, 1/4/24  Stenosis of the AVF  Hematoma Lt arm around AVF with with pseudoaneurysm  Stenosis of the fistula   HTN w hx chronic hypotension, on midodrine    ESRD . Here w fistula access concern and missed HD,  At this time using fistula which has had multiple past access issues noted in mid LUE AV fistula stenosis per Dr. Mendoza.  follows w Dr Bradshaw for nephrology through the [facility].He dialyzes at [facility] . pt last completed HD was 12/27/23, unsuccessful HD attempt 12/30 was aborted due to hypotension, evaluated at  [facility] ED, and later same day also with [facility] ED, renal aware, urgent dialysis deferred. presented 1/1/2024 outpatient HD but fistula access declined by hemodialysis site and sent to ED.  Patient endorsing MALDONADO x 2-3 days with volume overload sx.  Mild weakness, and  (baseline 39), creatinine 11.96.     -Nephrology consulted and following, was able to access aVF and dialyze 1/1 and 1/2.  -Vascular surgery  consulted, arterial doppler and non vasc US lt UE completed, shows patent AV fistula with moderate stenosis.  It also showed hematoma 20 x 6 x 4 with pseudoaneurysm. S/p pseudoaneurym repair and cephalic vein patch angioplasty 1/3  -Tunneled catheter placed 1/3. Now HD via catheter, next dialysis per his MWF schedule 1/5  -PTA midodrine, renal vitamin, sevelamer resumed  -Pain control, p.o. and IV meds      Hx Liver transplant 2016 with recurrent cirrhosis with evidence of portal hypertension and ascites   Hx Spontaneous Bacterial Peritonitis  11/1/23  Per last GI note, s/p 2016 liver transplantation for metabolic dysfunction associated steatotic liver disease . Saw GI last month. LFTs ow likely mild cirrhosis .  Patient without any abdominal distention, denies any fevers chills or infectious symptoms.   No   -Resumed PTA prophylactic ciprofloxacin 250mg daily (renally dosed)   -Hepatology follow up outpatient as scheduled with Dr. Simms     Acute blood loss on Chronic anemia  Leukopenia  Acute on chronic thrombocytopenia  Baseline hemoglobin 7, presented with hemoglobin of 8.  Also baseline hemoglobin 110-120s, presented with 63.   -Hemoglobin initially stable at 8, then after evacuation of hematoma, down to 7.   -Platelet count stable at 70s  -Peripheral smear shows normocytic normochromic anemia and moderate thrombocytopenia but no abnormal platelet clumping.  No schistocytes or features of hemolysis.  No circulating blast. B12 and folate normal.  Suspect due to bleeding and platelet consumption.   -Transfuse for Hb less than 7 or platelet less than 20. Discussed with patient at length and he is very reluctant for transfusion given transplant. Follow H&H q6.   -Anticoagulation on hold currently.    Hx DVT, PE, dx 10/5/2023   Atrial fibrillation on Eliquis  Hx AF w wide complex tachycardia 10/2023 admit, w failed cardioversion x 2.  Heart rate sinus on arrival.  No RVR signs.  - PTA eliquis now on hold given  hematoma and drop In Hb  -plan to resume in next 1-2 days when Hb stable and no more bleeding.   - No PTA beta-blocker secondary to hypotension.     Hx completed embolic stroke, 11/2022    - PTA is on DOAC, as above,   -PTA statin resumed.       Seizure disorder  - continue Vimpat     Hx Recurrent pericardial effusion   Past hospitalizations reviewed,12/18 last chest x-ray reviewed.    Generalized anxiety disorder  -PTA gabapentin 300 at bedtime  -PTA as needed hydroxyzine     Restless leg syndrome  -PTA ropinirole only used occasionally, not helpful  -PTA as needed gabapentin helps     GERD  -Continue PPI     Hx Moderate protein-energy malnutrition     Consider repeat Dietary consult     Probable CHRISTIAN-  NOT on CPAP  --Per patient's wife, he had a sleep study about 10 years ago but does not have any CPAP or BiPAP at home.  Patient confirms he does not have CPAP and has not yet been able to schedule the sleep study.  Thus untreated.  -Monitor for nocturnal hypoxia.  -needs to schedule sleep study as outpatient after discharge    History of prolonged hospitalization [facility] for CAP (*see 10/5/23) d/c summary, had prolonged TCU stay and now able to walk gain.           Diet: Snacks/Supplements Adult: Gelatein Plus; Between Meals  Snacks/Supplements Adult: Ensure Clear; With Meals  Room Service  Renal Diet (dialysis)    DVT Prophylaxis: DOAC  Nixon Catheter: Not present  Lines: PRESENT      CVC Double Lumen Right External jugular Tunneled-Site Assessment: WDL  Hemodialysis Vascular Access Arteriovenous fistula Left Forearm-Site Assessment: Unable to visulaize    Cardiac Monitoring: None  Code Status: Full Code      Clinically Significant Risk Factors                # Thrombocytopenia: Lowest platelets = 76 in last 2 days, will monitor for bleeding   # Hypertension: Noted on problem list        # Overweight: Estimated body mass index is 25.86 kg/m  as calculated from the following:    Height as of this encounter: 1.829 m  (6').    Weight as of this encounter: 86.5 kg (190 lb 11.2 oz)., PRESENT ON ADMISSION       # Financial/Environmental Concerns: none         Disposition Plan      Expected Discharge Date: 01/04/2024      Destination: home with family              Светлана Noble MD  Hospitalist Service  [facility]  Securely message with [employer] (more info)  Text page via [employer] Paging/Directory   ______________________________________________________________________    Interval History     Discussed with nursing staff and chart reviewed this morning. Ongoing Lt arm pain unchanged. Subsequently he was oozing from the tunneled catheter site and the Lazarus drain as well so cascular surgery contacted, and dressing changed, and resolved. Hb did drop to 7. When discussed with patient in the morning rounds before bleeding/Hb drop, he was very reluctant for transfusion given transplant.      Physical Exam   Vital Signs: Temp: 98.6  F (37  C) Temp src: Oral BP: 100/61 Pulse: 71   Resp: 16 SpO2: 97 % O2 Device: None (Room air) Oxygen Delivery: 2 LPM  Weight: 190 lbs 11.2 oz    General: AAOx3, appears comfortable.  HEENT: PERRLA EOMI. Mucosa moist.   Lungs: Bilateral equal air entry. Clear to auscultation, normal work of breathing.   CVS: S1S2 regular, no tachycardia or murmur.   Abdomen: Soft, NT, ND. BS heard.  MSK: Lt arm now with dressing, changed by Dr Mcneil during AM round, has LAZARUS drain in place.   Neuro: AAOX3. CN 2-12 normal. Strength symmetrical.  Skin: No rash.   Patient also has small hematoma at the Rt chest wall, prior catheter site.      Medical Decision Making       45 MINUTES SPENT BY ME on the date of service doing chart review, history, exam, documentation & further activities per the note.         Data         Imaging results reviewed over the past 24 hrs:   No results found for this or any previous visit (from the past 24 hour(s)).

## 2024-01-04 NOTE — TELEPHONE ENCOUNTER
"Call from Blanco.   He did see Dr. Simms last week, Dr. Simms has asked for a kidney transplant eval, Blanco is scheduled next week.  He had an issue at dialysis on Saturday where they \"missed his fistula\" and started dialysis.  Blanco is currently in the hospital at [facility] as he had to have another fistula placed.  Both arms very sore.  They are suggesting he get a blood transfusion for a Hgb of 7, Blanco and his wife are reluctant due to concern for antibody development associated w/ blood transfusions. Dr. Simms agrees that Blanco should get blood.   Blanco and Ofe aware.   "

## 2024-01-04 NOTE — PLAN OF CARE
Summary: Hemodiaylsis, infiltrated fistula     DATE & TIME: 01/03/24 2000-2300  Cognitive Concerns/ Orientation: A&Ox4. Calm, cooperative, and pleasant.   BEHAVIOR & AGGRESSION TOOL COLOR: Green   CIWA SCORE: NA   ABNL VS/O2: VSS post surgical repair of LUE fistula. with BP in the 100's. On RA   MOBILITY: SBA post surgery. Prev independent. Bed alarm on.   PAIN MANAGMENT: 6-7/10 LUE discomfort. PRN Oxycodone and Tylenol given x1. 5/10 R chest hematoma, and L wrist (vascular donor site) discomfort.   DIET: Advanced to Renal diet after surgical procedure. Good appetite and tolerating.   BOWEL/BLADDER: Continent of bowel- no BM this shift; hemodialysis- producing very minimal urine.   ABNL LAB/BG: Na 134, Urea 54.6, Crea 6.34, GFR 9, WBC 3.8, Hgb 8.6, Plat 76. Recheck in AM.   DRAIN/DEVICES: R PIV and RFA SL. CVC tunneled cath in R. Jugular.   TELEMETRY RHYTHM: NA  SKIN: L arm +2 edema- sensation intact- elevate on pillows with gauze post surgical dressing to stay in place for 24-48h. Shadowing of Sanguinous drainage noted in PACU and expected per Vascular surgery d/t large hematoma drained. Reinforced. LAZARUS drain in LUE with 15mL of dark red blood output this shift. NEW Dialysis port small previous hematoma present and marked. Dressing CDI. Dialysis M,W,F.  TESTS/PROCEDURES: New tunneled dialysis catheter placed 1/3. Revision of AV fistula and hematoma evaluation completed. AM labs   D/C DAY/GOALS/PLACE: Home pending improvement  OTHER IMPORTANT INFO: Plan for dialysis today to get back on regular MWF schedule. Nephrology and Vascular following. Fistula in LUE unable to be used for 4 weeks. New CVC in place for HD.

## 2024-01-04 NOTE — PLAN OF CARE
Summary: Hemodiaylsis, infiltrated fistula     DATE & TIME: 01/03-01/04/24 Night shift  Cognitive Concerns/ Orientation: A&Ox4. Calm, cooperative, and pleasant.   BEHAVIOR & AGGRESSION TOOL COLOR: Green   CIWA SCORE: NA   ABNL VS/O2: VSS On RA   MOBILITY: SBA post surgery. Prev independent. Bed alarm on; did not get out of bed all shift  PAIN MANAGMENT: 6-7/10 LUE discomfort. PRN Oxycodone with relief  DIET: Renal diet   BOWEL/BLADDER: Continent of bowel- no BM this shift; hemodialysis- producing very minimal urine.   ABNL LAB/BG: New labs in AM.   DRAIN/DEVICES: R PIV and RFA SL. New CVC tunneled cath in R chest- previous hematoma around site marked  TELEMETRY RHYTHM: NA  SKIN: L arm +2 edema- sensation intact- elevate on pillows; post surgical dressing to stay in place for 24-48h until surgeon changes; LAZARUS drain in LUE with 15mL of dark red blood output this shift. NEW Dialysis CVC port with small previous hematoma present and marked. Dressing CDI. Dialysis M,W,F.  TESTS/PROCEDURES: New tunneled dialysis catheter placed 1/3. Revision of AV fistula and hematoma evaluation completed- PODx1  D/C DAY/GOALS/PLACE: Home pending improvement- Dialysis on Friday  OTHER IMPORTANT INFO: Nephrology and Vascular following. Fistula in LUE unable to be used for 4 weeks. New CVC in place for HD.

## 2024-01-04 NOTE — BRIEF OP NOTE
[facility]    Brief Operative Note    Pre-operative diagnosis: Bleeding pseudoaneurysm of left brachiocephalic AV fistula (H24) [T85.615J]  Post-operative diagnosis Same as pre-operative diagnosis    Procedure: Excision and repair of LEFT brachiocephalic arteriovenous fistula and vein patch angioplasty, Left - Arm    Surgeon: Surgeon(s) and Role:     * Deejay Mcneil MD - Primary     * Andria Klein MD - Fellow - Assisting  Anesthesia: General   Estimated Blood Loss: 250 mL from 1/3/2024  4:04 PM to 1/3/2024  6:46 PM      Drains: Billy-Buck  Specimens: * No specimens in log *  Findings:   A bleeding PSA repaired primarily. Stenosis of the outflow tract repaired with cephalic vein patch. There was [id number] cm hematoma evacuated and the cavity irrigated .  Complications: None.  Implants: * No implants in log *

## 2024-01-04 NOTE — PLAN OF CARE
Goal Outcome Evaluation:    Summary: Hemodiaylsis, infiltrated fistula     DATE & TIME: 1/4/24 [id number]  Cognitive Concerns/ Orientation: A&Ox4. Calm, cooperative, and pleasant.   BEHAVIOR & AGGRESSION TOOL COLOR: Green   CIWA SCORE: NA   ABNL VS/O2: VSS On RA, except soft BP in 100's   MOBILITY: Independent  PAIN MANAGMENT: C/O LUE managed with PRN Oxy x 2 and rest  DIET: Renal diet   BOWEL/BLADDER: Continent of bowel- no BM this shift; hemodialysis- producing very minimal urine.   ABNL LAB/BG: Hgb 7.0 (Pt. Refused blood transfusion, MD aware); Hct 21.2. q6 Hgb/Hct checks  DRAIN/DEVICES: R PIV SL. New CVC tunneled cath in R chest- previous hematoma around site marked.   TELEMETRY RHYTHM: NA  SKIN: L arm +2 edema- sensation intact- elevate on pillows; post surgical dressing saturated in blood from site, surgeon redressed dressing C/D/I. LAZARUS drain in LUE with 25 mL of dark red blood output this shift. NEW Dialysis CVC port with small previous hematoma present and marked. Dressing was saturated in blood and replaced with gauze C/D/I.   TESTS/PROCEDURES: none new  D/C DAY/GOALS/PLACE: Home pending improvement - Dialysis on Friday  OTHER IMPORTANT INFO: Nephrology and Vascular following. Fistula in LUE unable to be used for 4 weeks. New CVC in place for HD. Dialysis MW,FVannesa

## 2024-01-04 NOTE — TELEPHONE ENCOUNTER
Received a call from the patient stating he is currently in the hospital because they needed to re-do his fistula and place a temporary catheter for dialysis.     Patient states his hemoglobin came back low today.     Hemoglobin   Date Value Ref Range Status   01/04/2024 7.0 (L) 13.3 - 17.7 g/dL Final   04/20/2020 14.3 13.3 - 17.7 g/dL Final     Patient states they are advising patient to receive a blood transfusion but he would like to know the opinion of his PCP due to his history of a liver transplant?     Will route HP to PCP for review and recommendations.     Can we leave a detailed message on this number? YES  Phone number patient can be reached at: Home number on file [phone] (home)    Melissa Arevalo RN  [facility] Triage

## 2024-01-04 NOTE — ANESTHESIA CARE TRANSFER NOTE
Patient: Blanco Osborne    Procedure: Procedure(s):  Excision and repair of LEFT brachiocephalic arteriovenous fistula and vein patch angioplasty       Diagnosis: Bleeding pseudoaneurysm of left brachiocephalic AV fistula (H24) [T82.838A]  Diagnosis Additional Information: No value filed.    Anesthesia Type:   General     Note:    Oropharynx: oropharynx clear of all foreign objects  Level of Consciousness: awake  Oxygen Supplementation: face mask    Independent Airway: airway patency satisfactory and stable  Dentition: dentition unchanged  Vital Signs Stable: post-procedure vital signs reviewed and stable  Report to RN Given: handoff report given  Patient transferred to: PACU    Handoff Report: Identifed the Patient, Identified the Reponsible Provider, Reviewed the pertinent medical history, Discussed the surgical course, Reviewed Intra-OP anesthesia mangement and issues during anesthesia, Set expectations for post-procedure period and Allowed opportunity for questions and acknowledgement of understanding          Electronically Signed By: STEFFANY Osorio CRNA  January 3, 2024  6:45 PM

## 2024-01-04 NOTE — PROGRESS NOTES
Vascular Surgery Progress Note:  POD#1    S: Much more comfortable today.    O:   Vitals:  BP  Min: 96/56  Max: 148/79  Temp  Av.9  F (36.6  C)  Min: 97.1  F (36.2  C)  Max: 98.6  F (37  C)  Pulse  Av.5  Min: 63  Max: 83  I/O last 3 completed shifts:  In: 940 [P.O.:240; I.V.:700]  Out: 265 [Drains:15; Blood:250]    Physical Exam: Alert and appropriate.  Conversant.   Small amount of LAZARUS output   Dressings removed from left upper arm.  Only mild swelling noted.    Nylon suture wound=A    Good pulse in aVF    Normal CMS   Redressed with fluffs/Kerlix rolls      Assessment/Plan: #1.  Doing well following repair of pseudoaneurysm and evacuation large hematoma left upper arm AVF.  Vein patch to stenotic area in mid graft to improve offloading.     Had a large amount of bleeding at the time of surgery.  Will check hemoglobin.     Leave drain in today.     #2.  Right jugular tunneled catheter placed yesterday.  Plan dialysis in the morning.  Drain will be removed at that time at home.       #3.  Medical team discussing Eliquis with patient.  Has a history of DVT/PE earlier this year but also intermittent atrial fibrillation as reason for chronic use.           Wm. Tarun MD

## 2024-01-04 NOTE — OP NOTE
OPERATIVE NOTE    PROCEDURE DATE: 1/3/2024      PRE-OP DIAGNOSIS: #1. Left upper arm arteriovenous fistula pseudoaneurysm with large hematoma     #2.  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  Vascular control with repair of left upper arm arteriovenous fistula pseudoaneurysm    -- Evacuation of large hematoma and pseudoaneurysm capsule     #2.  Cephalic vein patch angioplasty to mid upper arm fistula stenosis    -- Excision of intimal hyperplasia    -- [place] left wrist cephalic vein      SURGEON:  Deejay Mcneil M.D.      ASSISTANT:  Andria Klein MD (Vascular Fellow)      ANESTHESIA:  General- LMA      PRE-OP MEDICATIONS: Ancef 2 g IV      INDICATIONS FOR PROCEDURE: 58-year-old patient on chronic hemodialysis via left upper arm arteriovenous fistula.  He was noted to have a mild stenosis in the midportion of the of the fistula but had been functioning well and was well dilated proximally and distally to this.  On his home dialysis several days ago he developed expanding hematoma from the access site close to the elbow.  Patient is been admitted.  Duplex reveals a large pseudoaneurysm measuring 20 x 10 x 5 cm with an active pseudoaneurysm in the posterior aspect of the fistula going into the pseudoaneurysm.  We also noted the stenosis in the midportion of the fistula.  Surgical repair is indicated.  Will plan to review repair of the pseudoaneurysm defect and evacuate the hematoma and also perform a vein patch angioplasty to the stenotic area to improve the outflow.  Right jugular tunneled catheter for dialysis has been placed by interventional radiology since the arm will not be able to used for dialysis for multiple weeks.  We identified an excellent left wrist cephalic vein by duplex and marked this.      DESCRIPTION OF PROCEDURE: Brought the op room.  Induced under general esthesia LMA was placed.  Calf pneumatic compression boots  were used along with a pillow under his knees.  The entire left arm and axilla were prepped and draped.  Timeout was called and the sites were identified.    VASCULAR CONTROL: Incision was made in the distal upper arm just medial to the palpable fistula.  Close to the antecubital we dissected down to identify the inflow portion of the fistula measured at least 10 mm in diameter to allow placement of a vascular clamp.  We then extended incision.  We entered into the pseudoaneurysm capsule.  There was active bleeding from the fistula.  Proximal clamp was then applied to prevent backbleeding.    PSEUDOANEURYSM REPAIR: Who evacuated a large portion of the hematoma which I dissected down into the bicep muscles under the fascia.  Pseudoaneurysm capsule was also excised.  This allowed us to debride the fistula and identified the defect in the posterior medial wall.  This was repaired with three 5-0 Prolene mattress sutures.  Clamps were released 2 and a strong pulse and thrill and no ongoing bleeding.    EVACUATION HEMATOMA: We then continued to remove the very organized blood from the hematoma.  This was irrigated with saline solution.  Dry field was noted.    VEIN PATCH: We extended our incision to the mid upper arm to identify the site of stenosis that we had marked with a duplex ultrasound.  The more proximal fistula closer to the armpit was widely patent and this was encircled with a vessel loop.  Due to patient's anticoagulation no heparin was used.  Clamps were reapplied.  11 blade was used after the fistula and this is extended for a length of 7 cm.  We found the dissection plane and organized intimal hyperplasia with a partial endarterectomy being performed.  The outflow excepted a 6 mm dilator and the inflow 8 mm dilator.  Heparinized saline solution was injected.    --We then made an incision in the left wrist.  Dissection was carried down to identify an excellent cephalic vein.  Multiple small branches were  ligated between 4-0 silk suture.  Vein was ligated proximally distally with 2-0 silk suture.  This was transected and dilated with 0.5% Marcaine.  The vein was spatulated and left and normal directional flow with no valve leaflets.  This was then sewn to the venotomy with running 6-0 Prolene suture and loupe magnification.  Prior to complete closure of the patch good inflow and outflow were noted and heparinized saline solution was injected.  Blood flow was allowed to go through the fistula which dilated very nicely to a minimum of 10 mm throughout its entire length.  Several interrupted six 6-0 Prolene suture mattresses were placed on our vein patch for excellent hemostasis.    We had very wrought tissue from the pseudoaneurysm/hematoma.  A #15 fluted drain was placed within the hematoma cavity and brought out through a more proximal incision and secured with a 3-0 silk suture.    The upper arm incision was closed with several interrupted 2-0 Prolene mattress sutures followed by a running 3-0 Prolene suture.  Several interrupted 5-0 Monocryl sutures were also placed.  Good tissue approximation was noted with no excessive tension.    Wrist incision was closed with interrupted 3-0 Vicryl deep and the skin with 4-0 Monocryl in suppurative fashion.    All wounds were infiltrated with 0.5% Marcaine for postoperative analgesia.  Bacitracin to to the incisions followed by gauze ABD pads Kerlix rolls.    Patient taught procedure well.  Was extubated and returned to recovery in status rendition.  Needle sponge counts correct x 2      EBL: 250 mL      COMPLICATIONS: None      OPERATIVE FINDINGS: Large needle hole defect in fistula close to the elbow as it causes a pseudoaneurysm that is been repaired with no narrowing.  Cephalic vein patch over the stenotic area of the fistula to ensure continued function.      Deejay Mcneil MD

## 2024-01-04 NOTE — PROVIDER NOTIFICATION
MD Notification    Notified Person: MD    Notified Person Name: Dr. Mcneil    Notification Date/Time: 1/4/24 7961    Notification Interaction: Vocera Message    Purpose of Notification: Are you able to come to 610. His jugular site and LAZARUS drain site are bleeding through the dressings.    Orders Received:    Comments:

## 2024-01-04 NOTE — ANESTHESIA POSTPROCEDURE EVALUATION
Patient: Blanco Osborne    Procedure: Procedure(s):  Excision and repair of LEFT brachiocephalic arteriovenous fistula and vein patch angioplasty       Anesthesia Type:  General    Note:  Disposition: Inpatient   Postop Pain Control: Uneventful            Sign Out: Well controlled pain   PONV: No   Neuro/Psych: Uneventful            Sign Out: Acceptable/Baseline neuro status   Airway/Respiratory: Uneventful            Sign Out: Acceptable/Baseline resp. status   CV/Hemodynamics: Uneventful            Sign Out: Acceptable CV status; No obvious hypovolemia; No obvious fluid overload   Other NRE: NONE   DID A NON-ROUTINE EVENT OCCUR? No           Last vitals:  Vitals Value Taken Time   /73 01/03/24 1930   Temp 36.7  C (98  F) 01/03/24 1920   Pulse 79 01/03/24 1935   Resp 15 01/03/24 1935   SpO2 95 % 01/03/24 1935       Electronically Signed By: Arpit Osborne DO  January 3, 2024  8:19 PM

## 2024-01-04 NOTE — PROVIDER NOTIFICATION
MD Notification    Notified Person: MD    Notified Person Name: Dr. Noble    Notification Date/Time: 1/4/24 1240    Notification Interaction: Vocera Message    Purpose of Notification: Pt's hemoglobin is 7.0 and is refusing blood transfusion    Orders Received:    Comments:

## 2024-01-05 LAB
BLD PROD TYP BPU: NORMAL
BLOOD COMPONENT TYPE: NORMAL
CODING SYSTEM: NORMAL
CROSSMATCH: NORMAL
HCT VFR BLD AUTO: 21.1 % (ref 40–53)
HCT VFR BLD AUTO: 21.7 % (ref 40–53)
HGB BLD-MCNC: 7 G/DL (ref 13.3–17.7)
HGB BLD-MCNC: 7.3 G/DL (ref 13.3–17.7)
ISSUE DATE AND TIME: NORMAL
UNIT ABO/RH: NORMAL
UNIT NUMBER: NORMAL
UNIT STATUS: NORMAL
UNIT TYPE ISBT: 5100

## 2024-01-05 PROCEDURE — 250N000013 HC RX MED GY IP 250 OP 250 PS 637: Performed by: INTERNAL MEDICINE

## 2024-01-05 PROCEDURE — 36415 COLL VENOUS BLD VENIPUNCTURE: CPT | Performed by: HOSPITALIST

## 2024-01-05 PROCEDURE — P9016 RBC LEUKOCYTES REDUCED: HCPCS | Performed by: HOSPITALIST

## 2024-01-05 PROCEDURE — 120N000001 HC R&B MED SURG/OB

## 2024-01-05 PROCEDURE — 250N000011 HC RX IP 250 OP 636: Performed by: STUDENT IN AN ORGANIZED HEALTH CARE EDUCATION/TRAINING PROGRAM

## 2024-01-05 PROCEDURE — 99233 SBSQ HOSP IP/OBS HIGH 50: CPT | Performed by: HOSPITALIST

## 2024-01-05 PROCEDURE — 250N000011 HC RX IP 250 OP 636: Performed by: INTERNAL MEDICINE

## 2024-01-05 PROCEDURE — 250N000013 HC RX MED GY IP 250 OP 250 PS 637: Performed by: HOSPITALIST

## 2024-01-05 PROCEDURE — 250N000012 HC RX MED GY IP 250 OP 636 PS 637: Performed by: STUDENT IN AN ORGANIZED HEALTH CARE EDUCATION/TRAINING PROGRAM

## 2024-01-05 PROCEDURE — 250N000013 HC RX MED GY IP 250 OP 250 PS 637: Performed by: STUDENT IN AN ORGANIZED HEALTH CARE EDUCATION/TRAINING PROGRAM

## 2024-01-05 PROCEDURE — 85018 HEMOGLOBIN: CPT | Performed by: HOSPITALIST

## 2024-01-05 PROCEDURE — 258N000003 HC RX IP 258 OP 636: Performed by: INTERNAL MEDICINE

## 2024-01-05 PROCEDURE — 85014 HEMATOCRIT: CPT | Performed by: HOSPITALIST

## 2024-01-05 PROCEDURE — 90937 HEMODIALYSIS REPEATED EVAL: CPT

## 2024-01-05 RX ORDER — HEPARIN SODIUM 1000 [USP'U]/ML
4 INJECTION, SOLUTION INTRAVENOUS; SUBCUTANEOUS ONCE
Qty: 4 ML | Refills: 0 | Status: COMPLETED | OUTPATIENT
Start: 2024-01-05 | End: 2024-01-05

## 2024-01-05 RX ORDER — MIDODRINE HYDROCHLORIDE 5 MG/1
10 TABLET ORAL
Status: COMPLETED | OUTPATIENT
Start: 2024-01-05 | End: 2024-01-05

## 2024-01-05 RX ADMIN — LACOSAMIDE 100 MG: 50 TABLET, FILM COATED ORAL at 19:58

## 2024-01-05 RX ADMIN — SEVELAMER CARBONATE 800 MG: 800 TABLET, FILM COATED ORAL at 17:33

## 2024-01-05 RX ADMIN — Medication 1 CAPSULE: at 12:30

## 2024-01-05 RX ADMIN — APIXABAN 5 MG: 5 TABLET, FILM COATED ORAL at 15:07

## 2024-01-05 RX ADMIN — APIXABAN 5 MG: 5 TABLET, FILM COATED ORAL at 19:59

## 2024-01-05 RX ADMIN — HYDROMORPHONE HYDROCHLORIDE 0.3 MG: 0.2 INJECTION, SOLUTION INTRAMUSCULAR; INTRAVENOUS; SUBCUTANEOUS at 22:23

## 2024-01-05 RX ADMIN — TACROLIMUS 0.5 MG: 0.5 CAPSULE ORAL at 12:30

## 2024-01-05 RX ADMIN — MIDODRINE HYDROCHLORIDE 10 MG: 5 TABLET ORAL at 12:29

## 2024-01-05 RX ADMIN — MIDODRINE HYDROCHLORIDE 10 MG: 5 TABLET ORAL at 08:04

## 2024-01-05 RX ADMIN — CIPROFLOXACIN HYDROCHLORIDE 250 MG: 250 TABLET, FILM COATED ORAL at 15:07

## 2024-01-05 RX ADMIN — SODIUM CHLORIDE 250 ML: 9 INJECTION, SOLUTION INTRAVENOUS at 09:18

## 2024-01-05 RX ADMIN — Medication 1 SPRAY: at 21:16

## 2024-01-05 RX ADMIN — TACROLIMUS 0.5 MG: 0.5 CAPSULE ORAL at 17:33

## 2024-01-05 RX ADMIN — GABAPENTIN 300 MG: 300 CAPSULE ORAL at 21:15

## 2024-01-05 RX ADMIN — HYDROMORPHONE HYDROCHLORIDE 0.3 MG: 0.2 INJECTION, SOLUTION INTRAMUSCULAR; INTRAVENOUS; SUBCUTANEOUS at 17:30

## 2024-01-05 RX ADMIN — SEVELAMER CARBONATE 800 MG: 800 TABLET, FILM COATED ORAL at 06:25

## 2024-01-05 RX ADMIN — HYDROMORPHONE HYDROCHLORIDE 0.3 MG: 0.2 INJECTION, SOLUTION INTRAMUSCULAR; INTRAVENOUS; SUBCUTANEOUS at 12:24

## 2024-01-05 RX ADMIN — SEVELAMER CARBONATE 800 MG: 800 TABLET, FILM COATED ORAL at 12:29

## 2024-01-05 RX ADMIN — OXYCODONE HYDROCHLORIDE 5 MG: 5 TABLET ORAL at 15:07

## 2024-01-05 RX ADMIN — DOCUSATE SODIUM 50 MG AND SENNOSIDES 8.6 MG 1 TABLET: 8.6; 5 TABLET, FILM COATED ORAL at 06:28

## 2024-01-05 RX ADMIN — IRON SUCROSE 200 MG: 20 INJECTION, SOLUTION INTRAVENOUS at 11:13

## 2024-01-05 RX ADMIN — OXYCODONE HYDROCHLORIDE 5 MG: 5 TABLET ORAL at 06:27

## 2024-01-05 RX ADMIN — HYDROMORPHONE HYDROCHLORIDE 0.3 MG: 0.2 INJECTION, SOLUTION INTRAMUSCULAR; INTRAVENOUS; SUBCUTANEOUS at 08:04

## 2024-01-05 RX ADMIN — HYDROMORPHONE HYDROCHLORIDE 0.3 MG: 0.2 INJECTION, SOLUTION INTRAMUSCULAR; INTRAVENOUS; SUBCUTANEOUS at 19:58

## 2024-01-05 RX ADMIN — LACOSAMIDE 50 MG: 50 TABLET, FILM COATED ORAL at 12:30

## 2024-01-05 RX ADMIN — MIDODRINE HYDROCHLORIDE 10 MG: 5 TABLET ORAL at 09:14

## 2024-01-05 RX ADMIN — Medication: at 09:19

## 2024-01-05 RX ADMIN — SODIUM CHLORIDE 300 ML: 9 INJECTION, SOLUTION INTRAVENOUS at 09:18

## 2024-01-05 RX ADMIN — FERROUS SULFATE TAB 325 MG (65 MG ELEMENTAL FE) 325 MG: 325 (65 FE) TAB at 12:30

## 2024-01-05 RX ADMIN — MIDODRINE HYDROCHLORIDE 10 MG: 5 TABLET ORAL at 17:33

## 2024-01-05 ASSESSMENT — ACTIVITIES OF DAILY LIVING (ADL)
ADLS_ACUITY_SCORE: 30

## 2024-01-05 NOTE — PROGRESS NOTES
Vascular Surgery Progress Note    Subjective:  Still having some pain in the left arm.  Otherwise doing okay.    Objective:    *Vitals, labs, intake and output, pertinent imaging, and other pertinent studies were reviewed*    Gen: NAD  HEENT: NCAT  Vascular: Palpable fistula on the left upper arm, incision clean/dry/intact with sutures in place, wrist incision is clean/dry/intact, drain with serosanguineous drainage  Neuro: Moving all extremities  Psych: Cooperative    Assessment and Plan:  58 year old male status post pseudoaneurysm repair and hematoma evacuation of left upper arm AV fistula as well as patch repair of stenosis.    Received 1 unit.  Responded to 7.3 from 6.3.  Receiving dialysis today.  May able to go home today.  Okay for restarting Eliquis today.  Will remove the drain after dialysis.    Andria Klein MD  Vascular Surgery Fellow        BP 90/50 (BP Location: Right arm, Patient Position: Semi-Salgado's, Cuff Size: Adult Regular)   Pulse 67   Temp 98.2  F (36.8  C) (Oral)   Resp 18   Ht 1.829 m (6')   Wt 89.3 kg (196 lb 14.4 oz)   SpO2 98%   BMI 26.70 kg/m       Intake/Output Summary (Last 24 hours) at 1/5/2024 2014  Last data filed at 1/5/2024 0607  Gross per 24 hour   Intake 900 ml   Output 35 ml   Net 865 ml

## 2024-01-05 NOTE — PLAN OF CARE
Summary: Hemodiaylsis, infiltrated fistula     DATE & TIME: 1/4/24 Evenings   Cognitive Concerns/ Orientation: A&Ox4. Calm, cooperative.   BEHAVIOR & AGGRESSION TOOL COLOR: Green   CIWA SCORE: NA   ABNL VS/O2: VSS On RA, except soft BP in 100's   MOBILITY: Independent  PAIN MANAGMENT: C/O LUE managed with PRN Oxy x2, PRN tylenol x1 for 6-8/10 pain.   DIET: Renal diet. Good appetite   BOWEL/BLADDER: Continent of bowel- no BM this shift; hemodialysis- producing very minimal urine.   ABNL LAB/BG: Hgb 7.0 (Pt. Refused blood transfusion, MD aware), dropped to 6.7 and pt agreed to transfusion. 1 PRBC given.  Hct 21.2. Q6 Hgb/Hct checks  DRAIN/DEVICES: R PIV SL. New CVC tunneled cath in R chest- previous hematoma around site marked. LUE wrapped in gauze post repair with LAZARUS drain in place.   TELEMETRY RHYTHM: NA  SKIN: L arm +2 edema- sensation intact- elevate on pillows. LAZARUS drain in LUE with scant amount of dark red blood output this shift. NEW Dialysis CVC port with small previous hematoma present and marked.   TESTS/PROCEDURES: AM labs. HD scheduled for tomorrow.   D/C DAY/GOALS/PLACE: Home pending improvement - Dialysis on Friday  OTHER IMPORTANT INFO: Nephrology and Vascular following. Fistula in LUE unable to be used for 4 weeks. New CVC in place for HD. Dialysis M,W,F.

## 2024-01-05 NOTE — UTILIZATION REVIEW
Admission Status; Secondary Review Determination    Under the authority of the Utilization Management Committee, the utilization review process indicated a secondary review on the above patient. The review outcome is based on review of the medical records, discussions with staff, and applying clinical experience noted on the date of the review.    (x) Inpatient Status Appropriate - This patient's medical care is consistent with medical management for inpatient care and reasonable inpatient medical practice.    RATIONALE FOR DETERMINATION: Complex 58-year-old male with history of cirrhosis, end-stage renal disease on dialysis who presents to the hospital due to inability to dialyze due to fistula malfunction and significant pain around the fistula site.  Patient has history of intra dialysis hypotension on midodrine.  With outpatient dysfunction of AV fistula which has had multiple revisions including August and the end of December 2023 now going on 5+ days without dialysis.  Patient was admitted to the hospital with uremic symptoms requiring urgent dialysis as well as evaluation of the AV fistula access.  Patient noted to have significant left arm swelling after attempted infiltration 2 days prior to admission able to undergo dialysis on the day of admission and the following day and due to the fistula dysfunction and significant swelling required vascular surgery evaluation.  Vascular surgery recommending repeat arterial ultrasound.  Patient with significant difficulty with adequate pain control required frequent doses of IV narcotics in addition to oral oxycodone.  Specifically requiring 8 doses of IV rescue narcotics on hospital day 2, thus not safe for discharge due to inadequate pain control as well as evaluating patient's fistula.  Patient again required 8 doses of intravenous narcotics on hospital day 3.  Interventional radiology placed a right external jugular catheter for recurrent urgent dialysis.   Vascular surgery noted ultrasonic evaluation revealing significant hematoma to the left upper arm causing patient's severe pain in addition to PSA stenosis.  Patient thus underwent urgent surgery on hospital day 3 of the evacuation of large hematoma and pseudoaneurysm capsule, vascular control with repair of left upper arm AV fistula pseudoaneurysm and cephalic vein patch angioplasty to the mid upper arm fistula stenosis.  Patient had significant large amount of bleeding perioperatively with a 2 g drop in hemoglobin compared to admission down to 6.7.  After 1 unit of packed red blood cells patient's hemoglobin yani from 6.7 up to 7.0 on hospital day 5.  The complexity of these issues including severe pain from left arm with large hematoma, need for urgent dialysis with AV fistula dysfunction, need for urgent surgical intervention to remove hematoma with perioperative bleeding resulting in a significant multiple day hospital stay appropriate for inpatient care.    At the time of admission with the information available to the attending physician more than 2 nights Hospital complex care was anticipated, based on patient risk of adverse outcome if treated as outpatient and complex care required. Inpatient admission is appropriate based on the Medicare guidelines.    This document was produced using voice recognition software    The information on this document is developed by the utilization review team in order for the business office to ensure compliance. This only denotes the appropriateness of proper admission status and does not reflect the quality of care rendered.    The definitions of Inpatient Status and Observation Status used in making the determination above are those provided in the CMS Coverage Manual, Chapter 1 and Chapter 6, section 70.4.    Sincerely,    Magdi Ta MD  Utilization Review  Physician Advisor  [facility].

## 2024-01-05 NOTE — PROVIDER NOTIFICATION
On Call paged for Hgb of 6.7.   Pt had initially refused when at 7.0, now agreeable to transfusion.   Consent signed 1/3/24    Orders in place to transfuse, reviewed and released.

## 2024-01-05 NOTE — PROGRESS NOTES
Care Management Follow Up    Length of Stay (days): 4    Expected Discharge Date: 01/06/2024     Concerns to be Addressed: all concerns addressed in this encounter  Post operative cares  Patient plan of care discussed at interdisciplinary rounds: Yes    Anticipated Discharge Disposition: Home     Anticipated Discharge Services: None  Anticipated Discharge DME: None    Patient/family educated on Medicare website which has current facility and service quality ratings:    Education Provided on the Discharge Plan:    Patient/Family in Agreement with the Plan: yes    Referrals Placed by CM/SW: Scheduled Follow-up appointments  Private pay costs discussed: Not applicable    Additional Information:  Anticipate discharge tomorrow.  Pt home with wife.   Does Dialysis at [facility] [phone]    Call and updated of return on Monday.  At discharge they would like discharge summary faxed to [phone].    Pt/wife do not anticipate any needs at discharge.    CC will watch and fax at discharge.     Alethea Mclean RN

## 2024-01-05 NOTE — PROGRESS NOTES
[facility]  Hospitalist Progress Note   01/05/2024          Assessment and Plan:       Blanco Osborne is a 58 year old male with ESRD on HD with Left UE fistula and recently removed Jungular Tunneled cath, liver cirrhosis s/p transplant, portal hypertension, SBP, PE, A-fib, on DOAC, seizures, CHRISTIAN not on CPAP admitted on 1/1/2024, for urgent hemodialysis and need for fistula reevaluation vs new central access need.        ESRD on HD, with missed HD  Fistual complication  Hx left AV fistula pseudoaneurysm repair, 8/2023, 12/21/23, 1/4/24  Stenosis of the AVF  Hematoma Lt arm around AVF with with pseudoaneurysm  Stenosis of the fistula   HTN w hx chronic hypotension, on midodrine    Here with fistula access concern and missed HD,  At this time using fistula which has had multiple past access issues noted in mid LUE AV fistula stenosis per Dr. Mendoza.  follows w Dr Bradshaw for nephrology through the [facility].He dialyzes at [facility] . pt last completed HD was 12/27/23, unsuccessful HD attempt 12/30 was aborted due to hypotension, evaluated at  [facility], and later same day also with [facility] ED, renal aware, urgent dialysis deferred. presented 1/1/2024 outpatient HD but fistula access declined by hemodialysis site and sent to ED.  Patient endorsing MALDONADO x 2-3 days with volume overload sx.  Mild weakness, and  (baseline 39), creatinine 11.96.      -Nephrology consulted and following, was able to access aVF and dialyze 1/1 and 1/2.  -Vascular surgery following, arterial doppler and non vasc US lt UE completed, shows patent AV fistula with moderate stenosis.  It also showed hematoma 20 x 6 x 4 with pseudoaneurysm.   -S/p pseudoaneurym repair and cephalic vein patch angioplasty 1/3  -Tunneled catheter placed 1/3.   -- Continues to complain of ongoing and left arm.  Receiving pain medication.  --Appreciate nephrology comanagement with dialysis.  Vascular surgery comanagement appreciated,  recommend okay to start Eliquis.  Now HD via catheter, dialysis per his MWF undergoing on 1/5  -PTA midodrine, renal vitamin, sevelamer continued  -Pain control, p.o. and IV meds      Acute blood loss on Chronic anemia  Status post 1 unit PRBC transfusion 1/4.  Acute on chronic thrombocytopenia  Baseline hemoglobin 7, presented with hemoglobin of 8.    Also baseline hemoglobin 110-120s, presented with 63.   -Hemoglobin initially stable at 8, then after evacuation of hematoma, down to 7.   -Platelet count stable at 70s  -Peripheral smear shows normocytic normochromic anemia and moderate thrombocytopenia but no abnormal platelet clumping.  No schistocytes or features of hemolysis.  No circulating blast. B12 and folate normal.  Suspect due to bleeding and platelet consumption.   --On 1/4 hemoglobin dropped to 6.7.  Received 1 unit blood transfusion with which hemoglobin this morning is 7.3.   -Transfuse for Hb is or less than 7 or platelet less than 20. Discussed with patient at length and he is very reluctant for transfusion given transplant. Follow H&H q12hr  -Anticoagulation on hold since admission, vascular surgery recommending okay to restart anticoagulation 1/5.     Hx DVT, PE, dx 10/5/2023   Atrial fibrillation on Eliquis  Hx AF w wide complex tachycardia 10/2023 admit, w failed cardioversion x 2.  Heart rate sinus on arrival. No RVR signs.  --Resume Eliquis 1/5/2024 as recommended by vascular surgery and monitor hemoglobin levels every 12 hours.  If hemoglobin continues to drop will consider holding of Eliquis.  No PTA beta-blocker secondary to hypotension.     Hx completed embolic stroke, 11/2022    PTA is on DOAC, as above,   PTA statin resumed.     Hx Liver transplant 2016 with recurrent cirrhosis with evidence of portal hypertension and ascites   Hx Spontaneous Bacterial Peritonitis  11/1/23  Per last GI note, s/p 2016 liver transplantation for metabolic dysfunction associated steatotic liver disease  Saw  GI last month. LFTs ow likely mild cirrhosis .  Patient without any abdominal distention, denies any fevers chills or infectious symptoms.   No   Continued PTA prophylactic ciprofloxacin 250mg daily (renally dosed)   -Hepatology follow up outpatient as scheduled with Dr. Simms       Seizure disorder  - continue Vimpat     Hx Recurrent pericardial effusion   Past hospitalizations reviewed,12/18 last chest x-ray reviewed.    Generalized anxiety disorder  -PTA gabapentin 300 at bedtime  -PTA as needed hydroxyzine     Restless leg syndrome  -PTA ropinirole only used occasionally, not helpful  -PTA as needed gabapentin helps     GERD  -Continue PPI     Hx Moderate protein-energy malnutrition    Nutrition evaluation as outpatient     Probable CHRISTIAN-  NOT on CPAP  --Per patient's wife, he had a sleep study about 10 years ago but does not have any CPAP or BiPAP at home.  Patient confirms he does not have CPAP and has not yet been able to schedule the sleep study.  Thus untreated.  -Monitor for nocturnal hypoxia.  -needs to schedule sleep study as outpatient after discharge    History of prolonged hospitalization [facility] for CAP (*see 10/5/23) d/c summary, had prolonged TCU stay and now able to walk gain.      Orders Placed This Encounter      Renal Diet (dialysis)      DVT Prophylaxis: SCDs, ambulate.  Code Status: Full Code  Disposition: Expected discharge likely 1/6 pending stable hemoglobin    Discussed with patient, bedside RN  >52 minutes spent by me on the date of service doing chart review, history, exam, documentation & further activities per the note.      Tanja Smith MD        Interval History:      Patient lying in bed.  Undergoing dialysis, wife at bedside.  Denies any chest pain or palpitations.  Denies any headache or dizziness.  Denies any nausea or vomiting.  Denies any new tingling or numbness.  Complaining of sore left upper extremity.  Last evening hemoglobin dropped to 6.7, received 1 unit blood  transfusion with which this morning hemoglobin at 7.3         Physical Exam:        Physical Exam   Temp:  [97.7  F (36.5  C)-99.1  F (37.3  C)] 98.4  F (36.9  C)  Pulse:  [64-80] 79  Resp:  [10-23] 16  BP: ()/(49-70) 114/68  SpO2:  [94 %-99 %] 94 %    Intake/Output Summary (Last 24 hours) at 1/5/2024 1433  Last data filed at 1/5/2024 1203  Gross per 24 hour   Intake 540 ml   Output 2010 ml   Net -1470 ml       Admission Weight: 90.7 kg (200 lb)  Current Weight: 89.3 kg (196 lb 14.4 oz)    PHYSICAL EXAM  GENERAL: Patient is in no distress. Alert and oriented.  HEENT: Oropharynx pink  HEART: Regular rate and rhythm. S1S2. No murmurs  LUNGS: Clear to auscultation bilaterally. No expiratory wheeze.  Respirations unlabored  ABDOMEN: Soft, no abdominal tenderness, bowel sounds heard   NEURO moving all extremities  EXTREMITIES: No pedal edema.   SKIN: Warm, dry.   Left upper extremity wrapped in gauze, LAZARUS drain in place.  Left upper extremity edema, warmth and tenderness present.  PSYCHIATRY Cooperative       Medications:         - MEDICATION INSTRUCTIONS for Dialysis Patients -   Does not apply See Admin Instructions    [Held by provider] apixaban ANTICOAGULANT  5 mg Oral BID    artificial saliva  1 spray Mouth/Throat 4x Daily    ciprofloxacin  250 mg Oral Daily    ferrous sulfate  325 mg Oral Daily    gabapentin  300 mg Oral At Bedtime    lacosamide  100 mg Oral QPM    lacosamide  50 mg Oral QAM    midodrine  10 mg Oral TID w/meals    multivitamin RENAL  1 capsule Oral Daily    sevelamer carbonate  800 mg Oral TID w/meals    sodium chloride (PF)  3 mL Intracatheter Q8H    tacrolimus  0.5 mg Oral BID IS     acetaminophen, calcium carbonate, HYDROmorphone, hydrOXYzine HCl, lidocaine 4%, lidocaine 4%, lidocaine (buffered or not buffered), lidocaine (buffered or not buffered), naloxone **OR** naloxone **OR** naloxone **OR** naloxone, oxyCODONE, - MEDICATION INSTRUCTIONS -, senna-docusate **OR** senna-docusate,  sevelamer carbonate, sodium chloride (PF), sodium chloride (PF)         Data:      All new lab and imaging data was reviewed.

## 2024-01-05 NOTE — PROGRESS NOTES
Potassium   Date Value Ref Range Status   01/03/2024 4.4 3.4 - 5.3 mmol/L Final   12/29/2022 3.4 3.4 - 5.3 mmol/L Final   04/20/2020 3.9 3.4 - 5.3 mmol/L Final     Potassium POCT   Date Value Ref Range Status   10/05/2023 3.6 3.4 - 5.3 mmol/L Final     Hemoglobin   Date Value Ref Range Status   01/05/2024 7.0 (L) 13.3 - 17.7 g/dL Final   04/20/2020 14.3 13.3 - 17.7 g/dL Final     Creatinine   Date Value Ref Range Status   01/03/2024 6.34 (H) 0.67 - 1.17 mg/dL Final   04/20/2020 1.06 0.66 - 1.25 mg/dL Final     Urea Nitrogen   Date Value Ref Range Status   01/03/2024 54.6 (H) 8.0 - 23.0 mg/dL Final   12/29/2022 46 (H) 7 - 30 mg/dL Final   04/20/2020 23 7 - 30 mg/dL Final     Sodium   Date Value Ref Range Status   01/03/2024 134 (L) 135 - 145 mmol/L Final     Comment:     Reference intervals for this test were updated on 09/26/2023 to more accurately reflect our healthy population. There may be differences in the flagging of prior results with similar values performed with this method. Interpretation of those prior results can be made in the context of the updated reference intervals.    04/20/2020 139 133 - 144 mmol/L Final     INR   Date Value Ref Range Status   10/27/2023 1.67 (H) 0.85 - 1.15 Final   10/07/2019 1.20 (H) 0.86 - 1.14 Final      Latest Reference Range & Units 01/04/24 15:16   Hepatitis B Surface Antibody Instrument Value <8.5 m[IU]/mL <3.50   Hepatitis B Surface Antibody  Nonreactive       DIALYSIS PROCEDURE NOTE  Hepatitis status of previous patient on machine log was checked and verified ok to use with this patients hepatitis status.    Patient dialyzed for 3.5 hrs. on a K3 bath with a net fluid removal of  2L.    A BFR of 400 ml/min was obtained via a rigt cvc      The treatment plan was discussed with Dr. Fink during the treatment.      Total heparin received during the treatment: 0 units.   Line flushed, clamped and capped with heparin 1:1000 2 mL (2000 units) per lumen    Meds  given: midodrine    Complications: hypotension       Person educated: patient. Knowledge base substantial. Barriers to learning: none. Educated on procedure via verbal mode. The patient verbalized understanding. Pt prefers verbal education style.       ICEBOAT? Timeout performed pre-treatment  I: Patient was identified using 2 identifiers  C:  Consent Signed Yes  E: Equipment preventative maintenance is current and dialysis delivery system OK to use    O: Dialysis orders present and complete prior to treatment  A: Vascular access verified and assessed prior to treatment  T: Treatment was performed at a clinically appropriate time  ?: Patient was allowed to ask questions and address concerns prior to treatment  See Adult Hemodialysis flowsheet in [employer] for further details and post assessment.  Machine water alarm in place and functioning. Transducer pods intact and checked every 15min.   Pt returned via bed.  Chlorine/Chloramine water system checked every 4 hours.  Outpatient Dialysis at [facility]    Patient repositioned every 2 hours during the treatment.  Post treatment report given to Bacilio Decker RN regarding 2L of fluid removed, last BP of 102/69, and patient pain rating of 6/10.

## 2024-01-05 NOTE — PROVIDER NOTIFICATION
MD Notification    Notified Person: MD    Notified Person Name: Vannesa Smith     Notification Date/Time: 1/5/2024    Notification Interaction: Vocera    Purpose of Notification: Pt left arm has increased swelling since this AM with bruising underneath arm. measured circumference of upper arm 13 inches, at bicep over dressing about 14 inches, and right below elbow at 12 inches.     Orders Received:    Comments:

## 2024-01-05 NOTE — DISCHARGE INSTRUCTIONS
Follow-up with Dr. Mcneil in 2 weeks.  His clinic should call to schedule.  [address] [facility], [address]. [place] [address].  [phone].

## 2024-01-05 NOTE — PLAN OF CARE
Goal Outcome Evaluation:        DATE & TIME: 1/4/24-1/5/24 1583-2196   Cognitive Concerns/ Orientation: A&Ox4. Calm, cooperative.   BEHAVIOR & AGGRESSION TOOL COLOR: Green   CIWA SCORE: NA   ABNL VS/O2: Soft BP, other VSS On RA,   MOBILITY: Independent  PAIN MANAGMENT: LUE pain managed with PRN Oxy x1    DIET: Renal diet.   BOWEL/BLADDER: Continent of BM- no BM this shift; gave PRN Senna this AM; Pt on HD  ABNL LAB/BG: Hgb 7.3, hematocrit 21.7. Patient received 1 unit of blood on evening shift for Hgb of 6.7. Next recheck this AM  DRAIN/DEVICES: R PIV SL. New CVC tunneled cath in R chest- previous hematoma around site marked. LUE wrapped in gauze, with LAZARUS drain in place, draining bright red output.   TELEMETRY RHYTHM: NA  SKIN: L arm +2 edema, elevated on pillows. NEW Dialysis CVC port on R chest, previous hematoma around on R chest marked. Scattered scabs, bruises  TESTS/PROCEDURES: HD scheduled for today. (M/W/F)  D/C DAY/GOALS/PLACE: Home pending improvement   OTHER IMPORTANT INFO: Nephrology and Vascular following. Fistula in LUE unable to be used for 4 weeks. New CVC in place for HD.

## 2024-01-05 NOTE — PROGRESS NOTES
VASCULAR SURGERY    Seen on dialysis run using the right jugular tunneled catheter.  Left arm is still sore as expected but improving.  Decreasing LAZARUS output.  No forearm swelling.  Normal CMS      IMPRESSION: Doing well.  Will remove LAZARUS drain at time of discharge today.  Okay to resume anticoagulation including Eliquis.    Will see us in the office in approximately 2 weeks to remove sutures in the left upper arm.    Should be able to reinitiate use of left forearm fistula in approximately 4 weeks.    Will send home with oxycodone due to the very sore left arm from the pseudoaneurysm and extensive hematoma.    Deejay Mcneil MD

## 2024-01-06 VITALS
WEIGHT: 196.9 LBS | TEMPERATURE: 99.1 F | SYSTOLIC BLOOD PRESSURE: 106 MMHG | HEART RATE: 80 BPM | HEIGHT: 72 IN | DIASTOLIC BLOOD PRESSURE: 64 MMHG | RESPIRATION RATE: 17 BRPM | BODY MASS INDEX: 26.67 KG/M2 | OXYGEN SATURATION: 96 %

## 2024-01-06 LAB
ALBUMIN SERPL BCG-MCNC: 3.5 G/DL (ref 3.5–5.2)
ALP SERPL-CCNC: 209 U/L (ref 40–150)
ALT SERPL W P-5'-P-CCNC: <5 U/L (ref 0–70)
ANION GAP SERPL CALCULATED.3IONS-SCNC: 8 MMOL/L (ref 7–15)
AST SERPL W P-5'-P-CCNC: 23 U/L (ref 0–45)
BACTERIA BLD CULT: NO GROWTH
BACTERIA BLD CULT: NO GROWTH
BILIRUB SERPL-MCNC: 1.2 MG/DL
BUN SERPL-MCNC: 55.5 MG/DL (ref 8–23)
CALCIUM SERPL-MCNC: 8.8 MG/DL (ref 8.6–10)
CHLORIDE SERPL-SCNC: 90 MMOL/L (ref 98–107)
CREAT SERPL-MCNC: 6.3 MG/DL (ref 0.67–1.17)
DEPRECATED HCO3 PLAS-SCNC: 29 MMOL/L (ref 22–29)
EGFRCR SERPLBLD CKD-EPI 2021: 10 ML/MIN/1.73M2
ERYTHROCYTE [DISTWIDTH] IN BLOOD BY AUTOMATED COUNT: 15.5 % (ref 10–15)
GLUCOSE BLDC GLUCOMTR-MCNC: 142 MG/DL (ref 70–99)
GLUCOSE SERPL-MCNC: 110 MG/DL (ref 70–99)
HCT VFR BLD AUTO: 23.9 % (ref 40–53)
HGB BLD-MCNC: 7.7 G/DL (ref 13.3–17.7)
HGB BLD-MCNC: 7.8 G/DL (ref 13.3–17.7)
MCH RBC QN AUTO: 30.7 PG (ref 26.5–33)
MCHC RBC AUTO-ENTMCNC: 32.6 G/DL (ref 31.5–36.5)
MCV RBC AUTO: 94 FL (ref 78–100)
PLATELET # BLD AUTO: 79 10E3/UL (ref 150–450)
POTASSIUM SERPL-SCNC: 5.1 MMOL/L (ref 3.4–5.3)
PROT SERPL-MCNC: 6.9 G/DL (ref 6.4–8.3)
RBC # BLD AUTO: 2.54 10E6/UL (ref 4.4–5.9)
SODIUM SERPL-SCNC: 127 MMOL/L (ref 135–145)
WBC # BLD AUTO: 5.2 10E3/UL (ref 4–11)

## 2024-01-06 PROCEDURE — 85027 COMPLETE CBC AUTOMATED: CPT | Performed by: HOSPITALIST

## 2024-01-06 PROCEDURE — 250N000012 HC RX MED GY IP 250 OP 636 PS 637: Performed by: STUDENT IN AN ORGANIZED HEALTH CARE EDUCATION/TRAINING PROGRAM

## 2024-01-06 PROCEDURE — 85018 HEMOGLOBIN: CPT | Performed by: HOSPITALIST

## 2024-01-06 PROCEDURE — 99239 HOSP IP/OBS DSCHRG MGMT >30: CPT | Performed by: HOSPITALIST

## 2024-01-06 PROCEDURE — 36415 COLL VENOUS BLD VENIPUNCTURE: CPT | Performed by: HOSPITALIST

## 2024-01-06 PROCEDURE — 250N000011 HC RX IP 250 OP 636: Performed by: STUDENT IN AN ORGANIZED HEALTH CARE EDUCATION/TRAINING PROGRAM

## 2024-01-06 PROCEDURE — 250N000013 HC RX MED GY IP 250 OP 250 PS 637: Performed by: STUDENT IN AN ORGANIZED HEALTH CARE EDUCATION/TRAINING PROGRAM

## 2024-01-06 PROCEDURE — 250N000013 HC RX MED GY IP 250 OP 250 PS 637: Performed by: HOSPITALIST

## 2024-01-06 PROCEDURE — 80053 COMPREHEN METABOLIC PANEL: CPT | Performed by: HOSPITALIST

## 2024-01-06 RX ADMIN — OXYCODONE HYDROCHLORIDE 5 MG: 5 TABLET ORAL at 13:48

## 2024-01-06 RX ADMIN — Medication 1 SPRAY: at 08:03

## 2024-01-06 RX ADMIN — OXYCODONE HYDROCHLORIDE 5 MG: 5 TABLET ORAL at 09:48

## 2024-01-06 RX ADMIN — LACOSAMIDE 50 MG: 50 TABLET, FILM COATED ORAL at 08:00

## 2024-01-06 RX ADMIN — HYDROMORPHONE HYDROCHLORIDE 0.3 MG: 0.2 INJECTION, SOLUTION INTRAMUSCULAR; INTRAVENOUS; SUBCUTANEOUS at 00:30

## 2024-01-06 RX ADMIN — MIDODRINE HYDROCHLORIDE 10 MG: 5 TABLET ORAL at 12:36

## 2024-01-06 RX ADMIN — MIDODRINE HYDROCHLORIDE 10 MG: 5 TABLET ORAL at 08:02

## 2024-01-06 RX ADMIN — Medication 1 SPRAY: at 12:36

## 2024-01-06 RX ADMIN — APIXABAN 5 MG: 5 TABLET, FILM COATED ORAL at 08:00

## 2024-01-06 RX ADMIN — SEVELAMER CARBONATE 800 MG: 800 TABLET, FILM COATED ORAL at 12:36

## 2024-01-06 RX ADMIN — HYDROMORPHONE HYDROCHLORIDE 0.3 MG: 0.2 INJECTION, SOLUTION INTRAMUSCULAR; INTRAVENOUS; SUBCUTANEOUS at 05:35

## 2024-01-06 RX ADMIN — FERROUS SULFATE TAB 325 MG (65 MG ELEMENTAL FE) 325 MG: 325 (65 FE) TAB at 08:02

## 2024-01-06 RX ADMIN — TACROLIMUS 0.5 MG: 0.5 CAPSULE ORAL at 08:00

## 2024-01-06 RX ADMIN — Medication 1 CAPSULE: at 08:02

## 2024-01-06 RX ADMIN — HYDROMORPHONE HYDROCHLORIDE 0.3 MG: 0.2 INJECTION, SOLUTION INTRAMUSCULAR; INTRAVENOUS; SUBCUTANEOUS at 07:57

## 2024-01-06 RX ADMIN — SEVELAMER CARBONATE 800 MG: 800 TABLET, FILM COATED ORAL at 08:00

## 2024-01-06 ASSESSMENT — ACTIVITIES OF DAILY LIVING (ADL)
ADLS_ACUITY_SCORE: 30

## 2024-01-06 NOTE — PROGRESS NOTES
Care Management Discharge Note    Discharge Date: 01/06/2024       Discharge Disposition: Home    Discharge Services: None    Discharge DME: None    Discharge Transportation: family or friend will provide    Private pay costs discussed: Not applicable    Does the patient's insurance plan have a 3 day qualifying hospital stay waiver?  No    PAS Confirmation Code:    Patient/family educated on Medicare website which has current facility and service quality ratings:      Education Provided on the Discharge Plan:    Persons Notified of Discharge Plans: NA  Patient/Family in Agreement with the Plan: yes    Handoff Referral Completed: No    Additional Information:  Pt discharge to home with wife.  CC did not get to see patient prior to his leaving.   Discharge summary faxed to [facility] at [phone].  Bedside RN reviewed AVS. No other needs identified.     Alethea Mclean RN

## 2024-01-06 NOTE — PLAN OF CARE
Goal Outcome Evaluation:         Summary: Urgent Hemodialysis, infiltrated & pseudoaneurysm of L AVF     DATE & TIME: 01/05/2024 - 01/06/2024 [id number]    Cognitive Concerns/ Orientation: A&Ox4   BEHAVIOR & AGGRESSION TOOL COLOR: Green   ABNL VS/O2: VSS On RA  MOBILITY: Independent  PAIN MANAGMENT: C/o 8/10 LUE pain managed with PRN IV Dilaudid x4  DIET: Renal diet.   BOWEL/BLADDER: Continent of Bowel and Bladder  ABNL LAB/BG: Hgb 7.7, hematocrit 21.1.   DRAIN/DEVICES: R PIV SL. New CVC tunneled cath in R chest- previous hematoma around site marked. LUE wrapped in gauze, with LAZARUS drain in place, draining bright red output.  TELEMETRY RHYTHM: NA  SKIN: L arm +2 to +3 edema, elevated on pillows. NEW Dialysis CVC port on R chest, previous hematoma around on R chest marked. LUE with fistula repair wrapped with gauze and LAZARUS drain -Increased swelling to arm, measuered below elbow 12, bicep 14, and upper arm 13- continued to monitor throughout shift. Increased bruising on anterior side- elevated arm on pillows. Denied numbness and tingling to distal hand and was warm to touch  TESTS/PROCEDURES: None this shift   D/C DAY/GOALS/PLACE: Home pending improvement- possibly  1/06    OTHER IMPORTANT INFO: Nephrology and Vascular following. Fistula in LUE unable to be used for 4 weeks. New CVC in place for HD.

## 2024-01-06 NOTE — PLAN OF CARE
Goal Outcome Evaluation:      Plan of Care Reviewed With: patient, spouse    Summary: Urgent Hemodialysis, infiltrated & pseudoaneurysm of L AVF     DATE & TIME: 01/06/2024 [id number]    Cognitive Concerns/ Orientation: A&Ox4   BEHAVIOR & AGGRESSION TOOL COLOR: Green   ABNL VS/O2: VSS On RA  MOBILITY: Independent  PAIN MANAGMENT: C/o 8/10 LUE pain managed with PRN IV Dilaudid and oxy  DIET: Renal diet.   BOWEL/BLADDER: Continent of Bowel and Bladder  ABNL LAB/BG: Hgb 7.8, hematocrit 23.9  DRAIN/DEVICES: R PIV removed for discharge. New CVC tunneled cath in R chest- previous hematoma around site marked. LUE wrapped in gauze  TELEMETRY RHYTHM: NA  SKIN: L arm +2 to +3 edema, elevated on pillows. NEW Dialysis CVC port on R chest, previous hematoma around on R chest marked. LUE with fistula repair wrapped with gauze. Swelling to L arm. Denied numbness and tingling to distal hand and was warm to touch  TESTS/PROCEDURES: None this shift   D/C DAY/GOALS/PLACE: Home 1/06    OTHER IMPORTANT INFO: Nephrology and Vascular following. Fistula in LUE unable to be used for 4 weeks. New CVC in place for HD.     Discharge    Patient discharged to home via private transport with belongings  Care plan note done- see above    Listed belongings gathered and given to patient (including from security/pharmacy). Yes  Care Plan and Patient education resolved: Yes  Prescriptions if needed, hard copies sent with patient  NA  Medication Bin checked and emptied on discharge Yes  SW/care coordinator/charge RN aware of discharge: Yes

## 2024-01-06 NOTE — DISCHARGE SUMMARY
Discharge Summary  Hospitalist    Date of Admission:  1/1/2024  Date of Discharge:  1/6/2024  Discharging Provider: Tanja Smith MD    Primary Care Physician   Ki Carter  Primary Care Provider Phone Number: [phone]  Primary Care Provider Fax Number: [phone]    PRINCIPAL DIAGNOSIS  ESRD on HD, with missed HD  Hx left AV fistula pseudoaneurysm repair 1/4/24  Hematoma Lt arm around AVF with with pseudoaneurysm  Hyponatremia  Acute blood loss on Chronic anemia  Status post 1 unit PRBC transfusion 1/4.  Acute on chronic thrombocytopenia    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    Cancer (H) 2023    Squamous Cell Cancer- Since resolved    Cirrhosis of liver with ascites (H) 02/11/2016    Coagulopathy (H24)     Critical illness myopathy     Dialysis patient (H24)     DIALYSIS 3X/ WEEK    Embolic stroke (H) 11/20/2022    Encephalopathy     ESRD (end stage renal disease) on dialysis (H)     Gastroesophageal reflux disease     H/O alcohol abuse     History of blood transfusion     Hyperammonemia (H24)     Hyperlipidemia     Hypertension     Patients states that HTN has been resolved    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    Insomnia     Lactic acidosis 11/12/2022    Liver replaced by transplant (H) 09/20/2016    NAFLD (nonalcoholic fatty liver disease) 02/11/2016    CHRISTIAN on CPAP     Parainfluenza type 1 infection 11/19/2022    Parapneumonic effusion     Pleural effusion     Pneumothorax on left 12/16/2022    Respiratory acidosis 11/12/2022    Respiratory arrest (H) 11/12/2022    Restless legs syndrome (RLS)     SBP (spontaneous bacterial peritonitis) (H)     MNGI    Seizures (H) 12/2022    Sepsis due to Streptococcus pneumoniae with  acute hypoxic respiratory failure (H) 11/19/2022    Stroke, embolic (H) 11/20/2022    Sudden cardiac arrest (H) 12/29/2022    PEA- 2 min of CPR    Tubular adenoma 10/2019    Large cecal adenoma- due for surveillance colonoscopy in 3 years (10/2022)       History of Present Illness   Blanco Osborne is an 58 year old male who presented fistula concerns.     Hospital Course   Blanco Osborne is a 58 year old male with ESRD on HD with Left UE fistula and recently removed Jungular Tunneled cath, liver cirrhosis s/p transplant, portal hypertension, SBP, PE, A-fib, on DOAC, seizures, CHRISTIAN not on CPAP admitted on 1/1/2024, for urgent hemodialysis and need for fistula reevaluation vs new central access need.     ESRD on HD, with missed HD  Hx left AV fistula pseudoaneurysm repair, 8/2023, 12/21/23, 1/4/24  Hematoma Lt arm around AVF with pseudoaneurysm  Stenosis of the fistula   HTN with hx chronic hypotension, on midodrine    Hyponatremia  Here with fistula access concern and missed HD,  At this time using fistula which has had multiple past access issues noted in mid LUE AV fistula stenosis per Dr. Mendoza. Follows sosa Bradshaw for nephrology through the [facility] at [facility] . pt last completed HD was 12/27/23, unsuccessful HD attempt 12/30 was aborted due to hypotension, evaluated at  [facility], and later same day also with [facility] includes the [facility] ED, renal aware, urgent dialysis deferred. presented 1/1/2024 outpatient HD but fistula access declined by hemodialysis site and sent to ED.  Patient endorsing MALDONADO x 2-3 days with volume overload sx.  Mild weakness, and  (baseline 39), creatinine 11.96.      -Nephrology followed, was able to access aVF and dialyze 1/1 and 1/2.  -Vascular surgery followed, arterial doppler and non vasc US lt UE completed, shows patent AV fistula with moderate stenosis.  It also showed hematoma 20 x 6 x 4 with pseudoaneurysm.   -S/p pseudoaneurym repair and cephalic vein patch angioplasty  1/3  -Tunneled catheter placed 1/3.   -LAZARUS drain removed 1/5.  Anticoagulation resumed on 5/5 after cleared by vascular surgery   --Postprocedure continue to complain of arm pain, receiving Tylenol, as needed oxycodone.  Vascular surgery recommended to continue pain meds as needed [prescribed by vascular team ]  --Underwent dialysis on 1/5.  Plan to continue prior to admission schedule on Monday Wednesday Friday.  --Vascular surgery cleared patient for discharge.  Plan for discharge with close follow-up.  --Follow-up with PCP in 7 days or earlier if symptomatic.  Monitor BMP in 7 days  PTA midodrine, renal vitamin, sevelamer continued.  Follow-up with vascular surgery in clinic in 2 weeks to remove sutures in left upper arm    Acute blood loss on Chronic anemia  Status post 1 unit PRBC transfusion 1/4.  Acute on chronic thrombocytopenia  Baseline hemoglobin 7, presented with hemoglobin of 8.    Also baseline Platelets  110-120s, presented with 63.   -Hemoglobin initially stable at 8, then after evacuation of hematoma, down to 7.   -Platelet count stable at 70s  -Peripheral smear shows normocytic normochromic anemia and moderate thrombocytopenia but no abnormal platelet clumping.  No schistocytes or features of hemolysis.  No circulating blast. B12 and folate normal. Suspect due to bleeding and platelet consumption.   --Post procedure on 1/4 hemoglobin dropped to 6.7.  Received 1 unit of blood transfusion on 1/4.  On 1/5 hemoglobin dropped to 7.0, received 1 unit of blood transfusion with which hemoglobin improved to 7.7, this morning recheck hemoglobin at 7.8.    -- Vascular surgery okay with starting Eliquis on now 1/5.  Eliquis continued since then.    Noted dressing soaked on 1/6, vascular surgery cleared for discharge.    -- Educated patient, needs close follow-up of hemoglobin.  Monitor hemoglobin levels in 48 hours on Monday with dialysis or earlier if symptomatic.    Monitor platelet levels in 7 to 10  days.  Hold prior to admission Eliquis if any active bleeding and seek immediate medical attention     Hx DVT, PE, dx 10/5/2023   Atrial fibrillation on Eliquis  Hx AF w wide complex tachycardia 10/2023 admit, with failed cardioversion x 2.   --Resumed PTA Eliquis 1/5/2024 as recommended by vascular surgery.  Post resumption of Eliquis hemoglobin in 7.7 > 7.8   Recommend monitoring hemoglobin levels in 48 hours on Monday [with dialysis ] given some soaked dressing with blood.  If hemoglobin continues to drop will need to consider holding of Eliquis.  No PTA beta-blocker secondary to hypotension.     Hx completed embolic stroke, 11/2022    PTA is on DOAC, as above     Hx Liver transplant 2016 with recurrent cirrhosis with evidence of portal hypertension and ascites.  Hx Spontaneous Bacterial Peritonitis 11/1/23.  Patient without abdominal pain.  No fever or infectious symptoms.   Liver enzymes within normal limits.  Noted chronically elevated alkaline phosphatase  Continued PTA prophylactic ciprofloxacin 250mg daily (renally dosed)   Hepatology follow up outpatient as scheduled with Dr. Simms       Seizure disorder  Continue PTA Vimpat     Hx Recurrent pericardial effusion   Past hospitalizations reviewed,12/18 last chest x-ray reviewed.    Generalized anxiety disorder  Restless leg syndrome  -PTA gabapentin 300 at bedtime  -PTA as needed hydroxyzine     Hx Moderate protein-energy malnutrition    Nutrition follow during hospital stay     Probable CHRISTIAN-  NOT on CPAP  Sleep study as outpatient after discharge    History of prolonged hospitalization N for CAP (*see 10/5/23) d/c summary, had prolonged TCU stay and now able to walk gain.     Tanja Smith MD.    Pending Results     Unresulted Labs Ordered in the Past 30 Days of this Admission       No orders found from 12/2/2023 to 1/2/2024.               Physical Exam   Vitals:    01/01/24 0719 01/03/24 0916 01/05/24 0627   Weight: 90.7 kg (200 lb) 86.5 kg (190 lb  11.2 oz) 89.3 kg (196 lb 14.4 oz)     Vital Signs with Ranges  Temp:  [98.2  F (36.8  C)-99.6  F (37.6  C)] 99.1  F (37.3  C)  Pulse:  [72-80] 80  Resp:  [16-18] 17  BP: ()/(57-68) 106/64  SpO2:  [94 %-98 %] 96 %  I/O last 3 completed shifts:  In: 120 [P.O.:120]  Out: 2000 [Other:2000]  PHYSICAL EXAM  GENERAL: Patient is in no distress. Alert and oriented.  HEART: Regular rate and rhythm. S1S2. No murmurs  LUNGS: Respirations unlabored  NEURO moving all extremities  EXTREMITIES: No pedal edema.   SKIN: Warm, dry.   Left upper extremity wrapped in gauze- minimal dressing stained with blood  PSYCHIATRY Cooperative    )Consultations This Hospital Stay   NEPHROLOGY IP CONSULT  VASCULAR SURGERY IP CONSULT  CARE MANAGEMENT / SOCIAL WORK IP CONSULT  NUTRITION SERVICES ADULT IP CONSULT    Time Spent on this Encounter   I, Tanja Smith MD, personally saw the patient today and spent greater than 30 minutes discharging this patient. Discussed with patient, bedside RN.    Discharge Disposition   Discharged to home  Condition at discharge: Good    Discharge Orders      Reason for your hospital stay    You were admitted to the hospital with fistula complication, underwent repair of pseudoaneurysm and evacuation of large hematoma left upper arm AV fistula.  Plan for discharge with close follow-up.     Activity    Your activity upon discharge: activity as tolerated and no driving for today     Monitor and record    Monitor daily blood pressure, heart rate review on provider visit and optimize therapy.     Discharge Instructions    Hold prior to admission Eliquis if any active bleeding and seek immediate medical attention.     Follow-up and recommended labs and tests     Follow up with primary care provider, Ki Carter, within 7 days for hospital follow- up.  The following labs/tests are recommended: Monitor hemoglobin level, BMP on Monday with dialysis or earlier if symptomatic.  Monitor platelet levels in 7 to  10 days    Continue dialysis per schedule.  Monday Wednesday Friday.  Follow-up with vascular surgery in clinic in 2 weeks to remove sutures in left upper arm    Age-appropriate health maintenance on PCP visit.  Consider sleep studies as outpatient.     Diet    Follow this diet upon discharge: Renal (dialysis)       Discharge Medications   Current Discharge Medication List        START taking these medications    Details   ferrous sulfate (FEROSUL) 325 (65 Fe) MG tablet Take 1 tablet (325 mg) by mouth daily for 180 days  Qty: 180 tablet, Refills: 0    Associated Diagnoses: Anemia due to chronic kidney disease, on chronic dialysis (H)      oxyCODONE (ROXICODONE) 5 MG tablet Take 1 tablet (5 mg) by mouth every 6 hours as needed  Qty: 12 tablet, Refills: 0    Associated Diagnoses: Bleeding pseudoaneurysm of left brachiocephalic AV fistula (H24)           CONTINUE these medications which have NOT CHANGED    Details   acetaminophen (TYLENOL) 325 MG tablet Take 2 tablets (650 mg) by mouth every 8 hours as needed for other (For optimal non-opioid multimodal pain management to improve pain control.)    Associated Diagnoses: Dialysis AV fistula malfunction, sequela      ciprofloxacin (CIPRO) 250 MG tablet Take 1 tablet (250 mg) by mouth every 24 hours for 90 days  Qty: 90 tablet, Refills: 0    Associated Diagnoses: SBP (spontaneous bacterial peritonitis) (H)      ELIQUIS ANTICOAGULANT 5 MG tablet Take 5 mg by mouth 2 times daily      gabapentin (NEURONTIN) 100 MG capsule Take 3 capsules (300 mg) by mouth at bedtime  Qty: 30 capsule, Refills: 2    Associated Diagnoses: Itching      hydrOXYzine (ATARAX) 25 MG tablet Take 1 tablet (25 mg) by mouth 3 times daily as needed for itching  Qty: 90 tablet, Refills: 0    Associated Diagnoses: Restless leg syndrome; Insomnia, unspecified type; Liver transplanted (H); Cirrhosis of liver with ascites, unspecified hepatic cirrhosis type (H)      !! Lacosamide (VIMPAT) 100 MG TABS tablet  Take 1 tablet (100 mg) by mouth every evening  Qty: 31 tablet, Refills: 5    Associated Diagnoses: Seizures (H)      !! lacosamide (VIMPAT) 50 MG TABS tablet Take 1 tablet (50 mg) by mouth every morning  Qty: 93 tablet, Refills: 3    Associated Diagnoses: Focal epilepsy (H)      melatonin 3 MG tablet Take 1 tablet (3 mg) by mouth At Bedtime  Qty: 90 tablet, Refills: 1    Associated Diagnoses: Insomnia, unspecified type      midodrine (PROAMATINE) 10 MG tablet TAKE 1 TABLET 3 X DAILY. ON DIALYSIS DAYS(M, W, F) TAKE 2 EXTRA TABLETS 1 HOUR BEFORE, 1 TABLET WHEN YOU ARRIVE, AND 1 TABLET DURING DIALYSIS  Qty: 450 tablet, Refills: 1    Comments: **Patient requests 90 days supply**  Associated Diagnoses: Hypertension, unspecified type      multivitamin RENAL (TRIPHROCAPS) 1 capsule capsule Take 1 capsule by mouth daily  Qty: 30 capsule, Refills: 3    Associated Diagnoses: ESRD (end stage renal disease) on dialysis (H); Restless leg syndrome      sevelamer carbonate (RENVELA) 800 MG tablet Take 800 mg by mouth 4 times daily With meals and snacks      tacrolimus (GENERIC) 0.5 MG capsule Take 1 capsule (0.5 mg) by mouth 2 times daily for 90 days  Qty: 180 capsule, Refills: 0    Associated Diagnoses: Liver transplanted (H)       !! - Potential duplicate medications found. Please discuss with provider.        Allergies   No Known Allergies    DATA  Most Recent 3 CBC's:  Recent Labs   Lab Test 01/06/24  0915 01/06/24  0003 01/05/24  0824 01/04/24  1145 01/03/24  0733 01/02/24  2327   WBC 5.2  --   --   --  3.8* 4.0   HGB 7.8* 7.7* 7.0*   < > 8.6* 8.7*   MCV 94  --   --   --  95 94   PLT 79*  --   --   --  76* 78*    < > = values in this interval not displayed.      Most Recent 3 BMP's:  Recent Labs   Lab Test 01/06/24  0915 01/06/24  0901 01/03/24  0733 01/02/24  2327   *  --  134* 138   POTASSIUM 5.1  --  4.4 4.5   CHLORIDE 90*  --  91* 93*   CO2 29  --  32* 31*   BUN 55.5*  --  54.6* 47.0*   CR 6.30*  --  6.34* 5.73*    ANIONGAP 8  --  11 14   KELLY 8.8  --  9.7 9.3   * 142* 88 93     Most Recent 2 LFT's:  Recent Labs   Lab Test 01/06/24  0915 01/02/24  2327   AST 23 22   ALT <5 12   ALKPHOS 209* 179*   BILITOTAL 1.2 0.8     Most Recent TSH, T4 and A1c Labs:  Recent Labs   Lab Test 08/10/23  1032 03/25/23  1058   TSH  --  4.89*   T4  --  0.92   A1C 4.7  --      Results for orders placed or performed during the hospital encounter of 01/01/24   US Ext Arterial Venous Dialys Acs Graft    [facility]  01/02/2024    EXAM: ULTRASOUND VENOUS HEMODIALYSIS STUDY LEFT UPPER EXTREMITY DATED    INDICATION:  End stage renal disease.    TECHNIQUE: Ultrasound examination of the upper extremity arteries and veins  was performed, including gray-scale, color, and Doppler waveform analysis.    LEFT UPPER EXTREMITY FINDINGS :    There is a hematoma along the left humerus measuring 20 x 6 x 4 cm likely representing a hematoma. There is some internal flow within the hematoma consistent with a 5.2 x 2.5 x 2.3 cm pseudoaneurysm likely from previous needle access site during   dialysis. Otherwise the left left radial to cephalic AV fistula is patent.      Impression    IMPRESSION:  1. There is a 20 x 6 x 4 cm hematoma within the left distal arm at the level of the distal humerus. There is a pseudoaneurysm measuring 5.2 x 2.5 x 2.3 cm within the hematoma right above the antecubital fossa likely from previous needle access site from   dialysis.   IR CVC Tunnel Placement > 5 Yrs of Age    Narrative    INTERVENTIONAL RADIOLOGY TUNNELED CATHETER PLACEMENT January 3, 2024  at 1203 hours    INDICATION: Chronic intravenous access. Patient has had a recent right  internal jugular tunneled dialysis catheter, recently removed. Patient  has pseudoaneurysm arising from left upper arm fistula requiring  surgical repair. Plan is now for replacement of tunneled catheter with  recovery from surgery to be performed later  today.    Location: Right external jugular vein.    PROCEDURE:   Ultrasound guidance: Ultrasound was used to localize and document  patency of the internal jugular vein. A permanent image of the vein  was recorded.      Maximal Sterile Barrier Technique Utilized: Cap and mask and sterile  gown and sterile gloves and sterile full body drape and hand hygiene  and skin preparation 2% chlorhexidine for cutaneous antisepsis (or  acceptable alternative antiseptics).  Sterile Ultrasound Technique  Utilized ?Sterile gel and sterile probe covers.    Local anesthesia was administered to the skin over the vein and a 4 mm  incision was made. Ultrasound was used to guide placement of a 5F  dilator in the vein using standard technique.      Lidocaine was then administered in the subcutaneous tissue over the  clavicle to a point about 5 cm below the mid clavicle.  A short  incision was made at this point. A 23 cm catheter was then brought  through the tract between the two incisions and inserted into the  jugular vein through a peel away sheath. The catheter was secured in  the subclavian region with two interrupted stitches of 2-0  polypropylene. The neck incision was closed with Dermabond adhesive.      Complications: None.  Sedation: A moderate level of sedation was achieved with 50 mcg of IV  fentanyl.  Sedation time: 16 minutes.  Vital signs and sedation monitored by our nursing staff under my  supervision.   Fluoroscopy time:  0.6 minutes.  Air Kerma: 1.74 mGy.  Contrast given:  None.  Local anesthetic:  20 mL of 1% lidocaine.     FINDINGS:  Fluoroscopic guidance with a permanent image confirmed the  catheter tip location in the right atrium, confirmed with single spot  fluoroscopic image.      Impression    IMPRESSION: Successful placement of right external jugular tunneled  hemodialysis catheter. Patient had recent removal of a right internal  jugular tunneled catheter with hematoma underlying subcutaneous tract.  The new  catheter may be used immediately.    ABEBA SIMONS MD         SYSTEM ID:  [id number]     *Note: Due to a large number of results and/or encounters for the requested time period, some results have not been displayed. A complete set of results can be found in Results Review.

## 2024-01-06 NOTE — PLAN OF CARE
Goal Outcome Evaluation:      Plan of Care Reviewed With: patient, family    Summary: Urgent Hemodialysis, infiltrated and pseudoaneurysm of L AVF   Acute blood loss anemia- received a unit of blood 1/4/24 and 1/5/24  DATE & TIME: 1/5/24 4098-0332  Cognitive Concerns/ Orientation: A&Ox4. Calm, cooperative.   BEHAVIOR & AGGRESSION TOOL COLOR: Green   ABNL VS/O2: Soft BP, other VSS On RA,   MOBILITY: Independent  PAIN MANAGMENT: LUE pain managed with PRN Oxy and scheduled dilaudid.   DIET: Renal diet.   BOWEL/BLADDER: Continent of BM- BM this afternoon 1/5; Pt on HD and runs MWF  ABNL LAB/BG: Hgb 7.0, hematocrit 21.7. Patient received 1 unit of blood recheck at 2000.  DRAIN/DEVICES: R PIV SL. New CVC tunneled cath in R chest- previous hematoma around site marked. LUE wrapped in gauze, with LAZARUS drain in place, draining bright red output.- TELEMETRY RHYTHM: NA  SKIN: L arm +2 to +3 edema, elevated on pillows. NEW Dialysis CVC port on R chest, previous hematoma around on R chest marked. LUE with fistula repair wrapped with gauze and LAZARUS drain -Increased swelling to arm, measuered below elbow 12, bicep 14, and upper arm 13- continued to monitor throughout shift. Increased bruising on anterior side- elevated arm on pillows. Denied numbness and tingling to distal hand and was warm to touch  TESTS/PROCEDURES: HD (M/W/F)  D/C DAY/GOALS/PLACE: Home pending improvement- possibly tomorrow 1/6  OTHER IMPORTANT INFO: Nephrology and Vascular following. Fistula in LUE unable to be used for 4 weeks. New CVC in place for HD.

## 2024-01-06 NOTE — PROGRESS NOTES
VASCULAR SURGERY    Overall doing well.  Left arm still sore as expected.  Dialysis run yesterday.  Tunneled catheter with no issues    Dressings removed left arm.  Sutured areas look fine.  Still swollen as expected.  Excellent pulse within aVF    Drain removed and redressed with Kerlix    AM Hgb= 7.7 (stable-acute blood loss anemia from bleeding fistula)      IMPRESSION: Per vascular standpoint Home today.  Continue using tunneled catheter.  Suture removal in clinic in 3 weeks.  Discharged with pain medications.      Deejay Mcneil MD

## 2024-01-06 NOTE — PROVIDER NOTIFICATION
MD Notification    Notified Person: MD    Notified Person Name:  Karenhuyerinn    Notification Date/Time: 1/6/24 0916    Notification Interaction: Vocera    Purpose of Notification: Pts dressing has bleed through this morning; wondering if you would like me to add pressure and redress?     Orders Received:    Comments: redressed per MD request.

## 2024-01-08 ENCOUNTER — TELEPHONE (OUTPATIENT)
Dept: TRANSPLANT | Facility: CLINIC | Age: 59
End: 2024-01-08
Payer: COMMERCIAL

## 2024-01-08 NOTE — TELEPHONE ENCOUNTER
VM message left reminding patient of upcoming PKE appointments at [facility] on 1/11/24 starting at 0730. Instructed patient may eat breakfast and take regularly scheduled medications.  Contact information given for any further questions/concerns/need to reschedule.

## 2024-01-09 ENCOUNTER — PRE VISIT (OUTPATIENT)
Dept: ORTHOPEDICS | Facility: CLINIC | Age: 59
End: 2024-01-09

## 2024-01-09 ENCOUNTER — TELEPHONE (OUTPATIENT)
Dept: GASTROENTEROLOGY | Facility: CLINIC | Age: 59
End: 2024-01-09

## 2024-01-09 ENCOUNTER — TELEPHONE (OUTPATIENT)
Dept: OTHER | Facility: CLINIC | Age: 59
End: 2024-01-09

## 2024-01-09 ENCOUNTER — OFFICE VISIT (OUTPATIENT)
Dept: ORTHOPEDICS | Facility: CLINIC | Age: 59
End: 2024-01-09
Attending: PHYSICIAN ASSISTANT
Payer: COMMERCIAL

## 2024-01-09 DIAGNOSIS — T82.898A OTHER SPECIFIED COMPLICATION OF VASCULAR PROSTHETIC DEVICES, IMPLANTS AND GRAFTS, INITIAL ENCOUNTER (H): Primary | ICD-10-CM

## 2024-01-09 DIAGNOSIS — B35.1 ONYCHOMYCOSIS: ICD-10-CM

## 2024-01-09 PROCEDURE — 99202 OFFICE O/P NEW SF 15 MIN: CPT | Mod: 25 | Performed by: PODIATRIST

## 2024-01-09 PROCEDURE — 11720 DEBRIDE NAIL 1-5: CPT | Mod: Q8 | Performed by: PODIATRIST

## 2024-01-09 RX ORDER — OXYCODONE HYDROCHLORIDE 5 MG/1
5 TABLET ORAL EVERY 6 HOURS PRN
Qty: 12 TABLET | Refills: 0 | Status: SHIPPED | OUTPATIENT
Start: 2024-01-09 | End: 2024-01-16

## 2024-01-09 NOTE — TELEPHONE ENCOUNTER
[facility]    Who is the name of the provider?:  ELICEO PALUMBO   What is the location you see this provider at/preferred location?: [place]  Person calling / Facility: Blanco Osborne  Phone number:  [phone] (home)  Nurse call back needed:  Yes     Reason for call:  Oxycodone needs to be refilled - the Pharm[facility] on the Bottle is [facility] [address] -     Pharmacy location:     Outside Imaging: n/a   Can we leave a detailed message on this number?  YES     1/9/2024, 10:42 AM

## 2024-01-09 NOTE — TELEPHONE ENCOUNTER
Patient is s/p excision and repair of LEFT brachiocephalic arteriovenous fistula and vein patch angioplasty on 1/3/24 with Dr. Mcneil.    Patient was prescribed oxycodone by Saima on 1/4/24- 12 tablets      Patient is requesting a refill.  Med and pharm loaded- routing to Dr. Tarun ROBISON, RN    [facility]  Office: [phone]  Fax: [phone]

## 2024-01-09 NOTE — PROGRESS NOTES
Date of Service: 1/9/2024    Chief Complaint:   Chief Complaint   Patient presents with    Consult     Onychomycosis.         HPI: Blanco is a 58 year old male who presents today for further evaluation of right hallux onychomycosis. He notes that the nail does cause him pain when in shoes. He has used topical Kerasal with some pain improvement.     Review of Systems: No n/v/df/c/ns/sob/cp    PMH:   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    Cancer (H) 2023    Squamous Cell Cancer- Since resolved    Cirrhosis of liver with ascites (H) 02/11/2016    Coagulopathy (H24)     Critical illness myopathy     Dialysis patient (H24)     DIALYSIS 3X/ WEEK    Embolic stroke (H) 11/20/2022    Encephalopathy     ESRD (end stage renal disease) on dialysis (H)     Gastroesophageal reflux disease     H/O alcohol abuse     History of blood transfusion     Hyperammonemia (H24)     Hyperlipidemia     Hypertension     Patients states that HTN has been resolved    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    Insomnia     Lactic acidosis 11/12/2022    Liver replaced by transplant (H) 09/20/2016    NAFLD (nonalcoholic fatty liver disease) 02/11/2016    CHRISTIAN on CPAP     Parainfluenza type 1 infection 11/19/2022    Parapneumonic effusion     Pleural effusion     Pneumothorax on left 12/16/2022    Respiratory acidosis 11/12/2022    Respiratory arrest (H) 11/12/2022    Restless legs syndrome (RLS)     SBP (spontaneous bacterial peritonitis) (H)     MNGI    Seizures (H) 12/2022    Sepsis due to Streptococcus pneumoniae with acute hypoxic respiratory failure (H) 11/19/2022    Stroke, embolic (H) 11/20/2022    Sudden cardiac arrest (H) 12/29/2022    PEA- 2 min of CPR    Tubular adenoma  10/2019    Large cecal adenoma- due for surveillance colonoscopy in 3 years (10/2022)       PSxH:   Past Surgical History:   Procedure Laterality Date    APPENDECTOMY      BENCH LIVER N/A 09/20/2016    Procedure: BENCH LIVER;  Surgeon: Enoc Crews MD;  Location: [facility] OR    BRONCHOSCOPY FLEXIBLE AND RIGID N/A 12/20/2022    Procedure: BRONCHOSCOPY;  Surgeon: Alena Valenzuela MD;  Location:  GI    COLONOSCOPY  08/06/2013    repeat in 2018    COLONOSCOPY N/A 10/04/2019    Procedure: COLONOSCOPY, WITH POLYPECTOMY AND BIOPSY;  Surgeon: Go Chong MD;  [facility]: [facility]    CREATE FISTULA ARTERIOVENOUS UPPER EXTREMITY Left 03/21/2023    Procedure: LEFT UPPER EXTREMITY ARTERIOVENOUS FISTULA CREATION. LIGATION OF COMPETING VASCULAR BRANCHES- LEFT;  Surgeon: Deejay Mcneil MD;  Location:  OR    CV PERICARDIOCENTESIS N/A 9/29/2023    Procedure: Pericardiocentesis;  Surgeon: Barry Mckeon MD;  Location:  HEART CARDIAC CATH LAB    CV PERICARDIOCENTESIS N/A 10/11/2023    Procedure: Pericardiocentesis;  Surgeon: Ulises Bhakta MD;  Location:  HEART CARDIAC CATH LAB    CV RIGHT HEART CATH MEASUREMENTS RECORDED N/A 10/11/2023    Procedure: Right Heart Catheterization;  Surgeon: Ulises Bahkta MD;  Location:  HEART CARDIAC CATH LAB    HERNIA REPAIR      IR CHEST TUBE PLACEMENT NON-TUNNELED RIGHT  12/12/2022    IR CVC TUNNEL PLACEMENT > 5 YRS OF AGE  12/06/2022    IR CVC TUNNEL PLACEMENT > 5 YRS OF AGE  1/3/2024    IR DIALYSIS FISTULOGRAM LEFT  07/13/2023    IR GASTROSTOMY TUBE CHANGE  02/08/2023    IR GASTROSTOMY TUBE PERCUTANEOUS PLCMNT  11/29/2022    IR PARACENTESIS  11/29/2022    IR PARACENTESIS  12/01/2022    IR PARACENTESIS  2/10/2016    IR PARACENTESIS  2/28/2016    IR THORACENTESIS  12/06/2022    IR THORACENTESIS  09/01/2020    IR THORACENTESIS  08/10/2020    IR TRANSCATHETER BIOPSY  12/01/2022    REVISION FISTULA ARTERIOVENOUS UPPER EXTREMITY Left 8/15/2023     Procedure: 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 2; OUTFLOW REVISION CEPHALIC TO BRACHIAL VEIN;  Surgeon: Deejay Mcneil MD;  Location: [facility]    REVISION FISTULA ARTERIOVENOUS UPPER EXTREMITY Left 1/3/2024    Procedure: Excision and repair of LEFT brachiocephalic arteriovenous fistula and vein patch angioplasty;  Surgeon: Deejay Mcneil MD;  Location: [facility]    TRACHEOSTOMY N/A 2022    Procedure: TRACHEOSTOMY;  Surgeon: Nilesh Jackson MD;  Location: [facility]    TRANSPLANT LIVER RECIPIENT  DONOR N/A 2016    Procedure: TRANSPLANT LIVER RECIPIENT  DONOR;  Surgeon: Enoc Crews MD;  Location: [facility] OR       Allergies: Patient has no known allergies.    SH:   Social History     Socioeconomic History    Marital status:      Spouse name: Not on file    Number of children: Not on file    Years of education: Not on file    Highest education level: Not on file   Occupational History    Not on file   Tobacco Use    Smoking status: Never    Smokeless tobacco: Never   Vaping Use    Vaping Use: Never used   Substance and Sexual Activity    Alcohol use: Not Currently     Alcohol/week: 0.0 standard drinks of alcohol    Drug use: No    Sexual activity: Not Currently   Other Topics Concern    Parent/sibling w/ CABG, MI or angioplasty before 65F 55M? Not Asked   Social History Narrative    Not on file     Social Determinants of Health     Financial Resource Strain: Low Risk  (2023)    Financial Resource Strain     Within the past 12 months, have you or your family members you live with been unable to get utilities (heat, electricity) when it was really needed?: No   Food Insecurity: Low Risk  (2023)    Food Insecurity     Within the past 12 months, did you worry that your food would run out before you got money to buy more?: No     Within the past 12 months, did the food you bought just not last and you didn t have money to get more?: No   Transportation  Needs: Low Risk  (11/2/2023)    Transportation Needs     Within the past 12 months, has lack of transportation kept you from medical appointments, getting your medicines, non-medical meetings or appointments, work, or from getting things that you need?: No   Physical Activity: Not on file   Stress: Not on file   Social Connections: Not on file   Interpersonal Safety: Low Risk  (9/21/2023)    Interpersonal Safety     Do you feel physically and emotionally safe where you currently live?: Yes     Within the past 12 months, have you been hit, slapped, kicked or otherwise physically hurt by someone?: No     Within the past 12 months, have you been humiliated or emotionally abused in other ways by your partner or ex-partner?: No   Housing Stability: Low Risk  (11/2/2023)    Housing Stability     Do you have housing? : Yes     Are you worried about losing your housing?: No       FH:   Family History   Problem Relation Age of Onset    Coronary Artery Disease No family hx of     Cardiomyopathy No family hx of        Objective:  Data Unavailable Data Unavailable Data Unavailable Data Unavailable Data Unavailable 0 lbs 0 oz    PT and DP pulses are 1/4 bilaterally. CRT is instant. Positive pedal hair.   Gross sensation is intact bilaterally.   Equinus is noted bilaterally. No pain with active or passive ROM of the ankle, MTJ, 1st ray, or halluces bilaterally,.   Nail of the right hallux is thickened, brittle, discolored, with subungual debris. No open lesions are noted.     Assessment:   Encounter Diagnoses   Name Primary?    Onychomycosis          Plan:  - Pt seen and evaluated.  - Nail debrided x 1.  - Cont Kerasal.   - Activity as tolerated.  - See again PRN.

## 2024-01-09 NOTE — TELEPHONE ENCOUNTER
Refill request sent to Dr. Mcneil    Please see appt desk.  Patient is already scheduled for post op.      Pippa ROBISON, PETER    [facility]  [facility]  Office: [phone]  Fax: [phone]

## 2024-01-09 NOTE — PROGRESS NOTES
[facility] SOLID ORGAN TRANSPLANT  OUTPATIENT MNT: KIDNEY TRANSPLANT EVALUATION    Current BMI: 27.2 (HT 71.65 in,  lbs/90 kg)  BMI guideline for kidney transplant up to a BMI of 40 / per surgeon discretion     Frailty Assessment-- Not Frail (2/5 points)- low activity level, reduced      Reference:  Score of 0-2 = Not Frail  Score of 3-5 = Frail      TIME SPENT: 30 minutes  VISIT TYPE: Initial   REFERRING PHYSICIAN: LIEN Parson   PT ACCOMPANIED BY: his wife    History of previous txp: liver 2016  Dialysis: HD 11/2022 AM shift     NUTRITION ASSESSMENT  Pt with some inpatient stays the past year. Spring of 2023 he was needing TF (no longer needing). He has also recently been intubated/trached (now removed). He plans to meet with ENT due to voice concerns. He reports if he eats too fast, sometimes feels like food gets stuck in throat. He has done a swallow eval OP after discharge, with no reported diet/texture modifications.     - Appetite: good/baseline   - Food allergies/intolerances: none   - Meal prep & grocery shopping: pt or his wife   - Previous RD education: yes  - Issues chewing or swallowing: see above   - N/V/D/C: no   - Food access concerns: not asked     Vitamins, Supplements, Pertinent Meds: iron, MVI renal, renvela (takes 90% of the time), Natural Calm (mg) for restlessness, Ancient Nutrition Collagen plus powder (mixes into Ensure)  Herbal Medicines/Supplements: turmeric chews, beet chews (takes these occasionally)-- recommend avoiding   Protein Supplement: Gelatein plus (20 g protein)- 1x/day, Ensure Clear (occasional), protein bars (3x/week at dialysis)    Edema: none     Weight hx: stable within the past year    PHYSICAL ACTIVITY   Physical therapy- off/on since hospital stay    Needs help with some ADLs- arm/fistula pain   Was doing weights, etc    DIET RECALL  Not consistent eating due to meds, illness  Yesterday he had:  Gelatein, grapes, pineapple, cheeseburger, protein bar      Little- 1 L FR- 1 Ensure Clear/day, water, 8-16 oz juice/lemonade, 1 pop/week, almond milk in cereal   No alcohol  Dines out for 20% of meals    LABS  1/6 K 5.1 - some highs in Oct  11/1 Phos 4.0     NUTRITION DIAGNOSIS   No nutrition diagnosis identified at this time     NUTRITION INTERVENTION   Nutrition education provided:  Discussed sodium intake (low sodium foods and drinks, seasoning food without salt and tips for low sodium diet).  Reviewed K/Phos levels, protein needs being on dialysis. Pt relies on protein supplements to help achieve protein goals. Reviewed avoiding grapefruit & pomegranate being on Tac. Also reviewed food safety practices, which are life long.     Reviewed post txp diet guidelines in brief (will review in further detail post txp):  (1) Review of proper food safety measures d/t immunosuppressant therapy post-op and increased risk for food-borne illness    (2) Avoid the following post txp d/t risk for rejection, unknown effects on the organs, and/or potential interactions with immunosuppressants:  - Herbal, Chinese, holistic, chiropractic, natural, alternative medicines and supplements  - Detoxes and cleanses  - Weight loss pills  - Protein powders or other products with extracts or herbs (ie green tea extract)    (3) Med regimen and possible side effects    Patient Understanding: Pt verbalized understanding of education provided.  Expected Engagement: Good  Follow-Up Plans: PRN     NUTRITION GOALS   No nutrition goals identified at this time     Gunjan Grullon, RD, LD, CCTD

## 2024-01-09 NOTE — LETTER
1/9/2024         RE: Blanco Osborne  [address]  [facility] [address]        Dear Colleague,    Thank you for referring your patient, Blanco Osborne, to the [facility] [place]. Please see a copy of my visit note below.    Date of Service: 1/9/2024    Chief Complaint:   Chief Complaint   Patient presents with    Consult     Onychomycosis.         HPI: Blanco is a 58 year old male who presents today for further evaluation of right hallux onychomycosis. He notes that the nail does cause him pain when in shoes. He has used topical Kerasal with some pain improvement.     Review of Systems: No n/v/df/c/ns/sob/cp    PMH:   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    Cancer (H) 2023    Squamous Cell Cancer- Since resolved    Cirrhosis of liver with ascites (H) 02/11/2016    Coagulopathy (H24)     Critical illness myopathy     Dialysis patient (H24)     DIALYSIS 3X/ WEEK    Embolic stroke (H) 11/20/2022    Encephalopathy     ESRD (end stage renal disease) on dialysis (H)     Gastroesophageal reflux disease     H/O alcohol abuse     History of blood transfusion     Hyperammonemia (H24)     Hyperlipidemia     Hypertension     Patients states that HTN has been resolved    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    Insomnia     Lactic acidosis 11/12/2022    Liver replaced by transplant (H) 09/20/2016    NAFLD (nonalcoholic fatty liver disease) 02/11/2016    CHRISTIAN on CPAP     Parainfluenza type 1 infection 11/19/2022    Parapneumonic effusion     Pleural effusion     Pneumothorax on left 12/16/2022    Respiratory acidosis 11/12/2022    Respiratory arrest (H) 11/12/2022    Restless legs syndrome (RLS)     SBP (spontaneous  bacterial peritonitis) (H)     MNGI    Seizures (H) 12/2022    Sepsis due to Streptococcus pneumoniae with acute hypoxic respiratory failure (H) 11/19/2022    Stroke, embolic (H) 11/20/2022    Sudden cardiac arrest (H) 12/29/2022    PEA- 2 min of CPR    Tubular adenoma 10/2019    Large cecal adenoma- due for surveillance colonoscopy in 3 years (10/2022)       PSxH:   Past Surgical History:   Procedure Laterality Date    APPENDECTOMY      BENCH LIVER N/A 09/20/2016    Procedure: BENCH LIVER;  Surgeon: Enoc Crews MD;  Location: [facility] OR    BRONCHOSCOPY FLEXIBLE AND RIGID N/A 12/20/2022    Procedure: BRONCHOSCOPY;  Surgeon: Alena Valenzuela MD;  Location:  GI    COLONOSCOPY  08/06/2013    repeat in 2018    COLONOSCOPY N/A 10/04/2019    Procedure: COLONOSCOPY, WITH POLYPECTOMY AND BIOPSY;  Surgeon: Go Chong MD;  [facility]: [facility]    CREATE FISTULA ARTERIOVENOUS UPPER EXTREMITY Left 03/21/2023    Procedure: LEFT UPPER EXTREMITY ARTERIOVENOUS FISTULA CREATION. LIGATION OF COMPETING VASCULAR BRANCHES- LEFT;  Surgeon: Deejay Mcneil MD;  Location:  OR    CV PERICARDIOCENTESIS N/A 9/29/2023    Procedure: Pericardiocentesis;  Surgeon: Barry Mckeon MD;  Location:  HEART CARDIAC CATH LAB    CV PERICARDIOCENTESIS N/A 10/11/2023    Procedure: Pericardiocentesis;  Surgeon: Ulises Bhakta MD;  Location:  HEART CARDIAC CATH LAB    CV RIGHT HEART CATH MEASUREMENTS RECORDED N/A 10/11/2023    Procedure: Right Heart Catheterization;  Surgeon: Ulises Bhakta MD;  Location:  HEART CARDIAC CATH LAB    HERNIA REPAIR      IR CHEST TUBE PLACEMENT NON-TUNNELED RIGHT  12/12/2022    IR CVC TUNNEL PLACEMENT > 5 YRS OF AGE  12/06/2022    IR CVC TUNNEL PLACEMENT > 5 YRS OF AGE  1/3/2024    IR DIALYSIS FISTULOGRAM LEFT  07/13/2023    IR GASTROSTOMY TUBE CHANGE  02/08/2023    IR GASTROSTOMY TUBE PERCUTANEOUS PLCMNT  11/29/2022    IR PARACENTESIS  11/29/2022    IR PARACENTESIS   2022    IR PARACENTESIS  2/10/2016    IR PARACENTESIS  2016    IR THORACENTESIS  2022    IR THORACENTESIS  2020    IR THORACENTESIS  08/10/2020    IR TRANSCATHETER BIOPSY  2022    REVISION FISTULA ARTERIOVENOUS UPPER EXTREMITY Left 8/15/2023    Procedure: 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 2; OUTFLOW REVISION CEPHALIC TO BRACHIAL VEIN;  Surgeon: Deejay Mcneil MD;  Location: [facility] OR    REVISION FISTULA ARTERIOVENOUS UPPER EXTREMITY Left 1/3/2024    Procedure: Excision and repair of LEFT brachiocephalic arteriovenous fistula and vein patch angioplasty;  Surgeon: Deejay Mcneil MD;  Location: [facility] OR    TRACHEOSTOMY N/A 2022    Procedure: TRACHEOSTOMY;  Surgeon: Nilesh Jackson MD;  Location: [facility] OR    TRANSPLANT LIVER RECIPIENT  DONOR N/A 2016    Procedure: TRANSPLANT LIVER RECIPIENT  DONOR;  Surgeon: Enco Crews MD;  Location: [facility] OR       Allergies: Patient has no known allergies.    SH:   Social History     Socioeconomic History    Marital status:      Spouse name: Not on file    Number of children: Not on file    Years of education: Not on file    Highest education level: Not on file   Occupational History    Not on file   Tobacco Use    Smoking status: Never    Smokeless tobacco: Never   Vaping Use    Vaping Use: Never used   Substance and Sexual Activity    Alcohol use: Not Currently     Alcohol/week: 0.0 standard drinks of alcohol    Drug use: No    Sexual activity: Not Currently   Other Topics Concern    Parent/sibling w/ CABG, MI or angioplasty before 65F 55M? Not Asked   Social History Narrative    Not on file     Social Determinants of Health     Financial Resource Strain: Low Risk  (2023)    Financial Resource Strain     Within the past 12 months, have you or your family members you live with been unable to get utilities (heat, electricity) when it was really needed?: No   Food Insecurity: Low Risk   (11/2/2023)    Food Insecurity     Within the past 12 months, did you worry that your food would run out before you got money to buy more?: No     Within the past 12 months, did the food you bought just not last and you didn t have money to get more?: No   Transportation Needs: Low Risk  (11/2/2023)    Transportation Needs     Within the past 12 months, has lack of transportation kept you from medical appointments, getting your medicines, non-medical meetings or appointments, work, or from getting things that you need?: No   Physical Activity: Not on file   Stress: Not on file   Social Connections: Not on file   Interpersonal Safety: Low Risk  (9/21/2023)    Interpersonal Safety     Do you feel physically and emotionally safe where you currently live?: Yes     Within the past 12 months, have you been hit, slapped, kicked or otherwise physically hurt by someone?: No     Within the past 12 months, have you been humiliated or emotionally abused in other ways by your partner or ex-partner?: No   Housing Stability: Low Risk  (11/2/2023)    Housing Stability     Do you have housing? : Yes     Are you worried about losing your housing?: No       FH:   Family History   Problem Relation Age of Onset    Coronary Artery Disease No family hx of     Cardiomyopathy No family hx of        Objective:  Data Unavailable Data Unavailable Data Unavailable Data Unavailable Data Unavailable 0 lbs 0 oz    PT and DP pulses are 1/4 bilaterally. CRT is instant. Positive pedal hair.   Gross sensation is intact bilaterally.   Equinus is noted bilaterally. No pain with active or passive ROM of the ankle, MTJ, 1st ray, or halluces bilaterally,.   Nail of the right hallux is thickened, brittle, discolored, with subungual debris. No open lesions are noted.     Assessment:   Encounter Diagnoses   Name Primary?    Onychomycosis          Plan:  - Pt seen and evaluated.  - Nail debrided x 1.  - Cont Kerasal.   - Activity as tolerated.  - See again  PRN.         Isak Oreilly DPM

## 2024-01-09 NOTE — TELEPHONE ENCOUNTER
Ofe Rowley called back stating that Blanco should be scheduled for a 2 week follow up with Dr Mcneil for stitch removal  after 1/3/2024 surgery -

## 2024-01-10 LAB
ABO/RH(D): NORMAL
ANTIBODY SCREEN: NEGATIVE
SPECIMEN EXPIRATION DATE: NORMAL

## 2024-01-11 ENCOUNTER — OFFICE VISIT (OUTPATIENT)
Dept: TRANSPLANT | Facility: CLINIC | Age: 59
End: 2024-01-11
Attending: NURSE PRACTITIONER
Payer: COMMERCIAL

## 2024-01-11 ENCOUNTER — DOCUMENTATION ONLY (OUTPATIENT)
Dept: TRANSPLANT | Facility: CLINIC | Age: 59
End: 2024-01-11

## 2024-01-11 ENCOUNTER — ANCILLARY PROCEDURE (OUTPATIENT)
Dept: GENERAL RADIOLOGY | Facility: CLINIC | Age: 59
End: 2024-01-11
Attending: NURSE PRACTITIONER
Payer: COMMERCIAL

## 2024-01-11 ENCOUNTER — LAB (OUTPATIENT)
Dept: LAB | Facility: CLINIC | Age: 59
End: 2024-01-11
Attending: NURSE PRACTITIONER
Payer: COMMERCIAL

## 2024-01-11 ENCOUNTER — ANCILLARY PROCEDURE (OUTPATIENT)
Dept: CARDIOLOGY | Facility: CLINIC | Age: 59
End: 2024-01-11
Attending: NURSE PRACTITIONER
Payer: COMMERCIAL

## 2024-01-11 VITALS
SYSTOLIC BLOOD PRESSURE: 112 MMHG | WEIGHT: 198.9 LBS | DIASTOLIC BLOOD PRESSURE: 68 MMHG | BODY MASS INDEX: 26.94 KG/M2 | HEIGHT: 72 IN | HEART RATE: 71 BPM | OXYGEN SATURATION: 98 %

## 2024-01-11 DIAGNOSIS — J98.4 DISEASE OF LUNG: ICD-10-CM

## 2024-01-11 DIAGNOSIS — Z94.4 STATUS POST LIVER TRANSPLANT (H): ICD-10-CM

## 2024-01-11 DIAGNOSIS — K76.89 LIVER DYSFUNCTION: ICD-10-CM

## 2024-01-11 DIAGNOSIS — Z76.82 ORGAN TRANSPLANT CANDIDATE: ICD-10-CM

## 2024-01-11 DIAGNOSIS — Z01.818 PRE-TRANSPLANT EVALUATION FOR KIDNEY TRANSPLANT: ICD-10-CM

## 2024-01-11 DIAGNOSIS — I25.10 CARDIOVASCULAR DISEASE: ICD-10-CM

## 2024-01-11 DIAGNOSIS — Z01.818 PRE-TRANSPLANT EVALUATION FOR KIDNEY TRANSPLANT: Primary | ICD-10-CM

## 2024-01-11 DIAGNOSIS — Z01.818 PRE-TRANSPLANT EVALUATION FOR END STAGE RENAL DISEASE: ICD-10-CM

## 2024-01-11 DIAGNOSIS — Z76.82 ORGAN TRANSPLANT CANDIDATE: Primary | ICD-10-CM

## 2024-01-11 DIAGNOSIS — N18.6 END STAGE RENAL DISEASE (H): ICD-10-CM

## 2024-01-11 DIAGNOSIS — R79.9 ABNORMAL FINDING OF BLOOD CHEMISTRY, UNSPECIFIED: ICD-10-CM

## 2024-01-11 DIAGNOSIS — R79.1 ABNORMAL COAGULATION PROFILE: ICD-10-CM

## 2024-01-11 DIAGNOSIS — Z94.4 LIVER REPLACED BY TRANSPLANT (H): ICD-10-CM

## 2024-01-11 DIAGNOSIS — Z12.5 ENCOUNTER FOR SCREENING FOR MALIGNANT NEOPLASM OF PROSTATE: ICD-10-CM

## 2024-01-11 DIAGNOSIS — Z01.818 PRE-TRANSPLANT EVALUATION FOR END STAGE RENAL DISEASE: Primary | ICD-10-CM

## 2024-01-11 LAB
A1 AB TITR SERPL: >256 {TITER}
ALBUMIN SERPL BCG-MCNC: 4.1 G/DL (ref 3.5–5.2)
ALP SERPL-CCNC: 282 U/L (ref 40–150)
ALT SERPL W P-5'-P-CCNC: <5 U/L (ref 0–70)
ANION GAP SERPL CALCULATED.3IONS-SCNC: 13 MMOL/L (ref 7–15)
ANTIBODY TITER IGM SCREEN: NEGATIVE
APTT PPP: 52 SECONDS (ref 22–38)
AST SERPL W P-5'-P-CCNC: 28 U/L (ref 0–45)
ATRIAL RATE - MUSE: 68 BPM
B IGG TITR SERPL: >256 {TITER}
BASOPHILS # BLD AUTO: 0 10E3/UL (ref 0–0.2)
BASOPHILS NFR BLD AUTO: 1 %
BILIRUB DIRECT SERPL-MCNC: 0.49 MG/DL (ref 0–0.3)
BILIRUB SERPL-MCNC: 1 MG/DL
BUN SERPL-MCNC: 41.2 MG/DL (ref 8–23)
CALCIUM SERPL-MCNC: 9.5 MG/DL (ref 8.6–10)
CHLORIDE SERPL-SCNC: 92 MMOL/L (ref 98–107)
CREAT SERPL-MCNC: 5.25 MG/DL (ref 0.67–1.17)
DEPRECATED HCO3 PLAS-SCNC: 30 MMOL/L (ref 22–29)
DIASTOLIC BLOOD PRESSURE - MUSE: NORMAL MMHG
EGFRCR SERPLBLD CKD-EPI 2021: 12 ML/MIN/1.73M2
EOSINOPHIL # BLD AUTO: 0.3 10E3/UL (ref 0–0.7)
EOSINOPHIL NFR BLD AUTO: 7 %
ERYTHROCYTE [DISTWIDTH] IN BLOOD BY AUTOMATED COUNT: 15.7 % (ref 10–15)
FACTOR 2 INTERPRETATION: NORMAL
FACTOR V INTERPRETATION: NORMAL
GLUCOSE SERPL-MCNC: 128 MG/DL (ref 70–99)
HBA1C MFR BLD: 5 %
HCT VFR BLD AUTO: 27 % (ref 40–53)
HCV AB SERPL QL IA: NONREACTIVE
HGB BLD-MCNC: 8.7 G/DL (ref 13.3–17.7)
HIV 1+2 AB+HIV1 P24 AG SERPL QL IA: NONREACTIVE
IMM GRANULOCYTES # BLD: 0 10E3/UL
IMM GRANULOCYTES NFR BLD: 1 %
INTERPRETATION ECG - MUSE: NORMAL
LAB DIRECTOR COMMENTS: NORMAL
LAB DIRECTOR DISCLAIMER: NORMAL
LAB DIRECTOR INTERPRETATION: NORMAL
LAB DIRECTOR METHODOLOGY: NORMAL
LAB DIRECTOR RESULTS: NORMAL
LVEF ECHO: NORMAL
LYMPHOCYTES # BLD AUTO: 1.1 10E3/UL (ref 0.8–5.3)
LYMPHOCYTES NFR BLD AUTO: 28 %
MCH RBC QN AUTO: 30.9 PG (ref 26.5–33)
MCHC RBC AUTO-ENTMCNC: 32.2 G/DL (ref 31.5–36.5)
MCV RBC AUTO: 96 FL (ref 78–100)
MONOCYTES # BLD AUTO: 0.5 10E3/UL (ref 0–1.3)
MONOCYTES NFR BLD AUTO: 12 %
NEUTROPHILS # BLD AUTO: 2.1 10E3/UL (ref 1.6–8.3)
NEUTROPHILS NFR BLD AUTO: 51 %
NRBC # BLD AUTO: 0 10E3/UL
NRBC BLD AUTO-RTO: 0 /100
P AXIS - MUSE: 56 DEGREES
PLATELET # BLD AUTO: 115 10E3/UL (ref 150–450)
POTASSIUM SERPL-SCNC: 4.2 MMOL/L (ref 3.4–5.3)
PR INTERVAL - MUSE: 176 MS
PROT SERPL-MCNC: 7.9 G/DL (ref 6.4–8.3)
PSA SERPL DL<=0.01 NG/ML-MCNC: 0.28 NG/ML (ref 0–3.5)
QRS DURATION - MUSE: 60 MS
QT - MUSE: 416 MS
QTC - MUSE: 442 MS
R AXIS - MUSE: 6 DEGREES
RBC # BLD AUTO: 2.82 10E6/UL (ref 4.4–5.9)
SODIUM SERPL-SCNC: 135 MMOL/L (ref 135–145)
SPECIMEN DESCRIPTION: NORMAL
SPECIMEN EXPIRATION DATE: NORMAL
SYSTOLIC BLOOD PRESSURE - MUSE: NORMAL MMHG
T AXIS - MUSE: 41 DEGREES
TACROLIMUS BLD-MCNC: 3.4 UG/L (ref 5–15)
TME LAST DOSE: ABNORMAL H
TME LAST DOSE: ABNORMAL H
VENTRICULAR RATE- MUSE: 68 BPM
WBC # BLD AUTO: 4.1 10E3/UL (ref 4–11)

## 2024-01-11 PROCEDURE — 81241 F5 GENE: CPT | Performed by: INTERNAL MEDICINE

## 2024-01-11 PROCEDURE — 93000 ELECTROCARDIOGRAM COMPLETE: CPT | Performed by: INTERNAL MEDICINE

## 2024-01-11 PROCEDURE — 83036 HEMOGLOBIN GLYCOSYLATED A1C: CPT | Performed by: INTERNAL MEDICINE

## 2024-01-11 PROCEDURE — 99417 PROLNG OP E/M EACH 15 MIN: CPT | Performed by: INTERNAL MEDICINE

## 2024-01-11 PROCEDURE — 36415 COLL VENOUS BLD VENIPUNCTURE: CPT | Performed by: PATHOLOGY

## 2024-01-11 PROCEDURE — 86706 HEP B SURFACE ANTIBODY: CPT | Performed by: INTERNAL MEDICINE

## 2024-01-11 PROCEDURE — 86803 HEPATITIS C AB TEST: CPT | Performed by: INTERNAL MEDICINE

## 2024-01-11 PROCEDURE — 99000 SPECIMEN HANDLING OFFICE-LAB: CPT | Performed by: PATHOLOGY

## 2024-01-11 PROCEDURE — 99204 OFFICE O/P NEW MOD 45 MIN: CPT | Performed by: NURSE PRACTITIONER

## 2024-01-11 PROCEDURE — 86900 BLOOD TYPING SEROLOGIC ABO: CPT | Performed by: INTERNAL MEDICINE

## 2024-01-11 PROCEDURE — 82248 BILIRUBIN DIRECT: CPT | Performed by: PATHOLOGY

## 2024-01-11 PROCEDURE — G0463 HOSPITAL OUTPT CLINIC VISIT: HCPCS | Performed by: INTERNAL MEDICINE

## 2024-01-11 PROCEDURE — 84681 ASSAY OF C-PEPTIDE: CPT | Performed by: INTERNAL MEDICINE

## 2024-01-11 PROCEDURE — 81378 HLA I & II TYPING HR: CPT | Performed by: INTERNAL MEDICINE

## 2024-01-11 PROCEDURE — 86704 HEP B CORE ANTIBODY TOTAL: CPT | Performed by: INTERNAL MEDICINE

## 2024-01-11 PROCEDURE — 71046 X-RAY EXAM CHEST 2 VIEWS: CPT | Mod: GC | Performed by: RADIOLOGY

## 2024-01-11 PROCEDURE — 87340 HEPATITIS B SURFACE AG IA: CPT | Performed by: INTERNAL MEDICINE

## 2024-01-11 PROCEDURE — 86644 CMV ANTIBODY: CPT | Performed by: INTERNAL MEDICINE

## 2024-01-11 PROCEDURE — 85025 COMPLETE CBC W/AUTO DIFF WBC: CPT | Performed by: PATHOLOGY

## 2024-01-11 PROCEDURE — 80053 COMPREHEN METABOLIC PANEL: CPT | Performed by: PATHOLOGY

## 2024-01-11 PROCEDURE — 86665 EPSTEIN-BARR CAPSID VCA: CPT | Performed by: INTERNAL MEDICINE

## 2024-01-11 PROCEDURE — 86833 HLA CLASS II HIGH DEFIN QUAL: CPT | Performed by: INTERNAL MEDICINE

## 2024-01-11 PROCEDURE — 99207 PR SPORTS PHYSICAL NO CHARGE: CPT

## 2024-01-11 PROCEDURE — 87799 DETECT AGENT NOS DNA QUANT: CPT | Performed by: INTERNAL MEDICINE

## 2024-01-11 PROCEDURE — 99215 OFFICE O/P EST HI 40 MIN: CPT | Performed by: INTERNAL MEDICINE

## 2024-01-11 PROCEDURE — 86707 HEPATITIS BE ANTIBODY: CPT | Mod: 90 | Performed by: PATHOLOGY

## 2024-01-11 PROCEDURE — 86832 HLA CLASS I HIGH DEFIN QUAL: CPT | Performed by: INTERNAL MEDICINE

## 2024-01-11 PROCEDURE — 86147 CARDIOLIPIN ANTIBODY EA IG: CPT | Performed by: INTERNAL MEDICINE

## 2024-01-11 PROCEDURE — 93306 TTE W/DOPPLER COMPLETE: CPT | Performed by: INTERNAL MEDICINE

## 2024-01-11 PROCEDURE — 85730 THROMBOPLASTIN TIME PARTIAL: CPT | Performed by: PATHOLOGY

## 2024-01-11 PROCEDURE — 86645 CMV ANTIBODY IGM: CPT | Performed by: INTERNAL MEDICINE

## 2024-01-11 PROCEDURE — 86481 TB AG RESPONSE T-CELL SUSP: CPT | Performed by: INTERNAL MEDICINE

## 2024-01-11 PROCEDURE — 80197 ASSAY OF TACROLIMUS: CPT | Performed by: INTERNAL MEDICINE

## 2024-01-11 PROCEDURE — G0103 PSA SCREENING: HCPCS | Performed by: PATHOLOGY

## 2024-01-11 PROCEDURE — 86886 COOMBS TEST INDIRECT TITER: CPT | Performed by: INTERNAL MEDICINE

## 2024-01-11 PROCEDURE — G0452 MOLECULAR PATHOLOGY INTERPR: HCPCS | Mod: 26 | Performed by: PATHOLOGY

## 2024-01-11 NOTE — TELEPHONE ENCOUNTER
[facility]    Who is the name of the provider?:  ELICEO PALUMBO   What is the location you see this provider at/preferred location?: [place]  Person calling / Facility: Blanco Osborne  Phone number:  [phone] (home)  Nurse call back needed:  Yes     Reason for call:  Ofe is calling in for Blanco - Blanco is asking if he can get more oxycodone - we just did a refill request on the 9th-    Pharmacy location:     Outside Imaging: n/a   Can we leave a detailed message on this number?  YES     1/11/2024, 1:35 PM

## 2024-01-11 NOTE — LETTER
1/11/2024         RE: Blanco Osborne  [address]  [place] [address]        Dear Colleague,    Thank you for referring your patient, Blanco Osborne, to the [facility]PL[facility]. Please see a copy of my visit note below.    TRANSPLANT NEPHROLOGY RECIPIENT EVALUATION NOTE    Assessment and Plan:  # Kidney Transplant Evaluation: Patient is a good candidate overall. Benefits of a living donor transplant were discussed.    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 At that time his creatinine was up to 5. During that hospitalization was initiated on dialysis for acute renal failure and never recovered. His hospitalization was complicated with stroke (per discharge summary, no CT scans found) but no residual weakness noted.   He had tracheotomy for prolonged respiratory failure, had PEG tube which were removed in mid 2023 and since his breathing and weights been stable respectively.  Also had recurrent plural effusion with chest tube now removed   H/o pericardial effusion as well s/p pericardiocentesis x 2 in September and October 2023.    # Cardiac Risk: moderate risk with h/o PEA arrest in 2022 and his age. Likely need angiogram.     # PAD Screening: Will obtain updated imaging for Transplant Surgery to review    # CARRILLO s/p liver transplant in 2016 : Currently on tacrolimus only. Pt does not recall any side effects with MMF in the past. Have had ascites and SBP back in October 2023 during his hospitalization. Now on chronic prophylaxis with ciprofloxacin. No recurrence of his ascites/infection after that. Last visit with hepatology was in dec 2023 which stated his liver function is stable and no evidence of cirrhosis at that point.     # PEA arrest after acute hypoxic  respiratory failure in November 2022: per chart review had out side hospital cardiac arrest with bystander CPR for 20 min. He again had PEA x 2 in ER but was able to achieve ROSC quickly.    # Afib: seems like paroxymal during stressful events. Not on rate or rhythm control agents but anticoagulated with Eliqus 5 mg BID.    # squamous cell cancer June 15th 2023 : one lesion on arm likely after trauma. Removed with good edges, need to find those report    # Seizure during his hospitalization, never had seizures before or after his hospitalization. Reportedly noted while he was in hospitalized and started on antiepileptics. Working with neurology to wean off of lacosamide, currently taking once a day at night.    # Health Maintenance: Colonoscopy: Not up to date, last one was in August 2013 and needed to repeat in 5 years but not done yet Dermatology: Not up to date,need skin eval with h/o squamous cell and Dental: Up to date, mid last year had cleaning and a crown.     Discussed the risks and benefits of a transplant, including the risk of surgery and immunosuppression medications.  Patient's overall evaluation will be discussed in the Transplant Program's regular meeting with a final recommendation on the patients suitability for transplant to be made at that time.    Recommendations   Need cardiac angiogram with h/o PEA arrest   Need image of his abdominal vessels (either CT or US, of surgeon choice)  Need colonoscopy  Need dermatology eval with h/o SCC lesion     Evaluation:  Blanco Osborne was seen in consultation at the request of Dr. Meche Chaves for evaluation as a potential kidney transplant recipient.    Reason for Visit:  Blanco Osborne is a 58 year old male with ESKD from unknown etiology, who presents for kidney transplant evaluation.    History of Present Illness:  Pt presented to Nephrology clinic for pre transplant evaluation. He presented iwht his wife. Pt endorsed he is feeling farily well.  Tolerating HD well with midodrine 10 mg only with dialysis. He had significnat events happened in November 2022 and followed into 2023. Since late June/July he is in steady state. Did had few admission again since then but discharged fairly quickly.  Mentioned he underwent PT after his hospitalization after 2022 and now he is almost back to his baseline.  Recently had fistula problems including aneurysm repair. After he had fistula infiltrated, currently have right perm cath to give time for fistula to heal.    No h/o CHRISTIAN, but was on CPAP/BIPAP during hospitalization, planned Sleep evalution in feb.          Kidney Disease Hx:        Kidney Disease Dx: Unknown etiology likely 2/2 PEA arrest.       Biopsy Proven: No         On Dialysis: Yes, Date initiated: 2022, Dialysis Type: [facility];, and Dialysis unit: [facility]        Primary Nephrologist: Dr. Bradshaw        H/o Kidney Stones: No       H/o Recurrent/Frequent UTI: No         Diabetic Hx: None           Cardiac/Vascular Disease Risk Factors:        Cardiac Risk Factors: CKD and Age (Male > 55, Female > 65)       Known CAD: No       Known PAD/Caludication Symptoms: No       Known Heart Failure: No       Arrhythmia: Yes; paroxymal afib       Pulmonary Hypertension: No       Valvular Disease: Yes; mild aortic insufficiency       Other: Hypotension on midodrine         Viral Serology Status       CMV IgG Antibody: Unknown       EBV IgG Antibody: Unknown         Volume Status/Weight:        Volume status: Euvolemic       Weight:  Acceptable BMI       BMI: Body mass index is 27.24 kg/m .         Functional Capacity/Frailty:        Good     Fatigue/Decreased Energy: [x] No [] Yes    Chest Pain or SOB with Exertion: [x] No [] Yes    Significant Weight Change: [x] No [] Yes    Nausea, Vomiting or Diarrhea: [x] No [] Yes    Fever, Sweats or Chills:  [x] No [] Yes    Leg Swelling [x] No [] Yes        History of Cancer: None    Other Significant Medical Issues:  None    Possible Higher Risk Donor Option Preferences:   Donor with a high Kidney Donor Profile Index (KDPI) score: Yes   Donor with positive Hepatitis C status: Not discussed   Donor with positive Hepatitis B status:  Not sure   Donor with positive HIV status: Not discussed   Donor with blood type A2B: Not discussed     Allergy Testing Questions:   Medication that caused a reaction None     Antibiotics used that didn't give an allergic reaction?  Patient doesn't know    COVID Vaccination Up To Date: Yes last dose late last year    Potential Living Kidney Donors: Yes need to be evaluted    Review of Systems:  A comprehensive review of systems was obtained and negative, except as noted in the HPI or PMH.    Past Medical History:   Medical record was reviewed and PMH was discussed with patient and noted below.  Past Medical History:   Diagnosis Date     Acquired immunocompromised state (H24) 11/19/2022     Acute renal failure, unspecified acute renal failure type (H24) 11/12/2022     Antiplatelet or antithrombotic long-term use      Arrhythmia     PEA     Ascites      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     Cancer (H) 2023    Squamous Cell Cancer- Since resolved     Cirrhosis of liver with ascites (H) 02/11/2016     Coagulopathy (H24)      Critical illness myopathy      Dialysis patient (H24)     DIALYSIS 3X/ WEEK     Embolic stroke (H) 11/20/2022     Encephalopathy      ESRD (end stage renal disease) on dialysis (H)      Gastroesophageal reflux disease      H/O alcohol abuse      History of blood transfusion      Hyperammonemia (H24)      Hyperlipidemia      Hypertension     Patients states that HTN has been resolved     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     Insomnia      Lactic acidosis 11/12/2022     Liver replaced by transplant (H) 09/20/2016     NAFLD (nonalcoholic fatty liver disease) 02/11/2016     CHRISTIAN on CPAP      Parainfluenza type 1 infection 11/19/2022     Parapneumonic effusion       Pleural effusion      Pneumothorax on left 12/16/2022     Respiratory acidosis 11/12/2022     Respiratory arrest (H) 11/12/2022     Restless legs syndrome (RLS)      SBP (spontaneous bacterial peritonitis) (H)     MNGI     Seizures (H) 12/2022     Sepsis due to Streptococcus pneumoniae with acute hypoxic respiratory failure (H) 11/19/2022     Stroke, embolic (H) 11/20/2022     Sudden cardiac arrest (H) 12/29/2022    PEA- 2 min of CPR     Tubular adenoma 10/2019    Large cecal adenoma- due for surveillance colonoscopy in 3 years (10/2022)       Past Social History:   Past Surgical History:   Procedure Laterality Date     APPENDECTOMY       BENCH LIVER N/A 09/20/2016    Procedure: BENCH LIVER;  Surgeon: Enoc Crews MD;  Location: [facility] OR     BRONCHOSCOPY FLEXIBLE AND RIGID N/A 12/20/2022    Procedure: BRONCHOSCOPY;  Surgeon: Alena Valenzuela MD;  Location:  GI     COLONOSCOPY  08/06/2013    repeat in 2018     COLONOSCOPY N/A 10/04/2019    Procedure: COLONOSCOPY, WITH POLYPECTOMY AND BIOPSY;  Surgeon: Go Chong MD;  Location: [facility] OR     CREATE FISTULA ARTERIOVENOUS UPPER EXTREMITY Left 03/21/2023    Procedure: LEFT UPPER EXTREMITY ARTERIOVENOUS FISTULA CREATION. LIGATION OF COMPETING VASCULAR BRANCHES- LEFT;  Surgeon: Deejay Mcneil MD;  Location:  OR     CV PERICARDIOCENTESIS N/A 9/29/2023    Procedure: Pericardiocentesis;  Surgeon: Barry Mckeon MD;  Location:  HEART CARDIAC CATH LAB     CV PERICARDIOCENTESIS N/A 10/11/2023    Procedure: Pericardiocentesis;  Surgeon: Ulises Bhakta MD;  Location:  HEART CARDIAC CATH LAB     CV RIGHT HEART CATH MEASUREMENTS RECORDED N/A 10/11/2023    Procedure: Right Heart Catheterization;  Surgeon: Ulises Bhakta MD;  Location:  HEART CARDIAC CATH LAB     HERNIA REPAIR       IR CHEST TUBE PLACEMENT NON-TUNNELED RIGHT  12/12/2022     IR CVC TUNNEL PLACEMENT > 5 YRS OF AGE  12/06/2022     IR CVC TUNNEL PLACEMENT > 5 YRS  OF AGE  1/3/2024     IR DIALYSIS FISTULOGRAM LEFT  2023     IR GASTROSTOMY TUBE CHANGE  2023     IR GASTROSTOMY TUBE PERCUTANEOUS PLCMNT  2022     IR PARACENTESIS  2022     IR PARACENTESIS  2022     IR PARACENTESIS  2/10/2016     IR PARACENTESIS  2016     IR THORACENTESIS  2022     IR THORACENTESIS  2020     IR THORACENTESIS  08/10/2020     IR TRANSCATHETER BIOPSY  2022     REVISION FISTULA ARTERIOVENOUS UPPER EXTREMITY Left 8/15/2023    Procedure: 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 2; OUTFLOW REVISION CEPHALIC TO BRACHIAL VEIN;  Surgeon: Deejay Mcneil MD;  Location: [facility] OR     REVISION FISTULA ARTERIOVENOUS UPPER EXTREMITY Left 1/3/2024    Procedure: Excision and repair of LEFT brachiocephalic arteriovenous fistula and vein patch angioplasty;  Surgeon: Deejay Mcneil MD;  Location: [facility] OR     TRACHEOSTOMY N/A 2022    Procedure: TRACHEOSTOMY;  Surgeon: Nilesh Jackson MD;  Location: [facility] OR     TRANSPLANT LIVER RECIPIENT  DONOR N/A 2016    Procedure: TRANSPLANT LIVER RECIPIENT  DONOR;  Surgeon: Enoc Crews MD;  Location: [facility] OR     Personal history of bleeding or anesthesia problems: No    Family History:  Family History   Problem Relation Age of Onset     Coronary Artery Disease No family hx of      Cardiomyopathy No family hx of        Personal History:   Social History     Socioeconomic History     Marital status:      Spouse name: Not on file     Number of children: Not on file     Years of education: Not on file     Highest education level: Not on file   Occupational History     Not on file   Tobacco Use     Smoking status: Never     Smokeless tobacco: Never   Vaping Use     Vaping Use: Never used   Substance and Sexual Activity     Alcohol use: Not Currently     Alcohol/week: 0.0 standard drinks of alcohol     Drug use: No     Sexual activity: Not Currently   Other Topics Concern      Parent/sibling w/ CABG, MI or angioplasty before 65F 55M? Not Asked   Social History Narrative     Not on file     Social Determinants of Health     Financial Resource Strain: Low Risk  (11/2/2023)    Financial Resource Strain      Within the past 12 months, have you or your family members you live with been unable to get utilities (heat, electricity) when it was really needed?: No   Food Insecurity: Low Risk  (11/2/2023)    Food Insecurity      Within the past 12 months, did you worry that your food would run out before you got money to buy more?: No      Within the past 12 months, did the food you bought just not last and you didn t have money to get more?: No   Transportation Needs: Low Risk  (11/2/2023)    Transportation Needs      Within the past 12 months, has lack of transportation kept you from medical appointments, getting your medicines, non-medical meetings or appointments, work, or from getting things that you need?: No   Physical Activity: Not on file   Stress: Not on file   Social Connections: Not on file   Interpersonal Safety: Low Risk  (9/21/2023)    Interpersonal Safety      Do you feel physically and emotionally safe where you currently live?: Yes      Within the past 12 months, have you been hit, slapped, kicked or otherwise physically hurt by someone?: No      Within the past 12 months, have you been humiliated or emotionally abused in other ways by your partner or ex-partner?: No   Housing Stability: Low Risk  (11/2/2023)    Housing Stability      Do you have housing? : Yes      Are you worried about losing your housing?: No       Allergies:  No Known Allergies    Medications:  Current Outpatient Medications   Medication Sig     acetaminophen (TYLENOL) 325 MG tablet Take 2 tablets (650 mg) by mouth every 8 hours as needed for other (For optimal non-opioid multimodal pain management to improve pain control.)     ciprofloxacin (CIPRO) 250 MG tablet Take 1 tablet (250 mg) by mouth every 24  "hours for 90 days     ELIQUIS ANTICOAGULANT 5 MG tablet Take 5 mg by mouth 2 times daily     ferrous sulfate (FEROSUL) 325 (65 Fe) MG tablet Take 1 tablet (325 mg) by mouth daily for 180 days     gabapentin (NEURONTIN) 100 MG capsule Take 3 capsules (300 mg) by mouth at bedtime     hydrOXYzine (ATARAX) 25 MG tablet Take 1 tablet (25 mg) by mouth 3 times daily as needed for itching     Lacosamide (VIMPAT) 100 MG TABS tablet Take 1 tablet (100 mg) by mouth every evening     lacosamide (VIMPAT) 50 MG TABS tablet Take 1 tablet (50 mg) by mouth every morning     melatonin 3 MG tablet Take 1 tablet (3 mg) by mouth At Bedtime (Patient taking differently: Take 3 mg by mouth nightly as needed)     midodrine (PROAMATINE) 10 MG tablet TAKE 1 TABLET 3 X DAILY. ON DIALYSIS DAYS(M, W, F) TAKE 2 EXTRA TABLETS 1 HOUR BEFORE, 1 TABLET WHEN YOU ARRIVE, AND 1 TABLET DURING DIALYSIS     multivitamin RENAL (TRIPHROCAPS) 1 capsule capsule Take 1 capsule by mouth daily     oxyCODONE (ROXICODONE) 5 MG tablet Take 1 tablet (5 mg) by mouth every 6 hours as needed for severe pain     sevelamer carbonate (RENVELA) 800 MG tablet Take 800 mg by mouth 4 times daily With meals and snacks     tacrolimus (GENERIC) 0.5 MG capsule Take 1 capsule (0.5 mg) by mouth 2 times daily for 90 days     No current facility-administered medications for this visit.       Vitals:  /68   Pulse 71   Ht 1.82 m (5' 11.65\")   Wt 90.2 kg (198 lb 14.4 oz)   SpO2 98%   BMI 27.24 kg/m      Exam:  GENERAL APPEARANCE: alert and no distress  EYES: eyes grossly normal to inspection  HENT: mouth without ulcers or lesions  RESP: lungs clear to auscultation - no rales, rhonchi or wheezes  CV: regular rhythm, normal rate, no murmur appreciated  EDEMA: no LE edema bilaterally  ABDOMEN: soft, nondistended, non tender  MS: extremities normal - no gross deformities noted, no evidence of inflammation in joints, no muscle tenderness  SKIN: no rash  NEURO: normal strength and " tone, sensory exam grossly normal, mentation intact and speech normal  PSYCH: mentation appears normal and affect normal/bright  DIALYSIS ACCESS:  LUE AV fistula with good thrill but undergoing HD through right perm cath  He recently had fistula infiltration, so had perm cath.    Results:   Recent Results (from the past 336 hour(s))   Basic metabolic panel    Collection Time: 12/31/23  1:49 AM   Result Value Ref Range    Sodium 136 135 - 145 mmol/L    Potassium 4.2 3.4 - 5.3 mmol/L    Chloride 90 (L) 98 - 107 mmol/L    Carbon Dioxide (CO2) 25 22 - 29 mmol/L    Anion Gap 21 (H) 7 - 15 mmol/L    Urea Nitrogen 100.4 (H) 8.0 - 23.0 mg/dL    Creatinine 10.60 (H) 0.67 - 1.17 mg/dL    GFR Estimate 5 (L) >60 mL/min/1.73m2    Calcium 9.3 8.6 - 10.0 mg/dL    Glucose 105 (H) 70 - 99 mg/dL   Extra Purple Top Tube    Collection Time: 12/31/23  1:49 AM   Result Value Ref Range    Hold Specimen JIC    EKG 12 lead    Collection Time: 12/31/23  2:40 AM   Result Value Ref Range    Systolic Blood Pressure  mmHg    Diastolic Blood Pressure  mmHg    Ventricular Rate 68 BPM    Atrial Rate 68 BPM    MS Interval 202 ms    QRS Duration 90 ms     ms    QTc 463 ms    P Axis 55 degrees    R AXIS 28 degrees    T Axis 36 degrees    Interpretation ECG       Sinus rhythm  Normal ECG  When compared with ECG of 30-OCT-2023 06:41,  Minimal criteria for Anterior infarct are no longer Present  T wave amplitude has increased in Anterior leads  QT has lengthened  Confirmed by GENERATED REPORT, COMPUTER ([id number]),  Aasen, Bradley ([id number]) on 12/31/2023 3:52:32 AM     EKG 12-lead, tracing only    Collection Time: 01/01/24  8:07 AM   Result Value Ref Range    Systolic Blood Pressure  mmHg    Diastolic Blood Pressure  mmHg    Ventricular Rate 64 BPM    Atrial Rate 64 BPM    MS Interval 198 ms    QRS Duration 88 ms     ms    QTc 466 ms    P Axis 50 degrees    R AXIS 46 degrees    T Axis 21 degrees    Interpretation ECG       Sinus rhythm  Low  voltage QRS  Cannot rule out Anterior infarct , age undetermined  Abnormal ECG  When compared with ECG of 31-DEC-2023 02:40,  No significant change was found  Confirmed by GENERATED REPORT, COMPUTER ([id number]),  Chelsea Chand ([id number]) on 1/1/2024 8:29:29 AM     Comprehensive metabolic panel    Collection Time: 01/01/24  8:13 AM   Result Value Ref Range    Sodium 135 135 - 145 mmol/L    Potassium 4.7 3.4 - 5.3 mmol/L    Carbon Dioxide (CO2) 24 22 - 29 mmol/L    Anion Gap 20 (H) 7 - 15 mmol/L    Urea Nitrogen 124.5 (H) 8.0 - 23.0 mg/dL    Creatinine 11.96 (H) 0.67 - 1.17 mg/dL    GFR Estimate 4 (L) >60 mL/min/1.73m2    Calcium 9.1 8.6 - 10.0 mg/dL    Chloride 91 (L) 98 - 107 mmol/L    Glucose 90 70 - 99 mg/dL    Alkaline Phosphatase 178 (H) 40 - 150 U/L    AST 17 0 - 45 U/L    ALT 10 0 - 70 U/L    Protein Total 7.4 6.4 - 8.3 g/dL    Albumin 4.0 3.5 - 5.2 g/dL    Bilirubin Total 0.4 <=1.2 mg/dL   Blood Culture Peripheral Blood    Collection Time: 01/01/24  8:13 AM    Specimen: Peripheral Blood   Result Value Ref Range    Culture No Growth    Blood Culture Peripheral Blood    Collection Time: 01/01/24  8:13 AM    Specimen: Peripheral Blood   Result Value Ref Range    Culture No Growth    CBC with platelets and differential    Collection Time: 01/01/24  8:13 AM   Result Value Ref Range    WBC Count 3.5 (L) 4.0 - 11.0 10e3/uL    RBC Count 2.86 (L) 4.40 - 5.90 10e6/uL    Hemoglobin 8.6 (L) 13.3 - 17.7 g/dL    Hematocrit 26.4 (L) 40.0 - 53.0 %    MCV 92 78 - 100 fL    MCH 30.1 26.5 - 33.0 pg    MCHC 32.6 31.5 - 36.5 g/dL    RDW 14.6 10.0 - 15.0 %    Platelet Count 63 (L) 150 - 450 10e3/uL    % Neutrophils 52 %    % Lymphocytes 27 %    % Monocytes 14 %    % Eosinophils 6 %    % Basophils 1 %    % Immature Granulocytes 0 %    NRBCs per 100 WBC 0 <1 /100    Absolute Neutrophils 1.9 1.6 - 8.3 10e3/uL    Absolute Lymphocytes 1.0 0.8 - 5.3 10e3/uL    Absolute Monocytes 0.5 0.0 - 1.3 10e3/uL    Absolute Eosinophils 0.2 0.0 -  0.7 10e3/uL    Absolute Basophils 0.0 0.0 - 0.2 10e3/uL    Absolute Immature Granulocytes 0.0 <=0.4 10e3/uL    Absolute NRBCs 0.0 10e3/uL   Vitamin B12    Collection Time: 01/02/24 11:27 PM   Result Value Ref Range    Vitamin B12 948 232 - 1,245 pg/mL   Folate    Collection Time: 01/02/24 11:27 PM   Result Value Ref Range    Folic Acid 27.6 4.6 - 34.8 ng/mL   Comprehensive metabolic panel    Collection Time: 01/02/24 11:27 PM   Result Value Ref Range    Sodium 138 135 - 145 mmol/L    Potassium 4.5 3.4 - 5.3 mmol/L    Carbon Dioxide (CO2) 31 (H) 22 - 29 mmol/L    Anion Gap 14 7 - 15 mmol/L    Urea Nitrogen 47.0 (H) 8.0 - 23.0 mg/dL    Creatinine 5.73 (H) 0.67 - 1.17 mg/dL    GFR Estimate 11 (L) >60 mL/min/1.73m2    Calcium 9.3 8.6 - 10.0 mg/dL    Chloride 93 (L) 98 - 107 mmol/L    Glucose 93 70 - 99 mg/dL    Alkaline Phosphatase 179 (H) 40 - 150 U/L    AST 22 0 - 45 U/L    ALT 12 0 - 70 U/L    Protein Total 7.5 6.4 - 8.3 g/dL    Albumin 4.2 3.5 - 5.2 g/dL    Bilirubin Total 0.8 <=1.2 mg/dL   Bld morphology pathology review    Collection Time: 01/02/24 11:27 PM   Result Value Ref Range    Final Diagnosis       Peripheral blood, morphology:  - Marked anemia, normocytic and normochromic.  - Moderate thrombocytopenia; no abnormal platelet clumping seen on the slide preparations.  - WBC subsets quantitatively within normal limits and without diagnostic morphologic abnormality.  - Negative for schistocytes and definitive morphologic features of hemolysis.   - Negative for overt features of myelodysplasia and circulating blasts.    See comment.    COMMENT:  Normocytic anemia is nonspecific and may be attributable to multiple overlapping etiologies, including chronic disease, renal and/or endocrine disease, blood loss, iron deficiency (in some patients), and/or bone marrow suppression (by underlying infection, various nutritional deficiencies, toxicities, alcohol use, or medications).    Thrombocytopenia is nonspecific and  possible etiologies may include immune-associated platelet destruction (such as idiopathic thrombocytopenic purpura [ITP], heparin-induced thrombocytopenia [HIT], post-transfusion purpura, or drug-induced antibodies), non-immune-associated platelet destruction (such as recent thrombosis or thrombotic microangiopathies), decreased platelet production (such as vitamin B12 or folate deficiency, underlying infection, myelodysplasia and other hematologic malignancies, aplastic anemia, or bone marrow suppression [by alcohol, medications, chemotherapy, radiation, or marrow infiltrative processes]), medications (including immunosuppressive agents), splenic sequestration of platelets, or liver disease.  Clinical correlation is recommended.      Peripheral Smear       A microscopic examination is performed.       Peripheral Hematologic Data        Latest Reference Range & Units 01/02/24 23:27   WBC 4.0 - 11.0 10e3/uL 4.0   Hemoglobin 13.3 - 17.7 g/dL 8.7 (L)   Hematocrit 40.0 - 53.0 % 26.6 (L)   Platelet Count 150 - 450 10e3/uL 78 (L)   RBC Count 4.40 - 5.90 10e6/uL 2.83 (L)   MCV 78 - 100 fL 94   MCH 26.5 - 33.0 pg 30.7   MCHC 31.5 - 36.5 g/dL 32.7   RDW 10.0 - 15.0 % 14.7   % Neutrophils % 43   % Lymphocytes % 32   % Monocytes % 17   % Eosinophils % 7   % Basophils % 1   Absolute Basophils 0.0 - 0.2 10e3/uL 0.0   Absolute Eosinophils 0.0 - 0.7 10e3/uL 0.3   Absolute Immature Granulocytes <=0.4 10e3/uL 0.0   Absolute Lymphocytes 0.8 - 5.3 10e3/uL 1.3   Absolute Monocytes 0.0 - 1.3 10e3/uL 0.7   % Immature Granulocytes % 0   Absolute Neutrophils 1.6 - 8.3 10e3/uL 1.8   Absolute NRBCs 10e3/uL 0.0   NRBCs per 100 WBC <1 /100 0   % Retic 0.5 - 2.0 % 1.2   Absolute Retic 0.025 - 0.095 10e6/uL 0.035   (L): Data is abnormally low    For patients over 18 years old, anemia and quantitative abnormalities of WBC and platelets (if present) may be further stratified as follows:    WBC (10^9/L):    Greater than 50.0: Marked leukocytosis     18.0 - 50.0: Moderate leukocytosis    11.1 - 17.9: Mild leukocytosis    3.0 - 3.9: Mild leukopenia    2.0 - 2.9: Moderate leukopenia    Less than 2.0: Marked leukopenia    Hemoglobin (g/dL) in males:    11.3 - 13.2: Mild anemia    9.3 - 11.2: Moderate anemia    Less than 9.3: Marked anemia    Platelets (10^9/L):    Greater than 900: Marked thrombocytosis    601 - 900: Moderate thrombocytosis    451 - 600: Mild thrombocytosis    100 - 149: Mild thrombocytopenia    50 - 99: Moderate thrombocytopenia    Less than 50: Marked thrombocytopenia       Performing Labs       The technical component of this testing was completed at [facility], [facility] and [facility]     CBC with platelets and differential    Collection Time: 01/02/24 11:27 PM   Result Value Ref Range    WBC Count 4.0 4.0 - 11.0 10e3/uL    RBC Count 2.83 (L) 4.40 - 5.90 10e6/uL    Hemoglobin 8.7 (L) 13.3 - 17.7 g/dL    Hematocrit 26.6 (L) 40.0 - 53.0 %    MCV 94 78 - 100 fL    MCH 30.7 26.5 - 33.0 pg    MCHC 32.7 31.5 - 36.5 g/dL    RDW 14.7 10.0 - 15.0 %    Platelet Count 78 (L) 150 - 450 10e3/uL    % Neutrophils 43 %    % Lymphocytes 32 %    % Monocytes 17 %    % Eosinophils 7 %    % Basophils 1 %    % Immature Granulocytes 0 %    NRBCs per 100 WBC 0 <1 /100    Absolute Neutrophils 1.8 1.6 - 8.3 10e3/uL    Absolute Lymphocytes 1.3 0.8 - 5.3 10e3/uL    Absolute Monocytes 0.7 0.0 - 1.3 10e3/uL    Absolute Eosinophils 0.3 0.0 - 0.7 10e3/uL    Absolute Basophils 0.0 0.0 - 0.2 10e3/uL    Absolute Immature Granulocytes 0.0 <=0.4 10e3/uL    Absolute NRBCs 0.0 10e3/uL   Reticulocyte count    Collection Time: 01/02/24 11:27 PM   Result Value Ref Range    % Reticulocyte 1.2 0.5 - 2.0 %    Absolute Reticulocyte 0.035 0.025 - 0.095 10e6/uL   Basic metabolic panel    Collection Time: 01/03/24  7:33 AM   Result Value Ref Range    Sodium 134 (L) 135 - 145 mmol/L    Potassium  4.4 3.4 - 5.3 mmol/L    Chloride 91 (L) 98 - 107 mmol/L    Carbon Dioxide (CO2) 32 (H) 22 - 29 mmol/L    Anion Gap 11 7 - 15 mmol/L    Urea Nitrogen 54.6 (H) 8.0 - 23.0 mg/dL    Creatinine 6.34 (H) 0.67 - 1.17 mg/dL    GFR Estimate 9 (L) >60 mL/min/1.73m2    Calcium 9.7 8.6 - 10.0 mg/dL    Glucose 88 70 - 99 mg/dL   CBC with platelets and differential    Collection Time: 01/03/24  7:33 AM   Result Value Ref Range    WBC Count 3.8 (L) 4.0 - 11.0 10e3/uL    RBC Count 2.88 (L) 4.40 - 5.90 10e6/uL    Hemoglobin 8.6 (L) 13.3 - 17.7 g/dL    Hematocrit 27.4 (L) 40.0 - 53.0 %    MCV 95 78 - 100 fL    MCH 29.9 26.5 - 33.0 pg    MCHC 31.4 (L) 31.5 - 36.5 g/dL    RDW 14.8 10.0 - 15.0 %    Platelet Count 76 (L) 150 - 450 10e3/uL    % Neutrophils 39 %    % Lymphocytes 37 %    % Monocytes 15 %    % Eosinophils 8 %    % Basophils 1 %    % Immature Granulocytes 0 %    NRBCs per 100 WBC 0 <1 /100    Absolute Neutrophils 1.5 (L) 1.6 - 8.3 10e3/uL    Absolute Lymphocytes 1.4 0.8 - 5.3 10e3/uL    Absolute Monocytes 0.6 0.0 - 1.3 10e3/uL    Absolute Eosinophils 0.3 0.0 - 0.7 10e3/uL    Absolute Basophils 0.0 0.0 - 0.2 10e3/uL    Absolute Immature Granulocytes 0.0 <=0.4 10e3/uL    Absolute NRBCs 0.0 10e3/uL   Adult Type and Screen    Collection Time: 01/03/24  7:33 AM   Result Value Ref Range    ABO/RH(D) O POS     Antibody Screen Negative Negative    SPECIMEN EXPIRATION DATE [id number]    Hepatitis B surface antigen    Collection Time: 01/03/24  7:33 AM   Result Value Ref Range    Hepatitis B Surface Antigen Nonreactive Nonreactive   Hemoglobin and hematocrit    Collection Time: 01/04/24 11:45 AM   Result Value Ref Range    Hemoglobin 7.0 (L) 13.3 - 17.7 g/dL    Hematocrit 21.2 (L) 40.0 - 53.0 %   Hepatitis B Surface Antibody    Collection Time: 01/04/24  3:16 PM   Result Value Ref Range    Hepatitis B Surface Antibody Nonreactive     Hepatitis B Surface Antibody Instrument Value <3.50 <8.5 m[IU]/mL   Hemoglobin and hematocrit     Collection Time: 01/04/24  6:08 PM   Result Value Ref Range    Hemoglobin 6.7 (LL) 13.3 - 17.7 g/dL    Hematocrit 20.3 (L) 40.0 - 53.0 %   CONDITIONAL Prepare red blood cells (unit)    Collection Time: 01/04/24  6:35 PM   Result Value Ref Range    Blood Component Type Red Blood Cells     Product Code O1347W62     Unit Status Transfused     Unit Number [id number]     CROSSMATCH Compatible     CODING SYSTEM [id number]     ISSUE DATE AND TIME [id number]     UNIT ABO/RH O+     UNIT TYPE ISBT 5100    Hemoglobin and hematocrit    Collection Time: 01/05/24  1:33 AM   Result Value Ref Range    Hemoglobin 7.3 (L) 13.3 - 17.7 g/dL    Hematocrit 21.7 (L) 40.0 - 53.0 %   Hemoglobin and hematocrit    Collection Time: 01/05/24  8:24 AM   Result Value Ref Range    Hemoglobin 7.0 (L) 13.3 - 17.7 g/dL    Hematocrit 21.1 (L) 40.0 - 53.0 %   CONDITIONAL Prepare red blood cells (unit)    Collection Time: 01/05/24 12:59 PM   Result Value Ref Range    Blood Component Type Red Blood Cells     Product Code [id number]     Unit Status Transfused     Unit Number [id number]     CROSSMATCH Compatible     CODING SYSTEM [id number]     ISSUE DATE AND TIME [id number]     UNIT ABO/RH O+     UNIT TYPE ISBT 5100    Hemoglobin    Collection Time: 01/06/24 12:03 AM   Result Value Ref Range    Hemoglobin 7.7 (L) 13.3 - 17.7 g/dL   Glucose by meter    Collection Time: 01/06/24  9:01 AM   Result Value Ref Range    GLUCOSE BY METER POCT 142 (H) 70 - 99 mg/dL   CBC with platelets    Collection Time: 01/06/24  9:15 AM   Result Value Ref Range    WBC Count 5.2 4.0 - 11.0 10e3/uL    RBC Count 2.54 (L) 4.40 - 5.90 10e6/uL    Hemoglobin 7.8 (L) 13.3 - 17.7 g/dL    Hematocrit 23.9 (L) 40.0 - 53.0 %    MCV 94 78 - 100 fL    MCH 30.7 26.5 - 33.0 pg    MCHC 32.6 31.5 - 36.5 g/dL    RDW 15.5 (H) 10.0 - 15.0 %    Platelet Count 79 (L) 150 - 450 10e3/uL   Comprehensive metabolic panel    Collection Time: 01/06/24  9:15 AM   Result Value Ref Range    Sodium 127 (L)  135 - 145 mmol/L    Potassium 5.1 3.4 - 5.3 mmol/L    Carbon Dioxide (CO2) 29 22 - 29 mmol/L    Anion Gap 8 7 - 15 mmol/L    Urea Nitrogen 55.5 (H) 8.0 - 23.0 mg/dL    Creatinine 6.30 (H) 0.67 - 1.17 mg/dL    GFR Estimate 10 (L) >60 mL/min/1.73m2    Calcium 8.8 8.6 - 10.0 mg/dL    Chloride 90 (L) 98 - 107 mmol/L    Glucose 110 (H) 70 - 99 mg/dL    Alkaline Phosphatase 209 (H) 40 - 150 U/L    AST 23 0 - 45 U/L    ALT <5 0 - 70 U/L    Protein Total 6.9 6.4 - 8.3 g/dL    Albumin 3.5 3.5 - 5.2 g/dL    Bilirubin Total 1.2 <=1.2 mg/dL        Total time spent on the day of clinic visit was 88 min including face to face time of 45 min with pt and his wife, labs and image review, chart review including his previous admission and discharge notes since 2022 and care every where records, previous GI notes, medication review, ordering labs and documentation as above.      Again, thank you for allowing me to participate in the care of your patient.        Sincerely,        Enriqueta Garcia MD

## 2024-01-11 NOTE — PROGRESS NOTES
TRANSPLANT NEPHROLOGY RECIPIENT EVALUATION NOTE    Assessment and Plan:  # Kidney Transplant Evaluation: Patient is a good candidate overall. Benefits of a living donor transplant were discussed.    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 At that time his creatinine was up to 5. During that hospitalization was initiated on dialysis for acute renal failure and never recovered. His hospitalization was complicated with stroke (per discharge summary, no CT scans found) but no residual weakness noted.   He had tracheotomy for prolonged respiratory failure, had PEG tube which were removed in mid 2023 and since his breathing and weights been stable respectively.  Also had recurrent plural effusion with chest tube now removed   H/o pericardial effusion as well s/p pericardiocentesis x 2 in September and October 2023.    # Cardiac Risk: moderate risk with h/o PEA arrest in 2022 and his age. Likely need angiogram.     # PAD Screening: Will obtain updated imaging for Transplant Surgery to review    # CARRILLO s/p liver transplant in 2016 : Currently on tacrolimus only. Pt does not recall any side effects with MMF in the past. Have had ascites and SBP back in October 2023 during his hospitalization. Now on chronic prophylaxis with ciprofloxacin. No recurrence of his ascites/infection after that. Last visit with hepatology was in dec 2023 which stated his liver function is stable and no evidence of cirrhosis at that point.     # PEA arrest after acute hypoxic respiratory failure in November 2022: per chart review had out side hospital cardiac arrest with bystander CPR for 20 min. He again had PEA x 2 in ER but was able to achieve ROSC quickly.    # Afib: seems like paroxymal during stressful events. Not on rate or rhythm  control agents but anticoagulated with Eliqus 5 mg BID.    # squamous cell cancer June 15th 2023 : one lesion on arm likely after trauma. Removed with good edges, need to find those report    # Seizure during his hospitalization, never had seizures before or after his hospitalization. Reportedly noted while he was in hospitalized and started on antiepileptics. Working with neurology to wean off of lacosamide, currently taking once a day at night.    # Health Maintenance: Colonoscopy: Not up to date, last one was in August 2013 and needed to repeat in 5 years but not done yet Dermatology: Not up to date,need skin eval with h/o squamous cell and Dental: Up to date, mid last year had cleaning and a crown.     Discussed the risks and benefits of a transplant, including the risk of surgery and immunosuppression medications.  Patient's overall evaluation will be discussed in the Transplant Program's regular meeting with a final recommendation on the patients suitability for transplant to be made at that time.    Recommendations   Need cardiac angiogram with h/o PEA arrest   Need image of his abdominal vessels (either CT or US, of surgeon choice)  Need colonoscopy  Need dermatology eval with h/o SCC lesion     Evaluation:  Blanco Osborne was seen in consultation at the request of Dr. Meche Chaves for evaluation as a potential kidney transplant recipient.    Reason for Visit:  Blanco Osborne is a 58 year old male with ESKD from unknown etiology, who presents for kidney transplant evaluation.    History of Present Illness:  Pt presented to Nephrology clinic for pre transplant evaluation. He presented iwht his wife. Pt endorsed he is feeling farily well. Tolerating HD well with midodrine 10 mg only with dialysis. He had significnat events happened in November 2022 and followed into 2023. Since late June/July he is in steady state. Did had few admission again since then but discharged fairly quickly.  Mentioned he  underwent PT after his hospitalization after 2022 and now he is almost back to his baseline.  Recently had fistula problems including aneurysm repair. After he had fistula infiltrated, currently have right perm cath to give time for fistula to heal.    No h/o CHRISTIAN, but was on CPAP/BIPAP during hospitalization, planned Sleep evalution in feb.          Kidney Disease Hx:        Kidney Disease Dx: Unknown etiology likely 2/2 PEA arrest.       Biopsy Proven: No         On Dialysis: Yes, Date initiated: 2022, Dialysis Type: [facility] and Dialysis unit: [facility]        Primary Nephrologist: Dr. Bradshaw        H/o Kidney Stones: No       H/o Recurrent/Frequent UTI: No         Diabetic Hx: None           Cardiac/Vascular Disease Risk Factors:        Cardiac Risk Factors: CKD and Age (Male > 55, Female > 65)       Known CAD: No       Known PAD/Caludication Symptoms: No       Known Heart Failure: No       Arrhythmia: Yes; paroxymal afib       Pulmonary Hypertension: No       Valvular Disease: Yes; mild aortic insufficiency       Other: Hypotension on midodrine         Viral Serology Status       CMV IgG Antibody: Unknown       EBV IgG Antibody: Unknown         Volume Status/Weight:        Volume status: Euvolemic       Weight:  Acceptable BMI       BMI: Body mass index is 27.24 kg/m .         Functional Capacity/Frailty:        Good     Fatigue/Decreased Energy: [x] No [] Yes    Chest Pain or SOB with Exertion: [x] No [] Yes    Significant Weight Change: [x] No [] Yes    Nausea, Vomiting or Diarrhea: [x] No [] Yes    Fever, Sweats or Chills:  [x] No [] Yes    Leg Swelling [x] No [] Yes        History of Cancer: None    Other Significant Medical Issues: None    Possible Higher Risk Donor Option Preferences:   Donor with a high Kidney Donor Profile Index (KDPI) score: Yes   Donor with positive Hepatitis C status: Not discussed   Donor with positive Hepatitis B status:  Not sure   Donor with positive HIV status: Not  discussed   Donor with blood type A2B: Not discussed     Allergy Testing Questions:   Medication that caused a reaction None     Antibiotics used that didn't give an allergic reaction?  Patient doesn't know    COVID Vaccination Up To Date: Yes last dose late last year    Potential Living Kidney Donors: Yes need to be evaluted    Review of Systems:  A comprehensive review of systems was obtained and negative, except as noted in the HPI or PMH.    Past Medical History:   Medical record was reviewed and PMH was discussed with patient and noted below.  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    Cancer (H) 2023    Squamous Cell Cancer- Since resolved    Cirrhosis of liver with ascites (H) 02/11/2016    Coagulopathy (H24)     Critical illness myopathy     Dialysis patient (H24)     DIALYSIS 3X/ WEEK    Embolic stroke (H) 11/20/2022    Encephalopathy     ESRD (end stage renal disease) on dialysis (H)     Gastroesophageal reflux disease     H/O alcohol abuse     History of blood transfusion     Hyperammonemia (H24)     Hyperlipidemia     Hypertension     Patients states that HTN has been resolved    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    Insomnia     Lactic acidosis 11/12/2022    Liver replaced by transplant (H) 09/20/2016    NAFLD (nonalcoholic fatty liver disease) 02/11/2016    CHRISTIAN on CPAP     Parainfluenza type 1 infection 11/19/2022    Parapneumonic effusion     Pleural effusion     Pneumothorax on left 12/16/2022    Respiratory acidosis 11/12/2022    Respiratory arrest (H) 11/12/2022    Restless legs syndrome (RLS)     SBP (spontaneous bacterial peritonitis) (H)     MNGI    Seizures (H) 12/2022    Sepsis due to Streptococcus pneumoniae with acute hypoxic  respiratory failure (H) 11/19/2022    Stroke, embolic (H) 11/20/2022    Sudden cardiac arrest (H) 12/29/2022    PEA- 2 min of CPR    Tubular adenoma 10/2019    Large cecal adenoma- due for surveillance colonoscopy in 3 years (10/2022)       Past Social History:   Past Surgical History:   Procedure Laterality Date    APPENDECTOMY      BENCH LIVER N/A 09/20/2016    Procedure: BENCH LIVER;  Surgeon: Enoc Crews MD;  Location: [facility] OR    BRONCHOSCOPY FLEXIBLE AND RIGID N/A 12/20/2022    Procedure: BRONCHOSCOPY;  Surgeon: Alena Valenzuela MD;  Location:  GI    COLONOSCOPY  08/06/2013    repeat in 2018    COLONOSCOPY N/A 10/04/2019    Procedure: COLONOSCOPY, WITH POLYPECTOMY AND BIOPSY;  Surgeon: Go Chong MD;  Location: [facility] OR    CREATE FISTULA ARTERIOVENOUS UPPER EXTREMITY Left 03/21/2023    Procedure: LEFT UPPER EXTREMITY ARTERIOVENOUS FISTULA CREATION. LIGATION OF COMPETING VASCULAR BRANCHES- LEFT;  Surgeon: Deejay Mcneil MD;  Location:  OR    CV PERICARDIOCENTESIS N/A 9/29/2023    Procedure: Pericardiocentesis;  Surgeon: Barry Mckeon MD;  Location:  HEART CARDIAC CATH LAB    CV PERICARDIOCENTESIS N/A 10/11/2023    Procedure: Pericardiocentesis;  Surgeon: Ulises Bhakta MD;  Location:  HEART CARDIAC CATH LAB    CV RIGHT HEART CATH MEASUREMENTS RECORDED N/A 10/11/2023    Procedure: Right Heart Catheterization;  Surgeon: Ulises Bhakta MD;  Location:  HEART CARDIAC CATH LAB    HERNIA REPAIR      IR CHEST TUBE PLACEMENT NON-TUNNELED RIGHT  12/12/2022    IR CVC TUNNEL PLACEMENT > 5 YRS OF AGE  12/06/2022    IR CVC TUNNEL PLACEMENT > 5 YRS OF AGE  1/3/2024    IR DIALYSIS FISTULOGRAM LEFT  07/13/2023    IR GASTROSTOMY TUBE CHANGE  02/08/2023    IR GASTROSTOMY TUBE PERCUTANEOUS PLCMNT  11/29/2022    IR PARACENTESIS  11/29/2022    IR PARACENTESIS  12/01/2022    IR PARACENTESIS  2/10/2016    IR PARACENTESIS  2/28/2016    IR THORACENTESIS  12/06/2022    IR  THORACENTESIS  2020    IR THORACENTESIS  08/10/2020    IR TRANSCATHETER BIOPSY  2022    REVISION FISTULA ARTERIOVENOUS UPPER EXTREMITY Left 8/15/2023    Procedure: 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 2; OUTFLOW REVISION CEPHALIC TO BRACHIAL VEIN;  Surgeon: Deejay Mcneil MD;  Location: [facility] OR    REVISION FISTULA ARTERIOVENOUS UPPER EXTREMITY Left 1/3/2024    Procedure: Excision and repair of LEFT brachiocephalic arteriovenous fistula and vein patch angioplasty;  Surgeon: Deejay Mcneil MD;  Location: [facility] OR    TRACHEOSTOMY N/A 2022    Procedure: TRACHEOSTOMY;  Surgeon: Nilesh Jackson MD;  Location: [facility] OR    TRANSPLANT LIVER RECIPIENT  DONOR N/A 2016    Procedure: TRANSPLANT LIVER RECIPIENT  DONOR;  Surgeon: Enoc Crews MD;  Location: [facility] OR     Personal history of bleeding or anesthesia problems: No    Family History:  Family History   Problem Relation Age of Onset    Coronary Artery Disease No family hx of     Cardiomyopathy No family hx of        Personal History:   Social History     Socioeconomic History    Marital status:      Spouse name: Not on file    Number of children: Not on file    Years of education: Not on file    Highest education level: Not on file   Occupational History    Not on file   Tobacco Use    Smoking status: Never    Smokeless tobacco: Never   Vaping Use    Vaping Use: Never used   Substance and Sexual Activity    Alcohol use: Not Currently     Alcohol/week: 0.0 standard drinks of alcohol    Drug use: No    Sexual activity: Not Currently   Other Topics Concern    Parent/sibling w/ CABG, MI or angioplasty before 65F 55M? Not Asked   Social History Narrative    Not on file     Social Determinants of Health     Financial Resource Strain: Low Risk  (2023)    Financial Resource Strain     Within the past 12 months, have you or your family members you live with been unable to get utilities (heat,  electricity) when it was really needed?: No   Food Insecurity: Low Risk  (11/2/2023)    Food Insecurity     Within the past 12 months, did you worry that your food would run out before you got money to buy more?: No     Within the past 12 months, did the food you bought just not last and you didn t have money to get more?: No   Transportation Needs: Low Risk  (11/2/2023)    Transportation Needs     Within the past 12 months, has lack of transportation kept you from medical appointments, getting your medicines, non-medical meetings or appointments, work, or from getting things that you need?: No   Physical Activity: Not on file   Stress: Not on file   Social Connections: Not on file   Interpersonal Safety: Low Risk  (9/21/2023)    Interpersonal Safety     Do you feel physically and emotionally safe where you currently live?: Yes     Within the past 12 months, have you been hit, slapped, kicked or otherwise physically hurt by someone?: No     Within the past 12 months, have you been humiliated or emotionally abused in other ways by your partner or ex-partner?: No   Housing Stability: Low Risk  (11/2/2023)    Housing Stability     Do you have housing? : Yes     Are you worried about losing your housing?: No       Allergies:  No Known Allergies    Medications:  Current Outpatient Medications   Medication Sig    acetaminophen (TYLENOL) 325 MG tablet Take 2 tablets (650 mg) by mouth every 8 hours as needed for other (For optimal non-opioid multimodal pain management to improve pain control.)    ciprofloxacin (CIPRO) 250 MG tablet Take 1 tablet (250 mg) by mouth every 24 hours for 90 days    ELIQUIS ANTICOAGULANT 5 MG tablet Take 5 mg by mouth 2 times daily    ferrous sulfate (FEROSUL) 325 (65 Fe) MG tablet Take 1 tablet (325 mg) by mouth daily for 180 days    gabapentin (NEURONTIN) 100 MG capsule Take 3 capsules (300 mg) by mouth at bedtime    hydrOXYzine (ATARAX) 25 MG tablet Take 1 tablet (25 mg) by mouth 3 times daily  "as needed for itching    Lacosamide (VIMPAT) 100 MG TABS tablet Take 1 tablet (100 mg) by mouth every evening    lacosamide (VIMPAT) 50 MG TABS tablet Take 1 tablet (50 mg) by mouth every morning    melatonin 3 MG tablet Take 1 tablet (3 mg) by mouth At Bedtime (Patient taking differently: Take 3 mg by mouth nightly as needed)    midodrine (PROAMATINE) 10 MG tablet TAKE 1 TABLET 3 X DAILY. ON DIALYSIS DAYS(M, W, F) TAKE 2 EXTRA TABLETS 1 HOUR BEFORE, 1 TABLET WHEN YOU ARRIVE, AND 1 TABLET DURING DIALYSIS    multivitamin RENAL (TRIPHROCAPS) 1 capsule capsule Take 1 capsule by mouth daily    oxyCODONE (ROXICODONE) 5 MG tablet Take 1 tablet (5 mg) by mouth every 6 hours as needed for severe pain    sevelamer carbonate (RENVELA) 800 MG tablet Take 800 mg by mouth 4 times daily With meals and snacks    tacrolimus (GENERIC) 0.5 MG capsule Take 1 capsule (0.5 mg) by mouth 2 times daily for 90 days     No current facility-administered medications for this visit.       Vitals:  /68   Pulse 71   Ht 1.82 m (5' 11.65\")   Wt 90.2 kg (198 lb 14.4 oz)   SpO2 98%   BMI 27.24 kg/m      Exam:  GENERAL APPEARANCE: alert and no distress  EYES: eyes grossly normal to inspection  HENT: mouth without ulcers or lesions  RESP: lungs clear to auscultation - no rales, rhonchi or wheezes  CV: regular rhythm, normal rate, no murmur appreciated  EDEMA: no LE edema bilaterally  ABDOMEN: soft, nondistended, non tender  MS: extremities normal - no gross deformities noted, no evidence of inflammation in joints, no muscle tenderness  SKIN: no rash  NEURO: normal strength and tone, sensory exam grossly normal, mentation intact and speech normal  PSYCH: mentation appears normal and affect normal/bright  DIALYSIS ACCESS:  LUE AV fistula with good thrill but undergoing HD through right perm cath  He recently had fistula infiltration, so had perm cath.    Results:   Recent Results (from the past 336 hour(s))   Basic metabolic panel    " Collection Time: 12/31/23  1:49 AM   Result Value Ref Range    Sodium 136 135 - 145 mmol/L    Potassium 4.2 3.4 - 5.3 mmol/L    Chloride 90 (L) 98 - 107 mmol/L    Carbon Dioxide (CO2) 25 22 - 29 mmol/L    Anion Gap 21 (H) 7 - 15 mmol/L    Urea Nitrogen 100.4 (H) 8.0 - 23.0 mg/dL    Creatinine 10.60 (H) 0.67 - 1.17 mg/dL    GFR Estimate 5 (L) >60 mL/min/1.73m2    Calcium 9.3 8.6 - 10.0 mg/dL    Glucose 105 (H) 70 - 99 mg/dL   Extra Purple Top Tube    Collection Time: 12/31/23  1:49 AM   Result Value Ref Range    Hold Specimen JI    EKG 12 lead    Collection Time: 12/31/23  2:40 AM   Result Value Ref Range    Systolic Blood Pressure  mmHg    Diastolic Blood Pressure  mmHg    Ventricular Rate 68 BPM    Atrial Rate 68 BPM    AR Interval 202 ms    QRS Duration 90 ms     ms    QTc 463 ms    P Axis 55 degrees    R AXIS 28 degrees    T Axis 36 degrees    Interpretation ECG       Sinus rhythm  Normal ECG  When compared with ECG of 30-OCT-2023 06:41,  Minimal criteria for Anterior infarct are no longer Present  T wave amplitude has increased in Anterior leads  QT has lengthened  Confirmed by GENERATED REPORT, [employer] ([id number]),  Aasen, Bradley ([id number]) on 12/31/2023 3:52:32 AM     EKG 12-lead, tracing only    Collection Time: 01/01/24  8:07 AM   Result Value Ref Range    Systolic Blood Pressure  mmHg    Diastolic Blood Pressure  mmHg    Ventricular Rate 64 BPM    Atrial Rate 64 BPM    AR Interval 198 ms    QRS Duration 88 ms     ms    QTc 466 ms    P Axis 50 degrees    R AXIS 46 degrees    T Axis 21 degrees    Interpretation ECG       Sinus rhythm  Low voltage QRS  Cannot rule out Anterior infarct , age undetermined  Abnormal ECG  When compared with ECG of 31-DEC-2023 02:40,  No significant change was found  Confirmed by GENERATED REPORT, COMPUTER ([id number]),  Chelsea Chand ([id number]) on 1/1/2024 8:29:29 AM     Comprehensive metabolic panel    Collection Time: 01/01/24  8:13 AM   Result Value Ref Range     Sodium 135 135 - 145 mmol/L    Potassium 4.7 3.4 - 5.3 mmol/L    Carbon Dioxide (CO2) 24 22 - 29 mmol/L    Anion Gap 20 (H) 7 - 15 mmol/L    Urea Nitrogen 124.5 (H) 8.0 - 23.0 mg/dL    Creatinine 11.96 (H) 0.67 - 1.17 mg/dL    GFR Estimate 4 (L) >60 mL/min/1.73m2    Calcium 9.1 8.6 - 10.0 mg/dL    Chloride 91 (L) 98 - 107 mmol/L    Glucose 90 70 - 99 mg/dL    Alkaline Phosphatase 178 (H) 40 - 150 U/L    AST 17 0 - 45 U/L    ALT 10 0 - 70 U/L    Protein Total 7.4 6.4 - 8.3 g/dL    Albumin 4.0 3.5 - 5.2 g/dL    Bilirubin Total 0.4 <=1.2 mg/dL   Blood Culture Peripheral Blood    Collection Time: 01/01/24  8:13 AM    Specimen: Peripheral Blood   Result Value Ref Range    Culture No Growth    Blood Culture Peripheral Blood    Collection Time: 01/01/24  8:13 AM    Specimen: Peripheral Blood   Result Value Ref Range    Culture No Growth    CBC with platelets and differential    Collection Time: 01/01/24  8:13 AM   Result Value Ref Range    WBC Count 3.5 (L) 4.0 - 11.0 10e3/uL    RBC Count 2.86 (L) 4.40 - 5.90 10e6/uL    Hemoglobin 8.6 (L) 13.3 - 17.7 g/dL    Hematocrit 26.4 (L) 40.0 - 53.0 %    MCV 92 78 - 100 fL    MCH 30.1 26.5 - 33.0 pg    MCHC 32.6 31.5 - 36.5 g/dL    RDW 14.6 10.0 - 15.0 %    Platelet Count 63 (L) 150 - 450 10e3/uL    % Neutrophils 52 %    % Lymphocytes 27 %    % Monocytes 14 %    % Eosinophils 6 %    % Basophils 1 %    % Immature Granulocytes 0 %    NRBCs per 100 WBC 0 <1 /100    Absolute Neutrophils 1.9 1.6 - 8.3 10e3/uL    Absolute Lymphocytes 1.0 0.8 - 5.3 10e3/uL    Absolute Monocytes 0.5 0.0 - 1.3 10e3/uL    Absolute Eosinophils 0.2 0.0 - 0.7 10e3/uL    Absolute Basophils 0.0 0.0 - 0.2 10e3/uL    Absolute Immature Granulocytes 0.0 <=0.4 10e3/uL    Absolute NRBCs 0.0 10e3/uL   Vitamin B12    Collection Time: 01/02/24 11:27 PM   Result Value Ref Range    Vitamin B12 948 232 - 1,245 pg/mL   Folate    Collection Time: 01/02/24 11:27 PM   Result Value Ref Range    Folic Acid 27.6 4.6 - 34.8 ng/mL    Comprehensive metabolic panel    Collection Time: 01/02/24 11:27 PM   Result Value Ref Range    Sodium 138 135 - 145 mmol/L    Potassium 4.5 3.4 - 5.3 mmol/L    Carbon Dioxide (CO2) 31 (H) 22 - 29 mmol/L    Anion Gap 14 7 - 15 mmol/L    Urea Nitrogen 47.0 (H) 8.0 - 23.0 mg/dL    Creatinine 5.73 (H) 0.67 - 1.17 mg/dL    GFR Estimate 11 (L) >60 mL/min/1.73m2    Calcium 9.3 8.6 - 10.0 mg/dL    Chloride 93 (L) 98 - 107 mmol/L    Glucose 93 70 - 99 mg/dL    Alkaline Phosphatase 179 (H) 40 - 150 U/L    AST 22 0 - 45 U/L    ALT 12 0 - 70 U/L    Protein Total 7.5 6.4 - 8.3 g/dL    Albumin 4.2 3.5 - 5.2 g/dL    Bilirubin Total 0.8 <=1.2 mg/dL   Bld morphology pathology review    Collection Time: 01/02/24 11:27 PM   Result Value Ref Range    Final Diagnosis       Peripheral blood, morphology:  - Marked anemia, normocytic and normochromic.  - Moderate thrombocytopenia; no abnormal platelet clumping seen on the slide preparations.  - WBC subsets quantitatively within normal limits and without diagnostic morphologic abnormality.  - Negative for schistocytes and definitive morphologic features of hemolysis.   - Negative for overt features of myelodysplasia and circulating blasts.    See comment.    COMMENT:  Normocytic anemia is nonspecific and may be attributable to multiple overlapping etiologies, including chronic disease, renal and/or endocrine disease, blood loss, iron deficiency (in some patients), and/or bone marrow suppression (by underlying infection, various nutritional deficiencies, toxicities, alcohol use, or medications).    Thrombocytopenia is nonspecific and possible etiologies may include immune-associated platelet destruction (such as idiopathic thrombocytopenic purpura [ITP], heparin-induced thrombocytopenia [HIT], post-transfusion purpura, or drug-induced antibodies), non-immune-associated platelet destruction (such as recent thrombosis or thrombotic microangiopathies), decreased platelet production (such as  vitamin B12 or folate deficiency, underlying infection, myelodysplasia and other hematologic malignancies, aplastic anemia, or bone marrow suppression [by alcohol, medications, chemotherapy, radiation, or marrow infiltrative processes]), medications (including immunosuppressive agents), splenic sequestration of platelets, or liver disease.  Clinical correlation is recommended.      Peripheral Smear       A microscopic examination is performed.       Peripheral Hematologic Data        Latest Reference Range & Units 01/02/24 23:27   WBC 4.0 - 11.0 10e3/uL 4.0   Hemoglobin 13.3 - 17.7 g/dL 8.7 (L)   Hematocrit 40.0 - 53.0 % 26.6 (L)   Platelet Count 150 - 450 10e3/uL 78 (L)   RBC Count 4.40 - 5.90 10e6/uL 2.83 (L)   MCV 78 - 100 fL 94   MCH 26.5 - 33.0 pg 30.7   MCHC 31.5 - 36.5 g/dL 32.7   RDW 10.0 - 15.0 % 14.7   % Neutrophils % 43   % Lymphocytes % 32   % Monocytes % 17   % Eosinophils % 7   % Basophils % 1   Absolute Basophils 0.0 - 0.2 10e3/uL 0.0   Absolute Eosinophils 0.0 - 0.7 10e3/uL 0.3   Absolute Immature Granulocytes <=0.4 10e3/uL 0.0   Absolute Lymphocytes 0.8 - 5.3 10e3/uL 1.3   Absolute Monocytes 0.0 - 1.3 10e3/uL 0.7   % Immature Granulocytes % 0   Absolute Neutrophils 1.6 - 8.3 10e3/uL 1.8   Absolute NRBCs 10e3/uL 0.0   NRBCs per 100 WBC <1 /100 0   % Retic 0.5 - 2.0 % 1.2   Absolute Retic 0.025 - 0.095 10e6/uL 0.035   (L): Data is abnormally low    For patients over 18 years old, anemia and quantitative abnormalities of WBC and platelets (if present) may be further stratified as follows:    WBC (10^9/L):    Greater than 50.0: Marked leukocytosis    18.0 - 50.0: Moderate leukocytosis    11.1 - 17.9: Mild leukocytosis    3.0 - 3.9: Mild leukopenia    2.0 - 2.9: Moderate leukopenia    Less than 2.0: Marked leukopenia    Hemoglobin (g/dL) in males:    11.3 - 13.2: Mild anemia    9.3 - 11.2: Moderate anemia    Less than 9.3: Marked anemia    Platelets (10^9/L):    Greater than 900: Marked thrombocytosis     601 - 900: Moderate thrombocytosis    451 - 600: Mild thrombocytosis    100 - 149: Mild thrombocytopenia    50 - 99: Moderate thrombocytopenia    Less than 50: Marked thrombocytopenia       Performing Labs       The technical component of this testing was completed at [facility], [facility] and [facility]     CBC with platelets and differential    Collection Time: 01/02/24 11:27 PM   Result Value Ref Range    WBC Count 4.0 4.0 - 11.0 10e3/uL    RBC Count 2.83 (L) 4.40 - 5.90 10e6/uL    Hemoglobin 8.7 (L) 13.3 - 17.7 g/dL    Hematocrit 26.6 (L) 40.0 - 53.0 %    MCV 94 78 - 100 fL    MCH 30.7 26.5 - 33.0 pg    MCHC 32.7 31.5 - 36.5 g/dL    RDW 14.7 10.0 - 15.0 %    Platelet Count 78 (L) 150 - 450 10e3/uL    % Neutrophils 43 %    % Lymphocytes 32 %    % Monocytes 17 %    % Eosinophils 7 %    % Basophils 1 %    % Immature Granulocytes 0 %    NRBCs per 100 WBC 0 <1 /100    Absolute Neutrophils 1.8 1.6 - 8.3 10e3/uL    Absolute Lymphocytes 1.3 0.8 - 5.3 10e3/uL    Absolute Monocytes 0.7 0.0 - 1.3 10e3/uL    Absolute Eosinophils 0.3 0.0 - 0.7 10e3/uL    Absolute Basophils 0.0 0.0 - 0.2 10e3/uL    Absolute Immature Granulocytes 0.0 <=0.4 10e3/uL    Absolute NRBCs 0.0 10e3/uL   Reticulocyte count    Collection Time: 01/02/24 11:27 PM   Result Value Ref Range    % Reticulocyte 1.2 0.5 - 2.0 %    Absolute Reticulocyte 0.035 0.025 - 0.095 10e6/uL   Basic metabolic panel    Collection Time: 01/03/24  7:33 AM   Result Value Ref Range    Sodium 134 (L) 135 - 145 mmol/L    Potassium 4.4 3.4 - 5.3 mmol/L    Chloride 91 (L) 98 - 107 mmol/L    Carbon Dioxide (CO2) 32 (H) 22 - 29 mmol/L    Anion Gap 11 7 - 15 mmol/L    Urea Nitrogen 54.6 (H) 8.0 - 23.0 mg/dL    Creatinine 6.34 (H) 0.67 - 1.17 mg/dL    GFR Estimate 9 (L) >60 mL/min/1.73m2    Calcium 9.7 8.6 - 10.0 mg/dL    Glucose 88 70 - 99 mg/dL   CBC with platelets and differential     Collection Time: 01/03/24  7:33 AM   Result Value Ref Range    WBC Count 3.8 (L) 4.0 - 11.0 10e3/uL    RBC Count 2.88 (L) 4.40 - 5.90 10e6/uL    Hemoglobin 8.6 (L) 13.3 - 17.7 g/dL    Hematocrit 27.4 (L) 40.0 - 53.0 %    MCV 95 78 - 100 fL    MCH 29.9 26.5 - 33.0 pg    MCHC 31.4 (L) 31.5 - 36.5 g/dL    RDW 14.8 10.0 - 15.0 %    Platelet Count 76 (L) 150 - 450 10e3/uL    % Neutrophils 39 %    % Lymphocytes 37 %    % Monocytes 15 %    % Eosinophils 8 %    % Basophils 1 %    % Immature Granulocytes 0 %    NRBCs per 100 WBC 0 <1 /100    Absolute Neutrophils 1.5 (L) 1.6 - 8.3 10e3/uL    Absolute Lymphocytes 1.4 0.8 - 5.3 10e3/uL    Absolute Monocytes 0.6 0.0 - 1.3 10e3/uL    Absolute Eosinophils 0.3 0.0 - 0.7 10e3/uL    Absolute Basophils 0.0 0.0 - 0.2 10e3/uL    Absolute Immature Granulocytes 0.0 <=0.4 10e3/uL    Absolute NRBCs 0.0 10e3/uL   Adult Type and Screen    Collection Time: 01/03/24  7:33 AM   Result Value Ref Range    ABO/RH(D) O POS     Antibody Screen Negative Negative    SPECIMEN EXPIRATION DATE [id number]    Hepatitis B surface antigen    Collection Time: 01/03/24  7:33 AM   Result Value Ref Range    Hepatitis B Surface Antigen Nonreactive Nonreactive   Hemoglobin and hematocrit    Collection Time: 01/04/24 11:45 AM   Result Value Ref Range    Hemoglobin 7.0 (L) 13.3 - 17.7 g/dL    Hematocrit 21.2 (L) 40.0 - 53.0 %   Hepatitis B Surface Antibody    Collection Time: 01/04/24  3:16 PM   Result Value Ref Range    Hepatitis B Surface Antibody Nonreactive     Hepatitis B Surface Antibody Instrument Value <3.50 <8.5 m[IU]/mL   Hemoglobin and hematocrit    Collection Time: 01/04/24  6:08 PM   Result Value Ref Range    Hemoglobin 6.7 (LL) 13.3 - 17.7 g/dL    Hematocrit 20.3 (L) 40.0 - 53.0 %   CONDITIONAL Prepare red blood cells (unit)    Collection Time: 01/04/24  6:35 PM   Result Value Ref Range    Blood Component Type Red Blood Cells     Product Code O4067G92     Unit Status Transfused     Unit Number  [id number]     CROSSMATCH Compatible     CODING SYSTEM [id number]     ISSUE DATE AND TIME [id number]     UNIT ABO/RH O+     UNIT TYPE ISBT 5100    Hemoglobin and hematocrit    Collection Time: 01/05/24  1:33 AM   Result Value Ref Range    Hemoglobin 7.3 (L) 13.3 - 17.7 g/dL    Hematocrit 21.7 (L) 40.0 - 53.0 %   Hemoglobin and hematocrit    Collection Time: 01/05/24  8:24 AM   Result Value Ref Range    Hemoglobin 7.0 (L) 13.3 - 17.7 g/dL    Hematocrit 21.1 (L) 40.0 - 53.0 %   CONDITIONAL Prepare red blood cells (unit)    Collection Time: 01/05/24 12:59 PM   Result Value Ref Range    Blood Component Type Red Blood Cells     Product Code M3043D70     Unit Status Transfused     Unit Number [id number]     CROSSMATCH Compatible     CODING SYSTEM [id number]     ISSUE DATE AND TIME [id number]     UNIT ABO/RH O+     UNIT TYPE ISBT 5100    Hemoglobin    Collection Time: 01/06/24 12:03 AM   Result Value Ref Range    Hemoglobin 7.7 (L) 13.3 - 17.7 g/dL   Glucose by meter    Collection Time: 01/06/24  9:01 AM   Result Value Ref Range    GLUCOSE BY METER POCT 142 (H) 70 - 99 mg/dL   CBC with platelets    Collection Time: 01/06/24  9:15 AM   Result Value Ref Range    WBC Count 5.2 4.0 - 11.0 10e3/uL    RBC Count 2.54 (L) 4.40 - 5.90 10e6/uL    Hemoglobin 7.8 (L) 13.3 - 17.7 g/dL    Hematocrit 23.9 (L) 40.0 - 53.0 %    MCV 94 78 - 100 fL    MCH 30.7 26.5 - 33.0 pg    MCHC 32.6 31.5 - 36.5 g/dL    RDW 15.5 (H) 10.0 - 15.0 %    Platelet Count 79 (L) 150 - 450 10e3/uL   Comprehensive metabolic panel    Collection Time: 01/06/24  9:15 AM   Result Value Ref Range    Sodium 127 (L) 135 - 145 mmol/L    Potassium 5.1 3.4 - 5.3 mmol/L    Carbon Dioxide (CO2) 29 22 - 29 mmol/L    Anion Gap 8 7 - 15 mmol/L    Urea Nitrogen 55.5 (H) 8.0 - 23.0 mg/dL    Creatinine 6.30 (H) 0.67 - 1.17 mg/dL    GFR Estimate 10 (L) >60 mL/min/1.73m2    Calcium 8.8 8.6 - 10.0 mg/dL    Chloride 90 (L) 98 - 107 mmol/L    Glucose 110 (H) 70 - 99 mg/dL     Alkaline Phosphatase 209 (H) 40 - 150 U/L    AST 23 0 - 45 U/L    ALT <5 0 - 70 U/L    Protein Total 6.9 6.4 - 8.3 g/dL    Albumin 3.5 3.5 - 5.2 g/dL    Bilirubin Total 1.2 <=1.2 mg/dL        Total time spent on the day of clinic visit was 88 min including face to face time of 45 min with pt and his wife, labs and image review, chart review including his previous admission and discharge notes since 2022 and care every where records, previous GI notes, medication review, ordering labs and documentation as above.

## 2024-01-11 NOTE — LETTER
1/11/2024         RE: Blanco Osborne  [address]  [facility] [address]        Dear Colleague,    Thank you for referring your patient, Blanco Osborne, to the [facility]. Please see a copy of my visit note below.    [facility]ID [facility]GAN TRANSPLANT  OUTPATIENT MNT: KIDNEY TRANSPLANT EVALUATION    Current BMI: 27.2 (HT 71.65 in,  lbs/90 kg)  BMI guideline for kidney transplant up to a BMI of 40 / per surgeon discretion     Frailty Assessment-- Not Frail (2/5 points)- low activity level, reduced      Reference:  Score of 0-2 = Not Frail  Score of 3-5 = Frail      TIME SPENT: 30 minutes  VISIT TYPE: Initial   REFERRING PHYSICIAN: LIEN Parson   PT ACCOMPANIED BY: his wife    History of previous txp: liver 2016  Dialysis: HD 11/2022 AM shift     NUTRITION ASSESSMENT  Pt with some inpatient stays the past year. Spring of 2023 he was needing TF (no longer needing). He has also recently been intubated/trached (now removed). He plans to meet with ENT due to voice concerns. He reports if he eats too fast, sometimes feels like food gets stuck in throat. He has done a swallow eval OP after discharge, with no reported diet/texture modifications.     - Appetite: good/baseline   - Food allergies/intolerances: none   - Meal prep & grocery shopping: pt or his wife   - Previous RD education: yes  - Issues chewing or swallowing: see above   - N/V/D/C: no   - Food access concerns: not asked     Vitamins, Supplements, Pertinent Meds: iron, MVI renal, renvela (takes 90% of the time), Natural Calm (mg) for restlessness, Ancient Nutrition Collagen plus powder (mixes into Ensure)  Herbal Medicines/Supplements: turmeric chews, beet chews (takes these occasionally)-- recommend avoiding   Protein Supplement: Gelatein plus (20 g protein)- 1x/day, Ensure Clear (occasional), protein bars (3x/week at dialysis)    Edema: none     Weight hx: stable within the past year    PHYSICAL  ACTIVITY   Physical therapy- off/on since hospital stay    Needs help with some ADLs- arm/fistula pain   Was doing weights, etc    DIET RECALL  Not consistent eating due to meds, illness  Yesterday he had:  Gelatein, grapes, pineapple, cheeseburger, protein bar     Little- 1 L FR- 1 Ensure Clear/day, water, 8-16 oz juice/lemonade, 1 pop/week, almond milk in cereal   No alcohol  Dines out for 20% of meals    LABS  1/6 K 5.1 - some highs in Oct  11/1 Phos 4.0     NUTRITION DIAGNOSIS   No nutrition diagnosis identified at this time     NUTRITION INTERVENTION   Nutrition education provided:  Discussed sodium intake (low sodium foods and drinks, seasoning food without salt and tips for low sodium diet).  Reviewed K/Phos levels, protein needs being on dialysis. Pt relies on protein supplements to help achieve protein goals. Reviewed avoiding grapefruit & pomegranate being on Tac. Also reviewed food safety practices, which are life long.     Reviewed post txp diet guidelines in brief (will review in further detail post txp):  (1) Review of proper food safety measures d/t immunosuppressant therapy post-op and increased risk for food-borne illness    (2) Avoid the following post txp d/t risk for rejection, unknown effects on the organs, and/or potential interactions with immunosuppressants:  - Herbal, Chinese, holistic, chiropractic, natural, alternative medicines and supplements  - Detoxes and cleanses  - Weight loss pills  - Protein powders or other products with extracts or herbs (ie green tea extract)    (3) Med regimen and possible side effects    Patient Understanding: Pt verbalized understanding of education provided.  Expected Engagement: Good  Follow-Up Plans: PRN     NUTRITION GOALS   No nutrition goals identified at this time     Gunjan Grullon, RD, LD, CCTD                                      Again, thank you for allowing me to participate in the care of your patient.        Sincerely,        Gunjan Grullon,  RD

## 2024-01-11 NOTE — LETTER
1/11/2024         RE: Blanco Osborne  [address]  [facility] [address]        Dear Colleague,    Thank you for referring your patient, Blanco Osborne, to the [facility]. Please see a copy of my visit note below.    Transplant Surgery Consult Note     Medical record number: [id number]  YOB: 1965,   Consult requested  for evaluation of kidney transplant candidacy.    Assessment and Recommendations:Mr. Osborne appears to be a good candidate for kidney transplantation and has a fair understanding of the risks and benefits of this approach to the management of renal failure. The following issues should be addressed prior to finalizing his transplant candidacy:     Transplant order: Mr. Osborne has End stage renal failure due to from acute renal failure post arrest : had stable renal function with creatinine of 0.9-1.2 till 2020 and admitted in November 2022 after out side hospital cardiac arrest followed by hypoxic respiratory failure with bacterial and fungal pneumonia  whose condition is not expected to resolve, is expected to progress, and is expected to continue to develop related comorbid conditions.  Recommend he be considered as a candidate for kidney.  Cardiology consult for cardiac risk stratification to be ordered: Yes  CT abdomen and pelvis without contrast to be ordered for assessment of vascular targets: Yes  Transplant listing labs ordered to include HLA, ABOx2, Cr, etc.  Dietician consult ordered: Yes  Social work consult ordered: Yes  Imaging reports reviewed:  Needs CT  Radiology images reviewed:Needs CT  Recipient suitable to move forward with work up of living donors:  Yes  Needs cards clearance: hx of cardiac arrest times 3    Hx of a fib-on eliqus.  Needs cards clearance   Hx of sz.  Need neuro notes  Hx of liver txp.  Stable per hepatology  Appears to have some poor health literacy but relies on wife.   Needs living donor.   Hx of  sepsis.  tracheotomy for prolonged respiratory failure, had PEG tube which were removed in mid    hospitalization was complicated with stroke?- scans needed    The majority of our visit was spent in counselling, discussing the medical and surgical risks of kidney transplantation. We discussed approximate wait time and how that is influenced by issues such as blood type and sensitization (PRA) and access to a living donor. I contrasted potential waiting time for living vs  donor kidneys from  normal (0-85%) or higher (%) kidney donor profile index (KDPI) donors and their associated outcomes. I would not recommend this individual to consider kidneys from high KDPI donors. The reason for this decision is best summarized as: multiple potential living donors. Potential surgical complications of kidney transplantation include bleeding, superficial or deep wound complications (infection, hernia, lymphocele), ureteral anastomotic failure (leak or stenosis), graft thrombosis, need for reoperation and other issues such as cardiac complications, pneumonia, deep venous thrombosis, pulmonary embolism, post transplant diabetes and death. The potential for recurrent disease or need for retransplantation was also addressed. We discussed the possible need for ureteral stent (and subsequent removal), and the utility of protocol biopsy and laboratory studies to evaluate for rejection or recurrent disease. We discussed the risk of graft rejection, our center's average graft and patient survival rates, immunosuppression protocols, as well as the potential opportunity to participate in clinical trials.  We also discussed the average length of stay, recovery process, and posttransplant lab and monitoring protocol.  I emphasized the need for strict immunosuppression medication adherence and the potential for complications of immunosuppression such as skin cancer or lymphoma, as well as a very low but not zero risk of  donor-derived disease transmission risks (infection, cancer). Mr. Osborne asked good questions and his candidacy will be reviewed at our Multidisciplinary Selection Committee. Thank you for the opportunity to participate in Mr. Osborne's care.      Total time: 50 minutes  Counselling time: 40 minutes        ---------------------------------------------------------------------------------------------------    HPI: Mr. Osborne has End stage renal failure due to from acute renal failure post arrest : had stable renal function with creatinine of 0.9-1.2 till 2020 and admitted in November 2022 after out side hospital cardiac arrest followed by hypoxic respiratory failure with bacterial and fungal pneumonia . The patient is non-diabetic.       The patient is on dialysis.    Has potential kidney donors:  Yes .  Interested in participation in paired exchange if a donor is willing: Doesn't know     The patient has the following pertinent history:       No    Yes  Dialysis:    []      [x] via:   CVC line  Blood Transfusion                  []      [x]  Number of units:  2 Most recently:2014  Pregnancy:    [x]      [] Number:       Previous Transplant:  []      [x] Details:  liver txp 9/2016  Cancer    []      [x] Comment:   Kidney stones   [x]      [] Comment:      Recurrent infections  [x]      []  Type:                  Bladder dysfunction  []      [x] Cause:    Claudication   [x]      [] Distance:    Previous Amputation  [x]      [] Cause:     Chronic anticoagulation  []      [x] Indication: eliqus   Denominational  [x]      []      Past Medical History:   Diagnosis Date     Acquired immunocompromised state (H24) 11/19/2022     Acute renal failure, unspecified acute renal failure type (H24) 11/12/2022     Antiplatelet or antithrombotic long-term use      Arrhythmia     PEA     Ascites      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     Cancer (H) 2023    Squamous Cell Cancer- Since resolved     Cirrhosis of liver with  ascites (H) 02/11/2016     Coagulopathy (H24)      Critical illness myopathy      Dialysis patient (H24)     DIALYSIS 3X/ WEEK     Embolic stroke (H) 11/20/2022     Encephalopathy      ESRD (end stage renal disease) on dialysis (H)      Gastroesophageal reflux disease      H/O alcohol abuse      History of blood transfusion      Hyperammonemia (H24)      Hyperlipidemia      Hypertension     Patients states that HTN has been resolved     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     Insomnia      Lactic acidosis 11/12/2022     Liver replaced by transplant (H) 09/20/2016     NAFLD (nonalcoholic fatty liver disease) 02/11/2016     CHRISTIAN on CPAP      Parainfluenza type 1 infection 11/19/2022     Parapneumonic effusion      Pleural effusion      Pneumothorax on left 12/16/2022     Respiratory acidosis 11/12/2022     Respiratory arrest (H) 11/12/2022     Restless legs syndrome (RLS)      SBP (spontaneous bacterial peritonitis) (H)     MNGI     Seizures (H) 12/2022     Sepsis due to Streptococcus pneumoniae with acute hypoxic respiratory failure (H) 11/19/2022     Stroke, embolic (H) 11/20/2022     Sudden cardiac arrest (H) 12/29/2022    PEA- 2 min of CPR     Tubular adenoma 10/2019    Large cecal adenoma- due for surveillance colonoscopy in 3 years (10/2022)     Past Surgical History:   Procedure Laterality Date     APPENDECTOMY       BENCH LIVER N/A 09/20/2016    Procedure: BENCH LIVER;  Surgeon: Enoc Crews MD;  Location: [facility] OR     BRONCHOSCOPY FLEXIBLE AND RIGID N/A 12/20/2022    Procedure: BRONCHOSCOPY;  Surgeon: Alena Valenzuela MD;  Location:  GI     COLONOSCOPY  08/06/2013    repeat in 2018     COLONOSCOPY N/A 10/04/2019    Procedure: COLONOSCOPY, WITH POLYPECTOMY AND BIOPSY;  Surgeon: Go Chong MD;  Location: [facility] OR     CREATE FISTULA ARTERIOVENOUS UPPER EXTREMITY Left 03/21/2023    Procedure: LEFT UPPER EXTREMITY ARTERIOVENOUS FISTULA CREATION. LIGATION OF COMPETING VASCULAR  BRANCHES- LEFT;  Surgeon: Deejay Mcneil MD;  Location:  OR     CV PERICARDIOCENTESIS N/A 2023    Procedure: Pericardiocentesis;  Surgeon: Barry Mckeon MD;  Location:  HEART CARDIAC CATH LAB     CV PERICARDIOCENTESIS N/A 10/11/2023    Procedure: Pericardiocentesis;  Surgeon: Ulises Bhakta MD;  Location:  HEART CARDIAC CATH LAB     CV RIGHT HEART CATH MEASUREMENTS RECORDED N/A 10/11/2023    Procedure: Right Heart Catheterization;  Surgeon: Ulises Bhakta MD;  Location:  HEART CARDIAC CATH LAB     HERNIA REPAIR       IR CHEST TUBE PLACEMENT NON-TUNNELED RIGHT  2022     IR CVC TUNNEL PLACEMENT > 5 YRS OF AGE  2022     IR CVC TUNNEL PLACEMENT > 5 YRS OF AGE  1/3/2024     IR DIALYSIS FISTULOGRAM LEFT  2023     IR GASTROSTOMY TUBE CHANGE  2023     IR GASTROSTOMY TUBE PERCUTANEOUS PLCMNT  2022     IR PARACENTESIS  2022     IR PARACENTESIS  2022     IR PARACENTESIS  2/10/2016     IR PARACENTESIS  2016     IR THORACENTESIS  2022     IR THORACENTESIS  2020     IR THORACENTESIS  08/10/2020     IR TRANSCATHETER BIOPSY  2022     REVISION FISTULA ARTERIOVENOUS UPPER EXTREMITY Left 8/15/2023    Procedure: 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 2; OUTFLOW REVISION CEPHALIC TO BRACHIAL VEIN;  Surgeon: Deejay Mcneil MD;  Location:  OR     REVISION FISTULA ARTERIOVENOUS UPPER EXTREMITY Left 1/3/2024    Procedure: Excision and repair of LEFT brachiocephalic arteriovenous fistula and vein patch angioplasty;  Surgeon: Deejay Mcneil MD;  Location:  OR     TRACHEOSTOMY N/A 2022    Procedure: TRACHEOSTOMY;  Surgeon: Nilesh Jackson MD;  Location:  OR     TRANSPLANT LIVER RECIPIENT  DONOR N/A 2016    Procedure: TRANSPLANT LIVER RECIPIENT  DONOR;  Surgeon: Enoc Crews MD;  Location: [facility] OR     Family History   Problem Relation Age of Onset     Coronary  Artery Disease No family hx of      Cardiomyopathy No family hx of      Social History     Socioeconomic History     Marital status:      Spouse name: Not on file     Number of children: Not on file     Years of education: Not on file     Highest education level: Not on file   Occupational History     Not on file   Tobacco Use     Smoking status: Never     Smokeless tobacco: Never   Vaping Use     Vaping Use: Never used   Substance and Sexual Activity     Alcohol use: Not Currently     Alcohol/week: 0.0 standard drinks of alcohol     Drug use: No     Sexual activity: Not Currently   Other Topics Concern     Parent/sibling w/ CABG, MI or angioplasty before 65F 55M? Not Asked   Social History Narrative     Not on file     Social Determinants of Health     Financial Resource Strain: Low Risk  (11/2/2023)    Financial Resource Strain      Within the past 12 months, have you or your family members you live with been unable to get utilities (heat, electricity) when it was really needed?: No   Food Insecurity: Low Risk  (11/2/2023)    Food Insecurity      Within the past 12 months, did you worry that your food would run out before you got money to buy more?: No      Within the past 12 months, did the food you bought just not last and you didn t have money to get more?: No   Transportation Needs: Low Risk  (11/2/2023)    Transportation Needs      Within the past 12 months, has lack of transportation kept you from medical appointments, getting your medicines, non-medical meetings or appointments, work, or from getting things that you need?: No   Physical Activity: Not on file   Stress: Not on file   Social Connections: Not on file   Interpersonal Safety: Low Risk  (9/21/2023)    Interpersonal Safety      Do you feel physically and emotionally safe where you currently live?: Yes      Within the past 12 months, have you been hit, slapped, kicked or otherwise physically hurt by someone?: No      Within the past 12  months, have you been humiliated or emotionally abused in other ways by your partner or ex-partner?: No   Housing Stability: Low Risk  (11/2/2023)    Housing Stability      Do you have housing? : Yes      Are you worried about losing your housing?: No       ROS:   CONSTITUTIONAL:  No fevers or chills  EYES: negative for icterus  ENT:  negative for hearing loss, tinnitus and sore throat  RESPIRATORY:  negative for cough, sputum, dyspnea  CARDIOVASCULAR:  negative for chest pain. Positive for Fatigue  Renal Insufficiency  GASTROINTESTINAL:  negative for nausea, vomiting, diarrhea or constipation  GENITOURINARY:  negative for incontinence, dysuria, bladder emptying problems  HEME:  No easy bruising  INTEGUMENT:  negative for rash and pruritus  NEURO:  Negative for headache, seizure disorder  Allergies:   No Known Allergies  Medications:  Prescription Medications as of 1/11/2024         Rx Number Disp Refills Start End Last Dispensed Date Next Fill Date Owning Pharmacy    acetaminophen (TYLENOL) 325 MG tablet  -- -- 10/30/2023 --       Sig: Take 2 tablets (650 mg) by mouth every 8 hours as needed for other (For optimal non-opioid multimodal pain management to improve pain control.)    Class: Historical    Route: Oral    ciprofloxacin (CIPRO) 250 MG tablet  90 tablet 0 10/31/2023 1/29/2024   [facility] #[id number] - [place], [place] - [address] AT [facility] [address]    Sig: Take 1 tablet (250 mg) by mouth every 24 hours for 90 days    Class: E-Prescribe    Route: Oral    Renewals       Renewal requests to authorizing provider (Deejay Yoon MD) <b>prohibited</b>            ELIQUIS ANTICOAGULANT 5 MG tablet  -- -- 11/2/2023 --       Sig: Take 5 mg by mouth 2 times daily    Class: Historical    Route: Oral    ferrous sulfate (FEROSUL) 325 (65 Fe) MG tablet  180 tablet 0 1/4/2024 7/2/2024   [facility]  [place] - [address] [address]    Sig: Take 1 tablet (325 mg) by mouth daily for 180 days     Class: E-Prescribe    Route: Oral    gabapentin (NEURONTIN) 100 MG capsule  30 capsule 2 11/1/2023 --   [id number], [place] - [address] AT [facility] [address]    Sig: Take 3 capsules (300 mg) by mouth at bedtime    Class: E-Prescribe    Route: Oral    hydrOXYzine (ATARAX) 25 MG tablet  90 tablet 0 8/4/2023 --   [facility] #[id number] [place] [address], [place] - [address] AT [facility] [address]    Sig: Take 1 tablet (25 mg) by mouth 3 times daily as needed for itching    Class: E-Prescribe    Route: Oral    Lacosamide (VIMPAT) 100 MG TABS tablet  31 tablet 5 9/5/2023 --   [id number], [place] - [address] AT [facility] [address]    Sig: Take 1 tablet (100 mg) by mouth every evening    Class: E-Prescribe    Route: Oral    lacosamide (VIMPAT) 50 MG TABS tablet  93 tablet 3 12/8/2023 --   [facility] #[id number] [place] [address], [place] - [address] AT [facility] [address]    Sig: Take 1 tablet (50 mg) by mouth every morning    Class: E-Prescribe    Route: Oral    melatonin 3 MG tablet  90 tablet 1 8/4/2023 --   [id number], [place] - [address] AT [facility] [address]    Sig: Take 1 tablet (3 mg) by mouth At Bedtime    Class: E-Prescribe    Route: Oral    midodrine (PROAMATINE) 10 MG tablet  450 tablet 1 12/28/2023 --   [facility] #[id number] [facility], [place] - [address] AT [facility] [address]    Sig: TAKE 1 TABLET 3 X DAILY. ON DIALYSIS DAYS(M, W, F) TAKE 2 EXTRA TABLETS 1 HOUR BEFORE, 1 TABLET WHEN YOU ARRIVE, AND 1 TABLET DURING DIALYSIS    Class: E-Prescribe    Notes to Pharmacy: **Patient requests 90 days supply**    Renewals       Renewal provider: Lanny Daly MD            multivitamin RENAL (TRIPHROCAPS) 1 capsule capsule  30 capsule 3 11/7/2023 --   [facility] #[id number] - [place], [place] - [address] AT [facility] [address]    Sig: Take 1 capsule by mouth daily    Class: E-Prescribe    Route: Oral    oxyCODONE  (ROXICODONE) 5 MG tablet  12 tablet 0 1/9/2024 --   [facility] [place], [place] - [address] [address]    Sig: Take 1 tablet (5 mg) by mouth every 6 hours as needed for severe pain    Class: E-Prescribe    Earliest Fill Date: 1/9/2024    Route: Oral    sevelamer carbonate (RENVELA) 800 MG tablet  -- --  --       Sig: Take 800 mg by mouth 4 times daily With meals and snacks    Class: Historical    Route: Oral    tacrolimus (GENERIC) 0.5 MG capsule  180 capsule 0 11/1/2023 1/30/2024   [facility] #[id number] - [place], [place] - [address] AT [facility] [address]    Sig: Take 1 capsule (0.5 mg) by mouth 2 times daily for 90 days    Class: E-Prescribe    Route: Oral    Renewals       Renewal requests to authorizing provider (Deejay Yoon MD) <b>prohibited</b>                  Exam:     There were no vitals taken for this visit.  Appearance: in no apparent distress.   Skin: normal  Eyes:  no redness or discharge.  Sclera anicteric  Head and Neck: Normal, no rashes or jaundice  Respiratory: easy respirations, no audible wheezing.  Abdomen: rounded, No distention   Extremities: femoral 2+/2+, Edema, none  Neuro: without deficit   Psychiatric: Normal mood and affect    Diagnostics:   Recent Results (from the past 672 hour(s))   Basic metabolic panel    Collection Time: 12/18/23  2:36 PM   Result Value Ref Range    Sodium 138 135 - 145 mmol/L    Potassium 4.3 3.4 - 5.3 mmol/L    Chloride 92 (L) 98 - 107 mmol/L    Carbon Dioxide (CO2) 32 (H) 22 - 29 mmol/L    Anion Gap 14 7 - 15 mmol/L    Urea Nitrogen 39.2 (H) 8.0 - 23.0 mg/dL    Creatinine 5.13 (H) 0.67 - 1.17 mg/dL    GFR Estimate 12 (L) >60 mL/min/1.73m2    Calcium 9.6 8.6 - 10.0 mg/dL    Glucose 89 70 - 99 mg/dL   Procalcitonin    Collection Time: 12/18/23  2:36 PM   Result Value Ref Range    Procalcitonin 0.81 (H) <0.50 ng/mL   CRP inflammation    Collection Time: 12/18/23  2:36 PM   Result Value Ref Range    CRP Inflammation 11.65 (H) <5.00 mg/L    CBC with platelets and differential    Collection Time: 12/18/23  2:36 PM   Result Value Ref Range    WBC Count 4.7 4.0 - 11.0 10e3/uL    RBC Count 3.84 (L) 4.40 - 5.90 10e6/uL    Hemoglobin 11.6 (L) 13.3 - 17.7 g/dL    Hematocrit 37.5 (L) 40.0 - 53.0 %    MCV 98 78 - 100 fL    MCH 30.2 26.5 - 33.0 pg    MCHC 30.9 (L) 31.5 - 36.5 g/dL    RDW 14.2 10.0 - 15.0 %    Platelet Count 110 (L) 150 - 450 10e3/uL    % Neutrophils 52 %    % Lymphocytes 30 %    % Monocytes 12 %    % Eosinophils 6 %    % Basophils 1 %    % Immature Granulocytes 0 %    Absolute Neutrophils 2.4 1.6 - 8.3 10e3/uL    Absolute Lymphocytes 1.4 0.8 - 5.3 10e3/uL    Absolute Monocytes 0.5 0.0 - 1.3 10e3/uL    Absolute Eosinophils 0.3 0.0 - 0.7 10e3/uL    Absolute Basophils 0.0 0.0 - 0.2 10e3/uL    Absolute Immature Granulocytes 0.0 <=0.4 10e3/uL   Hepatic panel    Collection Time: 12/18/23  2:36 PM   Result Value Ref Range    Protein Total 8.3 6.4 - 8.3 g/dL    Albumin 4.4 3.5 - 5.2 g/dL    Bilirubin Total 0.6 <=1.2 mg/dL    Alkaline Phosphatase 210 (H) 40 - 150 U/L    AST 24 0 - 45 U/L    ALT 13 0 - 70 U/L    Bilirubin Direct 0.25 0.00 - 0.30 mg/dL   Basic metabolic panel    Collection Time: 12/31/23  1:49 AM   Result Value Ref Range    Sodium 136 135 - 145 mmol/L    Potassium 4.2 3.4 - 5.3 mmol/L    Chloride 90 (L) 98 - 107 mmol/L    Carbon Dioxide (CO2) 25 22 - 29 mmol/L    Anion Gap 21 (H) 7 - 15 mmol/L    Urea Nitrogen 100.4 (H) 8.0 - 23.0 mg/dL    Creatinine 10.60 (H) 0.67 - 1.17 mg/dL    GFR Estimate 5 (L) >60 mL/min/1.73m2    Calcium 9.3 8.6 - 10.0 mg/dL    Glucose 105 (H) 70 - 99 mg/dL   Extra Purple Top Tube    Collection Time: 12/31/23  1:49 AM   Result Value Ref Range    Hold Specimen JI    EKG 12 lead    Collection Time: 12/31/23  2:40 AM   Result Value Ref Range    Systolic Blood Pressure  mmHg    Diastolic Blood Pressure  mmHg    Ventricular Rate 68 BPM    Atrial Rate 68 BPM    CA Interval 202 ms    QRS Duration 90 ms     ms     QTc 463 ms    P Axis 55 degrees    R AXIS 28 degrees    T Axis 36 degrees    Interpretation ECG       Sinus rhythm  Normal ECG  When compared with ECG of 30-OCT-2023 06:41,  Minimal criteria for Anterior infarct are no longer Present  T wave amplitude has increased in Anterior leads  QT has lengthened  Confirmed by GENERATED REPORT, COMPUTER ([id number]),  Aasen, Bradley ([id number]) on 12/31/2023 3:52:32 AM     EKG 12-lead, tracing only    Collection Time: 01/01/24  8:07 AM   Result Value Ref Range    Systolic Blood Pressure  mmHg    Diastolic Blood Pressure  mmHg    Ventricular Rate 64 BPM    Atrial Rate 64 BPM    ND Interval 198 ms    QRS Duration 88 ms     ms    QTc 466 ms    P Axis 50 degrees    R AXIS 46 degrees    T Axis 21 degrees    Interpretation ECG       Sinus rhythm  Low voltage QRS  Cannot rule out Anterior infarct , age undetermined  Abnormal ECG  When compared with ECG of 31-DEC-2023 02:40,  No significant change was found  Confirmed by GENERATED REPORT, COMPUTER ([id number]),  Chelsea Chand ([id number]) on 1/1/2024 8:29:29 AM     Comprehensive metabolic panel    Collection Time: 01/01/24  8:13 AM   Result Value Ref Range    Sodium 135 135 - 145 mmol/L    Potassium 4.7 3.4 - 5.3 mmol/L    Carbon Dioxide (CO2) 24 22 - 29 mmol/L    Anion Gap 20 (H) 7 - 15 mmol/L    Urea Nitrogen 124.5 (H) 8.0 - 23.0 mg/dL    Creatinine 11.96 (H) 0.67 - 1.17 mg/dL    GFR Estimate 4 (L) >60 mL/min/1.73m2    Calcium 9.1 8.6 - 10.0 mg/dL    Chloride 91 (L) 98 - 107 mmol/L    Glucose 90 70 - 99 mg/dL    Alkaline Phosphatase 178 (H) 40 - 150 U/L    AST 17 0 - 45 U/L    ALT 10 0 - 70 U/L    Protein Total 7.4 6.4 - 8.3 g/dL    Albumin 4.0 3.5 - 5.2 g/dL    Bilirubin Total 0.4 <=1.2 mg/dL   Blood Culture Peripheral Blood    Collection Time: 01/01/24  8:13 AM    Specimen: Peripheral Blood   Result Value Ref Range    Culture No Growth    Blood Culture Peripheral Blood    Collection Time: 01/01/24  8:13 AM    Specimen: Peripheral  Blood   Result Value Ref Range    Culture No Growth    CBC with platelets and differential    Collection Time: 01/01/24  8:13 AM   Result Value Ref Range    WBC Count 3.5 (L) 4.0 - 11.0 10e3/uL    RBC Count 2.86 (L) 4.40 - 5.90 10e6/uL    Hemoglobin 8.6 (L) 13.3 - 17.7 g/dL    Hematocrit 26.4 (L) 40.0 - 53.0 %    MCV 92 78 - 100 fL    MCH 30.1 26.5 - 33.0 pg    MCHC 32.6 31.5 - 36.5 g/dL    RDW 14.6 10.0 - 15.0 %    Platelet Count 63 (L) 150 - 450 10e3/uL    % Neutrophils 52 %    % Lymphocytes 27 %    % Monocytes 14 %    % Eosinophils 6 %    % Basophils 1 %    % Immature Granulocytes 0 %    NRBCs per 100 WBC 0 <1 /100    Absolute Neutrophils 1.9 1.6 - 8.3 10e3/uL    Absolute Lymphocytes 1.0 0.8 - 5.3 10e3/uL    Absolute Monocytes 0.5 0.0 - 1.3 10e3/uL    Absolute Eosinophils 0.2 0.0 - 0.7 10e3/uL    Absolute Basophils 0.0 0.0 - 0.2 10e3/uL    Absolute Immature Granulocytes 0.0 <=0.4 10e3/uL    Absolute NRBCs 0.0 10e3/uL   Vitamin B12    Collection Time: 01/02/24 11:27 PM   Result Value Ref Range    Vitamin B12 948 232 - 1,245 pg/mL   Folate    Collection Time: 01/02/24 11:27 PM   Result Value Ref Range    Folic Acid 27.6 4.6 - 34.8 ng/mL   Comprehensive metabolic panel    Collection Time: 01/02/24 11:27 PM   Result Value Ref Range    Sodium 138 135 - 145 mmol/L    Potassium 4.5 3.4 - 5.3 mmol/L    Carbon Dioxide (CO2) 31 (H) 22 - 29 mmol/L    Anion Gap 14 7 - 15 mmol/L    Urea Nitrogen 47.0 (H) 8.0 - 23.0 mg/dL    Creatinine 5.73 (H) 0.67 - 1.17 mg/dL    GFR Estimate 11 (L) >60 mL/min/1.73m2    Calcium 9.3 8.6 - 10.0 mg/dL    Chloride 93 (L) 98 - 107 mmol/L    Glucose 93 70 - 99 mg/dL    Alkaline Phosphatase 179 (H) 40 - 150 U/L    AST 22 0 - 45 U/L    ALT 12 0 - 70 U/L    Protein Total 7.5 6.4 - 8.3 g/dL    Albumin 4.2 3.5 - 5.2 g/dL    Bilirubin Total 0.8 <=1.2 mg/dL   Bld morphology pathology review    Collection Time: 01/02/24 11:27 PM   Result Value Ref Range    Final Diagnosis       Peripheral blood,  morphology:  - Marked anemia, normocytic and normochromic.  - Moderate thrombocytopenia; no abnormal platelet clumping seen on the slide preparations.  - WBC subsets quantitatively within normal limits and without diagnostic morphologic abnormality.  - Negative for schistocytes and definitive morphologic features of hemolysis.   - Negative for overt features of myelodysplasia and circulating blasts.    See comment.    COMMENT:  Normocytic anemia is nonspecific and may be attributable to multiple overlapping etiologies, including chronic disease, renal and/or endocrine disease, blood loss, iron deficiency (in some patients), and/or bone marrow suppression (by underlying infection, various nutritional deficiencies, toxicities, alcohol use, or medications).    Thrombocytopenia is nonspecific and possible etiologies may include immune-associated platelet destruction (such as idiopathic thrombocytopenic purpura [ITP], heparin-induced thrombocytopenia [HIT], post-transfusion purpura, or drug-induced antibodies), non-immune-associated platelet destruction (such as recent thrombosis or thrombotic microangiopathies), decreased platelet production (such as vitamin B12 or folate deficiency, underlying infection, myelodysplasia and other hematologic malignancies, aplastic anemia, or bone marrow suppression [by alcohol, medications, chemotherapy, radiation, or marrow infiltrative processes]), medications (including immunosuppressive agents), splenic sequestration of platelets, or liver disease.  Clinical correlation is recommended.      Peripheral Smear       A microscopic examination is performed.       Peripheral Hematologic Data        Latest Reference Range & Units 01/02/24 23:27   WBC 4.0 - 11.0 10e3/uL 4.0   Hemoglobin 13.3 - 17.7 g/dL 8.7 (L)   Hematocrit 40.0 - 53.0 % 26.6 (L)   Platelet Count 150 - 450 10e3/uL 78 (L)   RBC Count 4.40 - 5.90 10e6/uL 2.83 (L)   MCV 78 - 100 fL 94   MCH 26.5 - 33.0 pg 30.7   MCHC 31.5 -  36.5 g/dL 32.7   RDW 10.0 - 15.0 % 14.7   % Neutrophils % 43   % Lymphocytes % 32   % Monocytes % 17   % Eosinophils % 7   % Basophils % 1   Absolute Basophils 0.0 - 0.2 10e3/uL 0.0   Absolute Eosinophils 0.0 - 0.7 10e3/uL 0.3   Absolute Immature Granulocytes <=0.4 10e3/uL 0.0   Absolute Lymphocytes 0.8 - 5.3 10e3/uL 1.3   Absolute Monocytes 0.0 - 1.3 10e3/uL 0.7   % Immature Granulocytes % 0   Absolute Neutrophils 1.6 - 8.3 10e3/uL 1.8   Absolute NRBCs 10e3/uL 0.0   NRBCs per 100 WBC <1 /100 0   % Retic 0.5 - 2.0 % 1.2   Absolute Retic 0.025 - 0.095 10e6/uL 0.035   (L): Data is abnormally low    For patients over 18 years old, anemia and quantitative abnormalities of WBC and platelets (if present) may be further stratified as follows:    WBC (10^9/L):    Greater than 50.0: Marked leukocytosis    18.0 - 50.0: Moderate leukocytosis    11.1 - 17.9: Mild leukocytosis    3.0 - 3.9: Mild leukopenia    2.0 - 2.9: Moderate leukopenia    Less than 2.0: Marked leukopenia    Hemoglobin (g/dL) in males:    11.3 - 13.2: Mild anemia    9.3 - 11.2: Moderate anemia    Less than 9.3: Marked anemia    Platelets (10^9/L):    Greater than 900: Marked thrombocytosis    601 - 900: Moderate thrombocytosis    451 - 600: Mild thrombocytosis    100 - 149: Mild thrombocytopenia    50 - 99: Moderate thrombocytopenia    Less than 50: Marked thrombocytopenia       Performing Labs       The technical component of this testing was completed at [facility], [facility] and [facility]     CBC with platelets and differential    Collection Time: 01/02/24 11:27 PM   Result Value Ref Range    WBC Count 4.0 4.0 - 11.0 10e3/uL    RBC Count 2.83 (L) 4.40 - 5.90 10e6/uL    Hemoglobin 8.7 (L) 13.3 - 17.7 g/dL    Hematocrit 26.6 (L) 40.0 - 53.0 %    MCV 94 78 - 100 fL    MCH 30.7 26.5 - 33.0 pg    MCHC 32.7 31.5 - 36.5 g/dL    RDW 14.7 10.0 - 15.0 %    Platelet Count  78 (L) 150 - 450 10e3/uL    % Neutrophils 43 %    % Lymphocytes 32 %    % Monocytes 17 %    % Eosinophils 7 %    % Basophils 1 %    % Immature Granulocytes 0 %    NRBCs per 100 WBC 0 <1 /100    Absolute Neutrophils 1.8 1.6 - 8.3 10e3/uL    Absolute Lymphocytes 1.3 0.8 - 5.3 10e3/uL    Absolute Monocytes 0.7 0.0 - 1.3 10e3/uL    Absolute Eosinophils 0.3 0.0 - 0.7 10e3/uL    Absolute Basophils 0.0 0.0 - 0.2 10e3/uL    Absolute Immature Granulocytes 0.0 <=0.4 10e3/uL    Absolute NRBCs 0.0 10e3/uL   Reticulocyte count    Collection Time: 01/02/24 11:27 PM   Result Value Ref Range    % Reticulocyte 1.2 0.5 - 2.0 %    Absolute Reticulocyte 0.035 0.025 - 0.095 10e6/uL   Basic metabolic panel    Collection Time: 01/03/24  7:33 AM   Result Value Ref Range    Sodium 134 (L) 135 - 145 mmol/L    Potassium 4.4 3.4 - 5.3 mmol/L    Chloride 91 (L) 98 - 107 mmol/L    Carbon Dioxide (CO2) 32 (H) 22 - 29 mmol/L    Anion Gap 11 7 - 15 mmol/L    Urea Nitrogen 54.6 (H) 8.0 - 23.0 mg/dL    Creatinine 6.34 (H) 0.67 - 1.17 mg/dL    GFR Estimate 9 (L) >60 mL/min/1.73m2    Calcium 9.7 8.6 - 10.0 mg/dL    Glucose 88 70 - 99 mg/dL   CBC with platelets and differential    Collection Time: 01/03/24  7:33 AM   Result Value Ref Range    WBC Count 3.8 (L) 4.0 - 11.0 10e3/uL    RBC Count 2.88 (L) 4.40 - 5.90 10e6/uL    Hemoglobin 8.6 (L) 13.3 - 17.7 g/dL    Hematocrit 27.4 (L) 40.0 - 53.0 %    MCV 95 78 - 100 fL    MCH 29.9 26.5 - 33.0 pg    MCHC 31.4 (L) 31.5 - 36.5 g/dL    RDW 14.8 10.0 - 15.0 %    Platelet Count 76 (L) 150 - 450 10e3/uL    % Neutrophils 39 %    % Lymphocytes 37 %    % Monocytes 15 %    % Eosinophils 8 %    % Basophils 1 %    % Immature Granulocytes 0 %    NRBCs per 100 WBC 0 <1 /100    Absolute Neutrophils 1.5 (L) 1.6 - 8.3 10e3/uL    Absolute Lymphocytes 1.4 0.8 - 5.3 10e3/uL    Absolute Monocytes 0.6 0.0 - 1.3 10e3/uL    Absolute Eosinophils 0.3 0.0 - 0.7 10e3/uL    Absolute Basophils 0.0 0.0 - 0.2 10e3/uL    Absolute Immature  Granulocytes 0.0 <=0.4 10e3/uL    Absolute NRBCs 0.0 10e3/uL   Adult Type and Screen    Collection Time: 01/03/24  7:33 AM   Result Value Ref Range    ABO/RH(D) O POS     Antibody Screen Negative Negative    SPECIMEN EXPIRATION DATE [id number]    Hepatitis B surface antigen    Collection Time: 01/03/24  7:33 AM   Result Value Ref Range    Hepatitis B Surface Antigen Nonreactive Nonreactive   Hemoglobin and hematocrit    Collection Time: 01/04/24 11:45 AM   Result Value Ref Range    Hemoglobin 7.0 (L) 13.3 - 17.7 g/dL    Hematocrit 21.2 (L) 40.0 - 53.0 %   Hepatitis B Surface Antibody    Collection Time: 01/04/24  3:16 PM   Result Value Ref Range    Hepatitis B Surface Antibody Nonreactive     Hepatitis B Surface Antibody Instrument Value <3.50 <8.5 m[IU]/mL   Hemoglobin and hematocrit    Collection Time: 01/04/24  6:08 PM   Result Value Ref Range    Hemoglobin 6.7 (LL) 13.3 - 17.7 g/dL    Hematocrit 20.3 (L) 40.0 - 53.0 %   CONDITIONAL Prepare red blood cells (unit)    Collection Time: 01/04/24  6:35 PM   Result Value Ref Range    Blood Component Type Red Blood Cells     Product Code E2547U28     Unit Status Transfused     Unit Number [id number]     CROSSMATCH Compatible     CODING SYSTEM [id number]     ISSUE DATE AND TIME [id number]     UNIT ABO/RH O+     UNIT TYPE ISBT 5100    Hemoglobin and hematocrit    Collection Time: 01/05/24  1:33 AM   Result Value Ref Range    Hemoglobin 7.3 (L) 13.3 - 17.7 g/dL    Hematocrit 21.7 (L) 40.0 - 53.0 %   Hemoglobin and hematocrit    Collection Time: 01/05/24  8:24 AM   Result Value Ref Range    Hemoglobin 7.0 (L) 13.3 - 17.7 g/dL    Hematocrit 21.1 (L) 40.0 - 53.0 %   CONDITIONAL Prepare red blood cells (unit)    Collection Time: 01/05/24 12:59 PM   Result Value Ref Range    Blood Component Type Red Blood Cells     Product Code G6862R68     Unit Status Transfused     Unit Number [id number]     CROSSMATCH Compatible     CODING SYSTEM [id number]     ISSUE DATE AND TIME  "[id number]     UNIT ABO/RH O+     UNIT TYPE ISBT 5100    Hemoglobin    Collection Time: 01/06/24 12:03 AM   Result Value Ref Range    Hemoglobin 7.7 (L) 13.3 - 17.7 g/dL   Glucose by meter    Collection Time: 01/06/24  9:01 AM   Result Value Ref Range    GLUCOSE BY METER POCT 142 (H) 70 - 99 mg/dL   CBC with platelets    Collection Time: 01/06/24  9:15 AM   Result Value Ref Range    WBC Count 5.2 4.0 - 11.0 10e3/uL    RBC Count 2.54 (L) 4.40 - 5.90 10e6/uL    Hemoglobin 7.8 (L) 13.3 - 17.7 g/dL    Hematocrit 23.9 (L) 40.0 - 53.0 %    MCV 94 78 - 100 fL    MCH 30.7 26.5 - 33.0 pg    MCHC 32.6 31.5 - 36.5 g/dL    RDW 15.5 (H) 10.0 - 15.0 %    Platelet Count 79 (L) 150 - 450 10e3/uL   Comprehensive metabolic panel    Collection Time: 01/06/24  9:15 AM   Result Value Ref Range    Sodium 127 (L) 135 - 145 mmol/L    Potassium 5.1 3.4 - 5.3 mmol/L    Carbon Dioxide (CO2) 29 22 - 29 mmol/L    Anion Gap 8 7 - 15 mmol/L    Urea Nitrogen 55.5 (H) 8.0 - 23.0 mg/dL    Creatinine 6.30 (H) 0.67 - 1.17 mg/dL    GFR Estimate 10 (L) >60 mL/min/1.73m2    Calcium 8.8 8.6 - 10.0 mg/dL    Chloride 90 (L) 98 - 107 mmol/L    Glucose 110 (H) 70 - 99 mg/dL    Alkaline Phosphatase 209 (H) 40 - 150 U/L    AST 23 0 - 45 U/L    ALT <5 0 - 70 U/L    Protein Total 6.9 6.4 - 8.3 g/dL    Albumin 3.5 3.5 - 5.2 g/dL    Bilirubin Total 1.2 <=1.2 mg/dL     No results found for: \"CPRA\"       Again, thank you for allowing me to participate in the care of your patient.        Sincerely,        Laura Parson NP  "

## 2024-01-11 NOTE — LETTER
1/11/2024         RE: Blanco Osborne  [address]  [facility] [address]        Dear Colleague,    Thank you for referring your patient, Blanco Osborne, to the [facility]. Please see a copy of my visit note below.    Psychosocial Assessment For kidney Transplantation  Patient Name/ Age: Blanco Osborne 58 year old   Medical Record #: [id number]  Duration of Interview:     30 min  Process:   Face-to-Face Interview                (counseling < 50%)   Present at Appointment: Blanco and wife, Ofe        : JAMISON Olson LICSW Date:  January 12, 2024        Type of transplant: kidney    Donor type:      friend   Prior Transplants:    Yes  Status of Transplant:   Liver transplant recipient 2016        Current Living Situation    Location:   [address] [place] [address] [address]  [facility] [address]  With Whom: lives with their spouse       Family/ Social Support:    Sister, brother in [place]  Son in [place], Dtr in [place] available, helpful   Committed Relationship:    Pt has been with Ofe for many years.   He does not consider his ex-wife to be of support.    Stable/Supportive   Other Supports:     Friends, neighbors   available, helpful       Activities/ Functional Ability    Current Level: ambulatory and independent with ADL's     Transportation drives self       Vocational/Employment/Financial     Employment   part time   Job Description   [employer]   Income   Salary/wages and other: investments, wife's [employer]   Insurance. Pt has [employer] Medicare Advantage Plan. He reports having part A, B and having an out of pocket cost of 5,000. He reports having separate coverage for prescriptions.       At this time, patient can afford medication costs:  Yes  Medicare       Medical Status    Current Mode of Treatment for ESRD Dialysis   Complications None       Behavioral    Tobacco Use No Chemical Dependency No         Psychiatric Impairment  No      Reading Ability: Good  Education Level: Masters Degree Recent Legal History No      Coping Style/Strategies: socializing, exercise       Ability to Adhere to Complex Medical Regime: Yes     Adherence History: Pt takes medication as prescribed, attends outpatient appointments as scheduled and is compliant with recommendations of outpatient providers.        Education  _X_ Medicare  _X_ Rehabilitation  _X_ Donor issues  _X_ Community resources  _X_ Post discharge housing  _X_ Financial resources  _X_ Medical insurance options  _X_ Psych adjustment  _X_ Family adjustment  _X_ Health Care Directive Provided Education.   Psychosocial Risks of Transplant Reviewed and Discussed:  _X_ Increased stress related to emotional,            family, social, employment or financial           situation  _X_ Effect on work and/or disability benefits  _X_ Effect on future health and life           insurance  _X_ Transplant outcome expectations may           not be met  _X_ Mental Health Risks: anxiety,           depression, PTSD, guilt, grief and           chronic fatigue     Notable Items:   None noted.       Final Evaluation/Assessment   Patient seemed to process information well. Appeared well informed, motivated and able to follow post transplant requirements. Behavior was appropriate during interview. Has adequate income and insurance coverage. Adequate social support. No major contraindications noted for transplant.  At this time patient appears to understand the risks and benefits of transplant.      Recommendation  Acceptable    Selection Criteria Met:  Plan for support Yes   Chemical Dependence Yes   Smoking Yes   Mental Health Yes   Adequate Finances Yes    Signature: JAMISON Olson [facility]   Title: Clinical      JAMISON Olson, [facility]  Clinical   Outpatient kidney/pancreas/auto islet transplant program  Ph: [phone]      Again, thank you for allowing me  to participate in the care of your patient.        Sincerely,        JAMISON Olson

## 2024-01-11 NOTE — TELEPHONE ENCOUNTER
Spoke with patient's wife.  She said she did not get a call back about scheduling a post-op appointment for her  and to remove the stitches.    Informed her of the scheduled post-op appointment date.    Future Appointments   Date Time Provider Department Center   1/23/2024 10:30 AM Saima Coker NP [facility]

## 2024-01-11 NOTE — PROGRESS NOTES
Transplant Surgery Consult Note     Medical record number: [id number]  YOB: 1965,   Consult requested  for evaluation of kidney transplant candidacy.    Assessment and Recommendations:Mr. Osborne appears to be a good candidate for kidney transplantation and has a fair understanding of the risks and benefits of this approach to the management of renal failure. The following issues should be addressed prior to finalizing his transplant candidacy:     Transplant order: Mr. Osborne has End stage renal failure due to from acute renal failure post arrest : had stable renal function with creatinine of 0.9-1.2 till 2020 and admitted in November 2022 after out side hospital cardiac arrest followed by hypoxic respiratory failure with bacterial and fungal pneumonia  whose condition is not expected to resolve, is expected to progress, and is expected to continue to develop related comorbid conditions.  Recommend he be considered as a candidate for kidney.  Cardiology consult for cardiac risk stratification to be ordered: Yes  CT abdomen and pelvis without contrast to be ordered for assessment of vascular targets: Yes  Transplant listing labs ordered to include HLA, ABOx2, Cr, etc.  Dietician consult ordered: Yes  Social work consult ordered: Yes  Imaging reports reviewed:  Needs CT  Radiology images reviewed:Needs CT  Recipient suitable to move forward with work up of living donors:  Yes  Needs cards clearance: hx of cardiac arrest times 3    Hx of a fib-on eliqus.  Needs cards clearance   Hx of sz.  Need neuro notes  Hx of liver txp.  Stable per hepatology  Appears to have some poor health literacy but relies on wife.   Needs living donor.   Hx of sepsis.  tracheotomy for prolonged respiratory failure, had PEG tube which were removed in mid 2023   hospitalization was complicated with stroke?- scans needed    The majority of our visit was spent in counselling, discussing the medical and surgical risks of kidney  transplantation. We discussed approximate wait time and how that is influenced by issues such as blood type and sensitization (PRA) and access to a living donor. I contrasted potential waiting time for living vs  donor kidneys from  normal (0-85%) or higher (%) kidney donor profile index (KDPI) donors and their associated outcomes. I would not recommend this individual to consider kidneys from high KDPI donors. The reason for this decision is best summarized as: multiple potential living donors. Potential surgical complications of kidney transplantation include bleeding, superficial or deep wound complications (infection, hernia, lymphocele), ureteral anastomotic failure (leak or stenosis), graft thrombosis, need for reoperation and other issues such as cardiac complications, pneumonia, deep venous thrombosis, pulmonary embolism, post transplant diabetes and death. The potential for recurrent disease or need for retransplantation was also addressed. We discussed the possible need for ureteral stent (and subsequent removal), and the utility of protocol biopsy and laboratory studies to evaluate for rejection or recurrent disease. We discussed the risk of graft rejection, our center's average graft and patient survival rates, immunosuppression protocols, as well as the potential opportunity to participate in clinical trials.  We also discussed the average length of stay, recovery process, and posttransplant lab and monitoring protocol.  I emphasized the need for strict immunosuppression medication adherence and the potential for complications of immunosuppression such as skin cancer or lymphoma, as well as a very low but not zero risk of donor-derived disease transmission risks (infection, cancer). Mr. Osborne asked good questions and his candidacy will be reviewed at our Multidisciplinary Selection Committee. Thank you for the opportunity to participate in Mr. Osborne's care.      Total time: 50  minutes  Counselling time: 40 minutes        ---------------------------------------------------------------------------------------------------    HPI: Mr. Osborne has End stage renal failure due to from acute renal failure post arrest : had stable renal function with creatinine of 0.9-1.2 till 2020 and admitted in November 2022 after out side hospital cardiac arrest followed by hypoxic respiratory failure with bacterial and fungal pneumonia . The patient is non-diabetic.       The patient is on dialysis.    Has potential kidney donors:  Yes .  Interested in participation in paired exchange if a donor is willing: Doesn't know     The patient has the following pertinent history:       No    Yes  Dialysis:    []      [x] via:   CVC line  Blood Transfusion                  []      [x]  Number of units:  2 Most recently:2014  Pregnancy:    [x]      [] Number:       Previous Transplant:  []      [x] Details:  liver txp 9/2016  Cancer    []      [x] Comment:   Kidney stones   [x]      [] Comment:      Recurrent infections  [x]      []  Type:                  Bladder dysfunction  []      [x] Cause:    Claudication   [x]      [] Distance:    Previous Amputation  [x]      [] Cause:     Chronic anticoagulation  []      [x] Indication: eliqus   [facility]  [x]      []      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    Cancer (H) 2023    Squamous Cell Cancer- Since resolved    Cirrhosis of liver with ascites (H) 02/11/2016    Coagulopathy (H24)     Critical illness myopathy     Dialysis patient (H24)     DIALYSIS 3X/ WEEK    Embolic stroke (H) 11/20/2022    Encephalopathy     ESRD (end stage renal disease) on dialysis (H)     Gastroesophageal reflux disease     H/O alcohol  abuse     History of blood transfusion     Hyperammonemia (H24)     Hyperlipidemia     Hypertension     Patients states that HTN has been resolved    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    Insomnia     Lactic acidosis 11/12/2022    Liver replaced by transplant (H) 09/20/2016    NAFLD (nonalcoholic fatty liver disease) 02/11/2016    CHRISTIAN on CPAP     Parainfluenza type 1 infection 11/19/2022    Parapneumonic effusion     Pleural effusion     Pneumothorax on left 12/16/2022    Respiratory acidosis 11/12/2022    Respiratory arrest (H) 11/12/2022    Restless legs syndrome (RLS)     SBP (spontaneous bacterial peritonitis) (H)     MNGI    Seizures (H) 12/2022    Sepsis due to Streptococcus pneumoniae with acute hypoxic respiratory failure (H) 11/19/2022    Stroke, embolic (H) 11/20/2022    Sudden cardiac arrest (H) 12/29/2022    PEA- 2 min of CPR    Tubular adenoma 10/2019    Large cecal adenoma- due for surveillance colonoscopy in 3 years (10/2022)     Past Surgical History:   Procedure Laterality Date    APPENDECTOMY      BENCH LIVER N/A 09/20/2016    Procedure: BENCH LIVER;  Surgeon: Enoc Crews MD;  Location: [facility] OR    BRONCHOSCOPY FLEXIBLE AND RIGID N/A 12/20/2022    Procedure: BRONCHOSCOPY;  Surgeon: Alena Valenzuela MD;  Location:  GI    COLONOSCOPY  08/06/2013    repeat in 2018    COLONOSCOPY N/A 10/04/2019    Procedure: COLONOSCOPY, WITH POLYPECTOMY AND BIOPSY;  Surgeon: Go Chong MD;  Location:  OR    CREATE FISTULA ARTERIOVENOUS UPPER EXTREMITY Left 03/21/2023    Procedure: LEFT UPPER EXTREMITY ARTERIOVENOUS FISTULA CREATION. LIGATION OF COMPETING VASCULAR BRANCHES- LEFT;  Surgeon: Deejay Mcneil MD;  Location:  OR    CV PERICARDIOCENTESIS N/A 9/29/2023    Procedure: Pericardiocentesis;  Surgeon: Barry Mckeon MD;  Location:  HEART CARDI[facility] CATH LAB    CV PERICARDIOCENTESIS N/A 10/11/2023    Procedure: Pericardiocentesis;  Surgeon: Ulises Bhakta  MD Keely;  Location:  HE[facility] CATH LAB    CV RIGHT HEART CATH MEASUREMENTS RECORDED N/A 10/11/2023    Procedure: Right Heart Catheterization;  Surgeon: Ulises Bhakta MD;  Location:  HEART CARDIAC CATH LAB    HERNIA REPAIR      IR CHEST TUBE PLACEMENT NON-TUNNELED RIGHT  2022    IR CVC TUNNEL PLACEMENT > 5 YRS OF AGE  2022    IR CVC TUNNEL PLACEMENT > 5 YRS OF AGE  1/3/2024    IR DIALYSIS FISTULOGRAM LEFT  2023    IR GASTROSTOMY TUBE CHANGE  2023    IR GASTROSTOMY TUBE PERCUTANEOUS PLCMNT  2022    IR PARACENTESIS  2022    IR PARACENTESIS  2022    IR PARACENTESIS  2/10/2016    IR PARACENTESIS  2016    IR THORACENTESIS  2022    IR THORACENTESIS  2020    IR THORACENTESIS  08/10/2020    IR TRANSCATHETER BIOPSY  2022    REVISION FISTULA ARTERIOVENOUS UPPER EXTREMITY Left 8/15/2023    Procedure: 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 2; OUTFLOW REVISION CEPHALIC TO BRACHIAL VEIN;  Surgeon: Deejay Mcneil MD;  Location:  OR    REVISION FISTULA ARTERIOVENOUS UPPER EXTREMITY Left 1/3/2024    Procedure: Excision and repair of LEFT brachiocephalic arteriovenous fistula and vein patch angioplasty;  Surgeon: Deejay Mcneil MD;  Location:  OR    TRACHEOSTOMY N/A 2022    Procedure: TRACHEOSTOMY;  Surgeon: Nilesh Jackson MD;  Location:  OR    TRANSPLANT LIVER RECIPIENT  DONOR N/A 2016    Procedure: TRANSPLANT LIVER RECIPIENT  DONOR;  Surgeon: Enoc Crews MD;  [facility]     Family History   Problem Relation Age of Onset    Coronary Artery Disease No family hx of     Cardiomyopathy No family hx of      Social History     Socioeconomic History    Marital status:      Spouse name: Not on file    Number of children: Not on file    Years of education: Not on file    Highest education level: Not on file   Occupational History    Not on file   Tobacco Use    Smoking  status: Never    Smokeless tobacco: Never   Vaping Use    Vaping Use: Never used   Substance and Sexual Activity    Alcohol use: Not Currently     Alcohol/week: 0.0 standard drinks of alcohol    Drug use: No    Sexual activity: Not Currently   Other Topics Concern    Parent/sibling w/ CABG, MI or angioplasty before 65F 55M? Not Asked   Social History Narrative    Not on file     Social Determinants of Health     Financial Resource Strain: Low Risk  (11/2/2023)    Financial Resource Strain     Within the past 12 months, have you or your family members you live with been unable to get utilities (heat, electricity) when it was really needed?: No   Food Insecurity: Low Risk  (11/2/2023)    Food Insecurity     Within the past 12 months, did you worry that your food would run out before you got money to buy more?: No     Within the past 12 months, did the food you bought just not last and you didn t have money to get more?: No   Transportation Needs: Low Risk  (11/2/2023)    Transportation Needs     Within the past 12 months, has lack of transportation kept you from medical appointments, getting your medicines, non-medical meetings or appointments, work, or from getting things that you need?: No   Physical Activity: Not on file   Stress: Not on file   Social Connections: Not on file   Interpersonal Safety: Low Risk  (9/21/2023)    Interpersonal Safety     Do you feel physically and emotionally safe where you currently live?: Yes     Within the past 12 months, have you been hit, slapped, kicked or otherwise physically hurt by someone?: No     Within the past 12 months, have you been humiliated or emotionally abused in other ways by your partner or ex-partner?: No   Housing Stability: Low Risk  (11/2/2023)    Housing Stability     Do you have housing? : Yes     Are you worried about losing your housing?: No       ROS:   CONSTITUTIONAL:  No fevers or chills  EYES: negative for icterus  ENT:  negative for hearing loss,  tinnitus and sore throat  RESPIRATORY:  negative for cough, sputum, dyspnea  CARDIOVASCULAR:  negative for chest pain. Positive for Fatigue  Renal Insufficiency  GASTROINTESTINAL:  negative for nausea, vomiting, diarrhea or constipation  GENITOURINARY:  negative for incontinence, dysuria, bladder emptying problems  HEME:  No easy bruising  INTEGUMENT:  negative for rash and pruritus  NEURO:  Negative for headache, seizure disorder  Allergies:   No Known Allergies  Medications:  Prescription Medications as of 1/11/2024         Rx Number Disp Refills Start End Last Dispensed Date Next Fill Date Owning Pharmacy    acetaminophen (TYLENOL) 325 MG tablet  -- -- 10/30/2023 --       Sig: Take 2 tablets (650 mg) by mouth every 8 hours as needed for other (For optimal non-opioid multimodal pain management to improve pain control.)    Class: Historical    Route: Oral    ciprofloxacin (CIPRO) 250 MG tablet  90 tablet 0 10/31/2023 1/29/2024   [facility] #[id number] - [place], [place] - [address] AT [facility] [address]    Sig: Take 1 tablet (250 mg) by mouth every 24 hours for 90 days    Class: E-Prescribe    Route: Oral    Renewals       Renewal requests to authorizing provider (Deejay Yoon MD) <b>prohibited</b>            ELIQUIS ANTICOAGULANT 5 MG tablet  -- -- 11/2/2023 --       Sig: Take 5 mg by mouth 2 times daily    Class: Historical    Route: Oral    ferrous sulfate (FEROSUL) 325 (65 Fe) MG tablet  180 tablet 0 1/4/2024 7/2/2024   [facility], [place] - [address] [place] [address]    Sig: Take 1 tablet (325 mg) by mouth daily for 180 days    Class: E-Prescribe    Route: Oral    gabapentin (NEURONTIN) 100 MG capsule  30 capsule 2 11/1/2023 --   [facility] #[id number] - [place], [place] - [address] AT [facility] [address]    Sig: Take 3 capsules (300 mg) by mouth at bedtime    Class: E-Prescribe    Route: Oral    hydrOXYzine (ATARAX) 25 MG tablet  90 tablet 0 8/4/2023 --   [facility]  #[id number] [facility], [place] - [address] AT [facility] [address]    Sig: Take 1 tablet (25 mg) by mouth 3 times daily as needed for itching    Class: E-Prescribe    Route: Oral    Lacosamide (VIMPAT) 100 MG TABS tablet  31 tablet 5 9/5/2023 --   [id number], [place] - [address] AT [facility] [address]    Sig: Take 1 tablet (100 mg) by mouth every evening    Class: E-Prescribe    Route: Oral    lacosamide (VIMPAT) 50 MG TABS tablet  93 tablet 3 12/8/2023 --   [id number], [place] - [address] AT [facility] [address]    Sig: Take 1 tablet (50 mg) by mouth every morning    Class: E-Prescribe    Route: Oral    melatonin 3 MG tablet  90 tablet 1 8/4/2023 --   [id number], [place] - [address] AT [facility] [address]    Sig: Take 1 tablet (3 mg) by mouth At Bedtime    Class: E-Prescribe    Route: Oral    midodrine (PROAMATINE) 10 MG tablet  450 tablet 1 12/28/2023 --   [id number], [place] - [address] AT [facility]    Sig: TAKE 1 TABLET 3 X DAILY. ON DIALYSIS DAYS(M, W, F) TAKE 2 EXTRA TABLETS 1 HOUR BEFORE, 1 TABLET WHEN YOU ARRIVE, AND 1 TABLET DURING DIALYSIS    Class: E-Prescribe    Notes to Pharmacy: **Patient requests 90 days supply**    Renewals       Renewal provider: Lanny Daly MD            multivitamin RENAL (TRIPHROCAPS) 1 capsule capsule  30 capsule 3 11/7/2023 --   [id number], [place] - [address] AT [facility] [address]    Sig: Take 1 capsule by mouth daily    Class: E-Prescribe    Route: Oral    oxyCODONE (ROXICODONE) 5 MG tablet  12 tablet 0 1/9/2024 --   [facility] [place], [place] - [address] [facility] [address]    Sig: Take 1 tablet (5 mg) by mouth every 6 hours as needed for severe pain    Class: E-Prescribe    Earliest Fill Date: 1/9/2024    Route: Oral    sevelamer carbonate (RENVELA) 800 MG tablet  -- --  --       Sig: Take 800 mg by mouth 4 times  daily With meals and snacks    Class: Historical    Route: Oral    tacrolimus (GENERIC) 0.5 MG capsule  180 capsule 0 11/1/2023 1/30/2024   [facility] #[id number] - [place], [place] - [address] [facility] [address]    Sig: Take 1 capsule (0.5 mg) by mouth 2 times daily for 90 days    Class: E-Prescribe    Route: Oral    Renewals       Renewal requests to authorizing provider (Deejay Yoon MD) <b>prohibited</b>                  Exam:     There were no vitals taken for this visit.  Appearance: in no apparent distress.   Skin: normal  Eyes:  no redness or discharge.  Sclera anicteric  Head and Neck: Normal, no rashes or jaundice  Respiratory: easy respirations, no audible wheezing.  Abdomen: rounded, No distention   Extremities: femoral 2+/2+, Edema, none  Neuro: without deficit   Psychiatric: Normal mood and affect    Diagnostics:   Recent Results (from the past 672 hour(s))   Basic metabolic panel    Collection Time: 12/18/23  2:36 PM   Result Value Ref Range    Sodium 138 135 - 145 mmol/L    Potassium 4.3 3.4 - 5.3 mmol/L    Chloride 92 (L) 98 - 107 mmol/L    Carbon Dioxide (CO2) 32 (H) 22 - 29 mmol/L    Anion Gap 14 7 - 15 mmol/L    Urea Nitrogen 39.2 (H) 8.0 - 23.0 mg/dL    Creatinine 5.13 (H) 0.67 - 1.17 mg/dL    GFR Estimate 12 (L) >60 mL/min/1.73m2    Calcium 9.6 8.6 - 10.0 mg/dL    Glucose 89 70 - 99 mg/dL   Procalcitonin    Collection Time: 12/18/23  2:36 PM   Result Value Ref Range    Procalcitonin 0.81 (H) <0.50 ng/mL   CRP inflammation    Collection Time: 12/18/23  2:36 PM   Result Value Ref Range    CRP Inflammation 11.65 (H) <5.00 mg/L   CBC with platelets and differential    Collection Time: 12/18/23  2:36 PM   Result Value Ref Range    WBC Count 4.7 4.0 - 11.0 10e3/uL    RBC Count 3.84 (L) 4.40 - 5.90 10e6/uL    Hemoglobin 11.6 (L) 13.3 - 17.7 g/dL    Hematocrit 37.5 (L) 40.0 - 53.0 %    MCV 98 78 - 100 fL    MCH 30.2 26.5 - 33.0 pg    MCHC 30.9 (L) 31.5 - 36.5 g/dL    RDW 14.2 10.0 - 15.0  %    Platelet Count 110 (L) 150 - 450 10e3/uL    % Neutrophils 52 %    % Lymphocytes 30 %    % Monocytes 12 %    % Eosinophils 6 %    % Basophils 1 %    % Immature Granulocytes 0 %    Absolute Neutrophils 2.4 1.6 - 8.3 10e3/uL    Absolute Lymphocytes 1.4 0.8 - 5.3 10e3/uL    Absolute Monocytes 0.5 0.0 - 1.3 10e3/uL    Absolute Eosinophils 0.3 0.0 - 0.7 10e3/uL    Absolute Basophils 0.0 0.0 - 0.2 10e3/uL    Absolute Immature Granulocytes 0.0 <=0.4 10e3/uL   Hepatic panel    Collection Time: 12/18/23  2:36 PM   Result Value Ref Range    Protein Total 8.3 6.4 - 8.3 g/dL    Albumin 4.4 3.5 - 5.2 g/dL    Bilirubin Total 0.6 <=1.2 mg/dL    Alkaline Phosphatase 210 (H) 40 - 150 U/L    AST 24 0 - 45 U/L    ALT 13 0 - 70 U/L    Bilirubin Direct 0.25 0.00 - 0.30 mg/dL   Basic metabolic panel    Collection Time: 12/31/23  1:49 AM   Result Value Ref Range    Sodium 136 135 - 145 mmol/L    Potassium 4.2 3.4 - 5.3 mmol/L    Chloride 90 (L) 98 - 107 mmol/L    Carbon Dioxide (CO2) 25 22 - 29 mmol/L    Anion Gap 21 (H) 7 - 15 mmol/L    Urea Nitrogen 100.4 (H) 8.0 - 23.0 mg/dL    Creatinine 10.60 (H) 0.67 - 1.17 mg/dL    GFR Estimate 5 (L) >60 mL/min/1.73m2    Calcium 9.3 8.6 - 10.0 mg/dL    Glucose 105 (H) 70 - 99 mg/dL   Extra Purple Top Tube    Collection Time: 12/31/23  1:49 AM   Result Value Ref Range    Hold Specimen [facility]    EKG 12 lead    Collection Time: 12/31/23  2:40 AM   Result Value Ref Range    Systolic Blood Pressure  mmHg    Diastolic Blood Pressure  mmHg    Ventricular Rate 68 BPM    Atrial Rate 68 BPM    KY Interval 202 ms    QRS Duration 90 ms     ms    QTc 463 ms    P Axis 55 degrees    R AXIS 28 degrees    T Axis 36 degrees    Interpretation ECG       Sinus rhythm  Normal ECG  When compared with ECG of 30-OCT-2023 06:41,  Minimal criteria for Anterior infarct are no longer Present  T wave amplitude has increased in Anterior leads  QT has lengthened  Confirmed by GENERATED REPORT, COMPUTER ([id number]),  Aasen,  Darwin ([id number]) on 12/31/2023 3:52:32 AM     EKG 12-lead, tracing only    Collection Time: 01/01/24  8:07 AM   Result Value Ref Range    Systolic Blood Pressure  mmHg    Diastolic Blood Pressure  mmHg    Ventricular Rate 64 BPM    Atrial Rate 64 BPM    NV Interval 198 ms    QRS Duration 88 ms     ms    QTc 466 ms    P Axis 50 degrees    R AXIS 46 degrees    T Axis 21 degrees    Interpretation ECG       Sinus rhythm  Low voltage QRS  Cannot rule out Anterior infarct , age undetermined  Abnormal ECG  When compared with ECG of 31-DEC-2023 02:40,  No significant change was found  Confirmed by GENERATED REPORT, COMPUTER ([id number]),  Chelsea Chand ([id number]) on 1/1/2024 8:29:29 AM     Comprehensive metabolic panel    Collection Time: 01/01/24  8:13 AM   Result Value Ref Range    Sodium 135 135 - 145 mmol/L    Potassium 4.7 3.4 - 5.3 mmol/L    Carbon Dioxide (CO2) 24 22 - 29 mmol/L    Anion Gap 20 (H) 7 - 15 mmol/L    Urea Nitrogen 124.5 (H) 8.0 - 23.0 mg/dL    Creatinine 11.96 (H) 0.67 - 1.17 mg/dL    GFR Estimate 4 (L) >60 mL/min/1.73m2    Calcium 9.1 8.6 - 10.0 mg/dL    Chloride 91 (L) 98 - 107 mmol/L    Glucose 90 70 - 99 mg/dL    Alkaline Phosphatase 178 (H) 40 - 150 U/L    AST 17 0 - 45 U/L    ALT 10 0 - 70 U/L    Protein Total 7.4 6.4 - 8.3 g/dL    Albumin 4.0 3.5 - 5.2 g/dL    Bilirubin Total 0.4 <=1.2 mg/dL   Blood Culture Peripheral Blood    Collection Time: 01/01/24  8:13 AM    Specimen: Peripheral Blood   Result Value Ref Range    Culture No Growth    Blood Culture Peripheral Blood    Collection Time: 01/01/24  8:13 AM    Specimen: Peripheral Blood   Result Value Ref Range    Culture No Growth    CBC with platelets and differential    Collection Time: 01/01/24  8:13 AM   Result Value Ref Range    WBC Count 3.5 (L) 4.0 - 11.0 10e3/uL    RBC Count 2.86 (L) 4.40 - 5.90 10e6/uL    Hemoglobin 8.6 (L) 13.3 - 17.7 g/dL    Hematocrit 26.4 (L) 40.0 - 53.0 %    MCV 92 78 - 100 fL    MCH 30.1 26.5 - 33.0 pg    MCHC  32.6 31.5 - 36.5 g/dL    RDW 14.6 10.0 - 15.0 %    Platelet Count 63 (L) 150 - 450 10e3/uL    % Neutrophils 52 %    % Lymphocytes 27 %    % Monocytes 14 %    % Eosinophils 6 %    % Basophils 1 %    % Immature Granulocytes 0 %    NRBCs per 100 WBC 0 <1 /100    Absolute Neutrophils 1.9 1.6 - 8.3 10e3/uL    Absolute Lymphocytes 1.0 0.8 - 5.3 10e3/uL    Absolute Monocytes 0.5 0.0 - 1.3 10e3/uL    Absolute Eosinophils 0.2 0.0 - 0.7 10e3/uL    Absolute Basophils 0.0 0.0 - 0.2 10e3/uL    Absolute Immature Granulocytes 0.0 <=0.4 10e3/uL    Absolute NRBCs 0.0 10e3/uL   Vitamin B12    Collection Time: 01/02/24 11:27 PM   Result Value Ref Range    Vitamin B12 948 232 - 1,245 pg/mL   Folate    Collection Time: 01/02/24 11:27 PM   Result Value Ref Range    Folic Acid 27.6 4.6 - 34.8 ng/mL   Comprehensive metabolic panel    Collection Time: 01/02/24 11:27 PM   Result Value Ref Range    Sodium 138 135 - 145 mmol/L    Potassium 4.5 3.4 - 5.3 mmol/L    Carbon Dioxide (CO2) 31 (H) 22 - 29 mmol/L    Anion Gap 14 7 - 15 mmol/L    Urea Nitrogen 47.0 (H) 8.0 - 23.0 mg/dL    Creatinine 5.73 (H) 0.67 - 1.17 mg/dL    GFR Estimate 11 (L) >60 mL/min/1.73m2    Calcium 9.3 8.6 - 10.0 mg/dL    Chloride 93 (L) 98 - 107 mmol/L    Glucose 93 70 - 99 mg/dL    Alkaline Phosphatase 179 (H) 40 - 150 U/L    AST 22 0 - 45 U/L    ALT 12 0 - 70 U/L    Protein Total 7.5 6.4 - 8.3 g/dL    Albumin 4.2 3.5 - 5.2 g/dL    Bilirubin Total 0.8 <=1.2 mg/dL   Bld morphology pathology review    Collection Time: 01/02/24 11:27 PM   Result Value Ref Range    Final Diagnosis       Peripheral blood, morphology:  - Marked anemia, normocytic and normochromic.  - Moderate thrombocytopenia; no abnormal platelet clumping seen on the slide preparations.  - WBC subsets quantitatively within normal limits and without diagnostic morphologic abnormality.  - Negative for schistocytes and definitive morphologic features of hemolysis.   - Negative for overt features of myelodysplasia  and circulating blasts.    See comment.    COMMENT:  Normocytic anemia is nonspecific and may be attributable to multiple overlapping etiologies, including chronic disease, renal and/or endocrine disease, blood loss, iron deficiency (in some patients), and/or bone marrow suppression (by underlying infection, various nutritional deficiencies, toxicities, alcohol use, or medications).    Thrombocytopenia is nonspecific and possible etiologies may include immune-associated platelet destruction (such as idiopathic thrombocytopenic purpura [ITP], heparin-induced thrombocytopenia [HIT], post-transfusion purpura, or drug-induced antibodies), non-immune-associated platelet destruction (such as recent thrombosis or thrombotic microangiopathies), decreased platelet production (such as vitamin B12 or folate deficiency, underlying infection, myelodysplasia and other hematologic malignancies, aplastic anemia, or bone marrow suppression [by alcohol, medications, chemotherapy, radiation, or marrow infiltrative processes]), medications (including immunosuppressive agents), splenic sequestration of platelets, or liver disease.  Clinical correlation is recommended.      Peripheral Smear       A microscopic examination is performed.       Peripheral Hematologic Data        Latest Reference Range & Units 01/02/24 23:27   WBC 4.0 - 11.0 10e3/uL 4.0   Hemoglobin 13.3 - 17.7 g/dL 8.7 (L)   Hematocrit 40.0 - 53.0 % 26.6 (L)   Platelet Count 150 - 450 10e3/uL 78 (L)   RBC Count 4.40 - 5.90 10e6/uL 2.83 (L)   MCV 78 - 100 fL 94   MCH 26.5 - 33.0 pg 30.7   MCHC 31.5 - 36.5 g/dL 32.7   RDW 10.0 - 15.0 % 14.7   % Neutrophils % 43   % Lymphocytes % 32   % Monocytes % 17   % Eosinophils % 7   % Basophils % 1   Absolute Basophils 0.0 - 0.2 10e3/uL 0.0   Absolute Eosinophils 0.0 - 0.7 10e3/uL 0.3   Absolute Immature Granulocytes <=0.4 10e3/uL 0.0   Absolute Lymphocytes 0.8 - 5.3 10e3/uL 1.3   Absolute Monocytes 0.0 - 1.3 10e3/uL 0.7   % Immature  Granulocytes % 0   Absolute Neutrophils 1.6 - 8.3 10e3/uL 1.8   Absolute NRBCs 10e3/uL 0.0   NRBCs per 100 WBC <1 /100 0   % Retic 0.5 - 2.0 % 1.2   Absolute Retic 0.025 - 0.095 10e6/uL 0.035   (L): Data is abnormally low    For patients over 18 years old, anemia and quantitative abnormalities of WBC and platelets (if present) may be further stratified as follows:    WBC (10^9/L):    Greater than 50.0: Marked leukocytosis    18.0 - 50.0: Moderate leukocytosis    11.1 - 17.9: Mild leukocytosis    3.0 - 3.9: Mild leukopenia    2.0 - 2.9: Moderate leukopenia    Less than 2.0: Marked leukopenia    Hemoglobin (g/dL) in males:    11.3 - 13.2: Mild anemia    9.3 - 11.2: Moderate anemia    Less than 9.3: Marked anemia    Platelets (10^9/L):    Greater than 900: Marked thrombocytosis    601 - 900: Moderate thrombocytosis    451 - 600: Mild thrombocytosis    100 - 149: Mild thrombocytopenia    50 - 99: Moderate thrombocytopenia    Less than 50: Marked thrombocytopenia       Performing Labs       The technical component of this testing was completed at [facility], [facility] and [facility]     CBC with platelets and differential    Collection Time: 01/02/24 11:27 PM   Result Value Ref Range    WBC Count 4.0 4.0 - 11.0 10e3/uL    RBC Count 2.83 (L) 4.40 - 5.90 10e6/uL    Hemoglobin 8.7 (L) 13.3 - 17.7 g/dL    Hematocrit 26.6 (L) 40.0 - 53.0 %    MCV 94 78 - 100 fL    MCH 30.7 26.5 - 33.0 pg    MCHC 32.7 31.5 - 36.5 g/dL    RDW 14.7 10.0 - 15.0 %    Platelet Count 78 (L) 150 - 450 10e3/uL    % Neutrophils 43 %    % Lymphocytes 32 %    % Monocytes 17 %    % Eosinophils 7 %    % Basophils 1 %    % Immature Granulocytes 0 %    NRBCs per 100 WBC 0 <1 /100    Absolute Neutrophils 1.8 1.6 - 8.3 10e3/uL    Absolute Lymphocytes 1.3 0.8 - 5.3 10e3/uL    Absolute Monocytes 0.7 0.0 - 1.3 10e3/uL    Absolute Eosinophils 0.3 0.0 - 0.7 10e3/uL     Absolute Basophils 0.0 0.0 - 0.2 10e3/uL    Absolute Immature Granulocytes 0.0 <=0.4 10e3/uL    Absolute NRBCs 0.0 10e3/uL   Reticulocyte count    Collection Time: 01/02/24 11:27 PM   Result Value Ref Range    % Reticulocyte 1.2 0.5 - 2.0 %    Absolute Reticulocyte 0.035 0.025 - 0.095 10e6/uL   Basic metabolic panel    Collection Time: 01/03/24  7:33 AM   Result Value Ref Range    Sodium 134 (L) 135 - 145 mmol/L    Potassium 4.4 3.4 - 5.3 mmol/L    Chloride 91 (L) 98 - 107 mmol/L    Carbon Dioxide (CO2) 32 (H) 22 - 29 mmol/L    Anion Gap 11 7 - 15 mmol/L    Urea Nitrogen 54.6 (H) 8.0 - 23.0 mg/dL    Creatinine 6.34 (H) 0.67 - 1.17 mg/dL    GFR Estimate 9 (L) >60 mL/min/1.73m2    Calcium 9.7 8.6 - 10.0 mg/dL    Glucose 88 70 - 99 mg/dL   CBC with platelets and differential    Collection Time: 01/03/24  7:33 AM   Result Value Ref Range    WBC Count 3.8 (L) 4.0 - 11.0 10e3/uL    RBC Count 2.88 (L) 4.40 - 5.90 10e6/uL    Hemoglobin 8.6 (L) 13.3 - 17.7 g/dL    Hematocrit 27.4 (L) 40.0 - 53.0 %    MCV 95 78 - 100 fL    MCH 29.9 26.5 - 33.0 pg    MCHC 31.4 (L) 31.5 - 36.5 g/dL    RDW 14.8 10.0 - 15.0 %    Platelet Count 76 (L) 150 - 450 10e3/uL    % Neutrophils 39 %    % Lymphocytes 37 %    % Monocytes 15 %    % Eosinophils 8 %    % Basophils 1 %    % Immature Granulocytes 0 %    NRBCs per 100 WBC 0 <1 /100    Absolute Neutrophils 1.5 (L) 1.6 - 8.3 10e3/uL    Absolute Lymphocytes 1.4 0.8 - 5.3 10e3/uL    Absolute Monocytes 0.6 0.0 - 1.3 10e3/uL    Absolute Eosinophils 0.3 0.0 - 0.7 10e3/uL    Absolute Basophils 0.0 0.0 - 0.2 10e3/uL    Absolute Immature Granulocytes 0.0 <=0.4 10e3/uL    Absolute NRBCs 0.0 10e3/uL   Adult Type and Screen    Collection Time: 01/03/24  7:33 AM   Result Value Ref Range    ABO/RH(D) O POS     Antibody Screen Negative Negative    SPECIMEN EXPIRATION DATE [id number]    Hepatitis B surface antigen    Collection Time: 01/03/24  7:33 AM   Result Value Ref Range    Hepatitis B Surface Antigen  Nonreactive Nonreactive   Hemoglobin and hematocrit    Collection Time: 01/04/24 11:45 AM   Result Value Ref Range    Hemoglobin 7.0 (L) 13.3 - 17.7 g/dL    Hematocrit 21.2 (L) 40.0 - 53.0 %   Hepatitis B Surface Antibody    Collection Time: 01/04/24  3:16 PM   Result Value Ref Range    Hepatitis B Surface Antibody Nonreactive     Hepatitis B Surface Antibody Instrument Value <3.50 <8.5 m[IU]/mL   Hemoglobin and hematocrit    Collection Time: 01/04/24  6:08 PM   Result Value Ref Range    Hemoglobin 6.7 (LL) 13.3 - 17.7 g/dL    Hematocrit 20.3 (L) 40.0 - 53.0 %   CONDITIONAL Prepare red blood cells (unit)    Collection Time: 01/04/24  6:35 PM   Result Value Ref Range    Blood Component Type Red Blood Cells     Product Code O6749F33     Unit Status Transfused     Unit Number [id number]     CROSSMATCH Compatible     CODING SYSTEM [id number]     ISSUE DATE AND TIME [id number]     UNIT ABO/RH O+     UNIT TYPE ISBT 5100    Hemoglobin and hematocrit    Collection Time: 01/05/24  1:33 AM   Result Value Ref Range    Hemoglobin 7.3 (L) 13.3 - 17.7 g/dL    Hematocrit 21.7 (L) 40.0 - 53.0 %   Hemoglobin and hematocrit    Collection Time: 01/05/24  8:24 AM   Result Value Ref Range    Hemoglobin 7.0 (L) 13.3 - 17.7 g/dL    Hematocrit 21.1 (L) 40.0 - 53.0 %   CONDITIONAL Prepare red blood cells (unit)    Collection Time: 01/05/24 12:59 PM   Result Value Ref Range    Blood Component Type Red Blood Cells     Product Code F4037S56     Unit Status Transfused     Unit Number [id number]     CROSSMATCH Compatible     CODING SYSTEM [id number]     ISSUE DATE AND TIME [id number]     UNIT ABO/RH O+     UNIT TYPE ISBT 5100    Hemoglobin    Collection Time: 01/06/24 12:03 AM   Result Value Ref Range    Hemoglobin 7.7 (L) 13.3 - 17.7 g/dL   Glucose by meter    Collection Time: 01/06/24  9:01 AM   Result Value Ref Range    GLUCOSE BY METER POCT 142 (H) 70 - 99 mg/dL   CBC with platelets    Collection Time: 01/06/24  9:15 AM   Result  "Value Ref Range    WBC Count 5.2 4.0 - 11.0 10e3/uL    RBC Count 2.54 (L) 4.40 - 5.90 10e6/uL    Hemoglobin 7.8 (L) 13.3 - 17.7 g/dL    Hematocrit 23.9 (L) 40.0 - 53.0 %    MCV 94 78 - 100 fL    MCH 30.7 26.5 - 33.0 pg    MCHC 32.6 31.5 - 36.5 g/dL    RDW 15.5 (H) 10.0 - 15.0 %    Platelet Count 79 (L) 150 - 450 10e3/uL   Comprehensive metabolic panel    Collection Time: 01/06/24  9:15 AM   Result Value Ref Range    Sodium 127 (L) 135 - 145 mmol/L    Potassium 5.1 3.4 - 5.3 mmol/L    Carbon Dioxide (CO2) 29 22 - 29 mmol/L    Anion Gap 8 7 - 15 mmol/L    Urea Nitrogen 55.5 (H) 8.0 - 23.0 mg/dL    Creatinine 6.30 (H) 0.67 - 1.17 mg/dL    GFR Estimate 10 (L) >60 mL/min/1.73m2    Calcium 8.8 8.6 - 10.0 mg/dL    Chloride 90 (L) 98 - 107 mmol/L    Glucose 110 (H) 70 - 99 mg/dL    Alkaline Phosphatase 209 (H) 40 - 150 U/L    AST 23 0 - 45 U/L    ALT <5 0 - 70 U/L    Protein Total 6.9 6.4 - 8.3 g/dL    Albumin 3.5 3.5 - 5.2 g/dL    Bilirubin Total 1.2 <=1.2 mg/dL     No results found for: \"CPRA\"   "

## 2024-01-11 NOTE — PROGRESS NOTES
Kidney Transplant Evaluation - 1/11/2024  Patient attended appointments accompanied by wife  Patient completed AM appointments with all [facility] providers.  Time and location of PM appointments reviewed with patient.  Patient instructed next contact from Transplant Coordinator will be following Selection Committee  Patient stated understanding  Receipt of Information for Organ Transplant Recipient form signed and faxed to HIM  KDPI form signed and faxed to HIM       Summary    Team s concerns/comments:   1) Cardiac risk assessment  2) PAD assessment  3) CARRILLO S/P Liver TX  4) SCC arm  5) Seizure  6) Health maintenance    Candidacy category: Yellow    Action/Plan:   1) EKG, echocardiogram today. Cardiology consult and clearance  2) A/P Ct without contrast  3) Clearance per Liver TX team  4) Follow up w Dermatology  5) Following w Neurology to wean off lacosamide  6) Colonoscopy due. Follow up w Dermatology      Expected Selection Meeting Discussion: 1/17/24

## 2024-01-12 LAB
BK VIRUS SPECIMEN TYPE: NORMAL
BKV DNA # SPEC NAA+PROBE: NOT DETECTED IU/ML
C PEPTIDE SERPL-MCNC: 14.5 NG/ML (ref 0.9–6.9)
CARDIOLIPIN IGG SER IA-ACNC: 2 GPL-U/ML
CARDIOLIPIN IGG SER IA-ACNC: NEGATIVE
CARDIOLIPIN IGM SER IA-ACNC: <2 MPL-U/ML
CARDIOLIPIN IGM SER IA-ACNC: NEGATIVE
CMV IGG SERPL IA-ACNC: >10 U/ML
CMV IGG SERPL IA-ACNC: ABNORMAL
CMV IGM SERPL IA-ACNC: <8 AU/ML
CMV IGM SERPL IA-ACNC: NEGATIVE
EBV VCA IGG SER IA-ACNC: >750 U/ML
EBV VCA IGG SER IA-ACNC: POSITIVE
EBV VCA IGM SER IA-ACNC: 12.5 U/ML
EBV VCA IGM SER IA-ACNC: NORMAL
HBV CORE AB SERPL QL IA: NONREACTIVE
HBV SURFACE AB SERPL IA-ACNC: 7.6 M[IU]/ML
HBV SURFACE AB SERPL IA-ACNC: NONREACTIVE M[IU]/ML
HBV SURFACE AG SERPL QL IA: NONREACTIVE

## 2024-01-12 RX ORDER — OXYCODONE HYDROCHLORIDE 5 MG/1
5 TABLET ORAL EVERY 6 HOURS PRN
Qty: 12 TABLET | Refills: 0 | OUTPATIENT
Start: 2024-01-12

## 2024-01-12 RX ORDER — OXYCODONE HYDROCHLORIDE 5 MG/1
5 TABLET ORAL EVERY 6 HOURS PRN
Qty: 12 TABLET | Refills: 0 | Status: CANCELLED | OUTPATIENT
Start: 2024-01-12

## 2024-01-12 NOTE — TELEPHONE ENCOUNTER
Patient is s/p excision and repair of left brachiocephalic AVF and vein patch angioplasty on 1/3/24 with Dr. Mcneil.    Last oxycodone refill on 1/9/24, #12 tablets, every 6 hours.    Spoke with patient.  Notes 8/10 pain.    Bruised from wrist to armpit. (He is on Eliquis)    Swelling has come down.    Taking Tylenol without relief.    Routing to [facility] for approval.  Med and Pharm loaded.    ANAND RoblesN, RN, -[facility] [place]

## 2024-01-13 LAB
GAMMA INTERFERON BACKGROUND BLD IA-ACNC: 0.01 IU/ML
HBV E AB SERPL QL IA: NEGATIVE
M TB IFN-G BLD-IMP: NEGATIVE
M TB IFN-G CD4+ BCKGRND COR BLD-ACNC: 9.99 IU/ML
MITOGEN IGNF BCKGRD COR BLD-ACNC: 0.01 IU/ML
MITOGEN IGNF BCKGRD COR BLD-ACNC: 0.01 IU/ML
QUANTIFERON MITOGEN: 10 IU/ML
QUANTIFERON NIL TUBE: 0.01 IU/ML
QUANTIFERON TB1 TUBE: 0.02 IU/ML
QUANTIFERON TB2 TUBE: 0.02

## 2024-01-16 ENCOUNTER — TELEPHONE (OUTPATIENT)
Dept: GASTROENTEROLOGY | Facility: CLINIC | Age: 59
End: 2024-01-16

## 2024-01-16 ENCOUNTER — OFFICE VISIT (OUTPATIENT)
Dept: FAMILY MEDICINE | Facility: CLINIC | Age: 59
End: 2024-01-16
Payer: COMMERCIAL

## 2024-01-16 VITALS
DIASTOLIC BLOOD PRESSURE: 64 MMHG | WEIGHT: 193.7 LBS | SYSTOLIC BLOOD PRESSURE: 98 MMHG | OXYGEN SATURATION: 98 % | HEART RATE: 74 BPM | HEIGHT: 71 IN | TEMPERATURE: 97.7 F | BODY MASS INDEX: 27.12 KG/M2 | RESPIRATION RATE: 18 BRPM

## 2024-01-16 DIAGNOSIS — T82.898A OTHER SPECIFIED COMPLICATION OF VASCULAR PROSTHETIC DEVICES, IMPLANTS AND GRAFTS, INITIAL ENCOUNTER (H): ICD-10-CM

## 2024-01-16 DIAGNOSIS — Z99.2 ESRD (END STAGE RENAL DISEASE) ON DIALYSIS (H): ICD-10-CM

## 2024-01-16 DIAGNOSIS — Z09 HOSPITAL DISCHARGE FOLLOW-UP: Primary | ICD-10-CM

## 2024-01-16 DIAGNOSIS — Z23 NEED FOR VACCINATION: ICD-10-CM

## 2024-01-16 DIAGNOSIS — N18.6 ESRD (END STAGE RENAL DISEASE) ON DIALYSIS (H): ICD-10-CM

## 2024-01-16 DIAGNOSIS — Z12.11 SPECIAL SCREENING FOR MALIGNANT NEOPLASMS, COLON: Primary | ICD-10-CM

## 2024-01-16 DIAGNOSIS — Z12.11 SPECIAL SCREENING FOR MALIGNANT NEOPLASMS, COLON: ICD-10-CM

## 2024-01-16 DIAGNOSIS — Z94.4 LIVER TRANSPLANTED (H): ICD-10-CM

## 2024-01-16 LAB
A*LOCUS SEROLOGIC EQUIVALENT: 2
A*LOCUS: NORMAL
ABTEST METHOD: NORMAL
B*LOCUS SEROLOGIC EQUIVALENT: 60
B*LOCUS: NORMAL
BASOPHILS # BLD AUTO: 0 10E3/UL (ref 0–0.2)
BASOPHILS NFR BLD AUTO: 1 %
BW-1: NORMAL
C*LOCUS SEROLOGIC EQUIVALENT: 10
C*LOCUS: NORMAL
DPA1*: NORMAL
DPB1*: NORMAL
DPB1*LOCUS NMDP: NORMAL
DPB1*LOCUS: NORMAL
DPB1*NMDP: NORMAL
DQA1*: NORMAL
DQA1*LOCUS: NORMAL
DQB1*: NORMAL
DQB1*LOCUS SEROLOGIC EQUIVALENT: 8
DQB1*LOCUS: NORMAL
DQB1*SEROLOGIC EQUIVALENT: 6
DRB1*: NORMAL
DRB1*LOCUS SEROLOGIC EQUIVALENT: 4
DRB1*LOCUS: NORMAL
DRB1*SEROLOGIC EQUIVALENT: 13
DRB3*LOCUS SEROLOGIC EQUIVALENT: 52
DRB3*LOCUS: NORMAL
DRB4*: NORMAL
DRB4*SEROLOGIC EQUIVALENT: 53
DRSSO TEST METHOD: NORMAL
EOSINOPHIL # BLD AUTO: 0.3 10E3/UL (ref 0–0.7)
EOSINOPHIL NFR BLD AUTO: 7 %
ERYTHROCYTE [DISTWIDTH] IN BLOOD BY AUTOMATED COUNT: 17.4 % (ref 10–15)
HCT VFR BLD AUTO: 28.6 % (ref 40–53)
HGB BLD-MCNC: 8.7 G/DL (ref 13.3–17.7)
IMM GRANULOCYTES # BLD: 0 10E3/UL
IMM GRANULOCYTES NFR BLD: 0 %
LYMPHOCYTES # BLD AUTO: 1.3 10E3/UL (ref 0.8–5.3)
LYMPHOCYTES NFR BLD AUTO: 31 %
MCH RBC QN AUTO: 30.7 PG (ref 26.5–33)
MCHC RBC AUTO-ENTMCNC: 30.4 G/DL (ref 31.5–36.5)
MCV RBC AUTO: 101 FL (ref 78–100)
MONOCYTES # BLD AUTO: 0.6 10E3/UL (ref 0–1.3)
MONOCYTES NFR BLD AUTO: 13 %
NEUTROPHILS # BLD AUTO: 2.1 10E3/UL (ref 1.6–8.3)
NEUTROPHILS NFR BLD AUTO: 49 %
PLATELET # BLD AUTO: 125 10E3/UL (ref 150–450)
RBC # BLD AUTO: 2.83 10E6/UL (ref 4.4–5.9)
WBC # BLD AUTO: 4.3 10E3/UL (ref 4–11)

## 2024-01-16 PROCEDURE — 84100 ASSAY OF PHOSPHORUS: CPT | Performed by: PHYSICIAN ASSISTANT

## 2024-01-16 PROCEDURE — 85025 COMPLETE CBC W/AUTO DIFF WBC: CPT | Performed by: PHYSICIAN ASSISTANT

## 2024-01-16 PROCEDURE — 36415 COLL VENOUS BLD VENIPUNCTURE: CPT | Performed by: PHYSICIAN ASSISTANT

## 2024-01-16 PROCEDURE — 99215 OFFICE O/P EST HI 40 MIN: CPT | Mod: 25 | Performed by: PHYSICIAN ASSISTANT

## 2024-01-16 PROCEDURE — 90750 HZV VACC RECOMBINANT IM: CPT | Performed by: PHYSICIAN ASSISTANT

## 2024-01-16 PROCEDURE — 90471 IMMUNIZATION ADMIN: CPT | Performed by: PHYSICIAN ASSISTANT

## 2024-01-16 RX ORDER — OXYCODONE HYDROCHLORIDE 5 MG/1
5 TABLET ORAL EVERY 6 HOURS PRN
Qty: 28 TABLET | Refills: 0 | Status: SHIPPED | OUTPATIENT
Start: 2024-01-16 | End: 2024-01-23

## 2024-01-16 ASSESSMENT — PAIN SCALES - GENERAL: PAINLEVEL: SEVERE PAIN (6)

## 2024-01-16 NOTE — TELEPHONE ENCOUNTER
Patient called stating he would like to know what is going on with the prescription request discussed in the below notes. Patient has a current prescription sent to his pharmacy from Dr Carter but patient would like Dr Tarun GREEN to call to discuss the prescription he requested on 01/12/24. Patient states he has not had any sleep all weekend and is now waiting for another prescription. Informed patient that I am not a nurse so I cannot answer his questions regarding the medication but I will send a message for RN to contact the patient to discuss his concerns.

## 2024-01-16 NOTE — TELEPHONE ENCOUNTER
"Endoscopy Scheduling Screen    Have you had a positive Covid test in the last 14 days?  No    Are you active on MyChart?   Yes    What insurance is in the chart?  Other:  [facility]    Ordering/Referring Provider: RIO ROBLERO   (If ordering provider performs procedure, schedule with ordering provider unless otherwise instructed. )    BMI: Estimated body mass index is 27.02 kg/m  as calculated from the following:    Height as of an earlier encounter on 1/16/24: 1.803 m (5' 11\").    Weight as of an earlier encounter on 1/16/24: 87.9 kg (193 lb 11.2 oz).     Sedation Ordered  moderate sedation.   If patient BMI > 50 do not schedule in ASC.    If patient BMI > 45 do not schedule at [facility].    Are you taking methadone or Suboxone?  No    Are you taking any prescription medications for pain 3 or more times per week?   NO - No RN review required.    Do you have a history of malignant hyperthermia or adverse reaction to anesthesia?  No    (Females) Are you currently pregnant?   No     Have you been diagnosed or told you have pulmonary hypertension?   Yes MAC required in hospital setting only. PAC evaluation required if scheduled at [facility].    Do you have an LVAD?  No    Have you been told you have moderate to severe sleep apnea?  No    Have you been told you have COPD, asthma, or any other lung disease?  No    Do you have any heart conditions?  No     Have you ever had an organ transplant?   Yes - No [facility]    Have you ever had or are you awaiting a heart or lung transplant?   No    Have you had a stroke or transient ischemic attack (TIA aka \"mini stroke\" in the last 6 months?   No    Have you been diagnosed with or been told you have cirrhosis of the liver?   No    Are you currently on dialysis?   Yes (Hospital Only)    Do you need assistance transferring?   No    BMI: Estimated body mass index is 27.02 kg/m  as calculated from the following:    Height as of an earlier encounter on 1/16/24: 1.803 m (5' 11\").    Weight as of an " earlier encounter on 1/16/24: 87.9 kg (193 lb 11.2 oz).     Is patients BMI > 40 and scheduling location [facility]?  No    Do you take an injectable medication for weight loss or diabetes (excluding insulin)?  No    Do you take the medication Naltrexone?  No    Do you take blood thinners?  Yes     Are you taking Effient/Prasugrel?  No, you must contact your prescribing provider for direction on holding or bridging with a different medication.       Prep   Are you currently on dialysis or do you have chronic kidney disease?  Yes (Golytely Prep)    Do you have a diagnosis of diabetes?  No    Do you have a diagnosis of cystic fibrosis (CF)?  No    On a regular basis do you go 3 -5 days between bowel movements?  No    BMI > 40?  No    Preferred Pharmacy:    [id number] - [place] - [address] AT [facility] [address] [address]  Phone: [phone] Fax: [phone]      Final Scheduling Details   Colonoscopy prep sent?  Standard Golytely    Procedure scheduled  Colonoscopy    Surgeon:  MEENA     Date of procedure:  3/26/24     Pre-OP / PAC:   Yes - Patient informed of pre-op requirement.    Location  [facility] - Patient preference.    Sedation   MAC/Deep Sedation - Per exclusion criteria.      Patient Reminders:   You will receive a call from a Nurse to review instructions and health history.  This assessment must be completed prior to your procedure.  Failure to complete the Nurse assessment may result in the procedure being cancelled.      On the day of your procedure, please designate an adult(s) who can drive you home stay with you for the next 24 hours. The medicines used in the exam will make you sleepy. You will not be able to drive.      You cannot take public transportation, ride share services, or non-medical taxi service without a responsible caregiver.  Medical transport services are allowed with the requirement that a responsible caregiver will receive you at your  destination.  We require that drivers and caregivers are confirmed prior to your procedure.

## 2024-01-16 NOTE — PROGRESS NOTES
Assessment & Plan     Hospital discharge follow-up  ESRD (end stage renal disease) on dialysis (H)  Liver transplanted (H)  Other specified complication of vascular prosthetic devices, implants and grafts, initial encounter (H24)    Doing well. Complex medical care. Labs today. Refilled Oxycodone originally sent by vascular team post-hospital. Short term script. Safety reviewed. Specialist follow-up including derm, cards, transplant, sleep and neuro. Continue same meds.     - CBC with platelets and differential  - Phosphorus  - oxyCODONE (ROXICODONE) 5 MG tablet  Dispense: 28 tablet; Refill: 0    Special screening for malignant neoplasms, colon  Colonoscopy order signed - call to schedule.   - Colonoscopy Screening  Referral    Need for vaccination  #2 shingles given today. Tolerated first immunization well.       40 minutes spent by me on the date of the encounter doing chart review, review of test results, interpretation of tests, patient visit, documentation, and discussion with family      MED REC REQUIRED  Post Medication Reconciliation Status: discharge medications reconciled and changed, per note/orders    The likelihood of other entities in the differential is insufficient to justify any further testing for them at this time. This was explained to the patient. The patient was advised that persistent or worsening symptoms would require further evaluation. Patient advised to call the office and if unable to reach to go to the emergency room if they develop any new or worsening symptoms. Expressed understanding and agreement with above stated plan.     WALDO Ronquillo [facility] MECHE Simeon is a 58 year old, presenting for the following health issues:  Hospital F/U (Patient here is for a hospital follow up.  )    Here today for hospital discharge follow-up with his spouse.   Doing well since hospital discharge - upcoming follow-up with vascular s/p  "pseudoaneursym repair and cephalic vein patch angioplasty. On-going pain following repair. Requesting refill on pain medication. Resumed dialysis with [facility].     Undergoing eval for kidney transplant. Needs to get caught up on cancer screening with colonoscopy (ordered) + derm skin check (Dr. Ford).      Upcoming sleep med, neurology, cardiology follow-up in anticipation of transplant approval.     Appetite stable. No N/V/ABD pain. No leg swelling.       Hospital Follow-up Visit:    Hospital/Nursing Home/IP Rehab Facility: [facility]  Date of Admission: 01/01/2024  Date of Discharge: 01/06/2024  Reason(s) for Admission:   ESRD (end stage renal disease) on dialysis (H)    Uremia    Malfunction of arteriovenous dialysis fistula, initial encounter (H24)    Was your hospitalization related to COVID-19? No   Problems taking medications regularly:  None  Medication changes since discharge: None  Problems adhering to non-medication therapy:  None    Summary of hospitalization:  [facility] discharge summary reviewed  Diagnostic Tests/Treatments reviewed.  Follow up needed: Multiple including transplant, vascular, dialysis team, Derm, colonoscopy upcoming, sleep  Other Healthcare Providers Involved in Patient s Care:         see above  Update since discharge: improved.         Plan of care communicated with patient             Review of Systems   Constitutional, HEENT, cardiovascular, pulmonary, GI, , musculoskeletal, neuro, skin, endocrine and psych systems are negative, except as otherwise noted.      Objective    BP 98/64 (BP Location: Right arm, Patient Position: Sitting, Cuff Size: Adult Large)   Pulse 74   Temp 97.7  F (36.5  C) (Temporal)   Resp 18   Ht 1.803 m (5' 11\")   Wt 87.9 kg (193 lb 11.2 oz)   SpO2 98%   BMI 27.02 kg/m    Body mass index is 27.02 kg/m .  Physical Exam   GENERAL: healthy, alert and no distress  EYES: Eyes grossly normal to inspection, PERRL and " conjunctivae and sclerae normal  NECK: no adenopathy, no asymmetry, masses, or scars and thyroid normal to palpation  RESP: lungs clear to auscultation - no rales, rhonchi or wheezes  CV: regular rate and rhythm, normal S1 S2, no S3 or S4, no murmur, click or rub, no peripheral edema and peripheral pulses strong  ABDOMEN: soft, nontender, no hepatosplenomegaly, no masses and bowel sounds normal  MS: no gross musculoskeletal defects noted, no edema  SKIN: no suspicious lesions or rashes  LUE AV fistula with good thrill but undergoing HD through right perm cath . Well healing.  NEURO: Normal strength and tone, mentation intact and speech normal  PSYCH: mentation appears normal, affect normal/bright

## 2024-01-16 NOTE — NURSING NOTE
Prior to immunization administration, verified patients identity using patient s name and date of birth. Please see Immunization Activity for additional information.     Screening Questionnaire for Adult Immunization    Are you sick today?   No   Do you have allergies to medications, food, a vaccine component or latex?   No   Have you ever had a serious reaction after receiving a vaccination?   No   Do you have a long-term health problem with heart, lung, kidney, or metabolic disease (e.g., diabetes), asthma, a blood disorder, no spleen, complement component deficiency, a cochlear implant, or a spinal fluid leak?  Are you on long-term aspirin therapy?   Yes   Do you have cancer, leukemia, HIV/AIDS, or any other immune system problem?   No   Do you have a parent, brother, or sister with an immune system problem?   No   In the past 3 months, have you taken medications that affect  your immune system, such as prednisone, other steroids, or anticancer drugs; drugs for the treatment of rheumatoid arthritis, Crohn s disease, or psoriasis; or have you had radiation treatments?   Yes   Have you had a seizure, or a brain or other nervous system problem?   No   During the past year, have you received a transfusion of blood or blood    products, or been given immune (gamma) globulin or antiviral drug?   Yes   For women: Are you pregnant or is there a chance you could become       pregnant during the next month?   No   Have you received any vaccinations in the past 4 weeks?   Yes     Immunization questionnaire was positive for at least one answer.  Notified Ki Carter PA-C.      Patient instructed to remain in clinic for 15 minutes afterwards, and to report any adverse reactions.     Screening performed by Luis Lowery MA on 1/16/2024 at 12:36 PM.

## 2024-01-17 ENCOUNTER — COMMITTEE REVIEW (OUTPATIENT)
Dept: TRANSPLANT | Facility: CLINIC | Age: 59
End: 2024-01-17
Payer: COMMERCIAL

## 2024-01-17 ENCOUNTER — TELEPHONE (OUTPATIENT)
Dept: OTHER | Facility: CLINIC | Age: 59
End: 2024-01-17
Payer: COMMERCIAL

## 2024-01-17 ENCOUNTER — OFFICE VISIT (OUTPATIENT)
Dept: OTHER | Facility: CLINIC | Age: 59
End: 2024-01-17
Payer: COMMERCIAL

## 2024-01-17 VITALS — HEART RATE: 72 BPM | DIASTOLIC BLOOD PRESSURE: 71 MMHG | SYSTOLIC BLOOD PRESSURE: 111 MMHG

## 2024-01-17 DIAGNOSIS — N18.6 ESRD (END STAGE RENAL DISEASE) ON DIALYSIS (H): Primary | ICD-10-CM

## 2024-01-17 DIAGNOSIS — Z99.2 ESRD (END STAGE RENAL DISEASE) ON DIALYSIS (H): Primary | ICD-10-CM

## 2024-01-17 DIAGNOSIS — Z01.818 PRE-TRANSPLANT EVALUATION FOR ESRD (END STAGE RENAL DISEASE): Primary | ICD-10-CM

## 2024-01-17 DIAGNOSIS — T82.838D: ICD-10-CM

## 2024-01-17 LAB — PHOSPHATE SERPL-MCNC: 4.9 MG/DL (ref 2.5–4.5)

## 2024-01-17 PROCEDURE — 99214 OFFICE O/P EST MOD 30 MIN: CPT

## 2024-01-17 PROCEDURE — G0463 HOSPITAL OUTPT CLINIC VISIT: HCPCS

## 2024-01-17 NOTE — PROGRESS NOTES
[facility]        Patient is here for a  follow up.    Pt is currently taking Eliquis.    /71 (BP Location: Right arm, Patient Position: Chair, Cuff Size: Adult Regular)   Pulse 72     The provider has been notified that the patient has no concerns.     Questions patient would like addressed today are: N/A.    Refills are needed: N/A    Has homecare services and agency name:  Parul Valdez MA

## 2024-01-17 NOTE — Clinical Note
Ha; please assist with scheduling angiogram.  Tamara; please arrange cardiology follow up for 2 weeks after angiogram with me.   Thank you!

## 2024-01-17 NOTE — PROGRESS NOTES
Patient requiring angiogram as part of kidney transplant evaluation. Orders placed. STEFFANY Gibbs CNP on 1/17/2024 at 2:07 PM

## 2024-01-17 NOTE — TELEPHONE ENCOUNTER
This is a follow up to Sheila's  message from yesterday - Blanco would also like Dr Mcneil to remove his stitches - they are very itchy and he wants them out sooner then next Tuesday at his [facility] visit - he is trying for tomorrow - I explained that I will get this sent to the nurses- he states that he hasn't received a call back from anyone - his prescription has not been filled -

## 2024-01-17 NOTE — COMMITTEE REVIEW
Abdominal Committee Review Note     Evaluation Date: 1/11/2024  Committee Review Date: 1/17/2024    Organ being evaluated for: Kidney    Transplant Phase: Evaluation  Transplant Status: Active    Transplant Coordinator: Tamara Khan  Transplant Surgeon:   Jack Ware    Referring Physician: Zenon Barnard    Primary Diagnosis: Cirrhosis: Fatty Liver (CARRILLO)  Secondary Diagnosis:     Committee Review Members:  Nephrology Terry Baez MD, Marlon Salmon, APRN CNP   Nutrition Gunjan Grullon, DEANNA   Pharmacist Mar Jamil, [facility]    - Clinical Vianca Becky Barney, [facility], Martha Hoyt, [facility]   Transplant ANA MONTGOMERY, RN, Pat Wong, RN, Tamara Abel, RN, Martha Aguilar, RN, Laura Parson, NP, Laura Erwin, RN, Reynaldo Bhatia, RN, Mimi Cali, RN, Theresa Arechiga RN   Transplant Surgery Nilda Purcell, STEFFANY ESCOBAR, Michel Robertson MD       Transplant Eligibility: Irreversible chronic kidney disease treated w/dialysis or expected need for dialysis    Committee Review Decision: Approved    Relative Contraindications: None    Absolute Contraindications: None    Committee Chair Michel Robertson MD verbally attested to the committee's decision.    Committee Discussion Details: Reviewed pt's medical status and evaluation results to date with multidisciplinary committee.  Due to the pt's complex medical history and comorbidities, the Committee feels the pt is best suited as a candidate for LDKT only but if all potential donor options are exhausted, may reconsider listing for DDKT.     Recommended the following evaluation items:    Cardiology: will need clearance w/ coronary angiogram  Dermatology: will need full body skin assessment   Imaging Recommended:  due for iliac US, will have transplant surgery review CT 10/31/23  Health Maintenance:  due for colonoscopy     Patient should have live donors register now to initiate  donor evaluation: Yes    Committee determined that patient is a Good candidate for Kidney     Listing plan: Will not list at this time as patient is on dialysis    A2B Candidate: No    Patient will be called and summary letter will be sent.

## 2024-01-17 NOTE — TELEPHONE ENCOUNTER
Returned call to patient.  Apologized to patient for the delay in response.  Appears PA has refilled script for oxycodone.  Discussed he could come in today to possibly get sutures removed.  Discussed if he had extensive swelling, this may not be possible.    Patient verbalized understanding and will be seen later today.    Raquel Rod, ANANDN, RN, [facility] [place]

## 2024-01-18 ENCOUNTER — TELEPHONE (OUTPATIENT)
Dept: TRANSPLANT | Facility: CLINIC | Age: 59
End: 2024-01-18
Payer: COMMERCIAL

## 2024-01-18 DIAGNOSIS — N18.6 ESRD (END STAGE RENAL DISEASE) (H): Primary | ICD-10-CM

## 2024-01-18 DIAGNOSIS — Z76.82 ORGAN TRANSPLANT CANDIDATE: ICD-10-CM

## 2024-01-18 DIAGNOSIS — I10 HYPERTENSION: ICD-10-CM

## 2024-01-18 NOTE — LETTER
24        Blanco Osborne  [address]  [facility] [address]        Dear Blanco,    It was a pleasure to see you recently for consideration of kidney transplantation. Your pre-transplant evaluation results were reviewed at our Multidisciplinary Selection Committee on 24.  Due to your complex medical history, the Committee is strongly recommending you proceed only with a living donor. The Committee is requesting the following items are completed as part of your evaluation:    Cardiac clearance to include coronary angiogram - this test is scheduled on 24    Dermatology - please ensure you have an updated full body skin test, please let me know when done so that I can request records     Colonoscopy - scheduled on 3/26/24    Iliac ultrasound - our schedulers will call you to arrange this test    Please complete the pre-kidney transplant education video series online and call me to review that information over the phone once complete     Once the above tests are completed, the Committee will revisit your case.  Pending the results of your testing, you may be approved for living donor kidney transplant only versus being approved to be placed on the  donor transplant list. For any questions, please contact the Transplant Office at [phone].      Sincerely,  Tamara Abel RN, Pre-Kidney/Pancreas Transplant Coordinator   Solid Organ Transplant  [facility], [facility]    CC: Care Team

## 2024-01-18 NOTE — TELEPHONE ENCOUNTER
Called pt to review outcome of Selectin Committee.  We discussed that we are prioritizing living donor kidney and want to exhaust all potential living donors before considering DDKT d/t his medical complexity. He states he has interested donors and has the registration link to provide them to initiate the process.  We then reviewed his evaluation follow up items:    Angiogram 2/22/24   Derm - has upcoming visit at [facility]-will get records    Treponema Antibody - lab draw 2/22/24  Colonoscopy 3/26/24   Iliac US - ordered, not yet scheduled   Review 10/31/23 CT   MTP - pt has started watching the video series     Orders placed for follow up testing. Pt will let me know once he sees Dermatology and completes his transplant education. Pt had no further questions and was in good agreement w/ the plan. Evaluation Summary letter sent.

## 2024-01-22 LAB
PROTOCOL CUTOFF: NORMAL
SA 1 CELL: NORMAL
SA 1 TEST METHOD: NORMAL
SA 2 CELL: NORMAL
SA 2 TEST METHOD: NORMAL
SA1 HI RISK ABY: NORMAL
SA1 MOD RISK ABY: NORMAL
SA2 HI RISK ABY: NORMAL
SA2 MOD RISK ABY: NORMAL
UNACCEPTABLE ANTIGENS: NORMAL
UNOS CPRA: 35
ZZZSA 1  COMMENTS: NORMAL
ZZZSA 2 COMMENTS: NORMAL

## 2024-01-25 ENCOUNTER — HOSPITAL ENCOUNTER (OUTPATIENT)
Dept: CARDIAC REHAB | Facility: CLINIC | Age: 59
Discharge: HOME | End: 2024-01-25
Attending: STUDENT IN AN ORGANIZED HEALTH CARE EDUCATION/TRAINING PROGRAM
Payer: COMMERCIAL

## 2024-01-25 PROCEDURE — 93798 PHYS/QHP OP CAR RHAB W/ECG: CPT | Performed by: OCCUPATIONAL THERAPIST

## 2024-01-26 NOTE — PROGRESS NOTES
VASCULAR SURGERY PROGRESS NOTE    [facility]  Blanco Osborne  Medical Record #: [id number]  YOB: 1965  Age: 58 year old     Date of Service: 1/17/2024    PRIMARY CARE PROVIDER: Ki Carter    Reason for visit:  post-operative appointment, suture removal    Blanco Osborne is a 58 year old male who underwent repair of pseudoaneurysm and evacuation of a large hematoma left upper arm AVF with vein patch to stenotic area in mid graft to mid graft on 01/03/2024.     On examination today Blanco is alert and oriented x4  Pain continues to slowly improve. Edema continues to improve   Sutures removed without incident, continues to use tunneled catheter for dialysis.   Edema has significantly improved in the arm  2+ radial pulse, palpable thrill      RECOMMENDATION/RISKS: Continued to use tunneled catheter. Follow-up in 4 weeks with duplex of the fistula, see Dr. Mcneil or myself. Continue to elevate. Use pain medication as needed.    REVIEW OF SYSTEMS:    A 12 point ROS was reviewed and is negative except for what is listed above in HPI.    PHH:    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    Cancer (H) 2023    Squamous Cell Cancer- Since resolved    Cirrhosis of liver with ascites (H) 02/11/2016    Coagulopathy (H24)     Critical illness myopathy     Dialysis patient (H24)     DIALYSIS 3X/ WEEK    Embolic stroke (H) 11/20/2022    Encephalopathy     ESRD (end stage renal disease) on dialysis (H)     Gastroesophageal reflux disease     H/O alcohol abuse     History of blood transfusion     Hyperammonemia (H24)     Hyperlipidemia     Hypertension     Patients states that HTN has been resolved    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    Insomnia     Lactic acidosis 11/12/2022    Liver replaced by transplant (H) 09/20/2016    NAFLD (nonalcoholic fatty liver disease) 02/11/2016    CHRISTIAN on CPAP     Parainfluenza type 1 infection 11/19/2022    Parapneumonic effusion     Pleural effusion     Pneumothorax on left 12/16/2022    Respiratory acidosis 11/12/2022    Respiratory arrest (H) 11/12/2022    Restless legs syndrome (RLS)     SBP (spontaneous bacterial peritonitis) (H)     MNGI    Seizures (H) 12/2022    Sepsis due to Streptococcus pneumoniae with acute hypoxic respiratory failure (H) 11/19/2022    Stroke, embolic (H) 11/20/2022    Sudden cardiac arrest (H) 12/29/2022    PEA- 2 min of CPR    Tubular adenoma 10/2019    Large cecal adenoma- due for surveillance colonoscopy in 3 years (10/2022)          Past Surgical History:   Procedure Laterality Date    APPENDECTOMY      BENCH LIVER N/A 09/20/2016    Procedure: BENCH LIVER;  Surgeon: Enoc Crews MD;  Location: [facility] OR    BRONCHOSCOPY FLEXIBLE AND RIGID N/A 12/20/2022    Procedure: BRONCHOSCOPY;  Surgeon: Alena Valenzuela MD;  Location:  GI    COLONOSCOPY  08/06/2013    repeat in 2018    COLONOSCOPY N/A 10/04/2019    Procedure: COLONOSCOPY, WITH POLYPECTOMY AND BIOPSY;  Surgeon: Go Chong MD;  Location: [facility] OR    CREATE FISTULA ARTERIOVENOUS UPPER EXTREMITY Left 03/21/2023    Procedure: LEFT UPPER EXTREMITY ARTERIOVENOUS FISTULA CREATION. LIGATION OF COMPETING VASCULAR BRANCHES- LEFT;  Surgeon: Deejay Mcneil MD;  Location:  OR    CV PERICARDIOCENTESIS N/A 9/29/2023    Procedure: Pericardiocentesis;  Surgeon: Barry Mckeon MD;  Location:  HEART CARDIAC CATH LAB    CV PERICARDIOCENTESIS N/A 10/11/2023    Procedure: Pericardiocentesis;  Surgeon: Ulises Bhakta MD;  Location:  HEART CARDIAC CATH LAB    CV RIGHT HEART CATH MEASUREMENTS RECORDED N/A 10/11/2023    Procedure: Right Heart Catheterization;  Surgeon: Ulises Bhakta  MD Keely;  Location:  HEART CARDIAC CATH LAB    HERNIA REPAIR      IR CHEST TUBE PLACEMENT NON-TUNNELED RIGHT  2022    IR CVC TUNNEL PLACEMENT > 5 YRS OF AGE  2022    IR CVC TUNNEL PLACEMENT > 5 YRS OF AGE  1/3/2024    IR DIALYSIS FISTULOGRAM LEFT  2023    IR GASTROSTOMY TUBE CHANGE  2023    IR GASTROSTOMY TUBE PERCUTANEOUS PLCMNT  2022    IR PARACENTESIS  2022    IR PARACENTESIS  2022    IR PARACENTESIS  2/10/2016    IR PARACENTESIS  2016    IR THORACENTESIS  2022    IR THORACENTESIS  2020    IR THORACENTESIS  08/10/2020    IR TRANSCATHETER BIOPSY  2022    REVISION FISTULA ARTERIOVENOUS UPPER EXTREMITY Left 8/15/2023    Procedure: 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 2; OUTFLOW REVISION CEPHALIC TO BRACHIAL VEIN;  Surgeon: Deejay Mcneil MD;  Location:  OR    REVISION FISTULA ARTERIOVENOUS UPPER EXTREMITY Left 1/3/2024    Procedure: Excision and repair of LEFT brachiocephalic arteriovenous fistula and vein patch angioplasty;  Surgeon: Deejay Mcneil MD;  Location:  OR    TRACHEOSTOMY N/A 2022    Procedure: TRACHEOSTOMY;  Surgeon: Nilesh Jackson MD;  Location:  OR    TRANSPLANT LIVER RECIPIENT  DONOR N/A 2016    Procedure: TRANSPLANT LIVER RECIPIENT  DONOR;  Surgeon: Enoc Crews MD;  Location: [facility] OR       ALLERGIES:  Patient has no known allergies.    MEDS:    Current Outpatient Medications:     acetaminophen (TYLENOL) 325 MG tablet, Take 2 tablets (650 mg) by mouth every 8 hours as needed for other (For optimal non-opioid multimodal pain management to improve pain control.), Disp: , Rfl:     ciprofloxacin (CIPRO) 250 MG tablet, Take 1 tablet (250 mg) by mouth every 24 hours for 90 days, Disp: 90 tablet, Rfl: 0    ELIQUIS ANTICOAGULANT 5 MG tablet, Take 5 mg by mouth 2 times daily, Disp: , Rfl:     ferrous sulfate (FEROSUL) 325 (65 Fe) MG tablet, Take 1 tablet (325 mg)  by mouth daily for 180 days, Disp: 180 tablet, Rfl: 0    gabapentin (NEURONTIN) 100 MG capsule, Take 3 capsules (300 mg) by mouth at bedtime, Disp: 30 capsule, Rfl: 2    hydrOXYzine (ATARAX) 25 MG tablet, Take 1 tablet (25 mg) by mouth 3 times daily as needed for itching, Disp: 90 tablet, Rfl: 0    Lacosamide (VIMPAT) 100 MG TABS tablet, Take 1 tablet (100 mg) by mouth every evening, Disp: 31 tablet, Rfl: 5    lacosamide (VIMPAT) 50 MG TABS tablet, Take 1 tablet (50 mg) by mouth every morning, Disp: 93 tablet, Rfl: 3    melatonin 3 MG tablet, Take 1 tablet (3 mg) by mouth At Bedtime (Patient taking differently: Take 3 mg by mouth nightly as needed), Disp: 90 tablet, Rfl: 1    midodrine (PROAMATINE) 10 MG tablet, TAKE 1 TABLET 3 X DAILY. ON DIALYSIS DAYS(M, W, F) TAKE 2 EXTRA TABLETS 1 HOUR BEFORE, 1 TABLET WHEN YOU ARRIVE, AND 1 TABLET DURING DIALYSIS, Disp: 450 tablet, Rfl: 1    multivitamin RENAL (TRIPHROCAPS) 1 capsule capsule, Take 1 capsule by mouth daily, Disp: 30 capsule, Rfl: 3    sevelamer carbonate (RENVELA) 800 MG tablet, Take 800 mg by mouth 4 times daily With meals and snacks, Disp: , Rfl:     tacrolimus (GENERIC) 0.5 MG capsule, Take 1 capsule (0.5 mg) by mouth 2 times daily for 90 days, Disp: 180 capsule, Rfl: 0    SOCIAL HABITS:    History   Smoking Status    Never   Smokeless Tobacco    Never     Social History    Substance and Sexual Activity      Alcohol use: Not Currently        Alcohol/week: 0.0 standard drinks of alcohol      History   Drug Use No       FAMILY HISTORY:    Family History   Problem Relation Age of Onset    Coronary Artery Disease No family hx of     Cardiomyopathy No family hx of        PE:  /71 (BP Location: Right arm, Patient Position: Chair, Cuff Size: Adult Regular)   Pulse 72   Wt Readings from Last 1 Encounters:   01/16/24 193 lb 11.2 oz (87.9 kg)     There is no height or weight on file to calculate BMI.    EXAM:  GENERAL: well-developed 58 year old male who appears  his stated age  CARDIAC: normal   CHEST/LUNG: normal respiratory effort   MUSCULOSKELETAL: grossly normal and both lower extremities are intact, no lower extremity edema  NEUROLOGIC: focally intact, alert and oriented x 3  PSYCH: appropriate affect  VASCULAR:     DIAGNOSTIC STUDIES:     Images:  Echocardiogram Complete    Result Date: 2024  [id number]^LITTLE^CARMEN^LIZBETH  [facility]-3rd Floor [address] [place] [address]  Name: MARY JO CRAIN MRN: [id number] : 1965 Study Date: 2024 02:24 PM Age: 58 yrs Gender: Male Patient Location: [facility] Reason For Study: Status post liver transplant Ordering Physician: CARMEN FITCH Referring Physician: CARMEN FITCH Performed By: Halina Agustin  BSA: 2.1 m2 Height: 71 in Weight: 198 lb HR: 71 BP: 112/68 mmHg ______________________________________________________________________________ Procedure Echocardiogram with two-dimensional, color and spectral Doppler performed. ______________________________________________________________________________ Interpretation Summary Global and regional left ventricular function is normal with an EF of 60-65%. Global right ventricular function is normal. Mild mitral annular calcification is present. Mild aortic valve calcification and insufficiency present. Estimated mean right atrial pressure is normal. No pericardial effusion is present. ______________________________________________________________________________ Left Ventricle Left ventricular size is normal. Left ventricular wall thickness is normal. Global and regional left ventricular function is normal with an EF of 60-65%. No regional wall motion abnormalities are seen.  Right Ventricle The right ventricle is normal size. Global right ventricular function is normal.  Atria Both atria appear normal. The atrial septum is intact as assessed by color  Doppler .  Mitral Valve Mild mitral annular calcification is present. Trace mitral insufficiency is present.  Aortic Valve Mild aortic valve calcification is present. Mild aortic insufficiency is present. The peak aortic velocity is 2.3 m/sec. The mean gradient across the aortic valve is 11 mmHg.  Tricuspid Valve The tricuspid valve is normal.  Pulmonic Valve The pulmonic valve is normal.  Vessels The aorta root is normal. The inferior vena cava is normal. Estimated mean right atrial pressure is normal.  Pericardium No pericardial effusion is present.  Compared to Previous Study This study was compared with the study from 23 . No significant changes noted. ______________________________________________________________________________ MMode/2D Measurements & Calculations IVSd: 1.0 cm  LVIDd: 4.8 cm LVIDs: 3.5 cm LVPWd: 1.1 cm FS: 26.6 % LV mass(C)d: 185.1 grams LV mass(C)dI: 88.1 grams/m2 Ao root diam: 3.8 cm asc Aorta Diam: 3.7 cm LVOT diam: 2.2 cm LVOT area: 3.9 cm2 Ao root diam index Ht(cm/m): 2.1 Ao root diam index BSA (cm/m2): 1.8 Asc Ao diam index BSA (cm/m2): 1.8 Asc Ao diam index Ht(cm/m): 2.1 LA Volume (BP): 146.0 ml  LA Volume Index (BP): 69.5 ml/m2 RV Base: 4.3 cm RWT: 0.46 TAPSE: 2.0 cm  Doppler Measurements & Calculations MV E max baljeet: 74.6 cm/sec MV A max baljeet: 98.7 cm/sec MV E/A: 0.76 MV max P.9 mmHg MV mean P.3 mmHg MV V2 VTI: 31.2 cm MVA(VTI): 4.1 cm2 MV dec time: 0.21 sec Ao V2 max: 230.2 cm/sec Ao max P.2 mmHg Ao V2 mean: 154.2 cm/sec Ao mean P.1 mmHg Ao V2 VTI: 50.1 cm WOLFGANG(I,D): 2.6 cm2 WOLFGANG(V,D): 2.3 cm2 AI P1/2t: 379.0 msec LV V1 max P.1 mmHg LV V1 max: 132.9 cm/sec LV V1 VTI: 32.7 cm SV(LVOT): 127.8 ml SI(LVOT): 60.9 ml/m2 PA acc time: 0.08 sec AV Baljeet Ratio (DI): 0.58  WOLFGANG Index (cm2/m2): 1.2 E/E' av.4 Lateral E/e': 5.3 Medial E/e': 7.4 RV S Baljeet: 13.7 cm/sec  ______________________________________________________________________________ Report approved by: Cande  Tereso Tejada 01/11/2024 04:01 PM       XR Chest 2 Views [[id number]]    Result Date: 1/11/2024  Exam: XR CHEST 2 VIEWS, 1/11/2024 1:32 PM Indication: Status post liver transplant (H); Pre-transplant evaluation for kidney transplant; Cardiovascular disease; End stage renal disease (H); Liver dysfunction; Organ transplant candidate; Disease of lung Comparison: Chest radiograph 12/18/2023, chest fluoroscopy 1/3/2024, CT PE 10/5/2023 Findings: PA and lateral views of the chest. Stable placement of a right IJ approach tunneled central venous catheter with tip projecting to the right atrium. Trachea is midline. Cardiac silhouette is unchanged. No focal consolidations. Stable streaky opacities through the right base similar to prior. No significant pneumothorax. Mild blunting of the right costophrenic angle similar to prior. Left costophrenic angle is sharp.     Impression: 1. Stable right basilar scarring, best appreciated on CT CAP 10/5/2023. Superimposed pneumonia cannot be excluded. 2. Right IJ dialysis catheter terminates in the right atrium. 3. Persistent small right pleural effusion versus pleural thickening/scarring. I have personally reviewed the examination and initial interpretation and I agree with the findings. IRIS JOHN MD   SYSTEM ID:  [id number]    IR CVC Tunnel Placement > 5 Yrs of Age    Result Date: 1/5/2024  INTERVENTIONAL RADIOLOGY TUNNELED CATHETER PLACEMENT January 3, 2024 at 1203 hours INDICATION: Chronic intravenous access. Patient has had a recent right internal jugular tunneled dialysis catheter, recently removed. Patient has pseudoaneurysm arising from left upper arm fistula requiring surgical repair. Plan is now for replacement of tunneled catheter with recovery from surgery to be performed later today. Location: Right external jugular vein. PROCEDURE: Ultrasound guidance: Ultrasound was used to localize and document patency of the internal jugular vein. A permanent image of the vein was  recorded.  Maximal Sterile Barrier Technique Utilized: Cap and mask and sterile gown and sterile gloves and sterile full body drape and hand hygiene and skin preparation 2% chlorhexidine for cutaneous antisepsis (or acceptable alternative antiseptics).  Sterile Ultrasound Technique Utilized ?Sterile gel and sterile probe covers. Local anesthesia was administered to the skin over the vein and a 4 mm incision was made. Ultrasound was used to guide placement of a 5F dilator in the vein using standard technique.  Lidocaine was then administered in the subcutaneous tissue over the clavicle to a point about 5 cm below the mid clavicle.  A short incision was made at this point. A 23 cm catheter was then brought through the tract between the two incisions and inserted into the jugular vein through a peel away sheath. The catheter was secured in the subclavian region with two interrupted stitches of 2-0 polypropylene. The neck incision was closed with Dermabond adhesive.  Complications: None. Sedation: A moderate level of sedation was achieved with 50 mcg of IV fentanyl. Sedation time: 16 minutes. Vital signs and sedation monitored by our nursing staff under my supervision. Fluoroscopy time:  0.6 minutes. Air Kerma: 1.74 mGy. Contrast given:  None. Local anesthetic:  20 mL of 1% lidocaine. FINDINGS:  Fluoroscopic guidance with a permanent image confirmed the catheter tip location in the right atrium, confirmed with single spot fluoroscopic image.     IMPRESSION: Successful placement of right external jugular tunneled hemodialysis catheter. Patient had recent removal of a right internal jugular tunneled catheter with hematoma underlying subcutaneous tract. The new catheter may be used immediately. ABEBA SIMONS MD   SYSTEM ID:  [id number]    US Ext Arterial Venous Dialys Acs Graft    Result Date: 1/2/2024  [facility] 01/02/2024 EXAM: ULTRASOUND VENOUS HEMODIALYSIS STUDY LEFT UPPER EXTREMITY DATED  INDICATION:  End stage renal disease. TECHNIQUE: Ultrasound examination of the upper extremity arteries and veins  was performed, including gray-scale, color, and Doppler waveform analysis. LEFT UPPER EXTREMITY FINDINGS : There is a hematoma along the left humerus measuring 20 x 6 x 4 cm likely representing a hematoma. There is some internal flow within the hematoma consistent with a 5.2 x 2.5 x 2.3 cm pseudoaneurysm likely from previous needle access site during dialysis. Otherwise the left left radial to cephalic AV fistula is patent.     IMPRESSION: 1. There is a 20 x 6 x 4 cm hematoma within the left distal arm at the level of the distal humerus. There is a pseudoaneurysm measuring 5.2 x 2.5 x 2.3 cm within the hematoma right above the antecubital fossa likely from previous needle access site from dialysis.    LABS:      Sodium   Date Value Ref Range Status   01/11/2024 135 135 - 145 mmol/L Final     Comment:     Reference intervals for this test were updated on 09/26/2023 to more accurately reflect our healthy population. There may be differences in the flagging of prior results with similar values performed with this method. Interpretation of those prior results can be made in the context of the updated reference intervals.    01/06/2024 127 (L) 135 - 145 mmol/L Final     Comment:     Reference intervals for this test were updated on 09/26/2023 to more accurately reflect our healthy population. There may be differences in the flagging of prior results with similar values performed with this method. Interpretation of those prior results can be made in the context of the updated reference intervals.    01/03/2024 134 (L) 135 - 145 mmol/L Final     Comment:     Reference intervals for this test were updated on 09/26/2023 to more accurately reflect our healthy population. There may be differences in the flagging of prior results with similar values performed with this method. Interpretation of those prior results  can be made in the context of the updated reference intervals.    04/20/2020 139 133 - 144 mmol/L Final   10/07/2019 131 (L) 133 - 144 mmol/L Final   02/20/2019 137 133 - 144 mmol/L Final     Urea Nitrogen   Date Value Ref Range Status   01/11/2024 41.2 (H) 8.0 - 23.0 mg/dL Final   01/06/2024 55.5 (H) 8.0 - 23.0 mg/dL Final   01/03/2024 54.6 (H) 8.0 - 23.0 mg/dL Final   12/29/2022 46 (H) 7 - 30 mg/dL Final   12/28/2022 62 (H) 7 - 30 mg/dL Final   12/27/2022 52 (H) 7 - 30 mg/dL Final   04/20/2020 23 7 - 30 mg/dL Final   10/07/2019 18 7 - 30 mg/dL Final   02/20/2019 20 7 - 30 mg/dL Final     Hemoglobin   Date Value Ref Range Status   01/16/2024 8.7 (L) 13.3 - 17.7 g/dL Final   01/11/2024 8.7 (L) 13.3 - 17.7 g/dL Final   01/06/2024 7.8 (L) 13.3 - 17.7 g/dL Final   04/20/2020 14.3 13.3 - 17.7 g/dL Final   10/07/2019 13.7 13.3 - 17.7 g/dL Final   02/20/2019 13.7 13.3 - 17.7 g/dL Final     Platelet Count   Date Value Ref Range Status   01/16/2024 125 (L) 150 - 450 10e3/uL Final   01/11/2024 115 (L) 150 - 450 10e3/uL Final   01/06/2024 79 (L) 150 - 450 10e3/uL Final   04/20/2020 108 (L) 150 - 450 10e9/L Final   10/07/2019 194 150 - 450 10e9/L Final   02/20/2019 125 (L) 150 - 450 10e9/L Final     INR   Date Value Ref Range Status   10/27/2023 1.67 (H) 0.85 - 1.15 Final   10/26/2023 1.70 (H) 0.85 - 1.15 Final   10/17/2023 2.00 (H) 0.85 - 1.15 Final   10/07/2019 1.20 (H) 0.86 - 1.14 Final   10/17/2017 1.26 (H) 0.86 - 1.14 Final   09/19/2017 1.15 (H) 0.86 - 1.14 Final       30 minutes spent on the day of encounter doing chart review, history and exam, documentation, and further activities as noted.     Saima Coker NP  VASCULAR SURGERY

## 2024-01-29 DIAGNOSIS — D84.9 ACQUIRED IMMUNOCOMPROMISED STATE (H): Primary | ICD-10-CM

## 2024-01-30 ENCOUNTER — HOSPITAL ENCOUNTER (OUTPATIENT)
Dept: CARDIAC REHAB | Facility: CLINIC | Age: 59
Discharge: HOME | End: 2024-01-30
Attending: STUDENT IN AN ORGANIZED HEALTH CARE EDUCATION/TRAINING PROGRAM
Payer: COMMERCIAL

## 2024-01-30 ENCOUNTER — TEAM CONFERENCE (OUTPATIENT)
Dept: TRANSPLANT | Facility: CLINIC | Age: 59
End: 2024-01-30

## 2024-01-30 ENCOUNTER — ANCILLARY PROCEDURE (OUTPATIENT)
Dept: ULTRASOUND IMAGING | Facility: CLINIC | Age: 59
End: 2024-01-30
Attending: NURSE PRACTITIONER
Payer: COMMERCIAL

## 2024-01-30 DIAGNOSIS — Z76.82 ORGAN TRANSPLANT CANDIDATE: ICD-10-CM

## 2024-01-30 DIAGNOSIS — N18.6 ESRD (END STAGE RENAL DISEASE) (H): ICD-10-CM

## 2024-01-30 DIAGNOSIS — I10 HYPERTENSION: ICD-10-CM

## 2024-01-30 PROCEDURE — 93978 VASCULAR STUDY: CPT | Performed by: RADIOLOGY

## 2024-01-30 PROCEDURE — 93798 PHYS/QHP OP CAR RHAB W/ECG: CPT

## 2024-01-30 RX ORDER — APIXABAN 5 MG/1
5 TABLET, FILM COATED ORAL 2 TIMES DAILY
Qty: 180 TABLET | Refills: 1 | Status: SHIPPED | OUTPATIENT
Start: 2024-01-30 | End: 2024-06-13

## 2024-01-30 NOTE — TELEPHONE ENCOUNTER
Image Review Meeting    ATTENDEES: Dr. Onofre     IMAGES REVIEWED: Ab/Pelv CT 10/31/24, CT Chest PE Abdomen 10/5/23, iliac US 1/30/24    DECISION: Pt has adequate vessels for kidney transplant     INCIDENTALS: Yes:     Ab/Pelv CT 10/31/23: Lymph nodes: New enlarged right inguinal lymph node measuring 20 mm, and 13 mm. Stable prominent mesenteric lymph nodes and associated hazy  appearance of the mesenteric fat. - will get EBV DNA PCR to r/o PTLD     CT Chest PE 10/5/23: Multiple upper abdominal collaterals and splenomegaly suggestive of portal hypertension, with small to moderate abdominopelvic ascites. Pt will need to undergo definitive work up of portal HTN: will need Transjugular liver biopsy, Liver US w/ dopplers of IVC/portal vein

## 2024-01-31 ENCOUNTER — TELEPHONE (OUTPATIENT)
Dept: TRANSPLANT | Facility: CLINIC | Age: 59
End: 2024-01-31
Payer: COMMERCIAL

## 2024-02-01 ENCOUNTER — TELEPHONE (OUTPATIENT)
Dept: TRANSPLANT | Facility: CLINIC | Age: 59
End: 2024-02-01
Payer: COMMERCIAL

## 2024-02-01 DIAGNOSIS — I25.10 CARDIOVASCULAR DISEASE: ICD-10-CM

## 2024-02-01 DIAGNOSIS — Z01.818 PRE-TRANSPLANT EVALUATION FOR KIDNEY TRANSPLANT: ICD-10-CM

## 2024-02-01 DIAGNOSIS — N18.6 END STAGE RENAL DISEASE (H): ICD-10-CM

## 2024-02-01 DIAGNOSIS — I10 ESSENTIAL HYPERTENSION: ICD-10-CM

## 2024-02-01 DIAGNOSIS — E78.5 HYPERLIPIDEMIA: ICD-10-CM

## 2024-02-01 DIAGNOSIS — Z76.82 ORGAN TRANSPLANT CANDIDATE: ICD-10-CM

## 2024-02-01 DIAGNOSIS — Z94.4 STATUS POST LIVER TRANSPLANT (H): Primary | ICD-10-CM

## 2024-02-01 NOTE — TELEPHONE ENCOUNTER
Called pt to discuss outcome of Imaging review. Requested return call to direct line. Orders placed.

## 2024-02-06 ENCOUNTER — HOSPITAL ENCOUNTER (OUTPATIENT)
Dept: CARDIAC REHAB | Facility: CLINIC | Age: 59
Discharge: HOME | End: 2024-02-06
Attending: STUDENT IN AN ORGANIZED HEALTH CARE EDUCATION/TRAINING PROGRAM
Payer: COMMERCIAL

## 2024-02-06 PROCEDURE — 93798 PHYS/QHP OP CAR RHAB W/ECG: CPT | Performed by: REHABILITATION PRACTITIONER

## 2024-02-07 ENCOUNTER — TELEPHONE (OUTPATIENT)
Dept: FAMILY MEDICINE | Facility: CLINIC | Age: 59
End: 2024-02-07
Payer: COMMERCIAL

## 2024-02-07 ENCOUNTER — TELEPHONE (OUTPATIENT)
Dept: OTHER | Facility: CLINIC | Age: 59
End: 2024-02-07
Payer: COMMERCIAL

## 2024-02-07 DIAGNOSIS — K74.60 CIRRHOSIS OF LIVER WITH ASCITES, UNSPECIFIED HEPATIC CIRRHOSIS TYPE (H): Primary | ICD-10-CM

## 2024-02-07 DIAGNOSIS — R18.8 CIRRHOSIS OF LIVER WITH ASCITES, UNSPECIFIED HEPATIC CIRRHOSIS TYPE (H): Primary | ICD-10-CM

## 2024-02-07 RX ORDER — CIPROFLOXACIN 250 MG/1
250 TABLET, FILM COATED ORAL DAILY
Qty: 90 TABLET | Refills: 0 | Status: SHIPPED | OUTPATIENT
Start: 2024-02-07 | End: 2024-04-22

## 2024-02-07 RX ORDER — TACROLIMUS 0.5 MG/1
0.5 CAPSULE ORAL 2 TIMES DAILY
Qty: 180 CAPSULE | Refills: 0 | Status: SHIPPED | OUTPATIENT
Start: 2024-02-07 | End: 2024-05-03 | Stop reason: DRUGHIGH

## 2024-02-07 NOTE — TELEPHONE ENCOUNTER
Patient needs to be scheduled for AVF US and in person follow up with Saima Coker NP next week.    Appt note: 4 week follow up to 1/17/24. AVF US prior    Pippa ROBISON RN    [facility]  [facility]  Office: [phone]  Fax: [phone]

## 2024-02-07 NOTE — TELEPHONE ENCOUNTER
Patients wife called requesting refills for medications below since transplant provider has not responded. Wife states PCP is aware pt is on medications pt is on.     Wife will need a call back with providers response. CTC on file.     Routing refill request to provider for review/approval because:  Medication is reported/historical

## 2024-02-08 ENCOUNTER — HOSPITAL ENCOUNTER (OUTPATIENT)
Dept: CARDIAC REHAB | Facility: CLINIC | Age: 59
Discharge: HOME | End: 2024-02-08
Attending: STUDENT IN AN ORGANIZED HEALTH CARE EDUCATION/TRAINING PROGRAM
Payer: COMMERCIAL

## 2024-02-08 PROCEDURE — 93798 PHYS/QHP OP CAR RHAB W/ECG: CPT | Performed by: REHABILITATION PRACTITIONER

## 2024-02-09 ENCOUNTER — TELEPHONE (OUTPATIENT)
Dept: TRANSPLANT | Facility: CLINIC | Age: 59
End: 2024-02-09
Payer: COMMERCIAL

## 2024-02-09 NOTE — TELEPHONE ENCOUNTER
Left VM for pt explaining that we have requested additional tests of his liver - US and transjugular biopsy as part of his pre-kidney transplant evaluation. Let him know that scheduling will reach out to arrange. Provided direct line for follow up questions.

## 2024-02-09 NOTE — TELEPHONE ENCOUNTER
Left voicemail with instructions for patient to call back to schedule their appointment(s)    February 9, 2024 , 12:06 PM

## 2024-02-12 ENCOUNTER — TELEPHONE (OUTPATIENT)
Dept: FAMILY MEDICINE | Facility: CLINIC | Age: 59
End: 2024-02-12
Payer: COMMERCIAL

## 2024-02-12 ENCOUNTER — TELEPHONE (OUTPATIENT)
Dept: TRANSPLANT | Facility: CLINIC | Age: 59
End: 2024-02-12
Payer: COMMERCIAL

## 2024-02-12 DIAGNOSIS — Z76.82 AWAITING ORGAN TRANSPLANT: Primary | ICD-10-CM

## 2024-02-12 DIAGNOSIS — T82.898A OTHER SPECIFIED COMPLICATION OF VASCULAR PROSTHETIC DEVICES, IMPLANTS AND GRAFTS, INITIAL ENCOUNTER (H): Primary | ICD-10-CM

## 2024-02-12 NOTE — TELEPHONE ENCOUNTER
Pt and wife called to discuss pre-kidney transplant evaluation. We discussed transjugular biopsy and US of liver to further assess liver status. They had no further questions. They stated the pt will be in [place] when he is supposed to follow up w/ Stephania post-angiogram. We are working with scheduling to reschedule. They state they are working w/ Cardiology to determine if he needs to remain on Eliquis. He has a visit coming up on 2/15/24. Provided phone number to schedule w/ IR. They are scheduled for virtual transplant class and had no further questions.

## 2024-02-13 ENCOUNTER — HOSPITAL ENCOUNTER (OUTPATIENT)
Dept: CARDIAC REHAB | Facility: CLINIC | Age: 59
Discharge: HOME | End: 2024-02-13
Attending: STUDENT IN AN ORGANIZED HEALTH CARE EDUCATION/TRAINING PROGRAM
Payer: COMMERCIAL

## 2024-02-13 PROCEDURE — 93798 PHYS/QHP OP CAR RHAB W/ECG: CPT | Performed by: REHABILITATION PRACTITIONER

## 2024-02-13 RX ORDER — OXYCODONE HYDROCHLORIDE 5 MG/1
5 TABLET ORAL EVERY 6 HOURS PRN
Qty: 12 TABLET | Refills: 0 | Status: SHIPPED | OUTPATIENT
Start: 2024-02-13 | End: 2024-02-27

## 2024-02-13 NOTE — TELEPHONE ENCOUNTER
Placed call to number specified by patient [phone] - patient's wife answered and asked that triage call patient at home number. Placed call to home number - no answer (no approval for detailed VM on home number)    Patient Contact    Attempt # 1    Was call answered?  No.  Left message on voicemail with information to call triage back.    On callback - notify of short term script of oxycodone sent to pharmacy by PCP     Italo Soliman RN  [facility]

## 2024-02-13 NOTE — TELEPHONE ENCOUNTER
Disp Refills Start End GELACIO   oxyCODONE (ROXICODONE) 5 MG tablet 12 tablet 0 2/13/2024 -- No   Sig - Route: Take 1 tablet (5 mg) by mouth every 6 hours as needed for severe pain - Oral   Sent to pharmacy as: oxyCODONE HCl 5 MG Oral Tablet (ROXICODONE)   Class: E-Prescribe   Earliest Fill Date: 2/13/2024   Order: [id number]   E-Prescribing Status: Receipt confirmed by pharmacy (2/13/2024  7:02 AM CST)     Outpatient Morphine Milligram Equivalents Per Day    2/13/24 and after   30 MME/Day  Order Name Dose Route Frequency Maximum MME/Day    oxyCODONE (ROXICODONE) 5 MG tablet 5 mg Oral EVERY 6 HOURS PRN 30 MME/Day   Total Potential Morphine Milligram Equivalents Per Day 30 MME/Day   Calculation Information          Printout Tracking    External Result Report     Pharmacy    [id number] - [place], [place] - [address] AT [facility] [address]     Pt called to check on this, notified of Rx approved     Brooklyn Guerrero RN  [facility]

## 2024-02-14 NOTE — PROGRESS NOTES
~Cardiology Clinic Visit~    Primary Cardiologist: Dr. Abreu  Reason for visit: Routine follow up  H&P Coronary Angiogram    History of Present Illness    Blanco Osborne is a very pleasant 58 year old male with a past medical history notable for Patient is 57 years old with alcoholic cirrhosis status post liver transplant in 2016, type 2 diabetes, chronic immunosuppression, PAF, previous stroke, hypertension, seizure disorder, obstructive sleep apnea on BiPAP following respiratory arrest in November 2022 which ended with end-stage renal disease needing dialysis.        He has been referred to cardiology following recent hospitalization where he was noted to have paroxysmal atrial fibrillation and appropriately started on anticoagulation for stroke prophylaxis, and consulted with Dr. Abreu on 5/4/23.  In the past, he has been seen by Dr. Morales at [facility] cardiology in 2016.      He was hospitalized for 3-months from 1/21/23 - 4/28/23.  He was recovering in long-term care at [facility] and transferred to [facility] on 1/21/2023 for evaluation of loculated pleural effusion.  During this he had acute metabolic encephalopathy with delirium, chronic hepatic encephalopathy, bilateral pleural effusions and acute respiratory failure requiring tracheostomy, ARDS, pneumonia.  He had multispecialty consultation including thoracic surgery, infectious diseases, pulmonology, neurology.  He was treated with antifungal therapy.  Had subacute bacterial peritonitis that resolved.      During all of this he had episodes of paroxysmal atrial fibrillation. But he is chronically anticoagulated on apixaban and currently in sinus rhythm at 65 bpm. He cannot tolerate rate controlling medication such as beta-blocker due to low resting blood pressure in the 100s systolic.      Echocardiogram in March 2023 showed normal LVEF, no significant valve disease, no obvious vegetations, but with note of a small posterior  pericardial effusion without tamponade.  We followed with many serial echocardiograms since the initial effusion was discovered.  With this, the effusion continued to develop, and eventually he returned to [facility] for pericardiocentesis on 9/29/23.  Repeat echocardiogram 11/6/23 stable with no recurrent effusion.    Patient has no new cardiac concerns to discuss today.  He denies SOB, chest pain or pressure, palpitations, orthopnea, PND.  He has had no falls or syncope.  He is currently undergoing workup for renal transplant.  He mentions that a friend is willing to donate a kidney, if he is not a match, the kidney will go to another recipient and Blanco will be listed top of the transplant list.  He is very optimistic about this.  __________________________________________________________________         Assessment and Impression:     1.  Paroxysmal atrial fibrillation.  Currently in sinus rhythm.  Appropriately anticoagulated with apixaban.  Not a candidate for AV slowing drugs due to resting hypotension.  2.  Complex alcoholic liver cirrhosis, status post liver transplant in 2016, chronically immunosuppressed.  3.  End-stage renal disease, dialysis via PCAD on MWF, new AVF.  4.  Type 2 diabetes mellitus.  5.  Significant physical debility secondary to recent prolonged 3-month hospitalization for respiratory failure.  6. Pericardial effusion, resolved per recent echocardiogram.         Recommendations and Plan:     No medication changes today.  We discussed stopping Eliquis, and for the time being, it makes the most sense for him to continue this therapy to reduce the risk of stroke should he have recurrent paroxysmal atrial fibrillation.  As a courtesy, Risks and benefits of coronary angiogram discussed today including, bleeding, bruising, infection, allergic reaction, kidney damage (including need for dialysis), stroke, heart attack, vascular damage, emergency open heart surgery, up to and including death.  Patient  indicates understanding and is agreeable to proceed.  Follow up with Dr. Abreu in 6-months for routine visit.  Down the road, once testing/procedures/surgeries are completed, would consider heart monitor for AF reassessment and evaluation of future need for anticoagulation.  Post-angiogram cardiology clearance for renal transplant should be completed with his transplant team at [facility].  __________________________________________________________________    Thank you for the opportunity to participate in this pleasant patient's care.    We would be happy to see this patient sooner for any concerns in the meantime.    STEFFANY Vizcarra, CNP   Nurse Practitioner  [facility]    Today's clinic visit entailed:  Review of the result(s) of each unique test - cardiac testing, cardiac imaging.  Care coordination completed with patient's trans plant coordinator staff (Tamara); labs reviewed.  Prescription drug management    The level of medical decision making during this visit was of moderate complexity.    Orders this Visit:  Orders Placed This Encounter   Procedures    Follow-Up with Cardiology     No orders of the defined types were placed in this encounter.    There are no discontinued medications.  Encounter Diagnoses   Name Primary?    Pericardial effusion     Cardiovascular disease Yes    Pre-transplant evaluation for ESRD (end stage renal disease)     Status post liver transplant (H)     End stage renal disease (H)     PAF (paroxysmal atrial fibrillation) (H)     CHRISTIAN on CPAP      CURRENT MEDICATIONS:  Current Outpatient Medications   Medication Sig Dispense Refill    acetaminophen (TYLENOL) 325 MG tablet Take 2 tablets (650 mg) by mouth every 8 hours as needed for other (For optimal non-opioid multimodal pain management to improve pain control.)      ciprofloxacin (CIPRO) 250 MG tablet Take 1 tablet (250 mg) by mouth daily 90 tablet 0    ELIQUIS ANTICOAGULANT 5 MG tablet Take 1 tablet (5 mg)  "by mouth 2 times daily 180 tablet 1    ferrous sulfate (FEROSUL) 325 (65 Fe) MG tablet Take 1 tablet (325 mg) by mouth daily for 180 days 180 tablet 0    gabapentin (NEURONTIN) 100 MG capsule Take 3 capsules (300 mg) by mouth at bedtime 30 capsule 2    hydrOXYzine (ATARAX) 25 MG tablet Take 1 tablet (25 mg) by mouth 3 times daily as needed for itching 90 tablet 0    Lacosamide (VIMPAT) 100 MG TABS tablet Take 1 tablet (100 mg) by mouth every evening 31 tablet 5    lacosamide (VIMPAT) 50 MG TABS tablet Take 1 tablet (50 mg) by mouth every morning 93 tablet 3    melatonin 3 MG tablet Take 1 tablet (3 mg) by mouth At Bedtime (Patient taking differently: Take 3 mg by mouth nightly as needed) 90 tablet 1    midodrine (PROAMATINE) 10 MG tablet TAKE 1 TABLET 3 X DAILY. ON DIALYSIS DAYS(M, W, F) TAKE 2 EXTRA TABLETS 1 HOUR BEFORE, 1 TABLET WHEN YOU ARRIVE, AND 1 TABLET DURING DIALYSIS 450 tablet 1    multivitamin RENAL (TRIPHROCAPS) 1 capsule capsule Take 1 capsule by mouth daily 30 capsule 3    oxyCODONE (ROXICODONE) 5 MG tablet Take 1 tablet (5 mg) by mouth every 6 hours as needed for severe pain 12 tablet 0    sevelamer carbonate (RENVELA) 800 MG tablet Take 800 mg by mouth 4 times daily With meals and snacks      tacrolimus (GENERIC) 0.5 MG capsule Take 1 capsule (0.5 mg) by mouth 2 times daily 180 capsule 0     ALLERGIES   No Known Allergies  PAST MEDICAL, SURGICAL, FAMILY, SOCIAL HISTORY:  History was reviewed and updated as needed, see medical record.    Review of Systems:  A 10-point Review Of Systems is otherwise normal except for that which is noted in the HPI and interval summary.    Physical Exam:    Vitals: /70   Pulse 83   Ht 1.803 m (5' 11\")   Wt 90.3 kg (199 lb)   SpO2 97%   BMI 27.75 kg/m    Constitutional: Appears stated age, well nourished, NAD.  Neck: Supple. Carotid pulses full and equal.   Respiratory: Non-labored. Lungs CTAB.  Cardiovascular: RRR, normal S1 and S2. No M/G/R.  No edema.  GI: " Soft, non-distended, non-tender.  Skin: Warm and dry.   Musculoskeletal/Extremities: Symmetrical movement.  Neurologic: No gross focal deficits. Alert, awake.  Psychiatric: Affect appropriate. Mentation normal.    Recent Lab Results:  LIPID RESULTS:  Lab Results   Component Value Date    CHOL 82 02/15/2024    CHOL 161 04/20/2020    HDL 43 02/15/2024    HDL 42 04/20/2020    LDL 33 02/15/2024    LDL 81 04/20/2020    TRIG 31 02/15/2024    TRIG 192 (H) 04/20/2020     LIVER ENZYME RESULTS:  Lab Results   Component Value Date    AST 28 01/11/2024     (H) 04/20/2020    ALT <5 01/11/2024    ALT 72 (H) 04/20/2020     CBC RESULTS:  Lab Results   Component Value Date    WBC 4.3 01/16/2024    WBC 4.0 04/20/2020    RBC 2.83 (L) 01/16/2024    RBC 3.95 (L) 04/20/2020    HGB 8.7 (L) 01/16/2024    HGB 14.3 04/20/2020    HCT 28.6 (L) 01/16/2024    HCT 43.1 04/20/2020     (H) 01/16/2024     (H) 04/20/2020    MCH 30.7 01/16/2024    MCH 36.2 (H) 04/20/2020    MCHC 30.4 (L) 01/16/2024    MCHC 33.2 04/20/2020    RDW 17.4 (H) 01/16/2024    RDW 12.7 04/20/2020     (L) 01/16/2024     (L) 04/20/2020     BMP RESULTS:  Lab Results   Component Value Date     01/11/2024     04/20/2020    POTASSIUM 4.2 01/11/2024    POTASSIUM 3.6 10/05/2023    POTASSIUM 3.4 12/29/2022    POTASSIUM 3.9 04/20/2020    CHLORIDE 92 (L) 01/11/2024    CHLORIDE 103 12/29/2022    CHLORIDE 105 08/05/2020    CHLORIDE 106 04/20/2020    CO2 30 (H) 01/11/2024    CO2 31 12/29/2022    CO2 28 04/20/2020    ANIONGAP 13 01/11/2024    ANIONGAP 3 12/29/2022    ANIONGAP 6 04/20/2020     (H) 01/11/2024     (H) 01/06/2024     (H) 12/29/2022    GLC 93 04/20/2020    BUN 41.2 (H) 01/11/2024    BUN 46 (H) 12/29/2022    BUN 23 04/20/2020    CR 5.25 (H) 01/11/2024    CR 1.06 04/20/2020    GFRESTIMATED 12 (L) 01/11/2024    GFRESTIMATED 78 04/20/2020    GFRESTBLACK >90 04/20/2020    KELLY 9.5 01/11/2024    KELLY 9.2 04/20/2020       A1C RESULTS:  Lab Results   Component Value Date    A1C 5.0 01/11/2024    A1C 4.8 09/22/2016     INR RESULTS:  Lab Results   Component Value Date    INR 1.67 (H) 10/27/2023    INR 1.70 (H) 10/26/2023    INR 1.20 (H) 10/07/2019    INR 1.26 (H) 10/17/2017

## 2024-02-15 ENCOUNTER — HOSPITAL ENCOUNTER (OUTPATIENT)
Dept: CARDIAC REHAB | Facility: CLINIC | Age: 59
Discharge: HOME | End: 2024-02-15
Attending: STUDENT IN AN ORGANIZED HEALTH CARE EDUCATION/TRAINING PROGRAM
Payer: COMMERCIAL

## 2024-02-15 ENCOUNTER — LAB (OUTPATIENT)
Dept: LAB | Facility: CLINIC | Age: 59
End: 2024-02-15
Attending: SURGERY
Payer: COMMERCIAL

## 2024-02-15 ENCOUNTER — ANCILLARY PROCEDURE (OUTPATIENT)
Dept: ULTRASOUND IMAGING | Facility: CLINIC | Age: 59
End: 2024-02-15
Attending: SURGERY
Payer: COMMERCIAL

## 2024-02-15 ENCOUNTER — OFFICE VISIT (OUTPATIENT)
Dept: CARDIOLOGY | Facility: CLINIC | Age: 59
End: 2024-02-15
Attending: NURSE PRACTITIONER
Payer: COMMERCIAL

## 2024-02-15 VITALS
HEART RATE: 83 BPM | WEIGHT: 199 LBS | BODY MASS INDEX: 27.86 KG/M2 | SYSTOLIC BLOOD PRESSURE: 112 MMHG | HEIGHT: 71 IN | OXYGEN SATURATION: 97 % | DIASTOLIC BLOOD PRESSURE: 70 MMHG

## 2024-02-15 DIAGNOSIS — N18.6 ESRD (END STAGE RENAL DISEASE) (H): ICD-10-CM

## 2024-02-15 DIAGNOSIS — Z94.4 STATUS POST LIVER TRANSPLANT (H): ICD-10-CM

## 2024-02-15 DIAGNOSIS — I10 ESSENTIAL HYPERTENSION: ICD-10-CM

## 2024-02-15 DIAGNOSIS — I48.0 PAF (PAROXYSMAL ATRIAL FIBRILLATION) (H): ICD-10-CM

## 2024-02-15 DIAGNOSIS — G47.33 OSA ON CPAP: ICD-10-CM

## 2024-02-15 DIAGNOSIS — N18.6 END STAGE RENAL DISEASE (H): ICD-10-CM

## 2024-02-15 DIAGNOSIS — E78.5 HYPERLIPIDEMIA: ICD-10-CM

## 2024-02-15 DIAGNOSIS — I25.10 CARDIOVASCULAR DISEASE: Primary | ICD-10-CM

## 2024-02-15 DIAGNOSIS — Z01.818 PRE-TRANSPLANT EVALUATION FOR KIDNEY TRANSPLANT: ICD-10-CM

## 2024-02-15 DIAGNOSIS — Z01.818 PRE-TRANSPLANT EVALUATION FOR ESRD (END STAGE RENAL DISEASE): ICD-10-CM

## 2024-02-15 DIAGNOSIS — I25.10 CARDIOVASCULAR DISEASE: ICD-10-CM

## 2024-02-15 DIAGNOSIS — Z76.82 ORGAN TRANSPLANT CANDIDATE: ICD-10-CM

## 2024-02-15 DIAGNOSIS — Z94.4 LIVER REPLACED BY TRANSPLANT (H): ICD-10-CM

## 2024-02-15 DIAGNOSIS — I10 HYPERTENSION: ICD-10-CM

## 2024-02-15 DIAGNOSIS — Z13.220 LIPID SCREENING: ICD-10-CM

## 2024-02-15 DIAGNOSIS — I31.39 PERICARDIAL EFFUSION: ICD-10-CM

## 2024-02-15 LAB
CHOLEST SERPL-MCNC: 82 MG/DL
FASTING STATUS PATIENT QL REPORTED: YES
HDLC SERPL-MCNC: 43 MG/DL
LDLC SERPL CALC-MCNC: 33 MG/DL
NONHDLC SERPL-MCNC: 39 MG/DL
T PALLIDUM AB SER QL: NONREACTIVE
TRIGL SERPL-MCNC: 31 MG/DL

## 2024-02-15 PROCEDURE — 76705 ECHO EXAM OF ABDOMEN: CPT | Mod: GC | Performed by: RADIOLOGY

## 2024-02-15 PROCEDURE — 80061 LIPID PANEL: CPT | Performed by: PATHOLOGY

## 2024-02-15 PROCEDURE — 99000 SPECIMEN HANDLING OFFICE-LAB: CPT | Performed by: PATHOLOGY

## 2024-02-15 PROCEDURE — 93798 PHYS/QHP OP CAR RHAB W/ECG: CPT

## 2024-02-15 PROCEDURE — 87799 DETECT AGENT NOS DNA QUANT: CPT | Performed by: SURGERY

## 2024-02-15 PROCEDURE — 99214 OFFICE O/P EST MOD 30 MIN: CPT | Performed by: NURSE PRACTITIONER

## 2024-02-15 PROCEDURE — 36415 COLL VENOUS BLD VENIPUNCTURE: CPT | Performed by: PATHOLOGY

## 2024-02-15 PROCEDURE — 93976 VASCULAR STUDY: CPT | Mod: GC | Performed by: RADIOLOGY

## 2024-02-15 PROCEDURE — 86780 TREPONEMA PALLIDUM: CPT | Performed by: NURSE PRACTITIONER

## 2024-02-15 NOTE — LETTER
2/15/2024    Ki Carter PA-C  [address]  [facility] [address]    RE: Blanco Osborne       Dear Colleague,     I had the pleasure of seeing Blanco Osborne in the [facility].              ~Cardiology Clinic Visit~    Primary Cardiologist: Dr. Abreu  Reason for visit: Routine follow up  H&P Coronary Angiogram    History of Present Illness    Blanco Osborne is a very pleasant 58 year old male with a past medical history notable for Patient is 57 years old with alcoholic cirrhosis status post liver transplant in 2016, type 2 diabetes, chronic immunosuppression, PAF, previous stroke, hypertension, seizure disorder, obstructive sleep apnea on BiPAP following respiratory arrest in November 2022 which ended with end-stage renal disease needing dialysis.        He has been referred to cardiology following recent hospitalization where he was noted to have paroxysmal atrial fibrillation and appropriately started on anticoagulation for stroke prophylaxis, and consulted with Dr. Abreu on 5/4/23.  In the past, he has been seen by Dr. Morales at [facility] cardiology in 2016.      He was hospitalized for 3-months from 1/21/23 - 4/28/23.  He was recovering in long-term care at [place] and transferred to [facility] on 1/21/2023 for evaluation of loculated pleural effusion.  During this he had acute metabolic encephalopathy with delirium, chronic hepatic encephalopathy, bilateral pleural effusions and acute respiratory failure requiring tracheostomy, ARDS, pneumonia.  He had multispecialty consultation including thoracic surgery, infectious diseases, pulmonology, neurology.  He was treated with antifungal therapy.  Had subacute bacterial peritonitis that resolved.      During all of this he had episodes of paroxysmal atrial fibrillation. But he is chronically anticoagulated on apixaban and currently in sinus rhythm at 65 bpm. He cannot tolerate rate controlling medication such as  beta-blocker due to low resting blood pressure in the 100s systolic.      Echocardiogram in March 2023 showed normal LVEF, no significant valve disease, no obvious vegetations, but with note of a small posterior pericardial effusion without tamponade.  We followed with many serial echocardiograms since the initial effusion was discovered.  With this, the effusion continued to develop, and eventually he returned to [facility] for pericardiocentesis on 9/29/23.  Repeat echocardiogram 11/6/23 stable with no recurrent effusion.    Patient has no new cardiac concerns to discuss today.  He denies SOB, chest pain or pressure, palpitations, orthopnea, PND.  He has had no falls or syncope.  He is currently undergoing workup for renal transplant.  He mentions that a friend is willing to donate a kidney, if he is not a match, the kidney will go to another recipient and Blanco will be listed top of the transplant list.  He is very optimistic about this.  __________________________________________________________________         Assessment and Impression:     1.  Paroxysmal atrial fibrillation.  Currently in sinus rhythm.  Appropriately anticoagulated with apixaban.  Not a candidate for AV slowing drugs due to resting hypotension.  2.  Complex alcoholic liver cirrhosis, status post liver transplant in 2016, chronically immunosuppressed.  3.  End-stage renal disease, dialysis via PCAD on MWF, new AVF.  4.  Type 2 diabetes mellitus.  5.  Significant physical debility secondary to recent prolonged 3-month hospitalization for respiratory failure.  6. Pericardial effusion, resolved per recent echocardiogram.         Recommendations and Plan:     No medication changes today.  We discussed stopping Eliquis, and for the time being, it makes the most sense for him to continue this therapy to reduce the risk of stroke should he have recurrent paroxysmal atrial fibrillation.  As a courtesy, Risks and benefits of coronary angiogram discussed today  including, bleeding, bruising, infection, allergic reaction, kidney damage (including need for dialysis), stroke, heart attack, vascular damage, emergency open heart surgery, up to and including death.  Patient indicates understanding and is agreeable to proceed.  Follow up with Dr. Abreu in 6-months for routine visit.  Down the road, once testing/procedures/surgeries are completed, would consider heart monitor for AF reassessment and evaluation of future need for anticoagulation.  Post-angiogram cardiology clearance for renal transplant should be completed with his transplant team at [facility].  __________________________________________________________________    Thank you for the opportunity to participate in this pleasant patient's care.    We would be happy to see this patient sooner for any concerns in the meantime.    STEFFANY Vizcarra, CNP   Nurse Practitioner  [facility] [place] [facility]    Today's clinic visit entailed:  Review of the result(s) of each unique test - cardiac testing, cardiac imaging.  Care coordination completed with patient's trans plant coordinator staff (Tamara); labs reviewed.  Prescription drug management    The level of medical decision making during this visit was of moderate complexity.    Orders this Visit:  Orders Placed This Encounter   Procedures    Follow-Up with Cardiology     No orders of the defined types were placed in this encounter.    There are no discontinued medications.  Encounter Diagnoses   Name Primary?    Pericardial effusion     Cardiovascular disease Yes    Pre-transplant evaluation for ESRD (end stage renal disease)     Status post liver transplant (H)     End stage renal disease (H)     PAF (paroxysmal atrial fibrillation) (H)     CHRISTIAN on CPAP      CURRENT MEDICATIONS:  Current Outpatient Medications   Medication Sig Dispense Refill    acetaminophen (TYLENOL) 325 MG tablet Take 2 tablets (650 mg) by mouth every 8 hours as needed for other (For optimal  "non-opioid multimodal pain management to improve pain control.)      ciprofloxacin (CIPRO) 250 MG tablet Take 1 tablet (250 mg) by mouth daily 90 tablet 0    ELIQUIS ANTICOAGULANT 5 MG tablet Take 1 tablet (5 mg) by mouth 2 times daily 180 tablet 1    ferrous sulfate (FEROSUL) 325 (65 Fe) MG tablet Take 1 tablet (325 mg) by mouth daily for 180 days 180 tablet 0    gabapentin (NEURONTIN) 100 MG capsule Take 3 capsules (300 mg) by mouth at bedtime 30 capsule 2    hydrOXYzine (ATARAX) 25 MG tablet Take 1 tablet (25 mg) by mouth 3 times daily as needed for itching 90 tablet 0    Lacosamide (VIMPAT) 100 MG TABS tablet Take 1 tablet (100 mg) by mouth every evening 31 tablet 5    lacosamide (VIMPAT) 50 MG TABS tablet Take 1 tablet (50 mg) by mouth every morning 93 tablet 3    melatonin 3 MG tablet Take 1 tablet (3 mg) by mouth At Bedtime (Patient taking differently: Take 3 mg by mouth nightly as needed) 90 tablet 1    midodrine (PROAMATINE) 10 MG tablet TAKE 1 TABLET 3 X DAILY. ON DIALYSIS DAYS(M, W, F) TAKE 2 EXTRA TABLETS 1 HOUR BEFORE, 1 TABLET WHEN YOU ARRIVE, AND 1 TABLET DURING DIALYSIS 450 tablet 1    multivitamin RENAL (TRIPHROCAPS) 1 capsule capsule Take 1 capsule by mouth daily 30 capsule 3    oxyCODONE (ROXICODONE) 5 MG tablet Take 1 tablet (5 mg) by mouth every 6 hours as needed for severe pain 12 tablet 0    sevelamer carbonate (RENVELA) 800 MG tablet Take 800 mg by mouth 4 times daily With meals and snacks      tacrolimus (GENERIC) 0.5 MG capsule Take 1 capsule (0.5 mg) by mouth 2 times daily 180 capsule 0     ALLERGIES   No Known Allergies  PAST MEDICAL, SURGICAL, FAMILY, SOCIAL HISTORY:  History was reviewed and updated as needed, see medical record.    Review of Systems:  A 10-point Review Of Systems is otherwise normal except for that which is noted in the HPI and interval summary.    Physical Exam:    Vitals: /70   Pulse 83   Ht 1.803 m (5' 11\")   Wt 90.3 kg (199 lb)   SpO2 97%   BMI 27.75 " kg/m    Constitutional: Appears stated age, well nourished, NAD.  Neck: Supple. Carotid pulses full and equal.   Respiratory: Non-labored. Lungs CTAB.  Cardiovascular: RRR, normal S1 and S2. No M/G/R.  No edema.  GI: Soft, non-distended, non-tender.  Skin: Warm and dry.   Musculoskeletal/Extremities: Symmetrical movement.  Neurologic: No gross focal deficits. Alert, awake.  Psychiatric: Affect appropriate. Mentation normal.    Recent Lab Results:  LIPID RESULTS:  Lab Results   Component Value Date    CHOL 82 02/15/2024    CHOL 161 04/20/2020    HDL 43 02/15/2024    HDL 42 04/20/2020    LDL 33 02/15/2024    LDL 81 04/20/2020    TRIG 31 02/15/2024    TRIG 192 (H) 04/20/2020     LIVER ENZYME RESULTS:  Lab Results   Component Value Date    AST 28 01/11/2024     (H) 04/20/2020    ALT <5 01/11/2024    ALT 72 (H) 04/20/2020     CBC RESULTS:  Lab Results   Component Value Date    WBC 4.3 01/16/2024    WBC 4.0 04/20/2020    RBC 2.83 (L) 01/16/2024    RBC 3.95 (L) 04/20/2020    HGB 8.7 (L) 01/16/2024    HGB 14.3 04/20/2020    HCT 28.6 (L) 01/16/2024    HCT 43.1 04/20/2020     (H) 01/16/2024     (H) 04/20/2020    MCH 30.7 01/16/2024    MCH 36.2 (H) 04/20/2020    MCHC 30.4 (L) 01/16/2024    MCHC 33.2 04/20/2020    RDW 17.4 (H) 01/16/2024    RDW 12.7 04/20/2020     (L) 01/16/2024     (L) 04/20/2020     BMP RESULTS:  Lab Results   Component Value Date     01/11/2024     04/20/2020    POTASSIUM 4.2 01/11/2024    POTASSIUM 3.6 10/05/2023    POTASSIUM 3.4 12/29/2022    POTASSIUM 3.9 04/20/2020    CHLORIDE 92 (L) 01/11/2024    CHLORIDE 103 12/29/2022    CHLORIDE 105 08/05/2020    CHLORIDE 106 04/20/2020    CO2 30 (H) 01/11/2024    CO2 31 12/29/2022    CO2 28 04/20/2020    ANIONGAP 13 01/11/2024    ANIONGAP 3 12/29/2022    ANIONGAP 6 04/20/2020     (H) 01/11/2024     (H) 01/06/2024     (H) 12/29/2022    GLC 93 04/20/2020    BUN 41.2 (H) 01/11/2024    BUN 46 (H)  12/29/2022    BUN 23 04/20/2020    CR 5.25 (H) 01/11/2024    CR 1.06 04/20/2020    GFRESTIMATED 12 (L) 01/11/2024    GFRESTIMATED 78 04/20/2020    GFRESTBLACK >90 04/20/2020    KELLY 9.5 01/11/2024    KELLY 9.2 04/20/2020      A1C RESULTS:  Lab Results   Component Value Date    A1C 5.0 01/11/2024    A1C 4.8 09/22/2016     INR RESULTS:  Lab Results   Component Value Date    INR 1.67 (H) 10/27/2023    INR 1.70 (H) 10/26/2023    INR 1.20 (H) 10/07/2019    INR 1.26 (H) 10/17/2017                     Thank you for allowing me to participate in the care of your patient.      Sincerely,     STEFFANY Vizcarra CNP     [facility]  cc:   STEFFANY Vizcarra CNP  [address]  [place] [address]

## 2024-02-16 LAB
EBV DNA # SPEC NAA+PROBE: <500 COPIES/ML
EBV DNA SPEC NAA+PROBE-LOG#: <2.7 {LOG_COPIES}/ML

## 2024-02-17 DIAGNOSIS — I10 HYPERTENSION, UNSPECIFIED TYPE: ICD-10-CM

## 2024-02-17 DIAGNOSIS — G47.00 INSOMNIA, UNSPECIFIED TYPE: Primary | ICD-10-CM

## 2024-02-17 RX ORDER — MIDODRINE HYDROCHLORIDE 10 MG/1
TABLET ORAL
Qty: 450 TABLET | Refills: 1 | Status: CANCELLED | OUTPATIENT
Start: 2024-02-17

## 2024-02-19 ENCOUNTER — TELEPHONE (OUTPATIENT)
Dept: CARDIOLOGY | Facility: CLINIC | Age: 59
End: 2024-02-19
Payer: COMMERCIAL

## 2024-02-19 RX ORDER — TRAZODONE HYDROCHLORIDE 50 MG/1
50 TABLET, FILM COATED ORAL AT BEDTIME
COMMUNITY
Start: 2023-11-22 | End: 2024-02-19

## 2024-02-19 RX ORDER — TRAZODONE HYDROCHLORIDE 50 MG/1
50 TABLET, FILM COATED ORAL AT BEDTIME
Qty: 90 TABLET | Refills: 1 | Status: SHIPPED | OUTPATIENT
Start: 2024-02-19

## 2024-02-19 NOTE — TELEPHONE ENCOUNTER
Reviewed pre-procedure instructions; patient verbalized understanding and did not have any additional questions. Cancelled rehab appts for 2/22. Janette Narvaez RN on 2/19/2024 at 1:12 PM    Pre-procedure instructions - Coronary Angiogram  Patient Education    Your arrival time is 9 AM.  Location is [address] Waiting Room  Please plan on being at the hospital all day.  At any time, emergencies and/or urgent cases may come up which could delay the start of your procedure.    Pre-procedure instructions - Coronary Angiogram  Shower in the evening before or the morning of the procedure  No solid food for 8 hours prior and nothing to drink 2 hours prior to arrival time  You can take your morning medications (except for diabetic and blood thinners) with sips of water.  Take 325 mg of Asprin 24 hours prior to the procedure and the morning of procedure.   You will need to arrange a ride to drop you off and pick you up, as you will be unable to drive home.  Prior to discharge you may be required to lay flat for approximately 2-4 hours in the recovery unit to ensure proper clotting of the artery. You will need a responsible adult to stay with you for 24 hours post-procedure.              Anticoagulation Medication Instructions   apixaban (ELIQUIS) - Hold 48 hours prior to procedure    You will need to follow up with one of our cardiology APPs 1-2 weeks after your procedure. If you need help scheduling or rescheduling your appointment, please call [phone]      ----- Message from Aracely Castano sent at 1/18/2024  8:56 AM CST -----  Cath Lab Case Request/Order    Location: [address] Waiting Room    Procedure: Coronary Angiogram    Procedure Date: 02-22-24    Patient Arrival Time: 9:00 AM    Procedure Time: 2nd case to follow    Ordering Provider:  Stephania Baer    Performing Cardiologist: Dr. Nima Dimas    Inpatient Bed Needed: No    Post-  Procedure HARRY appointment scheduled (1 - 2 weeks): NO     Date:      Provider:     Communicated Patient Instructions:     NPO, nothing to eat 8 hours and drink 2 hours before arrival time: No     , need to arrange a ride home - unable to drive post- procedure: No     Adult at home, need a responsible adult to stay with patient 24 hours post- procedure: NO    Appointment was scheduled: Over the phone    Patient expressed understanding of above instructions and denied further questions at this time.    Aracely Castano

## 2024-02-20 ENCOUNTER — HOSPITAL ENCOUNTER (OUTPATIENT)
Dept: CARDIAC REHAB | Facility: CLINIC | Age: 59
Discharge: HOME | End: 2024-02-20
Attending: STUDENT IN AN ORGANIZED HEALTH CARE EDUCATION/TRAINING PROGRAM
Payer: COMMERCIAL

## 2024-02-20 ENCOUNTER — TEAM CONFERENCE (OUTPATIENT)
Dept: TRANSPLANT | Facility: CLINIC | Age: 59
End: 2024-02-20

## 2024-02-20 ENCOUNTER — OFFICE VISIT (OUTPATIENT)
Dept: SLEEP MEDICINE | Facility: CLINIC | Age: 59
End: 2024-02-20
Attending: PHYSICIAN ASSISTANT
Payer: COMMERCIAL

## 2024-02-20 VITALS
HEIGHT: 71 IN | OXYGEN SATURATION: 99 % | BODY MASS INDEX: 27.52 KG/M2 | DIASTOLIC BLOOD PRESSURE: 61 MMHG | SYSTOLIC BLOOD PRESSURE: 100 MMHG | WEIGHT: 196.6 LBS | HEART RATE: 71 BPM

## 2024-02-20 DIAGNOSIS — R06.83 SNORING: ICD-10-CM

## 2024-02-20 PROCEDURE — 99205 OFFICE O/P NEW HI 60 MIN: CPT | Performed by: INTERNAL MEDICINE

## 2024-02-20 PROCEDURE — 93798 PHYS/QHP OP CAR RHAB W/ECG: CPT | Performed by: REHABILITATION PRACTITIONER

## 2024-02-20 ASSESSMENT — SLEEP AND FATIGUE QUESTIONNAIRES
HOW LIKELY ARE YOU TO NOD OFF OR FALL ASLEEP WHILE SITTING AND READING: SLIGHT CHANCE OF DOZING
HOW LIKELY ARE YOU TO NOD OFF OR FALL ASLEEP WHEN YOU ARE A PASSENGER IN A CAR FOR AN HOUR WITHOUT A BREAK: SLIGHT CHANCE OF DOZING
HOW LIKELY ARE YOU TO NOD OFF OR FALL ASLEEP WHILE SITTING AND TALKING TO SOMEONE: WOULD NEVER DOZE
HOW LIKELY ARE YOU TO NOD OFF OR FALL ASLEEP WHILE SITTING QUIETLY AFTER LUNCH WITHOUT ALCOHOL: WOULD NEVER DOZE
HOW LIKELY ARE YOU TO NOD OFF OR FALL ASLEEP IN A CAR, WHILE STOPPED FOR A FEW MINUTES IN TRAFFIC: WOULD NEVER DOZE
HOW LIKELY ARE YOU TO NOD OFF OR FALL ASLEEP WHILE LYING DOWN TO REST IN THE AFTERNOON WHEN CIRCUMSTANCES PERMIT: SLIGHT CHANCE OF DOZING
HOW LIKELY ARE YOU TO NOD OFF OR FALL ASLEEP WHILE SITTING INACTIVE IN A PUBLIC PLACE: WOULD NEVER DOZE
HOW LIKELY ARE YOU TO NOD OFF OR FALL ASLEEP WHILE WATCHING TV: SLIGHT CHANCE OF DOZING

## 2024-02-20 NOTE — NURSING NOTE
"Chief Complaint   Patient presents with    Sleep Problem     Sleep apnea, kidney transplant       Initial /61   Pulse 71   Ht 1.803 m (5' 10.98\")   Wt 89.2 kg (196 lb 9.6 oz)   SpO2 99%   BMI 27.43 kg/m   Estimated body mass index is 27.43 kg/m  as calculated from the following:    Height as of this encounter: 1.803 m (5' 10.98\").    Weight as of this encounter: 89.2 kg (196 lb 9.6 oz).    Medication Reconciliation: complete  ESS   Neck circumference: 40 centimeters.  Roman Hartley MA       "

## 2024-02-20 NOTE — PROGRESS NOTES
[facility]   Outpatient Sleep Medicine Consultation  February 20, 2024      Name: Blanco Osborne MRN# [id number]   Age: 58 year old YOB: 1965     Date of Consultation: February 20, 2024  Consultation is requested by: Ki Carter PA-C  [address] [address] Ki Carter  Primary care provider: Ki Carter         Reason for Sleep Consult:     Blanco Osborne is a 58 year old male with a history of severe CHRISTIAN who presents for a re-evaluation and management of same.         Assessment and Plan:     Summary Sleep Diagnoses:    Severe obstructive sleep apnea not on CPAP  He has lost tremendous amount of weight since his sleep study in 2012 and it is reasonable to perform a repeat sleep study to re-evaluate the severity of sleep disordered breathing. Given his claustrophobia and immunosuppressed state, an oral appliance is the most reasonable option if he still has significant remnant CHRISTIAN.      Summary Recommendations:    Orders Placed This Encounter   Procedures    HST-Home Sleep Apnea Test - Noxturnal Returnable       Summary Counseling:  See instructions    Counseling included a comprehensive review of diagnostic and therapeutic strategies as well as risks of inadequate therapy.  Educational materials provided in instructions.             History of Present Illness:   I had the pleasure of seeing Blanco Osborne, who is a 58 year old male with ESRD on HD, liver cirrhosis s/p transplant, severe CHRISTIAN not on CPAP, ongoing transplant evaluation for kidney transplant who presents for an evaluation of CHRISTIAN.  To briefly review, Blanco was diagnosed with severe CHRISTIAN following a split night PSG in 2012 after complaints of snoring, snorting, periods of not breathing, excessive daytime sleepiness. He was initially prescribed CPAP but did not use it as he had significant claustrophobia and was unable to tolerate it.  Since that time, he lost significant  amount of weight 343---> 196 Ibs following transplant for CARRILLO cirrhosis. He tells me that he doesn't snore anymore and he is not sleepy during the day. He occasionally doesn't have restful sleep and he has no symptoms suggestive of RLS nor central hypersomnia.  He is currently undergoing evaluation for a kidney transplant and he was referred for management of previously diagnosed CHRISTIAN.    PREVIOUS IN- LAB or HOME SLEEP STUDIES:  SPLIT NIGHT POLYSOMNOGRAPHY    Patient Name: Blanco Osborne  Date of Study: 2/27/2012  Referring Provider: Ki Powers MD  [address] [place] [address] [place] [address]    Brief History: 46 y.o. male with a history of snoring, snorting, periods of not breathing, excessive daytime sleepiness.    Sleep Study Results: This is a Split night Polysomnogram. On the baseline portion of the study, the patient slept 109 minutes with a sleep efficiency of 52%. Sleep architecture showed fragmentation from CHRISTIAN.    Respiratory: There is moderate snoring present. There is evidence of obstructive sleep apnea as the apnea-hypopnea index is 138 events per hour and the respiratory disturbance index is 142 events per hour.     CHRISTIAN is not limited to the supine position. Oxygen desaturations were as low as 60%, with a mean SaO2 of 87%..     During the second portion of the study, CPAP titration was performed. CPAP at 9 cwp eliminated the sleep disordered breathing observed on the baseline portion of the study. Titration was not successful during REM sleep in the supine position. This is not a complete titration.     EMG: There were no muscle tone abnormalities of REM sleep. The PLM index was 0 per hour and the PLM index with arousal was 0 per hour.    EKG: Normal sinus rhythm was noted on monitor. Sinus tachycardia observed early in study associated with apneas.    EEG and EOG: There were no abnormalities observed.    Impression:   1. This sleep study shows obstructive sleep apnea, severe. CPAP titration  in the second part of the night shows resolution of sleep disordered breathing, elimination of oxygen desaturations and improvement in sleep architecture.    Recommendations:  1. A prescription for a trial of CPAP at 11 cm H2O will be provided to compensate for supine REM sleep not observed on this study.   2. Close follow-up in sleep clinic for clinical correlation and further management.    All epochs of this study were reviewed for this report.      SLEEP-WAKE SCHEDULE:     Blanco Osborne     -Describes themself as neither a morning or night person;  -Naps - NONE    -ON WEEKDAYS, goes to sleep at 10:00 PM during the week; awakens  5:30 AM with an alarm; falls asleep in 10 minutes; denies difficulty falling asleep.     -ON WEEKENDS, SAME as weekdays     -Awakens 1-2 times a night for 10 minutes before falling back to sleep; awakens to pain      SCALES       SLEEP APNEA: Stopbang score-  1        INSOMNIA:  Insomnia severity score: 5       SLEEPINESS: McLeod sleepiness scale:  [normal < 11]   Drowsy driving/near accidents: None   Consequences: None       PHQ9:          No data to display                 SLEEP COMPLAINTS:  Cardio-respiratory    Snoring- /week  Dyspnea- denies  Morning headaches or confusion-denies  Coexisting Lung disease: denies    Coexisting Heart disease: denies    Does patient have a bed partner: denies  Has bed partner been sleeping separately because of snoring:  denies            RLS Screen: When you try to relax in the evening or sleep at  night, do you ever have unpleasant, restless feelings in your  legs that can be relieved by walking or movement? denies    Periodic limb movement: denies    Narcolepsy:  denies sudden urges of sleep attacks  Cataplexy: denies   Sleep paralysis:  denies    Hallucinations:   denies       Sleep Behaviors:  Leg symptoms/movements:denies  Motor restlessness:denies  Night terrors: denies  Bruxism: denies  Automatic behaviors: none    Other subjective  complaints:  Anxiety or rumination denies  Pain and discomfort at  night: denies  Waking up with heart pounding or racing: denies  GERD or aspiration:denies         Parasomnia:   NREM - denies recurrent persistent confusional arousal, night eating, sleep walking or sleep terrors   REM  - denies dream enactment; injuries   Safety: No issues             Medications:     Current Outpatient Medications   Medication Sig    acetaminophen (TYLENOL) 325 MG tablet Take 2 tablets (650 mg) by mouth every 8 hours as needed for other (For optimal non-opioid multimodal pain management to improve pain control.)    ciprofloxacin (CIPRO) 250 MG tablet Take 1 tablet (250 mg) by mouth daily    ELIQUIS ANTICOAGULANT 5 MG tablet Take 1 tablet (5 mg) by mouth 2 times daily    ferrous sulfate (FEROSUL) 325 (65 Fe) MG tablet Take 1 tablet (325 mg) by mouth daily for 180 days    gabapentin (NEURONTIN) 100 MG capsule Take 3 capsules (300 mg) by mouth at bedtime    hydrOXYzine (ATARAX) 25 MG tablet Take 1 tablet (25 mg) by mouth 3 times daily as needed for itching    Lacosamide (VIMPAT) 100 MG TABS tablet Take 1 tablet (100 mg) by mouth every evening    lacosamide (VIMPAT) 50 MG TABS tablet Take 1 tablet (50 mg) by mouth every morning    melatonin 3 MG tablet Take 1 tablet (3 mg) by mouth At Bedtime (Patient taking differently: Take 3 mg by mouth nightly as needed)    midodrine (PROAMATINE) 10 MG tablet TAKE 1 TABLET 3 X DAILY. ON DIALYSIS DAYS(M, W, F) TAKE 2 EXTRA TABLETS 1 HOUR BEFORE, 1 TABLET WHEN YOU ARRIVE, AND 1 TABLET DURING DIALYSIS    multivitamin RENAL (TRIPHROCAPS) 1 capsule capsule Take 1 capsule by mouth daily    oxyCODONE (ROXICODONE) 5 MG tablet Take 1 tablet (5 mg) by mouth every 6 hours as needed for severe pain    sevelamer carbonate (RENVELA) 800 MG tablet Take 800 mg by mouth 4 times daily With meals and snacks    tacrolimus (GENERIC) 0.5 MG capsule Take 1 capsule (0.5 mg) by mouth 2 times daily    traZODone (DESYREL) 50  MG tablet Take 1 tablet (50 mg) by mouth at bedtime     No current facility-administered medications for this visit.        No Known Allergies         Past Medical History:     Does not need 02 supplement at night   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    Cancer (H) 2023    Squamous Cell Cancer- Since resolved    Cirrhosis of liver with ascites (H) 02/11/2016    Coagulopathy (H24)     Critical illness myopathy     Dialysis patient (H24)     DIALYSIS 3X/ WEEK    Embolic stroke (H) 11/20/2022    Encephalopathy     ESRD (end stage renal disease) on dialysis (H)     Gastroesophageal reflux disease     H/O alcohol abuse     History of blood transfusion     Hyperammonemia (H24)     Hyperlipidemia     Hypertension     Patients states that HTN has been resolved    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    Insomnia     Lactic acidosis 11/12/2022    Liver replaced by transplant (H) 09/20/2016    NAFLD (nonalcoholic fatty liver disease) 02/11/2016    CHRISTIAN on CPAP     Parainfluenza type 1 infection 11/19/2022    Parapneumonic effusion     Pleural effusion     Pneumothorax on left 12/16/2022    Respiratory acidosis 11/12/2022    Respiratory arrest (H) 11/12/2022    Restless legs syndrome (RLS)     SBP (spontaneous bacterial peritonitis) (H)     MNGI    Seizures (H) 12/2022    Sepsis due to Streptococcus pneumoniae with acute hypoxic respiratory failure (H) 11/19/2022    Stroke, embolic (H) 11/20/2022    Sudden cardiac arrest (H) 12/29/2022    PEA- 2 min of CPR    Tubular adenoma 10/2019    Large cecal adenoma- due for surveillance colonoscopy in 3 years (10/2022)             Past Surgical History:    Previous upper airway surgery    Past Surgical History:    Procedure Laterality Date    APPENDECTOMY      BENCH LIVER N/A 09/20/2016    Procedure: BENCH LIVER;  Surgeon: Enoc Crews MD;  Location: [facility] OR    BRONCHOSCOPY FLEXIBLE AND RIGID N/A 12/20/2022    Procedure: BRONCHOSCOPY;  Surgeon: Alena Valenzuela MD;  Location:  GI    COLONOSCOPY  08/06/2013    repeat in 2018    COLONOSCOPY N/A 10/04/2019    Procedure: COLONOSCOPY, WITH POLYPECTOMY AND BIOPSY;  Surgeon: Go Chong MD;  Location: [facility] OR    CREATE FISTULA ARTERIOVENOUS UPPER EXTREMITY Left 03/21/2023    Procedure: LEFT UPPER EXTREMITY ARTERIOVENOUS FISTULA CREATION. LIGATION OF COMPETING VASCULAR BRANCHES- LEFT;  Surgeon: Deejay Mcneil MD;  Location:  OR    CV PERICARDIOCENTESIS N/A 9/29/2023    Procedure: Pericardiocentesis;  Surgeon: Barry Mckeon MD;  Location:  HEART CARDIAC CATH LAB    CV PERICARDIOCENTESIS N/A 10/11/2023    Procedure: Pericardiocentesis;  Surgeon: Ulises Bhakta MD;  Location:  HEART CARDIAC CATH LAB    CV RIGHT HEART CATH MEASUREMENTS RECORDED N/A 10/11/2023    Procedure: Right Heart Catheterization;  Surgeon: Ulises Bhakta MD;  Location:  HEART CARDIAC CATH LAB    HERNIA REPAIR      IR CHEST TUBE PLACEMENT NON-TUNNELED RIGHT  12/12/2022    IR CVC TUNNEL PLACEMENT > 5 YRS OF AGE  12/06/2022    IR CVC TUNNEL PLACEMENT > 5 YRS OF AGE  1/3/2024    IR DIALYSIS FISTULOGRAM LEFT  07/13/2023    IR GASTROSTOMY TUBE CHANGE  02/08/2023    IR GASTROSTOMY TUBE PERCUTANEOUS PLCMNT  11/29/2022    IR PARACENTESIS  11/29/2022    IR PARACENTESIS  12/01/2022    IR PARACENTESIS  2/10/2016    IR PARACENTESIS  2/28/2016    IR THORACENTESIS  12/06/2022    IR THORACENTESIS  09/01/2020    IR THORACENTESIS  08/10/2020    IR TRANSCATHETER BIOPSY  12/01/2022    REVISION FISTULA ARTERIOVENOUS UPPER EXTREMITY Left 8/15/2023    Procedure: 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 2; OUTFLOW REVISION CEPHALIC TO BRACHIAL VEIN;  Surgeon: Tarun  "Deejay Mandel MD;  Location: [facility] OR    REVISION FISTULA ARTERIOVENOUS UPPER EXTREMITY Left 1/3/2024    Procedure: Excision and repair of LEFT brachiocephalic arteriovenous fistula and vein patch angioplasty;  Surgeon: Deejay Mcneil MD;  Location: [facility] OR    TRACHEOSTOMY N/A 2022    Procedure: TRACHEOSTOMY;  Surgeon: Nilesh Jackson MD;  Location: [facility] OR    TRANSPLANT LIVER RECIPIENT  DONOR N/A 2016    Procedure: TRANSPLANT LIVER RECIPIENT  DONOR;  Surgeon: Enoc Crews MD;  Location: [facility] OR            Social History:     Social History     Tobacco Use    Smoking status: Never    Smokeless tobacco: Never   Substance Use Topics    Alcohol use: Not Currently     Alcohol/week: 0.0 standard drinks of alcohol         Chemical History:     Tobacco:  Never     Uses 0.5 cups/day of coffee. Last caffeine intake is usually before noon    Supplements for wakefulness: None    EtOH: None None of the patient's responses to the CAGE screening were positive / Negative CAGE score  Recreational Drugs: None     Psych Hx:        No data to display              Current dangers to self or others:none           Family History:     Family History   Problem Relation Age of Onset    Coronary Artery Disease No family hx of     Cardiomyopathy No family hx of         Sleep Family Hx:        RLS- None   CHRISTIAN - None  Insomnia - None  Parasomnia - None         Review of Systems:     A complete 10 point review of systems was negative other than HPI or as commented below:   Blanco Osborne has gained 5-10 pounds in 10 years.               Physical Examination:     /61   Pulse 71   Ht 1.803 m (5' 10.98\")   Wt 89.2 kg (196 lb 9.6 oz)   SpO2 99%   BMI 27.43 kg/m    Exam:  Constitutional: healthy, alert, and no distress  Head: Normocephalic. No masses, lesions, tenderness or abnormalities  ENT: ENT exam normal, no neck nodes or sinus tenderness  Cardiovascular: negative, PMI normal. No " "lifts, heaves, or thrills. RRR. No murmurs, clicks gallops or rub  Respiratory: negative, Percussion normal. Good diaphragmatic excursion. Lungs clear  Gastrointestinal: Abdomen soft, non-tender. BS normal. No masses, organomegaly  : Deferred  Musculoskeletal: extremities normal- no gross deformities noted, gait normal, and normal muscle tone  Skin: no suspicious lesions or rashes  Neurologic: Gait normal. Reflexes normal and symmetric. Sensation grossly WNL.  Psychiatric: mentation appears normal and affect normal/bright              Data: All pertinent previous laboratory data reviewed     Lab Results   Component Value Date    PH 7.30 (L) 03/05/2023    PH 7.43 12/20/2022    PO2 94 03/05/2023    PO2 118 (H) 12/20/2022    PCO2 57 (H) 03/05/2023    PCO2 40 12/20/2022    HCO3 28 03/05/2023    HCO3 27 12/20/2022    RONDA 0.7 03/05/2023    RONDA 2.1 (H) 12/20/2022     Lab Results   Component Value Date    TSH 4.89 (H) 03/25/2023    TSH 1.76 03/01/2016     Lab Results   Component Value Date     (H) 01/11/2024     (H) 01/06/2024     Lab Results   Component Value Date    HGB 8.7 (L) 01/16/2024    HGB 8.7 (L) 01/11/2024     Lab Results   Component Value Date    BUN 41.2 (H) 01/11/2024    BUN 55.5 (H) 01/06/2024    CR 5.25 (H) 01/11/2024    CR 6.30 (H) 01/06/2024     Lab Results   Component Value Date    AST 28 01/11/2024    AST 23 01/06/2024    ALT <5 01/11/2024    ALT <5 01/06/2024    ALKPHOS 282 (H) 01/11/2024    ALKPHOS 209 (H) 01/06/2024    BILITOTAL 1.0 01/11/2024    BILITOTAL 1.2 01/06/2024    CHANDLER 37 10/30/2023    CHANDLER 14 (L) 10/05/2023     No results found for: \"UAMP\", \"UBARB\", \"BENZODIAZEUR\", \"UCANN\", \"UCOC\", \"OPIT\", \"UPCP\"        Copy to: Ki Carter MD 2/20/2024   RTC after sleep study    Total time spent with patient: 60 min >50% counseling         "

## 2024-02-21 ENCOUNTER — HOSPITAL ENCOUNTER (OUTPATIENT)
Dept: ULTRASOUND IMAGING | Facility: CLINIC | Age: 59
Discharge: HOME | End: 2024-02-21
Payer: COMMERCIAL

## 2024-02-21 ENCOUNTER — OFFICE VISIT (OUTPATIENT)
Dept: OTHER | Facility: CLINIC | Age: 59
End: 2024-02-21
Payer: COMMERCIAL

## 2024-02-21 VITALS — DIASTOLIC BLOOD PRESSURE: 77 MMHG | HEART RATE: 65 BPM | SYSTOLIC BLOOD PRESSURE: 118 MMHG

## 2024-02-21 DIAGNOSIS — N18.6 ESRD (END STAGE RENAL DISEASE) ON DIALYSIS (H): ICD-10-CM

## 2024-02-21 DIAGNOSIS — Z99.2 ESRD (END STAGE RENAL DISEASE) ON DIALYSIS (H): ICD-10-CM

## 2024-02-21 DIAGNOSIS — T82.838D: ICD-10-CM

## 2024-02-21 DIAGNOSIS — T82.838D: Primary | ICD-10-CM

## 2024-02-21 PROCEDURE — 99213 OFFICE O/P EST LOW 20 MIN: CPT

## 2024-02-21 PROCEDURE — 93990 DOPPLER FLOW TESTING: CPT

## 2024-02-21 PROCEDURE — G0463 HOSPITAL OUTPT CLINIC VISIT: HCPCS

## 2024-02-21 NOTE — TELEPHONE ENCOUNTER
Image Review Meeting    ATTENDEES: Dr. Benitez    IMAGES REVIEWED: Abdominal US 2/15/24    DECISION: Ok to proceed, small volume simple ascites, doppler is patent.

## 2024-02-21 NOTE — PROGRESS NOTES
[facility]        Patient is here for a  follow up.    Pt is currently taking Eliquis and Antibiotics.    /77 (BP Location: Right arm, Patient Position: Chair, Cuff Size: Adult Regular)   Pulse 65     The provider has been notified that the patient has no concerns.     Questions patient would like addressed today are: N/A.    Refills are needed: N/A    Has homecare services and agency name:  Parul Valdez MA

## 2024-02-22 ENCOUNTER — APPOINTMENT (OUTPATIENT)
Dept: LAB | Facility: CLINIC | Age: 59
End: 2024-02-22
Attending: INTERNAL MEDICINE
Payer: COMMERCIAL

## 2024-02-22 ENCOUNTER — HOSPITAL ENCOUNTER (OUTPATIENT)
Facility: CLINIC | Age: 59
Discharge: HOME | End: 2024-02-22
Attending: INTERNAL MEDICINE | Admitting: INTERNAL MEDICINE
Payer: COMMERCIAL

## 2024-02-22 ENCOUNTER — APPOINTMENT (OUTPATIENT)
Dept: MEDSURG UNIT | Facility: CLINIC | Age: 59
End: 2024-02-22
Attending: INTERNAL MEDICINE
Payer: COMMERCIAL

## 2024-02-22 VITALS
TEMPERATURE: 98.3 F | HEIGHT: 71 IN | BODY MASS INDEX: 27.01 KG/M2 | DIASTOLIC BLOOD PRESSURE: 82 MMHG | HEART RATE: 68 BPM | WEIGHT: 192.9 LBS | SYSTOLIC BLOOD PRESSURE: 120 MMHG | OXYGEN SATURATION: 98 % | RESPIRATION RATE: 14 BRPM

## 2024-02-22 DIAGNOSIS — I35.1 NONRHEUMATIC AORTIC (VALVE) INSUFFICIENCY: ICD-10-CM

## 2024-02-22 DIAGNOSIS — Z01.818 PRE-TRANSPLANT EVALUATION FOR ESRD (END STAGE RENAL DISEASE): Primary | ICD-10-CM

## 2024-02-22 DIAGNOSIS — Z86.74 HISTORY OF SUDDEN CARDIAC ARREST: ICD-10-CM

## 2024-02-22 PROBLEM — Z98.890 STATUS POST CORONARY ANGIOGRAM: Status: ACTIVE | Noted: 2024-02-22

## 2024-02-22 LAB
ANION GAP SERPL CALCULATED.3IONS-SCNC: 16 MMOL/L (ref 7–15)
APTT PPP: 37 SECONDS (ref 22–38)
BUN SERPL-MCNC: 49.2 MG/DL (ref 8–23)
CALCIUM SERPL-MCNC: 9.2 MG/DL (ref 8.6–10)
CHLORIDE SERPL-SCNC: 95 MMOL/L (ref 98–107)
CREAT SERPL-MCNC: 6.16 MG/DL (ref 0.67–1.17)
DEPRECATED HCO3 PLAS-SCNC: 28 MMOL/L (ref 22–29)
EGFRCR SERPLBLD CKD-EPI 2021: 10 ML/MIN/1.73M2
ERYTHROCYTE [DISTWIDTH] IN BLOOD BY AUTOMATED COUNT: 14.6 % (ref 10–15)
GLUCOSE SERPL-MCNC: 97 MG/DL (ref 70–99)
HCT VFR BLD AUTO: 27.7 % (ref 40–53)
HGB BLD-MCNC: 8.9 G/DL (ref 13.3–17.7)
INR PPP: 1.41 (ref 0.85–1.15)
MCH RBC QN AUTO: 32.2 PG (ref 26.5–33)
MCHC RBC AUTO-ENTMCNC: 32.1 G/DL (ref 31.5–36.5)
MCV RBC AUTO: 100 FL (ref 78–100)
PLATELET # BLD AUTO: 96 10E3/UL (ref 150–450)
POTASSIUM SERPL-SCNC: 4 MMOL/L (ref 3.4–5.3)
RBC # BLD AUTO: 2.76 10E6/UL (ref 4.4–5.9)
SODIUM SERPL-SCNC: 139 MMOL/L (ref 135–145)
WBC # BLD AUTO: 2.9 10E3/UL (ref 4–11)

## 2024-02-22 PROCEDURE — 99152 MOD SED SAME PHYS/QHP 5/>YRS: CPT | Mod: GC | Performed by: INTERNAL MEDICINE

## 2024-02-22 PROCEDURE — 93454 CORONARY ARTERY ANGIO S&I: CPT | Performed by: INTERNAL MEDICINE

## 2024-02-22 PROCEDURE — 85027 COMPLETE CBC AUTOMATED: CPT

## 2024-02-22 PROCEDURE — 250N000011 HC RX IP 250 OP 636: Performed by: INTERNAL MEDICINE

## 2024-02-22 PROCEDURE — 93454 CORONARY ARTERY ANGIO S&I: CPT | Mod: 26 | Performed by: INTERNAL MEDICINE

## 2024-02-22 PROCEDURE — 999N000054 HC STATISTIC EKG NON-CHARGEABLE

## 2024-02-22 PROCEDURE — 80048 BASIC METABOLIC PNL TOTAL CA: CPT

## 2024-02-22 PROCEDURE — 999N000134 HC STATISTIC PP CARE STAGE 3

## 2024-02-22 PROCEDURE — 93005 ELECTROCARDIOGRAM TRACING: CPT

## 2024-02-22 PROCEDURE — 999N000142 HC STATISTIC PROCEDURE PREP ONLY

## 2024-02-22 PROCEDURE — 85730 THROMBOPLASTIN TIME PARTIAL: CPT

## 2024-02-22 PROCEDURE — 272N000001 HC OR GENERAL SUPPLY STERILE: Performed by: INTERNAL MEDICINE

## 2024-02-22 PROCEDURE — 85610 PROTHROMBIN TIME: CPT

## 2024-02-22 PROCEDURE — C1894 INTRO/SHEATH, NON-LASER: HCPCS | Performed by: INTERNAL MEDICINE

## 2024-02-22 PROCEDURE — 99152 MOD SED SAME PHYS/QHP 5/>YRS: CPT | Performed by: INTERNAL MEDICINE

## 2024-02-22 PROCEDURE — 250N000009 HC RX 250: Performed by: INTERNAL MEDICINE

## 2024-02-22 PROCEDURE — 36415 COLL VENOUS BLD VENIPUNCTURE: CPT

## 2024-02-22 PROCEDURE — C1726 CATH, BAL DIL, NON-VASCULAR: HCPCS | Performed by: INTERNAL MEDICINE

## 2024-02-22 PROCEDURE — 258N000003 HC RX IP 258 OP 636

## 2024-02-22 RX ORDER — OXYCODONE HYDROCHLORIDE 5 MG/1
5 TABLET ORAL EVERY 4 HOURS PRN
Status: DISCONTINUED | OUTPATIENT
Start: 2024-02-22 | End: 2024-02-22 | Stop reason: HOSPADM

## 2024-02-22 RX ORDER — NALOXONE HYDROCHLORIDE 0.4 MG/ML
0.4 INJECTION, SOLUTION INTRAMUSCULAR; INTRAVENOUS; SUBCUTANEOUS
Status: DISCONTINUED | OUTPATIENT
Start: 2024-02-22 | End: 2024-02-22 | Stop reason: HOSPADM

## 2024-02-22 RX ORDER — DEXTROSE MONOHYDRATE 25 G/50ML
25-50 INJECTION, SOLUTION INTRAVENOUS
Status: DISCONTINUED | OUTPATIENT
Start: 2024-02-22 | End: 2024-02-22 | Stop reason: HOSPADM

## 2024-02-22 RX ORDER — POTASSIUM CHLORIDE 750 MG/1
40 TABLET, EXTENDED RELEASE ORAL
Status: DISCONTINUED | OUTPATIENT
Start: 2024-02-22 | End: 2024-02-22 | Stop reason: HOSPADM

## 2024-02-22 RX ORDER — ASPIRIN 81 MG/1
243 TABLET, CHEWABLE ORAL ONCE
Status: COMPLETED | OUTPATIENT
Start: 2024-02-22 | End: 2024-02-22

## 2024-02-22 RX ORDER — LIDOCAINE 40 MG/G
CREAM TOPICAL
Status: DISCONTINUED | OUTPATIENT
Start: 2024-02-22 | End: 2024-02-22 | Stop reason: HOSPADM

## 2024-02-22 RX ORDER — NALOXONE HYDROCHLORIDE 0.4 MG/ML
0.2 INJECTION, SOLUTION INTRAMUSCULAR; INTRAVENOUS; SUBCUTANEOUS
Status: DISCONTINUED | OUTPATIENT
Start: 2024-02-22 | End: 2024-02-22 | Stop reason: HOSPADM

## 2024-02-22 RX ORDER — ATROPINE SULFATE 0.1 MG/ML
0.5 INJECTION INTRAVENOUS
Status: DISCONTINUED | OUTPATIENT
Start: 2024-02-22 | End: 2024-02-22 | Stop reason: HOSPADM

## 2024-02-22 RX ORDER — NICOTINE POLACRILEX 4 MG
15-30 LOZENGE BUCCAL
Status: DISCONTINUED | OUTPATIENT
Start: 2024-02-22 | End: 2024-02-22 | Stop reason: HOSPADM

## 2024-02-22 RX ORDER — POTASSIUM CHLORIDE 750 MG/1
20 TABLET, EXTENDED RELEASE ORAL
Status: DISCONTINUED | OUTPATIENT
Start: 2024-02-22 | End: 2024-02-22 | Stop reason: HOSPADM

## 2024-02-22 RX ORDER — ASPIRIN 325 MG
325 TABLET ORAL ONCE
Status: COMPLETED | OUTPATIENT
Start: 2024-02-22 | End: 2024-02-22

## 2024-02-22 RX ORDER — FENTANYL CITRATE 50 UG/ML
25 INJECTION, SOLUTION INTRAMUSCULAR; INTRAVENOUS
Status: DISCONTINUED | OUTPATIENT
Start: 2024-02-22 | End: 2024-02-22 | Stop reason: HOSPADM

## 2024-02-22 RX ORDER — FLUMAZENIL 0.1 MG/ML
0.2 INJECTION, SOLUTION INTRAVENOUS
Status: DISCONTINUED | OUTPATIENT
Start: 2024-02-22 | End: 2024-02-22 | Stop reason: HOSPADM

## 2024-02-22 RX ORDER — IOPAMIDOL 755 MG/ML
INJECTION, SOLUTION INTRAVASCULAR
Status: DISCONTINUED | OUTPATIENT
Start: 2024-02-22 | End: 2024-02-22 | Stop reason: HOSPADM

## 2024-02-22 RX ORDER — SODIUM CHLORIDE 9 MG/ML
INJECTION, SOLUTION INTRAVENOUS CONTINUOUS
Status: DISCONTINUED | OUTPATIENT
Start: 2024-02-22 | End: 2024-02-22 | Stop reason: HOSPADM

## 2024-02-22 RX ORDER — FENTANYL CITRATE 50 UG/ML
INJECTION, SOLUTION INTRAMUSCULAR; INTRAVENOUS
Status: DISCONTINUED | OUTPATIENT
Start: 2024-02-22 | End: 2024-02-22 | Stop reason: HOSPADM

## 2024-02-22 RX ORDER — FENTANYL CITRATE-0.9 % NACL/PF 10 MCG/ML
PLASTIC BAG, INJECTION (ML) INTRAVENOUS
Status: DISCONTINUED | OUTPATIENT
Start: 2024-02-22 | End: 2024-02-22 | Stop reason: HOSPADM

## 2024-02-22 RX ORDER — OXYCODONE HYDROCHLORIDE 10 MG/1
10 TABLET ORAL EVERY 4 HOURS PRN
Status: DISCONTINUED | OUTPATIENT
Start: 2024-02-22 | End: 2024-02-22 | Stop reason: HOSPADM

## 2024-02-22 RX ADMIN — SODIUM CHLORIDE: 9 INJECTION, SOLUTION INTRAVENOUS at 09:59

## 2024-02-22 ASSESSMENT — ACTIVITIES OF DAILY LIVING (ADL)
ADLS_ACUITY_SCORE: 37

## 2024-02-22 NOTE — DISCHARGE INSTRUCTIONS
Going Home after an Coronary Angiogram       After you go home:  Have an adult stay with you for 24 hours.  Drink plenty of fluids.  You may eat your normal diet, unless your doctor tells you otherwise.  For 24 hours:  Relax and take it easy.  Do NOT smoke.  Do NOT make any important or legal decisions.  Do NOT drive or operate machines at home or at work.  Do NOT drink alcohol.  Remove the Band-Aid after 24 hours. If there is minor oozing, apply another Band-aid and remove it after 12 hours.  For 2 days, do NOT have sex or do any heavy exercise.  Do NOT take a bath, or use a hot tub or pool for at least 3 days. You may shower.    Care of groin site  It is normal to have a small bruise or lump at the site.  Do not scrub the site.  For the first 2 days: Do not stoop or squat. When you cough, sneeze or move your bowels, hold your hand over the puncture site and press gently.  Do not lift more than 10 pounds for at least 3 to 5 days.  Do not use lotion or powder near the puncture site for 3 days.    If you start bleeding from the site in your groin, lie down flat and press firmly  on the site. Call your doctor as soon as you can.    Medicines  If you have started taking Plavix or Effient, do not stop taking it until you talk to your heart doctor (cardiologist).  If you are on metformin (Glucophage), do not restart it until you have blood tests (within 2 to 3 days after discharge). When your doctor tells you it is safe, you may restart the metformin.  If you have stopped any other medicines, check with your nurse or provider about when to restart them.    Call 911 right away if you have bleeding that is heavy or does not stop.    Call your doctor if:  You have a large or growing hard lump around the site.  The site is red, swollen, hot or tender.  Blood or fluid is draining from the site.  You have chills or a fever greater than 101 F (38 C).  Your leg or arm feels numb or cool.  You have hives, a rash or unusual  itching.      [facility] [place]:  [phone] (7 days a week)

## 2024-02-22 NOTE — Clinical Note
Contrast risk dose (3.7 * GFR)    37 mL for 100%, 28 mL for 75% -- GFR 10; pt does hemodialysis on MWF via subclavian dialysis catheter.

## 2024-02-22 NOTE — PROGRESS NOTES
VASCULAR SURGERY PROGRESS NOTE    LOCATION: Vascular [facility]    Blanco Osborne  Medical Record #: [id number]  YOB: 1965  Age: 58 year old     Date of Service: 2/21/2024    PRIMARY CARE PROVIDER: Ki Carter    Reason for visit:  Follow-up     Blanco Osborne is a 58 year old male who underwent repair of pseudoaneurysm and evacuation of left arm hematoma of the left upper arm AVF with vein patch to stenotic area in mid graft on 1/3/2024 with Dr. Mcneil. He comes into clinic today for ultrasound and follow-up    On examination Blanco is alert and oriented x4, neurologically intact  Denies pain. Edema improved  Palpable thrill with 2+ radial pulse  Continues to use tunneled catheter for dialysis            RECOMMENDATION/RISKS: Discussed with Blanco that ideally outflow volume should be above 800 ml/min. Given how borderline he is, discussed with Dr. Mcneil and called Blanco in terms of plan of care that we will have Blanco proceed with dialysis via his AVF. If he were to have issues to call [facility]. Regardless in 3 months time we would like a ultrasound of his graft. He is currently undergo extensive work-up so he can be placed on kidney transplant list.     REVIEW OF SYSTEMS:    A 12 point ROS was reviewed and is negative except for what is listed above in HPI.    PHH:    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    Cancer (H) 2023    Squamous Cell Cancer- Since resolved    Cirrhosis of liver with ascites (H) 02/11/2016    Coagulopathy (H24)     Critical illness myopathy     Dialysis patient (H24)     DIALYSIS 3X/ WEEK    Embolic stroke (H) 11/20/2022    Encephalopathy     ESRD (end stage renal disease) on dialysis (H)     Gastroesophageal reflux  disease     H/O alcohol abuse     History of blood transfusion     Hyperammonemia (H24)     Hyperlipidemia     Hypertension     Patients states that HTN has been resolved    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    Insomnia     Lactic acidosis 11/12/2022    Liver replaced by transplant (H) 09/20/2016    NAFLD (nonalcoholic fatty liver disease) 02/11/2016    CHRISTIAN on CPAP     Parainfluenza type 1 infection 11/19/2022    Parapneumonic effusion     Pleural effusion     Pneumothorax on left 12/16/2022    Respiratory acidosis 11/12/2022    Respiratory arrest (H) 11/12/2022    Restless legs syndrome (RLS)     SBP (spontaneous bacterial peritonitis) (H)     MNGI    Seizures (H) 12/2022    Sepsis due to Streptococcus pneumoniae with acute hypoxic respiratory failure (H) 11/19/2022    Stroke, embolic (H) 11/20/2022    Sudden cardiac arrest (H) 12/29/2022    PEA- 2 min of CPR    Tubular adenoma 10/2019    Large cecal adenoma- due for surveillance colonoscopy in 3 years (10/2022)          Past Surgical History:   Procedure Laterality Date    APPENDECTOMY      BENCH LIVER N/A 09/20/2016    Procedure: BENCH LIVER;  Surgeon: Enoc Crews MD;  Location: [facility] OR    BRONCHOSCOPY FLEXIBLE AND RIGID N/A 12/20/2022    Procedure: BRONCHOSCOPY;  Surgeon: Alena Valenzuela MD;  Location:  GI    COLONOSCOPY  08/06/2013    repeat in 2018    COLONOSCOPY N/A 10/04/2019    Procedure: COLONOSCOPY, WITH POLYPECTOMY AND BIOPSY;  Surgeon: Go Chong MD;  Location:  OR    CREATE FISTULA ARTERIOVENOUS UPPER EXTREMITY Left 03/21/2023    Procedure: LEFT UPPER EXTREMITY ARTERIOVENOUS FISTULA CREATION. LIGATION OF COMPETING VASCULAR BRANCHES- LEFT;  Surgeon: Deejay Mcneil MD;  Location:  OR    CV PERICARDIOCENTESIS N/A 9/29/2023    Procedure: Pericardiocentesis;  Surgeon: Barry Mckeon MD;  Location:  HEART CARDIAC CATH LAB    CV PERICARDIOCENTESIS N/A 10/11/2023    Procedure:  Pericardiocentesis;  Surgeon: Ulises Bhakta MD;  Location:  HEART CARDIAC CATH LAB    CV RIGHT HEART CATH MEASUREMENTS RECORDED N/A 10/11/2023    Procedure: Right Heart Catheterization;  Surgeon: Ulises Bhakta MD;  Location:  HEART CARDIAC CATH LAB    HERNIA REPAIR      IR CHEST TUBE PLACEMENT NON-TUNNELED RIGHT  2022    IR CVC TUNNEL PLACEMENT > 5 YRS OF AGE  2022    IR CVC TUNNEL PLACEMENT > 5 YRS OF AGE  1/3/2024    IR DIALYSIS FISTULOGRAM LEFT  2023    IR GASTROSTOMY TUBE CHANGE  2023    IR GASTROSTOMY TUBE PERCUTANEOUS PLCMNT  2022    IR PARACENTESIS  2022    IR PARACENTESIS  2022    IR PARACENTESIS  2/10/2016    IR PARACENTESIS  2016    IR THORACENTESIS  2022    IR THORACENTESIS  2020    IR THORACENTESIS  08/10/2020    IR TRANSCATHETER BIOPSY  2022    REVISION FISTULA ARTERIOVENOUS UPPER EXTREMITY Left 8/15/2023    Procedure: 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 2; OUTFLOW REVISION CEPHALIC TO BRACHIAL VEIN;  Surgeon: Deejay Mcneil MD;  Location:  OR    REVISION FISTULA ARTERIOVENOUS UPPER EXTREMITY Left 1/3/2024    Procedure: Excision and repair of LEFT brachiocephalic arteriovenous fistula and vein patch angioplasty;  Surgeon: Deejay Mcneil MD;  Location:  OR    TRACHEOSTOMY N/A 2022    Procedure: TRACHEOSTOMY;  Surgeon: Nilesh Jackson MD;  Location:  OR    TRANSPLANT LIVER RECIPIENT  DONOR N/A 2016    Procedure: TRANSPLANT LIVER RECIPIENT  DONOR;  Surgeon: Enoc Crews MD;  Location:  OR       ALLERGIES:  Patient has no known allergies.    MEDS:  No current facility-administered medications for this visit.  No current outpatient medications on file.    Facility-Administered Medications Ordered in Other Visits:     aspirin (ASA) tablet 325 mg, 325 mg, Oral, Once **OR** aspirin (ASA) chewable tablet 243 mg, 243 mg, Oral, Once, Buffy  STEFFANY Peterson CNP    glucose gel 15-30 g, 15-30 g, Oral, Q15 Min PRN **OR** dextrose 50 % injection 25-50 mL, 25-50 mL, Intravenous, Q15 Min PRN **OR** glucagon injection 1 mg, 1 mg, Subcutaneous, Q15 Min PRN, Stephania Baer APRN CNP    HOLD: apixaban (ELIQUIS) 48 hours pre-procedure, , Does not apply, HOLD, Stephania Baer APRN CNP    lidocaine (LMX4) cream, , Topical, Q1H PRN, Stephania Baer APRN CNP    lidocaine 1 % 0.1-1 mL, 0.1-1 mL, Other, Q1H PRN, Stephania Baer APRN CNP    Patient is NOT allergic to contrast dye, , Does not apply, DOES NOT GO TO MAR, Stephania Baer APRN CNP    Patient is NOT allergic to contrast dye, , Does not apply, DOES NOT GO TO MAR, Stephania Baer APRN CNP    potassium chloride rose ER (KLOR-CON M10) CR tablet 20 mEq, 20 mEq, Oral, Once PRN, Stephania Baer APRN CNP    potassium chloride rose ER (KLOR-CON M10) CR tablet 40 mEq, 40 mEq, Oral, Once PRN, Stephania Baer APRN CNP    sodium chloride (PF) 0.9% PF flush 3 mL, 3 mL, Intracatheter, Q8H, Stephania Baer APRN CNP    sodium chloride (PF) 0.9% PF flush 3 mL, 3 mL, Intracatheter, Q1H PRN, Stephania Baer APRN CNP    sodium chloride (PF) 0.9% PF flush 3 mL, 3 mL, Intracatheter, q1 min prn, Stephania Baer APRN CNP    sodium chloride 0.9 % infusion, , Intravenous, Continuous, Stephania Baer APRN CNP    SOCIAL HABITS:    History   Smoking Status    Never   Smokeless Tobacco    Never     Social History    Substance and Sexual Activity      Alcohol use: Not Currently        Alcohol/week: 0.0 standard drinks of alcohol      History   Drug Use No       FAMILY HISTORY:    Family History   Problem Relation Age of Onset    Coronary Artery Disease No family hx of     Cardiomyopathy No family hx of        PE:  /77 (BP Location: Right arm, Patient Position: Chair, Cuff Size: Adult Regular)   Pulse 65   Wt Readings from Last 1 Encounters:   02/22/24 (P) 192 lb 14.4 oz  (87.5 kg)     There is no height or weight on file to calculate BMI.    EXAM:  GENERAL: well-developed 58 year old male who appears his stated age  CARDIAC: normal   CHEST/LUNG: normal respiratory effort   MUSCULOSKELETAL: grossly normal and both lower extremities are intact, no lower extremity edema  NEUROLOGIC: focally intact, alert and oriented x 3  PSYCH: appropriate affect  VASCULAR:     DIAGNOSTIC STUDIES:     Images:  US Ext Arterial Venous Dialys Acs Graft    Result Date: 2/21/2024  US EXTREMITY ARTERIAL VENOUS DIALYSIS ACCESS GRAFT  2/21/2024 11:11 AM CLINICAL HISTORY/INDICATION: ESRD (end stage renal disease) on dialysis (H); Hemorrhage due to vascular prosthetic devices, implants and grafts, subsequent encounter. COMPARISON: None relevant TECHNIQUE: Grayscale, color-flow, and spectral waveform analysis with velocities were performed of the dialysis access circuit. FINDINGS: The arterial inflow is patent with diameters ranging between 5.8 and 6.6 millimeters. The arterial anastomosis is patent. The fistula is patent. Focal area stenosis of the cephalic outflow vein at the level of the midhumerus with a diameter of 2.7 mm and a peak systolic velocity of 866 cm/s. Total outflow volume is 727 mL/min.     IMPRESSION: Focal area of stenosis of the basilic outflow vein at the level of the midhumerus. Consider fistulogram with possible intervention. INDIGO CAMPUZANO DO   SYSTEM ID:  [id number]    US Abdomen Limited w Abdomen Doppler Limited    Result Date: 2/15/2024  EXAMINATION: US ABDOMEN LIMITED W ABDOMEN DOPPLER LIMITED, 2/15/2024 8:14 AM COMPARISON: CT 10/31/2023, ultrasound 12/19/2016 HISTORY: Status post liver transplant (H); Pre-transplant evaluation for kidney transplant; Cardiovascular disease; End stage renal disease (H); Organ transplant candidate; Essential hypertension; Hyperlipidemia TECHNIQUE:  Gray-scale, color Doppler and spectral flow analysis. FINDINGS: Small volume of simple ascites. Liver:    The liver demonstrates normal homogeneous echotexture. No evidence of a focal hepatic mass. Bile Ducts: Both the intra- and extrahepatic biliary system are of normal caliber.  The common bile duct measures 7 mm in diameter. Gallbladder: The gallbladder is surgically absent. Right kidney:  The right kidney demonstrates normal echotexture with no evidence of a shadowing stone, focal mass or hydronephrosis.   11.0 cm in long axis dimension. Pancreas: Visualized portions of the pancreas are normal in appearance. Visualized portions of the aorta are unremarkable. LIVER DOPPLER: Splenic vein:  Patent continuous normal antegrade direction flow towards the liver, 19 cm/sec. Extrahepatic portal vein:  Patent continuous antegrade flow, 16 cm/sec. Portal vein at anastomosis: Patent continuous antegrade flow, 17 cm/sec. Intrahepatic portal vein:  Patent continuous antegrade flow, 19 cm/sec. Right portal vein flow is antegrade, measuring 10 cm/sec. Left portal vein flow is antegrade, measuring 19 cm/sec. Inferior vena cava: patent with flow toward the heart throughout.. IVC above anastomosis:  158 cm/sec. IVC at anastomosis:  214 cm/sec. Intrahepatic IVC:  127 cm/sec. Extrahepatic IVC:  17 cm/sec. Right, mid, left hepatic veins: Patent with flow towards the inferior vena cava. Extrahepatic hepatic artery: Low resistance waveform with flow towards the liver. 54 cm/sec with resistive index 0.69. Left hepatic artery: 113 cm/sec with resistive index 0.63. Right hepatic artery: 82 cm/sec with resistive index 0.61.     Impression: 1.  Unremarkable appearance of the transplant liver. 2.  Patent hepatic Doppler evaluation. I have personally reviewed the examination and initial interpretation and I agree with the findings. CANELO SHULTZ MD   SYSTEM ID:  [id number]    US Aorta/Ivc/Iliac Duplex Complete [[id number]]    Result Date: 1/30/2024  EXAMINATIONS 1/30/2024 7:07 AM 1. Aorta to femoral arterial ultrasound. 2. Inferior vena cava to  femoral venous ultrasound. CLINICAL HISTORY: ESRD (end stage renal disease) (H); Hypertension; Organ transplant candidate COMPARISONS: None available. ORDERING PROVIDER: CARMEN FITCH TECHNIQUE: Grayscale images obtained from the aorta through the iliac arteries. Color Doppler and Doppler waveform ultrasound images obtained of the distal infrarenal aorta through the femoral arteries. Grayscale, color Doppler, and Doppler waveform ultrasound images obtained from the cava inferior vena cava through the common femoral veins. FINDINGS: AORTA:      Infrarenal, distal: 72/0 cm/s, triphasic. 1.4 cm diameter. RIGHT:      Common iliac artery, proximal: 81/0 cm/s, triphasic. 1.0 cm diameter.      Common iliac artery, distal: 75/0 cm/s, triphasic. 1.2 cm diameter.      External iliac artery, proximal: 109/0 cm/s, triphasic. 0.7 cm diameter.      External iliac artery, mid: 97/0 cm/s, triphasic. 0.7 cm diameter.      External iliac artery, distal: 137/0 cm/s, triphasic. 0.7 cm diameter.      Common femoral artery: 105/0 cm/s, triphasic. 0.9 cm diameter. LEFT:      Common iliac artery, proximal: 78/0 cm/s, triphasic. 1.0 cm diameter.      Common iliac artery, distal: 84/0 cm/s, triphasic. 1.2 cm diameter.      External iliac artery, proximal: 137/0 cm/s, triphasic. 0.8 cm diameter.      External iliac artery, mid: 140/0 cm/s, triphasic. 0..7 cm diameter.      External iliac artery, distal: 144/0 cm/s, triphasic. 2.35  cm diameter.      Common femoral artery: 118/0 cm/s, triphasic. 0.8 cm diameter. PERIPHERAL INFERIOR VENA CAVA: Patent, phasic, 42 cm/s RIGHT:      Common iliac vein: patent, phasic, 18 cm/s      External iliac vein: patent, phasic, 41 cm/s      Common femoral vein: patent, phasic, 16 cm/s, fully compressible LEFT:      Common iliac vein: patent, phasic, 26 cm/s      External iliac vein: patent, phasic, 29 cm/s      Common femoral vein: patent, phasic, 16 cm/s, fully compressible     IMPRESSION: 1. Negative  aorta to femoral arterial ultrasound. Measurements as in the report. 2. Negative caval-femoral venous ultrasound. Measurements as in the report. ARASELI RODRIGUEZ MD   SYSTEM ID:  [id number]    LABS:      Sodium   Date Value Ref Range Status   01/11/2024 135 135 - 145 mmol/L Final     Comment:     Reference intervals for this test were updated on 09/26/2023 to more accurately reflect our healthy population. There may be differences in the flagging of prior results with similar values performed with this method. Interpretation of those prior results can be made in the context of the updated reference intervals.    01/06/2024 127 (L) 135 - 145 mmol/L Final     Comment:     Reference intervals for this test were updated on 09/26/2023 to more accurately reflect our healthy population. There may be differences in the flagging of prior results with similar values performed with this method. Interpretation of those prior results can be made in the context of the updated reference intervals.    01/03/2024 134 (L) 135 - 145 mmol/L Final     Comment:     Reference intervals for this test were updated on 09/26/2023 to more accurately reflect our healthy population. There may be differences in the flagging of prior results with similar values performed with this method. Interpretation of those prior results can be made in the context of the updated reference intervals.    04/20/2020 139 133 - 144 mmol/L Final   10/07/2019 131 (L) 133 - 144 mmol/L Final   02/20/2019 137 133 - 144 mmol/L Final     Urea Nitrogen   Date Value Ref Range Status   01/11/2024 41.2 (H) 8.0 - 23.0 mg/dL Final   01/06/2024 55.5 (H) 8.0 - 23.0 mg/dL Final   01/03/2024 54.6 (H) 8.0 - 23.0 mg/dL Final   12/29/2022 46 (H) 7 - 30 mg/dL Final   12/28/2022 62 (H) 7 - 30 mg/dL Final   12/27/2022 52 (H) 7 - 30 mg/dL Final   04/20/2020 23 7 - 30 mg/dL Final   10/07/2019 18 7 - 30 mg/dL Final   02/20/2019 20 7 - 30 mg/dL Final     Hemoglobin   Date Value Ref Range Status    01/16/2024 8.7 (L) 13.3 - 17.7 g/dL Final   01/11/2024 8.7 (L) 13.3 - 17.7 g/dL Final   01/06/2024 7.8 (L) 13.3 - 17.7 g/dL Final   04/20/2020 14.3 13.3 - 17.7 g/dL Final   10/07/2019 13.7 13.3 - 17.7 g/dL Final   02/20/2019 13.7 13.3 - 17.7 g/dL Final     Platelet Count   Date Value Ref Range Status   01/16/2024 125 (L) 150 - 450 10e3/uL Final   01/11/2024 115 (L) 150 - 450 10e3/uL Final   01/06/2024 79 (L) 150 - 450 10e3/uL Final   04/20/2020 108 (L) 150 - 450 10e9/L Final   10/07/2019 194 150 - 450 10e9/L Final   02/20/2019 125 (L) 150 - 450 10e9/L Final     INR   Date Value Ref Range Status   10/27/2023 1.67 (H) 0.85 - 1.15 Final   10/26/2023 1.70 (H) 0.85 - 1.15 Final   10/17/2023 2.00 (H) 0.85 - 1.15 Final   10/07/2019 1.20 (H) 0.86 - 1.14 Final   10/17/2017 1.26 (H) 0.86 - 1.14 Final   09/19/2017 1.15 (H) 0.86 - 1.14 Final       20 minutes spent on the day of encounter doing chart review, history and exam, documentation, and further activities as noted.     Saima Coker, NP  VASCULAR SURGERY

## 2024-02-22 NOTE — Clinical Note
dry, intact, no bleeding and no hematoma. 4 Fr sheath removed from the right femoral artery. Manual pressure held. Hemostasis obtained. Primapore applied.

## 2024-02-22 NOTE — Clinical Note
Called to Aracely GREEN on 3C. All questions answered. All belongings sent with patient. Patient transported to 3C via stretcher with RAMESH RN.

## 2024-02-22 NOTE — PRE-PROCEDURE
GENERAL PRE-PROCEDURE:   Procedure:  Coronary angiogram with possible coronary intervention  Date/Time:  2/22/2024 10:30 AM    Verbal consent obtained?: Yes    Risks and benefits: Risks, benefits and alternatives were discussed    Consent given by:  Patient  Patient states understanding of procedure being performed: Yes    Patient's understanding of procedure matches consent: Yes    Procedure consent matches procedure scheduled: Yes    Appropriately NPO:  Yes  Mallampati  :  Grade 3- soft palate visible, posterior pharyngeal wall not visible  Lungs:  Lungs clear with good breath sounds bilaterally  Heart:  Normal heart sounds and rate  History & Physical reviewed:  History and physical reviewed and no updates needed  Statement of review:  I have reviewed the lab findings, diagnostic data, medications, and the plan for sedation

## 2024-02-22 NOTE — PROGRESS NOTES
D/I/A:  Pt tolerated post coronary angiogram recovery well. Pt has prior RUQ hematoma upon arrival, pt stated he would follow up with vascular provider.  Patient tolerated oral intake and liquids. Ambulated without any difficulty. Pt does not produce any urine. Right groin puncture sites is stable with no bleeding and no hematoma. Patient has a . A+O x4 and making needs known.  CCL access sites C/D/I; no bleeding or hematoma; CMS intact.  VSSA.  Sinus bradycardia/rhythm  on monitor.  IV access removed.  Education completed and outlined in AVS or handout. Discharge instructions reviewed with patient.  Questions answered prior to discharge.  Belongings returned to patient at discharge.      P: Pt's wife Ofe transported patient home. Patient to follow up with appts as per discharge instruction.

## 2024-02-22 NOTE — PROGRESS NOTES
Pt arrived for CORS. VSS on RA, denies pain. PIV infusing at 10 mL/hr, fistula to L arm. Labs resulted, see MAR. Pt took 325 mg of ASA this AM. Groin area prepped. Pedal pulses marked. Consent needs to be signed. H&P current. Wife at the bedside.

## 2024-02-22 NOTE — PROGRESS NOTES
D/I/A: Pt roomed on 3C in [place] [address]. Arrived via litter and accompanied by CCL RN Kassy HARPER.  Rhythm upon arrival: Sinus bradycardia on monitor.  Denies pain or sob.  Reviewed activity restrictions and when to notify RN, ie-changes to breathing or increased chest pressure or chest pain.  CCL access:  Right groin site w/primapore in place. No bleeding or hematoma. Pulses present.     P: Continue to monitor status and notify MD with any changes or concerns.  Discharge to home once meeting criteria.

## 2024-02-23 ENCOUNTER — TELEPHONE (OUTPATIENT)
Dept: CARDIOLOGY | Facility: CLINIC | Age: 59
End: 2024-02-23
Payer: COMMERCIAL

## 2024-02-23 NOTE — CONFIDENTIAL NOTE
S-(situation): patient had coronary angiogram on 2/22 for kidney transplant evaluation.   There are no hemodynamically significant coronary artery stenoses.  Right femoral artery used for access. Patient states he hasn't looked at the access site yet. Let him know that it should be flat and dry, bruising to be expected and he should call us if this is not the case.    B-(background): Mr Osborne is a 59 yo m with history of  alcoholic cirrhosis with liver transplant in 2016, T2DM, chronic immunossuppression, PAF, prior stroke, HTN, seizure, CHRISTIAN and respiratory arrest resulting in ESRD requiring dialysis who is here for coronary angiogram for pretransplant evaluation.    A-(assessment): no coronary artery stenosis    R-(recommendations): follow up with [facility] cardiology NP.

## 2024-02-26 ENCOUNTER — NURSE TRIAGE (OUTPATIENT)
Dept: FAMILY MEDICINE | Facility: CLINIC | Age: 59
End: 2024-02-26
Payer: COMMERCIAL

## 2024-02-26 LAB
ATRIAL RATE - MUSE: 63 BPM
DIASTOLIC BLOOD PRESSURE - MUSE: NORMAL MMHG
INTERPRETATION ECG - MUSE: NORMAL
P AXIS - MUSE: -21 DEGREES
PR INTERVAL - MUSE: 156 MS
QRS DURATION - MUSE: 72 MS
QT - MUSE: 434 MS
QTC - MUSE: 444 MS
R AXIS - MUSE: 2 DEGREES
SYSTOLIC BLOOD PRESSURE - MUSE: NORMAL MMHG
T AXIS - MUSE: 31 DEGREES
VENTRICULAR RATE- MUSE: 63 BPM

## 2024-02-26 NOTE — TELEPHONE ENCOUNTER
"S-(situation): pt calling, states something fell on him in his garage and how has 2x2\" hematoma on right bicep with several other bruises in area.    B-(background): has had hematomas in past. Pt is on eliquis.      A-(assessment): pt states hematoma is raised bump, red/purple, andn 8/10 pain. Has tried icing area with little effect.     R-(recommendations): disposition is to be seen in office today, no appts available with PCP or team. Writer recommended pt be seen in UC/ED today, pt would like PCP recommendation.    Please advise    DESI MOJICA RN on 2/26/2024 at 3:23 PM        Reason for Disposition   SEVERE pain and not improved 2 hours after pain medicine/ice packs    Additional Information   Negative: Shock suspected (e.g., cold/pale/clammy skin, too weak to stand, low BP, rapid pulse)   Negative: Fever and purple or blood-colored spots or dots   Negative: Sounds like a life-threatening emergency to the triager   Negative: Bruise(s) of forehead or head   Negative: Bruise(s) of face or jaw   Negative: Patient has a concerning injury (e.g., chest, neck, leg)   Negative: Post-operative bruising   Negative: Dizziness or lightheadedness   Negative: Bruise on head, face, chest, or abdomen and taking Coumadin (warfarin) or other strong blood thinner, or known bleeding disorder (e.g., thrombocytopenia)   Negative: Unexplained bleeding from another site (e.g., gums, nose, urine) as well   Negative: Patient sounds very sick or weak to the triager    Answer Assessment - Initial Assessment Questions  1. APPEARANCE of BRUISE: \"Describe the bruise.\"       Red and purple bump  2. SIZE: \"How large is the bruise?\"       2x2\"  3. NUMBER: \"How many bruises are there?\"       Several   4. LOCATION: \"Where is the bruise located?\"       Right arm  5. ONSET: \"How long ago did the bruise occur?\"       2 weeks ago  6. CAUSE: \"Tell me how it happened.\"      Working in garage and something fell on arm  7. MEDICAL HISTORY: \"Do you " "have any medical problems that can cause easy bruising or bleeding?\" (e.g., leukemia, liver disease, recent chemotherapy)      Cancer hx  8. MEDICINES: \"Do you take any medications which thin the blood such as: aspirin, heparin, ibuprofen (NSAIDS), Plavix, or Coumadin?\"      Eliquis   9. OTHER SYMPTOMS: \"Do you have any other symptoms?\"  (e.g., weakness, dizziness, pain, fever, nosebleed, blood in urine/stool)      Weakness and pain in arm   10. PREGNANCY: \"Is there any chance you are pregnant?\" \"When was your last menstrual period?\"        NA    Protocols used: Bruises-A-OH    "

## 2024-02-26 NOTE — TELEPHONE ENCOUNTER
Reason for Call:  Appointment Request    Patient requesting this type of appt:  pos hematoma on rt bicep    Requested provider: Ki Carter    Reason patient unable to be scheduled: Not within requested timeframe    When does patient want to be seen/preferred time: 1-2 days, PT WOULD PREFER R 02/272024    Comments: pos hematoma on rt bicep    Could we send this information to you in [facility] or would you prefer to receive a phone call?:   Patient would prefer a phone call   Okay to leave a detailed message?: Yes at Cell number on file:    Telephone Information:   Mobile [phone]       Call taken on 2/26/2024 at 9:06 AM by Gena Graff

## 2024-02-26 NOTE — TELEPHONE ENCOUNTER
Called patient and wife per ok from patient. He verbalized understanding. They will discuss if they want to do that and decide later today. Writer again advised UC. They verbalized understanding.

## 2024-02-27 ENCOUNTER — OFFICE VISIT (OUTPATIENT)
Dept: INTERNAL MEDICINE | Facility: CLINIC | Age: 59
End: 2024-02-27
Payer: COMMERCIAL

## 2024-02-27 VITALS
TEMPERATURE: 97.7 F | HEIGHT: 71 IN | HEART RATE: 77 BPM | OXYGEN SATURATION: 99 % | BODY MASS INDEX: 27.3 KG/M2 | WEIGHT: 195 LBS

## 2024-02-27 DIAGNOSIS — T82.898A OTHER SPECIFIED COMPLICATION OF VASCULAR PROSTHETIC DEVICES, IMPLANTS AND GRAFTS, INITIAL ENCOUNTER (H): ICD-10-CM

## 2024-02-27 DIAGNOSIS — D84.9 ACQUIRED IMMUNOCOMPROMISED STATE (H): ICD-10-CM

## 2024-02-27 DIAGNOSIS — L29.9 ITCHING: ICD-10-CM

## 2024-02-27 DIAGNOSIS — S40.021D TRAUMATIC HEMATOMA OF RIGHT UPPER ARM, SUBSEQUENT ENCOUNTER: Primary | ICD-10-CM

## 2024-02-27 PROCEDURE — 99213 OFFICE O/P EST LOW 20 MIN: CPT | Performed by: INTERNAL MEDICINE

## 2024-02-27 RX ORDER — OXYCODONE HYDROCHLORIDE 5 MG/1
2.5-5 TABLET ORAL EVERY 6 HOURS PRN
Qty: 15 TABLET | Refills: 0 | Status: SHIPPED | OUTPATIENT
Start: 2024-02-27 | End: 2024-05-09

## 2024-02-27 NOTE — PROGRESS NOTES
{PROVIDER CHARTING PREFERENCE:556819}    Rolf Simeon is a 58 year old, presenting for the following health issues:  No chief complaint on file.  {(!) Visit Details have not yet been documented.  Please enter Visit Details and then use this list to pull in documentation. (Optional):995539}  HPI     Bruise on right bicep due to injury-please see triage note.     {additonal problems for provider to add (Optional):288718}    {ROS Picklists (Optional):114542}      Objective    There were no vitals taken for this visit.  There is no height or weight on file to calculate BMI.  Physical Exam   {Exam List (Optional):421969}    {Diagnostic Test Results (Optional):368580}        Signed Electronically by: El Overton MD  {Email feedback regarding this note to [email or web address]   :265231}

## 2024-02-27 NOTE — PATIENT INSTRUCTIONS
Large hematoma on the upper right arm.      Overall improved from initial injury, but the large hematoma persists.      Not infected at this time.      Surgery clinic should evaluate this hematoma to determine if or when this will require surgery to evacuate the hematoma.       Ok for rare occasional use of oxycodone.       Place warm compresses onto the area 2-3 times per day for 10-15 minutes to help stimulate local blood flow.     Go to the ER immediately (regardless of what state you are in) if you develop any sudden worsening of pain, redness, warmth as these can be signs of infection.

## 2024-02-27 NOTE — PROGRESS NOTES
Assessment & Plan   Problem List Items Addressed This Visit       Acquired immunocompromised state (H24)     Other Visit Diagnoses       Traumatic hematoma of right upper arm, subsequent encounter    -  Primary    Itching        Other specified complication of vascular prosthetic devices, implants and grafts, initial encounter (H24)        Relevant Medications    oxyCODONE (ROXICODONE) 5 MG tablet              Large hematoma on the upper right arm, no evidence for infection.       Overall improved from initial injury, but the large hematoma persists.      Not infected at this time.      Surgery clinic should evaluate this hematoma to determine if or when this will require surgery to evacuate the hematoma.       Ok for rare occasional use of oxycodone.       Place warm compresses onto the area 2-3 times per day for 10-15 minutes to help stimulate local blood flow.     Go to the ER immediately (regardless of what state you are in) if you develop any sudden worsening of pain, redness, warmth as these can be signs of infection.                 Rolf Simeon is a 58 year old, presenting for the following health issues:  Mass (Saw vascular surgeon one week ago and they did not have concern for this. )    Mass  This is a new problem. The current episode started 1 to 4 weeks ago. The problem has been gradually improving. He has tried ice and heat for the symptoms. The treatment provided no relief.          Large hematoma on right upper arm after fall.   The entire right arm was brusied, which has resovled.   Since that time, he has been left with residual large firm hematoma on right upper bicep area.  Intact  strength.     Seen bu Vascular surgery last week who recommended continued observation.     The patient is leaving for a trip to [place] in 2 weeks and wants to know if OK to travel.        **I reviewed the information recorded in the patient's EPIC chart (including but not limited to medical history,  "surgical history, family history, problem list, medication list, and allergy list) and updated the information as indicated based on the patients reported information.         Review of Systems  Constitutional, HEENT, cardiovascular, pulmonary, gi and gu systems are negative, except as otherwise noted.      Objective    Pulse 77   Temp 97.7  F (36.5  C) (Temporal)   Ht 1.803 m (5' 11\")   Wt 88.5 kg (195 lb)   SpO2 99%   BMI 27.20 kg/m    Body mass index is 27.2 kg/m .  Physical Exam   GENERAL alert and no distress  EYES:  Normal sclera,conjunctiva, EOMI  HENT: facies symmetric  MS: extremities- large firm hematoma in upper right arm, approx 6 cm in diameter, not warm to touch, not tender.  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  Palpable thrill.   PSYCH: Alert and oriented times 3; speech- coherent  SKIN:  No obvious significant skin lesions on visible portions of face   NEURO:  Normal facial movements.  Appears to move normally             Signed Electronically by: El Overton MD    "

## 2024-02-28 ENCOUNTER — TELEPHONE (OUTPATIENT)
Dept: GASTROENTEROLOGY | Facility: CLINIC | Age: 59
End: 2024-02-28
Payer: COMMERCIAL

## 2024-02-28 NOTE — TELEPHONE ENCOUNTER
Contacted pt to remind them of their PAC appt prior to their endoscopy procedure. Pt stated they will schedule an appt

## 2024-02-29 NOTE — LETTER
ICC PEDIATRIC NOTE    CHIEF COMPLAINT:    Chief Complaint   Patient presents with    Abdominal Pain     Pt. Presents with c/o lower abdominal pain today. Dad gave Gas X and pain has resolved.    1734 examined    HPI:    This is a 4 year old male who presented to the immediate care with the history of having the onset this afternoon about 1430 at the end of school of suprapubic abdominal pain.  Father took him to the bathroom and he had what appeared to be a normal bowel movement but did not seem to improve immediately.  He was complaining of suprapubic pain and was somewhat uncomfortable and wanted to keep his hips flexed.  His symptoms are now completely resolved.  He had no vomiting he denies abdominal pain presently.  No recent cough upper respiratory infection headache visual disturbances or fever.  No chest pain shortness of breath sore throat.  No back pain.  No skin rash, bleeding or bruising problems, polydipsia polyuria polyphagia.  He is never had this before.  He has been drinking and urinating normally with no dysuria.  His father notes that he typically has several bowel movements daily but since starting school has reported that he holds his stool during the school day.    REVIEW OF SYSTEMS:    See above for constitutional, neurologic, eyes, ENT, respiratory, cardiovascular, GI, genitourinary, musculoskeletal, hematologic, skin, endocrine    ALLERGIES:    ALLERGIES:  No Known Allergies    CURRENT MEDICATIONS:    No current outpatient medications on file.     No current facility-administered medications for this visit.       PAST MEDICAL HISTORY:    Past Medical History:   Diagnosis Date    No known problems        SURGICAL HISTORY:    Past Surgical History:   Procedure Laterality Date    Circumcision, primary      No past surgeries         SOCIAL HISTORY:    Social History     Tobacco Use    Smoking status: Never    Smokeless tobacco: Never       FAMILY HISTORY:    Family History   Problem Relation Age  OUTPATIENT OR TRANSITIONAL CARE  LABORATORY TEST ORDER                                       [facility]  Fax [phone]    Patient Name: Blanco Osborne  Transplant Date: 9/20/2016   YOB: 1965  Issue Date: 02/22/19   [facility] MR#: [id number]        Diagnoses: [x]      Liver Transplant (ICD-10 Z94.4)    [x]      Elevated liver tests (ICD-10 R 94.5)     Please fax results to [phone]    Frequency: Month of March 2019            [x] Hepatic panel (Albumin, Alk Phos, ALT, AST, Direct and Total Bili)          If you have questions, please call [phone] or [phone].    .   of Onset    Diabetes Maternal Grandmother     Diabetes Maternal Grandfather     Diabetes Paternal Grandmother     Diabetes Paternal Grandfather        PHYSICAL EXAM:   Visit Vitals  Pulse 92   Temp 98.8 °F (37.1 °C) (Temporal)   Resp 26   Wt (!) 28.2 kg (62 lb 2.7 oz)   SpO2 100%     Pulse oximetry is 100% on room air consider normal.   Physical Exam  Constitutional:       General: He is active. He is not in acute distress.     Appearance: Normal appearance. He is well-developed. He is not toxic-appearing.   HENT:      Right Ear: Tympanic membrane, ear canal and external ear normal.      Left Ear: Tympanic membrane, ear canal and external ear normal.      Mouth/Throat:      Mouth: Mucous membranes are moist.      Pharynx: Oropharynx is clear. No posterior oropharyngeal erythema.      Neck: Normal range of motion and neck supple. No rigidity. No muscular tenderness.   Eyes:      Extraocular Movements: Extraocular movements intact.      Conjunctiva/sclera: Conjunctivae normal.      Pupils: Pupils are equal, round, and reactive to light.   Cardiovascular:      Rate and Rhythm: Normal rate and regular rhythm.      Pulses: Normal pulses.      Heart sounds: No murmur heard.  Pulmonary:      Effort: Pulmonary effort is normal. No respiratory distress, nasal flaring or retractions.      Breath sounds: Normal breath sounds. No stridor or decreased air movement. No wheezing, rhonchi or rales.   Abdominal:      General: Abdomen is flat. Bowel sounds are normal. There is no distension.      Palpations: Abdomen is soft. There is no mass.      Tenderness: There is no abdominal tenderness. There is no guarding or rebound.      Comments: Patient is giggling to deep and vigorous palpation of the abdomen throughout.  He is smiling and giggling while jumping up and down repeatedly   Musculoskeletal:         General: Normal range of motion.      Comments: Normal testes which are nontender.  No swelling.  No hernias   Lymphadenopathy:       Cervical: No cervical adenopathy.   Skin:     General: Skin is warm and dry.      Capillary Refill: Capillary refill takes less than 2 seconds.      Findings: No rash.   Neurological:      Mental Status: He is alert.          RADIOLOGY AND LAB RESULTS:  No results found for this visit on 02/29/24.          No image results found.        Procedure:  [email or web address]@     MEDICAL DECISION MAKING: The etiology of the patient's symptoms is unclear but does not appear to be related to a surgical problem.  This may be related to holding the stool during the school day.  Will have parents increase his fruits and vegetables particularly prunes apples and pears and have follow-up with Dr. Almeida or to go to the emergency department for worsening symptoms including pain localizing to the right lower part of the abdomen that would suggest appendicitis or if he develops testicular pain      DIAGNOSIS:  ED Diagnosis   1. Abdominal pain, unspecified abdominal location      Resolved undetermined etiology            PLAN:      Nithin Mackenzie MD  2/29/2024

## 2024-03-06 ENCOUNTER — TELEPHONE (OUTPATIENT)
Dept: FAMILY MEDICINE | Facility: CLINIC | Age: 59
End: 2024-03-06
Payer: COMMERCIAL

## 2024-03-06 DIAGNOSIS — Z99.2 ESRD (END STAGE RENAL DISEASE) ON DIALYSIS (H): Primary | ICD-10-CM

## 2024-03-06 DIAGNOSIS — N18.6 ESRD (END STAGE RENAL DISEASE) ON DIALYSIS (H): Primary | ICD-10-CM

## 2024-03-06 NOTE — TELEPHONE ENCOUNTER
CC: Patient's wife Ofe (C2[employer]) calling on behalf of the patient.    Patient is currently traveling in [place] and patient forgot his sevelamer carbonate (Renvela) rx. Requesting for short term rx to be sent to [facility] in [place]. Patient has enough medication for at least today. Ofe states this is prescribed by patient's nephrologist. Pended refill and routing to Dr. Bradshaw to please refill if appropriate - thank you!     Please route to appropriate team to call patient and notify him that refill was sent     Callback [phone] - ok to leave detailed VM     Italo Soliman RN  [facility]

## 2024-03-06 NOTE — PATIENT INSTRUCTIONS
You were seen in the cardiology clinic by: Stephania Baer CNP    Plan:     Medication Changes:   - Start Aspirin 81mg daily    Labs/Tests Needed:   - Use the number below to call and schedule your 2 week Zio patch monitor.    Follow up visit:   - Follow-up with cardiology in 12-18 months; sooner with symptoms    Additional Instructions:     - Ideally we would like you on a low dose statin (cholesterol medication) to keep your LDL (bad cholesterol) low. Please discuss with your liver doctor and see if they are OK with a low dose statin.     Important Numbers:     Cardiology   Telephone Number     To schedule an appointment or leave a message for your care team:   [phone]      Press #1   To reach the billing department: [phone]      Press # 3     To obtain copies of your medical records: [phone]      Press # 4   To reach the on-call cardiologist for after hours or on weekends: [phone]     Option #4, and ask to speak to the      Cardiovascular Clinic:   [address] [facility] [place] [address]  [phone]      Thank you for trusting us with your health care needs!

## 2024-03-06 NOTE — PROGRESS NOTES
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 He presents today for cardiovascular risk assessment as part of pre-kidney transplant evaluation and follow up after his coronary angiogram.     He most recently underwent coronary angiogram on 2/22/24 with Dr. Dimas which revealed a 20% stenosed mLAD and otherwise normal coronary arteries. His most recent echocardiogram shows an EF of 60-65%, no WMA's, and mild aortic valve insufficiency.     He is a never/non-smoker. He does not currently use alcohol or drugs; has not had a drink in years. His activity level is mild. He has pulmonary rehab 2 x week for one hour. Uses recumbent bike, treadmill, and free weights during his sessions.  He denies symptoms of chest pain, dizziness, lightheadedness, palpitations, shortness of breath, orthopnea, PND, or edema. He is able to complete ADL's and use stairs without any of the aforementioned symptoms.     Cardiovascular Risk Factor Profile:  coronary artery disease, hypertension, male age >45    Current Cardiovascular Symptoms:  - None    ACC HF Stage:  Stage B    NYHA Class:  Class I    Functional Capacity:  Performs 4 METS exercise without symptoms (e.g., light housework, stairs, 4 mph walk, 7 mph bike, slow step dance)        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    Cancer (H)  2023    Squamous Cell Cancer- Since resolved    Cirrhosis of liver with ascites (H) 02/11/2016    Coagulopathy (H24)     Critical illness myopathy     Dialysis patient (H24)     DIALYSIS 3X/ WEEK    Embolic stroke (H) 11/20/2022    Encephalopathy     ESRD (end stage renal disease) on dialysis (H)     Gastroesophageal reflux disease     H/O alcohol abuse     History of blood transfusion     Hyperammonemia (H24)     Hyperlipidemia     Hypertension     Patients states that HTN has been resolved    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    Insomnia     Lactic acidosis 11/12/2022    Liver replaced by transplant (H) 09/20/2016    NAFLD (nonalcoholic fatty liver disease) 02/11/2016    CHRISTIAN on CPAP     Parainfluenza type 1 infection 11/19/2022    Parapneumonic effusion     Pleural effusion     Pneumothorax on left 12/16/2022    Respiratory acidosis 11/12/2022    Respiratory arrest (H) 11/12/2022    Restless legs syndrome (RLS)     SBP (spontaneous bacterial peritonitis) (H)     [facility]    Seizures (H) 12/2022    Sepsis due to Streptococcus pneumoniae with acute hypoxic respiratory failure (H) 11/19/2022    Stroke, embolic (H) 11/20/2022    Sudden cardiac arrest (H) 12/29/2022    PEA- 2 min of CPR    Tubular adenoma 10/2019    Large cecal adenoma- due for surveillance colonoscopy in 3 years (10/2022)       Past Surgical History:   Procedure Laterality Date    APPENDECTOMY      BENCH LIVER N/A 09/20/2016    Procedure: BENCH LIVER;  Surgeon: Enoc Crews MD;  Location: [facility] OR    BRONCHOSCOPY FLEXIBLE AND RIGID N/A 12/20/2022    Procedure: BRONCHOSCOPY;  Surgeon: Alena Valenzuela MD;  Location:  GI    COLONOSCOPY  08/06/2013    repeat in 2018    COLONOSCOPY N/A 10/04/2019    Procedure: COLONOSCOPY, WITH POLYPECTOMY AND BIOPSY;  Surgeon: Go Chong MD;  Location: [facility] OR    CREATE FISTULA ARTERIOVENOUS UPPER EXTREMITY Left 03/21/2023    Procedure: LEFT UPPER EXTREMITY ARTERIOVENOUS FISTULA  CREATION. LIGATION OF COMPETING VASCULAR BRANCHES- LEFT;  Surgeon: Deejay Mcneil MD;  Location:  OR    CV CORONARY ANGIOGRAM N/A 2024    Procedure: Coronary Angiogram;  Surgeon: Nima Dimas MD;  Location:  HEART CARDIAC CATH LAB    CV PERICARDIOCENTESIS N/A 2023    Procedure: Pericardiocentesis;  Surgeon: Barry Mckeon MD;  Location:  HEART CARDIAC CATH LAB    CV PERICARDIOCENTESIS N/A 10/11/2023    Procedure: Pericardiocentesis;  Surgeon: Ulises Bhakta MD;  Location:  HEART CARDIAC CATH LAB    CV RIGHT HEART CATH MEASUREMENTS RECORDED N/A 10/11/2023    Procedure: Right Heart Catheterization;  Surgeon: Ulises Bhakta MD;  Location:  HEART CARDIAC CATH LAB    HERNIA REPAIR      IR CHEST TUBE PLACEMENT NON-TUNNELED RIGHT  2022    IR CVC TUNNEL PLACEMENT > 5 YRS OF AGE  2022    IR CVC TUNNEL PLACEMENT > 5 YRS OF AGE  1/3/2024    IR DIALYSIS FISTULOGRAM LEFT  2023    IR GASTROSTOMY TUBE CHANGE  2023    IR GASTROSTOMY TUBE PERCUTANEOUS PLCMNT  2022    IR PARACENTESIS  2022    IR PARACENTESIS  2022    IR PARACENTESIS  2/10/2016    IR PARACENTESIS  2016    IR THORACENTESIS  2022    IR THORACENTESIS  2020    IR THORACENTESIS  08/10/2020    IR TRANSCATHETER BIOPSY  2022    REVISION FISTULA ARTERIOVENOUS UPPER EXTREMITY Left 8/15/2023    Procedure: 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 2; OUTFLOW REVISION CEPHALIC TO BRACHIAL VEIN;  Surgeon: Deejay Mcneil MD;  Location:  OR    REVISION FISTULA ARTERIOVENOUS UPPER EXTREMITY Left 1/3/2024    Procedure: Excision and repair of LEFT brachiocephalic arteriovenous fistula and vein patch angioplasty;  Surgeon: Deejay Mcneil MD;  Location:  OR    TRACHEOSTOMY N/A 2022    Procedure: TRACHEOSTOMY;  Surgeon: Nilesh Jackson MD;  Location:  OR    TRANSPLANT LIVER RECIPIENT  DONOR N/A 2016     Procedure: TRANSPLANT LIVER RECIPIENT  DONOR;  Surgeon: Enoc Crews MD;  Location: [facility] OR       acetaminophen (TYLENOL) 325 MG tablet, Take 2 tablets (650 mg) by mouth every 8 hours as needed for other (For optimal non-opioid multimodal pain management to improve pain control.)  ciprofloxacin (CIPRO) 250 MG tablet, Take 1 tablet (250 mg) by mouth daily  ELIQUIS ANTICOAGULANT 5 MG tablet, Take 1 tablet (5 mg) by mouth 2 times daily  ferrous sulfate (FEROSUL) 325 (65 Fe) MG tablet, Take 1 tablet (325 mg) by mouth daily for 180 days  gabapentin (NEURONTIN) 100 MG capsule, Take 3 capsules (300 mg) by mouth at bedtime  hydrOXYzine (ATARAX) 25 MG tablet, Take 1 tablet (25 mg) by mouth 3 times daily as needed for itching  Lacosamide (VIMPAT) 100 MG TABS tablet, Take 1 tablet (100 mg) by mouth every evening  lacosamide (VIMPAT) 50 MG TABS tablet, Take 1 tablet (50 mg) by mouth every morning  melatonin 3 MG tablet, Take 1 tablet (3 mg) by mouth At Bedtime (Patient taking differently: Take 3 mg by mouth nightly as needed)  midodrine (PROAMATINE) 10 MG tablet, TAKE 1 TABLET 3 X DAILY. ON DIALYSIS DAYS(, W, ) TAKE 2 EXTRA TABLETS 1 HOUR BEFORE, 1 TABLET WHEN YOU ARRIVE, AND 1 TABLET DURING DIALYSIS  multivitamin RENAL (TRIPHROCAPS) 1 capsule capsule, Take 1 capsule by mouth daily  oxyCODONE (ROXICODONE) 5 MG tablet, Take 0.5-1 tablets (2.5-5 mg) by mouth every 6 hours as needed for severe pain  sevelamer carbonate (RENVELA) 800 MG tablet, Take 800 mg by mouth 4 times daily With meals and snacks  tacrolimus (GENERIC) 0.5 MG capsule, Take 1 capsule (0.5 mg) by mouth 2 times daily  traZODone (DESYREL) 50 MG tablet, Take 1 tablet (50 mg) by mouth at bedtime    No current facility-administered medications on file prior to visit.      Family History   Problem Relation Age of Onset    Coronary Artery Disease No family hx of     Cardiomyopathy No family hx of        Social History     Tobacco Use    Smoking status:  Never    Smokeless tobacco: Never   Substance Use Topics    Alcohol use: Not Currently     Alcohol/week: 0.0 standard drinks of alcohol       No Known Allergies      Review Of Systems:   CONSTITUTIONAL: No report of fevers or chills  RESPIRATORY: No cough, wheezing, SOB, or hemoptysis  CARDIOVASCULAR: see HPI  MUSCULO-SKELETAL: No joint pain or swelling, no muscle pain  NEURO: No paresthesias, syncope, pre-syncope, lightheadness, dizziness or vertigo  ENDOCRINE: No temperature intolerance, no skin/hair changes  PSYCHIATRIC: No change in mood, feeling down/anxious, no change in sleep or appetite  GI: No melena or hematochezia, no change in bowel habits  : On hemodialysis; not making urine   HEME: No easy bruising or bleeding, no history of anemia, no history of blood clots  SKIN: No rashes or sores, no unusual itching      Physical Examination:  Vitals: /79   Pulse 75   Wt 89.6 kg (197 lb 8 oz)   SpO2 97%   BMI 27.55 kg/m    BMI= Body mass index is 27.55 kg/m .    GENERAL APPEARANCE: healthy, alert and no distress  HEENT: no icterus, no xanthelasmas, normal pupil size and reaction, normal palate, mucosa moist  NECK: JVP not elevated , brisk carotid upstroke bilaterally  CHEST: lungs clear to auscultation without rales, rhonchi or wheezes, no use of accessory muscles, no retractions  CARDIOVASCULAR: regular rhythm, normal S1 and S2, no S3 or S4 and no murmur, click or rub.  ABDOMEN: soft, non tender, without hepatosplenomegaly, no masses palpable, bowel sounds normal  EXTREMITIES: warm, no LE edema, DP/PT pulses 2+ bilaterally, no clubbing or cyanosis. Right femoral site soft, flat, non-tender.   NEURO: alert and oriented to person/place/time, normal speech, gait and affect  SKIN: no ecchymoses, no rashes      Laboratory:  Last Comprehensive Metabolic Panel:  Lab Results   Component Value Date     02/22/2024    POTASSIUM 4.0 02/22/2024    CHLORIDE 95 (L) 02/22/2024    CO2 28 02/22/2024    ANIONGAP 16  (H) 2024    GLC 97 2024    BUN 49.2 (H) 2024    CR 6.16 (H) 2024    GFRESTIMATED 10 (L) 2024    KELLY 9.2 2024       Last CBC:  CBC RESULTS:   Recent Labs   Lab Test 24  0916   WBC 2.9*   RBC 2.76*   HGB 8.9*   HCT 27.7*      MCH 32.2   MCHC 32.1   RDW 14.6   PLT 96*       24 EK/11/24 Echocardiogram:  Global and regional left ventricular function is normal with an EF of 60-65%.  Global right ventricular function is normal.  Mild mitral annular calcification is present. Mild aortic valve calcification  and insufficiency present.  Estimated mean right atrial pressure is normal.  No pericardial effusion is present.    24 Coronary Angiogram:    Mid LAD lesion is 20% stenosed.     There are no hemodynamically significant coronary artery stenoses.        Assessment and Plan:     # Non-Obstructive Coronary Artery Disease  # Hx of PEA Arrest  Most recent Lipids done 2/15/24 w/ LDL 33, total 82. Not currently on a statin; likely due to history of liver transplant. Angiogram performed 24 with only 20% stenosis of mLAD lesion and otherwise normal coronaries. His right femoral site is soft, flat, and non-tender. He is without cardiac symptoms.    - Start Aspirin 81mg daily for secondary prevention   - Blood pressure mildly elevated today (137/79) but he reports he received midodrine today for diaysis; 110's/60's is average home readings   - No additional cardiac testing needed prior to transplant  - Follow-up in 12-18 months with your cardiologist; sooner with any symptoms     # Paroxsymal Atrial Fibrillation  # Hx of embolic CVA  [id number] = 3 (+htn, +vasc, +cva). He is currently in NSR. Reports only episode of atrial fibrillation was during hospitalization for pericarditis.  He denies any palpitations, lightheadedness, or dizziness.   - Continue Eliquis 5mg BID for now; OK to transition to Coumadin as needed for transplant  - Not on rate control; would  recommend diltiazem or metoprolol if required     # Overweight (BMI 27)  - Educated patient on the importance of healthy diet and exercise in reducing risk for heart disease   - Encouraged 150 minutes/week of moderate intensity exercise      # Hx of CARRILLO  Still follows with hepatology.   - He will discuss low dose statin use with his hepatologist     RCRI Score:   - High risk surgery (>5% cardiac complication risk) = 1 point   - Serum Creatinine >2.0 mg/dl = 1 point        I have reviewed and updated the patient's Past Medical History, Social History, Family History and Medication List.       STEFFANY Thomas, CNP  [facility] Cardiology      Review of prior external note(s) from - care everywhere, hepatology, cardiology, SOT  Review of the result(s) of each unique test - EKG, Echocardiogram, angiogram, Holter  Ordering of each unique test  Prescription drug management- medication reconciliation, start aspirin 81mg     35 minutes spent by me on the date of the encounter doing chart review, review of outside records, review of test results, patient visit, and documentation     STEFFANY Gibbs CNP on 3/27/2024 at 3:04 PM

## 2024-03-08 RX ORDER — SEVELAMER CARBONATE 800 MG/1
800 TABLET, FILM COATED ORAL 4 TIMES DAILY
Qty: 120 TABLET | Refills: 0 | Status: SHIPPED | OUTPATIENT
Start: 2024-03-08

## 2024-03-08 RX ORDER — BISACODYL 5 MG/1
TABLET, DELAYED RELEASE ORAL
Qty: 4 TABLET | Refills: 0 | Status: SHIPPED | OUTPATIENT
Start: 2024-03-08 | End: 2024-04-25

## 2024-03-08 NOTE — TELEPHONE ENCOUNTER
Standard Golytely Bowel Prep. Instructions were sent via [employer]. Bowel prep was sent 3/8/2024 to    [facility] #[id number] - [place], [place] - [address] AT [facility] [address]    Sugar Monzon RN Colorectal Cancer    Division of Gastroenterology at [facility]

## 2024-03-08 NOTE — TELEPHONE ENCOUNTER
Per chart review, does not appear refill was addressed by nephrologist.    Routing to PCP to please advise if able to provide short term refill for patient or if patient should call nephology department? Thank you!     Italo Soliman RN  [facility]

## 2024-03-12 ENCOUNTER — TELEPHONE (OUTPATIENT)
Dept: GASTROENTEROLOGY | Facility: CLINIC | Age: 59
End: 2024-03-12
Payer: COMMERCIAL

## 2024-03-12 NOTE — LETTER
March 12, 2024      Blanco Osborne  [address]  [facility] [address]                Dear Blanco,          Please contact our pre assessment nursing team at [phone] option 4 to complete the required pre assessment for your upcoming colonoscopy procedure that is scheduled on 3/26/24. We are open Monday through Friday, 7:00am to 5:00pm. Note that failure to complete Nurse assessment phone call will result in your procedure being cancelled.     Our schedules fill up quickly and are in high demand. If you know that you need to cancel, please contact us so that we can accommodate scheduling for other patients. Our endoscopy scheduling team can be reached at [phone] option 2.           Thank you,  [facility] Endoscopy Team                        Colonoscopy      Procedure date: 3/26/24    Anticipated arrival time: 12:30 PM   (Procedure Times are Subject to Change)    Facility location: [facility]; [address] [place] [address], [place] [address] - Check in location: 1st Floor [facility]. Parking information: Self pay parking available in [facility]. The [facility] is  located across [facility] from the [facility] and is connected to the  hospital by a skyway. The skyway access is on Level C of the parking ramp.   parking is available Monday through Friday from 7 a.m.-3 p.m.  parking attendants are stationed at Door 2 at the [facility] entrance,  located off of [address]      Important Procedure Reminders:     A Pre-Operative Physical is required prior to your upcoming procedure.     Please be sure to schedule a pre-operative physical with your primary care provider within 30 days of your procedure date. If your primary care provider is outside the [facility] system, please have your clinic fax report to [phone].     If you do not have a primary care provider, an in-person clinic appointment can also be scheduled with our Pre-Operative Assessment Center (PAC) at [facility]  [facility] located at [address] [place] [id number]. Virtual PAC appointments will not be accepted. To schedule an in-person PAC visit, please call the Pre-Operative Assessment Center at [phone].     Prep Instructions:   Instructions on how to prepare for your upcoming procedure are found below. Please read instructions carefully. Deviation from instructions may result in less than desired outcomes and procedure may need to be rescheduled.   If you have additional questions regarding how to prepare for your upcoming procedure, contact our endoscopy pre assessment nurses at [phone] option 4 Monday through Friday 7:00am-5:00pm     Policy:   On the day of your procedure, please designatean adult(s) who can drive you home and stay with you for 24 hours post procedure. The medicines used in the exam will make you sleepy. You will not be able to drive. You cannot take public transportation, ride share services, or non-medical taxi service without a responsible caregiver.    Medical transport services are allowed with the requirement that a responsible caregiver will receive you at your destination.  We require that drivers and caregivers are confirmed prior to your procedure.    Day of procedure:  Please do not wear jewelry (i.e. earrings, rings, necklaces, watches, etc) .   Leave your purse, billfold, credit cards, and other valuables at home.   Bring insurance card and ID.     To cancel or reschedule your procedure:   Within 14 days of your procedure if you develop any flu-like symptoms (such as fever, cough, shortness of breath), COVID-19 like symptoms or exposure to COVID-19, contact our endoscopy team at [phone] option 4 to determine if procedure can be completed or needs to be delayed.   If you need to cancel or reschedule, our endoscopy scheduling team can be reached at [phone], option 2. Monday through Friday, 7:00am-5:00pm.    Medication  Reminders:    Please note the following medication holding recommendations:   Blood thinner/Anti-platelet medication(s):  Apixaban (Eliquis): Recommended HOLD 2 days before procedure.  Consult with your managing provider.  Ferrous Sulfate (iron supplement): HOLD 7 days before procedure.    ----------------------------------------------------------------------------------------------------------------------------------------------------------------------------------------------------------------------------------------------------------------------    STANDARD Golytely (Colyte, Nulytely)  Prep Instructions for your Colonoscopy  Pre-Assessment Phone Number: [facility]; [phone] option 4    Bowel prep has been sent to    [facility] [employer] #[id number] - [place], [place] - [address] AT [facility] [address]    Please read these instructions carefully at least 7 days prior to your colonoscopy procedure. Be sure to follow all directions completely. The inside of your colon must be clean to allow for a complete examination for the presence of any growths, polyps, and/or abnormalities, as well as their biopsy or removal. A number of tips are included in order to make this part of the procedure as comfortable as possible.    Getting ready:   You will receive a call from a Nurse to review instructions and health history.  This assessment must be completed prior to your procedure.  Failure to complete the Nurse assessment phone call may result in the procedure being cancelled.  You must arrange for an adult to drive you home after your exam. Your colonoscopy cannot be done unless you have a ride. If you need to use public transportation, someone must ride with you and stay with you for a minimum of 6-24 hours.  Check with your insurance company to be sure they will cover this exam.  If you have diabetes:  Ask to have your exam early in the morning.  Also, ask your doctor if you should change your diet or  medicines.    7 days before the exam:  Talk to your prescribing provider: If you take blood thinners (such as Coumadin, Plavix, Xarelto, Eliquis, Lovenox, or others), these medications may need to be stopped temporarily before your procedure. Your prescribing provider will tell you what to do.   Talk to your prescribing provider: If you take prescription NSAIDS (such as Sulindac, Celebrex, Mobic, Relafen). Your prescribing provider will tell you what to do.   Stop taking fiber supplements (bran, Metamucil, Fibercon), multi-vitamins with iron, and medicines that contain iron.  Continue taking prescribed aspirin; talk to your prescribing doctor with any concerns.  Stop eating corn, popcorn, nuts and foods that contain seeds. These can stay in the colon for many days and they can clog up the colonoscope.     3 days before the exam:  Begin a low-fiber diet: No raw fruits or vegetables, whole wheat, seeds, nuts, popcorn or other high-fiber foods (see list below). No Olestra (a fat substitute).    One day before the exam:  You can have a light, low-fiber breakfast. But drink only clear liquids after 9 a.m. (see list below). Drink at least 8 to 10 full glasses of clear liquids during the day.   Stop taking NSAID pain relievers, such as Advil, Ibuprofen, Motrin, etc.  You may take Tylenol.  Fill the jug that contains the Golytely powder with warm water. Cover and shake until well mixed. Use a full gallon of water. Chill for 3 hours, but do not add ice.  Note: You will start drinking half of the Golytely solution at 6 p.m. The timing of drinking the 2nd half of the Golytely solution depends on your appointment arrival time. See Steps 1-2 below.    Step One:  At 3 p.m., take 2 tablets of Dulcolax (bisacodyl).  At 6 p.m. start drinking the Golytely solution. Drink an 8-ounce glass every 15 minutes until the jug is half empty. Drink each glass quickly.   After you start drinking the solution, stay near a toilet. You may have  watery stools (diarrhea), mild cramping, bloating , and nausea.   You may want to use Vaseline on the skin around your anus after each bowel movement to prevent irritation. You can also use wet wipes to prevent irritation.    Step Two:   If you arrive after 11 AM:  At 6 AM on the day of the exam:  Take 2 tablets of Dulcolax (Bisacodyl).   Drink the other half of the Golytely jug.   Drink one 8-ounce glass every 15 minutes until the jug is empty.   Drink each glass quickly.   You should finish the prep 4 hours before the exam.      Day of exam:    You may drink clear liquids only up until 2 hours before your arrival time.  You may take your necessary morning medications with sips of water  Do not take diabetes medicine by mouth until after your exam.  Do not smoke, chew tobacco, or swallow anything, including water or gum for at least 2 hours before your arrival time. This is a safety issue. Your procedure could be cancelled if you do not follow directions.  Please do not wear jewelry (i.e. earrings, rings, necklaces, watches, etc) . Leave your purse, billfold, credit cards, and other valuables at home.   Please arrive with a responsible adult who can take you home after the test and stay with you for a minimum of 24 hours: The medicine used will make you sleepy and forgetful. If you do not have someone to take you home, we will cancel your procedure. If using public transportation you must have someone to ride with you.  Please perform your nebulizer treatments and airway clearance therapy in the morning prior to the procedure (if applicable).  If you have asthma, bring your inhalers.      CLEAR LIQUID DIET   You may have:  Water, tea, coffee (no milk or cream)  Soda pop, Gatorade (not red or purple)  Jell-O, Popsicles (no milk or fruit pieces - not red or purple)  Fat-free soup broth or bouillon  Plain hard candy, such as clear life savers (not red or purple)  Clear juices and fruit-flavored drinks, such as apple  juice, white grape juice, Hi-C, and Abraham-Aid (not red or purple)   Do not have:  Milk or milk products such as ice cream, malts or shakes, or coffee creamer  Red or purple drinks of any kind such as cranberry juice or grape juice. Avoid red or purple Jell-O, Popsicles, Abraham-Aid, sorbet, sherbet and candy  Juices with pulp such as orange, grapefruit, pineapple or tomato juice  Cream soups of any kind  Alcohol and beer  Protein drinks or protein powder         LOW FIBER DIET   You may have:    Starches: White bread, rolls, biscuits, croissants, [place] toast, white flour tortillas, waffles, pancakes, [place] toast; white rice, noodles, pasta, macaroni; cooked and peeled potatoes; plain crackers, saltines; cooked farina or cream of rice; puffed rice, corn flakes, Rice Krispies, Special K   Vegetables: tender cooked and canned, vegetable broths  Fruits and fruit juices: Strained fruit juice, canned fruit without seeds or skin (not pineapple), applesauce, pear sauce, ripe bananas, melons (not watermelon)   Milk products: Milk (plain or flavored), cheese, cottage cheese, yogurt (no berries), custard, ice cream    Proteins: Tender, well-cooked ground beef, lamb, veal, ham, pork, chicken, turkey, fish or organ meats; eggs; creamy peanut butter   Fats and condiments:  Margarine, butter, oils, mayonnaise, sour cream, salad dressing, plain gravy; spices, cooked herbs; sugar, clear jelly, honey, syrup   Snacks, sweets and drinks: Pretzels, hard candy; plain cakes and cookies (no nuts or seeds); gelatin, plain pudding, sherbet, Popsicles; coffee, tea, carbonated ( fizzy ) drinks Do not have:    Starches: Breads or rolls that contain nuts, seeds or fruit; whole wheat or whole grain breads that contain more than 1 gram of fiber per slice; cornbread; corn or whole wheat tortillas; potatoes with skin; brown rice, wild rice, kasha (buckwheat), and oatmeal   Vegetables: Any raw or steamed vegetables; vegetables with seeds; corn in any  form   Fruits and fruit juices: Prunes, prune juice, raisins and other dried fruits, berries and other fruits with seeds, canned pineapple juices with pulp such as orange, grapefruit, pineapple or tomato juice  Milk products: Any yogurt with nuts, seeds or berries   Proteins: Tough, fibrous meats with gristle; cooked dried beans, peas or lentils; crunchy peanut butter  Fats and condiments: Pickles, olives, relish, horseradish; jam, marmalade, preserves   Snacks, sweets and drinks: Popcorn, nuts, seeds, granola, coconut, candies made with nuts or seeds; all desserts that contain nuts, seeds, raisins and other dried fruits, coconut, whole grains or bran.          FAQ:    How do you know if your colon is cleaned out?   After completing the bowel prep, your bowel movements should be all liquid and yellow. Your bowel movements will look similar to urine in the toilet. If there are pieces of stool (poop) in the toilet, or if you can't see to the bottom of the toilet, please call our office for advice. Call [phone] and ask to speak with a nurse.   Why do you need a responsible  to take you home and stay with you?  We require a responsible adult to take you home for your safety. The sedation medicines used to relax you during the procedure can impair your judgement and reaction time, make you forgetful and possible a little unsteady. Do not drive, make any important decisions, or sign any legal documents for 24 hours after your procedure.   It is normal to feel bloated and gassy after your procedure. Walking will help move the air through your colon. You can take non-aspirin pain relievers that contain acetaminophen (Tylenol).   When can you eat after your procedure?  You may resume your normal diet when you feel ready, unless advised otherwise by the doctor performing your procedure. Do not drink alcohol for 24 hours after your procedure.   You many resume normal activities (work, exercise, etc.) after 24 hours.    When will you get test results?  You should have your procedure results and any lab results (if applicable) by letter, MyChart message, or phone call within 2 weeks. If you have any questions, please call the doctor that referred you for the procedure.       Thank you for choosing [facility] for your procedure. If you are sent a survey regarding your care, please take the time to complete the questionnaire. We value your feedback!             Updated: 6/22/2022

## 2024-03-12 NOTE — TELEPHONE ENCOUNTER
"Attempted to contact patient in order to complete pre assessment questions.     Patient answered but stated it was not a good time. Instructed patient to return call to [phone] option 4    Did advise patient that a pre op physical is required and he should schedule this with PCP clinic. Patient verbalized understanding.    Missed call communication sent via [employer] and letter.       Procedure details:    Patient scheduled for Colonoscopy  on 3/26/24.     Arrival time: 1230. Procedure time 1330    Pre op exam needed? Yes.  Patient needs to complete a pre-operative exam prior to procedure.  Informed patient pre-op is needed via [employer] message, Letter, and telephone call.    Facility location: [facility]; [address] [place] [address] [place] [address]    Sedation type: MAC    Indication for procedure: Screening, hx colon polyps      Chart review:     Electronic implanted devices? No    Recent diagnosis of diverticulitis within the last 6 weeks? No    Diabetic? No      Medication review:    Anticoagulants? Yes Apixaban (Eliquis): Recommended HOLD 2 days before procedure.  Consult with your managing provider.    NSAIDS? No NSAID medications per patient's medication list.  RN will verify with pre-assessment call.    Other medication HOLDING recommendations:  Ferrous sulfate (iron supplements): HOLD 7 days before procedure.      Prep for procedure:     Bowel prep recommendation: Standard Golytely. Bowel prep prescription previously sent to [facility] [employer] #[id number] - [place], [place] - [address] AT [facility] [address] by CRC nursing team on 3/8/24.  Due to: CKD noted. , dialysis noted/reported., and ESRD.    Prep instructions sent via [employer] and letter due to as of time of this note, patient has not logged into [employer] since 2020 . However patient did report in scheduling questionnaire: \"Are you active on [employer]? Yes\"      Rocio Fung RN  Endoscopy Procedure Pre Assessment RN         "

## 2024-03-15 NOTE — TELEPHONE ENCOUNTER
FUTURE VISIT INFORMATION      SURGERY INFORMATION:  Date: 3/25/2024  Location:  GI   Surgeon:  Jie Douglas MD   Anesthesia Type:  MAC  Procedure: Colonoscopy     RECORDS REQUESTED FROM:       Primary Care Provider: Ki Carter     Pertinent Medical History:    Most recent EKG+ Tracin2024     Most recent ECHO: 2024     Most recent Coronary Angiogram:     Most recent Sleep Study:  [facility]

## 2024-03-15 NOTE — TELEPHONE ENCOUNTER
Pre assessment completed for upcoming procedure.   (Please see previous telephone encounter notes for complete details)      Procedure details:    Arrival time and facility location reviewed.    Pre op exam needed? Yes. Patient warm transferred to PAC scheduling line to make appt per pt request. Patient was informed that an in-person evaluation is required, CANNOT be virtual.   Upon review of appts, pre op/PAC eval is scheduled for 3/25/24 with Ofe Read PA-C.    Designated  policy reviewed. Instructed to have someone stay 24  hours post procedure.       Medication review:    Medications reviewed. Please see supporting documentation below. Holding recommendations discussed (if applicable).   Blood thinner/Anti-platelet medication(s):  Apixaban (Eliquis): Recommended HOLD 2 days before procedure.  Consult with your managing provider.  Ferrous Sulfate (iron supplement): HOLD 7 days before procedure.  NSAID medication(s): Ibuprofen (Advil, Motrin): HOLD 1 day before procedure.      Prep for procedure:     Procedure prep instructions reviewed.        Any additional information needed:  N/A      Patient verbalized understanding and had no questions or concerns at this time.      Rocio Fung RN  Endoscopy Procedure Pre Assessment RN  [phone] option 4

## 2024-03-19 ENCOUNTER — TELEPHONE (OUTPATIENT)
Dept: SURGERY | Facility: CLINIC | Age: 59
End: 2024-03-19
Payer: COMMERCIAL

## 2024-03-25 ENCOUNTER — OFFICE VISIT (OUTPATIENT)
Dept: SURGERY | Facility: CLINIC | Age: 59
End: 2024-03-25
Payer: COMMERCIAL

## 2024-03-25 ENCOUNTER — PRE VISIT (OUTPATIENT)
Dept: SURGERY | Facility: CLINIC | Age: 59
End: 2024-03-25

## 2024-03-25 ENCOUNTER — MYC MEDICAL ADVICE (OUTPATIENT)
Dept: INTERVENTIONAL RADIOLOGY/VASCULAR | Facility: CLINIC | Age: 59
End: 2024-03-25

## 2024-03-25 ENCOUNTER — ANESTHESIA EVENT (OUTPATIENT)
Dept: GASTROENTEROLOGY | Facility: CLINIC | Age: 59
End: 2024-03-25
Payer: COMMERCIAL

## 2024-03-25 VITALS
HEART RATE: 59 BPM | RESPIRATION RATE: 16 BRPM | OXYGEN SATURATION: 100 % | SYSTOLIC BLOOD PRESSURE: 120 MMHG | HEIGHT: 71 IN | BODY MASS INDEX: 28.14 KG/M2 | WEIGHT: 201 LBS | DIASTOLIC BLOOD PRESSURE: 74 MMHG | TEMPERATURE: 97.8 F

## 2024-03-25 DIAGNOSIS — Z12.11 SPECIAL SCREENING FOR MALIGNANT NEOPLASMS, COLON: ICD-10-CM

## 2024-03-25 DIAGNOSIS — Z01.818 PREOP EXAMINATION: Primary | ICD-10-CM

## 2024-03-25 PROCEDURE — 99204 OFFICE O/P NEW MOD 45 MIN: CPT | Performed by: PHYSICIAN ASSISTANT

## 2024-03-25 RX ORDER — ROPINIROLE 0.5 MG/1
0.5 TABLET, FILM COATED ORAL 2 TIMES DAILY PRN
COMMUNITY

## 2024-03-25 ASSESSMENT — ENCOUNTER SYMPTOMS
DYSRHYTHMIAS: 1
SEIZURES: 1

## 2024-03-25 ASSESSMENT — LIFESTYLE VARIABLES: TOBACCO_USE: 0

## 2024-03-25 ASSESSMENT — PAIN SCALES - GENERAL: PAINLEVEL: MODERATE PAIN (4)

## 2024-03-25 NOTE — TELEPHONE ENCOUNTER
Incoming call from the patient's wife, Ofe. Consent to communicate on file.     Ofe calling for pre assessment for colonoscopy scheduled for tomorrow 3/26/24 and to confirm pre op exam scheduled for today 3/25/24.    Advised Ofe that the pre assessment call has already been completed with the patient.     Pre op physical exam scheduled in PAC today (3/25/24) with an arrival time of 2:45 PM. Reviewed arrival time and location of PAC with [facility]: [address] [facility], 5th Floor, [place] [address]. Informed [facility] that pre op physical is required prior to procedure so make sure patient attends this appt.     Also reviewed procedure date, arrival time and location with [facility]. 3/26/24 12:30 PM at [facility]; [address] [place] [address]. Check in location: 1st Floor [facility].     Ofe asked if patient can have clear Ensure while on a clear liquid diet. Advised Ofe this is OK.     Ofe reports the patient has already stopped taking Eliquis (apixaban).      Ofe (patient's wife) verbalized understanding and had no further questions.    Rocio Fung RN  Endoscopy Procedure Pre-Assessment RN  [phone], option 4

## 2024-03-25 NOTE — H&P
Pre-Operative H & P     CC:  Preoperative exam to assess for increased cardiopulmonary risk while undergoing surgery and anesthesia.    Date of Encounter: 3/25/2024  Primary Care Physician:  Ki Carter     Reason for visit:   Encounter Diagnoses   Name Primary?    Special screening for malignant neoplasms, colon Yes    Preop examination        HPI  Blanco Osborne is a 58 year old male who presents for pre-operative H & P in preparation for  Procedure Information       Case: [id number] Date/Time: 03/26/24 1330    Procedure: Colonoscopy (Rectum)    Anesthesia type: MAC    Diagnosis: Special screening for malignant neoplasms, colon [Z12.11]    Pre-op diagnosis: Special screening for malignant neoplasms, colon [Z12.11]    Location:  GI  01 /  GI    Providers: Jie Douglas MD            Patient is being evaluated for comorbid conditions of ETOH cirrhosis s/p liver transplant 2016, A-fib, chronic anticoagulation, HLD, CHRISTIAN, h/o CVA, seizures, anemia of chronic disease, thrombocytopenia, chronic immunosuppression.     Mr. Osborne has a history of ESRD on dialysis. He now presents for the above procedure as part of his work up for a kidney transplant.    History is obtained from the patient and chart review. He is accompanied by his wife.    Hx of abnormal bleeding or anti-platelet use: on Eliquis      Past Medical History  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    Cancer (H) 2023    Squamous Cell Cancer- Since resolved    Cirrhosis of liver with ascites (H) 02/11/2016    Coagulopathy (H24)     Critical illness myopathy     Dialysis patient (H24)     DIALYSIS 3X/ WEEK    Embolic stroke (H) 11/20/2022    Encephalopathy     ESRD (end stage renal  disease) on dialysis (H)     Gastroesophageal reflux disease     H/O alcohol abuse     History of blood transfusion     Hyperammonemia (H24)     Hyperlipidemia     Hypertension     Patients states that HTN has been resolved    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    Insomnia     Lactic acidosis 11/12/2022    Liver replaced by transplant (H) 09/20/2016    NAFLD (nonalcoholic fatty liver disease) 02/11/2016    CHRISTIAN on CPAP     Parainfluenza type 1 infection 11/19/2022    Parapneumonic effusion     Pleural effusion     Pneumothorax on left 12/16/2022    Respiratory acidosis 11/12/2022    Respiratory arrest (H) 11/12/2022    Restless legs syndrome (RLS)     SBP (spontaneous bacterial peritonitis) (H)     MNGI    Seizures (H) 12/2022    Sepsis due to Streptococcus pneumoniae with acute hypoxic respiratory failure (H) 11/19/2022    Stroke, embolic (H) 11/20/2022    Sudden cardiac arrest (H) 12/29/2022    PEA- 2 min of CPR    Tubular adenoma 10/2019    Large cecal adenoma- due for surveillance colonoscopy in 3 years (10/2022)       Past Surgical History  Past Surgical History:   Procedure Laterality Date    APPENDECTOMY      BENCH LIVER N/A 09/20/2016    Procedure: BENCH LIVER;  Surgeon: Enoc Crews MD;  Location:  OR    BRONCHOSCOPY FLEXIBLE AND RIGID N/A 12/20/2022    Procedure: BRONCHOSCOPY;  Surgeon: Alena Valenzuela MD;  Location:  GI    COLONOSCOPY  08/06/2013    repeat in 2018    COLONOSCOPY N/A 10/04/2019    Procedure: COLONOSCOPY, WITH POLYPECTOMY AND BIOPSY;  Surgeon: Go Chong MD;  Location:  OR    CREATE FISTULA ARTERIOVENOUS UPPER EXTREMITY Left 03/21/2023    Procedure: LEFT UPPER EXTREMITY ARTERIOVENOUS FISTULA CREATION. LIGATION OF COMPETING VASCULAR BRANCHES- LEFT;  Surgeon: Deejay Mcneil MD;  Location:  OR    CV CORONARY ANGIOGRAM N/A 2/22/2024    Procedure: Coronary Angiogram;  Surgeon: Nima Dimas MD;  Location:  [facility]ATH [facility]     CV PERICARDIOCENTESIS N/A 2023    Procedure: Pericardiocentesis;  Surgeon: Barry Mckeon MD;  Location:  [facility]ATH [facility]    CV PERICARDIOCENTESIS N/A 10/11/2023    Procedure: Pericardiocentesis;  Surgeon: Ulises Bhakta MD;  Location:  [facility]ATH [facility]    CV RIGHT HEART CATH MEASUREMENTS RECORDED N/A 10/11/2023    Procedure: Right Heart Catheterization;  Surgeon: Ulises Bhakta MD;  Location:  HE[facility] CARD[facility] CATH LAB    HERNIA REPAIR      IR CHEST TUBE PLACEMENT NON-TUNNELED RIGHT  2022    IR CVC TUNNEL PLACEMENT > 5 YRS OF AGE  2022    IR CVC TUNNEL PLACEMENT > 5 YRS OF AGE  1/3/2024    IR DIALYSIS FISTULOGRAM LEFT  2023    IR GASTROSTOMY TUBE CHANGE  2023    IR GASTROSTOMY TUBE PERCUTANEOUS PLCMNT  2022    IR PARACENTESIS  2022    IR PARACENTESIS  2022    IR PARACENTESIS  2/10/2016    IR PARACENTESIS  2016    IR THORACENTESIS  2022    IR THORACENTESIS  2020    IR THORACENTESIS  08/10/2020    IR TRANSCATHETER BIOPSY  2022    REVISION FISTULA ARTERIOVENOUS UPPER EXTREMITY Left 8/15/2023    Procedure: 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 2; OUTFLOW REVISION CEPHALIC TO BRACHIAL VEIN;  Surgeon: Deejay Mcneil MD;  Location:  OR    REVISION FISTULA ARTERIOVENOUS UPPER EXTREMITY Left 1/3/2024    Procedure: Excision and repair of LEFT brachiocephalic arteriovenous fistula and vein patch angioplasty;  Surgeon: Deejay Mcneil MD;  Location:  OR    TRACHEOSTOMY N/A 2022    Procedure: TRACHEOSTOMY;  Surgeon: Nilesh Jackson MD;  Location:  OR    TRANSPLANT LIVER RECIPIENT  DONOR N/A 2016    Procedure: TRANSPLANT LIVER RECIPIENT  DONOR;  Surgeon: Enoc Crews MD;  Location: [facility] OR       Prior to Admission Medications  Current Outpatient Medications   Medication Sig Dispense Refill    acetaminophen (TYLENOL) 325 MG tablet Take 2 tablets  (650 mg) by mouth every 8 hours as needed for other (For optimal non-opioid multimodal pain management to improve pain control.)      ciprofloxacin (CIPRO) 250 MG tablet Take 1 tablet (250 mg) by mouth daily 90 tablet 0    ELIQUIS ANTICOAGULANT 5 MG tablet Take 1 tablet (5 mg) by mouth 2 times daily 180 tablet 1    gabapentin (NEURONTIN) 100 MG capsule Take 3 capsules (300 mg) by mouth at bedtime (Patient taking differently: Take 300 mg by mouth as needed) 30 capsule 2    hydrOXYzine (ATARAX) 25 MG tablet Take 1 tablet (25 mg) by mouth 3 times daily as needed for itching 90 tablet 0    Lacosamide (VIMPAT) 100 MG TABS tablet Take 1 tablet (100 mg) by mouth every evening 31 tablet 5    melatonin 3 MG tablet Take 1 tablet (3 mg) by mouth At Bedtime (Patient taking differently: Take 3 mg by mouth nightly as needed) 90 tablet 1    midodrine (PROAMATINE) 10 MG tablet TAKE 1 TABLET 3 X DAILY. ON DIALYSIS DAYS(M, W, F) TAKE 2 EXTRA TABLETS 1 HOUR BEFORE, 1 TABLET WHEN YOU ARRIVE, AND 1 TABLET DURING DIALYSIS 450 tablet 1    multivitamin RENAL (TRIPHROCAPS) 1 capsule capsule Take 1 capsule by mouth daily 30 capsule 3    oxyCODONE (ROXICODONE) 5 MG tablet Take 0.5-1 tablets (2.5-5 mg) by mouth every 6 hours as needed for severe pain 15 tablet 0    rOPINIRole (REQUIP) 0.5 MG tablet Take 0.5 mg by mouth as needed      sevelamer carbonate (RENVELA) 800 MG tablet Take 1 tablet (800 mg) by mouth 4 times daily With meals and snacks (Patient taking differently: Take 800 mg by mouth as needed With meals and snacks) 120 tablet 0    tacrolimus (GENERIC) 0.5 MG capsule Take 1 capsule (0.5 mg) by mouth 2 times daily 180 capsule 0    traZODone (DESYREL) 50 MG tablet Take 1 tablet (50 mg) by mouth at bedtime (Patient taking differently: Take 50 mg by mouth nightly as needed) 90 tablet 1    bisacodyl (DULCOLAX) 5 MG EC tablet Take 2 tablets at 3 pm the day before your procedure. If your procedure is before 11 am, take 2 additional tablets  at 11 pm. If your procedure is after 11 am, take 2 additional tablets at 6 am. For additional instructions refer to your colonoscopy prep instructions. (Patient not taking: Reported on 3/25/2024) 4 tablet 0    lacosamide (VIMPAT) 50 MG TABS tablet Take 1 tablet (50 mg) by mouth every morning (Patient not taking: Reported on 3/25/2024) 93 tablet 3    polyethylene glycol (GOLYTELY) 236 g suspension The night before the exam at 6 pm drink an 8-ounce glass every 15 minutes until the jug is half empty. If you arrive before 11 AM: Drink the other half of the Golytely jug at 11 PM night before procedure. If you arrive after 11 AM: Drink the other half of the Golytely jug at 6 AM day of procedure. For additional instructions refer to your colonoscopy prep instructions. (Patient not taking: Reported on 3/25/2024) 4000 mL 0       Allergies  No Known Allergies    Social History  Social History     Socioeconomic History    Marital status:      Spouse name: Not on file    Number of children: Not on file    Years of education: Not on file    Highest education level: Not on file   Occupational History    Not on file   Tobacco Use    Smoking status: Never    Smokeless tobacco: Never   Vaping Use    Vaping Use: Never used   Substance and Sexual Activity    Alcohol use: Not Currently     Alcohol/week: 0.0 standard drinks of alcohol    Drug use: No    Sexual activity: Not Currently   Other Topics Concern    Parent/sibling w/ CABG, MI or angioplasty before 65F 55M? Not Asked   Social History Narrative    Not on file     Social Determinants of Health     Financial Resource Strain: Low Risk  (1/16/2024)    Financial Resource Strain     Within the past 12 months, have you or your family members you live with been unable to get utilities (heat, electricity) when it was really needed?: No   Food Insecurity: Low Risk  (1/16/2024)    Food Insecurity     Within the past 12 months, did you worry that your food would run out before you  got money to buy more?: No     Within the past 12 months, did the food you bought just not last and you didn t have money to get more?: No   Transportation Needs: Low Risk  (1/16/2024)    Transportation Needs     Within the past 12 months, has lack of transportation kept you from medical appointments, getting your medicines, non-medical meetings or appointments, work, or from getting things that you need?: No   Physical Activity: Not on file   Stress: Not on file   Social Connections: Not on file   Interpersonal Safety: Low Risk  (9/21/2023)    Interpersonal Safety     Do you feel physically and emotionally safe where you currently live?: Yes     Within the past 12 months, have you been hit, slapped, kicked or otherwise physically hurt by someone?: No     Within the past 12 months, have you been humiliated or emotionally abused in other ways by your partner or ex-partner?: No   Housing Stability: Low Risk  (1/16/2024)    Housing Stability     Do you have housing? : Yes     Are you worried about losing your housing?: No       Family History  Family History   Problem Relation Age of Onset    Coronary Artery Disease No family hx of     Cardiomyopathy No family hx of     Anesthesia Reaction No family hx of     Deep Vein Thrombosis (DVT) No family hx of        Review of Systems  The complete review of systems is negative other than noted in the HPI or here.     Anesthesia Evaluation   Pt has had prior anesthetic.     No history of anesthetic complications       ROS/MED HX  ENT/Pulmonary:     (+) sleep apnea (Dx per chart review. Pt unsure of diagnosis),                                    (-) tobacco use, asthma and recent URI   Neurologic: Comment: RLS      (+)       seizures, last seizure: single sz in 2022 while intubated in ICU,  CVA (per imaging results. Denies deficits.),                      Cardiovascular:     (+) Dyslipidemia - -   -  - -   Taking blood thinners  Instructions Given to patient: on Eliquis.          "          dysrhythmias, a-fib,        Previous cardiac testing   Echo: Date: 1/11/24 Results:  See H&P    Stress Test:  Date: Results:    ECG Reviewed:  Date: Results:    Cath:  Date: 2/22/24 Results:  Mid LAD lesion is 20% stenosed.  There are no hemodynamically significant coronary artery stenoses.      METS/Exercise Tolerance: 4 - Raking leaves, gardening Comment: Recently went golfing in [place]. Decreased activity on dialysis days.     Hematologic: Comments: Thrombocytopenia, platelet baseline       (+)      anemia (baseline Hgb 7-9), history of blood transfusion, no previous transfusion reaction,     (-) history of blood clots   Musculoskeletal:  - neg musculoskeletal ROS     GI/Hepatic: Comment: ETOH cirrhosis s/p liver transplant 2016    (+) GERD (occasional),            liver disease,       Renal/Genitourinary: Comment: Dialysis MWF via chest port, or occasionally via LUE fistula.      (+) renal disease, type: ESRD, Pt requires dialysis, type: Hemodialysis,          Endo:  - neg endo ROS  (-) Type II DM   Psychiatric/Substance Use:  - neg psychiatric ROS     Infectious Disease:  - neg infectious disease ROS     Malignancy:   (+) Malignancy, History of Skin.Skin CA status post Surgery.      Other: Comment: Immunosuppressed w/ tacrolimus             /74 (BP Location: Right arm, Patient Position: Sitting, Cuff Size: Adult Regular)   Pulse 59   Temp 97.8  F (36.6  C) (Oral)   Resp 16   Ht 1.803 m (5' 11\")   Wt 91.2 kg (201 lb)   SpO2 100%   BMI 28.03 kg/m      Physical Exam  Constitutional: Awake, alert, cooperative, no apparent distress, and appears stated age. Appears fatigued.  Eyes: Pupils equal, round and reactive to light, extra ocular muscles intact, sclera clear, conjunctiva normal.  HENT: Normocephalic, oral pharynx with moist mucus membranes, good dentition. No goiter appreciated. No removable dental hardware.  Respiratory: Clear to auscultation bilaterally, no crackles or wheezing. " Cardiovascular: Regular rate and rhythm, normal S1 and S2, and no murmur noted.  Carotids +2, no bruits. No edema. Palpable pulses to radial & DP arteries. Chest port intact. LUE AV fistula.   GI: Normal bowel sounds, soft, mildly distended, non-tender, no masses palpated.    Lymph/Hematologic: No cervical lymphadenopathy and no supraclavicular lymphadenopathy.  Genitourinary:  deferred  Skin: Warm and dry.  No rashes.   Musculoskeletal: Limited extension ROM of neck. There is no redness, warmth, or swelling of the joints. Gross motor strength is normal.    Neurologic: Awake, alert, oriented to name, place and time. Cranial nerves II-XII are grossly intact. Gait is normal. Ambulates from chair to exam table, seats self w/o assistance.  Neuropsychiatric: Calm, cooperative. Normal affect. Pleasant. Answers questions appropriately, follows commands w/o difficulty.        PRIOR LABS/DIAGNOSTIC STUDIES:    All labs and imaging personally reviewed      Heart cath -  please see in ROS above     Echo result w/o MOPS: Interpretation SummaryGlobal and regional left ventricular function is normal with an EF of 60-65%.Global right ventricular function is normal.Mild mitral annular calcification is present. Mild aortic valve calcificationand insufficiency present.Estimated mean right atrial pressure is normal.No pericardial effusion is present.      The patient's records and results personally reviewed by this provider.       Assessment    Blanco Osborne is a 58 year old male seen as a PAC referral for risk assessment and optimization for anesthesia.    Plan/Recommendations  Pt will be optimized for the proposed procedure.  See below for details on the assessment, risk, and preoperative recommendations    NEUROLOGY  - No history of TIA or CVA   - Hx single sz while intubated in ICU 12/2022. Continue lacosamide.    -Post Op delirium risk factors:  High co-morbid index    ENT  - No current airway concerns.  Will need to be  "reassessed day of surgery.  Mallampati: III  TM: > 3    CARDIAC  - No history of CAD, Hypertension, and Afib  - A-fib, chronically anticoagulated on apixaban.  Currently in sinus rhythm.  Appropriately anticoagulated with apixaban.  Not a candidate for AV slowing drugs due to resting hypotension.   - Cath 2/22/24:  Mid LAD lesion is 20% stenosed. There are no hemodynamically significant coronary artery stenoses.  - Echo 1/11/24: EF 60-65%. Normal function.     - METS (Metabolic Equivalents)  Recently went golfing in [place]. Decreased activity on dialysis days.  Patient performs 4 or more METS exercise without symptoms            Total Score: 0      RCRI-Low risk: Class 2 0.9% complication rate            Total Score: 1    RCRI: Elevated Creatinine        PULMONARY  - Diagnosis CHRISTIAN per chart review. Pt questions diagnosis. Currently being worked up by sleep medicine.  CHRISTIAN Low Risk            Total Score: 2    CHRISTIAN: Over 50 ys old    CHRISTIAN: Male      - Denies asthma or inhaler use  - Tobacco History    History   Smoking Status    Never   Smokeless Tobacco    Never       GI  - Occasional GERD  - ETOH cirrhosis, s/p liver transplant 2016  PONV Medium Risk  Total Score: 2           1 AN PONV: Patient is not a current smoker    1 AN PONV: Intended Post Op Opioids        /RENAL  - ESRD, on dialysis MWF via chest port or LUE AV fistula.  - Undergoing workup for kidney transplant    ENDOCRINE    - BMI: Estimated body mass index is 28.03 kg/m  as calculated from the following:    Height as of this encounter: 1.803 m (5' 11\").    Weight as of this encounter: 91.2 kg (201 lb).  Healthy Weight (BMI 18.5-24.9)  - No history of Diabetes Mellitus    HEME  VTE Low Risk 0.5%            Total Score: 2    VTE: Male      - Chronic anemia, baseline Hgb 7-9  - Thrombocytopenia, baseline platelets   - Continue tacrolimus  - On eliquis, last dose was 3/22 evening      The patient is aware that the final anesthesia plan will be decided by " the assigned anesthesia provider on the date of service.      The patient is optimized for their procedure. AVS with information on meds given by nursing staff. No further diagnostic testing indicated.      On the day of service:     Prep time: 16 minutes  Visit time: 22 minutes  Documentation time: 14 minutes  ------------------------------------------  Total time: 52 minutes      Ofe Read PA-C  Preoperative Assessment Center  [facility]  Clinic and Surgery Center  Phone: [phone]  Fax: [phone]

## 2024-03-25 NOTE — PATIENT INSTRUCTIONS
Name:  Blanco Osborne   MRN:  [id number]   :  1965   Today's Date:  3/25/2024         You were seen today for a pre-operative assessment in the:    Pre-operative Anesthesia Assessment Center(PAC)  [facility]  [address] [place] [id number]  phone [phone]      You will be receiving a call with location, date, arrival time and diet instructions from Preadmission Nursing at your surgical site:    -[facility]: [phone]   -[facility]: [phone]  -[facility]: [phone]  -[facility]: [phone]  -[facility]: [phone]  -[facility]: [phone]  -[facility]: [phone]        Anesthesia recommendations for medications:    Hold Aspirin for 7 days before procedure.  **Hold Multivitamins for 7 days before procedure.   Hold Herbal medications and Supplements for 7 days before procedure.  Hold Ibuprofen for 1 day before procedure.   Hold Naproxen for 4 days before procedure.     No alcohol or cannabis products for 24 hours before your procedure       Special instructions for anticoagulation medications:  Hold Eliquis for 2 days  before procedure.        Please DO NOT take the following medications the day of procedure:    Hydroxyzine    Please take these medications the day of procedure:    Oxycodone if needed  Tylenol if needed  Vimpat  Requip  Cipro    **Can take evening medications as scheduled. (Gabapentin if needed, Trazadone if needed)      How do I prepare myself?  - Please take 2 showers (one the night prior to surgery and one the morning of surgery) using Scrubcare or Hibiclens soap.    Use this soap only from the neck to your toes.     Leave the soap on your skin for one minute--then rinse thoroughly.      You may use your own shampoo and conditioner. No other hair products.   - Please remove all jewelry and body piercings.  - No lotions, deodorants or fragrance.  - No makeup or  fingernail polish.   - Bring your ID and insurance card.    -If you have a Deep Brain Stimulator, a Spinal Cord Stimulator, or any implanted Neuro Device, you must bring the remote to your appointment                 For further questions regarding your surgery please call your surgeon's office.

## 2024-03-26 ENCOUNTER — ANESTHESIA (OUTPATIENT)
Dept: GASTROENTEROLOGY | Facility: CLINIC | Age: 59
End: 2024-03-26
Payer: COMMERCIAL

## 2024-03-26 ENCOUNTER — HOSPITAL ENCOUNTER (OUTPATIENT)
Facility: CLINIC | Age: 59
Discharge: HOME | End: 2024-03-26
Attending: COLON & RECTAL SURGERY | Admitting: COLON & RECTAL SURGERY
Payer: COMMERCIAL

## 2024-03-26 VITALS
OXYGEN SATURATION: 96 % | HEIGHT: 71 IN | WEIGHT: 200 LBS | SYSTOLIC BLOOD PRESSURE: 109 MMHG | HEART RATE: 54 BPM | DIASTOLIC BLOOD PRESSURE: 76 MMHG | RESPIRATION RATE: 19 BRPM | BODY MASS INDEX: 28 KG/M2

## 2024-03-26 LAB — COLONOSCOPY: NORMAL

## 2024-03-26 PROCEDURE — 88305 TISSUE EXAM BY PATHOLOGIST: CPT | Mod: TC | Performed by: COLON & RECTAL SURGERY

## 2024-03-26 PROCEDURE — 370N000017 HC ANESTHESIA TECHNICAL FEE, PER MIN: Performed by: COLON & RECTAL SURGERY

## 2024-03-26 PROCEDURE — 250N000011 HC RX IP 250 OP 636: Performed by: NURSE ANESTHETIST, CERTIFIED REGISTERED

## 2024-03-26 PROCEDURE — 45385 COLONOSCOPY W/LESION REMOVAL: CPT | Performed by: ANESTHESIOLOGY

## 2024-03-26 PROCEDURE — 45385 COLONOSCOPY W/LESION REMOVAL: CPT | Mod: PT | Performed by: COLON & RECTAL SURGERY

## 2024-03-26 PROCEDURE — 88305 TISSUE EXAM BY PATHOLOGIST: CPT | Mod: 26 | Performed by: PATHOLOGY

## 2024-03-26 PROCEDURE — 258N000003 HC RX IP 258 OP 636: Performed by: NURSE ANESTHETIST, CERTIFIED REGISTERED

## 2024-03-26 PROCEDURE — 45385 COLONOSCOPY W/LESION REMOVAL: CPT | Performed by: NURSE ANESTHETIST, CERTIFIED REGISTERED

## 2024-03-26 PROCEDURE — 999N000010 HC STATISTIC ANES STAT CODE-CRNA PER MINUTE: Performed by: COLON & RECTAL SURGERY

## 2024-03-26 PROCEDURE — 250N000009 HC RX 250: Performed by: NURSE ANESTHETIST, CERTIFIED REGISTERED

## 2024-03-26 RX ORDER — NALOXONE HYDROCHLORIDE 0.4 MG/ML
0.4 INJECTION, SOLUTION INTRAMUSCULAR; INTRAVENOUS; SUBCUTANEOUS
Status: CANCELLED | OUTPATIENT
Start: 2024-03-26

## 2024-03-26 RX ORDER — ONDANSETRON 2 MG/ML
4 INJECTION INTRAMUSCULAR; INTRAVENOUS EVERY 6 HOURS PRN
Status: CANCELLED | OUTPATIENT
Start: 2024-03-26

## 2024-03-26 RX ORDER — DEXMEDETOMIDINE HYDROCHLORIDE 4 UG/ML
INJECTION, SOLUTION INTRAVENOUS PRN
Status: DISCONTINUED | OUTPATIENT
Start: 2024-03-26 | End: 2024-03-26

## 2024-03-26 RX ORDER — NALOXONE HYDROCHLORIDE 0.4 MG/ML
0.2 INJECTION, SOLUTION INTRAMUSCULAR; INTRAVENOUS; SUBCUTANEOUS
Status: CANCELLED | OUTPATIENT
Start: 2024-03-26

## 2024-03-26 RX ORDER — PROPOFOL 10 MG/ML
INJECTION, EMULSION INTRAVENOUS CONTINUOUS PRN
Status: DISCONTINUED | OUTPATIENT
Start: 2024-03-26 | End: 2024-03-26

## 2024-03-26 RX ORDER — ONDANSETRON 2 MG/ML
4 INJECTION INTRAMUSCULAR; INTRAVENOUS
Status: CANCELLED | OUTPATIENT
Start: 2024-03-26

## 2024-03-26 RX ORDER — LIDOCAINE 40 MG/G
CREAM TOPICAL
Status: CANCELLED | OUTPATIENT
Start: 2024-03-26

## 2024-03-26 RX ORDER — SODIUM CHLORIDE, SODIUM LACTATE, POTASSIUM CHLORIDE, CALCIUM CHLORIDE 600; 310; 30; 20 MG/100ML; MG/100ML; MG/100ML; MG/100ML
INJECTION, SOLUTION INTRAVENOUS CONTINUOUS PRN
Status: DISCONTINUED | OUTPATIENT
Start: 2024-03-26 | End: 2024-03-26

## 2024-03-26 RX ORDER — ONDANSETRON 4 MG/1
4 TABLET, ORALLY DISINTEGRATING ORAL EVERY 6 HOURS PRN
Status: CANCELLED | OUTPATIENT
Start: 2024-03-26

## 2024-03-26 RX ORDER — PROCHLORPERAZINE MALEATE 10 MG
10 TABLET ORAL EVERY 6 HOURS PRN
Status: CANCELLED | OUTPATIENT
Start: 2024-03-26

## 2024-03-26 RX ORDER — FLUMAZENIL 0.1 MG/ML
0.2 INJECTION, SOLUTION INTRAVENOUS
Status: CANCELLED | OUTPATIENT
Start: 2024-03-26 | End: 2024-03-27

## 2024-03-26 RX ORDER — SODIUM CHLORIDE, SODIUM LACTATE, POTASSIUM CHLORIDE, CALCIUM CHLORIDE 600; 310; 30; 20 MG/100ML; MG/100ML; MG/100ML; MG/100ML
INJECTION, SOLUTION INTRAVENOUS CONTINUOUS
Status: CANCELLED | OUTPATIENT
Start: 2024-03-26

## 2024-03-26 RX ORDER — PROPOFOL 10 MG/ML
INJECTION, EMULSION INTRAVENOUS PRN
Status: DISCONTINUED | OUTPATIENT
Start: 2024-03-26 | End: 2024-03-26

## 2024-03-26 RX ADMIN — SODIUM CHLORIDE, POTASSIUM CHLORIDE, SODIUM LACTATE AND CALCIUM CHLORIDE: 600; 310; 30; 20 INJECTION, SOLUTION INTRAVENOUS at 13:21

## 2024-03-26 RX ADMIN — DEXMEDETOMIDINE HYDROCHLORIDE 12 MCG: 200 INJECTION INTRAVENOUS at 13:26

## 2024-03-26 RX ADMIN — PROPOFOL 200 MCG/KG/MIN: 10 INJECTION, EMULSION INTRAVENOUS at 13:26

## 2024-03-26 RX ADMIN — PHENYLEPHRINE HYDROCHLORIDE 100 MCG: 10 INJECTION INTRAVENOUS at 13:34

## 2024-03-26 RX ADMIN — PROPOFOL 20 MG: 10 INJECTION, EMULSION INTRAVENOUS at 13:26

## 2024-03-26 ASSESSMENT — ACTIVITIES OF DAILY LIVING (ADL)
ADLS_ACUITY_SCORE: 37

## 2024-03-26 ASSESSMENT — ENCOUNTER SYMPTOMS
DYSRHYTHMIAS: 1
SEIZURES: 1

## 2024-03-26 ASSESSMENT — LIFESTYLE VARIABLES: TOBACCO_USE: 0

## 2024-03-26 NOTE — ANESTHESIA CARE TRANSFER NOTE
Patient: Blanco Osborne    Procedure: Procedure(s):  COLONOSCOPY, FLEXIBLE, WITH LESION REMOVAL USING SNARE       Diagnosis: Special screening for malignant neoplasms, colon [Z12.11]  Diagnosis Additional Information: No value filed.    Anesthesia Type:   MAC     Note:    Oropharynx: oropharynx clear of all foreign objects and spontaneously breathing  Level of Consciousness: awake  Oxygen Supplementation: room air    Independent Airway: airway patency satisfactory and stable  Dentition: dentition unchanged  Vital Signs Stable: post-procedure vital signs reviewed and stable  Report to RN Given: handoff report given  Destination: endoscopy.  Comments: After procedure in [facility] under monitored anesthesia care (MAC), patient exhibited spontaneous respirations, patient breathing room air with oxygen saturation maintained greater than 98%, patient brought to [facility]  [facility] recovery bay for postprocedure recovery, SpO2, NiBP, and EKG monitors and alarms on and functioning, report on patient's clinical status given to recovery-trained endoscopy RN, RN questions answered.            Vitals:  Vitals Value Taken Time   BP     Temp     Pulse     Resp     SpO2         Electronically Signed By: STEFFANY Moore CRNA  March 26, 2024  1:50 PM

## 2024-03-26 NOTE — ANESTHESIA POSTPROCEDURE EVALUATION
Patient: Blanco Osborne    Procedure: Procedure(s):  COLONOSCOPY, FLEXIBLE, WITH LESION REMOVAL USING SNARE       Anesthesia Type:  MAC    Note:     Postop Pain Control: Uneventful            Sign Out: Well controlled pain   PONV: No   Neuro/Psych: Uneventful            Sign Out: Acceptable/Baseline neuro status   Airway/Respiratory: Uneventful            Sign Out: Acceptable/Baseline resp. status   CV/Hemodynamics: Uneventful            Sign Out: Acceptable CV status; No obvious hypovolemia; No obvious fluid overload   Other NRE: NONE   DID A NON-ROUTINE EVENT OCCUR? No           Last vitals:  Vitals Value Taken Time   BP 76/47 03/26/24 1350   Temp     Pulse 61 03/26/24 1350   Resp 51 03/26/24 1352   SpO2 99 % 03/26/24 1352   Vitals shown include unfiled device data.    Electronically Signed By: Venu Franklin MD  March 26, 2024  1:54 PM

## 2024-03-26 NOTE — ANESTHESIA PREPROCEDURE EVALUATION
Anesthesia Pre-Procedure Evaluation    Patient: Blanco Osborne   MRN: [id number] : 1965        Procedure : Procedure(s):  Colonoscopy          Past Medical History:   Diagnosis Date    Acquired immunocompromised state (H24) 2022    Acute renal failure, unspecified acute renal failure type (H24) 2022    Antiplatelet or antithrombotic long-term use     Arrhythmia     PEA    Ascites     Aspergillus pneumonia (H) 2022    Cancer (H)     Squamous Cell Cancer- Since resolved    Cirrhosis of liver with ascites (H) 2016    Coagulopathy (H24)     Critical illness myopathy     Dialysis patient (H24)     DIALYSIS 3X/ WEEK    Embolic stroke (H) 2022    Encephalopathy     ESRD (end stage renal disease) on dialysis (H)     Gastroesophageal reflux disease     H/O alcohol abuse     History of blood transfusion     Hyperammonemia (H24)     Hyperlipidemia     Hypertension     Patients states that HTN has been resolved    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    Insomnia     Lactic acidosis 2022    Liver replaced by transplant (H) 2016    NAFLD (nonalcoholic fatty liver disease) 2016    CHRISTIAN on CPAP     Parainfluenza type 1 infection 2022    Parapneumonic effusion     Pleural effusion     Pneumothorax on left 2022    Respiratory acidosis 2022    Respiratory arrest (H) 2022    Restless legs syndrome (RLS)     SBP (spontaneous bacterial peritonitis) (H)     MNGI    Seizures (H) 2022    Sepsis due to Streptococcus pneumoniae with acute hypoxic respiratory failure (H) 2022    Stroke, embolic (H) 2022    Sudden cardiac arrest (H) 2022    PEA- 2 min of CPR    Tubular adenoma 10/2019    Large cecal adenoma- due for surveillance colonoscopy in 3 years (10/2022)      Past Surgical History:   Procedure Laterality Date    APPENDECTOMY      BENCH LIVER N/A 2016    Procedure: BENCH LIVER;  Surgeon: Enoc Crews MD;   Location: [facility] OR    BRONCHOSCOPY FLEXIBLE AND RIGID N/A 12/20/2022    Procedure: BRONCHOSCOPY;  Surgeon: Alena Valenzuela MD;  Location:  GI    COLONOSCOPY  08/06/2013    repeat in 2018    COLONOSCOPY N/A 10/04/2019    Procedure: COLONOSCOPY, WITH POLYPECTOMY AND BIOPSY;  Surgeon: Go Chong MD;  [facility]: [facility]    CREATE FISTULA ARTERIOVENOUS UPPER EXTREMITY Left 03/21/2023    Procedure: LEFT UPPER EXTREMITY ARTERIOVENOUS FISTULA CREATION. LIGATION OF COMPETING VASCULAR BRANCHES- LEFT;  Surgeon: Deejay Mcneil MD;  Location:  OR    CV CORONARY ANGIOGRAM N/A 2/22/2024    Procedure: Coronary Angiogram;  Surgeon: Nima Dimas MD;  Location:  HEART CARDIAC CATH LAB    CV PERICARDIOCENTESIS N/A 9/29/2023    Procedure: Pericardiocentesis;  Surgeon: Barry Mckeon MD;  Location:  HEART CARDIAC CATH LAB    CV PERICARDIOCENTESIS N/A 10/11/2023    Procedure: Pericardiocentesis;  Surgeon: Ulises Bhakta MD;  Location:  HEART CARDIAC CATH LAB    CV RIGHT HEART CATH MEASUREMENTS RECORDED N/A 10/11/2023    Procedure: Right Heart Catheterization;  Surgeon: Ulises Bhakta MD;  Location:  HEART CARDIAC CATH LAB    HERNIA REPAIR      IR CHEST TUBE PLACEMENT NON-TUNNELED RIGHT  12/12/2022    IR CVC TUNNEL PLACEMENT > 5 YRS OF AGE  12/06/2022    IR CVC TUNNEL PLACEMENT > 5 YRS OF AGE  1/3/2024    IR DIALYSIS FISTULOGRAM LEFT  07/13/2023    IR GASTROSTOMY TUBE CHANGE  02/08/2023    IR GASTROSTOMY TUBE PERCUTANEOUS PLCMNT  11/29/2022    IR PARACENTESIS  11/29/2022    IR PARACENTESIS  12/01/2022    IR PARACENTESIS  2/10/2016    IR PARACENTESIS  2/28/2016    IR THORACENTESIS  12/06/2022    IR THORACENTESIS  09/01/2020    IR THORACENTESIS  08/10/2020    IR TRANSCATHETER BIOPSY  12/01/2022    REVISION FISTULA ARTERIOVENOUS UPPER EXTREMITY Left 8/15/2023    Procedure: 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 2; OUTFLOW REVISION CEPHALIC TO BRACHIAL VEIN;  Surgeon: Tarun  Deejay Mandel MD;  Location: [facility]    REVISION FISTULA ARTERIOVENOUS UPPER EXTREMITY Left 1/3/2024    Procedure: Excision and repair of LEFT brachiocephalic arteriovenous fistula and vein patch angioplasty;  Surgeon: Deejay Mcneil MD;  Location: [facility]    TRACHEOSTOMY N/A 2022    Procedure: TRACHEOSTOMY;  Surgeon: Nilesh Jackson MD;  Location: [facility]    TRANSPLANT LIVER RECIPIENT  DONOR N/A 2016    Procedure: TRANSPLANT LIVER RECIPIENT  DONOR;  Surgeon: Enoc Crews MD;  Location: [facility] OR      No Known Allergies   Social History     Tobacco Use    Smoking status: Never    Smokeless tobacco: Never   Substance Use Topics    Alcohol use: Not Currently     Alcohol/week: 0.0 standard drinks of alcohol      Wt Readings from Last 1 Encounters:   24 90.7 kg (200 lb)        Anesthesia Evaluation   Pt has had prior anesthetic.     No history of anesthetic complications       ROS/MED HX  ENT/Pulmonary:     (+) sleep apnea (Dx per chart review. Pt unsure of diagnosis),                                    (-) tobacco use, asthma and recent URI   Neurologic: Comment: RLS      (+)       seizures, last seizure: single sz in  while intubated in ICU,  CVA (per imaging results. Denies deficits.),                      Cardiovascular:     (+) Dyslipidemia hypertension- -   -  - -   Taking blood thinners  Instructions Given to patient: on Eliquis.                   dysrhythmias, a-fib,        Previous cardiac testing   Echo: Date: 24 Results:  See H&P    Stress Test:  Date: Results:    ECG Reviewed:  Date: Results:    Cath:  Date: 24 Results:  Mid LAD lesion is 20% stenosed.  There are no hemodynamically significant coronary artery stenoses.      METS/Exercise Tolerance: 4 - Raking leaves, gardening Comment: Recently went golfing in [place]. Decreased activity on dialysis days.     Hematologic: Comments: Thrombocytopenia, platelet baseline       (+)      anemia  (baseline Hgb 7-9), history of blood transfusion, no previous transfusion reaction,     (-) history of blood clots   Musculoskeletal:  - neg musculoskeletal ROS     GI/Hepatic: Comment: ETOH cirrhosis s/p liver transplant 2016    (+) GERD (occasional),            liver disease,       Renal/Genitourinary: Comment: Dialysis MWF via chest port, or occasionally via LUE fistula.      (+) renal disease, type: ESRD, Pt requires dialysis, type: Hemodialysis,          Endo:  - neg endo ROS  (-) Type II DM   Psychiatric/Substance Use:  - neg psychiatric ROS     Infectious Disease:  - neg infectious disease ROS     Malignancy:   (+) Malignancy, History of Skin.Skin CA status post Surgery.      Other: Comment: Immunosuppressed w/ tacrolimus             Physical Exam    Airway        Mallampati: III   TM distance: > 3 FB   Neck ROM: full   Mouth opening: > 3 cm    Respiratory Devices and Support         Dental       (+) Multiple visibly decayed, broken teeth      Cardiovascular   cardiovascular exam normal       Rhythm and rate: regular     Pulmonary   pulmonary exam normal        breath sounds clear to auscultation           OUTSIDE LABS:  CBC:   Lab Results   Component Value Date    WBC 2.9 (L) 02/22/2024    WBC 4.3 01/16/2024    HGB 8.9 (L) 02/22/2024    HGB 8.7 (L) 01/16/2024    HCT 27.7 (L) 02/22/2024    HCT 28.6 (L) 01/16/2024    PLT 96 (L) 02/22/2024     (L) 01/16/2024     BMP:   Lab Results   Component Value Date     02/22/2024     01/11/2024    POTASSIUM 4.0 02/22/2024    POTASSIUM 4.2 01/11/2024    CHLORIDE 95 (L) 02/22/2024    CHLORIDE 92 (L) 01/11/2024    CO2 28 02/22/2024    CO2 30 (H) 01/11/2024    BUN 49.2 (H) 02/22/2024    BUN 41.2 (H) 01/11/2024    CR 6.16 (H) 02/22/2024    CR 5.25 (H) 01/11/2024    GLC 97 02/22/2024     (H) 01/11/2024     COAGS:   Lab Results   Component Value Date    PTT 37 02/22/2024    INR 1.41 (H) 02/22/2024    FIBR 442 10/24/2023     POC:   Lab Results   Component  "Value Date     (H) 09/28/2016     HEPATIC:   Lab Results   Component Value Date    ALBUMIN 4.1 01/11/2024    PROTTOTAL 7.9 01/11/2024    ALT <5 01/11/2024    AST 28 01/11/2024    ALKPHOS 282 (H) 01/11/2024    BILITOTAL 1.0 01/11/2024    CHANDLER 37 10/30/2023     OTHER:   Lab Results   Component Value Date    PH 7.30 (L) 03/05/2023    LACT 0.4 10/11/2023    A1C 5.0 01/11/2024    KELLY 9.2 02/22/2024    PHOS 4.9 (H) 01/16/2024    MAG 2.1 10/05/2023    LIPASE 24 10/13/2023    AMYLASE 72 09/22/2016    TSH 4.89 (H) 03/25/2023    T4 0.92 03/25/2023    CRP 22.3 (H) 11/22/2022       Anesthesia Plan    ASA Status:  3    NPO Status:  NPO Appropriate    Anesthesia Type: MAC.     - Reason for MAC: straight local not clinically adequate, immobility needed              Consents    Anesthesia Plan(s) and associated risks, benefits, and realistic alternatives discussed. Questions answered and patient/representative(s) expressed understanding.     - Discussed:     - Discussed with:  Patient            Postoperative Care            Comments:               Luis Sheldon MD    I have reviewed the pertinent notes and labs in the chart from the past 30 days and (re)examined the patient.  Any updates or changes from those notes are reflected in this note.            # Drug Induced Coagulation Defect: home medication list includes an anticoagulant medication   # Overweight: Estimated body mass index is 27.89 kg/m  as calculated from the following:    Height as of this encounter: 1.803 m (5' 11\").    Weight as of this encounter: 90.7 kg (200 lb).      "

## 2024-03-27 ENCOUNTER — OFFICE VISIT (OUTPATIENT)
Dept: SLEEP MEDICINE | Facility: CLINIC | Age: 59
End: 2024-03-27
Payer: COMMERCIAL

## 2024-03-27 ENCOUNTER — OFFICE VISIT (OUTPATIENT)
Dept: TRANSPLANT | Facility: CLINIC | Age: 59
End: 2024-03-27
Attending: NURSE PRACTITIONER
Payer: COMMERCIAL

## 2024-03-27 VITALS
DIASTOLIC BLOOD PRESSURE: 79 MMHG | OXYGEN SATURATION: 97 % | BODY MASS INDEX: 27.55 KG/M2 | HEART RATE: 75 BPM | WEIGHT: 197.5 LBS | SYSTOLIC BLOOD PRESSURE: 137 MMHG

## 2024-03-27 DIAGNOSIS — I25.10 CORONARY ARTERY DISEASE INVOLVING NATIVE CORONARY ARTERY OF NATIVE HEART WITHOUT ANGINA PECTORIS: ICD-10-CM

## 2024-03-27 DIAGNOSIS — I48.0 PAROXYSMAL ATRIAL FIBRILLATION (H): Primary | ICD-10-CM

## 2024-03-27 DIAGNOSIS — Z76.82 ORGAN TRANSPLANT CANDIDATE: ICD-10-CM

## 2024-03-27 DIAGNOSIS — N18.6 ESRD (END STAGE RENAL DISEASE) (H): ICD-10-CM

## 2024-03-27 DIAGNOSIS — R06.83 SNORING: ICD-10-CM

## 2024-03-27 LAB
PATH REPORT.COMMENTS IMP SPEC: NORMAL
PATH REPORT.COMMENTS IMP SPEC: NORMAL
PATH REPORT.FINAL DX SPEC: NORMAL
PATH REPORT.GROSS SPEC: NORMAL
PATH REPORT.MICROSCOPIC SPEC OTHER STN: NORMAL
PATH REPORT.RELEVANT HX SPEC: NORMAL
PHOTO IMAGE: NORMAL

## 2024-03-27 PROCEDURE — G0463 HOSPITAL OUTPT CLINIC VISIT: HCPCS

## 2024-03-27 PROCEDURE — 99214 OFFICE O/P EST MOD 30 MIN: CPT | Mod: 25

## 2024-03-27 RX ORDER — ASPIRIN 81 MG/1
81 TABLET ORAL DAILY
Qty: 90 TABLET | Refills: 3 | Status: SHIPPED | OUTPATIENT
Start: 2024-03-27

## 2024-03-27 NOTE — PROGRESS NOTES
Pt is completing a home sleep test. Pt was instructed on how to put on the Noxturnal T3 device and associated equipment before going to bed and given the opportunity to practice putting it on before leaving the sleep center. Pt was reminded to bring the home sleep test kit back to the center tomorrow, at agreed upon time for download and reporting.   Neck circumference: 40 CM / 15 3/4 inches.  Susi Jamil CMA on 3/27/2024 at 10:59 AM

## 2024-03-27 NOTE — LETTER
3/27/2024         RE: Blanco Osborne  [address]  [facility] [address]        Dear Colleague,    Thank you for referring your patient, Blanco Osborne, to the [facility]. Please see a copy of my visit note below.          [facility]   Cardiology Service  History and Physical      Blanco Osborne MRN# [id number]   YOB: 1965 Age: 58 year old       HPI:   Blanco Osborne is a 58 year old year old male with a medical history significant for ESKD from acute renal failure post arrest (on HD since 1/2023), PEA arrest 2/2 hypoxic respiratory failure (2022), CARRILLO s/p liver transplant (2016), pAF, and SCC (6/2023). He presents today for cardiovascular risk assessment as part of pre-kidney transplant evaluation and follow up after his coronary angiogram.     He most recently underwent coronary angiogram on 2/22/24 with Dr. Dimas which revealed a 20% stenosed mLAD and otherwise normal coronary arteries. His most recent echocardiogram shows an EF of 60-65%, no WMA's, and mild aortic valve insufficiency.     He is a never/non-smoker. He does not currently use alcohol or drugs; has not had a drink in years. His activity level is mild. He has pulmonary rehab 2 x week for one hour. Uses recumbent bike, treadmill, and free weights during his sessions.  He denies symptoms of chest pain, dizziness, lightheadedness, palpitations, shortness of breath, orthopnea, PND, or edema. He is able to complete ADL's and use stairs without any of the aforementioned symptoms.     Cardiovascular Risk Factor Profile:  coronary artery disease, hypertension, male age >45    Current Cardiovascular Symptoms:  - None    ACC HF Stage:  Stage B    NYHA Class:  Class I    Functional Capacity:  Performs 4 METS exercise without symptoms (e.g., light housework, stairs, 4 mph walk, 7 mph bike, slow step dance)        Past Medical History:   Diagnosis Date      Acquired immunocompromised state (H24) 11/19/2022     Acute renal failure, unspecified acute renal failure type (H24) 11/12/2022     Antiplatelet or antithrombotic long-term use      Arrhythmia     PEA     Ascites      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     Cancer (H) 2023    Squamous Cell Cancer- Since resolved     Cirrhosis of liver with ascites (H) 02/11/2016     Coagulopathy (H24)      Critical illness myopathy      Dialysis patient (H24)     DIALYSIS 3X/ WEEK     Embolic stroke (H) 11/20/2022     Encephalopathy      ESRD (end stage renal disease) on dialysis (H)      Gastroesophageal reflux disease      H/O alcohol abuse      History of blood transfusion      Hyperammonemia (H24)      Hyperlipidemia      Hypertension     Patients states that HTN has been resolved     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     Insomnia      Lactic acidosis 11/12/2022     Liver replaced by transplant (H) 09/20/2016     NAFLD (nonalcoholic fatty liver disease) 02/11/2016     CHRISTIAN on CPAP      Parainfluenza type 1 infection 11/19/2022     Parapneumonic effusion      Pleural effusion      Pneumothorax on left 12/16/2022     Respiratory acidosis 11/12/2022     Respiratory arrest (H) 11/12/2022     Restless legs syndrome (RLS)      SBP (spontaneous bacterial peritonitis) (H)     MNGI     Seizures (H) 12/2022     Sepsis due to Streptococcus pneumoniae with acute hypoxic respiratory failure (H) 11/19/2022     Stroke, embolic (H) 11/20/2022     Sudden cardiac arrest (H) 12/29/2022    PEA- 2 min of CPR     Tubular adenoma 10/2019    Large cecal adenoma- due for surveillance colonoscopy in 3 years (10/2022)       Past Surgical History:   Procedure Laterality Date     APPENDECTOMY       BENCH LIVER N/A 09/20/2016    Procedure: BENCH LIVER;  Surgeon: Enoc Crews MD;  Location: [facility] OR     BRONCHOSCOPY FLEXIBLE AND RIGID N/A 12/20/2022    Procedure: BRONCHOSCOPY;  Surgeon: Alena Valenzuela MD;  Location:  GI      COLONOSCOPY  08/06/2013    repeat in 2018     COLONOSCOPY N/A 10/04/2019    Procedure: COLONOSCOPY, WITH POLYPECTOMY AND BIOPSY;  Surgeon: Go Chong MD;  [facility]     CREATE FISTULA ARTERIOVENOUS UPPER EXTREMITY Left 03/21/2023    Procedure: LEFT UPPER EXTREMITY ARTERIOVENOUS FISTULA CREATION. LIGATION OF COMPETING VASCULAR BRANCHES- LEFT;  Surgeon: Deejay Mcneil MD;  Location:  OR     CV CORONARY ANGIOGRAM N/A 2/22/2024    Procedure: Coronary Angiogram;  Surgeon: Nima Dimas MD;  Location:  HEART CARDIAC CATH LAB     CV PERICARDIOCENTESIS N/A 9/29/2023    Procedure: Pericardiocentesis;  Surgeon: Barry Mckeon MD;  Location:  HEART CARDIAC CATH LAB     CV PERICARDIOCENTESIS N/A 10/11/2023    Procedure: Pericardiocentesis;  Surgeon: Ulises Bhakta MD;  Location:  HEART CARDIAC CATH LAB     CV RIGHT HEART CATH MEASUREMENTS RECORDED N/A 10/11/2023    Procedure: Right Heart Catheterization;  Surgeon: Ulises Bhakta MD;  Location:  HEART CARDIAC CATH LAB     HERNIA REPAIR       IR CHEST TUBE PLACEMENT NON-TUNNELED RIGHT  12/12/2022     IR CVC TUNNEL PLACEMENT > 5 YRS OF AGE  12/06/2022     IR CVC TUNNEL PLACEMENT > 5 YRS OF AGE  1/3/2024     IR DIALYSIS FISTULOGRAM LEFT  07/13/2023     IR GASTROSTOMY TUBE CHANGE  02/08/2023     IR GASTROSTOMY TUBE PERCUTANEOUS PLCMNT  11/29/2022     IR PARACENTESIS  11/29/2022     IR PARACENTESIS  12/01/2022     IR PARACENTESIS  2/10/2016     IR PARACENTESIS  2/28/2016     IR THORACENTESIS  12/06/2022     IR THORACENTESIS  09/01/2020     IR THORACENTESIS  08/10/2020     IR TRANSCATHETER BIOPSY  12/01/2022     REVISION FISTULA ARTERIOVENOUS UPPER EXTREMITY Left 8/15/2023    Procedure: 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 2; OUTFLOW REVISION CEPHALIC TO BRACHIAL VEIN;  Surgeon: Deejay Mcneil MD;  Location: [facility] OR     REVISION FISTULA ARTERIOVENOUS UPPER EXTREMITY Left 1/3/2024    Procedure: Excision and  repair of LEFT brachiocephalic arteriovenous fistula and vein patch angioplasty;  Surgeon: Deejay Mcneil MD;  Location: [facility]     TRACHEOSTOMY N/A 2022    Procedure: TRACHEOSTOMY;  Surgeon: Nilesh Jackson MD;  Location: [facility] OR     TRANSPLANT LIVER RECIPIENT  DONOR N/A 2016    Procedure: TRANSPLANT LIVER RECIPIENT  DONOR;  Surgeon: Enoc Crews MD;  Location: [facility] OR       acetaminophen (TYLENOL) 325 MG tablet, Take 2 tablets (650 mg) by mouth every 8 hours as needed for other (For optimal non-opioid multimodal pain management to improve pain control.)  ciprofloxacin (CIPRO) 250 MG tablet, Take 1 tablet (250 mg) by mouth daily  ELIQUIS ANTICOAGULANT 5 MG tablet, Take 1 tablet (5 mg) by mouth 2 times daily  ferrous sulfate (FEROSUL) 325 (65 Fe) MG tablet, Take 1 tablet (325 mg) by mouth daily for 180 days  gabapentin (NEURONTIN) 100 MG capsule, Take 3 capsules (300 mg) by mouth at bedtime  hydrOXYzine (ATARAX) 25 MG tablet, Take 1 tablet (25 mg) by mouth 3 times daily as needed for itching  Lacosamide (VIMPAT) 100 MG TABS tablet, Take 1 tablet (100 mg) by mouth every evening  lacosamide (VIMPAT) 50 MG TABS tablet, Take 1 tablet (50 mg) by mouth every morning  melatonin 3 MG tablet, Take 1 tablet (3 mg) by mouth At Bedtime (Patient taking differently: Take 3 mg by mouth nightly as needed)  midodrine (PROAMATINE) 10 MG tablet, TAKE 1 TABLET 3 X DAILY. ON DIALYSIS DAYS(M, W, F) TAKE 2 EXTRA TABLETS 1 HOUR BEFORE, 1 TABLET WHEN YOU ARRIVE, AND 1 TABLET DURING DIALYSIS  multivitamin RENAL (TRIPHROCAPS) 1 capsule capsule, Take 1 capsule by mouth daily  oxyCODONE (ROXICODONE) 5 MG tablet, Take 0.5-1 tablets (2.5-5 mg) by mouth every 6 hours as needed for severe pain  sevelamer carbonate (RENVELA) 800 MG tablet, Take 800 mg by mouth 4 times daily With meals and snacks  tacrolimus (GENERIC) 0.5 MG capsule, Take 1 capsule (0.5 mg) by mouth 2 times daily  traZODone (DESYREL)  50 MG tablet, Take 1 tablet (50 mg) by mouth at bedtime    No current facility-administered medications on file prior to visit.      Family History   Problem Relation Age of Onset     Coronary Artery Disease No family hx of      Cardiomyopathy No family hx of        Social History     Tobacco Use     Smoking status: Never     Smokeless tobacco: Never   Substance Use Topics     Alcohol use: Not Currently     Alcohol/week: 0.0 standard drinks of alcohol       No Known Allergies      Review Of Systems:   CONSTITUTIONAL: No report of fevers or chills  RESPIRATORY: No cough, wheezing, SOB, or hemoptysis  CARDIOVASCULAR: see HPI  MUSCULO-SKELETAL: No joint pain or swelling, no muscle pain  NEURO: No paresthesias, syncope, pre-syncope, lightheadness, dizziness or vertigo  ENDOCRINE: No temperature intolerance, no skin/hair changes  PSYCHIATRIC: No change in mood, feeling down/anxious, no change in sleep or appetite  GI: No melena or hematochezia, no change in bowel habits  : On hemodialysis; not making urine   HEME: No easy bruising or bleeding, no history of anemia, no history of blood clots  SKIN: No rashes or sores, no unusual itching      Physical Examination:  Vitals: /79   Pulse 75   Wt 89.6 kg (197 lb 8 oz)   SpO2 97%   BMI 27.55 kg/m    BMI= Body mass index is 27.55 kg/m .    GENERAL APPEARANCE: healthy, alert and no distress  HEENT: no icterus, no xanthelasmas, normal pupil size and reaction, normal palate, mucosa moist  NECK: JVP not elevated , brisk carotid upstroke bilaterally  CHEST: lungs clear to auscultation without rales, rhonchi or wheezes, no use of accessory muscles, no retractions  CARDIOVASCULAR: regular rhythm, normal S1 and S2, no S3 or S4 and no murmur, click or rub.  ABDOMEN: soft, non tender, without hepatosplenomegaly, no masses palpable, bowel sounds normal  EXTREMITIES: warm, no LE edema, DP/PT pulses 2+ bilaterally, no clubbing or cyanosis. Right femoral site soft, flat,  non-tender.   NEURO: alert and oriented to person/place/time, normal speech, gait and affect  SKIN: no ecchymoses, no rashes      Laboratory:  Last Comprehensive Metabolic Panel:  Lab Results   Component Value Date     2024    POTASSIUM 4.0 2024    CHLORIDE 95 (L) 2024    CO2 28 2024    ANIONGAP 16 (H) 2024    GLC 97 2024    BUN 49.2 (H) 2024    CR 6.16 (H) 2024    GFRESTIMATED 10 (L) 2024    KELLY 9.2 2024       Last CBC:  CBC RESULTS:   Recent Labs   Lab Test 24  0916   WBC 2.9*   RBC 2.76*   HGB 8.9*   HCT 27.7*      MCH 32.2   MCHC 32.1   RDW 14.6   PLT 96*       24 EK/11/24 Echocardiogram:  Global and regional left ventricular function is normal with an EF of 60-65%.  Global right ventricular function is normal.  Mild mitral annular calcification is present. Mild aortic valve calcification  and insufficiency present.  Estimated mean right atrial pressure is normal.  No pericardial effusion is present.    24 Coronary Angiogram:     Mid LAD lesion is 20% stenosed.     There are no hemodynamically significant coronary artery stenoses.        Assessment and Plan:     # Non-Obstructive Coronary Artery Disease  # Hx of PEA Arrest  Most recent Lipids done 2/15/24 w/ LDL 33, total 82. Not currently on a statin; likely due to history of liver transplant. Angiogram performed 24 with only 20% stenosis of mLAD lesion and otherwise normal coronaries. His right femoral site is soft, flat, and non-tender. He is without cardiac symptoms.    - Start Aspirin 81mg daily for secondary prevention   - Blood pressure mildly elevated today (137/79) but he reports he received midodrine today for diaysis; 110's/60's is average home readings   - No additional cardiac testing needed prior to transplant  - Follow-up in 12-18 months with your cardiologist; sooner with any symptoms     # Paroxsymal Atrial Fibrillation  [id number] = 2  (+htn, +vasc). He is currently in NSR. Reports only episode of atrial fibrillation was during hospitalization for pericarditis. He is wishing to get off of the Eliquis. He denies any palpitations, lightheadedness, or dizziness.   - Continue Eliquis 5mg BID for now; OK to transition to Coumadin as needed for transplant  - Not on rate control; would recommend diltiazem or metoprolol if required   - 2 week Zio Patch; if no AF will discuss discontinuation of Eliquis    # Overweight (BMI 27)  - Educated patient on the importance of healthy diet and exercise in reducing risk for heart disease   - Encouraged 150 minutes/week of moderate intensity exercise      # Hx of CARRILLO  Still follows with hepatology.   - He will discuss low dose statin use with his hepatologist     RCRI Score:   - High risk surgery (>5% cardiac complication risk) = 1 point   - Serum Creatinine >2.0 mg/dl = 1 point        I have reviewed and updated the patient's Past Medical History, Social History, Family History and Medication List.       STEFFANY Thomas, CNP  [facility]      Review of prior external note(s) from - care everywhere, hepatology, cardiology, SOT  Review of the result(s) of each unique test - EKG, Echocardiogram, angiogram, Holter  Ordering of each unique test  Prescription drug management- medication reconciliation, start aspirin 81mg     35 minutes spent by me on the date of the encounter doing chart review, review of outside records, review of test results, patient visit, and documentation     STEFFANY Gibbs CNP on 3/27/2024 at 3:04 PM          Again, thank you for allowing me to participate in the care of your patient.        Sincerely,        STEFFANY Gibbs CNP

## 2024-03-28 ENCOUNTER — DOCUMENTATION ONLY (OUTPATIENT)
Dept: SLEEP MEDICINE | Facility: CLINIC | Age: 59
End: 2024-03-28
Payer: COMMERCIAL

## 2024-03-28 ENCOUNTER — HOSPITAL ENCOUNTER (OUTPATIENT)
Dept: CARDIAC REHAB | Facility: CLINIC | Age: 59
Discharge: HOME | End: 2024-03-28
Attending: STUDENT IN AN ORGANIZED HEALTH CARE EDUCATION/TRAINING PROGRAM
Payer: COMMERCIAL

## 2024-03-28 PROCEDURE — 93798 PHYS/QHP OP CAR RHAB W/ECG: CPT

## 2024-03-28 NOTE — NURSING NOTE
Pt returned HST device. It was downloaded and forwarded data to the clinical specialist for scoring.  Susi Jamil CMA on 3/28/2024 at 11:47 AM

## 2024-03-28 NOTE — PROGRESS NOTES
HST POST-STUDY QUESTIONNAIRE    What time did you go to bed?  11 pm  How long do you think it took to fall asleep?  ?  What time did you wake up to start the day?  2 am  Did you get up during the night at all?  ?  If you woke up, do you remember approximately what time(s)?   Did you have any difficulty with the equipment?  No  Did you us any type of treatment with this study?  None  Was the head of the bed elevated? No  Did you sleep in a recliner?  No  Did you stop using CPAP at least 3 days before this test?   Any other information you'd like us to know?

## 2024-04-01 ENCOUNTER — TELEPHONE (OUTPATIENT)
Dept: SLEEP MEDICINE | Facility: CLINIC | Age: 59
End: 2024-04-01
Payer: COMMERCIAL

## 2024-04-01 NOTE — TELEPHONE ENCOUNTER
"Called patient to let him know that his sleep study was a fail due to less then 3 hours of pulse ox signal. Patient is unsure if he wants to redo the study due to not being sleep well during the study, patient will speak with his\" GP\" in regards to if it is worth redoing the study or not. Patient will contact our office when he has made that decision. No future appointments made with our clinic at this time.  Susi Jamil CMA on 4/1/2024 at 11:21 AM    "

## 2024-04-02 ENCOUNTER — HOSPITAL ENCOUNTER (OUTPATIENT)
Dept: CARDIAC REHAB | Facility: CLINIC | Age: 59
Discharge: HOME | End: 2024-04-02
Attending: STUDENT IN AN ORGANIZED HEALTH CARE EDUCATION/TRAINING PROGRAM
Payer: COMMERCIAL

## 2024-04-02 PROCEDURE — 93798 PHYS/QHP OP CAR RHAB W/ECG: CPT | Performed by: REHABILITATION PRACTITIONER

## 2024-04-03 ENCOUNTER — OFFICE VISIT (OUTPATIENT)
Dept: FAMILY MEDICINE | Facility: CLINIC | Age: 59
End: 2024-04-03
Payer: COMMERCIAL

## 2024-04-03 VITALS
WEIGHT: 194.9 LBS | SYSTOLIC BLOOD PRESSURE: 127 MMHG | TEMPERATURE: 97.3 F | RESPIRATION RATE: 14 BRPM | HEIGHT: 72 IN | BODY MASS INDEX: 26.4 KG/M2 | OXYGEN SATURATION: 100 % | HEART RATE: 63 BPM | DIASTOLIC BLOOD PRESSURE: 79 MMHG

## 2024-04-03 DIAGNOSIS — Z94.4 LIVER TRANSPLANTED (H): ICD-10-CM

## 2024-04-03 DIAGNOSIS — I10 HYPERTENSION, UNSPECIFIED TYPE: ICD-10-CM

## 2024-04-03 DIAGNOSIS — G25.81 RESTLESS LEGS SYNDROME (RLS): ICD-10-CM

## 2024-04-03 DIAGNOSIS — G40.109 FOCAL EPILEPSY (H): ICD-10-CM

## 2024-04-03 DIAGNOSIS — N18.6 ESRD (END STAGE RENAL DISEASE) ON DIALYSIS (H): ICD-10-CM

## 2024-04-03 DIAGNOSIS — Z01.818 PRE-OP EXAM: Primary | ICD-10-CM

## 2024-04-03 DIAGNOSIS — Z99.2 ESRD (END STAGE RENAL DISEASE) ON DIALYSIS (H): ICD-10-CM

## 2024-04-03 LAB
BASOPHILS # BLD AUTO: 0.1 10E3/UL (ref 0–0.2)
BASOPHILS NFR BLD AUTO: 1 %
EOSINOPHIL # BLD AUTO: 0.3 10E3/UL (ref 0–0.7)
EOSINOPHIL NFR BLD AUTO: 7 %
ERYTHROCYTE [DISTWIDTH] IN BLOOD BY AUTOMATED COUNT: 14.6 % (ref 10–15)
HCT VFR BLD AUTO: 35.7 % (ref 40–53)
HGB BLD-MCNC: 11.6 G/DL (ref 13.3–17.7)
IMM GRANULOCYTES # BLD: 0 10E3/UL
IMM GRANULOCYTES NFR BLD: 0 %
LYMPHOCYTES # BLD AUTO: 1.4 10E3/UL (ref 0.8–5.3)
LYMPHOCYTES NFR BLD AUTO: 29 %
MCH RBC QN AUTO: 31.9 PG (ref 26.5–33)
MCHC RBC AUTO-ENTMCNC: 32.5 G/DL (ref 31.5–36.5)
MCV RBC AUTO: 98 FL (ref 78–100)
MONOCYTES # BLD AUTO: 0.5 10E3/UL (ref 0–1.3)
MONOCYTES NFR BLD AUTO: 11 %
NEUTROPHILS # BLD AUTO: 2.6 10E3/UL (ref 1.6–8.3)
NEUTROPHILS NFR BLD AUTO: 53 %
PLATELET # BLD AUTO: 102 10E3/UL (ref 150–450)
RBC # BLD AUTO: 3.64 10E6/UL (ref 4.4–5.9)
WBC # BLD AUTO: 4.8 10E3/UL (ref 4–11)

## 2024-04-03 PROCEDURE — 99214 OFFICE O/P EST MOD 30 MIN: CPT | Performed by: PHYSICIAN ASSISTANT

## 2024-04-03 PROCEDURE — 80048 BASIC METABOLIC PNL TOTAL CA: CPT | Performed by: PHYSICIAN ASSISTANT

## 2024-04-03 PROCEDURE — 36415 COLL VENOUS BLD VENIPUNCTURE: CPT | Performed by: PHYSICIAN ASSISTANT

## 2024-04-03 PROCEDURE — 85025 COMPLETE CBC W/AUTO DIFF WBC: CPT | Performed by: PHYSICIAN ASSISTANT

## 2024-04-03 ASSESSMENT — PAIN SCALES - GENERAL: PAINLEVEL: MODERATE PAIN (5)

## 2024-04-03 NOTE — PROGRESS NOTES
Preoperative Evaluation  [facility]  [address] [place], [address]  [facility] [address]  Phone: [phone]  Primary Provider: Ki Carter  Pre-op Performing Provider: KI CARTER  Apr 3, 2024       Blanco is a 58 year old, presenting for the following:  Pre-Op Exam    Surgical Information  Surgery/Procedure: IR Transcatheter Biopsy   Surgery Location:   [facility]  Surgeon: Mona Onofre MD   Surgery Date: 4/9/2024  Time of Surgery: 11:30 am   Where patient plans to recover: At home with family  Fax number for surgical facility: Note does not need to be faxed, will be available electronically in Epic.    Assessment & Plan     The proposed surgical procedure is considered INTERMEDIATE risk.    Pre-op exam  ESRD (end stage renal disease) on dialysis (H)  Hypertension, unspecified type  Liver transplanted (H)  Focal epilepsy (H)  Restless legs syndrome (RLS)    Cleared for surgery pending labs.  Check CBC and BMP today.  Hold aspirin and additional NSAIDs for 1 week.  Hold Eliquis 2 days prior to procedure.  Continue all other medications without modification.  No additional cardiac testing needed anticipation of upcoming procedure.  He is comfortable with this plan.    - CBC with platelets and differential  - Basic metabolic panel  (Ca, Cl, CO2, Creat, Gluc, K, Na, BUN)     - No identified additional risk factors other than previously addressed      Recommendation  APPROVAL GIVEN to proceed with proposed procedure pending review of diagnostic evaluation.      30 minutes spent by me on the date of the encounter doing chart review, review of test results, interpretation of tests, patient visit, and documentation     Subjective       HPI related to upcoming procedure:     Here today for preoperative clearance for upcoming IR transcatheter biopsy scheduled for 4/9/2024.  Overall he is feeling well.  No fever, chills, sweats.  No  chest pain, shortness of breath, leg swelling, or dyspnea on exertion.    Staying active.  Recently underwent EKG, echo, cardiac cath in February with cardiology.  These notes were reviewed.        4/3/2024    11:04 AM   Preop Questions   1. Have you ever had a heart attack or stroke? UNKNOWN   2. Have you ever had surgery on your heart or blood vessels, such as a stent placement, a coronary artery bypass, or surgery on an artery in your head, neck, heart, or legs? YES   3. Do you have chest pain with activity? No   4. Do you have a history of  heart failure? UNKNOWN   5. Do you currently have a cold, bronchitis or symptoms of other infection? No   6. Do you have a cough, shortness of breath, or wheezing? No   7. Do you or anyone in your family have previous history of blood clots? No   8. Do you or does anyone in your family have a serious bleeding problem such as prolonged bleeding following surgeries or cuts? No   9. Have you ever had problems with anemia or been told to take iron pills? UNKNOWN   10. Have you had any abnormal blood loss such as black, tarry or bloody stools? No   11. Have you ever had a blood transfusion? YES   11a. Have you ever had a transfusion reaction? No   12. Are you willing to have a blood transfusion if it is medically needed before, during, or after your surgery? Yes   13. Have you or any of your relatives ever had problems with anesthesia? No   14. Do you have sleep apnea, excessive snoring or daytime drowsiness? UNKNOWN   15. Do you have any artifical heart valves or other implanted medical devices like a pacemaker, defibrillator, or continuous glucose monitor? No   16. Do you have artificial joints? No   17. Are you allergic to latex? No     Health Care Directive  Patient does not have a Health Care Directive or Living Will:     Preoperative Review of    reviewed - no controlled substances reflected in medication list.        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     Priority: High    Acquired immunocompromised state (H24) 11/19/2022     Priority: High    Sepsis due to Streptococcus pneumoniae with acute hypoxic respiratory failure (H) 11/19/2022     Priority: High    Encounter for therapeutic drug monitoring 11/19/2022     Priority: High    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     Priority: High    Status post coronary angiogram 02/22/2024     Priority: Medium    Pre-transplant evaluation for ESRD (end stage renal disease) 02/22/2024     Priority: Medium    Nonrheumatic aortic (valve) insufficiency 02/22/2024     Priority: Medium    Uremia 01/01/2024     Priority: Medium    Malfunction of arteriovenous dialysis fistula, initial encounter (H24) 01/01/2024     Priority: Medium    Septic shock (H) 10/23/2023     Priority: Medium    Pulmonary infiltrates 10/05/2023     Priority: Medium    Wide-complex tachycardia 10/05/2023     Priority: Medium    Elevated troponin 10/05/2023     Priority: Medium    Atrial fibrillation with rapid ventricular response (H) 10/05/2023     Priority: Medium    Severe sepsis (H) 10/05/2023     Priority: Medium    Multiple subsegmental pulmonary emboli without acute cor pulmonale (H) 10/05/2023     Priority: Medium    Pericardial effusion 09/27/2023     Priority: Medium    Dialysis AV fistula malfunction, sequela 08/15/2023     Priority: Medium    Parapneumonic effusion 01/21/2023     Priority: Medium    Pleural effusion 01/21/2023     Priority: Medium    ESRD (end stage renal disease) on dialysis (H) 01/07/2023     Priority: Medium    Anemia of chronic disease due to ESRD, cirrhosis and hypersplenism 01/07/2023     Priority: Medium    Acute on chronic respiratory failure with hypoxia (H) 12/29/2022     Priority: Medium    History of sudden cardiac arrest, PEA 12/29/2022     Priority: Medium     2 min CPR      Hypotension, unspecified hypotension type 12/27/2022     Priority: Medium     Encephalopathy 12/23/2022     Priority: Medium    PEA (Pulseless electrical activity) (H) 12/23/2022     Priority: Medium    Seizures (H) 12/23/2022     Priority: Medium    Pneumothorax on left 12/16/2022     Priority: Medium    Critical illness myopathy 12/04/2022     Priority: Medium    Hyperammonemia (H24) 11/29/2022     Priority: Medium    Pneumothorax 11/29/2022     Priority: Medium    Embolic stroke (H) 11/20/2022     Priority: Medium    Respiratory arrest (H) 11/12/2022     Priority: Medium    Lactic acidosis 11/12/2022     Priority: Medium    Respiratory acidosis 11/12/2022     Priority: Medium    Acute renal failure, unspecified acute renal failure type (H24) 11/12/2022     Priority: Medium    Immunosuppression (H24) 09/28/2016     Priority: Medium    Liver transplanted (H) 09/21/2016     Priority: Medium    Gastroesophageal reflux disease with esophagitis 03/15/2016     Priority: Medium     Formatting of this note might be different from the original.  [facility], 3/16.  Varices banded 2016.      Cirrhosis of liver (H) 02/11/2016     Priority: Medium    NAFLD (nonalcoholic fatty liver disease) 02/11/2016     Priority: Medium    SBP (spontaneous bacterial peritonitis) (H) 11/11/2015     Priority: Medium     Lactobacillus      Coagulopathy (H24) 11/09/2015     Priority: Medium    Leukocytosis 11/09/2015     Priority: Medium    Other ascites 05/13/2015     Priority: Medium    Hyperlipidemia 05/13/2015     Priority: Medium    Hypertension 05/13/2015     Priority: Medium    Colon adenoma 08/21/2013     Priority: Medium     Formatting of this note might be different from the original.  8/13 1 adenoma, recheck 2019:      CHRISTIAN on CPAP 03/05/2012     Priority: Medium     Formatting of this note might be different from the original.  PSG 2/12: 142 AHI. 11cm H20      Dyslipidemia 02/07/2012     Priority: Medium    Abnormal liver function test 07/28/2011     Priority: Medium    Umbilical hernia 07/27/2011     Priority:  Medium      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    Cancer (H) 2023    Squamous Cell Cancer- Since resolved    Cirrhosis of liver with ascites (H) 02/11/2016    Coagulopathy (H24)     Critical illness myopathy     Dialysis patient (H24)     DIALYSIS 3X/ WEEK    Embolic stroke (H) 11/20/2022    Encephalopathy     ESRD (end stage renal disease) on dialysis (H)     Gastroesophageal reflux disease     H/O alcohol abuse     History of blood transfusion     Hyperammonemia (H24)     Hyperlipidemia     Hypertension     Patients states that HTN has been resolved    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    Insomnia     Lactic acidosis 11/12/2022    Liver replaced by transplant (H) 09/20/2016    NAFLD (nonalcoholic fatty liver disease) 02/11/2016    CHRISTIAN on CPAP     Parainfluenza type 1 infection 11/19/2022    Parapneumonic effusion     Pleural effusion     Pneumothorax on left 12/16/2022    Respiratory acidosis 11/12/2022    Respiratory arrest (H) 11/12/2022    Restless legs syndrome (RLS)     SBP (spontaneous bacterial peritonitis) (H)     [facility]    Seizures (H) 12/2022    Sepsis due to Streptococcus pneumoniae with acute hypoxic respiratory failure (H) 11/19/2022    Stroke, embolic (H) 11/20/2022    Sudden cardiac arrest (H) 12/29/2022    PEA- 2 min of CPR    Tubular adenoma 10/2019    Large cecal adenoma- due for surveillance colonoscopy in 3 years (10/2022)     Past Surgical History:   Procedure Laterality Date    APPENDECTOMY      BENCH LIVER N/A 09/20/2016    Procedure: BENCH LIVER;  Surgeon: Enoc Crews MD;  Location: [facility] OR    BRONCHOSCOPY FLEXIBLE AND RIGID N/A 12/20/2022    Procedure: BRONCHOSCOPY;  Surgeon: Alena Valenzuela MD;  Location:   GI    COLONOSCOPY  08/06/2013    repeat in 2018    COLONOSCOPY N/A 10/04/2019    Procedure: COLONOSCOPY, WITH POLYPECTOMY AND BIOPSY;  Surgeon: Go Chong MD;  Location: [facility] OR    COLONOSCOPY N/A 3/26/2024    Procedure: COLONOSCOPY, FLEXIBLE, WITH LESION REMOVAL USING SNARE;  Surgeon: Jie Douglas MD;  Location:  GI    CREATE FISTULA ARTERIOVENOUS UPPER EXTREMITY Left 03/21/2023    Procedure: LEFT UPPER EXTREMITY ARTERIOVENOUS FISTULA CREATION. LIGATION OF COMPETING VASCULAR BRANCHES- LEFT;  Surgeon: Deejay Mcneil MD;  Location:  OR    CV CORONARY ANGIOGRAM N/A 2/22/2024    Procedure: Coronary Angiogram;  Surgeon: Nima Dimas MD;  Location:  HEART CARDIAC CATH LAB    CV PERICARDIOCENTESIS N/A 9/29/2023    Procedure: Pericardiocentesis;  Surgeon: Barry Mckeon MD;  Location:  HEART CARDIAC CATH LAB    CV PERICARDIOCENTESIS N/A 10/11/2023    Procedure: Pericardiocentesis;  Surgeon: Ulises Bhakta MD;  Location:  HEART CARDIAC CATH LAB    CV RIGHT HEART CATH MEASUREMENTS RECORDED N/A 10/11/2023    Procedure: Right Heart Catheterization;  Surgeon: Ulises Bhakta MD;  Location:  HEART CARDIAC CATH LAB    HERNIA REPAIR      IR CHEST TUBE PLACEMENT NON-TUNNELED RIGHT  12/12/2022    IR CVC TUNNEL PLACEMENT > 5 YRS OF AGE  12/06/2022    IR CVC TUNNEL PLACEMENT > 5 YRS OF AGE  1/3/2024    IR DIALYSIS FISTULOGRAM LEFT  07/13/2023    IR GASTROSTOMY TUBE CHANGE  02/08/2023    IR GASTROSTOMY TUBE PERCUTANEOUS PLCMNT  11/29/2022    IR PARACENTESIS  11/29/2022    IR PARACENTESIS  12/01/2022    IR PARACENTESIS  2/10/2016    IR PARACENTESIS  2/28/2016    IR THORACENTESIS  12/06/2022    IR THORACENTESIS  09/01/2020    IR THORACENTESIS  08/10/2020    IR TRANSCATHETER BIOPSY  12/01/2022    REVISION FISTULA ARTERIOVENOUS UPPER EXTREMITY Left 8/15/2023    Procedure: 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 2; OUTFLOW REVISION CEPHALIC TO BRACHIAL VEIN;   Surgeon: Deejay Mcneil MD;  Location: [facility] OR    REVISION FISTULA ARTERIOVENOUS UPPER EXTREMITY Left 1/3/2024    Procedure: Excision and repair of LEFT brachiocephalic arteriovenous fistula and vein patch angioplasty;  Surgeon: Deejay Mcneil MD;  Location: [facility] OR    TRACHEOSTOMY N/A 2022    Procedure: TRACHEOSTOMY;  Surgeon: Nilesh Jackson MD;  Location: [facility] OR    TRANSPLANT LIVER RECIPIENT  DONOR N/A 2016    Procedure: TRANSPLANT LIVER RECIPIENT  DONOR;  Surgeon: Enoc Crews MD;  Location: [facility] OR     Current Outpatient Medications   Medication Sig Dispense Refill    acetaminophen (TYLENOL) 325 MG tablet Take 2 tablets (650 mg) by mouth every 8 hours as needed for other (For optimal non-opioid multimodal pain management to improve pain control.)      aspirin 81 MG EC tablet Take 1 tablet (81 mg) by mouth daily 90 tablet 3    bisacodyl (DULCOLAX) 5 MG EC tablet Take 2 tablets at 3 pm the day before your procedure. If your procedure is before 11 am, take 2 additional tablets at 11 pm. If your procedure is after 11 am, take 2 additional tablets at 6 am. For additional instructions refer to your colonoscopy prep instructions. 4 tablet 0    ciprofloxacin (CIPRO) 250 MG tablet Take 1 tablet (250 mg) by mouth daily 90 tablet 0    ELIQUIS ANTICOAGULANT 5 MG tablet Take 1 tablet (5 mg) by mouth 2 times daily 180 tablet 1    gabapentin (NEURONTIN) 100 MG capsule Take 3 capsules (300 mg) by mouth at bedtime (Patient taking differently: Take 300 mg by mouth as needed) 30 capsule 2    hydrOXYzine (ATARAX) 25 MG tablet Take 1 tablet (25 mg) by mouth 3 times daily as needed for itching 90 tablet 0    Lacosamide (VIMPAT) 100 MG TABS tablet Take 1 tablet (100 mg) by mouth every evening 31 tablet 5    lacosamide (VIMPAT) 50 MG TABS tablet Take 1 tablet (50 mg) by mouth every morning 93 tablet 3    melatonin 3 MG tablet Take 1 tablet (3 mg) by mouth At Bedtime (Patient  taking differently: Take 3 mg by mouth nightly as needed) 90 tablet 1    midodrine (PROAMATINE) 10 MG tablet TAKE 1 TABLET 3 X DAILY. ON DIALYSIS DAYS(M, W, F) TAKE 2 EXTRA TABLETS 1 HOUR BEFORE, 1 TABLET WHEN YOU ARRIVE, AND 1 TABLET DURING DIALYSIS 450 tablet 1    multivitamin RENAL (TRIPHROCAPS) 1 capsule capsule Take 1 capsule by mouth daily 30 capsule 3    oxyCODONE (ROXICODONE) 5 MG tablet Take 0.5-1 tablets (2.5-5 mg) by mouth every 6 hours as needed for severe pain 15 tablet 0    polyethylene glycol (GOLYTELY) 236 g suspension The night before the exam at 6 pm drink an 8-ounce glass every 15 minutes until the jug is half empty. If you arrive before 11 AM: Drink the other half of the Golytely jug at 11 PM night before procedure. If you arrive after 11 AM: Drink the other half of the Golytely jug at 6 AM day of procedure. For additional instructions refer to your colonoscopy prep instructions. 4000 mL 0    rOPINIRole (REQUIP) 0.5 MG tablet Take 0.5 mg by mouth as needed      sevelamer carbonate (RENVELA) 800 MG tablet Take 1 tablet (800 mg) by mouth 4 times daily With meals and snacks (Patient taking differently: Take 800 mg by mouth as needed With meals and snacks) 120 tablet 0    tacrolimus (GENERIC) 0.5 MG capsule Take 1 capsule (0.5 mg) by mouth 2 times daily 180 capsule 0    traZODone (DESYREL) 50 MG tablet Take 1 tablet (50 mg) by mouth at bedtime (Patient taking differently: Take 50 mg by mouth nightly as needed) 90 tablet 1       No Known Allergies     Social History     Tobacco Use    Smoking status: Never    Smokeless tobacco: Never   Substance Use Topics    Alcohol use: Not Currently     Alcohol/week: 0.0 standard drinks of alcohol     Family History   Problem Relation Age of Onset    Coronary Artery Disease No family hx of     Cardiomyopathy No family hx of     Anesthesia Reaction No family hx of     Deep Vein Thrombosis (DVT) No family hx of      History   Drug Use No         Review of  Systems    Review of Systems  Constitutional, neuro, ENT, endocrine, pulmonary, cardiac, gastrointestinal, genitourinary, musculoskeletal, integument and psychiatric systems are negative, except as otherwise noted.    Objective    /79 (BP Location: Right arm, Patient Position: Sitting, Cuff Size: Adult Regular)   Pulse 63   Temp 97.3  F (36.3  C) (Temporal)   Resp 14   Ht 1.829 m (6')   Wt 88.4 kg (194 lb 14.4 oz)   SpO2 100%   BMI 26.43 kg/m     Estimated body mass index is 26.43 kg/m  as calculated from the following:    Height as of this encounter: 1.829 m (6').    Weight as of this encounter: 88.4 kg (194 lb 14.4 oz).  Physical Exam  GENERAL: alert and no distress  EYES: Eyes grossly normal to inspection, PERRL and conjunctivae and sclerae normal  NECK: no adenopathy, no asymmetry, masses, or scars  RESP: lungs clear to auscultation - no rales, rhonchi or wheezes  CV: regular rate and rhythm, normal S1 S2, no S3 or S4, no murmur, click or rub, no peripheral edema  ABDOMEN: soft, nontender, no hepatosplenomegaly, no masses  MS: no gross musculoskeletal defects noted, no edema  SKIN: no suspicious lesions or rashes  NEURO: Normal strength and tone, mentation intact and speech normal  PSYCH: mentation appears normal, affect normal/bright    Recent Labs   Lab Test 02/22/24  0916 01/16/24  1253 01/11/24  1353 10/28/23  0726 10/27/23  0655 08/16/23  0734 08/10/23  1032   HGB 8.9* 8.7* 8.7*   < >  --    < > 9.1*   PLT 96* 125* 115*   < >  --    < > 82*   INR 1.41*  --   --   --  1.67*   < >  --      --  135   < >  --    < > 141   POTASSIUM 4.0  --  4.2   < >  --    < > 4.3   CR 6.16*  --  5.25*   < >  --    < > 5.76*   A1C  --   --  5.0  --   --   --  4.7    < > = values in this interval not displayed.        Diagnostics  Labs pending at this time.  Results will be reviewed when available.   No EKG this visit, completed in the last 90 days.    Revised Cardiac Risk Index (RCRI)  The patient has the  following serious cardiovascular risks for perioperative complications:   - Serum Creatinine >2.0 mg/dl = 1 point     RCRI Interpretation: 1 point: Class II (low risk - 0.9% complication rate)    The likelihood of other entities in the differential is insufficient to justify any further testing for them at this time. This was explained to the patient. The patient was advised that persistent or worsening symptoms would require further evaluation. Patient advised to call the office and if unable to reach to go to the emergency room if they develop any new or worsening symptoms. Expressed understanding and agreement with above stated plan.     Signed Electronically by: Ki Carter PA-C  Copy of this evaluation report is provided to requesting physician.

## 2024-04-03 NOTE — PATIENT INSTRUCTIONS
-Hold ASA and other NSAIDs (ibuprofen, aleve, advil, aspirin) starting now  -Hold Eliquis 2 days before

## 2024-04-04 ENCOUNTER — HOSPITAL ENCOUNTER (OUTPATIENT)
Dept: CARDIAC REHAB | Facility: CLINIC | Age: 59
Discharge: HOME | End: 2024-04-04
Attending: STUDENT IN AN ORGANIZED HEALTH CARE EDUCATION/TRAINING PROGRAM
Payer: COMMERCIAL

## 2024-04-04 LAB
ANION GAP SERPL CALCULATED.3IONS-SCNC: 13 MMOL/L (ref 7–15)
BUN SERPL-MCNC: 30 MG/DL (ref 8–23)
CALCIUM SERPL-MCNC: 9.3 MG/DL (ref 8.6–10)
CHLORIDE SERPL-SCNC: 93 MMOL/L (ref 98–107)
CREAT SERPL-MCNC: 4.47 MG/DL (ref 0.67–1.17)
DEPRECATED HCO3 PLAS-SCNC: 29 MMOL/L (ref 22–29)
EGFRCR SERPLBLD CKD-EPI 2021: 14 ML/MIN/1.73M2
GLUCOSE SERPL-MCNC: 102 MG/DL (ref 70–99)
POTASSIUM SERPL-SCNC: 3.7 MMOL/L (ref 3.4–5.3)
SODIUM SERPL-SCNC: 135 MMOL/L (ref 135–145)

## 2024-04-04 PROCEDURE — 93798 PHYS/QHP OP CAR RHAB W/ECG: CPT | Performed by: REHABILITATION PRACTITIONER

## 2024-04-09 ENCOUNTER — APPOINTMENT (OUTPATIENT)
Dept: INTERVENTIONAL RADIOLOGY/VASCULAR | Facility: CLINIC | Age: 59
End: 2024-04-09
Attending: SURGERY
Payer: COMMERCIAL

## 2024-04-09 ENCOUNTER — APPOINTMENT (OUTPATIENT)
Dept: MEDSURG UNIT | Facility: CLINIC | Age: 59
End: 2024-04-09
Attending: SURGERY
Payer: COMMERCIAL

## 2024-04-09 ENCOUNTER — HOSPITAL ENCOUNTER (OUTPATIENT)
Facility: CLINIC | Age: 59
Discharge: HOME | End: 2024-04-09
Attending: SURGERY | Admitting: STUDENT IN AN ORGANIZED HEALTH CARE EDUCATION/TRAINING PROGRAM
Payer: COMMERCIAL

## 2024-04-09 ENCOUNTER — PATIENT OUTREACH (OUTPATIENT)
Dept: GASTROENTEROLOGY | Facility: CLINIC | Age: 59
End: 2024-04-09
Payer: COMMERCIAL

## 2024-04-09 VITALS
HEART RATE: 64 BPM | WEIGHT: 197.9 LBS | SYSTOLIC BLOOD PRESSURE: 124 MMHG | DIASTOLIC BLOOD PRESSURE: 78 MMHG | RESPIRATION RATE: 16 BRPM | TEMPERATURE: 98.2 F | OXYGEN SATURATION: 99 % | BODY MASS INDEX: 26.84 KG/M2

## 2024-04-09 DIAGNOSIS — Z76.82 AWAITING ORGAN TRANSPLANT: ICD-10-CM

## 2024-04-09 LAB — INR PPP: 1.32 (ref 0.85–1.15)

## 2024-04-09 PROCEDURE — 250N000011 HC RX IP 250 OP 636: Performed by: STUDENT IN AN ORGANIZED HEALTH CARE EDUCATION/TRAINING PROGRAM

## 2024-04-09 PROCEDURE — 36011 PLACE CATHETER IN VEIN: CPT

## 2024-04-09 PROCEDURE — 76937 US GUIDE VASCULAR ACCESS: CPT | Mod: 26 | Performed by: STUDENT IN AN ORGANIZED HEALTH CARE EDUCATION/TRAINING PROGRAM

## 2024-04-09 PROCEDURE — 85610 PROTHROMBIN TIME: CPT | Performed by: NURSE PRACTITIONER

## 2024-04-09 PROCEDURE — 99152 MOD SED SAME PHYS/QHP 5/>YRS: CPT

## 2024-04-09 PROCEDURE — 75970 VASCULAR BIOPSY: CPT

## 2024-04-09 PROCEDURE — 258N000003 HC RX IP 258 OP 636: Performed by: NURSE PRACTITIONER

## 2024-04-09 PROCEDURE — 88313 SPECIAL STAINS GROUP 2: CPT | Mod: TC | Performed by: SURGERY

## 2024-04-09 PROCEDURE — 36011 PLACE CATHETER IN VEIN: CPT | Mod: GC | Performed by: STUDENT IN AN ORGANIZED HEALTH CARE EDUCATION/TRAINING PROGRAM

## 2024-04-09 PROCEDURE — 999N000142 HC STATISTIC PROCEDURE PREP ONLY

## 2024-04-09 PROCEDURE — 88313 SPECIAL STAINS GROUP 2: CPT | Mod: 26 | Performed by: PATHOLOGY

## 2024-04-09 PROCEDURE — 88307 TISSUE EXAM BY PATHOLOGIST: CPT | Mod: 26 | Performed by: PATHOLOGY

## 2024-04-09 PROCEDURE — 37200 TRANSCATHETER BIOPSY: CPT | Mod: GC | Performed by: STUDENT IN AN ORGANIZED HEALTH CARE EDUCATION/TRAINING PROGRAM

## 2024-04-09 PROCEDURE — 272N000570 HC SHEATH CR7

## 2024-04-09 PROCEDURE — C1769 GUIDE WIRE: HCPCS

## 2024-04-09 PROCEDURE — 272N000192 HC ACCESSORY CR2

## 2024-04-09 PROCEDURE — 272N000209 HC BALLOON (NON-PTA) CR6

## 2024-04-09 PROCEDURE — 250N000009 HC RX 250: Performed by: STUDENT IN AN ORGANIZED HEALTH CARE EDUCATION/TRAINING PROGRAM

## 2024-04-09 PROCEDURE — C1887 CATHETER, GUIDING: HCPCS

## 2024-04-09 PROCEDURE — 75970 VASCULAR BIOPSY: CPT | Mod: 26 | Performed by: STUDENT IN AN ORGANIZED HEALTH CARE EDUCATION/TRAINING PROGRAM

## 2024-04-09 PROCEDURE — 255N000002 HC RX 255 OP 636: Performed by: STUDENT IN AN ORGANIZED HEALTH CARE EDUCATION/TRAINING PROGRAM

## 2024-04-09 PROCEDURE — 999N000132 HC STATISTIC PP CARE STAGE 1

## 2024-04-09 PROCEDURE — 36415 COLL VENOUS BLD VENIPUNCTURE: CPT | Performed by: NURSE PRACTITIONER

## 2024-04-09 PROCEDURE — 99152 MOD SED SAME PHYS/QHP 5/>YRS: CPT | Mod: GC | Performed by: STUDENT IN AN ORGANIZED HEALTH CARE EDUCATION/TRAINING PROGRAM

## 2024-04-09 PROCEDURE — 272N000504 HC NEEDLE CR4

## 2024-04-09 RX ORDER — NALOXONE HYDROCHLORIDE 0.4 MG/ML
0.4 INJECTION, SOLUTION INTRAMUSCULAR; INTRAVENOUS; SUBCUTANEOUS
Status: DISCONTINUED | OUTPATIENT
Start: 2024-04-09 | End: 2024-04-09 | Stop reason: HOSPADM

## 2024-04-09 RX ORDER — NALOXONE HYDROCHLORIDE 0.4 MG/ML
0.2 INJECTION, SOLUTION INTRAMUSCULAR; INTRAVENOUS; SUBCUTANEOUS
Status: DISCONTINUED | OUTPATIENT
Start: 2024-04-09 | End: 2024-04-09 | Stop reason: HOSPADM

## 2024-04-09 RX ORDER — IODIXANOL 320 MG/ML
100 INJECTION, SOLUTION INTRAVASCULAR ONCE
Status: COMPLETED | OUTPATIENT
Start: 2024-04-09 | End: 2024-04-09

## 2024-04-09 RX ORDER — SODIUM CHLORIDE 9 MG/ML
INJECTION, SOLUTION INTRAVENOUS CONTINUOUS
Status: DISCONTINUED | OUTPATIENT
Start: 2024-04-09 | End: 2024-04-09 | Stop reason: HOSPADM

## 2024-04-09 RX ORDER — FENTANYL CITRATE 50 UG/ML
25-50 INJECTION, SOLUTION INTRAMUSCULAR; INTRAVENOUS EVERY 5 MIN PRN
Status: DISCONTINUED | OUTPATIENT
Start: 2024-04-09 | End: 2024-04-09 | Stop reason: HOSPADM

## 2024-04-09 RX ORDER — LIDOCAINE 40 MG/G
CREAM TOPICAL
Status: DISCONTINUED | OUTPATIENT
Start: 2024-04-09 | End: 2024-04-09 | Stop reason: HOSPADM

## 2024-04-09 RX ORDER — FLUMAZENIL 0.1 MG/ML
0.2 INJECTION, SOLUTION INTRAVENOUS
Status: DISCONTINUED | OUTPATIENT
Start: 2024-04-09 | End: 2024-04-09 | Stop reason: HOSPADM

## 2024-04-09 RX ADMIN — MIDAZOLAM HYDROCHLORIDE 2 MG: 1 INJECTION, SOLUTION INTRAMUSCULAR; INTRAVENOUS at 14:52

## 2024-04-09 RX ADMIN — IODIXANOL 15 ML: 320 INJECTION, SOLUTION INTRAVASCULAR at 15:10

## 2024-04-09 RX ADMIN — SODIUM CHLORIDE: 9 INJECTION, SOLUTION INTRAVENOUS at 12:10

## 2024-04-09 RX ADMIN — LIDOCAINE HYDROCHLORIDE 5 ML: 10 INJECTION, SOLUTION EPIDURAL; INFILTRATION; INTRACAUDAL; PERINEURAL at 14:53

## 2024-04-09 RX ADMIN — FENTANYL CITRATE 100 MCG: 50 INJECTION, SOLUTION INTRAMUSCULAR; INTRAVENOUS at 14:52

## 2024-04-09 ASSESSMENT — ACTIVITIES OF DAILY LIVING (ADL)
ADLS_ACUITY_SCORE: 37

## 2024-04-09 NOTE — DISCHARGE INSTRUCTIONS
[facility]    Interventional Radiology  Patient Instructions Following Transjugular Liver Biopsy    AFTER YOU GO HOME  If you were given sedation, for the first 24 hours: we recommend that an adult stay with you, DO NOT drive or operate machinery at home or at work, DO NOT smoke, DO NOT make any important or legal decisions.  DO NOT drink alcoholic beverages the day of your procedure  Drink plenty of fluids   Resume your regular diet, unless otherwise instructed by your Primary Physician  Relax and take it easy for 48 hours  DO NOT do any strenuous exercise or lifting (> 10 lbs) for at least 3 days following your procedure  Keep the dressing dry and in place for 24 hours. Replace with Band aid for 2 days.  Never leave a wet dressing in place.  Do not take a shower for at least 12 hours following your procedure. No tub bath, hot tub, or swimming for 5 days.  There should be minimum drainage from the biopsy site    CALL THE PHYSICIAN IF:  You start bleeding from the procedure site.  If you do start to bleed from that site,  hold pressure on the site for a minimum of 10 minutes.  Your physician will tell you if you need to return to the hospital  You develop nausea or vomiting  You have excessive swelling, redness, or tenderness at the site  You have drainage that looks like it is infected.  You experience severe pain  You develop hives or a rash or unexplained itching  You develop a temperature of 101 degrees F or greater    ADDITIONAL INSTRUCTIONS: None    [facility] INTERVENTIONAL RADIOLOGY DEPARTMENT  Procedure Physician: Jose Manuel Hermosillo PA-C Date of procedure: April 9, 2024  Telephone Numbers: [phone]      Monday-Friday 7:30 am to 4:00 pm  [phone] After 4:00 pm Monday-Friday, Weekends & Holidays.   Ask for the Interventional Radiologist on call.  Someone is on call 24 hrs/day  [facility] toll free number: [phone] Monday-Friday 8:00 am to 4:30 pm  [facility] Emergency Dept: [phone]

## 2024-04-09 NOTE — PROGRESS NOTES
Patient Name: Blanco Osborne  Medical Record Number: [id number]  Today's Date: 4/9/2024    Procedure: Transjugular liver biopsy.  Proceduralist: MD Pool  Pathology present: n/a. 3 cores obtained and sent to lab.    Procedure Start: 1411  Procedure end: 1452  Sedation medications administered: Fentanyl: 100 mcg Versed:2mg     Report given to: 2A, RN  : m/a    Other Notes: Pt arrived to IR room 5 from . Consent reviewed. Pt denies any questions or concerns regarding procedure. Pt positioned supine and monitored per protocol. Pt tolerated procedure without any noted complications. Pt transferred back to .    Kelly Geiger, RN

## 2024-04-09 NOTE — IR NOTE
Free Hepatic (23)  Wedge Hepatic (26)  IVC Below (12)  IVC at anastomosis (12) (11)  IVC Above (10)  RA (5)

## 2024-04-09 NOTE — PROGRESS NOTES
Pt arrived on 2a post liver biopsy. VSS RA. Dressing c/d/I. No pain. Family at bedside. Pt eating and drinking.

## 2024-04-09 NOTE — PRE-PROCEDURE
GENERAL PRE-PROCEDURE:   Procedure:  Transjug liver biopsy with portal pressures  Date/Time:  4/9/2024 2:13 PM    Verbal consent obtained?: Yes    Written consent obtained?: Yes    Risks and benefits: Risks, benefits and alternatives were discussed    Consent given by:  Patient  Patient states understanding of procedure being performed: Yes    Patient's understanding of procedure matches consent: Yes    Procedure consent matches procedure scheduled: Yes    Expected level of sedation:  Moderate  Appropriately NPO:  Yes  ASA Class:  2  Mallampati  :  Grade 2- soft palate, base of uvula, tonsillar pillars, and portion of posterior pharyngeal wall visible  Lungs:  Lungs clear with good breath sounds bilaterally  Heart:  Normal heart sounds and rate  History & Physical reviewed:  History and physical reviewed and no updates needed  Statement of review:  I have reviewed the lab findings, diagnostic data, medications, and the plan for sedation

## 2024-04-09 NOTE — PROCEDURES
[facility]    Procedure: IR Procedure Note    Date/Time: 4/9/2024 2:55 PM    Performed by: Jose Manuel Hermosillo MD  Authorized by: Jose Manuel Hermosillo MD  IR Fellow Physician:  Radiology Resident Physician: Jose Manuel Hermosillo  Other(s) attending procedure: Marciano Lanza      UNIVERSAL PROTOCOL   Site Marked: NA  Prior Images Obtained and Reviewed:  Yes  Required items: Required blood products, implants, devices and special equipment available    Patient identity confirmed:  Verbally with patient, arm band, provided demographic data and hospital-assigned identification number  Patient was reevaluated immediately before administering moderate or deep sedation or anesthesia  Confirmation Checklist:  Patient's identity using two indicators, relevant allergies, procedure was appropriate and matched the consent or emergent situation and correct equipment/implants were available  Time out: Immediately prior to the procedure a time out was called    Universal Protocol: the Joint Commission Universal Protocol was followed    Preparation: Patient was prepped and draped in usual sterile fashion       ANESTHESIA    Anesthesia:  Local infiltration  Local Anesthetic:  Lidocaine 1% without epinephrine      SEDATION  Patient Sedated: Yes    Sedation:  Fentanyl and midazolam  Vital signs: Vital signs monitored during sedation    See dictated procedure note for full details.  Findings: Successful TJ liver biopsy and portal pressures. No significant portal systemic gradient.    Specimens: none    Complications: None    Condition: Stable    Plan: 1 hour bedrest with HoB at 30 degrees then resume prior activity.      PROCEDURE  Describe Procedure: Successful TJ liver biopsy and portal pressures. No significant portal systemic gradient.  Patient Tolerance:  Patient tolerated the procedure well with no immediate complications  Length of time physician/provider present for 1:1 monitoring during sedation:  30

## 2024-04-10 ENCOUNTER — TELEPHONE (OUTPATIENT)
Dept: OTHER | Facility: CLINIC | Age: 59
End: 2024-04-10
Payer: COMMERCIAL

## 2024-04-10 LAB
PATH REPORT.COMMENTS IMP SPEC: NORMAL
PATH REPORT.FINAL DX SPEC: NORMAL
PATH REPORT.GROSS SPEC: NORMAL
PATH REPORT.MICROSCOPIC SPEC OTHER STN: NORMAL
PATH REPORT.RELEVANT HX SPEC: NORMAL
PHOTO IMAGE: NORMAL

## 2024-04-10 NOTE — TELEPHONE ENCOUNTER
Future Appointments   Date Time Provider Department Center   4/25/2024  1:30 PM [id number] [facility]   4/25/2024  2:30 PM Deejay Mcneil MD [facility]     Patients spouse asking if catheter will be removed at this visit, and if so, does the patient need to hold meds.    [facility]  [phone]

## 2024-04-10 NOTE — TELEPHONE ENCOUNTER
[facility] VASCULAR [facility] CENTER    Who is the name of the provider?:  ELICEO PALUMBO   What is the location you see this provider at/preferred location?: [place]  Person calling / Facility: Chelsi / Ernagarcía  Phone number: [phone]  Nurse call back needed:  YES     Reason for call:  Chelsi wondering if we can assist CVC catheter removal. Please call her back to confirm.    Pharmacy location:     [facility] [address] [facility] - [place] [address] [address]  [id number] - [place], [place] - [address] AT [facility] [address] [id number] - [place], [place] - [address]  [facility][id number] - [place], [place] - [address] AT [facility] [address]  [facility] # [id number] - [place], [place] - [address]  Outside Imaging: n/a   Can we leave a detailed message on this number?  YES     4/10/2024, 11:21 AM

## 2024-04-10 NOTE — TELEPHONE ENCOUNTER
Please schedule patient for the following:  AVF US as ordered per Saima  30 minute return visit with Dr. Mcneil    Routing to scheduling to contact patient to coordinate above.    Raquel Rod, ANANDN, RN, [facility] [place]

## 2024-04-10 NOTE — TELEPHONE ENCOUNTER
Pending results of US, plan would be to remove CVC tunnel catheter at this visit.  There is no need to hold any medications prior to this.  LVM with [facility] explaining this.    Raquel Rod, ANANDN, RN, [facility] [place]

## 2024-04-11 ENCOUNTER — HOSPITAL ENCOUNTER (OUTPATIENT)
Dept: CARDIAC REHAB | Facility: CLINIC | Age: 59
Discharge: HOME | End: 2024-04-11
Attending: STUDENT IN AN ORGANIZED HEALTH CARE EDUCATION/TRAINING PROGRAM
Payer: COMMERCIAL

## 2024-04-11 ENCOUNTER — TRANSFERRED RECORDS (OUTPATIENT)
Dept: HEALTH INFORMATION MANAGEMENT | Facility: CLINIC | Age: 59
End: 2024-04-11
Payer: COMMERCIAL

## 2024-04-11 DIAGNOSIS — Z99.2 ESRD (END STAGE RENAL DISEASE) ON DIALYSIS (H): ICD-10-CM

## 2024-04-11 DIAGNOSIS — N18.6 ESRD (END STAGE RENAL DISEASE) ON DIALYSIS (H): ICD-10-CM

## 2024-04-11 DIAGNOSIS — G25.81 RESTLESS LEG SYNDROME: ICD-10-CM

## 2024-04-11 DIAGNOSIS — K74.60 CIRRHOSIS OF LIVER WITH ASCITES, UNSPECIFIED HEPATIC CIRRHOSIS TYPE (H): ICD-10-CM

## 2024-04-11 DIAGNOSIS — Z94.4 LIVER TRANSPLANTED (H): ICD-10-CM

## 2024-04-11 DIAGNOSIS — G47.00 INSOMNIA, UNSPECIFIED TYPE: ICD-10-CM

## 2024-04-11 DIAGNOSIS — R18.8 CIRRHOSIS OF LIVER WITH ASCITES, UNSPECIFIED HEPATIC CIRRHOSIS TYPE (H): ICD-10-CM

## 2024-04-11 PROCEDURE — 93798 PHYS/QHP OP CAR RHAB W/ECG: CPT | Performed by: REHABILITATION PRACTITIONER

## 2024-04-12 ENCOUNTER — TELEPHONE (OUTPATIENT)
Dept: TRANSPLANT | Facility: CLINIC | Age: 59
End: 2024-04-12
Payer: COMMERCIAL

## 2024-04-12 DIAGNOSIS — Z76.82 AWAITING ORGAN TRANSPLANT: Primary | ICD-10-CM

## 2024-04-12 RX ORDER — CIPROFLOXACIN 250 MG/1
250 TABLET, FILM COATED ORAL DAILY
Qty: 90 TABLET | Refills: 0 | OUTPATIENT
Start: 2024-04-12

## 2024-04-12 RX ORDER — HYDROXYZINE HYDROCHLORIDE 25 MG/1
25 TABLET, FILM COATED ORAL 3 TIMES DAILY PRN
Qty: 90 TABLET | Refills: 1 | Status: SHIPPED | OUTPATIENT
Start: 2024-04-12

## 2024-04-12 RX ORDER — B COMPLEX, C NO.20/FOLIC ACID 1 MG
1 CAPSULE ORAL DAILY
Qty: 90 CAPSULE | Refills: 3 | Status: SHIPPED | OUTPATIENT
Start: 2024-04-12

## 2024-04-12 NOTE — TELEPHONE ENCOUNTER
Blanco is in the middle of a kidney transplant eval.  He had a liver biopsy on 4/9.    Final Diagnosis   LIVER ALLOGRAFT, NEEDLE BIOPSY:  Severe hepatitis with many plasma cells, activity grade 3-4 /4; likely mild fibrosis but need to reasses after resolution of inflammation  (See Comment)    Shared w/ Dr. Simms, added to path conf on 4/19.  No changes to anything at this point.    Called Blanco to let him know that we will review next week at path conf.

## 2024-04-12 NOTE — TELEPHONE ENCOUNTER
Called pt to touch base on pre-kidney transplant evaluation status. Provided direct line for return call.     Called pt's dialysis unit and let them know I am sending an order for PRA lab testing, they confirmed they can go ahead and do that. I also requested they send over a BP report and provided our fax number.

## 2024-04-12 NOTE — LETTER
PHYSICIAN ORDER   ALA/PRA BLOOD    DATE & TIME ISSUED: 2024 12:58 PM  PATIENT NAME: Blanco Osborne   : 1965     [facility] MR# [if applicable]: [id number]     DIAGNOSIS/ICD-10 CODE: Awaiting Organ transplant [Z76.82}  EXPIRES: (1 YEAR AFTER DATE ISSUED)  Every 3 months: please collect at next run, then every 3 months. For any questions, please call PETER Mcdonald [phone]    Please draw 20ml of blood in red top (plain) tube for Antileukocyte Antibody (ALA or PRA).  Label tubes with the patient s name, complete lab slip.    Mailers, lab slips with instructions are sent to patient separately.  Call the [facility] at [phone] to reorder mailers.  Mail blood to (this address is also on the mailers):    IMMUNOLOGY LABORATORY   [facility]    Room [address]  [address]          Zoran Baker MD  Surgical Professor, Kidney Transplantation

## 2024-04-15 ENCOUNTER — LAB (OUTPATIENT)
Dept: LAB | Facility: CLINIC | Age: 59
End: 2024-04-15
Payer: COMMERCIAL

## 2024-04-15 DIAGNOSIS — Z76.82 AWAITING ORGAN TRANSPLANT: ICD-10-CM

## 2024-04-15 PROCEDURE — 86833 HLA CLASS II HIGH DEFIN QUAL: CPT

## 2024-04-15 PROCEDURE — 86832 HLA CLASS I HIGH DEFIN QUAL: CPT

## 2024-04-16 ENCOUNTER — HOSPITAL ENCOUNTER (OUTPATIENT)
Dept: CARDIAC REHAB | Facility: CLINIC | Age: 59
Discharge: HOME | End: 2024-04-16
Attending: STUDENT IN AN ORGANIZED HEALTH CARE EDUCATION/TRAINING PROGRAM
Payer: COMMERCIAL

## 2024-04-16 PROCEDURE — 93798 PHYS/QHP OP CAR RHAB W/ECG: CPT

## 2024-04-18 ENCOUNTER — HOSPITAL ENCOUNTER (OUTPATIENT)
Dept: CARDIAC REHAB | Facility: CLINIC | Age: 59
Discharge: HOME | End: 2024-04-18
Attending: STUDENT IN AN ORGANIZED HEALTH CARE EDUCATION/TRAINING PROGRAM
Payer: COMMERCIAL

## 2024-04-18 ENCOUNTER — TELEPHONE (OUTPATIENT)
Dept: TRANSPLANT | Facility: CLINIC | Age: 59
End: 2024-04-18
Payer: COMMERCIAL

## 2024-04-18 PROCEDURE — 93798 PHYS/QHP OP CAR RHAB W/ECG: CPT

## 2024-04-18 NOTE — TELEPHONE ENCOUNTER
Pt and wife called to let me know he has completed his Dermatology visit recently w/ Dr. Ann Kilpatrick at [facility] in [place]. I will request records and explained that we will contact him if he needs to fill out an SONI. We discussed that his case will be reviewed by the Committee next Weds and I will call him with the outcome. He stated he had a potential donor get declined d/t pre-diabetes. I encouraged him to continue to seek living donors and we reviewed that the Committee thought he may be someone that is only approved for LDKT, but that we will make a formal decision w/ the Committee as we review his work up. I have sent them information on the BABG event coming up. He and his wife had no further questions, will follow up after Committee 4/24/24.

## 2024-04-19 ENCOUNTER — TELEPHONE (OUTPATIENT)
Dept: TRANSPLANT | Facility: CLINIC | Age: 59
End: 2024-04-19
Payer: COMMERCIAL

## 2024-04-19 DIAGNOSIS — Z94.4 LIVER TRANSPLANTED (H): Primary | ICD-10-CM

## 2024-04-19 NOTE — TELEPHONE ENCOUNTER
Call back to Blanco.  He will go in for labs next week.   He has been on cipro since last fall.  At that time he had ascites and required para's.  He hasn't had a para and does not have ascites so his is wondering if he can stop it.  Message to Dr. Simms.  He no longer has a live kidney donor.  He has not heard if he will be listed but apparently was told he would be discussed next week.   Seeing Dr. Simms on June 24.

## 2024-04-19 NOTE — TELEPHONE ENCOUNTER
Haven't had a tac level or liver tests since January.  Call to Blanco to ask him to go in for these next week get an update as to how he's feeling.  No answer.   Call to Ofe.  No answer.  DICK

## 2024-04-22 ENCOUNTER — TELEPHONE (OUTPATIENT)
Dept: TRANSPLANT | Facility: CLINIC | Age: 59
End: 2024-04-22
Payer: COMMERCIAL

## 2024-04-22 NOTE — TELEPHONE ENCOUNTER
Per Dr. Simms it's ok to stop cipro.  Please let Blanco / Ofe know this.  I removed from med list.

## 2024-04-23 NOTE — PROGRESS NOTES
[facility]    Blanco Osborne has end-stage renal disease.  He has a left upper arm brachiocephalic fistula that is required multiple revisions.      --3/21/2023: Creation left upper arm proximal radial to cephalic fistula    -- 8/15/2023: Repair left fistula pseudoaneurysms x 2 and outflow revision of the shoulder cephalic vein to brachial vein    -- 1/3/2024: Large hematoma from pseudoaneurysm.  Evacuation of hematoma with repair dialysis needle hole.  Cephalic vein patch to outflow stenotic area.    Dialysis via tunneled catheter.    -- 2/21/2024, fistula duplex: Widely patent with outflow volume= 727 mL/min.  Stenosis in upper arm at 2.7 mm otherwise excellent diameter proximally and distally      PRESENT SITUATION: Dialysis via the left arm fistula has been working very well.  No needle hole bleeding or other issues.    Finishing his testing for kidney transplant at the [facility].  Has questions why he needs to continue on the Eliquis since it was started when he had pericarditis and this has resolved.    Recent liver transplant biopsy(transplant on 9/20/2016) on 4/9/2024 revealing severe hepatitis with plasma cells and mild fibrosis.    Exam: Alert and appropriate.  Normal affect.  Fully ambulatory   Blood pressure 123/78 right arm.  Pulse 108   Right jugular tunneled catheter well incorporated.  No infection   Well dilated left upper arm arteriovenous fistula.  No arm swelling.    No ischemic changes    Fistula duplex today reveals a widely patent fistula and anastomosis.  Outflow volume improved to 1081 mL/min which is very adequate.  We do see an narrowing in the proximal one quarter of the upper arm with the fistula goes down to 2.1 mm.  This measures 11 mm proximally and 8 mm distally.        PROCEDURE: Brought to our procedure room and placed in a sitting position.  Stay suture on the right jugular tunneled catheter was cut.  Timeout was called and the sites were identified.  Prepped  with ChloraPrep.  Anesthetized with 10 mL of 1% lidocaine with an excellent field block.  The well incorporated cuff was dissected free and the tunnel catheter was removed in its entirety.  Pressure was held over the internal jugular entrance site to prevent backbleeding.  Skin edges were excised with a 15 blade scalpel and the wound approximated with interrupted 4-0 nylon sutures followed by bacitracin gauze dressing    IMPRESSION:  #1.  Removal of right jugular tunneled catheter.  Suture removal in 7 to 10 days.  May shower tomorrow.  Protective Band-Aid over site.  Will hold his Eliquis for additional 2 days to prevent tunnel tract bleeding.    #2.  Focal stenosis of the left upper arm fistula likely due to intimal hyperplasia.  This is above will be placed vein patch.  I do feel would be appropriate to perform the vein patch angioplasty and make sure the fistula continues to function well.  Even if he gets a renal transplant we do want the fistula to function for some time in case he has an episode of rejection.  Would plan to use an arm vein for the patch.  He will schedule this at his convenience.  He will hold his Eliquis for 48 hours for this procedure also.  Dialysis unit would be able to use the fistula the following day since this is above the sites of access.    Deejay Mcneil MD  This note was created using Dragon voice recognition software which may result in transcription errors.

## 2024-04-24 ENCOUNTER — TRANSFERRED RECORDS (OUTPATIENT)
Dept: HEALTH INFORMATION MANAGEMENT | Facility: CLINIC | Age: 59
End: 2024-04-24
Payer: COMMERCIAL

## 2024-04-24 ENCOUNTER — TELEPHONE (OUTPATIENT)
Dept: TRANSPLANT | Facility: CLINIC | Age: 59
End: 2024-04-24
Payer: COMMERCIAL

## 2024-04-24 NOTE — TELEPHONE ENCOUNTER
Called pt's dialysis unit, requested his BP report get faxed to us. Provided fax number, they agreed to send.

## 2024-04-24 NOTE — TELEPHONE ENCOUNTER
Called pt to discuss pre-kidney transplant evaluation status. We discussed that we will review his case next week so that we can review the notes from his Neurology visit on 4/30/24 and have more information regarding his liver biopsy results from Dr. Simms as well as input regarding his thrombocytopenia. Pt expressed understanding of the plan and had no further questions.

## 2024-04-25 ENCOUNTER — HOSPITAL ENCOUNTER (OUTPATIENT)
Dept: CARDIAC REHAB | Facility: CLINIC | Age: 59
Discharge: HOME | End: 2024-04-25
Attending: STUDENT IN AN ORGANIZED HEALTH CARE EDUCATION/TRAINING PROGRAM
Payer: COMMERCIAL

## 2024-04-25 ENCOUNTER — OFFICE VISIT (OUTPATIENT)
Dept: OTHER | Facility: CLINIC | Age: 59
End: 2024-04-25
Attending: SURGERY
Payer: COMMERCIAL

## 2024-04-25 ENCOUNTER — HOSPITAL ENCOUNTER (OUTPATIENT)
Dept: ULTRASOUND IMAGING | Facility: CLINIC | Age: 59
Discharge: HOME | End: 2024-04-25
Attending: SURGERY
Payer: COMMERCIAL

## 2024-04-25 VITALS — DIASTOLIC BLOOD PRESSURE: 78 MMHG | HEART RATE: 108 BPM | SYSTOLIC BLOOD PRESSURE: 123 MMHG

## 2024-04-25 DIAGNOSIS — Z99.2 ESRD (END STAGE RENAL DISEASE) ON DIALYSIS (H): ICD-10-CM

## 2024-04-25 DIAGNOSIS — I77.0 ARTERIOVENOUS FISTULA (H): Primary | ICD-10-CM

## 2024-04-25 DIAGNOSIS — T82.898A OTHER SPECIFIED COMPLICATION OF VASCULAR PROSTHETIC DEVICES, IMPLANTS AND GRAFTS, INITIAL ENCOUNTER (H): ICD-10-CM

## 2024-04-25 DIAGNOSIS — I77.0 ARTERIOVENOUS FISTULA (H): ICD-10-CM

## 2024-04-25 DIAGNOSIS — N18.6 ESRD (END STAGE RENAL DISEASE) ON DIALYSIS (H): ICD-10-CM

## 2024-04-25 DIAGNOSIS — T82.858A ARTERIOVENOUS FISTULA STENOSIS (H): ICD-10-CM

## 2024-04-25 DIAGNOSIS — Z49.01 ENCOUNTER FOR DIALYSIS CATHETER CARE (H): ICD-10-CM

## 2024-04-25 PROCEDURE — 36589 REMOVAL TUNNELED CV CATH: CPT | Performed by: SURGERY

## 2024-04-25 PROCEDURE — G0463 HOSPITAL OUTPT CLINIC VISIT: HCPCS | Performed by: SURGERY

## 2024-04-25 PROCEDURE — 99213 OFFICE O/P EST LOW 20 MIN: CPT | Mod: 25 | Performed by: SURGERY

## 2024-04-25 PROCEDURE — 93798 PHYS/QHP OP CAR RHAB W/ECG: CPT | Performed by: REHABILITATION PRACTITIONER

## 2024-04-25 PROCEDURE — 93990 DOPPLER FLOW TESTING: CPT

## 2024-04-25 NOTE — PROGRESS NOTES
[facility]        Patient is here for a  follow up.    Pt is currently taking Aspirin and Eliquis.    /78 (BP Location: Right arm, Patient Position: Chair, Cuff Size: Adult Regular)   Pulse 108     The provider has been notified that the patient has no concerns.     Questions patient would like addressed today are: N/A.    Refills are needed: N/A    Has homecare services and agency name:  Parul Valdez MA

## 2024-04-26 ENCOUNTER — TELEPHONE (OUTPATIENT)
Dept: OTHER | Facility: CLINIC | Age: 59
End: 2024-04-26

## 2024-04-26 NOTE — TELEPHONE ENCOUNTER
CASE RECEIVED ON 04/26/24 FOR:VEIN PATCH ANGIOPLASTY TO LEFT RADIAL TO CEPHALIC FISTULA     CASE ID:[id number]    Per surgery form to schedule patient beginning of June.

## 2024-04-27 ENCOUNTER — NURSE TRIAGE (OUTPATIENT)
Dept: NURSING | Facility: CLINIC | Age: 59
End: 2024-04-27
Payer: COMMERCIAL

## 2024-04-27 ENCOUNTER — HOSPITAL ENCOUNTER (OUTPATIENT)
Facility: CLINIC | Age: 59
Setting detail: OBSERVATION
Discharge: HOME | End: 2024-04-28
Attending: EMERGENCY MEDICINE | Admitting: INTERNAL MEDICINE
Payer: COMMERCIAL

## 2024-04-27 DIAGNOSIS — Z99.2 ANEMIA DUE TO CHRONIC KIDNEY DISEASE, ON CHRONIC DIALYSIS (H): Primary | ICD-10-CM

## 2024-04-27 DIAGNOSIS — I97.618 POSTOPERATIVE HEMORRHAGE INVOLVING CIRCULATORY SYSTEM FOLLOWING CIRCULATORY SYSTEM PROCEDURE: ICD-10-CM

## 2024-04-27 DIAGNOSIS — D63.1 ANEMIA DUE TO CHRONIC KIDNEY DISEASE, ON CHRONIC DIALYSIS (H): Primary | ICD-10-CM

## 2024-04-27 DIAGNOSIS — N18.6 ANEMIA DUE TO CHRONIC KIDNEY DISEASE, ON CHRONIC DIALYSIS (H): Primary | ICD-10-CM

## 2024-04-27 LAB
APTT PPP: 33 SECONDS (ref 22–38)
ERYTHROCYTE [DISTWIDTH] IN BLOOD BY AUTOMATED COUNT: 14.8 % (ref 10–15)
HCT VFR BLD AUTO: 27.6 % (ref 40–53)
HGB BLD-MCNC: 9.3 G/DL (ref 13.3–17.7)
HGB BLD-MCNC: 9.3 G/DL (ref 13.3–17.7)
INR PPP: 1.23 (ref 0.85–1.15)
MCH RBC QN AUTO: 32.7 PG (ref 26.5–33)
MCHC RBC AUTO-ENTMCNC: 33.7 G/DL (ref 31.5–36.5)
MCV RBC AUTO: 97 FL (ref 78–100)
PLATELET # BLD AUTO: 71 10E3/UL (ref 150–450)
RBC # BLD AUTO: 2.84 10E6/UL (ref 4.4–5.9)
WBC # BLD AUTO: 3.1 10E3/UL (ref 4–11)

## 2024-04-27 PROCEDURE — G0378 HOSPITAL OBSERVATION PER HR: HCPCS

## 2024-04-27 PROCEDURE — 85018 HEMOGLOBIN: CPT | Performed by: EMERGENCY MEDICINE

## 2024-04-27 PROCEDURE — 85610 PROTHROMBIN TIME: CPT | Performed by: EMERGENCY MEDICINE

## 2024-04-27 PROCEDURE — 250N000013 HC RX MED GY IP 250 OP 250 PS 637: Performed by: INTERNAL MEDICINE

## 2024-04-27 PROCEDURE — 85027 COMPLETE CBC AUTOMATED: CPT | Performed by: STUDENT IN AN ORGANIZED HEALTH CARE EDUCATION/TRAINING PROGRAM

## 2024-04-27 PROCEDURE — 99222 1ST HOSP IP/OBS MODERATE 55: CPT | Performed by: INTERNAL MEDICINE

## 2024-04-27 PROCEDURE — 85730 THROMBOPLASTIN TIME PARTIAL: CPT | Performed by: EMERGENCY MEDICINE

## 2024-04-27 PROCEDURE — 250N000012 HC RX MED GY IP 250 OP 636 PS 637: Performed by: INTERNAL MEDICINE

## 2024-04-27 PROCEDURE — 99285 EMERGENCY DEPT VISIT HI MDM: CPT

## 2024-04-27 PROCEDURE — 36415 COLL VENOUS BLD VENIPUNCTURE: CPT | Performed by: EMERGENCY MEDICINE

## 2024-04-27 RX ORDER — HYDROXYZINE HYDROCHLORIDE 25 MG/1
50 TABLET, FILM COATED ORAL AT BEDTIME
Status: DISCONTINUED | OUTPATIENT
Start: 2024-04-27 | End: 2024-04-28 | Stop reason: HOSPADM

## 2024-04-27 RX ORDER — TACROLIMUS 0.5 MG/1
0.5 CAPSULE ORAL 2 TIMES DAILY
Status: DISCONTINUED | OUTPATIENT
Start: 2024-04-27 | End: 2024-04-28 | Stop reason: HOSPADM

## 2024-04-27 RX ORDER — AMOXICILLIN 250 MG
2 CAPSULE ORAL 2 TIMES DAILY PRN
Status: DISCONTINUED | OUTPATIENT
Start: 2024-04-27 | End: 2024-04-28 | Stop reason: HOSPADM

## 2024-04-27 RX ORDER — ROPINIROLE 0.5 MG/1
0.5 TABLET, FILM COATED ORAL 2 TIMES DAILY PRN
Status: DISCONTINUED | OUTPATIENT
Start: 2024-04-27 | End: 2024-04-28 | Stop reason: HOSPADM

## 2024-04-27 RX ORDER — TRAZODONE HYDROCHLORIDE 50 MG/1
50 TABLET, FILM COATED ORAL
Status: DISCONTINUED | OUTPATIENT
Start: 2024-04-27 | End: 2024-04-28 | Stop reason: HOSPADM

## 2024-04-27 RX ORDER — ONDANSETRON 2 MG/ML
4 INJECTION INTRAMUSCULAR; INTRAVENOUS EVERY 6 HOURS PRN
Status: DISCONTINUED | OUTPATIENT
Start: 2024-04-27 | End: 2024-04-28 | Stop reason: HOSPADM

## 2024-04-27 RX ORDER — AMOXICILLIN 250 MG
1 CAPSULE ORAL 2 TIMES DAILY PRN
Status: DISCONTINUED | OUTPATIENT
Start: 2024-04-27 | End: 2024-04-28 | Stop reason: HOSPADM

## 2024-04-27 RX ORDER — LACOSAMIDE 50 MG/1
50 TABLET ORAL EVERY MORNING
Status: DISCONTINUED | OUTPATIENT
Start: 2024-04-28 | End: 2024-04-28 | Stop reason: HOSPADM

## 2024-04-27 RX ORDER — OXYCODONE HYDROCHLORIDE 5 MG/1
5 TABLET ORAL EVERY 4 HOURS PRN
Status: DISCONTINUED | OUTPATIENT
Start: 2024-04-27 | End: 2024-04-28 | Stop reason: HOSPADM

## 2024-04-27 RX ORDER — ONDANSETRON 4 MG/1
4 TABLET, ORALLY DISINTEGRATING ORAL EVERY 6 HOURS PRN
Status: DISCONTINUED | OUTPATIENT
Start: 2024-04-27 | End: 2024-04-28 | Stop reason: HOSPADM

## 2024-04-27 RX ORDER — HYDROXYZINE HYDROCHLORIDE 25 MG/1
25 TABLET, FILM COATED ORAL EVERY MORNING
Status: DISCONTINUED | OUTPATIENT
Start: 2024-04-28 | End: 2024-04-28 | Stop reason: HOSPADM

## 2024-04-27 RX ORDER — B COMPLEX, C NO.20/FOLIC ACID 1 MG
1 CAPSULE ORAL DAILY
Status: DISCONTINUED | OUTPATIENT
Start: 2024-04-28 | End: 2024-04-28 | Stop reason: HOSPADM

## 2024-04-27 RX ORDER — HYDROXYZINE HYDROCHLORIDE 25 MG/1
25 TABLET, FILM COATED ORAL 3 TIMES DAILY PRN
Status: DISCONTINUED | OUTPATIENT
Start: 2024-04-27 | End: 2024-04-27

## 2024-04-27 RX ORDER — LACOSAMIDE 100 MG/1
100 TABLET ORAL EVERY EVENING
Status: DISCONTINUED | OUTPATIENT
Start: 2024-04-27 | End: 2024-04-28 | Stop reason: HOSPADM

## 2024-04-27 RX ORDER — SEVELAMER CARBONATE 800 MG/1
800 TABLET, FILM COATED ORAL
Status: DISCONTINUED | OUTPATIENT
Start: 2024-04-28 | End: 2024-04-27

## 2024-04-27 RX ORDER — GABAPENTIN 300 MG/1
300 CAPSULE ORAL
Status: DISCONTINUED | OUTPATIENT
Start: 2024-04-27 | End: 2024-04-28 | Stop reason: HOSPADM

## 2024-04-27 RX ORDER — SEVELAMER CARBONATE 800 MG/1
800 TABLET, FILM COATED ORAL
Status: DISCONTINUED | OUTPATIENT
Start: 2024-04-27 | End: 2024-04-28 | Stop reason: HOSPADM

## 2024-04-27 RX ADMIN — TACROLIMUS 0.5 MG: 0.5 CAPSULE ORAL at 20:51

## 2024-04-27 RX ADMIN — OXYCODONE HYDROCHLORIDE 5 MG: 5 TABLET ORAL at 20:50

## 2024-04-27 RX ADMIN — GABAPENTIN 300 MG: 300 CAPSULE ORAL at 23:29

## 2024-04-27 RX ADMIN — SEVELAMER CARBONATE 800 MG: 800 TABLET, FILM COATED ORAL at 23:29

## 2024-04-27 RX ADMIN — TRAZODONE HYDROCHLORIDE 50 MG: 50 TABLET ORAL at 23:33

## 2024-04-27 RX ADMIN — HYDROXYZINE HYDROCHLORIDE 50 MG: 25 TABLET, FILM COATED ORAL at 22:53

## 2024-04-27 RX ADMIN — LACOSAMIDE 100 MG: 100 TABLET, FILM COATED ORAL at 20:46

## 2024-04-27 ASSESSMENT — COLUMBIA-SUICIDE SEVERITY RATING SCALE - C-SSRS
1. IN THE PAST MONTH, HAVE YOU WISHED YOU WERE DEAD OR WISHED YOU COULD GO TO SLEEP AND NOT WAKE UP?: NO
2. HAVE YOU ACTUALLY HAD ANY THOUGHTS OF KILLING YOURSELF IN THE PAST MONTH?: NO
6. HAVE YOU EVER DONE ANYTHING, STARTED TO DO ANYTHING, OR PREPARED TO DO ANYTHING TO END YOUR LIFE?: NO

## 2024-04-27 ASSESSMENT — ACTIVITIES OF DAILY LIVING (ADL)
ADLS_ACUITY_SCORE: 37
ADLS_ACUITY_SCORE: 35

## 2024-04-27 NOTE — ED PROVIDER NOTES
History     Chief Complaint:  Post-op Problem       HPI   Blanco Osborne is a 58 year old male, anticoagulated (Eliquis), with a history of acute renal failure, presenting to the emergency department for evaluation constant bleeding from his central venous catheter site after it was removed two days ago. The patient notes pain with pressure upon the affected site. He was told to stop taking his Eliquis two days ago, and reports clotting in his fistula but not his central venous catheter site.         Independent Historian:   None - Patient Only    Review of External Notes:   Yes-I reviewed the patient's visit with his vascular surgeon on April 25 when he had his dialysis catheter removed.      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   Insomnia   Lactic acidosis   Liver transplant  NAFLD  [facility]   Parapneumonic effusion  Pleural effusion  Pneumothorax, left  Respiratory acidosis   Respiratory arrest   RLD   SBP  Seizures   Sepsis   Stroke   Cardiac arrest  Tubular   Adenoma     Past Surgical History:    Appendectomy   Bench liver  Bronchoscopy flexible and rigid   Colonoscopy  Create fistula arteriovenous upper extremity  CV coronary angiogram   CV pericardiocentesis   CV right heart cath measurements recorded   Hernia repair  IR chest tube placement  IR CVC tunnel placement  IR dialysis fistulogram   IR paracentesis   IR transcatheter biopsy  Revision fistula arteriovenous upper  extremity  Tracheostomy  Transplant liver recipient  donor     Physical Exam   Patient Vitals for the past 24 hrs:   BP Temp Temp src Pulse Resp SpO2 Weight   24 1734 130/78 98.3  F (36.8  C) Temporal 74 20 98 % 88.5 kg (195 lb)        Physical Exam    Eye:  Pupils are equal, round, and reactive.  Extraocular movements intact.    ENT:  No rhinorrhea.  Moist mucus membranes.  Normal tongue and tonsil.    Cardiac:  Regular rate and rhythm.  No murmurs, gallops, or rubs.    Pulmonary:  Clear to auscultation bilaterally.  No wheezes, rales, or rhonchi.    Abdomen:  Positive bowel sounds.  Abdomen is soft and non-distended, without focal tenderness.    Musculoskeletal:  Normal movement of all extremities without evidence for deficit.    Skin:  Warm and dry without rashes. Focused exam of the exam of the right anterior chest wall shows sutures in place on the site of prior dialysis tunnel. There is scant venous oozing coming from this site. Small palpable hematoma below this site.     Neurologic:  Non-focal exam without asymmetric weakness or numbness.     Psychiatric:  Normal affect with appropriate interaction with examiner.      Emergency Department Course     Imaging:  No orders to display          Laboratory:  Labs Ordered and Resulted from Time of ED Arrival to Time of ED Departure   HEMOGLOBIN - Abnormal       Result Value    Hemoglobin 9.3 (*)    INR - Abnormal    INR 1.23 (*)    PARTIAL THROMBOPLASTIN TIME - Normal    aPTT 33     CBC WITH PLATELETS            Emergency Department Course & Assessments:    Interventions:  Medications - No data to display     Assessments:  6447      I obtained history and examined the patient as noted above.     Independent Interpretation (X-rays, CTs, rhythm strip):  None    Consultations/Discussion of Management or Tests:    I consulted with the vascular surgery fellow at the bedside and discussed plan of care.    ED Course as of 24 1847   Sat 2024    1847 I spoke to Dr. Rodríguez regarding admitting the patient.        Social Determinants of Health affecting care:   None    Disposition:  The patient was admitted to the hospital under the care of Dr. Rodríguez.     Impression & Plan    CMS Diagnoses: None         Medical Decision Making:  This 58-year-old with a history of renal failure, recently having an AV fistula placed in his arm which is now mature, presents to us with ongoing bleeding after having his dialysis catheter removed from the right chest wall.  This was removed 2 days ago and the patient was advised to hold his Eliquis.  Since then, he has had persistent oozing of blood from his dressing, requiring it to be changed every 2-3 hours.  He denies any associated pain.  He does feel somewhat lightheaded today.    On exam, there is venous oozing coming from his sutured chest wall site but no evidence of arterial bleed.  There is a palpable small underlying hematoma.  I spoke with our vascular surgery team.  We placed a weight over this area to assist with compression.  His hemoglobin has dropped approximately 2 points and recommendation from vascular is to have him admitted under observation status to assure that this bleeding stops.  I spoke with Dr. Rodríguez of the hospitalist service who agrees to admit the patient under observation status.      Diagnosis:    ICD-10-CM    1. Postoperative hemorrhage involving circulatory system following circulatory system procedure  I97.618            Discharge Medications:  New Prescriptions    No medications on file          Chad A. Trierweiler, MD  4/27/2024   Trierweiler, Chad A, MD Trierweiler, Chad A, MD  04/28/24 1103

## 2024-04-27 NOTE — ED NOTES
[facility]  ED Nurse Handoff Report    ED Chief complaint: Post-op Problem      ED Diagnosis:   Final diagnoses:   Postoperative hemorrhage involving circulatory system following circulatory system procedure       Code Status: Full Code    Allergies: No Known Allergies    Patient Story: Blanco Osborne is a 58 year old male who presents to the Emergency Department for an evaluation of a post op problem.   Focused Assessment:  Cardiac: WDL  Resp: WDL  Neuro: A&Ox4  Skin: slow bleeding from R chest wall where port was taken out.    Treatments and/or interventions provided: na  Patient's response to treatments and/or interventions: na    To be done/followed up on inpatient unit:  continue care and monitor    Does this patient have any cognitive concerns?:  na    Activity level - Baseline/Home:  Independent  Activity Level - Current:   Independent    Patient's Preferred language: English   Needed?: No    Isolation: None  Infection: Not Applicable  Patient tested for COVID 19 prior to admission: NO  Bariatric?: No    Vital Signs:   Vitals:    04/27/24 1734   BP: 130/78   Pulse: 74   Resp: 20   Temp: 98.3  F (36.8  C)   TempSrc: Temporal   SpO2: 98%   Weight: 88.5 kg (195 lb)       Cardiac Rhythm:     Was the PSS-3 completed:   Yes  What interventions are required if any?               Family Comments: wife at bedside  OBS brochure/video discussed/provided to patient/family: No              Name of person given brochure if not patient: na              Relationship to patient: na    For the majority of the shift this patient's behavior was Green.   Behavioral interventions performed were na.    ED NURSE PHONE NUMBER: *[phone]

## 2024-04-27 NOTE — TELEPHONE ENCOUNTER
Nurse Triage SBAR    Situation: Previous central line site leaking.     Background:   -Patient calling  -It is okay to call back and leave a detailed message at this number:    Assessment: Pt is on dialysis and had central line removed this week.  Site continues to bleed.  He was told by nurse who removed dressing yesterday to go to UC or ED to get bleeding to stop. He is wondering which type of facility he should go to. Advised ED can provide more comprehensive care. Pt agreeable and stated he will go to ED.  He declined assistance finding an ED.          Reason for Disposition    [1] Follow-up call to recent contact AND [2] information only call, no triage required    Protocols used: Information Only Call - No Triage-A-

## 2024-04-27 NOTE — ED TRIAGE NOTES
CVC removed 2 days ago and has been bleeding on and off since. R upper chest.     Triage Assessment (Adult)       Row Name 04/27/24 3252          Triage Assessment    Airway WDL WDL        Respiratory WDL    Respiratory WDL WDL        Skin Circulation/Temperature WDL    Skin Circulation/Temperature WDL X  R upper chest bleeding from CVC removal 2 days ago        Cardiac WDL    Cardiac WDL WDL        Peripheral/Neurovascular WDL    Peripheral Neurovascular WDL WDL        Cognitive/Neuro/Behavioral WDL    Cognitive/Neuro/Behavioral WDL WDL

## 2024-04-27 NOTE — CONSULTS
Vascular Surgery Consultation    Blanco Osborne MRN# [id number]   Age: 58 year old YOB: 1965     Date of Admission:  4/27/2024    Date of Consult:   04/27/24    Reason for consult: Oozing from tunneled dialysis catheter site       Requesting service: Emergency medicine; requesting provider: Dr. Chad Trierweiler                   Assessment and Plan:   58-year-old with paroxysmal A-fib on Eliquis, ESRD on HD via left upper extremity proximal radial to cephalic fistula, recent removal of right IJ tunneled catheter, presents to ED with slow oozing from the tunneled catheter tract since removal 2 days ago.    -Pressure dressing placed at bedside, leave this in place unless completely saturated  -He has been admitted for observation overnight  -Continue holding Eliquis  -No plans for operative intervention, okay for diet tonight  -Note the outflow vein stenosis seen on fistula duplex last week.  He will work with our office to schedule outpatient fistula revision with vein patch venoplasty in the coming weeks.      Discussed with staff, Dr. Lr.    Alban Carvajal MD         History of Present Illness:   Mr. Osborne is a 58-year-old male with a history of end-stage renal disease on hemodialysis via left upper arm proximal radial artery to cephalic vein fistula (HD MWF), status post liver transplant, currently being worked up for possible kidney transplant, paroxysmal A-fib on Eliquis, who presents to the ED with persistent oozing from his prior right IJ tunneled dialysis catheter tract.    Left upper arm fistula has required several revisions for pseudoaneurysms and outflow stenosis, most recently 1/3/2024 with cephalic vein patch to stenotic outflow vein.  He had a previously been dialyzing through a right IJ tunneled cath which was removed on 4/25/2024 in vascular surgery clinic with Dr. Mcneil.  Last took Eliquis morning of 4/25.  Blanco tells me the oozing began immediately after catheter removal.   Bleeding is no worse but has not stopped and thus he was referred to the ED by our triage line.         Past Medical History:     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    Cancer (H) 2023    Squamous Cell Cancer- Since resolved    Cirrhosis of liver with ascites (H) 02/11/2016    Coagulopathy (H24)     Critical illness myopathy     Dialysis patient (H24)     DIALYSIS 3X/ WEEK    Embolic stroke (H) 11/20/2022    Encephalopathy     ESRD (end stage renal disease) on dialysis (H)     Gastroesophageal reflux disease     H/O alcohol abuse     History of blood transfusion     Hyperammonemia (H24)     Hyperlipidemia     Hypertension     Patients states that HTN has been resolved    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    Insomnia     Lactic acidosis 11/12/2022    Liver replaced by transplant (H) 09/20/2016    NAFLD (nonalcoholic fatty liver disease) 02/11/2016    CHRISTIAN on CPAP     Parainfluenza type 1 infection 11/19/2022    Parapneumonic effusion     Pleural effusion     Pneumothorax on left 12/16/2022    Respiratory acidosis 11/12/2022    Respiratory arrest (H) 11/12/2022    Restless legs syndrome (RLS)     SBP (spontaneous bacterial peritonitis) (H)     MNGI    Seizures (H) 12/2022    Sepsis due to Streptococcus pneumoniae with acute hypoxic respiratory failure (H) 11/19/2022    Stroke, embolic (H) 11/20/2022    Sudden cardiac arrest (H) 12/29/2022    PEA- 2 min of CPR    Tubular adenoma 10/2019    Large cecal adenoma- due for surveillance colonoscopy in 3 years (10/2022)             Past Surgical History:     Past Surgical History:   Procedure Laterality Date    APPENDECTOMY      BENCH LIVER N/A 09/20/2016    Procedure: BENCH LIVER;  Surgeon: Eleonora  MD Enoc;  Location: [facility] OR    BRONCHOSCOPY FLEXIBLE AND RIGID N/A 12/20/2022    Procedure: BRONCHOSCOPY;  Surgeon: Alena Valenzuela MD;  Location:  GI    COLONOSCOPY  08/06/2013    repeat in 2018    COLONOSCOPY N/A 10/04/2019    Procedure: COLONOSCOPY, WITH POLYPECTOMY AND BIOPSY;  Surgeon: Go Chong MD;  Location: [facility] OR    COLONOSCOPY N/A 3/26/2024    Procedure: COLONOSCOPY, FLEXIBLE, WITH LESION REMOVAL USING SNARE;  Surgeon: Jie Douglas MD;  Location:  GI    CREATE FISTULA ARTERIOVENOUS UPPER EXTREMITY Left 03/21/2023    Procedure: LEFT UPPER EXTREMITY ARTERIOVENOUS FISTULA CREATION. LIGATION OF COMPETING VASCULAR BRANCHES- LEFT;  Surgeon: Deejay Mcneil MD;  Location:  OR    CV CORONARY ANGIOGRAM N/A 2/22/2024    Procedure: Coronary Angiogram;  Surgeon: Nima Dimas MD;  Location:  [facility]ATH [facility]    CV PERICARDIOCENTESIS N/A 9/29/2023    Procedure: Pericardiocentesis;  Surgeon: Barry Mckeon MD;  Location:  [facility]ATH [facility]    CV PERICARDIOCENTESIS N/A 10/11/2023    Procedure: Pericardiocentesis;  Surgeon: Ulises Bhakta MD;  Location:  [facility]    CV RIGHT HEART CATH MEASUREMENTS RECORDED N/A 10/11/2023    Procedure: Right Heart Catheterization;  Surgeon: Ulises Bhakta MD;  Location:  [facility] CATH [facility]    HERNIA REPAIR      IR CHEST TUBE PLACEMENT NON-TUNNELED RIGHT  12/12/2022    IR CVC TUNNEL PLACEMENT > 5 YRS OF AGE  12/06/2022    IR CVC TUNNEL PLACEMENT > 5 YRS OF AGE  1/3/2024    IR DIALYSIS FISTULOGRAM LEFT  07/13/2023    IR GASTROSTOMY TUBE CHANGE  02/08/2023    IR GASTROSTOMY TUBE PERCUTANEOUS PLCMNT  11/29/2022    IR PARACENTESIS  11/29/2022    IR PARACENTESIS  12/01/2022    IR PARACENTESIS  2/10/2016    IR PARACENTESIS  2/28/2016    IR THORACENTESIS  12/06/2022    IR THORACENTESIS  09/01/2020    IR THORACENTESIS  08/10/2020    IR TRANSCATHETER BIOPSY  12/01/2022    IR TRANSCATHETER  BIOPSY  2024    REVISION FISTULA ARTERIOVENOUS UPPER EXTREMITY Left 8/15/2023    Procedure: 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 2; OUTFLOW REVISION CEPHALIC TO BRACHIAL VEIN;  Surgeon: Deejay Mcneil MD;  Location: [facility] OR    REVISION FISTULA ARTERIOVENOUS UPPER EXTREMITY Left 1/3/2024    Procedure: Excision and repair of LEFT brachiocephalic arteriovenous fistula and vein patch angioplasty;  Surgeon: Deejay Mcneil MD;  Location: [facility] OR    TRACHEOSTOMY N/A 2022    Procedure: TRACHEOSTOMY;  Surgeon: Nilesh Jackson MD;  Location: [facility] OR    TRANSPLANT LIVER RECIPIENT  DONOR N/A 2016    Procedure: TRANSPLANT LIVER RECIPIENT  DONOR;  Surgeon: Enoc Crews MD;  Location: [facility] OR             Social History:     Social History     Tobacco Use    Smoking status: Never    Smokeless tobacco: Never   Substance Use Topics    Alcohol use: Not Currently     Alcohol/week: 0.0 standard drinks of alcohol             Family History:     Family History   Problem Relation Age of Onset    Coronary Artery Disease No family hx of     Cardiomyopathy No family hx of     Anesthesia Reaction No family hx of     Deep Vein Thrombosis (DVT) No family hx of                 Allergies:   No Known Allergies          Medications:     No current facility-administered medications for this encounter.     Current Outpatient Medications   Medication Sig Dispense Refill    acetaminophen (TYLENOL) 325 MG tablet Take 2 tablets (650 mg) by mouth every 8 hours as needed for other (For optimal non-opioid multimodal pain management to improve pain control.)      aspirin 81 MG EC tablet Take 1 tablet (81 mg) by mouth daily 90 tablet 3    ELIQUIS ANTICOAGULANT 5 MG tablet Take 1 tablet (5 mg) by mouth 2 times daily 180 tablet 1    gabapentin (NEURONTIN) 100 MG capsule Take 3 capsules (300 mg) by mouth at bedtime (Patient taking differently: Take 300 mg by mouth as needed) 30 capsule 2     hydrOXYzine HCl (ATARAX) 25 MG tablet Take 1 tablet (25 mg) by mouth 3 times daily as needed for itching 90 tablet 1    Lacosamide (VIMPAT) 100 MG TABS tablet Take 1 tablet (100 mg) by mouth every evening 31 tablet 5    lacosamide (VIMPAT) 50 MG TABS tablet Take 1 tablet (50 mg) by mouth every morning 93 tablet 3    melatonin 3 MG tablet Take 1 tablet (3 mg) by mouth At Bedtime (Patient taking differently: Take 3 mg by mouth nightly as needed) 90 tablet 1    midodrine (PROAMATINE) 10 MG tablet TAKE 1 TABLET 3 X DAILY. ON DIALYSIS DAYS(M, W, F) TAKE 2 EXTRA TABLETS 1 HOUR BEFORE, 1 TABLET WHEN YOU ARRIVE, AND 1 TABLET DURING DIALYSIS 450 tablet 1    multivitamin RENAL (TRIPHROCAPS) 1 capsule capsule Take 1 capsule by mouth daily 90 capsule 3    oxyCODONE (ROXICODONE) 5 MG tablet Take 0.5-1 tablets (2.5-5 mg) by mouth every 6 hours as needed for severe pain 15 tablet 0    rOPINIRole (REQUIP) 0.5 MG tablet Take 0.5 mg by mouth as needed      sevelamer carbonate (RENVELA) 800 MG tablet Take 1 tablet (800 mg) by mouth 4 times daily With meals and snacks (Patient taking differently: Take 800 mg by mouth as needed With meals and snacks) 120 tablet 0    tacrolimus (GENERIC) 0.5 MG capsule Take 1 capsule (0.5 mg) by mouth 2 times daily 180 capsule 0    traZODone (DESYREL) 50 MG tablet Take 1 tablet (50 mg) by mouth at bedtime (Patient taking differently: Take 50 mg by mouth nightly as needed) 90 tablet 1               Review of Systems:   A 12 point review of systems was completed and found to be negative unless otherwise stated in the above HPI          Physical Exam:   /78   Pulse 74   Temp 98.3  F (36.8  C) (Temporal)   Resp 20   Wt 88.5 kg (195 lb)   SpO2 98%   BMI 26.45 kg/m      No intake or output data in the 24 hours ending 04/27/24 6185      Healthy adult male sitting comfortably in exam.  Nonlabored breathing on room air  Small amount of venous oozing from the old incision for right sided tunneled  "dialysis catheter in the right chest.  Nylon sutures intact.  Small adjacent hematoma.  Palpable right radial pulse  Right neck without edema or swelling.  Old ecchymosis across the chest wall  Pulsatile thrill and palpable pulse in the left upper arm fistula.  No ischemic changes in the hand.                Data:   All laboratory data reviewed    Results:  BMPNo lab results found in last 7 days.  CBC  Recent Labs   Lab 04/27/24  1805   HGB 9.3*     LFT  Recent Labs   Lab 04/27/24  1805   INR 1.23*     No results for input(s): \"GLC\", \"BGM\" in the last 168 hours.    Imaging:  Fistula duplex performed 4/25/2024 demonstrates outflow velocities of up to 1081 mL per minute.  Grayscale imaging shows a focal area of stenosis in the outflow vein, as narrowest 2.1 mm compared to 11 mm proximally and 8 mm distally.  Peak systolic velocities are as high as 675 cm/s at the site with a velocity ratio of around 6.           "

## 2024-04-28 VITALS
OXYGEN SATURATION: 99 % | WEIGHT: 195 LBS | SYSTOLIC BLOOD PRESSURE: 106 MMHG | DIASTOLIC BLOOD PRESSURE: 66 MMHG | HEART RATE: 62 BPM | TEMPERATURE: 98 F | RESPIRATION RATE: 18 BRPM | BODY MASS INDEX: 26.45 KG/M2

## 2024-04-28 LAB
ANION GAP SERPL CALCULATED.3IONS-SCNC: 19 MMOL/L (ref 7–15)
BUN SERPL-MCNC: 65.1 MG/DL (ref 8–23)
CALCIUM SERPL-MCNC: 8.7 MG/DL (ref 8.6–10)
CHLORIDE SERPL-SCNC: 99 MMOL/L (ref 98–107)
CREAT SERPL-MCNC: 8.05 MG/DL (ref 0.67–1.17)
DEPRECATED HCO3 PLAS-SCNC: 22 MMOL/L (ref 22–29)
EGFRCR SERPLBLD CKD-EPI 2021: 7 ML/MIN/1.73M2
ERYTHROCYTE [DISTWIDTH] IN BLOOD BY AUTOMATED COUNT: 14.7 % (ref 10–15)
GLUCOSE SERPL-MCNC: 92 MG/DL (ref 70–99)
HCT VFR BLD AUTO: 25.2 % (ref 40–53)
HGB BLD-MCNC: 8.2 G/DL (ref 13.3–17.7)
MCH RBC QN AUTO: 31.9 PG (ref 26.5–33)
MCHC RBC AUTO-ENTMCNC: 32.5 G/DL (ref 31.5–36.5)
MCV RBC AUTO: 98 FL (ref 78–100)
PLATELET # BLD AUTO: 51 10E3/UL (ref 150–450)
POTASSIUM SERPL-SCNC: 3.9 MMOL/L (ref 3.4–5.3)
PROTOCOL CUTOFF: NORMAL
RBC # BLD AUTO: 2.57 10E6/UL (ref 4.4–5.9)
SA 1  COMMENTS: NORMAL
SA 1 CELL: NORMAL
SA 1 TEST METHOD: NORMAL
SA 2 CELL: NORMAL
SA 2 COMMENTS: NORMAL
SA 2 TEST METHOD: NORMAL
SA1 HI RISK ABY: NORMAL
SA1 MOD RISK ABY: NORMAL
SA2 HI RISK ABY: NORMAL
SA2 MOD RISK ABY: NORMAL
SODIUM SERPL-SCNC: 140 MMOL/L (ref 135–145)
UNACCEPTABLE ANTIGENS: NORMAL
UNOS CPRA: 62
WBC # BLD AUTO: 2.3 10E3/UL (ref 4–11)

## 2024-04-28 PROCEDURE — 250N000012 HC RX MED GY IP 250 OP 636 PS 637: Performed by: INTERNAL MEDICINE

## 2024-04-28 PROCEDURE — G0378 HOSPITAL OBSERVATION PER HR: HCPCS

## 2024-04-28 PROCEDURE — 85027 COMPLETE CBC AUTOMATED: CPT | Performed by: INTERNAL MEDICINE

## 2024-04-28 PROCEDURE — 99024 POSTOP FOLLOW-UP VISIT: CPT | Performed by: SURGERY

## 2024-04-28 PROCEDURE — 99239 HOSP IP/OBS DSCHRG MGMT >30: CPT | Performed by: HOSPITALIST

## 2024-04-28 PROCEDURE — 80048 BASIC METABOLIC PNL TOTAL CA: CPT | Performed by: INTERNAL MEDICINE

## 2024-04-28 PROCEDURE — 36415 COLL VENOUS BLD VENIPUNCTURE: CPT | Performed by: INTERNAL MEDICINE

## 2024-04-28 PROCEDURE — 250N000013 HC RX MED GY IP 250 OP 250 PS 637: Performed by: INTERNAL MEDICINE

## 2024-04-28 RX ORDER — NALOXONE HYDROCHLORIDE 0.4 MG/ML
0.2 INJECTION, SOLUTION INTRAMUSCULAR; INTRAVENOUS; SUBCUTANEOUS
Status: DISCONTINUED | OUTPATIENT
Start: 2024-04-28 | End: 2024-04-28 | Stop reason: HOSPADM

## 2024-04-28 RX ORDER — NALOXONE HYDROCHLORIDE 0.4 MG/ML
0.4 INJECTION, SOLUTION INTRAMUSCULAR; INTRAVENOUS; SUBCUTANEOUS
Status: DISCONTINUED | OUTPATIENT
Start: 2024-04-28 | End: 2024-04-28 | Stop reason: HOSPADM

## 2024-04-28 RX ADMIN — ROPINIROLE HYDROCHLORIDE 0.5 MG: 0.5 TABLET, FILM COATED ORAL at 00:54

## 2024-04-28 RX ADMIN — SEVELAMER CARBONATE 800 MG: 800 TABLET, FILM COATED ORAL at 12:02

## 2024-04-28 RX ADMIN — TACROLIMUS 0.5 MG: 0.5 CAPSULE ORAL at 08:28

## 2024-04-28 RX ADMIN — HYDROXYZINE HYDROCHLORIDE 25 MG: 25 TABLET, FILM COATED ORAL at 08:28

## 2024-04-28 RX ADMIN — OXYCODONE HYDROCHLORIDE 5 MG: 5 TABLET ORAL at 09:02

## 2024-04-28 RX ADMIN — LACOSAMIDE 50 MG: 50 TABLET, FILM COATED ORAL at 08:28

## 2024-04-28 RX ADMIN — SEVELAMER CARBONATE 800 MG: 800 TABLET, FILM COATED ORAL at 08:29

## 2024-04-28 RX ADMIN — Medication 1 CAPSULE: at 08:29

## 2024-04-28 RX ADMIN — OXYCODONE HYDROCHLORIDE 5 MG: 5 TABLET ORAL at 00:54

## 2024-04-28 ASSESSMENT — ACTIVITIES OF DAILY LIVING (ADL)
ADLS_ACUITY_SCORE: 37

## 2024-04-28 NOTE — PLAN OF CARE
RECEIVING UNIT ED HANDOFF REVIEW    ED Nurse Handoff Report was reviewed by: Roberta Peace RN on April 27, 2024 at 9:48 PM

## 2024-04-28 NOTE — PROGRESS NOTES
VASCULAR SURGERY    Comfortable this morning.  No further bleeding from tunneled catheter tract following removal last week.    Did have a small amount of bleeding from the site closed with nylon sutures following right jugular tunneled cath removal by myself in the clinic on 4/25/2024.  Patient noted more bleeding on dialysis run yesterday.  They put hemostatic agent on the exit site but apparently no pressure dressing.    Was seen last evening by Dr. Carvajal.  Pressure dressing applied and this has been successful.    This morning he has some bruising but no large hematoma or ongoing bleeding from the site.  Dressings were changed.    Has a good pulse within the fistula which is working well.  Does have the stenosis as mentioned in my office visit that will require eventually vein patch angioplasty.    Note patient is on chronic Eliquis that he may continue along with baby aspirin      Deejay Mcneil MD

## 2024-04-28 NOTE — PROGRESS NOTES
Summary:  ED on 4/27/2024 with continued oozing from the tunneled catheter site     Hx of ESRD on HD M/W/F who just had his CVC removed 2 days prior to presentation, atrial fibrillation normally on Eliquis, cirrhosis s/p transplant and seizures  Update: Patient seen by vascular surgery this morning, provider changed dressing to right chest, said patient is okay to discharge. Diet ordered, patient had 100% of breakfast and tolerated.     Orientation: AOX4  Observation Goals (met & not met): not met  Activity Level: SBA, IND  Fall Risk: yes  Behavior & Aggression Tool Color: green  Pain Management: c/o to the R upper chest previous catheter site, prn OXY given 1  ABNL VS/O2: VSS RA  ABNL Lab/BG: hgb 9.3  Diet: Renal    Bowel/Bladder: continent BB. Last BM yesterday   Drains/Devices: PIV SL  Skin: Bruises to chest, healing hematoma to  R upper arm and L fistula   Tests/Procedures for next shift: none   Anticipated DC date: 4/28   Other Important Info:

## 2024-04-28 NOTE — PHARMACY-ADMISSION MEDICATION HISTORY
Pharmacist Admission Medication History    Admission medication history is complete. The information provided in this note is only as accurate as the sources available at the time of the update.    Information Source(s): Patient and CareEverywhere/SureScripts via in-person    Pertinent Information:   Patient states Eliquis has been on hold for the past two days (last dose 4/25)     Changes made to PTA medication list:  Added: None  Deleted: None  Changed: hydroxyzine, midodrine, ropinirole, sevelamer    Allergies reviewed with patient and updates made in EHR: unable to assess    Medication History Completed By: CALVIN GARCIA [facility] 4/27/2024 7:05 PM    PTA Med List   Medication Sig Last Dose    aspirin 81 MG EC tablet Take 1 tablet (81 mg) by mouth daily 4/27/2024    gabapentin (NEURONTIN) 100 MG capsule Take 3 capsules (300 mg) by mouth at bedtime (Patient taking differently: Take 300 mg by mouth nightly as needed) Past Week    hydrOXYzine HCl (ATARAX) 25 MG tablet Take 1 tablet (25 mg) by mouth 3 times daily as needed for itching (Patient taking differently: Take 25 mg by mouth 3 times daily as needed for itching Take 25 mg in the morning and 50 mg at bedtime) 4/27/2024    Lacosamide (VIMPAT) 100 MG TABS tablet Take 1 tablet (100 mg) by mouth every evening 4/26/2024    lacosamide (VIMPAT) 50 MG TABS tablet Take 1 tablet (50 mg) by mouth every morning 4/27/2024    melatonin 3 MG tablet Take 1 tablet (3 mg) by mouth At Bedtime (Patient taking differently: Take 3 mg by mouth nightly as needed) 4/26/2024    midodrine (PROAMATINE) 10 MG tablet TAKE 1 TABLET 3 X DAILY. ON DIALYSIS DAYS(M, W, F) TAKE 2 EXTRA TABLETS 1 HOUR BEFORE, 1 TABLET WHEN YOU ARRIVE, AND 1 TABLET DURING DIALYSIS (Patient taking differently: Take 10 mg by mouth 3 times daily as needed) Past Week    multivitamin RENAL (TRIPHROCAPS) 1 capsule capsule Take 1 capsule by mouth daily 4/27/2024    oxyCODONE (ROXICODONE) 5 MG tablet Take 0.5-1 tablets  (2.5-5 mg) by mouth every 6 hours as needed for severe pain Past Week    rOPINIRole (REQUIP) 0.5 MG tablet Take 0.5 mg by mouth 2 times daily as needed Past Week    sevelamer carbonate (RENVELA) 800 MG tablet Take 1 tablet (800 mg) by mouth 4 times daily With meals and snacks (Patient taking differently: Take 800 mg by mouth 3 times daily (with meals)) 4/27/2024    tacrolimus (GENERIC) 0.5 MG capsule Take 1 capsule (0.5 mg) by mouth 2 times daily 4/27/2024    traZODone (DESYREL) 50 MG tablet Take 1 tablet (50 mg) by mouth at bedtime (Patient taking differently: Take 50 mg by mouth nightly as needed) Past Week

## 2024-04-28 NOTE — PROGRESS NOTES
Observation goals  PRIOR TO DISCHARGE       Comments: -diagnostic tests and consults completed and resulted not met   -vital signs normal or at patient baseline met   Nurse to notify provider when observation goals have been met and patient is ready for discharge.

## 2024-04-28 NOTE — DISCHARGE SUMMARY
[facility]  Hospitalist Discharge Summary      Date of Admission:  4/27/2024  Date of Discharge:  4/28/2024  Discharging Provider: Alysha Magaña MD  Discharge Service: Hospitalist Service    Discharge Diagnoses   Bleeding after removal of tunneled catheter   Acute blood loss anemia    Clinically Significant Risk Factors     # Overweight: Estimated body mass index is 26.45 kg/m  as calculated from the following:    Height as of 4/3/24: 1.829 m (6').    Weight as of this encounter: 88.5 kg (195 lb).       Follow-ups Needed After Discharge   Follow-up Appointments     Follow-up and recommended labs and tests       Follow up with primary care provider, Ki aCrter, within 7 days   for hospital follow- up.  No follow up labs or test are needed.        {    Unresulted Labs Ordered in the Past 30 Days of this Admission       No orders found for last 31 day(s).        These results will be followed up by     Discharge Disposition   Discharged to home  Condition at discharge: Stable    Hospital Course      Blanco Osborne is a 58 year old male with ESRD on HD M/W/F who just had his CVC removed 2 days prior to presentation, atrial fibrillation normally on Eliquis, cirrhosis s/p transplant and seizures who presented to the ED on 4/27/2024 with continued oozing from the tunneled catheter site     Oozing from tunneled catheter site  Acute blood loss anemia  Patient had his tunneled CVC catheter removed on Thursday.  Since then he has had a slow ooze from the site.  Of note, he states he accidentally took his Eliquis the morning of the procedure but has not taken it since.  Vascular surgery came to the ED and placed a pressure dressing and recommended observation over night.   Hemoglobin in the ED was 9.3.  Was 11.6 on 4/3/24.  Suspect this is related to the oozing.   - patient was admitted to observation   - pressure dressing applied with resolution of bleeding  - Vascular surgery consulted: input  appreciated. Now that bleeding is Dced, ok to continue PTA eliquis and aspirin.    ESRD  Dialyzes M/W/F      H/o Embolic stroke   H/o Atrial fibrillation   Patient normally on Eliquis.  Took his last dose on Thursday morning, just prior to catheter removal.  Has not taken any since  - Held Eliquis on admission, . Okd to resume on discharge per vascular surgery    Stable chronic medical conditions - PTA Medications as appropriate   Cirrhosis s/p Liver transplant - chronically immunosuppressed (Tacrolimus)   Seizure disorder   GERD   GERD  HTN   HLP   Insomnia     Consultations This Hospital Stay   VASCULAR SURGERY IP CONSULT  CARE MANAGEMENT / SOCIAL WORK IP CONSULT    Code Status   Full Code    Time Spent on this Encounter   I, Alysha Magaña MD, personally saw the patient today and spent greater than 30 minutes discharging this patient.       Alysha Magaña MD  [facility] EXTENDED RECOVERY AND SHORT STAY  [address] [place] [id number]  Phone: [phone]  ______________________________________________________________________    Physical Exam   Vital Signs: Temp: 97.7  F (36.5  C) Temp src: Oral BP: 113/68 Pulse: 62   Resp: 18 SpO2: 97 % O2 Device: None (Room air)    Weight: 195 lbs 0 oz  General Appearance: Well appearing for stated age.  Respiratory: CTAB, no rales or ronchi  Cardiovascular: S1, S2 normal, no murmurs  GI: non-tender on palpation, BS present         Primary Care Physician   Ki Carter    Discharge Orders      Reason for your hospital stay    You were treated     Follow-up and recommended labs and tests     Follow up with primary care provider, Ki Carter, within 7 days for hospital follow- up.  No follow up labs or test are needed.     Activity    Your activity upon discharge: activity as tolerated     Diet    Follow this diet upon discharge: Orders Placed This Encounter      Renal Diet (dialysis)       Significant Results and Procedures   Most Recent 3  CBC's:  Recent Labs   Lab Test 04/28/24  0716 04/27/24  1805 04/03/24  1156   WBC 2.3* 3.1* 4.8   HGB 8.2* 9.3*  9.3* 11.6*   MCV 98 97 98   PLT 51* 71* 102*     Most Recent 3 BMP's:  Recent Labs   Lab Test 04/28/24  0716 04/03/24  1156 02/22/24  0916    135 139   POTASSIUM 3.9 3.7 4.0   CHLORIDE 99 93* 95*   CO2 22 29 28   BUN 65.1* 30.0* 49.2*   CR 8.05* 4.47* 6.16*   ANIONGAP 19* 13 16*   KELLY 8.7 9.3 9.2   GLC 92 102* 97   ,   Results for orders placed or performed during the hospital encounter of 04/25/24   US Ext Arterial Venous Dialys Acs Graft    Narrative    US EXTREMITY ARTERIAL VENOUS DIALYSIS ACCESS GRAFT   4/25/2024 1:43 PM    HISTORY: 58-year-old patient with end-stage renal disease on dialysis.    COMPARISON: February 21, 2024.    TECHNIQUE: Color Doppler and spectral waveform analysis performed  throughout the inflow radial and brachial arteries as well as outflow  basilic vein.    FINDINGS: Inflow brachial artery is 5.4-5.9 mm and proximal radial  artery is 4 mm. Anastomosis is 536/338 cm/second. Diameter beyond the  anastomosis moving up the arm in the fistulized basilic vein is 8.7  mm, 10 mm and 11.2 mm, in the area of stenosis in the mid to upper arm  of 2.1 mm with velocity of 675/494 cm/second, marked on the skin  surface. More central diameters range from 8-9.6 mm. Total blood flow  volume is 1081 mL/minute.      Impression    IMPRESSION:  1. Patent left upper arm fistula. Moderate stenosis in the fistulized  vein in the mid to upper arm.     ABEBA SIMONS MD         SYSTEM ID:  [id number]     *Note: Due to a large number of results and/or encounters for the requested time period, some results have not been displayed. A complete set of results can be found in Results Review.       Discharge Medications   Current Discharge Medication List        CONTINUE these medications which have NOT CHANGED    Details   aspirin 81 MG EC tablet Take 1 tablet (81 mg) by mouth daily  Qty: 90 tablet,  Refills: 3    Associated Diagnoses: Coronary artery disease involving native coronary artery of native heart without angina pectoris      gabapentin (NEURONTIN) 100 MG capsule Take 3 capsules (300 mg) by mouth at bedtime  Qty: 30 capsule, Refills: 2    Associated Diagnoses: Itching      hydrOXYzine HCl (ATARAX) 25 MG tablet Take 1 tablet (25 mg) by mouth 3 times daily as needed for itching  Qty: 90 tablet, Refills: 1    Associated Diagnoses: Cirrhosis of liver with ascites, unspecified hepatic cirrhosis type (H); Restless leg syndrome; Insomnia, unspecified type; Liver transplanted (H)      !! Lacosamide (VIMPAT) 100 MG TABS tablet Take 1 tablet (100 mg) by mouth every evening  Qty: 31 tablet, Refills: 5    Associated Diagnoses: Seizures (H)      !! lacosamide (VIMPAT) 50 MG TABS tablet Take 1 tablet (50 mg) by mouth every morning  Qty: 93 tablet, Refills: 3    Associated Diagnoses: Focal epilepsy (H)      melatonin 3 MG tablet Take 1 tablet (3 mg) by mouth At Bedtime  Qty: 90 tablet, Refills: 1    Associated Diagnoses: Insomnia, unspecified type      midodrine (PROAMATINE) 10 MG tablet TAKE 1 TABLET 3 X DAILY. ON DIALYSIS DAYS(M, W, F) TAKE 2 EXTRA TABLETS 1 HOUR BEFORE, 1 TABLET WHEN YOU ARRIVE, AND 1 TABLET DURING DIALYSIS  Qty: 450 tablet, Refills: 1    Comments: **Patient requests 90 days supply**  Associated Diagnoses: Hypertension, unspecified type      multivitamin RENAL (TRIPHROCAPS) 1 capsule capsule Take 1 capsule by mouth daily  Qty: 90 capsule, Refills: 3    Associated Diagnoses: Restless leg syndrome; ESRD (end stage renal disease) on dialysis (H)      oxyCODONE (ROXICODONE) 5 MG tablet Take 0.5-1 tablets (2.5-5 mg) by mouth every 6 hours as needed for severe pain  Qty: 15 tablet, Refills: 0    Associated Diagnoses: Other specified complication of vascular prosthetic devices, implants and grafts, initial encounter (H24)      rOPINIRole (REQUIP) 0.5 MG tablet Take 0.5 mg by mouth 2 times daily as needed       sevelamer carbonate (RENVELA) 800 MG tablet Take 1 tablet (800 mg) by mouth 4 times daily With meals and snacks  Qty: 120 tablet, Refills: 0    Associated Diagnoses: ESRD (end stage renal disease) on dialysis (H)      tacrolimus (GENERIC) 0.5 MG capsule Take 1 capsule (0.5 mg) by mouth 2 times daily  Qty: 180 capsule, Refills: 0    Associated Diagnoses: Cirrhosis of liver with ascites, unspecified hepatic cirrhosis type (H)      traZODone (DESYREL) 50 MG tablet Take 1 tablet (50 mg) by mouth at bedtime  Qty: 90 tablet, Refills: 1    Associated Diagnoses: Insomnia, unspecified type      acetaminophen (TYLENOL) 325 MG tablet Take 2 tablets (650 mg) by mouth every 8 hours as needed for other (For optimal non-opioid multimodal pain management to improve pain control.)    Associated Diagnoses: Dialysis AV fistula malfunction, sequela      ELIQUIS ANTICOAGULANT 5 MG tablet Take 1 tablet (5 mg) by mouth 2 times daily  Qty: 180 tablet, Refills: 1    Associated Diagnoses: Acquired immunocompromised state (H24)       !! - Potential duplicate medications found. Please discuss with provider.        Allergies   No Known Allergies

## 2024-04-28 NOTE — PLAN OF CARE
Admission/Transfer from: Admit  2 RN skin assessment completed. Yes with Tram Bart who will see about cortisone cream for rash   Significant findings include: Rash on abdomen, bandage on R chest, as noted for admit reason and dryness, healing hematoma on R arm and L fistula   WOC Nurse Consult Ordered? No

## 2024-04-28 NOTE — PLAN OF CARE
Goal Outcome Evaluation:  Summary:  ED on 4/27/2024 with continued oozing from the tunneled catheter site     Hx of ESRD on HD M/W/F who just had his CVC removed 2 days prior to presentation, atrial fibrillation normally on Eliquis, cirrhosis s/p transplant and seizures    4/27/2024 [id number]  Orientation: AOX4  Observation Goals (met & not met): not met  Activity Level: SBA, IND  Fall Risk: yes  Behavior & Aggression Tool Color: green  Pain Management: c/o to the R upper chest previous catheter site, prn OXY given 1  ABNL VS/O2: VSS RA  ABNL Lab/BG: hgb 9.3  Diet: NPO at midnight   Bowel/Bladder: continent BB. Last BM yesterday   Drains/Devices: PIV SL  Skin: Rash on abdomen, bandage on R chest, as noted for admit reason and dryness, healing hematoma on R arm and L fistula   Tests/Procedures for next shift: none   Anticipated DC date: possible today?   Other Important Info: fistula to the L am, denies SOB or N/V, site WDL no bleeding notes- see patient care ordered

## 2024-04-28 NOTE — CONSULTS
Care Management Discharge Note    Discharge Date: 04/28/2024       Discharge Disposition:      Discharge Services:      Discharge DME:      Discharge Transportation:      Private pay costs discussed: Not applicable    Does the patient's insurance plan have a 3 day qualifying hospital stay waiver?  Yes     Which insurance plan 3 day waiver is available? Alternative insurance waiver    Will the waiver be used for post-acute placement? No    PAS Confirmation Code:    Patient/family educated on Medicare website which has current facility and service quality ratings:      Education Provided on the Discharge Plan:    Persons Notified of Discharge Plans: patient  Patient/Family in Agreement with the Plan:      Handoff Referral Completed: Yes    Additional Information:  Pt to discharge home.  Patient is independent with all A/IADLs.  Patient will arrange follow up with PCP himself.    Livia Meade RN Care Coordinator  [facility]  [phone]

## 2024-04-28 NOTE — PLAN OF CARE
Goal Outcome Evaluation:                          Observation goals  PRIOR TO DISCHARGE       Comments: -diagnostic tests and consults completed and resulted not met   -vital signs normal or at patient baseline met   Nurse to notify provider when observation goals have been met and patient is ready for discharge.    4/27/2024 [id number]    Summary/ HX  ESRD on HD M/W/F who just had his CVC removed 2 days prior to presentation, atrial fibrillation normally on Eliquis, cirrhosis s/p transplant and seizures who presented to the ED on 4/27/2024 with continued oozing from the tunneled catheter site     Orientation A & O x 4    Vitals/Tele VSS on RM air     Pain, received pain meds in ED, gave scheduled atrax     IV Access/drains PIV S Left fistula L    Diet Renal, NPO midnight    Mobility SBA    GI/ Continent     Wound/Skin  R port site bandaged with tape, CDI, abdominal rash, dry feet    Consults vascular     Discharge Plan pending     See Flow sheets for assessment

## 2024-04-28 NOTE — PLAN OF CARE
Goal Outcome Evaluation:       Patient has been discharged April 28, 2024 12:52 PM. Discharge instructions and education reviewed, medication schedule and follow up appts discussed. Pt had no questions. All belongings sent with pt.

## 2024-04-28 NOTE — H&P
[facility]    History and Physical - Hospitalist Service       Date of Admission:  4/27/2024    Assessment & Plan      Blanco Osborne is a 58 year old male with ESRD on HD M/W/F who just had his CVC removed 2 days prior to presentation, atrial fibrillation normally on Eliquis, cirrhosis s/p transplant and seizures who presented to the ED on 4/27/2024 with continued oozing from the tunneled catheter site     Oozing from tunneled catheter site  Acute blood loss anemia  Patient had his tunneled CVC catheter removed on Thursday.  Since then he has had a slow ooze from the site.  Of note, he states he accidentally took his Eliquis the morning of the procedure but has not taken it since.  Vascular surgery came to the ED and placed a pressure dressing and recommended observation over night.   Hemoglobin in the ED was 9.3.  Was 11.6 on 4/3/24.  Suspect this is related to the oozing.   - Admit to observation   - Continue pressure dressing  - Repeat CBC in AM   - Vascular surgery consulted     ESRD  Dialyzes M/W/F  - If not discharging tomorrow will need to consult nephrology for possible dialysis     H/o Embolic stroke   H/o Atrial fibrillation   Patient normally on Eliquis.  Took his last dose on Thursday morning, just prior to catheter removal.  Has not taken any since  - Holding Eliquis for now.  Defer to vascular surgery when to resume     Stable chronic medical conditions - PTA Medications as appropriate   Cirrhosis s/p Liver transplant - chronically immunosuppressed (Tacrolimus)   Seizure disorder   GERD   GERD  HTN   HLP   Insomnia         Diet:  Renal diet   DVT Prophylaxis: Pneumatic Compression Devices  Nixon Catheter: Not present  Lines: PRESENT             Cardiac Monitoring: None  Code Status:  Full code    Clinically Significant Risk Factors Present on Admission               # Drug Induced Coagulation Defect: home medication list includes an anticoagulant medication  # Drug Induced  Platelet Defect: home medication list includes an antiplatelet medication   # Hypertension: Noted on problem list      # Overweight: Estimated body mass index is 26.45 kg/m  as calculated from the following:    Height as of 4/3/24: 1.829 m (6').    Weight as of this encounter: 88.5 kg (195 lb).       # Financial/Environmental Concerns:           Disposition Plan     Medically Ready for Discharge: Anticipated Tomorrow           David Rodríguez DO  Hospitalist Service  [facility]  Securely message with [employer] (more info)  Text page via [employer] Paging/Directory     ______________________________________________________________________    Chief Complaint   Bleeding from catheter removal site    History is obtained from the patient    History of Present Illness   Blanco Osborne is a 58 year old male with ESRD on HD M/W/F who just had his CVC removed 2 days prior to presentation, atrial fibrillation normally on Eliquis, cirrhosis s/p transplant and seizures who presented to the ED on 4/27/2024 with continued oozing from the tunneled catheter site.  Patient had his catheter removed on Thursday and since then he has had a slow ooze from the site.  Of note, he states he accidentally took his Eliquis the morning of the procedure but has not taken it since.   Denies any other complaints.  No fevers, chills, cough, sore throat, chest pain, SOB, abdominal pain, N/V/D      Past Medical History    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    Cancer (H) 2023    Squamous Cell Cancer- Since resolved    Cirrhosis of liver with ascites (H) 02/11/2016    Coagulopathy (H24)     Critical illness myopathy     Dialysis patient (H24)     DIALYSIS 3X/ WEEK    Embolic  stroke (H) 11/20/2022    Encephalopathy     ESRD (end stage renal disease) on dialysis (H)     Gastroesophageal reflux disease     H/O alcohol abuse     History of blood transfusion     Hyperammonemia (H24)     Hyperlipidemia     Hypertension     Patients states that HTN has been resolved    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    Insomnia     Lactic acidosis 11/12/2022    Liver replaced by transplant (H) 09/20/2016    NAFLD (nonalcoholic fatty liver disease) 02/11/2016    CHRISTIAN on CPAP     Parainfluenza type 1 infection 11/19/2022    Parapneumonic effusion     Pleural effusion     Pneumothorax on left 12/16/2022    Respiratory acidosis 11/12/2022    Respiratory arrest (H) 11/12/2022    Restless legs syndrome (RLS)     SBP (spontaneous bacterial peritonitis) (H)     MNGI    Seizures (H) 12/2022    Sepsis due to Streptococcus pneumoniae with acute hypoxic respiratory failure (H) 11/19/2022    Stroke, embolic (H) 11/20/2022    Sudden cardiac arrest (H) 12/29/2022    PEA- 2 min of CPR    Tubular adenoma 10/2019    Large cecal adenoma- due for surveillance colonoscopy in 3 years (10/2022)       Past Surgical History   Past Surgical History:   Procedure Laterality Date    APPENDECTOMY      BENCH LIVER N/A 09/20/2016    Procedure: BENCH LIVER;  Surgeon: Enoc Crews MD;  Location: [facility] OR    BRONCHOSCOPY FLEXIBLE AND RIGID N/A 12/20/2022    Procedure: BRONCHOSCOPY;  Surgeon: Alena Valenzuela MD;  Location:  GI    COLONOSCOPY  08/06/2013    repeat in 2018    COLONOSCOPY N/A 10/04/2019    Procedure: COLONOSCOPY, WITH POLYPECTOMY AND BIOPSY;  Surgeon: Go Chong MD;  Location: [facility] OR    COLONOSCOPY N/A 3/26/2024    Procedure: COLONOSCOPY, FLEXIBLE, WITH LESION REMOVAL USING SNARE;  Surgeon: Jie Douglas MD;  Location:  GI    CREATE FISTULA ARTERIOVENOUS UPPER EXTREMITY Left 03/21/2023    Procedure: LEFT UPPER EXTREMITY ARTERIOVENOUS FISTULA CREATION. LIGATION OF COMPETING VASCULAR  BRANCHES- LEFT;  Surgeon: Deejay Mcneil MD;  Location:  OR    CV CORONARY ANGIOGRAM N/A 2024    Procedure: Coronary Angiogram;  Surgeon: Nima Dimas MD;  Location:  HEART CARDIAC CATH LAB    CV PERICARDIOCENTESIS N/A 2023    Procedure: Pericardiocentesis;  Surgeon: Barry Mckeon MD;  Location:  HEART CARDIAC CATH LAB    CV PERICARDIOCENTESIS N/A 10/11/2023    Procedure: Pericardiocentesis;  Surgeon: Ulises Bhakta MD;  Location:  HEART CARDIAC CATH LAB    CV RIGHT HEART CATH MEASUREMENTS RECORDED N/A 10/11/2023    Procedure: Right Heart Catheterization;  Surgeon: Ulises Bhakta MD;  Location:  HEART CARDIAC CATH LAB    HERNIA REPAIR      IR CHEST TUBE PLACEMENT NON-TUNNELED RIGHT  2022    IR CVC TUNNEL PLACEMENT > 5 YRS OF AGE  2022    IR CVC TUNNEL PLACEMENT > 5 YRS OF AGE  1/3/2024    IR DIALYSIS FISTULOGRAM LEFT  2023    IR GASTROSTOMY TUBE CHANGE  2023    IR GASTROSTOMY TUBE PERCUTANEOUS PLCMNT  2022    IR PARACENTESIS  2022    IR PARACENTESIS  2022    IR PARACENTESIS  2/10/2016    IR PARACENTESIS  2016    IR THORACENTESIS  2022    IR THORACENTESIS  2020    IR THORACENTESIS  08/10/2020    IR TRANSCATHETER BIOPSY  2022    IR TRANSCATHETER BIOPSY  2024    REVISION FISTULA ARTERIOVENOUS UPPER EXTREMITY Left 8/15/2023    Procedure: 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 2; OUTFLOW REVISION CEPHALIC TO BRACHIAL VEIN;  Surgeon: Deejay Mcneil MD;  Location:  OR    REVISION FISTULA ARTERIOVENOUS UPPER EXTREMITY Left 1/3/2024    Procedure: Excision and repair of LEFT brachiocephalic arteriovenous fistula and vein patch angioplasty;  Surgeon: Deejay Mcneil MD;  Location:  OR    TRACHEOSTOMY N/A 2022    Procedure: TRACHEOSTOMY;  Surgeon: Nilesh Jackson MD;  Location:  OR    TRANSPLANT LIVER RECIPIENT  DONOR N/A 2016    Procedure:  TRANSPLANT LIVER RECIPIENT  DONOR;  Surgeon: Enoc Crews MD;  Location: [facility] OR       Prior to Admission Medications   Prior to Admission Medications   Prescriptions Last Dose Informant Patient Reported? Taking?   ELIQUIS ANTICOAGULANT 5 MG tablet 2024  No No   Sig: Take 1 tablet (5 mg) by mouth 2 times daily   Lacosamide (VIMPAT) 100 MG TABS tablet 2024 Spouse/Significant Other No Yes   Sig: Take 1 tablet (100 mg) by mouth every evening   acetaminophen (TYLENOL) 325 MG tablet   Yes No   Sig: Take 2 tablets (650 mg) by mouth every 8 hours as needed for other (For optimal non-opioid multimodal pain management to improve pain control.)   aspirin 81 MG EC tablet 2024  No Yes   Sig: Take 1 tablet (81 mg) by mouth daily   gabapentin (NEURONTIN) 100 MG capsule Past Week  No Yes   Sig: Take 3 capsules (300 mg) by mouth at bedtime   Patient taking differently: Take 300 mg by mouth nightly as needed   hydrOXYzine HCl (ATARAX) 25 MG tablet 2024  No Yes   Sig: Take 1 tablet (25 mg) by mouth 3 times daily as needed for itching   Patient taking differently: Take 25 mg by mouth 3 times daily as needed for itching Take 25 mg in the morning and 50 mg at bedtime   lacosamide (VIMPAT) 50 MG TABS tablet 2024  No Yes   Sig: Take 1 tablet (50 mg) by mouth every morning   melatonin 3 MG tablet 2024 Spouse/Significant Other No Yes   Sig: Take 1 tablet (3 mg) by mouth At Bedtime   Patient taking differently: Take 3 mg by mouth nightly as needed   midodrine (PROAMATINE) 10 MG tablet Past Week  No Yes   Sig: TAKE 1 TABLET 3 X DAILY. ON DIALYSIS DAYS(M, W, F) TAKE 2 EXTRA TABLETS 1 HOUR BEFORE, 1 TABLET WHEN YOU ARRIVE, AND 1 TABLET DURING DIALYSIS   Patient taking differently: Take 10 mg by mouth 3 times daily as needed   multivitamin RENAL (TRIPHROCAPS) 1 capsule capsule 2024  No Yes   Sig: Take 1 capsule by mouth daily   oxyCODONE (ROXICODONE) 5 MG tablet Past Week  No Yes   Sig: Take  0.5-1 tablets (2.5-5 mg) by mouth every 6 hours as needed for severe pain   rOPINIRole (REQUIP) 0.5 MG tablet Past Week  Yes Yes   Sig: Take 0.5 mg by mouth 2 times daily as needed   sevelamer carbonate (RENVELA) 800 MG tablet 4/27/2024  No Yes   Sig: Take 1 tablet (800 mg) by mouth 4 times daily With meals and snacks   Patient taking differently: Take 800 mg by mouth 3 times daily (with meals)   tacrolimus (GENERIC) 0.5 MG capsule 4/27/2024  No Yes   Sig: Take 1 capsule (0.5 mg) by mouth 2 times daily   traZODone (DESYREL) 50 MG tablet Past Week  No Yes   Sig: Take 1 tablet (50 mg) by mouth at bedtime   Patient taking differently: Take 50 mg by mouth nightly as needed      Facility-Administered Medications: None        Review of Systems    The 10 point Review of Systems is negative other than noted in the HPI    Allergies   No Known Allergies     Physical Exam   Vital Signs: Temp: 98.3  F (36.8  C) Temp src: Temporal BP: 130/78 Pulse: 74   Resp: 20 SpO2: 98 % O2 Device: None (Room air)    Weight: 195 lbs 0 oz    General Appearance: Resting comfortably in bed. NAD  Eyes: EOMI  HEENT: NC/AT  Respiratory: Clear to auscultation.  No respiratory distress  Cardiovascular: RRR  GI: Soft.  Non-distended   Skin: Pressure dressing on right chest at old catheter site  Musculoskeletal: No edema.  No bony abnormalities  Neurologic: CN appear intact  Psychiatric: Alert and oriented     Medical Decision Making       65 MINUTES SPENT BY ME on the date of service doing chart review, history, exam, documentation & further activities per the note.      Data   ------------------------- PAST 24 HR DATA REVIEWED -----------------------------------------------    I have personally reviewed the following data over the past 24 hrs:    3.1 (L)  \   9.3 (L); 9.3 (L)   / 71 (L)     N/A N/A N/A /  N/A   N/A N/A N/A \     INR:  1.23 (H) PTT:  33   D-dimer:  N/A Fibrinogen:  N/A       Imaging results reviewed over the past 24 hrs:   No results  found for this or any previous visit (from the past 24 hour(s)).

## 2024-04-29 ENCOUNTER — PATIENT OUTREACH (OUTPATIENT)
Dept: CARE COORDINATION | Facility: CLINIC | Age: 59
End: 2024-04-29
Payer: COMMERCIAL

## 2024-04-29 NOTE — PROGRESS NOTES
Clinic Care Coordination Contact  [facility]/Voicemail    Clinical Data: Care Coordinator Outreach    Outreach Documentation Number of Outreach Attempt   4/29/2024   9:06 AM 1     Left message on patient's voicemail with call back information and requested return call.    Plan: Care Coordinator will try to reach patient again in 1-2 business days.     Kelly Rice RN, BSN, PHN  Primary Care / Care Coordinator   [facility]  E-mail [email or web address]   [phone]

## 2024-04-29 NOTE — TELEPHONE ENCOUNTER
LM for patient to call back with any dates to avoid for scheduling his surgery.  Per the surgery form, he prefers to be scheduled the beginning of June.

## 2024-04-30 ENCOUNTER — PATIENT OUTREACH (OUTPATIENT)
Dept: NURSING | Facility: CLINIC | Age: 59
End: 2024-04-30
Payer: COMMERCIAL

## 2024-04-30 ENCOUNTER — VIRTUAL VISIT (OUTPATIENT)
Dept: NEUROLOGY | Facility: CLINIC | Age: 59
End: 2024-04-30
Payer: COMMERCIAL

## 2024-04-30 ENCOUNTER — HOSPITAL ENCOUNTER (OUTPATIENT)
Dept: CARDIAC REHAB | Facility: CLINIC | Age: 59
Discharge: HOME | End: 2024-04-30
Attending: STUDENT IN AN ORGANIZED HEALTH CARE EDUCATION/TRAINING PROGRAM
Payer: COMMERCIAL

## 2024-04-30 VITALS
SYSTOLIC BLOOD PRESSURE: 107 MMHG | TEMPERATURE: 97.7 F | HEART RATE: 73 BPM | OXYGEN SATURATION: 98 % | DIASTOLIC BLOOD PRESSURE: 65 MMHG

## 2024-04-30 DIAGNOSIS — G40.109 FOCAL EPILEPSY (H): ICD-10-CM

## 2024-04-30 PROCEDURE — 93798 PHYS/QHP OP CAR RHAB W/ECG: CPT | Performed by: REHABILITATION PRACTITIONER

## 2024-04-30 RX ORDER — LACOSAMIDE 50 MG/1
50 TABLET ORAL 2 TIMES DAILY
Qty: 186 TABLET | Refills: 1 | Status: SHIPPED | OUTPATIENT
Start: 2024-04-30

## 2024-04-30 ASSESSMENT — ACTIVITIES OF DAILY LIVING (ADL): DEPENDENT_IADLS:: INDEPENDENT

## 2024-04-30 NOTE — LETTER
[facility] CARE COORDINATION  [address]  [place] [address]    April 30, 2024    Blanco Osborne  [address]   [place] [facility] [address]      Dear Blanco,    I am a clinic care coordinator who works with Ki Carter PA-C with the [facility]. I wanted to thank you for spending the time to talk with me.  Below is a description of clinic care coordination and how I can further assist you.       The clinic care coordination team is made up of a registered nurse, , financial resource worker and community health worker who understand the health care system. The goal of clinic care coordination is to help you manage your health and improve access to the health care system. Our team works alongside your provider to assist you in determining your health and social needs. We can help you obtain health care and community resources, providing you with necessary information and education. We can work with you through any barriers and develop a care plan that helps coordinate and strengthen the communication between you and your care team.  Our services are voluntary and are offered without charge to you personally.    Please feel free to contact me with any questions or concerns regarding care coordination and what we can offer.      We are focused on providing you with the highest-quality healthcare experience possible.    Sincerely,      Kelly Rice RN, BSN, PHN  Primary Care / Care Coordinator   [facility]  E-mail [email or web address]   [phone]

## 2024-04-30 NOTE — LETTER
"2024       RE: Blanco Osborne  : 1965   MRN: [id number]      Dear Colleague,    Thank you for referring your patient, Blanco Osborne, [facility]. Please see a copy of my visit note below.    [facility]ile[facility] Progress Note      Patient:  Blanco Osborne  :  1965   Age:  58 year old   Today's Office Visit:  2024    Background history:  Right-handed male with history of NAFLD, S/p Liver Transplant 2016 who presents to this clinic for evaluation and management of his seizures.  He is accompoanied by his brother, Dylan who contributes to the history.  The patient was admitted to the hospital on 2022 with acute respiratory failure due to strep pneumonia and parainfluenza in the setting of being immunosuppressed host given liver transplant and immunosuppresants.  He had septic shock.     Blanco does not recall much of the day of admission.  According to his brother, Dylan, he was not feeling well prior to admission to the hospital, with cold symptoms.  He was confused.  They called 911.  He collapsed when he wanted to walk to the ambualnce.  He had respiratory arrest.  He had 20 minutes CPR and shock and ROSC achieved.      Hospital notes: \"He had acute respiratory failure which slowly improved over time. Complicating factor is that he failed extubation twice. He had a right sided chest tube which was removed but then replaced when pneumothorax recurred.\"    \"He developed acute renal failure due to this illness and is dialysis dependent as of this note.\" He is currently on dialysis M, W, F.   He was transferred to [facility] on trach and PEG and 30% oxygen.     Prolonged video EEG was performed - at [facility].  The patient had 2 seizures during this monitoring.  First seizure occurred on .  He had gaze deviation to the right followed by head turn to the right " followed by facial twitching that progressed to shoulder twitching on the right and then whole body low amplitude twitching.  Ictal EEG onset was unclear since it happened during impedance checks.  The patient had another seizure on 12/21.  It started with gaze deviation to the right and head turn to the right followed by grimacing, stiffening and head jerks and right shoulder delivered followed by low amplitude body jerk and whole body twitching.  The seizure onset was left occipital region.     He was started on lacosamide and levetiracetam.  He is currently taking lacosamide 100 mg twice daily and levetiracetam 1000 mg daily. Medication side effects: Feeling tired and itchy.  Neither patient or his family noted any seizures since discharge from the hospital.  He never had any seizures in the past that they are aware of.     Epilepsy Risk Factors: Patient denies history of febrile seizures, meningitis, encephalitis, significant head injury or family history of epilepsy.  His seizure occurred in the setting of acute respiratory failure, septic shock and renal failure.  His MRI 12/16/2022 showed chronic right parietal encephalomalacia and multiple lacunar strokes in the left frontal, left temporal and posterior right frontal lobe, consistent with embolic strokes.    History of Present Illness:     Mr. Blanco Osborne returns to the clinic for a follow up on his epilepsy. He did not have any seizures since last visit.  He is taking lacosamide 50 mg in the morning and 100 mg in the evening.  Denies side effects.     He is in the list of kidney transplant and needs a neurology clearance for surgery.     He was a  and has not flied since his seizures.  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        Current Outpatient Medications   Medication Sig Dispense Refill     acetaminophen (TYLENOL) 325 MG tablet Take 2 tablets (650 mg) by mouth every 8 hours as needed for other  (For optimal non-opioid multimodal pain management to improve pain control.)       aspirin 81 MG EC tablet Take 1 tablet (81 mg) by mouth daily 90 tablet 3     ELIQUIS ANTICOAGULANT 5 MG tablet Take 1 tablet (5 mg) by mouth 2 times daily 180 tablet 1     gabapentin (NEURONTIN) 100 MG capsule Take 3 capsules (300 mg) by mouth at bedtime 30 capsule 2     hydrOXYzine HCl (ATARAX) 25 MG tablet Take 1 tablet (25 mg) by mouth 3 times daily as needed for itching 90 tablet 1     Lacosamide (VIMPAT) 100 MG TABS tablet Take 1 tablet (100 mg) by mouth every evening 31 tablet 5     lacosamide (VIMPAT) 50 MG TABS tablet Take 1 tablet (50 mg) by mouth every morning 93 tablet 3     melatonin 3 MG tablet Take 1 tablet (3 mg) by mouth At Bedtime (Patient taking differently: Take 3 mg by mouth nightly as needed) 90 tablet 1     midodrine (PROAMATINE) 10 MG tablet TAKE 1 TABLET 3 X DAILY. ON DIALYSIS DAYS(M, W, F) TAKE 2 EXTRA TABLETS 1 HOUR BEFORE, 1 TABLET WHEN YOU ARRIVE, AND 1 TABLET DURING DIALYSIS (Patient taking differently: Take 10 mg by mouth 3 times daily as needed) 450 tablet 1     multivitamin RENAL (TRIPHROCAPS) 1 capsule capsule Take 1 capsule by mouth daily 90 capsule 3     oxyCODONE (ROXICODONE) 5 MG tablet Take 0.5-1 tablets (2.5-5 mg) by mouth every 6 hours as needed for severe pain 15 tablet 0     rOPINIRole (REQUIP) 0.5 MG tablet Take 0.5 mg by mouth 2 times daily as needed       sevelamer carbonate (RENVELA) 800 MG tablet Take 1 tablet (800 mg) by mouth 4 times daily With meals and snacks 120 tablet 0     tacrolimus (GENERIC) 0.5 MG capsule Take 1 capsule (0.5 mg) by mouth 2 times daily 180 capsule 0     traZODone (DESYREL) 50 MG tablet Take 1 tablet (50 mg) by mouth at bedtime 90 tablet 1        Medication Notes:        AED Medication Compliance:  compliant most of the time    Other Issues:    Is patient safe to drive:  Yes    Exam:    /65 (BP Location: Right arm, Patient Position: Sitting, Cuff Size: Adult  Regular)   Pulse 73   Temp 97.7  F (36.5  C) (Temporal)   SpO2 98%      Wt Readings from Last 5 Encounters:   04/27/24 195 lb (88.5 kg)   04/09/24 197 lb 14.4 oz (89.8 kg)   04/03/24 194 lb 14.4 oz (88.4 kg)   03/27/24 197 lb 8 oz (89.6 kg)   03/26/24 200 lb (90.7 kg)     General Appearance: Alert, awake, cooperative, pleasant, NAD  Gait: steady  Attention Span:  Normal  Language/speech: no aphasia or dysarthria  Extraocular Movements:  Normal  Coordination:  Normal finger to nose  Facial Strength:  Normal  Motor Exam: normal tone, bulk and strength 5/5 bilaterally    Assessment and Plan:     #1 Likely focal epilepsy: 2 focal to bilateral generalized tonic-clonic seizures were captured during video-EEG monitoring at [facility] which were identical semiologically.  Ictal EEG onset was not clear with one due to impedance check during seizure onset, and was left occipital and the other one.  Patient's MRI showed remote right parietal stroke and acute left frontotemporal and right frontal small embolic strokes.  The patient was in critical condition in the hospital, with septic shock, renal failure and likely electrolyte abnormalities.  He never had seizures prior to this hospital admission and no seizures were reported since discharge from the hospital.   Levetiracetam was tapered to off since last visit.  lacosamide was decreased to .  He would like to decrease her lacosamide further.    I discussed possibility of seizure-recurrence since he has history of multiple strokes and is at risk of seizure recurrence. The patient understands and still wants to decrease his medication. He is hoping to taper this medication to off to be able to fly again.  I advised this is unlikely. He needs to do some research and finds out about other possible limitations, such as his strokes and Afib before deciding about discontinuing Vimpat.     #2 end-stage renal disease: he is on the list of transplant.  He is clear for  surgery including kidney transplant from neurology/epilepsy perspective.      - Decrease lacosamide to 50 mg bid.    - Obtain lacosamide level in 1 week.    - Follow up in 6 months.         As described above, I met with the patientand during this time counseling was greater than 50% of the visit time.  Reba Laughlin MD                    Again, thank you for allowing me to participate in the care of your patient.      Sincerely,    Reba Laughlin MD

## 2024-04-30 NOTE — PROGRESS NOTES
"[facility] Epilepsy Care Progress Note      Patient:  Blanco Osborne  :  1965   Age:  58 year old   Today's Office Visit:  2024    Background history:  Right-handed male with history of NAFLD, S/p Liver Transplant 2016 who presents to this clinic for evaluation and management of his seizures.  He is accompoanied by his brother, Dylan who contributes to the history.  The patient was admitted to the hospital on 2022 with acute respiratory failure due to strep pneumonia and parainfluenza in the setting of being immunosuppressed host given liver transplant and immunosuppresants.  He had septic shock.     Blanco does not recall much of the day of admission.  According to his brother, Dylan, he was not feeling well prior to admission to the hospital, with cold symptoms.  He was confused.  They called 911.  He collapsed when he wanted to walk to the ambualnce.  He had respiratory arrest.  He had 20 minutes CPR and shock and ROSC achieved.      Hospital notes: \"He had acute respiratory failure which slowly improved over time. Complicating factor is that he failed extubation twice. He had a right sided chest tube which was removed but then replaced when pneumothorax recurred.\"    \"He developed acute renal failure due to this illness and is dialysis dependent as of this note.\" He is currently on dialysis M, W, F.   He was transferred to [facility] on trach and PEG and 30% oxygen.     Prolonged video EEG was performed - at [facility].  The patient had 2 seizures during this monitoring.  First seizure occurred on .  He had gaze deviation to the right followed by head turn to the right followed by facial twitching that progressed to shoulder twitching on the right and then whole body low amplitude twitching.  Ictal EEG onset was unclear since it happened during impedance checks.  The patient had another seizure on .  It started with gaze deviation to the right and head turn to " the right followed by grimacing, stiffening and head jerks and right shoulder delivered followed by low amplitude body jerk and whole body twitching.  The seizure onset was left occipital region.     He was started on lacosamide and levetiracetam.  He is currently taking lacosamide 100 mg twice daily and levetiracetam 1000 mg daily. Medication side effects: Feeling tired and itchy.  Neither patient or his family noted any seizures since discharge from the hospital.  He never had any seizures in the past that they are aware of.     Epilepsy Risk Factors: Patient denies history of febrile seizures, meningitis, encephalitis, significant head injury or family history of epilepsy.  His seizure occurred in the setting of acute respiratory failure, septic shock and renal failure.  His MRI 12/16/2022 showed chronic right parietal encephalomalacia and multiple lacunar strokes in the left frontal, left temporal and posterior right frontal lobe, consistent with embolic strokes.    History of Present Illness:     Mr. Blanco Osborne returns to the clinic for a follow up on his epilepsy. He did not have any seizures since last visit.  He is taking lacosamide 50 mg in the morning and 100 mg in the evening.  Denies side effects.     He is in the list of kidney transplant and needs a neurology clearance for surgery.     He was a  and has not flied since his seizures.  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        Current Outpatient Medications   Medication Sig Dispense Refill     acetaminophen (TYLENOL) 325 MG tablet Take 2 tablets (650 mg) by mouth every 8 hours as needed for other (For optimal non-opioid multimodal pain management to improve pain control.)       aspirin 81 MG EC tablet Take 1 tablet (81 mg) by mouth daily 90 tablet 3     ELIQUIS ANTICOAGULANT 5 MG tablet Take 1 tablet (5 mg) by mouth 2 times daily 180 tablet 1     gabapentin (NEURONTIN) 100 MG capsule Take 3  capsules (300 mg) by mouth at bedtime 30 capsule 2     hydrOXYzine HCl (ATARAX) 25 MG tablet Take 1 tablet (25 mg) by mouth 3 times daily as needed for itching 90 tablet 1     Lacosamide (VIMPAT) 100 MG TABS tablet Take 1 tablet (100 mg) by mouth every evening 31 tablet 5     lacosamide (VIMPAT) 50 MG TABS tablet Take 1 tablet (50 mg) by mouth every morning 93 tablet 3     melatonin 3 MG tablet Take 1 tablet (3 mg) by mouth At Bedtime (Patient taking differently: Take 3 mg by mouth nightly as needed) 90 tablet 1     midodrine (PROAMATINE) 10 MG tablet TAKE 1 TABLET 3 X DAILY. ON DIALYSIS DAYS(M, W, F) TAKE 2 EXTRA TABLETS 1 HOUR BEFORE, 1 TABLET WHEN YOU ARRIVE, AND 1 TABLET DURING DIALYSIS (Patient taking differently: Take 10 mg by mouth 3 times daily as needed) 450 tablet 1     multivitamin RENAL (TRIPHROCAPS) 1 capsule capsule Take 1 capsule by mouth daily 90 capsule 3     oxyCODONE (ROXICODONE) 5 MG tablet Take 0.5-1 tablets (2.5-5 mg) by mouth every 6 hours as needed for severe pain 15 tablet 0     rOPINIRole (REQUIP) 0.5 MG tablet Take 0.5 mg by mouth 2 times daily as needed       sevelamer carbonate (RENVELA) 800 MG tablet Take 1 tablet (800 mg) by mouth 4 times daily With meals and snacks 120 tablet 0     tacrolimus (GENERIC) 0.5 MG capsule Take 1 capsule (0.5 mg) by mouth 2 times daily 180 capsule 0     traZODone (DESYREL) 50 MG tablet Take 1 tablet (50 mg) by mouth at bedtime 90 tablet 1        Medication Notes:        AED Medication Compliance:  compliant most of the time    Other Issues:    Is patient safe to drive:  Yes    Exam:    /65 (BP Location: Right arm, Patient Position: Sitting, Cuff Size: Adult Regular)   Pulse 73   Temp 97.7  F (36.5  C) (Temporal)   SpO2 98%      Wt Readings from Last 5 Encounters:   04/27/24 195 lb (88.5 kg)   04/09/24 197 lb 14.4 oz (89.8 kg)   04/03/24 194 lb 14.4 oz (88.4 kg)   03/27/24 197 lb 8 oz (89.6 kg)   03/26/24 200 lb (90.7 kg)     General Appearance:  Alert, awake, cooperative, pleasant, NAD  Gait: steady  Attention Span:  Normal  Language/speech: no aphasia or dysarthria  Extraocular Movements:  Normal  Coordination:  Normal finger to nose  Facial Strength:  Normal  Motor Exam: normal tone, bulk and strength 5/5 bilaterally    Assessment and Plan:     #1 Likely focal epilepsy: 2 focal to bilateral generalized tonic-clonic seizures were captured during video-EEG monitoring at [facility] which were identical semiologically.  Ictal EEG onset was not clear with one due to impedance check during seizure onset, and was left occipital and the other one.  Patient's MRI showed remote right parietal stroke and acute left frontotemporal and right frontal small embolic strokes.  The patient was in critical condition in the hospital, with septic shock, renal failure and likely electrolyte abnormalities.  He never had seizures prior to this hospital admission and no seizures were reported since discharge from the hospital.   Levetiracetam was tapered to off since last visit.  lacosamide was decreased to .  He would like to decrease her lacosamide further.    I discussed possibility of seizure-recurrence since he has history of multiple strokes and is at risk of seizure recurrence. The patient understands and still wants to decrease his medication. He is hoping to taper this medication to off to be able to fly again.  I advised this is unlikely. He needs to do some research and finds out about other possible limitations, such as his strokes and Afib before deciding about discontinuing Vimpat.     #2 end-stage renal disease: he is on the list of transplant.  He is clear for surgery including kidney transplant from neurology/epilepsy perspective.      - Decrease lacosamide to 50 mg bid.    - Obtain lacosamide level in 1 week.    - Follow up in 6 months.         As described above, I met with the patientand during this time counseling was greater than 50% of the visit  time.  Reba Laughlin MD

## 2024-04-30 NOTE — PROGRESS NOTES
Clinic Care Coordination Contact  Clinic Care Coordination Contact  OUTREACH with Post Discharge Assessment    Referral Information:  Referral Source: IP Report    Primary Diagnosis: Other (include Comment box) (Bleeding after removal of CVC tunneled catheter, acute blood loss anemia, ESRD on HD M/W/F, A.Fib, cirrhosis s/p Liver)    Chief Complaint   Patient presents with    Clinic Care Coordination - Initial    Clinic Care Coordination - Post Hospital      [place] Utilization:   [facility]  Clinic Utilization:  [facility]   Difficulty keeping appointments:: No  Compliance Concerns: No  No-Show Concerns: No  No PCP office visit in Past Year: No  Utilization      No Show Count (past year)  5             ED Visits  5             Hospital Admissions  11                    Current as of: 4/30/2024  2:55 AM              Clinical Concerns:  Current Medical Concerns:  Recent discharge from hospital    Current Behavioral Concerns: None    Education Provided to patient:   RNCC called and spoke with patient/caregiver; introduced self, discussed role of Care Coordination and explained reason for call   Pain  Pain (GOAL):: No (Pain medication allevites CVC/arm pain)  Health Maintenance Reviewed: Due/Overdue   Health Maintenance Due   Topic Date Due    MICROALBUMIN  Never done    ADVANCE CARE PLANNING  Never done    MEDICARE ANNUAL WELLNESS VISIT  Never done    HEPATITIS A IMMUNIZATION (4 of 4 - Hep A Twinrix risk 4-dose series) 06/26/2016    HEPATITIS B IMMUNIZATION (1 of 1 - Risk Dialysis 4-dose series) 05/31/2017    Pneumococcal Vaccine: Pediatrics (0 to 5 Years) and At-Risk Patients (6 to 64 Years) (3 of 3 - PPSV23 or PCV20) 03/15/2021     Clinical Pathway: None    Admission:    Admission Date: 04/27/24   Reason for Admission: Bleeding after removal of CVC tunneled catheter, acute blood loss anemia, ESRD on HD M/W/F, A.Fib, cirrhosis s/p Liver  Discharge:   Discharge Date:  "04/28/24  Discharge Diagnosis: Bleeding after removal of CVC tunneled catheter, acute blood loss anemia, ESRD on HD M/W/F, A.Fib, cirrhosis s/p Liver    Discharge Assessment  How are you doing now that you are home?: \"My arm still hurts but it'snot bleeding anymore\"  How are your symptoms? (Red Flag symptoms escalate to triage hotline per guidelines): Improved  Do you feel your condition is stable enough to be safe at home until your provider visit?: Yes  Does the patient have their discharge instructions? : Yes  Does the patient have questions regarding their discharge instructions? : No  Were you started on any new medications or were there changes to any of your previous medications? : Yes  Does the patient have all of their medications?: Yes  Do you have questions regarding any of your medications? : No  Do you have all of your needed medical supplies or equipment (DME)?  (i.e. oxygen tank, CPAP, cane, etc.): Yes  Discharge follow-up appointment scheduled within 14 calendar days? : Yes  Discharge Follow Up Appointment Date: 04/30/24  Discharge Follow Up Appointment Scheduled with?: Specialty Care Provider    Post-op (Clinicians Only)  Did the patient have surgery or a procedure: No  Fever: No  Chills: No  Eating & Drinking: eating and drinking without complaints/concerns  PO Intake: regular diet  Additional Symptoms: decreased appetite  Bowel Function: normal  Date of last BM: 04/30/24  Urinary Status: voiding without complaint/concerns    Medication Management:  Medication review status: Medications reviewed and no changes reported per patient.        Current Outpatient Medications   Medication Sig Dispense Refill    acetaminophen (TYLENOL) 325 MG tablet Take 2 tablets (650 mg) by mouth every 8 hours as needed for other (For optimal non-opioid multimodal pain management to improve pain control.)      aspirin 81 MG EC tablet Take 1 tablet (81 mg) by mouth daily 90 tablet 3    ELIQUIS ANTICOAGULANT 5 MG tablet " Take 1 tablet (5 mg) by mouth 2 times daily 180 tablet 1    gabapentin (NEURONTIN) 100 MG capsule Take 3 capsules (300 mg) by mouth at bedtime 30 capsule 2    hydrOXYzine HCl (ATARAX) 25 MG tablet Take 1 tablet (25 mg) by mouth 3 times daily as needed for itching 90 tablet 1    Lacosamide (VIMPAT) 100 MG TABS tablet Take 1 tablet (100 mg) by mouth every evening 31 tablet 5    lacosamide (VIMPAT) 50 MG TABS tablet Take 1 tablet (50 mg) by mouth every morning 93 tablet 3    melatonin 3 MG tablet Take 1 tablet (3 mg) by mouth At Bedtime (Patient taking differently: Take 3 mg by mouth nightly as needed) 90 tablet 1    midodrine (PROAMATINE) 10 MG tablet TAKE 1 TABLET 3 X DAILY. ON DIALYSIS DAYS(M, W, F) TAKE 2 EXTRA TABLETS 1 HOUR BEFORE, 1 TABLET WHEN YOU ARRIVE, AND 1 TABLET DURING DIALYSIS (Patient taking differently: Take 10 mg by mouth 3 times daily as needed) 450 tablet 1    multivitamin RENAL (TRIPHROCAPS) 1 capsule capsule Take 1 capsule by mouth daily 90 capsule 3    oxyCODONE (ROXICODONE) 5 MG tablet Take 0.5-1 tablets (2.5-5 mg) by mouth every 6 hours as needed for severe pain 15 tablet 0    rOPINIRole (REQUIP) 0.5 MG tablet Take 0.5 mg by mouth 2 times daily as needed      sevelamer carbonate (RENVELA) 800 MG tablet Take 1 tablet (800 mg) by mouth 4 times daily With meals and snacks 120 tablet 0    tacrolimus (GENERIC) 0.5 MG capsule Take 1 capsule (0.5 mg) by mouth 2 times daily 180 capsule 0    traZODone (DESYREL) 50 MG tablet Take 1 tablet (50 mg) by mouth at bedtime 90 tablet 1     No current facility-administered medications for this visit.      Functional Status:  Dependent ADLs:: Independent  Dependent IADLs:: Independent  Bed or wheelchair confined:: No  Mobility Status: Independent    Living Situation:  Current living arrangement:: I live in a private home with spouse (Ofe, wife)  Type of residence:: Apartment    Lifestyle & Psychosocial Needs:    Social Determinants of Health     Food Insecurity:  Low Risk  (1/16/2024)    Food Insecurity     Within the past 12 months, did you worry that your food would run out before you got money to buy more?: No     Within the past 12 months, did the food you bought just not last and you didn t have money to get more?: No   Depression: Not at risk (4/3/2024)    PHQ-2     PHQ-2 Score: 0   Housing Stability: Low Risk  (1/16/2024)    Housing Stability     Do you have housing? : Yes     Are you worried about losing your housing?: No   Tobacco Use: Low Risk  (4/25/2024)    Patient History     Smoking Tobacco Use: Never     Smokeless Tobacco Use: Never     Passive Exposure: Not on file   Financial Resource Strain: Low Risk  (1/16/2024)    Financial Resource Strain     Within the past 12 months, have you or your family members you live with been unable to get utilities (heat, electricity) when it was really needed?: No   Alcohol Use: Unknown (10/7/2019)    AUDIT-C     Frequency of Alcohol Consumption: Monthly or less     Average Number of Drinks: Not on file     Frequency of Binge Drinking: Not on file   Transportation Needs: Low Risk  (1/16/2024)    Transportation Needs     Within the past 12 months, has lack of transportation kept you from medical appointments, getting your medicines, non-medical meetings or appointments, work, or from getting things that you need?: No   Physical Activity: Not on file   Interpersonal Safety: Low Risk  (9/21/2023)    Interpersonal Safety     Do you feel physically and emotionally safe where you currently live?: Yes     Within the past 12 months, have you been hit, slapped, kicked or otherwise physically hurt by someone?: No     Within the past 12 months, have you been humiliated or emotionally abused in other ways by your partner or ex-partner?: No   Stress: Not on file   Social Connections: Not on file   Health Literacy: Not on file     Diet:: Regular  Inadequate nutrition (GOAL):: No  Tube Feeding: No  Inadequate activity/exercise (GOAL)::  No  Significant changes in sleep pattern (GOAL): No  Transportation means:: Regular car     Uatsdin or spiritual beliefs that impact treatment:: No  Mental health DX:: No  Mental health management concern (GOAL):: No  Chemical Dependency Status: No Current Concerns  Chemical Dependency Management:  (NA)  Informal Support system:: Spouse (Ofe, wife)      Resources and Interventions:  Current Resources:   Community Resources: Dialysis Services, [facility] ([facility])  Supplies Currently Used at Home: None  Equipment Currently Used at Home: none  Employment Status: disabled     Advance Care Plan/Directive  Advanced Care Plans/Directives on file:: No    Referrals Placed: None     Care Plan: NA    Future Appointments                Today 3,  Cardiac Rehab [facility] [place], [facility]    Today Reba Laughlin MD  [facility]    In 2 days 3,  Cardiac Rehab [facility] [place], [facility]    In 1 week 3,  Cardiac Rehab [facility] [place], [facility]    In 1 week 3,  Cardiac Rehab [facility] [place], [facility]    In 3 weeks [facility], [place] [facility]    In 1 month Juan Simms MD [facility]            Plan:   -Patient will contact the care team with questions, concerns, support needs   -Patient will use the clinic as a resource and understands (s)he can contact the [place] clinic with 24/7 after hours services available  -Care Coordinator will remain available as needed  -No further Care Coordination outreaches at this time     Klely Rice RN, BSN, PHN  Primary Care / Care Coordinator   [facility]  [facility]  E-mail [email or web address]   [phone]

## 2024-05-01 ENCOUNTER — TELEPHONE (OUTPATIENT)
Dept: TRANSPLANT | Facility: CLINIC | Age: 59
End: 2024-05-01
Payer: COMMERCIAL

## 2024-05-01 ENCOUNTER — COMMITTEE REVIEW (OUTPATIENT)
Dept: TRANSPLANT | Facility: CLINIC | Age: 59
End: 2024-05-01
Payer: COMMERCIAL

## 2024-05-01 ENCOUNTER — LAB (OUTPATIENT)
Dept: LAB | Facility: CLINIC | Age: 59
End: 2024-05-01
Payer: COMMERCIAL

## 2024-05-01 DIAGNOSIS — Z76.82 AWAITING ORGAN TRANSPLANT: ICD-10-CM

## 2024-05-01 PROCEDURE — 86833 HLA CLASS II HIGH DEFIN QUAL: CPT

## 2024-05-01 PROCEDURE — 86832 HLA CLASS I HIGH DEFIN QUAL: CPT

## 2024-05-01 NOTE — COMMITTEE REVIEW
Abdominal Committee Review Note     Evaluation Date: 1/11/2024  Committee Review Date: 5/1/2024    Organ being evaluated for: Kidney    Transplant Phase: Evaluation  Transplant Status: Active    Transplant Coordinator: Tamara Khan  Transplant Surgeon:  Dr. Jack Ware    Referring Physician: Dr. Zenon Bradshaw    Primary Diagnosis: Cirrhosis: Fatty Liver (CARRILLO)  Secondary Diagnosis:     Committee Review Members:  Nephrology Marlon Salmon, APRN CNP, Joy Lamb MD, Ministerio Madrid MD   Nurse Alex Overton, PETER, Stephania Baer, APRN CNP, Nilda Purcell, APRN CNP, MANUELA LO, PETER   Nutrition Gunjan Grullon, RD   Pharmacist Darline Rosado, [facility]    - Clinical Martha Hoyt, [facility]   Transplant ANA MONTGOMERY, RN, Yisel Pfeiffer, PETER, Pat Wong, RN, Jana Solis, RN, Aki Benitez MD, Tamara Abel, RN, Martha Aguilar, RN, Laura Parson, LIEN, Laura Erwin, PETER, Reynaldo Bhatia, PETER, Mimi Cali, PETER, Theresa Arechiga, PETER, Noa Tadeo, RN   Transplant Surgery Mona Onofre MD, MD       Transplant Eligibility: Irreversible chronic kidney disease treated w/dialysis or expected need for dialysis    Committee Review Decision: Approved    Relative Contraindications: None    Absolute Contraindications: None    Committee Chair Mona Onofre MD verbally attested to the committee's decision.    Committee Discussion Details: Reviewed pt's medical status and evaluation results to date. Pt is s/p liver transplant 2016 d/t CARRILLO and follows w/ Dr. Simms.  Pt was last seen by Dr. Simms on 12/29/23, who has no concerns w/ his liver function and supports his kidney transplant candidacy. The pt had an abdominal CT done during a hospitalization on 10/23/23 which revealed splenomegaly, ascites, and lymphadenopathy. The CT was reviewed by Dr. Onofre and an EBV count was obtained to rule out PTLD (less than 500 on 2/15/24).  Additionally, Dr. Onofre  requested a transjugular liver bx w/ portal pressure gradients and an US of his liver w/ dopplers of the IVC/portal vein- completed on 2/15/24, revealing a patent doppler with an unremarkable appearance of his liver (reviewed and ok'd by Dr. Benitez 2/202/24). The pt underwent a transjugular liver bx on 4/9/24 which showed no  significant portal systemic gradient, however the biopsy did show: Severe hepatitis with many plasma cells, activity grade 3-4 /4; likely mild fibrosis. Dr. Simms reviewed and is increasing his maintenance IS. The Committee discussed the pt's thrombocytopenia and does not feel further work up is needed as it is likely d/t splenomegaly.  In terms of vascular targets for surgery, pt's Ab/Pelv CT 10/23/24 and iliac US 1/30/24 were ok'd by Dr. Onofre. Cardiac wise, pt has a hx of chronic hypotension, using midodrine on dialysis days (10mg TID and 10-20 QID PRN 1 hour prior to HD, at HD, during HD, and after HD), reviewed his SBPs from [employer] (scanned into media) pre and post SBP ranges 110s- 150s, ok'd by the Committee. Pt's other cardiac hx includes paroxysmal A-fib on Eliquis, PEA arrest, CVA and pericardial effusion in October 10/2023.  Pt's most recent echo was done on 1/11/24 and showed an EF of 60-65.  Pt underwent Coronary Angiogram 2/22/24 which revealed 20% stenosis of mLAD and otherwise normal coronaries,pt then saw Stephania Baer NP on 3/27/24 and was cleared to proceed from a cardiac perspective. Reviewed that pt has a hx of seizure and CVA during his 11/2022 hospitalization, he had not had seizures before or after his hospitalization. He follows with Neurology and was last seen 4/30/24 and cleared from a neurologic perspective.  Reviewed with the Committee that the pt's stroke was found to be embolic per MRI 12/16/2022 and most recent imaging was Carotid US 11/12/2022 and CT Head 11/12/2022, no further imaging was recommended by the Committee.  Discussed that the pt's d/c  summary from 11/1/23 notes a PE diagnosed on 10/5/25, the Committee states no further intervention needed as pt is anticoagulated. Pt's health maintenance is UTD: Colonoscopy: 3/26/2024.  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  Pt is determined to be an approved candidate for kidney transplant.  At the last Committee review on 1/17/24, the Committee thought he may be a candidate for LDKT only, confirmed with Committee today he is approved for both DDKT and LDKT. The pt will be eligible for active listing status on the kidney transplant wait list once he is transitioned off of Eliquis to Warfarin.  Pt will be called and summary letter will be sent.

## 2024-05-01 NOTE — TELEPHONE ENCOUNTER
Message from Dr. Simms:    From: Juan Simms MD   Sent: 4/24/2024  12:26 PM CDT   To: Enriqueta Garcia MD; Tamara Abel RN   Subject: RE: Kidney Transplant Candidacy?                 I think he is good for KTx.  Importantly, there is no fibrosis on the biopsy and we will deal with the plasma-cell-rich hepatitis with an increase in his maintenance IS, which will simply look like a base-line KTx regimen.    Message to Dr. Simms asking for new tac goal.

## 2024-05-02 ENCOUNTER — HOSPITAL ENCOUNTER (OUTPATIENT)
Dept: CARDIAC REHAB | Facility: CLINIC | Age: 59
Discharge: HOME | End: 2024-05-02
Attending: STUDENT IN AN ORGANIZED HEALTH CARE EDUCATION/TRAINING PROGRAM
Payer: COMMERCIAL

## 2024-05-02 ENCOUNTER — TELEPHONE (OUTPATIENT)
Dept: TRANSPLANT | Facility: CLINIC | Age: 59
End: 2024-05-02
Payer: COMMERCIAL

## 2024-05-02 PROCEDURE — 93798 PHYS/QHP OP CAR RHAB W/ECG: CPT | Performed by: REHABILITATION PRACTITIONER

## 2024-05-02 NOTE — TELEPHONE ENCOUNTER
Called pt to discuss outcome of Selection Committee. Left VM requesting call back to direct line to discuss next steps.     Pt and wife returned my call, we discussed that he is approved to be listed active status on the kidney transplant wait list, but we are first going to need to transition him from Eliquis to Coumadin and will also work with his insurance for PA.  I let them know I have reached out to Cardiology to assist with the medication change and will let them know when the insurance approval is in place. We discussed the average wait time on the DDKT list and I encouraged him to continue to seek living donors. They had no further questions at this time.

## 2024-05-03 ENCOUNTER — TELEPHONE (OUTPATIENT)
Dept: TRANSPLANT | Facility: CLINIC | Age: 59
End: 2024-05-03
Payer: COMMERCIAL

## 2024-05-03 ENCOUNTER — TELEPHONE (OUTPATIENT)
Dept: ANTICOAGULATION | Facility: CLINIC | Age: 59
End: 2024-05-03
Payer: COMMERCIAL

## 2024-05-03 DIAGNOSIS — Z76.82 ORGAN TRANSPLANT CANDIDATE: ICD-10-CM

## 2024-05-03 DIAGNOSIS — Z94.4 LIVER TRANSPLANTED (H): Primary | ICD-10-CM

## 2024-05-03 DIAGNOSIS — J90 PLEURAL EFFUSION: ICD-10-CM

## 2024-05-03 DIAGNOSIS — I63.9 EMBOLIC STROKE (H): Primary | ICD-10-CM

## 2024-05-03 DIAGNOSIS — Z79.01 ANTICOAGULATED: Primary | ICD-10-CM

## 2024-05-03 DIAGNOSIS — Z79.01 ANTICOAGULATED: ICD-10-CM

## 2024-05-03 RX ORDER — TACROLIMUS 1 MG/1
1 CAPSULE ORAL EVERY 12 HOURS
Qty: 180 CAPSULE | Refills: 3 | Status: SHIPPED | OUTPATIENT
Start: 2024-05-03

## 2024-05-03 NOTE — TELEPHONE ENCOUNTER
In light of recent liver biopsy will increase tac goal to 5-7.  Last tac level is from 1/11/2024  and it was 3.4 on tac 0.5 mg bid. D  Please call Blanco, tell him new goal is 5-7, increase dose to 1 mg po bid and ask him to have all post transplant labs w/ 12 hour trough tac level in a week.

## 2024-05-03 NOTE — TELEPHONE ENCOUNTER
Anticoagulation Management    Blanco Osborne, 58 year old, male is referred to start warfarin he kidney transplant team needs patient to be changed from Eliquis to Warfarin in order to be active on the kidney transplant wait list     Indication for Anticoagulation:  PE (10/5/23); Atrial Fibrillation   [id number] = 3 (Diabetes, and Stroke (11/2022))      -Echo 1/11/24-EF of 60-65%.     Additional factors- Hx of liver transplant (2016), ESRD-dialysis    Goal INR Range: 2.0-3.0    Ethnicity:Not  or   Race: White    Wt Readings from Last 2 Encounters:   04/27/24 88.5 kg (195 lb)   04/09/24 89.8 kg (197 lb 14.4 oz)     Estimated body mass index is 26.45 kg/m  as calculated from the following:    Height as of 4/3/24: 1.829 m (6').    Weight as of 4/27/24: 88.5 kg (195 lb).      Tobacco Use      Smoking status: Never      Smokeless tobacco: Never    Social History    Substance and Sexual Activity      Alcohol use: Not Currently        Alcohol/week: 0.0 standard drinks of alcohol      Lab Results   Component Value Date    INR 1.23 (H) 04/27/2024     Lab Results   Component Value Date    ALT <5 01/11/2024    AST 28 01/11/2024    ALBUMIN 4.1 01/11/2024     Lab Results   Component Value Date    HGB 8.2 (L) 04/28/2024    HCT 25.2 (L) 04/28/2024    PLT 51 (L) 04/28/2024     Lab Results   Component Value Date    CR 8.05 (H) 04/28/2024    CR 4.47 (H) 04/03/2024     Estimated Creatinine Clearance: 12.4 mL/min (A) (based on SCr of 8.05 mg/dL (H)).    Viewing genomic indicators requires additional security.     RECOMMENDATION     Start warfarin 4 mg daily (use 2 mg tablets for dose titration)  Overlap with Apixaban (Eliquis) 3 days. Check INR just before due for next DOAC dose and/or hold off on DOAC dose until after INR drawn on lab days. Revisit further overlap based on INR results    Check INR 4-5 days after starting warfarin    Sharon Mccall, [facility]  Anticoagulation Clinic

## 2024-05-03 NOTE — TELEPHONE ENCOUNTER
5/15/24    Spoke to Blanco.  Sent prescription to [facility] pharmacy.  Will start a new encounter to go over medication.  Gave instructions to patient to start Warfarin once daily (in the evening) 4mg once he receives the prescription.  He will be overlapping with Eliquis for 3 days.  Writer sent message to [facility] to follow up with education on Friday 5/17/24 to see how Blanco is doing, and to set up an INR.  Patient is not up to education at time of encounter, he is not feeling well today.  Sent My chart message with instructions, and noted in script for Pharmacist to please go over medication with patient.  Zaheer Erwin RN      5/14/24 notes from [facility] on low platelet count:  Platelets are trending up and > 50and actively on Eliquis (equivalent level of anticoag)    so we are replacing not intensifying. I think you can start transition and maybe just FYI cardiology of current platelet count       Dr. Abreu signed orders on 5/14/24, [facility] is following Blanco d/t transplant listing as indication on transition from DOAC to Warfarin.  TN

## 2024-05-06 NOTE — TELEPHONE ENCOUNTER
5/6/24    LVM for patient to call [facility] back to transition from DOAC to Warfarin.  Pended prescription for 2mg tablets in note below.  TN

## 2024-05-07 ENCOUNTER — HOSPITAL ENCOUNTER (OUTPATIENT)
Dept: CARDIAC REHAB | Facility: CLINIC | Age: 59
Discharge: HOME | End: 2024-05-07
Attending: STUDENT IN AN ORGANIZED HEALTH CARE EDUCATION/TRAINING PROGRAM
Payer: COMMERCIAL

## 2024-05-07 ENCOUNTER — TELEPHONE (OUTPATIENT)
Dept: ANTICOAGULATION | Facility: CLINIC | Age: 59
End: 2024-05-07
Payer: COMMERCIAL

## 2024-05-07 PROCEDURE — 93798 PHYS/QHP OP CAR RHAB W/ECG: CPT | Performed by: REHABILITATION PRACTITIONER

## 2024-05-07 NOTE — TELEPHONE ENCOUNTER
5/7/24    M for patient to call the clinic to get him on Warfarin.  See 5/3/24 telephone encounter for pending Warfarin prescription, and plan for him to transition from Eliquis to Warfarin.  Transplant team is eager to get him listed.  This is part of the pre-transplant process.    Zaheer Erwin,RN       RECOMMENDATION      Start warfarin 4 mg daily (use 2 mg tablets for dose titration)  Overlap with Apixaban (Eliquis) 3 days. Check INR just before due for next DOAC dose and/or hold off on DOAC dose until after INR drawn on lab days. Revisit further overlap based on INR results     Check INR 4-5 days after starting warfarin     Sharon Mccall, [facility]  Anticoagulation Clinic

## 2024-05-08 NOTE — TELEPHONE ENCOUNTER
Left 2nd message requesting patient call back with the earliest date he would like us to starting looking at for scheduling, and if there are any dates to avoid.

## 2024-05-09 ENCOUNTER — TELEPHONE (OUTPATIENT)
Dept: TRANSPLANT | Facility: CLINIC | Age: 59
End: 2024-05-09

## 2024-05-09 ENCOUNTER — OFFICE VISIT (OUTPATIENT)
Dept: FAMILY MEDICINE | Facility: CLINIC | Age: 59
End: 2024-05-09
Payer: COMMERCIAL

## 2024-05-09 ENCOUNTER — TELEPHONE (OUTPATIENT)
Dept: OTHER | Facility: CLINIC | Age: 59
End: 2024-05-09

## 2024-05-09 ENCOUNTER — HOSPITAL ENCOUNTER (OUTPATIENT)
Dept: CARDIAC REHAB | Facility: CLINIC | Age: 59
Discharge: HOME | End: 2024-05-09
Attending: STUDENT IN AN ORGANIZED HEALTH CARE EDUCATION/TRAINING PROGRAM
Payer: COMMERCIAL

## 2024-05-09 VITALS
SYSTOLIC BLOOD PRESSURE: 114 MMHG | HEART RATE: 76 BPM | HEIGHT: 72 IN | RESPIRATION RATE: 14 BRPM | WEIGHT: 200.7 LBS | DIASTOLIC BLOOD PRESSURE: 72 MMHG | TEMPERATURE: 96.9 F | OXYGEN SATURATION: 96 % | BODY MASS INDEX: 27.19 KG/M2

## 2024-05-09 DIAGNOSIS — Z94.4 LIVER TRANSPLANTED (H): ICD-10-CM

## 2024-05-09 DIAGNOSIS — N18.6 ESRD (END STAGE RENAL DISEASE) ON DIALYSIS (H): ICD-10-CM

## 2024-05-09 DIAGNOSIS — Z99.2 ESRD (END STAGE RENAL DISEASE) ON DIALYSIS (H): ICD-10-CM

## 2024-05-09 DIAGNOSIS — T82.898A OTHER SPECIFIED COMPLICATION OF VASCULAR PROSTHETIC DEVICES, IMPLANTS AND GRAFTS, INITIAL ENCOUNTER (H): ICD-10-CM

## 2024-05-09 DIAGNOSIS — G40.109 FOCAL EPILEPSY (H): ICD-10-CM

## 2024-05-09 DIAGNOSIS — Z09 HOSPITAL DISCHARGE FOLLOW-UP: Primary | ICD-10-CM

## 2024-05-09 DIAGNOSIS — L29.9 ITCHING: ICD-10-CM

## 2024-05-09 LAB
BASOPHILS # BLD AUTO: 0.1 10E3/UL (ref 0–0.2)
BASOPHILS NFR BLD AUTO: 1 %
EOSINOPHIL # BLD AUTO: 0.2 10E3/UL (ref 0–0.7)
EOSINOPHIL NFR BLD AUTO: 4 %
ERYTHROCYTE [DISTWIDTH] IN BLOOD BY AUTOMATED COUNT: 15.1 % (ref 10–15)
HCT VFR BLD AUTO: 26.6 % (ref 40–53)
HGB BLD-MCNC: 8.6 G/DL (ref 13.3–17.7)
IMM GRANULOCYTES # BLD: 0 10E3/UL
IMM GRANULOCYTES NFR BLD: 0 %
LYMPHOCYTES # BLD AUTO: 1.1 10E3/UL (ref 0.8–5.3)
LYMPHOCYTES NFR BLD AUTO: 22 %
MCH RBC QN AUTO: 32.2 PG (ref 26.5–33)
MCHC RBC AUTO-ENTMCNC: 32.3 G/DL (ref 31.5–36.5)
MCV RBC AUTO: 100 FL (ref 78–100)
MONOCYTES # BLD AUTO: 0.6 10E3/UL (ref 0–1.3)
MONOCYTES NFR BLD AUTO: 12 %
NEUTROPHILS # BLD AUTO: 3 10E3/UL (ref 1.6–8.3)
NEUTROPHILS NFR BLD AUTO: 62 %
PLATELET # BLD AUTO: 91 10E3/UL (ref 150–450)
RBC # BLD AUTO: 2.67 10E6/UL (ref 4.4–5.9)
WBC # BLD AUTO: 4.8 10E3/UL (ref 4–11)

## 2024-05-09 PROCEDURE — 99495 TRANSJ CARE MGMT MOD F2F 14D: CPT | Performed by: PHYSICIAN ASSISTANT

## 2024-05-09 PROCEDURE — 36415 COLL VENOUS BLD VENIPUNCTURE: CPT | Performed by: PHYSICIAN ASSISTANT

## 2024-05-09 PROCEDURE — 99000 SPECIMEN HANDLING OFFICE-LAB: CPT | Performed by: PHYSICIAN ASSISTANT

## 2024-05-09 PROCEDURE — 85025 COMPLETE CBC W/AUTO DIFF WBC: CPT | Performed by: PHYSICIAN ASSISTANT

## 2024-05-09 PROCEDURE — 80235 DRUG ASSAY LACOSAMIDE: CPT | Mod: 90 | Performed by: PHYSICIAN ASSISTANT

## 2024-05-09 PROCEDURE — 93798 PHYS/QHP OP CAR RHAB W/ECG: CPT | Performed by: REHABILITATION PRACTITIONER

## 2024-05-09 RX ORDER — GABAPENTIN 100 MG/1
300 CAPSULE ORAL AT BEDTIME
Qty: 90 CAPSULE | Refills: 3 | Status: SHIPPED | OUTPATIENT
Start: 2024-05-09 | End: 2024-08-20

## 2024-05-09 RX ORDER — OXYCODONE HYDROCHLORIDE 5 MG/1
2.5-5 TABLET ORAL EVERY 6 HOURS PRN
Qty: 15 TABLET | Refills: 0 | Status: SHIPPED | OUTPATIENT
Start: 2024-05-09 | End: 2024-09-26

## 2024-05-09 ASSESSMENT — PAIN SCALES - GENERAL: PAINLEVEL: SEVERE PAIN (6)

## 2024-05-09 NOTE — TELEPHONE ENCOUNTER
Received message that the Coumadin Clinic has been unable to reach the pt. Called the pt and let him know that he will need to call the Coumadin clinic so that he can begin transitioning from Eliquis to Coumadin for listing on the kidney transplant list. The pt stated he wants to discuss with his PCP whether or not he truly needs to be on anticoagulation, he feels his A-fib was an acute issue and that his stroke occurred during a hospitalization when he was really sick. He states he is having issues w/ bleeding w/ dialysis which is why he'd prefer not to be on anti-coagulation. I let him know his PCP, Cardiologist, and Neurologist would need to discuss further. He expressed understanding and will let me know if there will be changes on this matter. TonyaBMEYE message w/ Coumadin clinic phone number sent.

## 2024-05-09 NOTE — TELEPHONE ENCOUNTER
Ok to schedule AVF US and in clinic visit with Dr. Mcneil on same day.      Ultrasound is needed prior to discussing any need for revision, hence the reason for the imaging and visit.    ANAND RoblesN, RN, [facility]

## 2024-05-09 NOTE — TELEPHONE ENCOUNTER
Patient is scheduled for his Ultrasound and Dr Mcneil appointment on 06/13/24. The 05/23/24 Ultrasound has been cancelled.

## 2024-05-09 NOTE — TELEPHONE ENCOUNTER
[facility]    Who is the name of the provider?:  ELICEO PALUMBO   What is the location you see this provider at/preferred location?: [place]  Person calling / Facility: Pt wife Ofe  Phone number:  [phone]  Nurse call back needed:  Yes or route to scheduling     Reason for call:  Pt (wife) would like cancel 5/23 ultrasound  and schedule an ultrasound and appt with Dr Palumbo on the same day. Please advise.  Revision of the fistula was mentioned.    Pharmacy location: n/a  Outside Imaging: n/a   Can we leave a detailed message on this number?  YES     5/9/2024, 10:18 AM

## 2024-05-09 NOTE — PROGRESS NOTES
Assessment & Plan     Hospital discharge follow-up  Repeat blood counts today.  Sutures removed from right sided anterior chest.  He tolerated procedure well    - CBC with platelets and differential    ESRD (end stage renal disease) on dialysis (H)  Plan transition to warfarin.  Will contact anticoagulation team.  - Albumin Random Urine Quantitative with Creat Ratio  - CBC with platelets and differential    Liver transplanted (H)  Continue follow-up with transplant team    Focal epilepsy (H)  Continue follow-up with neurology team.  - Lacosamide Level    Other specified complication of vascular prosthetic devices, implants and grafts, initial encounter (H24)  Refilled medication.  Close follow-up with new or worsening pain  - oxyCODONE (ROXICODONE) 5 MG tablet  Dispense: 15 tablet; Refill: 0    Itching  Refill gabapentin for itching.  Continue with derma phototherapy.  Continue hydralazine.  Combination of these medications are very helpful for his sleep.  - gabapentin (NEURONTIN) 100 MG capsule  Dispense: 90 capsule; Refill: 3      40 minutes spent by me on the date of the encounter doing chart review, review of test results, interpretation of tests, patient visit, and documentation     MED REC REQUIRED  Post Medication Reconciliation Status:     BMI  Estimated body mass index is 27.22 kg/m  as calculated from the following:    Height as of this encounter: 1.829 m (6').    Weight as of this encounter: 91 kg (200 lb 11.2 oz).   Weight management plan: Discussed healthy diet and exercise guidelines      Rolf Simeon is a 58 year old, presenting for the following health issues:  Hospital F/U  Here today for hospital discharge follow-up.    Overall feeling well.  Bleeding is since stopped around tunneled CVC catheter site.  Following with vascular.  Notes pretty severe pain on occasion of the left arm.  Requesting refill on pain medication.    Plans to start/transition to warfarin in anticipation of kidney  "transplant.  Wishes to discuss today.    Met with neurology, decreasing lacosamide    Continues hydralazine for itching.  Working with dermatology and undergoing phototherapy.          4/30/2024     8:44 AM   Post Discharge Outreach   Admission Date 4/27/2024   Reason for Admission Bleeding after removal of CVC tunneled catheter, acute blood loss anemia, ESRD on HD M/W/F, A.Fib, cirrhosis s/p Liver   Discharge Date 4/28/2024   Discharge Diagnosis Bleeding after removal of CVC tunneled catheter, acute blood loss anemia, ESRD on HD M/W/F, A.Fib, cirrhosis s/p Liver   How are you doing now that you are home? \"My arm still hurts but it'snot bleeding anymore\"   How are your symptoms? (Red Flag symptoms escalate to triage hotline per guidelines) Improved   Do you feel your condition is stable enough to be safe at home until your provider visit? Yes   Does the patient have their discharge instructions?  Yes   Does the patient have questions regarding their discharge instructions?  No   Were you started on any new medications or were there changes to any of your previous medications?  Yes   Does the patient have all of their medications? Yes   Do you have questions regarding any of your medications?  No   Do you have all of your needed medical supplies or equipment (DME)?  (i.e. oxygen tank, CPAP, cane, etc.) Yes   Discharge follow-up appointment scheduled within 14 calendar days?  Yes   Discharge Follow Up Appointment Date 4/30/2024   Discharge Follow Up Appointment Scheduled with? Specialty Care Provider     Hospital Follow-up Visit:    Hospital/Nursing Home/IP Rehab Facility: [facility]  Date of Admission: 4/27/2024  Date of Discharge: 2/28/2024  Reason(s) for Admission: Bleeding after removal of tunneled catheter.   Was the patient in the ICU or did the patient experience delirium during hospitalization?  No  Do you have any other stressors you would like to discuss with your provider? " No    Problems taking medications regularly:  None  Medication changes since discharge: None  Problems adhering to non-medication therapy:  None    Summary of hospitalization:  [facility] discharge summary reviewed  Diagnostic Tests/Treatments reviewed.  Follow up needed: Vascular, transplant  Other Healthcare Providers Involved in Patient s Care: Multiple          Update since discharge: improved.         Plan of care communicated with patient and family               Review of Systems  Constitutional, neuro, ENT, endocrine, pulmonary, cardiac, gastrointestinal, genitourinary, musculoskeletal, integument and psychiatric systems are negative, except as otherwise noted.      Objective    /72 (BP Location: Right arm, Patient Position: Sitting, Cuff Size: Adult Regular)   Pulse 76   Temp 96.9  F (36.1  C) (Tympanic)   Resp 14   Ht 1.829 m (6')   Wt 91 kg (200 lb 11.2 oz)   SpO2 96%   BMI 27.22 kg/m    Body mass index is 27.22 kg/m .  Physical Exam   GENERAL: alert and no distress  EYES: Eyes grossly normal to inspection, PERRL and conjunctivae and sclerae normal  NECK: no adenopathy, no asymmetry, masses, or scars  RESP: lungs clear to auscultation - no rales, rhonchi or wheezes  CV: regular rate and rhythm, normal S1 S2, no S3 or S4, no murmur, click or rub, no peripheral edema  ABDOMEN: soft, nontender, no hepatosplenomegaly, no masses  MS: no gross musculoskeletal defects noted, no edema  SKIN: Sutures intact right sided chest. 4.  No bleeding or signs of overlying infection.  Bruising left forearm/bicep.  No active bleeding no suspicious lesions or rashes  NEURO: Normal strength and tone, mentation intact and speech normal  PSYCH: mentation appears normal, affect normal/bright    The likelihood of other entities in the differential is insufficient to justify any further testing for them at this time. This was explained to the patient. The patient was advised that persistent or worsening  symptoms would require further evaluation. Patient advised to call the office and if unable to reach to go to the emergency room if they develop any new or worsening symptoms. Expressed understanding and agreement with above stated plan.         Signed Electronically by: Ki Carter PA-C

## 2024-05-10 ENCOUNTER — MYC MEDICAL ADVICE (OUTPATIENT)
Dept: ANTICOAGULATION | Facility: CLINIC | Age: 59
End: 2024-05-10
Payer: COMMERCIAL

## 2024-05-10 NOTE — RESULT ENCOUNTER NOTE
Jeff Simeon,     Blood counts are improving from recent hospital stay.  Lets plan to repeat in ~2-3 weeks.  I will place the order.    Ki Carter PA-C  [facility]

## 2024-05-13 LAB
LACOSAMIDE SERPL-MCNC: 5.8 UG/ML
PROTOCOL CUTOFF: NORMAL
SA 1  COMMENTS: NORMAL
SA 1 CELL: NORMAL
SA 1 TEST METHOD: NORMAL
SA 2 CELL: NORMAL
SA 2 COMMENTS: NORMAL
SA 2 TEST METHOD: NORMAL
SA1 HI RISK ABY: NORMAL
SA1 MOD RISK ABY: NORMAL
SA2 HI RISK ABY: NORMAL
SA2 MOD RISK ABY: NORMAL
UNACCEPTABLE ANTIGENS: NORMAL
UNOS CPRA: 62

## 2024-05-14 ENCOUNTER — TELEPHONE (OUTPATIENT)
Dept: TRANSPLANT | Facility: CLINIC | Age: 59
End: 2024-05-14
Payer: COMMERCIAL

## 2024-05-14 ENCOUNTER — TELEPHONE (OUTPATIENT)
Dept: ANTICOAGULATION | Facility: CLINIC | Age: 59
End: 2024-05-14
Payer: COMMERCIAL

## 2024-05-14 ENCOUNTER — HOSPITAL ENCOUNTER (OUTPATIENT)
Dept: CARDIAC REHAB | Facility: CLINIC | Age: 59
Discharge: HOME | End: 2024-05-14
Attending: STUDENT IN AN ORGANIZED HEALTH CARE EDUCATION/TRAINING PROGRAM
Payer: COMMERCIAL

## 2024-05-14 DIAGNOSIS — Z79.01 ANTICOAGULATED: Primary | ICD-10-CM

## 2024-05-14 DIAGNOSIS — Z76.82 ORGAN TRANSPLANT CANDIDATE: ICD-10-CM

## 2024-05-14 DIAGNOSIS — I48.91 ATRIAL FIBRILLATION WITH RAPID VENTRICULAR RESPONSE (H): ICD-10-CM

## 2024-05-14 PROCEDURE — 93798 PHYS/QHP OP CAR RHAB W/ECG: CPT

## 2024-05-14 NOTE — TELEPHONE ENCOUNTER
Dr. Abreu,    Your patient, Blanco Osborne, is under the care of [facility]ticoagulation. Previously referred by Marlon Mcnally NP.  Anticoagulation clinic needing a new referring provider due to Needs to be followed by cardiology while transitioning to Warfarin.      Most importantly, please review recent platelet count, and address in anticoagulation referral.       Indication(s):   Anticoagulated [Z79.01]  - Primary      Organ transplant candidate [Z76.82]          Goal Range: 2.0-3.0  Duration: indefinite     Anticoagulation clinic requires a referral from one of Blanco's [facility] ambulatory provider (PCP or specialist) in order to provide anticoagulation management. The referring provider should anticipate seeing the patient on an ongoing basis, will have INRs and warfarin Rx ordered under their name per protocol, and will be point of contact for [facility] questions on patient's anticoagulation care (procedural holds, critical results, etc).     Please review and sign the pended referral order for Blanco Osborne if you are willing to oversee anticoagulation care.         Thank you,  Zaheer Erwin, RN  Anticoagulation clinic

## 2024-05-14 NOTE — TELEPHONE ENCOUNTER
Called pt to discuss listing for kidney transplant, he understands he needs to see the Anticoagulation clinic. I let him know the phone number was sent to him via [employer], he agreed to call them to start the process of transitioning to Coumadin and had no further questions.

## 2024-05-15 ENCOUNTER — ANTICOAGULATION THERAPY VISIT (OUTPATIENT)
Dept: ANTICOAGULATION | Facility: CLINIC | Age: 59
End: 2024-05-15
Payer: COMMERCIAL

## 2024-05-15 DIAGNOSIS — Z79.01 ANTICOAGULATED: ICD-10-CM

## 2024-05-15 DIAGNOSIS — Z76.82 ORGAN TRANSPLANT CANDIDATE: Primary | ICD-10-CM

## 2024-05-15 DIAGNOSIS — I48.91 ATRIAL FIBRILLATION WITH RAPID VENTRICULAR RESPONSE (H): ICD-10-CM

## 2024-05-15 RX ORDER — WARFARIN SODIUM 2 MG/1
4 TABLET ORAL DAILY
Qty: 60 TABLET | Refills: 0 | Status: SHIPPED | OUTPATIENT
Start: 2024-05-15 | End: 2024-06-19

## 2024-05-15 NOTE — PROGRESS NOTES
5/15/24    See instructions from Sharon to transition from Eliquis to Warfarin.  Will need to educate patient on Warfarin.  At time of encounter, Blanco was not feeling well and was unable to receive education.  Writer sent prescription for Warfarin to preferred pharmacy, mailed out packet, and briefly went over the overlap of Eliquis & Warfarin.    Patient appreciated writer's accomodation with education today.  Sent message to [facility] to follow up with Blanco on Friday to educate him on Warfarin and set up INR test for 4-5 days after he starts Warfarin.    Zaheer ErwinRN

## 2024-05-15 NOTE — PROGRESS NOTES
RECOMMENDATION      Start warfarin 4 mg daily (use 2 mg tablets for dose titration)  Overlap with Apixaban (Eliquis) 3 days. Check INR just before due for next DOAC dose and/or hold off on DOAC dose until after INR drawn on lab days. Revisit further overlap based on INR results     Check INR 4-5 days after starting warfarin     Sharon Mccall, [facility]  Anticoagulation Clinic

## 2024-05-15 NOTE — TELEPHONE ENCOUNTER
Patient has not returned phone calls to the Surgery Scheduler.    Patient is scheduled for an ultrasound and for an appointment with Dr. Mcneil on 6/13.    Raquel holding surgery form in flag file.

## 2024-05-17 ENCOUNTER — TELEPHONE (OUTPATIENT)
Dept: TRANSPLANT | Facility: CLINIC | Age: 59
End: 2024-05-17
Payer: COMMERCIAL

## 2024-05-17 ENCOUNTER — DOCUMENTATION ONLY (OUTPATIENT)
Dept: TRANSPLANT | Facility: CLINIC | Age: 59
End: 2024-05-17
Payer: COMMERCIAL

## 2024-05-17 NOTE — TELEPHONE ENCOUNTER
Called pt and informed him that he will be listed ACTIVE status on the kidney wait list as of today (5/17/24). Pt is now working w/ anticoagulation clinic and being transitioned to Coumadin. Verified pt's contact information, confirmed and updated phone contact priorities.  Explained to pt they will need to keep their phone on, charged, and answer calls from 24/7 (even if the number is not familiar).  Explained the pre-transplant process from [facility] to receiving an organ offer.  Informed the pt their new coordinator will be PETER Thomas and provided her contact information.  Informed the pt to notify the [facility] coordinator if they plan to travel internationally, undergo surgery, receive a blood transfusion, or are having any symptoms of an active infection.  Explained that the pt will need to provide PRA samples every 3 months - pt's dialysis unit has the order and kit, pt aware he is due again 8/1/24.  Pt had no further questions for me and expressed good understanding of the plan. Active listing letter sent. Hand off to [facility].

## 2024-05-17 NOTE — PROGRESS NOTES
"ANTICOAGULATION  MANAGEMENT: NEW REFERRAL      SUBJECTIVE/OBJECTIVE     Blanco Osborne, a 58 year old male  is newly referred to [facility]ticoagulation Clinic.    Anticoagulation:    Previously on warfarin: No, has been on Apixaban (Eliquis); transitioning to warfarin.  Warfarin initiation date (approximate): 5/17/24   Indication(s):  Organ transplant listed   Goal Range: 2.0-3.0   Anticoagulation Bridge/Overlap: Yes, overlapping with Apixaban (Eliquis) until INR >= 2.0   Referring provider:  Transplant team    General Dietary/Social Hx:    Typical vitamin K intake: low; consistent     Other dietary considerations: None     Social History:   Social History     Tobacco Use    Smoking status: Never    Smokeless tobacco: Never   Vaping Use    Vaping status: Never Used   Substance Use Topics    Alcohol use: Not Currently     Alcohol/week: 0.0 standard drinks of alcohol    Drug use: No       In the past 2 weeks, patient estimates taking medications as instructed % of time: 100    Results:        No results for input(s): \"INR\", \"[id number]\", \"F2\", \"ALMWH\" in the last 168 hours.    Wt Readings from Last 2 Encounters:   05/09/24 91 kg (200 lb 11.2 oz)   04/27/24 88.5 kg (195 lb)      Estimated body mass index is 27.22 kg/m  as calculated from the following:    Height as of 5/9/24: 1.829 m (6').    Weight as of 5/9/24: 91 kg (200 lb 11.2 oz).  Lab Results   Component Value Date    AST 28 01/11/2024    ALT <5 01/11/2024    ALBUMIN 4.1 01/11/2024     Lab Results   Component Value Date    CR 8.05 (H) 04/28/2024     Estimated Creatinine Clearance: 12.7 mL/min (A) (based on SCr of 8.05 mg/dL (H)).    ASSESSMENT     Goal INR 2-3, standard for indication(s) above  Establishing initial warfarin maintenance dose (on warfarin < 30 days)   Factors that may increase sensitivity to warfarin: Kidney Disease  Factors that may reduce sensitivity to warfarin: Male Gender  Potential significant drug interactions with home " medications: None noted  Starting warfarin dose is appropriate for patient's anticipated sensitivity to warfarin    PLAN     Dosing Instructions: Start warfarin with INR in 4 days       Summary  As of 5/15/2024      Full warfarin instructions:  5/15: Hold; 5/16: Hold; Otherwise 4 mg every day   Next INR check:  5/21/2024               Education provided:   Please call back if any changes to your diet, medications or how you've been taking warfarin  Taking warfarin: purpose of warfarin and how it works, take warfarin at same time each day; preferably in the evening, prescribed tablet strength and color, importance of following [facility] instructions vs instructions on the prescription bottle, and Importance of taking warfarin as instructed  Goal range and lab monitoring: goal range and significance of current result, Importance of therapeutic range, Importance of following up at instructed interval, and frequency of lab work when starting warfarin and importance of following up when instructed (extends after stability established)  Dietary considerations: importance of consistent vitamin K intake, vitamin K content of foods, and importance of notifying [facility] to changes in diet  Symptom monitoring: monitoring for bleeding signs and symptoms, monitoring for clotting signs and symptoms, monitoring for stroke signs and symptoms, when to seek medical attention/emergency care, and if you hit your head or have a bad fall seek emergency care  Importance of notifying anticoagulation clinic for: changes in medications; a sooner lab recheck maybe needed, diarrhea, nausea/vomiting, reduced intake, cold/flu, and/or infections; a sooner lab recheck maybe needed, and upcoming surgeries and procedures 2 weeks in advance  Contact [phone] with any changes, questions or concerns.     Education still needed:   Please call back if any changes to your diet, medications or how you've been taking warfarin      Telephone call with Blanco centeno  verbalizes understanding and agrees to plan  Sent [employer] message with dosing and follow up instructions    Lab visit scheduled    Standing orders placed in Epic: Point of Care INR (Lab 5000) and Venous INR  (Lab 3572)    Plan made with [facility] Pharmacist Zaheer Saleem, RN  Anticoagulation Clinic  5/17/2024

## 2024-05-17 NOTE — LETTER
May 17, 2024    Blanco Osborne  [address]  [facility] [address]      Dear Mr. Osborne,    This letter is sent to confirm that you have completed your transplant work-up and you are a candidate in the kidney transplant program at the [facility].  You were placed on the kidney active waitlist on 24.      When you are active on the waitlist and an organ becomes available, a coordinator will need to speak to you immediately.  You could be contacted at any time during the day or night as an organ could become available at any time.  Please make certain our office always has your current telephone numbers and address.      Items we will need from you:    We have received approval from your insurance company for the transplant procedure.  It is critical that you notify us if there is any change in your insurance.  It is also important that you familiarize yourself with the details of your specific insurance policy.  Our patient  is available to assist you if you should have any questions regarding your coverage.    An ALA or PRA blood sample will need to be sent here every 3 months to match you with  donors or any potential living donors. Special mailing boxes (called mailers) have been sent to your dialysis unit directly from the Outreach Department.  Your next sample is due on 24, please ensure your dialysis RN draws it.  Additional mailers can be obtained by calling the Transplant Office and asking to speak to a kidney .    During this waiting period, we may request additional periodic laboratory tests with your primary physician.  It will be your responsibility to remind your physician to forward your results to the Transplant Office.    We need to be kept informed of any changes in your medical condition such as:    changes in your medications,   significant changes in your  health  significant infections (such as pneumonia or abscesses)  blood transfusions  any condition which requires hospitalization  any surgery    Remember to complete any routine cancer screening tests required before your transplant.  This includes colonoscopy; prostate screening for men, and mammogram and gynecologic testing for women, as well as dental work.  Your primary care clinic can assist you with arranging for these exams.  Remind your caregivers to forward copies of the records and final reports.      We want you to know that our program has physician and surgeon coverage 24 hours a day, 365 days a year. In addition, our transplant surgeons and physicians will not be on call for two or more transplant programs more than 30 miles apart unless the circumstances have been reviewed and approved by the [employer] Organ [employer]aring (UNOS) Membership and Professional Standards Committee (MPSC). Finally, our primary physician and primary surgeons are not designated as the primary surgeon or primary physician at more than 1 transplant hospital. If this coverage changes or there are substantial program changes, you will be notified in writing by letter.     Attached is a letter from UNOS that describes the services and information offered to patients by UNOS and the Organ Procurement and Transplantation Network (OPTN).    We appreciate having had the opportunity to participate in your care.  If you have questions, please feel free to call the Transplant Office at [phone] or [phone].  Per our normal office work flow, your new wait list coordinator will be PETER Thomas, with the assistance of NEVA Auguste. Martha can be reached at [phone].      Sincerely,  Tamara Abel RN, Pre-Kidney/Pancreas Transplant Coordinator   Solid Organ Transplant  [facility], [facility]      Enclosures: ALA/PRA Physician Order,  Telephone Contact List, Travel Resources, UNOS Letter, Waitlist Information Update, and While You Are Waiting  cc: Care Team                  The Organ Procurement and Transplantation Network Toll-free patient services line:  [phone]  Your resource for organ transplant information    Staffed 8:30 am - 5:00 pm ET Monday - Friday Leave a message 24/7 to receive a call back    The Organ Procurement and Transplantation Network (OPTN) is the national transplant system. It makes the policies that decide how donated organs are matched to patients waiting for a transplant. The OPTN:    Makes sure donated organs get matched to people on the transplant waiting list  Tells people about the donation and transplant processes  Makes sure that the public knows about the need for more organ and tissue donations    The OPTN has a free patient services line that you can call to:  Get more information about:  Organ donation and organ transplants  Donation and transplant policies  Get an information kit with:  A list of transplant hospitals  Waiting list information  Talk about any questions you may have about your transplant hospital or organ procurement organization. The staff will do their best to help you or point you to others who may help.  Find out how you can volunteer with the OPTN and help shape transplant policy     The patient services line number is: 3-654-925-6740    Patient services line staff CANNOT answer questions about your own medical care, including:  Waiting list status  Test results  Medical records  You will need to call your transplant hospital for this information.    The following websites have more information about transplantation and donation:    OPTN: https://optn.transplant.hrsa.gov/    For potential living donors and transplant recipients:    Living with transplant: https://www.transplantliving.org/    Living donation process: https://optn.transplant.hrsa.gov/living-donation/    Financial  assistance: https://www.livingdonorassistance.org/    Transplantation data: https://www.srtr.org/    Organ donation: https://www.organdonor.gov/    Volunteer with the OPTN: https://optn.transplant.[facility].gov/get-involved/

## 2024-05-21 ENCOUNTER — ANTICOAGULATION THERAPY VISIT (OUTPATIENT)
Dept: ANTICOAGULATION | Facility: CLINIC | Age: 59
End: 2024-05-21

## 2024-05-21 ENCOUNTER — HOSPITAL ENCOUNTER (OUTPATIENT)
Dept: CARDIAC REHAB | Facility: CLINIC | Age: 59
Discharge: HOME | End: 2024-05-21
Attending: STUDENT IN AN ORGANIZED HEALTH CARE EDUCATION/TRAINING PROGRAM
Payer: COMMERCIAL

## 2024-05-21 ENCOUNTER — LAB (OUTPATIENT)
Dept: LAB | Facility: CLINIC | Age: 59
End: 2024-05-21
Payer: COMMERCIAL

## 2024-05-21 DIAGNOSIS — I48.91 ATRIAL FIBRILLATION WITH RAPID VENTRICULAR RESPONSE (H): ICD-10-CM

## 2024-05-21 DIAGNOSIS — Z76.82 ORGAN TRANSPLANT CANDIDATE: Primary | ICD-10-CM

## 2024-05-21 DIAGNOSIS — Z79.01 ANTICOAGULATED: ICD-10-CM

## 2024-05-21 DIAGNOSIS — Z76.82 ORGAN TRANSPLANT CANDIDATE: ICD-10-CM

## 2024-05-21 LAB — INR BLD: 1.2 (ref 0.9–1.1)

## 2024-05-21 PROCEDURE — 36416 COLLJ CAPILLARY BLOOD SPEC: CPT

## 2024-05-21 PROCEDURE — 85610 PROTHROMBIN TIME: CPT

## 2024-05-21 PROCEDURE — 93798 PHYS/QHP OP CAR RHAB W/ECG: CPT

## 2024-05-21 NOTE — PROGRESS NOTES
ANTICOAGULATION MANAGEMENT     Blanco Osborne 58 year old male is on warfarin with subtherapeutic INR result. (Goal INR 2.0-3.0)    Recent labs: (last 7 days)     05/21/24  1108   INR 1.2*       ASSESSMENT     Source(s): Chart Review and Patient/Caregiver Call     Warfarin doses taken: Warfarin taken as instructed  Diet: No new diet changes identified  Medication/supplement changes: None noted  New illness, injury, or hospitalization: No  Signs or symptoms of bleeding or clotting: No  Previous result:  New start  Additional findings: New start on day 5 of warfarin. Per KD patient should continue with Eliquis 5/21 and 5/22/24 and then stop       PLAN     Recommended plan for no diet, medication or health factor changes affecting INR     Dosing Instructions: booster dose then Increase your warfarin dose (21.4% change) with next INR in 3 days       Summary  As of 5/21/2024      Full warfarin instructions:  5/21: 6 mg; Otherwise 6 mg every Mon, Wed, Fri; 4 mg all other days   Next INR check:  5/24/2024               Telephone call with Blanco who verbalizes understanding and agrees to plan and who agrees to plan and repeated back plan correctly    Lab visit scheduled    Education provided:   Taking warfarin: Importance of taking warfarin as instructed  Goal range and lab monitoring: goal range and significance of current result, Importance of therapeutic range, and Importance of following up at instructed interval    Plan made per [facility] anticoagulation protocol    Frances Ward RN  Anticoagulation Clinic  5/21/2024    _______________________________________________________________________     Anticoagulation Episode Summary       Current INR goal:  2.0-3.0   TTR:  --   Target end date:  Indefinite   Send INR reminders to:  [facility]    Indications    Organ transplant candidate [Z76.82]  Anticoagulated [Z79.01]  Atrial fibrillation with rapid ventricular response (H) [I48.91]             Comments:  please  review 5/3/24 encounter, pending 2mg Warfarin tablets, transition from Eliquis to Warfarin, need to talk to patient             Anticoagulation Care Providers       Provider Role Specialty Phone number    Marlon Salmon APRN CNP Referring Nephrology [phone]    Kristina Abreu MD Referring Cardiovascular Disease [phone]

## 2024-05-23 ENCOUNTER — HOSPITAL ENCOUNTER (OUTPATIENT)
Dept: CARDIAC REHAB | Facility: CLINIC | Age: 59
Discharge: HOME | End: 2024-05-23
Attending: STUDENT IN AN ORGANIZED HEALTH CARE EDUCATION/TRAINING PROGRAM
Payer: COMMERCIAL

## 2024-05-23 PROCEDURE — 93798 PHYS/QHP OP CAR RHAB W/ECG: CPT

## 2024-05-24 ENCOUNTER — ANTICOAGULATION THERAPY VISIT (OUTPATIENT)
Dept: ANTICOAGULATION | Facility: CLINIC | Age: 59
End: 2024-05-24

## 2024-05-24 ENCOUNTER — LAB (OUTPATIENT)
Dept: LAB | Facility: CLINIC | Age: 59
End: 2024-05-24
Payer: COMMERCIAL

## 2024-05-24 DIAGNOSIS — Z76.82 ORGAN TRANSPLANT CANDIDATE: Primary | ICD-10-CM

## 2024-05-24 DIAGNOSIS — I48.91 ATRIAL FIBRILLATION WITH RAPID VENTRICULAR RESPONSE (H): ICD-10-CM

## 2024-05-24 DIAGNOSIS — Z76.82 ORGAN TRANSPLANT CANDIDATE: ICD-10-CM

## 2024-05-24 DIAGNOSIS — Z79.01 ANTICOAGULATED: ICD-10-CM

## 2024-05-24 LAB — INR BLD: 1.2 (ref 0.9–1.1)

## 2024-05-24 PROCEDURE — 85610 PROTHROMBIN TIME: CPT

## 2024-05-24 PROCEDURE — 36416 COLLJ CAPILLARY BLOOD SPEC: CPT

## 2024-05-24 NOTE — PROGRESS NOTES
"ANTICOAGULATION MANAGEMENT     Blanco Osborne 58 year old male is on warfarin with subtherapeutic INR result. (Goal INR 2.0-3.0)    Recent labs: (last 7 days)     05/24/24  1205   INR 1.2*       ASSESSMENT     Source(s): Chart Review and Patient/Caregiver Call     Warfarin doses taken: Less warfarin taken than planned which may be affecting INR - did not take booster dose or increase maintenance dosing as discussed 5/21/24 and continued taking 4 mg daily \"That's what it says to take on the bottle\" - Instructed that Warfarin dosing may change with each INR while trying to get his INR in therapeutic range and to follow instructions from [facility] instead of what is on the bottle.  Diet: No new diet changes identified  Medication/supplement changes:  Stopped Eliquis after 5/22/24 as instructed.  New illness, injury, or hospitalization: No  Signs or symptoms of bleeding or clotting: No - denies signs of clotting/stroke.  Previous result: Subtherapeutic - last [facility] encounter 5/21, 6 mg booster and then 21.4% maintenance dose increase instructed.  Additional findings: New start on day 8 of warfarin       PLAN     Recommended plan for ongoing change(s) affecting INR     Dosing Instructions: Increase your warfarin dose (28.6% change) with next INR in 5 days       Summary  As of 5/24/2024      Full warfarin instructions:  4 mg every Sun, Tue, Thu; 6 mg all other days   Next INR check:  5/29/2024               Telephone call with Blanco who agrees to plan and repeated back plan correctly    Lab visit scheduled    Education provided:   Taking warfarin: purpose of warfarin and how it works, importance of following [facility] instructions vs instructions on the prescription bottle, and Importance of taking warfarin as instructed  Goal range and lab monitoring: goal range and significance of current result, Importance of therapeutic range, Importance of following up at instructed interval, and frequency of lab work when starting warfarin and " importance of following up when instructed (extends after stability established)  Dietary considerations: importance of consistent vitamin K intake  Symptom monitoring: monitoring for clotting signs and symptoms and monitoring for stroke signs and symptoms  Contact [phone] with any changes, questions or concerns.     Plan made with [facility] Pharmacist Aby Soliman, RN  Anticoagulation Clinic  5/24/2024    _______________________________________________________________________     Anticoagulation Episode Summary       Current INR goal:  2.0-3.0   TTR:  0.0% (3 d)   Target end date:  Indefinite   Send INR reminders to:   ANTICOAG CLINIC    Indications    Organ transplant candidate [Z76.82]  Anticoagulated [Z79.01]  Atrial fibrillation with rapid ventricular response (H) [I48.91]             Comments:  please review 5/3/24 encounter, pending 2mg Warfarin tablets, transition from Eliquis to Warfarin, need to talk to patient             Anticoagulation Care Providers       Provider Role Specialty Phone number    Marlon Salmon APRN CNP Referring Nephrology [phone]    Kristina Abreu MD Referring Cardiovascular Disease [phone]

## 2024-05-25 DIAGNOSIS — G47.00 INSOMNIA, UNSPECIFIED TYPE: ICD-10-CM

## 2024-05-28 ENCOUNTER — HOSPITAL ENCOUNTER (OUTPATIENT)
Dept: CARDIAC REHAB | Facility: CLINIC | Age: 59
Discharge: HOME | End: 2024-05-28
Attending: STUDENT IN AN ORGANIZED HEALTH CARE EDUCATION/TRAINING PROGRAM
Payer: COMMERCIAL

## 2024-05-28 PROCEDURE — 93798 PHYS/QHP OP CAR RHAB W/ECG: CPT | Performed by: OCCUPATIONAL THERAPIST

## 2024-05-28 RX ORDER — TRAZODONE HYDROCHLORIDE 50 MG/1
50 TABLET, FILM COATED ORAL AT BEDTIME
Qty: 90 TABLET | Refills: 1 | OUTPATIENT
Start: 2024-05-28

## 2024-05-30 ENCOUNTER — TELEPHONE (OUTPATIENT)
Dept: ANTICOAGULATION | Facility: CLINIC | Age: 59
End: 2024-05-30
Payer: COMMERCIAL

## 2024-05-30 NOTE — TELEPHONE ENCOUNTER
ANTICOAGULATION     Blanco Osborne is overdue for an INR check.     Spoke with Blanco and scheduled lab appointment on 5/31/24    Frances Ward RN

## 2024-05-31 ENCOUNTER — ANTICOAGULATION THERAPY VISIT (OUTPATIENT)
Dept: ANTICOAGULATION | Facility: CLINIC | Age: 59
End: 2024-05-31

## 2024-05-31 ENCOUNTER — LAB (OUTPATIENT)
Dept: LAB | Facility: CLINIC | Age: 59
End: 2024-05-31
Payer: COMMERCIAL

## 2024-05-31 DIAGNOSIS — Z76.82 ORGAN TRANSPLANT CANDIDATE: Primary | ICD-10-CM

## 2024-05-31 DIAGNOSIS — Z79.01 ANTICOAGULATED: ICD-10-CM

## 2024-05-31 DIAGNOSIS — Z94.4 LIVER TRANSPLANTED (H): ICD-10-CM

## 2024-05-31 DIAGNOSIS — I48.91 ATRIAL FIBRILLATION WITH RAPID VENTRICULAR RESPONSE (H): ICD-10-CM

## 2024-05-31 DIAGNOSIS — Z76.82 AWAITING ORGAN TRANSPLANT: ICD-10-CM

## 2024-05-31 DIAGNOSIS — Z94.4 LIVER REPLACED BY TRANSPLANT (H): ICD-10-CM

## 2024-05-31 DIAGNOSIS — Z99.2 ESRD (END STAGE RENAL DISEASE) ON DIALYSIS (H): ICD-10-CM

## 2024-05-31 DIAGNOSIS — Z76.82 ORGAN TRANSPLANT CANDIDATE: ICD-10-CM

## 2024-05-31 DIAGNOSIS — N18.6 ESRD (END STAGE RENAL DISEASE) ON DIALYSIS (H): ICD-10-CM

## 2024-05-31 LAB — INR BLD: 2.6 (ref 0.9–1.1)

## 2024-05-31 PROCEDURE — 36416 COLLJ CAPILLARY BLOOD SPEC: CPT

## 2024-05-31 PROCEDURE — 85610 PROTHROMBIN TIME: CPT

## 2024-05-31 NOTE — PROGRESS NOTES
ANTICOAGULATION MANAGEMENT     Blanco Osborne 58 year old male is on warfarin with therapeutic INR result. (Goal INR 2.0-3.0)    Recent labs: (last 7 days)     05/31/24  1147   INR 2.6*       ASSESSMENT     Source(s): Chart Review and Patient/Caregiver Call     Warfarin doses taken: Warfarin taken as instructed  Diet: No new diet changes identified  Medication/supplement changes: None noted  New illness, injury, or hospitalization: No  Signs or symptoms of bleeding or clotting: No  Previous result: Subtherapeutic  Additional findings:  Started warfarin 2weeks ago. Had large MD increase last week - 28%. Requested pt do another INR check Wednesday - pt prefers Fridays so that it with his Dialysis runs.        PLAN     Recommended plan for temporary change(s) affecting INR     Dosing Instructions: Continue your current warfarin dose with next INR in 1 week       Summary  As of 5/31/2024      Full warfarin instructions:  4 mg every Sun, Tue, Thu; 6 mg all other days   Next INR check:  6/7/2024               Telephone call with Blanco who verbalizes understanding and agrees to plan and who agrees to plan and repeated back plan correctly    Lab visit scheduled    Education provided:   Please call back if any changes to your diet, medications or how you've been taking warfarin    Plan made per [facility] anticoagulation protocol    Jamila Dunaway RN  Anticoagulation Clinic  5/31/2024    _______________________________________________________________________     Anticoagulation Episode Summary       Current INR goal:  2.0-3.0   TTR:  29.9% (1.4 wk)   Target end date:  Indefinite   Send INR reminders to:  [facility]    Indications    Organ transplant candidate [Z76.82]  Anticoagulated [Z79.01]  Atrial fibrillation with rapid ventricular response (H) [I48.91]             Comments:  please review 5/3/24 encounter, pending 2mg Warfarin tablets, transition from Eliquis to Warfarin, need to talk to patient              Anticoagulation Care Providers       Provider Role Specialty Phone number    Marlon Salmon APRN CNP Referring Nephrology [phone]    Kristina Abreu MD Referring Cardiovascular Disease [phone]

## 2024-06-01 ENCOUNTER — HEALTH MAINTENANCE LETTER (OUTPATIENT)
Age: 59
End: 2024-06-01

## 2024-06-10 ENCOUNTER — TELEPHONE (OUTPATIENT)
Dept: ANTICOAGULATION | Facility: CLINIC | Age: 59
End: 2024-06-10
Payer: COMMERCIAL

## 2024-06-10 NOTE — TELEPHONE ENCOUNTER
ANTICOAGULATION     Blanco Osborne is overdue for an INR check.     Spoke with Blanco and scheduled lab appointment on 6/13/24    Frances Ward RN

## 2024-06-11 ENCOUNTER — HOSPITAL ENCOUNTER (OUTPATIENT)
Dept: CARDIAC REHAB | Facility: CLINIC | Age: 59
Discharge: HOME | End: 2024-06-11
Attending: STUDENT IN AN ORGANIZED HEALTH CARE EDUCATION/TRAINING PROGRAM
Payer: COMMERCIAL

## 2024-06-11 PROCEDURE — 93798 PHYS/QHP OP CAR RHAB W/ECG: CPT

## 2024-06-13 ENCOUNTER — HOSPITAL ENCOUNTER (OUTPATIENT)
Dept: ULTRASOUND IMAGING | Facility: CLINIC | Age: 59
Discharge: HOME | End: 2024-06-13
Payer: COMMERCIAL

## 2024-06-13 ENCOUNTER — OFFICE VISIT (OUTPATIENT)
Dept: OTHER | Facility: CLINIC | Age: 59
End: 2024-06-13
Attending: SURGERY
Payer: COMMERCIAL

## 2024-06-13 ENCOUNTER — HOSPITAL ENCOUNTER (OUTPATIENT)
Dept: CARDIAC REHAB | Facility: CLINIC | Age: 59
Discharge: HOME | End: 2024-06-13
Attending: STUDENT IN AN ORGANIZED HEALTH CARE EDUCATION/TRAINING PROGRAM
Payer: COMMERCIAL

## 2024-06-13 VITALS — SYSTOLIC BLOOD PRESSURE: 115 MMHG | DIASTOLIC BLOOD PRESSURE: 76 MMHG | HEART RATE: 69 BPM

## 2024-06-13 DIAGNOSIS — T82.858A STENOSIS OF OTHER VASCULAR PROSTHETIC DEVICES, IMPLANTS AND GRAFTS, INITIAL ENCOUNTER (H): ICD-10-CM

## 2024-06-13 DIAGNOSIS — N18.6 ESRD (END STAGE RENAL DISEASE) ON DIALYSIS (H): ICD-10-CM

## 2024-06-13 DIAGNOSIS — Z99.2 ESRD (END STAGE RENAL DISEASE) ON DIALYSIS (H): ICD-10-CM

## 2024-06-13 DIAGNOSIS — T82.838D: ICD-10-CM

## 2024-06-13 DIAGNOSIS — I77.0 ARTERIOVENOUS FISTULA (H): Primary | ICD-10-CM

## 2024-06-13 PROCEDURE — 93990 DOPPLER FLOW TESTING: CPT

## 2024-06-13 PROCEDURE — 93798 PHYS/QHP OP CAR RHAB W/ECG: CPT | Performed by: REHABILITATION PRACTITIONER

## 2024-06-13 PROCEDURE — 99213 OFFICE O/P EST LOW 20 MIN: CPT | Performed by: SURGERY

## 2024-06-13 PROCEDURE — G0463 HOSPITAL OUTPT CLINIC VISIT: HCPCS | Performed by: SURGERY

## 2024-06-13 NOTE — PROGRESS NOTES
[facility]        Patient is here for a  follow up.    Pt is currently taking Aspirin and Warfarin.    /76 (BP Location: Right arm, Patient Position: Chair, Cuff Size: Adult Regular)   Pulse 69     The provider has been notified that the patient has no concerns.     Questions patient would like addressed today are: N/A.    Refills are needed: N/A    Has homecare services and agency name:  Parul Valdez MA

## 2024-06-19 ENCOUNTER — TELEPHONE (OUTPATIENT)
Dept: ANTICOAGULATION | Facility: CLINIC | Age: 59
End: 2024-06-19
Payer: COMMERCIAL

## 2024-06-19 DIAGNOSIS — Z76.82 ORGAN TRANSPLANT CANDIDATE: ICD-10-CM

## 2024-06-19 DIAGNOSIS — Z79.01 ANTICOAGULATED: ICD-10-CM

## 2024-06-19 DIAGNOSIS — J90 PLEURAL EFFUSION: ICD-10-CM

## 2024-06-19 DIAGNOSIS — I63.9 EMBOLIC STROKE (H): ICD-10-CM

## 2024-06-19 RX ORDER — WARFARIN SODIUM 2 MG/1
4 TABLET ORAL DAILY
Qty: 60 TABLET | Refills: 0 | OUTPATIENT
Start: 2024-06-19

## 2024-06-19 RX ORDER — WARFARIN SODIUM 2 MG/1
TABLET ORAL
Qty: 252 TABLET | Refills: 1 | Status: SHIPPED | OUTPATIENT
Start: 2024-06-19 | End: 2024-09-26

## 2024-06-19 NOTE — TELEPHONE ENCOUNTER
ANTICOAGULATION     Blanco Osborne is overdue for an INR check.     Patient reports that he has been out of warfarin for at least a week.  Writer notifies patient that refill was approved today. Patient agrees to  refill today.  Blanco reports taking 2 tablets of warfarin 3 days a week and 3 tablets 4 days a week.  Appointment is scheduled today.     Frances Ward RN

## 2024-06-19 NOTE — TELEPHONE ENCOUNTER
ANTICOAGULATION MANAGEMENT:  Medication Refill    Anticoagulation Summary  As of 5/31/2024      Warfarin maintenance plan:  4 mg (2 mg x 2) every Sun, Tue, Thu; 6 mg (2 mg x 3) all other days   Next INR check:  6/7/2024   Target end date:  Indefinite    Indications    Organ transplant candidate [Z76.82]  Anticoagulated [Z79.01]  Atrial fibrillation with rapid ventricular response (H) [I48.91]                 Anticoagulation Care Providers       Provider Role Specialty Phone number    Marlon Salmon APRN CNP Referring Nephrology [phone]    Kristina Abreu MD Referring Cardiovascular Disease [phone]            Refill Criteria    Visit with referring provider/group: Meets criteria: office visit within referring provider group in the last 1 year on 5/9/24    [facility] referral last signed: 05/14/2024; within last year: Yes    Lab monitoring not exceeding 2 weeks overdue: Yes    Blanco meets all criteria for refill. Rx instructions and quantity supplied updated to match patient's current dosing plan. Warfarin 90 day supply with 1 refill granted per [facility] protocol     CATIE WATSON, RN  Anticoagulation Clinic

## 2024-06-25 ENCOUNTER — HOSPITAL ENCOUNTER (OUTPATIENT)
Dept: CARDIAC REHAB | Facility: CLINIC | Age: 59
Discharge: HOME | End: 2024-06-25
Attending: STUDENT IN AN ORGANIZED HEALTH CARE EDUCATION/TRAINING PROGRAM
Payer: COMMERCIAL

## 2024-06-25 PROCEDURE — 93798 PHYS/QHP OP CAR RHAB W/ECG: CPT

## 2024-06-27 ENCOUNTER — HOSPITAL ENCOUNTER (OUTPATIENT)
Dept: CARDIAC REHAB | Facility: CLINIC | Age: 59
Discharge: HOME | End: 2024-06-27
Attending: STUDENT IN AN ORGANIZED HEALTH CARE EDUCATION/TRAINING PROGRAM
Payer: COMMERCIAL

## 2024-06-27 PROCEDURE — 93798 PHYS/QHP OP CAR RHAB W/ECG: CPT

## 2024-06-28 ENCOUNTER — OFFICE VISIT (OUTPATIENT)
Dept: GASTROENTEROLOGY | Facility: CLINIC | Age: 59
End: 2024-06-28
Attending: INTERNAL MEDICINE
Payer: COMMERCIAL

## 2024-06-28 VITALS
HEART RATE: 85 BPM | DIASTOLIC BLOOD PRESSURE: 66 MMHG | WEIGHT: 206 LBS | BODY MASS INDEX: 27.94 KG/M2 | OXYGEN SATURATION: 94 % | SYSTOLIC BLOOD PRESSURE: 115 MMHG

## 2024-06-28 DIAGNOSIS — Z94.4 LIVER REPLACED BY TRANSPLANT (H): Primary | ICD-10-CM

## 2024-06-28 PROCEDURE — 99214 OFFICE O/P EST MOD 30 MIN: CPT | Performed by: INTERNAL MEDICINE

## 2024-06-28 PROCEDURE — G0463 HOSPITAL OUTPT CLINIC VISIT: HCPCS | Performed by: INTERNAL MEDICINE

## 2024-06-28 NOTE — LETTER
6/28/2024      Blanco Osborne  [address]   [address] [place] [id number]      Dear Colleague,    Thank you for referring your patient, Blanco Osborne, to the [facility] [place]. Please see a copy of my visit note below.    Solid Organ Transplant Hepatology Follow-Up Visit:     HISTORY OF PRESENT ILLNESS:   I had the pleasure of seeing Blanco Osborne for followup in the Liver Clinic at the [facility] on June 28, 2024. Mr. Osborne returns for follow-up now almost 8 years status post liver transplantation for metabolic dysfunction associated steatotic liver disease.     Since he was last seen, he has now been listed for kidney transplantation.  He continues on dialysis but is tolerating it poorly     He does report feeling fairly well.  He denies any abdominal pain.  He does have some itching which is in part related to dry skin and also some to dialysis.  He has improving fatigue.  He denies any increased abdominal girth and has not required a paracentesis since October.  He has no lower extremity edema.  He has not had any gastrointestinal bleeding.     He denies any fevers or chills, cough or shortness of breath.  He denies any nausea or vomiting and is having about 2 bowel months per day.  His appetite is good and his weight has remained stable.    Medications:   Current Outpatient Medications   Medication Sig Dispense Refill     acetaminophen (TYLENOL) 325 MG tablet Take 2 tablets (650 mg) by mouth every 8 hours as needed for other (For optimal non-opioid multimodal pain management to improve pain control.)       aspirin 81 MG EC tablet Take 1 tablet (81 mg) by mouth daily 90 tablet 3     gabapentin (NEURONTIN) 100 MG capsule Take 3 capsules (300 mg) by mouth at bedtime 90 capsule 3     hydrOXYzine HCl (ATARAX) 25 MG tablet Take 1 tablet (25 mg) by mouth 3 times daily as needed for itching 90 tablet 1     lacosamide (VIMPAT) 50 MG TABS  tablet Take 1 tablet (50 mg) by mouth 2 times daily 186 tablet 1     melatonin 3 MG tablet Take 1 tablet (3 mg) by mouth At Bedtime (Patient taking differently: Take 3 mg by mouth nightly as needed) 90 tablet 1     midodrine (PROAMATINE) 10 MG tablet TAKE 1 TABLET 3 X DAILY. ON DIALYSIS DAYS(M, W, F) TAKE 2 EXTRA TABLETS 1 HOUR BEFORE, 1 TABLET WHEN YOU ARRIVE, AND 1 TABLET DURING DIALYSIS (Patient taking differently: Take 10 mg by mouth 3 times daily as needed) 450 tablet 1     multivitamin RENAL (TRIPHROCAPS) 1 capsule capsule Take 1 capsule by mouth daily 90 capsule 3     oxyCODONE (ROXICODONE) 5 MG tablet Take 0.5-1 tablets (2.5-5 mg) by mouth every 6 hours as needed for severe pain 15 tablet 0     rOPINIRole (REQUIP) 0.5 MG tablet Take 0.5 mg by mouth 2 times daily as needed       sevelamer carbonate (RENVELA) 800 MG tablet Take 1 tablet (800 mg) by mouth 4 times daily With meals and snacks 120 tablet 0     tacrolimus (GENERIC EQUIVALENT) 1 MG capsule Take 1 capsule (1 mg) by mouth every 12 hours 180 capsule 3     traZODone (DESYREL) 50 MG tablet Take 1 tablet (50 mg) by mouth at bedtime 90 tablet 1     warfarin ANTICOAGULANT (COUMADIN) 2 MG tablet Take 2 to 3 tablets by mouth every day OR as directed by Anticoagulation Clinic 252 tablet 1     No current facility-administered medications for this visit.      Vitals:   There were no vitals taken for this visit.    Physical Exam:   In general he looks quite well. HEENT exam shows no scleral icterus or temporal muscle wasting. Chest is clear. Abdominal exam shows no increase in girth. No masses or tenderness to palpation are present. Liver is 10 cm in span without left lobe enlargement. No spleen tip is palpable. Extremity exam shows no edema. Skin exam shows no stigmata of chronic liver disease. Neurologic exam shows no asterixis.     Labs:   Lab Results   Component Value Date     04/28/2024    POTASSIUM 3.9 04/28/2024    CHLORIDE 99 04/28/2024    ANIONGAP  19 (H) 04/28/2024    CO2 22 04/28/2024    BUN 65.1 (H) 04/28/2024    CR 8.05 (H) 04/28/2024    GFRESTIMATED 7 (L) 04/28/2024    KELLY 8.7 04/28/2024      Lab Results   Component Value Date    WBC 4.8 05/09/2024    HGB 8.6 (L) 05/09/2024    HCT 26.6 (L) 05/09/2024     05/09/2024    MCH 32.2 05/09/2024    MCHC 32.3 05/09/2024    RDW 15.1 (H) 05/09/2024    PLT 91 (L) 05/09/2024     Lab Results   Component Value Date    ALBUMIN 4.1 01/11/2024    ALKPHOS 282 (H) 01/11/2024    AST 28 01/11/2024     Lab Results   Component Value Date    INR 2.6 (H) 05/31/2024       Recent Labs   Lab Test 01/11/24  1353 01/06/24  0915 01/02/24  2327 01/01/24  0813 12/18/23  1436 11/01/23  0634 10/31/23  0655 10/30/23  0459 10/29/23  0745 10/28/23  0726   ALKPHOS 282* 209* 179* 178* 210* 222* 235* 241* 228* 208*   ALT <5 <5 12 10 13 <5 <5 <5 <5 6   AST 28 23 22 17 24 15 16 15 18 16        Imaging:   No images are attached to the encounter.     Assessment/Plan:   IMPRESSION:   My impression is that Mr. Osborne is almost 8 years status post liver transplantation for metabolic dysfunction associated steatotic liver disease.  His liver function is excellent at this point in time.  He does have a slightly low platelet count but I see nothing on his CT scan in October that suggest the presence of cirrhosis.      The good news is that he listed for kidney transplant.  I encouraged him to look for a live donor     He is otherwise up-to-date on other vaccinations and cancer screening.       Otherwise my plan will be to see him back in the clinic in 6 months.     I did spend a total of 30 minutes (on the date of the encounter), including 20 minutes of face-to-face clinic time including counseling. The rest of the time was spent in documentation and review of records.    Thank you very much for allowing me to participate in the care of this patient.  If you have any questions regarding my recommendations, please do not hesitate to contact me.          Juan Simms MD      Professor of Medicine  [employer]      Executive Medical Director, Solid Organ Transplant Program  [facility]

## 2024-06-28 NOTE — PROGRESS NOTES
Solid Organ Transplant Hepatology Follow-Up Visit:     HISTORY OF PRESENT ILLNESS:   I had the pleasure of seeing Blanco Osborne for followup in the Liver Clinic at the [facility] on June 28, 2024. Mr. Osborne returns for follow-up now almost 8 years status post liver transplantation for metabolic dysfunction associated steatotic liver disease.     Since he was last seen, he has now been listed for kidney transplantation.  He continues on dialysis but is tolerating it poorly     He does report feeling fairly well.  He denies any abdominal pain.  He does have some itching which is in part related to dry skin and also some to dialysis.  He has improving fatigue.  He denies any increased abdominal girth and has not required a paracentesis since October.  He has no lower extremity edema.  He has not had any gastrointestinal bleeding.     He denies any fevers or chills, cough or shortness of breath.  He denies any nausea or vomiting and is having about 2 bowel months per day.  His appetite is good and his weight has remained stable.    Medications:   Current Outpatient Medications   Medication Sig Dispense Refill    acetaminophen (TYLENOL) 325 MG tablet Take 2 tablets (650 mg) by mouth every 8 hours as needed for other (For optimal non-opioid multimodal pain management to improve pain control.)      aspirin 81 MG EC tablet Take 1 tablet (81 mg) by mouth daily 90 tablet 3    gabapentin (NEURONTIN) 100 MG capsule Take 3 capsules (300 mg) by mouth at bedtime 90 capsule 3    hydrOXYzine HCl (ATARAX) 25 MG tablet Take 1 tablet (25 mg) by mouth 3 times daily as needed for itching 90 tablet 1    lacosamide (VIMPAT) 50 MG TABS tablet Take 1 tablet (50 mg) by mouth 2 times daily 186 tablet 1    melatonin 3 MG tablet Take 1 tablet (3 mg) by mouth At Bedtime (Patient taking differently: Take 3 mg by mouth nightly as needed) 90 tablet 1    midodrine (PROAMATINE) 10 MG tablet TAKE 1 TABLET 3 X DAILY. ON  DIALYSIS DAYS(M, W, F) TAKE 2 EXTRA TABLETS 1 HOUR BEFORE, 1 TABLET WHEN YOU ARRIVE, AND 1 TABLET DURING DIALYSIS (Patient taking differently: Take 10 mg by mouth 3 times daily as needed) 450 tablet 1    multivitamin RENAL (TRIPHROCAPS) 1 capsule capsule Take 1 capsule by mouth daily 90 capsule 3    oxyCODONE (ROXICODONE) 5 MG tablet Take 0.5-1 tablets (2.5-5 mg) by mouth every 6 hours as needed for severe pain 15 tablet 0    rOPINIRole (REQUIP) 0.5 MG tablet Take 0.5 mg by mouth 2 times daily as needed      sevelamer carbonate (RENVELA) 800 MG tablet Take 1 tablet (800 mg) by mouth 4 times daily With meals and snacks 120 tablet 0    tacrolimus (GENERIC EQUIVALENT) 1 MG capsule Take 1 capsule (1 mg) by mouth every 12 hours 180 capsule 3    traZODone (DESYREL) 50 MG tablet Take 1 tablet (50 mg) by mouth at bedtime 90 tablet 1    warfarin ANTICOAGULANT (COUMADIN) 2 MG tablet Take 2 to 3 tablets by mouth every day OR as directed by Anticoagulation Clinic 252 tablet 1     No current facility-administered medications for this visit.      Vitals:   There were no vitals taken for this visit.    Physical Exam:   In general he looks quite well. HEENT exam shows no scleral icterus or temporal muscle wasting. Chest is clear. Abdominal exam shows no increase in girth. No masses or tenderness to palpation are present. Liver is 10 cm in span without left lobe enlargement. No spleen tip is palpable. Extremity exam shows no edema. Skin exam shows no stigmata of chronic liver disease. Neurologic exam shows no asterixis.     Labs:   Lab Results   Component Value Date     04/28/2024    POTASSIUM 3.9 04/28/2024    CHLORIDE 99 04/28/2024    ANIONGAP 19 (H) 04/28/2024    CO2 22 04/28/2024    BUN 65.1 (H) 04/28/2024    CR 8.05 (H) 04/28/2024    GFRESTIMATED 7 (L) 04/28/2024    KELLY 8.7 04/28/2024      Lab Results   Component Value Date    WBC 4.8 05/09/2024    HGB 8.6 (L) 05/09/2024    HCT 26.6 (L) 05/09/2024     05/09/2024     MCH 32.2 05/09/2024    MCHC 32.3 05/09/2024    RDW 15.1 (H) 05/09/2024    PLT 91 (L) 05/09/2024     Lab Results   Component Value Date    ALBUMIN 4.1 01/11/2024    ALKPHOS 282 (H) 01/11/2024    AST 28 01/11/2024     Lab Results   Component Value Date    INR 2.6 (H) 05/31/2024       Recent Labs   Lab Test 01/11/24  1353 01/06/24  0915 01/02/24  2327 01/01/24  0813 12/18/23  1436 11/01/23  0634 10/31/23  0655 10/30/23  0459 10/29/23  0745 10/28/23  0726   ALKPHOS 282* 209* 179* 178* 210* 222* 235* 241* 228* 208*   ALT <5 <5 12 10 13 <5 <5 <5 <5 6   AST 28 23 22 17 24 15 16 15 18 16        Imaging:   No images are attached to the encounter.     Assessment/Plan:   IMPRESSION:   My impression is that Mr. Osborne is almost 8 years status post liver transplantation for metabolic dysfunction associated steatotic liver disease.  His liver function is excellent at this point in time.  He does have a slightly low platelet count but I see nothing on his CT scan in October that suggest the presence of cirrhosis.      The good news is that he listed for kidney transplant.  I encouraged him to look for a live donor     He is otherwise up-to-date on other vaccinations and cancer screening.       Otherwise my plan will be to see him back in the clinic in 6 months.     I did spend a total of 30 minutes (on the date of the encounter), including 20 minutes of face-to-face clinic time including counseling. The rest of the time was spent in documentation and review of records.    Thank you very much for allowing me to participate in the care of this patient.  If you have any questions regarding my recommendations, please do not hesitate to contact me.         Juan Simms MD      Professor of Medicine  [employer]      Executive Medical Director, Solid Organ Transplant Program  [facility]

## 2024-07-02 ENCOUNTER — HOSPITAL ENCOUNTER (OUTPATIENT)
Dept: CARDIAC REHAB | Facility: CLINIC | Age: 59
Discharge: HOME | End: 2024-07-02
Attending: STUDENT IN AN ORGANIZED HEALTH CARE EDUCATION/TRAINING PROGRAM
Payer: COMMERCIAL

## 2024-07-02 ENCOUNTER — TELEPHONE (OUTPATIENT)
Dept: ANTICOAGULATION | Facility: CLINIC | Age: 59
End: 2024-07-02
Payer: COMMERCIAL

## 2024-07-02 ENCOUNTER — MYC MEDICAL ADVICE (OUTPATIENT)
Dept: ANTICOAGULATION | Facility: CLINIC | Age: 59
End: 2024-07-02
Payer: COMMERCIAL

## 2024-07-02 NOTE — TELEPHONE ENCOUNTER
ANTICOAGULATION     Blanco TARYN Osborne is overdue for an INR check.     Writer identifies self and phone was disconnected.     Frances Ward RN

## 2024-07-02 NOTE — LETTER
July 2, 2024      TO: Blanco Osborne  [address]   [address]  [place] [facility] [address]         Dear Blacno,    You are currently under the care of [facility] for your warfarin (Coumadin , Jantoven ) therapy.  We are contacting you because our records show you were due for an INR on 6/2/24.    There are potentially serious risks when taking warfarin without careful monitoring and we want to make sure you are safely managed.  Routine lab monitoring is required for warfarin refills.     Please call [phone] as soon as possible to schedule a lab appointment. If it is difficult for you to get to lab, please call us to discuss options.  If there has been a change in your care or other concerns, please let us know so we can help and/or update our records.         Sincerely,       [facility]

## 2024-07-08 DIAGNOSIS — Z94.4 LIVER REPLACED BY TRANSPLANT (H): Primary | ICD-10-CM

## 2024-07-08 DIAGNOSIS — Z94.4 LIVER TRANSPLANTED (H): ICD-10-CM

## 2024-07-08 NOTE — LETTER
OUTPATIENT LABORATORY TEST ORDER     Patient Name: Blanco Osborne   YOB: 1965     [facility] MR# [if applicable]: [id number]   Date & Time: July 8, 2024  3:49 PM  Expiration Date: 1 year after date issued       Diagnosis: Liver Transplant (ICD-10 Z94.4)   Aftercare following organ transplant (ICD-10 Z48.288)   Long term use of medications (ICD-10 Z79.899)      We ask your assistance in obtaining the following laboratory tests, which are part of our routine surveillance program for Solid Organ Transplant patients.     Please fax each result to [phone], same day as resulted/available    Critical lab results page [phone]    Every 2-3 months  CBC with Platelets   Basic Metabolic Panel   Hepatic panel   Tacrolimus drug level - 12 hour trough, please document time of last dose     Labs annually  Fasting Lipid Panel  Urine protein/creatinine ratio  UA with reflex to micro  Hepatitis B DNA PCR If HBsAG+ at time of transplant    If you have any questions, please call [facility]- [phone] or [phone], Fax- [phone].    .

## 2024-07-09 ENCOUNTER — HOSPITAL ENCOUNTER (OUTPATIENT)
Dept: CARDIAC REHAB | Facility: CLINIC | Age: 59
Discharge: HOME | End: 2024-07-09
Attending: STUDENT IN AN ORGANIZED HEALTH CARE EDUCATION/TRAINING PROGRAM
Payer: COMMERCIAL

## 2024-07-09 PROCEDURE — 93798 PHYS/QHP OP CAR RHAB W/ECG: CPT | Performed by: REHABILITATION PRACTITIONER

## 2024-07-11 ENCOUNTER — HOSPITAL ENCOUNTER (OUTPATIENT)
Dept: CARDIAC REHAB | Facility: CLINIC | Age: 59
Discharge: HOME | End: 2024-07-11
Attending: STUDENT IN AN ORGANIZED HEALTH CARE EDUCATION/TRAINING PROGRAM
Payer: COMMERCIAL

## 2024-07-11 PROCEDURE — 93798 PHYS/QHP OP CAR RHAB W/ECG: CPT | Performed by: OCCUPATIONAL THERAPIST

## 2024-07-16 ENCOUNTER — HOSPITAL ENCOUNTER (OUTPATIENT)
Dept: CARDIAC REHAB | Facility: CLINIC | Age: 59
Discharge: HOME | End: 2024-07-16
Attending: STUDENT IN AN ORGANIZED HEALTH CARE EDUCATION/TRAINING PROGRAM
Payer: COMMERCIAL

## 2024-07-16 PROCEDURE — 93798 PHYS/QHP OP CAR RHAB W/ECG: CPT | Performed by: REHABILITATION PRACTITIONER

## 2024-07-18 ENCOUNTER — HOSPITAL ENCOUNTER (OUTPATIENT)
Dept: CARDIAC REHAB | Facility: CLINIC | Age: 59
Discharge: HOME | End: 2024-07-18
Attending: STUDENT IN AN ORGANIZED HEALTH CARE EDUCATION/TRAINING PROGRAM
Payer: COMMERCIAL

## 2024-07-18 PROCEDURE — 93798 PHYS/QHP OP CAR RHAB W/ECG: CPT | Performed by: OCCUPATIONAL THERAPIST

## 2024-07-30 ENCOUNTER — HOSPITAL ENCOUNTER (OUTPATIENT)
Dept: CARDIAC REHAB | Facility: CLINIC | Age: 59
Discharge: HOME | End: 2024-07-30
Attending: STUDENT IN AN ORGANIZED HEALTH CARE EDUCATION/TRAINING PROGRAM
Payer: COMMERCIAL

## 2024-07-30 ENCOUNTER — TELEPHONE (OUTPATIENT)
Dept: TRANSPLANT | Facility: CLINIC | Age: 59
End: 2024-07-30
Payer: COMMERCIAL

## 2024-07-30 PROCEDURE — 93798 PHYS/QHP OP CAR RHAB W/ECG: CPT | Performed by: REHABILITATION PRACTITIONER

## 2024-07-30 NOTE — TELEPHONE ENCOUNTER
Received voicemail from pts wife. Returned call. They are looking for the coumadin clinics phone number, will track down and email to pt. Wondering how much time before offer comes, reviewed average wait time for O blood type recipient is 5 years, pt has 1 year and 6 months of wait time currently. Wife may be interested in being a living donor, will email donor site with coumadin clinic number. Reports derm visit completed, everything was okay. Will request records. Pt verbalized understanding of information and has no further questions. Encouraged to reach out if questions arise.

## 2024-08-01 ENCOUNTER — TELEPHONE (OUTPATIENT)
Dept: ANTICOAGULATION | Facility: CLINIC | Age: 59
End: 2024-08-01
Payer: COMMERCIAL

## 2024-08-01 ENCOUNTER — HOSPITAL ENCOUNTER (OUTPATIENT)
Dept: CARDIAC REHAB | Facility: CLINIC | Age: 59
Discharge: HOME | End: 2024-08-01
Attending: STUDENT IN AN ORGANIZED HEALTH CARE EDUCATION/TRAINING PROGRAM
Payer: COMMERCIAL

## 2024-08-01 PROCEDURE — 93798 PHYS/QHP OP CAR RHAB W/ECG: CPT | Performed by: REHABILITATION PRACTITIONER

## 2024-08-01 NOTE — TELEPHONE ENCOUNTER
ANTICOAGULATION     Blanco Osborne is overdue for an INR check.     Spoke with patient and he reports he hasn't been taking warfarin.  The last time he requested a refill he was given a 2 week supply and was instructed to have an INR done.  Writer educates patient the importance of regular INR checks when on warfarin.  Patient plans to talk with PCP about options.     Frances Ward RN

## 2024-08-07 ENCOUNTER — LAB (OUTPATIENT)
Dept: LAB | Facility: CLINIC | Age: 59
End: 2024-08-07
Payer: COMMERCIAL

## 2024-08-07 DIAGNOSIS — Z76.82 AWAITING ORGAN TRANSPLANT: ICD-10-CM

## 2024-08-07 PROCEDURE — 86832 HLA CLASS I HIGH DEFIN QUAL: CPT

## 2024-08-07 PROCEDURE — 86833 HLA CLASS II HIGH DEFIN QUAL: CPT

## 2024-08-13 ENCOUNTER — DOCUMENTATION ONLY (OUTPATIENT)
Dept: TRANSPLANT | Facility: CLINIC | Age: 59
End: 2024-08-13
Payer: COMMERCIAL

## 2024-08-13 NOTE — PROGRESS NOTES
Annual chart review completed.      Patient is up to date with appointments.    But overdue to labs.  Call to Blanco.  Reminded him that he should be having post transplant labs w/ a 12 hour trough tac level every 3-4 mos.    He was under the impression that [facility] was sending them to us. They are not.  I reminded him of the need to a 12 hour trough level for a correct tac level.     He's going to see his PCP in a few weeks, needs orders in the [facility] system.

## 2024-08-16 LAB
PROTOCOL CUTOFF: NORMAL
SA 1  COMMENTS: NORMAL
SA 1 CELL: NORMAL
SA 1 TEST METHOD: NORMAL
SA 2 CELL: NORMAL
SA 2 COMMENTS: NORMAL
SA 2 TEST METHOD: NORMAL
SA1 HI RISK ABY: NORMAL
SA1 MOD RISK ABY: NORMAL
SA2 HI RISK ABY: NORMAL
SA2 MOD RISK ABY: NORMAL
UNACCEPTABLE ANTIGENS: NORMAL
UNOS CPRA: 62

## 2024-08-20 DIAGNOSIS — L29.9 ITCHING: ICD-10-CM

## 2024-08-20 DIAGNOSIS — K74.60 CIRRHOSIS OF LIVER WITH ASCITES, UNSPECIFIED HEPATIC CIRRHOSIS TYPE (H): ICD-10-CM

## 2024-08-20 DIAGNOSIS — G25.81 RESTLESS LEG SYNDROME: ICD-10-CM

## 2024-08-20 DIAGNOSIS — Z94.4 LIVER TRANSPLANTED (H): ICD-10-CM

## 2024-08-20 DIAGNOSIS — G47.00 INSOMNIA, UNSPECIFIED TYPE: ICD-10-CM

## 2024-08-20 DIAGNOSIS — R18.8 CIRRHOSIS OF LIVER WITH ASCITES, UNSPECIFIED HEPATIC CIRRHOSIS TYPE (H): ICD-10-CM

## 2024-08-20 RX ORDER — HYDROXYZINE HYDROCHLORIDE 25 MG/1
TABLET, FILM COATED ORAL
Qty: 90 TABLET | Refills: 1 | OUTPATIENT
Start: 2024-08-20

## 2024-08-20 RX ORDER — GABAPENTIN 100 MG/1
CAPSULE ORAL
Qty: 90 CAPSULE | Refills: 3 | Status: SHIPPED | OUTPATIENT
Start: 2024-08-20

## 2024-08-29 NOTE — Clinical Note
Catheter removed over wire. FAMILY HISTORY:  Father  Still living? Unknown  Family history of anemia, Age at diagnosis: Age Unknown    Sibling  Still living? Unknown  Family history of anemia, Age at diagnosis: Age Unknown    Child  Still living? Unknown  Family history of anemia, Age at diagnosis: Age Unknown

## 2024-08-30 NOTE — PLAN OF CARE
DATE & TIME: 04/08/23  Cognitive Concerns/ Orientation: Disoriented to time/situation, with increased confusion at times; can be impulsive BEHAVIOR & AGGRESSION TOOL COLOR: Green        ABNL VS/O2: VSS, room air  MOBILITY: Assist-1 gait belt/walker; 10 lb weight bear limit on L arm d/t new fistula; very tired today- refused walks   PAIN MANAGMENT: Denies  DIET: Regular diet, mildly thickened liquids (level 2); takes pills without difficulty; on calorie counts to try and get gtube out  BOWEL/BLADDER: BM this shift; pt on hemodialysis  ABNL LAB/BG: On dialysis  DRAIN/DEVICES: R chest CVC, double lumen for hemodialysis; R PIV saline locked; PEG tube clamped; L fistula placed 3/21/23 with strong bruit/thrill present--open to air, no redness/swelling  SKIN: Scattered bruising; L forearm skin tear covered; L fistula healing; dry scaly skin  PROCEDURES: Dialysis MWF  D/C DATE: Pending placement to TCU/LTC   OTHER IMPORTANT INFO: Nephrology following; family at bedside.                                Potential interactions with Paxlovid--please hold these medications while taking:    Valium  Crestor  Tramadol  Flexeril  Hydrocodone  Cozaar (Losartan)--unless approved by Dr Sinha    EAR:    Take antibiotic as prescribed. Take antibiotic at breakfast and at dinner and eat a yogurt or take a probiotic at lunch to help keep your good bacteria in balance.         Probiotics:    Antibiotic use can cause upset stomach, nausea, vomiting, diarrhea, and yeast infection for women.     It is recommended to take a probiotic daily (Fortify for Women, Florastor, Align, Floragen, Culturelle, etc) or eat probiotic containing foods while on an antibiotic and for at least 10 days after completing the medication.  This will help keep the good bacteria in you body in good balance and can help to prevent stomach upset and yeast infections.    Good choices of probiotic containing food include yogurt, kefir, and kombucha.       Be sure to take the probiotic or eat probiotic food 2 hours apart from the antibiotic.    I recommend the 4 H's for inflammation:    1. Heat (warm mist from the shower or warm liquids such as tea)  2. Honey (mixed in your tea or by the spoonful [if you are not diabetic; over the age of 1 year]--take a spoonful 3 times a day and don't eat or drink anything for 15-20 minutes)  3. Humidity--cool mist in the bedroom at night  4. Hydration --at least 8 -10 glasses a day      Keep ear clean and dry. May take OTC acetaminophen or ibuprofen as needed for pain as directed.     Be sure to wash hands often.     If symptoms persist after 48-72 hours on antibiotics, patient is to follow up with PCP or return to clinic.     Go to ER if you develop any facial or neck swelling, pain or swelling behind ear, high fever, vertigo, severe pain or facial paralysis/numbness    Verbalized Understanding: Patient verbalized understanding and agrees with plan.     Middle Ear Infection (Adult)  You have an infection of the middle ear,  the space behind the eardrum. This is also called acute otitis media (AOM). Sometimes it is caused by the common cold. This is because congestion can block the internal passage (eustachian tube) that drains fluid from the middle ear. When the middle ear fills with fluid, bacteria can grow there and cause an infection. Oral antibiotics are used to treat this illness, not ear drops. Symptoms usually start to improve within 1 to 2 days of treatment.    Home care  The following are general care guidelines:  Finish all of the antibiotic medicine given, even though you may feel better after the first few days.  You may use over-the-counter medicine, such as acetaminophen or ibuprofen, to control pain and fever, unless something else was prescribed. If you have chronic liver or kidney disease or have ever had a stomach ulcer or gastrointestinal bleeding, talk with your healthcare provider before using these medicines. Do not give aspirin to anyone under 18 years of age who has a fever. It may cause severe illness or death.  Follow-up care  Follow up with your healthcare provider, or as advised, in 2 weeks if all symptoms have not gotten better, or if hearing doesn't go back to normal within 1 month.  When to seek medical advice  Call your healthcare provider right away if any of these occur:  Ear pain gets worse or does not improve after 3 days of treatment  Unusual drowsiness or confusion  Neck pain, stiff neck, or headache  Fluid or blood draining from the ear canal  Fever of 100.4°F (38°C) or as advised   Seizure  Date Last Reviewed: 6/1/2016  © 4507-3968 [employer] [place]. [address] [place] [address], [place] [address]. All rights reserved. This information is not intended as a substitute for professional medical care. Always follow your healthcare professional's instructions.    The CDC now recommends the following for respiratory infections and to prevent spreading, this includes Covid-19, flu, and RSV.        The  updated guidance from the CDC is to “stay home and away from others (including people you live with who are not sick) if you have respiratory virus symptoms that aren't better explained by another cause.” You can resume normal activities once your symptoms are improving and you’ve been fever-free--without the aid of fever-reducing medications--for at least 24 hours.            For the five days after you resume your normal activities, you should take extra precautions, like wearing a well-fitting mask and maintaining distance from others, gathering outdoors or in well-ventilated areas, cleaning hands and high-touch surfaces often, and testing when possible before gathering with others. If symptoms or fever return, you should start back at square one: staying home and away from others until you’ve been improving and fever-free for at least 24 hours.    https://www.cdc.gov/respiratory-viruses/guidance/?CDC_AAref_Val=https://www.cdc.gov/respiratory-viruses/guidance/respiratory-virus-guidance.html  https://www.cdc.gov/respiratory-viruses/prevention/precautions-when-sick.html  https://[email or web address]/2024/-explains-the-cdcs-new-covid-isolation-guidance  3/5/2024

## 2024-09-13 ENCOUNTER — TELEPHONE (OUTPATIENT)
Dept: ANTICOAGULATION | Facility: CLINIC | Age: 59
End: 2024-09-13
Payer: COMMERCIAL

## 2024-09-13 NOTE — TELEPHONE ENCOUNTER
ANTICOAGULATION MANAGEMENT PROGRAM    Dr. Abreu,     Our records indicate that Blanco Osborne remains past due to check an INR. Blanco Osborne was contacted multiple times over at least the last 8 weeks to attempt to arrange a follow up appointment.    Blanco Osborne last had an an INR checked on 5/31/24 and was due for follow up on 6/7/24     Called patient,Left message for patient to call and schedule lab appointment as soon as possible. If returning call, please schedule.      At this time Blanco Osborne will be moved to noncompliant status within the program until their referral expires on 5/14/25. While in noncompliant status the patient would continue to be contacted every 6 weeks by the anticoagulation program to attempt to schedule patient. You will be notified of each contact attempt to make you aware of patient's ongoing noncompliance.        Thank you,     [facility] Anticoagulation Management Program

## 2024-09-26 ENCOUNTER — TELEPHONE (OUTPATIENT)
Dept: ANTICOAGULATION | Facility: CLINIC | Age: 59
End: 2024-09-26

## 2024-09-26 ENCOUNTER — OFFICE VISIT (OUTPATIENT)
Dept: FAMILY MEDICINE | Facility: CLINIC | Age: 59
End: 2024-09-26
Payer: COMMERCIAL

## 2024-09-26 VITALS
HEIGHT: 72 IN | DIASTOLIC BLOOD PRESSURE: 82 MMHG | WEIGHT: 210.1 LBS | HEART RATE: 89 BPM | TEMPERATURE: 97.8 F | SYSTOLIC BLOOD PRESSURE: 125 MMHG | RESPIRATION RATE: 18 BRPM | OXYGEN SATURATION: 97 % | BODY MASS INDEX: 28.46 KG/M2

## 2024-09-26 DIAGNOSIS — Z79.01 ANTICOAGULATED: ICD-10-CM

## 2024-09-26 DIAGNOSIS — K40.90 NON-RECURRENT INGUINAL HERNIA WITHOUT OBSTRUCTION OR GANGRENE, UNSPECIFIED LATERALITY: Primary | ICD-10-CM

## 2024-09-26 DIAGNOSIS — R18.8 CIRRHOSIS OF LIVER WITH ASCITES, UNSPECIFIED HEPATIC CIRRHOSIS TYPE (H): ICD-10-CM

## 2024-09-26 DIAGNOSIS — Z76.82 ORGAN TRANSPLANT CANDIDATE: ICD-10-CM

## 2024-09-26 DIAGNOSIS — K21.00 GASTROESOPHAGEAL REFLUX DISEASE WITH ESOPHAGITIS WITHOUT HEMORRHAGE: ICD-10-CM

## 2024-09-26 DIAGNOSIS — Z23 NEED FOR PROPHYLACTIC VACCINATION AND INOCULATION AGAINST INFLUENZA: ICD-10-CM

## 2024-09-26 DIAGNOSIS — Z23 HIGH PRIORITY FOR 2019-NCOV VACCINE: ICD-10-CM

## 2024-09-26 DIAGNOSIS — J90 PLEURAL EFFUSION: ICD-10-CM

## 2024-09-26 DIAGNOSIS — I48.91 ATRIAL FIBRILLATION WITH RAPID VENTRICULAR RESPONSE (H): ICD-10-CM

## 2024-09-26 DIAGNOSIS — T82.898A OTHER SPECIFIED COMPLICATION OF VASCULAR PROSTHETIC DEVICES, IMPLANTS AND GRAFTS, INITIAL ENCOUNTER (H): ICD-10-CM

## 2024-09-26 DIAGNOSIS — K74.60 CIRRHOSIS OF LIVER WITH ASCITES, UNSPECIFIED HEPATIC CIRRHOSIS TYPE (H): ICD-10-CM

## 2024-09-26 DIAGNOSIS — I63.10 CEREBROVASCULAR ACCIDENT (CVA) DUE TO EMBOLISM OF PRECEREBRAL ARTERY (H): ICD-10-CM

## 2024-09-26 DIAGNOSIS — Z76.82 ORGAN TRANSPLANT CANDIDATE: Primary | ICD-10-CM

## 2024-09-26 PROBLEM — A40.3: Status: RESOLVED | Noted: 2022-11-19 | Resolved: 2024-09-26

## 2024-09-26 PROBLEM — J96.21 ACUTE ON CHRONIC RESPIRATORY FAILURE WITH HYPOXIA (H): Status: RESOLVED | Noted: 2022-12-29 | Resolved: 2024-09-26

## 2024-09-26 PROBLEM — I26.94 MULTIPLE SUBSEGMENTAL PULMONARY EMBOLI WITHOUT ACUTE COR PULMONALE (H): Status: RESOLVED | Noted: 2023-10-05 | Resolved: 2024-09-26

## 2024-09-26 PROBLEM — E72.20 HYPERAMMONEMIA (H): Status: RESOLVED | Noted: 2022-11-29 | Resolved: 2024-09-26

## 2024-09-26 PROBLEM — I46.9 PEA (PULSELESS ELECTRICAL ACTIVITY) (H): Status: RESOLVED | Noted: 2022-12-23 | Resolved: 2024-09-26

## 2024-09-26 PROBLEM — R65.20: Status: RESOLVED | Noted: 2022-11-19 | Resolved: 2024-09-26

## 2024-09-26 PROBLEM — J96.01: Status: RESOLVED | Noted: 2022-11-19 | Resolved: 2024-09-26

## 2024-09-26 PROCEDURE — G0008 ADMIN INFLUENZA VIRUS VAC: HCPCS | Performed by: PHYSICIAN ASSISTANT

## 2024-09-26 PROCEDURE — 90480 ADMN SARSCOV2 VAC 1/ONLY CMP: CPT | Performed by: PHYSICIAN ASSISTANT

## 2024-09-26 PROCEDURE — 91320 SARSCV2 VAC 30MCG TRS-SUC IM: CPT | Performed by: PHYSICIAN ASSISTANT

## 2024-09-26 PROCEDURE — 90673 RIV3 VACCINE NO PRESERV IM: CPT | Performed by: PHYSICIAN ASSISTANT

## 2024-09-26 PROCEDURE — 99214 OFFICE O/P EST MOD 30 MIN: CPT | Mod: 25 | Performed by: PHYSICIAN ASSISTANT

## 2024-09-26 RX ORDER — OMEPRAZOLE 40 MG/1
40 CAPSULE, DELAYED RELEASE ORAL DAILY
Qty: 90 CAPSULE | Refills: 1 | Status: SHIPPED | OUTPATIENT
Start: 2024-09-26

## 2024-09-26 RX ORDER — OXYCODONE HYDROCHLORIDE 5 MG/1
2.5-5 TABLET ORAL EVERY 6 HOURS PRN
Qty: 15 TABLET | Refills: 0 | Status: SHIPPED | OUTPATIENT
Start: 2024-09-26

## 2024-09-26 RX ORDER — WARFARIN SODIUM 2 MG/1
TABLET ORAL
Qty: 252 TABLET | Refills: 1 | Status: SHIPPED | OUTPATIENT
Start: 2024-09-26

## 2024-09-26 ASSESSMENT — PAIN SCALES - GENERAL: PAINLEVEL: MILD PAIN (3)

## 2024-09-26 NOTE — TELEPHONE ENCOUNTER
"Updated [facility] referral received today, previously managed by [facility] team. Per chart review - Dr. Carter noted today, \"has been off his warfarin for a few months.\" Per chart review, last INR was checked 5/31/24, warfarin refills were tapered d/t noncompliance. Writer explained reasoning to patient and apologized for the inconvenience; however, discussed the importance of checking INR levels going forward. Patient understands to resume previous MD of 4 mg every Sun, Tue, Thu; 6 mg all other days and scheduled INR appt for 10/4/24. Mona Kumar, ANANDN, RN    "

## 2024-09-26 NOTE — PROGRESS NOTES
Assessment & Plan     Other specified complication of vascular prosthetic devices, implants and grafts, initial encounter (H24)  Refilled medication.  Use sparingly.  Safety/side effects reviewed.  - oxyCODONE (ROXICODONE) 5 MG tablet  Dispense: 15 tablet; Refill: 0    Cerebrovascular accident (CVA) due to embolism of precerebral artery (H)  Pleural effusion  Organ transplant candidate  Anticoagulated  Resume warfarin.  Referral placed to INR clinic.  - warfarin ANTICOAGULANT (COUMADIN) 2 MG tablet  Dispense: 252 tablet; Refill: 1  - Anticoagulation Clinic Referral  - INR    Non-recurrent inguinal hernia without obstruction or gangrene, unspecified laterality  Referral placed to general surgery for further evaluation/treatment.  - Adult Gen Surg  Referral    Gastroesophageal reflux disease with esophagitis without hemorrhage  - omeprazole (PRILOSEC) 40 MG DR capsule  Dispense: 90 capsule; Refill: 1    COVID/flu shot today.     30 minutes spent by me on the date of the encounter doing chart review, review of test results, interpretation of tests, patient visit, and documentation     The longitudinal plan of care for the diagnosis(es)/condition(s) as documented were addressed during this visit. Due to the added complexity in care, I will continue to support Blanco in the subsequent management and with ongoing continuity of care.    Subjective   Blanco is a 59 year old, presenting for the following health issues:  Gastrointestinal Problem (Possible hernia , GI issues), LAB REQUEST (INR + Warfarin RX ), and Refill Request    -Awaiting kidney transplant.  Recent labs reviewed. Continues on dialysis.  -Recent follow-up with Dr. Simms.   -Has been off his warfarin for a few months.  Restart.  Resume regular INRs.  -Requesting prescription for omeprazole.  Has been taking over-the-counter and has been helpful for GERD  -Notes development of a few hernias he believes after cardiac rehab.  -Taking gabapentin as well  as Requip for restless leg.  -Requesting refill on prescription for oxycodone which he uses very sparingly for left arm fistula pain/discomfort.        9/26/2024     9:15 AM   Additional Questions   Roomed by Lulu   Accompanied by Not applicable, by themselves     History of Present Illness       CKD: He uses over the counter pain medication, including tylenol, a few times a month.    He eats 2-3 servings of fruits and vegetables daily.He consumes 1 sweetened beverage(s) daily.He exercises with enough effort to increase his heart rate 20 to 29 minutes per day.  He exercises with enough effort to increase his heart rate 4 days per week.   He is taking medications regularly.      Review of Systems  Constitutional, neuro, ENT, endocrine, pulmonary, cardiac, gastrointestinal, genitourinary, musculoskeletal, integument and psychiatric systems are negative, except as otherwise noted.      Objective    /82 (BP Location: Right arm, Patient Position: Sitting, Cuff Size: Adult Regular)   Pulse 89   Temp 97.8  F (36.6  C) (Temporal)   Resp 18   Ht 1.829 m (6')   Wt 95.3 kg (210 lb 1.6 oz)   SpO2 97%   BMI 28.49 kg/m    Body mass index is 28.49 kg/m .  Physical Exam   GENERAL: alert and no distress  EYES: Eyes grossly normal to inspection, PERRL and conjunctivae and sclerae normal  HENT: ear canals and TM's normal, nose and mouth without ulcers or lesions  NECK: no adenopathy, no asymmetry, masses, or scars  RESP: lungs clear to auscultation - no rales, rhonchi or wheezes  CV: regular rate and rhythm, normal S1 S2, no S3 or S4, no murmur, click or rub, no peripheral edema  ABDOMEN: soft, nontender.  Large inguinal hernia present, additional ventral hernia located right side of lower abdomen plus an incisional hernia in his epigastric region.  No signs of incarceration/strangulation.  MS: no gross musculoskeletal defects noted, no edema  SKIN: no suspicious lesions or rashes  NEURO: Normal strength and tone,  mentation intact and speech normal  PSYCH: mentation appears normal, affect normal/bright    The likelihood of other entities in the differential is insufficient to justify any further testing for them at this time. This was explained to the patient. The patient was advised that persistent or worsening symptoms would require further evaluation. Patient advised to call the office and if unable to reach to go to the emergency room if they develop any new or worsening symptoms. Expressed understanding and agreement with above stated plan.         Signed Electronically by: Ki Carter PA-C

## 2024-10-04 ENCOUNTER — LAB (OUTPATIENT)
Dept: LAB | Facility: CLINIC | Age: 59
End: 2024-10-04
Payer: COMMERCIAL

## 2024-10-04 DIAGNOSIS — K76.0 NAFLD (NONALCOHOLIC FATTY LIVER DISEASE): Primary | ICD-10-CM

## 2024-10-07 ENCOUNTER — TELEPHONE (OUTPATIENT)
Dept: ANTICOAGULATION | Facility: CLINIC | Age: 59
End: 2024-10-07
Payer: COMMERCIAL

## 2024-10-07 NOTE — TELEPHONE ENCOUNTER
ANTICOAGULATION     Blanco Osborne is overdue for an INR check.     Left message for patient to call and schedule lab appointment as soon as possible. If returning call, please schedule.     Lou Duong RN  10/7/2024  Anticoagulation Clinic  [facility] for routing messages: vashti MCGREGOR  [facility] patient phone line: [phone]

## 2024-10-07 NOTE — PROGRESS NOTES
[facility]    Blanco Osborne returns for follow-up of his upper arm fistula.    -- 3/21/2023: Left upper arm proximal radial to cephalic fistula creation    -- 8/15/2023: Repair left distal pseudoaneurysm x 2 with outflow revision of the shoulder cephalic vein radial/axillary vein.    -- 1/3/2024: Large hematoma following dialysis.  Surgical repair of the dialysis needle hole with cephalic vein patch outflow stenosis.    -- 4/25/2024 fistula duplex: Widely patent.  Narrowed to 2.1 mm near the previous vein patch.  Removed tunnel cath    -- 6/13/2024 fistula duplex: Overall looks excellent.  Still narrowing in the upper arm to 2.9 mm.  Outflow volume= 213 5 mL/min.  Fistula functioning well and decided on follow-up duplex.  On chronic Coumadin.    On cadaveric liver transplant list @ [facility].  His original living related donor fell through needs looking at another option.  Also thinking of the [facility] system.    PMH: CVA due to the embolism    ROS: Fistula is working well at the [facility] unit.  They tend to use the same access site for both inflow and outflow.  Has had no issues with excessive bleeding or skin issues.    Does have a large left inguinal hernia along with a hernia along his chevron abdominal incision and is seeing Dr. Read for evaluation later today who is one of my general surgical partners who specializes in hernias.    Exam: Alert and appropriate.  Normal affect.  Ambulatory.   Blood pressure 115/77 right arm.  Pulse 79 regular.   Chest= clear   Abdomen= distention due to ascites.  Nontender.   Large left inguinal hernia and 2 cm left upper quadrant chevron hernia-reducible  Excellent pulse within fistula.  Mildly dilated.  Overlying skin is normal.   -Palpable area of stenosis closer to the axilla   Excellent antecubital basilic vein   +3 left radial pulse.    Fistula duplex reveals excellent function without flow volume 2557 mL/min.  Mild dilatation  throughout the mid and distal upper arm fistula with maximum diameter 13.6 mm.  Widely patent anastomosis to the radial artery.  Stable narrowing in the upper arm at 2.5 mm.  Outflow vein beyond this in the axilla averages 9.7 mm.          IMPRESSION:   #1.  Stable upper arm focal stenosis of the fistula which otherwise is working well.  As long as the fistula continues to work well with no high venous pressures or needle hole bleeding no interventions indicated.  Will repeat fistula duplex in approximately 4 months.  He will call if there is any concerns prior to this.    #2.  Left upper abdominal incisional hernia left inguinal hernia to be evaluated by Dr. Read    #3.  End-stage liver disease with failing transplant.  Looking into repeat transplant.    20 minutes with Blanco payne.    Deejay Mcneil MD   This note was created using Dragon voice recognition software which may result in transcription errors.

## 2024-10-10 ENCOUNTER — OFFICE VISIT (OUTPATIENT)
Dept: OTHER | Facility: CLINIC | Age: 59
End: 2024-10-10
Attending: SURGERY
Payer: COMMERCIAL

## 2024-10-10 ENCOUNTER — HOSPITAL ENCOUNTER (OUTPATIENT)
Dept: ULTRASOUND IMAGING | Facility: CLINIC | Age: 59
Discharge: HOME | End: 2024-10-10
Attending: SURGERY
Payer: COMMERCIAL

## 2024-10-10 ENCOUNTER — OFFICE VISIT (OUTPATIENT)
Dept: SURGERY | Facility: CLINIC | Age: 59
End: 2024-10-10
Payer: COMMERCIAL

## 2024-10-10 VITALS
BODY MASS INDEX: 28.44 KG/M2 | DIASTOLIC BLOOD PRESSURE: 70 MMHG | WEIGHT: 210 LBS | HEIGHT: 72 IN | SYSTOLIC BLOOD PRESSURE: 114 MMHG | OXYGEN SATURATION: 95 % | RESPIRATION RATE: 16 BRPM | HEART RATE: 72 BPM

## 2024-10-10 VITALS — HEART RATE: 77 BPM | SYSTOLIC BLOOD PRESSURE: 115 MMHG | DIASTOLIC BLOOD PRESSURE: 77 MMHG

## 2024-10-10 DIAGNOSIS — Z99.2 ESRD (END STAGE RENAL DISEASE) ON DIALYSIS (H): ICD-10-CM

## 2024-10-10 DIAGNOSIS — N18.6 ESRD (END STAGE RENAL DISEASE) ON DIALYSIS (H): ICD-10-CM

## 2024-10-10 DIAGNOSIS — T82.858A STENOSIS OF OTHER VASCULAR PROSTHETIC DEVICES, IMPLANTS AND GRAFTS, INITIAL ENCOUNTER (H): ICD-10-CM

## 2024-10-10 DIAGNOSIS — T82.898A OTHER SPECIFIED COMPLICATION OF VASCULAR PROSTHETIC DEVICES, IMPLANTS AND GRAFTS, INITIAL ENCOUNTER (H): ICD-10-CM

## 2024-10-10 DIAGNOSIS — K40.90 NON-RECURRENT INGUINAL HERNIA WITHOUT OBSTRUCTION OR GANGRENE, UNSPECIFIED LATERALITY: ICD-10-CM

## 2024-10-10 DIAGNOSIS — I77.0 ARTERIOVENOUS FISTULA (H): ICD-10-CM

## 2024-10-10 DIAGNOSIS — I77.0 AVF (ARTERIOVENOUS FISTULA) (H): Primary | ICD-10-CM

## 2024-10-10 PROCEDURE — 99213 OFFICE O/P EST LOW 20 MIN: CPT | Performed by: SURGERY

## 2024-10-10 PROCEDURE — 99244 OFF/OP CNSLTJ NEW/EST MOD 40: CPT | Performed by: SURGERY

## 2024-10-10 PROCEDURE — 93990 DOPPLER FLOW TESTING: CPT

## 2024-10-10 PROCEDURE — G0463 HOSPITAL OUTPT CLINIC VISIT: HCPCS | Performed by: SURGERY

## 2024-10-10 NOTE — LETTER
October 24, 2024          Ki Carter PA-C  [address] [facility] [address]  [place] [address]      RE:   Blanco Osborne 1965      Dear Colleague,    Thank you for referring your patient, Blanco Osborne, to [facility]. Please see a copy of my visit note below.    This patient is a medically as well as surgically complex 59-year-old gentleman with a prior history of liver transplantation who presents for evaluation of new left inguinal hernia.  Patient does have a prior history of previous incarcerated hernia repair in the past.  He has no signs or symptoms of incarceration or strangulation.  No GI or bowel obstruction symptoms.     PMH:   has a past medical history of Acquired immunocompromised state (H) (11/19/2022), Acute renal failure, unspecified acute renal failure type (H) (11/12/2022), Antiplatelet or antithrombotic long-term use, Arrhythmia, Ascites, Aspergillus pneumonia (H) (11/19/2022), Cancer (H) (2023), Cirrhosis of liver with ascites (H) (02/11/2016), Coagulopathy (H), Critical illness myopathy, Dialysis patient (H), Embolic stroke (H) (11/20/2022), Encephalopathy, ESRD (end stage renal disease) on dialysis (H), Gastroesophageal reflux disease, H/O alcohol abuse, History of blood transfusion, Hyperammonemia (H), Hyperlipidemia, Hypertension, 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) (11/19/2022), Insomnia, Lactic acidosis (11/12/2022), Liver replaced by transplant (H) (09/20/2016), NAFLD (nonalcoholic fatty liver disease) (02/11/2016), CHRISTIAN on CPAP, Parainfluenza type 1 infection (11/19/2022), Parapneumonic effusion, Pleural effusion, Pneumothorax on left (12/16/2022), Respiratory acidosis (11/12/2022), Respiratory arrest (H) (11/12/2022), Restless legs syndrome (RLS), SBP (spontaneous bacterial peritonitis) (H), Seizures (H) (12/2022), Sepsis due to Streptococcus pneumoniae with acute hypoxic respiratory failure (H) (11/19/2022), Stroke, embolic (H)  (2022), Sudden cardiac arrest (H) (2022), and Tubular adenoma (10/2019).  PSH:    has a past surgical history that includes colonoscopy (2013); appendectomy; hernia repair; Transplant liver recipient  donor (N/A, 2016); Bench liver (N/A, 2016); Colonoscopy (N/A, 10/04/2019); IR Gastrostomy Tube Percutaneous Plcmnt (2022); IR Paracentesis (2022); Tracheostomy (N/A, 2022); IR Paracentesis (2022); IR Transcatheter Biopsy (2022); IR CVC Tunnel Placement > 5 Yrs of Age (2022); IR Thoracentesis (2022); IR Chest Tube Place Non Tunneled Right (2022); Bronchoscopy flexible and rigid (N/A, 2022); IR Gastrostomy Tube Change (2023); Create fistula arteriovenous upper extremity (Left, 2023); IR Thoracentesis (2020); IR Thoracentesis (08/10/2020); IR Dialysis Fistulogram Left (2023); Revision Fistula Arteriovenous Upper Extremity (Left, 8/15/2023); Pericardiocentesis (N/A, 2023); Right Heart Catheterization (N/A, 10/11/2023); Pericardiocentesis (N/A, 10/11/2023); IR Paracentesis (2/10/2016); IR Paracentesis (2016); Revision Fistula Arteriovenous Upper Extremity (Left, 1/3/2024); IR CVC Tunnel Placement > 5 Yrs of Age (1/3/2024); Coronary Angiogram (N/A, 2024); Colonoscopy (N/A, 3/26/2024); and IR Transcatheter Biopsy (2024).  Social History:   reports that he has never smoked. He has never used smokeless tobacco. He reports that he does not currently use alcohol. He reports that he does not use drugs.  Family History:  family history is not on file.  Medications/Allergies: Home medications and allergies reviewed.     ROS:  The 10 point Review of Systems is negative other than noted in the HPI.     Physical Exam:  /70   Pulse 72   Resp 16   Ht 1.829 m (6')   Wt 95.3 kg (210 lb)   SpO2 95%   BMI 28.48 kg/m    GENERAL: Generally appears well.  Psych: Alert and Oriented.  Normal  affect  Eyes: Sclera clear  Respiratory:  Lungs clear to ausculation bilaterally with good air excursion  Cardiovascular:  Regular Rate and Rhythm with no murmurs gallops or rubs, normal peripheral pulses  GI: Abdomen Non Distended Soft Non-Tender large incisional hernia present relative to previous transplant..  Groin- I examined the patient in both the standing and supine positions. Right Groin- No hernia Palpated. Left Groin- Large inguinal hernia. No scrotal or testicle abnormalities.  Lymphatic/Hematologic/Immune:  No femoral or cervical lymphadenopathy.  Integumentary:  No rashes  Neurological: grossly intact      All new lab and imaging data was reviewed.      Impression and Plan:  Patient is a 59 year old male with ventral/incisional hernia as well as inguinal hernia.          PLAN: I discussed with him his management options.  Given the complexity of his abdominal wall as well as the complexity of his underlying health problems I have suggested to him that perhaps he would be best served with evaluation at his liver transplant center for evaluation and repair.  I discussed the pathophysiology of hernias and options for repair including laparoscopic VS open.  The risks associated with the procedure including, but not limited to, recurrence, nerve entrapment or injury, persistence of pain, injury to the bowel/bladder, infertility, hematoma, mesh migration, mesh infection, MI, and PE were discussed with the patient. He indicated understanding of the discussion, asked appropriate questions, and provided consent. Signs and symptoms of incarceration were discussed. If these develop in the interim, he promises to call or go straight to the ER. I have provided the patient with an information pamphlet.    Again, thank you for allowing me to participate in the care of your patient.      Sincerely,      Darwin Read MD

## 2024-10-10 NOTE — PROGRESS NOTES
[facility]        Patient is here for a follow up.    Pt is currently taking Aspirin and Warfarin.    /77 (BP Location: Right arm, Patient Position: Sitting, Cuff Size: Adult Regular)   Pulse 77     The provider has been notified that the patient has no concerns.     Questions patient would like addressed today are: N/A.    Refills are needed: N/A    Has homecare services and agency name:  Parul Owusu MA

## 2024-10-14 ENCOUNTER — TELEPHONE (OUTPATIENT)
Dept: ANTICOAGULATION | Facility: CLINIC | Age: 59
End: 2024-10-14
Payer: COMMERCIAL

## 2024-10-14 NOTE — TELEPHONE ENCOUNTER
ANTICOAGULATION     Blanco Osborne is overdue for an INR check.     Left message for patient to call and schedule lab appointment as soon as possible. If returning call, please schedule.     Lou Duong RN  10/14/2024  Anticoagulation Clinic  [facility] for routing messages: vashti MCGREGOR  [facility] patient phone line: [phone]

## 2024-10-18 ENCOUNTER — VIRTUAL VISIT (OUTPATIENT)
Dept: NEUROLOGY | Facility: CLINIC | Age: 59
End: 2024-10-18
Payer: COMMERCIAL

## 2024-10-18 DIAGNOSIS — G40.109 FOCAL EPILEPSY (H): ICD-10-CM

## 2024-10-18 PROCEDURE — G2211 COMPLEX E/M VISIT ADD ON: HCPCS | Performed by: PSYCHIATRY & NEUROLOGY

## 2024-10-18 PROCEDURE — 99213 OFFICE O/P EST LOW 20 MIN: CPT | Mod: 95 | Performed by: PSYCHIATRY & NEUROLOGY

## 2024-10-18 RX ORDER — LACOSAMIDE 50 MG/1
50 TABLET ORAL 2 TIMES DAILY
Qty: 186 TABLET | Refills: 3 | Status: SHIPPED | OUTPATIENT
Start: 2024-10-18

## 2024-10-18 NOTE — PROGRESS NOTES
" NEUROLOGY PROGRESS NOTE   [facility]    Patient:Blanco Osborne  : 1965  Age: 59 year old  Today's Virtual Visit: 2024    Background history:  Right-handed male with history of NAFLD, S/p Liver Transplant 2016 who presents to this clinic for evaluation and management of his seizures.  He is accompoanied by his brother, Dylan who contributes to the history.  The patient was admitted to the hospital on 2022 with acute respiratory failure due to strep pneumonia and parainfluenza in the setting of being immunosuppressed host given liver transplant and immunosuppresants.  He had septic shock.     Blanco does not recall much of the day of admission.  According to his brother, Dylan, he was not feeling well prior to admission to the hospital, with cold symptoms.  He was confused.  They called 911.  He collapsed when he wanted to walk to the ambualnce.  He had respiratory arrest.  He had 20 minutes CPR and shock and ROSC achieved.      Hospital notes: \"He had acute respiratory failure which slowly improved over time. Complicating factor is that he failed extubation twice. He had a right sided chest tube which was removed but then replaced when pneumothorax recurred.\"    \"He developed acute renal failure due to this illness and is dialysis dependent as of this note.\" He is currently on dialysis M, W, F.   He was transferred to [facility] on trach and PEG and 30% oxygen.     Prolonged video EEG was performed - at [facility].  The patient had 2 seizures during this monitoring.  First seizure occurred on .  He had gaze deviation to the right followed by head turn to the right followed by facial twitching that progressed to shoulder twitching on the right and then whole body low amplitude twitching.  Ictal EEG onset was unclear since it happened during impedance checks.  The patient had another seizure on .  It started with gaze deviation to the right and head turn to the right " followed by grimacing, stiffening and head jerks and right shoulder delivered followed by low amplitude body jerk and whole body twitching.  The seizure onset was left occipital region.     He was started on lacosamide and levetiracetam.  He is currently taking lacosamide 100 mg twice daily and levetiracetam 1000 mg daily. Medication side effects: Feeling tired and itchy.  Neither patient or his family noted any seizures since discharge from the hospital.  He never had any seizures in the past that they are aware of.     Epilepsy Risk Factors: Patient denies history of febrile seizures, meningitis, encephalitis, significant head injury or family history of epilepsy.  His seizure occurred in the setting of acute respiratory failure, septic shock and renal failure.  His MRI 12/16/2022 showed chronic right parietal encephalomalacia and multiple lacunar strokes in the left frontal, left temporal and posterior right frontal lobe, consistent with embolic strokes.    History of present illness:     Mr. Simeon is participating in this virtual visit for a follow up on his epilepsy.  He was last seen 4/30/2024.  His seizures have been controlled on lacosamide monotherapy.  He is taking lacosamide 50 mg bid.  He denies side effects.    Lacosamide level 5/9/24: 5.8    Other medical problems: He has end-stage renal disease, s/p hemodialysis.  He is on the list for renal transplant.     Current Outpatient Medications   Medication Sig Dispense Refill    acetaminophen (TYLENOL) 325 MG tablet Take 2 tablets (650 mg) by mouth every 8 hours as needed for other (For optimal non-opioid multimodal pain management to improve pain control.)      aspirin 81 MG EC tablet Take 1 tablet (81 mg) by mouth daily 90 tablet 3    gabapentin (NEURONTIN) 100 MG capsule TAKE 3 CAPSULES(300 MG) BY MOUTH AT BEDTIME 90 capsule 3    hydrOXYzine HCl (ATARAX) 25 MG tablet Take 1 tablet (25 mg) by mouth 3 times daily as needed for itching 90 tablet 1     lacosamide (VIMPAT) 50 MG TABS tablet Take 1 tablet (50 mg) by mouth 2 times daily 186 tablet 1    melatonin 3 MG tablet Take 1 tablet (3 mg) by mouth At Bedtime (Patient taking differently: Take 3 mg by mouth nightly as needed.) 90 tablet 1    midodrine (PROAMATINE) 10 MG tablet TAKE 1 TABLET 3 X DAILY. ON DIALYSIS DAYS(M, W, F) TAKE 2 EXTRA TABLETS 1 HOUR BEFORE, 1 TABLET WHEN YOU ARRIVE, AND 1 TABLET DURING DIALYSIS (Patient taking differently: Take 10 mg by mouth 3 times daily as needed.) 450 tablet 1    multivitamin RENAL (TRIPHROCAPS) 1 capsule capsule Take 1 capsule by mouth daily 90 capsule 3    omeprazole (PRILOSEC) 40 MG DR capsule Take 1 capsule (40 mg) by mouth daily. 90 capsule 1    oxyCODONE (ROXICODONE) 5 MG tablet Take 0.5-1 tablets (2.5-5 mg) by mouth every 6 hours as needed for severe pain. 15 tablet 0    rOPINIRole (REQUIP) 0.5 MG tablet Take 0.5 mg by mouth 2 times daily as needed      sevelamer carbonate (RENVELA) 800 MG tablet Take 1 tablet (800 mg) by mouth 4 times daily With meals and snacks 120 tablet 0    tacrolimus (GENERIC EQUIVALENT) 1 MG capsule Take 1 capsule (1 mg) by mouth every 12 hours 180 capsule 3    traZODone (DESYREL) 50 MG tablet Take 1 tablet (50 mg) by mouth at bedtime 90 tablet 1    warfarin ANTICOAGULANT (COUMADIN) 2 MG tablet Take 2 to 3 tablets by mouth every day OR as directed by Anticoagulation Clinic 252 tablet 1     Exam:    There were no vitals taken for this visit.     Wt Readings from Last 5 Encounters:   10/10/24 95.3 kg (210 lb)   09/26/24 95.3 kg (210 lb 1.6 oz)   06/28/24 93.4 kg (206 lb)   05/09/24 91 kg (200 lb 11.2 oz)   04/27/24 88.5 kg (195 lb)     Alert, awake, NAD, no aphasia or dysarthria, EOMI, face symmetric, moves upper extremities against gravity.      Assessment/Plan:     #1 Likely focal epilepsy: Seizures are controlled on lacosamide monotherapy.  Denies side effects.  Lacosamide level is within therapeutic range.  2 focal to bilateral  generalized tonic-clonic seizures were captured during video-EEG monitoring at [facility] which were identical semiologically.  Ictal EEG onset was not clear with one due to impedance check during seizure onset, and was left occipital and the other one.  Patient's MRI showed remote right parietal stroke and acute left frontotemporal and right frontal small embolic strokes.  The patient was in critical condition in the hospital, with septic shock, renal failure and likely electrolyte abnormalities.  He never had seizures prior to the hospital admission and no seizures were reported since discharge from the hospital.   Levetiracetam was tapered to off since last visit.  lacosamide was decreased to 50 mg bid.  He would like to come off lacosamide to be able to fly again.    I discussed possibility of seizure-recurrence since he has history of multiple strokes and is at risk of seizure recurrence. The patient understands and still wants to decrease his medication. He is hoping to taper this medication to off to be able to fly again.  I advised this is unlikely. He needs to do some research and finds out about other possible limitations, such as his strokes and Afib before deciding about discontinuing Vimpat.      #2 end-stage renal disease: he is on the list of transplant.  He is clear for surgery including kidney transplant from neurology/epilepsy perspective.       - Continue lacosamide to 50 mg bid.     - Follow up in 1 year.        As described above, I met with the patient for 9 minutes and during this time counseling was greater than 50% of the visit time.    Reba Laughlin MD

## 2024-10-18 NOTE — LETTER
"10/18/2024      Blanco Osborne  [address]   [address]  [facility] [address]      Dear Colleague,    Thank you for referring your patient, Blanco Osborne, to the [facility] [place]. Please see a copy of my visit note below.     NEUROLOGY PROGRESS NOTE   [facility]    Patient:Blanco Osborne  : 1965  Age: 59 year old  Today's Virtual Visit: 2024    Background history:  Right-handed male with history of NAFLD, S/p Liver Transplant 2016 who presents to this clinic for evaluation and management of his seizures.  He is accompoanied by his brother, Dylan who contributes to the history.  The patient was admitted to the hospital on 2022 with acute respiratory failure due to strep pneumonia and parainfluenza in the setting of being immunosuppressed host given liver transplant and immunosuppresants.  He had septic shock.     Blanco does not recall much of the day of admission.  According to his brother, Dylan, he was not feeling well prior to admission to the hospital, with cold symptoms.  He was confused.  They called 911.  He collapsed when he wanted to walk to the ambualnce.  He had respiratory arrest.  He had 20 minutes CPR and shock and ROSC achieved.      Hospital notes: \"He had acute respiratory failure which slowly improved over time. Complicating factor is that he failed extubation twice. He had a right sided chest tube which was removed but then replaced when pneumothorax recurred.\"    \"He developed acute renal failure due to this illness and is dialysis dependent as of this note.\" He is currently on dialysis M, W, F.   He was transferred to [facility] on trach and PEG and 30% oxygen.     Prolonged video EEG was performed - at [facility].  The patient had 2 seizures during this monitoring.  First seizure occurred on .  He had gaze deviation to the right followed by head turn to the right followed by facial twitching that progressed to " shoulder twitching on the right and then whole body low amplitude twitching.  Ictal EEG onset was unclear since it happened during impedance checks.  The patient had another seizure on 12/21.  It started with gaze deviation to the right and head turn to the right followed by grimacing, stiffening and head jerks and right shoulder delivered followed by low amplitude body jerk and whole body twitching.  The seizure onset was left occipital region.     He was started on lacosamide and levetiracetam.  He is currently taking lacosamide 100 mg twice daily and levetiracetam 1000 mg daily. Medication side effects: Feeling tired and itchy.  Neither patient or his family noted any seizures since discharge from the hospital.  He never had any seizures in the past that they are aware of.     Epilepsy Risk Factors: Patient denies history of febrile seizures, meningitis, encephalitis, significant head injury or family history of epilepsy.  His seizure occurred in the setting of acute respiratory failure, septic shock and renal failure.  His MRI 12/16/2022 showed chronic right parietal encephalomalacia and multiple lacunar strokes in the left frontal, left temporal and posterior right frontal lobe, consistent with embolic strokes.    History of present illness:     Mr. Simeon is participating in this virtual visit for a follow up on his epilepsy.  He was last seen 4/30/2024.  His seizures have been controlled on lacosamide monotherapy.  He is taking lacosamide 50 mg bid.  He denies side effects.    Lacosamide level 5/9/24: 5.8    Other medical problems: He has end-stage renal disease, s/p hemodialysis.  He is on the list for renal transplant.     Current Outpatient Medications   Medication Sig Dispense Refill    acetaminophen (TYLENOL) 325 MG tablet Take 2 tablets (650 mg) by mouth every 8 hours as needed for other (For optimal non-opioid multimodal pain management to improve pain control.)      aspirin 81 MG EC tablet Take 1  tablet (81 mg) by mouth daily 90 tablet 3    gabapentin (NEURONTIN) 100 MG capsule TAKE 3 CAPSULES(300 MG) BY MOUTH AT BEDTIME 90 capsule 3    hydrOXYzine HCl (ATARAX) 25 MG tablet Take 1 tablet (25 mg) by mouth 3 times daily as needed for itching 90 tablet 1    lacosamide (VIMPAT) 50 MG TABS tablet Take 1 tablet (50 mg) by mouth 2 times daily 186 tablet 1    melatonin 3 MG tablet Take 1 tablet (3 mg) by mouth At Bedtime (Patient taking differently: Take 3 mg by mouth nightly as needed.) 90 tablet 1    midodrine (PROAMATINE) 10 MG tablet TAKE 1 TABLET 3 X DAILY. ON DIALYSIS DAYS(M, W, F) TAKE 2 EXTRA TABLETS 1 HOUR BEFORE, 1 TABLET WHEN YOU ARRIVE, AND 1 TABLET DURING DIALYSIS (Patient taking differently: Take 10 mg by mouth 3 times daily as needed.) 450 tablet 1    multivitamin RENAL (TRIPHROCAPS) 1 capsule capsule Take 1 capsule by mouth daily 90 capsule 3    omeprazole (PRILOSEC) 40 MG DR capsule Take 1 capsule (40 mg) by mouth daily. 90 capsule 1    oxyCODONE (ROXICODONE) 5 MG tablet Take 0.5-1 tablets (2.5-5 mg) by mouth every 6 hours as needed for severe pain. 15 tablet 0    rOPINIRole (REQUIP) 0.5 MG tablet Take 0.5 mg by mouth 2 times daily as needed      sevelamer carbonate (RENVELA) 800 MG tablet Take 1 tablet (800 mg) by mouth 4 times daily With meals and snacks 120 tablet 0    tacrolimus (GENERIC EQUIVALENT) 1 MG capsule Take 1 capsule (1 mg) by mouth every 12 hours 180 capsule 3    traZODone (DESYREL) 50 MG tablet Take 1 tablet (50 mg) by mouth at bedtime 90 tablet 1    warfarin ANTICOAGULANT (COUMADIN) 2 MG tablet Take 2 to 3 tablets by mouth every day OR as directed by Anticoagulation Clinic 252 tablet 1     Exam:    There were no vitals taken for this visit.     Wt Readings from Last 5 Encounters:   10/10/24 95.3 kg (210 lb)   09/26/24 95.3 kg (210 lb 1.6 oz)   06/28/24 93.4 kg (206 lb)   05/09/24 91 kg (200 lb 11.2 oz)   04/27/24 88.5 kg (195 lb)     Alert, awake, NAD, no aphasia or dysarthria,  EOMI, face symmetric, moves upper extremities against gravity.    Assessment/Plan:   #1 Likely focal epilepsy: Seizures are controlled on lacosamide monotherapy.  Denies side effects.  Lacosamide level is within therapeutic range.  2 focal to bilateral generalized tonic-clonic seizures were captured during video-EEG monitoring at [facility] which were identical semiologically.  Ictal EEG onset was not clear with one due to impedance check during seizure onset, and was left occipital and the other one.  Patient's MRI showed remote right parietal stroke and acute left frontotemporal and right frontal small embolic strokes.  The patient was in critical condition in the hospital, with septic shock, renal failure and likely electrolyte abnormalities.  He never had seizures prior to the hospital admission and no seizures were reported since discharge from the hospital.   Levetiracetam was tapered to off since last visit.  lacosamide was decreased to 50 mg bid.  He would like to come off lacosamide to be able to fly again.    I discussed possibility of seizure-recurrence since he has history of multiple strokes and is at risk of seizure recurrence. The patient understands and still wants to decrease his medication. He is hoping to taper this medication to off to be able to fly again.  I advised this is unlikely. He needs to do some research and finds out about other possible limitations, such as his strokes and Afib before deciding about discontinuing Vimpat.      #2 end-stage renal disease: he is on the list of transplant.  He is clear for surgery including kidney transplant from neurology/epilepsy perspective.       - Continue lacosamide to 50 mg bid.     - Follow up in 1 year.      As described above, I met with the patient for 9 minutes and during this time counseling was greater than 50% of the visit time.      Again, thank you for allowing me to participate in the care of your patient.      Sincerely,    Reba  MD Qian

## 2024-10-22 ENCOUNTER — DOCUMENTATION ONLY (OUTPATIENT)
Dept: TRANSPLANT | Facility: CLINIC | Age: 59
End: 2024-10-22
Payer: COMMERCIAL

## 2024-10-24 NOTE — PROGRESS NOTES
[facility]  Surgery Consultation    CONSULTATION REQUESTED BY:  Ki Carter [phone]    HPI: This patient is a medically as well as surgically complex 59-year-old gentleman with a prior history of liver transplantation who presents for evaluation of new left inguinal hernia.  Patient does have a prior history of previous incarcerated hernia repair in the past.  He has no signs or symptoms of incarceration or strangulation.  No GI or bowel obstruction symptoms.    PMH:   has a past medical history of Acquired immunocompromised state (H) (11/19/2022), Acute renal failure, unspecified acute renal failure type (H) (11/12/2022), Antiplatelet or antithrombotic long-term use, Arrhythmia, Ascites, Aspergillus pneumonia (H) (11/19/2022), Cancer (H) (2023), Cirrhosis of liver with ascites (H) (02/11/2016), Coagulopathy (H), Critical illness myopathy, Dialysis patient (H), Embolic stroke (H) (11/20/2022), Encephalopathy, ESRD (end stage renal disease) on dialysis (H), Gastroesophageal reflux disease, H/O alcohol abuse, History of blood transfusion, Hyperammonemia (H), Hyperlipidemia, Hypertension, 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) (11/19/2022), Insomnia, Lactic acidosis (11/12/2022), Liver replaced by transplant (H) (09/20/2016), NAFLD (nonalcoholic fatty liver disease) (02/11/2016), CHRISTIAN on CPAP, Parainfluenza type 1 infection (11/19/2022), Parapneumonic effusion, Pleural effusion, Pneumothorax on left (12/16/2022), Respiratory acidosis (11/12/2022), Respiratory arrest (H) (11/12/2022), Restless legs syndrome (RLS), SBP (spontaneous bacterial peritonitis) (H), Seizures (H) (12/2022), Sepsis due to Streptococcus pneumoniae with acute hypoxic respiratory failure (H) (11/19/2022), Stroke, embolic (H) (11/20/2022), Sudden cardiac arrest (H) (12/29/2022), and Tubular adenoma (10/2019).  PSH:    has a past surgical history that includes colonoscopy (08/06/2013); appendectomy; hernia repair;  Transplant liver recipient  donor (N/A, 2016); Bench liver (N/A, 2016); Colonoscopy (N/A, 10/04/2019); IR Gastrostomy Tube Percutaneous Plcmnt (2022); IR Paracentesis (2022); Tracheostomy (N/A, 2022); IR Paracentesis (2022); IR Transcatheter Biopsy (2022); IR CVC Tunnel Placement > 5 Yrs of Age (2022); IR Thoracentesis (2022); IR Chest Tube Place Non Tunneled Right (2022); Bronchoscopy flexible and rigid (N/A, 2022); IR Gastrostomy Tube Change (2023); Create fistula arteriovenous upper extremity (Left, 2023); IR Thoracentesis (2020); IR Thoracentesis (08/10/2020); IR Dialysis Fistulogram Left (2023); Revision Fistula Arteriovenous Upper Extremity (Left, 8/15/2023); Pericardiocentesis (N/A, 2023); Right Heart Catheterization (N/A, 10/11/2023); Pericardiocentesis (N/A, 10/11/2023); IR Paracentesis (2/10/2016); IR Paracentesis (2016); Revision Fistula Arteriovenous Upper Extremity (Left, 1/3/2024); IR CVC Tunnel Placement > 5 Yrs of Age (1/3/2024); Coronary Angiogram (N/A, 2024); Colonoscopy (N/A, 3/26/2024); and IR Transcatheter Biopsy (2024).  Social History:   reports that he has never smoked. He has never used smokeless tobacco. He reports that he does not currently use alcohol. He reports that he does not use drugs.  Family History:  family history is not on file.  Medications/Allergies: Home medications and allergies reviewed.    ROS:  The 10 point Review of Systems is negative other than noted in the HPI.    Physical Exam:  /70   Pulse 72   Resp 16   Ht 1.829 m (6')   Wt 95.3 kg (210 lb)   SpO2 95%   BMI 28.48 kg/m    GENERAL: Generally appears well.  Psych: Alert and Oriented.  Normal affect  Eyes: Sclera clear  Respiratory:  Lungs clear to ausculation bilaterally with good air excursion  Cardiovascular:  Regular Rate and Rhythm with no murmurs gallops or rubs, normal peripheral  pulses  GI: Abdomen Non Distended Soft Non-Tender large incisional hernia present relative to previous transplant..  Groin- I examined the patient in both the standing and supine positions. Right Groin- No hernia Palpated. Left Groin- Large inguinal hernia. No scrotal or testicle abnormalities.  Lymphatic/Hematologic/Immune:  No femoral or cervical lymphadenopathy.  Integumentary:  No rashes  Neurological: grossly intact     All new lab and imaging data was reviewed.     Impression and Plan:  Patient is a 59 year old male with ventral/incisional hernia as well as inguinal hernia.        PLAN: I discussed with him his management options.  Given the complexity of his abdominal wall as well as the complexity of his underlying health problems I have suggested to him that perhaps he would be best served with evaluation at his liver transplant center for evaluation and repair.  I discussed the pathophysiology of hernias and options for repair including laparoscopic VS open.  The risks associated with the procedure including, but not limited to, recurrence, nerve entrapment or injury, persistence of pain, injury to the bowel/bladder, infertility, hematoma, mesh migration, mesh infection, MI, and PE were discussed with the patient. He indicated understanding of the discussion, asked appropriate questions, and provided consent. Signs and symptoms of incarceration were discussed. If these develop in the interim, he promises to call or go straight to the ER. I have provided the patient with an information pamphlet.    Thank you very much for this consult.    Darwin Read M.D.  [facility]  [phone]    Please route or send letter to:  Primary Care Provider (PCP) and Referring Provider

## 2024-10-28 ENCOUNTER — TELEPHONE (OUTPATIENT)
Dept: ANTICOAGULATION | Facility: CLINIC | Age: 59
End: 2024-10-28
Payer: COMMERCIAL

## 2024-10-28 NOTE — TELEPHONE ENCOUNTER
ANTICOAGULATION     Blanco Osborne is overdue for an INR check.     Was unable to reach patient and was unable to leave a voicemail. If patient calls, please schedule INR check as soon as possible.  and Reminder letter sent    Lou Duong RN  10/28/2024  Anticoagulation Clinic  [facility] for routing messages: vashti MCGREGOR  [facility] patient phone line: [phone]

## 2024-10-28 NOTE — LETTER
October 28, 2024      Blanco Osborne  [address]  [facility] [address]        Dear Blanco,   October 28, 2024        Blanco Osborne  [address]  [facility] [address]            Dear Blanco,    You are currently under the care of [facility] for your warfarin (Coumadin , Jantoven ) therapy.  We are contacting you because our records show you were due for an INR on 10/4/24.    There are potentially serious risks when taking warfarin without careful monitoring and we want to make sure you are safely managed.  Routine lab monitoring is required for warfarin refills.     Please call [phone] as soon as possible to schedule a lab appointment. If it is difficult for you to get to lab, please call us to discuss options.  If there has been a change in your care or other concerns, please let us know so we can help and/or update our records.         Sincerely,       [facility]

## 2024-11-03 ENCOUNTER — APPOINTMENT (OUTPATIENT)
Dept: CT IMAGING | Facility: CLINIC | Age: 59
End: 2024-11-03
Attending: EMERGENCY MEDICINE
Payer: COMMERCIAL

## 2024-11-03 ENCOUNTER — HOSPITAL ENCOUNTER (EMERGENCY)
Facility: CLINIC | Age: 59
Discharge: HOME | End: 2024-11-04
Attending: EMERGENCY MEDICINE | Admitting: EMERGENCY MEDICINE
Payer: COMMERCIAL

## 2024-11-03 VITALS
SYSTOLIC BLOOD PRESSURE: 122 MMHG | DIASTOLIC BLOOD PRESSURE: 80 MMHG | RESPIRATION RATE: 18 BRPM | TEMPERATURE: 99.4 F | OXYGEN SATURATION: 91 % | HEART RATE: 96 BPM

## 2024-11-03 DIAGNOSIS — R10.9 ABDOMINAL PAIN, UNSPECIFIED ABDOMINAL LOCATION: ICD-10-CM

## 2024-11-03 DIAGNOSIS — R18.8 OTHER ASCITES: ICD-10-CM

## 2024-11-03 DIAGNOSIS — J90 PLEURAL EFFUSION: ICD-10-CM

## 2024-11-03 DIAGNOSIS — S09.90XA CLOSED HEAD INJURY, INITIAL ENCOUNTER: ICD-10-CM

## 2024-11-03 DIAGNOSIS — S29.9XXA CHEST INJURY, INITIAL ENCOUNTER: ICD-10-CM

## 2024-11-03 DIAGNOSIS — S39.92XA INJURY OF BACK, INITIAL ENCOUNTER: ICD-10-CM
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
## 2024-11-03 LAB
ABO/RH(D): NORMAL
ALBUMIN SERPL BCG-MCNC: 4 G/DL (ref 3.5–5.2)
ALP SERPL-CCNC: 238 U/L (ref 40–150)
ALT SERPL W P-5'-P-CCNC: 10 U/L (ref 0–70)
ANION GAP SERPL CALCULATED.3IONS-SCNC: 13 MMOL/L (ref 7–15)
ANTIBODY SCREEN: NEGATIVE
AST SERPL W P-5'-P-CCNC: 18 U/L (ref 0–45)
BASOPHILS # BLD AUTO: 0.1 10E3/UL (ref 0–0.2)
BASOPHILS NFR BLD AUTO: 1 %
BILIRUB SERPL-MCNC: 0.7 MG/DL
BUN SERPL-MCNC: 42.9 MG/DL (ref 8–23)
CALCIUM SERPL-MCNC: 9.2 MG/DL (ref 8.8–10.4)
CHLORIDE SERPL-SCNC: 96 MMOL/L (ref 98–107)
CREAT SERPL-MCNC: 9.31 MG/DL (ref 0.67–1.17)
EGFRCR SERPLBLD CKD-EPI 2021: 6 ML/MIN/1.73M2
EOSINOPHIL # BLD AUTO: 0.1 10E3/UL (ref 0–0.7)
EOSINOPHIL NFR BLD AUTO: 2 %
ERYTHROCYTE [DISTWIDTH] IN BLOOD BY AUTOMATED COUNT: 14.6 % (ref 10–15)
GLUCOSE SERPL-MCNC: 126 MG/DL (ref 70–99)
HCO3 SERPL-SCNC: 27 MMOL/L (ref 22–29)
HCT VFR BLD AUTO: 33.3 % (ref 40–53)
HGB BLD-MCNC: 10.9 G/DL (ref 13.3–17.7)
IMM GRANULOCYTES # BLD: 0 10E3/UL
IMM GRANULOCYTES NFR BLD: 0 %
INR PPP: 1.32 (ref 0.85–1.15)
LYMPHOCYTES # BLD AUTO: 1.2 10E3/UL (ref 0.8–5.3)
LYMPHOCYTES NFR BLD AUTO: 20 %
MCH RBC QN AUTO: 34.4 PG (ref 26.5–33)
MCHC RBC AUTO-ENTMCNC: 32.7 G/DL (ref 31.5–36.5)
MCV RBC AUTO: 105 FL (ref 78–100)
MONOCYTES # BLD AUTO: 0.7 10E3/UL (ref 0–1.3)
MONOCYTES NFR BLD AUTO: 12 %
NEUTROPHILS # BLD AUTO: 3.7 10E3/UL (ref 1.6–8.3)
NEUTROPHILS NFR BLD AUTO: 64 %
NRBC # BLD AUTO: 0 10E3/UL
NRBC BLD AUTO-RTO: 0 /100
PLATELET # BLD AUTO: 90 10E3/UL (ref 150–450)
POTASSIUM SERPL-SCNC: 4.4 MMOL/L (ref 3.4–5.3)
PROT SERPL-MCNC: 7.5 G/DL (ref 6.4–8.3)
RBC # BLD AUTO: 3.17 10E6/UL (ref 4.4–5.9)
SODIUM SERPL-SCNC: 136 MMOL/L (ref 135–145)
SPECIMEN EXPIRATION DATE: NORMAL
WBC # BLD AUTO: 5.8 10E3/UL (ref 4–11)

## 2024-11-03 PROCEDURE — 250N000011 HC RX IP 250 OP 636: Performed by: EMERGENCY MEDICINE

## 2024-11-03 PROCEDURE — 36415 COLL VENOUS BLD VENIPUNCTURE: CPT | Performed by: EMERGENCY MEDICINE

## 2024-11-03 PROCEDURE — 85025 COMPLETE CBC W/AUTO DIFF WBC: CPT | Performed by: EMERGENCY MEDICINE

## 2024-11-03 PROCEDURE — 450N000002 HC PARTIAL TTA LEVEL III WITHOUT PRE-HOSPITAL NOTIFICATION

## 2024-11-03 PROCEDURE — 96374 THER/PROPH/DIAG INJ IV PUSH: CPT | Mod: 59

## 2024-11-03 PROCEDURE — 80053 COMPREHEN METABOLIC PANEL: CPT | Performed by: EMERGENCY MEDICINE

## 2024-11-03 PROCEDURE — 250N000009 HC RX 250: Performed by: EMERGENCY MEDICINE

## 2024-11-03 PROCEDURE — 82040 ASSAY OF SERUM ALBUMIN: CPT | Performed by: EMERGENCY MEDICINE

## 2024-11-03 PROCEDURE — 72131 CT LUMBAR SPINE W/O DYE: CPT

## 2024-11-03 PROCEDURE — 250N000013 HC RX MED GY IP 250 OP 250 PS 637: Performed by: EMERGENCY MEDICINE

## 2024-11-03 PROCEDURE — 86901 BLOOD TYPING SEROLOGIC RH(D): CPT | Performed by: EMERGENCY MEDICINE

## 2024-11-03 PROCEDURE — 70450 CT HEAD/BRAIN W/O DYE: CPT

## 2024-11-03 PROCEDURE — 86900 BLOOD TYPING SEROLOGIC ABO: CPT | Performed by: EMERGENCY MEDICINE

## 2024-11-03 PROCEDURE — 74177 CT ABD & PELVIS W/CONTRAST: CPT

## 2024-11-03 PROCEDURE — 85610 PROTHROMBIN TIME: CPT | Performed by: EMERGENCY MEDICINE

## 2024-11-03 PROCEDURE — 72128 CT CHEST SPINE W/O DYE: CPT

## 2024-11-03 PROCEDURE — 99285 EMERGENCY DEPT VISIT HI MDM: CPT | Mod: 25

## 2024-11-03 PROCEDURE — 72125 CT NECK SPINE W/O DYE: CPT

## 2024-11-03 RX ORDER — HYDROMORPHONE HYDROCHLORIDE 1 MG/ML
0.5 INJECTION, SOLUTION INTRAMUSCULAR; INTRAVENOUS; SUBCUTANEOUS ONCE
Status: COMPLETED | OUTPATIENT
Start: 2024-11-03 | End: 2024-11-03

## 2024-11-03 RX ORDER — IOPAMIDOL 755 MG/ML
105 INJECTION, SOLUTION INTRAVASCULAR ONCE
Status: COMPLETED | OUTPATIENT
Start: 2024-11-03 | End: 2024-11-03

## 2024-11-03 RX ORDER — OXYCODONE HYDROCHLORIDE 5 MG/1
5 TABLET ORAL ONCE
Status: COMPLETED | OUTPATIENT
Start: 2024-11-03 | End: 2024-11-03

## 2024-11-03 RX ORDER — OXYCODONE HYDROCHLORIDE 5 MG/1
5 TABLET ORAL EVERY 6 HOURS PRN
Qty: 12 TABLET | Refills: 0 | Status: SHIPPED | OUTPATIENT
Start: 2024-11-03 | End: 2024-11-06

## 2024-11-03 RX ADMIN — OXYCODONE HYDROCHLORIDE 5 MG: 5 TABLET ORAL at 23:47

## 2024-11-03 RX ADMIN — IOPAMIDOL 105 ML: 755 INJECTION, SOLUTION INTRAVENOUS at 21:55

## 2024-11-03 RX ADMIN — HYDROMORPHONE HYDROCHLORIDE 0.5 MG: 1 INJECTION, SOLUTION INTRAMUSCULAR; INTRAVENOUS; SUBCUTANEOUS at 22:11

## 2024-11-03 RX ADMIN — SODIUM CHLORIDE 72 ML: 9 INJECTION, SOLUTION INTRAVENOUS at 21:56

## 2024-11-03 ASSESSMENT — ACTIVITIES OF DAILY LIVING (ADL)
ADLS_ACUITY_SCORE: 0

## 2024-11-04 ENCOUNTER — TELEPHONE (OUTPATIENT)
Dept: ANTICOAGULATION | Facility: CLINIC | Age: 59
End: 2024-11-04
Payer: COMMERCIAL

## 2024-11-04 DIAGNOSIS — I48.91 ATRIAL FIBRILLATION WITH RAPID VENTRICULAR RESPONSE (H): ICD-10-CM

## 2024-11-04 DIAGNOSIS — I63.10 CEREBROVASCULAR ACCIDENT (CVA) DUE TO EMBOLISM OF PRECEREBRAL ARTERY (H): ICD-10-CM

## 2024-11-04 DIAGNOSIS — Z76.82 ORGAN TRANSPLANT CANDIDATE: Primary | ICD-10-CM

## 2024-11-04 DIAGNOSIS — Z79.01 ANTICOAGULATED: ICD-10-CM
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
## 2024-11-05 ENCOUNTER — TELEPHONE (OUTPATIENT)
Dept: TRANSPLANT | Facility: CLINIC | Age: 59
End: 2024-11-05
Payer: COMMERCIAL

## 2024-11-05 NOTE — TELEPHONE ENCOUNTER
Hepatitis C Donor Acceptance      -Called pt in regards to the possibility of accepting a  or living donor kidney that has known Hepatitis C infection (past or current).  Informed pt due to shortage of organs, the transplant team is always looking for safe alternatives to increase chances of transplant. One means of increasing chance of transplant is to accept an organ that may have been exposed to hepatitis C.  Even if the organ accepted has preliminary evidence of hepatitis C infection, there is a possibility that pt may not contract hepatitis C from it.  If hepatitis C is contracted, it is treatable.     -Reviewed that Hepatitis C (HCV) is an infection caused by the Hepatitis C virus. Hepatitis C may be spread from one person to another through contact with the blood of an infected person.  This virus can attack the liver and cause injury. If not treated, this virus can lead to liver injury.     -Reviewed that all  donor offer organs are checked for hepatitis C infection. A small number of organs test clearly positive for active hepatitis C infection (Approximately 5% nationally of  donors). A few organs have testing that can indicate the donor may have been infected with hepatitis C but it is not clear whether the organ is currently infected with the virus or may have been infected in the past.     -If the donor has been exposed to hepatitis C but DOES NOT have active virus in the bloodstream, it is very unlikely that pt will develop hepatitis C infection. If the donor DOES have active virus in the bloodstream, there is a very high likelihood (almost 100%) that hep C will develop. Regardless, pt will be monitored closely by your post transplant team for any signs of Hepatitis C infection and treated appropriately.     -Reviewed there are several medications available to treat hepatitis C, and the treatments are more effective and have fewer side effects than previous treatment options.  These medications are taken by mouth daily for 4 to 24 weeks.  When taken as prescribed, these medications have cure rates of 90% to 100% based on currently available data from large studies.  They are safe to take after transplant.     -Reviewed that if pt chooses not to accept a hepatitis C exposed donor, it will not affect their status on the transplant list.  Donor organs that are hepatitis C-exposed will not be offered to you. We will continue to consider non-hepatitis C -exposed donor offers for you. Pt care will not be negatively affected in any way.      -Answered patient questions regarding Hep C donor acceptance     -Pt would like to be listed to accept Hep C positive donors .  Will send consent and pt education via [employer]. Pt is aware that consent will be required prior to listing change. Once signed consent is uploaded will update chart and [facility] appropriately

## 2024-11-06 ENCOUNTER — ANTICOAGULATION THERAPY VISIT (OUTPATIENT)
Dept: ANTICOAGULATION | Facility: CLINIC | Age: 59
End: 2024-11-06

## 2024-11-06 ENCOUNTER — TELEPHONE (OUTPATIENT)
Dept: FAMILY MEDICINE | Facility: CLINIC | Age: 59
End: 2024-11-06

## 2024-11-06 ENCOUNTER — OFFICE VISIT (OUTPATIENT)
Dept: FAMILY MEDICINE | Facility: CLINIC | Age: 59
End: 2024-11-06
Payer: COMMERCIAL

## 2024-11-06 VITALS
DIASTOLIC BLOOD PRESSURE: 80 MMHG | BODY MASS INDEX: 27.77 KG/M2 | RESPIRATION RATE: 18 BRPM | HEART RATE: 75 BPM | SYSTOLIC BLOOD PRESSURE: 117 MMHG | OXYGEN SATURATION: 98 % | HEIGHT: 72 IN | TEMPERATURE: 96.9 F | WEIGHT: 205 LBS

## 2024-11-06 DIAGNOSIS — Z99.2 ESRD (END STAGE RENAL DISEASE) ON DIALYSIS (H): ICD-10-CM

## 2024-11-06 DIAGNOSIS — R18.8 CIRRHOSIS OF LIVER WITH ASCITES, UNSPECIFIED HEPATIC CIRRHOSIS TYPE (H): ICD-10-CM

## 2024-11-06 DIAGNOSIS — I48.91 ATRIAL FIBRILLATION WITH RAPID VENTRICULAR RESPONSE (H): ICD-10-CM

## 2024-11-06 DIAGNOSIS — Z79.01 ANTICOAGULATED: ICD-10-CM

## 2024-11-06 DIAGNOSIS — K74.60 CIRRHOSIS OF LIVER WITH ASCITES, UNSPECIFIED HEPATIC CIRRHOSIS TYPE (H): ICD-10-CM

## 2024-11-06 DIAGNOSIS — M54.50 ACUTE BILATERAL LOW BACK PAIN WITHOUT SCIATICA: ICD-10-CM

## 2024-11-06 DIAGNOSIS — R14.0 ABDOMINAL DISTENTION: ICD-10-CM

## 2024-11-06 DIAGNOSIS — I63.10 CEREBROVASCULAR ACCIDENT (CVA) DUE TO EMBOLISM OF PRECEREBRAL ARTERY (H): ICD-10-CM

## 2024-11-06 DIAGNOSIS — Z76.82 ORGAN TRANSPLANT CANDIDATE: Primary | ICD-10-CM
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
## 2024-11-06 DIAGNOSIS — N18.6 ESRD (END STAGE RENAL DISEASE) ON DIALYSIS (H): ICD-10-CM

## 2024-11-06 DIAGNOSIS — Z09 HOSPITAL DISCHARGE FOLLOW-UP: Primary | ICD-10-CM

## 2024-11-06 DIAGNOSIS — S13.4XXD WHIPLASH INJURY TO NECK, SUBSEQUENT ENCOUNTER: ICD-10-CM

## 2024-11-06 DIAGNOSIS — Z94.4 LIVER TRANSPLANTED (H): ICD-10-CM

## 2024-11-06 LAB
ERYTHROCYTE [DISTWIDTH] IN BLOOD BY AUTOMATED COUNT: 14.9 % (ref 10–15)
HCT VFR BLD AUTO: 38.7 % (ref 40–53)
HGB BLD-MCNC: 12.1 G/DL (ref 13.3–17.7)
INR BLD: 1.2 (ref 0.9–1.1)
MCH RBC QN AUTO: 33.5 PG (ref 26.5–33)
MCHC RBC AUTO-ENTMCNC: 31.3 G/DL (ref 31.5–36.5)
MCV RBC AUTO: 107 FL (ref 78–100)
PLATELET # BLD AUTO: 124 10E3/UL (ref 150–450)
RBC # BLD AUTO: 3.61 10E6/UL (ref 4.4–5.9)
WBC # BLD AUTO: 6.3 10E3/UL (ref 4–11)

## 2024-11-06 PROCEDURE — 36416 COLLJ CAPILLARY BLOOD SPEC: CPT | Performed by: PHYSICIAN ASSISTANT

## 2024-11-06 PROCEDURE — 85610 PROTHROMBIN TIME: CPT | Performed by: PHYSICIAN ASSISTANT

## 2024-11-06 PROCEDURE — 85027 COMPLETE CBC AUTOMATED: CPT | Performed by: PHYSICIAN ASSISTANT

## 2024-11-06 PROCEDURE — 99215 OFFICE O/P EST HI 40 MIN: CPT | Performed by: PHYSICIAN ASSISTANT

## 2024-11-06 PROCEDURE — 36415 COLL VENOUS BLD VENIPUNCTURE: CPT | Performed by: PHYSICIAN ASSISTANT

## 2024-11-06 RX ORDER — OXYCODONE HYDROCHLORIDE 5 MG/1
5 TABLET ORAL EVERY 6 HOURS PRN
Qty: 20 TABLET | Refills: 0 | Status: SHIPPED | OUTPATIENT
Start: 2024-11-06

## 2024-11-06 ASSESSMENT — PAIN SCALES - GENERAL: PAINLEVEL_OUTOF10: SEVERE PAIN (7)
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
## 2024-11-06 NOTE — CONFIDENTIAL NOTE
"  Pt's spouse came to  requesting to have Blanco's order for \"paracentesis\"  to be corrected to a \"needs therapy order.\"     Sepideh at the [facility] can be called at:[phone] hit prompts of#3 then #2. She can clarify this information.    Please let Blanco know when this is completed or if you have further questions at:[phone].    Thank you.  "

## 2024-11-07 ENCOUNTER — TELEPHONE (OUTPATIENT)
Dept: TRANSPLANT | Facility: CLINIC | Age: 59
End: 2024-11-07
Payer: COMMERCIAL

## 2024-11-07 DIAGNOSIS — Z94.4 LIVER TRANSPLANTED (H): ICD-10-CM

## 2024-11-07 DIAGNOSIS — R18.8 ASCITES: Primary | ICD-10-CM

## 2024-11-07 DIAGNOSIS — K76.0 NAFLD (NONALCOHOLIC FATTY LIVER DISEASE): ICD-10-CM

## 2024-11-07 DIAGNOSIS — R18.8 OTHER ASCITES: ICD-10-CM

## 2024-11-07 RX ORDER — ALBUMIN (HUMAN) 12.5 G/50ML
12.5 SOLUTION INTRAVENOUS
Status: CANCELLED | OUTPATIENT
Start: 2024-11-07

## 2024-11-07 RX ORDER — LIDOCAINE HYDROCHLORIDE 10 MG/ML
20 INJECTION, SOLUTION EPIDURAL; INFILTRATION; INTRACAUDAL; PERINEURAL ONCE
Status: CANCELLED | OUTPATIENT
Start: 2024-11-07 | End: 2024-11-07

## 2024-11-07 RX ORDER — EPINEPHRINE 1 MG/ML
0.3 INJECTION, SOLUTION, CONCENTRATE INTRAVENOUS EVERY 5 MIN PRN
OUTPATIENT
Start: 2024-11-07

## 2024-11-07 RX ORDER — HEPARIN SODIUM (PORCINE) LOCK FLUSH IV SOLN 100 UNIT/ML 100 UNIT/ML
5 SOLUTION INTRAVENOUS
OUTPATIENT
Start: 2024-11-07

## 2024-11-07 RX ORDER — DIPHENHYDRAMINE HYDROCHLORIDE 50 MG/ML
25 INJECTION INTRAMUSCULAR; INTRAVENOUS
Start: 2024-11-07

## 2024-11-07 RX ORDER — MEPERIDINE HYDROCHLORIDE 25 MG/ML
25 INJECTION INTRAMUSCULAR; INTRAVENOUS; SUBCUTANEOUS
OUTPATIENT
Start: 2024-11-07

## 2024-11-07 RX ORDER — METHYLPREDNISOLONE SODIUM SUCCINATE 40 MG/ML
40 INJECTION INTRAMUSCULAR; INTRAVENOUS
Start: 2024-11-07

## 2024-11-07 RX ORDER — ALBUTEROL SULFATE 90 UG/1
1-2 INHALANT RESPIRATORY (INHALATION)
Start: 2024-11-07

## 2024-11-07 RX ORDER — DIPHENHYDRAMINE HYDROCHLORIDE 50 MG/ML
50 INJECTION INTRAMUSCULAR; INTRAVENOUS
Start: 2024-11-07

## 2024-11-07 RX ORDER — HEPARIN SODIUM,PORCINE 10 UNIT/ML
5-20 VIAL (ML) INTRAVENOUS DAILY PRN
OUTPATIENT
Start: 2024-11-07

## 2024-11-07 RX ORDER — ALBUTEROL SULFATE 0.83 MG/ML
2.5 SOLUTION RESPIRATORY (INHALATION)
OUTPATIENT
Start: 2024-11-07
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
## 2024-11-08 NOTE — RESULT ENCOUNTER NOTE
Blanco,    I was happy to see that you were finally able to get scheduled for your paracentesis.  Fortunately your hemoglobin is fine/better at 12.1.      Ene Dash PA-C

## 2024-11-12 ENCOUNTER — ANCILLARY PROCEDURE (OUTPATIENT)
Dept: ULTRASOUND IMAGING | Facility: CLINIC | Age: 59
End: 2024-11-12
Attending: INTERNAL MEDICINE
Payer: COMMERCIAL

## 2024-11-12 ENCOUNTER — TELEPHONE (OUTPATIENT)
Dept: TRANSPLANT | Facility: CLINIC | Age: 59
End: 2024-11-12

## 2024-11-12 ENCOUNTER — OFFICE VISIT (OUTPATIENT)
Dept: INFUSION THERAPY | Facility: CLINIC | Age: 59
End: 2024-11-12
Payer: COMMERCIAL

## 2024-11-12 VITALS
DIASTOLIC BLOOD PRESSURE: 68 MMHG | OXYGEN SATURATION: 96 % | WEIGHT: 198.1 LBS | RESPIRATION RATE: 18 BRPM | HEART RATE: 81 BPM | SYSTOLIC BLOOD PRESSURE: 102 MMHG | TEMPERATURE: 97.7 F | BODY MASS INDEX: 26.87 KG/M2

## 2024-11-12 DIAGNOSIS — R18.8 OTHER ASCITES: Primary | ICD-10-CM

## 2024-11-12 DIAGNOSIS — K46.9 ABDOMINAL HERNIA: Primary | ICD-10-CM

## 2024-11-12 DIAGNOSIS — Z94.4 LIVER TRANSPLANTED (H): ICD-10-CM

## 2024-11-12 DIAGNOSIS — R18.8 ASCITES: ICD-10-CM

## 2024-11-12 DIAGNOSIS — K76.0 NAFLD (NONALCOHOLIC FATTY LIVER DISEASE): ICD-10-CM

## 2024-11-12 LAB
ABSOLUTE NEUTROPHILS, BODY FLUID: 14.6 /UL
APPEARANCE FLD: ABNORMAL
CELL COUNT BODY FLUID SOURCE: ABNORMAL
COLOR FLD: ABNORMAL
LYMPHOCYTES NFR FLD MANUAL: 41 %
MONOS+MACROS NFR FLD MANUAL: 57 %
NEUTS BAND NFR FLD MANUAL: 3 %
PROT FLD-MCNC: 4.7 G/DL
PROTEIN BODY FLUID SOURCE: NORMAL
WBC # FLD AUTO: 582 /UL

## 2024-11-12 PROCEDURE — 96372 THER/PROPH/DIAG INJ SC/IM: CPT | Performed by: INTERNAL MEDICINE

## 2024-11-12 PROCEDURE — 250N000009 HC RX 250: Performed by: INTERNAL MEDICINE

## 2024-11-12 PROCEDURE — 87070 CULTURE OTHR SPECIMN AEROBIC: CPT | Performed by: INTERNAL MEDICINE

## 2024-11-12 PROCEDURE — 49083 ABD PARACENTESIS W/IMAGING: CPT

## 2024-11-12 PROCEDURE — 49083 ABD PARACENTESIS W/IMAGING: CPT | Performed by: RADIOLOGY

## 2024-11-12 PROCEDURE — 89051 BODY FLUID CELL COUNT: CPT | Performed by: INTERNAL MEDICINE

## 2024-11-12 PROCEDURE — 84157 ASSAY OF PROTEIN OTHER: CPT | Performed by: INTERNAL MEDICINE

## 2024-11-12 RX ORDER — EPINEPHRINE 1 MG/ML
0.3 INJECTION, SOLUTION INTRAMUSCULAR; SUBCUTANEOUS EVERY 5 MIN PRN
OUTPATIENT
Start: 2024-11-12

## 2024-11-12 RX ORDER — LIDOCAINE HYDROCHLORIDE 10 MG/ML
20 INJECTION, SOLUTION EPIDURAL; INFILTRATION; INTRACAUDAL; PERINEURAL ONCE
Status: COMPLETED | OUTPATIENT
Start: 2024-11-12 | End: 2024-11-12

## 2024-11-12 RX ORDER — ALBUMIN (HUMAN) 12.5 G/50ML
12.5 SOLUTION INTRAVENOUS
OUTPATIENT
Start: 2024-11-12

## 2024-11-12 RX ORDER — HEPARIN SODIUM (PORCINE) LOCK FLUSH IV SOLN 100 UNIT/ML 100 UNIT/ML
5 SOLUTION INTRAVENOUS
OUTPATIENT
Start: 2024-11-12

## 2024-11-12 RX ORDER — ALBUTEROL SULFATE 0.83 MG/ML
2.5 SOLUTION RESPIRATORY (INHALATION)
OUTPATIENT
Start: 2024-11-12

## 2024-11-12 RX ORDER — DIPHENHYDRAMINE HYDROCHLORIDE 50 MG/ML
25 INJECTION INTRAMUSCULAR; INTRAVENOUS
Start: 2024-11-12

## 2024-11-12 RX ORDER — METHYLPREDNISOLONE SODIUM SUCCINATE 40 MG/ML
40 INJECTION INTRAMUSCULAR; INTRAVENOUS
Start: 2024-11-12

## 2024-11-12 RX ORDER — HEPARIN SODIUM,PORCINE 10 UNIT/ML
5-20 VIAL (ML) INTRAVENOUS DAILY PRN
OUTPATIENT
Start: 2024-11-12

## 2024-11-12 RX ORDER — MEPERIDINE HYDROCHLORIDE 25 MG/ML
25 INJECTION INTRAMUSCULAR; INTRAVENOUS; SUBCUTANEOUS
OUTPATIENT
Start: 2024-11-12

## 2024-11-12 RX ORDER — LIDOCAINE HYDROCHLORIDE 10 MG/ML
20 INJECTION, SOLUTION EPIDURAL; INFILTRATION; INTRACAUDAL; PERINEURAL ONCE
OUTPATIENT
Start: 2024-11-12 | End: 2024-11-12

## 2024-11-12 RX ORDER — ALBUMIN (HUMAN) 12.5 G/50ML
12.5 SOLUTION INTRAVENOUS
Status: DISCONTINUED | OUTPATIENT
Start: 2024-11-12 | End: 2024-11-12 | Stop reason: HOSPADM

## 2024-11-12 RX ORDER — ALBUTEROL SULFATE 90 UG/1
1-2 INHALANT RESPIRATORY (INHALATION)
Start: 2024-11-12

## 2024-11-12 RX ORDER — DIPHENHYDRAMINE HYDROCHLORIDE 50 MG/ML
50 INJECTION INTRAMUSCULAR; INTRAVENOUS
Start: 2024-11-12

## 2024-11-12 RX ADMIN — LIDOCAINE HYDROCHLORIDE 10 ML: 10 INJECTION, SOLUTION EPIDURAL; INFILTRATION; INTRACAUDAL; PERINEURAL at 12:56

## 2024-11-12 NOTE — PATIENT INSTRUCTIONS
Dear Blanco Osborne    Thank you for choosing [facility] for your paracentesis.  The following information is a summary of our appointment as well as important reminders.      If you have any questions on your upcoming Specialty Infusion appointments, please call scheduling at [phone].  It was a pleasure taking care of you today.    Sincerely,    [facility]  [address] [place] [address]  [id number]  Phone:  [phone]

## 2024-11-12 NOTE — PROGRESS NOTES
Paracentesis Nursing Note  Blanco Osborne presents [facility] for a [facility].    During today's appointment orders from Juan Simms MD were completed.    Progress Note:  Patient identification verified by name and date of birth.  Assessment completed.  Vitals monitored throughout appointment and recorded in Doc Flowsheets.  See proceduralist note in ultrasound.    Vascular Access: no access needed today  Labs: peritoneum fluid labs were drawn per orders.     Date of consent or authorization: 11/12/24.  Invasive Procedure Safety Checklist was completed and sent for scanning.     Paracentesis performed by MD Renner.    The following labs were communicated to provider performing paracentesis:  Lab Results   Component Value Date     11/06/2024     04/20/2020       Total amount of ascites fluid drained: 3.9 liters.  Color of ascites fluid: straw colored.  Total amount of albumin given: 0 grams.    Patient tolerated procedure well.    Post procedure,denies pain or discomfort post paracentesis.        Discharge Plan:  Discharge instructions were reviewed with patient.  Patient/Representative verbalized understanding and all questions were answered.   Dis[facility] in stable condition.    Marifer Melo RN    Administrations This Visit       lidocaine (PF) (XYLOCAINE) 1 % injection 20 mL       Admin Date  11/12/2024 Action  $Given Dose  10 mL Route  Subcutaneous Documented By  Marifer Melo RN                    /68 (Patient Position: Standing)   Pulse 81   Temp 97.7  F (36.5  C) (Oral)   Resp 18   Wt 89.9 kg (198 lb 1.6 oz)   SpO2 96%   BMI 26.87 kg/m

## 2024-11-12 NOTE — TELEPHONE ENCOUNTER
Call from Blanco who spoke to an outside surgeon about doing a hernia repair.  This surgeon advised him to have this done a that [facility].  I agreed that this is typically what we do.  Sent a request for a consult appt w/ Blanco's transplant surgeon, Dr. Crews.

## 2024-11-13 ENCOUNTER — LAB (OUTPATIENT)
Dept: LAB | Facility: CLINIC | Age: 59
End: 2024-11-13
Payer: COMMERCIAL

## 2024-11-13 DIAGNOSIS — Z76.82 AWAITING ORGAN TRANSPLANT: ICD-10-CM

## 2024-11-14 ENCOUNTER — THERAPY VISIT (OUTPATIENT)
Dept: PHYSICAL THERAPY | Facility: CLINIC | Age: 59
End: 2024-11-14
Attending: PHYSICIAN ASSISTANT
Payer: COMMERCIAL

## 2024-11-14 DIAGNOSIS — M54.6 ACUTE BILATERAL THORACIC BACK PAIN: Primary | ICD-10-CM
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
## 2024-11-14 DIAGNOSIS — M54.50 ACUTE BILATERAL LOW BACK PAIN WITHOUT SCIATICA: ICD-10-CM

## 2024-11-14 PROCEDURE — 97110 THERAPEUTIC EXERCISES: CPT | Mod: GP | Performed by: PHYSICAL THERAPIST

## 2024-11-14 PROCEDURE — 97161 PT EVAL LOW COMPLEX 20 MIN: CPT | Mod: GP | Performed by: PHYSICAL THERAPIST

## 2024-11-14 PROCEDURE — 97530 THERAPEUTIC ACTIVITIES: CPT | Mod: GP | Performed by: PHYSICAL THERAPIST

## 2024-11-15 ENCOUNTER — TELEPHONE (OUTPATIENT)
Dept: TRANSPLANT | Facility: CLINIC | Age: 59
End: 2024-11-15
Payer: COMMERCIAL

## 2024-11-15 DIAGNOSIS — R18.8 ASCITES: Primary | ICD-10-CM

## 2024-11-15 DIAGNOSIS — K76.0 NAFLD (NONALCOHOLIC FATTY LIVER DISEASE): ICD-10-CM

## 2024-11-15 NOTE — TELEPHONE ENCOUNTER
Dr. Simms requesting cytology and adenosine deaminase level to be performed on ascites fluid.   Order placed. Called lab to see if they still have enough fluid to run these additional tests.  They will call me if they cannot run.

## 2024-11-16 PROBLEM — M54.6 ACUTE BILATERAL THORACIC BACK PAIN: Status: ACTIVE | Noted: 2024-11-16

## 2024-11-16 PROBLEM — M54.50 ACUTE BILATERAL LOW BACK PAIN WITHOUT SCIATICA: Status: ACTIVE | Noted: 2024-11-16
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
## 2024-11-17 LAB
BACTERIA FLD CULT: NO GROWTH
GRAM STAIN RESULT: NORMAL
GRAM STAIN RESULT: NORMAL

## 2024-11-19 ENCOUNTER — ANTICOAGULATION THERAPY VISIT (OUTPATIENT)
Dept: ANTICOAGULATION | Facility: CLINIC | Age: 59
End: 2024-11-19

## 2024-11-19 ENCOUNTER — OFFICE VISIT (OUTPATIENT)
Dept: FAMILY MEDICINE | Facility: CLINIC | Age: 59
End: 2024-11-19
Payer: COMMERCIAL

## 2024-11-19 VITALS
HEIGHT: 72 IN | WEIGHT: 201 LBS | HEART RATE: 85 BPM | TEMPERATURE: 98.3 F | SYSTOLIC BLOOD PRESSURE: 95 MMHG | OXYGEN SATURATION: 97 % | RESPIRATION RATE: 16 BRPM | BODY MASS INDEX: 27.22 KG/M2 | DIASTOLIC BLOOD PRESSURE: 62 MMHG

## 2024-11-19 DIAGNOSIS — I63.10 CEREBROVASCULAR ACCIDENT (CVA) DUE TO EMBOLISM OF PRECEREBRAL ARTERY (H): ICD-10-CM

## 2024-11-19 DIAGNOSIS — Z76.82 ORGAN TRANSPLANT CANDIDATE: Primary | ICD-10-CM

## 2024-11-19 DIAGNOSIS — G47.00 INSOMNIA, UNSPECIFIED TYPE: ICD-10-CM

## 2024-11-19 DIAGNOSIS — Z94.4 LIVER TRANSPLANTED (H): ICD-10-CM

## 2024-11-19 DIAGNOSIS — K74.60 CIRRHOSIS OF LIVER WITH ASCITES, UNSPECIFIED HEPATIC CIRRHOSIS TYPE (H): ICD-10-CM

## 2024-11-19 DIAGNOSIS — Z79.01 ANTICOAGULATED: ICD-10-CM
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
## 2024-11-19 DIAGNOSIS — G25.81 RESTLESS LEG SYNDROME: ICD-10-CM

## 2024-11-19 DIAGNOSIS — R18.8 CIRRHOSIS OF LIVER WITH ASCITES, UNSPECIFIED HEPATIC CIRRHOSIS TYPE (H): ICD-10-CM

## 2024-11-19 DIAGNOSIS — I48.91 ATRIAL FIBRILLATION WITH RAPID VENTRICULAR RESPONSE (H): ICD-10-CM

## 2024-11-19 LAB
INR BLD: 1.3 (ref 0.9–1.1)
PATH REPORT.COMMENTS IMP SPEC: NORMAL
PATH REPORT.FINAL DX SPEC: NORMAL
PATH REPORT.GROSS SPEC: NORMAL
PATH REPORT.MICROSCOPIC SPEC OTHER STN: NORMAL
PATH REPORT.RELEVANT HX SPEC: NORMAL

## 2024-11-19 PROCEDURE — 36416 COLLJ CAPILLARY BLOOD SPEC: CPT | Performed by: PHYSICIAN ASSISTANT

## 2024-11-19 PROCEDURE — 99213 OFFICE O/P EST LOW 20 MIN: CPT | Performed by: PHYSICIAN ASSISTANT

## 2024-11-19 PROCEDURE — 85610 PROTHROMBIN TIME: CPT | Performed by: PHYSICIAN ASSISTANT

## 2024-11-19 PROCEDURE — G2211 COMPLEX E/M VISIT ADD ON: HCPCS | Performed by: PHYSICIAN ASSISTANT

## 2024-11-19 RX ORDER — OXYCODONE HYDROCHLORIDE 5 MG/1
5 TABLET ORAL EVERY 6 HOURS PRN
Qty: 20 TABLET | Refills: 0 | Status: SHIPPED | OUTPATIENT
Start: 2024-11-19

## 2024-11-19 RX ORDER — HYDROXYZINE HYDROCHLORIDE 25 MG/1
25 TABLET, FILM COATED ORAL 3 TIMES DAILY PRN
Qty: 90 TABLET | Refills: 1 | Status: SHIPPED | OUTPATIENT
Start: 2024-11-19

## 2024-11-19 ASSESSMENT — PAIN SCALES - GENERAL: PAINLEVEL_OUTOF10: SEVERE PAIN (7)
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
## 2024-12-02 ENCOUNTER — OFFICE VISIT (OUTPATIENT)
Dept: TRANSPLANT | Facility: CLINIC | Age: 59
End: 2024-12-02
Attending: TRANSPLANT SURGERY
Payer: COMMERCIAL

## 2024-12-02 ENCOUNTER — MYC MEDICAL ADVICE (OUTPATIENT)
Dept: ANTICOAGULATION | Facility: CLINIC | Age: 59
End: 2024-12-02
Payer: COMMERCIAL

## 2024-12-02 VITALS
BODY MASS INDEX: 28.03 KG/M2 | DIASTOLIC BLOOD PRESSURE: 84 MMHG | SYSTOLIC BLOOD PRESSURE: 127 MMHG | HEART RATE: 88 BPM | OXYGEN SATURATION: 95 % | TEMPERATURE: 97.5 F | WEIGHT: 206.7 LBS

## 2024-12-02 DIAGNOSIS — K43.2 INCISIONAL HERNIA, WITHOUT OBSTRUCTION OR GANGRENE: Primary | ICD-10-CM

## 2024-12-02 PROCEDURE — G0463 HOSPITAL OUTPT CLINIC VISIT: HCPCS | Performed by: TRANSPLANT SURGERY

## 2024-12-02 RX ORDER — OXYCODONE HYDROCHLORIDE 5 MG/1
5 TABLET ORAL EVERY 6 HOURS PRN
Qty: 12 TABLET | Refills: 0 | Status: SHIPPED | OUTPATIENT
Start: 2024-12-02 | End: 2024-12-05

## 2024-12-02 ASSESSMENT — PAIN SCALES - GENERAL: PAINLEVEL_OUTOF10: SEVERE PAIN (7)
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
## 2024-12-03 NOTE — PROGRESS NOTES
Patient has remained on high flow trach dome 30L 30% throughout my shift. Gauze changed x2  due to bloody secretions. Lavaged and suctioned a moderate amount of thick creamy secretions.     Patient nodded his head yes when I asked if his breathing felt okay. RR 22, coarse breath sounds.  Receiving DuoNebs QID per MD order.    MD placed orders for mucomyst and CPT.    RT will continue to follow.  Peace Jaramillo  RRT   36.7

## 2024-12-05 ENCOUNTER — THERAPY VISIT (OUTPATIENT)
Dept: PHYSICAL THERAPY | Facility: CLINIC | Age: 59
End: 2024-12-05
Payer: COMMERCIAL

## 2024-12-05 DIAGNOSIS — M54.50 ACUTE BILATERAL LOW BACK PAIN WITHOUT SCIATICA: Primary | ICD-10-CM

## 2024-12-05 DIAGNOSIS — M54.6 ACUTE BILATERAL THORACIC BACK PAIN: ICD-10-CM

## 2024-12-09 ENCOUNTER — TELEPHONE (OUTPATIENT)
Dept: ANTICOAGULATION | Facility: CLINIC | Age: 59
End: 2024-12-09
Payer: COMMERCIAL

## 2024-12-09 NOTE — TELEPHONE ENCOUNTER
ANTICOAGULATION     Blanco Osborne is overdue for an INR check.     Left message for patient to call and schedule lab appointment as soon as possible. If returning call, please schedule.     Lou Duong RN  12/9/2024  Anticoagulation Clinic  [facility] for routing messages: vashti MCGREGOR  [facility] patient phone line: [phone]

## 2024-12-11 LAB
PROTOCOL CUTOFF: NORMAL
SA 1  COMMENTS: NORMAL
SA 1 CELL: NORMAL
SA 1 TEST METHOD: NORMAL
SA 2 CELL: NORMAL
SA 2 COMMENTS: NORMAL
SA 2 TEST METHOD: NORMAL
SA1 HI RISK ABY: NORMAL
SA1 MOD RISK ABY: NORMAL
SA2 HI RISK ABY: NORMAL
SA2 MOD RISK ABY: NORMAL
UNACCEPTABLE ANTIGENS: NORMAL
UNOS CPRA: 72

## 2024-12-16 NOTE — TELEPHONE ENCOUNTER
Call from [place], asking for disability letter.  Suggested she get this from PCP or MD that has seen him recently.  Liver is doing well.    Declined follow up

## 2024-12-23 ENCOUNTER — DOCUMENTATION ONLY (OUTPATIENT)
Dept: ANTICOAGULATION | Facility: CLINIC | Age: 59
End: 2024-12-23
Payer: COMMERCIAL

## 2024-12-23 NOTE — LETTER
"     [facility]TICO[facility]ULATION [facility]IN[facility]  [address] [facility] [address]  Phone: [phone]  Fax: [phone]       December 23, 2024        Blanco Osborne  [address]  [facility] [address]            Dear Blanco,    You are currently under the care of [facility] for your warfarin (Coumadin , Jantoven ) therapy.  We are contacting you because our records show you were due for an INR on 11/29/24.    There are potentially serious risks when taking warfarin without careful monitoring and we want to make sure you are safely managed.  Routine lab monitoring is required for warfarin refills.     Please schedule a lab appointment as soon as possible either by calling [phone] or scheduling directly in [employer]. To schedule in [employer] open the \"schedule an appointment section\"  then select \"lab only\" as the reason you are coming in and \"INR\" as the lab test. If it is difficult for you to get to lab, please call us to discuss options.  If there has been a change in your care or other concerns, please let us know so we can help and/or update our records.         Sincerely,       [facility]   "

## 2024-12-23 NOTE — PROGRESS NOTES
ANTICOAGULATION     Blanco Osborne is overdue for an INR check.     Reminder letter sent    Valeria Estrada RN  12/23/2024  Anticoagulation Clinic  [facility] for routing messages: vashti MCGREGOR  [facility] patient phone line: [phone]   Hospital Medicine Discharge Summary    Patient: Gearldine Opitz     Gender: male  : 1970   Age: 45 y.o. MRN: [id number]    Admitting Physician: Virgil Downing MD  Discharge Physician: Virgil Downing MD     Code Status: Full Code     Admit Date: 10/8/2021   Discharge Date:   10/8/21    Disposition:  Home    Discharge Diagnoses: Active Hospital Problems    Diagnosis Date Noted    Chest pain [R07.9] 10/08/2021    Sinus bradycardia [R00.1] 10/08/2021       Follow-up appointments:  one week    Outpatient to do list: F/U with PCP. PCP should consider CT Chest or mammography for L chest lesion by L nipple    Condition at Discharge:  Stable    Hospital Course:   45 y.o. male with no PMH who came to ER with complaints of L sided CP. Placed in observation. D-Dimer < 200, doubt PE. Serial troponin negative. SPECT negative for reversible ischemia. Exam is notable for L chest mobile, nodular lesion at 10 o'clock from L nipple. PCP can consider CT Chest or mammogram if present on follow up in office. PCP should f/u HgA1C and lipid panel from this stay on follow up in office. Discharge Medications: There are no discharge medications for this patient. There are no discharge medications for this patient. There are no discharge medications for this patient. There are no discharge medications for this patient. Discharge Exam:    /67   Pulse 58   Temp 97.9 °F (36.6 °C) (Oral)   Resp 16   Ht 5' 8\" (1.727 m)   Wt 190 lb (86.2 kg)   SpO2 98%   BMI 28.89 kg/m²   General appearance:  Appears comfortable. Well nourished  Eyes: Sclera clear, pupils equal  ENT: Moist mucus membranes, no thrush. Trachea midline. Cardiovascular: Regular rhythm, normal S1, S2. No murmur, gallop, rub.  No edema in lower extremities  Respiratory: Clear to auscultation bilaterally, no wheeze, good inspiratory effort  Gastrointestinal: Abdomen soft, non-tender, not distended, normal bowel sounds  Musculoskeletal: No cyanosis in digits, neck supple  Neurology: Cranial nerves grossly intact. Alert and oriented in time, place and person. No speech or motor deficits  Psychiatry: Appropriate affect. Not agitated  Skin: Warm, dry, normal turgor, no rash  Brisk capillary refill, peripheral pulses palpable     Labs: For convenience and continuity at follow-up the following most recent labs are provided:    Lab Results   Component Value Date    WBC 4.1 10/08/2021    HGB 14.1 10/08/2021    HCT 41.5 10/08/2021    MCV 93.7 10/08/2021     10/08/2021     10/08/2021    K 3.9 10/08/2021     10/08/2021    CO2 25 10/08/2021    BUN 9 10/08/2021    CREATININE 1.2 10/08/2021    CALCIUM 8.8 10/08/2021    ALKPHOS 81 10/08/2021    ALT 31 10/08/2021    AST 21 10/08/2021    BILITOT 0.7 10/08/2021    LABALBU 4.1 10/08/2021     No results found for: INR    Radiology:  XR CHEST PORTABLE    Result Date: 10/8/2021  EXAMINATION: ONE XRAY VIEW OF THE CHEST 10/8/2021 8:03 am COMPARISON: None. HISTORY: ORDERING SYSTEM PROVIDED HISTORY: cp TECHNOLOGIST PROVIDED HISTORY: Reason for exam:->cp Reason for Exam: Chest Pain (pt with c/o chest pain states started last week, radiates into left arm, describes as stabbing intermittent in nature. no cardiac hx; also with c/o tingling in left foot for a couple days. no sob, no cough ) Acuity: Acute Type of Exam: Initial FINDINGS: The heart and pulmonary vascularity are within normal limits. There are no focal areas of consolidation or pleural effusion. The osseous structures are intact.      Unremarkable portable exam     NM MYOCARDIAL SPECT REST EXERCISE OR RX    Result Date: 10/8/2021  Cardiac Perfusion Imaging  Demographics   Patient Name      [address]   Date of Study     10/08/2021          Gender              Male   Patient Number    [id number]          Date of Birth       1970   Visit Number      [id number]           Age                 45 year(s)   Accession Number  [id number]          Room Number         0009   Corporate ID      [id number]            NM Technician       Baldomero Alok, RT   Nurse             Maria Luisa Jackson, RICARDO  Interpreting        Rahul Ferris,                                        Physician           MD, Edilberto Tuttle MD  Physician   The procedure was explained in detail to the patient. Risks,  complications and alternative treatments were reviewed. Written consent  was obtained. Procedure Procedure Type:   Nuclear Stress Test:Exercise, NM MYOCARDIAL SPECT REST EXERCISE OR RX   Study location: [facility] - Nuclear Medicine   Indications: Chest pain. Hospital Status: Inpatient. Height: 68 inches Weight: 190 pounds  Conclusions   Summary  Normal myocardial perfusion. Normal LV size and systolic function. Stress Protocols   Resting ECG  Normal sinus rhythm. Resting HR:45 bpm                Resting BP:100/60 mmHg   Pre-stress physical exam: Chest pain at level 3 (on a scale of 0-10). Stress Protocol:Exercise - Yannick  Peak HR:142 bpm                                  HR/BP product:19880  Peak BP:140/57 mmHg                              Max exercise: 13 METS  Predicted HR: 168 bpm  % of predicted HR: 85  Test duration:11 min  Reason for termination:Physiologic Maximum   ECG Findings  Normal response to exercise stress . Symptoms  No symptoms with exercise. Chest pain did not change or increase with  exercise. Complications  Procedure complication was none. Imaging Protocols   - One Day   Rest                          Stress   Isotope:Myoview/Tetrofosmin   Isotope: Myoview/Tetrofosmin  Isotope dose:10.4 mCi         Isotope dose:31.7 mCi  Administration Route:I.V.      Administration Route:I.V.  Date:10/08/2021 13:35         Date:10/08/2021 14:50                                 Technique:      Gated  Imaging Results    Stress ejection    Ejection fraction:60 %    EDV :116 ml    ESV :46 ml    Stroke volume :70 ml    LV mass :152 gr  Medical History   Additional Medical History   Past Medical History: has no past medical history on file. Past Surgical History: has a past surgical history that includes  shoulder surgery (Left). Signatures   ------------------------------------------------------------------  Electronically signed by Edgar Carvajal MD, [facility] (Interpreting  physician) on 10/08/2021 at 15:45  ------------------------------------------------------------------        The patient was seen and examined on day of discharge and this discharge summary is in conjunction with any daily progress note from day of discharge. Time Spent on discharge is 45 minutes  in the examination, evaluation, counseling and review of medications and discharge plan. Note that more than 30 minutes was spent in preparing discharge papers, discussing discharge with patient, medication review, etc.       Signed:    Shweta Kapadia MD   10/8/2021      Thank you Richar Fontenot MD, MD for the opportunity to be involved in this patient's care.  If you have any questions or concerns please feel free to contact me at [address] [place] [address]

## 2024-12-31 ENCOUNTER — OFFICE VISIT (OUTPATIENT)
Dept: GASTROENTEROLOGY | Facility: CLINIC | Age: 59
End: 2024-12-31
Attending: INTERNAL MEDICINE
Payer: COMMERCIAL

## 2024-12-31 VITALS
DIASTOLIC BLOOD PRESSURE: 80 MMHG | OXYGEN SATURATION: 97 % | SYSTOLIC BLOOD PRESSURE: 119 MMHG | HEIGHT: 72 IN | TEMPERATURE: 97.8 F | WEIGHT: 206.5 LBS | HEART RATE: 73 BPM | BODY MASS INDEX: 27.97 KG/M2 | RESPIRATION RATE: 18 BRPM
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
## 2024-12-31 DIAGNOSIS — Z94.4 LIVER REPLACED BY TRANSPLANT (H): Primary | ICD-10-CM

## 2024-12-31 PROCEDURE — 99214 OFFICE O/P EST MOD 30 MIN: CPT | Performed by: INTERNAL MEDICINE

## 2024-12-31 PROCEDURE — G0463 HOSPITAL OUTPT CLINIC VISIT: HCPCS | Performed by: INTERNAL MEDICINE

## 2024-12-31 RX ORDER — OXYCODONE HYDROCHLORIDE 5 MG/1
5 TABLET ORAL EVERY 6 HOURS PRN
Qty: 20 TABLET | Refills: 0 | Status: SHIPPED | OUTPATIENT
Start: 2024-12-31

## 2024-12-31 ASSESSMENT — PAIN SCALES - GENERAL: PAINLEVEL_OUTOF10: MODERATE PAIN (5)

## 2024-12-31 NOTE — NURSING NOTE
"Chief Complaint   Patient presents with    RECHECK     post liver transplant 9/20/2016     Vital signs:  Temp: 97.8  F (36.6  C) Temp src: Oral BP: 119/80 Pulse: 73   Resp: 18 SpO2: 97 %     Height: 182.9 cm (6' 0.01\") Weight: 93.7 kg (206 lb 8 oz)  Estimated body mass index is 28 kg/m  as calculated from the following:    Height as of this encounter: 1.829 m (6' 0.01\").    Weight as of this encounter: 93.7 kg (206 lb 8 oz).      Ashleigh Mckenna, [facility]  12/31/2024 10:17 AM    "
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
## 2025-01-02 DIAGNOSIS — Z99.2 ESRD (END STAGE RENAL DISEASE) ON DIALYSIS (H): ICD-10-CM

## 2025-01-02 DIAGNOSIS — G25.81 RESTLESS LEG SYNDROME: ICD-10-CM

## 2025-01-02 DIAGNOSIS — N18.6 ESRD (END STAGE RENAL DISEASE) ON DIALYSIS (H): ICD-10-CM

## 2025-01-02 RX ORDER — B COMPLEX, C NO.20/FOLIC ACID 1 MG
1 CAPSULE ORAL DAILY
Qty: 90 CAPSULE | Refills: 3 | Status: SHIPPED | OUTPATIENT
Start: 2025-01-02

## 2025-01-06 ENCOUNTER — LAB (OUTPATIENT)
Dept: LAB | Facility: CLINIC | Age: 60
End: 2025-01-06
Payer: COMMERCIAL

## 2025-01-06 ENCOUNTER — TELEPHONE (OUTPATIENT)
Dept: ANTICOAGULATION | Facility: CLINIC | Age: 60
End: 2025-01-06
Payer: COMMERCIAL

## 2025-01-06 DIAGNOSIS — Z76.82 AWAITING ORGAN TRANSPLANT: ICD-10-CM

## 2025-01-06 NOTE — TELEPHONE ENCOUNTER
Anticoagulation Clinic Notification    Blanco, is past due for an INR. Their last result was 1.3 on 11/19/24 and was due to come back on 11/29/24.    he received phone calls and letters over the last several weeks in attempt to arrange follow up labs. Blanco Osborne will be contacted again today.     Please contact patient directly to discuss compliance with monitoring or schedule visit to review ongoing anticoagulation therapy.    Thank you,     [facility] Anticoagulation Clinic

## 2025-01-06 NOTE — TELEPHONE ENCOUNTER
ANTICOAGULATION     Blanco Osborne is overdue for an INR check.     Left message for patient to call and schedule lab appointment as soon as possible. If returning call, please schedule.     Mona Kumar RN  1/6/2025  Anticoagulation Clinic  [facility] for routing messages: vashti MCGREGOR  [facility] patient phone line: [phone]

## 2025-01-06 NOTE — LETTER
"     [facility] ANTICOAGULATION CLINIC  [address] [facility] [address]  Phone: [phone]  Fax: [phone]     January 6, 2025        Blanco Osborne  [address]  [facility] [address]            Dear Blanco,    You are currently under the care of [facility] Anticoagulation [facility] for your warfarin (Coumadin , Jantoven ) therapy.  We are contacting you because our records show you were due for an INR on 11/29/24.    There are potentially serious risks when taking warfarin without careful monitoring and we want to make sure you are safely managed.  Routine lab monitoring is required for warfarin refills.     Please schedule a lab appointment as soon as possible either by calling [phone] or scheduling directly in [facility]. To schedule in [facility] open the \"schedule an appointment section\"  then select \"lab only\" as the reason you are coming in and \"INR\" as the lab test. If it is difficult for you to get to lab, please call us to discuss options.  If there has been a change in your care or other concerns, please let us know so we can help and/or update our records.         Sincerely,       [facility] Anticoagulation [facility]    "

## 2025-01-08 DIAGNOSIS — I10 HYPERTENSION, UNSPECIFIED TYPE: ICD-10-CM

## 2025-01-08 RX ORDER — MIDODRINE HYDROCHLORIDE 10 MG/1
TABLET ORAL
Qty: 450 TABLET | Refills: 1 | Status: SHIPPED | OUTPATIENT
Start: 2025-01-08

## 2025-01-10 DIAGNOSIS — G25.81 RESTLESS LEG SYNDROME: Primary | ICD-10-CM

## 2025-01-10 DIAGNOSIS — G47.00 INSOMNIA, UNSPECIFIED TYPE: ICD-10-CM

## 2025-01-10 NOTE — TELEPHONE ENCOUNTER
Clinic RN: Please investigate patient's chart or contact patient if the information cannot be found because the medication is listed as historical or discontinued. Confirm patient is taking this medication. Document findings and route refill encounter to provider for approval or denial. Other request has patient taking differently and needs provider review.    Tushar Romero, RN, BSN, MSN  RN Lead

## 2025-01-13 RX ORDER — TRAZODONE HYDROCHLORIDE 50 MG/1
50 TABLET, FILM COATED ORAL
Qty: 90 TABLET | Refills: 3 | Status: SHIPPED | OUTPATIENT
Start: 2025-01-13

## 2025-01-13 RX ORDER — ROPINIROLE 0.5 MG/1
0.5 TABLET, FILM COATED ORAL 2 TIMES DAILY
Qty: 180 TABLET | Refills: 1 | Status: SHIPPED | OUTPATIENT
Start: 2025-01-13

## 2025-02-03 ENCOUNTER — TELEPHONE (OUTPATIENT)
Dept: ANTICOAGULATION | Facility: CLINIC | Age: 60
End: 2025-02-03
Payer: COMMERCIAL

## 2025-02-03 NOTE — TELEPHONE ENCOUNTER
ANTICOAGULATION MANAGEMENT PROGRAM    Dr. Carter,     Our records indicate that Blanco Osborne remains past due to check an INR. Blanco Osbrone was contacted multiple times over at least the last 8 weeks to attempt to arrange a follow up appointment.    Blanco Osborne last had an an INR checked on 11/19/24 and was due for follow up on 11/29/24     Last [facility] referral date: 09/26/2024    Called patient, Spoke with Blanco and scheduled lab appointment on 2/6/25 (back-up option 2/14/25 with ov)     At this time Blanco Osborne will be moved to noncompliant status within the program until their referral expires on 9/26/25. While in noncompliant status the patient would continue to be contacted periodically (~every 6 weeks) by the anticoagulation program to attempt to schedule patient. You will be notified of each contact attempt to make you aware of patient's ongoing noncompliance.         Thank you,     [facility] Anticoagulation Management Program

## 2025-02-06 ENCOUNTER — LAB (OUTPATIENT)
Dept: LAB | Facility: CLINIC | Age: 60
End: 2025-02-06
Payer: COMMERCIAL

## 2025-02-06 DIAGNOSIS — I48.91 ATRIAL FIBRILLATION WITH RAPID VENTRICULAR RESPONSE (H): ICD-10-CM

## 2025-02-06 DIAGNOSIS — I10 HYPERTENSION: Primary | ICD-10-CM

## 2025-02-06 DIAGNOSIS — Z79.01 ANTICOAGULATED: ICD-10-CM

## 2025-02-06 LAB — INR BLD: 1.2 (ref 0.9–1.1)

## 2025-02-14 ENCOUNTER — OFFICE VISIT (OUTPATIENT)
Dept: FAMILY MEDICINE | Facility: CLINIC | Age: 60
End: 2025-02-14
Payer: COMMERCIAL

## 2025-02-14 VITALS
BODY MASS INDEX: 28.63 KG/M2 | TEMPERATURE: 97.5 F | SYSTOLIC BLOOD PRESSURE: 110 MMHG | OXYGEN SATURATION: 96 % | RESPIRATION RATE: 20 BRPM | HEART RATE: 76 BPM | HEIGHT: 71 IN | DIASTOLIC BLOOD PRESSURE: 75 MMHG | WEIGHT: 204.5 LBS

## 2025-02-14 DIAGNOSIS — G47.00 INSOMNIA, UNSPECIFIED TYPE: ICD-10-CM

## 2025-02-14 DIAGNOSIS — Z79.01 LONG TERM CURRENT USE OF ANTICOAGULANT THERAPY: ICD-10-CM

## 2025-02-14 DIAGNOSIS — Z94.4 LIVER TRANSPLANTED (H): Primary | ICD-10-CM

## 2025-02-14 DIAGNOSIS — H61.23 BILATERAL IMPACTED CERUMEN: ICD-10-CM
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
## 2025-02-14 DIAGNOSIS — Z23 NEED FOR VACCINATION: ICD-10-CM

## 2025-02-14 DIAGNOSIS — H93.8X3 EAR FULLNESS, BILATERAL: ICD-10-CM

## 2025-02-14 DIAGNOSIS — Z29.11 NEED FOR VACCINATION AGAINST RESPIRATORY SYNCYTIAL VIRUS: ICD-10-CM

## 2025-02-14 DIAGNOSIS — I48.91 ATRIAL FIBRILLATION WITH RAPID VENTRICULAR RESPONSE (H): ICD-10-CM

## 2025-02-14 DIAGNOSIS — G25.81 RESTLESS LEG SYNDROME: ICD-10-CM

## 2025-02-14 DIAGNOSIS — N18.6 ESRD (END STAGE RENAL DISEASE) ON DIALYSIS (H): ICD-10-CM

## 2025-02-14 DIAGNOSIS — Z99.2 ESRD (END STAGE RENAL DISEASE) ON DIALYSIS (H): ICD-10-CM

## 2025-02-14 DIAGNOSIS — Z86.73 HISTORY OF CVA (CEREBROVASCULAR ACCIDENT): ICD-10-CM

## 2025-02-14 PROCEDURE — 99214 OFFICE O/P EST MOD 30 MIN: CPT | Mod: 25 | Performed by: PHYSICIAN ASSISTANT

## 2025-02-14 PROCEDURE — 69209 REMOVE IMPACTED EAR WAX UNI: CPT | Mod: 50 | Performed by: PHYSICIAN ASSISTANT

## 2025-02-14 PROCEDURE — 90677 PCV20 VACCINE IM: CPT | Performed by: PHYSICIAN ASSISTANT

## 2025-02-14 PROCEDURE — G0009 ADMIN PNEUMOCOCCAL VACCINE: HCPCS | Performed by: PHYSICIAN ASSISTANT

## 2025-02-14 RX ORDER — OXYCODONE HYDROCHLORIDE 5 MG/1
5 TABLET ORAL EVERY 6 HOURS PRN
Qty: 20 TABLET | Refills: 0 | Status: SHIPPED | OUTPATIENT
Start: 2025-02-14

## 2025-02-14 RX ORDER — HYDROXYZINE HYDROCHLORIDE 25 MG/1
25 TABLET, FILM COATED ORAL 3 TIMES DAILY PRN
Qty: 90 TABLET | Refills: 3 | Status: SHIPPED | OUTPATIENT
Start: 2025-02-14

## 2025-02-14 ASSESSMENT — PAIN SCALES - GENERAL: PAINLEVEL_OUTOF10: MODERATE PAIN (5)

## 2025-02-14 NOTE — PATIENT INSTRUCTIONS
-Flonase (fluticasone/steroid spray) once daily to both nostrils  Pneumonia shot today  RSV shot pharmacy
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
## 2025-02-14 NOTE — NURSING NOTE
Patient identified using two patient identifiers.  Ear exam showing wax occlusion completed by provider.  Solution: warm water was placed in the both ear(s) via irrigation tool: elephant ear.     Andrei Driscoll CMA on 2/14/2025 at 4:01 PM

## 2025-02-24 DIAGNOSIS — L29.9 ITCHING: ICD-10-CM

## 2025-02-24 RX ORDER — GABAPENTIN 100 MG/1
CAPSULE ORAL
Qty: 90 CAPSULE | Refills: 3 | Status: SHIPPED | OUTPATIENT
Start: 2025-02-24

## 2025-03-17 ENCOUNTER — TELEPHONE (OUTPATIENT)
Dept: TRANSPLANT | Facility: CLINIC | Age: 60
End: 2025-03-17

## 2025-03-17 DIAGNOSIS — K76.0 NAFLD (NONALCOHOLIC FATTY LIVER DISEASE): ICD-10-CM

## 2025-03-17 NOTE — TELEPHONE ENCOUNTER
"Call from Ofe.   Blanco is requesting an order for paracentesis.  He is going to [facility] for a kidney transplant evaluation mid April 22,23,24. Is active on our list.  He has had some kidney donors but all have been deemed not a candidates.   He went to [place] for 3 weeks, ate out a lot, has hernias which seem to have been aggravated by golfing. Admits to eating \"a lot of salt\".   Came home, was nauseated, couldn't keep food down.  She is wondering if he has a hiatal hernia.  Is better today.    He does dialysis Monday, Wednesday and Friday at [facility] in [facility], he is  there now.  He does labs at [facility] , has orders but he doesn't seem to go in regularly.  I asked Ofe to remind him to go in every 3-4 mos. She says he's been having \"dehydration migraines\".   Placed therapy plan for para, drain to dry but also stressed the need for Blanco to take in less salt to help to manage fluid.     "

## 2025-03-18 ENCOUNTER — LAB (OUTPATIENT)
Dept: LAB | Facility: CLINIC | Age: 60
End: 2025-03-18
Payer: COMMERCIAL

## 2025-03-18 DIAGNOSIS — I10 HYPERTENSION: ICD-10-CM

## 2025-03-18 DIAGNOSIS — Z94.4 LIVER REPLACED BY TRANSPLANT (H): ICD-10-CM

## 2025-03-18 DIAGNOSIS — K76.0 NAFLD (NONALCOHOLIC FATTY LIVER DISEASE): Primary | ICD-10-CM

## 2025-03-18 DIAGNOSIS — Z94.4 LIVER TRANSPLANTED (H): ICD-10-CM

## 2025-03-18 LAB
ERYTHROCYTE [DISTWIDTH] IN BLOOD BY AUTOMATED COUNT: 16.2 % (ref 10–15)
HCT VFR BLD AUTO: 40.8 % (ref 40–53)
HGB BLD-MCNC: 12.9 G/DL (ref 13.3–17.7)
MCH RBC QN AUTO: 33.7 PG (ref 26.5–33)
MCHC RBC AUTO-ENTMCNC: 31.6 G/DL (ref 31.5–36.5)
MCV RBC AUTO: 107 FL (ref 78–100)
PLATELET # BLD AUTO: 91 10E3/UL (ref 150–450)
RBC # BLD AUTO: 3.83 10E6/UL (ref 4.4–5.9)
TACROLIMUS BLD-MCNC: 12.6 UG/L (ref 5–15)
TME LAST DOSE: NORMAL H
TME LAST DOSE: NORMAL H
WBC # BLD AUTO: 4.1 10E3/UL (ref 4–11)

## 2025-03-19 ENCOUNTER — TELEPHONE (OUTPATIENT)
Dept: TRANSPLANT | Facility: CLINIC | Age: 60
End: 2025-03-19
Payer: COMMERCIAL

## 2025-03-19 DIAGNOSIS — K76.0 NAFLD (NONALCOHOLIC FATTY LIVER DISEASE): ICD-10-CM

## 2025-03-19 DIAGNOSIS — Z94.4 LIVER TRANSPLANTED (H): Primary | ICD-10-CM

## 2025-03-19 LAB
ALBUMIN SERPL BCG-MCNC: 4.2 G/DL (ref 3.5–5.2)
ALP SERPL-CCNC: 214 U/L (ref 40–150)
ALT SERPL W P-5'-P-CCNC: 15 U/L (ref 0–70)
ANION GAP SERPL CALCULATED.3IONS-SCNC: 15 MMOL/L (ref 7–15)
AST SERPL W P-5'-P-CCNC: 21 U/L (ref 0–45)
BILIRUB DIRECT SERPL-MCNC: 0.41 MG/DL (ref 0–0.3)
BILIRUB SERPL-MCNC: 0.7 MG/DL
BUN SERPL-MCNC: 24.4 MG/DL (ref 8–23)
CALCIUM SERPL-MCNC: 9.7 MG/DL (ref 8.8–10.4)
CHLORIDE SERPL-SCNC: 93 MMOL/L (ref 98–107)
CHOLEST SERPL-MCNC: 136 MG/DL
CREAT SERPL-MCNC: 6.42 MG/DL (ref 0.67–1.17)
EGFRCR SERPLBLD CKD-EPI 2021: 9 ML/MIN/1.73M2
FASTING STATUS PATIENT QL REPORTED: NO
FASTING STATUS PATIENT QL REPORTED: NO
GLUCOSE SERPL-MCNC: 111 MG/DL (ref 70–99)
HCO3 SERPL-SCNC: 27 MMOL/L (ref 22–29)
HDLC SERPL-MCNC: 51 MG/DL
LDLC SERPL CALC-MCNC: 63 MG/DL
NONHDLC SERPL-MCNC: 85 MG/DL
POTASSIUM SERPL-SCNC: 4.2 MMOL/L (ref 3.4–5.3)
PROT SERPL-MCNC: 8 G/DL (ref 6.4–8.3)
SODIUM SERPL-SCNC: 135 MMOL/L (ref 135–145)
TRIGL SERPL-MCNC: 109 MG/DL

## 2025-03-19 NOTE — TELEPHONE ENCOUNTER
Tac level 3/18 incorrect due to timing of lab draw.  I had explained the importance of a 12 hour tac level to Ofe on Monday.     Message to Blanco and Ofe via result note laying out timing of tac level.  Blanco only rarely does labs, contacts the office when he becomes ill or needs immediate intervention.  I asked them to get back on track in terms of doing labs at appropriate and regular intervals on non dialysis days.      Lisbet, please call Blanco and explain importance of regular appropriately timed tac levels and labs.     Blanco is getting a paracentesis today after a trip to [place], eating out a lot, knowingly taking in a lot of salty foods.  I explained to Ofe why he needs to be more careful with his diet.

## 2025-03-20 ENCOUNTER — TELEPHONE (OUTPATIENT)
Dept: TRANSPLANT | Facility: CLINIC | Age: 60
End: 2025-03-20

## 2025-03-20 ENCOUNTER — ANCILLARY PROCEDURE (OUTPATIENT)
Dept: ULTRASOUND IMAGING | Facility: CLINIC | Age: 60
End: 2025-03-20
Attending: INTERNAL MEDICINE
Payer: COMMERCIAL

## 2025-03-20 ENCOUNTER — OFFICE VISIT (OUTPATIENT)
Dept: INFUSION THERAPY | Facility: CLINIC | Age: 60
End: 2025-03-20
Attending: INTERNAL MEDICINE
Payer: COMMERCIAL

## 2025-03-20 VITALS
OXYGEN SATURATION: 97 % | BODY MASS INDEX: 26.95 KG/M2 | WEIGHT: 195.5 LBS | SYSTOLIC BLOOD PRESSURE: 99 MMHG | HEART RATE: 72 BPM | RESPIRATION RATE: 16 BRPM | DIASTOLIC BLOOD PRESSURE: 65 MMHG

## 2025-03-20 DIAGNOSIS — Z94.4 LIVER TRANSPLANTED (H): Primary | ICD-10-CM

## 2025-03-20 DIAGNOSIS — K76.0 NAFLD (NONALCOHOLIC FATTY LIVER DISEASE): ICD-10-CM

## 2025-03-20 DIAGNOSIS — R18.8 OTHER ASCITES: ICD-10-CM

## 2025-03-20 PROCEDURE — 250N000009 HC RX 250: Performed by: INTERNAL MEDICINE

## 2025-03-20 PROCEDURE — 49083 ABD PARACENTESIS W/IMAGING: CPT | Performed by: RADIOLOGY

## 2025-03-20 PROCEDURE — 96372 THER/PROPH/DIAG INJ SC/IM: CPT | Performed by: INTERNAL MEDICINE

## 2025-03-20 PROCEDURE — 49083 ABD PARACENTESIS W/IMAGING: CPT

## 2025-03-20 RX ORDER — LIDOCAINE HYDROCHLORIDE 10 MG/ML
20 INJECTION, SOLUTION EPIDURAL; INFILTRATION; INTRACAUDAL; PERINEURAL ONCE
OUTPATIENT
Start: 2025-03-20 | End: 2025-03-20

## 2025-03-20 RX ORDER — HEPARIN SODIUM (PORCINE) LOCK FLUSH IV SOLN 100 UNIT/ML 100 UNIT/ML
5 SOLUTION INTRAVENOUS
OUTPATIENT
Start: 2025-03-20

## 2025-03-20 RX ORDER — DIPHENHYDRAMINE HYDROCHLORIDE 50 MG/ML
25 INJECTION, SOLUTION INTRAMUSCULAR; INTRAVENOUS
Start: 2025-03-20

## 2025-03-20 RX ORDER — ALBUTEROL SULFATE 0.83 MG/ML
2.5 SOLUTION RESPIRATORY (INHALATION)
OUTPATIENT
Start: 2025-03-20

## 2025-03-20 RX ORDER — MEPERIDINE HYDROCHLORIDE 25 MG/ML
25 INJECTION INTRAMUSCULAR; INTRAVENOUS; SUBCUTANEOUS
OUTPATIENT
Start: 2025-03-20

## 2025-03-20 RX ORDER — HEPARIN SODIUM,PORCINE 10 UNIT/ML
5-20 VIAL (ML) INTRAVENOUS DAILY PRN
OUTPATIENT
Start: 2025-03-20

## 2025-03-20 RX ORDER — ALBUTEROL SULFATE 90 UG/1
1-2 INHALANT RESPIRATORY (INHALATION)
Start: 2025-03-20

## 2025-03-20 RX ORDER — ALBUMIN (HUMAN) 12.5 G/50ML
12.5 SOLUTION INTRAVENOUS
Status: DISCONTINUED | OUTPATIENT
Start: 2025-03-20 | End: 2025-03-20 | Stop reason: HOSPADM

## 2025-03-20 RX ORDER — LIDOCAINE HYDROCHLORIDE 10 MG/ML
20 INJECTION, SOLUTION EPIDURAL; INFILTRATION; INTRACAUDAL; PERINEURAL ONCE
Status: COMPLETED | OUTPATIENT
Start: 2025-03-20 | End: 2025-03-20

## 2025-03-20 RX ORDER — EPINEPHRINE 1 MG/ML
0.3 INJECTION, SOLUTION INTRAMUSCULAR; SUBCUTANEOUS EVERY 5 MIN PRN
OUTPATIENT
Start: 2025-03-20

## 2025-03-20 RX ORDER — METHYLPREDNISOLONE SODIUM SUCCINATE 40 MG/ML
40 INJECTION INTRAMUSCULAR; INTRAVENOUS
Start: 2025-03-20

## 2025-03-20 RX ORDER — ALBUMIN (HUMAN) 12.5 G/50ML
12.5 SOLUTION INTRAVENOUS
OUTPATIENT
Start: 2025-03-20

## 2025-03-20 RX ORDER — DIPHENHYDRAMINE HYDROCHLORIDE 50 MG/ML
50 INJECTION, SOLUTION INTRAMUSCULAR; INTRAVENOUS
Start: 2025-03-20

## 2025-03-20 RX ADMIN — LIDOCAINE HYDROCHLORIDE 10 ML: 10 INJECTION, SOLUTION EPIDURAL; INFILTRATION; INTRACAUDAL; PERINEURAL at 14:00

## 2025-03-20 ASSESSMENT — PAIN SCALES - GENERAL: PAINLEVEL_OUTOF10: MODERATE PAIN (4)

## 2025-03-20 NOTE — TELEPHONE ENCOUNTER
Pt called as he was confused with the messages that were left. Discussed getting an accurate tacro level and having them regularly done. Asked pt to get labs done on non-dialysis days.

## 2025-03-20 NOTE — LETTER
3/20/2025      Blanco Osborne  [address]  [facility] [address]      Dear Colleague,    Thank you for referring your patient, Blanco Osborne, to the [facility] TREATMENT [facility]. Please see a copy of my visit note below.    Paracentesis Nursing Note  Blanco Osborne presents today to [facility] for   Chief Complaint   Patient presents with     Procedure     Paracentesis     During today's appointment orders from Juan Simms MD were completed.    Progress Note:  Patient identification verified by name and date of birth.  Assessment completed.  Vitals monitored throughout appointment and recorded in Doc Flowsheets.  See proceduralist note in ultrasound.    Vascular Access: N/A  Labs: N/A    Date of consent or authorization: 3/20/25.  Invasive Procedure Safety Checklist was completed and sent for scanning.     Paracentesis performed by Dwain Renner MD.    The following labs were communicated to provider performing paracentesis:  Lab Results   Component Value Date    PLT 91 03/18/2025     Total amount of ascites fluid drained: 2.1 liters.  Color of ascites fluid: dark yellow.  Total amount of albumin given: 0 grams; did not meet parameters.    Patient tolerated procedure well.    Post procedure,denies pain or discomfort post paracentesis.    Discharge Plan:  Discharge instructions were reviewed with patient.  Patient/Representative verbalized understanding and all questions were answered.   Discharged from [facility] in stable condition.    Patient will follow up with care team.      Alena Brenner RN    /71 (Patient Position: Semi-Salgado's)   Pulse 80   Resp 16   Wt 90.7 kg (199 lb 14.4 oz)   SpO2 97%   BMI 27.56 kg/m      Administrations This Visit       lidocaine (PF) (XYLOCAINE) 1 % injection 20 mL       Admin Date  03/20/2025 Action  $Given by Other Clinician Dose  10 mL Route  Subcutaneous Documented  By  Alena Brenner RN                Again, thank you for allowing me to participate in the care of your patient.        Sincerely,        [facility] Nurse    Electronically signed

## 2025-03-20 NOTE — PROGRESS NOTES
Paracentesis Nursing Note  Blanco Osborne presents today to [facility] for   Chief Complaint   Patient presents with    Procedure     Paracentesis     During today's appointment orders from Juan Simms MD were completed.    Progress Note:  Patient identification verified by name and date of birth.  Assessment completed.  Vitals monitored throughout appointment and recorded in Doc Flowsheets.  See proceduralist note in ultrasound.    Vascular Access: N/A  Labs: N/A    Date of consent or authorization: 3/20/25.  Invasive Procedure Safety Checklist was completed and sent for scanning.     Paracentesis performed by Dwain Renner MD.    The following labs were communicated to provider performing paracentesis:  Lab Results   Component Value Date    PLT 91 03/18/2025     Total amount of ascites fluid drained: 2.1 liters.  Color of ascites fluid: dark yellow.  Total amount of albumin given: 0 grams; did not meet parameters.    Patient tolerated procedure well.    Post procedure,denies pain or discomfort post paracentesis.    Discharge Plan:  Discharge instructions were reviewed with patient.  Patient/Representative verbalized understanding and all questions were answered.   Discharged from [facility] in stable condition.    Patient will follow up with care team.      Alena Brenner RN    /71 (Patient Position: Semi-Salgado's)   Pulse 80   Resp 16   Wt 90.7 kg (199 lb 14.4 oz)   SpO2 97%   BMI 27.56 kg/m      Administrations This Visit       lidocaine (PF) (XYLOCAINE) 1 % injection 20 mL       Admin Date  03/20/2025 Action  $Given by Other Clinician Dose  10 mL Route  Subcutaneous Documented By  Alena Brenner RN

## 2025-03-22 ENCOUNTER — HEALTH MAINTENANCE LETTER (OUTPATIENT)
Age: 60
End: 2025-03-22

## 2025-03-25 DIAGNOSIS — Z76.82 ORGAN TRANSPLANT CANDIDATE: ICD-10-CM

## 2025-03-25 DIAGNOSIS — J90 PLEURAL EFFUSION: ICD-10-CM

## 2025-03-25 DIAGNOSIS — I63.10 CEREBROVASCULAR ACCIDENT (CVA) DUE TO EMBOLISM OF PRECEREBRAL ARTERY (H): ICD-10-CM

## 2025-03-25 DIAGNOSIS — Z79.01 ANTICOAGULATED: ICD-10-CM

## 2025-03-25 RX ORDER — WARFARIN SODIUM 2 MG/1
TABLET ORAL
Qty: 300 TABLET | Refills: 1 | Status: SHIPPED | OUTPATIENT
Start: 2025-03-25

## 2025-03-25 NOTE — TELEPHONE ENCOUNTER
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 Rx instructions and quantity supplied updated to match patient's current dosing plan. Warfarin 90 day supply with 1 refill granted per [facility] protocol     Scott Monahan RN  Anticoagulation Clinic

## 2025-03-27 ENCOUNTER — TELEPHONE (OUTPATIENT)
Dept: ANTICOAGULATION | Facility: CLINIC | Age: 60
End: 2025-03-27

## 2025-03-27 NOTE — TELEPHONE ENCOUNTER
ANTICOAGULATION     Blanco Osborne is overdue for an INR check.     Spoke with Blanco  and scheduled lab appointment on 4/2/25    Lou Duong, RN  3/27/2025  Anticoagulation Clinic  [facility] for routing messages: vashti MCGREGOR  [facility] patient phone line: [phone]

## 2025-03-31 DIAGNOSIS — K21.00 GASTROESOPHAGEAL REFLUX DISEASE WITH ESOPHAGITIS WITHOUT HEMORRHAGE: ICD-10-CM

## 2025-03-31 RX ORDER — OMEPRAZOLE 40 MG/1
40 CAPSULE, DELAYED RELEASE ORAL DAILY
Qty: 90 CAPSULE | Refills: 1 | Status: SHIPPED | OUTPATIENT
Start: 2025-03-31

## 2025-04-04 ENCOUNTER — LAB (OUTPATIENT)
Dept: LAB | Facility: CLINIC | Age: 60
End: 2025-04-04
Payer: COMMERCIAL

## 2025-04-04 DIAGNOSIS — Z76.82 AWAITING ORGAN TRANSPLANT: ICD-10-CM

## 2025-04-07 DIAGNOSIS — Z94.4 LIVER TRANSPLANTED (H): ICD-10-CM

## 2025-04-07 RX ORDER — TACROLIMUS 1 MG/1
1 CAPSULE ORAL EVERY 12 HOURS
Qty: 180 CAPSULE | Refills: 3 | Status: SHIPPED | OUTPATIENT
Start: 2025-04-07

## 2025-04-10 ENCOUNTER — TELEPHONE (OUTPATIENT)
Dept: ANTICOAGULATION | Facility: CLINIC | Age: 60
End: 2025-04-10
Payer: COMMERCIAL

## 2025-04-10 NOTE — LETTER
"     [facility] ANTICOAGULATION CLINIC  [address] [facility] [address]  Phone: [phone]  Fax: [phone]     April 10, 2025        Blanco Osborne  [address]  [facility] [address]            Dear Blanco,    You are currently under the care of [facility] Anticoagulation [facility] for your warfarin (Coumadin , Jantoven ) therapy.  We are contacting you because our records show you were due for an INR on 3/17/25.    There are potentially serious risks when taking warfarin without careful monitoring and we want to make sure you are safely managed.  Routine lab monitoring is required for warfarin refills.     Please schedule a lab appointment as soon as possible either by calling [phone] or scheduling directly in [facility]. To schedule in [facility] open the \"schedule an appointment\" section then select \"lab only\" as the reason you are coming in and \"INR\" as the lab test. If it is difficult for you to get to lab, please call us to discuss options.  If there has been a change in your care or other concerns, please let us know so we can help and/or update our records.         Sincerely,       [facility] Anticoagulation [facility]    "

## 2025-04-10 NOTE — TELEPHONE ENCOUNTER
ANTICOAGULATION     Blanco Osborne is overdue for an INR check.     Reminder letter sent    Mona Kumar RN  4/10/2025  Anticoagulation Clinic  [facility] for routing messages: vashti MCGREGOR  [facility] patient phone line: [phone]

## 2025-04-29 ENCOUNTER — TELEPHONE (OUTPATIENT)
Dept: ANTICOAGULATION | Facility: CLINIC | Age: 60
End: 2025-04-29
Payer: COMMERCIAL

## 2025-04-29 NOTE — LETTER
"     [facility] ANTICOAGULATION CLINIC  [address] [facility] [address]  Phone: [phone]  Fax: [phone]   April 29, 2025        Blanco Osborne  [address]  [facility] [address]            Dear Blanco,    You are currently under the care of [facility] Anticoagulation [facility] for your warfarin (Coumadin , Jantoven ) therapy.  We are contacting you because our records show you were due for an INR on 3/17/25.    There are potentially serious risks when taking warfarin without careful monitoring and we want to make sure you are safely managed.  Routine lab monitoring is required for warfarin refills.     Please schedule a lab appointment as soon as possible either by calling [phone] or scheduling directly in [facility]. To schedule in [facility] open the \"schedule an appointment\" section then select \"lab only\" as the reason you are coming in and \"INR\" as the lab test. If it is difficult for you to get to lab, please call us to discuss options.  If there has been a change in your care or other concerns, please let us know so we can help and/or update our records.         Sincerely,       [facility] Anticoagulation [facility]    "

## 2025-04-29 NOTE — TELEPHONE ENCOUNTER
Anticoagulation Clinic Notification    Blanco, is past due for an INR. Their last result was 1.7 on 2/14/25 and was due to come back on 3/17/25.    he received phone calls and letters over the last several weeks in attempt to arrange follow up labs. Blanco Osborne will be contacted again today.     Please contact patient directly to discuss compliance with monitoring or schedule visit to review ongoing anticoagulation therapy.    Thank you,     [facility] Anticoagulation Clinic

## 2025-05-01 NOTE — TELEPHONE ENCOUNTER
"Writer was able to contact patient successfully.  -Patient states that he last had his INR drawn 1 week ago at [facility] and states that he is still taking Warfarin.    Patient was encouraged to contact and update Anticoagulation Clinic.   -Patient endorses he \"will try\", adding that he is currently taking care of other health needs and is feeling overwhelmed.  "

## 2025-05-27 ENCOUNTER — TELEPHONE (OUTPATIENT)
Dept: ANTICOAGULATION | Facility: CLINIC | Age: 60
End: 2025-05-27
Payer: COMMERCIAL

## 2025-05-27 NOTE — LETTER
"     [facility] ANTICOAGULATION CLINIC  [address] [facility] [address]  Phone: [phone]  Fax: [phone]   May 27, 2025        Blanco Osborne  [address]  [place] [address]            Dear Blanco,    You are currently under the care of [facility] Anticoagulation [facility] for your warfarin (Coumadin , Jantoven ) therapy.  We are contacting you because our records show you were due for an INR on 3/17/25.    There are potentially serious risks when taking warfarin without careful monitoring and we want to make sure you are safely managed.  Routine lab monitoring is required for warfarin refills.     Please schedule a lab appointment as soon as possible either by calling [phone] or scheduling directly in [facility]. To schedule in [facility] open the \"schedule an appointment\" section then select \"lab only\" as the reason you are coming in and \"INR\" as the lab test. If it is difficult for you to get to lab, please call us to discuss options.  If there has been a change in your care or other concerns, please let us know so we can help and/or update our records.         Sincerely,       [facility] Anticoagulation [facility]    "

## 2025-05-27 NOTE — TELEPHONE ENCOUNTER
ANTICOAGULATION MANAGEMENT PROGRAM    Trevor Carter,     Our records indicate that Blanco Osborne remains past due to check an INR. Blanco Osborne was contacted multiple times over at least the last 8 weeks to attempt to arrange a follow up appointment.    Blanco Osborne last had an an INR checked on 2/14/25 and was due for follow up on 3/17/25     Last [facility] referral date: 09/26/2024    Called patient,Left message for patient to call and schedule lab appointment as soon as possible. If returning call, please schedule.  and Reminder letter sent     At this time Blanco Osborne will be moved to noncompliant status within the program until their referral expires on 9/26/2025. While in noncompliant status the patient would continue to be contacted periodically (~every 6 weeks) by the anticoagulation program to attempt to schedule patient. You will be notified of each contact attempt to make you aware of patient's ongoing noncompliance.         Thank you,     [facility] Anticoagulation Management Program

## 2025-05-30 ENCOUNTER — RESULTS FOLLOW-UP (OUTPATIENT)
Dept: TRANSPLANT | Facility: CLINIC | Age: 60
End: 2025-05-30

## 2025-05-30 DIAGNOSIS — K76.0 NAFLD (NONALCOHOLIC FATTY LIVER DISEASE): ICD-10-CM

## 2025-06-02 ENCOUNTER — TELEPHONE (OUTPATIENT)
Dept: TRANSPLANT | Facility: CLINIC | Age: 60
End: 2025-06-02
Payer: COMMERCIAL

## 2025-06-02 DIAGNOSIS — K76.0 NAFLD (NONALCOHOLIC FATTY LIVER DISEASE): ICD-10-CM

## 2025-06-03 ENCOUNTER — PATIENT OUTREACH (OUTPATIENT)
Dept: CARE COORDINATION | Facility: CLINIC | Age: 60
End: 2025-06-03
Payer: COMMERCIAL

## 2025-06-07 NOTE — PROGRESS NOTES
Renal Medicine Progress Note                                Blanco Osborne MRN# [id number]   Age: 57 year old YOB: 1965   Date of Admission: 11/12/2022 Hospital LOS: 20                  Assessment/Plan:     Blanco Osborne is a 57-year-old male with PMH CARRILLO s/p liver transplant (9/2016) and cirrhosis admitted 11/12/2022 with out of hospital PEA arrest and acute hypoxemic respiratory failure. Course complicated by parainfluenza virus, streptococcal bacteremia, and LORETTA requiring CRRT.      1.  Severe anuric LORETTA:             -CRRT stopped 11/22 and resume on 11/24 and stopped 11/26.    -IHD 11/29/30     2.  Septic shock with MODS:                -no current pressors                  3.  Respiratory failure:    -multiple re intubations    -R Pneumothorax    -Bilateral pleural effusions   -Aspergillus PNA             4.  ESLD due to CARRILLO s/p liver transplant                - tacrolimus on hold               - manage per ICU     5.  Hypoalbuminemia        1 hour dialysis 12/01/22 prior to liver biopsy  Dialysis today     Next 12/04 or 12/05      Interval History:     Trach and PEG  Up in bedside chair  Eyes open  ? follow    No UO       ROS:     Intubated and sedated: ROS unable    Medications and Allergies:     Reviewed    Physical Exam:     Vitals were reviewed  Patient Vitals for the past 8 hrs:   BP Temp Temp src Pulse Resp SpO2 Weight   12/02/22 0800 (!) 142/95 98.9  F (37.2  C) Axillary (!) 128 20 97 % --   12/02/22 0718 -- -- -- -- -- 97 % --   12/02/22 0700 (!) 139/95 -- -- 115 21 -- --   12/02/22 0600 (!) 144/96 -- -- 114 20 -- 86.1 kg (189 lb 13.1 oz)   12/02/22 0500 (!) 142/104 -- -- 115 22 98 % --   12/02/22 0400 (!) 145/90 98.9  F (37.2  C) Axillary 114 16 98 % --   12/02/22 0300 132/84 -- -- 106 20 99 % --   12/02/22 0259 -- -- -- -- -- 98 % --   12/02/22 0200 129/88 -- -- 108 (!) 33 98 % --     I/O last 3 completed shifts:  In: 1590 [I.V.:40; NG/GT:230]  Out: 1100 [Other:200;  Stool:900]    Vitals:    11/28/22 0200 11/29/22 0000 11/30/22 0400 12/01/22 0400   Weight: 93.2 kg (205 lb 7.5 oz) 95.7 kg (210 lb 15.7 oz) 88.5 kg (195 lb 1.7 oz) 90.7 kg (199 lb 15.3 oz)    12/02/22 0600   Weight: 86.1 kg (189 lb 13.1 oz)       GENERAL:  intubated and sedated  HEENT: ETT  RESP:  clear anteriorly  CV: RRR, normal S1 S2  EXT: warm, no edema    Data:     Recent Labs   Lab 12/02/22  0851 12/02/22  0421 12/02/22  0417 12/02/22  0042 12/01/22  1549 12/01/22  1303 11/30/22  1201 11/30/22  0807 11/29/22  0737 11/29/22  0439   NA  --  138  --   --   --  139  --  140  --  138   POTASSIUM  --  4.1  --   --   --  3.7  --  4.3  --  4.4   CHLORIDE  --  103  --   --   --  104  --  105  --  105   CO2  --  23  --   --   --  22  --  24  --  24   ANIONGAP  --  12  --   --   --  13  --  11  --  9   * 219* 222* 302*   < > 220*   < > 143*   < > 149*   BUN  --  95*  --   --   --  103*  --  73*  --  100*   CR  --  4.01*  --   --   --  4.10*  --  2.99*  --  3.74*   GFRESTIMATED  --  16*  --   --   --  16*  --  23*  --  18*   KELLY  --  8.2*  --   --   --  8.1*  --  8.0*  --  7.9*    < > = values in this interval not displayed.         Recent Labs   Lab Test 12/02/22  0421 12/01/22  1303 11/30/22  0807 11/29/22  0439 11/28/22  0504 11/27/22  0447 11/26/22  1121 11/26/22  0403 11/25/22  1947 11/25/22  1150   CR 4.01* 4.10* 2.99* 3.74* 2.86* 2.02* 1.29* 1.28* 1.43* 1.79*     Recent Labs   Lab 11/29/22  0439 11/28/22  0504 11/27/22  0447 11/26/22  1121   ALBUMIN 1.9* 2.0* 2.0* 2.3*     Recent Labs   Lab 12/01/22  1303 11/30/22  0807 11/29/22  0439 11/28/22  0504 11/27/22  0447 11/26/22  1121   PHOS  --   --  4.2 3.7 4.3 4.5   HGB 9.2* 7.9* 7.4* 8.3* 8.3* 9.5*         G Jose Reilly MD    [facility] - Nephrology  [phone]   poor plus

## 2025-06-22 ENCOUNTER — APPOINTMENT (OUTPATIENT)
Dept: GENERAL RADIOLOGY | Facility: CLINIC | Age: 60
End: 2025-06-22
Attending: EMERGENCY MEDICINE
Payer: COMMERCIAL

## 2025-06-22 ENCOUNTER — HOSPITAL ENCOUNTER (INPATIENT)
Facility: CLINIC | Age: 60
LOS: 1 days | Discharge: HOME | End: 2025-06-25
Attending: EMERGENCY MEDICINE | Admitting: HOSPITALIST
Payer: COMMERCIAL

## 2025-06-22 DIAGNOSIS — T82.590S DIALYSIS AV FISTULA MALFUNCTION, SEQUELA: Primary | ICD-10-CM

## 2025-06-22 DIAGNOSIS — N18.6 ANEMIA DUE TO CHRONIC KIDNEY DISEASE, ON CHRONIC DIALYSIS (H): ICD-10-CM

## 2025-06-22 DIAGNOSIS — R07.9 CHEST PAIN, UNSPECIFIED TYPE: ICD-10-CM

## 2025-06-22 DIAGNOSIS — Z99.2 DIALYSIS PATIENT: ICD-10-CM

## 2025-06-22 DIAGNOSIS — G89.18 ACUTE POST-OPERATIVE PAIN: ICD-10-CM

## 2025-06-22 DIAGNOSIS — D63.1 ANEMIA DUE TO CHRONIC KIDNEY DISEASE, ON CHRONIC DIALYSIS (H): ICD-10-CM

## 2025-06-22 DIAGNOSIS — R06.02 SHORTNESS OF BREATH: ICD-10-CM

## 2025-06-22 DIAGNOSIS — T82.49XA CLOTTED DIALYSIS ACCESS, INITIAL ENCOUNTER: ICD-10-CM

## 2025-06-22 DIAGNOSIS — Z99.2 ANEMIA DUE TO CHRONIC KIDNEY DISEASE, ON CHRONIC DIALYSIS (H): ICD-10-CM

## 2025-06-22 LAB
ANION GAP SERPL CALCULATED.3IONS-SCNC: 16 MMOL/L (ref 7–15)
ATRIAL RATE - MUSE: 83 BPM
BASOPHILS # BLD AUTO: 0 10E3/UL (ref 0–0.2)
BASOPHILS NFR BLD AUTO: 1 %
BUN SERPL-MCNC: 60.4 MG/DL (ref 8–23)
CALCIUM SERPL-MCNC: 8.6 MG/DL (ref 8.8–10.4)
CHLORIDE SERPL-SCNC: 91 MMOL/L (ref 98–107)
CREAT SERPL-MCNC: 10.37 MG/DL (ref 0.67–1.17)
DIASTOLIC BLOOD PRESSURE - MUSE: NORMAL MMHG
EGFRCR SERPLBLD CKD-EPI 2021: 5 ML/MIN/1.73M2
EOSINOPHIL # BLD AUTO: 0.1 10E3/UL (ref 0–0.7)
EOSINOPHIL NFR BLD AUTO: 2 %
ERYTHROCYTE [DISTWIDTH] IN BLOOD BY AUTOMATED COUNT: 14.1 % (ref 10–15)
GLUCOSE SERPL-MCNC: 106 MG/DL (ref 70–99)
HCO3 SERPL-SCNC: 21 MMOL/L (ref 22–29)
HCT VFR BLD AUTO: 27.6 % (ref 40–53)
HGB BLD-MCNC: 9.4 G/DL (ref 13.3–17.7)
HOLD SPECIMEN: NORMAL
HOLD SPECIMEN: NORMAL
IMM GRANULOCYTES # BLD: 0 10E3/UL
IMM GRANULOCYTES NFR BLD: 1 %
INTERPRETATION ECG - MUSE: NORMAL
LYMPHOCYTES # BLD AUTO: 0.8 10E3/UL (ref 0.8–5.3)
LYMPHOCYTES NFR BLD AUTO: 21 %
MAGNESIUM SERPL-MCNC: 2.2 MG/DL (ref 1.7–2.3)
MCH RBC QN AUTO: 33.8 PG (ref 26.5–33)
MCHC RBC AUTO-ENTMCNC: 34.1 G/DL (ref 31.5–36.5)
MCV RBC AUTO: 99 FL (ref 78–100)
MONOCYTES # BLD AUTO: 0.4 10E3/UL (ref 0–1.3)
MONOCYTES NFR BLD AUTO: 10 %
NEUTROPHILS # BLD AUTO: 2.7 10E3/UL (ref 1.6–8.3)
NEUTROPHILS NFR BLD AUTO: 67 %
NRBC # BLD AUTO: 0 10E3/UL
NRBC BLD AUTO-RTO: 0 /100
NT-PROBNP SERPL-MCNC: 4341 PG/ML (ref 0–177)
P AXIS - MUSE: 47 DEGREES
PHOSPHATE SERPL-MCNC: 5.5 MG/DL (ref 2.5–4.5)
PLATELET # BLD AUTO: 77 10E3/UL (ref 150–450)
POTASSIUM SERPL-SCNC: 4.8 MMOL/L (ref 3.4–5.3)
PR INTERVAL - MUSE: 192 MS
QRS DURATION - MUSE: 66 MS
QT - MUSE: 376 MS
QTC - MUSE: 441 MS
R AXIS - MUSE: 40 DEGREES
RBC # BLD AUTO: 2.78 10E6/UL (ref 4.4–5.9)
SODIUM SERPL-SCNC: 128 MMOL/L (ref 135–145)
SYSTOLIC BLOOD PRESSURE - MUSE: NORMAL MMHG
T AXIS - MUSE: 44 DEGREES
TROPONIN T SERPL HS-MCNC: 47 NG/L
TROPONIN T SERPL HS-MCNC: 48 NG/L
VENTRICULAR RATE- MUSE: 83 BPM
WBC # BLD AUTO: 4.1 10E3/UL (ref 4–11)

## 2025-06-22 PROCEDURE — 99222 1ST HOSP IP/OBS MODERATE 55: CPT | Performed by: HOSPITALIST

## 2025-06-22 PROCEDURE — 71046 X-RAY EXAM CHEST 2 VIEWS: CPT

## 2025-06-22 PROCEDURE — 99291 CRITICAL CARE FIRST HOUR: CPT | Mod: 25

## 2025-06-22 PROCEDURE — 36415 COLL VENOUS BLD VENIPUNCTURE: CPT | Performed by: EMERGENCY MEDICINE

## 2025-06-22 PROCEDURE — G0378 HOSPITAL OBSERVATION PER HR: HCPCS

## 2025-06-22 PROCEDURE — 87340 HEPATITIS B SURFACE AG IA: CPT | Performed by: INTERNAL MEDICINE

## 2025-06-22 PROCEDURE — 84100 ASSAY OF PHOSPHORUS: CPT | Performed by: EMERGENCY MEDICINE

## 2025-06-22 PROCEDURE — 83880 ASSAY OF NATRIURETIC PEPTIDE: CPT | Performed by: EMERGENCY MEDICINE

## 2025-06-22 PROCEDURE — 85004 AUTOMATED DIFF WBC COUNT: CPT | Performed by: EMERGENCY MEDICINE

## 2025-06-22 PROCEDURE — 93005 ELECTROCARDIOGRAM TRACING: CPT

## 2025-06-22 PROCEDURE — 84484 ASSAY OF TROPONIN QUANT: CPT | Performed by: EMERGENCY MEDICINE

## 2025-06-22 PROCEDURE — 83735 ASSAY OF MAGNESIUM: CPT | Performed by: EMERGENCY MEDICINE

## 2025-06-22 PROCEDURE — 250N000013 HC RX MED GY IP 250 OP 250 PS 637: Performed by: EMERGENCY MEDICINE

## 2025-06-22 PROCEDURE — 86706 HEP B SURFACE ANTIBODY: CPT | Performed by: INTERNAL MEDICINE

## 2025-06-22 PROCEDURE — 80048 BASIC METABOLIC PNL TOTAL CA: CPT | Performed by: EMERGENCY MEDICINE

## 2025-06-22 PROCEDURE — 99292 CRITICAL CARE ADDL 30 MIN: CPT

## 2025-06-22 RX ORDER — ACETAMINOPHEN 650 MG/1
650 SUPPOSITORY RECTAL EVERY 4 HOURS PRN
Status: DISCONTINUED | OUTPATIENT
Start: 2025-06-22 | End: 2025-06-25 | Stop reason: HOSPADM

## 2025-06-22 RX ORDER — OXYCODONE HYDROCHLORIDE 5 MG/1
5 TABLET ORAL ONCE
Refills: 0 | Status: COMPLETED | OUTPATIENT
Start: 2025-06-22 | End: 2025-06-22

## 2025-06-22 RX ORDER — ONDANSETRON 2 MG/ML
4 INJECTION INTRAMUSCULAR; INTRAVENOUS EVERY 6 HOURS PRN
Status: DISCONTINUED | OUTPATIENT
Start: 2025-06-22 | End: 2025-06-25 | Stop reason: HOSPADM

## 2025-06-22 RX ORDER — ACETAMINOPHEN 325 MG/1
650 TABLET ORAL EVERY 4 HOURS PRN
Status: DISCONTINUED | OUTPATIENT
Start: 2025-06-22 | End: 2025-06-25 | Stop reason: HOSPADM

## 2025-06-22 RX ORDER — ONDANSETRON 4 MG/1
4 TABLET, ORALLY DISINTEGRATING ORAL EVERY 6 HOURS PRN
Status: DISCONTINUED | OUTPATIENT
Start: 2025-06-22 | End: 2025-06-25 | Stop reason: HOSPADM

## 2025-06-22 RX ORDER — AMOXICILLIN 250 MG
1 CAPSULE ORAL 2 TIMES DAILY PRN
Status: DISCONTINUED | OUTPATIENT
Start: 2025-06-22 | End: 2025-06-25 | Stop reason: HOSPADM

## 2025-06-22 RX ORDER — PROCHLORPERAZINE MALEATE 10 MG
10 TABLET ORAL EVERY 6 HOURS PRN
Status: DISCONTINUED | OUTPATIENT
Start: 2025-06-22 | End: 2025-06-25 | Stop reason: HOSPADM

## 2025-06-22 RX ORDER — AMOXICILLIN 250 MG
2 CAPSULE ORAL 2 TIMES DAILY PRN
Status: DISCONTINUED | OUTPATIENT
Start: 2025-06-22 | End: 2025-06-25 | Stop reason: HOSPADM

## 2025-06-22 RX ADMIN — OXYCODONE HYDROCHLORIDE 5 MG: 5 TABLET ORAL at 22:16

## 2025-06-22 ASSESSMENT — ACTIVITIES OF DAILY LIVING (ADL)
ADLS_ACUITY_SCORE: 57

## 2025-06-22 ASSESSMENT — COLUMBIA-SUICIDE SEVERITY RATING SCALE - C-SSRS
2. HAVE YOU ACTUALLY HAD ANY THOUGHTS OF KILLING YOURSELF IN THE PAST MONTH?: NO
1. IN THE PAST MONTH, HAVE YOU WISHED YOU WERE DEAD OR WISHED YOU COULD GO TO SLEEP AND NOT WAKE UP?: NO
6. HAVE YOU EVER DONE ANYTHING, STARTED TO DO ANYTHING, OR PREPARED TO DO ANYTHING TO END YOUR LIFE?: NO

## 2025-06-23 ENCOUNTER — APPOINTMENT (OUTPATIENT)
Dept: INTERVENTIONAL RADIOLOGY/VASCULAR | Facility: CLINIC | Age: 60
End: 2025-06-23
Attending: HOSPITALIST
Payer: COMMERCIAL

## 2025-06-23 ENCOUNTER — TELEPHONE (OUTPATIENT)
Dept: OTHER | Facility: CLINIC | Age: 60
End: 2025-06-23

## 2025-06-23 ENCOUNTER — APPOINTMENT (OUTPATIENT)
Dept: INTERVENTIONAL RADIOLOGY/VASCULAR | Facility: CLINIC | Age: 60
End: 2025-06-23
Attending: NURSE PRACTITIONER
Payer: COMMERCIAL

## 2025-06-23 LAB
ANION GAP SERPL CALCULATED.3IONS-SCNC: 16 MMOL/L (ref 7–15)
BUN SERPL-MCNC: 66.2 MG/DL (ref 8–23)
CALCIUM SERPL-MCNC: 8.3 MG/DL (ref 8.8–10.4)
CHLORIDE SERPL-SCNC: 96 MMOL/L (ref 98–107)
CREAT SERPL-MCNC: 10.79 MG/DL (ref 0.67–1.17)
EGFRCR SERPLBLD CKD-EPI 2021: 5 ML/MIN/1.73M2
GLUCOSE SERPL-MCNC: 98 MG/DL (ref 70–99)
HBV SURFACE AB SERPL IA-ACNC: <3.5 M[IU]/ML
HBV SURFACE AB SERPL IA-ACNC: NONREACTIVE M[IU]/ML
HBV SURFACE AG SERPL QL IA: NONREACTIVE
HCO3 SERPL-SCNC: 21 MMOL/L (ref 22–29)
HOLD SPECIMEN: NORMAL
HOLD SPECIMEN: NORMAL
INR PPP: 1.31 (ref 0.85–1.15)
POTASSIUM SERPL-SCNC: 4.6 MMOL/L (ref 3.4–5.3)
PROTHROMBIN TIME: 16 SECONDS (ref 11.8–14.8)
SODIUM SERPL-SCNC: 133 MMOL/L (ref 135–145)

## 2025-06-23 PROCEDURE — 258N000003 HC RX IP 258 OP 636: Performed by: INTERNAL MEDICINE

## 2025-06-23 PROCEDURE — 85610 PROTHROMBIN TIME: CPT | Performed by: NURSE PRACTITIONER

## 2025-06-23 PROCEDURE — 272N000302 HC DEVICE INFLATION CR5

## 2025-06-23 PROCEDURE — C1757 CATH, THROMBECTOMY/EMBOLECT: HCPCS

## 2025-06-23 PROCEDURE — B51W1ZA FLUOROSCOPY OF DIALYSIS SHUNT/FISTULA USING LOW OSMOLAR CONTRAST, GUIDANCE: ICD-10-PCS | Performed by: STUDENT IN AN ORGANIZED HEALTH CARE EDUCATION/TRAINING PROGRAM

## 2025-06-23 PROCEDURE — G0378 HOSPITAL OBSERVATION PER HR: HCPCS

## 2025-06-23 PROCEDURE — 250N000013 HC RX MED GY IP 250 OP 250 PS 637: Performed by: INTERNAL MEDICINE

## 2025-06-23 PROCEDURE — 36415 COLL VENOUS BLD VENIPUNCTURE: CPT | Performed by: NURSE PRACTITIONER

## 2025-06-23 PROCEDURE — 250N000011 HC RX IP 250 OP 636: Performed by: STUDENT IN AN ORGANIZED HEALTH CARE EDUCATION/TRAINING PROGRAM

## 2025-06-23 PROCEDURE — 99221 1ST HOSP IP/OBS SF/LOW 40: CPT | Mod: 57 | Performed by: SURGERY

## 2025-06-23 PROCEDURE — 272N000196 HC ACCESSORY CR5

## 2025-06-23 PROCEDURE — C1769 GUIDE WIRE: HCPCS

## 2025-06-23 PROCEDURE — 90935 HEMODIALYSIS ONE EVALUATION: CPT

## 2025-06-23 PROCEDURE — 3E03317 INTRODUCTION OF OTHER THROMBOLYTIC INTO PERIPHERAL VEIN, PERCUTANEOUS APPROACH: ICD-10-PCS | Performed by: STUDENT IN AN ORGANIZED HEALTH CARE EDUCATION/TRAINING PROGRAM

## 2025-06-23 PROCEDURE — 5A1D70Z PERFORMANCE OF URINARY FILTRATION, INTERMITTENT, LESS THAN 6 HOURS PER DAY: ICD-10-PCS | Performed by: INTERNAL MEDICINE

## 2025-06-23 PROCEDURE — 99232 SBSQ HOSP IP/OBS MODERATE 35: CPT | Performed by: PHYSICIAN ASSISTANT

## 2025-06-23 PROCEDURE — 250N000013 HC RX MED GY IP 250 OP 250 PS 637: Performed by: HOSPITALIST

## 2025-06-23 PROCEDURE — 250N000009 HC RX 250: Performed by: NURSE PRACTITIONER

## 2025-06-23 PROCEDURE — 80048 BASIC METABOLIC PNL TOTAL CA: CPT | Performed by: HOSPITALIST

## 2025-06-23 PROCEDURE — 250N000011 HC RX IP 250 OP 636: Performed by: NURSE PRACTITIONER

## 2025-06-23 PROCEDURE — 272N000123 HC CATH CR9

## 2025-06-23 PROCEDURE — 0JH63XZ INSERTION OF TUNNELED VASCULAR ACCESS DEVICE INTO CHEST SUBCUTANEOUS TISSUE AND FASCIA, PERCUTANEOUS APPROACH: ICD-10-PCS | Performed by: STUDENT IN AN ORGANIZED HEALTH CARE EDUCATION/TRAINING PROGRAM

## 2025-06-23 PROCEDURE — 36415 COLL VENOUS BLD VENIPUNCTURE: CPT | Performed by: HOSPITALIST

## 2025-06-23 PROCEDURE — 99213 OFFICE O/P EST LOW 20 MIN: CPT | Performed by: SURGERY

## 2025-06-23 PROCEDURE — 250N000012 HC RX MED GY IP 250 OP 636 PS 637: Performed by: PHYSICIAN ASSISTANT

## 2025-06-23 PROCEDURE — 76937 US GUIDE VASCULAR ACCESS: CPT

## 2025-06-23 PROCEDURE — 250N000013 HC RX MED GY IP 250 OP 250 PS 637: Performed by: PHYSICIAN ASSISTANT

## 2025-06-23 PROCEDURE — C1725 CATH, TRANSLUMIN NON-LASER: HCPCS

## 2025-06-23 PROCEDURE — C1750 CATH, HEMODIALYSIS,LONG-TERM: HCPCS

## 2025-06-23 PROCEDURE — 272N000564 HC SHEATH CR2

## 2025-06-23 PROCEDURE — 02H633Z INSERTION OF INFUSION DEVICE INTO RIGHT ATRIUM, PERCUTANEOUS APPROACH: ICD-10-PCS | Performed by: STUDENT IN AN ORGANIZED HEALTH CARE EDUCATION/TRAINING PROGRAM

## 2025-06-23 PROCEDURE — 255N000002 HC RX 255 OP 636: Performed by: STUDENT IN AN ORGANIZED HEALTH CARE EDUCATION/TRAINING PROGRAM

## 2025-06-23 PROCEDURE — 99214 OFFICE O/P EST MOD 30 MIN: CPT | Performed by: INTERNAL MEDICINE

## 2025-06-23 PROCEDURE — 05CY3ZZ EXTIRPATION OF MATTER FROM UPPER VEIN, PERCUTANEOUS APPROACH: ICD-10-PCS | Performed by: STUDENT IN AN ORGANIZED HEALTH CARE EDUCATION/TRAINING PROGRAM

## 2025-06-23 PROCEDURE — 272N000566 HC SHEATH CR3

## 2025-06-23 PROCEDURE — 250N000009 HC RX 250: Performed by: INTERNAL MEDICINE

## 2025-06-23 PROCEDURE — 99152 MOD SED SAME PHYS/QHP 5/>YRS: CPT

## 2025-06-23 PROCEDURE — 037Y3ZZ DILATION OF UPPER ARTERY, PERCUTANEOUS APPROACH: ICD-10-PCS | Performed by: STUDENT IN AN ORGANIZED HEALTH CARE EDUCATION/TRAINING PROGRAM

## 2025-06-23 RX ORDER — B COMPLEX, C NO.20/FOLIC ACID 1 MG
1 CAPSULE ORAL DAILY
Status: DISCONTINUED | OUTPATIENT
Start: 2025-06-23 | End: 2025-06-25 | Stop reason: HOSPADM

## 2025-06-23 RX ORDER — NALOXONE HYDROCHLORIDE 0.4 MG/ML
0.2 INJECTION, SOLUTION INTRAMUSCULAR; INTRAVENOUS; SUBCUTANEOUS
Status: DISCONTINUED | OUTPATIENT
Start: 2025-06-23 | End: 2025-06-25 | Stop reason: HOSPADM

## 2025-06-23 RX ORDER — MIDODRINE HYDROCHLORIDE 10 MG/1
30 TABLET ORAL ONCE
Status: COMPLETED | OUTPATIENT
Start: 2025-06-23 | End: 2025-06-23

## 2025-06-23 RX ORDER — HEPARIN SODIUM 1000 [USP'U]/ML
500-10000 INJECTION, SOLUTION INTRAVENOUS; SUBCUTANEOUS
Status: COMPLETED | OUTPATIENT
Start: 2025-06-23 | End: 2025-06-23

## 2025-06-23 RX ORDER — ASPIRIN 81 MG/1
81 TABLET ORAL DAILY
Status: DISCONTINUED | OUTPATIENT
Start: 2025-06-23 | End: 2025-06-25 | Stop reason: HOSPADM

## 2025-06-23 RX ORDER — TRAZODONE HYDROCHLORIDE 50 MG/1
50 TABLET ORAL
Status: DISCONTINUED | OUTPATIENT
Start: 2025-06-23 | End: 2025-06-25 | Stop reason: HOSPADM

## 2025-06-23 RX ORDER — MIDODRINE HYDROCHLORIDE 5 MG/1
10 TABLET ORAL
Status: DISCONTINUED | OUTPATIENT
Start: 2025-06-23 | End: 2025-06-25 | Stop reason: HOSPADM

## 2025-06-23 RX ORDER — LACOSAMIDE 50 MG/1
50 TABLET ORAL 2 TIMES DAILY
Status: DISCONTINUED | OUTPATIENT
Start: 2025-06-23 | End: 2025-06-25 | Stop reason: HOSPADM

## 2025-06-23 RX ORDER — IOPAMIDOL 612 MG/ML
100 INJECTION, SOLUTION INTRAVASCULAR ONCE
Status: COMPLETED | OUTPATIENT
Start: 2025-06-23 | End: 2025-06-23

## 2025-06-23 RX ORDER — FLUMAZENIL 0.1 MG/ML
0.2 INJECTION, SOLUTION INTRAVENOUS
Status: DISCONTINUED | OUTPATIENT
Start: 2025-06-23 | End: 2025-06-23 | Stop reason: HOSPADM

## 2025-06-23 RX ORDER — TACROLIMUS 1 MG/1
1 CAPSULE ORAL 2 TIMES DAILY
Status: DISCONTINUED | OUTPATIENT
Start: 2025-06-23 | End: 2025-06-25 | Stop reason: HOSPADM

## 2025-06-23 RX ORDER — MIDODRINE HYDROCHLORIDE 10 MG/1
30 TABLET ORAL
Status: DISCONTINUED | OUTPATIENT
Start: 2025-06-23 | End: 2025-06-25 | Stop reason: HOSPADM

## 2025-06-23 RX ORDER — NALOXONE HYDROCHLORIDE 0.4 MG/ML
0.4 INJECTION, SOLUTION INTRAMUSCULAR; INTRAVENOUS; SUBCUTANEOUS
Status: DISCONTINUED | OUTPATIENT
Start: 2025-06-23 | End: 2025-06-25 | Stop reason: HOSPADM

## 2025-06-23 RX ORDER — SEVELAMER CARBONATE 800 MG/1
800 TABLET, FILM COATED ORAL 4 TIMES DAILY PRN
Status: DISCONTINUED | OUTPATIENT
Start: 2025-06-23 | End: 2025-06-25 | Stop reason: HOSPADM

## 2025-06-23 RX ORDER — HEPARIN SODIUM 1000 [USP'U]/ML
5000 INJECTION, SOLUTION INTRAVENOUS; SUBCUTANEOUS ONCE
Status: COMPLETED | OUTPATIENT
Start: 2025-06-23 | End: 2025-06-23

## 2025-06-23 RX ORDER — GABAPENTIN 300 MG/1
300 CAPSULE ORAL AT BEDTIME
Status: DISCONTINUED | OUTPATIENT
Start: 2025-06-23 | End: 2025-06-25 | Stop reason: HOSPADM

## 2025-06-23 RX ORDER — ROPINIROLE 0.25 MG/1
0.5 TABLET, FILM COATED ORAL 2 TIMES DAILY
Status: DISCONTINUED | OUTPATIENT
Start: 2025-06-23 | End: 2025-06-25 | Stop reason: HOSPADM

## 2025-06-23 RX ORDER — B COMPLEX, C NO.20/FOLIC ACID 1 MG
1 CAPSULE ORAL DAILY
Status: DISCONTINUED | OUTPATIENT
Start: 2025-06-24 | End: 2025-06-23

## 2025-06-23 RX ORDER — FENTANYL CITRATE 50 UG/ML
25-50 INJECTION, SOLUTION INTRAMUSCULAR; INTRAVENOUS EVERY 5 MIN PRN
Status: DISCONTINUED | OUTPATIENT
Start: 2025-06-23 | End: 2025-06-23 | Stop reason: HOSPADM

## 2025-06-23 RX ORDER — NALOXONE HYDROCHLORIDE 0.4 MG/ML
0.2 INJECTION, SOLUTION INTRAMUSCULAR; INTRAVENOUS; SUBCUTANEOUS
Status: DISCONTINUED | OUTPATIENT
Start: 2025-06-23 | End: 2025-06-23 | Stop reason: HOSPADM

## 2025-06-23 RX ORDER — NALOXONE HYDROCHLORIDE 0.4 MG/ML
0.4 INJECTION, SOLUTION INTRAMUSCULAR; INTRAVENOUS; SUBCUTANEOUS
Status: DISCONTINUED | OUTPATIENT
Start: 2025-06-23 | End: 2025-06-23 | Stop reason: HOSPADM

## 2025-06-23 RX ORDER — HEPARIN SODIUM 200 [USP'U]/100ML
1 INJECTION, SOLUTION INTRAVENOUS EVERY 5 MIN PRN
Status: DISCONTINUED | OUTPATIENT
Start: 2025-06-23 | End: 2025-06-23 | Stop reason: HOSPADM

## 2025-06-23 RX ORDER — WARFARIN SODIUM 4 MG/1
8 TABLET ORAL
Status: DISCONTINUED | OUTPATIENT
Start: 2025-06-23 | End: 2025-06-23

## 2025-06-23 RX ORDER — OXYCODONE HYDROCHLORIDE 5 MG/1
5 TABLET ORAL EVERY 6 HOURS PRN
Refills: 0 | Status: DISCONTINUED | OUTPATIENT
Start: 2025-06-23 | End: 2025-06-24

## 2025-06-23 RX ORDER — PANTOPRAZOLE SODIUM 40 MG/1
40 TABLET, DELAYED RELEASE ORAL
Status: DISCONTINUED | OUTPATIENT
Start: 2025-06-23 | End: 2025-06-25 | Stop reason: HOSPADM

## 2025-06-23 RX ORDER — CALCITRIOL 0.25 UG/1
0.25 CAPSULE, LIQUID FILLED ORAL
Status: DISCONTINUED | OUTPATIENT
Start: 2025-06-23 | End: 2025-06-25 | Stop reason: HOSPADM

## 2025-06-23 RX ADMIN — LIDOCAINE HYDROCHLORIDE 14 ML: 10 INJECTION, SOLUTION INFILTRATION; PERINEURAL at 17:16

## 2025-06-23 RX ADMIN — FENTANYL CITRATE 25 MCG: 50 INJECTION, SOLUTION INTRAMUSCULAR; INTRAVENOUS at 15:46

## 2025-06-23 RX ADMIN — Medication 1 CAPSULE: at 10:05

## 2025-06-23 RX ADMIN — Medication: at 20:20

## 2025-06-23 RX ADMIN — MIDAZOLAM 0.5 MG: 1 INJECTION INTRAMUSCULAR; INTRAVENOUS at 16:32

## 2025-06-23 RX ADMIN — MIDAZOLAM 1 MG: 1 INJECTION INTRAMUSCULAR; INTRAVENOUS at 15:37

## 2025-06-23 RX ADMIN — HEPARIN SODIUM 3000 UNITS: 1000 INJECTION, SOLUTION INTRAVENOUS; SUBCUTANEOUS at 16:00

## 2025-06-23 RX ADMIN — SODIUM CHLORIDE 200 ML: 0.9 INJECTION, SOLUTION INTRAVENOUS at 19:34

## 2025-06-23 RX ADMIN — MIDODRINE HYDROCHLORIDE 10 MG: 10 TABLET ORAL at 14:07

## 2025-06-23 RX ADMIN — PANTOPRAZOLE SODIUM 40 MG: 40 TABLET, DELAYED RELEASE ORAL at 10:08

## 2025-06-23 RX ADMIN — MIDODRINE HYDROCHLORIDE 30 MG: 10 TABLET ORAL at 10:04

## 2025-06-23 RX ADMIN — SODIUM CHLORIDE 200 ML: 0.9 INJECTION, SOLUTION INTRAVENOUS at 23:06

## 2025-06-23 RX ADMIN — FENTANYL CITRATE 25 MCG: 50 INJECTION, SOLUTION INTRAMUSCULAR; INTRAVENOUS at 16:32

## 2025-06-23 RX ADMIN — LACOSAMIDE 50 MG: 50 TABLET, FILM COATED ORAL at 10:02

## 2025-06-23 RX ADMIN — FENTANYL CITRATE 25 MCG: 50 INJECTION, SOLUTION INTRAMUSCULAR; INTRAVENOUS at 16:53

## 2025-06-23 RX ADMIN — IOPAMIDOL 60 ML: 612 INJECTION, SOLUTION INTRAVENOUS at 17:28

## 2025-06-23 RX ADMIN — MIDAZOLAM 0.5 MG: 1 INJECTION INTRAMUSCULAR; INTRAVENOUS at 16:52

## 2025-06-23 RX ADMIN — FENTANYL CITRATE 50 MCG: 50 INJECTION, SOLUTION INTRAMUSCULAR; INTRAVENOUS at 15:41

## 2025-06-23 RX ADMIN — OXYCODONE HYDROCHLORIDE 5 MG: 5 TABLET ORAL at 10:02

## 2025-06-23 RX ADMIN — TACROLIMUS 1 MG: 1 CAPSULE ORAL at 10:03

## 2025-06-23 RX ADMIN — OXYCODONE HYDROCHLORIDE 5 MG: 5 TABLET ORAL at 03:07

## 2025-06-23 RX ADMIN — OXYCODONE HYDROCHLORIDE 5 MG: 5 TABLET ORAL at 18:44

## 2025-06-23 RX ADMIN — CALCITRIOL CAPSULES 0.25 MCG 0.25 MCG: 0.25 CAPSULE ORAL at 10:03

## 2025-06-23 RX ADMIN — HEPARIN SODIUM 3000 UNITS: 1000 INJECTION, SOLUTION INTRAVENOUS; SUBCUTANEOUS at 16:37

## 2025-06-23 RX ADMIN — ALTEPLASE 6 MG: 2.2 INJECTION, POWDER, LYOPHILIZED, FOR SOLUTION INTRAVENOUS at 15:58

## 2025-06-23 RX ADMIN — HEPARIN SODIUM 4000 UNITS: 1000 INJECTION, SOLUTION INTRAVENOUS; SUBCUTANEOUS at 17:15

## 2025-06-23 RX ADMIN — LIDOCAINE HYDROCHLORIDE 2 ML: 10 INJECTION, SOLUTION INFILTRATION; PERINEURAL at 15:48

## 2025-06-23 RX ADMIN — MIDODRINE HYDROCHLORIDE 10 MG: 10 TABLET ORAL at 19:32

## 2025-06-23 ASSESSMENT — ACTIVITIES OF DAILY LIVING (ADL)
ADLS_ACUITY_SCORE: 58
ADLS_ACUITY_SCORE: 57
ADLS_ACUITY_SCORE: 58
ADLS_ACUITY_SCORE: 57
ADLS_ACUITY_SCORE: 58
ADLS_ACUITY_SCORE: 57
ADLS_ACUITY_SCORE: 58
ADLS_ACUITY_SCORE: 57
ADLS_ACUITY_SCORE: 58
ADLS_ACUITY_SCORE: 57

## 2025-06-23 NOTE — ED PROVIDER NOTES
Emergency Department Note      History of Present Illness     Chief Complaint   Shortness of Breath      HPI   Blanco Osborne is a 60 year old male with ESRD on dialysis M/W/F who is presenting for shortness of breath that started today.  He last had dialysis 4 days ago on Wednesday but was unable to get it 2 days ago on Friday due to the fistula not working and having a presumed clot.  No longer can feel a thrill.  Was out of town on Friday.  Today he feels winded walking from room to room but was able to walk into the Emergency Department without needing to take a break.  Dull chest pain starting today as well.  No swelling of the legs.  Does not make urine anymore.  Has not been able to talk to nephrology or coordinate IR treatment due to it being a weekend.  Sees [facility], Dr. Bradshaw.    Independent Historian   Patient    Review of External Notes   Phone call noted from 2 days ago trying to get IR scheduled but not successful in hearing back about an appointment time    Past Medical History     Medical History and Problem List   Past Medical History:   Diagnosis Date    Acquired immunocompromised state 11/19/2022    Acute renal failure, unspecified acute renal failure type 11/12/2022    Antiplatelet or antithrombotic long-term use     Arrhythmia     Ascites     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    Cancer (H) 2023    Cirrhosis of liver with ascites (H) 02/11/2016    Coagulopathy     Critical illness myopathy     Dialysis patient     Embolic stroke (H) 11/20/2022    Encephalopathy     ESRD (end stage renal disease) on dialysis (H)     Gastroesophageal reflux disease     H/O alcohol abuse     History of blood transfusion     Hyperammonemia     Hyperlipidemia     Hypertension     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    Insomnia     Lactic acidosis 11/12/2022    Liver replaced by transplant (H) 09/20/2016    NAFLD (nonalcoholic fatty liver disease) 02/11/2016    CHRISTIAN on CPAP      Parainfluenza type 1 infection 11/19/2022    Parapneumonic effusion     Pleural effusion     Pneumothorax on left 12/16/2022    Respiratory acidosis 11/12/2022    Respiratory arrest (H) 11/12/2022    Restless legs syndrome (RLS)     SBP (spontaneous bacterial peritonitis) (H)     Seizures (H) 12/2022    Sepsis due to Streptococcus pneumoniae with acute hypoxic respiratory failure (H) 11/19/2022    Stroke, embolic (H) 11/20/2022    Sudden cardiac arrest (H) 12/29/2022    Tubular adenoma 10/2019       Medications   acetaminophen (TYLENOL) 325 MG tablet  aspirin 81 MG EC tablet  gabapentin (NEURONTIN) 100 MG capsule  hydrOXYzine HCl (ATARAX) 25 MG tablet  lacosamide (VIMPAT) 50 MG TABS tablet  melatonin 3 MG tablet  midodrine (PROAMATINE) 10 MG tablet  multivitamin RENAL (TRIPHROCAPS) 1 capsule capsule  omeprazole (PRILOSEC) 40 MG DR capsule  oxyCODONE (ROXICODONE) 5 MG tablet  rOPINIRole (REQUIP) 0.5 MG tablet  sevelamer carbonate (RENVELA) 800 MG tablet  tacrolimus (GENERIC EQUIVALENT) 1 MG capsule  traZODone (DESYREL) 50 MG tablet  warfarin ANTICOAGULANT (COUMADIN) 2 MG tablet        Surgical History   Past Surgical History:   Procedure Laterality Date    APPENDECTOMY      BENCH LIVER N/A 09/20/2016    Procedure: BENCH LIVER;  Surgeon: Enoc Crews MD;  Location: [facility] OR    BRONCHOSCOPY FLEXIBLE AND RIGID N/A 12/20/2022    Procedure: BRONCHOSCOPY;  Surgeon: Alena Valenzuela MD;  Location:  GI    COLONOSCOPY  08/06/2013    repeat in 2018    COLONOSCOPY N/A 10/04/2019    Procedure: COLONOSCOPY, WITH POLYPECTOMY AND BIOPSY;  Surgeon: Go Chong MD;  Location: [facility] OR    COLONOSCOPY N/A 3/26/2024    Procedure: COLONOSCOPY, FLEXIBLE, WITH LESION REMOVAL USING SNARE;  Surgeon: Jie Douglas MD;  Location:  GI    CREATE FISTULA ARTERIOVENOUS UPPER EXTREMITY Left 03/21/2023    Procedure: LEFT UPPER EXTREMITY ARTERIOVENOUS FISTULA CREATION. LIGATION OF COMPETING VASCULAR BRANCHES- LEFT;   Surgeon: Deejay Mcneil MD;  Location:  OR    CV CORONARY ANGIOGRAM N/A 2024    Procedure: Coronary Angiogram;  Surgeon: Nima Dimas MD;  Location:  HEART CARDIAC CATH LAB    CV PERICARDIOCENTESIS N/A 2023    Procedure: Pericardiocentesis;  Surgeon: Barry Mckeon MD;  Location:  HEART CARDIAC CATH LAB    CV PERICARDIOCENTESIS N/A 10/11/2023    Procedure: Pericardiocentesis;  Surgeon: Ulises Bhakta MD;  Location:  HEART CARDIAC CATH LAB    CV RIGHT HEART CATH MEASUREMENTS RECORDED N/A 10/11/2023    Procedure: Right Heart Catheterization;  Surgeon: Ulises Bhakta MD;  Location:  HEART CARDIAC CATH LAB    HERNIA REPAIR      IR CHEST TUBE PLACEMENT NON-TUNNELED RIGHT  2022    IR CVC TUNNEL PLACEMENT > 5 YRS OF AGE  2022    IR CVC TUNNEL PLACEMENT > 5 YRS OF AGE  1/3/2024    IR DIALYSIS FISTULOGRAM LEFT  2023    IR GASTROSTOMY TUBE CHANGE  2023    IR GASTROSTOMY TUBE PERCUTANEOUS PLCMNT  2022    IR PARACENTESIS  2022    IR PARACENTESIS  2022    IR PARACENTESIS  2/10/2016    IR PARACENTESIS  2016    IR THORACENTESIS  2022    IR THORACENTESIS  2020    IR THORACENTESIS  08/10/2020    IR TRANSCATHETER BIOPSY  2022    IR TRANSCATHETER BIOPSY  2024    REVISION FISTULA ARTERIOVENOUS UPPER EXTREMITY Left 8/15/2023    Procedure: 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 2; OUTFLOW REVISION CEPHALIC TO BRACHIAL VEIN;  Surgeon: Deejay Mcneil MD;  Location:  OR    REVISION FISTULA ARTERIOVENOUS UPPER EXTREMITY Left 1/3/2024    Procedure: Excision and repair of LEFT brachiocephalic arteriovenous fistula and vein patch angioplasty;  Surgeon: Deejay Mcneil MD;  Location:  OR    TRACHEOSTOMY N/A 2022    Procedure: TRACHEOSTOMY;  Surgeon: Nilesh Jackson MD;  Location:  OR    TRANSPLANT LIVER RECIPIENT  DONOR N/A 2016    Procedure: TRANSPLANT LIVER  RECIPIENT  DONOR;  Surgeon: Enoc Crews MD;  Location: [facility] OR       Physical Exam     Patient Vitals for the past 24 hrs:   BP Temp Temp src Pulse Resp SpO2   25 100/66 98  F (36.7  C) Oral 81 18 96 %   25 -- -- -- 90 -- --   25 106/67 98.5  F (36.9  C) Temporal -- 18 98 %     Physical Exam  Eyes:  Sclera white; Pupils are equal and round  ENT:    External ears and nares normal  CV:  Rate as above with regular rhythm     LUE fistula with distal pulse felt but no thrill    No BLE edema  Resp:  Breath sounds clear and equal bilaterally, no crackles appreciated    Non-labored, no retractions or accessory muscle use  MS:  Moves all extremities  Skin:  Warm and dry  Neuro:  Speech is normal and fluent. No apparent deficit.      Diagnostics     Lab Results   Labs Ordered and Resulted from Time of ED Arrival to Time of ED Departure   BASIC METABOLIC PANEL - Abnormal       Result Value    Sodium 128 (*)     Potassium 4.8      Chloride 91 (*)     Carbon Dioxide (CO2) 21 (*)     Anion Gap 16 (*)     Urea Nitrogen 60.4 (*)     Creatinine 10.37 (*)     GFR Estimate 5 (*)     Calcium 8.6 (*)     Glucose 106 (*)    PHOSPHORUS - Abnormal    Phosphorus 5.5 (*)    NT-PROBNP - Abnormal    NT-proBNP 4,341 (*)    TROPONIN T, HIGH SENSITIVITY - Abnormal    Troponin T, High Sensitivity 48 (*)    CBC WITH PLATELETS AND DIFFERENTIAL - Abnormal    WBC Count 4.1      RBC Count 2.78 (*)     Hemoglobin 9.4 (*)     Hematocrit 27.6 (*)     MCV 99      MCH 33.8 (*)     MCHC 34.1      RDW 14.1      Platelet Count 77 (*)     % Neutrophils 67      % Lymphocytes 21      % Monocytes 10      % Eosinophils 2      % Basophils 1      % Immature Granulocytes 1      NRBCs per 100 WBC 0      Absolute Neutrophils 2.7      Absolute Lymphocytes 0.8      Absolute Monocytes 0.4      Absolute Eosinophils 0.1      Absolute Basophils 0.0      Absolute Immature Granulocytes 0.0      Absolute NRBCs 0.0     TROPONIN T,  HIGH SENSITIVITY - Abnormal    Troponin T, High Sensitivity 47 (*)    MAGNESIUM - Normal    Magnesium 2.2         Imaging   Chest XR,  PA & LAT   Final Result   IMPRESSION: Heart size upper limits of normal. Normal pulmonary vascularity. There is some focal atelectasis in the right lung base. Probable small right effusion. Left lung clear.          EKG   ECG results from 06/22/25   EKG 12-lead, tracing only     Value    Systolic Blood Pressure     Diastolic Blood Pressure     Ventricular Rate 83    Atrial Rate 83    CO Interval 192    QRS Duration 66        QTc 441    P Axis 47    R AXIS 40    T Axis 44    Interpretation ECG      Sinus rhythm  Normal ECG  When compared with ECG of 22-Feb-2024 09:27,  No significant change was found  Confirmed by GENERATED REPORT, COMPUTER ([id number]),  Adriel Wells ([id number]) on 6/22/2025 8:23:55 PM       *Note: Due to a large number of results and/or encounters for the requested time period, some results have not been displayed. A complete set of results can be found in Results Review.      Similar when compared with 2/22/24 - per myself 8:19 PM     Independent Interpretation   CXR: No pneumothorax, infiltrate, pleural effusion, or pulmonary edema.    ED Course      Medications Administered   Medications   senna-docusate (SENOKOT-S/PERICOLACE) 8.6-50 MG per tablet 1 tablet (has no administration in time range)     Or   senna-docusate (SENOKOT-S/PERICOLACE) 8.6-50 MG per tablet 2 tablet (has no administration in time range)   ondansetron (ZOFRAN ODT) ODT tab 4 mg (has no administration in time range)     Or   ondansetron (ZOFRAN) injection 4 mg (has no administration in time range)   prochlorperazine (COMPAZINE) injection 10 mg (has no administration in time range)     Or   prochlorperazine (COMPAZINE) tablet 10 mg (has no administration in time range)   Patient is already receiving anticoagulation with heparin, enoxaparin (LOVENOX), warfarin (COUMADIN)  or other  anticoagulant medication (has no administration in time range)   acetaminophen (TYLENOL) tablet 650 mg (has no administration in time range)     Or   acetaminophen (TYLENOL) Suppository 650 mg (has no administration in time range)   oxyCODONE (ROXICODONE) tablet 5 mg (5 mg Oral $Given 6/22/25 2216)       Procedures   Procedures     Discussion of Management   Per ED course     ED Course   ED Course as of 06/22/25 2339   Sun Jun 22, 2025 2131 I spoke with Dr. Fink, familiar with the patient from the attempts to schedule him on Friday.  Recommended admission, NPO at midnight, IR consult for fistulogram and dialysis tomorrow.   2209 Updated on results.  Chronically on oxycodone which I will order for him   2242 I spoke with Dr Fuentes, hospitalist, regarding admission       Additional Documentation  None    Medical Decision Making / Diagnosis     CMS Diagnoses: None    MIPS   None     MDM   Blanco Osborne is a 60 year old male who had to miss a dialysis session coming in with symptoms of potential volume overload.  He has the potential for electrolyte derangements as well.  Fortunately EKG and labs do not reflect any severe electrolyte derangements that would require emergent dialysis.  He presented also with some chest pain but with flat serial troponins and a normal ECG, there is no evidence for ACS or myocarditis.  Ultimately there is no pulmonary edema visible on chest x-ray and he is not hypoxic or in any respiratory distress.  Case was discussed with nephrology.  Since fistulogram has not been able to be scheduled yet and he clearly needs access and dialysis in the morning, plan for admission tonight, n.p.o. at midnight, fistulogram in the morning followed by dialysis.    Disposition   The patient was admitted to the hospital.     Diagnosis     ICD-10-CM    1. Shortness of breath  R06.02       2. Clotted dialysis access, initial encounter  T82.49XA       3. Dialysis patient  Z99.2       4. Chest pain,  unspecified type  R07.9             Shirley Shrestha MD  06/22/25 [id number]

## 2025-06-23 NOTE — PHARMACY-ADMISSION MEDICATION HISTORY
Pharmacy Intern Admission Medication History    Admission medication history is complete. The information provided in this note is only as accurate as the sources available at the time of the update.    Information Source(s): Patient and CareEverywhere/SureScripts via in-person    Pertinent Information: Patient confirmed current medication list. Noted he is not currently taking warfarin due to his upcoming surgery. Also mentioned he is taking 40 mg midodrine on dialysis days to prevent low blood pressure. He is due for dialysis today.      Changes made to PTA medication list:  Added: None  Deleted: None  Changed: Midodrine (take 1 tablet 4 times daily)     Allergies reviewed with patient and updates made in EHR: yes    Medication History Completed By: Jana Hammond 6/23/2025 8:00 AM    PTA Med List   Medication Sig Note Last Dose/Taking    acetaminophen (TYLENOL) 325 MG tablet Take 2 tablets (650 mg) by mouth every 8 hours as needed for other (For optimal non-opioid multimodal pain management to improve pain control.)  Taking As Needed    aspirin 81 MG EC tablet Take 1 tablet (81 mg) by mouth daily  6/22/2025    gabapentin (NEURONTIN) 100 MG capsule TAKE 3 CAPSULES(300 MG) BY MOUTH AT BEDTIME  6/22/2025    hydrOXYzine HCl (ATARAX) 25 MG tablet Take 1 tablet (25 mg) by mouth 3 times daily as needed for itching.  Taking As Needed    lacosamide (VIMPAT) 50 MG TABS tablet Take 1 tablet (50 mg) by mouth 2 times daily.  6/22/2025    melatonin 3 MG tablet Take 1 tablet (3 mg) by mouth At Bedtime  Taking    midodrine (PROAMATINE) 10 MG tablet TAKE 1 TABLET 3X DAILY. ON DAILYSIS DAYS(M,W,F) TAKE 2 EXTRA TABLETS 1 HOUR BEFORE,1 TABLET WHEN YOU ARRIVE, AND 1 TABLET DURING DIALYSIS. 6/23/2025: Patient taking 40 mg before dialysis 6/22/2025    multivitamin RENAL (TRIPHROCAPS) 1 capsule capsule TAKE 1 CAPSULE BY MOUTH DAILY  6/22/2025    omeprazole (PRILOSEC) 40 MG DR capsule TAKE 1 CAPSULE(40 MG) BY MOUTH DAILY  Taking    oxyCODONE  (ROXICODONE) 5 MG tablet Take 1 tablet (5 mg) by mouth every 6 hours as needed for pain.  Taking As Needed    rOPINIRole (REQUIP) 0.5 MG tablet TAKE 1 TABLET(0.5 MG) BY MOUTH TWICE DAILY  Taking    sevelamer carbonate (RENVELA) 800 MG tablet Take 1 tablet (800 mg) by mouth 4 times daily With meals and snacks (Patient taking differently: Take 800 mg by mouth 4 times daily as needed. With meals and snacks)  Taking Differently    tacrolimus (GENERIC EQUIVALENT) 1 MG capsule TAKE 1 CAPSULE BY MOUTH EVERY 12 HOURS  6/22/2025    traZODone (DESYREL) 50 MG tablet Take 1 tablet (50 mg) by mouth nightly as needed for sleep.  6/22/2025    warfarin ANTICOAGULANT (COUMADIN) 2 MG tablet 8 mg Mon, Fri; 6 mg all other days or as directed by INR clinic 6/23/2025: Holding for upcoming surgery.  Taking

## 2025-06-23 NOTE — PROGRESS NOTES
A&OX4, VSS on RA. Just arrived to the unit and taken to IR for fistulogram  and dialysis after. Was up ind. Nephrology and IR following. Continue to monitor.

## 2025-06-23 NOTE — IR NOTE
Procedure Note for IR Procedure Dictation  Procedure CVC (sedation meds listed under Fistulogram  Conscious sedation: 0 mg IVP Versed, 0 mcg IVP fentanyl  Sedation time: 0 minutes  Fluoro time: 0.9 minutes  Total Fluoro Dose: 3.64 mGy (Air Kerma)  Contrast: 0 ml 0  Local anesthetic: 12 ml 1% lidocaine

## 2025-06-23 NOTE — IR NOTE
Procedure Note for IR Procedure Dictation  Procedure Dialysis Fistulogram  Conscious sedation: 2 mg IVP Versed, 125 mcg IVP fentanyl  Sedation time: 97 minutes  Fluoro time: 15.6 minutes  Total Fluoro Dose: 23.12 mGy (Air Kerma)  Contrast: 60 ml isovue  Local anesthetic: 2 ml 1% lidocaine

## 2025-06-23 NOTE — PROGRESS NOTES
[facility]    Medicine Progress Note - Hospitalist Service    Date of Admission:  6/22/2025    Assessment & Plan      Blanco Osborne is a 60 year old male with history of ESRD on dialysis MWF, s/p liver transplant, hypertension, hyperlipidemia, GERD, CHRISTIAN on CPAP, RLS, and seizures among others who presented to the ED with complaints of mild shortness of breath.  Patient missed his last planned dialysis run this past Friday due to his fistula not working and noted concern of clot.  Thrill is not palpable in ED.  He has noted some dull chest discomfort and has felt winded with activity in recent days.  He denies any new edema in his legs but does feel some fullness in his abdomen.  He follows with Dr. Bradshaw-he was unable to get IR scheduled for fistulogram on Friday / over the weekend.     Possible clot in dialysis fistula  Missed dialysis  ESRD on HD MWF - follows with Dr Bradshaw  Patient was out of town missed last HD run on Friday-unable to get HD run as fistula was not functioning when there was concern of clot.  No thrill noted on exam upon admission but distal pulse in LUE is noted.  Some shortness of breath with activity and has had mild dull chest discomfort, but trop is flat, and EKG without overt acute ischemic changes. VSS in ED. No new edema in legs but feels he is retaining fluid in abdomen.   CXR fairly bland (see below).   Stable on RA. K WNL at 4.8.   *nephro contacted by ED MD - plan for NPO, fistulogram 6/23 and HD subsequently  - nephrology consultation - will need HD 6/23  - IR consultation for fistulagram which is anticipated later this afternoon  - npo   - continue work up with [place] for kidney transplant list  - hold pta warfarin - he was holding it himself for past several days as he was out of town fishing/camping? Plan to resume post procedure pending findings    Hx of liver transplant 2016 - continue PTA tacrolimus     CHRISTIAN on CPAP   GERD - PTA PPI   RLS - PTA  ropinirole   Seizures - continue PTA vimpat   Hx of atrial fib with RVR (reportedly one brief episode when hospitalized previously) - has been on warfarin but seems to stop it on his own periodically; he reports [place] transplant team has considered discontinuing his AC. Would recommend continuing regimen and follow up with outpatient team for ongoing mgmt / consideration of discontinuing etc.     Diet:  npo   DVT Prophylaxis: Pneumatic Compression Devices, will resume warfarin post procedure pending findings   Nixon Catheter: Not present  Lines: PRESENT             Cardiac Monitoring: None  Code Status:  Full Code          Diet: NPO for Medical/Clinical Reasons Except for: Ice Chips    DVT Prophylaxis: Pneumatic Compression Devices  Nixon Catheter: Not present  Lines: PRESENT             Cardiac Monitoring: ACTIVE order. Indication: Chest pain/ ACS rule out (24 hours)  Code Status: Full Code      Clinically Significant Risk Factors Present on Admission         # Hyponatremia: Lowest Na = 128 mmol/L in last 2 days, will monitor as appropriate  # Hypochloremia: Lowest Cl = 91 mmol/L in last 2 days, will monitor as appropriate       # Drug Induced Coagulation Defect: home medication list includes an anticoagulant medication  # Drug Induced Platelet Defect: home medication list includes an antiplatelet medication   # Hypertension: Noted on problem list      # Anemia: based on hgb <11           # Financial/Environmental Concerns:           Social Drivers of Health    Alcohol Use: Unknown (10/7/2019)    AUDIT-C     Frequency of Alcohol Consumption: Monthly or less   Physical Activity: Insufficiently Active (4/18/2025)    Received from [facility]    Exercise Vital Sign     Days of Exercise per Week: 2 days     Minutes of Exercise per Session: 10 min          Disposition Plan     Medically Ready for Discharge: Anticipated Tomorrow       The patient's care was discussed with Dr. Mary who agrees with the above plan      Lindsay Archibald PA-C  Hospitalist Service  [facility]  Securely message with [employer] (more info)  Text page via [employer] Paging/Directory   ______________________________________________________________________    Interval History   Doing well this am overall though notes increasing SOB. Chest is a little tight at times but feels this may be due to fluid in belly. No fever, chills. No nausea. Denies LE edema but again feels he has retained a lot of fluid in abdomen. Last dialysis 6 days PTA.     Physical Exam   Temp: 98  F (36.7  C) Temp src: Oral BP: 101/70 Pulse: 75   Resp: 18 SpO2: 98 % O2 Device: None (Room air)    There were no vitals filed for this visit.  Vital Signs with Ranges  Temp:  [98  F (36.7  C)-98.5  F (36.9  C)] 98  F (36.7  C)  Pulse:  [75-90] 75  Resp:  [18] 18  BP: (100-106)/(66-70) 101/70  SpO2:  [96 %-98 %] 98 %  No intake/output data recorded.    Constitutional: Alert and oriented, sitting up in bed. Appears comfortable and is appropriately conversant   ENT: moist mucous membranes  Respiratory: Lungs with fine crackles in bases. No increased WOB   Cardiovascular: Regular rate and rhythm, no significant LE edema   GI: active bowel sounds, abdomen soft, moderately distended. Non tender   MSK: moves all four extremities.   Neuro: speech is clear. Face symmetric. Follows commands   Extremities: LUE with no palpable thrill       Medical Decision Making       45 MINUTES SPENT BY ME on the date of service doing chart review, history, exam, documentation & further activities per the note.      Data     I have personally reviewed the following data over the past 24 hrs:    4.1  \   9.4 (L)   / 77 (L)     133 (L) 96 (L) 66.2 (H) /  98   4.6 21 (L) 10.79 (H) \     Trop: 47 (H) BNP: 4,341 (H)     INR:  1.31 (H) PTT:  N/A   D-dimer:  N/A Fibrinogen:  N/A       Imaging results reviewed over the past 24 hrs:   Recent Results (from the past 24 hours)   Chest XR,  PA & LAT     Narrative    EXAM: XR CHEST 2 VIEWS  LOCATION: [facility]  DATE: 6/22/2025    INDICATION: missed dialysis Friday, short of breath  COMPARISON: None.      Impression    IMPRESSION: Heart size upper limits of normal. Normal pulmonary vascularity. There is some focal atelectasis in the right lung base. Probable small right effusion. Left lung clear.

## 2025-06-23 NOTE — H&P
[facility]    History and Physical - Hospitalist Service       Date of Admission:  6/22/2025    Assessment & Plan      Blanco Osborne is a 60 year old male with history of ESRD on dialysis MWF, hypertension, hyperlipidemia, GERD, CHRISTIAN on CPAP, RLS, and seizures among others who presented to the ED with complaints of mild shortness of breath.  Patient missed his last planned dialysis run this past Friday due to his fistula not working and noted concern of clot.  Thrill is not palpable in ED.  He has noted some dull chest discomfort and has felt winded with activity in recent days.  He denies any new edema in his legs but does feel some fullness in his abdomen.  He follows with Dr. Bradshaw-he was unable to get IR scheduled for fistulogram on Friday / over the weekend.       Possible clot in dialysis fistula  Missed dialysis  ESRD on HD MWF - follows with Dr Bradshaw  Patient was out of town missed last HD run on Friday-unable to get HD run as fistula was not functioning when there was concern of clot.  No thrill noted on exam upon admission but distal pulse in LUE is noted.  Some shortness of breath with activity and has had mild dull chest discomfort, but trop is flat, and EKG without overt acute ischemic changes. VSS in ED. No new edema in legs. CXR fairly bland (see below). Stable on RA. K WNL at 4.8.   *nephro contacted by ED MD - plan for NPO midnight, fistulogram in AM and HD subsequently  - observation  - nephrology consultation - will need HD 6/23  - IR consultation for fistulagram  - npo midnight  - BMP in AM  - continue work up with [place] for kidney transplant list  - hold pta warfarin - he was holding it himself for past several days as he was out of town fishing/camping? (See below)      Hx of liver transplant - noted, continue follow up with specialist  CHRISTIAN on CPAP   GERD - PTA PPI   RLS - PTA ropinirole to continue when verified  Seizures - continue PTA vimpat when verified  Hx of  atrial fib with RVR (reportedly one brief episode when hospitalized previously) - has been on warfarin but seems to stop it on his own periodically; he reports [place] transplant team has considered discontinuing his AC. Would recommend continuing regimen and follow up with outpatient team for ongoing mgmt / consideration of discontinuing etc.             Diet:  npo midnight  DVT Prophylaxis: Pneumatic Compression Devices  Nixon Catheter: Not present  Lines: PRESENT             Cardiac Monitoring: None  Code Status:  Full Code    Clinically Significant Risk Factors Present on Admission         # Hyponatremia: Lowest Na = 128 mmol/L in last 2 days, will monitor as appropriate  # Hypochloremia: Lowest Cl = 91 mmol/L in last 2 days, will monitor as appropriate       # Drug Induced Coagulation Defect: home medication list includes an anticoagulant medication  # Drug Induced Platelet Defect: home medication list includes an antiplatelet medication   # Hypertension: Noted on problem list      # Anemia: based on hgb <11           # Financial/Environmental Concerns:           Disposition Plan     Medically Ready for Discharge: Anticipated Tomorrow           Arpit Fuentes MD  Hospitalist Service  [facility]  Securely message with [employer] (more info)  Text page via [employer] Paging/Directory     ______________________________________________________________________    Chief Complaint   Missed dialysis, concern of clotted LUE fistula    History is obtained from the patient    History of Present Illness   Blanco Osborne is a 60 year old male with history of ESRD on dialysis MWF, hypertension, hyperlipidemia, GERD, CHRISTIAN on CPAP, RLS, and seizures among others who presented to the ED with complaints of shortness of breath.  Patient missed his last planned dialysis run this past Friday due to his fistula not working and noted concern of clot.  Thrill is not palpable in ED.  He has noted some dull chest  discomfort and has felt winded with activity in recent days.  He denies any new edema in his legs.  He follows with Dr. Bradshaw-he was unable to get IR scheduled for fistulogram given the weekend.    He otherwise denies fevers, chills, cough, abdominal pain, nausea, vomiting, or diarrhea.    Past Medical History    Past Medical History:   Diagnosis Date    Acquired immunocompromised state 11/19/2022    Acute renal failure, unspecified acute renal failure type 11/12/2022    Antiplatelet or antithrombotic long-term use     Arrhythmia     PEA    Ascites     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    Cancer (H) 2023    Squamous Cell Cancer- Since resolved    Cirrhosis of liver with ascites (H) 02/11/2016    Coagulopathy     Critical illness myopathy     Dialysis patient     DIALYSIS 3X/ WEEK    Embolic stroke (H) 11/20/2022    Encephalopathy     ESRD (end stage renal disease) on dialysis (H)     Gastroesophageal reflux disease     H/O alcohol abuse     History of blood transfusion     Hyperammonemia     Hyperlipidemia     Hypertension     Patients states that HTN has been resolved    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    Insomnia     Lactic acidosis 11/12/2022    Liver replaced by transplant (H) 09/20/2016    NAFLD (nonalcoholic fatty liver disease) 02/11/2016    CHRISTIAN on CPAP     Parainfluenza type 1 infection 11/19/2022    Parapneumonic effusion     Pleural effusion     Pneumothorax on left 12/16/2022    Respiratory acidosis 11/12/2022    Respiratory arrest (H) 11/12/2022    Restless legs syndrome (RLS)     SBP (spontaneous bacterial peritonitis) (H)     MNGI    Seizures (H) 12/2022    Sepsis due to Streptococcus pneumoniae with acute hypoxic respiratory failure (H) 11/19/2022    Stroke, embolic (H) 11/20/2022    Sudden cardiac arrest (H) 12/29/2022    PEA- 2 min of CPR    Tubular adenoma 10/2019    Large cecal adenoma- due for surveillance colonoscopy in 3 years (10/2022)       Past Surgical History   Past  Surgical History:   Procedure Laterality Date    APPENDECTOMY      BENCH LIVER N/A 09/20/2016    Procedure: BENCH LIVER;  Surgeon: Enoc Crews MD;  Location: [facility] OR    BRONCHOSCOPY FLEXIBLE AND RIGID N/A 12/20/2022    Procedure: BRONCHOSCOPY;  Surgeon: Alena Valenzuela MD;  Location:  GI    COLONOSCOPY  08/06/2013    repeat in 2018    COLONOSCOPY N/A 10/04/2019    Procedure: COLONOSCOPY, WITH POLYPECTOMY AND BIOPSY;  Surgeon: Go Chong MD;  Location: [facility] OR    COLONOSCOPY N/A 3/26/2024    Procedure: COLONOSCOPY, FLEXIBLE, WITH LESION REMOVAL USING SNARE;  Surgeon: Jie Douglas MD;  Location:  GI    CREATE FISTULA ARTERIOVENOUS UPPER EXTREMITY Left 03/21/2023    Procedure: LEFT UPPER EXTREMITY ARTERIOVENOUS FISTULA CREATION. LIGATION OF COMPETING VASCULAR BRANCHES- LEFT;  Surgeon: Deejay Mcneil MD;  Location:  OR    CV CORONARY ANGIOGRAM N/A 2/22/2024    Procedure: Coronary Angiogram;  Surgeon: Nima Dimas MD;  Location:  HEART CARDIAC CATH LAB    CV PERICARDIOCENTESIS N/A 9/29/2023    Procedure: Pericardiocentesis;  Surgeon: Barry Mckeon MD;  Location:  HEART CARDIAC CATH LAB    CV PERICARDIOCENTESIS N/A 10/11/2023    Procedure: Pericardiocentesis;  Surgeon: Ulises Bhakta MD;  Location:  HEART CARDIAC CATH LAB    CV RIGHT HEART CATH MEASUREMENTS RECORDED N/A 10/11/2023    Procedure: Right Heart Catheterization;  Surgeon: Ulises Bhakta MD;  Location:  HEART CARDIAC CATH LAB    HERNIA REPAIR      IR CHEST TUBE PLACEMENT NON-TUNNELED RIGHT  12/12/2022    IR CVC TUNNEL PLACEMENT > 5 YRS OF AGE  12/06/2022    IR CVC TUNNEL PLACEMENT > 5 YRS OF AGE  1/3/2024    IR DIALYSIS FISTULOGRAM LEFT  07/13/2023    IR GASTROSTOMY TUBE CHANGE  02/08/2023    IR GASTROSTOMY TUBE PERCUTANEOUS PLCMNT  11/29/2022    IR PARACENTESIS  11/29/2022    IR PARACENTESIS  12/01/2022    IR PARACENTESIS  2/10/2016    IR PARACENTESIS  2/28/2016    IR  THORACENTESIS  2022    IR THORACENTESIS  2020    IR THORACENTESIS  08/10/2020    IR TRANSCATHETER BIOPSY  2022    IR TRANSCATHETER BIOPSY  2024    REVISION FISTULA ARTERIOVENOUS UPPER EXTREMITY Left 8/15/2023    Procedure: 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 2; OUTFLOW REVISION CEPHALIC TO BRACHIAL VEIN;  Surgeon: Deejay Mcneil MD;  Location: [facility] OR    REVISION FISTULA ARTERIOVENOUS UPPER EXTREMITY Left 1/3/2024    Procedure: Excision and repair of LEFT brachiocephalic arteriovenous fistula and vein patch angioplasty;  Surgeon: Deejay Mcneil MD;  Location: [facility] OR    TRACHEOSTOMY N/A 2022    Procedure: TRACHEOSTOMY;  Surgeon: Nilesh Jackson MD;  Location: [facility] OR    TRANSPLANT LIVER RECIPIENT  DONOR N/A 2016    Procedure: TRANSPLANT LIVER RECIPIENT  DONOR;  Surgeon: Enoc Crews MD;  Location: [facility] OR       Prior to Admission Medications   Prior to Admission Medications   Prescriptions Last Dose Informant Patient Reported? Taking?   acetaminophen (TYLENOL) 325 MG tablet   Yes No   Sig: Take 2 tablets (650 mg) by mouth every 8 hours as needed for other (For optimal non-opioid multimodal pain management to improve pain control.)   aspirin 81 MG EC tablet   No No   Sig: Take 1 tablet (81 mg) by mouth daily   gabapentin (NEURONTIN) 100 MG capsule   No No   Sig: TAKE 3 CAPSULES(300 MG) BY MOUTH AT BEDTIME   hydrOXYzine HCl (ATARAX) 25 MG tablet   No No   Sig: Take 1 tablet (25 mg) by mouth 3 times daily as needed for itching.   lacosamide (VIMPAT) 50 MG TABS tablet   No No   Sig: Take 1 tablet (50 mg) by mouth 2 times daily.   melatonin 3 MG tablet  Spouse/Significant Other No No   Sig: Take 1 tablet (3 mg) by mouth At Bedtime   midodrine (PROAMATINE) 10 MG tablet   No No   Sig: TAKE 1 TABLET 3X DAILY. ON DAILYSIS DAYS(M,W,) TAKE 2 EXTRA TABLETS 1 HOUR BEFORE,1 TABLET WHEN YOU ARRIVE, AND 1 TABLET DURING DIALYSIS.   multivitamin  RENAL (TRIPHROCAPS) 1 capsule capsule   No No   Sig: TAKE 1 CAPSULE BY MOUTH DAILY   omeprazole (PRILOSEC) 40 MG DR capsule   No No   Sig: TAKE 1 CAPSULE(40 MG) BY MOUTH DAILY   oxyCODONE (ROXICODONE) 5 MG tablet   No No   Sig: Take 1 tablet (5 mg) by mouth every 6 hours as needed for pain.   rOPINIRole (REQUIP) 0.5 MG tablet   No No   Sig: TAKE 1 TABLET(0.5 MG) BY MOUTH TWICE DAILY   sevelamer carbonate (RENVELA) 800 MG tablet   No No   Sig: Take 1 tablet (800 mg) by mouth 4 times daily With meals and snacks   tacrolimus (GENERIC EQUIVALENT) 1 MG capsule   No No   Sig: TAKE 1 CAPSULE BY MOUTH EVERY 12 HOURS   traZODone (DESYREL) 50 MG tablet   No No   Sig: Take 1 tablet (50 mg) by mouth nightly as needed for sleep.   warfarin ANTICOAGULANT (COUMADIN) 2 MG tablet   No No   Si mg Mon, Fri; 6 mg all other days or as directed by INR clinic      Facility-Administered Medications: None        Review of Systems    The 10 point Review of Systems is negative other than noted in the HPI or here.      Physical Exam   Vital Signs: Temp: 98  F (36.7  C) Temp src: Oral BP: 100/66 Pulse: 81   Resp: 18 SpO2: 96 % O2 Device: None (Room air)    Weight: 0 lbs 0 oz    Gen: NAD, pleasant  HEENT: EOMI, MMM  Resp: no focal crackles, no wheezes, no increased work of resp  CV: S1S2 heard, reg rhythm, reg rate  Abdo: soft, nontender, full, bowel sounds present  Ext: calves nontender, well perfused  Neuro: aa, conversing, moving ext, CN grossly intact, no facial asymmetry      Medical Decision Making       71 MINUTES SPENT BY ME on the date of service doing chart review, history, exam, documentation & further activities per the note.      Data     I have personally reviewed the following data over the past 24 hrs:    4.1  \   9.4 (L)   / 77 (L)     128 (L) 91 (L) 60.4 (H) /  106 (H)   4.8 21 (L) 10.37 (H) \     Trop: 47 (H) BNP: 4,341 (H)       Imaging results reviewed over the past 24 hrs:   Recent Results (from the past 24 hours)    Chest XR,  PA & LAT    Narrative    EXAM: XR CHEST 2 VIEWS  LOCATION: [facility]  DATE: 6/22/2025    INDICATION: missed dialysis Friday, short of breath  COMPARISON: None.      Impression    IMPRESSION: Heart size upper limits of normal. Normal pulmonary vascularity. There is some focal atelectasis in the right lung base. Probable small right effusion. Left lung clear.

## 2025-06-23 NOTE — PROCEDURES
[facility]    Procedure: IR Procedure Note    Date/Time: 6/23/2025 5:43 PM    Performed by: Héctor Mckinnon MD  Authorized by: Héctor Mckinnon MD  IR Fellow Physician:    Pre Procedure Diagnosis: ESRD, fistula thrombosed  Post Procedure Diagnosis: same    UNIVERSAL PROTOCOL   Site Marked: NA  Prior Images Obtained and Reviewed:  Yes  Required items: Required blood products, implants, devices and special equipment available    Patient identity confirmed:  Verbally with patient and arm band  Patient was reevaluated immediately before administering moderate or deep sedation or anesthesia  Confirmation Checklist:  Patient's identity using two indicators and relevant allergies  Time out: Immediately prior to the procedure a time out was called    Universal Protocol: the Joint Commission Universal Protocol was followed    Preparation: Patient was prepped and draped in usual sterile fashion      SEDATION  Patient Sedated: Yes    Vital signs: Vital signs monitored during sedation    Findings: Unsuccessful attempt to declot the aneurysmal LUE AV fistula with TPA/angiojet/balloon angioplasty.     23 cm tunneled dialysis catheter placed via right internal jugular. Catheter tip is in the proximal right atrium. Catheter is ready for immediate use.     Moderate sized hematoma around the elbow/proximal forearm from bleeding following dialysis access sheath removal. Hemostasis was achieved with prolonged manual pressure. Hematoma was verified to be stable for >15 minutes at the conclusion of the procedure. Patient had pain at the hematoma site but no hand symptoms.       PROCEDURE    Length of time physician/provider present for 1:1 monitoring during sedation:  53-67 min
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
## 2025-06-23 NOTE — IR NOTE
Interventional Radiology Intra-procedural Nursing Note    Patient Name: Blanco Osborne  Medical Record Number: [id number]  Today's Date: June 23, 2025    Procedure: Left upper arm fistula declot with IV moderate sedation.     Start time: 1541  End time: 1718  Report provided to: Akbar Samson RN      Note: Patient entered Interventional Radiology Suite number 2 via cart. Patient awake, alert and oriented. Assisted onto procedural table in Supine position. Prepped and draped.  Dr. Mckinnon in room. Time out and procedure started. Vital signs stable. Telemetry reading NSR-SB at times .    Procedure well tolerated by patient without complications. Procedure end with debrief by Dr. Mckinnon.  Pressure held until hemostasis achieved. new dressing applied to left fistula and cvc.    Administered medication totals:  Lidocaine 1% 2 mL Intradermal    Heparin 54272 Units IV and to hep cvc   Versed 2 mg IVP  Fentanyl 125 mcg IVP  Alteplase 6mg, IA via MD     Last dose of sedation administered at 1653.

## 2025-06-23 NOTE — ED NOTES
[facility]  ED Nurse Handoff Report    ED Chief complaint: Shortness of Breath      ED Diagnosis:   Final diagnoses:   Shortness of breath   Clotted dialysis access, initial encounter   Dialysis patient   Chest pain, unspecified type       Code Status: Full Code    Allergies: No Known Allergies    Patient Story: Blanco Osborne is a 60 year old male with history of ESRD on dialysis MWF, hypertension, hyperlipidemia, GERD, CHRISTIAN on CPAP, RLS, and seizures among others who presented to the ED with complaints of shortness of breath.  Patient missed his last planned dialysis run this past Friday due to his fistula not working and noted concern of clot.  Thrill is not palpable in ED.  He has noted some dull chest discomfort and has felt winded with activity in recent days.  He denies any new edema in his legs.  He follows with Dr. Bradshaw-he was unable to get IR scheduled for fistulogram given the weekend.   Focused Assessment:  Pt is alert and oriented. Reports shortness of breath with activity, lungs clear on room air. Abdomen is full and tender.     Treatments and/or interventions provided: Chest xray, labs, IV, EKG   Chest XR,  PA & LAT   Final Result   IMPRESSION: Heart size upper limits of normal. Normal pulmonary vascularity. There is some focal atelectasis in the right lung base. Probable small right effusion. Left lung clear.         Labs Ordered and Resulted from Time of ED Arrival to Time of ED Departure   BASIC METABOLIC PANEL - Abnormal       Result Value    Sodium 128 (*)     Potassium 4.8      Chloride 91 (*)     Carbon Dioxide (CO2) 21 (*)     Anion Gap 16 (*)     Urea Nitrogen 60.4 (*)     Creatinine 10.37 (*)     GFR Estimate 5 (*)     Calcium 8.6 (*)     Glucose 106 (*)    PHOSPHORUS - Abnormal    Phosphorus 5.5 (*)    NT-PROBNP - Abnormal    NT-proBNP 4,341 (*)    TROPONIN T, HIGH SENSITIVITY - Abnormal    Troponin T, High Sensitivity 48 (*)    CBC WITH PLATELETS AND DIFFERENTIAL -  Abnormal    WBC Count 4.1      RBC Count 2.78 (*)     Hemoglobin 9.4 (*)     Hematocrit 27.6 (*)     MCV 99      MCH 33.8 (*)     MCHC 34.1      RDW 14.1      Platelet Count 77 (*)     % Neutrophils 67      % Lymphocytes 21      % Monocytes 10      % Eosinophils 2      % Basophils 1      % Immature Granulocytes 1      NRBCs per 100 WBC 0      Absolute Neutrophils 2.7      Absolute Lymphocytes 0.8      Absolute Monocytes 0.4      Absolute Eosinophils 0.1      Absolute Basophils 0.0      Absolute Immature Granulocytes 0.0      Absolute NRBCs 0.0     TROPONIN T, HIGH SENSITIVITY - Abnormal    Troponin T, High Sensitivity 47 (*)    MAGNESIUM - Normal    Magnesium 2.2          Patient's response to treatments and/or interventions: Pt reports constant baseline pain, improved with home dose oxy medication.    To be done/followed up on inpatient unit:  Npo at midnight, IR consultation and Nephrology consultation. Plan for fistulogram in AM and HD.     Does this patient have any cognitive concerns?: None    Activity level - Baseline/Home:  Independent  Activity Level - Current:   Stand with Assist    Patient's Preferred language: English   Needed?: No    Isolation: None  Infection: Not Applicable  Sepsis treatment initiated: No  Patient tested for COVID 19 prior to admission: NO  Bariatric?: No    Vital Signs:   Vitals:    06/22/25 1932 06/22/25 1934 06/22/25 1945   BP: 106/67  100/66   BP Location:   Right arm   Pulse:  90 81   Resp: 18  18   Temp: 98.5  F (36.9  C)  98  F (36.7  C)   TempSrc: Temporal  Oral   SpO2: 98%  96%       Cardiac Rhythm:     Was the PSS-3 completed:   Yes  What interventions are required if any?               Family Comments: None  OBS brochure/video discussed/provided to patient/family: Yes              Name of person given brochure if not patient:               Relationship to patient:     For the majority of the shift this patient's behavior was Green.   Behavioral interventions  performed were none    ED NURSE PHONE NUMBER: [phone]

## 2025-06-23 NOTE — CONSULTS
[facility]    Blanco Osborne has been dialyzing via left upper arm proximal radial to cephalic fistula which was created on 3/21 /2023.  He has undergone 2 revisions for hematomas and pseudoaneurysms.  Last procedure was on 1/3/2024.  Was seen in the clinic on 10/10/2024 where the fistula was working well.  Did notice a stenosis at 2.3 mm in the proximal one third of the upper arm which was very focal but the fistula was working well with an outflow volume of 2600 mL/min.  Mild aneurysmal dilatation in the distal upper arm at 13 mm    Patient presented today with a clotted fistula.  He has been taken to interventional radiology for Dr. Dillon has been trying to open the fistula lipolysis and angioplasty.  However large amount of thrombus is noted in the aneurysms and unable to successfully keep the fistula open.  The more proximal cephalic vein has a large sidebranch in the upper arm that is keeping it patent and this was marked in the skin.  Anastomosis itself was widely patent as was the brachial and ulnar arteries    Tunneled catheter will be placed for dialysis.  Will see if we can take the patient tomorrow in the operating room tomorrow dialysis for revision of the fistula to help salvage this left upper arm dialysis access.    Discussed with patient.    Deejay Mcneil MD

## 2025-06-23 NOTE — CONSULTS
[facility]    Nephrology Consultation     Date of Admission:  6/22/2025    Assessment & Plan     Blanco Osborne is a 60 year old male who was admitted on 6/22/2025.     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  He normally runs at [facility] on a MWF basis.  L avf.  3.5 H.  16 ga needles. .  TW 89.5 kfg.    2) Clotted AVF.  Last US 10/10/2024 showed focal stenosis in cephalic vein/brachial vein anastomosis.    3) Anemia: HGB 9.4.  He is on MIrcera 30 mcg Q2W.    4) Hyponatremia:  Due to reduced water clearance in ESRD.    Plan:    Declot in IR today  HD to follow.      Kb Moulton MD  [facility] - Nephrology  [phone]    Reason for Consult     I was asked to see the patient for ESRD.    Primary Care Physician     Ki Carter    Chief Complaint     Clotted AVF    History is obtained from the patient and chart review.      History of Present Illness     Blanco Osborne is a 60 year old male who presents with a clotted AVF.    He normally runs on a MWF basis at [facility].  He treated there Wed and then went up to [place].  He fished and then reported for HD there and had a clotted AVF.  He could not run.    He present to ED for eval and treatment.    Her feels OK. Some MALDONADO but not at rest.    No cough or orthopnea.    Past Medical History   I have reviewed this patient's medical history and updated it with pertinent information if needed.   Past Medical History:   Diagnosis Date    Acquired immunocompromised state 11/19/2022    Acute renal failure, unspecified acute renal failure type 11/12/2022    Antiplatelet or antithrombotic long-term use     Arrhythmia     PEA    Ascites     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    Cancer (H) 2023    Squamous Cell Cancer- Since resolved    Cirrhosis of liver with ascites (H) 02/11/2016    Coagulopathy     Critical illness myopathy     Dialysis patient     DIALYSIS 3X/ WEEK    Embolic stroke (H) 11/20/2022     Encephalopathy     ESRD (end stage renal disease) on dialysis (H)     Gastroesophageal reflux disease     H/O alcohol abuse     History of blood transfusion     Hyperammonemia     Hyperlipidemia     Hypertension     Patients states that HTN has been resolved    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    Insomnia     Lactic acidosis 11/12/2022    Liver replaced by transplant (H) 09/20/2016    NAFLD (nonalcoholic fatty liver disease) 02/11/2016    CHRISTIAN on CPAP     Parainfluenza type 1 infection 11/19/2022    Parapneumonic effusion     Pleural effusion     Pneumothorax on left 12/16/2022    Respiratory acidosis 11/12/2022    Respiratory arrest (H) 11/12/2022    Restless legs syndrome (RLS)     SBP (spontaneous bacterial peritonitis) (H)     [facility]    Seizures (H) 12/2022    Sepsis due to Streptococcus pneumoniae with acute hypoxic respiratory failure (H) 11/19/2022    Stroke, embolic (H) 11/20/2022    Sudden cardiac arrest (H) 12/29/2022    PEA- 2 min of CPR    Tubular adenoma 10/2019    Large cecal adenoma- due for surveillance colonoscopy in 3 years (10/2022)       Past Surgical History   I have reviewed this patient's surgical history and updated it with pertinent information if needed.  Past Surgical History:   Procedure Laterality Date    APPENDECTOMY      BENCH LIVER N/A 09/20/2016    Procedure: BENCH LIVER;  Surgeon: Enoc Crews MD;  Location: [facility] OR    BRONCHOSCOPY FLEXIBLE AND RIGID N/A 12/20/2022    Procedure: BRONCHOSCOPY;  Surgeon: Alena Valenzuela MD;  Location:  GI    COLONOSCOPY  08/06/2013    repeat in 2018    COLONOSCOPY N/A 10/04/2019    Procedure: COLONOSCOPY, WITH POLYPECTOMY AND BIOPSY;  Surgeon: Go Chong MD;  [facility]: [facility] OR    COLONOSCOPY N/A 3/26/2024    Procedure: COLONOSCOPY, FLEXIBLE, WITH LESION REMOVAL USING SNARE;  Surgeon: Jie Douglas MD;  Location:  GI    CREATE FISTULA ARTERIOVENOUS UPPER EXTREMITY Left 03/21/2023    Procedure: LEFT UPPER  EXTREMITY ARTERIOVENOUS FISTULA CREATION. LIGATION OF COMPETING VASCULAR BRANCHES- LEFT;  Surgeon: Deejay Mcneil MD;  Location:  OR    CV CORONARY ANGIOGRAM N/A 2/22/2024    Procedure: Coronary Angiogram;  Surgeon: Nima Dimas MD;  Location:  HEART CARDIAC CATH LAB    CV PERICARDIOCENTESIS N/A 9/29/2023    Procedure: Pericardiocentesis;  Surgeon: Barry Mckeon MD;  Location:  HEART CARDIAC CATH LAB    CV PERICARDIOCENTESIS N/A 10/11/2023    Procedure: Pericardiocentesis;  Surgeon: Ulises Bhakta MD;  Location:  HEART CARDIAC CATH LAB    CV RIGHT HEART CATH MEASUREMENTS RECORDED N/A 10/11/2023    Procedure: Right Heart Catheterization;  Surgeon: Ulises Bhakta MD;  Location:  HEART CARDIAC CATH LAB    HERNIA REPAIR      IR CHEST TUBE PLACEMENT NON-TUNNELED RIGHT  12/12/2022    IR CVC TUNNEL PLACEMENT > 5 YRS OF AGE  12/06/2022    IR CVC TUNNEL PLACEMENT > 5 YRS OF AGE  1/3/2024    IR DIALYSIS FISTULOGRAM LEFT  07/13/2023    IR GASTROSTOMY TUBE CHANGE  02/08/2023    IR GASTROSTOMY TUBE PERCUTANEOUS PLCMNT  11/29/2022    IR PARACENTESIS  11/29/2022    IR PARACENTESIS  12/01/2022    IR PARACENTESIS  2/10/2016    IR PARACENTESIS  2/28/2016    IR THORACENTESIS  12/06/2022    IR THORACENTESIS  09/01/2020    IR THORACENTESIS  08/10/2020    IR TRANSCATHETER BIOPSY  12/01/2022    IR TRANSCATHETER BIOPSY  4/9/2024    REVISION FISTULA ARTERIOVENOUS UPPER EXTREMITY Left 8/15/2023    Procedure: 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 2; OUTFLOW REVISION CEPHALIC TO BRACHIAL VEIN;  Surgeon: Deejay Mcneil MD;  Location:  OR    REVISION FISTULA ARTERIOVENOUS UPPER EXTREMITY Left 1/3/2024    Procedure: Excision and repair of LEFT brachiocephalic arteriovenous fistula and vein patch angioplasty;  Surgeon: Deejay Mcneil MD;  Location:  OR    TRACHEOSTOMY N/A 11/29/2022    Procedure: TRACHEOSTOMY;  Surgeon: Nilesh Jackson MD;  Location:  OR     TRANSPLANT LIVER RECIPIENT  DONOR N/A 2016    Procedure: TRANSPLANT LIVER RECIPIENT  DONOR;  Surgeon: Enoc Crews MD;  Location: [facility] OR       Prior to Admission Medications   Prior to Admission Medications   Prescriptions Last Dose Informant Patient Reported? Taking?   acetaminophen (TYLENOL) 325 MG tablet   Yes Yes   Sig: Take 2 tablets (650 mg) by mouth every 8 hours as needed for other (For optimal non-opioid multimodal pain management to improve pain control.)   aspirin 81 MG EC tablet 2025  No Yes   Sig: Take 1 tablet (81 mg) by mouth daily   gabapentin (NEURONTIN) 100 MG capsule 2025  No Yes   Sig: TAKE 3 CAPSULES(300 MG) BY MOUTH AT BEDTIME   hydrOXYzine HCl (ATARAX) 25 MG tablet   No Yes   Sig: Take 1 tablet (25 mg) by mouth 3 times daily as needed for itching.   lacosamide (VIMPAT) 50 MG TABS tablet 2025  No Yes   Sig: Take 1 tablet (50 mg) by mouth 2 times daily.   melatonin 3 MG tablet  Spouse/Significant Other No Yes   Sig: Take 1 tablet (3 mg) by mouth At Bedtime   midodrine (PROAMATINE) 10 MG tablet 2025  No Yes   Sig: TAKE 1 TABLET 3X DAILY. ON DAILYSIS DAYS(M,,) TAKE 2 EXTRA TABLETS 1 HOUR BEFORE,1 TABLET WHEN YOU ARRIVE, AND 1 TABLET DURING DIALYSIS.   multivitamin RENAL (TRIPHROCAPS) 1 capsule capsule 2025  No Yes   Sig: TAKE 1 CAPSULE BY MOUTH DAILY   omeprazole (PRILOSEC) 40 MG DR capsule   No Yes   Sig: TAKE 1 CAPSULE(40 MG) BY MOUTH DAILY   oxyCODONE (ROXICODONE) 5 MG tablet   No Yes   Sig: Take 1 tablet (5 mg) by mouth every 6 hours as needed for pain.   rOPINIRole (REQUIP) 0.5 MG tablet   No Yes   Sig: TAKE 1 TABLET(0.5 MG) BY MOUTH TWICE DAILY   sevelamer carbonate (RENVELA) 800 MG tablet   No Yes   Sig: Take 1 tablet (800 mg) by mouth 4 times daily With meals and snacks   Patient taking differently: Take 800 mg by mouth 4 times daily as needed. With meals and snacks   tacrolimus (GENERIC EQUIVALENT) 1 MG capsule 2025  No Yes    Sig: TAKE 1 CAPSULE BY MOUTH EVERY 12 HOURS   traZODone (DESYREL) 50 MG tablet 2025  No Yes   Sig: Take 1 tablet (50 mg) by mouth nightly as needed for sleep.   warfarin ANTICOAGULANT (COUMADIN) 2 MG tablet   No Yes   Si mg Mon, Fri; 6 mg all other days or as directed by INR clinic      Facility-Administered Medications: None     Allergies   No Known Allergies    Social History   I have reviewed this patient's social history and updated it with pertinent information if needed. Blanco Osborne  reports that he has never smoked. He has never been exposed to tobacco smoke. He has never used smokeless tobacco. He reports that he does not currently use alcohol. He reports that he does not use drugs.    Family History   I have reviewed this patient's family history and updated it with pertinent information if needed.   Family History   Problem Relation Age of Onset    Coronary Artery Disease No family hx of     Cardiomyopathy No family hx of     Anesthesia Reaction No family hx of     Deep Vein Thrombosis (DVT) No family hx of        Review of Systems   The 10 point Review of Systems is negative other than noted in the HPI.     Physical Exam   Temp: 98  F (36.7  C) Temp src: Oral BP: 101/70 Pulse: 75   Resp: 18 SpO2: 98 % O2 Device: None (Room air)    Vital Signs with Ranges  Temp:  [98  F (36.7  C)-98.5  F (36.9  C)] 98  F (36.7  C)  Pulse:  [75-90] 75  Resp:  [18] 18  BP: (100-106)/(66-70) 101/70  SpO2:  [96 %-98 %] 98 %  0 lbs 0 oz    GENERAL: healthy, alert, NAD  HEENT:  Normocephalic. No gross abnormalities.  Pupils equal.  MMM.  Dentition is ok.  CV: RRR, no murmurs, no clicks, gallops, or rubs, no edema, no carotid bruits  RESP: Clear bilaterally with good efforts  GI: Abdomen soft/nt/nd, BS normal. No masses, organomegaly  MUSCULOSKELETAL: L arm AVF no bruit and no thrill  SKIN: no suspicious lesions or rashes, dry to touch  NEURO:  Strength normal and symmetric.   PSYCH: mood good, affect  appropriate  LYMPH: No palpable ant/post cervical and supraclavicular adenopathy    Data   BMP  Recent Labs   Lab 06/23/25  0621 06/22/25 2004   * 128*   POTASSIUM 4.6 4.8   CHLORIDE 96* 91*   KELLY 8.3* 8.6*   CO2 21* 21*   BUN 66.2* 60.4*   CR 10.79* 10.37*   GLC 98 106*     Phos@LABRCNTIPR(phos:4)  CBC)  Recent Labs   Lab 06/22/25 2004   WBC 4.1   HGB 9.4*   HCT 27.6*   MCV 99   PLT 77*     Recent Labs   Lab 06/23/25  1047   INR 1.31*

## 2025-06-23 NOTE — PROGRESS NOTES
Planning dialysis today after declot.  He has not had HD since Wednesday 6/18/25.      Kb Moulton MD

## 2025-06-23 NOTE — ED TRIAGE NOTES
"Pt reports he is a dialysis pt and does it M/W/F, on Friday pt was out of town and went to center for run but fistula had a clot and run could not be completed. Pt reports that he was told that he \"would be fine\" until Monday and that IR after hours was not an option.      Triage Assessment (Adult)       Row Name 06/22/25 1933          Triage Assessment    Airway WDL WDL        Respiratory WDL    Respiratory WDL X;all     Rhythm/Pattern, Respiratory shortness of breath        Cardiac WDL    Cardiac WDL WDL        Peripheral/Neurovascular WDL    Peripheral Neurovascular WDL WDL        Cognitive/Neuro/Behavioral WDL    Cognitive/Neuro/Behavioral WDL WDL                     "

## 2025-06-24 ENCOUNTER — ANESTHESIA EVENT (OUTPATIENT)
Dept: SURGERY | Facility: CLINIC | Age: 60
End: 2025-06-24
Payer: COMMERCIAL

## 2025-06-24 ENCOUNTER — ANESTHESIA (OUTPATIENT)
Dept: SURGERY | Facility: CLINIC | Age: 60
End: 2025-06-24
Payer: COMMERCIAL

## 2025-06-24 LAB
ANION GAP SERPL CALCULATED.3IONS-SCNC: 18 MMOL/L (ref 7–15)
BUN SERPL-MCNC: 36 MG/DL (ref 8–23)
CALCIUM SERPL-MCNC: 8.9 MG/DL (ref 8.8–10.4)
CHLORIDE SERPL-SCNC: 94 MMOL/L (ref 98–107)
CREAT SERPL-MCNC: 6.73 MG/DL (ref 0.67–1.17)
EGFRCR SERPLBLD CKD-EPI 2021: 9 ML/MIN/1.73M2
ERYTHROCYTE [DISTWIDTH] IN BLOOD BY AUTOMATED COUNT: 15 % (ref 10–15)
GLUCOSE SERPL-MCNC: 103 MG/DL (ref 70–99)
HCO3 SERPL-SCNC: 23 MMOL/L (ref 22–29)
HCT VFR BLD AUTO: 26.2 % (ref 40–53)
HGB BLD-MCNC: 8.8 G/DL (ref 13.3–17.7)
INR PPP: 1.49 (ref 0.85–1.15)
MCH RBC QN AUTO: 34.8 PG (ref 26.5–33)
MCHC RBC AUTO-ENTMCNC: 33.6 G/DL (ref 31.5–36.5)
MCV RBC AUTO: 104 FL (ref 78–100)
PLATELET # BLD AUTO: 86 10E3/UL (ref 150–450)
POTASSIUM SERPL-SCNC: 4.9 MMOL/L (ref 3.4–5.3)
PROTHROMBIN TIME: 17.6 SECONDS (ref 11.8–14.8)
RBC # BLD AUTO: 2.53 10E6/UL (ref 4.4–5.9)
SODIUM SERPL-SCNC: 135 MMOL/L (ref 135–145)
WBC # BLD AUTO: 9.2 10E3/UL (ref 4–11)

## 2025-06-24 PROCEDURE — 99418 PROLNG IP/OBS E/M EA 15 MIN: CPT | Mod: FS | Performed by: PHYSICIAN ASSISTANT

## 2025-06-24 PROCEDURE — 250N000025 HC SEVOFLURANE, PER MIN: Performed by: SURGERY

## 2025-06-24 PROCEDURE — 710N000009 HC RECOVERY PHASE 1, LEVEL 1, PER MIN: Performed by: SURGERY

## 2025-06-24 PROCEDURE — 250N000013 HC RX MED GY IP 250 OP 250 PS 637: Performed by: PHYSICIAN ASSISTANT

## 2025-06-24 PROCEDURE — 250N000011 HC RX IP 250 OP 636: Performed by: SURGERY

## 2025-06-24 PROCEDURE — 999N000141 HC STATISTIC PRE-PROCEDURE NURSING ASSESSMENT: Performed by: SURGERY

## 2025-06-24 PROCEDURE — 05CY0ZZ EXTIRPATION OF MATTER FROM UPPER VEIN, OPEN APPROACH: ICD-10-PCS | Performed by: SURGERY

## 2025-06-24 PROCEDURE — 258N000003 HC RX IP 258 OP 636: Performed by: STUDENT IN AN ORGANIZED HEALTH CARE EDUCATION/TRAINING PROGRAM

## 2025-06-24 PROCEDURE — 120N000001 HC R&B MED SURG/OB

## 2025-06-24 PROCEDURE — 85027 COMPLETE CBC AUTOMATED: CPT | Performed by: PHYSICIAN ASSISTANT

## 2025-06-24 PROCEDURE — 80048 BASIC METABOLIC PNL TOTAL CA: CPT | Performed by: PHYSICIAN ASSISTANT

## 2025-06-24 PROCEDURE — 250N000013 HC RX MED GY IP 250 OP 250 PS 637: Performed by: INTERNAL MEDICINE

## 2025-06-24 PROCEDURE — 250N000009 HC RX 250: Performed by: ANESTHESIOLOGY

## 2025-06-24 PROCEDURE — 258N000003 HC RX IP 258 OP 636: Performed by: ANESTHESIOLOGY

## 2025-06-24 PROCEDURE — 250N000013 HC RX MED GY IP 250 OP 250 PS 637: Performed by: HOSPITALIST

## 2025-06-24 PROCEDURE — 250N000012 HC RX MED GY IP 250 OP 636 PS 637: Performed by: PHYSICIAN ASSISTANT

## 2025-06-24 PROCEDURE — 258N000003 HC RX IP 258 OP 636: Performed by: SURGERY

## 2025-06-24 PROCEDURE — 250N000011 HC RX IP 250 OP 636: Performed by: STUDENT IN AN ORGANIZED HEALTH CARE EDUCATION/TRAINING PROGRAM

## 2025-06-24 PROCEDURE — 370N000017 HC ANESTHESIA TECHNICAL FEE, PER MIN: Performed by: SURGERY

## 2025-06-24 PROCEDURE — 250N000013 HC RX MED GY IP 250 OP 250 PS 637: Performed by: STUDENT IN AN ORGANIZED HEALTH CARE EDUCATION/TRAINING PROGRAM

## 2025-06-24 PROCEDURE — 360N000077 HC SURGERY LEVEL 4, PER MIN: Performed by: SURGERY

## 2025-06-24 PROCEDURE — 36415 COLL VENOUS BLD VENIPUNCTURE: CPT | Performed by: PHYSICIAN ASSISTANT

## 2025-06-24 PROCEDURE — 272N000001 HC OR GENERAL SUPPLY STERILE: Performed by: SURGERY

## 2025-06-24 PROCEDURE — 05BY0ZZ EXCISION OF UPPER VEIN, OPEN APPROACH: ICD-10-PCS | Performed by: SURGERY

## 2025-06-24 PROCEDURE — 250N000009 HC RX 250: Performed by: STUDENT IN AN ORGANIZED HEALTH CARE EDUCATION/TRAINING PROGRAM

## 2025-06-24 PROCEDURE — 85610 PROTHROMBIN TIME: CPT | Performed by: PHYSICIAN ASSISTANT

## 2025-06-24 PROCEDURE — 250N000011 HC RX IP 250 OP 636: Performed by: ANESTHESIOLOGY

## 2025-06-24 PROCEDURE — 99233 SBSQ HOSP IP/OBS HIGH 50: CPT | Mod: FS | Performed by: PHYSICIAN ASSISTANT

## 2025-06-24 PROCEDURE — G0378 HOSPITAL OBSERVATION PER HR: HCPCS

## 2025-06-24 PROCEDURE — 96374 THER/PROPH/DIAG INJ IV PUSH: CPT

## 2025-06-24 PROCEDURE — 99207 PR APP CREDIT; MD BILLING SHARED VISIT: CPT | Performed by: HOSPITALIST

## 2025-06-24 PROCEDURE — 258N000003 HC RX IP 258 OP 636: Performed by: HOSPITALIST

## 2025-06-24 PROCEDURE — 36833 AV FISTULA REVISION: CPT | Mod: LT | Performed by: SURGERY

## 2025-06-24 PROCEDURE — 250N000011 HC RX IP 250 OP 636: Performed by: HOSPITALIST

## 2025-06-24 PROCEDURE — 250N000009 HC RX 250: Performed by: SURGERY

## 2025-06-24 RX ORDER — HYDROMORPHONE HYDROCHLORIDE 1 MG/ML
0.3 INJECTION, SOLUTION INTRAMUSCULAR; INTRAVENOUS; SUBCUTANEOUS EVERY 4 HOURS PRN
Status: DISCONTINUED | OUTPATIENT
Start: 2025-06-24 | End: 2025-06-25 | Stop reason: HOSPADM

## 2025-06-24 RX ORDER — SODIUM CHLORIDE 9 MG/ML
INJECTION, SOLUTION INTRAVENOUS CONTINUOUS
Status: DISCONTINUED | OUTPATIENT
Start: 2025-06-24 | End: 2025-06-24 | Stop reason: HOSPADM

## 2025-06-24 RX ORDER — LIDOCAINE HYDROCHLORIDE 10 MG/ML
INJECTION, SOLUTION INFILTRATION; PERINEURAL
Status: DISCONTINUED
Start: 2025-06-24 | End: 2025-06-24 | Stop reason: HOSPADM

## 2025-06-24 RX ORDER — FENTANYL CITRATE 0.05 MG/ML
50 INJECTION, SOLUTION INTRAMUSCULAR; INTRAVENOUS EVERY 5 MIN PRN
Status: DISCONTINUED | OUTPATIENT
Start: 2025-06-24 | End: 2025-06-24 | Stop reason: HOSPADM

## 2025-06-24 RX ORDER — MAGNESIUM HYDROXIDE 1200 MG/15ML
LIQUID ORAL PRN
Status: DISCONTINUED | OUTPATIENT
Start: 2025-06-24 | End: 2025-06-24 | Stop reason: HOSPADM

## 2025-06-24 RX ORDER — OXYCODONE HYDROCHLORIDE 5 MG/1
5 TABLET ORAL EVERY 4 HOURS PRN
Refills: 0 | Status: DISCONTINUED | OUTPATIENT
Start: 2025-06-24 | End: 2025-06-25 | Stop reason: HOSPADM

## 2025-06-24 RX ORDER — NALOXONE HYDROCHLORIDE 0.4 MG/ML
0.1 INJECTION, SOLUTION INTRAMUSCULAR; INTRAVENOUS; SUBCUTANEOUS
Status: DISCONTINUED | OUTPATIENT
Start: 2025-06-24 | End: 2025-06-24 | Stop reason: HOSPADM

## 2025-06-24 RX ORDER — FENTANYL CITRATE 50 UG/ML
INJECTION, SOLUTION INTRAMUSCULAR; INTRAVENOUS PRN
Status: DISCONTINUED | OUTPATIENT
Start: 2025-06-24 | End: 2025-06-24

## 2025-06-24 RX ORDER — HYDROMORPHONE HCL IN WATER/PF 6 MG/30 ML
0.4 PATIENT CONTROLLED ANALGESIA SYRINGE INTRAVENOUS EVERY 5 MIN PRN
Status: DISCONTINUED | OUTPATIENT
Start: 2025-06-24 | End: 2025-06-24 | Stop reason: HOSPADM

## 2025-06-24 RX ORDER — LIDOCAINE HYDROCHLORIDE 20 MG/ML
INJECTION, SOLUTION INFILTRATION; PERINEURAL PRN
Status: DISCONTINUED | OUTPATIENT
Start: 2025-06-24 | End: 2025-06-24

## 2025-06-24 RX ORDER — HEPARIN SODIUM 1000 [USP'U]/ML
INJECTION, SOLUTION INTRAVENOUS; SUBCUTANEOUS PRN
Status: DISCONTINUED | OUTPATIENT
Start: 2025-06-24 | End: 2025-06-24 | Stop reason: HOSPADM

## 2025-06-24 RX ORDER — SODIUM CHLORIDE, SODIUM LACTATE, POTASSIUM CHLORIDE, CALCIUM CHLORIDE 600; 310; 30; 20 MG/100ML; MG/100ML; MG/100ML; MG/100ML
INJECTION, SOLUTION INTRAVENOUS CONTINUOUS
Status: DISCONTINUED | OUTPATIENT
Start: 2025-06-24 | End: 2025-06-24 | Stop reason: HOSPADM

## 2025-06-24 RX ORDER — ONDANSETRON 2 MG/ML
INJECTION INTRAMUSCULAR; INTRAVENOUS PRN
Status: DISCONTINUED | OUTPATIENT
Start: 2025-06-24 | End: 2025-06-24

## 2025-06-24 RX ORDER — ONDANSETRON 4 MG/1
4 TABLET, ORALLY DISINTEGRATING ORAL EVERY 30 MIN PRN
Status: DISCONTINUED | OUTPATIENT
Start: 2025-06-24 | End: 2025-06-24 | Stop reason: HOSPADM

## 2025-06-24 RX ORDER — DEXAMETHASONE SODIUM PHOSPHATE 4 MG/ML
4 INJECTION, SOLUTION INTRA-ARTICULAR; INTRALESIONAL; INTRAMUSCULAR; INTRAVENOUS; SOFT TISSUE
Status: DISCONTINUED | OUTPATIENT
Start: 2025-06-24 | End: 2025-06-24 | Stop reason: HOSPADM

## 2025-06-24 RX ORDER — HYDROMORPHONE HCL IN WATER/PF 6 MG/30 ML
0.2 PATIENT CONTROLLED ANALGESIA SYRINGE INTRAVENOUS EVERY 5 MIN PRN
Status: DISCONTINUED | OUTPATIENT
Start: 2025-06-24 | End: 2025-06-24 | Stop reason: HOSPADM

## 2025-06-24 RX ORDER — CEFAZOLIN SODIUM 1 G/3ML
INJECTION, POWDER, FOR SOLUTION INTRAMUSCULAR; INTRAVENOUS PRN
Status: DISCONTINUED | OUTPATIENT
Start: 2025-06-24 | End: 2025-06-24

## 2025-06-24 RX ORDER — FENTANYL CITRATE 0.05 MG/ML
25 INJECTION, SOLUTION INTRAMUSCULAR; INTRAVENOUS EVERY 5 MIN PRN
Status: DISCONTINUED | OUTPATIENT
Start: 2025-06-24 | End: 2025-06-24 | Stop reason: HOSPADM

## 2025-06-24 RX ORDER — ONDANSETRON 2 MG/ML
4 INJECTION INTRAMUSCULAR; INTRAVENOUS EVERY 30 MIN PRN
Status: DISCONTINUED | OUTPATIENT
Start: 2025-06-24 | End: 2025-06-24 | Stop reason: HOSPADM

## 2025-06-24 RX ORDER — HEPARIN SODIUM 1000 [USP'U]/ML
INJECTION, SOLUTION INTRAVENOUS; SUBCUTANEOUS
Status: DISCONTINUED
Start: 2025-06-24 | End: 2025-06-24 | Stop reason: HOSPADM

## 2025-06-24 RX ORDER — OXYCODONE HYDROCHLORIDE 5 MG/1
5 TABLET ORAL EVERY 4 HOURS PRN
Status: DISCONTINUED | OUTPATIENT
Start: 2025-06-24 | End: 2025-06-25 | Stop reason: HOSPADM

## 2025-06-24 RX ORDER — BUPIVACAINE HYDROCHLORIDE 5 MG/ML
INJECTION, SOLUTION EPIDURAL; INTRACAUDAL; PERINEURAL
Status: DISCONTINUED
Start: 2025-06-24 | End: 2025-06-24 | Stop reason: HOSPADM

## 2025-06-24 RX ORDER — BUPIVACAINE HYDROCHLORIDE 5 MG/ML
INJECTION, SOLUTION PERINEURAL PRN
Status: DISCONTINUED | OUTPATIENT
Start: 2025-06-24 | End: 2025-06-24 | Stop reason: HOSPADM

## 2025-06-24 RX ORDER — HEPARIN SODIUM 1000 [USP'U]/ML
INJECTION, SOLUTION INTRAVENOUS; SUBCUTANEOUS PRN
Status: DISCONTINUED | OUTPATIENT
Start: 2025-06-24 | End: 2025-06-24

## 2025-06-24 RX ORDER — MIDODRINE HYDROCHLORIDE 5 MG/1
10 TABLET ORAL ONCE
Status: COMPLETED | OUTPATIENT
Start: 2025-06-24 | End: 2025-06-24

## 2025-06-24 RX ADMIN — PANTOPRAZOLE SODIUM 40 MG: 40 TABLET, DELAYED RELEASE ORAL at 07:56

## 2025-06-24 RX ADMIN — PHENYLEPHRINE HYDROCHLORIDE 0.5 MCG/KG/MIN: 10 INJECTION INTRAVENOUS at 16:02

## 2025-06-24 RX ADMIN — OXYCODONE HYDROCHLORIDE 5 MG: 5 TABLET ORAL at 00:59

## 2025-06-24 RX ADMIN — OXYCODONE HYDROCHLORIDE 5 MG: 5 TABLET ORAL at 07:56

## 2025-06-24 RX ADMIN — ACETAMINOPHEN 650 MG: 325 TABLET, FILM COATED ORAL at 07:56

## 2025-06-24 RX ADMIN — LACOSAMIDE 50 MG: 50 TABLET, FILM COATED ORAL at 07:56

## 2025-06-24 RX ADMIN — LIDOCAINE HYDROCHLORIDE 50 MG: 20 INJECTION, SOLUTION INFILTRATION; PERINEURAL at 15:59

## 2025-06-24 RX ADMIN — NOREPINEPHRINE BITARTRATE 6.4 MCG: 1 INJECTION, SOLUTION, CONCENTRATE INTRAVENOUS at 16:47

## 2025-06-24 RX ADMIN — ROPINIROLE HYDROCHLORIDE 0.5 MG: 0.25 TABLET, FILM COATED ORAL at 20:27

## 2025-06-24 RX ADMIN — FENTANYL CITRATE 50 MCG: 50 INJECTION INTRAMUSCULAR; INTRAVENOUS at 15:59

## 2025-06-24 RX ADMIN — CEFAZOLIN 2 G: 1 INJECTION, POWDER, FOR SOLUTION INTRAMUSCULAR; INTRAVENOUS at 15:53

## 2025-06-24 RX ADMIN — TACROLIMUS 1 MG: 1 CAPSULE ORAL at 07:56

## 2025-06-24 RX ADMIN — FENTANYL CITRATE 50 MCG: 50 INJECTION INTRAMUSCULAR; INTRAVENOUS at 16:19

## 2025-06-24 RX ADMIN — TACROLIMUS 1 MG: 1 CAPSULE ORAL at 22:00

## 2025-06-24 RX ADMIN — ONDANSETRON 4 MG: 2 INJECTION INTRAMUSCULAR; INTRAVENOUS at 17:13

## 2025-06-24 RX ADMIN — ROPINIROLE HYDROCHLORIDE 0.5 MG: 0.25 TABLET, FILM COATED ORAL at 07:56

## 2025-06-24 RX ADMIN — NOREPINEPHRINE BITARTRATE 6.4 MCG: 1 INJECTION, SOLUTION, CONCENTRATE INTRAVENOUS at 16:08

## 2025-06-24 RX ADMIN — HEPARIN SODIUM 3000 UNITS: 1000 INJECTION, SOLUTION INTRAVENOUS; SUBCUTANEOUS at 16:40

## 2025-06-24 RX ADMIN — OXYCODONE HYDROCHLORIDE 5 MG: 5 TABLET ORAL at 12:42

## 2025-06-24 RX ADMIN — Medication 1 CAPSULE: at 07:56

## 2025-06-24 RX ADMIN — NOREPINEPHRINE BITARTRATE 6.4 MCG: 1 INJECTION, SOLUTION, CONCENTRATE INTRAVENOUS at 17:12

## 2025-06-24 RX ADMIN — SODIUM CHLORIDE: 900 INJECTION INTRAVENOUS at 15:53

## 2025-06-24 RX ADMIN — ONDANSETRON 4 MG: 2 INJECTION, SOLUTION INTRAMUSCULAR; INTRAVENOUS at 00:58

## 2025-06-24 RX ADMIN — SODIUM CHLORIDE 500 ML: 0.9 INJECTION, SOLUTION INTRAVENOUS at 21:57

## 2025-06-24 RX ADMIN — MIDODRINE HYDROCHLORIDE 10 MG: 5 TABLET ORAL at 02:26

## 2025-06-24 RX ADMIN — GABAPENTIN 300 MG: 300 CAPSULE ORAL at 00:58

## 2025-06-24 RX ADMIN — GABAPENTIN 300 MG: 300 CAPSULE ORAL at 21:59

## 2025-06-24 RX ADMIN — OXYCODONE HYDROCHLORIDE 5 MG: 5 TABLET ORAL at 20:27

## 2025-06-24 RX ADMIN — NOREPINEPHRINE BITARTRATE 6.4 MCG: 1 INJECTION, SOLUTION, CONCENTRATE INTRAVENOUS at 17:00

## 2025-06-24 RX ADMIN — ACETAMINOPHEN 650 MG: 325 TABLET, FILM COATED ORAL at 12:42

## 2025-06-24 RX ADMIN — SENNOSIDES AND DOCUSATE SODIUM 1 TABLET: 50; 8.6 TABLET ORAL at 07:57

## 2025-06-24 RX ADMIN — LACOSAMIDE 50 MG: 50 TABLET, FILM COATED ORAL at 23:23

## 2025-06-24 ASSESSMENT — ACTIVITIES OF DAILY LIVING (ADL)
ADLS_ACUITY_SCORE: 58

## 2025-06-24 ASSESSMENT — ENCOUNTER SYMPTOMS
DYSRHYTHMIAS: 1
SEIZURES: 1

## 2025-06-24 ASSESSMENT — LIFESTYLE VARIABLES: TOBACCO_USE: 0

## 2025-06-24 NOTE — ANESTHESIA PREPROCEDURE EVALUATION
Anesthesia Pre-Procedure Evaluation    Patient: Blanco Osborne   MRN: [id number] : 1965          Procedure : Procedure(s):  REVISION, ARTERIOVENOUS FISTULA, UPPER EXTREMITY         Past Medical History:   Diagnosis Date     Acquired immunocompromised state 2022     Acute renal failure, unspecified acute renal failure type 2022     Antiplatelet or antithrombotic long-term use      Arrhythmia     PEA     Ascites      Aspergillus pneumonia (H) 2022     Cancer (H)     Squamous Cell Cancer- Since resolved     Cirrhosis of liver with ascites (H) 2016     Coagulopathy      Critical illness myopathy      Dialysis patient     DIALYSIS 3X/ WEEK     Embolic stroke (H) 2022     Encephalopathy      ESRD (end stage renal disease) on dialysis (H)      Gastroesophageal reflux disease      H/O alcohol abuse      History of blood transfusion      Hyperammonemia      Hyperlipidemia      Hypertension     Patients states that HTN has been resolved     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     Insomnia      Lactic acidosis 2022     Liver replaced by transplant (H) 2016     NAFLD (nonalcoholic fatty liver disease) 2016     CHRISTIAN on CPAP      Parainfluenza type 1 infection 2022     Parapneumonic effusion      Pleural effusion      Pneumothorax on left 2022     Respiratory acidosis 2022     Respiratory arrest (H) 2022     Restless legs syndrome (RLS)      SBP (spontaneous bacterial peritonitis) (H)     [facility]     Seizures (H) 2022     Sepsis due to Streptococcus pneumoniae with acute hypoxic respiratory failure (H) 2022     Stroke, embolic (H) 2022     Sudden cardiac arrest (H) 2022    PEA- 2 min of CPR     Tubular adenoma 10/2019    Large cecal adenoma- due for surveillance colonoscopy in 3 years (10/2022)      Past Surgical History:   Procedure Laterality Date     APPENDECTOMY       BENCH LIVER N/A 2016    Procedure:  BENCH LIVER;  Surgeon: Enoc Crews MD;  Location: [facility] OR     BRONCHOSCOPY FLEXIBLE AND RIGID N/A 12/20/2022    Procedure: BRONCHOSCOPY;  Surgeon: Alena Valenzuela MD;  Location:  GI     COLONOSCOPY  08/06/2013    repeat in 2018     COLONOSCOPY N/A 10/04/2019    Procedure: COLONOSCOPY, WITH POLYPECTOMY AND BIOPSY;  Surgeon: Go Chong MD;  [facility]: [facility] OR     COLONOSCOPY N/A 3/26/2024    Procedure: COLONOSCOPY, FLEXIBLE, WITH LESION REMOVAL USING SNARE;  Surgeon: Jie Douglas MD;  Location:  GI     CREATE FISTULA ARTERIOVENOUS UPPER EXTREMITY Left 03/21/2023    Procedure: LEFT UPPER EXTREMITY ARTERIOVENOUS FISTULA CREATION. LIGATION OF COMPETING VASCULAR BRANCHES- LEFT;  Surgeon: Deeajy Mcneil MD;  Location:  OR     CV CORONARY ANGIOGRAM N/A 2/22/2024    Procedure: Coronary Angiogram;  Surgeon: Nima Dimas MD;  Location:  HEART CARDIAC CATH LAB     CV PERICARDIOCENTESIS N/A 9/29/2023    Procedure: Pericardiocentesis;  Surgeon: Barry Mckeon MD;  Location:  HEART CARDIAC CATH LAB     CV PERICARDIOCENTESIS N/A 10/11/2023    Procedure: Pericardiocentesis;  Surgeon: Ulises Bhakta MD;  Location:  HEART CARDIAC CATH LAB     CV RIGHT HEART CATH MEASUREMENTS RECORDED N/A 10/11/2023    Procedure: Right Heart Catheterization;  Surgeon: Ulises Bhakta MD;  Location:  HEART CARDIAC CATH LAB     HERNIA REPAIR       IR CHEST TUBE PLACEMENT NON-TUNNELED RIGHT  12/12/2022     IR CVC TUNNEL PLACEMENT > 5 YRS OF AGE  12/06/2022     IR CVC TUNNEL PLACEMENT > 5 YRS OF AGE  1/3/2024     IR CVC TUNNEL PLACEMENT > 5 YRS OF AGE  6/23/2025     IR DIALYSIS FISTULOGRAM LEFT  07/13/2023     IR DIALYSIS FISTULOGRAM LEFT  6/23/2025     IR GASTROSTOMY TUBE CHANGE  02/08/2023     IR GASTROSTOMY TUBE PERCUTANEOUS PLCMNT  11/29/2022     IR PARACENTESIS  11/29/2022     IR PARACENTESIS  12/01/2022     IR PARACENTESIS  2/10/2016     IR PARACENTESIS  2/28/2016      IR THORACENTESIS  2022     IR THORACENTESIS  2020     IR THORACENTESIS  08/10/2020     IR TRANSCATHETER BIOPSY  2022     IR TRANSCATHETER BIOPSY  2024     REVISION FISTULA ARTERIOVENOUS UPPER EXTREMITY Left 8/15/2023    Procedure: 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 2; OUTFLOW REVISION CEPHALIC TO BRACHIAL VEIN;  Surgeon: Deejay Mcneil MD;  Location: [facility] OR     REVISION FISTULA ARTERIOVENOUS UPPER EXTREMITY Left 1/3/2024    Procedure: Excision and repair of LEFT brachiocephalic arteriovenous fistula and vein patch angioplasty;  Surgeon: Deejay Mcneil MD;  Location: [facility] OR     TRACHEOSTOMY N/A 2022    Procedure: TRACHEOSTOMY;  Surgeon: Nilesh Jackson MD;  Location: [facility] OR     TRANSPLANT LIVER RECIPIENT  DONOR N/A 2016    Procedure: TRANSPLANT LIVER RECIPIENT  DONOR;  Surgeon: Enoc Crews MD;  Location: [facility] OR      No Known Allergies   Social History     Tobacco Use     Smoking status: Never     Passive exposure: Never     Smokeless tobacco: Never   Substance Use Topics     Alcohol use: Not Currently     Alcohol/week: 0.0 standard drinks of alcohol      Wt Readings from Last 1 Encounters:   25 93.5 kg (206 lb 3.2 oz)        Anesthesia Evaluation   Pt has had prior anesthetic.     No history of anesthetic complications       ROS/MED HX  ENT/Pulmonary:     (+) sleep apnea (Dx per chart review. Pt unsure of diagnosis),                                    (-) tobacco use, asthma and recent URI   Neurologic: Comment: RLS      (+)       seizures, last seizure: single sz in  while intubated in ICU,  CVA (per imaging results. Denies deficits.),                      Cardiovascular: Comment: History of PEA arrest    (+) Dyslipidemia hypertension- -   -  - -   Taking blood thinners  Instructions Given to patient: on Eliquis.                   dysrhythmias (wide complex tachycardia), a-fib and Other,  valvular problems/murmurs  type: AI     Previous cardiac testing   Echo: Date: 1/11/24 Results:  Global and regional left ventricular function is normal with an EF of 60-65%.  Global right ventricular function is normal.  Mild mitral annular calcification is present. Mild aortic valve calcification  and insufficiency present.  Estimated mean right atrial pressure is normal.  No pericardial effusion is present.      Stress Test:  Date: Results:    ECG Reviewed:  Date: Results:    Cath:  Date: 2/22/24 Results:  Mid LAD lesion is 20% stenosed.  There are no hemodynamically significant coronary artery stenoses.      METS/Exercise Tolerance: 4 - Raking leaves, gardening Comment:      Hematologic: Comments: Thrombocytopenia, platelet baseline       (+)      anemia (baseline Hgb 7-9), history of blood transfusion, no previous transfusion reaction,     (-) history of blood clots   Musculoskeletal:  - neg musculoskeletal ROS     GI/Hepatic: Comment: ETOH cirrhosis s/p liver transplant 2016    (+) GERD (occasional),            liver disease,       Renal/Genitourinary: Comment: Dialysis MWF via chest port, or occasionally via LUE fistula.      (+) renal disease, type: ESRD, Pt requires dialysis, type: Hemodialysis,          Endo:    (-) Type II DM and obesity   Psychiatric/Substance Use:  - neg psychiatric ROS     Infectious Disease:       Malignancy:   (+) Malignancy, History of Skin and Other.Skin CA status post Surgery.  Other CA urothelui status post.    Other: Comment: Immunosuppressed w/ tacrolimus               Physical Exam  Airway  Mallampati: II  TM distance: >3 FB  Neck ROM: full  Mouth opening: >= 4 cm    Cardiovascular - normal exam Comments: History of PEA arrest  Dental   (+) Modest Abnormalities - crowns, retainers, 1 or 2 missing teeth      Pulmonary - normal exam      Neurological - normal exam  He appears awake, alert and oriented x3.    Other Findings       OUTSIDE LABS:  CBC:   Lab Results   Component Value Date    WBC 9.2  06/24/2025    WBC 4.1 06/22/2025    HGB 8.8 (L) 06/24/2025    HGB 9.4 (L) 06/22/2025    HCT 26.2 (L) 06/24/2025    HCT 27.6 (L) 06/22/2025    PLT 86 (L) 06/24/2025    PLT 77 (L) 06/22/2025     BMP:   Lab Results   Component Value Date     06/24/2025     (L) 06/23/2025    POTASSIUM 4.9 06/24/2025    POTASSIUM 4.6 06/23/2025    CHLORIDE 94 (L) 06/24/2025    CHLORIDE 96 (L) 06/23/2025    CO2 23 06/24/2025    CO2 21 (L) 06/23/2025    BUN 36.0 (H) 06/24/2025    BUN 66.2 (H) 06/23/2025    CR 6.73 (H) 06/24/2025    CR 10.79 (H) 06/23/2025     (H) 06/24/2025    GLC 98 06/23/2025     COAGS:   Lab Results   Component Value Date    PTT 33 04/27/2024    INR 1.49 (H) 06/24/2025    FIBR 442 10/24/2023     POC:   Lab Results   Component Value Date     (H) 09/28/2016     HEPATIC:   Lab Results   Component Value Date    ALBUMIN 4.2 03/18/2025    PROTTOTAL 8.0 03/18/2025    ALT 15 03/18/2025    AST 21 03/18/2025    ALKPHOS 214 (H) 03/18/2025    BILITOTAL 0.7 03/18/2025    CHANDLER 37 10/30/2023     OTHER:   Lab Results   Component Value Date    PH 7.30 (L) 03/05/2023    LACT 0.4 10/11/2023    A1C 5.0 01/11/2024    KELLY 8.9 06/24/2025    PHOS 5.5 (H) 06/22/2025    MAG 2.2 06/22/2025    LIPASE 24 10/13/2023    AMYLASE 72 09/22/2016    TSH 4.89 (H) 03/25/2023    T4 0.92 03/25/2023    CRP 22.3 (H) 11/22/2022       Anesthesia Plan    ASA Status:  3      NPO Status: NPO Appropriate   Anesthesia Type: General.  Airway: supraglottic airway.  Induction: intravenous.  Maintenance: Balanced.   Techniques and Equipment:     - Airway:  Planned airway equipment includes supraglottic airway.     - Monitoring Plan: standard ASA monitoring     Consents    Anesthesia Plan(s) and associated risks, benefits, and realistic alternatives discussed. Questions answered and patient/representative(s) expressed understanding.     - Discussed: anesthesiologist, CRNA     - Discussed with:  Patient               Postoperative Care    Pain  management: multimodal analgesia.     Comments:                 Kenneth Dockery MD    I have reviewed the pertinent notes and labs in the chart from the past 30 days and (re)examined the patient.  Any updates or changes from those notes are reflected in this note.    Clinically Significant Risk Factors Present on Admission         # Hyponatremia: Lowest Na = 128 mmol/L in last 2 days, will monitor as appropriate  # Hypochloremia: Lowest Cl = 91 mmol/L in last 2 days, will monitor as appropriate       # Drug Induced Coagulation Defect: home medication list includes an anticoagulant medication  # Drug Induced Platelet Defect: home medication list includes an antiplatelet medication   # Hypertension: Noted on problem list      # Anemia: based on hgb <11       # Overweight: Estimated body mass index is 27.97 kg/m  as calculated from the following:    Height as of this encounter: 1.829 m (6').    Weight as of this encounter: 93.5 kg (206 lb 3.2 oz).       # Financial/Environmental Concerns:

## 2025-06-24 NOTE — PROGRESS NOTES
VASCULAR SURGERY     I visited with patient this morning.  Right jugular tunneled catheter working well with dialysis last evening.    Hematoma developed in left arm but is very stable.  He has a pulse within the aneurysmal portion of the upper arm fistula but no flow by Doppler.    Hoping that we can perform aneurysmorrhaphy and back to me and preserve left upper arm fistula.  However, will be a minimum of 4 weeks before this can be used again and thus tunneled catheter would be necessary.    We answered all of his questions again this morning--I did see him at the time of the fistulogram last evening also.         Deejay Mcneil MD

## 2025-06-24 NOTE — PROGRESS NOTES
6/24/2025  7:00 AM-3:30 PM  Goal Outcome Evaluation:  PRIMARY Concern:  SOB and L arm fistula clot  SAFETY RISK Concerns (fall risk, behaviors, etc.): none      Aggression Tool Color: green  Isolation/Type: none  Tests/Procedures for NEXT shift:  AM labs,   Consults? (Pending/following, signed-off?): Vascular surg and nephrology following  Where is patient from? (Home, TCU, etc.): home  Other Important info for NEXT shift : Scheduled for AVF revision at 1600 PM with Dr Mcneil, report given to pre op RN  Anticipated DC date & active delays:  Possibly tomorrow after Dialysis, normally scheduled MWF  _____________________________________________________________________________  SUMMARY NOTE:  Orientation/Cognitive: A&Ox4  Mobility Level/Assist Equipment: independent   Antibiotics & Plan (IV/po, length of tx left):  NA  Pain Management: Oxy and tylenol for L arm pain with good relief  Tele/VS/O2: VSS on RA  ABNL Lab/BG: Hgb of 8.8, PLT 86, INR 1.49, PT 17.6, Crt pf 6.73  Diet: NPO  Bowel/Bladder: Anuric and on HD, continent otherwise  Skin Concerns:  Left arm hematoma from fistula site and  R CVC, scattered bruising  Drains/Devices: PIV SLed  Patient Stated Goal for Today: Get over with the procedure

## 2025-06-24 NOTE — PROGRESS NOTES
Observation goals  PRIOR TO DISCHARGE        Comments: -diagnostic tests and consults completed and resulted: not met  -vital signs normal or at patient baseline: not met  -tolerating oral intake to maintain hydration: not met  -returns to baseline functional status: not met  -safe disposition plan has been identified: met  - IR/Nephro consults, HD plan: not met  Nurse to notify provider when observation goals have been met and patient is ready for discharge.

## 2025-06-24 NOTE — PROGRESS NOTES
Potassium   Date Value Ref Range Status   06/23/2025 4.6 3.4 - 5.3 mmol/L Final   12/29/2022 3.4 3.4 - 5.3 mmol/L Final   04/20/2020 3.9 3.4 - 5.3 mmol/L Final     Potassium POCT   Date Value Ref Range Status   10/05/2023 3.6 3.4 - 5.3 mmol/L Final     Hemoglobin   Date Value Ref Range Status   06/22/2025 9.4 (L) 13.3 - 17.7 g/dL Final   04/20/2020 14.3 13.3 - 17.7 g/dL Final     Creatinine   Date Value Ref Range Status   06/23/2025 10.79 (H) 0.67 - 1.17 mg/dL Final   04/20/2020 1.06 0.66 - 1.25 mg/dL Final     Urea Nitrogen   Date Value Ref Range Status   06/23/2025 66.2 (H) 8.0 - 23.0 mg/dL Final   12/29/2022 46 (H) 7 - 30 mg/dL Final   04/20/2020 23 7 - 30 mg/dL Final     Sodium   Date Value Ref Range Status   06/23/2025 133 (L) 135 - 145 mmol/L Final   04/20/2020 139 133 - 144 mmol/L Final     INR   Date Value Ref Range Status   06/23/2025 1.31 (H) 0.85 - 1.15 Final   10/07/2019 1.20 (H) 0.86 - 1.14 Final       Hemodialysis Treatment Note     Target weight loss (kg): 3 kg     Approximate (Net) weight removed (mL): 3000 mL     Pre-Vascular Access Status: Right CVC placed 6/23 in IR. Dressing changed and site care done per hospital protocol. Site clean and dry, no s/s of infection. Both ports aspirated and flushed easily. 400 BFR.     Dialyzer rinse: Streaked     Dialyzer: Elisio 15H     Total Blood Volume Processed (BVP): 82.3     Total dialysis (treatment) time (hours): 3.5 hours     Post-Vascular Access Status: Heparin 1000 units/ml instilled each port to fill volumes. Arterial port 1.9 mL; Venous port 1.9 mL.Capped and locked. Dressing clean, dry and intact.      Medications given: 10 mg Midodrine given by primary RN pre treatment.      Run Summary: Hepatitis status of previous patient on machine log was checked and verified ok to use with this patients hepatitis status. Consent verified and on file. Pt educated on procedure and agreeable to treatment.   K2; 3.5 hour treatment completed. 3000 mL UF removed  using profile 2. A/O x 3, denies chest pain. Denies SOB on room air. Requested to be put on 2L of oxygen via nasal canula at start of treatment for comfort. See HD flowsheet for all data. Report given to primary RN.      Interventions: Monitor VS q 15 minutes and PRN. Patient repositioned every 2 hours during the treatment. Goal adjustment per hemodynamics. Machine water alarm in place and functioning. Transducer pods intact and checked every 15min.      Plan: Per renal    Yasmeen Noel RN on 6/23/2025 at 11:31 PM  [facility]

## 2025-06-24 NOTE — PROGRESS NOTES
diagnostic tests and consults completed and resulted not met, plan for fistula revision this evening, Vas surgery following  -vital signs normal or at patient baseline met  -tolerating oral intake to maintain hydration met  -returns to baseline functional status met  -safe disposition plan has been identified met  - IR/Nephro consults, HD plan pending

## 2025-06-24 NOTE — PROGRESS NOTES
[facility]    Medicine Progress Note - Hospitalist Service    Date of Admission:  6/22/2025    Assessment & Plan   Blanco Osborne is a 60 year old male with history of ESRD on dialysis MWF, s/p liver transplant, hypertension, hyperlipidemia, GERD, CHRISTIAN on CPAP, RLS, and seizures among others who presented to the ED with complaints of mild shortness of breath.  Patient missed his last planned dialysis run this past Friday due to his fistula not working and noted concern of clot.  Thrill is not palpable in ED.  He has noted some dull chest discomfort and has felt winded with activity in recent days.  He denies any new edema in his legs but does feel some fullness in his abdomen.  He follows with Dr. Bradshaw-he was unable to get IR scheduled for fistulogram on Friday / over the weekend.     Clot in dialysis fistula  Missed dialysis  ESRD on HD MWF - follows with Dr Bradshaw  Patient was out of town missed last HD run on Friday-unable to get HD run as fistula was not functioning when there was concern of clot.  No thrill noted on exam upon admission but distal pulse in LUE is noted.  Some shortness of breath with activity and has had mild dull chest discomfort, but trop is flat, and EKG without overt acute ischemic changes. VSS in ED.   CXR on admission fairly bland (see below).   Stable on RA. K WNL at 4.8.   *underwent fistulogram with IR 6/23 with large thrombus noted and ultimately inability to keep fistula open  *tunnel cath placed via IR 6/23  - nephrology following, had inpatient dialysis run 6/23  --vascular surgery following, plan for fistula revision 6/24  - npo at 1100  - continue work up with [facility] for kidney transplant list  - resume warfarin pending surgical plan  --notably has hx hypotension on dialysis days, continue PTA midodrine scheduled on dialysis days and prn for low pressures  --increase prn oxy frequency to q4 hours prn    Hx of liver transplant 2016 - continue PTA tacrolimus      CHRISTIAN on CPAP   GERD - PTA PPI   RLS - PTA ropinirole   Seizures - continue PTA vimpat   Hx of atrial fib with RVR (reportedly one brief episode when hospitalized previously) - has been on warfarin but seems to stop it on his own periodically; he reports [facility] transplant team has considered discontinuing his AC. Would recommend continuing regimen and follow up with outpatient team for ongoing mgmt / consideration of discontinuing etc.     Diet:  npo   DVT Prophylaxis: Pneumatic Compression Devices, will resume warfarin post procedure pending findings   Nixon Catheter: Not present  Lines: PRESENT             Cardiac Monitoring: None  Code Status:  Full Code       Observation Goals: -diagnostic tests and consults completed and resulted, -vital signs normal or at patient baseline, -tolerating oral intake to maintain hydration, -returns to baseline functional status, -safe disposition plan has been identified, - IR/Nephro consults, HD plan, Nurse to notify provider when observation goals have been met and patient is ready for discharge.  Diet: Clear Liquid Diet  NPO for Procedure/Surgery per Anesthesia Guidelines Except for: Meds; Clear liquids before procedure/surgery: ADULT (Age GREATER than or Equal to 18 years) - Clear liquids 2 hours before procedure/surgery    DVT Prophylaxis: Pneumatic Compression Devices  Nixon Catheter: Not present  Lines: PRESENT      CVC Double Lumen Right Internal jugular Tunneled-Site Assessment: WDL      Cardiac Monitoring: ACTIVE order. Indication: Chest pain/ ACS rule out (24 hours)  Code Status: Full Code      Clinically Significant Risk Factors Present on Admission         # Hyponatremia: Lowest Na = 128 mmol/L in last 2 days, will monitor as appropriate  # Hypochloremia: Lowest Cl = 91 mmol/L in last 2 days, will monitor as appropriate       # Drug Induced Coagulation Defect: home medication list includes an anticoagulant medication  # Drug Induced Platelet Defect: home medication  list includes an antiplatelet medication   # Hypertension: Noted on problem list           # Overweight: Estimated body mass index is 28.29 kg/m  as calculated from the following:    Height as of this encounter: 1.829 m (6').    Weight as of this encounter: 94.6 kg (208 lb 8.9 oz).       # Financial/Environmental Concerns:           Social Drivers of Health    Alcohol Use: Unknown (10/7/2019)    AUDIT-C     Frequency of Alcohol Consumption: Monthly or less   Physical Activity: Insufficiently Active (4/18/2025)    [facility] from [facility]    Exercise Vital Sign     Days of Exercise per Week: 2 days     Minutes of Exercise per Session: 10 min          Disposition Plan     Medically Ready for Discharge: Anticipated Tomorrow         The patient's care was discussed with Dr. Mary who agrees with the above plan     Lindsay Archibald PA-C  Hospitalist Service  [facility]  Securely message with [employer] (more info)  Text page via [employer] Paging/Directory   ______________________________________________________________________    Interval History   Doing ok this am. He's having some pain in his left arm after the procedure, oxy help but only q6 hours. SOB, chest tightness, abdominal distension much improved. Requesting protein gelatin.     Physical Exam   Temp: 98.3  F (36.8  C) Temp src: Oral BP: (!) 87/50 Pulse: 93   Resp: 16 SpO2: 93 % O2 Device: None (Room air) Oxygen Delivery: 2 LPM  Vitals:    06/23/25 1445 06/24/25 0400   Weight: 95.7 kg (211 lb) 94.6 kg (208 lb 8.9 oz)     Vital Signs with Ranges  Temp:  [97.4  F (36.3  C)-98.3  F (36.8  C)] 98.3  F (36.8  C)  Pulse:  [] 93  Resp:  [9-32] 16  BP: ()/() 87/50  SpO2:  [90 %-100 %] 93 %  I/O last 3 completed shifts:  In: 0   Out: 3000 [Other:3000]    Constitutional: Alert and oriented, laying down in bed. Appears comfortable and is appropriately conversant   ENT: moist mucous membranes  Respiratory: Lungs clear to  auscultation bilaterally. No increased WOB   Cardiovascular: Regular rate and rhythm, no significant LE edema   GI: active bowel sounds, abdomen soft, distension improved.  Non tender   MSK: moves all four extremities.   Neuro: speech is clear. Face symmetric. Follows commands   Extremities: LUE with no palpable thrill, increased overall edema LUE       Medical Decision Making       45 MINUTES SPENT BY ME on the date of service doing chart review, history, exam, documentation & further activities per the note.      Data     I have personally reviewed the following data over the past 24 hrs:    INR:  1.49 (H) PTT:  N/A   D-dimer:  N/A Fibrinogen:  N/A       Imaging results reviewed over the past 24 hrs:   Recent Results (from the past 24 hours)   IR CVC Tunnel Placement > 5 Yrs of Age    Narrative    [facility]  LOCATION: [facility]  DATE: 6/23/2025    PROCEDURE:   1. Left upper extremity fistula declot  2. Tunneled dialysis catheter placement    INTERVENTIONAL RADIOLOGIST: Héctor Mckinnon MD.    INDICATION: 60-year-old man with renal failure, left arm brachiocephalic fistula. Fistula is thrombosed, presents for declot, possible tunnel catheter placement..    CONSENT: The risks, benefits and alternatives of dialysis arteriovenous shunt evaluation with possible angioplasty, stent placement, thrombolysis and/or tunneled dialysis catheter placement were discussed with the patient in detail. All questions were   answered. Informed consent was given to proceed with the procedure.    MODERATE SEDATION: Versed 2 mg IV; Fentanyl 125 mcg IV.  During the timeout, immediately prior to the administration of medications, the patient was reassessed for adequacy to receive conscious sedation. Under physician supervision, Versed and fentanyl   were administered for moderate sedation. Pulse oximetry, heart rate and blood pressure were continuously monitored by an independent trained observer. The  physician spent 97 minutes of face-to-face sedation time with the patient.    CONTRAST: 60 mL intravenous  ANTIBIOTICS: None.  ADDITIONAL MEDICATIONS: Heparin 6000 U IV  estimated blood loss: 200 mL    FLUOROSCOPIC TIME: 16.5 minutes.  RADIATION DOSE: Air Kerma: 26.7 mGy.    COMPLICATIONS: No immediate complications.    STERILE BARRIER TECHNIQUE: Maximum sterile barrier technique was used. Cutaneous antisepsis was performed at the operative site with application of 2% chlorhexidine and large sterile drape. Prior to the procedure, the  and assistant performed   hand hygiene and wore hat, mask, sterile gown, and sterile gloves during the entire procedure.    PROCEDURE: Prior to the procedure the dialysis access was evaluated with ultrasound. Images saved to the permanent patient medical record.    The skin overlying the arterial limb of the fistula was accessed under ultrasound guidance in an antegrade direction. Access was secured using a 6 Hong Konger vascular sheath. A catheter and guidewire were navigated through the occluded fistula into left   subclavian vein. Venogram was performed to evaluate the central veins. Pullback venogram was then performed to evaluate the distal extent of the fistula clot. A 5 Hong Konger, 20 cm Jesse Socrates infusion length catheter was then advanced over the guidewire.   Through the infusion catheter, 6 mg of TPA was pulse sprayed into the thrombosed aneurysm. Stiff guidewire was then advanced into the IVC. The thrombus within the occluded fistula was macerated with an 8 mm balloon. Repeat fistula gram was performed   which showed persistent occlusion of the fistula.    Decision was made to perform thrombectomy through debulk the thrombus within the aneurysmal fistula. The 6 Hong Konger sheath was exchanged for a short 8 Hong Konger sheath. The 8 Hong Konger AngioJet device was then advanced over a guidewire into the thrombosed   fistula. Multiple passes of thrombectomy was performed throughout the  thrombosed fistula with removal of a large volume of thrombus. The fistula was angioplastied up to 12 mm. Repeat fistulogram was performed.    At this point, although a large amount of thrombus was removed flow through the fistula remains poor with residual thrombus within the aneurysmal fistula. Discussed with Dr. Mcneil in vascular surgery, decision made to place a tunneled dialysis catheter.   He will consider surgical revision of the fistula. The sheath was then removed. Pursestring suture was placed around the access site using absorbable suture. Patient developed a subcutaneous hematoma from the fistula access after the sheath was removed   which extended in the proximal forearm. Hemostasis was achieved with prolonged manual pressure. The forearm hematoma remains stable in size during dialysis catheter placement. He had some pain at the hematoma site but no symptoms in his hand.    Attention was turned to tunneled dialysis catheter placement after failed declot. Limited right neck ultrasound demonstrated a patent internal jugular vein; images saved of the permanent patient medical record. The overlying skin and subcutaneous soft   tissues were anesthetized using 1% lidocaine.    Under ultrasound guidance, the right internal jugular vein was accessed using a micropuncture needle. Access was secured using a 4 Bangladeshi transitional sheath. A guidewire was advanced centrally.    Our attention was turned to the chest wall. The overlying skin and subcutaneous soft tissues were anesthetized using 1% lidocaine with epinephrine. A 3 mm transverse incision was made. The catheter tubing was tunneled in an antegrade fashion from the   chest wall incision to the neck dermatotomy site.     Using this access, a 23 cm tip to cuff palindrome dialysis catheter was advanced under fluoroscopic guidance until the tip was in the proximal right atrium. The catheter was tested and found to flush and aspirate appropriately. The catheter  was   heparinized and secured to the skin. The neck dermatotomy was closed with absorbable suture and skin glue. A purse string suture was placed at the catheter exit site using absorbable suture.       FINDINGS:  Ultrasound evaluation of the left upper extremity fistula demonstrates thrombosed, aneurysmal left upper extremity fistula. The juxta anastomotic fistula was patent. The juxta anastomotic fistula was accessed.    Intial left upper extremity fistulogram demonstrates the juxta anastomotic fistula and arterial anastomosis are patent. The cannulating segment of the fistula is aneurysmal and thrombosed. The fistula venous outflow is thrombosed to the level of the   upper arm. The left upper extremity central veins are widely patent.    Pharmacologic and mechanical thrombectomy of the left upper extremity fistula and aspiration thrombectomy with the apex Estonian AngioJet device resulted in deep bulking of the thrombus within the fistula and restoration of some flow through the fistula.   However at the conclusion of the procedure there is residual thrombus within the aneurysmal fistula and flow is likely inadequate for long-term patency.    A subcutaneous hematoma developed around the left elbow/proximal forearm following sheath removal. This was stable following prolonged manual pressure. Bruising and pain at this site over the next few days as expected..       23 cm cuff to tip tunneled dialysis catheter placed via the right internal jugular vein with the tip in the right atrium.      Impression    IMPRESSION:      1. Thrombosed, aneurysmal left upper extremity brachiocephalic fistula.  2. Unsuccessful left upper extremity fistula declot. The thrombus within the fistula was debulked and some flow was restored, however unable to restore sufficient flow for long-term patency.  3. Right chest tunneled dialysis catheter placed with the tip in the right atrium.    PLAN:  1.  Patient to be recovered on the inpatient  floor.   2.  Tunneled dialysis catheter is ready for immediate use.    IR Dialysis Fistulogram Left    Narrative    [facility]  LOCATION: [facility]  DATE: 6/23/2025    PROCEDURE:   1. Left upper extremity fistula declot  2. Tunneled dialysis catheter placement    INTERVENTIONAL RADIOLOGIST: Héctor Mckinnon MD.    INDICATION: 60-year-old man with renal failure, left arm brachiocephalic fistula. Fistula is thrombosed, presents for declot, possible tunnel catheter placement..    CONSENT: The risks, benefits and alternatives of dialysis arteriovenous shunt evaluation with possible angioplasty, stent placement, thrombolysis and/or tunneled dialysis catheter placement were discussed with the patient in detail. All questions were   answered. Informed consent was given to proceed with the procedure.    MODERATE SEDATION: Versed 2 mg IV; Fentanyl 125 mcg IV.  During the timeout, immediately prior to the administration of medications, the patient was reassessed for adequacy to receive conscious sedation. Under physician supervision, Versed and fentanyl   were administered for moderate sedation. Pulse oximetry, heart rate and blood pressure were continuously monitored by an independent trained observer. The physician spent 97 minutes of face-to-face sedation time with the patient.    CONTRAST: 60 mL intravenous  ANTIBIOTICS: None.  ADDITIONAL MEDICATIONS: Heparin 6000 U IV  estimated blood loss: 200 mL    FLUOROSCOPIC TIME: 16.5 minutes.  RADIATION DOSE: Air Kerma: 26.7 mGy.    COMPLICATIONS: No immediate complications.    STERILE BARRIER TECHNIQUE: Maximum sterile barrier technique was used. Cutaneous antisepsis was performed at the operative site with application of 2% chlorhexidine and large sterile drape. Prior to the procedure, the  and assistant performed   hand hygiene and wore hat, mask, sterile gown, and sterile gloves during the entire procedure.    PROCEDURE: Prior to the  procedure the dialysis access was evaluated with ultrasound. Images saved to the permanent patient medical record.    The skin overlying the arterial limb of the fistula was accessed under ultrasound guidance in an antegrade direction. Access was secured using a 6 Macedonian vascular sheath. A catheter and guidewire were navigated through the occluded fistula into left   subclavian vein. Venogram was performed to evaluate the central veins. Pullback venogram was then performed to evaluate the distal extent of the fistula clot. A 5 Macedonian, 20 cm [employer] infusion length catheter was then advanced over the guidewire.   Through the infusion catheter, 6 mg of TPA was pulse sprayed into the thrombosed aneurysm. Stiff guidewire was then advanced into the IVC. The thrombus within the occluded fistula was macerated with an 8 mm balloon. Repeat fistula gram was performed   which showed persistent occlusion of the fistula.    Decision was made to perform thrombectomy through debulk the thrombus within the aneurysmal fistula. The 6 Macedonian sheath was exchanged for a short 8 Macedonian sheath. The 8 Macedonian AngioJet device was then advanced over a guidewire into the thrombosed   fistula. Multiple passes of thrombectomy was performed throughout the thrombosed fistula with removal of a large volume of thrombus. The fistula was angioplastied up to 12 mm. Repeat fistulogram was performed.    At this point, although a large amount of thrombus was removed flow through the fistula remains poor with residual thrombus within the aneurysmal fistula. Discussed with Dr. Mcneil in vascular surgery, decision made to place a tunneled dialysis catheter.   He will consider surgical revision of the fistula. The sheath was then removed. Pursestring suture was placed around the access site using absorbable suture. Patient developed a subcutaneous hematoma from the fistula access after the sheath was removed   which extended in the proximal forearm.  Hemostasis was achieved with prolonged manual pressure. The forearm hematoma remains stable in size during dialysis catheter placement. He had some pain at the hematoma site but no symptoms in his hand.    Attention was turned to tunneled dialysis catheter placement after failed declot. Limited right neck ultrasound demonstrated a patent internal jugular vein; images saved of the permanent patient medical record. The overlying skin and subcutaneous soft   tissues were anesthetized using 1% lidocaine.    Under ultrasound guidance, the right internal jugular vein was accessed using a micropuncture needle. Access was secured using a 4 Czech transitional sheath. A guidewire was advanced centrally.    Our attention was turned to the chest wall. The overlying skin and subcutaneous soft tissues were anesthetized using 1% lidocaine with epinephrine. A 3 mm transverse incision was made. The catheter tubing was tunneled in an antegrade fashion from the   chest wall incision to the neck dermatotomy site.     Using this access, a 23 cm tip to cuff palindrome dialysis catheter was advanced under fluoroscopic guidance until the tip was in the proximal right atrium. The catheter was tested and found to flush and aspirate appropriately. The catheter was   heparinized and secured to the skin. The neck dermatotomy was closed with absorbable suture and skin glue. A purse string suture was placed at the catheter exit site using absorbable suture.       FINDINGS:  Ultrasound evaluation of the left upper extremity fistula demonstrates thrombosed, aneurysmal left upper extremity fistula. The juxta anastomotic fistula was patent. The juxta anastomotic fistula was accessed.    Intial left upper extremity fistulogram demonstrates the juxta anastomotic fistula and arterial anastomosis are patent. The cannulating segment of the fistula is aneurysmal and thrombosed. The fistula venous outflow is thrombosed to the level of the   upper arm. The  left upper extremity central veins are widely patent.    Pharmacologic and mechanical thrombectomy of the left upper extremity fistula and aspiration thrombectomy with the apex Thai AngioJet device resulted in deep bulking of the thrombus within the fistula and restoration of some flow through the fistula.   However at the conclusion of the procedure there is residual thrombus within the aneurysmal fistula and flow is likely inadequate for long-term patency.    A subcutaneous hematoma developed around the left elbow/proximal forearm following sheath removal. This was stable following prolonged manual pressure. Bruising and pain at this site over the next few days as expected..       23 cm cuff to tip tunneled dialysis catheter placed via the right internal jugular vein with the tip in the right atrium.      Impression    IMPRESSION:      1. Thrombosed, aneurysmal left upper extremity brachiocephalic fistula.  2. Unsuccessful left upper extremity fistula declot. The thrombus within the fistula was debulked and some flow was restored, however unable to restore sufficient flow for long-term patency.  3. Right chest tunneled dialysis catheter placed with the tip in the right atrium.    PLAN:  1.  Patient to be recovered on the inpatient floor.   2.  Tunneled dialysis catheter is ready for immediate use.

## 2025-06-24 NOTE — PROGRESS NOTES
AVF revision planned for 1600 today by Dr. Mcneil.    I plan dialysis tomorrow.    Kb Moulton MD

## 2025-06-24 NOTE — OP NOTE
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  Thrombectomy left upper arm arteriovenous fistula     #2.  Repair of fistula aneurysms x 2     #3.  Interoperative duplex ultrasound      SURGEON:  Deejay Mcneil M.D.      ASSISTANT:  Shyanne Guido MD  (vascular fellow)      ANESTHESIA:  General      PRE-OP MEDICATIONS: Ancef 2 g IV      INDICATIONS FOR PROCEDURE: 60-year-old patient on chronic hemodialysis has a left upper arm proximal radial to cephalic-basilic arteriovenous fistula.  He has developed progressive aneurysmal dilatation presented with a thrombosed fistula.  Attempts at the interventional radiology thrombectomy and angioplasty were unsuccessful.  Tunnel catheters been placed for dialysis.  Presents today for thrombectomy and repair of aneurysms versus total revision.  He has redeveloped a pulse within the fistula and Doppler flow that was not present earlier today implying that there is some improvement but with the extensive thrombus revision was definitely indicated.  Risk benefits reviewed with patient and his brother.      DESCRIPTION OF PROCEDURE: In general manage patient was brought the op room placed supine.  Induced under general anesthesia and LMA was placed.      DUPLEX ULTRASOUND: Intraoperative duplex was used.  We confirmed that the axillary outflow vein was widely patent measuring 8 to 9 mm.  There was also patency with no aneurysmal dilatation approximately 5 cm from the outflow anastomosis to the axillary/brachial vein.  However, as we approached the antecubital area there was a large amount of wall thrombus and very little flow noted.  The anastomosis itself was widely patent.  There is no evidence of stenosis throughout the entire fistula--note angioplasty was performed with interventional radiology last evening.      THROMBECTOMY: Entire left arm  was prepped and draped.  Timeout was called and the sites were identified.  #15 blade scalpel was used to make a 15 cm incision from the mid upper arm mild aneurysm to the distal upper arm antecubital aneurysm.  We dissected down towards the anastomosis.  This measured approximately 8 mm in diameter and this was encircled with Silastic Vesseloops as was the outflow vein beyond the aneurysmal change.  We then totally mobilized the fistula from the antecubital to the proximal third of the upper arm.       3000 units intravenous heparin were given.  After 3 minutes of Vesseloops were sequentially tightened.  #11 blade was used to enter the larger distal upper arm and antecubital aneurysm.  This was extended with the Reyes scissors till there was an narrowing.  A large amount of organized thrombus was removed.  We inspected the inflow vessel and this was also widely patent with excellent flow and heparinized saline solution was injected.  There was a nonanginal portion in the mid upper arm but this did except a 10 mm dilator.  We opened up the more proximal aneurysm and a small amount of thrombus was removed.  The outflow tract was widely patent and easily excepted an 8 mm dilator with heparinized saline solution being placed.      ANEURYSMORRHAPHY: The mid upper arm aneurysm was closed primarily which slightly narrowed this down to approximately 10 mm in diameter with running 5-0 Prolene suture.  The larger aneurysm had a portion of the anterior wall excised and this again was closed with a running 5-0 Prolene suture.  Upon completion release of the Vesseloops had a strong pulse within the fistula and excellent hemostasis at the 2 repair sites.    Overlying skin did have a small amount of excision but otherwise with very healthy and well-perfused.  Subcutaneous tissue approximated with interrupted 3-0 Vicryl and the skin was closed with 4-0 Monocryl in a subcuticular fashion.      The wounds were infiltrated with 0.5%  Marcaine.  Steri-Strips were applied followed by gauze and Kaden dressing.    Patient tolerated procedure well.  Needle and sponge counts correct x 2.          EBL: 25 mL      COMPLICATIONS: None      OPERATIVE FINDINGS: Essentially thrombosed aneurysm closer to the antecubital anastomosis with only mild thrombus in the mid upper arm aneurysm.  Excellent inflow and outflow.          Deejay Mcneil MD

## 2025-06-24 NOTE — PROGRESS NOTES
MD Notification    Notified Person: MD    Notified Person Name: Alfredo Dhaliwal    Notification Date/Time: 0150 6/24/25    Notification Interaction: Vocera Page    Purpose of Notification: Low BP, patient requesting midodrine    Orders Received: One time order of Midodrine 10 mg    Comments: medication administered

## 2025-06-24 NOTE — ANESTHESIA CARE TRANSFER NOTE
Patient: Blanco Osborne    Procedure: Procedure(s):  Thrombectomy upper extremity, repair of aneurysm, left upper arm       Diagnosis: Clotted dialysis access, initial encounter [T82.49XA]  Dialysis patient [Z99.2]  Diagnosis Additional Information: No value filed.    Anesthesia Type:   General     Note:    Oropharynx: oropharynx clear of all foreign objects and spontaneously breathing  Level of Consciousness: drowsy  Oxygen Supplementation: nasal cannula  Level of Supplemental Oxygen (L/min / FiO2): 2  Independent Airway: airway patency satisfactory and stable  Dentition: dentition unchanged  Vital Signs Stable: post-procedure vital signs reviewed and stable  Report to RN Given: handoff report given  Patient transferred to: PACU  Comments: Pt exhibits spontaneous respirations, follows commands, suctioned, LMA removed, exchanging well, transferred to pacu with O2 @ 2L via NC, all monitors and alarms on, VSS, patent IV, report and transfer of care to RN.    Handoff Report: Identifed the Patient, Identified the Reponsible Provider, Reviewed the pertinent medical history, Discussed the surgical course, Reviewed Intra-OP anesthesia mangement and issues during anesthesia, Set expectations for post-procedure period and Allowed opportunity for questions and acknowledgement of understanding      Vitals:  Vitals Value Taken Time   /39 06/24/25 18:00   Temp 37.2  C (98.96  F) 06/24/25 18:02   Pulse 75 06/24/25 18:05   Resp 11 06/24/25 18:05   SpO2 99 % 06/24/25 18:05   Vitals shown include unfiled device data.    Electronically Signed By: STEFFANY Enamorado CRNA  June 24, 2025  6:06 PM

## 2025-06-24 NOTE — PLAN OF CARE
Goal Outcome Evaluation:  PRIMARY Concern:  SOB and L arm fistula clot  SAFETY RISK Concerns (fall risk, behaviors, etc.): none      Aggression Tool Color: green  Isolation/Type: none  Tests/Procedures for NEXT shift:  AM labs,   Consults? (Pending/following, signed-off?): Vascular surg  Where is patient from? (Home, TCU, etc.): home  Other Important info for NEXT: Left arm hematoma, Pulses present. BP low, provider paged and given one dose of midodrine  Anticipated DC date & active delays: TBD  _____________________________________________________________________________  SUMMARY NOTE:   Orientation/Cognitive: A&Ox4  Mobility Level/Assist Equipment: independent   Antibiotics & Plan (IV/po, length of tx left):  NA  Pain Management: denies pain, Tele/VS/O2: VSS on RA  ABNL Lab/BG: INR 1.31  Diet: NPO  Bowel/Bladder: continent of bladder, anuric  Skin Concerns:  Left arm hematoma from fistula site and  R CVC  Drains/Devices: PIV SL  Patient Stated Goal for Today: Pian  management          Observation goals  PRIOR TO DISCHARGE        Comments: -diagnostic tests and consults completed and resulted: not met  -vital signs normal or at patient baseline: not met  -tolerating oral intake to maintain hydration: not met  -returns to baseline functional status: not met  -safe disposition plan has been identified: met  - IR/Nephro consults, HD plan: not met  Nurse to notify provider when observation goals have been met and patient is ready for discharge.

## 2025-06-24 NOTE — OR NURSING
Paged HUDSON Monica to notified BP's treading  down  80's/50's. No intervention at this time. Pt ok to be transfer to his hospital room. Marta Guido MD, Assisting at bedside. Pt left radial pulse unable to be palpated , doppler was used to find pulse  +2 . Left arm also swelling, MD assisting no concern, swelling will be expected.

## 2025-06-25 ENCOUNTER — TELEPHONE (OUTPATIENT)
Dept: ANTICOAGULATION | Facility: CLINIC | Age: 60
End: 2025-06-25
Payer: COMMERCIAL

## 2025-06-25 VITALS
HEART RATE: 80 BPM | SYSTOLIC BLOOD PRESSURE: 90 MMHG | WEIGHT: 206.2 LBS | RESPIRATION RATE: 16 BRPM | BODY MASS INDEX: 27.93 KG/M2 | HEIGHT: 72 IN | TEMPERATURE: 98.2 F | OXYGEN SATURATION: 94 % | DIASTOLIC BLOOD PRESSURE: 60 MMHG

## 2025-06-25 DIAGNOSIS — I63.10 CEREBROVASCULAR ACCIDENT (CVA) DUE TO EMBOLISM OF PRECEREBRAL ARTERY (H): ICD-10-CM

## 2025-06-25 DIAGNOSIS — I48.91 ATRIAL FIBRILLATION WITH RAPID VENTRICULAR RESPONSE (H): Primary | ICD-10-CM

## 2025-06-25 LAB
ANION GAP SERPL CALCULATED.3IONS-SCNC: 16 MMOL/L (ref 7–15)
BUN SERPL-MCNC: 46.4 MG/DL (ref 8–23)
CALCIUM SERPL-MCNC: 8.5 MG/DL (ref 8.8–10.4)
CHLORIDE SERPL-SCNC: 91 MMOL/L (ref 98–107)
CREAT SERPL-MCNC: 7.96 MG/DL (ref 0.67–1.17)
EGFRCR SERPLBLD CKD-EPI 2021: 7 ML/MIN/1.73M2
GLUCOSE SERPL-MCNC: 130 MG/DL (ref 70–99)
HCO3 SERPL-SCNC: 24 MMOL/L (ref 22–29)
HGB BLD-MCNC: 7.3 G/DL (ref 13.3–17.7)
INR PPP: 1.27 (ref 0.85–1.15)
MCV RBC AUTO: 108 FL (ref 78–100)
POTASSIUM SERPL-SCNC: 4.3 MMOL/L (ref 3.4–5.3)
PROTHROMBIN TIME: 15.6 SECONDS (ref 11.8–14.8)
SODIUM SERPL-SCNC: 131 MMOL/L (ref 135–145)

## 2025-06-25 PROCEDURE — 90935 HEMODIALYSIS ONE EVALUATION: CPT | Performed by: INTERNAL MEDICINE

## 2025-06-25 PROCEDURE — 250N000012 HC RX MED GY IP 250 OP 636 PS 637: Performed by: PHYSICIAN ASSISTANT

## 2025-06-25 PROCEDURE — 99239 HOSP IP/OBS DSCHRG MGMT >30: CPT | Mod: FS | Performed by: HOSPITALIST

## 2025-06-25 PROCEDURE — 90935 HEMODIALYSIS ONE EVALUATION: CPT

## 2025-06-25 PROCEDURE — 250N000011 HC RX IP 250 OP 636: Performed by: INTERNAL MEDICINE

## 2025-06-25 PROCEDURE — 250N000013 HC RX MED GY IP 250 OP 250 PS 637: Performed by: PHYSICIAN ASSISTANT

## 2025-06-25 PROCEDURE — 80048 BASIC METABOLIC PNL TOTAL CA: CPT | Performed by: INTERNAL MEDICINE

## 2025-06-25 PROCEDURE — 99239 HOSP IP/OBS DSCHRG MGMT >30: CPT | Performed by: PHYSICIAN ASSISTANT

## 2025-06-25 PROCEDURE — 250N000013 HC RX MED GY IP 250 OP 250 PS 637: Performed by: STUDENT IN AN ORGANIZED HEALTH CARE EDUCATION/TRAINING PROGRAM

## 2025-06-25 PROCEDURE — 250N000011 HC RX IP 250 OP 636: Mod: JW | Performed by: STUDENT IN AN ORGANIZED HEALTH CARE EDUCATION/TRAINING PROGRAM

## 2025-06-25 PROCEDURE — 85610 PROTHROMBIN TIME: CPT | Performed by: INTERNAL MEDICINE

## 2025-06-25 PROCEDURE — 85018 HEMOGLOBIN: CPT | Performed by: INTERNAL MEDICINE

## 2025-06-25 PROCEDURE — 634N000001 HC RX 634: Mod: JZ | Performed by: INTERNAL MEDICINE

## 2025-06-25 PROCEDURE — 258N000003 HC RX IP 258 OP 636: Performed by: INTERNAL MEDICINE

## 2025-06-25 PROCEDURE — P9047 ALBUMIN (HUMAN), 25%, 50ML: HCPCS | Performed by: INTERNAL MEDICINE

## 2025-06-25 PROCEDURE — 250N000013 HC RX MED GY IP 250 OP 250 PS 637: Performed by: INTERNAL MEDICINE

## 2025-06-25 PROCEDURE — 36415 COLL VENOUS BLD VENIPUNCTURE: CPT | Performed by: INTERNAL MEDICINE

## 2025-06-25 RX ORDER — OXYCODONE HYDROCHLORIDE 5 MG/1
5 TABLET ORAL EVERY 4 HOURS PRN
Qty: 10 TABLET | Refills: 0 | Status: SHIPPED | OUTPATIENT
Start: 2025-06-25 | End: 2025-06-25

## 2025-06-25 RX ORDER — OXYCODONE HYDROCHLORIDE 5 MG/1
5 TABLET ORAL EVERY 6 HOURS PRN
Status: ON HOLD | COMMUNITY
Start: 2025-06-25

## 2025-06-25 RX ORDER — OXYCODONE HYDROCHLORIDE 5 MG/1
5-10 TABLET ORAL EVERY 6 HOURS PRN
Qty: 20 TABLET | Refills: 0 | Status: SHIPPED | OUTPATIENT
Start: 2025-06-25 | End: 2025-06-25

## 2025-06-25 RX ORDER — ALBUMIN (HUMAN) 12.5 G/50ML
50 SOLUTION INTRAVENOUS ONCE
Status: COMPLETED | OUTPATIENT
Start: 2025-06-25 | End: 2025-06-25

## 2025-06-25 RX ADMIN — HEPARIN SODIUM 1900 UNITS: 1000 INJECTION, SOLUTION INTRAVENOUS; SUBCUTANEOUS at 10:39

## 2025-06-25 RX ADMIN — PANTOPRAZOLE SODIUM 40 MG: 40 TABLET, DELAYED RELEASE ORAL at 06:40

## 2025-06-25 RX ADMIN — OXYCODONE HYDROCHLORIDE 5 MG: 5 TABLET ORAL at 06:40

## 2025-06-25 RX ADMIN — LACOSAMIDE 50 MG: 50 TABLET, FILM COATED ORAL at 15:22

## 2025-06-25 RX ADMIN — HYDROMORPHONE HYDROCHLORIDE 0.3 MG: 1 INJECTION, SOLUTION INTRAMUSCULAR; INTRAVENOUS; SUBCUTANEOUS at 04:43

## 2025-06-25 RX ADMIN — TACROLIMUS 1 MG: 1 CAPSULE ORAL at 15:22

## 2025-06-25 RX ADMIN — SODIUM CHLORIDE 500 ML: 0.9 INJECTION, SOLUTION INTRAVENOUS at 10:32

## 2025-06-25 RX ADMIN — HEPARIN SODIUM 1900 UNITS: 1000 INJECTION, SOLUTION INTRAVENOUS; SUBCUTANEOUS at 10:40

## 2025-06-25 RX ADMIN — MIDODRINE HYDROCHLORIDE 30 MG: 10 TABLET ORAL at 07:24

## 2025-06-25 RX ADMIN — OXYCODONE HYDROCHLORIDE 5 MG: 5 TABLET ORAL at 01:11

## 2025-06-25 RX ADMIN — EPOETIN ALFA-EPBX 10000 UNITS: 10000 INJECTION, SOLUTION INTRAVENOUS; SUBCUTANEOUS at 11:24

## 2025-06-25 RX ADMIN — CALCITRIOL CAPSULES 0.25 MCG 0.25 MCG: 0.25 CAPSULE ORAL at 15:22

## 2025-06-25 RX ADMIN — ALBUMIN HUMAN 50 G: 0.25 SOLUTION INTRAVENOUS at 10:23

## 2025-06-25 RX ADMIN — OXYCODONE HYDROCHLORIDE 5 MG: 5 TABLET ORAL at 15:22

## 2025-06-25 ASSESSMENT — ACTIVITIES OF DAILY LIVING (ADL)
ADLS_ACUITY_SCORE: 59

## 2025-06-25 NOTE — DISCHARGE INSTRUCTIONS
Follow Up       Follow up with Nephrology for dialysis as planned     Follow up with Vascular Surgery, Dr. Mcneil, in approximately four weeks. Please call to schedule this          Hospital Follow-up with Existing Primary Care Provider (PCP)       Follow up with INR within 5 days    Schedule Primary Care visit within: 3-5 Days (Urgent)   Recommended labs and Imaging (to be ordered by Primary Care Provider): INR and hgb; just needs a lab visit to be followed up by PCP/INR clinic no in person PCP needed next week

## 2025-06-25 NOTE — PROGRESS NOTES
Vascular Surgery Progress Note  06/25/2025       Subjective:  - Patient resting comfortably in bed this morning. Pain well controlled. No acute events overnight. HD MWF. Requesting his midodrine.      Objective:  Temp:  [97.5  F (36.4  C)-99  F (37.2  C)] 97.5  F (36.4  C)  Pulse:  [68-80] 77  Resp:  [10-22] 16  BP: ()/() 96/54  SpO2:  [92 %-100 %] 95 %       Physical Exam:  Gen: Awake, alert, NAD  Resp: Breathing room air, nonlabored   Left upper extremity fistula site in dressings       Labs:  Recent Labs   Lab 06/25/25  0713 06/24/25  0650 06/22/25 2004   WBC  --  9.2 4.1   HGB 7.3* 8.8* 9.4*   PLT  --  86* 77*       Recent Labs   Lab 06/25/25  0713 06/24/25  0650 06/23/25  0621 06/22/25 2004   * 135 133* 128*   POTASSIUM 4.3 4.9 4.6 4.8   CHLORIDE 91* 94* 96* 91*   CO2 24 23 21* 21*   BUN 46.4* 36.0* 66.2* 60.4*   CR 7.96* 6.73* 10.79* 10.37*   * 103* 98 106*   KELLY 8.5* 8.9 8.3* 8.6*   MAG  --   --   --  2.2   PHOS  --   --   --  5.5*        Assessment/Plan:   60 year old male POD1 s/p LUE AV fistula revision with thrombectomy and repair of aneurysms.     - continue multimodal pain control  - HD today, take midodrine before run  - ok to discharge after dialysis today from vascular surgery perspective  - follow up appt scheduled     Discussed with vascular surgery staff, , who is in agreement with the above.  - - - - - - - - - - - - - - - - - -  Darline Casper PA-C  Vascular Surgery

## 2025-06-25 NOTE — PROGRESS NOTES
Potassium   Date Value Ref Range Status   06/25/2025 4.3 3.4 - 5.3 mmol/L Final   12/29/2022 3.4 3.4 - 5.3 mmol/L Final   04/20/2020 3.9 3.4 - 5.3 mmol/L Final     Potassium POCT   Date Value Ref Range Status   10/05/2023 3.6 3.4 - 5.3 mmol/L Final     Hemoglobin   Date Value Ref Range Status   06/25/2025 7.3 (L) 13.3 - 17.7 g/dL Final   04/20/2020 14.3 13.3 - 17.7 g/dL Final     Creatinine   Date Value Ref Range Status   06/25/2025 7.96 (H) 0.67 - 1.17 mg/dL Final   04/20/2020 1.06 0.66 - 1.25 mg/dL Final     Urea Nitrogen   Date Value Ref Range Status   06/25/2025 46.4 (H) 8.0 - 23.0 mg/dL Final   12/29/2022 46 (H) 7 - 30 mg/dL Final   04/20/2020 23 7 - 30 mg/dL Final     Sodium   Date Value Ref Range Status   06/25/2025 131 (L) 135 - 145 mmol/L Final   04/20/2020 139 133 - 144 mmol/L Final     INR   Date Value Ref Range Status   06/25/2025 1.27 (H) 0.85 - 1.15 Final   10/07/2019 1.20 (H) 0.86 - 1.14 Final       DIALYSIS PROCEDURE NOTE  Hepatitis status of previous patient on machine log was checked and verified ok to use with this patients hepatitis status.  Patient dialyzed for 3.5 hrs. on a K2 bath with a net fluid removal of  2L.  A BFR of 400 ml/min was obtained via RIJ Catheter. The treatment plan was discussed with Dr. Moulton during the treatment.    Total heparin received during the treatment: 0 units.   Line flushed, clamped and capped with heparin 1:1000 1.9 mL (1900 units) per lumen    Meds  given: 88203 units of Epogen and  50grams of Albumin 25%  Complications: none      Person educated: Patient. Knowledge base minimal. Barriers to learning: none. Educated on procedure via oral mode. Patient verbalized understanding. Pt prefers explanation education style.     ICEBOAT? Timeout performed pre-treatment  I: Patient was identified using 2 identifiers  C:  Consent Signed Yes  E: Equipment preventative maintenance is current and dialysis delivery system OK to use  B: Hepatitis B Surface Antigen: Negative;  Draw Date: 06/22/2025      Hepatitis B Surface Antibody: Immune; Draw Date: 06/22/2025    O: Dialysis orders present and complete prior to treatment  A: Vascular access verified and assessed prior to treatment  T: Treatment was performed at a clinically appropriate time  ?: Patient was allowed to ask questions and address concerns prior to treatment  See Adult Hemodialysis flowsheet in [facility] for further details and post assessment.  Machine water alarm in place and functioning. Transducer pods intact and checked every 15min.   Pt returned via bed.  Chlorine/Chloramine water system checked every 4 hours.  Outpatient Dialysis at [facility] on MWF    Patient repositioned every 2 hours during the treatment.  Post treatment report given to PETER Sim regarding 2L of fluid removed, last BP of 103/55, and patient pain rating of 8/10.

## 2025-06-25 NOTE — PROGRESS NOTES
Diagnosis: Thrombectomy upper extremity, repair of aneurysm, left upper arm   POD#: 1  Mental Status: A/O x4  Activity/dangle: SBA  Diet: Renal   Pain: PRN oxy and PRN Dilaudid given  Nixon/Voiding: Bathroom. HD pt  02/LDA: On 1 L O2. PIV on right AC, SL. Right jugular double lumen for HD  D/C Date: Pending   Other Info: HD = MWF. Pt requesting to take midodrine (PROAMATINE) 30mg this AM stat, but unavailable, Requested med from pharmacy.

## 2025-06-25 NOTE — PROGRESS NOTES
VASCULAR SURGERY     Doing very well this morning.  Minimal left arm pain d    Dressings intact and dry to left upper arm aVF  Excellent pulse and thrill in axillary area      Impression: Doing very well following thrombectomy and aneurysm revision left upper arm access.  Is healed for approximately 4 weeks before being able to be used.  Patient will use right jugular tunneled catheter until that time.     Lower blood pressure for which he uses midodrine daily between 10 to 40 mg and sometimes twice daily on dialysis days.    Vascular standpoint patient may be discharged following dialysis this morning.  Follow-up with me in approximately 4 weeks.      Deejay Mcneil MD

## 2025-06-25 NOTE — DISCHARGE SUMMARY
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    Weight as of this encounter: 93.5 kg (206 lb 3.2 oz).       Follow-ups Needed After Discharge   Follow-up Appointments       Follow Up      Follow up with Nephrology for dialysis as planned    Follow up with Vascular Surgery, Dr. Mcneil, in approximately four weeks. 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  No thrill noted on exam upon admission but distal pulse in LUE is noted.  Some shortness of breath with activity and has had mild dull chest discomfort, but trop is flat, and EKG without overt acute ischemic changes. VSS in ED.   CXR  on admission fairly bland (see below).   Stable on RA. K WNL at 4.8.   *underwent fistulogram with IR 6/23 with large thrombus noted and ultimately inability to keep fistula open  *tunnel cath placed via IR 6/23  *underwent thrombectomy with repair of fistula aneurysms with Dr Mcneil 6/24 with EBL 25ml  - nephrology following, plan for dialysis 6/27 per typical schedule   --vascular surgery following, appreciate assistance  - continue work up with [place] for kidney transplant list  - resume warfarin at discharge, should have INR in the next few days   --notably has hx hypotension on dialysis days, continue PTA midodrine scheduled on dialysis days  --will increase frequency of PTA oxycodone for a few days      Hx of liver transplant 2016 - continue PTA tacrolimus      Acute on chronic anemia  Chronic thrombocytopenia   Recent baseline hgb has been around 9-10. Hgb on admission 9.4 now down to 7.3  He did develop hematoma left arm post fistulogram, no other signs of bleeding. Likely some loss from multiple procedurs   Plt 86, stable around recent baseline.   Received EPO with dialysis 6/25  --follow hgb outpatient   --return precautions discussed     CHRISTIAN on CPAP   GERD - PTA PPI   RLS - PTA ropinirole   Seizures - continue PTA vimpat   Hx of atrial fib with RVR (reportedly one brief episode when hospitalized previously) - has been on warfarin but seems to stop it on his own periodically; he reports [facility] transplant team has considered discontinuing his AC. Would recommend continuing regimen and follow up with outpatient team for ongoing mgmt / consideration of discontinuing etc.     Consultations This Hospital Stay   NEPHROLOGY IP CONSULT  INTERVENTIONAL RADIOLOGY ADULT/PEDS IP CONSULT  PHARMACY TO DOSE WARFARIN  CARE MANAGEMENT / SOCIAL WORK IP CONSULT    Code Status   Full Code    Time Spent on this Encounter   ILindsay PA-C, personally saw the patient today and spent greater than 30 minutes discharging  this patient.       Lindsay Archibald PA-C  [facility]  [address] [facility] [address]  Phone: [phone]  Fax: [phone]  ______________________________________________________________________    Physical Exam   Vital Signs: Temp: 98.2  F (36.8  C) Temp src: Oral BP: 90/60 Pulse: 80   Resp: 16 SpO2: 94 % O2 Device: None (Room air) Oxygen Delivery: 2 LPM  Weight: 206 lbs 3.2 oz    Constitutional: Alert and oriented, laying down in bed. Appears comfortable and is appropriately conversant   ENT: moist mucous membranes  Respiratory: Lungs clear to auscultation bilaterally. No increased WOB   Cardiovascular: Regular rate and rhythm, no significant LE edema   GI: active bowel sounds, abdomen soft, distension improved.  Non tender   MSK: moves all four extremities.   Neuro: speech is clear. Face symmetric. Follows commands   Extremities: LUE with no palpable thrill, increased overall edema LUE        Primary Care Physician   Ki Carter    Discharge Orders      Hemoglobin    RESULTS TO PCP; ORDERING PROVIDER WILL NOT BE ON SERVICE TO REVIEW RESULT     Reason for your hospital stay    You were here for management of clotted fistula     Activity    Your activity upon discharge: activity as tolerated     Tubes and Drains    Current Tubes and Drains:     Other  Duration           Procedure Access #1 06/23/25 Left Brachial Venous 1 day        CVC Line  Duration           CVC Double Lumen Right External jugular Tunneled 539 days    CVC Double Lumen Right Internal jugular Tunneled 1 day              When to contact your care team    You should be re evaluated if your are having dizziness/lower BPs than normal at home or any bleeding.  If swelling of your arm is significantly worsening you should be re evaluated     Follow Up    Follow up with Nephrology for dialysis as planned    Follow up with Vascular Surgery, Dr. Mcneil, in approximately four weeks. Please call to  schedule this     Discharge Instructions    Keep arm elevated    Please call to schedule an INR later this week or Monday     Diet    Follow this diet upon discharge: Current Diet:Orders Placed This Encounter      Renal Diet (dialysis)     Hospital Follow-up with Existing Primary Care Provider (PCP)    Follow up with INR within 5 days       Significant Results and Procedures   Most Recent 3 CBC's:  Recent Labs   Lab Test 06/25/25  0713 06/24/25  0650 06/22/25 2004 03/18/25  0939   WBC  --  9.2 4.1 4.1   HGB 7.3* 8.8* 9.4* 12.9*   * 104* 99 107*   PLT  --  86* 77* 91*     Most Recent 3 BMP's:  Recent Labs   Lab Test 06/25/25  0713 06/24/25  0650 06/23/25  0621   * 135 133*   POTASSIUM 4.3 4.9 4.6   CHLORIDE 91* 94* 96*   CO2 24 23 21*   BUN 46.4* 36.0* 66.2*   CR 7.96* 6.73* 10.79*   ANIONGAP 16* 18* 16*   KELLY 8.5* 8.9 8.3*   * 103* 98     Most Recent 3 INR's:  Recent Labs   Lab Test 06/25/25  0713 06/24/25  0650 06/23/25  1047   INR 1.27* 1.49* 1.31*     Most Recent 3 Hemoglobins:  Recent Labs   Lab Test 06/25/25  0713 06/24/25  0650 06/22/25 2004   HGB 7.3* 8.8* 9.4*   ,   Results for orders placed or performed during the hospital encounter of 06/22/25   Chest XR,  PA & LAT    Narrative    EXAM: XR CHEST 2 VIEWS  LOCATION: [facility]  DATE: 6/22/2025    INDICATION: missed dialysis Friday, short of breath  COMPARISON: None.      Impression    IMPRESSION: Heart size upper limits of normal. Normal pulmonary vascularity. There is some focal atelectasis in the right lung base. Probable small right effusion. Left lung clear.   IR Dialysis Fistulogram Left    Narrative    [facility]  LOCATION: [facility]  DATE: 6/23/2025    PROCEDURE:   1. Left upper extremity fistula declot  2. Tunneled dialysis catheter placement    INTERVENTIONAL RADIOLOGIST: Héctor Mckinnon MD.    INDICATION: 60-year-old man with renal failure, left arm brachiocephalic  fistula. Fistula is thrombosed, presents for declot, possible tunnel catheter placement..    CONSENT: The risks, benefits and alternatives of dialysis arteriovenous shunt evaluation with possible angioplasty, stent placement, thrombolysis and/or tunneled dialysis catheter placement were discussed with the patient in detail. All questions were   answered. Informed consent was given to proceed with the procedure.    MODERATE SEDATION: Versed 2 mg IV; Fentanyl 125 mcg IV.  During the timeout, immediately prior to the administration of medications, the patient was reassessed for adequacy to receive conscious sedation. Under physician supervision, Versed and fentanyl   were administered for moderate sedation. Pulse oximetry, heart rate and blood pressure were continuously monitored by an independent trained observer. The physician spent 97 minutes of face-to-face sedation time with the patient.    CONTRAST: 60 mL intravenous  ANTIBIOTICS: None.  ADDITIONAL MEDICATIONS: Heparin 6000 U IV  estimated blood loss: 200 mL    FLUOROSCOPIC TIME: 16.5 minutes.  RADIATION DOSE: Air Kerma: 26.7 mGy.    COMPLICATIONS: No immediate complications.    STERILE BARRIER TECHNIQUE: Maximum sterile barrier technique was used. Cutaneous antisepsis was performed at the operative site with application of 2% chlorhexidine and large sterile drape. Prior to the procedure, the  and assistant performed   hand hygiene and wore hat, mask, sterile gown, and sterile gloves during the entire procedure.    PROCEDURE: Prior to the procedure the dialysis access was evaluated with ultrasound. Images saved to the permanent patient medical record.    The skin overlying the arterial limb of the fistula was accessed under ultrasound guidance in an antegrade direction. Access was secured using a 6 Bruneian vascular sheath. A catheter and guidewire were navigated through the occluded fistula into left   subclavian vein. Venogram was performed to evaluate  the central veins. Pullback venogram was then performed to evaluate the distal extent of the fistula clot. A 5 Nauruan, 20 cm [employer]. infusion length catheter was then advanced over the guidewire.   Through the infusion catheter, 6 mg of TPA was pulse sprayed into the thrombosed aneurysm. Stiff guidewire was then advanced into the IVC. The thrombus within the occluded fistula was macerated with an 8 mm balloon. Repeat fistula gram was performed   which showed persistent occlusion of the fistula.    Decision was made to perform thrombectomy through debulk the thrombus within the aneurysmal fistula. The 6 Nauruan sheath was exchanged for a short 8 Nauruan sheath. The 8 Nauruan AngioJet device was then advanced over a guidewire into the thrombosed   fistula. Multiple passes of thrombectomy was performed throughout the thrombosed fistula with removal of a large volume of thrombus. The fistula was angioplastied up to 12 mm. Repeat fistulogram was performed.    At this point, although a large amount of thrombus was removed flow through the fistula remains poor with residual thrombus within the aneurysmal fistula. Discussed with Dr. Mcneil in vascular surgery, decision made to place a tunneled dialysis catheter.   He will consider surgical revision of the fistula. The sheath was then removed. Pursestring suture was placed around the access site using absorbable suture. Patient developed a subcutaneous hematoma from the fistula access after the sheath was removed   which extended in the proximal forearm. Hemostasis was achieved with prolonged manual pressure. The forearm hematoma remains stable in size during dialysis catheter placement. He had some pain at the hematoma site but no symptoms in his hand.    Attention was turned to tunneled dialysis catheter placement after failed declot. Limited right neck ultrasound demonstrated a patent internal jugular vein; images saved of the permanent patient medical record. The  overlying skin and subcutaneous soft   tissues were anesthetized using 1% lidocaine.    Under ultrasound guidance, the right internal jugular vein was accessed using a micropuncture needle. Access was secured using a 4 Israeli transitional sheath. A guidewire was advanced centrally.    Our attention was turned to the chest wall. The overlying skin and subcutaneous soft tissues were anesthetized using 1% lidocaine with epinephrine. A 3 mm transverse incision was made. The catheter tubing was tunneled in an antegrade fashion from the   chest wall incision to the neck dermatotomy site.     Using this access, a 23 cm tip to cuff palindrome dialysis catheter was advanced under fluoroscopic guidance until the tip was in the proximal right atrium. The catheter was tested and found to flush and aspirate appropriately. The catheter was   heparinized and secured to the skin. The neck dermatotomy was closed with absorbable suture and skin glue. A purse string suture was placed at the catheter exit site using absorbable suture.       FINDINGS:  Ultrasound evaluation of the left upper extremity fistula demonstrates thrombosed, aneurysmal left upper extremity fistula. The juxta anastomotic fistula was patent. The juxta anastomotic fistula was accessed.    Intial left upper extremity fistulogram demonstrates the juxta anastomotic fistula and arterial anastomosis are patent. The cannulating segment of the fistula is aneurysmal and thrombosed. The fistula venous outflow is thrombosed to the level of the   upper arm. The left upper extremity central veins are widely patent.    Pharmacologic and mechanical thrombectomy of the left upper extremity fistula and aspiration thrombectomy with the apex Israeli AngioJet device resulted in deep bulking of the thrombus within the fistula and restoration of some flow through the fistula.   However at the conclusion of the procedure there is residual thrombus within the aneurysmal fistula and flow  is likely inadequate for long-term patency.    A subcutaneous hematoma developed around the left elbow/proximal forearm following sheath removal. This was stable following prolonged manual pressure. Bruising and pain at this site over the next few days as expected..       23 cm cuff to tip tunneled dialysis catheter placed via the right internal jugular vein with the tip in the right atrium.      Impression    IMPRESSION:      1. Thrombosed, aneurysmal left upper extremity brachiocephalic fistula.  2. Unsuccessful left upper extremity fistula declot. The thrombus within the fistula was debulked and some flow was restored, however unable to restore sufficient flow for long-term patency.  3. Right chest tunneled dialysis catheter placed with the tip in the right atrium.    PLAN:  1.  Patient to be recovered on the inpatient floor.   2.  Tunneled dialysis catheter is ready for immediate use.    IR CVC Tunnel Placement > 5 Yrs of Age    Narrative    [facility]  LOCATION: [facility]  DATE: 6/23/2025    PROCEDURE:   1. Left upper extremity fistula declot  2. Tunneled dialysis catheter placement    INTERVENTIONAL RADIOLOGIST: Héctor Mckinnon MD.    INDICATION: 60-year-old man with renal failure, left arm brachiocephalic fistula. Fistula is thrombosed, presents for declot, possible tunnel catheter placement..    CONSENT: The risks, benefits and alternatives of dialysis arteriovenous shunt evaluation with possible angioplasty, stent placement, thrombolysis and/or tunneled dialysis catheter placement were discussed with the patient in detail. All questions were   answered. Informed consent was given to proceed with the procedure.    MODERATE SEDATION: Versed 2 mg IV; Fentanyl 125 mcg IV.  During the timeout, immediately prior to the administration of medications, the patient was reassessed for adequacy to receive conscious sedation. Under physician supervision, Versed and fentanyl   were  administered for moderate sedation. Pulse oximetry, heart rate and blood pressure were continuously monitored by an independent trained observer. The physician spent 97 minutes of face-to-face sedation time with the patient.    CONTRAST: 60 mL intravenous  ANTIBIOTICS: None.  ADDITIONAL MEDICATIONS: Heparin 6000 U IV  estimated blood loss: 200 mL    FLUOROSCOPIC TIME: 16.5 minutes.  RADIATION DOSE: Air Kerma: 26.7 mGy.    COMPLICATIONS: No immediate complications.    STERILE BARRIER TECHNIQUE: Maximum sterile barrier technique was used. Cutaneous antisepsis was performed at the operative site with application of 2% chlorhexidine and large sterile drape. Prior to the procedure, the  and assistant performed   hand hygiene and wore hat, mask, sterile gown, and sterile gloves during the entire procedure.    PROCEDURE: Prior to the procedure the dialysis access was evaluated with ultrasound. Images saved to the permanent patient medical record.    The skin overlying the arterial limb of the fistula was accessed under ultrasound guidance in an antegrade direction. Access was secured using a 6 Singaporean vascular sheath. A catheter and guidewire were navigated through the occluded fistula into left   subclavian vein. Venogram was performed to evaluate the central veins. Pullback venogram was then performed to evaluate the distal extent of the fistula clot. A 5 Singaporean, 20 cm [facility] infusion length catheter was then advanced over the guidewire.   Through the infusion catheter, 6 mg of TPA was pulse sprayed into the thrombosed aneurysm. Stiff guidewire was then advanced into the IVC. The thrombus within the occluded fistula was macerated with an 8 mm balloon. Repeat fistula gram was performed   which showed persistent occlusion of the fistula.    Decision was made to perform thrombectomy through debulk the thrombus within the aneurysmal fistula. The 6 Singaporean sheath was exchanged for a short 8 Singaporean sheath. The 8  Turkish AngioJet device was then advanced over a guidewire into the thrombosed   fistula. Multiple passes of thrombectomy was performed throughout the thrombosed fistula with removal of a large volume of thrombus. The fistula was angioplastied up to 12 mm. Repeat fistulogram was performed.    At this point, although a large amount of thrombus was removed flow through the fistula remains poor with residual thrombus within the aneurysmal fistula. Discussed with Dr. Mcneil in vascular surgery, decision made to place a tunneled dialysis catheter.   He will consider surgical revision of the fistula. The sheath was then removed. Pursestring suture was placed around the access site using absorbable suture. Patient developed a subcutaneous hematoma from the fistula access after the sheath was removed   which extended in the proximal forearm. Hemostasis was achieved with prolonged manual pressure. The forearm hematoma remains stable in size during dialysis catheter placement. He had some pain at the hematoma site but no symptoms in his hand.    Attention was turned to tunneled dialysis catheter placement after failed declot. Limited right neck ultrasound demonstrated a patent internal jugular vein; images saved of the permanent patient medical record. The overlying skin and subcutaneous soft   tissues were anesthetized using 1% lidocaine.    Under ultrasound guidance, the right internal jugular vein was accessed using a micropuncture needle. Access was secured using a 4 Turkish transitional sheath. A guidewire was advanced centrally.    Our attention was turned to the chest wall. The overlying skin and subcutaneous soft tissues were anesthetized using 1% lidocaine with epinephrine. A 3 mm transverse incision was made. The catheter tubing was tunneled in an antegrade fashion from the   chest wall incision to the neck dermatotomy site.     Using this access, a 23 cm tip to cuff palindrome dialysis catheter was advanced under  fluoroscopic guidance until the tip was in the proximal right atrium. The catheter was tested and found to flush and aspirate appropriately. The catheter was   heparinized and secured to the skin. The neck dermatotomy was closed with absorbable suture and skin glue. A purse string suture was placed at the catheter exit site using absorbable suture.       FINDINGS:  Ultrasound evaluation of the left upper extremity fistula demonstrates thrombosed, aneurysmal left upper extremity fistula. The juxta anastomotic fistula was patent. The juxta anastomotic fistula was accessed.    Intial left upper extremity fistulogram demonstrates the juxta anastomotic fistula and arterial anastomosis are patent. The cannulating segment of the fistula is aneurysmal and thrombosed. The fistula venous outflow is thrombosed to the level of the   upper arm. The left upper extremity central veins are widely patent.    Pharmacologic and mechanical thrombectomy of the left upper extremity fistula and aspiration thrombectomy with the apex [place]ib[place]ian AngioJet device resulted in deep bulking of the thrombus within the fistula and restoration of some flow through the fistula.   However at the conclusion of the procedure there is residual thrombus within the aneurysmal fistula and flow is likely inadequate for long-term patency.    A subcutaneous hematoma developed around the left elbow/proximal forearm following sheath removal. This was stable following prolonged manual pressure. Bruising and pain at this site over the next few days as expected..       23 cm cuff to tip tunneled dialysis catheter placed via the right internal jugular vein with the tip in the right atrium.      Impression    IMPRESSION:      1. Thrombosed, aneurysmal left upper extremity brachiocephalic fistula.  2. Unsuccessful left upper extremity fistula declot. The thrombus within the fistula was debulked and some flow was restored, however unable to restore sufficient flow for  long-term patency.  3. Right chest tunneled dialysis catheter placed with the tip in the right atrium.    PLAN:  1.  Patient to be recovered on the inpatient floor.   2.  Tunneled dialysis catheter is ready for immediate use.      *Note: Due to a large number of results and/or encounters for the requested time period, some results have not been displayed. A complete set of results can be found in Results Review.       Discharge Medications      Review of your medicines        CHANGE how you take these medications        Dose / Directions   oxyCODONE 5 MG tablet  Commonly known as: ROXICODONE  This may have changed: reasons to take this  Used for: Dialysis AV fistula malfunction, sequela, Acute post-operative pain      Dose: 5 mg  Take 1 tablet (5 mg) by mouth every 6 hours as needed for moderate pain.  Refills: 0     sevelamer carbonate 800 MG tablet  Commonly known as: RENVELA  This may have changed:   when to take this  reasons to take this  Used for: ESRD (end stage renal disease) on dialysis (H)      Dose: 800 mg  Take 1 tablet (800 mg) by mouth 4 times daily With meals and snacks  Quantity: 120 tablet  Refills: 0            CONTINUE these medicines which have NOT CHANGED        Dose / Directions   acetaminophen 325 MG tablet  Commonly known as: TYLENOL  Used for: Dialysis AV fistula malfunction, sequela      Dose: 650 mg  Take 2 tablets (650 mg) by mouth every 8 hours as needed for other (For optimal non-opioid multimodal pain management to improve pain control.)  Refills: 0     aspirin 81 MG EC tablet  Used for: Coronary artery disease involving native coronary artery of native heart without angina pectoris      Dose: 81 mg  Take 1 tablet (81 mg) by mouth daily  Quantity: 90 tablet  Refills: 3     gabapentin 100 MG capsule  Commonly known as: NEURONTIN  Used for: Itching      TAKE 3 CAPSULES(300 MG) BY MOUTH AT BEDTIME  Quantity: 90 capsule  Refills: 3     hydrOXYzine HCl 25 MG tablet  Commonly known as:  ATARAX  Used for: Restless leg syndrome, Insomnia, unspecified type, Liver transplanted (H)      Dose: 25 mg  Take 1 tablet (25 mg) by mouth 3 times daily as needed for itching.  Quantity: 90 tablet  Refills: 3     lacosamide 50 MG Tabs tablet  Commonly known as: VIMPAT  Used for: Focal epilepsy (H)      Dose: 50 mg  Take 1 tablet (50 mg) by mouth 2 times daily.  Quantity: 186 tablet  Refills: 3     melatonin 3 MG tablet  Used for: Insomnia, unspecified type      Dose: 3 mg  Take 1 tablet (3 mg) by mouth At Bedtime  Quantity: 90 tablet  Refills: 1     midodrine 10 MG tablet  Commonly known as: PROAMATINE  Used for: Hypertension, unspecified type      TAKE 1 TABLET 3X DAILY. ON DAILYSIS DAYS(M,W,F) TAKE 2 EXTRA TABLETS 1 HOUR BEFORE,1 TABLET WHEN YOU ARRIVE, AND 1 TABLET DURING DIALYSIS.  Quantity: 450 tablet  Refills: 1     multivitamin RENAL 1 capsule capsule  Used for: Restless leg syndrome, ESRD (end stage renal disease) on dialysis (H)      Dose: 1 capsule  TAKE 1 CAPSULE BY MOUTH DAILY  Quantity: 90 capsule  Refills: 3     omeprazole 40 MG DR capsule  Commonly known as: PriLOSEC  Used for: Gastroesophageal reflux disease with esophagitis without hemorrhage      Dose: 40 mg  TAKE 1 CAPSULE(40 MG) BY MOUTH DAILY  Quantity: 90 capsule  Refills: 1     rOPINIRole 0.5 MG tablet  Commonly known as: REQUIP  Used for: Insomnia, unspecified type, Restless leg syndrome      Dose: 0.5 mg  TAKE 1 TABLET(0.5 MG) BY MOUTH TWICE DAILY  Quantity: 180 tablet  Refills: 1     tacrolimus 1 MG capsule  Commonly known as: GENERIC EQUIVALENT  Used for: Liver transplanted (H)      Dose: 1 mg  TAKE 1 CAPSULE BY MOUTH EVERY 12 HOURS  Quantity: 180 capsule  Refills: 3     traZODone 50 MG tablet  Commonly known as: DESYREL  Used for: Insomnia, unspecified type      Dose: 50 mg  Take 1 tablet (50 mg) by mouth nightly as needed for sleep.  Quantity: 90 tablet  Refills: 3     warfarin ANTICOAGULANT 2 MG tablet  Commonly known as:  COUMADIN/JANTOVEN  Used for: Cerebrovascular accident (CVA) due to embolism of precerebral artery (H), Pleural effusion, Organ transplant candidate, Anticoagulated      Take as directed. If you are unsure how to take this medication, talk to your nurse or doctor.  Original instructions: 8 mg Mon, Fri; 6 mg all other days or as directed by INR clinic  Quantity: 300 tablet  Refills: 1            Allergies   No Known Allergies

## 2025-06-25 NOTE — PROGRESS NOTES
[facility]    Medicine Progress Note - Hospitalist Service    Date of Admission:  6/22/2025    Assessment & Plan   Blanco Osborne is a 60 year old male with history of ESRD on dialysis MWF, s/p liver transplant, hypertension, hyperlipidemia, GERD, CHRISTIAN on CPAP, RLS, and seizures among others who presented to the ED with complaints of mild shortness of breath.  Patient missed his last planned dialysis run this past Friday due to his fistula not working and noted concern of clot.  Thrill is not palpable in ED.  He has noted some dull chest discomfort and has felt winded with activity in recent days.  He denies any new edema in his legs but does feel some fullness in his abdomen.  He follows with Dr. Bradshaw-he was unable to get IR scheduled for fistulogram on Friday / over the weekend.     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  No thrill noted on exam upon admission but distal pulse in LUE is noted.  Some shortness of breath with activity and has had mild dull chest discomfort, but trop is flat, and EKG without overt acute ischemic changes. VSS in ED.   CXR on admission fairly bland (see below).   Stable on RA. K WNL at 4.8.   *underwent fistulogram with IR 6/23 with large thrombus noted and ultimately inability to keep fistula open  *tunnel cath placed via IR 6/23  *underwent thrombectomy with repair of fistula aneurysms with Dr Mcneil 6/24 with EBL 25ml  - nephrology following, plan for dialysis 6/25  --vascular surgery following, appreciate assistance  - continue work up with [place] for kidney transplant list  - resume warfarin pending surgical plan  --notably has hx hypotension on dialysis days, continue PTA midodrine scheduled on dialysis days, given intermittent  low pressures will add prn dose  -- prn oxy q4 hours prn    Hypoxia   Oxygen saturations down to 87% on RA after dialysis. He denies any SOB, cough, chest pain.   ***    Hx of liver transplant 2016 - continue PTA tacrolimus     Acute on chronic anemia  Chronic thrombocytopenia   Recent baseline hgb has been around 9-10. Hgb on admission 9.4 now down to 7.3  He did develop hematoma left arm post fistulogram, no other signs of bleeding  Plt 86, stable around recent baseline.   Received EPO with dialysis 6/25  --follow hgb in am   -- will try and avoid transfusion given workup for renal transplant     CHRISTIAN on CPAP   GERD - PTA PPI   RLS - PTA ropinirole   Seizures - continue PTA vimpat   Hx of atrial fib with RVR (reportedly one brief episode when hospitalized previously) - has been on warfarin but seems to stop it on his own periodically; he reports [facility] transplant team has considered discontinuing his AC. Would recommend continuing regimen and follow up with outpatient team for ongoing mgmt / consideration of discontinuing etc.     Diet:  npo   DVT Prophylaxis: Pneumatic Compression Devices, will resume warfarin post procedure pending findings   Nixon Catheter: Not present  Lines: PRESENT             Cardiac Monitoring: None  Code Status:  Full Code        Diet: Snacks/Supplements Adult: Gelatein 20 (sugar-free); With Meals  Renal Diet (dialysis)    DVT Prophylaxis: Pneumatic Compression Devices, will resume warfarin if hgb stable in am   Nixon Catheter: Not present  Lines: PRESENT    { TIP  Link to Avatar to review     :[id number]}  CVC Double Lumen Right Internal jugular Tunneled-Site Assessment: WDL      Cardiac Monitoring: None  Code Status: Full Code      Clinically Significant Risk Factors Present on Admission   { TIP  This section helps capture the illness of the patient on admission.     - Review diagnoses highlighted in blue; right click, edit & delete if not appropriate   - If blank, no additional diagnoses  identified   :[id number]}       # Hypochloremia: Lowest Cl = 94 mmol/L in last 2 days, will monitor as appropriate       # Drug Induced Coagulation Defect: home medication list includes an anticoagulant medication  # Drug Induced Platelet Defect: home medication list includes an antiplatelet medication   # Hypertension: Noted on problem list      # Anemia: based on hgb <11       # Overweight: Estimated body mass index is 27.97 kg/m  as calculated from the following:    Height as of this encounter: 1.829 m (6').    Weight as of this encounter: 93.5 kg (206 lb 3.2 oz).       # Financial/Environmental Concerns:           Social Drivers of Health    Alcohol Use: Unknown (10/7/2019)    AUDIT-C     Frequency of Alcohol Consumption: Monthly or less   Physical Activity: Insufficiently Active (4/18/2025)    Received from [facility]    Exercise Vital Sign     Days of Exercise per Week: 2 days     Minutes of Exercise per Session: 10 min          Disposition Plan   {TIP  It is advised to update the Medical Readiness for Discharge [MRD] daily, until the patient is 'Ready Now.' Last Documentation- Anticipated in 2-4 Days  . Use the SmartList below to update for today:[id number]}  Medically Ready for Discharge: Anticipated Tomorrow         The patient's care was discussed with Dr. Mary who agrees with the above plan     Lindsay Archibald PA-C  Hospitalist Service  [facility]  Securely message with [employer] (more info)  Text page via [facility] Paging/Directory   ______________________________________________________________________    Interval History   Feeling well this afternoon overall but continues to have significant pain LUE 8/10. Denies any respiratory symptoms. No SOB/cough. Denies CP, calf pain. No chills, fever. He is tolerating po and ambulating. His preference is to discharge home tonight if possible     Physical Exam   Vital Signs: Temp: 97.5  F (36.4  C) Temp src: Oral BP: 96/54 Pulse: 77   Resp: 16  SpO2: 95 % O2 Device: Nasal cannula Oxygen Delivery: 2 LPM  Weight: 206 lbs 3.2 oz    Constitutional: Alert and oriented, laying down in bed. Appears comfortable and is appropriately conversant   ENT: moist mucous membranes  Respiratory: Lungs clear to auscultation bilaterally. No increased WOB   Cardiovascular: Regular rate and rhythm, no significant LE edema   GI: active bowel sounds, abdomen soft, distension improved.  Non tender   MSK: moves all four extremities.   Neuro: speech is clear. Face symmetric. Follows commands   Extremities: LUE with no palpable thrill, increased overall edema LUE     Medical Decision Making   { TIP   MDM Calculator    MDM grid (w/ times)    Coding Support Chat  Billing is now based on time OR medical decision making complexity. Medical decision making included in your A&P does NOT need to be re-documented here.    :[id number]}    50 MINUTES SPENT BY ME on the date of service doing chart review, history, exam, documentation & further activities per the note.      Data   {INSERT LABS/IMAGING (OPTIONAL)   :[id number]}

## 2025-06-25 NOTE — TELEPHONE ENCOUNTER
ANTICOAGULATION  MANAGEMENT: Discharge Review    Blanco Osborne chart reviewed for anticoagulation continuity of care    Hospital Admission on 6/22/25 - 6/25/25 for clot in dialysis fistula after missed dialysis; s/p thrombectomy and fistula aneurysm repair 6/24/25; LUE hematoma; transient hypoxemia with hemodialysis today.    Discharge disposition: Home    Results:    Recent labs: (last 7 days)     06/23/25  1047 06/24/25  0650 06/25/25  0713   INR 1.31* 1.49* 1.27*     Anticoagulation inpatient management:     held warfarin due to invasive procedure 6/24/25, then hematoma      Anticoagulation discharge instructions:     Warfarin dosing: home regimen continued   Bridging: No   INR goal change: No      Medication changes affecting anticoagulation: No    Additional factors affecting anticoagulation: No     PLAN     No adjustment to anticoagulation plan needed  Agree with dosing adjustment on discharge  Agree with discharge plan for follow up on 6/27/25 or 6/30/25  Recommend to check INR on above dates    Left a detailed message for Blanco, advised he follow discharge instructions to take 6 mg tonight and [facility] RN will try calling again in the a.m.     Anticoagulation Calendar updated    Mar Albarran RN  6/25/2025  Anticoagulation Clinic  [facility] for routing messages: vashti MCGREGOR  [facility] patient phone line: [phone]

## 2025-06-25 NOTE — PLAN OF CARE
Goal Outcome Evaluation:      Summary: POD#0 Thrombectomy upper extremity, repair of aneurysm, left upper arm    Date & Time: 6/24/25 1935-2330  Orientation: A&O x4   Activity Level: SBA   Fall Risk: Yes   Behavior & Aggression: Green   Pain Management: PRN oxycodone   ABNL VS/O2: VS on 1L NC, hypotensive    Tele: N/A   ABNL Lab/BG:Cr 6.73, Hgb 8.8   Diet: Renal    Bowel/Bladder: Continent, HD pt    Skin: L arm incision, scattered bruises, L arm edema     Drains/Devices: R PIV SL,  R CVC  Tests/Procedures: Surgery today   Anticipated DC Date: Pending   Other Important Info: HD MWF  - Pt BP is giving different readings depending on  R upper arm, r lower arm or R leg. Upper arm giving lower readings. On call updated Bolus ordered but PT said he can not have IV fluid and fluid was stopped. BP 89/46 HR 74. Pt said he is fine. On call updated. No new order.

## 2025-06-25 NOTE — ANESTHESIA POSTPROCEDURE EVALUATION
Patient: Blanco Osborne    Procedure: Procedure(s):  Thrombectomy upper extremity, repair of aneurysm, left upper arm       Anesthesia Type:  General    Note:  Disposition: Inpatient   Postop Pain Control: Uneventful            Sign Out: Well controlled pain   PONV:    Neuro/Psych: Uneventful            Sign Out: Acceptable/Baseline neuro status   Airway/Respiratory: Uneventful            Sign Out: Acceptable/Baseline resp. status   CV/Hemodynamics: Uneventful            Sign Out: Acceptable CV status   Other NRE: NONE   DID A NON-ROUTINE EVENT OCCUR? No           Last vitals:  Vitals Value Taken Time   BP 95/50 06/24/25 19:00   Temp 36.9  C (98.42  F) 06/24/25 19:03   Pulse 66 06/24/25 19:04   Resp 8 06/24/25 19:04   SpO2 100 % 06/24/25 19:04   Vitals shown include unfiled device data.    Electronically Signed By: Jem Anderson MD  June 24, 2025  7:05 PM

## 2025-06-25 NOTE — PROGRESS NOTES
"MD Notification    Notified Person: MD    Notified Person Name: Kb Moulton MD    Notification Date/Time: 6/25/2025 @ 0649    Notification Interaction: [email or web address] messaging     Purpose of Notification: 2215, KA. Pt requesting to take his midodrine (PROAMATINE) Stat. Pt also wants this medication to be PRN.     Orders Received: Per MD: \"You can give it to him now\"      "

## 2025-06-25 NOTE — PROGRESS NOTES
MD Notification    Notified Person: MD    Notified Person Name: SONIA GRANT MD     Notification Date/Time: 6/25/2025 @ 0553    Notification Interaction: Amc     Purpose of Notification: 2215, KA. FYI: Pt has blood pressure of 85/49 with MAP of 61; asymptomatic.      Orders Received: Awaiting orders

## 2025-06-25 NOTE — PROGRESS NOTES
Inpatient Dialysis Progress Note            Assessment and Plan:     Blanco Osborne is a 60 year old male who was admitted on 6/22/2025.      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  He normally runs at [facility] on a MWF basis.  L avf.  3.5 H.  16 ga needles. .  TW 89.5 kfg.     2) Clotted AVF.  Last US 10/10/2024 showed focal stenosis in cephalic vein/brachial vein anastomosis. Declotted  and aneurysm repair  by Dr. Mcneil.       3) Anemia: HGB 9.4 to 7.3. Presume some losses  with procedures.   He is on MIrcera 30 mcg Q2W as outpt.  I gave him EPO 10K units today.     4) Hyponatremia:  Due to reduced water clearance in ESRD. This will  correct in HD     Plan:     HD today  EPO 10K units today  I think he could be discharged post HD  I have updated the [place]  [facility].  I told them not to use the AVF for 4 weeks.  Use only the tunneled catheter until then.          Interval History:     S/P revision L arm AVF.  Thrombectomy and aneurysm repair x 2.  He  was a bit  anxious prior  to getting  on HD.  As he is sleeping  now I did not awaken him.          Dialysis Parameters:     Wt Readings from Last 4 Encounters:   06/24/25 93.5 kg (206 lb 3.2 oz)   03/20/25 88.7 kg (195 lb 8 oz)   02/14/25 92.8 kg (204 lb 8 oz)   12/31/24 93.7 kg (206 lb 8 oz)     I/O last 3 completed shifts:  In: 918 [P.O.:618; I.V.:300]  Out: 25 [Blood:25]  BP Readings from Last 3 Encounters:   06/25/25 105/40   03/20/25 99/65   02/14/25 110/75       Routine, ONE TIME, Starting today For 1 Occurrences  Weight Loss (kg): 2  Dialysis Temp: 36.5  C  Access Device: CVC  Access Site: R IJ  Dialyzer: Revaclear  Dialysis Bath: K 3  Blood Flow Rate (mL/min): 400  Total Treatment Time (hrs): 3.5  Heparin: no         Medications and Allergies:   Reviewed in EPIC    Current Facility-Administered Medications   Medication Dose Route Frequency Provider Last Rate Last Admin    - MEDICATION INSTRUCTIONS for Dialysis Patients -   Does not apply  See Admin Instructions Arpit Fuentes MD        [Held by provider] aspirin EC tablet 81 mg  81 mg Oral Daily Lindsay Archibald PA-C        calcitRIOL (ROCALTROL) capsule 0.25 mcg  0.25 mcg Oral Once per day on Monday Wednesday Friday Kb Moulton MD   0.25 mcg at 06/23/25 1003    gabapentin (NEURONTIN) capsule 300 mg  300 mg Oral At Bedtime Lindsay Archibald PA-C   300 mg at 06/24/25 2159    lacosamide (VIMPAT) tablet 50 mg  50 mg Oral BID Lindsay Archibald PA-C   50 mg at 06/24/25 2323    midodrine (PROAMATINE) tablet 10 mg  10 mg Oral Once per day on Monday Wednesday Friday Kb Moulton MD   10 mg at 06/23/25 1407    midodrine (PROAMATINE) tablet 30 mg  30 mg Oral Once per day on Monday Wednesday Friday Kb Moulton MD   30 mg at 06/25/25 0724    multivitamin RENAL (TRIPHROCAPS) capsule 1 capsule  1 capsule Oral Daily Kb Moulton MD   1 capsule at 06/24/25 0756    No heparin via hemodialysis machine   Does not apply Once Kb Moulton MD        pantoprazole (PROTONIX) EC tablet 40 mg  40 mg Oral BID AC Lindsay Archibald PA-C   40 mg at 06/25/25 0640    rOPINIRole (REQUIP) tablet 0.5 mg  0.5 mg Oral BID Lindsay Archibald PA-C   0.5 mg at 06/24/25 2027    sodium chloride (PF) 0.9% PF flush 9 mL  9 mL Intracatheter During Dialysis/CRRT (from stock) Kb Moulton MD        sodium chloride (PF) 0.9% PF flush 9 mL  9 mL Intracatheter During Dialysis/CRRT (from stock) Kb Moulton MD        sodium chloride 0.9% BOLUS 200 mL  200 mL Hemodialysis Machine Once Kb Moulton MD        sodium chloride 0.9% BOLUS 250 mL  250 mL Intravenous Once in dialysis/CRRT Kb Moulton MD        tacrolimus (GENERIC EQUIVALENT) capsule 1 mg  1 mg Oral BID Lindsay Archibald PA-C   1 mg at 06/24/25 2200     Current Facility-Administered Medications   Medication Dose Route Frequency Provider Last Rate Last Admin    acetaminophen (TYLENOL) tablet 650 mg  650 mg Oral Q4H PRN  Arpit Fuentes MD   650 mg at 06/24/25 1242    Or    acetaminophen (TYLENOL) Suppository 650 mg  650 mg Rectal Q4H PRN Arpit Fuentes MD        alteplase (CATHFLO ACTIVASE) injection 2 mg  2 mg Intracatheter Q1H PRN bK Moulton MD        alteplase (CATHFLO ACTIVASE) injection 2 mg  2 mg Intracatheter Q1H PRN Kb Moulton MD        HYDROmorphone (PF) (DILAUDID) injection 0.3 mg  0.3 mg Intravenous Q4H PRN Marta Guido MD   0.3 mg at 06/25/25 0443    naloxone (NARCAN) injection 0.2 mg  0.2 mg Intravenous Q2 Min PRN Lindsay Archibald PA-C        Or    naloxone (NARCAN) injection 0.4 mg  0.4 mg Intravenous Q2 Min PRN Lindsay Archibald PA-C        Or    naloxone (NARCAN) injection 0.2 mg  0.2 mg Intramuscular Q2 Min PRN Lindsay Archibald PA-C        Or    naloxone (NARCAN) injection 0.4 mg  0.4 mg Intramuscular Q2 Min PRN Lindsay Archibald PA-C        ondansetron (ZOFRAN ODT) ODT tab 4 mg  4 mg Oral Q6H PRN Arpit Fuentes MD        Or    ondansetron (ZOFRAN) injection 4 mg  4 mg Intravenous Q6H PRN Arpit Fuentes MD   4 mg at 06/24/25 0058    oxyCODONE (ROXICODONE) tablet 5 mg  5 mg Oral Q4H PRN Lindsay Archibald PA-C   5 mg at 06/24/25 1242    oxyCODONE IR (ROXICODONE) half-tab 2.5 mg  2.5 mg Oral Q4H PRN Marta Guido MD        Or    oxyCODONE (ROXICODONE) tablet 5 mg  5 mg Oral Q4H PRN Marta Guido MD   5 mg at 06/25/25 0640    Patient is already receiving anticoagulation with heparin, enoxaparin (LOVENOX), warfarin (COUMADIN)  or other anticoagulant medication   Does not apply Continuous PRN Arpit Fuentes MD        prochlorperazine (COMPAZINE) injection 10 mg  10 mg Intravenous Q6H PRN Arpit Fuentes MD        Or    prochlorperazine (COMPAZINE) tablet 10 mg  10 mg Oral Q6H PRArpit Hopkins MD        senna-docusate (SENOKOT-S/PERICOLACE) 8.6-50 MG per tablet 1 tablet  1 tablet Oral BID Arpit White MD    1 tablet at 06/24/25 0757    Or    senna-docusate (SENOKOT-S/PERICOLACE) 8.6-50 MG per tablet 2 tablet  2 tablet Oral BID PRN Arpit Fuentes MD        sevelamer carbonate (RENVELA) tablet 800 mg  800 mg Oral 4x Daily PRN Lindsay Archibald PA-C        sodium chloride (PF) 0.9% PF flush 10 mL  10 mL Intracatheter Q15 Min PRN Kb Moulton MD        sodium chloride (PF) 0.9% PF flush 10 mL  10 mL Intracatheter Q15 Min PRN Kb Moulton MD        sodium chloride 0.9% BOLUS 100-150 mL  100-150 mL Intravenous Q15 Min PRN Kb Moulton MD        sodium chloride 0.9% BOLUS 100-150 mL  100-150 mL Intravenous Q15 Min PRN Kb Moulton MD        traZODone (DESYREL) tablet 50 mg  50 mg Oral At Bedtime PRN Lindsay Archibald PA-C          No Known Allergies           Labs:     BMP  Recent Labs   Lab 06/25/25  0713 06/24/25  0650 06/23/25  0621 06/22/25 2004   * 135 133* 128*   POTASSIUM 4.3 4.9 4.6 4.8   CHLORIDE 91* 94* 96* 91*   KELLY 8.5* 8.9 8.3* 8.6*   CO2 24 23 21* 21*   BUN 46.4* 36.0* 66.2* 60.4*   CR 7.96* 6.73* 10.79* 10.37*   * 103* 98 106*     CBC  Recent Labs   Lab 06/25/25  0713 06/24/25  0650 06/22/25 2004   WBC  --  9.2 4.1   HGB 7.3* 8.8* 9.4*   HCT  --  26.2* 27.6*   * 104* 99   PLT  --  86* 77*     Lab Results   Component Value Date    AST 21 03/18/2025    ALT 15 03/18/2025    ALKPHOS 214 (H) 03/18/2025    BILITOTAL 0.7 03/18/2025    CHANDLER 37 10/30/2023            Physical Exam:   Vitals were reviewed in [facility]    Wt Readings from Last 3 Encounters:   06/24/25 93.5 kg (206 lb 3.2 oz)   03/20/25 88.7 kg (195 lb 8 oz)   02/14/25 92.8 kg (204 lb 8 oz)       Intake/Output Summary (Last 24 hours) at 6/25/2025 1203  Last data filed at 6/25/2025 0853  Gross per 24 hour   Intake 658 ml   Output 25 ml   Net 633 ml       GENERAL APPEARANCE: he is sleeping in HD  HEENT:  Eyes/ears/nose grossly normal, neck supple  EXTREMITIES/SKIN: L arm has  dressing applied.       Pt seen on  dialysis.  Stable run.  Good BFR.      Attestation:  I have reviewed today's vital signs, notes, medications, labs and imaging.     Kb Moulton MD  [facility] - Nephrology  [phone]

## 2025-06-26 ENCOUNTER — TELEPHONE (OUTPATIENT)
Dept: ANTICOAGULATION | Facility: CLINIC | Age: 60
End: 2025-06-26

## 2025-06-26 ENCOUNTER — APPOINTMENT (OUTPATIENT)
Dept: CT IMAGING | Facility: CLINIC | Age: 60
DRG: 314 | End: 2025-06-26
Attending: STUDENT IN AN ORGANIZED HEALTH CARE EDUCATION/TRAINING PROGRAM
Payer: COMMERCIAL

## 2025-06-26 ENCOUNTER — HOSPITAL ENCOUNTER (INPATIENT)
Facility: CLINIC | Age: 60
DRG: 314 | End: 2025-06-26
Attending: STUDENT IN AN ORGANIZED HEALTH CARE EDUCATION/TRAINING PROGRAM | Admitting: STUDENT IN AN ORGANIZED HEALTH CARE EDUCATION/TRAINING PROGRAM
Payer: COMMERCIAL

## 2025-06-26 ENCOUNTER — APPOINTMENT (OUTPATIENT)
Dept: GENERAL RADIOLOGY | Facility: CLINIC | Age: 60
DRG: 314 | End: 2025-06-26
Attending: STUDENT IN AN ORGANIZED HEALTH CARE EDUCATION/TRAINING PROGRAM
Payer: COMMERCIAL

## 2025-06-26 VITALS
DIASTOLIC BLOOD PRESSURE: 57 MMHG | RESPIRATION RATE: 34 BRPM | TEMPERATURE: 98 F | SYSTOLIC BLOOD PRESSURE: 89 MMHG | HEART RATE: 65 BPM | OXYGEN SATURATION: 97 %

## 2025-06-26 DIAGNOSIS — R55 NEAR SYNCOPE: ICD-10-CM

## 2025-06-26 DIAGNOSIS — R09.02 HYPOXIA: ICD-10-CM

## 2025-06-26 DIAGNOSIS — R53.1 WEAKNESS GENERALIZED: ICD-10-CM

## 2025-06-26 DIAGNOSIS — E87.70 HYPERVOLEMIA, UNSPECIFIED HYPERVOLEMIA TYPE: ICD-10-CM

## 2025-06-26 LAB
ALBUMIN SERPL BCG-MCNC: 4 G/DL (ref 3.5–5.2)
ALP SERPL-CCNC: 208 U/L (ref 40–150)
ALT SERPL W P-5'-P-CCNC: 54 U/L (ref 0–70)
AMMONIA PLAS-SCNC: 43 UMOL/L (ref 16–60)
ANION GAP SERPL CALCULATED.3IONS-SCNC: 15 MMOL/L (ref 7–15)
AST SERPL W P-5'-P-CCNC: 147 U/L (ref 0–45)
ATRIAL RATE - MUSE: 73 BPM
BASE EXCESS BLDV CALC-SCNC: 2 MMOL/L (ref -3–3)
BASOPHILS # BLD AUTO: 0 10E3/UL (ref 0–0.2)
BASOPHILS NFR BLD AUTO: 0 %
BILIRUB SERPL-MCNC: 1 MG/DL
BUN SERPL-MCNC: 25.4 MG/DL (ref 8–23)
CALCIUM SERPL-MCNC: 8.7 MG/DL (ref 8.8–10.4)
CHLORIDE SERPL-SCNC: 91 MMOL/L (ref 98–107)
CREAT SERPL-MCNC: 5.64 MG/DL (ref 0.67–1.17)
DIASTOLIC BLOOD PRESSURE - MUSE: NORMAL MMHG
EGFRCR SERPLBLD CKD-EPI 2021: 11 ML/MIN/1.73M2
EOSINOPHIL # BLD AUTO: 0 10E3/UL (ref 0–0.7)
EOSINOPHIL NFR BLD AUTO: 0 %
ERYTHROCYTE [DISTWIDTH] IN BLOOD BY AUTOMATED COUNT: 15.9 % (ref 10–15)
GLUCOSE SERPL-MCNC: 192 MG/DL (ref 70–99)
HCO3 BLDV-SCNC: 29 MMOL/L (ref 21–28)
HCO3 SERPL-SCNC: 26 MMOL/L (ref 22–29)
HCT VFR BLD AUTO: 24.6 % (ref 40–53)
HGB BLD-MCNC: 7.6 G/DL (ref 13.3–17.7)
IMM GRANULOCYTES # BLD: 0.2 10E3/UL
IMM GRANULOCYTES NFR BLD: 2 %
INR PPP: 1.47 (ref 0.85–1.15)
INR PPP: NORMAL
INTERPRETATION ECG - MUSE: NORMAL
LACTATE BLD-SCNC: 1.4 MMOL/L (ref 0.7–2)
LYMPHOCYTES # BLD AUTO: 0.9 10E3/UL (ref 0.8–5.3)
LYMPHOCYTES NFR BLD AUTO: 9 %
MCH RBC QN AUTO: 33.8 PG (ref 26.5–33)
MCHC RBC AUTO-ENTMCNC: 30.9 G/DL (ref 31.5–36.5)
MCV RBC AUTO: 109 FL (ref 78–100)
MONOCYTES # BLD AUTO: 1.3 10E3/UL (ref 0–1.3)
MONOCYTES NFR BLD AUTO: 14 %
NEUTROPHILS # BLD AUTO: 6.8 10E3/UL (ref 1.6–8.3)
NEUTROPHILS NFR BLD AUTO: 74 %
NRBC # BLD AUTO: 0.1 10E3/UL
NRBC BLD AUTO-RTO: 2 /100
NT-PROBNP SERPL-MCNC: ABNORMAL PG/ML (ref 0–177)
P AXIS - MUSE: 49 DEGREES
PCO2 BLDV: 59 MM HG (ref 40–50)
PH BLDV: 7.3 [PH] (ref 7.32–7.43)
PLATELET # BLD AUTO: 105 10E3/UL (ref 150–450)
PO2 BLDV: 36 MM HG (ref 25–47)
POTASSIUM SERPL-SCNC: 4.2 MMOL/L (ref 3.4–5.3)
PR INTERVAL - MUSE: 196 MS
PROT SERPL-MCNC: 7.1 G/DL (ref 6.4–8.3)
PROTHROMBIN TIME: 17.4 SECONDS (ref 11.8–14.8)
PROTHROMBIN TIME: NORMAL S
QRS DURATION - MUSE: 74 MS
QT - MUSE: 410 MS
QTC - MUSE: 451 MS
R AXIS - MUSE: 40 DEGREES
RBC # BLD AUTO: 2.25 10E6/UL (ref 4.4–5.9)
SAO2 % BLDV: 61 % (ref 70–75)
SODIUM SERPL-SCNC: 132 MMOL/L (ref 135–145)
SYSTOLIC BLOOD PRESSURE - MUSE: NORMAL MMHG
T AXIS - MUSE: 49 DEGREES
VENTRICULAR RATE- MUSE: 73 BPM
WBC # BLD AUTO: 9.2 10E3/UL (ref 4–11)

## 2025-06-26 PROCEDURE — 258N000003 HC RX IP 258 OP 636: Performed by: STUDENT IN AN ORGANIZED HEALTH CARE EDUCATION/TRAINING PROGRAM

## 2025-06-26 PROCEDURE — 85610 PROTHROMBIN TIME: CPT | Performed by: STUDENT IN AN ORGANIZED HEALTH CARE EDUCATION/TRAINING PROGRAM

## 2025-06-26 PROCEDURE — 82140 ASSAY OF AMMONIA: CPT | Performed by: STUDENT IN AN ORGANIZED HEALTH CARE EDUCATION/TRAINING PROGRAM

## 2025-06-26 PROCEDURE — 93005 ELECTROCARDIOGRAM TRACING: CPT

## 2025-06-26 PROCEDURE — 90935 HEMODIALYSIS ONE EVALUATION: CPT

## 2025-06-26 PROCEDURE — 250N000011 HC RX IP 250 OP 636: Performed by: INTERNAL MEDICINE

## 2025-06-26 PROCEDURE — 250N000009 HC RX 250: Performed by: STUDENT IN AN ORGANIZED HEALTH CARE EDUCATION/TRAINING PROGRAM

## 2025-06-26 PROCEDURE — 96360 HYDRATION IV INFUSION INIT: CPT

## 2025-06-26 PROCEDURE — 99285 EMERGENCY DEPT VISIT HI MDM: CPT | Mod: 25

## 2025-06-26 PROCEDURE — 84155 ASSAY OF PROTEIN SERUM: CPT | Performed by: STUDENT IN AN ORGANIZED HEALTH CARE EDUCATION/TRAINING PROGRAM

## 2025-06-26 PROCEDURE — 85025 COMPLETE CBC W/AUTO DIFF WBC: CPT | Performed by: STUDENT IN AN ORGANIZED HEALTH CARE EDUCATION/TRAINING PROGRAM

## 2025-06-26 PROCEDURE — 83880 ASSAY OF NATRIURETIC PEPTIDE: CPT | Performed by: STUDENT IN AN ORGANIZED HEALTH CARE EDUCATION/TRAINING PROGRAM

## 2025-06-26 PROCEDURE — 120N000001 HC R&B MED SURG/OB

## 2025-06-26 PROCEDURE — 250N000009 HC RX 250: Performed by: EMERGENCY MEDICINE

## 2025-06-26 PROCEDURE — 82803 BLOOD GASES ANY COMBINATION: CPT

## 2025-06-26 PROCEDURE — 36415 COLL VENOUS BLD VENIPUNCTURE: CPT | Performed by: STUDENT IN AN ORGANIZED HEALTH CARE EDUCATION/TRAINING PROGRAM

## 2025-06-26 PROCEDURE — 258N000003 HC RX IP 258 OP 636: Performed by: INTERNAL MEDICINE

## 2025-06-26 PROCEDURE — 3E033XZ INTRODUCTION OF VASOPRESSOR INTO PERIPHERAL VEIN, PERCUTANEOUS APPROACH: ICD-10-PCS | Performed by: EMERGENCY MEDICINE

## 2025-06-26 PROCEDURE — 250N000011 HC RX IP 250 OP 636: Performed by: STUDENT IN AN ORGANIZED HEALTH CARE EDUCATION/TRAINING PROGRAM

## 2025-06-26 PROCEDURE — 250N000013 HC RX MED GY IP 250 OP 250 PS 637: Performed by: INTERNAL MEDICINE

## 2025-06-26 PROCEDURE — 99223 1ST HOSP IP/OBS HIGH 75: CPT | Performed by: INTERNAL MEDICINE

## 2025-06-26 PROCEDURE — 99223 1ST HOSP IP/OBS HIGH 75: CPT | Performed by: STUDENT IN AN ORGANIZED HEALTH CARE EDUCATION/TRAINING PROGRAM

## 2025-06-26 PROCEDURE — P9045 ALBUMIN (HUMAN), 5%, 250 ML: HCPCS | Mod: JZ | Performed by: INTERNAL MEDICINE

## 2025-06-26 PROCEDURE — 250N000013 HC RX MED GY IP 250 OP 250 PS 637: Performed by: STUDENT IN AN ORGANIZED HEALTH CARE EDUCATION/TRAINING PROGRAM

## 2025-06-26 PROCEDURE — 71046 X-RAY EXAM CHEST 2 VIEWS: CPT

## 2025-06-26 PROCEDURE — 87040 BLOOD CULTURE FOR BACTERIA: CPT | Performed by: STUDENT IN AN ORGANIZED HEALTH CARE EDUCATION/TRAINING PROGRAM

## 2025-06-26 PROCEDURE — 74177 CT ABD & PELVIS W/CONTRAST: CPT

## 2025-06-26 PROCEDURE — 70450 CT HEAD/BRAIN W/O DYE: CPT

## 2025-06-26 RX ORDER — MIDODRINE HYDROCHLORIDE 10 MG/1
10 TABLET ORAL ONCE
Status: COMPLETED | OUTPATIENT
Start: 2025-06-26 | End: 2025-06-26

## 2025-06-26 RX ORDER — GABAPENTIN 300 MG/1
300 CAPSULE ORAL AT BEDTIME
Status: CANCELLED | OUTPATIENT
Start: 2025-06-26

## 2025-06-26 RX ORDER — MIDODRINE HYDROCHLORIDE 10 MG/1
10 TABLET ORAL
Status: DISCONTINUED | OUTPATIENT
Start: 2025-06-26 | End: 2025-06-27 | Stop reason: HOSPADM

## 2025-06-26 RX ORDER — TRAZODONE HYDROCHLORIDE 50 MG/1
50 TABLET ORAL
Status: CANCELLED | OUTPATIENT
Start: 2025-06-26

## 2025-06-26 RX ORDER — HEPARIN SODIUM 1000 [USP'U]/ML
500 INJECTION, SOLUTION INTRAVENOUS; SUBCUTANEOUS CONTINUOUS
Status: DISCONTINUED | OUTPATIENT
Start: 2025-06-26 | End: 2025-06-26

## 2025-06-26 RX ORDER — CALCIUM CARBONATE 500 MG/1
1 TABLET, CHEWABLE ORAL 4 TIMES DAILY PRN
Status: ON HOLD | COMMUNITY

## 2025-06-26 RX ORDER — POLYETHYLENE GLYCOL 3350 17 G/17G
17 POWDER, FOR SOLUTION ORAL 2 TIMES DAILY PRN
Status: CANCELLED | OUTPATIENT
Start: 2025-06-26

## 2025-06-26 RX ORDER — LIDOCAINE 40 MG/G
CREAM TOPICAL
Status: CANCELLED | OUTPATIENT
Start: 2025-06-26

## 2025-06-26 RX ORDER — GABAPENTIN 100 MG/1
300 CAPSULE ORAL
Status: ON HOLD | COMMUNITY

## 2025-06-26 RX ORDER — IOPAMIDOL 755 MG/ML
103 INJECTION, SOLUTION INTRAVASCULAR ONCE
Status: COMPLETED | OUTPATIENT
Start: 2025-06-26 | End: 2025-06-26

## 2025-06-26 RX ORDER — ROPIVACAINE IN 0.9% SOD CHL/PF 0.1 %
.01-.2 PLASTIC BAG, INJECTION (ML) EPIDURAL CONTINUOUS
Status: DISCONTINUED | OUTPATIENT
Start: 2025-06-26 | End: 2025-06-27 | Stop reason: HOSPADM

## 2025-06-26 RX ORDER — MIDODRINE HYDROCHLORIDE 10 MG/1
30 TABLET ORAL ONCE
Status: COMPLETED | OUTPATIENT
Start: 2025-06-26 | End: 2025-06-26

## 2025-06-26 RX ORDER — CALCIUM CARBONATE 500 MG/1
1000 TABLET, CHEWABLE ORAL 4 TIMES DAILY PRN
Status: CANCELLED | OUTPATIENT
Start: 2025-06-26

## 2025-06-26 RX ORDER — ROPINIROLE 0.5 MG/1
0.5 TABLET, FILM COATED ORAL 2 TIMES DAILY
Status: CANCELLED | OUTPATIENT
Start: 2025-06-26

## 2025-06-26 RX ORDER — OMEPRAZOLE 20 MG/1
40 CAPSULE, DELAYED RELEASE ORAL DAILY
Status: CANCELLED | OUTPATIENT
Start: 2025-06-26

## 2025-06-26 RX ORDER — TACROLIMUS 1 MG/1
1 CAPSULE ORAL EVERY 12 HOURS
Status: CANCELLED | OUTPATIENT
Start: 2025-06-26

## 2025-06-26 RX ORDER — MIDODRINE HYDROCHLORIDE 10 MG/1
10 TABLET ORAL
Status: DISCONTINUED | OUTPATIENT
Start: 2025-06-26 | End: 2025-06-26

## 2025-06-26 RX ORDER — OXYCODONE HYDROCHLORIDE 5 MG/1
5 TABLET ORAL EVERY 6 HOURS PRN
Status: DISCONTINUED | OUTPATIENT
Start: 2025-06-26 | End: 2025-06-27 | Stop reason: HOSPADM

## 2025-06-26 RX ORDER — ACETAMINOPHEN 650 MG/1
650 SUPPOSITORY RECTAL EVERY 4 HOURS PRN
Status: DISCONTINUED | OUTPATIENT
Start: 2025-06-26 | End: 2025-06-27 | Stop reason: HOSPADM

## 2025-06-26 RX ORDER — AMOXICILLIN 250 MG
2 CAPSULE ORAL 2 TIMES DAILY PRN
Status: CANCELLED | OUTPATIENT
Start: 2025-06-26

## 2025-06-26 RX ORDER — ASPIRIN 81 MG/1
81 TABLET ORAL DAILY
Status: CANCELLED | OUTPATIENT
Start: 2025-06-26

## 2025-06-26 RX ORDER — WARFARIN SODIUM 2 MG/1
TABLET ORAL
Status: ON HOLD | COMMUNITY

## 2025-06-26 RX ORDER — SEVELAMER CARBONATE 800 MG/1
800 TABLET, FILM COATED ORAL 4 TIMES DAILY
Status: CANCELLED | OUTPATIENT
Start: 2025-06-26

## 2025-06-26 RX ORDER — LACOSAMIDE 50 MG/1
50 TABLET ORAL EVERY OTHER DAY
Status: CANCELLED | OUTPATIENT
Start: 2025-06-26

## 2025-06-26 RX ORDER — ONDANSETRON 2 MG/ML
4 INJECTION INTRAMUSCULAR; INTRAVENOUS EVERY 6 HOURS PRN
Status: DISCONTINUED | OUTPATIENT
Start: 2025-06-26 | End: 2025-06-27 | Stop reason: HOSPADM

## 2025-06-26 RX ORDER — NOREPINEPHRINE BITARTRATE 0.02 MG/ML
.01-.6 INJECTION, SOLUTION INTRAVENOUS CONTINUOUS
Status: DISCONTINUED | OUTPATIENT
Start: 2025-06-26 | End: 2025-06-26

## 2025-06-26 RX ORDER — CEFTRIAXONE 2 G/1
2 INJECTION, POWDER, FOR SOLUTION INTRAMUSCULAR; INTRAVENOUS EVERY 24 HOURS
Status: DISCONTINUED | OUTPATIENT
Start: 2025-06-26 | End: 2025-06-27 | Stop reason: HOSPADM

## 2025-06-26 RX ORDER — FAMOTIDINE 20 MG/1
20 TABLET, FILM COATED ORAL 2 TIMES DAILY PRN
Status: ON HOLD | COMMUNITY

## 2025-06-26 RX ORDER — ACETAMINOPHEN 325 MG/1
650 TABLET ORAL EVERY 4 HOURS PRN
Status: DISCONTINUED | OUTPATIENT
Start: 2025-06-26 | End: 2025-06-27 | Stop reason: HOSPADM

## 2025-06-26 RX ORDER — MIDODRINE HYDROCHLORIDE 10 MG/1
30 TABLET ORAL
Status: DISCONTINUED | OUTPATIENT
Start: 2025-06-27 | End: 2025-06-27 | Stop reason: HOSPADM

## 2025-06-26 RX ORDER — HYDROXYZINE HYDROCHLORIDE 25 MG/1
25 TABLET, FILM COATED ORAL 3 TIMES DAILY PRN
Status: CANCELLED | OUTPATIENT
Start: 2025-06-26

## 2025-06-26 RX ORDER — ONDANSETRON 4 MG/1
4 TABLET, ORALLY DISINTEGRATING ORAL EVERY 6 HOURS PRN
Status: DISCONTINUED | OUTPATIENT
Start: 2025-06-26 | End: 2025-06-27 | Stop reason: HOSPADM

## 2025-06-26 RX ORDER — GABAPENTIN 300 MG/1
300 CAPSULE ORAL
Status: CANCELLED | OUTPATIENT
Start: 2025-06-26

## 2025-06-26 RX ORDER — AMOXICILLIN 250 MG
1 CAPSULE ORAL 2 TIMES DAILY PRN
Status: CANCELLED | OUTPATIENT
Start: 2025-06-26

## 2025-06-26 RX ORDER — MIDODRINE HYDROCHLORIDE 10 MG/1
10 TABLET ORAL
Status: DISCONTINUED | OUTPATIENT
Start: 2025-06-27 | End: 2025-06-26

## 2025-06-26 RX ADMIN — ONDANSETRON 4 MG: 2 INJECTION, SOLUTION INTRAMUSCULAR; INTRAVENOUS at 22:02

## 2025-06-26 RX ADMIN — SODIUM CHLORIDE 69 ML: 9 INJECTION, SOLUTION INTRAVENOUS at 20:59

## 2025-06-26 RX ADMIN — SODIUM CHLORIDE 500 ML: 0.9 INJECTION, SOLUTION INTRAVENOUS at 13:01

## 2025-06-26 RX ADMIN — SODIUM CHLORIDE 100 ML: 9 INJECTION, SOLUTION INTRAVENOUS at 18:33

## 2025-06-26 RX ADMIN — NOREPINEPHRINE BITARTRATE 0.03 MCG/KG/MIN: 0.02 INJECTION, SOLUTION INTRAVENOUS at 23:08

## 2025-06-26 RX ADMIN — SODIUM CHLORIDE 200 ML: 9 INJECTION, SOLUTION INTRAVENOUS at 18:45

## 2025-06-26 RX ADMIN — IOPAMIDOL 103 ML: 755 INJECTION, SOLUTION INTRAVENOUS at 20:59

## 2025-06-26 RX ADMIN — MIDODRINE HYDROCHLORIDE 10 MG: 10 TABLET ORAL at 17:40

## 2025-06-26 RX ADMIN — HEPARIN SODIUM 1900 UNITS: 1000 INJECTION, SOLUTION INTRAVENOUS; SUBCUTANEOUS at 17:25

## 2025-06-26 RX ADMIN — MIDODRINE HYDROCHLORIDE 30 MG: 10 TABLET ORAL at 16:22

## 2025-06-26 RX ADMIN — ALBUMIN HUMAN 250 ML: 0.05 INJECTION, SOLUTION INTRAVENOUS at 16:56

## 2025-06-26 RX ADMIN — MIDODRINE HYDROCHLORIDE 10 MG: 10 TABLET ORAL at 14:43

## 2025-06-26 RX ADMIN — SODIUM CHLORIDE 500 ML: 0.9 INJECTION, SOLUTION INTRAVENOUS at 17:24

## 2025-06-26 RX ADMIN — HEPARIN SODIUM 1900 UNITS: 1000 INJECTION, SOLUTION INTRAVENOUS; SUBCUTANEOUS at 17:27

## 2025-06-26 ASSESSMENT — ACTIVITIES OF DAILY LIVING (ADL)
ADLS_ACUITY_SCORE: 59

## 2025-06-26 ASSESSMENT — COLUMBIA-SUICIDE SEVERITY RATING SCALE - C-SSRS: IS THE PATIENT NOT ABLE TO COMPLETE C-SSRS: UNABLE TO VERBALIZE

## 2025-06-26 NOTE — ED TRIAGE NOTES
BIBA from home after family found him hypoxic this morning O2 sats in 60s on home monitor and Fire confirmed low O2 saturations placed on NC 4 L currently 95%. Family also reported AMS and weakness. For EMS was hypotensive systolic 80-90s given 500mL fluids, and 4mg zofran for emesis en route.Hx liver transplant and CKD, .   PRIMARY CARE CLINIC FOLLOW UP VISIT  Chief Complaint   Patient presents with   • Other     Due for B-12 today    • Hernia     Supraumbilical hernia     History of Present Illness     Abdominal hernia  Was in the ER recently for an abdominal bulge noted to be a hernia. He has been feeling nausea for the last 6 months or so. The CT a/p in the ED demonstrated a fat containing hernia, no intestinal involvement. He has had a repair of this in , has follow up with his surgeon again 19 who did the original hernia repair.     Fatigue  He continues to be quite fatigued and has stool color changes. Has no appetite, is also continuing to lose weight and feel quite weak.     No current outpatient medications on file.     No current facility-administered medications for this visit.      Past Medical History:   Diagnosis Date   • BPH (benign prostatic hyperplasia) 3/4/2016   • Cataract     fabiola lens surgery   • Low serum vitamin B12 3/15/2019   • Renal disorder 2015    kidney stone     Past Surgical History:   Procedure Laterality Date   • BREAST BIOPSY Left 2016    Procedure: BREAST BIOPSY;  Surgeon: Adrianne Dobbins M.D.;  Location: SURGERY [facility];  Service:    • INGUINAL HERNIA REPAIR ROBOTIC Bilateral 8/10/2016    Procedure: INGUINAL HERNIA REPAIR ROBOTIC with mesh  ;  Surgeon: Francis Fletcher M.D.;  Location: SURGERY [facility];  Service:    • OTHER  2000    anal cyst     Social History     Tobacco Use   • Smoking status: Never Smoker   • Smokeless tobacco: Never Used   Substance Use Topics   • Alcohol use: No     Alcohol/week: 0.0 oz     Comment: 0-2 glasses of wine per month   • Drug use: No     Social History     Social History Narrative    Retired       Family History   Problem Relation Age of Onset   • Other Mother         old age   • Heart Disease Father    • Stroke Father 86         from stroke   • Colon Cancer Father         colon cancer   • Cancer Maternal Uncle          "prostate   • Alzheimer's Disease Sister    • Cancer Brother         LUNG CANCER     Family Status   Relation Name Status   • Mo 92    • Fa 86    • MUnc     • Sis 81    • Bro 73    • Bro 77 Alive   • MGMo     • MGFa     • PGMo     • PGFa       Allergies: Patient has no known allergies.    ROS  As per HPI above. All other systems reviewed and negative.        Objective   /74   Pulse (!) 52   Temp 36 °C (96.8 °F)   Resp 14   Ht 1.791 m (5' 10.5\")   Wt 68.5 kg (151 lb)   SpO2 99%  Body mass index is 21.36 kg/m².    General: alert and oriented, pleasant, cooperative  HEENT: Normocephalic, atraumatic.   Psychiatric: appropriate mood and affect. Good insight and appropriate judgment     Assessment and Plan   The following treatment plan was discussed     1. Abdominal hernia without obstruction and without gangrene, recurrence not specified, unspecified hernia type  He is following up with his surgeon in a few weeks, however I am not convinced that this is the cause of his progressive declines.     2. Chronic fatigue  Given the mural thickening of the colon on recent CT a/p along with weight, constipation, change in bowel habits, and stool colors there is concern for GI malignancy which his cardiologist suspects as well. Last colonoscopy a year ago, referred to GI.     3. Mural thickening of colon  - REFERRAL TO GASTROENTEROLOGY      Healthcare maintenance     There are no preventive care reminders to display for this patient.    No follow-ups on file.    Varun Scott MD  Internal Medicine  [facility]                   "

## 2025-06-26 NOTE — ED NOTES
[facility]  ED Nurse Handoff Report    ED Chief complaint: Altered Mental Status and hypoxia      ED Diagnosis:   Final diagnoses:   Weakness generalized   Hypoxia   Near syncope       Code Status: Refer to hospitalist order    Allergies: No Known Allergies    Patient Story: BIBA from home after family found him hypoxic this morning O2 sats in 60s on home monitor and Fire confirmed low O2 saturations placed on NC 4 L currently 95%. Family also reported AMS and weakness. For EMS was hypotensive systolic 80-90s given 500mL fluids, and 4mg zofran for emesis en route.Hx liver transplant and CKD, .   Focused Assessment:  See chart    Treatments and/or interventions provided: Labs, meds, scans  Patient's response to treatments and/or interventions: Improved mentation improved oxygenation    To be done/followed up on inpatient unit:  Continued workup of symptoms    Does this patient have any cognitive concerns?: Forgetful    Activity level - Baseline/Home:  Unknown  Activity Level - Current:   Unknown    Patient's Preferred language: English   Needed?: No    Isolation: None  Infection: Not Applicable  Sepsis treatment initiated: No  Patient tested for COVID 19 prior to admission: NO  Bariatric?: No    Vital Signs:   Vitals:    06/26/25 1128   BP: 95/62   Pulse: 77   Resp: 20   Temp: 98.5  F (36.9  C)   TempSrc: Temporal   SpO2: 93%       Cardiac Rhythm:     Was the PSS-3 completed:   NO confused  What interventions are required if any?               Family Comments: NOne  OBS brochure/video discussed/provided to patient/family: N/A              Name of person given brochure if not patient: NA              Relationship to patient: NA    For the majority of the shift this patient's behavior was Green.   Behavioral interventions performed were None.    ED NURSE PHONE NUMBER: *[phone]

## 2025-06-26 NOTE — PROGRESS NOTES
RECEIVING UNIT ED HANDOFF REVIEW    ED Nurse Handoff Report was reviewed by: Priya West RN on June 26, 2025 at 5:54 PM

## 2025-06-26 NOTE — TELEPHONE ENCOUNTER
Pharmacist from [facility] called in to review the current dosing for the patient. Reviewed that last INR is from February and that the patient non compliant at this time for monitoring.    Mellissa Lee RN    [facility]

## 2025-06-26 NOTE — ED PROVIDER NOTES
"  Emergency Department Note      History of Present Illness     Chief Complaint   Altered Mental Status and hypoxia      HPI   Blanco Osborne is a 60 year old male presenting with wife and brother for evaluation for altered mental status and hypoxia. His wife states that the patient has not been breathing well since last night and his lips were pale. The patient also has weakness and couldn't stand up. His wife reports that the patient had a low hemoglobin level and suspects that it has dropped further. The patient also had a low oxygen level with percentages in the 60s, per wife. The patient has associated diaphoresis and loss of consciousness. His neck and back was \"hot\" last night. There is no chest pain, abdominal pain, blood in stool, urinary difficulty, head injury, fever, or changes in medications. His wife adds that the patient has bruises that darkened on his arms and that his throat was \"gurgling\".    Independent Historian   Wife, brother, and patient as detailed above.    Review of External Notes   I reviewed the discharge note from yesterday.    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)  Insomnia  Lactic acidosis  Liver replaced by transplant (H)  NAFLD (nonalcoholic fatty liver disease)  CHRISTIAN on CPAP  Parainfluenza type 1 infection  Parapneumonic effusion  Pleural effusion  Pneumothorax on left  Respiratory acidosis  Respiratory arrest " (H)  Restless legs syndrome (RLS)  SBP (spontaneous bacterial peritonitis) (H)  Seizures (H)  Sepsis due to Streptococcus pneumoniae with acute hypoxic respiratory failure (H)  Stroke, embolic (H)  Sudden cardiac arrest (H)  Tubular adenoma    Medications   aspirin 81 MG EC tablet  gabapentin (NEURONTIN) 100 MG capsule  hydrOXYzine HCl (ATARAX) 25 MG tablet  lacosamide (VIMPAT) 50 MG TABS tablet  melatonin 3 MG tablet  midodrine (PROAMATINE) 10 MG tablet  omeprazole (PRILOSEC) 40 MG DR capsule  oxyCODONE (ROXICODONE) 5 MG tablet  rOPINIRole (REQUIP) 0.5 MG tablet  sevelamer carbonate (RENVELA) 800 MG tablet  tacrolimus (GENERIC EQUIVALENT) 1 MG capsule  traZODone (DESYREL) 50 MG tablet  warfarin ANTICOAGULANT (COUMADIN) 2 MG tablet    Surgical History   Past Surgical History:   Procedure Laterality Date    APPENDECTOMY      BENCH LIVER N/A 09/20/2016    Procedure: BENCH LIVER;  Surgeon: Enoc Crews MD;  Location: [facility] OR    BRONCHOSCOPY FLEXIBLE AND RIGID N/A 12/20/2022    Procedure: BRONCHOSCOPY;  Surgeon: Alena Valenzuela MD;  Location:  GI    COLONOSCOPY  08/06/2013    repeat in 2018    COLONOSCOPY N/A 10/04/2019    Procedure: COLONOSCOPY, WITH POLYPECTOMY AND BIOPSY;  Surgeon: Go Chong MD;  Location: [facility] OR    COLONOSCOPY N/A 3/26/2024    Procedure: COLONOSCOPY, FLEXIBLE, WITH LESION REMOVAL USING SNARE;  Surgeon: Jie Douglas MD;  Location:  GI    CREATE FISTULA ARTERIOVENOUS UPPER EXTREMITY Left 03/21/2023    Procedure: LEFT UPPER EXTREMITY ARTERIOVENOUS FISTULA CREATION. LIGATION OF COMPETING VASCULAR BRANCHES- LEFT;  Surgeon: Deejay Mcneil MD;  Location:  OR    CV CORONARY ANGIOGRAM N/A 2/22/2024    Procedure: Coronary Angiogram;  Surgeon: Nima Dimas MD;  Location:  HEART CARDIAC CATH LAB    CV PERICARDIOCENTESIS N/A 9/29/2023    Procedure: Pericardiocentesis;  Surgeon: Barry Mckeon MD;  Location:  HEART CARDIAC CATH LAB    CV  PERICARDIOCENTESIS N/A 10/11/2023    Procedure: Pericardiocentesis;  Surgeon: Ulises Bhakta MD;  Location:  HEART CARDIAC CATH LAB    CV RIGHT HEART CATH MEASUREMENTS RECORDED N/A 10/11/2023    Procedure: Right Heart Catheterization;  Surgeon: Ulises Bhakta MD;  Location:  HEART CARDIAC CATH LAB    HERNIA REPAIR      IR CHEST TUBE PLACEMENT NON-TUNNELED RIGHT  2022    IR CVC TUNNEL PLACEMENT > 5 YRS OF AGE  2022    IR CVC TUNNEL PLACEMENT > 5 YRS OF AGE  1/3/2024    IR CVC TUNNEL PLACEMENT > 5 YRS OF AGE  2025    IR DIALYSIS FISTULOGRAM LEFT  2023    IR DIALYSIS FISTULOGRAM LEFT  2025    IR GASTROSTOMY TUBE CHANGE  2023    IR GASTROSTOMY TUBE PERCUTANEOUS PLCMNT  2022    IR PARACENTESIS  2022    IR PARACENTESIS  2022    IR PARACENTESIS  2/10/2016    IR PARACENTESIS  2016    IR THORACENTESIS  2022    IR THORACENTESIS  2020    IR THORACENTESIS  08/10/2020    IR TRANSCATHETER BIOPSY  2022    IR TRANSCATHETER BIOPSY  2024    REVISION FISTULA ARTERIOVENOUS UPPER EXTREMITY Left 8/15/2023    Procedure: 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 2; OUTFLOW REVISION CEPHALIC TO BRACHIAL VEIN;  Surgeon: Deejay Mcneil MD;  Location:  OR    REVISION FISTULA ARTERIOVENOUS UPPER EXTREMITY Left 1/3/2024    Procedure: Excision and repair of LEFT brachiocephalic arteriovenous fistula and vein patch angioplasty;  Surgeon: Deejay Mcneil MD;  Location:  OR    THROMBECTOMY UPPER EXTREMITY Left 2025    Procedure: Thrombectomy upper extremity, repair of aneurysm, left upper arm;  Surgeon: Deejay Mcneil MD;  Location:  OR    TRACHEOSTOMY N/A 2022    Procedure: TRACHEOSTOMY;  Surgeon: Nilesh Jackson MD;  Location:  OR    TRANSPLANT LIVER RECIPIENT  DONOR N/A 2016    Procedure: TRANSPLANT LIVER RECIPIENT  DONOR;  Surgeon: Enoc Crews MD;  Location:   OR       Physical Exam     Patient Vitals for the past 24 hrs:   BP Temp Temp src Pulse Resp SpO2   06/26/25 1900 (!) 81/50 98  F (36.7  C) Oral 63 13 100 %   06/26/25 1853 (!) 72/46 -- -- 54 -- 93 %   06/26/25 1845 (!) 67/42 -- -- 58 -- 100 %   06/26/25 1833 (!) 70/42 -- -- 62 -- 96 %   06/26/25 1819 93/85 -- -- (!) 122 -- 98 %   06/26/25 1800 (!) 83/49 -- -- 61 -- 98 %   06/26/25 1750 (!) 84/41 -- -- 120 -- 98 %   06/26/25 1745 (!) 76/55 -- -- 64 -- 97 %   06/26/25 1735 (!) 75/42 -- -- 65 -- 93 %   06/26/25 1730 (!) 68/46 -- -- 66 -- --   06/26/25 1715 108/83 -- -- 68 27 --   06/26/25 1700 91/51 -- -- (!) 150 -- 100 %   06/26/25 1645 94/59 -- -- 68 -- 100 %   06/26/25 1639 (!) 83/57 -- -- 65 -- 100 %   06/26/25 1630 (!) 79/62 97.9  F (36.6  C) Oral 66 -- 100 %   06/26/25 1600 89/51 -- -- 72 21 --   06/26/25 1200 90/62 -- -- 78 -- 94 %   06/26/25 1128 95/62 98.5  F (36.9  C) Temporal 77 20 93 %     Physical Exam  GENERAL: Patient appears frail, slightly confused but will answer questions.  Hard of hearing.  HEAD: Atraumatic.  NECK: No rigidity  CV: RRR, no murmurs, rubs or gallops  PULM: Breathing comfortably.  Minimal crackles in lung bases.  No respiratory distress.  ABD: Soft, nontender, nondistended, no guarding  DERM: Dressing dry and intact in left upper extremity.  Chronic appearing bruising at surgical incision site with edema without tenderness.   EXTREMITY: Moving all extremities without difficulty. No calf tenderness or peripheral edema        Diagnostics     Lab Results   Labs Ordered and Resulted from Time of ED Arrival to Time of ED Departure   COMPREHENSIVE METABOLIC PANEL - Abnormal       Result Value    Sodium 132 (*)     Potassium 4.2      Carbon Dioxide (CO2) 26      Anion Gap 15      Urea Nitrogen 25.4 (*)     Creatinine 5.64 (*)     GFR Estimate 11 (*)     Calcium 8.7 (*)     Chloride 91 (*)     Glucose 192 (*)     Alkaline Phosphatase 208 (*)      (*)     ALT 54      Protein Total 7.1       Albumin 4.0      Bilirubin Total 1.0     NT-PROBNP - Abnormal    NT-proBNP 18,384 (*)    ISTAT GASES LACTATE VENOUS POCT - Abnormal    Lactic Acid POCT 1.4      Bicarbonate Venous POCT 29 (*)     O2 Sat, Venous POCT 61 (*)     pCO2 Venous POCT 59 (*)     pH Venous POCT 7.30 (*)     pO2 Venous POCT 36      Base Excess/Deficit (+/-) POCT 2.0     INR - Abnormal    INR 1.47 (*)     PT 17.4 (*)    CBC WITH PLATELETS AND DIFFERENTIAL - Abnormal    WBC Count 9.2      RBC Count 2.25 (*)     Hemoglobin 7.6 (*)     Hematocrit 24.6 (*)      (*)     MCH 33.8 (*)     MCHC 30.9 (*)     RDW 15.9 (*)     Platelet Count 105 (*)     % Neutrophils 74      % Lymphocytes 9      % Monocytes 14      % Eosinophils 0      % Basophils 0      % Immature Granulocytes 2      NRBCs per 100 WBC 2 (*)     Absolute Neutrophils 6.8      Absolute Lymphocytes 0.9      Absolute Monocytes 1.3      Absolute Eosinophils 0.0      Absolute Basophils 0.0      Absolute Immature Granulocytes 0.2      Absolute NRBCs 0.1     AMMONIA - Normal    Ammonia 43     INR    INR        PT       CBC WITH PLATELETS AND DIFFERENTIAL   BLOOD CULTURE   BLOOD CULTURE       Imaging   CT Head w/o Contrast   Final Result   IMPRESSION:   1.  Age-related changes as above with no acute intracranial abnormality.         XR Chest 2 Views   Final Result   IMPRESSION: Cardiac silhouette is enlarged, not significantly changed from prior exam. Tunneled dialysis catheter with tip overlying the right atrium. There is pulmonary vascular congestion with small bilateral pleural effusions, right greater than left.    No lobar airspace consolidation or pneumothorax. No acute bony abnormality.          EKG   ECG taken at 1351, ECG read at 1356  Normal sinus rhythm  Low voltage QRS  Borderline ECG   Rate 73 bpm. NC interval 196 ms. QRS duration 74 ms. QT/QTc 410/451 ms. P-R-T axes 49 40 49.    Independent Interpretation   CT Head: No intracranial hemorrhage.  X-ray chest 2 views shows  cardiomegaly and pulmonary congestion.    ED Course      Medications Administered   Medications   oxyCODONE (ROXICODONE) tablet 5 mg (has no administration in time range)   acetaminophen (TYLENOL) tablet 650 mg (has no administration in time range)     Or   acetaminophen (TYLENOL) Suppository 650 mg (has no administration in time range)   sodium chloride 0.9% BOLUS 100-150 mL (200 mLs Intravenous $New Bag 6/26/25 1845)   sodium chloride (PF) 0.9% PF flush 9 mL (has no administration in time range)   sodium chloride (PF) 0.9% PF flush 9 mL (has no administration in time range)   sodium chloride (PF) 0.9% PF flush 10 mL (has no administration in time range)   sodium chloride (PF) 0.9% PF flush 10 mL (has no administration in time range)   midodrine (PROAMATINE) tablet 30 mg (has no administration in time range)   midodrine (PROAMATINE) tablet 10 mg (has no administration in time range)   sodium chloride 0.9% BOLUS 500 mL (0 mLs Intravenous Stopped 6/26/25 1519)   midodrine (PROAMATINE) tablet 10 mg (10 mg Oral $Given 6/26/25 1443)   sodium chloride 0.9% BOLUS 200 mL (1,000 mLs Hemodialysis Machine Not Given 6/26/25 1723)   sodium chloride 0.9% BOLUS 500 mL (0 mLs Hemodialysis Machine Stopped 6/26/25 1843)   midodrine (PROAMATINE) tablet 10 mg (10 mg Oral $Given 6/26/25 1740)   midodrine (PROAMATINE) tablet 30 mg (30 mg Oral $Given 6/26/25 1622)       Procedures   Procedures     Discussion of Management   I discussed admission and the plan of care with the Hospitalist Dr. Waldrop      ED Course   ED Course as of 06/26/25 2004   u Jun 26, 2025   1206 I have obtained history and performed physical exam.   1255 I rechecked the patient.       Additional Documentation  None    Medical Decision Making / Diagnosis     CMS Diagnoses: None    MIPS   None               MDM   Blanco Osborne is a 60 year old male with complicated medical history, discharged in the hospital yesterday, returns for hypotension, weakness,  hypoxia.  On nasal cannula, he is satting well in the 90s.  His blood pressure has been soft, which does appear chronic.  He takes midodrine for this.  I did give him a dose of midodrine here.  He did get a little bit of fluids.  Lower extremities do not appear swollen.  Breathing comfortably.  Bedside echo showing decent squeeze with no pericardial effusion.  BNP is significantly elevated, therefore held off on continued fluids.  Ultimately, he had multiple days of missing dialysis and then restarted.  I think he will need more equilibration and continued dialysis.  Therefore discussed with hospitalist for admission.  Patient was dialyzed.  His white count is normal.  His lactate within normal limits.  No fever.  Do not think this is infection.  Do not think he requires antibiotics.  For his altered mental status, this does appear to be chronic in discussion with family but CT head thankfully negative for bleed.    Disposition   The patient was admitted to the hospital.     Diagnosis     ICD-10-CM    1. Weakness generalized  R53.1       2. Hypoxia  R09.02       3. Near syncope  R55       4. Hypervolemia, unspecified hypervolemia type  E87.70            Discharge Medications   New Prescriptions    No medications on file         Scribe Disclosure:  I, Roselia Moeller, am serving as a scribe at 1:58 PM on 6/26/2025 to document services personally performed by Blanco Marquez MD based on my observations and the provider's statements to me.        Blanco Marquez MD  06/26/25 2004  Patient's blood pressure in the 70s to 80s after dialysis.  Likely combination of liver failure plus dialysis and the volume removal.  He did complain of some abdominal pain.  However now improved.  He states this is a chronic issue.  Discussed with hospitalist and they will obtain a CT scan of the abdomen pelvis prior to being admitted.  Did bedside ultrasound.  Patient has known ascites.  Ultrasound did appear normal caliber.  He does not complain of  severe pain.  If CT scan reassuring, plan is for IMC.     Blanco Marquez MD  06/26/25 2030

## 2025-06-26 NOTE — TELEPHONE ENCOUNTER
"Called Blanco again the following day. Wife answer. She states \"we are trying to take Blanco back into the hospital\" When asked what is going on, she stated that his O2 sats are in 60s and he is confused. When asked what she meant by \"trying to bring him in\", she stated that Blanco's brother was coming to help her bring him to the ED. This writer advised she should call 911 without delay. She stated understanding and is in agreement with the plan. Ofe hung up to place call.    Routing to Primary Care Provider, Ki Carter PA-C, as FYI.    Mar NIEVES RN  Anticoagulation Team    "

## 2025-06-26 NOTE — CONSULTS
RENAL CONSULTATION NOTE      REFERRING MD:  ED and Dr. 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    -He normally runs at [facility] on a MWF basis.    -L avf.  3.5 H.  16 ga needles. .  TW 89.5 kfg.     2) Clotted AVF.  Last US 10/10/2024 showed focal stenosis in cephalic vein/brachial vein anastomosis. Declotted  and aneurysm repair  by Dr. Mcneil.      Unable to declot 06.23.25  Revision per Dr. Alex 06.24.25  TDC placement      3) Anemia: HGB 9.4 to 7.3. Presume some losses  with procedures.   He is on MIrcera 30 mcg Q2W as outpt.       4) Hyponatremia:  Due to reduced water clearance in ESRD. This will  correct in HD    5) Chronic hypotension  6) Acute hypoxic respiratory failure   7) Encephalopathy    -increased confusion per family      Current electrolytes are not problematic  No urgent need for dialysis    We will plan a 2-hour ultrafiltration run  UF goal 2 L  Pre and intra run midodrine as indicated  5% albumin prime    Next dialysis to run either 06.27.25 versus 06.28.25  Will assess in the a.m.      HPI:     Known ESRD  Recent issues related to his aVF  These have been outlined in Dr. Moulton's recent evaluation  Unable to declot per IR  Revision undertaken as noted  Tunneled dialysis catheter placed  Ran inpatient at this facility 06.25.25    Previous dialysis prior to that appears to have been on 06.18.25    Seen in ED  Brother and wife present  Discussed at bedside with Dr. Waldrop    Patient awake and alert  Follows commands  Family describes intermittent confusion  Some weakness    Presented to the ED for evaluation of ongoing weakness and shortness of breath  Adequate sats on nasal cannula oxygen  Chest x-ray reviewed  Per family patient typically accumulates fluid in his abdomen      ROS:  A complete 10 point review of systems was performed and is negative except as noted above.    PMH:    Past Medical History:   Diagnosis Date     Acquired immunocompromised state 11/19/2022    Acute renal failure, unspecified acute renal failure type 11/12/2022    Antiplatelet or antithrombotic long-term use     Arrhythmia     PEA    Ascites     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    Cancer (H) 2023    Squamous Cell Cancer- Since resolved    Cirrhosis of liver with ascites (H) 02/11/2016    Coagulopathy     Critical illness myopathy     Dialysis patient     DIALYSIS 3X/ WEEK    Embolic stroke (H) 11/20/2022    Encephalopathy     ESRD (end stage renal disease) on dialysis (H)     Gastroesophageal reflux disease     H/O alcohol abuse     History of blood transfusion     Hyperammonemia     Hyperlipidemia     Hypertension     Patients states that HTN has been resolved    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    Insomnia     Lactic acidosis 11/12/2022    Liver replaced by transplant (H) 09/20/2016    NAFLD (nonalcoholic fatty liver disease) 02/11/2016    CHRISTIAN on CPAP     Parainfluenza type 1 infection 11/19/2022    Parapneumonic effusion     Pleural effusion     Pneumothorax on left 12/16/2022    Respiratory acidosis 11/12/2022    Respiratory arrest (H) 11/12/2022    Restless legs syndrome (RLS)     SBP (spontaneous bacterial peritonitis) (H)     MNGI    Seizures (H) 12/2022    Sepsis due to Streptococcus pneumoniae with acute hypoxic respiratory failure (H) 11/19/2022    Stroke, embolic (H) 11/20/2022    Sudden cardiac arrest (H) 12/29/2022    PEA- 2 min of CPR    Tubular adenoma 10/2019    Large cecal adenoma- due for surveillance colonoscopy in 3 years (10/2022)       PSH:    Past Surgical History:   Procedure Laterality Date    APPENDECTOMY      BENCH LIVER N/A 09/20/2016    Procedure: BENCH LIVER;  Surgeon: Enoc Crews MD;  Location: [facility] OR    BRONCHOSCOPY FLEXIBLE AND RIGID N/A 12/20/2022    Procedure: BRONCHOSCOPY;  Surgeon: Alena Valenzuela MD;  Location:  GI    COLONOSCOPY  08/06/2013    repeat in 2018    COLONOSCOPY N/A  10/04/2019    Procedure: COLONOSCOPY, WITH POLYPECTOMY AND BIOPSY;  Surgeon: Go Chong MD;  Location: [facility] OR    COLONOSCOPY N/A 3/26/2024    Procedure: COLONOSCOPY, FLEXIBLE, WITH LESION REMOVAL USING SNARE;  Surgeon: Jie Douglas MD;  Location:  GI    CREATE FISTULA ARTERIOVENOUS UPPER EXTREMITY Left 03/21/2023    Procedure: LEFT UPPER EXTREMITY ARTERIOVENOUS FISTULA CREATION. LIGATION OF COMPETING VASCULAR BRANCHES- LEFT;  Surgeon: Deejay Mcneil MD;  Location:  OR    CV CORONARY ANGIOGRAM N/A 2/22/2024    Procedure: Coronary Angiogram;  Surgeon: Nima Dimas MD;  Location:  HEART CARDIAC CATH LAB    CV PERICARDIOCENTESIS N/A 9/29/2023    Procedure: Pericardiocentesis;  Surgeon: Barry Mckeon MD;  Location:  HEART CARDIAC CATH LAB    CV PERICARDIOCENTESIS N/A 10/11/2023    Procedure: Pericardiocentesis;  Surgeon: Ulises Bhakta MD;  Location:  HEART CARDIAC CATH LAB    CV RIGHT HEART CATH MEASUREMENTS RECORDED N/A 10/11/2023    Procedure: Right Heart Catheterization;  Surgeon: Ulises Bhakta MD;  Location:  HEART CARDIAC CATH LAB    HERNIA REPAIR      IR CHEST TUBE PLACEMENT NON-TUNNELED RIGHT  12/12/2022    IR CVC TUNNEL PLACEMENT > 5 YRS OF AGE  12/06/2022    IR CVC TUNNEL PLACEMENT > 5 YRS OF AGE  1/3/2024    IR CVC TUNNEL PLACEMENT > 5 YRS OF AGE  6/23/2025    IR DIALYSIS FISTULOGRAM LEFT  07/13/2023    IR DIALYSIS FISTULOGRAM LEFT  6/23/2025    IR GASTROSTOMY TUBE CHANGE  02/08/2023    IR GASTROSTOMY TUBE PERCUTANEOUS PLCMNT  11/29/2022    IR PARACENTESIS  11/29/2022    IR PARACENTESIS  12/01/2022    IR PARACENTESIS  2/10/2016    IR PARACENTESIS  2/28/2016    IR THORACENTESIS  12/06/2022    IR THORACENTESIS  09/01/2020    IR THORACENTESIS  08/10/2020    IR TRANSCATHETER BIOPSY  12/01/2022    IR TRANSCATHETER BIOPSY  4/9/2024    REVISION FISTULA ARTERIOVENOUS UPPER EXTREMITY Left 8/15/2023    Procedure: REPAIR OF LEFT ARM FISTULA  PSEUDOANEURYSM x 2; OUTFLOW REVISION CEPHALIC TO BRACHIAL VEIN;  Surgeon: Deejay Mcneil MD;  Location: [facility] OR    REVISION FISTULA ARTERIOVENOUS UPPER EXTREMITY Left 1/3/2024    Procedure: Excision and repair of LEFT brachiocephalic arteriovenous fistula and vein patch angioplasty;  Surgeon: Deejay Mcneil MD;  Location: [facility] OR    THROMBECTOMY UPPER EXTREMITY Left 2025    Procedure: Thrombectomy upper extremity, repair of aneurysm, left upper arm;  Surgeon: Deejay Mcneil MD;  Location: [facility] OR    TRACHEOSTOMY N/A 2022    Procedure: TRACHEOSTOMY;  Surgeon: Nilesh Jackson MD;  Location: [facility] OR    TRANSPLANT LIVER RECIPIENT  DONOR N/A 2016    Procedure: TRANSPLANT LIVER RECIPIENT  DONOR;  Surgeon: Enoc Crews MD;  Location: [facility] OR       MEDICATIONS:    Current Outpatient Medications   Medication Sig Dispense Refill    acetaminophen (TYLENOL) 325 MG tablet Take 2 tablets (650 mg) by mouth every 8 hours as needed for other (For optimal non-opioid multimodal pain management to improve pain control.)      aspirin 81 MG EC tablet Take 1 tablet (81 mg) by mouth daily 90 tablet 3    calcium carbonate (TUMS) 500 MG chewable tablet Take 1 chew tab by mouth 4 times daily as needed for heartburn.      famotidine (PEPCID) 20 MG tablet Take 20 mg by mouth 2 times daily as needed.      gabapentin (NEURONTIN) 100 MG capsule Take 300 mg by mouth nightly as needed for other (RLS).      melatonin 3 MG tablet Take 3 mg by mouth nightly as needed for sleep.      midodrine (PROAMATINE) 10 MG tablet TAKE 1 TABLET 3X DAILY. ON DAILYSIS DAYS(M,W,) TAKE 2 EXTRA TABLETS 1 HOUR BEFORE,1 TABLET WHEN YOU ARRIVE, AND 1 TABLET DURING DIALYSIS. 450 tablet 1    multivitamin RENAL (TRIPHROCAPS) 1 capsule capsule TAKE 1 CAPSULE BY MOUTH DAILY 90 capsule 3    omeprazole (PRILOSEC) 40 MG DR capsule TAKE 1 CAPSULE(40 MG) BY MOUTH DAILY 90 capsule 1    oxyCODONE (ROXICODONE) 5 MG  tablet Take 1 tablet (5 mg) by mouth every 6 hours as needed for moderate pain.      tacrolimus (GENERIC EQUIVALENT) 1 MG capsule TAKE 1 CAPSULE BY MOUTH EVERY 12 HOURS 180 capsule 3    warfarin ANTICOAGULANT (COUMADIN/JANTOVEN) 2 MG tablet Take 8 mg by mouth every Monday and Friday, 6 mg by mouth all other days         ALLERGIES:    Allergies as of 06/26/2025    (No Known Allergies)       FH:    Family History   Problem Relation Age of Onset    Coronary Artery Disease No family hx of     Cardiomyopathy No family hx of     Anesthesia Reaction No family hx of     Deep Vein Thrombosis (DVT) No family hx of        SH:    Social History     Socioeconomic History    Marital status:      Spouse name: Not on file    Number of children: Not on file    Years of education: Not on file    Highest education level: Not on file   Occupational History    Not on file   Tobacco Use    Smoking status: Never     Passive exposure: Never    Smokeless tobacco: Never   Vaping Use    Vaping status: Never Used   Substance and Sexual Activity    Alcohol use: Not Currently     Alcohol/week: 0.0 standard drinks of alcohol    Drug use: No    Sexual activity: Not Currently     Partners: Female   Other Topics Concern    Parent/sibling w/ CABG, MI or angioplasty before 65F 55M? Not Asked   Social History Narrative    Not on file     Social Drivers of Health     Financial Resource Strain: Low Risk  (1/16/2024)    Financial Resource Strain     Within the past 12 months, have you or your family members you live with been unable to get utilities (heat, electricity) when it was really needed?: No   Food Insecurity: No Food Insecurity (4/18/2025)    Received from [facility]    Hunger Vital Sign     Worried About Running Out of Food in the Last Year: Never true     Ran Out of Food in the Last Year: Never true   Transportation Needs: No Transportation Needs (4/18/2025)    Received from [facility]    PRAPARE - Transportation     Lack of  Transportation (Medical): No     Lack of Transportation (Non-Medical): No   Physical Activity: Insufficiently Active (4/18/2025)    Received from [facility]    Exercise Vital Sign     Days of Exercise per Week: 2 days     Minutes of Exercise per Session: 10 min   Stress: Not on file   Social Connections: Not on file   Interpersonal Safety: Low Risk  (11/19/2024)    Interpersonal Safety     Do you feel physically and emotionally safe where you currently live?: Yes     Within the past 12 months, have you been hit, slapped, kicked or otherwise physically hurt by someone?: No     Within the past 12 months, have you been humiliated or emotionally abused in other ways by your partner or ex-partner?: No   Housing Stability: Low Risk  (4/18/2025)    Received from [facility]    Housing Stability     What is your living situation today?: I have a steady place to live       PHYSICAL EXAM:      Vitals were reviewed  Patient Vitals for the past 8 hrs:   BP Temp Temp src Pulse Resp SpO2   06/26/25 1128 95/62 98.5  F (36.9  C) Temporal 77 20 93 %     Wt Readings from Last 4 Encounters:   06/24/25 93.5 kg (206 lb 3.2 oz)   03/20/25 88.7 kg (195 lb 8 oz)   02/14/25 92.8 kg (204 lb 8 oz)   12/31/24 93.7 kg (206 lb 8 oz)     No intake/output data recorded.      There were no vitals filed for this visit.      GENERAL: awake, alert, follows  HEENT: NC/AT, PERRLA, EOMI, non icteric, pharynx moist without lesion  RESP:  clear anteriorly  CV: RRR, normal S1 S2  ABDOMEN: distended/soft  MS: no clubbing, cyanosis   SKIN: clear without significant rashes or lesions  NEURO: speech normal and cranial nerves 2-12 intact  PSYCH: follows  EXT: trace right      LABS:        Recent Labs   Lab 06/26/25  1302   *   POTASSIUM 4.2   CHLORIDE 91*   CO2 26   ANIONGAP 15   *   BUN 25.4*   CR 5.64*   GFRESTIMATED 11*   KELLY 8.7*     Recent Labs   Lab 06/26/25  1302 06/25/25  0713   POTASSIUM 4.2 4.3     Recent Labs   Lab 06/26/25  1302    ALBUMIN 4.0     Recent Labs   Lab 06/26/25  1302 06/25/25  0713 06/24/25  0650 06/22/25 2004   WBC 9.2  --  9.2 4.1   HGB 7.6* 7.3* 8.8* 9.4*   HCT 24.6*  --  26.2* 27.6*   * 108* 104* 99   *  --  86* 77*       DIAGNOSTICS:  Reviewed      MIKEL Reilly    [facility]  [phone]

## 2025-06-26 NOTE — H&P
[facility]    History and Physical - Hospitalist Service       Date of Admission:  6/26/2025    Assessment & Plan      Blanco Osborne is a 60 year old male with history of ESRD, liver transplant, previous CVA, pAfib, seizure disorder, GERD, HTN who presented to the hospital just 1 day after discharge for SOB.    Acute hypoxic respiratory failure likely due to volume overload  Presents from home for SOB. EMS noted oxygen sats in the 60s on arrival. In the ED, CXR with pulmonary vascular congestion and small bilateral effusions. BNP ~18,000 which is increased from ~4000 4 days prior. Normal lactic acid and no signs of infection. He normally dialyzes MWF and last dialysis the day prior in the hospital with 3L of fluid removed    - patient does not make urine will defer lasix dosing  - nephrology consulted for dialysis, may need goal weight adjusted or increased midodrine to facilitate more fluid removal    Chronic hypotension  Typically takes midodrine on dialysis days and sometime he takes extra doses as needed for symptoms. His average systolic BP is 80s-90s and he can feels symptomatic with BP in the 80s  - BP low but at his baseline in the ED  - will start 10mg midodrine TID daily for now instead of just on dialysis days and continue additional 30mg on dialysis days follow vitals  - no signs of sepsis or infection however will check a set of blood cultures on admission    After dialysis patient's SBP persistently into the 70s despite his midodrine. He remains lethargic but wife believes his mentation is better. He complains of new onset abdominal pain with guarding noted on deep palpation. LFTs and lactic acid on admission normal. Will now check CT a/p.    BP persistently low despite 40mg of midodrine. Most values into the 70s and patient more confused. New ascites on CT now worrisome from possible SBP. No ICU beds at [facility], will transfer to [facility] for ICU bed. Will start peripheral  "levofed in the ER and give dose of rocephin    ESRD on HD  Recent thrombectomy to fistula with aneurysm repair    - dialysis through [facility] for now  - consult nephrology  - resume PTA renvela  - dry weight list as 89.5kg. Last recorded weight 06/24 was 93.5kg. Will get new standing weight here    Chronic anemia  Chronic thrombocytopenia  Hb baseline ~9.0, slightly lower since his thrombectomy with reported small hematoma however Hb stable now since discharge  Platelets are stable  - EPO per nephrology  - monitor labs    Hx of CVA  pAfib  Currently in the work up for a watchman and he was holding warfarin for some of this work up however per family he should be taking warfarin but he is not. Will resume warfarin here with pharmacy to dose. EKG in the ER with NSR  - continue PTA warfarin    Seizure disorder  - resume PTA vimpat. Of note patient is currently \"weaning of\" per his primary neurologist and taking every other day. Will continue this dose for now    Cirrhosis s/p liver transplant in 2016  - resume PTA tacrolimus    GERD  - resume pta omeprazole    CHRISTIAN  - home cpap    RLS  - ropinirole          Diet:  regular  DVT Prophylaxis: Warfarin  Nixon Catheter: Not present  Lines: PRESENT             Cardiac Monitoring: None  Code Status:  FULL    Clinically Significant Risk Factors Present on Admission         # Hyponatremia: Lowest Na = 131 mmol/L in last 2 days, will monitor as appropriate  # Hypochloremia: Lowest Cl = 91 mmol/L in last 2 days, will monitor as appropriate  # Hypocalcemia: Lowest Ca = 8.5 mg/dL in last 2 days, will monitor and replace as appropriate      # Drug Induced Coagulation Defect: home medication list includes an anticoagulant medication  # Drug Induced Platelet Defect: home medication list includes an antiplatelet medication   # Hypertension: Noted on problem list      # Anemia: based on hgb <11       # Overweight: Estimated body mass index is 27.97 kg/m  as calculated from the following:    " Height as of 6/24/25: 1.829 m (6').    Weight as of 6/24/25: 93.5 kg (206 lb 3.2 oz).       # Financial/Environmental Concerns:           Disposition Plan     Medically Ready for Discharge: Anticipated in 2-4 Days           Meredith Waldrop DO  Hospitalist Service  [facility]  Securely message with [employer] (more info)  Text page via [facility] Paging/Directory     ______________________________________________________________________    Chief Complaint   SOB    History is obtained from the patient, chart family and ER doctor    History of Present Illness   Blanco Osborne is a 60 year old male with history of ESRD, liver transplant, previous CVA, pAfib, seizure disorder, GERD, HTN who presented to the hospital just 1 day after discharge for SOB.    Upon my interview with patient is peaking with the nephrologist. He is lethargic and some responses are slow but he is oriented. Family reports this is his normal when his BP is low. They report he was very fidgety last night on his first night back. Patient says he just started feeling really bad today. Tired, fatigued and not right. No fever or chills. No rigors. No cough. He thought he couldn't catch his breath.       Past Medical History    Past Medical History:   Diagnosis Date    Acquired immunocompromised state 11/19/2022    Acute renal failure, unspecified acute renal failure type 11/12/2022    Antiplatelet or antithrombotic long-term use     Arrhythmia     PEA    Ascites     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    Cancer (H) 2023    Squamous Cell Cancer- Since resolved    Cirrhosis of liver with ascites (H) 02/11/2016    Coagulopathy     Critical illness myopathy     Dialysis patient     DIALYSIS 3X/ WEEK    Embolic stroke (H) 11/20/2022    Encephalopathy     ESRD (end stage renal disease) on dialysis (H)     Gastroesophageal reflux disease     H/O alcohol abuse     History of blood transfusion     Hyperammonemia     Hyperlipidemia      Hypertension     Patients states that HTN has been resolved    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    Insomnia     Lactic acidosis 11/12/2022    Liver replaced by transplant (H) 09/20/2016    NAFLD (nonalcoholic fatty liver disease) 02/11/2016    CHRISTIAN on CPAP     Parainfluenza type 1 infection 11/19/2022    Parapneumonic effusion     Pleural effusion     Pneumothorax on left 12/16/2022    Respiratory acidosis 11/12/2022    Respiratory arrest (H) 11/12/2022    Restless legs syndrome (RLS)     SBP (spontaneous bacterial peritonitis) (H)     MNGI    Seizures (H) 12/2022    Sepsis due to Streptococcus pneumoniae with acute hypoxic respiratory failure (H) 11/19/2022    Stroke, embolic (H) 11/20/2022    Sudden cardiac arrest (H) 12/29/2022    PEA- 2 min of CPR    Tubular adenoma 10/2019    Large cecal adenoma- due for surveillance colonoscopy in 3 years (10/2022)       Past Surgical History   Past Surgical History:   Procedure Laterality Date    APPENDECTOMY      BENCH LIVER N/A 09/20/2016    Procedure: BENCH LIVER;  Surgeon: Enoc Crews MD;  Location: [facility] OR    BRONCHOSCOPY FLEXIBLE AND RIGID N/A 12/20/2022    Procedure: BRONCHOSCOPY;  Surgeon: Alena Valenzuela MD;  Location:  GI    COLONOSCOPY  08/06/2013    repeat in 2018    COLONOSCOPY N/A 10/04/2019    Procedure: COLONOSCOPY, WITH POLYPECTOMY AND BIOPSY;  Surgeon: Go Chong MD;  Location: [facility] OR    COLONOSCOPY N/A 3/26/2024    Procedure: COLONOSCOPY, FLEXIBLE, WITH LESION REMOVAL USING SNARE;  Surgeon: Jie Douglas MD;  Location:  GI    CREATE FISTULA ARTERIOVENOUS UPPER EXTREMITY Left 03/21/2023    Procedure: LEFT UPPER EXTREMITY ARTERIOVENOUS FISTULA CREATION. LIGATION OF COMPETING VASCULAR BRANCHES- LEFT;  Surgeon: Deejay Mcneil MD;  Location:  OR    CV CORONARY ANGIOGRAM N/A 2/22/2024    Procedure: Coronary Angiogram;  Surgeon: Nima Dimas MD;  Location:  HEART CARDIAC CATH LAB    CV  PERICARDIOCENTESIS N/A 9/29/2023    Procedure: Pericardiocentesis;  Surgeon: Barry Mckeon MD;  Location:  HEART CARDIAC CATH LAB    CV PERICARDIOCENTESIS N/A 10/11/2023    Procedure: Pericardiocentesis;  Surgeon: Ulises Bhakta MD;  Location:  HEART CARDIAC CATH LAB    CV RIGHT HEART CATH MEASUREMENTS RECORDED N/A 10/11/2023    Procedure: Right Heart Catheterization;  Surgeon: Ulises Bhakta MD;  Location:  HEART CARDIAC CATH LAB    HERNIA REPAIR      IR CHEST TUBE PLACEMENT NON-TUNNELED RIGHT  12/12/2022    IR CVC TUNNEL PLACEMENT > 5 YRS OF AGE  12/06/2022    IR CVC TUNNEL PLACEMENT > 5 YRS OF AGE  1/3/2024    IR CVC TUNNEL PLACEMENT > 5 YRS OF AGE  6/23/2025    IR DIALYSIS FISTULOGRAM LEFT  07/13/2023    IR DIALYSIS FISTULOGRAM LEFT  6/23/2025    IR GASTROSTOMY TUBE CHANGE  02/08/2023    IR GASTROSTOMY TUBE PERCUTANEOUS PLCMNT  11/29/2022    IR PARACENTESIS  11/29/2022    IR PARACENTESIS  12/01/2022    IR PARACENTESIS  2/10/2016    IR PARACENTESIS  2/28/2016    IR THORACENTESIS  12/06/2022    IR THORACENTESIS  09/01/2020    IR THORACENTESIS  08/10/2020    IR TRANSCATHETER BIOPSY  12/01/2022    IR TRANSCATHETER BIOPSY  4/9/2024    REVISION FISTULA ARTERIOVENOUS UPPER EXTREMITY Left 8/15/2023    Procedure: 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 2; OUTFLOW REVISION CEPHALIC TO BRACHIAL VEIN;  Surgeon: Deejay Mcneil MD;  Location:  OR    REVISION FISTULA ARTERIOVENOUS UPPER EXTREMITY Left 1/3/2024    Procedure: Excision and repair of LEFT brachiocephalic arteriovenous fistula and vein patch angioplasty;  Surgeon: Deejay Mcneil MD;  Location:  OR    THROMBECTOMY UPPER EXTREMITY Left 6/24/2025    Procedure: Thrombectomy upper extremity, repair of aneurysm, left upper arm;  Surgeon: Deejay Mcneil MD;  Location:  OR    TRACHEOSTOMY N/A 11/29/2022    Procedure: TRACHEOSTOMY;  Surgeon: Nilesh Jackson MD;  Location:  OR    TRANSPLANT LIVER  RECIPIENT  DONOR N/A 2016    Procedure: TRANSPLANT LIVER RECIPIENT  DONOR;  Surgeon: Enoc Crews MD;  Location: [facility] OR       Prior to Admission Medications   Prior to Admission Medications   Prescriptions Last Dose Informant Patient Reported? Taking?   acetaminophen (TYLENOL) 325 MG tablet   Yes No   Sig: Take 2 tablets (650 mg) by mouth every 8 hours as needed for other (For optimal non-opioid multimodal pain management to improve pain control.)   aspirin 81 MG EC tablet   No No   Sig: Take 1 tablet (81 mg) by mouth daily   gabapentin (NEURONTIN) 100 MG capsule   No No   Sig: TAKE 3 CAPSULES(300 MG) BY MOUTH AT BEDTIME   hydrOXYzine HCl (ATARAX) 25 MG tablet   No No   Sig: Take 1 tablet (25 mg) by mouth 3 times daily as needed for itching.   lacosamide (VIMPAT) 50 MG TABS tablet   No No   Sig: Take 1 tablet (50 mg) by mouth 2 times daily.   melatonin 3 MG tablet  Spouse/Significant Other No No   Sig: Take 1 tablet (3 mg) by mouth At Bedtime   midodrine (PROAMATINE) 10 MG tablet   No No   Sig: TAKE 1 TABLET 3X DAILY. ON DAILYSIS DAYS(M,W,) TAKE 2 EXTRA TABLETS 1 HOUR BEFORE,1 TABLET WHEN YOU ARRIVE, AND 1 TABLET DURING DIALYSIS.   multivitamin RENAL (TRIPHROCAPS) 1 capsule capsule   No No   Sig: TAKE 1 CAPSULE BY MOUTH DAILY   omeprazole (PRILOSEC) 40 MG DR capsule   No No   Sig: TAKE 1 CAPSULE(40 MG) BY MOUTH DAILY   oxyCODONE (ROXICODONE) 5 MG tablet   Yes No   Sig: Take 1 tablet (5 mg) by mouth every 6 hours as needed for moderate pain.   rOPINIRole (REQUIP) 0.5 MG tablet   No No   Sig: TAKE 1 TABLET(0.5 MG) BY MOUTH TWICE DAILY   sevelamer carbonate (RENVELA) 800 MG tablet   No No   Sig: Take 1 tablet (800 mg) by mouth 4 times daily With meals and snacks   tacrolimus (GENERIC EQUIVALENT) 1 MG capsule   No No   Sig: TAKE 1 CAPSULE BY MOUTH EVERY 12 HOURS   traZODone (DESYREL) 50 MG tablet   No No   Sig: Take 1 tablet (50 mg) by mouth nightly as needed for sleep.   warfarin  ANTICOAGULANT (COUMADIN) 2 MG tablet   No No   Si mg Mon, Fri; 6 mg all other days or as directed by INR clinic      Facility-Administered Medications: None        Review of Systems    The 10 point Review of Systems is negative other than noted in the HPI or here.      Physical Exam   Vital Signs: Temp: 98.5  F (36.9  C) Temp src: Temporal BP: 95/62 Pulse: 77   Resp: 20 SpO2: 93 % O2 Device: Nasal cannula Oxygen Delivery: 4 LPM  Weight: 0 lbs 0 oz    Constitutional: Awake, alert, cooperative,lethargic with slow answers but they are accurate, TDC in R chest without signs of infection  Eyes: Conjunctiva and pupils examined and normal.  HEENT: dry mucous membranes, normal dentition.  Respiratory: Clear to auscultation bilaterally, no crackles or wheezing.  Cardiovascular: Regular rate and rhythm, normal S1 and S2, and no murmur noted, fistula murmur hear  GI: Soft, non-distended, non-tender, normal bowel sounds.  Skin: No rashes, no cyanosis, no edema.  Musculoskeletal: No joint swelling, erythema or tenderness.  Neurologic: Cranial nerves 2-12 intact, normal strength and sensation.  Ext: LUE with healing incision, fistula with thrill      Medical Decision Making       75 MINUTES SPENT BY ME on the date of service doing chart review, history, exam, documentation & further activities per the note.      Data     I have personally reviewed the following data over the past 24 hrs:    9.2  \   7.6 (L)   / 105 (L)     132 (L) 91 (L) 25.4 (H) /  192 (H)   4.2 26 5.64 (H) \     ALT: 54 AST: 147 (H) AP: 208 (H) TBILI: 1.0   ALB: 4.0 TOT PROTEIN: 7.1 LIPASE: N/A     Trop: N/A BNP: 18,384 (H)     Procal: N/A CRP: N/A Lactic Acid: 1.4       INR:  1.47 (H) PTT:  N/A   D-dimer:  N/A Fibrinogen:  N/A       Imaging results reviewed over the past 24 hrs:   Recent Results (from the past 24 hours)   XR Chest 2 Views    Narrative    EXAM: XR CHEST 2 VIEWS  LOCATION: [facility]  DATE: 2025    INDICATION:  Shortness of breath  COMPARISON: Chest radiograph 6/22/2025      Impression    IMPRESSION: Cardiac silhouette is enlarged, not significantly changed from prior exam. Tunneled dialysis catheter with tip overlying the right atrium. There is pulmonary vascular congestion with small bilateral pleural effusions, right greater than left.   No lobar airspace consolidation or pneumothorax. No acute bony abnormality.   CT Head w/o Contrast    Narrative    EXAM: CT HEAD W/O CONTRAST  LOCATION: [facility]  DATE: 6/26/2025    INDICATION: ams  COMPARISON: 11/3/2024 head CT.  TECHNIQUE: Routine CT Head without IV contrast. Multiplanar reformats. Dose reduction techniques were used.    FINDINGS:  INTRACRANIAL CONTENTS: No intracranial hemorrhage, extraaxial collection, or mass effect.  No CT evidence of acute infarct. Mild presumed chronic small vessel ischemic changes. Mild generalized volume loss. No hydrocephalus.     VISUALIZED ORBITS/SINUSES/MASTOIDS: No intraorbital abnormality. No paranasal sinus mucosal disease. No middle ear or mastoid effusion.    BONES/SOFT TISSUES: No acute abnormality.      Impression    IMPRESSION:  1.  Age-related changes as above with no acute intracranial abnormality.

## 2025-06-26 NOTE — PROGRESS NOTES
Potassium   Date Value Ref Range Status   06/26/2025 4.2 3.4 - 5.3 mmol/L Final   12/29/2022 3.4 3.4 - 5.3 mmol/L Final   04/20/2020 3.9 3.4 - 5.3 mmol/L Final     Potassium POCT   Date Value Ref Range Status   10/05/2023 3.6 3.4 - 5.3 mmol/L Final     Hemoglobin   Date Value Ref Range Status   06/26/2025 7.6 (L) 13.3 - 17.7 g/dL Final   04/20/2020 14.3 13.3 - 17.7 g/dL Final     Creatinine   Date Value Ref Range Status   06/26/2025 5.64 (H) 0.67 - 1.17 mg/dL Final   04/20/2020 1.06 0.66 - 1.25 mg/dL Final     Urea Nitrogen   Date Value Ref Range Status   06/26/2025 25.4 (H) 8.0 - 23.0 mg/dL Final   12/29/2022 46 (H) 7 - 30 mg/dL Final   04/20/2020 23 7 - 30 mg/dL Final     Sodium   Date Value Ref Range Status   06/26/2025 132 (L) 135 - 145 mmol/L Final   04/20/2020 139 133 - 144 mmol/L Final     INR   Date Value Ref Range Status   06/26/2025 1.47 (H) 0.85 - 1.15 Final   10/07/2019 1.20 (H) 0.86 - 1.14 Final       DIALYSIS PROCEDURE NOTE  Hepatitis status of previous patient on machine log was checked and verified ok to use with this patients hepatitis status.  Patient dialyzed for 2 hrs. UF only with a net fluid removal of  1.5L.  A BFR of 300 ml/min was obtained via a RCVC.      The treatment plan was discussed with Dr. Reilly during the treatment.    Total heparin received during the treatment: 0 units.     Line flushed, clamped and capped with heparin 1:1000 1.9 mL (1900 units) per lumen    Meds given: Albumin 5% prime, Midodrine 30 mg, Midodrine 10 mg   Complications: Hypotensive episodes along with SBP in the 70's. Dr. Reilly notified and gave verbal order to administer an additional 10 mg of midodrine. 200 mL NS bolus given post HD x2 for hypertension.     Person educated: pt. Knowledge base JALYN. Barriers to learning: confusion/disoriented. Educated on procedure via verbal mode. The patient verbalized understanding.   ICEBOAT? Timeout performed pre-treatment  I: Patient was identified using 2  identifiers  C:  Consent Signed Yes  E: Equipment preventative maintenance is current and dialysis delivery system OK to use  B: Hepatitis B Surface Antigen: non-reactive; Draw Date: 6/22/25      Hepatitis B Surface Antibody: Susceptible; Draw Date: 6/22/25  O: Dialysis orders present and complete prior to treatment  A: Vascular access verified and assessed prior to treatment  T: Treatment was performed at a clinically appropriate time  ?: Patient was allowed to ask questions and address concerns prior to treatment  [facility] for further details and post assessment.  Machine water alarm in place and functioning. Transducer pods intact and checked every 15min.   Pt assisted with repositioning throughout dialysis treatment.  Pt returned via cart.  Chlorine/Chloramine water system checked every 4 hours.  Outpatient Dialysis at [facility] on MW.      Post treatment report given to ED RN regarding 1.5L of fluid removed, last BP 81/50. See HD flowsheet for all data.    [facility] RN    Yasmeen Noel RN on 6/26/2025 at 7:20 PM

## 2025-06-26 NOTE — PHARMACY-ADMISSION MEDICATION HISTORY
Pharmacist Admission Medication History    Admission medication history is complete. The information provided in this note is only as accurate as the sources available at the time of the update.    Information Source(s): Patient, Hospital records, and CareEverywhere/SureScripts via in-person    Pertinent Information: The patient was quite confused during my interview and most of the updates were made with the help of family members in the room.  Patient was just here recently but I made several updates to medication list. There are multiple meds from previous list that the patient no longer takes. He also reported taking PRN famotidine and TUMS PRN. I advised him not to take famotidine due to being on dialysis and the fact that it is renally cleared. I spoke with him and family regarding midodrine and it sounds like the patient is not on a stringent schedule and takes it when he feels his BP is low. He does take midodrine with dialysis and it appears that how the Rx is written on dialysis days aligns with how he actually takes the medication. Unsure when patient last took warfarin, INR is 1.47, he reports taking 2 mg and INR clinic has much higher dosing, unsure patients actual dosing. Also unclear if the patient actually takes Aspirin. Difficult med history, completed to the best of my ability.     Changes made to PTA medication list:  Added: Tums, pepcid  Deleted: atarax, lacosamide, renvela, trazodone  Changed: gabapentin to prn, melatonin to prn    Allergies reviewed with patient and updates made in EHR: no    Medication History Completed By: COLLIN CARRILLO RPH 6/26/2025 3:22 PM    PTA Med List   Medication Sig Note Last Dose/Taking    acetaminophen (TYLENOL) 325 MG tablet Take 2 tablets (650 mg) by mouth every 8 hours as needed for other (For optimal non-opioid multimodal pain management to improve pain control.)  Past Week    aspirin 81 MG EC tablet Take 1 tablet (81 mg) by mouth daily  Unknown    calcium  carbonate (TUMS) 500 MG chewable tablet Take 1 chew tab by mouth 4 times daily as needed for heartburn.  Past Month    famotidine (PEPCID) 20 MG tablet Take 20 mg by mouth 2 times daily as needed.  6/26/2025 Morning    gabapentin (NEURONTIN) 100 MG capsule Take 300 mg by mouth nightly as needed for other (RLS).  Unknown    melatonin 3 MG tablet Take 3 mg by mouth nightly as needed for sleep.  Unknown    midodrine (PROAMATINE) 10 MG tablet TAKE 1 TABLET 3X DAILY. ON DAILYSIS DAYS(M,W,F) TAKE 2 EXTRA TABLETS 1 HOUR BEFORE,1 TABLET WHEN YOU ARRIVE, AND 1 TABLET DURING DIALYSIS.  6/26/2025 Morning    multivitamin RENAL (TRIPHROCAPS) 1 capsule capsule TAKE 1 CAPSULE BY MOUTH DAILY  6/26/2025 Morning    omeprazole (PRILOSEC) 40 MG DR capsule TAKE 1 CAPSULE(40 MG) BY MOUTH DAILY 6/26/2025: Uses prn Unknown    oxyCODONE (ROXICODONE) 5 MG tablet Take 1 tablet (5 mg) by mouth every 6 hours as needed for moderate pain.  6/26/2025 Morning    tacrolimus (GENERIC EQUIVALENT) 1 MG capsule TAKE 1 CAPSULE BY MOUTH EVERY 12 HOURS  6/26/2025 Morning    warfarin ANTICOAGULANT (COUMADIN) 2 MG tablet 8 mg Mon, Fri; 6 mg all other days or as directed by INR clinic 6/26/2025: Unsure when he took last, was on hold for procedure  Taking

## 2025-06-27 ENCOUNTER — ANCILLARY PROCEDURE (OUTPATIENT)
Dept: ULTRASOUND IMAGING | Facility: CLINIC | Age: 60
End: 2025-06-27
Payer: COMMERCIAL

## 2025-06-27 ENCOUNTER — APPOINTMENT (OUTPATIENT)
Dept: CARDIOLOGY | Facility: CLINIC | Age: 60
End: 2025-06-27
Payer: COMMERCIAL

## 2025-06-27 ENCOUNTER — HOSPITAL ENCOUNTER (INPATIENT)
Facility: CLINIC | Age: 60
End: 2025-06-27
Attending: INTERNAL MEDICINE | Admitting: INTERNAL MEDICINE
Payer: COMMERCIAL

## 2025-06-27 ENCOUNTER — APPOINTMENT (OUTPATIENT)
Dept: OCCUPATIONAL THERAPY | Facility: CLINIC | Age: 60
DRG: 175 | End: 2025-06-27
Payer: COMMERCIAL

## 2025-06-27 ENCOUNTER — DOCUMENTATION ONLY (OUTPATIENT)
Dept: TRANSPLANT | Facility: CLINIC | Age: 60
End: 2025-06-27
Payer: COMMERCIAL

## 2025-06-27 ENCOUNTER — APPOINTMENT (OUTPATIENT)
Dept: CT IMAGING | Facility: CLINIC | Age: 60
DRG: 175 | End: 2025-06-27
Payer: COMMERCIAL

## 2025-06-27 VITALS
DIASTOLIC BLOOD PRESSURE: 65 MMHG | OXYGEN SATURATION: 97 % | SYSTOLIC BLOOD PRESSURE: 98 MMHG | HEART RATE: 64 BPM | RESPIRATION RATE: 11 BRPM | TEMPERATURE: 98 F

## 2025-06-27 DIAGNOSIS — I26.99 ACUTE PULMONARY EMBOLISM, UNSPECIFIED PULMONARY EMBOLISM TYPE, UNSPECIFIED WHETHER ACUTE COR PULMONALE PRESENT (H): ICD-10-CM

## 2025-06-27 DIAGNOSIS — K76.0 NAFLD (NONALCOHOLIC FATTY LIVER DISEASE): ICD-10-CM

## 2025-06-27 DIAGNOSIS — R56.9 SEIZURES (H): ICD-10-CM

## 2025-06-27 DIAGNOSIS — T82.590A MALFUNCTION OF ARTERIOVENOUS DIALYSIS FISTULA, INITIAL ENCOUNTER: ICD-10-CM

## 2025-06-27 DIAGNOSIS — G25.81 RESTLESS LEGS SYNDROME (RLS): ICD-10-CM

## 2025-06-27 DIAGNOSIS — N18.6 ESRD (END STAGE RENAL DISEASE) ON DIALYSIS (H): Primary | ICD-10-CM

## 2025-06-27 DIAGNOSIS — Z94.4 LIVER TRANSPLANTED (H): ICD-10-CM

## 2025-06-27 DIAGNOSIS — Z99.2 ESRD (END STAGE RENAL DISEASE) ON DIALYSIS (H): Primary | ICD-10-CM

## 2025-06-27 LAB
ALBUMIN BODY FLUID SOURCE: NORMAL
ALBUMIN FLD-MCNC: 2.5 G/DL
ALBUMIN SERPL BCG-MCNC: 4 G/DL (ref 3.5–5.2)
ALP SERPL-CCNC: 179 U/L (ref 40–150)
ALT SERPL W P-5'-P-CCNC: 37 U/L (ref 0–70)
ANION GAP SERPL CALCULATED.3IONS-SCNC: 18 MMOL/L (ref 7–15)
APPEARANCE FLD: ABNORMAL
AST SERPL W P-5'-P-CCNC: 113 U/L (ref 0–45)
BILIRUB SERPL-MCNC: 0.8 MG/DL
BILIRUBIN DIRECT (ROCHE PRO & PURE): 0.53 MG/DL (ref 0–0.45)
BUN SERPL-MCNC: 29.9 MG/DL (ref 8–23)
CALCIUM SERPL-MCNC: 8.9 MG/DL (ref 8.8–10.4)
CHLORIDE SERPL-SCNC: 92 MMOL/L (ref 98–107)
COLOR FLD: ABNORMAL
CREAT SERPL-MCNC: 6.15 MG/DL (ref 0.67–1.17)
EGFRCR SERPLBLD CKD-EPI 2021: 10 ML/MIN/1.73M2
EOSINOPHIL NFR FLD MANUAL: 5 %
ERYTHROCYTE [DISTWIDTH] IN BLOOD BY AUTOMATED COUNT: 15.9 % (ref 10–15)
ERYTHROCYTE [DISTWIDTH] IN BLOOD BY AUTOMATED COUNT: 16.1 % (ref 10–15)
ETHANOL SERPL-MCNC: <0.01 G/DL
FOLATE SERPL-MCNC: 13.3 NG/ML (ref 4.6–34.8)
GLUCOSE BLDC GLUCOMTR-MCNC: 113 MG/DL (ref 70–99)
GLUCOSE BLDC GLUCOMTR-MCNC: 120 MG/DL (ref 70–99)
GLUCOSE SERPL-MCNC: 124 MG/DL (ref 70–99)
HCO3 SERPL-SCNC: 20 MMOL/L (ref 22–29)
HCT VFR BLD AUTO: 22 % (ref 40–53)
HCT VFR BLD AUTO: 23.7 % (ref 40–53)
HGB BLD-MCNC: 6.9 G/DL (ref 13.3–17.7)
HGB BLD-MCNC: 7.4 G/DL (ref 13.3–17.7)
INR PPP: 1.47 (ref 0.85–1.15)
LVEF ECHO: NORMAL
LYMPHOCYTES NFR FLD MANUAL: 17 %
MCH RBC QN AUTO: 34 PG (ref 26.5–33)
MCH RBC QN AUTO: 34.1 PG (ref 26.5–33)
MCHC RBC AUTO-ENTMCNC: 31.2 G/DL (ref 31.5–36.5)
MCHC RBC AUTO-ENTMCNC: 31.4 G/DL (ref 31.5–36.5)
MCV RBC AUTO: 108 FL (ref 78–100)
MCV RBC AUTO: 109 FL (ref 78–100)
MONOS+MACROS NFR FLD MANUAL: 0 %
MRSA DNA SPEC QL NAA+PROBE: NEGATIVE
NEUTROPHILS # FLD: 73.2 /UL (ref ?–250)
NEUTS BAND NFR FLD MANUAL: 12 %
OTHER CELLS FLD MANUAL: 66 %
PHOSPHATE SERPL-MCNC: 4.8 MG/DL (ref 2.5–4.5)
PLATELET # BLD AUTO: 110 10E3/UL (ref 150–450)
PLATELET # BLD AUTO: 97 10E3/UL (ref 150–450)
POTASSIUM SERPL-SCNC: 4 MMOL/L (ref 3.4–5.3)
PROT FLD-MCNC: 4.3 G/DL
PROT SERPL-MCNC: 7.1 G/DL (ref 6.4–8.3)
PROTEIN BODY FLUID SOURCE: NORMAL
PROTHROMBIN TIME: 17.7 SECONDS (ref 11.8–14.8)
RADIOLOGIST FLAGS: ABNORMAL
RBC # BLD AUTO: 2.03 10E6/UL (ref 4.4–5.9)
RBC # BLD AUTO: 2.17 10E6/UL (ref 4.4–5.9)
RETICS # AUTO: 0.12 10E6/UL (ref 0.03–0.1)
RETICS/RBC NFR AUTO: 5.7 % (ref 0.5–2)
SA TARGET DNA: NEGATIVE
SODIUM SERPL-SCNC: 130 MMOL/L (ref 135–145)
SPECIMEN SOURCE FLD: ABNORMAL
TACROLIMUS BLD-MCNC: 7.6 UG/L (ref 5–15)
TME LAST DOSE: NORMAL H
TME LAST DOSE: NORMAL H
TSH SERPL DL<=0.005 MIU/L-ACNC: 1.74 UIU/ML (ref 0.3–4.2)
UFH PPP CHRO-ACNC: <0.1 IU/ML (ref ?–1.1)
VIT B12 SERPL-MCNC: 1556 PG/ML (ref 232–1245)
WBC # BLD AUTO: 7.5 10E3/UL (ref 4–11)
WBC # BLD AUTO: 9.2 10E3/UL (ref 4–11)
WBC # FLD AUTO: 610 /UL

## 2025-06-27 PROCEDURE — 250N000013 HC RX MED GY IP 250 OP 250 PS 637

## 2025-06-27 PROCEDURE — 84100 ASSAY OF PHOSPHORUS: CPT | Performed by: PHYSICIAN ASSISTANT

## 2025-06-27 PROCEDURE — 99292 CRITICAL CARE ADDL 30 MIN: CPT | Mod: GC | Performed by: INTERNAL MEDICINE

## 2025-06-27 PROCEDURE — 82077 ASSAY SPEC XCP UR&BREATH IA: CPT

## 2025-06-27 PROCEDURE — 36415 COLL VENOUS BLD VENIPUNCTURE: CPT

## 2025-06-27 PROCEDURE — 84157 ASSAY OF PROTEIN OTHER: CPT

## 2025-06-27 PROCEDURE — 250N000011 HC RX IP 250 OP 636

## 2025-06-27 PROCEDURE — 93306 TTE W/DOPPLER COMPLETE: CPT

## 2025-06-27 PROCEDURE — 99207 PR NO BILLABLE SERVICE THIS VISIT: CPT | Performed by: INTERNAL MEDICINE

## 2025-06-27 PROCEDURE — 97530 THERAPEUTIC ACTIVITIES: CPT | Mod: GO

## 2025-06-27 PROCEDURE — 84155 ASSAY OF PROTEIN SERUM: CPT

## 2025-06-27 PROCEDURE — 93306 TTE W/DOPPLER COMPLETE: CPT | Mod: 26 | Performed by: INTERNAL MEDICINE

## 2025-06-27 PROCEDURE — 97165 OT EVAL LOW COMPLEX 30 MIN: CPT | Mod: GO

## 2025-06-27 PROCEDURE — 80048 BASIC METABOLIC PNL TOTAL CA: CPT

## 2025-06-27 PROCEDURE — 200N000002 HC R&B ICU UMMC

## 2025-06-27 PROCEDURE — 85610 PROTHROMBIN TIME: CPT

## 2025-06-27 PROCEDURE — 250N000011 HC RX IP 250 OP 636: Mod: JZ | Performed by: PHYSICIAN ASSISTANT

## 2025-06-27 PROCEDURE — 258N000003 HC RX IP 258 OP 636: Performed by: PHYSICIAN ASSISTANT

## 2025-06-27 PROCEDURE — 85014 HEMATOCRIT: CPT

## 2025-06-27 PROCEDURE — 87641 MR-STAPH DNA AMP PROBE: CPT

## 2025-06-27 PROCEDURE — 71275 CT ANGIOGRAPHY CHEST: CPT | Mod: 26 | Performed by: STUDENT IN AN ORGANIZED HEALTH CARE EDUCATION/TRAINING PROGRAM

## 2025-06-27 PROCEDURE — 84443 ASSAY THYROID STIM HORMONE: CPT

## 2025-06-27 PROCEDURE — 82607 VITAMIN B-12: CPT

## 2025-06-27 PROCEDURE — 999N000147 HC STATISTIC PT IP EVAL DEFER

## 2025-06-27 PROCEDURE — 80197 ASSAY OF TACROLIMUS: CPT

## 2025-06-27 PROCEDURE — 999N000285 HC STATISTIC VASC ACCESS LAB DRAW WITH PIV START

## 2025-06-27 PROCEDURE — 250N000011 HC RX IP 250 OP 636: Performed by: INTERNAL MEDICINE

## 2025-06-27 PROCEDURE — P9045 ALBUMIN (HUMAN), 5%, 250 ML: HCPCS | Mod: JZ | Performed by: PHYSICIAN ASSISTANT

## 2025-06-27 PROCEDURE — 634N000001 HC RX 634: Mod: JZ | Performed by: PHYSICIAN ASSISTANT

## 2025-06-27 PROCEDURE — 89050 BODY FLUID CELL COUNT: CPT

## 2025-06-27 PROCEDURE — P9047 ALBUMIN (HUMAN), 25%, 50ML: HCPCS

## 2025-06-27 PROCEDURE — 82042 OTHER SOURCE ALBUMIN QUAN EA: CPT

## 2025-06-27 PROCEDURE — 250N000009 HC RX 250

## 2025-06-27 PROCEDURE — 999N000127 HC STATISTIC PERIPHERAL IV START W US GUIDANCE

## 2025-06-27 PROCEDURE — 87070 CULTURE OTHR SPECIMN AEROBIC: CPT

## 2025-06-27 PROCEDURE — 71275 CT ANGIOGRAPHY CHEST: CPT

## 2025-06-27 PROCEDURE — 250N000011 HC RX IP 250 OP 636: Performed by: STUDENT IN AN ORGANIZED HEALTH CARE EDUCATION/TRAINING PROGRAM

## 2025-06-27 PROCEDURE — 250N000012 HC RX MED GY IP 250 OP 636 PS 637

## 2025-06-27 PROCEDURE — 99207 PR NON-BILLABLE SERV PER CHARTING: CPT | Performed by: PHYSICIAN ASSISTANT

## 2025-06-27 PROCEDURE — 999N000248 HC STATISTIC IV INSERT WITH US BY RN

## 2025-06-27 PROCEDURE — 99291 CRITICAL CARE FIRST HOUR: CPT | Mod: GC | Performed by: INTERNAL MEDICINE

## 2025-06-27 PROCEDURE — 82746 ASSAY OF FOLIC ACID SERUM: CPT

## 2025-06-27 PROCEDURE — 99255 IP/OBS CONSLTJ NEW/EST HI 80: CPT | Mod: GC | Performed by: INTERNAL MEDICINE

## 2025-06-27 PROCEDURE — 0W9G3ZZ DRAINAGE OF PERITONEAL CAVITY, PERCUTANEOUS APPROACH: ICD-10-PCS | Performed by: STUDENT IN AN ORGANIZED HEALTH CARE EDUCATION/TRAINING PROGRAM

## 2025-06-27 PROCEDURE — 49083 ABD PARACENTESIS W/IMAGING: CPT | Performed by: STUDENT IN AN ORGANIZED HEALTH CARE EDUCATION/TRAINING PROGRAM

## 2025-06-27 PROCEDURE — 80235 DRUG ASSAY LACOSAMIDE: CPT

## 2025-06-27 PROCEDURE — 3E043XZ INTRODUCTION OF VASOPRESSOR INTO CENTRAL VEIN, PERCUTANEOUS APPROACH: ICD-10-PCS | Performed by: STUDENT IN AN ORGANIZED HEALTH CARE EDUCATION/TRAINING PROGRAM

## 2025-06-27 PROCEDURE — 5A1D70Z PERFORMANCE OF URINARY FILTRATION, INTERMITTENT, LESS THAN 6 HOURS PER DAY: ICD-10-PCS | Performed by: STUDENT IN AN ORGANIZED HEALTH CARE EDUCATION/TRAINING PROGRAM

## 2025-06-27 PROCEDURE — 258N000003 HC RX IP 258 OP 636: Performed by: INTERNAL MEDICINE

## 2025-06-27 PROCEDURE — 85520 HEPARIN ASSAY: CPT

## 2025-06-27 PROCEDURE — 85045 AUTOMATED RETICULOCYTE COUNT: CPT

## 2025-06-27 PROCEDURE — 99254 IP/OBS CNSLTJ NEW/EST MOD 60: CPT | Mod: 25 | Performed by: PHYSICIAN ASSISTANT

## 2025-06-27 PROCEDURE — 90937 HEMODIALYSIS REPEATED EVAL: CPT

## 2025-06-27 RX ORDER — ONDANSETRON 4 MG/1
4 TABLET, ORALLY DISINTEGRATING ORAL EVERY 6 HOURS PRN
Status: DISPENSED | OUTPATIENT
Start: 2025-06-27

## 2025-06-27 RX ORDER — NALOXONE HYDROCHLORIDE 0.4 MG/ML
0.2 INJECTION, SOLUTION INTRAMUSCULAR; INTRAVENOUS; SUBCUTANEOUS
Status: ACTIVE | OUTPATIENT
Start: 2025-06-27

## 2025-06-27 RX ORDER — ACETAMINOPHEN 325 MG/1
650 TABLET ORAL EVERY 8 HOURS PRN
Status: DISPENSED | OUTPATIENT
Start: 2025-06-27

## 2025-06-27 RX ORDER — MIDODRINE HYDROCHLORIDE 5 MG/1
20 TABLET ORAL
Status: DISCONTINUED | OUTPATIENT
Start: 2025-06-27 | End: 2025-06-27

## 2025-06-27 RX ORDER — MIDODRINE HYDROCHLORIDE 5 MG/1
10 TABLET ORAL
Status: DISCONTINUED | OUTPATIENT
Start: 2025-06-27 | End: 2025-06-27

## 2025-06-27 RX ORDER — LACOSAMIDE 50 MG/1
50 TABLET ORAL DAILY
Status: DISPENSED | OUTPATIENT
Start: 2025-06-27

## 2025-06-27 RX ORDER — NICOTINE POLACRILEX 4 MG
15-30 LOZENGE BUCCAL
Status: ACTIVE | OUTPATIENT
Start: 2025-06-27

## 2025-06-27 RX ORDER — GABAPENTIN 300 MG/1
300 CAPSULE ORAL
Status: DISPENSED | OUTPATIENT
Start: 2025-06-27

## 2025-06-27 RX ORDER — TACROLIMUS 1 MG/1
1 CAPSULE ORAL EVERY 12 HOURS
Status: DISCONTINUED | OUTPATIENT
Start: 2025-06-27 | End: 2025-07-01

## 2025-06-27 RX ORDER — ONDANSETRON 2 MG/ML
4 INJECTION INTRAMUSCULAR; INTRAVENOUS EVERY 6 HOURS PRN
Status: DISPENSED | OUTPATIENT
Start: 2025-06-27

## 2025-06-27 RX ORDER — AMOXICILLIN 250 MG
1 CAPSULE ORAL 2 TIMES DAILY PRN
Status: ACTIVE | OUTPATIENT
Start: 2025-06-27

## 2025-06-27 RX ORDER — MIDODRINE HYDROCHLORIDE 5 MG/1
20 TABLET ORAL EVERY 8 HOURS
Status: DISPENSED | OUTPATIENT
Start: 2025-06-27

## 2025-06-27 RX ORDER — ROPIVACAINE IN 0.9% SOD CHL/PF 0.1 %
.01-.2 PLASTIC BAG, INJECTION (ML) EPIDURAL CONTINUOUS
Status: DISCONTINUED | OUTPATIENT
Start: 2025-06-27 | End: 2025-06-30

## 2025-06-27 RX ORDER — HEPARIN SODIUM 10000 [USP'U]/100ML
0-5000 INJECTION, SOLUTION INTRAVENOUS CONTINUOUS
Status: DISPENSED | OUTPATIENT
Start: 2025-06-27

## 2025-06-27 RX ORDER — NALOXONE HYDROCHLORIDE 0.4 MG/ML
0.4 INJECTION, SOLUTION INTRAMUSCULAR; INTRAVENOUS; SUBCUTANEOUS
Status: ACTIVE | OUTPATIENT
Start: 2025-06-27

## 2025-06-27 RX ORDER — ALBUMIN (HUMAN) 12.5 G/50ML
25 SOLUTION INTRAVENOUS
Status: COMPLETED | OUTPATIENT
Start: 2025-06-27 | End: 2025-06-27

## 2025-06-27 RX ORDER — ROPINIROLE 0.5 MG/1
0.5 TABLET, FILM COATED ORAL 2 TIMES DAILY
Status: DISPENSED | OUTPATIENT
Start: 2025-06-27

## 2025-06-27 RX ORDER — NOREPINEPHRINE BITARTRATE 0.06 MG/ML
.01-.6 INJECTION, SOLUTION INTRAVENOUS CONTINUOUS
Status: DISCONTINUED | OUTPATIENT
Start: 2025-06-27 | End: 2025-06-27

## 2025-06-27 RX ORDER — IOPAMIDOL 755 MG/ML
68 INJECTION, SOLUTION INTRAVASCULAR ONCE
Status: COMPLETED | OUTPATIENT
Start: 2025-06-27 | End: 2025-06-27

## 2025-06-27 RX ORDER — DEXTROSE MONOHYDRATE 25 G/50ML
25-50 INJECTION, SOLUTION INTRAVENOUS
Status: ACTIVE | OUTPATIENT
Start: 2025-06-27

## 2025-06-27 RX ORDER — AMOXICILLIN 250 MG
2 CAPSULE ORAL 2 TIMES DAILY PRN
Status: ACTIVE | OUTPATIENT
Start: 2025-06-27

## 2025-06-27 RX ORDER — OXYCODONE HYDROCHLORIDE 5 MG/1
5 TABLET ORAL EVERY 6 HOURS PRN
Refills: 0 | Status: DISCONTINUED | OUTPATIENT
Start: 2025-06-27 | End: 2025-06-28

## 2025-06-27 RX ORDER — CALCIUM CARBONATE 500 MG/1
500 TABLET, CHEWABLE ORAL 4 TIMES DAILY PRN
Status: ACTIVE | OUTPATIENT
Start: 2025-06-27

## 2025-06-27 RX ORDER — ASPIRIN 81 MG/1
81 TABLET, CHEWABLE ORAL DAILY
Status: DISPENSED | OUTPATIENT
Start: 2025-06-27

## 2025-06-27 RX ORDER — ALBUMIN (HUMAN) 12.5 G/50ML
25 SOLUTION INTRAVENOUS ONCE
Status: COMPLETED | OUTPATIENT
Start: 2025-06-27 | End: 2025-06-27

## 2025-06-27 RX ORDER — HEPARIN SODIUM 10000 [USP'U]/100ML
0-5000 INJECTION, SOLUTION INTRAVENOUS CONTINUOUS
Status: DISCONTINUED | OUTPATIENT
Start: 2025-06-27 | End: 2025-06-27

## 2025-06-27 RX ORDER — PIPERACILLIN SODIUM, TAZOBACTAM SODIUM 2; .25 G/10ML; G/10ML
2.25 INJECTION, POWDER, LYOPHILIZED, FOR SOLUTION INTRAVENOUS EVERY 8 HOURS
Status: DISCONTINUED | OUTPATIENT
Start: 2025-06-27 | End: 2025-06-27

## 2025-06-27 RX ORDER — PIPERACILLIN SODIUM, TAZOBACTAM SODIUM 2; .25 G/10ML; G/10ML
2.25 INJECTION, POWDER, LYOPHILIZED, FOR SOLUTION INTRAVENOUS EVERY 6 HOURS
Status: DISCONTINUED | OUTPATIENT
Start: 2025-06-27 | End: 2025-06-30

## 2025-06-27 RX ORDER — ALBUMIN (HUMAN) 12.5 G/50ML
50 SOLUTION INTRAVENOUS
Status: DISCONTINUED | OUTPATIENT
Start: 2025-06-27 | End: 2025-06-27

## 2025-06-27 RX ORDER — POLYETHYLENE GLYCOL 3350 17 G/17G
17 POWDER, FOR SOLUTION ORAL DAILY PRN
Status: ACTIVE | OUTPATIENT
Start: 2025-06-27

## 2025-06-27 RX ORDER — PANTOPRAZOLE SODIUM 40 MG/1
40 TABLET, DELAYED RELEASE ORAL 2 TIMES DAILY
Status: DISPENSED | OUTPATIENT
Start: 2025-06-27

## 2025-06-27 RX ADMIN — HEPARIN SODIUM 1700 UNITS/HR: 10000 INJECTION, SOLUTION INTRAVENOUS at 07:06

## 2025-06-27 RX ADMIN — PIPERACILLIN AND TAZOBACTAM 2.25 G: 2; .25 INJECTION, POWDER, FOR SOLUTION INTRAVENOUS at 05:06

## 2025-06-27 RX ADMIN — HEPARIN SODIUM 2100 UNITS/HR: 10000 INJECTION, SOLUTION INTRAVENOUS at 15:30

## 2025-06-27 RX ADMIN — HEPARIN SODIUM 1700 UNITS/HR: 10000 INJECTION, SOLUTION INTRAVENOUS at 08:28

## 2025-06-27 RX ADMIN — PANTOPRAZOLE SODIUM 40 MG: 40 TABLET, DELAYED RELEASE ORAL at 08:28

## 2025-06-27 RX ADMIN — TACROLIMUS 1 MG: 1 CAPSULE ORAL at 20:33

## 2025-06-27 RX ADMIN — ROPINIROLE HYDROCHLORIDE 0.5 MG: 0.5 TABLET, FILM COATED ORAL at 08:30

## 2025-06-27 RX ADMIN — MIDODRINE HYDROCHLORIDE 20 MG: 5 TABLET ORAL at 08:28

## 2025-06-27 RX ADMIN — ALBUMIN HUMAN 25 G: 0.25 SOLUTION INTRAVENOUS at 03:33

## 2025-06-27 RX ADMIN — ACETAMINOPHEN 650 MG: 325 TABLET ORAL at 21:03

## 2025-06-27 RX ADMIN — ASPIRIN 81 MG CHEWABLE TABLET 81 MG: 81 TABLET CHEWABLE at 08:28

## 2025-06-27 RX ADMIN — HEPARIN SODIUM 1900 UNITS: 1000 INJECTION, SOLUTION INTRAVENOUS; SUBCUTANEOUS at 19:07

## 2025-06-27 RX ADMIN — PIPERACILLIN AND TAZOBACTAM 2.25 G: 2; .25 INJECTION, POWDER, FOR SOLUTION INTRAVENOUS at 11:34

## 2025-06-27 RX ADMIN — PIPERACILLIN AND TAZOBACTAM 2.25 G: 2; .25 INJECTION, POWDER, FOR SOLUTION INTRAVENOUS at 15:30

## 2025-06-27 RX ADMIN — LACOSAMIDE 50 MG: 50 TABLET, FILM COATED ORAL at 08:28

## 2025-06-27 RX ADMIN — CEFTRIAXONE SODIUM 2 G: 2 INJECTION, POWDER, FOR SOLUTION INTRAMUSCULAR; INTRAVENOUS at 00:28

## 2025-06-27 RX ADMIN — MIDODRINE HYDROCHLORIDE 20 MG: 5 TABLET ORAL at 23:31

## 2025-06-27 RX ADMIN — ONDANSETRON 4 MG: 2 INJECTION INTRAMUSCULAR; INTRAVENOUS at 15:37

## 2025-06-27 RX ADMIN — IOPAMIDOL 68 ML: 755 INJECTION, SOLUTION INTRAVENOUS at 06:21

## 2025-06-27 RX ADMIN — HEPARIN SODIUM 1100 UNITS/HR: 10000 INJECTION, SOLUTION INTRAVENOUS at 03:53

## 2025-06-27 RX ADMIN — VANCOMYCIN HYDROCHLORIDE 2250 MG: 500 INJECTION, POWDER, LYOPHILIZED, FOR SOLUTION INTRAVENOUS at 08:32

## 2025-06-27 RX ADMIN — MIDODRINE HYDROCHLORIDE 20 MG: 5 TABLET ORAL at 15:30

## 2025-06-27 RX ADMIN — ROPINIROLE HYDROCHLORIDE 0.5 MG: 0.5 TABLET, FILM COATED ORAL at 20:33

## 2025-06-27 RX ADMIN — NOREPINEPHRINE BITARTRATE 0.01 MCG/KG/MIN: 0.02 INJECTION, SOLUTION INTRAVENOUS at 23:29

## 2025-06-27 RX ADMIN — SODIUM CHLORIDE 200 ML: 0.9 INJECTION, SOLUTION INTRAVENOUS at 16:23

## 2025-06-27 RX ADMIN — HEPARIN SODIUM 2100 UNITS/HR: 10000 INJECTION, SOLUTION INTRAVENOUS at 23:14

## 2025-06-27 RX ADMIN — ALBUMIN HUMAN 25 G: 0.25 SOLUTION INTRAVENOUS at 16:58

## 2025-06-27 RX ADMIN — PANTOPRAZOLE SODIUM 40 MG: 40 TABLET, DELAYED RELEASE ORAL at 20:33

## 2025-06-27 RX ADMIN — ALBUMIN HUMAN 250 ML: 0.05 INJECTION, SOLUTION INTRAVENOUS at 16:22

## 2025-06-27 RX ADMIN — HEPARIN SODIUM 2100 UNITS/HR: 10000 INJECTION, SOLUTION INTRAVENOUS at 16:58

## 2025-06-27 RX ADMIN — EPOETIN ALFA-EPBX 4000 UNITS: 10000 INJECTION, SOLUTION INTRAVENOUS; SUBCUTANEOUS at 16:36

## 2025-06-27 RX ADMIN — ONDANSETRON 4 MG: 2 INJECTION INTRAMUSCULAR; INTRAVENOUS at 08:28

## 2025-06-27 RX ADMIN — TACROLIMUS 1 MG: 1 CAPSULE ORAL at 08:28

## 2025-06-27 RX ADMIN — PIPERACILLIN AND TAZOBACTAM 2.25 G: 2; .25 INJECTION, POWDER, FOR SOLUTION INTRAVENOUS at 23:17

## 2025-06-27 RX ADMIN — NOREPINEPHRINE BITARTRATE 0.03 MCG/KG/MIN: 0.02 INJECTION, SOLUTION INTRAVENOUS at 02:07

## 2025-06-27 ASSESSMENT — ACTIVITIES OF DAILY LIVING (ADL)
ADLS_ACUITY_SCORE: 65
ADLS_ACUITY_SCORE: 62
ADLS_ACUITY_SCORE: 62
ADLS_ACUITY_SCORE: 65
ADLS_ACUITY_SCORE: 62
ADLS_ACUITY_SCORE: 65
ADLS_ACUITY_SCORE: 63
ADLS_ACUITY_SCORE: 65
ADLS_ACUITY_SCORE: 62
ADLS_ACUITY_SCORE: 59
ADLS_ACUITY_SCORE: 62
ADLS_ACUITY_SCORE: 65
ADLS_ACUITY_SCORE: 62
ADLS_ACUITY_SCORE: 65

## 2025-06-27 NOTE — ED NOTES
Patient's wife called inquiring about the transfer of this patient. She explained that she missed a call from Dr. Greenfield last night. Writer confirmed that this patient was transferred to [facility] ICU last night due to capacity at [facility]. Ofe was thankful and appreciative of this information.    ...............................................  ED Charge Nurse

## 2025-06-27 NOTE — PHARMACY-VANCOMYCIN DOSING SERVICE
Pharmacy Vancomycin Initial Note  Date of Service 2025  Patient's  1965  60 year old, male    Indication: Sepsis, HAP, SBP    Current estimated CrCl = Estimated Creatinine Clearance: 16.6 mL/min (A) (based on SCr of 6.15 mg/dL (H)).    Creatinine for last 3 days  2025:  6:50 AM Creatinine 6.73 mg/dL  2025:  7:13 AM Creatinine 7.96 mg/dL  2025:  1:02 PM Creatinine 5.64 mg/dL  2025:  2:48 AM Creatinine 6.15 mg/dL    Recent Vancomycin Level(s) for last 3 days  No results found for requested labs within last 3 days.      Vancomycin IV Administrations (past 72 hours)        No vancomycin orders with administrations in past 72 hours.                    Nephrotoxins and other renal medications (From now, onward)      Start     Dose/Rate Route Frequency Ordered Stop    25 0800  tacrolimus (GENERIC EQUIVALENT) capsule 1 mg        Note to Pharmacy: PTA Sig:TAKE 1 CAPSULE BY MOUTH EVERY 12 HOURS      1 mg Oral EVERY 12 HOURS 25 0146      25 0400  piperacillin-tazobactam (ZOSYN) 2.25 g vial to attach to  ml bag         2.25 g  over 30 Minutes Intravenous EVERY 8 HOURS 25 0336      25 0200  norepinephrine (LEVOPHED) 4 mg in  mL PERIPHERAL infusion         0.01-0.2 mcg/kg/min × 93.5 kg  3.5-70.1 mL/hr  Intravenous CONTINUOUS 25 0159              Contrast Orders - past 72 hours (72h ago, onward)      Start     Dose/Rate Route Frequency Stop    25 0600  iopamidol (ISOVUE-370) solution 68 mL         68 mL Intravenous ONCE                  Plan:  Start vancomycin  2250 mg IV once, then follow levels.   Vancomycin monitoring method: Trough (Method 2 = manual dose calculation)  Vancomycin therapeutic monitoring goal: 15-20 mg/L  Pharmacy will check vancomycin levels as appropriate in 1-3 Days.    Serum creatinine levels will be ordered daily for the first week of therapy and at least twice weekly for subsequent weeks.      Kyle Webber [facility]

## 2025-06-27 NOTE — PROCEDURES
[facility]  CAPS PROCEDURE NOTE  Date of Admission:  6/27/2025  Consult Requested by: ICU  Reason for Consult: diagnostic evaluation of ascites and management of symptomatic ascites    Indication/HPI: Abdo distension     Pre-Procedure Diagnosis: Ascites    Post-Procedure Diagnosis: Ascites    Risk Assessment: Higher risk on heparin gtt and ASA. Blood vessels identified and stayed away from them     Procedure Outcome:  Diagnostic paracentesis performed and Therapeutic paracentesis performed with 3.1 liters of ascites removed.   See additional procedure details below.    The primary covering service should follow up and address any lab results as appropriate.    GREGORIO MALONE MD  [facility]  Securely message with [email or web address] (more info)  Text page via [facility] Paging/Directory   See signed in provider for up to date coverage information      [facility]    Paracentesis    Date/Time: 6/27/2025 1:04 PM    Performed by: Gregorio Malone MD  Authorized by: Gregorio Malone MD    PRE-PROCEDURE DETAILS  Initial or subsequent exam: initial  Procedure purpose: diagnostic and therapeutic  Indications: abdominal discomfort secondary to ascites        UNIVERSAL PROTOCOL   Site Marked: Yes  Prior Images Obtained and Reviewed:  Yes  Required items: Required blood products, implants, devices and special equipment available    Patient identity confirmed:  Verbally with patient  NA - No sedation, light sedation, or local anesthesia  Confirmation Checklist:  Patient's identity using two indicators, relevant allergies, procedure was appropriate and matched the consent or emergent situation and correct equipment/implants were available  Time out: Immediately prior to the procedure a time out was called    Universal Protocol: the Joint Commission Universal Protocol was followed    Preparation: Patient was prepped and  draped in usual sterile fashion    ESBL (mL):  1     ANESTHESIA    Anesthesia:  Local infiltration  Local Anesthetic:  Lidocaine 1% without epinephrine  Anesthetic Total (mL):  10      SEDATION    Patient Sedated: No    POST PROCEDURE DETAILS  Needle gauge: 19.  Ultrasound guidance: yes  Puncture site: left lower quadrant  Fluid removed: 3100(ml)  Fluid appearance: serosanguinous  Dressing: Sterile Bandaid with Glue.        PROCEDURE    Patient Tolerance:  Patient tolerated the procedure well with no immediate complications  Length of time physician/provider present for 1:1 monitoring during sedation: 0        POC US GUIDE FOR PARACENTESIS     Impression  US Indication:Abdominal distension    Limited abdominal ultrasound was performed and demonstrated an adequate fluid collection on the left side of the abdomen.    Doppler of the skin demonstrated an area at this site without significant vasculature.  A paracentesis at this site was subsequently performed.    NICOLE MALONE MD

## 2025-06-27 NOTE — PROGRESS NOTES
Antimicrobial Stewardship Team Note    Antimicrobial Stewardship Program - A joint venture between [facility] and  Physicians to optimize antibiotic management.  NOT a formal consult - Restricted Antimicrobial Review     Patient: Blanco Osborne  MRN: [id number]  Allergies: Patient has no known allergies.    Brief Summary: Blanco Osborne is a 60 year old male with a history of ESRD (on HD MWF since 1/23), NAFLD s/p liver transplant (9/2016), PEA arrest 2/2 hypoxic respiratory failure (2022), pAF, CVA, HLD, GERD, CHRISTIAN on CPAP, restless leg syndrome, and seizures who presented to the ED at [facility] for hypoxia and AMS on 6/22 and transferred to [facility] 6/27 for persistent hypotension after HD on 6/26.    History of Present Illness: On presentation to [facility], the patient was afebrile and hemodynamically stable with a WBC of 4.1.  He reported missing the previous dialysis session due to a non-functioning fistula and was short of breath which was thought to be related to volume overload.  The next day, the patient was dialyzed with removal of 3L with no reports of SOB after the run.  Of note, the patient did receive midodrine but this is a home regimen where the patient requires midodrine 10mg three times daily AND 4 additional tablets around dialysis sessions.  On 6/26, the patient had dialysis and 1.5L were removed.  During and after this run, the patient was hypotensive and ultimately placed on norepinephrine and transferred to [facility] due to bed availability.  On admission, the patient was afebrile, hypotensive on norepinephrine, and on a 3-4L of supplemental oxygen.  Pertinent labs include a WBC of 9.2 and a lactic acid of 1.4.  The patient denied cough.  Due to concern for shock due to infection (either HAP or SBP), the patient was started on ceftriaxone which was soon changed to piperacillin/tazobactam and vancomycin.  Blood cultures were collected prior to antibiotic start which are so far no  growth to date and MRSA nares is negative.  A chest x-ray showed no lobar airspace consolidation or pneumothorax and a CT of the abdomen/pelvis showed no acute process in the abdomen or pelvis, moderate to large ascites, and no hydronephrosis.  Since arrival, the patient received albumin and a higher dose of midododrine and their pressor requirements have subsequently decreased and norepinephrine was titrated off around 0900.  A CT chest now shows PE.           Active Anti-infective Medications   (From admission, onward)                 Start     Stop    06/27/25 1000  piperacillin-tazobactam  2.25 g,   Intravenous,   EVERY 6 HOURS        Sepsis   HAP and SBP       --    06/27/25 0636  Vancomycin [facility] - Receiving Intermittent Dosing  1 each,   Does not apply,   SEE ADMIN INSTRUCTIONS        Hospital-Acquired Pneumonia, Sepsis   SBP       --                  Assessment: c/f shock 2/2 HAP or SBP  Based on this patient's lack of symptoms such as cough or sputum production combined with chest imaging not concerning for a pneumonia, it is unlikely this patient has a respiratory infection.  Furthermore, new diagnosis of PE could be contributing to the patient's low level hypoxia.  With a negative MRSA nares, vancomycin can be discontinued.    With large volume ascites on CT imaging, it is possible this patient has SBP which could warrant empiric antibiotic coverage while waiting for paracentesis, however, we feel this is less likely due to lack of systemic signs of infection such as fever or leukocytosis.  Additionally, the patient has a chronic history of ascites post-transplant so this is not an acute picture.  We feel the persistent hypotension requiring pressors is likely due to distributive shock from fluid shifting associated with the dialysis run on 6/26.  The patient is already off pressors with albumin and a higher dose of midodrine which the patient has historically required large doses at baseline on  dialysis days.      We do not feel the patient needs paracentesis due to lack of signs and symptoms of infection as it is an invasive procedure and the patient has chronic ascites.  If pursuing paracentesis, and wanting to empirically treat for SBP, we recommend discontinuing piperacillin/tazobactam and resuming ceftriaxone 2g IV q24h while awaiting paracentesis.  A 5 day course is typically sufficient for SBP treatment if a paracentesis is consistent with SBP.        Recommendations:  Discontinue vancomycin with negative MRSA nares  We strongly favor discontinuing piperacillin/tazobactam as we do not feel this patient has an infection.  If concern for SBP persists, discontinue piperacillin/tazobactam and start ceftriaxone 2g IV q24h while waiting for paracentesis results.  If paracentesis consistent with SBP, continue ceftriaxone for a total of 5 days of therapy  If paracentesis not consistent with SBP, discontinue ceftriaxone at that time    Pharmacy took the following actions: Called/paged provider, Electronic note created.    Discussed with ID Staff: MD Victoriano Kate, PharmD, BCIDP    Vital Signs/Clinical Features:  Vitals         06/25 0700  06/26 0659 06/26 0700  06/27 0659 06/27 0700  06/27 1141   Most Recent      Temp ( F)   98 -  98.2      98.1     98.1 (36.7) 06/27 0800    Pulse   65 -  74    70 -  74     74 06/27 0730    Resp   16 -  20    15 -  20     15 06/27 0730    BP   82/60 -  108/71    97/61 -  104/63     104/63 06/27 0730    SpO2 (%)   97 -  100      100     100 06/27 0730            Labs  Estimated Creatinine Clearance: 16.6 mL/min (A) (based on SCr of 6.15 mg/dL (H)).  Recent Labs   Lab Test 06/22/25 2004 06/23/25  0621 06/24/25  0650 06/25/25  0713 06/26/25  1302 06/27/25  0248   CR 10.37* 10.79* 6.73* 7.96* 5.64* 6.15*       Recent Labs   Lab Test 01/16/24  1253 02/22/24  0916 04/03/24  1156 04/27/24  1805 05/09/24  1127 11/03/24  2050 11/06/24  1407 03/18/25  0939  06/22/25 2004 06/24/25  0650 06/25/25  0713 06/26/25  1302 06/27/25  0248 06/27/25  0344   WBC 4.3   < > 4.8   < > 4.8 5.8   < > 4.1 4.1 9.2  --  9.2 7.5 9.2   ANEU 2.1  --  2.6  --  3.0 3.7  --   --  2.7  --   --  6.8  --   --    ALYM 1.3  --  1.4  --  1.1 1.2  --   --  0.8  --   --  0.9  --   --    MARCELO 0.6  --  0.5  --  0.6 0.7  --   --  0.4  --   --  1.3  --   --    AEOS 0.3  --  0.3  --  0.2 0.1  --   --  0.1  --   --  0.0  --   --    HGB 8.7*   < > 11.6*   < > 8.6* 10.9*   < > 12.9* 9.4* 8.8* 7.3* 7.6* 6.9* 7.4*   HCT 28.6*   < > 35.7*   < > 26.6* 33.3*   < > 40.8 27.6* 26.2*  --  24.6* 22.0* 23.7*   *   < > 98   < > 100 105*   < > 107* 99 104* 108* 109* 108* 109*   *   < > 102*   < > 91* 90*   < > 91* 77* 86*  --  105* 97* 110*    < > = values in this interval not displayed.       Recent Labs   Lab Test 01/06/24  0915 01/11/24  1353 11/03/24 2050 03/18/25  0939 06/26/25  1302 06/27/25  0248   BILITOTAL 1.2 1.0 0.7 0.7 1.0 0.8   ALKPHOS 209* 282* 238* 214* 208* 179*   ALBUMIN 3.5 4.1 4.0 4.2 4.0 4.0   AST 23 28 18 21 147* 113*   ALT <5 <5 10 15 54 37       Recent Labs   Lab Test 11/22/22  0025 11/28/22  0504 12/25/22  0525 01/05/23  2352 01/10/23  1210 01/11/23  0443 01/13/23  1750 01/13/23  1753 01/19/23  0627 01/19/23  0631 01/22/23  0528 03/27/23  1144 04/06/23  0808 10/05/23  0248 10/05/23  0600 10/05/23  1244 10/11/23  1430 10/24/23  0537 10/26/23  0457 12/18/23  1436 06/26/25  1159   PCAL  --    < > 2.28*  --  1.99*  --  1.81*  --  1.64*  --  1.78*  --   --   --   --   --   --   --   --  0.81*  --    LACT  --    < >  --    < > 1.5   < >  --    < >  --    < >  --    < > 1.3 2.4* 0.7 0.6* 0.4  --   --   --  1.4   CRP 22.3*  --   --   --   --   --   --   --   --   --   --   --   --   --   --   --   --   --   --   --   --    CRPI  --   --   --   --   --   --   --   --  133.90*  --  138.42*  --   --   --   --   --   --  31.40* 30.40* 11.65*  --     < > = values in this interval not displayed.        Recent Labs   Lab Test 11/16/22  0546 11/29/22  0439 03/18/25  0939   VANCOMYCIN 13.6  --   --    TACROL  --    < > 12.6    < > = values in this interval not displayed.       Culture Results:  7-Day Micro Results       Procedure Component Value Units Date/Time    Ascites Fluid Aerobic Bacterial Culture Routine With Gram Stain     Order Status: Sent Lab Status: No result     Specimen: Ascites Fluid from Peritoneum     MRSA MSSA PCR, Nasal Swab [[id number]] Collected: 06/27/25 0448    Order Status: Completed Lab Status: Final result Updated: 06/27/25 0613    Specimen: Swab from Nares, Bilateral      MRSA Target DNA Negative     SA Target DNA Negative    Narrative:      The [employer]  Xpert SA Nasal Complete assay performed in the [employer]  Dx System is a qualitative in vitro diagnostic test designed for rapid detection of Staphylococcus aureus (SA) and methicillin-resistant Staphylococcus aureus (MRSA) from nasal swabs in patients at risk for nasal colonization. The test utilizes automated real-time polymerase chain reaction (PCR) to detect MRSA/SA DNA. The Xpert SA Nasal Complete assay is intended to aid in the prevention and control of MRSA/SA infections in healthcare settings. The assay is not intended to diagnose, guide or monitor treatment for MRSA/SA infections, or provide results of susceptibility to methicillin. A negative result does not preclude MRSA/SA nasal colonization.     Respiratory Aerobic Bacterial Culture with Gram Stain     Order Status: Sent Lab Status: No result     Specimen: Sputum from Expectorate     Blood Culture Peripheral blood (BC) Arm, Left [[id number]]  (Normal) Collected: 06/26/25 1606    Order Status: Completed Lab Status: Preliminary result Updated: 06/27/25 0716    Specimen: Peripheral blood (BC) from Arm, Left      Culture No growth after 12 hours    Blood Culture Peripheral blood (BC) Arm, Right [[id number]]  (Normal) Collected: 06/26/25 1606    Order Status: Completed  Lab Status: Preliminary result Updated: 06/27/25 0731    Specimen: Peripheral blood (BC) from Arm, Right      Culture No growth after 12 hours            Recent Labs   Lab Test 10/31/17  1038 11/25/22  1859   URINEPH 5.0 5.5   NITRITE Negative Negative   LEUKEST Negative Small*   WBCU 1  --        Recent Labs   Lab Test 12/21/22  1526   CWBC 3   CRBC 58*   CGLU 69   CTP 71*       Recent Labs   Lab Test 10/14/23  1808   IFLUA Not Detected   FLUAH1 Not Detected   FLUAH3 Not Detected   LK2563 Not Detected   IFLUB Not Detected   RSVA Not Detected   RSVB Not Detected   PIV1 Not Detected   PIV2 Not Detected   PIV3 Not Detected   HMPV Not Detected       Recent Labs   Lab Test 10/10/23  1711   CDBPCT Negative       Imaging: CT Chest Pulmonary Embolism w Contrast  Result Date: 6/27/2025  EXAM: CTA pulmonary angiogram, 6/27/2025 6:31 AM HISTORY: Concern for PE- hypoxia, BNP elevation, hx PE not taking AC reliably COMPARISON: CT CAP 11/3/2024. TECHNIQUE: Volumetric CT images obtained through the chest with contrast. Coronal and axial MIP reformatted images obtained. Three-dimensional (3D) post-processed angiographic images were reconstructed, archived to PACS and used in interpretation of this study. CONTRAST: 68 ml isovue 370 IV. FINDINGS: Vascular: There is good contrast opacification of the pulmonary arterial vasculature. Filling defects in posterior segmental and subsegmental arterial branches in the left upper lobe. Heart is normal size without pericardial effusion. No evidence of right heart strain. Similar dilation of the main pulmonary artery up to 4.2 cm. No thoracic aortic aneurysm. Bovine configuration of the great vessels. Mild calcified plaque in the aortic arch. Remaining Chest: Central tracheobronchial tree is patent. Dense consolidation of the majority of the right lower lobe. Multifocal subsegmental atelectasis and/or scarring in the right lung. Scattered patchy consolidative opacities throughout the mid and  lower right lung. Left basilar atelectasis. No pneumothorax. Small right pleural effusion. Pleural thickening predominantly in the superior right hemithorax. Prominent and mildly enlarged paratracheal lymph nodes, for example a right lower paratracheal lymph node measuring up to 1.2 cm in short axis (4/134). Stable pleural calcifications in the right base. Upper Abdomen: Partially visualized hyperattenuating fluid in the left upper quadrant. Bones/Soft Tissues: No acute abnormalities or suspicious lesions. Chronic fracture deformities of bilateral anterior ribs and mid sternal body. Multilevel degenerative changes of the visualized spine. Bilateral gynecomastia.     IMPRESSION: 1. Exam is positive for acute pulmonary embolism. Pulmonary emboli in posterior segmental and subsegmental arterial branches in the left upper lobe. No evidence of right heart strain. 2. Dense consolidation of the right lower lobe, likely atelectasis but infection cannot be entirely excluded. 3. Scattered patchy consolidative opacities in the mid to lower right lung, suggestive of an infectious and/or inflammatory process versus aspiration. 4. Small right pleural effusion with additional pleural thickening in the right hemithorax, predominantly in the superior component. 5. Right upper quadrant ascites. [Urgent Result: Pulmonary emboli in posterior segmental and subsegmental branches in the left upper lobe] Finding was identified on 6/27/2025 at 6:34 AM. Dr. Olivia was contacted by Dr. Armijo on 6/27/2025 at 6:35 AM and verbalized understanding of the urgent finding.  In the event of a positive result for acute pulmonary embolism resulting in right heart strain, consider calling the [facility] hospital  for PERT (Pulmonary Embolism Response Team) Activation? PERT -- Pulmonary Embolism Response Team (Multidisciplinary team including cardiology, interventional radiology, critical care, hematology) I have personally reviewed the examination and  initial interpretation and I agree with the findings. LUIS MIGUEL ADAMS DO   SYSTEM ID:  [id number]    Echocardiogram Complete  Result Date: 2025  [id number]^NELI^RALPH^YOLA  [facility] [facility] [address] [place] [address]  Name: MARY JO CRAIN MRN: [id number] : 1965 Study Date: 2025 07:21 AM Age: 60 yrs Gender: Male Patient [place] [facility] [place] For Study: SOB, Hypotension Ordering Physician: RALPH QUINTEROS Referring Physician: LLEA SHEPARD Performed By: Ruddy Da Silva RDCS  BSA: 2.2 m2 Height: 72 in Weight: 205 lb HR: 70 BP: 97/63 mmHg ______________________________________________________________________________ Procedure Echocardiogram with two-dimensional, color and spectral Doppler. ______________________________________________________________________________ Interpretation Summary Global and regional left ventricular function is normal with an EF of 60-65%. Left ventricular diastolic function is normal. Flattened septum is consistent with right ventricular pressure and volume overload. The right ventricle is normal size. Global right ventricular function is borderline reduced. Moderate to severe tricuspid insufficiency is present. TR is central and appear functional, pulm HTN? The right ventricular systolic pressure is 28mmHg above the right atrial pressure. IVC diameter and respiratory changes fall into an intermediate range suggesting an RA pressure of 8 mmHg. No pericardial effusion is present.  This study was compared with the study from 2024 . TR is new, RA pressure increased.  ______________________________________________________________________________ Left Ventricle Left ventricular size is normal. Left ventricular wall thickness is normal. Global and regional left ventricular function is normal with an EF of 60-65%. Left ventricular diastolic function is normal. Flattened septum  is consistent with right ventricular pressure and volume overload.  Right Ventricle The right ventricle is normal size. Global right ventricular function is borderline reduced.  Atria The right atria appears normal. Moderate left atrial enlargement is present.  Mitral Valve Mild mitral annular calcification is present.  Aortic Valve Trileaflet aortic sclerosis without stenosis. Trace aortic insufficiency is present.  Tricuspid Valve Moderate to severe tricuspid insufficiency is present. The right ventricular systolic pressure is approximated at 28.7 mmHg plus the right atrial pressure. The right ventricular systolic pressure is 28mmHg above the right atrial pressure.  Pulmonic Valve On Doppler interrogation, there is no significant stenosis or regurgitation.  Vessels The aorta root is normal. The thoracic aorta is normal. IVC diameter and respiratory changes fall into an intermediate range suggesting an RA pressure of 8 mmHg.  Pericardium No pericardial effusion is present.  Compared to Previous Study This study was compared with the study from 2024 . TR is new, RA pressure increased. ______________________________________________________________________________ MMode/2D Measurements & Calculations IVSd: 1.1 cm  LVIDd: 4.3 cm LVIDs: 2.8 cm LVPWd: 0.94 cm FS: 35.1 % LV mass(C)d: 141.5 grams LV mass(C)dI: 65.7 grams/m2 asc Aorta Diam: 3.3 cm LVOT diam: 2.3 cm LVOT area: 4.3 cm2 Asc Ao diam index BSA (cm/m2): 1.5 Asc Ao diam index Ht(cm/m): 1.8 LA Volume Index (BP): 42.3 ml/m2 RWT: 0.44 TAPSE: 1.5 cm  Doppler Measurements & Calculations MV E max brain: 97.2 cm/sec MV A max brain: 106.0 cm/sec MV E/A: 0.92 MV dec slope: 535.1 cm/sec2 MV dec time: 0.18 sec Ao V2 max: 224.6 cm/sec Ao max P.2 mmHg Ao V2 mean: 159.5 cm/sec Ao mean P.3 mmHg Ao V2 VTI: 51.5 cm WOLFGANG(I,D): 2.7 cm2 WOLFGANG(V,D): 2.7 cm2 LV V1 max P.9 mmHg LV V1 max: 140.4 cm/sec LV V1 VTI: 32.3 cm SV(LVOT): 138.6 ml SI(LVOT): 64.4 ml/m2 PA acc time:  0.06 sec TR max baljeet: 268.1 cm/sec TR max P.7 mmHg AV Baljeet Ratio (DI): 0.63 WOLFGANG Index (cm2/m2): 1.3 E/E' av.6 Lateral E/e': 7.8 Medial E/e': 9.4  ______________________________________________________________________________ Report approved by: LYDIA NUNEZ MD on 2025 08:38 AM       CT Abdomen Pelvis w Contrast  Result Date: 2025  EXAM: CT ABDOMEN PELVIS W CONTRAST LOCATION: [facility] DATE: 2025 INDICATION: New abdominal pain and hypotension. COMPARISON: CT chest abdomen and pelvis 11/3/2024. TECHNIQUE: CT scan of the abdomen and pelvis was performed following injection of IV contrast. Multiplanar reformats were obtained. Dose reduction techniques were used. CONTRAST: 103 mL Isovue 370 FINDINGS: LOWER CHEST: Bilateral gynecomastia. Chronic small right pleural effusion with associated pleural thickening and calcifications. Right lower lobe calcified granuloma and chronic right lower lobe rounded atelectasis appear similar. Trace left pleural effusion with adjacent atelectasis. Cardiomegaly. HEPATOBILIARY: Status post liver transplant. Lobulated contours of the transplanted liver appear similar. A tiny left hepatic cyst is unchanged. No new liver lesions. No biliary ductal dilation. PANCREAS: A 0.8 cm cystic lesion within the pancreatic body is unchanged (3/#78). No dilation of the main pancreatic duct. SPLEEN: Splenomegaly measuring 18.3 cm. ADRENAL GLANDS: Normal. KIDNEYS/BLADDER: Atrophic kidneys. Nonobstructing right upper pole renal calculus measuring 0.5 cm. Multiple nonobstructing left renal calculi measuring up to 0.3 cm. Multiple subcentimeter hypodense renal lesions which are too small to characterize and do not require specific follow-up. No hydronephrosis. Bladder is decompressed. BOWEL: No evidence of bowel obstruction or inflammation. Appendix is absent. PERITONEUM/RETROPERITONEUM: Moderate to large amount of ascites. LYMPH NODES: No enlarged lymph node.  Scattered prominent lymph nodes are present within the central mesentery are likely reactive in nature. VASCULATURE: Patent portal, splenic, and superior mesenteric veins. Mild aortobiiliac atherosclerosis. No abdominal aortic aneurysm. PELVIC ORGANS: Normal. MUSCULOSKELETAL: Large left inguinal hernia containing ascites. Shallow lower ventral abdominal wall hernias containing ascites. Moderate midline upper abdominal wall hernia containing ascites and a small amount of fat. Multiple old healed rib fractures.  Multilevel degenerative change of the spine. Unchanged compression deformities of the L1 and T10 vertebral bodies. No acute bony abnormality.     IMPRESSION: 1.  No acute process in the abdomen or pelvis. 2.  Moderate to large amount of ascites. 3.  Trace left pleural effusion with adjacent atelectasis. Chronic small right pleural effusion with associated pleural thickening and adjacent rounded atelectasis. 4.  Atrophic kidneys with multiple bilateral nonobstructing intrarenal calculi. No hydronephrosis. 5.  Additional nonemergent findings as described in the report body.    CT Head w/o Contrast  Result Date: 6/26/2025  EXAM: CT HEAD W/O CONTRAST LOCATION: [facility] DATE: 6/26/2025 INDICATION: ams COMPARISON: 11/3/2024 head CT. TECHNIQUE: Routine CT Head without IV contrast. Multiplanar reformats. Dose reduction techniques were used. FINDINGS: INTRACRANIAL CONTENTS: No intracranial hemorrhage, extraaxial collection, or mass effect.  No CT evidence of acute infarct. Mild presumed chronic small vessel ischemic changes. Mild generalized volume loss. No hydrocephalus. VISUALIZED ORBITS/SINUSES/MASTOIDS: No intraorbital abnormality. No paranasal sinus mucosal disease. No middle ear or mastoid effusion. BONES/SOFT TISSUES: No acute abnormality.     IMPRESSION: 1.  Age-related changes as above with no acute intracranial abnormality.     XR Chest 2 Views  Result Date: 6/26/2025  EXAM: XR  CHEST 2 VIEWS LOCATION: [facility] DATE: 6/26/2025 INDICATION: Shortness of breath COMPARISON: Chest radiograph 6/22/2025     IMPRESSION: Cardiac silhouette is enlarged, not significantly changed from prior exam. Tunneled dialysis catheter with tip overlying the right atrium. There is pulmonary vascular congestion with small bilateral pleural effusions, right greater than left.  No lobar airspace consolidation or pneumothorax. No acute bony abnormality.    IR Dialysis Fistulogram Left  Result Date: 6/23/2025  [facility] LOCATION: [facility] DATE: 6/23/2025 PROCEDURE: 1. Left upper extremity fistula declot 2. Tunneled dialysis catheter placement INTERVENTIONAL RADIOLOGIST: Héctor Mckinnon MD. INDICATION: 60-year-old man with renal failure, left arm brachiocephalic fistula. Fistula is thrombosed, presents for declot, possible tunnel catheter placement.. CONSENT: The risks, benefits and alternatives of dialysis arteriovenous shunt evaluation with possible angioplasty, stent placement, thrombolysis and/or tunneled dialysis catheter placement were discussed with the patient in detail. All questions were answered. Informed consent was given to proceed with the procedure. MODERATE SEDATION: Versed 2 mg IV; Fentanyl 125 mcg IV.  During the timeout, immediately prior to the administration of medications, the patient was reassessed for adequacy to receive conscious sedation. Under physician supervision, Versed and fentanyl were administered for moderate sedation. Pulse oximetry, heart rate and blood pressure were continuously monitored by an independent trained observer. The physician spent 97 minutes of face-to-face sedation time with the patient. CONTRAST: 60 mL intravenous ANTIBIOTICS: None. ADDITIONAL MEDICATIONS: Heparin 6000 U IV estimated blood loss: 200 mL FLUOROSCOPIC TIME: 16.5 minutes. RADIATION DOSE: Air Kerma: 26.7 mGy. COMPLICATIONS: No immediate  complications. STERILE BARRIER TECHNIQUE: Maximum sterile barrier technique was used. Cutaneous antisepsis was performed at the operative site with application of 2% chlorhexidine and large sterile drape. Prior to the procedure, the  and assistant performed hand hygiene and wore hat, mask, sterile gown, and sterile gloves during the entire procedure. PROCEDURE: Prior to the procedure the dialysis access was evaluated with ultrasound. Images saved to the permanent patient medical record. The skin overlying the arterial limb of the fistula was accessed under ultrasound guidance in an antegrade direction. Access was secured using a 6 Mozambican vascular sheath. A catheter and guidewire were navigated through the occluded fistula into left subclavian vein. Venogram was performed to evaluate the central veins. Pullback venogram was then performed to evaluate the distal extent of the fistula clot. A 5 Mozambican, 20 cm [employer] infusion length catheter was then advanced over the guidewire.  Through the infusion catheter, 6 mg of TPA was pulse sprayed into the thrombosed aneurysm. Stiff guidewire was then advanced into the IVC. The thrombus within the occluded fistula was macerated with an 8 mm balloon. Repeat fistula gram was performed which showed persistent occlusion of the fistula. Decision was made to perform thrombectomy through debulk the thrombus within the aneurysmal fistula. The 6 Mozambican sheath was exchanged for a short 8 Mozambican sheath. The 8 Mozambican AngioJet device was then advanced over a guidewire into the thrombosed fistula. Multiple passes of thrombectomy was performed throughout the thrombosed fistula with removal of a large volume of thrombus. The fistula was angioplastied up to 12 mm. Repeat fistulogram was performed. At this point, although a large amount of thrombus was removed flow through the fistula remains poor with residual thrombus within the aneurysmal fistula. Discussed with Dr. Mcneil in  vascular surgery, decision made to place a tunneled dialysis catheter. He will consider surgical revision of the fistula. The sheath was then removed. Pursestring suture was placed around the access site using absorbable suture. Patient developed a subcutaneous hematoma from the fistula access after the sheath was removed which extended in the proximal forearm. Hemostasis was achieved with prolonged manual pressure. The forearm hematoma remains stable in size during dialysis catheter placement. He had some pain at the hematoma site but no symptoms in his hand. Attention was turned to tunneled dialysis catheter placement after failed declot. Limited right neck ultrasound demonstrated a patent internal jugular vein; images saved of the permanent patient medical record. The overlying skin and subcutaneous soft tissues were anesthetized using 1% lidocaine. Under ultrasound guidance, the right internal jugular vein was accessed using a micropuncture needle. Access was secured using a 4 Persian transitional sheath. A guidewire was advanced centrally. Our attention was turned to the chest wall. The overlying skin and subcutaneous soft tissues were anesthetized using 1% lidocaine with epinephrine. A 3 mm transverse incision was made. The catheter tubing was tunneled in an antegrade fashion from the chest wall incision to the neck dermatotomy site. Using this access, a 23 cm tip to cuff palindrome dialysis catheter was advanced under fluoroscopic guidance until the tip was in the proximal right atrium. The catheter was tested and found to flush and aspirate appropriately. The catheter was heparinized and secured to the skin. The neck dermatotomy was closed with absorbable suture and skin glue. A purse string suture was placed at the catheter exit site using absorbable suture. FINDINGS: Ultrasound evaluation of the left upper extremity fistula demonstrates thrombosed, aneurysmal left upper extremity fistula. The juxta  anastomotic fistula was patent. The juxta anastomotic fistula was accessed. Intial left upper extremity fistulogram demonstrates the juxta anastomotic fistula and arterial anastomosis are patent. The cannulating segment of the fistula is aneurysmal and thrombosed. The fistula venous outflow is thrombosed to the level of the upper arm. The left upper extremity central veins are widely patent. Pharmacologic and mechanical thrombectomy of the left upper extremity fistula and aspiration thrombectomy with the apex [place]errat[place] AngioJet device resulted in deep bulking of the thrombus within the fistula and restoration of some flow through the fistula. However at the conclusion of the procedure there is residual thrombus within the aneurysmal fistula and flow is likely inadequate for long-term patency. A subcutaneous hematoma developed around the left elbow/proximal forearm following sheath removal. This was stable following prolonged manual pressure. Bruising and pain at this site over the next few days as expected.. 23 cm cuff to tip tunneled dialysis catheter placed via the right internal jugular vein with the tip in the right atrium.     IMPRESSION:  1. Thrombosed, aneurysmal left upper extremity brachiocephalic fistula. 2. Unsuccessful left upper extremity fistula declot. The thrombus within the fistula was debulked and some flow was restored, however unable to restore sufficient flow for long-term patency. 3. Right chest tunneled dialysis catheter placed with the tip in the right atrium. PLAN: 1.  Patient to be recovered on the inpatient floor. 2.  Tunneled dialysis catheter is ready for immediate use.     IR CVC Tunnel Placement > 5 Yrs of Age  Result Date: 6/23/2025  [facility] LOCATION: [facility] DATE: 6/23/2025 PROCEDURE: 1. Left upper extremity fistula declot 2. Tunneled dialysis catheter placement INTERVENTIONAL RADIOLOGIST: Héctor Mckinnon MD. INDICATION: 60-year-old man with renal  failure, left arm brachiocephalic fistula. Fistula is thrombosed, presents for declot, possible tunnel catheter placement.. CONSENT: The risks, benefits and alternatives of dialysis arteriovenous shunt evaluation with possible angioplasty, stent placement, thrombolysis and/or tunneled dialysis catheter placement were discussed with the patient in detail. All questions were answered. Informed consent was given to proceed with the procedure. MODERATE SEDATION: Versed 2 mg IV; Fentanyl 125 mcg IV.  During the timeout, immediately prior to the administration of medications, the patient was reassessed for adequacy to receive conscious sedation. Under physician supervision, Versed and fentanyl were administered for moderate sedation. Pulse oximetry, heart rate and blood pressure were continuously monitored by an independent trained observer. The physician spent 97 minutes of face-to-face sedation time with the patient. CONTRAST: 60 mL intravenous ANTIBIOTICS: None. ADDITIONAL MEDICATIONS: Heparin 6000 U IV estimated blood loss: 200 mL FLUOROSCOPIC TIME: 16.5 minutes. RADIATION DOSE: Air Kerma: 26.7 mGy. COMPLICATIONS: No immediate complications. STERILE BARRIER TECHNIQUE: Maximum sterile barrier technique was used. Cutaneous antisepsis was performed at the operative site with application of 2% chlorhexidine and large sterile drape. Prior to the procedure, the  and assistant performed hand hygiene and wore hat, mask, sterile gown, and sterile gloves during the entire procedure. PROCEDURE: Prior to the procedure the dialysis access was evaluated with ultrasound. Images saved to the permanent patient medical record. The skin overlying the arterial limb of the fistula was accessed under ultrasound guidance in an antegrade direction. Access was secured using a 6 Libyan vascular sheath. A catheter and guidewire were navigated through the occluded fistula into left subclavian vein. Venogram was performed to evaluate the  central veins. Pullback venogram was then performed to evaluate the distal extent of the fistula clot. A 5 Polish, 20 cm [employer] infusion length catheter was then advanced over the guidewire.  Through the infusion catheter, 6 mg of TPA was pulse sprayed into the thrombosed aneurysm. Stiff guidewire was then advanced into the IVC. The thrombus within the occluded fistula was macerated with an 8 mm balloon. Repeat fistula gram was performed which showed persistent occlusion of the fistula. Decision was made to perform thrombectomy through debulk the thrombus within the aneurysmal fistula. The 6 Polish sheath was exchanged for a short 8 Polish sheath. The 8 Polish AngioJet device was then advanced over a guidewire into the thrombosed fistula. Multiple passes of thrombectomy was performed throughout the thrombosed fistula with removal of a large volume of thrombus. The fistula was angioplastied up to 12 mm. Repeat fistulogram was performed. At this point, although a large amount of thrombus was removed flow through the fistula remains poor with residual thrombus within the aneurysmal fistula. Discussed with Dr. Mcneil in vascular surgery, decision made to place a tunneled dialysis catheter. He will consider surgical revision of the fistula. The sheath was then removed. Pursestring suture was placed around the access site using absorbable suture. Patient developed a subcutaneous hematoma from the fistula access after the sheath was removed which extended in the proximal forearm. Hemostasis was achieved with prolonged manual pressure. The forearm hematoma remains stable in size during dialysis catheter placement. He had some pain at the hematoma site but no symptoms in his hand. Attention was turned to tunneled dialysis catheter placement after failed declot. Limited right neck ultrasound demonstrated a patent internal jugular vein; images saved of the permanent patient medical record. The overlying skin and  subcutaneous soft tissues were anesthetized using 1% lidocaine. Under ultrasound guidance, the right internal jugular vein was accessed using a micropuncture needle. Access was secured using a 4 [place] African transitional sheath. A guidewire was advanced centrally. Our attention was turned to the chest wall. The overlying skin and subcutaneous soft tissues were anesthetized using 1% lidocaine with epinephrine. A 3 mm transverse incision was made. The catheter tubing was tunneled in an antegrade fashion from the chest wall incision to the neck dermatotomy site. Using this access, a 23 cm tip to cuff palindrome dialysis catheter was advanced under fluoroscopic guidance until the tip was in the proximal right atrium. The catheter was tested and found to flush and aspirate appropriately. The catheter was heparinized and secured to the skin. The neck dermatotomy was closed with absorbable suture and skin glue. A purse string suture was placed at the catheter exit site using absorbable suture. FINDINGS: Ultrasound evaluation of the left upper extremity fistula demonstrates thrombosed, aneurysmal left upper extremity fistula. The juxta anastomotic fistula was patent. The juxta anastomotic fistula was accessed. Intial left upper extremity fistulogram demonstrates the juxta anastomotic fistula and arterial anastomosis are patent. The cannulating segment of the fistula is aneurysmal and thrombosed. The fistula venous outflow is thrombosed to the level of the upper arm. The left upper extremity central veins are widely patent. Pharmacologic and mechanical thrombectomy of the left upper extremity fistula and aspiration thrombectomy with the apex [place] AngioJet device resulted in deep bulking of the thrombus within the fistula and restoration of some flow through the fistula. However at the conclusion of the procedure there is residual thrombus within the aneurysmal fistula and flow is likely inadequate for long-term patency. A  subcutaneous hematoma developed around the left elbow/proximal forearm following sheath removal. This was stable following prolonged manual pressure. Bruising and pain at this site over the next few days as expected.. 23 cm cuff to tip tunneled dialysis catheter placed via the right internal jugular vein with the tip in the right atrium.     IMPRESSION:  1. Thrombosed, aneurysmal left upper extremity brachiocephalic fistula. 2. Unsuccessful left upper extremity fistula declot. The thrombus within the fistula was debulked and some flow was restored, however unable to restore sufficient flow for long-term patency. 3. Right chest tunneled dialysis catheter placed with the tip in the right atrium. PLAN: 1.  Patient to be recovered on the inpatient floor. 2.  Tunneled dialysis catheter is ready for immediate use.     Chest XR,  PA & LAT  Result Date: 6/22/2025  EXAM: XR CHEST 2 VIEWS LOCATION: [facility] DATE: 6/22/2025 INDICATION: missed dialysis Friday, short of breath COMPARISON: None.     IMPRESSION: Heart size upper limits of normal. Normal pulmonary vascularity. There is some focal atelectasis in the right lung base. Probable small right effusion. Left lung clear.

## 2025-06-27 NOTE — PROGRESS NOTES
[facility]  Reason for admission/transfer: Pressor requirements & presumed infection  Patient status upon admission/transfer: Levo 0.06, A&O x 3, VSS  Interventions: Peripheral levo continued  Plan: MAP>65, Infection workup  2 RN skin assessment: completed by Kacey Ozuna  Sexual Orientation and Gender Identity (SOGI) smartfom completed: Not Done  Result of skin assessment and interventions/actions: Thrombectomy incision on L upper arm - closed with strips, L arm bruising, Abdominal scars from prior surgeries, intermittent bruising, R upper chest Dried blood/skin tag  Height, weight, drug calc weight: Done  Patient belongings (see Flowsheet - Adult Profile for details): All in bag - Clothes, phone, laptop,   MDRO education (if applicable):  Done

## 2025-06-27 NOTE — ED PROVIDER NOTES
Signout taken from Dr. Marquez.  Patient was hypotensive here and is a very complicated patient.  The plan was for him to be admitted.  However he ended up requiring IV pressors.  Dr. Waldrop was going to be the admitting doctor here.  However now he needs ICU, and he will be transferred to [facility] ICU.     Juan Greenfield MD  06/26/25 [id number]

## 2025-06-27 NOTE — PROGRESS NOTES
MEDICAL ICU H&P  06/26/2025    Date of Hospital Admission: ***  Date of ICU Admission: ***  Reason for Critical Care Admission: ***  Date of Service (when I saw the patient): 06/26/2025    ASSESSMENT: Blanco Osborne is a 60 year old male with PMH *** who was admitted on 6/26/2025 ***    CHANGES and MAJOR THINGS TODAY:   ***    PLAN:    Neuro:  # Pain and sedation  ***   - RASS goal ***    #1 Likely focal epilepsy: 2 focal to bilateral generalized tonic-clonic seizures were captured during video-EEG monitoring at [facility] which were identical semiologically.  Ictal EEG onset was not clear with one due to impedance check during seizure onset, and was left occipital and the other one.  Patient's MRI showed remote right parietal stroke and acute left frontotemporal and right frontal small embolic strokes.  The patient was in critical condition in the hospital, with septic shock, renal failure and likely electrolyte abnormalities.  He never had seizures prior to this hospital admission and no seizures were reported since discharge from the hospital.   Levetiracetam was tapered to off since last visit.  lacosamide was decreased to .  He would like to decrease her lacosamide further.    I discussed possibility of seizure-recurrence since he has history of multiple strokes and is at risk of seizure recurrence. The patient understands and still wants to decrease his medication. He is hoping to taper this medication to off to be able to fly again.  I advised this is unlikely. He needs to do some research and finds out about other possible limitations, such as his strokes and Afib before deciding about discontinuing Vimpat.     - Decrease lacosamide to 50 mg bid.     - Obtain lacosamide level in 1 week.    Pulmonary:  # ***  ***    Cardiovascular:  # ***  ***     GI/Nutrition:  # ***  ***     Renal/Fluids/Electrolytes:  # ***  - ***     Endocrine:  # ***  ***     ID:  # ***  ***     Hematology:    # ***  ***      Musculoskeletal:  # ***  ***     Skin:  # ***  ***    General Cares/Prophylaxis:    DVT Prophylaxis: {DVT PROPHYLAXIS:612945}  GI Prophylaxis: {GI Prophylaxis:268875}  Restraints: ***  Family Communication: ***  Code Status: ***    Lines/tubes/drains:  - ***    Disposition:  - Medical ICU ***    Patient seen and findings/plan discussed with medical ICU staff,  ***.    Rashida Olivia MD      Clinically Significant Risk Factors Present on Admission   { TIP  This section helps capture the illness of the patient on admission.     - Review diagnoses highlighted in blue; right click, edit & delete if not appropriate   - If blank, no additional diagnoses identified   :88126}      # Hyponatremia: Lowest Na = 131 mmol/L in last 2 days, will monitor as appropriate  # Hypochloremia: Lowest Cl = 91 mmol/L in last 2 days, will monitor as appropriate  # Hypocalcemia: Lowest Ca = 8.5 mg/dL in last 2 days, will monitor and replace as appropriate      # Drug Induced Coagulation Defect: home medication list includes an anticoagulant medication  # Drug Induced Platelet Defect: home medication list includes an antiplatelet medication   # Hypertension: Noted on problem list   # Circulatory Shock: required vasopressors within past 24 hours       # Anemia: based on hgb <11       # Overweight: Estimated body mass index is 27.97 kg/m  as calculated from the following:    Height as of 6/24/25: 1.829 m (6').    Weight as of 6/24/25: 93.5 kg (206 lb 3.2 oz).       # Financial/Environmental Concerns:        # Anemia: based on hgb <11             -----------------------------------------------------------------------    HISTORY PRESENTING ILLNESS:   Blanco Osborne is a 59 yo male with Pmhx ESRD (on HD MWF since 1/23), NAFLD s/p liver transplant (9/2016), PEA arrest 2/2 hypoxic respiratory failure (2022), pAF, CVA, HLD, GERD, CHRISTIAN on CPAP, RLS, and hx seizures.             among others who presented to the ED with complaints of mild  shortness of breath. Patient missed his last planned dialysis run this past Friday due to his fistula not working and noted concern of clot. Thrill is not palpable in ED. He has noted some dull chest discomfort and has felt winded with activity in recent days. He denies any new edema in his legs but does feel some fullness in his abdomen. He follows with Dr. Bradshaw-he was unable to get IR scheduled for fistulogram on Friday / over the weekend.             PAST MEDICAL HISTORY:   Past Medical History:   Diagnosis Date     Acquired immunocompromised state 11/19/2022     Acute renal failure, unspecified acute renal failure type 11/12/2022     Antiplatelet or antithrombotic long-term use      Arrhythmia     PEA     Ascites      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     Cancer (H) 2023    Squamous Cell Cancer- Since resolved     Cirrhosis of liver with ascites (H) 02/11/2016     Coagulopathy      Critical illness myopathy      Dialysis patient     DIALYSIS 3X/ WEEK     Embolic stroke (H) 11/20/2022     Encephalopathy      ESRD (end stage renal disease) on dialysis (H)      Gastroesophageal reflux disease      H/O alcohol abuse      History of blood transfusion      Hyperammonemia      Hyperlipidemia      Hypertension     Patients states that HTN has been resolved     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     Insomnia      Lactic acidosis 11/12/2022     Liver replaced by transplant (H) 09/20/2016     NAFLD (nonalcoholic fatty liver disease) 02/11/2016     CHRISTIAN on CPAP      Parainfluenza type 1 infection 11/19/2022     Parapneumonic effusion      Pleural effusion      Pneumothorax on left 12/16/2022     Respiratory acidosis 11/12/2022     Respiratory arrest (H) 11/12/2022     Restless legs syndrome (RLS)      SBP (spontaneous bacterial peritonitis) (H)     [facility]     Seizures (H) 12/2022     Sepsis due to Streptococcus pneumoniae with acute hypoxic respiratory failure (H) 11/19/2022     Stroke, embolic (H)  11/20/2022     Sudden cardiac arrest (H) 12/29/2022    PEA- 2 min of CPR     Tubular adenoma 10/2019    Large cecal adenoma- due for surveillance colonoscopy in 3 years (10/2022)     SURGICAL HISTORY:  Past Surgical History:   Procedure Laterality Date     APPENDECTOMY       BENCH LIVER N/A 09/20/2016    Procedure: BENCH LIVER;  Surgeon: Enoc Crews MD;  Location: [facility] OR     BRONCHOSCOPY FLEXIBLE AND RIGID N/A 12/20/2022    Procedure: BRONCHOSCOPY;  Surgeon: Alena Valenzuela MD;  Location:  GI     COLONOSCOPY  08/06/2013    repeat in 2018     COLONOSCOPY N/A 10/04/2019    Procedure: COLONOSCOPY, WITH POLYPECTOMY AND BIOPSY;  Surgeon: Go Chong MD;  Location: [facility] OR     COLONOSCOPY N/A 3/26/2024    Procedure: COLONOSCOPY, FLEXIBLE, WITH LESION REMOVAL USING SNARE;  Surgeon: Jie Douglas MD;  Location:  GI     CREATE FISTULA ARTERIOVENOUS UPPER EXTREMITY Left 03/21/2023    Procedure: LEFT UPPER EXTREMITY ARTERIOVENOUS FISTULA CREATION. LIGATION OF COMPETING VASCULAR BRANCHES- LEFT;  Surgeon: Deejay Mcneil MD;  Location:  OR     CV CORONARY ANGIOGRAM N/A 2/22/2024    Procedure: Coronary Angiogram;  Surgeon: Nima Dimas MD;  Location:  HEART CARDIAC CATH LAB     CV PERICARDIOCENTESIS N/A 9/29/2023    Procedure: Pericardiocentesis;  Surgeon: Barry Mckeon MD;  Location:  HEART CARDIAC CATH LAB     CV PERICARDIOCENTESIS N/A 10/11/2023    Procedure: Pericardiocentesis;  Surgeon: Ulises Bhakta MD;  Location:  HEART CARDIAC CATH LAB     CV RIGHT HEART CATH MEASUREMENTS RECORDED N/A 10/11/2023    Procedure: Right Heart Catheterization;  Surgeon: Ulises Bhakta MD;  Location:  HEART CARDIAC CATH LAB     HERNIA REPAIR       IR CHEST TUBE PLACEMENT NON-TUNNELED RIGHT  12/12/2022     IR CVC TUNNEL PLACEMENT > 5 YRS OF AGE  12/06/2022     IR CVC TUNNEL PLACEMENT > 5 YRS OF AGE  1/3/2024     IR CVC TUNNEL PLACEMENT > 5 YRS OF AGE  6/23/2025      IR DIALYSIS FISTULOGRAM LEFT  2023     IR DIALYSIS FISTULOGRAM LEFT  2025     IR GASTROSTOMY TUBE CHANGE  2023     IR GASTROSTOMY TUBE PERCUTANEOUS PLCMNT  2022     IR PARACENTESIS  2022     IR PARACENTESIS  2022     IR PARACENTESIS  2/10/2016     IR PARACENTESIS  2016     IR THORACENTESIS  2022     IR THORACENTESIS  2020     IR THORACENTESIS  08/10/2020     IR TRANSCATHETER BIOPSY  2022     IR TRANSCATHETER BIOPSY  2024     REVISION FISTULA ARTERIOVENOUS UPPER EXTREMITY Left 8/15/2023    Procedure: 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 2; OUTFLOW REVISION CEPHALIC TO BRACHIAL VEIN;  Surgeon: Deejay Mcneil MD;  Location: [facility] OR     REVISION FISTULA ARTERIOVENOUS UPPER EXTREMITY Left 1/3/2024    Procedure: Excision and repair of LEFT brachiocephalic arteriovenous fistula and vein patch angioplasty;  Surgeon: Deejay Mcneil MD;  Location: [facility] OR     THROMBECTOMY UPPER EXTREMITY Left 2025    Procedure: Thrombectomy upper extremity, repair of aneurysm, left upper arm;  Surgeon: Deejay Mcneil MD;  Location: [facility] OR     TRACHEOSTOMY N/A 2022    Procedure: TRACHEOSTOMY;  Surgeon: Nilesh Jackson MD;  Location: [facility] OR     TRANSPLANT LIVER RECIPIENT  DONOR N/A 2016    Procedure: TRANSPLANT LIVER RECIPIENT  DONOR;  Surgeon: Enoc Crews MD;  Location: [facility] OR     SOCIAL HISTORY:  Social History     Socioeconomic History     Marital status:    Tobacco Use     Smoking status: Never     Passive exposure: Never     Smokeless tobacco: Never   Vaping Use     Vaping status: Never Used   Substance and Sexual Activity     Alcohol use: Not Currently     Alcohol/week: 0.0 standard drinks of alcohol     Drug use: No     Sexual activity: Not Currently     Partners: Female     Social Drivers of Health     Financial Resource Strain: Low Risk  (2024)    Financial Resource Strain       Within the past 12 months, have you or your family members you live with been unable to get utilities (heat, electricity) when it was really needed?: No   Food Insecurity: No Food Insecurity (4/18/2025)    Received from [facility]    Hunger Vital Sign      Worried About Running Out of Food in the Last Year: Never true      Ran Out of Food in the Last Year: Never true   Transportation Needs: No Transportation Needs (4/18/2025)    Received from [facility]    PRAPARE - Transportation      Lack of Transportation (Medical): No      Lack of Transportation (Non-Medical): No   Physical Activity: Insufficiently Active (4/18/2025)    Received from [facility]    Exercise Vital Sign      Days of Exercise per Week: 2 days      Minutes of Exercise per Session: 10 min   Interpersonal Safety: Low Risk  (11/19/2024)    Interpersonal Safety      Do you feel physically and emotionally safe where you currently live?: Yes      Within the past 12 months, have you been hit, slapped, kicked or otherwise physically hurt by someone?: No      Within the past 12 months, have you been humiliated or emotionally abused in other ways by your partner or ex-partner?: No   Housing Stability: Low Risk  (4/18/2025)    Received from [facility]    Housing Stability      What is your living situation today?: I have a steady place to live     FAMILY HISTORY:   Family History   Problem Relation Age of Onset     Coronary Artery Disease No family hx of      Cardiomyopathy No family hx of      Anesthesia Reaction No family hx of      Deep Vein Thrombosis (DVT) No family hx of      ALLERGIES:   No Known Allergies  MEDICATIONS:  Current Facility-Administered Medications   Medication Dose Route Frequency Provider Last Rate Last Admin     acetaminophen (TYLENOL) tablet 650 mg  650 mg Oral Q4H PRN Meredith Waldrop DO        Or     acetaminophen (TYLENOL) Suppository 650 mg  650 mg Rectal Q4H PRN eMredith Waldrop DO         cefTRIAXone (ROCEPHIN) 2 g vial  to attach to  ml bag for ADULTS or NS 50 ml bag for PEDS  2 g Intravenous Q24H Meredith Waldrop DO         midodrine (PROAMATINE) tablet 10 mg  10 mg Oral TID w/meals Meredith Waldrop DO         [START ON 6/27/2025] midodrine (PROAMATINE) tablet 30 mg  30 mg Oral Once per day on Monday Wednesday Friday Kb Moulton MD         norepinephrine (LEVOPHED) 4 mg in  mL PERIPHERAL infusion  0.01-0.2 mcg/kg/min Intravenous Continuous Juan Greenfield MD 10.5 mL/hr at 06/26/25 2308 0.03 mcg/kg/min at 06/26/25 2308     ondansetron (ZOFRAN) injection 4 mg  4 mg Intravenous Q6H PRN Meredith Waldrop DO   4 mg at 06/26/25 2202    Or     ondansetron (ZOFRAN ODT) ODT tab 4 mg  4 mg Oral Q6H PRN Meredith Waldrop DO         oxyCODONE (ROXICODONE) tablet 5 mg  5 mg Oral Q6H PRN Meredith Waldrop DO         sodium chloride (PF) 0.9% PF flush 10 mL  10 mL Intracatheter Q15 Min PRN OSMIN Reilly MD         sodium chloride (PF) 0.9% PF flush 10 mL  10 mL Intracatheter Q15 Min PRN OSMIN Reilly MD         sodium chloride (PF) 0.9% PF flush 9 mL  9 mL Intracatheter During Dialysis/CRRT (from stock) OSMIN Reilly MD         sodium chloride (PF) 0.9% PF flush 9 mL  9 mL Intracatheter During Dialysis/CRRT (from stock) OSMIN Reilly MD         sodium chloride 0.9% BOLUS 100-150 mL  100-150 mL Intravenous Q15 Min PRN OSMIN Reilly MD   Stopped at 06/26/25 2118     Current Outpatient Medications   Medication Sig Dispense Refill     acetaminophen (TYLENOL) 325 MG tablet Take 2 tablets (650 mg) by mouth every 8 hours as needed for other (For optimal non-opioid multimodal pain management to improve pain control.)       aspirin 81 MG EC tablet Take 1 tablet (81 mg) by mouth daily 90 tablet 3     calcium carbonate (TUMS) 500 MG chewable tablet Take 1 chew tab by mouth 4 times daily as needed for heartburn.       famotidine (PEPCID) 20 MG tablet Take 20 mg by mouth 2 times daily as needed.        gabapentin (NEURONTIN) 100 MG capsule Take 300 mg by mouth nightly as needed for other (RLS).       melatonin 3 MG tablet Take 3 mg by mouth nightly as needed for sleep.       midodrine (PROAMATINE) 10 MG tablet TAKE 1 TABLET 3X DAILY. ON DAILYSIS DAYS(M,W,F) TAKE 2 EXTRA TABLETS 1 HOUR BEFORE,1 TABLET WHEN YOU ARRIVE, AND 1 TABLET DURING DIALYSIS. 450 tablet 1     multivitamin RENAL (TRIPHROCAPS) 1 capsule capsule TAKE 1 CAPSULE BY MOUTH DAILY 90 capsule 3     omeprazole (PRILOSEC) 40 MG DR capsule TAKE 1 CAPSULE(40 MG) BY MOUTH DAILY 90 capsule 1     oxyCODONE (ROXICODONE) 5 MG tablet Take 1 tablet (5 mg) by mouth every 6 hours as needed for moderate pain.       tacrolimus (GENERIC EQUIVALENT) 1 MG capsule TAKE 1 CAPSULE BY MOUTH EVERY 12 HOURS 180 capsule 3     warfarin ANTICOAGULANT (COUMADIN/JANTOVEN) 2 MG tablet Take 8 mg by mouth every Monday and Friday, 6 mg by mouth all other days         PHYSICAL EXAMINATION:  Temp:  [97.9  F (36.6  C)-98.5  F (36.9  C)] 98  F (36.7  C)  Pulse:  [] 66  Resp:  [12-27] 16  BP: ()/(41-85) 89/60  SpO2:  [79 %-100 %] 100 %  General: ***  HEENT: ***  Neuro: A&Ox3, NAD***  Pulm/Resp: Clear breath sounds bilaterally without rhonchi, crackles or wheeze, breathing non-labored***  CV: RRR, ***  Abdomen: Soft, non-distended, non-tender***  : *** gomez catheter in place, urine yellow and clear  Incisions/Skin: ***    LABS: Reviewed.   Arterial Blood Gases   No lab results found in last 7 days.  Complete Blood Count   Recent Labs   Lab 06/26/25  1302 06/25/25  0713 06/24/25  0650 06/22/25 2004   WBC 9.2  --  9.2 4.1   HGB 7.6* 7.3* 8.8* 9.4*   *  --  86* 77*     Basic Metabolic Panel  Recent Labs   Lab 06/26/25  1302 06/25/25  0713 06/24/25  0650 06/23/25  0621   * 131* 135 133*   POTASSIUM 4.2 4.3 4.9 4.6   CHLORIDE 91* 91* 94* 96*   CO2 26 24 23 21*   BUN 25.4* 46.4* 36.0* 66.2*   CR 5.64* 7.96* 6.73* 10.79*   * 130* 103* 98     Liver Function  Tests  Recent Labs   Lab 06/26/25  1302 06/25/25  0713 06/24/25  0650 06/23/25  1047   *  --   --   --    ALT 54  --   --   --    ALKPHOS 208*  --   --   --    BILITOTAL 1.0  --   --   --    ALBUMIN 4.0  --   --   --    INR 1.47* 1.27* 1.49* 1.31*     Coagulation Profile  Recent Labs   Lab 06/26/25  1302 06/25/25  0713 06/24/25  0650 06/23/25  1047   INR 1.47* 1.27* 1.49* 1.31*       IMAGING:  Recent Results (from the past 24 hours)   XR Chest 2 Views    Narrative    EXAM: XR CHEST 2 VIEWS  LOCATION: [facility]  DATE: 6/26/2025    INDICATION: Shortness of breath  COMPARISON: Chest radiograph 6/22/2025      Impression    IMPRESSION: Cardiac silhouette is enlarged, not significantly changed from prior exam. Tunneled dialysis catheter with tip overlying the right atrium. There is pulmonary vascular congestion with small bilateral pleural effusions, right greater than left.   No lobar airspace consolidation or pneumothorax. No acute bony abnormality.   CT Head w/o Contrast    Narrative    EXAM: CT HEAD W/O CONTRAST  LOCATION: [facility]  DATE: 6/26/2025    INDICATION: ams  COMPARISON: 11/3/2024 head CT.  TECHNIQUE: Routine CT Head without IV contrast. Multiplanar reformats. Dose reduction techniques were used.    FINDINGS:  INTRACRANIAL CONTENTS: No intracranial hemorrhage, extraaxial collection, or mass effect.  No CT evidence of acute infarct. Mild presumed chronic small vessel ischemic changes. Mild generalized volume loss. No hydrocephalus.     VISUALIZED ORBITS/SINUSES/MASTOIDS: No intraorbital abnormality. No paranasal sinus mucosal disease. No middle ear or mastoid effusion.    BONES/SOFT TISSUES: No acute abnormality.      Impression    IMPRESSION:  1.  Age-related changes as above with no acute intracranial abnormality.     CT Abdomen Pelvis w Contrast    Narrative    EXAM: CT ABDOMEN PELVIS W CONTRAST  LOCATION: [facility]  DATE: 6/26/2025    INDICATION: New abdominal pain and hypotension.  COMPARISON: CT chest abdomen and pelvis 11/3/2024.  TECHNIQUE: CT scan of the abdomen and pelvis was performed following injection of IV contrast. Multiplanar reformats were obtained. Dose reduction techniques were used.  CONTRAST: 103 mL Isovue 370    FINDINGS:     LOWER CHEST: Bilateral gynecomastia. Chronic small right pleural effusion with associated pleural thickening and calcifications. Right lower lobe calcified granuloma and chronic right lower lobe rounded atelectasis appear similar. Trace left pleural   effusion with adjacent atelectasis. Cardiomegaly.    HEPATOBILIARY: Status post liver transplant. Lobulated contours of the transplanted liver appear similar. A tiny left hepatic cyst is unchanged. No new liver lesions. No biliary ductal dilation.    PANCREAS: A 0.8 cm cystic lesion within the pancreatic body is unchanged (3/#78). No dilation of the main pancreatic duct.    SPLEEN: Splenomegaly measuring 18.3 cm.    ADRENAL GLANDS: Normal.    KIDNEYS/BLADDER: Atrophic kidneys. Nonobstructing right upper pole renal calculus measuring 0.5 cm. Multiple nonobstructing left renal calculi measuring up to 0.3 cm. Multiple subcentimeter hypodense renal lesions which are too small to characterize and   do not require specific follow-up. No hydronephrosis. Bladder is decompressed.    BOWEL: No evidence of bowel obstruction or inflammation. Appendix is absent.    PERITONEUM/RETROPERITONEUM: Moderate to large amount of ascites.     LYMPH NODES: No enlarged lymph node. Scattered prominent lymph nodes are present within the central mesentery are likely reactive in nature.    VASCULATURE: Patent portal, splenic, and superior mesenteric veins. Mild aortobiiliac atherosclerosis. No abdominal aortic aneurysm.    PELVIC ORGANS: Normal.    MUSCULOSKELETAL: Large left inguinal hernia containing ascites. Shallow lower ventral abdominal wall hernias  containing ascites. Moderate midline upper abdominal wall hernia containing ascites and a small amount of fat. Multiple old healed rib fractures.   Multilevel degenerative change of the spine. Unchanged compression deformities of the L1 and T10 vertebral bodies. No acute bony abnormality.      Impression    IMPRESSION:     1.  No acute process in the abdomen or pelvis.    2.  Moderate to large amount of ascites.    3.  Trace left pleural effusion with adjacent atelectasis. Chronic small right pleural effusion with associated pleural thickening and adjacent rounded atelectasis.    4.  Atrophic kidneys with multiple bilateral nonobstructing intrarenal calculi. No hydronephrosis.    5.  Additional nonemergent findings as described in the report body.

## 2025-06-27 NOTE — H&P
MEDICAL ICU H&P  06/27/2025    Date of Hospital Admission: 6/26/2025 ([facility])   Date of ICU Admission: 6/27/2025   Reason for Critical Care Admission: Hypotension requiring pressors   Date of Service (when I saw the patient): 06/27/2025    ASSESSMENT:   Blanco Osborne is a 61 yo male with Pmhx ESRD (on HD MWF since 1/23), NAFLD s/p liver transplant (9/2016), PEA arrest 2/2 hypoxic respiratory failure (2022), pAF, CVA, HLD, GERD, CHRISTIAN on CPAP, RLS, and hx seizures. Presented to [facility] ED for hypoxia and AMS. Appeared to be volume overloaded on ED exam, subsequently admitted to [facility] 6/26 for HD. However after HD pt was persistently hypotensive despite midodrine, therefore started on pressors and transferred to [facility] given bed availability. Treating for shock 2/2 presumed infection, also obtaining CT PE.         CHANGES and MAJOR THINGS TODAY:   - MAP goal >65: norepi, midodrine  - CT PE  - Hepatology, nephrology consulted  - HAP/SBP txt: zosyn, vanc  - CAP consult for paracentesis   - giving albumin 25% for additional volume      PLAN:     Neuro:  #AMS  #Lethargy   Pt [facility] course review, pt quite lethargic. On interview, pt tired, sometimes with wandering thought processes but overall A&Ox3. Suspect current AMS/lethargy in setting of toxic metabolic encephalopathy 2/2 infection. Less likely intracranial pathology given negative CT head + neuro exam nonfocal Less likely metabolic given overall normal BMP + recent UF, also ammonia wnl. Does have hx of seizure, but no reported events per chart (not able to reach family to ask outright), does not appear postictal, WBC and lactate wnl (1.4).   - TSH    - lacosamide level   - ethyl alcohol level (while no history of active use, some notes point out alcohol use post transplant. AST 3x the level of ALT as well, plus evidence of fibrosis and hepatitis on liver biopsy)   - infection txt as below      #Hx seizures  Follows with neurology outpatient:  "last appt was 10/24. He had a seizure episode in 2022 in setting of admission for septic shock; prolonged video EEG was performed 12/20-23/2022 at [facility]. The patient had 2 seizures during this monitoring. Reportedly sx were gaze deviation, facial twitching and grimacing. He was started on lacosamide and levetiracetam- levetiracetam has since been tapered off.   - Continue lacosamide 50mg daily (of note, [facility] note states that pt takes every other day as he is being \"weaned off\" by his neurologist\". Per 10/24 note review, neurology advised pt to stay on this medication).   - Lacosamide level      #Hx CVA  His MRI 12/16/2022 showed chronic right parietal encephalomalacia and multiple lacunar strokes in the left frontal, left temporal and posterior right frontal lobe, consistent with embolic strokes. No gross deficits on exam.   - PTA ASA 81mg       #RLS  - PTA ropinirole 0.5mg BID     Pulmonary:  #AHRF  #Bilateral pleural effusions (Trace L, chronic small R)   Presented to [facility] with hypoxia, new O2 requirement. BNP elevated compared to prior (18.3k compared to 4.3k 4 days prior). CXR with pulmonary vascular congestion. CT AP showed trace left pleural effusion with adjacent atelectasis, chronic small right pleural effusion with associated pleural thickening and adjacent rounded atelectasis. Underwent UF with 1.5L off with subsequent improvement in O2 needs (4L --> 2L).   No fever, elevated WBC or left shift (though notably immunosuppressed in setting of transplant). Lactic acid wnl (1.4). No report of cough. However, given report of hypoxia and subsequent hypotension with HD, concern for respiratory infection- HAP given history of hospitalizations. PE also a possibility.   Plan:  - CT PE  - HAP txt: zosyn, vanc  - Sputum cx  - Holding of legionella/strep eval; pt anuric   - NC for O2 support, keep sat > 92%    #Pulmonary embolism diagnosed 10/5/2023  - PTA on warfarin   - heparin gtt while inpt     #CHRISTIAN  Unclear " if uses CPAP at home.      Cardiovascular:  #Hypotension   #Hx chronic hypotension  Pt has ongoing concerns with chronic hypotension: typically takes midodrine on dialysis days and sometime he takes extra doses as needed for symptoms. His average systolic BP is 80s-90s and he can feels symptomatic with BP in the 80s. Notably after UF 6/26 with persistently low Bps despite 50mg midodrine total- required norepi initiation. No fever, elevated WBC or left shift (though notably immunosuppressed in setting of transplant). Lactic acid wnl.   Ddx remains broad: leading thought is SBP given report of abdominal pain, presence of ascites on imaging, history of SBP. Additionally considering HAP given hypoxia. Another potenial is PE- he has a history of PE in 2023, and appears to be noncompliant with warfarin (INR is 1.5, family reports he is not taking). Less likely bleeding though this is possibility given Hgb 9 --> 7- no obvious bleeding noted on exam. He recently had a stress test that had good LVEF so less likely cardiogenic (also warm and well-perfused on exam).   Plan:  - Workup: CT PE ordered  - MAP goal >65               - norepi gtt  - continue PTA midodrine   - 10mg TID + additional 20mg MWF before HD  - Abx:               - Ceftriaxone (6/26)    - Zosyn 6/27- present   - Vancomycin 6/27- present  - Cultures:               - Blood cultures from [facility] in process  - Plan for paracentesis- CAP team consulted      #Hx of pericardial effusion 2023, s/p pericardiocentesis x 2 in 9/23 + 10/23   Echocardiogram in March 2023 showed normal LVEF, no significant valve disease, no obvious vegetations, but with note of a small posterior pericardial effusion without tamponade. Pt was followed with many serial echocardiograms since the initial effusion was discovered. With this, the effusion continued to develop, and eventually he returned to [facility] for pericardiocentesis on 9/29/23 & another one in Oct 2023. Felt to be due to  volume overload due to renal failure   - ECHO in AM (appears to have some effusion on CT AP from [facility])     #CAD  Of note, underwent dobutamine stress test 4/25 which was negative for myocardial ischemia. The left ventricular ejection fraction increased from 65% at rest to 75% at peak stress. His RV was mildly dysfunctional with the RVSP was just 26 mmHg and CVP was 5. There was no significant valvulopathy.   - PTA ASA 81     #Hx paroxysmal Afib  Currently in the work up for a watchman. 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 He has had ascites now several years requiring intermittent paracenteses.   Per report, pt has a number of features consistent with clinically significant portal hypertension. Repeat pressure measurements showed HVPG of 12-13 mmHg. Review of imaging shows evidence of abdominal varices, splenomegaly, and a nodular-appearing liver with ascites. Total protein in ascitic fluid has varied to greater than or less than 2.5 with SAAG >1.1. Prior colonoscopy did show rectal varices, per report. He has not had a recent upper endoscopy. Portal and hepatic veins are patent. Notably, he had a liver biopsy in our system 4/09/2025 showed severe hepatitis with many plasma cells, activity grade 3-4 /4; likely mild fibrosis. Hepatology suspects that NRH is contributing factor to his underlying portal hypertension- this along with his ESRD and difficulty pulling fluid during dialysis all  contributing to his ascites.  - Hepatology consulted, appreciate recs  - Continue PTA tacrolimus: 1mg BID   - tacro level in AM  - SBP management: s/p 1 dose ceftriaxone, continuing with zosyn given c/f HAP as well    - Has hx SBP: last culture data visible is in 2022, lactobacillus, notably not on prophylactic cipro   - CAPS consult for paracentesis   - ethyl alcohol level (while no history of active use, some notes point out alcohol use post transplant. AST 3x the level of ALT as well, plus evidence of fibrosis and hepatitis on liver biopsy)     #GERD  - PTA PPI    #Hernias  He has a left large inguinal hernia, a right small inguinal hernia, an umbilical hernia, and a superior ventral hernia. No surgical interventions planned given high risk of recurrence and post-op complications if attempted to repair.      Renal/Fluids/Electrolytes:  #ESRD on HD  #Hypotension with HD  Pt has required hemodialysis since 11/2022 due to an acute kidney injury during a hospitalization for respiratory failure and sepsis. Per review, HD is difficult for him to handle given hypotension- requires midodrine with HD.   Currently being evaluated at [facility] for kidney transplant (though key question, given poor hepatic function, is whether he should proceed with kidney transplant alone).   Dry weight list as 89.5kg- current weight recorded is 93.1kg.   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 He then underwent fistulogram with IR 6/23 with large thrombus noted and ultimately inability to keep fistula open. Subsequently tunnel cath placed via IR 6/23 & he underwent thrombectomy with repair of fistula aneurysms with Dr Mcneil 6/24. Discharged with plan for scheduled HD on 6/27.    - Tunneled HD line in place     Endocrine:  No acute concerns.      ID:  #C/f infection: HAP, SBP  Presented with hypoxia and subsequent hypotension after HD. Abdomen tender, along with history of SBP concerning for new peritoneal infection. Given history of hypoxia and multiple hospitalizations preceding this one, concern for HAP as well.  - s/p ceftriaxone dose in [facility]  - Abx:   - zosyn 6/27- present   - vancomycin 6/27- present  - Cultures   - Bcx 6/26 ([facility]): in process   - sputum culture  - Anuric, so holding off Legionella/strep urine testing      Hematology:    #Chronic macrocytic anemia   Per review, baseline Hgb around 8-9- likely in setting of anemia of renal disease. On admission, Hgb lower at 7.6. No s/s bleeding.   - Daily Hgb checks  - Transfuse for Hgb <7  - EPO per nephrology  - Checking B12, folate given elevated MCV     #Chronic thrombocytopenia   Likely in setting of liver dysfunction.   - CTM      Musculoskeletal:  #Weakness/deconditioning   - PT/OT     Skin:  No acute concerns     General Cares/Prophylaxis:    DVT Prophylaxis: heparin gtt   GI Prophylaxis: PPI  Restraints: None  Family Communication: Wife Ofe; called, no answer, will try again in AM  Code Status: FULL    Lines/tubes/drains:  - HD tunneled line places 6/23  - PIV 6/26    Disposition:  - Medical ICU     Patient seen and findings/plan discussed with medical ICU staff, Dr. oJnes.    Rashida Olivia MD  Internal Medicine, PGY2  MICU service       Clinically Significant Risk Factors Present on Admission         # Hyponatremia: Lowest Na = 131 mmol/L in last 2 days, will monitor as appropriate  # Hypochloremia: Lowest Cl = 91 mmol/L in last 2 days, will monitor as appropriate  # Hypocalcemia: Lowest Ca = 8.5 mg/dL in last 2 days, will monitor and replace as appropriate      # Drug Induced Coagulation Defect: home medication list includes an anticoagulant medication  # Drug Induced Platelet Defect: home medication list  "includes an antiplatelet medication   # Hypertension: Noted on problem list      # Anemia: based on hgb <11       # Overweight: Estimated body mass index is 27.97 kg/m  as calculated from the following:    Height as of 6/24/25: 1.829 m (6').    Weight as of 6/24/25: 93.5 kg (206 lb 3.2 oz).       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 Presented to [facility] ED for hypoxia and AMS.      Pt was very recently hospitalized at [facility] from 6/22-6/25 for transient hypoxemia in setting of missed HD- he was unable to get HD run as fistula not funtioning. He then underwent fistulogram with IR 6/23 with large thrombus noted and ultimately inability to keep fistula open. Subsequently tunnel cath placed via IR 6/23 & he underwent thrombectomy with repair of fistula aneurysms with Dr Mcneil 6/24. Discharged with plan for scheduled HD on 6/27.      Pt was BIBA to [facility] ED after family found him hypoxic 6/26 AM- O2 sats in 60s on home monitor. Per note review, \"His wife states that the patient has not been breathing well since last night and his lips were pale. The patient also has weakness and couldn't stand up\". EMS confirmed low O2 sats, placed pt on 4L NC. Pt was also hypotensive for EMS with systolics in 80-90s, therefore given 500ml fluids and 4mg zofran for emesis en route.      [facility] ED course:  - Vitals on arrival: temp 98.5F, BP 95/62, HR 77, RR 20, satting 93% on 4L NC  - Bedside echo showing decent squeeze with no pericardial effusion.   - BMP: Na 132, K 4.2, bicarb 26, AG 15, Cr 5.65, Ca 8.7, glucose 192  - Hepatic panel: Alk phos 208 (stable from previous), " AST newly elevated at 147, ALT 54, total bili 1.0  - CBC: WBC 9.2, Hgb 7.6 (overall within baseline), Plt 105. Differential wnl.   - INR: 1.47, PT 17.4  - Istat gas: lactate 1.4, ph 7.30, CO2 59  - BNP: 18,384; previously 4300 5 days prior   - CXR: enlarged cardiac silhouette (stable from prior). There is pulmonary vascular congestion with small bilateral pleural effusions, right greater than left. No lobar airspace consolidation or pneumothorax. No acute bony abnormality.   - Admitted for HD     [facility] course:  - Underwent HD - UF only with a net fluid removal of  1.5L. Meds given: Albumin 5% prime, Midodrine 30 mg, Midodrine 10 mg   - After dialysis patient's SBP persistently into the 70s despite his midodrine. He remained lethargic. Pt reported new onset abdominal pain with guarding noted on deep palpation.  - CT head: Age-related changes as above with no acute intracranial abnormality.   - CT AP: No acute process in the abdomen or pelvis, moderate to large amt ascites, Trace left pleural effusion with adjacent atelectasis. Chronic small right pleural effusion with associated pleural thickening and adjacent rounded atelectasis, atrophic kidneys (no hydronephrosis).  - Ammonia: 43  - Bcx collected   - Started on peripheral levophed  - Started on Ceftrixaone  - Transferred to [facility] given no beds available at [facility]      On interview: pt reports that he has been feeling poorly for a few days, especially more exhausted since being evaluated in [place] for renal transplant. Reports that he felt tired during dialysis. Abdominal pian ongoing for a few days as well, diffuse in nature. No cough, fever, chills, chest pain, or difficulty breathing reported.        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    Cancer (H) 2023    Squamous Cell Cancer- Since resolved    Cirrhosis of liver with ascites (H) 02/11/2016    Coagulopathy     Critical illness myopathy     Dialysis patient     DIALYSIS 3X/ WEEK    Embolic stroke (H) 11/20/2022    Encephalopathy     ESRD (end stage renal disease) on dialysis (H)     Gastroesophageal reflux disease     H/O alcohol abuse     History of blood transfusion     Hyperammonemia     Hyperlipidemia     Hypertension     Patients states that HTN has been resolved    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    Insomnia     Lactic acidosis 11/12/2022    Liver replaced by transplant (H) 09/20/2016    NAFLD (nonalcoholic fatty liver disease) 02/11/2016    CHRISTIAN on CPAP     Parainfluenza type 1 infection 11/19/2022    Parapneumonic effusion     Pleural effusion     Pneumothorax on left 12/16/2022    Respiratory acidosis 11/12/2022    Respiratory arrest (H) 11/12/2022    Restless legs syndrome (RLS)     SBP (spontaneous bacterial peritonitis) (H)     [facility]    Seizures (H) 12/2022    Sepsis due to Streptococcus pneumoniae with acute hypoxic respiratory failure (H) 11/19/2022    Stroke, embolic (H) 11/20/2022    Sudden cardiac arrest (H) 12/29/2022    PEA- 2 min of CPR    Tubular adenoma 10/2019    Large cecal adenoma- due for surveillance colonoscopy in 3 years (10/2022)     SURGICAL HISTORY:  Past Surgical History:   Procedure Laterality Date    APPENDECTOMY      BENCH LIVER N/A 09/20/2016    Procedure: BENCH LIVER;  Surgeon: Enoc Crews MD;  Location: [facility] OR    BRONCHOSCOPY FLEXIBLE AND RIGID N/A 12/20/2022    Procedure: BRONCHOSCOPY;  Surgeon: Alena Valenzuela MD;  Location:  GI    COLONOSCOPY  08/06/2013    repeat in 2018    COLONOSCOPY N/A 10/04/2019    Procedure: COLONOSCOPY, WITH POLYPECTOMY AND BIOPSY;  Surgeon: Go Chong MD;  Location: [facility] OR    COLONOSCOPY N/A 3/26/2024    Procedure: COLONOSCOPY,  FLEXIBLE, WITH LESION REMOVAL USING SNARE;  Surgeon: Jie Douglas MD;  Location:  GI    CREATE FISTULA ARTERIOVENOUS UPPER EXTREMITY Left 03/21/2023    Procedure: LEFT UPPER EXTREMITY ARTERIOVENOUS FISTULA CREATION. LIGATION OF COMPETING VASCULAR BRANCHES- LEFT;  Surgeon: Deejay Mcneil MD;  Location:  OR    CV CORONARY ANGIOGRAM N/A 2/22/2024    Procedure: Coronary Angiogram;  Surgeon: Nima Dimas MD;  Location:  HEART CARDIAC CATH LAB    CV PERICARDIOCENTESIS N/A 9/29/2023    Procedure: Pericardiocentesis;  Surgeon: Barry Mckeon MD;  Location:  HEART CARDIAC CATH LAB    CV PERICARDIOCENTESIS N/A 10/11/2023    Procedure: Pericardiocentesis;  Surgeon: Ulises Bhakta MD;  Location:  HEART CARDIAC CATH LAB    CV RIGHT HEART CATH MEASUREMENTS RECORDED N/A 10/11/2023    Procedure: Right Heart Catheterization;  Surgeon: Ulises Bhakta MD;  Location:  HEART CARDIAC CATH LAB    HERNIA REPAIR      IR CHEST TUBE PLACEMENT NON-TUNNELED RIGHT  12/12/2022    IR CVC TUNNEL PLACEMENT > 5 YRS OF AGE  12/06/2022    IR CVC TUNNEL PLACEMENT > 5 YRS OF AGE  1/3/2024    IR CVC TUNNEL PLACEMENT > 5 YRS OF AGE  6/23/2025    IR DIALYSIS FISTULOGRAM LEFT  07/13/2023    IR DIALYSIS FISTULOGRAM LEFT  6/23/2025    IR GASTROSTOMY TUBE CHANGE  02/08/2023    IR GASTROSTOMY TUBE PERCUTANEOUS PLCMNT  11/29/2022    IR PARACENTESIS  11/29/2022    IR PARACENTESIS  12/01/2022    IR PARACENTESIS  2/10/2016    IR PARACENTESIS  2/28/2016    IR THORACENTESIS  12/06/2022    IR THORACENTESIS  09/01/2020    IR THORACENTESIS  08/10/2020    IR TRANSCATHETER BIOPSY  12/01/2022    IR TRANSCATHETER BIOPSY  4/9/2024    REVISION FISTULA ARTERIOVENOUS UPPER EXTREMITY Left 8/15/2023    Procedure: 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 2; OUTFLOW REVISION CEPHALIC TO BRACHIAL VEIN;  Surgeon: Deejay Mcneil MD;  Location:  OR    REVISION FISTULA ARTERIOVENOUS UPPER EXTREMITY Left 1/3/2024     Procedure: Excision and repair of LEFT brachiocephalic arteriovenous fistula and vein patch angioplasty;  Surgeon: Deejay Mcneil MD;  Location: [facility] OR    THROMBECTOMY UPPER EXTREMITY Left 2025    Procedure: Thrombectomy upper extremity, repair of aneurysm, left upper arm;  Surgeon: Deejay Mcneil MD;  Location: [facility] OR    TRACHEOSTOMY N/A 2022    Procedure: TRACHEOSTOMY;  Surgeon: Nilesh Jackson MD;  Location: [facility] OR    TRANSPLANT LIVER RECIPIENT  DONOR N/A 2016    Procedure: TRANSPLANT LIVER RECIPIENT  DONOR;  Surgeon: Enoc Crews MD;  Location: [facility] OR     SOCIAL HISTORY:  Social History     Socioeconomic History    Marital status:    Tobacco Use    Smoking status: Never     Passive exposure: Never    Smokeless tobacco: Never   Vaping Use    Vaping status: Never Used   Substance and Sexual Activity    Alcohol use: Not Currently     Alcohol/week: 0.0 standard drinks of alcohol    Drug use: No    Sexual activity: Not Currently     Partners: Female     Social Drivers of Health     Financial Resource Strain: Low Risk  (2024)    Financial Resource Strain     Within the past 12 months, have you or your family members you live with been unable to get utilities (heat, electricity) when it was really needed?: No   Food Insecurity: No Food Insecurity (2025)    Received from [facility]    Hunger Vital Sign     Worried About Running Out of Food in the Last Year: Never true     Ran Out of Food in the Last Year: Never true   Transportation Needs: No Transportation Needs (2025)    Received from [facility]    PRAPARE - Transportation     Lack of Transportation (Medical): No     Lack of Transportation (Non-Medical): No   Physical Activity: Insufficiently Active (2025)    Received from [facility]    Exercise Vital Sign     Days of Exercise per Week: 2 days     Minutes of Exercise per Session: 10 min   Interpersonal Safety: Low Risk   (11/19/2024)    Interpersonal Safety     Do you feel physically and emotionally safe where you currently live?: Yes     Within the past 12 months, have you been hit, slapped, kicked or otherwise physically hurt by someone?: No     Within the past 12 months, have you been humiliated or emotionally abused in other ways by your partner or ex-partner?: No   Housing Stability: Low Risk  (4/18/2025)    Received from [facility]    Housing Stability     What is your living situation today?: I have a steady place to live     FAMILY HISTORY:   Family History   Problem Relation Age of Onset    Coronary Artery Disease No family hx of     Cardiomyopathy No family hx of     Anesthesia Reaction No family hx of     Deep Vein Thrombosis (DVT) No family hx of      ALLERGIES:   No Known Allergies  MEDICATIONS:  Current Facility-Administered Medications   Medication Dose Route Frequency Provider Last Rate Last Admin    acetaminophen (TYLENOL) tablet 650 mg  650 mg Oral Q8H PRN Rashida Olivia MD        aspirin EC tablet 81 mg  81 mg Oral Daily Rashida Olivia MD        calcium carbonate (TUMS) chewable tablet 500 mg  500 mg Oral 4x Daily PRN Rashida Olivia MD        glucose gel 15-30 g  15-30 g Oral Q15 Min PRN Rashida Olivia MD        Or    dextrose 50 % injection 25-50 mL  25-50 mL Intravenous Q15 Min PRN Rashida Olivia MD        Or    glucagon injection 1 mg  1 mg Subcutaneous Q15 Min PRN Rashida Olivia MD        gabapentin (NEURONTIN) capsule 300 mg  300 mg Oral At Bedtime PRN Rashida Olivia MD        midodrine (PROAMATINE) tablet 10 mg  10 mg Oral TID w/meals Rashida Olivia MD        And    midodrine (PROAMATINE) tablet 20 mg  20 mg Oral Q Mon Wed Fri AM aRshida Olivia MD        norepinephrine (LEVOPHED) 4 mg in  mL PERIPHERAL infusion  0.01-0.2 mcg/kg/min Intravenous Continuous Rashida Olivia MD        ondansetron (ZOFRAN ODT) ODT tab 4 mg  4 mg Oral Q6H PRN Rashida Olivia MD        Or    ondansetron  (ZOFRAN) injection 4 mg  4 mg Intravenous Q6H PRN Rashida Olivia MD        oxyCODONE (ROXICODONE) tablet 5 mg  5 mg Oral Q6H PRN Rashida Olivia MD        Patient is already receiving anticoagulation with heparin, enoxaparin (LOVENOX), warfarin (COUMADIN)  or other anticoagulant medication   Does not apply Continuous PRN Rashida Olivia MD        polyethylene glycol (MIRALAX) Packet 17 g  17 g Oral Daily PRN Rashida Olivia MD        senna-docusate (SENOKOT-S/PERICOLACE) 8.6-50 MG per tablet 1 tablet  1 tablet Oral BID PRN Rashida Olivia MD        Or    senna-docusate (SENOKOT-S/PERICOLACE) 8.6-50 MG per tablet 2 tablet  2 tablet Oral BID PRN Rashida Olivia MD        tacrolimus (GENERIC EQUIVALENT) capsule 1 mg  1 mg Oral Q12H Rashida Olivia MD           PHYSICAL EXAMINATION:  Temp:  [97.9  F (36.6  C)-98.5  F (36.9  C)] 98  F (36.7  C)  Pulse:  [] 64  Resp:  [11-34] 11  BP: ()/(41-85) 98/65  SpO2:  [79 %-100 %] 97 %  General: no acute distress, appears tired  HEENT: NC/AT  Neuro: A&Ox3 but occasionally wandering thought process, no focal deficits, moves all extremities   Pulm/Resp: Diminished breath sounds in bases bilaterally, mild crackles auscultated bilaterally   CV: RRR  Abdomen: Soft, non-distended, mild diffuse tenderness throughout   Incisions/Skin: no acute wounds/rashes noted     LABS: Reviewed.   Complete Blood Count   Recent Labs   Lab 06/26/25  1302 06/25/25  0713 06/24/25  0650 06/22/25 2004   WBC 9.2  --  9.2 4.1   HGB 7.6* 7.3* 8.8* 9.4*   *  --  86* 77*     Basic Metabolic Panel  Recent Labs   Lab 06/26/25  1302 06/25/25  0713 06/24/25  0650 06/23/25  0621   * 131* 135 133*   POTASSIUM 4.2 4.3 4.9 4.6   CHLORIDE 91* 91* 94* 96*   CO2 26 24 23 21*   BUN 25.4* 46.4* 36.0* 66.2*   CR 5.64* 7.96* 6.73* 10.79*   * 130* 103* 98     Liver Function Tests  Recent Labs   Lab 06/26/25  1302 06/25/25  0713 06/24/25  0650 06/23/25  1047   *  --   --   --    ALT  54  --   --   --    ALKPHOS 208*  --   --   --    BILITOTAL 1.0  --   --   --    ALBUMIN 4.0  --   --   --    INR 1.47* 1.27* 1.49* 1.31*     Coagulation Profile  Recent Labs   Lab 06/26/25  1302 06/25/25  0713 06/24/25  0650 06/23/25  1047   INR 1.47* 1.27* 1.49* 1.31*       IMAGING:  Recent Results (from the past 24 hours)   XR Chest 2 Views    Narrative    EXAM: XR CHEST 2 VIEWS  LOCATION: [facility]  DATE: 6/26/2025    INDICATION: Shortness of breath  COMPARISON: Chest radiograph 6/22/2025      Impression    IMPRESSION: Cardiac silhouette is enlarged, not significantly changed from prior exam. Tunneled dialysis catheter with tip overlying the right atrium. There is pulmonary vascular congestion with small bilateral pleural effusions, right greater than left.   No lobar airspace consolidation or pneumothorax. No acute bony abnormality.   CT Head w/o Contrast    Narrative    EXAM: CT HEAD W/O CONTRAST  LOCATION: [facility]  DATE: 6/26/2025    INDICATION: ams  COMPARISON: 11/3/2024 head CT.  TECHNIQUE: Routine CT Head without IV contrast. Multiplanar reformats. Dose reduction techniques were used.    FINDINGS:  INTRACRANIAL CONTENTS: No intracranial hemorrhage, extraaxial collection, or mass effect.  No CT evidence of acute infarct. Mild presumed chronic small vessel ischemic changes. Mild generalized volume loss. No hydrocephalus.     VISUALIZED ORBITS/SINUSES/MASTOIDS: No intraorbital abnormality. No paranasal sinus mucosal disease. No middle ear or mastoid effusion.    BONES/SOFT TISSUES: No acute abnormality.      Impression    IMPRESSION:  1.  Age-related changes as above with no acute intracranial abnormality.     CT Abdomen Pelvis w Contrast    Narrative    EXAM: CT ABDOMEN PELVIS W CONTRAST  LOCATION: [facility]  DATE: 6/26/2025    INDICATION: New abdominal pain and hypotension.  COMPARISON: CT chest abdomen and pelvis  11/3/2024.  TECHNIQUE: CT scan of the abdomen and pelvis was performed following injection of IV contrast. Multiplanar reformats were obtained. Dose reduction techniques were used.  CONTRAST: 103 mL Isovue 370    FINDINGS:     LOWER CHEST: Bilateral gynecomastia. Chronic small right pleural effusion with associated pleural thickening and calcifications. Right lower lobe calcified granuloma and chronic right lower lobe rounded atelectasis appear similar. Trace left pleural   effusion with adjacent atelectasis. Cardiomegaly.    HEPATOBILIARY: Status post liver transplant. Lobulated contours of the transplanted liver appear similar. A tiny left hepatic cyst is unchanged. No new liver lesions. No biliary ductal dilation.    PANCREAS: A 0.8 cm cystic lesion within the pancreatic body is unchanged (3/#78). No dilation of the main pancreatic duct.    SPLEEN: Splenomegaly measuring 18.3 cm.    ADRENAL GLANDS: Normal.    KIDNEYS/BLADDER: Atrophic kidneys. Nonobstructing right upper pole renal calculus measuring 0.5 cm. Multiple nonobstructing left renal calculi measuring up to 0.3 cm. Multiple subcentimeter hypodense renal lesions which are too small to characterize and   do not require specific follow-up. No hydronephrosis. Bladder is decompressed.    BOWEL: No evidence of bowel obstruction or inflammation. Appendix is absent.    PERITONEUM/RETROPERITONEUM: Moderate to large amount of ascites.     LYMPH NODES: No enlarged lymph node. Scattered prominent lymph nodes are present within the central mesentery are likely reactive in nature.    VASCULATURE: Patent portal, splenic, and superior mesenteric veins. Mild aortobiiliac atherosclerosis. No abdominal aortic aneurysm.    PELVIC ORGANS: Normal.    MUSCULOSKELETAL: Large left inguinal hernia containing ascites. Shallow lower ventral abdominal wall hernias containing ascites. Moderate midline upper abdominal wall hernia containing ascites and a small amount of fat. Multiple  old healed rib fractures.   Multilevel degenerative change of the spine. Unchanged compression deformities of the L1 and T10 vertebral bodies. No acute bony abnormality.      Impression    IMPRESSION:     1.  No acute process in the abdomen or pelvis.    2.  Moderate to large amount of ascites.    3.  Trace left pleural effusion with adjacent atelectasis. Chronic small right pleural effusion with associated pleural thickening and adjacent rounded atelectasis.    4.  Atrophic kidneys with multiple bilateral nonobstructing intrarenal calculi. No hydronephrosis.    5.  Additional nonemergent findings as described in the report body.

## 2025-06-27 NOTE — PROGRESS NOTES
Physical Therapy: Orders received. Chart reviewed and discussed with care team.? Physical Therapy not indicated due to lack of mobility deficits. Pt ambulating in hallway with up to SBA.? Defer discharge recommendations to OT.? Will complete orders.

## 2025-06-27 NOTE — CONSULTS
HEPATOLOGY CONSULTATION    Date of Admission:  6/27/2025           Reason for Consultation:   We were asked by Dr. Jones of [facility] to evaluate this patient post-transplant.          ASSESSMENT AND RECOMMENDATIONS:   Assessment:  60 year old male with a history of liver transplant for metALD cirrhosis (2016) who has end stage renal disease on HD listed for renal transplant, atrial fibrillation on warfarin, seizure disorder, CHRISTIAN, who presented due to hypotension during dialysis requiring pressors and acute hypoxemic respiratory failure found to have pulmonary embolism.      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 He has a history of difficulty with adherence to medications as well alcohol use post-transplant. The etiology of his allograft dysfunction is not entirely clear and is likely multifactorial. Liver biopsy 2025 is showing evidence of steatohepatitis (NORIS 3/8), nodular regenerative hyperplasia, sinusoidal dilation (without thrombosis of hepatic veins suggesting ?cardiopulmonary etiology or congestion due to volume overload due to not tolerating HD), and is indeterminate for rejection (JEFFERSON 3/9). He is undergoing simultaneous liver kidney transplant evaluation at [facility].     His MELD is driven by his renal disease and elevated INR in the setting of warfarin use. He does not need any additional inpatient workup for this, can continue current immunosuppression and follow up outpatient.      Acute pulmonary embolism on anticoagulation   Pulmonary hypertension  He developed PE on warfarin - however, this may be because he had held  warfarin intermittently due to fistula issues and need for procedures. He does have suggestion of portal hypertension on cardiac CT 6/12 and on echocardiogram which will need to be evaluated.     End stage renal disease listed for renal transplant   In the setting of PEA arrest in 2022 due to pneumonia, has had issues with his AV fistula. Currently has HD line for dialysis.     Acute encephalopathy   This is likely multifactorial given renal dysfunction, infectious workup pending, and is not clearly hepatic in etiology.      Recommendations:   - Diagnostic paracentesis (cell count, differential, culture, protein, albumin cytology)  - Follow up blood, urine, paracentesis cultures   - Age appropriate malignancy screening given venous thromboses   - Management of PE per primary team   - Continue tacrolimus 1 mg BID  - Follow up tacrolimus level (goal 5-7)  - PETH (ordered)  - Hepatology will continue to follow     Pt care plan discussed with attending physician, Dr. Crystal.     Stephen Kerr MD  Gastroenterology Fellow         History of Present Illness:   Blanco Osborne is a 60 year old male with a history of liver transplant for metabolic associated steatohepatitis, PEA arrest in the setting of pneumonia in 2022 leading to end-stage renal disease, CVA on warfarin who is now on outpatient dialysis not tolerating very well.    He has had issues with his fistula for dialysis clotting.  Due to need for revisions of this, he had to hold his warfarin over the last few weeks.  He then presented with acute encephalopathy and hypoxemia as well as need for pressors with dialysis.  He is currently admitted to the medical ICU.  His brother and wife are at bedside and contribute to history.  CT showing pulmonary embolism and patient is being treated for this as well as for hypotension.    Per review of chart, he developed ascites and evidence of portal hypertension in 2023. Patient has been following outpatient hepatology  "with Dr. Simms, last seen in December 2024.  He has history of medication nonadherence lack of follow-up for labs.  He had also been lost to follow-up posttransplant for 3 years in [id number].  Most recently his liver biopsy at [facility] in 4/2024 showed question of rejection with severe hepatitis with many plasma cells concerning for hepatitic rejection.      He was approved for kidney transplant is currently listed at [facility].  He then has been evaluated also at [place] and was seen by transplant hepatology there.  He underwent extensive workup for simultaneous liver kidney transplant.     CT angiography showed hypoattenuation along the wall of the left atrial appendage which could represent mural thrombus or prominent myocardium, pulmonary artery dilation which can be seen in pulmonary hypertension.  IR trans jugular liver biopsy showed hepatic venous pressure gradient 12 to 13 mmHg.  There was no outflow obstruction noted.  The liver biopsy 5/20/2025 is showing indeterminate to mild rejection, mild macrovascular steatosis, centrilobular and periportal fibrosis, sinusoidal dilation, nodular regenerative hyperplasia.    Patient denies alcohol use, has had occasional alcohol use following liver transplant.         Data:   Key relevant labs:   Na 130, Cr 6.15  , ALT 37, , Tbili 0.8  BNP 37951    WBC 9.2 Hgb 7.4, Plt 110   INR 1.47     Liver biopsy 5/2025  \"FINAL DIAGNOSIS   Liver, needle biopsy:   - Indeterminate to mild T cell -mediated rejection arising   in a background of steatohepatitis with moderate activity   and areas of periportaland perivenular pericellular   fibrosis with focal fibrous bridging (see comment)   - Areas of centrilobular sinusoidal dilatation and   congestion suggestive of venous out flow impairment   - Alternating areas of hepatocyte atrophy and hypertrophy   consistent with nodular regenerative hyperplasia in the   appropriate clinical setting\"     Key relevant imaging:  CTPE " "6/26  \"IMPRESSION:   1. Exam is positive for acute pulmonary embolism. Pulmonary emboli in  posterior segmental and subsegmental arterial branches in the left  upper lobe. No evidence of right heart strain.   2. Dense consolidation of the right lower lobe, likely atelectasis but  infection cannot be entirely excluded.  3. Scattered patchy consolidative opacities in the mid to lower right  lung, suggestive of an infectious and/or inflammatory process versus  aspiration.  4. Small right pleural effusion with additional pleural thickening in  the right hemithorax, predominantly in the superior component.  5. Mild mediastinal lymphadenopathy, likely reactive.  6. Similar dilation of the main pulmonary artery, which can be seen  with pulmonary arterial hypertension.  7. Hyperattenuating free fluid in the left upper quadrant of uncertain  etiology. Consider CT of the abdomen and pelvis for further  assessment.\"    CT AP 6/27  \"IMPRESSION:   1.  No acute process in the abdomen or pelvis.  2.  Moderate to large amount of ascites.  3.  Trace left pleural effusion with adjacent atelectasis. Chronic small right pleural effusion with associated pleural thickening and adjacent rounded atelectasis.  4.  Atrophic kidneys with multiple bilateral nonobstructing intrarenal calculi. No hydronephrosis.  5.  Additional nonemergent findings as described in the report body.\"           Previous Endoscopy:   3/2024 colonoscopy for surveillance   \"- The examined portion of the ileum was normal.                             - Two 1 to 4 mm polyps in the cecum, removed with a                             cold snare. Resected and retrieved.                             - Rectal varices. \"         Medications:     Current Facility-Administered Medications   Medication Dose Route Frequency Provider Last Rate Last Admin    acetaminophen (TYLENOL) tablet 650 mg  650 mg Oral Q8H PRN Rashida Olivia MD        albumin human 25 % injection 25 g  25 g " Intravenous Once PRN Laine Rosas MD        aspirin EC tablet 81 mg  81 mg Oral Daily Rashida Olivia MD   81 mg at 06/27/25 0828    calcium carbonate (TUMS) chewable tablet 500 mg  500 mg Oral 4x Daily PRN Rashida Olivia MD        glucose gel 15-30 g  15-30 g Oral Q15 Min PRN Rashida Olivia MD        Or    dextrose 50 % injection 25-50 mL  25-50 mL Intravenous Q15 Min PRN Rashida Olivia MD        Or    glucagon injection 1 mg  1 mg Subcutaneous Q15 Min PRN Rashida Olivia MD        gabapentin (NEURONTIN) capsule 300 mg  300 mg Oral At Bedtime PRN Rashida Olivia MD        heparin 25,000 units in 0.45% NaCl 250 mL ANTICOAGULANT infusion  0-5,000 Units/hr Intravenous Continuous Katia Garza 17 mL/hr at 06/27/25 0828 1,700 Units/hr at 06/27/25 0828    lacosamide (VIMPAT) tablet 50 mg  50 mg Oral Daily Rashida Olivia MD   50 mg at 06/27/25 0828    midodrine (PROAMATINE) tablet 20 mg  20 mg Oral Q8H Katia Garza   20 mg at 06/27/25 0828    naloxone (NARCAN) injection 0.2 mg  0.2 mg Intravenous Q2 Min PRN Rainer Jones MD        Or    naloxone (NARCAN) injection 0.4 mg  0.4 mg Intravenous Q2 Min PRN Rainer Jones MD        Or    naloxone (NARCAN) injection 0.2 mg  0.2 mg Intramuscular Q2 Min PRN Rainer Jones MD        Or    naloxone (NARCAN) injection 0.4 mg  0.4 mg Intramuscular Q2 Min PRN Rainer Jones MD        norepinephrine (LEVOPHED) 4 mg in  mL PERIPHERAL infusion  0.01-0.2 mcg/kg/min Intravenous Continuous Rashida Olivia MD   Stopped at 06/27/25 0916    ondansetron (ZOFRAN ODT) ODT tab 4 mg  4 mg Oral Q6H PRN Rashida Olivia MD        Or    ondansetron (ZOFRAN) injection 4 mg  4 mg Intravenous Q6H PRN Rashida Olivia MD   4 mg at 06/27/25 0828    oxyCODONE (ROXICODONE) tablet 5 mg  5 mg Oral Q6H PRN Rashida Olivia MD        pantoprazole (PROTONIX) EC tablet 40 mg  40 mg Oral BID Rashida Olivia MD   40 mg at 06/27/25 0828     piperacillin-tazobactam (ZOSYN) 2.25 g vial to attach to  ml bag  2.25 g Intravenous Q6H Rainer Jones MD        polyethylene glycol (MIRALAX) Packet 17 g  17 g Oral Daily PRN Rashida Olivia MD        rOPINIRole (REQUIP) tablet 0.5 mg  0.5 mg Oral BID Rashida Olivia MD   0.5 mg at 06/27/25 0830    senna-docusate (SENOKOT-S/PERICOLACE) 8.6-50 MG per tablet 1 tablet  1 tablet Oral BID PRN Rashida Olivia MD        Or    senna-docusate (SENOKOT-S/PERICOLACE) 8.6-50 MG per tablet 2 tablet  2 tablet Oral BID PRN Rashida Olivia MD        tacrolimus (GENERIC EQUIVALENT) capsule 1 mg  1 mg Oral Q12H Rashida Olivia MD   1 mg at 06/27/25 0828    vancomycin (VANCOCIN) 2,250 mg in sodium chloride 0.9 % 500 mL intermittent infusion  2,250 mg Intravenous Once Rainer Jones MD   2,250 mg at 06/27/25 0832    vancomycin place richards - receiving intermittent dosing  1 each Does not apply See Admin Instructions Rainer Jones MD                 Physical Exam:   /63   Pulse 74   Temp 98  F (36.7  C) (Axillary)   Resp 15   Wt 93.1 kg (205 lb 4 oz)   SpO2 100%   BMI 27.84 kg/m    Wt:   Wt Readings from Last 2 Encounters:   06/27/25 93.1 kg (205 lb 4 oz)   06/24/25 93.5 kg (206 lb 3.2 oz)      Constitutional: sitting up in bed in NAD  Eyes: anicteric conjunctiva  Ears/nose/mouth/throat: moist mucous membranes  Respiratory: normal work of breathing on ambient air  Abd: soft, nontender, distended, no peritoneal signs  Skin: warm, perfused, no jaundice  Neuro: alert and oriented to person, place, time not situation   MSK: moving extremities spontaneously          Past Medical History:     Past Medical History:   Diagnosis Date    Acquired immunocompromised state 11/19/2022    Acute renal failure, unspecified acute renal failure type 11/12/2022    Antiplatelet or antithrombotic long-term use     Arrhythmia     PEA    Ascites     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    Cancer (H)  2023    Squamous Cell Cancer- Since resolved    Cirrhosis of liver with ascites (H) 02/11/2016    Coagulopathy     Critical illness myopathy     Dialysis patient     DIALYSIS 3X/ WEEK    Embolic stroke (H) 11/20/2022    Encephalopathy     ESRD (end stage renal disease) on dialysis (H)     Gastroesophageal reflux disease     H/O alcohol abuse     History of blood transfusion     Hyperammonemia     Hyperlipidemia     Hypertension     Patients states that HTN has been resolved    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    Insomnia     Lactic acidosis 11/12/2022    Liver replaced by transplant (H) 09/20/2016    NAFLD (nonalcoholic fatty liver disease) 02/11/2016    CHRISTIAN on CPAP     Parainfluenza type 1 infection 11/19/2022    Parapneumonic effusion     Pleural effusion     Pneumothorax on left 12/16/2022    Respiratory acidosis 11/12/2022    Respiratory arrest (H) 11/12/2022    Restless legs syndrome (RLS)     SBP (spontaneous bacterial peritonitis) (H)     MNGI    Seizures (H) 12/2022    Sepsis due to Streptococcus pneumoniae with acute hypoxic respiratory failure (H) 11/19/2022    Stroke, embolic (H) 11/20/2022    Sudden cardiac arrest (H) 12/29/2022    PEA- 2 min of CPR    Tubular adenoma 10/2019    Large cecal adenoma- due for surveillance colonoscopy in 3 years (10/2022)            Past Surgical History:     Past Surgical History:   Procedure Laterality Date    APPENDECTOMY      BENCH LIVER N/A 09/20/2016    Procedure: BENCH LIVER;  Surgeon: Enoc Crews MD;  Location: [facility] OR    BRONCHOSCOPY FLEXIBLE AND RIGID N/A 12/20/2022    Procedure: BRONCHOSCOPY;  Surgeon: Alena Valenzuela MD;  Location:  GI    COLONOSCOPY  08/06/2013    repeat in 2018    COLONOSCOPY N/A 10/04/2019    Procedure: COLONOSCOPY, WITH POLYPECTOMY AND BIOPSY;  Surgeon: Go Chong MD;  Location: [facility] OR    COLONOSCOPY N/A 3/26/2024    Procedure: COLONOSCOPY, FLEXIBLE, WITH LESION REMOVAL USING SNARE;  Surgeon:  Jie Douglas MD;  Location:  GI    CREATE FISTULA ARTERIOVENOUS UPPER EXTREMITY Left 03/21/2023    Procedure: LEFT UPPER EXTREMITY ARTERIOVENOUS FISTULA CREATION. LIGATION OF COMPETING VASCULAR BRANCHES- LEFT;  Surgeon: Deejay Mcneil MD;  Location:  OR    CV CORONARY ANGIOGRAM N/A 2/22/2024    Procedure: Coronary Angiogram;  Surgeon: Nima Dimas MD;  Location:  HEART CARDIAC CATH LAB    CV PERICARDIOCENTESIS N/A 9/29/2023    Procedure: Pericardiocentesis;  Surgeon: Barry Mckeon MD;  Location:  HEART CARDIAC CATH LAB    CV PERICARDIOCENTESIS N/A 10/11/2023    Procedure: Pericardiocentesis;  Surgeon: Ulises Bhakta MD;  Location:  HEART CARDIAC CATH LAB    CV RIGHT HEART CATH MEASUREMENTS RECORDED N/A 10/11/2023    Procedure: Right Heart Catheterization;  Surgeon: Ulises Bhakta MD;  Location:  HEART CARDIAC CATH LAB    HERNIA REPAIR      IR CHEST TUBE PLACEMENT NON-TUNNELED RIGHT  12/12/2022    IR CVC TUNNEL PLACEMENT > 5 YRS OF AGE  12/06/2022    IR CVC TUNNEL PLACEMENT > 5 YRS OF AGE  1/3/2024    IR CVC TUNNEL PLACEMENT > 5 YRS OF AGE  6/23/2025    IR DIALYSIS FISTULOGRAM LEFT  07/13/2023    IR DIALYSIS FISTULOGRAM LEFT  6/23/2025    IR GASTROSTOMY TUBE CHANGE  02/08/2023    IR GASTROSTOMY TUBE PERCUTANEOUS PLCMNT  11/29/2022    IR PARACENTESIS  11/29/2022    IR PARACENTESIS  12/01/2022    IR PARACENTESIS  2/10/2016    IR PARACENTESIS  2/28/2016    IR THORACENTESIS  12/06/2022    IR THORACENTESIS  09/01/2020    IR THORACENTESIS  08/10/2020    IR TRANSCATHETER BIOPSY  12/01/2022    IR TRANSCATHETER BIOPSY  4/9/2024    REVISION FISTULA ARTERIOVENOUS UPPER EXTREMITY Left 8/15/2023    Procedure: 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 2; OUTFLOW REVISION CEPHALIC TO BRACHIAL VEIN;  Surgeon: Deejay Mcneil MD;  Location:  OR    REVISION FISTULA ARTERIOVENOUS UPPER EXTREMITY Left 1/3/2024    Procedure: Excision and repair of LEFT  brachiocephalic arteriovenous fistula and vein patch angioplasty;  Surgeon: Deejay Mcneil MD;  Location: [facility] OR    THROMBECTOMY UPPER EXTREMITY Left 2025    Procedure: Thrombectomy upper extremity, repair of aneurysm, left upper arm;  Surgeon: Deejay Mcneil MD;  Location: [facility] OR    TRACHEOSTOMY N/A 2022    Procedure: TRACHEOSTOMY;  Surgeon: Nilesh Jackson MD;  Location:  OR    TRANSPLANT LIVER RECIPIENT  DONOR N/A 2016    Procedure: TRANSPLANT LIVER RECIPIENT  DONOR;  Surgeon: Enoc Crews MD;  Location: [facility] OR            Social History:   Patient lives in [place] with his spouse.    Alcohol use history:   - In  when he was evaluated by transplant social work he reported last alcohol use May 2015 with baseline consumption of 1-2 glassess of wine or beer per week without history of heavy alcohol use, treatment, or legal consequences.   - Ethyl glucuronide positive in , PETH negative 10/2023  - Notes alcohol use post-transplant          Family History:     Family History   Problem Relation Age of Onset    Coronary Artery Disease No family hx of     Cardiomyopathy No family hx of     Anesthesia Reaction No family hx of     Deep Vein Thrombosis (DVT) No family hx of           Allergies:      No Known Allergies         Review of Systems:     A complete 10 point review of systems was performed and is negative except as noted in the HPI

## 2025-06-27 NOTE — PROGRESS NOTES
06/27/25 1121   Appointment Info   Signing Clinician's Name / Credentials (OT) DEMETRIUS Milian   Rehab Comments (OT) OT only   Living Environment   People in Home spouse   Current Living Arrangements condominium   Home Accessibility stairs to enter home   Number of Stairs, Main Entrance greater than 10 stairs   Stair Railings, Main Entrance railings safe and in good condition   Transportation Anticipated family or friend will provide   Living Environment Comments pt and spouse report living in a condo, flight of stairs to access. pt spouse can A as needed at d/c. Pt reports access to FWW, has shower chair, and raised toilet seat   Self-Care   Usual Activity Tolerance moderate   Current Activity Tolerance moderate   Regular Exercise Yes   Activity/Exercise Type other (see comments)  (walking stairs)   Equipment Currently Used at Home raised toilet seat   Fall history within last six months no   Activity/Exercise/Self-Care Comment pt reports IND at baseline with ADLs and mobility. Likes to walk up stairs for exercise.   General Information   Onset of Illness/Injury or Date of Surgery 06/27/25   Referring Physician Rashida Olivia MD   Patient/Family Therapy Goal Statement (OT) to go home, increase IND   Additional Occupational Profile Info/Pertinent History of Current Problem per chart Blanco Osborne is a 59 yo male with Pmhx ESRD (on HD MWF since 1/23), NAFLD s/p liver transplant (9/2016), PEA arrest 2/2 hypoxic respiratory failure (2022), pAF, CVA, HLD, GERD, CHRISTIAN on CPAP, RLS, and hx seizures. Presented to [facility] ED for hypoxia and AMS. Appeared to be volume overloaded on ED exam, subsequently admitted to [facility] 6/26 for HD. However after HD pt was persistently hypotensive despite midodrine, therefore started on pressors and transferred to [facility] given bed availability. Treating for shock 2/2 presumed infection, also obtaining CT PE.   Existing Precautions/Restrictions fall;oxygen therapy  device and L/min   Cognitive Status Examination   Orientation Status orientation to person, place and time   Cognitive Status Comments Slow to respond at times, follows commands appropriately, good/fair safety awareness   Sensory   Sensory Quick Adds sensation intact   Posture   Posture not impaired   Range of Motion Comprehensive   General Range of Motion no range of motion deficits identified   Strength Comprehensive (MMT)   General Manual Muscle Testing (MMT) Assessment no strength deficits identified   Coordination   Upper Extremity Coordination No deficits were identified   Bed Mobility   Bed Mobility supine-sit   Supine-Sit [place] (Bed Mobility) supervision   Transfers   Transfers sit-stand transfer;toilet transfer;shower transfer   Sit-Stand Transfer   Sit-Stand [place] (Transfers) supervision   Shower Transfer   Type (Shower Transfer) lateral   [place] Level (Shower Transfer) set up;contact guard   Shower Transfer Comments per clinical judgement   Toilet Transfer   Type (Toilet Transfer) sit-stand   [place] Level (Toilet Transfer) supervision   Balance   Balance Comments good seated balance, no LOB with hallway ambulation   Activities of Daily Living   BADL Assessment/Intervention bathing;lower body dressing;toileting   Bathing Assessment/Intervention   [place] Level (Bathing) set up;verbal cues   Comment, (Bathing) per clinical judgement   Lower Body Dressing Assessment/Training   [place] Level (Lower Body Dressing) supervision   Comment, (Lower Body Dressing) per clinical judgement   Toileting   [place] Level (Toileting) supervision   Clinical Impression   Criteria for Skilled Therapeutic Interventions Met (OT) Yes, treatment indicated   OT Diagnosis pt is below baseline for ADLs   OT Problem List-Impairments impacting ADL problems related to;activity tolerance impaired   Assessment of Occupational Performance 1-3 Performance Deficits   Identified Performance Deficits  functional endurance, home management, showering   Planned Therapy Interventions (OT) ADL retraining;IADL retraining;home program guidelines;progressive activity/exercise;cognition   Clinical Decision Making Complexity (OT) problem focused assessment/low complexity   Risk & Benefits of therapy have been explained evaluation/treatment results reviewed;care plan/treatment goals reviewed;risks/benefits reviewed;current/potential barriers reviewed;participants voiced agreement with care plan;participants included;patient;spouse/significant other   Clinical Impression Comments pt presents below baseline, limited primarily by activity tolerance. pt will benefit from skilled OT to progress toward PLOF   OT Total Evaluation Time   OT Eval, Low Complexity Minutes (63651) 5   OT Goals   Therapy Frequency (OT) 4 times/week   OT Predicted Duration/Target Date for Goal Attainment 07/11/25   OT Goals Lower Body Bathing;OT Goal 1   OT: Lower Body Dressing Independent   OT: Lower Body Bathing Modified independent   OT: Toilet Transfer/Toileting Independent;Goal Met   OT: Goal 1   (pt will ascend x8 stairs at discharge with SBA and use of railing)   Interventions   Interventions Quick Adds Therapeutic Activity   Therapeutic Activities   Therapeutic Activity Minutes (13422) 24   Symptoms noted during/after treatment fatigue   Treatment Detail/Skilled Intervention Facilitated functional transfers and hallway ambulation to progress pt activity tolerance for ADLs. pt very motivated for OOB activity, requests to sit on commode prior to OOR. Pt STS and pivots to commode with SBA. pt IND with toilet transfer and managing gown. pt STS and pivots to recliner with SBA. Th sets up IV pole and gathers O2 tank for OOR activity. pt STS with SBA, ambulates 70ft x 2 with SBA and no AD. Pt without LOB, though moves at slow, steady pace. Pt desats briefly into mid 80s on 4L, though difficult to discern accuracy of reading, pt denies symptoms. pt  returns to recliner at end of session, all needs in reach.   OT Discharge Planning   OT Plan monitor cog, shower, stairs when able   OT Discharge Recommendation (DC Rec) home with assist   OT Rationale for DC Rec Anticipate pt will progress to be safe to d/c home with family A for heavy tasks. pt may benefit from OP PT follow up for higher level strength and balance.   OT Brief overview of current status SBA   OT Total Distance Amb During Session (feet) 120   Total Session Time   Timed Code Treatment Minutes 24   Total Session Time (sum of timed and untimed services) 29

## 2025-06-27 NOTE — PROGRESS NOTES
Date: 6/27/2025  Time: 2045     Data:  Pre Wt:   93.1 kg (bed scale)  Desired Wt:  - 0 kg   Post Wt:  93.1 kg (bed scale)  Weight change: - 0 kg  Ultrafiltration - Post Run Net Total Removed (mL):  0 ml  Vascular Access Status: CVC patent  Dialyzer Rinse:  Light  Total Blood Volume Processed: 72.6 L   Total Dialysis (Treatment) Time:   3.5 Hrs  Dialysate Bath: K 4, Ca 3  ,   Heparin: heparin lock- blue lumen-1.9 ml                                      Red lumen-1.9 ml    Lab:   No    Interventions:  Dialysis done through right tunneled CVC  Albumin prime done per MAR.   BP was soft throughout run, PCN administer midodrine and albumin for pressure support  Treatment has ended safely and  blood is rinsed back completely.  Catheter lumens flushed with saline and locked with heparin, catheter caps (ClearGuard ) changed post HD.  Report given to PETER Vernon and Pt remained to room with stable condition     Assessment:  A & O x 1, disoriented to time,place and situation.confused, denies pain  CVC dressing due on 6/30/2025. CVC intact, clean and dry.                 Plan:    Per Renal team        RICKI VIVAS, RN  Acute Dialysis RN  [facility]

## 2025-06-27 NOTE — PLAN OF CARE
Goal Outcome Evaluation:    ICU End of Shift Summary. See flowsheets for vital signs and detailed assessment.    Changes this shift: A&O x 3 - Disoriented to Situation, Lethargic, Left arm pain from thrombectomy. Afebrile, SR 70-80, L Arm Swollen, Peripheral Levo @ 0.03. NC 4 LPM. Renal Diet, Nausea Intermittent, Firm Abdomen, Unknown LBM. Oliguric. 3 PIV's in R Arm. Albumin 25g given. Heparin gtt @ 1700. Chest CT complete.     Plan: MAP>65. Possible Paracentesis.      Plan of Care Reviewed With: Patient    Overall Patient Progress: No change    Outcome Evaluation: See note

## 2025-06-27 NOTE — PROGRESS NOTES
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 Persistently hypotensive with HD at [facility] and was subsequently transferred to [facility] MICU. Found to have PE without heart strain.    CHANGES and MAJOR THINGS TODAY:   - Heparin for PE  - HD for clearance today  - Paracentesis      PLAN:    Neuro:  #AMS  #Lethargy   Patient A&Ox3 but tired. Suspect AMS/lethargy in setting of toxic metabolic encephalopathy 2/2 metabolic derangements associated with ESRD. Consider infection in setting of new abdominal pain and hypotension in patient with ascites and history of SBP. Less likely intracranial pathology given negative CT head + neuro exam nonfocal. Does have hx of seizure, but no reported events per chart (not able to reach family to ask outright), does not appear postictal, WBC and lactate wnl (1.4).   - TSH    - lacosamide level   - ethyl alcohol level (while no history of active use, some notes point out alcohol use post transplant. AST 3x the level of ALT as well, plus evidence of fibrosis and hepatitis on liver biopsy)   - infection txt as below      #Hx seizures  Follows with neurology outpatient: last appt was 10/24. He had a seizure episode in 2022 in setting of admission for septic shock; prolonged video EEG was performed 12/20-23/2022 at [facility]. The patient had 2 seizures during this monitoring. Reportedly sx were gaze deviation, facial twitching and grimacing. He was started on lacosamide and levetiracetam- levetiracetam has since been tapered off.   - Continue lacosamide 50mg daily (of note, [facility] note states that pt takes every other day as he is being \"weaned off\" by " "his neurologist\". Per 10/24 note review, neurology advised pt to stay on this medication).   - Lacosamide level      #Hx CVA  His MRI 12/16/2022 showed chronic right parietal encephalomalacia and multiple lacunar strokes in the left frontal, left temporal and posterior right frontal lobe, consistent with embolic strokes. No gross deficits on exam.   - PTA ASA 81mg       #RLS  - PTA ropinirole 0.5mg BID     Pulmonary:  # AHRF  # Pulmonary Embolism  CT PE w/contrast positive for acute PE involving left upper lobe. No evidence of heart strain on CT.  -Started on high-intensity heparin gtt  #Bilateral pleural effusions (Trace L, chronic small R)   Presented to [facility] with hypoxia, new O2 requirement. BNP elevated compared to prior (18.3k compared to 4.3k 4 days prior). CXR with pulmonary vascular congestion. CT AP showed trace left pleural effusion with adjacent atelectasis, chronic small right pleural effusion with associated pleural thickening and adjacent rounded atelectasis. Underwent UF with 1.5L off with subsequent improvement in O2 needs (4L --> 2L).   No fever, elevated WBC or left shift (though notably immunosuppressed in setting of transplant). Lactic acid wnl (1.4). Most likely secondary to PE. However, given report of hypoxia and subsequent hypotension with HD, concern for respiratory infection- HAP given history of hospitalizations.  Plan:  - HAP txt: zosyn, vanc  - Sputum cx  - NC for O2 support, keep sat > 92%     #CHRISTIAN  Unclear if uses CPAP at home.      Cardiovascular:  #Hypotension   #Hx chronic hypotension  Pt has ongoing concerns with chronic hypotension: typically takes midodrine on dialysis days and sometime he takes extra doses as needed for symptoms. His average systolic BP is 80s-90s and he can feel asymptomatic with BP in the 80s. Notably after UF 6/26 with persistently low BPs despite 50mg midodrine total- required norepi initiation. No fever, elevated WBC or left shift (though notably " immunosuppressed in setting of transplant). Lactic acid wnl.   DDx remains broad: Most likely due to PE, identified on CT 6/27. Also consider thought is SBP given report of abdominal pain, presence of ascites on imaging, history of SBP. Additionally considering HAP given hypoxia. Less likely bleeding though this is possibility given Hgb 9 --> 7- no obvious bleeding noted on exam. He recently had a stress test that had good LVEF so less likely cardiogenic (also warm and well-perfused on exam).   Plan:  - MAP goal >65               - norepi gtt  - Midodrine               - Changed to ICU dosing 20mg Q 8 hours  - Abx:               - Ceftriaxone (6/26)                - Zosyn 6/27- present               - Vancomycin 6/27- present  - Cultures:               - Blood cultures from [facility] in process  - Plan for paracentesis- CAP team consulted     # Tricuspid Insufficiency  New on echocardiogram 6/27/25, as compared to previous study 1/11/24. Appears functional, RV with normal size and normal function (borderline reduced).  - Right ventricular pressure elevated on echo. Left-sided cardiac function within normal limits.  - Consider new portopulmonary hypertension vs other causes of pulmonary hypertension. Less likely primary right heart failure with tricuspid regurgitation out of proportion with reduction in RV function on echo.  - Likely noncontributory to acute presentation.     # Hx of pericardial effusion 2023, s/p pericardiocentesis x 2 in 9/23 + 10/23   Echocardiogram in March 2023 showed normal LVEF, no significant valve disease, no obvious vegetations, but with note of a small posterior pericardial effusion without tamponade. Pt was followed with many serial echocardiograms since the initial effusion was discovered. With this, the effusion continued to develop, and eventually he returned to [facility] for pericardiocentesis on 9/29/23 & another one in Oct 2023. Felt to be due to volume overload due to renal failure   -  ECHO showing EF 60-65%, tricuspid insufficiency     # CAD  Of note, underwent dobutamine stress test 4/25 which was negative for myocardial ischemia. The left ventricular ejection fraction increased from 65% at rest to 75% at peak stress. His RV was mildly dysfunctional with the RVSP was just 26 mmHg and CVP was 5. There was no significant valvulopathy.   - PTA ASA 81      # Hx paroxysmal Afib  Currently in the work up for a watchman. 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 He has had ascites now several years requiring intermittent paracenteses.   Per report, pt has a number of features consistent with clinically significant portal hypertension. Repeat pressure measurements showed HVPG of 12-13 mmHg. Review of imaging shows evidence of abdominal varices, splenomegaly, and a nodular-appearing liver with ascites. Total protein in ascitic fluid has varied to greater than or less than 2.5 with SAAG >1.1. Prior colonoscopy did show rectal varices, per report. He has not had a recent upper endoscopy. Portal and hepatic veins are patent. Notably, he had a liver biopsy in our system 4/09/2025 showed severe hepatitis with many plasma cells, activity grade 3-4 /4; likely mild fibrosis. Hepatology suspects that NRH is contributing factor to his underlying portal hypertension- this along with his ESRD and difficulty pulling fluid during dialysis all contributing to his  ascites.  - Hepatology consulted, appreciate recs  - Continue PTA tacrolimus: 1mg BID               - tacro level in AM  - SBP management: s/p 1 dose ceftriaxone, continuing with zosyn given c/f HAP as well                - Has hx SBP: last culture data visible is in 2022, lactobacillus, notably not on prophylactic cipro   - CAPS consult for paracentesis   - ethyl alcohol level (while no history of active use, some notes point out alcohol use post transplant. AST 3x the level of ALT as well, plus evidence of fibrosis and hepatitis on liver biopsy)      # GERD  - PTA PPI     # Hernias  He has a left large inguinal hernia, a right small inguinal hernia, an umbilical hernia, and a superior ventral hernia. No surgical interventions planned given high risk of recurrence and post-op complications if attempted to repair.      Renal/Fluids/Electrolytes:  # ESRD on HD  # Hypotension with HD  Pt has required hemodialysis since 11/2022 due to an acute kidney injury during a hospitalization for respiratory failure and sepsis. Per review, HD is difficult for him to handle given hypotension- requires midodrine with HD.   Currently being evaluated at [place] for kidney transplant (though key question, given poor hepatic function, is whether he should proceed with kidney transplant alone).   Dry weight list as 89.5kg- current weight recorded is 93.1kg.   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 He then underwent fistulogram with IR 6/23 with large thrombus noted and ultimately inability to keep fistula open. Subsequently tunnel cath placed via IR 6/23 & he underwent thrombectomy with repair of fistula aneurysms with Dr Mcneil 6/24.  Discharged with plan for scheduled HD on 6/27.   - Tunneled HD line in place     Endocrine:  No acute concerns.      ID:  # C/f infection: HAP, SBP  Presented with hypoxia and subsequent hypotension after HD. Abdomen tender, along with history of SBP concerning for new peritoneal infection. Given history of hypoxia and multiple hospitalizations preceding this one, concern for HAP as well.  - s/p ceftriaxone dose in [facility]  - Abx:               - zosyn 6/27- present               - vancomycin 6/27- present  - Cultures               - Bcx 6/26 ([facility]): in process               - sputum culture  - Anuric, so holding off Legionella/strep urine testing      Hematology:    # Chronic macrocytic anemia   Per review, baseline Hgb around 8-9- likely in setting of anemia of renal disease. On admission, Hgb lower at 7.6. No s/s bleeding.   - Daily Hgb checks  - Transfuse for Hgb <7  - EPO per nephrology  - Checking B12, folate given elevated MCV      # Chronic thrombocytopenia   Likely in setting of liver dysfunction.   - CTM      Musculoskeletal:  # Weakness/deconditioning   - PT/OT     Skin:  No acute concerns      General Cares/Prophylaxis:    DVT Prophylaxis: heparin gtt   GI Prophylaxis: PPI  Restraints: None  Family Communication: Wife Ofe; called, no answer, will try again in AM  Code Status: FULL     Lines/tubes/drains:  - HD tunneled line places 6/23  - PIV x2 RUE 6/26     Disposition:  - Medical ICU     Patient seen and findings/plan discussed with medical ICU staff, Dr. Smith.    Katia Garza    Clinically Significant Risk Factors Present on Admission         # Hyponatremia: Lowest Na = 130 mmol/L in last 2 days, will monitor as appropriate  # Hypochloremia: Lowest Cl = 91 mmol/L in last 2 days, will monitor as appropriate       # Drug Induced Coagulation Defect: home medication list includes an anticoagulant medication  # Drug Induced Platelet Defect: home medication list includes an antiplatelet medication    # Hypertension: Noted on problem list   # Circulatory Shock: required vasopressors within past 24 hours       # Anemia: based on hgb <11       # Overweight: Estimated body mass index is 27.84 kg/m  as calculated from the following:    Height as of 6/24/25: 1.829 m (6').    Weight as of this encounter: 93.1 kg (205 lb 4 oz).       # Financial/Environmental Concerns:        # Anemia: based on hgb <11             ====================================  INTERVAL HISTORY:   Patient sitting up in bed in no acute distress, speaking with family at bedside.    OBJECTIVE:   1. VITAL SIGNS:   Temp:  [97.9  F (36.6  C)-98.2  F (36.8  C)] 98.1  F (36.7  C)  Pulse:  [] 74  Resp:  [11-34] 15  BP: ()/(41-85) 104/63  SpO2:  [79 %-100 %] 100 %  Resp: 15  2. INTAKE/ OUTPUT:   I/O last 3 completed shifts:  In: 320.62 [I.V.:320.62]  Out: -     3. PHYSICAL EXAMINATION:  General: Patient is chronically ill-appearing in no acute distress.  HEENT: Normocephalic, atraumatic  Neuro: Awake, A&Ox3, mildly somnolent, asterixis present, 5/5 strength in BLE and BUE  Pulm/Resp: On 2L NC, sitting up in bed. Crackles appreciated in the bilateral lower lung fields without wheezing or rhonchi. No accessory muscle usage. Patient frequently coughing with exam.   CV: RRR, capillary refill <2 seconds  Abdomen: Mild non-focal tenderness, positive fluid wave, tympanitic abdomen  : Patient anuric  Incisions/Skin: Bruising to the LUE (site of fistula recannulation), skin otherwise dry    4. LABS:   Arterial Blood Gases   No lab results found in last 7 days.  Complete Blood Count   Recent Labs   Lab 06/27/25  0344 06/27/25  0248 06/26/25  1302 06/25/25  0713 06/24/25  0650   WBC 9.2 7.5 9.2  --  9.2   HGB 7.4* 6.9* 7.6* 7.3* 8.8*   * 97* 105*  --  86*     Basic Metabolic Panel  Recent Labs   Lab 06/27/25  0248 06/27/25  0140 06/26/25  1302 06/25/25  0713 06/24/25  0650   *  --  132* 131* 135   POTASSIUM 4.0  --  4.2 4.3 4.9   CHLORIDE  92*  --  91* 91* 94*   CO2 20*  --  26 24 23   BUN 29.9*  --  25.4* 46.4* 36.0*   CR 6.15*  --  5.64* 7.96* 6.73*   * 120* 192* 130* 103*     Liver Function Tests  Recent Labs   Lab 06/27/25  0248 06/26/25  1302 06/25/25  0713 06/24/25  0650   * 147*  --   --    ALT 37 54  --   --    ALKPHOS 179* 208*  --   --    BILITOTAL 0.8 1.0  --   --    ALBUMIN 4.0 4.0  --   --    INR 1.47* 1.47* 1.27* 1.49*     Coagulation Profile  Recent Labs   Lab 06/27/25 0248 06/26/25  1302 06/25/25  0713 06/24/25  0650   INR 1.47* 1.47* 1.27* 1.49*       5. RADIOLOGY:   Recent Results (from the past 24 hours)   XR Chest 2 Views    Narrative    EXAM: XR CHEST 2 VIEWS  LOCATION: [facility]  DATE: 6/26/2025    INDICATION: Shortness of breath  COMPARISON: Chest radiograph 6/22/2025      Impression    IMPRESSION: Cardiac silhouette is enlarged, not significantly changed from prior exam. Tunneled dialysis catheter with tip overlying the right atrium. There is pulmonary vascular congestion with small bilateral pleural effusions, right greater than left.   No lobar airspace consolidation or pneumothorax. No acute bony abnormality.   CT Head w/o Contrast    Narrative    EXAM: CT HEAD W/O CONTRAST  LOCATION: [facility]  DATE: 6/26/2025    INDICATION: ams  COMPARISON: 11/3/2024 head CT.  TECHNIQUE: Routine CT Head without IV contrast. Multiplanar reformats. Dose reduction techniques were used.    FINDINGS:  INTRACRANIAL CONTENTS: No intracranial hemorrhage, extraaxial collection, or mass effect.  No CT evidence of acute infarct. Mild presumed chronic small vessel ischemic changes. Mild generalized volume loss. No hydrocephalus.     VISUALIZED ORBITS/SINUSES/MASTOIDS: No intraorbital abnormality. No paranasal sinus mucosal disease. No middle ear or mastoid effusion.    BONES/SOFT TISSUES: No acute abnormality.      Impression    IMPRESSION:  1.  Age-related changes as above with no  acute intracranial abnormality.     CT Abdomen Pelvis w Contrast    Narrative    EXAM: CT ABDOMEN PELVIS W CONTRAST  LOCATION: [facility]  DATE: 6/26/2025    INDICATION: New abdominal pain and hypotension.  COMPARISON: CT chest abdomen and pelvis 11/3/2024.  TECHNIQUE: CT scan of the abdomen and pelvis was performed following injection of IV contrast. Multiplanar reformats were obtained. Dose reduction techniques were used.  CONTRAST: 103 mL Isovue 370    FINDINGS:     LOWER CHEST: Bilateral gynecomastia. Chronic small right pleural effusion with associated pleural thickening and calcifications. Right lower lobe calcified granuloma and chronic right lower lobe rounded atelectasis appear similar. Trace left pleural   effusion with adjacent atelectasis. Cardiomegaly.    HEPATOBILIARY: Status post liver transplant. Lobulated contours of the transplanted liver appear similar. A tiny left hepatic cyst is unchanged. No new liver lesions. No biliary ductal dilation.    PANCREAS: A 0.8 cm cystic lesion within the pancreatic body is unchanged (3/#78). No dilation of the main pancreatic duct.    SPLEEN: Splenomegaly measuring 18.3 cm.    ADRENAL GLANDS: Normal.    KIDNEYS/BLADDER: Atrophic kidneys. Nonobstructing right upper pole renal calculus measuring 0.5 cm. Multiple nonobstructing left renal calculi measuring up to 0.3 cm. Multiple subcentimeter hypodense renal lesions which are too small to characterize and   do not require specific follow-up. No hydronephrosis. Bladder is decompressed.    BOWEL: No evidence of bowel obstruction or inflammation. Appendix is absent.    PERITONEUM/RETROPERITONEUM: Moderate to large amount of ascites.     LYMPH NODES: No enlarged lymph node. Scattered prominent lymph nodes are present within the central mesentery are likely reactive in nature.    VASCULATURE: Patent portal, splenic, and superior mesenteric veins. Mild aortobiiliac atherosclerosis. No abdominal  aortic aneurysm.    PELVIC ORGANS: Normal.    MUSCULOSKELETAL: Large left inguinal hernia containing ascites. Shallow lower ventral abdominal wall hernias containing ascites. Moderate midline upper abdominal wall hernia containing ascites and a small amount of fat. Multiple old healed rib fractures.   Multilevel degenerative change of the spine. Unchanged compression deformities of the L1 and T10 vertebral bodies. No acute bony abnormality.      Impression    IMPRESSION:     1.  No acute process in the abdomen or pelvis.    2.  Moderate to large amount of ascites.    3.  Trace left pleural effusion with adjacent atelectasis. Chronic small right pleural effusion with associated pleural thickening and adjacent rounded atelectasis.    4.  Atrophic kidneys with multiple bilateral nonobstructing intrarenal calculi. No hydronephrosis.    5.  Additional nonemergent findings as described in the report body.   CT Chest Pulmonary Embolism w Contrast   Result Value    Radiologist flags Pulmonary emboli in posterior segmental and (Urgent)    Narrative    EXAM: CTA pulmonary angiogram, 6/27/2025 6:31 AM    HISTORY: Concern for PE- hypoxia, BNP elevation, hx PE not taking AC  reliably    COMPARISON: CT CAP 11/3/2024.    TECHNIQUE: Volumetric CT images obtained through the chest with  contrast. Coronal and axial MIP reformatted images obtained.  Three-dimensional (3D) post-processed angiographic images were  reconstructed, archived to PACS and used in interpretation of this  study.     CONTRAST: 68 ml isovue 370 IV.    FINDINGS:   Vascular: There is good contrast opacification of the pulmonary  arterial vasculature. Filling defects in posterior segmental and  subsegmental arterial branches in the left upper lobe. Heart is normal  size without pericardial effusion. No evidence of right heart strain.  Similar dilation of the main pulmonary artery up to 4.2 cm. No  thoracic aortic aneurysm. Bovine configuration of the great  vessels.  Mild calcified plaque in the aortic arch.    Remaining Chest: Central tracheobronchial tree is patent. Dense  consolidation of the majority of the right lower lobe. Multifocal  subsegmental atelectasis and/or scarring in the right lung. Scattered  patchy consolidative opacities throughout the mid and lower right  lung. Left basilar atelectasis. No pneumothorax. Small right pleural  effusion. Pleural thickening predominantly in the superior right  hemithorax. Prominent and mildly enlarged paratracheal lymph nodes,  for example a right lower paratracheal lymph node measuring up to 1.2  cm in short axis (4/134). Stable pleural calcifications in the right  base.    Upper Abdomen: Partially visualized hyperattenuating fluid in the left  upper quadrant.    Bones/Soft Tissues: No acute abnormalities or suspicious lesions.  Chronic fracture deformities of bilateral anterior ribs and mid  sternal body. Multilevel degenerative changes of the visualized spine.  Bilateral gynecomastia.      Impression    IMPRESSION:   1. Exam is positive for acute pulmonary embolism. Pulmonary emboli in  posterior segmental and subsegmental arterial branches in the left  upper lobe. No evidence of right heart strain.   2. Dense consolidation of the right lower lobe, likely atelectasis but  infection cannot be entirely excluded.  3. Scattered patchy consolidative opacities in the mid to lower right  lung, suggestive of an infectious and/or inflammatory process versus  aspiration.  4. Small right pleural effusion with additional pleural thickening in  the right hemithorax, predominantly in the superior component.  5. Right upper quadrant ascites.    [Urgent Result: Pulmonary emboli in posterior segmental and  subsegmental branches in the left upper lobe]    Finding was identified on 6/27/2025 at 6:34 AM.     Dr. Olivia was contacted by Dr. Armijo on 6/27/2025 at 6:35 AM and  verbalized understanding of the urgent finding.     In the event  of a positive result for acute pulmonary embolism  resulting in right heart strain, consider calling the   [facility] hospital  for PERT (Pulmonary Embolism Response Team)  Activation?    PERT -- Pulmonary Embolism Response Team (Multidisciplinary team  including cardiology, interventional radiology, critical care,  hematology)    I have personally reviewed the examination and initial interpretation  and I agree with the findings.    LUIS MIGULE ADAMS DO         SYSTEM ID:  [id number]   Echocardiogram Complete   Result Value    LVEF  60-65%    Narrative    [id number]  [id number]  [id number]  [id number]^ENLI^RALPH^YOLA     [facility],[place]  Echocardiography Laboratory  [address] [place] [address]     Name: MARY JO CRAIN  MRN: [id number]  : 1965  Study Date: 2025 07:21 AM  Age: 60 yrs  Gender: Male  Patient [place] [facility] [place]  Reason For Study: SOB, Hypotension  Ordering Physician: RALPH QUINTEROS  Referring Physician: LELA SHEPARD  Performed By: Ruddy Da Silva RDCS     BSA: 2.2 m2  Height: 72 in  Weight: 205 lb  HR: 70  BP: 97/63 mmHg  ______________________________________________________________________________  Procedure  Echocardiogram with two-dimensional, color and spectral Doppler.  ______________________________________________________________________________  Interpretation Summary  Global and regional left ventricular function is normal with an EF of 60-65%.  Left ventricular diastolic function is normal.  Flattened septum is consistent with right ventricular pressure and volume  overload.  The right ventricle is normal size.  Global right ventricular function is borderline reduced.  Moderate to severe tricuspid insufficiency is present. TR is central and  appear functional, pulm HTN?  The right ventricular systolic pressure is 28mmHg above the right atrial  pressure.  IVC diameter and respiratory changes fall into an intermediate range  suggesting an RA  pressure of 8 mmHg.  No pericardial effusion is present.     This study was compared with the study from 1/11/2024 .  TR is new, RA pressure increased.     ______________________________________________________________________________  Left Ventricle  Left ventricular size is normal. Left ventricular wall thickness is normal.  Global and regional left ventricular function is normal with an EF of 60-65%.  Left ventricular diastolic function is normal. Flattened septum is consistent  with right ventricular pressure and volume overload.     Right Ventricle  The right ventricle is normal size. Global right ventricular function is  borderline reduced.     Atria  The right atria appears normal. Moderate left atrial enlargement is present.     Mitral Valve  Mild mitral annular calcification is present.     Aortic Valve  Trileaflet aortic sclerosis without stenosis. Trace aortic insufficiency is  present.     Tricuspid Valve  Moderate to severe tricuspid insufficiency is present. The right ventricular  systolic pressure is approximated at 28.7 mmHg plus the right atrial pressure.  The right ventricular systolic pressure is 28mmHg above the right atrial  pressure.     Pulmonic Valve  On Doppler interrogation, there is no significant stenosis or regurgitation.     Vessels  The aorta root is normal. The thoracic aorta is normal. IVC diameter and  respiratory changes fall into an intermediate range suggesting an RA pressure  of 8 mmHg.     Pericardium  No pericardial effusion is present.     Compared to Previous Study  This study was compared with the study from 1/11/2024 . TR is new, RA pressure  increased.  ______________________________________________________________________________  MMode/2D Measurements & Calculations  IVSd: 1.1 cm     LVIDd: 4.3 cm  LVIDs: 2.8 cm  LVPWd: 0.94 cm  FS: 35.1 %  LV mass(C)d: 141.5 grams  LV mass(C)dI: 65.7 grams/m2  asc Aorta Diam: 3.3 cm  LVOT diam: 2.3 cm  LVOT area: 4.3 cm2  Asc Ao diam  index BSA (cm/m2): 1.5  Asc Ao diam index Ht(cm/m): 1.8  LA Volume Index (BP): 42.3 ml/m2  RWT: 0.44  TAPSE: 1.5 cm     Doppler Measurements & Calculations  MV E max baljeet: 97.2 cm/sec  MV A max baljeet: 106.0 cm/sec  MV E/A: 0.92  MV dec slope: 535.1 cm/sec2  MV dec time: 0.18 sec  Ao V2 max: 224.6 cm/sec  Ao max P.2 mmHg  Ao V2 mean: 159.5 cm/sec  Ao mean P.3 mmHg  Ao V2 VTI: 51.5 cm  WOLFGANG(I,D): 2.7 cm2  WOLFGANG(V,D): 2.7 cm2  LV V1 max P.9 mmHg  LV V1 max: 140.4 cm/sec  LV V1 VTI: 32.3 cm  SV(LVOT): 138.6 ml  SI(LVOT): 64.4 ml/m2  PA acc time: 0.06 sec  TR max baljeet: 268.1 cm/sec  TR max P.7 mmHg  AV Baljeet Ratio (DI): 0.63  WOLFGANG Index (cm2/m2): 1.3  E/E' av.6  Lateral E/e': 7.8  Medial E/e': 9.4     ______________________________________________________________________________  Report approved by: LYDIA NUNEZ MD on 2025 08:38 AM

## 2025-06-27 NOTE — PLAN OF CARE
ICU End of Shift Summary. See flowsheets for vital signs and detailed assessment.    Changes this shift: No acute changes.  Pt forgetful but oriented.  Tolerating HD run.  Paracentesis done.      Goal Outcome Evaluation:      Plan of Care Reviewed With: patient, spouse, sibling    Overall Patient Progress: no changeOverall Patient Progress: no change    Outcome Evaluation: VSS

## 2025-06-27 NOTE — CONSULTS
Nephrology Initial Consult  June 27, 2025      Blanco Osborne MRN:[id number] YOB: 1965  Date of Admission:6/27/2025  Primary care provider: Ki Carter  Requesting physician: Rainer Jones*    ASSESSMENT AND RECOMMENDATIONS:   Blanco Osborne is a 61 yo male with Pmhx ESRD (on HD MWF since 1/23), NAFLD s/p liver transplant (9/2016), PEA arrest 2/2 hypoxic respiratory failure (2022), pAF, CVA, HLD, GERD, CHRISTIAN on CPAP, RLS, and hx seizures. Presented to [facility] ED for hypoxia and AMS. Appeared to be volume overloaded on ED exam, subsequently admitted to [facility] 6/26 for HD. However after HD pt was persistently hypotensive despite midodrine, therefore started on pressors and transferred to [facility] given bed availability. Treating for shock 2/2 presumed infection, also found to have PE.    #ESKD: dialyzes MWF at [facility] with Dr. Bradshaw. Access: tunneled RIJ (clotted L AVF s/p revision with Dr Mcneil 6/24/25, not to be used for 1 month). Run time: 3.5 hrs. TW 89.5 kg. Active on tx list here and is also being evaluated at [place]  - Pt had HD 6/25 and UF only run yesterday 6/26.  - plan dialysis today 6/27 for clearance only; will consider another run over the weekend for volume off if needed/ hemodynamics allow.     #BP/Volume: anuric, TW 89.5 kg but has been coming off HD at 90.5 kg since 6/13  - hx of intradialytic hypotension   - per [facility], BP's remain in 100's generally throughout HD  - UF 2L on 6/25, 1.5L 6/26, limited by hypotension  - initially on NE, now weaned off  - evidence of volume overload on CXR and echo  - O2 100% on 4L  - bed weight 93.1 kg  - echo 6/27 with EF 60-65%, flattened septum c/w R ventricular pressure and volume overload (also in setting of new PE), IVC diameter and resp changes in intermediate range; no pericardial effusion    #Anemia of CKD: on mircera 30 mcg [id number]  - hgb 7's  - ordered epogen 4000 units per HD    #Acid/base: HCO3 20,  "last VBG 6/26: 7.30/59/61/29, LA 1.4 on 6/26    #Electrolytes: K 4.0, Na 130    #BMD: Ca 8's, alb 4.0, phos 4.8    #ID: on vanc/zosyn for possible PNA or SBP (para pending); no leukocytosis (on immunosuppression)    #New PE: on heparin  - CT with \"PE in posterior segmental and subsegmental arterial branches in the left upper lobe. No evidence of right heart strain.\"      Recommendations were communicated to primary team via this note and JEANCARLOS Duff   Division of Nephrology and Hypertension  Vocera Web Console      REASON FOR CONSULT: ESKD/dialysis    HISTORY OF PRESENT ILLNESS:  Blanco Osborne is a 61 yo male with Pmhx ESRD (on HD MWF since 1/23), NAFLD s/p liver transplant (9/2016), PEA arrest 2/2 hypoxic respiratory failure (2022), pAF, CVA, HLD, GERD, CHRISTIAN on CPAP, RLS, and hx seizures. Presented to [facility] ED for hypoxia and AMS. Appeared to be volume overloaded on ED exam, subsequently admitted to [facility] 6/26 for HD and was dialyzed 6/25 in ED for 2L and again 6/26 for UF only run for 1.5L but limited by hypotension. However after HD pt was persistently hypotensive despite midodrine, therefore started on pressors and transferred to [facility] given bed availability. Treating for shock 2/2 presumed infection, also found to have PE. Infectious workup underway, on zosyn and vanc with planned paracentesis. Was initially on NE but now weaned off with BP's in 90's. On 4L O2. Plan HD today for clearance only. Pt is volume up so may need another run this weekend for volume once BP's stabilize. Pt is seen bedside with spouse and brother present. He is alert and conversant, though spouse says he is still \"slowed\" from his usual mentation. He endorses abdominal pain. Denies n/v, CP. He feels some SOB that improved with fluid off on dialysis the last two days.    PAST MEDICAL HISTORY:  Reviewed with patient on 06/27/2025      Past Medical History:   Diagnosis Date    Acquired " immunocompromised state 11/19/2022    Acute renal failure, unspecified acute renal failure type 11/12/2022    Antiplatelet or antithrombotic long-term use     Arrhythmia     PEA    Ascites     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    Cancer (H) 2023    Squamous Cell Cancer- Since resolved    Cirrhosis of liver with ascites (H) 02/11/2016    Coagulopathy     Critical illness myopathy     Dialysis patient     DIALYSIS 3X/ WEEK    Embolic stroke (H) 11/20/2022    Encephalopathy     ESRD (end stage renal disease) on dialysis (H)     Gastroesophageal reflux disease     H/O alcohol abuse     History of blood transfusion     Hyperammonemia     Hyperlipidemia     Hypertension     Patients states that HTN has been resolved    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    Insomnia     Lactic acidosis 11/12/2022    Liver replaced by transplant (H) 09/20/2016    NAFLD (nonalcoholic fatty liver disease) 02/11/2016    CHRISTIAN on CPAP     Parainfluenza type 1 infection 11/19/2022    Parapneumonic effusion     Pleural effusion     Pneumothorax on left 12/16/2022    Respiratory acidosis 11/12/2022    Respiratory arrest (H) 11/12/2022    Restless legs syndrome (RLS)     SBP (spontaneous bacterial peritonitis) (H)     MNGI    Seizures (H) 12/2022    Sepsis due to Streptococcus pneumoniae with acute hypoxic respiratory failure (H) 11/19/2022    Stroke, embolic (H) 11/20/2022    Sudden cardiac arrest (H) 12/29/2022    PEA- 2 min of CPR    Tubular adenoma 10/2019    Large cecal adenoma- due for surveillance colonoscopy in 3 years (10/2022)       Past Surgical History:   Procedure Laterality Date    APPENDECTOMY      BENCH LIVER N/A 09/20/2016    Procedure: BENCH LIVER;  Surgeon: Enoc Crews MD;  Location: [facility] OR    BRONCHOSCOPY FLEXIBLE AND RIGID N/A 12/20/2022    Procedure: BRONCHOSCOPY;  Surgeon: Alena Valenzuela MD;  Location:  GI    COLONOSCOPY  08/06/2013    repeat in 2018    COLONOSCOPY N/A 10/04/2019     Procedure: COLONOSCOPY, WITH POLYPECTOMY AND BIOPSY;  Surgeon: Go Chong MD;  Location: [facility] OR    COLONOSCOPY N/A 3/26/2024    Procedure: COLONOSCOPY, FLEXIBLE, WITH LESION REMOVAL USING SNARE;  Surgeon: Jie Douglas MD;  Location:  GI    CREATE FISTULA ARTERIOVENOUS UPPER EXTREMITY Left 03/21/2023    Procedure: LEFT UPPER EXTREMITY ARTERIOVENOUS FISTULA CREATION. LIGATION OF COMPETING VASCULAR BRANCHES- LEFT;  Surgeon: Deejay Mcneil MD;  Location:  OR    CV CORONARY ANGIOGRAM N/A 2/22/2024    Procedure: Coronary Angiogram;  Surgeon: Nima Dimas MD;  Location:  HEART CARDIAC CATH LAB    CV PERICARDIOCENTESIS N/A 9/29/2023    Procedure: Pericardiocentesis;  Surgeon: Barry Mckeon MD;  Location:  HEART CARDIAC CATH LAB    CV PERICARDIOCENTESIS N/A 10/11/2023    Procedure: Pericardiocentesis;  Surgeon: Ulises Bhakta MD;  Location:  HEART CARDIAC CATH LAB    CV RIGHT HEART CATH MEASUREMENTS RECORDED N/A 10/11/2023    Procedure: Right Heart Catheterization;  Surgeon: Ulises Bhakta MD;  Location:  HEART CARDIAC CATH LAB    HERNIA REPAIR      IR CHEST TUBE PLACEMENT NON-TUNNELED RIGHT  12/12/2022    IR CVC TUNNEL PLACEMENT > 5 YRS OF AGE  12/06/2022    IR CVC TUNNEL PLACEMENT > 5 YRS OF AGE  1/3/2024    IR CVC TUNNEL PLACEMENT > 5 YRS OF AGE  6/23/2025    IR DIALYSIS FISTULOGRAM LEFT  07/13/2023    IR DIALYSIS FISTULOGRAM LEFT  6/23/2025    IR GASTROSTOMY TUBE CHANGE  02/08/2023    IR GASTROSTOMY TUBE PERCUTANEOUS PLCMNT  11/29/2022    IR PARACENTESIS  11/29/2022    IR PARACENTESIS  12/01/2022    IR PARACENTESIS  2/10/2016    IR PARACENTESIS  2/28/2016    IR THORACENTESIS  12/06/2022    IR THORACENTESIS  09/01/2020    IR THORACENTESIS  08/10/2020    IR TRANSCATHETER BIOPSY  12/01/2022    IR TRANSCATHETER BIOPSY  4/9/2024    REVISION FISTULA ARTERIOVENOUS UPPER EXTREMITY Left 8/15/2023    Procedure: 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 2;  OUTFLOW REVISION CEPHALIC TO BRACHIAL VEIN;  Surgeon: Deejay Mcneil MD;  Location: [facility] OR    REVISION FISTULA ARTERIOVENOUS UPPER EXTREMITY Left 1/3/2024    Procedure: Excision and repair of LEFT brachiocephalic arteriovenous fistula and vein patch angioplasty;  Surgeon: Deejay Mcneil MD;  Location: [facility] OR    THROMBECTOMY UPPER EXTREMITY Left 2025    Procedure: Thrombectomy upper extremity, repair of aneurysm, left upper arm;  Surgeon: Deejay Mcneil MD;  Location: [facility] OR    TRACHEOSTOMY N/A 2022    Procedure: TRACHEOSTOMY;  Surgeon: Nilesh Jackson MD;  Location: [facility] OR    TRANSPLANT LIVER RECIPIENT  DONOR N/A 2016    Procedure: TRANSPLANT LIVER RECIPIENT  DONOR;  Surgeon: Enoc Crews MD;  Location: [facility] OR        MEDICATIONS:  PTA Meds  Prior to Admission medications    Medication Sig Last Dose Taking? Auth Provider Long Term End Date   acetaminophen (TYLENOL) 325 MG tablet Take 2 tablets (650 mg) by mouth every 8 hours as needed for other (For optimal non-opioid multimodal pain management to improve pain control.)   Deejay Yoon MD No    aspirin 81 MG EC tablet Take 1 tablet (81 mg) by mouth daily   Stephania Baer, APRN CNP     calcium carbonate (TUMS) 500 MG chewable tablet Take 1 chew tab by mouth 4 times daily as needed for heartburn.   Unknown, Entered By History No    famotidine (PEPCID) 20 MG tablet Take 20 mg by mouth 2 times daily as needed.   Unknown, Entered By History No    gabapentin (NEURONTIN) 100 MG capsule Take 300 mg by mouth nightly as needed for other (RLS).   Unknown, Entered By History No    melatonin 3 MG tablet Take 3 mg by mouth nightly as needed for sleep.   Unknown, Entered By History     midodrine (PROAMATINE) 10 MG tablet TAKE 1 TABLET 3X DAILY. ON DAILYSIS DAYS(M,W,F) TAKE 2 EXTRA TABLETS 1 HOUR BEFORE,1 TABLET WHEN YOU ARRIVE, AND 1 TABLET DURING DIALYSIS.   Ki Carter PA-C No     multivitamin RENAL (TRIPHROCAPS) 1 capsule capsule TAKE 1 CAPSULE BY MOUTH DAILY   Ki Carter PA-C     omeprazole (PRILOSEC) 40 MG DR capsule TAKE 1 CAPSULE(40 MG) BY MOUTH DAILY   Ki Carter PA-C No    oxyCODONE (ROXICODONE) 5 MG tablet Take 1 tablet (5 mg) by mouth every 6 hours as needed for moderate pain.   Lindsay Archibald PA-C No    tacrolimus (GENERIC EQUIVALENT) 1 MG capsule TAKE 1 CAPSULE BY MOUTH EVERY 12 HOURS   Juan Simms MD No    warfarin ANTICOAGULANT (COUMADIN/JANTOVEN) 2 MG tablet Take 8 mg by mouth every Monday and Friday, 6 mg by mouth all other days   Unknown, Entered By History No       Current Meds  Current Facility-Administered Medications   Medication Dose Route Frequency Provider Last Rate Last Admin    aspirin EC tablet 81 mg  81 mg Oral Daily Rashida Olivia MD        lacosamide (VIMPAT) tablet 50 mg  50 mg Oral Daily Rashida Olivia MD        midodrine (PROAMATINE) tablet 10 mg  10 mg Oral TID w/meals Rashida Olivia MD        And    midodrine (PROAMATINE) tablet 20 mg  20 mg Oral Q Mon Wed Fri AM Rashida Olivia MD        pantoprazole (PROTONIX) EC tablet 40 mg  40 mg Oral BID Rashida Olivia MD        piperacillin-tazobactam (ZOSYN) 2.25 g vial to attach to  ml bag  2.25 g Intravenous Q6H Rainer Jones MD        rOPINIRole (REQUIP) tablet 0.5 mg  0.5 mg Oral BID Rashida Olivia MD        tacrolimus (GENERIC EQUIVALENT) capsule 1 mg  1 mg Oral Q12H Rashida Olivia MD        vancomycin (VANCOCIN) 2,250 mg in sodium chloride 0.9 % 500 mL intermittent infusion  2,250 mg Intravenous Once Rainer Jones MD        vancomycin place richards - receiving intermittent dosing  1 each Does not apply See Admin Instructions Rainer Jnoes MD         Infusion Meds  Current Facility-Administered Medications   Medication Dose Route Frequency Provider Last Rate Last Admin    heparin 25,000 units in 0.45% NaCl 250 mL ANTICOAGULANT  infusion  0-5,000 Units/hr Intravenous Continuous Katia Garza 17 mL/hr at 06/27/25 0706 1,700 Units/hr at 06/27/25 0706    norepinephrine (LEVOPHED) 4 mg in  mL PERIPHERAL infusion  0.01-0.2 mcg/kg/min Intravenous Continuous Rashida Olivia MD 10.5 mL/hr at 06/27/25 0512 0.03 mcg/kg/min at 06/27/25 0512       ALLERGIES:    No Known Allergies    REVIEW OF SYSTEMS:  A comprehensive of systems was negative except as noted above.    SOCIAL HISTORY:   Social History     Socioeconomic History    Marital status:      Spouse name: Not on file    Number of children: Not on file    Years of education: Not on file    Highest education level: Not on file   Occupational History    Not on file   Tobacco Use    Smoking status: Never     Passive exposure: Never    Smokeless tobacco: Never   Vaping Use    Vaping status: Never Used   Substance and Sexual Activity    Alcohol use: Not Currently     Alcohol/week: 0.0 standard drinks of alcohol    Drug use: No    Sexual activity: Not Currently     Partners: Female   Other Topics Concern    Parent/sibling w/ CABG, MI or angioplasty before 65F 55M? Not Asked   Social History Narrative    Not on file     Social Drivers of Health     Financial Resource Strain: Low Risk  (1/16/2024)    Financial Resource Strain     Within the past 12 months, have you or your family members you live with been unable to get utilities (heat, electricity) when it was really needed?: No   Food Insecurity: No Food Insecurity (4/18/2025)    Received from [facility]    Hunger Vital Sign     Worried About Running Out of Food in the Last Year: Never true     Ran Out of Food in the Last Year: Never true   Transportation Needs: No Transportation Needs (4/18/2025)    Received from [facility]    PRAPARE - Transportation     Lack of Transportation (Medical): No     Lack of Transportation (Non-Medical): No   Physical Activity: Insufficiently Active (4/18/2025)    Received from [facility]    Exercise Vital  Sign     Days of Exercise per Week: 2 days     Minutes of Exercise per Session: 10 min   Stress: Not on file   Social Connections: Not on file   Interpersonal Safety: Low Risk  (2024)    Interpersonal Safety     Do you feel physically and emotionally safe where you currently live?: Yes     Within the past 12 months, have you been hit, slapped, kicked or otherwise physically hurt by someone?: No     Within the past 12 months, have you been humiliated or emotionally abused in other ways by your partner or ex-partner?: No   Housing Stability: Low Risk  (2025)    Received from [facility]    Housing Stability     What is your living situation today?: I have a steady place to live     Reviewed with patient      FAMILY MEDICAL HISTORY:   Family History   Problem Relation Age of Onset    Coronary Artery Disease No family hx of     Cardiomyopathy No family hx of     Anesthesia Reaction No family hx of     Deep Vein Thrombosis (DVT) No family hx of      No Family history of kidney disease  Reviewed with patient      PHYSICAL EXAM:   Temp  Av.1  F (36.7  C)  Min: 97.9  F (36.6  C)  Max: 98.5  F (36.9  C)      Pulse  Av.5  Min: 54  Max: 150 Resp  Av.2  Min: 11  Max: 34  SpO2  Av.6 %  Min: 79 %  Max: 100 %       BP 97/63   Pulse 66   Temp 98  F (36.7  C) (Axillary)   Resp 16   Wt 93.1 kg (205 lb 4 oz)   SpO2 100%   BMI 27.84 kg/m        Admit Weight: 93.1 kg (205 lb 4 oz)     GENERAL APPEARANCE: NAD  EYES: no scleral icterus, pupils equal  Lymphatics: no cervical or supraclavicular LAD  Pulmonary: diminished  CV: RRR   - Edema trace LE, someone distended abd  GI: soft, tender  MS: no evidence of inflammation in joints, no muscle tenderness  : no gomez  SKIN: no rash, warm, dry, no cyanosis  NEURO: face symmetric, a/o3  Access: tunneled RIJ (newly revised L AVF)    LABS:   I have reviewed the following labs:  CMP  Recent Labs   Lab 25  0248 25  1302 25  0713 25  0650  06/23/25  0621 06/22/25 2004   * 132* 131* 135   < > 128*   POTASSIUM 4.0 4.2 4.3 4.9   < > 4.8   CHLORIDE 92* 91* 91* 94*   < > 91*   CO2 20* 26 24 23   < > 21*   ANIONGAP 18* 15 16* 18*   < > 16*   * 192* 130* 103*   < > 106*   BUN 29.9* 25.4* 46.4* 36.0*   < > 60.4*   CR 6.15* 5.64* 7.96* 6.73*   < > 10.37*   GFRESTIMATED 10* 11* 7* 9*   < > 5*   KELLY 8.9 8.7* 8.5* 8.9   < > 8.6*   MAG  --   --   --   --   --  2.2   PHOS  --   --   --   --   --  5.5*   PROTTOTAL 7.1 7.1  --   --   --   --    ALBUMIN 4.0 4.0  --   --   --   --    BILITOTAL 0.8 1.0  --   --   --   --    ALKPHOS 179* 208*  --   --   --   --    * 147*  --   --   --   --    ALT 37 54  --   --   --   --     < > = values in this interval not displayed.     CBC  Recent Labs   Lab 06/27/25 0344 06/27/25 0248 06/26/25  1302 06/25/25  0713 06/24/25  0650   HGB 7.4* 6.9* 7.6* 7.3* 8.8*   WBC 9.2 7.5 9.2  --  9.2   RBC 2.17* 2.03* 2.25*  --  2.53*   HCT 23.7* 22.0* 24.6*  --  26.2*   * 108* 109* 108* 104*   MCH 34.1* 34.0* 33.8*  --  34.8*   MCHC 31.2* 31.4* 30.9*  --  33.6   RDW 16.1* 15.9* 15.9*  --  15.0   * 97* 105*  --  86*     INR  Recent Labs   Lab 06/27/25 0248 06/26/25  1302 06/25/25  0713 06/24/25  0650   INR 1.47* 1.47* 1.27* 1.49*     ABGNo lab results found in last 7 days.   URINE STUDIES  Recent Labs   Lab Test 11/25/22  1859 10/31/17  1038   COLOR Yellow [place]   APPEARANCE Slightly Cloudy* Slightly Cloudy   URINEGLC Negative Negative   URINEBILI Negative Negative   URINEKETONE Negative 5*   SG 1.017 1.021   UBLD Moderate* Negative   URINEPH 5.5 5.0   PROTEIN 70* Negative   NITRITE Negative Negative   LEUKEST Small* Negative   RBCU  --  <1   WBCU  --  1     Recent Labs   Lab Test 10/31/17  1038   UTPG 0.09     PTH  Recent Labs   Lab Test 03/12/23  0756 03/07/23  1004 02/17/23  0808 01/23/23  0638   PTHI 7* 7* 9* 11*     IRON STUDIES  Recent Labs   Lab Test 10/24/23  0537 04/14/23  0725 04/06/23  0808  03/25/23  1058 03/12/23  0756 01/30/23  0512 12/30/22  0651 12/26/22  0435 12/13/22  0427   IRON 38* 60* 78  --  37* 29* 23* 28* 37   * 210*  --   --  142* 96* 104* 114* 138*   IRONSAT 20 29  --   --  26 30 22 25 27   HOSSEIN 423* 286  --  184 252 502* 426* 252 212       IMAGING:  Reviewed    JEANCARLOS Fowler, Rebeca Barron, saw and evaluated this patient as part of a shared visit.  I have reviewed and discussed with the advanced practice provider their history, physical and plan.  I have personally performed the substantive portion of the medical decision making for this visit - please see the HARRY's documentation for the full details  I personally reviewed the vital signs, medications, labs and imaging.    Key findings and management decisions made by me:  ESRD, liver transplant, admitted with significant hypotenison, PE, ? Infection, ascites being tapped today, 3 liters + removed, which is lijkely some of his 'weight'  Will evalute over weekend for need for volume removal but currently hypotensive and 3 L negative from paracentesis thus HD jjust for clearance today      Rebeca Barron  Date of Service (when I saw the patient): 6/27

## 2025-06-28 ENCOUNTER — APPOINTMENT (OUTPATIENT)
Dept: OCCUPATIONAL THERAPY | Facility: CLINIC | Age: 60
DRG: 175 | End: 2025-06-28
Attending: INTERNAL MEDICINE
Payer: COMMERCIAL

## 2025-06-28 LAB
ALBUMIN SERPL BCG-MCNC: 3.7 G/DL (ref 3.5–5.2)
ALP SERPL-CCNC: 154 U/L (ref 40–150)
ALT SERPL W P-5'-P-CCNC: 23 U/L (ref 0–70)
ANION GAP SERPL CALCULATED.3IONS-SCNC: 13 MMOL/L (ref 7–15)
AST SERPL W P-5'-P-CCNC: 56 U/L (ref 0–45)
BILIRUB SERPL-MCNC: 0.7 MG/DL
BUN SERPL-MCNC: 16.7 MG/DL (ref 8–23)
CALCIUM SERPL-MCNC: 8.8 MG/DL (ref 8.8–10.4)
CHLORIDE SERPL-SCNC: 96 MMOL/L (ref 98–107)
CREAT SERPL-MCNC: 3.7 MG/DL (ref 0.67–1.17)
EGFRCR SERPLBLD CKD-EPI 2021: 18 ML/MIN/1.73M2
ERYTHROCYTE [DISTWIDTH] IN BLOOD BY AUTOMATED COUNT: 16.9 % (ref 10–15)
GLUCOSE BLDC GLUCOMTR-MCNC: 116 MG/DL (ref 70–99)
GLUCOSE BLDC GLUCOMTR-MCNC: 126 MG/DL (ref 70–99)
GLUCOSE BLDC GLUCOMTR-MCNC: 141 MG/DL (ref 70–99)
GLUCOSE SERPL-MCNC: 120 MG/DL (ref 70–99)
HCO3 SERPL-SCNC: 28 MMOL/L (ref 22–29)
HCT VFR BLD AUTO: 24 % (ref 40–53)
HGB BLD-MCNC: 7.4 G/DL (ref 13.3–17.7)
INR PPP: 1.57 (ref 0.85–1.15)
MAGNESIUM SERPL-MCNC: 1.8 MG/DL (ref 1.7–2.3)
MCH RBC QN AUTO: 34.1 PG (ref 26.5–33)
MCHC RBC AUTO-ENTMCNC: 30.8 G/DL (ref 31.5–36.5)
MCV RBC AUTO: 111 FL (ref 78–100)
PHOSPHATE SERPL-MCNC: 2 MG/DL (ref 2.5–4.5)
PLATELET # BLD AUTO: 130 10E3/UL (ref 150–450)
POTASSIUM SERPL-SCNC: 3.8 MMOL/L (ref 3.4–5.3)
PROT SERPL-MCNC: 6.5 G/DL (ref 6.4–8.3)
PROTHROMBIN TIME: 18.6 SECONDS (ref 11.8–14.8)
RBC # BLD AUTO: 2.17 10E6/UL (ref 4.4–5.9)
SODIUM SERPL-SCNC: 137 MMOL/L (ref 135–145)
TACROLIMUS BLD-MCNC: 10.3 UG/L (ref 5–15)
TME LAST DOSE: NORMAL H
TME LAST DOSE: NORMAL H
UFH PPP CHRO-ACNC: 0.58 IU/ML (ref ?–1.1)
UFH PPP CHRO-ACNC: 0.8 IU/ML (ref ?–1.1)
UFH PPP CHRO-ACNC: 0.83 IU/ML (ref ?–1.1)
UFH PPP CHRO-ACNC: 0.98 IU/ML (ref ?–1.1)
WBC # BLD AUTO: 7.4 10E3/UL (ref 4–11)

## 2025-06-28 PROCEDURE — 200N000002 HC R&B ICU UMMC

## 2025-06-28 PROCEDURE — 99292 CRITICAL CARE ADDL 30 MIN: CPT | Mod: FS | Performed by: INTERNAL MEDICINE

## 2025-06-28 PROCEDURE — 250N000009 HC RX 250

## 2025-06-28 PROCEDURE — 250N000012 HC RX MED GY IP 250 OP 636 PS 637

## 2025-06-28 PROCEDURE — 36415 COLL VENOUS BLD VENIPUNCTURE: CPT | Performed by: INTERNAL MEDICINE

## 2025-06-28 PROCEDURE — 83735 ASSAY OF MAGNESIUM: CPT

## 2025-06-28 PROCEDURE — 99291 CRITICAL CARE FIRST HOUR: CPT | Mod: FS | Performed by: INTERNAL MEDICINE

## 2025-06-28 PROCEDURE — 97535 SELF CARE MNGMENT TRAINING: CPT | Mod: GO | Performed by: OCCUPATIONAL THERAPIST

## 2025-06-28 PROCEDURE — 36415 COLL VENOUS BLD VENIPUNCTURE: CPT

## 2025-06-28 PROCEDURE — 250N000013 HC RX MED GY IP 250 OP 250 PS 637

## 2025-06-28 PROCEDURE — 85520 HEPARIN ASSAY: CPT | Performed by: INTERNAL MEDICINE

## 2025-06-28 PROCEDURE — 258N000003 HC RX IP 258 OP 636

## 2025-06-28 PROCEDURE — 85520 HEPARIN ASSAY: CPT | Performed by: SURGERY

## 2025-06-28 PROCEDURE — 250N000011 HC RX IP 250 OP 636

## 2025-06-28 PROCEDURE — 85014 HEMATOCRIT: CPT

## 2025-06-28 PROCEDURE — 36415 COLL VENOUS BLD VENIPUNCTURE: CPT | Performed by: SURGERY

## 2025-06-28 PROCEDURE — 250N000011 HC RX IP 250 OP 636: Performed by: INTERNAL MEDICINE

## 2025-06-28 PROCEDURE — 85610 PROTHROMBIN TIME: CPT

## 2025-06-28 PROCEDURE — 36415 COLL VENOUS BLD VENIPUNCTURE: CPT | Performed by: STUDENT IN AN ORGANIZED HEALTH CARE EDUCATION/TRAINING PROGRAM

## 2025-06-28 PROCEDURE — 84100 ASSAY OF PHOSPHORUS: CPT

## 2025-06-28 PROCEDURE — 80321 ALCOHOLS BIOMARKERS 1OR 2: CPT | Performed by: STUDENT IN AN ORGANIZED HEALTH CARE EDUCATION/TRAINING PROGRAM

## 2025-06-28 PROCEDURE — 82947 ASSAY GLUCOSE BLOOD QUANT: CPT

## 2025-06-28 PROCEDURE — 99233 SBSQ HOSP IP/OBS HIGH 50: CPT | Performed by: INTERNAL MEDICINE

## 2025-06-28 PROCEDURE — 80197 ASSAY OF TACROLIMUS: CPT | Performed by: STUDENT IN AN ORGANIZED HEALTH CARE EDUCATION/TRAINING PROGRAM

## 2025-06-28 PROCEDURE — 85018 HEMOGLOBIN: CPT

## 2025-06-28 PROCEDURE — 97530 THERAPEUTIC ACTIVITIES: CPT | Mod: GO | Performed by: OCCUPATIONAL THERAPIST

## 2025-06-28 RX ORDER — OXYCODONE HYDROCHLORIDE 5 MG/1
5 TABLET ORAL EVERY 4 HOURS PRN
Refills: 0 | Status: DISPENSED | OUTPATIENT
Start: 2025-06-28

## 2025-06-28 RX ADMIN — ASPIRIN 81 MG CHEWABLE TABLET 81 MG: 81 TABLET CHEWABLE at 08:44

## 2025-06-28 RX ADMIN — GABAPENTIN 300 MG: 300 CAPSULE ORAL at 17:51

## 2025-06-28 RX ADMIN — OXYCODONE HYDROCHLORIDE 5 MG: 5 TABLET ORAL at 17:46

## 2025-06-28 RX ADMIN — ACETAMINOPHEN 650 MG: 325 TABLET ORAL at 19:48

## 2025-06-28 RX ADMIN — POTASSIUM PHOSPHATE, MONOBASIC POTASSIUM PHOSPHATE, DIBASIC 9 MMOL: 224; 236 INJECTION, SOLUTION, CONCENTRATE INTRAVENOUS at 12:58

## 2025-06-28 RX ADMIN — ACETAMINOPHEN 650 MG: 325 TABLET ORAL at 08:46

## 2025-06-28 RX ADMIN — MIDODRINE HYDROCHLORIDE 20 MG: 5 TABLET ORAL at 17:46

## 2025-06-28 RX ADMIN — OXYCODONE HYDROCHLORIDE 5 MG: 5 TABLET ORAL at 23:13

## 2025-06-28 RX ADMIN — LACOSAMIDE 50 MG: 50 TABLET, FILM COATED ORAL at 08:44

## 2025-06-28 RX ADMIN — HEPARIN SODIUM 1900 UNITS/HR: 10000 INJECTION, SOLUTION INTRAVENOUS at 10:30

## 2025-06-28 RX ADMIN — PANTOPRAZOLE SODIUM 40 MG: 40 TABLET, DELAYED RELEASE ORAL at 08:44

## 2025-06-28 RX ADMIN — Medication 5 MG: at 17:51

## 2025-06-28 RX ADMIN — PIPERACILLIN AND TAZOBACTAM 2.25 G: 2; .25 INJECTION, POWDER, FOR SOLUTION INTRAVENOUS at 17:46

## 2025-06-28 RX ADMIN — TACROLIMUS 1 MG: 1 CAPSULE ORAL at 19:44

## 2025-06-28 RX ADMIN — PANTOPRAZOLE SODIUM 40 MG: 40 TABLET, DELAYED RELEASE ORAL at 19:44

## 2025-06-28 RX ADMIN — PIPERACILLIN AND TAZOBACTAM 2.25 G: 2; .25 INJECTION, POWDER, FOR SOLUTION INTRAVENOUS at 04:51

## 2025-06-28 RX ADMIN — PIPERACILLIN AND TAZOBACTAM 2.25 G: 2; .25 INJECTION, POWDER, FOR SOLUTION INTRAVENOUS at 21:54

## 2025-06-28 RX ADMIN — OXYCODONE HYDROCHLORIDE 5 MG: 5 TABLET ORAL at 12:58

## 2025-06-28 RX ADMIN — MIDODRINE HYDROCHLORIDE 20 MG: 5 TABLET ORAL at 23:13

## 2025-06-28 RX ADMIN — NOREPINEPHRINE BITARTRATE 0.04 MCG/KG/MIN: 0.02 INJECTION, SOLUTION INTRAVENOUS at 23:13

## 2025-06-28 RX ADMIN — MIDODRINE HYDROCHLORIDE 20 MG: 5 TABLET ORAL at 08:44

## 2025-06-28 RX ADMIN — ROPINIROLE HYDROCHLORIDE 0.5 MG: 0.5 TABLET, FILM COATED ORAL at 08:44

## 2025-06-28 RX ADMIN — PIPERACILLIN AND TAZOBACTAM 2.25 G: 2; .25 INJECTION, POWDER, FOR SOLUTION INTRAVENOUS at 10:30

## 2025-06-28 RX ADMIN — TACROLIMUS 1 MG: 1 CAPSULE ORAL at 08:44

## 2025-06-28 RX ADMIN — ROPINIROLE HYDROCHLORIDE 0.5 MG: 0.5 TABLET, FILM COATED ORAL at 19:44

## 2025-06-28 RX ADMIN — OXYCODONE HYDROCHLORIDE 5 MG: 5 TABLET ORAL at 08:47

## 2025-06-28 ASSESSMENT — ACTIVITIES OF DAILY LIVING (ADL)
ADLS_ACUITY_SCORE: 64
ADLS_ACUITY_SCORE: 64
ADLS_ACUITY_SCORE: 62
ADLS_ACUITY_SCORE: 64
ADLS_ACUITY_SCORE: 62
ADLS_ACUITY_SCORE: 64
ADLS_ACUITY_SCORE: 62
ADLS_ACUITY_SCORE: 64

## 2025-06-28 NOTE — PROGRESS NOTES
Nephrology Progress Note  06/28/2025         Assessment & Recommendations:   Blanco Osborne is a 59 yo male with Pmhx ESRD (on HD MWF since 1/23), NAFLD s/p liver transplant (9/2016), PEA arrest 2/2 hypoxic respiratory failure (2022), pAF, CVA, HLD, GERD, CHRISTIAN on CPAP, RLS, and hx seizures. Presented to [facility] ED for hypoxia and AMS. Appeared to be volume overloaded on ED exam, subsequently admitted to [facility] 6/26 for HD. However after HD pt was persistently hypotensive despite midodrine, therefore started on pressors and transferred to [facility] given bed availability. Treating for shock 2/2 presumed infection, also found to have PE.     #ESKD: dialyzes MWF at [facility] with Dr. Bradshaw. Access: tunneled RIJ (clotted L AVF s/p revision with Dr Mcneil 6/24/25, not to be used for 1 month). Run time: 3.5 hrs. TW 89.5 kg. Active on tx list here and is also being evaluated at [place]  - Pt had HD 6/25, UF only run 6/26 an HD on 6/27 with no UF  - no urgent need for hemodialysis today, will plan on next HD to occur on Monday per his outine schedule.  Ideally, hemodynamics will have improved at that point but will plan on HD regardless of whether he remains on low dose NE     #BP/Volume: anuric, TW 89.5 kg but has been coming off HD at 90.5 kg since 6/13  - hx of intradialytic hypotension   - per [facility], BP's remain in 100's generally throughout HD  - UF 2L on 6/25, 1.5L 6/26, limited by hypotension  - now intermittently on low dose NE  - evidence of volume overload on CXR and echo  - O2 100% on 4L  - echo 6/27 with EF 60-65%, flattened septum c/w R ventricular pressure and volume overload (also in setting of new PE), IVC diameter and resp changes in intermediate range; no pericardial effusion  -~3 L paracentesis on 6/27.  Will consider gentle UF with next HD planned for 6/30.    -now on midodrine 20 mg TID     #Anemia of CKD: on mircera 30 mcg y9mlenn  - hgb 7's.  Has had some blood loss related to  "dialysis access-related isues  - ordered epogen 4000 units per HD  - transfusion per primary team     #Acid/base: HCO3 28. No current issues.     #Electrolytes: K 43.8, Na 137     #BMD: Ca 8's, alb 43.7, phos low at 2  -no need to replace phos if patient tolerating diet     #ID: Initially on vanc/zosyn now only on zosyn for possible pneumonia.      #New PE: on heparin  - CT with \"PE in posterior segmental and subsegmental arterial branches in the left upper lobe. No evidence of right heart strain.\"    Esteban Ascencio MD   Division of Nephrology and Hypertension  [employer] Web Console    Interval History :   Nursing and provider notes from last 24 hours reviewed.  In the last 24 hours Blanco Osborne remains on intermittent low dose norepinephrine.  Midodrine increased to 20 mg TID. He appears confused at times.  He continues on heparin for PE. Vanco now discontinued and remains on zosyn currently planned for a 7 day course. ~3 L removed with paracentesis today.  He denies dyspnea, chest pain and abdominal pain.      Review of Systems:   A comprehensive ROS was obtained and unremarkable other than that mentioned in the interval history.      Physical Exam:   I/O last 3 completed shifts:  In: 1409.99 [I.V.:699.99; IV Piggyback:710]  Out: 0    BP 96/58 (BP Location: Right arm)   Pulse 68   Temp 97.8  F (36.6  C) (Axillary)   Resp 22   Wt 93.1 kg (205 lb 4 oz)   SpO2 97%   BMI 27.84 kg/m       GENERAL APPEARANCE: NAD  EYES: no scleral icterus, pupils equal  Lymphatics: no cervical or supraclavicular LAD  Pulmonary: diminished  CV: RRR   - Edema trace LE, someone distended abd  GI: soft, tender  MS: no evidence of inflammation in joints, no muscle tenderness  : no gomez  SKIN: no rash, warm, dry, no cyanosis  NEURO: face symmetric, a/o3  Access: tunneled RIJ (newly revised L AVF)    Labs:   All labs reviewed by me  Electrolytes/Renal -   Recent Labs   Lab Test 06/28/25  0540 06/28/25  0353 " 06/27/25  2355 06/27/25  2112 06/27/25  0248 06/27/25  0140 06/26/25  1302 06/23/25  0621 06/22/25  2004 10/05/23  0251 10/05/23  0245     --   --   --  130*  --  132*   < > 128*   < > 136   POTASSIUM 3.8  --   --   --  4.0  --  4.2   < > 4.8   < > 3.8   CHLORIDE 96*  --   --   --  92*  --  91*   < > 91*   < > 90*   CO2 28  --   --   --  20*  --  26   < > 21*   < > 24   BUN 16.7  --   --   --  29.9*  --  25.4*   < > 60.4*   < > 44.5*   CR 3.70*  --   --   --  6.15*  --  5.64*   < > 10.37*   < > 5.77*   * 126* 141*   < > 124*   < > 192*   < > 106*   < > 127*   KELLY 8.8  --   --   --  8.9  --  8.7*   < > 8.6*   < > 9.4   MAG 1.8  --   --   --   --   --   --   --  2.2  --  2.1   PHOS 2.0*  --   --   --  4.8*  --   --   --  5.5*   < > 5.2*    < > = values in this interval not displayed.       CBC -   Recent Labs   Lab Test 06/28/25  1306 06/27/25  0344 06/27/25  0248   WBC 7.4 9.2 7.5   HGB 7.4* 7.4* 6.9*   * 110* 97*       LFTs -   Recent Labs   Lab Test 06/28/25  0540 06/27/25  0248 06/26/25  1302   ALKPHOS 154* 179* 208*   BILITOTAL 0.7 0.8 1.0   ALT 23 37 54   AST 56* 113* 147*   PROTTOTAL 6.5 7.1 7.1   ALBUMIN 3.7 4.0 4.0       Iron Panel -   Recent Labs   Lab Test 10/24/23  0537 04/14/23  0725 04/06/23  0808 03/25/23  1058 03/12/23  0756   IRON 38* 60* 78  --  37*   IRONSAT 20 29  --   --  26   HOSSEIN 423* 286  --    < > 252    < > = values in this interval not displayed.         Imaging:  All imaging reviewed by me.     Current Medications:  Current Facility-Administered Medications   Medication Dose Route Frequency Provider Last Rate Last Admin    aspirin EC tablet 81 mg  81 mg Oral Daily Rashida Olivia MD   81 mg at 06/28/25 0844    lacosamide (VIMPAT) tablet 50 mg  50 mg Oral Daily Rashida Olivia MD   50 mg at 06/28/25 0844    midodrine (PROAMATINE) tablet 20 mg  20 mg Oral Q8H Katia aGrza   20 mg at 06/28/25 0844    pantoprazole (PROTONIX) EC tablet 40 mg  40 mg Oral BID Rashida Olivia,  MD   40 mg at 06/28/25 0844    piperacillin-tazobactam (ZOSYN) 2.25 g vial to attach to  ml bag  2.25 g Intravenous Q6H Rainer Jones MD   2.25 g at 06/28/25 1030    potassium phosphate 9 mmol in 250 mL NS intermittent infusion  9 mmol Intravenous Once Darline Tuttle APRN CNP   9 mmol at 06/28/25 1258    rOPINIRole (REQUIP) tablet 0.5 mg  0.5 mg Oral BID Rashida Olivia MD   0.5 mg at 06/28/25 0844    tacrolimus (GENERIC EQUIVALENT) capsule 1 mg  1 mg Oral Q12H Rashida Olivia MD   1 mg at 06/28/25 0844     Current Facility-Administered Medications   Medication Dose Route Frequency Provider Last Rate Last Admin    heparin 25,000 units in 0.45% NaCl 250 mL ANTICOAGULANT infusion  0-5,000 Units/hr Intravenous Continuous Katia Garza 19 mL/hr at 06/28/25 1030 1,900 Units/hr at 06/28/25 1030    norepinephrine (LEVOPHED) 4 mg in  mL PERIPHERAL infusion  0.01-0.2 mcg/kg/min Intravenous Continuous Laine Rosas MD 7 mL/hr at 06/28/25 1416 0.02 mcg/kg/min at 06/28/25 1416     Esteban Ascencio MD

## 2025-06-28 NOTE — PLAN OF CARE
ICU End of Shift Summary. See flowsheets for vital signs and detailed assessment.    Changes this shift: No acute changes.  On and off levophed.  Pt reported feeling better today.      Plan: Delirium precautions.      Goal Outcome Evaluation:      Plan of Care Reviewed With: patient    Overall Patient Progress: no changeOverall Patient Progress: no change    Outcome Evaluation: VSS

## 2025-06-28 NOTE — PROGRESS NOTES
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 Persistently hypotensive with HD at [facility] and was subsequently transferred to [facility] MICU. Found to have PE without heart strain.    CHANGES and MAJOR THINGS TODAY:   - Change BP goal for NorEpi infusion to SBP >90  - Wean NorEpi as able  - Stop vanco, keep zosyn for 7 days     PLAN:    Neuro:  # AMS  # Lethargy  #Hx seizures   Patient A&Ox3 but tired. Suspect AMS/lethargy in setting of toxic metabolic encephalopathy 2/2 metabolic derangements associated with ESRD and hypotension. Follows with neurology outpatient: last appt was 10/24. He had a seizure episode in 2022 in setting of admission for septic shock; prolonged video EEG was performed 12/20-23/2022 at [facility]. The patient had 2 seizures during this monitoring. Reportedly sx were gaze deviation, facial twitching and grimacing. He was started on lacosamide and levetiracetam- levetiracetam has since been tapered off.   - Head CT negative for acute pathology   - TSH normal, ethyl alcohol level negative   - Continue lacosamide 50mg daily (of note, [facility] note states that pt takes every other day as he is being \"weaned off\" by his neurologist\". Per 10/24 note review, neurology advised pt to stay on this medication).   - Lacosamide level - in process   - Delirium prevention and precautions      #Hx CVA  His MRI 12/16/2022 showed chronic right parietal encephalomalacia and multiple lacunar strokes in the left frontal, left temporal and posterior right frontal lobe, consistent with embolic strokes. No gross deficits on exam.   - PTA ASA 81mg       #RLS  # Acute pain   - PTA " ropinirole 0.5mg BID  - Acetaminophen 650 mg every 8 hrs PRN   - Oxycodone 5 mg q6hrs PRN   - Gabapentin 300 mg at bedtime PRN   - Melatonin PRN at bedtime      Pulmonary:  # AHRF  # Pulmonary Embolism  # Bilateral pleural effusions (Trace L, chronic small R)  # Hx of CHRISTIAN, unclear if uses CPAP    Presented to [facility] with hypoxia, new O2 requirement. BNP elevated compared to prior (18.3k compared to 4.3k 4 days prior). CXR with pulmonary vascular congestion. CT AP showed trace left pleural effusion with adjacent atelectasis, chronic small right pleural effusion with associated pleural thickening and adjacent rounded atelectasis. Underwent UF with 1.5L off with subsequent improvement in O2 needs (4L --> 2L).   No fever, elevated WBC or left shift (though notably immunosuppressed in setting of transplant). Lactic acid wnl (1.4). Most likely secondary to PE. However, given report of hypoxia and subsequent hypotension with HD, concern for respiratory infection- HAP given history of hospitalizations.  - CT PE w/contrast positive for acute PE involving left upper lobe. No evidence of heart strain on CT.  - Heparin infusion for PE treatment   - HAP txt: stop vanco, continue zosyn for 7 days   - Supplemental oxygen to keep Spo2 > 92%     Cardiovascular:  # Hypotension, acute on chronic    Pt has ongoing concerns with chronic hypotension: typically takes midodrine on dialysis days and sometime he takes extra doses as needed for symptoms. His average systolic BP is 80s-90s and he can feel asymptomatic with BP in the 80s. Notably after UF 6/26 with persistently low BPs despite 50mg midodrine total- required norepi initiation. No fever, elevated WBC or left shift (though notably immunosuppressed in setting of transplant). Lactic acid wnl.   - Remains on low dose NorEpi gtt  - Change goal to SBP goal of 90   - Midodrine 20 mg q8hrs     # Tricuspid Insufficiency  # CAD  # Hx of pericardial effusion 2023, s/p pericardiocentesis  x 2 in 9/23 + 10/23  - PTA ASA 81      # Hx paroxysmal Afib  Currently in the work up for a watchman. Per note review, pt has held warfarin for some of the workup, though per family should be taking this med,  - Continuous telemetry   - Hold PTA warfarin  - On heparin infusion      Echo 6/27/2025  Global and regional left ventricular function is normal with an EF of 60-65%.  Left ventricular diastolic function is normal.  Flattened septum is consistent with right ventricular pressure and volume  overload.  The right ventricle is normal size.  Global right ventricular function is borderline reduced.  Moderate to severe tricuspid insufficiency is present. TR is central and  appear functional, pulm HTN?  The right ventricular systolic pressure is 28mmHg above the right atrial  pressure.  IVC diameter and respiratory changes fall into an intermediate range  suggesting an RA pressure of 8 mmHg.  No pericardial effusion is present.    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 He has had ascites now several years requiring intermittent paracenteses.   Per report, pt has a number of features consistent with clinically significant portal hypertension. Repeat pressure measurements showed HVPG of 12-13 mmHg. Review of imaging shows evidence of abdominal varices, splenomegaly, and a nodular-appearing liver with ascites. Total protein in ascitic fluid has varied to greater than or less than 2.5 with SAAG >1.1. Prior colonoscopy did show rectal varices, per report. He has not had a recent upper endoscopy. Portal and hepatic veins are patent. Notably, he had a liver biopsy in our system 4/09/2025 showed severe hepatitis with many plasma cells, activity grade 3-4  /4; likely mild fibrosis. Hepatology suspects that NRH is contributing factor to his underlying portal hypertension- this along with his ESRD and difficulty pulling fluid during dialysis all contributing to his ascites.  - Has hx SBP: last culture data visible is in 2022, lactobacillus, notably not on prophylactic cipro   - Underwent paracentesis on 6/27- removed 3.1 L of fluid. Given 25 g 25% albumin following removal   - Hepatology consulted, appreciate recs  Recs 6/27:  - Diagnostic paracentesis (cell count, differential, culture, protein, albumin cytology)  - Follow up blood, urine, paracentesis cultures   - Age appropriate malignancy screening given venous thromboses   - Management of PE per primary team   - Continue tacrolimus 1 mg BID  - Follow up tacrolimus level (goal 5-7)  - PETH (ordered)  - Hepatology will continue to follow   - Continue PTA tacrolimus: 1mg BID               - tacro level pending      # Hernias   # GERD  He has a left large inguinal hernia, a right small inguinal hernia, an umbilical hernia, and a superior ventral hernia. No surgical interventions planned given high risk of recurrence and post-op complications if attempted to repair.  - PTA PPI      Renal/Fluids/Electrolytes:  # ESRD on HD  # Hypotension with HD  Pt has required hemodialysis since 11/2022 due to an acute kidney injury during a hospitalization for respiratory failure and sepsis. Per review, HD is difficult for him to handle given hypotension- requires midodrine with HD.   Currently being evaluated at [place] for kidney transplant (though key question, given poor hepatic function, is whether he should proceed with kidney transplant alone).   Dry weight list as 89.5kg- current weight recorded is 93.1kg.   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 from 6/22-6/25 for transient hypoxemia in setting of missed HD- he was unable to get HD run as fistula not funtioning. He then underwent fistulogram with IR 6/23 with large thrombus noted and ultimately inability to keep fistula open. Subsequently tunnel cath placed via IR 6/23 & he underwent thrombectomy with repair of fistula aneurysms with Dr Mcneil 6/24. Discharged with plan for scheduled HD on 6/27.   - Tunneled HD line in place     Endocrine:  No acute concerns.      ID:  # C/f HAP  # C/F SBP, ruled out   Presented with hypoxia and subsequent hypotension after HD. Abdomen tender, along with history of SBP concerning for new peritoneal infection. Given history of hypoxia and multiple hospitalizations preceding this one, concern for HAP as well.  - CTM fever curve and WBC trend  - Stop vanco today, keep zosyn x7 days    - Abx:               - zosyn 6/27- present (plan for 7 days                - vancomycin 6/27- 6/28    - Ceftriaxone 6/27  - Cultures               - Bcx 6/26 ([facility]): NGTD   - Paracentsis culture- NGTD   - Para diff- absolute neutrophils 73.2    - MRSA- negative   - Sputum ordered, has not been collected      Hematology:    # Chronic macrocytic anemia  # Chronic thrombocytopenia, likely 2/2 liver dysfunction    Per review, baseline Hgb around 8-9- likely in setting of anemia of renal disease. On admission, Hgb lower at 7.6. No s/s bleeding.   - Daily Hgb checks  - Transfuse for Hgb <7  - EPO per nephrology    Musculoskeletal:  # Weakness/deconditioning   - PT/OT     Skin:  No acute concerns      General Cares/Prophylaxis:    DVT Prophylaxis: heparin gtt   GI Prophylaxis: PPI  Restraints: None  Family Communication: Will call wife Ofe   Code Status: FULL     Lines/tubes/drains:  - HD tunneled line places 6/23  - PIV x2 RUE 6/26     Disposition:  - Medical ICU      Patient seen and findings/plan discussed with medical ICU staff, Dr. Kilpatrick.    STEFFANY Ritchie CNP    Critical Care time: 50 minutes; on vasopressors, critically ill     Clinically Significant Risk Factors         # Hyponatremia: Lowest Na = 130 mmol/L in last 2 days, will monitor as appropriate  # Hypochloremia: Lowest Cl = 91 mmol/L in last 2 days, will monitor as appropriate         # Coagulation Defect: INR = 1.47 (Ref range: 0.85 - 1.15) and/or PTT = N/A, will monitor for bleeding  # Thrombocytopenia: Lowest platelets = 97 in last 2 days, will monitor for bleeding   # Hypertension: Noted on problem list            # Overweight: Estimated body mass index is 27.84 kg/m  as calculated from the following:    Height as of 6/24/25: 1.829 m (6').    Weight as of this encounter: 93.1 kg (205 lb 4 oz)., PRESENT ON ADMISSION       # Financial/Environmental Concerns:        ====================================  INTERVAL HISTORY:   No acute events overnight. Remains on low dose NorEpi.     OBJECTIVE:   1. VITAL SIGNS:   Temp:  [96.6  F (35.9  C)-98.2  F (36.8  C)] 97.6  F (36.4  C)  Pulse:  [67-92] 67  Resp:  [12-32] 23  BP: ()/(19-91) 109/91  SpO2:  [84 %-100 %] 98 %  Resp: 23  2. INTAKE/ OUTPUT:   I/O last 3 completed shifts:  In: 1527.78 [I.V.:817.78; IV Piggyback:710]  Out: 0     3. PHYSICAL EXAMINATION:  General: Patient is chronically ill-appearing in no acute distress.  HEENT: Normocephalic, atraumatic  Neuro: Awake, A&Ox3, asterixis present, 5/5 strength in BLE and BUE  Pulm/Resp: On 2L NC, sitting up in bed. Coarse lung sounds bilaterally   CV: RRR, capillary refill <2 seconds  Abdomen: Mild non-focal tenderness, soft on palpation   : Patient anuric  Incisions/Skin: Bruising to the LUE (site of fistula recannulation), skin otherwise dry    4. LABS:   Arterial Blood Gases   No lab results found in last 7 days.  Complete Blood Count   Recent Labs   Lab 06/27/25  0344 06/27/25  0248 06/26/25  1302  06/25/25  0713 06/24/25  0650   WBC 9.2 7.5 9.2  --  9.2   HGB 7.4* 6.9* 7.6* 7.3* 8.8*   * 97* 105*  --  86*     Basic Metabolic Panel  Recent Labs   Lab 06/28/25  0540 06/28/25  0353 06/27/25  2355 06/27/25  2112 06/27/25  0248 06/27/25  0140 06/26/25  1302 06/25/25  0713     --   --   --  130*  --  132* 131*   POTASSIUM 3.8  --   --   --  4.0  --  4.2 4.3   CHLORIDE 96*  --   --   --  92*  --  91* 91*   CO2 28  --   --   --  20*  --  26 24   BUN 16.7  --   --   --  29.9*  --  25.4* 46.4*   CR 3.70*  --   --   --  6.15*  --  5.64* 7.96*   * 126* 141* 113* 124*   < > 192* 130*    < > = values in this interval not displayed.     Liver Function Tests  Recent Labs   Lab 06/28/25  0540 06/27/25  0248 06/26/25  1302 06/25/25  0713 06/24/25  0650   AST 56* 113* 147*  --   --    ALT 23 37 54  --   --    ALKPHOS 154* 179* 208*  --   --    BILITOTAL 0.7 0.8 1.0  --   --    ALBUMIN 3.7 4.0 4.0  --   --    INR  --  1.47* 1.47* 1.27* 1.49*     Coagulation Profile  Recent Labs   Lab 06/27/25  0248 06/26/25  1302 06/25/25  0713 06/24/25  0650   INR 1.47* 1.47* 1.27* 1.49*       5. RADIOLOGY:   Recent Results (from the past 24 hours)   XR Chest 2 Views    Narrative    EXAM: XR CHEST 2 VIEWS  LOCATION: [facility]  DATE: 6/26/2025    INDICATION: Shortness of breath  COMPARISON: Chest radiograph 6/22/2025      Impression    IMPRESSION: Cardiac silhouette is enlarged, not significantly changed from prior exam. Tunneled dialysis catheter with tip overlying the right atrium. There is pulmonary vascular congestion with small bilateral pleural effusions, right greater than left.   No lobar airspace consolidation or pneumothorax. No acute bony abnormality.   CT Head w/o Contrast    Narrative    EXAM: CT HEAD W/O CONTRAST  LOCATION: [facility]  DATE: 6/26/2025    INDICATION: ams  COMPARISON: 11/3/2024 head CT.  TECHNIQUE: Routine CT Head without IV contrast.  Multiplanar reformats. Dose reduction techniques were used.    FINDINGS:  INTRACRANIAL CONTENTS: No intracranial hemorrhage, extraaxial collection, or mass effect.  No CT evidence of acute infarct. Mild presumed chronic small vessel ischemic changes. Mild generalized volume loss. No hydrocephalus.     VISUALIZED ORBITS/SINUSES/MASTOIDS: No intraorbital abnormality. No paranasal sinus mucosal disease. No middle ear or mastoid effusion.    BONES/SOFT TISSUES: No acute abnormality.      Impression    IMPRESSION:  1.  Age-related changes as above with no acute intracranial abnormality.     CT Abdomen Pelvis w Contrast    Narrative    EXAM: CT ABDOMEN PELVIS W CONTRAST  LOCATION: [facility]  DATE: 6/26/2025    INDICATION: New abdominal pain and hypotension.  COMPARISON: CT chest abdomen and pelvis 11/3/2024.  TECHNIQUE: CT scan of the abdomen and pelvis was performed following injection of IV contrast. Multiplanar reformats were obtained. Dose reduction techniques were used.  CONTRAST: 103 mL Isovue 370    FINDINGS:     LOWER CHEST: Bilateral gynecomastia. Chronic small right pleural effusion with associated pleural thickening and calcifications. Right lower lobe calcified granuloma and chronic right lower lobe rounded atelectasis appear similar. Trace left pleural   effusion with adjacent atelectasis. Cardiomegaly.    HEPATOBILIARY: Status post liver transplant. Lobulated contours of the transplanted liver appear similar. A tiny left hepatic cyst is unchanged. No new liver lesions. No biliary ductal dilation.    PANCREAS: A 0.8 cm cystic lesion within the pancreatic body is unchanged (3/#78). No dilation of the main pancreatic duct.    SPLEEN: Splenomegaly measuring 18.3 cm.    ADRENAL GLANDS: Normal.    KIDNEYS/BLADDER: Atrophic kidneys. Nonobstructing right upper pole renal calculus measuring 0.5 cm. Multiple nonobstructing left renal calculi measuring up to 0.3 cm. Multiple subcentimeter  hypodense renal lesions which are too small to characterize and   do not require specific follow-up. No hydronephrosis. Bladder is decompressed.    BOWEL: No evidence of bowel obstruction or inflammation. Appendix is absent.    PERITONEUM/RETROPERITONEUM: Moderate to large amount of ascites.     LYMPH NODES: No enlarged lymph node. Scattered prominent lymph nodes are present within the central mesentery are likely reactive in nature.    VASCULATURE: Patent portal, splenic, and superior mesenteric veins. Mild aortobiiliac atherosclerosis. No abdominal aortic aneurysm.    PELVIC ORGANS: Normal.    MUSCULOSKELETAL: Large left inguinal hernia containing ascites. Shallow lower ventral abdominal wall hernias containing ascites. Moderate midline upper abdominal wall hernia containing ascites and a small amount of fat. Multiple old healed rib fractures.   Multilevel degenerative change of the spine. Unchanged compression deformities of the L1 and T10 vertebral bodies. No acute bony abnormality.      Impression    IMPRESSION:     1.  No acute process in the abdomen or pelvis.    2.  Moderate to large amount of ascites.    3.  Trace left pleural effusion with adjacent atelectasis. Chronic small right pleural effusion with associated pleural thickening and adjacent rounded atelectasis.    4.  Atrophic kidneys with multiple bilateral nonobstructing intrarenal calculi. No hydronephrosis.    5.  Additional nonemergent findings as described in the report body.   CT Chest Pulmonary Embolism w Contrast   Result Value    Radiologist flags Pulmonary emboli in posterior segmental and (Urgent)    Narrative    EXAM: CTA pulmonary angiogram, 6/27/2025 6:31 AM    HISTORY: Concern for PE- hypoxia, BNP elevation, hx PE not taking AC  reliably    COMPARISON: CT CAP 11/3/2024.    TECHNIQUE: Volumetric CT images obtained through the chest with  contrast. Coronal and axial MIP reformatted images obtained.  Three-dimensional (3D) post-processed  angiographic images were  reconstructed, archived to PACS and used in interpretation of this  study.     CONTRAST: 68 ml isovue 370 IV.    FINDINGS:   Vascular: There is good contrast opacification of the pulmonary  arterial vasculature. Filling defects in posterior segmental and  subsegmental arterial branches in the left upper lobe. Heart is normal  size without pericardial effusion. No evidence of right heart strain.  Similar dilation of the main pulmonary artery up to 4.2 cm. No  thoracic aortic aneurysm. Bovine configuration of the great vessels.  Mild calcified plaque in the aortic arch.    Remaining Chest: Central tracheobronchial tree is patent. Dense  consolidation of the majority of the right lower lobe. Multifocal  subsegmental atelectasis and/or scarring in the right lung. Scattered  patchy consolidative opacities throughout the mid and lower right  lung. Left basilar atelectasis. No pneumothorax. Small right pleural  effusion. Pleural thickening predominantly in the superior right  hemithorax. Prominent and mildly enlarged paratracheal lymph nodes,  for example a right lower paratracheal lymph node measuring up to 1.2  cm in short axis (4/134). Stable pleural calcifications in the right  base.    Upper Abdomen: Partially visualized hyperattenuating fluid in the left  upper quadrant.    Bones/Soft Tissues: No acute abnormalities or suspicious lesions.  Chronic fracture deformities of bilateral anterior ribs and mid  sternal body. Multilevel degenerative changes of the visualized spine.  Bilateral gynecomastia.      Impression    IMPRESSION:   1. Exam is positive for acute pulmonary embolism. Pulmonary emboli in  posterior segmental and subsegmental arterial branches in the left  upper lobe. No evidence of right heart strain.   2. Dense consolidation of the right lower lobe, likely atelectasis but  infection cannot be entirely excluded.  3. Scattered patchy consolidative opacities in the mid to lower  right  lung, suggestive of an infectious and/or inflammatory process versus  aspiration.  4. Small right pleural effusion with additional pleural thickening in  the right hemithorax, predominantly in the superior component.  5. Right upper quadrant ascites.    [Urgent Result: Pulmonary emboli in posterior segmental and  subsegmental branches in the left upper lobe]    Finding was identified on 2025 at 6:34 AM.     Dr. Quinteros was contacted by Dr. Armijo on 2025 at 6:35 AM and  verbalized understanding of the urgent finding.     In the event of a positive result for acute pulmonary embolism  resulting in right heart strain, consider calling the   [facility] hospital  for PERT (Pulmonary Embolism Response Team)  Activation?    PERT -- Pulmonary Embolism Response Team (Multidisciplinary team  including cardiology, interventional radiology, critical care,  hematology)    I have personally reviewed the examination and initial interpretation  and I agree with the findings.    LUIS MIGUEL ADAMS DO         SYSTEM ID:  [id number]   Echocardiogram Complete   Result Value    LVEF  60-65%    Narrative    [id number]  [id number]  [id number]  [id number]^NELI^RALPH^YOLA     [facility],[place]  Echocardiography Laboratory  [address] [place] [id number]     Name: MARY JO CRAIN  MRN: [id number]  : 1965  Study Date: 2025 07:21 AM  Age: 60 yrs  Gender: Male  Patient Location: [facility] [place] [facility]  Reason For Study: SOB, Hypotension  Ordering Physician: RALPH QUINTEROS  Referring Physician: LELA SHEPARD  Performed By: Ruddy Da Silva ELIECER     BSA: 2.2 m2  Height: 72 in  Weight: 205 lb  HR: 70  BP: 97/63 mmHg  ______________________________________________________________________________  Procedure  Echocardiogram with two-dimensional, color and spectral Doppler.  ______________________________________________________________________________  Interpretation Summary  Global and regional left  ventricular function is normal with an EF of 60-65%.  Left ventricular diastolic function is normal.  Flattened septum is consistent with right ventricular pressure and volume  overload.  The right ventricle is normal size.  Global right ventricular function is borderline reduced.  Moderate to severe tricuspid insufficiency is present. TR is central and  appear functional, pulm HTN?  The right ventricular systolic pressure is 28mmHg above the right atrial  pressure.  IVC diameter and respiratory changes fall into an intermediate range  suggesting an RA pressure of 8 mmHg.  No pericardial effusion is present.     This study was compared with the study from 1/11/2024 .  TR is new, RA pressure increased.     ______________________________________________________________________________  Left Ventricle  Left ventricular size is normal. Left ventricular wall thickness is normal.  Global and regional left ventricular function is normal with an EF of 60-65%.  Left ventricular diastolic function is normal. Flattened septum is consistent  with right ventricular pressure and volume overload.     Right Ventricle  The right ventricle is normal size. Global right ventricular function is  borderline reduced.     Atria  The right atria appears normal. Moderate left atrial enlargement is present.     Mitral Valve  Mild mitral annular calcification is present.     Aortic Valve  Trileaflet aortic sclerosis without stenosis. Trace aortic insufficiency is  present.     Tricuspid Valve  Moderate to severe tricuspid insufficiency is present. The right ventricular  systolic pressure is approximated at 28.7 mmHg plus the right atrial pressure.  The right ventricular systolic pressure is 28mmHg above the right atrial  pressure.     Pulmonic Valve  On Doppler interrogation, there is no significant stenosis or regurgitation.     Vessels  The aorta root is normal. The thoracic aorta is normal. IVC diameter and  respiratory changes fall into an  intermediate range suggesting an RA pressure  of 8 mmHg.     Pericardium  No pericardial effusion is present.     Compared to Previous Study  This study was compared with the study from 2024 . TR is new, RA pressure  increased.  ______________________________________________________________________________  MMode/2D Measurements & Calculations  IVSd: 1.1 cm     LVIDd: 4.3 cm  LVIDs: 2.8 cm  LVPWd: 0.94 cm  FS: 35.1 %  LV mass(C)d: 141.5 grams  LV mass(C)dI: 65.7 grams/m2  asc Aorta Diam: 3.3 cm  LVOT diam: 2.3 cm  LVOT area: 4.3 cm2  Asc Ao diam index BSA (cm/m2): 1.5  Asc Ao diam index Ht(cm/m): 1.8  LA Volume Index (BP): 42.3 ml/m2  RWT: 0.44  TAPSE: 1.5 cm     Doppler Measurements & Calculations  MV E max baljeet: 97.2 cm/sec  MV A max baljeet: 106.0 cm/sec  MV E/A: 0.92  MV dec slope: 535.1 cm/sec2  MV dec time: 0.18 sec  Ao V2 max: 224.6 cm/sec  Ao max P.2 mmHg  Ao V2 mean: 159.5 cm/sec  Ao mean P.3 mmHg  Ao V2 VTI: 51.5 cm  WOLFGANG(I,D): 2.7 cm2  WOLFGANG(V,D): 2.7 cm2  LV V1 max P.9 mmHg  LV V1 max: 140.4 cm/sec  LV V1 VTI: 32.3 cm  SV(LVOT): 138.6 ml  SI(LVOT): 64.4 ml/m2  PA acc time: 0.06 sec  TR max baljeet: 268.1 cm/sec  TR max P.7 mmHg  AV Baljeet Ratio (DI): 0.63  WOLFGANG Index (cm2/m2): 1.3  E/E' av.6  Lateral E/e': 7.8  Medial E/e': 9.4     ______________________________________________________________________________  Report approved by: LYDIA NUNEZ MD on 2025 08:38 AM

## 2025-06-28 NOTE — PLAN OF CARE
ICU End of Shift Summary. See flowsheets for vital signs and detailed assessment.    Changes this shift: Pt A&Ox1, oriented to self. Pt intermittently uncooperative with cares. Pt ripped out PIV x3 while getting out of bed. Per charge nurse, sitter cannot be requested after 8pm, pt across from nursing station with bed alarm on. SR in the 60-70's. MAP goal of 65 maintained with levo @ 0.03. Pt SOB with activity, on 3L via NC. Pt had one large BM. HD run completed, no fluid off. Heparin gtt therapeutic @ 2100.     Plan:  Continue to wean O2 and levo as tolerated.     Goal Outcome Evaluation:      Plan of Care Reviewed With: patient    Overall Patient Progress: no change    Outcome Evaluation: MAP maintained with levo, requiring 2-3L O2 via NC

## 2025-06-28 NOTE — PROGRESS NOTES
[facility]  CRITICAL CARE STAFF NOTE    I am managing these acute and ongoing critical issues resulting in critical condition that impairs one or more vital organ systems, incur a high probability of imminent or life-threatening deterioration in the patient's condition and providing frequent personal assessment and manipulation of medications and life support equipment.     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 Persistently hypotensive with HD at [facility] and was subsequently transferred to [facility] MICU. Found to have PE without heart strain.     Hypotension. Baseline SBP 80-90. Will adjust pressor parameters. Agree with increasing midodrine dose. Feel resuscitation has been adequate. PE under control with anticoagulation. Cont to wean pressors    ICU Interventions: parenteral medications necessitating continuous monitoring including vasoactive medications, medication for heart rhythm control, insulin infusion, and/or sedative/opiates  and interpretation of lab values, cardiac output, cxr, pulse oximetry, blood gases, and/or information/data stored in computers    Clinically Significant Risk Factors         # Hyponatremia: Lowest Na = 130 mmol/L in last 2 days, will monitor as appropriate  # Hypochloremia: Lowest Cl = 91 mmol/L in last 2 days, will monitor as appropriate       # Coagulation Defect: INR = 1.57 (Ref range: 0.85 - 1.15) and/or PTT = N/A, will monitor for bleeding  # Thrombocytopenia: Lowest platelets = 97 in last 2 days, will monitor for bleeding   # Hypertension: Noted on problem list            # Overweight: Estimated body mass index is 27.84 kg/m  as calculated from the following:    Height as of 6/24/25: 1.829 m (6').    Weight as of this  encounter: 93.1 kg (205 lb 4 oz)., PRESENT ON ADMISSION     # Financial/Environmental Concerns:              The patient was seen and examined with the resident/fellow/HARRY and/or medical student.  We have discussed the patient in detail and I agree with the findings, assessment, and plan as documented when this note was cosigned on this day. The plan was formulated in conjunction with pharmacy, ICU nurses, and respiratory therapist. I have evaluated all laboratory values and imaging studies for the past 24 hours. I have reviewed all the consults that have been ordered and are active for this patient.      Critical Care Time: 30 min.  I spent this time (excluding procedures) personally providing and directing critical care services at the bedside and on the critical care unit.      Ash Kilpatrick MD  Associate Professor of Medicine  Section of Interventional Pulmonology   Division of Pulmonary, Allergy, Critical Care and Sleep Medicine   [facility]  Pager: [phone]   Office: [phone]  Email: [email or web address]

## 2025-06-29 LAB
ALBUMIN SERPL BCG-MCNC: 3.7 G/DL (ref 3.5–5.2)
ALP SERPL-CCNC: 138 U/L (ref 40–150)
ALT SERPL W P-5'-P-CCNC: 15 U/L (ref 0–70)
ANION GAP SERPL CALCULATED.3IONS-SCNC: 13 MMOL/L (ref 7–15)
AST SERPL W P-5'-P-CCNC: 40 U/L (ref 0–45)
BILIRUB SERPL-MCNC: 0.7 MG/DL
BUN SERPL-MCNC: 27.4 MG/DL (ref 8–23)
CALCIUM SERPL-MCNC: 8.9 MG/DL (ref 8.8–10.4)
CHLORIDE SERPL-SCNC: 98 MMOL/L (ref 98–107)
CREAT SERPL-MCNC: 5.04 MG/DL (ref 0.67–1.17)
EGFRCR SERPLBLD CKD-EPI 2021: 12 ML/MIN/1.73M2
ERYTHROCYTE [DISTWIDTH] IN BLOOD BY AUTOMATED COUNT: 17 % (ref 10–15)
GLUCOSE BLDC GLUCOMTR-MCNC: 121 MG/DL (ref 70–99)
GLUCOSE SERPL-MCNC: 130 MG/DL (ref 70–99)
HCO3 SERPL-SCNC: 26 MMOL/L (ref 22–29)
HCT VFR BLD AUTO: 25.8 % (ref 40–53)
HGB BLD-MCNC: 7.8 G/DL (ref 13.3–17.7)
INR PPP: 1.37 (ref 0.85–1.15)
LACOSAMIDE SERPL-MCNC: 1.4 UG/ML
MAGNESIUM SERPL-MCNC: 1.9 MG/DL (ref 1.7–2.3)
MCH RBC QN AUTO: 34.7 PG (ref 26.5–33)
MCHC RBC AUTO-ENTMCNC: 30.2 G/DL (ref 31.5–36.5)
MCV RBC AUTO: 115 FL (ref 78–100)
PHOSPHATE SERPL-MCNC: 3.6 MG/DL (ref 2.5–4.5)
PLATELET # BLD AUTO: 152 10E3/UL (ref 150–450)
POTASSIUM SERPL-SCNC: 3.9 MMOL/L (ref 3.4–5.3)
PROT SERPL-MCNC: 6.5 G/DL (ref 6.4–8.3)
PROTHROMBIN TIME: 16.7 SECONDS (ref 11.8–14.8)
RBC # BLD AUTO: 2.25 10E6/UL (ref 4.4–5.9)
SODIUM SERPL-SCNC: 137 MMOL/L (ref 135–145)
UFH PPP CHRO-ACNC: 0.6 IU/ML (ref ?–1.1)
UFH PPP CHRO-ACNC: 0.64 IU/ML (ref ?–1.1)
UFH PPP CHRO-ACNC: 0.74 IU/ML (ref ?–1.1)
WBC # BLD AUTO: 6.4 10E3/UL (ref 4–11)

## 2025-06-29 PROCEDURE — 250N000011 HC RX IP 250 OP 636

## 2025-06-29 PROCEDURE — 85610 PROTHROMBIN TIME: CPT

## 2025-06-29 PROCEDURE — 85014 HEMATOCRIT: CPT

## 2025-06-29 PROCEDURE — 84100 ASSAY OF PHOSPHORUS: CPT

## 2025-06-29 PROCEDURE — 85520 HEPARIN ASSAY: CPT | Performed by: INTERNAL MEDICINE

## 2025-06-29 PROCEDURE — 83735 ASSAY OF MAGNESIUM: CPT

## 2025-06-29 PROCEDURE — 99233 SBSQ HOSP IP/OBS HIGH 50: CPT | Performed by: INTERNAL MEDICINE

## 2025-06-29 PROCEDURE — 250N000013 HC RX MED GY IP 250 OP 250 PS 637

## 2025-06-29 PROCEDURE — 200N000002 HC R&B ICU UMMC

## 2025-06-29 PROCEDURE — 99291 CRITICAL CARE FIRST HOUR: CPT | Mod: GC | Performed by: INTERNAL MEDICINE

## 2025-06-29 PROCEDURE — 84155 ASSAY OF PROTEIN SERUM: CPT

## 2025-06-29 PROCEDURE — 36415 COLL VENOUS BLD VENIPUNCTURE: CPT

## 2025-06-29 PROCEDURE — 36415 COLL VENOUS BLD VENIPUNCTURE: CPT | Performed by: INTERNAL MEDICINE

## 2025-06-29 PROCEDURE — 250N000012 HC RX MED GY IP 250 OP 636 PS 637

## 2025-06-29 PROCEDURE — 250N000011 HC RX IP 250 OP 636: Performed by: INTERNAL MEDICINE

## 2025-06-29 RX ADMIN — TACROLIMUS 1 MG: 1 CAPSULE ORAL at 08:07

## 2025-06-29 RX ADMIN — PANTOPRAZOLE SODIUM 40 MG: 40 TABLET, DELAYED RELEASE ORAL at 20:44

## 2025-06-29 RX ADMIN — LACOSAMIDE 50 MG: 50 TABLET, FILM COATED ORAL at 08:07

## 2025-06-29 RX ADMIN — Medication 5 MG: at 20:44

## 2025-06-29 RX ADMIN — ASPIRIN 81 MG CHEWABLE TABLET 81 MG: 81 TABLET CHEWABLE at 08:07

## 2025-06-29 RX ADMIN — TACROLIMUS 1 MG: 1 CAPSULE ORAL at 20:44

## 2025-06-29 RX ADMIN — ONDANSETRON 4 MG: 2 INJECTION INTRAMUSCULAR; INTRAVENOUS at 08:06

## 2025-06-29 RX ADMIN — MIDODRINE HYDROCHLORIDE 20 MG: 5 TABLET ORAL at 16:06

## 2025-06-29 RX ADMIN — GABAPENTIN 300 MG: 300 CAPSULE ORAL at 20:44

## 2025-06-29 RX ADMIN — MIDODRINE HYDROCHLORIDE 20 MG: 5 TABLET ORAL at 08:07

## 2025-06-29 RX ADMIN — PIPERACILLIN AND TAZOBACTAM 2.25 G: 2; .25 INJECTION, POWDER, FOR SOLUTION INTRAVENOUS at 21:01

## 2025-06-29 RX ADMIN — ROPINIROLE HYDROCHLORIDE 0.5 MG: 0.5 TABLET, FILM COATED ORAL at 08:08

## 2025-06-29 RX ADMIN — PIPERACILLIN AND TAZOBACTAM 2.25 G: 2; .25 INJECTION, POWDER, FOR SOLUTION INTRAVENOUS at 04:48

## 2025-06-29 RX ADMIN — OXYCODONE HYDROCHLORIDE 5 MG: 5 TABLET ORAL at 08:07

## 2025-06-29 RX ADMIN — ROPINIROLE HYDROCHLORIDE 0.5 MG: 0.5 TABLET, FILM COATED ORAL at 20:44

## 2025-06-29 RX ADMIN — PANTOPRAZOLE SODIUM 40 MG: 40 TABLET, DELAYED RELEASE ORAL at 08:07

## 2025-06-29 RX ADMIN — HEPARIN SODIUM 1700 UNITS/HR: 10000 INJECTION, SOLUTION INTRAVENOUS at 03:39

## 2025-06-29 RX ADMIN — HEPARIN SODIUM 1700 UNITS/HR: 10000 INJECTION, SOLUTION INTRAVENOUS at 16:25

## 2025-06-29 RX ADMIN — PIPERACILLIN AND TAZOBACTAM 2.25 G: 2; .25 INJECTION, POWDER, FOR SOLUTION INTRAVENOUS at 11:12

## 2025-06-29 RX ADMIN — OXYCODONE HYDROCHLORIDE 5 MG: 5 TABLET ORAL at 20:45

## 2025-06-29 RX ADMIN — PIPERACILLIN AND TAZOBACTAM 2.25 G: 2; .25 INJECTION, POWDER, FOR SOLUTION INTRAVENOUS at 16:07

## 2025-06-29 ASSESSMENT — ACTIVITIES OF DAILY LIVING (ADL)
ADLS_ACUITY_SCORE: 64
ADLS_ACUITY_SCORE: 64
ADLS_ACUITY_SCORE: 42
ADLS_ACUITY_SCORE: 64
ADLS_ACUITY_SCORE: 65
ADLS_ACUITY_SCORE: 64
ADLS_ACUITY_SCORE: 65
ADLS_ACUITY_SCORE: 64
ADLS_ACUITY_SCORE: 42
ADLS_ACUITY_SCORE: 42
ADLS_ACUITY_SCORE: 65
ADLS_ACUITY_SCORE: 64
ADLS_ACUITY_SCORE: 42
ADLS_ACUITY_SCORE: 65
ADLS_ACUITY_SCORE: 42
ADLS_ACUITY_SCORE: 64
ADLS_ACUITY_SCORE: 64
ADLS_ACUITY_SCORE: 42
DEPENDENT_IADLS:: INDEPENDENT
ADLS_ACUITY_SCORE: 64
ADLS_ACUITY_SCORE: 64

## 2025-06-29 NOTE — PROGRESS NOTES
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 Persistently hypotensive with HD at [facility] and was subsequently transferred to [facility] MICU. Found to have PE without heart strain. On exam patient feeling well, no acute concerns.    CHANGES and MAJOR THINGS TODAY:   - Weaning norepinephrine    PLAN:    Neuro:  #AMS  #Lethargy   Patient A&Ox3 but tired. Suspect AMS/lethargy in setting of toxic metabolic encephalopathy 2/2 metabolic derangements associated with ESRD. Consider infection in setting of new abdominal pain and hypotension in patient with ascites and history of SBP. Less likely intracranial pathology given negative CT head + neuro exam nonfocal. Does have hx of seizure, but no reported events per chart (not able to reach family to ask outright), does not appear postictal, WBC and lactate wnl (1.4).   - TSH    - lacosamide level   - ethyl alcohol level (while no history of active use, some notes point out alcohol use post transplant. AST 3x the level of ALT as well, plus evidence of fibrosis and hepatitis on liver biopsy)   - infection txt as below      #Hx seizures  Follows with neurology outpatient: last appt was 10/24. He had a seizure episode in 2022 in setting of admission for septic shock; prolonged video EEG was performed 12/20-23/2022 at [facility]. The patient had 2 seizures during this monitoring. Reportedly sx were gaze deviation, facial twitching and grimacing. He was started on lacosamide and levetiracetam- levetiracetam has since been tapered off.   - Continue lacosamide 50mg daily (of note, [facility] note states that pt takes every other day as he is being  "\"weaned off\" by his neurologist\". Per 10/24 note review, neurology advised pt to stay on this medication).   - Lacosamide level      #Hx CVA  His MRI 12/16/2022 showed chronic right parietal encephalomalacia and multiple lacunar strokes in the left frontal, left temporal and posterior right frontal lobe, consistent with embolic strokes. No gross deficits on exam.   - PTA ASA 81mg       #RLS  - PTA ropinirole 0.5mg BID     Pulmonary:  # AHRF  # Pulmonary Embolism  CT PE w/contrast positive for acute PE involving left upper lobe. No evidence of heart strain on CT.  -Started on high-intensity heparin gtt  #Bilateral pleural effusions (Trace L, chronic small R)   Presented to [facility] with hypoxia, new O2 requirement. BNP elevated compared to prior (18.3k compared to 4.3k 4 days prior). CXR with pulmonary vascular congestion. CT AP showed trace left pleural effusion with adjacent atelectasis, chronic small right pleural effusion with associated pleural thickening and adjacent rounded atelectasis. Underwent UF with 1.5L off with subsequent improvement in O2 needs (4L --> 2L).   No fever, elevated WBC or left shift (though notably immunosuppressed in setting of transplant). Lactic acid wnl (1.4). Most likely secondary to PE. However, given report of hypoxia and subsequent hypotension with HD, concern for respiratory infection- HAP given history of hospitalizations.  Plan:  - HAP txt: zosyn, vanc  - Sputum cx  - NC for O2 support, keep sat > 92%     #CHRISTIAN  Unclear if uses CPAP at home.      Cardiovascular:  #Hypotension   #Hx chronic hypotension  Pt has ongoing concerns with chronic hypotension: typically takes midodrine on dialysis days and sometime he takes extra doses as needed for symptoms. His average systolic BP is 80s-90s and he can feel asymptomatic with BP in the 80s. Notably after UF 6/26 with persistently low BPs despite 50mg midodrine total- required norepi initiation. No fever, elevated WBC or left shift " (though notably immunosuppressed in setting of transplant). Lactic acid wnl.   DDx remains broad: Most likely due to PE, identified on CT 6/27. Also consider thought is SBP given report of abdominal pain, presence of ascites on imaging, history of SBP. Additionally considering HAP given hypoxia. Less likely bleeding though this is possibility given Hgb 9 --> 7- no obvious bleeding noted on exam. He recently had a stress test that had good LVEF so less likely cardiogenic (also warm and well-perfused on exam).   Plan:  - SBP goal >90               - norepi gtt  - Midodrine               - Changed to ICU dosing 20mg Q 8 hours  - Abx:               - Ceftriaxone (6/26)                - Zosyn 6/27- present               - Vancomycin 6/27- present  - Cultures:               - Blood cultures from [facility] in process  - Plan for paracentesis- CAP team consulted     # Tricuspid Insufficiency  New on echocardiogram 6/27/25, as compared to previous study 1/11/24. Appears functional, RV with normal size and normal function (borderline reduced).  - Right ventricular pressure elevated on echo. Left-sided cardiac function within normal limits.  - Consider new portopulmonary hypertension vs other causes of pulmonary hypertension. Less likely primary right heart failure with tricuspid regurgitation out of proportion with reduction in RV function on echo.  - Likely noncontributory to acute presentation.     # Hx of pericardial effusion 2023, s/p pericardiocentesis x 2 in 9/23 + 10/23   Echocardiogram in March 2023 showed normal LVEF, no significant valve disease, no obvious vegetations, but with note of a small posterior pericardial effusion without tamponade. Pt was followed with many serial echocardiograms since the initial effusion was discovered. With this, the effusion continued to develop, and eventually he returned to [facility] for pericardiocentesis on 9/29/23 & another one in Oct 2023. Felt to be due to volume overload due to  renal failure   - ECHO showing EF 60-65%, tricuspid insufficiency     # CAD  Of note, underwent dobutamine stress test 4/25 which was negative for myocardial ischemia. The left ventricular ejection fraction increased from 65% at rest to 75% at peak stress. His RV was mildly dysfunctional with the RVSP was just 26 mmHg and CVP was 5. There was no significant valvulopathy.   - PTA ASA 81      # Hx paroxysmal Afib  Currently in the work up for a watchman. 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 He has had ascites now several years requiring intermittent paracenteses.   Per report, pt has a number of features consistent with clinically significant portal hypertension. Repeat pressure measurements showed HVPG of 12-13 mmHg. Review of imaging shows evidence of abdominal varices, splenomegaly, and a nodular-appearing liver with ascites. Total protein in ascitic fluid has varied to greater than or less than 2.5 with SAAG >1.1. Prior colonoscopy did show rectal varices, per report. He has not had a recent upper endoscopy. Portal and hepatic veins are patent. Notably, he had a liver biopsy in our system 4/09/2025 showed severe hepatitis with many plasma cells, activity grade 3-4 /4; likely mild fibrosis. Hepatology suspects that NRH is contributing factor to his underlying portal hypertension- this along with his ESRD and difficulty pulling fluid during dialysis all  contributing to his ascites.  - Hepatology consulted, appreciate recs  - Continue PTA tacrolimus: 1mg BID               - tacro level in AM  - SBP management: s/p 1 dose ceftriaxone, continuing with zosyn given c/f HAP as well                - Has hx SBP: last culture data visible is in 2022, lactobacillus, notably not on prophylactic cipro   - CAPS consult for paracentesis   - ethyl alcohol level (while no history of active use, some notes point out alcohol use post transplant. AST 3x the level of ALT as well, plus evidence of fibrosis and hepatitis on liver biopsy)      # GERD  - PTA PPI     # Hernias  He has a left large inguinal hernia, a right small inguinal hernia, an umbilical hernia, and a superior ventral hernia. No surgical interventions planned given high risk of recurrence and post-op complications if attempted to repair.      Renal/Fluids/Electrolytes:  # ESRD on HD  # Hypotension with HD  Pt has required hemodialysis since 11/2022 due to an acute kidney injury during a hospitalization for respiratory failure and sepsis. Per review, HD is difficult for him to handle given hypotension- requires midodrine with HD.   Currently being evaluated at [place] for kidney transplant (though key question, given poor hepatic function, is whether he should proceed with kidney transplant alone).   Dry weight list as 89.5kg- current weight recorded is 93.1kg.   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 He then underwent fistulogram with IR 6/23 with large thrombus noted and ultimately inability to keep fistula open. Subsequently tunnel cath placed via IR 6/23 & he underwent thrombectomy with repair of fistula aneurysms  with Dr Mcneil 6/24. Discharged with plan for scheduled HD on 6/27.   - Tunneled HD line in place     Endocrine:  No acute concerns.      ID:  # C/f infection: HAP, SBP  Presented with hypoxia and subsequent hypotension after HD. Abdomen tender, along with history of SBP concerning for new peritoneal infection. Given history of hypoxia and multiple hospitalizations preceding this one, concern for HAP as well.  - s/p ceftriaxone dose in [facility]  - Abx:               - zosyn 6/27- present               - vancomycin 6/27- present  - Cultures               - Bcx 6/26 ([facility]): in process               - sputum culture  - Anuric, so holding off Legionella/strep urine testing      Hematology:    # Chronic macrocytic anemia   Per review, baseline Hgb around 8-9- likely in setting of anemia of renal disease. On admission, Hgb lower at 7.6. No s/s bleeding.   - Daily Hgb checks  - Transfuse for Hgb <7  - EPO per nephrology  - Checking B12, folate given elevated MCV      # Chronic thrombocytopenia   Likely in setting of liver dysfunction.   - CTM      Musculoskeletal:  # Weakness/deconditioning   - PT/OT     Skin:  No acute concerns      General Cares/Prophylaxis:    DVT Prophylaxis: heparin gtt   GI Prophylaxis: PPI  Restraints: None  Family Communication: Wife Ofe; called, no answer, will try again in AM  Code Status: FULL     Lines/tubes/drains:  - HD tunneled line places 6/23  - PIV x2 RUE 6/26  - CVC right jugular     Disposition:  - Medical ICU     Patient seen and findings/plan discussed with medical ICU staff, Dr. Kilpatrick.    Katia Garza    Clinically Significant Risk Factors          # Hypochloremia: Lowest Cl = 96 mmol/L in last 2 days, will monitor as appropriate         # Coagulation Defect: INR = 1.37 (Ref range: 0.85 - 1.15) and/or PTT = N/A, will monitor for bleeding    # Hypertension: Noted on problem list            # Overweight: Estimated body mass index is 28.88 kg/m  as calculated from the following:     Height as of 6/24/25: 1.829 m (6').    Weight as of this encounter: 96.6 kg (212 lb 15.4 oz)., PRESENT ON ADMISSION       # Financial/Environmental Concerns: none                  ====================================  INTERVAL HISTORY:   Patient sitting up in bed in no acute distress, speaking with family at bedside.    OBJECTIVE:   1. VITAL SIGNS:   Temp:  [97.1  F (36.2  C)-98.1  F (36.7  C)] 97.9  F (36.6  C)  Pulse:  [57-83] 62  Resp:  [12-32] 12  BP: ()/(29-78) 95/64  SpO2:  [89 %-100 %] 98 %  Resp: 12  2. INTAKE/ OUTPUT:   I/O last 3 completed shifts:  In: 1845.67 [P.O.:530; I.V.:760.67; IV Piggyback:555]  Out: -     3. PHYSICAL EXAMINATION:  General: Patient is chronically ill-appearing in no acute distress.  HEENT: Normocephalic, atraumatic  Neuro: Awake, A&Ox3, mildly somnolent, asterixis present, 5/5 strength in BLE and BUE  Pulm/Resp: On 2L NC, sitting up in bed. Crackles appreciated in the bilateral lower lung fields without wheezing or rhonchi. No accessory muscle usage. Patient frequently coughing with exam.   CV: RRR, capillary refill <2 seconds  Abdomen: Mild non-focal tenderness, positive fluid wave, tympanitic abdomen  : Patient anuric  Incisions/Skin: Bruising to the LUE (site of fistula recannulation), edges marked with pen. Skin otherwise dry    4. LABS:   Arterial Blood Gases   No lab results found in last 7 days.  Complete Blood Count   Recent Labs   Lab 06/29/25  0457 06/28/25  1306 06/27/25  0344 06/27/25  0248   WBC 6.4 7.4 9.2 7.5   HGB 7.8* 7.4* 7.4* 6.9*    130* 110* 97*     Basic Metabolic Panel  Recent Labs   Lab 06/29/25  0457 06/29/25  0101 06/28/25  1934 06/28/25  0540 06/27/25  2112 06/27/25  0248 06/27/25  0140 06/26/25  1302     --   --  137  --  130*  --  132*   POTASSIUM 3.9  --   --  3.8  --  4.0  --  4.2   CHLORIDE 98  --   --  96*  --  92*  --  91*   CO2 26  --   --  28  --  20*  --  26   BUN 27.4*  --   --  16.7  --  29.9*  --  25.4*   CR 5.04*  --   --   3.70*  --  6.15*  --  5.64*   * 121* 116* 120*   < > 124*   < > 192*    < > = values in this interval not displayed.     Liver Function Tests  Recent Labs   Lab 06/29/25 0457 06/28/25  0540 06/27/25  0248 06/26/25  1302   AST 40 56* 113* 147*   ALT 15 23 37 54   ALKPHOS 138 154* 179* 208*   BILITOTAL 0.7 0.7 0.8 1.0   ALBUMIN 3.7 3.7 4.0 4.0   INR 1.37* 1.57* 1.47* 1.47*     Coagulation Profile  Recent Labs   Lab 06/29/25 0457 06/28/25  0540 06/27/25  0248 06/26/25  1302   INR 1.37* 1.57* 1.47* 1.47*       5. RADIOLOGY:   No results found for this or any previous visit (from the past 24 hours).

## 2025-06-29 NOTE — PROGRESS NOTES
Brief IS Note  Tx Hep Staff    6/28 trough 9hr  = 10.3 (supratx)  Goal 5-7  On PTA dose    Continue PTA FK dose and please obtain accurate trough:  12 hours after PM dose, BEFORE AM dose

## 2025-06-29 NOTE — PLAN OF CARE
Goal Outcome Evaluation:      Plan of Care Reviewed With: patient, sibling    Overall Patient Progress: improvingOverall Patient Progress: improving    Outcome Evaluation: care management to follow for discharge planning; will need KERRY orders with OP dialysis upon discharge. Follow for therapy recs, with OT currently recommending d/c home with assist.

## 2025-06-29 NOTE — PLAN OF CARE
Goal Outcome Evaluation:      Plan of Care Reviewed With: patient, spouse, sibling    Overall Patient Progress: improvingOverall Patient Progress: improving    No neuro changes, intermittently disoriented to time and situation. SR with HRs 60-70s. Systolic BP goal >90. Levo off since 1500. On scheduled midodrine. Sats mid 90s on 1L NC. Desats to mid 80s during exertion/ambulation. Attempted to wean oxygen at rest, also de-satted to 88%-90%. Continues to endorse (L) arm pain from fistula thrombectomy, currently managed with PRN oxycodone. Two BMs today. Anuric. Heparin gtt currently infusing at 1700 units/hr and therapeutic. Next 10a level tomorrow AM. Up with SBA, ambulated X3 in hallways today.     Plan: continue to monitor BPs, HD run tomorrow.

## 2025-06-29 NOTE — CONSULTS
Care Management Initial Consult    General Information  Assessment completed with: Patient, Family, Dylan- Brother  Type of CM/SW Visit: Initial Assessment    Primary Care Provider verified and updated as needed: Yes   Readmission within the last 30 days: current reason for admission unrelated to previous admission   Return Category: New Diagnosis  Reason for Consult: other (see comments) (elevated risk readmission score)  Advance Care Planning: Advance Care Planning Reviewed: questions answered  provided blank HCD to patient/brother     Communication Assessment  Patient's communication style: spoken language (English or Bilingual)        Cognitive  Cognitive/Neuro/Behavioral: .WDL except  Level of Consciousness: confused  Arousal Level: opens eyes spontaneously  Orientation: disoriented to, time  Mood/Behavior: calm, cooperative  Best Language: 0 - No aphasia  Speech: spontaneous    Living Environment:   People in home: spouse     Current living Arrangements: house      Able to return to prior arrangements: yes  Living Arrangement Comments: recently moved- new home requires flight of stairs to access bedroom, kitchen, bathroom, living area    Family/Social Support:  Care provided by: self, spouse/significant other  Provides care for: no one  Marital Status:   Support system: Wife, Sibling(s)  Ofe       Description of Support System: Supportive, Involved    Support Assessment: Adequate family and caregiver support    Current Resources:   Patient receiving home care services: No  Community Resources: Dialysis Services, OP Dialysis  Equipment currently used at home: raised toilet seat  Supplies currently used at home: None    Employment/Financial:  Employment Status: disabled        Financial Concerns: none     Does the patient's insurance plan have a 3 day qualifying hospital stay waiver?  No    Lifestyle & Psychosocial Needs:  Social Drivers of Health     Food Insecurity: No Food Insecurity (4/18/2025)     Received from [facility]    Hunger Vital Sign     Worried About Running Out of Food in the Last Year: Never true     Ran Out of Food in the Last Year: Never true   Depression: Not at risk (4/18/2025)    Received from [facility]    PHQ-2     PHQ-2 Score: 0   Housing Stability: Low Risk  (4/18/2025)    Received from [facility]    Housing Stability     What is your living situation today?: I have a steady place to live   Tobacco Use: Low Risk  (6/24/2025)    Patient History     Smoking Tobacco Use: Never     Smokeless Tobacco Use: Never     Passive Exposure: Never   Financial Resource Strain: Low Risk  (1/16/2024)    Financial Resource Strain     Within the past 12 months, have you or your family members you live with been unable to get utilities (heat, electricity) when it was really needed?: No   Alcohol Use: Unknown (10/7/2019)    AUDIT-C     Frequency of Alcohol Consumption: Monthly or less     Average Number of Drinks: Not on file     Frequency of Binge Drinking: Not on file   Transportation Needs: No Transportation Needs (4/18/2025)    Received from [facility]    PRAPARE - Transportation     Lack of Transportation (Medical): No     Lack of Transportation (Non-Medical): No   Physical Activity: Insufficiently Active (4/18/2025)    Received from [facility]    Exercise Vital Sign     Days of Exercise per Week: 2 days     Minutes of Exercise per Session: 10 min   Interpersonal Safety: Low Risk  (11/19/2024)    Interpersonal Safety     Do you feel physically and emotionally safe where you currently live?: Yes     Within the past 12 months, have you been hit, slapped, kicked or otherwise physically hurt by someone?: No     Within the past 12 months, have you been humiliated or emotionally abused in other ways by your partner or ex-partner?: No   Stress: Not on file   Social Connections: Not on file   Health Literacy: Not on file       Functional Status:  Prior to admission patient needed assistance:   Dependent  "ADLs:: Independent  Dependent IADLs:: Independent       Mental Health Status:  Mental Health Status: No Current Concerns       Chemical Dependency Status:  Chemical Dependency Status: No Current Concerns             Values/Beliefs:  Spiritual, Cultural Beliefs, Restorationism Practices, Values that affect care: yes  Description of Beliefs that Will Affect Care: Synagogue    Cultural/Restorationism Practices Patient Routinely Participates In: other (see comments) (Communion)  Values/Beliefs Comment: appreciates  support/communion    Discussed  Partnership in Safe Discharge Planning  document with patient/family: No    Additional Information:  CM/SW auto-consult due to elevated risk readmission score. For completion of this initial assessment, writer conducted chart review and a bedside interview with pt and his brother, Dylan.     Per chart review: \"Blanco Osborne is a 59 yo male with Pmhx ESRD (on HD MWF since 1/23), NAFLD s/p liver transplant (9/2016), PEA arrest 2/2 hypoxic respiratory failure (2022), pAF, CVA, HLD, GERD, CHRISTIAN on CPAP, RLS, and hx seizures. Presented to [facility] ED for hypoxia and AMS. Appeared to be volume overloaded on ED exam, subsequently admitted to [facility] 6/26 for HD. However after HD pt was persistently hypotensive despite midodrine, therefore started on pressors and transferred to [facility] given bed availability. Treating for shock 2/2 presumed infection, also obtaining CT PE.\"    Writer introduced self and role to Blanco's brother, Dylan, at bedside. Dylan agreed to interview; Blanco was asleep at the start of interview, but did wake and participated in interview. Blanco and Dylan confirmed new address, PCP, and insurance.     Blanco lives in a single-family home in [place]; he and his spouse, Ofe, recently moved into their new home. There is a flight of stairs required to enter the main living space, where the kitchen, bedroom, and bathroom are also located. Blanco shared he usually does OK " "with stairs. Blanco and Ofe still own their previous townhome, which does not have as many stairs, and Dylan mentioned it as an option if the stairs would be a barrier to discharging home.     Blanco is not currently working; he receives SSDI and his spouse is retired. 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 At baseline, Blanco is independent with ADLs and IADLs. He receives OP Dialysis via [facility]. He currently does not have home care services, but Dylan mentioned that he previously had skilled nursing, PT, OT, and home health aide services after a previous hospitalization. Dylan mentioned it was helpful for Blanco to have help in the shower when he received a new dialysis line, which might be helpful upon discharge from this hospitalization. Writer provided education on care management and IDT's role in discharge planning.     Blanco verified that he does not have a health care directive, but shared \"It would probably be good to do one.\" Writer provided education around Honoring Choices and HCD process within the hospital. Blanco expressed interest, and writer returned post-interview with copies of blank HCD and informational handout.     Discharge Planning Contacts:   [facility] (M/W/F)  Phone: [phone]  Fax: [phone]    Next Steps:   -Care management to follow for this admission; current recommendation from OT is home with assist. [facility].     JAMISON Villanueva, LICSW  Weekend Covering   [facility] Acute Care Management  Searchable on Vocera: 4C MICU SW, 4E SICU SW, 4A CVICU SW        "

## 2025-06-29 NOTE — PLAN OF CARE
ICU End of Shift Summary. See flowsheets for vital signs and detailed assessment.    Changes this shift: No acute changes this shift. Levo gtt titrated between 0.02 and 0.05 to maintain SBP>90. 1L NC.    Plan: Wean levo and supplemental O2 as pt tolerates, continue POC.    Problem: Adult Inpatient Plan of Care  Goal: Absence of Hospital-Acquired Illness or Injury  Intervention: Identify and Manage Fall Risk  Recent Flowsheet Documentation  Taken 6/29/2025 0400 by Betina Martinez RN  Safety Promotion/Fall Prevention:   activity supervised   assistive device/personal items within reach   clutter free environment maintained   increased rounding and observation   increase visualization of patient   lighting adjusted   nonskid shoes/slippers when out of bed   room door open   room near nurse's station   room organization consistent   safety round/check completed   supervised activity  Taken 6/29/2025 0000 by Betina Martinez RN  Safety Promotion/Fall Prevention:   activity supervised   assistive device/personal items within reach   clutter free environment maintained   increased rounding and observation   increase visualization of patient   lighting adjusted   nonskid shoes/slippers when out of bed   room door open   room near nurse's station   room organization consistent   safety round/check completed   supervised activity  Taken 6/28/2025 2000 by Betina Martinez RN  Safety Promotion/Fall Prevention:   activity supervised   assistive device/personal items within reach   clutter free environment maintained   increased rounding and observation   increase visualization of patient   lighting adjusted   nonskid shoes/slippers when out of bed   room door open   room near nurse's station   room organization consistent   safety round/check completed   supervised activity  Intervention: Prevent Skin Injury  Recent Flowsheet Documentation  Taken 6/29/2025 0400 by Betina Martinez RN  Body Position: position changed  independently  Taken 6/29/2025 0200 by Betina Martinez RN  Body Position: position changed independently  Taken 6/29/2025 0000 by Betina Martinez RN  Body Position: position changed independently  Taken 6/28/2025 2200 by Betina Martinez RN  Body Position: position changed independently  Taken 6/28/2025 2000 by Betina Martinez RN  Body Position: position changed independently  Goal: Optimal Comfort and Wellbeing  Intervention: Monitor Pain and Promote Comfort  Recent Flowsheet Documentation  Taken 6/28/2025 2000 by Betina Martinez RN  Pain Management Interventions:   medication (see MAR)   rest  Intervention: Provide Person-Centered Care  Recent Flowsheet Documentation  Taken 6/29/2025 0400 by Betina Martinez RN  Trust Relationship/Rapport:   care explained   choices provided   questions answered   reassurance provided  Taken 6/29/2025 0000 by Betina Martinez RN  Trust Relationship/Rapport:   care explained   choices provided   questions answered   reassurance provided  Taken 6/28/2025 2000 by Betina Martinez RN  Trust Relationship/Rapport:   care explained   choices provided   questions answered   reassurance provided   Goal Outcome Evaluation:

## 2025-06-29 NOTE — PROGRESS NOTES
Nephrology Progress Note  06/29/2025         Assessment & Recommendations:   Blanco Osborne is a 61 yo male with Pmhx ESRD (on HD MWF since 1/23), NAFLD s/p liver transplant (9/2016), PEA arrest 2/2 hypoxic respiratory failure (2022), pAF, CVA, HLD, GERD, CHRISTIAN on CPAP, RLS, and hx seizures. Presented to [facility] ED for hypoxia and AMS. Appeared to be volume overloaded on ED exam, subsequently admitted to [facility] 6/26 for HD. However after HD pt was persistently hypotensive despite midodrine, therefore started on pressors and transferred to [facility] given bed availability. Treating for shock 2/2 presumed infection, also found to have PE.     #ESKD: dialyzes MWF at [facility] with Dr. Bradshaw. Access: tunneled RIJ (clotted L AVF s/p revision with Dr Mcneil 6/24/25, not to be used for 1 month). Run time: 3.5 hrs. TW 89.5 kg. Active on tx list here and is also being evaluated at [place]  - Pt had HD 6/25, UF only run 6/26 an HD on 6/27 with no UF  - no urgent need for hemodialysis today, will plan on next HD to occur on Monday per his outine schedule.  Ideally, hemodynamics will have improved at that point but will plan on HD regardless of whether he remains on low dose NE     #BP/Volume: anuric, TW 89.5 kg but has been coming off HD at 90.5 kg since 6/13  - hx of intradialytic hypotension   - per [facility], BP's remain in 100's generally throughout HD  - UF 2L on 6/25, 1.5L 6/26, limited by hypotension  - now intermittently on low dose NE  - evidence of volume overload on CXR and echo  - O2 100% on 4L  - echo 6/27 with EF 60-65%, flattened septum c/w R ventricular pressure and volume overload (also in setting of new PE), IVC diameter and resp changes in intermediate range; no pericardial effusion  -~3 L paracentesis on 6/27.  Will consider gentle UF with next HD planned for 6/30.    -now on midodrine 20 mg TID     #Anemia of CKD: on mircera 30 mcg j0litga  - hgb 7's.  Has had some blood loss related to  "dialysis access-related isues  - ordered epogen 4000 units per HD  - transfusion per primary team     #Acid/base: HCO3 28. No current issues.     #Electrolytes: K 43.8, Na 137     #BMD: Ca 8's, alb 43.7, phos 3.6  -would continue to hold sevelamer for now     #ID: Initially on vanc/zosyn now only on zosyn for possible pneumonia.      #New PE: on heparin  - CT with \"PE in posterior segmental and subsegmental arterial branches in the left upper lobe. No evidence of right heart strain.\"    Esteban Ascencio MD   Division of Nephrology and Hypertension  [employer]  Vocera Web Console    Interval History :   Nursing and provider notes from last 24 hours reviewed.  In the last 24 hours Blanco Osborne remains on intermittent low dose norepinephrine.  Midodrine increased to 20 mg TID. He is more alert today He continues on heparin for PE. Vanco now discontinued and remains on zosyn currently planned for a 7 day course. ~3 L removed with paracentesis yesteday.  He denies dyspnea, chest pain and abdominal pain.      Review of Systems:   A comprehensive ROS was obtained and unremarkable other than that mentioned in the interval history.      Physical Exam:   I/O last 3 completed shifts:  In: 1845.67 [P.O.:530; I.V.:760.67; IV Piggyback:555]  Out: -    /58   Pulse 62   Temp 97.9  F (36.6  C) (Oral)   Resp 22   Wt 96.6 kg (212 lb 15.4 oz)   SpO2 100%   BMI 28.88 kg/m       GENERAL APPEARANCE: NAD  EYES: no scleral icterus, pupils equal  Lymphatics: no cervical or supraclavicular LAD  Pulmonary: diminished  CV: RRR   - Edema trace LE, slightly distended abdomen  GI: soft, nontender  MS: no evidence of inflammation in joints, no muscle tenderness  : no gomez  SKIN: no concerning rash  NEURO: face symmetric, a/o3  Access: tunneled RIJ (newly revised L AVF)    Labs:   All labs reviewed by me  Electrolytes/Renal -   Recent Labs   Lab Test 06/29/25  0457 06/29/25  0101 06/28/25  1934 06/28/25  0540 06/27/25 2112 " 06/27/25  0248 06/23/25  0621 06/22/25 2004     --   --  137  --  130*   < > 128*   POTASSIUM 3.9  --   --  3.8  --  4.0   < > 4.8   CHLORIDE 98  --   --  96*  --  92*   < > 91*   CO2 26  --   --  28  --  20*   < > 21*   BUN 27.4*  --   --  16.7  --  29.9*   < > 60.4*   CR 5.04*  --   --  3.70*  --  6.15*   < > 10.37*   * 121* 116* 120*   < > 124*   < > 106*   KELLY 8.9  --   --  8.8  --  8.9   < > 8.6*   MAG 1.9  --   --  1.8  --   --   --  2.2   PHOS 3.6  --   --  2.0*  --  4.8*  --  5.5*    < > = values in this interval not displayed.       CBC -   Recent Labs   Lab Test 06/29/25  0457 06/28/25  1306 06/27/25  0344   WBC 6.4 7.4 9.2   HGB 7.8* 7.4* 7.4*    130* 110*       LFTs -   Recent Labs   Lab Test 06/29/25  0457 06/28/25  0540 06/27/25  0248   ALKPHOS 138 154* 179*   BILITOTAL 0.7 0.7 0.8   ALT 15 23 37   AST 40 56* 113*   PROTTOTAL 6.5 6.5 7.1   ALBUMIN 3.7 3.7 4.0       Iron Panel -   Recent Labs   Lab Test 10/24/23  0537 04/14/23  0725 04/06/23  0808 03/25/23  1058 03/12/23  0756   IRON 38* 60* 78  --  37*   IRONSAT 20 29  --   --  26   HOSSEIN 423* 286  --    < > 252    < > = values in this interval not displayed.         Imaging:  All imaging reviewed by me.     Current Medications:  Current Facility-Administered Medications   Medication Dose Route Frequency Provider Last Rate Last Admin    aspirin EC tablet 81 mg  81 mg Oral Daily Rashida Olivia MD   81 mg at 06/29/25 0807    lacosamide (VIMPAT) tablet 50 mg  50 mg Oral Daily Rashida Olivia MD   50 mg at 06/29/25 0807    midodrine (PROAMATINE) tablet 20 mg  20 mg Oral Q8H Katia Garza   20 mg at 06/29/25 0807    pantoprazole (PROTONIX) EC tablet 40 mg  40 mg Oral BID Rashida Olivia MD   40 mg at 06/29/25 0807    piperacillin-tazobactam (ZOSYN) 2.25 g vial to attach to  ml bag  2.25 g Intravenous Q6H Rainer Jones MD   2.25 g at 06/29/25 1112    rOPINIRole (REQUIP) tablet 0.5 mg  0.5 mg Oral BID Rashida Olivia  MD   0.5 mg at 06/29/25 0808    tacrolimus (GENERIC EQUIVALENT) capsule 1 mg  1 mg Oral Q12H Rashida Olivia MD   1 mg at 06/29/25 0807     Current Facility-Administered Medications   Medication Dose Route Frequency Provider Last Rate Last Admin    heparin 25,000 units in 0.45% NaCl 250 mL ANTICOAGULANT infusion  0-5,000 Units/hr Intravenous Continuous Katia Garza 17 mL/hr at 06/29/25 1000 1,700 Units/hr at 06/29/25 1000    norepinephrine (LEVOPHED) 4 mg in  mL PERIPHERAL infusion  0.01-0.2 mcg/kg/min Intravenous Continuous Laine Rosas MD 3.5 mL/hr at 06/29/25 1113 0.01 mcg/kg/min at 06/29/25 1113     Esteban Ascencio MD

## 2025-06-30 LAB
ABO + RH BLD: NORMAL
ALBUMIN SERPL BCG-MCNC: 3.4 G/DL (ref 3.5–5.2)
ALP SERPL-CCNC: 110 U/L (ref 40–150)
ALT SERPL W P-5'-P-CCNC: 11 U/L (ref 0–70)
ANION GAP SERPL CALCULATED.3IONS-SCNC: 15 MMOL/L (ref 7–15)
AST SERPL W P-5'-P-CCNC: 28 U/L (ref 0–45)
BASOPHILS # BLD AUTO: 0 10E3/UL (ref 0–0.2)
BASOPHILS NFR BLD AUTO: 1 %
BILIRUB SERPL-MCNC: 0.6 MG/DL
BLD GP AB SCN SERPL QL: NEGATIVE
BLD PROD TYP BPU: NORMAL
BLOOD COMPONENT TYPE: NORMAL
BUN SERPL-MCNC: 38.5 MG/DL (ref 8–23)
CALCIUM SERPL-MCNC: 8.9 MG/DL (ref 8.8–10.4)
CHLORIDE SERPL-SCNC: 97 MMOL/L (ref 98–107)
CODING SYSTEM: NORMAL
CREAT SERPL-MCNC: 6.33 MG/DL (ref 0.67–1.17)
CROSSMATCH: NORMAL
EGFRCR SERPLBLD CKD-EPI 2021: 9 ML/MIN/1.73M2
EOSINOPHIL # BLD AUTO: 0.1 10E3/UL (ref 0–0.7)
EOSINOPHIL NFR BLD AUTO: 2 %
ERYTHROCYTE [DISTWIDTH] IN BLOOD BY AUTOMATED COUNT: 17.1 % (ref 10–15)
ERYTHROCYTE [DISTWIDTH] IN BLOOD BY AUTOMATED COUNT: 17.1 % (ref 10–15)
ERYTHROCYTE [DISTWIDTH] IN BLOOD BY AUTOMATED COUNT: 17.5 % (ref 10–15)
GLUCOSE SERPL-MCNC: 95 MG/DL (ref 70–99)
HCO3 SERPL-SCNC: 24 MMOL/L (ref 22–29)
HCT VFR BLD AUTO: 21.9 % (ref 40–53)
HCT VFR BLD AUTO: 22.3 % (ref 40–53)
HCT VFR BLD AUTO: 23.4 % (ref 40–53)
HGB BLD-MCNC: 6.5 G/DL (ref 13.3–17.7)
HGB BLD-MCNC: 6.6 G/DL (ref 13.3–17.7)
HGB BLD-MCNC: 7 G/DL (ref 13.3–17.7)
IMM GRANULOCYTES # BLD: 0 10E3/UL
IMM GRANULOCYTES NFR BLD: 1 %
INR PPP: 1.34 (ref 0.85–1.15)
ISSUE DATE AND TIME: NORMAL
LABORATORY REPORT: NORMAL
LYMPHOCYTES # BLD AUTO: 0.9 10E3/UL (ref 0.8–5.3)
LYMPHOCYTES NFR BLD AUTO: 27 %
MAGNESIUM SERPL-MCNC: 1.8 MG/DL (ref 1.7–2.3)
MCH RBC QN AUTO: 34.3 PG (ref 26.5–33)
MCH RBC QN AUTO: 34.4 PG (ref 26.5–33)
MCH RBC QN AUTO: 34.6 PG (ref 26.5–33)
MCHC RBC AUTO-ENTMCNC: 29.6 G/DL (ref 31.5–36.5)
MCHC RBC AUTO-ENTMCNC: 29.7 G/DL (ref 31.5–36.5)
MCHC RBC AUTO-ENTMCNC: 29.9 G/DL (ref 31.5–36.5)
MCV RBC AUTO: 115 FL (ref 78–100)
MCV RBC AUTO: 116 FL (ref 78–100)
MCV RBC AUTO: 117 FL (ref 78–100)
MONOCYTES # BLD AUTO: 0.6 10E3/UL (ref 0–1.3)
MONOCYTES NFR BLD AUTO: 16 %
NEUTROPHILS # BLD AUTO: 1.8 10E3/UL (ref 1.6–8.3)
NEUTROPHILS NFR BLD AUTO: 53 %
NRBC # BLD AUTO: 0 10E3/UL
NRBC BLD AUTO-RTO: 1 /100
PETH INTERPRETATION: NORMAL
PF4 HEPARIN CMPLX AB SER QL: NEGATIVE
PHOSPHATE SERPL-MCNC: 4.8 MG/DL (ref 2.5–4.5)
PLAT MORPH BLD: ABNORMAL
PLATELET # BLD AUTO: 68 10E3/UL (ref 150–450)
PLATELET # BLD AUTO: 74 10E3/UL (ref 150–450)
PLATELET # BLD AUTO: 85 10E3/UL (ref 150–450)
PLPETH BLD-MCNC: 64 NG/ML
POLYCHROMASIA BLD QL SMEAR: SLIGHT
POPETH BLD-MCNC: 121 NG/ML
POTASSIUM SERPL-SCNC: 3.9 MMOL/L (ref 3.4–5.3)
PROT SERPL-MCNC: 6.2 G/DL (ref 6.4–8.3)
PROTHROMBIN TIME: 16.4 SECONDS (ref 11.8–14.8)
RBC # BLD AUTO: 1.88 10E6/UL (ref 4.4–5.9)
RBC # BLD AUTO: 1.92 10E6/UL (ref 4.4–5.9)
RBC # BLD AUTO: 2.04 10E6/UL (ref 4.4–5.9)
RBC MORPH BLD: ABNORMAL
SODIUM SERPL-SCNC: 136 MMOL/L (ref 135–145)
SPECIMEN EXP DATE BLD: NORMAL
TACROLIMUS BLD-MCNC: 10.2 UG/L (ref 5–15)
TME LAST DOSE: NORMAL H
TME LAST DOSE: NORMAL H
UFH PPP CHRO-ACNC: 0.64 IU/ML (ref ?–1.1)
UNIT ABO/RH: NORMAL
UNIT NUMBER: NORMAL
UNIT STATUS: NORMAL
UNIT TYPE ISBT: 5100
VARIANT LYMPHS BLD QL SMEAR: PRESENT
WBC # BLD AUTO: 2.9 10E3/UL (ref 4–11)
WBC # BLD AUTO: 3.4 10E3/UL (ref 4–11)
WBC # BLD AUTO: 4.2 10E3/UL (ref 4–11)

## 2025-06-30 PROCEDURE — 250N000013 HC RX MED GY IP 250 OP 250 PS 637

## 2025-06-30 PROCEDURE — 85014 HEMATOCRIT: CPT

## 2025-06-30 PROCEDURE — 99233 SBSQ HOSP IP/OBS HIGH 50: CPT | Mod: GC | Performed by: STUDENT IN AN ORGANIZED HEALTH CARE EDUCATION/TRAINING PROGRAM

## 2025-06-30 PROCEDURE — P9016 RBC LEUKOCYTES REDUCED: HCPCS

## 2025-06-30 PROCEDURE — 83735 ASSAY OF MAGNESIUM: CPT

## 2025-06-30 PROCEDURE — 84100 ASSAY OF PHOSPHORUS: CPT

## 2025-06-30 PROCEDURE — 86923 COMPATIBILITY TEST ELECTRIC: CPT

## 2025-06-30 PROCEDURE — 120N000011 HC R&B TRANSPLANT UMMC

## 2025-06-30 PROCEDURE — 99233 SBSQ HOSP IP/OBS HIGH 50: CPT | Mod: GC | Performed by: INTERNAL MEDICINE

## 2025-06-30 PROCEDURE — 80197 ASSAY OF TACROLIMUS: CPT | Performed by: NURSE PRACTITIONER

## 2025-06-30 PROCEDURE — 258N000003 HC RX IP 258 OP 636: Performed by: INTERNAL MEDICINE

## 2025-06-30 PROCEDURE — 99233 SBSQ HOSP IP/OBS HIGH 50: CPT | Performed by: PHYSICIAN ASSISTANT

## 2025-06-30 PROCEDURE — 86022 PLATELET ANTIBODIES: CPT

## 2025-06-30 PROCEDURE — 85520 HEPARIN ASSAY: CPT | Performed by: INTERNAL MEDICINE

## 2025-06-30 PROCEDURE — 90937 HEMODIALYSIS REPEATED EVAL: CPT

## 2025-06-30 PROCEDURE — 99233 SBSQ HOSP IP/OBS HIGH 50: CPT | Performed by: NURSE PRACTITIONER

## 2025-06-30 PROCEDURE — 250N000012 HC RX MED GY IP 250 OP 636 PS 637

## 2025-06-30 PROCEDURE — 250N000011 HC RX IP 250 OP 636: Performed by: INTERNAL MEDICINE

## 2025-06-30 PROCEDURE — 250N000013 HC RX MED GY IP 250 OP 250 PS 637: Performed by: PHYSICIAN ASSISTANT

## 2025-06-30 PROCEDURE — 85027 COMPLETE CBC AUTOMATED: CPT

## 2025-06-30 PROCEDURE — 86900 BLOOD TYPING SEROLOGIC ABO: CPT

## 2025-06-30 PROCEDURE — 85025 COMPLETE CBC W/AUTO DIFF WBC: CPT

## 2025-06-30 PROCEDURE — 250N000011 HC RX IP 250 OP 636

## 2025-06-30 PROCEDURE — 80069 RENAL FUNCTION PANEL: CPT

## 2025-06-30 PROCEDURE — 36415 COLL VENOUS BLD VENIPUNCTURE: CPT

## 2025-06-30 PROCEDURE — 85610 PROTHROMBIN TIME: CPT

## 2025-06-30 PROCEDURE — 634N000001 HC RX 634: Mod: JZ | Performed by: INTERNAL MEDICINE

## 2025-06-30 RX ORDER — DROXIDOPA 100 MG/1
100 CAPSULE ORAL
Status: COMPLETED | OUTPATIENT
Start: 2025-06-30 | End: 2025-07-01

## 2025-06-30 RX ORDER — CEFTRIAXONE 2 G/1
2 INJECTION, POWDER, FOR SOLUTION INTRAMUSCULAR; INTRAVENOUS EVERY 24 HOURS
Status: COMPLETED | OUTPATIENT
Start: 2025-06-30 | End: 2025-07-03

## 2025-06-30 RX ORDER — DROXIDOPA 100 MG/1
100 CAPSULE ORAL 3 TIMES DAILY
Status: DISPENSED | OUTPATIENT
Start: 2025-07-01

## 2025-06-30 RX ORDER — MIDODRINE HYDROCHLORIDE 5 MG/1
10 TABLET ORAL DAILY PRN
Status: DISPENSED | OUTPATIENT
Start: 2025-06-30

## 2025-06-30 RX ADMIN — MIDODRINE HYDROCHLORIDE 20 MG: 5 TABLET ORAL at 07:52

## 2025-06-30 RX ADMIN — ROPINIROLE HYDROCHLORIDE 0.5 MG: 0.5 TABLET, FILM COATED ORAL at 19:51

## 2025-06-30 RX ADMIN — ACETAMINOPHEN 650 MG: 325 TABLET ORAL at 21:43

## 2025-06-30 RX ADMIN — OXYCODONE HYDROCHLORIDE 5 MG: 5 TABLET ORAL at 06:39

## 2025-06-30 RX ADMIN — SODIUM CHLORIDE 250 ML: 0.9 INJECTION, SOLUTION INTRAVENOUS at 10:53

## 2025-06-30 RX ADMIN — EPOETIN ALFA-EPBX 4000 UNITS: 10000 INJECTION, SOLUTION INTRAVENOUS; SUBCUTANEOUS at 11:42

## 2025-06-30 RX ADMIN — MIDODRINE HYDROCHLORIDE 20 MG: 5 TABLET ORAL at 21:59

## 2025-06-30 RX ADMIN — TACROLIMUS 1 MG: 1 CAPSULE ORAL at 07:51

## 2025-06-30 RX ADMIN — ACETAMINOPHEN 650 MG: 325 TABLET ORAL at 02:18

## 2025-06-30 RX ADMIN — ROPINIROLE HYDROCHLORIDE 0.5 MG: 0.5 TABLET, FILM COATED ORAL at 07:51

## 2025-06-30 RX ADMIN — ACETAMINOPHEN 650 MG: 325 TABLET ORAL at 16:07

## 2025-06-30 RX ADMIN — ONDANSETRON 4 MG: 4 TABLET, ORALLY DISINTEGRATING ORAL at 21:43

## 2025-06-30 RX ADMIN — PIPERACILLIN AND TAZOBACTAM 2.25 G: 2; .25 INJECTION, POWDER, FOR SOLUTION INTRAVENOUS at 04:43

## 2025-06-30 RX ADMIN — SODIUM CHLORIDE 200 ML: 0.9 INJECTION, SOLUTION INTRAVENOUS at 10:53

## 2025-06-30 RX ADMIN — PANTOPRAZOLE SODIUM 40 MG: 40 TABLET, DELAYED RELEASE ORAL at 19:51

## 2025-06-30 RX ADMIN — Medication: at 10:53

## 2025-06-30 RX ADMIN — MIDODRINE HYDROCHLORIDE 10 MG: 5 TABLET ORAL at 12:36

## 2025-06-30 RX ADMIN — HEPARIN SODIUM 1700 UNITS/HR: 10000 INJECTION, SOLUTION INTRAVENOUS at 19:55

## 2025-06-30 RX ADMIN — ASPIRIN 81 MG CHEWABLE TABLET 81 MG: 81 TABLET CHEWABLE at 07:52

## 2025-06-30 RX ADMIN — CEFTRIAXONE SODIUM 2 G: 2 INJECTION, POWDER, FOR SOLUTION INTRAMUSCULAR; INTRAVENOUS at 10:10

## 2025-06-30 RX ADMIN — OXYCODONE HYDROCHLORIDE 5 MG: 5 TABLET ORAL at 21:59

## 2025-06-30 RX ADMIN — HEPARIN SODIUM 1700 UNITS/HR: 10000 INJECTION, SOLUTION INTRAVENOUS at 06:41

## 2025-06-30 RX ADMIN — OXYCODONE HYDROCHLORIDE 5 MG: 5 TABLET ORAL at 13:36

## 2025-06-30 RX ADMIN — Medication 5 MG: at 21:43

## 2025-06-30 RX ADMIN — GABAPENTIN 300 MG: 300 CAPSULE ORAL at 19:51

## 2025-06-30 RX ADMIN — LACOSAMIDE 50 MG: 50 TABLET, FILM COATED ORAL at 07:52

## 2025-06-30 RX ADMIN — MIDODRINE HYDROCHLORIDE 20 MG: 5 TABLET ORAL at 00:07

## 2025-06-30 RX ADMIN — PANTOPRAZOLE SODIUM 40 MG: 40 TABLET, DELAYED RELEASE ORAL at 07:51

## 2025-06-30 ASSESSMENT — ACTIVITIES OF DAILY LIVING (ADL)
ADLS_ACUITY_SCORE: 42

## 2025-06-30 NOTE — PROGRESS NOTES
"Admitted/transferred from: 4C  Time of arrival on unit around 4 pm   2 RN full  skin assessment completed Anel ROE   Skin assessment finding: issues found Left upper arm fistula bruised, marked - tender and warm. Tunneled CVC right upper chest, old blood, pressure dressing on. Old healed clamshell incision.  RLQ puncture from paracentesis covered with a dressing CDI  Interventions/actions: skin interventions CVC dressing changed.      End of Shift Note:     /75 (BP Location: Left leg)   Pulse 64   Temp 97.4  F (36.3  C) (Oral)   Resp 15   Wt 95.3 kg (210 lb 1.6 oz)   SpO2 97%   BMI 28.49 kg/m          VS: Stable with 1 liter of oxygen via nasal canula.  Pain: 8/10 pain. Left arm, head, and abdomen. Gave PRN Tylenol once with good results.  Neuro: Intermittent confusion, anxious, but cooperative. Needs a lot of reassurance/comfort from nurses. Patient seemed very anxious about 's (baseline 90's-100's).  Cardiac: WNL, denies chest pain  Respiratory:  Oxygenation low when talking or eating mid 80's. One liter of oxygen. Pulse ox cont.    Diet/Appetite:  Renal diet with fair appetite.  /GI: anuric, one small BM.   LDA's: 3 PIV\"S. One infusing heparin at 1700 units. 2 PIV's SL.  Skin: see skin note above   Activity: SBA, ambulated outside room once   Procedures: HD this morning, 2 liters removed. Pt didn't get his schedule Midodrine at 4 pm due to parameter not met - /55 -   Pertinent Labs/: HBG 7.0   Bed Alarm on at times       "

## 2025-06-30 NOTE — PROGRESS NOTES
[facility]    Progress Note - Medicine Service, MANUEL TEAM 4       Date of Admission:  6/27/2025    Assessment & Plan   Blanco Osborne is a 60 year old male admitted on 6/27/2025. 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 He was admitted to ICU for pressors requirements (septic vs. Hypovolemic shock) found to have PE now on heparin gtt.     Updates:   - Transfer to floor   - Nephrology following to consider renal transplant evaluation   - Continue heparin gtt; HIIT pending   > Transition to warfarin tomorrow if Hgb stable  - Repeat Hgb this afternoon after HD (holding off on transfusion for now); patient consented   - BP goal per nephrology - systolic's > 90 rather than MAP goal and >80 systolic's during HD   - May need repeat para in 1-2 days given abdominal distension    # Clot in dialysis fistula s/p thrombectomy and fistula aneurysm repair 6/24/25 with HD tunneled line in place  # ESRD on HD  # Hypotension with HD  Pt has required hemodialysis since 11/2022 due to an acute kidney injury during a hospitalization for respiratory failure and sepsis. Per review, HD is difficult for him to handle given hypotension- requires midodrine with HD.   Currently being evaluated at [place] for kidney transplant (though key question, given poor hepatic function, is whether he should proceed with kidney transplant alone). Based on his picture and this ICU stay, it appears his transplant will need to occur more urgently as he has high likelihood of readmission to the ICU. Dry weight list as 89.5kg.   - Nephrology consulted   - Midodrine as below    # AHRF  # Pulmonary Embolism  #Edema with pleural effusions   #Pneumonia   Likely multifactorial in setting of  presumed pneumonia, edema and new pulmonary embolism.  He was started on a heparin gtt.  His oxygen requirements have been weaning down. Was non-compliant with warfarin as an outpatient given plan for possible kidney transplant will hold off on DOAC switch.    - Continues on heparin drip   > HIIT lab ordered (low suspicion thus kept on drip)   > Holding off on DOAC given possible kidney transplant and warfarin preferred   -Ceftriaxone 6/30- Plan for 7 total days   - Follow cultures NGTD     #Pancytopenia   # Chronic macrocytic anemia   # Chronic thrombocytopenia   Chronically anemic/thrombocytopenic. No s/s bleeding. On 6/30 developed pancytopenia. Unclear etiology at this point. Medication induced, versus dilutional with volume. Some concern for HIT with acute drop in platelets but 4T score on the lower end. Will send screen. Zosyn could also cause acute drop in platelets.   - hit screen stat; if positive would hold heparin and switch to DOAC/argatroban/ warfarin would be a little harder to bridge  - monitor counts  - patient wanting to hold off on transfusion due to transplant concern; will need to discuss transplant status (I think it is through [place]). Reasonable to see if nephrology could pull some volume off as he is volume up. The zosyn was also stopped and switched to ceftriaxone today.     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 He has had ascites now several years requiring intermittent paracenteses. Per report, pt has a number of features consistent with clinically significant portal hypertension. He had liver biopsy in our system 4/09/2025 showed severe hepatitis with many plasma cells, activity grade 3-4 /4;  "likely mild fibrosis. Hepatology suspects that NRH is contributing factor to his underlying portal hypertension- this along with his ESRD and difficulty pulling fluid during dialysis all contributing to his ascites.  - Hepatology consulted, appreciate recs  - Continue PTA tacrolimus: 1mg BID               - tacro level in AM  - SBP management: on ceftriaxone   - Has hx SBP: last culture data visible is in 2022, lactobacillus, notably not on prophylactic cipro -- likely will need in future  - ethyl alcohol level (while no history of active use, some notes point out alcohol use post transplant. AST 3x the level of ALT as well, plus evidence of fibrosis and hepatitis on liver biopsy) -- PETH with moderate use     # Tricuspid Insufficiency  # Hx of pericardial effusion 2023, s/p pericardiocentesis x 2 in 9/23 + 10/23   # CAD  New on echocardiogram 6/27/25, as compared to previous study 1/11/24. Appears functional, RV with normal size and normal function (borderline reduced). Moderate to severe tricuspid regurge possible 2/2 volume overload.  - Pending kidney transplant workup may need cardiology consult    - PTA ASA 81     #Hx CVA  - PTA ASA 81mg       #RLS  - PTA ropinirole 0.5mg BID     #Hx seizures  - Continue lacosamide 50mg daily (of note, Jacobo note states that pt takes every other day as he is being \"weaned off\" by his neurologist\". Per 10/24 note review, neurology advised pt to stay on this medication).   - Lacosamide level at goal      #CHRISTIAN  Unclear if uses CPAP at home.     #GERD  - PTA PPI    --- Resolved Medical Problems ---   #Hypotension   #Hx chronic hypotension  Pt has ongoing concerns with chronic hypotension: typically takes midodrine on dialysis days and sometime he takes extra doses as needed for symptoms. His average systolic BP is 80s-90s and he can feel asymptomatic with BP in the 80s.  He has been difficult to dialyze due to this chronic hypotension.  After discussion with family it appears " he had not been taking his midodrine totally as prescribed.  He was weaned off nor epi and midodrine was increased.  Suspect majority of his presentation is chronic in nature.  Low concern for shock picture   - Continue midodrine 20 mg every 8 hours            Diet: Renal Diet (dialysis)    DVT Prophylaxis: Heparin gtt  Nixon Catheter: Not present  Fluids: None  Lines: PRESENT      CVC Double Lumen Right Internal jugular Tunneled-Site Assessment: WDL except;Bleeding      Cardiac Monitoring: ACTIVE order. Indication: ICU  Code Status: Full Code      Clinically Significant Risk Factors          # Hypochloremia: Lowest Cl = 97 mmol/L in last 2 days, will monitor as appropriate      # Hypoalbuminemia: Lowest albumin = 3.4 g/dL at 6/30/2025  6:34 AM, will monitor as appropriate  # Coagulation Defect: INR = 1.34 (Ref range: 0.85 - 1.15) and/or PTT = N/A, will monitor for bleeding  # Thrombocytopenia: Lowest platelets = 68 in last 2 days, will monitor for bleeding   # Hypertension: Noted on problem list            # Overweight: Estimated body mass index is 28.76 kg/m  as calculated from the following:    Height as of 6/24/25: 1.829 m (6').    Weight as of this encounter: 96.2 kg (212 lb 1.3 oz)., PRESENT ON ADMISSION     # Financial/Environmental Concerns: none         Social Drivers of Health   Alcohol Use: Unknown (10/7/2019)    AUDIT-C     Frequency of Alcohol Consumption: Monthly or less   Physical Activity: Insufficiently Active (4/18/2025)    Received from [facility]    Exercise Vital Sign     Days of Exercise per Week: 2 days     Minutes of Exercise per Session: 10 min         Disposition Plan     Medically Ready for Discharge: Anticipated in 2-4 Days         The patient's care was discussed with the Attending Physician, Dr. Chowdhury.    Mary Beth Duvall MD  Medicine Service, [facility] TEAM [address] [place] [address]  Securely message with [employer] (more info)  Text page via [employer]  Paging/Directory   See signed in provider for up to date coverage information  ______________________________________________________________________    Interval History   No acute events overnight. Mild abdominal pain. Feeling better and breathing improved.     Physical Exam   Vital Signs: Temp: (!) 96.5  F (35.8  C) Temp src: Axillary BP: 97/44 Pulse: 61   Resp: 18 SpO2: 100 % O2 Device: Nasal cannula Oxygen Delivery: 1 LPM  Weight: 212 lbs 1.32 oz    General: awake, alert, no distress, undergoing CRRT  Neuro: Awake, A&Ox3,  Pulm/Resp: On 1L NC, lying in bed, Breathing comfortably on 1L NC   CV: RRR  Abdomen: soft, distended, mild tenderness to palpation   MSK Bruising to the LUE (site of fistula recannulation), edges marked with pen. Ecchymosis present   No edema extremities     Medical Decision Making       Please see A&P for additional details of medical decision making.      Data     I have personally reviewed the following data over the past 24 hrs:    3.4 (L)  \   6.6 (LL)   / 74 (L)     136 97 (L) 38.5 (H) /  95   3.9 24 6.33 (H) \     ALT: 11 AST: 28 AP: 110 TBILI: 0.6   ALB: 3.4 (L) TOT PROTEIN: 6.2 (L) LIPASE: N/A     INR:  1.34 (H) PTT:  N/A   D-dimer:  N/A Fibrinogen:  N/A       Imaging results reviewed over the past 24 hrs:   No results found for this or any previous visit (from the past 24 hours).

## 2025-06-30 NOTE — PLAN OF CARE
ICU End of Shift Summary. See flowsheets for vital signs and detailed assessment.    Changes this shift: No acute changes this shift. Maintained SBP > 90 without the use of pressors throughout the night.    Plan: HD, transfer out of ICU if remains off pressors and tolerates HD run.      Problem: Adult Inpatient Plan of Care  Goal: Absence of Hospital-Acquired Illness or Injury  Intervention: Identify and Manage Fall Risk  Recent Flowsheet Documentation  Taken 6/30/2025 0400 by Betina Martinez RN  Safety Promotion/Fall Prevention:   activity supervised   assistive device/personal items within reach   clutter free environment maintained   increased rounding and observation   increase visualization of patient   lighting adjusted   nonskid shoes/slippers when out of bed   room door open   room near nurse's station   room organization consistent   safety round/check completed   supervised activity  Taken 6/30/2025 0000 by Betina Martinez RN  Safety Promotion/Fall Prevention:   activity supervised   assistive device/personal items within reach   clutter free environment maintained   increased rounding and observation   increase visualization of patient   lighting adjusted   nonskid shoes/slippers when out of bed   room door open   room near nurse's station   room organization consistent   safety round/check completed   supervised activity  Taken 6/29/2025 2000 by Betina Martinez RN  Safety Promotion/Fall Prevention:   activity supervised   assistive device/personal items within reach   clutter free environment maintained   increased rounding and observation   increase visualization of patient   lighting adjusted   nonskid shoes/slippers when out of bed   room door open   room near nurse's station   room organization consistent   safety round/check completed   supervised activity  Intervention: Prevent Skin Injury  Recent Flowsheet Documentation  Taken 6/30/2025 0400 by Betina Martinez RN  Body Position: position  changed independently  Taken 6/30/2025 0200 by Betina Martinez RN  Body Position: position changed independently  Taken 6/30/2025 0000 by Betina Martinez RN  Body Position: position changed independently  Taken 6/29/2025 2200 by Betina Martinez RN  Body Position: position changed independently  Taken 6/29/2025 2000 by Betina Martinez RN  Body Position: position changed independently  Intervention: Prevent and Manage VTE (Venous Thromboembolism) Risk  Recent Flowsheet Documentation  Taken 6/30/2025 0400 by Betina Martinez RN  VTE Prevention/Management: (heparin gtt) other (see comments)  Taken 6/30/2025 0000 by Betina Martinez RN  VTE Prevention/Management: (heparin gtt) other (see comments)  Taken 6/29/2025 2000 by Betina Martinez RN  VTE Prevention/Management: (heparin gtt) other (see comments)  Goal: Optimal Comfort and Wellbeing  Intervention: Monitor Pain and Promote Comfort  Recent Flowsheet Documentation  Taken 6/30/2025 0218 by Betina Martinez RN  Pain Management Interventions: medication (see MAR)  Taken 6/30/2025 0000 by Betina Martinez RN  Pain Management Interventions:   relaxation techniques promoted   rest   medication (see MAR)  Intervention: Provide Person-Centered Care  Recent Flowsheet Documentation  Taken 6/30/2025 0400 by Betina Martinez RN  Trust Relationship/Rapport:   care explained   choices provided   emotional support provided   empathic listening provided   questions answered   questions encouraged   reassurance provided   thoughts/feelings acknowledged  Taken 6/30/2025 0000 by Betina Martinez RN  Trust Relationship/Rapport:   care explained   choices provided   emotional support provided   empathic listening provided   questions answered   questions encouraged   reassurance provided   thoughts/feelings acknowledged  Taken 6/29/2025 2000 by Betina Martinez RN  Trust Relationship/Rapport:   care explained   choices provided   emotional support provided   empathic  listening provided   questions answered   questions encouraged   reassurance provided   thoughts/feelings acknowledged   Goal Outcome Evaluation:

## 2025-06-30 NOTE — PROGRESS NOTES
HEMODIALYSIS TREATMENT NOTE    Date: 6/30/2025  Time: 12:10 PM    Data:  Modality: bed, standing  Pre Wt: 96.2 kg (bed)  Desired Wt:  94.2 kg   Post Wt: 95.3 kg (standing)  Ultrafiltration - Post Run Net Total Removed (mL): 2000 mL  Vascular Access Site: patent   Dialyzer Rinse: Streaked, Light  Total Blood Volume Processed: 82.3 L Liters  Total Dialysis (Treatment) Time: 3.5 Hours  Dialysate Bath: K 3, Ca 2.25  Heparin: Heparin: None    Lab:   No    Interventions:  Dialysis done through: Right catheter  UF set to .5 Liters of fluid removal, increased UF goal to 2 L as tolerated per Teressa HAAS, Crit line Profile A  BFR: Blood Flow Rate (BFR): 400  DFR: Potassium/Calcium Rate: 600 mL/min  Epoetin, oxycodone and PRN midodrine administered per MAR  CVC dressing changed aseptically x2, stat seal and quick clot applied with pressure dressing, and sand bag applied, primary and provider aware  Catheter lumens locked with NS, cath guards changed post HD  CritLine stable throughout the run, tolerating UF pull, see flowsheets for additional information.    Report given to Eileen RN, left pt in stable condition in rm [id number]    Assessment:  Alert, lethargic/O x 4, calm & cooperative, denies nausea, dizizness  Complaint of L arm pain, oxycodone admin per MAR  Complaint SOB, on 1 LPM nasal cannula, sating 100 %  Access site intact, CVC WDL except bleeding   Trace edema in bilateral lower extremities     Plan:    Per Renal team

## 2025-06-30 NOTE — PLAN OF CARE
Goal Outcome Evaluation:      Plan of Care Reviewed With: patient, spouse    Overall Patient Progress: improving  Outcome Evaluation: HD today    ICU End of Shift Summary. See flowsheets for vital signs and detailed assessment.    Changes this shift: Alert, oriented, and able to make needs known yet occasionally has some confusion. Complains of pain in left arm, receives oxycodone PRN. Softer BP but stable. Received additional dose of midodrine during HD. 2L off during HD. On and off 1L NC. HD tunneled line bleeding. Dressing changed by dialysis nurse multiple times with stat seal in place. HIT negative. Hgb critical at 6.6, patient denied receiving blood at this time. MICU aware. Trending CBCs. Med surg orders placed and patient transferred to .     Plan: Trend labs. HD per schedule. Monitor for bleeding.

## 2025-06-30 NOTE — PROGRESS NOTES
Nephrology Progress Note  06/30/2025         Assessment & Recommendations:   Blanco Osborne is a 61 yo male with Pmhx ESRD (on HD MWF since 1/23), NAFLD s/p liver transplant (9/2016), PEA arrest 2/2 hypoxic respiratory failure (2022), pAF, CVA, HLD, GERD, CHRISTIAN on CPAP, RLS, and hx seizures. Presented to [facility] ED for hypoxia and AMS. Appeared to be volume overloaded on ED exam, subsequently admitted to [facility] 6/26 for HD. However after HD pt was persistently hypotensive despite midodrine, therefore started on pressors and transferred to [facility] given bed availability. Treating for shock 2/2 presumed infection, also found to have PE.     #ESKD: dialyzes MWF at [facility] with Dr. Bradshaw. Access: tunneled RIJ (clotted L AVF s/p revision with Dr Mcneil 6/24/25, not to be used for 1 month). Run time: 3.5 hrs. TW 89.5 kg. Active on tx list here and is also being evaluated at [place]  - Pt had HD 6/25, UF only run 6/26 an HD on 6/27 with no UF  - dialysis today (Monday) per MWF schedule     #BP/Volume: anuric, TW 89.5 kg but has been coming off HD at 90.5 kg since 6/13  - hx of intradialytic hypotension   - UF 2L on 6/25, 1.5L 6/26, limited by hypotension   -off pressors  - evidence of volume overload on CXR and echo  - O2 100% on RA  - echo 6/27 with EF 60-65%, flattened septum c/w R ventricular pressure and volume overload (also in setting of new PE), IVC diameter and resp changes in intermediate range; no pericardial effusion  - s/p 3 L paracentesis on 6/27    -now on midodrine 20 mg TID  - ordered prn midodrine 10 mg for use during HD   - ok for SBP > 85 if asymptomatic on dialysis  - increased UF goal 2L given BP's 100's and flat crit line  - will obtain post HD standing weight     #Pancytopenia:  #Anemia of CKD: on mircera 30 mcg [id number]  - hgb 6-7's.  Has had some blood loss related to leaking around CVC  - ordered epogen 4000 units per HD  - transfusion per primary team     #Acid/base: No  "current issues.     #Electrolytes: K 3.9, Na 136     #BMD: Ca 8's, alb 3.4, phos 4.8  - if phos remains elevated, can restart PTA sevelamer     #ID: Initially on vanc/zosyn now only on ceftriaxone for possible HAP     #Afib: has been on DOAC vs warfarin on and off  #New PE: on heparin  - CT with \"PE in posterior segmental and subsegmental arterial branches in the left upper lobe. No evidence of right heart strain.\"    JEANCARLOS Fowler   Division of Nephrology and Hypertension  [employer] Web Console    Recommendations communicated to primary team via [employer], in person and note    Interval History :   Seen on dialysis, initially goal 0.5-1L however increased to 2L given 's and flat crit line. Pt reports dialyzing in 80's at times if asymptomatic. He is now on midodrine 20 mg tid and ordered another dose for during HD prn. He currently denies n/v, CP, SOB, chills    Review of Systems:   A comprehensive ROS was obtained and unremarkable other than that mentioned in the interval history.      Physical Exam:   I/O last 3 completed shifts:  In: 2716.27 [P.O.:1720; I.V.:796.27; IV Piggyback:200]  Out: -    BP 90/54 (BP Location: Right leg, Cuff Size: Adult Regular)   Pulse 59   Temp (!) 96.5  F (35.8  C) (Axillary)   Resp 16   Wt 96.2 kg (212 lb 1.3 oz)   SpO2 100%   BMI 28.76 kg/m       GENERAL APPEARANCE: NAD  EYES: no scleral icterus, pupils equal  Pulmonary: diminished  CV: RRR   - Edema trace LE, slightly distended abdomen  GI: soft, nontender  MS: no evidence of inflammation in joints, no muscle tenderness  : no gomez  SKIN: no concerning rash  NEURO: face symmetric, a/o3  Access: tunneled RIJ (newly revised L AVF)    Labs:   All labs reviewed by me  Electrolytes/Renal -   Recent Labs   Lab Test 06/30/25  0634 06/29/25  0457 06/29/25  0101 06/28/25  1934 06/28/25  0540    137  --   --  137   POTASSIUM 3.9 3.9  --   --  3.8   CHLORIDE 97* 98  --   --  96*   CO2 24 26  --   --  28   BUN 38.5* 27.4*  " --   --  16.7   CR 6.33* 5.04*  --   --  3.70*   GLC 95 130* 121*   < > 120*   KELLY 8.9 8.9  --   --  8.8   MAG 1.8 1.9  --   --  1.8   PHOS 4.8* 3.6  --   --  2.0*    < > = values in this interval not displayed.       CBC -   Recent Labs   Lab Test 06/30/25  0910 06/30/25  0634 06/29/25  0457   WBC 3.4* 2.9* 6.4   HGB 6.6* 6.5* 7.8*   PLT 74* 68* 152       LFTs -   Recent Labs   Lab Test 06/30/25  0634 06/29/25  0457 06/28/25  0540   ALKPHOS 110 138 154*   BILITOTAL 0.6 0.7 0.7   ALT 11 15 23   AST 28 40 56*   PROTTOTAL 6.2* 6.5 6.5   ALBUMIN 3.4* 3.7 3.7       Iron Panel -   Recent Labs   Lab Test 10/24/23  0537 04/14/23  0725 04/06/23  0808 03/25/23  1058 03/12/23  0756   IRON 38* 60* 78  --  37*   IRONSAT 20 29  --   --  26   HOSSEIN 423* 286  --    < > 252    < > = values in this interval not displayed.         Imaging:  All imaging reviewed by me.     Current Medications:  Current Facility-Administered Medications   Medication Dose Route Frequency Provider Last Rate Last Admin    aspirin EC tablet 81 mg  81 mg Oral Daily Rashida Olivia MD   81 mg at 06/30/25 0752    cefTRIAXone (ROCEPHIN) 2 g vial to attach to  ml bag for ADULTS or NS 50 ml bag for PEDS  2 g Intravenous Q24H Katia Garza   2 g at 06/30/25 1010    epoetin mirta-epbx (RETACRIT) injection 4,000 Units  4,000 Units Intravenous Once in dialysis/CRRT Esteban Ascencio MD        lacosamide (VIMPAT) tablet 50 mg  50 mg Oral Daily Rashida Olivia MD   50 mg at 06/30/25 0752    midodrine (PROAMATINE) tablet 20 mg  20 mg Oral Q8H Katia Garza   20 mg at 06/30/25 0752    pantoprazole (PROTONIX) EC tablet 40 mg  40 mg Oral BID Rashida Olivia MD   40 mg at 06/30/25 0751    rOPINIRole (REQUIP) tablet 0.5 mg  0.5 mg Oral BID Rashida Olivia MD   0.5 mg at 06/30/25 0751    sodium chloride (PF) 0.9% PF flush 9 mL  9 mL Intracatheter During Dialysis/CRRT (from stock) Esteban Ascencio MD        sodium chloride (PF) 0.9% PF flush 9 mL  9 mL  Intracatheter During Dialysis/CRRT (from stock) Esteban Ascencio MD        sodium chloride 0.9% BOLUS 500 mL  500 mL Hemodialysis Machine Once Esteban Ascencio MD        tacrolimus (GENERIC EQUIVALENT) capsule 1 mg  1 mg Oral Q12H Rashida Olivia MD   1 mg at 06/30/25 0751     Current Facility-Administered Medications   Medication Dose Route Frequency Provider Last Rate Last Admin    heparin 25,000 units in 0.45% NaCl 250 mL ANTICOAGULANT infusion  0-5,000 Units/hr Intravenous Continuous Katia Garza 17 mL/hr at 06/30/25 1000 1,700 Units/hr at 06/30/25 1000     JEANCARLOS Fowler

## 2025-06-30 NOTE — PROGRESS NOTES
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 He was admitted to ICU for pressors requirements. He was found to have acute PE, and his midodrine has been up titrated.     CHANGES and MAJOR THINGS TODAY:   -stop zosyn  -HIT screen; continue heparin until returns; lower suspicion for HIT   -Start ceftriaxone  -Dialysis today  -continue midodrine; can consider prn dose   -Discuss with nephrology expressing concern for not tolerating dialysis and worsening volume status along with anticoagulation     PLAN:    Neuro:  #AMS  #Lethargy   Patient A&Ox3 but tired. Suspect AMS/lethargy in setting of toxic metabolic encephalopathy 2/2 metabolic derangements associated with ESRD . Considered contribution of infection in setting of new abdominal pain and hypotension in patient with ascites and history of SBP/pneumonia. SBP studies negative but already on antibiotics, treating for presumed pneumonia.      #Hx seizures  - Continue lacosamide 50mg daily (of note, [facility] note states that pt takes every other day as he is being \"weaned off\" by his neurologist\". Per 10/24 note review, neurology advised pt to stay on this medication).   - Lacosamide level at goal      #Hx CVA  - PTA ASA 81mg       #RLS  - PTA ropinirole 0.5mg BID     Pulmonary:  # AHRF  # Pulmonary Embolism  #Edema with pleural effusions   #Pneumonia   Suspect presentation is likely multifactorial in setting of presumed pneumonia, edema and new pulmonary embolism.  He was started on a heparin gtt.  His oxygen requirements have been weaning down.    Continue heparin drip--> I would opt to switch him to a " DOAC.  Pharmacy liaison consult placed.  There was some concern with him being on the transplant list as they switched him from DOAC to warfarin.  However he has been noncompliant with warfarin and has not followed up with him recently.  In the setting of this pulmonary embolism I think a DOAC would be a better choice.  Will need to touch base with nephrology/transplant team. Child Velásquez Class A.   - Continue antibiotics as in ID section  - Volume removal as able during dialysis    #CHRISTIAN  Unclear if uses CPAP at home.      Cardiovascular:  #Hypotension   #Hx chronic hypotension  Pt has ongoing concerns with chronic hypotension: typically takes midodrine on dialysis days and sometime he takes extra doses as needed for symptoms. His average systolic BP is 80s-90s and he can feel asymptomatic with BP in the 80s.  He has been difficult to dialyze due to this chronic hypotension.  After discussion with family it appears he had not been taking his midodrine totally as prescribed.  He was weaned off nor epi and midodrine was increased.  Suspect majority of his presentation is chronic in nature.  Low concern for shock picture    Continue midodrine 20 mg every 8 hours    # Tricuspid Insufficiency  New on echocardiogram 6/27/25, as compared to previous study 1/11/24. Appears functional, RV with normal size and normal function (borderline reduced).    Consider follow-up with cardiology     # Hx of pericardial effusion 2023, s/p pericardiocentesis x 2 in 9/23 + 10/23   Echocardiogram in March 2023 showed normal LVEF, no significant valve disease, no obvious vegetations, but with note of a small posterior pericardial effusion without tamponade. Pt was followed with many serial echocardiograms since the initial effusion was discovered. With this, the effusion continued to develop, and eventually he returned to [facility] for pericardiocentesis on 9/29/23 & another one in Oct 2023. Felt to be due to volume overload due to renal failure      # CAD  Of note, underwent dobutamine stress test 4/25 which was negative for myocardial ischemia. The left ventricular ejection fraction increased from 65% at rest to 75% at peak stress. His RV was mildly dysfunctional with the RVSP was just 26 mmHg and CVP was 5. There was no significant valvulopathy.   - PTA ASA 81      # Hx paroxysmal Afib  Currently in the work up for a watchman. 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 He has had ascites now several years requiring intermittent paracenteses.   Per report, pt has a number of features consistent with clinically significant portal hypertension. Repeat pressure measurements showed HVPG of 12-13 mmHg. Review of imaging shows evidence of abdominal varices, splenomegaly, and a nodular-appearing liver with ascites. Total protein in ascitic fluid has varied to greater than or less than 2.5 with SAAG >1.1. Prior colonoscopy did show rectal varices, per report. He has not had a recent upper endoscopy. Portal and hepatic veins are patent. Notably, he had a liver biopsy in our system 4/09/2025 showed severe hepatitis with many plasma cells, activity grade 3-4 /4; likely mild fibrosis. Hepatology suspects that NRH is contributing factor to his underlying portal hypertension- this along with his ESRD and difficulty pulling fluid during dialysis all contributing to his ascites.  - Hepatology consulted, appreciate recs  - Continue PTA tacrolimus: 1mg BID               - tacro level in  AM  - SBP management: on ceftriaxone                - Has hx SBP: last culture data visible is in 2022, lactobacillus, notably not on prophylactic cipro -- likely will need in future  - ethyl alcohol level (while no history of active use, some notes point out alcohol use post transplant. AST 3x the level of ALT as well, plus evidence of fibrosis and hepatitis on liver biopsy) -- PETH with moderate use      # GERD  - PTA PPI     # Hernias  He has a left large inguinal hernia, a right small inguinal hernia, an umbilical hernia, and a superior ventral hernia. No surgical interventions planned given high risk of recurrence and post-op complications if attempted to repair.      Renal/Fluids/Electrolytes:    # ESRD on HD  # Hypotension with HD  Pt has required hemodialysis since 11/2022 due to an acute kidney injury during a hospitalization for respiratory failure and sepsis. Per review, HD is difficult for him to handle given hypotension- requires midodrine with HD.   Currently being evaluated at [place] for kidney transplant (though key question, given poor hepatic function, is whether he should proceed with kidney transplant alone). 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 He then underwent fistulogram with IR 6/23 with large thrombus noted and ultimately inability to keep fistula open. Subsequently tunnel cath placed via IR 6/23 & he underwent thrombectomy with repair of fistula aneurysms with Dr Mcneil 6/24. Discharged with plan for scheduled HD on 6/27.   - Tunneled HD line in place      Endocrine:  No acute concerns.      ID:  # Pneumonia , hospital acquired versus community  Lower suspicion for pseudomonas and having likely bone marrow suppression from zosyn. Will deescalate today.     - Abx:   -Ceftriaxone 6/30- Plan for 7 total days                - zosyn 6/27- 6/30               - vancomycin 6/27- 6/28  - Cultures               - Bcx 6/26 (shannonle): no growth to date               - sputum culture     Hematology:    #Pancytopenia   # Chronic macrocytic anemia   # Chronic thrombocytopenia   Chronically anemic/thrombocytopenic. No s/s bleeding. On 6/30 developed pancytopenia. Unclear etiology at this point. Medication induced, versus dilutional with volume. Some concern for HIT with acute drop in platelets but 4T score on the lower end. Will send screen. Zosyn could also cause acute drop in platelets.     -hit screen stat; if positive would hold heparin and switch to DOAC/argatroban/ warfarin would be a little harder to bridge  - monitor counts  -patient wanting to hold off on transfusion due to transplant concern; will need to discuss transplant status (I think it is through [place]). Reasonable to see if nephrology could pull some volume off as he is volume up. The zosyn was also stopped and switched to ceftriaxone today.      Musculoskeletal:  # Weakness/deconditioning   - PT/OT     Skin:  No acute concerns      General Cares/Prophylaxis:    DVT Prophylaxis: heparin gtt   GI Prophylaxis: PPI  Restraints: None  Family Communication: Wife Ofe  Code Status: FULL     Lines/tubes/drains:  - HD tunneled line places 6/23  - PIV x2 RUE 6/26     Disposition:  - Medical ICU     Patient seen and findings/plan discussed with medical ICU staff, Dr. Karen PATINO MD  MICU resident     Clinically Significant Risk Factors          # Hypochloremia: Lowest Cl = 97 mmol/L in last 2 days, will monitor as appropriate      # Hypoalbuminemia: Lowest albumin = 3.4 g/dL at 6/30/2025  6:34 AM, will monitor  as appropriate    # Coagulation Defect: INR = 1.34 (Ref range: 0.85 - 1.15) and/or PTT = N/A, will monitor for bleeding  # Thrombocytopenia: Lowest platelets = 68 in last 2 days, will monitor for bleeding   # Hypertension: Noted on problem list            # Overweight: Estimated body mass index is 28.76 kg/m  as calculated from the following:    Height as of 6/24/25: 1.829 m (6').    Weight as of this encounter: 96.2 kg (212 lb 1.3 oz)., PRESENT ON ADMISSION       # Financial/Environmental Concerns: none              ====================================  INTERVAL HISTORY:   Patient sitting up in bed in no acute distress, some pain at fistula site. Ecchymosis is improving    OBJECTIVE:   1. VITAL SIGNS:   Temp:  [97.4  F (36.3  C)-97.7  F (36.5  C)] 97.6  F (36.4  C)  Pulse:  [54-79] 62  Resp:  [11-25] 19  BP: ()/() 123/57  SpO2:  [90 %-100 %] 98 %  Resp: 19  2. INTAKE/ OUTPUT:   I/O last 3 completed shifts:  In: 2716.27 [P.O.:1720; I.V.:796.27; IV Piggyback:200]  Out: -     3. PHYSICAL EXAMINATION:  General: awake, alert, no distress   HEENT: Normocephalic, atraumatic  Neuro: Awake, A&Ox3,  Pulm/Resp: On 1L NC, sitting up in bed. Breathing comfortably   CV: RRR  Abdomen: soft, distended, nontender   MSK Bruising to the LUE (site of fistula recannulation), edges marked with pen. Ecchymosis improving-- wiggling fingers, compartment is soft.   No edema extremities     4. LABS:   Arterial Blood Gases   No lab results found in last 7 days.  Complete Blood Count   Recent Labs   Lab 06/30/25  0634 06/29/25  0457 06/28/25  1306 06/27/25  0344   WBC 2.9* 6.4 7.4 9.2   HGB 6.5* 7.8* 7.4* 7.4*   PLT 68* 152 130* 110*     Basic Metabolic Panel  Recent Labs   Lab 06/30/25  0634 06/29/25  0457 06/29/25  0101 06/28/25  1934 06/28/25  0540 06/27/25 2112 06/27/25  0248    137  --   --  137  --  130*   POTASSIUM 3.9 3.9  --   --  3.8  --  4.0   CHLORIDE 97* 98  --   --  96*  --  92*   CO2 24 26  --   --  28  --   20*   BUN 38.5* 27.4*  --   --  16.7  --  29.9*   CR 6.33* 5.04*  --   --  3.70*  --  6.15*   GLC 95 130* 121* 116* 120*   < > 124*    < > = values in this interval not displayed.     Liver Function Tests  Recent Labs   Lab 06/30/25  0634 06/29/25  0457 06/28/25  0540 06/27/25  0248   AST 28 40 56* 113*   ALT 11 15 23 37   ALKPHOS 110 138 154* 179*   BILITOTAL 0.6 0.7 0.7 0.8   ALBUMIN 3.4* 3.7 3.7 4.0   INR 1.34* 1.37* 1.57* 1.47*     Coagulation Profile  Recent Labs   Lab 06/30/25  0634 06/29/25 0457 06/28/25  0540 06/27/25  0248   INR 1.34* 1.37* 1.57* 1.47*       5. RADIOLOGY:   No results found for this or any previous visit (from the past 24 hours).

## 2025-06-30 NOTE — CONSULTS
Discharge Pharmacy Test Claim    Patient's [employer] Medicare Advantage plan covers Eliquis with an expected monthly copay of $47. If patient reaches $2000 out of pocket, copays reduce to $0.    Patient's plan requires a prior authorization for droxidopa. Please contact liaison if PA is needed.    Test Claim Copay   Eliquis 47.00   Droxidopa PA required     [facility] has one-time use 30-day free trial vouchers available for eliquis or xarelto.      Carine White  [facility] Liaison (A - L)  Phone: [phone] Fax: [phone]  Available on Teams & Vocera  Disclaimer: Pharmacy test claims are extimates and may not reflect final costs. Suggested alternatives aim to be cost-effective but may not be therapeutically equivalent as this consult is informational and does not constitute medical advice. Clinical decisions should be made by qualified healthcare providers.

## 2025-06-30 NOTE — PROGRESS NOTES
HEPATOLOGY PROGRESS NOTE  Patient:  Blanco Osborne, Date of birth 1965  Date of Visit:  06/30/2025  Referring Provider Meredith Waldrop         IMPRESSION:  Blanco Osborne is a 60 year old male with a history of DDLT 9/20/16 for metALD cirrhosis with a postoperative course complicated by PEA arrest (2022), CKD on HD and listed for kidney transplant, A-fib, recurrent cirrhosis, who was admitted after missing several days of HD and noted to have hypotension during last session with findings of PE on imaging and stress cardiomyopathy.      RECOMMENDATIONS:    Updates for 06/30/2025 :  -Holding tacrolimus    # DDLT 9/20/2016 for metALD  # Immunosuppression  # Hepatic fibrosis  # Ascites  # Congestive hepatopathy  - Recent liver biopsy at outside center with evidence of rejection and nodular regenerative hyperplasia.  He was noted to have concern for portal hypertension with HV PG of 12-13.  On admission he was noted to have a normal EF but did have moderate to severe tricuspid insufficiency and pulmonary hypertension.  His CT PE did not have concern for right heart strain.  Nodular regenerative hyperplasia can be seen with hepatic congestion.  At that time he was noted to have stage II-III out of IV fibrosis.  - Paracentesis on 6/27 without evidence of SBP. SAAG was elevated at 1.5 and total protein was also elevated at 4.3 suggesting congestive hepatopathy from his right sided dysfunction cause for ascites.  - He is currently being evaluated for SLK at [facility].  - Currently on tacrolimus 1 mg twice daily for immunosuppression.  Tacrolimus trough level 10.3 today. Will plan to hold tacrolimus for now. Goal trough level 4-6 given recent ACR on bx in May. Minimal elevation in AST, normal ALT and t. Bili.     Next follow up appointment: Dr. Simms 8/1/25    Thank you for the opportunity to be involved with  Blanco Osborne care. Please call with any questions or concerns.    Jacklyn Liu, APRN, CNP  Inpatient  Hepatology [facility]              Subjective    Reports feeling weak and having dyspnea with exertion.  Has been able to stand at the bedside without difficulty.  Denies chest pain, abdominal pain, melena, hematochezia or fevers.        Objective    VS: /55 (BP Location: Right leg, Cuff Size: Adult Regular)   Pulse 65   Temp 97.4  F (36.3  C) (Oral)   Resp 15   Wt 95.3 kg (210 lb 1.6 oz)   SpO2 93%   BMI 28.49 kg/m     Date 06/30/25 0700 - 07/01/25 0659   Shift 1157-8602 4375-0425 0430-6909 24 Hour Total   INTAKE   P.O. 240   240   I.V. 176   176   Other 0   0   IV Piggyback 100   100   Shift Total(mL/kg) 516(5.41)   516(5.41)   OUTPUT   Other 2000 2000   Shift Total(mL/kg) 2000(20.99)   2000(20.99)   Weight (kg) 95.3 95.3 95.3 95.3      General: In no acute distress, no facial muscle wasting  Neuro: AOx3, No asterixis  HEENT:  Noscleral icterus,   CV: Skin warm and dry  Lungs:  Respirations even and nonlabored on nasal cannula  Abd:   Nondistended, nontender  Extrem: Noperipheral edema   Skin: Nojaundice  Psych: pleasant    MEDICATIONS:  Current Facility-Administered Medications   Medication Dose Route Frequency Provider Last Rate Last Admin    acetaminophen (TYLENOL) tablet 650 mg  650 mg Oral Q8H PRN Rashida Olivia MD   650 mg at 06/30/25 1607    aspirin EC tablet 81 mg  81 mg Oral Daily Rashida Olivia MD   81 mg at 06/30/25 0752    calcium carbonate (TUMS) chewable tablet 500 mg  500 mg Oral 4x Daily PRN Rashida Olivia MD        cefTRIAXone (ROCEPHIN) 2 g vial to attach to  ml bag for ADULTS or NS 50 ml bag for PEDS  2 g Intravenous Q24H Katia Garza   2 g at 06/30/25 1010    glucose gel 15-30 g  15-30 g Oral Q15 Min PRN Rashida Olivia MD        Or    dextrose 50 % injection 25-50 mL  25-50 mL Intravenous Q15 Min PRN Rashida Olivia MD        Or    glucagon injection 1 mg  1 mg Subcutaneous Q15 Min PRN Rashida Olivia MD        gabapentin (NEURONTIN) capsule 300 mg  300 mg Oral At Bedtime  PRN Rashida Olivia MD   300 mg at 06/29/25 2044    heparin 25,000 units in 0.45% NaCl 250 mL ANTICOAGULANT infusion  0-5,000 Units/hr Intravenous Continuous Katia Garza 17 mL/hr at 06/30/25 1551 1,700 Units/hr at 06/30/25 1551    lacosamide (VIMPAT) tablet 50 mg  50 mg Oral Daily Rashida Olivia MD   50 mg at 06/30/25 0752    melatonin tablet 5 mg  5 mg Oral At Bedtime PRN Darline Tuttle APRN CNP   5 mg at 06/29/25 2044    midodrine (PROAMATINE) tablet 10 mg  10 mg Oral Daily PRN Teressa Olivas PA   10 mg at 06/30/25 1236    midodrine (PROAMATINE) tablet 20 mg  20 mg Oral Q8H Mary Beth Duvall MD   20 mg at 06/30/25 0752    naloxone (NARCAN) injection 0.2 mg  0.2 mg Intravenous Q2 Min PRN Rainer Jones MD        Or    naloxone (NARCAN) injection 0.4 mg  0.4 mg Intravenous Q2 Min PRN Rainer Jones MD        Or    naloxone (NARCAN) injection 0.2 mg  0.2 mg Intramuscular Q2 Min PRN Rainer Jones MD        Or    naloxone (NARCAN) injection 0.4 mg  0.4 mg Intramuscular Q2 Min PRN Rainer Jones MD        ondansetron (ZOFRAN ODT) ODT tab 4 mg  4 mg Oral Q6H PRN Rashida Olivia MD        Or    ondansetron (ZOFRAN) injection 4 mg  4 mg Intravenous Q6H PRN Rashida Olivia MD   4 mg at 06/29/25 0806    oxyCODONE (ROXICODONE) tablet 5 mg  5 mg Oral Q4H PRN Darline Tuttle APRN CNP   5 mg at 06/30/25 1336    pantoprazole (PROTONIX) EC tablet 40 mg  40 mg Oral BID Rashida Olivia MD   40 mg at 06/30/25 0751    polyethylene glycol (MIRALAX) Packet 17 g  17 g Oral Daily PRN Rashida Olivia MD        rOPINIRole (REQUIP) tablet 0.5 mg  0.5 mg Oral BID Rashida Olivia MD   0.5 mg at 06/30/25 0751    senna-docusate (SENOKOT-S/PERICOLACE) 8.6-50 MG per tablet 1 tablet  1 tablet Oral BID PRN Rashida Olivia MD        Or    senna-docusate (SENOKOT-S/PERICOLACE) 8.6-50 MG per tablet 2 tablet  2 tablet Oral BID PRN Rashida Olivia MD        tacrolimus (GENERIC EQUIVALENT)  capsule 1 mg  1 mg Oral Q12H Rashida Olivia MD   1 mg at 06/30/25 0751    thrombin 5000 units EPISTAXIS kit   Topical Once Mary Beth Duvall MD           REVIEW OF LABORATORY, PATHOLOGY AND IMAGING RESULTS:  BMP  Recent Labs   Lab 06/30/25  0634 06/29/25  0457 06/29/25  0101 06/28/25  1934 06/28/25  0540 06/27/25  2112 06/27/25  0248    137  --   --  137  --  130*   POTASSIUM 3.9 3.9  --   --  3.8  --  4.0   CHLORIDE 97* 98  --   --  96*  --  92*   KELLY 8.9 8.9  --   --  8.8  --  8.9   CO2 24 26  --   --  28  --  20*   BUN 38.5* 27.4*  --   --  16.7  --  29.9*   CR 6.33* 5.04*  --   --  3.70*  --  6.15*   GLC 95 130* 121* 116* 120*   < > 124*    < > = values in this interval not displayed.     CBC  Recent Labs   Lab 06/30/25  1642 06/30/25  0910 06/30/25  0634 06/29/25  0457   WBC 4.2 3.4* 2.9* 6.4   RBC 2.04* 1.92* 1.88* 2.25*   HGB 7.0* 6.6* 6.5* 7.8*   HCT 23.4* 22.3* 21.9* 25.8*   * 116* 117* 115*   MCH 34.3* 34.4* 34.6* 34.7*   MCHC 29.9* 29.6* 29.7* 30.2*   RDW 17.5* 17.1* 17.1* 17.0*   PLT 85* 74* 68* 152     INR  Recent Labs   Lab 06/30/25  0634 06/29/25  0457 06/28/25  0540 06/27/25  0248   INR 1.34* 1.37* 1.57* 1.47*     LFTs  Recent Labs   Lab 06/30/25  0634 06/29/25  0457 06/28/25  0540 06/27/25  0248   ALKPHOS 110 138 154* 179*   AST 28 40 56* 113*   ALT 11 15 23 37   BILITOTAL 0.6 0.7 0.7 0.8   PROTTOTAL 6.2* 6.5 6.5 7.1   ALBUMIN 3.4* 3.7 3.7 4.0        MELD 3.0: 22 at 6/30/2025  6:34 AM  MELD-Na: 24 at 6/30/2025  6:34 AM  Calculated from:  Serum Creatinine: On dialysis. Using the maximum value.  Serum Sodium: 136 mmol/L at 6/30/2025  6:34 AM  Total Bilirubin: 0.6 mg/dL (Using min of 1 mg/dL) at 6/30/2025  6:34 AM  Serum Albumin: 3.4 g/dL at 6/30/2025  6:34 AM  INR(ratio): 1.34 at 6/30/2025  6:34 AM  Age at listing (hypothetical): 60 years  Sex: Male at 6/30/2025  6:34 AM    Previous work-up:   Lab Results   Component Value Date    HEPBANG Nonreactive 06/22/2025    HBCAB Nonreactive  01/11/2024    AUSAB <3.50 06/22/2025    HCVAB Nonreactive 01/11/2024    HCVRNA Not Detected 12/16/2022    HOSSEIN 423 (H) 10/24/2023    IRONSAT 20 10/24/2023    TSH 1.74 06/27/2025    CHOL 136 03/18/2025    HDL 51 03/18/2025    LDL 63 03/18/2025    TRIG 109 03/18/2025    A1C 5.0 01/11/2024    POPETH 121 06/28/2025    PLPETH 64 06/28/2025      Lab Results   Component Value Date    SPECDES  01/11/2024     Blood: ACD      LDRESULTS  01/11/2024       Factor V 1691G>A ([place])  RESULTS:  Mutation analyzed: 1691G>A  Factor V 1691G>A ([place])  Interpretation:  ABSENT  Factor V 1691G>A ([place]) mutation  genotype:  G/G    FACTOR 2/PROTHROMBIN RESULTS:  Mutation analyzed: [id number]  Factor 2 Mutation Interpretation:  ABSENT  Factor 2 Mutation genotype:  G/G             Imaging Results:  CT chest pulm w 6/27/25  IMPRESSION:   1. Exam is positive for acute pulmonary embolism. Pulmonary emboli in  posterior segmental and subsegmental arterial branches in the left  upper lobe. No evidence of right heart strain.   2. Dense consolidation of the right lower lobe, likely atelectasis but  infection cannot be entirely excluded.  3. Scattered patchy consolidative opacities in the mid to lower right  lung, suggestive of an infectious and/or inflammatory process versus  aspiration.  4. Small right pleural effusion with additional pleural thickening in  the right hemithorax, predominantly in the superior component.  5. Right upper quadrant ascites.

## 2025-07-01 LAB
ALBUMIN SERPL BCG-MCNC: 3.6 G/DL (ref 3.5–5.2)
ALP SERPL-CCNC: 114 U/L (ref 40–150)
ALT SERPL W P-5'-P-CCNC: 10 U/L (ref 0–70)
ANION GAP SERPL CALCULATED.3IONS-SCNC: 13 MMOL/L (ref 7–15)
AST SERPL W P-5'-P-CCNC: 24 U/L (ref 0–45)
BACTERIA SPEC CULT: NO GROWTH
BACTERIA SPEC CULT: NO GROWTH
BILIRUB SERPL-MCNC: 0.5 MG/DL
BLD PROD TYP BPU: NORMAL
BLOOD COMPONENT TYPE: NORMAL
BUN SERPL-MCNC: 25.8 MG/DL (ref 8–23)
CALCIUM SERPL-MCNC: 8.7 MG/DL (ref 8.8–10.4)
CHLORIDE SERPL-SCNC: 96 MMOL/L (ref 98–107)
CODING SYSTEM: NORMAL
CREAT SERPL-MCNC: 3.97 MG/DL (ref 0.67–1.17)
CROSSMATCH: NORMAL
EGFRCR SERPLBLD CKD-EPI 2021: 16 ML/MIN/1.73M2
ERYTHROCYTE [DISTWIDTH] IN BLOOD BY AUTOMATED COUNT: 17.2 % (ref 10–15)
GLUCOSE SERPL-MCNC: 93 MG/DL (ref 70–99)
HCO3 SERPL-SCNC: 24 MMOL/L (ref 22–29)
HCT VFR BLD AUTO: 21.2 % (ref 40–53)
HGB BLD-MCNC: 6.3 G/DL (ref 13.3–17.7)
INR PPP: 1.28 (ref 0.85–1.15)
ISSUE DATE AND TIME: NORMAL
MAGNESIUM SERPL-MCNC: 1.7 MG/DL (ref 1.7–2.3)
MCH RBC QN AUTO: 33.7 PG (ref 26.5–33)
MCHC RBC AUTO-ENTMCNC: 29.7 G/DL (ref 31.5–36.5)
MCV RBC AUTO: 113 FL (ref 78–100)
PHOSPHATE SERPL-MCNC: 3.8 MG/DL (ref 2.5–4.5)
PLATELET # BLD AUTO: 72 10E3/UL (ref 150–450)
POTASSIUM SERPL-SCNC: 3.7 MMOL/L (ref 3.4–5.3)
PROT SERPL-MCNC: 6.2 G/DL (ref 6.4–8.3)
PROTHROMBIN TIME: 15.9 SECONDS (ref 11.8–14.8)
RBC # BLD AUTO: 1.87 10E6/UL (ref 4.4–5.9)
SODIUM SERPL-SCNC: 133 MMOL/L (ref 135–145)
TACROLIMUS BLD-MCNC: 8.4 UG/L (ref 5–15)
TME LAST DOSE: NORMAL H
TME LAST DOSE: NORMAL H
UFH PPP CHRO-ACNC: 0.58 IU/ML (ref ?–1.1)
UNIT ABO/RH: NORMAL
UNIT NUMBER: NORMAL
UNIT STATUS: NORMAL
UNIT TYPE ISBT: 5100
WBC # BLD AUTO: 2.9 10E3/UL (ref 4–11)

## 2025-07-01 PROCEDURE — 250N000011 HC RX IP 250 OP 636: Performed by: PHYSICIAN ASSISTANT

## 2025-07-01 PROCEDURE — 85018 HEMOGLOBIN: CPT

## 2025-07-01 PROCEDURE — 85610 PROTHROMBIN TIME: CPT

## 2025-07-01 PROCEDURE — 250N000013 HC RX MED GY IP 250 OP 250 PS 637

## 2025-07-01 PROCEDURE — P9016 RBC LEUKOCYTES REDUCED: HCPCS

## 2025-07-01 PROCEDURE — 99207 PR SC NO CHARGE VISIT/PATIENT NOT SEEN: CPT | Performed by: NURSE PRACTITIONER

## 2025-07-01 PROCEDURE — 258N000003 HC RX IP 258 OP 636: Performed by: PHYSICIAN ASSISTANT

## 2025-07-01 PROCEDURE — 80197 ASSAY OF TACROLIMUS: CPT | Performed by: NURSE PRACTITIONER

## 2025-07-01 PROCEDURE — 99233 SBSQ HOSP IP/OBS HIGH 50: CPT | Mod: GC | Performed by: STUDENT IN AN ORGANIZED HEALTH CARE EDUCATION/TRAINING PROGRAM

## 2025-07-01 PROCEDURE — 99233 SBSQ HOSP IP/OBS HIGH 50: CPT | Performed by: PHYSICIAN ASSISTANT

## 2025-07-01 PROCEDURE — 90935 HEMODIALYSIS ONE EVALUATION: CPT

## 2025-07-01 PROCEDURE — 36415 COLL VENOUS BLD VENIPUNCTURE: CPT

## 2025-07-01 PROCEDURE — 120N000011 HC R&B TRANSPLANT UMMC

## 2025-07-01 PROCEDURE — 250N000013 HC RX MED GY IP 250 OP 250 PS 637: Performed by: STUDENT IN AN ORGANIZED HEALTH CARE EDUCATION/TRAINING PROGRAM

## 2025-07-01 PROCEDURE — 250N000012 HC RX MED GY IP 250 OP 636 PS 637: Performed by: NURSE PRACTITIONER

## 2025-07-01 PROCEDURE — 250N000011 HC RX IP 250 OP 636

## 2025-07-01 PROCEDURE — 83735 ASSAY OF MAGNESIUM: CPT

## 2025-07-01 PROCEDURE — 80053 COMPREHEN METABOLIC PANEL: CPT

## 2025-07-01 PROCEDURE — 85520 HEPARIN ASSAY: CPT | Performed by: INTERNAL MEDICINE

## 2025-07-01 PROCEDURE — 84100 ASSAY OF PHOSPHORUS: CPT

## 2025-07-01 RX ORDER — WARFARIN SODIUM 5 MG/1
5 TABLET ORAL
Status: DISCONTINUED | OUTPATIENT
Start: 2025-07-01 | End: 2025-07-01

## 2025-07-01 RX ORDER — TACROLIMUS 0.5 MG/1
0.5 CAPSULE ORAL EVERY 12 HOURS
Status: DISCONTINUED | OUTPATIENT
Start: 2025-07-01 | End: 2025-07-03

## 2025-07-01 RX ORDER — ALBUMIN (HUMAN) 12.5 G/50ML
50 SOLUTION INTRAVENOUS
Status: DISCONTINUED | OUTPATIENT
Start: 2025-07-01 | End: 2025-07-01

## 2025-07-01 RX ORDER — WARFARIN SODIUM 4 MG/1
4 TABLET ORAL
Status: COMPLETED | OUTPATIENT
Start: 2025-07-01 | End: 2025-07-01

## 2025-07-01 RX ADMIN — PANTOPRAZOLE SODIUM 40 MG: 40 TABLET, DELAYED RELEASE ORAL at 10:43

## 2025-07-01 RX ADMIN — PANTOPRAZOLE SODIUM 40 MG: 40 TABLET, DELAYED RELEASE ORAL at 19:53

## 2025-07-01 RX ADMIN — HEPARIN SODIUM 2000 UNITS: 1000 INJECTION, SOLUTION INTRAVENOUS; SUBCUTANEOUS at 08:04

## 2025-07-01 RX ADMIN — MIDODRINE HYDROCHLORIDE 20 MG: 5 TABLET ORAL at 21:44

## 2025-07-01 RX ADMIN — ACETAMINOPHEN 650 MG: 325 TABLET ORAL at 10:42

## 2025-07-01 RX ADMIN — TACROLIMUS 0.5 MG: 0.5 CAPSULE ORAL at 19:53

## 2025-07-01 RX ADMIN — SODIUM CHLORIDE 200 ML: 0.9 INJECTION, SOLUTION INTRAVENOUS at 07:39

## 2025-07-01 RX ADMIN — ROPINIROLE HYDROCHLORIDE 0.5 MG: 0.5 TABLET, FILM COATED ORAL at 19:53

## 2025-07-01 RX ADMIN — DROXIDOPA 100 MG: 100 CAPSULE ORAL at 10:53

## 2025-07-01 RX ADMIN — ROPINIROLE HYDROCHLORIDE 0.5 MG: 0.5 TABLET, FILM COATED ORAL at 10:42

## 2025-07-01 RX ADMIN — OXYCODONE HYDROCHLORIDE 5 MG: 5 TABLET ORAL at 19:02

## 2025-07-01 RX ADMIN — DROXIDOPA 100 MG: 100 CAPSULE ORAL at 00:09

## 2025-07-01 RX ADMIN — DROXIDOPA 100 MG: 100 CAPSULE ORAL at 13:36

## 2025-07-01 RX ADMIN — LACOSAMIDE 50 MG: 50 TABLET, FILM COATED ORAL at 10:43

## 2025-07-01 RX ADMIN — ASPIRIN 81 MG CHEWABLE TABLET 81 MG: 81 TABLET CHEWABLE at 10:43

## 2025-07-01 RX ADMIN — MIDODRINE HYDROCHLORIDE 20 MG: 5 TABLET ORAL at 07:20

## 2025-07-01 RX ADMIN — Medication: at 07:40

## 2025-07-01 RX ADMIN — ACETAMINOPHEN 650 MG: 325 TABLET ORAL at 19:02

## 2025-07-01 RX ADMIN — OXYCODONE HYDROCHLORIDE 5 MG: 5 TABLET ORAL at 13:44

## 2025-07-01 RX ADMIN — HEPARIN SODIUM 1700 UNITS/HR: 10000 INJECTION, SOLUTION INTRAVENOUS at 12:14

## 2025-07-01 RX ADMIN — SODIUM CHLORIDE 250 ML: 0.9 INJECTION, SOLUTION INTRAVENOUS at 07:40

## 2025-07-01 RX ADMIN — MIDODRINE HYDROCHLORIDE 20 MG: 5 TABLET ORAL at 14:41

## 2025-07-01 RX ADMIN — CEFTRIAXONE SODIUM 2 G: 2 INJECTION, POWDER, FOR SOLUTION INTRAMUSCULAR; INTRAVENOUS at 10:42

## 2025-07-01 RX ADMIN — WARFARIN SODIUM 4 MG: 4 TABLET ORAL at 18:33

## 2025-07-01 RX ADMIN — OXYCODONE HYDROCHLORIDE 5 MG: 5 TABLET ORAL at 08:03

## 2025-07-01 RX ADMIN — DROXIDOPA 100 MG: 100 CAPSULE ORAL at 19:53

## 2025-07-01 ASSESSMENT — ACTIVITIES OF DAILY LIVING (ADL)
ADLS_ACUITY_SCORE: 42

## 2025-07-01 NOTE — PROGRESS NOTES
BP (!) 86/59 (BP Location: Right arm)   Pulse 85   Temp 98.7  F (37.1  C) (Oral)   Resp (!) 80   Wt 95.1 kg (209 lb 11.2 oz)   SpO2 98%   BMI 28.44 kg/m    Shift: 3529-2146  Isolation Status: standard  VS: low BPs on room air (intermittently needing 1L nc), afebrile  Neuro: Aox4, intermittently confused (situation)  Behaviors: able to make needs known  BG: none  Labs: Na 133, Hgb 6.3 (1U PRBCs given), creat 2.97  Respiratory: WDL  Cardiac: WDL  Pain/Nausea: L arm pain, denies nausea  PRN: oxycodone, tylenol  Diet: renal diet + 1.2L FR  IV Access: R PIV x3  Infusion(s): IV abx, RN managed heparin gtt @1700U  Lines/Drains: none  GI/: anuric on HD, no BM on shift  Skin: bruising on fistula arm  Mobility: SBA  Events/Education: dialysis today 3L off, hypotensive  during/since  Plan: continue POC and notify team of changes

## 2025-07-01 NOTE — PROGRESS NOTES
BP 93/62 (BP Location: Right arm, Cuff Size: Adult Regular)   Pulse 59   Temp 97.6  F (36.4  C) (Oral)   Resp 17   Wt 95.1 kg (209 lb 11.2 oz)   SpO2 100%   BMI 28.44 kg/m      Shift: 5712-5033    VS: hypotensive  Neuro: intermittently confused to situation  Respiratory: WDL on 1/2L  : anuric on HD  GI: no BM this shift  Diet: renal    BG: none  LDA: LAVF, R PIV x3   Infusions: Voiding adequately  Mobility: SBA  Pain/Nausea: pain in L AVF and abd  PRN medications: droxidopa x1, oxy x1, Tylenol x1

## 2025-07-01 NOTE — PROGRESS NOTES
HEMODIALYSIS TREATMENT NOTE    Date: 7/1/2025  Time: 10:24 AM    Data:  Pre Wt: 95.1 kg (209 lb 10.5 oz)   Desired Wt: 92.1  kg   Post Wt: 92.1 kg (203 lb 0.7 oz)  Weight change: 3 kg  Ultrafiltration - Post Run Net Total Removed (mL): 3000 mL  Vascular Access Status: CVC  patent  Dialyzer Rinse: Streaked  Total Blood Volume Processed: 0 L   Total Dialysis (Treatment) Time: 2.5   Dialysate Bath: UF only  Heparin: None    Lab:   No    Interventions:  Pt completed to 2.5 hrs of isolated UF only dialysis tx through right tunneled CVC. Pt was able to tolerate 3 L fluid removal. Pt was given PRN oxycodone for pain and was effective. Pt received a unit of blood. No adverse reaction noted. 400 BFR achieved with good pressure. CVC lumens locked with saline and capped with clear guard. Handoff report given to PETER Garcia. Pt transferred back to room 7206 in stable condition.    Assessment:  Alert and oriented x4  Intermittently confused at times  VSS except soft BP  On 1L O2 via NC  CVC patent and CDI     Plan:    Next HD per renal

## 2025-07-01 NOTE — PROVIDER NOTIFICATION
Paged Provider: Bp 85/54. Do you want me to give the 0000 midodrine early?    Response: yes, give midodrine early

## 2025-07-01 NOTE — PROGRESS NOTES
Nephrology Progress Note  07/01/2025         Assessment & Recommendations:   Blanco Osborne is a 59 yo male with Pmhx ESRD (on HD MWF since 1/23), NAFLD s/p liver transplant (9/2016), PEA arrest 2/2 hypoxic respiratory failure (2022), pAF, CVA, HLD, GERD, CHRISTIAN on CPAP, RLS, and hx seizures. Presented to [facility] ED for hypoxia and AMS. Appeared to be volume overloaded on ED exam, subsequently admitted to [facility] 6/26 for HD. However after HD pt was persistently hypotensive despite midodrine, therefore started on pressors and transferred to [facility] given bed availability. Treating for shock 2/2 presumed infection, also found to have PE.     #ESKD: dialyzes MWF at [facility] with Dr. Bradshaw. Access: tunneled RIJ (clotted L AVF s/p revision with Dr Mcneil 6/24/25, not to be used for 1 month). Run time: 3.5 hrs. TW 89.5 kg. Active on tx list here and is also being evaluated at [place] for SLK  - dialysis per MWF schedule with extra UF only runs on the other days     #BP/Volume: anuric, TW 89.5 kg   - hx of intradialytic hypotension   - evidence of volume overload on CXR and echo; and hepatology indicates likely congestive hepatopathy  - echo 6/27 with EF 60-65%, flattened septum c/w R ventricular pressure and volume overload (also in setting of new PE), IVC diameter and resp changes in intermediate range; no pericardial effusion  - s/p 3 L paracentesis on 6/27    -  on midodrine 20 mg TID  - ordered prn midodrine 10 mg for use during HD   - ok for SBP > 85 if asymptomatic on dialysis  - pre HD standing weight 95.1 kg  - UF 3L today  - ordered 1200 ml fluid restriction  - need to reach and then likely challenge TW     #Pancytopenia:  #Anemia of CKD: on mircera 30 mcg [id number]  - hgb 6-7's. Has had some blood loss related to leaking around CVC  - ordered epogen 4000 units per HD  - transfusion per primary team     #BMD: Ca 8's, alb 3.4, phos 3.8     #ID: Initially on vanc/zosyn now only on ceftriaxone for  "possible HAP     #Afib: has been on DOAC vs warfarin on and off  #New PE: on heparin  - CT with \"PE in posterior segmental and subsegmental arterial branches in the left upper lobe. No evidence of right heart strain.\"    JEANCARLOS Fowler   Division of Nephrology and Hypertension  Vocera Web Console    Recommendations communicated to primary team via this note    Interval History :   Seen on dialysis, UF only run for 3L today. Attempting to reach and then challenge TW as able. Ordered fluid restriction. No n/v, CP, SOB, chills    Review of Systems:   4 point ROS neg other than as noted above    Physical Exam:   I/O last 3 completed shifts:  In: 636 [P.O.:360; I.V.:176; IV Piggyback:100]  Out: 2000 [Other:2000]   BP (!) 87/59   Pulse 59   Temp 97.6  F (36.4  C) (Oral)   Resp 15   Wt 95.1 kg (209 lb 11.2 oz)   SpO2 100%   BMI 28.44 kg/m       GENERAL APPEARANCE: NAD  EYES: no scleral icterus, pupils equal  Pulmonary: diminished  CV: RRR   - Edema trace LE, slightly distended abdomen  GI: soft, nontender  MS: no evidence of inflammation in joints, no muscle tenderness  : no gomez  SKIN: no rash  NEURO: face symmetric, a/o3  Access: tunneled RIJ (newly revised L AVF)    Labs:   All labs reviewed by me  Electrolytes/Renal -   Recent Labs   Lab Test 07/01/25  0452 06/30/25  0634 06/29/25  0457   * 136 137   POTASSIUM 3.7 3.9 3.9   CHLORIDE 96* 97* 98   CO2 24 24 26   BUN 25.8* 38.5* 27.4*   CR 3.97* 6.33* 5.04*   GLC 93 95 130*   KELLY 8.7* 8.9 8.9   MAG 1.7 1.8 1.9   PHOS 3.8 4.8* 3.6       CBC -   Recent Labs   Lab Test 07/01/25  0452 06/30/25  1642 06/30/25  0910   WBC 2.9* 4.2 3.4*   HGB 6.3* 7.0* 6.6*   PLT 72* 85* 74*       LFTs -   Recent Labs   Lab Test 07/01/25  0452 06/30/25  0634 06/29/25  0457   ALKPHOS 114 110 138   BILITOTAL 0.5 0.6 0.7   ALT 10 11 15   AST 24 28 40   PROTTOTAL 6.2* 6.2* 6.5   ALBUMIN 3.6 3.4* 3.7       Iron Panel -   Recent Labs   Lab Test 10/24/23  0537 04/14/23  0725 " 04/06/23  0808 03/25/23  1058 03/12/23  0756   IRON 38* 60* 78  --  37*   IRONSAT 20 29  --   --  26   HOSSEIN 423* 286  --    < > 252    < > = values in this interval not displayed.         Imaging:  All imaging reviewed by me.     Current Medications:  Current Facility-Administered Medications   Medication Dose Route Frequency Provider Last Rate Last Admin    aspirin EC tablet 81 mg  81 mg Oral Daily Rashida Olivia MD   81 mg at 06/30/25 0752    cefTRIAXone (ROCEPHIN) 2 g vial to attach to  ml bag for ADULTS or NS 50 ml bag for PEDS  2 g Intravenous Q24H Katia Garza   2 g at 06/30/25 1010    droxidopa (NORTHERA) capsule 100 mg  100 mg Oral TID Justyna Us        lacosamide (VIMPAT) tablet 50 mg  50 mg Oral Daily Rashida Olivia MD   50 mg at 06/30/25 0752    midodrine (PROAMATINE) tablet 20 mg  20 mg Oral Q8H Mary Beth Duvall MD   20 mg at 07/01/25 0720    pantoprazole (PROTONIX) EC tablet 40 mg  40 mg Oral BID Rashida Olivia MD   40 mg at 06/30/25 1951    rOPINIRole (REQUIP) tablet 0.5 mg  0.5 mg Oral BID Rashida Olivia MD   0.5 mg at 06/30/25 1951    sodium chloride (PF) 0.9% PF flush 9 mL  9 mL Intracatheter During Dialysis/CRRT (from stock) Teressa Olivas PA        sodium chloride (PF) 0.9% PF flush 9 mL  9 mL Intracatheter During Dialysis/CRRT (from stock) Teressa Olivas PA        [Held by provider] tacrolimus (GENERIC EQUIVALENT) capsule 1 mg  1 mg Oral Q12H Rashida Olivia MD   1 mg at 06/30/25 0751    thrombin 5000 units EPISTAXIS kit   Topical Once Mary Beth Duvall MD         Current Facility-Administered Medications   Medication Dose Route Frequency Provider Last Rate Last Admin    heparin 25,000 units in 0.45% NaCl 250 mL ANTICOAGULANT infusion  0-5,000 Units/hr Intravenous Continuous Katia Garza 17 mL/hr at 06/30/25 1955 1,700 Units/hr at 06/30/25 1955     Teressa Olivas, PA

## 2025-07-01 NOTE — PHARMACY-ANTICOAGULATION SERVICE
Clinical Pharmacy - Warfarin Dosing Consult     Pharmacy has been consulted to manage this patient s warfarin therapy.  Indication: DVT/ PE Treatment  Therapy Goal: INR 2-3  Provider/Team: Eusebio Gutierrez  Warfarin Prior to Admission: Yes  Warfarin PTA Regimen: 8 mg Mon/Fri, 6 mg ROW  Significant drug interactions: ceftriaxone (may increase INR), aspirin and heparin (increase bleeding risk)  Recent documented change in oral intake/nutrition: No    INR   Date Value Ref Range Status   07/01/2025 1.28 (H) 0.85 - 1.15 Final   06/30/2025 1.34 (H) 0.85 - 1.15 Final       Recommend warfarin 4 mg today.  Pharmacy will monitor Blanco Osborne daily and order warfarin doses to achieve specified goal.      Please contact pharmacy as soon as possible if the warfarin needs to be held for a procedure or if the warfarin goals change.      Darline Joe [facility]

## 2025-07-01 NOTE — PROGRESS NOTES
"Brief Hepatology note:    Transplant: Liver  Date of transplant: 9/20/16  Reason for tx: metALD  Post op course c/b: PEA arrest (2022), CKD on HD and listed for kidney transplant, A-fib, recurrent cirrhosis, PE, stress cardiomyopathy, cardiac ascites    Current immunosuppression: tacrolimus 1 mg BID (currently on hold)    Lab Results   Component Value Date    AST 24 07/01/2025     04/20/2020     Lab Results   Component Value Date    ALT 10 07/01/2025    ALT 72 04/20/2020     No results found for: \"BILICONJ\"   Lab Results   Component Value Date    BILITOTAL 0.5 07/01/2025    BILITOTAL 1.0 04/20/2020     Lab Results   Component Value Date    ALBUMIN 3.6 07/01/2025    ALBUMIN 1.8 12/29/2022    ALBUMIN 3.9 04/20/2020     Lab Results   Component Value Date    PROTTOTAL 6.2 07/01/2025    PROTTOTAL 7.9 04/20/2020      Lab Results   Component Value Date    ALKPHOS 114 07/01/2025    ALKPHOS 189 04/20/2020       Creatinine   Date Value Ref Range Status   07/01/2025 3.97 (H) 0.67 - 1.17 mg/dL Final   04/20/2020 1.06 0.66 - 1.25 mg/dL Final   ]    WBC   Date Value Ref Range Status   04/20/2020 4.0 4.0 - 11.0 10e9/L Final     WBC Count   Date Value Ref Range Status   07/01/2025 2.9 (L) 4.0 - 11.0 10e3/uL Final   ]    Immunosuppressant Medications       Immunosuppressive Agents Disp Start End     tacrolimus (GENERIC EQUIVALENT) capsule 1 mg ([Held by provider] since 6/30/2025  5:20 PM) -- 6/27/2025 --     1 mg, Oral, EVERY 12 HOURS, Check if DRUG LEVEL is needed BEFORE administering dose.    Class: E-Prescribe    Notes to Pharmacy: PTA Sig:TAKE 1 CAPSULE BY MOUTH EVERY 12 HOURS      No prior authorization was found for this prescription.    Found prior authorization for another prescription for the same medication: Closed              Tacrolimus level: 8.4  Goal tac level:  4-6  Creatinine: 3.97 on HD    Plan:   # DDLT 9/20/16 for metALD  # Immunosuppression  # CKD on HD  - has recent Acute rejection seen on liver biopsy " in 5/2025. His tacrolimus level was elevated to 10.2 and his tac was held. Plan for restarting his tacrolimus at 0.5 mg BID and will also have repeat trough levels for the following two days.   - Remains on HD.   - ascites consistent with congestive hepatopathy. Will need to follow with cardiology and remains on anticoagulation.       STEFFANY Phillips, CNP  Inpatient hepatology [facility]  [phone]

## 2025-07-02 LAB
ALBUMIN SERPL BCG-MCNC: 3.6 G/DL (ref 3.5–5.2)
ALP SERPL-CCNC: 124 U/L (ref 40–150)
ALT SERPL W P-5'-P-CCNC: 8 U/L (ref 0–70)
ANION GAP SERPL CALCULATED.3IONS-SCNC: 15 MMOL/L (ref 7–15)
AST SERPL W P-5'-P-CCNC: 22 U/L (ref 0–45)
BACTERIA FLD CULT: NO GROWTH
BILIRUB SERPL-MCNC: 0.5 MG/DL
BUN SERPL-MCNC: 39.1 MG/DL (ref 8–23)
CALCIUM SERPL-MCNC: 9.1 MG/DL (ref 8.8–10.4)
CHLORIDE SERPL-SCNC: 94 MMOL/L (ref 98–107)
CREAT SERPL-MCNC: 5.5 MG/DL (ref 0.67–1.17)
EGFRCR SERPLBLD CKD-EPI 2021: 11 ML/MIN/1.73M2
ERYTHROCYTE [DISTWIDTH] IN BLOOD BY AUTOMATED COUNT: 20.8 % (ref 10–15)
GLUCOSE SERPL-MCNC: 96 MG/DL (ref 70–99)
GRAM STAIN RESULT: NORMAL
GRAM STAIN RESULT: NORMAL
HCO3 SERPL-SCNC: 22 MMOL/L (ref 22–29)
HCT VFR BLD AUTO: 26.6 % (ref 40–53)
HGB BLD-MCNC: 8 G/DL (ref 13.3–17.7)
INR PPP: 1.24 (ref 0.85–1.15)
LACTATE SERPL-SCNC: 0.5 MMOL/L (ref 0.7–2)
MAGNESIUM SERPL-MCNC: 1.8 MG/DL (ref 1.7–2.3)
MCH RBC QN AUTO: 33.2 PG (ref 26.5–33)
MCHC RBC AUTO-ENTMCNC: 30.1 G/DL (ref 31.5–36.5)
MCV RBC AUTO: 110 FL (ref 78–100)
PHOSPHATE SERPL-MCNC: 4.7 MG/DL (ref 2.5–4.5)
PLATELET # BLD AUTO: 90 10E3/UL (ref 150–450)
POTASSIUM SERPL-SCNC: 4 MMOL/L (ref 3.4–5.3)
PROT SERPL-MCNC: 6.8 G/DL (ref 6.4–8.3)
PROTHROMBIN TIME: 15.5 SECONDS (ref 11.8–14.8)
RBC # BLD AUTO: 2.41 10E6/UL (ref 4.4–5.9)
SODIUM SERPL-SCNC: 131 MMOL/L (ref 135–145)
TACROLIMUS BLD-MCNC: 8.1 UG/L (ref 5–15)
TME LAST DOSE: NORMAL H
TME LAST DOSE: NORMAL H
UFH PPP CHRO-ACNC: 0.45 IU/ML (ref ?–1.1)
WBC # BLD AUTO: 5 10E3/UL (ref 4–11)

## 2025-07-02 PROCEDURE — 83605 ASSAY OF LACTIC ACID: CPT | Performed by: PHYSICIAN ASSISTANT

## 2025-07-02 PROCEDURE — 36415 COLL VENOUS BLD VENIPUNCTURE: CPT | Performed by: NURSE PRACTITIONER

## 2025-07-02 PROCEDURE — 250N000013 HC RX MED GY IP 250 OP 250 PS 637

## 2025-07-02 PROCEDURE — 250N000011 HC RX IP 250 OP 636: Performed by: PHYSICIAN ASSISTANT

## 2025-07-02 PROCEDURE — 85520 HEPARIN ASSAY: CPT | Performed by: TRANSPLANT SURGERY

## 2025-07-02 PROCEDURE — 85027 COMPLETE CBC AUTOMATED: CPT

## 2025-07-02 PROCEDURE — 83735 ASSAY OF MAGNESIUM: CPT

## 2025-07-02 PROCEDURE — 99232 SBSQ HOSP IP/OBS MODERATE 35: CPT | Performed by: NURSE PRACTITIONER

## 2025-07-02 PROCEDURE — 84100 ASSAY OF PHOSPHORUS: CPT

## 2025-07-02 PROCEDURE — P9047 ALBUMIN (HUMAN), 25%, 50ML: HCPCS | Performed by: PHYSICIAN ASSISTANT

## 2025-07-02 PROCEDURE — 250N000012 HC RX MED GY IP 250 OP 636 PS 637: Performed by: NURSE PRACTITIONER

## 2025-07-02 PROCEDURE — 99233 SBSQ HOSP IP/OBS HIGH 50: CPT | Mod: GC | Performed by: STUDENT IN AN ORGANIZED HEALTH CARE EDUCATION/TRAINING PROGRAM

## 2025-07-02 PROCEDURE — 80197 ASSAY OF TACROLIMUS: CPT | Performed by: NURSE PRACTITIONER

## 2025-07-02 PROCEDURE — 82310 ASSAY OF CALCIUM: CPT

## 2025-07-02 PROCEDURE — 250N000013 HC RX MED GY IP 250 OP 250 PS 637: Performed by: STUDENT IN AN ORGANIZED HEALTH CARE EDUCATION/TRAINING PROGRAM

## 2025-07-02 PROCEDURE — 90935 HEMODIALYSIS ONE EVALUATION: CPT

## 2025-07-02 PROCEDURE — 634N000001 HC RX 634: Mod: JZ | Performed by: STUDENT IN AN ORGANIZED HEALTH CARE EDUCATION/TRAINING PROGRAM

## 2025-07-02 PROCEDURE — 99233 SBSQ HOSP IP/OBS HIGH 50: CPT | Performed by: PHYSICIAN ASSISTANT

## 2025-07-02 PROCEDURE — 250N000013 HC RX MED GY IP 250 OP 250 PS 637: Performed by: PHYSICIAN ASSISTANT

## 2025-07-02 PROCEDURE — 250N000011 HC RX IP 250 OP 636

## 2025-07-02 PROCEDURE — 258N000003 HC RX IP 258 OP 636: Performed by: PHYSICIAN ASSISTANT

## 2025-07-02 PROCEDURE — 120N000011 HC R&B TRANSPLANT UMMC

## 2025-07-02 PROCEDURE — 82947 ASSAY GLUCOSE BLOOD QUANT: CPT

## 2025-07-02 PROCEDURE — 85610 PROTHROMBIN TIME: CPT

## 2025-07-02 RX ORDER — ALBUMIN (HUMAN) 12.5 G/50ML
50 SOLUTION INTRAVENOUS
Status: DISCONTINUED | OUTPATIENT
Start: 2025-07-02 | End: 2025-07-02

## 2025-07-02 RX ORDER — WARFARIN SODIUM 6 MG/1
6 TABLET ORAL
Status: COMPLETED | OUTPATIENT
Start: 2025-07-02 | End: 2025-07-02

## 2025-07-02 RX ADMIN — ASPIRIN 81 MG CHEWABLE TABLET 81 MG: 81 TABLET CHEWABLE at 07:26

## 2025-07-02 RX ADMIN — OXYCODONE HYDROCHLORIDE 5 MG: 5 TABLET ORAL at 01:44

## 2025-07-02 RX ADMIN — EPOETIN ALFA-EPBX 4000 UNITS: 10000 INJECTION, SOLUTION INTRAVENOUS; SUBCUTANEOUS at 10:04

## 2025-07-02 RX ADMIN — PANTOPRAZOLE SODIUM 40 MG: 40 TABLET, DELAYED RELEASE ORAL at 19:44

## 2025-07-02 RX ADMIN — HEPARIN SODIUM 1900 UNITS: 1000 INJECTION, SOLUTION INTRAVENOUS; SUBCUTANEOUS at 11:04

## 2025-07-02 RX ADMIN — MIDODRINE HYDROCHLORIDE 20 MG: 5 TABLET ORAL at 16:05

## 2025-07-02 RX ADMIN — WARFARIN SODIUM 6 MG: 6 TABLET ORAL at 17:47

## 2025-07-02 RX ADMIN — ROPINIROLE HYDROCHLORIDE 0.5 MG: 0.5 TABLET, FILM COATED ORAL at 07:26

## 2025-07-02 RX ADMIN — CEFTRIAXONE SODIUM 2 G: 2 INJECTION, POWDER, FOR SOLUTION INTRAMUSCULAR; INTRAVENOUS at 12:17

## 2025-07-02 RX ADMIN — ACETAMINOPHEN 650 MG: 325 TABLET ORAL at 05:45

## 2025-07-02 RX ADMIN — ONDANSETRON 4 MG: 2 INJECTION INTRAMUSCULAR; INTRAVENOUS at 08:47

## 2025-07-02 RX ADMIN — TACROLIMUS 0.5 MG: 0.5 CAPSULE ORAL at 07:26

## 2025-07-02 RX ADMIN — Medication: at 08:24

## 2025-07-02 RX ADMIN — HEPARIN SODIUM 1700 UNITS/HR: 10000 INJECTION, SOLUTION INTRAVENOUS at 16:38

## 2025-07-02 RX ADMIN — DROXIDOPA 100 MG: 100 CAPSULE ORAL at 19:44

## 2025-07-02 RX ADMIN — MIDODRINE HYDROCHLORIDE 20 MG: 5 TABLET ORAL at 07:26

## 2025-07-02 RX ADMIN — OXYCODONE HYDROCHLORIDE 5 MG: 5 TABLET ORAL at 10:03

## 2025-07-02 RX ADMIN — OXYCODONE HYDROCHLORIDE 5 MG: 5 TABLET ORAL at 17:47

## 2025-07-02 RX ADMIN — ALBUMIN HUMAN 50 ML: 0.25 SOLUTION INTRAVENOUS at 10:46

## 2025-07-02 RX ADMIN — DROXIDOPA 100 MG: 100 CAPSULE ORAL at 09:58

## 2025-07-02 RX ADMIN — ALBUMIN HUMAN 50 ML: 0.25 SOLUTION INTRAVENOUS at 08:22

## 2025-07-02 RX ADMIN — ACETAMINOPHEN 650 MG: 325 TABLET ORAL at 16:04

## 2025-07-02 RX ADMIN — LACOSAMIDE 50 MG: 50 TABLET, FILM COATED ORAL at 07:26

## 2025-07-02 RX ADMIN — SODIUM CHLORIDE 200 ML: 0.9 INJECTION, SOLUTION INTRAVENOUS at 08:23

## 2025-07-02 RX ADMIN — DROXIDOPA 100 MG: 100 CAPSULE ORAL at 13:35

## 2025-07-02 RX ADMIN — MIDODRINE HYDROCHLORIDE 10 MG: 5 TABLET ORAL at 09:58

## 2025-07-02 RX ADMIN — TACROLIMUS 0.5 MG: 0.5 CAPSULE ORAL at 19:44

## 2025-07-02 RX ADMIN — HEPARIN SODIUM 1700 UNITS/HR: 10000 INJECTION, SOLUTION INTRAVENOUS at 01:46

## 2025-07-02 RX ADMIN — MIDODRINE HYDROCHLORIDE 20 MG: 5 TABLET ORAL at 21:52

## 2025-07-02 RX ADMIN — PANTOPRAZOLE SODIUM 40 MG: 40 TABLET, DELAYED RELEASE ORAL at 07:26

## 2025-07-02 RX ADMIN — OXYCODONE HYDROCHLORIDE 5 MG: 5 TABLET ORAL at 05:46

## 2025-07-02 RX ADMIN — ROPINIROLE HYDROCHLORIDE 0.5 MG: 0.5 TABLET, FILM COATED ORAL at 19:43

## 2025-07-02 RX ADMIN — SODIUM CHLORIDE 250 ML: 0.9 INJECTION, SOLUTION INTRAVENOUS at 08:23

## 2025-07-02 ASSESSMENT — ACTIVITIES OF DAILY LIVING (ADL)
ADLS_ACUITY_SCORE: 42

## 2025-07-02 NOTE — PROGRESS NOTES
HEMODIALYSIS TREATMENT NOTE    Date: 7/2/2025  Time: 9:18 AM    Data:  Modality: standing   Pre Wt: 94.2 kg (207 lb 10.8 oz)   Desired Wt:   kg   Post Wt:  91.7 kg (203 lb 0.7 oz)  Weight change: 2.5 kg  Ultrafiltration - Post Run Net Total Removed (mL):  2500 mL  Vascular Access Site: RIJ PCAD  Dialyzer Rinse: Streaked  Total Blood Volume Processed: 74.98 L Liters  Total Dialysis (Treatment) Time: 3.5 Hours  Dialysate Bath: K 3, Ca 2.25  Heparin: Heparin: None    Lab:   No    Interventions:  Dialysis done through: RIJ PCAD  Catheter aspirated and flushed without difficulty.  UF set to 3 Liters of fluid removal, accommodating priming and rinse back volumes  BFR: Blood Flow Rate (BFR): 400 mL/min  DFR: Potassium/Calcium Rate: 600 mL/min  See administered medications per MAR  CVC dressing C/D/I.  Catheter lumens locked with heparin 1:1000 units, cath guards changed post HD  CritLine stable throughout the run, unable to pull fluid removal goal, see flowsheets for additional information.    Report given to PCN, sent back to [facility] room in stable condition.    Assessment:  A/O x 4, calm & cooperative, c/o pain in left arm, rates pain 7/10 scale. Medicated with oxycodone 5 mg po x1 with relief.  Lung sounds clear anteriorly, oxygen at 1L/NC, denies shortness of breath.  Access site intact, previous dressing clean and dry.  Pt had episode of n/v. Small emesis. Medicated with zofran 4 mg slow IVP with relief.  Pt c/o feeling lightheaded and dizzy last 30 min of treatment. UF off. Rinsed pt back 10 min early. Pt feeling better after rinse back.  Pt received midodrine as ordered and albumin 25% 100 ml for B/P support.  Report given to floor RN.  Plan:    Per Renal team

## 2025-07-02 NOTE — PHARMACY-ADMISSION MEDICATION HISTORY
Pharmacist Admission Medication History    Admission medication history was completed 6/26/2025 by a pharmacist at [facility] prior to transfer here. Please see note for complete details.    Elma Martinez, BitaD, BCPS

## 2025-07-02 NOTE — PROGRESS NOTES
BP (!) 83/49 (BP Location: Right arm, Cuff Size: Adult Regular)   Pulse 70   Temp 97.4  F (36.3  C) (Oral)   Resp 20   Wt 94.2 kg (207 lb 11.2 oz)   SpO2 95%   BMI 28.17 kg/m      Shift: 3401-4788  Isolation Status: standard  VS: hypotensive, afebrile  Neuro: Aox4, able to answer orientation questions-- intermittently confused on situation  Behaviors: bed alarm on for safety  BG: none  Labs: Na 131, creat 5.50, hgb 8.0, lactic 0.5  Respiratory: on RA, intermittently needing 1L  Cardiac: WDL  Pain/Nausea: L arm pain, denies nausea  PRN: tylenol, oxycodone  Diet: renal diet + 1.2L FR, NPO @0700  IV Access: R HD CVC, R PIV x3  Infusion(s): RN managed heparin @1700  Lines/Drains: N/A  GI/: anuric, BM x2 today  Skin: L arm bruising   Mobility: SBA  Events/Education: dialysis today-- 2.5L off  Plan: continue POC and notify team of changes. Plan for dialysis tomorrow @0740, EGD tomorrow afternoon NPO @0700

## 2025-07-02 NOTE — PLAN OF CARE
BP 93/61 (BP Location: Right arm)   Pulse 66   Temp 97.9  F (36.6  C) (Oral)   Resp 14   Wt 94.2 kg (207 lb 11.2 oz)   SpO2 98%   BMI 28.17 kg/m      Shift: 1900-700  VS: Hypotensive (team aware), all other stable, 1L nasal cannula, afebrile  Neuro: Alert and oriented x4, intermittent confusion   Labs: Awaiting AM labs   Pain/Nausea: Pain managed with prn oxy x2, prn tylenol x1, denies nausea   Diet: Renal diet, 1.2 L FR  IV Access: R tunneled HD line, R PIV x3  Infusion(s): Heparin at 1700  GI/: Anuric on HD, LBM 7/11  Skin: Para site CDI, L arm bruising from fistula re cannulation   Mobility: Up SBA, walked in halls with staff, bed alarm on overnight   Plan: Continue with POC and notify team with any changes   Goal Outcome Evaluation:      Plan of Care Reviewed With: patient    Overall Patient Progress: no changeOverall Patient Progress: no change    Outcome Evaluation: Hypotensive overnight, otherwise vitals stable, intermittent confusion, pain managed well

## 2025-07-02 NOTE — PROGRESS NOTES
HEPATOLOGY PROGRESS NOTE  Patient:  Blanco Osborne, Date of birth 1965  Date of Visit:  07/02/2025  Referring Provider Meredith Vanessa         IMPRESSION:  Blanco Osborne is a 60 year old male with a history of DDLT 9/20/16 for metALD cirrhosis with a postoperative course complicated by PEA arrest (2022), CKD on HD and listed for kidney transplant, A-fib, recurrent cirrhosis, who was admitted after missing several days of HD and noted to have hypotension during last session with findings of PE on imaging and pulmonary hypertension.      RECOMMENDATIONS:    Updates for 07/02/2025 :  - EGD in am.     # DDLT 9/20/2016 for metALD  # Immunosuppression  # Hepatic fibrosis  # Ascites  # Congestive hepatopathy  - Recent liver biopsy at outside center with evidence of rejection, steatosis and concern for venous outflow.  He was noted to have concern for trace portal hypertension with HVPG of 12-13.  On admission he was noted to have a normal EF but did have moderate to severe tricuspid insufficiency and pulmonary hypertension.  His CT PE did not have concern for right heart strain.  Nodular regenerative hyperplasia can be seen in patients post liver transplant. The liver biopsy had stage II-III out of IV fibrosis.  - With concern for portal hypertension and need for SLK v SKT, will do EGD in am to evaluate for EV. NPO after 0700 on 7/3 for EGD in afternoon.   - Paracentesis on 6/27 without evidence of SBP. SAAG was elevated at 1.5 and total protein was also elevated at 4.3 concerning for cardiac involvement.   - Currently on tacrolimus 1 mg twice daily for immunosuppression.  Tacrolimus trough level 8.1  today. Goal trough level 4-6 given recent ACR on bx in May. Minimal elevation in AST, normal ALT and t. Bili. Dose decreased to 0.5 mg BID on 7/1.  - PETH test on 6/28 was 121. Pt denies any alcohol use. Will continue with periodic PETH testing.     # CKD  - on HD, removing fluid to achieve euvolemia.   - agree  with echo tomorrow to assess pulmonary hypertension.     Next follow up appointment: Dr. Simms 8/1/25    Thank you for the opportunity to be involved with  Blanco CENTENO Dylon care. Please call with any questions or concerns.    STEFFANY Phillips, CNP  Inpatient Hepatology [facility]              Subjective    Denies fevers, abdominal pain. Continues to need oxygen and denies coughs. Decreased appetite.         Objective    VS: BP 96/62 (BP Location: Right arm)   Pulse 70   Temp 97.4  F (36.3  C) (Oral)   Resp 16   Wt 94.2 kg (207 lb 11.2 oz)   SpO2 93%   BMI 28.17 kg/m     Date 06/30/25 0700 - 07/01/25 0659   Shift 5292-6465 3140-2609 1822-1553 24 Hour Total   INTAKE   P.O. 240   240   I.V. 176   176   Other 0   0   IV Piggyback 100   100   Shift Total(mL/kg) 516(5.41)   516(5.41)   OUTPUT   Other 2000 2000   Shift Total(mL/kg) 2000(20.99)   2000(20.99)   Weight (kg) 95.3 95.3 95.3 95.3      General: In no acute distress, no facial muscle wasting  Neuro: AOx3, No asterixis  HEENT:  Noscleral icterus,   CV: Skin warm and dry  Lungs:  Respirations even and nonlabored on nasal cannula  Abd:   Nondistended, nontender  Extrem: Noperipheral edema   Skin: Nojaundice  Psych: pleasant    MEDICATIONS:  Current Facility-Administered Medications   Medication Dose Route Frequency Provider Last Rate Last Admin    acetaminophen (TYLENOL) tablet 650 mg  650 mg Oral Q8H PRN Rashida Olivia MD   650 mg at 07/02/25 1604    aspirin (ASA) chewable tablet 81 mg  81 mg Oral Daily Rashida Olivia MD   81 mg at 07/02/25 0726    calcium carbonate (TUMS) chewable tablet 500 mg  500 mg Oral 4x Daily PRN Rashida Olivia MD        cefTRIAXone (ROCEPHIN) 2 g vial to attach to  ml bag for ADULTS or NS 50 ml bag for PEDS  2 g Intravenous Q24H Katia Garza   2 g at 07/02/25 1217    glucose gel 15-30 g  15-30 g Oral Q15 Min PRN Rashida Olivia MD        Or    dextrose 50 % injection 25-50 mL  25-50 mL Intravenous Q15 Min PRN Rashida Olivia,  MD        Or    glucagon injection 1 mg  1 mg Subcutaneous Q15 Min PRN Rashida Olivia MD        droxidopa (NORTHERA) capsule 100 mg  100 mg Oral TID Justyna Us   100 mg at 07/02/25 1335    gabapentin (NEURONTIN) capsule 300 mg  300 mg Oral At Bedtime PRN Rashida Olivia MD   300 mg at 06/30/25 1951    heparin 25,000 units in 0.45% NaCl 250 mL ANTICOAGULANT infusion  0-5,000 Units/hr Intravenous Continuous Katia Garza 17 mL/hr at 07/02/25 0146 1,700 Units/hr at 07/02/25 0146    lacosamide (VIMPAT) tablet 50 mg  50 mg Oral Daily Rashida Olivia MD   50 mg at 07/02/25 0726    melatonin tablet 5 mg  5 mg Oral At Bedtime PRN Darline Tuttle APRN CNP   5 mg at 06/30/25 2143    midodrine (PROAMATINE) tablet 10 mg  10 mg Oral Daily PRN Teressa Olivas PA   10 mg at 07/02/25 0958    midodrine (PROAMATINE) tablet 20 mg  20 mg Oral Q8H Mary Beth Duvall MD   20 mg at 07/02/25 1605    naloxone (NARCAN) injection 0.2 mg  0.2 mg Intravenous Q2 Min PRN Rainer Jones MD        Or    naloxone (NARCAN) injection 0.4 mg  0.4 mg Intravenous Q2 Min PRN Rainer Jones MD        Or    naloxone (NARCAN) injection 0.2 mg  0.2 mg Intramuscular Q2 Min PRN Rainer Jones MD        Or    naloxone (NARCAN) injection 0.4 mg  0.4 mg Intramuscular Q2 Min PRN Rainer Jones MD        ondansetron (ZOFRAN ODT) ODT tab 4 mg  4 mg Oral Q6H PRN Rashida Olivia MD   4 mg at 06/30/25 2143    Or    ondansetron (ZOFRAN) injection 4 mg  4 mg Intravenous Q6H PRN Rashida Olivia MD   4 mg at 07/02/25 0847    oxyCODONE (ROXICODONE) tablet 5 mg  5 mg Oral Q4H PRN Darline Tuttle APRN CNP   5 mg at 07/02/25 1003    pantoprazole (PROTONIX) EC tablet 40 mg  40 mg Oral BID Rashida Olivia MD   40 mg at 07/02/25 0726    polyethylene glycol (MIRALAX) Packet 17 g  17 g Oral Daily PRN Rashida Olivia MD        rOPINIRole (REQUIP) tablet 0.5 mg  0.5 mg Oral BID Rashida Olivia MD   0.5 mg at 07/02/25 0726     senna-docusate (SENOKOT-S/PERICOLACE) 8.6-50 MG per tablet 1 tablet  1 tablet Oral BID PRN Rashida Olivia MD        Or    senna-docusate (SENOKOT-S/PERICOLACE) 8.6-50 MG per tablet 2 tablet  2 tablet Oral BID PRN Rashida Olivia MD        tacrolimus (GENERIC EQUIVALENT) capsule 0.5 mg  0.5 mg Oral Q12H Jacklyn Liu APRN CNP   0.5 mg at 07/02/25 0726    warfarin ANTICOAGULANT (COUMADIN/JANTOVEN) tablet 6 mg  6 mg Oral ONCE at 18:00 Arpit Weber MD        Warfarin Dose Required Daily - Pharmacist Managed  1 each Oral See Admin Instructions Darline Hollis MD           REVIEW OF LABORATORY, PATHOLOGY AND IMAGING RESULTS:  BMP  Recent Labs   Lab 07/02/25  0554 07/01/25  0452 06/30/25  0634 06/29/25  0457   * 133* 136 137   POTASSIUM 4.0 3.7 3.9 3.9   CHLORIDE 94* 96* 97* 98   KELLY 9.1 8.7* 8.9 8.9   CO2 22 24 24 26   BUN 39.1* 25.8* 38.5* 27.4*   CR 5.50* 3.97* 6.33* 5.04*   GLC 96 93 95 130*     CBC  Recent Labs   Lab 07/02/25  0554 07/01/25  0452 06/30/25  1642 06/30/25  0910   WBC 5.0 2.9* 4.2 3.4*   RBC 2.41* 1.87* 2.04* 1.92*   HGB 8.0* 6.3* 7.0* 6.6*   HCT 26.6* 21.2* 23.4* 22.3*   * 113* 115* 116*   MCH 33.2* 33.7* 34.3* 34.4*   MCHC 30.1* 29.7* 29.9* 29.6*   RDW 20.8* 17.2* 17.5* 17.1*   PLT 90* 72* 85* 74*     INR  Recent Labs   Lab 07/02/25  0554 07/01/25  0452 06/30/25  0634 06/29/25  0457   INR 1.24* 1.28* 1.34* 1.37*     LFTs  Recent Labs   Lab 07/02/25  0554 07/01/25  0452 06/30/25  0634 06/29/25  0457   ALKPHOS 124 114 110 138   AST 22 24 28 40   ALT 8 10 11 15   BILITOTAL 0.5 0.5 0.6 0.7   PROTTOTAL 6.8 6.2* 6.2* 6.5   ALBUMIN 3.6 3.6 3.4* 3.7        MELD 3.0: 25 at 7/2/2025  5:54 AM  MELD-Na: 26 at 7/2/2025  5:54 AM  Calculated from:  Serum Creatinine: On dialysis. Using the maximum value.  Serum Sodium: 131 mmol/L at 7/2/2025  5:54 AM  Total Bilirubin: 0.5 mg/dL (Using min of 1 mg/dL) at 7/2/2025  5:54 AM  Serum Albumin: 3.6 g/dL (Using max of 3.5 g/dL) at 7/2/2025   5:54 AM  INR(ratio): 1.24 at 7/2/2025  5:54 AM  Age at listing (hypothetical): 60 years  Sex: Male at 7/2/2025  5:54 AM    Previous work-up:   Lab Results   Component Value Date    HEPBANG Nonreactive 06/22/2025    HBCAB Nonreactive 01/11/2024    AUSAB <3.50 06/22/2025    HCVAB Nonreactive 01/11/2024    HCVRNA Not Detected 12/16/2022    HOSSEIN 423 (H) 10/24/2023    IRONSAT 20 10/24/2023    TSH 1.74 06/27/2025    CHOL 136 03/18/2025    HDL 51 03/18/2025    LDL 63 03/18/2025    TRIG 109 03/18/2025    A1C 5.0 01/11/2024    POPETH 121 06/28/2025    PLPETH 64 06/28/2025      Lab Results   Component Value Date    SPECDES  01/11/2024     Blood: ACD      LDRESULTS  01/11/2024       Factor V 1691G>A ([place])  RESULTS:  Mutation analyzed: 1691G>A  Factor V 1691G>A ([place])  Interpretation:  ABSENT  Factor V 1691G>A ([place]) mutation  genotype:  G/G    FACTOR 2/PROTHROMBIN RESULTS:  Mutation analyzed: [id number]  Factor 2 Mutation Interpretation:  ABSENT  Factor 2 Mutation genotype:  G/G             Imaging Results:  No new imaging to review.

## 2025-07-02 NOTE — PROGRESS NOTES
[facility]    Progress Note - Medicine Service, MARDALE TEAM 4       Date of Admission:  6/27/2025    Assessment & Plan   Blanco Osborne is a 60 year old male admitted on 6/27/2025. 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 He was admitted to ICU for pressors requirements (septic vs. Hypovolemic shock) found to have PE now on heparin gtt while bridging to warfarin.      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 Per review, HD is difficult for him to handle given hypotension- requires midodrine with HD. Currently being evaluated at [facility] for kidney transplant (though key question, given poor hepatic function, is whether he should proceed with kidney transplant alone). Based on his picture and this ICU stay, it appears his transplant will need to occur more urgently as he has high likelihood of readmission to the ICU. Dry weight list as 89.5kg.   - Nephrology consulted   - Midodrine 20mg TID  -Droxydopa TID     # AHRF  # Pulmonary Embolism  #Edema with pleural effusions   #Pneumonia   Likely multifactorial in setting of presumed pneumonia, edema and new pulmonary embolism.  He was started on a heparin gtt.  His oxygen requirements have been weaning down. Given plan for possible kidney transplant will hold off on DOAC switch and restart warfarin. Will bridge with heparin.   -Pharmacy consult for warfarin, first dose 7/1  -Bridge with heparin, will need to remain inpatient until therapeutic  -Ceftriaxone 6/30-  Plan for 7 total days   - Follow cultures NGTD      #Pancytopenia   # Chronic macrocytic anemia   On 6/30 developed pancytopenia. Unclear etiology at this point. Medication induced, versus dilutional with volume. HIIT screem negative. Zosyn could also cause acute drop in platelets.   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 He has had ascites now several years requiring intermittent paracenteses. Per report, pt has a number of features consistent with clinically significant portal hypertension. He had liver biopsy in our system 4/09/2025 showed severe hepatitis with many plasma cells, activity grade 3-4 /4; likely mild fibrosis. Hepatology suspects that NRH is contributing factor to his underlying portal hypertension- this along with his ESRD and difficulty pulling fluid during dialysis all contributing to his ascites.  - Hepatology consulted, appreciate recs  - Continue PTA tacrolimus: 1mg BID               - tacro level in AM  - SBP management: on ceftriaxone   - Has hx SBP: last culture data visible is in 2022, lactobacillus, notably not on prophylactic cipro -- likely will need in future  - ethyl alcohol level (while no history of active use, some notes point out alcohol use post transplant. AST 3x the level of ALT as well, plus evidence of fibrosis and hepatitis on liver biopsy) -- PETH with moderate use     --- Chronic Medical Problems ----     # Tricuspid Insufficiency  # Hx of pericardial effusion 2023, s/p pericardiocentesis x 2 in 9/23 + 10/23   # CAD  New on echocardiogram 6/27/25, as compared to previous study 1/11/24. Appears functional, RV with normal size and  "normal function (borderline reduced). Moderate to severe tricuspid regurge possible 2/2 volume overload.  - Pending kidney transplant workup may need cardiology consult    - PTA ASA 81      #Hx CVA  - PTA ASA 81mg       #RLS  - PTA ropinirole 0.5mg BID     #Hx seizures  - Continue lacosamide 50mg daily (of note, Jacobo note states that pt takes every other day as he is being \"weaned off\" by his neurologist\". Per 10/24 note review, neurology advised pt to stay on this medication).   - Lacosamide level at goal      #CHRISTAIN  Unclear if uses CPAP at home.      #GERD  - PTA PPI     #Hypotension   #Hx chronic hypotension  Pt has ongoing concerns with chronic hypotension: typically takes midodrine on dialysis days and sometime he takes extra doses as needed for symptoms. His average systolic BP is 80s-90s and he can feel asymptomatic with BP in the 80s.  He has been difficult to dialyze due to this chronic hypotension.  After discussion with family it appears he had not been taking his midodrine totally as prescribed.  He was weaned off nor epi and midodrine was increased.  Suspect majority of his presentation is chronic in nature.  Low concern for shock picture   - Continue midodrine 20 mg every 8 hours             Diet: Renal Diet (dialysis)  Fluid restriction 1200 ML FLUID  Snacks/Supplements Adult: Nepro Oral Supplement; With Meals    DVT Prophylaxis: Warfarin  Nixon Catheter: Not present  Fluids: None  Lines: PRESENT      CVC Double Lumen Right Internal jugular Tunneled-Site Assessment: WDL      Cardiac Monitoring: None  Code Status: Full Code      Clinically Significant Risk Factors         # Hyponatremia: Lowest Na = 133 mmol/L in last 2 days, will monitor as appropriate  # Hypochloremia: Lowest Cl = 96 mmol/L in last 2 days, will monitor as appropriate      # Hypoalbuminemia: Lowest albumin = 3.4 g/dL at 6/30/2025  6:34 AM, will monitor as appropriate   # Thrombocytopenia: Lowest platelets = 68 in last 2 days, will " monitor for bleeding   # Hypertension: Noted on problem list            # Overweight: Estimated body mass index is 28.44 kg/m  as calculated from the following:    Height as of 6/24/25: 1.829 m (6').    Weight as of this encounter: 95.1 kg (209 lb 11.2 oz).      # Financial/Environmental Concerns: none         Social Drivers of Health   Alcohol Use: Unknown (10/7/2019)    AUDIT-C     Frequency of Alcohol Consumption: Monthly or less   Physical Activity: Insufficiently Active (4/18/2025)    Received from [facility]    Exercise Vital Sign     Days of Exercise per Week: 2 days     Minutes of Exercise per Session: 10 min         Disposition Plan   Medically Ready for Discharge: Anticipated in 5+ Days  One bridge to warfarin completed       The patient's care was discussed with the Attending Physician, Dr. Weber.    Darline Hollis MD  Medicine Service, [address]  Securely message with [employer] (more info)  Text page via [employer] Paging/Directory   See signed in provider for up to date coverage information  ______________________________________________________________________    Interval History   NAEO. Received blood transfusion this morming. Underwent dialysis, removed 3L. Hypotension later in the day, patient was sleeping and asymptomatic at this time, vitals otherwise stable, AMS intact.     Physical Exam   Vital Signs: Temp: 97.7  F (36.5  C) Temp src: Oral BP: (!) 86/54 (recheck, RN notified) Pulse: 60   Resp: 14 SpO2: 100 % O2 Device: Nasal cannula Oxygen Delivery: 1 LPM  Weight: 209 lbs 11.2 oz    General: awake, alert, no distress  Neuro: Awake, A&Ox3,  Pulm/Resp: On 1L NC, lying in bed, Breathing comfortably  CV: RRR  Abdomen: soft, distended, nontender  MSK Bruising to the LUE (site of fistula recannulation), edges marked with pen. Ecchymosis present   No edema extremities     Medical Decision Making             Data     I have personally reviewed the  following data over the past 24 hrs:    2.9 (L)  \   6.3 (LL)   / 72 (L)     133 (L) 96 (L) 25.8 (H) /  93   3.7 24 3.97 (H) \     ALT: 10 AST: 24 AP: 114 TBILI: 0.5   ALB: 3.6 TOT PROTEIN: 6.2 (L) LIPASE: N/A     INR:  1.28 (H) PTT:  N/A   D-dimer:  N/A Fibrinogen:  N/A

## 2025-07-02 NOTE — PROGRESS NOTES
Nephrology Progress Note  07/02/2025         Assessment & Recommendations:   Blanco Osborne is a 59 yo male with Pmhx ESRD (on HD MWF since 1/23), NAFLD s/p liver transplant (9/2016), PEA arrest 2/2 hypoxic respiratory failure (2022), pAF, CVA, HLD, GERD, CHRISTIAN on CPAP, RLS, and hx seizures. Presented to [facility] ED for hypoxia and AMS. Appeared to be volume overloaded on ED exam, subsequently admitted to [facility] 6/26 for HD. However after HD pt was persistently hypotensive despite midodrine, therefore started on pressors and transferred to [facility] given bed availability. Treating for shock 2/2 presumed infection, also found to have PE.     #ESKD: dialyzes MWF at [facility] with Dr. Bradshaw. Access: tunneled RIJ (clotted L AVF s/p revision with Dr Mcneil 6/24/25, not to be used for 1 month). Run time: 3.5 hrs. TW 89.5 kg. Active on tx list here and is also being evaluated at [facility] for SLK  - dialysis per MWF schedule with extra UF only runs until TW is achieved     #BP/Volume: anuric, TW 89.5 kg   - hx of intradialytic hypotension   - evidence of volume overload on CXR and echo; and hepatology indicates likely congestive hepatopathy  - echo 6/27 with EF 60-65%, flattened septum c/w R ventricular pressure and volume overload (also in setting of new PE), IVC diameter and resp changes in intermediate range; no pericardial effusion  - s/p 3 L paracentesis on 6/27    -  on midodrine 20 mg TID  - ordered prn midodrine 10 mg for use during HD; also using albumin  - ok for SBP > 85 if asymptomatic on dialysis  - pre HD standing weight 94.2 kg (dry weight 89.5 kg)  - UF 3L goal today  - continue 1200 ml fluid restriction        #Pancytopenia:  #Anemia of CKD: on mircera 30 mcg [id number]  --Has had some blood loss related to leaking around CVC in setting of thromboyctopenia, hgb 8.0 today s/p transfusion 7/1  - ordered epogen 4000 units per HD  - transfusion per primary team     #BMD: Ca 8-9's, alb 3.6, phos  "4.7     #ID: Initially on vanc/zosyn now only on ceftriaxone for possible HAP     #Afib: has been on DOAC vs warfarin on and off  #New PE: on heparin and now started on warfarin  - CT with \"PE in posterior segmental and subsegmental arterial branches in the left upper lobe. No evidence of right heart strain.\"    JEANCARLOS Fowler   Division of Nephrology and Hypertension  Vocera Web Console    Recommendations communicated to primary team via this note    Interval History :   Seen on dialysis, UF 3L yesterday, attempting the same today, still will be a few kg over TW so plan UF run tomorrow as well. No n/v, CP, SOB, chills    Review of Systems:   4 point ROS neg other than as noted above    Physical Exam:   I/O last 3 completed shifts:  In: 900 [P.O.:600]  Out: 3000 [Other:3000]   BP (!) 86/53   Pulse 70   Temp 97.4  F (36.3  C) (Oral)   Resp 16   Wt 94.2 kg (207 lb 11.2 oz)   SpO2 99%   BMI 28.17 kg/m       GENERAL APPEARANCE: NAD  EYES: no scleral icterus, pupils equal  Pulmonary: CTA  CV: RRR   - Edema trace LE, slightly distended abdomen  GI: soft  MS: no evidence of inflammation in joints, no muscle tenderness  : no gomez  SKIN: no rash  NEURO: face symmetric, a/o3  Access: tunneled RIJ (newly revised L AVF)    Labs:   All labs reviewed by me  Electrolytes/Renal -   Recent Labs   Lab Test 07/02/25  0554 07/01/25  0452 06/30/25  0634   * 133* 136   POTASSIUM 4.0 3.7 3.9   CHLORIDE 94* 96* 97*   CO2 22 24 24   BUN 39.1* 25.8* 38.5*   CR 5.50* 3.97* 6.33*   GLC 96 93 95   KELLY 9.1 8.7* 8.9   MAG 1.8 1.7 1.8   PHOS 4.7* 3.8 4.8*       CBC -   Recent Labs   Lab Test 07/02/25  0554 07/01/25  0452 06/30/25  1642   WBC 5.0 2.9* 4.2   HGB 8.0* 6.3* 7.0*   PLT 90* 72* 85*       LFTs -   Recent Labs   Lab Test 07/02/25  0554 07/01/25  0452 06/30/25  0634   ALKPHOS 124 114 110   BILITOTAL 0.5 0.5 0.6   ALT 8 10 11   AST 22 24 28   PROTTOTAL 6.8 6.2* 6.2*   ALBUMIN 3.6 3.6 3.4*       Iron Panel -   Recent " Labs   Lab Test 10/24/23  0537 04/14/23  0725 04/06/23  0808 03/25/23  1058 03/12/23  0756   IRON 38* 60* 78  --  37*   IRONSAT 20 29  --   --  26   HOSSEIN 423* 286  --    < > 252    < > = values in this interval not displayed.         Imaging:  All imaging reviewed by me.     Current Medications:  Current Facility-Administered Medications   Medication Dose Route Frequency Provider Last Rate Last Admin    aspirin (ASA) chewable tablet 81 mg  81 mg Oral Daily Rashida Olivia MD   81 mg at 07/02/25 0726    cefTRIAXone (ROCEPHIN) 2 g vial to attach to  ml bag for ADULTS or NS 50 ml bag for PEDS  2 g Intravenous Q24H Katia Garza   2 g at 07/01/25 1042    droxidopa (NORTHERA) capsule 100 mg  100 mg Oral TID Justyna Us   100 mg at 07/01/25 1953    epoetin mirta-epbx (RETACRIT) injection 4,000 Units  4,000 Units Intravenous Once in dialysis/CRRT Arpit Weber MD        sodium chloride 0.9% DIALYSIS Cath LOCK - RED Lumen  10 mL Intracatheter Once in dialysis/CRRT Teressa Olivas PA        Followed by    heparin 1000 unit/mL DIALYSIS Cath LOCK - RED Lumen  1.3-2.6 mL Intracatheter Once in dialysis/CRRT [facility] [place] [place]        sodium chloride 0.9% DIALYSIS Cath LOCK - BLUE Lumen  10 mL Intracatheter Once in dialysis/CRRT Teressa Olivas PA        Followed by    heparin 1000 unit/mL DIALYSIS Cath LOCK -BLUE Lumen  1.3-2.6 mL Intracatheter Once in dialysis/CRRT Teressa Olivas PA        lacosamide (VIMPAT) tablet 50 mg  50 mg Oral Daily Rashida Olivia MD   50 mg at 07/02/25 0726    midodrine (PROAMATINE) tablet 20 mg  20 mg Oral Q8H Mary Beth Duvall MD   20 mg at 07/02/25 0726    pantoprazole (PROTONIX) EC tablet 40 mg  40 mg Oral BID Rashida Olivia MD   40 mg at 07/02/25 0726    rOPINIRole (REQUIP) tablet 0.5 mg  0.5 mg Oral BID Rashida Olivia MD   0.5 mg at 07/02/25 8443    sodium chloride (PF) 0.9% PF flush 9 mL  9 mL Intracatheter During Dialysis/CRRT (from stock) Teressa Olivas,  PA        sodium chloride (PF) 0.9% PF flush 9 mL  9 mL Intracatheter During Dialysis/CRRT (from stock) Teressa Olivas PA        tacrolimus (GENERIC EQUIVALENT) capsule 0.5 mg  0.5 mg Oral Q12H Jacklyn Liu APRN CNP   0.5 mg at 07/02/25 0726    warfarin ANTICOAGULANT (COUMADIN/JANTOVEN) tablet 6 mg  6 mg Oral ONCE at 18:00 Arpit Weber MD        Warfarin Dose Required Daily - Pharmacist Managed  1 each Oral See Admin Instructions Darline Hollis MD         Current Facility-Administered Medications   Medication Dose Route Frequency Provider Last Rate Last Admin    heparin 25,000 units in 0.45% NaCl 250 mL ANTICOAGULANT infusion  0-5,000 Units/hr Intravenous Continuous Katia Garza 17 mL/hr at 07/02/25 0146 1,700 Units/hr at 07/02/25 0146     JEANCARLOS Fowler

## 2025-07-02 NOTE — PROGRESS NOTES
[facility]    Progress Note - Medicine Service, MANUEL TEAM 4       Date of Admission:  6/27/2025    Assessment & Plan   Blanco Osborne is a 60 year old male admitted on 6/27/2025. 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 He was admitted to ICU for pressors requirements (septic vs. Hypovolemic shock) found to have PE now on heparin gtt while bridging to warfarin. Has non-cirrhotic portal hypertension secondary to regenerative liver hyperplasia s/p transplant, plan to undergo EGD with GI team to evaluate for esophageal varices tomorrow.       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 Per review, HD is difficult for him to handle given hypotension- requires midodrine with HD. Currently being evaluated at [facility] for kidney transplant (though key question, given poor hepatic function, is whether he should proceed with kidney transplant alone). Based on his picture and this ICU stay, it appears his transplant will need to occur more urgently as he has high likelihood of readmission to the ICU. Dry weight list as 89.5kg. He typically gets MWF HD< has been daily this week in an attempt to get to dry weight, suspect will be euvolemic 7/3.   - Nephrology consulted, HD planned next for 7/3  - Midodrine 20mg TID  - Droxidopa TID     # AHRF  # Pulmonary Embolism  #Edema with pleural effusions   #Pneumonia   Likely multifactorial in setting of presumed pneumonia, edema and new pulmonary embolism.  He was started  on a heparin gtt.  His oxygen requirements have been weaning down. Given plan for possible kidney transplant will  restart warfarin. Will bridge with heparin.   -Bridge with heparin, will need to remain inpatient until therapeutic  -Ceftriaxone 6/30- Plan for 7 total days   - Follow cultures NGTD      #Pancytopenia   # Chronic macrocytic anemia   On 6/30 developed pancytopenia. Unclear etiology at this point. Medication induced, versus dilutional with volume. HIIT screem negative. Zosyn could also cause acute drop in platelets.   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 He has had ascites now several years requiring intermittent paracenteses. Per report, pt has a number of features consistent with clinically significant portal hypertension. He had liver biopsy in our system 4/09/2025 showed severe hepatitis with many plasma cells, activity grade 3-4 /4; likely mild fibrosis. Hepatology suspects that NRH is contributing factor to his underlying portal hypertension- this along with his ESRD and difficulty pulling fluid during dialysis all contributing to his ascites.GI planning to evaluated for EV with EGD on 7/3.   -EGD with GI to evaluate EV 7/3   -NPO at 0700am  - Hepatology consulted, appreciate recs  - Continue PTA tacrolimus: 1mg BID               - tacro level in AM  - SBP management: on ceftriaxone   - Has hx SBP: last culture data visible is in 2022, lactobacillus, notably not on prophylactic cipro -- likely will need in future  - ethyl alcohol level (while no history of active use, some notes point out alcohol use post transplant. AST 3x the level of ALT as well, plus  "evidence of fibrosis and hepatitis on liver biopsy) -- PETH with moderate use     --- Chronic Medical Problems ----     # Tricuspid Insufficiency  #Pulmonary HTN  New on echocardiogram 6/27/25, as compared to previous study 1/11/24. Appears functional, RV with normal size and normal function (borderline reduced). Moderate to severe tricuspid regurge possible 2/2 volume overload.  - Repeat echo once euvolemic   - PTA ASA 81      #Hx CVA  - PTA ASA 81mg       #RLS  - PTA ropinirole 0.5mg BID     #Hx seizures  - Continue lacosamide 50mg daily (of note, [facility]o note states that pt takes every other day as he is being \"weaned off\" by his neurologist\". Per 10/24 note review, neurology advised pt to stay on this medication).   - Lacosamide level at goal      #CHRISTIAN  Unclear if uses CPAP at home.      #GERD  - PTA PPI     #Hypotension   #Hx chronic hypotension  Pt has ongoing concerns with chronic hypotension: typically takes midodrine on dialysis days and sometime he takes extra doses as needed for symptoms. His average systolic BP is 80s-90s and he can feel asymptomatic with BP in the 80s.  He has been difficult to dialyze due to this chronic hypotension.  After discussion with family it appears he had not been taking his midodrine totally as prescribed.  He was weaned off nor epi and midodrine was increased.  Suspect majority of his presentation is chronic in nature.  Low concern for shock picture   - Continue midodrine 20 mg every 8 hours            Diet: Renal Diet (dialysis)  Fluid restriction 1200 ML FLUID  Snacks/Supplements Adult: Nepro Oral Supplement; With Meals  NPO for Procedure/Surgery per Anesthesia Guidelines Except for: Meds; Clear liquids before procedure/surgery: ADULT (Age GREATER than or Equal to 18 years) - Clear liquids 2 hours before procedure/surgery    DVT Prophylaxis: Warfarin with heparin bridge  Nixon Catheter: Not present  Fluids: none  Lines: PRESENT      CVC Double Lumen Right Internal " jugular Tunneled-Site Assessment: WDL      Cardiac Monitoring: None  Code Status: Full Code      Clinically Significant Risk Factors         # Hyponatremia: Lowest Na = 131 mmol/L in last 2 days, will monitor as appropriate  # Hypochloremia: Lowest Cl = 94 mmol/L in last 2 days, will monitor as appropriate      # Hypoalbuminemia: Lowest albumin = 3.4 g/dL at 6/30/2025  6:34 AM, will monitor as appropriate   # Thrombocytopenia: Lowest platelets = 72 in last 2 days, will monitor for bleeding   # Hypertension: Noted on problem list            # Overweight: Estimated body mass index is 28.17 kg/m  as calculated from the following:    Height as of 6/24/25: 1.829 m (6').    Weight as of this encounter: 94.2 kg (207 lb 11.2 oz).      # Financial/Environmental Concerns: none         Social Drivers of Health   Alcohol Use: Unknown (10/7/2019)    AUDIT-C     Frequency of Alcohol Consumption: Monthly or less   Physical Activity: Insufficiently Active (4/18/2025)    Received from [facility]    Exercise Vital Sign     Days of Exercise per Week: 2 days     Minutes of Exercise per Session: 10 min         Disposition Plan     Medically Ready for Discharge: Anticipated in 2-4 Days Once warfin therapeutic       The patient's care was discussed with the Attending Physician, Dr. Weber.    Darline Hollis MD  Medicine Service, [address]  Securely message with [employer] (more info)  Text page via [facility] Paging/Directory   See signed in provider for up to date coverage information  ______________________________________________________________________    Interval History   No acute events overnight. Had softer blood pressures and lightheadedness prompting overnight midodrine dose to be given earlier. Patient had dialysis today, 2.5L removed. He is feeling ok today, continues to have significant pain in his left arm. Feels as though his abdomen is more distended and wondering if has  more ascites.    Physical Exam   Vital Signs: Temp: 97.4  F (36.3  C) Temp src: Oral BP: (!) 83/49 Pulse: 70   Resp: 20 SpO2: 95 % O2 Device: None (Room air) Oxygen Delivery: 1 LPM  Weight: 207 lbs 11.2 oz    General: awake, alert, no distress during dialysis  Neuro: Awake, A&Ox3,  Pulm/Resp: On NC lying in bed, Breathing comfortably, lungs clear bilat  CV: RRR, no murmur, well perfused extremities, trace edema  Abdomen: soft, distended, nontender, mild fluid shift  Skin: Bruising to the LUE (site of fistula recannulation), edges marked with pen. Ecchymosis present       Medical Decision Making             Data     I have personally reviewed the following data over the past 24 hrs:    5.0  \   8.0 (L)   / 90 (L)     131 (L) 94 (L) 39.1 (H) /  96   4.0 22 5.50 (H) \     ALT: 8 AST: 22 AP: 124 TBILI: 0.5   ALB: 3.6 TOT PROTEIN: 6.8 LIPASE: N/A     Procal: N/A CRP: N/A Lactic Acid: 0.5 (L)       INR:  1.24 (H) PTT:  N/A   D-dimer:  N/A Fibrinogen:  N/A

## 2025-07-02 NOTE — PROGRESS NOTES
Care Management Follow Up    Length of Stay (days): 5    Expected Discharge Date: 07/04/2025     Concerns to be Addressed: discharge planning, care coordination/care conferences, decision making     Patient plan of care discussed at interdisciplinary rounds: Yes    Anticipated Discharge Disposition:     Home           Anticipated Discharge Services:  TBD  Anticipated Discharge DME:  TBD    Patient/family educated on Medicare website which has current facility and service quality ratings:  N/A  Education Provided on the Discharge Plan: yes   Patient/Family in Agreement with the Plan:  TBD    Referrals Placed by CM/SW:  TBD  Private pay costs discussed: Not applicable    Discussed  Partnership in Safe Discharge Planning  document with patient/family: No     Handoff Completed: No, handoff not indicated or clinically appropriate    Additional Information:  Patient transferred to  for ongoing medical management. Pt admitted to ICU with septic vs hypovolemic shock found to have a PE.  Pt currently on heparin gtt.  Pt currently requiring IV antibiotics.  Prior to hospitalization pt was on HD and chronic coumadin.  Per chart review noted pt coumadin management by [facility] Anticoagulation clinic.  Anticipate pt will remain inpatient until INR therapeutic.     Outpatient Dialysis  [address] [address]    [phone]  Fax    Monday, Wednesday, Friday    Next Steps:   Care Management following for discharge planning  Update OP HD unit when pt medically cleared for discharge and ensure final discharge orders/summary faxed.    Elodia Antunez, RN BSN, PHN, ACM-RN  7A Nurse Care Coordinator    [facility] Acute Care Management  Phone: [phone]  Available on Vocbertha ESPOSITO RNCC

## 2025-07-03 VITALS
WEIGHT: 203.71 LBS | BODY MASS INDEX: 27.63 KG/M2 | RESPIRATION RATE: 15 BRPM | DIASTOLIC BLOOD PRESSURE: 60 MMHG | HEART RATE: 59 BPM | OXYGEN SATURATION: 100 % | TEMPERATURE: 98 F | SYSTOLIC BLOOD PRESSURE: 91 MMHG

## 2025-07-03 LAB
ALBUMIN SERPL BCG-MCNC: 4.1 G/DL (ref 3.5–5.2)
ALP SERPL-CCNC: 132 U/L (ref 40–150)
ALT SERPL W P-5'-P-CCNC: 8 U/L (ref 0–70)
ANION GAP SERPL CALCULATED.3IONS-SCNC: 17 MMOL/L (ref 7–15)
AST SERPL W P-5'-P-CCNC: 20 U/L (ref 0–45)
BILIRUB SERPL-MCNC: 0.6 MG/DL
BUN SERPL-MCNC: 25.4 MG/DL (ref 8–23)
CALCIUM SERPL-MCNC: 9.4 MG/DL (ref 8.8–10.4)
CHLORIDE SERPL-SCNC: 96 MMOL/L (ref 98–107)
CREAT SERPL-MCNC: 4.05 MG/DL (ref 0.67–1.17)
EGFRCR SERPLBLD CKD-EPI 2021: 16 ML/MIN/1.73M2
ERYTHROCYTE [DISTWIDTH] IN BLOOD BY AUTOMATED COUNT: 21 % (ref 10–15)
GLUCOSE SERPL-MCNC: 100 MG/DL (ref 70–99)
HCO3 SERPL-SCNC: 22 MMOL/L (ref 22–29)
HCT VFR BLD AUTO: 29.1 % (ref 40–53)
HGB BLD-MCNC: 8.8 G/DL (ref 13.3–17.7)
INR PPP: 1.23 (ref 0.85–1.15)
MAGNESIUM SERPL-MCNC: 1.8 MG/DL (ref 1.7–2.3)
MCH RBC QN AUTO: 33 PG (ref 26.5–33)
MCHC RBC AUTO-ENTMCNC: 30.2 G/DL (ref 31.5–36.5)
MCV RBC AUTO: 109 FL (ref 78–100)
PHOSPHATE SERPL-MCNC: 3.2 MG/DL (ref 2.5–4.5)
PLATELET # BLD AUTO: 112 10E3/UL (ref 150–450)
POTASSIUM SERPL-SCNC: 3.8 MMOL/L (ref 3.4–5.3)
PROT SERPL-MCNC: 7.3 G/DL (ref 6.4–8.3)
PROTHROMBIN TIME: 15.4 SECONDS (ref 11.8–14.8)
RBC # BLD AUTO: 2.67 10E6/UL (ref 4.4–5.9)
SODIUM SERPL-SCNC: 135 MMOL/L (ref 135–145)
TACROLIMUS BLD-MCNC: 8 UG/L (ref 5–15)
TME LAST DOSE: NORMAL H
TME LAST DOSE: NORMAL H
UFH PPP CHRO-ACNC: 0.42 IU/ML (ref ?–1.1)
UPPER GI ENDOSCOPY: NORMAL
WBC # BLD AUTO: 5.3 10E3/UL (ref 4–11)

## 2025-07-03 PROCEDURE — 999N000099 HC STATISTIC MODERATE SEDATION < 10 MIN: Performed by: INTERNAL MEDICINE

## 2025-07-03 PROCEDURE — 36415 COLL VENOUS BLD VENIPUNCTURE: CPT | Performed by: NURSE PRACTITIONER

## 2025-07-03 PROCEDURE — 85520 HEPARIN ASSAY: CPT | Performed by: STUDENT IN AN ORGANIZED HEALTH CARE EDUCATION/TRAINING PROGRAM

## 2025-07-03 PROCEDURE — 84100 ASSAY OF PHOSPHORUS: CPT

## 2025-07-03 PROCEDURE — 250N000013 HC RX MED GY IP 250 OP 250 PS 637

## 2025-07-03 PROCEDURE — 90935 HEMODIALYSIS ONE EVALUATION: CPT

## 2025-07-03 PROCEDURE — P9047 ALBUMIN (HUMAN), 25%, 50ML: HCPCS | Performed by: PHYSICIAN ASSISTANT

## 2025-07-03 PROCEDURE — 84155 ASSAY OF PROTEIN SERUM: CPT

## 2025-07-03 PROCEDURE — 250N000013 HC RX MED GY IP 250 OP 250 PS 637: Performed by: STUDENT IN AN ORGANIZED HEALTH CARE EDUCATION/TRAINING PROGRAM

## 2025-07-03 PROCEDURE — 0DJ08ZZ INSPECTION OF UPPER INTESTINAL TRACT, VIA NATURAL OR ARTIFICIAL OPENING ENDOSCOPIC: ICD-10-PCS | Performed by: INTERNAL MEDICINE

## 2025-07-03 PROCEDURE — 99233 SBSQ HOSP IP/OBS HIGH 50: CPT | Mod: GC | Performed by: STUDENT IN AN ORGANIZED HEALTH CARE EDUCATION/TRAINING PROGRAM

## 2025-07-03 PROCEDURE — 250N000013 HC RX MED GY IP 250 OP 250 PS 637: Performed by: PHYSICIAN ASSISTANT

## 2025-07-03 PROCEDURE — 250N000011 HC RX IP 250 OP 636: Performed by: INTERNAL MEDICINE

## 2025-07-03 PROCEDURE — 250N000012 HC RX MED GY IP 250 OP 636 PS 637: Performed by: NURSE PRACTITIONER

## 2025-07-03 PROCEDURE — 80197 ASSAY OF TACROLIMUS: CPT | Performed by: NURSE PRACTITIONER

## 2025-07-03 PROCEDURE — 258N000003 HC RX IP 258 OP 636: Performed by: PHYSICIAN ASSISTANT

## 2025-07-03 PROCEDURE — 120N000011 HC R&B TRANSPLANT UMMC

## 2025-07-03 PROCEDURE — 83735 ASSAY OF MAGNESIUM: CPT

## 2025-07-03 PROCEDURE — 99233 SBSQ HOSP IP/OBS HIGH 50: CPT | Performed by: PHYSICIAN ASSISTANT

## 2025-07-03 PROCEDURE — 43235 EGD DIAGNOSTIC BRUSH WASH: CPT | Performed by: INTERNAL MEDICINE

## 2025-07-03 PROCEDURE — 250N000011 HC RX IP 250 OP 636: Performed by: PHYSICIAN ASSISTANT

## 2025-07-03 PROCEDURE — 250N000012 HC RX MED GY IP 250 OP 636 PS 637: Performed by: STUDENT IN AN ORGANIZED HEALTH CARE EDUCATION/TRAINING PROGRAM

## 2025-07-03 PROCEDURE — 250N000011 HC RX IP 250 OP 636

## 2025-07-03 PROCEDURE — 85610 PROTHROMBIN TIME: CPT

## 2025-07-03 PROCEDURE — 85014 HEMATOCRIT: CPT

## 2025-07-03 RX ORDER — TACROLIMUS 0.5 MG/1
0.5 CAPSULE ORAL
Status: DISPENSED | OUTPATIENT
Start: 2025-07-03

## 2025-07-03 RX ORDER — ALBUMIN (HUMAN) 12.5 G/50ML
50 SOLUTION INTRAVENOUS
OUTPATIENT
Start: 2025-07-04

## 2025-07-03 RX ORDER — FENTANYL CITRATE 50 UG/ML
INJECTION, SOLUTION INTRAMUSCULAR; INTRAVENOUS PRN
OUTPATIENT
Start: 2025-07-03

## 2025-07-03 RX ORDER — ALBUMIN (HUMAN) 12.5 G/50ML
50 SOLUTION INTRAVENOUS
Status: DISCONTINUED | OUTPATIENT
Start: 2025-07-03 | End: 2025-07-03

## 2025-07-03 RX ORDER — WARFARIN SODIUM 4 MG/1
8 TABLET ORAL
Status: COMPLETED | OUTPATIENT
Start: 2025-07-03 | End: 2025-07-03

## 2025-07-03 RX ADMIN — TACROLIMUS 0.5 MG: 0.5 CAPSULE ORAL at 17:20

## 2025-07-03 RX ADMIN — ROPINIROLE HYDROCHLORIDE 0.5 MG: 0.5 TABLET, FILM COATED ORAL at 10:46

## 2025-07-03 RX ADMIN — SODIUM CHLORIDE 250 ML: 0.9 INJECTION, SOLUTION INTRAVENOUS at 07:33

## 2025-07-03 RX ADMIN — HEPARIN SODIUM 1900 UNITS: 1000 INJECTION, SOLUTION INTRAVENOUS; SUBCUTANEOUS at 09:22

## 2025-07-03 RX ADMIN — DROXIDOPA 100 MG: 100 CAPSULE ORAL at 13:21

## 2025-07-03 RX ADMIN — OXYCODONE HYDROCHLORIDE 5 MG: 5 TABLET ORAL at 05:57

## 2025-07-03 RX ADMIN — ALBUMIN HUMAN 50 ML: 0.25 SOLUTION INTRAVENOUS at 08:16

## 2025-07-03 RX ADMIN — PANTOPRAZOLE SODIUM 40 MG: 40 TABLET, DELAYED RELEASE ORAL at 20:28

## 2025-07-03 RX ADMIN — ASPIRIN 81 MG CHEWABLE TABLET 81 MG: 81 TABLET CHEWABLE at 10:45

## 2025-07-03 RX ADMIN — LACOSAMIDE 50 MG: 50 TABLET, FILM COATED ORAL at 10:46

## 2025-07-03 RX ADMIN — HEPARIN SODIUM 1900 UNITS: 1000 INJECTION, SOLUTION INTRAVENOUS; SUBCUTANEOUS at 09:21

## 2025-07-03 RX ADMIN — DROXIDOPA 100 MG: 100 CAPSULE ORAL at 06:33

## 2025-07-03 RX ADMIN — MIDODRINE HYDROCHLORIDE 20 MG: 5 TABLET ORAL at 16:21

## 2025-07-03 RX ADMIN — PANTOPRAZOLE SODIUM 40 MG: 40 TABLET, DELAYED RELEASE ORAL at 10:46

## 2025-07-03 RX ADMIN — ACETAMINOPHEN 650 MG: 325 TABLET ORAL at 01:31

## 2025-07-03 RX ADMIN — OXYCODONE HYDROCHLORIDE 5 MG: 5 TABLET ORAL at 13:21

## 2025-07-03 RX ADMIN — ROPINIROLE HYDROCHLORIDE 0.5 MG: 0.5 TABLET, FILM COATED ORAL at 20:28

## 2025-07-03 RX ADMIN — DROXIDOPA 100 MG: 100 CAPSULE ORAL at 20:28

## 2025-07-03 RX ADMIN — SODIUM CHLORIDE 200 ML: 0.9 INJECTION, SOLUTION INTRAVENOUS at 07:33

## 2025-07-03 RX ADMIN — OXYCODONE HYDROCHLORIDE 5 MG: 5 TABLET ORAL at 01:32

## 2025-07-03 RX ADMIN — MIDODRINE HYDROCHLORIDE 10 MG: 5 TABLET ORAL at 09:20

## 2025-07-03 RX ADMIN — CEFTRIAXONE SODIUM 2 G: 2 INJECTION, POWDER, FOR SOLUTION INTRAMUSCULAR; INTRAVENOUS at 10:46

## 2025-07-03 RX ADMIN — MIDODRINE HYDROCHLORIDE 20 MG: 5 TABLET ORAL at 06:32

## 2025-07-03 RX ADMIN — HEPARIN SODIUM 1700 UNITS/HR: 10000 INJECTION, SOLUTION INTRAVENOUS at 05:53

## 2025-07-03 RX ADMIN — TACROLIMUS 0.5 MG: 0.5 CAPSULE ORAL at 06:33

## 2025-07-03 RX ADMIN — GABAPENTIN 300 MG: 300 CAPSULE ORAL at 21:41

## 2025-07-03 RX ADMIN — WARFARIN SODIUM 8 MG: 4 TABLET ORAL at 17:20

## 2025-07-03 ASSESSMENT — ACTIVITIES OF DAILY LIVING (ADL)
ADLS_ACUITY_SCORE: 42

## 2025-07-03 NOTE — PROGRESS NOTES
Date: 7/3/2025  Time: 1030     Data:  Pre Wt:   92.4 kg (standing scale)  Desired Wt:  - 2.5 kg   Post Wt:  89.9 kg (estimated)  Weight change: - 2.5 kg  Ultrafiltration - Post Run Net Total Removed (mL):  2500 ml  Vascular Access Status: CVC patent  Dialyzer Rinse:  Light  Total Blood Volume Processed: 0 L   Total Dialysis (Treatment) Time:   2.5 Hrs  Dialysate Bath: N/A, uf only  ,   Heparin: heparin lock-blue lumen-1.9 ml                                     Red lumen-1.9 ml    Lab:   No    Interventions:  Dialysis done through right tunneled CVC  BP was soft throughout run, midodrine and albumin administered per MAR, for pressure support  Pt c/o of leg cramping at the end of run, uf off.  Critl ine shows profile A and B throughout run tolerating uf pull   Treatment has ended safely and  blood is rinsed back completely.  Catheter lumens flushed with saline and locked with heparin, catheter caps (ClearGuard ) changed post HD.  Report given to PETER Garcia and sent back to 7206-02 with stable condition     Assessment:  A & O x 4, intermittent confusion,calm & cooperative, denies pain  CVC dressing due on 7/6/2025. CVC intact, clean and dry.                 Plan:    Per Renal team        ANAND VIVASN, RN  Acute Dialysis RN  [facility] [place]

## 2025-07-03 NOTE — PROGRESS NOTES
BP 91/63 (BP Location: Right arm)   Pulse 68   Temp 97.5  F (36.4  C) (Oral)   Resp 14   Wt 92.4 kg (203 lb 11.3 oz)   SpO2 99%   BMI 27.63 kg/m      Shift: 2152-3664  Isolation Status: standard  VS: softer pressures on room air (intermittently needing 1L nc), afebrile  Neuro: able to answer orientation questions, intermittently confused  Behaviors: calm, able to make needs known  BG: none  Labs: Na 135, creat 4.05, Hgb 8.8  Respiratory: on room air, continuous pulse ox  Cardiac: WDL  Pain/Nausea: L arm pain  PRN: oxycodone x1  Diet: regular + 1.2 L FR; had been NPO for EGD today  IV Access: R PIV x3  Infusion(s): RN managed heparin @1700U/hr  Lines/Drains: none  GI/: anuric, BM this am   Skin: L arm bruising  Mobility: SBA  Events/Education: dialysis today 2.5L off, EGD today-- no interventions needed  Plan: continue POC and notify team of changes, plan for dialysis tomorrow afternoon

## 2025-07-03 NOTE — PLAN OF CARE
/73 (BP Location: Right arm)   Pulse 73   Temp 97.4  F (36.3  C) (Oral)   Resp 16   Wt 92.4 kg (203 lb 11.3 oz)   SpO2 94%   BMI 27.63 kg/m      Shift: 0498-4193  VS: Soft pressures, 1L nasal cannula overnight, all other stable, afebrile  Neuro: Alert and oriented x4, intermittent confusion   Labs: Awaiting AM labs, cr 5.50, hgb 8.0  Pain/Nausea: Pain managed with prn tylenol x1, prn oxy x2, denies nausea   Diet: Renal diet, 1.2 L FR, NPO at 0700 for EGD   IV Access: R HD line, R PIV x3  Infusion(s): Heparin at 1700, xa recheck with AM labs   GI/: Anuric, BM x1 overnight   Skin: Para site, L arm bruising from fistula re cannulation   Mobility: Up SBA, walked in halls x1 overnight   Plan: Continue with POC and notify team with any changes. Dialysis today at 0740, EGD in afternoon    Goal Outcome Evaluation:      Plan of Care Reviewed With: patient    Overall Patient Progress: improvingOverall Patient Progress: improving    Outcome Evaluation: Hypotensive overnight but bp improving, otherwise vitals stable, L arm pain managed with prn meds

## 2025-07-03 NOTE — OR NURSING
Pt tolerated EGD very well, under conscious sedation. No interventions were required. Pt was returned to the PCU on RA  Transport came to  and return the pt to PCU. Pt was unarousable. . Pt placed on 2 l O2 and will return the pt when he is more awake.  Report was called to the pts floor nurse

## 2025-07-03 NOTE — PROGRESS NOTES
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 Presented to [facility] ED for hypoxia and AMS. Appeared to be volume overloaded on ED exam, subsequently admitted to [facility] 6/26 for HD. However after HD pt was persistently hypotensive despite midodrine, therefore started on pressors and transferred to [facility] given bed availability. Treating for shock 2/2 presumed infection, also found to have PE.     #ESKD: dialyzes MWF at [facility] with Dr. Bradshaw. Access: tunneled RIJ (clotted L AVF s/p revision with Dr Mcneil 6/24/25, not to be used for 1 month). Run time: 3.5 hrs. TW 89.5 kg. Active on tx list here and is also being evaluated at [facility] for SLK  - dialysis per MWF schedule with extra UF runs until TW is achieved (plan to continue on MWF schedule after Friday 7/4)     #BP/Volume: anuric, TW 89.5 kg   - hx of intradialytic hypotension   - evidence of volume overload on CXR and echo; and hepatology indicates likely congestive hepatopathy  - echo 6/27 with EF 60-65%, flattened septum c/w R ventricular pressure and volume overload (also in setting of new PE), IVC diameter and resp changes in intermediate range; no pericardial effusion  - s/p 3 L paracentesis on 6/27    -  on midodrine 20 mg TID  - ordered prn midodrine 10 mg for use during HD; also using albumin  - ok for SBP > 85 if asymptomatic on dialysis  - pre HD standing weight 92.4 kg (dry weight 89.5 kg)  - achieved 2.5L off today, should be very close to TW  - continue 1200 ml fluid restriction  - plan is for repeat echo now that pt is back to TW     #Anemia of CKD: on mircera 30 mcg [id number]  --Has had some blood loss related to leaking around CVC in setting of thromboyctopenia, hgb 8.8 today s/p  "transfusion 7/1  - continue epogen 4000 units per HD MWF  - transfusion per primary team     #BMD: Ca 8-9's, alb 3.6, phos 4.7     #ID: Initially on vanc/zosyn now only on ceftriaxone for possible HAP     #Afib: has been on DOAC vs warfarin on and off  #New PE: on heparin and now started on warfarin  - CT with \"PE in posterior segmental and subsegmental arterial branches in the left upper lobe. No evidence of right heart strain.\"  - restarted on warfarin    JEANCARLOS Fowler   Division of Nephrology and Hypertension  Vocera Web Console    Recommendations communicated to primary team via this note    Interval History :   Seen on dialysis, 2.5L off, should be very close to TW. Plan is for repeat echo to assess now that euvolemic. No n/v, CP, SOB, chills    Review of Systems:   4 point ROS neg other than as noted above    Physical Exam:   I/O last 3 completed shifts:  In: 440 [P.O.:440]  Out: 2500 [Other:2500]   /73 (BP Location: Right arm)   Pulse 62   Temp 98.6  F (37  C) (Oral)   Resp 20   Wt 92.4 kg (203 lb 11.3 oz)   SpO2 94%   BMI 27.63 kg/m       GENERAL APPEARANCE: NAD  EYES: no scleral icterus, pupils equal  Pulmonary: CTA  CV: RRR   - Edema trace LE, slightly distended abdomen  GI: soft  MS: no evidence of inflammation in joints, no muscle tenderness  : no gomez  SKIN: no rash  NEURO: face symmetric, a/o3  Access: tunneled RIJ (newly revised L AVF, significant bruising)    Labs:   All labs reviewed by me  Electrolytes/Renal -   Recent Labs   Lab Test 07/03/25 0530 07/02/25  0554 07/01/25  0452    131* 133*   POTASSIUM 3.8 4.0 3.7   CHLORIDE 96* 94* 96*   CO2 22 22 24   BUN 25.4* 39.1* 25.8*   CR 4.05* 5.50* 3.97*   * 96 93   KELLY 9.4 9.1 8.7*   MAG 1.8 1.8 1.7   PHOS 3.2 4.7* 3.8       CBC -   Recent Labs   Lab Test 07/03/25  0530 07/02/25  0554 07/01/25  0452   WBC 5.3 5.0 2.9*   HGB 8.8* 8.0* 6.3*   * 90* 72*       LFTs -   Recent Labs   Lab Test 07/03/25  0530 " 07/02/25  0554 07/01/25  0452   ALKPHOS 132 124 114   BILITOTAL 0.6 0.5 0.5   ALT 8 8 10   AST 20 22 24   PROTTOTAL 7.3 6.8 6.2*   ALBUMIN 4.1 3.6 3.6       Iron Panel -   Recent Labs   Lab Test 10/24/23  0537 04/14/23  0725 04/06/23  0808 03/25/23  1058 03/12/23  0756   IRON 38* 60* 78  --  37*   IRONSAT 20 29  --   --  26   HOSSEIN 423* 286  --    < > 252    < > = values in this interval not displayed.         Imaging:  All imaging reviewed by me.     Current Medications:  Current Facility-Administered Medications   Medication Dose Route Frequency Provider Last Rate Last Admin    aspirin (ASA) chewable tablet 81 mg  81 mg Oral Daily Rashida Olivia MD   81 mg at 07/03/25 1045    cefTRIAXone (ROCEPHIN) 2 g vial to attach to  ml bag for ADULTS or NS 50 ml bag for PEDS  2 g Intravenous Q24H Katia Garza   2 g at 07/03/25 1046    droxidopa (NORTHERA) capsule 100 mg  100 mg Oral TID Justyna Us   100 mg at 07/03/25 0633    lacosamide (VIMPAT) tablet 50 mg  50 mg Oral Daily Rashida Olivia MD   50 mg at 07/03/25 1046    midodrine (PROAMATINE) tablet 20 mg  20 mg Oral Q8H Mary Beth Duvall MD   20 mg at 07/03/25 0632    pantoprazole (PROTONIX) EC tablet 40 mg  40 mg Oral BID Rashida Olivia MD   40 mg at 07/03/25 1046    rOPINIRole (REQUIP) tablet 0.5 mg  0.5 mg Oral BID Rashida Olivia MD   0.5 mg at 07/03/25 1046    sodium chloride (PF) 0.9% PF flush 9 mL  9 mL Intracatheter During Dialysis/CRRT (from stock) Teressa Olivas PA        sodium chloride (PF) 0.9% PF flush 9 mL  9 mL Intracatheter During Dialysis/CRRT (from stock) Teressa Olivas PA        sodium chloride 0.9% BOLUS 500 mL  500 mL Hemodialysis Machine Once Teressa Olivas PA        tacrolimus (GENERIC EQUIVALENT) capsule 0.5 mg  0.5 mg Oral Q12H Jacklyn Liu, STEFFANY CNP   0.5 mg at 07/03/25 0633    Warfarin Dose Required Daily - Pharmacist Managed  1 each Oral See Admin Instructions Darline Hollis MD         Current Facility-Administered  Medications   Medication Dose Route Frequency Provider Last Rate Last Admin    [Held by provider] heparin 25,000 units in 0.45% NaCl 250 mL ANTICOAGULANT infusion  0-5,000 Units/hr Intravenous Continuous Katia Garza   Stopped at 07/03/25 0844     JEANCARLOS Fowler

## 2025-07-03 NOTE — PROGRESS NOTES
[facility]    Progress Note - Medicine Service, MAROON TEAM 4       Date of Admission:  6/27/2025    Assessment & Plan   Blanco Osborne is a 60 year old male admitted on 6/27/2025. He has a PMH cirrhosis secondary to NAFLD s/p liver transplant, ESRD on MWF HD, PEA arrest 2/2 hypoxic respiratory failure (2022), pAF, CVA, HLD, GERD, and epilepsy who was admitted to [facility] 6/26/2025 for encephalopathy, acute hypoxic respiratory failure, and hypotension. He was admitted to ICU for pressors requirements (septic vs. Hypovolemic shock) found to have PE now on heparin gtt while bridging to warfarin.       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 Per review, HD is difficult for him to handle given hypotension- requires midodrine with HD. Currently being evaluated at [place] for kidney transplant (though key question, given poor hepatic function, is whether he should proceed with kidney transplant alone). Based on his picture and this ICU stay, it appears his transplant will need to occur more urgently as he has high likelihood of readmission to the ICU. Dry weight list as 89.5kg. He typically gets MWF HD< has been daily this week in an attempt to get to dry weight.   - Nephrology consulted, HD planned next for 7/4  - Midodrine 20mg TID  - Droxidopa TID  -PRN tylenol   -PRN oxycodone 5mg Q4H     # AHRF  # Pulmonary Embolism  #Edema with pleural effusions   #Pneumonia   Likely multifactorial in setting of presumed pneumonia, edema and new pulmonary embolism.  He was started on a heparin gtt.  His oxygen requirements have been weaning down. Given plan for possible kidney transplant will  restart warfarin. Will bridge with heparin.    -Bridge with heparin, will need to remain inpatient until therapeutic  -Ceftriaxone 6/30- Plan for 7 total days   - Follow cultures NGTD      #Pancytopenia   # Chronic macrocytic anemia   On 6/30 developed pancytopenia. Unclear etiology at this point. Medication induced, versus dilutional with volume. HIIT screem negative. Zosyn could also cause acute drop in platelets.   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 He has had ascites now several years requiring intermittent paracenteses. Per report, pt has a number of features consistent with clinically significant portal hypertension. He had liver biopsy in our system 4/09/2025 showed severe hepatitis with many plasma cells, activity grade 3-4 /4; likely mild fibrosis. Hepatology suspects that NRH is contributing factor to his underlying portal hypertension- this along with his ESRD and difficulty pulling fluid during dialysis all contributing to his ascites. NO signs of EV with EGD on 7/3.  - Continue PTA tacrolimus: 1mg BID               - tacro level in AM  - SBP management: on ceftriaxone   - Has hx SBP: last culture data visible is in 2022, lactobacillus, notably not on prophylactic cipro -- likely will need in future  - ethyl alcohol level (while no history of active use, some notes point out alcohol use post transplant. AST 3x the level of ALT as well, plus evidence of fibrosis and hepatitis on liver biopsy) -- PETH with moderate use     --- Chronic Medical Problems ----     # Tricuspid Insufficiency  #Pulmonary HTN  New on echocardiogram 6/27/25, as compared to previous study 1/11/24. Appears functional, RV with normal  "size and normal function (borderline reduced). Moderate to severe tricuspid regurge possible 2/2 volume overload.  - Repeat echo once euvolemic   - PTA ASA 81      #Hx CVA  - PTA ASA 81mg       #RLS  - PTA ropinirole 0.5mg BID     #Hx seizures  - Continue lacosamide 50mg daily (of note, Jacobo note states that pt takes every other day as he is being \"weaned off\" by his neurologist\". Per 10/24 note review, neurology advised pt to stay on this medication).   - Lacosamide level at goal      #CHRISTIAN  Unclear if uses CPAP at home.      #GERD  - PTA PPI     #Hypotension   #Hx chronic hypotension  Pt has ongoing concerns with chronic hypotension: typically takes midodrine on dialysis days and sometime he takes extra doses as needed for symptoms. His average systolic BP is 80s-90s and he can feel asymptomatic with BP in the 80s.  He has been difficult to dialyze due to this chronic hypotension.  After discussion with family it appears he had not been taking his midodrine totally as prescribed.  He was weaned off nor epi and midodrine was increased.  Suspect majority of his presentation is chronic in nature.  Low concern for shock picture   - Continue midodrine 20 mg every 8 hours             Diet: Fluid restriction 1200 ML FLUID  Snacks/Supplements Adult: Nepro Oral Supplement; With Meals  Regular Diet Adult    DVT Prophylaxis: heparin bridge and warfarin  Nixon Catheter: Not present  Fluids: none  Lines: PRESENT      CVC Double Lumen Right Internal jugular Tunneled-Site Assessment: WDL      Cardiac Monitoring: None  Code Status: Full Code      Clinically Significant Risk Factors         # Hyponatremia: Lowest Na = 131 mmol/L in last 2 days, will monitor as appropriate  # Hypochloremia: Lowest Cl = 94 mmol/L in last 2 days, will monitor as appropriate      # Hypoalbuminemia: Lowest albumin = 3.4 g/dL at 6/30/2025  6:34 AM, will monitor as appropriate   # Thrombocytopenia: Lowest platelets = 90 in last 2 days, will monitor " for bleeding   # Hypertension: Noted on problem list            # Overweight: Estimated body mass index is 27.63 kg/m  as calculated from the following:    Height as of 6/24/25: 1.829 m (6').    Weight as of this encounter: 92.4 kg (203 lb 11.3 oz).      # Financial/Environmental Concerns: none         Social Drivers of Health   Alcohol Use: Unknown (10/7/2019)    AUDIT-C     Frequency of Alcohol Consumption: Monthly or less   Physical Activity: Insufficiently Active (4/18/2025)    Received from [facility]    Exercise Vital Sign     Days of Exercise per Week: 2 days     Minutes of Exercise per Session: 10 min         Disposition Plan     Medically Ready for Discharge: Anticipated in 2-4 Days         The patient's care was discussed with the Attending Physician, Dr. Weber.    Darline Hollis MD  Medicine Service, [address]  Securely message with [employer] (more info)  Text page via [facility] Paging/Directory   See signed in provider for up to date coverage information  ______________________________________________________________________    Interval History   NAEO. Patient feeling ok today. Tolerated dialysis well, 2.5L removed. Has ongoing left arm pain today, improves with oxycodone. Underwent EGD without issues.    Physical Exam   Vital Signs: Temp: 97.5  F (36.4  C) Temp src: Oral BP: 91/63 Pulse: 68   Resp: 14 SpO2: 99 % O2 Device: Nasal cannula Oxygen Delivery: 1 LPM  Weight: 203 lbs 11.28 oz    General: awake, alert, no distress during dialysis  Neuro: Awake, no focal deficits, mentation intact  Pulm/Resp: On room air lying in bed, Breathing comfortably, lungs clear bilat  CV: RRR, no murmur, well perfused extremities, trace edema  Abdomen: soft, distended, nontender, mild fluid shift  Skin: Bruising to the LUE (site of fistula recannulation), edges marked with pen. Ecchymosis present. Soft tissue swelling medial to elbow, no joint swelling       Medical  Decision Making             Data     I have personally reviewed the following data over the past 24 hrs:    5.3  \   8.8 (L)   / 112 (L)     135 96 (L) 25.4 (H) /  100 (H)   3.8 22 4.05 (H) \     ALT: 8 AST: 20 AP: 132 TBILI: 0.6   ALB: 4.1 TOT PROTEIN: 7.3 LIPASE: N/A     INR:  1.23 (H) PTT:  N/A   D-dimer:  N/A Fibrinogen:  N/A

## 2025-07-04 LAB
ALBUMIN SERPL BCG-MCNC: 3.9 G/DL (ref 3.5–5.2)
ALP SERPL-CCNC: 124 U/L (ref 40–150)
ALT SERPL W P-5'-P-CCNC: 6 U/L (ref 0–70)
ANION GAP SERPL CALCULATED.3IONS-SCNC: 13 MMOL/L (ref 7–15)
AST SERPL W P-5'-P-CCNC: 17 U/L (ref 0–45)
BILIRUB SERPL-MCNC: 0.4 MG/DL
BUN SERPL-MCNC: 36.9 MG/DL (ref 8–23)
CALCIUM SERPL-MCNC: 9.6 MG/DL (ref 8.8–10.4)
CHLORIDE SERPL-SCNC: 94 MMOL/L (ref 98–107)
CREAT SERPL-MCNC: 5.66 MG/DL (ref 0.67–1.17)
EGFRCR SERPLBLD CKD-EPI 2021: 11 ML/MIN/1.73M2
ERYTHROCYTE [DISTWIDTH] IN BLOOD BY AUTOMATED COUNT: 20.4 % (ref 10–15)
GLUCOSE SERPL-MCNC: 98 MG/DL (ref 70–99)
HCO3 SERPL-SCNC: 23 MMOL/L (ref 22–29)
HCT VFR BLD AUTO: 27.9 % (ref 40–53)
HGB BLD-MCNC: 8.4 G/DL (ref 13.3–17.7)
INR PPP: 1.38 (ref 0.85–1.15)
MAGNESIUM SERPL-MCNC: 1.9 MG/DL (ref 1.7–2.3)
MCH RBC QN AUTO: 33.5 PG (ref 26.5–33)
MCHC RBC AUTO-ENTMCNC: 30.1 G/DL (ref 31.5–36.5)
MCV RBC AUTO: 111 FL (ref 78–100)
PHOSPHATE SERPL-MCNC: 4.2 MG/DL (ref 2.5–4.5)
PLATELET # BLD AUTO: 86 10E3/UL (ref 150–450)
POTASSIUM SERPL-SCNC: 3.6 MMOL/L (ref 3.4–5.3)
PROT SERPL-MCNC: 7 G/DL (ref 6.4–8.3)
PROTHROMBIN TIME: 16.9 SECONDS (ref 11.8–14.8)
RBC # BLD AUTO: 2.51 10E6/UL (ref 4.4–5.9)
SODIUM SERPL-SCNC: 130 MMOL/L (ref 135–145)
UFH PPP CHRO-ACNC: 0.28 IU/ML (ref ?–1.1)
UFH PPP CHRO-ACNC: 0.31 IU/ML (ref ?–1.1)
UFH PPP CHRO-ACNC: 0.49 IU/ML (ref ?–1.1)
WBC # BLD AUTO: 4.4 10E3/UL (ref 4–11)

## 2025-07-04 PROCEDURE — 99233 SBSQ HOSP IP/OBS HIGH 50: CPT | Mod: GC | Performed by: STUDENT IN AN ORGANIZED HEALTH CARE EDUCATION/TRAINING PROGRAM

## 2025-07-04 PROCEDURE — 634N000001 HC RX 634: Mod: JZ | Performed by: PHYSICIAN ASSISTANT

## 2025-07-04 PROCEDURE — 36415 COLL VENOUS BLD VENIPUNCTURE: CPT

## 2025-07-04 PROCEDURE — 250N000011 HC RX IP 250 OP 636: Performed by: PHYSICIAN ASSISTANT

## 2025-07-04 PROCEDURE — 250N000013 HC RX MED GY IP 250 OP 250 PS 637

## 2025-07-04 PROCEDURE — 250N000012 HC RX MED GY IP 250 OP 636 PS 637: Performed by: STUDENT IN AN ORGANIZED HEALTH CARE EDUCATION/TRAINING PROGRAM

## 2025-07-04 PROCEDURE — 85520 HEPARIN ASSAY: CPT

## 2025-07-04 PROCEDURE — 85014 HEMATOCRIT: CPT

## 2025-07-04 PROCEDURE — 120N000011 HC R&B TRANSPLANT UMMC

## 2025-07-04 PROCEDURE — 250N000013 HC RX MED GY IP 250 OP 250 PS 637: Performed by: STUDENT IN AN ORGANIZED HEALTH CARE EDUCATION/TRAINING PROGRAM

## 2025-07-04 PROCEDURE — 258N000003 HC RX IP 258 OP 636: Performed by: PHYSICIAN ASSISTANT

## 2025-07-04 PROCEDURE — 85610 PROTHROMBIN TIME: CPT

## 2025-07-04 PROCEDURE — 99233 SBSQ HOSP IP/OBS HIGH 50: CPT | Performed by: INTERNAL MEDICINE

## 2025-07-04 PROCEDURE — 84100 ASSAY OF PHOSPHORUS: CPT

## 2025-07-04 PROCEDURE — 250N000013 HC RX MED GY IP 250 OP 250 PS 637: Performed by: PHYSICIAN ASSISTANT

## 2025-07-04 PROCEDURE — 80053 COMPREHEN METABOLIC PANEL: CPT

## 2025-07-04 PROCEDURE — 83735 ASSAY OF MAGNESIUM: CPT

## 2025-07-04 PROCEDURE — 90935 HEMODIALYSIS ONE EVALUATION: CPT

## 2025-07-04 PROCEDURE — 250N000011 HC RX IP 250 OP 636

## 2025-07-04 RX ORDER — WARFARIN SODIUM 6 MG/1
6 TABLET ORAL
Status: COMPLETED | OUTPATIENT
Start: 2025-07-04 | End: 2025-07-04

## 2025-07-04 RX ADMIN — MIDODRINE HYDROCHLORIDE 20 MG: 5 TABLET ORAL at 08:53

## 2025-07-04 RX ADMIN — PANTOPRAZOLE SODIUM 40 MG: 40 TABLET, DELAYED RELEASE ORAL at 08:54

## 2025-07-04 RX ADMIN — OXYCODONE HYDROCHLORIDE 5 MG: 5 TABLET ORAL at 20:16

## 2025-07-04 RX ADMIN — HEPARIN SODIUM 1900 UNITS: 1000 INJECTION, SOLUTION INTRAVENOUS; SUBCUTANEOUS at 14:58

## 2025-07-04 RX ADMIN — SODIUM CHLORIDE 250 ML: 9 INJECTION, SOLUTION INTRAVENOUS at 12:26

## 2025-07-04 RX ADMIN — DROXIDOPA 100 MG: 100 CAPSULE ORAL at 14:17

## 2025-07-04 RX ADMIN — MIDODRINE HYDROCHLORIDE 20 MG: 5 TABLET ORAL at 00:00

## 2025-07-04 RX ADMIN — TACROLIMUS 0.5 MG: 0.5 CAPSULE ORAL at 08:53

## 2025-07-04 RX ADMIN — HEPARIN SODIUM 1700 UNITS/HR: 10000 INJECTION, SOLUTION INTRAVENOUS at 03:53

## 2025-07-04 RX ADMIN — EPOETIN ALFA-EPBX 4000 UNITS: 10000 INJECTION, SOLUTION INTRAVENOUS; SUBCUTANEOUS at 14:07

## 2025-07-04 RX ADMIN — OXYCODONE HYDROCHLORIDE 5 MG: 5 TABLET ORAL at 14:56

## 2025-07-04 RX ADMIN — SODIUM CHLORIDE 200 ML: 9 INJECTION, SOLUTION INTRAVENOUS at 12:26

## 2025-07-04 RX ADMIN — OXYCODONE HYDROCHLORIDE 5 MG: 5 TABLET ORAL at 04:17

## 2025-07-04 RX ADMIN — DROXIDOPA 100 MG: 100 CAPSULE ORAL at 08:53

## 2025-07-04 RX ADMIN — HEPARIN SODIUM 1850 UNITS/HR: 10000 INJECTION, SOLUTION INTRAVENOUS at 14:05

## 2025-07-04 RX ADMIN — TACROLIMUS 0.5 MG: 0.5 CAPSULE ORAL at 18:15

## 2025-07-04 RX ADMIN — PANTOPRAZOLE SODIUM 40 MG: 40 TABLET, DELAYED RELEASE ORAL at 20:14

## 2025-07-04 RX ADMIN — WARFARIN SODIUM 6 MG: 6 TABLET ORAL at 18:15

## 2025-07-04 RX ADMIN — MIDODRINE HYDROCHLORIDE 20 MG: 5 TABLET ORAL at 16:37

## 2025-07-04 RX ADMIN — Medication: at 12:27

## 2025-07-04 RX ADMIN — HEPARIN SODIUM 1850 UNITS/HR: 10000 INJECTION, SOLUTION INTRAVENOUS at 16:59

## 2025-07-04 RX ADMIN — OXYCODONE HYDROCHLORIDE 5 MG: 5 TABLET ORAL at 10:22

## 2025-07-04 RX ADMIN — MIDODRINE HYDROCHLORIDE 10 MG: 5 TABLET ORAL at 11:23

## 2025-07-04 RX ADMIN — ROPINIROLE HYDROCHLORIDE 0.5 MG: 0.5 TABLET, FILM COATED ORAL at 20:14

## 2025-07-04 RX ADMIN — ROPINIROLE HYDROCHLORIDE 0.5 MG: 0.5 TABLET, FILM COATED ORAL at 08:53

## 2025-07-04 RX ADMIN — ASPIRIN 81 MG CHEWABLE TABLET 81 MG: 81 TABLET CHEWABLE at 08:53

## 2025-07-04 RX ADMIN — DROXIDOPA 100 MG: 100 CAPSULE ORAL at 20:14

## 2025-07-04 RX ADMIN — LACOSAMIDE 50 MG: 50 TABLET, FILM COATED ORAL at 08:54

## 2025-07-04 ASSESSMENT — ACTIVITIES OF DAILY LIVING (ADL)
ADLS_ACUITY_SCORE: 42
ADLS_ACUITY_SCORE: 42
ADLS_ACUITY_SCORE: 39
ADLS_ACUITY_SCORE: 42
ADLS_ACUITY_SCORE: 42
ADLS_ACUITY_SCORE: 39
ADLS_ACUITY_SCORE: 42
ADLS_ACUITY_SCORE: 39
ADLS_ACUITY_SCORE: 42
ADLS_ACUITY_SCORE: 39
ADLS_ACUITY_SCORE: 39

## 2025-07-04 NOTE — PROGRESS NOTES
"CLINICAL NUTRITION SERVICES - BRIEF NOTE     Nutrition Prescription    RECOMMENDATIONS FOR MDs/PROVIDERS TO ORDER:  Strongly recommend outpatient RD consult for further education.      REASON FOR ASSESSMENT  Blanco Osborne is a/an 60 year old male assessed by the dietitian for Provider Order - Nutrition Education - \"Discuss protein rich diet for ESRD, diet consistent in Vitamin K while on warfarin\"    EVALUATION OF THE PROGRESS TOWARD GOALS   Diet: Regular Diet Adult + 1.2 L fluid restriction    Snacks/Supplements: Nepro daily  Intake: Unable to assess.      NEW FINDINGS   Attempted to provide education, pt out of room and staff in hallway noted pt at dialysis.   Handouts left on bedside table:    High-Calorie, High-Protein Nutrition Therapy    Tips for Adding Protein   Vitamin K and Medications    NUTRITION DIAGNOSIS   Food and nutrition related knowledge deficit related to need for nutrition education as evidenced by consult.    INTERVENTIONS   Manage composition of oral intake  Nutrition education content    GOALS   Demonstrates understanding of nutrition education    Monitoring/Evaluation  Progress toward goals will be monitored and evaluated per protocol.     Iveth Guy RD, LD  Available on [employer] (\"5A Clinical Dietitian\" or \"6A Clinical Dietitian\")     "

## 2025-07-04 NOTE — PLAN OF CARE
Goal Outcome Evaluation:      Plan of Care Reviewed With: patient    Overall Patient Progress: no change    Outcome Evaluation: Soft Bps, pain well managed    BP 94/61 (BP Location: Right arm)   Pulse 64   Temp 98.1  F (36.7  C) (Oral)   Resp 15   Wt 90.7 kg (199 lb 14.4 oz)   SpO2 99%   BMI 27.11 kg/m      Shift: 8201-0047   VS: Soft Bps otherwise stable on 1L NC, afebrile  Neuro: Aox4, intermittent confusion  Behaviors: Calm and cooperative  Labs: Heparin Anti-xa 0.31  Respiratory: WDL  Cardiac: WDL  Pain/Nausea: Pain around fistula, denies nausea  PRN: Gabapentin x1, Oxycodone x1  Diet: Regular diet, 1.2L FR  IV Access: PIV x3  Infusion(s): Heparin gtt @ 1700 units/hr  GI/: Anuric, 2 BMs this shift  Skin: Bruising on L arm  Mobility: Stand-by assist  [facility]

## 2025-07-04 NOTE — PROGRESS NOTES
HEMODIALYSIS TREATMENT NOTE      Date: 7/4/2025     Data:  Pre Wt:   91.2 kg (Standing scale)  Desired Wt:   To be established  Post Wt:  89.3 kg (Standing scale)  Weight change: - 1.9 kg  Ultrafiltration - Post Run Net Total Removed (mL):  2000 ml  Vascular Access Status: CVC patent  Dialyzer Rinse:  Light   Total Blood Volume Processed: 81.35 L   Total Dialysis (Treatment) Time:   3.5 Hrs  Dialysate Bath: K 3, Ca 2.25  Heparin: Heparin: None     Lab:   Yes  HbsAg - 6/22/25 (Non reactive)  HbsAb - 6/22/25  (Susceptible)     Interventions:  Dialysis done through Right tunneled dialysis catheter. ,   UF set to 2.3 Liters of fluid removal, accommodating priming and rinse back volumes  Epoetin administered per MAR  CVC dressing changed aseptically  See Flowsheet for Crit Profile throughout the run  BP was soft through out HD run but SBP maintained above 85, Pre HD PO midodrine given. Patient was asymptomatic and tolerated HD treatment with net UF removal of 2.0L.  Treatment has ended safely and blood is rinsed back completely  Catheter lumens flushed with saline and locked with Heparin, catheter caps changed post HD  Post Tx assessment done. Patient's sent back to [facility] in [facility] condition  Report given to PETER Blanc.     Assessment:  A/O x 4, calm & cooperative, complained of left arm pain  Lung sounds  anterior and lateral BUL,  BLL: clear ; diminished  CVC intact, previous dressing clean and dry                Plan:    Per Renal team

## 2025-07-04 NOTE — PROGRESS NOTES
[facility]    Progress Note - Medicine Service, MAROON TEAM 4       Date of Admission:  6/27/2025    Assessment & Plan   Blanco Osborne is a 60 year old male admitted on 6/27/2025. 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 He was admitted to ICU for pressors requirements (septic vs. Hypovolemic shock) found to have PE now on heparin gtt while bridging to warfarin.       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 Per review, HD is difficult for him to handle given hypotension- requires midodrine with HD. Currently being evaluated at [place] for kidney transplant. Dry weight list as 89.5kg. which he has been getting close to with daily UF runs this week. He typically gets MWF HD  - Nephrology consulted, HD planned next for 7/7  - Midodrine 20mg TID  - Droxidopa TID  -PRN tylenol   -PRN oxycodone 5mg Q4H     # AHRF  # Pulmonary Embolism  #Edema with pleural effusions   #Pneumonia   Likely multifactorial in setting of presumed pneumonia, edema and new pulmonary embolism.  He was started on a heparin gtt.  His oxygen requirements have been weaning down. Given plan for possible kidney transplant will  restart warfarin. Will bridge with heparin.   -Bridge with heparin, will need to remain inpatient until therapeutic  -Ceftriaxone 6/30- Plan for 7 total days   - Follow cultures NGTD      #Pancytopenia   # Chronic macrocytic anemia   On 6/30 developed pancytopenia. Unclear etiology at this point. Medication  induced, versus dilutional with volume. HIIT screem negative. Zosyn could also cause acute drop in platelets.   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 He has had ascites now several years requiring intermittent paracenteses. Per report, pt has a number of features consistent with clinically significant portal hypertension. He had liver biopsy in our system 4/09/2025 showed severe hepatitis with many plasma cells, activity grade 3-4 /4; likely mild fibrosis. Hepatology suspects that NRH is contributing factor to his underlying portal hypertension- this along with his ESRD and difficulty pulling fluid during dialysis all contributing to his ascites. NO signs of EV with EGD on 7/3.  - Continue PTA tacrolimus: 1mg BID               - tacro level in AM  - SBP management: on ceftriaxone   - Has hx SBP: last culture data visible is in 2022, lactobacillus, notably not on prophylactic cipro -- likely will need in future  - ethyl alcohol level (while no history of active use, some notes point out alcohol use post transplant. AST 3x the level of ALT as well, plus evidence of fibrosis and hepatitis on liver biopsy) -- PETH with moderate use     --- Chronic Medical Problems ----     # Tricuspid Insufficiency  #Pulmonary HTN  New on echocardiogram 6/27/25, as compared to previous study 1/11/24. Appears functional, RV with normal size and normal function (borderline reduced). Moderate to severe tricuspid regurge possible 2/2 volume overload.  - Repeat echo 7/4   - PTA ASA 81      #Hx CVA  - PTA ASA 81mg       #RLS  - PTA ropinirole 0.5mg BID     #Hx seizures  - Continue lacosamide 50mg  "daily (of note, Jacobo note states that pt takes every other day as he is being \"weaned off\" by his neurologist\". Per 10/24 note review, neurology advised pt to stay on this medication).   - Lacosamide level at goal      #CHRISTIAN  Unclear if uses CPAP at home.      #GERD  - PTA PPI     #Hypotension   #Hx chronic hypotension  Pt has ongoing concerns with chronic hypotension: typically takes midodrine on dialysis days and sometime he takes extra doses as needed for symptoms. His average systolic BP is 80s-90s and he can feel asymptomatic with BP in the 80s.  He has been difficult to dialyze due to this chronic hypotension.  After discussion with family it appears he had not been taking his midodrine totally as prescribed.  He was weaned off nor epi and midodrine was increased.  Suspect majority of his presentation is chronic in nature.  Low concern for shock picture   - Continue midodrine 20 mg every 8 hours              Diet: Fluid restriction 1200 ML FLUID  Snacks/Supplements Adult: Nepro Oral Supplement; With Meals  Regular Diet Adult    DVT Prophylaxis: Warfarin and heparin  Nixon Catheter: Not present  Fluids: none  Lines: PRESENT      CVC Double Lumen Right Internal jugular Tunneled-Site Assessment: WDL      Cardiac Monitoring: None  Code Status: Full Code      Clinically Significant Risk Factors         # Hyponatremia: Lowest Na = 130 mmol/L in last 2 days, will monitor as appropriate  # Hypochloremia: Lowest Cl = 94 mmol/L in last 2 days, will monitor as appropriate      # Hypoalbuminemia: Lowest albumin = 3.4 g/dL at 6/30/2025  6:34 AM, will monitor as appropriate   # Thrombocytopenia: Lowest platelets = 86 in last 2 days, will monitor for bleeding   # Hypertension: Noted on problem list            # Overweight: Estimated body mass index is 26.7 kg/m  as calculated from the following:    Height as of 6/24/25: 1.829 m (6').    Weight as of this encounter: 89.3 kg (196 lb 13.9 oz).      # Financial/Environmental " Concerns: none         Social Drivers of Health   Alcohol Use: Unknown (10/7/2019)    AUDIT-C     Frequency of Alcohol Consumption: Monthly or less   Physical Activity: Insufficiently Active (4/18/2025)    Received from [facility]    Exercise Vital Sign     Days of Exercise per Week: 2 days     Minutes of Exercise per Session: 10 min         Disposition Plan     Medically Ready for Discharge: Anticipated in 2-4 Days         The patient's care was discussed with the Attending Physician, Dr. Weber.    Darline Hollis MD  Medicine Service, [address]  Securely message with [employer] (more info)  Text page via [facility] Paging/Directory   See signed in provider for up to date coverage information  ______________________________________________________________________    Interval History   NAEO. Feeling well today. Continues to have pain in left arm, is working on elevating it.  We discussed his anticoagulation plan going forward and our goal INR of 2-3,  and covered the importance of consistent diet of vitamin K. Dialysis today with 2L fluid removed. Echo planned for today.    Physical Exam   Vital Signs: Temp: 97.5  F (36.4  C) Temp src: Oral BP: 102/64 Pulse: 70   Resp: 18 SpO2: 99 % O2 Device: None (Room air) Oxygen Delivery: 1 LPM  Weight: 196 lbs 13.93 oz    General: awake, alert, no distress, sitting up in chair conversational  Neuro: Awake, no focal deficits, mentation intact  Pulm/Resp: On room air, Breathing comfortably, lungs clear bilat  CV: RRR, no murmur, well perfused extremities, trace edema  Abdomen: soft, distended, nontender, mild fluid shift  Skin: Bruising to the LUE (site of fistula recannulation), edges marked with pen. Ecchymosis present. Soft tissue swelling medial to elbow, no joint swelling    Medical Decision Making             Data     I have personally reviewed the following data over the past 24 hrs:    4.4  \   8.4 (L)   / 86 (L)     130  (L) 94 (L) 36.9 (H) /  98   3.6 23 5.66 (H) \     ALT: 6 AST: 17 AP: 124 TBILI: 0.4   ALB: 3.9 TOT PROTEIN: 7.0 LIPASE: N/A     INR:  1.38 (H) PTT:  N/A   D-dimer:  N/A Fibrinogen:  N/A

## 2025-07-04 NOTE — PROGRESS NOTES
Nephrology Progress Note  07/04/2025         Interval History :   Seen on dialysis, UF set to 2L.  He is not volume up  No n/v, CP, SOB, chills    Assessment & Recommendations:   Blanco Osborne is a 59 yo male with ESRD (on HD MWF since 1/23), NAFLD s/p liver transplant (9/2016), PEA arrest 2/2 hypoxic respiratory failure (2022), pAF, CVA, HLD, GERD, CHRISTIAN on CPAP, RLS, and hx seizures. Presented to [facility] ED for hypoxia and AMS. Appeared to be volume overloaded on ED exam, subsequently admitted to [facility] 6/26 for HD. However after HD pt was persistently hypotensive despite midodrine, therefore started on pressors and transferred to [facility] given bed availability. Treating for shock 2/2 presumed infection, also found to have PE.     #ESKD: dialyzes MWF at [facility] with Dr. Bradshaw. Access: tunneled RIJ (clotted L AVF s/p revision with Dr Mcneil 6/24/25, not to be used for 1 month). Run time: 3.5 hrs. TW 89.5 kg. Active on tx list here and is also being evaluated at [place] for SLK  - dialysis per MWF schedule      #BP/Volume: anuric, TW 89.5 kg   - hx of intradialytic hypotension   - evidence of volume overload on CXR and echo; and hepatology indicates likely congestive hepatopathy  - echo 6/27 with EF 60-65%, flattened septum c/w R ventricular pressure and volume overload (also in setting of new PE), IVC diameter and resp changes in intermediate range; no pericardial effusion  - s/p 3 L paracentesis on 6/27    -  on midodrine 20 mg TID  - ordered prn midodrine 10 mg for use during HD; also using albumin  - ok for SBP > 85 if asymptomatic on dialysis  - pre HD standing weight 91.2 kg (dry weight 89.5 kg)  - continue 1200 ml fluid restriction  - plan is for repeat echo now that pt is back to TW     #Anemia of CKD: on mircera 30 mcg q3byvsp  --Has had some blood loss related to leaking around CVC in setting of thromboyctopenia, hgb 8.8 today s/p transfusion 7/1  - continue epogen 4000 units per HD  "MWF  - transfusion per primary team     #BMD: Ca 8-9's, alb 3.6, phos 4.7     #ID: Initially on vanc/zosyn now only on ceftriaxone for possible HAP     #Afib: has been on DOAC vs warfarin on and off  #New PE: on heparin and now started on warfarin  - CT with \"PE in posterior segmental and subsegmental arterial branches in the left upper lobe. No evidence of right heart strain.\"  - restarted on warfarin    Recommendations communicated to primary team via this note    Review of Systems:   4 point ROS neg other than as noted above    Physical Exam:   I/O last 3 completed shifts:  In: 0   Out: 2500 [Other:2500]   /68   Pulse 64   Temp 98.1  F (36.7  C) (Oral)   Resp 15   Wt 90.7 kg (199 lb 14.4 oz)   SpO2 96%   BMI 27.11 kg/m       GENERAL APPEARANCE: NAD  EYES: no scleral icterus, pupils equal  Pulmonary: CTA  CV: RRR   - Edema trace LE, slightly distended abdomen  GI: soft  MS: no evidence of inflammation in joints, no muscle tenderness  : no gomez  SKIN: no rash  NEURO: face symmetric, a/o3  Access: tunneled RIJ (newly revised L AVF, significant bruising)    Labs:   All labs reviewed by me    Imaging:  All imaging reviewed by me.     Current Medications:  Reviewed    BRITTNEY CHIANG MD  Date of Service (when I saw the patient):July 4, 2025  Assistant Professor_Nephrology     35 minutes spent on the date of the encounter doing chart review, history and exam, documentation, coordinating care and further activities as noted.    "

## 2025-07-04 NOTE — PLAN OF CARE
Goal Outcome Evaluation:      Plan of Care Reviewed With: patient    Overall Patient Progress: improvingOverall Patient Progress: improving       BP 95/58   Pulse 68   Temp 97.9  F (36.6  C) (Oral)   Resp 18   Wt 91.2 kg (201 lb 1.6 oz)   SpO2 93%   BMI 27.27 kg/m      Shift: 2698-5326  VS: soft Bps  on RA, afebrile  Neuro: Aox4  Behaviors: calm and cooperative   Respiratory: diminished sounds at bases  Cardiac: WDL   Pain/Nausea: denies nausea , reports left forearm pain   PRN: midodrine, oxy   Diet: Regular, 1.2 L fluid restriction  IV Access: RHD Line, 3 RPIV ,   Infusion(s): Heparin Drip @ 1850 units/hr  GI/: anuric, last BM today  Skin: Left fore arm bruising   Mobility: SBA w IV pole   Events/Education: pt went to dialysis this shift  Plan: continue plan of care

## 2025-07-04 NOTE — PLAN OF CARE
Goal Outcome Evaluation:      Plan of Care Reviewed With: patient    Overall Patient Progress: improvingOverall Patient Progress: improving    Outcome Evaluation: HD x 1. INR goal 2 (not met INR 1.38)    Vitals: soft BP (sched midodrine) : 93/64 (notify Systolic less than 90. Room air. Afebrile.   BP 93/64 (BP Location: Right arm)   Pulse 70   Temp 98.2  F (36.8  C) (Oral)   Resp 18   Wt 89.3 kg (196 lb 13.9 oz)   SpO2 96%   BMI 26.70 kg/m    Neuro : a x o x 4: bed alarm at night as pt has a hx of confusion.   Blood glucose: none.   Pain/nausea: denies.   Diet: regular : FR 1200 ml. Intake 400 ml.   Lines: PIV R x 2 infusing Heparin @ 1850 units per hr: xa lab @ 8 PM therapeutic: 0.49. redraw @ 0322.  HD line chest CDI  : anuric, HD x 1, 2 liters off.   GI: x 1 on days  Drains: none  Skin: LUE fistula repair site dressing cdi: elevated with pillows, tender  Mobility: sba x 1 x iv pole.     Needs ECHO.

## 2025-07-05 ENCOUNTER — APPOINTMENT (OUTPATIENT)
Dept: CARDIOLOGY | Facility: CLINIC | Age: 60
DRG: 175 | End: 2025-07-05
Payer: COMMERCIAL

## 2025-07-05 ENCOUNTER — HEALTH MAINTENANCE LETTER (OUTPATIENT)
Age: 60
End: 2025-07-05

## 2025-07-05 LAB
ALBUMIN SERPL BCG-MCNC: 4 G/DL (ref 3.5–5.2)
ALP SERPL-CCNC: 126 U/L (ref 40–150)
ALT SERPL W P-5'-P-CCNC: 5 U/L (ref 0–70)
ANION GAP SERPL CALCULATED.3IONS-SCNC: 11 MMOL/L (ref 7–15)
AST SERPL W P-5'-P-CCNC: 17 U/L (ref 0–45)
BILIRUB SERPL-MCNC: 0.5 MG/DL
BUN SERPL-MCNC: 20 MG/DL (ref 8–23)
CALCIUM SERPL-MCNC: 9.3 MG/DL (ref 8.8–10.4)
CHLORIDE SERPL-SCNC: 94 MMOL/L (ref 98–107)
CREAT SERPL-MCNC: 3.57 MG/DL (ref 0.67–1.17)
EGFRCR SERPLBLD CKD-EPI 2021: 19 ML/MIN/1.73M2
ERYTHROCYTE [DISTWIDTH] IN BLOOD BY AUTOMATED COUNT: 20.9 % (ref 10–15)
GLUCOSE SERPL-MCNC: 98 MG/DL (ref 70–99)
HCO3 SERPL-SCNC: 27 MMOL/L (ref 22–29)
HCT VFR BLD AUTO: 28.7 % (ref 40–53)
HGB BLD-MCNC: 8.7 G/DL (ref 13.3–17.7)
INR PPP: 1.64 (ref 0.85–1.15)
LVEF ECHO: NORMAL
MAGNESIUM SERPL-MCNC: 1.8 MG/DL (ref 1.7–2.3)
MCH RBC QN AUTO: 32.6 PG (ref 26.5–33)
MCHC RBC AUTO-ENTMCNC: 30.3 G/DL (ref 31.5–36.5)
MCV RBC AUTO: 108 FL (ref 78–100)
PHOSPHATE SERPL-MCNC: 3.2 MG/DL (ref 2.5–4.5)
PLATELET # BLD AUTO: 96 10E3/UL (ref 150–450)
POTASSIUM SERPL-SCNC: 3.7 MMOL/L (ref 3.4–5.3)
PROT SERPL-MCNC: 7.2 G/DL (ref 6.4–8.3)
PROTHROMBIN TIME: 19.6 SECONDS (ref 11.8–14.8)
RBC # BLD AUTO: 2.67 10E6/UL (ref 4.4–5.9)
SODIUM SERPL-SCNC: 132 MMOL/L (ref 135–145)
UFH PPP CHRO-ACNC: 0.5 IU/ML (ref ?–1.1)
WBC # BLD AUTO: 3.7 10E3/UL (ref 4–11)

## 2025-07-05 PROCEDURE — 99233 SBSQ HOSP IP/OBS HIGH 50: CPT | Performed by: STUDENT IN AN ORGANIZED HEALTH CARE EDUCATION/TRAINING PROGRAM

## 2025-07-05 PROCEDURE — 85610 PROTHROMBIN TIME: CPT

## 2025-07-05 PROCEDURE — 84100 ASSAY OF PHOSPHORUS: CPT

## 2025-07-05 PROCEDURE — 250N000012 HC RX MED GY IP 250 OP 636 PS 637: Performed by: STUDENT IN AN ORGANIZED HEALTH CARE EDUCATION/TRAINING PROGRAM

## 2025-07-05 PROCEDURE — 83735 ASSAY OF MAGNESIUM: CPT

## 2025-07-05 PROCEDURE — 120N000011 HC R&B TRANSPLANT UMMC

## 2025-07-05 PROCEDURE — 84155 ASSAY OF PROTEIN SERUM: CPT

## 2025-07-05 PROCEDURE — 93306 TTE W/DOPPLER COMPLETE: CPT

## 2025-07-05 PROCEDURE — 250N000013 HC RX MED GY IP 250 OP 250 PS 637

## 2025-07-05 PROCEDURE — 93306 TTE W/DOPPLER COMPLETE: CPT | Mod: 26 | Performed by: INTERNAL MEDICINE

## 2025-07-05 PROCEDURE — 36415 COLL VENOUS BLD VENIPUNCTURE: CPT

## 2025-07-05 PROCEDURE — 85014 HEMATOCRIT: CPT

## 2025-07-05 PROCEDURE — 250N000013 HC RX MED GY IP 250 OP 250 PS 637: Performed by: STUDENT IN AN ORGANIZED HEALTH CARE EDUCATION/TRAINING PROGRAM

## 2025-07-05 PROCEDURE — 250N000011 HC RX IP 250 OP 636

## 2025-07-05 PROCEDURE — 85520 HEPARIN ASSAY: CPT

## 2025-07-05 RX ORDER — WARFARIN SODIUM 6 MG/1
6 TABLET ORAL
Status: COMPLETED | OUTPATIENT
Start: 2025-07-05 | End: 2025-07-05

## 2025-07-05 RX ADMIN — DROXIDOPA 100 MG: 100 CAPSULE ORAL at 07:45

## 2025-07-05 RX ADMIN — TACROLIMUS 0.5 MG: 0.5 CAPSULE ORAL at 18:01

## 2025-07-05 RX ADMIN — ROPINIROLE HYDROCHLORIDE 0.5 MG: 0.5 TABLET, FILM COATED ORAL at 07:45

## 2025-07-05 RX ADMIN — PANTOPRAZOLE SODIUM 40 MG: 40 TABLET, DELAYED RELEASE ORAL at 19:44

## 2025-07-05 RX ADMIN — MIDODRINE HYDROCHLORIDE 20 MG: 5 TABLET ORAL at 15:52

## 2025-07-05 RX ADMIN — TACROLIMUS 0.5 MG: 0.5 CAPSULE ORAL at 07:45

## 2025-07-05 RX ADMIN — MIDODRINE HYDROCHLORIDE 20 MG: 5 TABLET ORAL at 07:46

## 2025-07-05 RX ADMIN — OXYCODONE HYDROCHLORIDE 5 MG: 5 TABLET ORAL at 00:52

## 2025-07-05 RX ADMIN — ASPIRIN 81 MG CHEWABLE TABLET 81 MG: 81 TABLET CHEWABLE at 07:45

## 2025-07-05 RX ADMIN — OXYCODONE HYDROCHLORIDE 5 MG: 5 TABLET ORAL at 11:55

## 2025-07-05 RX ADMIN — WARFARIN SODIUM 6 MG: 6 TABLET ORAL at 18:01

## 2025-07-05 RX ADMIN — ROPINIROLE HYDROCHLORIDE 0.5 MG: 0.5 TABLET, FILM COATED ORAL at 19:44

## 2025-07-05 RX ADMIN — ACETAMINOPHEN 650 MG: 325 TABLET ORAL at 18:01

## 2025-07-05 RX ADMIN — DROXIDOPA 100 MG: 100 CAPSULE ORAL at 19:44

## 2025-07-05 RX ADMIN — LACOSAMIDE 50 MG: 50 TABLET, FILM COATED ORAL at 07:45

## 2025-07-05 RX ADMIN — OXYCODONE HYDROCHLORIDE 5 MG: 5 TABLET ORAL at 05:10

## 2025-07-05 RX ADMIN — HEPARIN SODIUM 1850 UNITS/HR: 10000 INJECTION, SOLUTION INTRAVENOUS at 19:44

## 2025-07-05 RX ADMIN — OXYCODONE HYDROCHLORIDE 5 MG: 5 TABLET ORAL at 21:57

## 2025-07-05 RX ADMIN — DROXIDOPA 100 MG: 100 CAPSULE ORAL at 15:52

## 2025-07-05 RX ADMIN — Medication 5 MG: at 21:57

## 2025-07-05 RX ADMIN — GABAPENTIN 300 MG: 300 CAPSULE ORAL at 19:44

## 2025-07-05 RX ADMIN — PANTOPRAZOLE SODIUM 40 MG: 40 TABLET, DELAYED RELEASE ORAL at 07:45

## 2025-07-05 RX ADMIN — OXYCODONE HYDROCHLORIDE 5 MG: 5 TABLET ORAL at 15:57

## 2025-07-05 ASSESSMENT — ACTIVITIES OF DAILY LIVING (ADL)
ADLS_ACUITY_SCORE: 42
ADLS_ACUITY_SCORE: 39
ADLS_ACUITY_SCORE: 39
ADLS_ACUITY_SCORE: 42
ADLS_ACUITY_SCORE: 39
ADLS_ACUITY_SCORE: 42
ADLS_ACUITY_SCORE: 38
ADLS_ACUITY_SCORE: 42
ADLS_ACUITY_SCORE: 39
ADLS_ACUITY_SCORE: 39
ADLS_ACUITY_SCORE: 38
ADLS_ACUITY_SCORE: 39
ADLS_ACUITY_SCORE: 42
ADLS_ACUITY_SCORE: 39

## 2025-07-05 NOTE — PLAN OF CARE
"Hours of Care: 2603-9841     Neuro: A&O x4, denied dizziness. Call light appropriate  Respiratory:  RA, lung sounds diminished at bases  Cardiac:  Had 1+ edema generalized.   GI: Denied nausea, bowel sounds audible. LBM 7/4.   Diet: Reg w/ 1200ml FR  : Had good urine output, see I&O flowsheet. Uses urinal  Skin: See PCS for assessment and treatment of wounds and surgical incisions.   VS: HR 80s, SBP 90s, SaO2 >90% on RA.    Drips:  Heparin gtt continued at 1850u/hr.    Pain: Reported 7/10 pain in L fistula arm, was given PRN 2.5mg oxycodone at 0100.   Mobility: SBA w/ walker    Events: gave PRN x2 oxycodone for pain. Heparin w/in range, continued as 1850u/hr    Plan:  Continue to monitor pain, VS, heart rhythm,fluid status, bowel status, cardiac and respiratory status.  Notify care team of changes in patient condition or other concerns. Expected ECHO possibly today.      Goal Outcome Evaluation:    Plan of Care Reviewed With: patient    Overall Patient Progress: improvingOverall Patient Progress: improving  Problem: Adult Inpatient Plan of Care  Goal: Plan of Care Review  Description: The Plan of Care Review/Shift note should be completed every shift.  The Outcome Evaluation is a brief statement about your assessment that the patient is improving, declining, or no change.  This information will be displayed automatically on your shift  note.  Outcome: Progressing  Flowsheets (Taken 7/5/2025 0214)  Plan of Care Reviewed With: patient  Overall Patient Progress: improving  Goal: Patient-Specific Goal (Individualized)  Description: You can add care plan individualizations to a care plan. Examples of Individualization might be:  \"Parent requests to be called daily at 9am for status\", \"I have a hard time hearing out of my right ear\", or \"Do not touch me to wake me up as it startles  me\".  Outcome: Progressing  Goal: Absence of Hospital-Acquired Illness or Injury  Outcome: Progressing  Intervention: Identify and Manage " Fall Risk  Flowsheets  Taken 7/5/2025 0214  Safety Promotion/Fall Prevention:   activity supervised   clutter free environment maintained   increased rounding and observation   increase visualization of patient   lighting adjusted   nonskid shoes/slippers when out of bed   room near nurse's station   room organization consistent   safety round/check completed  Taken 7/5/2025 0000  Safety Promotion/Fall Prevention:   activity supervised   clutter free environment maintained   increased rounding and observation   increase visualization of patient   lighting adjusted   nonskid shoes/slippers when out of bed   room near nurse's station   room organization consistent   safety round/check completed  Intervention: Prevent Skin Injury  Flowsheets  Taken 7/5/2025 0214  Body Position: position changed independently  Skin Protection:   adhesive use limited   transparent dressing maintained   tubing/devices free from skin contact  Taken 7/5/2025 0052  Body Position: position changed independently  Taken 7/5/2025 0000  Body Position: position changed independently  Skin Protection:   adhesive use limited   transparent dressing maintained   tubing/devices free from skin contact  Intervention: Prevent and Manage VTE (Venous Thromboembolism) Risk  Flowsheets  Taken 7/5/2025 0214  VTE Prevention/Management: SCDs off (sequential compression devices)  Taken 7/5/2025 0000  VTE Prevention/Management: SCDs off (sequential compression devices)  Intervention: Prevent Infection  Flowsheets  Taken 7/5/2025 0214  Infection Prevention:   cohorting utilized   hand hygiene promoted   rest/sleep promoted   single patient room provided  Taken 7/5/2025 0000  Infection Prevention:   cohorting utilized   hand hygiene promoted   rest/sleep promoted   single patient room provided  Goal: Optimal Comfort and Wellbeing  Outcome: Progressing  Intervention: Monitor Pain and Promote Comfort  Recent Flowsheet Documentation  Taken 7/5/2025 0052 by Ara Hatfield  OSMIN RN  Pain Management Interventions: medication (see MAR)  Intervention: Provide Person-Centered Care  Flowsheets  Taken 7/5/2025 0214  Trust Relationship/Rapport:   care explained   choices provided   empathic listening provided   questions answered   questions encouraged  Taken 7/5/2025 0000  Trust Relationship/Rapport:   care explained   choices provided   empathic listening provided   questions answered   questions encouraged  Goal: Readiness for Transition of Care  Outcome: Progressing     Problem: IP NDI OP/OBS DISCHARGE CHECKLIST  Goal: Returns to baseline functional status  Outcome: Progressing  Goal: Vital signs normal or at patient baseline  Outcome: Progressing  Goal: Diagnostic tests/labs and consults completed and resulted  Outcome: Progressing  Goal: Adequate pain control on oral analgesics  Outcome: Progressing  Goal: Tolerating oral antibiotics or has plans for home infusion setup  Outcome: Progressing  Goal: TIA: Free from ACUTE neuro deficits  Outcome: Progressing  Goal: SYNCOPY: No further episodes of syncope and any new arrhythmia addressed with controlled heart rates  Outcome: Progressing  Goal: Infection is improving  Outcome: Progressing  Goal: Dyspnea improved and O2 sats greater than 88% on room air or prior home oxygen levels  Outcome: Progressing     Problem: Risk for Delirium  Goal: Optimal Coping  Outcome: Progressing  Intervention: Optimize Psychosocial Adjustment to Delirium  Flowsheets  Taken 7/5/2025 0214  Family/Support System Care: self-care encouraged  Taken 7/5/2025 0000  Family/Support System Care: self-care encouraged  Goal: Improved Behavioral Control  Outcome: Progressing  Intervention: Minimize Safety Risk  Flowsheets  Taken 7/5/2025 0214  Trust Relationship/Rapport:   care explained   choices provided   empathic listening provided   questions answered   questions encouraged  Taken 7/5/2025 0000  Enhanced Safety Measures:   pain management   room near unit station  [facility]  Relationship/Rapport:   care explained   choices provided   empathic listening provided   questions answered   questions encouraged  Goal: Improved Attention and Thought Clarity  Outcome: Progressing  Intervention: Maximize Cognitive Function  Flowsheets  Taken 7/5/2025 0214  Sensory Stimulation Regulation:   care clustered   lighting decreased   auditory stimulation minimized   tactile stimulation minimized   visual stimulation minimized  Reorientation Measures:   calendar in view   clock in view  Taken 7/5/2025 0000  Sensory Stimulation Regulation:   care clustered   lighting decreased   auditory stimulation minimized   tactile stimulation minimized   visual stimulation minimized  Reorientation Measures:   calendar in view   clock in view  Goal: Improved Sleep  Outcome: Progressing  Intervention: Promote Sleep  Flowsheets  Taken 7/5/2025 0214  Sleep/Rest Enhancement:   awakenings minimized   comfort measures   medication   noise level reduced   regular sleep/rest pattern promoted   relaxation techniques promoted   room darkened  Taken 7/5/2025 0000  Sleep/Rest Enhancement:   awakenings minimized   comfort measures   medication   noise level reduced   regular sleep/rest pattern promoted   relaxation techniques promoted   room darkened     Problem: Hemodialysis  Goal: Safe, Effective Therapy Delivery  Outcome: Progressing  Intervention: Optimize Device Care and Function  Flowsheets  Taken 7/5/2025 0214  Medication Review/Management: medications reviewed  Taken 7/5/2025 0000  Medication Review/Management: medications reviewed  Goal: Effective Tissue Perfusion  Outcome: Progressing  Goal: Absence of Infection Signs and Symptoms  Outcome: Progressing  Intervention: Prevent or Manage Infection  Flowsheets  Taken 7/5/2025 0214  Infection Prevention:   cohorting utilized   hand hygiene promoted   rest/sleep promoted   single patient room provided  Infection Management: aseptic technique maintained  Taken 7/5/2025  0000  Infection Prevention:   cohorting utilized   hand hygiene promoted   rest/sleep promoted   single patient room provided  Infection Management: aseptic technique maintained

## 2025-07-05 NOTE — PROGRESS NOTES
[facility]    Medicine Progress Note - Medicine Service, MANUEL TEAM 4    Date of Admission:  6/27/2025    Assessment & Plan       Blanco Osborne is a 60 year old male admitted on 6/27/2025. 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 He was admitted to ICU for pressors requirements (septic vs. Hypovolemic shock) found to have PE now on heparin gtt while bridging to warfarin, overall doing well.       # Clot in dialysis fistula s/p thrombectomy and fistula aneurysm repair 6/24/25 with HD tunneled line in place  # ESRD on hemodialysis , MWF  # Hypotension with hemodialysis     Pt has required hemodialysis since 11/2022 due to an acute kidney injury during a hospitalization for respiratory failure and sepsis. Per review, HD is difficult for him to handle given hypotension- requires midodrine with HD. Currently being evaluated at [place] for kidney transplant. Dry weight list as 89.5kg, down to 89.3 kg yesterday.  Suspect as close to euvolemia as possible despite some ongoing ascites.       - Nephrology consulted, HD planned next for 7/7; probably do as outpatient   - Midodrine 20mg TID  - Droxidopa TID  -PRN tylenol   -PRN oxycodone 5mg Q4H     # AHRF  # Pulmonary Embolism  #Edema with pleural effusions   #Pneumonia     Likely multifactorial in setting of presumed pneumonia, edema and new pulmonary embolism and now all improving.    - continue warfarin for at least 3 months, VKA preferred given transplant waiting status.   - repeat echocardiogram today  - consider cardiac rehab at discharge   -Bridge with heparin, will need to remain inpatient until therapeutic, INR 1.6 today  -Ceftriaxone 6/30- Plan for 7 total days, end tomorrow.   - Follow  S/p nephrostomy in place 7/26  Cultures positive for ESBL  Complete meropenem 8/9  Follow up with urology for possible nephrectomy    cultures NGTD        # Tricuspid Insufficiency  #Pulmonary hypertension     New on echocardiogram 6/27/25, as compared to previous study 1/11/24. Appears functional in the setting of PE with  RV with normal size and borderline reduced function. Moderate to severe tricuspid regurge possible 2/2 volume overload.  - Repeat echocardiogram today.      #Pancytopenia   # Chronic macrocytic anemia     On 6/30 developed pancytopenia of unclear etiology.  Do suspect  degree of anemia from end stage renal disease and also anemia and thrombocytopenia from hypersplenism; consider effect from piperacillin/tazobactam as well.  HIT screen negative and counts overall stable to improved.    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 He has had ascites now several years requiring intermittent paracenteses, ascites present on ultrasound exam again today. Per report, pt has a number of features consistent with clinically significant portal hypertension. He had liver biopsy in our system 4/09/2025 showed severe hepatitis with many plasma cells, activity grade 3-4 /4; likely mild fibrosis. Hepatology suspects that NRH is contributing factor to his underlying portal hypertension and likely ascites will not be eliminated by hemodialysis.  Positively, no signs of EV with EGD on 7/3.    - Continue PTA tacrolimus: 1mg BID               - tacro level in AM  - SBP management: on ceftriaxone for now  - Has hx SBP: last culture data visible is in 2022, lactobacillus  - given normal synthetic function but possible portal hypertension will need to discuss role for spontaneous bacterial peritonitis prophylaxis.     - ethyl alcohol level (while no  "history of active use, some notes point out alcohol use post transplant. AST 3x the level of ALT as well, plus evidence of fibrosis and hepatitis on liver biopsy) -- PETH consistent with moderate use, will continue to follow.     --- Chronic Medical Problems ----     #Hx CVA  - PTA ASA 81mg       #RLS  - PTA ropinirole 0.5mg BID     #Hx seizures  - Continue lacosamide 50mg daily (of note, Jacobo note states that pt takes every other day as he is being \"weaned off\" by his neurologist\". Per 10/24 note review, neurology advised pt to stay on this medication).   - Lacosamide level at goal      #CHRISTIAN  Unclear if uses CPAP at home, defer to outpatient team.      #GERD  - PTA PPI                  Diet: Fluid restriction 1200 ML FLUID  Snacks/Supplements Adult: Nepro Oral Supplement; With Meals  Regular Diet Adult    DVT Prophylaxis: Warfarin  Nixon Catheter: Not present  Lines: PRESENT      CVC Double Lumen Right Internal jugular Tunneled-Site Assessment: WDL      Cardiac Monitoring: None  Code Status: Full Code      Clinically Significant Risk Factors         # Hyponatremia: Lowest Na = 130 mmol/L in last 2 days, will monitor as appropriate  # Hypochloremia: Lowest Cl = 94 mmol/L in last 2 days, will monitor as appropriate      # Hypoalbuminemia: Lowest albumin = 3.4 g/dL at 6/30/2025  6:34 AM, will monitor as appropriate   # Thrombocytopenia: Lowest platelets = 86 in last 2 days, will monitor for bleeding   # Hypertension: Noted on problem list            # Overweight: Estimated body mass index is 26.7 kg/m  as calculated from the following:    Height as of 6/24/25: 1.829 m (6').    Weight as of this encounter: 89.3 kg (196 lb 13.9 oz).      # Financial/Environmental Concerns: none         Social Drivers of Health    Alcohol Use: Unknown (10/7/2019)    AUDIT-C     Frequency of Alcohol Consumption: Monthly or less   Physical Activity: Insufficiently Active (4/18/2025)    Received from [facility]    Exercise Vital Sign "     Days of Exercise per Week: 2 days     Minutes of Exercise per Session: 10 min          Disposition Plan       Medically Ready for Discharge: Anticipated Tomorrow             Arpit Weber MD  [facility], [facility]  [facility]  Securely message with [employer] (more info)  Text page via [facility] Paging/Directory   See signed in provider for up to date coverage information  ______________________________________________________________________    Interval History   Feeling well today, hemodialysis went well with 2 liters removed.  No complaints today.  Excited to go home soon.  Family at bedside and questions answered.     Physical Exam   Vital Signs: Temp: 97.7  F (36.5  C) Temp src: Oral BP: (S) (!) 86/60 (paged Dr gay.) Pulse: 67   Resp: 16 SpO2: 98 % O2 Device: None (Room air)    Weight: 196 lbs 13.93 oz    General Appearance: Chronically ill but comfortable   Respiratory: clear to auscultation bilaterally with good air entry   Cardiovascular: II/VI holosystolic murmur at llsb   GI: distended but soft, well healed scar.  POCUS with moderate ascites.   Skin: no rash  Other: No lower extremity edema.      Medical Decision Making             Data     I have personally reviewed the following data over the past 24 hrs:    3.7 (L)  \   8.7 (L)   / 96 (L)     132 (L) 94 (L) 20.0 /  98   3.7 27 3.57 (H) \     ALT: 5 AST: 17 AP: 126 TBILI: 0.5   ALB: 4.0 TOT PROTEIN: 7.2 LIPASE: N/A     INR:  1.64 (H) PTT:  N/A   D-dimer:  N/A Fibrinogen:  N/A       Imaging results reviewed over the past 24 hrs:   No results found for this or any previous visit (from the past 24 hours).

## 2025-07-05 NOTE — PLAN OF CARE
Goal Outcome Evaluation:      Plan of Care Reviewed With: patient    Overall Patient Progress: improvingOverall Patient Progress: improving    Outcome Evaluation: heparin infusing    Vitals: soft bp, room air. BP 95/63 (BP Location: Right arm)   Pulse 68   Temp 97.7  F (36.5  C) (Oral)   Resp 16   Wt 90.4 kg (199 lb 3.2 oz)   SpO2 98%   BMI 27.02 kg/m      Neuro : a x o x 4.   Blood glucose: none  Pain/nausea: fistula LUE pain. Managed with oxy.   Diet: regular. FR 1200 ml.   Lines: PIV RUE x 3 infusing heparin @ 1850 units per hr, xa due am labs.   : anuric. On HD, last dialyzed yesterday.   GI: x 1.   Drains: none.   Skin: LUE fistula dressing cdi. LUE bruises.  Mobility: up ad kaelyn x iv pole.   Echo completed.

## 2025-07-06 ENCOUNTER — APPOINTMENT (OUTPATIENT)
Dept: OCCUPATIONAL THERAPY | Facility: CLINIC | Age: 60
DRG: 175 | End: 2025-07-06
Attending: INTERNAL MEDICINE
Payer: COMMERCIAL

## 2025-07-06 VITALS
TEMPERATURE: 97.5 F | WEIGHT: 201.5 LBS | BODY MASS INDEX: 27.33 KG/M2 | RESPIRATION RATE: 18 BRPM | OXYGEN SATURATION: 98 % | SYSTOLIC BLOOD PRESSURE: 101 MMHG | HEART RATE: 68 BPM | DIASTOLIC BLOOD PRESSURE: 66 MMHG

## 2025-07-06 PROBLEM — I26.99 ACUTE PULMONARY EMBOLISM, UNSPECIFIED PULMONARY EMBOLISM TYPE, UNSPECIFIED WHETHER ACUTE COR PULMONALE PRESENT (H): Status: ACTIVE | Noted: 2025-07-06

## 2025-07-06 LAB
ANION GAP SERPL CALCULATED.3IONS-SCNC: 13 MMOL/L (ref 7–15)
BUN SERPL-MCNC: 35.3 MG/DL (ref 8–23)
CALCIUM SERPL-MCNC: 9.2 MG/DL (ref 8.8–10.4)
CHLORIDE SERPL-SCNC: 95 MMOL/L (ref 98–107)
CREAT SERPL-MCNC: 5.41 MG/DL (ref 0.67–1.17)
EGFRCR SERPLBLD CKD-EPI 2021: 11 ML/MIN/1.73M2
GLUCOSE SERPL-MCNC: 102 MG/DL (ref 70–99)
HCO3 SERPL-SCNC: 22 MMOL/L (ref 22–29)
INR PPP: 1.92 (ref 0.85–1.15)
INR PPP: 1.93 (ref 0.85–1.15)
MAGNESIUM SERPL-MCNC: 1.9 MG/DL (ref 1.7–2.3)
PHOSPHATE SERPL-MCNC: 4.4 MG/DL (ref 2.5–4.5)
POTASSIUM SERPL-SCNC: 3.6 MMOL/L (ref 3.4–5.3)
PROTHROMBIN TIME: 22.1 SECONDS (ref 11.8–14.8)
PROTHROMBIN TIME: 22.2 SECONDS (ref 11.8–14.8)
SODIUM SERPL-SCNC: 130 MMOL/L (ref 135–145)
UFH PPP CHRO-ACNC: 0.33 IU/ML (ref ?–1.1)

## 2025-07-06 PROCEDURE — 250N000013 HC RX MED GY IP 250 OP 250 PS 637: Performed by: STUDENT IN AN ORGANIZED HEALTH CARE EDUCATION/TRAINING PROGRAM

## 2025-07-06 PROCEDURE — 36415 COLL VENOUS BLD VENIPUNCTURE: CPT | Performed by: STUDENT IN AN ORGANIZED HEALTH CARE EDUCATION/TRAINING PROGRAM

## 2025-07-06 PROCEDURE — 85610 PROTHROMBIN TIME: CPT

## 2025-07-06 PROCEDURE — 97535 SELF CARE MNGMENT TRAINING: CPT | Mod: GO

## 2025-07-06 PROCEDURE — 99233 SBSQ HOSP IP/OBS HIGH 50: CPT | Mod: GC | Performed by: STUDENT IN AN ORGANIZED HEALTH CARE EDUCATION/TRAINING PROGRAM

## 2025-07-06 PROCEDURE — 250N000012 HC RX MED GY IP 250 OP 636 PS 637: Performed by: STUDENT IN AN ORGANIZED HEALTH CARE EDUCATION/TRAINING PROGRAM

## 2025-07-06 PROCEDURE — 250N000013 HC RX MED GY IP 250 OP 250 PS 637

## 2025-07-06 PROCEDURE — 83735 ASSAY OF MAGNESIUM: CPT

## 2025-07-06 PROCEDURE — 84100 ASSAY OF PHOSPHORUS: CPT

## 2025-07-06 PROCEDURE — 250N000011 HC RX IP 250 OP 636

## 2025-07-06 PROCEDURE — 85520 HEPARIN ASSAY: CPT | Performed by: SURGERY

## 2025-07-06 PROCEDURE — 99239 HOSP IP/OBS DSCHRG MGMT >30: CPT | Mod: GC | Performed by: STUDENT IN AN ORGANIZED HEALTH CARE EDUCATION/TRAINING PROGRAM

## 2025-07-06 PROCEDURE — 36415 COLL VENOUS BLD VENIPUNCTURE: CPT

## 2025-07-06 PROCEDURE — 80048 BASIC METABOLIC PNL TOTAL CA: CPT | Performed by: STUDENT IN AN ORGANIZED HEALTH CARE EDUCATION/TRAINING PROGRAM

## 2025-07-06 RX ORDER — MIDODRINE HYDROCHLORIDE 10 MG/1
10 TABLET ORAL DAILY PRN
Qty: 30 TABLET | Refills: 0 | Status: SHIPPED | OUTPATIENT
Start: 2025-07-06

## 2025-07-06 RX ORDER — LACOSAMIDE 50 MG/1
50 TABLET ORAL DAILY
Qty: 30 TABLET | Refills: 0 | Status: ACTIVE | OUTPATIENT
Start: 2025-07-07

## 2025-07-06 RX ORDER — OXYCODONE HYDROCHLORIDE 5 MG/1
5 TABLET ORAL EVERY 4 HOURS PRN
Qty: 20 TABLET | Refills: 0 | Status: SHIPPED | OUTPATIENT
Start: 2025-07-06 | End: 2025-07-10

## 2025-07-06 RX ORDER — LACOSAMIDE 50 MG/1
50 TABLET ORAL DAILY
Qty: 30 TABLET | Refills: 0 | Status: CANCELLED | OUTPATIENT
Start: 2025-07-07

## 2025-07-06 RX ORDER — WARFARIN SODIUM 6 MG/1
6 TABLET ORAL
Status: COMPLETED | OUTPATIENT
Start: 2025-07-06 | End: 2025-07-06

## 2025-07-06 RX ORDER — TACROLIMUS 0.5 MG/1
0.5 CAPSULE ORAL 2 TIMES DAILY
Qty: 60 CAPSULE | Refills: 0 | Status: ACTIVE | OUTPATIENT
Start: 2025-07-06 | End: 2025-07-08

## 2025-07-06 RX ORDER — DROXIDOPA 100 MG/1
100 CAPSULE ORAL 3 TIMES DAILY
Qty: 90 CAPSULE | Refills: 0 | Status: SHIPPED | OUTPATIENT
Start: 2025-07-06 | End: 2025-07-15

## 2025-07-06 RX ORDER — OXYCODONE HYDROCHLORIDE 5 MG/1
5 TABLET ORAL EVERY 6 HOURS PRN
Qty: 20 TABLET | Refills: 0 | Status: CANCELLED | OUTPATIENT
Start: 2025-07-06

## 2025-07-06 RX ORDER — MIDODRINE HYDROCHLORIDE 10 MG/1
20 TABLET ORAL EVERY 8 HOURS
Qty: 180 TABLET | Refills: 0 | Status: SHIPPED | OUTPATIENT
Start: 2025-07-06 | End: 2025-07-15

## 2025-07-06 RX ORDER — ROPINIROLE 0.5 MG/1
0.5 TABLET, FILM COATED ORAL 2 TIMES DAILY
Qty: 60 TABLET | Refills: 0 | Status: SHIPPED | OUTPATIENT
Start: 2025-07-06 | End: 2025-07-10

## 2025-07-06 RX ADMIN — OXYCODONE HYDROCHLORIDE 5 MG: 5 TABLET ORAL at 08:46

## 2025-07-06 RX ADMIN — ACETAMINOPHEN 650 MG: 325 TABLET ORAL at 05:51

## 2025-07-06 RX ADMIN — ROPINIROLE HYDROCHLORIDE 0.5 MG: 0.5 TABLET, FILM COATED ORAL at 08:30

## 2025-07-06 RX ADMIN — MIDODRINE HYDROCHLORIDE 20 MG: 5 TABLET ORAL at 08:32

## 2025-07-06 RX ADMIN — TACROLIMUS 0.5 MG: 0.5 CAPSULE ORAL at 08:30

## 2025-07-06 RX ADMIN — DROXIDOPA 100 MG: 100 CAPSULE ORAL at 13:08

## 2025-07-06 RX ADMIN — OXYCODONE HYDROCHLORIDE 5 MG: 5 TABLET ORAL at 17:17

## 2025-07-06 RX ADMIN — ASPIRIN 81 MG CHEWABLE TABLET 81 MG: 81 TABLET CHEWABLE at 08:30

## 2025-07-06 RX ADMIN — DROXIDOPA 100 MG: 100 CAPSULE ORAL at 08:46

## 2025-07-06 RX ADMIN — HEPARIN SODIUM 1850 UNITS/HR: 10000 INJECTION, SOLUTION INTRAVENOUS at 08:53

## 2025-07-06 RX ADMIN — WARFARIN SODIUM 6 MG: 6 TABLET ORAL at 17:08

## 2025-07-06 RX ADMIN — MIDODRINE HYDROCHLORIDE 20 MG: 5 TABLET ORAL at 16:27

## 2025-07-06 RX ADMIN — MIDODRINE HYDROCHLORIDE 20 MG: 5 TABLET ORAL at 00:52

## 2025-07-06 RX ADMIN — PANTOPRAZOLE SODIUM 40 MG: 40 TABLET, DELAYED RELEASE ORAL at 08:30

## 2025-07-06 RX ADMIN — OXYCODONE HYDROCHLORIDE 5 MG: 5 TABLET ORAL at 13:08

## 2025-07-06 RX ADMIN — OXYCODONE HYDROCHLORIDE 5 MG: 5 TABLET ORAL at 02:57

## 2025-07-06 RX ADMIN — LACOSAMIDE 50 MG: 50 TABLET, FILM COATED ORAL at 08:30

## 2025-07-06 RX ADMIN — TACROLIMUS 0.5 MG: 0.5 CAPSULE ORAL at 17:08

## 2025-07-06 ASSESSMENT — ACTIVITIES OF DAILY LIVING (ADL)
ADLS_ACUITY_SCORE: 38

## 2025-07-06 NOTE — PLAN OF CARE
Goal Outcome Evaluation:      Plan of Care Reviewed With: patient, spouse    Overall Patient Progress: no changeOverall Patient Progress: no change         /66 (BP Location: Right arm)   Pulse 68   Temp 97.5  F (36.4  C) (Oral)   Resp 18   Wt 91.4 kg (201 lb 8 oz)   SpO2 98%   BMI 27.33 kg/m      VS: soft Bps  on RA, afebrile  Neuro: Aox4  Behaviors: calm and cooperative   Respiratory: WDL  Cardiac: WDL   Pain/Nausea: denies nausea , reports left forearm pain   PRN:oxy x2  Diet: Regular, 1.2 L fluid restriction  IV Access: RHD Line, 3 RPIV ,   Infusion(s): Heparin Drip @ 1850 units/hr  GI/: anuric, last BM yesterday  Skin: Left fore arm bruising   Mobility: Ind  Plan: possible discharge this afternoon if INR is about 2. INR recheck @ 4pm

## 2025-07-06 NOTE — PROGRESS NOTES
[facility]    Progress Note - Medicine Service, MANUEL TEAM 4       Date of Admission:  6/27/2025    Assessment & Plan   Blanco Osborne is a 60 year old male admitted on 6/27/2025. 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 He was admitted to ICU for pressors requirements (septic vs. Hypovolemic shock) found to have PE now on heparin gtt while bridging to warfarin, overall doing well.       # Clot in dialysis fistula s/p thrombectomy and fistula aneurysm repair 6/24/25 with HD tunneled line in place  # ESRD on hemodialysis , MWF  # Hypotension with hemodialysis     Pt has required hemodialysis since 11/2022 due to an acute kidney injury during a hospitalization for respiratory failure and sepsis. Per review, HD is difficult for him to handle given hypotension- requires midodrine with HD. Currently being evaluated at [place] for kidney transplant. Dry weight list as 89.5kg, down to 89.3 kg yesterday.  Suspect as close to euvolemia as possible despite some ongoing ascites.       - Nephrology consulted, HD planned next for 7/7; probably do as outpatient   - Midodrine 20mg TID  - Droxidopa TID  -PRN tylenol   -PRN oxycodone 5mg Q4H     # AHRF  # Pulmonary Embolism  #Edema with pleural effusions   #Pneumonia     Likely multifactorial in setting of presumed pneumonia, edema and new pulmonary embolism and now all improving. Will plan to discharge once INR therapeutic.     - continue warfarin for at least 3 months, VKA preferred given transplant waiting status.   - consider cardiac rehab at discharge   -Bridge with heparin, will need to remain inpatient until therapeutic, INR 1.93 today  -Ceftriaxone X4 days  - Follow cultures NGTD        #  Tricuspid Insufficiency  #Pulmonary hypertension     New on echocardiogram 6/27/25, as compared to previous study 1/11/24. Appears functional in the setting of PE with  RV with normal size and borderline reduced function. Moderate to severe tricuspid regurge possible 2/2 volume overload.Repeat Echo showed normal heart function, no pulmonary hypertension or TR.    #Pancytopenia   # Chronic macrocytic anemia     On 6/30 developed pancytopenia of unclear etiology.  Do suspect  degree of anemia from end stage renal disease and also anemia and thrombocytopenia from hypersplenism; consider effect from piperacillin/tazobactam as well.  HIT screen negative and counts overall stable to improved.    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 He has had ascites now several years requiring intermittent paracenteses, ascites present on ultrasound exam again today. Per report, pt has a number of features consistent with clinically significant portal hypertension. He had liver biopsy in our system 4/09/2025 showed severe hepatitis with many plasma cells, activity grade 3-4 /4; likely mild fibrosis. Hepatology suspects that NRH is contributing factor to his underlying portal hypertension and likely ascites will not be eliminated by hemodialysis.  Positively, no signs of EV with EGD on 7/3.    - Continue PTA tacrolimus: 1mg BID               - tacro level in AM  - SBP management: on ceftriaxone for now  - Has hx SBP: last culture data visible is in 2022, lactobacillus  - given normal synthetic function but possible portal hypertension will need to discuss role for spontaneous bacterial peritonitis prophylaxis.     - ethyl alcohol level (while no history of active use,  "some notes point out alcohol use post transplant. AST 3x the level of ALT as well, plus evidence of fibrosis and hepatitis on liver biopsy) -- PETH consistent with moderate use, will continue to follow.     --- Chronic Medical Problems ----     #Hx CVA  - PTA ASA 81mg       #RLS  - PTA ropinirole 0.5mg BID     #Hx seizures  - Continue lacosamide 50mg daily (of note, Jacobo note states that pt takes every other day as he is being \"weaned off\" by his neurologist\". Per 10/24 note review, neurology advised pt to stay on this medication).   - Lacosamide level at goal      #CHRISTIAN  Unclear if uses CPAP at home, defer to outpatient team.      #GERD  - PTA PPI                 Diet: Fluid restriction 1200 ML FLUID  Snacks/Supplements Adult: Nepro Oral Supplement; With Meals  Regular Diet Adult  Snacks/Supplements Pediatric: Ensure Clear; Between Meals    DVT Prophylaxis: Warfarin and heparin gtt  Nixon Catheter: Not present  Fluids: none  Lines: PRESENT      CVC Double Lumen Right Internal jugular Tunneled-Site Assessment: WDL      Cardiac Monitoring: None  Code Status: Full Code      Clinically Significant Risk Factors         # Hyponatremia: Lowest Na = 130 mmol/L in last 2 days, will monitor as appropriate  # Hypochloremia: Lowest Cl = 94 mmol/L in last 2 days, will monitor as appropriate      # Hypoalbuminemia: Lowest albumin = 3.4 g/dL at 6/30/2025  6:34 AM, will monitor as appropriate   # Thrombocytopenia: Lowest platelets = 96 in last 2 days, will monitor for bleeding   # Hypertension: Noted on problem list            # Overweight: Estimated body mass index is 27.33 kg/m  as calculated from the following:    Height as of 6/24/25: 1.829 m (6').    Weight as of this encounter: 91.4 kg (201 lb 8 oz).      # Financial/Environmental Concerns: none         Social Drivers of Health   Alcohol Use: Unknown (10/7/2019)    AUDIT-C     Frequency of Alcohol Consumption: Monthly or less   Physical Activity: Insufficiently Active " (4/18/2025)    Received from [facility]    Exercise Vital Sign     Days of Exercise per Week: 2 days     Minutes of Exercise per Session: 10 min         Disposition Plan     Medically Ready for Discharge: Anticipated Todayor tomorrow pending INR       The patient's care was discussed with the Attending Physician, Dr. Weber.    Darline Hollis MD  [facility], [facility] [address] [place] [address]  Securely message with [employer] (more info)  Text page via [facility] Paging/Directory   See signed in provider for up to date coverage information  ______________________________________________________________________    Interval History   NAEO. Feeling well today, hoping to discharge this evening if INR increases. Ongoing arm pain only complaint.     Physical Exam   Vital Signs: Temp: 98  F (36.7  C) Temp src: Oral BP: 116/75 Pulse: 62   Resp: 18 SpO2: 99 % O2 Device: None (Room air)    Weight: 201 lbs 8 oz    General: awake, alert, no distress, sitting up in bed conversational  Neuro: Awake, no focal deficits, mentation intact  Pulm/Resp: On room air, Breathing comfortably, lungs clear bilat  CV: RRR, no murmur, well perfused extremities, trace edema  Abdomen: soft, distended, nontender, mild fluid shift  Skin: Bruising to the LUE (site of fistula recannulation), edges marked with pen. Ecchymosis present. Soft tissue swelling medial to elbow, no joint swelling    Medical Decision Making             Data   Recent Labs   Lab 07/06/25  0540 07/05/25  0400 07/04/25  0555 07/03/25  0530   WBC  --  3.7* 4.4 5.3   HGB  --  8.7* 8.4* 8.8*   MCV  --  108* 111* 109*   PLT  --  96* 86* 112*   INR 1.93* 1.64* 1.38* 1.23*   * 132* 130* 135   POTASSIUM 3.6 3.7 3.6 3.8   CHLORIDE 95* 94* 94* 96*   CO2 22 27 23 22   BUN 35.3* 20.0 36.9* 25.4*   CR 5.41* 3.57* 5.66* 4.05*   ANIONGAP 13 11 13 17*   KELLY 9.2 9.3 9.6 9.4   * 98 98 100*   ALBUMIN  --  4.0 3.9 4.1   PROTTOTAL  --  7.2 7.0 7.3    BILITOTAL  --  0.5 0.4 0.6   ALKPHOS  --  126 124 132   ALT  --  5 6 8   AST  --  17 17 20     INR   Date Value Ref Range Status   07/06/2025 1.93 (H) 0.85 - 1.15 Final   10/07/2019 1.20 (H) 0.86 - 1.14 Final

## 2025-07-06 NOTE — PHARMACY-ANTICOAGULATION SERVICE
Clinical Pharmacy- Warfarin Discharge Note  This patient is currently on warfarin for the treatment of PE.  INR Goal= 2-3  Expected length of therapy undetermined.    Warfarin PTA Regimen: 8 mg Mon/Fri, 6 mg ROW    Anticoagulation Dose History  More data exists         Latest Ref Rng & Units 6/30/2025 7/1/2025 7/2/2025 7/3/2025 7/4/2025 7/5/2025 7/6/2025   Recent Dosing and Labs   warfarin ANTICOAGULANT (COUMADIN/JANTOVEN) 4 MG tablet - - 4 mg, $Given - 8 mg, $Given - - -   warfarin ANTICOAGULANT (COUMADIN/JANTOVEN) 6 MG tablet - - - 6 mg, $Given - 6 mg, $Given 6 mg, $Given -   INR 0.85 - 1.15 1.34  1.28  1.24  1.23  1.38  1.64  1.93        Vitamin K doses administered during the last 7 days: none  FFP administered during the last 7 days: none    Recommend:   Discharging the patient on a warfarin regimen of 6 mg daily except 8 mg on Monday and Friday.   Discharge with a prescription for warfarin 2mg tablets.    INR recheck Monday, Tuesday or Wednesday of this week (7/7-7/9).    Shirley Cormier, Pharm.D., BCPS, BCTXP  [facility] 7a pharmacist

## 2025-07-06 NOTE — DISCHARGE SUMMARY
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    Weight as of this encounter: 91.4 kg (201 lb 8 oz).       Follow-ups Needed After Discharge   PCP follow up within 1 week for hospital follow up. Please address ongoing pain needs at that time. Check in on BP. Goal SBP>90 if asymptomatic.     Connect with anticoagulation clinic wihtin 1-2 days to check INR and adjust warfarin as needed.     Schedule appointment with Neurology within 3 months to discuss lacosamide regimen.    Discharge Disposition   Discharged to home  Condition at discharge: Good    Hospital Course   Blanco Osborne was admitted on 6/27/2025 for acute hypoxic respiratory failure found to have a pulmonary embolism and pneumonia.  The following problems were addressed during his hospitalization:    # Acute hypoxic respiratory failure secondary to pulmonary embolism  #Bilateral pleural effusions   #Pneumonia   Presented with hypoxia, new O2  requirement. Underwent ultrafiltration given elevated BNP and bilateral pleural effusions. CT PE positive for acute pulmonary embolism in left upper lobe. Likely multifactorial in setting of presumed pneumonia, edema and new pulmonary embolism and now all improving. Was started on heparin drip treated with 7 total days of antibiotics (pipercillin-tazobactam followed by ceftriaxone). Warfarin was started and bridged with heparin with goal INR 2-3. At time of discharge patient's INR was 1.92. Counseled patient on the risk of discharging home prior to being on therapeutic warfarin. Patient understood and accepted risks. Plan for close follow up with anticoagulation clinic.        # ESRD on hemodialysis , MWF   Pt has required hemodialysis since 11/2022 due to an acute kidney injury during a hospitalization for respiratory failure and sepsis. Currently being evaluated at [place] for kidney transplant. Dry weight list as 89.5kg, which was achieved by time of discharge after consecutive days of ultrafiltration. At time of discharge was close to euvolemia as possible despite some ongoing ascites. Nephrology followed closely throughout this admission. Patient to continue normal MWF dialysis.       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 Fistulogram with IR 6/23 with large thrombus noted and ultimately inability to keep fistula open. Subsequently tunnel cath placed via IR 6/23 & he underwent thrombectomy with repair of fistula aneurysms with Dr Mcneil 6/24. Patient had ongoing postoperative pain in his left upper arm that was managed with gabapentin and oxycodone during this hospitalization. Patient discharged with limited amount of oxycodone.   - Tunneled HD line in plac       #Acute on chronic hypotension with dialysis  Pt has ongoing concerns  with chronic hypotension, takes midodrine with dialysis. Hypotensive after UF 6/26 despite 50mg midodrine, requiring norepinephrine. Not suspected to be shock in the setting of normal lactate, otherwise stable vitals. Continued to have ongoing hypotensive with and following ultrafiltration, prompting addition of scheduled doxidropa TID in addition to midodrine 20mg TID. Nephrology to continue to manage blood pressure outpatient with dialysis.       # Tricuspid Insufficiency, resolved  #Pulmonary hypertension , resolved  Tricuspid insufficiency, pulmonary hypertension, borderline reduced RV function new on echocardiogram 6/27/25, as compared to previous study 1/11/24. Repeat Echo showed normal heart function, no pulmonary hypertension or tricuspid insufficiency. Suspect transient changes functional in the setting of PE.       #Pancytopenia, improved  # Chronic macrocytic anemia   Developed pancytopenia  during admission.  Suspect  degree of anemia from end stage renal disease and also anemia and thrombocytopenia from hypersplenism. Consider effect from piperacillin/tazobactam as well.  HIT screen negative and counts overall stable to improved. 7 day antibiotic course completed with ceftriaxone in place of piperacillin/tazobactam. Counts improved prior to discharge.       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 He has had ascites now several years requiring intermittent paracenteses. Per report, pt has a number of features consistent with clinically significant portal hypertension. He had liver biopsy in our system 4/09/2025 showed severe hepatitis with many plasma cells, activity grade 3-4 /4; likely mild fibrosis. Hepatology consulted and suspects that nodular regenerative hyperplasia is contributing factor to his underlying  portal hypertension and likely ascites will not be eliminated by hemodialysis Underwent paracentesis this hospital stay as part of hypotension workup, negative for SBP.  Positively, no signs of esophageal varices with EGD on 7/3.     --- Chronic Medical Problems ----     #Hx CVA  Continued ASA 81mg       #RLS  Continued ropinirole 0.5mg BID     #Hx seizures  Continued lacosamide 50mg daily . Patient reported on admission that he had been taking every other day, most recent neurology note 10/24 recommended continuing daily. Recommend patient follow up with neurologist outpatient and confirm lacosamide regimen. Lacosamide level at goal this admission.       #CHRISTIAN  Unclear if uses CPAP at home, defer to outpatient team.      #GERD  Continue PPI    Consultations This Hospital Stay   PHYSICAL THERAPY ADULT IP CONSULT  OCCUPATIONAL THERAPY ADULT IP CONSULT  CONSULT FOR INPATIENT VASCULAR ACCESS CARE  GI HEPATOLOGY ADULT IP CONSULT  NEPHROLOGY ESRD ADULT IP CONSULT  PHARMACY TO DOSE WARFARIN  PHARMACY IP CONSULT  CARE MANAGEMENT / SOCIAL WORK IP CONSULT  PHARMACY TO DOSE VANCO  CONSULT FOR INPATIENT VASCULAR ACCESS CARE  GI HEPATOLOGY ADULT IP CONSULT  INTERNAL MEDICINE PROCEDURE TEAM ADULT IP CONSULT - PARACENTESIS  PHARMACY IP CONSULT  PHARMACY IP CONSULT  CONSULT FOR INPATIENT VASCULAR ACCESS CARE  PHARMACY LIAISON FOR MEDICATION COVERAGE CONSULT  PHARMACY LIAISON FOR MEDICATION COVERAGE CONSULT  PHARMACY TO DOSE WARFARIN  NUTRITION SERVICES ADULT IP CONSULT    Code Status   Full Code       The patient was discussed with Dr. Arpit Hollis MD  [facility] [address] [address]  Phone: [phone]  ______________________________________________________________________    Physical Exam   Vital Signs: Temp: 97.5  F (36.4  C) Temp src: Oral BP: 101/66 Pulse: 68   Resp: 18 SpO2: 98 % O2 Device: None (Room air)    Weight: 201 lbs 8  oz    General: awake, alert, no distress, sitting up in bed conversational  Neuro: Awake, no focal deficits, mentation intact  Pulm/Resp: On room air, Breathing comfortably, lungs clear bilaterally  CV: RRR, no murmur, well perfused extremities, trace edema  Abdomen: soft, distended, nontender, mild fluid shift  Skin: Bruising to the LUE (site of fistula recannulation), edges marked with pen. Ecchymosis present. Soft tissue swelling medial to elbow, no joint swelling      Primary Care Physician   Ki Carter    Discharge Orders   No discharge procedures on file.    Significant Results and Procedures   Most Recent 3 CBC's:  Recent Labs   Lab Test 07/07/25  0212 07/07/25  0109 07/05/25  0400 07/04/25  0555 07/03/25  0530   WBC  --   --  3.7* 4.4 5.3   HGB 9.7* 9.2* 8.7* 8.4* 8.8*   MCV  --   --  108* 111* 109*   PLT  --   --  96* 86* 112*     Most Recent 3 BMP's:  Recent Labs   Lab Test 07/07/25  0212 07/07/25  0109 07/06/25  0540 07/05/25  0400 07/04/25  0555    141 130* 132* 130*   POTASSIUM 3.9 3.5 3.6 3.7 3.6   CHLORIDE  --   --  95* 94* 94*   CO2  --   --  22 27 23   BUN  --   --  35.3* 20.0 36.9*   CR  --   --  5.41* 3.57* 5.66*   ANIONGAP  --   --  13 11 13   KELLY  --   --  9.2 9.3 9.6   * 117* 102* 98 98     Most Recent 3 INR's:  Recent Labs   Lab Test 07/06/25  1559 07/06/25  0540 07/05/25  0400   INR 1.92* 1.93* 1.64*   ,   Results for orders placed or performed during the hospital encounter of 06/27/25   POC US GUIDE FOR PARACENTESIS    Impression    US Indication:Abdominal distension    Limited abdominal ultrasound was performed and demonstrated an adequate fluid collection on the left side of the abdomen.     Doppler of the skin demonstrated an area at this site without significant vasculature.  A paracentesis at this site was subsequently performed.    NICOLE MALONE MD    CT Chest Pulmonary Embolism w Contrast     Value    Radiologist flags Pulmonary emboli in posterior segmental and  (Urgent)    Narrative    EXAM: CTA pulmonary angiogram, 6/27/2025 6:31 AM    HISTORY: Concern for PE- hypoxia, BNP elevation, hx PE not taking AC  reliably    COMPARISON: CT CAP 11/3/2024.    TECHNIQUE: Volumetric CT images obtained through the chest with  contrast. Coronal and axial MIP reformatted images obtained.  Three-dimensional (3D) post-processed angiographic images were  reconstructed, archived to PACS and used in interpretation of this  study.     CONTRAST: 68 ml isovue 370 IV.    FINDINGS:   Vascular: There is good contrast opacification of the pulmonary  arterial vasculature. Filling defects in posterior segmental and  subsegmental arterial branches in the left upper lobe. Heart is normal  size without pericardial effusion. No evidence of right heart strain.  Similar dilation of the main pulmonary artery up to 4.2 cm. No  thoracic aortic aneurysm. Bovine configuration of the great vessels.  Mild calcified plaque in the aortic arch.    Remaining Chest: Central tracheobronchial tree is patent. Dense  consolidation of the majority of the right lower lobe. Multifocal  subsegmental atelectasis and/or scarring in the right lung. Scattered  patchy consolidative opacities throughout the mid and lower right  lung. Left basilar atelectasis. No pneumothorax. Small right pleural  effusion. Pleural thickening predominantly in the superior right  hemithorax. Prominent and mildly enlarged paratracheal lymph nodes,  for example a right lower paratracheal lymph node measuring up to 1.2  cm in short axis (4/134). Stable pleural calcifications in the right  base.    Upper Abdomen: Partially visualized hyperattenuating fluid in the left  upper quadrant.    Bones/Soft Tissues: No acute abnormalities or suspicious lesions.  Chronic fracture deformities of bilateral anterior ribs and mid  sternal body. Multilevel degenerative changes of the visualized spine.  Bilateral gynecomastia.      Impression    IMPRESSION:   1. Exam is  positive for acute pulmonary embolism. Pulmonary emboli in  posterior segmental and subsegmental arterial branches in the left  upper lobe. No evidence of right heart strain.   2. Dense consolidation of the right lower lobe, likely atelectasis but  infection cannot be entirely excluded.  3. Scattered patchy consolidative opacities in the mid to lower right  lung, suggestive of an infectious and/or inflammatory process versus  aspiration.  4. Small right pleural effusion with additional pleural thickening in  the right hemithorax, predominantly in the superior component.  5. Right upper quadrant ascites.    [Urgent Result: Pulmonary emboli in posterior segmental and  subsegmental branches in the left upper lobe]    Finding was identified on 2025 at 6:34 AM.     Dr. Quinteros was contacted by Dr. Armijo on 2025 at 6:35 AM and  verbalized understanding of the urgent finding.     In the event of a positive result for acute pulmonary embolism  resulting in right heart strain, consider calling the   [facility] hospital  for PERT (Pulmonary Embolism Response Team)  Activation?    PERT -- Pulmonary Embolism Response Team (Multidisciplinary team  including cardiology, interventional radiology, critical care,  hematology)    I have personally reviewed the examination and initial interpretation  and I agree with the findings.    LUIS MIGUEL ADAMS DO         SYSTEM ID:  [id number]   Echocardiogram Complete     Value    LVEF  60-65%    Narrative    [id number]  [id number]  [id number]  [id number]^NELI^RALPH^YOLA     [facility]  Echocardiography Laboratory  [address] [place] [id number]     Name: MARY JO CRAIN  MRN: [id number]  : 1965  Study Date: 2025 07:21 AM  Age: 60 yrs  Gender: Male  Patient Location: [facility]  Reason For Study: SOB, Hypotension  Ordering Physician: RALPH QUINTEROS  Referring Physician: LELA SHEPARD  Performed By: Ruddy Da Silva RDCS     BSA: 2.2  m2  Height: 72 in  Weight: 205 lb  HR: 70  BP: 97/63 mmHg  ______________________________________________________________________________  Procedure  Echocardiogram with two-dimensional, color and spectral Doppler.  ______________________________________________________________________________  Interpretation Summary  Global and regional left ventricular function is normal with an EF of 60-65%.  Left ventricular diastolic function is normal.  Flattened septum is consistent with right ventricular pressure and volume  overload.  The right ventricle is normal size.  Global right ventricular function is borderline reduced.  Moderate to severe tricuspid insufficiency is present. TR is central and  appear functional, pulm HTN?  The right ventricular systolic pressure is 28mmHg above the right atrial  pressure.  IVC diameter and respiratory changes fall into an intermediate range  suggesting an RA pressure of 8 mmHg.  No pericardial effusion is present.     This study was compared with the study from 1/11/2024 .  TR is new, RA pressure increased.     ______________________________________________________________________________  Left Ventricle  Left ventricular size is normal. Left ventricular wall thickness is normal.  Global and regional left ventricular function is normal with an EF of 60-65%.  Left ventricular diastolic function is normal. Flattened septum is consistent  with right ventricular pressure and volume overload.     Right Ventricle  The right ventricle is normal size. Global right ventricular function is  borderline reduced.     Atria  The right atria appears normal. Moderate left atrial enlargement is present.     Mitral Valve  Mild mitral annular calcification is present.     Aortic Valve  Trileaflet aortic sclerosis without stenosis. Trace aortic insufficiency is  present.     Tricuspid Valve  Moderate to severe tricuspid insufficiency is present. The right ventricular  systolic pressure is approximated  at 28.7 mmHg plus the right atrial pressure.  The right ventricular systolic pressure is 28mmHg above the right atrial  pressure.     Pulmonic Valve  On Doppler interrogation, there is no significant stenosis or regurgitation.     Vessels  The aorta root is normal. The thoracic aorta is normal. IVC diameter and  respiratory changes fall into an intermediate range suggesting an RA pressure  of 8 mmHg.     Pericardium  No pericardial effusion is present.     Compared to Previous Study  This study was compared with the study from 2024 . TR is new, RA pressure  increased.  ______________________________________________________________________________  MMode/2D Measurements & Calculations  IVSd: 1.1 cm     LVIDd: 4.3 cm  LVIDs: 2.8 cm  LVPWd: 0.94 cm  FS: 35.1 %  LV mass(C)d: 141.5 grams  LV mass(C)dI: 65.7 grams/m2  asc Aorta Diam: 3.3 cm  LVOT diam: 2.3 cm  LVOT area: 4.3 cm2  Asc Ao diam index BSA (cm/m2): 1.5  Asc Ao diam index Ht(cm/m): 1.8  LA Volume Index (BP): 42.3 ml/m2  RWT: 0.44  TAPSE: 1.5 cm     Doppler Measurements & Calculations  MV E max baljeet: 97.2 cm/sec  MV A max baljeet: 106.0 cm/sec  MV E/A: 0.92  MV dec slope: 535.1 cm/sec2  MV dec time: 0.18 sec  Ao V2 max: 224.6 cm/sec  Ao max P.2 mmHg  Ao V2 mean: 159.5 cm/sec  Ao mean P.3 mmHg  Ao V2 VTI: 51.5 cm  WOLFGANG(I,D): 2.7 cm2  WOLFGANG(V,D): 2.7 cm2  LV V1 max P.9 mmHg  LV V1 max: 140.4 cm/sec  LV V1 VTI: 32.3 cm  SV(LVOT): 138.6 ml  SI(LVOT): 64.4 ml/m2  PA acc time: 0.06 sec  TR max baljeet: 268.1 cm/sec  TR max P.7 mmHg  AV Baljeet Ratio (DI): 0.63  WOLFGANG Index (cm2/m2): 1.3  E/E' av.6  Lateral E/e': 7.8  Medial E/e': 9.4     ______________________________________________________________________________  Report approved by: LYDIA NUNEZ MD on 2025 08:38 AM         Echocardiogram Complete     Value    LVEF  60-65%    Narrative    [id number]  [id number]  [id number]  [id number]^MICHELL^MAGALIE^     [facility]  [facility]  Echocardiography Laboratory  [address] [place] [id number]     Name: MARY JO CRAIN  MRN: [id number]  : 1965  Study Date: 2025 10:53 AM  Age: 60 yrs  Gender: Male  Patient [facility]: [facility]  Reason For Study: Pulmonary Hypertension  Ordering Physician: MAGALIE GALARZA  Performed By: Karolina Espana     BSA: 2.1 m2  Height: 72 in  Weight: 196 lb  BP: 91/56 mmHg  ______________________________________________________________________________  Procedure  Echocardiogram with two-dimensional, color and spectral Doppler. Poor acoustic  windows.  ______________________________________________________________________________  Interpretation Summary  Poor acoustic windows.  Pulmonary artery systolic pressure is normal.  Global and regional left ventricular function is normal with an EF of 60-65%.  Right ventricular function, chamber size, wall motion, and thickness are  normal.  The inferior vena cava is normal.  No pericardial effusion is present.  ______________________________________________________________________________  Left Ventricle  Global and regional left ventricular function is normal with an EF of 60-65%.  Left ventricular wall thickness is normal. Left ventricular size is normal.  Left ventricular diastolic function is not assessable. No regional wall motion  abnormalities are seen.     Right Ventricle  Right ventricular function, chamber size, wall motion, and thickness are  normal.     Atria  Both atria appear normal.     Mitral Valve  The mitral valve is normal.     Aortic Valve  The valve leaflets are not well visualized. Trace aortic insufficiency is  present.     Tricuspid Valve  The tricuspid valve is normal. The right ventricular systolic pressure is  approximated at 30.7 mmHg plus the right atrial pressure. Pulmonary artery  systolic pressure is normal.     Pulmonic Valve  The valve leaflets are not well visualized. On Doppler interrogation, there is  no  significant stenosis or regurgitation.     Vessels  The thoracic aorta cannot be assessed. The pulmonary artery cannot be  assessed. The inferior vena cava is normal.     Pericardium  No pericardial effusion is present.     Compared to Previous Study  TR severitynot not seen in prior study.  ______________________________________________________________________________  MMode/2D Measurements & Calculations  LVOT diam: 2.1 cm  LVOT area: 3.6 cm2  TAPSE: 2.0 cm     Doppler Measurements & Calculations  Ao V2 max: 213.6 cm/sec  Ao max P.2 mmHg  Ao V2 mean: 139.8 cm/sec  Ao mean P.8 mmHg  Ao V2 VTI: 43.4 cm  WOLFGANG(I,D): 2.4 cm2  WOLFGANG(V,D): 2.1 cm2  LV V1 max P.1 mmHg  LV V1 max: 123.7 cm/sec  LV V1 VTI: 29.0 cm  SV(LVOT): 104.3 ml  SI(LVOT): 49.4 ml/m2  TR max baljeet: 277.2 cm/sec  TR max P.7 mmHg  AV Baljeet Ratio (DI): 0.58  WOLFGANG Index (cm2/m2): 1.1     ______________________________________________________________________________  Report approved by: STEVEN Clarke MD on 2025 12:05 PM           *Note: Due to a large number of results and/or encounters for the requested time period, some results have not been displayed. A complete set of results can be found in Results Review.       Discharge Medications      Review of your medicines        START taking        Dose / Directions   droxidopa 100 MG capsule  Commonly known as: NORTHERA  Used for: ESRD (end stage renal disease) on dialysis (H)      Dose: 100 mg  Take 1 capsule (100 mg) by mouth 3 times daily.  Quantity: 90 capsule  Refills: 0     lacosamide 50 MG Tabs tablet  Commonly known as: VIMPAT  Used for: Seizures (H)      Dose: 50 mg  Start taking on: 2025  Take 1 tablet (50 mg) by mouth daily.  Quantity: 30 tablet  Refills: 0     naloxone 4 MG/0.1ML nasal spray  Commonly known as: NARCAN  Used for: Malfunction of arteriovenous dialysis fistula, initial encounter      Dose: 4 mg  Spray 1 spray (4 mg) into one nostril alternating nostrils as needed  for opioid reversal. every 2-3 minutes until assistance arrives  Quantity: 2 each  Refills: 1     rOPINIRole 0.5 MG tablet  Commonly known as: REQUIP  Used for: Restless legs syndrome (RLS)      Dose: 0.5 mg  Take 1 tablet (0.5 mg) by mouth 2 times daily.  Quantity: 60 tablet  Refills: 0            CHANGE how you take these medications        Dose / Directions   * midodrine 10 MG tablet  Commonly known as: PROAMATINE  This may have changed: See the new instructions.  Used for: ESRD (end stage renal disease) on dialysis (H)      Dose: 20 mg  Take 2 tablets (20 mg) by mouth every 8 hours.  Quantity: 180 tablet  Refills: 0     * midodrine 10 MG tablet  Commonly known as: PROAMATINE  This may have changed: You were already taking a medication with the same name, and this prescription was added. Make sure you understand how and when to take each.  Used for: ESRD (end stage renal disease) on dialysis (H)      Dose: 10 mg  Take 1 tablet (10 mg) by mouth daily as needed (for use DURING dialysis, give if SBP < 95 after 1 hr into run).  Quantity: 30 tablet  Refills: 0     oxyCODONE 5 MG tablet  Commonly known as: ROXICODONE  This may have changed:   when to take this  reasons to take this  Used for: Malfunction of arteriovenous dialysis fistula, initial encounter      Dose: 5 mg  Take 1 tablet (5 mg) by mouth every 4 hours as needed for moderate to severe pain.  Quantity: 20 tablet  Refills: 0     tacrolimus 0.5 MG capsule  Commonly known as: GENERIC EQUIVALENT  This may have changed:   medication strength  how much to take  when to take this  Used for: Liver transplanted (H)      Dose: 0.5 mg  Take 1 capsule (0.5 mg) by mouth 2 times daily.  Quantity: 60 capsule  Refills: 0           * This list has 2 medication(s) that are the same as other medications prescribed for you. Read the directions carefully, and ask your doctor or other care provider to review them with you.                CONTINUE these medicines which have NOT  CHANGED        Dose / Directions   acetaminophen 325 MG tablet  Commonly known as: TYLENOL  Used for: Dialysis AV fistula malfunction, sequela      Dose: 650 mg  Take 2 tablets (650 mg) by mouth every 8 hours as needed for other (For optimal non-opioid multimodal pain management to improve pain control.)  Refills: 0     aspirin 81 MG EC tablet  Used for: Coronary artery disease involving native coronary artery of native heart without angina pectoris      Dose: 81 mg  Take 1 tablet (81 mg) by mouth daily  Quantity: 90 tablet  Refills: 3     calcium carbonate 500 MG chewable tablet  Commonly known as: TUMS      Dose: 1 chew tab  Take 1 chew tab by mouth 4 times daily as needed for heartburn.  Refills: 0     famotidine 20 MG tablet  Commonly known as: PEPCID      Dose: 20 mg  Take 20 mg by mouth 2 times daily as needed.  Refills: 0     gabapentin 100 MG capsule  Commonly known as: NEURONTIN      Dose: 300 mg  Take 300 mg by mouth nightly as needed for other (RLS).  Refills: 0     melatonin 3 MG tablet      Dose: 3 mg  Take 3 mg by mouth nightly as needed for sleep.  Refills: 0     multivitamin RENAL 1 capsule capsule  Used for: Restless leg syndrome, ESRD (end stage renal disease) on dialysis (H)      Dose: 1 capsule  TAKE 1 CAPSULE BY MOUTH DAILY  Quantity: 90 capsule  Refills: 3     omeprazole 40 MG DR capsule  Commonly known as: PriLOSEC  Used for: Gastroesophageal reflux disease with esophagitis without hemorrhage      Dose: 40 mg  TAKE 1 CAPSULE(40 MG) BY MOUTH DAILY  Quantity: 90 capsule  Refills: 1     warfarin ANTICOAGULANT 2 MG tablet  Commonly known as: COUMADIN/JANTOVEN      Take as directed. If you are unsure how to take this medication, talk to your nurse or doctor.  Original instructions: Take 8 mg by mouth every Monday and Friday, 6 mg by mouth all other days  Refills: 0               Where to get your medicines        These medications were sent to [id number], [place] [address] [address] [facility] [address], [facility] [address]      Phone: [phone]   droxidopa 100 MG capsule  lacosamide 50 MG Tabs tablet  midodrine 10 MG tablet  midodrine 10 MG tablet  naloxone 4 MG/0.1ML nasal spray  oxyCODONE 5 MG tablet  rOPINIRole 0.5 MG tablet  tacrolimus 0.5 MG capsule       Allergies   No Known Allergies

## 2025-07-06 NOTE — PLAN OF CARE
Goal Outcome Evaluation:      Overall Patient Progress: no changeOverall Patient Progress: no change    Outcome Evaluation: ambulated IND in halls x1, oxy x2 for arm pain    Pt softer Bps (midodrine and droxidopan) on RA. Aox4.  Awaiting for INR to be in goal range. Heparin gtt currently at 1850 units/hr. Hep Xa due this AM.

## 2025-07-07 ENCOUNTER — TELEPHONE (OUTPATIENT)
Dept: FAMILY MEDICINE | Facility: CLINIC | Age: 60
End: 2025-07-07

## 2025-07-07 ENCOUNTER — DOCUMENTATION ONLY (OUTPATIENT)
Dept: ANTICOAGULATION | Facility: CLINIC | Age: 60
End: 2025-07-07
Payer: COMMERCIAL

## 2025-07-07 ENCOUNTER — TELEPHONE (OUTPATIENT)
Dept: FAMILY MEDICINE | Facility: CLINIC | Age: 60
End: 2025-07-07
Payer: COMMERCIAL

## 2025-07-07 DIAGNOSIS — G25.81 RESTLESS LEG SYNDROME: ICD-10-CM

## 2025-07-07 DIAGNOSIS — Z94.4 LIVER TRANSPLANTED (H): Primary | ICD-10-CM

## 2025-07-07 DIAGNOSIS — I63.10 CEREBROVASCULAR ACCIDENT (CVA) DUE TO EMBOLISM OF PRECEREBRAL ARTERY (H): ICD-10-CM

## 2025-07-07 DIAGNOSIS — G47.00 INSOMNIA, UNSPECIFIED TYPE: ICD-10-CM
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
## 2025-07-07 DIAGNOSIS — Z79.01 LONG TERM CURRENT USE OF ANTICOAGULANT THERAPY: ICD-10-CM

## 2025-07-07 DIAGNOSIS — Z99.2 ESRD (END STAGE RENAL DISEASE) ON DIALYSIS (H): ICD-10-CM

## 2025-07-07 DIAGNOSIS — I48.91 ATRIAL FIBRILLATION WITH RAPID VENTRICULAR RESPONSE (H): Primary | ICD-10-CM

## 2025-07-07 DIAGNOSIS — I26.99 ACUTE PULMONARY EMBOLISM, UNSPECIFIED PULMONARY EMBOLISM TYPE, UNSPECIFIED WHETHER ACUTE COR PULMONALE PRESENT (H): ICD-10-CM

## 2025-07-07 DIAGNOSIS — I48.91 ATRIAL FIBRILLATION WITH RAPID VENTRICULAR RESPONSE (H): ICD-10-CM

## 2025-07-07 DIAGNOSIS — N18.6 ESRD (END STAGE RENAL DISEASE) ON DIALYSIS (H): ICD-10-CM

## 2025-07-07 DIAGNOSIS — Z86.73 HISTORY OF CVA (CEREBROVASCULAR ACCIDENT): ICD-10-CM

## 2025-07-07 PROCEDURE — 84132 ASSAY OF SERUM POTASSIUM: CPT

## 2025-07-07 PROCEDURE — 85396 CLOTTING ASSAY WHOLE BLOOD: CPT

## 2025-07-07 NOTE — PROGRESS NOTES
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    Discharge disposition: Home    Results:    Recent labs: (last 7 days)     25  0452 25  0554 25  0530 25  0555 25  1245 25  2048 25  0400 25  0540 25  1559   INR 1.28* 1.24* 1.23* 1.38*  --   --  1.64* 1.93* 1.92*   AAUFH 0.58 0.45 0.42 0.31 0.28 0.49 0.50 0.33  --      Anticoagulation inpatient management:     anticoagulation calendar updated with inpatient dosing    Anticoagulation discharge instructions:     Warfarin dosin mg mOn and Friday and 6 mg all other days   Bridging: No   INR goal change: No 2-3     Medication changes affecting anticoagulation: Yes: Concurrent use of ROPINIROLE and WARFARIN may result in an increase in INR.       Additional factors affecting anticoagulation: No     PLAN     Agree with discharge plan for follow up on 2025    Spoke with Annemarie    Anticoagulation Calendar updated    Tamia Gomes RN  2025  Anticoagulation Clinic  [facility] for routing messages: vashti MCGREGOR  ACC patient phone line: [phone]

## 2025-07-07 NOTE — TELEPHONE ENCOUNTER
[place] triage team - patient discharged from hospital yesterday, in need urgent PA request for droxidopa 100 mg three times daily for hypotension. Can you please have urgent PA request submitted for this?    Thanks,  Veena Patterson, PharmD  Medication Therapy Management Pharmacist    
Patient was scheduled for MTM appointment, he is not available at this time. Discharged yesterday from [facility].     Wife states Claudia is reaching out to [facility] provider for authorization on droxidopa 100 mg three times daily - Claudia needs to order it and needs to be authorized through insurance.  Will ask PCP, Ki Carter PA-C and triage team for urgent prior authorization on this.     Veena Patterson, PharmD  Medication Therapy Management Pharmacist      
Please see PA Encounter dated 07/07/25.    RN started PA for the Droxidopa and sent it off as high priority with a note that it is urgent.    Thank you.    Sandra ROE,  Triage RN  [facility] Internal Medicine    
Stable.

## 2025-07-07 NOTE — DISCHARGE SUMMARY
Dialysis Discharge Summary Brief    [facility]  Division of Nephrology  Nephrology Discharge Dialysis Orders  Ph: [phone]  Fax: [phone]    Blanco Osborne  MRN: [id number]  YOB: 1965    [facility] Dialysis Unit: [facility]  Primary Nephrologist: Dr. Bradshaw    Date of Admission: 6/27/2025  Date of Discharge: 7/6/2025    Please see faxed hospitalist discharge summary for full details. Note underlined portions below and call me at [phone] with any questions. Thanks much. Teressa Olivas PA-C    Blanco Osborne is a 59 yo male with ESRD (on HD MWF since 1/23), NAFLD s/p liver transplant (9/2016), PEA arrest 2/2 hypoxic respiratory failure (2022), pAF, CVA, HLD, GERD, CHRISTIAN on CPAP, RLS, and hx seizures. Presented to [facility] ED for hypoxia and AMS. Appeared to be volume overloaded on ED exam, subsequently admitted to [facility] 6/26 for HD. However after HD pt was persistently hypotensive despite midodrine, therefore started on pressors and transferred to [facility] given bed availability. Treating for shock 2/2 presumed infection, also found to have PE.     #ESKD: dialyzes MWF at [facility] with Dr. Bradshaw. Access: tunneled RIJ (clotted L AVF s/p revision with Dr Mcneil 6/24/25, not to be used for 1 month). Run time: 3.5 hrs. TW 89.5 kg. Active on tx list here and is also being evaluated at [place] for SLK     #BP/Volume: anuric, TW 89.5 kg   - hx of intradialytic hypotension   - on admission, evidence of volume overload on CXR and echo; likely congestive hepatopathy  - echo 6/27 with EF 60-65%, flattened septum c/w R ventricular pressure and volume overload (also in setting of new PE), IVC diameter and resp changes in intermediate range; no pericardial effusion; pt was dialyzed daily until TW was achieved; once TW of 89.5 kg was achieved, IVC was normal, all pressures normal  -  on midodrine 20 mg TID  - was started on droxidopa 100 mg tid by primary  "team,   -  midodrine 10 mg for use during HD   - ok for SBP > 85 if asymptomatic on dialysis  - s/p 3 L paracentesis on 6/27       #pAfib: has been on DOAC vs warfarin on and off  #New PE: on heparin and now started on warfarin  - CT with \"PE in posterior segmental and subsegmental arterial branches in the left upper lobe. No evidence of right heart strain.\"  - restarted on warfarin    Discharge Diagnosis:    ICD-10-CM    1. ESRD (end stage renal disease) on dialysis (H)  N18.6 midodrine (PROAMATINE) 10 MG tablet    Z99.2 midodrine (PROAMATINE) 10 MG tablet     droxidopa (NORTHERA) 100 MG capsule      2. NAFLD (nonalcoholic fatty liver disease)  K76.0       3. Liver transplanted (H)  Z94.4 tacrolimus (GENERIC EQUIVALENT) 0.5 MG capsule      4. Seizures (H)  R56.9 lacosamide (VIMPAT) 50 MG TABS tablet      5. Restless legs syndrome (RLS)  G25.81 rOPINIRole (REQUIP) 0.5 MG tablet      6. Acute pulmonary embolism, unspecified pulmonary embolism type, unspecified whether acute cor pulmonale present (H)  I26.99 ANTICOAGULATION CLINIC REFERRAL      7. Malfunction of arteriovenous dialysis fistula, initial encounter  T82.590A naloxone (NARCAN) 4 MG/0.1ML nasal spray     oxyCODONE (ROXICODONE) 5 MG tablet           [x] Resume all previous dialysis orders with exception as noted below    New Orders (if not applicable put NA):  Estimated Dry Weight No change   Dialysis Duration     Dialysis Access     Antibiotics (dose per dialysis, end date)             Labs to be drawn at dialysis     Other major changes to dialysis prescription (e.g. Dialysate bath, heparin, blood flow rate, etc)       Medication changes (also fax the unit a copy of the discharge summary)         Pt has been restarted on warfarin     Name of physician completing this form: JEANCARLOS Fowler   "

## 2025-07-07 NOTE — TELEPHONE ENCOUNTER
I would definitely speak with [facility] staff on that new medication - if not can get them connected with MTM for further guidance.

## 2025-07-07 NOTE — TELEPHONE ENCOUNTER
TO PCP:     Pt's spouse on C2C called     Pt recently discharged from hospital stay     Started on Doxidropa at discharge     Pt's spouse checked with pharmacy - is $3400 for 90 pills, one month supply     Pharmacist had never heard of this medication - Would need PA from insurance company and also to order Rx     89/50 BP at dialysis now. Ofe was not with patient but she's unsure if they'll be able to remove the fluid because of the low BP     Also taking Midodrine 10mg - takes 2 tablets every 8 hours plus 10mg daily as needed     Pharmacy is trying to reach the [facility] staff to see if alternative Rx can be prescribed     Was getting albumin during dialysis while in patient. Dr Bradshaw did not order albumin out patient so she's unsure what they'll do about that    She is asking if PCP has any recommendations? She is waiting to see if Claudia hears back from [facility] staff also     Laura ATKINS Triage RN  [facility] Internal Medicine Clinic       droxidopa (NORTHERA) 100 MG capsule 90 capsule 0 7/6/2025 -- No   Sig - Route: Take 1 capsule (100 mg) by mouth 3 times daily. - Oral   Sent to pharmacy as: Droxidopa 100 MG Oral Capsule (NORTHERA)   Class: E-Prescribe   Order: [id number]   E-Prescribing Status: Receipt confirmed by pharmacy (7/6/2025  2:22 PM CDT)   Renewals    Renewal requests to authorizing provider (Darline Hollis MD) prohibited        Printout Tracking    External Result Report     Pharmacy    [id number] - [place], [place] - [address] AT [facility] [address]   Associated Diagnoses    ESRD (end stage renal disease) on dialysis (H) [N18.6, Z99.2]  - Primary

## 2025-07-07 NOTE — PROGRESS NOTES
Discharge note:    Soft BPs (100s/80s), other vital signs within normal limits. PRN oxycodone given x1 for abdominal pain. Reviewed discharge instructions with patient and wife and they expressed understanding. Removed 3 PIVs. Patient's wife called patient's [facility] pharmacy and confirmed that discharge medication prescriptions had been received. Evening Prograf and warfarin given. Patient left unit via wheelchair at approximately 18:30.

## 2025-07-07 NOTE — TELEPHONE ENCOUNTER
To PCP:    Patient's wife, Ofe, states that the pharmacy was reaching out to the [facility]  regarding the prescription but they can't share information with her as she isn't a guardian of the Patient. RN recommended she call the [facility] and try to get info that way if possible.    Ofe would like the MT referral as back up placed in the chart.    Referral pended for PCP to review, thank you.    Sandra ROE,  Triage RN  [facility] Internal Medicine

## 2025-07-07 NOTE — PLAN OF CARE
Occupational Therapy Discharge Summary    Reason for therapy discharge:    Discharged to home.    Progress towards therapy goal(s). See goals on Care Plan in [facility] electronic health record for goal details.  Goals partially met.  Barriers to achieving goals:   discharge from facility.    Therapy recommendation(s):    Assist with I/ADLs as needed.

## 2025-07-08 ENCOUNTER — VIRTUAL VISIT (OUTPATIENT)
Dept: PHARMACY | Facility: CLINIC | Age: 60
End: 2025-07-08
Payer: COMMERCIAL

## 2025-07-08 ENCOUNTER — TELEPHONE (OUTPATIENT)
Dept: ONCOLOGY | Facility: CLINIC | Age: 60
End: 2025-07-08
Payer: COMMERCIAL

## 2025-07-08 DIAGNOSIS — N18.6 ESRD (END STAGE RENAL DISEASE) ON DIALYSIS (H): ICD-10-CM

## 2025-07-08 DIAGNOSIS — R56.9 SEIZURES (H): ICD-10-CM

## 2025-07-08 DIAGNOSIS — I25.10 CORONARY ARTERY DISEASE INVOLVING NATIVE CORONARY ARTERY OF NATIVE HEART WITHOUT ANGINA PECTORIS: ICD-10-CM

## 2025-07-08 DIAGNOSIS — Z99.2 ESRD (END STAGE RENAL DISEASE) ON DIALYSIS (H): ICD-10-CM

## 2025-07-08 DIAGNOSIS — I63.10 CEREBROVASCULAR ACCIDENT (CVA) DUE TO EMBOLISM OF PRECEREBRAL ARTERY (H): ICD-10-CM

## 2025-07-08 DIAGNOSIS — I95.9 HYPOTENSION, UNSPECIFIED HYPOTENSION TYPE: Primary | ICD-10-CM

## 2025-07-08 DIAGNOSIS — K21.00 GASTROESOPHAGEAL REFLUX DISEASE WITH ESOPHAGITIS, UNSPECIFIED WHETHER HEMORRHAGE: ICD-10-CM

## 2025-07-08 DIAGNOSIS — I48.91 ATRIAL FIBRILLATION WITH RAPID VENTRICULAR RESPONSE (H): ICD-10-CM

## 2025-07-08 DIAGNOSIS — R52 PAIN: ICD-10-CM

## 2025-07-08 DIAGNOSIS — G25.81 RESTLESS LEGS SYNDROME (RLS): ICD-10-CM

## 2025-07-08 DIAGNOSIS — Z78.9 TAKES DIETARY SUPPLEMENTS: ICD-10-CM

## 2025-07-08 DIAGNOSIS — Z86.711 HISTORY OF PULMONARY EMBOLISM: ICD-10-CM

## 2025-07-08 DIAGNOSIS — Z94.4 LIVER TRANSPLANTED (H): ICD-10-CM

## 2025-07-08 LAB
BASE EXCESS BLDA CALC-SCNC: NORMAL MMOL/L
BASE EXCESS BLDA CALC-SCNC: NORMAL MMOL/L
CA-I BLD-MCNC: NORMAL MG/DL
CA-I BLD-MCNC: NORMAL MG/DL
CLOT INIT KAOL IND TO POST HEP NEUT TRTO: NORMAL {RATIO}
CLOT INIT KAOLIN IND BLD US: NORMAL S
CLOT INIT KAOLIN IND P HEP NEUT BLD US: NORMAL S
CLOT STIFF PLT CONT BLD CALC: NORMAL HPA
CLOT STIFF TF IND P HEP NEUT BLD US: NORMAL HPA
CLOT STIFF TF IND+IIB-IIIA INH P HEP NEU: NORMAL HPA
GLUCOSE BLD-MCNC: NORMAL MG/DL
GLUCOSE BLD-MCNC: NORMAL MG/DL
HCO3 BLDA-SCNC: NORMAL MMOL/L
HCO3 BLDA-SCNC: NORMAL MMOL/L
HGB BLD-MCNC: NORMAL G/DL
HGB BLD-MCNC: NORMAL G/DL
LACTATE BLD-SCNC: NORMAL MMOL/L
LACTATE BLD-SCNC: NORMAL MMOL/L
O2/TOTAL GAS SETTING VFR VENT: NORMAL %
O2/TOTAL GAS SETTING VFR VENT: NORMAL %
OXYHGB MFR BLDA: NORMAL %
OXYHGB MFR BLDA: NORMAL %
PCO2 BLDA: NORMAL MM[HG]
PCO2 BLDA: NORMAL MM[HG]
PH BLDA: NORMAL [PH]
PH BLDA: NORMAL [PH]
PO2 BLDA: NORMAL MM[HG]
PO2 BLDA: NORMAL MM[HG]
POTASSIUM BLD-SCNC: NORMAL MMOL/L
POTASSIUM BLD-SCNC: NORMAL MMOL/L
SAO2 % BLDA: NORMAL %
SAO2 % BLDA: NORMAL %
SODIUM BLD-SCNC: NORMAL MMOL/L
SODIUM BLD-SCNC: NORMAL MMOL/L

## 2025-07-08 PROCEDURE — 99207 PR NO CHARGE LOS: CPT | Mod: 93 | Performed by: PHARMACIST

## 2025-07-08 PROCEDURE — 1111F DSCHRG MED/CURRENT MED MERGE: CPT | Mod: 93 | Performed by: PHARMACIST

## 2025-07-08 RX ORDER — TACROLIMUS 1 MG/1
1 CAPSULE ORAL 2 TIMES DAILY
COMMUNITY

## 2025-07-08 RX ORDER — ACETAMINOPHEN 500 MG
500-1000 TABLET ORAL EVERY 6 HOURS PRN
COMMUNITY

## 2025-07-08 NOTE — TELEPHONE ENCOUNTER
PA Initiation    Medication: DROXIDOPA 100 MG PO CAPS  Insurance Company: [employer] ([facility]) - Phone [phone] Fax [phone]  Pharmacy Filling the Rx:    Filling Pharmacy Phone:    Filling Pharmacy Fax:    Start Date: 7/8/2025

## 2025-07-08 NOTE — Clinical Note
Hi Dr. Simms, I met with Blanco today for medication review, he was discharged from [facility] on 7/6 for hypoxia, fluid overload, and new pulmonary embolism. I am messaging in regard to his tacrolimus s/p liver transplant  - do you prescribe this for him? He was taking tacrolimus 1 mg twice daily prior to hospitalization vs 0.5 mg twice daily per our records. He notes he increased to 1 mg twice daily sometime in the past because [place] indicated he had a spot on his liver that was showing some rejection (his concomitant omeprazole may also increase concentrations of this).  He notes you prescribe the tacrolimus and I just wanted to check and confirm that, and if so, what dose of tacrolimus he should be on at this time and would you recommend repeating a level at all?  Thanks, Veena Patterson, PharmD Medication Therapy Management Pharmacist

## 2025-07-08 NOTE — TELEPHONE ENCOUNTER
Hi,     I am the Liaison working the prior auth. If any of these: diagnosis must be caused by primary autonomic failure (Parkinson's disease, multiple system atrophy, and pure autonomic failure), dopamine beta-hydroxylase deficiency, or non-diabetic  autonomic neuropathy work in this situation I need to know so I can appeal the denial.  New script won't be needed.     Thank you!  [email or web address] [email or web address]  Oral Oncology/Transplant Liaison   [facility]  Phone #  [phone]  Fax # [phone]   [email or web address]

## 2025-07-08 NOTE — TELEPHONE ENCOUNTER
No other ideas here unfortunately. If it's not covered, he may just need to do the midodrine 20 mg scheduled three times daily and see how that goes.     Veena Patterson, PharmD  Medication Therapy Management Pharmacist

## 2025-07-08 NOTE — TELEPHONE ENCOUNTER
PRIOR AUTHORIZATION DENIED    Medication: DROXIDOPA 100 MG PO CAPS  Insurance Company: [employer] ([facility]) - Phone [phone] Fax [phone]  Denial Date: 7/8/2025  Denial Reason(s): your diagnosis must be caused by primary autonomic failure (Parkinson's disease, multiple  system atrophy, and pure autonomic failure), dopamine beta-hydroxylase deficiency, or non-diabetic  autonomic neuropathy  Appeal Information:    Patient Notified: no

## 2025-07-08 NOTE — PATIENT INSTRUCTIONS
"Recommendations from today's MTM visit:                                                    MTM (medication therapy management) is a service provided by a clinical pharmacist designed to help you get the most of out of your medicines.   Today we reviewed what your medicines are for, how to know if they are working, that your medicines are safe and how to make your medicine regimen as easy as possible.      Veena will follow-up on droxidopa prior authorization.   Hold ropinirole as this might be contributing to low blood pressure.  Take gabapentin 200 mg at bedtime every night.   Veena will follow-up on Dr. Juan Simms on what dose of tacrolimus you should be on    Follow-up: Return in about 1 week (around 7/15/2025) for Medication Therapy Management.    It was great speaking with you today.  I value your experience and would be very thankful for your time in providing feedback in our clinic survey. In the next few days, you may receive an email or text message from [employer] with a link to a survey related to your  clinical pharmacist.\"     To schedule another MTM appointment, please call the clinic directly or you may call the MTM scheduling line at [phone].    My Clinical Pharmacist's contact information:                                                      Please feel free to contact me with any questions or concerns you have.      Veena Patterson, PharmD  Medication Therapy Management Pharmacist     "

## 2025-07-08 NOTE — Clinical Note
JAMARI AYALA note, thanks!  Veena Patterson, PharmD Medication Therapy Management Pharmacist [phone]

## 2025-07-08 NOTE — PROGRESS NOTES
Medication Therapy Management (MTM) Encounter    ASSESSMENT:                            Medication Adherence/Access: See below for considerations.    Hypotension:    Midodrine 20 mg three times daily may be enough to maintain blood pressure, however would be beneficial to have droxidopa on hand if needed. Will follow-up on urgent PA for this.    ESRD:   Resuming dialysis, plan in place.     Liver Transplant (9/2016):    Unclear what dose of tacrolimus patient should be on. Our records indicate tacrolimus 0.5 mg twice daily, however he has been taking 1 mg twice daily. Omeprazole may increase levels of tacrolimus.     RLS:   Ropinirole may decrease or increase blood pressure - more often lower blood pressure. May benefit from remaining off of this and increasing gabapentin to 200 mg at bedtime every evening. Can reassess to determine how blood pressure is doing and need for ropinirole at follow-up.    Afib/History of CVA/Pulmonary Embolism/CAD: Plan in place with anticoag clinic.    Seizures:   Stable.    Pain s/p fistula surgery:    Stable.    Insomnia:  No changes.    GERD  Stable.    PLAN:                            Veena will follow-up on droxidopa prior authorization.   Hold ropinirole as this might be contributing to low blood pressure.  Take gabapentin 200 mg at bedtime every night.   Veena will follow-up on Dr. Juan Simms on what dose of tacrolimus you should be on.     Suggestions/questions for Dr. Juan Simms and Jerrica Almaraz, coordinator:  Jeff Simms,  I met with Blanco payne for medication review, he was discharged from [facility] on 7/6 for hypoxia, fluid overload, and new pulmonary embolism. I am messaging in regard to his tacrolimus s/p liver transplant  - do you prescribe this for him? He was taking tacrolimus 1 mg twice daily prior to hospitalization vs 0.5 mg twice daily per our records. He notes he increased to 1 mg twice daily sometime in the past because [place] indicated he had a spot on his liver that was  showing some rejection (his concomitant omeprazole may also increase concentrations of this).  He notes you prescribe the tacrolimus and I just wanted to check and confirm that, and if so, what dose of tacrolimus he should be on at this time and would you recommend repeating a level at all?    Thanks,  Veena Patterson, PharmD  Medication Therapy Management Pharmacist    Follow-up: Return in about 1 week (around 7/15/2025) for Medication Therapy Management.    SUBJECTIVE/OBJECTIVE:                          Blanco Osborne is a 60 year old male seen for an initial visit. He was referred to me from Ki Carter for Med management.   He was also discharged from [facility] on 7/6/25 for hypoxia, fluid overload, (discharge paperwork also notes found to have new pulmonary embolism)    Reason for visit: med review - droxidopa.    Allergies/ADRs: None  Past Medical History: Reviewed in chart  Tobacco: He reports that he has never smoked. He has never been exposed to tobacco smoke. He has never used smokeless tobacco.  Alcohol: none    Medication Adherence/Access:   Patient takes medications directly from bottles.  Patient takes medications 2-4 time(s) per day.   Per patient, misses medication 0 time(s) per week.   The patient fills medications at [place]: NO, fills medications at [facility].    Hypotension:    Midodrine 20 mg three times daily (prior to hospitalization it was only as needed), previously he took midodrine 10 mg one hour before dialysis, and then if needed 10 minutes before dialysis (if systolic < 110), if dropped < 100 during dialysis would take more.  Droxidopa was prescribed at discharge - [facility] needed to order and needs prior authorization.     Blanco got up at 4 am and felt like his blood pressure was low - Ofe checked it and it was 101/60. At 10 am 110/74.  Has a history of hypotension and taking midodrine historically as needed as noted above related to dialysis. Ofe wonders if the increase in  midodrine to scheduled might be enough to keep blood pressure elevated, might not need the droxidopa.     ESRD:   M,W,F dialysis since 1/23 - resuming tomorrow, W, since discharge  Renal triphocaps 1 daily     Currently being evaluated at [facility] for kidney transplant   Denies side effects.    Liver Transplant (9/2016):    Tacrolimus 1 mg every morning, 1 mg every evening - he was told at [facility] there is a spot on his liver that might be rejection, so he increase to 1 mg twice daily (prescribed by Dr. Simms, Liver doctor). He doesn't think he's every been on 0.5 mg twice daily dose -can't get from pharmacy.     Side effects: hand tremor from tacrolimus  Tx Coordinator: Charu Herrera MD: Dr. Simms    RLS:   Gabapentin 200 mg at bedtime (was taking PRN prior to hospitalization for sleep)  Ropinirole 0.5mg at bedtime (was taking it PRN during dialysis prior to hospitalization, he isn't sure if it was helpful for the RLS at all, but might have been helpful for the shakes in his hands).     Ofe wonders if ropinirole might be contributing to low blood pressure - he was getting it regularly in the hospital and struggling with significant lower blood pressure.   He notes he has restless leg symptoms, which are separate from the shaking/tremor in his hands that he attributes to tacrolimus. He had the shake/tremor in his hand while in the hospital and was getting ropinirole regularly in the hospital.   Side effects: low blood pressure    Afib/History of CVA/Pulmonary Embolism/CAD:  Warfarin 8 mg on Mondays and Fridays, rest of week 6 mg daily  Aspirin 81 mg daily     They were told conflicting information between [facility] that the warfarin would be easier to reverse for kidney transplant, and that [facility] said that wasn't true, could go back to [facility].     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  Plan in place  to get INR drawn tomorrow.     Patient reports no current medication side effects.    Patient does not have a history of GI bleed.     Seizures:  Lacosamide 50mg daily    Seizures occurred in the past, has been on higher doses of lacosamide in the past per chart review.    Pain s/p fistula surgery:    Oxycodone 5 mg as needed  Acetaminophen 500 mg as needed  Narcan -haven't picked up    He was alternating these during the day for pain, taking acetaminophen for sure at bedtime as it lasts longer.  He knows he can't take this when he drives.    Insomnia:  Melatonin 3 mg at bedtime     Sometimes he gets bad dreams.     GERD  Omeprazole 40 mg once daily   Tums as needed    Patient feels that current regimen is effective.  The patient does not have a history of GI bleed.        Today's Vitals: There were no vitals taken for this visit.  ----------------  Post Discharge Medication Reconciliation Status: discharge medications reconciled and changed, per note/orders.    I spent 54 minutes with this patient today. All changes were made via collaborative practice agreement with Ki Carter PA-C.     A summary of these recommendations was sent via [employer].    Veena Patterson, BitaD  Medication Therapy Management Pharmacist      Telemedicine Visit Details  The patient's medications can be safely assessed via a telemedicine encounter.  Type of service:  Telephone visit  Originating Location (pt. Location): Home    Distant Location (provider location):  Off-site  Start Time: 9:35 AM  End Time: 10:29 AM     Medication Therapy Recommendations  Hypotension, unspecified hypotension type   1 Current Medication: droxidopa (NORTHERA) 100 MG capsule   Current Medication Sig: Take 1 capsule (100 mg) by mouth 3 times daily.   Rationale: Medication product not available - Adherence - Adherence   Recommendation: Provide Adherence Intervention   Status: Patient Agreed - Adherence/Education   Identified Date: 7/8/2025  Completed Date: 7/8/2025         Restless legs syndrome (RLS)   1 Current Medication: rOPINIRole (REQUIP) tablet 0.5 mg (Discontinued)   Rationale: Undesirable effect - Adverse medication event - Safety   Recommendation: Discontinue Medication   Status: Patient Agreed - Adherence/Education   Identified Date: 7/8/2025 Completed Date: 7/8/2025

## 2025-07-08 NOTE — Clinical Note
Hi Dr. Simms, I met with Blanco today for medication review, he was discharged from [facility] on 7/6 for hypoxia, fluid overload, and new pulmonary embolism. I am messaging in regard to his tacrolimus s/p liver transplant  - do you prescribe this for him? He was taking tacrolimus 1 mg twice daily prior to hospitalization vs 0.5 mg twice daily per our records. He notes he increased to 1 mg twice daily sometime in the past because [place] indicated he had a spot on his liver that was showing some rejection (his concomitant omeprazole may also increase concentrations of this).  He notes you prescribe the tacrolimus and I just wanted to check and confirm that, and if so, what dose of tacrolimus he should be on at this time and would you recommend repeating a level at all?  Thanks, Veena Patterson, PharmD Medication Therapy Management Pharmacist

## 2025-07-08 NOTE — TELEPHONE ENCOUNTER
Jeff Murray, see DROXIDOPA denial for symptomatic neurogenic orthostatic hypotension.     See denial below for alternate diagnoses, if appropriate. Would primary autonomic failure make sense in his situation? If so, could  you send prescription with this diagnosis code?    Thanks,  Veena Patterson, PharmD  Medication Therapy Management Pharmacist

## 2025-07-09 ENCOUNTER — TELEPHONE (OUTPATIENT)
Dept: TRANSPLANT | Facility: CLINIC | Age: 60
End: 2025-07-09

## 2025-07-09 DIAGNOSIS — K76.0 NAFLD (NONALCOHOLIC FATTY LIVER DISEASE): ICD-10-CM

## 2025-07-09 NOTE — PROGRESS NOTES
VASCULAR SURGERY PROGRESS NOTE    LOCATION[facility]    Blanco Osborne  Medical Record #: [id number]  YOB: 1965  Age: 60 year old     Date of Service: 7/10/2025    PRIMARY CARE PROVIDER: Ki Carter    Reason for visit:  post-op    Blanco Osborne is a 60 year old male status post thrombectomy left upper arm arteriovenous fistula with repair of 2 fistula aneurysms with Dr. Mcneil on 6/24/2025.    On examination Blanco is doing well, continues to have pain.   Incision is fully healed, excellent thrill, 2+ radial pulses  Denies new tingling, numbness.     RECOMMENDATION/RISKS: Discussed with Blanco that in approximately 2 weeks time he can start using his fistula for dialysis. We will have him follow-up with Dr. Mcneil in about 3-4 weeks for tunneled catheter removal.     In approximately 2 months time will do ultrasound of fistula to assess. Refill sent of oxycodone given ongoing moderate-severe pain.     REVIEW OF SYSTEMS:    A 12 point ROS was reviewed and is negative except for what is listed above in HPI.    PHH:    Past Medical History:   Diagnosis Date    Acquired immunocompromised state 11/19/2022    Acute renal failure, unspecified acute renal failure type 11/12/2022    Antiplatelet or antithrombotic long-term use     Arrhythmia     PEA    Ascites     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    Cancer (H) 2023    Squamous Cell Cancer- Since resolved    Cirrhosis of liver with ascites (H) 02/11/2016    Coagulopathy     Critical illness myopathy     Dialysis patient     DIALYSIS 3X/ WEEK    Embolic stroke (H) 11/20/2022    Encephalopathy     ESRD (end stage renal disease) on dialysis (H)     Gastroesophageal reflux disease     H/O alcohol abuse     History of blood transfusion     Hyperammonemia     Hyperlipidemia     Hypertension     Patients states that HTN has been resolved    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    Insomnia     Lactic acidosis 11/12/2022     Liver replaced by transplant (H) 09/20/2016    NAFLD (nonalcoholic fatty liver disease) 02/11/2016    CHRISTIAN on CPAP     Parainfluenza type 1 infection 11/19/2022    Parapneumonic effusion     Pleural effusion     Pneumothorax on left 12/16/2022    Respiratory acidosis 11/12/2022    Respiratory arrest (H) 11/12/2022    Restless legs syndrome (RLS)     SBP (spontaneous bacterial peritonitis) (H)     MNGI    Seizures (H) 12/2022    Sepsis due to Streptococcus pneumoniae with acute hypoxic respiratory failure (H) 11/19/2022    Stroke, embolic (H) 11/20/2022    Sudden cardiac arrest (H) 12/29/2022    PEA- 2 min of CPR    Tubular adenoma 10/2019    Large cecal adenoma- due for surveillance colonoscopy in 3 years (10/2022)          Past Surgical History:   Procedure Laterality Date    APPENDECTOMY      BENCH LIVER N/A 09/20/2016    Procedure: BENCH LIVER;  Surgeon: Enoc Crews MD;  Location: [facility] OR    BRONCHOSCOPY FLEXIBLE AND RIGID N/A 12/20/2022    Procedure: BRONCHOSCOPY;  Surgeon: Alena Valenzuela MD;  Location:  GI    COLONOSCOPY  08/06/2013    repeat in 2018    COLONOSCOPY N/A 10/04/2019    Procedure: COLONOSCOPY, WITH POLYPECTOMY AND BIOPSY;  Surgeon: Go Chong MD;  [facility]: [facility] OR    COLONOSCOPY N/A 3/26/2024    Procedure: COLONOSCOPY, FLEXIBLE, WITH LESION REMOVAL USING SNARE;  Surgeon: Jie Douglas MD;  Location:  GI    CREATE FISTULA ARTERIOVENOUS UPPER EXTREMITY Left 03/21/2023    Procedure: LEFT UPPER EXTREMITY ARTERIOVENOUS FISTULA CREATION. LIGATION OF COMPETING VASCULAR BRANCHES- LEFT;  Surgeon: Deejay Mcneil MD;  Location:  OR    CV CORONARY ANGIOGRAM N/A 2/22/2024    Procedure: Coronary Angiogram;  Surgeon: Nima Dimas MD;  Location:  HEART CARDIAC CATH LAB    CV PERICARDIOCENTESIS N/A 9/29/2023    Procedure: Pericardiocentesis;  Surgeon: Barry Mckeon MD;  Location:  HEART CARDIAC CATH LAB    CV PERICARDIOCENTESIS N/A 10/11/2023     Procedure: Pericardiocentesis;  Surgeon: Ulises Bhakta MD;  Location:  HEART CARDIAC CATH LAB    CV RIGHT HEART CATH MEASUREMENTS RECORDED N/A 10/11/2023    Procedure: Right Heart Catheterization;  Surgeon: Ulises Bhakta MD;  Location:  HEART CARDIAC CATH LAB    ESOPHAGOSCOPY, GASTROSCOPY, DUODENOSCOPY (EGD), COMBINED N/A 7/3/2025    Procedure: Esophagoscopy, gastroscopy, duodenoscopy (EGD), combined;  Surgeon: Rainer Jang MD;  Location:  GI    HERNIA REPAIR      IR CHEST TUBE PLACEMENT NON-TUNNELED RIGHT  12/12/2022    IR CVC TUNNEL PLACEMENT > 5 YRS OF AGE  12/06/2022    IR CVC TUNNEL PLACEMENT > 5 YRS OF AGE  1/3/2024    IR CVC TUNNEL PLACEMENT > 5 YRS OF AGE  6/23/2025    IR DIALYSIS FISTULOGRAM LEFT  07/13/2023    IR DIALYSIS FISTULOGRAM LEFT  6/23/2025    IR GASTROSTOMY TUBE CHANGE  02/08/2023    IR GASTROSTOMY TUBE PERCUTANEOUS PLCMNT  11/29/2022    IR PARACENTESIS  11/29/2022    IR PARACENTESIS  12/01/2022    IR PARACENTESIS  2/10/2016    IR PARACENTESIS  2/28/2016    IR THORACENTESIS  12/06/2022    IR THORACENTESIS  09/01/2020    IR THORACENTESIS  08/10/2020    IR TRANSCATHETER BIOPSY  12/01/2022    IR TRANSCATHETER BIOPSY  4/9/2024    REVISION FISTULA ARTERIOVENOUS UPPER EXTREMITY Left 8/15/2023    Procedure: 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 2; OUTFLOW REVISION CEPHALIC TO BRACHIAL VEIN;  Surgeon: Deejay Mcneil MD;  Location:  OR    REVISION FISTULA ARTERIOVENOUS UPPER EXTREMITY Left 1/3/2024    Procedure: Excision and repair of LEFT brachiocephalic arteriovenous fistula and vein patch angioplasty;  Surgeon: Deejay Mcneil MD;  Location:  OR    THROMBECTOMY UPPER EXTREMITY Left 6/24/2025    Procedure: Thrombectomy upper extremity, repair of aneurysm, left upper arm;  Surgeon: Deejay Mcneil MD;  Location:  OR    TRACHEOSTOMY N/A 11/29/2022    Procedure: TRACHEOSTOMY;  Surgeon: Nilesh Jackson MD;  Location:  OR     TRANSPLANT LIVER RECIPIENT  DONOR N/A 2016    Procedure: TRANSPLANT LIVER RECIPIENT  DONOR;  Surgeon: Enoc Crews MD;  [facility]: [facility] OR       ALLERGIES:  Patient has no known allergies.    MEDS:    Current Outpatient Medications:     acetaminophen (TYLENOL) 500 MG tablet, Take 500-1,000 mg by mouth every 6 hours as needed for mild pain., Disp: , Rfl:     aspirin 81 MG EC tablet, Take 1 tablet (81 mg) by mouth daily, Disp: 90 tablet, Rfl: 3    calcium carbonate (TUMS) 500 MG chewable tablet, Take 1 chew tab by mouth 4 times daily as needed for heartburn., Disp: , Rfl:     droxidopa (NORTHERA) 100 MG capsule, Take 1 capsule (100 mg) by mouth 3 times daily., Disp: 90 capsule, Rfl: 0    gabapentin (NEURONTIN) 100 MG capsule, Take 300 mg by mouth nightly as needed for other (RLS). (Patient taking differently: Take 200 mg by mouth nightly as needed for other (RLS).), Disp: , Rfl:     lacosamide (VIMPAT) 50 MG TABS tablet, Take 1 tablet (50 mg) by mouth daily., Disp: 30 tablet, Rfl: 0    melatonin 3 MG tablet, Take 3 mg by mouth nightly as needed for sleep., Disp: , Rfl:     midodrine (PROAMATINE) 10 MG tablet, Take 2 tablets (20 mg) by mouth every 8 hours., Disp: 180 tablet, Rfl: 0    midodrine (PROAMATINE) 10 MG tablet, Take 1 tablet (10 mg) by mouth daily as needed (for use DURING dialysis, give if SBP < 95 after 1 hr into run)., Disp: 30 tablet, Rfl: 0    multivitamin RENAL (TRIPHROCAPS) 1 capsule capsule, TAKE 1 CAPSULE BY MOUTH DAILY, Disp: 90 capsule, Rfl: 3    naloxone (NARCAN) 4 MG/0.1ML nasal spray, Spray 1 spray (4 mg) into one nostril alternating nostrils as needed for opioid reversal. every 2-3 minutes until assistance arrives, Disp: 2 each, Rfl: 1    omeprazole (PRILOSEC) 40 MG DR capsule, TAKE 1 CAPSULE(40 MG) BY MOUTH DAILY, Disp: 90 capsule, Rfl: 1    oxyCODONE (ROXICODONE) 5 MG tablet, Take 1 tablet (5 mg) by mouth every 4 hours as needed for moderate to severe pain., Disp:  20 tablet, Rfl: 0    rOPINIRole (REQUIP) 0.5 MG tablet, Take 1 tablet (0.5 mg) by mouth 2 times daily. (Patient not taking: Reported on 7/8/2025), Disp: 60 tablet, Rfl: 0    tacrolimus (GENERIC EQUIVALENT) 1 MG capsule, Take 1 mg by mouth 2 times daily., Disp: , Rfl:     warfarin ANTICOAGULANT (COUMADIN/JANTOVEN) 2 MG tablet, Take 8 mg by mouth every Monday and Friday, 6 mg by mouth all other days, Disp: , Rfl:     SOCIAL HABITS:    History   Smoking Status    Never   Smokeless Tobacco    Never     Social History    Substance and Sexual Activity      Alcohol use: Not Currently        Alcohol/week: 0.0 standard drinks of alcohol      History   Drug Use No       FAMILY HISTORY:    Family History   Problem Relation Age of Onset    Coronary Artery Disease No family hx of     Cardiomyopathy No family hx of     Anesthesia Reaction No family hx of     Deep Vein Thrombosis (DVT) No family hx of        PE:  There were no vitals taken for this visit.  Wt Readings from Last 1 Encounters:   07/06/25 201 lb 8 oz (91.4 kg)     There is no height or weight on file to calculate BMI.      LABS:      Sodium   Date Value Ref Range Status   07/06/2025 130 (L) 135 - 145 mmol/L Final   07/05/2025 132 (L) 135 - 145 mmol/L Final   07/04/2025 130 (L) 135 - 145 mmol/L Final   04/20/2020 139 133 - 144 mmol/L Final   10/07/2019 131 (L) 133 - 144 mmol/L Final   02/20/2019 137 133 - 144 mmol/L Final     Sodium POCT   Date Value Ref Range Status   07/07/2025   Corrected     Comment:     Specimen Mislabeled, disregard these results  This is a corrected result. Previous result was 141 mmol/L on 7/7/2025 at  2:14 AM CDT   07/07/2025   Corrected     Comment:     CRITICAL VALUES NOTED AND REPORTED TO MD  This is a corrected result. Previous result was 141 mmol/L on 7/7/2025 at  1:11 AM CDT     Urea Nitrogen   Date Value Ref Range Status   07/06/2025 35.3 (H) 8.0 - 23.0 mg/dL Final   07/05/2025 20.0 8.0 - 23.0 mg/dL Final   07/04/2025 36.9 (H) 8.0 -  23.0 mg/dL Final   12/29/2022 46 (H) 7 - 30 mg/dL Final   12/28/2022 62 (H) 7 - 30 mg/dL Final   12/27/2022 52 (H) 7 - 30 mg/dL Final   04/20/2020 23 7 - 30 mg/dL Final   10/07/2019 18 7 - 30 mg/dL Final   02/20/2019 20 7 - 30 mg/dL Final     Hemoglobin   Date Value Ref Range Status   07/05/2025 8.7 (L) 13.3 - 17.7 g/dL Final   07/04/2025 8.4 (L) 13.3 - 17.7 g/dL Final   07/03/2025 8.8 (L) 13.3 - 17.7 g/dL Final   04/20/2020 14.3 13.3 - 17.7 g/dL Final   10/07/2019 13.7 13.3 - 17.7 g/dL Final   02/20/2019 13.7 13.3 - 17.7 g/dL Final     Hemoglobin POCT   Date Value Ref Range Status   07/07/2025   Corrected     Comment:     Specimen Mislabeled, disregard these results  This is a corrected result. Previous result was 9.7 g/dL on 7/7/2025 at  2:14 AM CDT   07/07/2025   Corrected     Comment:     Specimen Mislabeled, disregard these results  This is a corrected result. Previous result was 9.2 g/dL on 7/7/2025 at  1:11 AM CDT     Platelet Count   Date Value Ref Range Status   07/05/2025 96 (L) 150 - 450 10e3/uL Final   07/04/2025 86 (L) 150 - 450 10e3/uL Final   07/03/2025 112 (L) 150 - 450 10e3/uL Final   04/20/2020 108 (L) 150 - 450 10e9/L Final   10/07/2019 194 150 - 450 10e9/L Final   02/20/2019 125 (L) 150 - 450 10e9/L Final     INR   Date Value Ref Range Status   07/06/2025 1.92 (H) 0.85 - 1.15 Final   07/06/2025 1.93 (H) 0.85 - 1.15 Final   07/05/2025 1.64 (H) 0.85 - 1.15 Final   10/07/2019 1.20 (H) 0.86 - 1.14 Final   10/17/2017 1.26 (H) 0.86 - 1.14 Final   09/19/2017 1.15 (H) 0.86 - 1.14 Final       20 minutes spent on the day of encounter doing chart review, history and exam, documentation, and further activities as noted.     Saima Coker NP  VASCULAR SURGERY

## 2025-07-10 ENCOUNTER — TELEPHONE (OUTPATIENT)
Dept: FAMILY MEDICINE | Facility: CLINIC | Age: 60
End: 2025-07-10

## 2025-07-10 ENCOUNTER — TELEPHONE (OUTPATIENT)
Dept: OTHER | Facility: CLINIC | Age: 60
End: 2025-07-10

## 2025-07-10 ENCOUNTER — OFFICE VISIT (OUTPATIENT)
Dept: OTHER | Facility: CLINIC | Age: 60
End: 2025-07-10
Payer: COMMERCIAL

## 2025-07-10 ENCOUNTER — TELEPHONE (OUTPATIENT)
Dept: ANTICOAGULATION | Facility: CLINIC | Age: 60
End: 2025-07-10

## 2025-07-10 ENCOUNTER — ANTICOAGULATION THERAPY VISIT (OUTPATIENT)
Dept: ANTICOAGULATION | Facility: CLINIC | Age: 60
End: 2025-07-10

## 2025-07-10 ENCOUNTER — OFFICE VISIT (OUTPATIENT)
Dept: FAMILY MEDICINE | Facility: CLINIC | Age: 60
End: 2025-07-10
Payer: COMMERCIAL

## 2025-07-10 VITALS
TEMPERATURE: 98.4 F | DIASTOLIC BLOOD PRESSURE: 60 MMHG | HEART RATE: 67 BPM | RESPIRATION RATE: 18 BRPM | HEIGHT: 72 IN | WEIGHT: 199.8 LBS | OXYGEN SATURATION: 96 % | BODY MASS INDEX: 27.06 KG/M2 | SYSTOLIC BLOOD PRESSURE: 95 MMHG

## 2025-07-10 VITALS — HEART RATE: 69 BPM | SYSTOLIC BLOOD PRESSURE: 98 MMHG | DIASTOLIC BLOOD PRESSURE: 62 MMHG | TEMPERATURE: 97.9 F

## 2025-07-10 DIAGNOSIS — Z94.4 LIVER TRANSPLANTED (H): ICD-10-CM

## 2025-07-10 DIAGNOSIS — N18.6 ESRD (END STAGE RENAL DISEASE) ON DIALYSIS (H): ICD-10-CM

## 2025-07-10 DIAGNOSIS — I48.91 ATRIAL FIBRILLATION WITH RAPID VENTRICULAR RESPONSE (H): Primary | ICD-10-CM

## 2025-07-10 DIAGNOSIS — I48.91 ATRIAL FIBRILLATION WITH RAPID VENTRICULAR RESPONSE (H): ICD-10-CM

## 2025-07-10 DIAGNOSIS — T82.898A OTHER SPECIFIED COMPLICATION OF VASCULAR PROSTHETIC DEVICES, IMPLANTS AND GRAFTS, INITIAL ENCOUNTER: Primary | ICD-10-CM

## 2025-07-10 DIAGNOSIS — T82.898A OTHER SPECIFIED COMPLICATION OF VASCULAR PROSTHETIC DEVICES, IMPLANTS AND GRAFTS, INITIAL ENCOUNTER: ICD-10-CM

## 2025-07-10 DIAGNOSIS — I26.99 ACUTE PULMONARY EMBOLISM, UNSPECIFIED PULMONARY EMBOLISM TYPE, UNSPECIFIED WHETHER ACUTE COR PULMONALE PRESENT (H): ICD-10-CM

## 2025-07-10 DIAGNOSIS — I63.10 CEREBROVASCULAR ACCIDENT (CVA) DUE TO EMBOLISM OF PRECEREBRAL ARTERY (H): ICD-10-CM

## 2025-07-10 DIAGNOSIS — Z09 HOSPITAL DISCHARGE FOLLOW-UP: Primary | ICD-10-CM

## 2025-07-10 DIAGNOSIS — Z99.2 ESRD (END STAGE RENAL DISEASE) ON DIALYSIS (H): ICD-10-CM

## 2025-07-10 DIAGNOSIS — H61.23 BILATERAL IMPACTED CERUMEN: ICD-10-CM

## 2025-07-10 PROBLEM — R09.02 HYPOXIA: Status: RESOLVED | Noted: 2025-06-26 | Resolved: 2025-07-10

## 2025-07-10 PROBLEM — T78.2XXA ANAPHYLACTIC SHOCK, UNSPECIFIED, INITIAL ENCOUNTER: Status: RESOLVED | Noted: 2025-06-17 | Resolved: 2025-07-10

## 2025-07-10 PROBLEM — Z79.01 LONG TERM CURRENT USE OF ANTICOAGULANT THERAPY: Status: ACTIVE | Noted: 2024-05-03

## 2025-07-10 PROBLEM — J93.9 PNEUMOTHORAX ON LEFT: Status: RESOLVED | Noted: 2022-12-16 | Resolved: 2025-07-10

## 2025-07-10 PROBLEM — T78.40XA ALLERGY, UNSPECIFIED, INITIAL ENCOUNTER: Status: RESOLVED | Noted: 2025-06-17 | Resolved: 2025-07-10

## 2025-07-10 PROBLEM — B44.89: Status: RESOLVED | Noted: 2022-11-19 | Resolved: 2025-07-10

## 2025-07-10 PROBLEM — R65.21 SEPTIC SHOCK (H): Status: RESOLVED | Noted: 2023-10-23 | Resolved: 2025-07-10

## 2025-07-10 PROBLEM — Z98.890 STATUS POST CORONARY ANGIOGRAM: Status: RESOLVED | Noted: 2024-02-22 | Resolved: 2025-07-10

## 2025-07-10 PROBLEM — N18.4 STAGE 4 CHRONIC KIDNEY DISEASE (H): Status: ACTIVE | Noted: 2023-01-21

## 2025-07-10 PROBLEM — R91.8 PULMONARY INFILTRATES: Status: RESOLVED | Noted: 2023-10-05 | Resolved: 2025-07-10

## 2025-07-10 PROBLEM — J93.9 PNEUMOTHORAX: Status: RESOLVED | Noted: 2022-11-29 | Resolved: 2025-07-10

## 2025-07-10 PROBLEM — R79.89 ELEVATED TROPONIN: Status: RESOLVED | Noted: 2023-10-05 | Resolved: 2025-07-10

## 2025-07-10 PROBLEM — J44.9 CHRONIC OBSTRUCTIVE PULMONARY DISEASE, UNSPECIFIED (H): Status: ACTIVE | Noted: 2024-12-09

## 2025-07-10 PROBLEM — R55 NEAR SYNCOPE: Status: RESOLVED | Noted: 2025-06-26 | Resolved: 2025-07-10

## 2025-07-10 PROBLEM — A41.9 SEPTIC SHOCK (H): Status: RESOLVED | Noted: 2023-10-23 | Resolved: 2025-07-10

## 2025-07-10 PROBLEM — R06.02 SHORTNESS OF BREATH: Status: RESOLVED | Noted: 2025-06-22 | Resolved: 2025-07-10

## 2025-07-10 PROBLEM — T78.2XXA ANAPHYLACTIC SHOCK, UNSPECIFIED, INITIAL ENCOUNTER: Status: ACTIVE | Noted: 2025-06-17

## 2025-07-10 PROBLEM — R06.89 OTHER ABNORMALITIES OF BREATHING: Status: RESOLVED | Noted: 2025-06-17 | Resolved: 2025-07-10

## 2025-07-10 PROBLEM — B44.9 ASPERGILLUS PNEUMONIA (H): Status: RESOLVED | Noted: 2022-11-19 | Resolved: 2025-07-10

## 2025-07-10 PROBLEM — R06.89 OTHER ABNORMALITIES OF BREATHING: Status: ACTIVE | Noted: 2025-06-17

## 2025-07-10 PROBLEM — R52 PAIN, UNSPECIFIED: Status: ACTIVE | Noted: 2025-06-17

## 2025-07-10 PROBLEM — T78.40XA ALLERGY, UNSPECIFIED, INITIAL ENCOUNTER: Status: ACTIVE | Noted: 2025-06-17

## 2025-07-10 PROBLEM — N25.81 SECONDARY HYPERPARATHYROIDISM OF RENAL ORIGIN: Status: ACTIVE | Noted: 2025-06-17

## 2025-07-10 LAB — INR BLD: 3.8 (ref 0.9–1.1)

## 2025-07-10 PROCEDURE — G0463 HOSPITAL OUTPT CLINIC VISIT: HCPCS

## 2025-07-10 RX ORDER — OXYCODONE HYDROCHLORIDE 5 MG/1
5 TABLET ORAL EVERY 6 HOURS PRN
Qty: 12 TABLET | Refills: 0 | Status: SHIPPED | OUTPATIENT
Start: 2025-07-10 | End: 2025-07-10

## 2025-07-10 RX ORDER — OXYCODONE HYDROCHLORIDE 5 MG/1
5 TABLET ORAL EVERY 6 HOURS PRN
Qty: 12 TABLET | Refills: 0 | Status: SHIPPED | OUTPATIENT
Start: 2025-07-10

## 2025-07-10 ASSESSMENT — PAIN SCALES - GENERAL: PAINLEVEL_OUTOF10: SEVERE PAIN (7)

## 2025-07-10 NOTE — PROGRESS NOTES
Assessment & Plan     Hospital discharge follow-up  ESRD (end stage renal disease) on dialysis (H)  Other specified complication of vascular prosthetic devices, implants and grafts, initial encounter    Recovering well.  Increasing gabapentin has been helpful as well as discontinuation of ropinirole.  RLS is well-controlled.  Continue midodrine for dialysis days.  Doubt Droxidopa will be covered by insurance so we will manage with the midodrine for now which is working.  Additionally, filled out disability forms for him.  Requesting refill on oxycodone to help manage pain secondary to AV fistula revision.  Use sparingly and safely.    - oxyCODONE (ROXICODONE) 5 MG tablet  Dispense: 12 tablet; Refill: 0    Liver transplanted (H)  Close follow-up with transplant team    Atrial fibrillation with rapid ventricular response (H)  Cerebrovascular accident (CVA) due to embolism of precerebral artery (H)  Acute pulmonary embolism, unspecified pulmonary embolism type, unspecified whether acute cor pulmonale present (H)  Upcoming follow-up with cardiology at [facility].  Continue wearing heart monitor.  Decision in future about Watchman procedure and future of anticoagulation.  Await call from anticoagulation clinic today in regards to warfarin dosing.  - INR point of care (finger stick)    Bilateral impacted cerumen  Bilateral cerumen impaction was addressed with ear lavage.  Follow-up with ENT for decreased hearing if this persist.  - REMOVE IMPACTED CERUMEN    MED REC REQUIRED  Post Medication Reconciliation Status: discharge medications reconciled, continue medications without change  BMI  Estimated body mass index is 27.1 kg/m  as calculated from the following:    Height as of this encounter: 1.829 m (6').    Weight as of this encounter: 90.6 kg (199 lb 12.8 oz).   Weight management plan: Discussed healthy diet and exercise guidelines    The longitudinal plan of care for the diagnosis(es)/condition(s) as documented  were addressed during this visit. Due to the added complexity in care, I will continue to support Blanco in the subsequent management and with ongoing continuity of care.    Subjective   Blanco is a pleasant 60 year old, presenting for the following health issues:  Hospital F/U    Accompanied today by his wife Ofe.  Recovering well following recent hospital discharge.  Appetite stable.  Bowels moving regularly.  Awaiting INR clinic call - he is back on Warfarin. S/p revision of L AVF w/ Dr. Mcneil.  Not to be used for few more weeks.    Suffers from hypotension especially during dialysis.  Continues on midodrine 2 tablets 3 times daily. Attempted to get Droxidopa covered however this is looking less likely.  Off propranolol which can contribute to hypertension.  Has increased his gabapentin to 200 mg nightly which he is tolerating well.    Following up with cardiology.  Currently wearing a heart monitor.  Interested in pursuing Watchman procedure.    Will be following with transplant team down at [facility] as well try to get back on the kidney transplant list.    Notes decreased hearing in bilateral ears.  Plans to see ENT.    Needs thrivent forms filled out for continued disability claim.        7/10/2025    11:22 AM   Additional Questions   Roomed by Virginia GR   Accompanied by [facility] Follow-up Visit:    Hospital/Nursing Home/IP Rehab Facility: [facility]  Most Recent Admission Date: 6/27/2025   Most Recent Admission Diagnosis: NAFLD (nonalcoholic fatty liver disease) - K76.0     Most Recent Discharge Date: 7/6/2025   Most Recent Discharge Diagnosis: NAFLD (nonalcoholic fatty liver disease) - K76.0  ESRD (end stage renal disease) on dialysis (H) - N18.6, Z99.2  Liver transplanted (H) - Z94.4  Seizures (H) - R56.9  Restless legs syndrome (RLS) - G25.81  Acute pulmonary embolism, unspecified pulmonary embolism type, unspecified whether acute cor pulmonale  present (H) - I26.99  Malfunction of arteriovenous dialysis fistula, initial encounter - T82.590A   Do you have any other stressors you would like to discuss with your provider? Health Concerns    Problems taking medications regularly:  None  Medication changes since discharge: yes  Problems adhering to non-medication therapy:  None    Summary of hospitalization:  [facility] discharge summary reviewed  Diagnostic Tests/Treatments reviewed.  Follow up needed: Cardiology, renal, transplant  Other Healthcare Providers Involved in Patient s Care:         None  Update since discharge: improved.         Plan of care communicated with patient and family               Review of Systems  Constitutional, neuro, ENT, endocrine, pulmonary, cardiac, gastrointestinal, genitourinary, musculoskeletal, integument and psychiatric systems are negative, except as otherwise noted.      Objective    BP 95/60 (BP Location: Right arm, Patient Position: Sitting, Cuff Size: Adult Regular)   Pulse 67   Temp 98.4  F (36.9  C) (Temporal)   Resp 18   Ht 1.829 m (6')   Wt 90.6 kg (199 lb 12.8 oz)   SpO2 96%   BMI 27.10 kg/m    Body mass index is 27.1 kg/m .  Physical Exam   GENERAL: alert and no distress  EYES: Eyes grossly normal to inspection, PERRL and conjunctivae and sclerae normal  HENT: Cerumen impaction bilaterally, mild to moderate amount otherwise ear canals and TM's normal, nose and mouth without ulcers or lesions  NECK: no adenopathy, no asymmetry, masses, or scars  RESP: lungs clear to auscultation - no rales, rhonchi or wheezes  CV: regular rate and rhythm, normal S1 S2, no S3 or S4, no murmur, click or rub, no peripheral edema  ABDOMEN: soft, nontender  MS: no gross musculoskeletal defects noted, no edema  SKIN: no suspicious lesions or rashes.  AV fistula well-healing.  Bruising left forearm.  NEURO: Normal strength and tone, mentation intact and speech normal  PSYCH: mentation appears normal, affect  normal/bright    The likelihood of other entities in the differential is insufficient to justify any further testing for them at this time. This was explained to the patient. The patient was advised that persistent or worsening symptoms would require further evaluation. Patient advised to call the office and if unable to reach to go to the emergency room if they develop any new or worsening symptoms. Expressed understanding and agreement with above stated plan.         Signed Electronically by: Ki Carter PA-C

## 2025-07-10 NOTE — TELEPHONE ENCOUNTER
ANTICOAGULATION     Blanco Osborne is overdue for an INR check.     Care Everywhere updated: yes    Spoke with Blanco and lab appointment to be completed today 7/10/25 with PCP glenn Kumar, RN  7/10/2025  Anticoagulation Clinic  [facility] for routing messages: vashti MCGREGOR  [facility] patient phone line: [phone]

## 2025-07-10 NOTE — PROGRESS NOTES
ANTICOAGULATION MANAGEMENT     Blanco Osborne 60 year old male is on warfarin with supratherapeutic INR result. (Goal INR 2.0-3.0)    Recent labs: (last 7 days)     07/10/25  1112   INR 3.8*       ASSESSMENT     Source(s): Chart Review and Patient/Caregiver Call     Warfarin doses taken: Warfarin taken as instructed  Diet: No new diet changes identified  Medication/supplement changes: None noted  New illness, injury, or hospitalization: Yes: IP 6/26-7/6. See ADT  Signs or symptoms of bleeding or clotting: No  Previous result: Subtherapeutic  Additional findings: None       PLAN     Recommended plan for ongoing change(s) affecting INR     Dosing Instructions: decrease your warfarin dose (8.7% change) with next INR in 2 weeks       Summary  As of 7/10/2025      Full warfarin instructions:  6 mg every day   Next INR check:  7/24/2025               Telephone call with Blanco who verbalizes understanding and agrees to plan    Lab visit scheduled    Education provided: Please call back if any changes to your diet, medications or how you've been taking warfarin    Plan made per [facility] anticoagulation protocol    Scott Monahan RN  7/10/2025  Anticoagulation Clinic  [facility] for routing messages: vashti MCGREGOR  [facility] patient phone line: [phone]        _______________________________________________________________________     Anticoagulation Episode Summary       Current INR goal:  2.0-3.0   TTR:  0.0% (3.4 mo)   Target end date:  Indefinite   Send INR reminders to:  JOAN MCGREGOR    Indications    Atrial fibrillation with rapid ventricular response (H) [I48.91]  Cerebrovascular accident (CVA) due to embolism of precerebral artery (H) [I63.10]  Acute pulmonary embolism  unspecified pulmonary embolism type  unspecified whether acute cor pulmonale present (H) [I26.99]             Comments:  --             Anticoagulation Care Providers       Provider Role Specialty Phone number    Ki Carter PA-C  Referring Physician Assistant - Medical [phone]

## 2025-07-10 NOTE — NURSING NOTE
Patient identified using two patient identifiers.  Ear exam showing wax occlusion completed by provider.  Solution: warm water was placed in the bilateral ear(s) via RHINO EAR.

## 2025-07-10 NOTE — PATIENT INSTRUCTIONS
History     Chief Complaint   Patient presents with     Eye Drainage     bilateral     HPI  Vera Welsh is a 1 year old female with history of asthma who is accompanied by her parents for evaluation of bilateral eye drainage. Patient developed cough, nasal congestion, and wheezing over the last week- started on Prednisone and nebulizer as prescribed by her PCP. Improvement in wheezing. Tolerating fluids well. Over the weekend copious amounts of purulent drainage started from both eyes. No fever.     Problem List:    Patient Active Problem List    Diagnosis Date Noted     Chronic cough 2017     Priority: Medium     Saw Dr. Flory Santos, Peds Pulm, [facility]  Started on QVAR inhaler in May 2017  Recommended follow up in 2017       Hx of wheezing 2017     Priority: Medium     Referred to Peds Pulmonology in 2017  Prednisolone in 17 - wheezing with influenza A. Improvement with albuterol.        Tongue tie 2015     Priority: Medium      infant 2015     Priority: Medium        Past Medical History:    History reviewed. No pertinent past medical history.    Past Surgical History:    History reviewed. No pertinent surgical history.    Family History:    No family history on file.    Social History:  Marital Status:  Single [1]  Social History   Substance Use Topics     Smoking status: Never Smoker     Smokeless tobacco: Not on file     Alcohol use Not on file        Medications:      trimethoprim-polymyxin b (POLYTRIM) ophthalmic solution   ondansetron (ZOFRAN) 4 MG/5ML solution   albuterol (2.5 MG/3ML) 0.083% neb solution   albuterol (PROAIR HFA/PROVENTIL HFA/VENTOLIN HFA) 108 (90 BASE) MCG/ACT Inhaler   acetaminophen (TYLENOL) 120 MG suppository         Review of Systems  As mentioned above in the history present illness. All other systems were reviewed and are negative.    Physical Exam   Heart Rate: 125  Temp: 98.4  F (36.9  [facility]    [address] [id number] [phone]  Main Phone      C)  Resp: 20  Weight: 12.8 kg (28 lb 3.2 oz)  SpO2: 98 %      Physical Exam  Appearance: Alert and appropriate, well developed, ill appearing but nontoxic, with moist mucous membranes. Playful in room.  HEENT: Head: Normocephalic and atraumatic. Eyes: PERRL, EOM grossly intact, copious amounts of purulent drainage from both eyes. No surrounding bettina-orbital edema or erythema. Ears: Tympanic membranes clear bilaterally, without inflammation or effusion. Nose: Rhinorrhea present.  Mouth/Throat: No oral lesions, pharynx clear with no erythema or exudate.  Neck: Supple, no masses, no meningismus. No significant cervical lymphadenopathy.  Pulmonary: No grunting, flaring, retractions or stridor. Good air entry, clear to auscultation bilaterally, with no rales, rhonchi, or wheezing.  Cardiovascular: Regular rate and rhythm, normal S1 and S2, with no murmurs.         ED Course     ED Course     Procedures               Labs Ordered and Resulted from Time of ED Arrival Up to the Time of Departure from the ED - No data to display    Assessments & Plan (with Medical Decision Making)     I have reviewed the nursing notes.    I have reviewed the findings, diagnosis, plan and need for follow up with the patient.    Discharge Medication List as of 1/29/2018  5:41 PM      START taking these medications    Details   trimethoprim-polymyxin b (POLYTRIM) ophthalmic solution Place 2 drops into both eyes every 4 hours (while awake) for 7 days, Disp-4 mL, R-0, E-Prescribe             Final diagnoses:   Acute conjunctivitis of both eyes, unspecified acute conjunctivitis type   Viral URI with cough       1/29/2018   [facility] EMERGENCY DEPARTMENT     Louise Graves APRN CNP  01/29/18 [id number]

## 2025-07-10 NOTE — PROGRESS NOTES
[facility]        Patient is here for a post-op.    Pt is currently taking Aspirin and Warfarin.    BP 98/62 (BP Location: Right arm, Patient Position: Chair, Cuff Size: Adult Regular)   Pulse 69   Temp 97.9  F (36.6  C) (Oral)     The provider has been notified that the patient has no concerns.     Questions patient would like addressed today are: N/A.    Refills are needed: N/A    Has homecare services and agency name:  Parul Valdez MA

## 2025-07-10 NOTE — TELEPHONE ENCOUNTER
Completed     [facility] [employer] Form faxed. Copy sent to [employer] and placed in [employer] accordion file

## 2025-07-10 NOTE — TELEPHONE ENCOUNTER
Per 7/10/25 visit with STEFFANY Neal CNP, pt needs the following:     In clinic appt with Dr. Mcneil for tunneled catheter removal   Please schedule this in approximately 3-4 weeks    Routing to scheduling to contact patient to coordinate above.    Appt note in order comments.    Kaela Cam RN  [facility]  Office: [phone]  Fax: [phone]

## 2025-07-14 ENCOUNTER — PATIENT OUTREACH (OUTPATIENT)
Dept: CARE COORDINATION | Facility: CLINIC | Age: 60
End: 2025-07-14
Payer: COMMERCIAL

## 2025-07-15 ENCOUNTER — VIRTUAL VISIT (OUTPATIENT)
Dept: PHARMACY | Facility: CLINIC | Age: 60
End: 2025-07-15
Payer: COMMERCIAL

## 2025-07-15 ENCOUNTER — TELEPHONE (OUTPATIENT)
Dept: TRANSPLANT | Facility: CLINIC | Age: 60
End: 2025-07-15
Payer: COMMERCIAL

## 2025-07-15 DIAGNOSIS — I25.10 CORONARY ARTERY DISEASE INVOLVING NATIVE CORONARY ARTERY OF NATIVE HEART WITHOUT ANGINA PECTORIS: ICD-10-CM

## 2025-07-15 DIAGNOSIS — I95.9 HYPOTENSION, UNSPECIFIED HYPOTENSION TYPE: Primary | ICD-10-CM

## 2025-07-15 DIAGNOSIS — Z78.9 TAKES DIETARY SUPPLEMENTS: ICD-10-CM

## 2025-07-15 DIAGNOSIS — G25.81 RESTLESS LEGS SYNDROME (RLS): ICD-10-CM

## 2025-07-15 DIAGNOSIS — I63.10 CEREBROVASCULAR ACCIDENT (CVA) DUE TO EMBOLISM OF PRECEREBRAL ARTERY (H): ICD-10-CM

## 2025-07-15 DIAGNOSIS — Z94.4 LIVER REPLACED BY TRANSPLANT (H): Primary | ICD-10-CM

## 2025-07-15 DIAGNOSIS — R56.9 SEIZURES (H): ICD-10-CM

## 2025-07-15 DIAGNOSIS — Z94.4 LIVER TRANSPLANTED (H): ICD-10-CM

## 2025-07-15 DIAGNOSIS — Z99.2 ESRD (END STAGE RENAL DISEASE) ON DIALYSIS (H): ICD-10-CM

## 2025-07-15 DIAGNOSIS — R52 PAIN: ICD-10-CM

## 2025-07-15 DIAGNOSIS — K76.0 NAFLD (NONALCOHOLIC FATTY LIVER DISEASE): ICD-10-CM

## 2025-07-15 DIAGNOSIS — Z86.711 HISTORY OF PULMONARY EMBOLISM: ICD-10-CM

## 2025-07-15 DIAGNOSIS — K21.00 GASTROESOPHAGEAL REFLUX DISEASE WITH ESOPHAGITIS, UNSPECIFIED WHETHER HEMORRHAGE: ICD-10-CM

## 2025-07-15 DIAGNOSIS — Z94.4 LIVER TRANSPLANTED (H): Primary | ICD-10-CM

## 2025-07-15 DIAGNOSIS — I48.91 ATRIAL FIBRILLATION WITH RAPID VENTRICULAR RESPONSE (H): ICD-10-CM

## 2025-07-15 DIAGNOSIS — N18.6 ESRD (END STAGE RENAL DISEASE) ON DIALYSIS (H): ICD-10-CM

## 2025-07-15 PROCEDURE — 99207 PR NO CHARGE LOS: CPT | Mod: 93 | Performed by: PHARMACIST

## 2025-07-15 RX ORDER — MIDODRINE HYDROCHLORIDE 10 MG/1
20 TABLET ORAL EVERY 8 HOURS
Qty: 540 TABLET | Refills: 1 | Status: SHIPPED | OUTPATIENT
Start: 2025-07-15

## 2025-07-15 RX ORDER — WARFARIN SODIUM 2 MG/1
TABLET ORAL
Qty: 270 TABLET | Refills: 0 | Status: SHIPPED | OUTPATIENT
Start: 2025-07-15

## 2025-07-15 RX ORDER — TACROLIMUS 1 MG/1
1 CAPSULE ORAL 2 TIMES DAILY
Qty: 60 CAPSULE | Refills: 1 | Status: SHIPPED | OUTPATIENT
Start: 2025-07-15

## 2025-07-15 NOTE — TELEPHONE ENCOUNTER
----- Message from Valerie Patterson sent at 7/15/2025  8:47 AM CDT -----  Regarding: Tacrolimus refill and level  Hi - I met with Blanco payne for MTM. He needs a refill on tacrolimus 1 mg twice daily and also needs help setting up a tacrolimus level, can you help with this?    Thanks,  Veena Patterson, PharmD  Medication Therapy Management Pharmacist

## 2025-07-15 NOTE — PROGRESS NOTES
Medication Therapy Management (MTM) Encounter    ASSESSMENT:                            Medication Adherence/Access: No issues identified.    Hypotension:    Midodrine is enough to maintain blood pressure, droxidopa is not needed.    ESRD:   Plan in place.     Liver Transplant (9/2016):    Will follow-up with transplant team for tacrolimus refill and level.    RLS:   Will assess at follow-up.    Afib/History of CVA/Pulmonary Embolism/CAD: Per chart review 7/10 anticoagulation encounter notes dose of warfarin 6 mg daily - Blanco confirms this is correct, refill sent.    Seizures:   Stable.    Pain s/p fistula surgery:    Stable.    Insomnia:  No changes.    GERD  Stable.    PLAN:                            Veena will ask Dr. Simms and Jerrica to send refill for tacrolimus 1 mg twice daily -follow-up with Jerrica about scheduling a tacrolimus level.  Warfarin dose should be 6 mg daily, refill sent.  Refill sent for midodrine 20 mg three times daily.     Message sent to Dr. Simms and Jerrica Almaraz.    Follow-up: Return in about 2 weeks (around 7/29/2025) for Medication Therapy Management.    SUBJECTIVE/OBJECTIVE:                          Blanco Osborne is a 60 year old male seen for a follow-up visit. Patient was accompanied by wife Ofe.      Reason for visit: med review.    Allergies/ADRs: Reviewed in chart  Past Medical History: Reviewed in chart  Tobacco: He reports that he has never smoked. He has never been exposed to tobacco smoke. He has never used smokeless tobacco.  Alcohol: none    Medication Adherence/Access:   Patient takes medications directly from bottles.  Patient takes medications 2-4 time(s) per day.   Per patient, misses medication 0 time(s) per week.   The patient fills medications at [facility]: NO, fills medications at [facility].    Hypotension:    Midodrine 20 mg three times daily - needs refill    Blood pressure is maintaining, instead of in , it is 100-110s.   (Prior to hospitalization he was  taking midodrine 10 mg only as needed before dialysis and after if needed.   Droxidopa was prescribed at discharge - prior authorization denied.  Denies side effects.     ESRD:   M,W,F dialysis since 1/23  Renal triphocaps 1 daily     Currently being evaluated at [facility] for kidney transplant   Denies side effects.    Liver Transplant (9/2016):    Tacrolimus 1 mg every morning, 1 mg every evening - he needs a refill on this    Per communication with Dr. Simms patient should be on tacrolimus 1 mg twice daily and should have a trough level drawn.  Side effects: hand tremor from tacrolimus  Tx Coordinator: Charu Herrera MD: Dr. Simms    RLS:   Gabapentin 200 mg at bedtime     Not taking ropinirole (?contributed to low blood pressure).    Afib/History of CVA/Pulmonary Embolism/CAD:  Warfarin - Blanco can't recall what dose he should be on or what he is taking, but he needs a refill. Ofe doesn't talk with anticoagulation nurses when they call so she's not sure either.  Aspirin 81 mg daily     They were told conflicting information between [facility] that the warfarin would be easier to reverse for kidney transplant, and that [facility] said that wasn't true, could go back to Eliquis.     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 Plan in place  to get INR drawn tomorrow.     Patient reports no current medication side effects.    Patient does not have a history of GI bleed.     Seizures:  Lacosamide 50mg daily    Seizures occurred in the past, has been on higher doses of lacosamide in the past per chart review.    Pain s/p fistula surgery:    Oxycodone 5 mg as needed  Acetaminophen 500 mg as needed  Narcan -haven't picked up    He was alternating these during the day for pain, taking acetaminophen for sure at bedtime as it lasts longer.  He knows he can't take this when he  drives.    Insomnia:  Melatonin 3 mg at bedtime     Sometimes he gets bad dreams.     GERD  Omeprazole 40 mg once daily   Tums as needed    Patient feels that current regimen is effective.  The patient does not have a history of GI bleed.    Today's Vitals: There were no vitals taken for this visit.  ----------------  Post Discharge Medication Reconciliation Status: discharge medications reconciled, continue medications without change.    I spent 20 minutes with this patient today. All changes were made via collaborative practice agreement with Ki Carter PA-C.     A summary of these recommendations was mailed to the patient.    Veena Patterson, BitaD  Medication Therapy Management Pharmacist      Telemedicine Visit Details  The patient's medications can be safely assessed via a telemedicine encounter.  Type of service:  Telephone visit  Originating Location (pt. Location): Home    Distant Location (provider location):  Off-site  Start Time: 8:32 AM  End Time: 8:52 AM     Medication Therapy Recommendations  History of pulmonary embolism   1 Current Medication: warfarin ANTICOAGULANT (COUMADIN/JANTOVEN) 2 MG tablet   Current Medication Sig: Take 6 mg by mouth every day.   Rationale: Does not understand instructions - Adherence - Adherence   Recommendation: Provide Education   Status: Patient Agreed - Adherence/Education   Identified Date: 7/15/2025 Completed Date: 7/15/2025         Liver transplanted (H)   1 Current Medication: tacrolimus (GENERIC EQUIVALENT) 1 MG capsule   Current Medication Sig: Take 1 capsule (1 mg) by mouth 2 times daily.   Rationale: Medication product not available - Adherence - Adherence   Recommendation: Provide Adherence Intervention   Status: Patient Agreed - Adherence/Education   Identified Date: 7/15/2025 Completed Date: 7/15/2025

## 2025-07-15 NOTE — PATIENT INSTRUCTIONS
"Recommendations from today's MTM visit:                                                       Veena will ask Dr. Simms and Jerrica to send refill for tacrolimus 1 mg twice daily -follow-up with Jerrica about scheduling a tacrolimus level.  Warfarin dose should be 6 mg daily, refill sent.  Refill sent for midodrine 20 mg three times daily.       Follow-up: Return in about 2 weeks (around 7/29/2025) for Medication Therapy Management.    It was great speaking with you today.  I value your experience and would be very thankful for your time in providing feedback in our clinic survey. In the next few days, you may receive an email or text message from [employer] with a link to a survey related to your  clinical pharmacist.\"     To schedule another MTM appointment, please call the clinic directly or you may call the MTM scheduling line at [phone].    My Clinical Pharmacist's contact information:                                                      Please feel free to contact me with any questions or concerns you have.      Veena Patterson, PharmD  Medication Therapy Management Pharmacist     "

## 2025-07-16 ENCOUNTER — PATIENT OUTREACH (OUTPATIENT)
Dept: CARE COORDINATION | Facility: CLINIC | Age: 60
End: 2025-07-16
Payer: COMMERCIAL

## 2025-07-21 ENCOUNTER — TELEPHONE (OUTPATIENT)
Dept: TRANSPLANT | Facility: CLINIC | Age: 60
End: 2025-07-21
Payer: COMMERCIAL

## 2025-07-21 DIAGNOSIS — K76.0 NAFLD (NONALCOHOLIC FATTY LIVER DISEASE): ICD-10-CM

## 2025-07-21 NOTE — TELEPHONE ENCOUNTER
Received voicemail from pt. He states he saw Dr. Simms while in the hospital, was told he had a clot in his fistula and is now doing much better. Would like to be active again. Reviewed pt needs admission reviewed at upcoming visit with neph HARRY. Will review items needed after visit completed. Left  with direct line for return call.

## 2025-07-22 ENCOUNTER — TELEPHONE (OUTPATIENT)
Dept: TRANSPLANT | Facility: CLINIC | Age: 60
End: 2025-07-22

## 2025-07-22 DIAGNOSIS — K76.0 NAFLD (NONALCOHOLIC FATTY LIVER DISEASE): ICD-10-CM

## 2025-07-22 NOTE — TELEPHONE ENCOUNTER
Patient Call: General  Route to LPN    Reason for call: Patient returning call back. They are also looking to see how to setup evaluation appointment. More details with call back.     Call back needed? Yes    Return Call Needed  Same as documented in contacts section  When to return call?: Same day: Route High Priority

## 2025-07-23 NOTE — TELEPHONE ENCOUNTER
Returned call to pt. Reviewed [facility] appt is scheduled for 8/7/25. Pt notified RNCC he currently has a heart monitor that will be done around that time as well. Will discuss next steps after [facility] visit completed. Pt verbalized understanding of information and has no further questions. Encouraged to reach out if questions arise.

## 2025-07-31 ENCOUNTER — TELEPHONE (OUTPATIENT)
Dept: ANTICOAGULATION | Facility: CLINIC | Age: 60
End: 2025-07-31
Payer: COMMERCIAL

## 2025-07-31 NOTE — TELEPHONE ENCOUNTER
ANTICOAGULATION     Blanco Osborne is overdue for an INR check.     Care Everywhere updated: yes    Spoke with Blanco and scheduled lab appointment on 8/7/25    Scott Monahan RN  7/31/2025  Anticoagulation Clinic  [facility] for routing messages: vashti MCGREGOR  [facility] patient phone line: [phone]

## 2025-08-01 ENCOUNTER — OFFICE VISIT (OUTPATIENT)
Dept: GASTROENTEROLOGY | Facility: CLINIC | Age: 60
End: 2025-08-01
Attending: INTERNAL MEDICINE
Payer: COMMERCIAL

## 2025-08-01 ENCOUNTER — TELEPHONE (OUTPATIENT)
Dept: TRANSPLANT | Facility: CLINIC | Age: 60
End: 2025-08-01

## 2025-08-01 VITALS
WEIGHT: 199.3 LBS | DIASTOLIC BLOOD PRESSURE: 66 MMHG | OXYGEN SATURATION: 97 % | HEART RATE: 66 BPM | SYSTOLIC BLOOD PRESSURE: 100 MMHG | BODY MASS INDEX: 27.03 KG/M2

## 2025-08-01 DIAGNOSIS — N18.4 STAGE 4 CHRONIC KIDNEY DISEASE (H): ICD-10-CM

## 2025-08-01 DIAGNOSIS — K76.0 NAFLD (NONALCOHOLIC FATTY LIVER DISEASE): ICD-10-CM

## 2025-08-01 DIAGNOSIS — Z94.4 LIVER REPLACED BY TRANSPLANT (H): Primary | ICD-10-CM

## 2025-08-01 PROCEDURE — 99215 OFFICE O/P EST HI 40 MIN: CPT | Mod: GC | Performed by: INTERNAL MEDICINE

## 2025-08-01 PROCEDURE — G0463 HOSPITAL OUTPT CLINIC VISIT: HCPCS | Performed by: INTERNAL MEDICINE

## 2025-08-01 PROCEDURE — 3078F DIAST BP <80 MM HG: CPT | Performed by: INTERNAL MEDICINE

## 2025-08-01 PROCEDURE — 3074F SYST BP LT 130 MM HG: CPT | Performed by: INTERNAL MEDICINE

## 2025-08-01 PROCEDURE — 80197 ASSAY OF TACROLIMUS: CPT | Performed by: INTERNAL MEDICINE

## 2025-08-01 PROCEDURE — 99000 SPECIMEN HANDLING OFFICE-LAB: CPT | Performed by: PATHOLOGY

## 2025-08-07 ENCOUNTER — LAB (OUTPATIENT)
Dept: LAB | Facility: CLINIC | Age: 60
End: 2025-08-07
Payer: COMMERCIAL

## 2025-08-07 ENCOUNTER — OFFICE VISIT (OUTPATIENT)
Dept: TRANSPLANT | Facility: CLINIC | Age: 60
End: 2025-08-07
Attending: NURSE PRACTITIONER
Payer: COMMERCIAL

## 2025-08-07 VITALS
TEMPERATURE: 97.8 F | HEART RATE: 81 BPM | OXYGEN SATURATION: 100 % | DIASTOLIC BLOOD PRESSURE: 66 MMHG | WEIGHT: 202.7 LBS | SYSTOLIC BLOOD PRESSURE: 105 MMHG | BODY MASS INDEX: 27.49 KG/M2

## 2025-08-07 DIAGNOSIS — K76.0 NAFLD (NONALCOHOLIC FATTY LIVER DISEASE): ICD-10-CM

## 2025-08-07 DIAGNOSIS — Z79.01 ANTICOAGULATED: ICD-10-CM

## 2025-08-07 DIAGNOSIS — Z76.82 ORGAN TRANSPLANT CANDIDATE: ICD-10-CM

## 2025-08-07 DIAGNOSIS — N18.6 ESRD (END STAGE RENAL DISEASE) (H): Primary | ICD-10-CM

## 2025-08-07 PROCEDURE — G0463 HOSPITAL OUTPT CLINIC VISIT: HCPCS | Performed by: NURSE PRACTITIONER

## 2025-08-07 ASSESSMENT — PAIN SCALES - GENERAL: PAINLEVEL_OUTOF10: MODERATE PAIN (4)

## 2025-08-12 ENCOUNTER — VIRTUAL VISIT (OUTPATIENT)
Dept: PHARMACY | Facility: CLINIC | Age: 60
End: 2025-08-12
Payer: COMMERCIAL

## 2025-08-12 DIAGNOSIS — G25.81 RESTLESS LEGS SYNDROME (RLS): ICD-10-CM

## 2025-08-12 DIAGNOSIS — Z99.2 ESRD (END STAGE RENAL DISEASE) ON DIALYSIS (H): ICD-10-CM

## 2025-08-12 DIAGNOSIS — R56.9 SEIZURES (H): ICD-10-CM

## 2025-08-12 DIAGNOSIS — R52 PAIN: ICD-10-CM

## 2025-08-12 DIAGNOSIS — Z94.4 LIVER TRANSPLANTED (H): ICD-10-CM

## 2025-08-12 DIAGNOSIS — I95.9 HYPOTENSION, UNSPECIFIED HYPOTENSION TYPE: Primary | ICD-10-CM

## 2025-08-12 DIAGNOSIS — I48.91 ATRIAL FIBRILLATION WITH RAPID VENTRICULAR RESPONSE (H): ICD-10-CM

## 2025-08-12 DIAGNOSIS — K21.00 GASTROESOPHAGEAL REFLUX DISEASE WITH ESOPHAGITIS, UNSPECIFIED WHETHER HEMORRHAGE: ICD-10-CM

## 2025-08-12 DIAGNOSIS — I25.10 CORONARY ARTERY DISEASE INVOLVING NATIVE CORONARY ARTERY OF NATIVE HEART WITHOUT ANGINA PECTORIS: ICD-10-CM

## 2025-08-12 DIAGNOSIS — I63.10 CEREBROVASCULAR ACCIDENT (CVA) DUE TO EMBOLISM OF PRECEREBRAL ARTERY (H): ICD-10-CM

## 2025-08-12 DIAGNOSIS — Z86.711 HISTORY OF PULMONARY EMBOLISM: ICD-10-CM

## 2025-08-12 DIAGNOSIS — L29.9 ITCHING: ICD-10-CM

## 2025-08-12 DIAGNOSIS — N18.6 ESRD (END STAGE RENAL DISEASE) ON DIALYSIS (H): ICD-10-CM

## 2025-08-12 DIAGNOSIS — Z78.9 TAKES DIETARY SUPPLEMENTS: ICD-10-CM

## 2025-08-12 PROCEDURE — 99207 PR NO CHARGE LOS: CPT | Mod: 93 | Performed by: PHARMACIST

## 2025-08-12 RX ORDER — GABAPENTIN 100 MG/1
300 CAPSULE ORAL AT BEDTIME
Qty: 270 CAPSULE | Refills: 1 | Status: SHIPPED | OUTPATIENT
Start: 2025-08-12

## 2025-08-12 RX ORDER — MIDODRINE HYDROCHLORIDE 10 MG/1
10-20 TABLET ORAL 3 TIMES DAILY PRN
Qty: 540 TABLET | Refills: 1 | Status: SHIPPED | OUTPATIENT
Start: 2025-08-12

## 2025-08-12 RX ORDER — TRAZODONE HYDROCHLORIDE 50 MG/1
50 TABLET ORAL
COMMUNITY
End: 2025-08-14

## 2025-08-12 RX ORDER — SEVELAMER HYDROCHLORIDE 800 MG/1
800 TABLET, FILM COATED ORAL DAILY PRN
COMMUNITY

## 2025-08-13 ENCOUNTER — LAB (OUTPATIENT)
Dept: LAB | Facility: CLINIC | Age: 60
End: 2025-08-13
Payer: COMMERCIAL

## 2025-08-13 ENCOUNTER — ANTICOAGULATION THERAPY VISIT (OUTPATIENT)
Dept: ANTICOAGULATION | Facility: CLINIC | Age: 60
End: 2025-08-13

## 2025-08-13 DIAGNOSIS — I63.10 CEREBROVASCULAR ACCIDENT (CVA) DUE TO EMBOLISM OF PRECEREBRAL ARTERY (H): ICD-10-CM

## 2025-08-13 DIAGNOSIS — I48.91 ATRIAL FIBRILLATION WITH RAPID VENTRICULAR RESPONSE (H): Primary | ICD-10-CM

## 2025-08-13 DIAGNOSIS — I48.91 ATRIAL FIBRILLATION WITH RAPID VENTRICULAR RESPONSE (H): ICD-10-CM

## 2025-08-13 DIAGNOSIS — I26.99 ACUTE PULMONARY EMBOLISM, UNSPECIFIED PULMONARY EMBOLISM TYPE, UNSPECIFIED WHETHER ACUTE COR PULMONALE PRESENT (H): ICD-10-CM

## 2025-08-13 LAB — INR BLD: 1.6 (ref 0.9–1.1)

## 2025-08-13 PROCEDURE — 85610 PROTHROMBIN TIME: CPT

## 2025-08-13 PROCEDURE — 36416 COLLJ CAPILLARY BLOOD SPEC: CPT

## 2025-08-14 ENCOUNTER — RESULTS FOLLOW-UP (OUTPATIENT)
Dept: ANTICOAGULATION | Facility: CLINIC | Age: 60
End: 2025-08-14
Payer: COMMERCIAL

## 2025-08-25 ENCOUNTER — TELEPHONE (OUTPATIENT)
Dept: TRANSPLANT | Facility: CLINIC | Age: 60
End: 2025-08-25
Payer: COMMERCIAL

## 2025-08-25 DIAGNOSIS — Z01.810 PRE-OPERATIVE CARDIOVASCULAR EXAMINATION: ICD-10-CM

## 2025-08-25 DIAGNOSIS — K76.0 NAFLD (NONALCOHOLIC FATTY LIVER DISEASE): ICD-10-CM

## 2025-08-25 DIAGNOSIS — N18.6 ESRD (END STAGE RENAL DISEASE) (H): Primary | ICD-10-CM

## 2025-08-25 DIAGNOSIS — Z76.82 ORGAN TRANSPLANT CANDIDATE: ICD-10-CM

## 2025-08-28 ENCOUNTER — TELEPHONE (OUTPATIENT)
Dept: ANTICOAGULATION | Facility: CLINIC | Age: 60
End: 2025-08-28
Payer: COMMERCIAL

## 2025-09-02 ENCOUNTER — OFFICE VISIT (OUTPATIENT)
Dept: OTHER | Facility: CLINIC | Age: 60
End: 2025-09-02
Attending: SURGERY
Payer: COMMERCIAL

## 2025-09-02 VITALS — DIASTOLIC BLOOD PRESSURE: 69 MMHG | HEART RATE: 88 BPM | SYSTOLIC BLOOD PRESSURE: 105 MMHG

## 2025-09-02 DIAGNOSIS — Z99.2 ESRD (END STAGE RENAL DISEASE) ON DIALYSIS (H): Primary | ICD-10-CM

## 2025-09-02 DIAGNOSIS — N18.6 ESRD (END STAGE RENAL DISEASE) ON DIALYSIS (H): Primary | ICD-10-CM

## 2025-09-02 PROCEDURE — 3078F DIAST BP <80 MM HG: CPT

## 2025-09-02 PROCEDURE — 3074F SYST BP LT 130 MM HG: CPT

## 2025-09-03 ENCOUNTER — COMMITTEE REVIEW (OUTPATIENT)
Dept: TRANSPLANT | Facility: CLINIC | Age: 60
End: 2025-09-03
Payer: COMMERCIAL

## 2025-09-03 ENCOUNTER — TELEPHONE (OUTPATIENT)
Dept: TRANSPLANT | Facility: CLINIC | Age: 60
End: 2025-09-03
Payer: COMMERCIAL

## 2025-09-03 DIAGNOSIS — K76.0 NAFLD (NONALCOHOLIC FATTY LIVER DISEASE): ICD-10-CM

## 2025-09-04 ENCOUNTER — TELEPHONE (OUTPATIENT)
Dept: ANTICOAGULATION | Facility: CLINIC | Age: 60
End: 2025-09-04
Payer: COMMERCIAL

## (undated) DEVICE — SU SILK 3-0 TIE 24" SA74H

## (undated) DEVICE — BNDG ROLLER GAUZE CONFORM 4"X4YD 41-54

## (undated) DEVICE — NDL 19GA 1.5"

## (undated) DEVICE — INTRODUCER CATH VASC 5FRX10CM  MPIS-501-NT-U-SST

## (undated) DEVICE — SYR 20ML LL W/O NDL 302830

## (undated) DEVICE — SOL NACL 0.9% IRRIG 1000ML BOTTLE 2F7124

## (undated) DEVICE — SU ETHILON 3-0 FS-1 18" 669H

## (undated) DEVICE — ESU GROUND PAD ADULT W/CORD E7507

## (undated) DEVICE — NDL ANGIOCATH 20GA 1.25" 4056

## (undated) DEVICE — KIT TURNOVER FAIRVIEW SOUTHDALE FULL SP3889

## (undated) DEVICE — SUCTION MANIFOLD NEPTUNE 2 SYS 4 PORT 0702-020-000

## (undated) DEVICE — BLADE KNIFE SURG 15 371115

## (undated) DEVICE — ESU GROUND PAD UNIVERSAL W/O CORD

## (undated) DEVICE — PREP CHLORAPREP 26ML TINTED HI-LITE ORANGE 930815

## (undated) DEVICE — ESU PENCIL W/HOLSTER E2350H

## (undated) DEVICE — SYR 03ML LL W/O NDL 309657

## (undated) DEVICE — ENDO TRAP POLYP E-TRAP 00711099

## (undated) DEVICE — MANIFOLD KIT ANGIO AUTOMATED 014613

## (undated) DEVICE — SYR 10ML SLIP TIP W/O NDL

## (undated) DEVICE — SHTH INTRO 0.021IN ID 6FR DIA

## (undated) DEVICE — LINEN TOWEL PACK X5 5464

## (undated) DEVICE — SU SILK 4-0 TIE 24" SA73H

## (undated) DEVICE — DEVICE TORQUE TD II COR H74904487202

## (undated) DEVICE — DRAPE MINOR PROCEDURE LAP 29496

## (undated) DEVICE — ESU ELEC BLADE 2.75" COATED/INSULATED E1455

## (undated) DEVICE — SOL WATER IRRIG 1000ML BOTTLE 2F7114

## (undated) DEVICE — SU ETHILON 4-0 PS-2 18" 1667G

## (undated) DEVICE — PACK AV FISTULA CUSTOM SCV15AVFS1

## (undated) DEVICE — DECANTER BAG 2002S

## (undated) DEVICE — SU MONOCRYL 4-0 PS-2 18" UND Y496G

## (undated) DEVICE — DECANTER VIAL 2006S

## (undated) DEVICE — INTRO SHEATH 7FRX10CM PINNACLE RSS702

## (undated) DEVICE — KIT ENDO TURNOVER/PROCEDURE CARRY-ON 101822

## (undated) DEVICE — JELLY LUBRICATING SURGILUBE 4OZ TUBE

## (undated) DEVICE — DRSG DRAIN 4X4" 7086

## (undated) DEVICE — LINEN GOWN OVERSIZE 5408

## (undated) DEVICE — DRAIN JACKSON PRATT RESERVOIR 100ML SU130-1305

## (undated) DEVICE — SOL NACL 0.9% INJ 250ML BAG 2B1322Q

## (undated) DEVICE — CATH ANGIO WEDGE PRESSURE 5FRX110CM DL AI-07124

## (undated) DEVICE — BNDG KLING 2" 2231

## (undated) DEVICE — KIT DRAIN 6FR CATHETER PIGTAIL PERICARDIOCENTESIS PC101/A

## (undated) DEVICE — GOWN IMPERVIOUS 2XL BLUE

## (undated) DEVICE — DEFIB PRO-PADZ LVP LQD GEL ADULT 8900-2105-01

## (undated) DEVICE — SPONGE RAY-TEC 4X8" 7318

## (undated) DEVICE — Device

## (undated) DEVICE — SU PROLENE 6-0 C-1DA 30" 8706H

## (undated) DEVICE — VESSEL LOOP BLUE MINI 30-713

## (undated) DEVICE — KIT HAND CONTROL ANGIOTOUCH ACIST 65CM AT-P65

## (undated) DEVICE — TUBING SUCTION 12"X1/4" N612

## (undated) DEVICE — FASTENER CATH STAYFIX 685ME

## (undated) DEVICE — PACK MINOR SBA15MIFSE

## (undated) DEVICE — CATH ANGIO INFINITI MPA2 4FRX100CM 2 SH 538442

## (undated) DEVICE — PREP CHLORAPREP W/ORANGE TINT 10.5ML 930715

## (undated) DEVICE — CATH ANGIO INFINITI JR4 4FRX100CM 538421

## (undated) DEVICE — GLOVE BIOGEL PI ULTRATOUCH SZ 7.5 41175

## (undated) DEVICE — CATH ANGIO INFINITI JL4 4FRX100CM 538420

## (undated) DEVICE — BAG DECANTER STERILE WHITE DYNJDEC09

## (undated) DEVICE — VESSEL LOOPS YELLOW MINI 31145660

## (undated) DEVICE — KIT ENDO FIRST STEP DISINFECTANT 200ML W/POUCH EP-4

## (undated) DEVICE — SUCTION MANIFOLD NEPTUNE 2 SYS 1 PORT 702-025-000

## (undated) DEVICE — SU ETHILON 4-0 FS-2 18" 662H

## (undated) DEVICE — CATH ANGIO WEDGE PRESSURE 6FRX110CM DL AI-07126

## (undated) DEVICE — SU SILK 2-0 SH 30" K833H

## (undated) DEVICE — FASTENER CATH BALLOON CLAMPX2 STATLOCK 0684-00-493

## (undated) DEVICE — GLOVE BIOGEL PI MICRO SZ 7.5 48575

## (undated) DEVICE — SYR ORISE GEL 10ML M00519201

## (undated) DEVICE — INTRO SHEATH AVANTI 4FRX23CM 504604T

## (undated) DEVICE — RAD INTRODUCER KIT MICRO 5FRX10CM .018 NITINOL G/W

## (undated) DEVICE — TOTE ANGIO CORP PC15AT SAN32CC83O

## (undated) DEVICE — SU ETHILON 2-0 FS 18" 664H

## (undated) DEVICE — SU PROLENE 5-0 RB-1DA 36"  8556H

## (undated) DEVICE — GLOVE BIOGEL PI MICRO SZ 6.5 48565

## (undated) DEVICE — SU MONOCRYL 5-0 PS-2 18" UND Y495G

## (undated) DEVICE — SU VICRYL 3-0 SH 27" J316H

## (undated) DEVICE — ENDO SNARE LARIAT DESIGN ISNARE 2.5MM W/25GA NDL 00711084

## (undated) DEVICE — SU PROLENE 2-0 RB-1DA 36" 8559H

## (undated) DEVICE — DRAIN PENROSE 0.75"X18" LATEX FREE GR205

## (undated) DEVICE — GOWN IMPERVIOUS ZONED LG

## (undated) DEVICE — CATH ANGIO INFINITI JL5 4FRX100CM 538422

## (undated) DEVICE — SURGICEL HEMOSTAT 2X3" 1953

## (undated) DEVICE — WIRE GLIDE 0.035"X150CM VASC GR3506

## (undated) DEVICE — DRAIN CHANNEL ROUND 15FR FLUTED 072188

## (undated) DEVICE — SPECIMEN CONTAINER 3OZ W/FORMALIN 59901

## (undated) DEVICE — KIT HAND CONTROL ACIST 016795

## (undated) DEVICE — CATH ANGIO INFINITI 3DRC 4FRX100CM 538476

## (undated) DEVICE — WIRE GUIDE 0.035"X260CM SAFE-T-J EXCHANGE G00517

## (undated) DEVICE — TUBING PRESSURE 30"

## (undated) DEVICE — SU PROLENE 7-0 BV-1DA 24" 8702H

## (undated) DEVICE — VESSEL LOOPS RED MAXI

## (undated) DEVICE — PACK HEART LEFT CUSTOM

## (undated) DEVICE — TUBING SUCTION MEDI-VAC SOFT 3/16"X20' N520A

## (undated) RX ORDER — CEFAZOLIN SODIUM/WATER 2 G/20 ML
SYRINGE (ML) INTRAVENOUS
Status: DISPENSED
Start: 2025-06-24

## (undated) RX ORDER — FENTANYL CITRATE 50 UG/ML
INJECTION, SOLUTION INTRAMUSCULAR; INTRAVENOUS
Status: DISPENSED
Start: 2022-11-29

## (undated) RX ORDER — HYDROMORPHONE HCL IN WATER/PF 6 MG/30 ML
PATIENT CONTROLLED ANALGESIA SYRINGE INTRAVENOUS
Status: DISPENSED
Start: 2024-01-03

## (undated) RX ORDER — HEPARIN SODIUM 1000 [USP'U]/ML
INJECTION, SOLUTION INTRAVENOUS; SUBCUTANEOUS
Status: DISPENSED
Start: 2023-03-21

## (undated) RX ORDER — FENTANYL CITRATE 50 UG/ML
INJECTION, SOLUTION INTRAMUSCULAR; INTRAVENOUS
Status: DISPENSED
Start: 2022-12-01

## (undated) RX ORDER — HEPARIN SODIUM 200 [USP'U]/100ML
INJECTION, SOLUTION INTRAVENOUS
Status: DISPENSED
Start: 2022-12-01

## (undated) RX ORDER — HEPARIN SODIUM 1000 [USP'U]/ML
INJECTION, SOLUTION INTRAVENOUS; SUBCUTANEOUS
Status: DISPENSED
Start: 2023-09-29

## (undated) RX ORDER — HEPARIN SODIUM 200 [USP'U]/100ML
INJECTION, SOLUTION INTRAVENOUS
Status: DISPENSED
Start: 2025-06-23

## (undated) RX ORDER — FENTANYL CITRATE 50 UG/ML
INJECTION, SOLUTION INTRAMUSCULAR; INTRAVENOUS
Status: DISPENSED
Start: 2025-07-03

## (undated) RX ORDER — FENTANYL CITRATE 50 UG/ML
INJECTION, SOLUTION INTRAMUSCULAR; INTRAVENOUS
Status: DISPENSED
Start: 2024-02-22

## (undated) RX ORDER — SODIUM CHLORIDE 9 MG/ML
INJECTION, SOLUTION INTRAVENOUS
Status: DISPENSED
Start: 2024-04-09

## (undated) RX ORDER — FENTANYL CITRATE 50 UG/ML
INJECTION, SOLUTION INTRAMUSCULAR; INTRAVENOUS
Status: DISPENSED
Start: 2024-04-09

## (undated) RX ORDER — LIDOCAINE HYDROCHLORIDE 10 MG/ML
INJECTION, SOLUTION EPIDURAL; INFILTRATION; INTRACAUDAL; PERINEURAL
Status: DISPENSED
Start: 2023-09-29

## (undated) RX ORDER — SODIUM CHLORIDE 9 MG/ML
INJECTION, SOLUTION INTRAVENOUS
Status: DISPENSED
Start: 2024-02-22

## (undated) RX ORDER — FENTANYL CITRATE 50 UG/ML
INJECTION, SOLUTION INTRAMUSCULAR; INTRAVENOUS
Status: DISPENSED
Start: 2024-01-03

## (undated) RX ORDER — FENTANYL CITRATE-0.9 % NACL/PF 10 MCG/ML
PLASTIC BAG, INJECTION (ML) INTRAVENOUS
Status: DISPENSED
Start: 2024-02-22

## (undated) RX ORDER — FENTANYL CITRATE 50 UG/ML
INJECTION, SOLUTION INTRAMUSCULAR; INTRAVENOUS
Status: DISPENSED
Start: 2025-06-24

## (undated) RX ORDER — PROPOFOL 10 MG/ML
INJECTION, EMULSION INTRAVENOUS
Status: DISPENSED
Start: 2025-06-24

## (undated) RX ORDER — LIDOCAINE HYDROCHLORIDE 10 MG/ML
INJECTION, SOLUTION EPIDURAL; INFILTRATION; INTRACAUDAL; PERINEURAL
Status: DISPENSED
Start: 2024-11-12

## (undated) RX ORDER — FENTANYL CITRATE 50 UG/ML
INJECTION, SOLUTION INTRAMUSCULAR; INTRAVENOUS
Status: DISPENSED
Start: 2023-10-11

## (undated) RX ORDER — FENTANYL CITRATE 50 UG/ML
INJECTION, SOLUTION INTRAMUSCULAR; INTRAVENOUS
Status: DISPENSED
Start: 2023-08-15

## (undated) RX ORDER — HEPARIN SODIUM 1000 [USP'U]/ML
INJECTION, SOLUTION INTRAVENOUS; SUBCUTANEOUS
Status: DISPENSED
Start: 2025-06-23

## (undated) RX ORDER — ALBUMIN (HUMAN) 12.5 G/50ML
SOLUTION INTRAVENOUS
Status: DISPENSED
Start: 2024-11-12

## (undated) RX ORDER — PROPOFOL 10 MG/ML
INJECTION, EMULSION INTRAVENOUS
Status: DISPENSED
Start: 2023-03-21

## (undated) RX ORDER — BUPIVACAINE HYDROCHLORIDE 5 MG/ML
INJECTION, SOLUTION EPIDURAL; INTRACAUDAL
Status: DISPENSED
Start: 2024-01-03

## (undated) RX ORDER — HEPARIN SODIUM 200 [USP'U]/100ML
INJECTION, SOLUTION INTRAVENOUS
Status: DISPENSED
Start: 2024-04-09

## (undated) RX ORDER — LIDOCAINE HYDROCHLORIDE 10 MG/ML
INJECTION, SOLUTION INFILTRATION; PERINEURAL
Status: DISPENSED
Start: 2024-01-03

## (undated) RX ORDER — FENTANYL CITRATE 50 UG/ML
INJECTION, SOLUTION INTRAMUSCULAR; INTRAVENOUS
Status: DISPENSED
Start: 2022-12-06

## (undated) RX ORDER — LIDOCAINE HYDROCHLORIDE 10 MG/ML
INJECTION, SOLUTION INFILTRATION; PERINEURAL
Status: DISPENSED
Start: 2025-06-23

## (undated) RX ORDER — EPHEDRINE SULFATE 50 MG/ML
INJECTION, SOLUTION INTRAMUSCULAR; INTRAVENOUS; SUBCUTANEOUS
Status: DISPENSED
Start: 2024-01-03

## (undated) RX ORDER — FENTANYL CITRATE 50 UG/ML
INJECTION, SOLUTION INTRAMUSCULAR; INTRAVENOUS
Status: DISPENSED
Start: 2023-03-21

## (undated) RX ORDER — LIDOCAINE HYDROCHLORIDE 10 MG/ML
INJECTION, SOLUTION EPIDURAL; INFILTRATION; INTRACAUDAL; PERINEURAL
Status: DISPENSED
Start: 2024-04-09

## (undated) RX ORDER — HEPARIN SODIUM 1000 [USP'U]/ML
INJECTION, SOLUTION INTRAVENOUS; SUBCUTANEOUS
Status: DISPENSED
Start: 2022-12-06

## (undated) RX ORDER — HEPARIN SODIUM 1000 [USP'U]/ML
INJECTION, SOLUTION INTRAVENOUS; SUBCUTANEOUS
Status: DISPENSED
Start: 2024-02-22

## (undated) RX ORDER — HEPARIN SODIUM 200 [USP'U]/100ML
INJECTION, SOLUTION INTRAVENOUS
Status: DISPENSED
Start: 2024-02-22

## (undated) RX ORDER — SODIUM CHLORIDE 9 MG/ML
INJECTION, SOLUTION INTRAVENOUS
Status: DISPENSED
Start: 2017-05-31

## (undated) RX ORDER — GINSENG 100 MG
CAPSULE ORAL
Status: DISPENSED
Start: 2024-01-03

## (undated) RX ORDER — FENTANYL CITRATE 50 UG/ML
INJECTION, SOLUTION INTRAMUSCULAR; INTRAVENOUS
Status: DISPENSED
Start: 2023-07-13

## (undated) RX ORDER — CEFAZOLIN SODIUM/WATER 2 G/20 ML
SYRINGE (ML) INTRAVENOUS
Status: DISPENSED
Start: 2023-08-15

## (undated) RX ORDER — ONDANSETRON 2 MG/ML
INJECTION INTRAMUSCULAR; INTRAVENOUS
Status: DISPENSED
Start: 2025-06-24

## (undated) RX ORDER — LIDOCAINE HYDROCHLORIDE 10 MG/ML
INJECTION, SOLUTION INFILTRATION; PERINEURAL
Status: DISPENSED
Start: 2022-12-20

## (undated) RX ORDER — ASPIRIN 325 MG
TABLET ORAL
Status: DISPENSED
Start: 2024-02-22

## (undated) RX ORDER — DEXAMETHASONE SODIUM PHOSPHATE 4 MG/ML
INJECTION, SOLUTION INTRA-ARTICULAR; INTRALESIONAL; INTRAMUSCULAR; INTRAVENOUS; SOFT TISSUE
Status: DISPENSED
Start: 2023-08-15

## (undated) RX ORDER — HEPARIN SODIUM 1000 [USP'U]/ML
INJECTION, SOLUTION INTRAVENOUS; SUBCUTANEOUS
Status: DISPENSED
Start: 2023-08-15

## (undated) RX ORDER — FENTANYL CITRATE 50 UG/ML
INJECTION, SOLUTION INTRAMUSCULAR; INTRAVENOUS
Status: DISPENSED
Start: 2017-05-31

## (undated) RX ORDER — FENTANYL CITRATE 50 UG/ML
INJECTION, SOLUTION INTRAMUSCULAR; INTRAVENOUS
Status: DISPENSED
Start: 2023-09-29

## (undated) RX ORDER — CEFAZOLIN SODIUM 2 G/100ML
INJECTION, SOLUTION INTRAVENOUS
Status: DISPENSED
Start: 2024-01-03

## (undated) RX ORDER — LIDOCAINE HYDROCHLORIDE 10 MG/ML
INJECTION, SOLUTION EPIDURAL; INFILTRATION; INTRACAUDAL; PERINEURAL
Status: DISPENSED
Start: 2025-03-20

## (undated) RX ORDER — EPHEDRINE SULFATE 50 MG/ML
INJECTION, SOLUTION INTRAMUSCULAR; INTRAVENOUS; SUBCUTANEOUS
Status: DISPENSED
Start: 2023-08-15

## (undated) RX ORDER — LIDOCAINE HYDROCHLORIDE 10 MG/ML
INJECTION, SOLUTION INFILTRATION; PERINEURAL
Status: DISPENSED
Start: 2023-07-13

## (undated) RX ORDER — FENTANYL CITRATE 0.05 MG/ML
INJECTION, SOLUTION INTRAMUSCULAR; INTRAVENOUS
Status: DISPENSED
Start: 2023-08-15

## (undated) RX ORDER — NICARDIPINE HCL-0.9% SOD CHLOR 1 MG/10 ML
SYRINGE (ML) INTRAVENOUS
Status: DISPENSED
Start: 2024-02-22

## (undated) RX ORDER — NITROGLYCERIN 5 MG/ML
VIAL (ML) INTRAVENOUS
Status: DISPENSED
Start: 2024-02-22

## (undated) RX ORDER — PROPOFOL 10 MG/ML
INJECTION, EMULSION INTRAVENOUS
Status: DISPENSED
Start: 2022-11-29

## (undated) RX ORDER — ONDANSETRON 2 MG/ML
INJECTION INTRAMUSCULAR; INTRAVENOUS
Status: DISPENSED
Start: 2023-08-15

## (undated) RX ORDER — HEPARIN SODIUM 1000 [USP'U]/ML
INJECTION, SOLUTION INTRAVENOUS; SUBCUTANEOUS
Status: DISPENSED
Start: 2024-01-03

## (undated) RX ORDER — CEFAZOLIN SODIUM 2 G/100ML
INJECTION, SOLUTION INTRAVENOUS
Status: DISPENSED
Start: 2022-12-06

## (undated) RX ORDER — HEPARIN SODIUM 200 [USP'U]/100ML
INJECTION, SOLUTION INTRAVENOUS
Status: DISPENSED
Start: 2023-07-13